# Patient Record
Sex: MALE | Race: WHITE | NOT HISPANIC OR LATINO | Employment: OTHER | URBAN - NONMETROPOLITAN AREA
[De-identification: names, ages, dates, MRNs, and addresses within clinical notes are randomized per-mention and may not be internally consistent; named-entity substitution may affect disease eponyms.]

---

## 2017-01-30 ENCOUNTER — TRANSCRIBE ORDERS (OUTPATIENT)
Dept: ADMINISTRATIVE | Facility: HOSPITAL | Age: 62
End: 2017-01-30

## 2017-01-30 ENCOUNTER — HOSPITAL ENCOUNTER (OUTPATIENT)
Dept: GENERAL RADIOLOGY | Facility: HOSPITAL | Age: 62
Discharge: HOME | End: 2017-01-30
Admitting: NURSE PRACTITIONER

## 2017-01-30 DIAGNOSIS — R05.9 COUGH: Primary | ICD-10-CM

## 2017-01-31 ENCOUNTER — ANESTHESIA EVENT (OUTPATIENT)
Dept: GASTROENTEROLOGY | Facility: HOSPITAL | Age: 62
End: 2017-01-31

## 2017-01-31 ENCOUNTER — ANESTHESIA (OUTPATIENT)
Dept: GASTROENTEROLOGY | Facility: HOSPITAL | Age: 62
End: 2017-01-31

## 2017-01-31 PROCEDURE — 25010000002 PROPOFOL 10 MG/ML EMULSION: Performed by: NURSE ANESTHETIST, CERTIFIED REGISTERED

## 2017-01-31 RX ORDER — LIDOCAINE HYDROCHLORIDE 20 MG/ML
INJECTION, SOLUTION INFILTRATION; PERINEURAL AS NEEDED
Status: DISCONTINUED | OUTPATIENT
Start: 2017-01-31 | End: 2017-01-31 | Stop reason: SURG

## 2017-01-31 RX ORDER — PROPOFOL 10 MG/ML
VIAL (ML) INTRAVENOUS AS NEEDED
Status: DISCONTINUED | OUTPATIENT
Start: 2017-01-31 | End: 2017-01-31 | Stop reason: SURG

## 2017-01-31 RX ADMIN — LIDOCAINE HYDROCHLORIDE 50 MG: 20 INJECTION, SOLUTION INFILTRATION; PERINEURAL at 13:04

## 2017-01-31 RX ADMIN — PROPOFOL 450 MG: 10 INJECTION, EMULSION INTRAVENOUS at 13:04

## 2017-01-31 NOTE — ANESTHESIA POSTPROCEDURE EVALUATION
Patient: Erick Luong    Procedure Summary     Date Anesthesia Start Anesthesia Stop Room / Location    01/31/17 1301 1323  [facility] /  [facility]       Procedure Diagnosis Surgeon Provider    COLONOSCOPY WITH ANESTHESIA (N/A ) Abdominal pain, unspecified location  (Abdominal pain, unspecified location [R10.9]) MD Lamine Davila, YAIR          Anesthesia Type: general  Last vitals  BP      Temp      Pulse     Resp      SpO2        Post Anesthesia Care and Evaluation    Patient location during evaluation: PHASE II  Patient participation: complete - patient participated  Level of consciousness: awake  Pain management: adequate  Airway patency: patent  Anesthetic complications: No anesthetic complications  Respiratory status: acceptable  Hydration status: acceptable

## 2017-01-31 NOTE — ANESTHESIA PREPROCEDURE EVALUATION
Anesthesia Evaluation     Patient summary reviewed    No history of anesthetic complications   Airway   Mallampati: III  TM distance: >3 FB  Neck ROM: limited  Dental      Pulmonary    (+) sleep apnea,   (-) COPD, asthma, not a smoker  Cardiovascular   (+) hypertension, CAD, CABG (10/2015),   (-) pacemaker, angina, cardiac stents    Patient on routine beta blocker and Beta blocker given within 24 hours of surgery    Neuro/Psych  (-) seizures, TIA, CVA  GI/Hepatic/Renal/Endo    (+) morbid obesity, chronic renal disease CRI, diabetes mellitus using insulin,   (-) GERD, liver disease    Musculoskeletal     Abdominal    Substance History      OB/GYN          Other                             Anesthesia Plan    ASA 3     general     intravenous induction   Anesthetic plan and risks discussed with patient.

## 2017-02-01 ENCOUNTER — HOSPITAL ENCOUNTER (EMERGENCY)
Facility: HOSPITAL | Age: 62
Discharge: HOME | End: 2017-02-01
Attending: EMERGENCY MEDICINE | Admitting: EMERGENCY MEDICINE

## 2017-02-01 ENCOUNTER — APPOINTMENT (OUTPATIENT)
Dept: GENERAL RADIOLOGY | Facility: HOSPITAL | Age: 62
End: 2017-02-01

## 2017-02-01 VITALS
WEIGHT: 315 LBS | TEMPERATURE: 98.3 F | DIASTOLIC BLOOD PRESSURE: 61 MMHG | HEIGHT: 72 IN | BODY MASS INDEX: 42.66 KG/M2 | RESPIRATION RATE: 19 BRPM | HEART RATE: 73 BPM | SYSTOLIC BLOOD PRESSURE: 136 MMHG | OXYGEN SATURATION: 97 %

## 2017-02-01 DIAGNOSIS — Z77.098 EXPOSURE TO CHEMICAL INHALATION: Primary | ICD-10-CM

## 2017-02-01 PROCEDURE — 99284 EMERGENCY DEPT VISIT MOD MDM: CPT

## 2017-02-01 PROCEDURE — 71010 HC CHEST PA OR AP: CPT

## 2017-02-01 PROCEDURE — 96374 THER/PROPH/DIAG INJ IV PUSH: CPT

## 2017-02-01 PROCEDURE — 93005 ELECTROCARDIOGRAM TRACING: CPT | Performed by: EMERGENCY MEDICINE

## 2017-02-01 PROCEDURE — 25010000002 METHYLPREDNISOLONE PER 125 MG: Performed by: EMERGENCY MEDICINE

## 2017-02-01 PROCEDURE — 94640 AIRWAY INHALATION TREATMENT: CPT

## 2017-02-01 PROCEDURE — 93010 ELECTROCARDIOGRAM REPORT: CPT | Performed by: INTERNAL MEDICINE

## 2017-02-01 RX ORDER — SODIUM CHLORIDE 0.9 % (FLUSH) 0.9 %
10 SYRINGE (ML) INJECTION AS NEEDED
Status: DISCONTINUED | OUTPATIENT
Start: 2017-02-01 | End: 2017-02-01 | Stop reason: HOSPADM

## 2017-02-01 RX ORDER — IPRATROPIUM BROMIDE AND ALBUTEROL SULFATE 2.5; .5 MG/3ML; MG/3ML
3 SOLUTION RESPIRATORY (INHALATION) ONCE
Status: COMPLETED | OUTPATIENT
Start: 2017-02-01 | End: 2017-02-01

## 2017-02-01 RX ORDER — METHYLPREDNISOLONE 4 MG/1
TABLET ORAL
Qty: 21 TABLET | Refills: 0 | Status: SHIPPED | OUTPATIENT
Start: 2017-02-01 | End: 2017-03-30 | Stop reason: HOSPADM

## 2017-02-01 RX ORDER — METHYLPREDNISOLONE SODIUM SUCCINATE 125 MG/2ML
125 INJECTION, POWDER, LYOPHILIZED, FOR SOLUTION INTRAMUSCULAR; INTRAVENOUS ONCE
Status: COMPLETED | OUTPATIENT
Start: 2017-02-01 | End: 2017-02-01

## 2017-02-01 RX ADMIN — METHYLPREDNISOLONE SODIUM SUCCINATE 125 MG: 125 INJECTION, POWDER, FOR SOLUTION INTRAMUSCULAR; INTRAVENOUS at 14:33

## 2017-02-01 RX ADMIN — IPRATROPIUM BROMIDE AND ALBUTEROL SULFATE 3 ML: .5; 3 SOLUTION RESPIRATORY (INHALATION) at 14:01

## 2017-02-02 ENCOUNTER — TELEPHONE (OUTPATIENT)
Dept: GASTROENTEROLOGY | Facility: CLINIC | Age: 62
End: 2017-02-02

## 2017-02-02 NOTE — ED PROVIDER NOTES
"Subjective   HPI Comments: Patient used bleach and \"liquid fire\" to clean toilet bowl and got some of fumes in lungs.  Since then having cough and SOB.    Patient is a 61 y.o. male presenting with shortness of breath.   History provided by:  Patient   used: No    Shortness of Breath   Severity:  Severe  Onset quality:  Sudden  Duration:  4 hours  Timing:  Constant  Progression:  Unchanged  Chronicity:  New  Context: fumes    Context: not activity, not animal exposure and not emotional upset    Relieved by:  Nothing  Worsened by:  Nothing  Ineffective treatments:  None tried  Associated symptoms: cough    Risk factors: no recent alcohol use, no family hx of DVT and no hx of cancer        Review of Systems   Constitutional: Negative.    HENT: Negative.    Respiratory: Positive for cough and shortness of breath.    Cardiovascular: Negative.    Gastrointestinal: Negative.    Genitourinary: Negative.    Musculoskeletal: Negative.    Neurological: Negative.    All other systems reviewed and are negative.      Past Medical History   Diagnosis Date   • Arthritis    • CHF (congestive heart failure)    • Coronary artery disease    • Diabetes mellitus    • Hypertension    • Sleep apnea with use of continuous positive airway pressure (CPAP)        Allergies   Allergen Reactions   • Bactrim [Sulfamethoxazole-Trimethoprim]        Past Surgical History   Procedure Laterality Date   • Appendectomy     • Abdominal surgery     • Cataract extraction     • Cardiac surgery     • Incision and drainage of wound Left 09/2007     spider bite   • Colonoscopy w/ polypectomy  03/04/2014     Hyperplastic polyp   • Endoscopy  04/13/2011     Gastritis with hemorrhage   • Cervical spine surgery     • Colonoscopy N/A 1/31/2017     Procedure: COLONOSCOPY WITH ANESTHESIA;  Surgeon: Cristopher Hannah MD;  Location: [facility] ENDOSCOPY;  Service:        Family History   Problem Relation Age of Onset   • Colon cancer Father    • Colon " cancer Sister        Social History     Social History   • Marital status:      Spouse name: N/A   • Number of children: N/A   • Years of education: N/A     Social History Main Topics   • Smoking status: Former Smoker   • Smokeless tobacco: None      Comment: smoked in highschool   • Alcohol use No   • Drug use: No   • Sexual activity: Defer     Other Topics Concern   • None     Social History Narrative   • None       Prior to Admission medications    Medication Sig Start Date End Date Taking? Authorizing Provider   allopurinol (ZYLOPRIM) 100 MG tablet Take 100 mg by mouth Daily.    Historical Provider, MD   aspirin 81 MG EC tablet Take 81 mg by mouth Daily.    Historical Provider, MD   bumetanide (BUMEX) 2 MG tablet TAKE 1 TABLET EVERY DAY 9/11/16   Historical Provider, MD   BYSTOLIC 5 MG tablet TAKE 1 TABLET BY MOUTH DAILY 9/12/16   Historical Provider, MD   CRESTOR 40 MG tablet TAKE 1 TABLET EVERY DAY 8/28/16   Historical Provider, MD   escitalopram (LEXAPRO) 10 MG tablet TAKE 1 TABLET DAILY 8/28/16   Historical Provider, MD   insulin regular (humuLIN R) 500 UNIT/ML CONCENTRATED injection Inject 100 Units under the skin 3 (Three) Times a Day Before Meals. Packaging states 100 units with regular meals; 120 units with large meals or 360 units daily    Historical Provider, MD   irbesartan (AVAPRO) 300 MG tablet TAKE 1 TABLET EVERY DAY 9/11/16   Historical Provider, MD   LINZESS 290 MCG capsule capsule TAKE 1 CAPSULE EVERY DAY 7/11/16   Historical Provider, MD   MethylPREDNISolone (MEDROL, JOSE,) 4 MG tablet Take as directed on package instructions. 2/1/17   Karel Arriaza Jr., MD   metOLazone (ZAROXOLYN) 2.5 MG tablet Take 2.5 mg by mouth Daily.    Historical Provider, MD   ondansetron (ZOFRAN) 4 MG tablet TAKE 1 TABLET BY MOUTH EVERY 4 HOURS AS NEEDED FOR NAUSEA AND VOMITING 9/5/16   Historical Provider, MD   oxyCODONE-acetaminophen (PERCOCET)  MG per tablet TAKE 1 TABLET BY MOUTH TWICE A DAY AS  NEEDED 8/16/16   Historical Provider, MD   traZODone (DESYREL) 50 MG tablet TAKE 1 TABLET BY MOUTH AT BEDTIME 8/29/16   Historical Provider, MD       Medications   sodium chloride 0.9 % flush 10 mL (not administered)   methylPREDNISolone sodium succinate (SOLU-Medrol) injection 125 mg (125 mg Intravenous Given 2/1/17 1433)   ipratropium-albuterol (DUO-NEB) nebulizer solution 3 mL (3 mL Nebulization Given 2/1/17 1401)       Vitals:    02/01/17 1649   BP: 136/61   Pulse: 73   Resp: 19   Temp: 98.3 °F (36.8 °C)   SpO2: 97%         Objective   Physical Exam   Constitutional: He is oriented to person, place, and time. He appears well-developed and well-nourished.   HENT:   Head: Normocephalic and atraumatic.   Mouth/Throat: Oropharynx is clear and moist.   Neck: Normal range of motion. Neck supple.   Cardiovascular: Normal rate and regular rhythm.    Pulmonary/Chest:   Mild tachypnea with scattered wheezes.   Abdominal: Soft. Bowel sounds are normal.   Musculoskeletal: Normal range of motion.   Neurological: He is alert and oriented to person, place, and time.   Skin: Skin is warm and dry.   Psychiatric: He has a normal mood and affect. His behavior is normal.   Nursing note and vitals reviewed.      Procedures         Lab Results (last 24 hours)     ** No results found for the last 24 hours. **          XR Chest 1 View   Final Result   Impression: No acute findings. Stable exam.   This report was finalized on 02/01/2017 14:42 by Dr. Justen Figueroa MD.          ED Course  ED Course   Comment By Time   After treatment per recommendations of poison control for earlier similar patient, this patient feeling much better. Karel Arriaza Jr., MD 02/02 0504          MDM  Number of Diagnoses or Management Options  Exposure to chemical inhalation: new and requires workup     Amount and/or Complexity of Data Reviewed  Tests in the radiology section of CPT®: ordered and reviewed    Risk of Complications, Morbidity, and/or  Mortality  Presenting problems: moderate  Diagnostic procedures: moderate  Management options: moderate    Patient Progress  Patient progress: stable      Final diagnoses:   Exposure to chemical inhalation        Karel Arriaza Jr., MD  02/02/17 4049

## 2017-02-06 PROBLEM — I50.9 CHF (CONGESTIVE HEART FAILURE) (HCC): Status: ACTIVE | Noted: 2017-02-06

## 2017-02-06 PROBLEM — E11.9 DIABETES MELLITUS: Status: ACTIVE | Noted: 2017-02-06

## 2017-02-06 PROBLEM — I10 HYPERTENSION: Status: ACTIVE | Noted: 2017-02-06

## 2017-02-06 PROBLEM — M19.90 ARTHRITIS: Status: ACTIVE | Noted: 2017-02-06

## 2017-02-06 PROBLEM — G47.30 SLEEP APNEA: Status: ACTIVE | Noted: 2017-02-06

## 2017-02-06 PROBLEM — I25.10 CAD (CORONARY ARTERY DISEASE): Status: ACTIVE | Noted: 2017-02-06

## 2017-02-07 ENCOUNTER — OFFICE VISIT (OUTPATIENT)
Dept: CARDIOLOGY | Facility: CLINIC | Age: 62
End: 2017-02-07

## 2017-02-07 VITALS
DIASTOLIC BLOOD PRESSURE: 64 MMHG | HEART RATE: 69 BPM | HEIGHT: 72 IN | BODY MASS INDEX: 42.66 KG/M2 | SYSTOLIC BLOOD PRESSURE: 124 MMHG | WEIGHT: 315 LBS

## 2017-02-07 DIAGNOSIS — I25.10 CORONARY ARTERY DISEASE INVOLVING NATIVE CORONARY ARTERY OF NATIVE HEART WITHOUT ANGINA PECTORIS: Primary | ICD-10-CM

## 2017-02-07 DIAGNOSIS — E78.2 MIXED HYPERLIPIDEMIA: ICD-10-CM

## 2017-02-07 DIAGNOSIS — R06.02 SHORTNESS OF BREATH: ICD-10-CM

## 2017-02-07 DIAGNOSIS — I10 ESSENTIAL HYPERTENSION: ICD-10-CM

## 2017-02-07 PROCEDURE — 99213 OFFICE O/P EST LOW 20 MIN: CPT | Performed by: INTERNAL MEDICINE

## 2017-02-07 PROCEDURE — 93000 ELECTROCARDIOGRAM COMPLETE: CPT | Performed by: INTERNAL MEDICINE

## 2017-02-07 NOTE — PROGRESS NOTES
Subjective:     Encounter Date:02/07/2017      Patient ID: Erick Luong is a 61 y.o. male.  With coronary artery disease status post coronary artery bypass grafting, hypertension, hyperlipidemia, chronic shortness of breath, intermittent lower extremity edema, type II diabetes mellitus, morbid obesity here for routine follow-up    Chief Complaint: Routine follow-up    Coronary Artery Disease   Presents for follow-up visit. Symptoms include leg swelling and shortness of breath. Pertinent negatives include no chest pain, chest pressure, chest tightness, dizziness, muscle weakness, palpitations or weight gain. The symptoms have been stable. Compliance with diet is variable. Compliance with exercise is variable. Compliance with medications is good.   Hypertension   This is a chronic problem. The problem is unchanged. The problem is controlled. Associated symptoms include malaise/fatigue, peripheral edema and shortness of breath. Pertinent negatives include no chest pain, headaches, orthopnea, palpitations or PND. There are no associated agents to hypertension. Risk factors for coronary artery disease include obesity, male gender, dyslipidemia and diabetes mellitus. Past treatments include diuretics, angiotensin blockers and beta blockers. The current treatment provides significant improvement. Compliance problems include exercise and diet.  Hypertensive end-organ damage includes CAD/MI. There is no history of angina.   Shortness of Breath   This is a chronic problem. The problem occurs daily. The problem has been unchanged. Associated symptoms include leg swelling. Pertinent negatives include no abdominal pain, chest pain, claudication, fever, headaches, hemoptysis, orthopnea, PND, sore throat, syncope, vomiting or wheezing. The symptoms are aggravated by exercise. He has tried rest for the symptoms. The treatment provided moderate relief. His past medical history is significant for CAD. There is no history of PE.      The patient notes that he has been doing reasonably well from a cardiac standpoint.  He has not been having any significant chest pain.  He continues to have mild shortness of breath and dyspnea on exertion.  He has intermittent lower extremity edema but this is managed fairly well with diuretics.  His weight has been relatively stable other than some occasional up 7 pounds.  Recently, he is not to the emergency department after inhaling chemical fumes.  Apparently this was unintentional.  Since that time, he has had no significant difficulties.  He has had no trouble with his medications.  He denies orthopnea, PND.    The following portions of the patient's history were reviewed and updated as appropriate: allergies, current medications, past family history, past medical history, past social history, past surgical history and problem list.       Current Outpatient Prescriptions:   •  allopurinol (ZYLOPRIM) 100 MG tablet, Take 100 mg by mouth Daily., Disp: , Rfl:   •  aspirin 81 MG EC tablet, Take 81 mg by mouth Daily., Disp: , Rfl:   •  bumetanide (BUMEX) 2 MG tablet, TAKE 1 TABLET EVERY DAY, Disp: , Rfl: 4  •  BYSTOLIC 5 MG tablet, TAKE 1 TABLET BY MOUTH DAILY, Disp: , Rfl: 3  •  CRESTOR 40 MG tablet, TAKE 1 TABLET EVERY DAY, Disp: , Rfl: 6  •  escitalopram (LEXAPRO) 10 MG tablet, TAKE 1 TABLET DAILY, Disp: , Rfl: 5  •  insulin regular (humuLIN R) 500 UNIT/ML CONCENTRATED injection, Inject 100 Units under the skin 3 (Three) Times a Day Before Meals. Packaging states 100 units with regular meals; 120 units with large meals or 360 units daily, Disp: , Rfl:   •  irbesartan (AVAPRO) 300 MG tablet, TAKE 1 TABLET EVERY DAY, Disp: , Rfl: 11  •  LINZESS 290 MCG capsule capsule, TAKE 1 CAPSULE EVERY DAY, Disp: , Rfl: 3  •  MethylPREDNISolone (MEDROL, JOSE,) 4 MG tablet, Take as directed on package instructions., Disp: 21 tablet, Rfl: 0  •  metOLazone (ZAROXOLYN) 2.5 MG tablet, Take 2.5 mg by mouth Daily., Disp: , Rfl:    •  ondansetron (ZOFRAN) 4 MG tablet, TAKE 1 TABLET BY MOUTH EVERY 4 HOURS AS NEEDED FOR NAUSEA AND VOMITING, Disp: , Rfl: 0  •  oxyCODONE-acetaminophen (PERCOCET)  MG per tablet, TAKE 1 TABLET BY MOUTH TWICE A DAY AS NEEDED, Disp: , Rfl: 0  •  traZODone (DESYREL) 50 MG tablet, TAKE 1 TABLET BY MOUTH AT BEDTIME, Disp: , Rfl: 3    Review of Systems   Constitution: Positive for malaise/fatigue. Negative for chills, fever, weakness, night sweats, weight gain and weight loss.   HENT: Negative for congestion, headaches and sore throat.    Cardiovascular: Positive for dyspnea on exertion and leg swelling. Negative for chest pain, claudication, irregular heartbeat, orthopnea, palpitations, paroxysmal nocturnal dyspnea and syncope.   Respiratory: Positive for shortness of breath. Negative for chest tightness, cough, hemoptysis and wheezing.    Endocrine: Negative for cold intolerance, heat intolerance, polydipsia and polyuria.   Hematologic/Lymphatic: Negative for bleeding problem. Does not bruise/bleed easily.   Musculoskeletal: Negative for muscle weakness.   Gastrointestinal: Negative for abdominal pain, hematemesis, hematochezia, melena, nausea and vomiting.   Genitourinary: Negative for bladder incontinence, dysuria and hematuria.   Neurological: Negative for dizziness, loss of balance, numbness, paresthesias and seizures.       ECG 12 Lead  Date/Time: 2/7/2017 5:10 PM  Performed by: RIVERA GANT  Authorized by: RIVERA GANT   Comparison: compared with previous ECG   Similar to previous ECG  Rhythm: sinus rhythm  Rate: normal  Conduction: incomplete RBBB, LAFB and 1st degree  Clinical impression: abnormal ECG             Objective:     Physical Exam   Constitutional: He is oriented to person, place, and time. He appears well-developed and well-nourished. No distress.   HENT:   Head: Normocephalic and atraumatic.   Mouth/Throat: Oropharynx is clear and moist.   Eyes: EOM are normal. Pupils are  "equal, round, and reactive to light.   Neck: Normal range of motion. Neck supple. No JVD present. No thyromegaly present.   Cardiovascular: Normal rate, regular rhythm, S1 normal, S2 normal, normal heart sounds and intact distal pulses.  Exam reveals no gallop and no friction rub.    No murmur heard.  Pulmonary/Chest: Effort normal and breath sounds normal.   Abdominal: Soft. Bowel sounds are normal. He exhibits no distension. There is no tenderness.   Musculoskeletal: Normal range of motion. He exhibits no edema.   Neurological: He is alert and oriented to person, place, and time. No cranial nerve deficit.   Skin: Skin is warm and dry. No rash noted. No cyanosis or erythema. Nails show no clubbing.   Psychiatric: He has a normal mood and affect.   Vitals reviewed.    Visit Vitals   • /64 (BP Location: Left arm, Patient Position: Sitting)   • Pulse 69   • Ht 72\" (182.9 cm)   • Wt (!) 344 lb (156 kg)   • BMI 46.65 kg/m2     Data Review:    Cardiac cath 10/1/2015  SELECTIVE CORONARY ANGIOGRAPHY:     LEFT MAIN CORONARY ARTERY: The left main coronary artery arises normally from  the left coronary cusp and bifurcates into an LAD and left circumflex arteries  and has luminal irregularities, but no significant disease.     LEFT ANTERIOR DESCENDING: The left anterior descending artery arises from the  distal left main and courses in the anterior interventricular groove supplying  1 significant diagonal branch, multiple septal perforators and terminates by  wrapping the apex. The proximal portion of the LAD is calcified and in the  proximal and mid portions has tandem 80% stenotic lesions. The first diagonal  branch has an ostial 70% stenosis. The remainder of the vessel has diffuse  nonobstructive disease.      LEFT CIRCUMFLEX: The left circumflex arises from the distal left main and  supplies 2 obtuse marginal branches which have luminal irregularities,  predominantly of 20-30%. The ostium of the second obtuse " marginal appears in  some views to have a 50-60% stenotic lesion.     RIGHT CORONARY ARTERY: The right coronary artery arises normally from the   right coronary cusp and is dominant for the posterior circulation supplying a   posterior descending artery and a posterolateral system. The proximal portion   of the right coronary artery has an 80% stenosis. The remainder of the vessel   is diffusely diseased with mid vessel having approximately 40% stenosis. The  posterolateral branch has no significant disease. The posterior descending  artery has 30-40% proximal stenosis.    CABG 10/2/2015:  SURGICAL PROCEDURE: Coronary artery bypass grafting x4 (left internal mammary  artery graft to left anterior descending coronary artery; saphenous vein graft  to diagonal coronary artery; saphenous vein graft to obtuse marginal coronary  artery; saphenous vein graft to right coronary artery) and endoscopic vein  harvesting.       Assessment:          Diagnosis Plan   1. Coronary artery disease involving native coronary artery of native heart without angina pectoris  ECG 12 Lead   2. Essential hypertension     3. Mixed hyperlipidemia     4. Shortness of breath          Plan:       1.  Coronary artery disease: The patient is stable status post coronary artery bypass grafting.  Continue current medications, including aspirin therapy.  The patient has no ischemic symptoms at this time.  We will continue to follow her yearly unless needed sooner.    2.  Essential hypertension: The patient's blood pressures under good control on his current medications.  Continue these without change.    3.  Mixed hyperlipidemia: The patient remains on statin therapy.  No changes made at this time.    4.  Shortness of breath: This is likely multifactorial.  The patient is morbidly obese and I have encouraged diet, exercise, weight loss.    Follow-up: 12 months unless needed sooner.    EMR Dragon/Transcription disclaimer: Much of this encounter note is  an electronic transcription/translation of spoken language to printed text. The electronic translation of spoken language may permit erroneous, or at times, nonsensical words or phrases to be inadvertently transcribed; although I have reviewed the note for such errors, some may still exist.

## 2017-03-26 ENCOUNTER — HOSPITAL ENCOUNTER (INPATIENT)
Facility: HOSPITAL | Age: 62
LOS: 3 days | Discharge: HOME | End: 2017-03-30
Attending: FAMILY MEDICINE | Admitting: INTERNAL MEDICINE

## 2017-03-26 DIAGNOSIS — R10.84 GENERALIZED ABDOMINAL PAIN: ICD-10-CM

## 2017-03-26 DIAGNOSIS — M79.10 MYALGIA: ICD-10-CM

## 2017-03-26 DIAGNOSIS — K85.90 ACUTE PANCREATITIS, UNSPECIFIED COMPLICATION STATUS, UNSPECIFIED PANCREATITIS TYPE: Primary | ICD-10-CM

## 2017-03-26 PROCEDURE — 99284 EMERGENCY DEPT VISIT MOD MDM: CPT

## 2017-03-27 ENCOUNTER — APPOINTMENT (OUTPATIENT)
Dept: CT IMAGING | Facility: HOSPITAL | Age: 62
End: 2017-03-27

## 2017-03-27 ENCOUNTER — APPOINTMENT (OUTPATIENT)
Dept: CARDIOLOGY | Facility: HOSPITAL | Age: 62
End: 2017-03-27

## 2017-03-27 ENCOUNTER — APPOINTMENT (OUTPATIENT)
Dept: GENERAL RADIOLOGY | Facility: HOSPITAL | Age: 62
End: 2017-03-27

## 2017-03-27 PROBLEM — K85.90 ACUTE PANCREATITIS: Status: ACTIVE | Noted: 2017-03-27

## 2017-03-27 LAB
ALBUMIN SERPL-MCNC: 4.1 G/DL (ref 3.5–5)
ALBUMIN/GLOB SERPL: 1.5 G/DL (ref 1.1–2.5)
ALP SERPL-CCNC: 56 U/L (ref 24–120)
ALT SERPL W P-5'-P-CCNC: 26 U/L (ref 0–54)
AMPHET+METHAMPHET UR QL: NEGATIVE
AMYLASE SERPL-CCNC: 87 U/L (ref 30–110)
ANION GAP SERPL CALCULATED.3IONS-SCNC: 10 MMOL/L (ref 4–13)
ANION GAP SERPL CALCULATED.3IONS-SCNC: 10 MMOL/L (ref 4–13)
APTT PPP: 26.7 SECONDS (ref 24.1–34.8)
AST SERPL-CCNC: 38 U/L (ref 7–45)
BARBITURATES UR QL SCN: NEGATIVE
BASOPHILS # BLD AUTO: 0.06 10*3/MM3 (ref 0–0.2)
BASOPHILS NFR BLD AUTO: 0.6 % (ref 0–2)
BENZODIAZ UR QL SCN: NEGATIVE
BILIRUB SERPL-MCNC: 0.4 MG/DL (ref 0.1–1)
BILIRUB UR QL STRIP: NEGATIVE
BUN BLD-MCNC: 45 MG/DL (ref 5–21)
BUN BLD-MCNC: 49 MG/DL (ref 5–21)
BUN/CREAT SERPL: 19.8 (ref 7–25)
BUN/CREAT SERPL: 20.1 (ref 7–25)
CALCIUM SPEC-SCNC: 8.7 MG/DL (ref 8.4–10.4)
CALCIUM SPEC-SCNC: 9.1 MG/DL (ref 8.4–10.4)
CANNABINOIDS SERPL QL: NEGATIVE
CHLORIDE SERPL-SCNC: 100 MMOL/L (ref 98–110)
CHLORIDE SERPL-SCNC: 97 MMOL/L (ref 98–110)
CK MB SERPL-CCNC: 3.32 NG/ML (ref 0–5)
CK SERPL-CCNC: 297 U/L (ref 0–203)
CLARITY UR: CLEAR
CO2 SERPL-SCNC: 28 MMOL/L (ref 24–31)
CO2 SERPL-SCNC: 29 MMOL/L (ref 24–31)
COCAINE UR QL: NEGATIVE
COLOR UR: YELLOW
CREAT BLD-MCNC: 2.24 MG/DL (ref 0.5–1.4)
CREAT BLD-MCNC: 2.47 MG/DL (ref 0.5–1.4)
D DIMER PPP FEU-MCNC: 0.31 MG/L (FEU) (ref 0–0.5)
DEPRECATED RDW RBC AUTO: 44.3 FL (ref 40–54)
EOSINOPHIL # BLD AUTO: 0.66 10*3/MM3 (ref 0–0.7)
EOSINOPHIL NFR BLD AUTO: 6.3 % (ref 0–4)
ERYTHROCYTE [DISTWIDTH] IN BLOOD BY AUTOMATED COUNT: 13.7 % (ref 12–15)
FLUAV AG NPH QL: NEGATIVE
FLUBV AG NPH QL IA: NEGATIVE
GFR SERPL CREATININE-BSD FRML MDRD: 27 ML/MIN/1.73
GFR SERPL CREATININE-BSD FRML MDRD: 30 ML/MIN/1.73
GLOBULIN UR ELPH-MCNC: 2.8 GM/DL
GLUCOSE BLD-MCNC: 132 MG/DL (ref 70–100)
GLUCOSE BLD-MCNC: 233 MG/DL (ref 70–100)
GLUCOSE BLDC GLUCOMTR-MCNC: 111 MG/DL (ref 70–130)
GLUCOSE BLDC GLUCOMTR-MCNC: 178 MG/DL (ref 70–130)
GLUCOSE BLDC GLUCOMTR-MCNC: 215 MG/DL (ref 70–130)
GLUCOSE BLDC GLUCOMTR-MCNC: 235 MG/DL (ref 70–130)
GLUCOSE UR STRIP-MCNC: ABNORMAL MG/DL
HBA1C MFR BLD: 9.7 %
HCT VFR BLD AUTO: 32.4 % (ref 40–52)
HGB BLD-MCNC: 10.9 G/DL (ref 14–18)
HGB UR QL STRIP.AUTO: NEGATIVE
HOLD SPECIMEN: NORMAL
HOLD SPECIMEN: NORMAL
IMM GRANULOCYTES # BLD: 0.04 10*3/MM3 (ref 0–0.03)
IMM GRANULOCYTES NFR BLD: 0.4 % (ref 0–5)
INR PPP: 0.85 (ref 0.91–1.09)
KETONES UR QL STRIP: NEGATIVE
LEUKOCYTE ESTERASE UR QL STRIP.AUTO: NEGATIVE
LIPASE SERPL-CCNC: 603 U/L (ref 23–203)
LYMPHOCYTES # BLD AUTO: 2.18 10*3/MM3 (ref 0.72–4.86)
LYMPHOCYTES NFR BLD AUTO: 21 % (ref 15–45)
MAGNESIUM SERPL-MCNC: 2.3 MG/DL (ref 1.4–2.2)
MCH RBC QN AUTO: 29.9 PG (ref 28–32)
MCHC RBC AUTO-ENTMCNC: 33.6 G/DL (ref 33–36)
MCV RBC AUTO: 88.8 FL (ref 82–95)
METHADONE UR QL SCN: NEGATIVE
MONOCYTES # BLD AUTO: 1 10*3/MM3 (ref 0.19–1.3)
MONOCYTES NFR BLD AUTO: 9.6 % (ref 4–12)
NEUTROPHILS # BLD AUTO: 6.46 10*3/MM3 (ref 1.87–8.4)
NEUTROPHILS NFR BLD AUTO: 62.1 % (ref 39–78)
NITRITE UR QL STRIP: NEGATIVE
NT-PROBNP SERPL-MCNC: 1290 PG/ML (ref 0–900)
OPIATES UR QL: NEGATIVE
OSMOLALITY UR: 501 MOSM/KG (ref 601–850)
PCP UR QL SCN: NEGATIVE
PH UR STRIP.AUTO: 5.5 [PH] (ref 5–8)
PLATELET # BLD AUTO: 232 10*3/MM3 (ref 130–400)
PMV BLD AUTO: 11.1 FL (ref 6–12)
POTASSIUM BLD-SCNC: 4.1 MMOL/L (ref 3.5–5.3)
POTASSIUM BLD-SCNC: 4.1 MMOL/L (ref 3.5–5.3)
PROT SERPL-MCNC: 6.9 G/DL (ref 6.3–8.7)
PROT UR QL STRIP: NEGATIVE
PROTHROMBIN TIME: 11.9 SECONDS (ref 11.9–14.6)
RBC # BLD AUTO: 3.65 10*6/MM3 (ref 4.8–5.9)
SODIUM BLD-SCNC: 136 MMOL/L (ref 135–145)
SODIUM BLD-SCNC: 138 MMOL/L (ref 135–145)
SODIUM UR-SCNC: 78 MMOL/L (ref 30–90)
SP GR UR STRIP: 1.01 (ref 1–1.03)
TROPONIN I SERPL-MCNC: 0 NG/ML (ref 0–0.07)
TROPONIN I SERPL-MCNC: <0.012 NG/ML (ref 0–0.03)
URATE SERPL-MCNC: 9 MG/DL (ref 3.5–8.5)
UROBILINOGEN UR QL STRIP: ABNORMAL
WBC NRBC COR # BLD: 10.4 10*3/MM3 (ref 4.8–10.8)
WHOLE BLOOD HOLD SPECIMEN: NORMAL
WHOLE BLOOD HOLD SPECIMEN: NORMAL

## 2017-03-27 PROCEDURE — 80307 DRUG TEST PRSMV CHEM ANLYZR: CPT | Performed by: FAMILY MEDICINE

## 2017-03-27 PROCEDURE — 84484 ASSAY OF TROPONIN QUANT: CPT | Performed by: INTERNAL MEDICINE

## 2017-03-27 PROCEDURE — 84300 ASSAY OF URINE SODIUM: CPT | Performed by: INTERNAL MEDICINE

## 2017-03-27 PROCEDURE — 25010000002 ONDANSETRON PER 1 MG: Performed by: FAMILY MEDICINE

## 2017-03-27 PROCEDURE — 80048 BASIC METABOLIC PNL TOTAL CA: CPT | Performed by: NURSE PRACTITIONER

## 2017-03-27 PROCEDURE — 93005 ELECTROCARDIOGRAM TRACING: CPT | Performed by: FAMILY MEDICINE

## 2017-03-27 PROCEDURE — 93010 ELECTROCARDIOGRAM REPORT: CPT | Performed by: INTERNAL MEDICINE

## 2017-03-27 PROCEDURE — 82962 GLUCOSE BLOOD TEST: CPT

## 2017-03-27 PROCEDURE — 84484 ASSAY OF TROPONIN QUANT: CPT

## 2017-03-27 PROCEDURE — 85379 FIBRIN DEGRADATION QUANT: CPT | Performed by: FAMILY MEDICINE

## 2017-03-27 PROCEDURE — 83735 ASSAY OF MAGNESIUM: CPT | Performed by: FAMILY MEDICINE

## 2017-03-27 PROCEDURE — 63710000001 INSULIN LISPRO (HUMAN) PER 5 UNITS: Performed by: INTERNAL MEDICINE

## 2017-03-27 PROCEDURE — 85610 PROTHROMBIN TIME: CPT | Performed by: FAMILY MEDICINE

## 2017-03-27 PROCEDURE — 74176 CT ABD & PELVIS W/O CONTRAST: CPT

## 2017-03-27 PROCEDURE — 82553 CREATINE MB FRACTION: CPT | Performed by: FAMILY MEDICINE

## 2017-03-27 PROCEDURE — 81003 URINALYSIS AUTO W/O SCOPE: CPT | Performed by: FAMILY MEDICINE

## 2017-03-27 PROCEDURE — 71010 HC CHEST PA OR AP: CPT

## 2017-03-27 PROCEDURE — 87804 INFLUENZA ASSAY W/OPTIC: CPT | Performed by: FAMILY MEDICINE

## 2017-03-27 PROCEDURE — 85025 COMPLETE CBC W/AUTO DIFF WBC: CPT | Performed by: FAMILY MEDICINE

## 2017-03-27 PROCEDURE — 25010000002 PERFLUTREN (DEFINITY) 8.476 MG IN SODIUM CHLORIDE 10 ML INJECTION: Performed by: INTERNAL MEDICINE

## 2017-03-27 PROCEDURE — 25010000002 ENOXAPARIN PER 10 MG: Performed by: INTERNAL MEDICINE

## 2017-03-27 PROCEDURE — 82550 ASSAY OF CK (CPK): CPT | Performed by: FAMILY MEDICINE

## 2017-03-27 PROCEDURE — 83036 HEMOGLOBIN GLYCOSYLATED A1C: CPT | Performed by: INTERNAL MEDICINE

## 2017-03-27 PROCEDURE — 25010000002 ONDANSETRON PER 1 MG: Performed by: INTERNAL MEDICINE

## 2017-03-27 PROCEDURE — 25010000002 HYDROMORPHONE PER 4 MG: Performed by: FAMILY MEDICINE

## 2017-03-27 PROCEDURE — 83880 ASSAY OF NATRIURETIC PEPTIDE: CPT | Performed by: FAMILY MEDICINE

## 2017-03-27 PROCEDURE — 83935 ASSAY OF URINE OSMOLALITY: CPT | Performed by: INTERNAL MEDICINE

## 2017-03-27 PROCEDURE — 82150 ASSAY OF AMYLASE: CPT | Performed by: FAMILY MEDICINE

## 2017-03-27 PROCEDURE — 85730 THROMBOPLASTIN TIME PARTIAL: CPT | Performed by: FAMILY MEDICINE

## 2017-03-27 PROCEDURE — 84550 ASSAY OF BLOOD/URIC ACID: CPT | Performed by: INTERNAL MEDICINE

## 2017-03-27 PROCEDURE — C8929 TTE W OR WO FOL WCON,DOPPLER: HCPCS

## 2017-03-27 PROCEDURE — 83690 ASSAY OF LIPASE: CPT | Performed by: FAMILY MEDICINE

## 2017-03-27 PROCEDURE — 93306 TTE W/DOPPLER COMPLETE: CPT | Performed by: INTERNAL MEDICINE

## 2017-03-27 PROCEDURE — 80053 COMPREHEN METABOLIC PANEL: CPT | Performed by: FAMILY MEDICINE

## 2017-03-27 PROCEDURE — 25010000002 HYDROMORPHONE PER 4 MG: Performed by: INTERNAL MEDICINE

## 2017-03-27 RX ORDER — SODIUM CHLORIDE 9 MG/ML
20 INJECTION, SOLUTION INTRAVENOUS CONTINUOUS
Status: DISCONTINUED | OUTPATIENT
Start: 2017-03-27 | End: 2017-03-29

## 2017-03-27 RX ORDER — ONDANSETRON 2 MG/ML
4 INJECTION INTRAMUSCULAR; INTRAVENOUS ONCE
Status: COMPLETED | OUTPATIENT
Start: 2017-03-27 | End: 2017-03-27

## 2017-03-27 RX ORDER — ASPIRIN 81 MG/1
81 TABLET ORAL DAILY
Status: DISCONTINUED | OUTPATIENT
Start: 2017-03-27 | End: 2017-03-30 | Stop reason: HOSPADM

## 2017-03-27 RX ORDER — ONDANSETRON 2 MG/ML
4 INJECTION INTRAMUSCULAR; INTRAVENOUS EVERY 6 HOURS PRN
Status: DISCONTINUED | OUTPATIENT
Start: 2017-03-27 | End: 2017-03-30 | Stop reason: HOSPADM

## 2017-03-27 RX ORDER — OXYCODONE HYDROCHLORIDE AND ACETAMINOPHEN 5; 325 MG/1; MG/1
2 TABLET ORAL ONCE
Status: COMPLETED | OUTPATIENT
Start: 2017-03-27 | End: 2017-03-27

## 2017-03-27 RX ORDER — NEBIVOLOL 5 MG/1
5 TABLET ORAL
Status: DISCONTINUED | OUTPATIENT
Start: 2017-03-27 | End: 2017-03-30 | Stop reason: HOSPADM

## 2017-03-27 RX ORDER — DEXTROSE MONOHYDRATE 25 G/50ML
25 INJECTION, SOLUTION INTRAVENOUS
Status: DISCONTINUED | OUTPATIENT
Start: 2017-03-27 | End: 2017-03-30 | Stop reason: HOSPADM

## 2017-03-27 RX ORDER — HYDRALAZINE HYDROCHLORIDE 20 MG/ML
10 INJECTION INTRAMUSCULAR; INTRAVENOUS EVERY 4 HOURS PRN
Status: DISCONTINUED | OUTPATIENT
Start: 2017-03-27 | End: 2017-03-30 | Stop reason: HOSPADM

## 2017-03-27 RX ORDER — NICOTINE POLACRILEX 4 MG
15 LOZENGE BUCCAL
Status: DISCONTINUED | OUTPATIENT
Start: 2017-03-27 | End: 2017-03-30 | Stop reason: HOSPADM

## 2017-03-27 RX ORDER — SODIUM CHLORIDE 450 MG/100ML
50 INJECTION, SOLUTION INTRAVENOUS CONTINUOUS
Status: DISCONTINUED | OUTPATIENT
Start: 2017-03-27 | End: 2017-03-27

## 2017-03-27 RX ORDER — PANTOPRAZOLE SODIUM 40 MG/10ML
40 INJECTION, POWDER, LYOPHILIZED, FOR SOLUTION INTRAVENOUS
Status: DISCONTINUED | OUTPATIENT
Start: 2017-03-27 | End: 2017-03-30 | Stop reason: HOSPADM

## 2017-03-27 RX ADMIN — HYDROMORPHONE HYDROCHLORIDE 0.5 MG: 1 INJECTION, SOLUTION INTRAMUSCULAR; INTRAVENOUS; SUBCUTANEOUS at 09:31

## 2017-03-27 RX ADMIN — INSULIN LISPRO 2 UNITS: 100 INJECTION, SOLUTION INTRAVENOUS; SUBCUTANEOUS at 11:59

## 2017-03-27 RX ADMIN — OXYCODONE HYDROCHLORIDE AND ACETAMINOPHEN 2 TABLET: 5; 325 TABLET ORAL at 00:56

## 2017-03-27 RX ADMIN — ONDANSETRON HYDROCHLORIDE 4 MG: 2 SOLUTION INTRAMUSCULAR; INTRAVENOUS at 21:52

## 2017-03-27 RX ADMIN — NEBIVOLOL HYDROCHLORIDE 5 MG: 5 TABLET ORAL at 21:55

## 2017-03-27 RX ADMIN — SODIUM CHLORIDE 5 ML: 9 INJECTION INTRAMUSCULAR; INTRAVENOUS; SUBCUTANEOUS at 15:13

## 2017-03-27 RX ADMIN — HYDROMORPHONE HYDROCHLORIDE 1 MG: 1 INJECTION, SOLUTION INTRAMUSCULAR; INTRAVENOUS; SUBCUTANEOUS at 02:50

## 2017-03-27 RX ADMIN — HYDROMORPHONE HYDROCHLORIDE 0.5 MG: 1 INJECTION, SOLUTION INTRAMUSCULAR; INTRAVENOUS; SUBCUTANEOUS at 23:48

## 2017-03-27 RX ADMIN — PANTOPRAZOLE SODIUM 40 MG: 40 INJECTION, POWDER, FOR SOLUTION INTRAVENOUS at 05:20

## 2017-03-27 RX ADMIN — HYDROMORPHONE HYDROCHLORIDE 0.5 MG: 1 INJECTION, SOLUTION INTRAMUSCULAR; INTRAVENOUS; SUBCUTANEOUS at 19:48

## 2017-03-27 RX ADMIN — ENOXAPARIN SODIUM 40 MG: 40 INJECTION SUBCUTANEOUS at 21:52

## 2017-03-27 RX ADMIN — ONDANSETRON 4 MG: 2 INJECTION INTRAMUSCULAR; INTRAVENOUS at 02:50

## 2017-03-27 RX ADMIN — ASPIRIN 81 MG: 81 TABLET ORAL at 21:54

## 2017-03-27 RX ADMIN — HYDROMORPHONE HYDROCHLORIDE 0.5 MG: 1 INJECTION, SOLUTION INTRAMUSCULAR; INTRAVENOUS; SUBCUTANEOUS at 05:11

## 2017-03-27 RX ADMIN — ENOXAPARIN SODIUM 40 MG: 40 INJECTION SUBCUTANEOUS at 05:20

## 2017-03-27 RX ADMIN — ONDANSETRON HYDROCHLORIDE 4 MG: 2 SOLUTION INTRAMUSCULAR; INTRAVENOUS at 05:11

## 2017-03-27 RX ADMIN — HYDROMORPHONE HYDROCHLORIDE 0.5 MG: 1 INJECTION, SOLUTION INTRAMUSCULAR; INTRAVENOUS; SUBCUTANEOUS at 15:18

## 2017-03-27 RX ADMIN — SODIUM CHLORIDE 75 ML/HR: 9 INJECTION, SOLUTION INTRAVENOUS at 15:11

## 2017-03-27 RX ADMIN — ONDANSETRON HYDROCHLORIDE 4 MG: 2 SOLUTION INTRAMUSCULAR; INTRAVENOUS at 15:18

## 2017-03-27 RX ADMIN — INSULIN LISPRO 3 UNITS: 100 INJECTION, SOLUTION INTRAVENOUS; SUBCUTANEOUS at 21:53

## 2017-03-27 RX ADMIN — ONDANSETRON 4 MG: 2 INJECTION INTRAMUSCULAR; INTRAVENOUS at 01:04

## 2017-03-27 RX ADMIN — SODIUM CHLORIDE 75 ML/HR: 4.5 INJECTION, SOLUTION INTRAVENOUS at 05:12

## 2017-03-28 LAB
ALBUMIN SERPL-MCNC: 3.5 G/DL (ref 3.5–5)
ALBUMIN/GLOB SERPL: 1.5 G/DL (ref 1.1–2.5)
ALP SERPL-CCNC: 51 U/L (ref 24–120)
ALT SERPL W P-5'-P-CCNC: 26 U/L (ref 0–54)
ANION GAP SERPL CALCULATED.3IONS-SCNC: 10 MMOL/L (ref 4–13)
AST SERPL-CCNC: 21 U/L (ref 7–45)
BH CV ECHO MEAS - AO MAX PG (FULL): 5.6 MMHG
BH CV ECHO MEAS - AO MAX PG: 9.4 MMHG
BH CV ECHO MEAS - AO MEAN PG (FULL): 4 MMHG
BH CV ECHO MEAS - AO MEAN PG: 6 MMHG
BH CV ECHO MEAS - AO ROOT AREA (BSA CORRECTED): 1.2
BH CV ECHO MEAS - AO ROOT AREA: 8.6 CM^2
BH CV ECHO MEAS - AO ROOT DIAM: 3.3 CM
BH CV ECHO MEAS - AO V2 MAX: 153 CM/SEC
BH CV ECHO MEAS - AO V2 MEAN: 110 CM/SEC
BH CV ECHO MEAS - AO V2 VTI: 38.6 CM
BH CV ECHO MEAS - AVA(I,A): 2.5 CM^2
BH CV ECHO MEAS - AVA(I,D): 2.5 CM^2
BH CV ECHO MEAS - AVA(V,A): 2.4 CM^2
BH CV ECHO MEAS - AVA(V,D): 2.4 CM^2
BH CV ECHO MEAS - BSA(HAYCOCK): 2.9 M^2
BH CV ECHO MEAS - BSA: 2.7 M^2
BH CV ECHO MEAS - BZI_BMI: 47.3 KILOGRAMS/M^2
BH CV ECHO MEAS - BZI_METRIC_HEIGHT: 182.9 CM
BH CV ECHO MEAS - BZI_METRIC_WEIGHT: 158.3 KG
BH CV ECHO MEAS - CONTRAST EF 4CH: 51.7 ML/M^2
BH CV ECHO MEAS - EDV(CUBED): 106.5 ML
BH CV ECHO MEAS - EDV(MOD-SP4): 200 ML
BH CV ECHO MEAS - EDV(TEICH): 104.4 ML
BH CV ECHO MEAS - EF(CUBED): 67.2 %
BH CV ECHO MEAS - EF(TEICH): 58.7 %
BH CV ECHO MEAS - ESV(CUBED): 35 ML
BH CV ECHO MEAS - ESV(MOD-SP4): 96.7 ML
BH CV ECHO MEAS - ESV(TEICH): 43.2 ML
BH CV ECHO MEAS - FS: 31 %
BH CV ECHO MEAS - IVS/LVPW: 0.9
BH CV ECHO MEAS - IVSD: 0.94 CM
BH CV ECHO MEAS - LA DIMENSION: 4.3 CM
BH CV ECHO MEAS - LA/AO: 1.3
BH CV ECHO MEAS - LAT PEAK E' VEL: 8.3 CM/SEC
BH CV ECHO MEAS - LV DIASTOLIC VOL/BSA (35-75): 74.1 ML/M^2
BH CV ECHO MEAS - LV MASS(C)D: 164.4 GRAMS
BH CV ECHO MEAS - LV MASS(C)DI: 60.9 GRAMS/M^2
BH CV ECHO MEAS - LV MAX PG: 3.8 MMHG
BH CV ECHO MEAS - LV MEAN PG: 2 MMHG
BH CV ECHO MEAS - LV SYSTOLIC VOL/BSA (12-30): 35.8 ML/M^2
BH CV ECHO MEAS - LV V1 MAX: 97.5 CM/SEC
BH CV ECHO MEAS - LV V1 MEAN: 73 CM/SEC
BH CV ECHO MEAS - LV V1 VTI: 24.9 CM
BH CV ECHO MEAS - LVIDD: 4.7 CM
BH CV ECHO MEAS - LVIDS: 3.3 CM
BH CV ECHO MEAS - LVLD AP4: 9.3 CM
BH CV ECHO MEAS - LVLS AP4: 8.2 CM
BH CV ECHO MEAS - LVOT AREA (M): 3.8 CM^2
BH CV ECHO MEAS - LVOT AREA: 3.8 CM^2
BH CV ECHO MEAS - LVOT DIAM: 2.2 CM
BH CV ECHO MEAS - LVPWD: 1 CM
BH CV ECHO MEAS - MED PEAK E' VEL: 5.55 CM/SEC
BH CV ECHO MEAS - MV A MAX VEL: 83.9 CM/SEC
BH CV ECHO MEAS - MV DEC TIME: 0.2 SEC
BH CV ECHO MEAS - MV E MAX VEL: 107 CM/SEC
BH CV ECHO MEAS - MV E/A: 1.3
BH CV ECHO MEAS - SI(AO): 122.3 ML/M^2
BH CV ECHO MEAS - SI(CUBED): 26.5 ML/M^2
BH CV ECHO MEAS - SI(LVOT): 35.1 ML/M^2
BH CV ECHO MEAS - SI(MOD-SP4): 38.3 ML/M^2
BH CV ECHO MEAS - SI(TEICH): 22.7 ML/M^2
BH CV ECHO MEAS - SV(AO): 330.1 ML
BH CV ECHO MEAS - SV(CUBED): 71.5 ML
BH CV ECHO MEAS - SV(LVOT): 94.7 ML
BH CV ECHO MEAS - SV(MOD-SP4): 103.3 ML
BH CV ECHO MEAS - SV(TEICH): 61.2 ML
BILIRUB SERPL-MCNC: 0.7 MG/DL (ref 0.1–1)
BUN BLD-MCNC: 40 MG/DL (ref 5–21)
BUN/CREAT SERPL: 20.5 (ref 7–25)
CALCIUM SPEC-SCNC: 8.9 MG/DL (ref 8.4–10.4)
CHLORIDE SERPL-SCNC: 100 MMOL/L (ref 98–110)
CO2 SERPL-SCNC: 25 MMOL/L (ref 24–31)
CREAT BLD-MCNC: 1.95 MG/DL (ref 0.5–1.4)
CRP SERPL-MCNC: 1.1 MG/DL (ref 0–0.99)
DEPRECATED RDW RBC AUTO: 43.8 FL (ref 40–54)
E/E' RATIO: 19.3
ERYTHROCYTE [DISTWIDTH] IN BLOOD BY AUTOMATED COUNT: 14 % (ref 12–15)
GFR SERPL CREATININE-BSD FRML MDRD: 35 ML/MIN/1.73
GLOBULIN UR ELPH-MCNC: 2.3 GM/DL
GLUCOSE BLD-MCNC: 268 MG/DL (ref 70–100)
GLUCOSE BLDC GLUCOMTR-MCNC: 278 MG/DL (ref 70–130)
GLUCOSE BLDC GLUCOMTR-MCNC: 279 MG/DL (ref 70–130)
GLUCOSE BLDC GLUCOMTR-MCNC: 295 MG/DL (ref 70–130)
GLUCOSE BLDC GLUCOMTR-MCNC: 316 MG/DL (ref 70–130)
HCT VFR BLD AUTO: 31.8 % (ref 40–52)
HGB BLD-MCNC: 10.8 G/DL (ref 14–18)
LEFT ATRIUM VOLUME INDEX: 35.7 ML/M2
LEFT ATRIUM VOLUME: 96.3 CM3
LIPASE SERPL-CCNC: 115 U/L (ref 23–203)
MCH RBC QN AUTO: 30.4 PG (ref 28–32)
MCHC RBC AUTO-ENTMCNC: 34 G/DL (ref 33–36)
MCV RBC AUTO: 89.6 FL (ref 82–95)
PLATELET # BLD AUTO: 218 10*3/MM3 (ref 130–400)
PMV BLD AUTO: 11 FL (ref 6–12)
POTASSIUM BLD-SCNC: 5.2 MMOL/L (ref 3.5–5.3)
PROT SERPL-MCNC: 5.8 G/DL (ref 6.3–8.7)
RBC # BLD AUTO: 3.55 10*6/MM3 (ref 4.8–5.9)
SODIUM BLD-SCNC: 135 MMOL/L (ref 135–145)
WBC NRBC COR # BLD: 8.51 10*3/MM3 (ref 4.8–10.8)

## 2017-03-28 PROCEDURE — 82962 GLUCOSE BLOOD TEST: CPT

## 2017-03-28 PROCEDURE — 86140 C-REACTIVE PROTEIN: CPT | Performed by: INTERNAL MEDICINE

## 2017-03-28 PROCEDURE — 25010000002 ONDANSETRON PER 1 MG: Performed by: INTERNAL MEDICINE

## 2017-03-28 PROCEDURE — 85027 COMPLETE CBC AUTOMATED: CPT | Performed by: INTERNAL MEDICINE

## 2017-03-28 PROCEDURE — 63710000001 INSULIN LISPRO (HUMAN) PER 5 UNITS: Performed by: INTERNAL MEDICINE

## 2017-03-28 PROCEDURE — 80053 COMPREHEN METABOLIC PANEL: CPT | Performed by: INTERNAL MEDICINE

## 2017-03-28 PROCEDURE — 25010000002 HYDROMORPHONE PER 4 MG: Performed by: INTERNAL MEDICINE

## 2017-03-28 PROCEDURE — 25010000002 ENOXAPARIN PER 10 MG: Performed by: INTERNAL MEDICINE

## 2017-03-28 PROCEDURE — 83690 ASSAY OF LIPASE: CPT | Performed by: INTERNAL MEDICINE

## 2017-03-28 RX ORDER — TRAZODONE HYDROCHLORIDE 50 MG/1
50 TABLET ORAL NIGHTLY
Status: DISCONTINUED | OUTPATIENT
Start: 2017-03-28 | End: 2017-03-30 | Stop reason: HOSPADM

## 2017-03-28 RX ORDER — ESCITALOPRAM OXALATE 10 MG/1
10 TABLET ORAL DAILY
Status: DISCONTINUED | OUTPATIENT
Start: 2017-03-28 | End: 2017-03-30 | Stop reason: HOSPADM

## 2017-03-28 RX ADMIN — HYDROMORPHONE HYDROCHLORIDE 0.5 MG: 1 INJECTION, SOLUTION INTRAMUSCULAR; INTRAVENOUS; SUBCUTANEOUS at 03:54

## 2017-03-28 RX ADMIN — ENOXAPARIN SODIUM 40 MG: 40 INJECTION SUBCUTANEOUS at 18:21

## 2017-03-28 RX ADMIN — HYDROMORPHONE HYDROCHLORIDE 0.5 MG: 1 INJECTION, SOLUTION INTRAMUSCULAR; INTRAVENOUS; SUBCUTANEOUS at 21:16

## 2017-03-28 RX ADMIN — NEBIVOLOL HYDROCHLORIDE 5 MG: 5 TABLET ORAL at 08:46

## 2017-03-28 RX ADMIN — HYDROMORPHONE HYDROCHLORIDE 0.5 MG: 1 INJECTION, SOLUTION INTRAMUSCULAR; INTRAVENOUS; SUBCUTANEOUS at 13:07

## 2017-03-28 RX ADMIN — INSULIN LISPRO 6 UNITS: 100 INJECTION, SOLUTION INTRAVENOUS; SUBCUTANEOUS at 18:23

## 2017-03-28 RX ADMIN — ONDANSETRON HYDROCHLORIDE 4 MG: 2 SOLUTION INTRAMUSCULAR; INTRAVENOUS at 23:00

## 2017-03-28 RX ADMIN — TRAZODONE HYDROCHLORIDE 50 MG: 50 TABLET ORAL at 22:21

## 2017-03-28 RX ADMIN — ONDANSETRON HYDROCHLORIDE 4 MG: 2 SOLUTION INTRAMUSCULAR; INTRAVENOUS at 17:16

## 2017-03-28 RX ADMIN — ASPIRIN 81 MG: 81 TABLET ORAL at 08:46

## 2017-03-28 RX ADMIN — ENOXAPARIN SODIUM 40 MG: 40 INJECTION SUBCUTANEOUS at 06:06

## 2017-03-28 RX ADMIN — INSULIN LISPRO 4 UNITS: 100 INJECTION, SOLUTION INTRAVENOUS; SUBCUTANEOUS at 12:08

## 2017-03-28 RX ADMIN — ONDANSETRON HYDROCHLORIDE 4 MG: 2 SOLUTION INTRAMUSCULAR; INTRAVENOUS at 10:28

## 2017-03-28 RX ADMIN — HYDROMORPHONE HYDROCHLORIDE 0.5 MG: 1 INJECTION, SOLUTION INTRAMUSCULAR; INTRAVENOUS; SUBCUTANEOUS at 17:16

## 2017-03-28 RX ADMIN — INSULIN LISPRO 4 UNITS: 100 INJECTION, SOLUTION INTRAVENOUS; SUBCUTANEOUS at 08:46

## 2017-03-28 RX ADMIN — PANTOPRAZOLE SODIUM 40 MG: 40 INJECTION, POWDER, FOR SOLUTION INTRAVENOUS at 06:06

## 2017-03-28 RX ADMIN — HYDROMORPHONE HYDROCHLORIDE 0.5 MG: 1 INJECTION, SOLUTION INTRAMUSCULAR; INTRAVENOUS; SUBCUTANEOUS at 08:51

## 2017-03-28 RX ADMIN — ESCITALOPRAM 10 MG: 10 TABLET, FILM COATED ORAL at 18:21

## 2017-03-28 RX ADMIN — ONDANSETRON HYDROCHLORIDE 4 MG: 2 SOLUTION INTRAMUSCULAR; INTRAVENOUS at 03:54

## 2017-03-29 LAB
ALBUMIN SERPL-MCNC: 3.6 G/DL (ref 3.5–5)
ALBUMIN/GLOB SERPL: 1.3 G/DL (ref 1.1–2.5)
ALP SERPL-CCNC: 56 U/L (ref 24–120)
ALT SERPL W P-5'-P-CCNC: 23 U/L (ref 0–54)
ANION GAP SERPL CALCULATED.3IONS-SCNC: 11 MMOL/L (ref 4–13)
AST SERPL-CCNC: 27 U/L (ref 7–45)
BASOPHILS # BLD AUTO: 0.02 10*3/MM3 (ref 0–0.2)
BASOPHILS NFR BLD AUTO: 0.3 % (ref 0–2)
BILIRUB SERPL-MCNC: 0.7 MG/DL (ref 0.1–1)
BUN BLD-MCNC: 34 MG/DL (ref 5–21)
BUN/CREAT SERPL: 18.3 (ref 7–25)
CALCIUM SPEC-SCNC: 9 MG/DL (ref 8.4–10.4)
CHLORIDE SERPL-SCNC: 98 MMOL/L (ref 98–110)
CO2 SERPL-SCNC: 29 MMOL/L (ref 24–31)
CREAT BLD-MCNC: 1.86 MG/DL (ref 0.5–1.4)
CRP SERPL-MCNC: 1.13 MG/DL (ref 0–0.99)
DEPRECATED RDW RBC AUTO: 44.4 FL (ref 40–54)
EOSINOPHIL # BLD AUTO: 0.39 10*3/MM3 (ref 0–0.7)
EOSINOPHIL NFR BLD AUTO: 5 % (ref 0–4)
ERYTHROCYTE [DISTWIDTH] IN BLOOD BY AUTOMATED COUNT: 13.7 % (ref 12–15)
GFR SERPL CREATININE-BSD FRML MDRD: 37 ML/MIN/1.73
GLOBULIN UR ELPH-MCNC: 2.7 GM/DL
GLUCOSE BLD-MCNC: 288 MG/DL (ref 70–100)
GLUCOSE BLDC GLUCOMTR-MCNC: 168 MG/DL (ref 70–130)
GLUCOSE BLDC GLUCOMTR-MCNC: 283 MG/DL (ref 70–130)
GLUCOSE BLDC GLUCOMTR-MCNC: 295 MG/DL (ref 70–130)
GLUCOSE BLDC GLUCOMTR-MCNC: 326 MG/DL (ref 70–130)
GLUCOSE BLDC GLUCOMTR-MCNC: 354 MG/DL (ref 70–130)
HCT VFR BLD AUTO: 32.2 % (ref 40–52)
HGB BLD-MCNC: 10.7 G/DL (ref 14–18)
IMM GRANULOCYTES # BLD: 0.04 10*3/MM3 (ref 0–0.03)
IMM GRANULOCYTES NFR BLD: 0.5 % (ref 0–5)
LIPASE SERPL-CCNC: 60 U/L (ref 23–203)
LYMPHOCYTES # BLD AUTO: 1.27 10*3/MM3 (ref 0.72–4.86)
LYMPHOCYTES NFR BLD AUTO: 16.2 % (ref 15–45)
MCH RBC QN AUTO: 29.6 PG (ref 28–32)
MCHC RBC AUTO-ENTMCNC: 33.2 G/DL (ref 33–36)
MCV RBC AUTO: 89 FL (ref 82–95)
MONOCYTES # BLD AUTO: 0.74 10*3/MM3 (ref 0.19–1.3)
MONOCYTES NFR BLD AUTO: 9.4 % (ref 4–12)
NEUTROPHILS # BLD AUTO: 5.4 10*3/MM3 (ref 1.87–8.4)
NEUTROPHILS NFR BLD AUTO: 68.6 % (ref 39–78)
PLATELET # BLD AUTO: 207 10*3/MM3 (ref 130–400)
PMV BLD AUTO: 10.8 FL (ref 6–12)
POTASSIUM BLD-SCNC: 5 MMOL/L (ref 3.5–5.3)
PROT SERPL-MCNC: 6.3 G/DL (ref 6.3–8.7)
RBC # BLD AUTO: 3.62 10*6/MM3 (ref 4.8–5.9)
SODIUM BLD-SCNC: 138 MMOL/L (ref 135–145)
WBC NRBC COR # BLD: 7.86 10*3/MM3 (ref 4.8–10.8)

## 2017-03-29 PROCEDURE — 86140 C-REACTIVE PROTEIN: CPT | Performed by: NURSE PRACTITIONER

## 2017-03-29 PROCEDURE — 63710000001 INSULIN REGULAR HUMAN PER 5 UNITS: Performed by: INTERNAL MEDICINE

## 2017-03-29 PROCEDURE — 25010000002 ONDANSETRON PER 1 MG: Performed by: INTERNAL MEDICINE

## 2017-03-29 PROCEDURE — 82962 GLUCOSE BLOOD TEST: CPT

## 2017-03-29 PROCEDURE — 99222 1ST HOSP IP/OBS MODERATE 55: CPT | Performed by: INTERNAL MEDICINE

## 2017-03-29 PROCEDURE — 25010000002 ENOXAPARIN PER 10 MG: Performed by: INTERNAL MEDICINE

## 2017-03-29 PROCEDURE — 83690 ASSAY OF LIPASE: CPT | Performed by: NURSE PRACTITIONER

## 2017-03-29 PROCEDURE — 85025 COMPLETE CBC W/AUTO DIFF WBC: CPT | Performed by: INTERNAL MEDICINE

## 2017-03-29 PROCEDURE — 63710000001 INSULIN REGULAR HUMAN PER 5 UNITS: Performed by: NURSE PRACTITIONER

## 2017-03-29 PROCEDURE — 80053 COMPREHEN METABOLIC PANEL: CPT | Performed by: NURSE PRACTITIONER

## 2017-03-29 PROCEDURE — 25010000002 HYDROMORPHONE PER 4 MG: Performed by: INTERNAL MEDICINE

## 2017-03-29 RX ORDER — BUMETANIDE 2 MG/1
2 TABLET ORAL DAILY
Status: DISCONTINUED | OUTPATIENT
Start: 2017-03-29 | End: 2017-03-30

## 2017-03-29 RX ADMIN — ENOXAPARIN SODIUM 40 MG: 40 INJECTION SUBCUTANEOUS at 17:20

## 2017-03-29 RX ADMIN — ONDANSETRON HYDROCHLORIDE 4 MG: 2 SOLUTION INTRAMUSCULAR; INTRAVENOUS at 05:52

## 2017-03-29 RX ADMIN — BUMETANIDE 2 MG: 2 TABLET ORAL at 17:19

## 2017-03-29 RX ADMIN — INSULIN HUMAN 25 UNITS: 100 INJECTION, SOLUTION PARENTERAL at 13:58

## 2017-03-29 RX ADMIN — HYDROMORPHONE HYDROCHLORIDE 0.5 MG: 1 INJECTION, SOLUTION INTRAMUSCULAR; INTRAVENOUS; SUBCUTANEOUS at 21:54

## 2017-03-29 RX ADMIN — ESCITALOPRAM 10 MG: 10 TABLET, FILM COATED ORAL at 08:16

## 2017-03-29 RX ADMIN — HYDROMORPHONE HYDROCHLORIDE 0.5 MG: 1 INJECTION, SOLUTION INTRAMUSCULAR; INTRAVENOUS; SUBCUTANEOUS at 06:50

## 2017-03-29 RX ADMIN — HYDROMORPHONE HYDROCHLORIDE 0.5 MG: 1 INJECTION, SOLUTION INTRAMUSCULAR; INTRAVENOUS; SUBCUTANEOUS at 02:46

## 2017-03-29 RX ADMIN — HYDROMORPHONE HYDROCHLORIDE 0.5 MG: 1 INJECTION, SOLUTION INTRAMUSCULAR; INTRAVENOUS; SUBCUTANEOUS at 17:19

## 2017-03-29 RX ADMIN — ONDANSETRON HYDROCHLORIDE 4 MG: 2 SOLUTION INTRAMUSCULAR; INTRAVENOUS at 17:19

## 2017-03-29 RX ADMIN — NEBIVOLOL HYDROCHLORIDE 5 MG: 5 TABLET ORAL at 08:16

## 2017-03-29 RX ADMIN — ASPIRIN 81 MG: 81 TABLET ORAL at 08:16

## 2017-03-29 RX ADMIN — PANTOPRAZOLE SODIUM 40 MG: 40 INJECTION, POWDER, FOR SOLUTION INTRAVENOUS at 05:50

## 2017-03-29 RX ADMIN — SODIUM CHLORIDE 20 ML/HR: 9 INJECTION, SOLUTION INTRAVENOUS at 04:38

## 2017-03-29 RX ADMIN — ENOXAPARIN SODIUM 40 MG: 40 INJECTION SUBCUTANEOUS at 05:50

## 2017-03-29 RX ADMIN — INSULIN HUMAN 50 UNITS: 100 INJECTION, SOLUTION PARENTERAL at 18:23

## 2017-03-29 RX ADMIN — HYDROMORPHONE HYDROCHLORIDE 0.5 MG: 1 INJECTION, SOLUTION INTRAMUSCULAR; INTRAVENOUS; SUBCUTANEOUS at 11:26

## 2017-03-29 RX ADMIN — TRAZODONE HYDROCHLORIDE 50 MG: 50 TABLET ORAL at 22:06

## 2017-03-30 VITALS
OXYGEN SATURATION: 97 % | TEMPERATURE: 97.8 F | HEART RATE: 59 BPM | DIASTOLIC BLOOD PRESSURE: 59 MMHG | BODY MASS INDEX: 42.66 KG/M2 | RESPIRATION RATE: 16 BRPM | HEIGHT: 72 IN | SYSTOLIC BLOOD PRESSURE: 130 MMHG | WEIGHT: 315 LBS

## 2017-03-30 LAB
ANION GAP SERPL CALCULATED.3IONS-SCNC: 10 MMOL/L (ref 4–13)
BASOPHILS # BLD AUTO: 0.02 10*3/MM3 (ref 0–0.2)
BASOPHILS NFR BLD AUTO: 0.2 % (ref 0–2)
BUN BLD-MCNC: 35 MG/DL (ref 5–21)
BUN/CREAT SERPL: 18 (ref 7–25)
CALCIUM SPEC-SCNC: 8.8 MG/DL (ref 8.4–10.4)
CHLORIDE SERPL-SCNC: 99 MMOL/L (ref 98–110)
CO2 SERPL-SCNC: 29 MMOL/L (ref 24–31)
CREAT BLD-MCNC: 1.94 MG/DL (ref 0.5–1.4)
DEPRECATED RDW RBC AUTO: 43.9 FL (ref 40–54)
EOSINOPHIL # BLD AUTO: 0.34 10*3/MM3 (ref 0–0.7)
EOSINOPHIL NFR BLD AUTO: 3.5 % (ref 0–4)
ERYTHROCYTE [DISTWIDTH] IN BLOOD BY AUTOMATED COUNT: 13.7 % (ref 12–15)
GFR SERPL CREATININE-BSD FRML MDRD: 35 ML/MIN/1.73
GLUCOSE BLD-MCNC: 152 MG/DL (ref 70–100)
GLUCOSE BLDC GLUCOMTR-MCNC: 104 MG/DL (ref 70–130)
GLUCOSE BLDC GLUCOMTR-MCNC: 225 MG/DL (ref 70–130)
GLUCOSE BLDC GLUCOMTR-MCNC: 232 MG/DL (ref 70–130)
GLUCOSE BLDC GLUCOMTR-MCNC: 77 MG/DL (ref 70–130)
HCT VFR BLD AUTO: 32 % (ref 40–52)
HGB BLD-MCNC: 10.7 G/DL (ref 14–18)
IMM GRANULOCYTES # BLD: 0.04 10*3/MM3 (ref 0–0.03)
IMM GRANULOCYTES NFR BLD: 0.4 % (ref 0–5)
LYMPHOCYTES # BLD AUTO: 1.41 10*3/MM3 (ref 0.72–4.86)
LYMPHOCYTES NFR BLD AUTO: 14.3 % (ref 15–45)
MCH RBC QN AUTO: 29.7 PG (ref 28–32)
MCHC RBC AUTO-ENTMCNC: 33.4 G/DL (ref 33–36)
MCV RBC AUTO: 88.9 FL (ref 82–95)
MONOCYTES # BLD AUTO: 0.95 10*3/MM3 (ref 0.19–1.3)
MONOCYTES NFR BLD AUTO: 9.7 % (ref 4–12)
NEUTROPHILS # BLD AUTO: 7.07 10*3/MM3 (ref 1.87–8.4)
NEUTROPHILS NFR BLD AUTO: 71.9 % (ref 39–78)
PLATELET # BLD AUTO: 217 10*3/MM3 (ref 130–400)
PMV BLD AUTO: 11.2 FL (ref 6–12)
POTASSIUM BLD-SCNC: 4.7 MMOL/L (ref 3.5–5.3)
RBC # BLD AUTO: 3.6 10*6/MM3 (ref 4.8–5.9)
SODIUM BLD-SCNC: 138 MMOL/L (ref 135–145)
WBC NRBC COR # BLD: 9.83 10*3/MM3 (ref 4.8–10.8)

## 2017-03-30 PROCEDURE — 82962 GLUCOSE BLOOD TEST: CPT

## 2017-03-30 PROCEDURE — 25010000002 HYDROMORPHONE PER 4 MG: Performed by: INTERNAL MEDICINE

## 2017-03-30 PROCEDURE — 25010000002 ENOXAPARIN PER 10 MG: Performed by: INTERNAL MEDICINE

## 2017-03-30 PROCEDURE — 80048 BASIC METABOLIC PNL TOTAL CA: CPT | Performed by: NURSE PRACTITIONER

## 2017-03-30 PROCEDURE — 99231 SBSQ HOSP IP/OBS SF/LOW 25: CPT | Performed by: CLINICAL NURSE SPECIALIST

## 2017-03-30 PROCEDURE — 85025 COMPLETE CBC W/AUTO DIFF WBC: CPT | Performed by: INTERNAL MEDICINE

## 2017-03-30 PROCEDURE — 25010000002 ONDANSETRON PER 1 MG: Performed by: INTERNAL MEDICINE

## 2017-03-30 RX ORDER — BUMETANIDE 1 MG/1
1 TABLET ORAL DAILY
Status: DISCONTINUED | OUTPATIENT
Start: 2017-03-31 | End: 2017-03-30 | Stop reason: HOSPADM

## 2017-03-30 RX ADMIN — ONDANSETRON HYDROCHLORIDE 4 MG: 2 SOLUTION INTRAMUSCULAR; INTRAVENOUS at 01:57

## 2017-03-30 RX ADMIN — ESCITALOPRAM 10 MG: 10 TABLET, FILM COATED ORAL at 08:03

## 2017-03-30 RX ADMIN — BUMETANIDE 2 MG: 2 TABLET ORAL at 08:03

## 2017-03-30 RX ADMIN — ENOXAPARIN SODIUM 40 MG: 40 INJECTION SUBCUTANEOUS at 06:38

## 2017-03-30 RX ADMIN — HYDROMORPHONE HYDROCHLORIDE 0.5 MG: 1 INJECTION, SOLUTION INTRAMUSCULAR; INTRAVENOUS; SUBCUTANEOUS at 06:52

## 2017-03-30 RX ADMIN — HYDROMORPHONE HYDROCHLORIDE 0.5 MG: 1 INJECTION, SOLUTION INTRAMUSCULAR; INTRAVENOUS; SUBCUTANEOUS at 01:57

## 2017-03-30 RX ADMIN — INSULIN HUMAN 50 UNITS: 100 INJECTION, SOLUTION PARENTERAL at 15:06

## 2017-03-30 RX ADMIN — ASPIRIN 81 MG: 81 TABLET ORAL at 08:03

## 2017-03-30 RX ADMIN — NEBIVOLOL HYDROCHLORIDE 5 MG: 5 TABLET ORAL at 08:03

## 2017-03-30 RX ADMIN — INSULIN HUMAN 50 UNITS: 100 INJECTION, SOLUTION PARENTERAL at 08:02

## 2017-03-30 RX ADMIN — PANTOPRAZOLE SODIUM 40 MG: 40 INJECTION, POWDER, FOR SOLUTION INTRAVENOUS at 06:38

## 2017-04-13 ENCOUNTER — LAB (OUTPATIENT)
Dept: LAB | Facility: HOSPITAL | Age: 62
End: 2017-04-13

## 2017-04-13 ENCOUNTER — TRANSCRIBE ORDERS (OUTPATIENT)
Dept: LAB | Facility: HOSPITAL | Age: 62
End: 2017-04-13

## 2017-04-13 DIAGNOSIS — R10.9 STOMACH ACHE: ICD-10-CM

## 2017-04-13 DIAGNOSIS — R10.9 STOMACH ACHE: Primary | ICD-10-CM

## 2017-04-13 LAB
ALBUMIN SERPL-MCNC: 4.1 G/DL (ref 3.5–5)
ALBUMIN/GLOB SERPL: 1.5 G/DL (ref 1.1–2.5)
ALP SERPL-CCNC: 58 U/L (ref 24–120)
ALT SERPL W P-5'-P-CCNC: 30 U/L (ref 0–54)
AMYLASE SERPL-CCNC: 87 U/L (ref 30–110)
ANION GAP SERPL CALCULATED.3IONS-SCNC: 11 MMOL/L (ref 4–13)
AST SERPL-CCNC: 37 U/L (ref 7–45)
BILIRUB SERPL-MCNC: 0.5 MG/DL (ref 0.1–1)
BUN BLD-MCNC: 47 MG/DL (ref 5–21)
BUN/CREAT SERPL: 22.6 (ref 7–25)
CALCIUM SPEC-SCNC: 9.3 MG/DL (ref 8.4–10.4)
CHLORIDE SERPL-SCNC: 94 MMOL/L (ref 98–110)
CO2 SERPL-SCNC: 27 MMOL/L (ref 24–31)
CREAT BLD-MCNC: 2.08 MG/DL (ref 0.5–1.4)
GFR SERPL CREATININE-BSD FRML MDRD: 33 ML/MIN/1.73
GLOBULIN UR ELPH-MCNC: 2.8 GM/DL
GLUCOSE BLD-MCNC: 300 MG/DL (ref 70–100)
LIPASE SERPL-CCNC: 197 U/L (ref 23–203)
POTASSIUM BLD-SCNC: 5.1 MMOL/L (ref 3.5–5.3)
PROT SERPL-MCNC: 6.9 G/DL (ref 6.3–8.7)
SODIUM BLD-SCNC: 132 MMOL/L (ref 135–145)

## 2017-04-13 PROCEDURE — 36415 COLL VENOUS BLD VENIPUNCTURE: CPT

## 2017-04-13 PROCEDURE — 82150 ASSAY OF AMYLASE: CPT

## 2017-04-13 PROCEDURE — 80053 COMPREHEN METABOLIC PANEL: CPT

## 2017-04-13 PROCEDURE — 83690 ASSAY OF LIPASE: CPT

## 2017-04-23 ENCOUNTER — HOSPITAL ENCOUNTER (OUTPATIENT)
Facility: HOSPITAL | Age: 62
Setting detail: OBSERVATION
Discharge: HOME | End: 2017-04-24
Attending: FAMILY MEDICINE | Admitting: INTERNAL MEDICINE

## 2017-04-23 ENCOUNTER — APPOINTMENT (OUTPATIENT)
Dept: GENERAL RADIOLOGY | Facility: HOSPITAL | Age: 62
End: 2017-04-23

## 2017-04-23 DIAGNOSIS — R06.00 DYSPNEA, UNSPECIFIED TYPE: ICD-10-CM

## 2017-04-23 DIAGNOSIS — R07.9 CHEST PAIN, UNSPECIFIED TYPE: Primary | ICD-10-CM

## 2017-04-23 LAB
ALBUMIN SERPL-MCNC: 4 G/DL (ref 3.5–5)
ALBUMIN/GLOB SERPL: 1.4 G/DL (ref 1.1–2.5)
ALP SERPL-CCNC: 57 U/L (ref 24–120)
ALT SERPL W P-5'-P-CCNC: 23 U/L (ref 0–54)
AMYLASE SERPL-CCNC: 79 U/L (ref 30–110)
ANION GAP SERPL CALCULATED.3IONS-SCNC: 12 MMOL/L (ref 4–13)
APTT PPP: 27.6 SECONDS (ref 24.1–34.8)
AST SERPL-CCNC: 44 U/L (ref 7–45)
BASOPHILS # BLD AUTO: 0.04 10*3/MM3 (ref 0–0.2)
BASOPHILS NFR BLD AUTO: 0.3 % (ref 0–2)
BILIRUB SERPL-MCNC: 0.5 MG/DL (ref 0.1–1)
BUN BLD-MCNC: 65 MG/DL (ref 5–21)
BUN/CREAT SERPL: 26.4 (ref 7–25)
CALCIUM SPEC-SCNC: 8.8 MG/DL (ref 8.4–10.4)
CHLORIDE SERPL-SCNC: 94 MMOL/L (ref 98–110)
CK MB SERPL-CCNC: 2.75 NG/ML (ref 0–5)
CK SERPL-CCNC: 223 U/L (ref 0–203)
CO2 SERPL-SCNC: 27 MMOL/L (ref 24–31)
CREAT BLD-MCNC: 2.46 MG/DL (ref 0.5–1.4)
D DIMER PPP FEU-MCNC: 0.37 MG/L (FEU) (ref 0–0.5)
DEPRECATED RDW RBC AUTO: 43.1 FL (ref 40–54)
EOSINOPHIL # BLD AUTO: 0.51 10*3/MM3 (ref 0–0.7)
EOSINOPHIL NFR BLD AUTO: 4.3 % (ref 0–4)
ERYTHROCYTE [DISTWIDTH] IN BLOOD BY AUTOMATED COUNT: 13.5 % (ref 12–15)
GFR SERPL CREATININE-BSD FRML MDRD: 27 ML/MIN/1.73
GLOBULIN UR ELPH-MCNC: 2.9 GM/DL
GLUCOSE BLD-MCNC: 310 MG/DL (ref 70–100)
HCT VFR BLD AUTO: 34.1 % (ref 40–52)
HGB BLD-MCNC: 11.5 G/DL (ref 14–18)
HOLD SPECIMEN: NORMAL
HOLD SPECIMEN: NORMAL
IMM GRANULOCYTES # BLD: 0.05 10*3/MM3 (ref 0–0.03)
IMM GRANULOCYTES NFR BLD: 0.4 % (ref 0–5)
INR PPP: 0.92 (ref 0.91–1.09)
LIPASE SERPL-CCNC: 76 U/L (ref 23–203)
LYMPHOCYTES # BLD AUTO: 1.93 10*3/MM3 (ref 0.72–4.86)
LYMPHOCYTES NFR BLD AUTO: 16.3 % (ref 15–45)
MCH RBC QN AUTO: 29.6 PG (ref 28–32)
MCHC RBC AUTO-ENTMCNC: 33.7 G/DL (ref 33–36)
MCV RBC AUTO: 87.9 FL (ref 82–95)
MONOCYTES # BLD AUTO: 0.97 10*3/MM3 (ref 0.19–1.3)
MONOCYTES NFR BLD AUTO: 8.2 % (ref 4–12)
NEUTROPHILS # BLD AUTO: 8.36 10*3/MM3 (ref 1.87–8.4)
NEUTROPHILS NFR BLD AUTO: 70.5 % (ref 39–78)
NT-PROBNP SERPL-MCNC: 808 PG/ML (ref 0–900)
PLATELET # BLD AUTO: 261 10*3/MM3 (ref 130–400)
PMV BLD AUTO: 11.7 FL (ref 6–12)
POTASSIUM BLD-SCNC: 5.1 MMOL/L (ref 3.5–5.3)
PROT SERPL-MCNC: 6.9 G/DL (ref 6.3–8.7)
PROTHROMBIN TIME: 12.6 SECONDS (ref 11.9–14.6)
RBC # BLD AUTO: 3.88 10*6/MM3 (ref 4.8–5.9)
SODIUM BLD-SCNC: 133 MMOL/L (ref 135–145)
TROPONIN I SERPL-MCNC: <0.012 NG/ML (ref 0–0.03)
WBC NRBC COR # BLD: 11.86 10*3/MM3 (ref 4.8–10.8)
WHOLE BLOOD HOLD SPECIMEN: NORMAL
WHOLE BLOOD HOLD SPECIMEN: NORMAL

## 2017-04-23 PROCEDURE — 93005 ELECTROCARDIOGRAM TRACING: CPT

## 2017-04-23 PROCEDURE — 82550 ASSAY OF CK (CPK): CPT | Performed by: FAMILY MEDICINE

## 2017-04-23 PROCEDURE — 83880 ASSAY OF NATRIURETIC PEPTIDE: CPT | Performed by: FAMILY MEDICINE

## 2017-04-23 PROCEDURE — 25010000002 ONDANSETRON PER 1 MG: Performed by: FAMILY MEDICINE

## 2017-04-23 PROCEDURE — 82150 ASSAY OF AMYLASE: CPT | Performed by: FAMILY MEDICINE

## 2017-04-23 PROCEDURE — 71010 HC CHEST PA OR AP: CPT

## 2017-04-23 PROCEDURE — 99285 EMERGENCY DEPT VISIT HI MDM: CPT

## 2017-04-23 PROCEDURE — 85610 PROTHROMBIN TIME: CPT | Performed by: FAMILY MEDICINE

## 2017-04-23 PROCEDURE — 25010000002 ENOXAPARIN PER 10 MG: Performed by: FAMILY MEDICINE

## 2017-04-23 PROCEDURE — 82553 CREATINE MB FRACTION: CPT | Performed by: FAMILY MEDICINE

## 2017-04-23 PROCEDURE — 85025 COMPLETE CBC W/AUTO DIFF WBC: CPT | Performed by: FAMILY MEDICINE

## 2017-04-23 PROCEDURE — 96375 TX/PRO/DX INJ NEW DRUG ADDON: CPT

## 2017-04-23 PROCEDURE — G0378 HOSPITAL OBSERVATION PER HR: HCPCS

## 2017-04-23 PROCEDURE — 96372 THER/PROPH/DIAG INJ SC/IM: CPT

## 2017-04-23 PROCEDURE — 25010000002 MORPHINE SULFATE (PF) 2 MG/ML SOLUTION: Performed by: FAMILY MEDICINE

## 2017-04-23 PROCEDURE — 84484 ASSAY OF TROPONIN QUANT: CPT | Performed by: FAMILY MEDICINE

## 2017-04-23 PROCEDURE — 85379 FIBRIN DEGRADATION QUANT: CPT | Performed by: FAMILY MEDICINE

## 2017-04-23 PROCEDURE — 96374 THER/PROPH/DIAG INJ IV PUSH: CPT

## 2017-04-23 PROCEDURE — 93010 ELECTROCARDIOGRAM REPORT: CPT | Performed by: INTERNAL MEDICINE

## 2017-04-23 PROCEDURE — 83690 ASSAY OF LIPASE: CPT | Performed by: FAMILY MEDICINE

## 2017-04-23 PROCEDURE — 85730 THROMBOPLASTIN TIME PARTIAL: CPT | Performed by: FAMILY MEDICINE

## 2017-04-23 PROCEDURE — 80053 COMPREHEN METABOLIC PANEL: CPT | Performed by: FAMILY MEDICINE

## 2017-04-23 RX ORDER — MORPHINE SULFATE 2 MG/ML
2 INJECTION, SOLUTION INTRAMUSCULAR; INTRAVENOUS ONCE
Status: DISCONTINUED | OUTPATIENT
Start: 2017-04-23 | End: 2017-04-24 | Stop reason: HOSPADM

## 2017-04-23 RX ORDER — MORPHINE SULFATE 2 MG/ML
2 INJECTION, SOLUTION INTRAMUSCULAR; INTRAVENOUS ONCE
Status: COMPLETED | OUTPATIENT
Start: 2017-04-23 | End: 2017-04-23

## 2017-04-23 RX ORDER — NITROGLYCERIN 0.4 MG/1
0.4 TABLET SUBLINGUAL
Status: DISCONTINUED | OUTPATIENT
Start: 2017-04-23 | End: 2017-04-24 | Stop reason: HOSPADM

## 2017-04-23 RX ORDER — ASPIRIN 81 MG/1
243 TABLET, CHEWABLE ORAL ONCE
Status: COMPLETED | OUTPATIENT
Start: 2017-04-23 | End: 2017-04-23

## 2017-04-23 RX ORDER — ACETAMINOPHEN 80 MG/1
240 TABLET, CHEWABLE ORAL ONCE
Status: DISCONTINUED | OUTPATIENT
Start: 2017-04-23 | End: 2017-04-23

## 2017-04-23 RX ORDER — ONDANSETRON 2 MG/ML
4 INJECTION INTRAMUSCULAR; INTRAVENOUS ONCE
Status: COMPLETED | OUTPATIENT
Start: 2017-04-23 | End: 2017-04-23

## 2017-04-23 RX ADMIN — SODIUM CHLORIDE 500 ML: 9 INJECTION, SOLUTION INTRAVENOUS at 22:43

## 2017-04-23 RX ADMIN — MORPHINE SULFATE 2 MG: 2 INJECTION, SOLUTION INTRAMUSCULAR; INTRAVENOUS at 20:54

## 2017-04-23 RX ADMIN — ENOXAPARIN SODIUM 160 MG: 80 INJECTION SUBCUTANEOUS at 22:53

## 2017-04-23 RX ADMIN — ONDANSETRON 4 MG: 2 INJECTION INTRAMUSCULAR; INTRAVENOUS at 20:55

## 2017-04-23 RX ADMIN — ASPIRIN 81 MG 243 MG: 81 TABLET ORAL at 20:10

## 2017-04-24 VITALS
HEART RATE: 70 BPM | DIASTOLIC BLOOD PRESSURE: 50 MMHG | BODY MASS INDEX: 45.1 KG/M2 | OXYGEN SATURATION: 98 % | SYSTOLIC BLOOD PRESSURE: 103 MMHG | WEIGHT: 315 LBS | HEIGHT: 70 IN | TEMPERATURE: 98.2 F | RESPIRATION RATE: 18 BRPM

## 2017-04-24 LAB
ARTICHOKE IGE QN: 64 MG/DL (ref 0–99)
BILIRUB UR QL STRIP: NEGATIVE
CHOLEST SERPL-MCNC: 133 MG/DL (ref 130–200)
CK MB SERPL-CCNC: 2.91 NG/ML (ref 0–5)
CK SERPL-CCNC: 184 U/L (ref 0–203)
CLARITY UR: CLEAR
COLOR UR: YELLOW
GLUCOSE UR STRIP-MCNC: ABNORMAL MG/DL
HDLC SERPL-MCNC: 28 MG/DL
HGB UR QL STRIP.AUTO: NEGATIVE
KETONES UR QL STRIP: NEGATIVE
LDLC/HDLC SERPL: 2.05 {RATIO}
LEUKOCYTE ESTERASE UR QL STRIP.AUTO: NEGATIVE
NITRITE UR QL STRIP: NEGATIVE
PH UR STRIP.AUTO: 5.5 [PH] (ref 5–8)
PROT UR QL STRIP: NEGATIVE
SP GR UR STRIP: 1.01 (ref 1–1.03)
TRIGL SERPL-MCNC: 238 MG/DL (ref 0–149)
TROPONIN I SERPL-MCNC: <0.012 NG/ML (ref 0–0.03)
UROBILINOGEN UR QL STRIP: ABNORMAL

## 2017-04-24 PROCEDURE — 81003 URINALYSIS AUTO W/O SCOPE: CPT | Performed by: FAMILY MEDICINE

## 2017-04-24 PROCEDURE — 25010000002 MORPHINE SULFATE (PF) 2 MG/ML SOLUTION: Performed by: NURSE PRACTITIONER

## 2017-04-24 PROCEDURE — 84484 ASSAY OF TROPONIN QUANT: CPT | Performed by: INTERNAL MEDICINE

## 2017-04-24 PROCEDURE — 99219 PR INITIAL OBSERVATION CARE/DAY 50 MINUTES: CPT | Performed by: INTERNAL MEDICINE

## 2017-04-24 PROCEDURE — 93010 ELECTROCARDIOGRAM REPORT: CPT | Performed by: INTERNAL MEDICINE

## 2017-04-24 PROCEDURE — 96376 TX/PRO/DX INJ SAME DRUG ADON: CPT

## 2017-04-24 PROCEDURE — 93005 ELECTROCARDIOGRAM TRACING: CPT | Performed by: INTERNAL MEDICINE

## 2017-04-24 PROCEDURE — G0378 HOSPITAL OBSERVATION PER HR: HCPCS

## 2017-04-24 PROCEDURE — 82550 ASSAY OF CK (CPK): CPT | Performed by: INTERNAL MEDICINE

## 2017-04-24 PROCEDURE — 80061 LIPID PANEL: CPT | Performed by: INTERNAL MEDICINE

## 2017-04-24 PROCEDURE — 82553 CREATINE MB FRACTION: CPT | Performed by: INTERNAL MEDICINE

## 2017-04-24 RX ORDER — NEBIVOLOL 5 MG/1
5 TABLET ORAL
Status: DISCONTINUED | OUTPATIENT
Start: 2017-04-24 | End: 2017-04-24 | Stop reason: HOSPADM

## 2017-04-24 RX ORDER — ALLOPURINOL 100 MG/1
100 TABLET ORAL DAILY
Status: DISCONTINUED | OUTPATIENT
Start: 2017-04-24 | End: 2017-04-24 | Stop reason: HOSPADM

## 2017-04-24 RX ORDER — BUMETANIDE 2 MG/1
2 TABLET ORAL DAILY
Status: DISCONTINUED | OUTPATIENT
Start: 2017-04-24 | End: 2017-04-24 | Stop reason: HOSPADM

## 2017-04-24 RX ORDER — ONDANSETRON 4 MG/1
4 TABLET, FILM COATED ORAL EVERY 6 HOURS PRN
Status: DISCONTINUED | OUTPATIENT
Start: 2017-04-24 | End: 2017-04-24 | Stop reason: HOSPADM

## 2017-04-24 RX ORDER — NALOXONE HYDROCHLORIDE 1 MG/ML
0.4 INJECTION INTRAMUSCULAR; INTRAVENOUS; SUBCUTANEOUS AS NEEDED
Status: DISCONTINUED | OUTPATIENT
Start: 2017-04-24 | End: 2017-04-24 | Stop reason: HOSPADM

## 2017-04-24 RX ORDER — METOLAZONE 2.5 MG/1
2.5 TABLET ORAL DAILY
Status: DISCONTINUED | OUTPATIENT
Start: 2017-04-24 | End: 2017-04-24 | Stop reason: HOSPADM

## 2017-04-24 RX ORDER — LOSARTAN POTASSIUM 50 MG/1
100 TABLET ORAL
Status: DISCONTINUED | OUTPATIENT
Start: 2017-04-24 | End: 2017-04-24 | Stop reason: HOSPADM

## 2017-04-24 RX ORDER — TRAZODONE HYDROCHLORIDE 50 MG/1
50 TABLET ORAL NIGHTLY PRN
Status: DISCONTINUED | OUTPATIENT
Start: 2017-04-24 | End: 2017-04-24 | Stop reason: HOSPADM

## 2017-04-24 RX ORDER — ESCITALOPRAM OXALATE 10 MG/1
10 TABLET ORAL DAILY
Status: DISCONTINUED | OUTPATIENT
Start: 2017-04-24 | End: 2017-04-24 | Stop reason: HOSPADM

## 2017-04-24 RX ORDER — OXYCODONE AND ACETAMINOPHEN 10; 325 MG/1; MG/1
1 TABLET ORAL EVERY 4 HOURS PRN
Status: DISCONTINUED | OUTPATIENT
Start: 2017-04-24 | End: 2017-04-24 | Stop reason: HOSPADM

## 2017-04-24 RX ORDER — ASPIRIN 81 MG/1
81 TABLET ORAL DAILY
Status: DISCONTINUED | OUTPATIENT
Start: 2017-04-24 | End: 2017-04-24 | Stop reason: HOSPADM

## 2017-04-24 RX ORDER — ATORVASTATIN CALCIUM 40 MG/1
80 TABLET, FILM COATED ORAL NIGHTLY
Status: DISCONTINUED | OUTPATIENT
Start: 2017-04-24 | End: 2017-04-24 | Stop reason: HOSPADM

## 2017-04-24 RX ORDER — PANTOPRAZOLE SODIUM 40 MG/1
40 TABLET, DELAYED RELEASE ORAL DAILY
Qty: 30 TABLET | Refills: 5 | Status: SHIPPED | OUTPATIENT
Start: 2017-04-24 | End: 2017-09-19 | Stop reason: SDUPTHER

## 2017-04-24 RX ORDER — AMITRIPTYLINE HYDROCHLORIDE 50 MG/1
50 TABLET, FILM COATED ORAL NIGHTLY
Status: ON HOLD | COMMUNITY
End: 2017-05-05 | Stop reason: ALTCHOICE

## 2017-04-24 RX ORDER — PANTOPRAZOLE SODIUM 40 MG/1
40 TABLET, DELAYED RELEASE ORAL
Status: DISCONTINUED | OUTPATIENT
Start: 2017-04-24 | End: 2017-04-24 | Stop reason: HOSPADM

## 2017-04-24 RX ORDER — IRBESARTAN 150 MG/1
300 TABLET ORAL
Status: DISCONTINUED | OUTPATIENT
Start: 2017-04-24 | End: 2017-04-24 | Stop reason: CLARIF

## 2017-04-24 RX ORDER — ISOSORBIDE MONONITRATE 30 MG/1
30 TABLET, EXTENDED RELEASE ORAL DAILY
Status: DISCONTINUED | OUTPATIENT
Start: 2017-04-24 | End: 2017-04-24 | Stop reason: HOSPADM

## 2017-04-24 RX ORDER — ISOSORBIDE MONONITRATE 30 MG/1
30 TABLET, EXTENDED RELEASE ORAL DAILY
COMMUNITY
End: 2019-03-06 | Stop reason: HOSPADM

## 2017-04-24 RX ORDER — MORPHINE SULFATE 2 MG/ML
2 INJECTION, SOLUTION INTRAMUSCULAR; INTRAVENOUS
Status: DISCONTINUED | OUTPATIENT
Start: 2017-04-24 | End: 2017-04-24 | Stop reason: HOSPADM

## 2017-04-24 RX ORDER — AMITRIPTYLINE HYDROCHLORIDE 25 MG/1
50 TABLET, FILM COATED ORAL NIGHTLY
Status: DISCONTINUED | OUTPATIENT
Start: 2017-04-24 | End: 2017-04-24 | Stop reason: HOSPADM

## 2017-04-24 RX ADMIN — NITROGLYCERIN 0.4 MG: 0.4 TABLET SUBLINGUAL at 03:51

## 2017-04-24 RX ADMIN — NITROGLYCERIN 0.4 MG: 0.4 TABLET SUBLINGUAL at 09:31

## 2017-04-24 RX ADMIN — MORPHINE SULFATE 2 MG: 2 INJECTION, SOLUTION INTRAMUSCULAR; INTRAVENOUS at 09:38

## 2017-04-24 NOTE — DISCHARGE SUMMARY
Date of Admission: 4/23/2017  Date of Discharge: 4/24/2017    Admission Diagnosis:  1. Chest pain    Discharge Diagnosis  1. Musculoskeletal chest pain/atypical chest pain    Consults: None    Procedures: None    HPI: 61-year-old white male with a history of coronary artery disease, status post coronary artery bypass grafting, hypertension, hyperlipidemia, type II diabetes mellitus, recent hospitalization for acute pancreatitis, obstructive sleep apnea, morbid obesity who presents with chest discomfort. The patient states that yesterday he developed substernal left-sided discomfort that he describes as sharp, constant, severe, nonradiating, no associated signs or symptoms. The pain persisted and the patient came to the emergency department for further evaluation. The pain is still present at this time albeit somewhat decreased in severity. The patient denies any recent other types of chest discomfort. He was recently hospitalized with pancreatitis but had abdominal pain that time. He has mild chronic shortness of breath, dyspnea on exertion, lower extremity edema which are unchanged. He denies orthopnea or PND. The patient denies any nausea or vomiting. He also denies any fevers.    Hospital Course: The patient was admitted to the emergency department and observed overnight.  The patient ruled out for myocardial infarction with serial cardiac biomarkers which remained negative.  On evaluation, the patient had reproducible chest discomfort that was thought to represent musculoskeletal chest pain.  The patient denies recent trauma to his chest however his clinical evaluation is consistent with muscular skeletal chest pain and not consistent with cardiac ischemia.  This was relayed to the patient and he was relieved and requested discharge if no further cardiac workup or other invasive workup was going to be indicated.  I agreed that this was reasonable to discharge the patient home.    Discharge Medications:        Your medication list      START taking these medications       Instructions Last Dose Given Next Dose Due    pantoprazole 40 MG EC tablet   Commonly known as:  PROTONIX        Take 1 tablet by mouth Daily.           CONTINUE taking these medications       Instructions Last Dose Given Next Dose Due    allopurinol 100 MG tablet   Commonly known as:  ZYLOPRIM              amitriptyline 50 MG tablet   Commonly known as:  ELAVIL              aspirin 81 MG EC tablet              bumetanide 2 MG tablet   Commonly known as:  BUMEX              BYSTOLIC 5 MG tablet   Generic drug:  nebivolol              CRESTOR 40 MG tablet   Generic drug:  rosuvastatin              escitalopram 10 MG tablet   Commonly known as:  LEXAPRO              insulin regular 500 UNIT/ML CONCENTRATED injection   Commonly known as:  humuLIN R              irbesartan 300 MG tablet   Commonly known as:  AVAPRO              isosorbide mononitrate 30 MG 24 hr tablet   Commonly known as:  IMDUR              LINZESS 290 MCG capsule capsule   Generic drug:  linaclotide              metOLazone 2.5 MG tablet   Commonly known as:  ZAROXOLYN              MOVANTIK 25 MG tablet   Generic drug:  Naloxegol Oxalate              ondansetron 4 MG tablet   Commonly known as:  ZOFRAN              oxyCODONE-acetaminophen  MG per tablet   Commonly known as:  PERCOCET              traZODone 50 MG tablet   Commonly known as:  DESYREL                   Where to Get Your Medications      These medications were sent to [facility]/pharmacy #[id number] - [address] [phone]  - [phone] [address] [address]     Phone:  [phone]    • pantoprazole 40 MG EC tablet           Discharge Instructions:  The patient was instructed about medication changes including the addition of Protonix 40 mg daily.  Patient is also instructed to call or return if symptoms worsen.    Follow-up care: PCP - Dr. Shetty in 1 week    Disposition: Home with  family    Diet: Cardiac, diabetic

## 2017-04-24 NOTE — PROGRESS NOTES
Discharge Planning Assessment  [facility]     Patient Name: Erick Luong  MRN: [id number]  Today's Date: 4/24/2017    Admit Date: 4/23/2017          Discharge Needs Assessment       04/24/17 0918    Living Environment    Lives With (P)  spouse    Living Arrangements (P)  house    Able to Return to Prior Living Arrangements (P)  yes    Discharge Needs Assessment    Concerns To Be Addressed (P)  no discharge needs identified    Readmission Within The Last 30 Days (P)  previous discharge plan unsuccessful    Anticipated Changes Related to Illness (P)  none    Equipment Currently Used at Home (P)  none    Equipment Needed After Discharge (P)  none    Transportation Available (P)  car;family or friend will provide    Discharge Disposition (P)  home or self-care      04/24/17 0856    Living Environment    Lives With (P)  spouse    Living Arrangements (P)  house    Able to Return to Prior Living Arrangements (P)  yes    Discharge Needs Assessment    Concerns To Be Addressed (P)  no discharge needs identified            Discharge Plan       04/24/17 0918    Case Management/Social Work Plan    Plan (P)  Pt plans to dc home with spouse. Pt has no known dc needs at this time. Sw will continue to follow.     Patient/Family In Agreement With Plan (P)  yes        Discharge Placement     No information found                Demographic Summary     None            Functional Status     None            Psychosocial     None            Abuse/Neglect     None            Legal     None            Substance Abuse     None            Patient Forms     None          Meera Melchor

## 2017-04-24 NOTE — PLAN OF CARE
Problem: Patient Care Overview (Adult)  Goal: Plan of Care Review  Outcome: Ongoing (interventions implemented as appropriate)    04/24/17 0452   Coping/Psychosocial Response Interventions   Plan Of Care Reviewed With patient   Patient Care Overview   Progress no change   Outcome Evaluation   Outcome Summary/Follow up Plan Pt continues to complain of pain; pt seems to be more concerned with his neck pain than chest pain, although he has complained about chest pain throughout the shift. Pt has slept most of the shift, but when he has been awakened by staff, he has complained of pain. Pt has fallen asleep during assessments throughout the shift. SL NTG given x 1 - little relief. SBP dropped <100, so unable to give subsequent doses. Otherwise, VSS. No change on tele monitoring.         Problem: Acute Coronary Syndrome (ACS) (Adult)  Goal: Signs and Symptoms of Listed Potential Problems Will be Absent or Manageable (Acute Coronary Syndrome)  Outcome: Ongoing (interventions implemented as appropriate)    Problem: Pain, Chronic (Adult)  Goal: Identify Related Risk Factors and Signs and Symptoms  Outcome: Outcome(s) achieved Date Met:  04/24/17  Goal: Acceptable Pain Control/Comfort Level  Outcome: Ongoing (interventions implemented as appropriate)

## 2017-04-24 NOTE — ED NOTES
"Patient's wife approached the nurse's station and stated, \"He is still having chest pain.\" Dr. Duncan was notified. No new orders were received at this time.      Shiloh Valladares RN  04/23/17 8905    "

## 2017-04-24 NOTE — ED PROVIDER NOTES
Subjective   HPI Comments: Impression presents tonight for shortness of breath and chest pain.  Patient reports that this started about 9 AM this morning.  Patient's wife accompanies him and states that she waited till she got home from work to let her know that he had been having chest pain all day.  Patient is a extremely morbidly obese gentleman with a protracted history of coronary artery disease very poorly controlled diabetes mellitus type II with most recent hemoglobin A1c being over 9.  Patient describes the chest pain as pressure and sharp and really bad.  Patient is resting comfortably at this time in no acute distress.  He states that it is all over his chest and does not radiate this time.  He describes his shortness of breath as just simply shortness of breath.  He states that nothing makes it worse and nothing makes it better.  He had no treatments prior to arrival.  He did have his normal 81 mg of aspirin this morning.  He states that he is swollen all over.      History provided by:  Patient and spouse   used: No        Review of Systems   Constitutional: Negative for activity change, chills, fatigue and fever.   HENT: Negative for congestion and dental problem.    Eyes: Negative for pain, discharge and itching.   Respiratory: Negative for apnea and chest tightness.    Cardiovascular: Negative for chest pain and palpitations.   Gastrointestinal: Negative for abdominal pain, diarrhea, nausea and vomiting.   Endocrine: Negative for polydipsia and polyuria.   Genitourinary: Negative for difficulty urinating and dysuria.   Musculoskeletal: Negative for arthralgias, neck pain and neck stiffness.   Neurological: Negative for dizziness and headaches.   Hematological: Negative for adenopathy. Does not bruise/bleed easily.   Psychiatric/Behavioral: Negative for agitation and behavioral problems.   All other systems reviewed and are negative.      Past Medical History:   Diagnosis Date   •  Arthritis    • CHF (congestive heart failure)    • Coronary artery disease    • Diabetes mellitus    • Hypertension    • Renal disorder    • Sleep apnea with use of continuous positive airway pressure (CPAP)        Allergies   Allergen Reactions   • Bactrim [Sulfamethoxazole-Trimethoprim]        Past Surgical History:   Procedure Laterality Date   • ABDOMINAL SURGERY     • APPENDECTOMY     • CARDIAC SURGERY     • CATARACT EXTRACTION     • CERVICAL SPINE SURGERY     • COLONOSCOPY N/A 1/31/2017    Procedure: COLONOSCOPY WITH ANESTHESIA;  Surgeon: Cristopher Hannah MD;  Location: [facility] ENDOSCOPY;  Service:    • COLONOSCOPY W/ POLYPECTOMY  03/04/2014    Hyperplastic polyp   • ENDOSCOPY  04/13/2011    Gastritis with hemorrhage   • INCISION AND DRAINAGE OF WOUND Left 09/2007    spider bite       Family History   Problem Relation Age of Onset   • Colon cancer Father    • Heart disease Father    • Colon cancer Sister    • Alzheimer's disease Mother    • Coronary artery disease Sister    • Coronary artery disease Sister        Social History     Social History   • Marital status:      Spouse name: N/A   • Number of children: N/A   • Years of education: N/A     Social History Main Topics   • Smoking status: Former Smoker   • Smokeless tobacco: Never Used      Comment: smoked in highschool   • Alcohol use No   • Drug use: No   • Sexual activity: Defer     Other Topics Concern   • None     Social History Narrative       Lab Results (last 24 hours)     ** No results found for the last 24 hours. **          Objective   Physical Exam   Constitutional: He is oriented to person, place, and time. He appears well-developed and well-nourished.   HENT:   Head: Normocephalic and atraumatic.   Eyes: Conjunctivae and EOM are normal. Pupils are equal, round, and reactive to light.   Neck: Normal range of motion. Neck supple.   Cardiovascular: Normal rate and regular rhythm.    Pulmonary/Chest: Effort normal and breath sounds normal.  "No respiratory distress. He has no wheezes. He has no rales. He exhibits no tenderness.   Abdominal: Soft. Bowel sounds are normal.   Markedly obese abdomen that is distended.  No tenderness noted to abdomen.  Exam extremely limited by body habitus.   Musculoskeletal: He exhibits no tenderness or deformity.   Neurological: He is alert and oriented to person, place, and time.   Skin: Skin is warm and dry.   Nursing note and vitals reviewed.      Procedures         XR Chest 1 View    (Results Pending)       /55  Pulse 70  Temp 98.5 °F (36.9 °C) (Temporal Artery )   Resp 21  Ht 72\" (182.9 cm)  Wt (!) 350 lb (159 kg)  SpO2 96%  BMI 47.47 kg/m2    ED Course    ED Course       Medications   Morphine sulfate (PF) injection 2 mg (not administered)   ondansetron (ZOFRAN) injection 4 mg (not administered)   aspirin chewable tablet 243 mg (243 mg Oral Given 4/23/17 2010)            MDM  Number of Diagnoses or Management Options  Chest pain, unspecified type: new and requires workup     Amount and/or Complexity of Data Reviewed  Clinical lab tests: ordered and reviewed  Tests in the radiology section of CPT®: ordered and reviewed  Tests in the medicine section of CPT®: ordered and reviewed        Final diagnoses:   Chest pain, unspecified type          Cary Duncan DO  04/23/17 2243    "

## 2017-04-24 NOTE — PLAN OF CARE
Problem: Patient Care Overview (Adult)  Goal: Adult Individualization and Mutuality  Outcome: Ongoing (interventions implemented as appropriate)    04/24/17 1045   Mutuality/Individual Preferences   What Information Would Help Us Give You More Personalized Care? pt has history of CHF. Goal: no fluid overload.

## 2017-04-24 NOTE — H&P
Chief Complaint   Patient presents with   • Chest Pain   • Shortness of Breath     HPI: This is a 61-year-old white male with a history of coronary artery disease, status post coronary artery bypass grafting, hypertension, hyperlipidemia, type II diabetes mellitus, recent hospitalization for acute pancreatitis, obstructive sleep apnea, morbid obesity who presents with chest discomfort.  The patient states that yesterday he developed substernal left-sided discomfort that he describes as sharp, constant, severe, nonradiating, no associated signs or symptoms.  The pain persisted and the patient came to the emergency department for further evaluation.  The pain is still present at this time albeit somewhat decreased in severity.  The patient denies any recent other types of chest discomfort.  He was recently hospitalized with pancreatitis but had abdominal pain that time.  He has mild chronic shortness of breath, dyspnea on exertion, lower extremity edema which are unchanged.  He denies orthopnea or PND.  The patient denies any nausea or vomiting.  He also denies any fevers.    Past Medical History:   Diagnosis Date   • Arthritis    • CHF (congestive heart failure)    • Coronary artery disease    • Diabetes mellitus    • Hyperlipidemia    • Hypertension    • Pancreatitis    • Renal disorder    • Sleep apnea with use of continuous positive airway pressure (CPAP)      Past Surgical History:   Procedure Laterality Date   • ABDOMINAL SURGERY     • APPENDECTOMY     • CARDIAC SURGERY     • CATARACT EXTRACTION     • CERVICAL SPINE SURGERY     • COLONOSCOPY N/A 1/31/2017    Procedure: COLONOSCOPY WITH ANESTHESIA;  Surgeon: Cristopher Hannah MD;  Location: [facility] ENDOSCOPY;  Service:    • COLONOSCOPY W/ POLYPECTOMY  03/04/2014    Hyperplastic polyp   • ENDOSCOPY  04/13/2011    Gastritis with hemorrhage   • INCISION AND DRAINAGE OF WOUND Left 09/2007    spider bite     No current facility-administered medications on file prior to  encounter.      Current Outpatient Prescriptions on File Prior to Encounter   Medication Sig Dispense Refill   • allopurinol (ZYLOPRIM) 100 MG tablet Take 100 mg by mouth Daily.     • aspirin 81 MG EC tablet Take 81 mg by mouth Daily.     • BYSTOLIC 5 MG tablet TAKE 1 TABLET BY MOUTH DAILY  3   • CRESTOR 40 MG tablet TAKE 1 TABLET EVERY DAY  6   • escitalopram (LEXAPRO) 10 MG tablet TAKE 1 TABLET DAILY  5   • insulin regular (humuLIN R) 500 UNIT/ML CONCENTRATED injection Inject 100 Units under the skin 3 (Three) Times a Day Before Meals. Packaging states 100 units with regular meals; 120 units with large meals or 360 units daily     • irbesartan (AVAPRO) 300 MG tablet TAKE 1 TABLET EVERY DAY  11   • metOLazone (ZAROXOLYN) 2.5 MG tablet Take 2.5 mg by mouth Daily.     • oxyCODONE-acetaminophen (PERCOCET)  MG per tablet TAKE 1 TABLET BY MOUTH TWICE A DAY AS NEEDED  0   • traZODone (DESYREL) 50 MG tablet TAKE 1 TABLET BY MOUTH AT BEDTIME  3   • bumetanide (BUMEX) 2 MG tablet TAKE 1.5 TABLET EVERY DAY  4   • LINZESS 290 MCG capsule capsule TAKE 1 CAPSULE EVERY DAY  3   • ondansetron (ZOFRAN) 4 MG tablet TAKE 1 TABLET BY MOUTH EVERY 4 HOURS AS NEEDED FOR NAUSEA AND VOMITING  0     Allergies   Allergen Reactions   • Bactrim [Sulfamethoxazole-Trimethoprim]      Social History   Substance Use Topics   • Smoking status: Former Smoker   • Smokeless tobacco: Never Used      Comment: smoked in highschool   • Alcohol use No     Family History   Problem Relation Age of Onset   • Colon cancer Father    • Heart disease Father    • Colon cancer Sister    • Alzheimer's disease Mother    • Coronary artery disease Sister    • Coronary artery disease Sister      Review of Systems   Constitution: Positive for weakness and malaise/fatigue. Negative for chills, fever, night sweats and weight loss.   HENT: Negative for congestion, headaches and hearing loss.    Eyes: Negative for blurred vision and pain.   Cardiovascular: Positive for  "chest pain, dyspnea on exertion (mild, chronic and unchanged) and leg swelling (chronic and uncahnged). Negative for claudication, irregular heartbeat, orthopnea, palpitations, paroxysmal nocturnal dyspnea and syncope.   Respiratory: Positive for shortness of breath. Negative for cough, hemoptysis and wheezing.    Endocrine: Negative for cold intolerance, heat intolerance, polydipsia and polyuria.   Hematologic/Lymphatic: Negative for adenopathy and bleeding problem. Does not bruise/bleed easily.   Skin: Negative for color change, poor wound healing and rash.   Musculoskeletal: Negative for arthritis, back pain, joint pain, joint swelling, myalgias and neck pain.   Gastrointestinal: Positive for abdominal pain (improved recently). Negative for change in bowel habit, constipation, diarrhea, heartburn, hematochezia, melena, nausea and vomiting.   Genitourinary: Negative for bladder incontinence, dysuria, frequency, hematuria and nocturia.   Neurological: Negative for dizziness, focal weakness, light-headedness, loss of balance, numbness and seizures.   Psychiatric/Behavioral: Negative for altered mental status, memory loss and substance abuse.   Allergic/Immunologic: Negative for hives and persistent infections.     Physical Exam    /50  Pulse 70  Temp 98.2 °F (36.8 °C) (Temporal Artery )   Resp 18  Ht 70\" (177.8 cm)  Wt (!) 348 lb 4.8 oz (158 kg)  SpO2 98%  BMI 49.98 kg/m2    Temp:  [97.6 °F (36.4 °C)-98.5 °F (36.9 °C)] 98.2 °F (36.8 °C)  Heart Rate:  [64-75] 70  Resp:  [16-22] 18  BP: ()/(45-89) 103/50    Physical Exam   Constitutional: He is oriented to person, place, and time. Vital signs are normal. He appears well-developed and well-nourished. He is cooperative.  Non-toxic appearance. No distress.   HENT:   Head: Normocephalic and atraumatic.   Right Ear: External ear normal.   Left Ear: External ear normal.   Nose: Nose normal.   Mouth/Throat: Oropharynx is clear and moist. No oropharyngeal " exudate.   Eyes: Conjunctivae, EOM and lids are normal. Pupils are equal, round, and reactive to light. No scleral icterus.   Neck: Trachea normal and normal range of motion. Neck supple. No hepatojugular reflux and no JVD present. Carotid bruit is not present. No tracheal deviation and no edema present. No thyroid mass and no thyromegaly present.   Cardiovascular: Normal rate, regular rhythm, S1 normal, S2 normal, normal heart sounds and intact distal pulses.   No extrasystoles are present. Exam reveals no gallop and no friction rub.    No murmur heard.  Pulses:       Radial pulses are 2+ on the right side, and 2+ on the left side.        Femoral pulses are 2+ on the right side, and 2+ on the left side.       Dorsalis pedis pulses are 2+ on the right side, and 2+ on the left side.        Posterior tibial pulses are 2+ on the right side, and 2+ on the left side.   Pulmonary/Chest: Effort normal and breath sounds normal. No respiratory distress. He has no decreased breath sounds. He has no wheezes. He has no rhonchi. He has no rales. He exhibits tenderness (tenderness to light palpation over sternal area). He exhibits no mass, no bony tenderness, no laceration, no crepitus, no deformity and no swelling.   Abdominal: Soft. Normal appearance and bowel sounds are normal. He exhibits no distension, no abdominal bruit and no mass. There is no hepatosplenomegaly. There is no tenderness. There is no rebound and no guarding.   Morbidly obese   Musculoskeletal: Normal range of motion. He exhibits no edema, tenderness or deformity.   Lymphadenopathy:     He has no cervical adenopathy.   Neurological: He is alert and oriented to person, place, and time. No cranial nerve deficit. He exhibits normal muscle tone.   Skin: Skin is warm, dry and intact. No rash noted. He is not diaphoretic. No erythema. No pallor.   Psychiatric: His behavior is normal. Judgment and thought content normal. He exhibits a depressed mood.   Vitals  reviewed.    Diagnostic Data:    Troponin has been negative x 3  ECG: NSR, first-degree AV block and interventricular conduction delay, no acute changes  CXR: No acute cardiopulmonary process    Lab Results   Component Value Date    WBC 11.86 (H) 04/23/2017    HGB 11.5 (L) 04/23/2017    HCT 34.1 (L) 04/23/2017    MCV 87.9 04/23/2017     04/23/2017     Lab Results   Component Value Date    GLUCOSE 310 (H) 04/23/2017    CALCIUM 8.8 04/23/2017     (L) 04/23/2017    K 5.1 04/23/2017    CO2 27.0 04/23/2017    CL 94 (L) 04/23/2017    BUN 65 (H) 04/23/2017    CREATININE 2.46 (H) 04/23/2017    EGFRIFAFRI  09/19/2016      Comment:      <15 Indicative of kidney failure.    EGFRIFNONA 27 (L) 04/23/2017    BCR 26.4 (H) 04/23/2017    ANIONGAP 12.0 04/23/2017     Amylase: 79, lipase: 76    D-dimer: 0.37  Liver function test within normal limits  LDL cholesterol 64, HDL cholesterol 28, triglycerides 238    ASSESSMENT/PLAN:    1.  Atypical chest pain, suspect musculoskeletal in origin as the patient has reproducible symptoms on examination.  He has ruled out for myocardial infarction with serial negative markers.  He says that he has had heartburn and acid reflux in the past but no symptoms were somewhat different than his current symptoms.  In addition, he recently had pancreatitis but notes different location for his discomfort at that time which was located in his abdomen not in his chest.  After thoroughly examining and evaluated the patient, felt like this is strictly musculoskeletal in origin.  Unfortunately, the patient is not a good candidate for anti-inflammatory therapies as he has chronic kidney disease that fluctuates widely.  I would be hesitant to give him any anti-inflammatories which may worsen his kidney function.  In addition, the patient has had recent labile blood glucose readings after changing insulin per his insurance, therefore I am hesitant to place him on steroids or any other type of  anti-inflammatories.  Think that it is reasonable to give him a prescription for Protonix as an outpatient in the event that this might be some sort of heartburn or acid reflux.  In addition, the patient has pain medications at home which are narcotic which I told him he could take as needed for discomfort.  However, no further inpatient cardiac workup seems needed at this time.    2.  History of native multivessel coronary artery disease status post coronary artery bypass grafting    3.  Shortness of breath: This is a chronic problem for the patient and is unchanged according to his report.  This is thought likely multifactorial in this patient with morbid obesity, hypertension, poor exercise tolerance and deconditioning.  Chest x-ray shows no acute process, no indication or risk factors for pulmonary embolism other than obesity.  Does not seem that, with chronic symptoms that are unchanged, any further workup is indicated at this time.    4.  Essential hypertension: Continue home medications without any changes recommended at the current time    5.  Mixed hyperlipidemia: The patient's lipid panel as noted above.  His LDL cholesterol is well-controlled.  Continue current medical therapy.    6. Stage 3 chronic kidney disease  7. Anemia of chronic disease    The patient is requesting discharge home with no further workup is undertaken today.  I do not think that any further cardiac workup is indicated in order think the patient requires any other type of workup given his musculoskeletal chest pain.  He seems relieved with this news and is ready to go home.

## 2017-05-03 ENCOUNTER — OFFICE VISIT (OUTPATIENT)
Dept: GASTROENTEROLOGY | Facility: CLINIC | Age: 62
End: 2017-05-03

## 2017-05-03 VITALS
HEIGHT: 72 IN | DIASTOLIC BLOOD PRESSURE: 80 MMHG | OXYGEN SATURATION: 97 % | BODY MASS INDEX: 42.66 KG/M2 | HEART RATE: 78 BPM | WEIGHT: 315 LBS | SYSTOLIC BLOOD PRESSURE: 138 MMHG

## 2017-05-03 DIAGNOSIS — E66.9 OBESITY, UNSPECIFIED OBESITY SEVERITY, UNSPECIFIED OBESITY TYPE: ICD-10-CM

## 2017-05-03 DIAGNOSIS — E11.9 CONTROLLED TYPE 2 DIABETES MELLITUS WITHOUT COMPLICATION, WITH LONG-TERM CURRENT USE OF INSULIN (HCC): ICD-10-CM

## 2017-05-03 DIAGNOSIS — Z79.4 CONTROLLED TYPE 2 DIABETES MELLITUS WITHOUT COMPLICATION, WITH LONG-TERM CURRENT USE OF INSULIN (HCC): ICD-10-CM

## 2017-05-03 DIAGNOSIS — I10 HTN (HYPERTENSION), BENIGN: ICD-10-CM

## 2017-05-03 DIAGNOSIS — K85.00 IDIOPATHIC ACUTE PANCREATITIS WITHOUT INFECTION OR NECROSIS: ICD-10-CM

## 2017-05-03 DIAGNOSIS — R10.13 EPIGASTRIC PAIN: Primary | ICD-10-CM

## 2017-05-03 PROCEDURE — 99214 OFFICE O/P EST MOD 30 MIN: CPT | Performed by: CLINICAL NURSE SPECIALIST

## 2017-05-04 ENCOUNTER — ANESTHESIA EVENT (OUTPATIENT)
Dept: GASTROENTEROLOGY | Facility: HOSPITAL | Age: 62
End: 2017-05-04

## 2017-05-05 ENCOUNTER — HOSPITAL ENCOUNTER (OUTPATIENT)
Facility: HOSPITAL | Age: 62
Setting detail: HOSPITAL OUTPATIENT SURGERY
Discharge: HOME | End: 2017-05-05
Attending: INTERNAL MEDICINE | Admitting: INTERNAL MEDICINE

## 2017-05-05 ENCOUNTER — ANESTHESIA (OUTPATIENT)
Dept: GASTROENTEROLOGY | Facility: HOSPITAL | Age: 62
End: 2017-05-05

## 2017-05-05 VITALS
BODY MASS INDEX: 42.66 KG/M2 | RESPIRATION RATE: 18 BRPM | DIASTOLIC BLOOD PRESSURE: 55 MMHG | HEART RATE: 67 BPM | WEIGHT: 315 LBS | HEIGHT: 72 IN | SYSTOLIC BLOOD PRESSURE: 117 MMHG | OXYGEN SATURATION: 97 % | TEMPERATURE: 98 F

## 2017-05-05 DIAGNOSIS — R10.13 EPIGASTRIC PAIN: ICD-10-CM

## 2017-05-05 LAB — GLUCOSE BLDC GLUCOMTR-MCNC: 146 MG/DL (ref 70–130)

## 2017-05-05 PROCEDURE — 82962 GLUCOSE BLOOD TEST: CPT

## 2017-05-05 PROCEDURE — 43235 EGD DIAGNOSTIC BRUSH WASH: CPT | Performed by: INTERNAL MEDICINE

## 2017-05-05 PROCEDURE — 25010000002 PROPOFOL 10 MG/ML EMULSION: Performed by: NURSE ANESTHETIST, CERTIFIED REGISTERED

## 2017-05-05 RX ORDER — SODIUM CHLORIDE 0.9 % (FLUSH) 0.9 %
1-10 SYRINGE (ML) INJECTION AS NEEDED
Status: DISCONTINUED | OUTPATIENT
Start: 2017-05-05 | End: 2017-05-05 | Stop reason: HOSPADM

## 2017-05-05 RX ORDER — SODIUM CHLORIDE 9 MG/ML
100 INJECTION, SOLUTION INTRAVENOUS CONTINUOUS
Status: DISCONTINUED | OUTPATIENT
Start: 2017-05-05 | End: 2017-05-05 | Stop reason: HOSPADM

## 2017-05-05 RX ORDER — LIDOCAINE HYDROCHLORIDE 20 MG/ML
INJECTION, SOLUTION INFILTRATION; PERINEURAL AS NEEDED
Status: DISCONTINUED | OUTPATIENT
Start: 2017-05-05 | End: 2017-05-05 | Stop reason: SURG

## 2017-05-05 RX ORDER — PROPOFOL 10 MG/ML
VIAL (ML) INTRAVENOUS AS NEEDED
Status: DISCONTINUED | OUTPATIENT
Start: 2017-05-05 | End: 2017-05-05 | Stop reason: SURG

## 2017-05-05 RX ADMIN — SODIUM CHLORIDE 100 ML/HR: 9 INJECTION, SOLUTION INTRAVENOUS at 07:37

## 2017-05-05 RX ADMIN — PROPOFOL 200 MG: 10 INJECTION, EMULSION INTRAVENOUS at 08:07

## 2017-05-05 RX ADMIN — LIDOCAINE HYDROCHLORIDE 100 MG: 20 INJECTION, SOLUTION INFILTRATION; PERINEURAL at 08:07

## 2017-05-22 ENCOUNTER — HOSPITAL ENCOUNTER (OUTPATIENT)
Facility: HOSPITAL | Age: 62
Setting detail: OBSERVATION
Discharge: HOME | End: 2017-05-23
Attending: EMERGENCY MEDICINE | Admitting: FAMILY MEDICINE

## 2017-05-22 DIAGNOSIS — R07.9 CHEST PAIN IN ADULT: Primary | ICD-10-CM

## 2017-05-22 DIAGNOSIS — E11.8 TYPE 2 DIABETES MELLITUS WITH COMPLICATION, UNSPECIFIED LONG TERM INSULIN USE STATUS: ICD-10-CM

## 2017-05-22 DIAGNOSIS — N18.9 CHRONIC RENAL INSUFFICIENCY, UNSPECIFIED STAGE: ICD-10-CM

## 2017-05-22 PROCEDURE — 93005 ELECTROCARDIOGRAM TRACING: CPT

## 2017-05-22 PROCEDURE — 99285 EMERGENCY DEPT VISIT HI MDM: CPT

## 2017-05-22 RX ORDER — SUCRALFATE 1 G/1
1 TABLET ORAL 4 TIMES DAILY
COMMUNITY
End: 2017-05-23 | Stop reason: HOSPADM

## 2017-05-23 ENCOUNTER — APPOINTMENT (OUTPATIENT)
Dept: CARDIOLOGY | Facility: HOSPITAL | Age: 62
End: 2017-05-23

## 2017-05-23 VITALS
SYSTOLIC BLOOD PRESSURE: 130 MMHG | DIASTOLIC BLOOD PRESSURE: 70 MMHG | RESPIRATION RATE: 18 BRPM | TEMPERATURE: 97.8 F | HEIGHT: 72 IN | HEART RATE: 78 BPM | BODY MASS INDEX: 42.66 KG/M2 | OXYGEN SATURATION: 98 % | WEIGHT: 315 LBS

## 2017-05-23 PROBLEM — R07.9 CHEST PAIN IN ADULT: Status: ACTIVE | Noted: 2017-05-23

## 2017-05-23 LAB
ALBUMIN SERPL-MCNC: 4.1 G/DL (ref 3.5–5)
ALBUMIN/GLOB SERPL: 1.4 G/DL (ref 1.1–2.5)
ALP SERPL-CCNC: 80 U/L (ref 24–120)
ALT SERPL W P-5'-P-CCNC: 23 U/L (ref 0–54)
AMYLASE SERPL-CCNC: 73 U/L (ref 30–110)
ANION GAP SERPL CALCULATED.3IONS-SCNC: 15 MMOL/L (ref 4–13)
APTT PPP: 24.9 SECONDS (ref 24.1–34.8)
ARTICHOKE IGE QN: 69 MG/DL (ref 0–99)
AST SERPL-CCNC: 33 U/L (ref 7–45)
BASOPHILS # BLD AUTO: 0.06 10*3/MM3 (ref 0–0.2)
BASOPHILS NFR BLD AUTO: 0.6 % (ref 0–2)
BH CV STRESS BP STAGE 1: NORMAL
BH CV STRESS BP STAGE 3: NORMAL
BH CV STRESS BP STAGE 4: NORMAL
BH CV STRESS DOSE DOBUTAMINE STAGE 1: 10
BH CV STRESS DOSE DOBUTAMINE STAGE 2: 20
BH CV STRESS DOSE DOBUTAMINE STAGE 3: 30
BH CV STRESS DOSE DOBUTAMINE STAGE 4: 40
BH CV STRESS DURATION MIN STAGE 1: 3
BH CV STRESS DURATION MIN STAGE 2: 3
BH CV STRESS DURATION MIN STAGE 3: 3
BH CV STRESS DURATION MIN STAGE 4: 3
BH CV STRESS DURATION SEC STAGE 1: 0
BH CV STRESS DURATION SEC STAGE 2: 0
BH CV STRESS DURATION SEC STAGE 3: 0
BH CV STRESS DURATION SEC STAGE 4: 26
BH CV STRESS HR STAGE 1: 80
BH CV STRESS HR STAGE 2: 99
BH CV STRESS HR STAGE 3: 122
BH CV STRESS HR STAGE 4: 140
BH CV STRESS PROTOCOL 1: NORMAL
BH CV STRESS RECOVERY BP: NORMAL MMHG
BH CV STRESS RECOVERY HR: 100 BPM
BH CV STRESS STAGE 1: 1
BH CV STRESS STAGE 2: 2
BH CV STRESS STAGE 3: 3
BH CV STRESS STAGE 4: 4
BILIRUB SERPL-MCNC: 0.6 MG/DL (ref 0.1–1)
BILIRUB UR QL STRIP: NEGATIVE
BUN BLD-MCNC: 58 MG/DL (ref 5–21)
BUN/CREAT SERPL: 28.7 (ref 7–25)
CALCIUM SPEC-SCNC: 9.7 MG/DL (ref 8.4–10.4)
CHLORIDE SERPL-SCNC: 96 MMOL/L (ref 98–110)
CHOLEST SERPL-MCNC: 146 MG/DL (ref 130–200)
CLARITY UR: CLEAR
CO2 SERPL-SCNC: 22 MMOL/L (ref 24–31)
COLOR UR: YELLOW
CREAT BLD-MCNC: 2.02 MG/DL (ref 0.5–1.4)
D DIMER PPP FEU-MCNC: 0.56 MG/L (FEU) (ref 0–0.5)
DEPRECATED RDW RBC AUTO: 42.4 FL (ref 40–54)
EOSINOPHIL # BLD AUTO: 0.59 10*3/MM3 (ref 0–0.7)
EOSINOPHIL NFR BLD AUTO: 6 % (ref 0–4)
ERYTHROCYTE [DISTWIDTH] IN BLOOD BY AUTOMATED COUNT: 13.6 % (ref 12–15)
GFR SERPL CREATININE-BSD FRML MDRD: 34 ML/MIN/1.73
GLOBULIN UR ELPH-MCNC: 2.9 GM/DL
GLUCOSE BLD-MCNC: 338 MG/DL (ref 70–100)
GLUCOSE BLDC GLUCOMTR-MCNC: 105 MG/DL (ref 70–130)
GLUCOSE BLDC GLUCOMTR-MCNC: 201 MG/DL (ref 70–130)
GLUCOSE UR STRIP-MCNC: ABNORMAL MG/DL
HBA1C MFR BLD: 9.4 %
HBA1C MFR BLD: 9.5 %
HCT VFR BLD AUTO: 35.4 % (ref 40–52)
HDLC SERPL-MCNC: 39 MG/DL
HGB BLD-MCNC: 12.3 G/DL (ref 14–18)
HGB UR QL STRIP.AUTO: NEGATIVE
IMM GRANULOCYTES # BLD: 0.04 10*3/MM3 (ref 0–0.03)
IMM GRANULOCYTES NFR BLD: 0.4 % (ref 0–5)
INR PPP: 0.85 (ref 0.91–1.09)
KETONES UR QL STRIP: NEGATIVE
LDLC/HDLC SERPL: 1.9 {RATIO}
LEUKOCYTE ESTERASE UR QL STRIP.AUTO: NEGATIVE
LIPASE SERPL-CCNC: 42 U/L (ref 23–203)
LYMPHOCYTES # BLD AUTO: 1.93 10*3/MM3 (ref 0.72–4.86)
LYMPHOCYTES NFR BLD AUTO: 19.6 % (ref 15–45)
MAGNESIUM SERPL-MCNC: 1.9 MG/DL (ref 1.4–2.2)
MCH RBC QN AUTO: 29.8 PG (ref 28–32)
MCHC RBC AUTO-ENTMCNC: 34.7 G/DL (ref 33–36)
MCV RBC AUTO: 85.7 FL (ref 82–95)
MONOCYTES # BLD AUTO: 1.24 10*3/MM3 (ref 0.19–1.3)
MONOCYTES NFR BLD AUTO: 12.6 % (ref 4–12)
NEUTROPHILS # BLD AUTO: 6.01 10*3/MM3 (ref 1.87–8.4)
NEUTROPHILS NFR BLD AUTO: 60.8 % (ref 39–78)
NITRITE UR QL STRIP: NEGATIVE
NT-PROBNP SERPL-MCNC: 600 PG/ML (ref 0–900)
PERCENT MAX PREDICTED HR: 87.5 %
PH UR STRIP.AUTO: <=5 [PH] (ref 5–8)
PLATELET # BLD AUTO: 222 10*3/MM3 (ref 130–400)
PMV BLD AUTO: 11.6 FL (ref 6–12)
POTASSIUM BLD-SCNC: 4.3 MMOL/L (ref 3.5–5.3)
PROT SERPL-MCNC: 7 G/DL (ref 6.3–8.7)
PROT UR QL STRIP: NEGATIVE
PROTHROMBIN TIME: 11.9 SECONDS (ref 11.9–14.6)
RBC # BLD AUTO: 4.13 10*6/MM3 (ref 4.8–5.9)
SODIUM BLD-SCNC: 133 MMOL/L (ref 135–145)
SP GR UR STRIP: 1.02 (ref 1–1.03)
STRESS BASELINE BP: NORMAL MMHG
STRESS BASELINE HR: 76 BPM
STRESS PERCENT HR: 103 %
STRESS POST PEAK BP: NORMAL MMHG
STRESS POST PEAK HR: 140 BPM
TRIGL SERPL-MCNC: 164 MG/DL (ref 0–149)
TROPONIN I SERPL-MCNC: <0.012 NG/ML (ref 0–0.03)
TSH SERPL DL<=0.05 MIU/L-ACNC: 1.27 MIU/ML (ref 0.47–4.68)
UROBILINOGEN UR QL STRIP: ABNORMAL
WBC NRBC COR # BLD: 9.87 10*3/MM3 (ref 4.8–10.8)

## 2017-05-23 PROCEDURE — 84443 ASSAY THYROID STIM HORMONE: CPT | Performed by: INTERNAL MEDICINE

## 2017-05-23 PROCEDURE — 96374 THER/PROPH/DIAG INJ IV PUSH: CPT

## 2017-05-23 PROCEDURE — 94760 N-INVAS EAR/PLS OXIMETRY 1: CPT

## 2017-05-23 PROCEDURE — 93352 ADMIN ECG CONTRAST AGENT: CPT | Performed by: INTERNAL MEDICINE

## 2017-05-23 PROCEDURE — 25010000002 MORPHINE SULFATE (PF) 2 MG/ML SOLUTION: Performed by: INTERNAL MEDICINE

## 2017-05-23 PROCEDURE — G0378 HOSPITAL OBSERVATION PER HR: HCPCS

## 2017-05-23 PROCEDURE — 80061 LIPID PANEL: CPT | Performed by: INTERNAL MEDICINE

## 2017-05-23 PROCEDURE — 94799 UNLISTED PULMONARY SVC/PX: CPT

## 2017-05-23 PROCEDURE — 96361 HYDRATE IV INFUSION ADD-ON: CPT

## 2017-05-23 PROCEDURE — 82962 GLUCOSE BLOOD TEST: CPT

## 2017-05-23 PROCEDURE — 85610 PROTHROMBIN TIME: CPT | Performed by: EMERGENCY MEDICINE

## 2017-05-23 PROCEDURE — 93017 CV STRESS TEST TRACING ONLY: CPT

## 2017-05-23 PROCEDURE — 93018 CV STRESS TEST I&R ONLY: CPT | Performed by: INTERNAL MEDICINE

## 2017-05-23 PROCEDURE — 25010000002 PERFLUTREN (DEFINITY) 8.476 MG IN SODIUM CHLORIDE 10 ML INJECTION: Performed by: FAMILY MEDICINE

## 2017-05-23 PROCEDURE — 81003 URINALYSIS AUTO W/O SCOPE: CPT | Performed by: EMERGENCY MEDICINE

## 2017-05-23 PROCEDURE — 36415 COLL VENOUS BLD VENIPUNCTURE: CPT | Performed by: INTERNAL MEDICINE

## 2017-05-23 PROCEDURE — 93010 ELECTROCARDIOGRAM REPORT: CPT | Performed by: INTERNAL MEDICINE

## 2017-05-23 PROCEDURE — 85379 FIBRIN DEGRADATION QUANT: CPT | Performed by: EMERGENCY MEDICINE

## 2017-05-23 PROCEDURE — 25010000003 DOBUTAMINE PER 250 MG: Performed by: FAMILY MEDICINE

## 2017-05-23 PROCEDURE — 83690 ASSAY OF LIPASE: CPT | Performed by: NURSE PRACTITIONER

## 2017-05-23 PROCEDURE — 84484 ASSAY OF TROPONIN QUANT: CPT | Performed by: EMERGENCY MEDICINE

## 2017-05-23 PROCEDURE — 25010000002 ENOXAPARIN PER 10 MG: Performed by: INTERNAL MEDICINE

## 2017-05-23 PROCEDURE — 82150 ASSAY OF AMYLASE: CPT | Performed by: NURSE PRACTITIONER

## 2017-05-23 PROCEDURE — 96376 TX/PRO/DX INJ SAME DRUG ADON: CPT

## 2017-05-23 PROCEDURE — 84484 ASSAY OF TROPONIN QUANT: CPT | Performed by: INTERNAL MEDICINE

## 2017-05-23 PROCEDURE — 63710000001 INSULIN LISPRO (HUMAN) PER 5 UNITS: Performed by: NURSE PRACTITIONER

## 2017-05-23 PROCEDURE — 93005 ELECTROCARDIOGRAM TRACING: CPT | Performed by: EMERGENCY MEDICINE

## 2017-05-23 PROCEDURE — 96372 THER/PROPH/DIAG INJ SC/IM: CPT

## 2017-05-23 PROCEDURE — 83036 HEMOGLOBIN GLYCOSYLATED A1C: CPT | Performed by: NURSE PRACTITIONER

## 2017-05-23 PROCEDURE — 85730 THROMBOPLASTIN TIME PARTIAL: CPT | Performed by: EMERGENCY MEDICINE

## 2017-05-23 PROCEDURE — 93350 STRESS TTE ONLY: CPT | Performed by: INTERNAL MEDICINE

## 2017-05-23 PROCEDURE — 83735 ASSAY OF MAGNESIUM: CPT | Performed by: INTERNAL MEDICINE

## 2017-05-23 PROCEDURE — 83036 HEMOGLOBIN GLYCOSYLATED A1C: CPT | Performed by: INTERNAL MEDICINE

## 2017-05-23 PROCEDURE — C8928 TTE W OR W/O FOL W/CON,STRES: HCPCS

## 2017-05-23 PROCEDURE — 80053 COMPREHEN METABOLIC PANEL: CPT | Performed by: EMERGENCY MEDICINE

## 2017-05-23 PROCEDURE — 85025 COMPLETE CBC W/AUTO DIFF WBC: CPT | Performed by: EMERGENCY MEDICINE

## 2017-05-23 PROCEDURE — 63710000001 PREDNISONE PER 1 MG: Performed by: NURSE PRACTITIONER

## 2017-05-23 PROCEDURE — 83880 ASSAY OF NATRIURETIC PEPTIDE: CPT | Performed by: EMERGENCY MEDICINE

## 2017-05-23 RX ORDER — ISOSORBIDE MONONITRATE 30 MG/1
30 TABLET, EXTENDED RELEASE ORAL DAILY
Status: DISCONTINUED | OUTPATIENT
Start: 2017-05-23 | End: 2017-05-23 | Stop reason: HOSPADM

## 2017-05-23 RX ORDER — HYDROCODONE BITARTRATE AND ACETAMINOPHEN 5; 325 MG/1; MG/1
1 TABLET ORAL EVERY 6 HOURS PRN
Status: DISCONTINUED | OUTPATIENT
Start: 2017-05-23 | End: 2017-05-23 | Stop reason: HOSPADM

## 2017-05-23 RX ORDER — METOLAZONE 2.5 MG/1
2.5 TABLET ORAL DAILY
Status: DISCONTINUED | OUTPATIENT
Start: 2017-05-23 | End: 2017-05-23 | Stop reason: HOSPADM

## 2017-05-23 RX ORDER — HYDROCODONE BITARTRATE AND ACETAMINOPHEN 5; 325 MG/1; MG/1
1 TABLET ORAL EVERY 6 HOURS PRN
Qty: 12 TABLET | Refills: 0 | Status: SHIPPED | OUTPATIENT
Start: 2017-05-23 | End: 2017-06-02

## 2017-05-23 RX ORDER — PANTOPRAZOLE SODIUM 40 MG/1
40 TABLET, DELAYED RELEASE ORAL
Status: DISCONTINUED | OUTPATIENT
Start: 2017-05-23 | End: 2017-05-23 | Stop reason: HOSPADM

## 2017-05-23 RX ORDER — MORPHINE SULFATE 2 MG/ML
2 INJECTION, SOLUTION INTRAMUSCULAR; INTRAVENOUS
Status: DISCONTINUED | OUTPATIENT
Start: 2017-05-23 | End: 2017-05-23 | Stop reason: HOSPADM

## 2017-05-23 RX ORDER — ATORVASTATIN CALCIUM 40 MG/1
80 TABLET, FILM COATED ORAL DAILY
Status: DISCONTINUED | OUTPATIENT
Start: 2017-05-23 | End: 2017-05-23 | Stop reason: HOSPADM

## 2017-05-23 RX ORDER — NICOTINE POLACRILEX 4 MG
15 LOZENGE BUCCAL
Status: DISCONTINUED | OUTPATIENT
Start: 2017-05-23 | End: 2017-05-23 | Stop reason: HOSPADM

## 2017-05-23 RX ORDER — METOCLOPRAMIDE 5 MG/1
5 TABLET ORAL
Qty: 90 TABLET | Refills: 0 | Status: SHIPPED | OUTPATIENT
Start: 2017-05-23 | End: 2018-01-19 | Stop reason: HOSPADM

## 2017-05-23 RX ORDER — DEXTROSE MONOHYDRATE 25 G/50ML
25 INJECTION, SOLUTION INTRAVENOUS
Status: DISCONTINUED | OUTPATIENT
Start: 2017-05-23 | End: 2017-05-23 | Stop reason: HOSPADM

## 2017-05-23 RX ORDER — ACETAMINOPHEN 325 MG/1
650 TABLET ORAL EVERY 6 HOURS PRN
Status: DISCONTINUED | OUTPATIENT
Start: 2017-05-23 | End: 2017-05-23 | Stop reason: HOSPADM

## 2017-05-23 RX ORDER — IRBESARTAN 150 MG/1
300 TABLET ORAL
Status: DISCONTINUED | OUTPATIENT
Start: 2017-05-23 | End: 2017-05-23 | Stop reason: HOSPADM

## 2017-05-23 RX ORDER — ASPIRIN 81 MG/1
81 TABLET ORAL DAILY
Status: DISCONTINUED | OUTPATIENT
Start: 2017-05-23 | End: 2017-05-23 | Stop reason: HOSPADM

## 2017-05-23 RX ORDER — MAGNESIUM HYDROXIDE/ALUMINUM HYDROXICE/SIMETHICONE 120; 1200; 1200 MG/30ML; MG/30ML; MG/30ML
30 SUSPENSION ORAL ONCE
Status: COMPLETED | OUTPATIENT
Start: 2017-05-23 | End: 2017-05-23

## 2017-05-23 RX ORDER — DOBUTAMINE HYDROCHLORIDE 100 MG/100ML
10-50 INJECTION INTRAVENOUS
Status: DISCONTINUED | OUTPATIENT
Start: 2017-05-23 | End: 2017-05-23 | Stop reason: HOSPADM

## 2017-05-23 RX ORDER — ONDANSETRON 2 MG/ML
4 INJECTION INTRAMUSCULAR; INTRAVENOUS EVERY 6 HOURS PRN
Status: DISCONTINUED | OUTPATIENT
Start: 2017-05-23 | End: 2017-05-23 | Stop reason: HOSPADM

## 2017-05-23 RX ORDER — SUCRALFATE 1 G/1
1 TABLET ORAL
Status: DISCONTINUED | OUTPATIENT
Start: 2017-05-23 | End: 2017-05-23 | Stop reason: HOSPADM

## 2017-05-23 RX ORDER — PREDNISONE 10 MG/1
TABLET ORAL
Qty: 26 TABLET | Refills: 0 | Status: ON HOLD | OUTPATIENT
Start: 2017-05-23 | End: 2018-01-17

## 2017-05-23 RX ORDER — ESCITALOPRAM OXALATE 10 MG/1
10 TABLET ORAL DAILY
Status: DISCONTINUED | OUTPATIENT
Start: 2017-05-23 | End: 2017-05-23 | Stop reason: HOSPADM

## 2017-05-23 RX ORDER — SODIUM CHLORIDE 0.9 % (FLUSH) 0.9 %
1-10 SYRINGE (ML) INJECTION AS NEEDED
Status: DISCONTINUED | OUTPATIENT
Start: 2017-05-23 | End: 2017-05-23 | Stop reason: HOSPADM

## 2017-05-23 RX ORDER — IPRATROPIUM BROMIDE AND ALBUTEROL SULFATE 2.5; .5 MG/3ML; MG/3ML
3 SOLUTION RESPIRATORY (INHALATION) EVERY 4 HOURS PRN
Status: DISCONTINUED | OUTPATIENT
Start: 2017-05-23 | End: 2017-05-23 | Stop reason: HOSPADM

## 2017-05-23 RX ORDER — METOCLOPRAMIDE 5 MG/1
5 TABLET ORAL
Status: DISCONTINUED | OUTPATIENT
Start: 2017-05-23 | End: 2017-05-23 | Stop reason: HOSPADM

## 2017-05-23 RX ORDER — SODIUM CHLORIDE 9 MG/ML
75 INJECTION, SOLUTION INTRAVENOUS CONTINUOUS
Status: DISCONTINUED | OUTPATIENT
Start: 2017-05-23 | End: 2017-05-23 | Stop reason: HOSPADM

## 2017-05-23 RX ORDER — ALLOPURINOL 100 MG/1
100 TABLET ORAL DAILY
Status: DISCONTINUED | OUTPATIENT
Start: 2017-05-23 | End: 2017-05-23 | Stop reason: HOSPADM

## 2017-05-23 RX ORDER — TRAZODONE HYDROCHLORIDE 50 MG/1
50 TABLET ORAL NIGHTLY PRN
Status: DISCONTINUED | OUTPATIENT
Start: 2017-05-23 | End: 2017-05-23 | Stop reason: HOSPADM

## 2017-05-23 RX ORDER — PREDNISONE 20 MG/1
40 TABLET ORAL
Status: DISCONTINUED | OUTPATIENT
Start: 2017-05-23 | End: 2017-05-23 | Stop reason: HOSPADM

## 2017-05-23 RX ORDER — NITROGLYCERIN 0.4 MG/1
0.4 TABLET SUBLINGUAL
Status: DISCONTINUED | OUTPATIENT
Start: 2017-05-23 | End: 2017-05-23 | Stop reason: HOSPADM

## 2017-05-23 RX ADMIN — SODIUM CHLORIDE 75 ML/HR: 9 INJECTION, SOLUTION INTRAVENOUS at 09:19

## 2017-05-23 RX ADMIN — LIDOCAINE HYDROCHLORIDE 15 ML: 20 SOLUTION ORAL; TOPICAL at 02:26

## 2017-05-23 RX ADMIN — METOCLOPRAMIDE 5 MG: 5 TABLET ORAL at 12:06

## 2017-05-23 RX ADMIN — PANTOPRAZOLE SODIUM 40 MG: 40 TABLET, DELAYED RELEASE ORAL at 06:57

## 2017-05-23 RX ADMIN — ASPIRIN 81 MG: 81 TABLET ORAL at 08:58

## 2017-05-23 RX ADMIN — DOBUTAMINE HYDROCHLORIDE 10 MCG/KG/MIN: 100 INJECTION INTRAVENOUS at 10:19

## 2017-05-23 RX ADMIN — PREDNISONE 40 MG: 20 TABLET ORAL at 09:18

## 2017-05-23 RX ADMIN — IRBESARTAN 300 MG: 150 TABLET ORAL at 08:59

## 2017-05-23 RX ADMIN — MORPHINE SULFATE 2 MG: 2 INJECTION, SOLUTION INTRAMUSCULAR; INTRAVENOUS at 09:07

## 2017-05-23 RX ADMIN — METOCLOPRAMIDE 5 MG: 5 TABLET ORAL at 09:18

## 2017-05-23 RX ADMIN — MORPHINE SULFATE 2 MG: 2 INJECTION, SOLUTION INTRAMUSCULAR; INTRAVENOUS at 06:57

## 2017-05-23 RX ADMIN — DESMOPRESSIN ACETATE 80 MG: 0.2 TABLET ORAL at 08:59

## 2017-05-23 RX ADMIN — SUCRALFATE 1 G: 1 TABLET ORAL at 12:06

## 2017-05-23 RX ADMIN — ENOXAPARIN SODIUM 40 MG: 40 INJECTION SUBCUTANEOUS at 09:06

## 2017-05-23 RX ADMIN — ISOSORBIDE MONONITRATE 30 MG: 30 TABLET, EXTENDED RELEASE ORAL at 08:59

## 2017-05-23 RX ADMIN — ALLOPURINOL 100 MG: 100 TABLET ORAL at 08:59

## 2017-05-23 RX ADMIN — SODIUM CHLORIDE 10 ML: 9 INJECTION INTRAMUSCULAR; INTRAVENOUS; SUBCUTANEOUS at 10:01

## 2017-05-23 RX ADMIN — ESCITALOPRAM 10 MG: 10 TABLET, FILM COATED ORAL at 08:59

## 2017-05-23 RX ADMIN — BUMETANIDE 1.5 MG: 1 TABLET ORAL at 08:58

## 2017-05-23 RX ADMIN — ATROPINE SULFATE 0.6 MG: 0.1 INJECTION, SOLUTION INTRAVENOUS at 10:35

## 2017-05-23 RX ADMIN — METOLAZONE 2.5 MG: 2.5 TABLET ORAL at 08:59

## 2017-05-23 RX ADMIN — HYDROCODONE BITARTRATE AND ACETAMINOPHEN 1 TABLET: 5; 325 TABLET ORAL at 14:59

## 2017-05-23 RX ADMIN — INSULIN LISPRO 4 UNITS: 100 INJECTION, SOLUTION INTRAVENOUS; SUBCUTANEOUS at 12:06

## 2017-05-23 RX ADMIN — SUCRALFATE 1 G: 1 TABLET ORAL at 08:58

## 2017-05-23 RX ADMIN — ALUMINUM HYDROXIDE, MAGNESIUM HYDROXIDE, AND SIMETHICONE 30 ML: 200; 200; 20 SUSPENSION ORAL at 02:26

## 2017-06-07 ENCOUNTER — APPOINTMENT (OUTPATIENT)
Dept: LAB | Facility: HOSPITAL | Age: 62
End: 2017-06-07

## 2017-06-07 ENCOUNTER — TRANSCRIBE ORDERS (OUTPATIENT)
Dept: ADMINISTRATIVE | Facility: HOSPITAL | Age: 62
End: 2017-06-07

## 2017-06-07 DIAGNOSIS — E78.5 HYPERLIPIDEMIA, UNSPECIFIED HYPERLIPIDEMIA TYPE: ICD-10-CM

## 2017-06-07 DIAGNOSIS — M62.838 SPASM OF MUSCLE: ICD-10-CM

## 2017-06-07 DIAGNOSIS — M10.9 GOUT, UNSPECIFIED: ICD-10-CM

## 2017-06-07 DIAGNOSIS — E55.9 UNSPECIFIED VITAMIN D DEFICIENCY: ICD-10-CM

## 2017-06-07 DIAGNOSIS — E11.9 DIABETES MELLITUS WITHOUT COMPLICATION (HCC): ICD-10-CM

## 2017-06-07 DIAGNOSIS — I10 ESSENTIAL HYPERTENSION: Primary | ICD-10-CM

## 2017-06-07 LAB
25(OH)D3 SERPL-MCNC: 30.2 NG/ML (ref 30–100)
ALBUMIN SERPL-MCNC: 4.1 G/DL (ref 3.5–5)
ALBUMIN/GLOB SERPL: 1.4 G/DL (ref 1.1–2.5)
ALP SERPL-CCNC: 62 U/L (ref 24–120)
ALT SERPL W P-5'-P-CCNC: 30 U/L (ref 0–54)
ANION GAP SERPL CALCULATED.3IONS-SCNC: 14 MMOL/L (ref 4–13)
ARTICHOKE IGE QN: 80 MG/DL (ref 0–99)
AST SERPL-CCNC: 36 U/L (ref 7–45)
BILIRUB SERPL-MCNC: 0.6 MG/DL (ref 0.1–1)
BUN BLD-MCNC: 62 MG/DL (ref 5–21)
BUN/CREAT SERPL: 29.4 (ref 7–25)
CALCIUM SPEC-SCNC: 9.2 MG/DL (ref 8.4–10.4)
CHLORIDE SERPL-SCNC: 95 MMOL/L (ref 98–110)
CHOLEST SERPL-MCNC: 159 MG/DL (ref 130–200)
CO2 SERPL-SCNC: 31 MMOL/L (ref 24–31)
CREAT BLD-MCNC: 2.11 MG/DL (ref 0.5–1.4)
DEPRECATED RDW RBC AUTO: 41.8 FL (ref 40–54)
ERYTHROCYTE [DISTWIDTH] IN BLOOD BY AUTOMATED COUNT: 13.2 % (ref 12–15)
GFR SERPL CREATININE-BSD FRML MDRD: 32 ML/MIN/1.73
GLOBULIN UR ELPH-MCNC: 3 GM/DL
GLUCOSE BLD-MCNC: 104 MG/DL (ref 70–100)
HBA1C MFR BLD: 10 %
HCT VFR BLD AUTO: 41.6 % (ref 40–52)
HDLC SERPL-MCNC: 38 MG/DL
HGB BLD-MCNC: 14.1 G/DL (ref 14–18)
LDLC/HDLC SERPL: 1.8 {RATIO}
MAGNESIUM SERPL-MCNC: 2.4 MG/DL (ref 1.4–2.2)
MCH RBC QN AUTO: 29.4 PG (ref 28–32)
MCHC RBC AUTO-ENTMCNC: 33.9 G/DL (ref 33–36)
MCV RBC AUTO: 86.8 FL (ref 82–95)
PLATELET # BLD AUTO: 257 10*3/MM3 (ref 130–400)
PMV BLD AUTO: 11.6 FL (ref 6–12)
POTASSIUM BLD-SCNC: 3.2 MMOL/L (ref 3.5–5.3)
PROT SERPL-MCNC: 7.1 G/DL (ref 6.3–8.7)
PSA SERPL-MCNC: 0.15 NG/ML (ref 0–4)
RBC # BLD AUTO: 4.79 10*6/MM3 (ref 4.8–5.9)
SODIUM BLD-SCNC: 140 MMOL/L (ref 135–145)
TRIGL SERPL-MCNC: 263 MG/DL (ref 0–149)
TSH SERPL DL<=0.05 MIU/L-ACNC: 0.85 MIU/ML (ref 0.47–4.68)
URATE SERPL-MCNC: 9.4 MG/DL (ref 3.5–8.5)
WBC NRBC COR # BLD: 12.19 10*3/MM3 (ref 4.8–10.8)

## 2017-06-07 PROCEDURE — 84153 ASSAY OF PSA TOTAL: CPT | Performed by: FAMILY MEDICINE

## 2017-06-07 PROCEDURE — 80061 LIPID PANEL: CPT | Performed by: FAMILY MEDICINE

## 2017-06-07 PROCEDURE — 82306 VITAMIN D 25 HYDROXY: CPT | Performed by: FAMILY MEDICINE

## 2017-06-07 PROCEDURE — 82570 ASSAY OF URINE CREATININE: CPT | Performed by: FAMILY MEDICINE

## 2017-06-07 PROCEDURE — 83036 HEMOGLOBIN GLYCOSYLATED A1C: CPT | Performed by: FAMILY MEDICINE

## 2017-06-07 PROCEDURE — 82043 UR ALBUMIN QUANTITATIVE: CPT | Performed by: FAMILY MEDICINE

## 2017-06-07 PROCEDURE — 36415 COLL VENOUS BLD VENIPUNCTURE: CPT | Performed by: FAMILY MEDICINE

## 2017-06-07 PROCEDURE — 83735 ASSAY OF MAGNESIUM: CPT | Performed by: FAMILY MEDICINE

## 2017-06-07 PROCEDURE — 80053 COMPREHEN METABOLIC PANEL: CPT | Performed by: FAMILY MEDICINE

## 2017-06-07 PROCEDURE — 85027 COMPLETE CBC AUTOMATED: CPT | Performed by: FAMILY MEDICINE

## 2017-06-07 PROCEDURE — 84550 ASSAY OF BLOOD/URIC ACID: CPT | Performed by: FAMILY MEDICINE

## 2017-06-07 PROCEDURE — 84443 ASSAY THYROID STIM HORMONE: CPT | Performed by: FAMILY MEDICINE

## 2017-06-08 LAB
CREAT 24H UR-MCNC: 110.3 MG/DL
MICROALB/CRT. RATIO UR: 105.2 MG/G CREAT (ref 0–30)
MICROALBUMIN UR-MCNC: 116 UG/ML

## 2017-06-20 ENCOUNTER — OFFICE VISIT (OUTPATIENT)
Dept: CARDIAC SURGERY | Facility: CLINIC | Age: 62
End: 2017-06-20

## 2017-06-20 VITALS
HEIGHT: 72 IN | HEART RATE: 72 BPM | SYSTOLIC BLOOD PRESSURE: 140 MMHG | BODY MASS INDEX: 42.66 KG/M2 | OXYGEN SATURATION: 98 % | WEIGHT: 315 LBS | DIASTOLIC BLOOD PRESSURE: 80 MMHG

## 2017-06-20 DIAGNOSIS — R07.9 CHEST PAIN, UNSPECIFIED TYPE: Primary | ICD-10-CM

## 2017-06-20 PROCEDURE — 99213 OFFICE O/P EST LOW 20 MIN: CPT | Performed by: THORACIC SURGERY (CARDIOTHORACIC VASCULAR SURGERY)

## 2017-06-20 RX ORDER — COLCHICINE 0.6 MG/1
CAPSULE ORAL
Refills: 3 | Status: ON HOLD | COMMUNITY
Start: 2017-06-06 | End: 2018-01-17

## 2017-06-20 RX ORDER — POTASSIUM CHLORIDE 750 MG/1
TABLET, FILM COATED, EXTENDED RELEASE ORAL
Refills: 11 | Status: ON HOLD | COMMUNITY
Start: 2017-06-09 | End: 2018-01-17

## 2017-06-20 RX ORDER — ERGOCALCIFEROL 1.25 MG/1
CAPSULE ORAL
Refills: 11 | Status: ON HOLD | COMMUNITY
Start: 2017-06-09 | End: 2018-01-17

## 2017-06-20 NOTE — PROGRESS NOTES
Erick Luong  1955  Del Shetty MD  No ref. provider found    Chief Complaint: Chest pain    Present Illness: Mr. Erick Luong is a 62-year-old  male whom I performed coronary artery bypass grafting on in October 2015.  He is massively obese and has had multiple admissions for different complaints and complications of diabetes etc.  Over the last 2 months she has had chest pain continually in the substernal area.  It is not exacerbated by cough, sneezing or physical effort.  There is just a constant pain which has some exacerbation with time.    He was admitted to the hospital on May 22 and 23rd.  He had an echo which showed an ejection fraction of 50-60% ejection fraction and no valvular disease.  He had a negative stress test and no positive enzymes.  He had an upper GI done which was negative.  However his hemoglobin A1c was 9.4 and he weighed over 300 pounds.  He was discharged and saw his PCP, Dr. Shetty, and was referred to me for my opinion about his chest pain.    Past Medical History:   Diagnosis Date   • Arthritis    • CHF (congestive heart failure)    • Coronary artery disease    • Diabetes mellitus    • Hyperlipidemia    • Hypertension    • Myocardial infarction    • Pancreatitis    • Renal disorder    • Sleep apnea with use of continuous positive airway pressure (CPAP)        Past Surgical History:   Procedure Laterality Date   • ABDOMINAL SURGERY     • APPENDECTOMY     • CARDIAC SURGERY     • CATARACT EXTRACTION     • CERVICAL SPINE SURGERY     • COLONOSCOPY N/A 1/31/2017    Procedure: COLONOSCOPY WITH ANESTHESIA;  Surgeon: Cristopher Hannah MD;  Location: [facility] ENDOSCOPY;  Service:    • COLONOSCOPY W/ POLYPECTOMY  03/04/2014    Hyperplastic polyp   • CORONARY ARTERY BYPASS GRAFT  10/2015   • ENDOSCOPY  04/13/2011    Gastritis with hemorrhage   • ENDOSCOPY N/A 5/5/2017    Procedure: ESOPHAGOGASTRODUODENOSCOPY WITH ANESTHESIA;  Surgeon: Cristopher Hannah MD;  Location: [facility] ENDOSCOPY;   Service:    • INCISION AND DRAINAGE OF WOUND Left 09/2007    spider bite       Allergies   Allergen Reactions   • Bactrim [Sulfamethoxazole-Trimethoprim]          Current Outpatient Prescriptions:   •  allopurinol (ZYLOPRIM) 100 MG tablet, Take 100 mg by mouth Daily., Disp: , Rfl:   •  aspirin 81 MG EC tablet, Take 81 mg by mouth Daily., Disp: , Rfl:   •  bumetanide (BUMEX) 2 MG tablet, TAKE 1.5 TABLET EVERY DAY, Disp: , Rfl: 4  •  BYSTOLIC 5 MG tablet, TAKE 1 TABLET BY MOUTH DAILY, Disp: , Rfl: 3  •  colchicine 0.6 MG capsule capsule, TAKE 1 CAPSULE BY MOUTH 2 TIMES DAILY AS NEEDED, Disp: , Rfl: 3  •  CRESTOR 40 MG tablet, TAKE 1 TABLET EVERY DAY, Disp: , Rfl: 6  •  escitalopram (LEXAPRO) 10 MG tablet, TAKE 1 TABLET DAILY, Disp: , Rfl: 5  •  insulin regular (humuLIN R) 500 UNIT/ML CONCENTRATED injection, Inject 100 Units under the skin 3 (Three) Times a Day Before Meals. Packaging states 100 units with regular meals; 120 units with large meals or 360 units daily, Disp: , Rfl:   •  irbesartan (AVAPRO) 300 MG tablet, TAKE 1 TABLET EVERY DAY, Disp: , Rfl: 11  •  isosorbide mononitrate (IMDUR) 30 MG 24 hr tablet, Take 30 mg by mouth Daily., Disp: , Rfl:   •  KLOR-CON 10 MEQ CR tablet, TAKE 1 TABLET BY MOUTH DAILY, Disp: , Rfl: 11  •  LINZESS 290 MCG capsule capsule, TAKE 1 CAPSULE EVERY DAY, Disp: , Rfl: 3  •  metoclopramide (REGLAN) 5 MG tablet, Take 1 tablet by mouth 3 (Three) Times a Day Before Meals., Disp: 90 tablet, Rfl: 0  •  metOLazone (ZAROXOLYN) 2.5 MG tablet, Take 2.5 mg by mouth Daily., Disp: , Rfl:   •  ondansetron (ZOFRAN) 4 MG tablet, TAKE 1 TABLET BY MOUTH EVERY 4 HOURS AS NEEDED FOR NAUSEA AND VOMITING, Disp: , Rfl: 0  •  oxyCODONE-acetaminophen (PERCOCET)  MG per tablet, TAKE 1 TABLET BY MOUTH TWICE A DAY AS NEEDED, Disp: , Rfl: 0  •  pantoprazole (PROTONIX) 40 MG EC tablet, Take 1 tablet by mouth Daily., Disp: 30 tablet, Rfl: 5  •  predniSONE (DELTASONE) 10 MG tablet, 4 tabs daily x 2 days; 3  "tabs daily x 3 days; 2 tabs daily x 3 days; 1 tab daily x 3 days, Disp: 26 tablet, Rfl: 0  •  traZODone (DESYREL) 50 MG tablet, TAKE 1 TABLET BY MOUTH AT BEDTIME, Disp: , Rfl: 3  •  vitamin D (ERGOCALCIFEROL) 52065 UNITS capsule capsule, TAKE 1 CAPSULE BY MOUTH WEEKLY, Disp: , Rfl: 11    Family History   Problem Relation Age of Onset   • Colon cancer Father    • Heart disease Father    • Colon cancer Sister    • Alzheimer's disease Mother    • Coronary artery disease Sister    • Coronary artery disease Sister        Social History     Social History   • Marital status:      Spouse name: N/A   • Number of children: N/A   • Years of education: N/A     Social History Main Topics   • Smoking status: Former Smoker   • Smokeless tobacco: Never Used      Comment: smoked in [email or web address]   • Alcohol use No   • Drug use: No   • Sexual activity: Defer     Other Topics Concern   • None     Social History Narrative       ROS    /80 (BP Location: Left arm, Patient Position: Sitting, Cuff Size: Adult)  Pulse 72  Ht 72\" (182.9 cm)  Wt (!) 351 lb (159 kg)  SpO2 98%  BMI 47.6 kg/m2    Physical Exam    Physical Exam:  General Appearance:    Alert, cooperative, in no acute distress   Head:    Normocephalic, without obvious abnormality, atraumatic   Eyes:            Lids and lashes normal, conjunctivae and sclerae normal, no   icterus, no pallor, corneas clear, PERRLA   Ears:    Ears appear intact with no abnormalities noted   Throat:   No oral lesions, no thrush, oral mucosa moist   Neck:   No adenopathy, supple, trachea midline, no thyromegaly, no   carotid bruit, no JVD       Lungs:     Clear to auscultation,respirations regular, even and                  unlabored    Heart:    Regular rhythm and normal rate, normal S1 and S2, no            murmur, no gallop, no rub, no click   Chest Wall:    No abnormalities observed.  In particular his sternum was stable, not sore with no clicks and no movement on coughing.   "   Abdomen:   Markedly obese without event guarding or mass   Rectal:     Deferred   Extremities:   Moves all extremities well, no edema, no cyanosis, no             redness   Pulses:   Pulses palpable and equal bilaterally           Neurologic:   Cranial nerves 2 - 12 grossly intact, sensation intact, DTR       present and equal bilaterally     Assessment:I have examined Mr. Luong and his sternum seems stable and his lungs clear.  His heart has a regular rate and rhythm.  I do not have an explanation for his chest pain.  Recent cardiac workup was negative as well as an upper GI.  On multiple occasions he is been checked and is found to have no abnormality that his caregivers can ascertain except for his obesity and diabetes.    Do feel that a large amount of his problems has come from his obesity.  His weight is 351 pounds in the office today and his BMI is 47.6.  His wife works in the intensive care unit as a health unit coordinator and I see her almost every day.  Mr. Luong, his wife and I had a long talk.  I have no specific answer to his chest pain but I do feel that he would feel much much better if he could lose some weight.  We talked for quite some time about a healthy diet and limiting his calories and some suggestions have not accomplish this.  He understands but will need willpower to make a change in his lifestyle.      Plan: I will be glad to see him again if conditions change.  As stated above I talked with his wife regularly.  I feel the best option for him now is strict adherence to diet and exercise regimen in order to lose weight.  This will help him in many different ways.  Without weight loss his prognosis for long life is poor    Daniel Quarles MD  06/20/17  9:27 AM

## 2017-08-08 ENCOUNTER — OFFICE VISIT (OUTPATIENT)
Dept: CARDIOLOGY | Facility: CLINIC | Age: 62
End: 2017-08-08

## 2017-08-08 VITALS
OXYGEN SATURATION: 99 % | SYSTOLIC BLOOD PRESSURE: 112 MMHG | WEIGHT: 315 LBS | HEART RATE: 87 BPM | HEIGHT: 72 IN | BODY MASS INDEX: 42.66 KG/M2 | DIASTOLIC BLOOD PRESSURE: 60 MMHG

## 2017-08-08 DIAGNOSIS — I10 ESSENTIAL HYPERTENSION: ICD-10-CM

## 2017-08-08 DIAGNOSIS — R07.89 CHEST PAIN, NON-CARDIAC: Primary | ICD-10-CM

## 2017-08-08 DIAGNOSIS — E66.01 MORBID OBESITY DUE TO EXCESS CALORIES (HCC): ICD-10-CM

## 2017-08-08 DIAGNOSIS — I50.32 CHRONIC DIASTOLIC CONGESTIVE HEART FAILURE (HCC): ICD-10-CM

## 2017-08-08 DIAGNOSIS — I25.10 CORONARY ARTERY DISEASE INVOLVING NATIVE CORONARY ARTERY OF NATIVE HEART WITHOUT ANGINA PECTORIS: ICD-10-CM

## 2017-08-08 PROBLEM — R07.9 CHEST PAIN IN ADULT: Status: RESOLVED | Noted: 2017-05-23 | Resolved: 2017-08-08

## 2017-08-08 PROCEDURE — 93000 ELECTROCARDIOGRAM COMPLETE: CPT | Performed by: INTERNAL MEDICINE

## 2017-08-08 PROCEDURE — 99214 OFFICE O/P EST MOD 30 MIN: CPT | Performed by: INTERNAL MEDICINE

## 2017-08-08 NOTE — PROGRESS NOTES
Subjective:     Encounter Date: 08/08/2017      Patient ID: Erick Luong is a 62 y.o. male.With a history of coronary artery disease, status post previous coronary artery bypass grafting (left internal mammary artery graft to left anterior descending coronary artery; saphenous vein graft to diagonal coronary artery; saphenous vein graft to obtuse marginal coronary artery; saphenous vein graft to right coronary artery), chronic noncardiac chest pain, morbid obesity, type II diabetes mellitus, chronic diastolic dysfunction, hypertension, hyperlipidemia who presents today for routine follow-up.    Chief Complaint: Routine follow-up    Chest Pain    This is a chronic problem. The onset quality is gradual. The problem occurs daily. The problem has been unchanged. The pain is present in the epigastric region. The pain is mild. The quality of the pain is described as dull. The pain does not radiate. Associated symptoms include lower extremity edema (intermittently) and shortness of breath. Pertinent negatives include no abdominal pain, claudication, cough, dizziness, exertional chest pressure, fever, headaches, hemoptysis, irregular heartbeat, malaise/fatigue, nausea, near-syncope, numbness, orthopnea, palpitations, PND, syncope, vomiting or weakness. The pain is aggravated by nothing. He has tried nothing for the symptoms.   His past medical history is significant for CAD, diabetes, hyperlipidemia and hypertension.   Pertinent negatives for past medical history include no muscle weakness and no seizures. Prior diagnostic workup includes stress echo.   Coronary Artery Disease   Presents for follow-up visit. Symptoms include chest pain and shortness of breath. Pertinent negatives include no dizziness, leg swelling, muscle weakness, palpitations or weight gain. Risk factors include hyperlipidemia and hypertension. The symptoms have been stable. Compliance with diet is good. Compliance with exercise is variable.  Compliance with medications is good.   Hypertension   This is a chronic problem. The problem is unchanged. The problem is controlled. Associated symptoms include chest pain and shortness of breath. Pertinent negatives include no headaches, malaise/fatigue, orthopnea, palpitations or PND. There are no associated agents to hypertension. Risk factors for coronary artery disease include diabetes mellitus, dyslipidemia, male gender, obesity and sedentary lifestyle. Past treatments include diuretics and angiotensin blockers. The current treatment provides significant improvement. There are no compliance problems.  Hypertensive end-organ damage includes CAD/MI and heart failure. There is no history of angina.     The patient presents today for routine follow-up.  We last saw him in the hospital in May.  At that time, he was admitted for noncardiac chest pain and had a low risk stress test.  Since that time, his chest discomfort has improved.  He continues to have epigastric chest discomfort on a daily basis that is mild, nonradiating, no associated signs or symptoms, not related to activity.  He denies any exertional chest discomfort.  He has been compliant with all his medications and has not had any significant side effects.  He has chronic diastolic dysfunction.  He has some mild shortness of breath and dyspnea on exertion but the symptoms are not increased.  He has some degree of intermittent lower extremity edema however this is been well-controlled recently and he remains on diuretic therapy.  He has tried to increase his exercise and adhere to a more strict diet and as such as lost a considerable amount of weight.  Overall, the patient says that he is stable at this time and he is pleased with his progress.    Current Outpatient Prescriptions:   •  allopurinol (ZYLOPRIM) 100 MG tablet, Take 100 mg by mouth Daily., Disp: , Rfl:   •  aspirin 81 MG EC tablet, Take 81 mg by mouth Daily., Disp: , Rfl:   •  bumetanide  (BUMEX) 2 MG tablet, TAKE 1.5 TABLET EVERY DAY, Disp: , Rfl: 4  •  BYSTOLIC 5 MG tablet, TAKE 1 TABLET BY MOUTH DAILY, Disp: , Rfl: 3  •  colchicine 0.6 MG capsule capsule, TAKE 1 CAPSULE BY MOUTH 2 TIMES DAILY AS NEEDED, Disp: , Rfl: 3  •  CRESTOR 40 MG tablet, TAKE 1 TABLET EVERY DAY, Disp: , Rfl: 6  •  escitalopram (LEXAPRO) 10 MG tablet, TAKE 1 TABLET DAILY, Disp: , Rfl: 5  •  insulin regular (humuLIN R) 500 UNIT/ML CONCENTRATED injection, Inject 100 Units under the skin 3 (Three) Times a Day Before Meals. Packaging states 100 units with regular meals; 120 units with large meals or 360 units daily, Disp: , Rfl:   •  irbesartan (AVAPRO) 300 MG tablet, TAKE 1 TABLET EVERY DAY, Disp: , Rfl: 11  •  isosorbide mononitrate (IMDUR) 30 MG 24 hr tablet, Take 30 mg by mouth Daily., Disp: , Rfl:   •  KLOR-CON 10 MEQ CR tablet, TAKE 1 TABLET BY MOUTH DAILY, Disp: , Rfl: 11  •  LINZESS 290 MCG capsule capsule, TAKE 1 CAPSULE EVERY DAY, Disp: , Rfl: 3  •  metoclopramide (REGLAN) 5 MG tablet, Take 1 tablet by mouth 3 (Three) Times a Day Before Meals., Disp: 90 tablet, Rfl: 0  •  metOLazone (ZAROXOLYN) 2.5 MG tablet, Take 2.5 mg by mouth Daily., Disp: , Rfl:   •  ondansetron (ZOFRAN) 4 MG tablet, TAKE 1 TABLET BY MOUTH EVERY 4 HOURS AS NEEDED FOR NAUSEA AND VOMITING, Disp: , Rfl: 0  •  oxyCODONE-acetaminophen (PERCOCET)  MG per tablet, TAKE 1 TABLET BY MOUTH TWICE A DAY AS NEEDED, Disp: , Rfl: 0  •  pantoprazole (PROTONIX) 40 MG EC tablet, Take 1 tablet by mouth Daily., Disp: 30 tablet, Rfl: 5  •  predniSONE (DELTASONE) 10 MG tablet, 4 tabs daily x 2 days; 3 tabs daily x 3 days; 2 tabs daily x 3 days; 1 tab daily x 3 days, Disp: 26 tablet, Rfl: 0  •  traZODone (DESYREL) 50 MG tablet, TAKE 1 TABLET BY MOUTH AT BEDTIME, Disp: , Rfl: 3  •  vitamin D (ERGOCALCIFEROL) 94598 UNITS capsule capsule, TAKE 1 CAPSULE BY MOUTH WEEKLY, Disp: , Rfl: 11    Allergies   Allergen Reactions   • Bactrim [Sulfamethoxazole-Trimethoprim]       Social History   Substance Use Topics   • Smoking status: Former Smoker   • Smokeless tobacco: Never Used      Comment: smoked in highschool   • Alcohol use No     Review of Systems   Constitution: Negative for chills, fever, weakness, malaise/fatigue, night sweats, weight gain and weight loss.   HENT: Negative for congestion, headaches and sore throat.    Cardiovascular: Positive for chest pain and dyspnea on exertion. Negative for claudication, irregular heartbeat, leg swelling, near-syncope, orthopnea, palpitations, paroxysmal nocturnal dyspnea and syncope.   Respiratory: Positive for shortness of breath. Negative for cough, hemoptysis and wheezing.    Endocrine: Negative for cold intolerance, heat intolerance, polydipsia and polyuria.   Hematologic/Lymphatic: Negative for bleeding problem. Does not bruise/bleed easily.   Musculoskeletal: Negative for muscle weakness.   Gastrointestinal: Positive for heartburn. Negative for abdominal pain, hematemesis, hematochezia, melena, nausea and vomiting.   Genitourinary: Negative for bladder incontinence, dysuria and hematuria.   Neurological: Negative for dizziness, loss of balance, numbness, paresthesias and seizures.       ECG 12 Lead  Date/Time: 8/8/2017 4:40 PM  Performed by: RIVERA GANT  Authorized by: RIVERA GANT   Comparison: compared with previous ECG from 2/7/2017  Similar to previous ECG  Rhythm: sinus rhythm  Rate: normal  BPM: 78  Conduction: 1st degree and non-specific intraventricular conduction delay  QRS axis: left  Clinical impression: abnormal ECG             Objective:     Physical Exam   Constitutional: He is oriented to person, place, and time. He appears well-developed and well-nourished. No distress.   HENT:   Head: Normocephalic and atraumatic.   Mouth/Throat: Oropharynx is clear and moist.   Eyes: EOM are normal. Pupils are equal, round, and reactive to light.   Neck: Normal range of motion. Neck supple. No JVD present.  "No thyromegaly present.   Cardiovascular: Normal rate, regular rhythm, S1 normal, S2 normal, normal heart sounds and intact distal pulses.  Exam reveals no gallop and no friction rub.    No murmur heard.  Pulmonary/Chest: Effort normal and breath sounds normal.   Abdominal: Soft. Bowel sounds are normal. He exhibits no distension. There is no tenderness.   Musculoskeletal: Normal range of motion. He exhibits no edema.   Neurological: He is alert and oriented to person, place, and time. No cranial nerve deficit.   Skin: Skin is warm and dry. No rash noted. No cyanosis or erythema. Nails show no clubbing.   Psychiatric: He has a normal mood and affect.   Vitals reviewed.    /60 (BP Location: Left arm, Patient Position: Sitting)  Pulse 87  Ht 72\" (182.9 cm)  Wt (!) 333 lb (151 kg)  SpO2 99%  BMI 45.16 kg/m2    Data/Lab Review:     Lipid panel 6/7/2017  Total Cholesterol 130 - 200 mg/dL 159   Triglycerides 0 - 149 mg/dL 263 (H)   HDL Cholesterol >=40 mg/dL 38 (L)   LDL Cholesterol  0 - 99 mg/dL 80     Stress echo 5/23/2017  · Baseline ECG of normal sinus rhythm noted. 1st degree atrio-ventricular block noted. Nonspecific interventricular conduction delay is present. No acute changes.  · Patient complained of 8/10 chest pain pre-test and this did not increase with stress.  · There was no ST segment deviation noted during stress.  · The echo images are technically difficult; however, there are no obvious regional wall motion abnormalities, despite compliants of chest discomfort.  · Would consider this a low risk stress test - no ECG changes and no obvious wall motion abnormalities with stress.      Assessment:          Diagnosis Plan   1. Chest pain, non-cardiac     2. Coronary artery disease involving native coronary artery of native heart without angina pectoris  ECG 12 Lead   3. Chronic diastolic congestive heart failure     4. Essential hypertension     5. Morbid obesity due to excess calories        "   Plan:       1.  Noncardiac chest pain: The patient is a history of chronic noncardiac chest pain.  He recently underwent a stress test which is noted above.  This was thought to be low risk stress test.  No further cardiac workup is indicated at this time.  This patient has been admitted multiple times for this noncardiac chest pain.  It is my opinion that no further cardiac workup is indicated.  If she says that his symptoms have improved but he continues to have daily intermittent chest pain.    2.  Coronary artery disease: Clinically, the patient is stable at this time with no ischemic symptoms.  As noted, his chronic chest discomfort is noncardiac.  Continue aspirin indefinitely.  The patient also remains on statin, isosorbide mononitrate, beta blocker therapies.  Continue these without change.    3.  Chronic diastolic congestive heart failure: The patient appears to be euvolemic on examination today.  He has lost weight intentionally with diet and exercise recently.  He has minimal lower extremity edema which is intermittent.  He is on diuretics.  Continue current medical therapies.    4.  Essential hypertension: The patient's blood pressure is well-controlled.  Continue current medications.    5.  Morbid obesity: I commended the patient today on his recent weight loss due to diet and exercise changes.  I have encouraged him to continue his current level of activity and dietary restrictions.    Follow-up: 6 months unless needed sooner.    EMR Dragon/Transcription disclaimer: Much of this encounter note is an electronic transcription/translation of spoken language to printed text. The electronic translation of spoken language may permit erroneous, or at times, nonsensical words or phrases to be inadvertently transcribed; although I have reviewed the note for such errors, some may still exist.

## 2017-09-19 RX ORDER — PANTOPRAZOLE SODIUM 40 MG/1
40 TABLET, DELAYED RELEASE ORAL DAILY
Qty: 90 TABLET | Refills: 3 | Status: SHIPPED | OUTPATIENT
Start: 2017-09-19 | End: 2021-01-18

## 2017-10-26 ENCOUNTER — TRANSCRIBE ORDERS (OUTPATIENT)
Dept: ADMINISTRATIVE | Facility: HOSPITAL | Age: 62
End: 2017-10-26

## 2017-10-26 ENCOUNTER — APPOINTMENT (OUTPATIENT)
Dept: LAB | Facility: HOSPITAL | Age: 62
End: 2017-10-26

## 2017-10-26 DIAGNOSIS — N18.30 CHRONIC KIDNEY DISEASE, STAGE III (MODERATE) (HCC): Primary | ICD-10-CM

## 2017-10-26 LAB
25(OH)D3 SERPL-MCNC: 35.4 NG/ML (ref 30–100)
ALBUMIN SERPL-MCNC: 4.8 G/DL (ref 3.5–5)
ALBUMIN/GLOB SERPL: 1.7 G/DL (ref 1.1–2.5)
ALP SERPL-CCNC: 77 U/L (ref 24–120)
ALT SERPL W P-5'-P-CCNC: 34 U/L (ref 0–54)
ANION GAP SERPL CALCULATED.3IONS-SCNC: 18 MMOL/L (ref 4–13)
AST SERPL-CCNC: 27 U/L (ref 7–45)
BILIRUB SERPL-MCNC: 0.6 MG/DL (ref 0.1–1)
BILIRUB UR QL STRIP: NEGATIVE
BUN BLD-MCNC: 56 MG/DL (ref 5–21)
BUN/CREAT SERPL: 27.6 (ref 7–25)
CALCIUM SPEC-SCNC: 9.7 MG/DL (ref 8.4–10.4)
CHLORIDE SERPL-SCNC: 97 MMOL/L (ref 98–110)
CLARITY UR: CLEAR
CO2 SERPL-SCNC: 21 MMOL/L (ref 24–31)
COLOR UR: YELLOW
CREAT BLD-MCNC: 2.03 MG/DL (ref 0.5–1.4)
DEPRECATED RDW RBC AUTO: 44.5 FL (ref 40–54)
ERYTHROCYTE [DISTWIDTH] IN BLOOD BY AUTOMATED COUNT: 13.9 % (ref 12–15)
GFR SERPL CREATININE-BSD FRML MDRD: 34 ML/MIN/1.73
GLOBULIN UR ELPH-MCNC: 2.9 GM/DL
GLUCOSE BLD-MCNC: 421 MG/DL (ref 70–100)
GLUCOSE UR STRIP-MCNC: ABNORMAL MG/DL
HCT VFR BLD AUTO: 41 % (ref 40–52)
HGB BLD-MCNC: 14.1 G/DL (ref 14–18)
HGB UR QL STRIP.AUTO: NEGATIVE
KETONES UR QL STRIP: NEGATIVE
LEUKOCYTE ESTERASE UR QL STRIP.AUTO: NEGATIVE
MCH RBC QN AUTO: 30.3 PG (ref 28–32)
MCHC RBC AUTO-ENTMCNC: 34.4 G/DL (ref 33–36)
MCV RBC AUTO: 88 FL (ref 82–95)
NITRITE UR QL STRIP: NEGATIVE
PH UR STRIP.AUTO: <=5 [PH] (ref 5–8)
PLATELET # BLD AUTO: 245 10*3/MM3 (ref 130–400)
PMV BLD AUTO: 11.8 FL (ref 6–12)
POTASSIUM BLD-SCNC: 4.4 MMOL/L (ref 3.5–5.3)
PROT SERPL-MCNC: 7.7 G/DL (ref 6.3–8.7)
PROT UR QL STRIP: NEGATIVE
PTH-INTACT SERPL-MCNC: 52.1 PG/ML (ref 7.5–53.5)
RBC # BLD AUTO: 4.66 10*6/MM3 (ref 4.8–5.9)
SODIUM BLD-SCNC: 136 MMOL/L (ref 135–145)
SP GR UR STRIP: 1.02 (ref 1–1.03)
URATE SERPL-MCNC: 9.7 MG/DL (ref 3.5–8.5)
UROBILINOGEN UR QL STRIP: ABNORMAL
WBC NRBC COR # BLD: 6.49 10*3/MM3 (ref 4.8–10.8)

## 2017-10-26 PROCEDURE — 83970 ASSAY OF PARATHORMONE: CPT | Performed by: NURSE PRACTITIONER

## 2017-10-26 PROCEDURE — 82306 VITAMIN D 25 HYDROXY: CPT | Performed by: NURSE PRACTITIONER

## 2017-10-26 PROCEDURE — 80053 COMPREHEN METABOLIC PANEL: CPT | Performed by: NURSE PRACTITIONER

## 2017-10-26 PROCEDURE — 85027 COMPLETE CBC AUTOMATED: CPT | Performed by: NURSE PRACTITIONER

## 2017-10-26 PROCEDURE — 82570 ASSAY OF URINE CREATININE: CPT | Performed by: NURSE PRACTITIONER

## 2017-10-26 PROCEDURE — 84550 ASSAY OF BLOOD/URIC ACID: CPT | Performed by: NURSE PRACTITIONER

## 2017-10-26 PROCEDURE — 36415 COLL VENOUS BLD VENIPUNCTURE: CPT

## 2017-10-26 PROCEDURE — 82043 UR ALBUMIN QUANTITATIVE: CPT | Performed by: NURSE PRACTITIONER

## 2017-10-26 PROCEDURE — 81003 URINALYSIS AUTO W/O SCOPE: CPT | Performed by: NURSE PRACTITIONER

## 2017-10-27 LAB
CREAT 24H UR-MCNC: 25.8 MG/DL
MICROALBUMIN UR-MCNC: 5.5 UG/ML
MICROALBUMIN/CREAT UR: 21.3 MG/G CREAT (ref 0–30)

## 2017-12-20 ENCOUNTER — TRANSCRIBE ORDERS (OUTPATIENT)
Dept: ADMINISTRATIVE | Facility: HOSPITAL | Age: 62
End: 2017-12-20

## 2017-12-20 ENCOUNTER — APPOINTMENT (OUTPATIENT)
Dept: LAB | Facility: HOSPITAL | Age: 62
End: 2017-12-20

## 2017-12-20 DIAGNOSIS — N18.30 CHRONIC KIDNEY DISEASE, STAGE III (MODERATE) (HCC): ICD-10-CM

## 2017-12-20 DIAGNOSIS — E11.40 DIABETIC NEUROPATHY WITH NEUROLOGIC COMPLICATION (HCC): ICD-10-CM

## 2017-12-20 DIAGNOSIS — E11.49 DIABETIC NEUROPATHY WITH NEUROLOGIC COMPLICATION (HCC): ICD-10-CM

## 2017-12-20 DIAGNOSIS — E55.9 VITAMIN D DEFICIENCY DISEASE: Primary | ICD-10-CM

## 2017-12-20 DIAGNOSIS — E78.00 PURE HYPERCHOLESTEROLEMIA: ICD-10-CM

## 2017-12-20 LAB
25(OH)D3 SERPL-MCNC: 41.1 NG/ML (ref 30–100)
ALBUMIN SERPL-MCNC: 4.3 G/DL (ref 3.5–5)
ALBUMIN/GLOB SERPL: 1.6 G/DL (ref 1.1–2.5)
ALP SERPL-CCNC: 65 U/L (ref 24–120)
ALT SERPL W P-5'-P-CCNC: 36 U/L (ref 0–54)
ANION GAP SERPL CALCULATED.3IONS-SCNC: 14 MMOL/L (ref 4–13)
ARTICHOKE IGE QN: 59 MG/DL (ref 0–99)
AST SERPL-CCNC: 25 U/L (ref 7–45)
BILIRUB SERPL-MCNC: 0.7 MG/DL (ref 0.1–1)
BUN BLD-MCNC: 57 MG/DL (ref 5–21)
BUN/CREAT SERPL: 23.1 (ref 7–25)
CALCIUM SPEC-SCNC: 9.3 MG/DL (ref 8.4–10.4)
CHLORIDE SERPL-SCNC: 96 MMOL/L (ref 98–110)
CHOLEST SERPL-MCNC: 138 MG/DL (ref 130–200)
CO2 SERPL-SCNC: 30 MMOL/L (ref 24–31)
CREAT BLD-MCNC: 2.47 MG/DL (ref 0.5–1.4)
DEPRECATED RDW RBC AUTO: 42.3 FL (ref 40–54)
ERYTHROCYTE [DISTWIDTH] IN BLOOD BY AUTOMATED COUNT: 13.2 % (ref 12–15)
GFR SERPL CREATININE-BSD FRML MDRD: 27 ML/MIN/1.73
GLOBULIN UR ELPH-MCNC: 2.7 GM/DL
GLUCOSE BLD-MCNC: 326 MG/DL (ref 70–100)
HBA1C MFR BLD: 10.1 %
HCT VFR BLD AUTO: 37.7 % (ref 40–52)
HDLC SERPL-MCNC: 32 MG/DL
HGB BLD-MCNC: 12.8 G/DL (ref 14–18)
LDLC/HDLC SERPL: 1.16 {RATIO}
MCH RBC QN AUTO: 29.8 PG (ref 28–32)
MCHC RBC AUTO-ENTMCNC: 34 G/DL (ref 33–36)
MCV RBC AUTO: 87.7 FL (ref 82–95)
PLATELET # BLD AUTO: 210 10*3/MM3 (ref 130–400)
PMV BLD AUTO: 11.9 FL (ref 6–12)
POTASSIUM BLD-SCNC: 4.5 MMOL/L (ref 3.5–5.3)
PROT SERPL-MCNC: 7 G/DL (ref 6.3–8.7)
RBC # BLD AUTO: 4.3 10*6/MM3 (ref 4.8–5.9)
SODIUM BLD-SCNC: 140 MMOL/L (ref 135–145)
TRIGL SERPL-MCNC: 345 MG/DL (ref 0–149)
TSH SERPL DL<=0.05 MIU/L-ACNC: 1.07 MIU/ML (ref 0.47–4.68)
WBC NRBC COR # BLD: 6.47 10*3/MM3 (ref 4.8–10.8)

## 2017-12-20 PROCEDURE — 82306 VITAMIN D 25 HYDROXY: CPT | Performed by: FAMILY MEDICINE

## 2017-12-20 PROCEDURE — 80061 LIPID PANEL: CPT | Performed by: FAMILY MEDICINE

## 2017-12-20 PROCEDURE — 36415 COLL VENOUS BLD VENIPUNCTURE: CPT

## 2017-12-20 PROCEDURE — 82043 UR ALBUMIN QUANTITATIVE: CPT | Performed by: FAMILY MEDICINE

## 2017-12-20 PROCEDURE — 85027 COMPLETE CBC AUTOMATED: CPT | Performed by: FAMILY MEDICINE

## 2017-12-20 PROCEDURE — 83036 HEMOGLOBIN GLYCOSYLATED A1C: CPT | Performed by: FAMILY MEDICINE

## 2017-12-20 PROCEDURE — 84443 ASSAY THYROID STIM HORMONE: CPT | Performed by: FAMILY MEDICINE

## 2017-12-20 PROCEDURE — 80053 COMPREHEN METABOLIC PANEL: CPT | Performed by: FAMILY MEDICINE

## 2017-12-20 PROCEDURE — 82570 ASSAY OF URINE CREATININE: CPT | Performed by: FAMILY MEDICINE

## 2017-12-21 LAB
CREAT 24H UR-MCNC: 45 MG/DL
MICROALBUMIN UR-MCNC: <3 UG/ML
MICROALBUMIN/CREAT UR: <6.7 MG/G CREAT (ref 0–30)

## 2017-12-24 ENCOUNTER — HOSPITAL ENCOUNTER (EMERGENCY)
Facility: HOSPITAL | Age: 62
Discharge: HOME | End: 2017-12-25
Attending: EMERGENCY MEDICINE | Admitting: EMERGENCY MEDICINE

## 2017-12-24 ENCOUNTER — APPOINTMENT (OUTPATIENT)
Dept: GENERAL RADIOLOGY | Facility: HOSPITAL | Age: 62
End: 2017-12-24

## 2017-12-24 DIAGNOSIS — J20.9 ACUTE BRONCHITIS, UNSPECIFIED ORGANISM: Primary | ICD-10-CM

## 2017-12-24 DIAGNOSIS — J10.1 INFLUENZA A: ICD-10-CM

## 2017-12-24 LAB
ALBUMIN SERPL-MCNC: 4.3 G/DL (ref 3.5–5)
ALBUMIN/GLOB SERPL: 1.6 G/DL (ref 1.1–2.5)
ALP SERPL-CCNC: 56 U/L (ref 24–120)
ALT SERPL W P-5'-P-CCNC: 49 U/L (ref 0–54)
ANION GAP SERPL CALCULATED.3IONS-SCNC: 15 MMOL/L (ref 4–13)
AST SERPL-CCNC: 49 U/L (ref 7–45)
BASOPHILS # BLD AUTO: 0.04 10*3/MM3 (ref 0–0.2)
BASOPHILS NFR BLD AUTO: 0.7 % (ref 0–2)
BILIRUB SERPL-MCNC: 0.5 MG/DL (ref 0.1–1)
BUN BLD-MCNC: 52 MG/DL (ref 5–21)
BUN/CREAT SERPL: 19.5 (ref 7–25)
CALCIUM SPEC-SCNC: 9.1 MG/DL (ref 8.4–10.4)
CHLORIDE SERPL-SCNC: 99 MMOL/L (ref 98–110)
CO2 SERPL-SCNC: 25 MMOL/L (ref 24–31)
CREAT BLD-MCNC: 2.66 MG/DL (ref 0.5–1.4)
DEPRECATED RDW RBC AUTO: 41.7 FL (ref 40–54)
EOSINOPHIL # BLD AUTO: 0.12 10*3/MM3 (ref 0–0.7)
EOSINOPHIL NFR BLD AUTO: 2.1 % (ref 0–4)
ERYTHROCYTE [DISTWIDTH] IN BLOOD BY AUTOMATED COUNT: 13.3 % (ref 12–15)
FLUAV AG NPH QL: POSITIVE
FLUBV AG NPH QL IA: NEGATIVE
GFR SERPL CREATININE-BSD FRML MDRD: 25 ML/MIN/1.73
GLOBULIN UR ELPH-MCNC: 2.7 GM/DL
GLUCOSE BLD-MCNC: 143 MG/DL (ref 70–100)
HCT VFR BLD AUTO: 37 % (ref 40–52)
HGB BLD-MCNC: 12.7 G/DL (ref 14–18)
HOLD SPECIMEN: NORMAL
HOLD SPECIMEN: NORMAL
IMM GRANULOCYTES # BLD: 0.02 10*3/MM3 (ref 0–0.03)
IMM GRANULOCYTES NFR BLD: 0.4 % (ref 0–5)
LYMPHOCYTES # BLD AUTO: 1.41 10*3/MM3 (ref 0.72–4.86)
LYMPHOCYTES NFR BLD AUTO: 24.7 % (ref 15–45)
MCH RBC QN AUTO: 29.7 PG (ref 28–32)
MCHC RBC AUTO-ENTMCNC: 34.3 G/DL (ref 33–36)
MCV RBC AUTO: 86.4 FL (ref 82–95)
MONOCYTES # BLD AUTO: 1.04 10*3/MM3 (ref 0.19–1.3)
MONOCYTES NFR BLD AUTO: 18.2 % (ref 4–12)
NEUTROPHILS # BLD AUTO: 3.08 10*3/MM3 (ref 1.87–8.4)
NEUTROPHILS NFR BLD AUTO: 53.9 % (ref 39–78)
NRBC BLD MANUAL-RTO: 0 /100 WBC (ref 0–0)
PLATELET # BLD AUTO: 181 10*3/MM3 (ref 130–400)
PMV BLD AUTO: 11.9 FL (ref 6–12)
POTASSIUM BLD-SCNC: 3.9 MMOL/L (ref 3.5–5.3)
PROT SERPL-MCNC: 7 G/DL (ref 6.3–8.7)
RBC # BLD AUTO: 4.28 10*6/MM3 (ref 4.8–5.9)
SODIUM BLD-SCNC: 139 MMOL/L (ref 135–145)
WBC NRBC COR # BLD: 5.71 10*3/MM3 (ref 4.8–10.8)
WHOLE BLOOD HOLD SPECIMEN: NORMAL
WHOLE BLOOD HOLD SPECIMEN: NORMAL

## 2017-12-24 PROCEDURE — 71020 HC CHEST PA AND LATERAL: CPT

## 2017-12-24 PROCEDURE — 93005 ELECTROCARDIOGRAM TRACING: CPT

## 2017-12-24 PROCEDURE — 87804 INFLUENZA ASSAY W/OPTIC: CPT | Performed by: EMERGENCY MEDICINE

## 2017-12-24 PROCEDURE — 93010 ELECTROCARDIOGRAM REPORT: CPT | Performed by: INTERNAL MEDICINE

## 2017-12-24 PROCEDURE — 99284 EMERGENCY DEPT VISIT MOD MDM: CPT

## 2017-12-24 PROCEDURE — 80053 COMPREHEN METABOLIC PANEL: CPT | Performed by: EMERGENCY MEDICINE

## 2017-12-24 PROCEDURE — 87040 BLOOD CULTURE FOR BACTERIA: CPT | Performed by: EMERGENCY MEDICINE

## 2017-12-24 PROCEDURE — 83605 ASSAY OF LACTIC ACID: CPT | Performed by: EMERGENCY MEDICINE

## 2017-12-24 PROCEDURE — 96360 HYDRATION IV INFUSION INIT: CPT

## 2017-12-24 PROCEDURE — 85025 COMPLETE CBC W/AUTO DIFF WBC: CPT | Performed by: EMERGENCY MEDICINE

## 2017-12-24 RX ORDER — ACETAMINOPHEN 325 MG/1
650 TABLET ORAL ONCE
Status: COMPLETED | OUTPATIENT
Start: 2017-12-24 | End: 2017-12-24

## 2017-12-24 RX ORDER — SODIUM CHLORIDE 0.9 % (FLUSH) 0.9 %
10 SYRINGE (ML) INJECTION AS NEEDED
Status: DISCONTINUED | OUTPATIENT
Start: 2017-12-24 | End: 2017-12-25 | Stop reason: HOSPADM

## 2017-12-24 RX ADMIN — ACETAMINOPHEN 650 MG: 325 TABLET ORAL at 23:40

## 2017-12-24 RX ADMIN — SODIUM CHLORIDE 1000 ML: 9 INJECTION, SOLUTION INTRAVENOUS at 23:55

## 2017-12-25 VITALS
OXYGEN SATURATION: 93 % | TEMPERATURE: 99.6 F | SYSTOLIC BLOOD PRESSURE: 122 MMHG | BODY MASS INDEX: 42.66 KG/M2 | RESPIRATION RATE: 16 BRPM | DIASTOLIC BLOOD PRESSURE: 72 MMHG | HEIGHT: 72 IN | WEIGHT: 315 LBS | HEART RATE: 105 BPM

## 2017-12-25 LAB — D-LACTATE SERPL-SCNC: 1.7 MMOL/L (ref 0.5–2)

## 2017-12-25 RX ORDER — DOXYCYCLINE HYCLATE 100 MG/1
100 TABLET, DELAYED RELEASE ORAL 2 TIMES DAILY
Qty: 20 TABLET | Refills: 0 | Status: ON HOLD | OUTPATIENT
Start: 2017-12-25 | End: 2018-01-17

## 2017-12-28 ENCOUNTER — HOSPITAL ENCOUNTER (EMERGENCY)
Facility: HOSPITAL | Age: 62
Discharge: HOME | End: 2017-12-28
Attending: FAMILY MEDICINE | Admitting: FAMILY MEDICINE

## 2017-12-28 VITALS
DIASTOLIC BLOOD PRESSURE: 58 MMHG | BODY MASS INDEX: 42.66 KG/M2 | OXYGEN SATURATION: 98 % | HEART RATE: 64 BPM | TEMPERATURE: 98.2 F | SYSTOLIC BLOOD PRESSURE: 116 MMHG | WEIGHT: 315 LBS | HEIGHT: 72 IN | RESPIRATION RATE: 20 BRPM

## 2017-12-28 DIAGNOSIS — R55 SYNCOPE, UNSPECIFIED SYNCOPE TYPE: Primary | ICD-10-CM

## 2017-12-28 LAB
ALBUMIN SERPL-MCNC: 3.9 G/DL (ref 3.5–5)
ALBUMIN/GLOB SERPL: 1.4 G/DL (ref 1.1–2.5)
ALP SERPL-CCNC: 51 U/L (ref 24–120)
ALT SERPL W P-5'-P-CCNC: 46 U/L (ref 0–54)
ANION GAP SERPL CALCULATED.3IONS-SCNC: 16 MMOL/L (ref 4–13)
AST SERPL-CCNC: 43 U/L (ref 7–45)
BASOPHILS # BLD AUTO: 0.01 10*3/MM3 (ref 0–0.2)
BASOPHILS NFR BLD AUTO: 0.3 % (ref 0–2)
BILIRUB SERPL-MCNC: 0.5 MG/DL (ref 0.1–1)
BILIRUB UR QL STRIP: NEGATIVE
BUN BLD-MCNC: 68 MG/DL (ref 5–21)
BUN/CREAT SERPL: 28.5 (ref 7–25)
CALCIUM SPEC-SCNC: 8.4 MG/DL (ref 8.4–10.4)
CHLORIDE SERPL-SCNC: 95 MMOL/L (ref 98–110)
CLARITY UR: CLEAR
CO2 SERPL-SCNC: 24 MMOL/L (ref 24–31)
COLOR UR: YELLOW
CREAT BLD-MCNC: 2.39 MG/DL (ref 0.5–1.4)
DEPRECATED RDW RBC AUTO: 42.3 FL (ref 40–54)
EOSINOPHIL # BLD AUTO: 0.18 10*3/MM3 (ref 0–0.7)
EOSINOPHIL NFR BLD AUTO: 5.5 % (ref 0–4)
ERYTHROCYTE [DISTWIDTH] IN BLOOD BY AUTOMATED COUNT: 13.6 % (ref 12–15)
GFR SERPL CREATININE-BSD FRML MDRD: 28 ML/MIN/1.73
GLOBULIN UR ELPH-MCNC: 2.7 GM/DL
GLUCOSE BLD-MCNC: 369 MG/DL (ref 70–100)
GLUCOSE UR STRIP-MCNC: ABNORMAL MG/DL
HCT VFR BLD AUTO: 33.2 % (ref 40–52)
HGB BLD-MCNC: 11.7 G/DL (ref 14–18)
HGB UR QL STRIP.AUTO: NEGATIVE
IMM GRANULOCYTES # BLD: 0.01 10*3/MM3 (ref 0–0.03)
IMM GRANULOCYTES NFR BLD: 0.3 % (ref 0–5)
KETONES UR QL STRIP: NEGATIVE
LEUKOCYTE ESTERASE UR QL STRIP.AUTO: NEGATIVE
LYMPHOCYTES # BLD AUTO: 1.24 10*3/MM3 (ref 0.72–4.86)
LYMPHOCYTES NFR BLD AUTO: 37.6 % (ref 15–45)
MCH RBC QN AUTO: 29.8 PG (ref 28–32)
MCHC RBC AUTO-ENTMCNC: 35.2 G/DL (ref 33–36)
MCV RBC AUTO: 84.7 FL (ref 82–95)
MONOCYTES # BLD AUTO: 0.38 10*3/MM3 (ref 0.19–1.3)
MONOCYTES NFR BLD AUTO: 11.5 % (ref 4–12)
NEUTROPHILS # BLD AUTO: 1.48 10*3/MM3 (ref 1.87–8.4)
NEUTROPHILS NFR BLD AUTO: 44.8 % (ref 39–78)
NITRITE UR QL STRIP: NEGATIVE
NRBC BLD MANUAL-RTO: 0 /100 WBC (ref 0–0)
PH UR STRIP.AUTO: <=5 [PH] (ref 5–8)
PLATELET # BLD AUTO: 157 10*3/MM3 (ref 130–400)
PMV BLD AUTO: 11.9 FL (ref 6–12)
POTASSIUM BLD-SCNC: 3.9 MMOL/L (ref 3.5–5.3)
PROT SERPL-MCNC: 6.6 G/DL (ref 6.3–8.7)
PROT UR QL STRIP: NEGATIVE
RBC # BLD AUTO: 3.92 10*6/MM3 (ref 4.8–5.9)
SODIUM BLD-SCNC: 135 MMOL/L (ref 135–145)
SP GR UR STRIP: 1.02 (ref 1–1.03)
UROBILINOGEN UR QL STRIP: ABNORMAL
WBC NRBC COR # BLD: 3.3 10*3/MM3 (ref 4.8–10.8)

## 2017-12-28 PROCEDURE — 96360 HYDRATION IV INFUSION INIT: CPT

## 2017-12-28 PROCEDURE — 93010 ELECTROCARDIOGRAM REPORT: CPT | Performed by: INTERNAL MEDICINE

## 2017-12-28 PROCEDURE — 85025 COMPLETE CBC W/AUTO DIFF WBC: CPT | Performed by: FAMILY MEDICINE

## 2017-12-28 PROCEDURE — 80053 COMPREHEN METABOLIC PANEL: CPT | Performed by: FAMILY MEDICINE

## 2017-12-28 PROCEDURE — 81003 URINALYSIS AUTO W/O SCOPE: CPT | Performed by: FAMILY MEDICINE

## 2017-12-28 PROCEDURE — 99283 EMERGENCY DEPT VISIT LOW MDM: CPT

## 2017-12-28 PROCEDURE — 96361 HYDRATE IV INFUSION ADD-ON: CPT

## 2017-12-28 PROCEDURE — 93005 ELECTROCARDIOGRAM TRACING: CPT | Performed by: FAMILY MEDICINE

## 2017-12-28 RX ORDER — ONDANSETRON 4 MG/1
4 TABLET, ORALLY DISINTEGRATING ORAL 4 TIMES DAILY PRN
Qty: 20 TABLET | Refills: 0 | Status: ON HOLD | OUTPATIENT
Start: 2017-12-28 | End: 2018-01-17 | Stop reason: SDUPTHER

## 2017-12-28 RX ADMIN — SODIUM CHLORIDE 1000 ML: 9 INJECTION, SOLUTION INTRAVENOUS at 15:12

## 2017-12-30 ENCOUNTER — APPOINTMENT (OUTPATIENT)
Dept: GENERAL RADIOLOGY | Facility: HOSPITAL | Age: 62
End: 2017-12-30

## 2017-12-30 ENCOUNTER — HOSPITAL ENCOUNTER (EMERGENCY)
Facility: HOSPITAL | Age: 62
Discharge: HOME | End: 2017-12-30
Admitting: EMERGENCY MEDICINE

## 2017-12-30 VITALS
HEIGHT: 72 IN | WEIGHT: 315 LBS | OXYGEN SATURATION: 99 % | HEART RATE: 73 BPM | DIASTOLIC BLOOD PRESSURE: 70 MMHG | RESPIRATION RATE: 20 BRPM | SYSTOLIC BLOOD PRESSURE: 131 MMHG | TEMPERATURE: 98.2 F | BODY MASS INDEX: 42.66 KG/M2

## 2017-12-30 DIAGNOSIS — B34.9 VIRAL ILLNESS: Primary | ICD-10-CM

## 2017-12-30 LAB
ALBUMIN SERPL-MCNC: 4 G/DL (ref 3.5–5)
ALBUMIN/GLOB SERPL: 1.4 G/DL (ref 1.1–2.5)
ALP SERPL-CCNC: 56 U/L (ref 24–120)
ALT SERPL W P-5'-P-CCNC: 39 U/L (ref 0–54)
ANION GAP SERPL CALCULATED.3IONS-SCNC: 12 MMOL/L (ref 4–13)
AST SERPL-CCNC: 38 U/L (ref 7–45)
BACTERIA SPEC AEROBE CULT: NORMAL
BACTERIA SPEC AEROBE CULT: NORMAL
BASOPHILS # BLD AUTO: 0.01 10*3/MM3 (ref 0–0.2)
BASOPHILS NFR BLD AUTO: 0.2 % (ref 0–2)
BILIRUB SERPL-MCNC: 0.3 MG/DL (ref 0.1–1)
BILIRUB UR QL STRIP: NEGATIVE
BUN BLD-MCNC: 49 MG/DL (ref 5–21)
BUN/CREAT SERPL: 25.9 (ref 7–25)
CALCIUM SPEC-SCNC: 9.2 MG/DL (ref 8.4–10.4)
CHLORIDE SERPL-SCNC: 102 MMOL/L (ref 98–110)
CLARITY UR: ABNORMAL
CO2 SERPL-SCNC: 29 MMOL/L (ref 24–31)
COLOR UR: YELLOW
CREAT BLD-MCNC: 1.89 MG/DL (ref 0.5–1.4)
DEPRECATED RDW RBC AUTO: 42.6 FL (ref 40–54)
EOSINOPHIL # BLD AUTO: 0.31 10*3/MM3 (ref 0–0.7)
EOSINOPHIL NFR BLD AUTO: 6.4 % (ref 0–4)
ERYTHROCYTE [DISTWIDTH] IN BLOOD BY AUTOMATED COUNT: 13.5 % (ref 12–15)
GFR SERPL CREATININE-BSD FRML MDRD: 36 ML/MIN/1.73
GLOBULIN UR ELPH-MCNC: 2.8 GM/DL
GLUCOSE BLD-MCNC: 156 MG/DL (ref 70–100)
GLUCOSE UR STRIP-MCNC: ABNORMAL MG/DL
HCT VFR BLD AUTO: 32.8 % (ref 40–52)
HGB BLD-MCNC: 11.3 G/DL (ref 14–18)
HGB UR QL STRIP.AUTO: NEGATIVE
IMM GRANULOCYTES # BLD: 0.03 10*3/MM3 (ref 0–0.03)
IMM GRANULOCYTES NFR BLD: 0.6 % (ref 0–5)
KETONES UR QL STRIP: NEGATIVE
LEUKOCYTE ESTERASE UR QL STRIP.AUTO: NEGATIVE
LYMPHOCYTES # BLD AUTO: 1.44 10*3/MM3 (ref 0.72–4.86)
LYMPHOCYTES NFR BLD AUTO: 29.9 % (ref 15–45)
MCH RBC QN AUTO: 29.8 PG (ref 28–32)
MCHC RBC AUTO-ENTMCNC: 34.5 G/DL (ref 33–36)
MCV RBC AUTO: 86.5 FL (ref 82–95)
MONOCYTES # BLD AUTO: 0.43 10*3/MM3 (ref 0.19–1.3)
MONOCYTES NFR BLD AUTO: 8.9 % (ref 4–12)
NEUTROPHILS # BLD AUTO: 2.59 10*3/MM3 (ref 1.87–8.4)
NEUTROPHILS NFR BLD AUTO: 54 % (ref 39–78)
NITRITE UR QL STRIP: NEGATIVE
NRBC BLD MANUAL-RTO: 0 /100 WBC (ref 0–0)
NT-PROBNP SERPL-MCNC: 1660 PG/ML (ref 0–900)
PH UR STRIP.AUTO: <=5 [PH] (ref 5–8)
PLATELET # BLD AUTO: 182 10*3/MM3 (ref 130–400)
PMV BLD AUTO: 11.7 FL (ref 6–12)
POTASSIUM BLD-SCNC: 3.8 MMOL/L (ref 3.5–5.3)
PROT SERPL-MCNC: 6.8 G/DL (ref 6.3–8.7)
PROT UR QL STRIP: NEGATIVE
RBC # BLD AUTO: 3.79 10*6/MM3 (ref 4.8–5.9)
SODIUM BLD-SCNC: 143 MMOL/L (ref 135–145)
SP GR UR STRIP: 1.02 (ref 1–1.03)
UROBILINOGEN UR QL STRIP: ABNORMAL
WBC NRBC COR # BLD: 4.81 10*3/MM3 (ref 4.8–10.8)

## 2017-12-30 PROCEDURE — 85025 COMPLETE CBC W/AUTO DIFF WBC: CPT | Performed by: PHYSICIAN ASSISTANT

## 2017-12-30 PROCEDURE — 99284 EMERGENCY DEPT VISIT MOD MDM: CPT

## 2017-12-30 PROCEDURE — 51798 US URINE CAPACITY MEASURE: CPT

## 2017-12-30 PROCEDURE — 96360 HYDRATION IV INFUSION INIT: CPT

## 2017-12-30 PROCEDURE — 80053 COMPREHEN METABOLIC PANEL: CPT | Performed by: PHYSICIAN ASSISTANT

## 2017-12-30 PROCEDURE — 83880 ASSAY OF NATRIURETIC PEPTIDE: CPT | Performed by: PHYSICIAN ASSISTANT

## 2017-12-30 PROCEDURE — 81003 URINALYSIS AUTO W/O SCOPE: CPT | Performed by: PHYSICIAN ASSISTANT

## 2017-12-30 PROCEDURE — 71020 HC CHEST PA AND LATERAL: CPT

## 2017-12-30 RX ORDER — PROMETHAZINE HYDROCHLORIDE AND CODEINE PHOSPHATE 6.25; 1 MG/5ML; MG/5ML
5 SYRUP ORAL EVERY 4 HOURS PRN
Qty: 473 ML | Refills: 0 | Status: ON HOLD | OUTPATIENT
Start: 2017-12-30 | End: 2018-01-17

## 2017-12-30 RX ADMIN — SODIUM CHLORIDE 1000 ML: 9 INJECTION, SOLUTION INTRAVENOUS at 09:37

## 2018-01-16 ENCOUNTER — APPOINTMENT (OUTPATIENT)
Dept: GENERAL RADIOLOGY | Facility: HOSPITAL | Age: 63
End: 2018-01-16

## 2018-01-16 ENCOUNTER — HOSPITAL ENCOUNTER (INPATIENT)
Facility: HOSPITAL | Age: 63
LOS: 2 days | Discharge: HOME | End: 2018-01-19
Attending: FAMILY MEDICINE | Admitting: FAMILY MEDICINE

## 2018-01-16 DIAGNOSIS — R07.9 CHEST PAIN, UNSPECIFIED TYPE: Primary | ICD-10-CM

## 2018-01-16 DIAGNOSIS — R06.00 DYSPNEA, UNSPECIFIED TYPE: ICD-10-CM

## 2018-01-16 LAB
ALBUMIN SERPL-MCNC: 4.1 G/DL (ref 3.5–5)
ALBUMIN/GLOB SERPL: 1.5 G/DL (ref 1.1–2.5)
ALP SERPL-CCNC: 81 U/L (ref 24–120)
ALT SERPL W P-5'-P-CCNC: 56 U/L (ref 0–54)
ANION GAP SERPL CALCULATED.3IONS-SCNC: 15 MMOL/L (ref 4–13)
APTT PPP: 24.7 SECONDS (ref 24.1–34.8)
ARTERIAL PATENCY WRIST A: POSITIVE
AST SERPL-CCNC: 49 U/L (ref 7–45)
ATMOSPHERIC PRESS: 768 MMHG
BASE EXCESS BLDA CALC-SCNC: 5.6 MMOL/L (ref 0–2)
BASOPHILS # BLD AUTO: 0.04 10*3/MM3 (ref 0–0.2)
BASOPHILS NFR BLD AUTO: 0.7 % (ref 0–2)
BDY SITE: ABNORMAL
BILIRUB SERPL-MCNC: 0.5 MG/DL (ref 0.1–1)
BODY TEMPERATURE: 37 C
BUN BLD-MCNC: 44 MG/DL (ref 5–21)
BUN/CREAT SERPL: 24 (ref 7–25)
CALCIUM SPEC-SCNC: 9.2 MG/DL (ref 8.4–10.4)
CHLORIDE SERPL-SCNC: 95 MMOL/L (ref 98–110)
CO2 SERPL-SCNC: 28 MMOL/L (ref 24–31)
CREAT BLD-MCNC: 1.83 MG/DL (ref 0.5–1.4)
DEPRECATED RDW RBC AUTO: 43.6 FL (ref 40–54)
EOSINOPHIL # BLD AUTO: 0.38 10*3/MM3 (ref 0–0.7)
EOSINOPHIL NFR BLD AUTO: 7.1 % (ref 0–4)
ERYTHROCYTE [DISTWIDTH] IN BLOOD BY AUTOMATED COUNT: 13.8 % (ref 12–15)
FLUAV AG NPH QL: NEGATIVE
FLUBV AG NPH QL IA: NEGATIVE
GFR SERPL CREATININE-BSD FRML MDRD: 38 ML/MIN/1.73
GLOBULIN UR ELPH-MCNC: 2.7 GM/DL
GLUCOSE BLD-MCNC: 331 MG/DL (ref 70–100)
HCO3 BLDA-SCNC: 30.2 MMOL/L (ref 20–26)
HCT VFR BLD AUTO: 36 % (ref 40–52)
HGB BLD-MCNC: 12.5 G/DL (ref 14–18)
HOROWITZ INDEX BLD+IHG-RTO: 21 %
IMM GRANULOCYTES # BLD: 0.01 10*3/MM3 (ref 0–0.03)
IMM GRANULOCYTES NFR BLD: 0.2 % (ref 0–5)
INR PPP: 0.85 (ref 0.91–1.09)
LYMPHOCYTES # BLD AUTO: 1.86 10*3/MM3 (ref 0.72–4.86)
LYMPHOCYTES NFR BLD AUTO: 34.5 % (ref 15–45)
Lab: ABNORMAL
MCH RBC QN AUTO: 30.3 PG (ref 28–32)
MCHC RBC AUTO-ENTMCNC: 34.7 G/DL (ref 33–36)
MCV RBC AUTO: 87.4 FL (ref 82–95)
MODALITY: ABNORMAL
MONOCYTES # BLD AUTO: 0.83 10*3/MM3 (ref 0.19–1.3)
MONOCYTES NFR BLD AUTO: 15.4 % (ref 4–12)
NEUTROPHILS # BLD AUTO: 2.27 10*3/MM3 (ref 1.87–8.4)
NEUTROPHILS NFR BLD AUTO: 42.1 % (ref 39–78)
NRBC BLD MANUAL-RTO: 0 /100 WBC (ref 0–0)
NT-PROBNP SERPL-MCNC: 263 PG/ML (ref 0–900)
PCO2 BLDA: 42.8 MM HG (ref 35–45)
PH BLDA: 7.46 PH UNITS (ref 7.35–7.45)
PLATELET # BLD AUTO: 206 10*3/MM3 (ref 130–400)
PMV BLD AUTO: 11.9 FL (ref 6–12)
PO2 BLDA: 58.6 MM HG (ref 83–108)
POTASSIUM BLD-SCNC: 4 MMOL/L (ref 3.5–5.3)
PROT SERPL-MCNC: 6.8 G/DL (ref 6.3–8.7)
PROTHROMBIN TIME: 11.9 SECONDS (ref 11.9–14.6)
RBC # BLD AUTO: 4.12 10*6/MM3 (ref 4.8–5.9)
SAO2 % BLDCOA: 92.2 % (ref 94–99)
SODIUM BLD-SCNC: 138 MMOL/L (ref 135–145)
TROPONIN I SERPL-MCNC: <0.012 NG/ML (ref 0–0.03)
VENTILATOR MODE: ABNORMAL
WBC NRBC COR # BLD: 5.39 10*3/MM3 (ref 4.8–10.8)

## 2018-01-16 PROCEDURE — 85025 COMPLETE CBC W/AUTO DIFF WBC: CPT | Performed by: FAMILY MEDICINE

## 2018-01-16 PROCEDURE — 82803 BLOOD GASES ANY COMBINATION: CPT

## 2018-01-16 PROCEDURE — 85379 FIBRIN DEGRADATION QUANT: CPT | Performed by: PHYSICIAN ASSISTANT

## 2018-01-16 PROCEDURE — 93010 ELECTROCARDIOGRAM REPORT: CPT | Performed by: INTERNAL MEDICINE

## 2018-01-16 PROCEDURE — 71045 X-RAY EXAM CHEST 1 VIEW: CPT

## 2018-01-16 PROCEDURE — 85610 PROTHROMBIN TIME: CPT | Performed by: PHYSICIAN ASSISTANT

## 2018-01-16 PROCEDURE — 36600 WITHDRAWAL OF ARTERIAL BLOOD: CPT

## 2018-01-16 PROCEDURE — 87804 INFLUENZA ASSAY W/OPTIC: CPT | Performed by: PHYSICIAN ASSISTANT

## 2018-01-16 PROCEDURE — 80053 COMPREHEN METABOLIC PANEL: CPT | Performed by: FAMILY MEDICINE

## 2018-01-16 PROCEDURE — 99285 EMERGENCY DEPT VISIT HI MDM: CPT

## 2018-01-16 PROCEDURE — 83880 ASSAY OF NATRIURETIC PEPTIDE: CPT | Performed by: PHYSICIAN ASSISTANT

## 2018-01-16 PROCEDURE — 84484 ASSAY OF TROPONIN QUANT: CPT | Performed by: FAMILY MEDICINE

## 2018-01-16 PROCEDURE — 93005 ELECTROCARDIOGRAM TRACING: CPT | Performed by: EMERGENCY MEDICINE

## 2018-01-16 PROCEDURE — 85730 THROMBOPLASTIN TIME PARTIAL: CPT | Performed by: PHYSICIAN ASSISTANT

## 2018-01-16 RX ORDER — SODIUM CHLORIDE 0.9 % (FLUSH) 0.9 %
10 SYRINGE (ML) INJECTION AS NEEDED
Status: DISCONTINUED | OUTPATIENT
Start: 2018-01-16 | End: 2018-01-19 | Stop reason: HOSPADM

## 2018-01-16 RX ORDER — ASPIRIN 81 MG/1
324 TABLET, CHEWABLE ORAL ONCE
Status: COMPLETED | OUTPATIENT
Start: 2018-01-16 | End: 2018-01-16

## 2018-01-16 RX ADMIN — ASPIRIN 81 MG 324 MG: 81 TABLET ORAL at 23:42

## 2018-01-17 ENCOUNTER — APPOINTMENT (OUTPATIENT)
Dept: GENERAL RADIOLOGY | Facility: HOSPITAL | Age: 63
End: 2018-01-17

## 2018-01-17 ENCOUNTER — APPOINTMENT (OUTPATIENT)
Dept: CT IMAGING | Facility: HOSPITAL | Age: 63
End: 2018-01-17

## 2018-01-17 ENCOUNTER — APPOINTMENT (OUTPATIENT)
Dept: CARDIOLOGY | Facility: HOSPITAL | Age: 63
End: 2018-01-17
Attending: FAMILY MEDICINE

## 2018-01-17 ENCOUNTER — APPOINTMENT (OUTPATIENT)
Dept: ULTRASOUND IMAGING | Facility: HOSPITAL | Age: 63
End: 2018-01-17

## 2018-01-17 PROBLEM — R07.9 CHEST PAIN: Status: ACTIVE | Noted: 2018-01-17

## 2018-01-17 LAB
ALBUMIN SERPL-MCNC: 4 G/DL (ref 3.5–5)
ALBUMIN/GLOB SERPL: 1.5 G/DL (ref 1.1–2.5)
ALP SERPL-CCNC: 65 U/L (ref 24–120)
ALT SERPL W P-5'-P-CCNC: 56 U/L (ref 0–54)
ANION GAP SERPL CALCULATED.3IONS-SCNC: 13 MMOL/L (ref 4–13)
ANION GAP SERPL CALCULATED.3IONS-SCNC: 14 MMOL/L (ref 4–13)
ARTERIAL PATENCY WRIST A: POSITIVE
ARTICHOKE IGE QN: 52 MG/DL (ref 0–99)
AST SERPL-CCNC: 44 U/L (ref 7–45)
ATMOSPHERIC PRESS: 768 MMHG
BASE EXCESS BLDA CALC-SCNC: 4.8 MMOL/L (ref 0–2)
BASOPHILS # BLD AUTO: 0.03 10*3/MM3 (ref 0–0.2)
BASOPHILS NFR BLD AUTO: 0.6 % (ref 0–2)
BDY SITE: ABNORMAL
BH CV ECHO MEAS - AO MAX PG (FULL): 7.6 MMHG
BH CV ECHO MEAS - AO MAX PG: 9.6 MMHG
BH CV ECHO MEAS - AO MEAN PG (FULL): 6 MMHG
BH CV ECHO MEAS - AO MEAN PG: 7 MMHG
BH CV ECHO MEAS - AO ROOT AREA (BSA CORRECTED): 1.4
BH CV ECHO MEAS - AO ROOT AREA: 10.8 CM^2
BH CV ECHO MEAS - AO ROOT DIAM: 3.7 CM
BH CV ECHO MEAS - AO V2 MAX: 155 CM/SEC
BH CV ECHO MEAS - AO V2 MEAN: 124 CM/SEC
BH CV ECHO MEAS - AO V2 VTI: 34.8 CM
BH CV ECHO MEAS - AVA(I,A): 1.7 CM^2
BH CV ECHO MEAS - AVA(I,D): 1.7 CM^2
BH CV ECHO MEAS - AVA(V,A): 1.7 CM^2
BH CV ECHO MEAS - AVA(V,D): 1.7 CM^2
BH CV ECHO MEAS - BSA(HAYCOCK): 2.8 M^2
BH CV ECHO MEAS - BSA: 2.6 M^2
BH CV ECHO MEAS - BZI_BMI: 44.8 KILOGRAMS/M^2
BH CV ECHO MEAS - BZI_METRIC_HEIGHT: 182.9 CM
BH CV ECHO MEAS - BZI_METRIC_WEIGHT: 149.7 KG
BH CV ECHO MEAS - CONTRAST EF 4CH: 56.9 ML/M^2
BH CV ECHO MEAS - EDV(CUBED): 57.1 ML
BH CV ECHO MEAS - EDV(MOD-SP4): 39.2 ML
BH CV ECHO MEAS - EDV(TEICH): 63.9 ML
BH CV ECHO MEAS - EF(CUBED): 57.3 %
BH CV ECHO MEAS - EF(MOD-SP4): 56.9 %
BH CV ECHO MEAS - EF(TEICH): 49.6 %
BH CV ECHO MEAS - ESV(CUBED): 24.4 ML
BH CV ECHO MEAS - ESV(MOD-SP4): 16.9 ML
BH CV ECHO MEAS - ESV(TEICH): 32.2 ML
BH CV ECHO MEAS - FS: 24.7 %
BH CV ECHO MEAS - IVS/LVPW: 0.96
BH CV ECHO MEAS - IVSD: 1.6 CM
BH CV ECHO MEAS - LA DIMENSION: 3.9 CM
BH CV ECHO MEAS - LA/AO: 1.1
BH CV ECHO MEAS - LV DIASTOLIC VOL/BSA (35-75): 14.9 ML/M^2
BH CV ECHO MEAS - LV MASS(C)D: 247.1 GRAMS
BH CV ECHO MEAS - LV MASS(C)DI: 93.8 GRAMS/M^2
BH CV ECHO MEAS - LV MAX PG: 2 MMHG
BH CV ECHO MEAS - LV MEAN PG: 1 MMHG
BH CV ECHO MEAS - LV SYSTOLIC VOL/BSA (12-30): 6.4 ML/M^2
BH CV ECHO MEAS - LV V1 MAX: 70.2 CM/SEC
BH CV ECHO MEAS - LV V1 MEAN: 54.5 CM/SEC
BH CV ECHO MEAS - LV V1 VTI: 15.3 CM
BH CV ECHO MEAS - LVIDD: 3.9 CM
BH CV ECHO MEAS - LVIDS: 2.9 CM
BH CV ECHO MEAS - LVLD AP4: 6.4 CM
BH CV ECHO MEAS - LVLS AP4: 6.1 CM
BH CV ECHO MEAS - LVOT AREA (M): 3.8 CM^2
BH CV ECHO MEAS - LVOT AREA: 3.8 CM^2
BH CV ECHO MEAS - LVOT DIAM: 2.2 CM
BH CV ECHO MEAS - LVPWD: 1.6 CM
BH CV ECHO MEAS - SI(AO): 142 ML/M^2
BH CV ECHO MEAS - SI(CUBED): 12.4 ML/M^2
BH CV ECHO MEAS - SI(LVOT): 22.1 ML/M^2
BH CV ECHO MEAS - SI(MOD-SP4): 8.5 ML/M^2
BH CV ECHO MEAS - SI(TEICH): 12 ML/M^2
BH CV ECHO MEAS - SV(AO): 374.2 ML
BH CV ECHO MEAS - SV(CUBED): 32.7 ML
BH CV ECHO MEAS - SV(LVOT): 58.2 ML
BH CV ECHO MEAS - SV(MOD-SP4): 22.3 ML
BH CV ECHO MEAS - SV(TEICH): 31.7 ML
BILIRUB SERPL-MCNC: 0.5 MG/DL (ref 0.1–1)
BODY TEMPERATURE: 37 C
BUN BLD-MCNC: 39 MG/DL (ref 5–21)
BUN BLD-MCNC: 43 MG/DL (ref 5–21)
BUN/CREAT SERPL: 21.7 (ref 7–25)
BUN/CREAT SERPL: 23.1 (ref 7–25)
CALCIUM SPEC-SCNC: 8.9 MG/DL (ref 8.4–10.4)
CALCIUM SPEC-SCNC: 9.1 MG/DL (ref 8.4–10.4)
CHLORIDE SERPL-SCNC: 98 MMOL/L (ref 98–110)
CHLORIDE SERPL-SCNC: 98 MMOL/L (ref 98–110)
CHOLEST SERPL-MCNC: 184 MG/DL (ref 130–200)
CO2 SERPL-SCNC: 28 MMOL/L (ref 24–31)
CO2 SERPL-SCNC: 31 MMOL/L (ref 24–31)
CREAT BLD-MCNC: 1.8 MG/DL (ref 0.5–1.4)
CREAT BLD-MCNC: 1.86 MG/DL (ref 0.5–1.4)
D DIMER PPP FEU-MCNC: 0.41 MG/L (FEU) (ref 0–0.5)
DEPRECATED RDW RBC AUTO: 43.5 FL (ref 40–54)
EOSINOPHIL # BLD AUTO: 0.45 10*3/MM3 (ref 0–0.7)
EOSINOPHIL NFR BLD AUTO: 8.7 % (ref 0–4)
ERYTHROCYTE [DISTWIDTH] IN BLOOD BY AUTOMATED COUNT: 13.7 % (ref 12–15)
FLUAV AG NPH QL: NEGATIVE
FLUBV AG NPH QL IA: NEGATIVE
GAS FLOW AIRWAY: 2 LPM
GFR SERPL CREATININE-BSD FRML MDRD: 37 ML/MIN/1.73
GFR SERPL CREATININE-BSD FRML MDRD: 39 ML/MIN/1.73
GLOBULIN UR ELPH-MCNC: 2.7 GM/DL
GLUCOSE BLD-MCNC: 133 MG/DL (ref 70–100)
GLUCOSE BLD-MCNC: 280 MG/DL (ref 70–100)
GLUCOSE BLDC GLUCOMTR-MCNC: 175 MG/DL (ref 70–130)
GLUCOSE BLDC GLUCOMTR-MCNC: 234 MG/DL (ref 70–130)
GLUCOSE BLDC GLUCOMTR-MCNC: 291 MG/DL (ref 70–130)
GLUCOSE BLDC GLUCOMTR-MCNC: 326 MG/DL (ref 70–130)
GLUCOSE BLDC GLUCOMTR-MCNC: 340 MG/DL (ref 70–130)
HAV IGM SERPL QL IA: NEGATIVE
HBA1C MFR BLD: 9.3 %
HBV CORE IGM SERPL QL IA: NEGATIVE
HBV SURFACE AG SERPL QL IA: NEGATIVE
HCO3 BLDA-SCNC: 30.2 MMOL/L (ref 20–26)
HCT VFR BLD AUTO: 36 % (ref 40–52)
HCV AB SER DONR QL: NEGATIVE
HCV S/C RATIO: 0.07 (ref 0–0.99)
HDLC SERPL-MCNC: 26 MG/DL
HGB BLD-MCNC: 12.3 G/DL (ref 14–18)
HOLD SPECIMEN: NORMAL
HOLD SPECIMEN: NORMAL
IMM GRANULOCYTES # BLD: 0.01 10*3/MM3 (ref 0–0.03)
IMM GRANULOCYTES NFR BLD: 0.2 % (ref 0–5)
LDLC/HDLC SERPL: ABNORMAL {RATIO}
LEFT ATRIUM VOLUME INDEX: 17.2 ML/M2
LEFT ATRIUM VOLUME: 45.5 CM3
LYMPHOCYTES # BLD AUTO: 1.92 10*3/MM3 (ref 0.72–4.86)
LYMPHOCYTES NFR BLD AUTO: 36.9 % (ref 15–45)
Lab: ABNORMAL
MAGNESIUM SERPL-MCNC: 1.8 MG/DL (ref 1.4–2.2)
MAXIMAL PREDICTED HEART RATE: 159 BPM
MCH RBC QN AUTO: 29.9 PG (ref 28–32)
MCHC RBC AUTO-ENTMCNC: 34.2 G/DL (ref 33–36)
MCV RBC AUTO: 87.6 FL (ref 82–95)
MODALITY: ABNORMAL
MONOCYTES # BLD AUTO: 0.84 10*3/MM3 (ref 0.19–1.3)
MONOCYTES NFR BLD AUTO: 16.2 % (ref 4–12)
NEUTROPHILS # BLD AUTO: 1.95 10*3/MM3 (ref 1.87–8.4)
NEUTROPHILS NFR BLD AUTO: 37.4 % (ref 39–78)
NRBC BLD MANUAL-RTO: 0 /100 WBC (ref 0–0)
PCO2 BLDA: 47 MM HG (ref 35–45)
PH BLDA: 7.42 PH UNITS (ref 7.35–7.45)
PHOSPHATE SERPL-MCNC: 5.3 MG/DL (ref 2.5–4.5)
PLATELET # BLD AUTO: 192 10*3/MM3 (ref 130–400)
PMV BLD AUTO: 11.9 FL (ref 6–12)
PO2 BLDA: 71.2 MM HG (ref 83–108)
POTASSIUM BLD-SCNC: 3.5 MMOL/L (ref 3.5–5.3)
POTASSIUM BLD-SCNC: 4.2 MMOL/L (ref 3.5–5.3)
PROT SERPL-MCNC: 6.7 G/DL (ref 6.3–8.7)
RBC # BLD AUTO: 4.11 10*6/MM3 (ref 4.8–5.9)
SAO2 % BLDCOA: 95.2 % (ref 94–99)
SODIUM BLD-SCNC: 140 MMOL/L (ref 135–145)
SODIUM BLD-SCNC: 142 MMOL/L (ref 135–145)
STRESS TARGET HR: 135 BPM
TRIGL SERPL-MCNC: 645 MG/DL (ref 0–149)
TROPONIN I SERPL-MCNC: <0.012 NG/ML (ref 0–0.03)
VENTILATOR MODE: ABNORMAL
WBC NRBC COR # BLD: 5.2 10*3/MM3 (ref 4.8–10.8)
WHOLE BLOOD HOLD SPECIMEN: NORMAL
WHOLE BLOOD HOLD SPECIMEN: NORMAL

## 2018-01-17 PROCEDURE — 76705 ECHO EXAM OF ABDOMEN: CPT

## 2018-01-17 PROCEDURE — 36600 WITHDRAWAL OF ARTERIAL BLOOD: CPT

## 2018-01-17 PROCEDURE — 93005 ELECTROCARDIOGRAM TRACING: CPT | Performed by: FAMILY MEDICINE

## 2018-01-17 PROCEDURE — 94799 UNLISTED PULMONARY SVC/PX: CPT

## 2018-01-17 PROCEDURE — 84100 ASSAY OF PHOSPHORUS: CPT | Performed by: FAMILY MEDICINE

## 2018-01-17 PROCEDURE — G0008 ADMIN INFLUENZA VIRUS VAC: HCPCS | Performed by: FAMILY MEDICINE

## 2018-01-17 PROCEDURE — 71045 X-RAY EXAM CHEST 1 VIEW: CPT

## 2018-01-17 PROCEDURE — 82962 GLUCOSE BLOOD TEST: CPT

## 2018-01-17 PROCEDURE — 93306 TTE W/DOPPLER COMPLETE: CPT | Performed by: INTERNAL MEDICINE

## 2018-01-17 PROCEDURE — 93306 TTE W/DOPPLER COMPLETE: CPT

## 2018-01-17 PROCEDURE — 93010 ELECTROCARDIOGRAM REPORT: CPT | Performed by: INTERNAL MEDICINE

## 2018-01-17 PROCEDURE — 84484 ASSAY OF TROPONIN QUANT: CPT | Performed by: FAMILY MEDICINE

## 2018-01-17 PROCEDURE — 25010000002 ONDANSETRON PER 1 MG: Performed by: FAMILY MEDICINE

## 2018-01-17 PROCEDURE — 25010000002 HEPARIN (PORCINE) PER 1000 UNITS: Performed by: FAMILY MEDICINE

## 2018-01-17 PROCEDURE — 80053 COMPREHEN METABOLIC PANEL: CPT | Performed by: FAMILY MEDICINE

## 2018-01-17 PROCEDURE — 82803 BLOOD GASES ANY COMBINATION: CPT

## 2018-01-17 PROCEDURE — 94640 AIRWAY INHALATION TREATMENT: CPT

## 2018-01-17 PROCEDURE — 71046 X-RAY EXAM CHEST 2 VIEWS: CPT

## 2018-01-17 PROCEDURE — 63710000001 INSULIN LISPRO (HUMAN) PER 5 UNITS: Performed by: FAMILY MEDICINE

## 2018-01-17 PROCEDURE — 85025 COMPLETE CBC W/AUTO DIFF WBC: CPT | Performed by: FAMILY MEDICINE

## 2018-01-17 PROCEDURE — 80074 ACUTE HEPATITIS PANEL: CPT | Performed by: FAMILY MEDICINE

## 2018-01-17 PROCEDURE — 83036 HEMOGLOBIN GLYCOSYLATED A1C: CPT | Performed by: NURSE PRACTITIONER

## 2018-01-17 PROCEDURE — 25010000002 MORPHINE SULFATE (PF) 2 MG/ML SOLUTION

## 2018-01-17 PROCEDURE — 25010000002 INFLUENZA VAC SUBUNIT QUAD 0.5 ML SUSPENSION PREFILLED SYRINGE: Performed by: FAMILY MEDICINE

## 2018-01-17 PROCEDURE — 87804 INFLUENZA ASSAY W/OPTIC: CPT | Performed by: FAMILY MEDICINE

## 2018-01-17 PROCEDURE — 80048 BASIC METABOLIC PNL TOTAL CA: CPT | Performed by: NURSE PRACTITIONER

## 2018-01-17 PROCEDURE — 25010000002 PERFLUTREN 6.52 MG/ML SUSPENSION: Performed by: FAMILY MEDICINE

## 2018-01-17 PROCEDURE — 25010000002 HYDROMORPHONE PER 4 MG

## 2018-01-17 PROCEDURE — 36415 COLL VENOUS BLD VENIPUNCTURE: CPT | Performed by: FAMILY MEDICINE

## 2018-01-17 PROCEDURE — 25010000002 MORPHINE SULFATE (PF) 2 MG/ML SOLUTION: Performed by: FAMILY MEDICINE

## 2018-01-17 PROCEDURE — 90661 CCIIV3 VAC ABX FR 0.5 ML IM: CPT | Performed by: FAMILY MEDICINE

## 2018-01-17 PROCEDURE — G0378 HOSPITAL OBSERVATION PER HR: HCPCS

## 2018-01-17 PROCEDURE — 63710000001 INSULIN REGULAR HUMAN PER 5 UNITS: Performed by: PHYSICIAN ASSISTANT

## 2018-01-17 PROCEDURE — 80061 LIPID PANEL: CPT | Performed by: FAMILY MEDICINE

## 2018-01-17 PROCEDURE — 83735 ASSAY OF MAGNESIUM: CPT | Performed by: FAMILY MEDICINE

## 2018-01-17 RX ORDER — MORPHINE SULFATE 2 MG/ML
INJECTION, SOLUTION INTRAMUSCULAR; INTRAVENOUS
Status: COMPLETED
Start: 2018-01-17 | End: 2018-01-17

## 2018-01-17 RX ORDER — ONDANSETRON 2 MG/ML
4 INJECTION INTRAMUSCULAR; INTRAVENOUS ONCE
Status: DISCONTINUED | OUTPATIENT
Start: 2018-01-17 | End: 2018-01-19 | Stop reason: HOSPADM

## 2018-01-17 RX ORDER — TRAZODONE HYDROCHLORIDE 50 MG/1
50 TABLET ORAL NIGHTLY PRN
COMMUNITY
End: 2019-01-30

## 2018-01-17 RX ORDER — POTASSIUM CHLORIDE 750 MG/1
10 TABLET, FILM COATED, EXTENDED RELEASE ORAL DAILY
COMMUNITY
End: 2019-01-30

## 2018-01-17 RX ORDER — NALOXONE HCL 0.4 MG/ML
0.4 VIAL (ML) INJECTION
Status: DISCONTINUED | OUTPATIENT
Start: 2018-01-17 | End: 2018-01-19 | Stop reason: HOSPADM

## 2018-01-17 RX ORDER — ESCITALOPRAM OXALATE 10 MG/1
10 TABLET ORAL DAILY
Status: DISCONTINUED | OUTPATIENT
Start: 2018-01-17 | End: 2018-01-19 | Stop reason: HOSPADM

## 2018-01-17 RX ORDER — NEBIVOLOL 5 MG/1
5 TABLET ORAL DAILY
Status: ON HOLD | COMMUNITY
End: 2019-03-04 | Stop reason: ALTCHOICE

## 2018-01-17 RX ORDER — BUMETANIDE 2 MG/1
2 TABLET ORAL DAILY
Status: DISCONTINUED | OUTPATIENT
Start: 2018-01-17 | End: 2018-01-17

## 2018-01-17 RX ORDER — ONDANSETRON 2 MG/ML
INJECTION INTRAMUSCULAR; INTRAVENOUS
Status: DISPENSED
Start: 2018-01-17 | End: 2018-01-17

## 2018-01-17 RX ORDER — COLCHICINE 0.6 MG/1
0.6 TABLET ORAL 2 TIMES DAILY PRN
COMMUNITY
End: 2018-01-19 | Stop reason: HOSPADM

## 2018-01-17 RX ORDER — ESCITALOPRAM OXALATE 10 MG/1
10 TABLET ORAL DAILY
Status: ON HOLD | COMMUNITY
End: 2019-03-04 | Stop reason: ALTCHOICE

## 2018-01-17 RX ORDER — MORPHINE SULFATE 2 MG/ML
2 INJECTION, SOLUTION INTRAMUSCULAR; INTRAVENOUS ONCE
Status: COMPLETED | OUTPATIENT
Start: 2018-01-17 | End: 2018-01-17

## 2018-01-17 RX ORDER — MORPHINE SULFATE 2 MG/ML
1 INJECTION, SOLUTION INTRAMUSCULAR; INTRAVENOUS EVERY 4 HOURS PRN
Status: DISCONTINUED | OUTPATIENT
Start: 2018-01-17 | End: 2018-01-19 | Stop reason: HOSPADM

## 2018-01-17 RX ORDER — ISOSORBIDE MONONITRATE 30 MG/1
30 TABLET, EXTENDED RELEASE ORAL DAILY
Status: DISCONTINUED | OUTPATIENT
Start: 2018-01-17 | End: 2018-01-19 | Stop reason: HOSPADM

## 2018-01-17 RX ORDER — ERGOCALCIFEROL 1.25 MG/1
50000 CAPSULE ORAL WEEKLY
COMMUNITY
End: 2019-01-30

## 2018-01-17 RX ORDER — ACETAMINOPHEN 325 MG/1
650 TABLET ORAL EVERY 6 HOURS PRN
Status: DISCONTINUED | OUTPATIENT
Start: 2018-01-17 | End: 2018-01-19 | Stop reason: HOSPADM

## 2018-01-17 RX ORDER — METOLAZONE 2.5 MG/1
2.5 TABLET ORAL DAILY
Status: DISCONTINUED | OUTPATIENT
Start: 2018-01-17 | End: 2018-01-19 | Stop reason: HOSPADM

## 2018-01-17 RX ORDER — FAMOTIDINE 20 MG/1
40 TABLET, FILM COATED ORAL DAILY
Status: DISCONTINUED | OUTPATIENT
Start: 2018-01-17 | End: 2018-01-19 | Stop reason: HOSPADM

## 2018-01-17 RX ORDER — ONDANSETRON 2 MG/ML
4 INJECTION INTRAMUSCULAR; INTRAVENOUS ONCE
Status: COMPLETED | OUTPATIENT
Start: 2018-01-17 | End: 2018-01-17

## 2018-01-17 RX ORDER — ASPIRIN 81 MG/1
81 TABLET ORAL DAILY
Status: DISCONTINUED | OUTPATIENT
Start: 2018-01-17 | End: 2018-01-19 | Stop reason: HOSPADM

## 2018-01-17 RX ORDER — OXYCODONE AND ACETAMINOPHEN 10; 325 MG/1; MG/1
1 TABLET ORAL 2 TIMES DAILY PRN
Status: ON HOLD | COMMUNITY
End: 2019-03-06 | Stop reason: SDUPTHER

## 2018-01-17 RX ORDER — ROSUVASTATIN CALCIUM 40 MG/1
40 TABLET, COATED ORAL NIGHTLY
COMMUNITY
End: 2021-01-18

## 2018-01-17 RX ORDER — NITROGLYCERIN 0.4 MG/1
0.4 TABLET SUBLINGUAL
Status: DISCONTINUED | OUTPATIENT
Start: 2018-01-17 | End: 2018-01-17

## 2018-01-17 RX ORDER — ONDANSETRON 2 MG/ML
4 INJECTION INTRAMUSCULAR; INTRAVENOUS EVERY 6 HOURS PRN
Status: DISCONTINUED | OUTPATIENT
Start: 2018-01-17 | End: 2018-01-19 | Stop reason: HOSPADM

## 2018-01-17 RX ORDER — IRBESARTAN 300 MG/1
300 TABLET ORAL DAILY
COMMUNITY
End: 2019-01-30 | Stop reason: SINTOL

## 2018-01-17 RX ORDER — SODIUM CHLORIDE 9 MG/ML
50 INJECTION, SOLUTION INTRAVENOUS CONTINUOUS
Status: DISCONTINUED | OUTPATIENT
Start: 2018-01-17 | End: 2018-01-17

## 2018-01-17 RX ORDER — AMITRIPTYLINE HYDROCHLORIDE 50 MG/1
50 TABLET, FILM COATED ORAL NIGHTLY
Status: ON HOLD | COMMUNITY
End: 2019-03-04 | Stop reason: ALTCHOICE

## 2018-01-17 RX ORDER — HEPARIN SODIUM 5000 [USP'U]/ML
5000 INJECTION, SOLUTION INTRAVENOUS; SUBCUTANEOUS EVERY 12 HOURS SCHEDULED
Status: DISCONTINUED | OUTPATIENT
Start: 2018-01-17 | End: 2018-01-19 | Stop reason: HOSPADM

## 2018-01-17 RX ORDER — IRBESARTAN 150 MG/1
300 TABLET ORAL
Status: DISCONTINUED | OUTPATIENT
Start: 2018-01-17 | End: 2018-01-19 | Stop reason: HOSPADM

## 2018-01-17 RX ORDER — BUMETANIDE 2 MG/1
3 TABLET ORAL DAILY
COMMUNITY
End: 2018-01-19 | Stop reason: HOSPADM

## 2018-01-17 RX ORDER — NICOTINE POLACRILEX 4 MG
15 LOZENGE BUCCAL
Status: DISCONTINUED | OUTPATIENT
Start: 2018-01-17 | End: 2018-01-19 | Stop reason: HOSPADM

## 2018-01-17 RX ORDER — ROSUVASTATIN CALCIUM 20 MG/1
40 TABLET, COATED ORAL DAILY
Status: DISCONTINUED | OUTPATIENT
Start: 2018-01-17 | End: 2018-01-19 | Stop reason: HOSPADM

## 2018-01-17 RX ORDER — ALLOPURINOL 100 MG/1
100 TABLET ORAL DAILY
Status: DISCONTINUED | OUTPATIENT
Start: 2018-01-17 | End: 2018-01-19 | Stop reason: HOSPADM

## 2018-01-17 RX ORDER — LABETALOL HYDROCHLORIDE 5 MG/ML
10 INJECTION, SOLUTION INTRAVENOUS ONCE
Status: DISCONTINUED | OUTPATIENT
Start: 2018-01-17 | End: 2018-01-19 | Stop reason: HOSPADM

## 2018-01-17 RX ORDER — PANTOPRAZOLE SODIUM 40 MG/1
40 TABLET, DELAYED RELEASE ORAL DAILY
Status: DISCONTINUED | OUTPATIENT
Start: 2018-01-17 | End: 2018-01-19 | Stop reason: HOSPADM

## 2018-01-17 RX ORDER — SODIUM CHLORIDE 0.9 % (FLUSH) 0.9 %
1-10 SYRINGE (ML) INJECTION AS NEEDED
Status: DISCONTINUED | OUTPATIENT
Start: 2018-01-17 | End: 2018-01-19 | Stop reason: HOSPADM

## 2018-01-17 RX ORDER — DEXTROSE MONOHYDRATE 25 G/50ML
25 INJECTION, SOLUTION INTRAVENOUS
Status: DISCONTINUED | OUTPATIENT
Start: 2018-01-17 | End: 2018-01-19 | Stop reason: HOSPADM

## 2018-01-17 RX ORDER — NEBIVOLOL 5 MG/1
5 TABLET ORAL
Status: DISCONTINUED | OUTPATIENT
Start: 2018-01-17 | End: 2018-01-19 | Stop reason: HOSPADM

## 2018-01-17 RX ORDER — ONDANSETRON 8 MG/1
8 TABLET, ORALLY DISINTEGRATING ORAL EVERY 6 HOURS PRN
COMMUNITY
End: 2020-01-18

## 2018-01-17 RX ADMIN — INSULIN LISPRO 10 UNITS: 100 INJECTION, SOLUTION INTRAVENOUS; SUBCUTANEOUS at 18:04

## 2018-01-17 RX ADMIN — PANTOPRAZOLE SODIUM 40 MG: 40 TABLET, DELAYED RELEASE ORAL at 10:27

## 2018-01-17 RX ADMIN — NEBIVOLOL HYDROCHLORIDE 5 MG: 5 TABLET ORAL at 10:26

## 2018-01-17 RX ADMIN — ACETAMINOPHEN 650 MG: 325 TABLET, FILM COATED ORAL at 12:01

## 2018-01-17 RX ADMIN — ONDANSETRON HYDROCHLORIDE 4 MG: 2 SOLUTION INTRAMUSCULAR; INTRAVENOUS at 18:15

## 2018-01-17 RX ADMIN — MORPHINE SULFATE: 2 INJECTION, SOLUTION INTRAMUSCULAR; INTRAVENOUS at 08:51

## 2018-01-17 RX ADMIN — MORPHINE SULFATE 2 MG: 2 INJECTION, SOLUTION INTRAMUSCULAR; INTRAVENOUS at 01:40

## 2018-01-17 RX ADMIN — MORPHINE SULFATE 1 MG: 2 INJECTION, SOLUTION INTRAMUSCULAR; INTRAVENOUS at 08:51

## 2018-01-17 RX ADMIN — Medication 81 MG: at 10:26

## 2018-01-17 RX ADMIN — INSULIN LISPRO 10 UNITS: 100 INJECTION, SOLUTION INTRAVENOUS; SUBCUTANEOUS at 20:55

## 2018-01-17 RX ADMIN — ROSUVASTATIN 40 MG: 20 TABLET, FILM COATED ORAL at 10:28

## 2018-01-17 RX ADMIN — Medication 1 MG: at 02:51

## 2018-01-17 RX ADMIN — INSULIN LISPRO 8 UNITS: 100 INJECTION, SOLUTION INTRAVENOUS; SUBCUTANEOUS at 13:03

## 2018-01-17 RX ADMIN — SODIUM CHLORIDE 1000 ML: 9 INJECTION, SOLUTION INTRAVENOUS at 01:40

## 2018-01-17 RX ADMIN — ALLOPURINOL 100 MG: 100 TABLET ORAL at 10:27

## 2018-01-17 RX ADMIN — SODIUM CHLORIDE 50 ML/HR: 9 INJECTION, SOLUTION INTRAVENOUS at 06:09

## 2018-01-17 RX ADMIN — MORPHINE SULFATE 1 MG: 2 INJECTION, SOLUTION INTRAMUSCULAR; INTRAVENOUS at 18:05

## 2018-01-17 RX ADMIN — HYDROMORPHONE HYDROCHLORIDE 1 MG: 1 INJECTION, SOLUTION INTRAMUSCULAR; INTRAVENOUS; SUBCUTANEOUS at 02:51

## 2018-01-17 RX ADMIN — INSULIN LISPRO 3 UNITS: 100 INJECTION, SOLUTION INTRAVENOUS; SUBCUTANEOUS at 08:49

## 2018-01-17 RX ADMIN — BUMETANIDE 2 MG: 2 TABLET ORAL at 10:26

## 2018-01-17 RX ADMIN — FAMOTIDINE 40 MG: 20 TABLET ORAL at 10:26

## 2018-01-17 RX ADMIN — HEPARIN SODIUM 5000 UNITS: 5000 INJECTION, SOLUTION INTRAVENOUS; SUBCUTANEOUS at 10:27

## 2018-01-17 RX ADMIN — HEPARIN SODIUM 5000 UNITS: 5000 INJECTION, SOLUTION INTRAVENOUS; SUBCUTANEOUS at 20:55

## 2018-01-17 RX ADMIN — INSULIN HUMAN 10 UNITS: 100 INJECTION, SOLUTION PARENTERAL at 00:19

## 2018-01-17 RX ADMIN — ACETAMINOPHEN 650 MG: 325 TABLET, FILM COATED ORAL at 22:58

## 2018-01-17 RX ADMIN — ISOSORBIDE MONONITRATE 30 MG: 30 TABLET, EXTENDED RELEASE ORAL at 10:26

## 2018-01-17 RX ADMIN — PERFLUTREN 8.48 MG: 6.52 INJECTION, SUSPENSION INTRAVENOUS at 09:29

## 2018-01-17 RX ADMIN — ONDANSETRON HYDROCHLORIDE 4 MG: 2 SOLUTION INTRAMUSCULAR; INTRAVENOUS at 01:40

## 2018-01-17 RX ADMIN — ESCITALOPRAM 10 MG: 10 TABLET, FILM COATED ORAL at 10:27

## 2018-01-17 RX ADMIN — METOLAZONE 2.5 MG: 2.5 TABLET ORAL at 10:25

## 2018-01-17 RX ADMIN — A/SINGAPORE/GP1908/2015 IVR-180 (H1N1) (AN A/MICHIGAN/45/2015-LIKE VIRUS), A/SINGAPORE/GP2050/2015 (H3N2) (AN A/HONG KONG/4801/2014 - LIKE VIRUS), B/UTAH/9/2014 (A B/PHUKET/3073/2013-LIKE VIRUS), B/HONG KONG/259/2010 (A B/BRISBANE/60/08-LIKE VIRUS) 0.5 ML: 15; 15; 15; 15 INJECTION, SUSPENSION INTRAMUSCULAR at 13:03

## 2018-01-17 RX ADMIN — MORPHINE SULFATE 1 MG: 2 INJECTION, SOLUTION INTRAMUSCULAR; INTRAVENOUS at 13:42

## 2018-01-17 RX ADMIN — IPRATROPIUM BROMIDE 0.5 MG: 0.5 SOLUTION RESPIRATORY (INHALATION) at 20:45

## 2018-01-17 RX ADMIN — IPRATROPIUM BROMIDE 0.5 MG: 0.5 SOLUTION RESPIRATORY (INHALATION) at 14:51

## 2018-01-18 ENCOUNTER — APPOINTMENT (OUTPATIENT)
Dept: CARDIOLOGY | Facility: HOSPITAL | Age: 63
End: 2018-01-18

## 2018-01-18 ENCOUNTER — APPOINTMENT (OUTPATIENT)
Dept: CT IMAGING | Facility: HOSPITAL | Age: 63
End: 2018-01-18

## 2018-01-18 LAB
ANION GAP SERPL CALCULATED.3IONS-SCNC: 15 MMOL/L (ref 4–13)
BASOPHILS # BLD AUTO: 0.05 10*3/MM3 (ref 0–0.2)
BASOPHILS NFR BLD AUTO: 0.9 % (ref 0–2)
BH CV ECHO MEAS - BSA(HAYCOCK): 2.8 M^2
BH CV ECHO MEAS - BSA: 2.6 M^2
BH CV ECHO MEAS - BZI_BMI: 44.1 KILOGRAMS/M^2
BH CV ECHO MEAS - BZI_METRIC_HEIGHT: 182.9 CM
BH CV ECHO MEAS - BZI_METRIC_WEIGHT: 147.4 KG
BH CV STRESS BP STAGE 1: NORMAL
BH CV STRESS BP STAGE 2: NORMAL
BH CV STRESS BP STAGE 3: NORMAL
BH CV STRESS BP STAGE 4: NORMAL
BH CV STRESS DOSE DOBUTAMINE STAGE 1: 10
BH CV STRESS DOSE DOBUTAMINE STAGE 2: 20
BH CV STRESS DOSE DOBUTAMINE STAGE 3: 30
BH CV STRESS DOSE DOBUTAMINE STAGE 4: 40
BH CV STRESS DURATION MIN STAGE 1: 3
BH CV STRESS DURATION MIN STAGE 2: 3
BH CV STRESS DURATION MIN STAGE 3: 2
BH CV STRESS DURATION MIN STAGE 4: 1
BH CV STRESS DURATION SEC STAGE 1: 0
BH CV STRESS DURATION SEC STAGE 2: 0
BH CV STRESS DURATION SEC STAGE 3: 0
BH CV STRESS DURATION SEC STAGE 4: 45
BH CV STRESS HR STAGE 1: 94
BH CV STRESS HR STAGE 2: 115
BH CV STRESS HR STAGE 3: 129
BH CV STRESS HR STAGE 4: 136
BH CV STRESS PROTOCOL 1: NORMAL
BH CV STRESS RECOVERY BP: NORMAL MMHG
BH CV STRESS RECOVERY HR: 102 BPM
BH CV STRESS STAGE 1: 1
BH CV STRESS STAGE 2: 2
BH CV STRESS STAGE 3: 3
BH CV STRESS STAGE 4: 4
BUN BLD-MCNC: 42 MG/DL (ref 5–21)
BUN/CREAT SERPL: 19.6 (ref 7–25)
CALCIUM SPEC-SCNC: 9 MG/DL (ref 8.4–10.4)
CHLORIDE SERPL-SCNC: 92 MMOL/L (ref 98–110)
CO2 SERPL-SCNC: 30 MMOL/L (ref 24–31)
CREAT BLD-MCNC: 2.14 MG/DL (ref 0.5–1.4)
DEPRECATED RDW RBC AUTO: 44.3 FL (ref 40–54)
EOSINOPHIL # BLD AUTO: 0.38 10*3/MM3 (ref 0–0.7)
EOSINOPHIL NFR BLD AUTO: 6.6 % (ref 0–4)
ERYTHROCYTE [DISTWIDTH] IN BLOOD BY AUTOMATED COUNT: 13.8 % (ref 12–15)
GFR SERPL CREATININE-BSD FRML MDRD: 32 ML/MIN/1.73
GLUCOSE BLD-MCNC: 330 MG/DL (ref 70–100)
GLUCOSE BLDC GLUCOMTR-MCNC: 378 MG/DL (ref 70–130)
GLUCOSE BLDC GLUCOMTR-MCNC: 392 MG/DL (ref 70–130)
GLUCOSE BLDC GLUCOMTR-MCNC: 434 MG/DL (ref 70–130)
GLUCOSE BLDC GLUCOMTR-MCNC: 457 MG/DL (ref 70–130)
GLUCOSE BLDC GLUCOMTR-MCNC: 462 MG/DL (ref 70–130)
GLUCOSE BLDC GLUCOMTR-MCNC: 505 MG/DL (ref 70–130)
GLUCOSE BLDC GLUCOMTR-MCNC: 522 MG/DL (ref 70–130)
GLUCOSE BLDC GLUCOMTR-MCNC: 592 MG/DL (ref 70–130)
HCT VFR BLD AUTO: 35.3 % (ref 40–52)
HGB BLD-MCNC: 12 G/DL (ref 14–18)
IMM GRANULOCYTES # BLD: 0.01 10*3/MM3 (ref 0–0.03)
IMM GRANULOCYTES NFR BLD: 0.2 % (ref 0–5)
LYMPHOCYTES # BLD AUTO: 1.62 10*3/MM3 (ref 0.72–4.86)
LYMPHOCYTES NFR BLD AUTO: 28.3 % (ref 15–45)
MAXIMAL PREDICTED HEART RATE: 159 BPM
MCH RBC QN AUTO: 29.9 PG (ref 28–32)
MCHC RBC AUTO-ENTMCNC: 34 G/DL (ref 33–36)
MCV RBC AUTO: 87.8 FL (ref 82–95)
MONOCYTES # BLD AUTO: 0.88 10*3/MM3 (ref 0.19–1.3)
MONOCYTES NFR BLD AUTO: 15.4 % (ref 4–12)
NEUTROPHILS # BLD AUTO: 2.78 10*3/MM3 (ref 1.87–8.4)
NEUTROPHILS NFR BLD AUTO: 48.6 % (ref 39–78)
NRBC BLD MANUAL-RTO: 0 /100 WBC (ref 0–0)
PERCENT MAX PREDICTED HR: 85.53 %
PLATELET # BLD AUTO: 199 10*3/MM3 (ref 130–400)
PMV BLD AUTO: 12.4 FL (ref 6–12)
POTASSIUM BLD-SCNC: 3.8 MMOL/L (ref 3.5–5.3)
RBC # BLD AUTO: 4.02 10*6/MM3 (ref 4.8–5.9)
SODIUM BLD-SCNC: 137 MMOL/L (ref 135–145)
STRESS BASELINE BP: NORMAL MMHG
STRESS BASELINE HR: 84 BPM
STRESS PERCENT HR: 101 %
STRESS POST EXERCISE DUR MIN: 10 MIN
STRESS POST EXERCISE DUR SEC: 45 SEC
STRESS POST PEAK BP: NORMAL MMHG
STRESS POST PEAK HR: 136 BPM
STRESS TARGET HR: 135 BPM
WBC NRBC COR # BLD: 5.72 10*3/MM3 (ref 4.8–10.8)

## 2018-01-18 PROCEDURE — 25010000002 METHYLPREDNISOLONE PER 125 MG: Performed by: NURSE PRACTITIONER

## 2018-01-18 PROCEDURE — 70450 CT HEAD/BRAIN W/O DYE: CPT

## 2018-01-18 PROCEDURE — 25010000002 HEPARIN (PORCINE) PER 1000 UNITS: Performed by: FAMILY MEDICINE

## 2018-01-18 PROCEDURE — 93350 STRESS TTE ONLY: CPT

## 2018-01-18 PROCEDURE — 80048 BASIC METABOLIC PNL TOTAL CA: CPT | Performed by: NURSE PRACTITIONER

## 2018-01-18 PROCEDURE — 85025 COMPLETE CBC W/AUTO DIFF WBC: CPT | Performed by: NURSE PRACTITIONER

## 2018-01-18 PROCEDURE — 71250 CT THORAX DX C-: CPT

## 2018-01-18 PROCEDURE — G0378 HOSPITAL OBSERVATION PER HR: HCPCS

## 2018-01-18 PROCEDURE — 25010000002 MORPHINE SULFATE (PF) 2 MG/ML SOLUTION: Performed by: FAMILY MEDICINE

## 2018-01-18 PROCEDURE — 93017 CV STRESS TEST TRACING ONLY: CPT

## 2018-01-18 PROCEDURE — 25010000002 PERFLUTREN 6.52 MG/ML SUSPENSION: Performed by: INTERNAL MEDICINE

## 2018-01-18 PROCEDURE — 25010000002 ONDANSETRON PER 1 MG: Performed by: FAMILY MEDICINE

## 2018-01-18 PROCEDURE — 82962 GLUCOSE BLOOD TEST: CPT

## 2018-01-18 PROCEDURE — 94799 UNLISTED PULMONARY SVC/PX: CPT

## 2018-01-18 PROCEDURE — 93018 CV STRESS TEST I&R ONLY: CPT | Performed by: INTERNAL MEDICINE

## 2018-01-18 PROCEDURE — 63710000001 INSULIN LISPRO (HUMAN) PER 5 UNITS: Performed by: FAMILY MEDICINE

## 2018-01-18 PROCEDURE — 93005 ELECTROCARDIOGRAM TRACING: CPT | Performed by: FAMILY MEDICINE

## 2018-01-18 PROCEDURE — 93352 ADMIN ECG CONTRAST AGENT: CPT | Performed by: INTERNAL MEDICINE

## 2018-01-18 PROCEDURE — 93350 STRESS TTE ONLY: CPT | Performed by: INTERNAL MEDICINE

## 2018-01-18 PROCEDURE — 25010000003 DOBUTAMINE PER 250 MG: Performed by: INTERNAL MEDICINE

## 2018-01-18 PROCEDURE — 63710000001 INSULIN REGULAR HUMAN PER 5 UNITS: Performed by: NURSE PRACTITIONER

## 2018-01-18 PROCEDURE — 93010 ELECTROCARDIOGRAM REPORT: CPT | Performed by: INTERNAL MEDICINE

## 2018-01-18 RX ORDER — OXYCODONE AND ACETAMINOPHEN 10; 325 MG/1; MG/1
1 TABLET ORAL 2 TIMES DAILY
Status: DISCONTINUED | OUTPATIENT
Start: 2018-01-18 | End: 2018-01-19 | Stop reason: HOSPADM

## 2018-01-18 RX ORDER — DOBUTAMINE HYDROCHLORIDE 100 MG/100ML
10-50 INJECTION INTRAVENOUS CONTINUOUS
Status: DISCONTINUED | OUTPATIENT
Start: 2018-01-18 | End: 2018-01-19 | Stop reason: HOSPADM

## 2018-01-18 RX ORDER — METHYLPREDNISOLONE SODIUM SUCCINATE 125 MG/2ML
60 INJECTION, POWDER, LYOPHILIZED, FOR SOLUTION INTRAMUSCULAR; INTRAVENOUS EVERY 12 HOURS
Status: COMPLETED | OUTPATIENT
Start: 2018-01-18 | End: 2018-01-19

## 2018-01-18 RX ORDER — ESCITALOPRAM OXALATE 10 MG/1
10 TABLET ORAL DAILY
Status: DISCONTINUED | OUTPATIENT
Start: 2018-01-18 | End: 2018-01-18

## 2018-01-18 RX ORDER — AMITRIPTYLINE HYDROCHLORIDE 25 MG/1
50 TABLET, FILM COATED ORAL NIGHTLY
Status: DISCONTINUED | OUTPATIENT
Start: 2018-01-18 | End: 2018-01-19 | Stop reason: HOSPADM

## 2018-01-18 RX ORDER — SODIUM CHLORIDE 9 MG/ML
50 INJECTION, SOLUTION INTRAVENOUS CONTINUOUS
Status: DISCONTINUED | OUTPATIENT
Start: 2018-01-18 | End: 2018-01-19

## 2018-01-18 RX ADMIN — IPRATROPIUM BROMIDE 0.5 MG: 0.5 SOLUTION RESPIRATORY (INHALATION) at 21:07

## 2018-01-18 RX ADMIN — INSULIN LISPRO 12 UNITS: 100 INJECTION, SOLUTION INTRAVENOUS; SUBCUTANEOUS at 17:41

## 2018-01-18 RX ADMIN — OXYCODONE HYDROCHLORIDE AND ACETAMINOPHEN 1 TABLET: 10; 325 TABLET ORAL at 20:34

## 2018-01-18 RX ADMIN — IPRATROPIUM BROMIDE 0.5 MG: 0.5 SOLUTION RESPIRATORY (INHALATION) at 07:09

## 2018-01-18 RX ADMIN — HEPARIN SODIUM 5000 UNITS: 5000 INJECTION, SOLUTION INTRAVENOUS; SUBCUTANEOUS at 08:28

## 2018-01-18 RX ADMIN — INSULIN LISPRO 14 UNITS: 100 INJECTION, SOLUTION INTRAVENOUS; SUBCUTANEOUS at 08:28

## 2018-01-18 RX ADMIN — Medication 10 ML: at 00:31

## 2018-01-18 RX ADMIN — Medication 10 MCG/KG/MIN: at 13:29

## 2018-01-18 RX ADMIN — METHYLPREDNISOLONE SODIUM SUCCINATE 60 MG: 125 INJECTION, POWDER, FOR SOLUTION INTRAMUSCULAR; INTRAVENOUS at 14:20

## 2018-01-18 RX ADMIN — PANTOPRAZOLE SODIUM 40 MG: 40 TABLET, DELAYED RELEASE ORAL at 08:28

## 2018-01-18 RX ADMIN — IPRATROPIUM BROMIDE 0.5 MG: 0.5 SOLUTION RESPIRATORY (INHALATION) at 10:41

## 2018-01-18 RX ADMIN — ROSUVASTATIN 40 MG: 20 TABLET, FILM COATED ORAL at 08:28

## 2018-01-18 RX ADMIN — AMITRIPTYLINE HYDROCHLORIDE 50 MG: 25 TABLET, FILM COATED ORAL at 20:34

## 2018-01-18 RX ADMIN — ALLOPURINOL 100 MG: 100 TABLET ORAL at 08:28

## 2018-01-18 RX ADMIN — IPRATROPIUM BROMIDE 0.5 MG: 0.5 SOLUTION RESPIRATORY (INHALATION) at 14:56

## 2018-01-18 RX ADMIN — FAMOTIDINE 40 MG: 20 TABLET ORAL at 08:27

## 2018-01-18 RX ADMIN — Medication 81 MG: at 08:28

## 2018-01-18 RX ADMIN — ISOSORBIDE MONONITRATE 30 MG: 30 TABLET, EXTENDED RELEASE ORAL at 08:28

## 2018-01-18 RX ADMIN — HEPARIN SODIUM 5000 UNITS: 5000 INJECTION, SOLUTION INTRAVENOUS; SUBCUTANEOUS at 20:39

## 2018-01-18 RX ADMIN — ONDANSETRON HYDROCHLORIDE 4 MG: 2 SOLUTION INTRAMUSCULAR; INTRAVENOUS at 00:31

## 2018-01-18 RX ADMIN — IRBESARTAN 300 MG: 150 TABLET ORAL at 08:27

## 2018-01-18 RX ADMIN — SODIUM CHLORIDE 50 ML/HR: 9 INJECTION, SOLUTION INTRAVENOUS at 14:19

## 2018-01-18 RX ADMIN — ESCITALOPRAM 10 MG: 10 TABLET, FILM COATED ORAL at 08:28

## 2018-01-18 RX ADMIN — INSULIN LISPRO 14 UNITS: 100 INJECTION, SOLUTION INTRAVENOUS; SUBCUTANEOUS at 20:39

## 2018-01-18 RX ADMIN — INSULIN LISPRO 12 UNITS: 100 INJECTION, SOLUTION INTRAVENOUS; SUBCUTANEOUS at 14:19

## 2018-01-18 RX ADMIN — PERFLUTREN 8.48 MG: 6.52 INJECTION, SUSPENSION INTRAVENOUS at 13:44

## 2018-01-18 RX ADMIN — METOLAZONE 2.5 MG: 2.5 TABLET ORAL at 08:28

## 2018-01-18 RX ADMIN — OXYCODONE HYDROCHLORIDE AND ACETAMINOPHEN 1 TABLET: 10; 325 TABLET ORAL at 09:12

## 2018-01-18 RX ADMIN — ONDANSETRON HYDROCHLORIDE 4 MG: 2 SOLUTION INTRAMUSCULAR; INTRAVENOUS at 08:27

## 2018-01-18 RX ADMIN — MORPHINE SULFATE 1 MG: 2 INJECTION, SOLUTION INTRAMUSCULAR; INTRAVENOUS at 00:30

## 2018-01-18 RX ADMIN — NEBIVOLOL HYDROCHLORIDE 5 MG: 5 TABLET ORAL at 08:28

## 2018-01-18 RX ADMIN — INSULIN LISPRO 5 UNITS: 100 INJECTION, SOLUTION INTRAVENOUS; SUBCUTANEOUS at 22:32

## 2018-01-18 RX ADMIN — INSULIN HUMAN 40 UNITS: 100 INJECTION, SOLUTION PARENTERAL at 14:19

## 2018-01-18 RX ADMIN — INSULIN HUMAN 40 UNITS: 100 INJECTION, SOLUTION PARENTERAL at 17:42

## 2018-01-19 VITALS
WEIGHT: 315 LBS | SYSTOLIC BLOOD PRESSURE: 136 MMHG | TEMPERATURE: 97.6 F | RESPIRATION RATE: 20 BRPM | BODY MASS INDEX: 42.66 KG/M2 | OXYGEN SATURATION: 93 % | DIASTOLIC BLOOD PRESSURE: 75 MMHG | HEART RATE: 82 BPM | HEIGHT: 72 IN

## 2018-01-19 PROBLEM — R07.81 PLEURITIC CHEST PAIN: Status: ACTIVE | Noted: 2018-01-17

## 2018-01-19 LAB
ALBUMIN SERPL-MCNC: 4 G/DL (ref 3.5–5)
ALBUMIN/GLOB SERPL: 1.5 G/DL (ref 1.1–2.5)
ALP SERPL-CCNC: 70 U/L (ref 24–120)
ALT SERPL W P-5'-P-CCNC: 44 U/L (ref 0–54)
ANION GAP SERPL CALCULATED.3IONS-SCNC: 12 MMOL/L (ref 4–13)
AST SERPL-CCNC: 31 U/L (ref 7–45)
BASOPHILS # BLD AUTO: 0.01 10*3/MM3 (ref 0–0.2)
BASOPHILS NFR BLD AUTO: 0.2 % (ref 0–2)
BILIRUB SERPL-MCNC: 0.6 MG/DL (ref 0.1–1)
BUN BLD-MCNC: 47 MG/DL (ref 5–21)
BUN/CREAT SERPL: 22 (ref 7–25)
CALCIUM SPEC-SCNC: 9.4 MG/DL (ref 8.4–10.4)
CHLORIDE SERPL-SCNC: 92 MMOL/L (ref 98–110)
CO2 SERPL-SCNC: 29 MMOL/L (ref 24–31)
CREAT BLD-MCNC: 2.14 MG/DL (ref 0.5–1.4)
DEPRECATED RDW RBC AUTO: 41.9 FL (ref 40–54)
EOSINOPHIL # BLD AUTO: 0 10*3/MM3 (ref 0–0.7)
EOSINOPHIL NFR BLD AUTO: 0 % (ref 0–4)
ERYTHROCYTE [DISTWIDTH] IN BLOOD BY AUTOMATED COUNT: 13.4 % (ref 12–15)
GFR SERPL CREATININE-BSD FRML MDRD: 32 ML/MIN/1.73
GLOBULIN UR ELPH-MCNC: 2.6 GM/DL
GLUCOSE BLD-MCNC: 374 MG/DL (ref 70–100)
GLUCOSE BLDC GLUCOMTR-MCNC: 368 MG/DL (ref 70–130)
GLUCOSE BLDC GLUCOMTR-MCNC: 434 MG/DL (ref 70–130)
GLUCOSE BLDC GLUCOMTR-MCNC: 435 MG/DL (ref 70–130)
GLUCOSE BLDC GLUCOMTR-MCNC: 463 MG/DL (ref 70–130)
HCT VFR BLD AUTO: 33.6 % (ref 40–52)
HGB BLD-MCNC: 11.7 G/DL (ref 14–18)
IMM GRANULOCYTES # BLD: 0.04 10*3/MM3 (ref 0–0.03)
IMM GRANULOCYTES NFR BLD: 0.7 % (ref 0–5)
LYMPHOCYTES # BLD AUTO: 0.67 10*3/MM3 (ref 0.72–4.86)
LYMPHOCYTES NFR BLD AUTO: 10.9 % (ref 15–45)
MCH RBC QN AUTO: 30.2 PG (ref 28–32)
MCHC RBC AUTO-ENTMCNC: 34.8 G/DL (ref 33–36)
MCV RBC AUTO: 86.8 FL (ref 82–95)
MONOCYTES # BLD AUTO: 0.13 10*3/MM3 (ref 0.19–1.3)
MONOCYTES NFR BLD AUTO: 2.1 % (ref 4–12)
NEUTROPHILS # BLD AUTO: 5.27 10*3/MM3 (ref 1.87–8.4)
NEUTROPHILS NFR BLD AUTO: 86.1 % (ref 39–78)
NRBC BLD MANUAL-RTO: 0 /100 WBC (ref 0–0)
PLATELET # BLD AUTO: 189 10*3/MM3 (ref 130–400)
PMV BLD AUTO: 12.4 FL (ref 6–12)
POTASSIUM BLD-SCNC: 4.6 MMOL/L (ref 3.5–5.3)
PROT SERPL-MCNC: 6.6 G/DL (ref 6.3–8.7)
RBC # BLD AUTO: 3.87 10*6/MM3 (ref 4.8–5.9)
SODIUM BLD-SCNC: 133 MMOL/L (ref 135–145)
WBC NRBC COR # BLD: 6.12 10*3/MM3 (ref 4.8–10.8)

## 2018-01-19 PROCEDURE — 25010000002 METHYLPREDNISOLONE PER 125 MG: Performed by: NURSE PRACTITIONER

## 2018-01-19 PROCEDURE — 94799 UNLISTED PULMONARY SVC/PX: CPT

## 2018-01-19 PROCEDURE — 25010000002 HEPARIN (PORCINE) PER 1000 UNITS: Performed by: FAMILY MEDICINE

## 2018-01-19 PROCEDURE — 94760 N-INVAS EAR/PLS OXIMETRY 1: CPT

## 2018-01-19 PROCEDURE — 80053 COMPREHEN METABOLIC PANEL: CPT | Performed by: NURSE PRACTITIONER

## 2018-01-19 PROCEDURE — 82962 GLUCOSE BLOOD TEST: CPT

## 2018-01-19 PROCEDURE — 85025 COMPLETE CBC W/AUTO DIFF WBC: CPT | Performed by: NURSE PRACTITIONER

## 2018-01-19 PROCEDURE — 63710000001 INSULIN LISPRO (HUMAN) PER 5 UNITS: Performed by: FAMILY MEDICINE

## 2018-01-19 PROCEDURE — G8980 MOBILITY D/C STATUS: HCPCS

## 2018-01-19 PROCEDURE — G8979 MOBILITY GOAL STATUS: HCPCS

## 2018-01-19 PROCEDURE — 97161 PT EVAL LOW COMPLEX 20 MIN: CPT

## 2018-01-19 PROCEDURE — 63710000001 INSULIN REGULAR HUMAN PER 5 UNITS: Performed by: NURSE PRACTITIONER

## 2018-01-19 PROCEDURE — G8978 MOBILITY CURRENT STATUS: HCPCS

## 2018-01-19 RX ADMIN — IPRATROPIUM BROMIDE 0.5 MG: 0.5 SOLUTION RESPIRATORY (INHALATION) at 10:52

## 2018-01-19 RX ADMIN — INSULIN HUMAN 100 UNITS: 100 INJECTION, SOLUTION PARENTERAL at 10:05

## 2018-01-19 RX ADMIN — OXYCODONE HYDROCHLORIDE AND ACETAMINOPHEN 1 TABLET: 10; 325 TABLET ORAL at 09:45

## 2018-01-19 RX ADMIN — ISOSORBIDE MONONITRATE 30 MG: 30 TABLET, EXTENDED RELEASE ORAL at 09:44

## 2018-01-19 RX ADMIN — PANTOPRAZOLE SODIUM 40 MG: 40 TABLET, DELAYED RELEASE ORAL at 09:45

## 2018-01-19 RX ADMIN — IRBESARTAN 300 MG: 150 TABLET ORAL at 09:45

## 2018-01-19 RX ADMIN — FAMOTIDINE 40 MG: 20 TABLET ORAL at 09:45

## 2018-01-19 RX ADMIN — ESCITALOPRAM 10 MG: 10 TABLET, FILM COATED ORAL at 09:45

## 2018-01-19 RX ADMIN — Medication 81 MG: at 09:45

## 2018-01-19 RX ADMIN — ROSUVASTATIN 40 MG: 20 TABLET, FILM COATED ORAL at 09:45

## 2018-01-19 RX ADMIN — IPRATROPIUM BROMIDE 0.5 MG: 0.5 SOLUTION RESPIRATORY (INHALATION) at 06:49

## 2018-01-19 RX ADMIN — HEPARIN SODIUM 5000 UNITS: 5000 INJECTION, SOLUTION INTRAVENOUS; SUBCUTANEOUS at 09:45

## 2018-01-19 RX ADMIN — METHYLPREDNISOLONE SODIUM SUCCINATE 60 MG: 125 INJECTION, POWDER, FOR SOLUTION INTRAMUSCULAR; INTRAVENOUS at 00:14

## 2018-01-19 RX ADMIN — ALLOPURINOL 100 MG: 100 TABLET ORAL at 09:45

## 2018-01-19 RX ADMIN — NEBIVOLOL HYDROCHLORIDE 5 MG: 5 TABLET ORAL at 09:45

## 2018-01-19 RX ADMIN — INSULIN LISPRO 14 UNITS: 100 INJECTION, SOLUTION INTRAVENOUS; SUBCUTANEOUS at 09:43

## 2018-01-19 RX ADMIN — INSULIN LISPRO 14 UNITS: 100 INJECTION, SOLUTION INTRAVENOUS; SUBCUTANEOUS at 11:38

## 2018-01-19 RX ADMIN — INSULIN LISPRO 5 UNITS: 100 INJECTION, SOLUTION INTRAVENOUS; SUBCUTANEOUS at 00:14

## 2018-01-19 RX ADMIN — METOLAZONE 2.5 MG: 2.5 TABLET ORAL at 09:45

## 2018-02-02 ENCOUNTER — APPOINTMENT (OUTPATIENT)
Dept: LAB | Facility: HOSPITAL | Age: 63
End: 2018-02-02

## 2018-02-02 ENCOUNTER — TRANSCRIBE ORDERS (OUTPATIENT)
Dept: ADMINISTRATIVE | Facility: HOSPITAL | Age: 63
End: 2018-02-02

## 2018-02-02 DIAGNOSIS — N18.30 CHRONIC KIDNEY DISEASE, STAGE III (MODERATE) (HCC): Primary | ICD-10-CM

## 2018-02-02 DIAGNOSIS — I10 ESSENTIAL HYPERTENSION, MALIGNANT: ICD-10-CM

## 2018-02-02 LAB
25(OH)D3 SERPL-MCNC: 31.1 NG/ML (ref 30–100)
ALBUMIN SERPL-MCNC: 4.3 G/DL (ref 3.5–5)
ALBUMIN/GLOB SERPL: 1.5 G/DL (ref 1.1–2.5)
ALP SERPL-CCNC: 62 U/L (ref 24–120)
ALT SERPL W P-5'-P-CCNC: 54 U/L (ref 0–54)
ANION GAP SERPL CALCULATED.3IONS-SCNC: 16 MMOL/L (ref 4–13)
AST SERPL-CCNC: 43 U/L (ref 7–45)
BILIRUB SERPL-MCNC: 0.5 MG/DL (ref 0.1–1)
BILIRUB UR QL STRIP: NEGATIVE
BUN BLD-MCNC: 41 MG/DL (ref 5–21)
BUN/CREAT SERPL: 24.6 (ref 7–25)
CALCIUM SPEC-SCNC: 9.8 MG/DL (ref 8.4–10.4)
CHLORIDE SERPL-SCNC: 98 MMOL/L (ref 98–110)
CLARITY UR: CLEAR
CO2 SERPL-SCNC: 28 MMOL/L (ref 24–31)
COLOR UR: YELLOW
CREAT BLD-MCNC: 1.67 MG/DL (ref 0.5–1.4)
CREAT UR-MCNC: 41.1 MG/DL
DEPRECATED RDW RBC AUTO: 44.1 FL (ref 40–54)
ERYTHROCYTE [DISTWIDTH] IN BLOOD BY AUTOMATED COUNT: 13.5 % (ref 12–15)
GFR SERPL CREATININE-BSD FRML MDRD: 42 ML/MIN/1.73
GLOBULIN UR ELPH-MCNC: 2.8 GM/DL
GLUCOSE BLD-MCNC: 186 MG/DL (ref 70–100)
GLUCOSE UR STRIP-MCNC: NEGATIVE MG/DL
HCT VFR BLD AUTO: 39.1 % (ref 40–52)
HGB BLD-MCNC: 13.1 G/DL (ref 14–18)
HGB UR QL STRIP.AUTO: NEGATIVE
KETONES UR QL STRIP: NEGATIVE
LEUKOCYTE ESTERASE UR QL STRIP.AUTO: NEGATIVE
MCH RBC QN AUTO: 29.8 PG (ref 28–32)
MCHC RBC AUTO-ENTMCNC: 33.5 G/DL (ref 33–36)
MCV RBC AUTO: 88.9 FL (ref 82–95)
NITRITE UR QL STRIP: NEGATIVE
PH UR STRIP.AUTO: <=5 [PH] (ref 5–8)
PLATELET # BLD AUTO: 288 10*3/MM3 (ref 130–400)
PMV BLD AUTO: 11.1 FL (ref 6–12)
POTASSIUM BLD-SCNC: 3.9 MMOL/L (ref 3.5–5.3)
PROT SERPL-MCNC: 7.1 G/DL (ref 6.3–8.7)
PROT UR QL STRIP: NEGATIVE
PROT UR-MCNC: 10 MG/DL (ref 0–13.5)
PTH-INTACT SERPL-MCNC: 55 PG/ML (ref 7.5–53.5)
RBC # BLD AUTO: 4.4 10*6/MM3 (ref 4.8–5.9)
SODIUM BLD-SCNC: 142 MMOL/L (ref 135–145)
SP GR UR STRIP: 1.01 (ref 1–1.03)
URATE SERPL-MCNC: 9.2 MG/DL (ref 3.5–8.5)
UROBILINOGEN UR QL STRIP: NORMAL
WBC NRBC COR # BLD: 7.11 10*3/MM3 (ref 4.8–10.8)

## 2018-02-02 PROCEDURE — 83970 ASSAY OF PARATHORMONE: CPT | Performed by: NURSE PRACTITIONER

## 2018-02-02 PROCEDURE — 82570 ASSAY OF URINE CREATININE: CPT | Performed by: NURSE PRACTITIONER

## 2018-02-02 PROCEDURE — 85027 COMPLETE CBC AUTOMATED: CPT | Performed by: NURSE PRACTITIONER

## 2018-02-02 PROCEDURE — 84550 ASSAY OF BLOOD/URIC ACID: CPT | Performed by: NURSE PRACTITIONER

## 2018-02-02 PROCEDURE — 81003 URINALYSIS AUTO W/O SCOPE: CPT | Performed by: NURSE PRACTITIONER

## 2018-02-02 PROCEDURE — 82306 VITAMIN D 25 HYDROXY: CPT | Performed by: NURSE PRACTITIONER

## 2018-02-02 PROCEDURE — 84156 ASSAY OF PROTEIN URINE: CPT | Performed by: NURSE PRACTITIONER

## 2018-02-02 PROCEDURE — 80053 COMPREHEN METABOLIC PANEL: CPT | Performed by: NURSE PRACTITIONER

## 2018-02-02 PROCEDURE — 36415 COLL VENOUS BLD VENIPUNCTURE: CPT

## 2018-02-08 ENCOUNTER — TELEPHONE (OUTPATIENT)
Dept: CARDIOLOGY | Facility: CLINIC | Age: 63
End: 2018-02-08

## 2018-02-08 NOTE — TELEPHONE ENCOUNTER
At the time of my last evaluation, this patient had no active cardiac conditions.  Would recommend continuation of aspirin.  However, no further cardiac work-up is indicated prior to this low risk surgery.

## 2018-02-08 NOTE — TELEPHONE ENCOUNTER
[facility] called request Surgery Clearance for the patient he is scheduled to have cataract surgery on Feb 15,2018. Patient was in the hospital in 01- for Chest pain patient has CAD and CHF and LOV was on 08- Last Cath was 10-01-15 and patient is on ASA

## 2018-07-27 ENCOUNTER — APPOINTMENT (OUTPATIENT)
Dept: LAB | Facility: HOSPITAL | Age: 63
End: 2018-07-27

## 2018-07-27 ENCOUNTER — TRANSCRIBE ORDERS (OUTPATIENT)
Dept: ADMINISTRATIVE | Facility: HOSPITAL | Age: 63
End: 2018-07-27

## 2018-07-27 DIAGNOSIS — L89.899: ICD-10-CM

## 2018-07-27 DIAGNOSIS — R35.1 BPH ASSOCIATED WITH NOCTURIA: Primary | ICD-10-CM

## 2018-07-27 DIAGNOSIS — N40.1 BPH ASSOCIATED WITH NOCTURIA: Primary | ICD-10-CM

## 2018-07-27 DIAGNOSIS — E11.628 TYPE 2 DIABETES MELLITUS WITH OTHER SKIN COMPLICATIONS (CODE): ICD-10-CM

## 2018-07-27 DIAGNOSIS — E11.621: ICD-10-CM

## 2018-07-27 DIAGNOSIS — E78.5 HYPERLIPIDEMIA, UNSPECIFIED HYPERLIPIDEMIA TYPE: ICD-10-CM

## 2018-07-27 DIAGNOSIS — I10 HYPERTENSION, UNSPECIFIED TYPE: ICD-10-CM

## 2018-07-27 LAB
ALBUMIN SERPL-MCNC: 4.2 G/DL (ref 3.5–5)
ALBUMIN/GLOB SERPL: 1.6 G/DL (ref 1.1–2.5)
ALP SERPL-CCNC: 54 U/L (ref 24–120)
ALT SERPL W P-5'-P-CCNC: 34 U/L (ref 0–54)
ANION GAP SERPL CALCULATED.3IONS-SCNC: 12 MMOL/L (ref 4–13)
ARTICHOKE IGE QN: 61 MG/DL (ref 0–99)
AST SERPL-CCNC: 34 U/L (ref 7–45)
BILIRUB SERPL-MCNC: 0.5 MG/DL (ref 0.1–1)
BUN BLD-MCNC: 37 MG/DL (ref 5–21)
BUN/CREAT SERPL: 21.6 (ref 7–25)
CALCIUM SPEC-SCNC: 9.2 MG/DL (ref 8.4–10.4)
CHLORIDE SERPL-SCNC: 99 MMOL/L (ref 98–110)
CHOLEST SERPL-MCNC: 130 MG/DL (ref 130–200)
CO2 SERPL-SCNC: 30 MMOL/L (ref 24–31)
CREAT BLD-MCNC: 1.71 MG/DL (ref 0.5–1.4)
DEPRECATED RDW RBC AUTO: 43.6 FL (ref 40–54)
ERYTHROCYTE [DISTWIDTH] IN BLOOD BY AUTOMATED COUNT: 13.4 % (ref 12–15)
GFR SERPL CREATININE-BSD FRML MDRD: 41 ML/MIN/1.73
GLOBULIN UR ELPH-MCNC: 2.6 GM/DL
GLUCOSE BLD-MCNC: 78 MG/DL (ref 70–100)
HBA1C MFR BLD: 10.1 %
HCT VFR BLD AUTO: 35.9 % (ref 40–52)
HDLC SERPL-MCNC: 38 MG/DL
HGB BLD-MCNC: 12.1 G/DL (ref 14–18)
LDLC/HDLC SERPL: 1.11 {RATIO}
MCH RBC QN AUTO: 29.8 PG (ref 28–32)
MCHC RBC AUTO-ENTMCNC: 33.7 G/DL (ref 33–36)
MCV RBC AUTO: 88.4 FL (ref 82–95)
PLATELET # BLD AUTO: 228 10*3/MM3 (ref 130–400)
PMV BLD AUTO: 11 FL (ref 6–12)
POTASSIUM BLD-SCNC: 4.5 MMOL/L (ref 3.5–5.3)
PROT SERPL-MCNC: 6.8 G/DL (ref 6.3–8.7)
PSA SERPL-MCNC: 0.18 NG/ML (ref 0–4)
RBC # BLD AUTO: 4.06 10*6/MM3 (ref 4.8–5.9)
SODIUM BLD-SCNC: 141 MMOL/L (ref 135–145)
TRIGL SERPL-MCNC: 249 MG/DL (ref 0–149)
TSH SERPL DL<=0.05 MIU/L-ACNC: 3.22 MIU/ML (ref 0.47–4.68)
WBC NRBC COR # BLD: 8.96 10*3/MM3 (ref 4.8–10.8)

## 2018-07-27 PROCEDURE — 82043 UR ALBUMIN QUANTITATIVE: CPT | Performed by: FAMILY MEDICINE

## 2018-07-27 PROCEDURE — 84443 ASSAY THYROID STIM HORMONE: CPT | Performed by: FAMILY MEDICINE

## 2018-07-27 PROCEDURE — 80053 COMPREHEN METABOLIC PANEL: CPT | Performed by: FAMILY MEDICINE

## 2018-07-27 PROCEDURE — 36415 COLL VENOUS BLD VENIPUNCTURE: CPT

## 2018-07-27 PROCEDURE — 82570 ASSAY OF URINE CREATININE: CPT | Performed by: FAMILY MEDICINE

## 2018-07-27 PROCEDURE — 80061 LIPID PANEL: CPT | Performed by: FAMILY MEDICINE

## 2018-07-27 PROCEDURE — 84153 ASSAY OF PSA TOTAL: CPT | Performed by: FAMILY MEDICINE

## 2018-07-27 PROCEDURE — 85027 COMPLETE CBC AUTOMATED: CPT | Performed by: FAMILY MEDICINE

## 2018-07-27 PROCEDURE — 83036 HEMOGLOBIN GLYCOSYLATED A1C: CPT | Performed by: FAMILY MEDICINE

## 2018-07-28 LAB
CREAT 24H UR-MCNC: 68.3 MG/DL
MICROALBUMIN UR-MCNC: 13.2 UG/ML
MICROALBUMIN/CREAT UR: 19.3 MG/G CREAT (ref 0–30)

## 2018-09-07 RX ORDER — PANTOPRAZOLE SODIUM 40 MG/1
40 TABLET, DELAYED RELEASE ORAL DAILY
Qty: 30 TABLET | Refills: 0 | OUTPATIENT
Start: 2018-09-07

## 2018-11-05 ENCOUNTER — TRANSCRIBE ORDERS (OUTPATIENT)
Dept: LAB | Facility: HOSPITAL | Age: 63
End: 2018-11-05

## 2018-11-05 ENCOUNTER — APPOINTMENT (OUTPATIENT)
Dept: LAB | Facility: HOSPITAL | Age: 63
End: 2018-11-05

## 2018-11-05 DIAGNOSIS — E78.81 LIPOID DERMATOARTHRITIS: ICD-10-CM

## 2018-11-05 DIAGNOSIS — E11.9 DIABETES MELLITUS WITHOUT COMPLICATION (HCC): Primary | ICD-10-CM

## 2018-11-05 LAB
ARTICHOKE IGE QN: 81 MG/DL (ref 0–99)
CHOLEST SERPL-MCNC: 149 MG/DL (ref 130–200)
HBA1C MFR BLD: 10.1 %
HDLC SERPL-MCNC: 36 MG/DL
LDLC/HDLC SERPL: 1.43 {RATIO}
TRIGL SERPL-MCNC: 308 MG/DL (ref 0–149)

## 2018-11-05 PROCEDURE — 80061 LIPID PANEL: CPT | Performed by: FAMILY MEDICINE

## 2018-11-05 PROCEDURE — 83036 HEMOGLOBIN GLYCOSYLATED A1C: CPT | Performed by: FAMILY MEDICINE

## 2018-11-05 PROCEDURE — 36415 COLL VENOUS BLD VENIPUNCTURE: CPT

## 2019-01-02 ENCOUNTER — APPOINTMENT (OUTPATIENT)
Dept: LAB | Facility: HOSPITAL | Age: 64
End: 2019-01-02

## 2019-01-02 ENCOUNTER — TRANSCRIBE ORDERS (OUTPATIENT)
Dept: ADMINISTRATIVE | Facility: HOSPITAL | Age: 64
End: 2019-01-02

## 2019-01-02 DIAGNOSIS — I10 HYPERTENSION, UNSPECIFIED TYPE: Primary | ICD-10-CM

## 2019-01-02 DIAGNOSIS — K21.9 CHALASIA OF LOWER ESOPHAGEAL SPHINCTER: ICD-10-CM

## 2019-01-02 DIAGNOSIS — E11.40 DIABETIC NEUROPATHY WITH NEUROLOGIC COMPLICATION (HCC): ICD-10-CM

## 2019-01-02 DIAGNOSIS — E11.49 DIABETIC NEUROPATHY WITH NEUROLOGIC COMPLICATION (HCC): ICD-10-CM

## 2019-01-02 DIAGNOSIS — E11.9 DIABETES MELLITUS WITHOUT COMPLICATION (HCC): ICD-10-CM

## 2019-01-02 DIAGNOSIS — N18.30 CHRONIC KIDNEY DISEASE, STAGE III (MODERATE) (HCC): ICD-10-CM

## 2019-01-02 LAB
ALBUMIN SERPL-MCNC: 4.6 G/DL (ref 3.5–5)
ALBUMIN/GLOB SERPL: 1.4 G/DL (ref 1.1–2.5)
ALP SERPL-CCNC: 66 U/L (ref 24–120)
ALT SERPL W P-5'-P-CCNC: 34 U/L (ref 0–54)
ANION GAP SERPL CALCULATED.3IONS-SCNC: 16 MMOL/L (ref 4–13)
AST SERPL-CCNC: 38 U/L (ref 7–45)
BILIRUB SERPL-MCNC: 0.8 MG/DL (ref 0.1–1)
BILIRUB UR QL STRIP: NEGATIVE
BUN BLD-MCNC: 49 MG/DL (ref 5–21)
BUN/CREAT SERPL: 22.3 (ref 7–25)
CALCIUM SPEC-SCNC: 9.4 MG/DL (ref 8.4–10.4)
CHLORIDE SERPL-SCNC: 94 MMOL/L (ref 98–110)
CLARITY UR: CLEAR
CO2 SERPL-SCNC: 30 MMOL/L (ref 24–31)
COLOR UR: YELLOW
CREAT BLD-MCNC: 2.2 MG/DL (ref 0.5–1.4)
DEPRECATED RDW RBC AUTO: 40.2 FL (ref 40–54)
ERYTHROCYTE [DISTWIDTH] IN BLOOD BY AUTOMATED COUNT: 13 % (ref 12–15)
GFR SERPL CREATININE-BSD FRML MDRD: 30 ML/MIN/1.73
GLOBULIN UR ELPH-MCNC: 3.4 GM/DL
GLUCOSE BLD-MCNC: 91 MG/DL (ref 70–100)
GLUCOSE UR STRIP-MCNC: NEGATIVE MG/DL
HCT VFR BLD AUTO: 43.5 % (ref 40–52)
HGB BLD-MCNC: 15.4 G/DL (ref 14–18)
HGB UR QL STRIP.AUTO: NEGATIVE
KETONES UR QL STRIP: NEGATIVE
LEUKOCYTE ESTERASE UR QL STRIP.AUTO: NEGATIVE
MCH RBC QN AUTO: 29.8 PG (ref 28–32)
MCHC RBC AUTO-ENTMCNC: 35.4 G/DL (ref 33–36)
MCV RBC AUTO: 84.1 FL (ref 82–95)
NITRITE UR QL STRIP: NEGATIVE
PH UR STRIP.AUTO: 5.5 [PH] (ref 5–8)
PLATELET # BLD AUTO: 287 10*3/MM3 (ref 130–400)
PMV BLD AUTO: 11.4 FL (ref 6–12)
POTASSIUM BLD-SCNC: 4 MMOL/L (ref 3.5–5.3)
PROT SERPL-MCNC: 8 G/DL (ref 6.3–8.7)
PROT UR QL STRIP: NEGATIVE
RBC # BLD AUTO: 5.17 10*6/MM3 (ref 4.8–5.9)
SODIUM BLD-SCNC: 140 MMOL/L (ref 135–145)
SP GR UR STRIP: 1.01 (ref 1–1.03)
UROBILINOGEN UR QL STRIP: NORMAL
WBC NRBC COR # BLD: 11.25 10*3/MM3 (ref 4.8–10.8)

## 2019-01-02 PROCEDURE — 81003 URINALYSIS AUTO W/O SCOPE: CPT | Performed by: FAMILY MEDICINE

## 2019-01-02 PROCEDURE — 80053 COMPREHEN METABOLIC PANEL: CPT | Performed by: FAMILY MEDICINE

## 2019-01-02 PROCEDURE — 36415 COLL VENOUS BLD VENIPUNCTURE: CPT

## 2019-01-02 PROCEDURE — 85027 COMPLETE CBC AUTOMATED: CPT | Performed by: FAMILY MEDICINE

## 2019-01-09 ENCOUNTER — APPOINTMENT (OUTPATIENT)
Dept: CT IMAGING | Facility: HOSPITAL | Age: 64
End: 2019-01-09

## 2019-01-09 ENCOUNTER — HOSPITAL ENCOUNTER (EMERGENCY)
Facility: HOSPITAL | Age: 64
Discharge: HOME | End: 2019-01-09
Attending: EMERGENCY MEDICINE | Admitting: EMERGENCY MEDICINE

## 2019-01-09 VITALS
DIASTOLIC BLOOD PRESSURE: 78 MMHG | HEART RATE: 98 BPM | SYSTOLIC BLOOD PRESSURE: 126 MMHG | RESPIRATION RATE: 13 BRPM | BODY MASS INDEX: 41.72 KG/M2 | OXYGEN SATURATION: 98 % | WEIGHT: 308 LBS | TEMPERATURE: 97.3 F | HEIGHT: 72 IN

## 2019-01-09 DIAGNOSIS — R10.30 LOWER ABDOMINAL PAIN: Primary | ICD-10-CM

## 2019-01-09 DIAGNOSIS — M54.2 NECK PAIN: ICD-10-CM
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
## 2019-01-09 LAB
ALBUMIN SERPL-MCNC: 4.7 G/DL (ref 3.5–5)
ALBUMIN/GLOB SERPL: 1.3 G/DL (ref 1.1–2.5)
ALP SERPL-CCNC: 78 U/L (ref 24–120)
ALT SERPL W P-5'-P-CCNC: 36 U/L (ref 0–54)
ANION GAP SERPL CALCULATED.3IONS-SCNC: 16 MMOL/L (ref 4–13)
AST SERPL-CCNC: 33 U/L (ref 7–45)
BASOPHILS # BLD AUTO: 0.06 10*3/MM3 (ref 0–0.2)
BASOPHILS NFR BLD AUTO: 0.7 % (ref 0–2)
BILIRUB SERPL-MCNC: 0.5 MG/DL (ref 0.1–1)
BUN BLD-MCNC: 52 MG/DL (ref 5–21)
BUN/CREAT SERPL: 21.4 (ref 7–25)
CALCIUM SPEC-SCNC: 10.1 MG/DL (ref 8.4–10.4)
CHLORIDE SERPL-SCNC: 87 MMOL/L (ref 98–110)
CO2 SERPL-SCNC: 33 MMOL/L (ref 24–31)
CREAT BLD-MCNC: 2.43 MG/DL (ref 0.5–1.4)
DEPRECATED RDW RBC AUTO: 39.7 FL (ref 40–54)
EOSINOPHIL # BLD AUTO: 0.42 10*3/MM3 (ref 0–0.7)
EOSINOPHIL NFR BLD AUTO: 5.2 % (ref 0–4)
ERYTHROCYTE [DISTWIDTH] IN BLOOD BY AUTOMATED COUNT: 12.9 % (ref 12–15)
GFR SERPL CREATININE-BSD FRML MDRD: 27 ML/MIN/1.73
GLOBULIN UR ELPH-MCNC: 3.6 GM/DL
GLUCOSE BLD-MCNC: 91 MG/DL (ref 70–100)
HCT VFR BLD AUTO: 43.5 % (ref 40–52)
HGB BLD-MCNC: 15.8 G/DL (ref 14–18)
HOLD SPECIMEN: NORMAL
HOLD SPECIMEN: NORMAL
IMM GRANULOCYTES # BLD AUTO: 0.04 10*3/MM3 (ref 0–0.03)
IMM GRANULOCYTES NFR BLD AUTO: 0.5 % (ref 0–5)
LIPASE SERPL-CCNC: 38 U/L (ref 23–203)
LYMPHOCYTES # BLD AUTO: 1.93 10*3/MM3 (ref 0.72–4.86)
LYMPHOCYTES NFR BLD AUTO: 23.7 % (ref 15–45)
MCH RBC QN AUTO: 31 PG (ref 28–32)
MCHC RBC AUTO-ENTMCNC: 36.3 G/DL (ref 33–36)
MCV RBC AUTO: 85.3 FL (ref 82–95)
MONOCYTES # BLD AUTO: 0.87 10*3/MM3 (ref 0.19–1.3)
MONOCYTES NFR BLD AUTO: 10.7 % (ref 4–12)
NEUTROPHILS # BLD AUTO: 4.81 10*3/MM3 (ref 1.87–8.4)
NEUTROPHILS NFR BLD AUTO: 59.2 % (ref 39–78)
NRBC BLD AUTO-RTO: 0 /100 WBC (ref 0–0)
PLATELET # BLD AUTO: 339 10*3/MM3 (ref 130–400)
PMV BLD AUTO: 11.3 FL (ref 6–12)
POTASSIUM BLD-SCNC: 3.1 MMOL/L (ref 3.5–5.3)
PROT SERPL-MCNC: 8.3 G/DL (ref 6.3–8.7)
RBC # BLD AUTO: 5.1 10*6/MM3 (ref 4.8–5.9)
SODIUM BLD-SCNC: 136 MMOL/L (ref 135–145)
WBC NRBC COR # BLD: 8.13 10*3/MM3 (ref 4.8–10.8)
WHOLE BLOOD HOLD SPECIMEN: NORMAL
WHOLE BLOOD HOLD SPECIMEN: NORMAL

## 2019-01-09 PROCEDURE — 25010000002 ONDANSETRON PER 1 MG: Performed by: EMERGENCY MEDICINE

## 2019-01-09 PROCEDURE — 70450 CT HEAD/BRAIN W/O DYE: CPT

## 2019-01-09 PROCEDURE — 80053 COMPREHEN METABOLIC PANEL: CPT | Performed by: EMERGENCY MEDICINE

## 2019-01-09 PROCEDURE — 36415 COLL VENOUS BLD VENIPUNCTURE: CPT | Performed by: EMERGENCY MEDICINE

## 2019-01-09 PROCEDURE — 99284 EMERGENCY DEPT VISIT MOD MDM: CPT

## 2019-01-09 PROCEDURE — 96374 THER/PROPH/DIAG INJ IV PUSH: CPT

## 2019-01-09 PROCEDURE — 83690 ASSAY OF LIPASE: CPT | Performed by: EMERGENCY MEDICINE

## 2019-01-09 PROCEDURE — 72125 CT NECK SPINE W/O DYE: CPT

## 2019-01-09 PROCEDURE — 74176 CT ABD & PELVIS W/O CONTRAST: CPT

## 2019-01-09 PROCEDURE — 85025 COMPLETE CBC W/AUTO DIFF WBC: CPT | Performed by: EMERGENCY MEDICINE

## 2019-01-09 PROCEDURE — 96375 TX/PRO/DX INJ NEW DRUG ADDON: CPT

## 2019-01-09 RX ORDER — SODIUM CHLORIDE 0.9 % (FLUSH) 0.9 %
10 SYRINGE (ML) INJECTION AS NEEDED
Status: DISCONTINUED | OUTPATIENT
Start: 2019-01-09 | End: 2019-01-09 | Stop reason: HOSPADM

## 2019-01-09 RX ORDER — MEPERIDINE HYDROCHLORIDE 25 MG/ML
25 INJECTION INTRAMUSCULAR; INTRAVENOUS; SUBCUTANEOUS ONCE
Status: COMPLETED | OUTPATIENT
Start: 2019-01-09 | End: 2019-01-09

## 2019-01-09 RX ORDER — ONDANSETRON 2 MG/ML
4 INJECTION INTRAMUSCULAR; INTRAVENOUS ONCE
Status: COMPLETED | OUTPATIENT
Start: 2019-01-09 | End: 2019-01-09

## 2019-01-09 RX ADMIN — MEPERIDINE HYDROCHLORIDE 25 MG: 25 INJECTION INTRAMUSCULAR; INTRAVENOUS; SUBCUTANEOUS at 12:11

## 2019-01-09 RX ADMIN — ONDANSETRON 4 MG: 2 INJECTION INTRAMUSCULAR; INTRAVENOUS at 12:11

## 2019-01-16 ENCOUNTER — OFFICE VISIT (OUTPATIENT)
Dept: GASTROENTEROLOGY | Facility: CLINIC | Age: 64
End: 2019-01-16

## 2019-01-16 ENCOUNTER — HOSPITAL ENCOUNTER (OUTPATIENT)
Dept: GENERAL RADIOLOGY | Facility: HOSPITAL | Age: 64
Discharge: HOME | End: 2019-01-16
Admitting: CLINICAL NURSE SPECIALIST

## 2019-01-16 VITALS
OXYGEN SATURATION: 95 % | WEIGHT: 305 LBS | BODY MASS INDEX: 41.31 KG/M2 | HEART RATE: 120 BPM | DIASTOLIC BLOOD PRESSURE: 72 MMHG | SYSTOLIC BLOOD PRESSURE: 130 MMHG | HEIGHT: 72 IN

## 2019-01-16 DIAGNOSIS — N18.9 CHRONIC KIDNEY DISEASE, UNSPECIFIED CKD STAGE: ICD-10-CM

## 2019-01-16 DIAGNOSIS — Z78.9 NONSMOKER: ICD-10-CM

## 2019-01-16 DIAGNOSIS — R11.2 NAUSEA AND VOMITING, INTRACTABILITY OF VOMITING NOT SPECIFIED, UNSPECIFIED VOMITING TYPE: ICD-10-CM

## 2019-01-16 DIAGNOSIS — K59.01 SLOW TRANSIT CONSTIPATION: Primary | ICD-10-CM

## 2019-01-16 DIAGNOSIS — E66.9 OBESITY, UNSPECIFIED OBESITY SEVERITY, UNSPECIFIED OBESITY TYPE: ICD-10-CM

## 2019-01-16 PROCEDURE — 74018 RADEX ABDOMEN 1 VIEW: CPT

## 2019-01-16 PROCEDURE — 99213 OFFICE O/P EST LOW 20 MIN: CPT | Performed by: CLINICAL NURSE SPECIALIST

## 2019-01-16 NOTE — PROGRESS NOTES
Erick Luong  1955 1/16/2019  Chief Complaint   Patient presents with   • GI Problem     Abdominal pain     Subjective   HPI  Erick Luong is a 63 y.o. male who presents with a complaint of abdominal pain that led him to the ER on 1/9/2019. It was persistent for approx 2 weeks with nausea and vomiting. He has underlying chronic constipation. His wife tells me that he had an impaction approx 2 weeks ago she treated with fleets enema. He has had an impaction just before the episode leading him tot he ER with severe pain generalized throughout starting on the left side. He was treated with antibiotics and no improvement. He has only had small hard BMs. He is taking Linzess but not helping at this time. He today says he has not had a good BM in 4 days. He has CKD with creatinine 2.43 BUN 52 in the ER. Potassium was 3.1 he is having hypotension as well.   Workup included CT without contrast showing no acute abnormality. Ct of the Head without showed no acute intracranial abnormality. WBC normal.    Past Medical History:   Diagnosis Date   • Arthritis    • CHF (congestive heart failure) (CMS/HCC)    • Coronary artery disease    • Diabetes mellitus (CMS/HCC)    • Hyperlipidemia    • Hypertension    • Myocardial infarction (CMS/HCC)    • Pancreatitis    • Renal disorder    • Sleep apnea with use of continuous positive airway pressure (CPAP)      Past Surgical History:   Procedure Laterality Date   • ABDOMINAL SURGERY     • APPENDECTOMY     • BACK SURGERY     • CARDIAC SURGERY     • CATARACT EXTRACTION     • CERVICAL SPINE SURGERY     • COLONOSCOPY W/ POLYPECTOMY  03/04/2014    Hyperplastic polyp   • COLONOSCOPY WITH ANESTHESIA N/A 1/31/2017    Performed by Cristopher Hannah MD at [facility] ENDOSCOPY   • CORONARY ARTERY BYPASS GRAFT  10/2015   • ENDOSCOPY  04/13/2011    Gastritis with hemorrhage   • ESOPHAGOGASTRODUODENOSCOPY WITH ANESTHESIA N/A 5/5/2017    Performed by Cristopher Hannah MD at [facility] ENDOSCOPY   •  "INCISION AND DRAINAGE OF WOUND Left 09/2007    spider bite       Outpatient Medications Marked as Taking for the 1/16/19 encounter (Office Visit) with Yuliana Vinson APRN   Medication Sig Dispense Refill   • allopurinol (ZYLOPRIM) 100 MG tablet Take 100 mg by mouth Daily.     • amitriptyline (ELAVIL) 50 MG tablet Take 50 mg by mouth Every Night.     • aspirin 81 MG EC tablet Take 81 mg by mouth Daily.     • escitalopram (LEXAPRO) 10 MG tablet Take 10 mg by mouth Daily.     • insulin regular (humuLIN R) 500 UNIT/ML CONCENTRATED injection Inject 100 Units under the skin 3 (Three) Times a Day Before Meals. Packaging states 100 units with regular meals; 120 units with large meals or 360 units daily     • irbesartan (AVAPRO) 300 MG tablet Take 300 mg by mouth Daily.     • isosorbide mononitrate (IMDUR) 30 MG 24 hr tablet Take 30 mg by mouth Daily.     • linaclotide (LINZESS) 290 MCG capsule capsule Take 290 mcg by mouth Every Morning Before Breakfast.     • metOLazone (ZAROXOLYN) 2.5 MG tablet Take 2.5 mg by mouth Daily.     • nebivolol (BYSTOLIC) 5 MG tablet Take 5 mg by mouth Daily.     • ondansetron ODT (ZOFRAN-ODT) 4 MG disintegrating tablet Take 4 mg by mouth 4 (Four) Times a Day As Needed for Nausea or Vomiting.     • oxyCODONE-acetaminophen (PERCOCET)  MG per tablet Take 1 tablet by mouth 2 (Two) Times a Day With Meals.     • pantoprazole (PROTONIX) 40 MG EC tablet Take 1 tablet by mouth Daily. 90 tablet 3   • potassium chloride (K-DUR) 10 MEQ CR tablet Take 10 mEq by mouth Daily.     • rosuvastatin (CRESTOR) 40 MG tablet Take 40 mg by mouth Daily.     • traZODone (DESYREL) 50 MG tablet Take 50 mg by mouth At Night As Needed for Sleep.     • vitamin D (ERGOCALCIFEROL) 92462 units capsule capsule Take 50,000 Units by mouth 1 (One) Time Per Week.       Allergies   Allergen Reactions   • Bactrim [Sulfamethoxazole-Trimethoprim] Other (See Comments)     \"RENAL FAILURE\"     Social History     Socioeconomic " History   • Marital status:      Spouse name: Not on file   • Number of children: Not on file   • Years of education: Not on file   • Highest education level: Not on file   Social Needs   • Financial resource strain: Not on file   • Food insecurity - worry: Not on file   • Food insecurity - inability: Not on file   • Transportation needs - medical: Not on file   • Transportation needs - non-medical: Not on file   Occupational History   • Not on file   Tobacco Use   • Smoking status: Never Smoker   • Smokeless tobacco: Never Used   • Tobacco comment: smoked in highschool   Substance and Sexual Activity   • Alcohol use: No   • Drug use: No   • Sexual activity: Not on file   Other Topics Concern   • Not on file   Social History Narrative   • Not on file     Family History   Problem Relation Age of Onset   • Colon cancer Father    • Heart disease Father    • Colon cancer Sister    • Colon polyps Sister    • Alzheimer's disease Mother    • Coronary artery disease Sister    • Coronary artery disease Sister      Health Maintenance   Topic Date Due   • PNEUMOCOCCAL VACCINE (19-64 MEDIUM RISK) (1 of 1 - PPSV23) 06/18/1975   • TDAP/TD VACCINES (1 - Tdap) 06/18/1975   • ZOSTER VACCINE (1 of 2) 06/18/2006   • MEDICARE ANNUAL WELLNESS  03/29/2017   • DIABETIC FOOT EXAM  03/29/2017   • DIABETIC EYE EXAM  03/29/2017   • INFLUENZA VACCINE  08/01/2018   • HEMOGLOBIN A1C  05/05/2019   • URINE MICROALBUMIN  07/27/2019   • LIPID PANEL  11/05/2019   • COLONOSCOPY  01/31/2027   • HEPATITIS C SCREENING  Completed     Review of Systems   Constitutional: Negative for activity change, appetite change, chills, diaphoresis, fatigue, fever and unexpected weight change.   HENT: Negative for ear pain, hearing loss, mouth sores, sore throat, trouble swallowing and voice change.    Eyes: Negative.    Respiratory: Negative for cough, choking, shortness of breath and wheezing.    Cardiovascular: Negative for chest pain and palpitations.  "  Gastrointestinal: Positive for constipation. Negative for abdominal pain, blood in stool, diarrhea, nausea and vomiting.   Endocrine: Negative for cold intolerance and heat intolerance.   Genitourinary: Negative for decreased urine volume, dysuria, frequency, hematuria and urgency.   Musculoskeletal: Negative for back pain, gait problem and myalgias.   Skin: Negative for color change, pallor and rash.   Allergic/Immunologic: Negative for food allergies and immunocompromised state.   Neurological: Negative for dizziness, tremors, seizures, syncope, weakness, light-headedness, numbness and headaches.   Hematological: Negative for adenopathy. Does not bruise/bleed easily.   Psychiatric/Behavioral: Negative for agitation and confusion. The patient is not nervous/anxious.    All other systems reviewed and are negative.    Objective   Vitals:    01/16/19 1250   BP: 130/72   Pulse: 120   SpO2: 95%   Weight: (!) 138 kg (305 lb)   Height: 182.9 cm (72\")     Body mass index is 41.37 kg/m².  Physical Exam   Constitutional: He is oriented to person, place, and time. He appears well-developed and well-nourished.   HENT:   Head: Normocephalic and atraumatic.   Eyes: Pupils are equal, round, and reactive to light.   Neck: Normal range of motion. Neck supple. No tracheal deviation present.   Cardiovascular: Normal rate, regular rhythm and normal heart sounds. Exam reveals no gallop and no friction rub.   No murmur heard.  Pulmonary/Chest: Effort normal and breath sounds normal. No respiratory distress. He has no wheezes. He has no rales. He exhibits no tenderness.   Abdominal: Soft. Bowel sounds are normal. He exhibits no distension. There is no hepatosplenomegaly. There is no tenderness. There is no rigidity, no rebound and no guarding.   Musculoskeletal: Normal range of motion. He exhibits no edema, tenderness or deformity.   Neurological: He is alert and oriented to person, place, and time. He has normal reflexes.   Skin: Skin " is warm and dry. No rash noted. No pallor.   Psychiatric: He has a normal mood and affect. His behavior is normal. Judgment and thought content normal.     Assessment/Plan   Erick was seen today for gi problem.    Diagnoses and all orders for this visit:    Slow transit constipation  -     XR Abdomen KUB  -     polyethylene glycol (GoLYTELY) 236 g solution; Take as directed by office instructions.    Nausea and vomiting, intractability of vomiting not specified, unspecified vomiting type    Nonsmoker    Obesity, unspecified obesity severity, unspecified obesity type    Chronic kidney disease, unspecified CKD stage    I feel that a lot of his abdominal discomfort and pain is coming from his severe constipation we have discussed this today. He has had multiple CT scans in the past and I have reviewed his medical record today to include his most recent visit to the ER. He also has progressive CKD and I want him to complete workup with his renal specialist as well. I will clean him out conservatively. I have suggested we get KUB first and drink 1/4 of Golytely with a tap water enema until I get his most recent labs from Dr Alvarez. No more fleets which she did approx 2 weeks ago for impaction.     1/30/2019 10:55 AM I have received labs and instructed him to no more Golytely prep and to treat constipation as conservatively as possible. He verbalizes and agrees. He has not had any results yet. He has had to take Lactulose before for severe constipation.     EMR Dragon/transcription disclaimer: Much of this encounter note is electronic transcription/translation of spoken language to printed text. The electronic translation of spoken language may be erroneous, or at times, nonsensical words or phrases may be inadvertently transcribed. Although I have reviewed the note for such errors, some may still exist.  Body mass index is 41.37 kg/m².  No Follow-up on file.    Patient's Body mass index is 41.37 kg/m². BMI is above  normal parameters. Recommendations include: nutrition counseling.      All risks, benefits, alternatives, and indications of colonoscopy and/or Endoscopy procedure have been discussed with the patient. Risks to include perforation of the colon requiring possible surgery or colostomy, risk of bleeding from biopsies or removal of colon tissue, possibility of missing a colon polyp or cancer, or adverse drug reaction.  Benefits to include the diagnosis and management of disease of the colon and rectum. Alternatives to include barium enema, radiographic evaluation, lab testing or no intervention. Pt verbalizes understanding and agrees.     Yuliana Vinson, APRN  1/16/2019  3:58 PM      Obesity, Adult  Obesity is the condition of having too much total body fat. Being overweight or obese means that your weight is greater than what is considered healthy for your body size. Obesity is determined by a measurement called BMI. BMI is an estimate of body fat and is calculated from height and weight. For adults, a BMI of 30 or higher is considered obese.  Obesity can eventually lead to other health concerns and major illnesses, including:  · Stroke.  · Coronary artery disease (CAD).  · Type 2 diabetes.  · Some types of cancer, including cancers of the colon, breast, uterus, and gallbladder.  · Osteoarthritis.  · High blood pressure (hypertension).  · High cholesterol.  · Sleep apnea.  · Gallbladder stones.  · Infertility problems.  What are the causes?  The main cause of obesity is taking in (consuming) more calories than your body uses for energy. Other factors that contribute to this condition may include:  · Being born with genes that make you more likely to become obese.  · Having a medical condition that causes obesity. These conditions include:  ¨ Hypothyroidism.  ¨ Polycystic ovarian syndrome (PCOS).  ¨ Binge-eating disorder.  ¨ Cushing syndrome.  · Taking certain medicines, such as steroids, antidepressants, and  seizure medicines.  · Not being physically active (sedentary lifestyle).  · Living where there are limited places to exercise safely or buy healthy foods.  · Not getting enough sleep.  What increases the risk?  The following factors may increase your risk of this condition:  · Having a family history of obesity.  · Being a woman of -American descent.  · Being a man of  descent.  What are the signs or symptoms?  Having excessive body fat is the main symptom of this condition.  How is this diagnosed?  This condition may be diagnosed based on:  · Your symptoms.  · Your medical history.  · A physical exam. Your health care provider may measure:  ¨ Your BMI. If you are an adult with a BMI between 25 and less than 30, you are considered overweight. If you are an adult with a BMI of 30 or higher, you are considered obese.  ¨ The distances around your hips and your waist (circumferences). These may be compared to each other to help diagnose your condition.  ¨ Your skinfold thickness. Your health care provider may gently pinch a fold of your skin and measure it.  How is this treated?  Treatment for this condition often includes changing your lifestyle. Treatment may include some or all of the following:  · Dietary changes. Work with your health care provider and a dietitian to set a weight-loss goal that is healthy and reasonable for you. Dietary changes may include eating:  ¨ Smaller portions. A portion size is the amount of a particular food that is healthy for you to eat at one time. This varies from person to person.  ¨ Low-calorie or low-fat options.  ¨ More whole grains, fruits, and vegetables.  · Regular physical activity. This may include aerobic activity (cardio) and strength training.  · Medicine to help you lose weight. Your health care provider may prescribe medicine if you are unable to lose 1 pound a week after 6 weeks of eating more healthily and doing more physical activity.  · Surgery.  Surgical options may include gastric banding and gastric bypass. Surgery may be done if:  ¨ Other treatments have not helped to improve your condition.  ¨ You have a BMI of 40 or higher.  ¨ You have life-threatening health problems related to obesity.  Follow these instructions at home:     Eating and drinking     · Follow recommendations from your health care provider about what you eat and drink. Your health care provider may advise you to:  ¨ Limit fast foods, sweets, and processed snack foods.  ¨ Choose low-fat options, such as low-fat milk instead of whole milk.  ¨ Eat 5 or more servings of fruits or vegetables every day.  ¨ Eat at home more often. This gives you more control over what you eat.  ¨ Choose healthy foods when you eat out.  ¨ Learn what a healthy portion size is.  ¨ Keep low-fat snacks on hand.  ¨ Avoid sugary drinks, such as soda, fruit juice, iced tea sweetened with sugar, and flavored milk.  ¨ Eat a healthy breakfast.  · Drink enough water to keep your urine clear or pale yellow.  · Do not go without eating for long periods of time (do not fast) or follow a fad diet. Fasting and fad diets can be unhealthy and even dangerous.  Physical Activity   · Exercise regularly, as told by your health care provider. Ask your health care provider what types of exercise are safe for you and how often you should exercise.  · Warm up and stretch before being active.  · Cool down and stretch after being active.  · Rest between periods of activity.  Lifestyle   · Limit the time that you spend in front of your TV, computer, or video game system.  · Find ways to reward yourself that do not involve food.  · Limit alcohol intake to no more than 1 drink a day for nonpregnant women and 2 drinks a day for men. One drink equals 12 oz of beer, 5 oz of wine, or 1½ oz of hard liquor.  General instructions   · Keep a weight loss journal to keep track of the food you eat and how much you exercise you get.  · Take  over-the-counter and prescription medicines only as told by your health care provider.  · Take vitamins and supplements only as told by your health care provider.  · Consider joining a support group. Your health care provider may be able to recommend a support group.  · Keep all follow-up visits as told by your health care provider. This is important.  Contact a health care provider if:  · You are unable to meet your weight loss goal after 6 weeks of dietary and lifestyle changes.  This information is not intended to replace advice given to you by your health care provider. Make sure you discuss any questions you have with your health care provider.  Document Released: 01/25/2006 Document Revised: 05/22/2017 Document Reviewed: 10/05/2016  [employer] Interactive Patient Education © 2017 [employer]      If you smoke or use tobacco, 4 minutes reading provided  Steps to Quit Smoking  Smoking tobacco can be harmful to your health and can affect almost every organ in your body. Smoking puts you, and those around you, at risk for developing many serious chronic diseases. Quitting smoking is difficult, but it is one of the best things that you can do for your health. It is never too late to quit.  What are the benefits of quitting smoking?  When you quit smoking, you lower your risk of developing serious diseases and conditions, such as:  · Lung cancer or lung disease, such as COPD.  · Heart disease.  · Stroke.  · Heart attack.  · Infertility.  · Osteoporosis and bone fractures.  Additionally, symptoms such as coughing, wheezing, and shortness of breath may get better when you quit. You may also find that you get sick less often because your body is stronger at fighting off colds and infections. If you are pregnant, quitting smoking can help to reduce your chances of having a baby of low birth weight.  How do I get ready to quit?  When you decide to quit smoking, create a plan to make sure that you are successful. Before  you quit:  · Pick a date to quit. Set a date within the next two weeks to give you time to prepare.  · Write down the reasons why you are quitting. Keep this list in places where you will see it often, such as on your bathroom mirror or in your car or wallet.  · Identify the people, places, things, and activities that make you want to smoke (triggers) and avoid them. Make sure to take these actions:  ¨ Throw away all cigarettes at home, at work, and in your car.  ¨ Throw away smoking accessories, such as ashtrays and lighters.  ¨ Clean your car and make sure to empty the ashtray.  ¨ Clean your home, including curtains and carpets.  · Tell your family, friends, and coworkers that you are quitting. Support from your loved ones can make quitting easier.  · Talk with your health care provider about your options for quitting smoking.  · Find out what treatment options are covered by your health insurance.  What strategies can I use to quit smoking?  Talk with your healthcare provider about different strategies to quit smoking. Some strategies include:  · Quitting smoking altogether instead of gradually lessening how much you smoke over a period of time. Research shows that quitting “cold turkey” is more successful than gradually quitting.  · Attending in-person counseling to help you build problem-solving skills. You are more likely to have success in quitting if you attend several counseling sessions. Even short sessions of 10 minutes can be effective.  · Finding resources and support systems that can help you to quit smoking and remain smoke-free after you quit. These resources are most helpful when you use them often. They can include:  ¨ Online chats with a counselor.  ¨ Telephone quitlines.  ¨ Printed self-help materials.  ¨ Support groups or group counseling.  ¨ Text messaging programs.  ¨ Mobile phone applications.  · Taking medicines to help you quit smoking. (If you are pregnant or breastfeeding, talk with your  health care provider first.) Some medicines contain nicotine and some do not. Both types of medicines help with cravings, but the medicines that include nicotine help to relieve withdrawal symptoms. Your health care provider may recommend:  ¨ Nicotine patches, gum, or lozenges.  ¨ Nicotine inhalers or sprays.  ¨ Non-nicotine medicine that is taken by mouth.  Talk with your health care provider about combining strategies, such as taking medicines while you are also receiving in-person counseling. Using these two strategies together makes you more likely to succeed in quitting than if you used either strategy on its own.  If you are pregnant or breastfeeding, talk with your health care provider about finding counseling or other support strategies to quit smoking. Do not take medicine to help you quit smoking unless told to do so by your health care provider.  What things can I do to make it easier to quit?  Quitting smoking might feel overwhelming at first, but there is a lot that you can do to make it easier. Take these important actions:  · Reach out to your family and friends and ask that they support and encourage you during this time. Call telephone quitlines, reach out to support groups, or work with a counselor for support.  · Ask people who smoke to avoid smoking around you.  · Avoid places that trigger you to smoke, such as bars, parties, or smoke-break areas at work.  · Spend time around people who do not smoke.  · Lessen stress in your life, because stress can be a smoking trigger for some people. To lessen stress, try:  ¨ Exercising regularly.  ¨ Deep-breathing exercises.  ¨ Yoga.  ¨ Meditating.  ¨ Performing a body scan. This involves closing your eyes, scanning your body from head to toe, and noticing which parts of your body are particularly tense. Purposefully relax the muscles in those areas.  · Download or purchase mobile phone or tablet apps (applications) that can help you stick to your quit plan  by providing reminders, tips, and encouragement. There are many free apps, such as QuitGuide from the CDC (Centers for Disease Control and Prevention). You can find other support for quitting smoking (smoking cessation) through smokefree.gov and other websites.  How will I feel when I quit smoking?  Within the first 24 hours of quitting smoking, you may start to feel some withdrawal symptoms. These symptoms are usually most noticeable 2-3 days after quitting, but they usually do not last beyond 2-3 weeks. Changes or symptoms that you might experience include:  · Mood swings.  · Restlessness, anxiety, or irritation.  · Difficulty concentrating.  · Dizziness.  · Strong cravings for sugary foods in addition to nicotine.  · Mild weight gain.  · Constipation.  · Nausea.  · Coughing or a sore throat.  · Changes in how your medicines work in your body.  · A depressed mood.  · Difficulty sleeping (insomnia).  After the first 2-3 weeks of quitting, you may start to notice more positive results, such as:  · Improved sense of smell and taste.  · Decreased coughing and sore throat.  · Slower heart rate.  · Lower blood pressure.  · Clearer skin.  · The ability to breathe more easily.  · Fewer sick days.  Quitting smoking is very challenging for most people. Do not get discouraged if you are not successful the first time. Some people need to make many attempts to quit before they achieve long-term success. Do your best to stick to your quit plan, and talk with your health care provider if you have any questions or concerns.  This information is not intended to replace advice given to you by your health care provider. Make sure you discuss any questions you have with your health care provider.  Document Released: 12/12/2002 Document Revised: 08/15/2017 Document Reviewed: 05/03/2016  [employer] Interactive Patient Education © 2017 [employer]

## 2019-01-17 ENCOUNTER — TELEPHONE (OUTPATIENT)
Dept: GASTROENTEROLOGY | Facility: CLINIC | Age: 64
End: 2019-01-17

## 2019-01-17 NOTE — TELEPHONE ENCOUNTER
Tell her I called Erick this am and checked on him. He needs to get with renal about his worsening creatinine he does not need to take any more Golytely. He can do Miralax and Lactulose or tap water enema to get large amount of stool removed.   Thanks   Maritza

## 2019-01-17 NOTE — TELEPHONE ENCOUNTER
Pts spouse called, did a 4th of the golytley, tap water enema, and hot cocoa last night. He did not have a bowel movement.

## 2019-01-22 ENCOUNTER — APPOINTMENT (OUTPATIENT)
Dept: LAB | Facility: HOSPITAL | Age: 64
End: 2019-01-22

## 2019-01-22 ENCOUNTER — TRANSCRIBE ORDERS (OUTPATIENT)
Dept: ADMINISTRATIVE | Facility: HOSPITAL | Age: 64
End: 2019-01-22

## 2019-01-22 DIAGNOSIS — N18.30 CHRONIC KIDNEY DISEASE, STAGE III (MODERATE) (HCC): Primary | ICD-10-CM

## 2019-01-22 LAB
ANION GAP SERPL CALCULATED.3IONS-SCNC: ABNORMAL MMOL/L (ref 4–13)
BUN BLD-MCNC: 46 MG/DL (ref 5–21)
BUN/CREAT SERPL: 18.9 (ref 7–25)
CALCIUM SPEC-SCNC: 8.9 MG/DL (ref 8.4–10.4)
CHLORIDE SERPL-SCNC: 76 MMOL/L (ref 98–110)
CO2 SERPL-SCNC: >40 MMOL/L (ref 24–31)
CREAT BLD-MCNC: 2.43 MG/DL (ref 0.5–1.4)
GFR SERPL CREATININE-BSD FRML MDRD: 27 ML/MIN/1.73
GLUCOSE BLD-MCNC: 275 MG/DL (ref 70–100)
POTASSIUM BLD-SCNC: 3.8 MMOL/L (ref 3.5–5.3)
SODIUM BLD-SCNC: 131 MMOL/L (ref 135–145)

## 2019-01-22 PROCEDURE — 80048 BASIC METABOLIC PNL TOTAL CA: CPT | Performed by: NURSE PRACTITIONER

## 2019-01-22 PROCEDURE — 36415 COLL VENOUS BLD VENIPUNCTURE: CPT | Performed by: NURSE PRACTITIONER

## 2019-01-22 PROCEDURE — 85027 COMPLETE CBC AUTOMATED: CPT | Performed by: FAMILY MEDICINE

## 2019-01-23 ENCOUNTER — TRANSCRIBE ORDERS (OUTPATIENT)
Dept: ADMINISTRATIVE | Facility: HOSPITAL | Age: 64
End: 2019-01-23

## 2019-01-23 DIAGNOSIS — K59.09 OTHER CONSTIPATION: Primary | ICD-10-CM

## 2019-01-23 DIAGNOSIS — R10.84 ABDOMINAL PAIN, GENERALIZED: ICD-10-CM

## 2019-01-23 LAB
DEPRECATED RDW RBC AUTO: 46.3 FL (ref 40–54)
ERYTHROCYTE [DISTWIDTH] IN BLOOD BY AUTOMATED COUNT: 13.9 % (ref 12–15)
HCT VFR BLD AUTO: 45.5 % (ref 40–52)
HGB BLD-MCNC: 14.6 G/DL (ref 14–18)
MCH RBC QN AUTO: 29.8 PG (ref 28–32)
MCHC RBC AUTO-ENTMCNC: 32.1 G/DL (ref 33–36)
MCV RBC AUTO: 92.9 FL (ref 82–95)
PLATELET # BLD AUTO: 254 10*3/MM3 (ref 130–400)
PMV BLD AUTO: 12.8 FL (ref 6–12)
RBC # BLD AUTO: 4.9 10*6/MM3 (ref 4.8–5.9)
WBC NRBC COR # BLD: 14.01 10*3/MM3 (ref 4.8–10.8)

## 2019-01-25 ENCOUNTER — HOSPITAL ENCOUNTER (OUTPATIENT)
Dept: GENERAL RADIOLOGY | Facility: HOSPITAL | Age: 64
Discharge: HOME | End: 2019-01-25
Admitting: FAMILY MEDICINE

## 2019-01-25 ENCOUNTER — TRANSCRIBE ORDERS (OUTPATIENT)
Dept: ADMINISTRATIVE | Facility: HOSPITAL | Age: 64
End: 2019-01-25

## 2019-01-25 DIAGNOSIS — R10.84 ABDOMINAL PAIN, GENERALIZED: Primary | ICD-10-CM

## 2019-01-25 LAB
BASOPHILS ABSOLUTE: 0.1 K/UL (ref 0–0.2)
BASOPHILS RELATIVE PERCENT: 0.9 % (ref 0–1)
EOSINOPHILS ABSOLUTE: 0.3 K/UL (ref 0–0.6)
EOSINOPHILS RELATIVE PERCENT: 3 % (ref 0–5)
HCT VFR BLD CALC: 40.8 % (ref 42–52)
HEMOGLOBIN: 14.5 G/DL (ref 14–18)
LYMPHOCYTES ABSOLUTE: 1.6 K/UL (ref 1.1–4.5)
LYMPHOCYTES RELATIVE PERCENT: 17.6 % (ref 20–40)
MCH RBC QN AUTO: 30.7 PG (ref 27–31)
MCHC RBC AUTO-ENTMCNC: 35.5 G/DL (ref 33–37)
MCV RBC AUTO: 86.3 FL (ref 80–94)
MONOCYTES ABSOLUTE: 0.9 K/UL (ref 0–0.9)
MONOCYTES RELATIVE PERCENT: 9.5 % (ref 0–10)
NEUTROPHILS ABSOLUTE: 6.3 K/UL (ref 1.5–7.5)
NEUTROPHILS RELATIVE PERCENT: 68.6 % (ref 50–65)
PDW BLD-RTO: 13.1 % (ref 11.5–14.5)
PLATELET # BLD: 274 K/UL (ref 130–400)
PMV BLD AUTO: 11.4 FL (ref 9.4–12.4)
RBC # BLD: 4.73 M/UL (ref 4.7–6.1)
REASON FOR REJECTION: NORMAL
REJECTED TEST: NORMAL
WBC # BLD: 9.1 K/UL (ref 4.8–10.8)

## 2019-01-25 PROCEDURE — 74018 RADEX ABDOMEN 1 VIEW: CPT

## 2019-01-28 ENCOUNTER — TRANSCRIBE ORDERS (OUTPATIENT)
Dept: LAB | Facility: HOSPITAL | Age: 64
End: 2019-01-28

## 2019-01-28 ENCOUNTER — APPOINTMENT (OUTPATIENT)
Dept: LAB | Facility: HOSPITAL | Age: 64
End: 2019-01-28

## 2019-01-28 ENCOUNTER — TELEPHONE (OUTPATIENT)
Dept: CARDIOLOGY | Facility: CLINIC | Age: 64
End: 2019-01-28

## 2019-01-28 DIAGNOSIS — I10 ESSENTIAL HYPERTENSION, BENIGN: Primary | ICD-10-CM

## 2019-01-28 LAB
ALBUMIN SERPL-MCNC: 4.4 G/DL (ref 3.5–5)
ALBUMIN/GLOB SERPL: 1.8 G/DL (ref 1.1–2.5)
ALP SERPL-CCNC: 56 U/L (ref 24–120)
ALT SERPL W P-5'-P-CCNC: 27 U/L (ref 0–54)
ANION GAP SERPL CALCULATED.3IONS-SCNC: 11 MMOL/L (ref 4–13)
AST SERPL-CCNC: 34 U/L (ref 7–45)
BILIRUB SERPL-MCNC: 0.7 MG/DL (ref 0.1–1)
BUN BLD-MCNC: 32 MG/DL (ref 5–21)
BUN/CREAT SERPL: 15.5 (ref 7–25)
CALCIUM SPEC-SCNC: 9.1 MG/DL (ref 8.4–10.4)
CHLORIDE SERPL-SCNC: 88 MMOL/L (ref 98–110)
CO2 SERPL-SCNC: 34 MMOL/L (ref 24–31)
CREAT BLD-MCNC: 2.07 MG/DL (ref 0.5–1.4)
GFR SERPL CREATININE-BSD FRML MDRD: 33 ML/MIN/1.73
GLOBULIN UR ELPH-MCNC: 2.5 GM/DL
GLUCOSE BLD-MCNC: 384 MG/DL (ref 70–100)
POTASSIUM BLD-SCNC: 4.6 MMOL/L (ref 3.5–5.3)
PROT SERPL-MCNC: 6.9 G/DL (ref 6.3–8.7)
SODIUM BLD-SCNC: 133 MMOL/L (ref 135–145)

## 2019-01-28 PROCEDURE — 36415 COLL VENOUS BLD VENIPUNCTURE: CPT

## 2019-01-28 PROCEDURE — 80053 COMPREHEN METABOLIC PANEL: CPT | Performed by: FAMILY MEDICINE

## 2019-01-28 PROCEDURE — 84443 ASSAY THYROID STIM HORMONE: CPT | Performed by: FAMILY MEDICINE

## 2019-01-29 ENCOUNTER — LAB REQUISITION (OUTPATIENT)
Dept: LAB | Facility: HOSPITAL | Age: 64
End: 2019-01-29

## 2019-01-29 DIAGNOSIS — Z00.00 ENCOUNTER FOR GENERAL ADULT MEDICAL EXAMINATION WITHOUT ABNORMAL FINDINGS: ICD-10-CM

## 2019-01-29 LAB
HBA1C MFR BLD: 9.2 %
TSH SERPL DL<=0.05 MIU/L-ACNC: 0.66 MIU/ML (ref 0.47–4.68)

## 2019-01-29 PROCEDURE — 83036 HEMOGLOBIN GLYCOSYLATED A1C: CPT | Performed by: FAMILY MEDICINE

## 2019-01-30 ENCOUNTER — OFFICE VISIT (OUTPATIENT)
Dept: GASTROENTEROLOGY | Facility: CLINIC | Age: 64
End: 2019-01-30

## 2019-01-30 ENCOUNTER — OFFICE VISIT (OUTPATIENT)
Dept: CARDIOLOGY | Facility: CLINIC | Age: 64
End: 2019-01-30

## 2019-01-30 VITALS
HEART RATE: 87 BPM | OXYGEN SATURATION: 98 % | HEIGHT: 72 IN | SYSTOLIC BLOOD PRESSURE: 130 MMHG | WEIGHT: 312 LBS | DIASTOLIC BLOOD PRESSURE: 76 MMHG | BODY MASS INDEX: 42.26 KG/M2

## 2019-01-30 VITALS
SYSTOLIC BLOOD PRESSURE: 148 MMHG | BODY MASS INDEX: 41.99 KG/M2 | WEIGHT: 310 LBS | DIASTOLIC BLOOD PRESSURE: 80 MMHG | HEIGHT: 72 IN | HEART RATE: 112 BPM | OXYGEN SATURATION: 97 %

## 2019-01-30 DIAGNOSIS — I25.10 CORONARY ARTERY DISEASE INVOLVING NATIVE CORONARY ARTERY OF NATIVE HEART WITHOUT ANGINA PECTORIS: ICD-10-CM

## 2019-01-30 DIAGNOSIS — I10 ESSENTIAL HYPERTENSION: ICD-10-CM

## 2019-01-30 DIAGNOSIS — Z78.9 NONSMOKER: ICD-10-CM

## 2019-01-30 DIAGNOSIS — E66.9 OBESITY, UNSPECIFIED OBESITY SEVERITY, UNSPECIFIED OBESITY TYPE: ICD-10-CM

## 2019-01-30 DIAGNOSIS — I95.1 ORTHOSTATIC HYPOTENSION: ICD-10-CM

## 2019-01-30 DIAGNOSIS — K59.01 SLOW TRANSIT CONSTIPATION: Primary | ICD-10-CM

## 2019-01-30 DIAGNOSIS — R55 SYNCOPE AND COLLAPSE: Primary | ICD-10-CM

## 2019-01-30 PROCEDURE — 99214 OFFICE O/P EST MOD 30 MIN: CPT | Performed by: INTERNAL MEDICINE

## 2019-01-30 PROCEDURE — 99212 OFFICE O/P EST SF 10 MIN: CPT | Performed by: CLINICAL NURSE SPECIALIST

## 2019-01-30 RX ORDER — MIDODRINE HYDROCHLORIDE 10 MG/1
10 TABLET ORAL 3 TIMES DAILY
Refills: 0 | COMMUNITY
Start: 2019-01-11 | End: 2019-07-22

## 2019-01-30 NOTE — PROGRESS NOTES
Erick Luong  1955 1/30/2019  Chief Complaint   Patient presents with   • GI Problem     Here to discuss constipation         HPI    Erick Luong is a  63 y.o. male here for a follow up visit for complaint of constipation. He is doing better on Linzess 290 mcg Lactulose. He tells me that he is doing better with this regimen. No other associated symptoms. He is seeing Dr Lomas and he was just diagnosed with autonomic nervous system neuropathy he is doing well with Midrodrine HCL 5mg and doing much better. His creatinine has improved to 2.07. He is up to date with his colonoscopy.     Past Medical History:   Diagnosis Date   • Arthritis    • CHF (congestive heart failure) (CMS/HCC)    • Coronary artery disease    • Diabetes mellitus (CMS/HCC)    • Hyperlipidemia    • Hypertension    • Myocardial infarction (CMS/HCC)    • Pancreatitis    • Renal disorder    • Sleep apnea with use of continuous positive airway pressure (CPAP)      Past Surgical History:   Procedure Laterality Date   • ABDOMINAL SURGERY     • APPENDECTOMY     • BACK SURGERY     • CARDIAC SURGERY     • CATARACT EXTRACTION     • CERVICAL SPINE SURGERY     • COLONOSCOPY W/ POLYPECTOMY  03/04/2014    Hyperplastic polyp   • COLONOSCOPY WITH ANESTHESIA N/A 1/31/2017    Performed by Cristopher Hannah MD at [facility] ENDOSCOPY   • CORONARY ARTERY BYPASS GRAFT  10/2015   • ENDOSCOPY  04/13/2011    Gastritis with hemorrhage   • ESOPHAGOGASTRODUODENOSCOPY WITH ANESTHESIA N/A 5/5/2017    Performed by Cristopher Hannah MD at [facility] ENDOSCOPY   • INCISION AND DRAINAGE OF WOUND Left 09/2007    spider bite       Outpatient Medications Marked as Taking for the 1/30/19 encounter (Office Visit) with Yuliana Vinson APRN   Medication Sig Dispense Refill   • allopurinol (ZYLOPRIM) 100 MG tablet Take 100 mg by mouth Daily.     • amitriptyline (ELAVIL) 50 MG tablet Take 50 mg by mouth Every Night.     • aspirin 81 MG EC tablet Take 81 mg by mouth Daily.     •  "escitalopram (LEXAPRO) 10 MG tablet Take 10 mg by mouth Daily.     • insulin regular (humuLIN R) 500 UNIT/ML CONCENTRATED injection Inject 100 Units under the skin 3 (Three) Times a Day Before Meals. Packaging states 100 units with regular meals; 120 units with large meals or 360 units daily     • isosorbide mononitrate (IMDUR) 30 MG 24 hr tablet Take 30 mg by mouth Daily.     • linaclotide (LINZESS) 290 MCG capsule capsule Take 290 mcg by mouth Every Morning Before Breakfast.     • metOLazone (ZAROXOLYN) 2.5 MG tablet Take 2.5 mg by mouth Daily.     • midodrine (PROAMATINE) 5 MG tablet Take 5 mg by mouth 3 (Three) Times a Day.  0   • nebivolol (BYSTOLIC) 5 MG tablet Take 5 mg by mouth Daily.     • ondansetron ODT (ZOFRAN-ODT) 4 MG disintegrating tablet Take 4 mg by mouth 4 (Four) Times a Day As Needed for Nausea or Vomiting.     • oxyCODONE-acetaminophen (PERCOCET)  MG per tablet Take 1 tablet by mouth 2 (Two) Times a Day With Meals.     • pantoprazole (PROTONIX) 40 MG EC tablet Take 1 tablet by mouth Daily. 90 tablet 3   • polyethylene glycol (GoLYTELY) 236 g solution Take as directed by office instructions. 4000 mL 0   • rosuvastatin (CRESTOR) 40 MG tablet Take 40 mg by mouth Daily.         Allergies   Allergen Reactions   • Bactrim [Sulfamethoxazole-Trimethoprim] Other (See Comments)     \"RENAL FAILURE\"       Social History     Socioeconomic History   • Marital status:      Spouse name: Not on file   • Number of children: Not on file   • Years of education: Not on file   • Highest education level: Not on file   Social Needs   • Financial resource strain: Not on file   • Food insecurity - worry: Not on file   • Food insecurity - inability: Not on file   • Transportation needs - medical: Not on file   • Transportation needs - non-medical: Not on file   Occupational History   • Not on file   Tobacco Use   • Smoking status: Never Smoker   • Smokeless tobacco: Never Used   • Tobacco comment: smoked in " "highschool   Substance and Sexual Activity   • Alcohol use: No   • Drug use: No   • Sexual activity: Not on file   Other Topics Concern   • Not on file   Social History Narrative   • Not on file       Family History   Problem Relation Age of Onset   • Colon cancer Father    • Heart disease Father    • Colon cancer Sister    • Colon polyps Sister    • Alzheimer's disease Mother    • Coronary artery disease Sister    • Coronary artery disease Sister        Review of Systems   Constitutional: Negative for activity change, appetite change, chills, diaphoresis, fatigue, fever and unexpected weight change.   HENT: Negative for ear pain, hearing loss, mouth sores, sore throat, trouble swallowing and voice change.    Eyes: Negative.    Respiratory: Negative for cough, choking, shortness of breath and wheezing.    Cardiovascular: Negative for chest pain and palpitations.   Gastrointestinal: Positive for constipation. Negative for abdominal pain, blood in stool, diarrhea, nausea and vomiting.   Endocrine: Negative for cold intolerance and heat intolerance.   Genitourinary: Negative for decreased urine volume, dysuria, frequency, hematuria and urgency.   Musculoskeletal: Negative for back pain, gait problem and myalgias.   Skin: Negative for color change, pallor and rash.   Allergic/Immunologic: Negative for food allergies and immunocompromised state.   Neurological: Negative for dizziness, tremors, seizures, syncope, weakness, light-headedness, numbness and headaches.   Hematological: Negative for adenopathy. Does not bruise/bleed easily.   Psychiatric/Behavioral: Negative for agitation and confusion. The patient is not nervous/anxious.    All other systems reviewed and are negative.      /80   Pulse 112   Ht 182.9 cm (72\")   Wt (!) 141 kg (310 lb)   SpO2 97%   BMI 42.04 kg/m²   Body mass index is 42.04 kg/m².    Physical Exam   Constitutional: He is oriented to person, place, and time. He appears well-developed " and well-nourished.   HENT:   Head: Normocephalic and atraumatic.   Eyes: Pupils are equal, round, and reactive to light.   Neck: Normal range of motion. Neck supple. No tracheal deviation present.   Cardiovascular: Normal rate, regular rhythm and normal heart sounds. Exam reveals no gallop and no friction rub.   No murmur heard.  Pulmonary/Chest: Effort normal and breath sounds normal. No respiratory distress. He has no wheezes. He has no rales. He exhibits no tenderness.   Abdominal: Soft. Bowel sounds are normal. He exhibits no distension. There is no hepatosplenomegaly. There is no tenderness. There is no rigidity, no rebound and no guarding.   Musculoskeletal: Normal range of motion. He exhibits no edema, tenderness or deformity.   Neurological: He is alert and oriented to person, place, and time. He has normal reflexes.   Skin: Skin is warm and dry. No rash noted. No pallor.   Psychiatric: He has a normal mood and affect. His behavior is normal. Judgment and thought content normal.       ASSESSMENT AND PLAN    Patient's Body mass index is 42.04 kg/m². BMI is above normal parameters. Recommendations include: nutrition counseling.    Erick was seen today for gi problem.    Diagnoses and all orders for this visit:    Slow transit constipation  Comments:  cont current regimen     Obesity, unspecified obesity severity, unspecified obesity type    Nonsmoker          There are no Patient Instructions on file for this visit.  Yuliana Vinson, APRN  2:05 PM  1/30/2019    Obesity, Adult  Obesity is the condition of having too much total body fat. Being overweight or obese means that your weight is greater than what is considered healthy for your body size. Obesity is determined by a measurement called BMI. BMI is an estimate of body fat and is calculated from height and weight. For adults, a BMI of 30 or higher is considered obese.  Obesity can eventually lead to other health concerns and major illnesses,  including:  · Stroke.  · Coronary artery disease (CAD).  · Type 2 diabetes.  · Some types of cancer, including cancers of the colon, breast, uterus, and gallbladder.  · Osteoarthritis.  · High blood pressure (hypertension).  · High cholesterol.  · Sleep apnea.  · Gallbladder stones.  · Infertility problems.  What are the causes?  The main cause of obesity is taking in (consuming) more calories than your body uses for energy. Other factors that contribute to this condition may include:  · Being born with genes that make you more likely to become obese.  · Having a medical condition that causes obesity. These conditions include:  ¨ Hypothyroidism.  ¨ Polycystic ovarian syndrome (PCOS).  ¨ Binge-eating disorder.  ¨ Cushing syndrome.  · Taking certain medicines, such as steroids, antidepressants, and seizure medicines.  · Not being physically active (sedentary lifestyle).  · Living where there are limited places to exercise safely or buy healthy foods.  · Not getting enough sleep.  What increases the risk?  The following factors may increase your risk of this condition:  · Having a family history of obesity.  · Being a woman of -American descent.  · Being a man of  descent.  What are the signs or symptoms?  Having excessive body fat is the main symptom of this condition.  How is this diagnosed?  This condition may be diagnosed based on:  · Your symptoms.  · Your medical history.  · A physical exam. Your health care provider may measure:  ¨ Your BMI. If you are an adult with a BMI between 25 and less than 30, you are considered overweight. If you are an adult with a BMI of 30 or higher, you are considered obese.  ¨ The distances around your hips and your waist (circumferences). These may be compared to each other to help diagnose your condition.  ¨ Your skinfold thickness. Your health care provider may gently pinch a fold of your skin and measure it.  How is this treated?  Treatment for this condition  often includes changing your lifestyle. Treatment may include some or all of the following:  · Dietary changes. Work with your health care provider and a dietitian to set a weight-loss goal that is healthy and reasonable for you. Dietary changes may include eating:  ¨ Smaller portions. A portion size is the amount of a particular food that is healthy for you to eat at one time. This varies from person to person.  ¨ Low-calorie or low-fat options.  ¨ More whole grains, fruits, and vegetables.  · Regular physical activity. This may include aerobic activity (cardio) and strength training.  · Medicine to help you lose weight. Your health care provider may prescribe medicine if you are unable to lose 1 pound a week after 6 weeks of eating more healthily and doing more physical activity.  · Surgery. Surgical options may include gastric banding and gastric bypass. Surgery may be done if:  ¨ Other treatments have not helped to improve your condition.  ¨ You have a BMI of 40 or higher.  ¨ You have life-threatening health problems related to obesity.  Follow these instructions at home:     Eating and drinking     · Follow recommendations from your health care provider about what you eat and drink. Your health care provider may advise you to:  ¨ Limit fast foods, sweets, and processed snack foods.  ¨ Choose low-fat options, such as low-fat milk instead of whole milk.  ¨ Eat 5 or more servings of fruits or vegetables every day.  ¨ Eat at home more often. This gives you more control over what you eat.  ¨ Choose healthy foods when you eat out.  ¨ Learn what a healthy portion size is.  ¨ Keep low-fat snacks on hand.  ¨ Avoid sugary drinks, such as soda, fruit juice, iced tea sweetened with sugar, and flavored milk.  ¨ Eat a healthy breakfast.  · Drink enough water to keep your urine clear or pale yellow.  · Do not go without eating for long periods of time (do not fast) or follow a fad diet. Fasting and fad diets can be unhealthy  and even dangerous.  Physical Activity   · Exercise regularly, as told by your health care provider. Ask your health care provider what types of exercise are safe for you and how often you should exercise.  · Warm up and stretch before being active.  · Cool down and stretch after being active.  · Rest between periods of activity.  Lifestyle   · Limit the time that you spend in front of your TV, computer, or video game system.  · Find ways to reward yourself that do not involve food.  · Limit alcohol intake to no more than 1 drink a day for nonpregnant women and 2 drinks a day for men. One drink equals 12 oz of beer, 5 oz of wine, or 1½ oz of hard liquor.  General instructions   · Keep a weight loss journal to keep track of the food you eat and how much you exercise you get.  · Take over-the-counter and prescription medicines only as told by your health care provider.  · Take vitamins and supplements only as told by your health care provider.  · Consider joining a support group. Your health care provider may be able to recommend a support group.  · Keep all follow-up visits as told by your health care provider. This is important.  Contact a health care provider if:  · You are unable to meet your weight loss goal after 6 weeks of dietary and lifestyle changes.  This information is not intended to replace advice given to you by your health care provider. Make sure you discuss any questions you have with your health care provider.  Document Released: 01/25/2006 Document Revised: 05/22/2017 Document Reviewed: 10/05/2016  [employer] Patient Education © 2017 [employer]      IF YOU SMOKE OR USE TOBACCO PLEASE READ THE FOLLOWING:    Why is smoking bad for me?  Smoking increases the risk of heart disease, lung disease, vascular disease, stroke, and cancer.     If you smoke, STOP!    If you would like more information on quitting smoking, please visit the [facility] website:  [email or web address]/Shoutitoutate/healthier-together/smoke   This link will provide additional resources including the QUIT line and the [employer] support groups.     For more information:    Quit Now [facility]  1-800-QUIT-NOW  [email or web address]/en-[email or web address]

## 2019-02-05 PROBLEM — I95.1 ORTHOSTATIC HYPOTENSION: Status: ACTIVE | Noted: 2019-02-05

## 2019-02-05 NOTE — PROGRESS NOTES
Subjective:     Encounter Date: 01/30/2019      Patient ID: Erick Luong is a 63 y.o. male with a history of coronary artery disease, status post previous coronary artery bypass grafting (left internal mammary artery graft to left anterior descending coronary artery; saphenous vein graft to diagonal coronary artery; saphenous vein graft to obtuse marginal coronary artery; saphenous vein graft to right coronary artery), chronic noncardiac chest pain, morbid obesity, type II diabetes mellitus, chronic diastolic dysfunction, hypertension, hyperlipidemia who presents today for follow-up.    Chief Complaint: Recent syncope and low blood pressure    History of Present Illness     This patient presents today for follow-up.  He says that recently, he has been having considerable difficulties with blood pressure.  He says that over the summer months he was feeling good.  More recently, he has had episodes of severely low blood pressure.  When he arises from a seated position, he has been lightheaded and dizzy and has had multiple syncopal spells.  The patient has previously had a diagnosis of chronic noncardiac chest pain but he says that he has not had any significant chest pain lately.  Patient also has some degree of chronic nausea and abdominal pain but the symptoms have been minimal lately as well.  Ultimately, all of his blood pressure medications were held and he was placed on midodrine for blood pressure support.  His symptoms may have improved slightly since then but he still remains somewhat lightheaded and dizzy when standing.  The patient has chronic shortness of breath and dyspnea on exertion and he describes the symptoms is unchanged.  He denies orthopnea, PND, edema.  He has not had any significant palpitations.  He says there have been no other changes in his health lately.  He tells me that his diabetes has been reasonably well-controlled and he has not had any episodes of hypoglycemia his knowledge.   Both he and his wife note that every event that he has had in terms of lightheadedness, dizziness and syncope had been when standing from a seated position.  They tell me that in Dr. Shetty's office, he had orthostatic blood pressures checked and he was orthostatic.  That is when blood pressure medications were held.      Current Outpatient Medications:   •  aspirin 81 MG EC tablet, Take 81 mg by mouth Daily., Disp: , Rfl:   •  insulin regular (humuLIN R) 500 UNIT/ML CONCENTRATED injection, Inject 100 Units under the skin 3 (Three) Times a Day Before Meals. Packaging states 100 units with regular meals; 120 units with large meals or 360 units daily, Disp: , Rfl:   •  linaclotide (LINZESS) 290 MCG capsule capsule, Take 290 mcg by mouth Every Morning Before Breakfast., Disp: , Rfl:   •  ondansetron ODT (ZOFRAN-ODT) 4 MG disintegrating tablet, Take 4 mg by mouth 4 (Four) Times a Day As Needed for Nausea or Vomiting., Disp: , Rfl:   •  oxyCODONE-acetaminophen (PERCOCET)  MG per tablet, Take 1 tablet by mouth 2 (Two) Times a Day With Meals., Disp: , Rfl:   •  pantoprazole (PROTONIX) 40 MG EC tablet, Take 1 tablet by mouth Daily., Disp: 90 tablet, Rfl: 3  •  rosuvastatin (CRESTOR) 40 MG tablet, Take 40 mg by mouth Daily., Disp: , Rfl:   •  allopurinol (ZYLOPRIM) 100 MG tablet, Take 100 mg by mouth Daily., Disp: , Rfl:   •  amitriptyline (ELAVIL) 50 MG tablet, Take 50 mg by mouth Every Night., Disp: , Rfl:   •  escitalopram (LEXAPRO) 10 MG tablet, Take 10 mg by mouth Daily., Disp: , Rfl:   •  isosorbide mononitrate (IMDUR) 30 MG 24 hr tablet, Take 30 mg by mouth Daily., Disp: , Rfl:   •  metOLazone (ZAROXOLYN) 2.5 MG tablet, Take 2.5 mg by mouth Daily., Disp: , Rfl:   •  midodrine (PROAMATINE) 5 MG tablet, Take 5 mg by mouth 3 (Three) Times a Day., Disp: , Rfl: 0  •  nebivolol (BYSTOLIC) 5 MG tablet, Take 5 mg by mouth Daily., Disp: , Rfl:   •  polyethylene glycol (GoLYTELY) 236 g solution, Take as directed by office  "instructions., Disp: 4000 mL, Rfl: 0    Allergies   Allergen Reactions   • Bactrim [Sulfamethoxazole-Trimethoprim] Other (See Comments)     \"RENAL FAILURE\"     Social History     Tobacco Use   • Smoking status: Never Smoker   • Smokeless tobacco: Never Used   • Tobacco comment: smoked in highschool   Substance Use Topics   • Alcohol use: No     Review of Systems   Constitution: Positive for weakness and malaise/fatigue. Negative for chills, fever, night sweats and weight loss.   Cardiovascular: Positive for dyspnea on exertion and syncope. Negative for chest pain, claudication, irregular heartbeat, leg swelling, orthopnea, palpitations and paroxysmal nocturnal dyspnea.   Respiratory: Negative for cough, hemoptysis, shortness of breath and wheezing.    Endocrine: Negative for cold intolerance and heat intolerance.   Hematologic/Lymphatic: Negative for bleeding problem. Does not bruise/bleed easily.   Gastrointestinal: Positive for abdominal pain and nausea. Negative for hematemesis, hematochezia, melena and vomiting.   Genitourinary: Negative for dysuria and hematuria.   Neurological: Positive for dizziness and light-headedness. Negative for loss of balance and numbness.       Procedures: None today       Objective:     Physical Exam   Constitutional: He is oriented to person, place, and time. He appears well-developed and well-nourished. No distress.   HENT:   Head: Normocephalic and atraumatic.   Mouth/Throat: Oropharynx is clear and moist.   Eyes: EOM are normal. Pupils are equal, round, and reactive to light.   Neck: Normal range of motion. Neck supple. No JVD present. No thyromegaly present.   Cardiovascular: Normal rate, regular rhythm, S1 normal, S2 normal, normal heart sounds and intact distal pulses. Exam reveals no gallop and no friction rub.   No murmur heard.  Pulmonary/Chest: Effort normal and breath sounds normal.   Abdominal: Soft. Bowel sounds are normal. He exhibits no distension. There is no " "tenderness.   Musculoskeletal: Normal range of motion. He exhibits no edema.   Neurological: He is alert and oriented to person, place, and time. No cranial nerve deficit.   Skin: Skin is warm and dry. No rash noted. No cyanosis or erythema. Nails show no clubbing.   Psychiatric: He has a normal mood and affect.   Vitals reviewed.    /76 (BP Location: Left arm, Patient Position: Sitting)   Pulse 87   Ht 182.9 cm (72\")   Wt (!) 142 kg (312 lb)   SpO2 98%   BMI 42.31 kg/m²     Data/Lab Review:     Echocardiogram on 1/17/18:  · The study is technically suboptimal for diagnosis. The quality of the study is limited due to poor acoustic windows related to patient body habitus.  · Left ventricular systolic function is normal. Calculated EF = 56.9%.  · Left ventricular wall thickness is consistent with moderate concentric hypertrophy.  · Cardiac valves are not well visualized. However, no hemodynamically significant valvular abnormalities are appreciated by Doppler assessment.    The patient did have a stress echocardiogram in January 2018 that was low risk for ischemia at that time.      Assessment:          Diagnosis Plan   1. Syncope and collapse  Holter Monitor - 48 Hour   2. Orthostatic hypotension     3. Coronary artery disease involving native coronary artery of native heart without angina pectoris     4. Essential hypertension            Plan:       1.  Syncope and collapse: By history, the patient's diagnosis is that of orthostatic hypotension, ultimately leading to syncope.  There is no significant suspicion that anything other than orthostasis is the cause of his symptoms.  History alone with support this diagnosis.  I do not see any need for tilt table testing or any echocardiographic assessment with there being any indication for cardiac stress testing.  I think that it is at least reasonable to place this patient in a 48 hour Holter monitor to reevaluate his cardiac rhythm.  He says that he remains " lightheaded and dizzy when standing from a seated position, therefore with persistent symptoms, we would be able to correlate if his symptoms are indeed related to any underlying cardiac rhythm disturbance.  I am not convinced that we will find anything on this monitor but I think this is simply reasonable at this time to further evaluate.  He will continue to hold his blood pressure medications and he remains on midodrine as well for blood pressure support.  I am unclear what the cause of his rapid lowering and blood pressure is, however I cannot determine that there would be any obvious cardiac basis for this.  The patient tells me that he had a host of labs ordered by his primary care physician and his wife seems to think that he had cortisone levels and other testing which may also help explain some of his symptoms.  So far, they report that the workup has been unrevealing.    2.  Orthostatic hypotension: By history alone, this patient has orthostatic hypotension that has lead to syncope.  He is currently holding all of his antihypertensives.  I have encouraged adequate hydration, arising slowly from a seated position to assure that he is not going to be lightheaded or dizzy.  In addition, I have recommended the use of compression socks which may be of some small benefit in the setting of chronic orthostatic hypotension.  However, no further cardiac workup at this time as the patient's history alone is consistent with that of orthostatic hypotension and the patient himself notes that this has been documented previously.    3.  Coronary artery disease: Currently, the patient will be holding blood pressure medication, including beta blocker.  He does remain on aspirin as well as Crestor at this time.  He is not expressing any ischemic symptoms.  His symptoms of lightheadedness, dizziness, orthostasis would not lead me to feel the need to perform any further ischemic evaluation at this time as the symptoms are  simply not ischemic symptoms.    4.  Essential hypertension: As stated, the patient's medications are currently being held which is reasonable at this time.    Patient's Body mass index is 42.31 kg/m². BMI is above normal parameters. Recommendations include: exercise counseling and nutrition counseling.    Follow-up: pending Holter results

## 2019-02-07 ENCOUNTER — APPOINTMENT (OUTPATIENT)
Dept: CT IMAGING | Facility: HOSPITAL | Age: 64
End: 2019-02-07

## 2019-02-07 ENCOUNTER — HOSPITAL ENCOUNTER (OUTPATIENT)
Facility: HOSPITAL | Age: 64
Setting detail: OBSERVATION
Discharge: HOME | End: 2019-02-13
Attending: SPECIALIST | Admitting: SPECIALIST

## 2019-02-07 ENCOUNTER — APPOINTMENT (OUTPATIENT)
Dept: GENERAL RADIOLOGY | Facility: HOSPITAL | Age: 64
End: 2019-02-07

## 2019-02-07 DIAGNOSIS — R93.5 ABNORMAL CT OF THE ABDOMEN: ICD-10-CM

## 2019-02-07 DIAGNOSIS — E87.20 LACTIC ACIDOSIS: ICD-10-CM

## 2019-02-07 DIAGNOSIS — R10.30 LOWER ABDOMINAL PAIN: ICD-10-CM

## 2019-02-07 DIAGNOSIS — K59.00 CONSTIPATION, UNSPECIFIED CONSTIPATION TYPE: Primary | ICD-10-CM

## 2019-02-07 PROBLEM — E11.65 TYPE 2 DIABETES MELLITUS WITH HYPERGLYCEMIA, WITH LONG-TERM CURRENT USE OF INSULIN: Status: ACTIVE | Noted: 2017-05-03

## 2019-02-07 PROBLEM — R10.9 ABDOMINAL PAIN: Status: ACTIVE | Noted: 2019-02-07

## 2019-02-07 LAB
ALBUMIN SERPL-MCNC: 4.5 G/DL (ref 3.5–5)
ALBUMIN/GLOB SERPL: 1.7 G/DL (ref 1.1–2.5)
ALP SERPL-CCNC: 62 U/L (ref 24–120)
ALT SERPL W P-5'-P-CCNC: 20 U/L (ref 0–54)
ANION GAP SERPL CALCULATED.3IONS-SCNC: 14 MMOL/L (ref 4–13)
AST SERPL-CCNC: 34 U/L (ref 7–45)
BASOPHILS # BLD AUTO: 0.05 10*3/MM3 (ref 0–0.2)
BASOPHILS NFR BLD AUTO: 0.6 % (ref 0–2)
BILIRUB SERPL-MCNC: 0.9 MG/DL (ref 0.1–1)
BILIRUB UR QL STRIP: NEGATIVE
BUN BLD-MCNC: 13 MG/DL (ref 5–21)
BUN/CREAT SERPL: 9.3 (ref 7–25)
CALCIUM SPEC-SCNC: 9.2 MG/DL (ref 8.4–10.4)
CHLORIDE SERPL-SCNC: 97 MMOL/L (ref 98–110)
CLARITY UR: CLEAR
CO2 SERPL-SCNC: 24 MMOL/L (ref 24–31)
COLOR UR: YELLOW
CREAT BLD-MCNC: 1.4 MG/DL (ref 0.5–1.4)
D-LACTATE SERPL-SCNC: 1.9 MMOL/L (ref 0.5–2)
D-LACTATE SERPL-SCNC: 3 MMOL/L (ref 0.5–2)
DEPRECATED RDW RBC AUTO: 46.5 FL (ref 40–54)
EOSINOPHIL # BLD AUTO: 0.33 10*3/MM3 (ref 0–0.7)
EOSINOPHIL NFR BLD AUTO: 3.8 % (ref 0–4)
ERYTHROCYTE [DISTWIDTH] IN BLOOD BY AUTOMATED COUNT: 14.4 % (ref 12–15)
GFR SERPL CREATININE-BSD FRML MDRD: 51 ML/MIN/1.73
GLOBULIN UR ELPH-MCNC: 2.6 GM/DL
GLUCOSE BLD-MCNC: 204 MG/DL (ref 70–100)
GLUCOSE UR STRIP-MCNC: ABNORMAL MG/DL
HCT VFR BLD AUTO: 38.7 % (ref 40–52)
HGB BLD-MCNC: 13.1 G/DL (ref 14–18)
HGB UR QL STRIP.AUTO: NEGATIVE
HOLD SPECIMEN: NORMAL
IMM GRANULOCYTES # BLD AUTO: 0.03 10*3/MM3 (ref 0–0.03)
IMM GRANULOCYTES NFR BLD AUTO: 0.3 % (ref 0–5)
KETONES UR QL STRIP: NEGATIVE
LEUKOCYTE ESTERASE UR QL STRIP.AUTO: NEGATIVE
LIPASE SERPL-CCNC: 16 U/L (ref 23–203)
LYMPHOCYTES # BLD AUTO: 1.14 10*3/MM3 (ref 0.72–4.86)
LYMPHOCYTES NFR BLD AUTO: 13.3 % (ref 15–45)
MCH RBC QN AUTO: 30.3 PG (ref 28–32)
MCHC RBC AUTO-ENTMCNC: 33.9 G/DL (ref 33–36)
MCV RBC AUTO: 89.4 FL (ref 82–95)
MONOCYTES # BLD AUTO: 0.71 10*3/MM3 (ref 0.19–1.3)
MONOCYTES NFR BLD AUTO: 8.3 % (ref 4–12)
NEUTROPHILS # BLD AUTO: 6.33 10*3/MM3 (ref 1.87–8.4)
NEUTROPHILS NFR BLD AUTO: 73.7 % (ref 39–78)
NITRITE UR QL STRIP: NEGATIVE
NRBC BLD AUTO-RTO: 0 /100 WBC (ref 0–0)
NT-PROBNP SERPL-MCNC: 574 PG/ML (ref 0–900)
PH UR STRIP.AUTO: <=5 [PH] (ref 5–8)
PLATELET # BLD AUTO: 260 10*3/MM3 (ref 130–400)
PMV BLD AUTO: 10.6 FL (ref 6–12)
POTASSIUM BLD-SCNC: 4 MMOL/L (ref 3.5–5.3)
PROT SERPL-MCNC: 7.1 G/DL (ref 6.3–8.7)
PROT UR QL STRIP: NEGATIVE
RBC # BLD AUTO: 4.33 10*6/MM3 (ref 4.8–5.9)
SODIUM BLD-SCNC: 135 MMOL/L (ref 135–145)
SP GR UR STRIP: 1.01 (ref 1–1.03)
TROPONIN I SERPL-MCNC: <0.012 NG/ML (ref 0–0.03)
TROPONIN I SERPL-MCNC: <0.012 NG/ML (ref 0–0.03)
UROBILINOGEN UR QL STRIP: ABNORMAL
WBC NRBC COR # BLD: 8.59 10*3/MM3 (ref 4.8–10.8)
WHOLE BLOOD HOLD SPECIMEN: NORMAL
WHOLE BLOOD HOLD SPECIMEN: NORMAL

## 2019-02-07 PROCEDURE — 93005 ELECTROCARDIOGRAM TRACING: CPT | Performed by: PHYSICIAN ASSISTANT

## 2019-02-07 PROCEDURE — 87040 BLOOD CULTURE FOR BACTERIA: CPT | Performed by: PHYSICIAN ASSISTANT

## 2019-02-07 PROCEDURE — G0378 HOSPITAL OBSERVATION PER HR: HCPCS

## 2019-02-07 PROCEDURE — 74176 CT ABD & PELVIS W/O CONTRAST: CPT

## 2019-02-07 PROCEDURE — 25010000002 ONDANSETRON PER 1 MG: Performed by: EMERGENCY MEDICINE

## 2019-02-07 PROCEDURE — 70450 CT HEAD/BRAIN W/O DYE: CPT

## 2019-02-07 PROCEDURE — 96375 TX/PRO/DX INJ NEW DRUG ADDON: CPT

## 2019-02-07 PROCEDURE — 93005 ELECTROCARDIOGRAM TRACING: CPT | Performed by: SPECIALIST

## 2019-02-07 PROCEDURE — 84484 ASSAY OF TROPONIN QUANT: CPT | Performed by: SPECIALIST

## 2019-02-07 PROCEDURE — 83605 ASSAY OF LACTIC ACID: CPT | Performed by: SPECIALIST

## 2019-02-07 PROCEDURE — 96376 TX/PRO/DX INJ SAME DRUG ADON: CPT

## 2019-02-07 PROCEDURE — 25010000002 MEROPENEM PER 100 MG: Performed by: SPECIALIST

## 2019-02-07 PROCEDURE — 71045 X-RAY EXAM CHEST 1 VIEW: CPT

## 2019-02-07 PROCEDURE — 72125 CT NECK SPINE W/O DYE: CPT

## 2019-02-07 PROCEDURE — 83880 ASSAY OF NATRIURETIC PEPTIDE: CPT | Performed by: PHYSICIAN ASSISTANT

## 2019-02-07 PROCEDURE — 93010 ELECTROCARDIOGRAM REPORT: CPT | Performed by: INTERNAL MEDICINE

## 2019-02-07 PROCEDURE — 83605 ASSAY OF LACTIC ACID: CPT | Performed by: PHYSICIAN ASSISTANT

## 2019-02-07 PROCEDURE — 99284 EMERGENCY DEPT VISIT MOD MDM: CPT

## 2019-02-07 PROCEDURE — 94799 UNLISTED PULMONARY SVC/PX: CPT

## 2019-02-07 PROCEDURE — 96365 THER/PROPH/DIAG IV INF INIT: CPT

## 2019-02-07 PROCEDURE — 85025 COMPLETE CBC W/AUTO DIFF WBC: CPT | Performed by: PHYSICIAN ASSISTANT

## 2019-02-07 PROCEDURE — 84484 ASSAY OF TROPONIN QUANT: CPT | Performed by: PHYSICIAN ASSISTANT

## 2019-02-07 PROCEDURE — 25010000002 ONDANSETRON PER 1 MG: Performed by: SPECIALIST

## 2019-02-07 PROCEDURE — 83690 ASSAY OF LIPASE: CPT | Performed by: PHYSICIAN ASSISTANT

## 2019-02-07 PROCEDURE — 25010000002 MORPHINE PER 10 MG: Performed by: EMERGENCY MEDICINE

## 2019-02-07 PROCEDURE — 80053 COMPREHEN METABOLIC PANEL: CPT | Performed by: PHYSICIAN ASSISTANT

## 2019-02-07 PROCEDURE — 81003 URINALYSIS AUTO W/O SCOPE: CPT | Performed by: PHYSICIAN ASSISTANT

## 2019-02-07 RX ORDER — DEXTROSE MONOHYDRATE 25 G/50ML
25 INJECTION, SOLUTION INTRAVENOUS
Status: DISCONTINUED | OUTPATIENT
Start: 2019-02-07 | End: 2019-02-13 | Stop reason: HOSPADM

## 2019-02-07 RX ORDER — SODIUM CHLORIDE 0.9 % (FLUSH) 0.9 %
10 SYRINGE (ML) INJECTION AS NEEDED
Status: DISCONTINUED | OUTPATIENT
Start: 2019-02-07 | End: 2019-02-13 | Stop reason: HOSPADM

## 2019-02-07 RX ORDER — NICOTINE POLACRILEX 4 MG
15 LOZENGE BUCCAL
Status: DISCONTINUED | OUTPATIENT
Start: 2019-02-07 | End: 2019-02-13 | Stop reason: HOSPADM

## 2019-02-07 RX ORDER — METOLAZONE 2.5 MG/1
2.5 TABLET ORAL DAILY
Status: DISCONTINUED | OUTPATIENT
Start: 2019-02-08 | End: 2019-02-08

## 2019-02-07 RX ORDER — ONDANSETRON 2 MG/ML
4 INJECTION INTRAMUSCULAR; INTRAVENOUS ONCE
Status: COMPLETED | OUTPATIENT
Start: 2019-02-07 | End: 2019-02-07

## 2019-02-07 RX ORDER — MORPHINE SULFATE 2 MG/ML
2 INJECTION, SOLUTION INTRAMUSCULAR; INTRAVENOUS
Status: DISCONTINUED | OUTPATIENT
Start: 2019-02-07 | End: 2019-02-08

## 2019-02-07 RX ORDER — ROSUVASTATIN CALCIUM 20 MG/1
40 TABLET, COATED ORAL DAILY
Status: DISCONTINUED | OUTPATIENT
Start: 2019-02-08 | End: 2019-02-13 | Stop reason: HOSPADM

## 2019-02-07 RX ORDER — ONDANSETRON HCL IN 0.9 % NACL 8 MG/50 ML
8 INTRAVENOUS SOLUTION, PIGGYBACK (ML) INTRAVENOUS EVERY 6 HOURS PRN
Status: DISCONTINUED | OUTPATIENT
Start: 2019-02-07 | End: 2019-02-13 | Stop reason: HOSPADM

## 2019-02-07 RX ORDER — ESCITALOPRAM OXALATE 10 MG/1
10 TABLET ORAL DAILY
Status: DISCONTINUED | OUTPATIENT
Start: 2019-02-08 | End: 2019-02-08

## 2019-02-07 RX ORDER — AMITRIPTYLINE HYDROCHLORIDE 25 MG/1
50 TABLET, FILM COATED ORAL NIGHTLY
Status: DISCONTINUED | OUTPATIENT
Start: 2019-02-07 | End: 2019-02-08

## 2019-02-07 RX ORDER — ISOSORBIDE MONONITRATE 30 MG/1
30 TABLET, EXTENDED RELEASE ORAL DAILY
Status: DISCONTINUED | OUTPATIENT
Start: 2019-02-08 | End: 2019-02-13 | Stop reason: HOSPADM

## 2019-02-07 RX ORDER — NEBIVOLOL 5 MG/1
5 TABLET ORAL DAILY
Status: DISCONTINUED | OUTPATIENT
Start: 2019-02-08 | End: 2019-02-08

## 2019-02-07 RX ORDER — MAGNESIUM CARB/ALUMINUM HYDROX 105-160MG
296 TABLET,CHEWABLE ORAL ONCE
Status: COMPLETED | OUTPATIENT
Start: 2019-02-07 | End: 2019-02-07

## 2019-02-07 RX ORDER — SODIUM CHLORIDE 0.9 % (FLUSH) 0.9 %
3 SYRINGE (ML) INJECTION EVERY 12 HOURS SCHEDULED
Status: DISCONTINUED | OUTPATIENT
Start: 2019-02-07 | End: 2019-02-13 | Stop reason: HOSPADM

## 2019-02-07 RX ORDER — MIDODRINE HYDROCHLORIDE 10 MG/1
10 TABLET ORAL
Status: DISCONTINUED | OUTPATIENT
Start: 2019-02-08 | End: 2019-02-13 | Stop reason: HOSPADM

## 2019-02-07 RX ORDER — MEPERIDINE HYDROCHLORIDE 25 MG/ML
25 INJECTION INTRAMUSCULAR; INTRAVENOUS; SUBCUTANEOUS ONCE
Status: COMPLETED | OUTPATIENT
Start: 2019-02-07 | End: 2019-02-07

## 2019-02-07 RX ORDER — ONDANSETRON 8 MG/1
8 TABLET, ORALLY DISINTEGRATING ORAL EVERY 6 HOURS PRN
Status: DISCONTINUED | OUTPATIENT
Start: 2019-02-07 | End: 2019-02-13 | Stop reason: HOSPADM

## 2019-02-07 RX ORDER — SODIUM CHLORIDE 0.9 % (FLUSH) 0.9 %
1-10 SYRINGE (ML) INJECTION AS NEEDED
Status: DISCONTINUED | OUTPATIENT
Start: 2019-02-07 | End: 2019-02-13 | Stop reason: HOSPADM

## 2019-02-07 RX ORDER — ALLOPURINOL 100 MG/1
100 TABLET ORAL DAILY
Status: DISCONTINUED | OUTPATIENT
Start: 2019-02-08 | End: 2019-02-09

## 2019-02-07 RX ORDER — BUMETANIDE 2 MG/1
3 TABLET ORAL DAILY
COMMUNITY
End: 2019-03-06 | Stop reason: HOSPADM

## 2019-02-07 RX ORDER — PANTOPRAZOLE SODIUM 40 MG/1
40 TABLET, DELAYED RELEASE ORAL DAILY
Status: DISCONTINUED | OUTPATIENT
Start: 2019-02-08 | End: 2019-02-13 | Stop reason: HOSPADM

## 2019-02-07 RX ADMIN — MEROPENEM 1 G: 1 INJECTION, POWDER, FOR SOLUTION INTRAVENOUS at 21:54

## 2019-02-07 RX ADMIN — MINERAL OIL 360 ML: 999 LIQUID ORAL at 23:27

## 2019-02-07 RX ADMIN — ONDANSETRON HYDROCHLORIDE 4 MG: 2 INJECTION, SOLUTION INTRAMUSCULAR; INTRAVENOUS at 17:56

## 2019-02-07 RX ADMIN — MORPHINE SULFATE 4 MG: 4 INJECTION INTRAVENOUS at 17:54

## 2019-02-07 RX ADMIN — ONDANSETRON HYDROCHLORIDE 4 MG: 2 INJECTION, SOLUTION INTRAMUSCULAR; INTRAVENOUS at 20:05

## 2019-02-07 RX ADMIN — ONDANSETRON 8 MG: 2 INJECTION INTRAMUSCULAR; INTRAVENOUS at 23:27

## 2019-02-07 RX ADMIN — MEPERIDINE HYDROCHLORIDE 25 MG: 25 INJECTION INTRAMUSCULAR; INTRAVENOUS; SUBCUTANEOUS at 20:05

## 2019-02-07 RX ADMIN — SODIUM CHLORIDE, POTASSIUM CHLORIDE, SODIUM LACTATE AND CALCIUM CHLORIDE 1000 ML: 600; 310; 30; 20 INJECTION, SOLUTION INTRAVENOUS at 21:47

## 2019-02-07 RX ADMIN — Medication 296 ML: at 21:54

## 2019-02-08 ENCOUNTER — APPOINTMENT (OUTPATIENT)
Dept: CT IMAGING | Facility: HOSPITAL | Age: 64
End: 2019-02-08

## 2019-02-08 LAB
ALBUMIN SERPL-MCNC: 3.9 G/DL (ref 3.5–5)
ALBUMIN/GLOB SERPL: 1.6 G/DL (ref 1.1–2.5)
ALP SERPL-CCNC: 59 U/L (ref 24–120)
ALT SERPL W P-5'-P-CCNC: 21 U/L (ref 0–54)
AMYLASE SERPL-CCNC: 53 U/L (ref 30–110)
ANION GAP SERPL CALCULATED.3IONS-SCNC: 10 MMOL/L (ref 4–13)
AST SERPL-CCNC: 29 U/L (ref 7–45)
BASOPHILS # BLD MANUAL: 0.08 10*3/MM3 (ref 0–0.2)
BASOPHILS NFR BLD AUTO: 1 % (ref 0–2)
BILIRUB SERPL-MCNC: 0.9 MG/DL (ref 0.1–1)
BUN BLD-MCNC: 16 MG/DL (ref 5–21)
BUN/CREAT SERPL: 11.3 (ref 7–25)
CALCIUM SPEC-SCNC: 8.7 MG/DL (ref 8.4–10.4)
CHLORIDE SERPL-SCNC: 97 MMOL/L (ref 98–110)
CO2 SERPL-SCNC: 29 MMOL/L (ref 24–31)
CREAT BLD-MCNC: 1.41 MG/DL (ref 0.5–1.4)
D-LACTATE SERPL-SCNC: 1.3 MMOL/L (ref 0.5–2)
DEPRECATED RDW RBC AUTO: 46.4 FL (ref 40–54)
EOSINOPHIL # BLD MANUAL: 0.08 10*3/MM3 (ref 0–0.7)
EOSINOPHIL NFR BLD MANUAL: 1 % (ref 0–4)
ERYTHROCYTE [DISTWIDTH] IN BLOOD BY AUTOMATED COUNT: 14.5 % (ref 12–15)
GFR SERPL CREATININE-BSD FRML MDRD: 51 ML/MIN/1.73
GLOBULIN UR ELPH-MCNC: 2.5 GM/DL
GLUCOSE BLD-MCNC: 240 MG/DL (ref 70–100)
GLUCOSE BLDC GLUCOMTR-MCNC: 180 MG/DL (ref 70–130)
GLUCOSE BLDC GLUCOMTR-MCNC: 246 MG/DL (ref 70–130)
GLUCOSE BLDC GLUCOMTR-MCNC: 253 MG/DL (ref 70–130)
GLUCOSE BLDC GLUCOMTR-MCNC: 262 MG/DL (ref 70–130)
HBA1C MFR BLD: 8.5 %
HCT VFR BLD AUTO: 36.4 % (ref 40–52)
HGB BLD-MCNC: 12.3 G/DL (ref 14–18)
INR PPP: 0.96 (ref 0.91–1.09)
LIPASE SERPL-CCNC: 25 U/L (ref 23–203)
LYMPHOCYTES # BLD MANUAL: 1.03 10*3/MM3 (ref 0.72–4.86)
LYMPHOCYTES NFR BLD MANUAL: 12.2 % (ref 15–45)
LYMPHOCYTES NFR BLD MANUAL: 4.1 % (ref 4–12)
MAGNESIUM SERPL-MCNC: 2.2 MG/DL (ref 1.4–2.2)
MCH RBC QN AUTO: 30.1 PG (ref 28–32)
MCHC RBC AUTO-ENTMCNC: 33.8 G/DL (ref 33–36)
MCV RBC AUTO: 89.2 FL (ref 82–95)
MONOCYTES # BLD AUTO: 0.34 10*3/MM3 (ref 0.19–1.3)
NEUTROPHILS # BLD AUTO: 6.86 10*3/MM3 (ref 1.87–8.4)
NEUTROPHILS NFR BLD MANUAL: 81.6 % (ref 39–78)
PLAT MORPH BLD: NORMAL
PLATELET # BLD AUTO: 246 10*3/MM3 (ref 130–400)
PMV BLD AUTO: 11.2 FL (ref 6–12)
POTASSIUM BLD-SCNC: 4.2 MMOL/L (ref 3.5–5.3)
PROT SERPL-MCNC: 6.4 G/DL (ref 6.3–8.7)
PROTHROMBIN TIME: 13.1 SECONDS (ref 11.9–14.6)
RBC # BLD AUTO: 4.08 10*6/MM3 (ref 4.8–5.9)
RBC MORPH BLD: NORMAL
SODIUM BLD-SCNC: 136 MMOL/L (ref 135–145)
WBC MORPH BLD: NORMAL
WBC NRBC COR # BLD: 8.41 10*3/MM3 (ref 4.8–10.8)

## 2019-02-08 PROCEDURE — 85007 BL SMEAR W/DIFF WBC COUNT: CPT | Performed by: SPECIALIST

## 2019-02-08 PROCEDURE — 96372 THER/PROPH/DIAG INJ SC/IM: CPT

## 2019-02-08 PROCEDURE — 0 IOHEXOL 300 MG/ML SOLUTION: Performed by: SPECIALIST

## 2019-02-08 PROCEDURE — 25010000002 IOPAMIDOL 61 % SOLUTION: Performed by: SPECIALIST

## 2019-02-08 PROCEDURE — 83605 ASSAY OF LACTIC ACID: CPT | Performed by: NURSE PRACTITIONER

## 2019-02-08 PROCEDURE — 36415 COLL VENOUS BLD VENIPUNCTURE: CPT | Performed by: SPECIALIST

## 2019-02-08 PROCEDURE — 83690 ASSAY OF LIPASE: CPT | Performed by: SPECIALIST

## 2019-02-08 PROCEDURE — 96376 TX/PRO/DX INJ SAME DRUG ADON: CPT

## 2019-02-08 PROCEDURE — 74177 CT ABD & PELVIS W/CONTRAST: CPT

## 2019-02-08 PROCEDURE — G0378 HOSPITAL OBSERVATION PER HR: HCPCS

## 2019-02-08 PROCEDURE — 82150 ASSAY OF AMYLASE: CPT | Performed by: SPECIALIST

## 2019-02-08 PROCEDURE — 94799 UNLISTED PULMONARY SVC/PX: CPT

## 2019-02-08 PROCEDURE — 80053 COMPREHEN METABOLIC PANEL: CPT | Performed by: SPECIALIST

## 2019-02-08 PROCEDURE — 96361 HYDRATE IV INFUSION ADD-ON: CPT

## 2019-02-08 PROCEDURE — 85610 PROTHROMBIN TIME: CPT | Performed by: SPECIALIST

## 2019-02-08 PROCEDURE — 63710000001 INSULIN LISPRO (HUMAN) PER 5 UNITS: Performed by: NURSE PRACTITIONER

## 2019-02-08 PROCEDURE — 25010000002 ENOXAPARIN PER 10 MG: Performed by: SPECIALIST

## 2019-02-08 PROCEDURE — 99214 OFFICE O/P EST MOD 30 MIN: CPT | Performed by: INTERNAL MEDICINE

## 2019-02-08 PROCEDURE — 25010000002 MORPHINE PER 10 MG: Performed by: SPECIALIST

## 2019-02-08 PROCEDURE — 85027 COMPLETE CBC AUTOMATED: CPT | Performed by: SPECIALIST

## 2019-02-08 PROCEDURE — 82962 GLUCOSE BLOOD TEST: CPT

## 2019-02-08 PROCEDURE — 25010000002 MEROPENEM PER 100 MG: Performed by: SPECIALIST

## 2019-02-08 PROCEDURE — 96366 THER/PROPH/DIAG IV INF ADDON: CPT

## 2019-02-08 PROCEDURE — 83036 HEMOGLOBIN GLYCOSYLATED A1C: CPT | Performed by: SPECIALIST

## 2019-02-08 PROCEDURE — 83735 ASSAY OF MAGNESIUM: CPT | Performed by: SPECIALIST

## 2019-02-08 RX ORDER — MORPHINE SULFATE 4 MG/ML
4 INJECTION, SOLUTION INTRAMUSCULAR; INTRAVENOUS
Status: DISCONTINUED | OUTPATIENT
Start: 2019-02-07 | End: 2019-02-12

## 2019-02-08 RX ORDER — COLCHICINE 0.6 MG/1
0.6 TABLET ORAL 2 TIMES DAILY PRN
COMMUNITY
End: 2019-07-22

## 2019-02-08 RX ORDER — LACTULOSE 20 G/30ML
20 SOLUTION ORAL 2 TIMES DAILY
Status: DISCONTINUED | OUTPATIENT
Start: 2019-02-08 | End: 2019-02-08

## 2019-02-08 RX ORDER — POLYETHYLENE GLYCOL 3350 17 G/17G
17 POWDER, FOR SOLUTION ORAL DAILY
Status: DISCONTINUED | OUTPATIENT
Start: 2019-02-08 | End: 2019-02-13 | Stop reason: HOSPADM

## 2019-02-08 RX ORDER — DULOXETIN HYDROCHLORIDE 30 MG/1
60 CAPSULE, DELAYED RELEASE ORAL DAILY
Status: DISCONTINUED | OUTPATIENT
Start: 2019-02-09 | End: 2019-02-13 | Stop reason: HOSPADM

## 2019-02-08 RX ADMIN — PANTOPRAZOLE SODIUM 40 MG: 40 TABLET, DELAYED RELEASE ORAL at 08:57

## 2019-02-08 RX ADMIN — MIDODRINE HYDROCHLORIDE 10 MG: 10 TABLET ORAL at 08:57

## 2019-02-08 RX ADMIN — MORPHINE SULFATE 4 MG: 4 INJECTION, SOLUTION INTRAMUSCULAR; INTRAVENOUS at 10:31

## 2019-02-08 RX ADMIN — SODIUM CHLORIDE, PRESERVATIVE FREE 3 ML: 5 INJECTION INTRAVENOUS at 10:23

## 2019-02-08 RX ADMIN — MORPHINE SULFATE 4 MG: 4 INJECTION, SOLUTION INTRAMUSCULAR; INTRAVENOUS at 02:35

## 2019-02-08 RX ADMIN — ONDANSETRON 8 MG: 8 TABLET, ORALLY DISINTEGRATING ORAL at 04:49

## 2019-02-08 RX ADMIN — ENOXAPARIN SODIUM 30 MG: 30 INJECTION SUBCUTANEOUS at 22:08

## 2019-02-08 RX ADMIN — INSULIN LISPRO 12 UNITS: 100 INJECTION, SOLUTION INTRAVENOUS; SUBCUTANEOUS at 18:06

## 2019-02-08 RX ADMIN — IOPAMIDOL 100 ML: 612 INJECTION, SOLUTION INTRAVENOUS at 14:15

## 2019-02-08 RX ADMIN — INSULIN LISPRO 4 UNITS: 100 INJECTION, SOLUTION INTRAVENOUS; SUBCUTANEOUS at 22:15

## 2019-02-08 RX ADMIN — ISOSORBIDE MONONITRATE 30 MG: 30 TABLET, EXTENDED RELEASE ORAL at 09:06

## 2019-02-08 RX ADMIN — POLYETHYLENE GLYCOL 3350 17 G: 17 POWDER, FOR SOLUTION ORAL at 15:03

## 2019-02-08 RX ADMIN — MORPHINE SULFATE 4 MG: 4 INJECTION, SOLUTION INTRAMUSCULAR; INTRAVENOUS at 00:15

## 2019-02-08 RX ADMIN — MINERAL OIL 360 ML: 999 LIQUID ORAL at 08:53

## 2019-02-08 RX ADMIN — ENOXAPARIN SODIUM 30 MG: 30 INJECTION SUBCUTANEOUS at 08:53

## 2019-02-08 RX ADMIN — ENOXAPARIN SODIUM 30 MG: 30 INJECTION SUBCUTANEOUS at 00:15

## 2019-02-08 RX ADMIN — INSULIN LISPRO 12 UNITS: 100 INJECTION, SOLUTION INTRAVENOUS; SUBCUTANEOUS at 08:54

## 2019-02-08 RX ADMIN — ONDANSETRON 8 MG: 8 TABLET, ORALLY DISINTEGRATING ORAL at 10:31

## 2019-02-08 RX ADMIN — SODIUM CHLORIDE, PRESERVATIVE FREE 3 ML: 5 INJECTION INTRAVENOUS at 20:35

## 2019-02-08 RX ADMIN — LACTULOSE 20 G: 20 SOLUTION ORAL at 08:53

## 2019-02-08 RX ADMIN — SODIUM CHLORIDE, POTASSIUM CHLORIDE, SODIUM LACTATE AND CALCIUM CHLORIDE 1000 ML: 600; 310; 30; 20 INJECTION, SOLUTION INTRAVENOUS at 10:23

## 2019-02-08 RX ADMIN — IOHEXOL 25 ML: 300 INJECTION, SOLUTION INTRAVENOUS at 12:45

## 2019-02-08 RX ADMIN — ROSUVASTATIN CALCIUM 40 MG: 20 TABLET, FILM COATED ORAL at 08:58

## 2019-02-08 RX ADMIN — MEROPENEM 2 G: 1 INJECTION, POWDER, FOR SOLUTION INTRAVENOUS at 04:41

## 2019-02-08 RX ADMIN — ALLOPURINOL 100 MG: 100 TABLET ORAL at 08:58

## 2019-02-08 RX ADMIN — MORPHINE SULFATE 4 MG: 4 INJECTION, SOLUTION INTRAMUSCULAR; INTRAVENOUS at 18:29

## 2019-02-08 RX ADMIN — ESCITALOPRAM 10 MG: 10 TABLET, FILM COATED ORAL at 08:58

## 2019-02-08 RX ADMIN — MEROPENEM 2 G: 1 INJECTION, POWDER, FOR SOLUTION INTRAVENOUS at 20:34

## 2019-02-08 RX ADMIN — MIDODRINE HYDROCHLORIDE 10 MG: 10 TABLET ORAL at 11:43

## 2019-02-08 RX ADMIN — MEROPENEM 2 G: 1 INJECTION, POWDER, FOR SOLUTION INTRAVENOUS at 11:41

## 2019-02-08 RX ADMIN — INSULIN LISPRO 8 UNITS: 100 INJECTION, SOLUTION INTRAVENOUS; SUBCUTANEOUS at 12:44

## 2019-02-08 RX ADMIN — MIDODRINE HYDROCHLORIDE 10 MG: 10 TABLET ORAL at 18:06

## 2019-02-08 RX ADMIN — IOHEXOL 50 ML: 300 INJECTION, SOLUTION INTRAVENOUS at 06:40

## 2019-02-08 NOTE — PROGRESS NOTES
Hopefully the radiologic dye will form and osmotic cathartic to this stool in the left colon.Just took a look at the CT scan not read yet but it seems that there is some colitis in the first half of the colon from the mid transverse colon proximally this was not present yesterday, the air in the portal system is no longer present but now there appears to be some colitis in the transverse colon.  Still a large amount of colon at the splenic flexure down.  This is not been read to this point.  Hopefully this CT scan dye will form and be a cathartic to the left-sided stool

## 2019-02-08 NOTE — CONSULTS
INFECTIOUS DISEASES CONSULT NOTE    Patient:  Erick Luong 63 y.o. male  ROOM # 353/1  YOB: 1955  MRN: [id number]  [facility]:  [id number]  Admit date: 2/7/2019   Admitting Physician: Ken Perry MD  Primary Care Physician: Del Shetty MD  REFERRING PROVIDER: Ken Perry MD    Reason for Consultation: Evaluation of possible mesenteric venous air.    History of Present Illness/Chief Complaint: 63-year-old man.  History is obtained from patient, his wife, and chart review.  They indicate about 4-5 weeks ago he had developed some severe constipation.  Indicates he has had a tendency toward constipation in the past.  Typically if he takes some lactulose he will have relief.  This time he did not respond to lactulose.  He saw his primary physician Dr. Shetty.  He was referred to the emergency room.  He was treated and released.  In conjunction with the development of constipation he indicates development of orthostatic symptoms.  Unless he gets up real slow he will notice weakness and a sense he might pass out.  Indicates at times he has passed out completely.  He has had additional outpatient evaluation with Dr. Shetty.  He has seen Dr. Garay.  He was seen by an advanced practice RN with Dr. Hannah of GI.  He has seen Dr. Lomas as well.  He was diagnosed with autonomic dysfunction.  Indicates he was started on midodrin with some improvement but not complete resolution of symptoms.  In the past he used to have hypertension which would require treatment.  He lost weight initially.  Wife indicated he has lost 40 pounds.  Weight seems to have stabilized currently.  They indicate he has had some intermittent sweats.  They also indicate he has had some fairly wide fluctuations in his blood sugars.  Yesterday he developed significant mid abdominal discomfort.  Discomfort was severe enough that he presented to the emergency room.  Wife was concerned that he had had some bruising/discoloration mid  "lower abdominal area.  There does appear to be some superficial bruising, but there did not appear to be any deep bruise or hematoma.  He has had some pain medication.  Abdominal discomfort showing some improvement.  He had a CT yesterday that suggested some air in the portal venous system and also in the liver.  Follow-up CT scan with contrast has been ordered from today.    Current Scheduled Medications:     allopurinol 100 mg Oral Daily   amitriptyline 50 mg Oral Nightly   enoxaparin 30 mg Subcutaneous Q12H   escitalopram 10 mg Oral Daily    mL Rectal Q8H   insulin lispro 0-24 Units Subcutaneous 4x Daily With Meals & Nightly   isosorbide mononitrate 30 mg Oral Daily   lactated ringers 1,000 mL Intravenous Once   lactulose 20 g Oral BID   meropenem 2 g Intravenous Q8H   metOLazone 2.5 mg Oral Daily   midodrine 10 mg Oral TID With Meals   nebivolol 5 mg Oral Daily   pantoprazole 40 mg Oral Daily   rosuvastatin 40 mg Oral Daily   sodium chloride 3 mL Intravenous Q12H     Current PRN Medications:  dextrose  •  dextrose  •  glucagon (human recombinant)  •  Morphine  •  ondansetron  •  ondansetron ODT  •  sodium chloride  •  sodium chloride    Allergies:    Allergies   Allergen Reactions   • Bactrim [Sulfamethoxazole-Trimethoprim] Other (See Comments)     \"RENAL FAILURE\"     Past Medical History: Degenerative arthritis.  Congestive heart failure.  Coronary artery disease.  Diabetes mellitus.  Hypertension.  Hyperlipidemia.  Sleep apnea.  Episode of pancreatitis 4-5 years ago.  They think the pancreatitis preceded his cholecystectomy.  He had a colonoscopy in 2017 which they indicate was okay.  He also reports undergoing EGD in 2017    Past Surgical History: Coronary artery bypass grafting.  Ruptured appendix.  Appendectomy.  Cholecystectomy-they were not sure if he had had gallstones..  Incision and drainage of possible spider bite.  Neck fusion.    Social History: No alcohol, tobacco, or illicit drug use.  " ".    Family History: Father colon cancer.  Mother Alzheimer's disease.  Coronary artery disease and sisters.  Colon cancer in sister.    Exposure History: No close contacts have been ill.    Review of Systems  No productive cough or sputum  No diarrhea  No melena or hematochezia  No dysuria, urinary frequency or urinary hesitancy  He did not report headache or other neurological complaints  Please see HPI for remaining pertinent positive negatives from his complete review of systems    Vital Signs:  /51 (BP Location: Right arm, Patient Position: Lying)   Pulse 83   Temp 98.1 °F (36.7 °C) (Oral)   Resp 18   Ht 182.9 cm (72\")   Wt (!) 142 kg (313 lb 8 oz)   SpO2 96%   BMI 42.52 kg/m²  Temp (24hrs), Av.1 °F (36.7 °C), Min:97.4 °F (36.3 °C), Max:98.5 °F (36.9 °C)    Physical Exam  Vital signs reviewed.  Line/IV (peripheral) site: No erythema or tenderness.  General alert, oriented, up to chair, no distress at present.  Somewhat tired appearing.  He weighs in excess of 300 pounds  Sclera nonicteric.  No conjunctival hemorrhages.  No cervical axillary or inguinal adenopathy  Lungs clear to auscultation without crackles  Heart regular rhythm without murmur  Abdomen with some superficial bruising just inferior to the umbilicus.  Can feel no palpable hematoma, warmth or fluctuance.  Did not appreciate any abdominal mass although sensitivity exam would be very difficult.  No apparent hepatosplenomegaly  Extremities with trace edema  Neurological examination seem to be nonfocal    Lab Results:  CBC: Results from last 7 days   Lab Units 19  0631 19  1742   WBC 10*3/mm3 8.41 8.59   HEMOGLOBIN g/dL 12.3* 13.1*   HEMATOCRIT % 36.4* 38.7*   PLATELETS 10*3/mm3 246 260   LYMPHOCYTE % % 12.2*  --    MONOCYTES % % 4.1  --      CMP: Results from last 7 days   Lab Units 19  0631 19  1742   SODIUM mmol/L 136 135   POTASSIUM mmol/L 4.2 4.0   CHLORIDE mmol/L 97* 97*   CO2 mmol/L 29.0 24.0 "   BUN mg/dL 16 13   CREATININE mg/dL 1.41* 1.40   CALCIUM mg/dL 8.7 9.2   BILIRUBIN mg/dL 0.9 0.9   ALK PHOS U/L 59 62   ALT (SGPT) U/L 21 20   AST (SGOT) U/L 29 34   GLUCOSE mg/dL 240* 204*     Urinalysis yesterday:   Ref Range & Units 1d ago   Color, UA Yellow, Straw Yellow    Appearance, UA Clear Clear    pH, UA 5.0 - 8.0 <=5.0    Specific Gravity, UA 1.005 - 1.030 1.012    Glucose, UA Negative 100 mg/dL (Trace) Abnormal     Ketones, UA Negative Negative    Bilirubin, UA Negative Negative    Blood, UA Negative Negative    Protein, UA Negative Negative    Leuk Esterase, UA Negative Negative    Nitrite, UA Negative Negative    Urobilinogen, UA 0.2 - 1.0 E.U./dL 1.0 E.U./dL      Blood cultures x2 yesterday-no growth    White blood cell count on February 7, 2019-8.6, hemoglobin 13.1, platelet 260    Lipase yesterday 16  Lactate yesterday 3.0  Hemoglobin A1c yesterday 9.2    Radiology:   CT scan without contrast done yesterday:  Summary:  1. Small amounts of mesenteric venous air within the upper abdomen and  small amounts of venous air within the liver. (Has this patient had  recent endoscopy or colonoscopy?)  2. No fluid, mass, or signs of bowel ischemia.    CT scan without contrast done January 9, 2019:  Moderately distended urinary bladder.  Summary:  1. No acute abnormality or significant traumatic injury is seen.    Abdominal films January 25, 2019:  IMPRESSION:  Fecal stasis.  This report was finalized on 01/25/2019 12:09 by Dr. Justen Figueroa MD.    Additional Studies Reviewed:   Stress echocardiogram January 18, 2018:  · The study is technically difficult for diagnosis due to patient body habitus.  · Baseline ECG of normal sinus rhythm noted. 1st degree AV block and poor R wave progression noted.  · The patient reported chest pain during the Dobutamine infusion.  · Despite complaints of chest discomfort, there was no ST segment deviation noted during stress.  · The echo images are technically difficult;  however, there are no obvious regional wall motion abnormalities, despite compliants of chest discomfort.  · Normal stress echo with no significant ECG or echocardiographic evidence for myocardial ischemia.    Impression:   1.  Some air and mesenteric venous system-etiology uncertain.  Not sure it is related to his current symptoms.  Favor antibiotic coverage while observing in the hospital and further workup pursued.  He has not had fever or leukocytosis to suggest infection at this point.  2.  Recent diagnosis of autonomic dysfunction.  He seemed to have some improvement with midodrine.  Would like to get a Cortrosyn stimulation test to make sure no adrenal insufficiency.  3.  Intermittent sweats-feel it would be helpful to monitor him in the hospital for evidence of fever.  The sweats may be secondary to his orthostatic symptoms or possibly fluctuations in his blood sugars.  4.  Mild renal insufficiency-possibly secondary to intravascular volume.  He may have some chronic renal disease.  5.  Diabetes mellitus    Recommendations:    Continue current treatment with meropenem  CT scan of abdomen and pelvis  Await the results of culture and imaging  Cortrosyn stimulation test  Monitor blood sugars  Follow clinical course  Continue to follow      Daniel Brothers MD  02/08/19  9:38 AM

## 2019-02-08 NOTE — PROGRESS NOTES
[facility]  INPATIENT PROGRESS NOTE    Length of Stay: 1  Date of Admission: 2/7/2019  Primary Care Physician: Del Shetty MD    Subjective     Chief Complaint:     Blood pressure diabetes management    HPI     The patient is scheduled for repeat CT scan today.  He continues to have abdominal discomfort which is off and on but is not crampy.  There is concern for mesenteric ischemia or infarction as well as obstipation.  Both gastroenterology and infectious diseases have evaluated the patient as well.  There is no plan at present for the patient to undergo a surgical exploration.  Treatment is focused on attempting to get his bowels moving.  The patient's renal function is impaired chronically with stage III kidney disease.  The patient sees Dr. Lomas for treatment of this condition.  Lactic acid levels have decreased to 1.3.  Amylase and lipase are within normal limits.  Hemoglobin A1c is elevated at 8.5%.  White blood cell count remains within normal limits without left shift.  Because of the patient's autonomic dysfunction and variable blood pressures including orthostatic hypotension, his antihypertensives were recently discontinued by Dr. Lomas.  The patient and his wife request that his antihypertensives not be restarted in the hospital.  Metolazone of be discontinued as intravascular depletion can lead to constipation.      Review of Systems     All pertinent negatives and positives are as above. All other systems have been reviewed and are negative unless otherwise stated.     Objective    Temp:  [97.4 °F (36.3 °C)-98.5 °F (36.9 °C)] 98.5 °F (36.9 °C)  Heart Rate:  [] 89  Resp:  [17-32] 18  BP: (115-176)/() 118/63    Lab Results (last 24 hours)     Procedure Component Value Units Date/Time    Lactic Acid, Plasma [[id number]]  (Normal) Collected:  02/08/19 1329    Specimen:  Blood Updated:  02/08/19 1353     Lactate 1.3 mmol/L     POC Glucose  Once [[id number]]  (Abnormal) Collected:  02/08/19 1140    Specimen:  Blood Updated:  02/08/19 1152     Glucose 246 mg/dL      Comment: : [id number] Willam BaughilyMeter ID: [id number]       POC Glucose Once [[id number]]  (Abnormal) Collected:  02/08/19 0858    Specimen:  Blood Updated:  02/08/19 0910     Glucose 253 mg/dL      Comment: : [id number] Michele Jacobson DestinyMeter ID: [id number]       Hemoglobin A1c [[id number]] Collected:  02/08/19 0631    Specimen:  Blood Updated:  02/08/19 0908     Hemoglobin A1C 8.5 %     Narrative:       Less than 6.0           Non-Diabetic Range  6.0-7.0                 ADA Therapeutic Target  Greater than 7.0        Action Suggested    CBC & Differential [[id number]] Collected:  02/08/19 0631    Specimen:  Blood Updated:  02/08/19 0743    Narrative:       The following orders were created for panel order CBC & Differential.  Procedure                               Abnormality         Status                     ---------                               -----------         ------                     Manual Differential[[id number]]          Abnormal            Final result               CBC Auto Differential[[id number]]        Abnormal            Final result                 Please view results for these tests on the individual orders.    CBC Auto Differential [[id number]]  (Abnormal) Collected:  02/08/19 0631    Specimen:  Blood Updated:  02/08/19 0743     WBC 8.41 10*3/mm3      RBC 4.08 10*6/mm3      Hemoglobin 12.3 g/dL      Hematocrit 36.4 %      MCV 89.2 fL      MCH 30.1 pg      MCHC 33.8 g/dL      RDW 14.5 %      RDW-SD 46.4 fl      MPV 11.2 fL      Platelets 246 10*3/mm3     Manual Differential [[id number]]  (Abnormal) Collected:  02/08/19 0631    Specimen:  Blood Updated:  02/08/19 0743     Neutrophil % 81.6 %      Lymphocyte % 12.2 %      Monocyte % 4.1 %      Eosinophil % 1.0 %      Basophil % 1.0 %      Neutrophils Absolute 6.86 10*3/mm3      Lymphocytes Absolute 1.03 10*3/mm3       Monocytes Absolute 0.34 10*3/mm3      Eosinophils Absolute 0.08 10*3/mm3      Basophils Absolute 0.08 10*3/mm3      RBC Morphology Normal     WBC Morphology Normal     Platelet Morphology Normal    Lipase [[id number]]  (Normal) Collected:  02/08/19 0631    Specimen:  Blood Updated:  02/08/19 0741     Lipase 25 U/L     Amylase [[id number]]  (Normal) Collected:  02/08/19 0631    Specimen:  Blood Updated:  02/08/19 0741     Amylase 53 U/L     Magnesium [[id number]]  (Normal) Collected:  02/08/19 0631    Specimen:  Blood Updated:  02/08/19 0741     Magnesium 2.2 mg/dL     Comprehensive Metabolic Panel [[id number]]  (Abnormal) Collected:  02/08/19 0631    Specimen:  Blood Updated:  02/08/19 0741     Glucose 240 mg/dL      BUN 16 mg/dL      Creatinine 1.41 mg/dL      Sodium 136 mmol/L      Potassium 4.2 mmol/L      Chloride 97 mmol/L      CO2 29.0 mmol/L      Calcium 8.7 mg/dL      Total Protein 6.4 g/dL      Albumin 3.90 g/dL      ALT (SGPT) 21 U/L      AST (SGOT) 29 U/L      Alkaline Phosphatase 59 U/L      Total Bilirubin 0.9 mg/dL      eGFR Non African Amer 51 mL/min/1.73      Globulin 2.5 gm/dL      A/G Ratio 1.6 g/dL      BUN/Creatinine Ratio 11.3     Anion Gap 10.0 mmol/L     Protime-INR [[id number]]  (Normal) Collected:  02/08/19 0631    Specimen:  Blood Updated:  02/08/19 0728     Protime 13.1 Seconds      INR 0.96    Blood Culture - Blood, Arm, Right [[id number]] Collected:  02/07/19 1800    Specimen:  Blood from Arm, Right Updated:  02/08/19 0631     Blood Culture No growth at less than 24 hours    Blood Culture - Blood, Arm, Left [[id number]] Collected:  02/07/19 1742    Specimen:  Blood from Arm, Left Updated:  02/08/19 0601     Blood Culture No growth at less than 24 hours    Troponin [[id number]]  (Normal) Collected:  02/07/19 2119    Specimen:  Blood Updated:  02/07/19 2152     Troponin I <0.012 ng/mL     Lactic Acid, Reflex [[id number]]  (Normal) Collected:  02/07/19 2119    Specimen:  Blood Updated:  02/07/19  2140     Lactate 1.9 mmol/L     Urinalysis With Culture If Indicated - Urine, Clean Catch [[id number]]  (Abnormal) Collected:  02/07/19 2120    Specimen:  Urine, Clean Catch Updated:  02/07/19 2131     Color, UA Yellow     Appearance, UA Clear     pH, UA <=5.0     Specific Gravity, UA 1.012     Glucose,  mg/dL (Trace)     Ketones, UA Negative     Bilirubin, UA Negative     Blood, UA Negative     Protein, UA Negative     Leuk Esterase, UA Negative     Nitrite, UA Negative     Urobilinogen, UA 1.0 E.U./dL    Narrative:       Urine microscopic not indicated.    Lactic Acid, Reflex Timer (This will reflex a repeat order 3-3:15 hours after ordered.) [[id number]] Collected:  02/07/19 1742    Specimen:  Blood from Arm, Left Updated:  02/07/19 2115     Extra Tube Hold for add-ons.     Comment: Auto resulted.       [place] Draw [[id number]] Collected:  02/07/19 1742    Specimen:  Blood Updated:  02/07/19 1846    Narrative:       The following orders were created for panel order [place] Draw.  Procedure                               Abnormality         Status                     ---------                               -----------         ------                     Light Blue Top[[id number]]                                   Final result               Green Top (Gel)[[id number]]                                  Final result               Lavender Top[[id number]]                                     Final result               Red Top[[id number]]                                          Final result                 Please view results for these tests on the individual orders.    Light Blue Top [[id number]] Collected:  02/07/19 1742    Specimen:  Blood from Arm, Left Updated:  02/07/19 1846     Extra Tube hold for add-on     Comment: Auto resulted       Green Top (Gel) [[id number]] Collected:  02/07/19 1742    Specimen:  Blood from Arm, Left Updated:  02/07/19 1846     Extra Tube Hold for add-ons.     Comment: Auto resulted.        Lavender Top [[id number]] Collected:  02/07/19 1742    Specimen:  Blood from Arm, Left Updated:  02/07/19 1846     Extra Tube hold for add-on     Comment: Auto resulted       Red Top [[id number]] Collected:  02/07/19 1742    Specimen:  Blood from Arm, Left Updated:  02/07/19 1846     Extra Tube Hold for add-ons.     Comment: Auto resulted.       Troponin [[id number]]  (Normal) Collected:  02/07/19 1742    Specimen:  Blood from Arm, Left Updated:  02/07/19 1829     Troponin I <0.012 ng/mL     BNP [[id number]]  (Normal) Collected:  02/07/19 1742    Specimen:  Blood from Arm, Left Updated:  02/07/19 1829     proBNP 574.0 pg/mL     Lipase [[id number]]  (Abnormal) Collected:  02/07/19 1742    Specimen:  Blood from Arm, Left Updated:  02/07/19 1815     Lipase 16 U/L     Lactic Acid, Plasma [[id number]]  (Abnormal) Collected:  02/07/19 1742    Specimen:  Blood from Arm, Left Updated:  02/07/19 1811     Lactate 3.0 mmol/L     Comprehensive Metabolic Panel [[id number]]  (Abnormal) Collected:  02/07/19 1742    Specimen:  Blood from Arm, Left Updated:  02/07/19 1809     Glucose 204 mg/dL      BUN 13 mg/dL      Creatinine 1.40 mg/dL      Sodium 135 mmol/L      Potassium 4.0 mmol/L      Chloride 97 mmol/L      CO2 24.0 mmol/L      Calcium 9.2 mg/dL      Total Protein 7.1 g/dL      Albumin 4.50 g/dL      ALT (SGPT) 20 U/L      AST (SGOT) 34 U/L      Alkaline Phosphatase 62 U/L      Total Bilirubin 0.9 mg/dL      eGFR Non African Amer 51 mL/min/1.73      Globulin 2.6 gm/dL      A/G Ratio 1.7 g/dL      BUN/Creatinine Ratio 9.3     Anion Gap 14.0 mmol/L     CBC & Differential [[id number]] Collected:  02/07/19 1742    Specimen:  Blood Updated:  02/07/19 1754    Narrative:       The following orders were created for panel order CBC & Differential.  Procedure                               Abnormality         Status                     ---------                               -----------         ------                     CBC Auto  Differential[[id number]]        Abnormal            Final result                 Please view results for these tests on the individual orders.    CBC Auto Differential [[id number]]  (Abnormal) Collected:  02/07/19 1742    Specimen:  Blood from Arm, Left Updated:  02/07/19 1754     WBC 8.59 10*3/mm3      RBC 4.33 10*6/mm3      Hemoglobin 13.1 g/dL      Hematocrit 38.7 %      MCV 89.4 fL      MCH 30.3 pg      MCHC 33.9 g/dL      RDW 14.4 %      RDW-SD 46.5 fl      MPV 10.6 fL      Platelets 260 10*3/mm3      Neutrophil % 73.7 %      Lymphocyte % 13.3 %      Monocyte % 8.3 %      Eosinophil % 3.8 %      Basophil % 0.6 %      Immature Grans % 0.3 %      Neutrophils, Absolute 6.33 10*3/mm3      Lymphocytes, Absolute 1.14 10*3/mm3      Monocytes, Absolute 0.71 10*3/mm3      Eosinophils, Absolute 0.33 10*3/mm3      Basophils, Absolute 0.05 10*3/mm3      Immature Grans, Absolute 0.03 10*3/mm3      nRBC 0.0 /100 WBC           Imaging Results (last 24 hours)     Procedure Component Value Units Date/Time    CT Abdomen Pelvis Without Contrast [[id number]] Collected:  02/07/19 1942     Updated:  02/07/19 1955    Narrative:       EXAMINATION: CT ABDOMEN PELVIS WO CONTRAST-      2/7/2019 7:11 PM CST     HISTORY: Fall injury. Blunt trauma. Low abdominal pain.     In order to have a CT radiation dose as low as reasonably achievable  Automated Exposure Control was utilized for adjustment of the mA and/or  KV according to patient size.     DLP in mGycm= 1797.     Noncontrast abdomen pelvis CT compared with 1/9/2019.     Normal heart size.  CABG changes with coronary artery calcification.  No acute abnormality at the lung bases.     Trace amounts of air are noted within the hepatic parenchyma in the  subdiaphragmatic region. This has the appearance of intravenous air and  a few mesenteric veins adjacent to the stomach and transverse colon also  show small amounts of air. There is no colonic wall thickening to  indicate ischemia.     No  portal vein or superior mesenteric vein area seen.     There is no free fluid.  No bowel dilation.     No pelvic mass or fluid.  There is moderate fecal material throughout the colon.  No diverticulitis or colitis.     Normal and symmetric adrenal glands and kidneys.  3 cm right renal cyst noted.     Summary:  1. Small amounts of mesenteric venous air within the upper abdomen and  small amounts of venous air within the liver. (Has this patient had  recent endoscopy or colonoscopy?)  2. No fluid, mass, or signs of bowel ischemia.                                   This report was finalized on 02/07/2019 19:51 by Dr. Gomez Mcgill MD.    CT Cervical Spine Without Contrast [[id number]] Collected:  02/07/19 1941     Updated:  02/07/19 1945    Narrative:       EXAMINATION: CT CERVICAL SPINE WO CONTRAST-      2/7/2019 7:11 PM CST     HISTORY: Fall injury. Neck pain.     In order to have a CT radiation dose as low as reasonably achievable  Automated Exposure Control was utilized for adjustment of the mA and/or  KV according to patient size.     DLP in mGycm= 253.     Axial, sagittal, and coronal noncontrast CT imaging.     Vertebral bodies and posterior elements are intact.     Reconstructed sagittal images show normal curvature.   There is no malalignment. Prevertebral soft tissues are normal.   Facet joints align normally.     Reconstructed coronal images show normal lateral mass alignment.     C6-7 discectomy with anterior plate and screw fusion.       Impression:       1. No acute bony abnormality.                                         This report was finalized on 02/07/2019 19:42 by Dr. Gomez Mcgill MD.    CT Head Without Contrast [[id number]] Collected:  02/07/19 1941     Updated:  02/07/19 1944    Narrative:       EXAMINATION: CT HEAD WO CONTRAST-      2/7/2019 7:11 PM CST     HISTORY: fall, head injury     In order to have a CT radiation dose as low as reasonably achievable  Automated Exposure Control was utilized  for adjustment of the mA and/or  KV according to patient size.     DLP in mGycm= 818.     Axial, sagittal, and coronal noncontrast CT imaging of the head.     The visualized paranasal sinuses are clear.     The brain and ventricles have an age appropriate appearance.   There is no hemorrhage or mass-effect.   No acute infarction is seen.     No calvarial abnormality.       Impression:       1. No acute intracranial abnormality is seen.                                         This report was finalized on 02/07/2019 19:41 by Dr. Gomez Mcgill MD.    XR Chest 1 View [[id number]] Collected:  02/07/19 1832     Updated:  02/07/19 1835    Narrative:       EXAMINATION: XR CHEST 1 VW-     2/7/2019 6:16 PM CST     HISTORY: Short of breath.     1 view chest x-ray compared with 1/17/2018.     CABG changes.     Heart size is normal.  The mediastinum is within normal limits.      The lungs are normally expanded with no pneumonia or pneumothorax.       No congestive failure changes.                                                                       Impression:       1. No acute disease.        This report was finalized on 02/07/2019 18:32 by Dr. Gomez Mcgill MD.             Intake/Output Summary (Last 24 hours) at 2/8/2019 1357  Last data filed at 2/8/2019 1300  Gross per 24 hour   Intake 1060 ml   Output --   Net 1060 ml       Physical Exam   Constitutional: He is oriented to person, place, and time. He appears well-developed and well-nourished. He is cooperative.   Morbidly obese   HENT:   Head: Normocephalic and atraumatic.   Nose: Nose normal.   Mouth/Throat: Oropharynx is clear and moist.   Eyes: Conjunctivae are normal. No scleral icterus.   Neck: Normal range of motion. Neck supple.   Cardiovascular: Normal rate and regular rhythm.   No murmur heard.  Pulmonary/Chest: Effort normal and breath sounds normal. No respiratory distress.   Abdominal: Soft. He exhibits distension. There is tenderness (RLQ).   Musculoskeletal:  Normal range of motion. He exhibits no edema.   Neurological: He is alert and oriented to person, place, and time. Coordination normal.   Skin: Skin is warm and dry.   Psychiatric: He has a normal mood and affect. His behavior is normal. Judgment and thought content normal.       Results Review:  I have reviewed the labs, radiology results, and diagnostic studies since my last progress note and made treatment changes reflective of the results.   I have reviewed the current medications.    Assessment/Plan     Active Hospital Problems    Diagnosis   • Abdominal pain   • Constipation   • Orthostatic hypotension   • Obesity, unspecified obesity severity, unspecified obesity type   • HTN (hypertension), benign   • Type 2 diabetes mellitus with hyperglycemia, with long-term current use of insulin (CMS/[facility])     hold insulin the am of procedure     • Chest pain, non-cardiac   • CAD (coronary artery disease)   • CHF (congestive heart failure) (CMS/[facility])       PLAN:  Continue sliding scale insulin  Discontinue nebivolol and metolazone    aPyam Keyes DO   02/08/19   1:57 PM

## 2019-02-08 NOTE — PROGRESS NOTES
LOS: 1 day   Patient Care Team:  Del Shetty MD as PCP - General (Family Medicine)  Juan Chua DO as PCP - Claims Attributed  Emeka Garay MD as Cardiologist (Cardiology)  Cristopher Hannah MD as Consulting Physician (Gastroenterology)  Brian Lomas MD as Consulting Physician (Nephrology)    Chief Complaint: Abdominal pain  Subjective     Subjective     He has been hospitalized for the past 12 hours there is no FELY's for him including stool output, he has had 2 enemas, he states he has not had any results, he feels better, but this patient with renal dysfunction also with multiple enemas has no I& os?!.  Currently though he does feel better he is passing flatus no nausea with the mag citrate and the enemas he reports no output rectal exam though shows the large amount of stool has been evacuated.  Objective      Objective     Vital Signs  Temp:  [97.4 °F (36.3 °C)-98.5 °F (36.9 °C)] 98.1 °F (36.7 °C)  Heart Rate:  [] 83  Resp:  [17-32] 18  BP: (115-176)/() 135/51    Intake & Output (last 3 days)     None          Physical Exam:     General Appearance:    Alert, cooperative, in no acute distress   Lungs:     Clear to auscultation,respirations regular, even and                  unlabored    Heart:    Regular rhythm and normal rate, normal S1 and S2, no            murmur, no gallop, no rub, no click   Chest Wall:    No abnormalities observed   Abdomen:    Obese abdomen, normal bowel sounds, slight tenderness on the left upper abdomen, but the diffuse abdominal pain he had last night seems to be improved, once again bowel sounds continue to be normal as they were last night, rectal exam shows the large amount of stool present last night has been evacuated.  Some gas was expressed.  But no large amount of stool noted by patient.    White count is unchanged at 8.4, hematocrit 36 electrolytes unchanged BUN and creatinine of 16 and 0.41, lactate was reduced initially from 3 to  currently 1.9.  Liver functions are normal   Currently plan to check a repeat CT scan this morning to assure no progression although he seems better, we will try lactulose for stimulation of bowel function,     Results Review:     I reviewed the patient's new clinical results.  I reviewed the patient's new imaging results and agree with the interpretation.    Results from last 7 days   Lab Units 02/08/19  0631 02/07/19  1742   WBC 10*3/mm3 8.41 8.59   HEMOGLOBIN g/dL 12.3* 13.1*   HEMATOCRIT % 36.4* 38.7*   PLATELETS 10*3/mm3 246 260        Results from last 7 days   Lab Units 02/08/19  0631 02/07/19  1742   SODIUM mmol/L 136 135   POTASSIUM mmol/L 4.2 4.0   CHLORIDE mmol/L 97* 97*   CO2 mmol/L 29.0 24.0   BUN mg/dL 16 13   CREATININE mg/dL 1.41* 1.40   CALCIUM mg/dL 8.7 9.2   BILIRUBIN mg/dL 0.9 0.9   ALK PHOS U/L 59 62   ALT (SGPT) U/L 21 20   AST (SGOT) U/L 29 34   GLUCOSE mg/dL 240* 204*       Assessment/Plan     Assessment/Plan       CHF (congestive heart failure) (CMS/[facility])    CAD (coronary artery disease)    Chest pain, non-cardiac    Obesity, unspecified obesity severity, unspecified obesity type    HTN (hypertension), benign    Type 2 diabetes mellitus with hyperglycemia, with long-term current use of insulin (CMS/[facility])    Orthostatic hypotension    Abdominal pain    Constipation      Patient with the above medical problems admitted last night with some portal gas etiology is unclear CT scans were normal except for this gas and a large amount of stool.  My only thought was potential translocation from of the bacteria in the colon from chronic obstipation constipation he is chronically on lactulose and Linzess to stimulate bowel function.  And that would be my fastest horse  to bet on.  Follow-up later today, stimulate again with lactulose this seems to work well for him, also repeat a CT scan.  Infectious disease to render an opinion as well on the portal gas.      Ken Perry MD  02/08/19  8:27  AM      Time: time spent with patient 15 minutes     EMR Dragon/Transcription disclaimer: Much of this encounter note is an electronic transcription/translation of spoken language to printed text. The electronic translation of spoken language may permit erroneous, or at times, nonsensical words or phrases to be inadvertently transcribed; although I have reviewed the note for such errors, some may still exist.

## 2019-02-08 NOTE — CONSULTS
[facility] Gastroenterology  Initial Inpatient Consult Note    Referring Provider: Ken Perry MD    Reason for Consultation: Portal venous gas etiology unknown, gastroparesis, colonic atony    Subjective     History of present illness:        63-year-old male admitted with abdominal pain  and abdominal CT scan the abdomen pelvis    He has history of chronic constipation and takes Linzess 290 mcg and lactulose on a daily basis.  He does take narcotics as well.  He is followed by Dr. Hannah.  Last office visit was January 3, 2019 at that time he was doing much better in regards to constipation.  States that he has been moving his bowels a little bit every day.  Last evening he started with acute lower abdominal pain.  States he has never had pain like this before.  Did have some diaphoresis as well as chills.  CT scan abdomen and pelvis without IV contrast this admission shows small amounts of mesenteric venous air within the upper abdomen and small amount of venous air within the liver.  He also had a CT scan of the abdomen and pelvis January 9, 2019 for left-sided abdominal pain which showed nothing acute.    He has had intermittent nausea and vomiting over the last 2 weeks.  Has also had syncopal episodes over the last several weeks.  He was recently diagnosed with autonomic nervous system neuropathy and reduced blood pressure and is on midrodrine.  He is being followed by Dr. Lomas.  He states the near syncope episodes have improved since being on Midrodrine.     He has not had any rectal bleeding.  No black or bright red blood per rectum.  He take Protonix daily.  No hematemesis.  His gallbladder is gone.  No history of recent surgeries or procedures such as ERCP.  He denies vascular disease.  LFTs are normal,    Lipase 16, lactate was 3.0, white blood cell count is normal, BUN normal, creatinine 1.40, blood cultures have been ordered as well.    Last upper endoscopy was May 2017 by Dr. Hannah for  midepigastric pain and that was normal.  Last colonoscopy was January 2017 for generalized abdominal pain and that was normal as well.    He does tell me he had a sister who had infarcted bowel requiring emergent surgery and a colostomy.  He does not believe there is a coagulopathic disorder in his family    Infectious disease as well.  He is n.p.o. and for repeat CAT scan of the abdomen and pelvis today.  He is currently on Merrem.  Hog enemas are also ordered.    The patient had another episode of abdominal discomfort today about noontime.  It was not as intense as yesterday.  He stated it felt like he needed to move his bowels but could not.  He did not have any syncope episodes yesterday or today.    Past Medical History:  Past Medical History:   Diagnosis Date   • Arthritis    • CHF (congestive heart failure) (CMS/HCC)    • Coronary artery disease    • Diabetes mellitus (CMS/HCC)    • Hyperlipidemia    • Hypertension    • Myocardial infarction (CMS/HCC)    • Pancreatitis    • Renal disorder    • Sleep apnea with use of continuous positive airway pressure (CPAP)        Past Surgical History:  Past Surgical History:   Procedure Laterality Date   • ABDOMINAL SURGERY     • APPENDECTOMY     • BACK SURGERY     • CARDIAC SURGERY     • CATARACT EXTRACTION     • CERVICAL SPINE SURGERY     • COLONOSCOPY N/A 1/31/2017    Normal exam repeat in 5 years   • COLONOSCOPY W/ POLYPECTOMY  03/04/2014    Hyperplastic polyp   • CORONARY ARTERY BYPASS GRAFT  10/2015   • ENDOSCOPY  04/13/2011    Gastritis with hemorrhage   • ENDOSCOPY N/A 5/5/2017    Normal exam   • INCISION AND DRAINAGE OF WOUND Left 09/2007    spider bite        Social History:   Social History     Tobacco Use   • Smoking status: Never Smoker   • Smokeless tobacco: Never Used   • Tobacco comment: smoked in highschool   Substance Use Topics   • Alcohol use: No        Family History:  Family History   Problem Relation Age of Onset   • Colon cancer Father    • Heart  disease Father    • Colon cancer Sister    • Colon polyps Sister    • Alzheimer's disease Mother    • Coronary artery disease Sister    • Coronary artery disease Sister        Home Meds:  Medications Prior to Admission   Medication Sig Dispense Refill Last Dose   • bumetanide (BUMEX) 2 MG tablet Take 2 mg by mouth Daily.      • allopurinol (ZYLOPRIM) 100 MG tablet Take 100 mg by mouth Daily.   Taking   • amitriptyline (ELAVIL) 50 MG tablet Take 50 mg by mouth Every Night.   Taking   • aspirin 81 MG EC tablet Take 81 mg by mouth Daily.   Taking   • escitalopram (LEXAPRO) 10 MG tablet Take 10 mg by mouth Daily.   Taking   • insulin regular (humuLIN R) 500 UNIT/ML CONCENTRATED injection Inject 100 Units under the skin 3 (Three) Times a Day Before Meals. Packaging states 100 units with regular meals; 120 units with large meals or 360 units daily   Taking   • isosorbide mononitrate (IMDUR) 30 MG 24 hr tablet Take 30 mg by mouth Daily.   Taking   • linaclotide (LINZESS) 290 MCG capsule capsule Take 290 mcg by mouth Every Morning Before Breakfast.   Taking   • metOLazone (ZAROXOLYN) 2.5 MG tablet Take 2.5 mg by mouth Daily.   Taking   • midodrine (PROAMATINE) 5 MG tablet Take 10 mg by mouth 3 (Three) Times a Day.  0 Taking   • nebivolol (BYSTOLIC) 5 MG tablet Take 5 mg by mouth Daily.   Taking   • ondansetron ODT (ZOFRAN-ODT) 4 MG disintegrating tablet Take 4 mg by mouth 4 (Four) Times a Day As Needed for Nausea or Vomiting.   Taking   • oxyCODONE-acetaminophen (PERCOCET)  MG per tablet Take 1 tablet by mouth 2 (Two) Times a Day With Meals.   Taking   • pantoprazole (PROTONIX) 40 MG EC tablet Take 1 tablet by mouth Daily. 90 tablet 3 Taking   • polyethylene glycol (GoLYTELY) 236 g solution Take as directed by office instructions. 4000 mL 0 Taking   • rosuvastatin (CRESTOR) 40 MG tablet Take 40 mg by mouth Daily.   Taking       Current Meds:  Current Facility-Administered Medications   Medication Dose Route Frequency  Provider Last Rate Last Dose   • allopurinol (ZYLOPRIM) tablet 100 mg  100 mg Oral Daily Raquel Duncan APRN   100 mg at 02/08/19 0858   • amitriptyline (ELAVIL) tablet 50 mg  50 mg Oral Nightly Raquel Duncan APRSHERRILL       • dextrose (D50W) 25 g/ 50mL Intravenous Solution 25 g  25 g Intravenous Q15 Min PRN Raquel Duncan APRN       • dextrose (GLUTOSE) oral gel 15 g  15 g Oral Q15 Min PRN Raquel Duncan APRSHERRILL       • enoxaparin (LOVENOX) syringe 30 mg  30 mg Subcutaneous Q12H Ken Perry MD   30 mg at 02/08/19 0853   • escitalopram (LEXAPRO) tablet 10 mg  10 mg Oral Daily Raquel Duncan APRN   10 mg at 02/08/19 0858   • glucagon (human recombinant) (GLUCAGEN DIAGNOSTIC) injection 1 mg  1 mg Subcutaneous PRN Raquel Duncan APRN       • HOG enema 360 mL  360 mL Rectal Q8H Ken Perry MD   Stopped at 02/08/19 0853   • insulin lispro (humaLOG) injection 0-24 Units  0-24 Units Subcutaneous 4x Daily With Meals & Nightly Raquel Duncan APRN   8 Units at 02/08/19 1244   • isosorbide mononitrate (IMDUR) 24 hr tablet 30 mg  30 mg Oral Daily Raquel Duncan APRN   30 mg at 02/08/19 0906   • meropenem (MERREM) 2 g in sodium chloride 0.9 % 100 mL IVPB  2 g Intravenous Q8H Ken Perry MD   2 g at 02/08/19 1141   • metOLazone (ZAROXOLYN) tablet 2.5 mg  2.5 mg Oral Daily Raquel Duncan APRN       • midodrine (PROAMATINE) tablet 10 mg  10 mg Oral TID With Meals Raquel Duncan APRN   10 mg at 02/08/19 1143   • Morphine sulfate (PF) injection 4 mg  4 mg Intravenous Q2H PRN Ken Perry MD   4 mg at 02/08/19 1031   • nebivolol (BYSTOLIC) tablet 5 mg  5 mg Oral Daily Duncan, Raquel D, APRN       • ondansetron (ZOFRAN) 8 mg/50 mL NS IVPB  8 mg Intravenous Q6H PRN Ken Perry MD   8 mg at 02/07/19 2327   • ondansetron ODT (ZOFRAN-ODT) disintegrating tablet 8 mg  8 mg Oral Q6H PRN Ken Perry MD   8 mg at 02/08/19 1031   • pantoprazole (PROTONIX) EC tablet 40 mg  40 mg  "Oral Daily Raquel Duncan APRSHERRILL   40 mg at 02/08/19 0857   • polyethylene glycol (MIRALAX) powder 17 g  17 g Oral Daily Justyna Barry APRN       • rosuvastatin (CRESTOR) tablet 40 mg  40 mg Oral Daily Raquel Duncan APRN   40 mg at 02/08/19 0858   • sodium chloride 0.9 % flush 1-10 mL  1-10 mL Intravenous PRN Ken Perry MD       • sodium chloride 0.9 % flush 10 mL  10 mL Intravenous PRN Ken Perry MD       • sodium chloride 0.9 % flush 3 mL  3 mL Intravenous Q12H Ken Perry MD   3 mL at 02/08/19 1023       Allergies:  Allergies   Allergen Reactions   • Bactrim [Sulfamethoxazole-Trimethoprim] Other (See Comments)     \"RENAL FAILURE\"       Review of Systems   Review of Systems   Constitutional: Positive for chills, diaphoresis and unexpected weight change. Negative for appetite change, fatigue and fever.   HENT: Negative for sore throat and trouble swallowing.    Eyes: Negative for visual disturbance.   Respiratory: Negative for cough, chest tightness, shortness of breath and wheezing.    Cardiovascular: Negative for chest pain and palpitations.   Gastrointestinal: Positive for abdominal pain, constipation, nausea and vomiting. Negative for abdominal distention, anal bleeding, blood in stool and rectal pain.        As mentioned in hpi   Genitourinary: Negative for difficulty urinating and hematuria.   Musculoskeletal: Negative for arthralgias and back pain.   Skin: Negative for color change and rash.   Neurological: Positive for syncope and weakness. Negative for dizziness, seizures, light-headedness and headaches.   Hematological: Negative for adenopathy.   Psychiatric/Behavioral: Negative for confusion. The patient is not nervous/anxious.         Objective     Vital Signs  Temp:  [97.4 °F (36.3 °C)-98.5 °F (36.9 °C)] 98.5 °F (36.9 °C)  Heart Rate:  [] 89  Resp:  [17-32] 18  BP: (115-176)/() 118/63    Physical Exam:   Physical Exam   Constitutional: He appears well-developed " and well-nourished. No distress.   HENT:   Head: Normocephalic and atraumatic.   Eyes: EOM are normal. No scleral icterus.   Neck: Neck supple. No JVD present.   Cardiovascular: Normal rate, regular rhythm and normal heart sounds.   Pulmonary/Chest: Effort normal and breath sounds normal.   Abdominal: Soft. Bowel sounds are normal. He exhibits no distension and no mass. There is tenderness (mild tenderness ruq and lower abdomen, also noted bruising to rlq). There is no rebound and no guarding.   Musculoskeletal: Normal range of motion. He exhibits no deformity.   Neurological: He is alert.   Skin: Skin is warm and dry. No rash noted.   Psychiatric: He has a normal mood and affect. His behavior is normal.   Vitals reviewed.      Results Review:   I reviewed the patient's new clinical results.  I reviewed the patient's new imaging results and agree with the interpretation.  I reviewed the patient's other test results and agree with the interpretation    Lab Results (most recent)     Procedure Component Value Units Date/Time    CBC & Differential [[id number]] Collected:  02/08/19 0631    Specimen:  Blood Updated:  02/08/19 0743    Narrative:       The following orders were created for panel order CBC & Differential.  Procedure                               Abnormality         Status                     ---------                               -----------         ------                     Manual Differential[[id number]]          Abnormal            Final result               CBC Auto Differential[[id number]]        Abnormal            Final result                 Please view results for these tests on the individual orders.    CBC Auto Differential [[id number]]  (Abnormal) Collected:  02/08/19 0631    Specimen:  Blood Updated:  02/08/19 0743     WBC 8.41 10*3/mm3      RBC 4.08 10*6/mm3      Hemoglobin 12.3 g/dL      Hematocrit 36.4 %      MCV 89.2 fL      MCH 30.1 pg      MCHC 33.8 g/dL      RDW 14.5 %      RDW-SD 46.4 fl       MPV 11.2 fL      Platelets 246 10*3/mm3     Manual Differential [[id number]]  (Abnormal) Collected:  02/08/19 0631    Specimen:  Blood Updated:  02/08/19 0743     Neutrophil % 81.6 %      Lymphocyte % 12.2 %      Monocyte % 4.1 %      Eosinophil % 1.0 %      Basophil % 1.0 %      Neutrophils Absolute 6.86 10*3/mm3      Lymphocytes Absolute 1.03 10*3/mm3      Monocytes Absolute 0.34 10*3/mm3      Eosinophils Absolute 0.08 10*3/mm3      Basophils Absolute 0.08 10*3/mm3      RBC Morphology Normal     WBC Morphology Normal     Platelet Morphology Normal    Lipase [[id number]]  (Normal) Collected:  02/08/19 0631    Specimen:  Blood Updated:  02/08/19 0741     Lipase 25 U/L     Amylase [[id number]]  (Normal) Collected:  02/08/19 0631    Specimen:  Blood Updated:  02/08/19 0741     Amylase 53 U/L     Magnesium [[id number]]  (Normal) Collected:  02/08/19 0631    Specimen:  Blood Updated:  02/08/19 0741     Magnesium 2.2 mg/dL     Comprehensive Metabolic Panel [[id number]]  (Abnormal) Collected:  02/08/19 0631    Specimen:  Blood Updated:  02/08/19 0741     Glucose 240 mg/dL      BUN 16 mg/dL      Creatinine 1.41 mg/dL      Sodium 136 mmol/L      Potassium 4.2 mmol/L      Chloride 97 mmol/L      CO2 29.0 mmol/L      Calcium 8.7 mg/dL      Total Protein 6.4 g/dL      Albumin 3.90 g/dL      ALT (SGPT) 21 U/L      AST (SGOT) 29 U/L      Alkaline Phosphatase 59 U/L      Total Bilirubin 0.9 mg/dL      eGFR Non African Amer 51 mL/min/1.73      Globulin 2.5 gm/dL      A/G Ratio 1.6 g/dL      BUN/Creatinine Ratio 11.3     Anion Gap 10.0 mmol/L     Protime-INR [[id number]]  (Normal) Collected:  02/08/19 0631    Specimen:  Blood Updated:  02/08/19 0728     Protime 13.1 Seconds      INR 0.96    Hemoglobin A1c [[id number]] Collected:  02/08/19 0631    Specimen:  Blood Updated:  02/08/19 0714    Blood Culture - Blood, Arm, Right [[id number]] Collected:  02/07/19 1800    Specimen:  Blood from Arm, Right Updated:  02/08/19 0631     Blood  Culture No growth at less than 24 hours    Blood Culture - Blood, Arm, Left [[id number]] Collected:  02/07/19 1742    Specimen:  Blood from Arm, Left Updated:  02/08/19 0601     Blood Culture No growth at less than 24 hours    Troponin [[id number]]  (Normal) Collected:  02/07/19 2119    Specimen:  Blood Updated:  02/07/19 2152     Troponin I <0.012 ng/mL     Lactic Acid, Reflex [[id number]]  (Normal) Collected:  02/07/19 2119    Specimen:  Blood Updated:  02/07/19 2140     Lactate 1.9 mmol/L     Urinalysis With Culture If Indicated - Urine, Clean Catch [[id number]]  (Abnormal) Collected:  02/07/19 2120    Specimen:  Urine, Clean Catch Updated:  02/07/19 2131     Color, UA Yellow     Appearance, UA Clear     pH, UA <=5.0     Specific Gravity, UA 1.012     Glucose,  mg/dL (Trace)     Ketones, UA Negative     Bilirubin, UA Negative     Blood, UA Negative     Protein, UA Negative     Leuk Esterase, UA Negative     Nitrite, UA Negative     Urobilinogen, UA 1.0 E.U./dL    Narrative:       Urine microscopic not indicated.    Lactic Acid, Reflex Timer (This will reflex a repeat order 3-3:15 hours after ordered.) [[id number]] Collected:  02/07/19 1742    Specimen:  Blood from Arm, Left Updated:  02/07/19 2115     Extra Tube Hold for add-ons.     Comment: Auto resulted.       [place] Draw [[id number]] Collected:  02/07/19 1742    Specimen:  Blood Updated:  02/07/19 1846    Narrative:       The following orders were created for panel order [place] Draw.  Procedure                               Abnormality         Status                     ---------                               -----------         ------                     Light Blue Top[[id number]]                                   Final result               Green Top (Gel)[[id number]]                                  Final result               Lavender Top[[id number]]                                     Final result               Red Top[[id number]]                                           Final result                 Please view results for these tests on the individual orders.    Light Blue Top [[id number]] Collected:  02/07/19 1742    Specimen:  Blood from Arm, Left Updated:  02/07/19 1846     Extra Tube hold for add-on     Comment: Auto resulted       Green Top (Gel) [[id number]] Collected:  02/07/19 1742    Specimen:  Blood from Arm, Left Updated:  02/07/19 1846     Extra Tube Hold for add-ons.     Comment: Auto resulted.       Lavender Top [[id number]] Collected:  02/07/19 1742    Specimen:  Blood from Arm, Left Updated:  02/07/19 1846     Extra Tube hold for add-on     Comment: Auto resulted       Red Top [[id number]] Collected:  02/07/19 1742    Specimen:  Blood from Arm, Left Updated:  02/07/19 1846     Extra Tube Hold for add-ons.     Comment: Auto resulted.       Troponin [[id number]]  (Normal) Collected:  02/07/19 1742    Specimen:  Blood from Arm, Left Updated:  02/07/19 1829     Troponin I <0.012 ng/mL     BNP [[id number]]  (Normal) Collected:  02/07/19 1742    Specimen:  Blood from Arm, Left Updated:  02/07/19 1829     proBNP 574.0 pg/mL     Lipase [[id number]]  (Abnormal) Collected:  02/07/19 1742    Specimen:  Blood from Arm, Left Updated:  02/07/19 1815     Lipase 16 U/L     Lactic Acid, Plasma [[id number]]  (Abnormal) Collected:  02/07/19 1742    Specimen:  Blood from Arm, Left Updated:  02/07/19 1811     Lactate 3.0 mmol/L     Comprehensive Metabolic Panel [[id number]]  (Abnormal) Collected:  02/07/19 1742    Specimen:  Blood from Arm, Left Updated:  02/07/19 1809     Glucose 204 mg/dL      BUN 13 mg/dL      Creatinine 1.40 mg/dL      Sodium 135 mmol/L      Potassium 4.0 mmol/L      Chloride 97 mmol/L      CO2 24.0 mmol/L      Calcium 9.2 mg/dL      Total Protein 7.1 g/dL      Albumin 4.50 g/dL      ALT (SGPT) 20 U/L      AST (SGOT) 34 U/L      Alkaline Phosphatase 62 U/L      Total Bilirubin 0.9 mg/dL      eGFR Non African Amer 51 mL/min/1.73      Globulin 2.6 gm/dL      A/G  Ratio 1.7 g/dL      BUN/Creatinine Ratio 9.3     Anion Gap 14.0 mmol/L     CBC & Differential [[id number]] Collected:  02/07/19 1742    Specimen:  Blood Updated:  02/07/19 1754    Narrative:       The following orders were created for panel order CBC & Differential.  Procedure                               Abnormality         Status                     ---------                               -----------         ------                     CBC Auto Differential[[id number]]        Abnormal            Final result                 Please view results for these tests on the individual orders.    CBC Auto Differential [[id number]]  (Abnormal) Collected:  02/07/19 1742    Specimen:  Blood from Arm, Left Updated:  02/07/19 1754     WBC 8.59 10*3/mm3      RBC 4.33 10*6/mm3      Hemoglobin 13.1 g/dL      Hematocrit 38.7 %      MCV 89.4 fL      MCH 30.3 pg      MCHC 33.9 g/dL      RDW 14.4 %      RDW-SD 46.5 fl      MPV 10.6 fL      Platelets 260 10*3/mm3      Neutrophil % 73.7 %      Lymphocyte % 13.3 %      Monocyte % 8.3 %      Eosinophil % 3.8 %      Basophil % 0.6 %      Immature Grans % 0.3 %      Neutrophils, Absolute 6.33 10*3/mm3      Lymphocytes, Absolute 1.14 10*3/mm3      Monocytes, Absolute 0.71 10*3/mm3      Eosinophils, Absolute 0.33 10*3/mm3      Basophils, Absolute 0.05 10*3/mm3      Immature Grans, Absolute 0.03 10*3/mm3      nRBC 0.0 /100 WBC           Radiology:  Imaging Results (last 24 hours)     Procedure Component Value Units Date/Time    CT Abdomen Pelvis Without Contrast [[id number]] Collected:  02/07/19 1942     Updated:  02/07/19 1955    Narrative:       EXAMINATION: CT ABDOMEN PELVIS WO CONTRAST-      2/7/2019 7:11 PM CST     HISTORY: Fall injury. Blunt trauma. Low abdominal pain.     In order to have a CT radiation dose as low as reasonably achievable  Automated Exposure Control was utilized for adjustment of the mA and/or  KV according to patient size.     DLP in mGycm= 1797.     Noncontrast abdomen  pelvis CT compared with 1/9/2019.     Normal heart size.  CABG changes with coronary artery calcification.  No acute abnormality at the lung bases.     Trace amounts of air are noted within the hepatic parenchyma in the  subdiaphragmatic region. This has the appearance of intravenous air and  a few mesenteric veins adjacent to the stomach and transverse colon also  show small amounts of air. There is no colonic wall thickening to  indicate ischemia.     No portal vein or superior mesenteric vein area seen.     There is no free fluid.  No bowel dilation.     No pelvic mass or fluid.  There is moderate fecal material throughout the colon.  No diverticulitis or colitis.     Normal and symmetric adrenal glands and kidneys.  3 cm right renal cyst noted.     Summary:  1. Small amounts of mesenteric venous air within the upper abdomen and  small amounts of venous air within the liver. (Has this patient had  recent endoscopy or colonoscopy?)  2. No fluid, mass, or signs of bowel ischemia.                                   This report was finalized on 02/07/2019 19:51 by Dr. Gomez Mcgill MD.    CT Cervical Spine Without Contrast [[id number]] Collected:  02/07/19 1941     Updated:  02/07/19 1945    Narrative:       EXAMINATION: CT CERVICAL SPINE WO CONTRAST-      2/7/2019 7:11 PM CST     HISTORY: Fall injury. Neck pain.     In order to have a CT radiation dose as low as reasonably achievable  Automated Exposure Control was utilized for adjustment of the mA and/or  KV according to patient size.     DLP in mGycm= 253.     Axial, sagittal, and coronal noncontrast CT imaging.     Vertebral bodies and posterior elements are intact.     Reconstructed sagittal images show normal curvature.   There is no malalignment. Prevertebral soft tissues are normal.   Facet joints align normally.     Reconstructed coronal images show normal lateral mass alignment.     C6-7 discectomy with anterior plate and screw fusion.       Impression:        1. No acute bony abnormality.                                         This report was finalized on 02/07/2019 19:42 by Dr. Gomez Mcgill MD.    CT Head Without Contrast [[id number]] Collected:  02/07/19 1941     Updated:  02/07/19 1944    Narrative:       EXAMINATION: CT HEAD WO CONTRAST-      2/7/2019 7:11 PM CST     HISTORY: fall, head injury     In order to have a CT radiation dose as low as reasonably achievable  Automated Exposure Control was utilized for adjustment of the mA and/or  KV according to patient size.     DLP in mGycm= 818.     Axial, sagittal, and coronal noncontrast CT imaging of the head.     The visualized paranasal sinuses are clear.     The brain and ventricles have an age appropriate appearance.   There is no hemorrhage or mass-effect.   No acute infarction is seen.     No calvarial abnormality.       Impression:       1. No acute intracranial abnormality is seen.                                         This report was finalized on 02/07/2019 19:41 by Dr. Gomez Mcgill MD.    XR Chest 1 View [[id number]] Collected:  02/07/19 1832     Updated:  02/07/19 1835    Narrative:       EXAMINATION: XR CHEST 1 VW-     2/7/2019 6:16 PM CST     HISTORY: Short of breath.     1 view chest x-ray compared with 1/17/2018.     CABG changes.     Heart size is normal.  The mediastinum is within normal limits.      The lungs are normally expanded with no pneumonia or pneumothorax.       No congestive failure changes.                                                                       Impression:       1. No acute disease.        This report was finalized on 02/07/2019 18:32 by Dr. Gomez Mcgill MD.            Assessment/Plan       CHF (congestive heart failure) (CMS/[facility])    CAD (coronary artery disease)    Chest pain, non-cardiac    Obesity, unspecified obesity severity, unspecified obesity type    HTN (hypertension), benign    Type 2 diabetes mellitus with hyperglycemia, with long-term current use of insulin  (CMS/HCC)    Orthostatic hypotension    Abdominal pain    Constipation      1. Abnormal ct abd/pelvis   ( Small amounts of mesenteric venous air within the      upper abdomen and  small amounts of venous air within the liver).   2. Abdominal pain   3. N/v   4.  Obstipation    Long discussion with the patient and his wife.  Discussed with nursing staff.  His abdominal discomfort is suggestive of obstipation.  As discussed though, the mesenteric venous air is worrisome for infarction.  Fortunately, nothing on exam or his other labs suggest infarction of the gut.  His history is not typical for ischemic colitis.    At this time I am in agreement with repeating the CT scan of the abdomen pelvis.  Although IV contrast is favored, this is not ordered secondary to renal insufficiency and concern for worsening renal function.  I favor continue to try to get his bowels moving.  I suggest enemas until we have a better understanding of what is occurring.  I suggest we stop the lactulose as this does cause intestinal gas commonly which can increase his abdominal cramping.  I suggest we substitute MiraLAX instead of the lactulose.  I plan to repeat his lactic acid as he had another episode of discomfort this afternoon.  We will check labs in the morning.  Continue close observation.  I did discuss with the patient and wife that if he continues to have abdominal discomfort and signs of infarction that he will require emergent surgical intervention.  I believe the risk of diagnostic surgical exploration at this point outweigh the benefit they are in agreement.    I discussed the patients findings and my recommendations with patient, family and nursing staff      EMR Dragon/transcription disclaimer:  Much of this encounter note is electronic transcription/translation of spoken language to printed text.  The electronic translation of spoken language may be erroneous, or at times, nonsensical words or phrases may be inadvertently  transcribed.  Although I have reviewed the note for such errors, some may still exist.    Justyna DAVIS 8:40 AM    Tyrell Smith MD  02/08/19  1:52 PM

## 2019-02-08 NOTE — H&P
Patient Care Team:  Del Shetty MD as PCP - General (Family Medicine)  Juan Chua DO as PCP - Claims Attributed  Emeka Garay MD as Cardiologist (Cardiology)  Cristopher Hannah MD as Consulting Physician (Gastroenterology)  Brian Lomas MD as Consulting Physician (Nephrology)    Chief complaint lower abdominal pain  Subjective     Subjective     Erick Luong  is a 63 y.o. male presents with a significant history of having multiple medical problems which include arthritis chronic back pain congestive heart failure coronary artery disease diabetes obesity hypertension history of pancreatitis sleep disorder, he has chronic constipation he takes Linzess 290 mcg and and lactulose on a daily basis he also is on Percocet 10 and has very minimal bowel activity.  He has been followed by cardiology nephrology GI medicine recently he has diagnosis of having autonomic nervous system neuropathy with reduced blood pressure and is on  midrodrine 5 mg twice a day.  He is on this by Dr. Lomas .  His creatinine has improved to 2 his last colonoscopy was 2 years ago by report and unremarkable but he has chronic obstipation constipation.  With this problem he has been working with GI medicine to have bowel movements is also been working with Dr. kenney to increase his blood pressure is on certain medications for this and with his abdominal pain he was admitted a CT scan was obtained showing some gas in his portal system and also in his liver.  The etiology of this is unknown there is absolutely no finding on his CT scan there is no bowel wall thickening there is no sign of any inflammation the only physical finding that I appreciate he has normal bowel sounds he has no peritoneal signs but lower abdominal pain and he has a significant impaction in his colon.  He states that he does not go that frequently and with his Linzess also he needs lactulose to go and intermittently he needs magnesium citrate.  I have  absolutely no reason are no obvious knowledge why he has this portal venous gas but postulating a possibility given no abnormalities on a CT scan other than this gas is quite possibly he has translocation of bacteria from this significant constipation that he has on a regular basis.  He is impacted.  He also states that he is gained 20 pounds over the past week.  The only thing that I can appreciate and suggest would be to treat his medical problems hydrate him and give him a fluid bolus, try to stimulate bowel activity from above without magnesium citrate, and also below with a hog enema.  There is no stool that needs to be broken up but a large amount of stool in his rectum.  We will also repeat his CT scan in the morning to assure no other findings are appreciated and to see if the stool has moved also we will consult infectious disease, hospitalist of [place], his gastroenterologist for evaluation and treatment.    Past surgical history significant for laparoscopic cholecystectomy, open appendectomy, cardiac surgery, cataract extraction cervical spine surgery, colonoscopy, upper and lower endoscopies.    Medical problems are multiple and are listed.    Review of Systems   Pertinent items are noted in HPI, all other systems reviewed and negative    History  Past Medical History:   Diagnosis Date   • Arthritis    • CHF (congestive heart failure) (CMS/HCC)    • Coronary artery disease    • Diabetes mellitus (CMS/HCC)    • Hyperlipidemia    • Hypertension    • Myocardial infarction (CMS/HCC)    • Pancreatitis    • Renal disorder    • Sleep apnea with use of continuous positive airway pressure (CPAP)      Past Surgical History:   Procedure Laterality Date   • ABDOMINAL SURGERY     • APPENDECTOMY     • BACK SURGERY     • CARDIAC SURGERY     • CATARACT EXTRACTION     • CERVICAL SPINE SURGERY     • COLONOSCOPY N/A 1/31/2017    Normal exam repeat in 5 years   • COLONOSCOPY W/ POLYPECTOMY  03/04/2014    Hyperplastic polyp    • CORONARY ARTERY BYPASS GRAFT  10/2015   • ENDOSCOPY  04/13/2011    Gastritis with hemorrhage   • ENDOSCOPY N/A 5/5/2017    Normal exam   • INCISION AND DRAINAGE OF WOUND Left 09/2007    spider bite     Family History   Problem Relation Age of Onset   • Colon cancer Father    • Heart disease Father    • Colon cancer Sister    • Colon polyps Sister    • Alzheimer's disease Mother    • Coronary artery disease Sister    • Coronary artery disease Sister      Social History     Tobacco Use   • Smoking status: Never Smoker   • Smokeless tobacco: Never Used   • Tobacco comment: smoked in [employer]   Substance Use Topics   • Alcohol use: No   • Drug use: No       (Not in a hospital admission)  Allergies:  Bactrim [sulfamethoxazole-trimethoprim]    Problem list  Patient Active Problem List   Diagnosis   • Lower abdominal pain   • SIRS (systemic inflammatory response syndrome) (CMS/[facility])   • Fever, unknown origin   • Viral gastroenteritis   • CHF (congestive heart failure) (CMS/[facility])   • CAD (coronary artery disease)   • Diabetes mellitus (CMS/[facility])   • Hypertension   • Sleep apnea   • Arthritis   • Mixed hyperlipidemia   • Shortness of breath   • Acute pancreatitis   • Chest pain, non-cardiac   • Obesity, unspecified obesity severity, unspecified obesity type   • HTN (hypertension), benign   • Epigastric pain   • Controlled type 2 diabetes mellitus without complication, with long-term current use of insulin (CMS/[facility])   • Pleuritic chest pain   • Slow transit constipation   • Nausea and vomiting   • Chronic kidney disease   • Nonsmoker   • Orthostatic hypotension   • Abdominal pain       Objective      Objective     Vital Signs  Temp:  [97.4 °F (36.3 °C)] 97.4 °F (36.3 °C)  Heart Rate:  [] 85  Resp:  [32] 32  BP: (154-176)/() 176/86    Physical Exam:      General Appearance:    Alert, cooperative, in no acute distress he complains of pain in his lower abdomen he has had multiple previous episodes and admissions  for abdominal pain it appears on his chart he has recurrent episodes of obstipation constipation and is extremely constipated with attempts to treat this with Linzess, also lactulose but is also taking Percocet 10.   Head:    Normocephalic, without obvious abnormality, atraumatic   Eyes:            Lids and lashes normal, conjunctivae and sclerae normal, no   icterus, no pallor, corneas clear, PERRLA   Ears:    Ears appear intact with no abnormalities noted   Throat:   No oral lesions, no thrush, oral mucosa moist   Neck:   No adenopathy, supple, trachea midline, no thyromegaly, no   carotid bruit, no JVD   Back:     No kyphosis present, no scoliosis present, no skin lesions,      erythema or scars, no tenderness to percussion or                   palpation,   range of motion normal   Lungs:     Clear to auscultation,respirations regular, even and                  unlabored    Heart:    Regular rhythm and normal rate, normal S1 and S2, no            murmur, no gallop, no rub, no click   Chest Wall:    No abnormalities observed   Abdomen:    Morbidly obese abdomen, well-healed surgical incision from his cholecystectomy, appendectomy and also his cardiac surgery.  No obvious hernias are noted.  He has occasional bowel sounds, he has tenderness in his lower abdomen mainly below's umbilical area.   Rectal:    Normal sphincter tone, but a excessive amount of stool in his rectum there is no impaction but just significant large amount of stool noted.   Extremities:   Moves all extremities well, no edema, no cyanosis, no             redness   Pulses:   Pulses palpable and equal bilaterally   Skin:   No bleeding, bruising or rash   Lymph nodes:   No palpable adenopathy   Neurologic:   Cranial nerves 2 - 12 grossly intact, sensation intact, DTR       present and equal bilaterally   His white count is 8.5 unchanged from previous evaluations lipase 16, BUN and creatinine of 13 and 1.4, glucose of 204.  Differential has 73  neutrophils 13 lymphs 8 monos.     Blood cultures of course are pending, his CT scan has been evaluated with Dr. Mcgill there is small amounts of mesenteric venous air in the upper abdomen small amounts of venous air in the liver no fluid no mass no signs of any bowel ischemia no sign of any obvious infection.  Results Review:    I reviewed the patient's new imaging results and agree with the interpretation.    Results from last 7 days   Lab Units 02/07/19  1742   WBC 10*3/mm3 8.59   HEMOGLOBIN g/dL 13.1*   HEMATOCRIT % 38.7*   PLATELETS 10*3/mm3 260        Results from last 7 days   Lab Units 02/07/19  1742   SODIUM mmol/L 135   POTASSIUM mmol/L 4.0   CHLORIDE mmol/L 97*   CO2 mmol/L 24.0   BUN mg/dL 13   CREATININE mg/dL 1.40   CALCIUM mg/dL 9.2   BILIRUBIN mg/dL 0.9   ALK PHOS U/L 62   ALT (SGPT) U/L 20   AST (SGOT) U/L 34   GLUCOSE mg/dL 204*       Assessment/Plan     Assessment/Plan       Abdominal pain    Patient with a multitude of medical problems including obesity, autonomic hypotension, chronic back pain, diabetes, sleep apnea, history of pancreatitis, morbid obesity, chronic kidney disease stage III/IV, chronic constipation, gastroparesis, colonic atony.  Now with lower abdominal pain and significant constipation.  There is also some gas in the portal system etiology of this is unknown.  My thoughts with everything being normal on his CT scan except for the gas in the stool could this be translocation of bacteria from this obstipation constipation I will treat him with Merrem we will ask infectious disease nephrology, hospitalist in [place] to see and evaluate as well as gastroenterology.  We will repeat his CT scan try to reduce his bowel distention.  And also begin Merrem for treatment of this.    I discussed the patients findings and my recommendations with patient, family and nursing staff.     Ken Perry MD  02/07/19  9:04 PM    Time:Time spent with the patient 30 minutes     EMR  Dragon/Transcription disclaimer: Much of this encounter note is an electronic transcription/translation of spoken language to printed text. The electronic translation of spoken language may permit erroneous, or at times, nonsensical words or phrases to be inadvertently transcribed; although I have reviewed the note for such errors, some may still exist.

## 2019-02-08 NOTE — PLAN OF CARE
Problem: Patient Care Overview  Goal: Plan of Care Review  Outcome: Ongoing (interventions implemented as appropriate)   02/08/19 0541   Coping/Psychosocial   Plan of Care Reviewed With patient   Plan of Care Review   Progress no change   OTHER   Outcome Summary pt c/o abdominal pain, PRN IV pain medication given as prescribed. Pt c/o N/V, PRN PO/IV zofran given with some relief noted. IV abx maintained. HOG enema given, no results at this time. VSS, safety maintained, NPO status maintained, will continue to monitor.       Problem: Fall Risk (Adult)  Goal: Identify Related Risk Factors and Signs and Symptoms  Outcome: Outcome(s) achieved Date Met: 02/08/19    Goal: Absence of Fall  Outcome: Ongoing (interventions implemented as appropriate)      Problem: Nausea/Vomiting (Adult)  Goal: Identify Related Risk Factors and Signs and Symptoms  Outcome: Outcome(s) achieved Date Met: 02/08/19    Goal: Symptom Relief  Outcome: Ongoing (interventions implemented as appropriate)    Goal: Adequate Hydration  Outcome: Ongoing (interventions implemented as appropriate)      Problem: Constipation (Adult)  Goal: Identify Related Risk Factors and Signs and Symptoms  Outcome: Outcome(s) achieved Date Met: 02/08/19    Goal: Effective Bowel Elimination  Outcome: Ongoing (interventions implemented as appropriate)    Goal: Comfort  Outcome: Ongoing (interventions implemented as appropriate)

## 2019-02-08 NOTE — ED PROVIDER NOTES
"Subjective   History of Present Illness  63-year-old male presents a chief complaint of abdominal pain began at 11 AM.  Patient reports he had been seen here recently for abdominal pain but this feels significantly different.  She describes the pain as sharp and intermittent.  He says his pain is severe.  He reports nausea and vomiting no diarrhea.  He has had multiple issues with constipation.  He was recently discharged and was told that he had significant constipation.  No fevers cough cp soa  Review of Systems   All other systems reviewed and are negative.      Past Medical History:   Diagnosis Date   • Arthritis    • CHF (congestive heart failure) (CMS/[facility])    • Coronary artery disease    • Diabetes mellitus (CMS/HCC)    • Hyperlipidemia    • Hypertension    • Myocardial infarction (CMS/[facility])    • Pancreatitis    • Renal disorder    • Sleep apnea with use of continuous positive airway pressure (CPAP)        Allergies   Allergen Reactions   • Bactrim [Sulfamethoxazole-Trimethoprim] Other (See Comments)     \"RENAL FAILURE\"       Past Surgical History:   Procedure Laterality Date   • ABDOMINAL SURGERY     • APPENDECTOMY     • BACK SURGERY     • CARDIAC SURGERY     • CATARACT EXTRACTION     • CERVICAL SPINE SURGERY     • COLONOSCOPY N/A 1/31/2017    Normal exam repeat in 5 years   • COLONOSCOPY W/ POLYPECTOMY  03/04/2014    Hyperplastic polyp   • CORONARY ARTERY BYPASS GRAFT  10/2015   • ENDOSCOPY  04/13/2011    Gastritis with hemorrhage   • ENDOSCOPY N/A 5/5/2017    Normal exam   • INCISION AND DRAINAGE OF WOUND Left 09/2007    spider bite       Family History   Problem Relation Age of Onset   • Colon cancer Father    • Heart disease Father    • Colon cancer Sister    • Colon polyps Sister    • Alzheimer's disease Mother    • Coronary artery disease Sister    • Coronary artery disease Sister        Social History     Socioeconomic History   • Marital status:      Spouse name: Not on file   • Number of " children: Not on file   • Years of education: Not on file   • Highest education level: Not on file   Tobacco Use   • Smoking status: Never Smoker   • Smokeless tobacco: Never Used   • Tobacco comment: smoked in highschool   Substance and Sexual Activity   • Alcohol use: No   • Drug use: No           Objective   Physical Exam   Constitutional: He is oriented to person, place, and time. He appears well-developed and well-nourished.   HENT:   Head: Normocephalic and atraumatic.   Eyes: EOM are normal. Pupils are equal, round, and reactive to light.   Cardiovascular: Normal rate and regular rhythm.   Pulmonary/Chest: Effort normal and breath sounds normal.   Abdominal: Normal appearance and bowel sounds are normal.   Neurological: He is alert and oriented to person, place, and time.   Skin: Skin is warm and dry. Capillary refill takes less than 2 seconds.   Psychiatric: He has a normal mood and affect. His behavior is normal.   Nursing note and vitals reviewed.      Procedures           ED Course        Labs Reviewed   COMPREHENSIVE METABOLIC PANEL - Abnormal; Notable for the following components:       Result Value    Glucose 204 (*)     Chloride 97 (*)     eGFR Non  Amer 51 (*)     Anion Gap 14.0 (*)     All other components within normal limits   LACTIC ACID, PLASMA - Abnormal; Notable for the following components:    Lactate 3.0 (*)     All other components within normal limits   CBC WITH AUTO DIFFERENTIAL - Abnormal; Notable for the following components:    RBC 4.33 (*)     Hemoglobin 13.1 (*)     Hematocrit 38.7 (*)     Lymphocyte % 13.3 (*)     All other components within normal limits   LIPASE - Abnormal; Notable for the following components:    Lipase 16 (*)     All other components within normal limits   URINALYSIS W/ CULTURE IF INDICATED - Abnormal; Notable for the following components:    Glucose,  mg/dL (Trace) (*)     All other components within normal limits    Narrative:     Urine  microscopic not indicated.   TROPONIN (IN-HOUSE) - Normal   BNP (IN-HOUSE) - Normal   TROPONIN (IN-HOUSE) - Normal   LACTIC ACID, REFLEX - Normal   BLOOD CULTURE   BLOOD CULTURE   RAINBOW DRAW    Narrative:     The following orders were created for panel order [place] Draw.  Procedure                               Abnormality         Status                     ---------                               -----------         ------                     Light Blue Top[[id number]]                                   Final result               Green Top (Gel)[[id number]]                                  Final result               Lavender Top[[id number]]                                     Final result               Red Top[[id number]]                                          Final result                 Please view results for these tests on the individual orders.   LACTIC ACID REFLEX TIMER   AMYLASE   PROTIME-INR   HEMOGLOBIN A1C   LIPASE   MAGNESIUM   COMPREHENSIVE METABOLIC PANEL   CBC WITH AUTO DIFFERENTIAL   POCT GLUCOSE FINGERSTICK   POCT GLUCOSE FINGERSTICK   POCT GLUCOSE FINGERSTICK   POCT GLUCOSE FINGERSTICK   POCT GLUCOSE FINGERSTICK   CBC AND DIFFERENTIAL    Narrative:     The following orders were created for panel order CBC & Differential.  Procedure                               Abnormality         Status                     ---------                               -----------         ------                     CBC Auto Differential[[id number]]        Abnormal            Final result                 Please view results for these tests on the individual orders.   LIGHT BLUE TOP   GREEN TOP   LAVENDER TOP   RED TOP   CBC AND DIFFERENTIAL    Narrative:     The following orders were created for panel order CBC & Differential.  Procedure                               Abnormality         Status                     ---------                               -----------         ------                     CBC Auto  Differential[[id number]]                                                         Please view results for these tests on the individual orders.     CT Abdomen Pelvis Without Contrast   Final Result      CT Cervical Spine Without Contrast   Final Result   1. No acute bony abnormality.                                                       This report was finalized on 02/07/2019 19:42 by Dr. Gomez Mcgill MD.      CT Head Without Contrast   Final Result   1. No acute intracranial abnormality is seen.                                                       This report was finalized on 02/07/2019 19:41 by Dr. Gomez Mcgill MD.      XR Chest 1 View   Final Result   1. No acute disease.           This report was finalized on 02/07/2019 18:32 by Dr. Gomez Mcgill MD.      CT Abdomen Pelvis With Contrast    (Results Pending)               MDM  Number of Diagnoses or Management Options  Diagnosis management comments: Abnormal CT with mesenteric gas, Dr. Perry admitting, hospitalist consulting        Amount and/or Complexity of Data Reviewed  Clinical lab tests: reviewed and ordered  Tests in the radiology section of CPT®: ordered and reviewed  Tests in the medicine section of CPT®: ordered and reviewed  Decide to obtain previous medical records or to obtain history from someone other than the patient: yes    Risk of Complications, Morbidity, and/or Mortality  Presenting problems: moderate  Diagnostic procedures: moderate  Management options: moderate    Patient Progress  Patient progress: stable        Final diagnoses:   Constipation, unspecified constipation type   Lactic acidosis   Abnormal CT of the abdomen            Stewart Richard PA-C  02/08/19 0104

## 2019-02-08 NOTE — PAYOR COMM NOTE
"Erick Luong (63 y.o. Male) [id number]   [facility] phone   Fax     Date of Birth Social Security Number Address Home Phone MRN    [id number]  [id number]  [place] [address] [phone] [id number]    Holiness Marital Status          Presybeterian        Admission Date Admission Type Admitting Provider Attending Provider Department, Room/Bed    2/7/19 Emergency Ken Perry MD Stigall, Kevin E, MD [facility] 3A, 353/1    Discharge Date Discharge Disposition Discharge Destination                       Attending Provider:  Ken Perry MD    Allergies:  Bactrim [Sulfamethoxazole-trimethoprim]    Isolation:  None   Infection:  None   Code Status:  CPR    Ht:  182.9 cm (72\")   Wt:  142 kg (313 lb 8 oz)    Admission Cmt:  None   Principal Problem:  None                Active Insurance as of 2/7/2019     Primary Coverage     Payor Plan Insurance Group Employer/Plan Group    Formerly [facility] BLUE [employer] EM[employer] [id number]     Payor Plan Address Payor Plan Phone Number Payor Plan Fax Number Effective Dates    [address] [phone]  1/1/2018 - None Entered    [facility] [id number]       Subscriber Name Subscriber Birth Date Member ID       ZAINAB LUONG 11/27/1969 [id number]           Secondary Coverage     Payor Plan Insurance Group Employer/Plan Group    MEDICARE MEDICARE A & B      Payor Plan Address Payor Plan Phone Number Payor Plan Fax Number Effective Dates    [address] [phone]  7/1/2013 - None Entered    [facility] [id number]       Subscriber Name Subscriber Birth Date Member ID       ERICK LUONG 1955 [id number]                 Emergency Contacts      (Rel.) Home Phone Work Phone Mobile Phone    Zainab Luong (Spouse) [phone]               History & Physical      Ken Perry MD at 2/7/2019  9:04 PM                Patient Care Team:  Del Shetty " MD EDVIN as PCP - General (Family Medicine)  Juan Chua DO as PCP - Claims Attributed  Emeka Garay MD as Cardiologist (Cardiology)  Cristopher Hannah MD as Consulting Physician (Gastroenterology)  Brian Lomas MD as Consulting Physician (Nephrology)    Chief complaint lower abdominal pain  Subjective     Subjective     Erick Luong  is a 63 y.o. male presents with a significant history of having multiple medical problems which include arthritis chronic back pain congestive heart failure coronary artery disease diabetes obesity hypertension history of pancreatitis sleep disorder, he has chronic constipation he takes Linzess 290 mcg and and lactulose on a daily basis he also is on Percocet 10 and has very minimal bowel activity.  He has been followed by cardiology nephrology GI medicine recently he has diagnosis of having autonomic nervous system neuropathy with reduced blood pressure and is on  midrodrine 5 mg twice a day.  He is on this by Dr. Lomas .  His creatinine has improved to 2 his last colonoscopy was 2 years ago by report and unremarkable but he has chronic obstipation constipation.  With this problem he has been working with GI medicine to have bowel movements is also been working with Dr. kenney to increase his blood pressure is on certain medications for this and with his abdominal pain he was admitted a CT scan was obtained showing some gas in his portal system and also in his liver.  The etiology of this is unknown there is absolutely no finding on his CT scan there is no bowel wall thickening there is no sign of any inflammation the only physical finding that I appreciate he has normal bowel sounds he has no peritoneal signs but lower abdominal pain and he has a significant impaction in his colon.  He states that he does not go that frequently and with his Linzess also he needs lactulose to go and intermittently he needs magnesium citrate.  I have absolutely no reason are no obvious  knowledge why he has this portal venous gas but postulating a possibility given no abnormalities on a CT scan other than this gas is quite possibly he has translocation of bacteria from this significant constipation that he has on a regular basis.  He is impacted.  He also states that he is gained 20 pounds over the past week.  The only thing that I can appreciate and suggest would be to treat his medical problems hydrate him and give him a fluid bolus, try to stimulate bowel activity from above without magnesium citrate, and also below with a hog enema.  There is no stool that needs to be broken up but a large amount of stool in his rectum.  We will also repeat his CT scan in the morning to assure no other findings are appreciated and to see if the stool has moved also we will consult infectious disease, hospitalist of [place], his gastroenterologist for evaluation and treatment.    Past surgical history significant for laparoscopic cholecystectomy, open appendectomy, cardiac surgery, cataract extraction cervical spine surgery, colonoscopy, upper and lower endoscopies.    Medical problems are multiple and are listed.    Review of Systems   Pertinent items are noted in HPI, all other systems reviewed and negative    History  Past Medical History:   Diagnosis Date   • Arthritis    • CHF (congestive heart failure) (CMS/HCC)    • Coronary artery disease    • Diabetes mellitus (CMS/HCC)    • Hyperlipidemia    • Hypertension    • Myocardial infarction (CMS/HCC)    • Pancreatitis    • Renal disorder    • Sleep apnea with use of continuous positive airway pressure (CPAP)      Past Surgical History:   Procedure Laterality Date   • ABDOMINAL SURGERY     • APPENDECTOMY     • BACK SURGERY     • CARDIAC SURGERY     • CATARACT EXTRACTION     • CERVICAL SPINE SURGERY     • COLONOSCOPY N/A 1/31/2017    Normal exam repeat in 5 years   • COLONOSCOPY W/ POLYPECTOMY  03/04/2014    Hyperplastic polyp   • CORONARY ARTERY BYPASS GRAFT   10/2015   • ENDOSCOPY  04/13/2011    Gastritis with hemorrhage   • ENDOSCOPY N/A 5/5/2017    Normal exam   • INCISION AND DRAINAGE OF WOUND Left 09/2007    spider bite     Family History   Problem Relation Age of Onset   • Colon cancer Father    • Heart disease Father    • Colon cancer Sister    • Colon polyps Sister    • Alzheimer's disease Mother    • Coronary artery disease Sister    • Coronary artery disease Sister      Social History     Tobacco Use   • Smoking status: Never Smoker   • Smokeless tobacco: Never Used   • Tobacco comment: [place] [employer]   Substance Use Topics   • Alcohol use: No   • Drug use: No       (Not in a hospital admission)  Allergies:  Bactrim [sulfamethoxazole-trimethoprim]    Problem list  Patient Active Problem List   Diagnosis   • Lower abdominal pain   • SIRS (systemic inflammatory response syndrome) (CMS/[facility])   • Fever, unknown origin   • Viral gastroenteritis   • CHF (congestive heart failure) (CMS/[facility])   • CAD (coronary artery disease)   • Diabetes mellitus (CMS/[facility])   • Hypertension   • Sleep apnea   • Arthritis   • Mixed hyperlipidemia   • Shortness of breath   • Acute pancreatitis   • Chest pain, non-cardiac   • Obesity, unspecified obesity severity, unspecified obesity type   • HTN (hypertension), benign   • Epigastric pain   • Controlled type 2 diabetes mellitus without complication, with long-term current use of insulin (CMS/[facility])   • Pleuritic chest pain   • Slow transit constipation   • Nausea and vomiting   • Chronic kidney disease   • Nonsmoker   • Orthostatic hypotension   • Abdominal pain       Objective      Objective     Vital Signs  Temp:  [97.4 °F (36.3 °C)] 97.4 °F (36.3 °C)  Heart Rate:  [] 85  Resp:  [32] 32  BP: (154-176)/() 176/86    Physical Exam:      General Appearance:    Alert, cooperative, in no acute distress he complains of pain in his lower abdomen he has had multiple previous episodes and admissions for abdominal pain it appears on  his chart he has recurrent episodes of obstipation constipation and is extremely constipated with attempts to treat this with Linzess, also lactulose but is also taking Percocet 10.   Head:    Normocephalic, without obvious abnormality, atraumatic   Eyes:            Lids and lashes normal, conjunctivae and sclerae normal, no   icterus, no pallor, corneas clear, PERRLA   Ears:    Ears appear intact with no abnormalities noted   Throat:   No oral lesions, no thrush, oral mucosa moist   Neck:   No adenopathy, supple, trachea midline, no thyromegaly, no   carotid bruit, no JVD   Back:     No kyphosis present, no scoliosis present, no skin lesions,      erythema or scars, no tenderness to percussion or                   palpation,   range of motion normal   Lungs:     Clear to auscultation,respirations regular, even and                  unlabored    Heart:    Regular rhythm and normal rate, normal S1 and S2, no            murmur, no gallop, no rub, no click   Chest Wall:    No abnormalities observed   Abdomen:    Morbidly obese abdomen, well-healed surgical incision from his cholecystectomy, appendectomy and also his cardiac surgery.  No obvious hernias are noted.  He has occasional bowel sounds, he has tenderness in his lower abdomen mainly below's umbilical area.   Rectal:    Normal sphincter tone, but a excessive amount of stool in his rectum there is no impaction but just significant large amount of stool noted.   Extremities:   Moves all extremities well, no edema, no cyanosis, no             redness   Pulses:   Pulses palpable and equal bilaterally   Skin:   No bleeding, bruising or rash   Lymph nodes:   No palpable adenopathy   Neurologic:   Cranial nerves 2 - 12 grossly intact, sensation intact, DTR       present and equal bilaterally   His white count is 8.5 unchanged from previous evaluations lipase 16, BUN and creatinine of 13 and 1.4, glucose of 204.  Differential has 73 neutrophils 13 lymphs 8 monos.      Blood cultures of course are pending, his CT scan has been evaluated with Dr. Mcgill there is small amounts of mesenteric venous air in the upper abdomen small amounts of venous air in the liver no fluid no mass no signs of any bowel ischemia no sign of any obvious infection.  Results Review:    I reviewed the patient's new imaging results and agree with the interpretation.    Results from last 7 days   Lab Units 02/07/19  1742   WBC 10*3/mm3 8.59   HEMOGLOBIN g/dL 13.1*   HEMATOCRIT % 38.7*   PLATELETS 10*3/mm3 260        Results from last 7 days   Lab Units 02/07/19  1742   SODIUM mmol/L 135   POTASSIUM mmol/L 4.0   CHLORIDE mmol/L 97*   CO2 mmol/L 24.0   BUN mg/dL 13   CREATININE mg/dL 1.40   CALCIUM mg/dL 9.2   BILIRUBIN mg/dL 0.9   ALK PHOS U/L 62   ALT (SGPT) U/L 20   AST (SGOT) U/L 34   GLUCOSE mg/dL 204*       Assessment/Plan     Assessment/Plan       Abdominal pain    Patient with a multitude of medical problems including obesity, autonomic hypotension, chronic back pain, diabetes, sleep apnea, history of pancreatitis, morbid obesity, chronic kidney disease stage III/IV, chronic constipation, gastroparesis, colonic atony.  Now with lower abdominal pain and significant constipation.  There is also some gas in the portal system etiology of this is unknown.  My thoughts with everything being normal on his CT scan except for the gas in the stool could this be translocation of bacteria from this obstipation constipation I will treat him with Merrem we will ask infectious disease nephrology, hospitalist in [place] to see and evaluate as well as gastroenterology.  We will repeat his CT scan try to reduce his bowel distention.  And also begin Merrem for treatment of this.    I discussed the patients findings and my recommendations with patient, family and nursing staff.     Ken Perry MD  02/07/19  9:04 PM    Time:Time spent with the patient 30 minutes     EMR Dragon/Transcription disclaimer: Much of this  "encounter note is an electronic transcription/translation of spoken language to printed text. The electronic translation of spoken language may permit erroneous, or at times, nonsensical words or phrases to be inadvertently transcribed; although I have reviewed the note for such errors, some may still exist.    Electronically signed by Ken Perry MD at 2/7/2019  9:18 PM          Emergency Department Notes      Stewart Richard PA-C at 2/7/2019  8:54 PM     Attestation signed by Edgar El MD at 2/8/2019  3:59 AM          For this patient encounter, I reviewed the NP or PA documentation, treatment plan, and medical decision making. Edgar El MD 2/8/2019 3:59 AM                  Subjective   History of Present Illness  63-year-old male presents a chief complaint of abdominal pain began at 11 AM.  Patient reports he had been seen here recently for abdominal pain but this feels significantly different.  She describes the pain as sharp and intermittent.  He says his pain is severe.  He reports nausea and vomiting no diarrhea.  He has had multiple issues with constipation.  He was recently discharged and was told that he had significant constipation.  No fevers cough cp soa  Review of Systems   All other systems reviewed and are negative.      Past Medical History:   Diagnosis Date   • Arthritis    • CHF (congestive heart failure) (CMS/HCC)    • Coronary artery disease    • Diabetes mellitus (CMS/HCC)    • Hyperlipidemia    • Hypertension    • Myocardial infarction (CMS/HCC)    • Pancreatitis    • Renal disorder    • Sleep apnea with use of continuous positive airway pressure (CPAP)        Allergies   Allergen Reactions   • Bactrim [Sulfamethoxazole-Trimethoprim] Other (See Comments)     \"RENAL FAILURE\"       Past Surgical History:   Procedure Laterality Date   • ABDOMINAL SURGERY     • APPENDECTOMY     • BACK SURGERY     • CARDIAC SURGERY     • CATARACT EXTRACTION     • CERVICAL SPINE SURGERY "     • COLONOSCOPY N/A 1/31/2017    Normal exam repeat in 5 years   • COLONOSCOPY W/ POLYPECTOMY  03/04/2014    Hyperplastic polyp   • CORONARY ARTERY BYPASS GRAFT  10/2015   • ENDOSCOPY  04/13/2011    Gastritis with hemorrhage   • ENDOSCOPY N/A 5/5/2017    Normal exam   • INCISION AND DRAINAGE OF WOUND Left 09/2007    spider bite       Family History   Problem Relation Age of Onset   • Colon cancer Father    • Heart disease Father    • Colon cancer Sister    • Colon polyps Sister    • Alzheimer's disease Mother    • Coronary artery disease Sister    • Coronary artery disease Sister        Social History     Socioeconomic History   • Marital status:      Spouse name: Not on file   • Number of children: Not on file   • Years of education: Not on file   • Highest education level: Not on file   Tobacco Use   • Smoking status: Never Smoker   • Smokeless tobacco: Never Used   • Tobacco comment: smoked in highschool   Substance and Sexual Activity   • Alcohol use: No   • Drug use: No           Objective   Physical Exam   Constitutional: He is oriented to person, place, and time. He appears well-developed and well-nourished.   HENT:   Head: Normocephalic and atraumatic.   Eyes: EOM are normal. Pupils are equal, round, and reactive to light.   Cardiovascular: Normal rate and regular rhythm.   Pulmonary/Chest: Effort normal and breath sounds normal.   Abdominal: Normal appearance and bowel sounds are normal.   Neurological: He is alert and oriented to person, place, and time.   Skin: Skin is warm and dry. Capillary refill takes less than 2 seconds.   Psychiatric: He has a normal mood and affect. His behavior is normal.   Nursing note and vitals reviewed.      Procedures          ED Course        Labs Reviewed   COMPREHENSIVE METABOLIC PANEL - Abnormal; Notable for the following components:       Result Value    Glucose 204 (*)     Chloride 97 (*)     eGFR Non  Amer 51 (*)     Anion Gap 14.0 (*)     All other  components within normal limits   LACTIC ACID, PLASMA - Abnormal; Notable for the following components:    Lactate 3.0 (*)     All other components within normal limits   CBC WITH AUTO DIFFERENTIAL - Abnormal; Notable for the following components:    RBC 4.33 (*)     Hemoglobin 13.1 (*)     Hematocrit 38.7 (*)     Lymphocyte % 13.3 (*)     All other components within normal limits   LIPASE - Abnormal; Notable for the following components:    Lipase 16 (*)     All other components within normal limits   URINALYSIS W/ CULTURE IF INDICATED - Abnormal; Notable for the following components:    Glucose,  mg/dL (Trace) (*)     All other components within normal limits    Narrative:     Urine microscopic not indicated.   TROPONIN (IN-HOUSE) - Normal   BNP (IN-HOUSE) - Normal   TROPONIN (IN-HOUSE) - Normal   LACTIC ACID, REFLEX - Normal   BLOOD CULTURE   BLOOD CULTURE   RAINBOW DRAW    Narrative:     The following orders were created for panel order [place] Draw.  Procedure                               Abnormality         Status                     ---------                               -----------         ------                     Light Blue Top[[id number]]                                   Final result               Green Top (Gel)[[id number]]                                  Final result               Lavender Top[[id number]]                                     Final result               Red Top[[id number]]                                          Final result                 Please view results for these tests on the individual orders.   LACTIC ACID REFLEX TIMER   AMYLASE   PROTIME-INR   HEMOGLOBIN A1C   LIPASE   MAGNESIUM   COMPREHENSIVE METABOLIC PANEL   CBC WITH AUTO DIFFERENTIAL   POCT GLUCOSE FINGERSTICK   POCT GLUCOSE FINGERSTICK   POCT GLUCOSE FINGERSTICK   POCT GLUCOSE FINGERSTICK   POCT GLUCOSE FINGERSTICK   CBC AND DIFFERENTIAL    Narrative:     The following orders were created for panel order CBC &  Differential.  Procedure                               Abnormality         Status                     ---------                               -----------         ------                     CBC Auto Differential[[id number]]        Abnormal            Final result                 Please view results for these tests on the individual orders.   LIGHT BLUE TOP   GREEN TOP   LAVENDER TOP   RED TOP   CBC AND DIFFERENTIAL    Narrative:     The following orders were created for panel order CBC & Differential.  Procedure                               Abnormality         Status                     ---------                               -----------         ------                     CBC Auto Differential[[id number]]                                                         Please view results for these tests on the individual orders.     CT Abdomen Pelvis Without Contrast   Final Result      CT Cervical Spine Without Contrast   Final Result   1. No acute bony abnormality.                                                       This report was finalized on 02/07/2019 19:42 by Dr. Gomez Mcgill MD.      CT Head Without Contrast   Final Result   1. No acute intracranial abnormality is seen.                                                       This report was finalized on 02/07/2019 19:41 by Dr. Gomez Mcgill MD.      XR Chest 1 View   Final Result   1. No acute disease.           This report was finalized on 02/07/2019 18:32 by Dr. Gomez Mcgill MD.      CT Abdomen Pelvis With Contrast    (Results Pending)               MDM  Number of Diagnoses or Management Options  Diagnosis management comments: Abnormal CT with mesenteric gas, Dr. Perry admitting, hospitalist consulting        Amount and/or Complexity of Data Reviewed  Clinical lab tests: reviewed and ordered  Tests in the radiology section of CPT®:  ordered and reviewed  Tests in the medicine section of CPT®:  ordered and reviewed  Decide to obtain previous medical records or to  obtain history from someone other than the patient: yes    Risk of Complications, Morbidity, and/or Mortality  Presenting problems: moderate  Diagnostic procedures: moderate  Management options: moderate    Patient Progress  Patient progress: stable        Final diagnoses:   Constipation, unspecified constipation type   Lactic acidosis   Abnormal CT of the abdomen            Stewart Richard PA-C  02/08/19 0104      Electronically signed by Edgar El MD at 2/8/2019  3:59 AM       Hospital Medications (active)       Dose Frequency Start End    allopurinol (ZYLOPRIM) tablet 100 mg 100 mg Daily 2/8/2019     Sig - Route: Take 1 tablet by mouth Daily. - Oral    amitriptyline (ELAVIL) tablet 50 mg 50 mg Nightly 2/7/2019     Sig - Route: Take 2 tablets by mouth Every Night. - Oral    dextrose (D50W) 25 g/ 50mL Intravenous Solution 25 g 25 g Every 15 Minutes PRN 2/7/2019     Sig - Route: Infuse 50 mL into a venous catheter Every 15 (Fifteen) Minutes As Needed for Low Blood Sugar (Blood Sugar Less Than 70). - Intravenous    dextrose (GLUTOSE) oral gel 15 g 15 g Every 15 Minutes PRN 2/7/2019     Sig - Route: Take 15 g by mouth Every 15 (Fifteen) Minutes As Needed for Low Blood Sugar (Blood sugar less than 70). - Oral    enoxaparin (LOVENOX) syringe 30 mg 30 mg Every 12 Hours Scheduled 2/7/2019     Sig - Route: Inject 0.3 mL under the skin into the appropriate area as directed Every 12 (Twelve) Hours. - Subcutaneous    escitalopram (LEXAPRO) tablet 10 mg 10 mg Daily 2/8/2019     Sig - Route: Take 1 tablet by mouth Daily. - Oral    glucagon (human recombinant) (GLUCAGEN DIAGNOSTIC) injection 1 mg 1 mg As Needed 2/7/2019     Sig - Route: Inject 1 mg under the skin into the appropriate area as directed As Needed (Blood Glucose Less Than 70). - Subcutaneous    HOG enema 360 mL 360 mL Once 2/7/2019 2/7/2019    Sig - Route: Insert 360 mL into the rectum 1 (One) Time. - Rectal    HOG enema 360 mL 360 mL Every 8 Hours  2/8/2019 2/9/2019    Sig - Route: Insert 360 mL into the rectum Every 8 (Eight) Hours. - Rectal    Cosign for Ordering: Accepted by Ken Perry MD on 2/8/2019  7:26 AM    insulin lispro (humaLOG) injection 0-24 Units 0-24 Units 4 Times Daily With Meals & Nightly 2/8/2019     Sig - Route: Inject 0-24 Units under the skin into the appropriate area as directed 4 (Four) Times a Day With Meals & at Bedtime. - Subcutaneous    iohexol (OMNIPAQUE) 300 MG/ML oral solution 50 mL 50 mL Once As Needed 2/8/2019 2/8/2019    Sig - Route: Take 50 mL by mouth 1 (One) Time As Needed for Contrast. - Oral    Cosign for Ordering: Accepted by Ken Perry MD on 2/8/2019  7:26 AM    isosorbide mononitrate (IMDUR) 24 hr tablet 30 mg 30 mg Daily 2/8/2019     Sig - Route: Take 1 tablet by mouth Daily. - Oral    lactated ringers bolus 1,000 mL 1,000 mL Once 2/7/2019 2/8/2019    Sig - Route: Infuse 1,000 mL into a venous catheter 1 (One) Time. - Intravenous    lactated ringers bolus 1,000 mL 1,000 mL Once 2/8/2019     Sig - Route: Infuse 1,000 mL into a venous catheter 1 (One) Time. - Intravenous    lactulose solution 20 g 20 g 2 Times Daily 2/8/2019     Sig - Route: Take 30 mL by mouth 2 (Two) Times a Day. - Oral    magnesium citrate solution 296 mL 296 mL Once 2/7/2019 2/7/2019    Sig - Route: Take 296 mL by mouth 1 (One) Time. - Oral    meperidine (DEMEROL) injection 25 mg 25 mg Once 2/7/2019 2/7/2019    Sig - Route: Infuse 1 mL into a venous catheter 1 (One) Time. - Intravenous    meropenem (MERREM) 1 g/100 mL 0.9% NS VTB (mbp) 1 g Once 2/7/2019 2/7/2019    Sig - Route: Infuse 100 mL into a venous catheter 1 (One) Time. - Intravenous    meropenem (MERREM) 2 g in sodium chloride 0.9 % 100 mL IVPB 2 g Every 8 Hours 2/8/2019 2/15/2019    Sig - Route: Infuse 2 g into a venous catheter Every 8 (Eight) Hours. - Intravenous    metOLazone (ZAROXOLYN) tablet 2.5 mg 2.5 mg Daily 2/8/2019     Sig - Route: Take 1 tablet by mouth Daily. -  Oral    midodrine (PROAMATINE) tablet 10 mg 10 mg 3 Times Daily With Meals 2/8/2019     Sig - Route: Take 1 tablet by mouth 3 (Three) Times a Day With Meals. - Oral    morphine injection 4 mg 4 mg Once 2/7/2019 2/7/2019    Sig - Route: Infuse 1 mL into a venous catheter 1 (One) Time. - Intravenous    Morphine sulfate (PF) injection 4 mg 4 mg Every 2 Hours PRN 2/7/2019 2/17/2019    Sig - Route: Infuse 1 mL into a venous catheter Every 2 (Two) Hours As Needed for Severe Pain . - Intravenous    nebivolol (BYSTOLIC) tablet 5 mg 5 mg Daily 2/8/2019     Sig - Route: Take 1 tablet by mouth Daily. - Oral    ondansetron (ZOFRAN) 8 mg/50 mL NS IVPB 8 mg Every 6 Hours PRN 2/7/2019     Sig - Route: Infuse 50 mL into a venous catheter Every 6 (Six) Hours As Needed for Nausea or Vomiting. - Intravenous    ondansetron (ZOFRAN) injection 4 mg 4 mg Once 2/7/2019 2/7/2019    Sig - Route: Infuse 2 mL into a venous catheter 1 (One) Time. - Intravenous    ondansetron (ZOFRAN) injection 4 mg 4 mg Once 2/7/2019 2/7/2019    Sig - Route: Infuse 2 mL into a venous catheter 1 (One) Time. - Intravenous    ondansetron ODT (ZOFRAN-ODT) disintegrating tablet 8 mg 8 mg Every 6 Hours PRN 2/7/2019     Sig - Route: Take 1 tablet by mouth Every 6 (Six) Hours As Needed for Nausea or Vomiting. - Oral    pantoprazole (PROTONIX) EC tablet 40 mg 40 mg Daily 2/8/2019     Sig - Route: Take 1 tablet by mouth Daily. - Oral    rosuvastatin (CRESTOR) tablet 40 mg 40 mg Daily 2/8/2019     Sig - Route: Take 2 tablets by mouth Daily. - Oral    sodium chloride 0.9 % flush 1-10 mL 1-10 mL As Needed 2/7/2019     Sig - Route: Infuse 1-10 mL into a venous catheter As Needed for Line Care. - Intravenous    sodium chloride 0.9 % flush 10 mL 10 mL As Needed 2/7/2019     Sig - Route: Infuse 10 mL into a venous catheter As Needed for Line Care. - Intravenous    Cosign for Ordering: Accepted by Ken Perry MD on 2/8/2019  7:26 AM    sodium chloride 0.9 % flush 3 mL 3 mL  Every 12 Hours Scheduled 2/7/2019     Sig - Route: Infuse 3 mL into a venous catheter Every 12 (Twelve) Hours. - Intravenous    HOG enema 360 mL (Discontinued) 360 mL Every 8 Hours 2/9/2019 2/8/2019    Sig - Route: Insert 360 mL into the rectum Every 8 (Eight) Hours. - Rectal    Cosign for Ordering: Accepted by Ken Perry MD on 2/8/2019  7:26 AM    Morphine sulfate (PF) injection 2 mg (Discontinued) 2 mg Every 2 Hours PRN 2/7/2019 2/8/2019    Sig - Route: Infuse 1 mL into a venous catheter Every 2 (Two) Hours As Needed for Severe Pain . - Intravenous             Physician Progress Notes (last 24 hours) (Notes from 2/7/2019  9:52 AM through 2/8/2019  9:52 AM)      Ken Perry MD at 2/8/2019  8:26 AM               LOS: 1 day   Patient Care Team:  Del Shetty MD as PCP - General (Family Medicine)  Juan Chua DO as PCP - Claims Attributed  Emeka Garay MD as Cardiologist (Cardiology)  Cristopher Hannah MD as Consulting Physician (Gastroenterology)  Brian Lomas MD as Consulting Physician (Nephrology)    Chief Complaint: Abdominal pain  Subjective     Subjective     He has been hospitalized for the past 12 hours there is no FELY's for him including stool output, he has had 2 enemas, he states he has not had any results, he feels better, but this patient with renal dysfunction also with multiple enemas has no I& os?!.  Currently though he does feel better he is passing flatus no nausea with the mag citrate and the enemas he reports no output rectal exam though shows the large amount of stool has been evacuated.  Objective      Objective     Vital Signs  Temp:  [97.4 °F (36.3 °C)-98.5 °F (36.9 °C)] 98.1 °F (36.7 °C)  Heart Rate:  [] 83  Resp:  [17-32] 18  BP: (115-176)/() 135/51    Intake & Output (last 3 days)     None          Physical Exam:     General Appearance:    Alert, cooperative, in no acute distress   Lungs:     Clear to auscultation,respirations regular, even and                   unlabored    Heart:    Regular rhythm and normal rate, normal S1 and S2, no            murmur, no gallop, no rub, no click   Chest Wall:    No abnormalities observed   Abdomen:    Obese abdomen, normal bowel sounds, slight tenderness on the left upper abdomen, but the diffuse abdominal pain he had last night seems to be improved, once again bowel sounds continue to be normal as they were last night, rectal exam shows the large amount of stool present last night has been evacuated.  Some gas was expressed.  But no large amount of stool noted by patient.    White count is unchanged at 8.4, hematocrit 36 electrolytes unchanged BUN and creatinine of 16 and 0.41, lactate was reduced initially from 3 to currently 1.9.  Liver functions are normal   Currently plan to check a repeat CT scan this morning to assure no progression although he seems better, we will try lactulose for stimulation of bowel function,     Results Review:     I reviewed the patient's new clinical results.  I reviewed the patient's new imaging results and agree with the interpretation.    Results from last 7 days   Lab Units 02/08/19  0631 02/07/19  1742   WBC 10*3/mm3 8.41 8.59   HEMOGLOBIN g/dL 12.3* 13.1*   HEMATOCRIT % 36.4* 38.7*   PLATELETS 10*3/mm3 246 260        Results from last 7 days   Lab Units 02/08/19  0631 02/07/19  1742   SODIUM mmol/L 136 135   POTASSIUM mmol/L 4.2 4.0   CHLORIDE mmol/L 97* 97*   CO2 mmol/L 29.0 24.0   BUN mg/dL 16 13   CREATININE mg/dL 1.41* 1.40   CALCIUM mg/dL 8.7 9.2   BILIRUBIN mg/dL 0.9 0.9   ALK PHOS U/L 59 62   ALT (SGPT) U/L 21 20   AST (SGOT) U/L 29 34   GLUCOSE mg/dL 240* 204*       Assessment/Plan     Assessment/Plan       CHF (congestive heart failure) (CMS/[facility])    CAD (coronary artery disease)    Chest pain, non-cardiac    Obesity, unspecified obesity severity, unspecified obesity type    HTN (hypertension), benign    Type 2 diabetes mellitus with hyperglycemia, with long-term current use  of insulin (CMS/HCC)    Orthostatic hypotension    Abdominal pain    Constipation      Patient with the above medical problems admitted last night with some portal gas etiology is unclear CT scans were normal except for this gas and a large amount of stool.  My only thought was potential translocation from of the bacteria in the colon from chronic obstipation constipation he is chronically on lactulose and Linzess to stimulate bowel function.  And that would be my fastest horse  to bet on.  Follow-up later today, stimulate again with lactulose this seems to work well for him, also repeat a CT scan.  Infectious disease to render an opinion as well on the portal gas.      Ken Perry MD  02/08/19  8:27 AM      Time: time spent with patient 15 minutes     EMR Dragon/Transcription disclaimer: Much of this encounter note is an electronic transcription/translation of spoken language to printed text. The electronic translation of spoken language may permit erroneous, or at times, nonsensical words or phrases to be inadvertently transcribed; although I have reviewed the note for such errors, some may still exist.    Electronically signed by Ken Perry MD at 2/8/2019  8:32 AM          Consult Notes (last 24 hours) (Notes from 2/7/2019  9:52 AM through 2/8/2019  9:52 AM)      Justyna Barry APRN at 2/8/2019  8:39 AM      Consult Orders    1. Inpatient Gastroenterology Consult [[id number]] ordered by Ken Perry MD at 02/07/19 2057                [facility]  Initial Inpatient Consult Note    Referring Provider: Ken Perry MD    Reason for Consultation: Portal venous gas etiology unknown, gastroparesis, colonic atony    Subjective     History of present illness:        63-year-old male admitted with abdominal pain  and abdominal CT scan the abdomen pelvis    He has history of chronic constipation and takes Linzess 290 mcg and lactulose on a daily basis.  He does take narcotics as  well.  He is followed by Dr. Hannah.  Last office visit was January 3, 2019 at that time he was doing much better in regards to constipation.  States that he has been moving his bowels a little bit every day.  Last evening he started with acute lower abdominal pain.  States he has never had pain like this before.  Did have some diaphoresis as well as chills.  CT scan abdomen and pelvis without IV contrast this admission shows small amounts of mesenteric venous air within the upper abdomen and small amount of venous air within the liver.  He also had a CT scan of the abdomen and pelvis January 9, 2019 for left-sided abdominal pain which showed nothing acute.    He has had intermittent nausea and vomiting over the last 2 weeks.  Has also had syncopal episodes over the last several weeks.  He was recently diagnosed with autonomic nervous system neuropathy and reduced blood pressure and is on midrodrine.  He is being followed by Dr. Lomas.    He has not had any rectal bleeding.  No black or bright red blood per rectum.  He take Protonix daily.  No hematemesis.  His gallbladder is gone.  No history of recent surgeries or procedures such as ERCP.  He denies vascular disease.  LFTs are normal,    Lipase 16, lactate was 3.0, white blood cell count is normal, BUN normal, creatinine 1.40, blood cultures have been ordered as well.    Last upper endoscopy was May 2017 by Dr. Hannah for midepigastric pain and that was normal.  Last colonoscopy was January 2017 for generalized abdominal pain and that was normal as well.    Infectious disease as well.  He is n.p.o. and for repeat CAT scan of the abdomen and pelvis today.  He is currently on Merrem.  Hog enemas are also ordered.    Past Medical History:  Past Medical History:   Diagnosis Date   • Arthritis    • CHF (congestive heart failure) (CMS/HCC)    • Coronary artery disease    • Diabetes mellitus (CMS/HCC)    • Hyperlipidemia    • Hypertension    • Myocardial infarction  (CMS/HCC)    • Pancreatitis    • Renal disorder    • Sleep apnea with use of continuous positive airway pressure (CPAP)        Past Surgical History:  Past Surgical History:   Procedure Laterality Date   • ABDOMINAL SURGERY     • APPENDECTOMY     • BACK SURGERY     • CARDIAC SURGERY     • CATARACT EXTRACTION     • CERVICAL SPINE SURGERY     • COLONOSCOPY N/A 1/31/2017    Normal exam repeat in 5 years   • COLONOSCOPY W/ POLYPECTOMY  03/04/2014    Hyperplastic polyp   • CORONARY ARTERY BYPASS GRAFT  10/2015   • ENDOSCOPY  04/13/2011    Gastritis with hemorrhage   • ENDOSCOPY N/A 5/5/2017    Normal exam   • INCISION AND DRAINAGE OF WOUND Left 09/2007    spider bite        Social History:   Social History     Tobacco Use   • Smoking status: Never Smoker   • Smokeless tobacco: Never Used   • [place]   Substance Use Topics   • Alcohol use: No        Family History:  Family History   Problem Relation Age of Onset   • Colon cancer Father    • Heart disease Father    • Colon cancer Sister    • Colon polyps Sister    • Alzheimer's disease Mother    • Coronary artery disease Sister    • Coronary artery disease Sister        Home Meds:  Medications Prior to Admission   Medication Sig Dispense Refill Last Dose   • bumetanide (BUMEX) 2 MG tablet Take 2 mg by mouth Daily.      • allopurinol (ZYLOPRIM) 100 MG tablet Take 100 mg by mouth Daily.   Taking   • amitriptyline (ELAVIL) 50 MG tablet Take 50 mg by mouth Every Night.   Taking   • aspirin 81 MG EC tablet Take 81 mg by mouth Daily.   Taking   • escitalopram (LEXAPRO) 10 MG tablet Take 10 mg by mouth Daily.   Taking   • insulin regular (humuLIN R) 500 UNIT/ML CONCENTRATED injection Inject 100 Units under the skin 3 (Three) Times a Day Before Meals. Packaging states 100 units with regular meals; 120 units with large meals or 360 units daily   Taking   • isosorbide mononitrate (IMDUR) 30 MG 24 hr tablet Take 30 mg by mouth Daily.   Taking   •  linaclotide (LINZESS) 290 MCG capsule capsule Take 290 mcg by mouth Every Morning Before Breakfast.   Taking   • metOLazone (ZAROXOLYN) 2.5 MG tablet Take 2.5 mg by mouth Daily.   Taking   • midodrine (PROAMATINE) 5 MG tablet Take 10 mg by mouth 3 (Three) Times a Day.  0 Taking   • nebivolol (BYSTOLIC) 5 MG tablet Take 5 mg by mouth Daily.   Taking   • ondansetron ODT (ZOFRAN-ODT) 4 MG disintegrating tablet Take 4 mg by mouth 4 (Four) Times a Day As Needed for Nausea or Vomiting.   Taking   • oxyCODONE-acetaminophen (PERCOCET)  MG per tablet Take 1 tablet by mouth 2 (Two) Times a Day With Meals.   Taking   • pantoprazole (PROTONIX) 40 MG EC tablet Take 1 tablet by mouth Daily. 90 tablet 3 Taking   • polyethylene glycol (GoLYTELY) 236 g solution Take as directed by office instructions. 4000 mL 0 Taking   • rosuvastatin (CRESTOR) 40 MG tablet Take 40 mg by mouth Daily.   Taking       Current Meds:  Current Facility-Administered Medications   Medication Dose Route Frequency Provider Last Rate Last Dose   • allopurinol (ZYLOPRIM) tablet 100 mg  100 mg Oral Daily Raquel Duncan APRN       • amitriptyline (ELAVIL) tablet 50 mg  50 mg Oral Nightly Raquel Duncan APRN       • dextrose (D50W) 25 g/ 50mL Intravenous Solution 25 g  25 g Intravenous Q15 Min PRN Raquel Duncan APRN       • dextrose (GLUTOSE) oral gel 15 g  15 g Oral Q15 Min PRN Raquel Duncan APRN       • enoxaparin (LOVENOX) syringe 30 mg  30 mg Subcutaneous Q12H Ken Perry MD   30 mg at 02/08/19 0015   • escitalopram (LEXAPRO) tablet 10 mg  10 mg Oral Daily Raquel Duncan APRN       • glucagon (human recombinant) (GLUCAGEN DIAGNOSTIC) injection 1 mg  1 mg Subcutaneous PRN Raquel Duncan APRN       • HOG enema 360 mL  360 mL Rectal Q8H Ken Perry MD       • insulin lispro (humaLOG) injection 0-24 Units  0-24 Units Subcutaneous 4x Daily With Meals & Nightly Raquel Duncan APRN       • isosorbide mononitrate (IMDUR)  "24 hr tablet 30 mg  30 mg Oral Daily Raquel Duncan, SUSAN       • lactulose solution 20 g  20 g Oral BID Ken Perry MD       • meropenem (MERREM) 2 g in sodium chloride 0.9 % 100 mL IVPB  2 g Intravenous Q8H Ken Perry MD   2 g at 02/08/19 0441   • metOLazone (ZAROXOLYN) tablet 2.5 mg  2.5 mg Oral Daily Raquel Duncan APRN       • midodrine (PROAMATINE) tablet 10 mg  10 mg Oral TID With Meals Raquel Duncan APRN       • Morphine sulfate (PF) injection 4 mg  4 mg Intravenous Q2H PRN Ken Perry MD   4 mg at 02/08/19 0235   • nebivolol (BYSTOLIC) tablet 5 mg  5 mg Oral Daily Raquel Duncan APRN       • ondansetron (ZOFRAN) 8 mg/50 mL NS IVPB  8 mg Intravenous Q6H PRN Ken Perry MD   8 mg at 02/07/19 2327   • ondansetron ODT (ZOFRAN-ODT) disintegrating tablet 8 mg  8 mg Oral Q6H PRN Ken Perry MD   8 mg at 02/08/19 0449   • pantoprazole (PROTONIX) EC tablet 40 mg  40 mg Oral Daily Raquel Duncan APRN       • rosuvastatin (CRESTOR) tablet 40 mg  40 mg Oral Daily Raquel Duncan APRN       • sodium chloride 0.9 % flush 1-10 mL  1-10 mL Intravenous PRN Ken Perry MD       • sodium chloride 0.9 % flush 10 mL  10 mL Intravenous PRN Ken Perry MD       • sodium chloride 0.9 % flush 3 mL  3 mL Intravenous Q12H Ken Perry MD           Allergies:  Allergies   Allergen Reactions   • Bactrim [Sulfamethoxazole-Trimethoprim] Other (See Comments)     \"RENAL FAILURE\"       Review of Systems   Review of Systems   Constitutional: Positive for chills, diaphoresis and unexpected weight change. Negative for appetite change, fatigue and fever.   HENT: Negative for sore throat and trouble swallowing.    Eyes: Negative for visual disturbance.   Respiratory: Negative for cough, chest tightness, shortness of breath and wheezing.    Cardiovascular: Negative for chest pain and palpitations.   Gastrointestinal: Positive for abdominal pain, constipation, nausea and " vomiting. Negative for abdominal distention, anal bleeding, blood in stool and rectal pain.        As mentioned in hpi   Genitourinary: Negative for difficulty urinating and hematuria.   Musculoskeletal: Negative for arthralgias and back pain.   Skin: Negative for color change and rash.   Neurological: Positive for syncope and weakness. Negative for dizziness, seizures, light-headedness and headaches.   Hematological: Negative for adenopathy.   Psychiatric/Behavioral: Negative for confusion. The patient is not nervous/anxious.         Objective     Vital Signs  Temp:  [97.4 °F (36.3 °C)-98.5 °F (36.9 °C)] 98.1 °F (36.7 °C)  Heart Rate:  [] 83  Resp:  [17-32] 18  BP: (115-176)/() 135/51    Physical Exam:   Physical Exam   Constitutional: He appears well-developed and well-nourished. No distress.   HENT:   Head: Normocephalic and atraumatic.   Eyes: EOM are normal. No scleral icterus.   Neck: Neck supple. No JVD present.   Cardiovascular: Normal rate, regular rhythm and normal heart sounds.   Pulmonary/Chest: Effort normal and breath sounds normal.   Abdominal: Soft. Bowel sounds are normal. He exhibits no distension and no mass. There is tenderness (mild tenderness ruq and lower abdomen, also noted bruising to rlq). There is no rebound and no guarding.   Musculoskeletal: Normal range of motion. He exhibits no deformity.   Neurological: He is alert.   Skin: Skin is warm and dry. No rash noted.   Psychiatric: He has a normal mood and affect. His behavior is normal.   Vitals reviewed.      Results Review:   I reviewed the patient's new clinical results.  I reviewed the patient's new imaging results and agree with the interpretation.  I reviewed the patient's other test results and agree with the interpretation    Lab Results (most recent)     Procedure Component Value Units Date/Time    CBC & Differential [[id number]] Collected:  02/08/19 0631    Specimen:  Blood Updated:  02/08/19 0711    Narrative:        The following orders were created for panel order CBC & Differential.  Procedure                               Abnormality         Status                     ---------                               -----------         ------                     Manual Differential[[id number]]          Abnormal            Final result               CBC Auto Differential[[id number]]        Abnormal            Final result                 Please view results for these tests on the individual orders.    CBC Auto Differential [[id number]]  (Abnormal) Collected:  02/08/19 0631    Specimen:  Blood Updated:  02/08/19 0743     WBC 8.41 10*3/mm3      RBC 4.08 10*6/mm3      Hemoglobin 12.3 g/dL      Hematocrit 36.4 %      MCV 89.2 fL      MCH 30.1 pg      MCHC 33.8 g/dL      RDW 14.5 %      RDW-SD 46.4 fl      MPV 11.2 fL      Platelets 246 10*3/mm3     Manual Differential [[id number]]  (Abnormal) Collected:  02/08/19 0631    Specimen:  Blood Updated:  02/08/19 0743     Neutrophil % 81.6 %      Lymphocyte % 12.2 %      Monocyte % 4.1 %      Eosinophil % 1.0 %      Basophil % 1.0 %      Neutrophils Absolute 6.86 10*3/mm3      Lymphocytes Absolute 1.03 10*3/mm3      Monocytes Absolute 0.34 10*3/mm3      Eosinophils Absolute 0.08 10*3/mm3      Basophils Absolute 0.08 10*3/mm3      RBC Morphology Normal     WBC Morphology Normal     Platelet Morphology Normal    Lipase [[id number]]  (Normal) Collected:  02/08/19 0631    Specimen:  Blood Updated:  02/08/19 0741     Lipase 25 U/L     Amylase [[id number]]  (Normal) Collected:  02/08/19 0631    Specimen:  Blood Updated:  02/08/19 0741     Amylase 53 U/L     Magnesium [[id number]]  (Normal) Collected:  02/08/19 0631    Specimen:  Blood Updated:  02/08/19 0741     Magnesium 2.2 mg/dL     Comprehensive Metabolic Panel [[id number]]  (Abnormal) Collected:  02/08/19 0631    Specimen:  Blood Updated:  02/08/19 0741     Glucose 240 mg/dL      BUN 16 mg/dL      Creatinine 1.41 mg/dL      Sodium 136 mmol/L       Potassium 4.2 mmol/L      Chloride 97 mmol/L      CO2 29.0 mmol/L      Calcium 8.7 mg/dL      Total Protein 6.4 g/dL      Albumin 3.90 g/dL      ALT (SGPT) 21 U/L      AST (SGOT) 29 U/L      Alkaline Phosphatase 59 U/L      Total Bilirubin 0.9 mg/dL      eGFR Non African Amer 51 mL/min/1.73      Globulin 2.5 gm/dL      A/G Ratio 1.6 g/dL      BUN/Creatinine Ratio 11.3     Anion Gap 10.0 mmol/L     Protime-INR [[id number]]  (Normal) Collected:  02/08/19 0631    Specimen:  Blood Updated:  02/08/19 0728     Protime 13.1 Seconds      INR 0.96    Hemoglobin A1c [[id number]] Collected:  02/08/19 0631    Specimen:  Blood Updated:  02/08/19 0714    Blood Culture - Blood, Arm, Right [[id number]] Collected:  02/07/19 1800    Specimen:  Blood from Arm, Right Updated:  02/08/19 0631     Blood Culture No growth at less than 24 hours    Blood Culture - Blood, Arm, Left [[id number]] Collected:  02/07/19 1742    Specimen:  Blood from Arm, Left Updated:  02/08/19 0601     Blood Culture No growth at less than 24 hours    Troponin [[id number]]  (Normal) Collected:  02/07/19 2119    Specimen:  Blood Updated:  02/07/19 2152     Troponin I <0.012 ng/mL     Lactic Acid, Reflex [[id number]]  (Normal) Collected:  02/07/19 2119    Specimen:  Blood Updated:  02/07/19 2140     Lactate 1.9 mmol/L     Urinalysis With Culture If Indicated - Urine, Clean Catch [[id number]]  (Abnormal) Collected:  02/07/19 2120    Specimen:  Urine, Clean Catch Updated:  02/07/19 2131     Color, UA Yellow     Appearance, UA Clear     pH, UA <=5.0     Specific Gravity, UA 1.012     Glucose,  mg/dL (Trace)     Ketones, UA Negative     Bilirubin, UA Negative     Blood, UA Negative     Protein, UA Negative     Leuk Esterase, UA Negative     Nitrite, UA Negative     Urobilinogen, UA 1.0 E.U./dL    Narrative:       Urine microscopic not indicated.    Lactic Acid, Reflex Timer (This will reflex a repeat order 3-3:15 hours after ordered.) [[id number]] Collected:  02/07/19  1742    Specimen:  Blood from Arm, Left Updated:  02/07/19 2115     Extra Tube Hold for add-ons.     Comment: Auto resulted.       [place] Draw [[id number]] Collected:  02/07/19 1742    Specimen:  Blood Updated:  02/07/19 1846    Narrative:       The following orders were created for panel order [place] Draw.  Procedure                               Abnormality         Status                     ---------                               -----------         ------                     Light Blue Top[[id number]]                                   Final result               Green Top (Gel)[[id number]]                                  Final result               Lavender Top[[id number]]                                     Final result               Red Top[[id number]]                                          Final result                 Please view results for these tests on the individual orders.    Light Blue Top [[id number]] Collected:  02/07/19 1742    Specimen:  Blood from Arm, Left Updated:  02/07/19 1846     Extra Tube hold for add-on     Comment: Auto resulted       Green Top (Gel) [[id number]] Collected:  02/07/19 1742    Specimen:  Blood from Arm, Left Updated:  02/07/19 1846     Extra Tube Hold for add-ons.     Comment: Auto resulted.       Lavender Top [[id number]] Collected:  02/07/19 1742    Specimen:  Blood from Arm, Left Updated:  02/07/19 1846     Extra Tube hold for add-on     Comment: Auto resulted       Red Top [[id number]] Collected:  02/07/19 1742    Specimen:  Blood from Arm, Left Updated:  02/07/19 1846     Extra Tube Hold for add-ons.     Comment: Auto resulted.       Troponin [[id number]]  (Normal) Collected:  02/07/19 1742    Specimen:  Blood from Arm, Left Updated:  02/07/19 1829     Troponin I <0.012 ng/mL     BNP [[id number]]  (Normal) Collected:  02/07/19 1742    Specimen:  Blood from Arm, Left Updated:  02/07/19 1829     proBNP 574.0 pg/mL     Lipase [[id number]]  (Abnormal) Collected:  02/07/19 1742     Specimen:  Blood from Arm, Left Updated:  02/07/19 1815     Lipase 16 U/L     Lactic Acid, Plasma [[id number]]  (Abnormal) Collected:  02/07/19 1742    Specimen:  Blood from Arm, Left Updated:  02/07/19 1811     Lactate 3.0 mmol/L     Comprehensive Metabolic Panel [[id number]]  (Abnormal) Collected:  02/07/19 1742    Specimen:  Blood from Arm, Left Updated:  02/07/19 1809     Glucose 204 mg/dL      BUN 13 mg/dL      Creatinine 1.40 mg/dL      Sodium 135 mmol/L      Potassium 4.0 mmol/L      Chloride 97 mmol/L      CO2 24.0 mmol/L      Calcium 9.2 mg/dL      Total Protein 7.1 g/dL      Albumin 4.50 g/dL      ALT (SGPT) 20 U/L      AST (SGOT) 34 U/L      Alkaline Phosphatase 62 U/L      Total Bilirubin 0.9 mg/dL      eGFR Non African Amer 51 mL/min/1.73      Globulin 2.6 gm/dL      A/G Ratio 1.7 g/dL      BUN/Creatinine Ratio 9.3     Anion Gap 14.0 mmol/L     CBC & Differential [[id number]] Collected:  02/07/19 1742    Specimen:  Blood Updated:  02/07/19 1754    Narrative:       The following orders were created for panel order CBC & Differential.  Procedure                               Abnormality         Status                     ---------                               -----------         ------                     CBC Auto Differential[[id number]]        Abnormal            Final result                 Please view results for these tests on the individual orders.    CBC Auto Differential [[id number]]  (Abnormal) Collected:  02/07/19 1742    Specimen:  Blood from Arm, Left Updated:  02/07/19 1754     WBC 8.59 10*3/mm3      RBC 4.33 10*6/mm3      Hemoglobin 13.1 g/dL      Hematocrit 38.7 %      MCV 89.4 fL      MCH 30.3 pg      MCHC 33.9 g/dL      RDW 14.4 %      RDW-SD 46.5 fl      MPV 10.6 fL      Platelets 260 10*3/mm3      Neutrophil % 73.7 %      Lymphocyte % 13.3 %      Monocyte % 8.3 %      Eosinophil % 3.8 %      Basophil % 0.6 %      Immature Grans % 0.3 %      Neutrophils, Absolute 6.33 10*3/mm3       Lymphocytes, Absolute 1.14 10*3/mm3      Monocytes, Absolute 0.71 10*3/mm3      Eosinophils, Absolute 0.33 10*3/mm3      Basophils, Absolute 0.05 10*3/mm3      Immature Grans, Absolute 0.03 10*3/mm3      nRBC 0.0 /100 WBC           Radiology:  Imaging Results (last 24 hours)     Procedure Component Value Units Date/Time    CT Abdomen Pelvis Without Contrast [[id number]] Collected:  02/07/19 1942     Updated:  02/07/19 1955    Narrative:       EXAMINATION: CT ABDOMEN PELVIS WO CONTRAST-      2/7/2019 7:11 PM CST     HISTORY: Fall injury. Blunt trauma. Low abdominal pain.     In order to have a CT radiation dose as low as reasonably achievable  Automated Exposure Control was utilized for adjustment of the mA and/or  KV according to patient size.     DLP in mGycm= 1797.     Noncontrast abdomen pelvis CT compared with 1/9/2019.     Normal heart size.  CABG changes with coronary artery calcification.  No acute abnormality at the lung bases.     Trace amounts of air are noted within the hepatic parenchyma in the  subdiaphragmatic region. This has the appearance of intravenous air and  a few mesenteric veins adjacent to the stomach and transverse colon also  show small amounts of air. There is no colonic wall thickening to  indicate ischemia.     No portal vein or superior mesenteric vein area seen.     There is no free fluid.  No bowel dilation.     No pelvic mass or fluid.  There is moderate fecal material throughout the colon.  No diverticulitis or colitis.     Normal and symmetric adrenal glands and kidneys.  3 cm right renal cyst noted.     Summary:  1. Small amounts of mesenteric venous air within the upper abdomen and  small amounts of venous air within the liver. (Has this patient had  recent endoscopy or colonoscopy?)  2. No fluid, mass, or signs of bowel ischemia.                                   This report was finalized on 02/07/2019 19:51 by Dr. Gomez Mcgill MD.    CT Cervical Spine Without Contrast  [[id number]] Collected:  02/07/19 1941     Updated:  02/07/19 1945    Narrative:       EXAMINATION: CT CERVICAL SPINE WO CONTRAST-      2/7/2019 7:11 PM CST     HISTORY: Fall injury. Neck pain.     In order to have a CT radiation dose as low as reasonably achievable  Automated Exposure Control was utilized for adjustment of the mA and/or  KV according to patient size.     DLP in mGycm= 253.     Axial, sagittal, and coronal noncontrast CT imaging.     Vertebral bodies and posterior elements are intact.     Reconstructed sagittal images show normal curvature.   There is no malalignment. Prevertebral soft tissues are normal.   Facet joints align normally.     Reconstructed coronal images show normal lateral mass alignment.     C6-7 discectomy with anterior plate and screw fusion.       Impression:       1. No acute bony abnormality.                                         This report was finalized on 02/07/2019 19:42 by Dr. Gomez Mcgill MD.    CT Head Without Contrast [[id number]] Collected:  02/07/19 1941     Updated:  02/07/19 1944    Narrative:       EXAMINATION: CT HEAD WO CONTRAST-      2/7/2019 7:11 PM CST     HISTORY: fall, head injury     In order to have a CT radiation dose as low as reasonably achievable  Automated Exposure Control was utilized for adjustment of the mA and/or  KV according to patient size.     DLP in mGycm= 818.     Axial, sagittal, and coronal noncontrast CT imaging of the head.     The visualized paranasal sinuses are clear.     The brain and ventricles have an age appropriate appearance.   There is no hemorrhage or mass-effect.   No acute infarction is seen.     No calvarial abnormality.       Impression:       1. No acute intracranial abnormality is seen.                                         This report was finalized on 02/07/2019 19:41 by Dr. Gomez Mcgill MD.    XR Chest 1 View [[id number]] Collected:  02/07/19 1832     Updated:  02/07/19 1835    Narrative:       EXAMINATION: XR CHEST  1 VW-     2/7/2019 6:16 PM CST     HISTORY: Short of breath.     1 view chest x-ray compared with 1/17/2018.     CABG changes.     Heart size is normal.  The mediastinum is within normal limits.      The lungs are normally expanded with no pneumonia or pneumothorax.       No congestive failure changes.                                                                       Impression:       1. No acute disease.        This report was finalized on 02/07/2019 18:32 by Dr. Gomez Mcgill MD.            Assessment/Plan       CHF (congestive heart failure) (CMS/[facility])    CAD (coronary artery disease)    Chest pain, non-cardiac    Obesity, unspecified obesity severity, unspecified obesity type    HTN (hypertension), benign    Type 2 diabetes mellitus with hyperglycemia, with long-term current use of insulin (CMS/[facility])    Orthostatic hypotension    Abdominal pain    Constipation      1. Abnormal ct abd/pelvis   ( Small amounts of mesenteric venous air within the      upper abdomen and  small amounts of venous air within the liver).   2. Abdominal pain   3. N/v     I discussed the patients findings and my recommendations with patient, family and nursing staff      EMR Dragon/transcription disclaimer:  Much of this encounter note is electronic transcription/translation of spoken language to printed text.  The electronic translation of spoken language may be erroneous, or at times, nonsensical words or phrases may be inadvertently transcribed.  Although I have reviewed the note for such errors, some may still exist.    Justyna Barry APRN 8:40 AM    SUSAN Strange  02/08/19  8:49 AM          Electronically signed by Justyna Barry APRN at 2/8/2019  8:51 AM     Raquel Duncan APRN at 2/7/2019  9:15 PM      Consult Orders    1. Inpatient Hospitalist Consult [[id number]] ordered by Ken Perry MD at 02/07/19 2100                      [facility] ER Medicine Consult Note    Date of Admission:  "2/7/2019  Date of Consult: 02/07/19    Primary Care Physician: Del Shetty MD  Referring Physician: Ken Perry MD    Chief complaint/Reason for consultation: Blood pressure and diabetes management    History of Present Illness  Erick Copeland is a 63-year-old male with a past medical history significant for insulin-dependent diabetes, hypertension, autonomic nervous system neuropathy with recent increase in midodrine to 10 mg 3 times a day, chronic constipation followed by [facility], coronary artery disease status post CABG, chronic noncardiac chest pain, chronic diastolic dysfunction, hyperlipidemia and obesity.  Patient presented to [facility] today with complaints of abdominal pain that started approximately noon.  He states this is a different type of pain that he has had in the past.  He describes it as severe and \"unbearable\".  He has had nausea and vomiting without diarrhea.  ER workup revealed small amounts of mesenteric venous air within the upper abdomen and small amounts of venous air within the liver.  There is no signs of bowel ischemia.  He is admitted to general surgery.  Hospitalist have been consulted for management of medical conditions.  Patient reports he takes 100 units of regular insulin with meals.  He takes no long-acting insulin.  He has had recent problems with hypoglycemia however admission glucose is 204.  Patient meets criteria for sepsis however white count is normal, blood pressure is elevated/within normal limits, he is afebrile.  Initially patient did have tachycardia at 103 and apnea at 32 however this was related to his pain not to active infection.  Bolus not given.  Patient was started on antibiotics by attending.  We will follow closely.      Review of Systems   A 10 point review of systems was completed all negative except for those discussed in HPI    Past Medical History:   Past Medical History:   Diagnosis Date   • Arthritis    • CHF (congestive heart " "failure) (CMS/[facility])    • Coronary artery disease    • Diabetes mellitus (CMS/[facility])    • Hyperlipidemia    • Hypertension    • Myocardial infarction (CMS/[facility])    • Pancreatitis    • Renal disorder    • Sleep apnea with use of continuous positive airway pressure (CPAP)        Past Surgical History:   Past Surgical History:   Procedure Laterality Date   • ABDOMINAL SURGERY     • APPENDECTOMY     • BACK SURGERY     • CARDIAC SURGERY     • CATARACT EXTRACTION     • CERVICAL SPINE SURGERY     • COLONOSCOPY N/A 1/31/2017    Normal exam repeat in 5 years   • COLONOSCOPY W/ POLYPECTOMY  03/04/2014    Hyperplastic polyp   • CORONARY ARTERY BYPASS GRAFT  10/2015   • ENDOSCOPY  04/13/2011    Gastritis with hemorrhage   • ENDOSCOPY N/A 5/5/2017    Normal exam   • INCISION AND DRAINAGE OF WOUND Left 09/2007    spider bite       Code Status: Full, if unable to speak for himself his wife will speak for him    Family History: family history includes Alzheimer's disease in his mother; Colon cancer in his father and sister; Colon polyps in his sister; Coronary artery disease in his sister and sister; Heart disease in his father.    Social History:  reports that  has never smoked. he has never used smokeless tobacco. He reports that he does not drink alcohol or use drugs.    Allergies:   Allergies   Allergen Reactions   • Bactrim [Sulfamethoxazole-Trimethoprim] Other (See Comments)     \"RENAL FAILURE\"       Home Medications:   Prior to Admission medications    Medication Sig Start Date End Date Taking? Authorizing Provider   allopurinol (ZYLOPRIM) 100 MG tablet Take 100 mg by mouth Daily.    ProviderBossman MD   amitriptyline (ELAVIL) 50 MG tablet Take 50 mg by mouth Every Night.    ProviderBossman MD   aspirin 81 MG EC tablet Take 81 mg by mouth Daily.    ProviderBossman MD   escitalopram (LEXAPRO) 10 MG tablet Take 10 mg by mouth Daily.    ProviderBossman MD   insulin regular (humuLIN R) 500 UNIT/ML CONCENTRATED " injection Inject 100 Units under the skin 3 (Three) Times a Day Before Meals. Packaging states 100 units with regular meals; 120 units with large meals or 360 units daily    Bossman Blount MD   isosorbide mononitrate (IMDUR) 30 MG 24 hr tablet Take 30 mg by mouth Daily.    Bossman Blount MD   linaclotide (LINZESS) 290 MCG capsule capsule Take 290 mcg by mouth Every Morning Before Breakfast.    Bossman Blount MD   metOLazone (ZAROXOLYN) 2.5 MG tablet Take 2.5 mg by mouth Daily.    Bossman Blount MD   midodrine (PROAMATINE) 5 MG tablet Take 5 mg by mouth 3 (Three) Times a Day. 1/11/19   Bossman Blount MD   nebivolol (BYSTOLIC) 5 MG tablet Take 5 mg by mouth Daily.    Bossman Blount MD   ondansetron ODT (ZOFRAN-ODT) 4 MG disintegrating tablet Take 4 mg by mouth 4 (Four) Times a Day As Needed for Nausea or Vomiting.    Bossman Blount MD   oxyCODONE-acetaminophen (PERCOCET)  MG per tablet Take 1 tablet by mouth 2 (Two) Times a Day With Meals.    Bossman Blount MD   pantoprazole (PROTONIX) 40 MG EC tablet Take 1 tablet by mouth Daily. 9/19/17   Emeka Garay MD   polyethylene glycol (GoLYTELY) 236 g solution Take as directed by office instructions. 1/16/19   Yuliana Vinson APRN   rosuvastatin (CRESTOR) 40 MG tablet Take 40 mg by mouth Daily.    Bossman Blount MD       Scheduled Medications    [START ON 2/8/2019] allopurinol 100 mg Oral Daily   amitriptyline 50 mg Oral Nightly   enoxaparin 30 mg Subcutaneous Q12H   [START ON 2/8/2019] escitalopram 10 mg Oral Daily    mL Rectal Once   [START ON 2/8/2019] insulin lispro 0-24 Units Subcutaneous 4x Daily With Meals & Nightly   [START ON 2/8/2019] isosorbide mononitrate 30 mg Oral Daily   lactated ringers 1,000 mL Intravenous Once   [START ON 2/8/2019] meropenem 2 g Intravenous Q8H   [START ON 2/8/2019] metOLazone 2.5 mg Oral Daily   midodrine 10 mg Oral TID   [START ON 2/8/2019]  nebivolol 5 mg Oral Daily   [START ON 2/8/2019] pantoprazole 40 mg Oral Daily   [START ON 2/8/2019] rosuvastatin 40 mg Oral Daily   sodium chloride 3 mL Intravenous Q12H       Pertinent Data:   Lab Results (last 72 hours)     Procedure Component Value Units Date/Time    Troponin [[id number]]  (Normal) Collected:  02/07/19 2119    Specimen:  Blood Updated:  02/07/19 2152     Troponin I <0.012 ng/mL     Lactic Acid, Reflex [[id number]]  (Normal) Collected:  02/07/19 2119    Specimen:  Blood Updated:  02/07/19 2140     Lactate 1.9 mmol/L     Urinalysis With Culture If Indicated - Urine, Clean Catch [[id number]]  (Abnormal) Collected:  02/07/19 2120    Specimen:  Urine, Clean Catch Updated:  02/07/19 2131     Color, UA Yellow     Appearance, UA Clear     pH, UA <=5.0     Specific Gravity, UA 1.012     Glucose,  mg/dL (Trace)     Ketones, UA Negative     Bilirubin, UA Negative     Blood, UA Negative     Protein, UA Negative     Leuk Esterase, UA Negative     Nitrite, UA Negative     Urobilinogen, UA 1.0 E.U./dL    Narrative:       Urine microscopic not indicated.    Lactic Acid, Reflex Timer (This will reflex a repeat order 3-3:15 hours after ordered.) [[id number]] Collected:  02/07/19 1742    Specimen:  Blood from Arm, Left Updated:  02/07/19 2115     Extra Tube Hold for add-ons.     Comment: Auto resulted.       Troponin [[id number]]  (Normal) Collected:  02/07/19 1742    Specimen:  Blood from Arm, Left Updated:  02/07/19 1829     Troponin I <0.012 ng/mL     BNP [[id number]]  (Normal) Collected:  02/07/19 1742    Specimen:  Blood from Arm, Left Updated:  02/07/19 1829     proBNP 574.0 pg/mL     Blood Culture - Blood, Arm, Right [[id number]] Collected:  02/07/19 1800    Specimen:  Blood from Arm, Right Updated:  02/07/19 1816    Lipase [[id number]]  (Abnormal) Collected:  02/07/19 1742    Specimen:  Blood from Arm, Left Updated:  02/07/19 1815     Lipase 16 U/L     Lactic Acid, Plasma [[id number]]  (Abnormal) Collected:   02/07/19 1742    Specimen:  Blood from Arm, Left Updated:  02/07/19 1811     Lactate 3.0 mmol/L     Comprehensive Metabolic Panel [[id number]]  (Abnormal) Collected:  02/07/19 1742    Specimen:  Blood from Arm, Left Updated:  02/07/19 1809     Glucose 204 mg/dL      BUN 13 mg/dL      Creatinine 1.40 mg/dL      Sodium 135 mmol/L      Potassium 4.0 mmol/L      Chloride 97 mmol/L      CO2 24.0 mmol/L      Calcium 9.2 mg/dL      Total Protein 7.1 g/dL      Albumin 4.50 g/dL      ALT (SGPT) 20 U/L      AST (SGOT) 34 U/L      Alkaline Phosphatase 62 U/L      Total Bilirubin 0.9 mg/dL      eGFR Non African Amer 51 mL/min/1.73      Globulin 2.6 gm/dL      A/G Ratio 1.7 g/dL      BUN/Creatinine Ratio 9.3     Anion Gap 14.0 mmol/L     Blood Culture - Blood, Arm, Left [[id number]] Collected:  02/07/19 1742    Specimen:  Blood from Arm, Left Updated:  02/07/19 1756    CBC Auto Differential [[id number]]  (Abnormal) Collected:  02/07/19 1742    Specimen:  Blood from Arm, Left Updated:  02/07/19 1754     WBC 8.59 10*3/mm3      RBC 4.33 10*6/mm3      Hemoglobin 13.1 g/dL      Hematocrit 38.7 %      MCV 89.4 fL      MCH 30.3 pg      MCHC 33.9 g/dL      RDW 14.4 %      RDW-SD 46.5 fl      MPV 10.6 fL      Platelets 260 10*3/mm3      Neutrophil % 73.7 %      Lymphocyte % 13.3 %      Monocyte % 8.3 %      Eosinophil % 3.8 %      Basophil % 0.6 %      Immature Grans % 0.3 %      Neutrophils, Absolute 6.33 10*3/mm3      Lymphocytes, Absolute 1.14 10*3/mm3      Monocytes, Absolute 0.71 10*3/mm3      Eosinophils, Absolute 0.33 10*3/mm3      Basophils, Absolute 0.05 10*3/mm3      Immature Grans, Absolute 0.03 10*3/mm3      nRBC 0.0 /100 WBC         Imaging Results (last 24 hours)     Procedure Component Value Units Date/Time    CT Abdomen Pelvis Without Contrast [[id number]] Collected:  02/07/19 1942     Updated:  02/07/19 1955    Narrative:       EXAMINATION: CT ABDOMEN PELVIS WO CONTRAST-      2/7/2019 7:11 PM CST     HISTORY: Fall injury.  Blunt trauma. Low abdominal pain.     In order to have a CT radiation dose as low as reasonably achievable  Automated Exposure Control was utilized for adjustment of the mA and/or  KV according to patient size.     DLP in mGycm= 1797.     Noncontrast abdomen pelvis CT compared with 1/9/2019.     Normal heart size.  CABG changes with coronary artery calcification.  No acute abnormality at the lung bases.     Trace amounts of air are noted within the hepatic parenchyma in the  subdiaphragmatic region. This has the appearance of intravenous air and  a few mesenteric veins adjacent to the stomach and transverse colon also  show small amounts of air. There is no colonic wall thickening to  indicate ischemia.     No portal vein or superior mesenteric vein area seen.     There is no free fluid.  No bowel dilation.     No pelvic mass or fluid.  There is moderate fecal material throughout the colon.  No diverticulitis or colitis.     Normal and symmetric adrenal glands and kidneys.  3 cm right renal cyst noted.     Summary:  1. Small amounts of mesenteric venous air within the upper abdomen and  small amounts of venous air within the liver. (Has this patient had  recent endoscopy or colonoscopy?)  2. No fluid, mass, or signs of bowel ischemia.                                   This report was finalized on 02/07/2019 19:51 by Dr. Gomez Mcgill MD.    CT Cervical Spine Without Contrast [[id number]] Collected:  02/07/19 1941     Updated:  02/07/19 1945    Narrative:       EXAMINATION: CT CERVICAL SPINE WO CONTRAST-      2/7/2019 7:11 PM CST     HISTORY: Fall injury. Neck pain.     In order to have a CT radiation dose as low as reasonably achievable  Automated Exposure Control was utilized for adjustment of the mA and/or  KV according to patient size.     DLP in mGycm= 253.     Axial, sagittal, and coronal noncontrast CT imaging.     Vertebral bodies and posterior elements are intact.     Reconstructed sagittal images show normal  curvature.   There is no malalignment. Prevertebral soft tissues are normal.   Facet joints align normally.     Reconstructed coronal images show normal lateral mass alignment.     C6-7 discectomy with anterior plate and screw fusion.       Impression:       1. No acute bony abnormality.                                         This report was finalized on 02/07/2019 19:42 by Dr. Gomez Mcgill MD.    CT Head Without Contrast [[id number]] Collected:  02/07/19 1941     Updated:  02/07/19 1944    Narrative:       EXAMINATION: CT HEAD WO CONTRAST-      2/7/2019 7:11 PM CST     HISTORY: fall, head injury     In order to have a CT radiation dose as low as reasonably achievable  Automated Exposure Control was utilized for adjustment of the mA and/or  KV according to patient size.     DLP in mGycm= 818.     Axial, sagittal, and coronal noncontrast CT imaging of the head.     The visualized paranasal sinuses are clear.     The brain and ventricles have an age appropriate appearance.   There is no hemorrhage or mass-effect.   No acute infarction is seen.     No calvarial abnormality.       Impression:       1. No acute intracranial abnormality is seen.         This report was finalized on 02/07/2019 19:41 by Dr. Gomez Mcgill MD.    XR Chest 1 View [[id number]] Collected:  02/07/19 1832     Updated:  02/07/19 1835    Narrative:       EXAMINATION: XR CHEST 1 VW-     2/7/2019 6:16 PM CST     HISTORY: Short of breath.     1 view chest x-ray compared with 1/17/2018.     CABG changes.     Heart size is normal.  The mediastinum is within normal limits.      The lungs are normally expanded with no pneumonia or pneumothorax.       No congestive failure changes.                                                                       Impression:       1. No acute disease.        This report was finalized on 02/07/2019 18:32 by Dr. Gomez Mcgill MD.          Temp:  [97.4 °F (36.3 °C)] 97.4 °F (36.3 °C)  Heart Rate:  [] 91  Resp:   [17-32] 17  BP: (153-176)/() 153/85    Physical Exam   Constitutional: He is oriented to person, place, and time. He appears well-developed and well-nourished. No distress.   Morbidly obese   HENT:   Head: Normocephalic and atraumatic.   Eyes: Conjunctivae and EOM are normal. Pupils are equal, round, and reactive to light. No scleral icterus.   Neck: Normal range of motion. Neck supple. No JVD present. No tracheal deviation present.   Cardiovascular: Normal rate, regular rhythm, normal heart sounds and intact distal pulses. Exam reveals no gallop.   No murmur heard.  Pulmonary/Chest: Effort normal and breath sounds normal. No respiratory distress. He has no wheezes. He has no rales.   Abdominal: Soft. Bowel sounds are normal. He exhibits no distension. There is no tenderness. There is no guarding.   Musculoskeletal: Normal range of motion. He exhibits no edema.   Neurological: He is alert and oriented to person, place, and time.   No obvious deficits noted.   Skin: Skin is warm and dry. No rash noted. He is not diaphoretic. No erythema. No pallor.   Psychiatric: He has a normal mood and affect. His behavior is normal.   Vitals reviewed.      Assessment:     Active Hospital Problems    Diagnosis   • Abdominal pain   • Constipation   • Orthostatic hypotension   • Obesity, unspecified obesity severity, unspecified obesity type   • HTN (hypertension), benign   • Type 2 diabetes mellitus with hyperglycemia, with long-term current use of insulin (CMS/[facility])   • Chest pain, non-cardiac   • CAD (coronary artery disease)   • CHF (congestive heart failure) (CMS/[facility])       Plan:   1.  Monitor glucose every 6 hours as patient is n.p.o., regular insulin high dose  2.  Home antihypertensives reviewed and restarted as appropriate  3.  Pain management per attending  4.  Labs in a.m.    I discussed the patients findings and my recommendations with: Ian Ann MD  Time spent: 35 minutes    Raquel Duncan  APRN  02/07/19  10:44 PM            Electronically signed by Raquel Duncan APRN at 2/7/2019 10:48 PM         2/7  zofran iv x1  2/8  Morphine iv x2       2/8  Nurse note :   pt c/o abdominal pain, PRN IV pain medication given as prescribed. Pt c/o N/V, PRN PO/IV zofran given with some relief noted. IV abx maintained. HOG enema given, no results at this time. VSS, safety maintained, NPO status maintained, will continue to monitor

## 2019-02-08 NOTE — CONSULTS
"      [facility] Medicine Consult Note    Date of Admission: 2/7/2019  Date of Consult: 02/07/19    Primary Care Physician: Del Shetty MD  Referring Physician: Ken Perry MD    Chief complaint/Reason for consultation: Blood pressure and diabetes management    History of Present Illness  Erick Copeland is a 63-year-old male with a past medical history significant for insulin-dependent diabetes, hypertension, autonomic nervous system neuropathy with recent increase in midodrine to 10 mg 3 times a day, chronic constipation followed by [facility], coronary artery disease status post CABG, chronic noncardiac chest pain, chronic diastolic dysfunction, hyperlipidemia and obesity.  Patient presented to [facility] today with complaints of abdominal pain that started approximately noon.  He states this is a different type of pain that he has had in the past.  He describes it as severe and \"unbearable\".  He has had nausea and vomiting without diarrhea.  ER workup revealed small amounts of mesenteric venous air within the upper abdomen and small amounts of venous air within the liver.  There is no signs of bowel ischemia.  He is admitted to general surgery.  Hospitalist have been consulted for management of medical conditions.  Patient reports he takes 100 units of regular insulin with meals.  He takes no long-acting insulin.  He has had recent problems with hypoglycemia however admission glucose is 204.  Patient meets criteria for sepsis however white count is normal, blood pressure is elevated/within normal limits, he is afebrile.  Initially patient did have tachycardia at 103 and apnea at 32 however this was related to his pain not to active infection.  Bolus not given.  Patient was started on antibiotics by attending.  We will follow closely.      Review of Systems   A 10 point review of systems was completed all negative except for those discussed in HPI    Past Medical " "History:   Past Medical History:   Diagnosis Date   • Arthritis    • CHF (congestive heart failure) (CMS/[facility])    • Coronary artery disease    • Diabetes mellitus (CMS/[facility])    • Hyperlipidemia    • Hypertension    • Myocardial infarction (CMS/[facility])    • Pancreatitis    • Renal disorder    • Sleep apnea with use of continuous positive airway pressure (CPAP)        Past Surgical History:   Past Surgical History:   Procedure Laterality Date   • ABDOMINAL SURGERY     • APPENDECTOMY     • BACK SURGERY     • CARDIAC SURGERY     • CATARACT EXTRACTION     • CERVICAL SPINE SURGERY     • COLONOSCOPY N/A 1/31/2017    Normal exam repeat in 5 years   • COLONOSCOPY W/ POLYPECTOMY  03/04/2014    Hyperplastic polyp   • CORONARY ARTERY BYPASS GRAFT  10/2015   • ENDOSCOPY  04/13/2011    Gastritis with hemorrhage   • ENDOSCOPY N/A 5/5/2017    Normal exam   • INCISION AND DRAINAGE OF WOUND Left 09/2007    spider bite       Code Status: Full, if unable to speak for himself his wife will speak for him    Family History: family history includes Alzheimer's disease in his mother; Colon cancer in his father and sister; Colon polyps in his sister; Coronary artery disease in his sister and sister; Heart disease in his father.    Social History:  reports that  has never smoked. he has never used smokeless tobacco. He reports that he does not drink alcohol or use drugs.    Allergies:   Allergies   Allergen Reactions   • Bactrim [Sulfamethoxazole-Trimethoprim] Other (See Comments)     \"RENAL FAILURE\"       Home Medications:   Prior to Admission medications    Medication Sig Start Date End Date Taking? Authorizing Provider   allopurinol (ZYLOPRIM) 100 MG tablet Take 100 mg by mouth Daily.    Provider, MD Bossman   amitriptyline (ELAVIL) 50 MG tablet Take 50 mg by mouth Every Night.    Provider, MD Bossman   aspirin 81 MG EC tablet Take 81 mg by mouth Daily.    Provider, MD Bossman   escitalopram (LEXAPRO) 10 MG tablet Take 10 mg by " mouth Daily.    Bossman Blount MD   insulin regular (humuLIN R) 500 UNIT/ML CONCENTRATED injection Inject 100 Units under the skin 3 (Three) Times a Day Before Meals. Packaging states 100 units with regular meals; 120 units with large meals or 360 units daily    Bossman Blount MD   isosorbide mononitrate (IMDUR) 30 MG 24 hr tablet Take 30 mg by mouth Daily.    Bossman Blount MD   linaclotide (LINZESS) 290 MCG capsule capsule Take 290 mcg by mouth Every Morning Before Breakfast.    Bossman Blount MD   metOLazone (ZAROXOLYN) 2.5 MG tablet Take 2.5 mg by mouth Daily.    Bossman Blount MD   midodrine (PROAMATINE) 5 MG tablet Take 5 mg by mouth 3 (Three) Times a Day. 1/11/19   Bossman Blount MD   nebivolol (BYSTOLIC) 5 MG tablet Take 5 mg by mouth Daily.    Bossman Blount MD   ondansetron ODT (ZOFRAN-ODT) 4 MG disintegrating tablet Take 4 mg by mouth 4 (Four) Times a Day As Needed for Nausea or Vomiting.    Bossman Blount MD   oxyCODONE-acetaminophen (PERCOCET)  MG per tablet Take 1 tablet by mouth 2 (Two) Times a Day With Meals.    Bossman Blount MD   pantoprazole (PROTONIX) 40 MG EC tablet Take 1 tablet by mouth Daily. 9/19/17   Emeka Garay MD   polyethylene glycol (GoLYTELY) 236 g solution Take as directed by office instructions. 1/16/19   Yuliana Vinson APRN   rosuvastatin (CRESTOR) 40 MG tablet Take 40 mg by mouth Daily.    Bossman Blount MD       Scheduled Medications    [START ON 2/8/2019] allopurinol 100 mg Oral Daily   amitriptyline 50 mg Oral Nightly   enoxaparin 30 mg Subcutaneous Q12H   [START ON 2/8/2019] escitalopram 10 mg Oral Daily    mL Rectal Once   [START ON 2/8/2019] insulin lispro 0-24 Units Subcutaneous 4x Daily With Meals & Nightly   [START ON 2/8/2019] isosorbide mononitrate 30 mg Oral Daily   lactated ringers 1,000 mL Intravenous Once   [START ON 2/8/2019] meropenem 2 g Intravenous Q8H   [START  ON 2/8/2019] metOLazone 2.5 mg Oral Daily   midodrine 10 mg Oral TID   [START ON 2/8/2019] nebivolol 5 mg Oral Daily   [START ON 2/8/2019] pantoprazole 40 mg Oral Daily   [START ON 2/8/2019] rosuvastatin 40 mg Oral Daily   sodium chloride 3 mL Intravenous Q12H       Pertinent Data:   Lab Results (last 72 hours)     Procedure Component Value Units Date/Time    Troponin [[id number]]  (Normal) Collected:  02/07/19 2119    Specimen:  Blood Updated:  02/07/19 2152     Troponin I <0.012 ng/mL     Lactic Acid, Reflex [[id number]]  (Normal) Collected:  02/07/19 2119    Specimen:  Blood Updated:  02/07/19 2140     Lactate 1.9 mmol/L     Urinalysis With Culture If Indicated - Urine, Clean Catch [[id number]]  (Abnormal) Collected:  02/07/19 2120    Specimen:  Urine, Clean Catch Updated:  02/07/19 2131     Color, UA Yellow     Appearance, UA Clear     pH, UA <=5.0     Specific Gravity, UA 1.012     Glucose,  mg/dL (Trace)     Ketones, UA Negative     Bilirubin, UA Negative     Blood, UA Negative     Protein, UA Negative     Leuk Esterase, UA Negative     Nitrite, UA Negative     Urobilinogen, UA 1.0 E.U./dL    Narrative:       Urine microscopic not indicated.    Lactic Acid, Reflex Timer (This will reflex a repeat order 3-3:15 hours after ordered.) [[id number]] Collected:  02/07/19 1742    Specimen:  Blood from Arm, Left Updated:  02/07/19 2115     Extra Tube Hold for add-ons.     Comment: Auto resulted.       Troponin [[id number]]  (Normal) Collected:  02/07/19 1742    Specimen:  Blood from Arm, Left Updated:  02/07/19 1829     Troponin I <0.012 ng/mL     BNP [[id number]]  (Normal) Collected:  02/07/19 1742    Specimen:  Blood from Arm, Left Updated:  02/07/19 1829     proBNP 574.0 pg/mL     Blood Culture - Blood, Arm, Right [[id number]] Collected:  02/07/19 1800    Specimen:  Blood from Arm, Right Updated:  02/07/19 1816    Lipase [[id number]]  (Abnormal) Collected:  02/07/19 0678    Specimen:  Blood from Arm, Left Updated:   02/07/19 1815     Lipase 16 U/L     Lactic Acid, Plasma [[id number]]  (Abnormal) Collected:  02/07/19 1742    Specimen:  Blood from Arm, Left Updated:  02/07/19 1811     Lactate 3.0 mmol/L     Comprehensive Metabolic Panel [[id number]]  (Abnormal) Collected:  02/07/19 1742    Specimen:  Blood from Arm, Left Updated:  02/07/19 1809     Glucose 204 mg/dL      BUN 13 mg/dL      Creatinine 1.40 mg/dL      Sodium 135 mmol/L      Potassium 4.0 mmol/L      Chloride 97 mmol/L      CO2 24.0 mmol/L      Calcium 9.2 mg/dL      Total Protein 7.1 g/dL      Albumin 4.50 g/dL      ALT (SGPT) 20 U/L      AST (SGOT) 34 U/L      Alkaline Phosphatase 62 U/L      Total Bilirubin 0.9 mg/dL      eGFR Non African Amer 51 mL/min/1.73      Globulin 2.6 gm/dL      A/G Ratio 1.7 g/dL      BUN/Creatinine Ratio 9.3     Anion Gap 14.0 mmol/L     Blood Culture - Blood, Arm, Left [[id number]] Collected:  02/07/19 1742    Specimen:  Blood from Arm, Left Updated:  02/07/19 1756    CBC Auto Differential [[id number]]  (Abnormal) Collected:  02/07/19 1742    Specimen:  Blood from Arm, Left Updated:  02/07/19 1754     WBC 8.59 10*3/mm3      RBC 4.33 10*6/mm3      Hemoglobin 13.1 g/dL      Hematocrit 38.7 %      MCV 89.4 fL      MCH 30.3 pg      MCHC 33.9 g/dL      RDW 14.4 %      RDW-SD 46.5 fl      MPV 10.6 fL      Platelets 260 10*3/mm3      Neutrophil % 73.7 %      Lymphocyte % 13.3 %      Monocyte % 8.3 %      Eosinophil % 3.8 %      Basophil % 0.6 %      Immature Grans % 0.3 %      Neutrophils, Absolute 6.33 10*3/mm3      Lymphocytes, Absolute 1.14 10*3/mm3      Monocytes, Absolute 0.71 10*3/mm3      Eosinophils, Absolute 0.33 10*3/mm3      Basophils, Absolute 0.05 10*3/mm3      Immature Grans, Absolute 0.03 10*3/mm3      nRBC 0.0 /100 WBC         Imaging Results (last 24 hours)     Procedure Component Value Units Date/Time    CT Abdomen Pelvis Without Contrast [[id number]] Collected:  02/07/19 1942     Updated:  02/07/19 1955    Narrative:        EXAMINATION: CT ABDOMEN PELVIS WO CONTRAST-      2/7/2019 7:11 PM CST     HISTORY: Fall injury. Blunt trauma. Low abdominal pain.     In order to have a CT radiation dose as low as reasonably achievable  Automated Exposure Control was utilized for adjustment of the mA and/or  KV according to patient size.     DLP in mGycm= 1797.     Noncontrast abdomen pelvis CT compared with 1/9/2019.     Normal heart size.  CABG changes with coronary artery calcification.  No acute abnormality at the lung bases.     Trace amounts of air are noted within the hepatic parenchyma in the  subdiaphragmatic region. This has the appearance of intravenous air and  a few mesenteric veins adjacent to the stomach and transverse colon also  show small amounts of air. There is no colonic wall thickening to  indicate ischemia.     No portal vein or superior mesenteric vein area seen.     There is no free fluid.  No bowel dilation.     No pelvic mass or fluid.  There is moderate fecal material throughout the colon.  No diverticulitis or colitis.     Normal and symmetric adrenal glands and kidneys.  3 cm right renal cyst noted.     Summary:  1. Small amounts of mesenteric venous air within the upper abdomen and  small amounts of venous air within the liver. (Has this patient had  recent endoscopy or colonoscopy?)  2. No fluid, mass, or signs of bowel ischemia.                                   This report was finalized on 02/07/2019 19:51 by Dr. Gomez Mcgill MD.    CT Cervical Spine Without Contrast [[id number]] Collected:  02/07/19 1941     Updated:  02/07/19 1945    Narrative:       EXAMINATION: CT CERVICAL SPINE WO CONTRAST-      2/7/2019 7:11 PM CST     HISTORY: Fall injury. Neck pain.     In order to have a CT radiation dose as low as reasonably achievable  Automated Exposure Control was utilized for adjustment of the mA and/or  KV according to patient size.     DLP in mGycm= 253.     Axial, sagittal, and coronal noncontrast CT imaging.      Vertebral bodies and posterior elements are intact.     Reconstructed sagittal images show normal curvature.   There is no malalignment. Prevertebral soft tissues are normal.   Facet joints align normally.     Reconstructed coronal images show normal lateral mass alignment.     C6-7 discectomy with anterior plate and screw fusion.       Impression:       1. No acute bony abnormality.                                         This report was finalized on 02/07/2019 19:42 by Dr. Gomez Mcgill MD.    CT Head Without Contrast [[id number]] Collected:  02/07/19 1941     Updated:  02/07/19 1944    Narrative:       EXAMINATION: CT HEAD WO CONTRAST-      2/7/2019 7:11 PM CST     HISTORY: fall, head injury     In order to have a CT radiation dose as low as reasonably achievable  Automated Exposure Control was utilized for adjustment of the mA and/or  KV according to patient size.     DLP in mGycm= 818.     Axial, sagittal, and coronal noncontrast CT imaging of the head.     The visualized paranasal sinuses are clear.     The brain and ventricles have an age appropriate appearance.   There is no hemorrhage or mass-effect.   No acute infarction is seen.     No calvarial abnormality.       Impression:       1. No acute intracranial abnormality is seen.         This report was finalized on 02/07/2019 19:41 by Dr. Gomez Mcgill MD.    XR Chest 1 View [[id number]] Collected:  02/07/19 1832     Updated:  02/07/19 1835    Narrative:       EXAMINATION: XR CHEST 1 VW-     2/7/2019 6:16 PM CST     HISTORY: Short of breath.     1 view chest x-ray compared with 1/17/2018.     CABG changes.     Heart size is normal.  The mediastinum is within normal limits.      The lungs are normally expanded with no pneumonia or pneumothorax.       No congestive failure changes.                                                                       Impression:       1. No acute disease.        This report was finalized on 02/07/2019 18:32 by Dr. Dyer  MD Nano.          Temp:  [97.4 °F (36.3 °C)] 97.4 °F (36.3 °C)  Heart Rate:  [] 91  Resp:  [17-32] 17  BP: (153-176)/() 153/85    Physical Exam   Constitutional: He is oriented to person, place, and time. He appears well-developed and well-nourished. No distress.   Morbidly obese   HENT:   Head: Normocephalic and atraumatic.   Eyes: Conjunctivae and EOM are normal. Pupils are equal, round, and reactive to light. No scleral icterus.   Neck: Normal range of motion. Neck supple. No JVD present. No tracheal deviation present.   Cardiovascular: Normal rate, regular rhythm, normal heart sounds and intact distal pulses. Exam reveals no gallop.   No murmur heard.  Pulmonary/Chest: Effort normal and breath sounds normal. No respiratory distress. He has no wheezes. He has no rales.   Abdominal: Soft. Bowel sounds are normal. He exhibits no distension. There is no tenderness. There is no guarding.   Musculoskeletal: Normal range of motion. He exhibits no edema.   Neurological: He is alert and oriented to person, place, and time.   No obvious deficits noted.   Skin: Skin is warm and dry. No rash noted. He is not diaphoretic. No erythema. No pallor.   Psychiatric: He has a normal mood and affect. His behavior is normal.   Vitals reviewed.      Assessment:     Active Hospital Problems    Diagnosis   • Abdominal pain   • Constipation   • Orthostatic hypotension   • Obesity, unspecified obesity severity, unspecified obesity type   • HTN (hypertension), benign   • Type 2 diabetes mellitus with hyperglycemia, with long-term current use of insulin (CMS/[facility])   • Chest pain, non-cardiac   • CAD (coronary artery disease)   • CHF (congestive heart failure) (CMS/[facility])       Plan:   1.  Monitor glucose every 6 hours as patient is n.p.o., regular insulin high dose  2.  Home antihypertensives reviewed and restarted as appropriate  3.  Pain management per attending  4.  Labs in a.m.    I discussed the patients findings and my  recommendations with: Ian Ann MD  Time spent: 35 minutes    SUSAN Ash  02/07/19  10:44 PM    I personally evaluated and examined the patient in conjunction with SUSAN Ash, SUSAN and agree with the assessment, treatment plan, and disposition of the patient as recorded by her. My history, exam, and further recommendations are:   Agree with above.      Ian Ann MD  02/21/19  5:25 AM

## 2019-02-09 LAB
ALBUMIN SERPL-MCNC: 4 G/DL (ref 3.5–5)
ALBUMIN/GLOB SERPL: 1.6 G/DL (ref 1.1–2.5)
ALP SERPL-CCNC: 60 U/L (ref 24–120)
ALT SERPL W P-5'-P-CCNC: 21 U/L (ref 0–54)
ANION GAP SERPL CALCULATED.3IONS-SCNC: 9 MMOL/L (ref 4–13)
AST SERPL-CCNC: 31 U/L (ref 7–45)
BILIRUB SERPL-MCNC: 1.1 MG/DL (ref 0.1–1)
BUN BLD-MCNC: 17 MG/DL (ref 5–21)
BUN/CREAT SERPL: 10.5 (ref 7–25)
CALCIUM SPEC-SCNC: 9 MG/DL (ref 8.4–10.4)
CHLORIDE SERPL-SCNC: 97 MMOL/L (ref 98–110)
CO2 SERPL-SCNC: 30 MMOL/L (ref 24–31)
CREAT BLD-MCNC: 1.62 MG/DL (ref 0.5–1.4)
DEPRECATED RDW RBC AUTO: 49.4 FL (ref 40–54)
ERYTHROCYTE [DISTWIDTH] IN BLOOD BY AUTOMATED COUNT: 14.6 % (ref 12–15)
GFR SERPL CREATININE-BSD FRML MDRD: 43 ML/MIN/1.73
GLOBULIN UR ELPH-MCNC: 2.5 GM/DL
GLUCOSE BLD-MCNC: 205 MG/DL (ref 70–100)
GLUCOSE BLDC GLUCOMTR-MCNC: 192 MG/DL (ref 70–130)
GLUCOSE BLDC GLUCOMTR-MCNC: 197 MG/DL (ref 70–130)
GLUCOSE BLDC GLUCOMTR-MCNC: 200 MG/DL (ref 70–130)
GLUCOSE BLDC GLUCOMTR-MCNC: 201 MG/DL (ref 70–130)
GLUCOSE BLDC GLUCOMTR-MCNC: 213 MG/DL (ref 70–130)
HCT VFR BLD AUTO: 36.4 % (ref 40–52)
HGB BLD-MCNC: 12 G/DL (ref 14–18)
MCH RBC QN AUTO: 30.2 PG (ref 28–32)
MCHC RBC AUTO-ENTMCNC: 33 G/DL (ref 33–36)
MCV RBC AUTO: 91.5 FL (ref 82–95)
PLATELET # BLD AUTO: 248 10*3/MM3 (ref 130–400)
PMV BLD AUTO: 10.8 FL (ref 6–12)
POTASSIUM BLD-SCNC: 4.2 MMOL/L (ref 3.5–5.3)
PROT SERPL-MCNC: 6.5 G/DL (ref 6.3–8.7)
RBC # BLD AUTO: 3.98 10*6/MM3 (ref 4.8–5.9)
SODIUM BLD-SCNC: 136 MMOL/L (ref 135–145)
WBC NRBC COR # BLD: 7.08 10*3/MM3 (ref 4.8–10.8)

## 2019-02-09 PROCEDURE — 85027 COMPLETE CBC AUTOMATED: CPT | Performed by: SPECIALIST

## 2019-02-09 PROCEDURE — 99213 OFFICE O/P EST LOW 20 MIN: CPT | Performed by: INTERNAL MEDICINE

## 2019-02-09 PROCEDURE — 96361 HYDRATE IV INFUSION ADD-ON: CPT

## 2019-02-09 PROCEDURE — 63710000001 INSULIN LISPRO (HUMAN) PER 5 UNITS: Performed by: NURSE PRACTITIONER

## 2019-02-09 PROCEDURE — 25010000002 MEROPENEM PER 100 MG: Performed by: SPECIALIST

## 2019-02-09 PROCEDURE — 25010000002 MORPHINE PER 10 MG: Performed by: SPECIALIST

## 2019-02-09 PROCEDURE — 80053 COMPREHEN METABOLIC PANEL: CPT | Performed by: SPECIALIST

## 2019-02-09 PROCEDURE — 96376 TX/PRO/DX INJ SAME DRUG ADON: CPT

## 2019-02-09 PROCEDURE — 96366 THER/PROPH/DIAG IV INF ADDON: CPT

## 2019-02-09 PROCEDURE — 25010000002 ONDANSETRON PER 1 MG: Performed by: SPECIALIST

## 2019-02-09 PROCEDURE — 82962 GLUCOSE BLOOD TEST: CPT

## 2019-02-09 PROCEDURE — 96372 THER/PROPH/DIAG INJ SC/IM: CPT

## 2019-02-09 PROCEDURE — 25010000002 ENOXAPARIN PER 10 MG: Performed by: SPECIALIST

## 2019-02-09 PROCEDURE — G0378 HOSPITAL OBSERVATION PER HR: HCPCS

## 2019-02-09 RX ORDER — SODIUM CHLORIDE 9 MG/ML
50 INJECTION, SOLUTION INTRAVENOUS CONTINUOUS
Status: DISCONTINUED | OUTPATIENT
Start: 2019-02-09 | End: 2019-02-12

## 2019-02-09 RX ADMIN — MORPHINE SULFATE 4 MG: 4 INJECTION, SOLUTION INTRAMUSCULAR; INTRAVENOUS at 21:59

## 2019-02-09 RX ADMIN — SODIUM CHLORIDE, PRESERVATIVE FREE 10 ML: 5 INJECTION INTRAVENOUS at 06:22

## 2019-02-09 RX ADMIN — INSULIN LISPRO 8 UNITS: 100 INJECTION, SOLUTION INTRAVENOUS; SUBCUTANEOUS at 12:20

## 2019-02-09 RX ADMIN — SODIUM CHLORIDE, PRESERVATIVE FREE 10 ML: 5 INJECTION INTRAVENOUS at 01:02

## 2019-02-09 RX ADMIN — MIDODRINE HYDROCHLORIDE 10 MG: 10 TABLET ORAL at 08:08

## 2019-02-09 RX ADMIN — ISOSORBIDE MONONITRATE 30 MG: 30 TABLET, EXTENDED RELEASE ORAL at 08:07

## 2019-02-09 RX ADMIN — MORPHINE SULFATE 4 MG: 4 INJECTION, SOLUTION INTRAMUSCULAR; INTRAVENOUS at 16:57

## 2019-02-09 RX ADMIN — ONDANSETRON 8 MG: 8 TABLET, ORALLY DISINTEGRATING ORAL at 12:40

## 2019-02-09 RX ADMIN — INSULIN LISPRO 4 UNITS: 100 INJECTION, SOLUTION INTRAVENOUS; SUBCUTANEOUS at 17:21

## 2019-02-09 RX ADMIN — MINERAL OIL 360 ML: 999 LIQUID ORAL at 00:32

## 2019-02-09 RX ADMIN — INSULIN LISPRO 8 UNITS: 100 INJECTION, SOLUTION INTRAVENOUS; SUBCUTANEOUS at 08:13

## 2019-02-09 RX ADMIN — ONDANSETRON 8 MG: 2 INJECTION INTRAMUSCULAR; INTRAVENOUS at 17:46

## 2019-02-09 RX ADMIN — ROSUVASTATIN CALCIUM 40 MG: 20 TABLET, FILM COATED ORAL at 08:08

## 2019-02-09 RX ADMIN — ALLOPURINOL 100 MG: 100 TABLET ORAL at 08:08

## 2019-02-09 RX ADMIN — SODIUM CHLORIDE, PRESERVATIVE FREE 3 ML: 5 INJECTION INTRAVENOUS at 08:04

## 2019-02-09 RX ADMIN — MEROPENEM 2 G: 1 INJECTION, POWDER, FOR SOLUTION INTRAVENOUS at 04:47

## 2019-02-09 RX ADMIN — MORPHINE SULFATE 4 MG: 4 INJECTION, SOLUTION INTRAMUSCULAR; INTRAVENOUS at 01:02

## 2019-02-09 RX ADMIN — SODIUM CHLORIDE, PRESERVATIVE FREE 3 ML: 5 INJECTION INTRAVENOUS at 20:23

## 2019-02-09 RX ADMIN — MORPHINE SULFATE 4 MG: 4 INJECTION, SOLUTION INTRAMUSCULAR; INTRAVENOUS at 06:21

## 2019-02-09 RX ADMIN — DULOXETINE HYDROCHLORIDE 60 MG: 30 CAPSULE, DELAYED RELEASE ORAL at 08:06

## 2019-02-09 RX ADMIN — SODIUM CHLORIDE, PRESERVATIVE FREE 10 ML: 5 INJECTION INTRAVENOUS at 04:47

## 2019-02-09 RX ADMIN — MIDODRINE HYDROCHLORIDE 10 MG: 10 TABLET ORAL at 20:22

## 2019-02-09 RX ADMIN — SODIUM CHLORIDE 500 ML: 9 INJECTION, SOLUTION INTRAVENOUS at 11:52

## 2019-02-09 RX ADMIN — MEROPENEM 2 G: 1 INJECTION, POWDER, FOR SOLUTION INTRAVENOUS at 15:50

## 2019-02-09 RX ADMIN — SODIUM CHLORIDE 100 ML/HR: 9 INJECTION, SOLUTION INTRAVENOUS at 12:40

## 2019-02-09 RX ADMIN — POLYETHYLENE GLYCOL 3350 17 G: 17 POWDER, FOR SOLUTION ORAL at 08:06

## 2019-02-09 RX ADMIN — PANTOPRAZOLE SODIUM 40 MG: 40 TABLET, DELAYED RELEASE ORAL at 08:13

## 2019-02-09 RX ADMIN — ENOXAPARIN SODIUM 30 MG: 30 INJECTION SUBCUTANEOUS at 20:22

## 2019-02-09 RX ADMIN — MIDODRINE HYDROCHLORIDE 10 MG: 10 TABLET ORAL at 15:56

## 2019-02-09 RX ADMIN — ENOXAPARIN SODIUM 30 MG: 30 INJECTION SUBCUTANEOUS at 08:14

## 2019-02-09 RX ADMIN — INSULIN LISPRO 8 UNITS: 100 INJECTION, SOLUTION INTRAVENOUS; SUBCUTANEOUS at 20:21

## 2019-02-09 NOTE — PROGRESS NOTES
"Pharmacy Dosing Service  Antimicrobial  Meropenem    Assessment/Action/Plan:  Current order: 2 gm IV every 8 hours.   Pharmacy to dose consult: Possible intra-abdominal infection and morbidly obese patient    CrCl calculation not reliable due to weight and does not accurately reflect GFR in  patient's current clinical status.   Meropenem dosage adjusted to 1 gm IV every 8 hours via extended infusion, which does account for morbidly obese status.  Pharmacy will continue to follow daily.     Subjective:  Erick Luong is currently receiving meropenem for the treatment of IAI, day #2.    Past Medical / Surgical / Social History reviewed.     Objective:  63 y.o. male Ht: 182.9 cm (72\"); Wt: (!) 142 kg (313 lb 9 oz) Body mass index is 42.53 kg/m².  Estimated Creatinine Clearance: <60 mL/min (A) (by C-G formula based on SCr of 1.62 mg/dL (H)).    Lab Results   Component Value Date    BUN 17 02/09/2019    BUN 16 02/08/2019    BUN 13 02/07/2019      Lab Results   Component Value Date    CREATININE 1.62 (H) 02/09/2019    CREATININE 1.41 (H) 02/08/2019    CREATININE 1.40 02/07/2019      Lab Results   Component Value Date    WBC 7.08 02/09/2019    WBC 8.41 02/08/2019    WBC 8.59 02/07/2019        Culture Results:  Microbiology Results (last 10 days)     Procedure Component Value - Date/Time    Blood Culture - Blood, Arm, Right [[id number]] Collected:  02/07/19 1800    Lab Status:  Preliminary result Specimen:  Blood from Arm, Right Updated:  02/08/19 1831     Blood Culture No growth at 24 hours    Blood Culture - Blood, Arm, Left [[id number]] Collected:  02/07/19 1742    Lab Status:  Preliminary result Specimen:  Blood from Arm, Left Updated:  02/08/19 1801     Blood Culture No growth at 24 hours          Felice Dixon, PharmD  02/09/19 1:23 PM      "

## 2019-02-09 NOTE — PLAN OF CARE
Problem: Patient Care Overview  Goal: Plan of Care Review  Outcome: Ongoing (interventions implemented as appropriate)   02/09/19 1403   Coping/Psychosocial   Plan of Care Reviewed With patient   Plan of Care Review   Progress no change   OTHER   Outcome Summary Pt c/o nausea- subling Zofran given but pt still c/o off and on, tolerating IVF, small BM this shift, upgraded to clear liquids but not taking in much, offered MS but so far refused

## 2019-02-09 NOTE — PLAN OF CARE
Problem: Patient Care Overview  Goal: Plan of Care Review  Outcome: Ongoing (interventions implemented as appropriate)   02/08/19 1851   Coping/Psychosocial   Plan of Care Reviewed With patient;spouse   OTHER   Outcome Summary Pt A&O X4. C/O abd pain during shift, pain meds given and glycerin enema given with relief. Large BM today X2. States abd pain is getting better. WIll continue to monitor.      Goal: Individualization and Mutuality  Outcome: Ongoing (interventions implemented as appropriate)    Goal: Discharge Needs Assessment  Outcome: Ongoing (interventions implemented as appropriate)    Goal: Interprofessional Rounds/Family Conf  Outcome: Ongoing (interventions implemented as appropriate)      Problem: Fall Risk (Adult)  Goal: Absence of Fall  Outcome: Ongoing (interventions implemented as appropriate)      Problem: Nausea/Vomiting (Adult)  Goal: Symptom Relief  Outcome: Ongoing (interventions implemented as appropriate)    Goal: Adequate Hydration  Outcome: Ongoing (interventions implemented as appropriate)      Problem: Constipation (Adult)  Goal: Effective Bowel Elimination  Outcome: Ongoing (interventions implemented as appropriate)    Goal: Comfort  Outcome: Ongoing (interventions implemented as appropriate)      Problem: Skin Injury Risk (Adult)  Goal: Identify Related Risk Factors and Signs and Symptoms  Outcome: Ongoing (interventions implemented as appropriate)    Goal: Skin Health and Integrity  Outcome: Ongoing (interventions implemented as appropriate)

## 2019-02-09 NOTE — PROGRESS NOTES
[facility] Medicine Services  INPATIENT PROGRESS NOTE    Length of Stay: 0  Date of Admission: 2/7/2019  Primary Care Physician: Del Shetty MD    Subjective     Chief Complaint:     Consultation for blood pressure and diabetes management    HPI     Patient's abdominal discomfort is significantly improved today.  CT of the abdomen shows that the previously noted mesenteric venous air and portal venous air is resolved but thickening of the ascending and transverse colon was noted possibly secondary to the sequelae of ischemic colitis.  The patient's blood pressure is well controlled.  Blood sugars are reasonably well controlled with sliding scale insulin.  The patient's creatinine has slowly crept up to 1.62 likely secondary to intravascular fluid depletion.  He remains n.p.o. with no IV fluids going.  I will give him a bolus of normal saline and start an IV at 100 cc/h    Review of Systems     All pertinent negatives and positives are as above. All other systems have been reviewed and are negative unless otherwise stated.     Objective    Temp:  [98.1 °F (36.7 °C)-98.3 °F (36.8 °C)] 98.1 °F (36.7 °C)  Heart Rate:  [72-78] 72  Resp:  [16-18] 18  BP: (117-125)/(50-60) 124/50    Lab Results (last 24 hours)     Procedure Component Value Units Date/Time    POC Glucose Once [[id number]]  (Abnormal) Collected:  02/09/19 0806    Specimen:  Blood Updated:  02/09/19 0836     Glucose 201 mg/dL      Comment: : [id number] Mode Baker ID: [id number]       Comprehensive Metabolic Panel [[id number]]  (Abnormal) Collected:  02/09/19 0629    Specimen:  Blood Updated:  02/09/19 0723     Glucose 205 mg/dL      BUN 17 mg/dL      Creatinine 1.62 mg/dL      Sodium 136 mmol/L      Potassium 4.2 mmol/L      Chloride 97 mmol/L      CO2 30.0 mmol/L      Calcium 9.0 mg/dL      Total Protein 6.5 g/dL      Albumin 4.00 g/dL      ALT (SGPT) 21 U/L      AST (SGOT) 31 U/L      Alkaline Phosphatase 60  U/L      Total Bilirubin 1.1 mg/dL      eGFR Non African Amer 43 mL/min/1.73      Globulin 2.5 gm/dL      A/G Ratio 1.6 g/dL      BUN/Creatinine Ratio 10.5     Anion Gap 9.0 mmol/L     CBC (No Diff) [[id number]]  (Abnormal) Collected:  02/09/19 0630    Specimen:  Blood Updated:  02/09/19 0713     WBC 7.08 10*3/mm3      RBC 3.98 10*6/mm3      Hemoglobin 12.0 g/dL      Hematocrit 36.4 %      MCV 91.5 fL      MCH 30.2 pg      MCHC 33.0 g/dL      RDW 14.6 %      RDW-SD 49.4 fl      MPV 10.8 fL      Platelets 248 10*3/mm3     POC Glucose Once [[id number]]  (Abnormal) Collected:  02/09/19 0617    Specimen:  Blood Updated:  02/09/19 0628     Glucose 197 mg/dL      Comment: : [id number] Castleman TamaraMeter ID: [id number]       POC Glucose Once [[id number]]  (Abnormal) Collected:  02/08/19 2211    Specimen:  Blood Updated:  02/08/19 2232     Glucose 180 mg/dL      Comment: : [id number] Castleman TamaraMeter ID: [id number]       Blood Culture - Blood, Arm, Right [[id number]] Collected:  02/07/19 1800    Specimen:  Blood from Arm, Right Updated:  02/08/19 1831     Blood Culture No growth at 24 hours    Blood Culture - Blood, Arm, Left [[id number]] Collected:  02/07/19 1742    Specimen:  Blood from Arm, Left Updated:  02/08/19 1801     Blood Culture No growth at 24 hours    POC Glucose Once [[id number]]  (Abnormal) Collected:  02/08/19 1740    Specimen:  Blood Updated:  02/08/19 1753     Glucose 262 mg/dL      Comment: : [id number] Willam GreyMeter ID: [id number]       Lactic Acid, Plasma [[id number]]  (Normal) Collected:  02/08/19 1329    Specimen:  Blood Updated:  02/08/19 1353     Lactate 1.3 mmol/L     POC Glucose Once [[id number]]  (Abnormal) Collected:  02/08/19 1140    Specimen:  Blood Updated:  02/08/19 1152     Glucose 246 mg/dL      Comment: : [id number] Willam EmilyMeter ID: [id number]             Imaging Results (last 24 hours)     Procedure Component Value Units Date/Time    CT Abdomen Pelvis With  Contrast [[id number]] Collected:  02/08/19 1621     Updated:  02/08/19 1634    Narrative:       EXAMINATION:   CT ABDOMEN PELVIS W CONTRAST-  2/8/2019 4:21 PM CST     CT ABDOMEN PELVIS W CONTRAST- 2/8/2019 2:11 PM CST     HISTORY: Abd pain, gastroenteritis or colitis suspected;  K59.00-Constipation, unspecified; E87.2-Acidosis; R93.5-Abnormal  findings on diagnostic imaging of other abdominal regions, including  retroperitoneum; R10.30-Lower abdominal pain, unspecified       COMPARISON: February 7, 2019     DOSE LENGTH PRODUCT: 1605 mGy cm. Automated exposure control was also  utilized to decrease patient radiation dose.     TECHNIQUE: Following the oral ingestion and intravenous administration  of contrast, helical CT tomographic images of the abdomen and pelvis  were acquired. Multiplanar reformatted images were provided for review.      FINDINGS:   Focal pleural thickening is present in the left chest.     LIVER: Portal venous air was described in the liver prior exam of  February 7. This is markedly improved. No residual portal venous air is  identified.      BILIARY SYSTEM: The gallbladder is surgically absent.      PANCREAS: No focal pancreatic lesion.      SPLEEN: Unremarkable.      KIDNEYS AND ADRENALS: The adrenal glands are visualized. The renal  contours demonstrate symmetric excretion of contrast. A low-density 3 cm  lesion is present posterior cortex the right kidney this may be a cyst  this is unchanged from March 27, 2017.. The ureters are decompressed and  normal in appearance.     RETROPERITONEUM: No mass, lymphadenopathy or hemorrhage.      GI TRACT: There is thickening the wall of the ascending colon. When  compared to prior study March 27 when portal air was present this  thickened wall of the ascending and transverse colon is most consistent  with ischemic colitis..    .     OTHER: There is no mesenteric mass, lymphadenopathy or fluid collection.  Vascular calcifications present in the abdominal  aorta and iliac  arteries.. The osseous structures and soft tissues demonstrate no  worrisome lesions. There is no air in the portal venous system on this  exam.      PELVIS: No mass lesion, fluid collection or significant lymphadenopathy  is seen in the pelvis. The urinary bladder is normal in appearance.       Impression:       1. Previously noted mesenteric venous air and portal venous air is  resolved. However, thickening the wall of the ascending and transverse  colons noted. This is a sequela of ischemic colitis..         This report was finalized on 02/08/2019 16:31 by Dr. Eddie Winter MD.             Intake/Output Summary (Last 24 hours) at 2/9/2019 1148  Last data filed at 2/9/2019 1000  Gross per 24 hour   Intake 780 ml   Output 1300 ml   Net -520 ml       Physical Exam    Constitutional: He is oriented to person, place, and time. He appears well-developed and well-nourished. He is cooperative.   Morbidly obese   HENT:   Head: Normocephalic and atraumatic.   Nose: Nose normal.   Mouth/Throat: Oropharynx is clear and moist.   Eyes: Conjunctivae are normal. No scleral icterus.   Neck: Normal range of motion. Neck supple.   Cardiovascular: Normal rate and regular rhythm.   No murmur heard.  Pulmonary/Chest: Effort normal and breath sounds normal. No respiratory distress.   Abdominal: Soft. He exhibits mild distension.  No tenderness is noted with palpation.  Musculoskeletal: Normal range of motion. He exhibits no edema.   Neurological: He is alert and oriented to person, place, and time. Coordination normal.   Skin: Skin is warm and dry.   Psychiatric: He has a normal mood and affect. His behavior is normal. Judgment and thought content normal.         Results Review:  I have reviewed the labs, radiology results, and diagnostic studies since my last progress note and made treatment changes reflective of the results.   I have reviewed the current medications.    Assessment/Plan     Active Hospital Problems     Diagnosis   • Abdominal pain   • Constipation   • Orthostatic hypotension   • Obesity, unspecified obesity severity, unspecified obesity type   • HTN (hypertension), benign   • Type 2 diabetes mellitus with hyperglycemia, with long-term current use of insulin (CMS/[facility])     hold insulin the am of procedure     • Chest pain, non-cardiac   • CAD (coronary artery disease)   • CHF (congestive heart failure) (CMS/[facility])       PLAN:  Continue sliding scale insulin  500 cc normal saline x1 followed by normal saline at 100 cc/h    Payam Keyes DO   02/09/19   11:48 AM

## 2019-02-09 NOTE — PROGRESS NOTES
"Pharmacy Dosing Service  Anticoagulant  ENOXAPARIN    Assessment/Action/Plan:  Current dosage/labs acceptable. BMI noted; no clear benefit in increasing dosage at this time, particularly with SCr trending up and baseline renal insufficiency. Continue routine follow-up evaluation.     Subjective:  Erick Luong is on ENOXAPARIN 30 mg SQ every 12 hours for indication of VTE prophylaxis.    Objective:  63 y.o. male [Ht: 182.9 cm (72\"); Wt: (!) 142 kg (313 lb 9 oz); BMI: Body mass index is 42.53 kg/m².]  Estimated Creatinine Clearance: 68.9 mL/min (A) (by C-G formula based on SCr of 1.62 mg/dL (H)).   Lab Results   Component Value Date    CREATININE 1.62 (H) 02/09/2019    CREATININE 1.41 (H) 02/08/2019    CREATININE 1.40 02/07/2019     Lab Results   Component Value Date    INR 0.96 02/08/2019    INR 0.85 (L) 01/16/2018    INR 0.85 (L) 05/23/2017    PROTIME 13.1 02/08/2019    PROTIME 11.9 01/16/2018    PROTIME 11.9 05/23/2017      Lab Results   Component Value Date    HGB 12.0 (L) 02/09/2019    HGB 12.3 (L) 02/08/2019    HGB 13.1 (L) 02/07/2019      Lab Results   Component Value Date    HCT 36.4 (L) 02/09/2019    HCT 36.4 (L) 02/08/2019    HCT 38.7 (L) 02/07/2019      Lab Results   Component Value Date     02/09/2019     02/08/2019     02/07/2019       Felice Dixon, PharmD  02/09/19 1:00 PM     "

## 2019-02-09 NOTE — PROGRESS NOTES
"INFECTIOUS DISEASES PROGRESS NOTE    Patient:  Erick Luong  YOB: 1955  MRN: [id number]   Admit date: 2/7/2019   Admitting Physician: Ken Perry MD  Primary Care Physician: Del Shetty MD    Chief Complaint: abdominal pain        Interval History:     Allergies:   Allergies   Allergen Reactions   • Bactrim [Sulfamethoxazole-Trimethoprim] Other (See Comments)     \"RENAL FAILURE\"       Current Meds:     Current Facility-Administered Medications:   •  allopurinol (ZYLOPRIM) tablet 100 mg, 100 mg, Oral, Daily, Raquel Duncan APRN, 100 mg at 02/09/19 0808  •  dextrose (D50W) 25 g/ 50mL Intravenous Solution 25 g, 25 g, Intravenous, Q15 Min PRN, Raquel Duncan APRN  •  dextrose (GLUTOSE) oral gel 15 g, 15 g, Oral, Q15 Min PRN, Raquel Duncan APRN  •  DULoxetine (CYMBALTA) DR capsule 60 mg, 60 mg, Oral, Daily, Payam Keyes DO, 60 mg at 02/09/19 0806  •  enoxaparin (LOVENOX) syringe 30 mg, 30 mg, Subcutaneous, Q12H, Ken Perry MD, 30 mg at 02/09/19 0814  •  glucagon (human recombinant) (GLUCAGEN DIAGNOSTIC) injection 1 mg, 1 mg, Subcutaneous, PRN, Raquel Duncan APRN  •  insulin lispro (humaLOG) injection 0-24 Units, 0-24 Units, Subcutaneous, 4x Daily With Meals & Nightly, Raquel Duncan APRN, 8 Units at 02/09/19 0813  •  isosorbide mononitrate (IMDUR) 24 hr tablet 30 mg, 30 mg, Oral, Daily, Raquel Duncan APRN, 30 mg at 02/09/19 0807  •  meropenem (MERREM) 2 g in sodium chloride 0.9 % 100 mL IVPB, 2 g, Intravenous, Q8H, Ken Perry MD, Last Rate: 0 mL/hr at 02/08/19 2334, 2 g at 02/09/19 0742  •  midodrine (PROAMATINE) tablet 10 mg, 10 mg, Oral, TID With Meals, Raquel Duncan, APRN, 10 mg at 02/09/19 0808  •  Morphine sulfate (PF) injection 4 mg, 4 mg, Intravenous, Q2H PRN, Ken Perry MD, 4 mg at 02/09/19 0621  •  ondansetron (ZOFRAN) 8 mg/50 mL NS IVPB, 8 mg, Intravenous, Q6H PRN, Ken Perry MD, 8 mg at 02/07/19 3322  •  ondansetron " "ODT (ZOFRAN-ODT) disintegrating tablet 8 mg, 8 mg, Oral, Q6H PRN, Ken Perry MD, 8 mg at 19 1031  •  pantoprazole (PROTONIX) EC tablet 40 mg, 40 mg, Oral, Daily, Raquel Duncan, APRN, 40 mg at 19 0813  •  polyethylene glycol (MIRALAX) powder 17 g, 17 g, Oral, Daily, Justyna Barry, APRN, 17 g at 19 0806  •  rosuvastatin (CRESTOR) tablet 40 mg, 40 mg, Oral, Daily, Raquel Duncan APRN, 40 mg at 19 0808  •  sodium chloride 0.9 % flush 1-10 mL, 1-10 mL, Intravenous, PRN, Ken Perry MD, 10 mL at 19 0102  •  sodium chloride 0.9 % flush 10 mL, 10 mL, Intravenous, PRN, Ken Perry MD, 10 mL at 19 0622  •  sodium chloride 0.9 % flush 3 mL, 3 mL, Intravenous, Q12H, Ken Perry MD, 3 mL at 19 0804      Review of Systems   Constitutional: Negative for fever.   Gastrointestinal: Positive for abdominal pain.         Objective     Vital Signs:  Temp (24hrs), Av.3 °F (36.8 °C), Min:98.1 °F (36.7 °C), Max:98.5 °F (36.9 °C)      /50 (BP Location: Right arm, Patient Position: Lying)   Pulse 72   Temp 98.1 °F (36.7 °C) (Oral)   Resp 18   Ht 182.9 cm (72\")   Wt (!) 142 kg (313 lb 9 oz)   SpO2 95%   BMI 42.53 kg/m²         Physical Exam   General: Nontoxic-appearing lying in bed in no acute distress  HEENT: Sclera anicteric and noninjected  Heart very distant heart tones  Respiratory: Effort even and unlabored  Abdomen: Very quiet bowel sounds, fairly soft, no rebound or guarding  Extremities: No pitting edema  Psych: He is pleasant cooperative  Neuro: Alert, oriented, speech is clear, follows commands    Line/IV site: Peripheral l IV forearm left, condition patent and no redness    Results Review:    I reviewed the patient's new clinical results.    Lab Results:  CBC:   Results from last 7 days   Lab 19  1742 19  0631 19  0630   WBC 8.59 8.41 7.08   HEMOGLOBIN 13.1* 12.3* 12.0*   HEMATOCRIT 38.7* 36.4* 36.4*   PLATELETS 260 246 " 248   LYMPHOCYTE %  --  12.2*  --    MONOCYTES %  --  4.1  --          CMP:   Results from last 7 days   Lab 02/07/19  1742 02/08/19  0631 02/09/19  0629   SODIUM 135 136 136   POTASSIUM 4.0 4.2 4.2   CHLORIDE 97* 97* 97*   CO2 24.0 29.0 30.0   BUN 13 16 17   CREATININE 1.40 1.41* 1.62*   CALCIUM 9.2 8.7 9.0   BILIRUBIN 0.9 0.9 1.1*   ALK PHOS 62 59 60   ALT (SGPT) 20 21 21   AST (SGOT) 34 29 31   GLUCOSE 204* 240* 205*         Culture Results:        Microbiology Results Abnormal     Procedure Component Value - Date/Time    Blood Culture - Blood, Arm, Right [[id number]] Collected:  02/07/19 1800    Lab Status:  Preliminary result Specimen:  Blood from Arm, Right Updated:  02/08/19 1831     Blood Culture No growth at 24 hours    Blood Culture - Blood, Arm, Left [[id number]] Collected:  02/07/19 1742    Lab Status:  Preliminary result Specimen:  Blood from Arm, Left Updated:  02/08/19 1801     Blood Culture No growth at 24 hours              Radiology:   CT Abdomen Pelvis With Contrast [[id number]] Orestes as Reviewed   Order Status: Completed Collected: 02/08/19 1621    Updated: 02/08/19 1634   Narrative:     EXAMINATION:   CT ABDOMEN PELVIS W CONTRAST-  2/8/2019 4:21 PM CST     CT ABDOMEN PELVIS W CONTRAST- 2/8/2019 2:11 PM CST     HISTORY: Abd pain, gastroenteritis or colitis suspected;  K59.00-Constipation, unspecified; E87.2-Acidosis; R93.5-Abnormal  findings on diagnostic imaging of other abdominal regions, including  retroperitoneum; R10.30-Lower abdominal pain, unspecified       COMPARISON: February 7, 2019     DOSE LENGTH PRODUCT: 1605 mGy cm. Automated exposure control was also  utilized to decrease patient radiation dose.     TECHNIQUE: Following the oral ingestion and intravenous administration  of contrast, helical CT tomographic images of the abdomen and pelvis  were acquired. Multiplanar reformatted images were provided for review.      FINDINGS:   Focal pleural thickening is present in the left  chest.     LIVER: Portal venous air was described in the liver prior exam of  February 7. This is markedly improved. No residual portal venous air is  identified.      BILIARY SYSTEM: The gallbladder is surgically absent.      PANCREAS: No focal pancreatic lesion.      SPLEEN: Unremarkable.      KIDNEYS AND ADRENALS: The adrenal glands are visualized. The renal  contours demonstrate symmetric excretion of contrast. A low-density 3 cm  lesion is present posterior cortex the right kidney this may be a cyst  this is unchanged from March 27, 2017.. The ureters are decompressed and  normal in appearance.     RETROPERITONEUM: No mass, lymphadenopathy or hemorrhage.      GI TRACT: There is thickening the wall of the ascending colon. When  compared to prior study March 27 when portal air was present this  thickened wall of the ascending and transverse colon is most consistent  with ischemic colitis..    .     OTHER: There is no mesenteric mass, lymphadenopathy or fluid collection.  Vascular calcifications present in the abdominal aorta and iliac  arteries.. The osseous structures and soft tissues demonstrate no  worrisome lesions. There is no air in the portal venous system on this  exam.      PELVIS: No mass lesion, fluid collection or significant lymphadenopathy  is seen in the pelvis. The urinary bladder is normal in appearance.      Impression:     1. Previously noted mesenteric venous air and portal venous air is  resolved. However, thickening the wall of the ascending and transverse  colons noted. This is a sequela of ischemic colitis..         This report was finalized on 02/08/2019 16:31 by Dr. Eddie Winter MD.         Imaging Results (last 72 hours)     Procedure Component Value Units Date/Time    CT Abdomen Pelvis With Contrast [[id number]] Collected:  02/08/19 1621     Updated:  02/08/19 1634    Narrative:       EXAMINATION:   CT ABDOMEN PELVIS W CONTRAST-  2/8/2019 4:21 PM CST     CT ABDOMEN PELVIS W CONTRAST-  2/8/2019 2:11 PM CST     HISTORY: Abd pain, gastroenteritis or colitis suspected;  K59.00-Constipation, unspecified; E87.2-Acidosis; R93.5-Abnormal  findings on diagnostic imaging of other abdominal regions, including  retroperitoneum; R10.30-Lower abdominal pain, unspecified       COMPARISON: February 7, 2019     DOSE LENGTH PRODUCT: 1605 mGy cm. Automated exposure control was also  utilized to decrease patient radiation dose.     TECHNIQUE: Following the oral ingestion and intravenous administration  of contrast, helical CT tomographic images of the abdomen and pelvis  were acquired. Multiplanar reformatted images were provided for review.      FINDINGS:   Focal pleural thickening is present in the left chest.     LIVER: Portal venous air was described in the liver prior exam of  February 7. This is markedly improved. No residual portal venous air is  identified.      BILIARY SYSTEM: The gallbladder is surgically absent.      PANCREAS: No focal pancreatic lesion.      SPLEEN: Unremarkable.      KIDNEYS AND ADRENALS: The adrenal glands are visualized. The renal  contours demonstrate symmetric excretion of contrast. A low-density 3 cm  lesion is present posterior cortex the right kidney this may be a cyst  this is unchanged from March 27, 2017.. The ureters are decompressed and  normal in appearance.     RETROPERITONEUM: No mass, lymphadenopathy or hemorrhage.      GI TRACT: There is thickening the wall of the ascending colon. When  compared to prior study March 27 when portal air was present this  thickened wall of the ascending and transverse colon is most consistent  with ischemic colitis..    .     OTHER: There is no mesenteric mass, lymphadenopathy or fluid collection.  Vascular calcifications present in the abdominal aorta and iliac  arteries.. The osseous structures and soft tissues demonstrate no  worrisome lesions. There is no air in the portal venous system on this  exam.      PELVIS: No mass lesion, fluid  collection or significant lymphadenopathy  is seen in the pelvis. The urinary bladder is normal in appearance.       Impression:       1. Previously noted mesenteric venous air and portal venous air is  resolved. However, thickening the wall of the ascending and transverse  colons noted. This is a sequela of ischemic colitis..         This report was finalized on 02/08/2019 16:31 by Dr. Eddie Winter MD.    CT Abdomen Pelvis Without Contrast [[id number]] Collected:  02/07/19 1942     Updated:  02/07/19 1955    Narrative:       EXAMINATION: CT ABDOMEN PELVIS WO CONTRAST-      2/7/2019 7:11 PM CST     HISTORY: Fall injury. Blunt trauma. Low abdominal pain.     In order to have a CT radiation dose as low as reasonably achievable  Automated Exposure Control was utilized for adjustment of the mA and/or  KV according to patient size.     DLP in mGycm= 1797.     Noncontrast abdomen pelvis CT compared with 1/9/2019.     Normal heart size.  CABG changes with coronary artery calcification.  No acute abnormality at the lung bases.     Trace amounts of air are noted within the hepatic parenchyma in the  subdiaphragmatic region. This has the appearance of intravenous air and  a few mesenteric veins adjacent to the stomach and transverse colon also  show small amounts of air. There is no colonic wall thickening to  indicate ischemia.     No portal vein or superior mesenteric vein area seen.     There is no free fluid.  No bowel dilation.     No pelvic mass or fluid.  There is moderate fecal material throughout the colon.  No diverticulitis or colitis.     Normal and symmetric adrenal glands and kidneys.  3 cm right renal cyst noted.     Summary:  1. Small amounts of mesenteric venous air within the upper abdomen and  small amounts of venous air within the liver. (Has this patient had  recent endoscopy or colonoscopy?)  2. No fluid, mass, or signs of bowel ischemia.                                   This report was finalized on  02/07/2019 19:51 by Dr. Gomez Mcgill MD.    CT Cervical Spine Without Contrast [[id number]] Collected:  02/07/19 1941     Updated:  02/07/19 1945    Narrative:       EXAMINATION: CT CERVICAL SPINE WO CONTRAST-      2/7/2019 7:11 PM CST     HISTORY: Fall injury. Neck pain.     In order to have a CT radiation dose as low as reasonably achievable  Automated Exposure Control was utilized for adjustment of the mA and/or  KV according to patient size.     DLP in mGycm= 253.     Axial, sagittal, and coronal noncontrast CT imaging.     Vertebral bodies and posterior elements are intact.     Reconstructed sagittal images show normal curvature.   There is no malalignment. Prevertebral soft tissues are normal.   Facet joints align normally.     Reconstructed coronal images show normal lateral mass alignment.     C6-7 discectomy with anterior plate and screw fusion.       Impression:       1. No acute bony abnormality.                 This report was finalized on 02/07/2019 19:42 by Dr. Gomez Mcgill MD.    CT Head Without Contrast [[id number]] Collected:  02/07/19 1941     Updated:  02/07/19 1944    Narrative:       EXAMINATION: CT HEAD WO CONTRAST-      2/7/2019 7:11 PM CST     HISTORY: fall, head injury     In order to have a CT radiation dose as low as reasonably achievable  Automated Exposure Control was utilized for adjustment of the mA and/or  KV according to patient size.     DLP in mGycm= 818.     Axial, sagittal, and coronal noncontrast CT imaging of the head.     The visualized paranasal sinuses are clear.     The brain and ventricles have an age appropriate appearance.   There is no hemorrhage or mass-effect.   No acute infarction is seen.     No calvarial abnormality.       Impression:       1. No acute intracranial abnormality is seen.                                         This report was finalized on 02/07/2019 19:41 by Dr. Gomez Mcgill MD.    XR Chest 1 View [[id number]] Collected:  02/07/19 1832     Updated:   02/07/19 1835    Narrative:       EXAMINATION: XR CHEST 1 VW-     2/7/2019 6:16 PM CST     HISTORY: Short of breath.     1 view chest x-ray compared with 1/17/2018.     CABG changes.     Heart size is normal.  The mediastinum is within normal limits.      The lungs are normally expanded with no pneumonia or pneumothorax.       No congestive failure changes.                                                                       Impression:       1. No acute disease.        This report was finalized on 02/07/2019 18:32 by Dr. Gomez Mcgill MD.          Assessment/Plan     Active Hospital Problems    Diagnosis   • Abdominal pain   • Constipation   • Orthostatic hypotension   • Obesity, unspecified obesity severity, unspecified obesity type   • HTN (hypertension), benign   • Type 2 diabetes mellitus with hyperglycemia, with long-term current use of insulin (CMS/[facility])     hold insulin the am of procedure     • Chest pain, non-cardiac   • CAD (coronary artery disease)   • CHF (congestive heart failure) (CMS/[facility])       IMPRESSION:  1. Mid abdominal pain in patient with air in the mesenteric venous system/ liver on initial CT scan.  Etiology was uncertain.  There was concern for translocation of possible gas-forming bacteria given the patient's significant constipation/obstipation.  Follow-up CT scan shows that the  air in the portal vein has completely resolved.  Patient reports his abdominal pain is a little worse today.  2. Thickening of the wall of the ascending and transverse colon-new on follow-up CT scan  3. Constipation-patient has had significant stool output since enemas and CT scan oral contrast.  4. Intermittent sweats-questionable etiology.  Patient without any documented fever here  5. Orthostatic hypotension-worked up as an outpatient by multiple people.  On midodrine.  6. Type 2 diabetes  7. Renal insufficiency-slight worsening.    RECOMMENDATION:   · Continue empiric antibiotic therapy-likely plan very short  course if no other evidence of infection.  · Abdominal pain, wall thickening noted on CT today-defer to surgery.  · Glucose management per hospitalist service  · Will review ertapenem dosing with pharmacy        Julia Adrian MD  02/09/19  8:54 AM

## 2019-02-09 NOTE — PROGRESS NOTES
[facility]  Inpatient Progress Note    Chief Complaint   Patient presents with   • Abdominal Pain       Subjective   Reason for Follow Up: Abdominal pain    Interval History: He tells me he had abdominal discomfort this morning but is feeling okay now.  He describes his discomfort as cramps.  He has had no blood in his stools.  He last had an enema last night.  Everything coming out of his bottom he states is now liquid.  He denies any nausea or vomiting.  There is been no fever chills or sweats  LOS: 0      Current Facility-Administered Medications:   •  allopurinol (ZYLOPRIM) tablet 100 mg, 100 mg, Oral, Daily, Raquel Duncan APRN, 100 mg at 02/09/19 0808  •  dextrose (D50W) 25 g/ 50mL Intravenous Solution 25 g, 25 g, Intravenous, Q15 Min PRN, Raquel Duncan, APRN  •  dextrose (GLUTOSE) oral gel 15 g, 15 g, Oral, Q15 Min PRN, Raquel Duncan, SUSAN  •  DULoxetine (CYMBALTA) DR capsule 60 mg, 60 mg, Oral, Daily, Payam Keyes DO, 60 mg at 02/09/19 0806  •  enoxaparin (LOVENOX) syringe 30 mg, 30 mg, Subcutaneous, Q12H, Ken Perry MD, 30 mg at 02/09/19 0814  •  glucagon (human recombinant) (GLUCAGEN DIAGNOSTIC) injection 1 mg, 1 mg, Subcutaneous, PRN, Raquel Duncan, APRN  •  insulin lispro (humaLOG) injection 0-24 Units, 0-24 Units, Subcutaneous, 4x Daily With Meals & Nightly, Raquel Duncan APRN, 8 Units at 02/09/19 0813  •  isosorbide mononitrate (IMDUR) 24 hr tablet 30 mg, 30 mg, Oral, Daily, Raquel Duncan APRN, 30 mg at 02/09/19 0807  •  [START ON 2/10/2019] meropenem (MERREM) 1 g/100 mL 0.9% NS VTB (mbp), 1 g, Intravenous, Q8H, Julia Adrian MD  •  meropenem (MERREM) 2 g in sodium chloride 0.9 % 100 mL IVPB, 2 g, Intravenous, Q8H, Ken Perry MD  •  midodrine (PROAMATINE) tablet 10 mg, 10 mg, Oral, TID With Meals, Raquel Duncan APRN, 10 mg at 02/09/19 0808  •  Morphine sulfate (PF) injection 4 mg, 4 mg, Intravenous,  Q2H PRN, Ken Perry MD, 4 mg at 02/09/19 0621  •  ondansetron (ZOFRAN) 8 mg/50 mL NS IVPB, 8 mg, Intravenous, Q6H PRN, Ken Perry MD, 8 mg at 02/07/19 2327  •  ondansetron ODT (ZOFRAN-ODT) disintegrating tablet 8 mg, 8 mg, Oral, Q6H PRN, Ken Perry MD, 8 mg at 02/08/19 1031  •  pantoprazole (PROTONIX) EC tablet 40 mg, 40 mg, Oral, Daily, Raquel Duncan, APRN, 40 mg at 02/09/19 0813  •  Pharmacy Consult - Pharmacy to dose, , Does not apply, Continuous PRN, Julia Adrian MD  •  polyethylene glycol (MIRALAX) powder 17 g, 17 g, Oral, Daily, Justyna Barry, APRN, 17 g at 02/09/19 0806  •  rosuvastatin (CRESTOR) tablet 40 mg, 40 mg, Oral, Daily, Raquel Duncan, APRN, 40 mg at 02/09/19 0808  •  sodium chloride 0.9 % bolus 500 mL, 500 mL, Intravenous, Once, Payam Keyes DO  •  sodium chloride 0.9 % flush 1-10 mL, 1-10 mL, Intravenous, PRN, Ken Perry MD, 10 mL at 02/09/19 0102  •  sodium chloride 0.9 % flush 10 mL, 10 mL, Intravenous, PRN, Ken Perry MD, 10 mL at 02/09/19 0622  •  sodium chloride 0.9 % flush 3 mL, 3 mL, Intravenous, Q12H, Ken Perry MD, 3 mL at 02/09/19 0804  •  sodium chloride 0.9 % infusion, 100 mL/hr, Intravenous, Continuous, Payam Keyes DO    Review of Systems   Constitutional: Negative for chills and fever.   Cardiovascular: Negative for chest pain and palpitations.   Gastrointestinal: Negative for abdominal distention, blood in stool, nausea and vomiting.       Objective     Vital Signs  Temp:  [98.1 °F (36.7 °C)-98.5 °F (36.9 °C)] 98.1 °F (36.7 °C)  Heart Rate:  [72-89] 72  Resp:  [16-18] 18  BP: (117-125)/(50-63) 124/50    Intake/Output Summary (Last 24 hours) at 2/9/2019 1057  Last data filed at 2/9/2019 0906  Gross per 24 hour   Intake 880 ml   Output 600 ml   Net 280 ml     I/O this shift:  In: 100 [IV Piggyback:100]  Out: -          Physical Exam   Constitutional: He appears well-developed. No distress.   Pulmonary/Chest:  Effort normal and breath sounds normal.   Abdominal: Soft. Bowel sounds are normal. He exhibits no distension. There is no tenderness. There is no rebound and no guarding.   Obese   Neurological: He is alert.   Psychiatric: He has a normal mood and affect. His behavior is normal.           Results Review:     I reviewed the patient's new clinical results.  Discussed with Dr. Hart.  We looked at the CT scan of the abdomen done yesterday.  There is thickening of the colon in the transverse and ascending area but there are no inflammatory changes.  This portion of the colon is decompressed.  He noted that the SMA and ABIGAIL appeared disease-free.  There is a small calcification in the celiac artery but no noted stenosis.    Lab Results (last 24 hours)     Procedure Component Value Units Date/Time    POC Glucose Once [[id number]]  (Abnormal) Collected:  02/09/19 0806    Specimen:  Blood Updated:  02/09/19 0836     Glucose 201 mg/dL      Comment: : [id number] Mode Baker ID: [id number]       Comprehensive Metabolic Panel [[id number]]  (Abnormal) Collected:  02/09/19 0629    Specimen:  Blood Updated:  02/09/19 0723     Glucose 205 mg/dL      BUN 17 mg/dL      Creatinine 1.62 mg/dL      Sodium 136 mmol/L      Potassium 4.2 mmol/L      Chloride 97 mmol/L      CO2 30.0 mmol/L      Calcium 9.0 mg/dL      Total Protein 6.5 g/dL      Albumin 4.00 g/dL      ALT (SGPT) 21 U/L      AST (SGOT) 31 U/L      Alkaline Phosphatase 60 U/L      Total Bilirubin 1.1 mg/dL      eGFR Non African Amer 43 mL/min/1.73      Globulin 2.5 gm/dL      A/G Ratio 1.6 g/dL      BUN/Creatinine Ratio 10.5     Anion Gap 9.0 mmol/L     CBC (No Diff) [[id number]]  (Abnormal) Collected:  02/09/19 0630    Specimen:  Blood Updated:  02/09/19 0713     WBC 7.08 10*3/mm3      RBC 3.98 10*6/mm3      Hemoglobin 12.0 g/dL      Hematocrit 36.4 %      MCV 91.5 fL      MCH 30.2 pg      MCHC 33.0 g/dL      RDW 14.6 %      RDW-SD 49.4 fl      MPV 10.8 fL       Platelets 248 10*3/mm3     POC Glucose Once [[id number]]  (Abnormal) Collected:  02/09/19 0617    Specimen:  Blood Updated:  02/09/19 0628     Glucose 197 mg/dL      Comment: : [id number] Castleman TamaraMeter ID: [id number]       POC Glucose Once [[id number]]  (Abnormal) Collected:  02/08/19 2211    Specimen:  Blood Updated:  02/08/19 2232     Glucose 180 mg/dL      Comment: : [id number] Castleman TamaraMeter ID: [id number]       Blood Culture - Blood, Arm, Right [[id number]] Collected:  02/07/19 1800    Specimen:  Blood from Arm, Right Updated:  02/08/19 1831     Blood Culture No growth at 24 hours    Blood Culture - Blood, Arm, Left [[id number]] Collected:  02/07/19 1742    Specimen:  Blood from Arm, Left Updated:  02/08/19 1801     Blood Culture No growth at 24 hours    POC Glucose Once [[id number]]  (Abnormal) Collected:  02/08/19 1740    Specimen:  Blood Updated:  02/08/19 1753     Glucose 262 mg/dL      Comment: : [id number] Willam BaughilyMeter ID: [id number]       Lactic Acid, Plasma [[id number]]  (Normal) Collected:  02/08/19 1329    Specimen:  Blood Updated:  02/08/19 1353     Lactate 1.3 mmol/L     POC Glucose Once [[id number]]  (Abnormal) Collected:  02/08/19 1140    Specimen:  Blood Updated:  02/08/19 1152     Glucose 246 mg/dL      Comment: : [id number] SproutelilyMeter ID: [id number]             Radiology:    Imaging Results (last 24 hours)     Procedure Component Value Units Date/Time    CT Abdomen Pelvis With Contrast [[id number]] Collected:  02/08/19 1621     Updated:  02/08/19 1634    Narrative:       EXAMINATION:   CT ABDOMEN PELVIS W CONTRAST-  2/8/2019 4:21 PM CST     CT ABDOMEN PELVIS W CONTRAST- 2/8/2019 2:11 PM CST     HISTORY: Abd pain, gastroenteritis or colitis suspected;  K59.00-Constipation, unspecified; E87.2-Acidosis; R93.5-Abnormal  findings on diagnostic imaging of other abdominal regions, including  retroperitoneum; R10.30-Lower abdominal pain, unspecified        COMPARISON: February 7, 2019     DOSE LENGTH PRODUCT: 1605 mGy cm. Automated exposure control was also  utilized to decrease patient radiation dose.     TECHNIQUE: Following the oral ingestion and intravenous administration  of contrast, helical CT tomographic images of the abdomen and pelvis  were acquired. Multiplanar reformatted images were provided for review.      FINDINGS:   Focal pleural thickening is present in the left chest.     LIVER: Portal venous air was described in the liver prior exam of  February 7. This is markedly improved. No residual portal venous air is  identified.      BILIARY SYSTEM: The gallbladder is surgically absent.      PANCREAS: No focal pancreatic lesion.      SPLEEN: Unremarkable.      KIDNEYS AND ADRENALS: The adrenal glands are visualized. The renal  contours demonstrate symmetric excretion of contrast. A low-density 3 cm  lesion is present posterior cortex the right kidney this may be a cyst  this is unchanged from March 27, 2017.. The ureters are decompressed and  normal in appearance.     RETROPERITONEUM: No mass, lymphadenopathy or hemorrhage.      GI TRACT: There is thickening the wall of the ascending colon. When  compared to prior study March 27 when portal air was present this  thickened wall of the ascending and transverse colon is most consistent  with ischemic colitis..    .     OTHER: There is no mesenteric mass, lymphadenopathy or fluid collection.  Vascular calcifications present in the abdominal aorta and iliac  arteries.. The osseous structures and soft tissues demonstrate no  worrisome lesions. There is no air in the portal venous system on this  exam.      PELVIS: No mass lesion, fluid collection or significant lymphadenopathy  is seen in the pelvis. The urinary bladder is normal in appearance.       Impression:       1. Previously noted mesenteric venous air and portal venous air is  resolved. However, thickening the wall of the ascending and  transverse  colons noted. This is a sequela of ischemic colitis..         This report was finalized on 02/08/2019 16:31 by Dr. Eddie Winter MD.            Assessment/Plan     Patient Active Problem List   Diagnosis Code   • Lower abdominal pain R10.30   • SIRS (systemic inflammatory response syndrome) (CMS/[facility]) R65.10   • Fever, unknown origin R50.9   • Viral gastroenteritis A08.4   • CHF (congestive heart failure) (CMS/[facility]) I50.9   • CAD (coronary artery disease) I25.10   • Diabetes mellitus (CMS/[facility]) E11.9   • Hypertension I10   • Sleep apnea G47.30   • Arthritis M19.90   • Mixed hyperlipidemia E78.2   • Shortness of breath R06.02   • Acute pancreatitis K85.90   • Chest pain, non-cardiac R07.89   • Obesity, unspecified obesity severity, unspecified obesity type E66.9   • HTN (hypertension), benign I10   • Epigastric pain R10.13   • Type 2 diabetes mellitus with hyperglycemia, with long-term current use of insulin (CMS/HCC) E11.65, Z79.4   • Pleuritic chest pain R07.81   • Slow transit constipation K59.01   • Nausea and vomiting R11.2   • Chronic kidney disease N18.9   • Nonsmoker Z78.9   • Orthostatic hypotension I95.1   • Abdominal pain R10.9   • Constipation K59.00       As above, I reviewed the CT with radiology.  The thickening in the right colon would be an unusual spot for ischemic colitis.  On the CT there is no significant mesenteric vascular disease noted.  There is no inflammatory changes around the colon.  His white count is normal.  The colon was decompressed in the right colon, thus more likely the thickening in the right colon secondary to the decompression.    He currently is clinically improved.  Recommend we continue on antibiotic therapy.  Continue supportive care.  Clear liquid diet.  We will reassess tomorrow.      EMR Dragon/transcription disclaimer:  Much of this encounter note is electronic transcription/translation of spoken language to printed text.  The electronic translation of spoken  language may be erroneous, or at times, nonsensical words or phrases may be inadvertently transcribed.  Although I have reviewed the note for such errors, some may still exist.    Tyrell Smith MD  02/09/19  10:57 AM

## 2019-02-09 NOTE — PROGRESS NOTES
Asha Fowler MD Progress Note     LOS: 0 days   Patient Care Team:  Del Shetty MD as PCP - General (Family Medicine)  Juan Chua DO as PCP - Claims Attributed  Emeka Garay MD as Cardiologist (Cardiology)  Cristopher Hannah MD as Consulting Physician (Gastroenterology)  Brian Lomas MD as Consulting Physician (Nephrology)        Subjective     Had a lot of diarrhea through the night and crampy abdominal pain    Objective     Vital Signs  Temp:  [98.1 °F (36.7 °C)-98.5 °F (36.9 °C)] 98.1 °F (36.7 °C)  Heart Rate:  [72-89] 72  Resp:  [16-18] 18  BP: (117-125)/(50-63) 124/50    Intake & Output (last 3 days)       02/06 0701 - 02/07 0700 02/07 0701 - 02/08 0700 02/08 0701 - 02/09 0700 02/09 0701 - 02/10 0700    P.O.   960     IV Piggyback   300     Total Intake(mL/kg)   1260 (8.9)     Urine (mL/kg/hr)   600 (0.2)     Stool   0     Total Output   600     Net   +660             Stool Unmeasured Occurrence   5 x           Physical Exam:     General Appearance:    Alert, cooperative, in no acute distress   Lungs:     respirations regular, even and unlabored    Heart:    Regular rhythm and normal rate, normal S1 and S2, no            murmur, no gallop, no rub   Chest Wall:    No abnormalities observed   Abdomen:      Obese mildly tender upper abdomen   Extremities: No edema, + SCDs   Results Review:     I reviewed the patient's new clinical results.    Lab Results (last 72 hours)     Procedure Component Value Units Date/Time    Comprehensive Metabolic Panel [[id number]]  (Abnormal) Collected:  02/09/19 0629    Specimen:  Blood Updated:  02/09/19 0723     Glucose 205 mg/dL      BUN 17 mg/dL      Creatinine 1.62 mg/dL      Sodium 136 mmol/L      Potassium 4.2 mmol/L      Chloride 97 mmol/L      CO2 30.0 mmol/L      Calcium 9.0 mg/dL      Total Protein 6.5 g/dL      Albumin 4.00 g/dL      ALT (SGPT) 21 U/L      AST (SGOT) 31 U/L      Alkaline Phosphatase 60 U/L      Total Bilirubin 1.1 mg/dL      eGFR  Non  Amer 43 mL/min/1.73      Globulin 2.5 gm/dL      A/G Ratio 1.6 g/dL      BUN/Creatinine Ratio 10.5     Anion Gap 9.0 mmol/L     CBC (No Diff) [[id number]]  (Abnormal) Collected:  02/09/19 0630    Specimen:  Blood Updated:  02/09/19 0713     WBC 7.08 10*3/mm3      RBC 3.98 10*6/mm3      Hemoglobin 12.0 g/dL      Hematocrit 36.4 %      MCV 91.5 fL      MCH 30.2 pg      MCHC 33.0 g/dL      RDW 14.6 %      RDW-SD 49.4 fl      MPV 10.8 fL      Platelets 248 10*3/mm3     POC Glucose Once [[id number]]  (Abnormal) Collected:  02/09/19 0617    Specimen:  Blood Updated:  02/09/19 0628     Glucose 197 mg/dL      Comment: : [id number] [employer] ID: [id number]       POC Glucose Once [[id number]]  (Abnormal) Collected:  02/08/19 2211    Specimen:  Blood Updated:  02/08/19 2232     Glucose 180 mg/dL      Comment: : [id number] [employer] ID: [id number]       Blood Culture - Blood, Arm, Right [[id number]] Collected:  02/07/19 1800    Specimen:  Blood from Arm, Right Updated:  02/08/19 1831     Blood Culture No growth at 24 hours    Blood Culture - Blood, Arm, Left [[id number]] Collected:  02/07/19 1742    Specimen:  Blood from Arm, Left Updated:  02/08/19 1801     Blood Culture No growth at 24 hours    POC Glucose Once [[id number]]  (Abnormal) Collected:  02/08/19 1740    Specimen:  Blood Updated:  02/08/19 1753     Glucose 262 mg/dL      Comment: : [id number] Willam [employer] ID: [id number]       Lactic Acid, Plasma [[id number]]  (Normal) Collected:  02/08/19 1329    Specimen:  Blood Updated:  02/08/19 1353     Lactate 1.3 mmol/L     POC Glucose Once [[id number]]  (Abnormal) Collected:  02/08/19 1140    Specimen:  Blood Updated:  02/08/19 1152     Glucose 246 mg/dL      Comment: : [id number] Willam [employer] ID: [id number]       POC Glucose Once [[id number]]  (Abnormal) Collected:  02/08/19 0858    Specimen:  Blood Updated:  02/08/19 0910     Glucose 253 mg/dL      Comment: :  [id number] Michele Jacobson [facility] ID: [id number]       Hemoglobin A1c [[id number]] Collected:  02/08/19 0631    Specimen:  Blood Updated:  02/08/19 0908     Hemoglobin A1C 8.5 %     Narrative:       Less than 6.0           Non-Diabetic Range  6.0-7.0                 ADA Therapeutic Target  Greater than 7.0        Action Suggested    CBC & Differential [[id number]] Collected:  02/08/19 0631    Specimen:  Blood Updated:  02/08/19 0743    Narrative:       The following orders were created for panel order CBC & Differential.  Procedure                               Abnormality         Status                     ---------                               -----------         ------                     Manual Differential[[id number]]          Abnormal            Final result               CBC Auto Differential[[id number]]        Abnormal            Final result                 Please view results for these tests on the individual orders.    CBC Auto Differential [[id number]]  (Abnormal) Collected:  02/08/19 0631    Specimen:  Blood Updated:  02/08/19 0743     WBC 8.41 10*3/mm3      RBC 4.08 10*6/mm3      Hemoglobin 12.3 g/dL      Hematocrit 36.4 %      MCV 89.2 fL      MCH 30.1 pg      MCHC 33.8 g/dL      RDW 14.5 %      RDW-SD 46.4 fl      MPV 11.2 fL      Platelets 246 10*3/mm3     Manual Differential [[id number]]  (Abnormal) Collected:  02/08/19 0631    Specimen:  Blood Updated:  02/08/19 0743     Neutrophil % 81.6 %      Lymphocyte % 12.2 %      Monocyte % 4.1 %      Eosinophil % 1.0 %      Basophil % 1.0 %      Neutrophils Absolute 6.86 10*3/mm3      Lymphocytes Absolute 1.03 10*3/mm3      Monocytes Absolute 0.34 10*3/mm3      Eosinophils Absolute 0.08 10*3/mm3      Basophils Absolute 0.08 10*3/mm3      RBC Morphology Normal     WBC Morphology Normal     Platelet Morphology Normal    Lipase [[id number]]  (Normal) Collected:  02/08/19 0631    Specimen:  Blood Updated:  02/08/19 0741     Lipase 25 U/L     Amylase [[id number]]   (Normal) Collected:  02/08/19 0631    Specimen:  Blood Updated:  02/08/19 0741     Amylase 53 U/L     Magnesium [[id number]]  (Normal) Collected:  02/08/19 0631    Specimen:  Blood Updated:  02/08/19 0741     Magnesium 2.2 mg/dL     Comprehensive Metabolic Panel [[id number]]  (Abnormal) Collected:  02/08/19 0631    Specimen:  Blood Updated:  02/08/19 0741     Glucose 240 mg/dL      BUN 16 mg/dL      Creatinine 1.41 mg/dL      Sodium 136 mmol/L      Potassium 4.2 mmol/L      Chloride 97 mmol/L      CO2 29.0 mmol/L      Calcium 8.7 mg/dL      Total Protein 6.4 g/dL      Albumin 3.90 g/dL      ALT (SGPT) 21 U/L      AST (SGOT) 29 U/L      Alkaline Phosphatase 59 U/L      Total Bilirubin 0.9 mg/dL      eGFR Non African Amer 51 mL/min/1.73      Globulin 2.5 gm/dL      A/G Ratio 1.6 g/dL      BUN/Creatinine Ratio 11.3     Anion Gap 10.0 mmol/L     Protime-INR [[id number]]  (Normal) Collected:  02/08/19 0631    Specimen:  Blood Updated:  02/08/19 0728     Protime 13.1 Seconds      INR 0.96    Troponin [[id number]]  (Normal) Collected:  02/07/19 2119    Specimen:  Blood Updated:  02/07/19 2152     Troponin I <0.012 ng/mL     Lactic Acid, Reflex [[id number]]  (Normal) Collected:  02/07/19 2119    Specimen:  Blood Updated:  02/07/19 2140     Lactate 1.9 mmol/L     Urinalysis With Culture If Indicated - Urine, Clean Catch [[id number]]  (Abnormal) Collected:  02/07/19 2120    Specimen:  Urine, Clean Catch Updated:  02/07/19 2131     Color, UA Yellow     Appearance, UA Clear     pH, UA <=5.0     Specific Gravity, UA 1.012     Glucose,  mg/dL (Trace)     Ketones, UA Negative     Bilirubin, UA Negative     Blood, UA Negative     Protein, UA Negative     Leuk Esterase, UA Negative     Nitrite, UA Negative     Urobilinogen, UA 1.0 E.U./dL    Narrative:       Urine microscopic not indicated.    Lactic Acid, Reflex Timer (This will reflex a repeat order 3-3:15 hours after ordered.) [[id number]] Collected:  02/07/19 4322    Specimen:   Blood from Arm, Left Updated:  02/07/19 2115     Extra Tube Hold for add-ons.     Comment: Auto resulted.       [place] Draw [[id number]] Collected:  02/07/19 1742    Specimen:  Blood Updated:  02/07/19 1846    Narrative:       The following orders were created for panel order [place] Draw.  Procedure                               Abnormality         Status                     ---------                               -----------         ------                     Light Blue Top[[id number]]                                   Final result               Green Top (Gel)[[id number]]                                  Final result               Lavender Top[[id number]]                                     Final result               Red Top[[id number]]                                          Final result                 Please view results for these tests on the individual orders.    Light Blue Top [[id number]] Collected:  02/07/19 1742    Specimen:  Blood from Arm, Left Updated:  02/07/19 1846     Extra Tube hold for add-on     Comment: Auto resulted       Green Top (Gel) [[id number]] Collected:  02/07/19 1742    Specimen:  Blood from Arm, Left Updated:  02/07/19 1846     Extra Tube Hold for add-ons.     Comment: Auto resulted.       Lavender Top [[id number]] Collected:  02/07/19 1742    Specimen:  Blood from Arm, Left Updated:  02/07/19 1846     Extra Tube hold for add-on     Comment: Auto resulted       Red Top [[id number]] Collected:  02/07/19 1742    Specimen:  Blood from Arm, Left Updated:  02/07/19 1846     Extra Tube Hold for add-ons.     Comment: Auto resulted.       Troponin [[id number]]  (Normal) Collected:  02/07/19 1742    Specimen:  Blood from Arm, Left Updated:  02/07/19 1829     Troponin I <0.012 ng/mL     BNP [[id number]]  (Normal) Collected:  02/07/19 1742    Specimen:  Blood from Arm, Left Updated:  02/07/19 1829     proBNP 574.0 pg/mL     Lipase [[id number]]  (Abnormal) Collected:  02/07/19 1742    Specimen:  Blood  from Arm, Left Updated:  02/07/19 1815     Lipase 16 U/L     Lactic Acid, Plasma [[id number]]  (Abnormal) Collected:  02/07/19 1742    Specimen:  Blood from Arm, Left Updated:  02/07/19 1811     Lactate 3.0 mmol/L     Comprehensive Metabolic Panel [[id number]]  (Abnormal) Collected:  02/07/19 1742    Specimen:  Blood from Arm, Left Updated:  02/07/19 1809     Glucose 204 mg/dL      BUN 13 mg/dL      Creatinine 1.40 mg/dL      Sodium 135 mmol/L      Potassium 4.0 mmol/L      Chloride 97 mmol/L      CO2 24.0 mmol/L      Calcium 9.2 mg/dL      Total Protein 7.1 g/dL      Albumin 4.50 g/dL      ALT (SGPT) 20 U/L      AST (SGOT) 34 U/L      Alkaline Phosphatase 62 U/L      Total Bilirubin 0.9 mg/dL      eGFR Non African Amer 51 mL/min/1.73      Globulin 2.6 gm/dL      A/G Ratio 1.7 g/dL      BUN/Creatinine Ratio 9.3     Anion Gap 14.0 mmol/L     CBC & Differential [[id number]] Collected:  02/07/19 1742    Specimen:  Blood Updated:  02/07/19 1754    Narrative:       The following orders were created for panel order CBC & Differential.  Procedure                               Abnormality         Status                     ---------                               -----------         ------                     CBC Auto Differential[[id number]]        Abnormal            Final result                 Please view results for these tests on the individual orders.    CBC Auto Differential [[id number]]  (Abnormal) Collected:  02/07/19 1742    Specimen:  Blood from Arm, Left Updated:  02/07/19 1754     WBC 8.59 10*3/mm3      RBC 4.33 10*6/mm3      Hemoglobin 13.1 g/dL      Hematocrit 38.7 %      MCV 89.4 fL      MCH 30.3 pg      MCHC 33.9 g/dL      RDW 14.4 %      RDW-SD 46.5 fl      MPV 10.6 fL      Platelets 260 10*3/mm3      Neutrophil % 73.7 %      Lymphocyte % 13.3 %      Monocyte % 8.3 %      Eosinophil % 3.8 %      Basophil % 0.6 %      Immature Grans % 0.3 %      Neutrophils, Absolute 6.33 10*3/mm3      Lymphocytes, Absolute 1.14  10*3/mm3      Monocytes, Absolute 0.71 10*3/mm3      Eosinophils, Absolute 0.33 10*3/mm3      Basophils, Absolute 0.05 10*3/mm3      Immature Grans, Absolute 0.03 10*3/mm3      nRBC 0.0 /100 WBC         Imaging Results (last 72 hours)     Procedure Component Value Units Date/Time    CT Abdomen Pelvis With Contrast [[id number]] Collected:  02/08/19 1621     Updated:  02/08/19 1634    Narrative:       EXAMINATION:   CT ABDOMEN PELVIS W CONTRAST-  2/8/2019 4:21 PM CST     CT ABDOMEN PELVIS W CONTRAST- 2/8/2019 2:11 PM CST     HISTORY: Abd pain, gastroenteritis or colitis suspected;  K59.00-Constipation, unspecified; E87.2-Acidosis; R93.5-Abnormal  findings on diagnostic imaging of other abdominal regions, including  retroperitoneum; R10.30-Lower abdominal pain, unspecified       COMPARISON: February 7, 2019     DOSE LENGTH PRODUCT: 1605 mGy cm. Automated exposure control was also  utilized to decrease patient radiation dose.     TECHNIQUE: Following the oral ingestion and intravenous administration  of contrast, helical CT tomographic images of the abdomen and pelvis  were acquired. Multiplanar reformatted images were provided for review.      FINDINGS:   Focal pleural thickening is present in the left chest.     LIVER: Portal venous air was described in the liver prior exam of  February 7. This is markedly improved. No residual portal venous air is  identified.      BILIARY SYSTEM: The gallbladder is surgically absent.      PANCREAS: No focal pancreatic lesion.      SPLEEN: Unremarkable.      KIDNEYS AND ADRENALS: The adrenal glands are visualized. The renal  contours demonstrate symmetric excretion of contrast. A low-density 3 cm  lesion is present posterior cortex the right kidney this may be a cyst  this is unchanged from March 27, 2017.. The ureters are decompressed and  normal in appearance.     RETROPERITONEUM: No mass, lymphadenopathy or hemorrhage.      GI TRACT: There is thickening the wall of the ascending  colon. When  compared to prior study March 27 when portal air was present this  thickened wall of the ascending and transverse colon is most consistent  with ischemic colitis..    .     OTHER: There is no mesenteric mass, lymphadenopathy or fluid collection.  Vascular calcifications present in the abdominal aorta and iliac  arteries.. The osseous structures and soft tissues demonstrate no  worrisome lesions. There is no air in the portal venous system on this  exam.      PELVIS: No mass lesion, fluid collection or significant lymphadenopathy  is seen in the pelvis. The urinary bladder is normal in appearance.       Impression:       1. Previously noted mesenteric venous air and portal venous air is  resolved. However, thickening the wall of the ascending and transverse  colons noted. This is a sequela of ischemic colitis..         This report was finalized on 02/08/2019 16:31 by Dr. Eddie Winter MD.    CT Abdomen Pelvis Without Contrast [[id number]] Collected:  02/07/19 1942     Updated:  02/07/19 1955    Narrative:       EXAMINATION: CT ABDOMEN PELVIS WO CONTRAST-      2/7/2019 7:11 PM CST     HISTORY: Fall injury. Blunt trauma. Low abdominal pain.     In order to have a CT radiation dose as low as reasonably achievable  Automated Exposure Control was utilized for adjustment of the mA and/or  KV according to patient size.     DLP in mGycm= 1797.     Noncontrast abdomen pelvis CT compared with 1/9/2019.     Normal heart size.  CABG changes with coronary artery calcification.  No acute abnormality at the lung bases.     Trace amounts of air are noted within the hepatic parenchyma in the  subdiaphragmatic region. This has the appearance of intravenous air and  a few mesenteric veins adjacent to the stomach and transverse colon also  show small amounts of air. There is no colonic wall thickening to  indicate ischemia.     No portal vein or superior mesenteric vein area seen.     There is no free fluid.  No bowel  dilation.     No pelvic mass or fluid.  There is moderate fecal material throughout the colon.  No diverticulitis or colitis.     Normal and symmetric adrenal glands and kidneys.  3 cm right renal cyst noted.     Summary:  1. Small amounts of mesenteric venous air within the upper abdomen and  small amounts of venous air within the liver. (Has this patient had  recent endoscopy or colonoscopy?)  2. No fluid, mass, or signs of bowel ischemia.                                   This report was finalized on 02/07/2019 19:51 by Dr. Gomez Mcgill MD.    CT Cervical Spine Without Contrast [[id number]] Collected:  02/07/19 1941     Updated:  02/07/19 1945    Narrative:       EXAMINATION: CT CERVICAL SPINE WO CONTRAST-      2/7/2019 7:11 PM CST     HISTORY: Fall injury. Neck pain.     In order to have a CT radiation dose as low as reasonably achievable  Automated Exposure Control was utilized for adjustment of the mA and/or  KV according to patient size.     DLP in mGycm= 253.     Axial, sagittal, and coronal noncontrast CT imaging.     Vertebral bodies and posterior elements are intact.     Reconstructed sagittal images show normal curvature.   There is no malalignment. Prevertebral soft tissues are normal.   Facet joints align normally.     Reconstructed coronal images show normal lateral mass alignment.     C6-7 discectomy with anterior plate and screw fusion.       Impression:       1. No acute bony abnormality.                                         This report was finalized on 02/07/2019 19:42 by Dr. Gomez Mcgill MD.    CT Head Without Contrast [[id number]] Collected:  02/07/19 1941     Updated:  02/07/19 1944    Narrative:       EXAMINATION: CT HEAD WO CONTRAST-      2/7/2019 7:11 PM CST     HISTORY: fall, head injury     In order to have a CT radiation dose as low as reasonably achievable  Automated Exposure Control was utilized for adjustment of the mA and/or  KV according to patient size.     DLP in mGycm= 818.      Axial, sagittal, and coronal noncontrast CT imaging of the head.     The visualized paranasal sinuses are clear.     The brain and ventricles have an age appropriate appearance.   There is no hemorrhage or mass-effect.   No acute infarction is seen.     No calvarial abnormality.       Impression:       1. No acute intracranial abnormality is seen.                                         This report was finalized on 02/07/2019 19:41 by Dr. Gomez Mcgill MD.    XR Chest 1 View [[id number]] Collected:  02/07/19 1832     Updated:  02/07/19 1835    Narrative:       EXAMINATION: XR CHEST 1 VW-     2/7/2019 6:16 PM CST     HISTORY: Short of breath.     1 view chest x-ray compared with 1/17/2018.     CABG changes.     Heart size is normal.  The mediastinum is within normal limits.      The lungs are normally expanded with no pneumonia or pneumothorax.       No congestive failure changes.                                                                       Impression:       1. No acute disease.        This report was finalized on 02/07/2019 18:32 by Dr. Gomez Mcgill MD.              Assessment/Plan       CHF (congestive heart failure) (CMS/HCC)    CAD (coronary artery disease)    Chest pain, non-cardiac    Obesity, unspecified obesity severity, unspecified obesity type    HTN (hypertension), benign    Type 2 diabetes mellitus with hyperglycemia, with long-term current use of insulin (CMS/HCC)    Orthostatic hypotension    Abdominal pain    Constipation      Continue antibiotics      Asha Fowler MD  02/09/19  7:57 AM

## 2019-02-09 NOTE — PLAN OF CARE
Problem: Patient Care Overview  Goal: Plan of Care Review  Outcome: Ongoing (interventions implemented as appropriate)   02/08/19 0541 02/1955 02/09/19 0539   Coping/Psychosocial   Plan of Care Reviewed With --  patient --    Plan of Care Review   Progress no change --  --    OTHER   Outcome Summary --  --  Enema given last night, with good results. Incontinent of large liquid to soft stool. States abd pain is improving. Medicated for c/o headache and neck pain with prn Morphine, with relief noted. . vss. Safety maintained. NPO.       Problem: Fall Risk (Adult)  Goal: Absence of Fall  Outcome: Ongoing (interventions implemented as appropriate)      Problem: Nausea/Vomiting (Adult)  Goal: Symptom Relief  Outcome: Ongoing (interventions implemented as appropriate)    Goal: Adequate Hydration  Outcome: Ongoing (interventions implemented as appropriate)      Problem: Constipation (Adult)  Goal: Effective Bowel Elimination  Outcome: Ongoing (interventions implemented as appropriate)    Goal: Comfort  Outcome: Ongoing (interventions implemented as appropriate)      Problem: Skin Injury Risk (Adult)  Goal: Identify Related Risk Factors and Signs and Symptoms  Outcome: Ongoing (interventions implemented as appropriate)    Goal: Skin Health and Integrity  Outcome: Ongoing (interventions implemented as appropriate)

## 2019-02-10 LAB
ANION GAP SERPL CALCULATED.3IONS-SCNC: 8 MMOL/L (ref 4–13)
BASOPHILS # BLD AUTO: 0.05 10*3/MM3 (ref 0–0.2)
BASOPHILS NFR BLD AUTO: 0.7 % (ref 0–2)
BUN BLD-MCNC: 14 MG/DL (ref 5–21)
BUN/CREAT SERPL: 10.2 (ref 7–25)
CALCIUM SPEC-SCNC: 8.6 MG/DL (ref 8.4–10.4)
CHLORIDE SERPL-SCNC: 100 MMOL/L (ref 98–110)
CO2 SERPL-SCNC: 28 MMOL/L (ref 24–31)
CREAT BLD-MCNC: 1.37 MG/DL (ref 0.5–1.4)
CRP SERPL-MCNC: 1.44 MG/DL (ref 0–0.99)
DEPRECATED RDW RBC AUTO: 47.3 FL (ref 40–54)
EOSINOPHIL # BLD AUTO: 0.47 10*3/MM3 (ref 0–0.7)
EOSINOPHIL NFR BLD AUTO: 6.7 % (ref 0–4)
ERYTHROCYTE [DISTWIDTH] IN BLOOD BY AUTOMATED COUNT: 14.3 % (ref 12–15)
GFR SERPL CREATININE-BSD FRML MDRD: 53 ML/MIN/1.73
GLUCOSE BLD-MCNC: 148 MG/DL (ref 70–100)
GLUCOSE BLDC GLUCOMTR-MCNC: 183 MG/DL (ref 70–130)
GLUCOSE BLDC GLUCOMTR-MCNC: 186 MG/DL (ref 70–130)
GLUCOSE BLDC GLUCOMTR-MCNC: 190 MG/DL (ref 70–130)
GLUCOSE BLDC GLUCOMTR-MCNC: 199 MG/DL (ref 70–130)
HCT VFR BLD AUTO: 32.6 % (ref 40–52)
HGB BLD-MCNC: 10.9 G/DL (ref 14–18)
IMM GRANULOCYTES # BLD AUTO: 0.03 10*3/MM3 (ref 0–0.03)
IMM GRANULOCYTES NFR BLD AUTO: 0.4 % (ref 0–5)
LYMPHOCYTES # BLD AUTO: 1.56 10*3/MM3 (ref 0.72–4.86)
LYMPHOCYTES NFR BLD AUTO: 22.1 % (ref 15–45)
MCH RBC QN AUTO: 30.3 PG (ref 28–32)
MCHC RBC AUTO-ENTMCNC: 33.4 G/DL (ref 33–36)
MCV RBC AUTO: 90.6 FL (ref 82–95)
MONOCYTES # BLD AUTO: 0.64 10*3/MM3 (ref 0.19–1.3)
MONOCYTES NFR BLD AUTO: 9.1 % (ref 4–12)
NEUTROPHILS # BLD AUTO: 4.31 10*3/MM3 (ref 1.87–8.4)
NEUTROPHILS NFR BLD AUTO: 61 % (ref 39–78)
NRBC BLD AUTO-RTO: 0 /100 WBC (ref 0–0)
PLATELET # BLD AUTO: 232 10*3/MM3 (ref 130–400)
PMV BLD AUTO: 10.6 FL (ref 6–12)
POTASSIUM BLD-SCNC: 4.1 MMOL/L (ref 3.5–5.3)
RBC # BLD AUTO: 3.6 10*6/MM3 (ref 4.8–5.9)
SODIUM BLD-SCNC: 136 MMOL/L (ref 135–145)
WBC NRBC COR # BLD: 7.06 10*3/MM3 (ref 4.8–10.8)

## 2019-02-10 PROCEDURE — 96376 TX/PRO/DX INJ SAME DRUG ADON: CPT

## 2019-02-10 PROCEDURE — 63710000001 INSULIN LISPRO (HUMAN) PER 5 UNITS: Performed by: NURSE PRACTITIONER

## 2019-02-10 PROCEDURE — 82962 GLUCOSE BLOOD TEST: CPT

## 2019-02-10 PROCEDURE — 96372 THER/PROPH/DIAG INJ SC/IM: CPT

## 2019-02-10 PROCEDURE — 80048 BASIC METABOLIC PNL TOTAL CA: CPT | Performed by: INTERNAL MEDICINE

## 2019-02-10 PROCEDURE — 96366 THER/PROPH/DIAG IV INF ADDON: CPT

## 2019-02-10 PROCEDURE — 25010000002 MORPHINE PER 10 MG: Performed by: SPECIALIST

## 2019-02-10 PROCEDURE — 85025 COMPLETE CBC W/AUTO DIFF WBC: CPT | Performed by: INTERNAL MEDICINE

## 2019-02-10 PROCEDURE — 99213 OFFICE O/P EST LOW 20 MIN: CPT | Performed by: INTERNAL MEDICINE

## 2019-02-10 PROCEDURE — G0378 HOSPITAL OBSERVATION PER HR: HCPCS

## 2019-02-10 PROCEDURE — 86140 C-REACTIVE PROTEIN: CPT | Performed by: INTERNAL MEDICINE

## 2019-02-10 PROCEDURE — 25010000002 MEROPENEM PER 100 MG: Performed by: INTERNAL MEDICINE

## 2019-02-10 PROCEDURE — 96361 HYDRATE IV INFUSION ADD-ON: CPT

## 2019-02-10 PROCEDURE — 25010000002 ENOXAPARIN PER 10 MG: Performed by: SPECIALIST

## 2019-02-10 RX ORDER — MAGNESIUM CARB/ALUMINUM HYDROX 105-160MG
296 TABLET,CHEWABLE ORAL ONCE
Status: COMPLETED | OUTPATIENT
Start: 2019-02-10 | End: 2019-02-10

## 2019-02-10 RX ORDER — MAGNESIUM CARB/ALUMINUM HYDROX 105-160MG
296 TABLET,CHEWABLE ORAL ONCE
Status: COMPLETED | OUTPATIENT
Start: 2019-02-11 | End: 2019-02-11

## 2019-02-10 RX ADMIN — SODIUM CHLORIDE 100 ML/HR: 9 INJECTION, SOLUTION INTRAVENOUS at 14:44

## 2019-02-10 RX ADMIN — INSULIN LISPRO 4 UNITS: 100 INJECTION, SOLUTION INTRAVENOUS; SUBCUTANEOUS at 08:04

## 2019-02-10 RX ADMIN — MEROPENEM 1 G: 1 INJECTION, POWDER, FOR SOLUTION INTRAVENOUS at 08:05

## 2019-02-10 RX ADMIN — SODIUM CHLORIDE, PRESERVATIVE FREE 3 ML: 5 INJECTION INTRAVENOUS at 08:07

## 2019-02-10 RX ADMIN — SODIUM CHLORIDE 100 ML/HR: 9 INJECTION, SOLUTION INTRAVENOUS at 01:33

## 2019-02-10 RX ADMIN — POLYETHYLENE GLYCOL 3350 17 G: 17 POWDER, FOR SOLUTION ORAL at 08:05

## 2019-02-10 RX ADMIN — MORPHINE SULFATE 4 MG: 4 INJECTION, SOLUTION INTRAMUSCULAR; INTRAVENOUS at 09:38

## 2019-02-10 RX ADMIN — MIDODRINE HYDROCHLORIDE 10 MG: 10 TABLET ORAL at 08:05

## 2019-02-10 RX ADMIN — MIDODRINE HYDROCHLORIDE 10 MG: 10 TABLET ORAL at 11:23

## 2019-02-10 RX ADMIN — PANTOPRAZOLE SODIUM 40 MG: 40 TABLET, DELAYED RELEASE ORAL at 08:04

## 2019-02-10 RX ADMIN — ONDANSETRON 8 MG: 8 TABLET, ORALLY DISINTEGRATING ORAL at 18:15

## 2019-02-10 RX ADMIN — INSULIN LISPRO 4 UNITS: 100 INJECTION, SOLUTION INTRAVENOUS; SUBCUTANEOUS at 17:16

## 2019-02-10 RX ADMIN — ISOSORBIDE MONONITRATE 30 MG: 30 TABLET, EXTENDED RELEASE ORAL at 08:06

## 2019-02-10 RX ADMIN — INSULIN LISPRO 4 UNITS: 100 INJECTION, SOLUTION INTRAVENOUS; SUBCUTANEOUS at 20:21

## 2019-02-10 RX ADMIN — DULOXETINE HYDROCHLORIDE 60 MG: 30 CAPSULE, DELAYED RELEASE ORAL at 08:06

## 2019-02-10 RX ADMIN — MIDODRINE HYDROCHLORIDE 10 MG: 10 TABLET ORAL at 17:16

## 2019-02-10 RX ADMIN — MEROPENEM 1 G: 1 INJECTION, POWDER, FOR SOLUTION INTRAVENOUS at 00:11

## 2019-02-10 RX ADMIN — INSULIN LISPRO 4 UNITS: 100 INJECTION, SOLUTION INTRAVENOUS; SUBCUTANEOUS at 11:25

## 2019-02-10 RX ADMIN — MORPHINE SULFATE 4 MG: 4 INJECTION, SOLUTION INTRAMUSCULAR; INTRAVENOUS at 22:55

## 2019-02-10 RX ADMIN — ROSUVASTATIN CALCIUM 40 MG: 20 TABLET, FILM COATED ORAL at 08:06

## 2019-02-10 RX ADMIN — MEROPENEM 1 G: 1 INJECTION, POWDER, FOR SOLUTION INTRAVENOUS at 15:30

## 2019-02-10 RX ADMIN — Medication 296 ML: at 17:46

## 2019-02-10 RX ADMIN — MORPHINE SULFATE 4 MG: 4 INJECTION, SOLUTION INTRAMUSCULAR; INTRAVENOUS at 11:26

## 2019-02-10 RX ADMIN — MORPHINE SULFATE 4 MG: 4 INJECTION, SOLUTION INTRAMUSCULAR; INTRAVENOUS at 01:59

## 2019-02-10 RX ADMIN — ENOXAPARIN SODIUM 30 MG: 30 INJECTION SUBCUTANEOUS at 08:04

## 2019-02-10 NOTE — H&P (VIEW-ONLY)
[facility]astroenterology  Inpatient Progress Note    Chief Complaint   Patient presents with   • Abdominal Pain       Subjective   Reason for Follow Up: Abdominal pain    Interval History: He notes abdominal cramping today.  He had cramps yesterday.  He states the pain is not severe.  He did have some liquid stools yesterday but none today.  He denies any nausea or vomiting.  There is been no fever chills or sweats.     LOS: 0      Current Facility-Administered Medications:   •  dextrose (D50W) 25 g/ 50mL Intravenous Solution 25 g, 25 g, Intravenous, Q15 Min PRN, Raquel Duncan APRN  •  dextrose (GLUTOSE) oral gel 15 g, 15 g, Oral, Q15 Min PRN, Raquel Duncan APRN  •  DULoxetine (CYMBALTA) DR capsule 60 mg, 60 mg, Oral, Daily, Payam Keyes DO, 60 mg at 02/10/19 0806  •  enoxaparin (LOVENOX) syringe 30 mg, 30 mg, Subcutaneous, Q12H, Ken Perry MD, 30 mg at 02/10/19 0804  •  glucagon (human recombinant) (GLUCAGEN DIAGNOSTIC) injection 1 mg, 1 mg, Subcutaneous, PRN, Raquel Duncan APRN  •  insulin lispro (humaLOG) injection 0-24 Units, 0-24 Units, Subcutaneous, 4x Daily With Meals & Nightly, Raquel Duncan APRN, 4 Units at 02/10/19 0804  •  isosorbide mononitrate (IMDUR) 24 hr tablet 30 mg, 30 mg, Oral, Daily, Raquel Duncan APRN, 30 mg at 02/10/19 0806  •  meropenem (MERREM) 1 g/100 mL 0.9% NS VTB (mbp), 1 g, Intravenous, Q8H, Julia Adrian MD, Last Rate: 0 mL/hr at 02/10/19 0315, 1 g at 02/10/19 1007  •  midodrine (PROAMATINE) tablet 10 mg, 10 mg, Oral, TID With Meals, Raquel Duncan APRN, 10 mg at 02/10/19 0805  •  Morphine sulfate (PF) injection 4 mg, 4 mg, Intravenous, Q2H PRN, Ken Perry MD, 4 mg at 02/10/19 0938  •  ondansetron (ZOFRAN) 8 mg/50 mL NS IVPB, 8 mg, Intravenous, Q6H PRN, Ken Perry MD, 8 mg at 02/09/19 1746  •  ondansetron ODT (ZOFRAN-ODT) disintegrating tablet 8 mg, 8 mg, Oral, Q6H PRN, Ken Perry MD, 8  mg at 02/09/19 1240  •  pantoprazole (PROTONIX) EC tablet 40 mg, 40 mg, Oral, Daily, Raquel Duncan APRSHERRILL, 40 mg at 02/10/19 0804  •  Pharmacy Consult - Pharmacy to dose, , Does not apply, Continuous PRN, Julia Adrian MD  •  polyethylene glycol (MIRALAX) powder 17 g, 17 g, Oral, Daily, Justyna Barry, APRSHERRILL, 17 g at 02/10/19 0805  •  rosuvastatin (CRESTOR) tablet 40 mg, 40 mg, Oral, Daily, Raquel Duncan APRN, 40 mg at 02/10/19 0806  •  sodium chloride 0.9 % flush 1-10 mL, 1-10 mL, Intravenous, PRN, Ken Perry MD, 10 mL at 02/09/19 0102  •  sodium chloride 0.9 % flush 10 mL, 10 mL, Intravenous, PRN, Ken Perry MD, 10 mL at 02/09/19 0622  •  sodium chloride 0.9 % flush 3 mL, 3 mL, Intravenous, Q12H, Ken Perry MD, 3 mL at 02/10/19 0807  •  sodium chloride 0.9 % infusion, 100 mL/hr, Intravenous, Continuous, Payam Keyes DO, Last Rate: 100 mL/hr at 02/10/19 0910, 100 mL/hr at 02/10/19 0910    Review of Systems   Constitutional: Negative for chills and fever.   Cardiovascular: Negative for chest pain and palpitations.   Gastrointestinal: Negative for abdominal distention, blood in stool, nausea and vomiting.       Objective     Vital Signs  Temp:  [97.8 °F (36.6 °C)-98 °F (36.7 °C)] 97.9 °F (36.6 °C)  Heart Rate:  [67-78] 72  Resp:  [18-24] 18  BP: (126-152)/(50-72) 128/57    Intake/Output Summary (Last 24 hours) at 2/10/2019 1057  Last data filed at 2/10/2019 0315  Gross per 24 hour   Intake 1622 ml   Output 625 ml   Net 997 ml     No intake/output data recorded.         Physical Exam   Constitutional: He appears well-developed. No distress.   Pulmonary/Chest: Effort normal and breath sounds normal.   Abdominal: Soft. Bowel sounds are normal. He exhibits no distension. There is no tenderness. There is no rebound and no guarding.   Obese   Neurological: He is alert.   Psychiatric: He has a normal mood and affect. His behavior is normal.           Results Review:     I  reviewed the patient's new clinical results.  Discussed with Dr. Hart.  We looked at the CT scan of the abdomen done yesterday.  There is thickening of the colon in the transverse and ascending area but there are no inflammatory changes.  This portion of the colon is decompressed.  He noted that the SMA and ABIGAIL appeared disease-free.  There is a small calcification in the celiac artery but no noted stenosis.    Lab Results (last 24 hours)     Procedure Component Value Units Date/Time    POC Glucose Once [[id number]]  (Abnormal) Collected:  02/10/19 0801    Specimen:  Blood Updated:  02/10/19 0820     Glucose 183 mg/dL      Comment: : [id number] Hard KatyMeter ID: [id number]       Basic Metabolic Panel [[id number]]  (Abnormal) Collected:  02/10/19 0602    Specimen:  Blood Updated:  02/10/19 0708     Glucose 148 mg/dL      BUN 14 mg/dL      Creatinine 1.37 mg/dL      Sodium 136 mmol/L      Potassium 4.1 mmol/L      Chloride 100 mmol/L      CO2 28.0 mmol/L      Calcium 8.6 mg/dL      eGFR Non African Amer 53 mL/min/1.73      BUN/Creatinine Ratio 10.2     Anion Gap 8.0 mmol/L     Narrative:       GFR Normal >60  Chronic Kidney Disease <60  Kidney Failure <15    C-reactive Protein [[id number]]  (Abnormal) Collected:  02/10/19 0602    Specimen:  Blood Updated:  02/10/19 0708     C-Reactive Protein 1.44 mg/dL     CBC & Differential [[id number]] Collected:  02/10/19 0602    Specimen:  Blood Updated:  02/10/19 0646    Narrative:       The following orders were created for panel order CBC & Differential.  Procedure                               Abnormality         Status                     ---------                               -----------         ------                     CBC Auto Differential[[id number]]        Abnormal            Final result                 Please view results for these tests on the individual orders.    CBC Auto Differential [[id number]]  (Abnormal) Collected:  02/10/19 0602    Specimen:  Blood  Updated:  02/10/19 0646     WBC 7.06 10*3/mm3      RBC 3.60 10*6/mm3      Hemoglobin 10.9 g/dL      Hematocrit 32.6 %      MCV 90.6 fL      MCH 30.3 pg      MCHC 33.4 g/dL      RDW 14.3 %      RDW-SD 47.3 fl      MPV 10.6 fL      Platelets 232 10*3/mm3      Neutrophil % 61.0 %      Lymphocyte % 22.1 %      Monocyte % 9.1 %      Eosinophil % 6.7 %      Basophil % 0.7 %      Immature Grans % 0.4 %      Neutrophils, Absolute 4.31 10*3/mm3      Lymphocytes, Absolute 1.56 10*3/mm3      Monocytes, Absolute 0.64 10*3/mm3      Eosinophils, Absolute 0.47 10*3/mm3      Basophils, Absolute 0.05 10*3/mm3      Immature Grans, Absolute 0.03 10*3/mm3      nRBC 0.0 /100 WBC     POC Glucose Once [[id number]]  (Abnormal) Collected:  02/09/19 2017    Specimen:  Blood Updated:  02/09/19 2106     Glucose 213 mg/dL      Comment: : [id number] Francoise Mcmauns ID: [id number]       Blood Culture - Blood, Arm, Right [[id number]] Collected:  02/07/19 1800    Specimen:  Blood from Arm, Right Updated:  02/09/19 1830     Blood Culture No growth at 2 days    Blood Culture - Blood, Arm, Left [[id number]] Collected:  02/07/19 1742    Specimen:  Blood from Arm, Left Updated:  02/09/19 1800     Blood Culture No growth at 2 days    POC Glucose Once [[id number]]  (Abnormal) Collected:  02/09/19 1700    Specimen:  Blood Updated:  02/09/19 1714     Glucose 192 mg/dL      Comment: : [id number] Mode Baker ID: [id number]       POC Glucose Once [[id number]]  (Abnormal) Collected:  02/09/19 1210    Specimen:  Blood Updated:  02/09/19 1221     Glucose 200 mg/dL      Comment: : [id number] Mode Baker ID: [id number]             Radiology:    Imaging Results (last 24 hours)     ** No results found for the last 24 hours. **            Assessment/Plan     Patient Active Problem List   Diagnosis Code   • Lower abdominal pain R10.30   • SIRS (systemic inflammatory response syndrome) (CMS/HCC) R65.10   • Fever, unknown origin R50.9   • Viral  gastroenteritis A08.4   • CHF (congestive heart failure) (CMS/[facility]) I50.9   • CAD (coronary artery disease) I25.10   • Diabetes mellitus (CMS/[facility]) E11.9   • Hypertension I10   • Sleep apnea G47.30   • Arthritis M19.90   • Mixed hyperlipidemia E78.2   • Shortness of breath R06.02   • Acute pancreatitis K85.90   • Chest pain, non-cardiac R07.89   • Obesity, unspecified obesity severity, unspecified obesity type E66.9   • HTN (hypertension), benign I10   • Epigastric pain R10.13   • Type 2 diabetes mellitus with hyperglycemia, with long-term current use of insulin (CMS/[facility]) E11.65, Z79.4   • Pleuritic chest pain R07.81   • Slow transit constipation K59.01   • Nausea and vomiting R11.2   • Chronic kidney disease N18.9   • Nonsmoker Z78.9   • Orthostatic hypotension I95.1   • Abdominal pain R10.9   • Constipation K59.00       As above, I reviewed the CT with radiology.  The thickening in the right colon would be an unusual spot for ischemic colitis.  On the CT there is no significant mesenteric vascular disease noted.  There is no inflammatory changes around the colon.  His white count is normal.  The colon was decompressed in the right colon, thus more likely the thickening in the right colon secondary to the decompression.    He currently is clinically improved.  Recommend we continue on antibiotic therapy.  Continue supportive care.  Clear liquid diet.      I discussed with him the options of pursuing endoscopic intervention to further evaluate and possibly determine etiology of his symptoms or at least rule out certain processes.  We discussed pursuing upper endoscopy as well as colonoscopy.  We discussed the risk of this procedure including the risk of possible perforation which can be increased after his ischemic tissue present.  The reason for the procedure is to help determine if there is ischemic tissue present as this is not clear on labs and imaging at this point without inflammatory changes elevated white  count negative abdominal exam etc.  He expressed understanding.  He agrees to proceed with the procedures.  We will plan for tomorrow.  Keep on clear liquids today.  Given 1 bottle magnesium citrate tonight and one in the morning for additional prep    The risk of the endoscopy and colonoscopy were discussed in detail.  We discussed the risk of perforation (one out of 5722-9624), bleeding (one out of 500), and the rare risks of ruptured spleen,  infection, adverse reaction to anesthesia, respiratory failure, cardiac failure including MI and adverse reaction to medications, etc.  We discussed consequences that could occur if a risk were to develop such as the need for hospitalization, blood transfusion, surgical intervention, medications, pain and disability and death.    EMR Dragon/transcription disclaimer:  Much of this encounter note is electronic transcription/translation of spoken language to printed text.  The electronic translation of spoken language may be erroneous, or at times, nonsensical words or phrases may be inadvertently transcribed.  Although I have reviewed the note for such errors, some may still exist.    Tyrell Smith MD  02/10/19  10:57 AM

## 2019-02-10 NOTE — PLAN OF CARE
Problem: Patient Care Overview  Goal: Plan of Care Review  Outcome: Ongoing (interventions implemented as appropriate)   02/10/19 0356   Coping/Psychosocial   Plan of Care Reviewed With patient   Plan of Care Review   Progress no change   OTHER   Outcome Summary Has been voiding, but has tried twice to go but cant. Will bladder scan and go from there if becomes an issue. Medicated for c/o of abdominal pain x 2 thus far this shfit. Resting easy at present time.       Problem: Fall Risk (Adult)  Goal: Absence of Fall  Outcome: Ongoing (interventions implemented as appropriate)      Problem: Nausea/Vomiting (Adult)  Goal: Symptom Relief  Outcome: Ongoing (interventions implemented as appropriate)    Goal: Adequate Hydration  Outcome: Ongoing (interventions implemented as appropriate)      Problem: Constipation (Adult)  Goal: Effective Bowel Elimination  Outcome: Ongoing (interventions implemented as appropriate)    Goal: Comfort  Outcome: Ongoing (interventions implemented as appropriate)      Problem: Skin Injury Risk (Adult)  Goal: Identify Related Risk Factors and Signs and Symptoms  Outcome: Outcome(s) achieved Date Met: 02/10/19    Goal: Skin Health and Integrity  Outcome: Ongoing (interventions implemented as appropriate)

## 2019-02-10 NOTE — PROGRESS NOTES
[facility] Medicine Services  INPATIENT PROGRESS NOTE    Length of Stay: 0  Date of Admission: 2/7/2019  Primary Care Physician: Del Shetty MD    Subjective     Chief Complaint:     Consultation for blood pressure and diabetes management    HPI     The patient's blood sugars are well-controlled on the current regimen.  Blood pressure is extremely well controlled.  Creatinine has normalized after starting IV fluids.  He has been up and walking around the hospital halls without difficulty.  He has been advanced to a clear liquid diet and is tolerating that well.    Review of Systems     All pertinent negatives and positives are as above. All other systems have been reviewed and are negative unless otherwise stated.     Objective    Temp:  [97.9 °F (36.6 °C)-98.1 °F (36.7 °C)] 98.1 °F (36.7 °C)  Heart Rate:  [65-73] 65  Resp:  [18-20] 18  BP: (119-137)/(50-64) 119/64    Lab Results (last 24 hours)     Procedure Component Value Units Date/Time    POC Glucose Once [[id number]]  (Abnormal) Collected:  02/10/19 1123    Specimen:  Blood Updated:  02/10/19 1156     Glucose 199 mg/dL      Comment: : [id number] Hard KatyMeter ID: [id number]       POC Glucose Once [[id number]]  (Abnormal) Collected:  02/10/19 0801    Specimen:  Blood Updated:  02/10/19 0820     Glucose 183 mg/dL      Comment: : [id number] Hard KatyMeter ID: [id number]       Basic Metabolic Panel [[id number]]  (Abnormal) Collected:  02/10/19 0602    Specimen:  Blood Updated:  02/10/19 0708     Glucose 148 mg/dL      BUN 14 mg/dL      Creatinine 1.37 mg/dL      Sodium 136 mmol/L      Potassium 4.1 mmol/L      Chloride 100 mmol/L      CO2 28.0 mmol/L      Calcium 8.6 mg/dL      eGFR Non African Amer 53 mL/min/1.73      BUN/Creatinine Ratio 10.2     Anion Gap 8.0 mmol/L     Narrative:       GFR Normal >60  Chronic Kidney Disease <60  Kidney Failure <15    C-reactive Protein [[id number]]  (Abnormal) Collected:   02/10/19 0602    Specimen:  Blood Updated:  02/10/19 0708     C-Reactive Protein 1.44 mg/dL     CBC & Differential [[id number]] Collected:  02/10/19 0602    Specimen:  Blood Updated:  02/10/19 0646    Narrative:       The following orders were created for panel order CBC & Differential.  Procedure                               Abnormality         Status                     ---------                               -----------         ------                     CBC Auto Differential[[id number]]        Abnormal            Final result                 Please view results for these tests on the individual orders.    CBC Auto Differential [[id number]]  (Abnormal) Collected:  02/10/19 0602    Specimen:  Blood Updated:  02/10/19 0646     WBC 7.06 10*3/mm3      RBC 3.60 10*6/mm3      Hemoglobin 10.9 g/dL      Hematocrit 32.6 %      MCV 90.6 fL      MCH 30.3 pg      MCHC 33.4 g/dL      RDW 14.3 %      RDW-SD 47.3 fl      MPV 10.6 fL      Platelets 232 10*3/mm3      Neutrophil % 61.0 %      Lymphocyte % 22.1 %      Monocyte % 9.1 %      Eosinophil % 6.7 %      Basophil % 0.7 %      Immature Grans % 0.4 %      Neutrophils, Absolute 4.31 10*3/mm3      Lymphocytes, Absolute 1.56 10*3/mm3      Monocytes, Absolute 0.64 10*3/mm3      Eosinophils, Absolute 0.47 10*3/mm3      Basophils, Absolute 0.05 10*3/mm3      Immature Grans, Absolute 0.03 10*3/mm3      nRBC 0.0 /100 WBC     POC Glucose Once [[id number]]  (Abnormal) Collected:  02/09/19 2017    Specimen:  Blood Updated:  02/09/19 2106     Glucose 213 mg/dL      Comment: : [id number] Francoise OzziekyMeter ID: [id number]       Blood Culture - Blood, Arm, Right [[id number]] Collected:  02/07/19 1800    Specimen:  Blood from Arm, Right Updated:  02/09/19 1830     Blood Culture No growth at 2 days    Blood Culture - Blood, Arm, Left [[id number]] Collected:  02/07/19 1742    Specimen:  Blood from Arm, Left Updated:  02/09/19 1800     Blood Culture No growth at 2 days    POC Glucose Once  [[id number]]  (Abnormal) Collected:  02/09/19 1700    Specimen:  Blood Updated:  02/09/19 1714     Glucose 192 mg/dL      Comment: : [id number] Mode Baker ID: [id number]             Imaging Results (last 24 hours)     ** No results found for the last 24 hours. **             Intake/Output Summary (Last 24 hours) at 2/10/2019 1628  Last data filed at 2/10/2019 0315  Gross per 24 hour   Intake 1122 ml   Output 625 ml   Net 497 ml       Physical Exam    Constitutional: He is oriented to person, place, and time. He appears well-developed and well-nourished. He is cooperative.   Morbidly obese   HENT:   Head: Normocephalic and atraumatic.   Nose: Nose normal.   Mouth/Throat: Oropharynx is clear and moist.   Eyes: Conjunctivae are normal. No scleral icterus.   Neck: Normal range of motion. Neck supple.   Cardiovascular: Normal rate and regular rhythm.   No murmur heard.  Pulmonary/Chest: Effort normal and breath sounds normal. No respiratory distress.   Abdominal: Soft. He exhibits mild distension.  No tenderness is noted with palpation.  Musculoskeletal: Normal range of motion. He exhibits no edema.   Neurological: He is alert and oriented to person, place, and time. Coordination normal.   Skin: Skin is warm and dry.   Psychiatric: He has a normal mood and affect. His behavior is normal. Judgment and thought content normal.     Results Review:  I have reviewed the labs, radiology results, and diagnostic studies since my last progress note and made treatment changes reflective of the results.   I have reviewed the current medications.    Assessment/Plan     Active Hospital Problems    Diagnosis   • Abdominal pain   • Constipation   • Orthostatic hypotension   • Obesity, unspecified obesity severity, unspecified obesity type   • HTN (hypertension), benign   • Type 2 diabetes mellitus with hyperglycemia, with long-term current use of insulin (CMS/[facility])     hold insulin the am of procedure     • Chest pain,  non-cardiac   • CAD (coronary artery disease)   • CHF (congestive heart failure) (CMS/[facility])       PLAN:  Continue sliding scale insulin  Decrease IV fluids to 50 cc/h    Payam Keyes DO   02/10/19   4:28 PM

## 2019-02-10 NOTE — PROGRESS NOTES
Asha Fowler MD Progress Note     LOS: 0 days   Patient Care Team:  Del Shetty MD as PCP - General (Family Medicine)  Juan Chua DO as PCP - Claims Attributed  Emeka Garay MD as Cardiologist (Cardiology)  Cristopher Hannah MD as Consulting Physician (Gastroenterology)  Brian Lomas MD as Consulting Physician (Nephrology)        Subjective     Had a lot of diarrhea yesterday but none today.  Still with crampy abdominal pain    Objective     Vital Signs  Temp:  [97.8 °F (36.6 °C)-98 °F (36.7 °C)] 97.9 °F (36.6 °C)  Heart Rate:  [67-78] 72  Resp:  [18-24] 18  BP: (126-152)/(50-72) 128/57    Intake & Output (last 3 days)       02/07 0701 - 02/08 0700 02/08 0701 - 02/09 0700 02/09 0701 - 02/10 0700 02/10 0701 - 02/11 0700    P.O.  960      I.V. (mL/kg)   1022 (7.1)     IV Piggyback  300 700     Total Intake(mL/kg)  1260 (8.9) 1722 (12)     Urine (mL/kg/hr)  600 (0.2) 1325 (0.4)     Stool  0 0     Total Output  600 1325     Net  +660 +397             Stool Unmeasured Occurrence  5 x 1 x           Physical Exam:     General Appearance:    Alert, cooperative, in no acute distress   Lungs:     respirations regular, even and unlabored    Heart:    Regular rhythm and normal rate, normal S1 and S2, no            murmur, no gallop, no rub   Chest Wall:    No abnormalities observed   Abdomen:      Obese mild diffuse tenderness   Extremities: No edema, + SCDs   Results Review:     I reviewed the patient's new clinical results.    Lab Results (last 72 hours)     Procedure Component Value Units Date/Time    POC Glucose Once [[id number]]  (Abnormal) Collected:  02/10/19 0801    Specimen:  Blood Updated:  02/10/19 0820     Glucose 183 mg/dL      Comment: : [id number] Hard KatyMeter ID: [id number]       Basic Metabolic Panel [[id number]]  (Abnormal) Collected:  02/10/19 0602    Specimen:  Blood Updated:  02/10/19 0708     Glucose 148 mg/dL      BUN 14 mg/dL      Creatinine 1.37 mg/dL      Sodium 136 mmol/L       Potassium 4.1 mmol/L      Chloride 100 mmol/L      CO2 28.0 mmol/L      Calcium 8.6 mg/dL      eGFR Non African Amer 53 mL/min/1.73      BUN/Creatinine Ratio 10.2     Anion Gap 8.0 mmol/L     Narrative:       GFR Normal >60  Chronic Kidney Disease <60  Kidney Failure <15    C-reactive Protein [[id number]]  (Abnormal) Collected:  02/10/19 0602    Specimen:  Blood Updated:  02/10/19 0708     C-Reactive Protein 1.44 mg/dL     CBC & Differential [[id number]] Collected:  02/10/19 0602    Specimen:  Blood Updated:  02/10/19 0646    Narrative:       The following orders were created for panel order CBC & Differential.  Procedure                               Abnormality         Status                     ---------                               -----------         ------                     CBC Auto Differential[[id number]]        Abnormal            Final result                 Please view results for these tests on the individual orders.    CBC Auto Differential [[id number]]  (Abnormal) Collected:  02/10/19 0602    Specimen:  Blood Updated:  02/10/19 0646     WBC 7.06 10*3/mm3      RBC 3.60 10*6/mm3      Hemoglobin 10.9 g/dL      Hematocrit 32.6 %      MCV 90.6 fL      MCH 30.3 pg      MCHC 33.4 g/dL      RDW 14.3 %      RDW-SD 47.3 fl      MPV 10.6 fL      Platelets 232 10*3/mm3      Neutrophil % 61.0 %      Lymphocyte % 22.1 %      Monocyte % 9.1 %      Eosinophil % 6.7 %      Basophil % 0.7 %      Immature Grans % 0.4 %      Neutrophils, Absolute 4.31 10*3/mm3      Lymphocytes, Absolute 1.56 10*3/mm3      Monocytes, Absolute 0.64 10*3/mm3      Eosinophils, Absolute 0.47 10*3/mm3      Basophils, Absolute 0.05 10*3/mm3      Immature Grans, Absolute 0.03 10*3/mm3      nRBC 0.0 /100 WBC     POC Glucose Once [[id number]]  (Abnormal) Collected:  02/09/19 2017    Specimen:  Blood Updated:  02/09/19 2106     Glucose 213 mg/dL      Comment: : [id number] Owusu VickyMeter ID: [id number]       Blood Culture - Blood, Arm, Right  [[id number]] Collected:  02/07/19 1800    Specimen:  Blood from Arm, Right Updated:  02/09/19 1830     Blood Culture No growth at 2 days    Blood Culture - Blood, Arm, Left [[id number]] Collected:  02/07/19 1742    Specimen:  Blood from Arm, Left Updated:  02/09/19 1800     Blood Culture No growth at 2 days    POC Glucose Once [[id number]]  (Abnormal) Collected:  02/09/19 1700    Specimen:  Blood Updated:  02/09/19 1714     Glucose 192 mg/dL      Comment: : [id number] Brown KatBenjamínter ID: [id number]       POC Glucose Once [[id number]]  (Abnormal) Collected:  02/09/19 1210    Specimen:  Blood Updated:  02/09/19 1221     Glucose 200 mg/dL      Comment: : [id number] Brown KatBenjamínter ID: [id number]       POC Glucose Once [[id number]]  (Abnormal) Collected:  02/09/19 0806    Specimen:  Blood Updated:  02/09/19 0836     Glucose 201 mg/dL      Comment: : [id number] Brown KatBenjamínter ID: [id number]       Comprehensive Metabolic Panel [[id number]]  (Abnormal) Collected:  02/09/19 0629    Specimen:  Blood Updated:  02/09/19 0723     Glucose 205 mg/dL      BUN 17 mg/dL      Creatinine 1.62 mg/dL      Sodium 136 mmol/L      Potassium 4.2 mmol/L      Chloride 97 mmol/L      CO2 30.0 mmol/L      Calcium 9.0 mg/dL      Total Protein 6.5 g/dL      Albumin 4.00 g/dL      ALT (SGPT) 21 U/L      AST (SGOT) 31 U/L      Alkaline Phosphatase 60 U/L      Total Bilirubin 1.1 mg/dL      eGFR Non African Amer 43 mL/min/1.73      Globulin 2.5 gm/dL      A/G Ratio 1.6 g/dL      BUN/Creatinine Ratio 10.5     Anion Gap 9.0 mmol/L     CBC (No Diff) [[id number]]  (Abnormal) Collected:  02/09/19 0630    Specimen:  Blood Updated:  02/09/19 0713     WBC 7.08 10*3/mm3      RBC 3.98 10*6/mm3      Hemoglobin 12.0 g/dL      Hematocrit 36.4 %      MCV 91.5 fL      MCH 30.2 pg      MCHC 33.0 g/dL      RDW 14.6 %      RDW-SD 49.4 fl      MPV 10.8 fL      Platelets 248 10*3/mm3     POC Glucose Once [[id number]]  (Abnormal) Collected:  02/09/19 0617     Specimen:  Blood Updated:  02/09/19 0628     Glucose 197 mg/dL      Comment: : [id number] Castleman TamaraMeter ID: [id number]       POC Glucose Once [[id number]]  (Abnormal) Collected:  02/08/19 2211    Specimen:  Blood Updated:  02/08/19 2232     Glucose 180 mg/dL      Comment: : [id number] Castleman TamaraMeter ID: [id number]       POC Glucose Once [[id number]]  (Abnormal) Collected:  02/08/19 1740    Specimen:  Blood Updated:  02/08/19 1753     Glucose 262 mg/dL      Comment: : [id number] Willam EmilyMeter ID: [id number]       Lactic Acid, Plasma [[id number]]  (Normal) Collected:  02/08/19 1329    Specimen:  Blood Updated:  02/08/19 1353     Lactate 1.3 mmol/L     POC Glucose Once [[id number]]  (Abnormal) Collected:  02/08/19 1140    Specimen:  Blood Updated:  02/08/19 1152     Glucose 246 mg/dL      Comment: : [id number] Willam EmilyMeter ID: [id number]       POC Glucose Once [[id number]]  (Abnormal) Collected:  02/08/19 0858    Specimen:  Blood Updated:  02/08/19 0910     Glucose 253 mg/dL      Comment: : [id number] Michele Lee DestinyMeter ID: [id number]       Hemoglobin A1c [[id number]] Collected:  02/08/19 0631    Specimen:  Blood Updated:  02/08/19 0908     Hemoglobin A1C 8.5 %     Narrative:       Less than 6.0           Non-Diabetic Range  6.0-7.0                 ADA Therapeutic Target  Greater than 7.0        Action Suggested    CBC & Differential [[id number]] Collected:  02/08/19 0631    Specimen:  Blood Updated:  02/08/19 0743    Narrative:       The following orders were created for panel order CBC & Differential.  Procedure                               Abnormality         Status                     ---------                               -----------         ------                     Manual Differential[[id number]]          Abnormal            Final result               CBC Auto Differential[[id number]]        Abnormal            Final result                 Please view results for  these tests on the individual orders.    CBC Auto Differential [[id number]]  (Abnormal) Collected:  02/08/19 0631    Specimen:  Blood Updated:  02/08/19 0743     WBC 8.41 10*3/mm3      RBC 4.08 10*6/mm3      Hemoglobin 12.3 g/dL      Hematocrit 36.4 %      MCV 89.2 fL      MCH 30.1 pg      MCHC 33.8 g/dL      RDW 14.5 %      RDW-SD 46.4 fl      MPV 11.2 fL      Platelets 246 10*3/mm3     Manual Differential [[id number]]  (Abnormal) Collected:  02/08/19 0631    Specimen:  Blood Updated:  02/08/19 0743     Neutrophil % 81.6 %      Lymphocyte % 12.2 %      Monocyte % 4.1 %      Eosinophil % 1.0 %      Basophil % 1.0 %      Neutrophils Absolute 6.86 10*3/mm3      Lymphocytes Absolute 1.03 10*3/mm3      Monocytes Absolute 0.34 10*3/mm3      Eosinophils Absolute 0.08 10*3/mm3      Basophils Absolute 0.08 10*3/mm3      RBC Morphology Normal     WBC Morphology Normal     Platelet Morphology Normal    Lipase [[id number]]  (Normal) Collected:  02/08/19 0631    Specimen:  Blood Updated:  02/08/19 0741     Lipase 25 U/L     Amylase [[id number]]  (Normal) Collected:  02/08/19 0631    Specimen:  Blood Updated:  02/08/19 0741     Amylase 53 U/L     Magnesium [[id number]]  (Normal) Collected:  02/08/19 0631    Specimen:  Blood Updated:  02/08/19 0741     Magnesium 2.2 mg/dL     Comprehensive Metabolic Panel [[id number]]  (Abnormal) Collected:  02/08/19 0631    Specimen:  Blood Updated:  02/08/19 0741     Glucose 240 mg/dL      BUN 16 mg/dL      Creatinine 1.41 mg/dL      Sodium 136 mmol/L      Potassium 4.2 mmol/L      Chloride 97 mmol/L      CO2 29.0 mmol/L      Calcium 8.7 mg/dL      Total Protein 6.4 g/dL      Albumin 3.90 g/dL      ALT (SGPT) 21 U/L      AST (SGOT) 29 U/L      Alkaline Phosphatase 59 U/L      Total Bilirubin 0.9 mg/dL      eGFR Non African Amer 51 mL/min/1.73      Globulin 2.5 gm/dL      A/G Ratio 1.6 g/dL      BUN/Creatinine Ratio 11.3     Anion Gap 10.0 mmol/L     Protime-INR [[id number]]  (Normal) Collected:   02/08/19 0631    Specimen:  Blood Updated:  02/08/19 0728     Protime 13.1 Seconds      INR 0.96    Troponin [[id number]]  (Normal) Collected:  02/07/19 2119    Specimen:  Blood Updated:  02/07/19 2152     Troponin I <0.012 ng/mL     Lactic Acid, Reflex [[id number]]  (Normal) Collected:  02/07/19 2119    Specimen:  Blood Updated:  02/07/19 2140     Lactate 1.9 mmol/L     Urinalysis With Culture If Indicated - Urine, Clean Catch [[id number]]  (Abnormal) Collected:  02/07/19 2120    Specimen:  Urine, Clean Catch Updated:  02/07/19 2131     Color, UA Yellow     Appearance, UA Clear     pH, UA <=5.0     Specific Gravity, UA 1.012     Glucose,  mg/dL (Trace)     Ketones, UA Negative     Bilirubin, UA Negative     Blood, UA Negative     Protein, UA Negative     Leuk Esterase, UA Negative     Nitrite, UA Negative     Urobilinogen, UA 1.0 E.U./dL    Narrative:       Urine microscopic not indicated.    Lactic Acid, Reflex Timer (This will reflex a repeat order 3-3:15 hours after ordered.) [[id number]] Collected:  02/07/19 1742    Specimen:  Blood from Arm, Left Updated:  02/07/19 2115     Extra Tube Hold for add-ons.     Comment: Auto resulted.       [place] Draw [[id number]] Collected:  02/07/19 1742    Specimen:  Blood Updated:  02/07/19 1846    Narrative:       The following orders were created for panel order [place] Draw.  Procedure                               Abnormality         Status                     ---------                               -----------         ------                     Light Blue Top[[id number]]                                   Final result               Green Top (Gel)[[id number]]                                  Final result               Lavender Top[[id number]]                                     Final result               Red Top[[id number]]                                          Final result                 Please view results for these tests on the individual orders.    Light Blue Top  [[id number]] Collected:  02/07/19 1742    Specimen:  Blood from Arm, Left Updated:  02/07/19 1846     Extra Tube hold for add-on     Comment: Auto resulted       Green Top (Gel) [[id number]] Collected:  02/07/19 1742    Specimen:  Blood from Arm, Left Updated:  02/07/19 1846     Extra Tube Hold for add-ons.     Comment: Auto resulted.       Lavender Top [[id number]] Collected:  02/07/19 1742    Specimen:  Blood from Arm, Left Updated:  02/07/19 1846     Extra Tube hold for add-on     Comment: Auto resulted       Red Top [[id number]] Collected:  02/07/19 1742    Specimen:  Blood from Arm, Left Updated:  02/07/19 1846     Extra Tube Hold for add-ons.     Comment: Auto resulted.       Troponin [[id number]]  (Normal) Collected:  02/07/19 1742    Specimen:  Blood from Arm, Left Updated:  02/07/19 1829     Troponin I <0.012 ng/mL     BNP [[id number]]  (Normal) Collected:  02/07/19 1742    Specimen:  Blood from Arm, Left Updated:  02/07/19 1829     proBNP 574.0 pg/mL     Lipase [[id number]]  (Abnormal) Collected:  02/07/19 1742    Specimen:  Blood from Arm, Left Updated:  02/07/19 1815     Lipase 16 U/L     Lactic Acid, Plasma [[id number]]  (Abnormal) Collected:  02/07/19 1742    Specimen:  Blood from Arm, Left Updated:  02/07/19 1811     Lactate 3.0 mmol/L     Comprehensive Metabolic Panel [[id number]]  (Abnormal) Collected:  02/07/19 1742    Specimen:  Blood from Arm, Left Updated:  02/07/19 1809     Glucose 204 mg/dL      BUN 13 mg/dL      Creatinine 1.40 mg/dL      Sodium 135 mmol/L      Potassium 4.0 mmol/L      Chloride 97 mmol/L      CO2 24.0 mmol/L      Calcium 9.2 mg/dL      Total Protein 7.1 g/dL      Albumin 4.50 g/dL      ALT (SGPT) 20 U/L      AST (SGOT) 34 U/L      Alkaline Phosphatase 62 U/L      Total Bilirubin 0.9 mg/dL      eGFR Non African Amer 51 mL/min/1.73      Globulin 2.6 gm/dL      A/G Ratio 1.7 g/dL      BUN/Creatinine Ratio 9.3     Anion Gap 14.0 mmol/L     CBC & Differential [[id number]] Collected:   02/07/19 1742    Specimen:  Blood Updated:  02/07/19 1754    Narrative:       The following orders were created for panel order CBC & Differential.  Procedure                               Abnormality         Status                     ---------                               -----------         ------                     CBC Auto Differential[[id number]]        Abnormal            Final result                 Please view results for these tests on the individual orders.    CBC Auto Differential [[id number]]  (Abnormal) Collected:  02/07/19 1742    Specimen:  Blood from Arm, Left Updated:  02/07/19 1754     WBC 8.59 10*3/mm3      RBC 4.33 10*6/mm3      Hemoglobin 13.1 g/dL      Hematocrit 38.7 %      MCV 89.4 fL      MCH 30.3 pg      MCHC 33.9 g/dL      RDW 14.4 %      RDW-SD 46.5 fl      MPV 10.6 fL      Platelets 260 10*3/mm3      Neutrophil % 73.7 %      Lymphocyte % 13.3 %      Monocyte % 8.3 %      Eosinophil % 3.8 %      Basophil % 0.6 %      Immature Grans % 0.3 %      Neutrophils, Absolute 6.33 10*3/mm3      Lymphocytes, Absolute 1.14 10*3/mm3      Monocytes, Absolute 0.71 10*3/mm3      Eosinophils, Absolute 0.33 10*3/mm3      Basophils, Absolute 0.05 10*3/mm3      Immature Grans, Absolute 0.03 10*3/mm3      nRBC 0.0 /100 WBC         Imaging Results (last 72 hours)     Procedure Component Value Units Date/Time    CT Abdomen Pelvis With Contrast [[id number]] Collected:  02/08/19 1621     Updated:  02/08/19 1634    Narrative:       EXAMINATION:   CT ABDOMEN PELVIS W CONTRAST-  2/8/2019 4:21 PM CST     CT ABDOMEN PELVIS W CONTRAST- 2/8/2019 2:11 PM CST     HISTORY: Abd pain, gastroenteritis or colitis suspected;  K59.00-Constipation, unspecified; E87.2-Acidosis; R93.5-Abnormal  findings on diagnostic imaging of other abdominal regions, including  retroperitoneum; R10.30-Lower abdominal pain, unspecified       COMPARISON: February 7, 2019     DOSE LENGTH PRODUCT: 1605 mGy cm. Automated exposure control was  also  utilized to decrease patient radiation dose.     TECHNIQUE: Following the oral ingestion and intravenous administration  of contrast, helical CT tomographic images of the abdomen and pelvis  were acquired. Multiplanar reformatted images were provided for review.      FINDINGS:   Focal pleural thickening is present in the left chest.     LIVER: Portal venous air was described in the liver prior exam of  February 7. This is markedly improved. No residual portal venous air is  identified.      BILIARY SYSTEM: The gallbladder is surgically absent.      PANCREAS: No focal pancreatic lesion.      SPLEEN: Unremarkable.      KIDNEYS AND ADRENALS: The adrenal glands are visualized. The renal  contours demonstrate symmetric excretion of contrast. A low-density 3 cm  lesion is present posterior cortex the right kidney this may be a cyst  this is unchanged from March 27, 2017.. The ureters are decompressed and  normal in appearance.     RETROPERITONEUM: No mass, lymphadenopathy or hemorrhage.      GI TRACT: There is thickening the wall of the ascending colon. When  compared to prior study March 27 when portal air was present this  thickened wall of the ascending and transverse colon is most consistent  with ischemic colitis..    .     OTHER: There is no mesenteric mass, lymphadenopathy or fluid collection.  Vascular calcifications present in the abdominal aorta and iliac  arteries.. The osseous structures and soft tissues demonstrate no  worrisome lesions. There is no air in the portal venous system on this  exam.      PELVIS: No mass lesion, fluid collection or significant lymphadenopathy  is seen in the pelvis. The urinary bladder is normal in appearance.       Impression:       1. Previously noted mesenteric venous air and portal venous air is  resolved. However, thickening the wall of the ascending and transverse  colons noted. This is a sequela of ischemic colitis..         This report was finalized on 02/08/2019  16:31 by Dr. Eddie Winter MD.    CT Abdomen Pelvis Without Contrast [[id number]] Collected:  02/07/19 1942     Updated:  02/07/19 1955    Narrative:       EXAMINATION: CT ABDOMEN PELVIS WO CONTRAST-      2/7/2019 7:11 PM CST     HISTORY: Fall injury. Blunt trauma. Low abdominal pain.     In order to have a CT radiation dose as low as reasonably achievable  Automated Exposure Control was utilized for adjustment of the mA and/or  KV according to patient size.     DLP in mGycm= 1797.     Noncontrast abdomen pelvis CT compared with 1/9/2019.     Normal heart size.  CABG changes with coronary artery calcification.  No acute abnormality at the lung bases.     Trace amounts of air are noted within the hepatic parenchyma in the  subdiaphragmatic region. This has the appearance of intravenous air and  a few mesenteric veins adjacent to the stomach and transverse colon also  show small amounts of air. There is no colonic wall thickening to  indicate ischemia.     No portal vein or superior mesenteric vein area seen.     There is no free fluid.  No bowel dilation.     No pelvic mass or fluid.  There is moderate fecal material throughout the colon.  No diverticulitis or colitis.     Normal and symmetric adrenal glands and kidneys.  3 cm right renal cyst noted.     Summary:  1. Small amounts of mesenteric venous air within the upper abdomen and  small amounts of venous air within the liver. (Has this patient had  recent endoscopy or colonoscopy?)  2. No fluid, mass, or signs of bowel ischemia.                                   This report was finalized on 02/07/2019 19:51 by Dr. Gomez Mcgill MD.    CT Cervical Spine Without Contrast [[id number]] Collected:  02/07/19 1941     Updated:  02/07/19 1945    Narrative:       EXAMINATION: CT CERVICAL SPINE WO CONTRAST-      2/7/2019 7:11 PM CST     HISTORY: Fall injury. Neck pain.     In order to have a CT radiation dose as low as reasonably achievable  Automated Exposure Control was  utilized for adjustment of the mA and/or  KV according to patient size.     DLP in mGycm= 253.     Axial, sagittal, and coronal noncontrast CT imaging.     Vertebral bodies and posterior elements are intact.     Reconstructed sagittal images show normal curvature.   There is no malalignment. Prevertebral soft tissues are normal.   Facet joints align normally.     Reconstructed coronal images show normal lateral mass alignment.     C6-7 discectomy with anterior plate and screw fusion.       Impression:       1. No acute bony abnormality.                                         This report was finalized on 02/07/2019 19:42 by Dr. Gomez Mcgill MD.    CT Head Without Contrast [[id number]] Collected:  02/07/19 1941     Updated:  02/07/19 1944    Narrative:       EXAMINATION: CT HEAD WO CONTRAST-      2/7/2019 7:11 PM CST     HISTORY: fall, head injury     In order to have a CT radiation dose as low as reasonably achievable  Automated Exposure Control was utilized for adjustment of the mA and/or  KV according to patient size.     DLP in mGycm= 818.     Axial, sagittal, and coronal noncontrast CT imaging of the head.     The visualized paranasal sinuses are clear.     The brain and ventricles have an age appropriate appearance.   There is no hemorrhage or mass-effect.   No acute infarction is seen.     No calvarial abnormality.       Impression:       1. No acute intracranial abnormality is seen.                                         This report was finalized on 02/07/2019 19:41 by Dr. Gomez Mcgill MD.    XR Chest 1 View [[id number]] Collected:  02/07/19 1832     Updated:  02/07/19 1835    Narrative:       EXAMINATION: XR CHEST 1 VW-     2/7/2019 6:16 PM CST     HISTORY: Short of breath.     1 view chest x-ray compared with 1/17/2018.     CABG changes.     Heart size is normal.  The mediastinum is within normal limits.      The lungs are normally expanded with no pneumonia or pneumothorax.       No congestive failure  changes.                                                                       Impression:       1. No acute disease.        This report was finalized on 02/07/2019 18:32 by Dr. Gomez Mcgill MD.              Assessment/Plan       CHF (congestive heart failure) (CMS/[facility])    CAD (coronary artery disease)    Chest pain, non-cardiac    Obesity, unspecified obesity severity, unspecified obesity type    HTN (hypertension), benign    Type 2 diabetes mellitus with hyperglycemia, with long-term current use of insulin (CMS/[facility])    Orthostatic hypotension    Abdominal pain    Constipation      Will advance to full liquids.      Asha Fowler MD  02/10/19  9:33 AM

## 2019-02-10 NOTE — PROGRESS NOTES
[facility]  Inpatient Progress Note    Chief Complaint   Patient presents with   • Abdominal Pain       Subjective   Reason for Follow Up: Abdominal pain    Interval History: He notes abdominal cramping today.  He had cramps yesterday.  He states the pain is not severe.  He did have some liquid stools yesterday but none today.  He denies any nausea or vomiting.  There is been no fever chills or sweats.     LOS: 0      Current Facility-Administered Medications:   •  dextrose (D50W) 25 g/ 50mL Intravenous Solution 25 g, 25 g, Intravenous, Q15 Min PRN, Raquel Duncan APRN  •  dextrose (GLUTOSE) oral gel 15 g, 15 g, Oral, Q15 Min PRN, Raquel Duncan APRN  •  DULoxetine (CYMBALTA) DR capsule 60 mg, 60 mg, Oral, Daily, Payam Keyes DO, 60 mg at 02/10/19 0806  •  enoxaparin (LOVENOX) syringe 30 mg, 30 mg, Subcutaneous, Q12H, Ken Perry MD, 30 mg at 02/10/19 0804  •  glucagon (human recombinant) (GLUCAGEN DIAGNOSTIC) injection 1 mg, 1 mg, Subcutaneous, PRN, Raquel Duncan APRN  •  insulin lispro (humaLOG) injection 0-24 Units, 0-24 Units, Subcutaneous, 4x Daily With Meals & Nightly, Raquel Duncan APRN, 4 Units at 02/10/19 0804  •  isosorbide mononitrate (IMDUR) 24 hr tablet 30 mg, 30 mg, Oral, Daily, Raquel Duncan APRN, 30 mg at 02/10/19 0806  •  meropenem (MERREM) 1 g/100 mL 0.9% NS VTB (mbp), 1 g, Intravenous, Q8H, Julia Adrian MD, Last Rate: 0 mL/hr at 02/10/19 0315, 1 g at 02/10/19 1007  •  midodrine (PROAMATINE) tablet 10 mg, 10 mg, Oral, TID With Meals, Raquel Duncan APRN, 10 mg at 02/10/19 0805  •  Morphine sulfate (PF) injection 4 mg, 4 mg, Intravenous, Q2H PRN, Ken Perry MD, 4 mg at 02/10/19 0938  •  ondansetron (ZOFRAN) 8 mg/50 mL NS IVPB, 8 mg, Intravenous, Q6H PRN, Ken Perry MD, 8 mg at 02/09/19 1746  •  ondansetron ODT (ZOFRAN-ODT) disintegrating tablet 8 mg, 8 mg, Oral, Q6H PRN, Ken Perry MD, 8  mg at 02/09/19 1240  •  pantoprazole (PROTONIX) EC tablet 40 mg, 40 mg, Oral, Daily, Raquel Duncan APRSHERRILL, 40 mg at 02/10/19 0804  •  Pharmacy Consult - Pharmacy to dose, , Does not apply, Continuous PRN, Julia Adrian MD  •  polyethylene glycol (MIRALAX) powder 17 g, 17 g, Oral, Daily, Justyna Barry, APRSHERRILL, 17 g at 02/10/19 0805  •  rosuvastatin (CRESTOR) tablet 40 mg, 40 mg, Oral, Daily, Raquel Duncan APRN, 40 mg at 02/10/19 0806  •  sodium chloride 0.9 % flush 1-10 mL, 1-10 mL, Intravenous, PRN, Ken Perry MD, 10 mL at 02/09/19 0102  •  sodium chloride 0.9 % flush 10 mL, 10 mL, Intravenous, PRN, Ken Perry MD, 10 mL at 02/09/19 0622  •  sodium chloride 0.9 % flush 3 mL, 3 mL, Intravenous, Q12H, Ken ePrry MD, 3 mL at 02/10/19 0807  •  sodium chloride 0.9 % infusion, 100 mL/hr, Intravenous, Continuous, Payam Keyes DO, Last Rate: 100 mL/hr at 02/10/19 0910, 100 mL/hr at 02/10/19 0910    Review of Systems   Constitutional: Negative for chills and fever.   Cardiovascular: Negative for chest pain and palpitations.   Gastrointestinal: Negative for abdominal distention, blood in stool, nausea and vomiting.       Objective     Vital Signs  Temp:  [97.8 °F (36.6 °C)-98 °F (36.7 °C)] 97.9 °F (36.6 °C)  Heart Rate:  [67-78] 72  Resp:  [18-24] 18  BP: (126-152)/(50-72) 128/57    Intake/Output Summary (Last 24 hours) at 2/10/2019 1057  Last data filed at 2/10/2019 0315  Gross per 24 hour   Intake 1622 ml   Output 625 ml   Net 997 ml     No intake/output data recorded.         Physical Exam   Constitutional: He appears well-developed. No distress.   Pulmonary/Chest: Effort normal and breath sounds normal.   Abdominal: Soft. Bowel sounds are normal. He exhibits no distension. There is no tenderness. There is no rebound and no guarding.   Obese   Neurological: He is alert.   Psychiatric: He has a normal mood and affect. His behavior is normal.           Results Review:     I  reviewed the patient's new clinical results.  Discussed with Dr. Hart.  We looked at the CT scan of the abdomen done yesterday.  There is thickening of the colon in the transverse and ascending area but there are no inflammatory changes.  This portion of the colon is decompressed.  He noted that the SMA and ABIGAIL appeared disease-free.  There is a small calcification in the celiac artery but no noted stenosis.    Lab Results (last 24 hours)     Procedure Component Value Units Date/Time    POC Glucose Once [[id number]]  (Abnormal) Collected:  02/10/19 0801    Specimen:  Blood Updated:  02/10/19 0820     Glucose 183 mg/dL      Comment: : [id number] Hard KatyMeter ID: [id number]       Basic Metabolic Panel [[id number]]  (Abnormal) Collected:  02/10/19 0602    Specimen:  Blood Updated:  02/10/19 0708     Glucose 148 mg/dL      BUN 14 mg/dL      Creatinine 1.37 mg/dL      Sodium 136 mmol/L      Potassium 4.1 mmol/L      Chloride 100 mmol/L      CO2 28.0 mmol/L      Calcium 8.6 mg/dL      eGFR Non African Amer 53 mL/min/1.73      BUN/Creatinine Ratio 10.2     Anion Gap 8.0 mmol/L     Narrative:       GFR Normal >60  Chronic Kidney Disease <60  Kidney Failure <15    C-reactive Protein [[id number]]  (Abnormal) Collected:  02/10/19 0602    Specimen:  Blood Updated:  02/10/19 0708     C-Reactive Protein 1.44 mg/dL     CBC & Differential [[id number]] Collected:  02/10/19 0602    Specimen:  Blood Updated:  02/10/19 0646    Narrative:       The following orders were created for panel order CBC & Differential.  Procedure                               Abnormality         Status                     ---------                               -----------         ------                     CBC Auto Differential[[id number]]        Abnormal            Final result                 Please view results for these tests on the individual orders.    CBC Auto Differential [[id number]]  (Abnormal) Collected:  02/10/19 0602    Specimen:  Blood  Updated:  02/10/19 0646     WBC 7.06 10*3/mm3      RBC 3.60 10*6/mm3      Hemoglobin 10.9 g/dL      Hematocrit 32.6 %      MCV 90.6 fL      MCH 30.3 pg      MCHC 33.4 g/dL      RDW 14.3 %      RDW-SD 47.3 fl      MPV 10.6 fL      Platelets 232 10*3/mm3      Neutrophil % 61.0 %      Lymphocyte % 22.1 %      Monocyte % 9.1 %      Eosinophil % 6.7 %      Basophil % 0.7 %      Immature Grans % 0.4 %      Neutrophils, Absolute 4.31 10*3/mm3      Lymphocytes, Absolute 1.56 10*3/mm3      Monocytes, Absolute 0.64 10*3/mm3      Eosinophils, Absolute 0.47 10*3/mm3      Basophils, Absolute 0.05 10*3/mm3      Immature Grans, Absolute 0.03 10*3/mm3      nRBC 0.0 /100 WBC     POC Glucose Once [[id number]]  (Abnormal) Collected:  02/09/19 2017    Specimen:  Blood Updated:  02/09/19 2106     Glucose 213 mg/dL      Comment: : [id number] Francoise Mcmanus ID: [id number]       Blood Culture - Blood, Arm, Right [[id number]] Collected:  02/07/19 1800    Specimen:  Blood from Arm, Right Updated:  02/09/19 1830     Blood Culture No growth at 2 days    Blood Culture - Blood, Arm, Left [[id number]] Collected:  02/07/19 1742    Specimen:  Blood from Arm, Left Updated:  02/09/19 1800     Blood Culture No growth at 2 days    POC Glucose Once [[id number]]  (Abnormal) Collected:  02/09/19 1700    Specimen:  Blood Updated:  02/09/19 1714     Glucose 192 mg/dL      Comment: : [id number] Mode Baker ID: [id number]       POC Glucose Once [[id number]]  (Abnormal) Collected:  02/09/19 1210    Specimen:  Blood Updated:  02/09/19 1221     Glucose 200 mg/dL      Comment: : [id number] Mode Baker ID: [id number]             Radiology:    Imaging Results (last 24 hours)     ** No results found for the last 24 hours. **            Assessment/Plan     Patient Active Problem List   Diagnosis Code   • Lower abdominal pain R10.30   • SIRS (systemic inflammatory response syndrome) (CMS/HCC) R65.10   • Fever, unknown origin R50.9   • Viral  gastroenteritis A08.4   • CHF (congestive heart failure) (CMS/[facility]) I50.9   • CAD (coronary artery disease) I25.10   • Diabetes mellitus (CMS/[facility]) E11.9   • Hypertension I10   • Sleep apnea G47.30   • Arthritis M19.90   • Mixed hyperlipidemia E78.2   • Shortness of breath R06.02   • Acute pancreatitis K85.90   • Chest pain, non-cardiac R07.89   • Obesity, unspecified obesity severity, unspecified obesity type E66.9   • HTN (hypertension), benign I10   • Epigastric pain R10.13   • Type 2 diabetes mellitus with hyperglycemia, with long-term current use of insulin (CMS/[facility]) E11.65, Z79.4   • Pleuritic chest pain R07.81   • Slow transit constipation K59.01   • Nausea and vomiting R11.2   • Chronic kidney disease N18.9   • Nonsmoker Z78.9   • Orthostatic hypotension I95.1   • Abdominal pain R10.9   • Constipation K59.00       As above, I reviewed the CT with radiology.  The thickening in the right colon would be an unusual spot for ischemic colitis.  On the CT there is no significant mesenteric vascular disease noted.  There is no inflammatory changes around the colon.  His white count is normal.  The colon was decompressed in the right colon, thus more likely the thickening in the right colon secondary to the decompression.    He currently is clinically improved.  Recommend we continue on antibiotic therapy.  Continue supportive care.  Clear liquid diet.      I discussed with him the options of pursuing endoscopic intervention to further evaluate and possibly determine etiology of his symptoms or at least rule out certain processes.  We discussed pursuing upper endoscopy as well as colonoscopy.  We discussed the risk of this procedure including the risk of possible perforation which can be increased after his ischemic tissue present.  The reason for the procedure is to help determine if there is ischemic tissue present as this is not clear on labs and imaging at this point without inflammatory changes elevated white  count negative abdominal exam etc.  He expressed understanding.  He agrees to proceed with the procedures.  We will plan for tomorrow.  Keep on clear liquids today.  Given 1 bottle magnesium citrate tonight and one in the morning for additional prep    The risk of the endoscopy and colonoscopy were discussed in detail.  We discussed the risk of perforation (one out of 6961-9282), bleeding (one out of 500), and the rare risks of ruptured spleen,  infection, adverse reaction to anesthesia, respiratory failure, cardiac failure including MI and adverse reaction to medications, etc.  We discussed consequences that could occur if a risk were to develop such as the need for hospitalization, blood transfusion, surgical intervention, medications, pain and disability and death.    EMR Dragon/transcription disclaimer:  Much of this encounter note is electronic transcription/translation of spoken language to printed text.  The electronic translation of spoken language may be erroneous, or at times, nonsensical words or phrases may be inadvertently transcribed.  Although I have reviewed the note for such errors, some may still exist.    Tyrell Smith MD  02/10/19  10:57 AM

## 2019-02-11 ENCOUNTER — ANESTHESIA EVENT (OUTPATIENT)
Dept: GASTROENTEROLOGY | Facility: HOSPITAL | Age: 64
End: 2019-02-11

## 2019-02-11 ENCOUNTER — ANESTHESIA (OUTPATIENT)
Dept: GASTROENTEROLOGY | Facility: HOSPITAL | Age: 64
End: 2019-02-11

## 2019-02-11 PROBLEM — I50.32 CHRONIC DIASTOLIC CONGESTIVE HEART FAILURE: Chronic | Status: ACTIVE | Noted: 2017-02-06

## 2019-02-11 PROBLEM — N18.30 CHRONIC KIDNEY DISEASE, STAGE III (MODERATE): Status: ACTIVE | Noted: 2019-01-16

## 2019-02-11 PROBLEM — K55.9 ISCHEMIC COLITIS: Status: ACTIVE | Noted: 2019-02-11

## 2019-02-11 LAB
ADV 40+41 DNA STL QL NAA+NON-PROBE: NOT DETECTED
ASTRO TYP 1-8 RNA STL QL NAA+NON-PROBE: NOT DETECTED
C CAYETANENSIS DNA STL QL NAA+NON-PROBE: NOT DETECTED
C DIFF TOX GENS STL QL NAA+PROBE: NOT DETECTED
CAMPY SP DNA.DIARRHEA STL QL NAA+PROBE: NOT DETECTED
CRYPTOSP STL CULT: NOT DETECTED
E COLI DNA SPEC QL NAA+PROBE: NOT DETECTED
E HISTOLYT AG STL-ACNC: NOT DETECTED
EAEC PAA PLAS AGGR+AATA ST NAA+NON-PRB: NOT DETECTED
EC STX1 + STX2 GENES STL NAA+PROBE: NOT DETECTED
EPEC EAE GENE STL QL NAA+NON-PROBE: NOT DETECTED
ETEC LTA+ST1A+ST1B TOX ST NAA+NON-PROBE: NOT DETECTED
G LAMBLIA DNA SPEC QL NAA+PROBE: NOT DETECTED
GLUCOSE BLDC GLUCOMTR-MCNC: 150 MG/DL (ref 70–130)
GLUCOSE BLDC GLUCOMTR-MCNC: 211 MG/DL (ref 70–130)
GLUCOSE BLDC GLUCOMTR-MCNC: 244 MG/DL (ref 70–130)
NOROVIRUS GI+II RNA STL QL NAA+NON-PROBE: NOT DETECTED
P SHIGELLOIDES DNA STL QL NAA+NON-PROBE: NOT DETECTED
RV RNA STL NAA+PROBE: NOT DETECTED
SALMONELLA DNA SPEC QL NAA+PROBE: NOT DETECTED
SAPO I+II+IV+V RNA STL QL NAA+NON-PROBE: NOT DETECTED
SHIGELLA SP+EIEC IPAH STL QL NAA+PROBE: NOT DETECTED
V CHOLERAE DNA SPEC QL NAA+PROBE: NOT DETECTED
VIBRIO DNA SPEC NAA+PROBE: NOT DETECTED
YERSINIA STL CULT: NOT DETECTED

## 2019-02-11 PROCEDURE — 88305 TISSUE EXAM BY PATHOLOGIST: CPT | Performed by: INTERNAL MEDICINE

## 2019-02-11 PROCEDURE — G0378 HOSPITAL OBSERVATION PER HR: HCPCS

## 2019-02-11 PROCEDURE — 43239 EGD BIOPSY SINGLE/MULTIPLE: CPT | Performed by: INTERNAL MEDICINE

## 2019-02-11 PROCEDURE — 87507 IADNA-DNA/RNA PROBE TQ 12-25: CPT | Performed by: INTERNAL MEDICINE

## 2019-02-11 PROCEDURE — 94799 UNLISTED PULMONARY SVC/PX: CPT

## 2019-02-11 PROCEDURE — 25010000002 PROPOFOL 10 MG/ML EMULSION: Performed by: NURSE ANESTHETIST, CERTIFIED REGISTERED

## 2019-02-11 PROCEDURE — 25010000002 MEROPENEM PER 100 MG: Performed by: INTERNAL MEDICINE

## 2019-02-11 PROCEDURE — 45380 COLONOSCOPY AND BIOPSY: CPT | Performed by: INTERNAL MEDICINE

## 2019-02-11 PROCEDURE — 63710000001 INSULIN LISPRO (HUMAN) PER 5 UNITS: Performed by: NURSE PRACTITIONER

## 2019-02-11 PROCEDURE — 96366 THER/PROPH/DIAG IV INF ADDON: CPT

## 2019-02-11 PROCEDURE — 82962 GLUCOSE BLOOD TEST: CPT

## 2019-02-11 PROCEDURE — 94760 N-INVAS EAR/PLS OXIMETRY 1: CPT

## 2019-02-11 PROCEDURE — 87081 CULTURE SCREEN ONLY: CPT | Performed by: INTERNAL MEDICINE

## 2019-02-11 PROCEDURE — 25010000002 ENOXAPARIN PER 10 MG: Performed by: SPECIALIST

## 2019-02-11 PROCEDURE — 25010000002 MORPHINE PER 10 MG: Performed by: SPECIALIST

## 2019-02-11 RX ORDER — MAGNESIUM CARB/ALUMINUM HYDROX 105-160MG
296 TABLET,CHEWABLE ORAL ONCE
Status: COMPLETED | OUTPATIENT
Start: 2019-02-11 | End: 2019-02-11

## 2019-02-11 RX ORDER — SODIUM CHLORIDE 0.9 % (FLUSH) 0.9 %
3 SYRINGE (ML) INJECTION AS NEEDED
Status: DISCONTINUED | OUTPATIENT
Start: 2019-02-11 | End: 2019-02-11 | Stop reason: HOSPADM

## 2019-02-11 RX ORDER — SODIUM CHLORIDE 9 MG/ML
500 INJECTION, SOLUTION INTRAVENOUS CONTINUOUS PRN
Status: DISCONTINUED | OUTPATIENT
Start: 2019-02-11 | End: 2019-02-13 | Stop reason: HOSPADM

## 2019-02-11 RX ORDER — LIDOCAINE HYDROCHLORIDE 20 MG/ML
INJECTION, SOLUTION INFILTRATION; PERINEURAL AS NEEDED
Status: DISCONTINUED | OUTPATIENT
Start: 2019-02-11 | End: 2019-02-11 | Stop reason: SURG

## 2019-02-11 RX ORDER — PROPOFOL 10 MG/ML
VIAL (ML) INTRAVENOUS AS NEEDED
Status: DISCONTINUED | OUTPATIENT
Start: 2019-02-11 | End: 2019-02-11 | Stop reason: SURG

## 2019-02-11 RX ADMIN — MEROPENEM 1 G: 1 INJECTION, POWDER, FOR SOLUTION INTRAVENOUS at 08:21

## 2019-02-11 RX ADMIN — MEROPENEM 1 G: 1 INJECTION, POWDER, FOR SOLUTION INTRAVENOUS at 00:01

## 2019-02-11 RX ADMIN — MIDODRINE HYDROCHLORIDE 10 MG: 10 TABLET ORAL at 18:36

## 2019-02-11 RX ADMIN — MORPHINE SULFATE 4 MG: 4 INJECTION, SOLUTION INTRAMUSCULAR; INTRAVENOUS at 20:24

## 2019-02-11 RX ADMIN — PROPOFOL 100 MG: 10 INJECTION, EMULSION INTRAVENOUS at 12:47

## 2019-02-11 RX ADMIN — Medication 296 ML: at 05:35

## 2019-02-11 RX ADMIN — PROPOFOL 150 MG: 10 INJECTION, EMULSION INTRAVENOUS at 12:37

## 2019-02-11 RX ADMIN — ENOXAPARIN SODIUM 30 MG: 30 INJECTION SUBCUTANEOUS at 20:24

## 2019-02-11 RX ADMIN — INSULIN LISPRO 8 UNITS: 100 INJECTION, SOLUTION INTRAVENOUS; SUBCUTANEOUS at 20:24

## 2019-02-11 RX ADMIN — PANTOPRAZOLE SODIUM 40 MG: 40 TABLET, DELAYED RELEASE ORAL at 08:21

## 2019-02-11 RX ADMIN — MIDODRINE HYDROCHLORIDE 10 MG: 10 TABLET ORAL at 08:21

## 2019-02-11 RX ADMIN — ROSUVASTATIN CALCIUM 40 MG: 20 TABLET, FILM COATED ORAL at 08:22

## 2019-02-11 RX ADMIN — Medication 296 ML: at 00:00

## 2019-02-11 RX ADMIN — INSULIN LISPRO 8 UNITS: 100 INJECTION, SOLUTION INTRAVENOUS; SUBCUTANEOUS at 18:36

## 2019-02-11 RX ADMIN — PROPOFOL 100 MG: 10 INJECTION, EMULSION INTRAVENOUS at 12:31

## 2019-02-11 RX ADMIN — MEROPENEM 1 G: 1 INJECTION, POWDER, FOR SOLUTION INTRAVENOUS at 18:36

## 2019-02-11 RX ADMIN — LIDOCAINE HYDROCHLORIDE 100 MG: 20 INJECTION, SOLUTION INFILTRATION; PERINEURAL at 12:27

## 2019-02-11 RX ADMIN — PROPOFOL 150 MG: 10 INJECTION, EMULSION INTRAVENOUS at 12:27

## 2019-02-11 RX ADMIN — ISOSORBIDE MONONITRATE 30 MG: 30 TABLET, EXTENDED RELEASE ORAL at 08:21

## 2019-02-11 NOTE — PROGRESS NOTES
LOS: 0 days   Patient Care Team:  Del Shetty MD as PCP - General (Family Medicine)  Juan Chua DO as PCP - Claims Attributed  Emeka Garay MD as Cardiologist (Cardiology)  Cristopher Hannah MD as Consulting Physician (Gastroenterology)  Brian Lomas MD as Consulting Physician (Nephrology)    Chief Complaint: Hospital day #4 for central abdominal pain  Subjective     Subjective     Hospital day #4 for central abdominal pain he is coming back from his endoscopy he is sleepy no voiced problems.  His upper endoscopy showed no problems.  His ileum was normal he had patchy mild inflammation in the transverse colon and the ascending colon and cecum secondary to colitis did not have the typical appearance of ischemic colitis a biopsy was completed.  The upper endoscopy was normal.  Objective      Objective    Vital Signs  Temp:  [97.6 °F (36.4 °C)-98.4 °F (36.9 °C)] 97.9 °F (36.6 °C)  Heart Rate:  [63-81] 70  Resp:  [14-20] 16  BP: (120-153)/(43-76) 137/43    Intake & Output (last 3 days)       02/08 0701 - 02/09 0700 02/09 0701 - 02/10 0700 02/10 0701 - 02/11 0700 02/11 0701 - 02/12 0700    P.O. 960       I.V. (mL/kg)  1022 (7.1)  300 (2.1)    IV Piggyback 300 700 100 100    Total Intake(mL/kg) 1260 (8.9) 1722 (12) 100 (0.7) 400 (2.8)    Urine (mL/kg/hr) 600 (0.2) 1325 (0.4) 1775 (0.5)     Stool 0 0 0     Total Output 600 1325 1775     Net +660 +397 -1675 +400            Urine Unmeasured Occurrence    1 x    Stool Unmeasured Occurrence 5 x 1 x 4 x 1 x          Physical Exam:     General Appearance:    Alert, cooperative, in no acute distress   Lungs:     Clear to auscultation,respirations regular, even and                  unlabored    Heart:    Regular rhythm and normal rate, normal S1 and S2, no            murmur, no gallop, no rub, no click   Chest Wall:    No abnormalities observed   Abdomen:    Obese abdomen, occasional bowel sounds, minimally tender, white count is noted down to 7,  hematocrit 33.  Electrolytes within normal limits.  Creatinine 1.3.  Good urine output multiple stools yesterday approximately 11 over the past 3 days.        Results Review:     I reviewed the patient's new clinical results.  I reviewed the patient's new imaging results and agree with the interpretation.    Results from last 7 days   Lab Units 02/10/19  0602 02/09/19  0630 02/08/19  0631   WBC 10*3/mm3 7.06 7.08 8.41   HEMOGLOBIN g/dL 10.9* 12.0* 12.3*   HEMATOCRIT % 32.6* 36.4* 36.4*   PLATELETS 10*3/mm3 232 248 246        Results from last 7 days   Lab Units 02/10/19  0602 02/09/19  0629 02/08/19  0631 02/07/19  1742   SODIUM mmol/L 136 136 136 135   POTASSIUM mmol/L 4.1 4.2 4.2 4.0   CHLORIDE mmol/L 100 97* 97* 97*   CO2 mmol/L 28.0 30.0 29.0 24.0   BUN mg/dL 14 17 16 13   CREATININE mg/dL 1.37 1.62* 1.41* 1.40   CALCIUM mg/dL 8.6 9.0 8.7 9.2   BILIRUBIN mg/dL  --  1.1* 0.9 0.9   ALK PHOS U/L  --  60 59 62   ALT (SGPT) U/L  --  21 21 20   AST (SGOT) U/L  --  31 29 34   GLUCOSE mg/dL 148* 205* 240* 204*       Assessment/Plan     Assessment/Plan       Lower abdominal pain    Chronic diastolic congestive heart failure (CMS/HCC)    CAD (coronary artery disease)    Obesity, unspecified obesity severity, unspecified obesity type    HTN (hypertension), benign    Type 2 diabetes mellitus with hyperglycemia, with long-term current use of insulin (CMS/[facility])    Chronic kidney disease, stage III (moderate) (CMS/HCC)    Orthostatic hypotension    Abdominal pain    Constipation    Ischemic colitis (CMS/HCC)      Patient with unremarkable upper endoscopy colonoscopy shows some minimal infectious colitis there is no evidence of ischemic colitis the thickening noted on CT scan was possible due to the bowel being decompressed?  But no sign of any significant ischemic colitis noted.  I still have no idea why he had such a problem with portal venous gas no obvious source for this to this point.  Continue his course of treatment  appreciate Dr. Luis Marshall and Dr. Adame's assistance.    Increase to regular diet and potentially home on Wednesday after another day and a half of Merrem.  It seems like that is the treatment that he responded to besides expulsion of his constipation    Ken Perry MD  02/11/19  4:49 PM      Time: time spent with patient 15 minutes     EMR Dragon/Transcription disclaimer: Much of this encounter note is an electronic transcription/translation of spoken language to printed text. The electronic translation of spoken language may permit erroneous, or at times, nonsensical words or phrases to be inadvertently transcribed; although I have reviewed the note for such errors, some may still exist.

## 2019-02-11 NOTE — ANESTHESIA POSTPROCEDURE EVALUATION
Patient: Erick Luong    Procedure Summary     Date:  02/11/19 Room / Location:  [facility] ENDOSCOPY 4 / [facility]    Anesthesia Start:  1225 Anesthesia Stop:  1256    Procedures:       ESOPHAGOGASTRODUODENOSCOPY WITH ANESTHESIA (N/A )      COLONOSCOPY WITH ANESTHESIA (N/A ) Diagnosis:       Lower abdominal pain      (Lower abdominal pain [R10.30])    Surgeon:  Tyrell Smith MD Provider:  Percy Waller CRNA    Anesthesia Type:  MAC ASA Status:  3          Anesthesia Type: MAC  Last vitals  BP   131/61 (02/11/19 1310)   Temp   98.1 °F (36.7 °C) (02/11/19 1129)   Pulse   64 (02/11/19 1310)   Resp   15 (02/11/19 1310)     SpO2   96 % (02/11/19 1310)     Post Anesthesia Care and Evaluation    Patient location during evaluation: PHASE II  Level of consciousness: awake and alert  Pain management: adequate  Airway patency: patent  Anesthetic complications: No anesthetic complications    Cardiovascular status: acceptable  Respiratory status: acceptable  Hydration status: acceptable

## 2019-02-11 NOTE — PLAN OF CARE
Problem: Patient Care Overview  Goal: Plan of Care Review  Outcome: Ongoing (interventions implemented as appropriate)   02/11/19 8386   Coping/Psychosocial   Plan of Care Reviewed With patient   Plan of Care Review   Progress no change   OTHER   Outcome Summary Medicated for abdominal pain as needed. Up to [facility], BM's are still not clear he has had only 3 BM's since starting Mag citrate yesterday. Voiding without a problem. NPO for colonoscopy and Endo today.        Problem: Fall Risk (Adult)  Goal: Absence of Fall  Outcome: Ongoing (interventions implemented as appropriate)      Problem: Nausea/Vomiting (Adult)  Goal: Symptom Relief  Outcome: Ongoing (interventions implemented as appropriate)    Goal: Adequate Hydration  Outcome: Ongoing (interventions implemented as appropriate)      Problem: Constipation (Adult)  Goal: Effective Bowel Elimination  Outcome: Ongoing (interventions implemented as appropriate)    Goal: Comfort  Outcome: Ongoing (interventions implemented as appropriate)      Problem: Skin Injury Risk (Adult)  Goal: Skin Health and Integrity  Outcome: Ongoing (interventions implemented as appropriate)

## 2019-02-11 NOTE — PROGRESS NOTES
[facility] Medicine Services  INPATIENT PROGRESS NOTE    Patient Name: Erick Luong  Date of Admission: 2/7/2019  Today's Date: 02/11/19  Length of Stay: 0  Primary Care Physician: Del Shetty MD    Subjective   Chief Complaint: abdominal pain  HPI   He prepped with 2 bottoms of magnesium citrate last evening.  He had no trouble with this.  He does not complain of any abdominal pain at present.  He did try to take some clear liquids over the weekend, but vomited with this.  He states that he has not had anything to eat in 4 days.    He is for colonoscopy with Dr. Smith this morning.    Review of Systems   All pertinent negatives and positives are as above. All other systems have been reviewed and are negative unless otherwise stated.     Objective    Temp:  [97.6 °F (36.4 °C)-98.4 °F (36.9 °C)] 98.1 °F (36.7 °C)  Heart Rate:  [65-81] 69  Resp:  [18-20] 18  BP: (119-153)/(48-64) 135/48  Physical Exam   Constitutional: He is oriented to person, place, and time. He appears well-developed and well-nourished.   Up in bed.  No distress.  Nontoxic.  His wife, brother, and sister are present with him.  Discussed with his nurse, Farhana.   HENT:   Head: Normocephalic and atraumatic.   Eyes: Conjunctivae and EOM are normal. Pupils are equal, round, and reactive to light.   Neck: Neck supple. No JVD present.   Cardiovascular: Normal rate, regular rhythm, normal heart sounds and intact distal pulses. Exam reveals no gallop and no friction rub.   No murmur heard.  Pulmonary/Chest: Effort normal and breath sounds normal. No respiratory distress. He has no wheezes. He has no rales. He exhibits no tenderness.   Abdominal: Soft. Bowel sounds are normal. He exhibits no distension. There is no tenderness. There is no rebound and no guarding.   His abdominal exam is benign at this point.   Musculoskeletal: Normal range of motion. He exhibits edema (trace). He exhibits no tenderness or  deformity.   Neurological: He is alert and oriented to person, place, and time. He displays normal reflexes. No cranial nerve deficit. He exhibits normal muscle tone.   Skin: Skin is warm and dry. No rash noted.   Psychiatric: He has a normal mood and affect. His behavior is normal. Judgment and thought content normal.     Results Review:  I have reviewed the labs, radiology results, and diagnostic studies.    Laboratory Data:   Results from last 7 days   Lab Units 02/10/19  0602 02/09/19  0630 02/08/19  0631   WBC 10*3/mm3 7.06 7.08 8.41   HEMOGLOBIN g/dL 10.9* 12.0* 12.3*   HEMATOCRIT % 32.6* 36.4* 36.4*   PLATELETS 10*3/mm3 232 248 246     Results from last 7 days   Lab Units 02/10/19  0602 02/09/19  0629 02/08/19  0631 02/07/19  1742   SODIUM mmol/L 136 136 136 135   POTASSIUM mmol/L 4.1 4.2 4.2 4.0   CHLORIDE mmol/L 100 97* 97* 97*   CO2 mmol/L 28.0 30.0 29.0 24.0   BUN mg/dL 14 17 16 13   CREATININE mg/dL 1.37 1.62* 1.41* 1.40   CALCIUM mg/dL 8.6 9.0 8.7 9.2   BILIRUBIN mg/dL  --  1.1* 0.9 0.9   ALK PHOS U/L  --  60 59 62   ALT (SGPT) U/L  --  21 21 20   AST (SGOT) U/L  --  31 29 34   GLUCOSE mg/dL 148* 205* 240* 204*     Culture Data:   Blood Culture   Date Value Ref Range Status   02/07/2019 No growth at 3 days  Preliminary   02/07/2019 No growth at 3 days  Preliminary     I have reviewed the patient's current medications.     Assessment/Plan     Active Hospital Problems    Diagnosis   • **Lower abdominal pain   • Ischemic colitis (CMS/HCC)   • Abdominal pain   • Constipation   • Orthostatic hypotension   • Chronic kidney disease, stage III (moderate) (CMS/HCC)   • Obesity, unspecified obesity severity, unspecified obesity type   • HTN (hypertension), benign   • Type 2 diabetes mellitus with hyperglycemia, with long-term current use of insulin (CMS/[facility])     hold insulin the am of procedure     • CAD (coronary artery disease)   • Chronic diastolic congestive heart failure (CMS/[facility])     Plan:   The patient is  admitted to Dr. Perry's service.  Internal medicine, gastroenterology, and infectious disease are also following.    He presented on 2/7 with complaints of lower abdominal pain.  He has problems with chronic constipation.  CT scan noted gas in the portal system of unknown etiology.  There was concern of possible translocation of bacteria.  He was started on broad-spectrum antibiotics.  Blood cultures show no growth to date.  He is for a colonoscopy with Dr. Smith today.    It is quite possible that the patient suffered an episode of ischemic colitis with dropping of his blood pressure last week.  This could have caused the phenomenon seen on CT.    We are primarily following for diabetes and essential hypertension/orthostatic hypotension.  Dr. Lomas recently started him on midodrine.  His blood pressure has been largely acceptable.  His blood sugars have also been largely acceptable with some improvement over the last 24 hours.    He remains nothing by mouth at this point in time.  We will continue to follow so we can resume more of his medications as appropriate.    Discharge Planning: Per general surgery.     Timbo Adame DO   02/11/19   11:39 AM

## 2019-02-11 NOTE — ANESTHESIA PREPROCEDURE EVALUATION
Anesthesia Evaluation     Patient summary reviewed and Nursing notes reviewed   NPO Solid Status: > 8 hours  NPO Liquid Status: > 8 hours           Airway   Mallampati: III  TM distance: <3 FB  Neck ROM: full  possible difficult intubation  Dental - normal exam     Pulmonary - normal exam   (+) sleep apnea,     ROS comment: Recently treated for pluersy  Cardiovascular - normal exam  Exercise tolerance: good (4-7 METS)    NYHA Classification: III  Patient on routine beta blocker and Beta blocker given within 24 hours of surgery    (+) hypertension well controlled, CAD, CABG (3-4 years ago), CHF,     ROS comment: Normal stress test 1/2018    Neuro/Psych  (+) syncope (two weeks ago. Holter pending),     GI/Hepatic/Renal/Endo    (+) obesity, morbid obesity, GERD well controlled,  renal disease CRI, diabetes mellitus type 2 poorly controlled using insulin,     Musculoskeletal     Abdominal    Substance History      OB/GYN          Other   (+) arthritis                       Anesthesia Plan    ASA 3     MAC     intravenous induction   Anesthetic plan, all risks, benefits, and alternatives have been provided, discussed and informed consent has been obtained with: patient.  Use of blood products discussed with patient .   Plan discussed with CRNA.

## 2019-02-11 NOTE — PROGRESS NOTES
"INFECTIOUS DISEASES PROGRESS NOTE    Patient:  Erick Luong  YOB: 1955  MRN: [id number]   Admit date: 2/7/2019   Admitting Physician: Ken Perry MD  Primary Care Physician: Del Shetty MD    Chief Complaint: abdominal pain        Interval History: Patient reports his abdominal pain is a little better today.  He has had some nausea as well but that is not new.    He denies any melena or hematochezia    He reports he is having colonoscopy tomorrow morning.    Continues to report no fever    Allergies:   Allergies   Allergen Reactions   • Bactrim [Sulfamethoxazole-Trimethoprim] Other (See Comments)     \"RENAL FAILURE\"       Current Meds:     Current Facility-Administered Medications:   •  dextrose (D50W) 25 g/ 50mL Intravenous Solution 25 g, 25 g, Intravenous, Q15 Min PRN, Raquel Duncan, APRN  •  dextrose (GLUTOSE) oral gel 15 g, 15 g, Oral, Q15 Min PRN, Raquel Duncan APRSHERRILL  •  DULoxetine (CYMBALTA) DR capsule 60 mg, 60 mg, Oral, Daily, Payam Keyes DO, 60 mg at 02/10/19 0806  •  enoxaparin (LOVENOX) syringe 30 mg, 30 mg, Subcutaneous, Q12H, Ken Perry MD, 30 mg at 02/10/19 0804  •  glucagon (human recombinant) (GLUCAGEN DIAGNOSTIC) injection 1 mg, 1 mg, Subcutaneous, PRN, Raquel Duncan, APRN  •  insulin lispro (humaLOG) injection 0-24 Units, 0-24 Units, Subcutaneous, 4x Daily With Meals & Nightly, Raquel Duncan APRN, 4 Units at 02/10/19 1716  •  isosorbide mononitrate (IMDUR) 24 hr tablet 30 mg, 30 mg, Oral, Daily, Raquel Duncan APRN, 30 mg at 02/10/19 0806  •  [START ON 2/11/2019] magnesium citrate solution 296 mL, 296 mL, Oral, Once, Justyna Barry, APRN  •  meropenem (MERREM) 1 g/100 mL 0.9% NS VTB (mbp), 1 g, Intravenous, Q8H, Julia Adrian MD, Last Rate: 0 mL/hr at 02/10/19 1235, 1 g at 02/10/19 1746  •  midodrine (PROAMATINE) tablet 10 mg, 10 mg, Oral, TID With Meals, Raquel Duncan, APRN, 10 mg at 02/10/19 1716  •  Morphine sulfate " "(PF) injection 4 mg, 4 mg, Intravenous, Q2H PRN, Ken Perry MD, 4 mg at 02/10/19 1126  •  ondansetron (ZOFRAN) 8 mg/50 mL NS IVPB, 8 mg, Intravenous, Q6H PRN, Ken Perry MD, 8 mg at 19 1746  •  ondansetron ODT (ZOFRAN-ODT) disintegrating tablet 8 mg, 8 mg, Oral, Q6H PRN, Ken Perry MD, 8 mg at 02/10/19 1815  •  pantoprazole (PROTONIX) EC tablet 40 mg, 40 mg, Oral, Daily, Raquel Duncan, APRN, 40 mg at 02/10/19 0804  •  Pharmacy Consult - Pharmacy to dose, , Does not apply, Continuous PRN, Julia Adrian MD  •  polyethylene glycol (MIRALAX) powder 17 g, 17 g, Oral, Daily, Justyna Barry, APRN, 17 g at 02/10/19 0805  •  rosuvastatin (CRESTOR) tablet 40 mg, 40 mg, Oral, Daily, Raquel Duncan, APRN, 40 mg at 02/10/19 0806  •  sodium chloride 0.9 % flush 1-10 mL, 1-10 mL, Intravenous, PRN, Ken Perry MD, 10 mL at 19 0102  •  sodium chloride 0.9 % flush 10 mL, 10 mL, Intravenous, PRN, Ken Perry MD, 10 mL at 19 0622  •  sodium chloride 0.9 % flush 3 mL, 3 mL, Intravenous, Q12H, Ken Perry MD, 3 mL at 02/10/19 0807  •  sodium chloride 0.9 % infusion, 50 mL/hr, Intravenous, Continuous, Payam Keyes DO, Last Rate: 50 mL/hr at 02/10/19 1711, 50 mL/hr at 02/10/19 171      Review of Systems   Constitutional: Negative for fever.   Gastrointestinal: Positive for abdominal pain.         Objective     Vital Signs:  Temp (24hrs), Av °F (36.7 °C), Min:97.9 °F (36.6 °C), Max:98.1 °F (36.7 °C)      /64 (BP Location: Right arm, Patient Position: Lying)   Pulse 65   Temp 98.1 °F (36.7 °C) (Oral)   Resp 18   Ht 182.9 cm (72\")   Wt (!) 143 kg (315 lb 9.6 oz)   SpO2 96%   BMI 42.80 kg/m²         Physical Exam   General: Nontoxic-appearing sitting in recliner in no acute distress   HEENT: Sclera anicteric and noninjected  Heart very distant heart tones  Respiratory: Effort even and unlabored  Abdomen:  soft, no rebound, no guarding   Psych: He " is pleasant cooperative  Neuro: Alert, oriented, speech is clear, follows commands    Line/IV site: Peripheral  IV forearm left, condition patent and no redness    Results Review:    I reviewed the patient's new clinical results.    Lab Results:  CBC:   Results from last 7 days   Lab 02/07/19  1742 02/08/19  0631 02/09/19  0630 02/10/19  0602   WBC 8.59 8.41 7.08 7.06   HEMOGLOBIN 13.1* 12.3* 12.0* 10.9*   HEMATOCRIT 38.7* 36.4* 36.4* 32.6*   PLATELETS 260 246 248 232   LYMPHOCYTE %  --  12.2*  --   --    MONOCYTES %  --  4.1  --   --          CMP:   Results from last 7 days   Lab 02/07/19  1742 02/08/19  0631 02/09/19  0629 02/10/19  0602   SODIUM 135 136 136 136   POTASSIUM 4.0 4.2 4.2 4.1   CHLORIDE 97* 97* 97* 100   CO2 24.0 29.0 30.0 28.0   BUN 13 16 17 14   CREATININE 1.40 1.41* 1.62* 1.37   CALCIUM 9.2 8.7 9.0 8.6   BILIRUBIN 0.9 0.9 1.1*  --    ALK PHOS 62 59 60  --    ALT (SGPT) 20 21 21  --    AST (SGOT) 34 29 31  --    GLUCOSE 204* 240* 205* 148*         Culture Results:        Microbiology Results Abnormal     Procedure Component Value - Date/Time    Blood Culture - Blood, Arm, Right [[id number]] Collected:  02/07/19 1800    Lab Status:  Preliminary result Specimen:  Blood from Arm, Right Updated:  02/10/19 1830     Blood Culture No growth at 3 days    Blood Culture - Blood, Arm, Left [[id number]] Collected:  02/07/19 1742    Lab Status:  Preliminary result Specimen:  Blood from Arm, Left Updated:  02/10/19 1800     Blood Culture No growth at 3 days              Radiology:   CT Abdomen Pelvis With Contrast [[id number]] Orestes as Reviewed   Order Status: Completed Collected: 02/08/19 1621    Updated: 02/08/19 1634   Narrative:     EXAMINATION:   CT ABDOMEN PELVIS W CONTRAST-  2/8/2019 4:21 PM CST     CT ABDOMEN PELVIS W CONTRAST- 2/8/2019 2:11 PM CST     HISTORY: Abd pain, gastroenteritis or colitis suspected;  K59.00-Constipation, unspecified; E87.2-Acidosis; R93.5-Abnormal  findings on diagnostic imaging of  other abdominal regions, including  retroperitoneum; R10.30-Lower abdominal pain, unspecified       COMPARISON: February 7, 2019     DOSE LENGTH PRODUCT: 1605 mGy cm. Automated exposure control was also  utilized to decrease patient radiation dose.     TECHNIQUE: Following the oral ingestion and intravenous administration  of contrast, helical CT tomographic images of the abdomen and pelvis  were acquired. Multiplanar reformatted images were provided for review.      FINDINGS:   Focal pleural thickening is present in the left chest.     LIVER: Portal venous air was described in the liver prior exam of  February 7. This is markedly improved. No residual portal venous air is  identified.      BILIARY SYSTEM: The gallbladder is surgically absent.      PANCREAS: No focal pancreatic lesion.      SPLEEN: Unremarkable.      KIDNEYS AND ADRENALS: The adrenal glands are visualized. The renal  contours demonstrate symmetric excretion of contrast. A low-density 3 cm  lesion is present posterior cortex the right kidney this may be a cyst  this is unchanged from March 27, 2017.. The ureters are decompressed and  normal in appearance.     RETROPERITONEUM: No mass, lymphadenopathy or hemorrhage.      GI TRACT: There is thickening the wall of the ascending colon. When  compared to prior study March 27 when portal air was present this  thickened wall of the ascending and transverse colon is most consistent  with ischemic colitis..    .     OTHER: There is no mesenteric mass, lymphadenopathy or fluid collection.  Vascular calcifications present in the abdominal aorta and iliac  arteries.. The osseous structures and soft tissues demonstrate no  worrisome lesions. There is no air in the portal venous system on this  exam.      PELVIS: No mass lesion, fluid collection or significant lymphadenopathy  is seen in the pelvis. The urinary bladder is normal in appearance.      Impression:     1. Previously noted mesenteric venous air and  portal venous air is  resolved. However, thickening the wall of the ascending and transverse  colons noted. This is a sequela of ischemic colitis..         This report was finalized on 02/08/2019 16:31 by Dr. Eddie Winter MD.         Imaging Results (last 72 hours)     Procedure Component Value Units Date/Time    CT Abdomen Pelvis With Contrast [[id number]] Collected:  02/08/19 1621     Updated:  02/08/19 1634    Narrative:       EXAMINATION:   CT ABDOMEN PELVIS W CONTRAST-  2/8/2019 4:21 PM CST     CT ABDOMEN PELVIS W CONTRAST- 2/8/2019 2:11 PM CST     HISTORY: Abd pain, gastroenteritis or colitis suspected;  K59.00-Constipation, unspecified; E87.2-Acidosis; R93.5-Abnormal  findings on diagnostic imaging of other abdominal regions, including  retroperitoneum; R10.30-Lower abdominal pain, unspecified       COMPARISON: February 7, 2019     DOSE LENGTH PRODUCT: 1605 mGy cm. Automated exposure control was also  utilized to decrease patient radiation dose.     TECHNIQUE: Following the oral ingestion and intravenous administration  of contrast, helical CT tomographic images of the abdomen and pelvis  were acquired. Multiplanar reformatted images were provided for review.      FINDINGS:   Focal pleural thickening is present in the left chest.     LIVER: Portal venous air was described in the liver prior exam of  February 7. This is markedly improved. No residual portal venous air is  identified.      BILIARY SYSTEM: The gallbladder is surgically absent.      PANCREAS: No focal pancreatic lesion.      SPLEEN: Unremarkable.      KIDNEYS AND ADRENALS: The adrenal glands are visualized. The renal  contours demonstrate symmetric excretion of contrast. A low-density 3 cm  lesion is present posterior cortex the right kidney this may be a cyst  this is unchanged from March 27, 2017.. The ureters are decompressed and  normal in appearance.     RETROPERITONEUM: No mass, lymphadenopathy or hemorrhage.      GI TRACT: There is  thickening the wall of the ascending colon. When  compared to prior study March 27 when portal air was present this  thickened wall of the ascending and transverse colon is most consistent  with ischemic colitis..    .     OTHER: There is no mesenteric mass, lymphadenopathy or fluid collection.  Vascular calcifications present in the abdominal aorta and iliac  arteries.. The osseous structures and soft tissues demonstrate no  worrisome lesions. There is no air in the portal venous system on this  exam.      PELVIS: No mass lesion, fluid collection or significant lymphadenopathy  is seen in the pelvis. The urinary bladder is normal in appearance.       Impression:       1. Previously noted mesenteric venous air and portal venous air is  resolved. However, thickening the wall of the ascending and transverse  colons noted. This is a sequela of ischemic colitis..         This report was finalized on 02/08/2019 16:31 by Dr. Eddie Winter MD.    CT Abdomen Pelvis Without Contrast [[id number]] Collected:  02/07/19 1942     Updated:  02/07/19 1955    Narrative:       EXAMINATION: CT ABDOMEN PELVIS WO CONTRAST-      2/7/2019 7:11 PM CST     HISTORY: Fall injury. Blunt trauma. Low abdominal pain.     In order to have a CT radiation dose as low as reasonably achievable  Automated Exposure Control was utilized for adjustment of the mA and/or  KV according to patient size.     DLP in mGycm= 1797.     Noncontrast abdomen pelvis CT compared with 1/9/2019.     Normal heart size.  CABG changes with coronary artery calcification.  No acute abnormality at the lung bases.     Trace amounts of air are noted within the hepatic parenchyma in the  subdiaphragmatic region. This has the appearance of intravenous air and  a few mesenteric veins adjacent to the stomach and transverse colon also  show small amounts of air. There is no colonic wall thickening to  indicate ischemia.     No portal vein or superior mesenteric vein area seen.      There is no free fluid.  No bowel dilation.     No pelvic mass or fluid.  There is moderate fecal material throughout the colon.  No diverticulitis or colitis.     Normal and symmetric adrenal glands and kidneys.  3 cm right renal cyst noted.     Summary:  1. Small amounts of mesenteric venous air within the upper abdomen and  small amounts of venous air within the liver. (Has this patient had  recent endoscopy or colonoscopy?)  2. No fluid, mass, or signs of bowel ischemia.                                   This report was finalized on 02/07/2019 19:51 by Dr. Gomez Mcgill MD.    CT Cervical Spine Without Contrast [[id number]] Collected:  02/07/19 1941     Updated:  02/07/19 1945    Narrative:       EXAMINATION: CT CERVICAL SPINE WO CONTRAST-      2/7/2019 7:11 PM CST     HISTORY: Fall injury. Neck pain.     In order to have a CT radiation dose as low as reasonably achievable  Automated Exposure Control was utilized for adjustment of the mA and/or  KV according to patient size.     DLP in mGycm= 253.     Axial, sagittal, and coronal noncontrast CT imaging.     Vertebral bodies and posterior elements are intact.     Reconstructed sagittal images show normal curvature.   There is no malalignment. Prevertebral soft tissues are normal.   Facet joints align normally.     Reconstructed coronal images show normal lateral mass alignment.     C6-7 discectomy with anterior plate and screw fusion.       Impression:       1. No acute bony abnormality.                 This report was finalized on 02/07/2019 19:42 by Dr. Gomez Mcgill MD.    CT Head Without Contrast [[id number]] Collected:  02/07/19 1941     Updated:  02/07/19 1944    Narrative:       EXAMINATION: CT HEAD WO CONTRAST-      2/7/2019 7:11 PM CST     HISTORY: fall, head injury     In order to have a CT radiation dose as low as reasonably achievable  Automated Exposure Control was utilized for adjustment of the mA and/or  KV according to patient size.     DLP in  mGycm= 818.     Axial, sagittal, and coronal noncontrast CT imaging of the head.     The visualized paranasal sinuses are clear.     The brain and ventricles have an age appropriate appearance.   There is no hemorrhage or mass-effect.   No acute infarction is seen.     No calvarial abnormality.       Impression:       1. No acute intracranial abnormality is seen.                                         This report was finalized on 02/07/2019 19:41 by Dr. Gomez Mcgill MD.    XR Chest 1 View [[id number]] Collected:  02/07/19 1832     Updated:  02/07/19 1835    Narrative:       EXAMINATION: XR CHEST 1 VW-     2/7/2019 6:16 PM CST     HISTORY: Short of breath.     1 view chest x-ray compared with 1/17/2018.     CABG changes.     Heart size is normal.  The mediastinum is within normal limits.      The lungs are normally expanded with no pneumonia or pneumothorax.       No congestive failure changes.                                                                       Impression:       1. No acute disease.        This report was finalized on 02/07/2019 18:32 by Dr. Gomez Mcgill MD.          Assessment/Plan     Active Hospital Problems    Diagnosis   • Abdominal pain   • Constipation   • Orthostatic hypotension   • Obesity, unspecified obesity severity, unspecified obesity type   • HTN (hypertension), benign   • Type 2 diabetes mellitus with hyperglycemia, with long-term current use of insulin (CMS/HCC)     hold insulin the am of procedure     • Chest pain, non-cardiac   • CAD (coronary artery disease)   • CHF (congestive heart failure) (CMS/HCC)       IMPRESSION:  1. Mid abdominal pain in patient with air in the mesenteric venous system/ liver on initial CT scan.  Etiology was uncertain.  There was concern for translocation of possible gas-forming bacteria given the patient's significant constipation/obstipation.  Follow-up CT scan shows that the  air in the portal vein has completely resolved.  Clinically, patient is  stable.    2. Thickening of the wall of the ascending and transverse colon-new on follow-up CT scan.  Reviewed Dr. NEMO Smith's note.  Thickening of the right colon may be secondary to decompressed right colon  3. Constipation-patient has had significant stool output since enemas and CT scan oral contrast.  4. Intermittent sweats-questionable etiology.  Patient without any documented fever here  5. Orthostatic hypotension-worked up as an outpatient by multiple people.  On midodrine.  6. Type 2 diabetes  7. Renal insufficiency- improved  8.   RECOMMENDATION:   · Continue empiric antibiotic therapy-l  · Glucose management per hospitalist service  ·   If colonoscopy is unremarkable and no other concerns for infection, I would be in favor of discontinuing IV antibiotic therapy.        Julia Adrian MD  02/10/19  7:51 PM

## 2019-02-12 LAB
BACTERIA SPEC AEROBE CULT: NORMAL
BACTERIA SPEC AEROBE CULT: NORMAL
GLUCOSE BLDC GLUCOMTR-MCNC: 175 MG/DL (ref 70–130)
GLUCOSE BLDC GLUCOMTR-MCNC: 184 MG/DL (ref 70–130)
GLUCOSE BLDC GLUCOMTR-MCNC: 256 MG/DL (ref 70–130)
GLUCOSE BLDC GLUCOMTR-MCNC: 301 MG/DL (ref 70–130)
UREASE TISS QL: NEGATIVE

## 2019-02-12 PROCEDURE — 82962 GLUCOSE BLOOD TEST: CPT

## 2019-02-12 PROCEDURE — 25010000002 MORPHINE PER 10 MG: Performed by: SPECIALIST

## 2019-02-12 PROCEDURE — 63710000001 INSULIN LISPRO (HUMAN) PER 5 UNITS: Performed by: NURSE PRACTITIONER

## 2019-02-12 PROCEDURE — 25010000002 MEROPENEM PER 100 MG: Performed by: INTERNAL MEDICINE

## 2019-02-12 PROCEDURE — 94799 UNLISTED PULMONARY SVC/PX: CPT

## 2019-02-12 PROCEDURE — 25010000002 ENOXAPARIN PER 10 MG: Performed by: SPECIALIST

## 2019-02-12 PROCEDURE — 99213 OFFICE O/P EST LOW 20 MIN: CPT | Performed by: INTERNAL MEDICINE

## 2019-02-12 PROCEDURE — 94760 N-INVAS EAR/PLS OXIMETRY 1: CPT

## 2019-02-12 PROCEDURE — G0378 HOSPITAL OBSERVATION PER HR: HCPCS

## 2019-02-12 RX ORDER — DULOXETIN HYDROCHLORIDE 30 MG/1
60 CAPSULE, DELAYED RELEASE ORAL DAILY
Status: DISCONTINUED | OUTPATIENT
Start: 2019-02-12 | End: 2019-02-12

## 2019-02-12 RX ORDER — OXYCODONE AND ACETAMINOPHEN 10; 325 MG/1; MG/1
1 TABLET ORAL EVERY 6 HOURS
Status: DISCONTINUED | OUTPATIENT
Start: 2019-02-12 | End: 2019-02-13 | Stop reason: HOSPADM

## 2019-02-12 RX ORDER — METHYLCELLULOSE 2 G/19G
2 POWDER, FOR SOLUTION ORAL DAILY
Qty: 60 G | Refills: 6 | Status: SHIPPED | OUTPATIENT
Start: 2019-02-12 | End: 2019-02-13 | Stop reason: SDUPTHER

## 2019-02-12 RX ORDER — COLCHICINE 0.6 MG/1
0.6 TABLET ORAL 2 TIMES DAILY PRN
Status: DISCONTINUED | OUTPATIENT
Start: 2019-02-12 | End: 2019-02-13 | Stop reason: HOSPADM

## 2019-02-12 RX ORDER — COSYNTROPIN 0.25 MG/ML
0.25 INJECTION, POWDER, FOR SOLUTION INTRAMUSCULAR; INTRAVENOUS ONCE
Status: COMPLETED | OUTPATIENT
Start: 2019-02-13 | End: 2019-02-13

## 2019-02-12 RX ORDER — LACTULOSE 10 G/10G
10 SOLUTION ORAL 3 TIMES DAILY
Qty: 30 EACH | Refills: 6 | Status: SHIPPED | OUTPATIENT
Start: 2019-02-12 | End: 2019-02-13 | Stop reason: SDUPTHER

## 2019-02-12 RX ORDER — ASPIRIN 81 MG/1
81 TABLET ORAL DAILY
Status: DISCONTINUED | OUTPATIENT
Start: 2019-02-12 | End: 2019-02-13 | Stop reason: HOSPADM

## 2019-02-12 RX ADMIN — ASPIRIN 81 MG: 81 TABLET, DELAYED RELEASE ORAL at 16:28

## 2019-02-12 RX ADMIN — DULOXETINE HYDROCHLORIDE 60 MG: 30 CAPSULE, DELAYED RELEASE ORAL at 08:45

## 2019-02-12 RX ADMIN — ONDANSETRON 8 MG: 8 TABLET, ORALLY DISINTEGRATING ORAL at 10:18

## 2019-02-12 RX ADMIN — MEROPENEM 1 G: 1 INJECTION, POWDER, FOR SOLUTION INTRAVENOUS at 00:05

## 2019-02-12 RX ADMIN — ISOSORBIDE MONONITRATE 30 MG: 30 TABLET, EXTENDED RELEASE ORAL at 08:45

## 2019-02-12 RX ADMIN — INSULIN LISPRO 4 UNITS: 100 INJECTION, SOLUTION INTRAVENOUS; SUBCUTANEOUS at 12:42

## 2019-02-12 RX ADMIN — OXYCODONE HYDROCHLORIDE AND ACETAMINOPHEN 1 TABLET: 10; 325 TABLET ORAL at 16:28

## 2019-02-12 RX ADMIN — MIDODRINE HYDROCHLORIDE 10 MG: 10 TABLET ORAL at 08:45

## 2019-02-12 RX ADMIN — ENOXAPARIN SODIUM 30 MG: 30 INJECTION SUBCUTANEOUS at 08:44

## 2019-02-12 RX ADMIN — ENOXAPARIN SODIUM 30 MG: 30 INJECTION SUBCUTANEOUS at 20:55

## 2019-02-12 RX ADMIN — INSULIN LISPRO 16 UNITS: 100 INJECTION, SOLUTION INTRAVENOUS; SUBCUTANEOUS at 20:55

## 2019-02-12 RX ADMIN — MIDODRINE HYDROCHLORIDE 10 MG: 10 TABLET ORAL at 17:47

## 2019-02-12 RX ADMIN — SODIUM CHLORIDE, PRESERVATIVE FREE 3 ML: 5 INJECTION INTRAVENOUS at 20:56

## 2019-02-12 RX ADMIN — POLYETHYLENE GLYCOL 3350 17 G: 17 POWDER, FOR SOLUTION ORAL at 08:44

## 2019-02-12 RX ADMIN — MIDODRINE HYDROCHLORIDE 10 MG: 10 TABLET ORAL at 12:46

## 2019-02-12 RX ADMIN — INSULIN LISPRO 12 UNITS: 100 INJECTION, SOLUTION INTRAVENOUS; SUBCUTANEOUS at 17:47

## 2019-02-12 RX ADMIN — INSULIN LISPRO 4 UNITS: 100 INJECTION, SOLUTION INTRAVENOUS; SUBCUTANEOUS at 08:53

## 2019-02-12 RX ADMIN — PANTOPRAZOLE SODIUM 40 MG: 40 TABLET, DELAYED RELEASE ORAL at 08:45

## 2019-02-12 RX ADMIN — MEROPENEM 1 G: 1 INJECTION, POWDER, FOR SOLUTION INTRAVENOUS at 08:53

## 2019-02-12 RX ADMIN — MORPHINE SULFATE 4 MG: 4 INJECTION, SOLUTION INTRAMUSCULAR; INTRAVENOUS at 03:16

## 2019-02-12 RX ADMIN — OXYCODONE HYDROCHLORIDE AND ACETAMINOPHEN 1 TABLET: 10; 325 TABLET ORAL at 20:55

## 2019-02-12 RX ADMIN — ROSUVASTATIN CALCIUM 40 MG: 20 TABLET, FILM COATED ORAL at 08:45

## 2019-02-12 NOTE — PROGRESS NOTES
LOS: 0 days   Patient Care Team:  Del Shetty MD as PCP - General (Family Medicine)  Juan Chua DO as PCP - Claims Attributed  Emeka Garay MD as Cardiologist (Cardiology)  Cristopher Hannah MD as Consulting Physician (Gastroenterology)  Brian Lomas MD as Consulting Physician (Nephrology)    Chief Complaint: Abdominal pain and air in the mesenteric venous system  Subjective     Subjective     Hospital day #5 from admission from severe abdominal pain and notation of air in the mesenteric venous system.  He has slowly improved he had massive amounts of constipation with this resolved as well as Merrem his abdominal pain has resolved he has had multiple bowel movements, he is feeling much better he had upper and lower endoscopy upper endoscopy unremarkable colonoscopy with minimal amounts of infectious type colitis definitely no ischemic colitis.  Unimpressed with the inflammation at that time.  But still believe his pain was real and his air in the venous system was real.  Objective      Objective     Vital Signs  Temp:  [97.6 °F (36.4 °C)-98.1 °F (36.7 °C)] 97.9 °F (36.6 °C)  Heart Rate:  [63-81] 65  Resp:  [14-20] 16  BP: (116-153)/(43-76) 116/48    Intake & Output (last 3 days)       02/09 0701 - 02/10 0700 02/10 0701 - 02/11 0700 02/11 0701 - 02/12 0700 02/12 0701 - 02/13 0700    P.O.        I.V. (mL/kg) 1022 (7.1)  300 (2.1)     IV Piggyback 700 100 100     Total Intake(mL/kg) 1722 (12) 100 (0.7) 400 (2.8)     Urine (mL/kg/hr) 1325 (0.4) 1775 (0.5) 700 (0.2)     Stool 0 0      Total Output 1325 1775 700     Net +397 -1675 -300             Urine Unmeasured Occurrence   1 x     Stool Unmeasured Occurrence 1 x 4 x 1 x           Physical Exam:     General Appearance:    Alert, cooperative, in no acute distress   Lungs:     Clear to auscultation,respirations regular, even and                  unlabored    Heart:    Regular rhythm and normal rate, normal S1 and S2, no            murmur, no  gallop, no rub, no click   Chest Wall:    No abnormalities observed   Abdomen:    Obese abdomen, occasional bowel sounds, soft nontender.  No masses, white count is 7 hematocrit 33.  Electrolytes within normal limits liver functions are normal.        Results Review:     I reviewed the patient's new clinical results.  I reviewed the patient's new imaging results and agree with the interpretation.    Results from last 7 days   Lab Units 02/10/19  0602 02/09/19  0630 02/08/19  0631   WBC 10*3/mm3 7.06 7.08 8.41   HEMOGLOBIN g/dL 10.9* 12.0* 12.3*   HEMATOCRIT % 32.6* 36.4* 36.4*   PLATELETS 10*3/mm3 232 248 246        Results from last 7 days   Lab Units 02/10/19  0602 02/09/19  0629 02/08/19  0631 02/07/19  1742   SODIUM mmol/L 136 136 136 135   POTASSIUM mmol/L 4.1 4.2 4.2 4.0   CHLORIDE mmol/L 100 97* 97* 97*   CO2 mmol/L 28.0 30.0 29.0 24.0   BUN mg/dL 14 17 16 13   CREATININE mg/dL 1.37 1.62* 1.41* 1.40   CALCIUM mg/dL 8.6 9.0 8.7 9.2   BILIRUBIN mg/dL  --  1.1* 0.9 0.9   ALK PHOS U/L  --  60 59 62   ALT (SGPT) U/L  --  21 21 20   AST (SGOT) U/L  --  31 29 34   GLUCOSE mg/dL 148* 205* 240* 204*       Assessment/Plan     Assessment/Plan       Lower abdominal pain    Chronic diastolic congestive heart failure (CMS/HCC)    CAD (coronary artery disease)    Obesity, unspecified obesity severity, unspecified obesity type    HTN (hypertension), benign    Type 2 diabetes mellitus with hyperglycemia, with long-term current use of insulin (CMS/HCC)    Chronic kidney disease, stage III (moderate) (CMS/HCC)    Orthostatic hypotension    Abdominal pain    Constipation    Ischemic colitis (CMS/HCC)      Patient with these multitude of problems, continue antibiotics, look toward discharge on Wednesday if continued improvement occurs.      Ken Perry MD  02/12/19  7:18 AM      Time: time spent with patient 15 minutes     EMR Dragon/Transcription disclaimer: Much of this encounter note is an electronic  transcription/translation of spoken language to printed text. The electronic translation of spoken language may permit erroneous, or at times, nonsensical words or phrases to be inadvertently transcribed; although I have reviewed the note for such errors, some may still exist.

## 2019-02-12 NOTE — PLAN OF CARE
Problem: Patient Care Overview  Goal: Plan of Care Review  Outcome: Ongoing (interventions implemented as appropriate)   02/12/19 0414   Coping/Psychosocial   Plan of Care Reviewed With patient   Plan of Care Review   Progress no change   OTHER   Outcome Summary Pt c/o neck pain. Prn pain med given as ordered with some relief noted. IV abx cont. VSS. Voiding per urinal. Will continue to monitor.      Goal: Individualization and Mutuality  Outcome: Ongoing (interventions implemented as appropriate)    Goal: Discharge Needs Assessment  Outcome: Ongoing (interventions implemented as appropriate)    Goal: Interprofessional Rounds/Family Conf  Outcome: Ongoing (interventions implemented as appropriate)      Problem: Fall Risk (Adult)  Goal: Absence of Fall  Outcome: Ongoing (interventions implemented as appropriate)      Problem: Nausea/Vomiting (Adult)  Goal: Symptom Relief  Outcome: Ongoing (interventions implemented as appropriate)    Goal: Adequate Hydration  Outcome: Ongoing (interventions implemented as appropriate)      Problem: Constipation (Adult)  Goal: Effective Bowel Elimination  Outcome: Ongoing (interventions implemented as appropriate)    Goal: Comfort  Outcome: Ongoing (interventions implemented as appropriate)      Problem: Skin Injury Risk (Adult)  Goal: Skin Health and Integrity  Outcome: Ongoing (interventions implemented as appropriate)

## 2019-02-12 NOTE — PROGRESS NOTES
"INFECTIOUS DISEASES PROGRESS NOTE    Patient:  Erick Luong  YOB: 1955  MRN: [id number]   Admit date: 2/7/2019   Admitting Physician: Ken Perry MD  Primary Care Physician: Del Shetty MD    Chief Complaint: abdominal pain        Interval History: Patient reports his abdominal pain is gone today.  He states once he got \"all cleaned out for the colonoscopy\" his pain was gone    Continues to report no fever    Allergies:   Allergies   Allergen Reactions   • Bactrim [Sulfamethoxazole-Trimethoprim] Other (See Comments)     \"RENAL FAILURE\"       Current Meds:     Current Facility-Administered Medications:   •  dextrose (D50W) 25 g/ 50mL Intravenous Solution 25 g, 25 g, Intravenous, Q15 Min PRN, Raquel Duncan APRN  •  dextrose (GLUTOSE) oral gel 15 g, 15 g, Oral, Q15 Min PRN, Raquel Duncan APRN  •  DULoxetine (CYMBALTA) DR capsule 60 mg, 60 mg, Oral, Daily, Payam Keyes DO, 60 mg at 02/10/19 0806  •  enoxaparin (LOVENOX) syringe 30 mg, 30 mg, Subcutaneous, Q12H, Ken Perry MD, Stopped at 02/10/19 2020  •  glucagon (human recombinant) (GLUCAGEN DIAGNOSTIC) injection 1 mg, 1 mg, Subcutaneous, PRN, Raquel Duncna, APRN  •  insulin lispro (humaLOG) injection 0-24 Units, 0-24 Units, Subcutaneous, 4x Daily With Meals & Nightly, Raquel Duncan APRN, 8 Units at 02/11/19 1836  •  isosorbide mononitrate (IMDUR) 24 hr tablet 30 mg, 30 mg, Oral, Daily, Raquel Duncan APRN, 30 mg at 02/11/19 0821  •  meropenem (MERREM) 1 g/100 mL 0.9% NS VTB (mbp), 1 g, Intravenous, Q8H, Julia Adrian MD, Last Rate: 0 mL/hr at 02/11/19 1045, 1 g at 02/11/19 1836  •  midodrine (PROAMATINE) tablet 10 mg, 10 mg, Oral, TID With Meals, Raquel Duncan, APRN, 10 mg at 02/11/19 1836  •  Morphine sulfate (PF) injection 4 mg, 4 mg, Intravenous, Q2H PRN, Ken Perry MD, 4 mg at 02/10/19 2257  •  ondansetron (ZOFRAN) 8 mg/50 mL NS IVPB, 8 mg, Intravenous, Q6H PRN, Ken Perry, " "MD, 8 mg at 19 1746  •  ondansetron ODT (ZOFRAN-ODT) disintegrating tablet 8 mg, 8 mg, Oral, Q6H PRN, Ken Perry MD, 8 mg at 02/10/19 1815  •  pantoprazole (PROTONIX) EC tablet 40 mg, 40 mg, Oral, Daily, Raquel Duncan, APRN, 40 mg at 19 0821  •  Pharmacy Consult - Pharmacy to dose, , Does not apply, Continuous PRN, Julia Adrian MD  •  polyethylene glycol (MIRALAX) powder 17 g, 17 g, Oral, Daily, Justyna Barry, APRN, 17 g at 02/10/19 0805  •  rosuvastatin (CRESTOR) tablet 40 mg, 40 mg, Oral, Daily, Raquel Duncan, APRN, 40 mg at 19 0822  •  sodium chloride 0.9 % flush 1-10 mL, 1-10 mL, Intravenous, PRN, Ken Perry MD, 10 mL at 19 0102  •  sodium chloride 0.9 % flush 10 mL, 10 mL, Intravenous, PRN, Ken Perry MD, 10 mL at 19 0622  •  sodium chloride 0.9 % flush 3 mL, 3 mL, Intravenous, Q12H, Ken Perry MD, 3 mL at 02/10/19 0807  •  sodium chloride 0.9 % infusion 500 mL, 500 mL, Intravenous, Continuous PRN, Chacha Torres MD  •  sodium chloride 0.9 % infusion, 50 mL/hr, Intravenous, Continuous, Payam Keyes DO, Last Rate: 50 mL/hr at 19 1837, 50 mL/hr at 19 183      Review of Systems   Constitutional: Negative for fever.   Gastrointestinal: Negative for abdominal pain.         Objective     Vital Signs:  Temp (24hrs), Av.1 °F (36.7 °C), Min:97.6 °F (36.4 °C), Max:98.4 °F (36.9 °C)      /43 (BP Location: Left arm, Patient Position: Lying) Comment: nurse notified  Pulse 70   Temp 97.9 °F (36.6 °C) (Oral)   Resp 16   Ht 182.9 cm (72\")   Wt (!) 144 kg (317 lb 6.4 oz)   SpO2 98%   BMI 43.05 kg/m²         Physical Exam   General: Nontoxic-appearing sitting up at bedside in no acute distress   HEENT: Sclera anicteric and noninjected  Respiratory: Effort even and unlabored  Abdomen: Obese, soft, no rebound, no guarding   Psych: He is pleasant cooperative  Neuro: Alert, oriented, speech is clear, follows " commands    Line/IV site: Peripheral  IV forearm right, condition patent and no redness    Results Review:    I reviewed the patient's new clinical results.    Lab Results:  CBC:   Results from last 7 days   Lab 02/07/19 1742 02/08/19 0631 02/09/19  0630 02/10/19  0602   WBC 8.59 8.41 7.08 7.06   HEMOGLOBIN 13.1* 12.3* 12.0* 10.9*   HEMATOCRIT 38.7* 36.4* 36.4* 32.6*   PLATELETS 260 246 248 232   LYMPHOCYTE %  --  12.2*  --   --    MONOCYTES %  --  4.1  --   --          CMP:   Results from last 7 days   Lab 02/07/19 1742 02/08/19 0631 02/09/19  0629 02/10/19  0602   SODIUM 135 136 136 136   POTASSIUM 4.0 4.2 4.2 4.1   CHLORIDE 97* 97* 97* 100   CO2 24.0 29.0 30.0 28.0   BUN 13 16 17 14   CREATININE 1.40 1.41* 1.62* 1.37   CALCIUM 9.2 8.7 9.0 8.6   BILIRUBIN 0.9 0.9 1.1*  --    ALK PHOS 62 59 60  --    ALT (SGPT) 20 21 21  --    AST (SGOT) 34 29 31  --    GLUCOSE 204* 240* 205* 148*         Culture Results:        Microbiology Results Abnormal     Procedure Component Value - Date/Time    Blood Culture - Blood, Arm, Right [[id number]] Collected:  02/07/19 1800    Lab Status:  Preliminary result Specimen:  Blood from Arm, Right Updated:  02/11/19 1830     Blood Culture No growth at 4 days    Blood Culture - Blood, Arm, Left [[id number]] Collected:  02/07/19 1742    Lab Status:  Preliminary result Specimen:  Blood from Arm, Left Updated:  02/11/19 1800     Blood Culture No growth at 4 days    Gastrointestinal Panel, PCR - Stool, Per Rectum [[id number]]  (Normal) Collected:  02/11/19 1256    Lab Status:  Final result Specimen:  Stool from Per Rectum Updated:  02/11/19 3400     Campylobacter Not Detected     Clostridium difficile (toxin A/B) Not Detected     Plesiomonas shigelloides Not Detected     Salmonella Not Detected     Vibrio Not Detected     Vibrio cholerae Not Detected     Yersinia enterocolitica Not Detected     Enteroaggregative E. coli (EAEC) Not Detected     Enteropathogenic E. coli (EPEC) Not Detected      Enterotoxigenic E. coli (ETEC) lt/st Not Detected     Shiga-like toxin-producing E. coli (STEC) stx1/stx2 Not Detected     E. coli O157 Not Detected     Shigella/Enteroinvasive E. coli (EIEC) Not Detected     Cryptosporidium Not Detected     Cyclospora cayetanensis Not Detected     Entamoeba histolytica Not Detected     Giardia lamblia Not Detected     Adenovirus F40/41 Not Detected     Astrovirus Not Detected     Norovirus GI/GII Not Detected     Rotavirus A Not Detected     Sapovirus (I, II, IV or V) Not Detected    Narrative:       If Aeromonas, Staphylococcus aureus or Bacillus cereus are suspected, please order [id number]: Stool Culture, Aeromonas, S aureus, B Cereus.              Radiology:   CT Abdomen Pelvis With Contrast [[id number]] Orestes as Reviewed   Order Status: Completed Collected: 02/08/19 1621    Updated: 02/08/19 1634   Narrative:     EXAMINATION:   CT ABDOMEN PELVIS W CONTRAST-  2/8/2019 4:21 PM CST     CT ABDOMEN PELVIS W CONTRAST- 2/8/2019 2:11 PM CST     HISTORY: Abd pain, gastroenteritis or colitis suspected;  K59.00-Constipation, unspecified; E87.2-Acidosis; R93.5-Abnormal  findings on diagnostic imaging of other abdominal regions, including  retroperitoneum; R10.30-Lower abdominal pain, unspecified       COMPARISON: February 7, 2019     DOSE LENGTH PRODUCT: 1605 mGy cm. Automated exposure control was also  utilized to decrease patient radiation dose.     TECHNIQUE: Following the oral ingestion and intravenous administration  of contrast, helical CT tomographic images of the abdomen and pelvis  were acquired. Multiplanar reformatted images were provided for review.      FINDINGS:   Focal pleural thickening is present in the left chest.     LIVER: Portal venous air was described in the liver prior exam of  February 7. This is markedly improved. No residual portal venous air is  identified.      BILIARY SYSTEM: The gallbladder is surgically absent.      PANCREAS: No focal pancreatic lesion.       SPLEEN: Unremarkable.      KIDNEYS AND ADRENALS: The adrenal glands are visualized. The renal  contours demonstrate symmetric excretion of contrast. A low-density 3 cm  lesion is present posterior cortex the right kidney this may be a cyst  this is unchanged from March 27, 2017.. The ureters are decompressed and  normal in appearance.     RETROPERITONEUM: No mass, lymphadenopathy or hemorrhage.      GI TRACT: There is thickening the wall of the ascending colon. When  compared to prior study March 27 when portal air was present this  thickened wall of the ascending and transverse colon is most consistent  with ischemic colitis..    .     OTHER: There is no mesenteric mass, lymphadenopathy or fluid collection.  Vascular calcifications present in the abdominal aorta and iliac  arteries.. The osseous structures and soft tissues demonstrate no  worrisome lesions. There is no air in the portal venous system on this  exam.      PELVIS: No mass lesion, fluid collection or significant lymphadenopathy  is seen in the pelvis. The urinary bladder is normal in appearance.      Impression:     1. Previously noted mesenteric venous air and portal venous air is  resolved. However, thickening the wall of the ascending and transverse  colons noted. This is a sequela of ischemic colitis..         This report was finalized on 02/08/2019 16:31 by Dr. Eddie Winter MD.         Imaging Results (last 72 hours)     Procedure Component Value Units Date/Time    CT Abdomen Pelvis With Contrast [[id number]] Collected:  02/08/19 1621     Updated:  02/08/19 1634    Narrative:       EXAMINATION:   CT ABDOMEN PELVIS W CONTRAST-  2/8/2019 4:21 PM CST     CT ABDOMEN PELVIS W CONTRAST- 2/8/2019 2:11 PM CST     HISTORY: Abd pain, gastroenteritis or colitis suspected;  K59.00-Constipation, unspecified; E87.2-Acidosis; R93.5-Abnormal  findings on diagnostic imaging of other abdominal regions, including  retroperitoneum; R10.30-Lower abdominal pain,  unspecified       COMPARISON: February 7, 2019     DOSE LENGTH PRODUCT: 1605 mGy cm. Automated exposure control was also  utilized to decrease patient radiation dose.     TECHNIQUE: Following the oral ingestion and intravenous administration  of contrast, helical CT tomographic images of the abdomen and pelvis  were acquired. Multiplanar reformatted images were provided for review.      FINDINGS:   Focal pleural thickening is present in the left chest.     LIVER: Portal venous air was described in the liver prior exam of  February 7. This is markedly improved. No residual portal venous air is  identified.      BILIARY SYSTEM: The gallbladder is surgically absent.      PANCREAS: No focal pancreatic lesion.      SPLEEN: Unremarkable.      KIDNEYS AND ADRENALS: The adrenal glands are visualized. The renal  contours demonstrate symmetric excretion of contrast. A low-density 3 cm  lesion is present posterior cortex the right kidney this may be a cyst  this is unchanged from March 27, 2017.. The ureters are decompressed and  normal in appearance.     RETROPERITONEUM: No mass, lymphadenopathy or hemorrhage.      GI TRACT: There is thickening the wall of the ascending colon. When  compared to prior study March 27 when portal air was present this  thickened wall of the ascending and transverse colon is most consistent  with ischemic colitis..    .     OTHER: There is no mesenteric mass, lymphadenopathy or fluid collection.  Vascular calcifications present in the abdominal aorta and iliac  arteries.. The osseous structures and soft tissues demonstrate no  worrisome lesions. There is no air in the portal venous system on this  exam.      PELVIS: No mass lesion, fluid collection or significant lymphadenopathy  is seen in the pelvis. The urinary bladder is normal in appearance.       Impression:       1. Previously noted mesenteric venous air and portal venous air is  resolved. However, thickening the wall of the ascending and  transverse  colons noted. This is a sequela of ischemic colitis..         This report was finalized on 02/08/2019 16:31 by Dr. Eddie Winter MD.    CT Abdomen Pelvis Without Contrast [[id number]] Collected:  02/07/19 1942     Updated:  02/07/19 1955    Narrative:       EXAMINATION: CT ABDOMEN PELVIS WO CONTRAST-      2/7/2019 7:11 PM CST     HISTORY: Fall injury. Blunt trauma. Low abdominal pain.     In order to have a CT radiation dose as low as reasonably achievable  Automated Exposure Control was utilized for adjustment of the mA and/or  KV according to patient size.     DLP in mGycm= 1797.     Noncontrast abdomen pelvis CT compared with 1/9/2019.     Normal heart size.  CABG changes with coronary artery calcification.  No acute abnormality at the lung bases.     Trace amounts of air are noted within the hepatic parenchyma in the  subdiaphragmatic region. This has the appearance of intravenous air and  a few mesenteric veins adjacent to the stomach and transverse colon also  show small amounts of air. There is no colonic wall thickening to  indicate ischemia.     No portal vein or superior mesenteric vein area seen.     There is no free fluid.  No bowel dilation.     No pelvic mass or fluid.  There is moderate fecal material throughout the colon.  No diverticulitis or colitis.     Normal and symmetric adrenal glands and kidneys.  3 cm right renal cyst noted.     Summary:  1. Small amounts of mesenteric venous air within the upper abdomen and  small amounts of venous air within the liver. (Has this patient had  recent endoscopy or colonoscopy?)  2. No fluid, mass, or signs of bowel ischemia.                                   This report was finalized on 02/07/2019 19:51 by Dr. Gomez Mcgill MD.    CT Cervical Spine Without Contrast [[id number]] Collected:  02/07/19 1941     Updated:  02/07/19 1945    Narrative:       EXAMINATION: CT CERVICAL SPINE WO CONTRAST-      2/7/2019 7:11 PM CST     HISTORY: Fall injury.  Neck pain.     In order to have a CT radiation dose as low as reasonably achievable  Automated Exposure Control was utilized for adjustment of the mA and/or  KV according to patient size.     DLP in mGycm= 253.     Axial, sagittal, and coronal noncontrast CT imaging.     Vertebral bodies and posterior elements are intact.     Reconstructed sagittal images show normal curvature.   There is no malalignment. Prevertebral soft tissues are normal.   Facet joints align normally.     Reconstructed coronal images show normal lateral mass alignment.     C6-7 discectomy with anterior plate and screw fusion.       Impression:       1. No acute bony abnormality.                 This report was finalized on 02/07/2019 19:42 by Dr. Gomez Mcgill MD.    CT Head Without Contrast [[id number]] Collected:  02/07/19 1941     Updated:  02/07/19 1944    Narrative:       EXAMINATION: CT HEAD WO CONTRAST-      2/7/2019 7:11 PM CST     HISTORY: fall, head injury     In order to have a CT radiation dose as low as reasonably achievable  Automated Exposure Control was utilized for adjustment of the mA and/or  KV according to patient size.     DLP in mGycm= 818.     Axial, sagittal, and coronal noncontrast CT imaging of the head.     The visualized paranasal sinuses are clear.     The brain and ventricles have an age appropriate appearance.   There is no hemorrhage or mass-effect.   No acute infarction is seen.     No calvarial abnormality.       Impression:       1. No acute intracranial abnormality is seen.                                         This report was finalized on 02/07/2019 19:41 by Dr. Gomez Mcgill MD.    XR Chest 1 View [[id number]] Collected:  02/07/19 1832     Updated:  02/07/19 1835    Narrative:       EXAMINATION: XR CHEST 1 VW-     2/7/2019 6:16 PM CST     HISTORY: Short of breath.     1 view chest x-ray compared with 1/17/2018.     CABG changes.     Heart size is normal.  The mediastinum is within normal limits.      The  lungs are normally expanded with no pneumonia or pneumothorax.       No congestive failure changes.                                                                       Impression:       1. No acute disease.        This report was finalized on 02/07/2019 18:32 by Dr. Gomez Mcgill MD.          Assessment/Plan     Active Hospital Problems    Diagnosis   • **Lower abdominal pain   • Ischemic colitis (CMS/HCC)   • Abdominal pain   • Constipation   • Orthostatic hypotension   • Chronic kidney disease, stage III (moderate) (CMS/HCC)   • Obesity, unspecified obesity severity, unspecified obesity type   • HTN (hypertension), benign   • Type 2 diabetes mellitus with hyperglycemia, with long-term current use of insulin (CMS/HCC)     hold insulin the am of procedure     • CAD (coronary artery disease)   • Chronic diastolic congestive heart failure (CMS/HCC)       IMPRESSION:  1. Mid abdominal pain in patient with air in the mesenteric venous system/ liver on initial CT scan.  Etiology was uncertain.  There was concern for translocation of possible gas-forming bacteria given the patient's significant constipation/obstipation.  Follow-up CT scan shows that the  air in the portal vein has completely resolved.  Clinically, the patient's abdominal pain has resolved  2. Thickening of the wall of the ascending and transverse colon-new on follow-up CT scan.  Reviewed Dr. Smith's note.  Thickening of the right colon may be secondary to decompressed right colon  3. Constipation-patient has had significant stool output since enemas and CT scan oral contrast as well as colonoscopy prep  4. Intermittent sweats-questionable etiology.  Patient without any documented fever here  5. Orthostatic hypotension-worked up as an outpatient by multiple people.  On midodrine.  6. Type 2 diabetes  7. Renal insufficiency- improved  8.   RECOMMENDATION:   · We will discontinue meropenem after dose in the morning.  Discussed with Dr. Smith.  He is  underwhelmed with the minimal abnormal mucosal findings on the colonoscopy and GI PCR is negative.  · Glucose management per hospitalist service      Julia Adrian MD  02/11/19  7:51 PM

## 2019-02-12 NOTE — PROGRESS NOTES
"INFECTIOUS DISEASES PROGRESS NOTE    Patient:  Erick Luong  YOB: 1955  MRN: [id number]   Admit date: 2/7/2019   Admitting Physician: Ken Perry MD  Primary Care Physician: Del Shetty MD    Chief Complaint: abdominal pain        Interval History: Patient reports his abdominal pain is still gone today.  He just \"does not feel well\".  Very difficult to extract some specific history from him.  He stated he did not sleep well.  He specifically stated he did sleep well for 4-5 hours then woke up.  He states he cannot go back to sleep.  He was not really able to put his finger on why he cannot go back to sleep    He did state that his neck was hurting some which is a chronic issue and then he had a headache.  He reports he was medicated for that.    He does not have any specific joint complaints.  He did state he was very nauseated this morning when he got his breakfast tray.  He reports he was not nauseated last night and ate some of his dinner.  His wife clarified that he only had pudding.    Continues to report no fever    He received his last dose of meropenem this morning.  Of note he began feeling worse again in the middle of the night    His wife is very concerned stating that symptoms just not right.    Allergies:   Allergies   Allergen Reactions   • Bactrim [Sulfamethoxazole-Trimethoprim] Other (See Comments)     \"RENAL FAILURE\"       Current Meds:     Current Facility-Administered Medications:   •  dextrose (D50W) 25 g/ 50mL Intravenous Solution 25 g, 25 g, Intravenous, Q15 Min PRN, Raquel Duncan, APRN  •  dextrose (GLUTOSE) oral gel 15 g, 15 g, Oral, Q15 Min PRN, Raqeul Duncan, APRN  •  DULoxetine (CYMBALTA) DR capsule 60 mg, 60 mg, Oral, Daily, Payam Keyes DO, 60 mg at 02/12/19 0845  •  enoxaparin (LOVENOX) syringe 30 mg, 30 mg, Subcutaneous, Q12H, Ken Perry MD, 30 mg at 02/12/19 0844  •  glucagon (human recombinant) (GLUCAGEN DIAGNOSTIC) injection 1 mg, 1 " mg, Subcutaneous, PRN, Raquel Duncan, APRN  •  insulin lispro (humaLOG) injection 0-24 Units, 0-24 Units, Subcutaneous, 4x Daily With Meals & Nightly, Raquel Duncan APRN, 4 Units at 02/12/19 1242  •  isosorbide mononitrate (IMDUR) 24 hr tablet 30 mg, 30 mg, Oral, Daily, Raquel Duncan APRN, 30 mg at 02/12/19 0845  •  midodrine (PROAMATINE) tablet 10 mg, 10 mg, Oral, TID With Meals, Raquel Duncan APRN, 10 mg at 02/12/19 1246  •  Morphine sulfate (PF) injection 4 mg, 4 mg, Intravenous, Q2H PRN, Ken Perry MD, 4 mg at 02/12/19 0316  •  ondansetron (ZOFRAN) 8 mg/50 mL NS IVPB, 8 mg, Intravenous, Q6H PRN, Kne Perry MD, 8 mg at 02/09/19 1746  •  ondansetron ODT (ZOFRAN-ODT) disintegrating tablet 8 mg, 8 mg, Oral, Q6H PRN, Ken Perry MD, 8 mg at 02/12/19 1018  •  pantoprazole (PROTONIX) EC tablet 40 mg, 40 mg, Oral, Daily, Raquel Duncan APRN, 40 mg at 02/12/19 0845  •  Pharmacy Consult - Pharmacy to dose, , Does not apply, Continuous PRN, Julia Adrian MD  •  polyethylene glycol (MIRALAX) powder 17 g, 17 g, Oral, Daily, Justyna Barry, APRN, 17 g at 02/12/19 0844  •  rosuvastatin (CRESTOR) tablet 40 mg, 40 mg, Oral, Daily, Raquel Duncan APRN, 40 mg at 02/12/19 0845  •  sodium chloride 0.9 % flush 1-10 mL, 1-10 mL, Intravenous, PRN, Ken Perry MD, 10 mL at 02/09/19 0102  •  sodium chloride 0.9 % flush 10 mL, 10 mL, Intravenous, PRN, Ken Perry MD, 10 mL at 02/09/19 0622  •  sodium chloride 0.9 % flush 3 mL, 3 mL, Intravenous, Q12H, Ken Perry MD, 3 mL at 02/10/19 0807  •  sodium chloride 0.9 % infusion 500 mL, 500 mL, Intravenous, Continuous PRN, Chacha Torres MD  •  sodium chloride 0.9 % infusion, 50 mL/hr, Intravenous, Continuous, Payam Keyes DO, Last Rate: 50 mL/hr at 02/12/19 0944, 50 mL/hr at 02/12/19 0944      Review of Systems   Constitutional: Negative for fever.   Gastrointestinal: Negative for abdominal pain.  "        Objective     Vital Signs:  Temp (24hrs), Av.9 °F (36.6 °C), Min:97.9 °F (36.6 °C), Max:98 °F (36.7 °C)      /62 (BP Location: Left arm, Patient Position: Sitting)   Pulse 62   Temp 98 °F (36.7 °C) (Oral)   Resp 16   Ht 182.9 cm (72\")   Wt (!) 144 kg (317 lb 6.4 oz)   SpO2 100%   BMI 43.05 kg/m²         Physical Exam   General: Nontoxic-appearing sitting up in recliner in no acute distress.His wife is at bedside  HEENT: Sclera anicteric and noninjected  Respiratory: Effort even and unlabored  Heart: S1-S2 sounds are distant  Abdomen: Obese, soft, no rebound, no guarding   Psych: He is pleasant cooperative  Neuro: Alert, oriented, speech is clear, follows commands    Line/IV site: Peripheral  IV forearm right, condition patent and no redness    Results Review:    I reviewed the patient's new clinical results.    Lab Results:  CBC:   Results from last 7 days   Lab 19  0630 02/10/19  0602   WBC 8.59 8.41 7.08 7.06   HEMOGLOBIN 13.1* 12.3* 12.0* 10.9*   HEMATOCRIT 38.7* 36.4* 36.4* 32.6*   PLATELETS 260 246 248 232   LYMPHOCYTE %  --  12.2*  --   --    MONOCYTES %  --  4.1  --   --          CMP:   Results from last 7 days   Lab 19  0629 02/10/19  0602   SODIUM 135 136 136 136   POTASSIUM 4.0 4.2 4.2 4.1   CHLORIDE 97* 97* 97* 100   CO2 24.0 29.0 30.0 28.0   BUN 13 16 17 14   CREATININE 1.40 1.41* 1.62* 1.37   CALCIUM 9.2 8.7 9.0 8.6   BILIRUBIN 0.9 0.9 1.1*  --    ALK PHOS 62 59 60  --    ALT (SGPT) 20 21 21  --    AST (SGOT) 34 29 31  --    GLUCOSE 204* 240* 205* 148*         Culture Results:        Microbiology Results Abnormal     Procedure Component Value - Date/Time    Blood Culture - Blood, Arm, Right [[id number]] Collected:  19 1800    Lab Status:  Preliminary result Specimen:  Blood from Arm, Right Updated:  19 1830     Blood Culture No growth at 4 days    Blood Culture - Blood, Arm, Left [[id number]] " Collected:  02/07/19 1742    Lab Status:  Preliminary result Specimen:  Blood from Arm, Left Updated:  02/11/19 1800     Blood Culture No growth at 4 days    Gastrointestinal Panel, PCR - Stool, Per Rectum [[id number]]  (Normal) Collected:  02/11/19 1256    Lab Status:  Final result Specimen:  Stool from Per Rectum Updated:  02/11/19 1554     Campylobacter Not Detected     Clostridium difficile (toxin A/B) Not Detected     Plesiomonas shigelloides Not Detected     Salmonella Not Detected     Vibrio Not Detected     Vibrio cholerae Not Detected     Yersinia enterocolitica Not Detected     Enteroaggregative E. coli (EAEC) Not Detected     Enteropathogenic E. coli (EPEC) Not Detected     Enterotoxigenic E. coli (ETEC) lt/st Not Detected     Shiga-like toxin-producing E. coli (STEC) stx1/stx2 Not Detected     E. coli O157 Not Detected     Shigella/Enteroinvasive E. coli (EIEC) Not Detected     Cryptosporidium Not Detected     Cyclospora cayetanensis Not Detected     Entamoeba histolytica Not Detected     Giardia lamblia Not Detected     Adenovirus F40/41 Not Detected     Astrovirus Not Detected     Norovirus GI/GII Not Detected     Rotavirus A Not Detected     Sapovirus (I, II, IV or V) Not Detected    Narrative:       If Aeromonas, Staphylococcus aureus or Bacillus cereus are suspected, please order [id number]: Stool Culture, Aeromonas, S aureus, B Cereus.              Radiology:         Imaging Results (last 72 hours)     Procedure Component Value Units Date/Time    CT Abdomen Pelvis With Contrast [[id number]] Collected:  02/08/19 1621     Updated:  02/08/19 1634    Narrative:       EXAMINATION:   CT ABDOMEN PELVIS W CONTRAST-  2/8/2019 4:21 PM CST     CT ABDOMEN PELVIS W CONTRAST- 2/8/2019 2:11 PM CST     HISTORY: Abd pain, gastroenteritis or colitis suspected;  K59.00-Constipation, unspecified; E87.2-Acidosis; R93.5-Abnormal  findings on diagnostic imaging of other abdominal regions, including  retroperitoneum;  R10.30-Lower abdominal pain, unspecified       COMPARISON: February 7, 2019     DOSE LENGTH PRODUCT: 1605 mGy cm. Automated exposure control was also  utilized to decrease patient radiation dose.     TECHNIQUE: Following the oral ingestion and intravenous administration  of contrast, helical CT tomographic images of the abdomen and pelvis  were acquired. Multiplanar reformatted images were provided for review.      FINDINGS:   Focal pleural thickening is present in the left chest.     LIVER: Portal venous air was described in the liver prior exam of  February 7. This is markedly improved. No residual portal venous air is  identified.      BILIARY SYSTEM: The gallbladder is surgically absent.      PANCREAS: No focal pancreatic lesion.      SPLEEN: Unremarkable.      KIDNEYS AND ADRENALS: The adrenal glands are visualized. The renal  contours demonstrate symmetric excretion of contrast. A low-density 3 cm  lesion is present posterior cortex the right kidney this may be a cyst  this is unchanged from March 27, 2017.. The ureters are decompressed and  normal in appearance.     RETROPERITONEUM: No mass, lymphadenopathy or hemorrhage.      GI TRACT: There is thickening the wall of the ascending colon. When  compared to prior study March 27 when portal air was present this  thickened wall of the ascending and transverse colon is most consistent  with ischemic colitis..    .     OTHER: There is no mesenteric mass, lymphadenopathy or fluid collection.  Vascular calcifications present in the abdominal aorta and iliac  arteries.. The osseous structures and soft tissues demonstrate no  worrisome lesions. There is no air in the portal venous system on this  exam.      PELVIS: No mass lesion, fluid collection or significant lymphadenopathy  is seen in the pelvis. The urinary bladder is normal in appearance.       Impression:       1. Previously noted mesenteric venous air and portal venous air is  resolved. However, thickening  the wall of the ascending and transverse  colons noted. This is a sequela of ischemic colitis..         This report was finalized on 02/08/2019 16:31 by Dr. Eddie Winter MD.    CT Abdomen Pelvis Without Contrast [[id number]] Collected:  02/07/19 1942     Updated:  02/07/19 1955    Narrative:       EXAMINATION: CT ABDOMEN PELVIS WO CONTRAST-      2/7/2019 7:11 PM CST     HISTORY: Fall injury. Blunt trauma. Low abdominal pain.     In order to have a CT radiation dose as low as reasonably achievable  Automated Exposure Control was utilized for adjustment of the mA and/or  KV according to patient size.     DLP in mGycm= 1797.     Noncontrast abdomen pelvis CT compared with 1/9/2019.     Normal heart size.  CABG changes with coronary artery calcification.  No acute abnormality at the lung bases.     Trace amounts of air are noted within the hepatic parenchyma in the  subdiaphragmatic region. This has the appearance of intravenous air and  a few mesenteric veins adjacent to the stomach and transverse colon also  show small amounts of air. There is no colonic wall thickening to  indicate ischemia.     No portal vein or superior mesenteric vein area seen.     There is no free fluid.  No bowel dilation.     No pelvic mass or fluid.  There is moderate fecal material throughout the colon.  No diverticulitis or colitis.     Normal and symmetric adrenal glands and kidneys.  3 cm right renal cyst noted.     Summary:  1. Small amounts of mesenteric venous air within the upper abdomen and  small amounts of venous air within the liver. (Has this patient had  recent endoscopy or colonoscopy?)  2. No fluid, mass, or signs of bowel ischemia.                                   This report was finalized on 02/07/2019 19:51 by Dr. Gomez Mcgill MD.    CT Cervical Spine Without Contrast [[id number]] Collected:  02/07/19 1941     Updated:  02/07/19 1945    Narrative:       EXAMINATION: CT CERVICAL SPINE WO CONTRAST-      2/7/2019 7:11 PM  CST     HISTORY: Fall injury. Neck pain.     In order to have a CT radiation dose as low as reasonably achievable  Automated Exposure Control was utilized for adjustment of the mA and/or  KV according to patient size.     DLP in mGycm= 253.     Axial, sagittal, and coronal noncontrast CT imaging.     Vertebral bodies and posterior elements are intact.     Reconstructed sagittal images show normal curvature.   There is no malalignment. Prevertebral soft tissues are normal.   Facet joints align normally.     Reconstructed coronal images show normal lateral mass alignment.     C6-7 discectomy with anterior plate and screw fusion.       Impression:       1. No acute bony abnormality.                 This report was finalized on 02/07/2019 19:42 by Dr. Gomez Mcgill MD.    CT Head Without Contrast [[id number]] Collected:  02/07/19 1941     Updated:  02/07/19 1944    Narrative:       EXAMINATION: CT HEAD WO CONTRAST-      2/7/2019 7:11 PM CST     HISTORY: fall, head injury     In order to have a CT radiation dose as low as reasonably achievable  Automated Exposure Control was utilized for adjustment of the mA and/or  KV according to patient size.     DLP in mGycm= 818.     Axial, sagittal, and coronal noncontrast CT imaging of the head.     The visualized paranasal sinuses are clear.     The brain and ventricles have an age appropriate appearance.   There is no hemorrhage or mass-effect.   No acute infarction is seen.     No calvarial abnormality.       Impression:       1. No acute intracranial abnormality is seen.                                         This report was finalized on 02/07/2019 19:41 by Dr. Gomez Mcgill MD.    XR Chest 1 View [[id number]] Collected:  02/07/19 1832     Updated:  02/07/19 1835    Narrative:       EXAMINATION: XR CHEST 1 VW-     2/7/2019 6:16 PM CST     HISTORY: Short of breath.     1 view chest x-ray compared with 1/17/2018.     CABG changes.     Heart size is normal.  The mediastinum is  within normal limits.      The lungs are normally expanded with no pneumonia or pneumothorax.       No congestive failure changes.                                                                       Impression:       1. No acute disease.        This report was finalized on 02/07/2019 18:32 by Dr. Gomez Mcgill MD.          Assessment/Plan     Active Hospital Problems    Diagnosis   • **Lower abdominal pain   • Ischemic colitis (CMS/HCC)   • Abdominal pain   • Constipation   • Orthostatic hypotension   • Chronic kidney disease, stage III (moderate) (CMS/HCC)   • Obesity, unspecified obesity severity, unspecified obesity type   • HTN (hypertension), benign   • Type 2 diabetes mellitus with hyperglycemia, with long-term current use of insulin (CMS/HCC)     hold insulin the am of procedure     • CAD (coronary artery disease)   • Chronic diastolic congestive heart failure (CMS/HCC)       IMPRESSION:  1. Mid abdominal pain in patient with air in the mesenteric venous system/ liver on initial CT scan.  Etiology was uncertain.  There was concern for translocation of possible gas-forming bacteria given the patient's significant constipation/obstipation.  Follow-up CT scan shows that the  air in the portal vein has completely resolved.  Clinically, the patient's abdominal pain has resolved  2. Thickening of the wall of the ascending and transverse colon-new on follow-up CT scan.  Reviewed Dr. Smith's note.  Thickening of the right colon may be secondary to decompressed right colon  3. Constipation-patient has had significant stool output since enemas and CT scan oral contrast as well as colonoscopy prep  4. Orthostatic hypotension-worked up as an outpatient by multiple people.  On midodrine.  5. Type 2 diabetes  6. Renal insufficiency- improved      RECOMMENDATION:   Ordered a Cortrosyn stim test from the morning.  Discussed with patient and his wife.  The patient to write down any specific complaints/symptoms if he could  come up with them.  Explained to the patient and his wife that it is very difficult to try to piece some things together as far as a diagnosis with nonspecific complaints.    Julia Adrian MD  02/12/19  1:17 PM

## 2019-02-12 NOTE — PROGRESS NOTES
[facility] Medicine Services  INPATIENT PROGRESS NOTE    Patient Name: Erick Luong  Date of Admission: 2/7/2019  Today's Date: 02/12/19  Length of Stay: 0  Primary Care Physician: Del Shetty MD    Subjective   Chief Complaint: Consult for hypertension and diabetes.  HPI   Patient underwent EGD and colonoscopy yesterday with Dr. Smith.         He feels better today with no current complaints of abdominal pain.  He had some minor nausea earlier this morning, but no vomiting.    Review of Systems   All pertinent negatives and positives are as above. All other systems have been reviewed and are negative unless otherwise stated.     Objective    Temp:  [97.9 °F (36.6 °C)-98 °F (36.7 °C)] 98 °F (36.7 °C)  Heart Rate:  [62-70] 62  Resp:  [16] 16  BP: (115-137)/(43-62) 115/62  Physical Exam  Constitutional: He is oriented to person, place, and time. He appears well-developed and well-nourished. Up in the chair. No distress.  Nontoxic.  No family currently present. Discussed with his nurse, Farhana.   Head: Normocephalic and atraumatic.   Eyes: Conjunctivae and EOM are normal. Pupils are equal, round, and reactive to light.   Neck: Neck supple. No JVD present.   Cardiovascular: Normal rate, regular rhythm, normal heart sounds and intact distal pulses. Exam reveals no gallop and no friction rub.   No murmur heard.  Pulmonary/Chest: Effort normal and breath sounds normal. No respiratory distress. He has no wheezes. He has no rales. He exhibits no tenderness.   Abdominal: Soft. Bowel sounds are normal. He exhibits no distension. There is no tenderness. There is no rebound and no guarding. His abdominal exam is benign at this point.   Musculoskeletal: Normal range of motion. He exhibits edema (trace). He exhibits no tenderness or deformity.   Neurological: He is alert and oriented to person, place, and time. He displays normal reflexes. No cranial nerve deficit. He exhibits normal  muscle tone.   Skin: Skin is warm and dry. No rash noted.   Psychiatric: He has a normal mood and affect. His behavior is normal. Judgment and thought content normal.    Results Review:  Collected  Updated  Procedure  Result Status      02/11/2019 1256  02/11/2019 1554  Gastrointestinal Panel, PCR - Stool, Per Rectum [[id number]]    Stool from Per Rectum     Final result  Campylobacter Not Detected   Clostridium difficile (toxin A/B) Not Detected   Plesiomonas shigelloides Not Detected   Salmonella Not Detected   Vibrio Not Detected   Vibrio cholerae Not Detected   Yersinia enterocolitica Not Detected   Enteroaggregative E. coli (EAEC) Not Detected   Enteropathogenic E. coli (EPEC) Not Detected   Enterotoxigenic E. coli (ETEC) lt/st Not Detected   Shiga-like toxin-producing E. coli (STEC) stx1/stx2 Not Detected   E. coli O157 Not Detected   Shigella/Enteroinvasive E. coli (EIEC) Not Detected   Cryptosporidium Not Detected   Cyclospora cayetanensis Not Detected   Entamoeba histolytica Not Detected   Giardia lamblia Not Detected   Adenovirus F40/41 Not Detected   Astrovirus Not Detected   Norovirus GI/GII Not Detected   Rotavirus A Not Detected   Sapovirus Not Detected        I have reviewed the patient's current medications.     Assessment/Plan     Active Hospital Problems    Diagnosis   • **Lower abdominal pain   • Ischemic colitis (CMS/HCC)   • Abdominal pain   • Constipation   • Orthostatic hypotension   • Chronic kidney disease, stage III (moderate) (CMS/HCC)   • Obesity, unspecified obesity severity, unspecified obesity type   • HTN (hypertension), benign   • Type 2 diabetes mellitus with hyperglycemia, with long-term current use of insulin (CMS/HCC)     hold insulin the am of procedure     • CAD (coronary artery disease)   • Chronic diastolic congestive heart failure (CMS/HCC)     Plan:   The patient is admitted to Dr. Perry's service.  Internal medicine, gastroenterology, and infectious disease are also  following.     He presented on 2/7 with complaints of lower abdominal pain.  He has problems with chronic constipation.  CT scan noted gas in the portal system of unknown etiology.  There was concern of possible translocation of bacteria.  He was started on broad-spectrum antibiotics. Blood cultures show no growth to date.    GI PCR panel was negative.  Infectious disease and GI have agreed with stopping his antibiotic coverage.  EGD and colonoscopy reports as per HPI.       It is quite possible that the patient suffered an episode of ischemic colitis with dropping of his blood pressure last week.  This could have caused the phenomenon seen on CT.     We are primarily following for diabetes and essential hypertension/orthostatic hypotension. Dr. Lomas recently started him on midodrine.  His blood pressure has been largely acceptable with no supine hypertension. His blood sugars have also been largely acceptable with some improvement over the last 24 hours.    He is back on a regular diet.  Stop IV fluids.    Lovenox has been ordered for DVT prophylaxis by general surgery.    Discharge Planning: Per general surgery.   Likely tomorrow.     Timbo Adame,    02/12/19   2:34 PM

## 2019-02-12 NOTE — PLAN OF CARE
Problem: Patient Care Overview  Goal: Plan of Care Review  Outcome: Ongoing (interventions implemented as appropriate)   02/12/19 0586   Coping/Psychosocial   Plan of Care Reviewed With patient   Plan of Care Review   Progress improving   OTHER   Outcome Summary PT c/o pain throughout shift. Medicated with Po pain meds. Relief noted. Safety maintained. IV IID this shift per MD order. Cont to montior. Plan for d/c home tomorrow.      Goal: Individualization and Mutuality  Outcome: Ongoing (interventions implemented as appropriate)    Goal: Discharge Needs Assessment  Outcome: Ongoing (interventions implemented as appropriate)    Goal: Interprofessional Rounds/Family Conf  Outcome: Ongoing (interventions implemented as appropriate)      Problem: Fall Risk (Adult)  Goal: Absence of Fall  Outcome: Ongoing (interventions implemented as appropriate)      Problem: Nausea/Vomiting (Adult)  Goal: Symptom Relief  Outcome: Ongoing (interventions implemented as appropriate)    Goal: Adequate Hydration  Outcome: Ongoing (interventions implemented as appropriate)      Problem: Constipation (Adult)  Goal: Effective Bowel Elimination  Outcome: Ongoing (interventions implemented as appropriate)    Goal: Comfort  Outcome: Ongoing (interventions implemented as appropriate)      Problem: Skin Injury Risk (Adult)  Goal: Skin Health and Integrity  Outcome: Ongoing (interventions implemented as appropriate)

## 2019-02-12 NOTE — PROGRESS NOTES
"      [facility] Gastroenterology  Inpatient Progress Note     TODAY'S DATE: 02/12/19  Erick Luong  1955      Reason for Follow Up:  Abdominal pain       Subjective:   No bm this am, no abdominal pain at present. Has light nausea but no vomiting.   No fever.     Vss, afebrile   On merrem and protonix.   On solid diet.  He tells me he was able to eat his lunch without trouble.    Allergies   Allergen Reactions   • Bactrim [Sulfamethoxazole-Trimethoprim] Other (See Comments)     \"RENAL FAILURE\"       Current Facility-Administered Medications:   •  [START ON 2/13/2019] cosyntropin (CORTROSYN) injection 0.25 mg, 0.25 mg, Intravenous, Once, Julia Adrian MD  •  dextrose (D50W) 25 g/ 50mL Intravenous Solution 25 g, 25 g, Intravenous, Q15 Min PRN, Raquel Duncan APRN  •  dextrose (GLUTOSE) oral gel 15 g, 15 g, Oral, Q15 Min PRN, Raquel Duncan APRN  •  DULoxetine (CYMBALTA) DR capsule 60 mg, 60 mg, Oral, Daily, Payam Keyes DO, 60 mg at 02/12/19 0845  •  enoxaparin (LOVENOX) syringe 30 mg, 30 mg, Subcutaneous, Q12H, Ken Perry MD, 30 mg at 02/12/19 0844  •  glucagon (human recombinant) (GLUCAGEN DIAGNOSTIC) injection 1 mg, 1 mg, Subcutaneous, PRN, Raquel Duncan APRN  •  insulin lispro (humaLOG) injection 0-24 Units, 0-24 Units, Subcutaneous, 4x Daily With Meals & Nightly, Raquel Duncan APRN, 4 Units at 02/12/19 1242  •  isosorbide mononitrate (IMDUR) 24 hr tablet 30 mg, 30 mg, Oral, Daily, Raquel Duncan APRN, 30 mg at 02/12/19 0845  •  midodrine (PROAMATINE) tablet 10 mg, 10 mg, Oral, TID With Meals, Raquel Duncan APRN, 10 mg at 02/12/19 1246  •  Morphine sulfate (PF) injection 4 mg, 4 mg, Intravenous, Q2H PRN, Ken Perry MD, 4 mg at 02/12/19 0316  •  ondansetron (ZOFRAN) 8 mg/50 mL NS IVPB, 8 mg, Intravenous, Q6H PRN, Ken Perry MD, 8 mg at 02/09/19 0795  •  ondansetron ODT (ZOFRAN-ODT) disintegrating tablet 8 mg, 8 mg, Oral, Q6H PRN, [place], " Ken MELÉNDEZ MD, 8 mg at 02/12/19 1018  •  pantoprazole (PROTONIX) EC tablet 40 mg, 40 mg, Oral, Daily, Raquel Duncan, APRN, 40 mg at 02/12/19 0845  •  Pharmacy Consult - Pharmacy to dose, , Does not apply, Continuous PRN, Julia Adrian MD  •  polyethylene glycol (MIRALAX) powder 17 g, 17 g, Oral, Daily, Justyna Barry, APRN, 17 g at 02/12/19 0844  •  rosuvastatin (CRESTOR) tablet 40 mg, 40 mg, Oral, Daily, Raquel Duncan, APRSHERRILL, 40 mg at 02/12/19 0845  •  sodium chloride 0.9 % flush 1-10 mL, 1-10 mL, Intravenous, PRN, Ken Perry MD, 10 mL at 02/09/19 0102  •  sodium chloride 0.9 % flush 10 mL, 10 mL, Intravenous, PRN, Ken Perry MD, 10 mL at 02/09/19 0622  •  sodium chloride 0.9 % flush 3 mL, 3 mL, Intravenous, Q12H, Ken Perry MD, 3 mL at 02/10/19 0807  •  sodium chloride 0.9 % infusion 500 mL, 500 mL, Intravenous, Continuous PRN, Chacha Torres MD  •  sodium chloride 0.9 % infusion, 50 mL/hr, Intravenous, Continuous, Payam Keyes DO, Last Rate: 50 mL/hr at 02/12/19 1352, 50 mL/hr at 02/12/19 1352    Review of Systems   Constitutional: Negative for chills and fever.   Respiratory: Negative for cough, shortness of breath and wheezing.    Cardiovascular: Negative for chest pain and palpitations.   Gastrointestinal: Positive for nausea. Negative for abdominal distention, abdominal pain, anal bleeding, blood in stool, constipation, diarrhea and vomiting.       Objective     Vital Signs  Temp:  [97.9 °F (36.6 °C)-98 °F (36.7 °C)] 98 °F (36.7 °C)  Heart Rate:  [62-70] 62  Resp:  [16] 16  BP: (115-137)/(43-62) 115/62  Body mass index is 43.05 kg/m².    Intake/Output Summary (Last 24 hours) at 2/12/2019 1426  Last data filed at 2/12/2019 0853  Gross per 24 hour   Intake 100 ml   Output 700 ml   Net -600 ml     I/O this shift:  In: 100 [IV Piggyback:100]  Out: -        PHYSICAL EXAM  Physical Exam   Constitutional: He appears well-developed and well-nourished. No distress.    Cardiovascular: Normal rate, regular rhythm and normal heart sounds.   Pulmonary/Chest: Effort normal and breath sounds normal.   Abdominal: Soft. Bowel sounds are normal. He exhibits no distension. There is no tenderness.   Musculoskeletal: He exhibits no edema.   Neurological: He is alert.   Skin: Skin is warm and dry.   Psychiatric: He has a normal mood and affect. His behavior is normal.   Vitals reviewed.          Results Review:   I have reviewed all of the patient's current test results    Results from last 7 days   Lab Units 02/10/19  0602 02/09/19  0630 02/08/19  0631   WBC 10*3/mm3 7.06 7.08 8.41   HEMOGLOBIN g/dL 10.9* 12.0* 12.3*   HEMATOCRIT % 32.6* 36.4* 36.4*   PLATELETS 10*3/mm3 232 248 246       Results from last 7 days   Lab Units 02/10/19  0602 02/09/19  0629 02/08/19  0631 02/07/19  1742   SODIUM mmol/L 136 136 136 135   POTASSIUM mmol/L 4.1 4.2 4.2 4.0   CHLORIDE mmol/L 100 97* 97* 97*   CO2 mmol/L 28.0 30.0 29.0 24.0   BUN mg/dL 14 17 16 13   CREATININE mg/dL 1.37 1.62* 1.41* 1.40   CALCIUM mg/dL 8.6 9.0 8.7 9.2   BILIRUBIN mg/dL  --  1.1* 0.9 0.9   ALK PHOS U/L  --  60 59 62   ALT (SGPT) U/L  --  21 21 20   AST (SGOT) U/L  --  31 29 34   GLUCOSE mg/dL 148* 205* 240* 204*       Results from last 7 days   Lab Units 02/08/19  0631   INR  0.96       Lab Results   Lab Value Date/Time    LIPASE 25 02/08/2019 0631    LIPASE 16 (L) 02/07/2019 1742    LIPASE 38 01/09/2019 1120    LIPASE 42 05/23/2017 0532    LIPASE 76 04/23/2017 2003    LIPASE 197 04/13/2017 1702    LIPASE 60 03/29/2017 0602    LIPASE 115 03/28/2017 0352    LIPASE 603 (H) 03/27/2017 0003    LIPASE 108 12/22/2016 0536    LIPASE 257 (H) 12/21/2016 0256    LIPASE 29 09/19/2016 1802    LIPASE 11 (L) 08/29/2016 1923    LIPASE 264 (H) 06/11/2015 0144    LIPASE 120 02/28/2014 1558    LIPASE 57 02/27/2014 1110       Radiology Review:  Imaging Results (last 24 hours)     ** No results found for the last 24 hours. **          egd 2-11-19   -  Normal esophagus.  - Gastritis.  - Normal examined duodenum.  - Biopsies were taken with a cold forceps for Helicobacter pylori testing.  - Nothing found to account for symptoms.   hyplori pending.       Colonoscopy 2-11-19  - The examined portion of the ileum was normal.  - Patchy mild inflammation was found in the transverse colon, in the ascending colon and in the cecum  secondary to colitis. This had the appearance of a patchy infectious colits rather than the typical  appearance of ischemic colitis. Biopsied.  - Internal hemorrhoids.  - The examination was otherwise normal on direct and retroflexion views.  Colon biopsies pending .     Stool GIP negative.     Assessment/Plan     Patient Active Problem List   Diagnosis Code   • Lower abdominal pain R10.30   • SIRS (systemic inflammatory response syndrome) (CMS/[facility] located [facility]) R65.10   • Fever, unknown origin R50.9   • Viral gastroenteritis A08.4   • Chronic diastolic congestive heart failure (CMS/[facility]) I50.32   • CAD (coronary artery disease) I25.10   • Diabetes mellitus (CMS/[facility]) E11.9   • Hypertension I10   • Sleep apnea G47.30   • Arthritis M19.90   • Mixed hyperlipidemia E78.2   • Shortness of breath R06.02   • Acute pancreatitis K85.90   • Chest pain, non-cardiac R07.89   • Obesity, unspecified obesity severity, unspecified obesity type E66.9   • HTN (hypertension), benign I10   • Epigastric pain R10.13   • Type 2 diabetes mellitus with hyperglycemia, with long-term current use of insulin (CMS/[facility]) E11.65, Z79.4   • Pleuritic chest pain R07.81   • Slow transit constipation K59.01   • Nausea and vomiting R11.2   • Chronic kidney disease, stage III (moderate) (CMS/[facility]) N18.3   • Nonsmoker Z78.9   • Orthostatic hypotension I95.1   • Abdominal pain R10.9   • Constipation K59.00   • Ischemic colitis (CMS/[facility]) K55.9       1. Abnormal ct abd/pelvis   ( Small amounts of mesenteric venous air within the      upper abdomen and  small amounts of venous air within the liver).  Repeat ct showed portal air resolved.   2. Abdominal pain -resolved at present.   3. N/v -no further vomiting.   4.  Obstipation-resolved    Overall he is improving. egd showed gastritis but nothing to account for his symptoms. Urea is negative. Recommend continue protonix daily.      Colonoscopy showed inflammation which had the appearance of infectious colitis rather than ischemic colitis. Colon biopsies pending.  Has been on merrem with last dose this am. GIP panel negative. Anticipate home in am if doing good.       I discussed the patients findings and my recommendations with patient      EMR Dragon/transcription disclaimer:  Much of this encounter note is electronic transcription/translation of spoken language to printed text.  The electronic translation of spoken language may be erroneous, or at times, nonsensical words or phrases may be inadvertently transcribed.  Although I have reviewed the note for such errors, some may still exist.      Justyna DAVIS  10:43 AM    · I have seen the patient personally with evaluation and plan of care reviewed and developed with APRN and nursing staff. Where indicated, I have addended and/or modified the above history of present illness, physical examination, and assessment and plan to reflect my findings and impressions. Essential elements of the care plan were discussed with APRN above.  Agree with findings and assessment/plan as documented above.    He overall is improving.  Okay to stop antibiotics from GI standpoint.  Okay to discharge when okay with others.  Advised MiraLAX daily at home and titrate the dose to help with his chronic constipation.  I favor MiraLAX over lactulose.    Tyrell Smith MD  02/12/19  2:26 PM

## 2019-02-13 ENCOUNTER — TELEPHONE (OUTPATIENT)
Dept: CARDIOLOGY | Facility: CLINIC | Age: 64
End: 2019-02-13

## 2019-02-13 VITALS
BODY MASS INDEX: 42.66 KG/M2 | OXYGEN SATURATION: 94 % | DIASTOLIC BLOOD PRESSURE: 61 MMHG | RESPIRATION RATE: 16 BRPM | SYSTOLIC BLOOD PRESSURE: 140 MMHG | TEMPERATURE: 97.8 F | HEIGHT: 72 IN | HEART RATE: 61 BPM | WEIGHT: 315 LBS

## 2019-02-13 LAB
ALBUMIN SERPL-MCNC: 3.4 G/DL (ref 3.5–5)
ALBUMIN/GLOB SERPL: 1.4 G/DL (ref 1.1–2.5)
ALP SERPL-CCNC: 54 U/L (ref 24–120)
ALT SERPL W P-5'-P-CCNC: 24 U/L (ref 0–54)
ANION GAP SERPL CALCULATED.3IONS-SCNC: 5 MMOL/L (ref 4–13)
AST SERPL-CCNC: 27 U/L (ref 7–45)
BASOPHILS # BLD MANUAL: 0.06 10*3/MM3 (ref 0–0.2)
BASOPHILS NFR BLD AUTO: 1 % (ref 0–2)
BILIRUB SERPL-MCNC: 0.5 MG/DL (ref 0.1–1)
BUN BLD-MCNC: 12 MG/DL (ref 5–21)
BUN/CREAT SERPL: 10.6 (ref 7–25)
CALCIUM SPEC-SCNC: 8.6 MG/DL (ref 8.4–10.4)
CHLORIDE SERPL-SCNC: 102 MMOL/L (ref 98–110)
CO2 SERPL-SCNC: 27 MMOL/L (ref 24–31)
CREAT BLD-MCNC: 1.13 MG/DL (ref 0.5–1.4)
DEPRECATED RDW RBC AUTO: 45.6 FL (ref 40–54)
EOSINOPHIL # BLD MANUAL: 0.45 10*3/MM3 (ref 0–0.7)
EOSINOPHIL NFR BLD MANUAL: 7 % (ref 0–4)
ERYTHROCYTE [DISTWIDTH] IN BLOOD BY AUTOMATED COUNT: 14 % (ref 12–15)
ERYTHROCYTE [SEDIMENTATION RATE] IN BLOOD: 18 MM/HR (ref 0–15)
GFR SERPL CREATININE-BSD FRML MDRD: 66 ML/MIN/1.73
GIANT PLATELETS: ABNORMAL
GLOBULIN UR ELPH-MCNC: 2.5 GM/DL
GLUCOSE BLD-MCNC: 197 MG/DL (ref 70–100)
GLUCOSE BLDC GLUCOMTR-MCNC: 212 MG/DL (ref 70–130)
GLUCOSE BLDC GLUCOMTR-MCNC: 263 MG/DL (ref 70–130)
GLUCOSE BLDC GLUCOMTR-MCNC: 330 MG/DL (ref 70–130)
HCT VFR BLD AUTO: 32.1 % (ref 40–52)
HGB BLD-MCNC: 10.9 G/DL (ref 14–18)
LYMPHOCYTES # BLD MANUAL: 1.27 10*3/MM3 (ref 0.72–4.86)
LYMPHOCYTES NFR BLD MANUAL: 20 % (ref 15–45)
LYMPHOCYTES NFR BLD MANUAL: 7 % (ref 4–12)
MCH RBC QN AUTO: 30.3 PG (ref 28–32)
MCHC RBC AUTO-ENTMCNC: 34 G/DL (ref 33–36)
MCV RBC AUTO: 89.2 FL (ref 82–95)
METAMYELOCYTES NFR BLD MANUAL: 1 % (ref 0–0)
MONOCYTES # BLD AUTO: 0.45 10*3/MM3 (ref 0.19–1.3)
NEUTROPHILS # BLD AUTO: 3.88 10*3/MM3 (ref 1.87–8.4)
NEUTROPHILS NFR BLD MANUAL: 61 % (ref 39–78)
PLATELET # BLD AUTO: 246 10*3/MM3 (ref 130–400)
PMV BLD AUTO: 11 FL (ref 6–12)
POLYCHROMASIA BLD QL SMEAR: ABNORMAL
POTASSIUM BLD-SCNC: 4.1 MMOL/L (ref 3.5–5.3)
PROT SERPL-MCNC: 5.9 G/DL (ref 6.3–8.7)
RBC # BLD AUTO: 3.6 10*6/MM3 (ref 4.8–5.9)
SODIUM BLD-SCNC: 134 MMOL/L (ref 135–145)
VARIANT LYMPHS NFR BLD MANUAL: 3 % (ref 0–5)
WBC MORPH BLD: NORMAL
WBC NRBC COR # BLD: 6.36 10*3/MM3 (ref 4.8–10.8)

## 2019-02-13 PROCEDURE — 25010000002 COSYNTROPIN PER 0.25 MG: Performed by: INTERNAL MEDICINE

## 2019-02-13 PROCEDURE — 94799 UNLISTED PULMONARY SVC/PX: CPT

## 2019-02-13 PROCEDURE — 85027 COMPLETE CBC AUTOMATED: CPT | Performed by: INTERNAL MEDICINE

## 2019-02-13 PROCEDURE — 85007 BL SMEAR W/DIFF WBC COUNT: CPT | Performed by: INTERNAL MEDICINE

## 2019-02-13 PROCEDURE — 94760 N-INVAS EAR/PLS OXIMETRY 1: CPT

## 2019-02-13 PROCEDURE — 63710000001 INSULIN LISPRO (HUMAN) PER 5 UNITS: Performed by: NURSE PRACTITIONER

## 2019-02-13 PROCEDURE — 82533 TOTAL CORTISOL: CPT | Performed by: INTERNAL MEDICINE

## 2019-02-13 PROCEDURE — 80053 COMPREHEN METABOLIC PANEL: CPT | Performed by: INTERNAL MEDICINE

## 2019-02-13 PROCEDURE — G0378 HOSPITAL OBSERVATION PER HR: HCPCS

## 2019-02-13 PROCEDURE — 25010000002 ENOXAPARIN PER 10 MG: Performed by: SPECIALIST

## 2019-02-13 PROCEDURE — 82962 GLUCOSE BLOOD TEST: CPT

## 2019-02-13 PROCEDURE — 99212 OFFICE O/P EST SF 10 MIN: CPT | Performed by: NURSE PRACTITIONER

## 2019-02-13 PROCEDURE — 85651 RBC SED RATE NONAUTOMATED: CPT | Performed by: INTERNAL MEDICINE

## 2019-02-13 RX ORDER — METHYLCELLULOSE 2 G/19G
2 POWDER, FOR SOLUTION ORAL DAILY
Qty: 60 G | Refills: 6 | Status: SHIPPED | OUTPATIENT
Start: 2019-02-13 | End: 2019-03-15

## 2019-02-13 RX ORDER — LACTULOSE 10 G/10G
10 SOLUTION ORAL 3 TIMES DAILY
Qty: 30 EACH | Refills: 6 | Status: ON HOLD | OUTPATIENT
Start: 2019-02-13 | End: 2019-03-04

## 2019-02-13 RX ADMIN — COSYNTROPIN 0.25 MG: 0.25 INJECTION, POWDER, LYOPHILIZED, FOR SOLUTION INTRAMUSCULAR; INTRAVENOUS at 08:48

## 2019-02-13 RX ADMIN — DULOXETINE HYDROCHLORIDE 60 MG: 30 CAPSULE, DELAYED RELEASE ORAL at 08:33

## 2019-02-13 RX ADMIN — MIDODRINE HYDROCHLORIDE 10 MG: 10 TABLET ORAL at 08:33

## 2019-02-13 RX ADMIN — ISOSORBIDE MONONITRATE 30 MG: 30 TABLET, EXTENDED RELEASE ORAL at 08:33

## 2019-02-13 RX ADMIN — SODIUM CHLORIDE, PRESERVATIVE FREE 3 ML: 5 INJECTION INTRAVENOUS at 09:09

## 2019-02-13 RX ADMIN — ASPIRIN 81 MG: 81 TABLET, DELAYED RELEASE ORAL at 08:33

## 2019-02-13 RX ADMIN — OXYCODONE HYDROCHLORIDE AND ACETAMINOPHEN 1 TABLET: 10; 325 TABLET ORAL at 15:35

## 2019-02-13 RX ADMIN — INSULIN LISPRO 16 UNITS: 100 INJECTION, SOLUTION INTRAVENOUS; SUBCUTANEOUS at 17:22

## 2019-02-13 RX ADMIN — INSULIN LISPRO 12 UNITS: 100 INJECTION, SOLUTION INTRAVENOUS; SUBCUTANEOUS at 12:58

## 2019-02-13 RX ADMIN — MIDODRINE HYDROCHLORIDE 10 MG: 10 TABLET ORAL at 17:19

## 2019-02-13 RX ADMIN — OXYCODONE HYDROCHLORIDE AND ACETAMINOPHEN 1 TABLET: 10; 325 TABLET ORAL at 08:33

## 2019-02-13 RX ADMIN — ENOXAPARIN SODIUM 30 MG: 30 INJECTION SUBCUTANEOUS at 09:07

## 2019-02-13 RX ADMIN — PANTOPRAZOLE SODIUM 40 MG: 40 TABLET, DELAYED RELEASE ORAL at 09:08

## 2019-02-13 RX ADMIN — POLYETHYLENE GLYCOL 3350 17 G: 17 POWDER, FOR SOLUTION ORAL at 08:33

## 2019-02-13 RX ADMIN — OXYCODONE HYDROCHLORIDE AND ACETAMINOPHEN 1 TABLET: 10; 325 TABLET ORAL at 05:30

## 2019-02-13 RX ADMIN — MIDODRINE HYDROCHLORIDE 10 MG: 10 TABLET ORAL at 12:59

## 2019-02-13 RX ADMIN — INSULIN LISPRO 8 UNITS: 100 INJECTION, SOLUTION INTRAVENOUS; SUBCUTANEOUS at 08:34

## 2019-02-13 RX ADMIN — ROSUVASTATIN CALCIUM 40 MG: 20 TABLET, FILM COATED ORAL at 09:08

## 2019-02-13 NOTE — PLAN OF CARE
Problem: Patient Care Overview  Goal: Plan of Care Review  Outcome: Ongoing (interventions implemented as appropriate)   02/13/19 0655   Coping/Psychosocial   Plan of Care Reviewed With patient   Plan of Care Review   Progress improving   OTHER   Outcome Summary No c/o N/V this shift. Slept well. Scheduled pain meds given for chronic neck pain. No other c/o pain. Afebrile. Home today.       Problem: Fall Risk (Adult)  Goal: Absence of Fall  Outcome: Ongoing (interventions implemented as appropriate)      Problem: Nausea/Vomiting (Adult)  Goal: Symptom Relief  Outcome: Ongoing (interventions implemented as appropriate)    Goal: Adequate Hydration  Outcome: Ongoing (interventions implemented as appropriate)      Problem: Constipation (Adult)  Goal: Effective Bowel Elimination  Outcome: Ongoing (interventions implemented as appropriate)    Goal: Comfort  Outcome: Ongoing (interventions implemented as appropriate)      Problem: Skin Injury Risk (Adult)  Goal: Skin Health and Integrity  Outcome: Ongoing (interventions implemented as appropriate)

## 2019-02-13 NOTE — PROGRESS NOTES
[facility] Medicine Services  INPATIENT PROGRESS NOTE    Patient Name: Erick Luong  Date of Admission: 2/7/2019  Today's Date: 02/13/19  Length of Stay: 0  Primary Care Physician: Del Shetty MD    Subjective   Chief Complaint: Consult for HTN/DM.   HPI   He states that he feels back to baseline today.  He does not complain of any abdominal pain.  He has tolerated diet without difficulty.  No nausea or vomiting.  He hopes to go home today.  He is waiting on Dr. Perry to come by.    Review of Systems   All pertinent negatives and positives are as above. All other systems have been reviewed and are negative unless otherwise stated.     Objective    Temp:  [97.9 °F (36.6 °C)-98.1 °F (36.7 °C)] 98.1 °F (36.7 °C)  Heart Rate:  [60-69] 69  Resp:  [16-20] 16  BP: (115-151)/(55-68) 151/68  Physical Exam  Constitutional: He is oriented to person, place, and time. He appears well-developed and well-nourished. Up in the chair. No distress.  Nontoxic.  No family currently present. Discussed with his nurse, Neena.   Head: Normocephalic and atraumatic.   Eyes: Conjunctivae and EOM are normal. Pupils are equal, round, and reactive to light.   Neck: Neck supple. No JVD present.   Musculoskeletal: Normal range of motion. He exhibits edema (trace). He exhibits no tenderness or deformity.   Neurological: He is alert and oriented to person, place, and time. He displays normal reflexes. No cranial nerve deficit. He exhibits normal muscle tone.   Skin: Skin is warm and dry. No rash noted.   Psychiatric: He has a normal mood and affect. His behavior is normal. Judgment and thought content normal.    Results Review:  I have reviewed the labs, radiology results, and diagnostic studies.    Laboratory Data:   Results from last 7 days   Lab Units 02/13/19  0639 02/10/19  0602 02/09/19  0630   WBC 10*3/mm3 6.36 7.06 7.08   HEMOGLOBIN g/dL 10.9* 10.9* 12.0*   HEMATOCRIT % 32.1* 32.6* 36.4*    PLATELETS 10*3/mm3 246 232 248        Results from last 7 days   Lab Units 02/13/19  0639 02/10/19  0602 02/09/19  0629 02/08/19  0631   SODIUM mmol/L 134* 136 136 136   POTASSIUM mmol/L 4.1 4.1 4.2 4.2   CHLORIDE mmol/L 102 100 97* 97*   CO2 mmol/L 27.0 28.0 30.0 29.0   BUN mg/dL 12 14 17 16   CREATININE mg/dL 1.13 1.37 1.62* 1.41*   CALCIUM mg/dL 8.6 8.6 9.0 8.7   BILIRUBIN mg/dL 0.5  --  1.1* 0.9   ALK PHOS U/L 54  --  60 59   ALT (SGPT) U/L 24  --  21 21   AST (SGOT) U/L 27  --  31 29   GLUCOSE mg/dL 197* 148* 205* 240*     I have reviewed the patient's current medications.     Assessment/Plan     Active Hospital Problems    Diagnosis   • **Lower abdominal pain   • Ischemic colitis (CMS/[facility])   • Abdominal pain   • Constipation   • Orthostatic hypotension   • Chronic kidney disease, stage III (moderate) (CMS/[facility])   • Obesity, unspecified obesity severity, unspecified obesity type   • HTN (hypertension), benign   • Type 2 diabetes mellitus with hyperglycemia, with long-term current use of insulin (CMS/[facility])     hold insulin the am of procedure     • CAD (coronary artery disease)   • Chronic diastolic congestive heart failure (CMS/[facility])     Plan:   The patient is admitted to Dr. Perry's service.  Internal medicine, gastroenterology, and infectious disease are also following.     He presented on 2/7 with complaints of lower abdominal pain.  He has problems with chronic constipation.  CT scan noted gas in the portal system of unknown etiology.  There was concern of possible translocation of bacteria.  He was started on broad-spectrum antibiotics. Blood cultures show no growth to date.    GI PCR panel was negative.  Infectious disease and GI have agreed with stopping his antibiotic coverage.  EGD and colonoscopy reports as per HPI.       It is quite possible that the patient suffered an episode of ischemic colitis with dropping of his blood pressure last week.  This could have caused the phenomenon seen on  CT.     We are primarily following for diabetes and essential hypertension/orthostatic hypotension. Dr. Lomas recently started him on midodrine.  His blood pressure has been largely acceptable with no supine hypertension. His blood sugars have also been largely acceptable with some improvement over the last 24 hours.     He is back on a regular diet.  Stopped IV fluids on 2/12.     Lovenox has been ordered for DVT prophylaxis by general surgery.     Discharge Planning: Per general surgery. Likely today.  If he is discharged, he can go back on his regular medications prior to admission.    Timbo Adame,    02/13/19   11:15 AM

## 2019-02-13 NOTE — DISCHARGE SUMMARY
Date of Discharge:  2/13/2019    Discharge Diagnosis: Colitis mid transverse  Presenting Problem/History of Present Illness      Erick Luong  is a 63 y.o. male presents with a significant history of having multiple medical problems which include arthritis chronic back pain congestive heart failure coronary artery disease diabetes obesity hypertension history of pancreatitis sleep disorder, he has chronic constipation he takes Linzess 290 mcg and and lactulose on a daily basis he also is on Percocet 10 and has very minimal bowel activity.  He has been followed by cardiology nephrology GI medicine recently he has diagnosis of having autonomic nervous system neuropathy with reduced blood pressure and is on  midrodrine 5 mg twice a day.  He is on this by Dr. Lomas .  His creatinine has improved to 2 his last colonoscopy was 2 years ago by report and unremarkable but he has chronic obstipation constipation.  With this problem he has been working with GI medicine to have bowel movements is also been working with Dr. kenney to increase his blood pressure is on certain medications for this and with his abdominal pain he was admitted a CT scan was obtained showing some gas in his portal system and also in his liver.  The etiology of this is unknown there is absolutely no finding on his CT scan there is no bowel wall thickening there is no sign of any inflammation the only physical finding that I appreciate he has normal bowel sounds he has no peritoneal signs but lower abdominal pain and he has a significant impaction in his colon.  He states that he does not go that frequently and with his Linzess also he needs lactulose to go and intermittently he needs magnesium citrate.  I have absolutely no reason are no obvious knowledge why he has this portal venous gas but postulating a possibility given no abnormalities on a CT scan other than this gas is quite possibly he has translocation of bacteria from this significant  constipation that he has on a regular basis.  He is impacted.  He also states that he is gained 20 pounds over the past week.  The only thing that I can appreciate and suggest would be to treat his medical problems hydrate him and give him a fluid bolus, try to stimulate bowel activity from above without magnesium citrate, and also below with a hog enema.  There is no stool that needs to be broken up but a large amount of stool in his rectum.  We will also repeat his CT scan in the morning to assure no other findings are appreciated and to see if the stool has moved also we will consult infectious disease, hospitalist bethany Lobo, his gastroenterologist for evaluation and treatment.     Past surgical history significant for laparoscopic cholecystectomy, open appendectomy, cardiac surgery, cataract extraction cervical spine surgery, colonoscopy, upper and lower endoscopies.     Medical problems are multiple and are listed.      Above is some of the example of the venous gas present     he was subsequently admitted with the above history he had portal venous air etiology of this was unknown, he was placed on Merrem and had improvement over the following day with expulsion of stool and antibiotic he was feeling much better his follow-up CT scan showed no further venous gas but he did have some edema of his transverse colon GI medicine, infectious disease and hospitalist myriam Lobo was consulted he was monitored in the hospital for the next 5 days he had full expulsion of the stool and the constipation that he was experiencing his abdominal pain resolved and with the notation of the edema he had upper and lower endoscopy showing minimal amount of colitis in the transverse colon but no ischemic colitis and no significant problem that was causing the portal venous air.  Etiology of this area that he had on his CT scan is unknown.  And with the above problem he was given 5 days of Merrem he had slow but steady improvement  currently he is hospital day #5 he is afebrile his white count is reduced to 6 his hematocrit is 32 elect lites within normal limits except for a BUN and creatinine of 12 and 1.13 glucose elevated at 197 his platelet count of 246.  He was seen and evaluated by hospitalist of [facility], infectious disease, and also gastroenterology and appreciate all of the consultants.  Currently he will be discharged on all of his previous medications without exception he will follow-up with me if questions or problems arise otherwise continue his follow-up with his regular physician.    Procedures Performed  Procedure(s):  ESOPHAGOGASTRODUODENOSCOPY WITH ANESTHESIA  COLONOSCOPY WITH ANESTHESIA  02/11 1222 UPPER GI ENDOSCOPY  02/11 1222 COLONOSCOPY    Consults:   Consults     Date and Time Order Name Status Description    2/7/2019 2104 Inpatient Infectious Diseases Consult Completed     2/7/2019 2104 Inpatient Hospitalist Consult Completed     2/7/2019 2057 Inpatient Gastroenterology Consult Completed     2/7/2019 2057 Inpatient Infectious Diseases Consult      2/7/2019 2057 Inpatient Hospitalist Consult Completed     2/7/2019 2013 Surgery (on-call MD unless specified) Completed           Pertinent Test Results:   Lab Results (last 24 hours)     Procedure Component Value Units Date/Time    POC Glucose Once [[id number]]  (Abnormal) Collected:  02/13/19 1155    Specimen:  Blood Updated:  02/13/19 1215     Glucose 263 mg/dL      Comment: : [id number] Asim Finley AnnMeter ID: [id number]       Cortisol [[id number]] Collected:  02/13/19 0920    Specimen:  Blood Updated:  02/13/19 0929    POC Glucose Once [[id number]]  (Abnormal) Collected:  02/13/19 0815    Specimen:  Blood Updated:  02/13/19 0827     Glucose 212 mg/dL      Comment: : [id number] Cynthia MelanieMeter ID: [id number]       CBC & Differential [[id number]] Collected:  02/13/19 0639    Specimen:  Blood Updated:  02/13/19 0801    Narrative:       The following orders were created  for panel order CBC & Differential.  Procedure                               Abnormality         Status                     ---------                               -----------         ------                     Manual Differential[[id number]]          Abnormal            Final result               CBC Auto Differential[[id number]]        Abnormal            Final result                 Please view results for these tests on the individual orders.    Manual Differential [[id number]]  (Abnormal) Collected:  02/13/19 0639    Specimen:  Blood Updated:  02/13/19 0801     Neutrophil % 61.0 %      Lymphocyte % 20.0 %      Monocyte % 7.0 %      Eosinophil % 7.0 %      Basophil % 1.0 %      Metamyelocyte % 1.0 %      Atypical Lymphocyte % 3.0 %      Neutrophils Absolute 3.88 10*3/mm3      Lymphocytes Absolute 1.27 10*3/mm3      Monocytes Absolute 0.45 10*3/mm3      Eosinophils Absolute 0.45 10*3/mm3      Basophils Absolute 0.06 10*3/mm3      Polychromasia Slight/1+     WBC Morphology Normal     Giant Platelets Slight/1+    CBC Auto Differential [[id number]]  (Abnormal) Collected:  02/13/19 0639    Specimen:  Blood Updated:  02/13/19 0800     WBC 6.36 10*3/mm3      RBC 3.60 10*6/mm3      Hemoglobin 10.9 g/dL      Hematocrit 32.1 %      MCV 89.2 fL      MCH 30.3 pg      MCHC 34.0 g/dL      RDW 14.0 %      RDW-SD 45.6 fl      MPV 11.0 fL      Platelets 246 10*3/mm3     Sedimentation Rate [[id number]]  (Abnormal) Collected:  02/13/19 0639    Specimen:  Blood Updated:  02/13/19 0730     Sed Rate 18 mm/hr     Comprehensive Metabolic Panel [[id number]]  (Abnormal) Collected:  02/13/19 0639    Specimen:  Blood Updated:  02/13/19 0721     Glucose 197 mg/dL      BUN 12 mg/dL      Creatinine 1.13 mg/dL      Sodium 134 mmol/L      Potassium 4.1 mmol/L      Chloride 102 mmol/L      CO2 27.0 mmol/L      Calcium 8.6 mg/dL      Total Protein 5.9 g/dL      Albumin 3.40 g/dL      ALT (SGPT) 24 U/L      AST (SGOT) 27 U/L      Alkaline Phosphatase 54  U/L      Total Bilirubin 0.5 mg/dL      eGFR Non African Amer 66 mL/min/1.73      Globulin 2.5 gm/dL      A/G Ratio 1.4 g/dL      BUN/Creatinine Ratio 10.6     Anion Gap 5.0 mmol/L     Cortisol [[id number]] Collected:  02/13/19 0639    Specimen:  Blood Updated:  02/13/19 0700    POC Glucose Once [[id number]]  (Abnormal) Collected:  02/12/19 2050    Specimen:  Blood Updated:  02/12/19 2134     Glucose 301 mg/dL      Comment: : [id number] Alonzo KristaMeter ID: [id number]       Blood Culture - Blood, Arm, Right [[id number]] Collected:  02/07/19 1800    Specimen:  Blood from Arm, Right Updated:  02/12/19 1830     Blood Culture No growth at 5 days    Blood Culture - Blood, Arm, Left [[id number]] Collected:  02/07/19 1742    Specimen:  Blood from Arm, Left Updated:  02/12/19 1800     Blood Culture No growth at 5 days    POC Glucose Once [[id number]]  (Abnormal) Collected:  02/12/19 1632    Specimen:  Blood Updated:  02/12/19 1642     Glucose 256 mg/dL      Comment: : [id number] Williams AmandaMeter ID: [id number]               Condition on Discharge: Good condition    Discharge Disposition discharged to home    Discharge Medications     Discharge Medications      New Medications      Instructions Start Date   CITRUCEL oral powder  Generic drug:  methylcellulose   2 g, Oral, Daily      lactulose 10 g packet  Commonly known as:  CEPHULAC   10 g, Oral, 3 Times Daily         Continue These Medications      Instructions Start Date   allopurinol 100 MG tablet  Commonly known as:  ZYLOPRIM   100 mg, Oral, Daily      amitriptyline 50 MG tablet  Commonly known as:  ELAVIL   50 mg, Oral, Nightly      aspirin 81 MG EC tablet   81 mg, Oral, Daily      bumetanide 2 MG tablet  Commonly known as:  BUMEX   2 mg, Oral, Daily      colchicine 0.6 MG tablet   0.6 mg, Oral, 2 Times Daily PRN      CRESTOR 40 MG tablet  Generic drug:  rosuvastatin   40 mg, Oral, Daily      DULoxetine 60 MG capsule  Commonly known as:  CYMBALTA   60 mg, Oral,  Daily      escitalopram 10 MG tablet  Commonly known as:  LEXAPRO   10 mg, Oral, Daily      insulin regular 500 UNIT/ML CONCENTRATED injection  Commonly known as:  humuLIN R   100 Units, Subcutaneous, 3 Times Daily Before Meals, Packaging states 100 units with regular meals; 120 units with large meals or 360 units daily      isosorbide mononitrate 30 MG 24 hr tablet  Commonly known as:  IMDUR   30 mg, Oral, Daily      linaclotide 290 MCG capsule capsule  Commonly known as:  LINZESS   290 mcg, Oral, Every Morning Before Breakfast      metOLazone 2.5 MG tablet  Commonly known as:  ZAROXOLYN   2.5 mg, Oral, Daily      midodrine 5 MG tablet  Commonly known as:  PROAMATINE   10 mg, Oral, 3 Times Daily      nebivolol 5 MG tablet  Commonly known as:  BYSTOLIC   5 mg, Oral, Daily      ondansetron ODT 4 MG disintegrating tablet  Commonly known as:  ZOFRAN-ODT   4 mg, Oral, 4 Times Daily PRN      oxyCODONE-acetaminophen  MG per tablet  Commonly known as:  PERCOCET   1 tablet, Oral, Every 6 Hours PRN      pantoprazole 40 MG EC tablet  Commonly known as:  PROTONIX   40 mg, Oral, Daily             Discharge Diet: Regular diet  Diet Instructions     Advance Diet as Tolerated            Activity at Discharge:   Activity Instructions     Activity as Tolerated      Discharge Activity      1) continue all current medications, continue to soften the stool, and to influence of bowel movement at least every other day.  Continue fiber continue lactulose for bowel activity          Follow-up Appointments  Future Appointments   Date Time Provider Department Center   3/12/2019  1:00 PM [facility]     Additional Instructions for the Follow-ups that You Need to Schedule     Discharge Follow-up with Specified Provider: Follow-up with Dr. Perry in 3 weeks.   As directed      To:  Follow-up with Dr. Perry in 3 weeks.               Test Results Pending at Discharge   Order Current Status    Cortisol In  process    Cortisol In process    Tissue Pathology Exam In process           Ken Perry MD  02/13/19  2:52 PM    Time: Time spent at discharge 30 minutes     EMR Dragon/Transcription disclaimer: Much of this encounter note is an electronic transcription/translation of spoken language to printed text. The electronic translation of spoken language may permit erroneous, or at times, nonsensical words or phrases to be inadvertently transcribed; although I have reviewed the note for such errors, some may still exist.

## 2019-02-14 ENCOUNTER — READMISSION MANAGEMENT (OUTPATIENT)
Dept: CALL CENTER | Facility: HOSPITAL | Age: 64
End: 2019-02-14

## 2019-02-14 LAB
CORTIS SERPL-MCNC: 18.8 UG/DL
CORTIS SERPL-MCNC: 7.1 UG/DL
CYTO UR: NORMAL
LAB AP CASE REPORT: NORMAL
PATH REPORT.FINAL DX SPEC: NORMAL
PATH REPORT.GROSS SPEC: NORMAL

## 2019-02-14 NOTE — OUTREACH NOTE
Prep Survey      Responses   Facility patient discharged from?  [facility] [place]   Is patient eligible?  Yes   Discharge diagnosis  colitis mid transverse   Does the patient have one of the following disease processes/diagnoses(primary or secondary)?  Other   Does the patient have Home health ordered?  No   Is there a DME ordered?  No   Comments regarding appointments  see AVS   Prep survey completed?  Yes          Shaniqua Mccord RN

## 2019-02-15 ENCOUNTER — READMISSION MANAGEMENT (OUTPATIENT)
Dept: CALL CENTER | Facility: HOSPITAL | Age: 64
End: 2019-02-15

## 2019-02-15 NOTE — OUTREACH NOTE
Medical Week 1 Survey      Responses   Facility patient discharged from?  [facility]   Does the patient have one of the following disease processes/diagnoses(primary or secondary)?  Other   Is there a successful TCM telephone encounter documented?  No   Week 1 attempt successful?  Yes   Call start time  1559   Call end time  1600   Discharge diagnosis  colitis mid transverse   Meds reviewed with patient/caregiver?  Yes   Is the patient having any side effects they believe may be caused by any medication additions or changes?  No   Does the patient have all medications ordered at discharge?  Yes   Is the patient taking all medications as directed (includes completed medication regime)?  Yes   Does the patient have a primary care provider?   Yes   Does the patient have an appointment with their PCP within 7 days of discharge?  Yes   Has the patient kept scheduled appointments due by today?  N/A   Has home health visited the patient within 72 hours of discharge?  N/A   Psychosocial issues?  No   Did the patient receive a copy of their discharge instructions?  Yes   Nursing interventions  Educated on MyChart   What is the patient's perception of their health status since discharge?  Improving   Is the patient/caregiver able to teach back signs and symptoms related to disease process for when to call PCP?  Yes   Is the patient/caregiver able to teach back signs and symptoms related to disease process for when to call 911?  Yes   Is the patient/caregiver able to teach back the hierarchy of who to call/visit for symptoms/problems? PCP, Specialist, Home health nurse, Urgent Care, ED, 911  Yes   Week 1 call completed?  Yes   Graduated  Yes   Did the patient feel the follow up calls were helpful during their recovery period?  Yes   Was the number of calls appropriate?  Yes          Tricia Ferreira RN

## 2019-03-03 ENCOUNTER — APPOINTMENT (OUTPATIENT)
Dept: CT IMAGING | Facility: HOSPITAL | Age: 64
End: 2019-03-03

## 2019-03-03 ENCOUNTER — HOSPITAL ENCOUNTER (INPATIENT)
Facility: HOSPITAL | Age: 64
LOS: 3 days | Discharge: HOME | End: 2019-03-06
Attending: EMERGENCY MEDICINE | Admitting: FAMILY MEDICINE

## 2019-03-03 DIAGNOSIS — K55.9 ISCHEMIC COLITIS (HCC): ICD-10-CM

## 2019-03-03 DIAGNOSIS — G47.30 SLEEP APNEA, UNSPECIFIED TYPE: ICD-10-CM

## 2019-03-03 DIAGNOSIS — R10.13 EPIGASTRIC PAIN: ICD-10-CM

## 2019-03-03 DIAGNOSIS — R50.9 FEVER, UNKNOWN ORIGIN: ICD-10-CM

## 2019-03-03 DIAGNOSIS — R10.9 ABDOMINAL PAIN, UNSPECIFIED ABDOMINAL LOCATION: ICD-10-CM

## 2019-03-03 DIAGNOSIS — R07.89 CHEST PAIN, NON-CARDIAC: ICD-10-CM

## 2019-03-03 DIAGNOSIS — K59.01 SLOW TRANSIT CONSTIPATION: ICD-10-CM

## 2019-03-03 DIAGNOSIS — I10 HTN (HYPERTENSION), BENIGN: ICD-10-CM

## 2019-03-03 DIAGNOSIS — K59.00 CONSTIPATION, UNSPECIFIED CONSTIPATION TYPE: ICD-10-CM

## 2019-03-03 DIAGNOSIS — I50.32 CHRONIC DIASTOLIC CONGESTIVE HEART FAILURE (HCC): Chronic | ICD-10-CM

## 2019-03-03 DIAGNOSIS — R41.82 ALTERED MENTAL STATUS, UNSPECIFIED ALTERED MENTAL STATUS TYPE: Primary | ICD-10-CM

## 2019-03-03 DIAGNOSIS — Z79.4 TYPE 2 DIABETES MELLITUS WITH HYPERGLYCEMIA, WITH LONG-TERM CURRENT USE OF INSULIN (HCC): ICD-10-CM

## 2019-03-03 DIAGNOSIS — K85.00 IDIOPATHIC ACUTE PANCREATITIS WITHOUT INFECTION OR NECROSIS: ICD-10-CM

## 2019-03-03 DIAGNOSIS — N18.30 CHRONIC KIDNEY DISEASE, STAGE III (MODERATE) (HCC): ICD-10-CM

## 2019-03-03 DIAGNOSIS — Z79.4 OTHER SPECIFIED DIABETES MELLITUS WITH COMPLICATION, WITH LONG-TERM CURRENT USE OF INSULIN: ICD-10-CM

## 2019-03-03 DIAGNOSIS — R11.2 NAUSEA AND VOMITING, INTRACTABILITY OF VOMITING NOT SPECIFIED, UNSPECIFIED VOMITING TYPE: ICD-10-CM

## 2019-03-03 DIAGNOSIS — I10 ESSENTIAL HYPERTENSION: ICD-10-CM

## 2019-03-03 DIAGNOSIS — A08.4 VIRAL GASTROENTERITIS: ICD-10-CM

## 2019-03-03 DIAGNOSIS — R06.02 SHORTNESS OF BREATH: ICD-10-CM

## 2019-03-03 DIAGNOSIS — R73.9 HYPERGLYCEMIA: ICD-10-CM

## 2019-03-03 DIAGNOSIS — E66.9 OBESITY, UNSPECIFIED OBESITY SEVERITY, UNSPECIFIED OBESITY TYPE: ICD-10-CM

## 2019-03-03 DIAGNOSIS — E11.65 TYPE 2 DIABETES MELLITUS WITH HYPERGLYCEMIA, WITH LONG-TERM CURRENT USE OF INSULIN (HCC): ICD-10-CM

## 2019-03-03 DIAGNOSIS — M19.90 ARTHRITIS: ICD-10-CM

## 2019-03-03 DIAGNOSIS — R10.30 LOWER ABDOMINAL PAIN: ICD-10-CM

## 2019-03-03 DIAGNOSIS — Z74.09 IMPAIRED FUNCTIONAL MOBILITY, BALANCE, AND ENDURANCE: ICD-10-CM

## 2019-03-03 DIAGNOSIS — I95.1 ORTHOSTATIC HYPOTENSION: ICD-10-CM

## 2019-03-03 DIAGNOSIS — E78.2 MIXED HYPERLIPIDEMIA: ICD-10-CM

## 2019-03-03 DIAGNOSIS — E13.8 OTHER SPECIFIED DIABETES MELLITUS WITH COMPLICATION, WITH LONG-TERM CURRENT USE OF INSULIN: ICD-10-CM

## 2019-03-03 DIAGNOSIS — R07.81 PLEURITIC CHEST PAIN: ICD-10-CM

## 2019-03-03 DIAGNOSIS — I25.10 CORONARY ARTERY DISEASE INVOLVING NATIVE CORONARY ARTERY OF NATIVE HEART WITHOUT ANGINA PECTORIS: ICD-10-CM

## 2019-03-03 DIAGNOSIS — Z78.9 NONSMOKER: ICD-10-CM

## 2019-03-03 DIAGNOSIS — R65.10 SIRS (SYSTEMIC INFLAMMATORY RESPONSE SYNDROME) (HCC): ICD-10-CM

## 2019-03-03 DIAGNOSIS — R41.0 CONFUSION: ICD-10-CM

## 2019-03-03 LAB
ALBUMIN SERPL-MCNC: 4.5 G/DL (ref 3.5–5)
ALBUMIN/GLOB SERPL: 2 G/DL (ref 1.1–2.5)
ALP SERPL-CCNC: 66 U/L (ref 24–120)
ALT SERPL W P-5'-P-CCNC: 20 U/L (ref 0–54)
AMMONIA BLD-SCNC: <9 UMOL/L (ref 9–33)
AMPHET+METHAMPHET UR QL: NEGATIVE
ANION GAP SERPL CALCULATED.3IONS-SCNC: 9 MMOL/L (ref 4–13)
APTT PPP: 26.3 SECONDS (ref 24.1–34.8)
ARTERIAL PATENCY WRIST A: POSITIVE
AST SERPL-CCNC: 24 U/L (ref 7–45)
ATMOSPHERIC PRESS: 754 MMHG
BARBITURATES UR QL SCN: NEGATIVE
BASE EXCESS BLDA CALC-SCNC: 1.1 MMOL/L (ref 0–2)
BASOPHILS # BLD AUTO: 0.05 10*3/MM3 (ref 0–0.2)
BASOPHILS NFR BLD AUTO: 0.5 % (ref 0–2)
BDY SITE: NORMAL
BENZODIAZ UR QL SCN: NEGATIVE
BILIRUB SERPL-MCNC: 0.6 MG/DL (ref 0.1–1)
BODY TEMPERATURE: 37 C
BUN BLD-MCNC: 19 MG/DL (ref 5–21)
BUN/CREAT SERPL: 17.8 (ref 7–25)
CALCIUM SPEC-SCNC: 9.8 MG/DL (ref 8.4–10.4)
CANNABINOIDS SERPL QL: NEGATIVE
CHLORIDE SERPL-SCNC: 96 MMOL/L (ref 98–110)
CO2 SERPL-SCNC: 26 MMOL/L (ref 24–31)
COCAINE UR QL: NEGATIVE
CREAT BLD-MCNC: 1.07 MG/DL (ref 0.5–1.4)
D-LACTATE SERPL-SCNC: 1.7 MMOL/L (ref 0.5–2)
DEPRECATED RDW RBC AUTO: 41.9 FL (ref 40–54)
EOSINOPHIL # BLD AUTO: 0.42 10*3/MM3 (ref 0–0.7)
EOSINOPHIL NFR BLD AUTO: 4.3 % (ref 0–4)
ERYTHROCYTE [DISTWIDTH] IN BLOOD BY AUTOMATED COUNT: 13.4 % (ref 12–15)
GFR SERPL CREATININE-BSD FRML MDRD: 70 ML/MIN/1.73
GLOBULIN UR ELPH-MCNC: 2.3 GM/DL
GLUCOSE BLD-MCNC: 496 MG/DL (ref 70–100)
GLUCOSE BLDC GLUCOMTR-MCNC: 361 MG/DL (ref 70–130)
GLUCOSE BLDC GLUCOMTR-MCNC: 365 MG/DL (ref 70–130)
HCO3 BLDA-SCNC: 25.4 MMOL/L (ref 20–26)
HCT VFR BLD AUTO: 33.9 % (ref 40–52)
HGB BLD-MCNC: 11.8 G/DL (ref 14–18)
HOLD SPECIMEN: NORMAL
IMM GRANULOCYTES # BLD AUTO: 0.03 10*3/MM3 (ref 0–0.05)
IMM GRANULOCYTES NFR BLD AUTO: 0.3 % (ref 0–5)
INR PPP: 0.96 (ref 0.91–1.09)
LYMPHOCYTES # BLD AUTO: 1.54 10*3/MM3 (ref 0.72–4.86)
LYMPHOCYTES NFR BLD AUTO: 15.8 % (ref 15–45)
Lab: NORMAL
MCH RBC QN AUTO: 29.8 PG (ref 28–32)
MCHC RBC AUTO-ENTMCNC: 34.8 G/DL (ref 33–36)
MCV RBC AUTO: 85.6 FL (ref 82–95)
METHADONE UR QL SCN: NEGATIVE
MODALITY: NORMAL
MONOCYTES # BLD AUTO: 0.83 10*3/MM3 (ref 0.19–1.3)
MONOCYTES NFR BLD AUTO: 8.5 % (ref 4–12)
NEUTROPHILS # BLD AUTO: 6.87 10*3/MM3 (ref 1.87–8.4)
NEUTROPHILS NFR BLD AUTO: 70.6 % (ref 39–78)
NRBC BLD AUTO-RTO: 0 /100 WBC (ref 0–0)
OPIATES UR QL: NEGATIVE
PCO2 BLDA: 38.6 MM HG (ref 35–45)
PCP UR QL SCN: NEGATIVE
PH BLDA: 7.43 PH UNITS (ref 7.35–7.45)
PLATELET # BLD AUTO: 248 10*3/MM3 (ref 130–400)
PMV BLD AUTO: 11.4 FL (ref 6–12)
PO2 BLDA: 84 MM HG (ref 83–108)
POTASSIUM BLD-SCNC: 4.8 MMOL/L (ref 3.5–5.3)
PROT SERPL-MCNC: 6.8 G/DL (ref 6.3–8.7)
PROTHROMBIN TIME: 13.1 SECONDS (ref 11.9–14.6)
RBC # BLD AUTO: 3.96 10*6/MM3 (ref 4.8–5.9)
SAO2 % BLDCOA: 97.5 % (ref 94–99)
SODIUM BLD-SCNC: 131 MMOL/L (ref 135–145)
VENTILATOR MODE: NORMAL
WBC NRBC COR # BLD: 9.74 10*3/MM3 (ref 4.8–10.8)

## 2019-03-03 PROCEDURE — 83605 ASSAY OF LACTIC ACID: CPT | Performed by: EMERGENCY MEDICINE

## 2019-03-03 PROCEDURE — 85730 THROMBOPLASTIN TIME PARTIAL: CPT | Performed by: EMERGENCY MEDICINE

## 2019-03-03 PROCEDURE — 36600 WITHDRAWAL OF ARTERIAL BLOOD: CPT

## 2019-03-03 PROCEDURE — 25010000002 ONDANSETRON PER 1 MG: Performed by: FAMILY MEDICINE

## 2019-03-03 PROCEDURE — 85025 COMPLETE CBC W/AUTO DIFF WBC: CPT | Performed by: EMERGENCY MEDICINE

## 2019-03-03 PROCEDURE — 85610 PROTHROMBIN TIME: CPT | Performed by: EMERGENCY MEDICINE

## 2019-03-03 PROCEDURE — 82962 GLUCOSE BLOOD TEST: CPT

## 2019-03-03 PROCEDURE — 80307 DRUG TEST PRSMV CHEM ANLYZR: CPT | Performed by: EMERGENCY MEDICINE

## 2019-03-03 PROCEDURE — 94799 UNLISTED PULMONARY SVC/PX: CPT

## 2019-03-03 PROCEDURE — 82140 ASSAY OF AMMONIA: CPT | Performed by: EMERGENCY MEDICINE

## 2019-03-03 PROCEDURE — G0378 HOSPITAL OBSERVATION PER HR: HCPCS

## 2019-03-03 PROCEDURE — 80053 COMPREHEN METABOLIC PANEL: CPT | Performed by: EMERGENCY MEDICINE

## 2019-03-03 PROCEDURE — 70450 CT HEAD/BRAIN W/O DYE: CPT

## 2019-03-03 PROCEDURE — 63710000001 INSULIN REGULAR HUMAN PER 5 UNITS: Performed by: EMERGENCY MEDICINE

## 2019-03-03 PROCEDURE — 99285 EMERGENCY DEPT VISIT HI MDM: CPT

## 2019-03-03 PROCEDURE — 82803 BLOOD GASES ANY COMBINATION: CPT

## 2019-03-03 PROCEDURE — 63710000001 INSULIN LISPRO (HUMAN) PER 5 UNITS: Performed by: FAMILY MEDICINE

## 2019-03-03 RX ORDER — NEBIVOLOL 5 MG/1
5 TABLET ORAL DAILY
Status: DISCONTINUED | OUTPATIENT
Start: 2019-03-03 | End: 2019-03-04

## 2019-03-03 RX ORDER — DULOXETIN HYDROCHLORIDE 30 MG/1
60 CAPSULE, DELAYED RELEASE ORAL DAILY
Status: DISCONTINUED | OUTPATIENT
Start: 2019-03-04 | End: 2019-03-06 | Stop reason: HOSPADM

## 2019-03-03 RX ORDER — ASPIRIN 81 MG/1
81 TABLET ORAL DAILY
Status: DISCONTINUED | OUTPATIENT
Start: 2019-03-04 | End: 2019-03-06 | Stop reason: HOSPADM

## 2019-03-03 RX ORDER — BUMETANIDE 2 MG/1
2 TABLET ORAL DAILY
Status: DISCONTINUED | OUTPATIENT
Start: 2019-03-04 | End: 2019-03-04

## 2019-03-03 RX ORDER — NICOTINE POLACRILEX 4 MG
15 LOZENGE BUCCAL
Status: DISCONTINUED | OUTPATIENT
Start: 2019-03-03 | End: 2019-03-06 | Stop reason: HOSPADM

## 2019-03-03 RX ORDER — ACETAMINOPHEN 325 MG/1
650 TABLET ORAL EVERY 4 HOURS PRN
Status: DISCONTINUED | OUTPATIENT
Start: 2019-03-03 | End: 2019-03-06 | Stop reason: HOSPADM

## 2019-03-03 RX ORDER — ESCITALOPRAM OXALATE 10 MG/1
10 TABLET ORAL DAILY
Status: DISCONTINUED | OUTPATIENT
Start: 2019-03-04 | End: 2019-03-04

## 2019-03-03 RX ORDER — LACTULOSE 20 G/30ML
10 SOLUTION ORAL 3 TIMES DAILY
Status: DISCONTINUED | OUTPATIENT
Start: 2019-03-03 | End: 2019-03-06 | Stop reason: HOSPADM

## 2019-03-03 RX ORDER — COLCHICINE 0.6 MG/1
0.6 TABLET ORAL 2 TIMES DAILY PRN
Status: DISCONTINUED | OUTPATIENT
Start: 2019-03-03 | End: 2019-03-06 | Stop reason: HOSPADM

## 2019-03-03 RX ORDER — SODIUM CHLORIDE 0.9 % (FLUSH) 0.9 %
3 SYRINGE (ML) INJECTION EVERY 12 HOURS SCHEDULED
Status: DISCONTINUED | OUTPATIENT
Start: 2019-03-03 | End: 2019-03-06 | Stop reason: HOSPADM

## 2019-03-03 RX ORDER — ONDANSETRON 4 MG/1
4 TABLET, ORALLY DISINTEGRATING ORAL 4 TIMES DAILY PRN
Status: DISCONTINUED | OUTPATIENT
Start: 2019-03-03 | End: 2019-03-06 | Stop reason: HOSPADM

## 2019-03-03 RX ORDER — OXYCODONE AND ACETAMINOPHEN 10; 325 MG/1; MG/1
1 TABLET ORAL EVERY 6 HOURS PRN
Status: DISCONTINUED | OUTPATIENT
Start: 2019-03-03 | End: 2019-03-04

## 2019-03-03 RX ORDER — PANTOPRAZOLE SODIUM 40 MG/1
40 TABLET, DELAYED RELEASE ORAL DAILY
Status: DISCONTINUED | OUTPATIENT
Start: 2019-03-03 | End: 2019-03-06 | Stop reason: HOSPADM

## 2019-03-03 RX ORDER — METOLAZONE 2.5 MG/1
2.5 TABLET ORAL DAILY
Status: DISCONTINUED | OUTPATIENT
Start: 2019-03-04 | End: 2019-03-04

## 2019-03-03 RX ORDER — SODIUM CHLORIDE 0.9 % (FLUSH) 0.9 %
3-10 SYRINGE (ML) INJECTION AS NEEDED
Status: DISCONTINUED | OUTPATIENT
Start: 2019-03-03 | End: 2019-03-06 | Stop reason: HOSPADM

## 2019-03-03 RX ORDER — MIDODRINE HYDROCHLORIDE 10 MG/1
10 TABLET ORAL 3 TIMES DAILY
Status: DISCONTINUED | OUTPATIENT
Start: 2019-03-03 | End: 2019-03-06 | Stop reason: HOSPADM

## 2019-03-03 RX ORDER — ISOSORBIDE MONONITRATE 30 MG/1
30 TABLET, EXTENDED RELEASE ORAL DAILY
Status: DISCONTINUED | OUTPATIENT
Start: 2019-03-04 | End: 2019-03-04

## 2019-03-03 RX ORDER — ROSUVASTATIN CALCIUM 20 MG/1
40 TABLET, COATED ORAL DAILY
Status: DISCONTINUED | OUTPATIENT
Start: 2019-03-04 | End: 2019-03-06 | Stop reason: HOSPADM

## 2019-03-03 RX ORDER — DEXTROSE MONOHYDRATE 25 G/50ML
25 INJECTION, SOLUTION INTRAVENOUS
Status: DISCONTINUED | OUTPATIENT
Start: 2019-03-03 | End: 2019-03-06 | Stop reason: HOSPADM

## 2019-03-03 RX ORDER — ONDANSETRON 2 MG/ML
4 INJECTION INTRAMUSCULAR; INTRAVENOUS EVERY 6 HOURS PRN
Status: DISCONTINUED | OUTPATIENT
Start: 2019-03-03 | End: 2019-03-06 | Stop reason: HOSPADM

## 2019-03-03 RX ORDER — ALLOPURINOL 100 MG/1
100 TABLET ORAL DAILY
Status: DISCONTINUED | OUTPATIENT
Start: 2019-03-04 | End: 2019-03-04

## 2019-03-03 RX ORDER — AMITRIPTYLINE HYDROCHLORIDE 25 MG/1
50 TABLET, FILM COATED ORAL NIGHTLY
Status: DISCONTINUED | OUTPATIENT
Start: 2019-03-03 | End: 2019-03-04

## 2019-03-03 RX ADMIN — METHYLCELLULOSE 500 MG: 500 TABLET ORAL at 21:16

## 2019-03-03 RX ADMIN — LACTULOSE 10 G: 20 SOLUTION ORAL at 21:16

## 2019-03-03 RX ADMIN — SODIUM CHLORIDE, PRESERVATIVE FREE 3 ML: 5 INJECTION INTRAVENOUS at 20:26

## 2019-03-03 RX ADMIN — MIDODRINE HYDROCHLORIDE 10 MG: 10 TABLET ORAL at 21:16

## 2019-03-03 RX ADMIN — ONDANSETRON HYDROCHLORIDE 4 MG: 2 INJECTION, SOLUTION INTRAMUSCULAR; INTRAVENOUS at 20:23

## 2019-03-03 RX ADMIN — INSULIN HUMAN 6 UNITS: 100 INJECTION, SOLUTION PARENTERAL at 18:23

## 2019-03-03 RX ADMIN — PANTOPRAZOLE SODIUM 40 MG: 40 TABLET, DELAYED RELEASE ORAL at 20:23

## 2019-03-03 RX ADMIN — INSULIN LISPRO 20 UNITS: 100 INJECTION, SOLUTION INTRAVENOUS; SUBCUTANEOUS at 21:16

## 2019-03-03 RX ADMIN — AMITRIPTYLINE HYDROCHLORIDE 50 MG: 25 TABLET, FILM COATED ORAL at 21:16

## 2019-03-03 RX ADMIN — OXYCODONE HYDROCHLORIDE AND ACETAMINOPHEN 1 TABLET: 10; 325 TABLET ORAL at 20:23

## 2019-03-03 NOTE — ED PROVIDER NOTES
Subjective   Patient with multiple medical problems came to the ED with confusion recently the patient was admitted to the hospital with air in his portal system no diagnostic reason was assessed for this the patient was admitted to the hospital repeat CT revealed the air to be resolved and the patient discharged home now the family is over here with altered level of consciousness patient is awake and alert somewhat confused no fever no vomiting        Altered Mental Status   Presenting symptoms: behavior changes and disorientation    Severity:  Mild  Most recent episode:  Yesterday  Episode history:  Single  Timing:  Constant  Progression:  Unchanged  Chronicity:  New  Context: not alcohol use, not dementia, not drug use, not head injury, not homeless, not recent change in medication, not recent illness and not recent infection    Associated symptoms: abdominal pain, nausea and weakness    Associated symptoms: normal movement, no agitation, no bladder incontinence, no difficulty breathing, no eye deviation, no fever, no hallucinations, no headaches, no light-headedness, no rash, no seizures, no slurred speech, no suicidal behavior, no visual change and no vomiting    Abdominal Pain   Associated symptoms: nausea    Associated symptoms: no chest pain, no chills, no cough, no fatigue, no fever and no vomiting        Review of Systems   Constitutional: Negative for chills, fatigue and fever.   HENT: Negative.  Negative for congestion.    Respiratory: Negative.  Negative for cough, chest tightness and stridor.    Cardiovascular: Negative.  Negative for chest pain.   Gastrointestinal: Positive for abdominal pain and nausea. Negative for abdominal distention and vomiting.   Endocrine: Negative.    Genitourinary: Negative.  Negative for bladder incontinence, difficulty urinating and flank pain.   Musculoskeletal: Negative.    Skin: Negative.  Negative for color change and rash.   Neurological: Positive for weakness.  "Negative for dizziness, seizures, light-headedness and headaches.   Psychiatric/Behavioral: Negative for agitation and hallucinations.   All other systems reviewed and are negative.      Past Medical History:   Diagnosis Date   • Arthritis    • CHF (congestive heart failure) (CMS/HCC)    • Coronary artery disease    • Diabetes mellitus (CMS/HCC)    • Hyperlipidemia    • Hypertension    • Myocardial infarction (CMS/HCC)    • Pancreatitis    • Renal disorder    • Sleep apnea with use of continuous positive airway pressure (CPAP)        Allergies   Allergen Reactions   • Bactrim [Sulfamethoxazole-Trimethoprim] Other (See Comments)     \"RENAL FAILURE\"       Past Surgical History:   Procedure Laterality Date   • ABDOMINAL SURGERY     • APPENDECTOMY     • BACK SURGERY     • CARDIAC SURGERY     • CATARACT EXTRACTION     • CERVICAL SPINE SURGERY     • COLONOSCOPY N/A 1/31/2017    Normal exam repeat in 5 years   • COLONOSCOPY N/A 2/11/2019    Procedure: COLONOSCOPY WITH ANESTHESIA;  Surgeon: Tyrell Smith MD;  Location: [facility] ENDOSCOPY;  Service: Gastroenterology   • COLONOSCOPY W/ POLYPECTOMY  03/04/2014    Hyperplastic polyp   • CORONARY ARTERY BYPASS GRAFT  10/2015   • ENDOSCOPY  04/13/2011    Gastritis with hemorrhage   • ENDOSCOPY N/A 5/5/2017    Normal exam   • ENDOSCOPY N/A 2/11/2019    Procedure: ESOPHAGOGASTRODUODENOSCOPY WITH ANESTHESIA;  Surgeon: Tyrell Smith MD;  Location: [facility] ENDOSCOPY;  Service: Gastroenterology   • INCISION AND DRAINAGE OF WOUND Left 09/2007    spider bite       Family History   Problem Relation Age of Onset   • Colon cancer Father    • Heart disease Father    • Colon cancer Sister    • Colon polyps Sister    • Alzheimer's disease Mother    • Coronary artery disease Sister    • Coronary artery disease Sister        Social History     Socioeconomic History   • Marital status:      Spouse name: Not on file   • Number of children: Not on file   • Years of education: Not on " file   • Highest education level: Not on file   Tobacco Use   • Smoking status: Never Smoker   • Smokeless tobacco: Never Used   • Tobacco comment: smoked in highschool   Substance and Sexual Activity   • Alcohol use: No   • Drug use: No           Objective   Physical Exam   Constitutional: He appears well-developed and well-nourished. No distress.   HENT:   Head: Normocephalic.   Nose: Nose normal.   Mouth/Throat: Oropharynx is clear and moist.   Eyes: EOM are normal. Pupils are equal, round, and reactive to light. Right eye exhibits no discharge. Left eye exhibits no discharge. No scleral icterus.   Neck: Normal range of motion. Neck supple. No JVD present. No neck rigidity. No tracheal deviation present. No Brudzinski's sign and no Kernig's sign noted. No thyromegaly present.   Cardiovascular: Normal rate, regular rhythm, normal heart sounds and intact distal pulses. Exam reveals no friction rub.   No murmur heard.  Pulmonary/Chest: Effort normal and breath sounds normal. No stridor. No respiratory distress. He has no wheezes. He has no rales. He exhibits no tenderness.   Abdominal: Soft. Bowel sounds are normal. He exhibits no distension and no mass.   Musculoskeletal: Normal range of motion. He exhibits no edema or tenderness.   Neurological: He is alert. He has normal reflexes. He displays normal reflexes. No cranial nerve deficit or sensory deficit. He exhibits normal muscle tone. He displays no seizure activity. Coordination normal.   Skin: Skin is warm and dry. No rash noted. He is not diaphoretic. No erythema.   Psychiatric: He has a normal mood and affect.   Nursing note and vitals reviewed.      Procedures           ED Course  ED Course as of Mar 03 1742   Sun Mar 03, 2019   1737 Case discussed with the patient wife and patient the patient is confused and encephalopathic I am not sure what the etiology of his confusion is part of the blurry vision can be explained on the basis of his glucose being  elevated but otherwise the other symptoms of confusion not knowing what is going on inappropriate answers and some of this visual hallucinations a day complaining about may be delirium versus side effect of the medication versus small CVA does not showing up on the CT may require MRI I do not suspect meningitis or encephalitis in this patient neurologically is intact otherwise there is no nuchal rigidity and there is no evidence of sepsis  [TS]      ED Course User Index  [TS] Faisal Pascual MD                  [facility]      Final diagnoses:   Altered mental status, unspecified altered mental status type   Confusion   Hyperglycemia            Faisal Pascual MD  03/03/19 0895

## 2019-03-04 ENCOUNTER — APPOINTMENT (OUTPATIENT)
Dept: NEUROLOGY | Facility: HOSPITAL | Age: 64
End: 2019-03-04

## 2019-03-04 ENCOUNTER — APPOINTMENT (OUTPATIENT)
Dept: CT IMAGING | Facility: HOSPITAL | Age: 64
End: 2019-03-04

## 2019-03-04 LAB
ALBUMIN SERPL-MCNC: 4.2 G/DL (ref 3.5–5)
ALBUMIN/GLOB SERPL: 1.9 G/DL (ref 1.1–2.5)
ALP SERPL-CCNC: 61 U/L (ref 24–120)
ALT SERPL W P-5'-P-CCNC: 25 U/L (ref 0–54)
ANION GAP SERPL CALCULATED.3IONS-SCNC: 9 MMOL/L (ref 4–13)
AST SERPL-CCNC: 30 U/L (ref 7–45)
BASOPHILS # BLD AUTO: 0.05 10*3/MM3 (ref 0–0.2)
BASOPHILS NFR BLD AUTO: 0.7 % (ref 0–2)
BILIRUB SERPL-MCNC: 0.8 MG/DL (ref 0.1–1)
BUN BLD-MCNC: 17 MG/DL (ref 5–21)
BUN/CREAT SERPL: 11.5 (ref 7–25)
CALCIUM SPEC-SCNC: 9.2 MG/DL (ref 8.4–10.4)
CHLORIDE SERPL-SCNC: 98 MMOL/L (ref 98–110)
CO2 SERPL-SCNC: 29 MMOL/L (ref 24–31)
CREAT BLD-MCNC: 1.48 MG/DL (ref 0.5–1.4)
DEPRECATED RDW RBC AUTO: 44.1 FL (ref 40–54)
EOSINOPHIL # BLD AUTO: 0.61 10*3/MM3 (ref 0–0.7)
EOSINOPHIL NFR BLD AUTO: 8.5 % (ref 0–4)
ERYTHROCYTE [DISTWIDTH] IN BLOOD BY AUTOMATED COUNT: 13.7 % (ref 12–15)
FOLATE SERPL-MCNC: 10.8 NG/ML (ref 4.78–24.2)
GFR SERPL CREATININE-BSD FRML MDRD: 48 ML/MIN/1.73
GLOBULIN UR ELPH-MCNC: 2.2 GM/DL
GLUCOSE BLD-MCNC: 279 MG/DL (ref 70–100)
GLUCOSE BLDC GLUCOMTR-MCNC: 234 MG/DL (ref 70–130)
GLUCOSE BLDC GLUCOMTR-MCNC: 279 MG/DL (ref 70–130)
GLUCOSE BLDC GLUCOMTR-MCNC: 310 MG/DL (ref 70–130)
GLUCOSE BLDC GLUCOMTR-MCNC: 344 MG/DL (ref 70–130)
HBA1C MFR BLD: 8.7 %
HCT VFR BLD AUTO: 35.5 % (ref 40–52)
HGB BLD-MCNC: 11.8 G/DL (ref 14–18)
IMM GRANULOCYTES # BLD AUTO: 0.02 10*3/MM3 (ref 0–0.05)
IMM GRANULOCYTES NFR BLD AUTO: 0.3 % (ref 0–5)
LYMPHOCYTES # BLD AUTO: 1.74 10*3/MM3 (ref 0.72–4.86)
LYMPHOCYTES NFR BLD AUTO: 24.2 % (ref 15–45)
MAGNESIUM SERPL-MCNC: 1.7 MG/DL (ref 1.4–2.2)
MCH RBC QN AUTO: 29.3 PG (ref 28–32)
MCHC RBC AUTO-ENTMCNC: 33.2 G/DL (ref 33–36)
MCV RBC AUTO: 88.1 FL (ref 82–95)
MONOCYTES # BLD AUTO: 0.71 10*3/MM3 (ref 0.19–1.3)
MONOCYTES NFR BLD AUTO: 9.9 % (ref 4–12)
NEUTROPHILS # BLD AUTO: 4.06 10*3/MM3 (ref 1.87–8.4)
NEUTROPHILS NFR BLD AUTO: 56.4 % (ref 39–78)
NRBC BLD AUTO-RTO: 0 /100 WBC (ref 0–0)
PHOSPHATE SERPL-MCNC: 4.9 MG/DL (ref 2.5–4.5)
PLATELET # BLD AUTO: 227 10*3/MM3 (ref 130–400)
PMV BLD AUTO: 11.6 FL (ref 6–12)
POTASSIUM BLD-SCNC: 4.5 MMOL/L (ref 3.5–5.3)
PROT SERPL-MCNC: 6.4 G/DL (ref 6.3–8.7)
RBC # BLD AUTO: 4.03 10*6/MM3 (ref 4.8–5.9)
SODIUM BLD-SCNC: 136 MMOL/L (ref 135–145)
TSH SERPL DL<=0.05 MIU/L-ACNC: 0.75 MIU/ML (ref 0.47–4.68)
WBC NRBC COR # BLD: 7.19 10*3/MM3 (ref 4.8–10.8)

## 2019-03-04 PROCEDURE — 25010000002 ONDANSETRON PER 1 MG: Performed by: FAMILY MEDICINE

## 2019-03-04 PROCEDURE — 95816 EEG AWAKE AND DROWSY: CPT | Performed by: PSYCHIATRY & NEUROLOGY

## 2019-03-04 PROCEDURE — 80053 COMPREHEN METABOLIC PANEL: CPT | Performed by: FAMILY MEDICINE

## 2019-03-04 PROCEDURE — 94799 UNLISTED PULMONARY SVC/PX: CPT

## 2019-03-04 PROCEDURE — 74176 CT ABD & PELVIS W/O CONTRAST: CPT

## 2019-03-04 PROCEDURE — 85025 COMPLETE CBC W/AUTO DIFF WBC: CPT | Performed by: FAMILY MEDICINE

## 2019-03-04 PROCEDURE — 99255 IP/OBS CONSLTJ NEW/EST HI 80: CPT | Performed by: PSYCHIATRY & NEUROLOGY

## 2019-03-04 PROCEDURE — 83036 HEMOGLOBIN GLYCOSYLATED A1C: CPT | Performed by: FAMILY MEDICINE

## 2019-03-04 PROCEDURE — 63710000001 INSULIN LISPRO (HUMAN) PER 5 UNITS: Performed by: FAMILY MEDICINE

## 2019-03-04 PROCEDURE — 84100 ASSAY OF PHOSPHORUS: CPT | Performed by: FAMILY MEDICINE

## 2019-03-04 PROCEDURE — G0378 HOSPITAL OBSERVATION PER HR: HCPCS

## 2019-03-04 PROCEDURE — 83735 ASSAY OF MAGNESIUM: CPT | Performed by: FAMILY MEDICINE

## 2019-03-04 PROCEDURE — 82962 GLUCOSE BLOOD TEST: CPT

## 2019-03-04 PROCEDURE — 84443 ASSAY THYROID STIM HORMONE: CPT | Performed by: FAMILY MEDICINE

## 2019-03-04 PROCEDURE — 63710000001 INSULIN DETEMIR PER 5 UNITS: Performed by: FAMILY MEDICINE

## 2019-03-04 PROCEDURE — 82746 ASSAY OF FOLIC ACID SERUM: CPT | Performed by: PSYCHIATRY & NEUROLOGY

## 2019-03-04 PROCEDURE — 95819 EEG AWAKE AND ASLEEP: CPT

## 2019-03-04 RX ORDER — IRBESARTAN 300 MG/1
300 TABLET ORAL NIGHTLY
Status: ON HOLD | COMMUNITY
End: 2019-07-23

## 2019-03-04 RX ORDER — LACTULOSE 10 G/15ML
20 SOLUTION ORAL DAILY
COMMUNITY
End: 2021-05-11 | Stop reason: HOSPADM

## 2019-03-04 RX ORDER — INSULIN GLARGINE 100 [IU]/ML
20 INJECTION, SOLUTION SUBCUTANEOUS NIGHTLY
COMMUNITY
End: 2021-02-02

## 2019-03-04 RX ORDER — OXYCODONE HYDROCHLORIDE AND ACETAMINOPHEN 5; 325 MG/1; MG/1
1 TABLET ORAL EVERY 6 HOURS PRN
Status: DISCONTINUED | OUTPATIENT
Start: 2019-03-04 | End: 2019-03-06 | Stop reason: HOSPADM

## 2019-03-04 RX ORDER — METOPROLOL SUCCINATE 25 MG/1
25 TABLET, EXTENDED RELEASE ORAL DAILY
Status: DISCONTINUED | OUTPATIENT
Start: 2019-03-04 | End: 2019-03-04

## 2019-03-04 RX ORDER — METOPROLOL SUCCINATE 25 MG/1
25 TABLET, EXTENDED RELEASE ORAL DAILY
COMMUNITY
End: 2019-03-06 | Stop reason: HOSPADM

## 2019-03-04 RX ORDER — LOSARTAN POTASSIUM 50 MG/1
50 TABLET ORAL
Status: DISCONTINUED | OUTPATIENT
Start: 2019-03-04 | End: 2019-03-06 | Stop reason: HOSPADM

## 2019-03-04 RX ORDER — BUMETANIDE 2 MG/1
2 TABLET ORAL 2 TIMES DAILY PRN
Status: DISCONTINUED | OUTPATIENT
Start: 2019-03-04 | End: 2019-03-06 | Stop reason: HOSPADM

## 2019-03-04 RX ORDER — METOPROLOL SUCCINATE 25 MG/1
25 TABLET, EXTENDED RELEASE ORAL DAILY PRN
Status: DISCONTINUED | OUTPATIENT
Start: 2019-03-04 | End: 2019-03-06 | Stop reason: HOSPADM

## 2019-03-04 RX ADMIN — ONDANSETRON HYDROCHLORIDE 4 MG: 2 INJECTION, SOLUTION INTRAMUSCULAR; INTRAVENOUS at 11:33

## 2019-03-04 RX ADMIN — INSULIN LISPRO 16 UNITS: 100 INJECTION, SOLUTION INTRAVENOUS; SUBCUTANEOUS at 22:52

## 2019-03-04 RX ADMIN — MIDODRINE HYDROCHLORIDE 10 MG: 10 TABLET ORAL at 17:32

## 2019-03-04 RX ADMIN — INSULIN LISPRO 8 UNITS: 100 INJECTION, SOLUTION INTRAVENOUS; SUBCUTANEOUS at 17:31

## 2019-03-04 RX ADMIN — BUMETANIDE 2 MG: 2 TABLET ORAL at 08:15

## 2019-03-04 RX ADMIN — OXYCODONE HYDROCHLORIDE AND ACETAMINOPHEN 1 TABLET: 10; 325 TABLET ORAL at 11:33

## 2019-03-04 RX ADMIN — INSULIN LISPRO 16 UNITS: 100 INJECTION, SOLUTION INTRAVENOUS; SUBCUTANEOUS at 13:15

## 2019-03-04 RX ADMIN — MIDODRINE HYDROCHLORIDE 10 MG: 10 TABLET ORAL at 08:12

## 2019-03-04 RX ADMIN — DULOXETINE HYDROCHLORIDE 60 MG: 30 CAPSULE, DELAYED RELEASE ORAL at 08:12

## 2019-03-04 RX ADMIN — INSULIN LISPRO 8 UNITS: 100 INJECTION, SOLUTION INTRAVENOUS; SUBCUTANEOUS at 08:16

## 2019-03-04 RX ADMIN — LACTULOSE 10 G: 20 SOLUTION ORAL at 08:12

## 2019-03-04 RX ADMIN — ONDANSETRON HYDROCHLORIDE 4 MG: 2 INJECTION, SOLUTION INTRAMUSCULAR; INTRAVENOUS at 23:50

## 2019-03-04 RX ADMIN — OXYCODONE HYDROCHLORIDE AND ACETAMINOPHEN 1 TABLET: 5; 325 TABLET ORAL at 23:50

## 2019-03-04 RX ADMIN — ISOSORBIDE MONONITRATE 30 MG: 30 TABLET, EXTENDED RELEASE ORAL at 08:12

## 2019-03-04 RX ADMIN — INSULIN DETEMIR 15 UNITS: 100 INJECTION, SOLUTION SUBCUTANEOUS at 22:52

## 2019-03-04 RX ADMIN — ONDANSETRON HYDROCHLORIDE 4 MG: 2 INJECTION, SOLUTION INTRAMUSCULAR; INTRAVENOUS at 17:35

## 2019-03-04 RX ADMIN — METHYLCELLULOSE 500 MG: 500 TABLET ORAL at 22:25

## 2019-03-04 RX ADMIN — SODIUM CHLORIDE, PRESERVATIVE FREE 3 ML: 5 INJECTION INTRAVENOUS at 08:11

## 2019-03-04 RX ADMIN — SODIUM CHLORIDE, PRESERVATIVE FREE 3 ML: 5 INJECTION INTRAVENOUS at 22:26

## 2019-03-04 RX ADMIN — MIDODRINE HYDROCHLORIDE 10 MG: 10 TABLET ORAL at 22:25

## 2019-03-04 RX ADMIN — SODIUM CHLORIDE, PRESERVATIVE FREE 10 ML: 5 INJECTION INTRAVENOUS at 23:51

## 2019-03-04 RX ADMIN — METHYLCELLULOSE 500 MG: 500 TABLET ORAL at 08:12

## 2019-03-04 RX ADMIN — ROSUVASTATIN CALCIUM 40 MG: 20 TABLET, FILM COATED ORAL at 08:12

## 2019-03-04 RX ADMIN — LACTULOSE 10 G: 20 SOLUTION ORAL at 22:24

## 2019-03-04 RX ADMIN — OXYCODONE HYDROCHLORIDE AND ACETAMINOPHEN 1 TABLET: 5; 325 TABLET ORAL at 17:33

## 2019-03-04 RX ADMIN — ASPIRIN 81 MG: 81 TABLET, DELAYED RELEASE ORAL at 08:12

## 2019-03-04 RX ADMIN — LOSARTAN POTASSIUM 50 MG: 50 TABLET, FILM COATED ORAL at 17:28

## 2019-03-04 RX ADMIN — LACTULOSE 10 G: 20 SOLUTION ORAL at 17:32

## 2019-03-04 RX ADMIN — OXYCODONE HYDROCHLORIDE AND ACETAMINOPHEN 1 TABLET: 10; 325 TABLET ORAL at 02:48

## 2019-03-04 RX ADMIN — PANTOPRAZOLE SODIUM 40 MG: 40 TABLET, DELAYED RELEASE ORAL at 08:11

## 2019-03-04 NOTE — PROGRESS NOTES
[facility] Medicine Services  INPATIENT PROGRESS NOTE    Length of Stay: 0  Date of Admission: 3/3/2019  Primary Care Physician: Del Sehtty MD    Subjective   Chief Complaint: Right upper quadrant pain.  Altered mental status.    HPI   Altered mental status is back to baseline.  Right upper quadrant pain, patient state has been improving from previous.  Creatinine slightly worse today than yesterday.  Continue workup by Dr. Aviles    Review of Systems   Constitutional: Positive for activity change, appetite change and fatigue. Negative for chills and fever.   HENT: Negative for hearing loss, nosebleeds, tinnitus and trouble swallowing.    Eyes: Negative for visual disturbance.   Respiratory: Negative for cough, chest tightness, shortness of breath and wheezing.    Cardiovascular: Negative for chest pain, palpitations and leg swelling.   Gastrointestinal: Positive for abdominal pain. Negative for abdominal distention, blood in stool, constipation, diarrhea, nausea and vomiting.   Endocrine: Negative for cold intolerance, heat intolerance, polydipsia, polyphagia and polyuria.   Genitourinary: Negative for decreased urine volume, difficulty urinating, dysuria, flank pain, frequency and hematuria.   Musculoskeletal: Positive for arthralgias, gait problem and myalgias. Negative for joint swelling.   Skin: Negative for rash.   Allergic/Immunologic: Negative for immunocompromised state.   Neurological: Negative for dizziness, syncope, weakness, light-headedness and headaches.   Hematological: Negative for adenopathy. Does not bruise/bleed easily.   Psychiatric/Behavioral: Positive for confusion. Negative for sleep disturbance. The patient is not nervous/anxious.           All pertinent negatives and positives are as above. All other systems have been reviewed and are negative unless otherwise stated.     Objective    Temp:  [97 °F (36.1 °C)-99.4 °F (37.4 °C)] 97.5 °F (36.4  °C)  Heart Rate:  [68-93] 77  Resp:  [17-22] 18  BP: (122-157)/(51-88) 125/68    Intake/Output Summary (Last 24 hours) at 3/4/2019 1308  Last data filed at 3/4/2019 1153  Gross per 24 hour   Intake --   Output 350 ml   Net -350 ml     Physical Exam   Constitutional: He is oriented to person, place, and time. He appears well-developed.   HENT:   Head: Normocephalic and atraumatic.   Eyes: Conjunctivae are normal. Pupils are equal, round, and reactive to light.   Neck: Neck supple. No JVD present. No thyromegaly present.   Cardiovascular: Normal rate, regular rhythm, normal heart sounds and intact distal pulses. Exam reveals no gallop and no friction rub.   No murmur heard.  Pulmonary/Chest: Effort normal and breath sounds normal. No respiratory distress. He has no wheezes. He has no rales. He exhibits no tenderness.   Abdominal: Soft. Bowel sounds are normal. He exhibits no distension. There is tenderness. There is guarding. There is no rebound.   Right upper quadrant tenderness/slight guarding.  Gross morbid obesity.   Musculoskeletal: He exhibits no edema, tenderness or deformity.   Lymphadenopathy:     He has no cervical adenopathy.   Neurological: He is alert and oriented to person, place, and time. He displays normal reflexes. No cranial nerve deficit. He exhibits abnormal muscle tone. Coordination abnormal.   Skin: Skin is warm and dry. Capillary refill takes 2 to 3 seconds. No rash noted.   Psychiatric: He has a normal mood and affect. His behavior is normal.   Nursing note and vitals reviewed.      Results Review:  Lab Results (last 24 hours)     Procedure Component Value Units Date/Time    Folate [[id number]] Collected:  03/04/19 0531    Specimen:  Blood Updated:  03/04/19 1214    POC Glucose Once [[id number]]  (Abnormal) Collected:  03/04/19 1128    Specimen:  Blood Updated:  03/04/19 1149     Glucose 310 mg/dL      Comment: : [id number] Segundo MeganMeter ID: [id number]       POC Glucose Once [[id number]]   (Abnormal) Collected:  03/04/19 0809    Specimen:  Blood Updated:  03/04/19 0831     Glucose 279 mg/dL      Comment: : [id number] Ratna Hines ID: [id number]       Hemoglobin A1c [[id number]] Collected:  03/04/19 0531    Specimen:  Blood Updated:  03/04/19 0729     Hemoglobin A1C 8.7 %     Narrative:       Less than 6.0           Non-Diabetic Range  6.0-7.0                 ADA Therapeutic Target  Greater than 7.0        Action Suggested    TSH [[id number]]  (Normal) Collected:  03/04/19 0531    Specimen:  Blood Updated:  03/04/19 0705     TSH 0.751 mIU/mL     Comprehensive Metabolic Panel [[id number]]  (Abnormal) Collected:  03/04/19 0531    Specimen:  Blood Updated:  03/04/19 0642     Glucose 279 mg/dL      BUN 17 mg/dL      Creatinine 1.48 mg/dL      Sodium 136 mmol/L      Potassium 4.5 mmol/L      Chloride 98 mmol/L      CO2 29.0 mmol/L      Calcium 9.2 mg/dL      Total Protein 6.4 g/dL      Albumin 4.20 g/dL      ALT (SGPT) 25 U/L      AST (SGOT) 30 U/L      Alkaline Phosphatase 61 U/L      Total Bilirubin 0.8 mg/dL      eGFR Non African Amer 48 mL/min/1.73      Globulin 2.2 gm/dL      A/G Ratio 1.9 g/dL      BUN/Creatinine Ratio 11.5     Anion Gap 9.0 mmol/L     Magnesium [[id number]]  (Normal) Collected:  03/04/19 0531    Specimen:  Blood Updated:  03/04/19 0640     Magnesium 1.7 mg/dL     Phosphorus [[id number]]  (Abnormal) Collected:  03/04/19 0531    Specimen:  Blood Updated:  03/04/19 0637     Phosphorus 4.9 mg/dL     CBC Auto Differential [[id number]]  (Abnormal) Collected:  03/04/19 0531    Specimen:  Blood Updated:  03/04/19 0624     WBC 7.19 10*3/mm3      RBC 4.03 10*6/mm3      Hemoglobin 11.8 g/dL      Hematocrit 35.5 %      MCV 88.1 fL      MCH 29.3 pg      MCHC 33.2 g/dL      RDW 13.7 %      RDW-SD 44.1 fl      MPV 11.6 fL      Platelets 227 10*3/mm3      Neutrophil % 56.4 %      Lymphocyte % 24.2 %      Monocyte % 9.9 %      Eosinophil % 8.5 %      Basophil % 0.7 %      Immature Grans % 0.3 %       Neutrophils, Absolute 4.06 10*3/mm3      Lymphocytes, Absolute 1.74 10*3/mm3      Monocytes, Absolute 0.71 10*3/mm3      Eosinophils, Absolute 0.61 10*3/mm3      Basophils, Absolute 0.05 10*3/mm3      Immature Grans, Absolute 0.02 10*3/mm3      nRBC 0.0 /100 WBC     POC Glucose Once [[id number]]  (Abnormal) Collected:  03/03/19 2028    Specimen:  Blood Updated:  03/03/19 2100     Glucose 365 mg/dL      Comment: : [id number] Hiram Longo ID: [id number]       POC Glucose Once [[id number]]  (Abnormal) Collected:  03/03/19 1924    Specimen:  Blood Updated:  03/03/19 1936     Glucose 361 mg/dL      Comment: : [id number] Mode [place] ID: [id number]       [place] Draw [[id number]] Collected:  03/03/19 1651    Specimen:  Blood Updated:  03/03/19 1800    Narrative:       The following orders were created for panel order [place] Draw.  Procedure                               Abnormality         Status                     ---------                               -----------         ------                     Red Top[[id number]]                                          Final result                 Please view results for these tests on the individual orders.    Red Top [[id number]] Collected:  03/03/19 1651    Specimen:  Blood Updated:  03/03/19 1800     Extra Tube Hold for add-ons.     Comment: Auto resulted.       Urine Drug Screen - Urine, Clean Catch [[id number]]  (Normal) Collected:  03/03/19 1711    Specimen:  Urine, Clean Catch Updated:  03/03/19 1737     Amphetamine Screen, Urine Negative     Barbiturates Screen, Urine Negative     Benzodiazepine Screen, Urine Negative     Cocaine Screen, Urine Negative     Methadone Screen, Urine Negative     Opiate Screen Negative     Phencyclidine (PCP), Urine Negative     THC, Screen, Urine Negative    Narrative:       Negative Thresholds For Drugs Screened in Urine:    Amphetamines          500 ng/ml  Barbiturates          200 ng/ml  Benzodiazepines       200  ng/ml  Cocaine               150 ng/ml  Methadone             150 ng/ml  Opiates               300 ng/ml  Phencyclidine         25 ng/ml  THC                      50 ng/ml    The normal value for all drugs tested is negative. This report includes final unconfirmed screening results.  A positive result by this assay can be, at your request, sent to the Reference Lab for confirmation by gas chromatography. Unconfirmed results must not be used for non-medical purposes, such as employment or legal testing. Clinical consideration should be applied to any drug of abuse test result, particularly when unconfirmed results are used.    aPTT [[id number]]  (Normal) Collected:  03/03/19 1651    Specimen:  Blood Updated:  03/03/19 1719     PTT 26.3 seconds     Protime-INR [[id number]]  (Normal) Collected:  03/03/19 1651    Specimen:  Blood Updated:  03/03/19 1719     Protime 13.1 Seconds      INR 0.96    Comprehensive Metabolic Panel [[id number]]  (Abnormal) Collected:  03/03/19 1651    Specimen:  Blood Updated:  03/03/19 1717     Glucose 496 mg/dL      BUN 19 mg/dL      Creatinine 1.07 mg/dL      Sodium 131 mmol/L      Potassium 4.8 mmol/L      Chloride 96 mmol/L      CO2 26.0 mmol/L      Calcium 9.8 mg/dL      Total Protein 6.8 g/dL      Albumin 4.50 g/dL      ALT (SGPT) 20 U/L      AST (SGOT) 24 U/L      Alkaline Phosphatase 66 U/L      Total Bilirubin 0.6 mg/dL      eGFR Non African Amer 70 mL/min/1.73      Globulin 2.3 gm/dL      A/G Ratio 2.0 g/dL      BUN/Creatinine Ratio 17.8     Anion Gap 9.0 mmol/L     Lactic Acid, Plasma [[id number]]  (Normal) Collected:  03/03/19 1651    Specimen:  Blood Updated:  03/03/19 1710     Lactate 1.7 mmol/L     Ammonia [[id number]]  (Abnormal) Collected:  03/03/19 1651    Specimen:  Blood Updated:  03/03/19 1710     Ammonia <9 umol/L     CBC & Differential [[id number]] Collected:  03/03/19 1651    Specimen:  Blood Updated:  03/03/19 1702    Narrative:       The following orders were created for  panel order CBC & Differential.  Procedure                               Abnormality         Status                     ---------                               -----------         ------                     CBC Auto Differential[[id number]]        Abnormal            Final result                 Please view results for these tests on the individual orders.    CBC Auto Differential [[id number]]  (Abnormal) Collected:  03/03/19 1651    Specimen:  Blood Updated:  03/03/19 1702     WBC 9.74 10*3/mm3      RBC 3.96 10*6/mm3      Hemoglobin 11.8 g/dL      Hematocrit 33.9 %      MCV 85.6 fL      MCH 29.8 pg      MCHC 34.8 g/dL      RDW 13.4 %      RDW-SD 41.9 fl      MPV 11.4 fL      Platelets 248 10*3/mm3      Neutrophil % 70.6 %      Lymphocyte % 15.8 %      Monocyte % 8.5 %      Eosinophil % 4.3 %      Basophil % 0.5 %      Immature Grans % 0.3 %      Neutrophils, Absolute 6.87 10*3/mm3      Lymphocytes, Absolute 1.54 10*3/mm3      Monocytes, Absolute 0.83 10*3/mm3      Eosinophils, Absolute 0.42 10*3/mm3      Basophils, Absolute 0.05 10*3/mm3      Immature Grans, Absolute 0.03 10*3/mm3      nRBC 0.0 /100 WBC     Blood Gas, Arterial [[id number]] Collected:  03/03/19 1655    Specimen:  Arterial Blood Updated:  03/03/19 1701     Site Right Radial     Dae's Test Positive     pH, Arterial 7.427 pH units      pCO2, Arterial 38.6 mm Hg      pO2, Arterial 84.0 mm Hg      HCO3, Arterial 25.4 mmol/L      Base Excess, Arterial 1.1 mmol/L      O2 Saturation, Arterial 97.5 %      Temperature 37.0 C      Barometric Pressure for Blood Gas 754 mmHg      Modality Room Air     Ventilator Mode NA     Collected by [id number]     Comment: Meter: [id number]     :  [id number]              Cultures:       Radiology Data:    Imaging Results (last 24 hours)     Procedure Component Value Units Date/Time    CT Head Without Contrast [[id number]] Collected:  03/03/19 1646     Updated:  03/03/19 1650    Narrative:       EXAMINATION: CT HEAD WO  "CONTRAST-      3/3/2019 4:34 PM CST     HISTORY: Confusion/delirium, altered LOC, unexplained     In order to have a CT radiation dose as low as reasonably achievable  Automated Exposure Control was utilized for adjustment of the mA and/or  KV according to patient size.     DLP in mGycm= 1411 (rescan due to motion).     Noncontrast head CT compared with 2/7/2019.     Axial, sagittal, and coronal noncontrast CT imaging of the head.     The visualized paranasal sinuses are clear.     The brain and ventricles have an age appropriate appearance.   There is no hemorrhage or mass-effect.   No acute infarction is seen.     No calvarial abnormality.       Impression:       1. No acute intracranial abnormality is seen.                                         This report was finalized on 03/03/2019 16:47 by Dr. Gomez Mcgill MD.          Allergies   Allergen Reactions   • Bactrim [Sulfamethoxazole-Trimethoprim] Other (See Comments)     \"RENAL FAILURE\"       Scheduled meds:     allopurinol 100 mg Oral Daily   amitriptyline 50 mg Oral Nightly   aspirin 81 mg Oral Daily   bumetanide 2 mg Oral Daily   DULoxetine 60 mg Oral Daily   escitalopram 10 mg Oral Daily   insulin lispro 0-24 Units Subcutaneous 4x Daily With Meals & Nightly   isosorbide mononitrate 30 mg Oral Daily   lactulose 10 g Oral TID   methylcellulose (Laxative) 1 tablet Oral BID   metOLazone 2.5 mg Oral Daily   midodrine 10 mg Oral TID   nebivolol 5 mg Oral Daily   pantoprazole 40 mg Oral Daily   rosuvastatin 40 mg Oral Daily   sodium chloride 3 mL Intravenous Q12H       PRN meds:  •  acetaminophen  •  colchicine  •  dextrose  •  dextrose  •  glucagon (human recombinant)  •  ondansetron  •  ondansetron ODT  •  oxyCODONE-acetaminophen  •  sodium chloride    Assessment/Plan       Altered mental status      Plan:  Altered mental status/hallucination.  Improving.  Consult neurology.  CT scan head shows no acute changes.  EEG is pending.  B12 and folic pending.  Consider " Akshat as outpatient per nephrology.    Diabetes.  High Sliding scale.  Levemir 15 units twice a day.  [id number] 8.7.    CAD/hyperlipidemia.  Continue aspirin.  Continue Bumex prn per pt.   Continue metoprolol prn pt.  Continue Crestor.  Echocardiogram 18 ejection fraction 56%, moderate concentric hypertrophy, no hemodynamically significant valvular abnormality.    Hypotension/diagnoses autonomicdisturbance.  Continue Midodrin    Chronic kidney disease stage III.  Creatinine 1.48.  See Dr. Lomas as outpatient.     Right upper quadrant pain.  CT scan abdomen 19- mesenteric venous air and portal  venous air is resolved, thickening the wall of the ascending and transverse colon-possible ischemic colitis.  CT scan abdomen pelvic without contrast due to kidney function.     Reflux.  Continue Protonix.    Chronic pain.  Continue Percocet.  Drug screen was negative.    Chronic constipation.  Continue lactulose.  Continue Citrucel.    Depression/anxiety/insomnia.   Continue Cymbalta.      Gross morbid obesity.  BMI is 41.    Nutrition- consistent carb diet.     Deconditioning.  PT and OT consult    Discharge Plannin-3 days.    Telly Rodriguez MD   19   1:08 PM

## 2019-03-04 NOTE — PAYOR COMM NOTE
"FROM: JAE CLEVELAND  PHONE: [phone]  FAX: [phone]    PENDING: [id number]    Erick Luong (63 y.o. Male)     Date of Birth Social Security Number Address Home Phone MRN    1955  [id number]  [place] [phone] [id number]    [employer]aritan Marital Status          Confucianist        Admission Date Admission Type Admitting Provider Attending Provider Department, Room/Bed    3/3/19 Emergency Telly Rodriguez MD Truong, Khai C, MD [facility] 3A, 338/1    Discharge Date Discharge Disposition Discharge Destination                       Attending Provider:  Telly Rodriguez MD    Allergies:  Bactrim [Sulfamethoxazole-trimethoprim]    Isolation:  None   Infection:  None   Code Status:  CPR    Ht:  182.9 cm (72\")   Wt:  140 kg (309 lb 9.6 oz)    Admission Cmt:  None   Principal Problem:  None                Active Insurance as of 3/3/2019     Primary Coverage     Payor Plan Insurance Group Employer/Plan Group    [employer] [id number]     Payor Plan Address Payor Plan Phone Number Payor Plan Fax Number Effective Dates    [address] [phone]  1/1/2018 - None Entered    [facility] [id number]       Subscriber Name Subscriber Birth Date Member ID       ZAINAB LUONG 11/27/1969 [id number]           Secondary Coverage     Payor Plan Insurance Group Employer/Plan Group    MEDICARE MEDICARE A & B      Payor Plan Address Payor Plan Phone Number Payor Plan Fax Number Effective Dates    [address] [phone]  7/1/2013 - None Entered    [facility] [id number]       Subscriber Name Subscriber Birth Date Member ID       ERICK LUONG 1955 [id number]                 Emergency Contacts      (Rel.) Home Phone Work Phone Mobile Phone    Zainab Luong (Spouse) [phone]               History & Physical      Alejandrina Barnett DO at 3/3/2019  6:10 PM              [facility] " Hospital Medicine Services  HISTORY AND PHYSICAL    Date of Admission: 3/3/2019  Primary Care Physician: Del Shetty MD    Subjective     Chief Complaint: confusion    History of Present Illness  Per wife onset of confusion last PM.  Talking to the dead.  Mistook wife for sister.  Did slowly improve and was felt to resolve but was restless all night getting up and down.  He go up this AM and had similar confusion.  Did have a falling out episode while urinating but per wife this is not uncommon due to an autonomic disturbance he has while urinating.   He sees Dr Lomas for this.  Mental status waxed and waned all day and finally she brought him to the ER.  Has not seen neurology for symptoms.     Review of Systems     Otherwise complete ROS reviewed and negative except as mentioned in the HPI.    Past Medical History:   Past Medical History:   Diagnosis Date   • Arthritis    • CHF (congestive heart failure) (CMS/HCC)    • Coronary artery disease    • Diabetes mellitus (CMS/HCC)    • Hyperlipidemia    • Hypertension    • Myocardial infarction (CMS/HCC)    • Pancreatitis    • Renal disorder    • Sleep apnea with use of continuous positive airway pressure (CPAP)    Autonomic dysfunction  Orthostatic blood pressure  Recent air in gut    Past Surgical History:  Past Surgical History:   Procedure Laterality Date   • ABDOMINAL SURGERY     • APPENDECTOMY     • BACK SURGERY     • CARDIAC SURGERY     • CATARACT EXTRACTION     • CERVICAL SPINE SURGERY     • COLONOSCOPY N/A 1/31/2017    Normal exam repeat in 5 years   • COLONOSCOPY N/A 2/11/2019    Procedure: COLONOSCOPY WITH ANESTHESIA;  Surgeon: Tyrell Smith MD;  Location: [facility] ENDOSCOPY;  Service: Gastroenterology   • COLONOSCOPY W/ POLYPECTOMY  03/04/2014    Hyperplastic polyp   • CORONARY ARTERY BYPASS GRAFT  10/2015   • ENDOSCOPY  04/13/2011    Gastritis with hemorrhage   • ENDOSCOPY N/A 5/5/2017    Normal exam   • ENDOSCOPY N/A 2/11/2019    Procedure:  "ESOPHAGOGASTRODUODENOSCOPY WITH ANESTHESIA;  Surgeon: Tyrell Smith MD;  Location: [facility] ENDOSCOPY;  Service: Gastroenterology   • INCISION AND DRAINAGE OF WOUND Left 09/2007    spider bite     Social History:     Retired  No alcohol, drugs, tobacco  DPOA use nok wife  Living will none  Code status full  PCP Diogenes SARABIA [facility] cervantes  Neph Ali    Family History: family history includes Alzheimer's disease in his mother; Colon cancer in his father and sister; Colon polyps in his sister; Coronary artery disease in his sister and sister; Heart disease in his father.       Allergies:  Allergies   Allergen Reactions   • Bactrim [Sulfamethoxazole-Trimethoprim] Other (See Comments)     \"RENAL FAILURE\"     Medications:  Prior to Admission medications    Medication Sig Start Date End Date Taking? Authorizing Provider   allopurinol (ZYLOPRIM) 100 MG tablet Take 100 mg by mouth Daily.    Bossman Blount MD   amitriptyline (ELAVIL) 50 MG tablet Take 50 mg by mouth Every Night.    Bossman Blount MD   aspirin 81 MG EC tablet Take 81 mg by mouth Daily.    Bossman Blount MD   bumetanide (BUMEX) 2 MG tablet Take 2 mg by mouth Daily.    Bossman Blount MD   CITRUCEL oral powder Take 2 application by mouth Daily for 30 doses. 2/13/19 3/15/19  Ken Perry MD   colchicine 0.6 MG tablet Take 0.6 mg by mouth 2 (Two) Times a Day As Needed for Muscle / Joint Pain.    Bossman Blount MD   DULoxetine (CYMBALTA) 60 MG capsule Take 60 mg by mouth Daily.    Bossman Blount MD   escitalopram (LEXAPRO) 10 MG tablet Take 10 mg by mouth Daily.    Bossman Blount MD   insulin regular (humuLIN R) 500 UNIT/ML CONCENTRATED injection Inject 100 Units under the skin 3 (Three) Times a Day Before Meals. Packaging states 100 units with regular meals; 120 units with large meals or 360 units daily    Bossman Blount MD   isosorbide mononitrate (IMDUR) 30 MG 24 hr tablet Take 30 mg by mouth Daily.   " " Bossman Blount MD   lactulose (CEPHULAC) 10 g packet Take 1 packet by mouth 3 (Three) Times a Day. 2/13/19   Ken Perry MD   linaclotide (LINZESS) 290 MCG capsule capsule Take 290 mcg by mouth Every Morning Before Breakfast.    Bossman Blount MD   metOLazone (ZAROXOLYN) 2.5 MG tablet Take 2.5 mg by mouth Daily.    Bossman Blount MD   midodrine (PROAMATINE) 5 MG tablet Take 10 mg by mouth 3 (Three) Times a Day. 1/11/19   Bossman Blount MD   nebivolol (BYSTOLIC) 5 MG tablet Take 5 mg by mouth Daily.    Bossman Blount MD   ondansetron ODT (ZOFRAN-ODT) 4 MG disintegrating tablet Take 4 mg by mouth 4 (Four) Times a Day As Needed for Nausea or Vomiting.    Bossman Blount MD   oxyCODONE-acetaminophen (PERCOCET)  MG per tablet Take 1 tablet by mouth Every 6 (Six) Hours As Needed for Moderate Pain .    Bossman Blount MD   pantoprazole (PROTONIX) 40 MG EC tablet Take 1 tablet by mouth Daily. 9/19/17   Emeka Garay MD   rosuvastatin (CRESTOR) 40 MG tablet Take 40 mg by mouth Daily.    Bossman Blount MD     Objective     Vital Signs: /79   Pulse 68   Temp 97.6 °F (36.4 °C)   Resp 17   Ht 182.9 cm (72\")   Wt (!) 140 kg (309 lb)   SpO2 97%   BMI 41.91 kg/m²    Physical Exam   Constitutional: He is oriented to person, place, and time. He appears well-developed and well-nourished.   HENT:   Head: Normocephalic and atraumatic.   Right Ear: External ear normal.   Left Ear: External ear normal.   Nose: Nose normal.   Mouth/Throat: Oropharyngeal exudate present.   Eyes: Conjunctivae and EOM are normal. Pupils are equal, round, and reactive to light.   Neck: Normal range of motion. Neck supple. No JVD present.   Cardiovascular: Normal rate, regular rhythm, normal heart sounds and intact distal pulses.   Pulmonary/Chest: Effort normal and breath sounds normal.   Abdominal: Soft. Bowel sounds are normal.   Musculoskeletal: Normal range of motion. "   Neurological: He is alert and oriented to person, place, and time.   Skin: Skin is warm and dry.   Psychiatric: He has a normal mood and affect. His behavior is normal.           Results Reviewed:  Lab Results (last 24 hours)     Procedure Component Value Units Date/Time    [place] Draw [[id number]] Collected:  03/03/19 1651    Specimen:  Blood Updated:  03/03/19 1800    Narrative:       The following orders were created for panel order [place] Draw.  Procedure                               Abnormality         Status                     ---------                               -----------         ------                     Red Top[[id number]]                                          Final result                 Please view results for these tests on the individual orders.    Red Top [[id number]] Collected:  03/03/19 1651    Specimen:  Blood Updated:  03/03/19 1800     Extra Tube Hold for add-ons.     Comment: Auto resulted.       Urine Drug Screen - Urine, Clean Catch [[id number]]  (Normal) Collected:  03/03/19 1711    Specimen:  Urine, Clean Catch Updated:  03/03/19 1737     Amphetamine Screen, Urine Negative     Barbiturates Screen, Urine Negative     Benzodiazepine Screen, Urine Negative     Cocaine Screen, Urine Negative     Methadone Screen, Urine Negative     Opiate Screen Negative     Phencyclidine (PCP), Urine Negative     THC, Screen, Urine Negative    Narrative:       Negative Thresholds For Drugs Screened in Urine:    Amphetamines          500 ng/ml  Barbiturates          200 ng/ml  Benzodiazepines       200 ng/ml  Cocaine               150 ng/ml  Methadone             150 ng/ml  Opiates               300 ng/ml  Phencyclidine         25 ng/ml  THC                      50 ng/ml    The normal value for all drugs tested is negative. This report includes final unconfirmed screening results.  A positive result by this assay can be, at your request, sent to the Reference Lab for confirmation by gas chromatography.  Unconfirmed results must not be used for non-medical purposes, such as employment or legal testing. Clinical consideration should be applied to any drug of abuse test result, particularly when unconfirmed results are used.    aPTT [[id number]]  (Normal) Collected:  03/03/19 1651    Specimen:  Blood Updated:  03/03/19 1719     PTT 26.3 seconds     Protime-INR [[id number]]  (Normal) Collected:  03/03/19 1651    Specimen:  Blood Updated:  03/03/19 1719     Protime 13.1 Seconds      INR 0.96    Comprehensive Metabolic Panel [[id number]]  (Abnormal) Collected:  03/03/19 1651    Specimen:  Blood Updated:  03/03/19 1717     Glucose 496 mg/dL      BUN 19 mg/dL      Creatinine 1.07 mg/dL      Sodium 131 mmol/L      Potassium 4.8 mmol/L      Chloride 96 mmol/L      CO2 26.0 mmol/L      Calcium 9.8 mg/dL      Total Protein 6.8 g/dL      Albumin 4.50 g/dL      ALT (SGPT) 20 U/L      AST (SGOT) 24 U/L      Alkaline Phosphatase 66 U/L      Total Bilirubin 0.6 mg/dL      eGFR Non African Amer 70 mL/min/1.73      Globulin 2.3 gm/dL      A/G Ratio 2.0 g/dL      BUN/Creatinine Ratio 17.8     Anion Gap 9.0 mmol/L     Lactic Acid, Plasma [[id number]]  (Normal) Collected:  03/03/19 1651    Specimen:  Blood Updated:  03/03/19 1710     Lactate 1.7 mmol/L     Ammonia [[id number]]  (Abnormal) Collected:  03/03/19 1651    Specimen:  Blood Updated:  03/03/19 1710     Ammonia <9 umol/L     CBC & Differential [[id number]] Collected:  03/03/19 1651    Specimen:  Blood Updated:  03/03/19 1702    Narrative:       The following orders were created for panel order CBC & Differential.  Procedure                               Abnormality         Status                     ---------                               -----------         ------                     CBC Auto Differential[[id number]]        Abnormal            Final result                 Please view results for these tests on the individual orders.    CBC Auto Differential [[id number]]  (Abnormal)  Collected:  03/03/19 1651    Specimen:  Blood Updated:  03/03/19 1702     WBC 9.74 10*3/mm3      RBC 3.96 10*6/mm3      Hemoglobin 11.8 g/dL      Hematocrit 33.9 %      MCV 85.6 fL      MCH 29.8 pg      MCHC 34.8 g/dL      RDW 13.4 %      RDW-SD 41.9 fl      MPV 11.4 fL      Platelets 248 10*3/mm3      Neutrophil % 70.6 %      Lymphocyte % 15.8 %      Monocyte % 8.5 %      Eosinophil % 4.3 %      Basophil % 0.5 %      Immature Grans % 0.3 %      Neutrophils, Absolute 6.87 10*3/mm3      Lymphocytes, Absolute 1.54 10*3/mm3      Monocytes, Absolute 0.83 10*3/mm3      Eosinophils, Absolute 0.42 10*3/mm3      Basophils, Absolute 0.05 10*3/mm3      Immature Grans, Absolute 0.03 10*3/mm3      nRBC 0.0 /100 WBC     Blood Gas, Arterial [[id number]] Collected:  03/03/19 1655    Specimen:  Arterial Blood Updated:  03/03/19 1701     Site Right Radial     Dae's Test Positive     pH, Arterial 7.427 pH units      pCO2, Arterial 38.6 mm Hg      pO2, Arterial 84.0 mm Hg      HCO3, Arterial 25.4 mmol/L      Base Excess, Arterial 1.1 mmol/L      O2 Saturation, Arterial 97.5 %      Temperature 37.0 C      Barometric Pressure for Blood Gas 754 mmHg      Modality Room Air     Ventilator Mode NA     Collected by [id number]     Comment: Meter: [id number]     :  [id number]           Imaging Results (last 24 hours)     Procedure Component Value Units Date/Time    CT Head Without Contrast [[id number]] Collected:  03/03/19 1646     Updated:  03/03/19 1650    Narrative:       EXAMINATION: CT HEAD WO CONTRAST-      3/3/2019 4:34 PM CST     HISTORY: Confusion/delirium, altered LOC, unexplained     In order to have a CT radiation dose as low as reasonably achievable  Automated Exposure Control was utilized for adjustment of the mA and/or  KV according to patient size.     DLP in mGycm= 1411 (rescan due to motion).     Noncontrast head CT compared with 2/7/2019.     Axial, sagittal, and coronal noncontrast CT imaging of the head.     The  visualized paranasal sinuses are clear.     The brain and ventricles have an age appropriate appearance.   There is no hemorrhage or mass-effect.   No acute infarction is seen.     No calvarial abnormality.       Impression:       1. No acute intracranial abnormality is seen.                                         This report was finalized on 03/03/2019 16:47 by Dr. Gomez Mcgill MD.        I have personally reviewed and interpreted the radiology studies and ECG obtained at time of admission.     Assessment / Plan     Assessment:     Hallucinations  Confusion, episodic  DM II hyperglycemia  HTN  HLD  CAD    Plan:      Admit  Consult neurology/nephrology  glucometers  Resume home medications  Repeat lab work in AM  See orders.   Imaging will be left to discretion of the nerurolgoist.   Get correct does of patient medications ie insulin    Code Status: full     I discussed the patient's findings and my recommendations with the patient and wife.       Estimated length of stay 1-3 days    Alejandrina Barnett DO   03/03/19   6:11 PM              Electronically signed by Alejandrina Barnett DO at 3/3/2019  6:20 PM          Emergency Department Notes      Faisal Pascual MD at 3/3/2019  4:11 PM          Subjective   Patient with multiple medical problems came to the ED with confusion recently the patient was admitted to the hospital with air in his portal system no diagnostic reason was assessed for this the patient was admitted to the hospital repeat CT revealed the air to be resolved and the patient discharged home now the family is over here with altered level of consciousness patient is awake and alert somewhat confused no fever no vomiting        Altered Mental Status   Presenting symptoms: behavior changes and disorientation    Severity:  Mild  Most recent episode:  Yesterday  Episode history:  Single  Timing:  Constant  Progression:  Unchanged  Chronicity:  New  Context: not alcohol use, not dementia, not drug use,  "not head injury, not homeless, not recent change in medication, not recent illness and not recent infection    Associated symptoms: abdominal pain, nausea and weakness    Associated symptoms: normal movement, no agitation, no bladder incontinence, no difficulty breathing, no eye deviation, no fever, no hallucinations, no headaches, no light-headedness, no rash, no seizures, no slurred speech, no suicidal behavior, no visual change and no vomiting    Abdominal Pain   Associated symptoms: nausea    Associated symptoms: no chest pain, no chills, no cough, no fatigue, no fever and no vomiting        Review of Systems   Constitutional: Negative for chills, fatigue and fever.   HENT: Negative.  Negative for congestion.    Respiratory: Negative.  Negative for cough, chest tightness and stridor.    Cardiovascular: Negative.  Negative for chest pain.   Gastrointestinal: Positive for abdominal pain and nausea. Negative for abdominal distention and vomiting.   Endocrine: Negative.    Genitourinary: Negative.  Negative for bladder incontinence, difficulty urinating and flank pain.   Musculoskeletal: Negative.    Skin: Negative.  Negative for color change and rash.   Neurological: Positive for weakness. Negative for dizziness, seizures, light-headedness and headaches.   Psychiatric/Behavioral: Negative for agitation and hallucinations.   All other systems reviewed and are negative.      Past Medical History:   Diagnosis Date   • Arthritis    • CHF (congestive heart failure) (CMS/HCC)    • Coronary artery disease    • Diabetes mellitus (CMS/HCC)    • Hyperlipidemia    • Hypertension    • Myocardial infarction (CMS/HCC)    • Pancreatitis    • Renal disorder    • Sleep apnea with use of continuous positive airway pressure (CPAP)        Allergies   Allergen Reactions   • Bactrim [Sulfamethoxazole-Trimethoprim] Other (See Comments)     \"RENAL FAILURE\"       Past Surgical History:   Procedure Laterality Date   • ABDOMINAL SURGERY   "   • APPENDECTOMY     • BACK SURGERY     • CARDIAC SURGERY     • CATARACT EXTRACTION     • CERVICAL SPINE SURGERY     • COLONOSCOPY N/A 1/31/2017    Normal exam repeat in 5 years   • COLONOSCOPY N/A 2/11/2019    Procedure: COLONOSCOPY WITH ANESTHESIA;  Surgeon: Tyrell Smith MD;  Location: [facility] ENDOSCOPY;  Service: Gastroenterology   • COLONOSCOPY W/ POLYPECTOMY  03/04/2014    Hyperplastic polyp   • CORONARY ARTERY BYPASS GRAFT  10/2015   • ENDOSCOPY  04/13/2011    Gastritis with hemorrhage   • ENDOSCOPY N/A 5/5/2017    Normal exam   • ENDOSCOPY N/A 2/11/2019    Procedure: ESOPHAGOGASTRODUODENOSCOPY WITH ANESTHESIA;  Surgeon: Tyrell Smith MD;  Location: [facility] ENDOSCOPY;  Service: Gastroenterology   • INCISION AND DRAINAGE OF WOUND Left 09/2007    spider bite       Family History   Problem Relation Age of Onset   • Colon cancer Father    • Heart disease Father    • Colon cancer Sister    • Colon polyps Sister    • Alzheimer's disease Mother    • Coronary artery disease Sister    • Coronary artery disease Sister        Social History     Socioeconomic History   • Marital status:      Spouse name: Not on file   • Number of children: Not on file   • Years of education: Not on file   • Highest education level: Not on file   Tobacco Use   • Smoking status: Never Smoker   • Smokeless tobacco: Never Used   • Tobacco comment: smoked in highschool   Substance and Sexual Activity   • Alcohol use: No   • Drug use: No           Objective   Physical Exam   Constitutional: He appears well-developed and well-nourished. No distress.   HENT:   Head: Normocephalic.   Nose: Nose normal.   Mouth/Throat: Oropharynx is clear and moist.   Eyes: EOM are normal. Pupils are equal, round, and reactive to light. Right eye exhibits no discharge. Left eye exhibits no discharge. No scleral icterus.   Neck: Normal range of motion. Neck supple. No JVD present. No neck rigidity. No tracheal deviation present. No Brudzinski's sign  and no Kernig's sign noted. No thyromegaly present.   Cardiovascular: Normal rate, regular rhythm, normal heart sounds and intact distal pulses. Exam reveals no friction rub.   No murmur heard.  Pulmonary/Chest: Effort normal and breath sounds normal. No stridor. No respiratory distress. He has no wheezes. He has no rales. He exhibits no tenderness.   Abdominal: Soft. Bowel sounds are normal. He exhibits no distension and no mass.   Musculoskeletal: Normal range of motion. He exhibits no edema or tenderness.   Neurological: He is alert. He has normal reflexes. He displays normal reflexes. No cranial nerve deficit or sensory deficit. He exhibits normal muscle tone. He displays no seizure activity. Coordination normal.   Skin: Skin is warm and dry. No rash noted. He is not diaphoretic. No erythema.   Psychiatric: He has a normal mood and affect.   Nursing note and vitals reviewed.      Procedures          ED Course  ED Course as of Mar 03 1742   Sun Mar 03, 2019   1737 Case discussed with the patient wife and patient the patient is confused and encephalopathic I am not sure what the etiology of his confusion is part of the blurry vision can be explained on the basis of his glucose being elevated but otherwise the other symptoms of confusion not knowing what is going on inappropriate answers and some of this visual hallucinations a day complaining about may be delirium versus side effect of the medication versus small CVA does not showing up on the CT may require MRI I do not suspect meningitis or encephalitis in this patient neurologically is intact otherwise there is no nuchal rigidity and there is no evidence of sepsis  [TS]      ED Course User Index  [TS] Faisal Pascual MD                  [facility]      Final diagnoses:   Altered mental status, unspecified altered mental status type   Confusion   Hyperglycemia            Faisal Pascual MD  03/03/19 1742      Electronically signed by Faisal Pascual MD at 3/3/2019  5:42  "PM       ICU Vital Signs     Row Name 03/04/19 1153 03/04/19 0949 03/04/19 0854 03/04/19 0800 03/04/19 0359       Vitals    Temp  97.5 °F (36.4 °C)  --  98 °F (36.7 °C)  --  98 °F (36.7 °C)    Temp src  Oral  --  Oral  --  Oral    Pulse  77  73  79  --  74    Heart Rate Source  Monitor  Monitor  Monitor  --  Monitor    Resp  18  --  18  --  18    Resp Rate Source  Visual  --  Visual  --  Visual    BP  125/68  --  150/74  --  126/88    Noninvasive MAP (mmHg)  78  --  90  --  --    BP Location  Left arm  --  Left arm  --  Right arm    BP Method  Automatic  --  Automatic  --  Automatic    Patient Position  Sitting  --  Sitting  --  Lying       Oxygen Therapy    SpO2  96 %  99 %  98 %  --  98 %    Pulse Oximetry Type  Intermittent  Continuous  Intermittent  --  --    Device (Oxygen Therapy)  room air  room air  room air  room air  room air    Row Name 03/03/19 2323 03/03/19 2031 03/03/19 1929 03/03/19 1846 03/03/19 1800       Height and Weight    Height  --  --  182.9 cm (72\")  --  --    Height Method  --  --  Actual  --  --    Weight  --  --  140 kg (309 lb 9.6 oz)  (Abnormal)   --  --    Weight Method  --  --  Bed scale  --  --    Ideal Body Weight (IBW) (kg)  --  --  82.07  --  --    BSA (Calculated - sq m)  --  --  2.57 sq meters  --  --    BMI (Calculated)  --  --  42  --  --    Weight in (lb) to have BMI = 25  --  --  183.9  --  --       Vitals    Temp  99.4 °F (37.4 °C)  --  98 °F (36.7 °C)  --  97.6 °F (36.4 °C)    Temp src  Oral  --  Oral  --  --    Pulse  78  73  83  89  68    Heart Rate Source  Monitor  Monitor  Monitor  --  --    Resp  18  --  18  --  17    Resp Rate Source  Visual  --  Visual  --  --    BP  131/51  --  147/78  --  --    BP Location  Right arm  --  Right arm  --  --    BP Method  Automatic  --  Automatic  --  --    Patient Position  Lying  --  Lying  --  --       Oxygen Therapy    SpO2  92 %  96 %  97 %  97 %  97 %    Pulse Oximetry Type  --  Continuous  --  --  --    Device (Oxygen Therapy) " " room air  room air  room air  --  --    Row Name 03/03/19 1732 03/03/19 1717 03/03/19 1715 03/03/19 1706 03/03/19 1658       Vitals    Pulse  81  87  80  79  79    Resp  --  20  --  --  --    BP  147/79  122/81  157/77  --  --       Oxygen Therapy    SpO2  95 %  98 %  96 %  99 %  99 %    Row Name 03/03/19 1600                   Height and Weight    Height  182.9 cm (72\")        Height Method  Stated        Weight  140 kg (309 lb)  (Abnormal)         Weight Method  Standing scale        Ideal Body Weight (IBW) (kg)  82.07        BSA (Calculated - sq m)  2.56 sq meters        BMI (Calculated)  41.9        Weight in (lb) to have BMI = 25  183.9           Vitals    Temp  97 °F (36.1 °C)        Pulse  93        Resp  22        BP  151/81           Oxygen Therapy    SpO2  99 %        Device (Oxygen Therapy)  room air            Hospital Medications (all)       Dose Frequency Start End    acetaminophen (TYLENOL) tablet 650 mg 650 mg Every 4 Hours PRN 3/3/2019     Sig - Route: Take 2 tablets by mouth Every 4 (Four) Hours As Needed for Mild Pain . - Oral    allopurinol (ZYLOPRIM) tablet 100 mg 100 mg Daily 3/4/2019     Sig - Route: Take 1 tablet by mouth Daily. - Oral    amitriptyline (ELAVIL) tablet 50 mg 50 mg Nightly 3/3/2019     Sig - Route: Take 2 tablets by mouth Every Night. - Oral    aspirin EC tablet 81 mg 81 mg Daily 3/4/2019     Sig - Route: Take 1 tablet by mouth Daily. - Oral    bumetanide (BUMEX) tablet 2 mg 2 mg Daily 3/4/2019     Sig - Route: Take 1 tablet by mouth Daily. - Oral    colchicine tablet 0.6 mg 0.6 mg 2 Times Daily PRN 3/3/2019     Sig - Route: Take 1 tablet by mouth 2 (Two) Times a Day As Needed for Muscle / Joint Pain. - Oral    dextrose (D50W) 25 g/ 50mL Intravenous Solution 25 g 25 g Every 15 Minutes PRN 3/3/2019     Sig - Route: Infuse 50 mL into a venous catheter Every 15 (Fifteen) Minutes As Needed for Low Blood Sugar (Blood Sugar Less Than 70). - Intravenous    dextrose (GLUTOSE) oral gel " 15 g 15 g Every 15 Minutes PRN 3/3/2019     Sig - Route: Take 15 g by mouth Every 15 (Fifteen) Minutes As Needed for Low Blood Sugar (Blood sugar less than 70). - Oral    DULoxetine (CYMBALTA) DR capsule 60 mg 60 mg Daily 3/4/2019     Sig - Route: Take 2 capsules by mouth Daily. - Oral    escitalopram (LEXAPRO) tablet 10 mg 10 mg Daily 3/4/2019     Sig - Route: Take 1 tablet by mouth Daily. - Oral    glucagon (human recombinant) (GLUCAGEN DIAGNOSTIC) injection 1 mg 1 mg As Needed 3/3/2019     Sig - Route: Inject 1 mg under the skin into the appropriate area as directed As Needed (Blood Glucose Less Than 70). - Subcutaneous    insulin lispro (humaLOG) injection 0-24 Units 0-24 Units 4 Times Daily With Meals & Nightly 3/3/2019     Sig - Route: Inject 0-24 Units under the skin into the appropriate area as directed 4 (Four) Times a Day With Meals & at Bedtime. - Subcutaneous    insulin regular (humuLIN R,novoLIN R) injection 6 Units 6 Units Once 3/3/2019 3/3/2019    Sig - Route: Infuse 6 Units into a venous catheter 1 (One) Time. - Intravenous    isosorbide mononitrate (IMDUR) 24 hr tablet 30 mg 30 mg Daily 3/4/2019     Sig - Route: Take 1 tablet by mouth Daily. - Oral    lactulose solution 10 g 10 g 3 Times Daily 3/3/2019     Sig - Route: Take 15 mL by mouth 3 (Three) Times a Day. - Oral    methylcellulose (Laxative) (CITRUCEL) tablet 500 mg 1 tablet 2 Times Daily 3/3/2019     Sig - Route: Take 1 tablet by mouth 2 (Two) Times a Day. - Oral    metOLazone (ZAROXOLYN) tablet 2.5 mg 2.5 mg Daily 3/4/2019     Sig - Route: Take 1 tablet by mouth Daily. - Oral    midodrine (PROAMATINE) tablet 10 mg 10 mg 3 Times Daily 3/3/2019     Sig - Route: Take 1 tablet by mouth 3 (Three) Times a Day. - Oral    nebivolol (BYSTOLIC) tablet 5 mg 5 mg Daily 3/3/2019     Sig - Route: Take 1 tablet by mouth Daily. - Oral    ondansetron (ZOFRAN) injection 4 mg 4 mg Every 6 Hours PRN 3/3/2019     Sig - Route: Infuse 2 mL into a venous catheter  Every 6 (Six) Hours As Needed for Nausea or Vomiting. - Intravenous    ondansetron ODT (ZOFRAN-ODT) disintegrating tablet 4 mg 4 mg 4 Times Daily PRN 3/3/2019     Sig - Route: Take 1 tablet by mouth 4 (Four) Times a Day As Needed for Nausea or Vomiting. - Oral    oxyCODONE-acetaminophen (PERCOCET)  MG per tablet 1 tablet 1 tablet Every 6 Hours PRN 3/3/2019     Sig - Route: Take 1 tablet by mouth Every 6 (Six) Hours As Needed for Moderate Pain . - Oral    pantoprazole (PROTONIX) EC tablet 40 mg 40 mg Daily 3/3/2019     Sig - Route: Take 1 tablet by mouth Daily. - Oral    rosuvastatin (CRESTOR) tablet 40 mg 40 mg Daily 3/4/2019     Sig - Route: Take 2 tablets by mouth Daily. - Oral    sodium chloride 0.9 % flush 3 mL 3 mL Every 12 Hours Scheduled 3/3/2019     Sig - Route: Infuse 3 mL into a venous catheter Every 12 (Twelve) Hours. - Intravenous    sodium chloride 0.9 % flush 3-10 mL 3-10 mL As Needed 3/3/2019     Sig - Route: Infuse 3-10 mL into a venous catheter As Needed for Line Care. - Intravenous            Lab Results (last 24 hours)     Procedure Component Value Units Date/Time    Folate [[id number]] Collected:  03/04/19 0531    Specimen:  Blood Updated:  03/04/19 1214    POC Glucose Once [[id number]]  (Abnormal) Collected:  03/04/19 1128    Specimen:  Blood Updated:  03/04/19 1149     Glucose 310 mg/dL      Comment: : [id number] Ratna MeganMeter ID: [id number]       POC Glucose Once [[id number]]  (Abnormal) Collected:  03/04/19 0809    Specimen:  Blood Updated:  03/04/19 0831     Glucose 279 mg/dL      Comment: : [id number] Ratna MeganMeter ID: [id number]       Hemoglobin A1c [[id number]] Collected:  03/04/19 0531    Specimen:  Blood Updated:  03/04/19 0729     Hemoglobin A1C 8.7 %     Narrative:       Less than 6.0           Non-Diabetic Range  6.0-7.0                 ADA Therapeutic Target  Greater than 7.0        Action Suggested    TSH [[id number]]  (Normal) Collected:  03/04/19 0531     Specimen:  Blood Updated:  03/04/19 0705     TSH 0.751 mIU/mL     Comprehensive Metabolic Panel [[id number]]  (Abnormal) Collected:  03/04/19 0531    Specimen:  Blood Updated:  03/04/19 0642     Glucose 279 mg/dL      BUN 17 mg/dL      Creatinine 1.48 mg/dL      Sodium 136 mmol/L      Potassium 4.5 mmol/L      Chloride 98 mmol/L      CO2 29.0 mmol/L      Calcium 9.2 mg/dL      Total Protein 6.4 g/dL      Albumin 4.20 g/dL      ALT (SGPT) 25 U/L      AST (SGOT) 30 U/L      Alkaline Phosphatase 61 U/L      Total Bilirubin 0.8 mg/dL      eGFR Non African Amer 48 mL/min/1.73      Globulin 2.2 gm/dL      A/G Ratio 1.9 g/dL      BUN/Creatinine Ratio 11.5     Anion Gap 9.0 mmol/L     Magnesium [[id number]]  (Normal) Collected:  03/04/19 0531    Specimen:  Blood Updated:  03/04/19 0640     Magnesium 1.7 mg/dL     Phosphorus [[id number]]  (Abnormal) Collected:  03/04/19 0531    Specimen:  Blood Updated:  03/04/19 0637     Phosphorus 4.9 mg/dL     CBC Auto Differential [[id number]]  (Abnormal) Collected:  03/04/19 0531    Specimen:  Blood Updated:  03/04/19 0624     WBC 7.19 10*3/mm3      RBC 4.03 10*6/mm3      Hemoglobin 11.8 g/dL      Hematocrit 35.5 %      MCV 88.1 fL      MCH 29.3 pg      MCHC 33.2 g/dL      RDW 13.7 %      RDW-SD 44.1 fl      MPV 11.6 fL      Platelets 227 10*3/mm3      Neutrophil % 56.4 %      Lymphocyte % 24.2 %      Monocyte % 9.9 %      Eosinophil % 8.5 %      Basophil % 0.7 %      Immature Grans % 0.3 %      Neutrophils, Absolute 4.06 10*3/mm3      Lymphocytes, Absolute 1.74 10*3/mm3      Monocytes, Absolute 0.71 10*3/mm3      Eosinophils, Absolute 0.61 10*3/mm3      Basophils, Absolute 0.05 10*3/mm3      Immature Grans, Absolute 0.02 10*3/mm3      nRBC 0.0 /100 WBC     POC Glucose Once [[id number]]  (Abnormal) Collected:  03/03/19 2028    Specimen:  Blood Updated:  03/03/19 2100     Glucose 365 mg/dL      Comment: : [id number] Hiram Longo ID: [id number]       POC Glucose Once [[id number]]   (Abnormal) Collected:  03/03/19 1924    Specimen:  Blood Updated:  03/03/19 1936     Glucose 361 mg/dL      Comment: : [id number] Mode [place] ID: [id number]       [place] Draw [[id number]] Collected:  03/03/19 1651    Specimen:  Blood Updated:  03/03/19 1800    Narrative:       The following orders were created for panel order [place] Draw.  Procedure                               Abnormality         Status                     ---------                               -----------         ------                     Red Top[[id number]]                                          Final result                 Please view results for these tests on the individual orders.    Red Top [[id number]] Collected:  03/03/19 1651    Specimen:  Blood Updated:  03/03/19 1800     Extra Tube Hold for add-ons.     Comment: Auto resulted.       Urine Drug Screen - Urine, Clean Catch [[id number]]  (Normal) Collected:  03/03/19 1711    Specimen:  Urine, Clean Catch Updated:  03/03/19 1737     Amphetamine Screen, Urine Negative     Barbiturates Screen, Urine Negative     Benzodiazepine Screen, Urine Negative     Cocaine Screen, Urine Negative     Methadone Screen, Urine Negative     Opiate Screen Negative     Phencyclidine (PCP), Urine Negative     THC, Screen, Urine Negative    Narrative:       Negative Thresholds For Drugs Screened in Urine:    Amphetamines          500 ng/ml  Barbiturates          200 ng/ml  Benzodiazepines       200 ng/ml  Cocaine               150 ng/ml  Methadone             150 ng/ml  Opiates               300 ng/ml  Phencyclidine         25 ng/ml  THC                      50 ng/ml    The normal value for all drugs tested is negative. This report includes final unconfirmed screening results.  A positive result by this assay can be, at your request, sent to the Reference Lab for confirmation by gas chromatography. Unconfirmed results must not be used for non-medical purposes, such as employment or legal testing.  Clinical consideration should be applied to any drug of abuse test result, particularly when unconfirmed results are used.    aPTT [[id number]]  (Normal) Collected:  03/03/19 1651    Specimen:  Blood Updated:  03/03/19 1719     PTT 26.3 seconds     Protime-INR [[id number]]  (Normal) Collected:  03/03/19 1651    Specimen:  Blood Updated:  03/03/19 1719     Protime 13.1 Seconds      INR 0.96    Comprehensive Metabolic Panel [[id number]]  (Abnormal) Collected:  03/03/19 1651    Specimen:  Blood Updated:  03/03/19 1717     Glucose 496 mg/dL      BUN 19 mg/dL      Creatinine 1.07 mg/dL      Sodium 131 mmol/L      Potassium 4.8 mmol/L      Chloride 96 mmol/L      CO2 26.0 mmol/L      Calcium 9.8 mg/dL      Total Protein 6.8 g/dL      Albumin 4.50 g/dL      ALT (SGPT) 20 U/L      AST (SGOT) 24 U/L      Alkaline Phosphatase 66 U/L      Total Bilirubin 0.6 mg/dL      eGFR Non African Amer 70 mL/min/1.73      Globulin 2.3 gm/dL      A/G Ratio 2.0 g/dL      BUN/Creatinine Ratio 17.8     Anion Gap 9.0 mmol/L     Lactic Acid, Plasma [[id number]]  (Normal) Collected:  03/03/19 1651    Specimen:  Blood Updated:  03/03/19 1710     Lactate 1.7 mmol/L     Ammonia [[id number]]  (Abnormal) Collected:  03/03/19 1651    Specimen:  Blood Updated:  03/03/19 1710     Ammonia <9 umol/L     CBC & Differential [[id number]] Collected:  03/03/19 1651    Specimen:  Blood Updated:  03/03/19 1702    Narrative:       The following orders were created for panel order CBC & Differential.  Procedure                               Abnormality         Status                     ---------                               -----------         ------                     CBC Auto Differential[[id number]]        Abnormal            Final result                 Please view results for these tests on the individual orders.    CBC Auto Differential [[id number]]  (Abnormal) Collected:  03/03/19 1651    Specimen:  Blood Updated:  03/03/19 1702     WBC 9.74 10*3/mm3      RBC  3.96 10*6/mm3      Hemoglobin 11.8 g/dL      Hematocrit 33.9 %      MCV 85.6 fL      MCH 29.8 pg      MCHC 34.8 g/dL      RDW 13.4 %      RDW-SD 41.9 fl      MPV 11.4 fL      Platelets 248 10*3/mm3      Neutrophil % 70.6 %      Lymphocyte % 15.8 %      Monocyte % 8.5 %      Eosinophil % 4.3 %      Basophil % 0.5 %      Immature Grans % 0.3 %      Neutrophils, Absolute 6.87 10*3/mm3      Lymphocytes, Absolute 1.54 10*3/mm3      Monocytes, Absolute 0.83 10*3/mm3      Eosinophils, Absolute 0.42 10*3/mm3      Basophils, Absolute 0.05 10*3/mm3      Immature Grans, Absolute 0.03 10*3/mm3      nRBC 0.0 /100 WBC     Blood Gas, Arterial [[id number]] Collected:  03/03/19 1655    Specimen:  Arterial Blood Updated:  03/03/19 1701     Site Right Radial     Dae's Test Positive     pH, Arterial 7.427 pH units      pCO2, Arterial 38.6 mm Hg      pO2, Arterial 84.0 mm Hg      HCO3, Arterial 25.4 mmol/L      Base Excess, Arterial 1.1 mmol/L      O2 Saturation, Arterial 97.5 %      Temperature 37.0 C      Barometric Pressure for Blood Gas 754 mmHg      Modality Room Air     Ventilator Mode NA     Collected by [id number]     Comment: Meter: [id number]     :  [id number]           Imaging Results (last 24 hours)     Procedure Component Value Units Date/Time    CT Head Without Contrast [[id number]] Collected:  03/03/19 1646     Updated:  03/03/19 1650    Narrative:       EXAMINATION: CT HEAD WO CONTRAST-      3/3/2019 4:34 PM CST     HISTORY: Confusion/delirium, altered LOC, unexplained     In order to have a CT radiation dose as low as reasonably achievable  Automated Exposure Control was utilized for adjustment of the mA and/or  KV according to patient size.     DLP in mGycm= 1411 (rescan due to motion).     Noncontrast head CT compared with 2/7/2019.     Axial, sagittal, and coronal noncontrast CT imaging of the head.     The visualized paranasal sinuses are clear.     The brain and ventricles have an age appropriate  appearance.   There is no hemorrhage or mass-effect.   No acute infarction is seen.     No calvarial abnormality.       Impression:       1. No acute intracranial abnormality is seen.                                         This report was finalized on 03/03/2019 16:47 by Dr. Gomez Mcgill MD.        ECG/EMG Results (last 24 hours)     ** No results found for the last 24 hours. **        Orders (last 24 hrs)     Start     Ordered    03/05/19 0600  Vitamin B12  Morning Draw      03/04/19 1149    03/04/19 1214  Folate  Once      03/04/19 1149    03/04/19 1150  POC Glucose Once  Once      03/04/19 1128    03/04/19 1150  EEG  Once      03/04/19 1149    03/04/19 0941  Inpatient Case Management  Consult  Once     Provider:  (Not yet assigned)    03/04/19 0940    03/04/19 0900  metOLazone (ZAROXOLYN) tablet 2.5 mg  Daily      03/03/19 1936 03/04/19 0900  aspirin EC tablet 81 mg  Daily      03/03/19 1936 03/04/19 0900  allopurinol (ZYLOPRIM) tablet 100 mg  Daily      03/03/19 1936 03/04/19 0900  isosorbide mononitrate (IMDUR) 24 hr tablet 30 mg  Daily      03/03/19 1936 03/04/19 0900  rosuvastatin (CRESTOR) tablet 40 mg  Daily      03/03/19 1936 03/04/19 0900  escitalopram (LEXAPRO) tablet 10 mg  Daily      03/03/19 1936 03/04/19 0900  bumetanide (BUMEX) tablet 2 mg  Daily      03/03/19 1936 03/04/19 0900  DULoxetine (CYMBALTA) DR capsule 60 mg  Daily      03/03/19 1936 03/04/19 0832  POC Glucose Once  Once      03/04/19 0809    03/04/19 0600  CBC Auto Differential  Morning Draw      03/03/19 1936 03/04/19 0600  Comprehensive Metabolic Panel  Morning Draw      03/03/19 1936 03/04/19 0600  Hemoglobin A1c  Morning Draw      03/03/19 1936 03/04/19 0600  TSH  Morning Draw      03/03/19 1936 03/04/19 0600  Magnesium  Morning Draw      03/03/19 1936 03/04/19 0600  Phosphorus  Morning Draw      03/03/19 1936 03/03/19 2200  POC Glucose 4x Daily AC & at Bedtime  4 Times Daily  Before Meals & at Bedtime      03/03/19 1936 03/03/19 2101  POC Glucose Once  Once      03/03/19 2028 03/03/19 2100  amitriptyline (ELAVIL) tablet 50 mg  Nightly      03/03/19 1936 03/03/19 2100  midodrine (PROAMATINE) tablet 10 mg  3 Times Daily      03/03/19 1936 03/03/19 2100  lactulose solution 10 g  3 Times Daily      03/03/19 1936 03/03/19 2100  methylcellulose (Laxative) (CITRUCEL) tablet 500 mg  2 Times Daily      03/03/19 1936 03/03/19 2100  sodium chloride 0.9 % flush 3 mL  Every 12 Hours Scheduled      03/03/19 1936 03/03/19 2100  insulin lispro (humaLOG) injection 0-24 Units  4 Times Daily With Meals & Nightly      03/03/19 1936 03/03/19 2030  pantoprazole (PROTONIX) EC tablet 40 mg  Daily      03/03/19 1936 03/03/19 2030  nebivolol (BYSTOLIC) tablet 5 mg  Daily      03/03/19 1936 03/03/19 2000  Vital Signs  Every 4 Hours      03/03/19 1936 03/03/19 2000  Neuro Checks  Every 4 Hours      03/03/19 1936 03/03/19 1937  Intake & Output  Every Shift      03/03/19 1936 03/03/19 1937  Do NOT Hold Basal Insulin When Patient is NPO, Hold Bolus Dose if NPO  Continuous      03/03/19 1936 03/03/19 1937  Follow [facility] Hypoglycemia Standing Orders For Blood Glucose Less Than 70 mg/dL  Until Discontinued      03/03/19 1936 03/03/19 1937  Hypoglycemia Treatment - Alert Patient That is Not NPO and Can Safely Swallow  Until Discontinued     Comments:  Administer 4 oz Fruit Juice OR 4 oz Regular Soda OR 8 oz Milk OR 15-30 grams (1 tube) of Glucose Gel.  Recheck Blood Glucose 15 Minutes After Ingestion, Repeat Treatment & Continue to Recheck Blood Sugar Every 15 Minutes Until Blood Glucose is 70 mg/dL or Higher.  Once Blood Glucose is 70 mg/dL or Higher and if It Will Be more than 60 Minutes Until the Next Meal, Provide Appropriate Snack (Including Carbohydrate Food) Based on Meal Plan Order. Give Meal Tray As Soon As Possible.    03/03/19 1936 03/03/19 1937  Hypoglycemia  Treatment - Patient Has IV Access - Unresponsive, NPO or Unable To Safely Swallow  Until Discontinued     Comments:  Administer 25g (50ml) D50W IV Push.  Recheck Blood Glucose 15 Minutes After Administration, if Blood Glucose Remains Less Than 70 mg/dl, Repeat Treatment   Recheck Blood Glucose 15 Minutes After 2nd Administration, if Blood Glucose Remains Less Than 70 mg/dL After 2nd Dose of D50W, Contact Provider for Further Treatment Orders & Consider Adding IVF With D5W for Maintenance    03/03/19 1936 03/03/19 1937  Hypoglycemia Treatment - Patient Without IV Access - Unresponsive, NPO or Unable To Safely Swallow  Until Discontinued     Comments:  Administer 1mg Glucagon SQ & Establish IV Access.  Turn Patient on Side - Nausea / Vomiting May Occur.  Recheck Blood Glucose 15 Minutes After Administration.  If Blood Glucose Remains Less Than 70, Administer 25g D50W IV Push (50ml).  Recheck Blood Glucose 15 Minutes After Administration of D50W, if Blood Glucose Remains Less Than 70 mg/dl, Contact Provider for Further Treatment Orders & Consider Adding IVF With D5 for Maintenance    03/03/19 1936 03/03/19 1937  Notify Provider of event. Communicate needs and document in EMR.  Once      03/03/19 1936 03/03/19 1937  Document event and patients response to treatment in EMR, any medications given to be documented on MAR.  Once      03/03/19 1936 03/03/19 1937  Oxygen Therapy- Nasal Cannula; Titrate for SPO2: 90%  Continuous,   Status:  Canceled      03/03/19 1936 03/03/19 1937  Insert Peripheral IV  Once      03/03/19 1936 03/03/19 1937  Saline Lock & Maintain IV Access  Continuous      03/03/19 1936 03/03/19 1937  Place Sequential Compression Device  Once      03/03/19 1936 03/03/19 1937  Maintain Sequential Compression Device  Continuous      03/03/19 1936 03/03/19 1937  Cardiac Monitoring  Continuous      03/03/19 1936 03/03/19 1937  Activity - Ad Linda  Until Discontinued      03/03/19 1936     03/03/19 1937  Daily Weights  Daily      03/03/19 1936 03/03/19 1937  Diet Regular; Consistent Carbohydrate  Diet Effective Now      03/03/19 1936    03/03/19 1937  Inpatient Neurology Consult General  Once     Specialty:  Neurology  Provider:  Steven Aviles MD    03/03/19 1936 03/03/19 1937  Please ascertain the patients correct dose of insulin daily and place in computer and call attending doctor on to resume.  Nursing Communication  Once     Comments:  Please ascertain the patients correct dose of insulin daily and place in computer and call attending doctor on to resume.    03/03/19 1936 03/03/19 1937  POC Glucose Once  Once      03/03/19 1924 03/03/19 1936  ondansetron ODT (ZOFRAN-ODT) disintegrating tablet 4 mg  4 Times Daily PRN      03/03/19 1936    03/03/19 1936  oxyCODONE-acetaminophen (PERCOCET)  MG per tablet 1 tablet  Every 6 Hours PRN      03/03/19 1936    03/03/19 1936  colchicine tablet 0.6 mg  2 Times Daily PRN      03/03/19 1936    03/03/19 1936  dextrose (GLUTOSE) oral gel 15 g  Every 15 Minutes PRN      03/03/19 1936    03/03/19 1936  dextrose (D50W) 25 g/ 50mL Intravenous Solution 25 g  Every 15 Minutes PRN      03/03/19 1936    03/03/19 1936  glucagon (human recombinant) (GLUCAGEN DIAGNOSTIC) injection 1 mg  As Needed      03/03/19 1936 03/03/19 1936  sodium chloride 0.9 % flush 3-10 mL  As Needed      03/03/19 1936    03/03/19 1936  acetaminophen (TYLENOL) tablet 650 mg  Every 4 Hours PRN      03/03/19 1936    03/03/19 1936  ondansetron (ZOFRAN) injection 4 mg  Every 6 Hours PRN      03/03/19 1936    03/03/19 1807  Code Status and Medical Interventions:  Continuous      03/03/19 1810    03/03/19 1742  insulin regular (humuLIN R,novoLIN R) injection 6 Units  Once      03/03/19 1740    03/03/19 1742  Initiate Observation Status  Once      03/03/19 1742    03/03/19 1702  Blood Gas, Arterial  Once      03/03/19 1655    03/03/19 1700  Ammonia  STAT      03/03/19 1622     "03/03/19 1653  Respiratory Therapy to Obtain Blood Via Arterial Puncture  Once      03/03/19 1652    03/03/19 1652  [place] Draw  Once      03/03/19 1651    03/03/19 1652  Red Top  PROCEDURE ONCE      03/03/19 1651    03/03/19 1622  CBC & Differential  Once      03/03/19 1622    03/03/19 1622  Comprehensive Metabolic Panel  Once      03/03/19 1622    03/03/19 1622  aPTT  Once      03/03/19 1622    03/03/19 1622  Protime-INR  Once      03/03/19 1622    03/03/19 1622  Lactic Acid, Plasma  Once      03/03/19 1622    03/03/19 1622  Urine Drug Screen - Urine, Clean Catch  Once      03/03/19 1622    03/03/19 1622  Blood Gas, Arterial  Once      03/03/19 1622    03/03/19 1622  CT Head Without Contrast  1 Time Imaging      03/03/19 1622    03/03/19 1622  CBC Auto Differential  PROCEDURE ONCE      03/03/19 1622    --  SCANNED - TELEMETRY        03/03/19 0000          Physician Progress Notes (last 24 hours) (Notes from 3/3/2019 12:26 PM through 3/4/2019 12:26 PM)     No notes of this type exist for this encounter.           Consult Notes (last 24 hours) (Notes from 3/3/2019 12:26 PM through 3/4/2019 12:26 PM)      Steven Aviles MD at 3/4/2019 11:09 AM          Neurology Consult Note    Referring Provider: Dr. MARY Rodriguez  Reason for Consultation:        History of present illness:      63 year old males who 2 nights ago became confused per his wife who I spoke with over the phone.  Yesterday morning he fell forward without hitting his head.  He became more confused.  He had visual and auditory hallucinations.  As an example, he saw his young son in his closet.  He saw mops in the bathroom that were not there.  He also endorses RUQ abdominal pain.  This morning his wife tells me he did not know his middle name.  Currently his mental status has improved somewhat but he does believe the clock is \"moving.\"  He told his wife earlier the \"trashcan is moving.\"      He was recently hospitalized and discharged on 2/13 after being " "found to have air in his hepatic system.  The cause of this was not found.  His blood glucose was 496 on admission.  His sodium was 131.  Ammonia was normal.  ABG showed CO2 of 38.6.  Urine drug screen was negative.  HbA1c:  8.7.  TSH normal.    He has also recently been diagnosed with an autonomic neuropathy causing hypotension.        Past Medical History:   Diagnosis Date   • Arthritis    • CHF (congestive heart failure) (CMS/[facility])    • Coronary artery disease    • Diabetes mellitus (CMS/[facility])    • Hyperlipidemia    • Hypertension    • Myocardial infarction (CMS/[facility])    • Pancreatitis    • Renal disorder    • Sleep apnea with use of continuous positive airway pressure (CPAP)        Allergies   Allergen Reactions   • Bactrim [Sulfamethoxazole-Trimethoprim] Other (See Comments)     \"RENAL FAILURE\"     No current facility-administered medications on file prior to encounter.      Current Outpatient Medications on File Prior to Encounter   Medication Sig   • allopurinol (ZYLOPRIM) 100 MG tablet Take 100 mg by mouth Daily.   • amitriptyline (ELAVIL) 50 MG tablet Take 50 mg by mouth Every Night.   • aspirin 81 MG EC tablet Take 81 mg by mouth Daily.   • bumetanide (BUMEX) 2 MG tablet Take 2 mg by mouth Daily.   • CITRUCEL oral powder Take 2 application by mouth Daily for 30 doses.   • colchicine 0.6 MG tablet Take 0.6 mg by mouth 2 (Two) Times a Day As Needed for Muscle / Joint Pain.   • DULoxetine (CYMBALTA) 60 MG capsule Take 60 mg by mouth Daily.   • escitalopram (LEXAPRO) 10 MG tablet Take 10 mg by mouth Daily.   • insulin regular (humuLIN R) 500 UNIT/ML CONCENTRATED injection Inject 100 Units under the skin 3 (Three) Times a Day Before Meals. Packaging states 100 units with regular meals; 120 units with large meals or 360 units daily   • isosorbide mononitrate (IMDUR) 30 MG 24 hr tablet Take 30 mg by mouth Daily.   • lactulose (CEPHULAC) 10 g packet Take 1 packet by mouth 3 (Three) Times a Day.   • linaclotide " (LINZESS) 290 MCG capsule capsule Take 290 mcg by mouth Every Morning Before Breakfast.   • metOLazone (ZAROXOLYN) 2.5 MG tablet Take 2.5 mg by mouth Daily.   • midodrine (PROAMATINE) 5 MG tablet Take 10 mg by mouth 3 (Three) Times a Day.   • nebivolol (BYSTOLIC) 5 MG tablet Take 5 mg by mouth Daily.   • ondansetron ODT (ZOFRAN-ODT) 4 MG disintegrating tablet Take 4 mg by mouth 4 (Four) Times a Day As Needed for Nausea or Vomiting.   • oxyCODONE-acetaminophen (PERCOCET)  MG per tablet Take 1 tablet by mouth Every 6 (Six) Hours As Needed for Moderate Pain .   • pantoprazole (PROTONIX) 40 MG EC tablet Take 1 tablet by mouth Daily.   • rosuvastatin (CRESTOR) 40 MG tablet Take 40 mg by mouth Daily.       Social History     Socioeconomic History   • Marital status:      Spouse name: Not on file   • Number of children: Not on file   • Years of education: Not on file   • Highest education level: Not on file   Social Needs   • Financial resource strain: Not on file   • Food insecurity - worry: Not on file   • Food insecurity - inability: Not on file   • Transportation needs - medical: Not on file   • Transportation needs - non-medical: Not on file   Occupational History   • Not on file   Tobacco Use   • Smoking status: Never Smoker   • Smokeless tobacco: Never Used   • Tobacco comment: smoked in highschool   Substance and Sexual Activity   • Alcohol use: No   • Drug use: No   • Sexual activity: Not on file   Other Topics Concern   • Not on file   Social History Narrative   • Not on file     Family History   Problem Relation Age of Onset   • Colon cancer Father    • Heart disease Father    • Colon cancer Sister    • Colon polyps Sister    • Alzheimer's disease Mother    • Coronary artery disease Sister    • Coronary artery disease Sister        Review of Systems  A 14 point review of systems was reviewed and was negative except for confusion    Vital Signs   Temp:  [97 °F (36.1 °C)-99.4 °F (37.4 °C)] 98 °F (36.7  °C)  Heart Rate:  [68-93] 73  Resp:  [17-22] 18  BP: (122-157)/(51-88) 150/74    General Exam:  Head:  Normal cephalic, atraumatic  HEENT:  Neck supple  Fundoscopic Exam:  No signs of disc edema  CVS:  Regular rate and rhythm.  No murmurs  Carotid Examination:  No bruits  Lungs:  Clear to auscultation  Abdomen:  Non-tender, Non-distended  Extremities:  No signs of peripheral edema  Skin:  No rashes    Neurologic Exam:    Mental Status:    -Awake, Alert, Oriented X 3  -No word finding difficulties  -No aphasia  -No dysarthria  -Follows simple and complex commands    CN II:  Visual fields full.  Pupils equally reactive to light  CN III, IV, VI:  Extraocular Muscles full with no signs of nystagmus  CN V:  Facial sensory is symmetric with no asymmetries.  CN VII:  Facial motor symmetric  CN VIII:  Gross hearing intact bilaterally  CN IX:  Palate elevates symmetrically  CN X:  Palate elevates symmetrically  CN XI:  Shoulder shrug symmetric  CN XII:  Tongue is midline on protrusion    Motor: (strength out of 5:  1= minimal movement, 2 = movement in plane of gravity, 3 = movement against gravity, 4 = movement against some resistance, 5 = full strength)    -Right Upper Ext: Proximal: 5 Distal: 5  -Left Upper Ext: Proximal: 5 Distal: 5    -Right Lower Ext: Proximal: 5 Distal: 5  -Left Lower Ext: Proximal: 5 Distal: 5    DTR:  -Right   Bicep: 2+ Tricep: 2+ Brachoradialis: 2+   Patella: 2+ Ankle: 2+ Neg Babinski  -Left   Bicep: 2+ Tricep: 2+ Brachoradialis: 2+   Patella: 2+ Ankle: 2+ Neg Babinski    Sensory:  -Intact to light touch, pinprick, temperature, pain, and proprioception    Coordination:  -Finger to nose intact  -Heel to shin intact  -No ataxia    Gait  -No signs of ataxia  -ambulates unassisted      Results Review:  Lab Results (last 24 hours)     Procedure Component Value Units Date/Time    POC Glucose Once [[id number]]  (Abnormal) Collected:  03/04/19 0809    Specimen:  Blood Updated:  03/04/19 0831     Glucose 279  mg/dL      Comment: : [id number] Ratna GarcianMeter ID: [id number]       Hemoglobin A1c [[id number]] Collected:  03/04/19 0531    Specimen:  Blood Updated:  03/04/19 0729     Hemoglobin A1C 8.7 %     Narrative:       Less than 6.0           Non-Diabetic Range  6.0-7.0                 ADA Therapeutic Target  Greater than 7.0        Action Suggested    TSH [[id number]]  (Normal) Collected:  03/04/19 0531    Specimen:  Blood Updated:  03/04/19 0705     TSH 0.751 mIU/mL     Comprehensive Metabolic Panel [[id number]]  (Abnormal) Collected:  03/04/19 0531    Specimen:  Blood Updated:  03/04/19 0642     Glucose 279 mg/dL      BUN 17 mg/dL      Creatinine 1.48 mg/dL      Sodium 136 mmol/L      Potassium 4.5 mmol/L      Chloride 98 mmol/L      CO2 29.0 mmol/L      Calcium 9.2 mg/dL      Total Protein 6.4 g/dL      Albumin 4.20 g/dL      ALT (SGPT) 25 U/L      AST (SGOT) 30 U/L      Alkaline Phosphatase 61 U/L      Total Bilirubin 0.8 mg/dL      eGFR Non African Amer 48 mL/min/1.73      Globulin 2.2 gm/dL      A/G Ratio 1.9 g/dL      BUN/Creatinine Ratio 11.5     Anion Gap 9.0 mmol/L     Magnesium [[id number]]  (Normal) Collected:  03/04/19 0531    Specimen:  Blood Updated:  03/04/19 0640     Magnesium 1.7 mg/dL     Phosphorus [[id number]]  (Abnormal) Collected:  03/04/19 0531    Specimen:  Blood Updated:  03/04/19 0637     Phosphorus 4.9 mg/dL     CBC Auto Differential [[id number]]  (Abnormal) Collected:  03/04/19 0531    Specimen:  Blood Updated:  03/04/19 0624     WBC 7.19 10*3/mm3      RBC 4.03 10*6/mm3      Hemoglobin 11.8 g/dL      Hematocrit 35.5 %      MCV 88.1 fL      MCH 29.3 pg      MCHC 33.2 g/dL      RDW 13.7 %      RDW-SD 44.1 fl      MPV 11.6 fL      Platelets 227 10*3/mm3      Neutrophil % 56.4 %      Lymphocyte % 24.2 %      Monocyte % 9.9 %      Eosinophil % 8.5 %      Basophil % 0.7 %      Immature Grans % 0.3 %      Neutrophils, Absolute 4.06 10*3/mm3      Lymphocytes, Absolute 1.74 10*3/mm3      Monocytes,  Absolute 0.71 10*3/mm3      Eosinophils, Absolute 0.61 10*3/mm3      Basophils, Absolute 0.05 10*3/mm3      Immature Grans, Absolute 0.02 10*3/mm3      nRBC 0.0 /100 WBC     POC Glucose Once [[id number]]  (Abnormal) Collected:  03/03/19 2028    Specimen:  Blood Updated:  03/03/19 2100     Glucose 365 mg/dL      Comment: : [id number] Hiram Longo ID: [id number]       POC Glucose Once [[id number]]  (Abnormal) Collected:  03/03/19 1924    Specimen:  Blood Updated:  03/03/19 1936     Glucose 361 mg/dL      Comment: : [id number] Mode [place] ID: [id number]       [facility] [[id number]] Collected:  03/03/19 1651    Specimen:  Blood Updated:  03/03/19 1800    Narrative:       The following orders were created for panel order [facility].  Procedure                               Abnormality         Status                     ---------                               -----------         ------                     Red Top[[id number]]                                          Final result                 Please view results for these tests on the individual orders.    Red Top [[id number]] Collected:  03/03/19 1651    Specimen:  Blood Updated:  03/03/19 1800     Extra Tube Hold for add-ons.     Comment: Auto resulted.       Urine Drug Screen - Urine, Clean Catch [[id number]]  (Normal) Collected:  03/03/19 1711    Specimen:  Urine, Clean Catch Updated:  03/03/19 1737     Amphetamine Screen, Urine Negative     Barbiturates Screen, Urine Negative     Benzodiazepine Screen, Urine Negative     Cocaine Screen, Urine Negative     Methadone Screen, Urine Negative     Opiate Screen Negative     Phencyclidine (PCP), Urine Negative     THC, Screen, Urine Negative    Narrative:       Negative Thresholds For Drugs Screened in Urine:    Amphetamines          500 ng/ml  Barbiturates          200 ng/ml  Benzodiazepines       200 ng/ml  Cocaine               150 ng/ml  Methadone             150 ng/ml  Opiates               300  ng/ml  Phencyclidine         25 ng/ml  THC                      50 ng/ml    The normal value for all drugs tested is negative. This report includes final unconfirmed screening results.  A positive result by this assay can be, at your request, sent to the Reference Lab for confirmation by gas chromatography. Unconfirmed results must not be used for non-medical purposes, such as employment or legal testing. Clinical consideration should be applied to any drug of abuse test result, particularly when unconfirmed results are used.    aPTT [[id number]]  (Normal) Collected:  03/03/19 1651    Specimen:  Blood Updated:  03/03/19 1719     PTT 26.3 seconds     Protime-INR [[id number]]  (Normal) Collected:  03/03/19 1651    Specimen:  Blood Updated:  03/03/19 1719     Protime 13.1 Seconds      INR 0.96    Comprehensive Metabolic Panel [[id number]]  (Abnormal) Collected:  03/03/19 1651    Specimen:  Blood Updated:  03/03/19 1717     Glucose 496 mg/dL      BUN 19 mg/dL      Creatinine 1.07 mg/dL      Sodium 131 mmol/L      Potassium 4.8 mmol/L      Chloride 96 mmol/L      CO2 26.0 mmol/L      Calcium 9.8 mg/dL      Total Protein 6.8 g/dL      Albumin 4.50 g/dL      ALT (SGPT) 20 U/L      AST (SGOT) 24 U/L      Alkaline Phosphatase 66 U/L      Total Bilirubin 0.6 mg/dL      eGFR Non African Amer 70 mL/min/1.73      Globulin 2.3 gm/dL      A/G Ratio 2.0 g/dL      BUN/Creatinine Ratio 17.8     Anion Gap 9.0 mmol/L     Lactic Acid, Plasma [[id number]]  (Normal) Collected:  03/03/19 1651    Specimen:  Blood Updated:  03/03/19 1710     Lactate 1.7 mmol/L     Ammonia [[id number]]  (Abnormal) Collected:  03/03/19 1651    Specimen:  Blood Updated:  03/03/19 1710     Ammonia <9 umol/L     CBC & Differential [[id number]] Collected:  03/03/19 1651    Specimen:  Blood Updated:  03/03/19 1702    Narrative:       The following orders were created for panel order CBC & Differential.  Procedure                               Abnormality         Status                      ---------                               -----------         ------                     CBC Auto Differential[[id number]]        Abnormal            Final result                 Please view results for these tests on the individual orders.    CBC Auto Differential [[id number]]  (Abnormal) Collected:  03/03/19 1651    Specimen:  Blood Updated:  03/03/19 1702     WBC 9.74 10*3/mm3      RBC 3.96 10*6/mm3      Hemoglobin 11.8 g/dL      Hematocrit 33.9 %      MCV 85.6 fL      MCH 29.8 pg      MCHC 34.8 g/dL      RDW 13.4 %      RDW-SD 41.9 fl      MPV 11.4 fL      Platelets 248 10*3/mm3      Neutrophil % 70.6 %      Lymphocyte % 15.8 %      Monocyte % 8.5 %      Eosinophil % 4.3 %      Basophil % 0.5 %      Immature Grans % 0.3 %      Neutrophils, Absolute 6.87 10*3/mm3      Lymphocytes, Absolute 1.54 10*3/mm3      Monocytes, Absolute 0.83 10*3/mm3      Eosinophils, Absolute 0.42 10*3/mm3      Basophils, Absolute 0.05 10*3/mm3      Immature Grans, Absolute 0.03 10*3/mm3      nRBC 0.0 /100 WBC     Blood Gas, Arterial [[id number]] Collected:  03/03/19 1655    Specimen:  Arterial Blood Updated:  03/03/19 1701     Site Right Radial     Dae's Test Positive     pH, Arterial 7.427 pH units      pCO2, Arterial 38.6 mm Hg      pO2, Arterial 84.0 mm Hg      HCO3, Arterial 25.4 mmol/L      Base Excess, Arterial 1.1 mmol/L      O2 Saturation, Arterial 97.5 %      Temperature 37.0 C      Barometric Pressure for Blood Gas 754 mmHg      Modality Room Air     Ventilator Mode NA     Collected by [id number]     Comment: Meter: [id number]     :  [id number]             .  Imaging Results (last 24 hours)     Procedure Component Value Units Date/Time    CT Head Without Contrast [[id number]] Collected:  03/03/19 1646     Updated:  03/03/19 1650    Narrative:       EXAMINATION: CT HEAD WO CONTRAST-      3/3/2019 4:34 PM CST     HISTORY: Confusion/delirium, altered LOC, unexplained     In order to have a CT radiation dose  as low as reasonably achievable  Automated Exposure Control was utilized for adjustment of the mA and/or  KV according to patient size.     DLP in mGycm= 1411 (rescan due to motion).     Noncontrast head CT compared with 2/7/2019.     Axial, sagittal, and coronal noncontrast CT imaging of the head.     The visualized paranasal sinuses are clear.     The brain and ventricles have an age appropriate appearance.   There is no hemorrhage or mass-effect.   No acute infarction is seen.     No calvarial abnormality.       Impression:       1. No acute intracranial abnormality is seen.                                         This report was finalized on 03/03/2019 16:47 by Dr. Gomez Mcgill MD.          CT Head without contrast reviewed by me:  No acute findings.    Impression    Metabolic encephalopathy likely from hyperglycemia  Acute medical delirium  Autonomic neuropathy    Plan    · EEG  · B12  · Folate  · Strive for normal glycemic levels  · May consider Northera as an outpatient    I discussed the patients findings and my recommendations with patient and family    Steven Aviles MD  03/04/19  11:12 AM        Electronically signed by Steven Aviles MD at 3/4/2019 11:48 AM

## 2019-03-04 NOTE — PLAN OF CARE
Problem: Patient Care Overview  Goal: Plan of Care Review  Outcome: Ongoing (interventions implemented as appropriate)   03/04/19 0920   Coping/Psychosocial   Plan of Care Reviewed With patient;spouse   Plan of Care Review   Progress no change   OTHER   Outcome Summary CT abd/pelvis, EEG, labs ordered. Results pending. Safety maintained. home meds addressed. Patient A&O X 4, no visual disturbances.     Goal: Individualization and Mutuality  Outcome: Ongoing (interventions implemented as appropriate)    Goal: Discharge Needs Assessment  Outcome: Ongoing (interventions implemented as appropriate)    Goal: Interprofessional Rounds/Family Conf  Outcome: Ongoing (interventions implemented as appropriate)      Problem: Fall Risk (Adult)  Goal: Identify Related Risk Factors and Signs and Symptoms  Outcome: Ongoing (interventions implemented as appropriate)    Goal: Absence of Fall  Outcome: Ongoing (interventions implemented as appropriate)      Problem: Confusion, Acute (Adult)  Goal: Identify Related Risk Factors and Signs and Symptoms  Outcome: Outcome(s) achieved Date Met: 03/04/19    Goal: Cognitive/Functional Impairments Minimized  Outcome: Ongoing (interventions implemented as appropriate)    Goal: Safety  Outcome: Ongoing (interventions implemented as appropriate)

## 2019-03-04 NOTE — H&P
[facility] Medicine Services  HISTORY AND PHYSICAL    Date of Admission: 3/3/2019  Primary Care Physician: Del Shetty MD    Subjective     Chief Complaint: confusion    History of Present Illness  Per wife onset of confusion last PM.  Talking to the dead.  Mistook wife for sister.  Did slowly improve and was felt to resolve but was restless all night getting up and down.  He go up this AM and had similar confusion.  Did have a falling out episode while urinating but per wife this is not uncommon due to an autonomic disturbance he has while urinating.   He sees Dr Lomas for this.  Mental status waxed and waned all day and finally she brought him to the ER.  Has not seen neurology for symptoms.     Review of Systems     Otherwise complete ROS reviewed and negative except as mentioned in the HPI.    Past Medical History:   Past Medical History:   Diagnosis Date   • Arthritis    • CHF (congestive heart failure) (CMS/HCC)    • Coronary artery disease    • Diabetes mellitus (CMS/HCC)    • Hyperlipidemia    • Hypertension    • Myocardial infarction (CMS/HCC)    • Pancreatitis    • Renal disorder    • Sleep apnea with use of continuous positive airway pressure (CPAP)    Autonomic dysfunction  Orthostatic blood pressure  Recent air in gut    Past Surgical History:  Past Surgical History:   Procedure Laterality Date   • ABDOMINAL SURGERY     • APPENDECTOMY     • BACK SURGERY     • CARDIAC SURGERY     • CATARACT EXTRACTION     • CERVICAL SPINE SURGERY     • COLONOSCOPY N/A 1/31/2017    Normal exam repeat in 5 years   • COLONOSCOPY N/A 2/11/2019    Procedure: COLONOSCOPY WITH ANESTHESIA;  Surgeon: Tyrell Smith MD;  Location: [facility] ENDOSCOPY;  Service: Gastroenterology   • COLONOSCOPY W/ POLYPECTOMY  03/04/2014    Hyperplastic polyp   • CORONARY ARTERY BYPASS GRAFT  10/2015   • ENDOSCOPY  04/13/2011    Gastritis with hemorrhage   • ENDOSCOPY N/A 5/5/2017    Normal exam   •  "ENDOSCOPY N/A 2/11/2019    Procedure: ESOPHAGOGASTRODUODENOSCOPY WITH ANESTHESIA;  Surgeon: Tyrell Smith MD;  Location: [facility] ENDOSCOPY;  Service: Gastroenterology   • INCISION AND DRAINAGE OF WOUND Left 09/2007    spider bite     Social History:     Retired  No alcohol, drugs, tobacco  DPOA use nok wife  Living will none  Code status full  PCP Diogenes SARABIA [facility]  Neph Ali    Family History: family history includes Alzheimer's disease in his mother; Colon cancer in his father and sister; Colon polyps in his sister; Coronary artery disease in his sister and sister; Heart disease in his father.       Allergies:  Allergies   Allergen Reactions   • Bactrim [Sulfamethoxazole-Trimethoprim] Other (See Comments)     \"RENAL FAILURE\"     Medications:  Prior to Admission medications    Medication Sig Start Date End Date Taking? Authorizing Provider   allopurinol (ZYLOPRIM) 100 MG tablet Take 100 mg by mouth Daily.    Bossman Blount MD   amitriptyline (ELAVIL) 50 MG tablet Take 50 mg by mouth Every Night.    Bossman Blount MD   aspirin 81 MG EC tablet Take 81 mg by mouth Daily.    Bossman Blount MD   bumetanide (BUMEX) 2 MG tablet Take 2 mg by mouth Daily.    Bossman Blount MD   CITRUCEL oral powder Take 2 application by mouth Daily for 30 doses. 2/13/19 3/15/19  Ken Perry MD   colchicine 0.6 MG tablet Take 0.6 mg by mouth 2 (Two) Times a Day As Needed for Muscle / Joint Pain.    Bossman Blount MD   DULoxetine (CYMBALTA) 60 MG capsule Take 60 mg by mouth Daily.    Bossman Blount MD   escitalopram (LEXAPRO) 10 MG tablet Take 10 mg by mouth Daily.    Bossman Blount MD   insulin regular (humuLIN R) 500 UNIT/ML CONCENTRATED injection Inject 100 Units under the skin 3 (Three) Times a Day Before Meals. Packaging states 100 units with regular meals; 120 units with large meals or 360 units daily    Bossman Blount MD   isosorbide mononitrate (IMDUR) 30 MG 24 " "hr tablet Take 30 mg by mouth Daily.    Bossman Blount MD   lactulose (CEPHULAC) 10 g packet Take 1 packet by mouth 3 (Three) Times a Day. 2/13/19   Ken Perry MD   linaclotide (LINZESS) 290 MCG capsule capsule Take 290 mcg by mouth Every Morning Before Breakfast.    Bossman Blount MD   metOLazone (ZAROXOLYN) 2.5 MG tablet Take 2.5 mg by mouth Daily.    Bossman Blount MD   midodrine (PROAMATINE) 5 MG tablet Take 10 mg by mouth 3 (Three) Times a Day. 1/11/19   Bossman Blount MD   nebivolol (BYSTOLIC) 5 MG tablet Take 5 mg by mouth Daily.    Bossman Blount MD   ondansetron ODT (ZOFRAN-ODT) 4 MG disintegrating tablet Take 4 mg by mouth 4 (Four) Times a Day As Needed for Nausea or Vomiting.    Bossman Blount MD   oxyCODONE-acetaminophen (PERCOCET)  MG per tablet Take 1 tablet by mouth Every 6 (Six) Hours As Needed for Moderate Pain .    Bossman Blount MD   pantoprazole (PROTONIX) 40 MG EC tablet Take 1 tablet by mouth Daily. 9/19/17   Emeka Garay MD   rosuvastatin (CRESTOR) 40 MG tablet Take 40 mg by mouth Daily.    Bossman Blount MD     Objective     Vital Signs: /79   Pulse 68   Temp 97.6 °F (36.4 °C)   Resp 17   Ht 182.9 cm (72\")   Wt (!) 140 kg (309 lb)   SpO2 97%   BMI 41.91 kg/m²   Physical Exam   Constitutional: He is oriented to person, place, and time. He appears well-developed and well-nourished.   HENT:   Head: Normocephalic and atraumatic.   Right Ear: External ear normal.   Left Ear: External ear normal.   Nose: Nose normal.   Mouth/Throat: Oropharyngeal exudate present.   Eyes: Conjunctivae and EOM are normal. Pupils are equal, round, and reactive to light.   Neck: Normal range of motion. Neck supple. No JVD present.   Cardiovascular: Normal rate, regular rhythm, normal heart sounds and intact distal pulses.   Pulmonary/Chest: Effort normal and breath sounds normal.   Abdominal: Soft. Bowel sounds are normal. "   Musculoskeletal: Normal range of motion.   Neurological: He is alert and oriented to person, place, and time.   Skin: Skin is warm and dry.   Psychiatric: He has a normal mood and affect. His behavior is normal.           Results Reviewed:  Lab Results (last 24 hours)     Procedure Component Value Units Date/Time    [place] Draw [[id number]] Collected:  03/03/19 1651    Specimen:  Blood Updated:  03/03/19 1800    Narrative:       The following orders were created for panel order [place] Draw.  Procedure                               Abnormality         Status                     ---------                               -----------         ------                     Red Top[[id number]]                                          Final result                 Please view results for these tests on the individual orders.    Red Top [[id number]] Collected:  03/03/19 1651    Specimen:  Blood Updated:  03/03/19 1800     Extra Tube Hold for add-ons.     Comment: Auto resulted.       Urine Drug Screen - Urine, Clean Catch [[id number]]  (Normal) Collected:  03/03/19 1711    Specimen:  Urine, Clean Catch Updated:  03/03/19 1737     Amphetamine Screen, Urine Negative     Barbiturates Screen, Urine Negative     Benzodiazepine Screen, Urine Negative     Cocaine Screen, Urine Negative     Methadone Screen, Urine Negative     Opiate Screen Negative     Phencyclidine (PCP), Urine Negative     THC, Screen, Urine Negative    Narrative:       Negative Thresholds For Drugs Screened in Urine:    Amphetamines          500 ng/ml  Barbiturates          200 ng/ml  Benzodiazepines       200 ng/ml  Cocaine               150 ng/ml  Methadone             150 ng/ml  Opiates               300 ng/ml  Phencyclidine         25 ng/ml  THC                      50 ng/ml    The normal value for all drugs tested is negative. This report includes final unconfirmed screening results.  A positive result by this assay can be, at your request, sent to the Reference  Lab for confirmation by gas chromatography. Unconfirmed results must not be used for non-medical purposes, such as employment or legal testing. Clinical consideration should be applied to any drug of abuse test result, particularly when unconfirmed results are used.    aPTT [[id number]]  (Normal) Collected:  03/03/19 1651    Specimen:  Blood Updated:  03/03/19 1719     PTT 26.3 seconds     Protime-INR [[id number]]  (Normal) Collected:  03/03/19 1651    Specimen:  Blood Updated:  03/03/19 1719     Protime 13.1 Seconds      INR 0.96    Comprehensive Metabolic Panel [[id number]]  (Abnormal) Collected:  03/03/19 1651    Specimen:  Blood Updated:  03/03/19 1717     Glucose 496 mg/dL      BUN 19 mg/dL      Creatinine 1.07 mg/dL      Sodium 131 mmol/L      Potassium 4.8 mmol/L      Chloride 96 mmol/L      CO2 26.0 mmol/L      Calcium 9.8 mg/dL      Total Protein 6.8 g/dL      Albumin 4.50 g/dL      ALT (SGPT) 20 U/L      AST (SGOT) 24 U/L      Alkaline Phosphatase 66 U/L      Total Bilirubin 0.6 mg/dL      eGFR Non African Amer 70 mL/min/1.73      Globulin 2.3 gm/dL      A/G Ratio 2.0 g/dL      BUN/Creatinine Ratio 17.8     Anion Gap 9.0 mmol/L     Lactic Acid, Plasma [[id number]]  (Normal) Collected:  03/03/19 1651    Specimen:  Blood Updated:  03/03/19 1710     Lactate 1.7 mmol/L     Ammonia [[id number]]  (Abnormal) Collected:  03/03/19 1651    Specimen:  Blood Updated:  03/03/19 1710     Ammonia <9 umol/L     CBC & Differential [[id number]] Collected:  03/03/19 1651    Specimen:  Blood Updated:  03/03/19 1702    Narrative:       The following orders were created for panel order CBC & Differential.  Procedure                               Abnormality         Status                     ---------                               -----------         ------                     CBC Auto Differential[[id number]]        Abnormal            Final result                 Please view results for these tests on the individual orders.    CBC  Auto Differential [[id number]]  (Abnormal) Collected:  03/03/19 1651    Specimen:  Blood Updated:  03/03/19 1702     WBC 9.74 10*3/mm3      RBC 3.96 10*6/mm3      Hemoglobin 11.8 g/dL      Hematocrit 33.9 %      MCV 85.6 fL      MCH 29.8 pg      MCHC 34.8 g/dL      RDW 13.4 %      RDW-SD 41.9 fl      MPV 11.4 fL      Platelets 248 10*3/mm3      Neutrophil % 70.6 %      Lymphocyte % 15.8 %      Monocyte % 8.5 %      Eosinophil % 4.3 %      Basophil % 0.5 %      Immature Grans % 0.3 %      Neutrophils, Absolute 6.87 10*3/mm3      Lymphocytes, Absolute 1.54 10*3/mm3      Monocytes, Absolute 0.83 10*3/mm3      Eosinophils, Absolute 0.42 10*3/mm3      Basophils, Absolute 0.05 10*3/mm3      Immature Grans, Absolute 0.03 10*3/mm3      nRBC 0.0 /100 WBC     Blood Gas, Arterial [[id number]] Collected:  03/03/19 1655    Specimen:  Arterial Blood Updated:  03/03/19 1701     Site Right Radial     Dae's Test Positive     pH, Arterial 7.427 pH units      pCO2, Arterial 38.6 mm Hg      pO2, Arterial 84.0 mm Hg      HCO3, Arterial 25.4 mmol/L      Base Excess, Arterial 1.1 mmol/L      O2 Saturation, Arterial 97.5 %      Temperature 37.0 C      Barometric Pressure for Blood Gas 754 mmHg      Modality Room Air     Ventilator Mode NA     Collected by [id number]     Comment: Meter: [id number]     :  [id number]           Imaging Results (last 24 hours)     Procedure Component Value Units Date/Time    CT Head Without Contrast [[id number]] Collected:  03/03/19 1646     Updated:  03/03/19 1650    Narrative:       EXAMINATION: CT HEAD WO CONTRAST-      3/3/2019 4:34 PM CST     HISTORY: Confusion/delirium, altered LOC, unexplained     In order to have a CT radiation dose as low as reasonably achievable  Automated Exposure Control was utilized for adjustment of the mA and/or  KV according to patient size.     DLP in mGycm= 1411 (rescan due to motion).     Noncontrast head CT compared with 2/7/2019.     Axial, sagittal, and coronal  noncontrast CT imaging of the head.     The visualized paranasal sinuses are clear.     The brain and ventricles have an age appropriate appearance.   There is no hemorrhage or mass-effect.   No acute infarction is seen.     No calvarial abnormality.       Impression:       1. No acute intracranial abnormality is seen.                                         This report was finalized on 03/03/2019 16:47 by Dr. Gomez Mcgill MD.        I have personally reviewed and interpreted the radiology studies and ECG obtained at time of admission.     Assessment / Plan     Assessment:     Hallucinations  Confusion, episodic  DM II hyperglycemia  HTN  HLD  CAD    Plan:      Admit  Consult neurology/nephrology  glucometers  Resume home medications  Repeat lab work in AM  See orders.   Imaging will be left to discretion of the nerurolgoist.   Get correct does of patient medications ie insulin    Code Status: full     I discussed the patient's findings and my recommendations with the patient and wife.       Estimated length of stay 1-3 days    Alejandrina Barnett DO   03/03/19   6:11 PM

## 2019-03-04 NOTE — PROGRESS NOTES
Discharge Planning Assessment  [facility]     Patient Name: Erick Luong  MRN: [id number]  Today's Date: 3/4/2019    Admit Date: 3/3/2019    Discharge Needs Assessment     Row Name 03/04/19 1418       Living Environment    Lives With  spouse      Current Living Arrangements  home/apartment/condo      Primary Care Provided by  self      Provides Primary Care For  no one      Family Caregiver if Needed  spouse      Quality of Family Relationships  helpful;involved;supportive      Able to Return to Prior Arrangements  yes         Resource/Environmental Concerns    Resource/Environmental Concerns  none      Transportation Concerns  car, none         Transition Planning    Patient/Family Anticipates Transition to  home with family    Patient/Family Anticipated Services at Transition  none      Transportation Anticipated  family or friend will provide         Discharge Needs Assessment    Readmission Within the Last 30 Days  no previous admission in last 30 days       Concerns to be Addressed  denies needs/concerns at this time       Equipment Currently Used at Home  none      Anticipated Changes Related to Illness  none     Equipment Needed After Discharge  none      Discharge Coordination/Progress  Pt states that he has RX coverage and a PCP. Pt denies any needs for HH or DME and states that he plans on returning home with wife at discharge. SW will follow and assist as needed.          Discharge Plan    No documentation.       Destination      No service coordination in this encounter.      Durable Medical Equipment      No service coordination in this encounter.      Dialysis/Infusion      No service coordination in this encounter.      Home Medical Care      No service coordination in this encounter.      Community Resources      No service coordination in this encounter.          Demographic Summary    No documentation.       Functional Status    No documentation.       Psychosocial    No documentation.        Abuse/Neglect    No documentation.       Legal    No documentation.       Substance Abuse    No documentation.       Patient Forms    No documentation.           Alesha Epperson

## 2019-03-04 NOTE — CONSULTS
"Neurology Consult Note    Referring Provider: Dr. MARY Rodriguez  Reason for Consultation:        History of present illness:      63 year old males who 2 nights ago became confused per his wife who I spoke with over the phone.  Yesterday morning he fell forward without hitting his head.  He became more confused.  He had visual and auditory hallucinations.  As an example, he saw his young son in his closet.  He saw mops in the bathroom that were not there.  He also endorses RUQ abdominal pain.  This morning his wife tells me he did not know his middle name.  Currently his mental status has improved somewhat but he does believe the clock is \"moving.\"  He told his wife earlier the \"trashcan is moving.\"      He was recently hospitalized and discharged on 2/13 after being found to have air in his hepatic system.  The cause of this was not found.  His blood glucose was 496 on admission.  His sodium was 131.  Ammonia was normal.  ABG showed CO2 of 38.6.  Urine drug screen was negative.  HbA1c:  8.7.  TSH normal.    He has also recently been diagnosed with an autonomic neuropathy causing hypotension.        Past Medical History:   Diagnosis Date   • Arthritis    • CHF (congestive heart failure) (CMS/[facility])    • Coronary artery disease    • Diabetes mellitus (CMS/[facility])    • Hyperlipidemia    • Hypertension    • Myocardial infarction (CMS/[facility])    • Pancreatitis    • Renal disorder    • Sleep apnea with use of continuous positive airway pressure (CPAP)        Allergies   Allergen Reactions   • Bactrim [Sulfamethoxazole-Trimethoprim] Other (See Comments)     \"RENAL FAILURE\"     No current facility-administered medications on file prior to encounter.      Current Outpatient Medications on File Prior to Encounter   Medication Sig   • allopurinol (ZYLOPRIM) 100 MG tablet Take 100 mg by mouth Daily.   • amitriptyline (ELAVIL) 50 MG tablet Take 50 mg by mouth Every Night.   • aspirin 81 MG EC tablet Take 81 mg by mouth Daily.   • " bumetanide (BUMEX) 2 MG tablet Take 2 mg by mouth Daily.   • CITRUCEL oral powder Take 2 application by mouth Daily for 30 doses.   • colchicine 0.6 MG tablet Take 0.6 mg by mouth 2 (Two) Times a Day As Needed for Muscle / Joint Pain.   • DULoxetine (CYMBALTA) 60 MG capsule Take 60 mg by mouth Daily.   • escitalopram (LEXAPRO) 10 MG tablet Take 10 mg by mouth Daily.   • insulin regular (humuLIN R) 500 UNIT/ML CONCENTRATED injection Inject 100 Units under the skin 3 (Three) Times a Day Before Meals. Packaging states 100 units with regular meals; 120 units with large meals or 360 units daily   • isosorbide mononitrate (IMDUR) 30 MG 24 hr tablet Take 30 mg by mouth Daily.   • lactulose (CEPHULAC) 10 g packet Take 1 packet by mouth 3 (Three) Times a Day.   • linaclotide (LINZESS) 290 MCG capsule capsule Take 290 mcg by mouth Every Morning Before Breakfast.   • metOLazone (ZAROXOLYN) 2.5 MG tablet Take 2.5 mg by mouth Daily.   • midodrine (PROAMATINE) 5 MG tablet Take 10 mg by mouth 3 (Three) Times a Day.   • nebivolol (BYSTOLIC) 5 MG tablet Take 5 mg by mouth Daily.   • ondansetron ODT (ZOFRAN-ODT) 4 MG disintegrating tablet Take 4 mg by mouth 4 (Four) Times a Day As Needed for Nausea or Vomiting.   • oxyCODONE-acetaminophen (PERCOCET)  MG per tablet Take 1 tablet by mouth Every 6 (Six) Hours As Needed for Moderate Pain .   • pantoprazole (PROTONIX) 40 MG EC tablet Take 1 tablet by mouth Daily.   • rosuvastatin (CRESTOR) 40 MG tablet Take 40 mg by mouth Daily.       Social History     Socioeconomic History   • Marital status:      Spouse name: Not on file   • Number of children: Not on file   • Years of education: Not on file   • Highest education level: Not on file   Social Needs   • Financial resource strain: Not on file   • Food insecurity - worry: Not on file   • Food insecurity - inability: Not on file   • Transportation needs - medical: Not on file   • Transportation needs - non-medical: Not on file    Occupational History   • Not on file   Tobacco Use   • Smoking status: Never Smoker   • Smokeless tobacco: Never Used   • Tobacco comment: smoked in highschool   Substance and Sexual Activity   • Alcohol use: No   • Drug use: No   • Sexual activity: Not on file   Other Topics Concern   • Not on file   Social History Narrative   • Not on file     Family History   Problem Relation Age of Onset   • Colon cancer Father    • Heart disease Father    • Colon cancer Sister    • Colon polyps Sister    • Alzheimer's disease Mother    • Coronary artery disease Sister    • Coronary artery disease Sister        Review of Systems  A 14 point review of systems was reviewed and was negative except for confusion    Vital Signs   Temp:  [97 °F (36.1 °C)-99.4 °F (37.4 °C)] 98 °F (36.7 °C)  Heart Rate:  [68-93] 73  Resp:  [17-22] 18  BP: (122-157)/(51-88) 150/74    General Exam:  Head:  Normal cephalic, atraumatic  HEENT:  Neck supple  Fundoscopic Exam:  No signs of disc edema  CVS:  Regular rate and rhythm.  No murmurs  Carotid Examination:  No bruits  Lungs:  Clear to auscultation  Abdomen:  Non-tender, Non-distended  Extremities:  No signs of peripheral edema  Skin:  No rashes    Neurologic Exam:    Mental Status:    -Awake, Alert, Oriented X 3  -No word finding difficulties  -No aphasia  -No dysarthria  -Follows simple and complex commands    CN II:  Visual fields full.  Pupils equally reactive to light  CN III, IV, VI:  Extraocular Muscles full with no signs of nystagmus  CN V:  Facial sensory is symmetric with no asymmetries.  CN VII:  Facial motor symmetric  CN VIII:  Gross hearing intact bilaterally  CN IX:  Palate elevates symmetrically  CN X:  Palate elevates symmetrically  CN XI:  Shoulder shrug symmetric  CN XII:  Tongue is midline on protrusion    Motor: (strength out of 5:  1= minimal movement, 2 = movement in plane of gravity, 3 = movement against gravity, 4 = movement against some resistance, 5 = full  strength)    -Right Upper Ext: Proximal: 5 Distal: 5  -Left Upper Ext: Proximal: 5 Distal: 5    -Right Lower Ext: Proximal: 5 Distal: 5  -Left Lower Ext: Proximal: 5 Distal: 5    DTR:  -Right   Bicep: 2+ Tricep: 2+ Brachoradialis: 2+   Patella: 2+ Ankle: 2+ Neg Babinski  -Left   Bicep: 2+ Tricep: 2+ Brachoradialis: 2+   Patella: 2+ Ankle: 2+ Neg Babinski    Sensory:  -Intact to light touch, pinprick, temperature, pain, and proprioception    Coordination:  -Finger to nose intact  -Heel to shin intact  -No ataxia    Gait  -No signs of ataxia  -ambulates unassisted      Results Review:  Lab Results (last 24 hours)     Procedure Component Value Units Date/Time    POC Glucose Once [[id number]]  (Abnormal) Collected:  03/04/19 0809    Specimen:  Blood Updated:  03/04/19 0831     Glucose 279 mg/dL      Comment: : [id number] Ratna MeganMeter ID: [id number]       Hemoglobin A1c [[id number]] Collected:  03/04/19 0531    Specimen:  Blood Updated:  03/04/19 0729     Hemoglobin A1C 8.7 %     Narrative:       Less than 6.0           Non-Diabetic Range  6.0-7.0                 ADA Therapeutic Target  Greater than 7.0        Action Suggested    TSH [[id number]]  (Normal) Collected:  03/04/19 0531    Specimen:  Blood Updated:  03/04/19 0705     TSH 0.751 mIU/mL     Comprehensive Metabolic Panel [[id number]]  (Abnormal) Collected:  03/04/19 0531    Specimen:  Blood Updated:  03/04/19 0642     Glucose 279 mg/dL      BUN 17 mg/dL      Creatinine 1.48 mg/dL      Sodium 136 mmol/L      Potassium 4.5 mmol/L      Chloride 98 mmol/L      CO2 29.0 mmol/L      Calcium 9.2 mg/dL      Total Protein 6.4 g/dL      Albumin 4.20 g/dL      ALT (SGPT) 25 U/L      AST (SGOT) 30 U/L      Alkaline Phosphatase 61 U/L      Total Bilirubin 0.8 mg/dL      eGFR Non African Amer 48 mL/min/1.73      Globulin 2.2 gm/dL      A/G Ratio 1.9 g/dL      BUN/Creatinine Ratio 11.5     Anion Gap 9.0 mmol/L     Magnesium [[id number]]  (Normal) Collected:  03/04/19 0531     Specimen:  Blood Updated:  03/04/19 0640     Magnesium 1.7 mg/dL     Phosphorus [[id number]]  (Abnormal) Collected:  03/04/19 0531    Specimen:  Blood Updated:  03/04/19 0637     Phosphorus 4.9 mg/dL     CBC Auto Differential [[id number]]  (Abnormal) Collected:  03/04/19 0531    Specimen:  Blood Updated:  03/04/19 0624     WBC 7.19 10*3/mm3      RBC 4.03 10*6/mm3      Hemoglobin 11.8 g/dL      Hematocrit 35.5 %      MCV 88.1 fL      MCH 29.3 pg      MCHC 33.2 g/dL      RDW 13.7 %      RDW-SD 44.1 fl      MPV 11.6 fL      Platelets 227 10*3/mm3      Neutrophil % 56.4 %      Lymphocyte % 24.2 %      Monocyte % 9.9 %      Eosinophil % 8.5 %      Basophil % 0.7 %      Immature Grans % 0.3 %      Neutrophils, Absolute 4.06 10*3/mm3      Lymphocytes, Absolute 1.74 10*3/mm3      Monocytes, Absolute 0.71 10*3/mm3      Eosinophils, Absolute 0.61 10*3/mm3      Basophils, Absolute 0.05 10*3/mm3      Immature Grans, Absolute 0.02 10*3/mm3      nRBC 0.0 /100 WBC     POC Glucose Once [[id number]]  (Abnormal) Collected:  03/03/19 2028    Specimen:  Blood Updated:  03/03/19 2100     Glucose 365 mg/dL      Comment: : [id number] Hiram Longo ID: [id number]       POC Glucose Once [[id number]]  (Abnormal) Collected:  03/03/19 1924    Specimen:  Blood Updated:  03/03/19 1936     Glucose 361 mg/dL      Comment: : [id number] Mode [place] ID: [id number]       [place] [facility] [[id number]] Collected:  03/03/19 1651    Specimen:  Blood Updated:  03/03/19 1800    Narrative:       The following orders were created for panel order [facility] [place] [facility].  Procedure                               Abnormality         Status                     ---------                               -----------         ------                     Red Top[[id number]]                                          Final result                 Please view results for these tests on the individual orders.    Red Top [[id number]] Collected:  03/03/19 1651    Specimen:   Blood Updated:  03/03/19 1800     Extra Tube Hold for add-ons.     Comment: Auto resulted.       Urine Drug Screen - Urine, Clean Catch [[id number]]  (Normal) Collected:  03/03/19 1711    Specimen:  Urine, Clean Catch Updated:  03/03/19 1737     Amphetamine Screen, Urine Negative     Barbiturates Screen, Urine Negative     Benzodiazepine Screen, Urine Negative     Cocaine Screen, Urine Negative     Methadone Screen, Urine Negative     Opiate Screen Negative     Phencyclidine (PCP), Urine Negative     THC, Screen, Urine Negative    Narrative:       Negative Thresholds For Drugs Screened in Urine:    Amphetamines          500 ng/ml  Barbiturates          200 ng/ml  Benzodiazepines       200 ng/ml  Cocaine               150 ng/ml  Methadone             150 ng/ml  Opiates               300 ng/ml  Phencyclidine         25 ng/ml  THC                      50 ng/ml    The normal value for all drugs tested is negative. This report includes final unconfirmed screening results.  A positive result by this assay can be, at your request, sent to the Reference Lab for confirmation by gas chromatography. Unconfirmed results must not be used for non-medical purposes, such as employment or legal testing. Clinical consideration should be applied to any drug of abuse test result, particularly when unconfirmed results are used.    aPTT [[id number]]  (Normal) Collected:  03/03/19 1651    Specimen:  Blood Updated:  03/03/19 1719     PTT 26.3 seconds     Protime-INR [[id number]]  (Normal) Collected:  03/03/19 1651    Specimen:  Blood Updated:  03/03/19 1719     Protime 13.1 Seconds      INR 0.96    Comprehensive Metabolic Panel [[id number]]  (Abnormal) Collected:  03/03/19 1651    Specimen:  Blood Updated:  03/03/19 1717     Glucose 496 mg/dL      BUN 19 mg/dL      Creatinine 1.07 mg/dL      Sodium 131 mmol/L      Potassium 4.8 mmol/L      Chloride 96 mmol/L      CO2 26.0 mmol/L      Calcium 9.8 mg/dL      Total Protein 6.8 g/dL      Albumin  4.50 g/dL      ALT (SGPT) 20 U/L      AST (SGOT) 24 U/L      Alkaline Phosphatase 66 U/L      Total Bilirubin 0.6 mg/dL      eGFR Non African Amer 70 mL/min/1.73      Globulin 2.3 gm/dL      A/G Ratio 2.0 g/dL      BUN/Creatinine Ratio 17.8     Anion Gap 9.0 mmol/L     Lactic Acid, Plasma [[id number]]  (Normal) Collected:  03/03/19 1651    Specimen:  Blood Updated:  03/03/19 1710     Lactate 1.7 mmol/L     Ammonia [[id number]]  (Abnormal) Collected:  03/03/19 1651    Specimen:  Blood Updated:  03/03/19 1710     Ammonia <9 umol/L     CBC & Differential [[id number]] Collected:  03/03/19 1651    Specimen:  Blood Updated:  03/03/19 1702    Narrative:       The following orders were created for panel order CBC & Differential.  Procedure                               Abnormality         Status                     ---------                               -----------         ------                     CBC Auto Differential[[id number]]        Abnormal            Final result                 Please view results for these tests on the individual orders.    CBC Auto Differential [[id number]]  (Abnormal) Collected:  03/03/19 1651    Specimen:  Blood Updated:  03/03/19 1702     WBC 9.74 10*3/mm3      RBC 3.96 10*6/mm3      Hemoglobin 11.8 g/dL      Hematocrit 33.9 %      MCV 85.6 fL      MCH 29.8 pg      MCHC 34.8 g/dL      RDW 13.4 %      RDW-SD 41.9 fl      MPV 11.4 fL      Platelets 248 10*3/mm3      Neutrophil % 70.6 %      Lymphocyte % 15.8 %      Monocyte % 8.5 %      Eosinophil % 4.3 %      Basophil % 0.5 %      Immature Grans % 0.3 %      Neutrophils, Absolute 6.87 10*3/mm3      Lymphocytes, Absolute 1.54 10*3/mm3      Monocytes, Absolute 0.83 10*3/mm3      Eosinophils, Absolute 0.42 10*3/mm3      Basophils, Absolute 0.05 10*3/mm3      Immature Grans, Absolute 0.03 10*3/mm3      nRBC 0.0 /100 WBC     Blood Gas, Arterial [[id number]] Collected:  03/03/19 1655    Specimen:  Arterial Blood Updated:  03/03/19 1704     Site Right  Radial     Dae's Test Positive     pH, Arterial 7.427 pH units      pCO2, Arterial 38.6 mm Hg      pO2, Arterial 84.0 mm Hg      HCO3, Arterial 25.4 mmol/L      Base Excess, Arterial 1.1 mmol/L      O2 Saturation, Arterial 97.5 %      Temperature 37.0 C      Barometric Pressure for Blood Gas 754 mmHg      Modality Room Air     Ventilator Mode NA     Collected by [id number]     Comment: Meter: [id number]     :  [id number]             .  Imaging Results (last 24 hours)     Procedure Component Value Units Date/Time    CT Head Without Contrast [[id number]] Collected:  03/03/19 1646     Updated:  03/03/19 1650    Narrative:       EXAMINATION: CT HEAD WO CONTRAST-      3/3/2019 4:34 PM CST     HISTORY: Confusion/delirium, altered LOC, unexplained     In order to have a CT radiation dose as low as reasonably achievable  Automated Exposure Control was utilized for adjustment of the mA and/or  KV according to patient size.     DLP in mGycm= 1411 (rescan due to motion).     Noncontrast head CT compared with 2/7/2019.     Axial, sagittal, and coronal noncontrast CT imaging of the head.     The visualized paranasal sinuses are clear.     The brain and ventricles have an age appropriate appearance.   There is no hemorrhage or mass-effect.   No acute infarction is seen.     No calvarial abnormality.       Impression:       1. No acute intracranial abnormality is seen.                                         This report was finalized on 03/03/2019 16:47 by Dr. Gomez Mcgill MD.          CT Head without contrast reviewed by me:  No acute findings.    Impression    Metabolic encephalopathy likely from hyperglycemia  Acute medical delirium  Autonomic neuropathy    Plan    · EEG  · B12  · Folate  · Strive for normal glycemic levels  · May consider [facility] as an outpatient    I discussed the patients findings and my recommendations with patient and family    Steven Aviles MD  03/04/19  11:12 AM

## 2019-03-05 ENCOUNTER — APPOINTMENT (OUTPATIENT)
Dept: MRI IMAGING | Facility: HOSPITAL | Age: 64
End: 2019-03-05

## 2019-03-05 ENCOUNTER — APPOINTMENT (OUTPATIENT)
Dept: ULTRASOUND IMAGING | Facility: HOSPITAL | Age: 64
End: 2019-03-05

## 2019-03-05 LAB
ALBUMIN SERPL-MCNC: 4 G/DL (ref 3.5–5)
ALBUMIN/GLOB SERPL: 1.7 G/DL (ref 1.1–2.5)
ALP SERPL-CCNC: 65 U/L (ref 24–120)
ALT SERPL W P-5'-P-CCNC: 22 U/L (ref 0–54)
AMYLASE SERPL-CCNC: 60 U/L (ref 30–110)
ANION GAP SERPL CALCULATED.3IONS-SCNC: 7 MMOL/L (ref 4–13)
ARTICHOKE IGE QN: 89 MG/DL (ref 0–99)
AST SERPL-CCNC: 24 U/L (ref 7–45)
BASOPHILS # BLD AUTO: 0.08 10*3/MM3 (ref 0–0.2)
BASOPHILS NFR BLD AUTO: 1.1 % (ref 0–2)
BILIRUB SERPL-MCNC: 0.7 MG/DL (ref 0.1–1)
BUN BLD-MCNC: 20 MG/DL (ref 5–21)
BUN/CREAT SERPL: 15.5 (ref 7–25)
CALCIUM SPEC-SCNC: 8.6 MG/DL (ref 8.4–10.4)
CHLORIDE SERPL-SCNC: 97 MMOL/L (ref 98–110)
CHOLEST SERPL-MCNC: 154 MG/DL (ref 130–200)
CO2 SERPL-SCNC: 31 MMOL/L (ref 24–31)
CREAT BLD-MCNC: 1.29 MG/DL (ref 0.5–1.4)
DEPRECATED RDW RBC AUTO: 44.2 FL (ref 40–54)
EOSINOPHIL # BLD AUTO: 0.74 10*3/MM3 (ref 0–0.7)
EOSINOPHIL NFR BLD AUTO: 9.7 % (ref 0–4)
ERYTHROCYTE [DISTWIDTH] IN BLOOD BY AUTOMATED COUNT: 13.8 % (ref 12–15)
GFR SERPL CREATININE-BSD FRML MDRD: 56 ML/MIN/1.73
GLOBULIN UR ELPH-MCNC: 2.4 GM/DL
GLUCOSE BLD-MCNC: 266 MG/DL (ref 70–100)
GLUCOSE BLDC GLUCOMTR-MCNC: 295 MG/DL (ref 70–130)
GLUCOSE BLDC GLUCOMTR-MCNC: 299 MG/DL (ref 70–130)
GLUCOSE BLDC GLUCOMTR-MCNC: 317 MG/DL (ref 70–130)
GLUCOSE BLDC GLUCOMTR-MCNC: 378 MG/DL (ref 70–130)
HCT VFR BLD AUTO: 37.2 % (ref 40–52)
HDLC SERPL-MCNC: 29 MG/DL
HGB BLD-MCNC: 12.6 G/DL (ref 14–18)
IMM GRANULOCYTES # BLD AUTO: 0.03 10*3/MM3 (ref 0–0.05)
IMM GRANULOCYTES NFR BLD AUTO: 0.4 % (ref 0–5)
LDLC/HDLC SERPL: 2.59 {RATIO}
LIPASE SERPL-CCNC: 38 U/L (ref 23–203)
LYMPHOCYTES # BLD AUTO: 1.62 10*3/MM3 (ref 0.72–4.86)
LYMPHOCYTES NFR BLD AUTO: 21.3 % (ref 15–45)
MCH RBC QN AUTO: 29.6 PG (ref 28–32)
MCHC RBC AUTO-ENTMCNC: 33.9 G/DL (ref 33–36)
MCV RBC AUTO: 87.5 FL (ref 82–95)
MONOCYTES # BLD AUTO: 0.78 10*3/MM3 (ref 0.19–1.3)
MONOCYTES NFR BLD AUTO: 10.3 % (ref 4–12)
NEUTROPHILS # BLD AUTO: 4.35 10*3/MM3 (ref 1.87–8.4)
NEUTROPHILS NFR BLD AUTO: 57.2 % (ref 39–78)
NRBC BLD AUTO-RTO: 0 /100 WBC (ref 0–0)
PLATELET # BLD AUTO: 237 10*3/MM3 (ref 130–400)
PMV BLD AUTO: 11.7 FL (ref 6–12)
POTASSIUM BLD-SCNC: 4.1 MMOL/L (ref 3.5–5.3)
PROT SERPL-MCNC: 6.4 G/DL (ref 6.3–8.7)
RBC # BLD AUTO: 4.25 10*6/MM3 (ref 4.8–5.9)
SODIUM BLD-SCNC: 135 MMOL/L (ref 135–145)
TRIGL SERPL-MCNC: 249 MG/DL (ref 0–149)
VIT B12 BLD-MCNC: 315 PG/ML (ref 239–931)
WBC NRBC COR # BLD: 7.6 10*3/MM3 (ref 4.8–10.8)

## 2019-03-05 PROCEDURE — 70551 MRI BRAIN STEM W/O DYE: CPT

## 2019-03-05 PROCEDURE — 76775 US EXAM ABDO BACK WALL LIM: CPT

## 2019-03-05 PROCEDURE — 99233 SBSQ HOSP IP/OBS HIGH 50: CPT | Performed by: PSYCHIATRY & NEUROLOGY

## 2019-03-05 PROCEDURE — 80061 LIPID PANEL: CPT | Performed by: FAMILY MEDICINE

## 2019-03-05 PROCEDURE — 63710000001 INSULIN LISPRO (HUMAN) PER 5 UNITS: Performed by: FAMILY MEDICINE

## 2019-03-05 PROCEDURE — 93880 EXTRACRANIAL BILAT STUDY: CPT

## 2019-03-05 PROCEDURE — 80053 COMPREHEN METABOLIC PANEL: CPT | Performed by: FAMILY MEDICINE

## 2019-03-05 PROCEDURE — 93880 EXTRACRANIAL BILAT STUDY: CPT | Performed by: SURGERY

## 2019-03-05 PROCEDURE — 25010000002 ONDANSETRON PER 1 MG: Performed by: FAMILY MEDICINE

## 2019-03-05 PROCEDURE — 63710000001 INSULIN DETEMIR PER 5 UNITS: Performed by: FAMILY MEDICINE

## 2019-03-05 PROCEDURE — G0378 HOSPITAL OBSERVATION PER HR: HCPCS

## 2019-03-05 PROCEDURE — 82607 VITAMIN B-12: CPT | Performed by: PSYCHIATRY & NEUROLOGY

## 2019-03-05 PROCEDURE — 82150 ASSAY OF AMYLASE: CPT | Performed by: FAMILY MEDICINE

## 2019-03-05 PROCEDURE — 85025 COMPLETE CBC W/AUTO DIFF WBC: CPT | Performed by: FAMILY MEDICINE

## 2019-03-05 PROCEDURE — 83690 ASSAY OF LIPASE: CPT | Performed by: FAMILY MEDICINE

## 2019-03-05 PROCEDURE — 97161 PT EVAL LOW COMPLEX 20 MIN: CPT | Performed by: PHYSICAL THERAPIST

## 2019-03-05 PROCEDURE — 82962 GLUCOSE BLOOD TEST: CPT

## 2019-03-05 RX ADMIN — LACTULOSE 10 G: 20 SOLUTION ORAL at 09:30

## 2019-03-05 RX ADMIN — PANTOPRAZOLE SODIUM 40 MG: 40 TABLET, DELAYED RELEASE ORAL at 09:35

## 2019-03-05 RX ADMIN — LOSARTAN POTASSIUM 50 MG: 50 TABLET, FILM COATED ORAL at 09:30

## 2019-03-05 RX ADMIN — SODIUM CHLORIDE, PRESERVATIVE FREE 3 ML: 5 INJECTION INTRAVENOUS at 09:30

## 2019-03-05 RX ADMIN — INSULIN LISPRO 16 UNITS: 100 INJECTION, SOLUTION INTRAVENOUS; SUBCUTANEOUS at 20:30

## 2019-03-05 RX ADMIN — OXYCODONE HYDROCHLORIDE AND ACETAMINOPHEN 1 TABLET: 5; 325 TABLET ORAL at 19:15

## 2019-03-05 RX ADMIN — MIDODRINE HYDROCHLORIDE 10 MG: 10 TABLET ORAL at 17:25

## 2019-03-05 RX ADMIN — INSULIN LISPRO 12 UNITS: 100 INJECTION, SOLUTION INTRAVENOUS; SUBCUTANEOUS at 17:25

## 2019-03-05 RX ADMIN — SODIUM CHLORIDE, PRESERVATIVE FREE 3 ML: 5 INJECTION INTRAVENOUS at 20:38

## 2019-03-05 RX ADMIN — ONDANSETRON HYDROCHLORIDE 4 MG: 2 INJECTION, SOLUTION INTRAMUSCULAR; INTRAVENOUS at 12:03

## 2019-03-05 RX ADMIN — DULOXETINE HYDROCHLORIDE 60 MG: 30 CAPSULE, DELAYED RELEASE ORAL at 09:29

## 2019-03-05 RX ADMIN — INSULIN DETEMIR 20 UNITS: 100 INJECTION, SOLUTION SUBCUTANEOUS at 20:29

## 2019-03-05 RX ADMIN — LACTULOSE 10 G: 20 SOLUTION ORAL at 20:29

## 2019-03-05 RX ADMIN — ONDANSETRON HYDROCHLORIDE 4 MG: 2 INJECTION, SOLUTION INTRAMUSCULAR; INTRAVENOUS at 06:18

## 2019-03-05 RX ADMIN — MIDODRINE HYDROCHLORIDE 10 MG: 10 TABLET ORAL at 09:29

## 2019-03-05 RX ADMIN — SODIUM CHLORIDE, PRESERVATIVE FREE 10 ML: 5 INJECTION INTRAVENOUS at 06:18

## 2019-03-05 RX ADMIN — MIDODRINE HYDROCHLORIDE 10 MG: 10 TABLET ORAL at 20:29

## 2019-03-05 RX ADMIN — INSULIN LISPRO 12 UNITS: 100 INJECTION, SOLUTION INTRAVENOUS; SUBCUTANEOUS at 09:29

## 2019-03-05 RX ADMIN — INSULIN LISPRO 20 UNITS: 100 INJECTION, SOLUTION INTRAVENOUS; SUBCUTANEOUS at 12:03

## 2019-03-05 RX ADMIN — ROSUVASTATIN CALCIUM 40 MG: 20 TABLET, FILM COATED ORAL at 09:29

## 2019-03-05 RX ADMIN — OXYCODONE HYDROCHLORIDE AND ACETAMINOPHEN 1 TABLET: 5; 325 TABLET ORAL at 12:03

## 2019-03-05 RX ADMIN — LACTULOSE 10 G: 20 SOLUTION ORAL at 17:25

## 2019-03-05 RX ADMIN — ONDANSETRON HYDROCHLORIDE 4 MG: 2 INJECTION, SOLUTION INTRAMUSCULAR; INTRAVENOUS at 19:16

## 2019-03-05 RX ADMIN — OXYCODONE HYDROCHLORIDE AND ACETAMINOPHEN 1 TABLET: 5; 325 TABLET ORAL at 06:18

## 2019-03-05 RX ADMIN — METHYLCELLULOSE 500 MG: 500 TABLET ORAL at 09:29

## 2019-03-05 RX ADMIN — METHYLCELLULOSE 500 MG: 500 TABLET ORAL at 20:29

## 2019-03-05 RX ADMIN — ASPIRIN 81 MG: 81 TABLET, DELAYED RELEASE ORAL at 09:30

## 2019-03-05 RX ADMIN — INSULIN DETEMIR 15 UNITS: 100 INJECTION, SOLUTION SUBCUTANEOUS at 09:29

## 2019-03-05 NOTE — PROGRESS NOTES
[facility]  INPATIENT PROGRESS NOTE    Length of Stay: 0  Date of Admission: 3/3/2019  Primary Care Physician: Del Shetty MD    Subjective   Chief Complaint: Right upper quadrant pain.  Altered mental status.    HPI   CT as result discussed with wife and patient.  Plan for ultrasound the kidney to evaluate.  Plan for carotid duplex bilateral.  MRI is pending by nephrology.  Patient denies any chest pain or shortness of breath.  Questionable confusion is back to baseline.  Patient still have problems getting around due to her frequent fall and unstable.    Review of Systems   Constitutional: Positive for activity change, appetite change and fatigue. Negative for chills and fever.   HENT: Negative for hearing loss, nosebleeds, tinnitus and trouble swallowing.    Eyes: Negative for visual disturbance.   Respiratory: Negative for cough, chest tightness, shortness of breath and wheezing.    Cardiovascular: Negative for chest pain, palpitations and leg swelling.   Gastrointestinal: Positive for abdominal pain. Negative for abdominal distention, blood in stool, constipation, diarrhea, nausea and vomiting.   Endocrine: Negative for cold intolerance, heat intolerance, polydipsia, polyphagia and polyuria.   Genitourinary: Negative for decreased urine volume, difficulty urinating, dysuria, flank pain, frequency and hematuria.   Musculoskeletal: Positive for arthralgias, gait problem and myalgias. Negative for joint swelling.   Skin: Negative for rash.   Allergic/Immunologic: Negative for immunocompromised state.   Neurological: Negative for dizziness, syncope, weakness, light-headedness and headaches.   Hematological: Negative for adenopathy. Does not bruise/bleed easily.   Psychiatric/Behavioral: Positive for confusion. Negative for sleep disturbance. The patient is not nervous/anxious.          All pertinent negatives and positives are as above. All other systems  have been reviewed and are negative unless otherwise stated.     Objective    Temp:  [97.2 °F (36.2 °C)-97.9 °F (36.6 °C)] 97.9 °F (36.6 °C)  Heart Rate:  [69-90] 90  Resp:  [16-18] 16  BP: ()/(50-59) 97/50    Intake/Output Summary (Last 24 hours) at 3/5/2019 1408  Last data filed at 3/4/2019 2300  Gross per 24 hour   Intake 720 ml   Output 1050 ml   Net -330 ml     Physical Exam  Constitutional: He is oriented to person, place, and time. He appears well-developed.   HENT:   Head: Normocephalic and atraumatic.   Eyes: Conjunctivae are normal. Pupils are equal, round, and reactive to light.   Neck: Neck supple. No JVD present. No thyromegaly present.   Cardiovascular: Normal rate, regular rhythm, normal heart sounds and intact distal pulses. Exam reveals no gallop and no friction rub.   No murmur heard.  Pulmonary/Chest: Effort normal and breath sounds normal. No respiratory distress. He has no wheezes. He has no rales. He exhibits no tenderness.   Abdominal: Soft. Bowel sounds are normal. He exhibits no distension. There is tenderness. There is guarding. There is no rebound.   Right upper quadrant tenderness/slight guarding.  Gross morbid obesity.   Musculoskeletal: He exhibits no edema, tenderness or deformity.   Lymphadenopathy:     He has no cervical adenopathy.   Neurological: He is alert and oriented to person, place, and time. He displays normal reflexes. No cranial nerve deficit. He exhibits abnormal muscle tone. Coordination abnormal.   Skin: Skin is warm and dry. Capillary refill takes 2 to 3 seconds. No rash noted.   Psychiatric: He has a normal mood and affect. His behavior is normal.   Nursing note and vitals reviewed.         Results Review:  Lab Results (last 24 hours)     Procedure Component Value Units Date/Time    POC Glucose Once [[id number]]  (Abnormal) Collected:  03/05/19 1111    Specimen:  Blood Updated:  03/05/19 1122     Glucose 378 mg/dL      Comment: : [id number] Carlos [place]  ID: [id number]       POC Glucose Once [[id number]]  (Abnormal) Collected:  03/05/19 0747    Specimen:  Blood Updated:  03/05/19 0758     Glucose 295 mg/dL      Comment: : [id number] Carlos [place] ID: [id number]       Vitamin B12 [[id number]]  (Normal) Collected:  03/05/19 0543    Specimen:  Blood Updated:  03/05/19 0735     Vitamin B-12 315 pg/mL     Lipid Panel [[id number]]  (Abnormal) Collected:  03/05/19 0544    Specimen:  Blood Updated:  03/05/19 0659     Total Cholesterol 154 mg/dL      Triglycerides 249 mg/dL      HDL Cholesterol 29 mg/dL      LDL Cholesterol  89 mg/dL      LDL/HDL Ratio 2.59    Comprehensive Metabolic Panel [[id number]]  (Abnormal) Collected:  03/05/19 0544    Specimen:  Blood Updated:  03/05/19 0648     Glucose 266 mg/dL      BUN 20 mg/dL      Creatinine 1.29 mg/dL      Sodium 135 mmol/L      Potassium 4.1 mmol/L      Chloride 97 mmol/L      CO2 31.0 mmol/L      Calcium 8.6 mg/dL      Total Protein 6.4 g/dL      Albumin 4.00 g/dL      ALT (SGPT) 22 U/L      AST (SGOT) 24 U/L      Alkaline Phosphatase 65 U/L      Total Bilirubin 0.7 mg/dL      eGFR Non African Amer 56 mL/min/1.73      Globulin 2.4 gm/dL      A/G Ratio 1.7 g/dL      BUN/Creatinine Ratio 15.5     Anion Gap 7.0 mmol/L     Amylase [[id number]]  (Normal) Collected:  03/05/19 0544    Specimen:  Blood Updated:  03/05/19 0648     Amylase 60 U/L     Lipase [[id number]]  (Normal) Collected:  03/05/19 0544    Specimen:  Blood Updated:  03/05/19 0648     Lipase 38 U/L     CBC & Differential [[id number]] Collected:  03/05/19 0544    Specimen:  Blood Updated:  03/05/19 0635    Narrative:       The following orders were created for panel order CBC & Differential.  Procedure                               Abnormality         Status                     ---------                               -----------         ------                     CBC Auto Differential[[id number]]        Abnormal            Final result                 Please view  results for these tests on the individual orders.    CBC Auto Differential [[id number]]  (Abnormal) Collected:  03/05/19 0544    Specimen:  Blood Updated:  03/05/19 0635     WBC 7.60 10*3/mm3      RBC 4.25 10*6/mm3      Hemoglobin 12.6 g/dL      Hematocrit 37.2 %      MCV 87.5 fL      MCH 29.6 pg      MCHC 33.9 g/dL      RDW 13.8 %      RDW-SD 44.2 fl      MPV 11.7 fL      Platelets 237 10*3/mm3      Neutrophil % 57.2 %      Lymphocyte % 21.3 %      Monocyte % 10.3 %      Eosinophil % 9.7 %      Basophil % 1.1 %      Immature Grans % 0.4 %      Neutrophils, Absolute 4.35 10*3/mm3      Lymphocytes, Absolute 1.62 10*3/mm3      Monocytes, Absolute 0.78 10*3/mm3      Eosinophils, Absolute 0.74 10*3/mm3      Basophils, Absolute 0.08 10*3/mm3      Immature Grans, Absolute 0.03 10*3/mm3      nRBC 0.0 /100 WBC     POC Glucose Once [[id number]]  (Abnormal) Collected:  03/04/19 2223    Specimen:  Blood Updated:  03/04/19 2235     Glucose 344 mg/dL      Comment: : [id number] Castleman TamaraMeter ID: [id number]       POC Glucose Once [[id number]]  (Abnormal) Collected:  03/04/19 1727    Specimen:  Blood Updated:  03/04/19 1738     Glucose 234 mg/dL      Comment: : [id number] Ratna MeganMeter ID: [id number]              Cultures:       Radiology Data:    Imaging Results (last 24 hours)     Procedure Component Value Units Date/Time    CT Abdomen Pelvis Without Contrast [[id number]] Collected:  03/05/19 1352     Updated:  03/05/19 1404    Narrative:       EXAMINATION:  CT ABDOMEN PELVIS WO CONTRAST-  3/4/2019 4:28 PM CST     HISTORY: Abdominal pain, unspecified; R41.82-Altered mental status,  unspecified; R41.0-Disorientation, unspecified; R73.9-Hyperglycemia,  unspecified     TECHNIQUE: Spiral CT was performed of the abdomen and pelvis without  contrast. Multiplanar images were reconstructed.     DLP: 1754 mGy-cm. Automated dosage control was utilized.     COMPARISON: 2/8/2019.     LUNG BASES: There is linear atelectasis  "in both lung bases. There is  coronary artery calcification. Heart size is normal.     LIVER AND SPLEEN: There has been cholecystectomy. The unenhanced liver  and spleen are unremarkable.     PANCREAS: There is fatty atrophy of the pancreas.     KIDNEYS AND ADRENALS: The unenhanced adrenal glands are unremarkable.  There are renal lesions bilaterally that are not fully characterized  without contrast. The largest is in the mid to lower pole on the right  measuring 3.3 cm and has a Hounsfield unit measurement of 41. There are  no renal stones. The ureters are nondilated. The urinary bladder is  mildly distended. There is no thickening of the bladder wall.     BOWEL: The stomach is full of food. There was no oral contrast  administered. Small bowel loops are nondilated. There is moderate stool  throughout the colon. No definite acute bowel abnormality is  appreciated. There is limited evaluation.     OTHER: There is atheromatous disease of the aortoiliac vessels. There  are no pathologically enlarged lymph nodes. There is a small  fat-containing right inguinal hernia. There are degenerative changes of  the spine.       Impression:       1. Linear atelectasis in the lung bases. Atheromatous disease of the  aortoiliac vessels and coronary arteries.  2. Prior cholecystectomy.  3. Fatty atrophy of the pancreas.  4. Bilateral renal lesions are not fully characterized without contrast.  The largest is on the right measuring 3.3 cm with a Hounsfield unit  measurement of 41. This may represent a complex cyst or solid lesion.  Consider further evaluation with renal mass protocol CT or MRI.  5. No small bowel distention. Moderate stool in the colon.  6. Small fat-containing right inguinal hernia.  This report was finalized on 03/05/2019 14:01 by Dr. Raz Mendes MD.          Allergies   Allergen Reactions   • Bactrim [Sulfamethoxazole-Trimethoprim] Other (See Comments)     \"RENAL FAILURE\"       Scheduled meds:     aspirin 81 " mg Oral Daily   DULoxetine 60 mg Oral Daily   insulin detemir 15 Units Subcutaneous Q12H   insulin lispro 0-24 Units Subcutaneous 4x Daily With Meals & Nightly   lactulose 10 g Oral TID   losartan 50 mg Oral Q24H   methylcellulose (Laxative) 1 tablet Oral BID   midodrine 10 mg Oral TID   pantoprazole 40 mg Oral Daily   rosuvastatin 40 mg Oral Daily   sodium chloride 3 mL Intravenous Q12H       PRN meds:  •  acetaminophen  •  bumetanide  •  colchicine  •  dextrose  •  dextrose  •  glucagon (human recombinant)  •  metoprolol succinate XL  •  ondansetron  •  ondansetron ODT  •  oxyCODONE-acetaminophen  •  sodium chloride    Assessment/Plan       CAD (coronary artery disease)    Diabetes mellitus (CMS/HCC)    Chronic kidney disease, stage III (moderate) (CMS/HCC)    Abdominal pain    Altered mental status      Plan:  Altered mental status/hallucination.  Improving.  Consult neurology.  CT scan head shows no acute changes.  EEG is pending.  B12 and folic pending.  MRI of the head pending by neurology.  Carotid duplex.    Hypotension/diagnoses autonomicdisturbance.  Continue Midodrin. Consider Nocera as outpatient per nephrology.       Diabetes.  High Sliding scale. Increase Levemir.  [id number] 8.7.     CAD/hyperlipidemia.  Continue aspirin.  Continue Bumex prn per pt.   Continue metoprolol prn pt.  Continue Crestor.  Echocardiogram 1/18/18 ejection fraction 56%, moderate concentric hypertrophy, no hemodynamically significant valvular abnormality.     Chronic kidney disease stage III.  Creatinine 1.28.  See Dr. Lomas as outpatient.      Right upper quadrant pain.    Lipase and amylase negative.  CT scan abdomen pelvic- atelectasis lung base atheromatous disease of the aortic iliac vessels and coronary arteries, prior cholecystectomy, fatty atrophy of the pancreas, bilateral renal lesion-right measuring 3.3 cm-may present complexes versus solid lesion-consider further evaluation with renal mass protocol CT or MRI,  moderate stool in the colon, small fatty containing right inguinal hernia.     Renal mass versus complex cyst.  Outpt workup by his primary care.  Ultrasound of the kidneys.    Chronic pain.  Continue Percocet.  Drug screen was negative.    Reflux.  Continue Protonix.     Chronic constipation.  Continue lactulose.  Continue Citrucel.     Depression/anxiety/insomnia.   Continue Cymbalta.       Gross morbid obesity.  BMI is 41.     Nutrition- consistent carb diet.      Deconditioning.  PT and OT consult     Discharge Plannin-3 days.     Telly Rodriguez MD   19   2:08 PM

## 2019-03-05 NOTE — THERAPY EVALUATION
Acute Care - Physical Therapy Initial Evaluation  [facility]     Patient Name: Erick Luong  : 1955  MRN: [id number]  Today's Date: 3/5/2019   Onset of Illness/Injury or Date of Surgery: 19  Date of Referral to PT: 19  Referring Physician: Dr. Rodriguez      Admit Date: 3/3/2019    Visit Dx:     ICD-10-CM ICD-9-CM   1. Altered mental status, unspecified altered mental status type R41.82 780.97   2. Confusion R41.0 298.9   3. Hyperglycemia R73.9 790.29   4. Impaired functional mobility, balance, and endurance Z74.09 V49.89     Patient Active Problem List   Diagnosis   • Lower abdominal pain   • SIRS (systemic inflammatory response syndrome) (CMS/[facility])   • Fever, unknown origin   • Viral gastroenteritis   • Chronic diastolic congestive heart failure (CMS/[facility])   • CAD (coronary artery disease)   • Diabetes mellitus (CMS/[facility])   • Hypertension   • Sleep apnea   • Arthritis   • Mixed hyperlipidemia   • Shortness of breath   • Acute pancreatitis   • Chest pain, non-cardiac   • Obesity, unspecified obesity severity, unspecified obesity type   • HTN (hypertension), benign   • Epigastric pain   • Type 2 diabetes mellitus with hyperglycemia, with long-term current use of insulin (CMS/[facility])   • Pleuritic chest pain   • Slow transit constipation   • Nausea and vomiting   • Chronic kidney disease, stage III (moderate) (CMS/HCC)   • Nonsmoker   • Orthostatic hypotension   • Abdominal pain   • Constipation   • Ischemic colitis (CMS/HCC)   • Altered mental status     Past Medical History:   Diagnosis Date   • Arthritis    • CHF (congestive heart failure) (CMS/[facility])    • Coronary artery disease    • Diabetes mellitus (CMS/[facility])    • Hyperlipidemia    • Hypertension    • Myocardial infarction (CMS/[facility])    • Pancreatitis    • Renal disorder    • Sleep apnea with use of continuous positive airway pressure (CPAP)      Past Surgical History:   Procedure Laterality Date   • ABDOMINAL SURGERY     • APPENDECTOMY     • BACK  SURGERY     • CARDIAC SURGERY     • CATARACT EXTRACTION     • CERVICAL SPINE SURGERY     • COLONOSCOPY N/A 1/31/2017    Normal exam repeat in 5 years   • COLONOSCOPY N/A 2/11/2019    Procedure: COLONOSCOPY WITH ANESTHESIA;  Surgeon: Tyrell Smith MD;  Location: [facility] ENDOSCOPY;  Service: Gastroenterology   • COLONOSCOPY W/ POLYPECTOMY  03/04/2014    Hyperplastic polyp   • CORONARY ARTERY BYPASS GRAFT  10/2015   • ENDOSCOPY  04/13/2011    Gastritis with hemorrhage   • ENDOSCOPY N/A 5/5/2017    Normal exam   • ENDOSCOPY N/A 2/11/2019    Procedure: ESOPHAGOGASTRODUODENOSCOPY WITH ANESTHESIA;  Surgeon: Tyrell Smith MD;  Location: [facility] ENDOSCOPY;  Service: Gastroenterology   • INCISION AND DRAINAGE OF WOUND Left 09/2007    spider bite        PT ASSESSMENT (last 12 hours)      Physical Therapy Evaluation     Row Name 03/05/19 1438          PT Evaluation Time/Intention    Subjective Information  complains of;pain;dizziness;nausea/vomiting;weakness B LE weakness  -MS     Document Type  evaluation  -MS     Mode of Treatment  physical therapy  -MS     Row Name 03/05/19 1438          General Information    Patient Profile Reviewed?  yes  -MS     Onset of Illness/Injury or Date of Surgery  03/03/19  -MS     Referring Physician  Dr. 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  -MS     General Observations of Patient  pt sitting up in chair, awake and in no apparent distress  -MS     Prior Level of Function  independent:;all household mobility;community mobility;ADL's;bathing;dressing  -MS     Equipment Currently Used at Home  none  -MS     Pertinent History of Current Functional Problem  metabolic encephalopathy likely from hyperglycemia, acute medical delirium, autonomic neuropathy  -MS     Existing Precautions/Restrictions  fall  -MS     Risks Reviewed  patient:;LOB;nausea/vomiting;dizziness;increased  discomfort  -MS     Benefits Reviewed  patient:;improve function;increase independence;increase balance;increase knowledge  -MS     Barriers to Rehab  ineffective coping  -MS     Row Name 03/05/19 1438          Relationship/Environment    Primary Source of Support/Comfort  child(julia);spouse  -MS     Lives With  child(julia), dependent;spouse 16yo daughter  -MS     Row Name 03/05/19 1438          Home Main Entrance    Number of Stairs, Main Entrance  one  -MS     Row Name 03/05/19 1438          Cognitive Assessment/Interventions    Additional Documentation  Cognitive Assessment/Intervention (Group)  -MS     Row Name 03/05/19 1438          Cognitive Assessment/Intervention- PT/OT    Affect/Mental Status (Cognitive)  flat/blunted affect  -MS     Orientation Status (Cognition)  oriented to;person;place;time pt says he is here because his stomach and back hurt  -MS     Follows Commands (Cognition)  WNL  -MS     Personal Safety Interventions  fall prevention program maintained;elopement precautions initiated;gait belt;nonskid shoes/slippers when out of bed;supervised activity  -MS     Cognitive Assessment/Intervention Comment  3 word recall: immediate 3/3, 5 min: 2/3  -MS     Row Name 03/05/19 1438          Safety Issues, Functional Mobility    Impairments Affecting Function (Mobility)  balance  -MS     Row Name 03/05/19 1438          Bed Mobility Assessment/Treatment    Comment (Bed Mobility)  pt sitting in chair  -MS     Row Name 03/05/19 1438          Transfer Assessment/Treatment    Transfer Assessment/Treatment  sit-stand transfer;stand-sit transfer  -MS     Sit-Stand [place] (Transfers)  supervision  -MS     Stand-Sit [place] (Transfers)  supervision  -MS     Row Name 03/05/19 1438          Gait/Stairs Assessment/Training    [place] Level (Gait)  contact guard  -MS     Distance in Feet (Gait)  175ft, pt was able to demonstrate walking forwarad, backward, and side to side without LOB but has severe fear  of falling  -MS     Row Name 03/05/19 1438          General ROM    GENERAL ROM COMMENTS  all extremities are WNL  -MS     Row Name 03/05/19 1438          MMT (Manual Muscle Testing)    General MMT Comments  all extremities 5/5  -MS     Row Name 03/05/19 1438          Motor Assessment/Intervention    Additional Documentation  Balance (Group);Fine Motor Testing & Training (Group);Gross Motor Coordination (Group)  -MS     Row Name 03/05/19 1438          Gross Motor Coordination    Gross Motor Skill, Impairments Detail  finger to nose and heel to shin intact  -MS     Row Name 03/05/19 1438          Balance    Balance  static standing balance  -MS     Row Name 03/05/19 1438          Static Standing Balance    Level of [place] (Unsupported Standing, Static Balance)  contact guard assist  -MS     Comment (Unsupported Standing, Static Balance)  pt increased sway in all directions with rhomberg with EO/EC, pt unable to stand tandem  -MS     Row Name 03/05/19 1438          Fine Motor Testing & Training    Comment, Fine Motor Coordination  opposition intact B  -MS     Row Name 03/05/19 1438          Sensory Assessment/Intervention    Sensory General Assessment  -- chronic numbness due to PN  -MS     Row Name 03/05/19 1438          Vision Assessment/Intervention    Vision Assessment Comment  pt reports that the floor and the walls seem to be shaking at times. Pt able to track in all directions  -MS     Row Name 03/05/19 1438          Pain Assessment    Additional Documentation  Pain Scale: Numbers Pre/Post-Treatment (Group)  -MS     Row Name 03/05/19 1438          Pain Scale: Numbers Pre/Post-Treatment    Pain Scale: Numbers, Pretreatment  5/10  -MS     Pain Location - Side  Right  -MS     Pain Location - Orientation  lower  -MS     Pain Location  abdomen  -MS     Row Name 03/05/19 1438          Physical Therapy Clinical Impression    Date of Referral to PT  03/04/19  -MS     Patient/Family Goals Statement (PT Clinical  "Impression)  \"I don't want to fall\"  -MS     Criteria for Skilled Interventions Met (PT Clinical Impression)  yes;treatment indicated  -MS     Pathology/Pathophysiology Noted (Describe Specifically for Each System)  neuromuscular  -MS     Impairments Found (describe specific impairments)  gait, locomotion, and balance  -MS     Rehab Potential (PT Clinical Summary)  good, to achieve stated therapy goals  -MS     Predicted Duration of Therapy (PT)  until discharge  -MS     Care Plan Review (PT)  evaluation/treatment results reviewed;care plan/treatment goals reviewed;risks/benefits reviewed;current/potential barriers reviewed;patient/other agree to care plan  -MS     Row Name 03/05/19 1438          Physical Therapy Goals    Gait Training Goal Selection (PT)  gait training, PT goal 1  -MS     Balance Goal Selection (PT)  balance, PT goal 1;balance, PT goal 2  -MS     Additional Documentation  Balance Goal Selection (PT) (Row)  -MS     Row Name 03/05/19 1438          Gait Training Goal 1 (PT)    Activity/Assistive Device (Gait Training Goal 1, PT)  gait (walking locomotion);backward stepping;decrease fall risk;increase endurance/gait distance;sidestepping decrease fear of fallng  -MS     [place] Level (Gait Training Goal 1, PT)  independent  -MS     Distance (Gait Goal 1, PT)  200ft  -MS     Time Frame (Gait Training Goal 1, PT)  long term goal (LTG);by discharge  -MS     Progress/Outcome (Gait Training Goal 1, PT)  goal ongoing  -MS     Row Name 03/05/19 1438          Balance Goal 1 (PT)    Activity/Assistive Device (Balance Goal 1, PT)  standing, static tandem stance with EC  -MS     Love Level/Cues Needed (Balance Goal 1, PT)  independent 30 sec  -MS     Time Frame (Balance Goal 1, PT)  long term goal (LTG);by discharge  -MS     Progress/Outcomes (Balance Goal 1, PT)  goal ongoing  -MS     Row Name 03/05/19 1438          Balance Goal 2 (PT)    Activity/Assistive Device (Balance Goal 2, PT)  standing, " dynamic reaching in all directions  -MS     [place] Level (Balance Goal 2, PT)  independent  -MS     Time Frame (Balance Goal 2, PT)  long term goal (LTG);by discharge  -MS     Progress/Outcome (Balance Goal 2, PT)  goal ongoing  -MS     Row Name 03/05/19 1438          Positioning and Restraints    Post Treatment Position  chair  -MS     In Chair  sitting;call light within reach;encouraged to call for assist;exit alarm on  -MS     Row Name 03/05/19 1438          Living Environment    Home Accessibility  stairs to enter home  -MS       User Key  (r) = Recorded By, (t) = Taken By, (c) = Cosigned By    Initials Name Provider Type    MS Claire Dillon, PT, DPT, NCS Physical Therapist        Physical Therapy Education     Title: PT OT SLP Therapies (In Progress)     Topic: Physical Therapy (In Progress)     Point: Mobility training (Done)     Learning Progress Summary           Patient Acceptance, E, VU by MS at 3/5/2019  3:33 PM    Comment:  role of therapy in his care                               User Key     Initials Effective Dates Name Provider Type Discipline    MS 06/19/18 -  Claire Dillon, PT, DPT, NCS Physical Therapist PT              PT Recommendation and Plan  Anticipated Discharge Disposition (PT): home with assist  Planned Therapy Interventions (PT Eval): balance training, gait training, patient/family education, neuromuscular re-education  Therapy Frequency (PT Clinical Impression): 2 times/day  Outcome Summary/Treatment Plan (PT)  Anticipated Discharge Disposition (PT): home with assist  Plan of Care Reviewed With: patient  Progress: improving  Outcome Summary: PT evaluation completed. The patient presents with a flat affect with a severe fear of falling. He has no focal neurological deficits for ROM, muscle strength, sensation, or vision. He still reports that he sees the walls or floor shaking at times but this is intermittent. The patient would benefit from therapy to assist with decreasing  his fear of falling and improviing his standing balance. Recommend discharge home with assist at time of discharge.   Outcome Measures     Row Name 03/05/19 1438             How much help from another person do you currently need...    Turning from your back to your side while in flat bed without using bedrails?  4  -MS      Moving from lying on back to sitting on the side of a flat bed without bedrails?  4  -MS      Moving to and from a bed to a chair (including a wheelchair)?  3  -MS      Standing up from a chair using your arms (e.g., wheelchair, bedside chair)?  3  -MS      Climbing 3-5 steps with a railing?  3  -MS      To walk in hospital room?  3  -MS      AM-PAC 6 Clicks Score  20  -MS         Functional Assessment    Outcome Measure Options  AM-PAC 6 Clicks Basic Mobility (PT)  -MS        User Key  (r) = Recorded By, (t) = Taken By, (c) = Cosigned By    Initials Name Provider Type    MS Claire Dillon, PT, DPT, NCS Physical Therapist         Time Calculation:   PT Charges     Row Name 03/05/19 1535             Time Calculation    Start Time  1445  -MS      Stop Time  1515  -MS      Time Calculation (min)  30 min  -MS      PT Received On  03/05/19  -MS      PT Goal Re-Cert Due Date  03/15/19  -MS        User Key  (r) = Recorded By, (t) = Taken By, (c) = Cosigned By    Initials Name Provider Type    MS Claire Dillon, PT, DPT, NCS Physical Therapist        Therapy Suggested Charges     Code   Minutes Charges    None           Therapy Charges for Today     Code Description Service Date Service Provider Modifiers Qty    80827989285 HC PT EVAL LOW COMPLEXITY 2 3/5/2019 Claire Dillon PT, DPT, NCS GP 1          PT G-Codes  Outcome Measure Options: AM-PAC 6 Clicks Basic Mobility (PT)  AM-PAC 6 Clicks Score: 20      Claire Dillon PT, DPT, NCS  3/5/2019

## 2019-03-05 NOTE — PLAN OF CARE
Problem: Patient Care Overview  Goal: Plan of Care Review  Outcome: Ongoing (interventions implemented as appropriate)   03/05/19 5114   Coping/Psychosocial   Plan of Care Reviewed With patient;spouse   Plan of Care Review   Progress no change   OTHER   Outcome Summary Pt alert and oriented x4, but forgetful. C/o neck pain and R abdominal pain. PRN percocet given with zofran q6hrs. Unsteady gait, assist x1. Tele @ normal sinus. No hallucinations noted this shift. Safety maintained. Will continue to monitor.     Goal: Individualization and Mutuality  Outcome: Ongoing (interventions implemented as appropriate)      Problem: Fall Risk (Adult)  Goal: Identify Related Risk Factors and Signs and Symptoms  Outcome: Ongoing (interventions implemented as appropriate)    Goal: Absence of Fall  Outcome: Ongoing (interventions implemented as appropriate)      Problem: Confusion, Acute (Adult)  Goal: Cognitive/Functional Impairments Minimized  Outcome: Ongoing (interventions implemented as appropriate)    Goal: Safety  Outcome: Ongoing (interventions implemented as appropriate)

## 2019-03-05 NOTE — PLAN OF CARE
Problem: Patient Care Overview  Goal: Plan of Care Review  Outcome: Ongoing (interventions implemented as appropriate)   03/05/19 6163   Coping/Psychosocial   Plan of Care Reviewed With patient   Plan of Care Review   Progress improving   OTHER   Outcome Summary PT evaluation completed. The patient presents with a flat affect with a severe fear of falling. He has no focal neurological deficits for ROM, muscle strength, sensation, or vision. He still reports that he sees the walls or floor shaking at times but this is intermittent. The patient would benefit from therapy to assist with decreasing his fear of falling and improviing his standing balance. Recommend discharge home with assist at time of discharge.

## 2019-03-05 NOTE — PLAN OF CARE
"Problem: Patient Care Overview  Goal: Plan of Care Review  Outcome: Ongoing (interventions implemented as appropriate)   03/05/19 8313   Coping/Psychosocial   Plan of Care Reviewed With patient   Plan of Care Review   Progress improving   OTHER   Outcome Summary Medicated pt for c/o neck, headache, and R abdominal quadrant pain with prn percocet, with relief noted. Pt always states his pain is \" 8\" out of 10, even when he has been sleeping. Alert and oriented x 4. Unsteady gait. Has been in chair most of the night. No auditory or visual hallucinations noted. Safety maintained. Chair alarm on.        Problem: Fall Risk (Adult)  Goal: Identify Related Risk Factors and Signs and Symptoms  Outcome: Ongoing (interventions implemented as appropriate)    Goal: Absence of Fall  Outcome: Ongoing (interventions implemented as appropriate)      Problem: Confusion, Acute (Adult)  Goal: Cognitive/Functional Impairments Minimized  Outcome: Ongoing (interventions implemented as appropriate)    Goal: Safety  Outcome: Ongoing (interventions implemented as appropriate)        "

## 2019-03-05 NOTE — PROGRESS NOTES
"  Neurology Progress Note      Chief Complaint:  confusion    Subjective     Subjective:    Confusion improved.  He still believes that his vision is \"shakey.\"  The wall and floor appear to be moving.  He denies hallucinations.  His wife remains concerned.  She reminds me today that over the past several weeks since his last hospitalization he has been slow to answer questions.  He has some difficulties with short-term memory as well.  She does believe that his confusion has improved greatly since admission.  Medications:  Current Facility-Administered Medications   Medication Dose Route Frequency Provider Last Rate Last Dose   • acetaminophen (TYLENOL) tablet 650 mg  650 mg Oral Q4H PRN Alejandrina Barnett DO       • aspirin EC tablet 81 mg  81 mg Oral Daily Alejandrina Barnett DO   81 mg at 03/05/19 0930   • bumetanide (BUMEX) tablet 2 mg  2 mg Oral BID PRN Telly Rodriguez MD       • colchicine tablet 0.6 mg  0.6 mg Oral BID PRN Alejandrina Barnett DO       • dextrose (D50W) 25 g/ 50mL Intravenous Solution 25 g  25 g Intravenous Q15 Min PRN Alejandrina Barnett DO       • dextrose (GLUTOSE) oral gel 15 g  15 g Oral Q15 Min PRN Alejandrina Barnett DO       • DULoxetine (CYMBALTA) DR capsule 60 mg  60 mg Oral Daily Alejandrina Barnett DO   60 mg at 03/05/19 0929   • glucagon (human recombinant) (GLUCAGEN DIAGNOSTIC) injection 1 mg  1 mg Subcutaneous PRN Alejandrina Barnett DO       • insulin detemir (LEVEMIR) injection 15 Units  15 Units Subcutaneous Q12H Telly Rodriguez MD   15 Units at 03/05/19 0929   • insulin lispro (humaLOG) injection 0-24 Units  0-24 Units Subcutaneous 4x Daily With Meals & Nightly Alejandrina Barnett DO   12 Units at 03/05/19 0929   • lactulose solution 10 g  10 g Oral TID Alejandrina Barnett DO   10 g at 03/05/19 0930   • losartan (COZAAR) tablet 50 mg  50 mg Oral Q24H Telly Rodriguez MD   50 mg at 03/05/19 0930   • methylcellulose (Laxative) (CITRUCEL) tablet 500 mg  1 tablet Oral " BID Alejandrina Barnett DO   500 mg at 03/05/19 0929   • metoprolol succinate XL (TOPROL-XL) 24 hr tablet 25 mg  25 mg Oral Daily PRN Telly Rodriguez MD       • midodrine (PROAMATINE) tablet 10 mg  10 mg Oral TID Alejandrina Barnett DO   10 mg at 03/05/19 0929   • ondansetron (ZOFRAN) injection 4 mg  4 mg Intravenous Q6H PRN Alejandrina Barnett DO   4 mg at 03/05/19 0618   • ondansetron ODT (ZOFRAN-ODT) disintegrating tablet 4 mg  4 mg Oral 4x Daily PRN Alejandrina Barnett DO       • oxyCODONE-acetaminophen (PERCOCET) 5-325 MG per tablet 1 tablet  1 tablet Oral Q6H PRN Telly Rodriguez MD   1 tablet at 03/05/19 0618   • pantoprazole (PROTONIX) EC tablet 40 mg  40 mg Oral Daily Alejandrina Barnett DO   40 mg at 03/05/19 0935   • rosuvastatin (CRESTOR) tablet 40 mg  40 mg Oral Daily Alejandrina Barnett DO   40 mg at 03/05/19 0929   • sodium chloride 0.9 % flush 3 mL  3 mL Intravenous Q12H Alejandrina Barnett DO   3 mL at 03/05/19 0930   • sodium chloride 0.9 % flush 3-10 mL  3-10 mL Intravenous PRN Alejandrina Barnett DO   10 mL at 03/05/19 0618       Review of Systems:   -A 14 point review of systems is completed and is negative except for previous confusion      Objective      Vital Signs  Temp:  [97.2 °F (36.2 °C)-97.9 °F (36.6 °C)] 97.9 °F (36.6 °C)  Heart Rate:  [69-81] 80  Resp:  [18] 18  BP: (114-125)/(54-68) 114/55    Physical Exam:    General Exam:  Head:  Normal cephalic, atraumatic  HEENT:  Neck supple  Fundoscopic Exam:  No signs of disc edema  CVS:  Regular rate and rhythm.  No murmurs  Carotid Examination:  No bruits  Lungs:  Clear to auscultation  Abdomen:  Non-tender, Non-distended  Extremities:  No signs of peripheral edema  Skin:  No rashes    Neurologic Exam:    Mental Status:    -Awake, Alert, Oriented X 3  -No word finding difficulties  -No aphasia  -No dysarthria  -Follows simple and complex commands    CN II:  Visual fields full.  Pupils equally reactive to light  CN III, IV, VI:   Extraocular Muscles full with no signs of nystagmus  CN V:  Facial sensory is symmetric with no asymmetries.  CN VII:  Facial motor symmetric  CN VIII:  Gross hearing intact bilaterally  CN IX:  Palate elevates symmetrically  CN X:  Palate elevates symmetrically  CN XI:  Shoulder shrug symmetric  CN XII:  Tongue is midline on protrusion    Motor: (strength out of 5:  1= minimal movement, 2 = movement in plane of gravity, 3 = movement against gravity, 4 = movement against some resistance, 5 = full strength)    -Right Upper Ext: Proximal: 5 Distal: 5  -Left Upper Ext: Proximal: 5 Distal: 5    -Right Lower Ext: Proximal: 5 Distal: 5  -Left Lower Ext: Proximal: 5 Distal: 5    DTR:  -Right   Bicep: 2+ Tricep: 2+ Brachioradialis: 2+   Patella: 2+ Ankle: 2+ Neg Babinski  -Left   Bicep: 2+ Tricep: 2+ Brachioradialis: 2+   Patella: 2+ Ankle: 2+ Neg Babinski    Sensory:  -Intact to light touch, pinprick, temperature, pain, and proprioception    Coordination:  -Finger to nose intact  -Heel to shin intact  -No ataxia    Gait  -No signs of ataxia  -ambulates unassisted       Results Review:    I reviewed the patient's new clinical results.    Results from last 7 days   Lab Units 03/05/19 0544 03/04/19  0531 03/03/19  1651   WBC 10*3/mm3 7.60 7.19 9.74   HEMOGLOBIN g/dL 12.6* 11.8* 11.8*   HEMATOCRIT % 37.2* 35.5* 33.9*   PLATELETS 10*3/mm3 237 227 248        Results from last 7 days   Lab Units 03/05/19 0544 03/04/19  0531 03/03/19  1651   SODIUM mmol/L 135 136 131*   POTASSIUM mmol/L 4.1 4.5 4.8   CHLORIDE mmol/L 97* 98 96*   CO2 mmol/L 31.0 29.0 26.0   BUN mg/dL 20 17 19   CREATININE mg/dL 1.29 1.48* 1.07   CALCIUM mg/dL 8.6 9.2 9.8   BILIRUBIN mg/dL 0.7 0.8 0.6   ALK PHOS U/L 65 61 66   ALT (SGPT) U/L 22 25 20   AST (SGOT) U/L 24 30 24   GLUCOSE mg/dL 266* 279* 496*        Lab Results   Component Value Date    PHOS 4.9 (H) 03/04/2019    MG 1.7 03/04/2019    PROTIME 13.1 03/03/2019    INR 0.96 03/03/2019     No components  found for: POCGLUC  No components found for: A1C  Lab Results   Component Value Date    HDL 29 (L) 03/05/2019    LDL 89 03/05/2019     No components found for: B12  Lab Results   Component Value Date    TSH 0.751 03/04/2019     EEG reviewed by me and is unremarkable  CT Head without contrast reviewed by me:  No acute findings.      Assessment/Plan     Hospital Problem List      Altered mental status    Impression:  Metabolic encephalopathy likely from hyperglycemia  Acute medical delirium  Autonomic neuropathy        Plan:  · EEG - normal  · B12 - normal  · Folate - normal  · Strive for normal glycemic levels  · May consider [facility] as an outpatient  · Will perform MRI Brain to rule out CNS structural lesions as cause of cognition and memory disturbances.              Steven Aviles MD  03/05/19  11:15 AM

## 2019-03-06 VITALS
OXYGEN SATURATION: 98 % | WEIGHT: 303 LBS | HEIGHT: 72 IN | SYSTOLIC BLOOD PRESSURE: 123 MMHG | TEMPERATURE: 98.7 F | DIASTOLIC BLOOD PRESSURE: 67 MMHG | BODY MASS INDEX: 41.04 KG/M2 | HEART RATE: 87 BPM | RESPIRATION RATE: 18 BRPM

## 2019-03-06 LAB
ALBUMIN SERPL-MCNC: 3.8 G/DL (ref 3.5–5)
ALBUMIN/GLOB SERPL: 1.8 G/DL (ref 1.1–2.5)
ALP SERPL-CCNC: 68 U/L (ref 24–120)
ALT SERPL W P-5'-P-CCNC: 22 U/L (ref 0–54)
ANION GAP SERPL CALCULATED.3IONS-SCNC: 7 MMOL/L (ref 4–13)
AST SERPL-CCNC: 17 U/L (ref 7–45)
BASOPHILS # BLD AUTO: 0.07 10*3/MM3 (ref 0–0.2)
BASOPHILS NFR BLD AUTO: 0.8 % (ref 0–2)
BILIRUB SERPL-MCNC: 0.7 MG/DL (ref 0.1–1)
BUN BLD-MCNC: 19 MG/DL (ref 5–21)
BUN/CREAT SERPL: 15.8 (ref 7–25)
CALCIUM SPEC-SCNC: 8.7 MG/DL (ref 8.4–10.4)
CHLORIDE SERPL-SCNC: 101 MMOL/L (ref 98–110)
CO2 SERPL-SCNC: 27 MMOL/L (ref 24–31)
CREAT BLD-MCNC: 1.2 MG/DL (ref 0.5–1.4)
DEPRECATED RDW RBC AUTO: 42.7 FL (ref 40–54)
EOSINOPHIL # BLD AUTO: 0.79 10*3/MM3 (ref 0–0.7)
EOSINOPHIL NFR BLD AUTO: 8.6 % (ref 0–4)
ERYTHROCYTE [DISTWIDTH] IN BLOOD BY AUTOMATED COUNT: 13.6 % (ref 12–15)
GFR SERPL CREATININE-BSD FRML MDRD: 61 ML/MIN/1.73
GLOBULIN UR ELPH-MCNC: 2.1 GM/DL
GLUCOSE BLD-MCNC: 164 MG/DL (ref 70–100)
GLUCOSE BLDC GLUCOMTR-MCNC: 206 MG/DL (ref 70–130)
GLUCOSE BLDC GLUCOMTR-MCNC: 235 MG/DL (ref 70–130)
GLUCOSE BLDC GLUCOMTR-MCNC: 278 MG/DL (ref 70–130)
HCT VFR BLD AUTO: 36.8 % (ref 40–52)
HGB BLD-MCNC: 12.6 G/DL (ref 14–18)
IMM GRANULOCYTES # BLD AUTO: 0.02 10*3/MM3 (ref 0–0.05)
IMM GRANULOCYTES NFR BLD AUTO: 0.2 % (ref 0–5)
LYMPHOCYTES # BLD AUTO: 1.76 10*3/MM3 (ref 0.72–4.86)
LYMPHOCYTES NFR BLD AUTO: 19.2 % (ref 15–45)
MCH RBC QN AUTO: 29.7 PG (ref 28–32)
MCHC RBC AUTO-ENTMCNC: 34.2 G/DL (ref 33–36)
MCV RBC AUTO: 86.8 FL (ref 82–95)
MONOCYTES # BLD AUTO: 0.94 10*3/MM3 (ref 0.19–1.3)
MONOCYTES NFR BLD AUTO: 10.2 % (ref 4–12)
NEUTROPHILS # BLD AUTO: 5.6 10*3/MM3 (ref 1.87–8.4)
NEUTROPHILS NFR BLD AUTO: 61 % (ref 39–78)
NRBC BLD AUTO-RTO: 0 /100 WBC (ref 0–0)
PLATELET # BLD AUTO: 237 10*3/MM3 (ref 130–400)
PMV BLD AUTO: 11.4 FL (ref 6–12)
POTASSIUM BLD-SCNC: 4.1 MMOL/L (ref 3.5–5.3)
PROT SERPL-MCNC: 5.9 G/DL (ref 6.3–8.7)
RBC # BLD AUTO: 4.24 10*6/MM3 (ref 4.8–5.9)
SODIUM BLD-SCNC: 135 MMOL/L (ref 135–145)
WBC NRBC COR # BLD: 9.18 10*3/MM3 (ref 4.8–10.8)

## 2019-03-06 PROCEDURE — 85025 COMPLETE CBC W/AUTO DIFF WBC: CPT | Performed by: FAMILY MEDICINE

## 2019-03-06 PROCEDURE — 63710000001 INSULIN LISPRO (HUMAN) PER 5 UNITS: Performed by: FAMILY MEDICINE

## 2019-03-06 PROCEDURE — 97116 GAIT TRAINING THERAPY: CPT

## 2019-03-06 PROCEDURE — 63710000001 INSULIN DETEMIR PER 5 UNITS: Performed by: FAMILY MEDICINE

## 2019-03-06 PROCEDURE — 80053 COMPREHEN METABOLIC PANEL: CPT | Performed by: FAMILY MEDICINE

## 2019-03-06 PROCEDURE — 99232 SBSQ HOSP IP/OBS MODERATE 35: CPT | Performed by: PSYCHIATRY & NEUROLOGY

## 2019-03-06 PROCEDURE — 82962 GLUCOSE BLOOD TEST: CPT

## 2019-03-06 RX ORDER — OXYCODONE AND ACETAMINOPHEN 10; 325 MG/1; MG/1
0.5 TABLET ORAL 4 TIMES DAILY PRN
Status: ON HOLD
Start: 2019-03-06 | End: 2019-10-28

## 2019-03-06 RX ORDER — BUMETANIDE 2 MG/1
2 TABLET ORAL 2 TIMES DAILY PRN
Qty: 30 TABLET | Refills: 2 | Status: SHIPPED | OUTPATIENT
Start: 2019-03-06 | End: 2020-09-02 | Stop reason: HOSPADM

## 2019-03-06 RX ADMIN — LACTULOSE 10 G: 20 SOLUTION ORAL at 17:42

## 2019-03-06 RX ADMIN — METHYLCELLULOSE 500 MG: 500 TABLET ORAL at 08:19

## 2019-03-06 RX ADMIN — INSULIN DETEMIR 20 UNITS: 100 INJECTION, SOLUTION SUBCUTANEOUS at 08:10

## 2019-03-06 RX ADMIN — INSULIN LISPRO 8 UNITS: 100 INJECTION, SOLUTION INTRAVENOUS; SUBCUTANEOUS at 08:10

## 2019-03-06 RX ADMIN — OXYCODONE HYDROCHLORIDE AND ACETAMINOPHEN 1 TABLET: 5; 325 TABLET ORAL at 08:11

## 2019-03-06 RX ADMIN — INSULIN LISPRO 8 UNITS: 100 INJECTION, SOLUTION INTRAVENOUS; SUBCUTANEOUS at 17:42

## 2019-03-06 RX ADMIN — OXYCODONE HYDROCHLORIDE AND ACETAMINOPHEN 1 TABLET: 5; 325 TABLET ORAL at 01:21

## 2019-03-06 RX ADMIN — LACTULOSE 10 G: 20 SOLUTION ORAL at 08:20

## 2019-03-06 RX ADMIN — OXYCODONE HYDROCHLORIDE AND ACETAMINOPHEN 1 TABLET: 5; 325 TABLET ORAL at 14:44

## 2019-03-06 RX ADMIN — SODIUM CHLORIDE, PRESERVATIVE FREE 3 ML: 5 INJECTION INTRAVENOUS at 08:20

## 2019-03-06 RX ADMIN — INSULIN LISPRO 12 UNITS: 100 INJECTION, SOLUTION INTRAVENOUS; SUBCUTANEOUS at 12:04

## 2019-03-06 RX ADMIN — MIDODRINE HYDROCHLORIDE 10 MG: 10 TABLET ORAL at 17:42

## 2019-03-06 RX ADMIN — PANTOPRAZOLE SODIUM 40 MG: 40 TABLET, DELAYED RELEASE ORAL at 08:22

## 2019-03-06 RX ADMIN — DULOXETINE HYDROCHLORIDE 60 MG: 30 CAPSULE, DELAYED RELEASE ORAL at 08:20

## 2019-03-06 RX ADMIN — ASPIRIN 81 MG: 81 TABLET, DELAYED RELEASE ORAL at 08:20

## 2019-03-06 RX ADMIN — LOSARTAN POTASSIUM 50 MG: 50 TABLET, FILM COATED ORAL at 08:20

## 2019-03-06 RX ADMIN — MIDODRINE HYDROCHLORIDE 10 MG: 10 TABLET ORAL at 08:19

## 2019-03-06 RX ADMIN — ROSUVASTATIN CALCIUM 40 MG: 20 TABLET, FILM COATED ORAL at 08:21

## 2019-03-06 NOTE — DISCHARGE SUMMARY
[facility] Medicine Services  DISCHARGE SUMMARY       Date of Admission: 3/3/2019  Date of Discharge:  3/6/2019  Primary Care Physician: Del Shetty MD    Presenting Problem/History of Present Illness:  Altered mental status, unspecified altered mental status type [R41.82]  Altered mental status, unspecified altered mental status type [R41.82]     Final Discharge Diagnoses:  Active Hospital Problems    Diagnosis   • Altered mental status   • Abdominal pain   • Chronic kidney disease, stage III (moderate) (CMS/HCC)   • CAD (coronary artery disease)   • Diabetes mellitus (CMS/[facility])       Consults: Neurology    Pertinent Test Results:   IMPRESSION: MRI of the brain  1. No acute intracranial normality, mild cortical atrophy and minimal  chronic small vessel white matter ischemic changes are identified.  2. Mucous retention cyst in the right maxillary sinus.    Summary: Ultrasound the kidneys  1. 3 cm right renal cyst.    Carotid duplex bilateral-minimum stenosis bilateral.    Summary: Ultrasound the kidneys  1. 3 cm right renal cyst.    IMPRESSION: CT scan abdomen pelvic  1. Linear atelectasis in the lung bases. Atheromatous disease of the  aortoiliac vessels and coronary arteries.  2. Prior cholecystectomy.  3. Fatty atrophy of the pancreas.  4. Bilateral renal lesions are not fully characterized without contrast.  The largest is on the right measuring 3.3 cm with a Hounsfield unit  measurement of 41. This may represent a complex cyst or solid lesion.  Consider further evaluation with renal mass protocol CT or MRI.  5. No small bowel distention. Moderate stool in the colon.  6. Small fat-containing right inguinal hernia.    Chief Complaint on Day of Discharge: Weakness    History of Present Illness on Day of Discharge:  Per wife onset of confusion last PM.  Talking to the dead.  Mistook wife for sister.  Did slowly improve and was felt to resolve but was restless all night  getting up and down.  He go up this AM and had similar confusion.  Did have a falling out episode while urinating but per wife this is not uncommon due to an autonomic disturbance he has while urinating.   He sees Dr Lomas for this.  Mental status waxed and waned all day and finally she brought him to the ER.  Has not seen neurology for symptoms.     Hospital Course:  The patient is a 63 y.o. male who presented to [facility] with altered mental status/hallucinations/falling.      Altered mental status/hallucination.  Improving.  Consult neurology.  CT scan head shows no acute changes.  EEG-normal.  B12 and folic-normal.  MRI of the head - no acute intracranial normality.  Carotid duplex- minimal stenosis bilateral. Metabolic encephalopathy likely from hyperglycemia.  Patient fasting glucose today is 160 this morning.  Sugars doing better.  Dizziness/blurry vision have resolved.  Acute delirium resolved.  Patient denied any suicidal or homicidal thoughts.  Currently patient denies of any visual or auditory hallucination.    Autonomic neuropathy/hypotension.  Patient continue Midodrin.  Patient follow-up with primary care doctor for recommendation refer to autonomic clinic in [place].  Discussed with patient possible medication of Nocera, but is very expensive.     Diabetes.  High Sliding scale. Increase Levemir.  [id number] 8.7.  Patient go back on home insulin of insulin.  Patient follow-up with his primary care physician for better control.     CAD/hyperlipidemia.  Patient to continue aspirin.  Patient continue Bumex prn per pt. patient to continue metoprolol prn per patient.  Patient to continue Crestor.  Echocardiogram 1/18/18 ejection fraction 56%, moderate concentric hypertrophy, no hemodynamically significant valvular abnormality.     Chronic kidney disease stage III.  Creatinine 1.2.  GFR is 61.  Pt see Dr. Lomas as outpatient.      Right upper quadrant pain.    Lipase and amylase negative.  CT  "scan abdomen pelvic- atelectasis lung base atheromatous disease of the aortic iliac vessels and coronary arteries, prior cholecystectomy, fatty atrophy of the pancreas, bilateral renal lesion-right measuring 3.3 cm-may present complexes versus solid lesion-consider further evaluation with renal mass protocol CT or MRI, moderate stool in the colon, small fatty containing right inguinal hernia.     CT scan - renal mass versus complex cyst.  Ultrasound of the kidneys shows 3 cm right renal cyst.     Chronic pain.  Patient to continue Percocet.  Drug screen was negative.     Reflux.  Patient to continue Protonix.     Chronic constipation.  Patient to continue lactulose, and Citrucel.     Depression/anxiety/insomnia.   Patient continue Cymbalta.       Gross morbid obesity.  BMI is 41.  Diet and exercise has been discussed with patient.     Deconditioning.  PT and OT consulted.  Patient continue with outpatient physical therapy.     Vital signs stable, afebrile.  Acute delirium resolved.  Patient follow-up with outpatient physical therapy.  Patient follow with primary care physician in 1 week.     Condition on Discharge:  stable    Physical Exam on Discharge:  /60 (BP Location: Right arm, Patient Position: Sitting)   Pulse 90   Temp 97.9 °F (36.6 °C) (Oral)   Resp 18   Ht 182.9 cm (72\")   Wt (!) 137 kg (303 lb)   SpO2 97%   BMI 41.09 kg/m²   Physical Exam  Constitutional: He is oriented to person, place, and time. He appears well-developed.   HENT:   Head: Normocephalic and atraumatic.   Eyes: Conjunctivae are normal. Pupils are equal, round, and reactive to light.   Neck: Neck supple. No JVD present. No thyromegaly present.   Cardiovascular: Normal rate, regular rhythm, normal heart sounds and intact distal pulses. Exam reveals no gallop and no friction rub.   No murmur heard.  Pulmonary/Chest: Effort normal and breath sounds normal. No respiratory distress. He has no wheezes. He has no rales. He exhibits no " tenderness.   Abdominal: Soft. Bowel sounds are normal. He exhibits no distension. There is right upper quadrant tenderness.  No guarding. Gross morbid obesity.   Musculoskeletal: He exhibits no edema, tenderness or deformity.   Lymphadenopathy:     He has no cervical adenopathy.   Neurological: He is alert and oriented to person, place, and time. He displays normal reflexes. No cranial nerve deficit. He exhibits abnormal muscle tone. Coordination abnormal.   Skin: Skin is warm and dry. Capillary refill takes 2 to 3 seconds. No rash noted.   Psychiatric: He has a normal mood and affect. His behavior is normal.   Nursing note and vitals reviewed.    Discharge Disposition: Home with wife.  Outpatient physical therapy.  Home or Self Care    Discharge Medications:     Discharge Medications      Changes to Medications      Instructions Start Date   bumetanide 2 MG tablet  Commonly known as:  BUMEX  What changed:    · how much to take  · when to take this  · reasons to take this   2 mg, Oral, 2 Times Daily PRN      oxyCODONE-acetaminophen  MG per tablet  Commonly known as:  PERCOCET  What changed:    · how much to take  · when to take this   0.5 tablets, Oral, 4 Times Daily PRN         Continue These Medications      Instructions Start Date   aspirin 81 MG EC tablet   81 mg, Oral, Daily      CITRUCEL oral powder  Generic drug:  methylcellulose   2 g, Oral, Daily      colchicine 0.6 MG tablet   0.6 mg, Oral, 2 Times Daily PRN      CRESTOR 40 MG tablet  Generic drug:  rosuvastatin   40 mg, Oral, Daily      DULoxetine 60 MG capsule  Commonly known as:  CYMBALTA   60 mg, Oral, Daily      insulin glargine 100 UNIT/ML injection  Commonly known as:  LANTUS   20 Units, Subcutaneous, Nightly      insulin regular 500 UNIT/ML CONCENTRATED injection  Commonly known as:  humuLIN R   100 Units, Subcutaneous, 3 Times Daily Before Meals, Packaging states 100 units with regular meals; 120 units with large meals or 360 units daily       irbesartan 300 MG tablet  Commonly known as:  AVAPRO   300 mg, Oral, Nightly      lactulose 10 GM/15ML solution  Commonly known as:  CHRONULAC   20 g, Oral, 3 Times Daily      midodrine 10 MG tablet  Commonly known as:  PROAMATINE   10 mg, Oral, 3 Times Daily      ondansetron ODT 4 MG disintegrating tablet  Commonly known as:  ZOFRAN-ODT   4 mg, Oral, 4 Times Daily PRN      pantoprazole 40 MG EC tablet  Commonly known as:  PROTONIX   40 mg, Oral, Daily         Stop These Medications    isosorbide mononitrate 30 MG 24 hr tablet  Commonly known as:  IMDUR     linaclotide 290 MCG capsule capsule  Commonly known as:  LINZESS     metoprolol succinate XL 25 MG 24 hr tablet  Commonly known as:  TOPROL-XL            Discharge Diet:   Diet Instructions     Advance Diet As Tolerated            Activity at Discharge:   Activity Instructions     Activity as Tolerated            Discharge Care Plan/Instructions: Patient physical therapy.    Follow-up Appointments:   Future Appointments   Date Time Provider Department Center   3/12/2019  1:00 PM [facility]     Follow-up with PCP 1 week.    Telly Rodriguez MD  03/06/19  3:45 PM    Time: Greater than 30 minutes.

## 2019-03-06 NOTE — PROGRESS NOTES
Neurology Progress Note      Chief Complaint:  confusion    Subjective     Subjective:    Mental status improving.  His vision is improving as well.  His wife is currently at the bedside and assist in the history.  Me and her assist him in getting up and he ambulates around the room with minimal assistance.    Medications:  Current Facility-Administered Medications   Medication Dose Route Frequency Provider Last Rate Last Dose   • acetaminophen (TYLENOL) tablet 650 mg  650 mg Oral Q4H PRN Alejandrina Barnett DO       • aspirin EC tablet 81 mg  81 mg Oral Daily Alejandrina Barnett DO   81 mg at 03/06/19 0820   • bumetanide (BUMEX) tablet 2 mg  2 mg Oral BID PRN Telly Rodriguez MD       • colchicine tablet 0.6 mg  0.6 mg Oral BID PRN Alejandrina Barnett DO       • dextrose (D50W) 25 g/ 50mL Intravenous Solution 25 g  25 g Intravenous Q15 Min PRN Alejandrina Barnett DO       • dextrose (GLUTOSE) oral gel 15 g  15 g Oral Q15 Min PRN Alejandrina Barnett DO       • DULoxetine (CYMBALTA) DR capsule 60 mg  60 mg Oral Daily Alejandrina Barnett DO   60 mg at 03/06/19 0820   • glucagon (human recombinant) (GLUCAGEN DIAGNOSTIC) injection 1 mg  1 mg Subcutaneous PRN Alejandrina Barnett DO       • insulin detemir (LEVEMIR) injection 20 Units  20 Units Subcutaneous Q12H Telly Rodriguez MD   20 Units at 03/06/19 0810   • insulin lispro (humaLOG) injection 0-24 Units  0-24 Units Subcutaneous 4x Daily With Meals & Nightly Alejandrina Barnett DO   8 Units at 03/06/19 0810   • lactulose solution 10 g  10 g Oral TID Alejandrina Barnett DO   10 g at 03/06/19 0820   • losartan (COZAAR) tablet 50 mg  50 mg Oral Q24H Telly Rodriguez MD   50 mg at 03/06/19 0820   • methylcellulose (Laxative) (CITRUCEL) tablet 500 mg  1 tablet Oral BID Alejandrina Barnett DO   500 mg at 03/06/19 0819   • metoprolol succinate XL (TOPROL-XL) 24 hr tablet 25 mg  25 mg Oral Daily PRN Telly Rodriguez MD       • midodrine (PROAMATINE) tablet 10 mg  10  mg Oral TID Alejandrina Barnett DO   10 mg at 03/06/19 0819   • ondansetron (ZOFRAN) injection 4 mg  4 mg Intravenous Q6H PRN Alejandrina Barnett DO   4 mg at 03/05/19 1916   • ondansetron ODT (ZOFRAN-ODT) disintegrating tablet 4 mg  4 mg Oral 4x Daily PRN Alejandrina Barnett DO       • oxyCODONE-acetaminophen (PERCOCET) 5-325 MG per tablet 1 tablet  1 tablet Oral Q6H PRN Telly Rodriguez MD   1 tablet at 03/06/19 0811   • pantoprazole (PROTONIX) EC tablet 40 mg  40 mg Oral Daily Alejandrina Barnett DO   40 mg at 03/06/19 0822   • rosuvastatin (CRESTOR) tablet 40 mg  40 mg Oral Daily Alejandrina Barnett DO   40 mg at 03/06/19 0821   • sodium chloride 0.9 % flush 3 mL  3 mL Intravenous Q12H Alejandrina Barnett DO   3 mL at 03/06/19 0820   • sodium chloride 0.9 % flush 3-10 mL  3-10 mL Intravenous PRN Alejandrina Barnett DO   10 mL at 03/05/19 0618       Review of Systems:   -A 14 point review of systems is completed and is negative except for previous confusion      Objective      Vital Signs  Temp:  [97.9 °F (36.6 °C)-98.1 °F (36.7 °C)] 97.9 °F (36.6 °C)  Heart Rate:  [70-90] 80  Resp:  [16-20] 18  BP: ()/(49-63) 120/63    Physical Exam:    General Exam:  Head:  Normal cephalic, atraumatic  HEENT:  Neck supple  Fundoscopic Exam:  No signs of disc edema  CVS:  Regular rate and rhythm.  No murmurs  Carotid Examination:  No bruits  Lungs:  Clear to auscultation  Abdomen:  Non-tender, Non-distended  Extremities:  No signs of peripheral edema  Skin:  No rashes    Neurologic Exam:    Mental Status:    -Awake, Alert, Oriented X 3  -No word finding difficulties  -No aphasia  -No dysarthria  -Follows simple and complex commands    CN II:  Visual fields full.  Pupils equally reactive to light  CN III, IV, VI:  Extraocular Muscles full with no signs of nystagmus  CN V:  Facial sensory is symmetric with no asymmetries.  CN VII:  Facial motor symmetric  CN VIII:  Gross hearing intact bilaterally  CN IX:  Palate  elevates symmetrically  CN X:  Palate elevates symmetrically  CN XI:  Shoulder shrug symmetric  CN XII:  Tongue is midline on protrusion    Motor: (strength out of 5:  1= minimal movement, 2 = movement in plane of gravity, 3 = movement against gravity, 4 = movement against some resistance, 5 = full strength)    -Right Upper Ext: Proximal: 5 Distal: 5  -Left Upper Ext: Proximal: 5 Distal: 5    -Right Lower Ext: Proximal: 5 Distal: 5  -Left Lower Ext: Proximal: 5 Distal: 5    DTR:  -Right   Bicep: 2+ Tricep: 2+ Brachioradialis: 2+   Patella: 2+ Ankle: 2+ Neg Babinski  -Left   Bicep: 2+ Tricep: 2+ Brachioradialis: 2+   Patella: 2+ Ankle: 2+ Neg Babinski    Sensory:  -Intact to light touch, pinprick, temperature, pain, and proprioception    Coordination:  -Finger to nose intact  -Heel to shin intact  -No ataxia    Gait  -He is able to stand without assistance.  He ambulates around the room with minimal assistance.  He still is somewhat unsteady on his feet.       Results Review:    I reviewed the patient's new clinical results.    Results from last 7 days   Lab Units 03/06/19  0500 03/05/19  0544 03/04/19  0531   WBC 10*3/mm3 9.18 7.60 7.19   HEMOGLOBIN g/dL 12.6* 12.6* 11.8*   HEMATOCRIT % 36.8* 37.2* 35.5*   PLATELETS 10*3/mm3 237 237 227        Results from last 7 days   Lab Units 03/06/19  0500 03/05/19  0544 03/04/19  0531   SODIUM mmol/L 135 135 136   POTASSIUM mmol/L 4.1 4.1 4.5   CHLORIDE mmol/L 101 97* 98   CO2 mmol/L 27.0 31.0 29.0   BUN mg/dL 19 20 17   CREATININE mg/dL 1.20 1.29 1.48*   CALCIUM mg/dL 8.7 8.6 9.2   BILIRUBIN mg/dL 0.7 0.7 0.8   ALK PHOS U/L 68 65 61   ALT (SGPT) U/L 22 22 25   AST (SGOT) U/L 17 24 30   GLUCOSE mg/dL 164* 266* 279*        Lab Results   Component Value Date    PHOS 4.9 (H) 03/04/2019    MG 1.7 03/04/2019    PROTIME 13.1 03/03/2019    INR 0.96 03/03/2019     No components found for: POCGLUC  No components found for: A1C  Lab Results   Component Value Date    HDL 29 (L)  03/05/2019    LDL 89 03/05/2019     No components found for: B12  Lab Results   Component Value Date    TSH 0.751 03/04/2019     EEG reviewed by me and is unremarkable  CT Head without contrast reviewed by me:  No acute findings.      Assessment/Plan     Hospital Problem List      CAD (coronary artery disease)    Diabetes mellitus (CMS/HCC)    Chronic kidney disease, stage III (moderate) (CMS/[facility])    Abdominal pain    Altered mental status    Impression:  Metabolic encephalopathy likely from hyperglycemia  Acute medical delirium  Autonomic neuropathy    Plan:  · EEG - normal  · B12 - normal  · Folate - normal  · Strive for normal glycemic levels  · May consider Northera as an outpatient  · Recommend referral to autonomic clinic in [place].              Steven Aviles MD  03/06/19  10:14 AM

## 2019-03-06 NOTE — PLAN OF CARE
Problem: Patient Care Overview  Goal: Plan of Care Review  Outcome: Ongoing (interventions implemented as appropriate)   03/06/19 1205 03/06/19 1537   Coping/Psychosocial   Plan of Care Reviewed With patient --    Plan of Care Review   Progress improving --    OTHER   Outcome Summary --  Pt A&Ox4. Pt forgetful at times. Accu check ACHS. SSI. Pt c/o R abdominal pain, PRN pain medication given. Pt VSS. D/c home tonight. Safety maintained.       Problem: Fall Risk (Adult)  Goal: Identify Related Risk Factors and Signs and Symptoms  Outcome: Ongoing (interventions implemented as appropriate)    Goal: Absence of Fall  Outcome: Ongoing (interventions implemented as appropriate)      Problem: Confusion, Acute (Adult)  Goal: Cognitive/Functional Impairments Minimized  Outcome: Ongoing (interventions implemented as appropriate)    Goal: Safety  Outcome: Ongoing (interventions implemented as appropriate)

## 2019-03-06 NOTE — PLAN OF CARE
Problem: Patient Care Overview  Goal: Plan of Care Review  Outcome: Ongoing (interventions implemented as appropriate)   03/06/19 1205   Coping/Psychosocial   Plan of Care Reviewed With patient   Plan of Care Review   Progress improving   OTHER   Outcome Summary Pt sitting up in chair. Was slow to respond/process but answered all questions appropriately. sit-stand supervision Pt amb 200'x2 HH cga w/ no LOB pt was able to up/down 3 steps cga/sba.

## 2019-03-06 NOTE — PLAN OF CARE
Problem: Patient Care Overview  Goal: Plan of Care Review  Outcome: Ongoing (interventions implemented as appropriate)   03/06/19 0022   Coping/Psychosocial   Plan of Care Reviewed With patient   Plan of Care Review   Progress improving   OTHER   Outcome Summary gait unsteady. continues to c/o pain. no BM this shift. up with SBA. A&O given shower per wife.      Goal: Individualization and Mutuality  Outcome: Ongoing (interventions implemented as appropriate)    Goal: Discharge Needs Assessment  Outcome: Ongoing (interventions implemented as appropriate)    Goal: Interprofessional Rounds/Family Conf  Outcome: Ongoing (interventions implemented as appropriate)      Problem: Fall Risk (Adult)  Goal: Identify Related Risk Factors and Signs and Symptoms  Outcome: Ongoing (interventions implemented as appropriate)    Goal: Absence of Fall  Outcome: Ongoing (interventions implemented as appropriate)      Problem: Confusion, Acute (Adult)  Goal: Cognitive/Functional Impairments Minimized  Outcome: Ongoing (interventions implemented as appropriate)    Goal: Safety  Outcome: Ongoing (interventions implemented as appropriate)

## 2019-03-07 ENCOUNTER — READMISSION MANAGEMENT (OUTPATIENT)
Dept: CALL CENTER | Facility: HOSPITAL | Age: 64
End: 2019-03-07

## 2019-03-07 NOTE — PROGRESS NOTES
Continued Stay Note  [facility]     Patient Name: Erick Luong  MRN: [id number]  Today's Date: 3/7/2019    Admit Date: 3/3/2019    Discharge Plan     Row Name 03/07/19 1425       Plan    Final Discharge Disposition Code  01 - home or self-care    Final Note  Patient discharged home with no discharge referrals.        Discharge Codes    No documentation.       Expected Discharge Date and Time     Expected Discharge Date Expected Discharge Time    Mar 6, 2019             SUZIE Hernandez

## 2019-03-07 NOTE — OUTREACH NOTE
Prep Survey      Responses   Facility patient discharged from?  [facility]   Is patient eligible?  Yes   Discharge diagnosis  Altered mental status, Abdominal pain   Does the patient have one of the following disease processes/diagnoses(primary or secondary)?  Other   Does the patient have Home health ordered?  No   Is there a DME ordered?  No   Prep survey completed?  Yes          Krystina Dodge RN

## 2019-03-07 NOTE — THERAPY DISCHARGE NOTE
Acute Care - Physical Therapy Discharge Summary  [facility]       Patient Name: Erick Luong  : 1955  MRN: [id number]    Today's Date: 3/7/2019  Onset of Illness/Injury or Date of Surgery: 19    Date of Referral to PT: 19  Referring Physician: Dr. Rodriguez      Admit Date: 3/3/2019      PT Recommendation and Plan    Visit Dx:    ICD-10-CM ICD-9-CM   1. Altered mental status, unspecified altered mental status type R41.82 780.97   2. Confusion R41.0 298.9   3. Hyperglycemia R73.9 790.29   4. Impaired functional mobility, balance, and endurance Z74.09 V49.89   5. Coronary artery disease involving native coronary artery of native heart without angina pectoris I25.10 414.01   6. Other specified diabetes mellitus with complication, with long-term current use of insulin (CMS/HCC) E13.8 250.90    Z79.4 V58.67   7. Chronic kidney disease, stage III (moderate) (CMS/[facility]) N18.3 585.3   8. Abdominal pain, unspecified abdominal location R10.9 789.00   9. Lower abdominal pain R10.30 789.09   10. SIRS (systemic inflammatory response syndrome) (CMS/[facility]) R65.10 995.90   11. Fever, unknown origin R50.9 780.60   12. Viral gastroenteritis A08.4 008.8   13. Chronic diastolic congestive heart failure (CMS/[facility]) I50.32 428.32     428.0   14. Essential hypertension I10 401.9   15. Sleep apnea, unspecified type G47.30 780.57   16. Arthritis M19.90 716.90   17. Mixed hyperlipidemia E78.2 272.2   18. Shortness of breath R06.02 786.05   19. Idiopathic acute pancreatitis without infection or necrosis K85.00 577.0   20. Chest pain, non-cardiac R07.89 786.59   21. Obesity, unspecified obesity severity, unspecified obesity type E66.9 278.00   22. HTN (hypertension), benign I10 401.1   23. Epigastric pain R10.13 789.06   24. Type 2 diabetes mellitus with hyperglycemia, with long-term current use of insulin (CMS/HCC) E11.65 250.00    Z79.4 790.29     V58.67   25. Pleuritic chest pain R07.81 786.52   26. Slow transit constipation  K59.01 564.01   27. Nausea and vomiting, intractability of vomiting not specified, unspecified vomiting type R11.2 787.01   28. Nonsmoker Z78.9 V49.89   29. Orthostatic hypotension I95.1 458.0   30. Constipation, unspecified constipation type K59.00 564.00   31. Ischemic colitis (CMS/[facility]) K55.9 557.9       Outcome Measures     Row Name 03/06/19 1200 03/05/19 1438          How much help from another person do you currently need...    Turning from your back to your side while in flat bed without using bedrails?  4  -NW  4  -MS     Moving from lying on back to sitting on the side of a flat bed without bedrails?  4  -NW  4  -MS     Moving to and from a bed to a chair (including a wheelchair)?  3  -NW  3  -MS     Standing up from a chair using your arms (e.g., wheelchair, bedside chair)?  3  -NW  3  -MS     Climbing 3-5 steps with a railing?  3  -NW  3  -MS     To walk in hospital room?  3  -NW  3  -MS     AM-PAC 6 Clicks Score  20  -NW  20  -MS        Functional Assessment    Outcome Measure Options  AM-PAC 6 Clicks Basic Mobility (PT)  -NW  AM-PAC 6 Clicks Basic Mobility (PT)  -MS       User Key  (r) = Recorded By, (t) = Taken By, (c) = Cosigned By    Initials Name Provider Type    MS Dillon Claire R, PT, DPT, NCS Physical Therapist    NW Johanny Chatterjee, PTA Physical Therapy Assistant            Therapy Suggested Charges     Code   Minutes Charges    93416 (CPT®) Hc Pt Neuromusc Re Education Ea 15 Min      20967 (CPT®) Hc Pt Ther Proc Ea 15 Min      93492 (CPT®) Hc Gait Training Ea 15 Min 23 2    45906 (CPT®) Hc Pt Therapeutic Act Ea 15 Min      22543 (CPT®) Hc Pt Manual Therapy Ea 15 Min      60044 (CPT®) Hc Pt Iontophoresis Ea 15 Min      39246 (CPT®) Hc Pt Elec Stim Ea-Per 15 Min      26393 (CPT®) Hc Pt Ultrasound Ea 15 Min      16418 (CPT®) Hc Pt Self Care/Mgmt/Train Ea 15 Min      48095 (CPT®) Hc Pt Prosthetic (S) Train Initial Encounter, Each 15 Min      03117 (CPT®) Hc Pt Orthotic(S)/Prosthetic(S) Encounter,  Each 15 Min      02569 (CPT®) Hc Orthotic(S) Mgmt/Train Initial Encounter, Each 15min      Total  23 2          Rehab Goal Summary     Row Name 03/07/19 1205             Physical Therapy Goals    Gait Training Goal Selection (PT)  gait training, PT goal 1  -NW      Balance Goal Selection (PT)  balance, PT goal 1;balance, PT goal 2  -NW         Gait Training Goal 1 (PT)    Activity/Assistive Device (Gait Training Goal 1, PT)  gait (walking locomotion);backward stepping;decrease fall risk;increase endurance/gait distance;sidestepping decrease fear of fallng  -NW      [place] Level (Gait Training Goal 1, PT)  independent  -NW      Distance (Gait Goal 1, PT)  200ft  -NW      Time Frame (Gait Training Goal 1, PT)  long term goal (LTG);by discharge  -NW      Progress/Outcome (Gait Training Goal 1, PT)  goal not met  -NW         Balance Goal 1 (PT)    Activity/Assistive Device (Balance Goal 1, PT)  standing, static tandem stance with EC  -NW      [place] Level/Cues Needed (Balance Goal 1, PT)  independent 30 sec  -NW      Time Frame (Balance Goal 1, PT)  long term goal (LTG);by discharge  -NW      Progress/Outcomes (Balance Goal 1, PT)  goal not met  -NW         Balance Goal 2 (PT)    Activity/Assistive Device (Balance Goal 2, PT)  standing, dynamic reaching in all directions  -NW      [place] Level (Balance Goal 2, PT)  independent  -NW      Time Frame (Balance Goal 2, PT)  long term goal (LTG);by discharge  -NW      Progress/Outcome (Balance Goal 2, PT)  goal not met  -NW        User Key  (r) = Recorded By, (t) = Taken By, (c) = Cosigned By    Initials Name Provider Type Discipline    NW Johanny Chatterjee PTA Physical Therapy Assistant PT          Therapy Charges for Today     Code Description Service Date Service Provider Modifiers Qty    59040675907 HC GAIT TRAINING EA 15 MIN 3/6/2019 Johanny Chatterjee PTA GP 2          PT Discharge Summary  Anticipated Discharge Disposition (PT): home  Reason for  Discharge: Discharge from facility  Outcomes Achieved: Refer to plan of care for updates on goals achieved  Discharge Destination: Home      Johanny Chatterjee, PTA   3/7/2019

## 2019-03-07 NOTE — PAYOR COMM NOTE
"[place] [facility] 3-6-19  [id number]    Erick Luong (63 y.o. Male)     Date of Birth Social Security Number Address Home Phone MRN    1955  [address]  [place] [address] [phone] [id number]    Jainism Marital Status          Orthodox        Admission Date Admission Type Admitting Provider Attending Provider Department, Room/Bed    3/3/19 Alejandrina Duckworth DO  [facility] 3A, 338/1    Discharge Date Discharge Disposition Discharge Destination        3/6/2019 Home or Self Care              Attending Provider:  (none)   Allergies:  Bactrim [Sulfamethoxazole-trimethoprim]    Isolation:  None   Infection:  None   Code Status:  Prior    Ht:  182.9 cm (72\")   Wt:  137 kg (303 lb)    Admission Cmt:  None   Principal Problem:  None                Active Insurance as of 3/3/2019     Primary Coverage     Payor Plan Insurance Group Employer/Plan Group    Pending sale to [employer] [id number]     Payor Plan Address Payor Plan Phone Number Payor Plan Fax Number Effective Dates    [address] [phone]  1/1/2018 - None Entered    [facility] [id number]       Subscriber Name Subscriber Birth Date Member ID       ZAINAB LUONG [birth year]1970 [id number]           Secondary Coverage     Payor Plan Insurance Group Employer/Plan Group    MEDICARE MEDICARE A & B      Payor Plan Address Payor Plan Phone Number Payor Plan Fax Number Effective Dates    [address] [phone]  7/1/2013 - None Entered    [facility] [id number]       Subscriber Name Subscriber Birth Date Member ID       ERICK LUONG 1955 [id number]                 Emergency Contacts      (Rel.) Home Phone Work Phone Mobile Phone    Zainab Luong (Spouse) [phone]               Discharge Summary      Telly Rodriguez MD at 3/6/2019  3:18 PM              [facility] Medicine Services  DISCHARGE SUMMARY       Date of Admission: " 3/3/2019  Date of Discharge:  3/6/2019  Primary Care Physician: Del Shetty MD    Presenting Problem/History of Present Illness:  Altered mental status, unspecified altered mental status type [R41.82]  Altered mental status, unspecified altered mental status type [R41.82]     Final Discharge Diagnoses:  Active Hospital Problems    Diagnosis   • Altered mental status   • Abdominal pain   • Chronic kidney disease, stage III (moderate) (CMS/[facility])   • CAD (coronary artery disease)   • Diabetes mellitus (CMS/[facility])       Consults: Neurology    Pertinent Test Results:   IMPRESSION: MRI of the brain  1. No acute intracranial normality, mild cortical atrophy and minimal  chronic small vessel white matter ischemic changes are identified.  2. Mucous retention cyst in the right maxillary sinus.    Summary: Ultrasound the kidneys  1. 3 cm right renal cyst.    Carotid duplex bilateral-minimum stenosis bilateral.    Summary: Ultrasound the kidneys  1. 3 cm right renal cyst.    IMPRESSION: CT scan abdomen pelvic  1. Linear atelectasis in the lung bases. Atheromatous disease of the  aortoiliac vessels and coronary arteries.  2. Prior cholecystectomy.  3. Fatty atrophy of the pancreas.  4. Bilateral renal lesions are not fully characterized without contrast.  The largest is on the right measuring 3.3 cm with a Hounsfield unit  measurement of 41. This may represent a complex cyst or solid lesion.  Consider further evaluation with renal mass protocol CT or MRI.  5. No small bowel distention. Moderate stool in the colon.  6. Small fat-containing right inguinal hernia.    Chief Complaint on Day of Discharge: Weakness    History of Present Illness on Day of Discharge:  Per wife onset of confusion last PM.  Talking to the dead.  Mistook wife for sister.  Did slowly improve and was felt to resolve but was restless all night getting up and down.  He go up this AM and had similar confusion.  Did have a falling out episode while urinating  but per wife this is not uncommon due to an autonomic disturbance he has while urinating.   He sees Dr Lomas for this.  Mental status waxed and waned all day and finally she brought him to the ER.  Has not seen neurology for symptoms.     Hospital Course:  The patient is a 63 y.o. male who presented to [facility] with altered mental status/hallucinations/falling.      Altered mental status/hallucination.  Improving.  Consult neurology.  CT scan head shows no acute changes.  EEG-normal.  B12 and folic-normal.  MRI of the head - no acute intracranial normality.  Carotid duplex- minimal stenosis bilateral. Metabolic encephalopathy likely from hyperglycemia.  Patient fasting glucose today is 160 this morning.  Sugars doing better.  Dizziness/blurry vision have resolved.  Acute delirium resolved.  Patient denied any suicidal or homicidal thoughts.  Currently patient denies of any visual or auditory hallucination.    Autonomic neuropathy/hypotension.  Patient continue Midodrin.  Patient follow-up with primary care doctor for recommendation refer to autonomic clinic in [place].  Discussed with patient possible medication of Nocera, but is very expensive.     Diabetes.  High Sliding scale. Increase Levemir.  [id number] 8.7.  Patient go back on home insulin of insulin.  Patient follow-up with his primary care physician for better control.     CAD/hyperlipidemia.  Patient to continue aspirin.  Patient continue Bumex prn per pt. patient to continue metoprolol prn per patient.  Patient to continue Crestor.  Echocardiogram 1/18/18 ejection fraction 56%, moderate concentric hypertrophy, no hemodynamically significant valvular abnormality.     Chronic kidney disease stage III.  Creatinine 1.2.  GFR is 61.  Pt see Dr. Lomas as outpatient.      Right upper quadrant pain.    Lipase and amylase negative.  CT scan abdomen pelvic- atelectasis lung base atheromatous disease of the aortic iliac vessels and coronary arteries,  "prior cholecystectomy, fatty atrophy of the pancreas, bilateral renal lesion-right measuring 3.3 cm-may present complexes versus solid lesion-consider further evaluation with renal mass protocol CT or MRI, moderate stool in the colon, small fatty containing right inguinal hernia.     CT scan - renal mass versus complex cyst.  Ultrasound of the kidneys shows 3 cm right renal cyst.     Chronic pain.  Patient to continue Percocet.  Drug screen was negative.     Reflux.  Patient to continue Protonix.     Chronic constipation.  Patient to continue lactulose, and Citrucel.     Depression/anxiety/insomnia.   Patient continue Cymbalta.       Gross morbid obesity.  BMI is 41.  Diet and exercise has been discussed with patient.     Deconditioning.  PT and OT consulted.  Patient continue with outpatient physical therapy.     Vital signs stable, afebrile.  Acute delirium resolved.  Patient follow-up with outpatient physical therapy.  Patient follow with primary care physician in 1 week.     Condition on Discharge:  stable    Physical Exam on Discharge:  /60 (BP Location: Right arm, Patient Position: Sitting)   Pulse 90   Temp 97.9 °F (36.6 °C) (Oral)   Resp 18   Ht 182.9 cm (72\")   Wt (!) 137 kg (303 lb)   SpO2 97%   BMI 41.09 kg/m²    Physical Exam  Constitutional: He is oriented to person, place, and time. He appears well-developed.   HENT:   Head: Normocephalic and atraumatic.   Eyes: Conjunctivae are normal. Pupils are equal, round, and reactive to light.   Neck: Neck supple. No JVD present. No thyromegaly present.   Cardiovascular: Normal rate, regular rhythm, normal heart sounds and intact distal pulses. Exam reveals no gallop and no friction rub.   No murmur heard.  Pulmonary/Chest: Effort normal and breath sounds normal. No respiratory distress. He has no wheezes. He has no rales. He exhibits no tenderness.   Abdominal: Soft. Bowel sounds are normal. He exhibits no distension. There is right upper " quadrant tenderness.  No guarding. Gross morbid obesity.   Musculoskeletal: He exhibits no edema, tenderness or deformity.   Lymphadenopathy:     He has no cervical adenopathy.   Neurological: He is alert and oriented to person, place, and time. He displays normal reflexes. No cranial nerve deficit. He exhibits abnormal muscle tone. Coordination abnormal.   Skin: Skin is warm and dry. Capillary refill takes 2 to 3 seconds. No rash noted.   Psychiatric: He has a normal mood and affect. His behavior is normal.   Nursing note and vitals reviewed.    Discharge Disposition: Home with wife.  Outpatient physical therapy.  Home or Self Care    Discharge Medications:     Discharge Medications      Changes to Medications      Instructions Start Date   bumetanide 2 MG tablet  Commonly known as:  BUMEX  What changed:    · how much to take  · when to take this  · reasons to take this   2 mg, Oral, 2 Times Daily PRN      oxyCODONE-acetaminophen  MG per tablet  Commonly known as:  PERCOCET  What changed:    · how much to take  · when to take this   0.5 tablets, Oral, 4 Times Daily PRN         Continue These Medications      Instructions Start Date   aspirin 81 MG EC tablet   81 mg, Oral, Daily      CITRUCEL oral powder  Generic drug:  methylcellulose   2 g, Oral, Daily      colchicine 0.6 MG tablet   0.6 mg, Oral, 2 Times Daily PRN      CRESTOR 40 MG tablet  Generic drug:  rosuvastatin   40 mg, Oral, Daily      DULoxetine 60 MG capsule  Commonly known as:  CYMBALTA   60 mg, Oral, Daily      insulin glargine 100 UNIT/ML injection  Commonly known as:  LANTUS   20 Units, Subcutaneous, Nightly      insulin regular 500 UNIT/ML CONCENTRATED injection  Commonly known as:  humuLIN R   100 Units, Subcutaneous, 3 Times Daily Before Meals, Packaging states 100 units with regular meals; 120 units with large meals or 360 units daily      irbesartan 300 MG tablet  Commonly known as:  AVAPRO   300 mg, Oral, Nightly      lactulose 10  GM/15ML solution  Commonly known as:  CHRONULAC   20 g, Oral, 3 Times Daily      midodrine 10 MG tablet  Commonly known as:  PROAMATINE   10 mg, Oral, 3 Times Daily      ondansetron ODT 4 MG disintegrating tablet  Commonly known as:  ZOFRAN-ODT   4 mg, Oral, 4 Times Daily PRN      pantoprazole 40 MG EC tablet  Commonly known as:  PROTONIX   40 mg, Oral, Daily         Stop These Medications    isosorbide mononitrate 30 MG 24 hr tablet  Commonly known as:  IMDUR     linaclotide 290 MCG capsule capsule  Commonly known as:  LINZESS     metoprolol succinate XL 25 MG 24 hr tablet  Commonly known as:  TOPROL-XL            Discharge Diet:   Diet Instructions     Advance Diet As Tolerated            Activity at Discharge:   Activity Instructions     Activity as Tolerated            Discharge Care Plan/Instructions: Patient physical therapy.    Follow-up Appointments:   Future Appointments   Date Time Provider Department Center   3/12/2019  1:00 PM [facility]     Follow-up with PCP 1 week.    Telly Rodriguez MD  03/06/19  3:45 PM    Time: Greater than 30 minutes.        Electronically signed by Telly Rodriguez MD at 3/6/2019  3:45 PM

## 2019-03-08 ENCOUNTER — HOSPITAL ENCOUNTER (OUTPATIENT)
Dept: PHYSICAL THERAPY | Facility: HOSPITAL | Age: 64
Setting detail: THERAPIES SERIES
Discharge: HOME | End: 2019-03-08

## 2019-03-08 DIAGNOSIS — M19.90 ARTHRITIS: ICD-10-CM

## 2019-03-08 DIAGNOSIS — G90.9 AUTONOMIC NERVOUS SYSTEM DISORDER: ICD-10-CM

## 2019-03-08 DIAGNOSIS — R41.82 ALTERED MENTAL STATUS, UNSPECIFIED ALTERED MENTAL STATUS TYPE: ICD-10-CM

## 2019-03-08 DIAGNOSIS — R26.9 ABNORMALITY OF GAIT AND MOBILITY: Primary | ICD-10-CM

## 2019-03-08 PROCEDURE — 97162 PT EVAL MOD COMPLEX 30 MIN: CPT | Performed by: PHYSICAL THERAPIST

## 2019-03-09 NOTE — THERAPY EVALUATION
.Outpatient Physical Therapy Neuro Initial Evaluation  [facility]     Patient Name: Erick Luong  : 1955  MRN: [id number]  Today's Date: 3/8/2019      Visit Date: 2019    Patient Active Problem List   Diagnosis   • Lower abdominal pain   • SIRS (systemic inflammatory response syndrome) (CMS/HCC)   • Fever, unknown origin   • Viral gastroenteritis   • Chronic diastolic congestive heart failure (CMS/HCC)   • CAD (coronary artery disease)   • Diabetes mellitus (CMS/HCC)   • Hypertension   • Sleep apnea   • Arthritis   • Mixed hyperlipidemia   • Shortness of breath   • Acute pancreatitis   • Chest pain, non-cardiac   • Obesity, unspecified obesity severity, unspecified obesity type   • HTN (hypertension), benign   • Epigastric pain   • Type 2 diabetes mellitus with hyperglycemia, with long-term current use of insulin (CMS/HCC)   • Pleuritic chest pain   • Slow transit constipation   • Nausea and vomiting   • Chronic kidney disease, stage III (moderate) (CMS/HCC)   • Nonsmoker   • Orthostatic hypotension   • Abdominal pain   • Constipation   • Ischemic colitis (CMS/HCC)   • Altered mental status        Past Medical History:   Diagnosis Date   • Arthritis    • CHF (congestive heart failure) (CMS/HCC)    • Coronary artery disease    • Diabetes mellitus (CMS/HCC)    • Hyperlipidemia    • Hypertension    • Myocardial infarction (CMS/HCC)    • Pancreatitis    • Renal disorder    • Sleep apnea with use of continuous positive airway pressure (CPAP)         Past Surgical History:   Procedure Laterality Date   • ABDOMINAL SURGERY     • APPENDECTOMY     • BACK SURGERY     • CARDIAC SURGERY     • CATARACT EXTRACTION     • CERVICAL SPINE SURGERY     • COLONOSCOPY N/A 2017    Normal exam repeat in 5 years   • COLONOSCOPY N/A 2019    Procedure: COLONOSCOPY WITH ANESTHESIA;  Surgeon: Tyrell Smith MD;  Location: [facility];  Service: Gastroenterology   • COLONOSCOPY W/ POLYPECTOMY  2014     Hyperplastic polyp   • CORONARY ARTERY BYPASS GRAFT  10/2015   • ENDOSCOPY  04/13/2011    Gastritis with hemorrhage   • ENDOSCOPY N/A 5/5/2017    Normal exam   • ENDOSCOPY N/A 2/11/2019    Procedure: ESOPHAGOGASTRODUODENOSCOPY WITH ANESTHESIA;  Surgeon: Tyrell Smith MD;  Location: [facility] ENDOSCOPY;  Service: Gastroenterology   • INCISION AND DRAINAGE OF WOUND Left 09/2007    spider bite         Visit Dx:     ICD-10-CM ICD-9-CM   1. Abnormality of gait and mobility R26.9 781.2   2. Altered mental status, unspecified altered mental status type R41.82 780.97   3. Autonomic nervous system disorder G90.9 337.9   4. Arthritis M19.90 716.90       Patient History     Row Name 03/08/19 1600             History    Chief Complaint  Difficulty with daily activities;Difficulty Walking;Balance Problems  -TB      Date Current Problem(s) Began  01/25/19  -TB      Brief Description of Current Complaint  Erick has a long history of arthritic pain, but over the weekend, he had a severe exacerbation of delerium and was unable to walk. He was told that his DM triggered an autonomic nervous system disorder. He has syncopal episodes when he urinates. His wife, Joan, says that 2 days ago, he was at 0% of his normal self and now, he is about 80% of his normal. He has had multiple falls over the last several weeks. He c/o daily vertigo.   -TB      Previous treatment for THIS PROBLEM  Rehabilitation;Medication  -TB      Patient/Caregiver Goals  Relieve pain;Return to prior level of function;Improve mobility;Improve strength;Relief from dizziness;Know what to do to help the symptoms  -TB      Patient's Rating of General Health  Fair  -TB      Hand Dominance  right-handed  -TB      How has patient tried to help current problem?  acute care PT  -TB         Pain     Pain Location  Back;Neck;Hip;Shoulder  -TB      Pain at Present  6  -TB      Pain Frequency  Constant/continuous  -TB      What Performance Factors Make the Current  Problem(s) WORSE?  most any movement, car transfers, walking  -TB      Pain Comments  pain in primarily in tang hips, lumbar and neck with headaches  -TB         Fall Risk Assessment    Any falls in the past year:  Yes  -TB      Number of falls reported in the last 12 months  10  -TB         Services    Prior Rehab/Home Health Experiences  Yes  -TB      When was the prior experience with Rehab/Home Health  during stay in acute care  -TB      Where was the prior experience with Rehab/Home Health  [facility]  -TB         Daily Activities    Primary Language  English  -TB      How does patient learn best?  Listening;Reading;Demonstration  -TB      Teaching needs identified  Home Exercise Program;Management of Condition;Falls Prevention  -TB      Patient is concerned about/has problems with  Difficulty with self care (i.e. bathing, dressing, toileting:;Performing home management (household chores, shopping, care of dependents);Walking  -TB      Does patient have problems with the following?  None  -TB      Barriers to learning  Hearing;Cognitive  -TB      Pt Participated in POC and Goals  Yes  -TB         Safety    Are you being hurt, hit, or frightened by anyone at home or in your life?  No  -TB      Are you being neglected by a caregiver  No  -TB        User Key  (r) = Recorded By, (t) = Taken By, (c) = Cosigned By    Initials Name Provider Type    TB Rigo Escudero, PT Physical Therapist            PT Ortho     Row Name 03/08/19 1800       Posture/Observations    Posture/Observations Comments  very kyphotis thoracic spine with forward head/shoulders  -TB       Quarter Clearing    Quarter Clearing  Lower Quarter Clearing  -TB       DTR- Lower Quarter Clearing    Patellar tendon (L2-4)  Bilateral:;1- Minimal response  -TB       Myotomal Screen- Lower Quarter Clearing    Hip flexion (L2)  Bilateral:;4 (Good)  -TB    Knee extension (L3)  Bilateral:;4 (Good)  -TB    Ankle DF (L4)  Bilateral:;4- (Good -)  -TB    Great toe  extension (L5)  Bilateral:;4- (Good -)  -TB    Ankle PF (S1)  Bilateral:;4- (Good -)  -TB    Knee flexion (S2)  Bilateral:;4 (Good)  -TB       General ROM    Head/Neck/Trunk  Neck Extension;Trunk Extension  -TB    RT Upper Ext  Rt Shoulder Flexion  -TB    LT Upper Ext  Lt Shoulder Flexion  -TB    RT Lower Ext  Rt Hip Extension;Rt Hip Flexion;Rt Hip External Rotation;Rt Hip Internal Rotation  -TB    LT Lower Ext  Lt Hip Extension;Lt Hip Flexion;Lt Hip External Rotation;Lt Hip Internal Rotation  -TB       Head/Neck/Trunk    Neck Extension AROM  25% with pain  -TB    Trunk Extension AROM  unable to rise to neutral  -TB       Right Upper Ext    Rt Shoulder Flexion AROM  80 deg  -TB       Left Upper Ext    Lt Shoulder Flexion AROM  80 deg  -TB       Right Lower Ext    Rt Hip Extension PROM  0 deg  -TB    Rt Hip Flexion PROM  90 with hip pain  -TB    Rt Hip External Rotation PROM  30 deg  -TB    Rt Hip Internal Rotation PROM  0 deg  -TB       Left Lower Ext    Lt Hip Extension PROM  0 deg  -TB    Lt Hip Flexion PROM  90 with pain  -TB    Lt Hip External Rotation PROM  30 deg  -TB    Lt Hip Internal Rotation PROM  0 deg  -TB       MMT (Manual Muscle Testing)    Rt Lower Ext  Rt Hip ABduction  -TB    Lt Lower Ext  Lt Hip ABduction  -TB       MMT Right Lower Ext    Rt Hip ABduction MMT, Gross Movement  (4-/5) good minus  -TB    Rt Lower Extremity Comments   pain with MMT  -TB       MMT Left Lower Ext    Lt Hip ABduction MMT, Gross Movement  (4-/5) good minus  -TB    Lt Lower Extremity Comments   pain with MMT  -TB       Sensation    Light Touch  Partial deficits in the LUE;Partial deficits in the LLE worse sensation in feet up to ankles  -TB       Pathomechanics    Upper Extremity Pathomechanics  Excessive scapular elevation;Limited thoracic extension  -TB    Lower Extremity Pathomechanics  Trendelenburg with midstance;Excessive hip external rotation;Antalgic with midstance  -TB    Spine Pathomechanics  Bends knees with  attempted lumbar extension;Hinges into extension at one segment in lumbar;Limited lumbar flattening with forward bend;Excessive thoracic kyphosis with forward bend;Hinges into extension in lower cervical;Limited upper thoracic motion with cervical ROM  -TB       Balance Skills Training    Rhomberg  able to hold x 10 sec with eyes closed but very fearful  -TB    Sharpened Rhomberg  unable to hold tandem without asst EO   -TB       Transfers    Comment (Transfers)  needed asst to move from supine to sit; struggles transferring out of a lower car; unable to get out of the floor to stand without asst  -TB       Gait/Stairs Assessment/Training    Comment (Gait/Stairs)  walks with CGA; tends to look down at the floor with a wide KIRSTEN and increased lateral sway  -TB      User Key  (r) = Recorded By, (t) = Taken By, (c) = Cosigned By    Initials Name Provider Type    TB Rigo Escudero, PT Physical Therapist                    Therapy Education  Education Details: W's, used rwx at home; walk tall with short steps  Given: HEP, Symptoms/condition management, Pain management, Posture/body mechanics, Fall prevention and home safety  Program: New  How Provided: Verbal, Demonstration  Provided to: Patient, Caregiver  Level of Understanding: Teach back education performed, Verbalized    PT OP Goals     Row Name 03/08/19 1600          PT Short Term Goals    STG Date to Achieve  04/05/19  -TB     STG 1  Improve tang hip passive extension to 10 deg  -TB     STG 1 Progress  New  -TB     STG 2  Improve tang hip flexion to 110 deg  -TB     STG 2 Progress  New  -TB     STG 3  Improve posture to more erect with neutral neck alignment  -TB     STG 3 Progress  New  -TB     STG 4  Able to transfer out of bed and to standing independently  -TB     STG 4 Progress  New  -TB        Long Term Goals    LTG Date to Achieve  05/03/19  -TB     LTG 1  Able to walk with upright posture without assistive device without lateral sway   -TB     LTG 1  Progress  New  -TB     LTG 2  Reports no falls or near falls x 1 weeks  -TB     LTG 2 Progress  New  -TB     LTG 3  Able to walk short community distances without fatigue  -TB     LTG 3 Progress  New  -TB     LTG 4  Gross tang hip MMT 4+/5  -TB     LTG 4 Progress  New  -TB     LTG 5  Reports no vertigo except minor twinges no more than 1-2/10 in intensity  -TB     LTG 5 Progress  New  -TB        Time Calculation    PT Goal Re-Cert Due Date  04/07/19  -TB       User Key  (r) = Recorded By, (t) = Taken By, (c) = Cosigned By    Initials Name Provider Type    TB Rigo Escudero, PT Physical Therapist          PT Assessment/Plan     Row Name 03/08/19 1600          PT Assessment    Functional Limitations  Impaired gait;Limitations in community activities;Limitations in functional capacity and performance;Performance in leisure activities;Performance in self-care ADL;Limitation in home management  -TB     Impairments  Gait;Balance;Pain;Posture;Range of motion;Muscle strength;Joint mobility;Peripheral nerve integrity;Motor function;Sensation;Impaired flexibility  -TB     Assessment Comments  Erick is showing a myriad of problems that are leading to him struggling with his mobility, balance, and safety. He has a chronic h/o arthritis and limited ROM. He also has a h/o neck pain with headaches. His recent problem appears to have magnified the significance of these other underlying problems to make him very weak with poor balance. He is also having vertigo which may be cervicogenic given his long h/o neck OA and pain but his vestibular may also be sluggish from inactivity and the insult to his neuro system recently. He is comprehending better and his wife says he is about 80% of his normal. I believe he would greatly benefit from PT to improve his balance, pain, and safety.   -TB     Please refer to paper survey for additional self-reported information  Yes  -TB     Rehab Potential  Good  -TB     Patient/caregiver participated  in establishment of treatment plan and goals  Yes  -TB     Patient would benefit from skilled therapy intervention  Yes  -TB        PT Plan    PT Frequency  2x/week  -TB     Predicted Duration of Therapy Intervention (Therapy Eval)  8 weeks  -TB     Planned CPT's?  PT EVAL MOD COMPLELITY: 66646;PT THER PROC EA 15 MIN: 78495;PT THER ACT EA 15 MIN: 95239;PT MANUAL THERAPY EA 15 MIN: 06373;PT GAIT TRAINING EA 15 MIN: 88851;PT ELECTRICAL STIM UNATTEND: ;PT ELECTRICAL STIM ATTD EA 15 MIN: 18857;PT ULTRASOUND EA 15 MIN: 77525  -TB     PT Plan Comments  We will focus early on mobility and flexibility through his hips and spine to allow him to stand more erect and move with better mechanics. We will also work on improving the alignment and mobility of his upper neck and monitor how this affects his vertigo. As this improves, we will progress to more postural and hip/core stability exercises and progress his HEP for the same. We will monitor for s/s of any autonomic problems and keep a close guard on him to prevent falling.   -TB       User Key  (r) = Recorded By, (t) = Taken By, (c) = Cosigned By    Initials Name Provider Type    TB Rigo Escudero PT Physical Therapist                                     Time Calculation:   Start Time: 1600  Stop Time: 1710  Time Calculation (min): 70 min   Therapy Suggested Charges     Code   Minutes Charges    None           Therapy Charges for Today     Code Description Service Date Service Provider Modifiers Qty    54255381926 HC PT EVAL MOD COMPLEXITY 4 3/8/2019 Rigo Escudero, PT GP 1                    Rigo Escudero PT  3/8/2019

## 2019-03-11 ENCOUNTER — READMISSION MANAGEMENT (OUTPATIENT)
Dept: CALL CENTER | Facility: HOSPITAL | Age: 64
End: 2019-03-11

## 2019-03-11 NOTE — OUTREACH NOTE
Medical Week 1 Survey      Responses   Facility patient discharged from?  [facility]   Does the patient have one of the following disease processes/diagnoses(primary or secondary)?  Other   Is there a successful TCM telephone encounter documented?  No   Week 1 attempt successful?  Yes   Call start time  0759   Call end time  0804   Discharge diagnosis  Altered mental status, Abdominal pain   Is patient permission given to speak with other caregiver?  Yes   List who call center can speak with  wife   Meds reviewed with patient/caregiver?  Yes   Is the patient having any side effects they believe may be caused by any medication additions or changes?  No   Does the patient have all medications ordered at discharge?  Yes   Is the patient taking all medications as directed (includes completed medication regime)?  Yes   Comments regarding appointments  Pt stated that he will be going to see MD at [facility] but does not know when.   Does the patient have a primary care provider?   Yes   Does the patient have an appointment with their PCP within 7 days of discharge?  Yes   Has the patient kept scheduled appointments due by today?  Yes   Has home health visited the patient within 72 hours of discharge?  N/A   Psychosocial issues?  No   Did the patient receive a copy of their discharge instructions?  Yes   Nursing interventions  Reviewed instructions with patient   What is the patient's perception of their health status since discharge?  Same   Is the patient/caregiver able to teach back signs and symptoms related to disease process for when to call PCP?  Yes   Is the patient/caregiver able to teach back signs and symptoms related to disease process for when to call 911?  Yes   Is the patient/caregiver able to teach back the hierarchy of who to call/visit for symptoms/problems? PCP, Specialist, Home health nurse, Urgent Care, ED, 911  Yes   Week 1 call completed?  Yes          Claudia Golden RN

## 2019-03-12 ENCOUNTER — HOSPITAL ENCOUNTER (OUTPATIENT)
Dept: PHYSICAL THERAPY | Facility: HOSPITAL | Age: 64
Setting detail: THERAPIES SERIES
Discharge: HOME | End: 2019-03-12

## 2019-03-12 DIAGNOSIS — R26.9 ABNORMALITY OF GAIT AND MOBILITY: Primary | ICD-10-CM

## 2019-03-12 DIAGNOSIS — R41.82 ALTERED MENTAL STATUS, UNSPECIFIED ALTERED MENTAL STATUS TYPE: ICD-10-CM

## 2019-03-12 DIAGNOSIS — G90.9 AUTONOMIC NERVOUS SYSTEM DISORDER: ICD-10-CM

## 2019-03-12 DIAGNOSIS — M19.90 ARTHRITIS: ICD-10-CM

## 2019-03-12 PROCEDURE — 97140 MANUAL THERAPY 1/> REGIONS: CPT | Performed by: PHYSICAL THERAPIST

## 2019-03-12 NOTE — THERAPY TREATMENT NOTE
Outpatient Physical Therapy Ortho Treatment Note  [facility]     Patient Name: Erick Luong  : 1955  MRN: [id number]  Today's Date: 3/12/2019      Visit Date: 2019    Visit Dx:    ICD-10-CM ICD-9-CM   1. Abnormality of gait and mobility R26.9 781.2   2. Altered mental status, unspecified altered mental status type R41.82 780.97   3. Autonomic nervous system disorder G90.9 337.9   4. Arthritis M19.90 716.90       Patient Active Problem List   Diagnosis   • Lower abdominal pain   • SIRS (systemic inflammatory response syndrome) (CMS/[facility])   • Fever, unknown origin   • Viral gastroenteritis   • Chronic diastolic congestive heart failure (CMS/[facility])   • CAD (coronary artery disease)   • Diabetes mellitus (CMS/[facility])   • Hypertension   • Sleep apnea   • Arthritis   • Mixed hyperlipidemia   • Shortness of breath   • Acute pancreatitis   • Chest pain, non-cardiac   • Obesity, unspecified obesity severity, unspecified obesity type   • HTN (hypertension), benign   • Epigastric pain   • Type 2 diabetes mellitus with hyperglycemia, with long-term current use of insulin (CMS/[facility])   • Pleuritic chest pain   • Slow transit constipation   • Nausea and vomiting   • Chronic kidney disease, stage III (moderate) (CMS/[facility])   • Nonsmoker   • Orthostatic hypotension   • Abdominal pain   • Constipation   • Ischemic colitis (CMS/[facility])   • Altered mental status        Past Medical History:   Diagnosis Date   • Arthritis    • CHF (congestive heart failure) (CMS/[facility])    • Coronary artery disease    • Diabetes mellitus (CMS/[facility])    • Hyperlipidemia    • Hypertension    • Myocardial infarction (CMS/[facility])    • Pancreatitis    • Renal disorder    • Sleep apnea with use of continuous positive airway pressure (CPAP)         Past Surgical History:   Procedure Laterality Date   • ABDOMINAL SURGERY     • APPENDECTOMY     • BACK SURGERY     • CARDIAC SURGERY     • CATARACT EXTRACTION     • CERVICAL SPINE SURGERY     • COLONOSCOPY N/A  1/31/2017    Normal exam repeat in 5 years   • COLONOSCOPY N/A 2/11/2019    Procedure: COLONOSCOPY WITH ANESTHESIA;  Surgeon: Tyrell Smith MD;  Location: [facility] ENDOSCOPY;  Service: Gastroenterology   • COLONOSCOPY W/ POLYPECTOMY  03/04/2014    Hyperplastic polyp   • CORONARY ARTERY BYPASS GRAFT  10/2015   • ENDOSCOPY  04/13/2011    Gastritis with hemorrhage   • ENDOSCOPY N/A 5/5/2017    Normal exam   • ENDOSCOPY N/A 2/11/2019    Procedure: ESOPHAGOGASTRODUODENOSCOPY WITH ANESTHESIA;  Surgeon: Tyrell Smith MD;  Location: [facility] ENDOSCOPY;  Service: Gastroenterology   • INCISION AND DRAINAGE OF WOUND Left 09/2007    spider bite                       PT Assessment/Plan     Row Name 03/12/19 0800          PT Assessment    Assessment Comments  Today was his first visit after the eval so no goals met. We focused mostly on neck and hip mobility to improve his posture and mobility to be able to progress later to more balance and strengthening.  -TB        PT Plan    PT Plan Comments  Cont to focus on neck and hips for now.  -TB       User Key  (r) = Recorded By, (t) = Taken By, (c) = Cosigned By    Initials Name Provider Type    TB Rigo Escudero, PT Physical Therapist              Exercises     Row Name 03/12/19 0800             Subjective Comments    Subjective Comments  His wife says his walking is better. He c/o left hip pain today.  -TB         Subjective Pain    Pre-Treatment Pain Level  6  -TB         Total Minutes    33407 - PT Manual Therapy Minutes  46  -TB        User Key  (r) = Recorded By, (t) = Taken By, (c) = Cosigned By    Initials Name Provider Type    Rigo Isidro, PT Physical Therapist                        Manual Rx (last 36 hours)      Manual Treatments     Row Name 03/12/19 0800             Total Minutes    06714 - PT Manual Therapy Minutes  46  -TB         Manual Rx 1    Manual Rx 1 Location  supine subocc release and gentle manual cx traction  -TB      Manual Rx 1 Type  tang C1  PA mobs  -TB      Manual Rx 1 Grade   tang lower cervical manual extension mobs sustained  -TB      Manual Rx 1 Duration  20  -TB         Manual Rx 2    Manual Rx 2 Location  hooklying tang anterior hip STM  -TB      Manual Rx 2 Duration  10 min each  -TB         Manual Rx 3    Manual Rx 3 Location  passive stretch tang hip flexors, piri and OI  -TB      Manual Rx 3 Type  hip flexors stretched off edge of table with gentle OP  -TB      Manual Rx 3 Duration  3 min each  -TB        User Key  (r) = Recorded By, (t) = Taken By, (c) = Cosigned By    Initials Name Provider Type    TB Rigo Escudero, PT Physical Therapist          PT OP Goals     Row Name 03/12/19 0800          PT Short Term Goals    STG Date to Achieve  04/05/19  -TB     STG 1  Improve tang hip passive extension to 10 deg  -TB     STG 1 Progress  Ongoing  -TB     STG 1 Progress Comments  just started working on this  -TB     STG 2  Improve tang hip flexion to 110 deg  -TB     STG 2 Progress  Ongoing  -TB     STG 3  Improve posture to more erect with neutral neck alignment  -TB     STG 3 Progress  Ongoing  -TB     STG 4  Able to transfer out of bed and to standing independently  -TB     STG 4 Progress  Ongoing  -TB        Long Term Goals    LTG Date to Achieve  05/03/19  -TB     LTG 1  Able to walk with upright posture without assistive device without lateral sway   -TB     LTG 1 Progress  New  -TB     LTG 2  Reports no falls or near falls x 1 weeks  -TB     LTG 2 Progress  New  -TB     LTG 3  Able to walk short community distances without fatigue  -TB     LTG 3 Progress  New  -TB     LTG 4  Gross tang hip MMT 4+/5  -TB     LTG 4 Progress  New  -TB     LTG 5  Reports no vertigo except minor twinges no more than 1-2/10 in intensity  -TB     LTG 5 Progress  New  -TB        Time Calculation    PT Goal Re-Cert Due Date  04/07/19  -TB       User Key  (r) = Recorded By, (t) = Taken By, (c) = Cosigned By    Initials Name Provider Type    Rigo Isidro, PT  Physical Therapist          Therapy Education  Given: HEP, Symptoms/condition management, Pain management, Posture/body mechanics, Fall prevention and home safety  Program: New  How Provided: Verbal, Demonstration  Provided to: Patient, Caregiver  Level of Understanding: Teach back education performed, Verbalized              Time Calculation:   Start Time: 0800  Stop Time: 0846  Time Calculation (min): 46 min  Total Timed Code Minutes- PT: 46 minute(s)  Therapy Suggested Charges     Code   Minutes Charges    18796 (CPT®) Hc Pt Neuromusc Re Education Ea 15 Min      33726 (CPT®) Hc Pt Ther Proc Ea 15 Min      93984 (CPT®) Hc Gait Training Ea 15 Min      37884 (CPT®) Hc Pt Therapeutic Act Ea 15 Min      30294 (CPT®) Hc Pt Manual Therapy Ea 15 Min 46 3    88640 (CPT®) Hc Pt Ther Massage- Per 15 Min      54653 (CPT®) Hc Pt Iontophoresis Ea 15 Min      83995 (CPT®) Hc Pt Elec Stim Ea-Per 15 Min      25098 (CPT®) Hc Pt Ultrasound Ea 15 Min      02519 (CPT®) Hc Pt Self Care/Mgmt/Train Ea 15 Min      30329 (CPT®) Hc Pt Prosthetic (S) Train Initial Encounter, Each 15 Min      54158 (CPT®) Hc Orthotic(S) Mgmt/Train Initial Encounter, Each 15min      22930 (CPT®) Hc Pt Aquatic Therapy Ea 15 Min      02177 (CPT®) Hc Pt Orthotic(S)/Prosthetic(S) Encounter, Each 15 Min       (CPT®) Hc Pt Electrical Stim Unattended      Total  46 3        Therapy Charges for Today     Code Description Service Date Service Provider Modifiers Qty    54304554503 HC PT MANUAL THERAPY EA 15 MIN 3/12/2019 Rigo Escudero, PT GP 3                    Rigo Escudero, PT  3/12/2019

## 2019-03-14 ENCOUNTER — HOSPITAL ENCOUNTER (OUTPATIENT)
Dept: PHYSICAL THERAPY | Facility: HOSPITAL | Age: 64
Setting detail: THERAPIES SERIES
Discharge: HOME | End: 2019-03-14

## 2019-03-14 DIAGNOSIS — R41.82 ALTERED MENTAL STATUS, UNSPECIFIED ALTERED MENTAL STATUS TYPE: ICD-10-CM

## 2019-03-14 DIAGNOSIS — R26.9 ABNORMALITY OF GAIT AND MOBILITY: Primary | ICD-10-CM

## 2019-03-14 DIAGNOSIS — M19.90 ARTHRITIS: ICD-10-CM

## 2019-03-14 DIAGNOSIS — G90.9 AUTONOMIC NERVOUS SYSTEM DISORDER: ICD-10-CM

## 2019-03-14 PROCEDURE — 97140 MANUAL THERAPY 1/> REGIONS: CPT | Performed by: PHYSICAL THERAPIST

## 2019-03-14 NOTE — THERAPY TREATMENT NOTE
Outpatient Physical Therapy Ortho Treatment Note  [facility]     Patient Name: Erick Luong  : 1955  MRN: [id number]  Today's Date: 3/14/2019      Visit Date: 2019    Visit Dx:    ICD-10-CM ICD-9-CM   1. Abnormality of gait and mobility R26.9 781.2   2. Altered mental status, unspecified altered mental status type R41.82 780.97   3. Autonomic nervous system disorder G90.9 337.9   4. Arthritis M19.90 716.90       Patient Active Problem List   Diagnosis   • Lower abdominal pain   • SIRS (systemic inflammatory response syndrome) (CMS/[facility])   • Fever, unknown origin   • Viral gastroenteritis   • Chronic diastolic congestive heart failure (CMS/[facility])   • CAD (coronary artery disease)   • Diabetes mellitus (CMS/[facility])   • Hypertension   • Sleep apnea   • Arthritis   • Mixed hyperlipidemia   • Shortness of breath   • Acute pancreatitis   • Chest pain, non-cardiac   • Obesity, unspecified obesity severity, unspecified obesity type   • HTN (hypertension), benign   • Epigastric pain   • Type 2 diabetes mellitus with hyperglycemia, with long-term current use of insulin (CMS/[facility])   • Pleuritic chest pain   • Slow transit constipation   • Nausea and vomiting   • Chronic kidney disease, stage III (moderate) (CMS/[facility])   • Nonsmoker   • Orthostatic hypotension   • Abdominal pain   • Constipation   • Ischemic colitis (CMS/[facility])   • Altered mental status        Past Medical History:   Diagnosis Date   • Arthritis    • CHF (congestive heart failure) (CMS/[facility])    • Coronary artery disease    • Diabetes mellitus (CMS/[facility])    • Hyperlipidemia    • Hypertension    • Myocardial infarction (CMS/[facility])    • Pancreatitis    • Renal disorder    • Sleep apnea with use of continuous positive airway pressure (CPAP)         Past Surgical History:   Procedure Laterality Date   • ABDOMINAL SURGERY     • APPENDECTOMY     • BACK SURGERY     • CARDIAC SURGERY     • CATARACT EXTRACTION     • CERVICAL SPINE SURGERY     • COLONOSCOPY N/A  1/31/2017    Normal exam repeat in 5 years   • COLONOSCOPY N/A 2/11/2019    Procedure: COLONOSCOPY WITH ANESTHESIA;  Surgeon: Tyrell Smith MD;  Location: [facility] ENDOSCOPY;  Service: Gastroenterology   • COLONOSCOPY W/ POLYPECTOMY  03/04/2014    Hyperplastic polyp   • CORONARY ARTERY BYPASS GRAFT  10/2015   • ENDOSCOPY  04/13/2011    Gastritis with hemorrhage   • ENDOSCOPY N/A 5/5/2017    Normal exam   • ENDOSCOPY N/A 2/11/2019    Procedure: ESOPHAGOGASTRODUODENOSCOPY WITH ANESTHESIA;  Surgeon: Tyrell Smith MD;  Location: [facility] ENDOSCOPY;  Service: Gastroenterology   • INCISION AND DRAINAGE OF WOUND Left 09/2007    spider bite                       PT Assessment/Plan     Row Name 03/14/19 1400          PT Assessment    Assessment Comments  He was still very guarded around his hips and neck. I had to be gentle around his UT's because it was so painful. His hip mms were also very guarded so we took time on releasing those before stretching. His tang OI were also very tender.   -TB        PT Plan    PT Plan Comments  Cont with STM and hip mobility and progress to more balance and strengthening.  -TB       User Key  (r) = Recorded By, (t) = Taken By, (c) = Cosigned By    Initials Name Provider Type    TB Rigo Escudero, PT Physical Therapist              Exercises     Row Name 03/14/19 1400 03/14/19 1300          Subjective Comments    Subjective Comments  --  He says he can't tell much of a difference yet. He denies any falls or near falls.  -TB        Subjective Pain    Pre-Treatment Pain Level  --  7  -TB        Total Minutes    52175 - PT Manual Therapy Minutes  65  -TB  --       User Key  (r) = Recorded By, (t) = Taken By, (c) = Cosigned By    Initials Name Provider Type    TB Rigo Escudero, PT Physical Therapist                        Manual Rx (last 36 hours)      Manual Treatments     Row Name 03/14/19 1400 03/14/19 1300          Total Minutes    60914 - PT Manual Therapy Minutes  65  -TB  --         Manual Rx 1    Manual Rx 1 Location  --  supine subocc release and gentle manual cx traction  -TB     Manual Rx 1 Type  --  --  -TB     Manual Rx 1 Grade  --   tang lower cervical manual extension mobs sustained  -TB     Manual Rx 1 Duration  --  10  -TB        Manual Rx 2    Manual Rx 2 Location  --  tang UT/LS  -TB     Manual Rx 2 Type  --  STM/TrP pressure  -TB     Manual Rx 2 Grade  --  he was very tender, especially on the left so I had to back off some  -TB     Manual Rx 2 Duration  --  5 min each  -TB        Manual Rx 3    Manual Rx 3 Location  --  tang manual SNAG's mid to upper cervical  -TB     Manual Rx 3 Type  --  upward glide  -TB     Manual Rx 3 Duration  --  3 min each  -TB        Manual Rx 4    Manual Rx 4 Location  --  tang hip mms: IP, hip adductors, OI  -TB     Manual Rx 4 Type  --  STM and deep pressure  -TB     Manual Rx 4 Grade  --  very tender on right side and tang OI's  -TB     Manual Rx 4 Duration  --  15 min each  -TB        Manual Rx 5    Manual Rx 5 Location  --  passive stretch tang hips in all planes  -TB     Manual Rx 5 Type  --  included flex/abd, piri, hip ext off edge of mat  -TB     Manual Rx 5 Grade  --  low load sustained  -TB     Manual Rx 5 Duration  --  5 min each leg  -TB       User Key  (r) = Recorded By, (t) = Taken By, (c) = Cosigned By    Initials Name Provider Type    TB Rigo Escudero, PT Physical Therapist          PT OP Goals     Row Name 03/14/19 1400          PT Short Term Goals    STG Date to Achieve  04/05/19  -TB     STG 1  Improve tang hip passive extension to 10 deg  -TB     STG 1 Progress  Ongoing  -TB     STG 2  Improve tang hip flexion to 110 deg  -TB     STG 2 Progress  Ongoing  -TB     STG 2 Progress Comments  flexed closer to goal today  -TB     STG 3  Improve posture to more erect with neutral neck alignment  -TB     STG 3 Progress  Ongoing  -TB     STG 4  Able to transfer out of bed and to standing independently  -TB     STG 4 Progress  Ongoing  -TB         Long Term Goals    LTG Date to Achieve  05/03/19  -TB     LTG 1  Able to walk with upright posture without assistive device without lateral sway   -TB     LTG 1 Progress  New  -TB     LTG 2  Reports no falls or near falls x 1 weeks  -TB     LTG 2 Progress  New  -TB     LTG 3  Able to walk short community distances without fatigue  -TB     LTG 3 Progress  New  -TB     LTG 4  Gross tang hip MMT 4+/5  -TB     LTG 4 Progress  New  -TB     LTG 5  Reports no vertigo except minor twinges no more than 1-2/10 in intensity  -TB     LTG 5 Progress  New  -TB        Time Calculation    PT Goal Re-Cert Due Date  04/07/19  -TB       User Key  (r) = Recorded By, (t) = Taken By, (c) = Cosigned By    Initials Name Provider Type    TB Rigo Escudero, PT Physical Therapist          Therapy Education  Given: HEP, Symptoms/condition management, Pain management, Posture/body mechanics, Fall prevention and home safety  Program: New  How Provided: Verbal, Demonstration  Provided to: Patient, Caregiver  Level of Understanding: Teach back education performed, Verbalized              Time Calculation:   Start Time: 1305  Stop Time: 1410  Time Calculation (min): 65 min  Total Timed Code Minutes- PT: 65 minute(s)  Therapy Suggested Charges     Code   Minutes Charges    20669 (CPT®) Hc Pt Neuromusc Re Education Ea 15 Min      77182 (CPT®) Hc Pt Ther Proc Ea 15 Min      72009 (CPT®) Hc Gait Training Ea 15 Min      05701 (CPT®) Hc Pt Therapeutic Act Ea 15 Min      14693 (CPT®) Hc Pt Manual Therapy Ea 15 Min 65 4    15396 (CPT®) Hc Pt Ther Massage- Per 15 Min      64961 (CPT®) Hc Pt Iontophoresis Ea 15 Min      74953 (CPT®) Hc Pt Elec Stim Ea-Per 15 Min      52723 (CPT®) Hc Pt Ultrasound Ea 15 Min      77724 (CPT®) Hc Pt Self Care/Mgmt/Train Ea 15 Min      80145 (CPT®) Hc Pt Prosthetic (S) Train Initial Encounter, Each 15 Min      27131 (CPT®) Hc Orthotic(S) Mgmt/Train Initial Encounter, Each 15min      87410 (CPT®) Hc Pt Aquatic Therapy Ea 15  Min      44805 (CPT®) Hc Pt Orthotic(S)/Prosthetic(S) Encounter, Each 15 Min       (CPT®) Hc Pt Electrical Stim Unattended      Total  65 4        Therapy Charges for Today     Code Description Service Date Service Provider Modifiers Qty    53011459841 HC PT MANUAL THERAPY EA 15 MIN 3/14/2019 Rigo Escudero, PT GP 4                    Rigo Escudero, PT  3/14/2019

## 2019-03-18 ENCOUNTER — HOSPITAL ENCOUNTER (OUTPATIENT)
Dept: PHYSICAL THERAPY | Facility: HOSPITAL | Age: 64
Setting detail: THERAPIES SERIES
Discharge: HOME | End: 2019-03-18

## 2019-03-18 DIAGNOSIS — G90.9 AUTONOMIC NERVOUS SYSTEM DISORDER: ICD-10-CM

## 2019-03-18 DIAGNOSIS — M19.90 ARTHRITIS: ICD-10-CM

## 2019-03-18 DIAGNOSIS — R41.82 ALTERED MENTAL STATUS, UNSPECIFIED ALTERED MENTAL STATUS TYPE: ICD-10-CM

## 2019-03-18 DIAGNOSIS — R26.9 ABNORMALITY OF GAIT AND MOBILITY: Primary | ICD-10-CM

## 2019-03-18 PROCEDURE — 97140 MANUAL THERAPY 1/> REGIONS: CPT | Performed by: PHYSICAL THERAPIST

## 2019-03-18 NOTE — THERAPY TREATMENT NOTE
Outpatient Physical Therapy Ortho Treatment Note  [facility]     Patient Name: Erick Luong  : 1955  MRN: [id number]  Today's Date: 3/18/2019      Visit Date: 2019    Visit Dx:    ICD-10-CM ICD-9-CM   1. Abnormality of gait and mobility R26.9 781.2   2. Altered mental status, unspecified altered mental status type R41.82 780.97   3. Autonomic nervous system disorder G90.9 337.9   4. Arthritis M19.90 716.90       Patient Active Problem List   Diagnosis   • Lower abdominal pain   • SIRS (systemic inflammatory response syndrome) (CMS/[facility])   • Fever, unknown origin   • Viral gastroenteritis   • Chronic diastolic congestive heart failure (CMS/[facility])   • CAD (coronary artery disease)   • Diabetes mellitus (CMS/[facility])   • Hypertension   • Sleep apnea   • Arthritis   • Mixed hyperlipidemia   • Shortness of breath   • Acute pancreatitis   • Chest pain, non-cardiac   • Obesity, unspecified obesity severity, unspecified obesity type   • HTN (hypertension), benign   • Epigastric pain   • Type 2 diabetes mellitus with hyperglycemia, with long-term current use of insulin (CMS/[facility])   • Pleuritic chest pain   • Slow transit constipation   • Nausea and vomiting   • Chronic kidney disease, stage III (moderate) (CMS/[facility])   • Nonsmoker   • Orthostatic hypotension   • Abdominal pain   • Constipation   • Ischemic colitis (CMS/[facility])   • Altered mental status        Past Medical History:   Diagnosis Date   • Arthritis    • CHF (congestive heart failure) (CMS/[facility])    • Coronary artery disease    • Diabetes mellitus (CMS/[facility])    • Hyperlipidemia    • Hypertension    • Myocardial infarction (CMS/[facility])    • Pancreatitis    • Renal disorder    • Sleep apnea with use of continuous positive airway pressure (CPAP)         Past Surgical History:   Procedure Laterality Date   • ABDOMINAL SURGERY     • APPENDECTOMY     • BACK SURGERY     • CARDIAC SURGERY     • CATARACT EXTRACTION     • CERVICAL SPINE SURGERY     • COLONOSCOPY N/A  1/31/2017    Normal exam repeat in 5 years   • COLONOSCOPY N/A 2/11/2019    Procedure: COLONOSCOPY WITH ANESTHESIA;  Surgeon: Tyrell Smith MD;  Location: [facility] ENDOSCOPY;  Service: Gastroenterology   • COLONOSCOPY W/ POLYPECTOMY  03/04/2014    Hyperplastic polyp   • CORONARY ARTERY BYPASS GRAFT  10/2015   • ENDOSCOPY  04/13/2011    Gastritis with hemorrhage   • ENDOSCOPY N/A 5/5/2017    Normal exam   • ENDOSCOPY N/A 2/11/2019    Procedure: ESOPHAGOGASTRODUODENOSCOPY WITH ANESTHESIA;  Surgeon: Tyrell Smith MD;  Location: [facility] ENDOSCOPY;  Service: Gastroenterology   • INCISION AND DRAINAGE OF WOUND Left 09/2007    spider bite                       PT Assessment/Plan     Row Name 03/18/19 1300          PT Assessment    Assessment Comments  He is still quite tight but his left hip was better.   -TB        PT Plan    PT Plan Comments  cont emphasis on flexibility and progress to balance  -TB       User Key  (r) = Recorded By, (t) = Taken By, (c) = Cosigned By    Initials Name Provider Type    TB Rigo Escudero, PT Physical Therapist              Exercises     Row Name 03/18/19 1300             Subjective Comments    Subjective Comments  He says he feels better today. His wife says he's doing better today and hasn't fallen. He hasn't had any dizzy spells when he urinates.   -TB         Subjective Pain    Pre-Treatment Pain Level  5  -TB         Total Minutes    62445 - PT Manual Therapy Minutes  60  -TB        User Key  (r) = Recorded By, (t) = Taken By, (c) = Cosigned By    Initials Name Provider Type    TB Rigo Escudero, PT Physical Therapist                        Manual Rx (last 36 hours)      Manual Treatments     Row Name 03/18/19 1300             Total Minutes    20771 - PT Manual Therapy Minutes  60  -TB         Manual Rx 1    Manual Rx 1 Location  supine subocc release and gentle manual cx traction  -TB      Manual Rx 1 Grade   tang lower cervical manual extension mobs sustained  -TB       Manual Rx 1 Duration  10  -TB         Manual Rx 2    Manual Rx 2 Location  tang UT/LS  -TB      Manual Rx 2 Type  STM/TrP pressure  -TB      Manual Rx 2 Grade  he was very tender, especially on the left so I had to back off some  -TB      Manual Rx 2 Duration  5 min each  -TB         Manual Rx 3    Manual Rx 3 Location  tang manual SNAG's mid to upper cervical  -TB      Manual Rx 3 Type  upward glide  -TB      Manual Rx 3 Duration  3 min each  -TB         Manual Rx 4    Manual Rx 4 Location  tang hip mms: IP, hip adductors, OI  -TB      Manual Rx 4 Type  STM and deep pressure  -TB      Manual Rx 4 Grade  very tender on right side and tang OI's  -TB      Manual Rx 4 Duration  15 min each  -TB         Manual Rx 5    Manual Rx 5 Location  passive stretch tang hips in all planes  -TB      Manual Rx 5 Type  included flex/abd, piri, hip ext off edge of mat  -TB      Manual Rx 5 Grade  low load sustained  -TB      Manual Rx 5 Duration  5 min each leg  -TB        User Key  (r) = Recorded By, (t) = Taken By, (c) = Cosigned By    Initials Name Provider Type    TB Rigo Escudero, PT Physical Therapist          PT OP Goals     Row Name 03/18/19 1300          PT Short Term Goals    STG Date to Achieve  04/05/19  -TB     STG 1  Improve tang hip passive extension to 10 deg  -TB     STG 1 Progress  Ongoing  -TB     STG 2  Improve tang hip flexion to 110 deg  -TB     STG 2 Progress  Ongoing  -TB     STG 2 Progress Comments  better on left; right still tight  -TB     STG 3  Improve posture to more erect with neutral neck alignment  -TB     STG 3 Progress  Ongoing  -TB     STG 4  Able to transfer out of bed and to standing independently  -TB     STG 4 Progress  Ongoing  -TB        Long Term Goals    LTG Date to Achieve  05/03/19  -TB     LTG 1  Able to walk with upright posture without assistive device without lateral sway   -TB     LTG 1 Progress  New  -TB     LTG 2  Reports no falls or near falls x 1 weeks  -TB     LTG 2 Progress  New   -TB     LTG 3  Able to walk short community distances without fatigue  -TB     LTG 3 Progress  New  -TB     LTG 4  Gross tang hip MMT 4+/5  -TB     LTG 4 Progress  New  -TB     LTG 5  Reports no vertigo except minor twinges no more than 1-2/10 in intensity  -TB     LTG 5 Progress  New  -TB        Time Calculation    PT Goal Re-Cert Due Date  04/07/19  -TB       User Key  (r) = Recorded By, (t) = Taken By, (c) = Cosigned By    Initials Name Provider Type    TB Rigo Escudero, PT Physical Therapist          Therapy Education  Given: HEP, Symptoms/condition management, Pain management, Posture/body mechanics, Fall prevention and home safety  Program: Reinforced  How Provided: Verbal, Demonstration  Provided to: Patient, Caregiver  Level of Understanding: Teach back education performed, Verbalized              Time Calculation:   Start Time: 1300  Stop Time: 1400  Time Calculation (min): 60 min  Total Timed Code Minutes- PT: 60 minute(s)  Therapy Suggested Charges     Code   Minutes Charges    11583 (CPT®) Hc Pt Neuromusc Re Education Ea 15 Min      09557 (CPT®) Hc Pt Ther Proc Ea 15 Min      44017 (CPT®) Hc Gait Training Ea 15 Min      85345 (CPT®) Hc Pt Therapeutic Act Ea 15 Min      77100 (CPT®) Hc Pt Manual Therapy Ea 15 Min 60 4    98237 (CPT®) Hc Pt Ther Massage- Per 15 Min      56649 (CPT®) Hc Pt Iontophoresis Ea 15 Min      23027 (CPT®) Hc Pt Elec Stim Ea-Per 15 Min      93530 (CPT®) Hc Pt Ultrasound Ea 15 Min      41504 (CPT®) Hc Pt Self Care/Mgmt/Train Ea 15 Min      69302 (CPT®) Hc Pt Prosthetic (S) Train Initial Encounter, Each 15 Min      40394 (CPT®) Hc Orthotic(S) Mgmt/Train Initial Encounter, Each 15min      89813 (CPT®) Hc Pt Aquatic Therapy Ea 15 Min      01201 (CPT®) Hc Pt Orthotic(S)/Prosthetic(S) Encounter, Each 15 Min       (CPT®) Hc Pt Electrical Stim Unattended      Total  60 4        Therapy Charges for Today     Code Description Service Date Service Provider Modifiers Qty    57352409663  HC PT MANUAL THERAPY EA 15 MIN 3/18/2019 Rigo Escudero, PT GP 4                    Rigo Escudero, PT  3/18/2019

## 2019-03-19 ENCOUNTER — APPOINTMENT (OUTPATIENT)
Dept: PHYSICAL THERAPY | Facility: HOSPITAL | Age: 64
End: 2019-03-19

## 2019-03-19 ENCOUNTER — READMISSION MANAGEMENT (OUTPATIENT)
Dept: CALL CENTER | Facility: HOSPITAL | Age: 64
End: 2019-03-19

## 2019-03-19 NOTE — OUTREACH NOTE
Medical Week 2 Survey      Responses   Facility patient discharged from?  [facility]   Does the patient have one of the following disease processes/diagnoses(primary or secondary)?  Other   Week 2 attempt successful?  Yes   Call start time  1132   Discharge diagnosis  Altered mental status, Abdominal pain   Call end time  1133   Meds reviewed with patient/caregiver?  Yes   Is the patient having any side effects they believe may be caused by any medication additions or changes?  No   Does the patient have all medications ordered at discharge?  Yes   Is the patient taking all medications as directed (includes completed medication regime)?  Yes   Comments regarding appointments  Pt stated that he will be going to see MD at [place] but does not know when.   Has the patient kept scheduled appointments due by today?  Yes   What is the patient's perception of their health status since discharge?  Improving   Week 2 Call Completed?  Yes   Wrap up additional comments  no complaints          Shannon Mcmullen RN

## 2019-03-20 ENCOUNTER — APPOINTMENT (OUTPATIENT)
Dept: PHYSICAL THERAPY | Facility: HOSPITAL | Age: 64
End: 2019-03-20

## 2019-03-25 ENCOUNTER — HOSPITAL ENCOUNTER (OUTPATIENT)
Dept: PHYSICAL THERAPY | Facility: HOSPITAL | Age: 64
Setting detail: THERAPIES SERIES
Discharge: HOME | End: 2019-03-25

## 2019-03-25 DIAGNOSIS — G90.9 AUTONOMIC NERVOUS SYSTEM DISORDER: ICD-10-CM

## 2019-03-25 DIAGNOSIS — R41.82 ALTERED MENTAL STATUS, UNSPECIFIED ALTERED MENTAL STATUS TYPE: ICD-10-CM

## 2019-03-25 DIAGNOSIS — R26.9 ABNORMALITY OF GAIT AND MOBILITY: Primary | ICD-10-CM

## 2019-03-25 DIAGNOSIS — M19.90 ARTHRITIS: ICD-10-CM

## 2019-03-25 PROCEDURE — 97140 MANUAL THERAPY 1/> REGIONS: CPT | Performed by: PHYSICAL THERAPIST

## 2019-03-25 NOTE — THERAPY TREATMENT NOTE
Outpatient Physical Therapy Ortho Treatment Note  [facility]     Patient Name: Erick Luong  : 1955  MRN: [id number]  Today's Date: 3/25/2019      Visit Date: 2019    Visit Dx:    ICD-10-CM ICD-9-CM   1. Abnormality of gait and mobility R26.9 781.2   2. Altered mental status, unspecified altered mental status type R41.82 780.97   3. Autonomic nervous system disorder G90.9 337.9   4. Arthritis M19.90 716.90       Patient Active Problem List   Diagnosis   • Lower abdominal pain   • SIRS (systemic inflammatory response syndrome) (CMS/[facility])   • Fever, unknown origin   • Viral gastroenteritis   • Chronic diastolic congestive heart failure (CMS/[facility])   • CAD (coronary artery disease)   • Diabetes mellitus (CMS/[facility])   • Hypertension   • Sleep apnea   • Arthritis   • Mixed hyperlipidemia   • Shortness of breath   • Acute pancreatitis   • Chest pain, non-cardiac   • Obesity, unspecified obesity severity, unspecified obesity type   • HTN (hypertension), benign   • Epigastric pain   • Type 2 diabetes mellitus with hyperglycemia, with long-term current use of insulin (CMS/[facility])   • Pleuritic chest pain   • Slow transit constipation   • Nausea and vomiting   • Chronic kidney disease, stage III (moderate) (CMS/[facility])   • Nonsmoker   • Orthostatic hypotension   • Abdominal pain   • Constipation   • Ischemic colitis (CMS/[facility])   • Altered mental status        Past Medical History:   Diagnosis Date   • Arthritis    • CHF (congestive heart failure) (CMS/[facility])    • Coronary artery disease    • Diabetes mellitus (CMS/[facility])    • Hyperlipidemia    • Hypertension    • Myocardial infarction (CMS/[facility])    • Pancreatitis    • Renal disorder    • Sleep apnea with use of continuous positive airway pressure (CPAP)         Past Surgical History:   Procedure Laterality Date   • ABDOMINAL SURGERY     • APPENDECTOMY     • BACK SURGERY     • CARDIAC SURGERY     • CATARACT EXTRACTION     • CERVICAL SPINE SURGERY     • COLONOSCOPY N/A  1/31/2017    Normal exam repeat in 5 years   • COLONOSCOPY N/A 2/11/2019    Procedure: COLONOSCOPY WITH ANESTHESIA;  Surgeon: Tyrell Smith MD;  Location: [facility] ENDOSCOPY;  Service: Gastroenterology   • COLONOSCOPY W/ POLYPECTOMY  03/04/2014    Hyperplastic polyp   • CORONARY ARTERY BYPASS GRAFT  10/2015   • ENDOSCOPY  04/13/2011    Gastritis with hemorrhage   • ENDOSCOPY N/A 5/5/2017    Normal exam   • ENDOSCOPY N/A 2/11/2019    Procedure: ESOPHAGOGASTRODUODENOSCOPY WITH ANESTHESIA;  Surgeon: Tyrell Smith MD;  Location: [facility] ENDOSCOPY;  Service: Gastroenterology   • INCISION AND DRAINAGE OF WOUND Left 09/2007    spider bite                       PT Assessment/Plan     Row Name 03/25/19 1100          PT Assessment    Assessment Comments  His c/c is now his right hip. His left hip is improved. Overall, he is not showing the autonomic problems but still needs some work on his balance.   -TB        PT Plan    PT Plan Comments  Cont emphasis on right hip mobility and progress to balance.  -TB       User Key  (r) = Recorded By, (t) = Taken By, (c) = Cosigned By    Initials Name Provider Type    TB Rigo Escudero, PT Physical Therapist            Exercises     Row Name 03/25/19 1100             Subjective Comments    Subjective Comments  He says his pain is about the same. He still has pain in his neck, head and hips. His left hip isn't too bad but his right hip still hurts quite a bit.  He hasn't had any feeling of blacking out when he urintates. He does get a bit dizzy when bending over and then standing upright again.  -TB         Subjective Pain    Pre-Treatment Pain Level  5  -TB         Total Minutes    25665 - PT Manual Therapy Minutes  65  -TB        User Key  (r) = Recorded By, (t) = Taken By, (c) = Cosigned By    Initials Name Provider Type    TB Rigo Escudero PT Physical Therapist                      Manual Rx (last 36 hours)      Manual Treatments     Row Name 03/25/19 1100              Total Minutes    08211 - PT Manual Therapy Minutes  65  -TB         Manual Rx 1    Manual Rx 1 Location  supine subocc release and gentle manual cx traction  -TB      Manual Rx 1 Grade   tang lower cervical manual extension mobs sustained  -TB      Manual Rx 1 Duration  10  -TB         Manual Rx 2    Manual Rx 2 Location  tang UT/LS  -TB      Manual Rx 2 Type  STM/TrP pressure  -TB      Manual Rx 2 Grade  he was very tender, especially on the left so I had to back off some  -TB      Manual Rx 2 Duration  5 min each  -TB         Manual Rx 3    Manual Rx 3 Location  tang manual SNAG's mid to upper cervical  -TB      Manual Rx 3 Type  upward glide  -TB      Manual Rx 3 Duration  3 min each  -TB         Manual Rx 4    Manual Rx 4 Location  tang hip mms: IP, hip adductors, OI  -TB      Manual Rx 4 Type  STM and deep pressure  -TB      Manual Rx 4 Grade  very tender on right side and tang OI's  -TB      Manual Rx 4 Duration  10 min left; 20 min right  -TB         Manual Rx 5    Manual Rx 5 Location  passive stretch tang hips in all planes  -TB      Manual Rx 5 Type  included flex/abd, piri, hip ext off edge of mat  -TB      Manual Rx 5 Grade  low load sustained  -TB      Manual Rx 5 Duration  5 min each leg  -TB         Manual Rx 6    Manual Rx 6 Location  right LAD and lateral distraction  -TB      Manual Rx 6 Type  right hip  -TB      Manual Rx 6 Duration  10  -TB        User Key  (r) = Recorded By, (t) = Taken By, (c) = Cosigned By    Initials Name Provider Type    TB Rigo Escudero, PT Physical Therapist          PT OP Goals     Row Name 03/25/19 1100          PT Short Term Goals    STG Date to Achieve  04/05/19  -TB     STG 1  Improve tang hip passive extension to 10 deg  -TB     STG 1 Progress  Ongoing  -TB     STG 1 Progress Comments  left hip improved, right hip still tight  -TB     STG 2  Improve tang hip flexion to 110 deg  -TB     STG 2 Progress  Ongoing  -TB     STG 3  Improve posture to more erect with neutral neck  alignment  -TB     STG 3 Progress  Ongoing  -TB     STG 4  Able to transfer out of bed and to standing independently  -TB     STG 4 Progress  Ongoing  -TB        Long Term Goals    LTG Date to Achieve  05/03/19  -TB     LTG 1  Able to walk with upright posture without assistive device without lateral sway   -TB     LTG 1 Progress  New  -TB     LTG 2  Reports no falls or near falls x 1 weeks  -TB     LTG 2 Progress  New  -TB     LTG 3  Able to walk short community distances without fatigue  -TB     LTG 3 Progress  New  -TB     LTG 4  Gross tang hip MMT 4+/5  -TB     LTG 4 Progress  New  -TB     LTG 5  Reports no vertigo except minor twinges no more than 1-2/10 in intensity  -TB     LTG 5 Progress  New  -TB        Time Calculation    PT Goal Re-Cert Due Date  04/07/19  -TB       User Key  (r) = Recorded By, (t) = Taken By, (c) = Cosigned By    Initials Name Provider Type    TB Rigo Escudero, PT Physical Therapist          Therapy Education  Given: HEP, Symptoms/condition management, Pain management, Posture/body mechanics, Fall prevention and home safety  Program: Reinforced  How Provided: Verbal, Demonstration  Provided to: Patient, Caregiver  Level of Understanding: Teach back education performed, Verbalized              Time Calculation:   Start Time: 1105  Stop Time: 1210  Time Calculation (min): 65 min  Total Timed Code Minutes- PT: 65 minute(s)  Therapy Charges for Today     Code Description Service Date Service Provider Modifiers Qty    65009087012 HC PT MANUAL THERAPY EA 15 MIN 3/25/2019 Rigo Escudero, PT GP 4                    Rigo Escudero, PT  3/25/2019

## 2019-03-26 ENCOUNTER — READMISSION MANAGEMENT (OUTPATIENT)
Dept: CALL CENTER | Facility: HOSPITAL | Age: 64
End: 2019-03-26

## 2019-03-26 NOTE — OUTREACH NOTE
Medical Week 3 Survey      Responses   Facility patient discharged from?  [facility]   Does the patient have one of the following disease processes/diagnoses(primary or secondary)?  Other   Week 3 attempt successful?  Yes   Call start time  0932   Call end time  0934   Meds reviewed with patient/caregiver?  Yes   Is the patient taking all medications as directed (includes completed medication regime)?  Yes   Has the patient kept scheduled appointments due by today?  Yes   What is the patient's perception of their health status since discharge?  Improving   Week 3 Call Completed?  Yes   Graduated  Yes   Did the patient feel the follow up calls were helpful during their recovery period?  Yes   Was the number of calls appropriate?  Yes   Graduated/Revoked comments  He states, is doing fine, no new issues, no needs for now.           Kalli Perdomo, RN

## 2019-03-27 ENCOUNTER — HOSPITAL ENCOUNTER (OUTPATIENT)
Dept: PHYSICAL THERAPY | Facility: HOSPITAL | Age: 64
Setting detail: THERAPIES SERIES
Discharge: HOME | End: 2019-03-27

## 2019-03-27 DIAGNOSIS — G90.9 AUTONOMIC NERVOUS SYSTEM DISORDER: ICD-10-CM

## 2019-03-27 DIAGNOSIS — R41.82 ALTERED MENTAL STATUS, UNSPECIFIED ALTERED MENTAL STATUS TYPE: ICD-10-CM

## 2019-03-27 DIAGNOSIS — R26.9 ABNORMALITY OF GAIT AND MOBILITY: Primary | ICD-10-CM

## 2019-03-27 DIAGNOSIS — M19.90 ARTHRITIS: ICD-10-CM

## 2019-03-27 PROCEDURE — 97140 MANUAL THERAPY 1/> REGIONS: CPT | Performed by: PHYSICAL THERAPIST

## 2019-03-27 NOTE — THERAPY TREATMENT NOTE
Outpatient Physical Therapy Ortho Treatment Note  [facility]     Patient Name: Erick Luong  : 1955  MRN: [id number]  Today's Date: 3/27/2019      Visit Date: 2019    Visit Dx:    ICD-10-CM ICD-9-CM   1. Abnormality of gait and mobility R26.9 781.2   2. Altered mental status, unspecified altered mental status type R41.82 780.97   3. Autonomic nervous system disorder G90.9 337.9   4. Arthritis M19.90 716.90       Patient Active Problem List   Diagnosis   • Lower abdominal pain   • SIRS (systemic inflammatory response syndrome) (CMS/[facility])   • Fever, unknown origin   • Viral gastroenteritis   • Chronic diastolic congestive heart failure (CMS/[facility])   • CAD (coronary artery disease)   • Diabetes mellitus (CMS/[facility])   • Hypertension   • Sleep apnea   • Arthritis   • Mixed hyperlipidemia   • Shortness of breath   • Acute pancreatitis   • Chest pain, non-cardiac   • Obesity, unspecified obesity severity, unspecified obesity type   • HTN (hypertension), benign   • Epigastric pain   • Type 2 diabetes mellitus with hyperglycemia, with long-term current use of insulin (CMS/[facility])   • Pleuritic chest pain   • Slow transit constipation   • Nausea and vomiting   • Chronic kidney disease, stage III (moderate) (CMS/[facility])   • Nonsmoker   • Orthostatic hypotension   • Abdominal pain   • Constipation   • Ischemic colitis (CMS/[facility])   • Altered mental status        Past Medical History:   Diagnosis Date   • Arthritis    • CHF (congestive heart failure) (CMS/[facility])    • Coronary artery disease    • Diabetes mellitus (CMS/[facility])    • Hyperlipidemia    • Hypertension    • Myocardial infarction (CMS/[facility])    • Pancreatitis    • Renal disorder    • Sleep apnea with use of continuous positive airway pressure (CPAP)         Past Surgical History:   Procedure Laterality Date   • ABDOMINAL SURGERY     • APPENDECTOMY     • BACK SURGERY     • CARDIAC SURGERY     • CATARACT EXTRACTION     • CERVICAL SPINE SURGERY     • COLONOSCOPY N/A  1/31/2017    Normal exam repeat in 5 years   • COLONOSCOPY N/A 2/11/2019    Procedure: COLONOSCOPY WITH ANESTHESIA;  Surgeon: Tyrell Smith MD;  Location: [facility] ENDOSCOPY;  Service: Gastroenterology   • COLONOSCOPY W/ POLYPECTOMY  03/04/2014    Hyperplastic polyp   • CORONARY ARTERY BYPASS GRAFT  10/2015   • ENDOSCOPY  04/13/2011    Gastritis with hemorrhage   • ENDOSCOPY N/A 5/5/2017    Normal exam   • ENDOSCOPY N/A 2/11/2019    Procedure: ESOPHAGOGASTRODUODENOSCOPY WITH ANESTHESIA;  Surgeon: Tyrell Smith MD;  Location: [facility] ENDOSCOPY;  Service: Gastroenterology   • INCISION AND DRAINAGE OF WOUND Left 09/2007    spider bite                       PT Assessment/Plan     Row Name 03/27/19 1020          PT Assessment    Assessment Comments  The last treatment really made a big difference on his right hip especially. He reported it being about 75% better and he was significantly less tender. He still tends to lean against items to steady himself  -TB        PT Plan    PT Plan Comments  If his neck and hip are still doing well, emphasize balance more.   -TB       User Key  (r) = Recorded By, (t) = Taken By, (c) = Cosigned By    Initials Name Provider Type    TB Rigo Escudero, PT Physical Therapist            Exercises     Row Name 03/27/19 1020             Subjective Comments    Subjective Comments  He says his right hip is about 75% better since the last visit. He hardly has any pain on his left. His neck is also better but still tight. He's not having the headaches he's been having.   -TB         Subjective Pain    Pre-Treatment Pain Level  3  -TB         Total Minutes    39443 - PT Manual Therapy Minutes  55  -TB        User Key  (r) = Recorded By, (t) = Taken By, (c) = Cosigned By    Initials Name Provider Type    TB Rigo Escudero, PT Physical Therapist                      Manual Rx (last 36 hours)      Manual Treatments     Row Name 03/27/19 1020             Total Minutes    34543 - PT Manual  Therapy Minutes  55  -TB         Manual Rx 1    Manual Rx 1 Location  prone CT junction  -TB      Manual Rx 1 Type  ext mob  -TB      Manual Rx 1 Duration  15  -TB         Manual Rx 2    Manual Rx 2 Location  tang UT/LS  -TB      Manual Rx 2 Type  STM/TrP pressure  -TB      Manual Rx 2 Grade  prone   -TB      Manual Rx 2 Duration  15  -TB         Manual Rx 4    Manual Rx 4 Location  tang hip mms: IP, hip adductors, OI  -TB      Manual Rx 4 Type  STM and deep pressure  -TB      Manual Rx 4 Grade  very tender on right side and tang OI's  -TB      Manual Rx 4 Duration  5 min left; 15 min right  -TB         Manual Rx 5    Manual Rx 5 Location  passive stretch tang hips in all planes  -TB      Manual Rx 5 Type  included flex/abd, piri, hip ext off edge of mat  -TB      Manual Rx 5 Grade  low load sustained  -TB      Manual Rx 5 Duration  5 min each leg  -TB         Manual Rx 6    Manual Rx 6 Location  right LAD  -TB      Manual Rx 6 Type  right hip  -TB      Manual Rx 6 Grade  with hip IR/ER  -TB      Manual Rx 6 Duration  5  -TB        User Key  (r) = Recorded By, (t) = Taken By, (c) = Cosigned By    Initials Name Provider Type    TB Rigo Escudero, PT Physical Therapist          PT OP Goals     Row Name 03/27/19 1020          PT Short Term Goals    STG Date to Achieve  04/05/19  -TB     STG 1  Improve tang hip passive extension to 10 deg  -TB     STG 1 Progress  Ongoing  -TB     STG 2  Improve tang hip flexion to 110 deg  -TB     STG 2 Progress  Ongoing  -TB     STG 3  Improve posture to more erect with neutral neck alignment  -TB     STG 3 Progress  Ongoing  -TB     STG 3 Progress Comments  he is walking more upright  -TB     STG 4  Able to transfer out of bed and to standing independently  -TB     STG 4 Progress  Ongoing  -TB        Long Term Goals    LTG Date to Achieve  05/03/19  -TB     LTG 1  Able to walk with upright posture without assistive device without lateral sway   -TB     LTG 1 Progress  New  -TB     LTG 2   Reports no falls or near falls x 1 weeks  -TB     LTG 2 Progress  New  -TB     LTG 3  Able to walk short community distances without fatigue  -TB     LTG 3 Progress  New  -TB     LTG 4  Gross tang hip MMT 4+/5  -TB     LTG 4 Progress  New  -TB     LTG 5  Reports no vertigo except minor twinges no more than 1-2/10 in intensity  -TB     LTG 5 Progress  New  -TB        Time Calculation    PT Goal Re-Cert Due Date  04/07/19  -TB       User Key  (r) = Recorded By, (t) = Taken By, (c) = Cosigned By    Initials Name Provider Type    TB Rigo Escudero, PT Physical Therapist          Therapy Education  Given: HEP, Symptoms/condition management, Pain management, Posture/body mechanics, Fall prevention and home safety  Program: Reinforced  How Provided: Verbal, Demonstration  Provided to: Patient, Caregiver  Level of Understanding: Teach back education performed, Verbalized              Time Calculation:   Start Time: 1020  Stop Time: 1115  Time Calculation (min): 55 min  Total Timed Code Minutes- PT: 55 minute(s)  Therapy Charges for Today     Code Description Service Date Service Provider Modifiers Qty    96999365810 HC PT MANUAL THERAPY EA 15 MIN 3/27/2019 Rigo Escudero, PT GP 4                    Rigo Escudero, PT  3/27/2019

## 2019-04-01 ENCOUNTER — APPOINTMENT (OUTPATIENT)
Dept: PHYSICAL THERAPY | Facility: HOSPITAL | Age: 64
End: 2019-04-01

## 2019-04-03 ENCOUNTER — APPOINTMENT (OUTPATIENT)
Dept: PHYSICAL THERAPY | Facility: HOSPITAL | Age: 64
End: 2019-04-03

## 2019-04-09 ENCOUNTER — HOSPITAL ENCOUNTER (OUTPATIENT)
Dept: PHYSICAL THERAPY | Facility: HOSPITAL | Age: 64
Setting detail: THERAPIES SERIES
Discharge: HOME | End: 2019-04-09

## 2019-04-09 DIAGNOSIS — G90.9 AUTONOMIC NERVOUS SYSTEM DISORDER: ICD-10-CM

## 2019-04-09 DIAGNOSIS — M19.90 ARTHRITIS: ICD-10-CM

## 2019-04-09 DIAGNOSIS — R41.82 ALTERED MENTAL STATUS, UNSPECIFIED ALTERED MENTAL STATUS TYPE: ICD-10-CM

## 2019-04-09 DIAGNOSIS — R26.9 ABNORMALITY OF GAIT AND MOBILITY: Primary | ICD-10-CM

## 2019-04-09 PROCEDURE — 97140 MANUAL THERAPY 1/> REGIONS: CPT | Performed by: PHYSICAL THERAPIST

## 2019-04-09 NOTE — THERAPY PROGRESS REPORT/RE-CERT
Outpatient Physical Therapy Ortho Progress Note  [facility]     Patient Name: Erick Luong  : 1955  MRN: [id number]  Today's Date: 2019      Visit Date: 2019    Visit Dx:    ICD-10-CM ICD-9-CM   1. Abnormality of gait and mobility R26.9 781.2   2. Altered mental status, unspecified altered mental status type R41.82 780.97   3. Autonomic nervous system disorder G90.9 337.9   4. Arthritis M19.90 716.90       Patient Active Problem List   Diagnosis   • Lower abdominal pain   • SIRS (systemic inflammatory response syndrome) (CMS/[facility])   • Fever, unknown origin   • Viral gastroenteritis   • Chronic diastolic congestive heart failure (CMS/[facility])   • CAD (coronary artery disease)   • Diabetes mellitus (CMS/[facility])   • Hypertension   • Sleep apnea   • Arthritis   • Mixed hyperlipidemia   • Shortness of breath   • Acute pancreatitis   • Chest pain, non-cardiac   • Obesity, unspecified obesity severity, unspecified obesity type   • HTN (hypertension), benign   • Epigastric pain   • Type 2 diabetes mellitus with hyperglycemia, with long-term current use of insulin (CMS/[facility])   • Pleuritic chest pain   • Slow transit constipation   • Nausea and vomiting   • Chronic kidney disease, stage III (moderate) (CMS/[facility])   • Nonsmoker   • Orthostatic hypotension   • Abdominal pain   • Constipation   • Ischemic colitis (CMS/[facility])   • Altered mental status        Past Medical History:   Diagnosis Date   • Arthritis    • CHF (congestive heart failure) (CMS/[facility])    • Coronary artery disease    • Diabetes mellitus (CMS/[facility])    • Hyperlipidemia    • Hypertension    • Myocardial infarction (CMS/[facility])    • Pancreatitis    • Renal disorder    • Sleep apnea with use of continuous positive airway pressure (CPAP)         Past Surgical History:   Procedure Laterality Date   • ABDOMINAL SURGERY     • APPENDECTOMY     • BACK SURGERY     • CARDIAC SURGERY     • CATARACT EXTRACTION     • CERVICAL SPINE SURGERY     • COLONOSCOPY N/A  1/31/2017    Normal exam repeat in 5 years   • COLONOSCOPY N/A 2/11/2019    Procedure: COLONOSCOPY WITH ANESTHESIA;  Surgeon: Tyrell Smith MD;  Location: [facility] ENDOSCOPY;  Service: Gastroenterology   • COLONOSCOPY W/ POLYPECTOMY  03/04/2014    Hyperplastic polyp   • CORONARY ARTERY BYPASS GRAFT  10/2015   • ENDOSCOPY  04/13/2011    Gastritis with hemorrhage   • ENDOSCOPY N/A 5/5/2017    Normal exam   • ENDOSCOPY N/A 2/11/2019    Procedure: ESOPHAGOGASTRODUODENOSCOPY WITH ANESTHESIA;  Surgeon: Tyrell Smith MD;  Location: [facility] ENDOSCOPY;  Service: Gastroenterology   • INCISION AND DRAINAGE OF WOUND Left 09/2007    spider bite                       PT Assessment/Plan     Row Name 04/09/19 1030          PT Assessment    Functional Limitations  Impaired gait;Limitations in community activities;Limitations in functional capacity and performance;Performance in leisure activities;Performance in self-care ADL;Limitation in home management  -TB     Impairments  Gait;Balance;Pain;Posture;Range of motion;Muscle strength;Joint mobility;Peripheral nerve integrity;Motor function;Sensation;Impaired flexibility  -TB     Assessment Comments  His dizziness is certainly better. He is not blacking out when he urinates now. His neck pain was flared because he had to miss PT and was on his feet alot from his sister's sudden death. His right leg pain appears to be more radicular so I had him emphasize flexibility at home. His right hip was doing better and he wasn't having the pain here he was previously.   -TB     Please refer to paper survey for additional self-reported information  Yes  -TB     Rehab Potential  Good  -TB     Patient/caregiver participated in establishment of treatment plan and goals  Yes  -TB     Patient would benefit from skilled therapy intervention  Yes  -TB        PT Plan    PT Frequency  1x/week  -TB     Predicted Duration of Therapy Intervention (Therapy Eval)  4 weeks  -TB     Planned CPT's?  PT  THER PROC EA 15 MIN: 45577;PT THER ACT EA 15 MIN: 87929;PT MANUAL THERAPY EA 15 MIN: 04079;PT GAIT TRAINING EA 15 MIN: 55546;PT ELECTRICAL STIM UNATTEND: ;PT ELECTRICAL STIM ATTD EA 15 MIN: 49180;PT ULTRASOUND EA 15 MIN: 57407  -TB     PT Plan Comments  Progress to more balance and core/hip stability. Cont to work on neck mobility as needed for pain.  -TB       User Key  (r) = Recorded By, (t) = Taken By, (c) = Cosigned By    Initials Name Provider Type    TB Rigo Escudero, PT Physical Therapist            Exercises     Row Name 04/09/19 1035             Subjective Comments    Subjective Comments  He's been off because he had a death in the family. His neck and lower legs are hurting.  -TB         Subjective Pain    Pre-Treatment Pain Level  7  -TB         Total Minutes    12231 - PT Manual Therapy Minutes  45  -TB        User Key  (r) = Recorded By, (t) = Taken By, (c) = Cosigned By    Initials Name Provider Type    TB Rigo Escudero, PT Physical Therapist                      Manual Rx (last 36 hours)      Manual Treatments     Row Name 04/09/19 1035 04/09/19 1030          Total Minutes    61442 - PT Manual Therapy Minutes  45  -TB  --        Manual Rx 1    Manual Rx 1 Location  --  supine subocc release  -TB     Manual Rx 1 Type  --  with manual cervical traction  -TB     Manual Rx 1 Duration  --  15  -TB        Manual Rx 2    Manual Rx 2 Location  --  supine tang lower cx ext mobs  -TB     Manual Rx 2 Type  --  with STM/TrP pressure  -TB     Manual Rx 2 Duration  --  15  -TB        Manual Rx 5    Manual Rx 5 Location  --  passive stretch tang hips in all planes  -TB     Manual Rx 5 Type  --  included flex/abd, piri, hip ext off edge of mat  -TB     Manual Rx 5 Grade  --  low load sustained  -TB     Manual Rx 5 Duration  --  5 min each leg  -TB        Manual Rx 6    Manual Rx 6 Location  --  right LAD  -TB     Manual Rx 6 Type  --  right hip  -TB     Manual Rx 6 Grade  --  with hip IR/ER  -TB     Manual  "Rx 6 Duration  --  5  -TB       User Key  (r) = Recorded By, (t) = Taken By, (c) = Cosigned By    Initials Name Provider Type    TB Rigo Escudero, PT Physical Therapist          PT OP Goals     Row Name 04/09/19 1035          PT Short Term Goals    STG Date to Achieve  04/05/19  -TB     STG 1  Improve tang hip passive extension to 10 deg  -TB     STG 1 Progress  Ongoing  -TB     STG 1 Progress Comments  right hip still to neutral; left was going into ext  -TB     STG 2  Improve tang hip flexion to 110 deg  -TB     STG 2 Progress  Met  -TB     STG 3  Improve posture to more erect with neutral neck alignment  -TB     STG 3 Progress  Ongoing  -TB     STG 3 Progress Comments  improved after PT, was stooped forward from pain  -TB     STG 4  Able to transfer out of bed and to standing independently  -TB     STG 4 Progress  Ongoing  -TB     STG 4 Progress Comments  needed min asst to sit up from mat  -TB        Long Term Goals    LTG Date to Achieve  05/03/19  -TB     LTG 1  Able to walk with upright posture without assistive device without lateral sway   -TB     LTG 1 Progress  Ongoing  -TB     LTG 1 Progress Comments  walking without an assistive device  -TB     LTG 2  Reports no falls or near falls x 1 weeks  -TB     LTG 2 Progress  Ongoing  -TB     LTG 2 Progress Comments  no falls for over a week  -TB     LTG 3  Able to walk short community distances without fatigue  -TB     LTG 3 Progress  Ongoing  -TB     LTG 3 Progress Comments  he is walking in the community but gets \"wobbly\"  -TB     LTG 4  Gross tang hip MMT 4+/5  -TB     LTG 4 Progress  Ongoing  -TB     LTG 4 Progress Comments  4/5  -TB     LTG 5  Reports no vertigo except minor twinges no more than 1-2/10 in intensity  -TB     LTG 5 Progress  Ongoing  -TB     LTG 5 Progress Comments  reports minor twinges of dizziness and uneasiness when he first stands  -TB        Time Calculation    PT Goal Re-Cert Due Date  05/09/19  -TB       User Key  (r) = Recorded By, " (t) = Taken By, (c) = Cosigned By    Initials Name Provider Type    TB Rigo Escudero, PT Physical Therapist          Therapy Education  Given: HEP, Symptoms/condition management, Pain management, Posture/body mechanics, Fall prevention and home safety  Program: Reinforced  How Provided: Verbal, Demonstration  Provided to: Patient, Caregiver  Level of Understanding: Teach back education performed, Verbalized              Time Calculation:   Start Time: 1030  Stop Time: 1115  Time Calculation (min): 45 min  Total Timed Code Minutes- PT: 45 minute(s)  Therapy Charges for Today     Code Description Service Date Service Provider Modifiers Qty    98397746392 HC PT MANUAL THERAPY EA 15 MIN 4/9/2019 Rigo Escudero, PT GP 3                    Rigo Escudero, PT  4/9/2019

## 2019-04-11 ENCOUNTER — HOSPITAL ENCOUNTER (OUTPATIENT)
Dept: PHYSICAL THERAPY | Facility: HOSPITAL | Age: 64
Setting detail: THERAPIES SERIES
Discharge: HOME | End: 2019-04-11

## 2019-04-11 DIAGNOSIS — M19.90 ARTHRITIS: ICD-10-CM

## 2019-04-11 DIAGNOSIS — G90.9 AUTONOMIC NERVOUS SYSTEM DISORDER: ICD-10-CM

## 2019-04-11 DIAGNOSIS — R41.82 ALTERED MENTAL STATUS, UNSPECIFIED ALTERED MENTAL STATUS TYPE: ICD-10-CM

## 2019-04-11 DIAGNOSIS — R26.9 ABNORMALITY OF GAIT AND MOBILITY: Primary | ICD-10-CM

## 2019-04-11 PROCEDURE — 97110 THERAPEUTIC EXERCISES: CPT | Performed by: PHYSICAL THERAPIST

## 2019-04-11 PROCEDURE — 97140 MANUAL THERAPY 1/> REGIONS: CPT | Performed by: PHYSICAL THERAPIST

## 2019-04-11 NOTE — THERAPY TREATMENT NOTE
Outpatient Physical Therapy Neuro Treatment Note  [facility]     Patient Name: Erick Luong  : 1955  MRN: [id number]  Today's Date: 2019      Visit Date: 2019    Visit Dx:    ICD-10-CM ICD-9-CM   1. Abnormality of gait and mobility R26.9 781.2   2. Altered mental status, unspecified altered mental status type R41.82 780.97   3. Autonomic nervous system disorder G90.9 337.9   4. Arthritis M19.90 716.90       Patient Active Problem List   Diagnosis   • Lower abdominal pain   • SIRS (systemic inflammatory response syndrome) (CMS/HCC)   • Fever, unknown origin   • Viral gastroenteritis   • Chronic diastolic congestive heart failure (CMS/HCC)   • CAD (coronary artery disease)   • Diabetes mellitus (CMS/[facility])   • Hypertension   • Sleep apnea   • Arthritis   • Mixed hyperlipidemia   • Shortness of breath   • Acute pancreatitis   • Chest pain, non-cardiac   • Obesity, unspecified obesity severity, unspecified obesity type   • HTN (hypertension), benign   • Epigastric pain   • Type 2 diabetes mellitus with hyperglycemia, with long-term current use of insulin (CMS/[facility])   • Pleuritic chest pain   • Slow transit constipation   • Nausea and vomiting   • Chronic kidney disease, stage III (moderate) (CMS/HCC)   • Nonsmoker   • Orthostatic hypotension   • Abdominal pain   • Constipation   • Ischemic colitis (CMS/HCC)   • Altered mental status                       PT Assessment/Plan     Row Name 19 1100          PT Assessment    Assessment Comments  His neck and lumbar radicular were better so we were able to progress to more hip stability and balance exercises. He still struggles with SLS stability.  -TB        PT Plan    PT Plan Comments  Cont progressive hip stability.   -TB       User Key  (r) = Recorded By, (t) = Taken By, (c) = Cosigned By    Initials Name Provider Type    TB Rigo Escudero, PT Physical Therapist             Exercises     Row Name 19 1100 19 1030          Subjective  Comments    Subjective Comments  --  He says he's feeling better. He is still having the radicular pain but it's not constant.   -TB        Subjective Pain    Pre-Treatment Pain Level  --  4  -TB        Total Minutes    73889 - PT Therapeutic Exercise Minutes  15  -TB  --     64715 - PT Manual Therapy Minutes  30  -TB  --        Exercise 1    Exercise Name 1  --  Sustained lunge on round [facility]  -TB     Time 1  --  5 min each leg; alternating as needed due to fatigue  -TB     Additional Comments  --  in // bars for safety  -TB        Exercise 2    Exercise Name 2  --  SLS with hip abd and airplane  -TB     Time 2  --  1 min each leg  -TB     Additional Comments  --  // bars used for asst  -TB       User Key  (r) = Recorded By, (t) = Taken By, (c) = Cosigned By    Initials Name Provider Type    TB Rigo Escudero, PT Physical Therapist                     Manual Rx (last 36 hours)      Manual Treatments     Row Name 04/11/19 1100 04/11/19 1030          Total Minutes    96508 - PT Manual Therapy Minutes  30  -TB  --        Manual Rx 1    Manual Rx 1 Location  --  prone CT  -TB     Manual Rx 1 Type  --  extension mob  -TB     Manual Rx 1 Duration  --  10  -TB        Manual Rx 2    Manual Rx 2 Location  --  prone C1 tang  -TB     Manual Rx 2 Type  --  PA  -TB     Manual Rx 2 Grade  --  sustained 3  -TB     Manual Rx 2 Duration  --  10  -TB        Manual Rx 3    Manual Rx 3 Location  --  prone tang UT/LS  -TB     Manual Rx 3 Grade  --  STM  -TB     Manual Rx 3 Duration  --  10  -TB       User Key  (r) = Recorded By, (t) = Taken By, (c) = Cosigned By    Initials Name Provider Type    TB Rigo Escudero, PT Physical Therapist          PT OP Goals     Row Name 04/11/19 1100          PT Short Term Goals    STG Date to Achieve  04/05/19  -TB     STG 1  Improve tang hip passive extension to 10 deg  -TB     STG 1 Progress  Ongoing  -TB     STG 2  Improve tang hip flexion to 110 deg  -TB     STG 2 Progress  Met  -TB     STG 3   Improve posture to more erect with neutral neck alignment  -TB     STG 3 Progress  Ongoing  -TB     STG 3 Progress Comments  still forward head but neck pain better  -TB     STG 4  Able to transfer out of bed and to standing independently  -TB     STG 4 Progress  Ongoing  -TB        Long Term Goals    LTG Date to Achieve  05/03/19  -TB     LTG 1  Able to walk with upright posture without assistive device without lateral sway   -TB     LTG 1 Progress  Ongoing  -TB     LTG 2  Reports no falls or near falls x 1 weeks  -TB     LTG 2 Progress  Ongoing  -TB     LTG 3  Able to walk short community distances without fatigue  -TB     LTG 3 Progress  Ongoing  -TB     LTG 4  Gross tang hip MMT 4+/5  -TB     LTG 4 Progress  Ongoing  -TB     LTG 5  Reports no vertigo except minor twinges no more than 1-2/10 in intensity  -TB     LTG 5 Progress  Ongoing  -TB        Time Calculation    PT Goal Re-Cert Due Date  05/09/19  -TB       User Key  (r) = Recorded By, (t) = Taken By, (c) = Cosigned By    Initials Name Provider Type    TB Rigo Escudero, PT Physical Therapist          Therapy Education  Education Details: SLS with hip abd/airplane  Given: HEP, Symptoms/condition management, Pain management, Posture/body mechanics, Fall prevention and home safety  Program: Reinforced  How Provided: Verbal, Demonstration  Provided to: Patient, Caregiver  Level of Understanding: Teach back education performed, Verbalized              Time Calculation:   Start Time: 1030  Stop Time: 1115  Time Calculation (min): 45 min  Total Timed Code Minutes- PT: 45 minute(s)   Therapy Charges for Today     Code Description Service Date Service Provider Modifiers Qty    19682279102 HC PT THER PROC EA 15 MIN 4/11/2019 Rigo Escudero, PT GP 1    67910712137 HC PT MANUAL THERAPY EA 15 MIN 4/11/2019 Rigo Escudero, PT GP 2                    Rigo Escudero PT  4/11/2019

## 2019-04-16 ENCOUNTER — APPOINTMENT (OUTPATIENT)
Dept: PHYSICAL THERAPY | Facility: HOSPITAL | Age: 64
End: 2019-04-16

## 2019-04-18 ENCOUNTER — APPOINTMENT (OUTPATIENT)
Dept: PHYSICAL THERAPY | Facility: HOSPITAL | Age: 64
End: 2019-04-18

## 2019-04-23 ENCOUNTER — HOSPITAL ENCOUNTER (OUTPATIENT)
Dept: PHYSICAL THERAPY | Facility: HOSPITAL | Age: 64
Setting detail: THERAPIES SERIES
Discharge: HOME | End: 2019-04-23

## 2019-04-23 DIAGNOSIS — R41.82 ALTERED MENTAL STATUS, UNSPECIFIED ALTERED MENTAL STATUS TYPE: ICD-10-CM

## 2019-04-23 DIAGNOSIS — M19.90 ARTHRITIS: ICD-10-CM

## 2019-04-23 DIAGNOSIS — R26.9 ABNORMALITY OF GAIT AND MOBILITY: Primary | ICD-10-CM

## 2019-04-23 DIAGNOSIS — G90.9 AUTONOMIC NERVOUS SYSTEM DISORDER: ICD-10-CM

## 2019-04-23 PROCEDURE — 97110 THERAPEUTIC EXERCISES: CPT | Performed by: PHYSICAL THERAPIST

## 2019-04-23 PROCEDURE — 97140 MANUAL THERAPY 1/> REGIONS: CPT | Performed by: PHYSICAL THERAPIST

## 2019-04-23 NOTE — THERAPY TREATMENT NOTE
Outpatient Physical Therapy Vestibular Treatment Note  [facility]     Patient Name: Erick Luong  : 1955  MRN: [id number]  Today's Date: 2019      Visit Date: 2019    Visit Dx:     ICD-10-CM ICD-9-CM   1. Abnormality of gait and mobility R26.9 781.2   2. Altered mental status, unspecified altered mental status type R41.82 780.97   3. Autonomic nervous system disorder G90.9 337.9   4. Arthritis M19.90 716.90       Patient Active Problem List   Diagnosis   • Lower abdominal pain   • SIRS (systemic inflammatory response syndrome) (CMS/HCC)   • Fever, unknown origin   • Viral gastroenteritis   • Chronic diastolic congestive heart failure (CMS/HCC)   • CAD (coronary artery disease)   • Diabetes mellitus (CMS/[facility])   • Hypertension   • Sleep apnea   • Arthritis   • Mixed hyperlipidemia   • Shortness of breath   • Acute pancreatitis   • Chest pain, non-cardiac   • Obesity, unspecified obesity severity, unspecified obesity type   • HTN (hypertension), benign   • Epigastric pain   • Type 2 diabetes mellitus with hyperglycemia, with long-term current use of insulin (CMS/HCC)   • Pleuritic chest pain   • Slow transit constipation   • Nausea and vomiting   • Chronic kidney disease, stage III (moderate) (CMS/HCC)   • Nonsmoker   • Orthostatic hypotension   • Abdominal pain   • Constipation   • Ischemic colitis (CMS/HCC)   • Altered mental status                       PT Assessment/Plan     Row Name 19 1035          PT Assessment    Assessment Comments  He had a terrible flare of his autonomic/vertigo problem last week. The encouraging thing is his neck pain and hip pain are better and he recovered faster. He goes to a specialist next week.  -TB        PT Plan    PT Plan Comments  Assess if he needs to continue PT vs holding.  -TB       User Key  (r) = Recorded By, (t) = Taken By, (c) = Cosigned By    Initials Name Provider Type    TB Rigo Escudero, PT Physical Therapist             Exercises      "Row Name 04/23/19 1035             Subjective Comments    Subjective Comments  He had another flare of his nausea and balance that lasted about 3 days. His neck has been better and his hip isn't bothering him. He became really weak and \"could hardly do anything\". He feels like he's about 80% of his baseline compared to before the flare. This dizziness would come and go. He was having the blacking out spells when he would urinate again.   -TB         Subjective Pain    Pre-Treatment Pain Level  4  -TB         Total Minutes    95055 - PT Therapeutic Exercise Minutes  10  -TB      20141 - PT Manual Therapy Minutes  30  -TB         Exercise 1    Exercise Name 1  wobble board AP and lat  -TB      Time 1  5 min each durection  -TB      Additional Comments  added slow head turns; struggle more on AP and had to steady more with head turns  -TB        User Key  (r) = Recorded By, (t) = Taken By, (c) = Cosigned By    Initials Name Provider Type    TB Rigo Escudero, PT Physical Therapist           Manual Rx (last 36 hours)      Manual Treatments     Row Name 04/23/19 1035             Total Minutes    19036 - PT Manual Therapy Minutes  30  -TB         Manual Rx 1    Manual Rx 1 Location  supine tang lower cx ext mobs  -TB      Manual Rx 1 Grade  3 sustained  -TB      Manual Rx 1 Duration  5  -TB         Manual Rx 2    Manual Rx 2 Location  subocc release with manual cx traction  -TB      Manual Rx 2 Duration  5  -TB         Manual Rx 3    Manual Rx 3 Location  passive stretch tang hips all planes  -TB      Manual Rx 3 Duration  10 min each  -TB        User Key  (r) = Recorded By, (t) = Taken By, (c) = Cosigned By    Initials Name Provider Type    TB Rigo Escudero, PT Physical Therapist                    PT OP Goals     Row Name 04/23/19 1035          PT Short Term Goals    STG Date to Achieve  04/05/19  -TB     STG 1  Improve tang hip passive extension to 10 deg  -TB     STG 1 Progress  Ongoing  -TB     STG 2  Improve tang " hip flexion to 110 deg  -TB     STG 2 Progress  Met  -TB     STG 3  Improve posture to more erect with neutral neck alignment  -TB     STG 3 Progress  Ongoing  -TB     STG 4  Able to transfer out of bed and to standing independently  -TB     STG 4 Progress  Ongoing  -TB        Long Term Goals    LTG Date to Achieve  05/03/19  -TB     LTG 1  Able to walk with upright posture without assistive device without lateral sway   -TB     LTG 1 Progress  Ongoing  -TB     LTG 2  Reports no falls or near falls x 1 weeks  -TB     LTG 2 Progress  Ongoing  -TB     LTG 3  Able to walk short community distances without fatigue  -TB     LTG 3 Progress  Ongoing  -TB     LTG 4  Gross tang hip MMT 4+/5  -TB     LTG 4 Progress  Ongoing  -TB     LTG 5  Reports no vertigo except minor twinges no more than 1-2/10 in intensity  -TB     LTG 5 Progress  Ongoing  -TB     LTG 5 Progress Comments  had a bad flare of vertigo  -TB        Time Calculation    PT Goal Re-Cert Due Date  05/09/19  -TB       User Key  (r) = Recorded By, (t) = Taken By, (c) = Cosigned By    Initials Name Provider Type    TB Rigo Escudero, PT Physical Therapist          Therapy Education  Given: HEP, Symptoms/condition management, Pain management, Posture/body mechanics, Fall prevention and home safety  Program: Reinforced  How Provided: Verbal, Demonstration  Provided to: Patient, Caregiver  Level of Understanding: Teach back education performed, Verbalized              Time Calculation:   Start Time: 1035  Stop Time: 1115  Time Calculation (min): 40 min  Total Timed Code Minutes- PT: 40 minute(s)   Therapy Charges for Today     Code Description Service Date Service Provider Modifiers Qty    34781435915 HC PT THER PROC EA 15 MIN 4/23/2019 Rigo Escudero, PT GP 1    06121460133 HC PT MANUAL THERAPY EA 15 MIN 4/23/2019 Rigo Escudero, PT GP 2                   Rigo Escudero, PT  4/23/2019

## 2019-04-25 ENCOUNTER — HOSPITAL ENCOUNTER (OUTPATIENT)
Dept: PHYSICAL THERAPY | Facility: HOSPITAL | Age: 64
Setting detail: THERAPIES SERIES
Discharge: HOME | End: 2019-04-25

## 2019-04-25 DIAGNOSIS — M19.90 ARTHRITIS: ICD-10-CM

## 2019-04-25 DIAGNOSIS — G90.9 AUTONOMIC NERVOUS SYSTEM DISORDER: ICD-10-CM

## 2019-04-25 DIAGNOSIS — R26.9 ABNORMALITY OF GAIT AND MOBILITY: Primary | ICD-10-CM

## 2019-04-25 DIAGNOSIS — R41.82 ALTERED MENTAL STATUS, UNSPECIFIED ALTERED MENTAL STATUS TYPE: ICD-10-CM

## 2019-04-25 PROCEDURE — 97110 THERAPEUTIC EXERCISES: CPT | Performed by: PHYSICAL THERAPIST

## 2019-04-25 PROCEDURE — 97140 MANUAL THERAPY 1/> REGIONS: CPT | Performed by: PHYSICAL THERAPIST

## 2019-05-16 ENCOUNTER — LAB REQUISITION (OUTPATIENT)
Dept: LAB | Facility: HOSPITAL | Age: 64
End: 2019-05-16

## 2019-05-16 ENCOUNTER — OFFICE VISIT (OUTPATIENT)
Dept: WOUND CARE | Facility: HOSPITAL | Age: 64
End: 2019-05-16

## 2019-05-16 DIAGNOSIS — Z00.00 ENCOUNTER FOR GENERAL ADULT MEDICAL EXAMINATION WITHOUT ABNORMAL FINDINGS: ICD-10-CM

## 2019-05-16 PROCEDURE — 87147 CULTURE TYPE IMMUNOLOGIC: CPT | Performed by: PODIATRIST

## 2019-05-16 PROCEDURE — 87186 SC STD MICRODIL/AGAR DIL: CPT | Performed by: PODIATRIST

## 2019-05-16 PROCEDURE — 87075 CULTR BACTERIA EXCEPT BLOOD: CPT | Performed by: PODIATRIST

## 2019-05-16 PROCEDURE — G0463 HOSPITAL OUTPT CLINIC VISIT: HCPCS

## 2019-05-16 PROCEDURE — 87185 SC STD ENZYME DETCJ PER NZM: CPT | Performed by: PODIATRIST

## 2019-05-16 PROCEDURE — 87205 SMEAR GRAM STAIN: CPT | Performed by: PODIATRIST

## 2019-05-16 PROCEDURE — 87176 TISSUE HOMOGENIZATION CULTR: CPT | Performed by: PODIATRIST

## 2019-05-16 PROCEDURE — 87070 CULTURE OTHR SPECIMN AEROBIC: CPT | Performed by: PODIATRIST

## 2019-05-19 LAB
B-LACTAMASE USUAL SUSC ISLT: POSITIVE
BACTERIA SPEC AEROBE CULT: ABNORMAL
BACTERIA SPEC AEROBE CULT: ABNORMAL
GRAM STN SPEC: ABNORMAL

## 2019-05-21 LAB — BACTERIA SPEC ANAEROBE CULT: NORMAL

## 2019-05-23 ENCOUNTER — TRANSCRIBE ORDERS (OUTPATIENT)
Dept: PODIATRY | Facility: CLINIC | Age: 64
End: 2019-05-23

## 2019-05-23 ENCOUNTER — HOSPITAL ENCOUNTER (OUTPATIENT)
Dept: GENERAL RADIOLOGY | Facility: HOSPITAL | Age: 64
Discharge: HOME | End: 2019-05-23
Admitting: PODIATRIST

## 2019-05-23 DIAGNOSIS — M86.9 DIABETIC FOOT ULCER WITH OSTEOMYELITIS (HCC): ICD-10-CM

## 2019-05-23 DIAGNOSIS — E11.69 DIABETIC FOOT ULCER WITH OSTEOMYELITIS (HCC): ICD-10-CM

## 2019-05-23 DIAGNOSIS — L97.509 DIABETIC FOOT ULCER WITH OSTEOMYELITIS (HCC): Primary | ICD-10-CM

## 2019-05-23 DIAGNOSIS — L97.509 DIABETIC FOOT ULCER WITH OSTEOMYELITIS (HCC): ICD-10-CM

## 2019-05-23 DIAGNOSIS — M86.9 DIABETIC FOOT ULCER WITH OSTEOMYELITIS (HCC): Primary | ICD-10-CM

## 2019-05-23 DIAGNOSIS — E11.621 DIABETIC FOOT ULCER WITH OSTEOMYELITIS (HCC): ICD-10-CM

## 2019-05-23 DIAGNOSIS — E11.69 DIABETIC FOOT ULCER WITH OSTEOMYELITIS (HCC): Primary | ICD-10-CM

## 2019-05-23 DIAGNOSIS — E11.621 DIABETIC FOOT ULCER WITH OSTEOMYELITIS (HCC): Primary | ICD-10-CM

## 2019-05-23 PROCEDURE — 73630 X-RAY EXAM OF FOOT: CPT

## 2019-05-24 ENCOUNTER — OFFICE VISIT (OUTPATIENT)
Dept: WOUND CARE | Facility: HOSPITAL | Age: 64
End: 2019-05-24

## 2019-05-28 ENCOUNTER — TRANSCRIBE ORDERS (OUTPATIENT)
Dept: ADMINISTRATIVE | Facility: HOSPITAL | Age: 64
End: 2019-05-28

## 2019-05-28 DIAGNOSIS — M86.9 OSTEOMYELITIS OF ANKLE AND FOOT (HCC): Primary | ICD-10-CM

## 2019-06-03 ENCOUNTER — OFFICE VISIT (OUTPATIENT)
Dept: WOUND CARE | Facility: HOSPITAL | Age: 64
End: 2019-06-03

## 2019-06-03 ENCOUNTER — APPOINTMENT (OUTPATIENT)
Dept: MRI IMAGING | Facility: HOSPITAL | Age: 64
End: 2019-06-03

## 2019-06-10 ENCOUNTER — HOSPITAL ENCOUNTER (OUTPATIENT)
Dept: MRI IMAGING | Facility: HOSPITAL | Age: 64
Discharge: HOME | End: 2019-06-10
Admitting: NURSE PRACTITIONER

## 2019-06-10 ENCOUNTER — OFFICE VISIT (OUTPATIENT)
Dept: WOUND CARE | Facility: HOSPITAL | Age: 64
End: 2019-06-10

## 2019-06-10 DIAGNOSIS — M86.9 OSTEOMYELITIS OF ANKLE AND FOOT (HCC): ICD-10-CM

## 2019-06-10 PROCEDURE — 73718 MRI LOWER EXTREMITY W/O DYE: CPT

## 2019-06-17 ENCOUNTER — OFFICE VISIT (OUTPATIENT)
Dept: WOUND CARE | Facility: HOSPITAL | Age: 64
End: 2019-06-17

## 2019-06-24 ENCOUNTER — LAB REQUISITION (OUTPATIENT)
Dept: LAB | Facility: HOSPITAL | Age: 64
End: 2019-06-24

## 2019-06-24 ENCOUNTER — OFFICE VISIT (OUTPATIENT)
Dept: WOUND CARE | Facility: HOSPITAL | Age: 64
End: 2019-06-24

## 2019-06-24 DIAGNOSIS — Z00.00 ENCOUNTER FOR GENERAL ADULT MEDICAL EXAMINATION WITHOUT ABNORMAL FINDINGS: ICD-10-CM

## 2019-06-24 PROCEDURE — 87186 SC STD MICRODIL/AGAR DIL: CPT | Performed by: NURSE PRACTITIONER

## 2019-06-24 PROCEDURE — 87075 CULTR BACTERIA EXCEPT BLOOD: CPT | Performed by: NURSE PRACTITIONER

## 2019-06-24 PROCEDURE — 87147 CULTURE TYPE IMMUNOLOGIC: CPT | Performed by: NURSE PRACTITIONER

## 2019-06-24 PROCEDURE — 87185 SC STD ENZYME DETCJ PER NZM: CPT | Performed by: NURSE PRACTITIONER

## 2019-06-24 PROCEDURE — 87077 CULTURE AEROBIC IDENTIFY: CPT | Performed by: NURSE PRACTITIONER

## 2019-06-24 PROCEDURE — 87176 TISSUE HOMOGENIZATION CULTR: CPT | Performed by: NURSE PRACTITIONER

## 2019-06-24 PROCEDURE — 87070 CULTURE OTHR SPECIMN AEROBIC: CPT | Performed by: NURSE PRACTITIONER

## 2019-06-24 PROCEDURE — 87205 SMEAR GRAM STAIN: CPT | Performed by: NURSE PRACTITIONER

## 2019-06-27 LAB
B-LACTAMASE USUAL SUSC ISLT: POSITIVE
BACTERIA SPEC AEROBE CULT: ABNORMAL
BACTERIA SPEC ANAEROBE CULT: ABNORMAL
GRAM STN SPEC: ABNORMAL

## 2019-07-01 ENCOUNTER — OFFICE VISIT (OUTPATIENT)
Dept: WOUND CARE | Facility: HOSPITAL | Age: 64
End: 2019-07-01

## 2019-07-11 ENCOUNTER — OFFICE VISIT (OUTPATIENT)
Dept: WOUND CARE | Facility: HOSPITAL | Age: 64
End: 2019-07-11

## 2019-07-11 PROCEDURE — G0463 HOSPITAL OUTPT CLINIC VISIT: HCPCS

## 2019-07-15 ENCOUNTER — TRANSCRIBE ORDERS (OUTPATIENT)
Dept: ADMINISTRATIVE | Facility: HOSPITAL | Age: 64
End: 2019-07-15

## 2019-07-19 ENCOUNTER — APPOINTMENT (OUTPATIENT)
Dept: WOUND CARE | Facility: HOSPITAL | Age: 64
End: 2019-07-19

## 2019-07-22 ENCOUNTER — OFFICE VISIT (OUTPATIENT)
Dept: WOUND CARE | Facility: HOSPITAL | Age: 64
End: 2019-07-22

## 2019-07-22 ENCOUNTER — LAB (OUTPATIENT)
Dept: LAB | Facility: HOSPITAL | Age: 64
End: 2019-07-22

## 2019-07-22 ENCOUNTER — APPOINTMENT (OUTPATIENT)
Dept: GENERAL RADIOLOGY | Facility: HOSPITAL | Age: 64
End: 2019-07-22

## 2019-07-22 ENCOUNTER — HOSPITAL ENCOUNTER (INPATIENT)
Facility: HOSPITAL | Age: 64
LOS: 4 days | Discharge: HOME | End: 2019-07-26
Attending: EMERGENCY MEDICINE | Admitting: PODIATRIST

## 2019-07-22 ENCOUNTER — TRANSCRIBE ORDERS (OUTPATIENT)
Dept: ADMINISTRATIVE | Facility: HOSPITAL | Age: 64
End: 2019-07-22

## 2019-07-22 DIAGNOSIS — N17.9 AKI (ACUTE KIDNEY INJURY) (HCC): ICD-10-CM

## 2019-07-22 DIAGNOSIS — N40.1 HYPERPLASIA OF PROSTATE WITH URINARY OBSTRUCTION: Primary | ICD-10-CM

## 2019-07-22 DIAGNOSIS — L08.9 DIABETIC FOOT INFECTION (HCC): Primary | ICD-10-CM

## 2019-07-22 DIAGNOSIS — E11.49 TYPE II DIABETES MELLITUS WITH NEUROLOGICAL MANIFESTATIONS (HCC): Primary | ICD-10-CM

## 2019-07-22 DIAGNOSIS — E11.49 TYPE II DIABETES MELLITUS WITH NEUROLOGICAL MANIFESTATIONS (HCC): ICD-10-CM

## 2019-07-22 DIAGNOSIS — E11.628 DIABETIC FOOT INFECTION (HCC): Primary | ICD-10-CM

## 2019-07-22 DIAGNOSIS — L02.611 FOOT ABSCESS, RIGHT: ICD-10-CM

## 2019-07-22 DIAGNOSIS — N13.8 HYPERPLASIA OF PROSTATE WITH URINARY OBSTRUCTION: Primary | ICD-10-CM

## 2019-07-22 LAB
ALBUMIN SERPL-MCNC: 4.5 G/DL (ref 3.5–5)
ALBUMIN/GLOB SERPL: 1.5 G/DL (ref 1.1–2.5)
ALP SERPL-CCNC: 72 U/L (ref 24–120)
ALT SERPL W P-5'-P-CCNC: 22 U/L (ref 0–54)
ANION GAP SERPL CALCULATED.3IONS-SCNC: 12 MMOL/L (ref 4–13)
ARTICHOKE IGE QN: 117 MG/DL (ref 0–99)
AST SERPL-CCNC: 27 U/L (ref 7–45)
BASOPHILS # BLD AUTO: 0.04 10*3/MM3 (ref 0–0.2)
BASOPHILS NFR BLD AUTO: 0.5 % (ref 0–2)
BILIRUB SERPL-MCNC: 0.6 MG/DL (ref 0.1–1)
BUN BLD-MCNC: 42 MG/DL (ref 5–21)
BUN/CREAT SERPL: 25 (ref 7–25)
CALCIUM SPEC-SCNC: 9.7 MG/DL (ref 8.4–10.4)
CHLORIDE SERPL-SCNC: 101 MMOL/L (ref 98–110)
CHOLEST SERPL-MCNC: 195 MG/DL (ref 130–200)
CO2 SERPL-SCNC: 29 MMOL/L (ref 24–31)
CREAT BLD-MCNC: 1.68 MG/DL (ref 0.5–1.4)
CRP SERPL-MCNC: 2.04 MG/DL (ref 0–0.99)
D-LACTATE SERPL-SCNC: 1.5 MMOL/L (ref 0.5–2)
DEPRECATED RDW RBC AUTO: 43.3 FL (ref 40–54)
EOSINOPHIL # BLD AUTO: 0.53 10*3/MM3 (ref 0–0.7)
EOSINOPHIL NFR BLD AUTO: 6.3 % (ref 0–4)
ERYTHROCYTE [DISTWIDTH] IN BLOOD BY AUTOMATED COUNT: 13.6 % (ref 12–15)
ERYTHROCYTE [SEDIMENTATION RATE] IN BLOOD: 33 MM/HR (ref 0–15)
GFR SERPL CREATININE-BSD FRML MDRD: 41 ML/MIN/1.73
GLOBULIN UR ELPH-MCNC: 3.1 GM/DL
GLUCOSE BLD-MCNC: 66 MG/DL (ref 70–100)
GLUCOSE BLDC GLUCOMTR-MCNC: 112 MG/DL (ref 70–130)
GLUCOSE BLDC GLUCOMTR-MCNC: 52 MG/DL (ref 70–130)
GLUCOSE BLDC GLUCOMTR-MCNC: 58 MG/DL (ref 70–130)
GLUCOSE BLDC GLUCOMTR-MCNC: 73 MG/DL (ref 70–130)
HBA1C MFR BLD: 9.1 %
HCT VFR BLD AUTO: 36 % (ref 40–52)
HDLC SERPL-MCNC: 43 MG/DL
HGB BLD-MCNC: 12 G/DL (ref 14–18)
HOLD SPECIMEN: NORMAL
HOLD SPECIMEN: NORMAL
IMM GRANULOCYTES # BLD AUTO: 0.05 10*3/MM3 (ref 0–0.05)
IMM GRANULOCYTES NFR BLD AUTO: 0.6 % (ref 0–5)
LDLC/HDLC SERPL: 2.89 {RATIO}
LYMPHOCYTES # BLD AUTO: 2.49 10*3/MM3 (ref 0.72–4.86)
LYMPHOCYTES NFR BLD AUTO: 29.6 % (ref 15–45)
MCH RBC QN AUTO: 28.8 PG (ref 28–32)
MCHC RBC AUTO-ENTMCNC: 33.3 G/DL (ref 33–36)
MCV RBC AUTO: 86.5 FL (ref 82–95)
MONOCYTES # BLD AUTO: 0.82 10*3/MM3 (ref 0.19–1.3)
MONOCYTES NFR BLD AUTO: 9.8 % (ref 4–12)
NEUTROPHILS # BLD AUTO: 4.48 10*3/MM3 (ref 1.87–8.4)
NEUTROPHILS NFR BLD AUTO: 53.2 % (ref 39–78)
NRBC BLD AUTO-RTO: 0 /100 WBC (ref 0–0.2)
PLATELET # BLD AUTO: 293 10*3/MM3 (ref 130–400)
PMV BLD AUTO: 10.2 FL (ref 6–12)
POTASSIUM BLD-SCNC: 3.7 MMOL/L (ref 3.5–5.3)
PROT SERPL-MCNC: 7.6 G/DL (ref 6.3–8.7)
PSA SERPL-MCNC: 0.21 NG/ML (ref 0–4)
RBC # BLD AUTO: 4.16 10*6/MM3 (ref 4.8–5.9)
SODIUM BLD-SCNC: 142 MMOL/L (ref 135–145)
TRIGL SERPL-MCNC: 139 MG/DL (ref 0–149)
TSH SERPL DL<=0.05 MIU/L-ACNC: 1.37 MIU/ML (ref 0.47–4.68)
WBC NRBC COR # BLD: 8.41 10*3/MM3 (ref 4.8–10.8)
WHOLE BLOOD HOLD SPECIMEN: NORMAL

## 2019-07-22 PROCEDURE — 87077 CULTURE AEROBIC IDENTIFY: CPT | Performed by: EMERGENCY MEDICINE

## 2019-07-22 PROCEDURE — 25010000002 ENOXAPARIN PER 10 MG: Performed by: INTERNAL MEDICINE

## 2019-07-22 PROCEDURE — 85651 RBC SED RATE NONAUTOMATED: CPT | Performed by: EMERGENCY MEDICINE

## 2019-07-22 PROCEDURE — 84443 ASSAY THYROID STIM HORMONE: CPT | Performed by: FAMILY MEDICINE

## 2019-07-22 PROCEDURE — 82570 ASSAY OF URINE CREATININE: CPT | Performed by: FAMILY MEDICINE

## 2019-07-22 PROCEDURE — 87070 CULTURE OTHR SPECIMN AEROBIC: CPT | Performed by: EMERGENCY MEDICINE

## 2019-07-22 PROCEDURE — 87040 BLOOD CULTURE FOR BACTERIA: CPT | Performed by: EMERGENCY MEDICINE

## 2019-07-22 PROCEDURE — 87186 SC STD MICRODIL/AGAR DIL: CPT | Performed by: EMERGENCY MEDICINE

## 2019-07-22 PROCEDURE — 85025 COMPLETE CBC W/AUTO DIFF WBC: CPT | Performed by: FAMILY MEDICINE

## 2019-07-22 PROCEDURE — 83605 ASSAY OF LACTIC ACID: CPT | Performed by: EMERGENCY MEDICINE

## 2019-07-22 PROCEDURE — 82962 GLUCOSE BLOOD TEST: CPT

## 2019-07-22 PROCEDURE — 86140 C-REACTIVE PROTEIN: CPT | Performed by: EMERGENCY MEDICINE

## 2019-07-22 PROCEDURE — 80053 COMPREHEN METABOLIC PANEL: CPT | Performed by: FAMILY MEDICINE

## 2019-07-22 PROCEDURE — G0463 HOSPITAL OUTPT CLINIC VISIT: HCPCS

## 2019-07-22 PROCEDURE — 99283 EMERGENCY DEPT VISIT LOW MDM: CPT

## 2019-07-22 PROCEDURE — 80061 LIPID PANEL: CPT | Performed by: FAMILY MEDICINE

## 2019-07-22 PROCEDURE — 25010000002 VANCOMYCIN 1 G RECONSTITUTED SOLUTION 1 EACH VIAL: Performed by: EMERGENCY MEDICINE

## 2019-07-22 PROCEDURE — 87205 SMEAR GRAM STAIN: CPT | Performed by: EMERGENCY MEDICINE

## 2019-07-22 PROCEDURE — 82043 UR ALBUMIN QUANTITATIVE: CPT | Performed by: FAMILY MEDICINE

## 2019-07-22 PROCEDURE — 36415 COLL VENOUS BLD VENIPUNCTURE: CPT | Performed by: FAMILY MEDICINE

## 2019-07-22 PROCEDURE — 73630 X-RAY EXAM OF FOOT: CPT

## 2019-07-22 PROCEDURE — 83036 HEMOGLOBIN GLYCOSYLATED A1C: CPT | Performed by: FAMILY MEDICINE

## 2019-07-22 PROCEDURE — G0103 PSA SCREENING: HCPCS | Performed by: FAMILY MEDICINE

## 2019-07-22 RX ORDER — LEVOFLOXACIN 5 MG/ML
750 INJECTION, SOLUTION INTRAVENOUS EVERY 24 HOURS
Status: DISCONTINUED | OUTPATIENT
Start: 2019-07-23 | End: 2019-07-25 | Stop reason: CLARIF

## 2019-07-22 RX ORDER — NICOTINE POLACRILEX 4 MG
15 LOZENGE BUCCAL
Status: DISCONTINUED | OUTPATIENT
Start: 2019-07-22 | End: 2019-07-26 | Stop reason: HOSPADM

## 2019-07-22 RX ORDER — OXYCODONE AND ACETAMINOPHEN 10; 325 MG/1; MG/1
0.5 TABLET ORAL 4 TIMES DAILY PRN
Status: DISCONTINUED | OUTPATIENT
Start: 2019-07-22 | End: 2019-07-23

## 2019-07-22 RX ORDER — ROSUVASTATIN CALCIUM 20 MG/1
40 TABLET, COATED ORAL DAILY
Status: DISCONTINUED | OUTPATIENT
Start: 2019-07-23 | End: 2019-07-26 | Stop reason: HOSPADM

## 2019-07-22 RX ORDER — SODIUM CHLORIDE 0.9 % (FLUSH) 0.9 %
3 SYRINGE (ML) INJECTION EVERY 12 HOURS SCHEDULED
Status: DISCONTINUED | OUTPATIENT
Start: 2019-07-22 | End: 2019-07-26 | Stop reason: HOSPADM

## 2019-07-22 RX ORDER — BUMETANIDE 2 MG/1
2 TABLET ORAL 2 TIMES DAILY PRN
Status: DISCONTINUED | OUTPATIENT
Start: 2019-07-22 | End: 2019-07-26 | Stop reason: HOSPADM

## 2019-07-22 RX ORDER — DULOXETIN HYDROCHLORIDE 30 MG/1
60 CAPSULE, DELAYED RELEASE ORAL DAILY
Status: DISCONTINUED | OUTPATIENT
Start: 2019-07-23 | End: 2019-07-26 | Stop reason: HOSPADM

## 2019-07-22 RX ORDER — ONDANSETRON 4 MG/1
4 TABLET, ORALLY DISINTEGRATING ORAL 4 TIMES DAILY PRN
Status: DISCONTINUED | OUTPATIENT
Start: 2019-07-22 | End: 2019-07-26 | Stop reason: HOSPADM

## 2019-07-22 RX ORDER — LOSARTAN POTASSIUM 50 MG/1
100 TABLET ORAL
Status: DISCONTINUED | OUTPATIENT
Start: 2019-07-23 | End: 2019-07-26 | Stop reason: HOSPADM

## 2019-07-22 RX ORDER — SODIUM CHLORIDE 0.9 % (FLUSH) 0.9 %
10 SYRINGE (ML) INJECTION AS NEEDED
Status: DISCONTINUED | OUTPATIENT
Start: 2019-07-22 | End: 2019-07-26 | Stop reason: HOSPADM

## 2019-07-22 RX ORDER — LACTULOSE 20 G/30ML
20 SOLUTION ORAL 3 TIMES DAILY
Status: DISCONTINUED | OUTPATIENT
Start: 2019-07-22 | End: 2019-07-26 | Stop reason: HOSPADM

## 2019-07-22 RX ORDER — LEVOFLOXACIN 5 MG/ML
750 INJECTION, SOLUTION INTRAVENOUS ONCE
Status: COMPLETED | OUTPATIENT
Start: 2019-07-22 | End: 2019-07-23

## 2019-07-22 RX ORDER — PANTOPRAZOLE SODIUM 40 MG/1
40 TABLET, DELAYED RELEASE ORAL DAILY
Status: DISCONTINUED | OUTPATIENT
Start: 2019-07-23 | End: 2019-07-26 | Stop reason: HOSPADM

## 2019-07-22 RX ORDER — SODIUM CHLORIDE 0.9 % (FLUSH) 0.9 %
3-10 SYRINGE (ML) INJECTION AS NEEDED
Status: DISCONTINUED | OUTPATIENT
Start: 2019-07-22 | End: 2019-07-26 | Stop reason: HOSPADM

## 2019-07-22 RX ADMIN — VANCOMYCIN HYDROCHLORIDE 2000 MG: 1 INJECTION, POWDER, LYOPHILIZED, FOR SOLUTION INTRAVENOUS at 20:22

## 2019-07-22 RX ADMIN — LACTULOSE 20 G: 20 SOLUTION ORAL at 22:27

## 2019-07-22 RX ADMIN — OXYCODONE HYDROCHLORIDE AND ACETAMINOPHEN 0.5 TABLET: 10; 325 TABLET ORAL at 22:27

## 2019-07-22 RX ADMIN — ENOXAPARIN SODIUM 40 MG: 40 INJECTION SUBCUTANEOUS at 22:27

## 2019-07-22 RX ADMIN — SODIUM CHLORIDE 500 ML: 9 INJECTION, SOLUTION INTRAVENOUS at 20:24

## 2019-07-23 PROBLEM — IMO0001 INSULIN DEPENDENT DIABETES MELLITUS: Status: ACTIVE | Noted: 2017-02-06

## 2019-07-23 PROBLEM — N18.9 ANEMIA DUE TO CHRONIC KIDNEY DISEASE: Status: ACTIVE | Noted: 2019-07-23

## 2019-07-23 PROBLEM — N17.9 AKI (ACUTE KIDNEY INJURY): Status: ACTIVE | Noted: 2019-07-23

## 2019-07-23 PROBLEM — D63.1 ANEMIA DUE TO CHRONIC KIDNEY DISEASE: Status: ACTIVE | Noted: 2019-07-23

## 2019-07-23 LAB
ALBUMIN SERPL-MCNC: 3.5 G/DL (ref 3.5–5)
ALBUMIN/GLOB SERPL: 1.5 G/DL (ref 1.1–2.5)
ALP SERPL-CCNC: 73 U/L (ref 24–120)
ALT SERPL W P-5'-P-CCNC: 21 U/L (ref 0–54)
ANION GAP SERPL CALCULATED.3IONS-SCNC: 7 MMOL/L (ref 4–13)
AST SERPL-CCNC: 17 U/L (ref 7–45)
BASOPHILS # BLD AUTO: 0.03 10*3/MM3 (ref 0–0.2)
BASOPHILS NFR BLD AUTO: 0.4 % (ref 0–2)
BILIRUB SERPL-MCNC: 0.4 MG/DL (ref 0.1–1)
BUN BLD-MCNC: 34 MG/DL (ref 5–21)
BUN/CREAT SERPL: 26.8 (ref 7–25)
CALCIUM SPEC-SCNC: 8.9 MG/DL (ref 8.4–10.4)
CHLORIDE SERPL-SCNC: 102 MMOL/L (ref 98–110)
CO2 SERPL-SCNC: 27 MMOL/L (ref 24–31)
CREAT 24H UR-MCNC: 100.6 MG/DL
CREAT BLD-MCNC: 1.27 MG/DL (ref 0.5–1.4)
CRP SERPL-MCNC: 1.67 MG/DL (ref 0–0.99)
DEPRECATED RDW RBC AUTO: 43.4 FL (ref 40–54)
EOSINOPHIL # BLD AUTO: 0.43 10*3/MM3 (ref 0–0.7)
EOSINOPHIL NFR BLD AUTO: 5.3 % (ref 0–4)
ERYTHROCYTE [DISTWIDTH] IN BLOOD BY AUTOMATED COUNT: 13.8 % (ref 12–15)
GFR SERPL CREATININE-BSD FRML MDRD: 57 ML/MIN/1.73
GLOBULIN UR ELPH-MCNC: 2.4 GM/DL
GLUCOSE BLD-MCNC: 260 MG/DL (ref 70–100)
GLUCOSE BLDC GLUCOMTR-MCNC: 272 MG/DL (ref 70–130)
GLUCOSE BLDC GLUCOMTR-MCNC: 283 MG/DL (ref 70–130)
GLUCOSE BLDC GLUCOMTR-MCNC: 308 MG/DL (ref 70–130)
GLUCOSE BLDC GLUCOMTR-MCNC: 391 MG/DL (ref 70–130)
HCT VFR BLD AUTO: 32 % (ref 40–52)
HGB BLD-MCNC: 10.6 G/DL (ref 14–18)
IMM GRANULOCYTES # BLD AUTO: 0.04 10*3/MM3 (ref 0–0.05)
IMM GRANULOCYTES NFR BLD AUTO: 0.5 % (ref 0–5)
LYMPHOCYTES # BLD AUTO: 1.27 10*3/MM3 (ref 0.72–4.86)
LYMPHOCYTES NFR BLD AUTO: 15.8 % (ref 15–45)
MCH RBC QN AUTO: 28.4 PG (ref 28–32)
MCHC RBC AUTO-ENTMCNC: 33.1 G/DL (ref 33–36)
MCV RBC AUTO: 85.8 FL (ref 82–95)
MICROALBUMIN UR-MCNC: 92.5 UG/ML
MICROALBUMIN/CREAT UR: 91.9 MG/G CREAT (ref 0–30)
MONOCYTES # BLD AUTO: 0.63 10*3/MM3 (ref 0.19–1.3)
MONOCYTES NFR BLD AUTO: 7.8 % (ref 4–12)
NEUTROPHILS # BLD AUTO: 5.66 10*3/MM3 (ref 1.87–8.4)
NEUTROPHILS NFR BLD AUTO: 70.2 % (ref 39–78)
NRBC BLD AUTO-RTO: 0 /100 WBC (ref 0–0.2)
PLATELET # BLD AUTO: 256 10*3/MM3 (ref 130–400)
PMV BLD AUTO: 11.1 FL (ref 6–12)
POTASSIUM BLD-SCNC: 4.9 MMOL/L (ref 3.5–5.3)
PROT SERPL-MCNC: 5.9 G/DL (ref 6.3–8.7)
RBC # BLD AUTO: 3.73 10*6/MM3 (ref 4.8–5.9)
SODIUM BLD-SCNC: 136 MMOL/L (ref 135–145)
WBC NRBC COR # BLD: 8.06 10*3/MM3 (ref 4.8–10.8)

## 2019-07-23 PROCEDURE — 25010000002 LEVOFLOXACIN PER 250 MG: Performed by: EMERGENCY MEDICINE

## 2019-07-23 PROCEDURE — 87077 CULTURE AEROBIC IDENTIFY: CPT | Performed by: PODIATRIST

## 2019-07-23 PROCEDURE — 86140 C-REACTIVE PROTEIN: CPT | Performed by: INTERNAL MEDICINE

## 2019-07-23 PROCEDURE — 63710000001 INSULIN DETEMIR PER 5 UNITS: Performed by: FAMILY MEDICINE

## 2019-07-23 PROCEDURE — 85025 COMPLETE CBC W/AUTO DIFF WBC: CPT | Performed by: INTERNAL MEDICINE

## 2019-07-23 PROCEDURE — 11042 DBRDMT SUBQ TIS 1ST 20SQCM/<: CPT | Performed by: PODIATRIST

## 2019-07-23 PROCEDURE — 82962 GLUCOSE BLOOD TEST: CPT

## 2019-07-23 PROCEDURE — 25010000002 MORPHINE PER 10 MG: Performed by: INTERNAL MEDICINE

## 2019-07-23 PROCEDURE — 80053 COMPREHEN METABOLIC PANEL: CPT | Performed by: INTERNAL MEDICINE

## 2019-07-23 PROCEDURE — 87070 CULTURE OTHR SPECIMN AEROBIC: CPT | Performed by: PODIATRIST

## 2019-07-23 PROCEDURE — 25010000002 LEVOFLOXACIN PER 250 MG: Performed by: INTERNAL MEDICINE

## 2019-07-23 PROCEDURE — 25010000002 VANCOMYCIN 10 G RECONSTITUTED SOLUTION: Performed by: INTERNAL MEDICINE

## 2019-07-23 PROCEDURE — 94760 N-INVAS EAR/PLS OXIMETRY 1: CPT

## 2019-07-23 PROCEDURE — 63710000001 INSULIN LISPRO (HUMAN) PER 5 UNITS: Performed by: INTERNAL MEDICINE

## 2019-07-23 PROCEDURE — 94799 UNLISTED PULMONARY SVC/PX: CPT

## 2019-07-23 PROCEDURE — 99222 1ST HOSP IP/OBS MODERATE 55: CPT | Performed by: PODIATRIST

## 2019-07-23 PROCEDURE — 87205 SMEAR GRAM STAIN: CPT | Performed by: PODIATRIST

## 2019-07-23 PROCEDURE — 0JBQ0ZZ EXCISION OF RIGHT FOOT SUBCUTANEOUS TISSUE AND FASCIA, OPEN APPROACH: ICD-10-PCS | Performed by: PODIATRIST

## 2019-07-23 PROCEDURE — 25010000002 ENOXAPARIN PER 10 MG: Performed by: INTERNAL MEDICINE

## 2019-07-23 RX ORDER — GABAPENTIN 100 MG/1
100 CAPSULE ORAL 2 TIMES DAILY
Status: DISCONTINUED | OUTPATIENT
Start: 2019-07-23 | End: 2019-07-26 | Stop reason: HOSPADM

## 2019-07-23 RX ORDER — CEPHALEXIN 500 MG/1
500 CAPSULE ORAL 2 TIMES DAILY
COMMUNITY
Start: 2019-07-15 | End: 2019-07-26 | Stop reason: HOSPADM

## 2019-07-23 RX ORDER — COLCHICINE 0.6 MG/1
0.6 TABLET ORAL 2 TIMES DAILY PRN
COMMUNITY
End: 2019-07-26 | Stop reason: HOSPADM

## 2019-07-23 RX ORDER — OXYCODONE AND ACETAMINOPHEN 10; 325 MG/1; MG/1
1 TABLET ORAL 4 TIMES DAILY PRN
Status: DISCONTINUED | OUTPATIENT
Start: 2019-07-23 | End: 2019-07-26 | Stop reason: HOSPADM

## 2019-07-23 RX ORDER — MORPHINE SULFATE 2 MG/ML
2 INJECTION, SOLUTION INTRAMUSCULAR; INTRAVENOUS EVERY 4 HOURS PRN
Status: DISCONTINUED | OUTPATIENT
Start: 2019-07-23 | End: 2019-07-25

## 2019-07-23 RX ORDER — TRAZODONE HYDROCHLORIDE 50 MG/1
25-50 TABLET ORAL NIGHTLY PRN
COMMUNITY
End: 2020-01-27 | Stop reason: ALTCHOICE

## 2019-07-23 RX ORDER — MIDODRINE HYDROCHLORIDE 5 MG/1
10 TABLET ORAL 3 TIMES DAILY
COMMUNITY
End: 2019-07-26 | Stop reason: HOSPADM

## 2019-07-23 RX ORDER — GABAPENTIN 300 MG/1
300 CAPSULE ORAL 3 TIMES DAILY
Status: ON HOLD | COMMUNITY
End: 2021-02-09

## 2019-07-23 RX ADMIN — LEVOFLOXACIN 750 MG: 5 INJECTION, SOLUTION INTRAVENOUS at 22:40

## 2019-07-23 RX ADMIN — PANTOPRAZOLE SODIUM 40 MG: 40 TABLET, DELAYED RELEASE ORAL at 08:28

## 2019-07-23 RX ADMIN — OXYCODONE HYDROCHLORIDE AND ACETAMINOPHEN 0.5 TABLET: 10; 325 TABLET ORAL at 12:05

## 2019-07-23 RX ADMIN — INSULIN LISPRO 4 UNITS: 100 INJECTION, SOLUTION INTRAVENOUS; SUBCUTANEOUS at 12:00

## 2019-07-23 RX ADMIN — OXYCODONE HYDROCHLORIDE AND ACETAMINOPHEN 0.5 TABLET: 10; 325 TABLET ORAL at 04:43

## 2019-07-23 RX ADMIN — ENOXAPARIN SODIUM 40 MG: 40 INJECTION SUBCUTANEOUS at 20:31

## 2019-07-23 RX ADMIN — INSULIN LISPRO 5 UNITS: 100 INJECTION, SOLUTION INTRAVENOUS; SUBCUTANEOUS at 08:29

## 2019-07-23 RX ADMIN — INSULIN DETEMIR 10 UNITS: 100 INJECTION, SOLUTION SUBCUTANEOUS at 20:32

## 2019-07-23 RX ADMIN — INSULIN LISPRO 6 UNITS: 100 INJECTION, SOLUTION INTRAVENOUS; SUBCUTANEOUS at 20:31

## 2019-07-23 RX ADMIN — SODIUM CHLORIDE, PRESERVATIVE FREE 3 ML: 5 INJECTION INTRAVENOUS at 20:32

## 2019-07-23 RX ADMIN — ONDANSETRON 4 MG: 4 TABLET, ORALLY DISINTEGRATING ORAL at 08:36

## 2019-07-23 RX ADMIN — LOSARTAN POTASSIUM 100 MG: 50 TABLET ORAL at 08:28

## 2019-07-23 RX ADMIN — INSULIN LISPRO 4 UNITS: 100 INJECTION, SOLUTION INTRAVENOUS; SUBCUTANEOUS at 17:44

## 2019-07-23 RX ADMIN — LEVOFLOXACIN 750 MG: 750 INJECTION, SOLUTION INTRAVENOUS at 00:35

## 2019-07-23 RX ADMIN — VANCOMYCIN HYDROCHLORIDE 1250 MG: 10 INJECTION, POWDER, LYOPHILIZED, FOR SOLUTION INTRAVENOUS at 08:36

## 2019-07-23 RX ADMIN — SODIUM CHLORIDE, PRESERVATIVE FREE 3 ML: 5 INJECTION INTRAVENOUS at 08:36

## 2019-07-23 RX ADMIN — GABAPENTIN 100 MG: 100 CAPSULE ORAL at 12:37

## 2019-07-23 RX ADMIN — DULOXETINE HYDROCHLORIDE 60 MG: 30 CAPSULE, DELAYED RELEASE ORAL at 08:28

## 2019-07-23 RX ADMIN — GABAPENTIN 100 MG: 100 CAPSULE ORAL at 20:31

## 2019-07-23 RX ADMIN — ROSUVASTATIN CALCIUM 40 MG: 20 TABLET, FILM COATED ORAL at 08:28

## 2019-07-23 RX ADMIN — VANCOMYCIN HYDROCHLORIDE 1250 MG: 10 INJECTION, POWDER, LYOPHILIZED, FOR SOLUTION INTRAVENOUS at 20:32

## 2019-07-23 RX ADMIN — OXYCODONE HYDROCHLORIDE AND ACETAMINOPHEN 0.5 TABLET: 10; 325 TABLET ORAL at 19:25

## 2019-07-23 RX ADMIN — MORPHINE SULFATE 2 MG: 2 INJECTION, SOLUTION INTRAMUSCULAR; INTRAVENOUS at 21:33

## 2019-07-24 ENCOUNTER — APPOINTMENT (OUTPATIENT)
Dept: MRI IMAGING | Facility: HOSPITAL | Age: 64
End: 2019-07-24

## 2019-07-24 ENCOUNTER — APPOINTMENT (OUTPATIENT)
Dept: GENERAL RADIOLOGY | Facility: HOSPITAL | Age: 64
End: 2019-07-24

## 2019-07-24 PROBLEM — E66.01 MORBID OBESITY WITH BMI OF 40.0-44.9, ADULT (HCC): Status: ACTIVE | Noted: 2019-07-24

## 2019-07-24 LAB
GLUCOSE BLDC GLUCOMTR-MCNC: 279 MG/DL (ref 70–130)
GLUCOSE BLDC GLUCOMTR-MCNC: 313 MG/DL (ref 70–130)
GLUCOSE BLDC GLUCOMTR-MCNC: 379 MG/DL (ref 70–130)
GLUCOSE BLDC GLUCOMTR-MCNC: 418 MG/DL (ref 70–130)
VANCOMYCIN TROUGH SERPL-MCNC: 15.49 MCG/ML (ref 10–20)

## 2019-07-24 PROCEDURE — 25010000002 LEVOFLOXACIN PER 250 MG: Performed by: INTERNAL MEDICINE

## 2019-07-24 PROCEDURE — 63710000001 INSULIN DETEMIR PER 5 UNITS: Performed by: INTERNAL MEDICINE

## 2019-07-24 PROCEDURE — 80202 ASSAY OF VANCOMYCIN: CPT | Performed by: INTERNAL MEDICINE

## 2019-07-24 PROCEDURE — 73718 MRI LOWER EXTREMITY W/O DYE: CPT

## 2019-07-24 PROCEDURE — 73030 X-RAY EXAM OF SHOULDER: CPT

## 2019-07-24 PROCEDURE — 99231 SBSQ HOSP IP/OBS SF/LOW 25: CPT | Performed by: PODIATRIST

## 2019-07-24 PROCEDURE — 25010000002 VANCOMYCIN 10 G RECONSTITUTED SOLUTION: Performed by: INTERNAL MEDICINE

## 2019-07-24 PROCEDURE — 25010000002 ENOXAPARIN PER 10 MG: Performed by: INTERNAL MEDICINE

## 2019-07-24 PROCEDURE — 25010000002 MORPHINE PER 10 MG: Performed by: INTERNAL MEDICINE

## 2019-07-24 PROCEDURE — 63710000001 INSULIN LISPRO (HUMAN) PER 5 UNITS: Performed by: INTERNAL MEDICINE

## 2019-07-24 PROCEDURE — 63710000001 INSULIN LISPRO (HUMAN) PER 5 UNITS: Performed by: NURSE PRACTITIONER

## 2019-07-24 PROCEDURE — 82962 GLUCOSE BLOOD TEST: CPT

## 2019-07-24 RX ORDER — SACCHAROMYCES BOULARDII 250 MG
250 CAPSULE ORAL 2 TIMES DAILY
Status: DISCONTINUED | OUTPATIENT
Start: 2019-07-24 | End: 2019-07-26 | Stop reason: HOSPADM

## 2019-07-24 RX ADMIN — OXYCODONE HYDROCHLORIDE AND ACETAMINOPHEN 1 TABLET: 10; 325 TABLET ORAL at 01:16

## 2019-07-24 RX ADMIN — INSULIN LISPRO 6 UNITS: 100 INJECTION, SOLUTION INTRAVENOUS; SUBCUTANEOUS at 17:27

## 2019-07-24 RX ADMIN — OXYCODONE HYDROCHLORIDE AND ACETAMINOPHEN 1 TABLET: 10; 325 TABLET ORAL at 09:46

## 2019-07-24 RX ADMIN — DULOXETINE HYDROCHLORIDE 60 MG: 30 CAPSULE, DELAYED RELEASE ORAL at 09:35

## 2019-07-24 RX ADMIN — LEVOFLOXACIN 750 MG: 5 INJECTION, SOLUTION INTRAVENOUS at 22:26

## 2019-07-24 RX ADMIN — PANTOPRAZOLE SODIUM 40 MG: 40 TABLET, DELAYED RELEASE ORAL at 09:36

## 2019-07-24 RX ADMIN — INSULIN LISPRO 9 UNITS: 100 INJECTION, SOLUTION INTRAVENOUS; SUBCUTANEOUS at 20:46

## 2019-07-24 RX ADMIN — Medication 250 MG: at 20:47

## 2019-07-24 RX ADMIN — LOSARTAN POTASSIUM 100 MG: 50 TABLET ORAL at 09:35

## 2019-07-24 RX ADMIN — MORPHINE SULFATE 2 MG: 2 INJECTION, SOLUTION INTRAMUSCULAR; INTRAVENOUS at 05:28

## 2019-07-24 RX ADMIN — ROSUVASTATIN CALCIUM 40 MG: 20 TABLET, FILM COATED ORAL at 09:35

## 2019-07-24 RX ADMIN — VANCOMYCIN HYDROCHLORIDE 1250 MG: 10 INJECTION, POWDER, LYOPHILIZED, FOR SOLUTION INTRAVENOUS at 09:36

## 2019-07-24 RX ADMIN — INSULIN LISPRO 4 UNITS: 100 INJECTION, SOLUTION INTRAVENOUS; SUBCUTANEOUS at 13:39

## 2019-07-24 RX ADMIN — INSULIN LISPRO 5 UNITS: 100 INJECTION, SOLUTION INTRAVENOUS; SUBCUTANEOUS at 09:46

## 2019-07-24 RX ADMIN — INSULIN DETEMIR 20 UNITS: 100 INJECTION, SOLUTION SUBCUTANEOUS at 20:46

## 2019-07-24 RX ADMIN — GABAPENTIN 100 MG: 100 CAPSULE ORAL at 09:36

## 2019-07-24 RX ADMIN — OXYCODONE HYDROCHLORIDE AND ACETAMINOPHEN 1 TABLET: 10; 325 TABLET ORAL at 16:38

## 2019-07-24 RX ADMIN — VANCOMYCIN HYDROCHLORIDE 1250 MG: 10 INJECTION, POWDER, LYOPHILIZED, FOR SOLUTION INTRAVENOUS at 20:21

## 2019-07-24 RX ADMIN — OXYCODONE HYDROCHLORIDE AND ACETAMINOPHEN 1 TABLET: 10; 325 TABLET ORAL at 22:33

## 2019-07-24 RX ADMIN — ENOXAPARIN SODIUM 40 MG: 40 INJECTION SUBCUTANEOUS at 20:46

## 2019-07-24 RX ADMIN — GABAPENTIN 100 MG: 100 CAPSULE ORAL at 20:47

## 2019-07-24 RX ADMIN — MORPHINE SULFATE 2 MG: 2 INJECTION, SOLUTION INTRAMUSCULAR; INTRAVENOUS at 20:46

## 2019-07-24 RX ADMIN — SODIUM CHLORIDE, PRESERVATIVE FREE 3 ML: 5 INJECTION INTRAVENOUS at 10:20

## 2019-07-24 RX ADMIN — ONDANSETRON 4 MG: 4 TABLET, ORALLY DISINTEGRATING ORAL at 00:15

## 2019-07-25 LAB
ANION GAP SERPL CALCULATED.3IONS-SCNC: 8 MMOL/L (ref 4–13)
BUN BLD-MCNC: 27 MG/DL (ref 5–21)
BUN/CREAT SERPL: 18.1 (ref 7–25)
CALCIUM SPEC-SCNC: 9.1 MG/DL (ref 8.4–10.4)
CHLORIDE SERPL-SCNC: 102 MMOL/L (ref 98–110)
CO2 SERPL-SCNC: 27 MMOL/L (ref 24–31)
CREAT BLD-MCNC: 1.49 MG/DL (ref 0.5–1.4)
GFR SERPL CREATININE-BSD FRML MDRD: 48 ML/MIN/1.73
GLUCOSE BLD-MCNC: 262 MG/DL (ref 70–100)
GLUCOSE BLDC GLUCOMTR-MCNC: 281 MG/DL (ref 70–130)
GLUCOSE BLDC GLUCOMTR-MCNC: 340 MG/DL (ref 70–130)
GLUCOSE BLDC GLUCOMTR-MCNC: 351 MG/DL (ref 70–130)
GLUCOSE BLDC GLUCOMTR-MCNC: 379 MG/DL (ref 70–130)
POTASSIUM BLD-SCNC: 5.3 MMOL/L (ref 3.5–5.3)
SODIUM BLD-SCNC: 137 MMOL/L (ref 135–145)

## 2019-07-25 PROCEDURE — 63710000001 INSULIN REGULAR HUMAN PER 5 UNITS: Performed by: INTERNAL MEDICINE

## 2019-07-25 PROCEDURE — 25010000002 ENOXAPARIN PER 10 MG: Performed by: INTERNAL MEDICINE

## 2019-07-25 PROCEDURE — 25010000002 VANCOMYCIN 10 G RECONSTITUTED SOLUTION: Performed by: INTERNAL MEDICINE

## 2019-07-25 PROCEDURE — 25010000002 VANCOMYCIN PER 500 MG: Performed by: INTERNAL MEDICINE

## 2019-07-25 PROCEDURE — 99232 SBSQ HOSP IP/OBS MODERATE 35: CPT | Performed by: PODIATRIST

## 2019-07-25 PROCEDURE — 80048 BASIC METABOLIC PNL TOTAL CA: CPT | Performed by: INTERNAL MEDICINE

## 2019-07-25 PROCEDURE — 63710000001 INSULIN LISPRO (HUMAN) PER 5 UNITS: Performed by: NURSE PRACTITIONER

## 2019-07-25 PROCEDURE — 82962 GLUCOSE BLOOD TEST: CPT

## 2019-07-25 PROCEDURE — 63710000001 INSULIN DETEMIR PER 5 UNITS: Performed by: INTERNAL MEDICINE

## 2019-07-25 RX ORDER — VANCOMYCIN HYDROCHLORIDE 1 G/200ML
1000 INJECTION, SOLUTION INTRAVENOUS EVERY 12 HOURS
Status: DISCONTINUED | OUTPATIENT
Start: 2019-07-25 | End: 2019-07-26

## 2019-07-25 RX ORDER — LEVOFLOXACIN 750 MG/1
750 TABLET ORAL EVERY 24 HOURS
Status: DISCONTINUED | OUTPATIENT
Start: 2019-07-25 | End: 2019-07-26

## 2019-07-25 RX ADMIN — OXYCODONE HYDROCHLORIDE AND ACETAMINOPHEN 1 TABLET: 10; 325 TABLET ORAL at 04:28

## 2019-07-25 RX ADMIN — INSULIN LISPRO 6 UNITS: 100 INJECTION, SOLUTION INTRAVENOUS; SUBCUTANEOUS at 08:34

## 2019-07-25 RX ADMIN — LOSARTAN POTASSIUM 100 MG: 50 TABLET ORAL at 08:34

## 2019-07-25 RX ADMIN — Medication 250 MG: at 20:49

## 2019-07-25 RX ADMIN — LACTULOSE 20 G: 20 SOLUTION ORAL at 08:34

## 2019-07-25 RX ADMIN — DULOXETINE HYDROCHLORIDE 60 MG: 30 CAPSULE, DELAYED RELEASE ORAL at 08:34

## 2019-07-25 RX ADMIN — GABAPENTIN 100 MG: 100 CAPSULE ORAL at 08:34

## 2019-07-25 RX ADMIN — OXYCODONE HYDROCHLORIDE AND ACETAMINOPHEN 1 TABLET: 10; 325 TABLET ORAL at 11:16

## 2019-07-25 RX ADMIN — SODIUM CHLORIDE, PRESERVATIVE FREE 3 ML: 5 INJECTION INTRAVENOUS at 20:50

## 2019-07-25 RX ADMIN — PANTOPRAZOLE SODIUM 40 MG: 40 TABLET, DELAYED RELEASE ORAL at 08:34

## 2019-07-25 RX ADMIN — LEVOFLOXACIN 750 MG: 750 TABLET, FILM COATED ORAL at 20:49

## 2019-07-25 RX ADMIN — INSULIN LISPRO 4 UNITS: 100 INJECTION, SOLUTION INTRAVENOUS; SUBCUTANEOUS at 11:16

## 2019-07-25 RX ADMIN — ROSUVASTATIN CALCIUM 40 MG: 20 TABLET, FILM COATED ORAL at 11:16

## 2019-07-25 RX ADMIN — INSULIN LISPRO 8 UNITS: 100 INJECTION, SOLUTION INTRAVENOUS; SUBCUTANEOUS at 20:48

## 2019-07-25 RX ADMIN — INSULIN DETEMIR 20 UNITS: 100 INJECTION, SOLUTION SUBCUTANEOUS at 20:47

## 2019-07-25 RX ADMIN — VANCOMYCIN HYDROCHLORIDE 1000 MG: 1 INJECTION, SOLUTION INTRAVENOUS at 20:49

## 2019-07-25 RX ADMIN — VANCOMYCIN HYDROCHLORIDE 1250 MG: 10 INJECTION, POWDER, LYOPHILIZED, FOR SOLUTION INTRAVENOUS at 08:42

## 2019-07-25 RX ADMIN — INSULIN LISPRO 8 UNITS: 100 INJECTION, SOLUTION INTRAVENOUS; SUBCUTANEOUS at 17:33

## 2019-07-25 RX ADMIN — GABAPENTIN 100 MG: 100 CAPSULE ORAL at 20:49

## 2019-07-25 RX ADMIN — INSULIN HUMAN 50 UNITS: 100 INJECTION, SOLUTION PARENTERAL at 17:33

## 2019-07-25 RX ADMIN — Medication 250 MG: at 08:33

## 2019-07-25 RX ADMIN — ENOXAPARIN SODIUM 40 MG: 40 INJECTION SUBCUTANEOUS at 20:49

## 2019-07-25 RX ADMIN — LACTULOSE 20 G: 20 SOLUTION ORAL at 17:33

## 2019-07-25 RX ADMIN — OXYCODONE HYDROCHLORIDE AND ACETAMINOPHEN 1 TABLET: 10; 325 TABLET ORAL at 19:56

## 2019-07-26 VITALS
WEIGHT: 304.4 LBS | OXYGEN SATURATION: 98 % | BODY MASS INDEX: 41.23 KG/M2 | TEMPERATURE: 97.9 F | DIASTOLIC BLOOD PRESSURE: 87 MMHG | RESPIRATION RATE: 16 BRPM | HEIGHT: 72 IN | SYSTOLIC BLOOD PRESSURE: 117 MMHG | HEART RATE: 81 BPM

## 2019-07-26 PROBLEM — A49.1 INFECTION DUE TO ENTEROCOCCUS: Status: ACTIVE | Noted: 2019-07-26

## 2019-07-26 LAB
ANION GAP SERPL CALCULATED.3IONS-SCNC: 9 MMOL/L (ref 4–13)
BACTERIA SPEC AEROBE CULT: ABNORMAL
BACTERIA SPEC AEROBE CULT: ABNORMAL
BUN BLD-MCNC: 23 MG/DL (ref 5–21)
BUN/CREAT SERPL: 16.5 (ref 7–25)
CALCIUM SPEC-SCNC: 9.3 MG/DL (ref 8.4–10.4)
CHLORIDE SERPL-SCNC: 100 MMOL/L (ref 98–110)
CO2 SERPL-SCNC: 28 MMOL/L (ref 24–31)
CREAT BLD-MCNC: 1.39 MG/DL (ref 0.5–1.4)
GFR SERPL CREATININE-BSD FRML MDRD: 52 ML/MIN/1.73
GLUCOSE BLD-MCNC: 173 MG/DL (ref 70–100)
GLUCOSE BLDC GLUCOMTR-MCNC: 137 MG/DL (ref 70–130)
GLUCOSE BLDC GLUCOMTR-MCNC: 232 MG/DL (ref 70–130)
GLUCOSE BLDC GLUCOMTR-MCNC: 235 MG/DL (ref 70–130)
GRAM STN SPEC: ABNORMAL
POTASSIUM BLD-SCNC: 4.7 MMOL/L (ref 3.5–5.3)
SODIUM BLD-SCNC: 137 MMOL/L (ref 135–145)

## 2019-07-26 PROCEDURE — 63710000001 INSULIN REGULAR HUMAN PER 5 UNITS: Performed by: INTERNAL MEDICINE

## 2019-07-26 PROCEDURE — 82962 GLUCOSE BLOOD TEST: CPT

## 2019-07-26 PROCEDURE — 99231 SBSQ HOSP IP/OBS SF/LOW 25: CPT | Performed by: PODIATRIST

## 2019-07-26 PROCEDURE — 63710000001 INSULIN LISPRO (HUMAN) PER 5 UNITS: Performed by: NURSE PRACTITIONER

## 2019-07-26 PROCEDURE — 80048 BASIC METABOLIC PNL TOTAL CA: CPT | Performed by: INTERNAL MEDICINE

## 2019-07-26 PROCEDURE — 25010000002 MEROPENEM PER 100 MG: Performed by: INTERNAL MEDICINE

## 2019-07-26 RX ORDER — LOSARTAN POTASSIUM 100 MG/1
100 TABLET ORAL
Qty: 30 TABLET | Refills: 2 | Status: ON HOLD | OUTPATIENT
Start: 2019-07-27 | End: 2019-10-27 | Stop reason: ALTCHOICE

## 2019-07-26 RX ORDER — SACCHAROMYCES BOULARDII 250 MG
250 CAPSULE ORAL 2 TIMES DAILY
Qty: 30 CAPSULE | Refills: 0 | Status: ON HOLD | OUTPATIENT
Start: 2019-07-26 | End: 2019-10-27 | Stop reason: SDDI

## 2019-07-26 RX ORDER — AMOXICILLIN 875 MG/1
875 TABLET, COATED ORAL 2 TIMES DAILY
Qty: 14 TABLET | Refills: 0 | Status: SHIPPED | OUTPATIENT
Start: 2019-07-26 | End: 2019-08-02

## 2019-07-26 RX ADMIN — INSULIN HUMAN 50 UNITS: 100 INJECTION, SOLUTION PARENTERAL at 08:37

## 2019-07-26 RX ADMIN — INSULIN LISPRO 4 UNITS: 100 INJECTION, SOLUTION INTRAVENOUS; SUBCUTANEOUS at 12:08

## 2019-07-26 RX ADMIN — OXYCODONE HYDROCHLORIDE AND ACETAMINOPHEN 1 TABLET: 10; 325 TABLET ORAL at 04:57

## 2019-07-26 RX ADMIN — PANTOPRAZOLE SODIUM 40 MG: 40 TABLET, DELAYED RELEASE ORAL at 08:51

## 2019-07-26 RX ADMIN — ROSUVASTATIN CALCIUM 40 MG: 20 TABLET, FILM COATED ORAL at 08:51

## 2019-07-26 RX ADMIN — DULOXETINE HYDROCHLORIDE 60 MG: 30 CAPSULE, DELAYED RELEASE ORAL at 08:51

## 2019-07-26 RX ADMIN — INSULIN HUMAN 50 UNITS: 100 INJECTION, SOLUTION PARENTERAL at 17:12

## 2019-07-26 RX ADMIN — OXYCODONE HYDROCHLORIDE AND ACETAMINOPHEN 1 TABLET: 10; 325 TABLET ORAL at 12:08

## 2019-07-26 RX ADMIN — GABAPENTIN 100 MG: 100 CAPSULE ORAL at 08:51

## 2019-07-26 RX ADMIN — INSULIN HUMAN 50 UNITS: 100 INJECTION, SOLUTION PARENTERAL at 12:08

## 2019-07-26 RX ADMIN — INSULIN LISPRO 4 UNITS: 100 INJECTION, SOLUTION INTRAVENOUS; SUBCUTANEOUS at 08:37

## 2019-07-26 RX ADMIN — LOSARTAN POTASSIUM 100 MG: 50 TABLET ORAL at 08:51

## 2019-07-26 RX ADMIN — LACTULOSE 20 G: 20 SOLUTION ORAL at 08:51

## 2019-07-26 RX ADMIN — Medication 250 MG: at 08:52

## 2019-07-26 RX ADMIN — MEROPENEM 1 G: 1 INJECTION, POWDER, FOR SOLUTION INTRAVENOUS at 11:58

## 2019-07-27 ENCOUNTER — READMISSION MANAGEMENT (OUTPATIENT)
Dept: CALL CENTER | Facility: HOSPITAL | Age: 64
End: 2019-07-27

## 2019-07-27 LAB
BACTERIA SPEC AEROBE CULT: ABNORMAL
BACTERIA SPEC AEROBE CULT: ABNORMAL
BACTERIA SPEC AEROBE CULT: NORMAL
BACTERIA SPEC AEROBE CULT: NORMAL
GRAM STN SPEC: ABNORMAL
GRAM STN SPEC: ABNORMAL

## 2019-07-27 NOTE — OUTREACH NOTE
Prep Survey      Responses   Facility patient discharged from?  [facility]   Is patient eligible?  Yes   Discharge diagnosis  Infection d/t enterococcus, morbid obesity, WANDER, anemia d/t CKD, Dibetic foot infection, CKD, IDDM , essential HTN, S/P Bedside wound debridement   Does the patient have one of the following disease processes/diagnoses(primary or secondary)?  General Surgery   Does the patient have Home health ordered?  No   Is there a DME ordered?  No   Comments regarding appointments  See AVS   Prep survey completed?  Yes          Indigo Gonzalez RN

## 2019-07-29 ENCOUNTER — READMISSION MANAGEMENT (OUTPATIENT)
Dept: CALL CENTER | Facility: HOSPITAL | Age: 64
End: 2019-07-29

## 2019-07-29 NOTE — OUTREACH NOTE
General Surgery Week 1 Survey      Responses   Facility patient discharged from?  [place]   Does the patient have one of the following disease processes/diagnoses(primary or secondary)?  General Surgery   Is there a successful TCM telephone encounter documented?  No   Week 1 attempt successful?  No   Unsuccessful attempts  Attempt 1          Indigo Richardson RN

## 2019-07-30 ENCOUNTER — READMISSION MANAGEMENT (OUTPATIENT)
Dept: CALL CENTER | Facility: HOSPITAL | Age: 64
End: 2019-07-30

## 2019-07-30 NOTE — OUTREACH NOTE
General Surgery Week 1 Survey      Responses   Facility patient discharged from?  [place]   Does the patient have one of the following disease processes/diagnoses(primary or secondary)?  General Surgery   Is there a successful TCM telephone encounter documented?  No   Week 1 attempt successful?  No   Unsuccessful attempts  Attempt 2          Madeline Weir RN

## 2019-07-31 ENCOUNTER — READMISSION MANAGEMENT (OUTPATIENT)
Dept: CALL CENTER | Facility: HOSPITAL | Age: 64
End: 2019-07-31

## 2019-07-31 NOTE — OUTREACH NOTE
General Surgery Week 1 Survey      Responses   Facility patient discharged from?  [facility]   Does the patient have one of the following disease processes/diagnoses(primary or secondary)?  General Surgery   Is there a successful TCM telephone encounter documented?  No   Week 1 attempt successful?  Yes   Call start time  1227   Call end time  1229   Discharge diagnosis  Infection d/t enterococcus, morbid obesity, WANDER, anemia d/t CKD, Dibetic foot infection, CKD, IDDM , essential HTN, S/P Bedside wound debridement   Is patient permission given to speak with other caregiver?  Yes   List who call center can speak with  spouse, Joan   Person spoke with today (if not patient) and relationship  spouse, Joan   Meds reviewed with patient/caregiver?  Yes   Is the patient having any side effects they believe may be caused by any medication additions or changes?  No   Does the patient have all medications related to this admission filled (includes all antibiotics, pain medications, etc.)  Yes   Is the patient taking all medications as directed (includes completed medication regime)?  Yes   Does the patient have a follow up appointment scheduled with their surgeon?  Yes   Has the patient kept scheduled appointments due by today?  Yes   Has home health visited the patient within 72 hours of discharge?  N/A   Psychosocial issues?  No   Did the patient receive a copy of their discharge instructions?  Yes   Nursing interventions  Reviewed instructions with patient   What is the patient's perception of their health status since discharge?  Improving   Week 1 call completed?  Yes   Revoked  No further contact(revokes)-requires comment   Graduated/Revoked comments  Wife states that she works for [employer]. No further follow up calls.           Indigo Richardson RN

## 2019-08-01 ENCOUNTER — OFFICE VISIT (OUTPATIENT)
Dept: WOUND CARE | Facility: HOSPITAL | Age: 64
End: 2019-08-01

## 2019-08-08 ENCOUNTER — OFFICE VISIT (OUTPATIENT)
Dept: WOUND CARE | Facility: HOSPITAL | Age: 64
End: 2019-08-08

## 2019-08-08 ENCOUNTER — LAB REQUISITION (OUTPATIENT)
Dept: LAB | Facility: HOSPITAL | Age: 64
End: 2019-08-08

## 2019-08-08 DIAGNOSIS — Z00.00 ENCOUNTER FOR GENERAL ADULT MEDICAL EXAMINATION WITHOUT ABNORMAL FINDINGS: ICD-10-CM

## 2019-08-08 PROCEDURE — 87185 SC STD ENZYME DETCJ PER NZM: CPT | Performed by: PODIATRIST

## 2019-08-08 PROCEDURE — 87077 CULTURE AEROBIC IDENTIFY: CPT | Performed by: PODIATRIST

## 2019-08-08 PROCEDURE — 87075 CULTR BACTERIA EXCEPT BLOOD: CPT | Performed by: PODIATRIST

## 2019-08-08 PROCEDURE — 87205 SMEAR GRAM STAIN: CPT | Performed by: PODIATRIST

## 2019-08-08 PROCEDURE — 87070 CULTURE OTHR SPECIMN AEROBIC: CPT | Performed by: PODIATRIST

## 2019-08-08 PROCEDURE — 87176 TISSUE HOMOGENIZATION CULTR: CPT | Performed by: PODIATRIST

## 2019-08-08 PROCEDURE — 87186 SC STD MICRODIL/AGAR DIL: CPT | Performed by: PODIATRIST

## 2019-08-10 LAB
BACTERIA SPEC AEROBE CULT: ABNORMAL
BACTERIA SPEC AEROBE CULT: ABNORMAL
GRAM STN SPEC: ABNORMAL

## 2019-08-13 LAB — BACTERIA SPEC ANAEROBE CULT: NORMAL

## 2019-08-15 ENCOUNTER — OFFICE VISIT (OUTPATIENT)
Dept: WOUND CARE | Facility: HOSPITAL | Age: 64
End: 2019-08-15

## 2019-08-22 ENCOUNTER — OFFICE VISIT (OUTPATIENT)
Dept: WOUND CARE | Facility: HOSPITAL | Age: 64
End: 2019-08-22

## 2019-08-29 ENCOUNTER — OFFICE VISIT (OUTPATIENT)
Dept: WOUND CARE | Facility: HOSPITAL | Age: 64
End: 2019-08-29

## 2019-09-05 ENCOUNTER — OFFICE VISIT (OUTPATIENT)
Dept: WOUND CARE | Facility: HOSPITAL | Age: 64
End: 2019-09-05

## 2019-09-10 ENCOUNTER — OFFICE VISIT (OUTPATIENT)
Dept: WOUND CARE | Facility: HOSPITAL | Age: 64
End: 2019-09-10

## 2019-09-19 ENCOUNTER — OFFICE VISIT (OUTPATIENT)
Dept: WOUND CARE | Facility: HOSPITAL | Age: 64
End: 2019-09-19

## 2019-09-19 ENCOUNTER — LAB REQUISITION (OUTPATIENT)
Dept: LAB | Facility: HOSPITAL | Age: 64
End: 2019-09-19

## 2019-09-19 DIAGNOSIS — Z00.00 ENCOUNTER FOR GENERAL ADULT MEDICAL EXAMINATION WITHOUT ABNORMAL FINDINGS: ICD-10-CM

## 2019-09-19 PROCEDURE — 87070 CULTURE OTHR SPECIMN AEROBIC: CPT | Performed by: PODIATRIST

## 2019-09-19 PROCEDURE — 87205 SMEAR GRAM STAIN: CPT | Performed by: PODIATRIST

## 2019-09-19 PROCEDURE — 87075 CULTR BACTERIA EXCEPT BLOOD: CPT | Performed by: PODIATRIST

## 2019-09-19 PROCEDURE — 87186 SC STD MICRODIL/AGAR DIL: CPT | Performed by: PODIATRIST

## 2019-09-19 PROCEDURE — 87147 CULTURE TYPE IMMUNOLOGIC: CPT | Performed by: PODIATRIST

## 2019-09-19 PROCEDURE — 87176 TISSUE HOMOGENIZATION CULTR: CPT | Performed by: PODIATRIST

## 2019-09-20 ENCOUNTER — HOSPITAL ENCOUNTER (OUTPATIENT)
Dept: GENERAL RADIOLOGY | Facility: HOSPITAL | Age: 64
Discharge: HOME | End: 2019-09-20
Admitting: PODIATRIST

## 2019-09-20 ENCOUNTER — TRANSCRIBE ORDERS (OUTPATIENT)
Dept: ADMINISTRATIVE | Facility: HOSPITAL | Age: 64
End: 2019-09-20

## 2019-09-20 DIAGNOSIS — M86.9 DIABETIC FOOT ULCER WITH OSTEOMYELITIS (HCC): Primary | ICD-10-CM

## 2019-09-20 DIAGNOSIS — L97.509 DIABETIC FOOT ULCER WITH OSTEOMYELITIS (HCC): ICD-10-CM

## 2019-09-20 DIAGNOSIS — E11.621 DIABETIC FOOT ULCER WITH OSTEOMYELITIS (HCC): ICD-10-CM

## 2019-09-20 DIAGNOSIS — E11.69 DIABETIC FOOT ULCER WITH OSTEOMYELITIS (HCC): Primary | ICD-10-CM

## 2019-09-20 DIAGNOSIS — E11.621 DIABETIC FOOT ULCER WITH OSTEOMYELITIS (HCC): Primary | ICD-10-CM

## 2019-09-20 DIAGNOSIS — M86.9 DIABETIC FOOT ULCER WITH OSTEOMYELITIS (HCC): ICD-10-CM

## 2019-09-20 DIAGNOSIS — L97.509 DIABETIC FOOT ULCER WITH OSTEOMYELITIS (HCC): Primary | ICD-10-CM

## 2019-09-20 DIAGNOSIS — E11.69 DIABETIC FOOT ULCER WITH OSTEOMYELITIS (HCC): ICD-10-CM

## 2019-09-20 PROCEDURE — 73630 X-RAY EXAM OF FOOT: CPT

## 2019-09-22 LAB
BACTERIA SPEC AEROBE CULT: ABNORMAL
GRAM STN SPEC: ABNORMAL
GRAM STN SPEC: ABNORMAL

## 2019-09-24 ENCOUNTER — OFFICE VISIT (OUTPATIENT)
Dept: WOUND CARE | Facility: HOSPITAL | Age: 64
End: 2019-09-24

## 2019-09-24 LAB — BACTERIA SPEC ANAEROBE CULT: NORMAL

## 2019-09-25 ENCOUNTER — APPOINTMENT (OUTPATIENT)
Dept: WOUND CARE | Facility: HOSPITAL | Age: 64
End: 2019-09-25

## 2019-09-26 ENCOUNTER — TRANSCRIBE ORDERS (OUTPATIENT)
Dept: ADMINISTRATIVE | Facility: HOSPITAL | Age: 64
End: 2019-09-26

## 2019-09-26 DIAGNOSIS — M86.9 OSTEOMYELITIS OF RIGHT FOOT, UNSPECIFIED TYPE (HCC): Primary | ICD-10-CM

## 2019-10-01 ENCOUNTER — HOSPITAL ENCOUNTER (OUTPATIENT)
Dept: MRI IMAGING | Facility: HOSPITAL | Age: 64
Discharge: HOME | End: 2019-10-01
Admitting: PODIATRIST

## 2019-10-01 ENCOUNTER — OFFICE VISIT (OUTPATIENT)
Dept: WOUND CARE | Facility: HOSPITAL | Age: 64
End: 2019-10-01

## 2019-10-01 DIAGNOSIS — M86.9 OSTEOMYELITIS OF RIGHT FOOT, UNSPECIFIED TYPE (HCC): ICD-10-CM

## 2019-10-01 PROCEDURE — 73718 MRI LOWER EXTREMITY W/O DYE: CPT

## 2019-10-04 ENCOUNTER — TRANSCRIBE ORDERS (OUTPATIENT)
Dept: ADMINISTRATIVE | Facility: HOSPITAL | Age: 64
End: 2019-10-04

## 2019-10-04 DIAGNOSIS — M85.80 SAPHO SYNDROME (HCC): Primary | ICD-10-CM

## 2019-10-04 DIAGNOSIS — M86.9 SAPHO SYNDROME (HCC): Primary | ICD-10-CM

## 2019-10-04 DIAGNOSIS — L70.9 SAPHO SYNDROME (HCC): Primary | ICD-10-CM

## 2019-10-04 DIAGNOSIS — L40.3 SAPHO SYNDROME (HCC): Primary | ICD-10-CM

## 2019-10-04 DIAGNOSIS — L97.514 ULCER OF TOE OF RIGHT FOOT, WITH NECROSIS OF BONE (HCC): ICD-10-CM

## 2019-10-04 DIAGNOSIS — M65.9 SAPHO SYNDROME (HCC): Primary | ICD-10-CM

## 2019-10-07 ENCOUNTER — TRANSCRIBE ORDERS (OUTPATIENT)
Dept: ADMINISTRATIVE | Facility: HOSPITAL | Age: 64
End: 2019-10-07

## 2019-10-07 DIAGNOSIS — M86.9 OSTEOMYELITIS OF METATARSAL (HCC): Primary | ICD-10-CM

## 2019-10-08 ENCOUNTER — INFUSION (OUTPATIENT)
Dept: ONCOLOGY | Facility: HOSPITAL | Age: 64
End: 2019-10-08

## 2019-10-08 ENCOUNTER — HOSPITAL ENCOUNTER (OUTPATIENT)
Dept: OTHER | Facility: HOSPITAL | Age: 64
Discharge: HOME | End: 2019-10-08
Admitting: PODIATRIST

## 2019-10-08 ENCOUNTER — LAB (OUTPATIENT)
Dept: LAB | Facility: HOSPITAL | Age: 64
End: 2019-10-08

## 2019-10-08 ENCOUNTER — HOSPITAL ENCOUNTER (OUTPATIENT)
Dept: GENERAL RADIOLOGY | Facility: HOSPITAL | Age: 64
Discharge: HOME | End: 2019-10-08

## 2019-10-08 ENCOUNTER — TRANSCRIBE ORDERS (OUTPATIENT)
Dept: ADMINISTRATIVE | Facility: HOSPITAL | Age: 64
End: 2019-10-08

## 2019-10-08 ENCOUNTER — OFFICE VISIT (OUTPATIENT)
Dept: WOUND CARE | Facility: HOSPITAL | Age: 64
End: 2019-10-08

## 2019-10-08 VITALS
HEART RATE: 83 BPM | SYSTOLIC BLOOD PRESSURE: 147 MMHG | BODY MASS INDEX: 42.66 KG/M2 | OXYGEN SATURATION: 100 % | TEMPERATURE: 99.1 F | DIASTOLIC BLOOD PRESSURE: 79 MMHG | HEIGHT: 72 IN | WEIGHT: 315 LBS

## 2019-10-08 DIAGNOSIS — M85.80 SAPHO SYNDROME (HCC): ICD-10-CM

## 2019-10-08 DIAGNOSIS — M86.9 INFLAMMATION OF BONE (HCC): Primary | ICD-10-CM

## 2019-10-08 DIAGNOSIS — M86.9 OSTEOMYELITIS OF FIFTH TOE OF RIGHT FOOT (HCC): Primary | ICD-10-CM

## 2019-10-08 DIAGNOSIS — M86.9 SAPHO SYNDROME (HCC): ICD-10-CM

## 2019-10-08 DIAGNOSIS — L97.514 ULCER OF TOE OF RIGHT FOOT, WITH NECROSIS OF BONE (HCC): ICD-10-CM

## 2019-10-08 DIAGNOSIS — L40.3 SAPHO SYNDROME (HCC): ICD-10-CM

## 2019-10-08 DIAGNOSIS — M65.9 SAPHO SYNDROME (HCC): ICD-10-CM

## 2019-10-08 DIAGNOSIS — L70.9 SAPHO SYNDROME (HCC): ICD-10-CM

## 2019-10-08 LAB
ALBUMIN SERPL-MCNC: 4.2 G/DL (ref 3.5–5.2)
ALBUMIN/GLOB SERPL: 1.3 G/DL
ALP SERPL-CCNC: 75 U/L (ref 39–117)
ALT SERPL W P-5'-P-CCNC: 14 U/L (ref 1–41)
ANION GAP SERPL CALCULATED.3IONS-SCNC: 12 MMOL/L (ref 5–15)
AST SERPL-CCNC: 22 U/L (ref 1–40)
BASOPHILS # BLD AUTO: 0.05 10*3/MM3 (ref 0–0.2)
BASOPHILS NFR BLD AUTO: 0.6 % (ref 0–1.5)
BILIRUB SERPL-MCNC: 0.3 MG/DL (ref 0.2–1.2)
BUN BLD-MCNC: 21 MG/DL (ref 8–23)
BUN/CREAT SERPL: 15.3 (ref 7–25)
CALCIUM SPEC-SCNC: 8.8 MG/DL (ref 8.6–10.5)
CHLORIDE SERPL-SCNC: 103 MMOL/L (ref 98–107)
CO2 SERPL-SCNC: 27 MMOL/L (ref 22–29)
CREAT BLD-MCNC: 1.37 MG/DL (ref 0.76–1.27)
CRP SERPL-MCNC: 1.35 MG/DL (ref 0–0.5)
DEPRECATED RDW RBC AUTO: 43.5 FL (ref 37–54)
EOSINOPHIL # BLD AUTO: 0.41 10*3/MM3 (ref 0–0.4)
EOSINOPHIL NFR BLD AUTO: 5.2 % (ref 0.3–6.2)
ERYTHROCYTE [DISTWIDTH] IN BLOOD BY AUTOMATED COUNT: 14 % (ref 12.3–15.4)
ERYTHROCYTE [SEDIMENTATION RATE] IN BLOOD: 26 MM/HR (ref 0–15)
GFR SERPL CREATININE-BSD FRML MDRD: 52 ML/MIN/1.73
GLOBULIN UR ELPH-MCNC: 3.2 GM/DL
GLUCOSE BLD-MCNC: 164 MG/DL (ref 65–99)
HBA1C MFR BLD: 9.7 % (ref 4.8–5.6)
HCT VFR BLD AUTO: 35.4 % (ref 37.5–51)
HGB BLD-MCNC: 11.9 G/DL (ref 13–17.7)
IMM GRANULOCYTES # BLD AUTO: 0.04 10*3/MM3 (ref 0–0.05)
IMM GRANULOCYTES NFR BLD AUTO: 0.5 % (ref 0–0.5)
LYMPHOCYTES # BLD AUTO: 1.46 10*3/MM3 (ref 0.7–3.1)
LYMPHOCYTES NFR BLD AUTO: 18.3 % (ref 19.6–45.3)
MCH RBC QN AUTO: 28.6 PG (ref 26.6–33)
MCHC RBC AUTO-ENTMCNC: 33.6 G/DL (ref 31.5–35.7)
MCV RBC AUTO: 85.1 FL (ref 79–97)
MONOCYTES # BLD AUTO: 0.72 10*3/MM3 (ref 0.1–0.9)
MONOCYTES NFR BLD AUTO: 9 % (ref 5–12)
NEUTROPHILS # BLD AUTO: 5.28 10*3/MM3 (ref 1.7–7)
NEUTROPHILS NFR BLD AUTO: 66.4 % (ref 42.7–76)
NRBC BLD AUTO-RTO: 0 /100 WBC (ref 0–0.2)
PLATELET # BLD AUTO: 255 10*3/MM3 (ref 140–450)
PMV BLD AUTO: 11.2 FL (ref 6–12)
POTASSIUM BLD-SCNC: 4.1 MMOL/L (ref 3.5–5.2)
PREALB SERPL-MCNC: 19.2 MG/DL (ref 20–40)
PROT SERPL-MCNC: 7.4 G/DL (ref 6–8.5)
RBC # BLD AUTO: 4.16 10*6/MM3 (ref 4.14–5.8)
SODIUM BLD-SCNC: 142 MMOL/L (ref 136–145)
WBC NRBC COR # BLD: 7.96 10*3/MM3 (ref 3.4–10.8)

## 2019-10-08 PROCEDURE — 86140 C-REACTIVE PROTEIN: CPT

## 2019-10-08 PROCEDURE — 83036 HEMOGLOBIN GLYCOSYLATED A1C: CPT

## 2019-10-08 PROCEDURE — 71045 X-RAY EXAM CHEST 1 VIEW: CPT

## 2019-10-08 PROCEDURE — 85025 COMPLETE CBC W/AUTO DIFF WBC: CPT

## 2019-10-08 PROCEDURE — 96365 THER/PROPH/DIAG IV INF INIT: CPT

## 2019-10-08 PROCEDURE — 80053 COMPREHEN METABOLIC PANEL: CPT

## 2019-10-08 PROCEDURE — 84134 ASSAY OF PREALBUMIN: CPT

## 2019-10-08 PROCEDURE — 36415 COLL VENOUS BLD VENIPUNCTURE: CPT

## 2019-10-08 PROCEDURE — 85651 RBC SED RATE NONAUTOMATED: CPT

## 2019-10-08 PROCEDURE — C1751 CATH, INF, PER/CENT/MIDLINE: HCPCS

## 2019-10-08 PROCEDURE — 25010000002 VANCOMYCIN 1 G RECONSTITUTED SOLUTION: Performed by: PODIATRIST

## 2019-10-08 RX ORDER — LIDOCAINE HYDROCHLORIDE 10 MG/ML
1 INJECTION, SOLUTION EPIDURAL; INFILTRATION; INTRACAUDAL; PERINEURAL ONCE
Status: COMPLETED | OUTPATIENT
Start: 2019-10-08 | End: 2019-10-08

## 2019-10-08 RX ADMIN — VANCOMYCIN HYDROCHLORIDE 1000 MG: 1 INJECTION, POWDER, LYOPHILIZED, FOR SOLUTION INTRAVENOUS at 13:20

## 2019-10-08 RX ADMIN — LIDOCAINE HYDROCHLORIDE 1 ML: 10 INJECTION, SOLUTION EPIDURAL; INFILTRATION; INTRACAUDAL; PERINEURAL at 11:47

## 2019-10-08 NOTE — NURSING NOTE
Pt had 4 Irish single lumen PICC placed in left cephalic vein. Pt tolerated procedure well. 50 cm of catheter internal and 0 cm external. Tip confirmation by xray. All lumens flush and draw well. Sterile dressing applied.

## 2019-10-09 ENCOUNTER — OFFICE VISIT (OUTPATIENT)
Dept: WOUND CARE | Facility: HOSPITAL | Age: 64
End: 2019-10-09

## 2019-10-09 PROCEDURE — G0277 HBOT, FULL BODY CHAMBER, 30M: HCPCS

## 2019-10-10 ENCOUNTER — OFFICE VISIT (OUTPATIENT)
Dept: WOUND CARE | Facility: HOSPITAL | Age: 64
End: 2019-10-10

## 2019-10-10 PROCEDURE — G0277 HBOT, FULL BODY CHAMBER, 30M: HCPCS

## 2019-10-11 ENCOUNTER — OFFICE VISIT (OUTPATIENT)
Dept: WOUND CARE | Facility: HOSPITAL | Age: 64
End: 2019-10-11

## 2019-10-11 PROCEDURE — G0463 HOSPITAL OUTPT CLINIC VISIT: HCPCS

## 2019-10-12 ENCOUNTER — APPOINTMENT (OUTPATIENT)
Dept: CT IMAGING | Facility: HOSPITAL | Age: 64
End: 2019-10-12

## 2019-10-12 ENCOUNTER — APPOINTMENT (OUTPATIENT)
Dept: GENERAL RADIOLOGY | Facility: HOSPITAL | Age: 64
End: 2019-10-12

## 2019-10-12 ENCOUNTER — HOSPITAL ENCOUNTER (EMERGENCY)
Facility: HOSPITAL | Age: 64
Discharge: HOME | End: 2019-10-12
Attending: EMERGENCY MEDICINE | Admitting: EMERGENCY MEDICINE

## 2019-10-12 VITALS
WEIGHT: 315 LBS | HEIGHT: 72 IN | OXYGEN SATURATION: 98 % | RESPIRATION RATE: 18 BRPM | SYSTOLIC BLOOD PRESSURE: 155 MMHG | DIASTOLIC BLOOD PRESSURE: 64 MMHG | HEART RATE: 82 BPM | BODY MASS INDEX: 42.66 KG/M2 | TEMPERATURE: 99 F

## 2019-10-12 DIAGNOSIS — J81.1 CHRONIC PULMONARY EDEMA: Primary | ICD-10-CM

## 2019-10-12 LAB
ALBUMIN SERPL-MCNC: 4.3 G/DL (ref 3.5–5.2)
ALBUMIN/GLOB SERPL: 1.4 G/DL
ALP SERPL-CCNC: 71 U/L (ref 39–117)
ALT SERPL W P-5'-P-CCNC: 16 U/L (ref 1–41)
ANION GAP SERPL CALCULATED.3IONS-SCNC: 13 MMOL/L (ref 5–15)
ARTERIAL PATENCY WRIST A: POSITIVE
AST SERPL-CCNC: 17 U/L (ref 1–40)
ATMOSPHERIC PRESS: 756 MMHG
BACTERIA UR QL AUTO: ABNORMAL /HPF
BASE EXCESS BLDA CALC-SCNC: -0.2 MMOL/L (ref 0–2)
BASOPHILS # BLD AUTO: 0.05 10*3/MM3 (ref 0–0.2)
BASOPHILS NFR BLD AUTO: 0.4 % (ref 0–1.5)
BDY SITE: ABNORMAL
BILIRUB SERPL-MCNC: 0.4 MG/DL (ref 0.2–1.2)
BILIRUB UR QL STRIP: NEGATIVE
BODY TEMPERATURE: 37 C
BUN BLD-MCNC: 24 MG/DL (ref 8–23)
BUN/CREAT SERPL: 17.9 (ref 7–25)
CALCIUM SPEC-SCNC: 9.3 MG/DL (ref 8.6–10.5)
CHLORIDE SERPL-SCNC: 104 MMOL/L (ref 98–107)
CLARITY UR: CLEAR
CO2 SERPL-SCNC: 26 MMOL/L (ref 22–29)
COLOR UR: YELLOW
CREAT BLD-MCNC: 1.34 MG/DL (ref 0.76–1.27)
DEPRECATED RDW RBC AUTO: 43.1 FL (ref 37–54)
EOSINOPHIL # BLD AUTO: 0.6 10*3/MM3 (ref 0–0.4)
EOSINOPHIL NFR BLD AUTO: 5.1 % (ref 0.3–6.2)
ERYTHROCYTE [DISTWIDTH] IN BLOOD BY AUTOMATED COUNT: 13.8 % (ref 12.3–15.4)
GFR SERPL CREATININE-BSD FRML MDRD: 54 ML/MIN/1.73
GLOBULIN UR ELPH-MCNC: 3.1 GM/DL
GLUCOSE BLD-MCNC: 123 MG/DL (ref 65–99)
GLUCOSE UR STRIP-MCNC: ABNORMAL MG/DL
HCO3 BLDA-SCNC: 24.3 MMOL/L (ref 20–26)
HCT VFR BLD AUTO: 35.1 % (ref 37.5–51)
HGB BLD-MCNC: 11.8 G/DL (ref 13–17.7)
HGB UR QL STRIP.AUTO: NEGATIVE
HOLD SPECIMEN: NORMAL
HYALINE CASTS UR QL AUTO: ABNORMAL /LPF
IMM GRANULOCYTES # BLD AUTO: 0.05 10*3/MM3 (ref 0–0.05)
IMM GRANULOCYTES NFR BLD AUTO: 0.4 % (ref 0–0.5)
KETONES UR QL STRIP: NEGATIVE
LEUKOCYTE ESTERASE UR QL STRIP.AUTO: NEGATIVE
LIPASE SERPL-CCNC: 14 U/L (ref 13–60)
LYMPHOCYTES # BLD AUTO: 1.34 10*3/MM3 (ref 0.7–3.1)
LYMPHOCYTES NFR BLD AUTO: 11.4 % (ref 19.6–45.3)
Lab: ABNORMAL
MCH RBC QN AUTO: 28.9 PG (ref 26.6–33)
MCHC RBC AUTO-ENTMCNC: 33.6 G/DL (ref 31.5–35.7)
MCV RBC AUTO: 85.8 FL (ref 79–97)
MODALITY: ABNORMAL
MONOCYTES # BLD AUTO: 1.05 10*3/MM3 (ref 0.1–0.9)
MONOCYTES NFR BLD AUTO: 8.9 % (ref 5–12)
NEUTROPHILS # BLD AUTO: 8.68 10*3/MM3 (ref 1.7–7)
NEUTROPHILS NFR BLD AUTO: 73.8 % (ref 42.7–76)
NITRITE UR QL STRIP: NEGATIVE
NRBC BLD AUTO-RTO: 0 /100 WBC (ref 0–0.2)
NT-PROBNP SERPL-MCNC: 1203 PG/ML (ref 5–900)
PCO2 BLDA: 38.3 MM HG (ref 35–45)
PH BLDA: 7.41 PH UNITS (ref 7.35–7.45)
PH UR STRIP.AUTO: <=5 [PH] (ref 5–8)
PLATELET # BLD AUTO: 252 10*3/MM3 (ref 140–450)
PMV BLD AUTO: 10.5 FL (ref 6–12)
PO2 BLDA: 65.7 MM HG (ref 83–108)
POTASSIUM BLD-SCNC: 4.1 MMOL/L (ref 3.5–5.2)
PROT SERPL-MCNC: 7.4 G/DL (ref 6–8.5)
PROT UR QL STRIP: ABNORMAL
RBC # BLD AUTO: 4.09 10*6/MM3 (ref 4.14–5.8)
RBC # UR: ABNORMAL /HPF
REF LAB TEST METHOD: ABNORMAL
SAO2 % BLDCOA: 94.1 % (ref 94–99)
SODIUM BLD-SCNC: 143 MMOL/L (ref 136–145)
SP GR UR STRIP: 1.01 (ref 1–1.03)
SQUAMOUS #/AREA URNS HPF: ABNORMAL /HPF
TROPONIN T SERPL-MCNC: <0.01 NG/ML (ref 0–0.03)
UROBILINOGEN UR QL STRIP: ABNORMAL
VENTILATOR MODE: ABNORMAL
WBC NRBC COR # BLD: 11.77 10*3/MM3 (ref 3.4–10.8)
WBC UR QL AUTO: ABNORMAL /HPF
WHOLE BLOOD HOLD SPECIMEN: NORMAL
WHOLE BLOOD HOLD SPECIMEN: NORMAL

## 2019-10-12 PROCEDURE — 25010000002 IOPAMIDOL 61 % SOLUTION: Performed by: EMERGENCY MEDICINE

## 2019-10-12 PROCEDURE — 36600 WITHDRAWAL OF ARTERIAL BLOOD: CPT

## 2019-10-12 PROCEDURE — 96374 THER/PROPH/DIAG INJ IV PUSH: CPT

## 2019-10-12 PROCEDURE — 25010000002 ONDANSETRON PER 1 MG: Performed by: EMERGENCY MEDICINE

## 2019-10-12 PROCEDURE — 99284 EMERGENCY DEPT VISIT MOD MDM: CPT

## 2019-10-12 PROCEDURE — 85025 COMPLETE CBC W/AUTO DIFF WBC: CPT | Performed by: EMERGENCY MEDICINE

## 2019-10-12 PROCEDURE — 93005 ELECTROCARDIOGRAM TRACING: CPT | Performed by: EMERGENCY MEDICINE

## 2019-10-12 PROCEDURE — 82803 BLOOD GASES ANY COMBINATION: CPT

## 2019-10-12 PROCEDURE — 71045 X-RAY EXAM CHEST 1 VIEW: CPT

## 2019-10-12 PROCEDURE — 96375 TX/PRO/DX INJ NEW DRUG ADDON: CPT

## 2019-10-12 PROCEDURE — 25010000002 MORPHINE PER 10 MG: Performed by: EMERGENCY MEDICINE

## 2019-10-12 PROCEDURE — 80053 COMPREHEN METABOLIC PANEL: CPT | Performed by: EMERGENCY MEDICINE

## 2019-10-12 PROCEDURE — 25010000002 DIPHENHYDRAMINE PER 50 MG: Performed by: EMERGENCY MEDICINE

## 2019-10-12 PROCEDURE — 83880 ASSAY OF NATRIURETIC PEPTIDE: CPT | Performed by: EMERGENCY MEDICINE

## 2019-10-12 PROCEDURE — 81001 URINALYSIS AUTO W/SCOPE: CPT | Performed by: EMERGENCY MEDICINE

## 2019-10-12 PROCEDURE — 84484 ASSAY OF TROPONIN QUANT: CPT | Performed by: EMERGENCY MEDICINE

## 2019-10-12 PROCEDURE — 83690 ASSAY OF LIPASE: CPT | Performed by: EMERGENCY MEDICINE

## 2019-10-12 PROCEDURE — 93010 ELECTROCARDIOGRAM REPORT: CPT | Performed by: INTERNAL MEDICINE

## 2019-10-12 PROCEDURE — 74177 CT ABD & PELVIS W/CONTRAST: CPT

## 2019-10-12 RX ORDER — ONDANSETRON 2 MG/ML
4 INJECTION INTRAMUSCULAR; INTRAVENOUS ONCE
Status: COMPLETED | OUTPATIENT
Start: 2019-10-12 | End: 2019-10-12

## 2019-10-12 RX ORDER — MORPHINE SULFATE 10 MG/ML
8 INJECTION INTRAMUSCULAR; INTRAVENOUS; SUBCUTANEOUS ONCE
Status: COMPLETED | OUTPATIENT
Start: 2019-10-12 | End: 2019-10-12

## 2019-10-12 RX ORDER — BUMETANIDE 0.25 MG/ML
2 INJECTION INTRAMUSCULAR; INTRAVENOUS ONCE
Status: COMPLETED | OUTPATIENT
Start: 2019-10-12 | End: 2019-10-12

## 2019-10-12 RX ORDER — SODIUM CHLORIDE 0.9 % (FLUSH) 0.9 %
10 SYRINGE (ML) INJECTION AS NEEDED
Status: DISCONTINUED | OUTPATIENT
Start: 2019-10-12 | End: 2019-10-12 | Stop reason: HOSPADM

## 2019-10-12 RX ORDER — DIPHENHYDRAMINE HYDROCHLORIDE 50 MG/ML
25 INJECTION INTRAMUSCULAR; INTRAVENOUS ONCE
Status: COMPLETED | OUTPATIENT
Start: 2019-10-12 | End: 2019-10-12

## 2019-10-12 RX ORDER — FAMOTIDINE 10 MG/ML
20 INJECTION, SOLUTION INTRAVENOUS ONCE
Status: COMPLETED | OUTPATIENT
Start: 2019-10-12 | End: 2019-10-12

## 2019-10-12 RX ADMIN — IOPAMIDOL 150 ML: 612 INJECTION, SOLUTION INTRAVENOUS at 04:50

## 2019-10-12 RX ADMIN — ONDANSETRON HYDROCHLORIDE 4 MG: 2 SOLUTION INTRAMUSCULAR; INTRAVENOUS at 03:46

## 2019-10-12 RX ADMIN — BUMETANIDE 2 MG: 0.25 INJECTION INTRAMUSCULAR; INTRAVENOUS at 06:15

## 2019-10-12 RX ADMIN — DIPHENHYDRAMINE HYDROCHLORIDE 25 MG: 50 INJECTION, SOLUTION INTRAMUSCULAR; INTRAVENOUS at 03:46

## 2019-10-12 RX ADMIN — MORPHINE SULFATE 8 MG: 10 INJECTION, SOLUTION INTRAMUSCULAR; INTRAVENOUS at 04:21

## 2019-10-12 RX ADMIN — FAMOTIDINE 20 MG: 10 INJECTION, SOLUTION INTRAVENOUS at 03:46

## 2019-10-12 NOTE — ED PROVIDER NOTES
Subjective   65 y/o male arrives for possible allergic reaction to vancomycin. The patient was recently diagnosed by Dr. 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 The patient reports he has been having a diffuse rash that is not currently present. He further notes his foot is still swollen and that he is having discomfort to his abdomen. He denies any nausea, vomiting or diarrhea. He has had vanco in the hospital without any issues at all. He denies throat closing or difficulty swallowing (he actually drank benadryl prior to arrival without any issues). He notes abdominal discomfort that is diffuse in nature. He denies any fevers or chills, falls or trauma. He denies CP, flank or back pain, dysuria, hematuria, numbness, tingling, loss of sensation or other issues. He arrives in [facility].         Family, social and past history reviewed as below, prior documentation of H and Ps and other documentation are reviewed:    Past Medical History:  No date: Arthritis  No date: CHF (congestive heart failure) (CMS/[facility])  No date: Coronary artery disease  No date: Diabetes mellitus (CMS/[facility])  No date: Hyperlipidemia  No date: Hypertension  No date: Myocardial infarction (CMS/[facility])  No date: Pancreatitis  No date: Renal disorder  No date: Sleep apnea with use of continuous positive airway pressure   (CPAP)    Past Surgical History:  No date: ABDOMINAL SURGERY  No date: APPENDECTOMY  No date: BACK SURGERY  No date: CARDIAC SURGERY  No date: CATARACT EXTRACTION  No date: CERVICAL SPINE SURGERY  1/31/2017: COLONOSCOPY; N/A      Comment:  Normal exam repeat in 5 years  2/11/2019: COLONOSCOPY; N/A      Comment:  Procedure: COLONOSCOPY WITH ANESTHESIA;  Surgeon:                Tyrell Smith MD;  Location: [facility] ENDOSCOPY;                 Service: Gastroenterology  03/04/2014: COLONOSCOPY W/ POLYPECTOMY      Comment:  Hyperplastic polyp  10/2015: CORONARY ARTERY BYPASS GRAFT  04/13/2011:  "ENDOSCOPY      Comment:  Gastritis with hemorrhage  5/5/2017: ENDOSCOPY; N/A      Comment:  Normal exam  2/11/2019: ENDOSCOPY; N/A      Comment:  Procedure: ESOPHAGOGASTRODUODENOSCOPY WITH ANESTHESIA;                 Surgeon: Tyrell Smith MD;  Location: [facility]                ENDOSCOPY;  Service: Gastroenterology  09/2007: INCISION AND DRAINAGE OF WOUND; Left      Comment:  spider bite    Social History    Socioeconomic History      Marital status:       Spouse name: Not on file      Number of children: Not on file      Years of education: Not on file      Highest education level: Not on file    Tobacco Use      Smoking status: Never Smoker      Smokeless tobacco: Never Used      Tobacco comment: smoked in highschool    Substance and Sexual Activity      Alcohol use: No      Drug use: No      Sexual activity: Defer      Family history: reviewed and noncontributory               Review of Systems   All other systems reviewed and are negative.      Past Medical History:   Diagnosis Date   • Arthritis    • CHF (congestive heart failure) (CMS/HCC)    • Coronary artery disease    • Diabetes mellitus (CMS/HCC)    • Hyperlipidemia    • Hypertension    • Myocardial infarction (CMS/HCC)    • Pancreatitis    • Renal disorder    • Sleep apnea with use of continuous positive airway pressure (CPAP)        Allergies   Allergen Reactions   • Bactrim [Sulfamethoxazole-Trimethoprim] Other (See Comments)     \"RENAL FAILURE\"       Past Surgical History:   Procedure Laterality Date   • ABDOMINAL SURGERY     • APPENDECTOMY     • BACK SURGERY     • CARDIAC SURGERY     • CATARACT EXTRACTION     • CERVICAL SPINE SURGERY     • COLONOSCOPY N/A 1/31/2017    Normal exam repeat in 5 years   • COLONOSCOPY N/A 2/11/2019    Procedure: COLONOSCOPY WITH ANESTHESIA;  Surgeon: Tyrell Smith MD;  Location: [facility] ENDOSCOPY;  Service: Gastroenterology   • COLONOSCOPY W/ POLYPECTOMY  03/04/2014    Hyperplastic polyp   • CORONARY ARTERY " BYPASS GRAFT  10/2015   • ENDOSCOPY  04/13/2011    Gastritis with hemorrhage   • ENDOSCOPY N/A 5/5/2017    Normal exam   • ENDOSCOPY N/A 2/11/2019    Procedure: ESOPHAGOGASTRODUODENOSCOPY WITH ANESTHESIA;  Surgeon: Tyrell Smith MD;  Location: [facility] ENDOSCOPY;  Service: Gastroenterology   • INCISION AND DRAINAGE OF WOUND Left 09/2007    spider bite       Family History   Problem Relation Age of Onset   • Colon cancer Father    • Heart disease Father    • Colon cancer Sister    • Colon polyps Sister    • Alzheimer's disease Mother    • Coronary artery disease Sister    • Coronary artery disease Sister        Social History     Socioeconomic History   • Marital status:      Spouse name: Not on file   • Number of children: Not on file   • Years of education: Not on file   • Highest education level: Not on file   Tobacco Use   • Smoking status: Never Smoker   • Smokeless tobacco: Never Used   • Tobacco comment: smoked in highschool   Substance and Sexual Activity   • Alcohol use: No   • Drug use: No   • Sexual activity: Defer           Objective   Physical Exam   Constitutional: He is oriented to person, place, and time. He appears well-developed and well-nourished.   HENT:   Head: Normocephalic and atraumatic.   Eyes: Conjunctivae and EOM are normal. Pupils are equal, round, and reactive to light.   Neck: Normal range of motion. Neck supple.   No stridor, no trismus, no tongue swelling, no drooling, speech is clear. No tripoding.    Cardiovascular: Normal rate, regular rhythm, normal heart sounds and intact distal pulses. Exam reveals no gallop and no friction rub.   No murmur heard.  Pulmonary/Chest: Effort normal and breath sounds normal. No stridor. No respiratory distress. He has no wheezes. He has no rales. He exhibits no tenderness.   Abdominal: Soft. Bowel sounds are normal. He exhibits no distension and no mass. There is no tenderness. There is no rebound and no guarding. No hernia.    Musculoskeletal: Normal range of motion.   At the right foot there is diffuse swelling to the foot and ankle, pedal and dp are 2/4 bilaterally, sensation is intact.        Neurological: He is alert and oriented to person, place, and time. He displays normal reflexes. No cranial nerve deficit. Coordination normal.   Skin: Skin is warm. Capillary refill takes less than 2 seconds. No rash noted.   Psychiatric: He has a normal mood and affect. His behavior is normal.   Vitals reviewed.      ECG 12 Lead    Date/Time: 10/12/2019 4:00 AM  Performed by: Pop Snyder MD  Authorized by: Pop Snyder MD   Interpreted by physician  Comparison: compared with previous ECG from 2/7/2019  Similar to previous ECG  Rhythm: sinus rhythm  Rate: normal  QRS axis: normal                   ED Course  ED Course as of Oct 12 0802   Sat Oct 12, 2019   0350 While IgE mediated allergic reactions have been reported to vanco they are rare. I honestly do not see any signs of allergic reaction on examination of the patient. He has no rash on throughoug evaluation of his body. He has no wheezing, no stridor, no trismus. He is laying down when I arrive into the room in [facility]. His speech is clear. He does complain of multiple other issues including his foot being swollen which would be common after an osteo infection. He also notes he has injection to his right eye which he states he actually received an IV injection (not sure what for per the family) to this eye and was told it would have a hemorrage here. He does note some abdominal discomfort but really cannot pin point this for me. I will air on the side of caution and provide IV benadryl and pepcid. He is a diabetic so I will avoid steroids. He does not appear to need epi at this time.   [JH]   0420 Patient states he is feeling improved. He only notes abdominal pain at this time. Given leukcoytosis will do Ct.   [JH]   0603 CT is showing only mild pulmonary edema which would go  along with the elevated bnp. His pulse ox is 97-98%. This does not sound like an allergic reaction to vancomycin but I will attempt to get ahold of Dr. Cerrato as he is managing his abx.   []   0653 Interpretation Summary     · The study is technically difficult for diagnosis.  · Left ventricular function is normal. Estimated EF appears to be in the range of 56 - 60%.  · Left ventricular diastolic dysfunction.  · No significant valvular abnormalities are identified.      []   0702 Dr. Cerrato does not feel this is an allergic reaction and I do agree. He will continue the vanco and follow up with them on Monday. We did do the walk of life and his pulse ox was 97%. Will increase his bumex and encourage follow up. He does admit to eating a high satly foods recently. I have cautioned hon better diet control.   []      ED Course User Index  [] Pop Snyder MD      XR Chest 1 View   ED Interpretation   No acute cardiopulmonary process.       Final Result   1. Hypoventilation with vascular crowding.   2. Stable bronchial wall thickening. No dense infiltrate or effusion.           This report was finalized on 10/12/2019 07:43 by Dr. Raz Mendes MD.      CT Abdomen Pelvis With Contrast   Final Result   1. Small bilateral pleural effusions with adjacent atelectasis. A small   nodule in the right lower lobe is clearly calcified on the previous   study.   2. Atheromatous disease of the aortoiliac vessels and coronary arteries.   3. Prior cholecystectomy.   4. Renal cysts on the right.   5. No acute appearing bowel abnormality.   6. Other nonacute findings, as discussed above.   This report was finalized on 10/12/2019 07:42 by Dr. Raz Mendes MD.        Labs Reviewed   COMPREHENSIVE METABOLIC PANEL - Abnormal; Notable for the following components:       Result Value    Glucose 123 (*)     BUN 24 (*)     Creatinine 1.34 (*)     eGFR Non  Amer 54 (*)     All other components within normal limits     Narrative:     GFR Normal >60  Chronic Kidney Disease <60  Kidney Failure <15   URINALYSIS W/ MICROSCOPIC IF INDICATED (NO CULTURE) - Abnormal; Notable for the following components:    Glucose,  mg/dL (Trace) (*)     Protein, UA 30 mg/dL (1+) (*)     All other components within normal limits   BNP (IN-HOUSE) - Abnormal; Notable for the following components:    proBNP 1,203.0 (*)     All other components within normal limits    Narrative:     Among patients with dyspnea, NT-proBNP is highly sensitive for the detection of acute congestive heart failure. In addition NT-proBNP of <300 pg/ml effectively rules out acute congestive heart failure with 99% negative predictive value.   CBC WITH AUTO DIFFERENTIAL - Abnormal; Notable for the following components:    WBC 11.77 (*)     RBC 4.09 (*)     Hemoglobin 11.8 (*)     Hematocrit 35.1 (*)     Lymphocyte % 11.4 (*)     Neutrophils, Absolute 8.68 (*)     Monocytes, Absolute 1.05 (*)     Eosinophils, Absolute 0.60 (*)     All other components within normal limits   URINALYSIS, MICROSCOPIC ONLY - Abnormal; Notable for the following components:    RBC, UA 0-2 (*)     All other components within normal limits   BLOOD GAS, ARTERIAL - Abnormal; Notable for the following components:    pO2, Arterial 65.7 (*)     Base Excess, Arterial -0.2 (*)     All other components within normal limits   LIPASE - Normal   TROPONIN (IN-HOUSE) - Normal    Narrative:     Troponin T Reference Range:  <= 0.03 ng/mL-   Negative for AMI  >0.03 ng/mL-     Abnormal for myocardial necrosis.  Clinicians would have to utilize clinical acumen, EKG, Troponin and serial changes to determine if it is an Acute Myocardial Infarction or myocardial injury due to an underlying chronic condition.    BLOOD GAS, ARTERIAL   RAINBOW DRAW    Narrative:     The following orders were created for panel order [place] Draw.  Procedure                               Abnormality         Status                     ---------                                -----------         ------                     Light Blue Top[[id number]]                                   Final result               Green Top (Gel)[[id number]]                                  Final result               Lavender Top[[id number]]                                     Final result               Red Top[[id number]]                                          In process                   Please view results for these tests on the individual orders.   LIGHT BLUE TOP   GREEN TOP   LAVENDER TOP   CBC AND DIFFERENTIAL    Narrative:     The following orders were created for panel order CBC & Differential.  Procedure                               Abnormality         Status                     ---------                               -----------         ------                     CBC Auto Differential[[id number]]        Abnormal            Final result                 Please view results for these tests on the individual orders.   RED TOP                 [facility]    Final diagnoses:   Chronic pulmonary edema              Pop Snyder MD  10/12/19 0701       Pop Snyder MD  10/12/19 0802

## 2019-10-12 NOTE — DISCHARGE INSTRUCTIONS
Erick,     You have very mild amount of fluid in your lungs known as pulmonary edema. In 2017 you had an echocardiogram that showed you have something known as diastolic dysfunction (this is resting heart failure). You are already on a really good medication (bumex) for this. I would like you to increase this for the next 3 days ( you take 2 mg twice daily, I want you to go up to 6 mg daily for the next three days (you can take 4 mg at one time then 2 mg the next)). I did speak to Dr. Cerrato who does not feel you are having an allergic reaction to the vancomycin so he wishes you to continue this. Please return for any worsening symptoms or any other issues.       Interpretation Summary     · The study is technically difficult for diagnosis.  · Left ventricular function is normal. Estimated EF appears to be in the range of 56 - 60%.  · Left ventricular diastolic dysfunction.  · No significant valvular abnormalities are identified.       Pulmonary edema is a condition in which fluid collects in the air sacs of the lung. This makes it hard for the lungs to fill with air. It also prevents the lungs from moving oxygen into the bloodstream, which can affect other organs, such as the brain and kidneys. Pulmonary edema is an emergency and should be treated immediately.  There are two main types of pulmonary edema:  · Cardiogenic. This means the pulmonary edema was caused by a problem with the heart.  · Noncardiogenic. This means the pulmonary edema was caused by something other than the heart, such as an injury to the lung.  What are the causes?  This condition is commonly caused by heart failure. When this happens, the heart is not able to properly pump blood through the body. This can lead to increased pressure in the heart and blood building up in the veins around the lungs. When blood builds up in these veins, fluid gets pushed into the air sacs of the lung. Heart failure may be caused by:  · Coronary artery  disease.  · High blood pressure.  · Viral infection of the heart (myocarditis).  · Leaky or stiff heart valves.  · Irregular heartbeat (arrhythmia).  · Fluid buildup caused by kidney problems.  Other causes include:  · Infection in the lungs (pneumonia), blood (sepsis), or other part of the body.  · Severe injury to the chest.  · Lung injury from heat or toxins, such as breathing in smoke or poisonous gas.  · Inhaling vomit or water (pulmonaryaspiration).  · Certain medicines.  · High altitude.  What are the signs or symptoms?  Symptoms of this condition include:  · Shortness of breath.  · Coughing with frothy or bloody mucus.  · Wheezing.  · Feeling like you cannot get enough air.  · Shallow and fast breathing.  · Skin that is cool and damp, and has a pale or bluish color.  How is this diagnosed?  This condition is diagnosed based on:  · Your medical history.  · A physical exam.  · Your symptoms.  You may also have other tests, including:  · Chest X-ray.  · Chest CT scan.  · Blood tests, including checking the amount of oxygen in the blood.  · Sputum culture. This test checks for infection in the mucus that you cough up from your lungs.  · Electrocardiogram. This measures the electrical signals of the heart.  · Echocardiogram. This uses an ultrasound to evaluate the health of the heart.  How is this treated?  Initial treatment for this condition focuses on relieving your symptoms. Treatment depends on the underlying cause of the condition. This may include:  · Oxygen therapy. The oxygen may be given through tubes in your nose or through a face mask. In severe cases, a breathing tube is inserted into the windpipe and hooked up to a breathing machine (ventilator).  · Medicines. These may include medicines to:  ? Help the body get rid of extra water (diuretics).  ? Help the heart pump blood properly.  ? Prevent or destroy blood clots.  If poor heart function is the cause, treatment may also include:  · Procedures to  open blocked arteries, repair damaged heart valves, or remove some of the damaged heart muscle.  · A pacemaker to help with heart function.  · A procedure that uses electric shocks to regulate heart rate (cardioversion).  If an infection is the cause, treatment may include antibiotic medicines.  Follow these instructions at home:  Medicines  · Take over-the-counter and prescription medicines only as told by your health care provider.  · If you were prescribed an antibiotic, take it as told by your health care provider. Do not stop taking the antibiotic even if you start to feel better.  · Have a plan with information about each medicine you take. This should include:  ? Why you take the medicine.  ? Possible side effects.  ? Best time of day to take it.  ? Foods to take with it, or foods to avoid when taking it.  ? When to call your health care provider.  · Make a list of each medicine, vitamin, or herbal supplement you take. Keep the list with you at all times. Show it to your health care provider at each visit and before starting a new medicine. Update the list as you add or stop medicines.  Lifestyle    · Exercise regularly as told by your health care provider. It is important to do it safely. You can do this by:  ? Pacing your activities to avoid shortness of breath or chest pain.  ? Resting for at least 1 hour before and after meals.  ? Asking about cardiac rehabilitation programs. These may include education, exercise plans, and counseling.  · Eat a heart-healthy diet that is low in salt (sodium), saturated fat, and cholesterol. Your health care provider may recommend foods that are high in fiber, such as fresh fruits and vegetables, whole grains, and beans.  · Do not use any products that contain nicotine or tobacco, such as cigarettes and e-cigarettes. If you need help quitting, ask your health care provider.  General instructions  · Maintain a healthy weight.  · Keep a record of your weight:  ? Record your  hospital or clinic weight. When you get home, compare it to your scale and record your weight.  ? Weigh yourself first thing each morning after you urinate and before you eat breakfast. Wear the same amount of clothing each time. Record the weights.  ? Share your weight record with your health care provider. Daily weights are important in detecting the body's retention of excess fluid.  ? Tell your health care provider right away if you gain weight quickly. Your medicines may need to be adjusted.  · Check and record your blood pressure as often as told by your health care provider. Bring the records with you to clinic visits.  · Consider therapy or joining a support group. This may help with any stress, fear, or anxiety.  · Keep all follow-up visits as told by your health care provider. This is important.  Get help right away if:  · You gain weight quickly.  · You have severe chest pain, especially if the pain is crushing or pressure-like and spreads to the arms, back, neck, or jaw.  · You have more swelling in your hands, feet, ankles, or abdomen.  · You have nausea.  · You have unusual sweating or your skin turns blue or pale.  · Your shortness of breath gets worse.  · You have dizziness, blurred vision, a headache, or unsteadiness.  · Your blood pressure is higher than 180/120.  · You cough up bloody mucus (sputum).  · You cannot sleep because it is hard to breathe.  · You feel a racing heart beat (palpitations).  · You have anxiety or a feeling that you cannot get enough air.  These symptoms may represent a serious problem that is an emergency. Do not wait to see if the symptoms will go away. Get medical help right away. Call your local emergency services (911 in the [place] Do not drive yourself to the hospital.  Summary  · Pulmonary edema is a condition in which fluid collects in the air sacs of your lungs. If left untreated, it can lead to a medical emergency.  · This condition is most commonly caused by heart  failure. Other causes can include infections or injury to the lungs.  · Take over-the-counter and prescription medicines only as told by your health care provider.  This information is not intended to replace advice given to you by your health care provider. Make sure you discuss any questions you have with your health care provider.  Document Released: 03/09/2004 Document Revised: 02/28/2018 Document Reviewed: 02/28/2018  [employer] Interactive Patient Education © 2019 [employer]

## 2019-10-14 ENCOUNTER — OFFICE VISIT (OUTPATIENT)
Dept: WOUND CARE | Facility: HOSPITAL | Age: 64
End: 2019-10-14

## 2019-10-14 PROCEDURE — G0277 HBOT, FULL BODY CHAMBER, 30M: HCPCS

## 2019-10-15 ENCOUNTER — INFUSION (OUTPATIENT)
Dept: ONCOLOGY | Facility: HOSPITAL | Age: 64
End: 2019-10-15

## 2019-10-15 ENCOUNTER — TRANSCRIBE ORDERS (OUTPATIENT)
Dept: ADMINISTRATIVE | Facility: HOSPITAL | Age: 64
End: 2019-10-15

## 2019-10-15 ENCOUNTER — OFFICE VISIT (OUTPATIENT)
Dept: WOUND CARE | Facility: HOSPITAL | Age: 64
End: 2019-10-15

## 2019-10-15 ENCOUNTER — APPOINTMENT (OUTPATIENT)
Dept: LAB | Facility: HOSPITAL | Age: 64
End: 2019-10-15

## 2019-10-15 VITALS
SYSTOLIC BLOOD PRESSURE: 166 MMHG | RESPIRATION RATE: 18 BRPM | WEIGHT: 315 LBS | HEIGHT: 72 IN | TEMPERATURE: 98.6 F | BODY MASS INDEX: 42.66 KG/M2 | OXYGEN SATURATION: 99 % | DIASTOLIC BLOOD PRESSURE: 73 MMHG | HEART RATE: 93 BPM

## 2019-10-15 DIAGNOSIS — A49.1 INFECTION DUE TO ENTEROCOCCUS: ICD-10-CM

## 2019-10-15 DIAGNOSIS — M86.9 SAPHO SYNDROME (HCC): Primary | ICD-10-CM

## 2019-10-15 DIAGNOSIS — M85.80 SAPHO SYNDROME (HCC): Primary | ICD-10-CM

## 2019-10-15 DIAGNOSIS — L70.9 SAPHO SYNDROME (HCC): Primary | ICD-10-CM

## 2019-10-15 DIAGNOSIS — L40.3 SAPHO SYNDROME (HCC): Primary | ICD-10-CM

## 2019-10-15 DIAGNOSIS — M65.9 SAPHO SYNDROME (HCC): Primary | ICD-10-CM

## 2019-10-15 LAB
ALBUMIN SERPL-MCNC: 4.4 G/DL (ref 3.5–5.2)
ALBUMIN/GLOB SERPL: 1.4 G/DL
ALP SERPL-CCNC: 73 U/L (ref 39–117)
ALT SERPL W P-5'-P-CCNC: 16 U/L (ref 1–41)
ANION GAP SERPL CALCULATED.3IONS-SCNC: 12.8 MMOL/L (ref 5–15)
AST SERPL-CCNC: 15 U/L (ref 1–40)
BASOPHILS # BLD AUTO: 0.06 10*3/MM3 (ref 0–0.2)
BASOPHILS NFR BLD AUTO: 0.7 % (ref 0–1.5)
BILIRUB SERPL-MCNC: 0.4 MG/DL (ref 0.2–1.2)
BUN BLD-MCNC: 22 MG/DL (ref 8–23)
BUN/CREAT SERPL: 13.4 (ref 7–25)
CALCIUM SPEC-SCNC: 9 MG/DL (ref 8.6–10.5)
CHLORIDE SERPL-SCNC: 94 MMOL/L (ref 98–107)
CO2 SERPL-SCNC: 29.2 MMOL/L (ref 22–29)
CREAT BLD-MCNC: 1.64 MG/DL (ref 0.76–1.27)
CRP SERPL-MCNC: 2.04 MG/DL (ref 0–0.5)
DEPRECATED RDW RBC AUTO: 42.4 FL (ref 37–54)
EOSINOPHIL # BLD AUTO: 0.63 10*3/MM3 (ref 0–0.4)
EOSINOPHIL NFR BLD AUTO: 7.4 % (ref 0.3–6.2)
ERYTHROCYTE [DISTWIDTH] IN BLOOD BY AUTOMATED COUNT: 13.8 % (ref 12.3–15.4)
ERYTHROCYTE [SEDIMENTATION RATE] IN BLOOD: 52 MM/HR (ref 0–20)
GFR SERPL CREATININE-BSD FRML MDRD: 43 ML/MIN/1.73
GLOBULIN UR ELPH-MCNC: 3.2 GM/DL
GLUCOSE BLD-MCNC: 129 MG/DL (ref 65–99)
HCT VFR BLD AUTO: 36 % (ref 37.5–51)
HGB BLD-MCNC: 12.2 G/DL (ref 13–17.7)
IMM GRANULOCYTES # BLD AUTO: 0.03 10*3/MM3 (ref 0–0.05)
IMM GRANULOCYTES NFR BLD AUTO: 0.4 % (ref 0–0.5)
LYMPHOCYTES # BLD AUTO: 1.31 10*3/MM3 (ref 0.7–3.1)
LYMPHOCYTES NFR BLD AUTO: 15.4 % (ref 19.6–45.3)
MCH RBC QN AUTO: 29 PG (ref 26.6–33)
MCHC RBC AUTO-ENTMCNC: 33.9 G/DL (ref 31.5–35.7)
MCV RBC AUTO: 85.7 FL (ref 79–97)
MONOCYTES # BLD AUTO: 0.65 10*3/MM3 (ref 0.1–0.9)
MONOCYTES NFR BLD AUTO: 7.7 % (ref 5–12)
NEUTROPHILS # BLD AUTO: 5.8 10*3/MM3 (ref 1.7–7)
NEUTROPHILS NFR BLD AUTO: 68.4 % (ref 42.7–76)
NRBC BLD AUTO-RTO: 0 /100 WBC (ref 0–0.2)
PLATELET # BLD AUTO: 278 10*3/MM3 (ref 140–450)
PMV BLD AUTO: 10.9 FL (ref 6–12)
POTASSIUM BLD-SCNC: 3.8 MMOL/L (ref 3.5–5.2)
PREALB SERPL-MCNC: 18.2 MG/DL (ref 20–40)
PROT SERPL-MCNC: 7.6 G/DL (ref 6–8.5)
RBC # BLD AUTO: 4.2 10*6/MM3 (ref 4.14–5.8)
SODIUM BLD-SCNC: 136 MMOL/L (ref 136–145)
VANCOMYCIN TROUGH SERPL-MCNC: 24.4 MCG/ML (ref 5–20)
WBC NRBC COR # BLD: 8.48 10*3/MM3 (ref 3.4–10.8)

## 2019-10-15 PROCEDURE — 80053 COMPREHEN METABOLIC PANEL: CPT | Performed by: PODIATRIST

## 2019-10-15 PROCEDURE — 80202 ASSAY OF VANCOMYCIN: CPT | Performed by: PODIATRIST

## 2019-10-15 PROCEDURE — 84134 ASSAY OF PREALBUMIN: CPT | Performed by: PODIATRIST

## 2019-10-15 PROCEDURE — 85025 COMPLETE CBC W/AUTO DIFF WBC: CPT | Performed by: PODIATRIST

## 2019-10-15 PROCEDURE — 36415 COLL VENOUS BLD VENIPUNCTURE: CPT | Performed by: PODIATRIST

## 2019-10-15 PROCEDURE — G0463 HOSPITAL OUTPT CLINIC VISIT: HCPCS

## 2019-10-15 PROCEDURE — 85652 RBC SED RATE AUTOMATED: CPT | Performed by: PODIATRIST

## 2019-10-15 PROCEDURE — G0277 HBOT, FULL BODY CHAMBER, 30M: HCPCS

## 2019-10-15 PROCEDURE — 86140 C-REACTIVE PROTEIN: CPT | Performed by: PODIATRIST

## 2019-10-16 ENCOUNTER — OFFICE VISIT (OUTPATIENT)
Dept: WOUND CARE | Facility: HOSPITAL | Age: 64
End: 2019-10-16

## 2019-10-16 PROCEDURE — G0463 HOSPITAL OUTPT CLINIC VISIT: HCPCS

## 2019-10-17 ENCOUNTER — OFFICE VISIT (OUTPATIENT)
Dept: WOUND CARE | Facility: HOSPITAL | Age: 64
End: 2019-10-17

## 2019-10-17 PROCEDURE — G0277 HBOT, FULL BODY CHAMBER, 30M: HCPCS

## 2019-10-18 ENCOUNTER — OFFICE VISIT (OUTPATIENT)
Dept: WOUND CARE | Facility: HOSPITAL | Age: 64
End: 2019-10-18

## 2019-10-18 PROCEDURE — G0277 HBOT, FULL BODY CHAMBER, 30M: HCPCS

## 2019-10-21 ENCOUNTER — OFFICE VISIT (OUTPATIENT)
Dept: WOUND CARE | Facility: HOSPITAL | Age: 64
End: 2019-10-21

## 2019-10-21 PROCEDURE — G0277 HBOT, FULL BODY CHAMBER, 30M: HCPCS

## 2019-10-22 ENCOUNTER — LAB (OUTPATIENT)
Dept: LAB | Facility: HOSPITAL | Age: 64
End: 2019-10-22

## 2019-10-22 ENCOUNTER — INFUSION (OUTPATIENT)
Dept: ONCOLOGY | Facility: HOSPITAL | Age: 64
End: 2019-10-22

## 2019-10-22 ENCOUNTER — OFFICE VISIT (OUTPATIENT)
Dept: WOUND CARE | Facility: HOSPITAL | Age: 64
End: 2019-10-22

## 2019-10-22 VITALS
SYSTOLIC BLOOD PRESSURE: 167 MMHG | RESPIRATION RATE: 16 BRPM | TEMPERATURE: 98.6 F | HEART RATE: 88 BPM | OXYGEN SATURATION: 99 % | DIASTOLIC BLOOD PRESSURE: 63 MMHG | BODY MASS INDEX: 42.66 KG/M2 | WEIGHT: 315 LBS | HEIGHT: 72 IN

## 2019-10-22 DIAGNOSIS — M86.9 SAPHO SYNDROME (HCC): Primary | ICD-10-CM

## 2019-10-22 DIAGNOSIS — E11.628 DIABETIC FOOT INFECTION (HCC): ICD-10-CM

## 2019-10-22 DIAGNOSIS — L70.9 SAPHO SYNDROME (HCC): Primary | ICD-10-CM

## 2019-10-22 DIAGNOSIS — M85.80 SAPHO SYNDROME (HCC): Primary | ICD-10-CM

## 2019-10-22 DIAGNOSIS — M65.9 SAPHO SYNDROME (HCC): Primary | ICD-10-CM

## 2019-10-22 DIAGNOSIS — L08.9 DIABETIC FOOT INFECTION (HCC): ICD-10-CM

## 2019-10-22 DIAGNOSIS — L40.3 SAPHO SYNDROME (HCC): Primary | ICD-10-CM

## 2019-10-22 LAB
ALBUMIN SERPL-MCNC: 4.1 G/DL (ref 3.5–5.2)
ALBUMIN/GLOB SERPL: 1.4 G/DL
ALP SERPL-CCNC: 75 U/L (ref 39–117)
ALT SERPL W P-5'-P-CCNC: 14 U/L (ref 1–41)
ANION GAP SERPL CALCULATED.3IONS-SCNC: 12 MMOL/L (ref 5–15)
AST SERPL-CCNC: 16 U/L (ref 1–40)
BASOPHILS # BLD AUTO: 0.05 10*3/MM3 (ref 0–0.2)
BASOPHILS NFR BLD AUTO: 0.6 % (ref 0–1.5)
BILIRUB SERPL-MCNC: 0.3 MG/DL (ref 0.2–1.2)
BUN BLD-MCNC: 24 MG/DL (ref 8–23)
BUN/CREAT SERPL: 15.3 (ref 7–25)
CALCIUM SPEC-SCNC: 9.1 MG/DL (ref 8.6–10.5)
CHLORIDE SERPL-SCNC: 100 MMOL/L (ref 98–107)
CO2 SERPL-SCNC: 28 MMOL/L (ref 22–29)
CREAT BLD-MCNC: 1.57 MG/DL (ref 0.76–1.27)
CRP SERPL-MCNC: 1.24 MG/DL (ref 0–0.5)
DEPRECATED RDW RBC AUTO: 42.5 FL (ref 37–54)
EOSINOPHIL # BLD AUTO: 0.51 10*3/MM3 (ref 0–0.4)
EOSINOPHIL NFR BLD AUTO: 6 % (ref 0.3–6.2)
ERYTHROCYTE [DISTWIDTH] IN BLOOD BY AUTOMATED COUNT: 13.6 % (ref 12.3–15.4)
ERYTHROCYTE [SEDIMENTATION RATE] IN BLOOD: 34 MM/HR (ref 0–15)
GFR SERPL CREATININE-BSD FRML MDRD: 45 ML/MIN/1.73
GLOBULIN UR ELPH-MCNC: 3 GM/DL
GLUCOSE BLD-MCNC: 212 MG/DL (ref 65–99)
HBA1C MFR BLD: 9.1 % (ref 4.8–5.6)
HCT VFR BLD AUTO: 32 % (ref 37.5–51)
HGB BLD-MCNC: 10.7 G/DL (ref 13–17.7)
IMM GRANULOCYTES # BLD AUTO: 0.05 10*3/MM3 (ref 0–0.05)
IMM GRANULOCYTES NFR BLD AUTO: 0.6 % (ref 0–0.5)
LYMPHOCYTES # BLD AUTO: 1.28 10*3/MM3 (ref 0.7–3.1)
LYMPHOCYTES NFR BLD AUTO: 15 % (ref 19.6–45.3)
MCH RBC QN AUTO: 28.7 PG (ref 26.6–33)
MCHC RBC AUTO-ENTMCNC: 33.4 G/DL (ref 31.5–35.7)
MCV RBC AUTO: 85.8 FL (ref 79–97)
MONOCYTES # BLD AUTO: 0.68 10*3/MM3 (ref 0.1–0.9)
MONOCYTES NFR BLD AUTO: 8 % (ref 5–12)
NEUTROPHILS # BLD AUTO: 5.95 10*3/MM3 (ref 1.7–7)
NEUTROPHILS NFR BLD AUTO: 69.8 % (ref 42.7–76)
NRBC BLD AUTO-RTO: 0 /100 WBC (ref 0–0.2)
PLATELET # BLD AUTO: 258 10*3/MM3 (ref 140–450)
PMV BLD AUTO: 10.7 FL (ref 6–12)
POTASSIUM BLD-SCNC: 4.3 MMOL/L (ref 3.5–5.2)
PREALB SERPL-MCNC: 19.9 MG/DL (ref 20–40)
PROT SERPL-MCNC: 7.1 G/DL (ref 6–8.5)
RBC # BLD AUTO: 3.73 10*6/MM3 (ref 4.14–5.8)
SODIUM BLD-SCNC: 140 MMOL/L (ref 136–145)
VANCOMYCIN TROUGH SERPL-MCNC: 20.2 MCG/ML (ref 5–20)
WBC NRBC COR # BLD: 8.52 10*3/MM3 (ref 3.4–10.8)

## 2019-10-22 PROCEDURE — G0277 HBOT, FULL BODY CHAMBER, 30M: HCPCS

## 2019-10-22 PROCEDURE — 84134 ASSAY OF PREALBUMIN: CPT

## 2019-10-22 PROCEDURE — 83036 HEMOGLOBIN GLYCOSYLATED A1C: CPT | Performed by: PODIATRIST

## 2019-10-22 PROCEDURE — 86140 C-REACTIVE PROTEIN: CPT

## 2019-10-22 PROCEDURE — G0463 HOSPITAL OUTPT CLINIC VISIT: HCPCS

## 2019-10-22 PROCEDURE — 80053 COMPREHEN METABOLIC PANEL: CPT

## 2019-10-22 PROCEDURE — 80202 ASSAY OF VANCOMYCIN: CPT

## 2019-10-22 PROCEDURE — 36415 COLL VENOUS BLD VENIPUNCTURE: CPT | Performed by: PODIATRIST

## 2019-10-22 PROCEDURE — 85651 RBC SED RATE NONAUTOMATED: CPT

## 2019-10-22 PROCEDURE — 85025 COMPLETE CBC W/AUTO DIFF WBC: CPT

## 2019-10-23 ENCOUNTER — OFFICE VISIT (OUTPATIENT)
Dept: WOUND CARE | Facility: HOSPITAL | Age: 64
End: 2019-10-23

## 2019-10-23 PROCEDURE — G0277 HBOT, FULL BODY CHAMBER, 30M: HCPCS

## 2019-10-24 ENCOUNTER — APPOINTMENT (OUTPATIENT)
Dept: WOUND CARE | Facility: HOSPITAL | Age: 64
End: 2019-10-24

## 2019-10-27 ENCOUNTER — APPOINTMENT (OUTPATIENT)
Dept: GENERAL RADIOLOGY | Facility: HOSPITAL | Age: 64
End: 2019-10-27

## 2019-10-27 ENCOUNTER — APPOINTMENT (OUTPATIENT)
Dept: CT IMAGING | Facility: HOSPITAL | Age: 64
End: 2019-10-27

## 2019-10-27 ENCOUNTER — HOSPITAL ENCOUNTER (INPATIENT)
Facility: HOSPITAL | Age: 64
LOS: 10 days | Discharge: NURSING, REHAB OR LONG-TERM CARE | End: 2019-11-06
Attending: FAMILY MEDICINE | Admitting: FAMILY MEDICINE

## 2019-10-27 DIAGNOSIS — N17.9 AKI (ACUTE KIDNEY INJURY) (HCC): Primary | ICD-10-CM

## 2019-10-27 DIAGNOSIS — I50.9 CONGESTIVE HEART FAILURE, UNSPECIFIED HF CHRONICITY, UNSPECIFIED HEART FAILURE TYPE (HCC): ICD-10-CM

## 2019-10-27 DIAGNOSIS — N39.0 URINARY TRACT INFECTION WITHOUT HEMATURIA, SITE UNSPECIFIED: ICD-10-CM

## 2019-10-27 DIAGNOSIS — Z74.09 DECREASED MOBILITY AND ENDURANCE: ICD-10-CM

## 2019-10-27 LAB
ALBUMIN SERPL-MCNC: 3.9 G/DL (ref 3.5–5.2)
ALBUMIN/GLOB SERPL: 1.2 G/DL
ALP SERPL-CCNC: 81 U/L (ref 39–117)
ALT SERPL W P-5'-P-CCNC: 18 U/L (ref 1–41)
ANION GAP SERPL CALCULATED.3IONS-SCNC: 14 MMOL/L (ref 5–15)
APTT PPP: 30.1 SECONDS (ref 24.1–35)
AST SERPL-CCNC: 19 U/L (ref 1–40)
BACTERIA UR QL AUTO: ABNORMAL /HPF
BASOPHILS # BLD AUTO: 0.07 10*3/MM3 (ref 0–0.2)
BASOPHILS NFR BLD AUTO: 0.5 % (ref 0–1.5)
BILIRUB SERPL-MCNC: 0.3 MG/DL (ref 0.2–1.2)
BILIRUB UR QL STRIP: NEGATIVE
BUN BLD-MCNC: 29 MG/DL (ref 8–23)
BUN/CREAT SERPL: 14.9 (ref 7–25)
CALCIUM SPEC-SCNC: 9.1 MG/DL (ref 8.6–10.5)
CHLORIDE SERPL-SCNC: 99 MMOL/L (ref 98–107)
CLARITY UR: CLEAR
CO2 SERPL-SCNC: 25 MMOL/L (ref 22–29)
COLOR UR: YELLOW
CREAT BLD-MCNC: 1.95 MG/DL (ref 0.76–1.27)
CRP SERPL-MCNC: 3.9 MG/DL (ref 0–0.5)
D DIMER PPP FEU-MCNC: 1.34 MG/L (FEU) (ref 0–0.5)
D-LACTATE SERPL-SCNC: 1.5 MMOL/L (ref 0.5–2)
DEPRECATED RDW RBC AUTO: 41.9 FL (ref 37–54)
EOSINOPHIL # BLD AUTO: 0.36 10*3/MM3 (ref 0–0.4)
EOSINOPHIL NFR BLD AUTO: 2.7 % (ref 0.3–6.2)
ERYTHROCYTE [DISTWIDTH] IN BLOOD BY AUTOMATED COUNT: 13.5 % (ref 12.3–15.4)
ERYTHROCYTE [SEDIMENTATION RATE] IN BLOOD: 50 MM/HR (ref 0–15)
GFR SERPL CREATININE-BSD FRML MDRD: 35 ML/MIN/1.73
GLOBULIN UR ELPH-MCNC: 3.3 GM/DL
GLUCOSE BLD-MCNC: 75 MG/DL (ref 65–99)
GLUCOSE BLDC GLUCOMTR-MCNC: 84 MG/DL (ref 70–130)
GLUCOSE UR STRIP-MCNC: NEGATIVE MG/DL
HCT VFR BLD AUTO: 31.6 % (ref 37.5–51)
HGB BLD-MCNC: 10.6 G/DL (ref 13–17.7)
HGB UR QL STRIP.AUTO: NEGATIVE
HOLD SPECIMEN: NORMAL
HYALINE CASTS UR QL AUTO: ABNORMAL /LPF
IMM GRANULOCYTES # BLD AUTO: 0.08 10*3/MM3 (ref 0–0.05)
IMM GRANULOCYTES NFR BLD AUTO: 0.6 % (ref 0–0.5)
INR PPP: 0.99 (ref 0.91–1.09)
KETONES UR QL STRIP: NEGATIVE
LEUKOCYTE ESTERASE UR QL STRIP.AUTO: ABNORMAL
LIPASE SERPL-CCNC: 38 U/L (ref 13–60)
LYMPHOCYTES # BLD AUTO: 1.43 10*3/MM3 (ref 0.7–3.1)
LYMPHOCYTES NFR BLD AUTO: 10.6 % (ref 19.6–45.3)
MCH RBC QN AUTO: 28.3 PG (ref 26.6–33)
MCHC RBC AUTO-ENTMCNC: 33.5 G/DL (ref 31.5–35.7)
MCV RBC AUTO: 84.5 FL (ref 79–97)
MONOCYTES # BLD AUTO: 1.29 10*3/MM3 (ref 0.1–0.9)
MONOCYTES NFR BLD AUTO: 9.6 % (ref 5–12)
NEUTROPHILS # BLD AUTO: 10.23 10*3/MM3 (ref 1.7–7)
NEUTROPHILS NFR BLD AUTO: 76 % (ref 42.7–76)
NITRITE UR QL STRIP: NEGATIVE
NRBC BLD AUTO-RTO: 0 /100 WBC (ref 0–0.2)
NT-PROBNP SERPL-MCNC: 1374 PG/ML (ref 5–900)
PH UR STRIP.AUTO: 5.5 [PH] (ref 5–8)
PLATELET # BLD AUTO: 358 10*3/MM3 (ref 140–450)
PMV BLD AUTO: 10.7 FL (ref 6–12)
POTASSIUM BLD-SCNC: 4.4 MMOL/L (ref 3.5–5.2)
PROT SERPL-MCNC: 7.2 G/DL (ref 6–8.5)
PROT UR QL STRIP: ABNORMAL
PROTHROMBIN TIME: 13.4 SECONDS (ref 11.9–14.6)
RBC # BLD AUTO: 3.74 10*6/MM3 (ref 4.14–5.8)
RBC # UR: ABNORMAL /HPF
REF LAB TEST METHOD: ABNORMAL
SODIUM BLD-SCNC: 138 MMOL/L (ref 136–145)
SP GR UR STRIP: 1.02 (ref 1–1.03)
SQUAMOUS #/AREA URNS HPF: ABNORMAL /HPF
TROPONIN T SERPL-MCNC: 0.01 NG/ML (ref 0–0.03)
TROPONIN T SERPL-MCNC: 0.03 NG/ML (ref 0–0.03)
UROBILINOGEN UR QL STRIP: ABNORMAL
WBC NRBC COR # BLD: 13.46 10*3/MM3 (ref 3.4–10.8)
WBC UR QL AUTO: ABNORMAL /HPF
WHOLE BLOOD HOLD SPECIMEN: NORMAL
WHOLE BLOOD HOLD SPECIMEN: NORMAL

## 2019-10-27 PROCEDURE — 84484 ASSAY OF TROPONIN QUANT: CPT | Performed by: FAMILY MEDICINE

## 2019-10-27 PROCEDURE — 85651 RBC SED RATE NONAUTOMATED: CPT | Performed by: FAMILY MEDICINE

## 2019-10-27 PROCEDURE — 94799 UNLISTED PULMONARY SVC/PX: CPT

## 2019-10-27 PROCEDURE — 85730 THROMBOPLASTIN TIME PARTIAL: CPT | Performed by: FAMILY MEDICINE

## 2019-10-27 PROCEDURE — 83880 ASSAY OF NATRIURETIC PEPTIDE: CPT | Performed by: FAMILY MEDICINE

## 2019-10-27 PROCEDURE — 93005 ELECTROCARDIOGRAM TRACING: CPT | Performed by: FAMILY MEDICINE

## 2019-10-27 PROCEDURE — 84484 ASSAY OF TROPONIN QUANT: CPT | Performed by: INTERNAL MEDICINE

## 2019-10-27 PROCEDURE — 83605 ASSAY OF LACTIC ACID: CPT | Performed by: FAMILY MEDICINE

## 2019-10-27 PROCEDURE — 25010000002 VANCOMYCIN 1 G RECONSTITUTED SOLUTION 1 EACH VIAL: Performed by: FAMILY MEDICINE

## 2019-10-27 PROCEDURE — 80053 COMPREHEN METABOLIC PANEL: CPT | Performed by: FAMILY MEDICINE

## 2019-10-27 PROCEDURE — 0 IOPAMIDOL PER 1 ML: Performed by: FAMILY MEDICINE

## 2019-10-27 PROCEDURE — 71045 X-RAY EXAM CHEST 1 VIEW: CPT

## 2019-10-27 PROCEDURE — 25010000002 METHYLNALTREXONE 12 MG/0.6ML SOLUTION: Performed by: INTERNAL MEDICINE

## 2019-10-27 PROCEDURE — 86140 C-REACTIVE PROTEIN: CPT | Performed by: FAMILY MEDICINE

## 2019-10-27 PROCEDURE — 63710000001 INSULIN DETEMIR PER 5 UNITS: Performed by: INTERNAL MEDICINE

## 2019-10-27 PROCEDURE — 25010000002 MORPHINE PER 10 MG: Performed by: FAMILY MEDICINE

## 2019-10-27 PROCEDURE — 25010000002 ONDANSETRON PER 1 MG: Performed by: FAMILY MEDICINE

## 2019-10-27 PROCEDURE — 85025 COMPLETE CBC W/AUTO DIFF WBC: CPT | Performed by: FAMILY MEDICINE

## 2019-10-27 PROCEDURE — 82962 GLUCOSE BLOOD TEST: CPT

## 2019-10-27 PROCEDURE — 93010 ELECTROCARDIOGRAM REPORT: CPT | Performed by: INTERNAL MEDICINE

## 2019-10-27 PROCEDURE — 83690 ASSAY OF LIPASE: CPT | Performed by: FAMILY MEDICINE

## 2019-10-27 PROCEDURE — 87040 BLOOD CULTURE FOR BACTERIA: CPT | Performed by: FAMILY MEDICINE

## 2019-10-27 PROCEDURE — 94760 N-INVAS EAR/PLS OXIMETRY 1: CPT

## 2019-10-27 PROCEDURE — 74177 CT ABD & PELVIS W/CONTRAST: CPT

## 2019-10-27 PROCEDURE — 25010000002 FUROSEMIDE PER 20 MG: Performed by: FAMILY MEDICINE

## 2019-10-27 PROCEDURE — 71275 CT ANGIOGRAPHY CHEST: CPT

## 2019-10-27 PROCEDURE — 25010000002 ENOXAPARIN PER 10 MG: Performed by: INTERNAL MEDICINE

## 2019-10-27 PROCEDURE — 99284 EMERGENCY DEPT VISIT MOD MDM: CPT

## 2019-10-27 PROCEDURE — 25010000002 CEFTRIAXONE PER 250 MG: Performed by: FAMILY MEDICINE

## 2019-10-27 PROCEDURE — 85379 FIBRIN DEGRADATION QUANT: CPT | Performed by: FAMILY MEDICINE

## 2019-10-27 PROCEDURE — 81001 URINALYSIS AUTO W/SCOPE: CPT | Performed by: FAMILY MEDICINE

## 2019-10-27 PROCEDURE — 85610 PROTHROMBIN TIME: CPT | Performed by: FAMILY MEDICINE

## 2019-10-27 RX ORDER — SACCHAROMYCES BOULARDII 250 MG
250 CAPSULE ORAL 2 TIMES DAILY
Status: DISCONTINUED | OUTPATIENT
Start: 2019-10-27 | End: 2019-11-06 | Stop reason: HOSPADM

## 2019-10-27 RX ORDER — ONDANSETRON 8 MG/1
8 TABLET, ORALLY DISINTEGRATING ORAL EVERY 6 HOURS PRN
Status: DISCONTINUED | OUTPATIENT
Start: 2019-10-27 | End: 2019-10-31

## 2019-10-27 RX ORDER — IRBESARTAN 300 MG/1
300 TABLET ORAL DAILY
Status: ON HOLD | COMMUNITY
End: 2019-10-28

## 2019-10-27 RX ORDER — SODIUM CHLORIDE 0.9 % (FLUSH) 0.9 %
10 SYRINGE (ML) INJECTION AS NEEDED
Status: DISCONTINUED | OUTPATIENT
Start: 2019-10-27 | End: 2019-11-06 | Stop reason: HOSPADM

## 2019-10-27 RX ORDER — SODIUM CHLORIDE 0.9 % (FLUSH) 0.9 %
10 SYRINGE (ML) INJECTION EVERY 12 HOURS SCHEDULED
Status: DISCONTINUED | OUTPATIENT
Start: 2019-10-27 | End: 2019-11-06 | Stop reason: HOSPADM

## 2019-10-27 RX ORDER — ROSUVASTATIN CALCIUM 20 MG/1
40 TABLET, COATED ORAL DAILY
Status: DISCONTINUED | OUTPATIENT
Start: 2019-10-28 | End: 2019-11-06 | Stop reason: HOSPADM

## 2019-10-27 RX ORDER — FUROSEMIDE 10 MG/ML
40 INJECTION INTRAMUSCULAR; INTRAVENOUS ONCE
Status: COMPLETED | OUTPATIENT
Start: 2019-10-27 | End: 2019-10-27

## 2019-10-27 RX ORDER — PANTOPRAZOLE SODIUM 40 MG/1
40 TABLET, DELAYED RELEASE ORAL DAILY
Status: DISCONTINUED | OUTPATIENT
Start: 2019-10-28 | End: 2019-11-01

## 2019-10-27 RX ORDER — OXYCODONE AND ACETAMINOPHEN 10; 325 MG/1; MG/1
0.5 TABLET ORAL 4 TIMES DAILY PRN
Status: DISCONTINUED | OUTPATIENT
Start: 2019-10-27 | End: 2019-10-28

## 2019-10-27 RX ORDER — ONDANSETRON 2 MG/ML
4 INJECTION INTRAMUSCULAR; INTRAVENOUS EVERY 6 HOURS PRN
Status: DISCONTINUED | OUTPATIENT
Start: 2019-10-27 | End: 2019-10-31

## 2019-10-27 RX ORDER — ACETAMINOPHEN 160 MG/5ML
650 SOLUTION ORAL EVERY 4 HOURS PRN
Status: DISCONTINUED | OUTPATIENT
Start: 2019-10-27 | End: 2019-11-06 | Stop reason: HOSPADM

## 2019-10-27 RX ORDER — LOSARTAN POTASSIUM 50 MG/1
100 TABLET ORAL
Status: DISCONTINUED | OUTPATIENT
Start: 2019-10-28 | End: 2019-11-04

## 2019-10-27 RX ORDER — ONDANSETRON 2 MG/ML
4 INJECTION INTRAMUSCULAR; INTRAVENOUS ONCE
Status: COMPLETED | OUTPATIENT
Start: 2019-10-27 | End: 2019-10-27

## 2019-10-27 RX ORDER — DEXTROSE MONOHYDRATE 25 G/50ML
25 INJECTION, SOLUTION INTRAVENOUS
Status: DISCONTINUED | OUTPATIENT
Start: 2019-10-27 | End: 2019-10-30

## 2019-10-27 RX ORDER — DULOXETIN HYDROCHLORIDE 30 MG/1
60 CAPSULE, DELAYED RELEASE ORAL DAILY
Status: DISCONTINUED | OUTPATIENT
Start: 2019-10-28 | End: 2019-11-06 | Stop reason: HOSPADM

## 2019-10-27 RX ORDER — ACETAMINOPHEN 325 MG/1
650 TABLET ORAL EVERY 4 HOURS PRN
Status: DISCONTINUED | OUTPATIENT
Start: 2019-10-27 | End: 2019-11-06 | Stop reason: HOSPADM

## 2019-10-27 RX ORDER — ASPIRIN 81 MG/1
81 TABLET ORAL DAILY
Status: DISCONTINUED | OUTPATIENT
Start: 2019-10-28 | End: 2019-11-06 | Stop reason: HOSPADM

## 2019-10-27 RX ORDER — ASPIRIN 81 MG/1
324 TABLET, CHEWABLE ORAL ONCE
Status: COMPLETED | OUTPATIENT
Start: 2019-10-27 | End: 2019-10-27

## 2019-10-27 RX ORDER — TRAZODONE HYDROCHLORIDE 50 MG/1
25 TABLET ORAL NIGHTLY PRN
Status: DISCONTINUED | OUTPATIENT
Start: 2019-10-27 | End: 2019-11-06 | Stop reason: HOSPADM

## 2019-10-27 RX ORDER — IPRATROPIUM BROMIDE AND ALBUTEROL SULFATE 2.5; .5 MG/3ML; MG/3ML
3 SOLUTION RESPIRATORY (INHALATION) EVERY 6 HOURS PRN
Status: DISCONTINUED | OUTPATIENT
Start: 2019-10-27 | End: 2019-11-06 | Stop reason: HOSPADM

## 2019-10-27 RX ORDER — GABAPENTIN 100 MG/1
100 CAPSULE ORAL 2 TIMES DAILY
Status: DISCONTINUED | OUTPATIENT
Start: 2019-10-27 | End: 2019-11-06 | Stop reason: HOSPADM

## 2019-10-27 RX ORDER — ACETAMINOPHEN 650 MG/1
650 SUPPOSITORY RECTAL EVERY 4 HOURS PRN
Status: DISCONTINUED | OUTPATIENT
Start: 2019-10-27 | End: 2019-11-06 | Stop reason: HOSPADM

## 2019-10-27 RX ORDER — LACTULOSE 20 G/30ML
20 SOLUTION ORAL 2 TIMES DAILY
Status: DISCONTINUED | OUTPATIENT
Start: 2019-10-27 | End: 2019-11-06 | Stop reason: HOSPADM

## 2019-10-27 RX ORDER — NICOTINE POLACRILEX 4 MG
15 LOZENGE BUCCAL
Status: DISCONTINUED | OUTPATIENT
Start: 2019-10-27 | End: 2019-10-30

## 2019-10-27 RX ADMIN — IOPAMIDOL 150 ML: 755 INJECTION, SOLUTION INTRAVENOUS at 19:01

## 2019-10-27 RX ADMIN — Medication 250 MG: at 21:29

## 2019-10-27 RX ADMIN — INSULIN DETEMIR 20 UNITS: 100 INJECTION, SOLUTION SUBCUTANEOUS at 21:34

## 2019-10-27 RX ADMIN — SODIUM CHLORIDE, PRESERVATIVE FREE 10 ML: 5 INJECTION INTRAVENOUS at 21:45

## 2019-10-27 RX ADMIN — ASPIRIN 81 MG 324 MG: 81 TABLET ORAL at 18:22

## 2019-10-27 RX ADMIN — FUROSEMIDE 40 MG: 10 INJECTION, SOLUTION INTRAVENOUS at 19:32

## 2019-10-27 RX ADMIN — LACTULOSE 20 G: 20 SOLUTION ORAL at 21:30

## 2019-10-27 RX ADMIN — CEFTRIAXONE SODIUM 1 G: 1 INJECTION, POWDER, FOR SOLUTION INTRAMUSCULAR; INTRAVENOUS at 19:32

## 2019-10-27 RX ADMIN — BUMETANIDE 1 MG/HR: 0.25 INJECTION INTRAMUSCULAR; INTRAVENOUS at 23:16

## 2019-10-27 RX ADMIN — GABAPENTIN 100 MG: 100 CAPSULE ORAL at 21:29

## 2019-10-27 RX ADMIN — MORPHINE SULFATE 4 MG: 4 INJECTION, SOLUTION INTRAMUSCULAR; INTRAVENOUS at 18:20

## 2019-10-27 RX ADMIN — OXYCODONE HYDROCHLORIDE AND ACETAMINOPHEN 0.5 TABLET: 10; 325 TABLET ORAL at 21:57

## 2019-10-27 RX ADMIN — ENOXAPARIN SODIUM 30 MG: 30 INJECTION SUBCUTANEOUS at 21:29

## 2019-10-27 RX ADMIN — METHYLNALTREXONE BROMIDE 8 MG: 12 INJECTION, SOLUTION SUBCUTANEOUS at 21:30

## 2019-10-27 RX ADMIN — ONDANSETRON HYDROCHLORIDE 4 MG: 2 SOLUTION INTRAMUSCULAR; INTRAVENOUS at 18:21

## 2019-10-27 RX ADMIN — VANCOMYCIN HYDROCHLORIDE 2000 MG: 1 INJECTION, POWDER, LYOPHILIZED, FOR SOLUTION INTRAVENOUS at 21:14

## 2019-10-28 ENCOUNTER — APPOINTMENT (OUTPATIENT)
Dept: WOUND CARE | Facility: HOSPITAL | Age: 64
End: 2019-10-28

## 2019-10-28 PROBLEM — I50.9 CHF (CONGESTIVE HEART FAILURE) (HCC): Status: ACTIVE | Noted: 2019-10-28

## 2019-10-28 LAB
ALBUMIN SERPL-MCNC: 4.2 G/DL (ref 3.5–5.2)
ALBUMIN/GLOB SERPL: 1.3 G/DL
ALP SERPL-CCNC: 86 U/L (ref 39–117)
ALT SERPL W P-5'-P-CCNC: 17 U/L (ref 1–41)
ANION GAP SERPL CALCULATED.3IONS-SCNC: 14 MMOL/L (ref 5–15)
AST SERPL-CCNC: 15 U/L (ref 1–40)
BASOPHILS # BLD AUTO: 0.05 10*3/MM3 (ref 0–0.2)
BASOPHILS NFR BLD AUTO: 0.6 % (ref 0–1.5)
BILIRUB SERPL-MCNC: 0.4 MG/DL (ref 0.2–1.2)
BUN BLD-MCNC: 30 MG/DL (ref 8–23)
BUN/CREAT SERPL: 16.1 (ref 7–25)
CALCIUM SPEC-SCNC: 9.2 MG/DL (ref 8.6–10.5)
CHLORIDE SERPL-SCNC: 95 MMOL/L (ref 98–107)
CHOLEST SERPL-MCNC: 126 MG/DL (ref 0–200)
CO2 SERPL-SCNC: 29 MMOL/L (ref 22–29)
CREAT BLD-MCNC: 1.86 MG/DL (ref 0.76–1.27)
CRP SERPL-MCNC: 3.89 MG/DL (ref 0–0.5)
DEPRECATED RDW RBC AUTO: 42.4 FL (ref 37–54)
EOSINOPHIL # BLD AUTO: 0.35 10*3/MM3 (ref 0–0.4)
EOSINOPHIL NFR BLD AUTO: 4 % (ref 0.3–6.2)
ERYTHROCYTE [DISTWIDTH] IN BLOOD BY AUTOMATED COUNT: 13.5 % (ref 12.3–15.4)
ERYTHROCYTE [SEDIMENTATION RATE] IN BLOOD: 24 MM/HR (ref 0–15)
GFR SERPL CREATININE-BSD FRML MDRD: 37 ML/MIN/1.73
GLOBULIN UR ELPH-MCNC: 3.3 GM/DL
GLUCOSE BLD-MCNC: 353 MG/DL (ref 65–99)
GLUCOSE BLDC GLUCOMTR-MCNC: 114 MG/DL (ref 70–130)
GLUCOSE BLDC GLUCOMTR-MCNC: 135 MG/DL (ref 70–130)
GLUCOSE BLDC GLUCOMTR-MCNC: 333 MG/DL (ref 70–130)
GLUCOSE BLDC GLUCOMTR-MCNC: 373 MG/DL (ref 70–130)
HCT VFR BLD AUTO: 33.6 % (ref 37.5–51)
HDLC SERPL-MCNC: 43 MG/DL (ref 40–60)
HGB BLD-MCNC: 11 G/DL (ref 13–17.7)
IMM GRANULOCYTES # BLD AUTO: 0.04 10*3/MM3 (ref 0–0.05)
IMM GRANULOCYTES NFR BLD AUTO: 0.5 % (ref 0–0.5)
LDLC SERPL CALC-MCNC: 60 MG/DL (ref 0–100)
LDLC/HDLC SERPL: 1.4 {RATIO}
LYMPHOCYTES # BLD AUTO: 1.3 10*3/MM3 (ref 0.7–3.1)
LYMPHOCYTES NFR BLD AUTO: 14.7 % (ref 19.6–45.3)
MAGNESIUM SERPL-MCNC: 2.1 MG/DL (ref 1.6–2.4)
MCH RBC QN AUTO: 27.9 PG (ref 26.6–33)
MCHC RBC AUTO-ENTMCNC: 32.7 G/DL (ref 31.5–35.7)
MCV RBC AUTO: 85.3 FL (ref 79–97)
MONOCYTES # BLD AUTO: 0.76 10*3/MM3 (ref 0.1–0.9)
MONOCYTES NFR BLD AUTO: 8.6 % (ref 5–12)
NEUTROPHILS # BLD AUTO: 6.33 10*3/MM3 (ref 1.7–7)
NEUTROPHILS NFR BLD AUTO: 71.6 % (ref 42.7–76)
NRBC BLD AUTO-RTO: 0 /100 WBC (ref 0–0.2)
PHOSPHATE SERPL-MCNC: 3.7 MG/DL (ref 2.5–4.5)
PLATELET # BLD AUTO: 324 10*3/MM3 (ref 140–450)
PMV BLD AUTO: 10.5 FL (ref 6–12)
POTASSIUM BLD-SCNC: 4.6 MMOL/L (ref 3.5–5.2)
PROT SERPL-MCNC: 7.5 G/DL (ref 6–8.5)
PROT UR-MCNC: 22 MG/DL
RBC # BLD AUTO: 3.94 10*6/MM3 (ref 4.14–5.8)
SODIUM BLD-SCNC: 138 MMOL/L (ref 136–145)
TRIGL SERPL-MCNC: 113 MG/DL (ref 0–150)
TROPONIN T SERPL-MCNC: 0.03 NG/ML (ref 0–0.03)
VLDLC SERPL-MCNC: 22.6 MG/DL
WBC NRBC COR # BLD: 8.83 10*3/MM3 (ref 3.4–10.8)

## 2019-10-28 PROCEDURE — 84484 ASSAY OF TROPONIN QUANT: CPT | Performed by: INTERNAL MEDICINE

## 2019-10-28 PROCEDURE — 25010000002 CEFTRIAXONE PER 250 MG: Performed by: INTERNAL MEDICINE

## 2019-10-28 PROCEDURE — 83735 ASSAY OF MAGNESIUM: CPT | Performed by: INTERNAL MEDICINE

## 2019-10-28 PROCEDURE — 82570 ASSAY OF URINE CREATININE: CPT | Performed by: NURSE PRACTITIONER

## 2019-10-28 PROCEDURE — 25010000002 ONDANSETRON PER 1 MG: Performed by: INTERNAL MEDICINE

## 2019-10-28 PROCEDURE — 99222 1ST HOSP IP/OBS MODERATE 55: CPT | Performed by: PODIATRIST

## 2019-10-28 PROCEDURE — 63710000001 INSULIN DETEMIR PER 5 UNITS: Performed by: INTERNAL MEDICINE

## 2019-10-28 PROCEDURE — 93005 ELECTROCARDIOGRAM TRACING: CPT | Performed by: INTERNAL MEDICINE

## 2019-10-28 PROCEDURE — 85651 RBC SED RATE NONAUTOMATED: CPT | Performed by: INTERNAL MEDICINE

## 2019-10-28 PROCEDURE — 99253 IP/OBS CNSLTJ NEW/EST LOW 45: CPT | Performed by: NURSE PRACTITIONER

## 2019-10-28 PROCEDURE — 93010 ELECTROCARDIOGRAM REPORT: CPT | Performed by: INTERNAL MEDICINE

## 2019-10-28 PROCEDURE — 25010000002 VANCOMYCIN 10 G RECONSTITUTED SOLUTION: Performed by: INTERNAL MEDICINE

## 2019-10-28 PROCEDURE — 84100 ASSAY OF PHOSPHORUS: CPT | Performed by: INTERNAL MEDICINE

## 2019-10-28 PROCEDURE — 25010000002 ENOXAPARIN PER 10 MG: Performed by: FAMILY MEDICINE

## 2019-10-28 PROCEDURE — 82962 GLUCOSE BLOOD TEST: CPT

## 2019-10-28 PROCEDURE — 80061 LIPID PANEL: CPT | Performed by: INTERNAL MEDICINE

## 2019-10-28 PROCEDURE — 97166 OT EVAL MOD COMPLEX 45 MIN: CPT | Performed by: OCCUPATIONAL THERAPIST

## 2019-10-28 PROCEDURE — 85025 COMPLETE CBC W/AUTO DIFF WBC: CPT | Performed by: INTERNAL MEDICINE

## 2019-10-28 PROCEDURE — 63710000001 INSULIN REGULAR HUMAN PER 5 UNITS: Performed by: INTERNAL MEDICINE

## 2019-10-28 PROCEDURE — 86140 C-REACTIVE PROTEIN: CPT | Performed by: INTERNAL MEDICINE

## 2019-10-28 PROCEDURE — 84156 ASSAY OF PROTEIN URINE: CPT | Performed by: NURSE PRACTITIONER

## 2019-10-28 PROCEDURE — 80053 COMPREHEN METABOLIC PANEL: CPT | Performed by: INTERNAL MEDICINE

## 2019-10-28 PROCEDURE — 97162 PT EVAL MOD COMPLEX 30 MIN: CPT

## 2019-10-28 PROCEDURE — 63710000001 INSULIN LISPRO (HUMAN) PER 5 UNITS: Performed by: INTERNAL MEDICINE

## 2019-10-28 RX ORDER — SACCHAROMYCES BOULARDII 250 MG
250 CAPSULE ORAL 2 TIMES DAILY
COMMUNITY
End: 2020-01-27 | Stop reason: ALTCHOICE

## 2019-10-28 RX ORDER — OXYCODONE AND ACETAMINOPHEN 10; 325 MG/1; MG/1
0.5 TABLET ORAL EVERY 4 HOURS PRN
Status: DISCONTINUED | OUTPATIENT
Start: 2019-10-28 | End: 2019-10-28

## 2019-10-28 RX ORDER — OXYCODONE AND ACETAMINOPHEN 7.5; 325 MG/1; MG/1
1 TABLET ORAL EVERY 4 HOURS PRN
Status: DISCONTINUED | OUTPATIENT
Start: 2019-10-28 | End: 2019-11-06 | Stop reason: HOSPADM

## 2019-10-28 RX ORDER — LATANOPROST 50 UG/ML
1 SOLUTION/ DROPS OPHTHALMIC NIGHTLY
Status: DISCONTINUED | OUTPATIENT
Start: 2019-10-28 | End: 2019-11-06 | Stop reason: HOSPADM

## 2019-10-28 RX ORDER — OXYCODONE AND ACETAMINOPHEN 10; 325 MG/1; MG/1
1 TABLET ORAL 2 TIMES DAILY
COMMUNITY
End: 2021-03-11 | Stop reason: SDUPTHER

## 2019-10-28 RX ORDER — LATANOPROST 50 UG/ML
1 SOLUTION/ DROPS OPHTHALMIC NIGHTLY
COMMUNITY
End: 2021-01-18

## 2019-10-28 RX ORDER — LOSARTAN POTASSIUM 100 MG/1
100 TABLET ORAL DAILY
Status: ON HOLD | COMMUNITY
End: 2020-01-22 | Stop reason: ALTCHOICE

## 2019-10-28 RX ADMIN — LATANOPROST 1 DROP: 50 SOLUTION OPHTHALMIC at 21:09

## 2019-10-28 RX ADMIN — INSULIN LISPRO 7 UNITS: 100 INJECTION, SOLUTION INTRAVENOUS; SUBCUTANEOUS at 08:33

## 2019-10-28 RX ADMIN — GABAPENTIN 100 MG: 100 CAPSULE ORAL at 21:08

## 2019-10-28 RX ADMIN — INSULIN HUMAN 100 UNITS: 100 INJECTION, SOLUTION PARENTERAL at 12:16

## 2019-10-28 RX ADMIN — PANTOPRAZOLE SODIUM 40 MG: 40 TABLET, DELAYED RELEASE ORAL at 08:33

## 2019-10-28 RX ADMIN — GABAPENTIN 100 MG: 100 CAPSULE ORAL at 08:33

## 2019-10-28 RX ADMIN — LOSARTAN POTASSIUM 100 MG: 50 TABLET, FILM COATED ORAL at 08:33

## 2019-10-28 RX ADMIN — INSULIN HUMAN 100 UNITS: 100 INJECTION, SOLUTION PARENTERAL at 17:56

## 2019-10-28 RX ADMIN — OXYCODONE HYDROCHLORIDE AND ACETAMINOPHEN 0.5 TABLET: 10; 325 TABLET ORAL at 06:28

## 2019-10-28 RX ADMIN — OXYCODONE HYDROCHLORIDE AND ACETAMINOPHEN 1 TABLET: 7.5; 325 TABLET ORAL at 21:09

## 2019-10-28 RX ADMIN — ONDANSETRON HYDROCHLORIDE 4 MG: 2 SOLUTION INTRAMUSCULAR; INTRAVENOUS at 06:41

## 2019-10-28 RX ADMIN — OXYCODONE HYDROCHLORIDE AND ACETAMINOPHEN 1 TABLET: 7.5; 325 TABLET ORAL at 16:31

## 2019-10-28 RX ADMIN — SODIUM CHLORIDE, PRESERVATIVE FREE 10 ML: 5 INJECTION INTRAVENOUS at 08:34

## 2019-10-28 RX ADMIN — LACTULOSE 20 G: 20 SOLUTION ORAL at 21:09

## 2019-10-28 RX ADMIN — Medication 250 MG: at 08:33

## 2019-10-28 RX ADMIN — CEFTRIAXONE SODIUM 1 G: 1 INJECTION, POWDER, FOR SOLUTION INTRAMUSCULAR; INTRAVENOUS at 20:16

## 2019-10-28 RX ADMIN — LACTULOSE 20 G: 20 SOLUTION ORAL at 08:33

## 2019-10-28 RX ADMIN — OXYCODONE HYDROCHLORIDE AND ACETAMINOPHEN 0.5 TABLET: 10; 325 TABLET ORAL at 02:07

## 2019-10-28 RX ADMIN — OXYCODONE HYDROCHLORIDE AND ACETAMINOPHEN 1 TABLET: 7.5; 325 TABLET ORAL at 11:53

## 2019-10-28 RX ADMIN — VANCOMYCIN HYDROCHLORIDE 1500 MG: 10 INJECTION, POWDER, LYOPHILIZED, FOR SOLUTION INTRAVENOUS at 21:09

## 2019-10-28 RX ADMIN — DULOXETINE HYDROCHLORIDE 60 MG: 30 CAPSULE, DELAYED RELEASE ORAL at 08:33

## 2019-10-28 RX ADMIN — BUMETANIDE 1 MG/HR: 0.25 INJECTION INTRAMUSCULAR; INTRAVENOUS at 08:29

## 2019-10-28 RX ADMIN — Medication 250 MG: at 21:08

## 2019-10-28 RX ADMIN — ENOXAPARIN SODIUM 40 MG: 40 INJECTION SUBCUTANEOUS at 21:08

## 2019-10-28 RX ADMIN — INSULIN HUMAN 100 UNITS: 100 INJECTION, SOLUTION PARENTERAL at 08:33

## 2019-10-28 RX ADMIN — INSULIN LISPRO 8 UNITS: 100 INJECTION, SOLUTION INTRAVENOUS; SUBCUTANEOUS at 12:16

## 2019-10-28 RX ADMIN — ASPIRIN 81 MG: 81 TABLET ORAL at 08:33

## 2019-10-28 RX ADMIN — ROSUVASTATIN CALCIUM 40 MG: 20 TABLET, FILM COATED ORAL at 08:33

## 2019-10-28 RX ADMIN — INSULIN DETEMIR 20 UNITS: 100 INJECTION, SOLUTION SUBCUTANEOUS at 21:07

## 2019-10-29 ENCOUNTER — APPOINTMENT (OUTPATIENT)
Dept: ONCOLOGY | Facility: HOSPITAL | Age: 64
End: 2019-10-29

## 2019-10-29 ENCOUNTER — APPOINTMENT (OUTPATIENT)
Dept: CARDIOLOGY | Facility: HOSPITAL | Age: 64
End: 2019-10-29

## 2019-10-29 ENCOUNTER — APPOINTMENT (OUTPATIENT)
Dept: GENERAL RADIOLOGY | Facility: HOSPITAL | Age: 64
End: 2019-10-29

## 2019-10-29 LAB
25(OH)D3 SERPL-MCNC: 31 NG/ML (ref 30–100)
ALBUMIN SERPL-MCNC: 4 G/DL (ref 3.5–5.2)
ALBUMIN/GLOB SERPL: 1.3 G/DL
ALP SERPL-CCNC: 82 U/L (ref 39–117)
ALT SERPL W P-5'-P-CCNC: 15 U/L (ref 1–41)
ANION GAP SERPL CALCULATED.3IONS-SCNC: 17 MMOL/L (ref 5–15)
AST SERPL-CCNC: 18 U/L (ref 1–40)
BASOPHILS # BLD AUTO: 0.07 10*3/MM3 (ref 0–0.2)
BASOPHILS NFR BLD AUTO: 0.7 % (ref 0–1.5)
BH CV ECHO MEAS - AO MAX PG (FULL): 2.2 MMHG
BH CV ECHO MEAS - AO MAX PG: 6.1 MMHG
BH CV ECHO MEAS - AO MEAN PG (FULL): 1 MMHG
BH CV ECHO MEAS - AO MEAN PG: 3 MMHG
BH CV ECHO MEAS - AO ROOT AREA (BSA CORRECTED): 1.3
BH CV ECHO MEAS - AO ROOT AREA: 9.1 CM^2
BH CV ECHO MEAS - AO ROOT DIAM: 3.4 CM
BH CV ECHO MEAS - AO V2 MAX: 123 CM/SEC
BH CV ECHO MEAS - AO V2 MEAN: 83.5 CM/SEC
BH CV ECHO MEAS - AO V2 VTI: 26.4 CM
BH CV ECHO MEAS - AVA(I,A): 3.1 CM^2
BH CV ECHO MEAS - AVA(I,D): 3.1 CM^2
BH CV ECHO MEAS - AVA(V,A): 3.1 CM^2
BH CV ECHO MEAS - AVA(V,D): 3.1 CM^2
BH CV ECHO MEAS - BSA(HAYCOCK): 2.8 M^2
BH CV ECHO MEAS - BSA: 2.6 M^2
BH CV ECHO MEAS - BZI_BMI: 44.8 KILOGRAMS/M^2
BH CV ECHO MEAS - BZI_METRIC_HEIGHT: 182.9 CM
BH CV ECHO MEAS - BZI_METRIC_WEIGHT: 149.7 KG
BH CV ECHO MEAS - EDV(CUBED): 171.9 ML
BH CV ECHO MEAS - EDV(MOD-SP4): 117 ML
BH CV ECHO MEAS - EDV(TEICH): 151.2 ML
BH CV ECHO MEAS - EF(CUBED): 47 %
BH CV ECHO MEAS - EF(MOD-SP4): 60.8 %
BH CV ECHO MEAS - EF(TEICH): 38.8 %
BH CV ECHO MEAS - ESV(CUBED): 91.1 ML
BH CV ECHO MEAS - ESV(MOD-SP4): 45.9 ML
BH CV ECHO MEAS - ESV(TEICH): 92.4 ML
BH CV ECHO MEAS - FS: 19.1 %
BH CV ECHO MEAS - IVS/LVPW: 0.88
BH CV ECHO MEAS - IVSD: 1.1 CM
BH CV ECHO MEAS - LA DIMENSION: 4.5 CM
BH CV ECHO MEAS - LA/AO: 1.3
BH CV ECHO MEAS - LAT PEAK E' VEL: 8.6 CM/SEC
BH CV ECHO MEAS - LV DIASTOLIC VOL/BSA (35-75): 44.4 ML/M^2
BH CV ECHO MEAS - LV MASS(C)D: 277.2 GRAMS
BH CV ECHO MEAS - LV MASS(C)DI: 105.2 GRAMS/M^2
BH CV ECHO MEAS - LV MAX PG: 3.9 MMHG
BH CV ECHO MEAS - LV MEAN PG: 2 MMHG
BH CV ECHO MEAS - LV SYSTOLIC VOL/BSA (12-30): 17.4 ML/M^2
BH CV ECHO MEAS - LV V1 MAX: 98.7 CM/SEC
BH CV ECHO MEAS - LV V1 MEAN: 66.9 CM/SEC
BH CV ECHO MEAS - LV V1 VTI: 21.7 CM
BH CV ECHO MEAS - LVIDD: 5.6 CM
BH CV ECHO MEAS - LVIDS: 4.5 CM
BH CV ECHO MEAS - LVLD AP4: 8.6 CM
BH CV ECHO MEAS - LVLS AP4: 7.1 CM
BH CV ECHO MEAS - LVOT AREA (M): 3.8 CM^2
BH CV ECHO MEAS - LVOT AREA: 3.8 CM^2
BH CV ECHO MEAS - LVOT DIAM: 2.2 CM
BH CV ECHO MEAS - LVPWD: 1.3 CM
BH CV ECHO MEAS - MED PEAK E' VEL: 10.9 CM/SEC
BH CV ECHO MEAS - MV A MAX VEL: 116 CM/SEC
BH CV ECHO MEAS - MV DEC TIME: 0.31 SEC
BH CV ECHO MEAS - MV E MAX VEL: 125 CM/SEC
BH CV ECHO MEAS - MV E/A: 1.1
BH CV ECHO MEAS - SI(AO): 90.9 ML/M^2
BH CV ECHO MEAS - SI(CUBED): 30.6 ML/M^2
BH CV ECHO MEAS - SI(LVOT): 31.3 ML/M^2
BH CV ECHO MEAS - SI(MOD-SP4): 27 ML/M^2
BH CV ECHO MEAS - SI(TEICH): 22.3 ML/M^2
BH CV ECHO MEAS - SV(AO): 239.7 ML
BH CV ECHO MEAS - SV(CUBED): 80.8 ML
BH CV ECHO MEAS - SV(LVOT): 82.5 ML
BH CV ECHO MEAS - SV(MOD-SP4): 71.1 ML
BH CV ECHO MEAS - SV(TEICH): 58.7 ML
BH CV ECHO MEAS - TR MAX VEL: 118 CM/SEC
BH CV ECHO MEASUREMENTS AVERAGE E/E' RATIO: 12.82
BILIRUB SERPL-MCNC: 0.3 MG/DL (ref 0.2–1.2)
BUN BLD-MCNC: 40 MG/DL (ref 8–23)
BUN/CREAT SERPL: 18.5 (ref 7–25)
CALCIUM SPEC-SCNC: 9.3 MG/DL (ref 8.6–10.5)
CHLORIDE SERPL-SCNC: 95 MMOL/L (ref 98–107)
CO2 SERPL-SCNC: 27 MMOL/L (ref 22–29)
CREAT BLD-MCNC: 2.16 MG/DL (ref 0.76–1.27)
CREAT UR-MCNC: 59.3 MG/DL
DEPRECATED RDW RBC AUTO: 41.1 FL (ref 37–54)
EOSINOPHIL # BLD AUTO: 0.66 10*3/MM3 (ref 0–0.4)
EOSINOPHIL NFR BLD AUTO: 6.6 % (ref 0.3–6.2)
ERYTHROCYTE [DISTWIDTH] IN BLOOD BY AUTOMATED COUNT: 13.6 % (ref 12.3–15.4)
GFR SERPL CREATININE-BSD FRML MDRD: 31 ML/MIN/1.73
GLOBULIN UR ELPH-MCNC: 3.1 GM/DL
GLUCOSE BLD-MCNC: 109 MG/DL (ref 65–99)
GLUCOSE BLDC GLUCOMTR-MCNC: 225 MG/DL (ref 70–130)
GLUCOSE BLDC GLUCOMTR-MCNC: 250 MG/DL (ref 70–130)
GLUCOSE BLDC GLUCOMTR-MCNC: 284 MG/DL (ref 70–130)
GLUCOSE BLDC GLUCOMTR-MCNC: 313 MG/DL (ref 70–130)
HCT VFR BLD AUTO: 33.8 % (ref 37.5–51)
HGB BLD-MCNC: 11.4 G/DL (ref 13–17.7)
IMM GRANULOCYTES # BLD AUTO: 0.04 10*3/MM3 (ref 0–0.05)
IMM GRANULOCYTES NFR BLD AUTO: 0.4 % (ref 0–0.5)
LEFT ATRIUM VOLUME INDEX: 36.5 ML/M2
LEFT ATRIUM VOLUME: 96.4 CM3
LYMPHOCYTES # BLD AUTO: 1.34 10*3/MM3 (ref 0.7–3.1)
LYMPHOCYTES NFR BLD AUTO: 13.5 % (ref 19.6–45.3)
MAGNESIUM SERPL-MCNC: 2 MG/DL (ref 1.6–2.4)
MAXIMAL PREDICTED HEART RATE: 157 BPM
MCH RBC QN AUTO: 28.2 PG (ref 26.6–33)
MCHC RBC AUTO-ENTMCNC: 33.7 G/DL (ref 31.5–35.7)
MCV RBC AUTO: 83.7 FL (ref 79–97)
MONOCYTES # BLD AUTO: 0.99 10*3/MM3 (ref 0.1–0.9)
MONOCYTES NFR BLD AUTO: 10 % (ref 5–12)
NEUTROPHILS # BLD AUTO: 6.83 10*3/MM3 (ref 1.7–7)
NEUTROPHILS NFR BLD AUTO: 68.8 % (ref 42.7–76)
NRBC BLD AUTO-RTO: 0 /100 WBC (ref 0–0.2)
PHOSPHATE SERPL-MCNC: 5.7 MG/DL (ref 2.5–4.5)
PLATELET # BLD AUTO: 361 10*3/MM3 (ref 140–450)
PMV BLD AUTO: 10.1 FL (ref 6–12)
POTASSIUM BLD-SCNC: 4.1 MMOL/L (ref 3.5–5.2)
PROT SERPL-MCNC: 7.1 G/DL (ref 6–8.5)
PTH-INTACT SERPL-MCNC: 123.4 PG/ML (ref 15–65)
RBC # BLD AUTO: 4.04 10*6/MM3 (ref 4.14–5.8)
SODIUM BLD-SCNC: 139 MMOL/L (ref 136–145)
STRESS TARGET HR: 133 BPM
TSH SERPL DL<=0.05 MIU/L-ACNC: 1.49 UIU/ML (ref 0.27–4.2)
URATE SERPL-MCNC: 8 MG/DL (ref 3.4–7)
VANCOMYCIN SERPL-MCNC: 25.6 MCG/ML (ref 5–40)
WBC NRBC COR # BLD: 9.93 10*3/MM3 (ref 3.4–10.8)

## 2019-10-29 PROCEDURE — 94799 UNLISTED PULMONARY SVC/PX: CPT

## 2019-10-29 PROCEDURE — 25010000002 DIPHENHYDRAMINE PER 50 MG: Performed by: NURSE PRACTITIONER

## 2019-10-29 PROCEDURE — 25010000002 METHYLPREDNISOLONE PER 125 MG

## 2019-10-29 PROCEDURE — 84550 ASSAY OF BLOOD/URIC ACID: CPT | Performed by: INTERNAL MEDICINE

## 2019-10-29 PROCEDURE — 84443 ASSAY THYROID STIM HORMONE: CPT | Performed by: FAMILY MEDICINE

## 2019-10-29 PROCEDURE — 25010000002 METHYLNALTREXONE 12 MG/0.6ML SOLUTION: Performed by: INTERNAL MEDICINE

## 2019-10-29 PROCEDURE — 82962 GLUCOSE BLOOD TEST: CPT

## 2019-10-29 PROCEDURE — 97116 GAIT TRAINING THERAPY: CPT

## 2019-10-29 PROCEDURE — 84100 ASSAY OF PHOSPHORUS: CPT | Performed by: INTERNAL MEDICINE

## 2019-10-29 PROCEDURE — 80202 ASSAY OF VANCOMYCIN: CPT | Performed by: FAMILY MEDICINE

## 2019-10-29 PROCEDURE — 80053 COMPREHEN METABOLIC PANEL: CPT | Performed by: FAMILY MEDICINE

## 2019-10-29 PROCEDURE — 25010000002 CEFTRIAXONE PER 250 MG: Performed by: INTERNAL MEDICINE

## 2019-10-29 PROCEDURE — 63710000001 INSULIN DETEMIR PER 5 UNITS: Performed by: INTERNAL MEDICINE

## 2019-10-29 PROCEDURE — 63710000001 INSULIN REGULAR HUMAN PER 5 UNITS: Performed by: INTERNAL MEDICINE

## 2019-10-29 PROCEDURE — 93306 TTE W/DOPPLER COMPLETE: CPT

## 2019-10-29 PROCEDURE — 85025 COMPLETE CBC W/AUTO DIFF WBC: CPT | Performed by: FAMILY MEDICINE

## 2019-10-29 PROCEDURE — 83735 ASSAY OF MAGNESIUM: CPT | Performed by: INTERNAL MEDICINE

## 2019-10-29 PROCEDURE — 82306 VITAMIN D 25 HYDROXY: CPT | Performed by: INTERNAL MEDICINE

## 2019-10-29 PROCEDURE — 25010000002 VANCOMYCIN PER 500 MG: Performed by: FAMILY MEDICINE

## 2019-10-29 PROCEDURE — 93306 TTE W/DOPPLER COMPLETE: CPT | Performed by: INTERNAL MEDICINE

## 2019-10-29 PROCEDURE — 25010000002 ENOXAPARIN PER 10 MG: Performed by: FAMILY MEDICINE

## 2019-10-29 PROCEDURE — 25010000002 LORAZEPAM PER 2 MG: Performed by: INTERNAL MEDICINE

## 2019-10-29 PROCEDURE — 71045 X-RAY EXAM CHEST 1 VIEW: CPT

## 2019-10-29 PROCEDURE — 83970 ASSAY OF PARATHORMONE: CPT | Performed by: INTERNAL MEDICINE

## 2019-10-29 PROCEDURE — 25010000002 PERFLUTREN 6.52 MG/ML SUSPENSION: Performed by: FAMILY MEDICINE

## 2019-10-29 PROCEDURE — 63710000001 INSULIN LISPRO (HUMAN) PER 5 UNITS: Performed by: INTERNAL MEDICINE

## 2019-10-29 RX ORDER — LORAZEPAM 2 MG/ML
INJECTION INTRAMUSCULAR
Status: DISPENSED
Start: 2019-10-29 | End: 2019-10-30

## 2019-10-29 RX ORDER — BUMETANIDE 0.25 MG/ML
1 INJECTION INTRAMUSCULAR; INTRAVENOUS EVERY 12 HOURS
Status: DISCONTINUED | OUTPATIENT
Start: 2019-10-30 | End: 2019-10-31

## 2019-10-29 RX ORDER — METHYLPREDNISOLONE SODIUM SUCCINATE 125 MG/2ML
60 INJECTION, POWDER, LYOPHILIZED, FOR SOLUTION INTRAMUSCULAR; INTRAVENOUS ONCE
Status: COMPLETED | OUTPATIENT
Start: 2019-10-30 | End: 2019-10-29

## 2019-10-29 RX ORDER — METHYLPREDNISOLONE SODIUM SUCCINATE 125 MG/2ML
INJECTION, POWDER, LYOPHILIZED, FOR SOLUTION INTRAMUSCULAR; INTRAVENOUS
Status: COMPLETED
Start: 2019-10-29 | End: 2019-10-29

## 2019-10-29 RX ORDER — LORAZEPAM 2 MG/ML
0.5 INJECTION INTRAMUSCULAR EVERY 4 HOURS PRN
Status: COMPLETED | OUTPATIENT
Start: 2019-10-29 | End: 2019-10-29

## 2019-10-29 RX ORDER — DIPHENHYDRAMINE HYDROCHLORIDE 50 MG/ML
25 INJECTION INTRAMUSCULAR; INTRAVENOUS EVERY 6 HOURS PRN
Status: DISCONTINUED | OUTPATIENT
Start: 2019-10-29 | End: 2019-11-06 | Stop reason: HOSPADM

## 2019-10-29 RX ORDER — VANCOMYCIN HYDROCHLORIDE 1 G/200ML
1000 INJECTION, SOLUTION INTRAVENOUS EVERY 24 HOURS
Status: DISCONTINUED | OUTPATIENT
Start: 2019-10-29 | End: 2019-10-30

## 2019-10-29 RX ORDER — FAMOTIDINE 10 MG/ML
20 INJECTION, SOLUTION INTRAVENOUS ONCE
Status: COMPLETED | OUTPATIENT
Start: 2019-10-30 | End: 2019-10-29

## 2019-10-29 RX ADMIN — GABAPENTIN 100 MG: 100 CAPSULE ORAL at 09:25

## 2019-10-29 RX ADMIN — ENOXAPARIN SODIUM 30 MG: 30 INJECTION SUBCUTANEOUS at 21:22

## 2019-10-29 RX ADMIN — SODIUM CHLORIDE, PRESERVATIVE FREE 10 ML: 5 INJECTION INTRAVENOUS at 21:21

## 2019-10-29 RX ADMIN — LACTULOSE 20 G: 20 SOLUTION ORAL at 09:25

## 2019-10-29 RX ADMIN — LATANOPROST 1 DROP: 50 SOLUTION OPHTHALMIC at 21:21

## 2019-10-29 RX ADMIN — Medication 250 MG: at 09:25

## 2019-10-29 RX ADMIN — FAMOTIDINE 20 MG: 10 INJECTION, SOLUTION INTRAVENOUS at 23:22

## 2019-10-29 RX ADMIN — INSULIN LISPRO 7 UNITS: 100 INJECTION, SOLUTION INTRAVENOUS; SUBCUTANEOUS at 12:39

## 2019-10-29 RX ADMIN — Medication 250 MG: at 21:20

## 2019-10-29 RX ADMIN — OXYCODONE HYDROCHLORIDE AND ACETAMINOPHEN 1 TABLET: 7.5; 325 TABLET ORAL at 11:51

## 2019-10-29 RX ADMIN — BUMETANIDE 0.5 MG/HR: 0.25 INJECTION INTRAMUSCULAR; INTRAVENOUS at 04:20

## 2019-10-29 RX ADMIN — ASPIRIN 81 MG: 81 TABLET ORAL at 09:25

## 2019-10-29 RX ADMIN — INSULIN LISPRO 6 UNITS: 100 INJECTION, SOLUTION INTRAVENOUS; SUBCUTANEOUS at 09:26

## 2019-10-29 RX ADMIN — INSULIN LISPRO 4 UNITS: 100 INJECTION, SOLUTION INTRAVENOUS; SUBCUTANEOUS at 21:21

## 2019-10-29 RX ADMIN — METHYLNALTREXONE BROMIDE 8 MG: 12 INJECTION, SOLUTION SUBCUTANEOUS at 09:25

## 2019-10-29 RX ADMIN — LORAZEPAM 0.5 MG: 2 INJECTION INTRAMUSCULAR; INTRAVENOUS at 23:15

## 2019-10-29 RX ADMIN — OXYCODONE HYDROCHLORIDE AND ACETAMINOPHEN 1 TABLET: 7.5; 325 TABLET ORAL at 02:00

## 2019-10-29 RX ADMIN — OXYCODONE HYDROCHLORIDE AND ACETAMINOPHEN 1 TABLET: 7.5; 325 TABLET ORAL at 06:01

## 2019-10-29 RX ADMIN — OXYCODONE HYDROCHLORIDE AND ACETAMINOPHEN 1 TABLET: 7.5; 325 TABLET ORAL at 16:40

## 2019-10-29 RX ADMIN — INSULIN HUMAN 100 UNITS: 100 INJECTION, SOLUTION PARENTERAL at 09:26

## 2019-10-29 RX ADMIN — VANCOMYCIN HYDROCHLORIDE 1000 MG: 1 INJECTION, SOLUTION INTRAVENOUS at 21:22

## 2019-10-29 RX ADMIN — PANTOPRAZOLE SODIUM 40 MG: 40 TABLET, DELAYED RELEASE ORAL at 09:25

## 2019-10-29 RX ADMIN — OXYCODONE HYDROCHLORIDE AND ACETAMINOPHEN 1 TABLET: 7.5; 325 TABLET ORAL at 21:21

## 2019-10-29 RX ADMIN — INSULIN HUMAN 100 UNITS: 100 INJECTION, SOLUTION PARENTERAL at 17:32

## 2019-10-29 RX ADMIN — LOSARTAN POTASSIUM 100 MG: 50 TABLET, FILM COATED ORAL at 09:25

## 2019-10-29 RX ADMIN — PERFLUTREN: 6.52 INJECTION, SUSPENSION INTRAVENOUS at 08:36

## 2019-10-29 RX ADMIN — INSULIN HUMAN 100 UNITS: 100 INJECTION, SOLUTION PARENTERAL at 12:39

## 2019-10-29 RX ADMIN — CEFTRIAXONE SODIUM 1 G: 1 INJECTION, POWDER, FOR SOLUTION INTRAMUSCULAR; INTRAVENOUS at 18:27

## 2019-10-29 RX ADMIN — DULOXETINE HYDROCHLORIDE 60 MG: 30 CAPSULE, DELAYED RELEASE ORAL at 09:25

## 2019-10-29 RX ADMIN — METHYLPREDNISOLONE SODIUM SUCCINATE 60 MG: 125 INJECTION, POWDER, LYOPHILIZED, FOR SOLUTION INTRAMUSCULAR; INTRAVENOUS at 23:16

## 2019-10-29 RX ADMIN — METHYLPREDNISOLONE SODIUM SUCCINATE 60 MG: 125 INJECTION, POWDER, FOR SOLUTION INTRAMUSCULAR; INTRAVENOUS at 23:16

## 2019-10-29 RX ADMIN — LACTULOSE 20 G: 20 SOLUTION ORAL at 21:22

## 2019-10-29 RX ADMIN — ROSUVASTATIN CALCIUM 40 MG: 20 TABLET, FILM COATED ORAL at 09:25

## 2019-10-29 RX ADMIN — GABAPENTIN 100 MG: 100 CAPSULE ORAL at 21:20

## 2019-10-29 RX ADMIN — INSULIN LISPRO 6 UNITS: 100 INJECTION, SOLUTION INTRAVENOUS; SUBCUTANEOUS at 17:32

## 2019-10-29 RX ADMIN — DIPHENHYDRAMINE HYDROCHLORIDE 25 MG: 50 INJECTION, SOLUTION INTRAMUSCULAR; INTRAVENOUS at 23:36

## 2019-10-29 RX ADMIN — INSULIN DETEMIR 20 UNITS: 100 INJECTION, SOLUTION SUBCUTANEOUS at 21:20

## 2019-10-30 ENCOUNTER — APPOINTMENT (OUTPATIENT)
Dept: ULTRASOUND IMAGING | Facility: HOSPITAL | Age: 64
End: 2019-10-30

## 2019-10-30 ENCOUNTER — APPOINTMENT (OUTPATIENT)
Dept: NUCLEAR MEDICINE | Facility: HOSPITAL | Age: 64
End: 2019-10-30

## 2019-10-30 LAB
ALBUMIN SERPL-MCNC: 4 G/DL (ref 3.5–5.2)
ALBUMIN/GLOB SERPL: 1.3 G/DL
ALP SERPL-CCNC: 83 U/L (ref 39–117)
ALT SERPL W P-5'-P-CCNC: 15 U/L (ref 1–41)
ANION GAP SERPL CALCULATED.3IONS-SCNC: 16 MMOL/L (ref 5–15)
AST SERPL-CCNC: 17 U/L (ref 1–40)
BASOPHILS # BLD AUTO: 0.03 10*3/MM3 (ref 0–0.2)
BASOPHILS NFR BLD AUTO: 0.3 % (ref 0–1.5)
BILIRUB SERPL-MCNC: 0.3 MG/DL (ref 0.2–1.2)
BUN BLD-MCNC: 47 MG/DL (ref 8–23)
BUN/CREAT SERPL: 22.9 (ref 7–25)
CALCIUM SPEC-SCNC: 9.1 MG/DL (ref 8.6–10.5)
CHLORIDE SERPL-SCNC: 94 MMOL/L (ref 98–107)
CO2 SERPL-SCNC: 26 MMOL/L (ref 22–29)
CREAT BLD-MCNC: 2.05 MG/DL (ref 0.76–1.27)
DEPRECATED RDW RBC AUTO: 40.9 FL (ref 37–54)
EOSINOPHIL # BLD AUTO: 0.02 10*3/MM3 (ref 0–0.4)
EOSINOPHIL NFR BLD AUTO: 0.2 % (ref 0.3–6.2)
ERYTHROCYTE [DISTWIDTH] IN BLOOD BY AUTOMATED COUNT: 13.3 % (ref 12.3–15.4)
GFR SERPL CREATININE-BSD FRML MDRD: 33 ML/MIN/1.73
GLOBULIN UR ELPH-MCNC: 3.2 GM/DL
GLUCOSE BLD-MCNC: 207 MG/DL (ref 65–99)
GLUCOSE BLDC GLUCOMTR-MCNC: 267 MG/DL (ref 70–130)
GLUCOSE BLDC GLUCOMTR-MCNC: 307 MG/DL (ref 70–130)
GLUCOSE BLDC GLUCOMTR-MCNC: 424 MG/DL (ref 70–130)
GLUCOSE BLDC GLUCOMTR-MCNC: 428 MG/DL (ref 70–130)
GLUCOSE BLDC GLUCOMTR-MCNC: 428 MG/DL (ref 70–130)
GLUCOSE BLDC GLUCOMTR-MCNC: 464 MG/DL (ref 70–130)
HCT VFR BLD AUTO: 33.5 % (ref 37.5–51)
HGB BLD-MCNC: 11.4 G/DL (ref 13–17.7)
IMM GRANULOCYTES # BLD AUTO: 0.06 10*3/MM3 (ref 0–0.05)
IMM GRANULOCYTES NFR BLD AUTO: 0.5 % (ref 0–0.5)
LYMPHOCYTES # BLD AUTO: 0.46 10*3/MM3 (ref 0.7–3.1)
LYMPHOCYTES NFR BLD AUTO: 4.1 % (ref 19.6–45.3)
MCH RBC QN AUTO: 28.4 PG (ref 26.6–33)
MCHC RBC AUTO-ENTMCNC: 34 G/DL (ref 31.5–35.7)
MCV RBC AUTO: 83.5 FL (ref 79–97)
MONOCYTES # BLD AUTO: 0.13 10*3/MM3 (ref 0.1–0.9)
MONOCYTES NFR BLD AUTO: 1.2 % (ref 5–12)
NEUTROPHILS # BLD AUTO: 10.46 10*3/MM3 (ref 1.7–7)
NEUTROPHILS NFR BLD AUTO: 93.7 % (ref 42.7–76)
NRBC BLD AUTO-RTO: 0 /100 WBC (ref 0–0.2)
PLATELET # BLD AUTO: 356 10*3/MM3 (ref 140–450)
PMV BLD AUTO: 10.8 FL (ref 6–12)
POTASSIUM BLD-SCNC: 4.5 MMOL/L (ref 3.5–5.2)
PROT SERPL-MCNC: 7.2 G/DL (ref 6–8.5)
RBC # BLD AUTO: 4.01 10*6/MM3 (ref 4.14–5.8)
SODIUM BLD-SCNC: 136 MMOL/L (ref 136–145)
WBC NRBC COR # BLD: 11.16 10*3/MM3 (ref 3.4–10.8)

## 2019-10-30 PROCEDURE — 97110 THERAPEUTIC EXERCISES: CPT

## 2019-10-30 PROCEDURE — 63710000001 INSULIN LISPRO (HUMAN) PER 5 UNITS: Performed by: FAMILY MEDICINE

## 2019-10-30 PROCEDURE — 63710000001 INSULIN REGULAR HUMAN PER 5 UNITS: Performed by: INTERNAL MEDICINE

## 2019-10-30 PROCEDURE — 78582 LUNG VENTILAT&PERFUS IMAGING: CPT

## 2019-10-30 PROCEDURE — 82962 GLUCOSE BLOOD TEST: CPT

## 2019-10-30 PROCEDURE — 85025 COMPLETE CBC W/AUTO DIFF WBC: CPT | Performed by: FAMILY MEDICINE

## 2019-10-30 PROCEDURE — 93971 EXTREMITY STUDY: CPT | Performed by: SURGERY

## 2019-10-30 PROCEDURE — 0 XENON XE 133: Performed by: FAMILY MEDICINE

## 2019-10-30 PROCEDURE — 93971 EXTREMITY STUDY: CPT

## 2019-10-30 PROCEDURE — 97116 GAIT TRAINING THERAPY: CPT

## 2019-10-30 PROCEDURE — A9540 TC99M MAA: HCPCS | Performed by: FAMILY MEDICINE

## 2019-10-30 PROCEDURE — 63710000001 INSULIN DETEMIR PER 5 UNITS: Performed by: INTERNAL MEDICINE

## 2019-10-30 PROCEDURE — 25010000002 ONDANSETRON PER 1 MG: Performed by: INTERNAL MEDICINE

## 2019-10-30 PROCEDURE — 0 TECHNETIUM ALBUMIN AGGREGATED: Performed by: FAMILY MEDICINE

## 2019-10-30 PROCEDURE — 25010000002 ENOXAPARIN PER 10 MG: Performed by: FAMILY MEDICINE

## 2019-10-30 PROCEDURE — 63710000001 INSULIN LISPRO (HUMAN) PER 5 UNITS: Performed by: INTERNAL MEDICINE

## 2019-10-30 PROCEDURE — A9558 XE133 XENON 10MCI: HCPCS | Performed by: FAMILY MEDICINE

## 2019-10-30 PROCEDURE — 80053 COMPREHEN METABOLIC PANEL: CPT | Performed by: FAMILY MEDICINE

## 2019-10-30 RX ORDER — NICOTINE POLACRILEX 4 MG
15 LOZENGE BUCCAL
Status: DISCONTINUED | OUTPATIENT
Start: 2019-10-30 | End: 2019-10-30 | Stop reason: SDUPTHER

## 2019-10-30 RX ORDER — CLINDAMYCIN PHOSPHATE 900 MG/50ML
900 INJECTION INTRAVENOUS EVERY 8 HOURS
Status: DISCONTINUED | OUTPATIENT
Start: 2019-10-30 | End: 2019-11-06 | Stop reason: HOSPADM

## 2019-10-30 RX ORDER — DEXTROSE MONOHYDRATE 25 G/50ML
25 INJECTION, SOLUTION INTRAVENOUS
Status: DISCONTINUED | OUTPATIENT
Start: 2019-10-30 | End: 2019-10-30 | Stop reason: SDUPTHER

## 2019-10-30 RX ADMIN — ENOXAPARIN SODIUM 30 MG: 30 INJECTION SUBCUTANEOUS at 20:48

## 2019-10-30 RX ADMIN — OXYCODONE HYDROCHLORIDE AND ACETAMINOPHEN 1 TABLET: 7.5; 325 TABLET ORAL at 09:34

## 2019-10-30 RX ADMIN — PANTOPRAZOLE SODIUM 40 MG: 40 TABLET, DELAYED RELEASE ORAL at 09:11

## 2019-10-30 RX ADMIN — OXYCODONE HYDROCHLORIDE AND ACETAMINOPHEN 1 TABLET: 7.5; 325 TABLET ORAL at 04:44

## 2019-10-30 RX ADMIN — BUMETANIDE 1 MG: 0.25 INJECTION INTRAMUSCULAR; INTRAVENOUS at 09:10

## 2019-10-30 RX ADMIN — ASPIRIN 81 MG: 81 TABLET ORAL at 09:11

## 2019-10-30 RX ADMIN — Medication 250 MG: at 09:11

## 2019-10-30 RX ADMIN — ONDANSETRON HYDROCHLORIDE 4 MG: 2 SOLUTION INTRAMUSCULAR; INTRAVENOUS at 15:54

## 2019-10-30 RX ADMIN — LACTULOSE 20 G: 20 SOLUTION ORAL at 09:10

## 2019-10-30 RX ADMIN — LOSARTAN POTASSIUM 100 MG: 50 TABLET, FILM COATED ORAL at 09:34

## 2019-10-30 RX ADMIN — INSULIN LISPRO 12 UNITS: 100 INJECTION, SOLUTION INTRAVENOUS; SUBCUTANEOUS at 20:49

## 2019-10-30 RX ADMIN — INSULIN LISPRO 9 UNITS: 100 INJECTION, SOLUTION INTRAVENOUS; SUBCUTANEOUS at 12:26

## 2019-10-30 RX ADMIN — INSULIN HUMAN 100 UNITS: 100 INJECTION, SOLUTION PARENTERAL at 09:11

## 2019-10-30 RX ADMIN — INSULIN DETEMIR 20 UNITS: 100 INJECTION, SOLUTION SUBCUTANEOUS at 20:49

## 2019-10-30 RX ADMIN — GABAPENTIN 100 MG: 100 CAPSULE ORAL at 09:11

## 2019-10-30 RX ADMIN — INSULIN HUMAN 100 UNITS: 100 INJECTION, SOLUTION PARENTERAL at 17:31

## 2019-10-30 RX ADMIN — INSULIN LISPRO 7 UNITS: 100 INJECTION, SOLUTION INTRAVENOUS; SUBCUTANEOUS at 09:12

## 2019-10-30 RX ADMIN — INSULIN HUMAN 100 UNITS: 100 INJECTION, SOLUTION PARENTERAL at 11:45

## 2019-10-30 RX ADMIN — GABAPENTIN 100 MG: 100 CAPSULE ORAL at 20:48

## 2019-10-30 RX ADMIN — CLINDAMYCIN IN 5 PERCENT DEXTROSE 900 MG: 18 INJECTION, SOLUTION INTRAVENOUS at 20:49

## 2019-10-30 RX ADMIN — INSULIN LISPRO 24 UNITS: 100 INJECTION, SOLUTION INTRAVENOUS; SUBCUTANEOUS at 17:31

## 2019-10-30 RX ADMIN — SODIUM CHLORIDE, PRESERVATIVE FREE 10 ML: 5 INJECTION INTRAVENOUS at 20:48

## 2019-10-30 RX ADMIN — OXYCODONE HYDROCHLORIDE AND ACETAMINOPHEN 1 TABLET: 7.5; 325 TABLET ORAL at 20:48

## 2019-10-30 RX ADMIN — DULOXETINE HYDROCHLORIDE 60 MG: 30 CAPSULE, DELAYED RELEASE ORAL at 09:11

## 2019-10-30 RX ADMIN — ROSUVASTATIN CALCIUM 40 MG: 20 TABLET, FILM COATED ORAL at 09:11

## 2019-10-30 RX ADMIN — XENON XE-133 9.5 MILLICURIE: 10 GAS RESPIRATORY (INHALATION) at 08:45

## 2019-10-30 RX ADMIN — BUMETANIDE 1 MG: 0.25 INJECTION INTRAMUSCULAR; INTRAVENOUS at 20:48

## 2019-10-30 RX ADMIN — LACTULOSE 20 G: 20 SOLUTION ORAL at 20:48

## 2019-10-30 RX ADMIN — SODIUM CHLORIDE, PRESERVATIVE FREE 10 ML: 5 INJECTION INTRAVENOUS at 09:12

## 2019-10-30 RX ADMIN — LATANOPROST 1 DROP: 50 SOLUTION OPHTHALMIC at 20:50

## 2019-10-30 RX ADMIN — Medication 250 MG: at 20:48

## 2019-10-30 RX ADMIN — Medication 1 DOSE: at 08:45

## 2019-10-30 RX ADMIN — OXYCODONE HYDROCHLORIDE AND ACETAMINOPHEN 1 TABLET: 7.5; 325 TABLET ORAL at 15:18

## 2019-10-31 ENCOUNTER — APPOINTMENT (OUTPATIENT)
Dept: GENERAL RADIOLOGY | Facility: HOSPITAL | Age: 64
End: 2019-10-31

## 2019-10-31 LAB
ALBUMIN SERPL-MCNC: 4 G/DL (ref 3.5–5.2)
ALBUMIN/GLOB SERPL: 1.3 G/DL
ALP SERPL-CCNC: 82 U/L (ref 39–117)
ALT SERPL W P-5'-P-CCNC: 16 U/L (ref 1–41)
ANION GAP SERPL CALCULATED.3IONS-SCNC: 15 MMOL/L (ref 5–15)
AST SERPL-CCNC: 15 U/L (ref 1–40)
BASOPHILS # BLD AUTO: 0.08 10*3/MM3 (ref 0–0.2)
BASOPHILS NFR BLD AUTO: 0.8 % (ref 0–1.5)
BILIRUB SERPL-MCNC: 0.3 MG/DL (ref 0.2–1.2)
BUN BLD-MCNC: 58 MG/DL (ref 8–23)
BUN/CREAT SERPL: 27.2 (ref 7–25)
CALCIUM SPEC-SCNC: 9.2 MG/DL (ref 8.6–10.5)
CHLORIDE SERPL-SCNC: 93 MMOL/L (ref 98–107)
CO2 SERPL-SCNC: 29 MMOL/L (ref 22–29)
CREAT BLD-MCNC: 2.13 MG/DL (ref 0.76–1.27)
DEPRECATED RDW RBC AUTO: 41.9 FL (ref 37–54)
EOSINOPHIL # BLD AUTO: 0.42 10*3/MM3 (ref 0–0.4)
EOSINOPHIL NFR BLD AUTO: 4 % (ref 0.3–6.2)
ERYTHROCYTE [DISTWIDTH] IN BLOOD BY AUTOMATED COUNT: 13.6 % (ref 12.3–15.4)
GFR SERPL CREATININE-BSD FRML MDRD: 32 ML/MIN/1.73
GLOBULIN UR ELPH-MCNC: 3.2 GM/DL
GLUCOSE BLD-MCNC: 104 MG/DL (ref 65–99)
GLUCOSE BLDC GLUCOMTR-MCNC: 121 MG/DL (ref 70–130)
GLUCOSE BLDC GLUCOMTR-MCNC: 140 MG/DL (ref 70–130)
GLUCOSE BLDC GLUCOMTR-MCNC: 223 MG/DL (ref 70–130)
GLUCOSE BLDC GLUCOMTR-MCNC: 63 MG/DL (ref 70–130)
GLUCOSE BLDC GLUCOMTR-MCNC: 89 MG/DL (ref 70–130)
HCT VFR BLD AUTO: 33.7 % (ref 37.5–51)
HGB BLD-MCNC: 11.3 G/DL (ref 13–17.7)
IMM GRANULOCYTES # BLD AUTO: 0.06 10*3/MM3 (ref 0–0.05)
IMM GRANULOCYTES NFR BLD AUTO: 0.6 % (ref 0–0.5)
LYMPHOCYTES # BLD AUTO: 2.04 10*3/MM3 (ref 0.7–3.1)
LYMPHOCYTES NFR BLD AUTO: 19.4 % (ref 19.6–45.3)
MCH RBC QN AUTO: 28.3 PG (ref 26.6–33)
MCHC RBC AUTO-ENTMCNC: 33.5 G/DL (ref 31.5–35.7)
MCV RBC AUTO: 84.5 FL (ref 79–97)
MONOCYTES # BLD AUTO: 1.22 10*3/MM3 (ref 0.1–0.9)
MONOCYTES NFR BLD AUTO: 11.6 % (ref 5–12)
NEUTROPHILS # BLD AUTO: 6.71 10*3/MM3 (ref 1.7–7)
NEUTROPHILS NFR BLD AUTO: 63.6 % (ref 42.7–76)
NRBC BLD AUTO-RTO: 0 /100 WBC (ref 0–0.2)
PLATELET # BLD AUTO: 397 10*3/MM3 (ref 140–450)
PMV BLD AUTO: 10.7 FL (ref 6–12)
POTASSIUM BLD-SCNC: 4.2 MMOL/L (ref 3.5–5.2)
PROT SERPL-MCNC: 7.2 G/DL (ref 6–8.5)
RBC # BLD AUTO: 3.99 10*6/MM3 (ref 4.14–5.8)
SODIUM BLD-SCNC: 137 MMOL/L (ref 136–145)
WBC NRBC COR # BLD: 10.53 10*3/MM3 (ref 3.4–10.8)

## 2019-10-31 PROCEDURE — 25010000002 ONDANSETRON PER 1 MG: Performed by: INTERNAL MEDICINE

## 2019-10-31 PROCEDURE — 63710000001 INSULIN LISPRO (HUMAN) PER 5 UNITS: Performed by: FAMILY MEDICINE

## 2019-10-31 PROCEDURE — 63710000001 INSULIN REGULAR HUMAN PER 5 UNITS: Performed by: INTERNAL MEDICINE

## 2019-10-31 PROCEDURE — 25010000002 METHYLNALTREXONE 12 MG/0.6ML SOLUTION: Performed by: INTERNAL MEDICINE

## 2019-10-31 PROCEDURE — 97530 THERAPEUTIC ACTIVITIES: CPT

## 2019-10-31 PROCEDURE — 80053 COMPREHEN METABOLIC PANEL: CPT | Performed by: FAMILY MEDICINE

## 2019-10-31 PROCEDURE — 74018 RADEX ABDOMEN 1 VIEW: CPT

## 2019-10-31 PROCEDURE — 25010000002 ENOXAPARIN PER 10 MG: Performed by: FAMILY MEDICINE

## 2019-10-31 PROCEDURE — 97116 GAIT TRAINING THERAPY: CPT

## 2019-10-31 PROCEDURE — 85025 COMPLETE CBC W/AUTO DIFF WBC: CPT | Performed by: FAMILY MEDICINE

## 2019-10-31 PROCEDURE — 82962 GLUCOSE BLOOD TEST: CPT

## 2019-10-31 PROCEDURE — 99232 SBSQ HOSP IP/OBS MODERATE 35: CPT | Performed by: PODIATRIST

## 2019-10-31 RX ORDER — BISACODYL 10 MG
10 SUPPOSITORY, RECTAL RECTAL DAILY PRN
Status: DISCONTINUED | OUTPATIENT
Start: 2019-10-31 | End: 2019-11-06 | Stop reason: HOSPADM

## 2019-10-31 RX ORDER — NYSTATIN 100000 [USP'U]/G
POWDER TOPICAL EVERY 12 HOURS SCHEDULED
Status: DISCONTINUED | OUTPATIENT
Start: 2019-10-31 | End: 2019-11-06 | Stop reason: HOSPADM

## 2019-10-31 RX ORDER — PROMETHAZINE HYDROCHLORIDE 25 MG/ML
12.5 INJECTION, SOLUTION INTRAMUSCULAR; INTRAVENOUS EVERY 6 HOURS PRN
Status: DISCONTINUED | OUTPATIENT
Start: 2019-10-31 | End: 2019-11-02

## 2019-10-31 RX ORDER — BUMETANIDE 0.25 MG/ML
1 INJECTION INTRAMUSCULAR; INTRAVENOUS DAILY
Status: DISCONTINUED | OUTPATIENT
Start: 2019-11-01 | End: 2019-11-04

## 2019-10-31 RX ORDER — HYDROMORPHONE HYDROCHLORIDE 1 MG/ML
0.5 INJECTION, SOLUTION INTRAMUSCULAR; INTRAVENOUS; SUBCUTANEOUS ONCE
Status: COMPLETED | OUTPATIENT
Start: 2019-10-31 | End: 2019-11-01

## 2019-10-31 RX ADMIN — LACTULOSE 20 G: 20 SOLUTION ORAL at 09:48

## 2019-10-31 RX ADMIN — OXYCODONE HYDROCHLORIDE AND ACETAMINOPHEN 1 TABLET: 7.5; 325 TABLET ORAL at 17:08

## 2019-10-31 RX ADMIN — CLINDAMYCIN IN 5 PERCENT DEXTROSE 900 MG: 18 INJECTION, SOLUTION INTRAVENOUS at 03:33

## 2019-10-31 RX ADMIN — ENOXAPARIN SODIUM 30 MG: 30 INJECTION SUBCUTANEOUS at 21:28

## 2019-10-31 RX ADMIN — METHYLNALTREXONE BROMIDE 8 MG: 12 INJECTION, SOLUTION SUBCUTANEOUS at 09:48

## 2019-10-31 RX ADMIN — PANTOPRAZOLE SODIUM 40 MG: 40 TABLET, DELAYED RELEASE ORAL at 09:47

## 2019-10-31 RX ADMIN — ONDANSETRON HYDROCHLORIDE 4 MG: 2 SOLUTION INTRAMUSCULAR; INTRAVENOUS at 17:19

## 2019-10-31 RX ADMIN — OXYCODONE HYDROCHLORIDE AND ACETAMINOPHEN 1 TABLET: 7.5; 325 TABLET ORAL at 03:33

## 2019-10-31 RX ADMIN — INSULIN HUMAN 100 UNITS: 100 INJECTION, SOLUTION PARENTERAL at 09:48

## 2019-10-31 RX ADMIN — INSULIN HUMAN 100 UNITS: 100 INJECTION, SOLUTION PARENTERAL at 12:43

## 2019-10-31 RX ADMIN — INSULIN LISPRO 8 UNITS: 100 INJECTION, SOLUTION INTRAVENOUS; SUBCUTANEOUS at 12:43

## 2019-10-31 RX ADMIN — OXYCODONE HYDROCHLORIDE AND ACETAMINOPHEN 1 TABLET: 7.5; 325 TABLET ORAL at 09:48

## 2019-10-31 RX ADMIN — ASPIRIN 81 MG: 81 TABLET ORAL at 09:47

## 2019-10-31 RX ADMIN — Medication 250 MG: at 09:47

## 2019-10-31 RX ADMIN — INSULIN HUMAN 100 UNITS: 100 INJECTION, SOLUTION PARENTERAL at 17:19

## 2019-10-31 RX ADMIN — DULOXETINE HYDROCHLORIDE 60 MG: 30 CAPSULE, DELAYED RELEASE ORAL at 09:47

## 2019-10-31 RX ADMIN — SODIUM CHLORIDE, PRESERVATIVE FREE 10 ML: 5 INJECTION INTRAVENOUS at 09:47

## 2019-10-31 RX ADMIN — LACTULOSE 20 G: 20 SOLUTION ORAL at 21:29

## 2019-10-31 RX ADMIN — BUMETANIDE 1 MG: 0.25 INJECTION INTRAMUSCULAR; INTRAVENOUS at 06:18

## 2019-10-31 RX ADMIN — CLINDAMYCIN IN 5 PERCENT DEXTROSE 900 MG: 18 INJECTION, SOLUTION INTRAVENOUS at 12:43

## 2019-10-31 RX ADMIN — OXYCODONE HYDROCHLORIDE AND ACETAMINOPHEN 1 TABLET: 7.5; 325 TABLET ORAL at 22:12

## 2019-10-31 RX ADMIN — LATANOPROST 1 DROP: 50 SOLUTION OPHTHALMIC at 21:29

## 2019-10-31 RX ADMIN — GABAPENTIN 100 MG: 100 CAPSULE ORAL at 21:28

## 2019-10-31 RX ADMIN — ONDANSETRON HYDROCHLORIDE 4 MG: 2 SOLUTION INTRAMUSCULAR; INTRAVENOUS at 09:47

## 2019-10-31 RX ADMIN — GABAPENTIN 100 MG: 100 CAPSULE ORAL at 09:48

## 2019-10-31 RX ADMIN — LOSARTAN POTASSIUM 100 MG: 50 TABLET, FILM COATED ORAL at 09:47

## 2019-10-31 RX ADMIN — ROSUVASTATIN CALCIUM 40 MG: 20 TABLET, FILM COATED ORAL at 09:47

## 2019-10-31 RX ADMIN — Medication 250 MG: at 21:28

## 2019-10-31 RX ADMIN — CLINDAMYCIN IN 5 PERCENT DEXTROSE 900 MG: 18 INJECTION, SOLUTION INTRAVENOUS at 21:08

## 2019-11-01 LAB
ALBUMIN SERPL-MCNC: 3.9 G/DL (ref 3.5–5.2)
ALBUMIN/GLOB SERPL: 1.3 G/DL
ALP SERPL-CCNC: 68 U/L (ref 39–117)
ALT SERPL W P-5'-P-CCNC: 16 U/L (ref 1–41)
ANION GAP SERPL CALCULATED.3IONS-SCNC: 13 MMOL/L (ref 5–15)
AST SERPL-CCNC: 19 U/L (ref 1–40)
BACTERIA SPEC AEROBE CULT: NORMAL
BACTERIA SPEC AEROBE CULT: NORMAL
BASOPHILS # BLD AUTO: 0.04 10*3/MM3 (ref 0–0.2)
BASOPHILS NFR BLD AUTO: 0.4 % (ref 0–1.5)
BILIRUB SERPL-MCNC: 0.3 MG/DL (ref 0.2–1.2)
BUN BLD-MCNC: 57 MG/DL (ref 8–23)
BUN/CREAT SERPL: 28.2 (ref 7–25)
CALCIUM SPEC-SCNC: 8.7 MG/DL (ref 8.6–10.5)
CHLORIDE SERPL-SCNC: 94 MMOL/L (ref 98–107)
CO2 SERPL-SCNC: 29 MMOL/L (ref 22–29)
CREAT BLD-MCNC: 2.02 MG/DL (ref 0.76–1.27)
DEPRECATED RDW RBC AUTO: 43.6 FL (ref 37–54)
EOSINOPHIL # BLD AUTO: 0.28 10*3/MM3 (ref 0–0.4)
EOSINOPHIL NFR BLD AUTO: 2.6 % (ref 0.3–6.2)
ERYTHROCYTE [DISTWIDTH] IN BLOOD BY AUTOMATED COUNT: 13.8 % (ref 12.3–15.4)
GFR SERPL CREATININE-BSD FRML MDRD: 34 ML/MIN/1.73
GLOBULIN UR ELPH-MCNC: 2.9 GM/DL
GLUCOSE BLD-MCNC: 249 MG/DL (ref 65–99)
GLUCOSE BLDC GLUCOMTR-MCNC: 117 MG/DL (ref 70–130)
GLUCOSE BLDC GLUCOMTR-MCNC: 272 MG/DL (ref 70–130)
GLUCOSE BLDC GLUCOMTR-MCNC: 392 MG/DL (ref 70–130)
GLUCOSE BLDC GLUCOMTR-MCNC: 394 MG/DL (ref 70–130)
GLUCOSE BLDC GLUCOMTR-MCNC: 395 MG/DL (ref 70–130)
GLUCOSE BLDC GLUCOMTR-MCNC: 434 MG/DL (ref 70–130)
GLUCOSE BLDC GLUCOMTR-MCNC: 480 MG/DL (ref 70–130)
HCT VFR BLD AUTO: 34.1 % (ref 37.5–51)
HGB BLD-MCNC: 11.2 G/DL (ref 13–17.7)
IMM GRANULOCYTES # BLD AUTO: 0.05 10*3/MM3 (ref 0–0.05)
IMM GRANULOCYTES NFR BLD AUTO: 0.5 % (ref 0–0.5)
LYMPHOCYTES # BLD AUTO: 1.47 10*3/MM3 (ref 0.7–3.1)
LYMPHOCYTES NFR BLD AUTO: 13.5 % (ref 19.6–45.3)
MCH RBC QN AUTO: 28.4 PG (ref 26.6–33)
MCHC RBC AUTO-ENTMCNC: 32.8 G/DL (ref 31.5–35.7)
MCV RBC AUTO: 86.5 FL (ref 79–97)
MONOCYTES # BLD AUTO: 1.05 10*3/MM3 (ref 0.1–0.9)
MONOCYTES NFR BLD AUTO: 9.7 % (ref 5–12)
NEUTROPHILS # BLD AUTO: 7.96 10*3/MM3 (ref 1.7–7)
NEUTROPHILS NFR BLD AUTO: 73.3 % (ref 42.7–76)
NRBC BLD AUTO-RTO: 0 /100 WBC (ref 0–0.2)
PLATELET # BLD AUTO: 339 10*3/MM3 (ref 140–450)
PMV BLD AUTO: 10.8 FL (ref 6–12)
POTASSIUM BLD-SCNC: 4.8 MMOL/L (ref 3.5–5.2)
PROT SERPL-MCNC: 6.8 G/DL (ref 6–8.5)
RBC # BLD AUTO: 3.94 10*6/MM3 (ref 4.14–5.8)
SODIUM BLD-SCNC: 136 MMOL/L (ref 136–145)
WBC NRBC COR # BLD: 10.85 10*3/MM3 (ref 3.4–10.8)

## 2019-11-01 PROCEDURE — 80053 COMPREHEN METABOLIC PANEL: CPT | Performed by: FAMILY MEDICINE

## 2019-11-01 PROCEDURE — 85025 COMPLETE CBC W/AUTO DIFF WBC: CPT | Performed by: FAMILY MEDICINE

## 2019-11-01 PROCEDURE — 63710000001 INSULIN LISPRO (HUMAN) PER 5 UNITS: Performed by: FAMILY MEDICINE

## 2019-11-01 PROCEDURE — 25010000002 ENOXAPARIN PER 10 MG: Performed by: FAMILY MEDICINE

## 2019-11-01 PROCEDURE — 63710000001 INSULIN DETEMIR PER 5 UNITS: Performed by: FAMILY MEDICINE

## 2019-11-01 PROCEDURE — 25010000002 HYDROMORPHONE PER 4 MG: Performed by: NURSE PRACTITIONER

## 2019-11-01 PROCEDURE — 97110 THERAPEUTIC EXERCISES: CPT

## 2019-11-01 PROCEDURE — 82962 GLUCOSE BLOOD TEST: CPT

## 2019-11-01 PROCEDURE — 25010000002 PROMETHAZINE PER 50 MG: Performed by: NURSE PRACTITIONER

## 2019-11-01 PROCEDURE — 97116 GAIT TRAINING THERAPY: CPT

## 2019-11-01 RX ORDER — PANTOPRAZOLE SODIUM 40 MG/1
40 TABLET, DELAYED RELEASE ORAL NIGHTLY
Status: DISCONTINUED | OUTPATIENT
Start: 2019-11-01 | End: 2019-11-06 | Stop reason: HOSPADM

## 2019-11-01 RX ORDER — NICOTINE POLACRILEX 4 MG
15 LOZENGE BUCCAL
Status: DISCONTINUED | OUTPATIENT
Start: 2019-11-01 | End: 2019-11-01

## 2019-11-01 RX ORDER — DEXTROSE MONOHYDRATE 25 G/50ML
25 INJECTION, SOLUTION INTRAVENOUS
Status: DISCONTINUED | OUTPATIENT
Start: 2019-11-01 | End: 2019-11-06 | Stop reason: HOSPADM

## 2019-11-01 RX ORDER — DEXTROSE MONOHYDRATE 25 G/50ML
25 INJECTION, SOLUTION INTRAVENOUS
Status: DISCONTINUED | OUTPATIENT
Start: 2019-11-01 | End: 2019-11-01

## 2019-11-01 RX ORDER — NICOTINE POLACRILEX 4 MG
15 LOZENGE BUCCAL
Status: DISCONTINUED | OUTPATIENT
Start: 2019-11-01 | End: 2019-11-06 | Stop reason: HOSPADM

## 2019-11-01 RX ADMIN — OXYCODONE HYDROCHLORIDE AND ACETAMINOPHEN 1 TABLET: 7.5; 325 TABLET ORAL at 19:52

## 2019-11-01 RX ADMIN — NYSTATIN: 100000 POWDER TOPICAL at 03:04

## 2019-11-01 RX ADMIN — PANTOPRAZOLE SODIUM 40 MG: 40 TABLET, DELAYED RELEASE ORAL at 20:28

## 2019-11-01 RX ADMIN — INSULIN DETEMIR 10 UNITS: 100 INJECTION, SOLUTION SUBCUTANEOUS at 09:20

## 2019-11-01 RX ADMIN — PANTOPRAZOLE SODIUM 40 MG: 40 TABLET, DELAYED RELEASE ORAL at 09:19

## 2019-11-01 RX ADMIN — BUMETANIDE 1 MG: 0.25 INJECTION INTRAMUSCULAR; INTRAVENOUS at 09:19

## 2019-11-01 RX ADMIN — PROMETHAZINE HYDROCHLORIDE 12.5 MG: 25 INJECTION INTRAMUSCULAR; INTRAVENOUS at 00:34

## 2019-11-01 RX ADMIN — BISACODYL 10 MG: 10 SUPPOSITORY RECTAL at 01:01

## 2019-11-01 RX ADMIN — NYSTATIN: 100000 POWDER TOPICAL at 22:45

## 2019-11-01 RX ADMIN — CLINDAMYCIN IN 5 PERCENT DEXTROSE 900 MG: 18 INJECTION, SOLUTION INTRAVENOUS at 10:31

## 2019-11-01 RX ADMIN — Medication 250 MG: at 20:28

## 2019-11-01 RX ADMIN — OXYCODONE HYDROCHLORIDE AND ACETAMINOPHEN 1 TABLET: 7.5; 325 TABLET ORAL at 03:28

## 2019-11-01 RX ADMIN — SODIUM CHLORIDE, PRESERVATIVE FREE 10 ML: 5 INJECTION INTRAVENOUS at 09:19

## 2019-11-01 RX ADMIN — INSULIN LISPRO 20 UNITS: 100 INJECTION, SOLUTION INTRAVENOUS; SUBCUTANEOUS at 18:09

## 2019-11-01 RX ADMIN — GABAPENTIN 100 MG: 100 CAPSULE ORAL at 09:19

## 2019-11-01 RX ADMIN — ENOXAPARIN SODIUM 30 MG: 30 INJECTION SUBCUTANEOUS at 20:32

## 2019-11-01 RX ADMIN — GABAPENTIN 100 MG: 100 CAPSULE ORAL at 20:28

## 2019-11-01 RX ADMIN — LACTULOSE 20 G: 20 SOLUTION ORAL at 09:23

## 2019-11-01 RX ADMIN — LOSARTAN POTASSIUM 100 MG: 50 TABLET, FILM COATED ORAL at 09:19

## 2019-11-01 RX ADMIN — INSULIN DETEMIR 10 UNITS: 100 INJECTION, SOLUTION SUBCUTANEOUS at 20:27

## 2019-11-01 RX ADMIN — DULOXETINE HYDROCHLORIDE 60 MG: 30 CAPSULE, DELAYED RELEASE ORAL at 09:19

## 2019-11-01 RX ADMIN — CLINDAMYCIN IN 5 PERCENT DEXTROSE 900 MG: 18 INJECTION, SOLUTION INTRAVENOUS at 19:52

## 2019-11-01 RX ADMIN — PROMETHAZINE HYDROCHLORIDE 12.5 MG: 25 INJECTION INTRAMUSCULAR; INTRAVENOUS at 19:52

## 2019-11-01 RX ADMIN — MINERAL OIL 360 ML: 1000 LIQUID ORAL at 01:00

## 2019-11-01 RX ADMIN — INSULIN LISPRO 24 UNITS: 100 INJECTION, SOLUTION INTRAVENOUS; SUBCUTANEOUS at 20:27

## 2019-11-01 RX ADMIN — NYSTATIN 1 APPLICATION: 100000 POWDER TOPICAL at 09:22

## 2019-11-01 RX ADMIN — CLINDAMYCIN IN 5 PERCENT DEXTROSE 900 MG: 18 INJECTION, SOLUTION INTRAVENOUS at 03:04

## 2019-11-01 RX ADMIN — OXYCODONE HYDROCHLORIDE AND ACETAMINOPHEN 1 TABLET: 7.5; 325 TABLET ORAL at 09:18

## 2019-11-01 RX ADMIN — INSULIN LISPRO 12 UNITS: 100 INJECTION, SOLUTION INTRAVENOUS; SUBCUTANEOUS at 12:36

## 2019-11-01 RX ADMIN — PROMETHAZINE HYDROCHLORIDE 12.5 MG: 25 INJECTION INTRAMUSCULAR; INTRAVENOUS at 10:25

## 2019-11-01 RX ADMIN — MILK OF MAGNESIA 10 ML: 2400 CONCENTRATE ORAL at 01:00

## 2019-11-01 RX ADMIN — POLYETHYLENE GLYCOL 3350 17 G: 17 POWDER, FOR SOLUTION ORAL at 16:55

## 2019-11-01 RX ADMIN — HYDROMORPHONE HYDROCHLORIDE 0.5 MG: 1 INJECTION, SOLUTION INTRAMUSCULAR; INTRAVENOUS; SUBCUTANEOUS at 00:59

## 2019-11-01 RX ADMIN — ASPIRIN 81 MG: 81 TABLET ORAL at 09:19

## 2019-11-01 RX ADMIN — ROSUVASTATIN CALCIUM 40 MG: 20 TABLET, FILM COATED ORAL at 09:19

## 2019-11-01 RX ADMIN — LACTULOSE 20 G: 20 SOLUTION ORAL at 20:28

## 2019-11-01 RX ADMIN — Medication 250 MG: at 09:19

## 2019-11-01 RX ADMIN — INSULIN LISPRO 8 UNITS: 100 INJECTION, SOLUTION INTRAVENOUS; SUBCUTANEOUS at 09:19

## 2019-11-01 RX ADMIN — LATANOPROST 1 DROP: 50 SOLUTION OPHTHALMIC at 22:45

## 2019-11-01 NOTE — THERAPY TREATMENT NOTE
Acute Care - Physical Therapy Treatment Note  [facility]     Patient Name: Erick Luong  : 1955  MRN: [id number]  Today's Date: 2019  Onset of Illness/Injury or Date of Surgery: 10/27/19          Admit Date: 10/27/2019    Visit Dx:    ICD-10-CM ICD-9-CM   1. WANDER (acute kidney injury) (CMS/[facility]) N17.9 584.9   2. Congestive heart failure, unspecified HF chronicity, unspecified heart failure type (CMS/[facility]) I50.9 428.0   3. Urinary tract infection without hematuria, site unspecified N39.0 599.0   4. Decreased mobility and endurance Z74.09 780.99     Patient Active Problem List   Diagnosis   • Lower abdominal pain   • SIRS (systemic inflammatory response syndrome) (CMS/[facility])   • Fever, unknown origin   • Viral gastroenteritis   • Chronic diastolic congestive heart failure (CMS/[facility])   • CAD (coronary artery disease)   • Diabetes mellitus (CMS/[facility])   • Essential hypertension   • Sleep apnea   • Arthritis   • Mixed hyperlipidemia   • Shortness of breath   • Acute pancreatitis   • Chest pain, non-cardiac   • Obesity, unspecified obesity severity, unspecified obesity type   • HTN (hypertension), benign   • Epigastric pain   • Type 2 diabetes mellitus with hyperglycemia, with long-term current use of insulin (CMS/[facility])   • Pleuritic chest pain   • Slow transit constipation   • Nausea and vomiting   • CKD (chronic kidney disease)   • Nonsmoker   • Orthostatic hypotension   • Abdominal pain   • Constipation   • Ischemic colitis (CMS/[facility])   • Altered mental status   • Diabetic foot infection (CMS/[facility])   • WANDER (acute kidney injury) (CMS/[facility])   • Anemia due to chronic kidney disease   • Morbid obesity with BMI of 40.0-44.9, adult (CMS/[facility])   • Infection due to enterococcus   • Osteomyelitis of fifth toe of right foot (CMS/[facility])   • CHF (congestive heart failure) (CMS/[facility])       Therapy Treatment    Rehabilitation Treatment Summary     Row Name 19 1442 19 1023          Treatment Time/Intention    Discipline   physical therapy assistant  -AB  physical therapy assistant  -KJ     Document Type  therapy note (daily note)  -AB  --     Subjective Information  no complaints  -AB2  --     Mode of Treatment  physical therapy  -AB2  --     Comment  --  nauseated wants to wait until pm  -KJ2     Existing Precautions/Restrictions  fall  -AB2  --     Recorded by [AB] Donna Rodriguez, PTA 11/01/19 1504  [AB2] Donna Rodriguez, PTA 11/01/19 1510 [KJ] Claudia Prakash, PTA 11/01/19 1023  [KJ2] Claudia Prakash, PTA 11/01/19 1026     Row Name 11/01/19 1442             Bed Mobility Assessment/Treatment    Bed Mobility Assessment/Treatment  supine-sit  -AB      Supine-Sit [place] (Bed Mobility)  conditional independence  -AB      Recorded by [AB] Donna Rodriguez, PTA 11/01/19 1510      Row Name 11/01/19 1442             Bed-Chair Transfer    Bed-Chair [place] (Transfers)  conditional independence  -AB      Recorded by [AB] Donna Rodriguez, PTA 11/01/19 1510      Row Name 11/01/19 1442             Sit-Stand Transfer    Sit-Stand [place] (Transfers)  conditional independence  -AB      Recorded by [AB] Donna Rodriguez, PTA 11/01/19 1510      Row Name 11/01/19 1442             Gait/Stairs Assessment/Training    [place] Level (Gait)  stand by assist  -AB      Distance in Feet (Gait)  250  -AB      Recorded by [AB] Donna Rodriguez, PTA 11/01/19 1510      Row Name 11/01/19 1442             Therapeutic Exercise    Exercise Type (Therapeutic Exercise)  AROM (active range of motion)  -AB      Position (Therapeutic Exercise)  seated  -AB      Sets/Reps (Therapeutic Exercise)  20  -AB      Recorded by [AB] Donna Rodriguez, PTA 11/01/19 1510      Row Name 11/01/19 1442             Positioning and Restraints    Pre-Treatment Position  in bed  -AB      Post Treatment Position  chair  -AB      In Chair  sitting;call light within reach  -AB      Recorded by [AB] Donna Rodriguez, PTA 11/01/19 1510      Row Name                Wound  07/22/19 2120 Right posterior foot diabetic/neuropathic ulceration    Wound - Properties Group Date first assessed: 07/22/19 [KB] Time first assessed: 2120 [KB] Side: Right [KB] Orientation: posterior [KB] Location: foot [KB] Type: diabetic/neuropathic ulceration [KB] Recorded by:  [KB] Samantha Israel RN 07/23/19 0008      User Key  (r) = Recorded By, (t) = Taken By, (c) = Cosigned By    Initials Name Effective Dates Discipline    AB Donna Rodriguez, PTA 08/02/16 -  PT    Claudia Maddox, PTA 08/02/16 -  PT    Samantha Edwards RN 12/31/18 -  Nurse          Wound 07/22/19 2120 Right posterior foot diabetic/neuropathic ulceration (Active)   Dressing Appearance no drainage 10/31/2019  8:00 PM   Base dressing in place, unable to visualize 10/31/2019  8:00 PM   Drainage Amount none 11/1/2019  3:28 AM   Care, Wound cleansed with;sterile normal saline 11/1/2019  3:28 AM   Dressing Care, Wound dressing changed;gauze, wet-to-dry;elastic bandage 11/1/2019  3:28 AM           Physical Therapy Education     Title: PT OT SLP Therapies (Done)     Topic: Physical Therapy (Done)     Point: Mobility training (Done)     Learning Progress Summary           Patient Acceptance, E, VU by AISHWARYA at 10/31/2019  2:18 PM    Comment:  Pt. educated on increasing gait speed/stride length to prevent risk of fall    Acceptance, E, VU by AISHWARYA at 10/30/2019 10:17 AM    Comment:  Pt. educated on importance of maintaining AROM DF R foot needed for gait safety    Acceptance, E, VU by AISHWARYA at 10/29/2019 10:55 AM    Comment:  Pt. educated on importance on ambulating with reduced WB on R foot to  decrease pain    Acceptance, E,D, NR,VU by DE at 10/28/2019  9:46 AM    Comment:  pt educated on safety and proper techinue for functional mobility. pt intructed on proper body mechanics to maximize function and adhere to current safety precautions. pt needs reinforcement with proper use of RW to decrease weight bearing through R foot                   Point: Body  mechanics (Done)     Learning Progress Summary           Patient Acceptance, E, VU by AISHWARYA at 10/31/2019  2:18 PM    Comment:  Pt. educated on increasing gait speed/stride length to prevent risk of fall    Acceptance, E, VU by AISHWARYA at 10/30/2019 10:17 AM    Comment:  Pt. educated on importance of maintaining AROM DF R foot needed for gait safety    Acceptance, E, VU by AISHWARYA at 10/29/2019 10:55 AM    Comment:  Pt. educated on importance on ambulating with reduced WB on R foot to  decrease pain    Acceptance, E,D, NR,VU by DE at 10/28/2019  9:46 AM    Comment:  pt educated on safety and proper techinue for functional mobility. pt intructed on proper body mechanics to maximize function and adhere to current safety precautions. pt needs reinforcement with proper use of RW to decrease weight bearing through R foot                   Point: Precautions (Done)     Learning Progress Summary           Patient Acceptance, E, VU by AISHWARYA at 10/31/2019  2:18 PM    Comment:  Pt. educated on increasing gait speed/stride length to prevent risk of fall    Acceptance, E, VU by AISHWARYA at 10/30/2019 10:17 AM    Comment:  Pt. educated on importance of maintaining AROM DF R foot needed for gait safety    Acceptance, E, VU by AISHWARYA at 10/29/2019 10:55 AM    Comment:  Pt. educated on importance on ambulating with reduced WB on R foot to  decrease pain    Acceptance, E,D, NR,VU by DE at 10/28/2019  9:46 AM    Comment:  pt educated on safety and proper techinue for functional mobility. pt intructed on proper body mechanics to maximize function and adhere to current safety precautions. pt needs reinforcement with proper use of RW to decrease weight bearing through R foot                               User Key     Initials Effective Dates Name Provider Type Discipline     09/18/19 -  Melly Méndez, PTA Student PTA Student PT    DE 10/10/19 -  Sudarshan Hogue, PT Student PT Student PT                PT Recommendation and Plan           Time Calculation:    PT Charges     Row Name 11/01/19 1442             Time Calculation    Start Time  1442  -AB      Stop Time  1507  -AB      Time Calculation (min)  25 min  -AB      PT Received On  11/01/19  -AB      PT Goal Re-Cert Due Date  11/07/19  -AB         Time Calculation- PT    Total Timed Code Minutes- PT  25 minute(s)  -AB        User Key  (r) = Recorded By, (t) = Taken By, (c) = Cosigned By    Initials Name Provider Type    AB Donna Rodriguez, PARUL Physical Therapy Assistant        Therapy Charges for Today     Code Description Service Date Service Provider Modifiers Qty    86954407108 HC PT THER PROC EA 15 MIN 11/1/2019 Donna Rodriguez, PTA GP 1    19864204830 HC GAIT TRAINING EA 15 MIN 11/1/2019 Donna Rodriguez, PTA GP 1          PT G-Codes  Outcome Measure Options: AM-PAC 6 Clicks Daily Activity (OT)  AM-PAC 6 Clicks Score (PT): 22  AM-PAC 6 Clicks Score (OT): 20    Donna Rodriguez PTA  11/1/2019

## 2019-11-01 NOTE — PROGRESS NOTES
Nephrology ([facility]) Progress Note      Patient:  Erick Luong  YOB: 1955  Date of Service: 11/1/2019  MRN: [id number]   Acct: [id number]   Primary Care Physician: Del Shetty MD  Advance Directive:   Code Status and Medical Interventions:   Ordered at: 10/27/19 2021     Level Of Support Discussed With:    Patient     Code Status:    CPR     Medical Interventions (Level of Support Prior to Arrest):    Full     Admit Date: 10/27/2019       Hospital Day: 5  Referring Provider: Abebe Garzon DO      Patient personally seen and examined.  Complete chart including Consults, Notes, Operative Reports, Labs, Cardiology, and Radiology studies reviewed as able.        Subjective:  Erick Luong is a 64 y.o. male  whom we were consulted for acute kidney injury.  Baseline chronic kidney disease stage 3, baseline creatinine 1.3.  Has followed with our office on an irregular basis in the past.  History of type 2 diabetes, hypertension, congestive heart failure.  Undergoing treatment for chronic right foot wound; has PICC line in place and has been getting IV Vancomycin.  Last few days has developed increased edema, dyspnea. Unable to lay flat at night.  Diminished urine output.  Hospital course remarkable for initiation of Bumex drip with excellent response. Transitioned to intermittent IV dose Bumex on 10/29.  Night of 10/29 had apparent allergic reaction to Vancomycin; required a rapid response call.    Today edema slightly better.  Feeling poorly today; did not rest last night.  No new overnight issues. Urine output nonoliguric    Allergies:  Bactrim [sulfamethoxazole-trimethoprim] and Vancomycin    Home Meds:  Medications Prior to Admission   Medication Sig Dispense Refill Last Dose   • aspirin 81 MG EC tablet Take 81 mg by mouth Daily.   10/27/2019 at Unknown time   • bumetanide (BUMEX) 2 MG tablet Take 1 tablet by mouth 2 (Two) Times a Day As Needed (edema). (Patient  taking differently: Take 2 mg by mouth 2 (Two) Times a Day.) 30 tablet 2 10/27/2019 at Unknown time   • DULoxetine (CYMBALTA) 60 MG capsule Take 60 mg by mouth Daily.   10/27/2019 at Unknown time   • gabapentin (NEURONTIN) 100 MG capsule Take 100 mg by mouth 2 (Two) Times a Day.   10/27/2019 at Unknown time   • insulin glargine (LANTUS) 100 UNIT/ML injection Inject 20 Units under the skin into the appropriate area as directed Every Night.   10/27/2019 at Unknown time   • insulin regular (humuLIN R) 500 UNIT/ML CONCENTRATED injection Inject 100 Units under the skin 3 (Three) Times a Day Before Meals. Packaging states 100 units with regular meals; 120 units with large meals or 360 units daily   10/27/2019 at Unknown time   • lactulose (CHRONULAC) 10 GM/15ML solution Take 20 g by mouth 3 (Three) Times a Day As Needed.   10/27/2019 at Unknown time   • latanoprost (XALATAN) 0.005 % ophthalmic solution Administer 1 drop to both eyes Every Night.   10/27/2019 at Unknown time   • losartan (COZAAR) 100 MG tablet Take 100 mg by mouth Daily.   10/27/2019 at Unknown time   • ondansetron ODT (ZOFRAN-ODT) 8 MG disintegrating tablet Take 8 mg by mouth Every 6 (Six) Hours As Needed for Nausea or Vomiting.   10/27/2019 at Unknown time   • oxyCODONE-acetaminophen (PERCOCET)  MG per tablet Take 1 tablet by mouth 2 (Two) Times a Day.   10/27/2019 at Unknown time   • pantoprazole (PROTONIX) 40 MG EC tablet Take 1 tablet by mouth Daily. 90 tablet 3 10/27/2019 at Unknown time   • rosuvastatin (CRESTOR) 40 MG tablet Take 40 mg by mouth Daily.   10/27/2019 at Unknown time   • saccharomyces boulardii (FLORASTOR) 250 MG capsule Take 250 mg by mouth 2 (Two) Times a Day.   10/27/2019 at Unknown time   • traZODone (DESYREL) 50 MG tablet Take 25-50 mg by mouth At Night As Needed for Sleep.   10/26/2019 at Unknown time       Medicines:  Current Facility-Administered Medications   Medication Dose Route Frequency Provider Last Rate Last Dose    • acetaminophen (TYLENOL) tablet 650 mg  650 mg Oral Q4H PRN Abebe Garzon DO        Or   • acetaminophen (TYLENOL) 160 MG/5ML solution 650 mg  650 mg Oral Q4H PRN Abebe Garzon DO        Or   • acetaminophen (TYLENOL) suppository 650 mg  650 mg Rectal Q4H PRN Abebe Garzon DO       • aspirin EC tablet 81 mg  81 mg Oral Daily Abebe Garzon DO   81 mg at 11/01/19 0919   • bisacodyl (DULCOLAX) suppository 10 mg  10 mg Rectal Daily PRN Raquel Duncan, APRN   10 mg at 11/01/19 0101   • bumetanide (BUMEX) injection 1 mg  1 mg Intravenous Daily Stewart Alvarez, APRN   1 mg at 11/01/19 0919   • clindamycin (CLEOCIN) 900 mg in dextrose 5% 50 mL IVPB (premix)  900 mg Intravenous Q8H Julia Adrian MD 50 mL/hr at 11/01/19 1031 900 mg at 11/01/19 1031   • dextrose (D50W) 25 g/ 50mL Intravenous Solution 25 g  25 g Intravenous Q15 Min PRN Telly Rodriguez MD       • dextrose (GLUTOSE) oral gel 15 g  15 g Oral Q15 Min PRN Telly Rodriguez MD       • diphenhydrAMINE (BENADRYL) injection 25 mg  25 mg Intravenous Q6H PRN Raquel Duncan, APRN   25 mg at 10/29/19 2336   • DULoxetine (CYMBALTA) DR capsule 60 mg  60 mg Oral Daily Abebe Garzon DO   60 mg at 11/01/19 0919   • enoxaparin (LOVENOX) syringe 30 mg  30 mg Subcutaneous Q24H Telly Rodriguez MD   30 mg at 10/31/19 2128   • gabapentin (NEURONTIN) capsule 100 mg  100 mg Oral BID Abebe Garzon DO   100 mg at 11/01/19 0919   • glucagon (human recombinant) (GLUCAGEN DIAGNOSTIC) injection 1 mg  1 mg Subcutaneous Q15 Min PRN Telly Rodriguez MD       • Influenza Vac Subunit Quad (FLUCELVAX) injection 0.5 mL  0.5 mL Intramuscular During Hospitalization Abebe Garzon DO       • insulin detemir (LEVEMIR) injection 10 Units  10 Units Subcutaneous Q12H Telly Rodriguez MD   10 Units at 11/01/19 0920   • insulin lispro (humaLOG) injection 0-14 Units  0-14 Units Subcutaneous 4x Daily With Meals & Nightly Telly Rodriguez MD   8 Units at  11/01/19 0919   • ipratropium-albuterol (DUO-NEB) nebulizer solution 3 mL  3 mL Nebulization Q6H PRN Abebe Garzon DO       • lactulose solution 20 g  20 g Oral BID Abebe Garzon DO   20 g at 11/01/19 0923   • latanoprost (XALATAN) 0.005 % ophthalmic solution 1 drop  1 drop Both Eyes Nightly Telly Rodriguez MD   1 drop at 10/31/19 2129   • losartan (COZAAR) tablet 100 mg  100 mg Oral Q24H Abebe Garzon DO   100 mg at 11/01/19 0919   • magnesium hydroxide (MILK OF MAGNESIA) suspension 2400 mg/10mL 10 mL  10 mL Oral Daily PRN Raquel Duncan, APRN   10 mL at 11/01/19 0100   • methylnaltrexone (RELISTOR) injection 8 mg  8 mg Subcutaneous Every Other Day Abebe Garzon DO   8 mg at 10/31/19 0948   • nystatin (MYCOSTATIN) powder   Topical Q12H Raquel Duncan, APRN   1 application at 11/01/19 0922   • oxyCODONE-acetaminophen (PERCOCET) 7.5-325 MG per tablet 1 tablet  1 tablet Oral Q4H PRN Telly Rodriguez MD   1 tablet at 11/01/19 0918   • pantoprazole (PROTONIX) EC tablet 40 mg  40 mg Oral Daily Abebe Garzon DO   40 mg at 11/01/19 0919   • promethazine (PHENERGAN) injection 12.5 mg  12.5 mg Intravenous Q6H PRN Raquel Duncan, APRN   12.5 mg at 11/01/19 1025   • rosuvastatin (CRESTOR) tablet 40 mg  40 mg Oral Daily Abebe Garzon DO   40 mg at 11/01/19 0919   • saccharomyces boulardii (FLORASTOR) capsule 250 mg  250 mg Oral BID Abebe Garzon DO   250 mg at 11/01/19 0919   • sodium chloride 0.9 % flush 10 mL  10 mL Intravenous PRN Reuben Tadeo MD       • sodium chloride 0.9 % flush 10 mL  10 mL Intravenous Q12H Abebe Garzon DO   10 mL at 11/01/19 0919   • sodium chloride 0.9 % flush 10 mL  10 mL Intravenous PRN Abebe Garzon DO       • traZODone (DESYREL) tablet 25 mg  25 mg Oral Nightly PRN Abebe Garzon DO           Past Medical History:  Past Medical History:   Diagnosis Date   • Arthritis    • CHF (congestive heart failure) (CMS/[facility])    •  Coronary artery disease    • Diabetes mellitus (CMS/HCC)    • Hyperlipidemia    • Hypertension    • Myocardial infarction (CMS/HCC)    • Pancreatitis    • Renal disorder    • Sleep apnea with use of continuous positive airway pressure (CPAP)        Past Surgical History:  Past Surgical History:   Procedure Laterality Date   • ABDOMINAL SURGERY     • APPENDECTOMY     • BACK SURGERY     • CARDIAC SURGERY     • CATARACT EXTRACTION     • CERVICAL SPINE SURGERY     • COLONOSCOPY N/A 1/31/2017    Normal exam repeat in 5 years   • COLONOSCOPY N/A 2/11/2019    Procedure: COLONOSCOPY WITH ANESTHESIA;  Surgeon: Tyrell Smith MD;  Location: [facility] ENDOSCOPY;  Service: Gastroenterology   • COLONOSCOPY W/ POLYPECTOMY  03/04/2014    Hyperplastic polyp   • CORONARY ARTERY BYPASS GRAFT  10/2015   • ENDOSCOPY  04/13/2011    Gastritis with hemorrhage   • ENDOSCOPY N/A 5/5/2017    Normal exam   • ENDOSCOPY N/A 2/11/2019    Procedure: ESOPHAGOGASTRODUODENOSCOPY WITH ANESTHESIA;  Surgeon: Tyrell Smith MD;  Location: [facility] ENDOSCOPY;  Service: Gastroenterology   • INCISION AND DRAINAGE OF WOUND Left 09/2007    spider bite       Family History  Family History   Problem Relation Age of Onset   • Colon cancer Father    • Heart disease Father    • Colon cancer Sister    • Colon polyps Sister    • Alzheimer's disease Mother    • Coronary artery disease Sister    • Coronary artery disease Sister        Social History  Social History     Socioeconomic History   • Marital status:      Spouse name: Not on file   • Number of children: Not on file   • Years of education: Not on file   • Highest education level: Not on file   Tobacco Use   • Smoking status: Never Smoker   • Smokeless tobacco: Never Used   • Tobacco comment: smoked in highschool   Substance and Sexual Activity   • Alcohol use: No   • Drug use: No   • Sexual activity: Defer         Review of Systems:  History obtained from chart review and the patient  General ROS:  Patient complains of fatigue and No fever or chills  Respiratory ROS: No cough, shortness of breath, wheezing  Cardiovascular ROS: positive for - dyspnea on exertion and edema  No chest pain or palpitations  Gastrointestinal ROS: No abdominal pain or melena  Genito-Urinary ROS: No dysuria or hematuria  Neurological ROS: no headache or dizziness    Objective:  Patient Vitals for the past 24 hrs:   BP Temp Temp src Pulse Resp SpO2 Weight   11/01/19 1120 100/69 98.1 °F (36.7 °C) Oral 85 16 95 % --   11/01/19 0617 105/59 97.8 °F (36.6 °C) Oral 79 16 97 % (!) 152 kg (334 lb)   11/01/19 0239 146/59 97.6 °F (36.4 °C) Oral 83 16 96 % --   10/31/19 2223 105/64 98.1 °F (36.7 °C) Oral 82 16 98 % (!) 150 kg (331 lb 9.6 oz)   10/31/19 1431 97/60 97.8 °F (36.6 °C) Oral 80 16 99 % --       Intake/Output Summary (Last 24 hours) at 11/1/2019 1137  Last data filed at 11/1/2019 1120  Gross per 24 hour   Intake 820 ml   Output 1650 ml   Net -830 ml     General: awake/alert    Neck: supple, no JVD  Chest:  clear to auscultation bilaterally without respiratory distress  CVS: regular rate and rhythm  Abdominal: soft, nontender, positive bowel sounds  Extremities: 1+ lower extremity edema  Skin: warm and dry without rash   Neuro: no focal motor deficits      Labs:  Results from last 7 days   Lab Units 11/01/19  0736 10/31/19  0540 10/30/19  0549   WBC 10*3/mm3 10.85* 10.53 11.16*   HEMOGLOBIN g/dL 11.2* 11.3* 11.4*   HEMATOCRIT % 34.1* 33.7* 33.5*   PLATELETS 10*3/mm3 339 397 356         Results from last 7 days   Lab Units 11/01/19 0736 10/31/19  0540 10/30/19  0549   SODIUM mmol/L 136 137 136   POTASSIUM mmol/L 4.8 4.2 4.5   CHLORIDE mmol/L 94* 93* 94*   CO2 mmol/L 29.0 29.0 26.0   BUN mg/dL 57* 58* 47*   CREATININE mg/dL 2.02* 2.13* 2.05*   CALCIUM mg/dL 8.7 9.2 9.1   BILIRUBIN mg/dL 0.3 0.3 0.3   ALK PHOS U/L 68 82 83   ALT (SGPT) U/L 16 16 15   AST (SGOT) U/L 19 15 17   GLUCOSE mg/dL 249* 104* 207*       Radiology:   Imaging Results  (last 72 hours)     Procedure Component Value Units Date/Time    CT Angiogram Chest [[id number]] Collected:  10/27/19 1906     Updated:  10/27/19 1911    Narrative:       CT ANGIOGRAM CHEST- 10/27/2019 6:32 PM CDT      HISTORY: Chest pain, acute, PE suspected, intermed prob, positive  D-dimer      COMPARISON: 01/18/2018.      DOSE LENGTH PRODUCT: 525 mGy cm. Automated exposure control was also  utilized to decrease patient radiation dose.     TECHNIQUE: Helical tomographic images of the chest were obtained after  the administration of intravenous contrast following angiogram protocol.  Additionally, 3D and multiplanar reformatted images were provided.        FINDINGS:    Pulmonary arteries: There is adequate enhancement of the pulmonary  arteries to evaluate for central and segmental pulmonary emboli. There  are no filling defects within the main, lobar, segmental or visualized  subsegmental pulmonary arteries. The pulmonary arteries are within  normal limits for size.      Aorta and great vessels: The aorta is well opacified and demonstrates no  dissection or aneurysm. The great vessels are normal in appearance.  Changes of previous CABG are noted.     Visualized neck base: The imaged portion of the base of the neck appears  unremarkable.      Lungs: Dependent changes are seen in both lung bases. There is no  pulmonary edema or focal consolidation. No worrisome nodules are  identified. Small pleural effusions are present bilaterally. The trachea  and bronchial tree are patent.      Heart: Mild dilation of the heart is again noted. There is no  pericardial effusion.      Mediastinum and lymph nodes: No enlarged mediastinal, hilar, or axillary  lymph nodes are present.      Skeletal and soft tissues: The osseous structures of the thorax and  surrounding soft tissues demonstrate no acute process.     Upper abdomen: The imaged portion of the upper abdomen demonstrates no  acute process.        Impression:       1. Small  pleural effusions may be due to volume overload.  2. No evidence of pulmonary embolus or pneumonia.        This report was finalized on 10/27/2019 19:08 by Dr. Sudarshan Starr MD.    CT Abdomen Pelvis With Contrast [[id number]] Collected:  10/27/19 1903     Updated:  10/27/19 1908    Narrative:       CT ABDOMEN PELVIS W CONTRAST- 10/27/2019 6:32 PM CDT     HISTORY: Abdominal pain, unspecified       COMPARISON: 10/12/2019.      DOSE LENGTH PRODUCT: 1919 mGy cm. Automated exposure control was also  utilized to decrease patient radiation dose.     TECHNIQUE: Following the intravenous administration of contrast, helical  CT tomographic images of the abdomen and pelvis were acquired.  Multiplanar reformatted images were provided for review.      FINDINGS:   LOWER CHEST: Findings in the chest will be described in a separate  dictation.      LIVER: No focal liver lesion. The hepatic vasculature is patent.      BILIARY SYSTEM: The gallbladder has been removed. There is no evidence  of biliary ductal dilation.      PANCREAS: There is mild atrophy in the pancreas.      SPLEEN: Unremarkable.      KIDNEYS: Simple cysts are seen in the RIGHT kidney. There has been no  significant interval change. There is no hydronephrosis. The LEFT kidney  is unremarkable. The ureters are decompressed and normal in appearance.     ADRENALS: Unremarkable.     RETROPERITONEUM: No mass, lymphadenopathy or hemorrhage.      GI TRACT: No evidence of obstruction or bowel wall thickening. The  appendix has been removed.     OTHER: There is no mesenteric mass, lymphadenopathy or fluid collection.  The abdominopelvic vasculature is patent. The osseous structures and  soft tissues demonstrate no worrisome lesions. Mild subcutaneous edema  is again noted in the lower abdominal wall.      PELVIS: No mass lesion, fluid collection or significant lymphadenopathy  is seen in the pelvis. The urinary bladder is normal in appearance.       Impression:       1. No  evidence of acute abdominopelvic process.   2. Subcutaneous edema in the anterior abdomen and in the dependent  portions of the hips and buttocks. Findings could be due to volume  overload.        This report was finalized on 10/27/2019 19:05 by Dr. Sudarshan Starr MD.    XR Chest 1 View [[id number]] Collected:  10/27/19 1640     Updated:  10/27/19 1644    Narrative:       Exam: XR CHEST 1 VW-     Indication: Chest Pain Triage Protocol     Comparison: 10/12/2019     Findings:     Cardiac silhouette is stable. Postoperative change of CABG. Chronic mild  right hemidiaphragm elevation. No definite consolidation, large pleural  effusion, or visible pneumothorax. Left upper extremity PICC is stable  in position. No acute osseous findings.       Impression:          No acute findings.  This report was finalized on 10/27/2019 16:41 by Dr. Santana Figueredo MD.          Culture:  Blood Culture   Date Value Ref Range Status   10/27/2019 No growth at 24 hours  Preliminary   10/27/2019 No growth at 24 hours  Preliminary         Assessment   1.  Acute kidney injury due to ATN--stable  2.  Baseline chronic kidney disease stage 3  3.  Type 2 diabetes with nephropathy  4.  Essential hypertension  5.  Right foot wound  6.  Volume overload--improving  7.  Anemia     Plan:  1.  Continue  Bumex  2.  Monitor labs    Stewart Alvarez, APRN  11/1/2019  11:37 AM

## 2019-11-01 NOTE — PROGRESS NOTES
[facility] Medicine Services  INPATIENT PROGRESS NOTE    Length of Stay: 5  Date of Admission: 10/27/2019  Primary Care Physician: Del Shetty MD    Subjective   Chief Complaint: Acute on chronic renal failure.  Osteoarthritis.  Chest pain.  Shortness of breath.    HPI   Patient has 2 large bowel last night.  Patient abdomen is much better.  Patient denies any chest pain or shortness of breath.  Kidney function is about the same.  On Bumex per nephrology.  Net urine output -2000.    Review of Systems   Constitutional: Positive for activity change, appetite change and fatigue. Negative for chills and fever.   HENT: Negative for hearing loss, nosebleeds, tinnitus and trouble swallowing.    Eyes: Negative for visual disturbance.   Respiratory: Positive for shortness of breath and wheezing. Negative for cough and chest tightness.    Cardiovascular: Negative for chest pain, palpitations and leg swelling.   Gastrointestinal: Positive for abdominal pain and nausea. Negative for abdominal distention, blood in stool, constipation, diarrhea and vomiting.   Endocrine: Negative for cold intolerance, heat intolerance, polydipsia, polyphagia and polyuria.   Genitourinary: Negative for decreased urine volume, difficulty urinating, dysuria, flank pain, frequency and hematuria.   Musculoskeletal: Positive for arthralgias, back pain, gait problem and myalgias. Negative for joint swelling.   Skin: Positive for wound. Negative for rash.        Right foot.   Allergic/Immunologic: Negative for immunocompromised state.   Neurological: Positive for weakness. Negative for dizziness, syncope, light-headedness and headaches.   Hematological: Negative for adenopathy. Does not bruise/bleed easily.   Psychiatric/Behavioral: Negative for confusion and sleep disturbance. The patient is not nervous/anxious.    All pertinent negatives and positives are as above. All other systems have been reviewed and are  negative unless otherwise stated.     Objective    Temp:  [97.6 °F (36.4 °C)-98.1 °F (36.7 °C)] 98.1 °F (36.7 °C)  Heart Rate:  [79-90] 90  Resp:  [16] 16  BP: (100-146)/(48-69) 114/48    Intake/Output Summary (Last 24 hours) at 11/1/2019 1529  Last data filed at 11/1/2019 1506  Gross per 24 hour   Intake 410 ml   Output 2850 ml   Net -2440 ml     Physical Exam  Constitutional: He is oriented to person, place, and time. He appears well-developed.   HENT:   Head: Normocephalic and atraumatic.   Eyes: Conjunctivae are normal. Pupils are equal, round, and reactive to light.   Neck: Neck supple. No JVD present. No thyromegaly present.   Cardiovascular: Normal rate, regular rhythm, normal heart sounds and intact distal pulses. Exam reveals no gallop and no friction rub.   No murmur heard.  Pulmonary/Chest: Effort normal. No respiratory distress.  Diminished breath sound bilateral, clear.  He has no rales. He exhibits no tenderness.   Diminished breath sound bilateral.  Patient is not requiring oxygen.   Abdominal: Soft. Bowel sounds are normal. He exhibits no distension. There is no tenderness. There is no rebound and no guarding.   Gross morbid obesity.   Musculoskeletal: He exhibits edema. He exhibits no tenderness or deformity.   +2 pitting edema lower extremities   Lymphadenopathy:     He has no cervical adenopathy.   Neurological: He is alert and oriented to person, place, and time. He displays normal reflexes. No cranial nerve deficit. He exhibits abnormal muscle tone. Coordination abnormal.   Skin: Skin is warm and dry. Capillary refill takes 2 to 3 seconds. No rash noted.   Right foot wound.  Covered gauze.   Psychiatric: He has a normal mood and affect. His behavior is normal. Thought content normal.   Nursing note and vitals reviewed.  Results Review:  Lab Results (last 24 hours)     Procedure Component Value Units Date/Time    POC Glucose Once [[id number]]  (Abnormal) Collected:  11/01/19 1228    Specimen:   Blood Updated:  11/01/19 1239     Glucose 394 mg/dL      Comment: : [id number] Destiny RuthahMeter ID: [id number]       POC Glucose Once [[id number]]  (Abnormal) Collected:  11/01/19 0821    Specimen:  Blood Updated:  11/01/19 0832     Glucose 272 mg/dL      Comment: : [id number] Dixon SarahMeter ID: [id number]       Comprehensive Metabolic Panel [[id number]]  (Abnormal) Collected:  11/01/19 0736    Specimen:  Blood Updated:  11/01/19 0823     Glucose 249 mg/dL      BUN 57 mg/dL      Creatinine 2.02 mg/dL      Sodium 136 mmol/L      Potassium 4.8 mmol/L      Chloride 94 mmol/L      CO2 29.0 mmol/L      Calcium 8.7 mg/dL      Total Protein 6.8 g/dL      Albumin 3.90 g/dL      ALT (SGPT) 16 U/L      AST (SGOT) 19 U/L      Alkaline Phosphatase 68 U/L      Total Bilirubin 0.3 mg/dL      eGFR Non African Amer 34 mL/min/1.73      Globulin 2.9 gm/dL      A/G Ratio 1.3 g/dL      BUN/Creatinine Ratio 28.2     Anion Gap 13.0 mmol/L     Narrative:       GFR Normal >60  Chronic Kidney Disease <60  Kidney Failure <15    CBC & Differential [[id number]] Collected:  11/01/19 0736    Specimen:  Blood Updated:  11/01/19 0803    Narrative:       The following orders were created for panel order CBC & Differential.  Procedure                               Abnormality         Status                     ---------                               -----------         ------                     CBC Auto Differential[[id number]]        Abnormal            Final result                 Please view results for these tests on the individual orders.    CBC Auto Differential [[id number]]  (Abnormal) Collected:  11/01/19 0736    Specimen:  Blood Updated:  11/01/19 0803     WBC 10.85 10*3/mm3      RBC 3.94 10*6/mm3      Hemoglobin 11.2 g/dL      Hematocrit 34.1 %      MCV 86.5 fL      MCH 28.4 pg      MCHC 32.8 g/dL      RDW 13.8 %      RDW-SD 43.6 fl      MPV 10.8 fL      Platelets 339 10*3/mm3      Neutrophil % 73.3 %      Lymphocyte % 13.5 %       Monocyte % 9.7 %      Eosinophil % 2.6 %      Basophil % 0.4 %      Immature Grans % 0.5 %      Neutrophils, Absolute 7.96 10*3/mm3      Lymphocytes, Absolute 1.47 10*3/mm3      Monocytes, Absolute 1.05 10*3/mm3      Eosinophils, Absolute 0.28 10*3/mm3      Basophils, Absolute 0.04 10*3/mm3      Immature Grans, Absolute 0.05 10*3/mm3      nRBC 0.0 /100 WBC     POC Glucose Once [[id number]]  (Normal) Collected:  11/01/19 0040    Specimen:  Blood Updated:  11/01/19 0052     Glucose 117 mg/dL      Comment: : [id number] [employer] TeresaMeter ID: [id number]       POC Glucose Once [[id number]]  (Normal) Collected:  10/31/19 2112    Specimen:  Blood Updated:  10/31/19 2123     Glucose 89 mg/dL      Comment: : [id number] [employer] TeresaMeter ID: [id number]       POC Glucose Once [[id number]]  (Abnormal) Collected:  10/31/19 2036    Specimen:  Blood Updated:  10/31/19 2048     Glucose 63 mg/dL      Comment: : [id number] [employer] TeresaMeter ID: [id number]       Blood Culture - Blood, Hand, Right [[id number]] Collected:  10/27/19 1815    Specimen:  Blood from Hand, Right Updated:  10/31/19 1845     Blood Culture No growth at 4 days    POC Glucose Once [[id number]]  (Normal) Collected:  10/31/19 1707    Specimen:  Blood Updated:  10/31/19 1723     Glucose 121 mg/dL      Comment: : [id number] Fox FoleyelaMeter ID: [id number]       Blood Culture - Blood, Hand, Right [[id number]] Collected:  10/27/19 1655    Specimen:  Blood from Hand, Right Updated:  10/31/19 1715     Blood Culture No growth at 4 days           Cultures:  Blood Culture   Date Value Ref Range Status   10/27/2019 No growth at 4 days  Preliminary   10/27/2019 No growth at 4 days  Preliminary       Radiology Data:    Imaging Results (Last 24 Hours)     Procedure Component Value Units Date/Time    XR Abdomen KUB [[id number]] Collected:  10/31/19 2125     Updated:  10/31/19 2128    Narrative:       EXAMINATION: XR ABDOMEN KUB-     10/31/2019 9:11 PM CDT    "  HISTORY: severe abdominal pain; N17.9-Acute kidney failure, unspecified;  I50.9-Heart failure, unspecified; N39.0-Urinary tract infection, site  not specified; Z74.09-Other reduced mobility     1 view abdomen compared with 01/25/2019.     Nonspecific bowel gas pattern with no sign of obstruction.     No abnormal calcification or unexplained foreign body is seen.     Mild degenerative lumbar spine changes.     Summary:  1. Nonspecific bowel gas pattern.  2. Moderate fecal material is noted within the colon.  This report was finalized on 10/31/2019 21:25 by Dr. Gomez Mcgill MD.          Allergies   Allergen Reactions   • Bactrim [Sulfamethoxazole-Trimethoprim] Other (See Comments)     \"RENAL FAILURE\"   • Vancomycin Itching       Scheduled meds:     aspirin 81 mg Oral Daily   bumetanide 1 mg Intravenous Daily   clindamycin 900 mg Intravenous Q8H   DULoxetine 60 mg Oral Daily   enoxaparin 30 mg Subcutaneous Q24H   gabapentin 100 mg Oral BID   insulin detemir 10 Units Subcutaneous Q12H   insulin lispro 0-14 Units Subcutaneous 4x Daily With Meals & Nightly   lactulose 20 g Oral BID   latanoprost 1 drop Both Eyes Nightly   losartan 100 mg Oral Q24H   methylnaltrexone 8 mg Subcutaneous Every Other Day   nystatin  Topical Q12H   pantoprazole 40 mg Oral Daily   rosuvastatin 40 mg Oral Daily   saccharomyces boulardii 250 mg Oral BID   sodium chloride 10 mL Intravenous Q12H       PRN meds:  •  acetaminophen **OR** acetaminophen **OR** acetaminophen  •  bisacodyl  •  dextrose  •  dextrose  •  diphenhydrAMINE  •  glucagon (human recombinant)  •  influenza vaccine  •  ipratropium-albuterol  •  magnesium hydroxide  •  oxyCODONE-acetaminophen  •  promethazine  •  sodium chloride  •  sodium chloride  •  traZODone    Assessment/Plan       Chronic diastolic congestive heart failure (CMS/HCC)    CAD (coronary artery disease)    Diabetes mellitus (CMS/HCC)    Type 2 diabetes mellitus with hyperglycemia, with long-term current use of " insulin (CMS/[facility])    Abdominal pain    Diabetic foot infection (CMS/[facility])    WANDER (acute kidney injury) (CMS/[facility])    Anemia due to chronic kidney disease    Morbid obesity with BMI of 40.0-44.9, adult (CMS/[facility])      Plan:  Acute on chronic renal failure.  Chronic stage III renal failure.  Renal functions worsened today. Nephrology consult.  Baseline creatinine 1.57.  On Bumex by nephrology for fluid overload.     Chest pain/hypertension/fluid overload.  Continue aspirin.  Lovenox prophylaxis.  Continue Cozaar.  Continue Bumex.  Chest x-ray-no acute changes.  CTA of the chest- small pleural effusions may be due to volume overload, no evidence of pulmonary embolus or pneumonia.  Echocardiogram-ejection fraction 56%, mild concentric hypertrophy, no significant abnormality cardiac valve.     Elevated d-dimer/chest pain/shortness of breath.    VQ scan - findings are most commonly associated with low probability for acute  pulmonary embolism.      COPD.  Duo nebs 4 times PRN.     Diabetes.  Insulin-dependent type 2.  Sliding scale.  Continue Levemir.  High sliding scale.  Diabetic educator consult.     Abdomen pain/constipation.  Continue Relistor.  Patient had Fleet enema last night.  KUB shows constipation.  CT scan abdomen pelvic- no evidence of acute abdominopelvic process.   2, subcutaneous edema in the anterior abdomen and in the dependent  portions of the hips and buttocks- findings could be due to volume  overload.     Chronic right leg wound/osteomyelitis/history of MRSA.  See Dr. Cerrato outpatient.  Patient had hyperbaric therapy for right lower foot wound.  Possible reaction vancomycin last night.  Plan to consult infectious disease for antibiotics.  Consult wound care.  Sacral decubitus precaution.  Doppler ultrasound of the right leg- there is no evidence of deep venous thrombosis or superficial thrombophlebitis of the right lower extremity.     Depression/neuropathy/chronic pain.  Continue Cymbalta.  Continue  Neurontin.  Benadryl PRN.  Trazodone as needed.     Reflux.  Protonix.  Zofran as needed.     Gross morbid obesity.  BMI is 45.  Diet and exercise has been discussed with patient.     Nutrition.  Probiotic.  Renal diet.     Deconditioning.  PT and OT consult.      Blood cultures-no growth for 3 days.     Discharge Plannin to 5 days.    Telly Rodriguez MD   19   3:29 PM

## 2019-11-01 NOTE — PLAN OF CARE
Problem: Patient Care Overview  Goal: Plan of Care Review  Outcome: Ongoing (interventions implemented as appropriate)  Severe abdominal pain of a 10/10 with some bleeding in his scrotum;  Applied cream to scrotum, called Dr who ordered stat KUB and then an enema and dulcolax suppository for constipation.  Results x 2 BM  By 0500;  Changed drsg to foot on right with betadine.  Glucose was low of 63--gave orange juice and PB and crackers at 2100;  levemir was refused at hs   11/01/19 0507   Coping/Psychosocial   Plan of Care Reviewed With patient   Plan of Care Review   Progress no change       Problem: Pain, Chronic (Adult)  Goal: Acceptable Pain/Comfort Level and Functional Ability  Outcome: Ongoing (interventions implemented as appropriate)   11/01/19 0507   Pain, Chronic (Adult)   Acceptable Pain/Comfort Level and Functional Ability making progress toward outcome       Problem: Fall Risk (Adult)  Goal: Absence of Fall  Outcome: Ongoing (interventions implemented as appropriate)   11/01/19 0507   Fall Risk (Adult)   Absence of Fall making progress toward outcome       Problem: Renal Failure/Kidney Injury, Acute (Adult)  Goal: Signs and Symptoms of Listed Potential Problems Will be Absent, Minimized or Managed (Renal Failure/Kidney Injury, Acute)  Outcome: Ongoing (interventions implemented as appropriate)   11/01/19 0507   Goal/Outcome Evaluation   Problems Assessed (Acute Renal Failure/Kidney Injury) all   Problems Present (ARF/Kidney Injury) electrolyte imbalance;situational response       Problem: Wound (Includes Pressure Injury) (Adult)  Goal: Signs and Symptoms of Listed Potential Problems Will be Absent, Minimized or Managed (Wound)  Outcome: Ongoing (interventions implemented as appropriate)   11/01/19 0507   Goal/Outcome Evaluation   Problems Assessed (Wound) all   Problems Present (Wound) pain;delayed wound healing;infection

## 2019-11-01 NOTE — PROGRESS NOTES
"INFECTIOUS DISEASES PROGRESS NOTE    Patient:  Erick Luong  YOB: 1955  MRN: [id number]   Admit date: 10/27/2019   Admitting Physician: Telly Rodriguez MD  Primary Care Physician: Del Shetty MD    Chief Complaint: \" still don't feel good\"       Interval History: Patient had abdominal pain last night.  Per nursing he had a KUB which showed that he was full of stool.  He has had an enema and had to moderate bowel movements.  He reports the abdominal pain is better.    When I asked him to be specific about his complaints, he complained of abdominal pain and foot pain.    He also said he has some nausea.  He was having some nausea at home.  He and his wife were attributing that to the vancomycin.  He has been off the vancomycin and still having some nausea but he cannot tell me if it is any worse or better    Patient states he is sleepy today as he did not sleep any last night.    Allergies:   Allergies   Allergen Reactions   • Bactrim [Sulfamethoxazole-Trimethoprim] Other (See Comments)     \"RENAL FAILURE\"   • Vancomycin Itching       Current Meds:     Current Facility-Administered Medications:   •  acetaminophen (TYLENOL) tablet 650 mg, 650 mg, Oral, Q4H PRN **OR** acetaminophen (TYLENOL) 160 MG/5ML solution 650 mg, 650 mg, Oral, Q4H PRN **OR** acetaminophen (TYLENOL) suppository 650 mg, 650 mg, Rectal, Q4H PRN, Abebe Garzon DO  •  aspirin EC tablet 81 mg, 81 mg, Oral, Daily, Abebe Garzon DO, 81 mg at 11/01/19 0919  •  bisacodyl (DULCOLAX) suppository 10 mg, 10 mg, Rectal, Daily PRN, Raquel Duncan, APRN, 10 mg at 11/01/19 0101  •  bumetanide (BUMEX) injection 1 mg, 1 mg, Intravenous, Daily, Stewart Alvarez, APRN, 1 mg at 11/01/19 0919  •  clindamycin (CLEOCIN) 900 mg in dextrose 5% 50 mL IVPB (premix), 900 mg, Intravenous, Q8H, Julia Adrian MD, Last Rate: 50 mL/hr at 11/01/19 1031, 900 mg at 11/01/19 1031  •  dextrose (D50W) 25 g/ 50mL Intravenous Solution 25 g, " 25 g, Intravenous, Q15 Min PRN, Telly Rodriguez MD  •  dextrose (GLUTOSE) oral gel 15 g, 15 g, Oral, Q15 Min PRN, Telly Rodriguez MD  •  diphenhydrAMINE (BENADRYL) injection 25 mg, 25 mg, Intravenous, Q6H PRN, Raquel Duncan, APRN, 25 mg at 10/29/19 2336  •  DULoxetine (CYMBALTA) DR capsule 60 mg, 60 mg, Oral, Daily, Abebe Garzon DO, 60 mg at 11/01/19 0919  •  enoxaparin (LOVENOX) syringe 30 mg, 30 mg, Subcutaneous, Q24H, Telly Rodriguez MD, 30 mg at 10/31/19 2128  •  gabapentin (NEURONTIN) capsule 100 mg, 100 mg, Oral, BID, Abebe Garzon DO, 100 mg at 11/01/19 0919  •  glucagon (human recombinant) (GLUCAGEN DIAGNOSTIC) injection 1 mg, 1 mg, Subcutaneous, Q15 Min PRN, Telly Rodriguez MD  •  Influenza Vac Subunit Quad (FLUCELVAX) injection 0.5 mL, 0.5 mL, Intramuscular, During Hospitalization, Abebe Garzon DO  •  insulin detemir (LEVEMIR) injection 10 Units, 10 Units, Subcutaneous, Q12H, Telly Rodriguez MD, 10 Units at 11/01/19 0920  •  insulin lispro (humaLOG) injection 0-14 Units, 0-14 Units, Subcutaneous, 4x Daily With Meals & Nightly, Telly Rodriguez MD, 12 Units at 11/01/19 1236  •  ipratropium-albuterol (DUO-NEB) nebulizer solution 3 mL, 3 mL, Nebulization, Q6H PRN, Abebe Garzon DO  •  lactulose solution 20 g, 20 g, Oral, BID, Abebe Garzon DO, 20 g at 11/01/19 0923  •  latanoprost (XALATAN) 0.005 % ophthalmic solution 1 drop, 1 drop, Both Eyes, Nightly, Telly Rodriguez MD, 1 drop at 10/31/19 2129  •  losartan (COZAAR) tablet 100 mg, 100 mg, Oral, Q24H, Abebe Garzon DO, 100 mg at 11/01/19 0919  •  magnesium hydroxide (MILK OF MAGNESIA) suspension 2400 mg/10mL 10 mL, 10 mL, Oral, Daily PRN, Raquel Duncan, APRN, 10 mL at 11/01/19 0100  •  methylnaltrexone (RELISTOR) injection 8 mg, 8 mg, Subcutaneous, Every Other Day, Abebe Garzon DO, 8 mg at 10/31/19 0948  •  nystatin (MYCOSTATIN) powder, , Topical, Q12H, Raquel Duncan, APRN, 1 application at 11/01/19  "922  •  oxyCODONE-acetaminophen (PERCOCET) 7.5-325 MG per tablet 1 tablet, 1 tablet, Oral, Q4H PRN, Telly Rodriguez MD, 1 tablet at 19  •  pantoprazole (PROTONIX) EC tablet 40 mg, 40 mg, Oral, Daily, Abebe Garzon DO, 40 mg at 19  •  promethazine (PHENERGAN) injection 12.5 mg, 12.5 mg, Intravenous, Q6H PRN, Raquel Duncan, APRN, 12.5 mg at 19 1025  •  rosuvastatin (CRESTOR) tablet 40 mg, 40 mg, Oral, Daily, Abebe Garzon DO, 40 mg at 19  •  saccharomyces boulardii (FLORASTOR) capsule 250 mg, 250 mg, Oral, BID, Abebe Garzon DO, 250 mg at 19  •  sodium chloride 0.9 % flush 10 mL, 10 mL, Intravenous, PRN, Reuben Tadeo MD  •  sodium chloride 0.9 % flush 10 mL, 10 mL, Intravenous, Q12H, Abebe Garzon DO, 10 mL at 19  •  sodium chloride 0.9 % flush 10 mL, 10 mL, Intravenous, PRN, Abebe Garzon DO  •  traZODone (DESYREL) tablet 25 mg, 25 mg, Oral, Nightly PRN, Abebe Garzon DO      Review of Systems   Constitutional: Negative for fever.   Gastrointestinal: Positive for constipation.       Objective     Vital Signs:  Temp (24hrs), Av.9 °F (36.6 °C), Min:97.6 °F (36.4 °C), Max:98.1 °F (36.7 °C)      /69 (BP Location: Right arm, Patient Position: Sitting)   Pulse 85   Temp 98.1 °F (36.7 °C) (Oral)   Resp 16   Ht 182.9 cm (72\")   Wt (!) 152 kg (334 lb)   SpO2 95%   BMI 45.30 kg/m²         Physical Exam   General: The patient is an obese gentleman sitting in a recliner in no acute distress.  Feet were on the floor when I came in however he reclined back when I came in.  HEENT: Sclera anicteric and noninjected  Chest: Well-healed median sternotomy scar  Abdomen: Obese, no focal, no rebound or guarding  Extremities:  edema bilateral lower extremities.  Wound is dressed in the right lower extremity and is dry and intact  Psych: Is pleasant and cooperative  Neuro: Alert and oriented, speech is clear.  He " does appear somewhat sleepy.    Line/IV site: PICC upper arm left, condition patent and no redness.  Peripheral IV right hand    Results Review:    I reviewed the patient's new clinical results.    Lab Results:  CBC:   Lab Results   Lab 10/27/19  1655 10/28/19  0507 10/29/19  0437 10/30/19  0549 10/31/19  0540 11/01/19  0736   WBC 13.46* 8.83 9.93 11.16* 10.53 10.85*   HEMOGLOBIN 10.6* 11.0* 11.4* 11.4* 11.3* 11.2*   HEMATOCRIT 31.6* 33.6* 33.8* 33.5* 33.7* 34.1*   PLATELETS 358 324 361 356 397 339         CMP:   Lab Results   Lab 10/30/19  0549 10/31/19  0540 11/01/19  0736   SODIUM 136 137 136   POTASSIUM 4.5 4.2 4.8   CHLORIDE 94* 93* 94*   CO2 26.0 29.0 29.0   BUN 47* 58* 57*   CREATININE 2.05* 2.13* 2.02*   CALCIUM 9.1 9.2 8.7   BILIRUBIN 0.3 0.3 0.3   ALK PHOS 83 82 68   ALT (SGPT) 15 16 16   AST (SGOT) 17 15 19   GLUCOSE 207* 104* 249*         Culture Results:        Microbiology Results Abnormal     Procedure Component Value - Date/Time    Blood Culture - Blood, Hand, Right [[id number]] Collected:  10/27/19 1815    Lab Status:  Preliminary result Specimen:  Blood from Hand, Right Updated:  10/31/19 1845     Blood Culture No growth at 4 days    Blood Culture - Blood, Hand, Right [[id number]] Collected:  10/27/19 1655    Lab Status:  Preliminary result Specimen:  Blood from Hand, Right Updated:  10/31/19 1715     Blood Culture No growth at 4 days          September cultures per Dr. Cerrato  Specimen Information: Foot, Right; Tissue           Tissue Culture     Lab   Light growth (2+) Staphylococcus aureus, MRSA Abnormal    [facility]     Methicillin resistant Staphylococcus aureus, Patient may be an isolation risk.          Gram Stain    Lab   Occasional WBCs seen  [facility]       No organisms seen [facility]             Susceptibility               Staphylococcus aureus, MRSA       MATT       Clindamycin 0.25 ug/ml Susceptible       Erythromycin 4 ug/ml Intermediate       Inducible Clindamycin Resistance NEG ug/ml  Negative       Oxacillin >=4 ug/ml Resistant       Penicillin G >=0.5 ug/ml Resistant       Rifampin <=0.5 ug/ml Susceptible       Tetracycline <=1 ug/ml Susceptible       Trimethoprim + Sulfamethoxazole <=10 ug/ml Susceptible       Vancomycin <=0.5 ug/ml Susceptible                 Susceptibility Comments      Staphylococcus aureus, MRSA   This isolate does not demonstrate inducible clindamycin resistance in vitro.          Specimen Collected: 09/19/19 12:06 Last Resulted: 09/22/19 10:57 Order Details View Encounter Lab                   Radiology:   Imaging Results (Last 72 Hours)     Procedure Component Value Units Date/Time    XR Abdomen KUB [[id number]] Collected:  10/31/19 2125     Updated:  10/31/19 2128    Narrative:       EXAMINATION: XR ABDOMEN KUB-     10/31/2019 9:11 PM CDT     HISTORY: severe abdominal pain; N17.9-Acute kidney failure, unspecified;  I50.9-Heart failure, unspecified; N39.0-Urinary tract infection, site  not specified; Z74.09-Other reduced mobility     1 view abdomen compared with 01/25/2019.     Nonspecific bowel gas pattern with no sign of obstruction.     No abnormal calcification or unexplained foreign body is seen.     Mild degenerative lumbar spine changes.     Summary:  1. Nonspecific bowel gas pattern.  2. Moderate fecal material is noted within the colon.  This report was finalized on 10/31/2019 21:25 by Dr. Gomez Mcgill MD.    US Venous Doppler Lower Extremity Right (duplex) [[id number]] Collected:  10/30/19 1610     Updated:  10/30/19 1614    Narrative:       History: Right lower extremity swelling       Impression:       Impression: There is no evidence of deep venous thrombosis or  superficial thrombophlebitis of the right lower extremity.     Comments: Right lower extremity venous duplex exam was performed using  color Doppler flow, Doppler wave form analysis, and grayscale imaging,  with and without compression. There is no evidence of deep venous  thrombosis of the common  femoral, superficial femoral, popliteal,  posterior tibial, and peroneal veins. There is no thrombus identified in  the saphenofemoral junction or the greater saphenous vein.     This report was finalized on 10/30/2019 16:11 by Dr. Talon Santacruz MD.    NM Lung Ventilation Perfusion [[id number]] Collected:  10/30/19 1102     Updated:  10/30/19 1105    Narrative:       EXAMINATION: NM LUNG VENTILATION PERFUSION- 10/30/2019 11:02 AM CDT     HISTORY: Acute onset chest pain     Comparison: Chest x-ray 10/29/2019, nuclear medicine VQ scan 08/29/2016      Radiopharmaceutical: 9.5 mCi of Xenon-133 for the ventilation study and  5.46 mCi Tc 99m MAA for the perfusion study.      Technique: Following inhalation of the aerosolized radiopharmaceutical,  the ventilation study was performed with images of the thorax being  obtained in posterior projection. Thereafter, the perfusion study was  performed following the injection of radiolabeled MAA particles with  images of the thorax obtained in 8 projections.      FINDINGS:    No pleural based, wedge-shape, matched or mismatched segmental  ventilation perfusion defects are demonstrated.         Impression:       Findings are most commonly associated with low probability for acute  pulmonary embolism.     This report was finalized on 10/30/2019 11:02 by Dr. Justen Figueroa MD.    XR Chest 1 View [[id number]] Collected:  10/30/19 0654     Updated:  10/30/19 0659    Narrative:       Exam: XR CHEST 1 VW-     Indication: Shortness of breath     Comparison: 10/27/2019     Findings:     The cardiac silhouette is within normal limits. Left-sided PICC tip  overlies the cavoatrial junction. No pleural effusion, pneumothorax, or  focal consolidation. Median sternotomy wires. Cervical spine hardware is  noted.       Impression:          No acute findings.  This report was finalized on 10/30/2019 06:56 by Dr. Santana Figueredo MD.          Assessment/Plan     Active Hospital Problems    Diagnosis    • Morbid obesity with BMI of 40.0-44.9, adult (CMS/[facility])   • WANDER (acute kidney injury) (CMS/[facility])   • Anemia due to chronic kidney disease   • Diabetic foot infection (CMS/[facility])   • Abdominal pain   • Type 2 diabetes mellitus with hyperglycemia, with long-term current use of insulin (CMS/[facility])     hold insulin the am of procedure     • CAD (coronary artery disease)   • Chronic diastolic congestive heart failure (CMS/[facility])   • Diabetes mellitus (CMS/[facility])       IMPRESSION:  1. Diabetic foot infection with ulcer and underlying osteomyelitis the right foot-followed by Dr. Cerrato as an outpatient.  Patient had been on intravenous vancomycin for this osteomyelitis since October 8 and plan was for 6-week course of therapy.  He has developed a reaction to vancomycin with itching and shortness of breath and a rapid response was called.  It is now listed as an allergy.  He was switched to IV clindamycin October 30  2. Poorly controlled diabetes-blood glucose in the 400s yesterday-did receive Solu-Medrol during the rapid response  3. Chronic kidney disease stage III with baseline creatinine 1.3 per nephrology-worsening  4. Constipation- per attending.    5. Hypertension  6. Congestive heart failure        RECOMMENDATION:   Continue clindamycin.  Patient may still have vancomycin on board as level was 25.6 on the morning of October 29 and the patient received a dose at 2122 that same night.  Warned regarding risk of C. Difficile.    Per Dr. Cerrato's note patient started antibiotic therapy on October 8 and plan was for 6-week course.  Patient was also going to hyperbaric oxygen therapy.  He was okay with change to clindamycin or doxycycline.    We will follow peripherally.  Dr. Brothers available over weekend if needed    .    Julia Adrian MD  11/01/19  1:46 PM

## 2019-11-01 NOTE — PROGRESS NOTES
Continued Stay Note   [place]     Patient Name: Erick Luong  MRN: [id number]  Today's Date: 11/1/2019    Admit Date: 10/27/2019    Discharge Plan     Row Name 11/01/19 1515       Plan    Plan  Home vs LTAC    Plan Comments Rec'd a referral for LTAC from MD, mainly for iv abx since it changed to q 8. LTAC can take pending insurance approval. Spoke with pt's spouse, Joan, about this. She does not think pt will agree to LTAC because he wants to go home. Explained to her the abx is q 8 now and since she is the one that gives the medication and she also works, this will be difficult to do. She is going to talk to pt. If pt goes home on a different iv abx than what he originally had at home prior to admission, he will require new orders to arrange through [facility].     Update at 1600: Spoke with pt's spouse again and she says pt is willing to think about LTAC. She is okay with a representative speaking with him. Domenica from [facility] will speak with pt today.        Discharge Codes    No documentation.             MEJIA Carreno

## 2019-11-02 LAB
ALBUMIN SERPL-MCNC: 3.8 G/DL (ref 3.5–5.2)
ALBUMIN/GLOB SERPL: 1.4 G/DL
ALP SERPL-CCNC: 73 U/L (ref 39–117)
ALT SERPL W P-5'-P-CCNC: 15 U/L (ref 1–41)
ANION GAP SERPL CALCULATED.3IONS-SCNC: 12 MMOL/L (ref 5–15)
AST SERPL-CCNC: 15 U/L (ref 1–40)
BASOPHILS # BLD AUTO: 0.05 10*3/MM3 (ref 0–0.2)
BASOPHILS NFR BLD AUTO: 0.6 % (ref 0–1.5)
BILIRUB SERPL-MCNC: 0.4 MG/DL (ref 0.2–1.2)
BUN BLD-MCNC: 51 MG/DL (ref 8–23)
BUN/CREAT SERPL: 28.3 (ref 7–25)
CALCIUM SPEC-SCNC: 8.9 MG/DL (ref 8.6–10.5)
CHLORIDE SERPL-SCNC: 97 MMOL/L (ref 98–107)
CO2 SERPL-SCNC: 28 MMOL/L (ref 22–29)
CREAT BLD-MCNC: 1.8 MG/DL (ref 0.76–1.27)
DEPRECATED RDW RBC AUTO: 43 FL (ref 37–54)
EOSINOPHIL # BLD AUTO: 0.61 10*3/MM3 (ref 0–0.4)
EOSINOPHIL NFR BLD AUTO: 7.9 % (ref 0.3–6.2)
ERYTHROCYTE [DISTWIDTH] IN BLOOD BY AUTOMATED COUNT: 13.7 % (ref 12.3–15.4)
GFR SERPL CREATININE-BSD FRML MDRD: 38 ML/MIN/1.73
GLOBULIN UR ELPH-MCNC: 2.7 GM/DL
GLUCOSE BLD-MCNC: 275 MG/DL (ref 65–99)
GLUCOSE BLDC GLUCOMTR-MCNC: 274 MG/DL (ref 70–130)
GLUCOSE BLDC GLUCOMTR-MCNC: 319 MG/DL (ref 70–130)
GLUCOSE BLDC GLUCOMTR-MCNC: 319 MG/DL (ref 70–130)
GLUCOSE BLDC GLUCOMTR-MCNC: 356 MG/DL (ref 70–130)
HCT VFR BLD AUTO: 33.7 % (ref 37.5–51)
HGB BLD-MCNC: 11 G/DL (ref 13–17.7)
IMM GRANULOCYTES # BLD AUTO: 0.03 10*3/MM3 (ref 0–0.05)
IMM GRANULOCYTES NFR BLD AUTO: 0.4 % (ref 0–0.5)
LYMPHOCYTES # BLD AUTO: 1.38 10*3/MM3 (ref 0.7–3.1)
LYMPHOCYTES NFR BLD AUTO: 17.8 % (ref 19.6–45.3)
MCH RBC QN AUTO: 28.1 PG (ref 26.6–33)
MCHC RBC AUTO-ENTMCNC: 32.6 G/DL (ref 31.5–35.7)
MCV RBC AUTO: 86.2 FL (ref 79–97)
MONOCYTES # BLD AUTO: 0.9 10*3/MM3 (ref 0.1–0.9)
MONOCYTES NFR BLD AUTO: 11.6 % (ref 5–12)
NEUTROPHILS # BLD AUTO: 4.8 10*3/MM3 (ref 1.7–7)
NEUTROPHILS NFR BLD AUTO: 61.7 % (ref 42.7–76)
NRBC BLD AUTO-RTO: 0 /100 WBC (ref 0–0.2)
PLATELET # BLD AUTO: 307 10*3/MM3 (ref 140–450)
PMV BLD AUTO: 10.6 FL (ref 6–12)
POTASSIUM BLD-SCNC: 5.1 MMOL/L (ref 3.5–5.2)
PROT SERPL-MCNC: 6.5 G/DL (ref 6–8.5)
RBC # BLD AUTO: 3.91 10*6/MM3 (ref 4.14–5.8)
SODIUM BLD-SCNC: 137 MMOL/L (ref 136–145)
WBC NRBC COR # BLD: 7.77 10*3/MM3 (ref 3.4–10.8)

## 2019-11-02 PROCEDURE — 63710000001 INSULIN LISPRO (HUMAN) PER 5 UNITS: Performed by: FAMILY MEDICINE

## 2019-11-02 PROCEDURE — 85025 COMPLETE CBC W/AUTO DIFF WBC: CPT | Performed by: FAMILY MEDICINE

## 2019-11-02 PROCEDURE — 82962 GLUCOSE BLOOD TEST: CPT

## 2019-11-02 PROCEDURE — 63710000001 INSULIN DETEMIR PER 5 UNITS: Performed by: FAMILY MEDICINE

## 2019-11-02 PROCEDURE — 25010000002 PROMETHAZINE PER 50 MG: Performed by: NURSE PRACTITIONER

## 2019-11-02 PROCEDURE — 97110 THERAPEUTIC EXERCISES: CPT

## 2019-11-02 PROCEDURE — 80053 COMPREHEN METABOLIC PANEL: CPT | Performed by: FAMILY MEDICINE

## 2019-11-02 PROCEDURE — 25010000002 ENOXAPARIN PER 10 MG: Performed by: FAMILY MEDICINE

## 2019-11-02 PROCEDURE — 25010000002 METHYLNALTREXONE 12 MG/0.6ML SOLUTION: Performed by: INTERNAL MEDICINE

## 2019-11-02 PROCEDURE — 97116 GAIT TRAINING THERAPY: CPT

## 2019-11-02 RX ORDER — ONDANSETRON 2 MG/ML
4 INJECTION INTRAMUSCULAR; INTRAVENOUS EVERY 6 HOURS PRN
Status: DISCONTINUED | OUTPATIENT
Start: 2019-11-02 | End: 2019-11-05

## 2019-11-02 RX ADMIN — SODIUM CHLORIDE, PRESERVATIVE FREE 10 ML: 5 INJECTION INTRAVENOUS at 08:24

## 2019-11-02 RX ADMIN — NYSTATIN: 100000 POWDER TOPICAL at 21:13

## 2019-11-02 RX ADMIN — OXYCODONE HYDROCHLORIDE AND ACETAMINOPHEN 1 TABLET: 7.5; 325 TABLET ORAL at 04:28

## 2019-11-02 RX ADMIN — PROMETHAZINE HYDROCHLORIDE 12.5 MG: 25 INJECTION INTRAMUSCULAR; INTRAVENOUS at 04:28

## 2019-11-02 RX ADMIN — GABAPENTIN 100 MG: 100 CAPSULE ORAL at 21:07

## 2019-11-02 RX ADMIN — DULOXETINE HYDROCHLORIDE 60 MG: 30 CAPSULE, DELAYED RELEASE ORAL at 08:21

## 2019-11-02 RX ADMIN — INSULIN LISPRO 20 UNITS: 100 INJECTION, SOLUTION INTRAVENOUS; SUBCUTANEOUS at 12:01

## 2019-11-02 RX ADMIN — NYSTATIN: 100000 POWDER TOPICAL at 08:20

## 2019-11-02 RX ADMIN — Medication 250 MG: at 21:07

## 2019-11-02 RX ADMIN — INSULIN DETEMIR 10 UNITS: 100 INJECTION, SOLUTION SUBCUTANEOUS at 08:18

## 2019-11-02 RX ADMIN — ROSUVASTATIN CALCIUM 40 MG: 20 TABLET, FILM COATED ORAL at 08:23

## 2019-11-02 RX ADMIN — INSULIN LISPRO 16 UNITS: 100 INJECTION, SOLUTION INTRAVENOUS; SUBCUTANEOUS at 21:08

## 2019-11-02 RX ADMIN — ASPIRIN 81 MG: 81 TABLET ORAL at 08:21

## 2019-11-02 RX ADMIN — METHYLNALTREXONE BROMIDE 8 MG: 12 INJECTION, SOLUTION SUBCUTANEOUS at 08:22

## 2019-11-02 RX ADMIN — GABAPENTIN 100 MG: 100 CAPSULE ORAL at 08:20

## 2019-11-02 RX ADMIN — INSULIN DETEMIR 15 UNITS: 100 INJECTION, SOLUTION SUBCUTANEOUS at 21:07

## 2019-11-02 RX ADMIN — CLINDAMYCIN IN 5 PERCENT DEXTROSE 900 MG: 18 INJECTION, SOLUTION INTRAVENOUS at 18:36

## 2019-11-02 RX ADMIN — ENOXAPARIN SODIUM 30 MG: 30 INJECTION SUBCUTANEOUS at 21:08

## 2019-11-02 RX ADMIN — Medication 250 MG: at 08:21

## 2019-11-02 RX ADMIN — PANTOPRAZOLE SODIUM 40 MG: 40 TABLET, DELAYED RELEASE ORAL at 21:08

## 2019-11-02 RX ADMIN — CLINDAMYCIN IN 5 PERCENT DEXTROSE 900 MG: 18 INJECTION, SOLUTION INTRAVENOUS at 03:28

## 2019-11-02 RX ADMIN — PROMETHAZINE HYDROCHLORIDE 12.5 MG: 25 INJECTION INTRAMUSCULAR; INTRAVENOUS at 10:11

## 2019-11-02 RX ADMIN — OXYCODONE HYDROCHLORIDE AND ACETAMINOPHEN 1 TABLET: 7.5; 325 TABLET ORAL at 17:00

## 2019-11-02 RX ADMIN — LACTULOSE 20 G: 20 SOLUTION ORAL at 08:20

## 2019-11-02 RX ADMIN — OXYCODONE HYDROCHLORIDE AND ACETAMINOPHEN 1 TABLET: 7.5; 325 TABLET ORAL at 21:17

## 2019-11-02 RX ADMIN — INSULIN LISPRO 16 UNITS: 100 INJECTION, SOLUTION INTRAVENOUS; SUBCUTANEOUS at 17:00

## 2019-11-02 RX ADMIN — LACTULOSE 20 G: 20 SOLUTION ORAL at 21:07

## 2019-11-02 RX ADMIN — POLYETHYLENE GLYCOL 3350 17 G: 17 POWDER, FOR SOLUTION ORAL at 08:21

## 2019-11-02 RX ADMIN — CLINDAMYCIN IN 5 PERCENT DEXTROSE 900 MG: 18 INJECTION, SOLUTION INTRAVENOUS at 11:49

## 2019-11-02 RX ADMIN — INSULIN LISPRO 12 UNITS: 100 INJECTION, SOLUTION INTRAVENOUS; SUBCUTANEOUS at 08:18

## 2019-11-02 RX ADMIN — LOSARTAN POTASSIUM 100 MG: 50 TABLET, FILM COATED ORAL at 08:21

## 2019-11-02 RX ADMIN — OXYCODONE HYDROCHLORIDE AND ACETAMINOPHEN 1 TABLET: 7.5; 325 TABLET ORAL at 10:11

## 2019-11-02 RX ADMIN — SODIUM CHLORIDE, PRESERVATIVE FREE 10 ML: 5 INJECTION INTRAVENOUS at 22:01

## 2019-11-02 RX ADMIN — BUMETANIDE 1 MG: 0.25 INJECTION INTRAMUSCULAR; INTRAVENOUS at 08:23

## 2019-11-02 NOTE — PLAN OF CARE
Problem: Patient Care Overview  Goal: Plan of Care Review  Outcome: Ongoing (interventions implemented as appropriate)   11/02/19 0242   Coping/Psychosocial   Plan of Care Reviewed With patient   Plan of Care Review   Progress improving   OTHER   Outcome Summary Medicated for pain and nausea x1. BG was 447, insulin given per orders and Dr. Blount notified. IV abx per order. Pt resting well. Voiding per urinal, VSS, will cont to monitor.      Goal: Discharge Needs Assessment  Outcome: Ongoing (interventions implemented as appropriate)   10/27/19 2024 10/29/19 1509   Discharge Needs Assessment   Readmission Within the Last 30 Days --  no previous admission in last 30 days   Concerns to be Addressed --  adjustment to diagnosis/illness;care coordination/care conferences;discharge planning   Patient/Family Anticipates Transition to --  home with help/services;home with family   Patient/Family Anticipated Services at Transition --  home health care   Transportation Anticipated --  family or friend will provide   Outpatient/Agency/Support Group Needs --  skilled nursing facility   Discharge Facility/Level of Care Needs --  nursing facility, skilled   Discharge Coordination/Progress --  Pt lives at home with his spouse. He currently gets iv abx through [facility] and is followed by [facility] . He plans to return home at d/c with these services. Will follow.   Disability   Equipment Currently Used at Home orthotic device --        Problem: Pain, Chronic (Adult)  Goal: Acceptable Pain/Comfort Level and Functional Ability  Outcome: Ongoing (interventions implemented as appropriate)   11/02/19 0242   Pain, Chronic (Adult)   Acceptable Pain/Comfort Level and Functional Ability making progress toward outcome       Problem: Fall Risk (Adult)  Goal: Absence of Fall  Outcome: Ongoing (interventions implemented as appropriate)   11/02/19 0242   Fall Risk (Adult)   Absence of Fall achieves outcome       Problem: Renal Failure/Kidney  Injury, Acute (Adult)  Goal: Signs and Symptoms of Listed Potential Problems Will be Absent, Minimized or Managed (Renal Failure/Kidney Injury, Acute)  Outcome: Ongoing (interventions implemented as appropriate)   11/02/19 0242   Goal/Outcome Evaluation   Problems Assessed (Acute Renal Failure/Kidney Injury) all   Problems Present (ARF/Kidney Injury) situational response       Problem: Wound (Includes Pressure Injury) (Adult)  Goal: Signs and Symptoms of Listed Potential Problems Will be Absent, Minimized or Managed (Wound)  Outcome: Ongoing (interventions implemented as appropriate)   11/02/19 0242   Goal/Outcome Evaluation   Problems Assessed (Wound) all   Problems Present (Wound) delayed wound healing;pain;infection

## 2019-11-02 NOTE — PROGRESS NOTES
[facility] Medicine Services  INPATIENT PROGRESS NOTE    Length of Stay: 6  Date of Admission: 10/27/2019  Primary Care Physician: Del Shetty MD    Subjective   Chief Complaint: Acute on chronic renal failure.  Osteomyelitis.  Chest pain.  Shortness of breath.    HPI   Patient had good night sleep last night.  Sugars still running very high.  Plan to increase Levemir.  Patient denies any chest pain or shortness of breath.  Net -3000 urine output.    Review of Systems   Constitutional: Positive for activity change, appetite change and fatigue. Negative for chills and fever.   HENT: Negative for hearing loss, nosebleeds, tinnitus and trouble swallowing.    Eyes: Negative for visual disturbance.   Respiratory: Positive for shortness of breath and wheezing. Negative for cough and chest tightness.    Cardiovascular: Negative for chest pain, palpitations and leg swelling.   Gastrointestinal: Positive for abdominal pain and nausea. Negative for abdominal distention, blood in stool, constipation, diarrhea and vomiting.   Endocrine: Negative for cold intolerance, heat intolerance, polydipsia, polyphagia and polyuria.   Genitourinary: Negative for decreased urine volume, difficulty urinating, dysuria, flank pain, frequency and hematuria.   Musculoskeletal: Positive for arthralgias, back pain, gait problem and myalgias. Negative for joint swelling.   Skin: Positive for wound. Negative for rash.        Right foot.   Allergic/Immunologic: Negative for immunocompromised state.   Neurological: Positive for weakness. Negative for dizziness, syncope, light-headedness and headaches.   Hematological: Negative for adenopathy. Does not bruise/bleed easily.   Psychiatric/Behavioral: Negative for confusion and sleep disturbance. The patient is not nervous/anxious.    All pertinent negatives and positives are as above. All other systems have been reviewed and are negative unless otherwise stated.      Objective    Temp:  [97.5 °F (36.4 °C)-98.1 °F (36.7 °C)] 97.5 °F (36.4 °C)  Heart Rate:  [79-90] 79  Resp:  [16] 16  BP: (114-134)/(48-60) 126/55    Intake/Output Summary (Last 24 hours) at 11/2/2019 1255  Last data filed at 11/2/2019 1036  Gross per 24 hour   Intake 360 ml   Output 3435 ml   Net -3075 ml     Physical Exam  Constitutional: He is oriented to person, place, and time. He appears well-developed.   HENT:   Head: Normocephalic and atraumatic.   Eyes: Conjunctivae are normal. Pupils are equal, round, and reactive to light.   Neck: Neck supple. No JVD present. No thyromegaly present.   Cardiovascular: Normal rate, regular rhythm, normal heart sounds and intact distal pulses. Exam reveals no gallop and no friction rub.   No murmur heard.  Pulmonary/Chest: Effort normal. No respiratory distress.  Diminished breath sound bilateral, clear.  He has no rales. He exhibits no tenderness.   Diminished breath sound bilateral.  Patient is not requiring oxygen.   Abdominal: Soft. Bowel sounds are normal. He exhibits no distension. There is no tenderness. There is no rebound and no guarding.   Gross morbid obesity.   Musculoskeletal: He exhibits edema. He exhibits no tenderness or deformity.   +2 pitting edema lower extremities   Lymphadenopathy:     He has no cervical adenopathy.   Neurological: He is alert and oriented to person, place, and time. He displays normal reflexes. No cranial nerve deficit. He exhibits abnormal muscle tone. Coordination abnormal.   Skin: Skin is warm and dry. Capillary refill takes 2 to 3 seconds. No rash noted.   Right foot wound.  Covered gauze.   Psychiatric: He has a normal mood and affect. His behavior is normal. Thought content normal.   Nursing note and vitals reviewed.  Results Review:  Lab Results (last 24 hours)     Procedure Component Value Units Date/Time    POC Glucose Once [[id number]]  (Abnormal) Collected:  11/02/19 1150    Specimen:  Blood Updated:  11/02/19 1201      Glucose 356 mg/dL      Comment: : [id number] Miracle Del Cid  LMeter ID: [id number]       POC Glucose Once [[id number]]  (Abnormal) Collected:  11/02/19 0740    Specimen:  Blood Updated:  11/02/19 0752     Glucose 274 mg/dL      Comment: : [id number] Miracle Del Cid  LMeter ID: [id number]       Comprehensive Metabolic Panel [[id number]]  (Abnormal) Collected:  11/02/19 0628    Specimen:  Blood Updated:  11/02/19 0711     Glucose 275 mg/dL      BUN 51 mg/dL      Creatinine 1.80 mg/dL      Sodium 137 mmol/L      Potassium 5.1 mmol/L      Chloride 97 mmol/L      CO2 28.0 mmol/L      Calcium 8.9 mg/dL      Total Protein 6.5 g/dL      Albumin 3.80 g/dL      ALT (SGPT) 15 U/L      AST (SGOT) 15 U/L      Alkaline Phosphatase 73 U/L      Total Bilirubin 0.4 mg/dL      eGFR Non African Amer 38 mL/min/1.73      Globulin 2.7 gm/dL      A/G Ratio 1.4 g/dL      BUN/Creatinine Ratio 28.3     Anion Gap 12.0 mmol/L     Narrative:       GFR Normal >60  Chronic Kidney Disease <60  Kidney Failure <15    CBC & Differential [[id number]] Collected:  11/02/19 0628    Specimen:  Blood Updated:  11/02/19 0649    Narrative:       The following orders were created for panel order CBC & Differential.  Procedure                               Abnormality         Status                     ---------                               -----------         ------                     CBC Auto Differential[[id number]]        Abnormal            Final result                 Please view results for these tests on the individual orders.    CBC Auto Differential [[id number]]  (Abnormal) Collected:  11/02/19 0628    Specimen:  Blood Updated:  11/02/19 0649     WBC 7.77 10*3/mm3      RBC 3.91 10*6/mm3      Hemoglobin 11.0 g/dL      Hematocrit 33.7 %      MCV 86.2 fL      MCH 28.1 pg      MCHC 32.6 g/dL      RDW 13.7 %      RDW-SD 43.0 fl      MPV 10.6 fL      Platelets 307 10*3/mm3      Neutrophil % 61.7 %      Lymphocyte % 17.8 %      Monocyte % 11.6 %       "Eosinophil % 7.9 %      Basophil % 0.6 %      Immature Grans % 0.4 %      Neutrophils, Absolute 4.80 10*3/mm3      Lymphocytes, Absolute 1.38 10*3/mm3      Monocytes, Absolute 0.90 10*3/mm3      Eosinophils, Absolute 0.61 10*3/mm3      Basophils, Absolute 0.05 10*3/mm3      Immature Grans, Absolute 0.03 10*3/mm3      nRBC 0.0 /100 WBC     POC Glucose Once [[id number]]  (Abnormal) Collected:  11/01/19 2244    Specimen:  Blood Updated:  11/01/19 2255     Glucose 392 mg/dL      Comment: : [id number] [employer]aMeter ID: [id number]       POC Glucose Once [[id number]]  (Abnormal) Collected:  11/01/19 1949    Specimen:  Blood Updated:  11/01/19 2000     Glucose 480 mg/dL      Comment: : [id number] [employer]aMeter ID: [id number]       Blood Culture - Blood, Hand, Right [[id number]] Collected:  10/27/19 1815    Specimen:  Blood from Hand, Right Updated:  11/01/19 1845     Blood Culture No growth at 5 days    Blood Culture - Blood, Hand, Right [[id number]] Collected:  10/27/19 1655    Specimen:  Blood from Hand, Right Updated:  11/01/19 1715     Blood Culture No growth at 5 days    POC Glucose Once [[id number]]  (Abnormal) Collected:  11/01/19 1657    Specimen:  Blood Updated:  11/01/19 1708     Glucose 395 mg/dL      Comment: : [id number] Dixon SarahMeter ID: [id number]       POC Glucose Once [[id number]]  (Abnormal) Collected:  11/01/19 1656    Specimen:  Blood Updated:  11/01/19 1708     Glucose 434 mg/dL      Comment: : [id number] Dixon SarahMeter ID: [id number]              Cultures:  Blood Culture   Date Value Ref Range Status   10/27/2019 No growth at 5 days  Final   10/27/2019 No growth at 5 days  Final       Radiology Data:    Imaging Results (Last 24 Hours)     ** No results found for the last 24 hours. **          Allergies   Allergen Reactions   • Bactrim [Sulfamethoxazole-Trimethoprim] Other (See Comments)     \"RENAL FAILURE\"   • Vancomycin Itching       Scheduled meds:     aspirin 81 mg Oral " Daily   bumetanide 1 mg Intravenous Daily   clindamycin 900 mg Intravenous Q8H   DULoxetine 60 mg Oral Daily   enoxaparin 30 mg Subcutaneous Q24H   gabapentin 100 mg Oral BID   insulin detemir 10 Units Subcutaneous Q12H   insulin lispro 0-24 Units Subcutaneous 4x Daily With Meals & Nightly   lactulose 20 g Oral BID   latanoprost 1 drop Both Eyes Nightly   losartan 100 mg Oral Q24H   methylnaltrexone 8 mg Subcutaneous Every Other Day   nystatin  Topical Q12H   pantoprazole 40 mg Oral Nightly   polyethylene glycol 17 g Oral Daily   rosuvastatin 40 mg Oral Daily   saccharomyces boulardii 250 mg Oral BID   sodium chloride 10 mL Intravenous Q12H       PRN meds:  •  acetaminophen **OR** acetaminophen **OR** acetaminophen  •  bisacodyl  •  dextrose  •  dextrose  •  diphenhydrAMINE  •  glucagon (human recombinant)  •  influenza vaccine  •  ipratropium-albuterol  •  magnesium hydroxide  •  oxyCODONE-acetaminophen  •  promethazine  •  sodium chloride  •  sodium chloride  •  traZODone    Assessment/Plan       Chronic diastolic congestive heart failure (CMS/[facility])    CAD (coronary artery disease)    Diabetes mellitus (CMS/[facility])    Type 2 diabetes mellitus with hyperglycemia, with long-term current use of insulin (CMS/[facility])    Abdominal pain    Diabetic foot infection (CMS/[facility])    WANDER (acute kidney injury) (CMS/[facility])    Anemia due to chronic kidney disease    Morbid obesity with BMI of 40.0-44.9, adult (CMS/[facility])      Plan:  Acute on chronic renal failure. Chronic stage III renal failure.  Renal functions improving. Nephrology consult.  Baseline creatinine 1.57.  On Bumex by nephrology for fluid overload.     Chest pain/hypertension/fluid overload.  Continue aspirin.  Lovenox prophylaxis.  Continue Cozaar.  Continue Bumex.  Chest x-ray-no acute changes.  CTA of the chest- small pleural effusions may be due to volume overload, no evidence of pulmonary embolus or pneumonia.  Echocardiogram-ejection fraction 56%, mild concentric  hypertrophy, no significant abnormality cardiac valve.    Chronic right leg wound/osteomyelitis/history of MRSA.  See Dr. Cerrato outpatient.  Patient had hyperbaric therapy for right lower foot wound.  Possible reaction vancomycin last night.  Plan to consult infectious disease for antibiotics.  Consult wound care.  Sacral decubitus precaution.  Doppler ultrasound of the right leg- there is no evidence of deep venous thrombosis or superficial thrombophlebitis of the right lower extremity.     Elevated d-dimer/chest pain/shortness of breath.    VQ scan - findings are most commonly associated with low probability for acute  pulmonary embolism.      COPD.  Duo nebs 4 times PRN.     Diabetes.  Insulin-dependent type 2.  Sliding scale.  Increase Levemir.  High sliding scale.  Diabetic educator consult.     Abdomen pain/constipation.  Continue Relistor.    KUB shows constipation.  MiraLAX daily.  CT scan abdomen pelvic- no evidence of acute abdominopelvic process.   2, subcutaneous edema in the anterior abdomen and in the dependent  portions of the hips and buttocks- findings could be due to volume  overload.     Depression/neuropathy/chronic pain.  Continue Cymbalta.  Continue Neurontin.  Benadryl PRN.  Trazodone as needed.     Reflux.  Protonix.  Zofran as needed.     Gross morbid obesity.  BMI is 45.  Diet and exercise has been discussed with patient.     Nutrition.  Probiotic.  Renal diet.     Deconditioning.  PT and OT consult.      Blood cultures-no growth for 5 days.     Discharge Plannin to 4 days. LTAC placement pending.     Telly Rodriguez MD   19   12:55 PM

## 2019-11-02 NOTE — PROGRESS NOTES
Nephrology ([facility]) Progress Note      Patient:  Erick Luong  YOB: 1955  Date of Service: 11/2/2019  MRN: [id number]   Acct: [id number]   Primary Care Physician: Del Shetty MD  Advance Directive:   Code Status and Medical Interventions:   Ordered at: 10/27/19 2021     Level Of Support Discussed With:    Patient     Code Status:    CPR     Medical Interventions (Level of Support Prior to Arrest):    Full     Admit Date: 10/27/2019       Hospital Day: 6  Referring Provider: Abebe Garzon DO      Patient personally seen and examined.  Complete chart including Consults, Notes, Operative Reports, Labs, Cardiology, and Radiology studies reviewed as able.         Subjective:  Erick Luong is a 64 y.o. male  whom we were consulted for acute kidney injury.  Baseline chronic kidney disease stage 3, baseline creatinine 1.3.  Has followed with our office on an irregular basis in the past.  History of type 2 diabetes, hypertension, congestive heart failure.  Undergoing treatment for chronic right foot wound; has PICC line in place and has been getting IV Vancomycin.  Last few days has developed increased edema, dyspnea. Unable to lay flat at night.  Diminished urine output.  Hospital course remarkable for initiation of Bumex drip with good output since it was started.      Today, notes better night.  Diuresing well with bumex.  Denies fever, chills, chest pain, nausea or vomiting.  Family present    Allergies:  Bactrim [sulfamethoxazole-trimethoprim] and Vancomycin    Home Meds:  Medications Prior to Admission   Medication Sig Dispense Refill Last Dose   • aspirin 81 MG EC tablet Take 81 mg by mouth Daily.   10/27/2019 at Unknown time   • bumetanide (BUMEX) 2 MG tablet Take 1 tablet by mouth 2 (Two) Times a Day As Needed (edema). (Patient taking differently: Take 2 mg by mouth 2 (Two) Times a Day.) 30 tablet 2 10/27/2019 at Unknown time   • DULoxetine (CYMBALTA) 60 MG  capsule Take 60 mg by mouth Daily.   10/27/2019 at Unknown time   • gabapentin (NEURONTIN) 100 MG capsule Take 100 mg by mouth 2 (Two) Times a Day.   10/27/2019 at Unknown time   • insulin glargine (LANTUS) 100 UNIT/ML injection Inject 20 Units under the skin into the appropriate area as directed Every Night.   10/27/2019 at Unknown time   • insulin regular (humuLIN R) 500 UNIT/ML CONCENTRATED injection Inject 100 Units under the skin 3 (Three) Times a Day Before Meals. Packaging states 100 units with regular meals; 120 units with large meals or 360 units daily   10/27/2019 at Unknown time   • lactulose (CHRONULAC) 10 GM/15ML solution Take 20 g by mouth 3 (Three) Times a Day As Needed.   10/27/2019 at Unknown time   • latanoprost (XALATAN) 0.005 % ophthalmic solution Administer 1 drop to both eyes Every Night.   10/27/2019 at Unknown time   • losartan (COZAAR) 100 MG tablet Take 100 mg by mouth Daily.   10/27/2019 at Unknown time   • ondansetron ODT (ZOFRAN-ODT) 8 MG disintegrating tablet Take 8 mg by mouth Every 6 (Six) Hours As Needed for Nausea or Vomiting.   10/27/2019 at Unknown time   • oxyCODONE-acetaminophen (PERCOCET)  MG per tablet Take 1 tablet by mouth 2 (Two) Times a Day.   10/27/2019 at Unknown time   • pantoprazole (PROTONIX) 40 MG EC tablet Take 1 tablet by mouth Daily. 90 tablet 3 10/27/2019 at Unknown time   • rosuvastatin (CRESTOR) 40 MG tablet Take 40 mg by mouth Daily.   10/27/2019 at Unknown time   • saccharomyces boulardii (FLORASTOR) 250 MG capsule Take 250 mg by mouth 2 (Two) Times a Day.   10/27/2019 at Unknown time   • traZODone (DESYREL) 50 MG tablet Take 25-50 mg by mouth At Night As Needed for Sleep.   10/26/2019 at Unknown time       Medicines:  Current Facility-Administered Medications   Medication Dose Route Frequency Provider Last Rate Last Dose   • acetaminophen (TYLENOL) tablet 650 mg  650 mg Oral Q4H PRN Abebe Garzon, DO        Or   • acetaminophen (TYLENOL) 160  MG/5ML solution 650 mg  650 mg Oral Q4H PRN Abebe Garzon DO        Or   • acetaminophen (TYLENOL) suppository 650 mg  650 mg Rectal Q4H PRN Abebe Garzon DO       • aspirin EC tablet 81 mg  81 mg Oral Daily Abebe Garzon DO   81 mg at 11/02/19 0821   • bisacodyl (DULCOLAX) suppository 10 mg  10 mg Rectal Daily PRN Raquel Duncan, APRN   10 mg at 11/01/19 0101   • bumetanide (BUMEX) injection 1 mg  1 mg Intravenous Daily Stewart Alvarez, APRN   1 mg at 11/02/19 0823   • clindamycin (CLEOCIN) 900 mg in dextrose 5% 50 mL IVPB (premix)  900 mg Intravenous Q8H Julia Adrian MD 50 mL/hr at 11/02/19 1149 900 mg at 11/02/19 1149   • dextrose (D50W) 25 g/ 50mL Intravenous Solution 25 g  25 g Intravenous Q15 Min PRN Telly Rodriguez MD       • dextrose (GLUTOSE) oral gel 15 g  15 g Oral Q15 Min PRN Telly Rodriguez MD       • diphenhydrAMINE (BENADRYL) injection 25 mg  25 mg Intravenous Q6H PRN Raquel Duncan, APRN   25 mg at 10/29/19 2336   • DULoxetine (CYMBALTA) DR capsule 60 mg  60 mg Oral Daily Abebe Garzon DO   60 mg at 11/02/19 0821   • enoxaparin (LOVENOX) syringe 30 mg  30 mg Subcutaneous Q24H Telly Rodriguez MD   30 mg at 11/01/19 2032   • gabapentin (NEURONTIN) capsule 100 mg  100 mg Oral BID Abebe Garzon DO   100 mg at 11/02/19 0820   • glucagon (human recombinant) (GLUCAGEN DIAGNOSTIC) injection 1 mg  1 mg Subcutaneous Q15 Min PRN Telly Rodriguez MD       • Influenza Vac Subunit Quad (FLUCELVAX) injection 0.5 mL  0.5 mL Intramuscular During Hospitalization Abebe Garzon DO       • insulin detemir (LEVEMIR) injection 15 Units  15 Units Subcutaneous Q12H Rodriguez, Telly C, MD       • insulin lispro (humaLOG) injection 0-24 Units  0-24 Units Subcutaneous 4x Daily With Meals & Nightly Telly Rodriguez MD   20 Units at 11/02/19 1201   • ipratropium-albuterol (DUO-NEB) nebulizer solution 3 mL  3 mL Nebulization Q6H PRN Abebe Garzon DO       • lactulose solution  20 g  20 g Oral BID Abebe Garzon DO   20 g at 11/02/19 0820   • latanoprost (XALATAN) 0.005 % ophthalmic solution 1 drop  1 drop Both Eyes Nightly Telly Rodriguez MD   1 drop at 11/01/19 2245   • losartan (COZAAR) tablet 100 mg  100 mg Oral Q24H Abebe Garzon DO   100 mg at 11/02/19 0821   • magnesium hydroxide (MILK OF MAGNESIA) suspension 2400 mg/10mL 10 mL  10 mL Oral Daily PRN Raquel Duncan, APRN   10 mL at 11/01/19 0100   • methylnaltrexone (RELISTOR) injection 8 mg  8 mg Subcutaneous Every Other Day Abebe Garzon DO   8 mg at 11/02/19 0822   • nystatin (MYCOSTATIN) powder   Topical Q12H Raquel Duncan, APRN       • oxyCODONE-acetaminophen (PERCOCET) 7.5-325 MG per tablet 1 tablet  1 tablet Oral Q4H PRN Telly Rodriguez MD   1 tablet at 11/02/19 1011   • pantoprazole (PROTONIX) EC tablet 40 mg  40 mg Oral Nightly Telly Rodriguez MD   40 mg at 11/01/19 2028   • polyethylene glycol 3350 powder (packet)  17 g Oral Daily Telly Rodriguez MD   17 g at 11/02/19 0821   • promethazine (PHENERGAN) injection 12.5 mg  12.5 mg Intravenous Q6H PRN Raquel Duncan, APRN   12.5 mg at 11/02/19 1011   • rosuvastatin (CRESTOR) tablet 40 mg  40 mg Oral Daily Abebe Garzon DO   40 mg at 11/02/19 0823   • saccharomyces boulardii (FLORASTOR) capsule 250 mg  250 mg Oral BID Abebe Garzon DO   250 mg at 11/02/19 0821   • sodium chloride 0.9 % flush 10 mL  10 mL Intravenous PRN Reuben Tadeo MD       • sodium chloride 0.9 % flush 10 mL  10 mL Intravenous Q12H Abebe Garzon DO   10 mL at 11/02/19 0824   • sodium chloride 0.9 % flush 10 mL  10 mL Intravenous PRN Abebe Garzon,        • traZODone (DESYREL) tablet 25 mg  25 mg Oral Nightly PRN Abebe Garzon, DO           Past Medical History:  Past Medical History:   Diagnosis Date   • Arthritis    • CHF (congestive heart failure) (CMS/[facility])    • Coronary artery disease    • Diabetes mellitus (CMS/[facility])    • Hyperlipidemia    •  Hypertension    • Myocardial infarction (CMS/[facility])    • Pancreatitis    • Renal disorder    • Sleep apnea with use of continuous positive airway pressure (CPAP)        Past Surgical History:  Past Surgical History:   Procedure Laterality Date   • ABDOMINAL SURGERY     • APPENDECTOMY     • BACK SURGERY     • CARDIAC SURGERY     • CATARACT EXTRACTION     • CERVICAL SPINE SURGERY     • COLONOSCOPY N/A 1/31/2017    Normal exam repeat in 5 years   • COLONOSCOPY N/A 2/11/2019    Procedure: COLONOSCOPY WITH ANESTHESIA;  Surgeon: Tyrell Smith MD;  Location: [facility] ENDOSCOPY;  Service: Gastroenterology   • COLONOSCOPY W/ POLYPECTOMY  03/04/2014    Hyperplastic polyp   • CORONARY ARTERY BYPASS GRAFT  10/2015   • ENDOSCOPY  04/13/2011    Gastritis with hemorrhage   • ENDOSCOPY N/A 5/5/2017    Normal exam   • ENDOSCOPY N/A 2/11/2019    Procedure: ESOPHAGOGASTRODUODENOSCOPY WITH ANESTHESIA;  Surgeon: Tyrell Smith MD;  Location: [facility] ENDOSCOPY;  Service: Gastroenterology   • INCISION AND DRAINAGE OF WOUND Left 09/2007    spider bite       Family History  Family History   Problem Relation Age of Onset   • Colon cancer Father    • Heart disease Father    • Colon cancer Sister    • Colon polyps Sister    • Alzheimer's disease Mother    • Coronary artery disease Sister    • Coronary artery disease Sister        Social History  Social History     Socioeconomic History   • Marital status:      Spouse name: Not on file   • Number of children: Not on file   • Years of education: Not on file   • Highest education level: Not on file   Tobacco Use   • Smoking status: Never Smoker   • Smokeless tobacco: Never Used   • Tobacco comment: smoked in highschool   Substance and Sexual Activity   • Alcohol use: No   • Drug use: No   • Sexual activity: Defer         Review of Systems:  History obtained from chart review and the patient  General ROS: No fever or chills  Respiratory ROS: No cough, shortness of breath,  wheezing  Cardiovascular ROS: No chest pain or palpitations  Gastrointestinal ROS: No abdominal pain or melena  Genito-Urinary ROS: No dysuria or hematuria    Objective:  Patient Vitals for the past 24 hrs:   BP Temp Temp src Pulse Resp SpO2 Weight   11/02/19 1036 126/55 97.5 °F (36.4 °C) Oral 79 16 95 % --   11/02/19 0211 134/49 97.9 °F (36.6 °C) Oral 86 16 97 % --   11/01/19 2139 119/60 97.8 °F (36.6 °C) Oral 84 16 95 % (!) 150 kg (331 lb)   11/01/19 1506 114/48 98.1 °F (36.7 °C) Oral 90 16 98 % --       Intake/Output Summary (Last 24 hours) at 11/2/2019 1302  Last data filed at 11/2/2019 1036  Gross per 24 hour   Intake 360 ml   Output 3435 ml   Net -3075 ml     General: awake/alert   Chest:  clear to auscultation bilaterally without respiratory distress  CVS: regular rate and rhythm  Abdominal: soft, nontender, positive bowel sounds  Extremities: ble edema  Skin: warm and dry without rash      Labs:  Results from last 7 days   Lab Units 11/02/19  0628 11/01/19  0736 10/31/19  0540   WBC 10*3/mm3 7.77 10.85* 10.53   HEMOGLOBIN g/dL 11.0* 11.2* 11.3*   HEMATOCRIT % 33.7* 34.1* 33.7*   PLATELETS 10*3/mm3 307 339 397         Results from last 7 days   Lab Units 11/02/19 0628 11/01/19  0736 10/31/19  0540   SODIUM mmol/L 137 136 137   POTASSIUM mmol/L 5.1 4.8 4.2   CHLORIDE mmol/L 97* 94* 93*   CO2 mmol/L 28.0 29.0 29.0   BUN mg/dL 51* 57* 58*   CREATININE mg/dL 1.80* 2.02* 2.13*   CALCIUM mg/dL 8.9 8.7 9.2   BILIRUBIN mg/dL 0.4 0.3 0.3   ALK PHOS U/L 73 68 82   ALT (SGPT) U/L 15 16 16   AST (SGOT) U/L 15 19 15   GLUCOSE mg/dL 275* 249* 104*       Radiology:   Imaging Results (Last 72 Hours)     Procedure Component Value Units Date/Time    XR Abdomen KUB [[id number]] Collected:  10/31/19 2125     Updated:  10/31/19 2128    Narrative:       EXAMINATION: XR ABDOMEN KUB-     10/31/2019 9:11 PM CDT     HISTORY: severe abdominal pain; N17.9-Acute kidney failure, unspecified;  I50.9-Heart failure, unspecified;  N39.0-Urinary tract infection, site  not specified; Z74.09-Other reduced mobility     1 view abdomen compared with 01/25/2019.     Nonspecific bowel gas pattern with no sign of obstruction.     No abnormal calcification or unexplained foreign body is seen.     Mild degenerative lumbar spine changes.     Summary:  1. Nonspecific bowel gas pattern.  2. Moderate fecal material is noted within the colon.  This report was finalized on 10/31/2019 21:25 by Dr. Gomez Mcgill MD.    US Venous Doppler Lower Extremity Right (duplex) [[id number]] Collected:  10/30/19 1610     Updated:  10/30/19 1614    Narrative:       History: Right lower extremity swelling       Impression:       Impression: There is no evidence of deep venous thrombosis or  superficial thrombophlebitis of the right lower extremity.     Comments: Right lower extremity venous duplex exam was performed using  color Doppler flow, Doppler wave form analysis, and grayscale imaging,  with and without compression. There is no evidence of deep venous  thrombosis of the common femoral, superficial femoral, popliteal,  posterior tibial, and peroneal veins. There is no thrombus identified in  the saphenofemoral junction or the greater saphenous vein.     This report was finalized on 10/30/2019 16:11 by Dr. Talon Santacruz MD.          Culture:  Blood Culture   Date Value Ref Range Status   10/27/2019 No growth at 5 days  Final   10/27/2019 No growth at 5 days  Final         Assessment   Acute kidney injury  Chronic kidney disease stage 3  Type 2 diabetes with nephropathy  Essential hypertension  Right foot wound  Volume overload  Anemia      Plan:  Continue intermittent IV Bumex as creatinine improved and continues to be net negative, continue current dosing  Reviewed diet and medication adjustments with patient and family  Monitor labs  Discussed with patient, nursing, family      Willy Arteaga MD  11/2/2019  1:02 PM

## 2019-11-02 NOTE — PLAN OF CARE
Problem: Patient Care Overview  Goal: Plan of Care Review  Outcome: Ongoing (interventions implemented as appropriate)   11/01/19 1800   Coping/Psychosocial   Plan of Care Reviewed With patient   Plan of Care Review   Progress no change   OTHER   Outcome Summary Pain and nausea meds x 1, dressing changed per order, resting in chair all day, refused physical therapy, increasing hyperglycemia today, MD aware on rounds, insulin changes made.     Goal: Individualization and Mutuality  Outcome: Ongoing (interventions implemented as appropriate)    Goal: Discharge Needs Assessment  Outcome: Ongoing (interventions implemented as appropriate)      Problem: Pain, Chronic (Adult)  Goal: Acceptable Pain/Comfort Level and Functional Ability  Outcome: Ongoing (interventions implemented as appropriate)      Problem: Fall Risk (Adult)  Goal: Identify Related Risk Factors and Signs and Symptoms  Outcome: Outcome(s) achieved Date Met: 11/01/19    Goal: Absence of Fall  Outcome: Ongoing (interventions implemented as appropriate)      Problem: Renal Failure/Kidney Injury, Acute (Adult)  Goal: Signs and Symptoms of Listed Potential Problems Will be Absent, Minimized or Managed (Renal Failure/Kidney Injury, Acute)  Outcome: Ongoing (interventions implemented as appropriate)      Problem: Wound (Includes Pressure Injury) (Adult)  Goal: Signs and Symptoms of Listed Potential Problems Will be Absent, Minimized or Managed (Wound)  Outcome: Ongoing (interventions implemented as appropriate)

## 2019-11-02 NOTE — PLAN OF CARE
Problem: Patient Care Overview  Goal: Plan of Care Review  Outcome: Ongoing (interventions implemented as appropriate)   11/02/19 0242 11/02/19 1504 11/02/19 1604   Coping/Psychosocial   Plan of Care Reviewed With --  patient --    Plan of Care Review   Progress improving --  --    OTHER   Outcome Summary --  --  IV antibiotic continued. Po pain med and IV phenergan given X 1. Up in chair several times this shift.Dressing to right foot per order. Levemir insulin increased to 15 units.     Goal: Individualization and Mutuality  Outcome: Ongoing (interventions implemented as appropriate)   10/28/19 1844 10/31/19 1455 11/02/19 1604   Individualization   Patient Specific Preferences Prefers door closed --  --    Patient Specific Goals (Include Timeframe) --  wound healing --    Patient Specific Interventions --  ensure safety, dsg change as ordered --    Mutuality/Individual Preferences   What Anxieties, Fears, Concerns, or Questions Do You Have About Your Care? --  --  Concerned about going to [facility] or going home   What Information Would Help Us Give You More Personalized Care? --  --  Pt has a PICC line that he came from home with.   How Would You and/or Your Support Person Like to Participate in Your Care? --  --  Keep pt and spouse updated on treatment plan   Mutuality/Individual Preferences   How to Address Anxieties/Fears --  --   is working on having pt placed in [facility]     Goal: Discharge Needs Assessment  Outcome: Ongoing (interventions implemented as appropriate)   10/27/19 2024 10/29/19 1509   Discharge Needs Assessment   Readmission Within the Last 30 Days --  no previous admission in last 30 days   Concerns to be Addressed --  adjustment to diagnosis/illness;care coordination/care conferences;discharge planning   Patient/Family Anticipates Transition to --  home with help/services;home with family   Patient/Family Anticipated Services at Transition --  home health care   Transportation  Anticipated --  family or friend will provide   Outpatient/Agency/Support Group Needs --  skilled nursing facility   Discharge Facility/Level of Care Needs --  nursing facility, skilled   Discharge Coordination/Progress --  Pt lives at home with his spouse. He currently gets iv abx through [facility] and is followed by [facility]. He plans to return home at d/c with these services. Will follow.   Disability   Equipment Currently Used at Home orthotic device --        Problem: Pain, Chronic (Adult)  Goal: Acceptable Pain/Comfort Level and Functional Ability  Outcome: Ongoing (interventions implemented as appropriate)   11/02/19 1604   Pain, Chronic (Adult)   Acceptable Pain/Comfort Level and Functional Ability making progress toward outcome       Problem: Fall Risk (Adult)  Goal: Absence of Fall  Outcome: Ongoing (interventions implemented as appropriate)   11/02/19 1604   Fall Risk (Adult)   Absence of Fall making progress toward outcome       Problem: Renal Failure/Kidney Injury, Acute (Adult)  Goal: Signs and Symptoms of Listed Potential Problems Will be Absent, Minimized or Managed (Renal Failure/Kidney Injury, Acute)  Outcome: Ongoing (interventions implemented as appropriate)   11/02/19 0242   Goal/Outcome Evaluation   Problems Assessed (Acute Renal Failure/Kidney Injury) all   Problems Present (ARF/Kidney Injury) situational response       Problem: Wound (Includes Pressure Injury) (Adult)  Goal: Signs and Symptoms of Listed Potential Problems Will be Absent, Minimized or Managed (Wound)  Outcome: Ongoing (interventions implemented as appropriate)   11/02/19 0242   Goal/Outcome Evaluation   Problems Assessed (Wound) all   Problems Present (Wound) delayed wound healing;pain;infection

## 2019-11-02 NOTE — THERAPY TREATMENT NOTE
Acute Care - Physical Therapy Treatment Note  [facility]     Patient Name: Erick Luong  : 1955  MRN: [id number]  Today's Date: 2019  Onset of Illness/Injury or Date of Surgery: 10/27/19          Admit Date: 10/27/2019    Visit Dx:    ICD-10-CM ICD-9-CM   1. WANDER (acute kidney injury) (CMS/[facility]) N17.9 584.9   2. Congestive heart failure, unspecified HF chronicity, unspecified heart failure type (CMS/[facility]) I50.9 428.0   3. Urinary tract infection without hematuria, site unspecified N39.0 599.0   4. Decreased mobility and endurance Z74.09 780.99     Patient Active Problem List   Diagnosis   • Lower abdominal pain   • SIRS (systemic inflammatory response syndrome) (CMS/[facility])   • Fever, unknown origin   • Viral gastroenteritis   • Chronic diastolic congestive heart failure (CMS/[facility])   • CAD (coronary artery disease)   • Diabetes mellitus (CMS/[facility])   • Essential hypertension   • Sleep apnea   • Arthritis   • Mixed hyperlipidemia   • Shortness of breath   • Acute pancreatitis   • Chest pain, non-cardiac   • Obesity, unspecified obesity severity, unspecified obesity type   • HTN (hypertension), benign   • Epigastric pain   • Type 2 diabetes mellitus with hyperglycemia, with long-term current use of insulin (CMS/[facility])   • Pleuritic chest pain   • Slow transit constipation   • Nausea and vomiting   • CKD (chronic kidney disease)   • Nonsmoker   • Orthostatic hypotension   • Abdominal pain   • Constipation   • Ischemic colitis (CMS/[facility])   • Altered mental status   • Diabetic foot infection (CMS/[facility])   • WANDER (acute kidney injury) (CMS/[facility])   • Anemia due to chronic kidney disease   • Morbid obesity with BMI of 40.0-44.9, adult (CMS/[facility])   • Infection due to enterococcus   • Osteomyelitis of fifth toe of right foot (CMS/[facility])   • CHF (congestive heart failure) (CMS/[facility])       Therapy Treatment    Rehabilitation Treatment Summary     Row Name 19 1504             Treatment Time/Intention    Discipline  physical  therapy assistant  -AF      Document Type  therapy note (daily note)  -AF2      Subjective Information  no complaints  -AF2      Mode of Treatment  physical therapy  -AF2      Existing Precautions/Restrictions  fall  -AF      Recorded by [AF] Faviola Chung, PTA 11/02/19 1515  [AF2] Faviola Chung, PTA 11/02/19 1530      Row Name 11/02/19 1504             Bed Mobility Assessment/Treatment    Supine-Sit Vining (Bed Mobility)  conditional independence  -AF      Assistive Device (Bed Mobility)  head of bed elevated;bed rails  -AF      Recorded by [AF] Faviola Chung, PTA 11/02/19 1530      Row Name 11/02/19 1504             Sit-Stand Transfer    Sit-Stand Vining (Transfers)  conditional independence  -AF      Recorded by [AF] Faviola Chung, PTA 11/02/19 1530      Row Name 11/02/19 1504             Gait/Stairs Assessment/Training    Vining Level (Gait)  stand by assist  -AF      Distance in Feet (Gait)  242'  -AF      Deviations/Abnormal Patterns (Gait)  gait speed decreased  -AF      Recorded by [AF] Faviola Chung, PTA 11/02/19 1530      Row Name 11/02/19 1504             Therapeutic Exercise    Lower Extremity Range of Motion (Therapeutic Exercise)  ankle dorsiflexion/plantar flexion, bilateral;knee flexion/extension, bilateral;hip abduction/adduction, bilateral;hip flexion/extension, bilateral  -AF      Exercise Type (Therapeutic Exercise)  AROM (active range of motion)  -AF2      Position (Therapeutic Exercise)  seated  -AF2      Sets/Reps (Therapeutic Exercise)  20  -AF2      Recorded by [AF] Faviola Chung, PTA 11/02/19 1530  [AF2] Faviola Chung, PTA 11/02/19 1533      Row Name 11/02/19 1504             Static Sitting Balance    Level of Vining (Unsupported Sitting, Static Balance)  independent  -AF      Sitting Position (Unsupported Sitting, Static Balance)  sitting on edge of bed  -AF      Recorded by [AF] Faviola Chung, PTA 11/02/19 1533      Row Name 11/02/19  1504             Positioning and Restraints    Pre-Treatment Position  in bed  -AF      Post Treatment Position  bed  -AF      In Bed  fowlers;call light within reach;encouraged to call for assist;with family/caregiver  -AF      Recorded by [AF] EmmetMuniraFaviola, PTA 11/02/19 1533      Row Name 11/02/19 1504             Pain Scale: Numbers Pre/Post-Treatment    Pain Scale: Numbers, Pretreatment  7/10  -AF      Pain Scale: Numbers, Post-Treatment  7/10  -AF      Pain Location - Side  Right  -AF      Pain Location - Orientation  lower  -AF      Pain Location  extremity  -AF      Recorded by [AF] SheldonMuniraFaviola, PTA 11/02/19 1530      Row Name                Wound 07/22/19 2120 Right posterior foot diabetic/neuropathic ulceration    Wound - Properties Group Date first assessed: 07/22/19 [KB] Time first assessed: 2120 [KB] Side: Right [KB] Orientation: posterior [KB] Location: foot [KB] Type: diabetic/neuropathic ulceration [KB] Recorded by:  [KB] Samantha Israel RN 07/23/19 0008      User Key  (r) = Recorded By, (t) = Taken By, (c) = Cosigned By    Initials Name Effective Dates Discipline    AF Emmet, Faviola, PTA 02/11/19 -  PT    Samantha Edwards RN 12/31/18 -  Nurse          Wound 07/22/19 2120 Right posterior foot diabetic/neuropathic ulceration (Active)   Dressing Appearance no drainage 11/2/2019  7:42 AM   Closure WALKER 11/2/2019  7:42 AM   Base dressing in place, unable to visualize 11/2/2019  7:42 AM   Periwound intact;dry 11/2/2019  7:42 AM           Physical Therapy Education     Title: PT OT SLP Therapies (Done)     Topic: Physical Therapy (Done)     Point: Mobility training (Done)     Learning Progress Summary           Patient AcceptanceMEDHAT VU, DU by OLIVERIO at 11/2/2019  3:04 PM    Comment:  benefits of increased amb    AcceptanceMEDHAT VU by AISHWARYA at 10/31/2019  2:18 PM    Comment:  Pt. educated on increasing gait speed/stride length to prevent risk of fall    AcceptanceMEDHAT VU by AISHWARYA at 10/30/2019 10:17 AM     Comment:  Pt. educated on importance of maintaining AROM DF R foot needed for gait safety    Acceptance, E, VU by AISHWARYA at 10/29/2019 10:55 AM    Comment:  Pt. educated on importance on ambulating with reduced WB on R foot to  decrease pain    Acceptance, E,D, NR,VU by DE at 10/28/2019  9:46 AM    Comment:  pt educated on safety and proper techinue for functional mobility. pt intructed on proper body mechanics to maximize function and adhere to current safety precautions. pt needs reinforcement with proper use of RW to decrease weight bearing through R foot                   Point: Body mechanics (Done)     Learning Progress Summary           Patient Acceptance, E, VU by AISHWARYA at 10/31/2019  2:18 PM    Comment:  Pt. educated on increasing gait speed/stride length to prevent risk of fall    Acceptance, E, VU by AISHWARYA at 10/30/2019 10:17 AM    Comment:  Pt. educated on importance of maintaining AROM DF R foot needed for gait safety    Acceptance, E, VU by AISHWARYA at 10/29/2019 10:55 AM    Comment:  Pt. educated on importance on ambulating with reduced WB on R foot to  decrease pain    Acceptance, E,D, NR,VU by DE at 10/28/2019  9:46 AM    Comment:  pt educated on safety and proper techinue for functional mobility. pt intructed on proper body mechanics to maximize function and adhere to current safety precautions. pt needs reinforcement with proper use of RW to decrease weight bearing through R foot                   Point: Precautions (Done)     Learning Progress Summary           Patient Acceptance, E, VU by AISHWARYA at 10/31/2019  2:18 PM    Comment:  Pt. educated on increasing gait speed/stride length to prevent risk of fall    Acceptance, E, VU by AISHWARYA at 10/30/2019 10:17 AM    Comment:  Pt. educated on importance of maintaining AROM DF R foot needed for gait safety    Acceptance, E, VU by AISHWARYA at 10/29/2019 10:55 AM    Comment:  Pt. educated on importance on ambulating with reduced WB on R foot to  decrease pain    Acceptance, E,D,  ADDI WU by DE at 10/28/2019  9:46 AM    Comment:  pt educated on safety and proper techinue for functional mobility. pt intructed on proper body mechanics to maximize function and adhere to current safety precautions. pt needs reinforcement with proper use of RW to decrease weight bearing through R foot                               User Key     Initials Effective Dates Name Provider Type Discipline    AF 02/11/19 -  Faviola Chung PTA Physical Therapy Assistant PT    JW 09/18/19 -  Melly Méndez, PTA Student PTA Student PT    DE 10/10/19 -  Sudarshan Hogue, PT Student PT Student PT                PT Recommendation and Plan     Plan of Care Reviewed With: patient  Outcome Summary: Pt. treatment completed. Pt. was independent for bed mobility and to stand. His daughter donned his walking boot (R) and tennis shoe (L) and he amb 242' w/ SBA. Back in room, pt. performed B LE AROM exercises x20. He then got back into [employer] independenty. Pt. would benefit from increased B LE strengthening and continued amb.  Outcome Measures     Row Name 11/02/19 4453             How much help from another person do you currently need...    Turning from your back to your side while in flat bed without using bedrails?  4  -AF      Moving from lying on back to sitting on the side of a flat bed without bedrails?  3  -AF      Moving to and from a bed to a chair (including a wheelchair)?  4  -AF      Standing up from a chair using your arms (e.g., wheelchair, bedside chair)?  4  -AF      Climbing 3-5 steps with a railing?  3  -AF      To walk in hospital room?  4  -AF      AM-PAC 6 Clicks Score (PT)  22  -AF         Functional Assessment    Outcome Measure Options  AM-PAC 6 Clicks Basic Mobility (PT)  -AF        User Key  (r) = Recorded By, (t) = Taken By, (c) = Cosigned By    Initials Name Provider Type    AF Faviola Chung PTA Physical Therapy Assistant         Time Calculation:   PT Charges     Row Name 11/02/19 3818              Time Calculation    Start Time  1504  -AF      Stop Time  1528  -AF      Time Calculation (min)  24 min  -AF      PT Received On  11/02/19  -AF      PT Goal Re-Cert Due Date  11/07/19  -AF         Timed Charges    12546 - PT Therapeutic Exercise Minutes  12  -AF      80459 - Gait Training Minutes   12  -AF        User Key  (r) = Recorded By, (t) = Taken By, (c) = Cosigned By    Initials Name Provider Type    AF Day, Faviola, PTA Physical Therapy Assistant        Therapy Charges for Today     Code Description Service Date Service Provider Modifiers Qty    93755335889 HC PT THER PROC EA 15 MIN 11/2/2019 Sheldon Faviola, PTA GP 1    43680289324 HC GAIT TRAINING EA 15 MIN 11/2/2019 Munira Chunggail, PTA GP 1          PT G-Codes  Outcome Measure Options: AM-PAC 6 Clicks Basic Mobility (PT)  AM-PAC 6 Clicks Score (PT): 22  AM-PAC 6 Clicks Score (OT): 20    Faviola Sheldon PTA  11/2/2019

## 2019-11-02 NOTE — PLAN OF CARE
Problem: Patient Care Overview  Goal: Plan of Care Review  Outcome: Ongoing (interventions implemented as appropriate)   11/02/19 4344   Coping/Psychosocial   Plan of Care Reviewed With patient   OTHER   Outcome Summary Pt. treatment completed. Pt. was independent for bed mobility and to stand. His daughter donned his walking boot (R) and tennis shoe (L) and he amb 242' w/ SBA. Back in room, pt. performed B LE AROM exercises x20. He then got back into fowlers independenty. Pt. would benefit from increased B LE strengthening and continued amb.

## 2019-11-03 LAB
ALBUMIN SERPL-MCNC: 3.6 G/DL (ref 3.5–5.2)
ALBUMIN/GLOB SERPL: 1.2 G/DL
ALP SERPL-CCNC: 72 U/L (ref 39–117)
ALT SERPL W P-5'-P-CCNC: 11 U/L (ref 1–41)
ANION GAP SERPL CALCULATED.3IONS-SCNC: 11 MMOL/L (ref 5–15)
AST SERPL-CCNC: 13 U/L (ref 1–40)
BASOPHILS # BLD AUTO: 0.04 10*3/MM3 (ref 0–0.2)
BASOPHILS NFR BLD AUTO: 0.5 % (ref 0–1.5)
BILIRUB SERPL-MCNC: 0.4 MG/DL (ref 0.2–1.2)
BUN BLD-MCNC: 40 MG/DL (ref 8–23)
BUN/CREAT SERPL: 26.8 (ref 7–25)
CALCIUM SPEC-SCNC: 9.1 MG/DL (ref 8.6–10.5)
CHLORIDE SERPL-SCNC: 98 MMOL/L (ref 98–107)
CO2 SERPL-SCNC: 27 MMOL/L (ref 22–29)
CREAT BLD-MCNC: 1.49 MG/DL (ref 0.76–1.27)
DEPRECATED RDW RBC AUTO: 42.5 FL (ref 37–54)
EOSINOPHIL # BLD AUTO: 0.72 10*3/MM3 (ref 0–0.4)
EOSINOPHIL NFR BLD AUTO: 9.3 % (ref 0.3–6.2)
ERYTHROCYTE [DISTWIDTH] IN BLOOD BY AUTOMATED COUNT: 13.6 % (ref 12.3–15.4)
GFR SERPL CREATININE-BSD FRML MDRD: 48 ML/MIN/1.73
GLOBULIN UR ELPH-MCNC: 3 GM/DL
GLUCOSE BLD-MCNC: 261 MG/DL (ref 65–99)
GLUCOSE BLDC GLUCOMTR-MCNC: 295 MG/DL (ref 70–130)
GLUCOSE BLDC GLUCOMTR-MCNC: 324 MG/DL (ref 70–130)
GLUCOSE BLDC GLUCOMTR-MCNC: 325 MG/DL (ref 70–130)
GLUCOSE BLDC GLUCOMTR-MCNC: 348 MG/DL (ref 70–130)
HCT VFR BLD AUTO: 33.3 % (ref 37.5–51)
HGB BLD-MCNC: 11 G/DL (ref 13–17.7)
IMM GRANULOCYTES # BLD AUTO: 0.02 10*3/MM3 (ref 0–0.05)
IMM GRANULOCYTES NFR BLD AUTO: 0.3 % (ref 0–0.5)
LYMPHOCYTES # BLD AUTO: 1.51 10*3/MM3 (ref 0.7–3.1)
LYMPHOCYTES NFR BLD AUTO: 19.4 % (ref 19.6–45.3)
MCH RBC QN AUTO: 28.5 PG (ref 26.6–33)
MCHC RBC AUTO-ENTMCNC: 33 G/DL (ref 31.5–35.7)
MCV RBC AUTO: 86.3 FL (ref 79–97)
MONOCYTES # BLD AUTO: 0.91 10*3/MM3 (ref 0.1–0.9)
MONOCYTES NFR BLD AUTO: 11.7 % (ref 5–12)
NEUTROPHILS # BLD AUTO: 4.58 10*3/MM3 (ref 1.7–7)
NEUTROPHILS NFR BLD AUTO: 58.8 % (ref 42.7–76)
NRBC BLD AUTO-RTO: 0 /100 WBC (ref 0–0.2)
PLATELET # BLD AUTO: 289 10*3/MM3 (ref 140–450)
PMV BLD AUTO: 10.8 FL (ref 6–12)
POTASSIUM BLD-SCNC: 5.1 MMOL/L (ref 3.5–5.2)
PROT SERPL-MCNC: 6.6 G/DL (ref 6–8.5)
RBC # BLD AUTO: 3.86 10*6/MM3 (ref 4.14–5.8)
SODIUM BLD-SCNC: 136 MMOL/L (ref 136–145)
WBC NRBC COR # BLD: 7.78 10*3/MM3 (ref 3.4–10.8)

## 2019-11-03 PROCEDURE — 82962 GLUCOSE BLOOD TEST: CPT

## 2019-11-03 PROCEDURE — 63710000001 INSULIN DETEMIR PER 5 UNITS: Performed by: FAMILY MEDICINE

## 2019-11-03 PROCEDURE — 25010000002 ENOXAPARIN PER 10 MG: Performed by: FAMILY MEDICINE

## 2019-11-03 PROCEDURE — 25010000002 ONDANSETRON PER 1 MG: Performed by: FAMILY MEDICINE

## 2019-11-03 PROCEDURE — 63710000001 INSULIN LISPRO (HUMAN) PER 5 UNITS: Performed by: FAMILY MEDICINE

## 2019-11-03 PROCEDURE — 80053 COMPREHEN METABOLIC PANEL: CPT | Performed by: FAMILY MEDICINE

## 2019-11-03 PROCEDURE — 85025 COMPLETE CBC W/AUTO DIFF WBC: CPT | Performed by: FAMILY MEDICINE

## 2019-11-03 RX ORDER — MAGNESIUM CARB/ALUMINUM HYDROX 105-160MG
296 TABLET,CHEWABLE ORAL ONCE
Status: COMPLETED | OUTPATIENT
Start: 2019-11-03 | End: 2019-11-03

## 2019-11-03 RX ADMIN — POLYETHYLENE GLYCOL 3350 17 G: 17 POWDER, FOR SOLUTION ORAL at 08:26

## 2019-11-03 RX ADMIN — GABAPENTIN 100 MG: 100 CAPSULE ORAL at 21:46

## 2019-11-03 RX ADMIN — PANTOPRAZOLE SODIUM 40 MG: 40 TABLET, DELAYED RELEASE ORAL at 21:46

## 2019-11-03 RX ADMIN — OXYCODONE HYDROCHLORIDE AND ACETAMINOPHEN 1 TABLET: 7.5; 325 TABLET ORAL at 02:25

## 2019-11-03 RX ADMIN — LACTULOSE 20 G: 20 SOLUTION ORAL at 08:24

## 2019-11-03 RX ADMIN — SODIUM CHLORIDE, PRESERVATIVE FREE 10 ML: 5 INJECTION INTRAVENOUS at 21:15

## 2019-11-03 RX ADMIN — INSULIN LISPRO 16 UNITS: 100 INJECTION, SOLUTION INTRAVENOUS; SUBCUTANEOUS at 17:14

## 2019-11-03 RX ADMIN — Medication 250 MG: at 08:30

## 2019-11-03 RX ADMIN — INSULIN LISPRO 16 UNITS: 100 INJECTION, SOLUTION INTRAVENOUS; SUBCUTANEOUS at 11:34

## 2019-11-03 RX ADMIN — MILK OF MAGNESIA 10 ML: 2400 CONCENTRATE ORAL at 10:29

## 2019-11-03 RX ADMIN — SODIUM CHLORIDE, PRESERVATIVE FREE 10 ML: 5 INJECTION INTRAVENOUS at 08:31

## 2019-11-03 RX ADMIN — ONDANSETRON HYDROCHLORIDE 4 MG: 2 SOLUTION INTRAMUSCULAR; INTRAVENOUS at 10:29

## 2019-11-03 RX ADMIN — INSULIN LISPRO 16 UNITS: 100 INJECTION, SOLUTION INTRAVENOUS; SUBCUTANEOUS at 21:46

## 2019-11-03 RX ADMIN — LATANOPROST 1 DROP: 50 SOLUTION OPHTHALMIC at 21:45

## 2019-11-03 RX ADMIN — DULOXETINE HYDROCHLORIDE 60 MG: 30 CAPSULE, DELAYED RELEASE ORAL at 08:25

## 2019-11-03 RX ADMIN — OXYCODONE HYDROCHLORIDE AND ACETAMINOPHEN 1 TABLET: 7.5; 325 TABLET ORAL at 21:47

## 2019-11-03 RX ADMIN — OXYCODONE HYDROCHLORIDE AND ACETAMINOPHEN 1 TABLET: 7.5; 325 TABLET ORAL at 14:10

## 2019-11-03 RX ADMIN — Medication 250 MG: at 21:46

## 2019-11-03 RX ADMIN — ASPIRIN 81 MG: 81 TABLET ORAL at 08:30

## 2019-11-03 RX ADMIN — INSULIN DETEMIR 20 UNITS: 100 INJECTION, SOLUTION SUBCUTANEOUS at 21:48

## 2019-11-03 RX ADMIN — GABAPENTIN 100 MG: 100 CAPSULE ORAL at 08:29

## 2019-11-03 RX ADMIN — INSULIN DETEMIR 15 UNITS: 100 INJECTION, SOLUTION SUBCUTANEOUS at 08:22

## 2019-11-03 RX ADMIN — BUMETANIDE 1 MG: 0.25 INJECTION INTRAMUSCULAR; INTRAVENOUS at 08:29

## 2019-11-03 RX ADMIN — ONDANSETRON HYDROCHLORIDE 4 MG: 2 SOLUTION INTRAMUSCULAR; INTRAVENOUS at 02:23

## 2019-11-03 RX ADMIN — CLINDAMYCIN IN 5 PERCENT DEXTROSE 900 MG: 18 INJECTION, SOLUTION INTRAVENOUS at 03:46

## 2019-11-03 RX ADMIN — NYSTATIN: 100000 POWDER TOPICAL at 08:33

## 2019-11-03 RX ADMIN — CLINDAMYCIN IN 5 PERCENT DEXTROSE 900 MG: 18 INJECTION, SOLUTION INTRAVENOUS at 18:32

## 2019-11-03 RX ADMIN — OXYCODONE HYDROCHLORIDE AND ACETAMINOPHEN 1 TABLET: 7.5; 325 TABLET ORAL at 08:24

## 2019-11-03 RX ADMIN — ROSUVASTATIN CALCIUM 40 MG: 20 TABLET, FILM COATED ORAL at 08:31

## 2019-11-03 RX ADMIN — ENOXAPARIN SODIUM 30 MG: 30 INJECTION SUBCUTANEOUS at 21:46

## 2019-11-03 RX ADMIN — Medication 296 ML: at 14:16

## 2019-11-03 RX ADMIN — CLINDAMYCIN IN 5 PERCENT DEXTROSE 900 MG: 18 INJECTION, SOLUTION INTRAVENOUS at 11:34

## 2019-11-03 RX ADMIN — ONDANSETRON HYDROCHLORIDE 4 MG: 2 SOLUTION INTRAMUSCULAR; INTRAVENOUS at 21:46

## 2019-11-03 RX ADMIN — INSULIN LISPRO 12 UNITS: 100 INJECTION, SOLUTION INTRAVENOUS; SUBCUTANEOUS at 08:22

## 2019-11-03 RX ADMIN — LACTULOSE 20 G: 20 SOLUTION ORAL at 21:45

## 2019-11-03 RX ADMIN — LOSARTAN POTASSIUM 100 MG: 50 TABLET, FILM COATED ORAL at 08:24

## 2019-11-03 NOTE — PROGRESS NOTES
[facility]  INPATIENT PROGRESS NOTE    Length of Stay: 7  Date of Admission: 10/27/2019  Primary Care Physician: Del Shetty MD    Subjective   Chief Complaint: Acute on chronic renal failure.  Osteomyelitis.  Chest pain.  Shortness of breath.    HPI   She did not sleep well last night.  Patient the pain back pain.  Patient also have nausea symptoms overnight.  Net urine output -1 point 8K.  Kidney function is back to baseline.    Review of Systems   Constitutional: Positive for activity change, appetite change and fatigue. Negative for chills and fever.   HENT: Negative for hearing loss, nosebleeds, tinnitus and trouble swallowing.    Eyes: Negative for visual disturbance.   Respiratory: Positive for shortness of breath and wheezing. Negative for cough and chest tightness.    Cardiovascular: Negative for chest pain, palpitations and leg swelling.   Gastrointestinal: Positive for abdominal pain and nausea. Negative for abdominal distention, blood in stool, constipation, diarrhea and vomiting.   Endocrine: Negative for cold intolerance, heat intolerance, polydipsia, polyphagia and polyuria.   Genitourinary: Negative for decreased urine volume, difficulty urinating, dysuria, flank pain, frequency and hematuria.   Musculoskeletal: Positive for arthralgias, back pain, gait problem and myalgias. Negative for joint swelling.   Skin: Positive for wound. Negative for rash.        Right foot.   Allergic/Immunologic: Negative for immunocompromised state.   Neurological: Positive for weakness. Negative for dizziness, syncope, light-headedness and headaches.   Hematological: Negative for adenopathy. Does not bruise/bleed easily.   Psychiatric/Behavioral: Negative for confusion and sleep disturbance. The patient is not nervous/anxious.    All pertinent negatives and positives are as above. All other systems have been reviewed and are negative unless otherwise stated.      Objective    Temp:  [97.9 °F (36.6 °C)-98.4 °F (36.9 °C)] 98.4 °F (36.9 °C)  Heart Rate:  [68-87] 78  Resp:  [16] 16  BP: (132-147)/(40-75) 145/75    Intake/Output Summary (Last 24 hours) at 11/3/2019 1354  Last data filed at 11/3/2019 1300  Gross per 24 hour   Intake 480 ml   Output 2320 ml   Net -1840 ml     Physical Exam  Constitutional: He is oriented to person, place, and time. He appears well-developed.   HENT:   Head: Normocephalic and atraumatic.   Eyes: Conjunctivae are normal. Pupils are equal, round, and reactive to light.   Neck: Neck supple. No JVD present. No thyromegaly present.   Cardiovascular: Normal rate, regular rhythm, normal heart sounds and intact distal pulses. Exam reveals no gallop and no friction rub.   No murmur heard.  Pulmonary/Chest: Effort normal. No respiratory distress.  Diminished breath sound bilateral, clear.  He has no rales. He exhibits no tenderness.   Diminished breath sound bilateral.  Patient is not requiring oxygen.   Abdominal: Soft. Bowel sounds are normal. He exhibits no distension. There is no tenderness. There is no rebound and no guarding.   Gross morbid obesity.   Musculoskeletal: He exhibits edema. He exhibits no tenderness or deformity.   +2 pitting edema lower extremities   Lymphadenopathy:     He has no cervical adenopathy.   Neurological: He is alert and oriented to person, place, and time. He displays normal reflexes. No cranial nerve deficit. He exhibits abnormal muscle tone. Coordination abnormal.   Skin: Skin is warm and dry. Capillary refill takes 2 to 3 seconds. No rash noted.   Right foot wound.  Covered gauze.   Psychiatric: He has a normal mood and affect. His behavior is normal. Thought content normal.   Nursing note and vitals reviewed.  Results Review:  Lab Results (last 24 hours)     Procedure Component Value Units Date/Time    POC Glucose Once [[id number]]  (Abnormal) Collected:  11/03/19 1123    Specimen:  Blood Updated:  11/03/19 0939      Glucose 324 mg/dL      Comment: : [id number] Miracle Del Cid  LMeter ID: [id number]       POC Glucose Once [[id number]]  (Abnormal) Collected:  11/03/19 0756    Specimen:  Blood Updated:  11/03/19 0807     Glucose 295 mg/dL      Comment: : [id number] Miracle Del Cid  LMeter ID: [id number]       Comprehensive Metabolic Panel [[id number]]  (Abnormal) Collected:  11/03/19 0611    Specimen:  Blood Updated:  11/03/19 0721     Glucose 261 mg/dL      BUN 40 mg/dL      Creatinine 1.49 mg/dL      Sodium 136 mmol/L      Potassium 5.1 mmol/L      Chloride 98 mmol/L      CO2 27.0 mmol/L      Calcium 9.1 mg/dL      Total Protein 6.6 g/dL      Albumin 3.60 g/dL      ALT (SGPT) 11 U/L      AST (SGOT) 13 U/L      Alkaline Phosphatase 72 U/L      Total Bilirubin 0.4 mg/dL      eGFR Non African Amer 48 mL/min/1.73      Globulin 3.0 gm/dL      A/G Ratio 1.2 g/dL      BUN/Creatinine Ratio 26.8     Anion Gap 11.0 mmol/L     Narrative:       GFR Normal >60  Chronic Kidney Disease <60  Kidney Failure <15    CBC & Differential [[id number]] Collected:  11/03/19 0611    Specimen:  Blood Updated:  11/03/19 0702    Narrative:       The following orders were created for panel order CBC & Differential.  Procedure                               Abnormality         Status                     ---------                               -----------         ------                     CBC Auto Differential[[id number]]        Abnormal            Final result                 Please view results for these tests on the individual orders.    CBC Auto Differential [[id number]]  (Abnormal) Collected:  11/03/19 0611    Specimen:  Blood Updated:  11/03/19 0702     WBC 7.78 10*3/mm3      RBC 3.86 10*6/mm3      Hemoglobin 11.0 g/dL      Hematocrit 33.3 %      MCV 86.3 fL      MCH 28.5 pg      MCHC 33.0 g/dL      RDW 13.6 %      RDW-SD 42.5 fl      MPV 10.8 fL      Platelets 289 10*3/mm3      Neutrophil % 58.8 %      Lymphocyte % 19.4 %      Monocyte % 11.7 %       "Eosinophil % 9.3 %      Basophil % 0.5 %      Immature Grans % 0.3 %      Neutrophils, Absolute 4.58 10*3/mm3      Lymphocytes, Absolute 1.51 10*3/mm3      Monocytes, Absolute 0.91 10*3/mm3      Eosinophils, Absolute 0.72 10*3/mm3      Basophils, Absolute 0.04 10*3/mm3      Immature Grans, Absolute 0.02 10*3/mm3      nRBC 0.0 /100 WBC     POC Glucose Once [[id number]]  (Abnormal) Collected:  11/02/19 2057    Specimen:  Blood Updated:  11/02/19 2108     Glucose 319 mg/dL      Comment: : [id number] Destiny Garces ID: [id number]       POC Glucose Once [[id number]]  (Abnormal) Collected:  11/02/19 1649    Specimen:  Blood Updated:  11/02/19 1701     Glucose 319 mg/dL      Comment: : [id number] Miracle Del Cid  LMeter ID: [id number]              Cultures:  Blood Culture   Date Value Ref Range Status   10/27/2019 No growth at 5 days  Final   10/27/2019 No growth at 5 days  Final       Radiology Data:    Imaging Results (Last 24 Hours)     ** No results found for the last 24 hours. **          Allergies   Allergen Reactions   • Bactrim [Sulfamethoxazole-Trimethoprim] Other (See Comments)     \"RENAL FAILURE\"   • Vancomycin Itching       Scheduled meds:     aspirin 81 mg Oral Daily   bumetanide 1 mg Intravenous Daily   clindamycin 900 mg Intravenous Q8H   DULoxetine 60 mg Oral Daily   enoxaparin 30 mg Subcutaneous Q24H   gabapentin 100 mg Oral BID   insulin detemir 15 Units Subcutaneous Q12H   insulin lispro 0-24 Units Subcutaneous 4x Daily With Meals & Nightly   lactulose 20 g Oral BID   latanoprost 1 drop Both Eyes Nightly   losartan 100 mg Oral Q24H   methylnaltrexone 8 mg Subcutaneous Every Other Day   nystatin  Topical Q12H   pantoprazole 40 mg Oral Nightly   polyethylene glycol 17 g Oral Daily   rosuvastatin 40 mg Oral Daily   saccharomyces boulardii 250 mg Oral BID   sodium chloride 10 mL Intravenous Q12H       PRN meds:  •  acetaminophen **OR** acetaminophen **OR** acetaminophen  •  bisacodyl  •  dextrose  •  " dextrose  •  diphenhydrAMINE  •  glucagon (human recombinant)  •  influenza vaccine  •  ipratropium-albuterol  •  magnesium hydroxide  •  ondansetron  •  oxyCODONE-acetaminophen  •  sodium chloride  •  sodium chloride  •  traZODone    Assessment/Plan       Chronic diastolic congestive heart failure (CMS/[facility])    CAD (coronary artery disease)    Diabetes mellitus (CMS/[facility])    Type 2 diabetes mellitus with hyperglycemia, with long-term current use of insulin (CMS/[facility])    Abdominal pain    Diabetic foot infection (CMS/[facility])    WANDER (acute kidney injury) (CMS/[facility])    Anemia due to chronic kidney disease    Morbid obesity with BMI of 40.0-44.9, adult (CMS/[facility])      Plan:  Acute on chronic renal failure. Chronic stage III renal failure.  Renal functions is back to baseline. Nephrology consult.  Baseline creatinine 1.57.  On Bumex by nephrology for fluid overload.     Chest pain/hypertension/fluid overload.  Continue aspirin.  Lovenox prophylaxis.  Continue Cozaar.  Continue Bumex.  Chest x-ray-no acute changes.  CTA of the chest- small pleural effusions may be due to volume overload, no evidence of pulmonary embolus or pneumonia.  Echocardiogram-ejection fraction 56%, mild concentric hypertrophy, no significant abnormality cardiac valve.     Chronic right leg wound/osteomyelitis/history of MRSA.  See Dr. Cerrato outpatient.  Patient had hyperbaric therapy for right lower foot wound.  Possible reaction vancomycin last night.  Plan to consult infectious disease for antibiotics.  Consult wound care.  Sacral decubitus precaution.  Doppler ultrasound of the right leg- there is no evidence of deep venous thrombosis or superficial thrombophlebitis of the right lower extremity.     Elevated d-dimer/chest pain/shortness of breath.    VQ scan - findings are most commonly associated with low probability for acute  pulmonary embolism.      COPD.  Duo nebs 4 times PRN.     Diabetes.  Insulin-dependent type 2.  Sliding scale.  Increase  Levemir.  High sliding scale.  Diabetic educator consult.     Abdomen pain/constipation.  Continue Relistor.    KUB shows constipation.  MiraLAX daily.  CT scan abdomen pelvic- no evidence of acute abdominopelvic process.   2, subcutaneous edema in the anterior abdomen and in the dependent  portions of the hips and buttocks- findings could be due to volume  overload.     Depression/neuropathy/chronic pain.  Continue Cymbalta.  Continue Neurontin.  Benadryl PRN.  Trazodone as needed.     Reflux.  Protonix.  Zofran as needed.     Gross morbid obesity.  BMI is 45.  Diet and exercise has been discussed with patient.     Nutrition.  Probiotic.  Renal diet.     Deconditioning.  PT and OT consult.      Blood cultures-no growth for 5 days.     Discharge Plannin to 4 days. LTAC placement pending.     Telly Rodriguez MD   19   1:54 PM

## 2019-11-03 NOTE — PLAN OF CARE
Problem: Patient Care Overview  Goal: Plan of Care Review  Outcome: Ongoing (interventions implemented as appropriate)   11/03/19 0315   Coping/Psychosocial   Plan of Care Reviewed With patient   Plan of Care Review   Progress no change   OTHER   Outcome Summary safety maintained. vss. pt medicated for pain X2 and nausea X1. pt has been sitting in the chair intermittently. dressing on foot; is clean dry and intact. Blood sugar was 316 at bedtime and was treated with SSI. No distress noted. will continue to monitor.      Goal: Individualization and Mutuality  Outcome: Ongoing (interventions implemented as appropriate)    Goal: Discharge Needs Assessment  Outcome: Ongoing (interventions implemented as appropriate)      Problem: Pain, Chronic (Adult)  Goal: Acceptable Pain/Comfort Level and Functional Ability  Outcome: Ongoing (interventions implemented as appropriate)      Problem: Fall Risk (Adult)  Goal: Absence of Fall  Outcome: Ongoing (interventions implemented as appropriate)      Problem: Renal Failure/Kidney Injury, Acute (Adult)  Goal: Signs and Symptoms of Listed Potential Problems Will be Absent, Minimized or Managed (Renal Failure/Kidney Injury, Acute)  Outcome: Ongoing (interventions implemented as appropriate)      Problem: Wound (Includes Pressure Injury) (Adult)  Goal: Signs and Symptoms of Listed Potential Problems Will be Absent, Minimized or Managed (Wound)  Outcome: Ongoing (interventions implemented as appropriate)

## 2019-11-03 NOTE — PROGRESS NOTES
Nephrology ([facility]) Progress Note      Patient:  Erick Luong  YOB: 1955  Date of Service: 11/3/2019  MRN: [id number]   Acct: [id number]   Primary Care Physician: Del Shetty MD  Advance Directive:   Code Status and Medical Interventions:   Ordered at: 10/27/19 2021     Level Of Support Discussed With:    Patient     Code Status:    CPR     Medical Interventions (Level of Support Prior to Arrest):    Full     Admit Date: 10/27/2019       Hospital Day: 7  Referring Provider: Abebe Garzon DO      Patient personally seen and examined.  Complete chart including Consults, Notes, Operative Reports, Labs, Cardiology, and Radiology studies reviewed as able.         Subjective:  Erick Luong is a 64 y.o. male  whom we were consulted for acute kidney injury.  Baseline chronic kidney disease stage 3, baseline creatinine 1.3.  Has followed with our office on an irregular basis in the past.  History of type 2 diabetes, hypertension, congestive heart failure.  Undergoing treatment for chronic right foot wound; has PICC line in place and has been getting IV Vancomycin.  Last few days has developed increased edema, dyspnea. Unable to lay flat at night.  Diminished urine output.  Hospital course remarkable for initiation of Bumex drip with good output since it was started.      Today, notes a good night.  Diuresing well with bumex.  Denies fever, chills, chest pain, nausea or vomiting.  Family present    Allergies:  Bactrim [sulfamethoxazole-trimethoprim] and Vancomycin    Home Meds:  Medications Prior to Admission   Medication Sig Dispense Refill Last Dose   • aspirin 81 MG EC tablet Take 81 mg by mouth Daily.   10/27/2019 at Unknown time   • bumetanide (BUMEX) 2 MG tablet Take 1 tablet by mouth 2 (Two) Times a Day As Needed (edema). (Patient taking differently: Take 2 mg by mouth 2 (Two) Times a Day.) 30 tablet 2 10/27/2019 at Unknown time   • DULoxetine (CYMBALTA) 60 MG  capsule Take 60 mg by mouth Daily.   10/27/2019 at Unknown time   • gabapentin (NEURONTIN) 100 MG capsule Take 100 mg by mouth 2 (Two) Times a Day.   10/27/2019 at Unknown time   • insulin glargine (LANTUS) 100 UNIT/ML injection Inject 20 Units under the skin into the appropriate area as directed Every Night.   10/27/2019 at Unknown time   • insulin regular (humuLIN R) 500 UNIT/ML CONCENTRATED injection Inject 100 Units under the skin 3 (Three) Times a Day Before Meals. Packaging states 100 units with regular meals; 120 units with large meals or 360 units daily   10/27/2019 at Unknown time   • lactulose (CHRONULAC) 10 GM/15ML solution Take 20 g by mouth 3 (Three) Times a Day As Needed.   10/27/2019 at Unknown time   • latanoprost (XALATAN) 0.005 % ophthalmic solution Administer 1 drop to both eyes Every Night.   10/27/2019 at Unknown time   • losartan (COZAAR) 100 MG tablet Take 100 mg by mouth Daily.   10/27/2019 at Unknown time   • ondansetron ODT (ZOFRAN-ODT) 8 MG disintegrating tablet Take 8 mg by mouth Every 6 (Six) Hours As Needed for Nausea or Vomiting.   10/27/2019 at Unknown time   • oxyCODONE-acetaminophen (PERCOCET)  MG per tablet Take 1 tablet by mouth 2 (Two) Times a Day.   10/27/2019 at Unknown time   • pantoprazole (PROTONIX) 40 MG EC tablet Take 1 tablet by mouth Daily. 90 tablet 3 10/27/2019 at Unknown time   • rosuvastatin (CRESTOR) 40 MG tablet Take 40 mg by mouth Daily.   10/27/2019 at Unknown time   • saccharomyces boulardii (FLORASTOR) 250 MG capsule Take 250 mg by mouth 2 (Two) Times a Day.   10/27/2019 at Unknown time   • traZODone (DESYREL) 50 MG tablet Take 25-50 mg by mouth At Night As Needed for Sleep.   10/26/2019 at Unknown time       Medicines:  Current Facility-Administered Medications   Medication Dose Route Frequency Provider Last Rate Last Dose   • acetaminophen (TYLENOL) tablet 650 mg  650 mg Oral Q4H PRN Abebe Garzon, DO        Or   • acetaminophen (TYLENOL) 160  MG/5ML solution 650 mg  650 mg Oral Q4H PRN Abebe Garzon DO        Or   • acetaminophen (TYLENOL) suppository 650 mg  650 mg Rectal Q4H PRN Abebe Garzon DO       • aspirin EC tablet 81 mg  81 mg Oral Daily Abebe Garzon DO   81 mg at 11/03/19 0830   • bisacodyl (DULCOLAX) suppository 10 mg  10 mg Rectal Daily PRN Raquel Duncan, APRN   10 mg at 11/01/19 0101   • bumetanide (BUMEX) injection 1 mg  1 mg Intravenous Daily Stewart Alvarez, APRN   1 mg at 11/03/19 0829   • clindamycin (CLEOCIN) 900 mg in dextrose 5% 50 mL IVPB (premix)  900 mg Intravenous Q8H Julia Adrian MD 50 mL/hr at 11/03/19 1134 900 mg at 11/03/19 1134   • dextrose (D50W) 25 g/ 50mL Intravenous Solution 25 g  25 g Intravenous Q15 Min PRN Telly Rodriguez MD       • dextrose (GLUTOSE) oral gel 15 g  15 g Oral Q15 Min PRN Telly Rodriguez MD       • diphenhydrAMINE (BENADRYL) injection 25 mg  25 mg Intravenous Q6H PRN Raquel Duncan, APRN   25 mg at 10/29/19 2336   • DULoxetine (CYMBALTA) DR capsule 60 mg  60 mg Oral Daily Abebe Garzon DO   60 mg at 11/03/19 0825   • enoxaparin (LOVENOX) syringe 30 mg  30 mg Subcutaneous Q24H Telly Rodriguez MD   30 mg at 11/02/19 2108   • gabapentin (NEURONTIN) capsule 100 mg  100 mg Oral BID Abebe Garzon DO   100 mg at 11/03/19 0829   • glucagon (human recombinant) (GLUCAGEN DIAGNOSTIC) injection 1 mg  1 mg Subcutaneous Q15 Min PRN Telly Rodriguez MD       • Influenza Vac Subunit Quad (FLUCELVAX) injection 0.5 mL  0.5 mL Intramuscular During Hospitalization Abebe Garzon DO       • insulin detemir (LEVEMIR) injection 20 Units  20 Units Subcutaneous Q12H Rodriguez, Telly C, MD       • insulin lispro (humaLOG) injection 0-24 Units  0-24 Units Subcutaneous 4x Daily With Meals & Nightly Telly Rodriguez MD   16 Units at 11/03/19 1714   • ipratropium-albuterol (DUO-NEB) nebulizer solution 3 mL  3 mL Nebulization Q6H PRN Abebe Garzon DO       • lactulose solution  20 g  20 g Oral BID Abebe Garzon DO   20 g at 11/03/19 0824   • latanoprost (XALATAN) 0.005 % ophthalmic solution 1 drop  1 drop Both Eyes Nightly Telly Rodriguez MD   1 drop at 11/01/19 2245   • losartan (COZAAR) tablet 100 mg  100 mg Oral Q24H Abebe Garzon DO   100 mg at 11/03/19 0824   • magnesium hydroxide (MILK OF MAGNESIA) suspension 2400 mg/10mL 10 mL  10 mL Oral Daily PRN Raquel Duncan, APRN   10 mL at 11/03/19 1029   • methylnaltrexone (RELISTOR) injection 8 mg  8 mg Subcutaneous Every Other Day Abebe Garzon DO   8 mg at 11/02/19 0822   • nystatin (MYCOSTATIN) powder   Topical Q12H Raquel Duncan, APRN       • ondansetron (ZOFRAN) injection 4 mg  4 mg Intravenous Q6H PRN Telly Rodriguez MD   4 mg at 11/03/19 1029   • oxyCODONE-acetaminophen (PERCOCET) 7.5-325 MG per tablet 1 tablet  1 tablet Oral Q4H PRN Telly Rodriguez MD   1 tablet at 11/03/19 1410   • pantoprazole (PROTONIX) EC tablet 40 mg  40 mg Oral Nightly Telly Rodriguez MD   40 mg at 11/02/19 2108   • polyethylene glycol 3350 powder (packet)  17 g Oral Daily Telly Rodriguez MD   17 g at 11/03/19 0826   • rosuvastatin (CRESTOR) tablet 40 mg  40 mg Oral Daily Abebe Garzon DO   40 mg at 11/03/19 0831   • saccharomyces boulardii (FLORASTOR) capsule 250 mg  250 mg Oral BID Abebe Garzon DO   250 mg at 11/03/19 0830   • sodium chloride 0.9 % flush 10 mL  10 mL Intravenous PRN Reuben Tadeo MD       • sodium chloride 0.9 % flush 10 mL  10 mL Intravenous Q12H Abebe Garzon DO   10 mL at 11/03/19 0831   • sodium chloride 0.9 % flush 10 mL  10 mL Intravenous PRN Abebe Garzon DO       • traZODone (DESYREL) tablet 25 mg  25 mg Oral Nightly PRN Abebe Garzon,            Past Medical History:  Past Medical History:   Diagnosis Date   • Arthritis    • CHF (congestive heart failure) (CMS/[facility])    • Coronary artery disease    • Diabetes mellitus (CMS/[facility])    • Hyperlipidemia    • Hypertension    •  Myocardial infarction (CMS/HCC)    • Pancreatitis    • Renal disorder    • Sleep apnea with use of continuous positive airway pressure (CPAP)        Past Surgical History:  Past Surgical History:   Procedure Laterality Date   • ABDOMINAL SURGERY     • APPENDECTOMY     • BACK SURGERY     • CARDIAC SURGERY     • CATARACT EXTRACTION     • CERVICAL SPINE SURGERY     • COLONOSCOPY N/A 1/31/2017    Normal exam repeat in 5 years   • COLONOSCOPY N/A 2/11/2019    Procedure: COLONOSCOPY WITH ANESTHESIA;  Surgeon: Tyrell Smith MD;  Location: [facility] ENDOSCOPY;  Service: Gastroenterology   • COLONOSCOPY W/ POLYPECTOMY  03/04/2014    Hyperplastic polyp   • CORONARY ARTERY BYPASS GRAFT  10/2015   • ENDOSCOPY  04/13/2011    Gastritis with hemorrhage   • ENDOSCOPY N/A 5/5/2017    Normal exam   • ENDOSCOPY N/A 2/11/2019    Procedure: ESOPHAGOGASTRODUODENOSCOPY WITH ANESTHESIA;  Surgeon: Tyrell Smith MD;  Location: [facility] ENDOSCOPY;  Service: Gastroenterology   • INCISION AND DRAINAGE OF WOUND Left 09/2007    spider bite       Family History  Family History   Problem Relation Age of Onset   • Colon cancer Father    • Heart disease Father    • Colon cancer Sister    • Colon polyps Sister    • Alzheimer's disease Mother    • Coronary artery disease Sister    • Coronary artery disease Sister        Social History  Social History     Socioeconomic History   • Marital status:      Spouse name: Not on file   • Number of children: Not on file   • Years of education: Not on file   • Highest education level: Not on file   Tobacco Use   • Smoking status: Never Smoker   • Smokeless tobacco: Never Used   • Tobacco comment: smoked in highschool   Substance and Sexual Activity   • Alcohol use: No   • Drug use: No   • Sexual activity: Defer         Review of Systems:  History obtained from chart review and the patient  General ROS: No fever or chills  Respiratory ROS: No cough, shortness of breath, wheezing  Cardiovascular ROS: No  chest pain or palpitations  Gastrointestinal ROS: No abdominal pain or melena  Genito-Urinary ROS: No dysuria or hematuria    Objective:  Patient Vitals for the past 24 hrs:   BP Temp Temp src Pulse Resp SpO2 Weight   11/03/19 1113 145/75 98.4 °F (36.9 °C) Oral 78 16 97 % --   11/03/19 0652 137/65 98 °F (36.7 °C) Oral 68 16 97 % --   11/03/19 0600 -- -- -- -- -- -- (!) 150 kg (329 lb 9.6 oz)   11/03/19 0318 147/60 98.3 °F (36.8 °C) Oral 85 16 97 % --   11/02/19 2217 132/43 97.9 °F (36.6 °C) Oral 80 16 98 % --   11/02/19 2125 -- -- -- -- -- -- (!) 150 kg (330 lb)   11/02/19 1825 138/40 97.9 °F (36.6 °C) Oral 87 16 98 % --       Intake/Output Summary (Last 24 hours) at 11/3/2019 1727  Last data filed at 11/3/2019 1632  Gross per 24 hour   Intake 480 ml   Output 2670 ml   Net -2190 ml     General: awake/alert   Chest:  clear to auscultation bilaterally without respiratory distress  CVS: regular rate and rhythm  Abdominal: soft, nontender, positive bowel sounds  Extremities: ble edema  Skin: warm and dry without rash      Labs:  Results from last 7 days   Lab Units 11/03/19 0611 11/02/19 0628 11/01/19  0736   WBC 10*3/mm3 7.78 7.77 10.85*   HEMOGLOBIN g/dL 11.0* 11.0* 11.2*   HEMATOCRIT % 33.3* 33.7* 34.1*   PLATELETS 10*3/mm3 289 307 339         Results from last 7 days   Lab Units 11/03/19 0611 11/02/19 0628 11/01/19  0736   SODIUM mmol/L 136 137 136   POTASSIUM mmol/L 5.1 5.1 4.8   CHLORIDE mmol/L 98 97* 94*   CO2 mmol/L 27.0 28.0 29.0   BUN mg/dL 40* 51* 57*   CREATININE mg/dL 1.49* 1.80* 2.02*   CALCIUM mg/dL 9.1 8.9 8.7   BILIRUBIN mg/dL 0.4 0.4 0.3   ALK PHOS U/L 72 73 68   ALT (SGPT) U/L 11 15 16   AST (SGOT) U/L 13 15 19   GLUCOSE mg/dL 261* 275* 249*       Radiology:   Imaging Results (Last 72 Hours)     Procedure Component Value Units Date/Time    XR Abdomen KUB [[id number]] Collected:  10/31/19 2125     Updated:  10/31/19 2128    Narrative:       EXAMINATION: XR ABDOMEN KUB-     10/31/2019 9:11 PM CDT      HISTORY: severe abdominal pain; N17.9-Acute kidney failure, unspecified;  I50.9-Heart failure, unspecified; N39.0-Urinary tract infection, site  not specified; Z74.09-Other reduced mobility     1 view abdomen compared with 01/25/2019.     Nonspecific bowel gas pattern with no sign of obstruction.     No abnormal calcification or unexplained foreign body is seen.     Mild degenerative lumbar spine changes.     Summary:  1. Nonspecific bowel gas pattern.  2. Moderate fecal material is noted within the colon.  This report was finalized on 10/31/2019 21:25 by Dr. Gomez Mcgill MD.          Culture:  Blood Culture   Date Value Ref Range Status   10/27/2019 No growth at 5 days  Final   10/27/2019 No growth at 5 days  Final         Assessment   Acute kidney injury  Chronic kidney disease stage 3  Type 2 diabetes with nephropathy  Essential hypertension  Right foot wound  Volume overload  Anemia      Plan:  Continue intermittent IV Bumex as creatinine improved and continues to be net negative (total of >21L), continue current dosing  Reviewed diet and medication adjustments with patient and family  Monitor labs  Discussed with patient, nursing, family      Willy Arteaga MD  11/3/2019  5:27 PM

## 2019-11-03 NOTE — PLAN OF CARE
Problem: Patient Care Overview  Goal: Plan of Care Review  Outcome: Ongoing (interventions implemented as appropriate)   11/03/19 0800 11/03/19 1327   Coping/Psychosocial   Plan of Care Reviewed With patient --    Plan of Care Review   Progress --  improving   OTHER   Outcome Summary --  Pt medicated for nausea and pain x 1. Up in chair. C/O constipation. MOM and prune juice given. No results at this time.C/o back pain. BUN-40, Creatinine 1.49. Dressing to right foot as ordered.     Goal: Individualization and Mutuality  Outcome: Ongoing (interventions implemented as appropriate)   10/28/19 1844 10/31/19 1455 11/02/19 1604   Individualization   Patient Specific Preferences Prefers door closed --  --    Patient Specific Goals (Include Timeframe) --  wound healing --    Patient Specific Interventions --  ensure safety, dsg change as ordered --    Mutuality/Individual Preferences   What Anxieties, Fears, Concerns, or Questions Do You Have About Your Care? --  --  Concerned about going to [facility] or going home   What Information Would Help Us Give You More Personalized Care? --  --  Pt has a PICC line that he came from home with.   How Would You and/or Your Support Person Like to Participate in Your Care? --  --  Keep pt and spouse updated on treatment plan   Mutuality/Individual Preferences   How to Address Anxieties/Fears --  --   is working on having pt placed in [facility]       Problem: Pain, Chronic (Adult)  Goal: Acceptable Pain/Comfort Level and Functional Ability  Outcome: Ongoing (interventions implemented as appropriate)   11/03/19 1327   Pain, Chronic (Adult)   Acceptable Pain/Comfort Level and Functional Ability making progress toward outcome       Problem: Fall Risk (Adult)  Goal: Absence of Fall  Outcome: Ongoing (interventions implemented as appropriate)   11/03/19 1327   Fall Risk (Adult)   Absence of Fall making progress toward outcome       Problem: Renal Failure/Kidney Injury, Acute  (Adult)  Goal: Signs and Symptoms of Listed Potential Problems Will be Absent, Minimized or Managed (Renal Failure/Kidney Injury, Acute)  Outcome: Ongoing (interventions implemented as appropriate)   11/02/19 0242 11/03/19 0315   Goal/Outcome Evaluation   Problems Assessed (Acute Renal Failure/Kidney Injury) --  all   Problems Present (ARF/Kidney Injury) situational response --        Problem: Wound (Includes Pressure Injury) (Adult)  Goal: Signs and Symptoms of Listed Potential Problems Will be Absent, Minimized or Managed (Wound)  Outcome: Ongoing (interventions implemented as appropriate)   11/02/19 0242 11/03/19 0315   Goal/Outcome Evaluation   Problems Assessed (Wound) --  all   Problems Present (Wound) delayed wound healing;pain;infection --

## 2019-11-04 LAB
ALBUMIN SERPL-MCNC: 3.9 G/DL (ref 3.5–5.2)
ALBUMIN/GLOB SERPL: 1.3 G/DL
ALP SERPL-CCNC: 75 U/L (ref 39–117)
ALT SERPL W P-5'-P-CCNC: 15 U/L (ref 1–41)
ANION GAP SERPL CALCULATED.3IONS-SCNC: 9 MMOL/L (ref 5–15)
AST SERPL-CCNC: 16 U/L (ref 1–40)
BASOPHILS # BLD AUTO: 0.03 10*3/MM3 (ref 0–0.2)
BASOPHILS NFR BLD AUTO: 0.4 % (ref 0–1.5)
BILIRUB SERPL-MCNC: 0.4 MG/DL (ref 0.2–1.2)
BUN BLD-MCNC: 33 MG/DL (ref 8–23)
BUN/CREAT SERPL: 19.1 (ref 7–25)
CALCIUM SPEC-SCNC: 9.2 MG/DL (ref 8.6–10.5)
CHLORIDE SERPL-SCNC: 96 MMOL/L (ref 98–107)
CO2 SERPL-SCNC: 31 MMOL/L (ref 22–29)
CREAT BLD-MCNC: 1.73 MG/DL (ref 0.76–1.27)
DEPRECATED RDW RBC AUTO: 42.1 FL (ref 37–54)
EOSINOPHIL # BLD AUTO: 0.58 10*3/MM3 (ref 0–0.4)
EOSINOPHIL NFR BLD AUTO: 8.4 % (ref 0.3–6.2)
ERYTHROCYTE [DISTWIDTH] IN BLOOD BY AUTOMATED COUNT: 13.5 % (ref 12.3–15.4)
GFR SERPL CREATININE-BSD FRML MDRD: 40 ML/MIN/1.73
GLOBULIN UR ELPH-MCNC: 3 GM/DL
GLUCOSE BLD-MCNC: 241 MG/DL (ref 65–99)
GLUCOSE BLDC GLUCOMTR-MCNC: 240 MG/DL (ref 70–130)
GLUCOSE BLDC GLUCOMTR-MCNC: 357 MG/DL (ref 70–130)
GLUCOSE BLDC GLUCOMTR-MCNC: 372 MG/DL (ref 70–130)
GLUCOSE BLDC GLUCOMTR-MCNC: 373 MG/DL (ref 70–130)
HCT VFR BLD AUTO: 34.1 % (ref 37.5–51)
HGB BLD-MCNC: 11.3 G/DL (ref 13–17.7)
IMM GRANULOCYTES # BLD AUTO: 0.02 10*3/MM3 (ref 0–0.05)
IMM GRANULOCYTES NFR BLD AUTO: 0.3 % (ref 0–0.5)
LYMPHOCYTES # BLD AUTO: 1.49 10*3/MM3 (ref 0.7–3.1)
LYMPHOCYTES NFR BLD AUTO: 21.6 % (ref 19.6–45.3)
MCH RBC QN AUTO: 28.3 PG (ref 26.6–33)
MCHC RBC AUTO-ENTMCNC: 33.1 G/DL (ref 31.5–35.7)
MCV RBC AUTO: 85.5 FL (ref 79–97)
MONOCYTES # BLD AUTO: 0.85 10*3/MM3 (ref 0.1–0.9)
MONOCYTES NFR BLD AUTO: 12.3 % (ref 5–12)
NEUTROPHILS # BLD AUTO: 3.94 10*3/MM3 (ref 1.7–7)
NEUTROPHILS NFR BLD AUTO: 57 % (ref 42.7–76)
NRBC BLD AUTO-RTO: 0 /100 WBC (ref 0–0.2)
PLATELET # BLD AUTO: 299 10*3/MM3 (ref 140–450)
PMV BLD AUTO: 10.8 FL (ref 6–12)
POTASSIUM BLD-SCNC: 5.1 MMOL/L (ref 3.5–5.2)
PROT SERPL-MCNC: 6.9 G/DL (ref 6–8.5)
RBC # BLD AUTO: 3.99 10*6/MM3 (ref 4.14–5.8)
SODIUM BLD-SCNC: 136 MMOL/L (ref 136–145)
WBC NRBC COR # BLD: 6.91 10*3/MM3 (ref 3.4–10.8)

## 2019-11-04 PROCEDURE — 85025 COMPLETE CBC W/AUTO DIFF WBC: CPT | Performed by: FAMILY MEDICINE

## 2019-11-04 PROCEDURE — 25010000002 ENOXAPARIN PER 10 MG: Performed by: FAMILY MEDICINE

## 2019-11-04 PROCEDURE — 25010000002 ONDANSETRON PER 1 MG: Performed by: FAMILY MEDICINE

## 2019-11-04 PROCEDURE — 25010000002 METHYLNALTREXONE 12 MG/0.6ML SOLUTION: Performed by: INTERNAL MEDICINE

## 2019-11-04 PROCEDURE — 97116 GAIT TRAINING THERAPY: CPT

## 2019-11-04 PROCEDURE — 63710000001 INSULIN DETEMIR PER 5 UNITS: Performed by: FAMILY MEDICINE

## 2019-11-04 PROCEDURE — 63710000001 INSULIN LISPRO (HUMAN) PER 5 UNITS: Performed by: FAMILY MEDICINE

## 2019-11-04 PROCEDURE — 80053 COMPREHEN METABOLIC PANEL: CPT | Performed by: FAMILY MEDICINE

## 2019-11-04 PROCEDURE — 97110 THERAPEUTIC EXERCISES: CPT

## 2019-11-04 PROCEDURE — 82962 GLUCOSE BLOOD TEST: CPT

## 2019-11-04 RX ORDER — BUMETANIDE 1 MG/1
1 TABLET ORAL DAILY
Status: DISCONTINUED | OUTPATIENT
Start: 2019-11-05 | End: 2019-11-05

## 2019-11-04 RX ORDER — LOSARTAN POTASSIUM 50 MG/1
50 TABLET ORAL
Status: DISCONTINUED | OUTPATIENT
Start: 2019-11-05 | End: 2019-11-05

## 2019-11-04 RX ADMIN — INSULIN LISPRO 20 UNITS: 100 INJECTION, SOLUTION INTRAVENOUS; SUBCUTANEOUS at 18:30

## 2019-11-04 RX ADMIN — INSULIN DETEMIR 20 UNITS: 100 INJECTION, SOLUTION SUBCUTANEOUS at 20:34

## 2019-11-04 RX ADMIN — BUMETANIDE 1 MG: 0.25 INJECTION INTRAMUSCULAR; INTRAVENOUS at 09:44

## 2019-11-04 RX ADMIN — Medication 250 MG: at 09:13

## 2019-11-04 RX ADMIN — CLINDAMYCIN IN 5 PERCENT DEXTROSE 900 MG: 18 INJECTION, SOLUTION INTRAVENOUS at 20:35

## 2019-11-04 RX ADMIN — ONDANSETRON HYDROCHLORIDE 4 MG: 2 SOLUTION INTRAMUSCULAR; INTRAVENOUS at 20:36

## 2019-11-04 RX ADMIN — SODIUM CHLORIDE, PRESERVATIVE FREE 10 ML: 5 INJECTION INTRAVENOUS at 09:45

## 2019-11-04 RX ADMIN — GABAPENTIN 100 MG: 100 CAPSULE ORAL at 20:36

## 2019-11-04 RX ADMIN — ENOXAPARIN SODIUM 30 MG: 30 INJECTION SUBCUTANEOUS at 20:36

## 2019-11-04 RX ADMIN — LACTULOSE 20 G: 20 SOLUTION ORAL at 20:36

## 2019-11-04 RX ADMIN — OXYCODONE HYDROCHLORIDE AND ACETAMINOPHEN 1 TABLET: 7.5; 325 TABLET ORAL at 02:17

## 2019-11-04 RX ADMIN — SODIUM CHLORIDE, PRESERVATIVE FREE 10 ML: 5 INJECTION INTRAVENOUS at 20:37

## 2019-11-04 RX ADMIN — NYSTATIN: 100000 POWDER TOPICAL at 20:37

## 2019-11-04 RX ADMIN — OXYCODONE HYDROCHLORIDE AND ACETAMINOPHEN 1 TABLET: 7.5; 325 TABLET ORAL at 14:43

## 2019-11-04 RX ADMIN — INSULIN DETEMIR 20 UNITS: 100 INJECTION, SOLUTION SUBCUTANEOUS at 09:15

## 2019-11-04 RX ADMIN — METHYLNALTREXONE BROMIDE 8 MG: 12 INJECTION, SOLUTION SUBCUTANEOUS at 09:45

## 2019-11-04 RX ADMIN — OXYCODONE HYDROCHLORIDE AND ACETAMINOPHEN 1 TABLET: 7.5; 325 TABLET ORAL at 09:41

## 2019-11-04 RX ADMIN — LACTULOSE 20 G: 20 SOLUTION ORAL at 09:14

## 2019-11-04 RX ADMIN — LATANOPROST 1 DROP: 50 SOLUTION OPHTHALMIC at 20:36

## 2019-11-04 RX ADMIN — OXYCODONE HYDROCHLORIDE AND ACETAMINOPHEN 1 TABLET: 7.5; 325 TABLET ORAL at 20:36

## 2019-11-04 RX ADMIN — CLINDAMYCIN IN 5 PERCENT DEXTROSE 900 MG: 18 INJECTION, SOLUTION INTRAVENOUS at 12:30

## 2019-11-04 RX ADMIN — INSULIN LISPRO 8 UNITS: 100 INJECTION, SOLUTION INTRAVENOUS; SUBCUTANEOUS at 09:19

## 2019-11-04 RX ADMIN — ONDANSETRON HYDROCHLORIDE 4 MG: 2 SOLUTION INTRAMUSCULAR; INTRAVENOUS at 04:11

## 2019-11-04 RX ADMIN — CLINDAMYCIN IN 5 PERCENT DEXTROSE 900 MG: 18 INJECTION, SOLUTION INTRAVENOUS at 04:11

## 2019-11-04 RX ADMIN — POLYETHYLENE GLYCOL 3350 17 G: 17 POWDER, FOR SOLUTION ORAL at 09:13

## 2019-11-04 RX ADMIN — MILK OF MAGNESIA 10 ML: 2400 CONCENTRATE ORAL at 09:14

## 2019-11-04 RX ADMIN — GABAPENTIN 100 MG: 100 CAPSULE ORAL at 09:14

## 2019-11-04 RX ADMIN — NYSTATIN: 100000 POWDER TOPICAL at 09:45

## 2019-11-04 RX ADMIN — Medication 250 MG: at 20:36

## 2019-11-04 RX ADMIN — NYSTATIN: 100000 POWDER TOPICAL at 02:17

## 2019-11-04 RX ADMIN — INSULIN LISPRO 16 UNITS: 100 INJECTION, SOLUTION INTRAVENOUS; SUBCUTANEOUS at 13:19

## 2019-11-04 RX ADMIN — ASPIRIN 81 MG: 81 TABLET ORAL at 09:20

## 2019-11-04 RX ADMIN — DULOXETINE HYDROCHLORIDE 60 MG: 30 CAPSULE, DELAYED RELEASE ORAL at 09:25

## 2019-11-04 RX ADMIN — INSULIN LISPRO 4 UNITS: 100 INJECTION, SOLUTION INTRAVENOUS; SUBCUTANEOUS at 20:36

## 2019-11-04 RX ADMIN — PANTOPRAZOLE SODIUM 40 MG: 40 TABLET, DELAYED RELEASE ORAL at 20:36

## 2019-11-04 RX ADMIN — LOSARTAN POTASSIUM 100 MG: 50 TABLET, FILM COATED ORAL at 09:13

## 2019-11-04 RX ADMIN — ROSUVASTATIN CALCIUM 40 MG: 20 TABLET, FILM COATED ORAL at 09:13

## 2019-11-04 NOTE — PROGRESS NOTES
"INFECTIOUS DISEASES PROGRESS NOTE    Patient:  Erick Luong  YOB: 1955  MRN: [id number]   Admit date: 10/27/2019   Admitting Physician: Willy Rollins MD  Primary Care Physician: Del Shetty MD    Chief Complaint: No bowel movement since October 31/November 1      Interval History: Patient reports he thinks he is feeling \"a little better\".  He still has some nausea.  He is not sure if it had anything to do with the vancomycin is now that should be out of his system.    He does state that he has not had any bowel movement since \"they gave me that enema few days ago\".    I asked if he was requesting anything and he said \"they are giving me some stuff\".    I spoke with ELVIS Walker to see if they passed on and report his previous abdominal pain, scans that had quite a bit of stool, etc. and she was not aware of that.        Allergies:   Allergies   Allergen Reactions   • Bactrim [Sulfamethoxazole-Trimethoprim] Other (See Comments)     \"RENAL FAILURE\"   • Vancomycin Itching       Current Meds:     Current Facility-Administered Medications:   •  acetaminophen (TYLENOL) tablet 650 mg, 650 mg, Oral, Q4H PRN **OR** acetaminophen (TYLENOL) 160 MG/5ML solution 650 mg, 650 mg, Oral, Q4H PRN **OR** acetaminophen (TYLENOL) suppository 650 mg, 650 mg, Rectal, Q4H PRN, Abebe Garzon DO  •  aspirin EC tablet 81 mg, 81 mg, Oral, Daily, Abebe Garzon DO, 81 mg at 11/03/19 0830  •  bisacodyl (DULCOLAX) suppository 10 mg, 10 mg, Rectal, Daily PRN, Raquel Duncan, APRN, 10 mg at 11/01/19 0101  •  bumetanide (BUMEX) injection 1 mg, 1 mg, Intravenous, Daily, Stewart Alvarez, APRN, 1 mg at 11/03/19 0829  •  clindamycin (CLEOCIN) 900 mg in dextrose 5% 50 mL IVPB (premix), 900 mg, Intravenous, Q8H, Julia Adrian MD, Last Rate: 50 mL/hr at 11/04/19 0411, 900 mg at 11/04/19 0411  •  dextrose (D50W) 25 g/ 50mL Intravenous Solution 25 g, 25 g, Intravenous, Q15 Min PRN, Telly Rodriguez MD  •  " dextrose (GLUTOSE) oral gel 15 g, 15 g, Oral, Q15 Min PRN, Telly Rodriguez MD  •  diphenhydrAMINE (BENADRYL) injection 25 mg, 25 mg, Intravenous, Q6H PRN, Raquel Duncan APRN, 25 mg at 10/29/19 2336  •  DULoxetine (CYMBALTA) DR capsule 60 mg, 60 mg, Oral, Daily, Abebe Garzon DO, 60 mg at 11/03/19 0825  •  enoxaparin (LOVENOX) syringe 30 mg, 30 mg, Subcutaneous, Q24H, Telly Rodriguez MD, 30 mg at 11/03/19 2146  •  gabapentin (NEURONTIN) capsule 100 mg, 100 mg, Oral, BID, Abebe Garzon DO, 100 mg at 11/03/19 2146  •  glucagon (human recombinant) (GLUCAGEN DIAGNOSTIC) injection 1 mg, 1 mg, Subcutaneous, Q15 Min PRN, Telly Rodriguez MD  •  Influenza Vac Subunit Quad (FLUCELVAX) injection 0.5 mL, 0.5 mL, Intramuscular, During Hospitalization, Abebe Garzon DO  •  insulin detemir (LEVEMIR) injection 20 Units, 20 Units, Subcutaneous, Q12H, Telly Rodriguez MD, 20 Units at 11/03/19 2148  •  insulin lispro (humaLOG) injection 0-24 Units, 0-24 Units, Subcutaneous, 4x Daily With Meals & Nightly, Telly Rodriguez MD, 16 Units at 11/03/19 2146  •  ipratropium-albuterol (DUO-NEB) nebulizer solution 3 mL, 3 mL, Nebulization, Q6H PRN, Abebe Garzon DO  •  lactulose solution 20 g, 20 g, Oral, BID, Abebe Garzon DO, 20 g at 11/03/19 2145  •  latanoprost (XALATAN) 0.005 % ophthalmic solution 1 drop, 1 drop, Both Eyes, Nightly, Telly Rodriguez MD, 1 drop at 11/03/19 2145  •  losartan (COZAAR) tablet 100 mg, 100 mg, Oral, Q24H, Abebe Garzon DO, 100 mg at 11/03/19 0824  •  magnesium hydroxide (MILK OF MAGNESIA) suspension 2400 mg/10mL 10 mL, 10 mL, Oral, Daily PRN, Raquel Duncan, APRN, 10 mL at 11/03/19 1029  •  methylnaltrexone (RELISTOR) injection 8 mg, 8 mg, Subcutaneous, Every Other Day, Abebe Garzon DO, 8 mg at 11/02/19 0822  •  nystatin (MYCOSTATIN) powder, , Topical, Q12H, Raquel Duncan, APRN  •  ondansetron (ZOFRAN) injection 4 mg, 4 mg, Intravenous, Q6H PRN, Telly Rodriguez,  "MD, 4 mg at 19 0411  •  oxyCODONE-acetaminophen (PERCOCET) 7.5-325 MG per tablet 1 tablet, 1 tablet, Oral, Q4H PRN, Telly Rodriguez MD, 1 tablet at 19 021  •  pantoprazole (PROTONIX) EC tablet 40 mg, 40 mg, Oral, Nightly, Telly Rodriguez MD, 40 mg at 19  •  polyethylene glycol 3350 powder (packet), 17 g, Oral, Daily, Telly Rodriguez MD, 17 g at 19 0826  •  rosuvastatin (CRESTOR) tablet 40 mg, 40 mg, Oral, Daily, Abebe Garzon DO, 40 mg at 19 0831  •  saccharomyces boulardii (FLORASTOR) capsule 250 mg, 250 mg, Oral, BID, Abebe Garzon DO, 250 mg at 19  •  sodium chloride 0.9 % flush 10 mL, 10 mL, Intravenous, PRN, Reuben Tadeo MD  •  sodium chloride 0.9 % flush 10 mL, 10 mL, Intravenous, Q12H, Abebe Garzon DO, 10 mL at 19  •  sodium chloride 0.9 % flush 10 mL, 10 mL, Intravenous, PRN, Abebe Garzon DO  •  traZODone (DESYREL) tablet 25 mg, 25 mg, Oral, Nightly PRN, Abebe Garzon DO      Review of Systems   Constitutional: Negative for fever.   Gastrointestinal: Positive for constipation.       Objective     Vital Signs:  Temp (24hrs), Av.3 °F (36.8 °C), Min:98.2 °F (36.8 °C), Max:98.4 °F (36.9 °C)      /84 (BP Location: Right arm, Patient Position: Sitting)   Pulse 83   Temp 98.4 °F (36.9 °C) (Oral)   Resp 18   Ht 182.9 cm (72\")   Wt (!) 145 kg (320 lb)   SpO2 96%   BMI 43.40 kg/m²         Physical Exam   General: The patient is an obese gentleman sitting in a recliner in no acute distress.    HEENT: Sclera anicteric and noninjected  Abdomen: Obese, no focal, no rebound or guarding  Extremities:    Wound is dressed in the right lower extremity and is dry and intact  Psych: Is pleasant and cooperative  Neuro: Alert and oriented, speech is clear.      Line/IV site: PICC upper arm left, condition patent and no redness.  Peripheral IV right hand      Results Review:    I reviewed the patient's new clinical " results.    Lab Results:  CBC:   Lab Results   Lab 10/29/19  0437 10/30/19  0549 10/31/19  0540 11/01/19  0736 11/02/19  0628 11/03/19  0611 11/04/19  0713   WBC 9.93 11.16* 10.53 10.85* 7.77 7.78 6.91   HEMOGLOBIN 11.4* 11.4* 11.3* 11.2* 11.0* 11.0* 11.3*   HEMATOCRIT 33.8* 33.5* 33.7* 34.1* 33.7* 33.3* 34.1*   PLATELETS 361 356 397 339 307 289 299         CMP:   Lab Results   Lab 11/02/19  0628 11/03/19  0611 11/04/19  0713   SODIUM 137 136 136   POTASSIUM 5.1 5.1 5.1   CHLORIDE 97* 98 96*   CO2 28.0 27.0 31.0*   BUN 51* 40* 33*   CREATININE 1.80* 1.49* 1.73*   CALCIUM 8.9 9.1 9.2   BILIRUBIN 0.4 0.4 0.4   ALK PHOS 73 72 75   ALT (SGPT) 15 11 15   AST (SGOT) 15 13 16   GLUCOSE 275* 261* 241*         Culture Results:        Microbiology Results Abnormal     Procedure Component Value - Date/Time    Blood Culture - Blood, Hand, Right [[id number]] Collected:  10/27/19 1815    Lab Status:  Final result Specimen:  Blood from Hand, Right Updated:  11/01/19 1845     Blood Culture No growth at 5 days    Blood Culture - Blood, Hand, Right [[id number]] Collected:  10/27/19 1655    Lab Status:  Final result Specimen:  Blood from Hand, Right Updated:  11/01/19 1715     Blood Culture No growth at 5 days          September cultures per Dr. Cerrato  Specimen Information: Foot, Right; Tissue           Tissue Culture     Lab   Light growth (2+) Staphylococcus aureus, MRSA Abnormal    [facility]     Methicillin resistant Staphylococcus aureus, Patient may be an isolation risk.          Gram Stain    Lab   Occasional WBCs seen  [facility]       No organisms seen  [facility]             Susceptibility               Staphylococcus aureus, MRSA       MATT       Clindamycin 0.25 ug/ml Susceptible       Erythromycin 4 ug/ml Intermediate       Inducible Clindamycin Resistance NEG ug/ml Negative       Oxacillin >=4 ug/ml Resistant       Penicillin G >=0.5 ug/ml Resistant       Rifampin <=0.5 ug/ml Susceptible       Tetracycline <=1 ug/ml  Susceptible       Trimethoprim + Sulfamethoxazole <=10 ug/ml Susceptible       Vancomycin <=0.5 ug/ml Susceptible                 Susceptibility Comments      Staphylococcus aureus, MRSA   This isolate does not demonstrate inducible clindamycin resistance in vitro.          Specimen Collected: 09/19/19 12:06 Last Resulted: 09/22/19 10:57 Order Details View Encounter Lab                   Radiology:   Imaging Results (Last 72 Hours)     ** No results found for the last 72 hours. **          Assessment/Plan     Active Hospital Problems    Diagnosis   • Morbid obesity with BMI of 40.0-44.9, adult (CMS/[facility])   • WANDER (acute kidney injury) (CMS/[facility])   • Anemia due to chronic kidney disease   • Diabetic foot infection (CMS/[facility])   • Abdominal pain   • Type 2 diabetes mellitus with hyperglycemia, with long-term current use of insulin (CMS/[facility])     hold insulin the am of procedure     • CAD (coronary artery disease)   • Chronic diastolic congestive heart failure (CMS/[facility])   • Diabetes mellitus (CMS/[facility])       IMPRESSION:  1. Diabetic foot infection with ulcer and underlying osteomyelitis the right foot-followed by Dr. Cerrato as an outpatient.  Patient had been on intravenous vancomycin for this osteomyelitis since October 8 and plan was for 6-week course of therapy.  He has developed a reaction to vancomycin with itching and shortness of breath and a rapid response was called.  It is now listed as an allergy.  He was switched to IV clindamycin October 30  2. Poorly controlled diabetes-blood glucose in 200-300s  3. Chronic kidney disease stage III with baseline creatinine 1.3 per nephrology-worsening  4. Constipation- per attending.    5. Hypertension  6. Congestive heart failure        RECOMMENDATION:   Continue clindamycin.        Per Dr. Cerrato's note patient started antibiotic therapy on October 8 and plan was for 6-week course.  Patient was also going to hyperbaric oxygen therapy.  He was okay with change to clindamycin or  doxycycline.    We will place one-time order for tap water enema (avoid Fleets in patient with acute kidney injury)    Discussed with ELVIS Walker MD  11/04/19  8:43 AM

## 2019-11-04 NOTE — PROGRESS NOTES
Continued Stay Note  [facility]     Patient Name: Erick Luong  MRN: [id number]  Today's Date: 11/4/2019    Admit Date: 10/27/2019    Discharge Plan     Row Name 11/04/19 1016       Plan    Plan  [facility] precert    Patient/Family in Agreement with Plan  yes    Plan Comments  Spoke with Domenica [id number] with [facility] and she has started precert.     Row Name 11/04/19 0831       Plan    Plan Comments  DR. HAND WANTED TO KNOW ABOUT IF PT WAS GOING TO [facility]. CALLED [facility] PT HAS NOT SAID HE WOULD GO SO PRECERT HAD NOT BEEN STARTED. I TALKED WITH PT AND DR. HAND PT NOW HAS SAID YES TO [facility]. CALLED [facility] SHE IS CALLING DOMENICA. OMERO CLEMENS RN  11/4 @ [id number]        Discharge Codes    No documentation.             MEJIA Carreno

## 2019-11-04 NOTE — PROGRESS NOTES
[facility]  INPATIENT PROGRESS NOTE    Patient Name: Erick Luong  Date of Admission: 10/27/2019  Today's Date: 11/04/19  Length of Stay: 8  Primary Care Physician: Del Shetty MD    Subjective   Chief Complaint: feeling better  HPI   Doing ok.  Afebrile  No SOA, not much leg swelling  Agrees to go to LTAC  R foot not much pain        Review of Systems   Constitutional: Positive for fatigue. Negative for fever.   HENT: Negative for congestion and ear pain.    Eyes: Negative for redness and visual disturbance.   Respiratory: Negative for cough, shortness of breath and wheezing.    Cardiovascular: Negative for chest pain and palpitations.   Gastrointestinal: Negative for abdominal pain, diarrhea, nausea and vomiting.   Endocrine: Negative for cold intolerance and heat intolerance.   Genitourinary: Negative for dysuria and frequency.   Musculoskeletal: Negative for arthralgias and back pain.   Skin: Positive for wound. Negative for rash.   Neurological: Negative for dizziness and headaches.   Psychiatric/Behavioral: Negative for confusion. The patient is not nervous/anxious.         All pertinent negatives and positives are as above. All other systems have been reviewed and are negative unless otherwise stated.     Objective    Temp:  [98.2 °F (36.8 °C)-98.4 °F (36.9 °C)] 98.2 °F (36.8 °C)  Heart Rate:  [80-84] 84  Resp:  [16-18] 18  BP: (113-141)/(44-84) 113/44  Physical Exam   Constitutional: He is oriented to person, place, and time. He appears well-developed and well-nourished.   HENT:   Head: Normocephalic and atraumatic.   Right Ear: External ear normal.   Left Ear: External ear normal.   Nose: Nose normal.   Mouth/Throat: Oropharynx is clear and moist.   Eyes: Conjunctivae and EOM are normal. Pupils are equal, round, and reactive to light. Right eye exhibits no discharge. Left eye exhibits no discharge. No scleral icterus.   Neck: Normal range of  motion. Neck supple. No tracheal deviation present. No thyromegaly present.   Cardiovascular: Normal rate, regular rhythm, normal heart sounds and intact distal pulses. Exam reveals no gallop and no friction rub.   No murmur heard.  Trace BLE edema   Pulmonary/Chest: Effort normal and breath sounds normal. No stridor. No respiratory distress. He has no wheezes. He has no rales. He exhibits no tenderness.   Abdominal: Soft. Bowel sounds are normal. He exhibits no distension and no mass. There is no tenderness. There is no rebound and no guarding. No hernia.   Musculoskeletal: Normal range of motion. He exhibits no edema or deformity.   Lymphadenopathy:     He has no cervical adenopathy.   Neurological: He is alert and oriented to person, place, and time. He has normal reflexes. He displays normal reflexes. No cranial nerve deficit. He exhibits normal muscle tone. Coordination normal.   Skin: Skin is warm and dry. No rash noted. No erythema. No pallor.   R foot wrapped   Psychiatric: He has a normal mood and affect. His behavior is normal. Judgment and thought content normal.   Vitals reviewed.          Results Review:  I have reviewed the labs, radiology results, and diagnostic studies.    Laboratory Data:   Results from last 7 days   Lab Units 11/04/19  0713 11/03/19  0611 11/02/19  0628   WBC 10*3/mm3 6.91 7.78 7.77   HEMOGLOBIN g/dL 11.3* 11.0* 11.0*   HEMATOCRIT % 34.1* 33.3* 33.7*   PLATELETS 10*3/mm3 299 289 307        Results from last 7 days   Lab Units 11/04/19  0713 11/03/19  0611 11/02/19  0628   SODIUM mmol/L 136 136 137   POTASSIUM mmol/L 5.1 5.1 5.1   CHLORIDE mmol/L 96* 98 97*   CO2 mmol/L 31.0* 27.0 28.0   BUN mg/dL 33* 40* 51*   CREATININE mg/dL 1.73* 1.49* 1.80*   CALCIUM mg/dL 9.2 9.1 8.9   BILIRUBIN mg/dL 0.4 0.4 0.4   ALK PHOS U/L 75 72 73   ALT (SGPT) U/L 15 11 15   AST (SGOT) U/L 16 13 15   GLUCOSE mg/dL 241* 261* 275*       Culture Data:        Radiology Data:   Imaging Results (Last 24 Hours)      ** No results found for the last 24 hours. **          I have reviewed the patient's current medications.     Assessment/Plan     Active Hospital Problems    Diagnosis   • Morbid obesity with BMI of 40.0-44.9, adult (CMS/[facility])   • WANDER (acute kidney injury) (CMS/[facility])   • Anemia due to chronic kidney disease   • Diabetic foot infection (CMS/[facility])   • Abdominal pain   • Type 2 diabetes mellitus with hyperglycemia, with long-term current use of insulin (CMS/[facility])     hold insulin the am of procedure     • CAD (coronary artery disease)   • Chronic diastolic congestive heart failure (CMS/[facility])   • Diabetes mellitus (CMS/[facility])   Osteomyelitis R foot    Continue present care, IV clindamycin  Continue PO diuretics as per nephrology            Discharge Planning: I expect the patient to be discharged to LTAC in 1-2 days  Willy Rollins MD   11/04/19   3:20 PM

## 2019-11-04 NOTE — PROGRESS NOTES
Nephrology ([facility]) Progress Note      Patient:  Erick Luong  YOB: 1955  Date of Service: 11/4/2019  MRN: [id number]   Acct: [id number]   Primary Care Physician: Del Shetty MD  Advance Directive:   Code Status and Medical Interventions:   Ordered at: 10/27/19 2021     Level Of Support Discussed With:    Patient     Code Status:    CPR     Medical Interventions (Level of Support Prior to Arrest):    Full     Admit Date: 10/27/2019       Hospital Day: 8  Referring Provider: Abebe Garzon DO      Patient personally seen and examined.  Complete chart including Consults, Notes, Operative Reports, Labs, Cardiology, and Radiology studies reviewed as able.        Subjective:  Erick Luong is a 64 y.o. male  whom we were consulted for acute kidney injury.  Baseline chronic kidney disease stage 3, baseline creatinine 1.3.  Has followed with our office on an irregular basis in the past.  History of type 2 diabetes, hypertension, congestive heart failure.  Undergoing treatment for chronic right foot wound; has PICC line in place and has been getting IV Vancomycin.  Last few days has developed increased edema, dyspnea. Unable to lay flat at night.  Diminished urine output.  Hospital course remarkable for initiation of Bumex drip with excellent response. Transitioned to intermittent IV dose Bumex on 10/29.  Night of 10/29 had apparent allergic reaction to Vancomycin; required a rapid response call.  Has been maintaining good output with intermittent Bumex    Today complaint of abdominal discomfort/no BM in 3-4 days.  Edema continues to improve.  No new overnight issues. Urine output nonoliguric    Allergies:  Bactrim [sulfamethoxazole-trimethoprim] and Vancomycin    Home Meds:  Medications Prior to Admission   Medication Sig Dispense Refill Last Dose   • aspirin 81 MG EC tablet Take 81 mg by mouth Daily.   10/27/2019 at Unknown time   • bumetanide (BUMEX) 2 MG tablet  Take 1 tablet by mouth 2 (Two) Times a Day As Needed (edema). (Patient taking differently: Take 2 mg by mouth 2 (Two) Times a Day.) 30 tablet 2 10/27/2019 at Unknown time   • DULoxetine (CYMBALTA) 60 MG capsule Take 60 mg by mouth Daily.   10/27/2019 at Unknown time   • gabapentin (NEURONTIN) 100 MG capsule Take 100 mg by mouth 2 (Two) Times a Day.   10/27/2019 at Unknown time   • insulin glargine (LANTUS) 100 UNIT/ML injection Inject 20 Units under the skin into the appropriate area as directed Every Night.   10/27/2019 at Unknown time   • insulin regular (humuLIN R) 500 UNIT/ML CONCENTRATED injection Inject 100 Units under the skin 3 (Three) Times a Day Before Meals. Packaging states 100 units with regular meals; 120 units with large meals or 360 units daily   10/27/2019 at Unknown time   • lactulose (CHRONULAC) 10 GM/15ML solution Take 20 g by mouth 3 (Three) Times a Day As Needed.   10/27/2019 at Unknown time   • latanoprost (XALATAN) 0.005 % ophthalmic solution Administer 1 drop to both eyes Every Night.   10/27/2019 at Unknown time   • losartan (COZAAR) 100 MG tablet Take 100 mg by mouth Daily.   10/27/2019 at Unknown time   • ondansetron ODT (ZOFRAN-ODT) 8 MG disintegrating tablet Take 8 mg by mouth Every 6 (Six) Hours As Needed for Nausea or Vomiting.   10/27/2019 at Unknown time   • oxyCODONE-acetaminophen (PERCOCET)  MG per tablet Take 1 tablet by mouth 2 (Two) Times a Day.   10/27/2019 at Unknown time   • pantoprazole (PROTONIX) 40 MG EC tablet Take 1 tablet by mouth Daily. 90 tablet 3 10/27/2019 at Unknown time   • rosuvastatin (CRESTOR) 40 MG tablet Take 40 mg by mouth Daily.   10/27/2019 at Unknown time   • saccharomyces boulardii (FLORASTOR) 250 MG capsule Take 250 mg by mouth 2 (Two) Times a Day.   10/27/2019 at Unknown time   • traZODone (DESYREL) 50 MG tablet Take 25-50 mg by mouth At Night As Needed for Sleep.   10/26/2019 at Unknown time       Medicines:  Current Facility-Administered  Medications   Medication Dose Route Frequency Provider Last Rate Last Dose   • acetaminophen (TYLENOL) tablet 650 mg  650 mg Oral Q4H PRN Abebe Garzon DO        Or   • acetaminophen (TYLENOL) 160 MG/5ML solution 650 mg  650 mg Oral Q4H PRN Abebe Garzon DO        Or   • acetaminophen (TYLENOL) suppository 650 mg  650 mg Rectal Q4H PRN Abebe Garzon DO       • aspirin EC tablet 81 mg  81 mg Oral Daily Abebe Garzon DO   81 mg at 11/04/19 0920   • bisacodyl (DULCOLAX) suppository 10 mg  10 mg Rectal Daily PRN Raquel Duncan, APRN   10 mg at 11/01/19 0101   • bumetanide (BUMEX) injection 1 mg  1 mg Intravenous Daily Stewart Alvarez, APRN   1 mg at 11/04/19 0944   • clindamycin (CLEOCIN) 900 mg in dextrose 5% 50 mL IVPB (premix)  900 mg Intravenous Q8H Julia Adrian MD 50 mL/hr at 11/04/19 0411 900 mg at 11/04/19 0411   • dextrose (D50W) 25 g/ 50mL Intravenous Solution 25 g  25 g Intravenous Q15 Min PRN Telly Rodriguez MD       • dextrose (GLUTOSE) oral gel 15 g  15 g Oral Q15 Min PRN Telly Rodriguez MD       • diphenhydrAMINE (BENADRYL) injection 25 mg  25 mg Intravenous Q6H PRN Raquel Duncan, APRN   25 mg at 10/29/19 2336   • DULoxetine (CYMBALTA) DR capsule 60 mg  60 mg Oral Daily Abebe Garzon DO   60 mg at 11/04/19 0925   • enoxaparin (LOVENOX) syringe 30 mg  30 mg Subcutaneous Q24H Telly Rodriguez MD   30 mg at 11/03/19 2146   • gabapentin (NEURONTIN) capsule 100 mg  100 mg Oral BID Abebe Garzon DO   100 mg at 11/04/19 0914   • glucagon (human recombinant) (GLUCAGEN DIAGNOSTIC) injection 1 mg  1 mg Subcutaneous Q15 Min PRN Telly Rodriguez MD       • Influenza Vac Subunit Quad (FLUCELVAX) injection 0.5 mL  0.5 mL Intramuscular During Hospitalization Abebe Garzon DO       • insulin detemir (LEVEMIR) injection 20 Units  20 Units Subcutaneous Q12H Telyl Rodriguez MD   20 Units at 11/04/19 0915   • insulin lispro (humaLOG) injection 0-24 Units  0-24 Units  Subcutaneous 4x Daily With Meals & Nightly Telly Rodriguez MD   8 Units at 11/04/19 0919   • ipratropium-albuterol (DUO-NEB) nebulizer solution 3 mL  3 mL Nebulization Q6H PRN Abebe Garzon DO       • lactulose solution 20 g  20 g Oral BID Abebe Garzon DO   20 g at 11/04/19 0914   • latanoprost (XALATAN) 0.005 % ophthalmic solution 1 drop  1 drop Both Eyes Nightly Telly Rodriguez MD   1 drop at 11/03/19 2145   • losartan (COZAAR) tablet 100 mg  100 mg Oral Q24H Abebe Garzon DO   100 mg at 11/04/19 0913   • magnesium hydroxide (MILK OF MAGNESIA) suspension 2400 mg/10mL 10 mL  10 mL Oral Daily PRN Raquel Duncan, APRN   10 mL at 11/04/19 0914   • methylnaltrexone (RELISTOR) injection 8 mg  8 mg Subcutaneous Every Other Day Abebe Garzon DO   8 mg at 11/04/19 0945   • nystatin (MYCOSTATIN) powder   Topical Q12H Raquel Duncan, APRN       • ondansetron (ZOFRAN) injection 4 mg  4 mg Intravenous Q6H PRN Telly Rodriguez MD   4 mg at 11/04/19 0411   • oxyCODONE-acetaminophen (PERCOCET) 7.5-325 MG per tablet 1 tablet  1 tablet Oral Q4H PRN Telly Rodriguez MD   1 tablet at 11/04/19 0941   • pantoprazole (PROTONIX) EC tablet 40 mg  40 mg Oral Nightly Telly Rodriguez MD   40 mg at 11/03/19 2146   • polyethylene glycol 3350 powder (packet)  17 g Oral Daily Telly Rodriguez MD   17 g at 11/04/19 0913   • rosuvastatin (CRESTOR) tablet 40 mg  40 mg Oral Daily Abebe Garzon DO   40 mg at 11/04/19 0913   • saccharomyces boulardii (FLORASTOR) capsule 250 mg  250 mg Oral BID Abebe Garzon DO   250 mg at 11/04/19 0913   • sodium chloride 0.9 % flush 10 mL  10 mL Intravenous PRN Reuben Tadeo MD       • sodium chloride 0.9 % flush 10 mL  10 mL Intravenous Q12H Abebe Garzon DO   10 mL at 11/04/19 0945   • sodium chloride 0.9 % flush 10 mL  10 mL Intravenous PRN Abebe Garzon DO       • traZODone (DESYREL) tablet 25 mg  25 mg Oral Nightly PRN Abebe Garzon DO            Past Medical History:  Past Medical History:   Diagnosis Date   • Arthritis    • CHF (congestive heart failure) (CMS/HCC)    • Coronary artery disease    • Diabetes mellitus (CMS/HCC)    • Hyperlipidemia    • Hypertension    • Myocardial infarction (CMS/HCC)    • Pancreatitis    • Renal disorder    • Sleep apnea with use of continuous positive airway pressure (CPAP)        Past Surgical History:  Past Surgical History:   Procedure Laterality Date   • ABDOMINAL SURGERY     • APPENDECTOMY     • BACK SURGERY     • CARDIAC SURGERY     • CATARACT EXTRACTION     • CERVICAL SPINE SURGERY     • COLONOSCOPY N/A 1/31/2017    Normal exam repeat in 5 years   • COLONOSCOPY N/A 2/11/2019    Procedure: COLONOSCOPY WITH ANESTHESIA;  Surgeon: Tyrell Smith MD;  Location: [facility] ENDOSCOPY;  Service: Gastroenterology   • COLONOSCOPY W/ POLYPECTOMY  03/04/2014    Hyperplastic polyp   • CORONARY ARTERY BYPASS GRAFT  10/2015   • ENDOSCOPY  04/13/2011    Gastritis with hemorrhage   • ENDOSCOPY N/A 5/5/2017    Normal exam   • ENDOSCOPY N/A 2/11/2019    Procedure: ESOPHAGOGASTRODUODENOSCOPY WITH ANESTHESIA;  Surgeon: Tyrell Smith MD;  Location: [facility] ENDOSCOPY;  Service: Gastroenterology   • INCISION AND DRAINAGE OF WOUND Left 09/2007    spider bite       Family History  Family History   Problem Relation Age of Onset   • Colon cancer Father    • Heart disease Father    • Colon cancer Sister    • Colon polyps Sister    • Alzheimer's disease Mother    • Coronary artery disease Sister    • Coronary artery disease Sister        Social History  Social History     Socioeconomic History   • Marital status:      Spouse name: Not on file   • Number of children: Not on file   • Years of education: Not on file   • Highest education level: Not on file   Tobacco Use   • Smoking status: Never Smoker   • Smokeless tobacco: Never Used   • Tobacco comment: smoked in highschool   Substance and Sexual Activity   • Alcohol use: No   •  Drug use: No   • Sexual activity: Defer         Review of Systems:  History obtained from chart review and the patient  General ROS: Patient complains of fatigue and No fever or chills  Respiratory ROS: No cough, shortness of breath, wheezing  Cardiovascular ROS: positive for - dyspnea on exertion and edema  No chest pain or palpitations  Gastrointestinal ROS: No abdominal pain or melena  Genito-Urinary ROS: No dysuria or hematuria  Neurological ROS: no headache or dizziness    Objective:  Patient Vitals for the past 24 hrs:   BP Temp Temp src Pulse Resp SpO2 Weight   11/04/19 0703 -- -- -- -- -- -- (!) 145 kg (320 lb)   11/03/19 2136 141/84 98.4 °F (36.9 °C) Oral 83 18 96 % --   11/03/19 1837 133/55 98.2 °F (36.8 °C) Oral 83 16 97 % --   11/03/19 1113 145/75 98.4 °F (36.9 °C) Oral 78 16 97 % --       Intake/Output Summary (Last 24 hours) at 11/4/2019 1033  Last data filed at 11/3/2019 2136  Gross per 24 hour   Intake 240 ml   Output 1620 ml   Net -1380 ml     General: awake/alert    Neck: supple, no JVD  Chest:  clear to auscultation bilaterally without respiratory distress  CVS: regular rate and rhythm  Abdominal: rounded and soft, nontender, positive bowel sounds  Extremities: 1+ lower extremity edema  Skin: warm and dry without rash   Neuro: no focal motor deficits      Labs:  Results from last 7 days   Lab Units 11/04/19 0713 11/03/19 0611 11/02/19 0628   WBC 10*3/mm3 6.91 7.78 7.77   HEMOGLOBIN g/dL 11.3* 11.0* 11.0*   HEMATOCRIT % 34.1* 33.3* 33.7*   PLATELETS 10*3/mm3 299 289 307         Results from last 7 days   Lab Units 11/04/19  0713 11/03/19 0611 11/02/19 0628   SODIUM mmol/L 136 136 137   POTASSIUM mmol/L 5.1 5.1 5.1   CHLORIDE mmol/L 96* 98 97*   CO2 mmol/L 31.0* 27.0 28.0   BUN mg/dL 33* 40* 51*   CREATININE mg/dL 1.73* 1.49* 1.80*   CALCIUM mg/dL 9.2 9.1 8.9   BILIRUBIN mg/dL 0.4 0.4 0.4   ALK PHOS U/L 75 72 73   ALT (SGPT) U/L 15 11 15   AST (SGOT) U/L 16 13 15   GLUCOSE mg/dL 241* 261* 275*        Radiology:   Imaging Results (last 72 hours)     Procedure Component Value Units Date/Time    CT Angiogram Chest [[id number]] Collected:  10/27/19 1906     Updated:  10/27/19 1911    Narrative:       CT ANGIOGRAM CHEST- 10/27/2019 6:32 PM CDT      HISTORY: Chest pain, acute, PE suspected, intermed prob, positive  D-dimer      COMPARISON: 01/18/2018.      DOSE LENGTH PRODUCT: 525 mGy cm. Automated exposure control was also  utilized to decrease patient radiation dose.     TECHNIQUE: Helical tomographic images of the chest were obtained after  the administration of intravenous contrast following angiogram protocol.  Additionally, 3D and multiplanar reformatted images were provided.        FINDINGS:    Pulmonary arteries: There is adequate enhancement of the pulmonary  arteries to evaluate for central and segmental pulmonary emboli. There  are no filling defects within the main, lobar, segmental or visualized  subsegmental pulmonary arteries. The pulmonary arteries are within  normal limits for size.      Aorta and great vessels: The aorta is well opacified and demonstrates no  dissection or aneurysm. The great vessels are normal in appearance.  Changes of previous CABG are noted.     Visualized neck base: The imaged portion of the base of the neck appears  unremarkable.      Lungs: Dependent changes are seen in both lung bases. There is no  pulmonary edema or focal consolidation. No worrisome nodules are  identified. Small pleural effusions are present bilaterally. The trachea  and bronchial tree are patent.      Heart: Mild dilation of the heart is again noted. There is no  pericardial effusion.      Mediastinum and lymph nodes: No enlarged mediastinal, hilar, or axillary  lymph nodes are present.      Skeletal and soft tissues: The osseous structures of the thorax and  surrounding soft tissues demonstrate no acute process.     Upper abdomen: The imaged portion of the upper abdomen demonstrates no  acute  process.        Impression:       1. Small pleural effusions may be due to volume overload.  2. No evidence of pulmonary embolus or pneumonia.        This report was finalized on 10/27/2019 19:08 by Dr. Sudarshan Starr MD.    CT Abdomen Pelvis With Contrast [[id number]] Collected:  10/27/19 1903     Updated:  10/27/19 1908    Narrative:       CT ABDOMEN PELVIS W CONTRAST- 10/27/2019 6:32 PM CDT     HISTORY: Abdominal pain, unspecified       COMPARISON: 10/12/2019.      DOSE LENGTH PRODUCT: 1919 mGy cm. Automated exposure control was also  utilized to decrease patient radiation dose.     TECHNIQUE: Following the intravenous administration of contrast, helical  CT tomographic images of the abdomen and pelvis were acquired.  Multiplanar reformatted images were provided for review.      FINDINGS:   LOWER CHEST: Findings in the chest will be described in a separate  dictation.      LIVER: No focal liver lesion. The hepatic vasculature is patent.      BILIARY SYSTEM: The gallbladder has been removed. There is no evidence  of biliary ductal dilation.      PANCREAS: There is mild atrophy in the pancreas.      SPLEEN: Unremarkable.      KIDNEYS: Simple cysts are seen in the RIGHT kidney. There has been no  significant interval change. There is no hydronephrosis. The LEFT kidney  is unremarkable. The ureters are decompressed and normal in appearance.     ADRENALS: Unremarkable.     RETROPERITONEUM: No mass, lymphadenopathy or hemorrhage.      GI TRACT: No evidence of obstruction or bowel wall thickening. The  appendix has been removed.     OTHER: There is no mesenteric mass, lymphadenopathy or fluid collection.  The abdominopelvic vasculature is patent. The osseous structures and  soft tissues demonstrate no worrisome lesions. Mild subcutaneous edema  is again noted in the lower abdominal wall.      PELVIS: No mass lesion, fluid collection or significant lymphadenopathy  is seen in the pelvis. The urinary bladder is normal in  appearance.       Impression:       1. No evidence of acute abdominopelvic process.   2. Subcutaneous edema in the anterior abdomen and in the dependent  portions of the hips and buttocks. Findings could be due to volume  overload.        This report was finalized on 10/27/2019 19:05 by Dr. Sudarshan Starr MD.    XR Chest 1 View [[id number]] Collected:  10/27/19 1640     Updated:  10/27/19 1644    Narrative:       Exam: XR CHEST 1 VW-     Indication: Chest Pain Triage Protocol     Comparison: 10/12/2019     Findings:     Cardiac silhouette is stable. Postoperative change of CABG. Chronic mild  right hemidiaphragm elevation. No definite consolidation, large pleural  effusion, or visible pneumothorax. Left upper extremity PICC is stable  in position. No acute osseous findings.       Impression:          No acute findings.  This report was finalized on 10/27/2019 16:41 by Dr. Santana Figueredo MD.          Culture:  Blood Culture   Date Value Ref Range Status   10/27/2019 No growth at 24 hours  Preliminary   10/27/2019 No growth at 24 hours  Preliminary         Assessment   1.  Acute kidney injury due to ATN--stable  2.  Baseline chronic kidney disease stage 3  3.  Type 2 diabetes with nephropathy  4.  Essential hypertension  5.  Right foot wound  6.  Volume overload--improving  7.  Anemia     Plan:  1.  Change to PO Bumex  2.  Monitor labs    Stewart Alvarez, APRN  11/4/2019  10:33 AM

## 2019-11-04 NOTE — PLAN OF CARE
Problem: Patient Care Overview  Goal: Plan of Care Review  Outcome: Ongoing (interventions implemented as appropriate)   11/04/19 9550   Coping/Psychosocial   Plan of Care Reviewed With patient   Plan of Care Review   Progress improving   OTHER   Outcome Summary Patient peforms transfers from chair with supervision and requried assistance with donning of R boot. He ambulted 250' stand by assist and presents with decreased ivelisse and stride length. He perfomed seated BLE therex in all available planes x20 reps and presents with decreased R dorsiflexio. Patient returned to chair with call light in reach. PT to follow.

## 2019-11-04 NOTE — THERAPY TREATMENT NOTE
Acute Care - Physical Therapy Treatment Note  [facility]     Patient Name: Erick Luong  : 1955  MRN: [id number]  Today's Date: 2019  Onset of Illness/Injury or Date of Surgery: 10/27/19          Admit Date: 10/27/2019    Visit Dx:    ICD-10-CM ICD-9-CM   1. WANDER (acute kidney injury) (CMS/Formerly [facility]) N17.9 584.9   2. Congestive heart failure, unspecified HF chronicity, unspecified heart failure type (CMS/Formerly [facility]) I50.9 428.0   3. Urinary tract infection without hematuria, site unspecified N39.0 599.0   4. Decreased mobility and endurance Z74.09 780.99     Patient Active Problem List   Diagnosis   • Lower abdominal pain   • SIRS (systemic inflammatory response syndrome) (CMS/Formerly [facility])   • Fever, unknown origin   • Viral gastroenteritis   • Chronic diastolic congestive heart failure (CMS/Formerly [facility])   • CAD (coronary artery disease)   • Diabetes mellitus (CMS/Formerly [facility])   • Essential hypertension   • Sleep apnea   • Arthritis   • Mixed hyperlipidemia   • Shortness of breath   • Acute pancreatitis   • Chest pain, non-cardiac   • Obesity, unspecified obesity severity, unspecified obesity type   • HTN (hypertension), benign   • Epigastric pain   • Type 2 diabetes mellitus with hyperglycemia, with long-term current use of insulin (CMS/Formerly [facility])   • Pleuritic chest pain   • Slow transit constipation   • Nausea and vomiting   • CKD (chronic kidney disease)   • Nonsmoker   • Orthostatic hypotension   • Abdominal pain   • Constipation   • Ischemic colitis (CMS/Formerly [facility])   • Altered mental status   • Diabetic foot infection (CMS/Formerly [facility])   • WANDER (acute kidney injury) (CMS/Formerly [facility])   • Anemia due to chronic kidney disease   • Morbid obesity with BMI of 40.0-44.9, adult (CMS/Formerly [facility])   • Infection due to enterococcus   • Osteomyelitis of fifth toe of right foot (CMS/Formerly [facility])   • CHF (congestive heart failure) (CMS/Formerly [facility])       Therapy Treatment    Rehabilitation Treatment Summary     Row Name 19 0927             Treatment Time/Intention    Discipline  physical  therapy assistant  -      Document Type  therapy note (daily note)  -LC      Subjective Information  pain  -LC2      Mode of Treatment  physical therapy  -LC      Recorded by [LC] Alesha Napoles, [facility] 11/04/19 0929  [LC2] Alesha Napoles, [facility] 11/04/19 0954      Row Name 11/04/19 0927             Sit-Stand Transfer    Sit-Stand Stone (Transfers)  conditional independence  -LC      Recorded by [LC] Alesha Napoles, [facility] 11/04/19 0952      Row Name 11/04/19 0927             Gait/Stairs Assessment/Training    Stone Level (Gait)  stand by assist  -      Distance in Feet (Gait)  250'  -      Deviations/Abnormal Patterns (Gait)  gait speed decreased;stride length decreased  -LC      Recorded by [LC] Alesha Napoles, [facility] 11/04/19 0952      Row Name 11/04/19 0927             Therapeutic Exercise    Lower Extremity (Therapeutic Exercise)  LAQ (long arc quad), bilateral;marching while seated  -      Lower Extremity Range of Motion (Therapeutic Exercise)  ankle dorsiflexion/plantar flexion, bilateral;knee flexion/extension, bilateral;hip abduction/adduction, bilateral  -      Exercise Type (Therapeutic Exercise)  AROM (active range of motion)  -LC      Position (Therapeutic Exercise)  seated  -      Sets/Reps (Therapeutic Exercise)  20  -LC      Recorded by [LC] Alesha Napoles, [facility] 11/04/19 0952      Row Name 11/04/19 0927             Positioning and Restraints    Pre-Treatment Position  sitting in chair/recliner  -LC      Post Treatment Position  chair  -LC      In Chair  sitting;call light within reach  -      Recorded by [LC] Alesha Napoles, [facility] 11/04/19 0952      Row Name 11/04/19 0927             Pain Scale: Numbers Pre/Post-Treatment    Pain Scale: Numbers, Pretreatment  7/10  -LC      Pain Scale: Numbers, Post-Treatment  7/10  -LC      Pain Location - Side  Right  -LC      Pain Location - Orientation  lower  -LC      Pain Location  abdomen  -LC      Pain Intervention(s)  Medication (See MAR)  -       Recorded by [LC] Yesika Alesha JA, PTA 11/04/19 0952      Row Name                Wound 07/22/19 2120 Right posterior foot diabetic/neuropathic ulceration    Wound - Properties Group Date first assessed: 07/22/19 [KB] Time first assessed: 2120 [KB] Side: Right [KB] Orientation: posterior [KB] Location: foot [KB] Type: diabetic/neuropathic ulceration [KB] Recorded by:  [KB] Samantha Israel RN 07/23/19 0008      User Key  (r) = Recorded By, (t) = Taken By, (c) = Cosigned By    Initials Name Effective Dates Discipline     Alesha Napoles, PTA 10/18/19 -  PT    Samantha Edwards RN 12/31/18 -  Nurse          Wound 07/22/19 2120 Right posterior foot diabetic/neuropathic ulceration (Active)   Dressing Appearance no drainage;dry;intact 11/3/2019  9:45 PM   Closure WALKER 11/3/2019  9:45 PM   Base clean;moist 11/3/2019  7:00 PM   Drainage Amount none 11/3/2019  7:00 PM   Dressing Care, Wound gauze;elastic bandage;dressing changed 11/3/2019  7:00 PM           Physical Therapy Education     Title: PT OT SLP Therapies (Done)     Topic: Physical Therapy (Done)     Point: Mobility training (Done)     Learning Progress Summary           Patient Acceptance, E, VU,DU by OLIVERIO at 11/2/2019  3:04 PM    Comment:  benefits of increased amb    Acceptance, E, VU by AISHWARYA at 10/31/2019  2:18 PM    Comment:  Pt. educated on increasing gait speed/stride length to prevent risk of fall    Acceptance, E, VU by AISHWARYA at 10/30/2019 10:17 AM    Comment:  Pt. educated on importance of maintaining AROM DF R foot needed for gait safety    Acceptance, E, VU by AISHWARYA at 10/29/2019 10:55 AM    Comment:  Pt. educated on importance on ambulating with reduced WB on R foot to  decrease pain    Acceptance, E,D, NR,VU by DE at 10/28/2019  9:46 AM    Comment:  pt educated on safety and proper techinue for functional mobility. pt intructed on proper body mechanics to maximize function and adhere to current safety precautions. pt needs reinforcement with proper use of RW to  decrease weight bearing through R foot                   Point: Body mechanics (Done)     Learning Progress Summary           Patient Acceptance, E, VU by AISHWARYA at 10/31/2019  2:18 PM    Comment:  Pt. educated on increasing gait speed/stride length to prevent risk of fall    Acceptance, E, VU by AISHWARYA at 10/30/2019 10:17 AM    Comment:  Pt. educated on importance of maintaining AROM DF R foot needed for gait safety    Acceptance, E, VU by AISHWARYA at 10/29/2019 10:55 AM    Comment:  Pt. educated on importance on ambulating with reduced WB on R foot to  decrease pain    Acceptance, E,D, NR,VU by DE at 10/28/2019  9:46 AM    Comment:  pt educated on safety and proper techinue for functional mobility. pt intructed on proper body mechanics to maximize function and adhere to current safety precautions. pt needs reinforcement with proper use of RW to decrease weight bearing through R foot                   Point: Precautions (Done)     Learning Progress Summary           Patient Acceptance, E, VU by AISHWARYA at 10/31/2019  2:18 PM    Comment:  Pt. educated on increasing gait speed/stride length to prevent risk of fall    Acceptance, E, VU by AISHWARYA at 10/30/2019 10:17 AM    Comment:  Pt. educated on importance of maintaining AROM DF R foot needed for gait safety    Acceptance, E, VU by AISHWARYA at 10/29/2019 10:55 AM    Comment:  Pt. educated on importance on ambulating with reduced WB on R foot to  decrease pain    Acceptance, E,D, NR,VU by DE at 10/28/2019  9:46 AM    Comment:  pt educated on safety and proper techinue for functional mobility. pt intructed on proper body mechanics to maximize function and adhere to current safety precautions. pt needs reinforcement with proper use of RW to decrease weight bearing through R foot                               User Key     Initials Effective Dates Name Provider Type Discipline     02/11/19 -  Faviola Chung PTA Physical Therapy Assistant PT     09/18/19 -  Melly Méndez PTA Student PTA  Student PT    DE 10/10/19 -  Sudarshan Hogue, PT Student PT Student PT                PT Recommendation and Plan        Outcome Measures     Row Name 11/02/19 1504             How much help from another person do you currently need...    Turning from your back to your side while in flat bed without using bedrails?  4  -AF      Moving from lying on back to sitting on the side of a flat bed without bedrails?  3  -AF      Moving to and from a bed to a chair (including a wheelchair)?  4  -AF      Standing up from a chair using your arms (e.g., wheelchair, bedside chair)?  4  -AF      Climbing 3-5 steps with a railing?  3  -AF      To walk in hospital room?  4  -AF      AM-PAC 6 Clicks Score (PT)  22  -AF         Functional Assessment    Outcome Measure Options  AM-PAC 6 Clicks Basic Mobility (PT)  -AF        User Key  (r) = Recorded By, (t) = Taken By, (c) = Cosigned By    Initials Name Provider Type    AF Gonvick, Faviola, PTA Physical Therapy Assistant         Time Calculation:   PT Charges     Row Name 11/04/19 0955             Time Calculation    Start Time  0927  -      Stop Time  0953  -      Time Calculation (min)  26 min  -      PT Received On  11/04/19  -      PT Goal Re-Cert Due Date  11/07/19  -         Time Calculation- PT    Total Timed Code Minutes- PT  26 minute(s)  -        User Key  (r) = Recorded By, (t) = Taken By, (c) = Cosigned By    Initials Name Provider Type     Alseha Napoles PTA Physical Therapy Assistant        Therapy Charges for Today     Code Description Service Date Service Provider Modifiers Qty    78181669692 HC PT THER PROC EA 15 MIN 11/4/2019 Alesha Napoles PTA GP 1    27747174569 HC GAIT TRAINING EA 15 MIN 11/4/2019 Alesha Napoles PTA GP 1          PT G-Codes  Outcome Measure Options: AM-PAC 6 Clicks Basic Mobility (PT)  AM-PAC 6 Clicks Score (PT): 22  AM-PAC 6 Clicks Score (OT): 20    Alesha Napoles PTA  11/4/2019

## 2019-11-05 ENCOUNTER — APPOINTMENT (OUTPATIENT)
Dept: GENERAL RADIOLOGY | Facility: HOSPITAL | Age: 64
End: 2019-11-05

## 2019-11-05 ENCOUNTER — APPOINTMENT (OUTPATIENT)
Dept: ONCOLOGY | Facility: HOSPITAL | Age: 64
End: 2019-11-05

## 2019-11-05 ENCOUNTER — APPOINTMENT (OUTPATIENT)
Dept: LAB | Facility: HOSPITAL | Age: 64
End: 2019-11-05

## 2019-11-05 LAB
ALBUMIN SERPL-MCNC: 3.9 G/DL (ref 3.5–5.2)
ALBUMIN/GLOB SERPL: 1.3 G/DL
ALP SERPL-CCNC: 69 U/L (ref 39–117)
ALT SERPL W P-5'-P-CCNC: 13 U/L (ref 1–41)
ANION GAP SERPL CALCULATED.3IONS-SCNC: 8 MMOL/L (ref 5–15)
AST SERPL-CCNC: 15 U/L (ref 1–40)
BASOPHILS # BLD AUTO: 0.05 10*3/MM3 (ref 0–0.2)
BASOPHILS NFR BLD AUTO: 0.7 % (ref 0–1.5)
BILIRUB SERPL-MCNC: 0.4 MG/DL (ref 0.2–1.2)
BUN BLD-MCNC: 33 MG/DL (ref 8–23)
BUN/CREAT SERPL: 18.2 (ref 7–25)
CALCIUM SPEC-SCNC: 8.9 MG/DL (ref 8.6–10.5)
CHLORIDE SERPL-SCNC: 96 MMOL/L (ref 98–107)
CO2 SERPL-SCNC: 32 MMOL/L (ref 22–29)
CREAT BLD-MCNC: 1.81 MG/DL (ref 0.76–1.27)
DEPRECATED RDW RBC AUTO: 42.7 FL (ref 37–54)
EOSINOPHIL # BLD AUTO: 0.57 10*3/MM3 (ref 0–0.4)
EOSINOPHIL NFR BLD AUTO: 7.7 % (ref 0.3–6.2)
ERYTHROCYTE [DISTWIDTH] IN BLOOD BY AUTOMATED COUNT: 13.5 % (ref 12.3–15.4)
GFR SERPL CREATININE-BSD FRML MDRD: 38 ML/MIN/1.73
GLOBULIN UR ELPH-MCNC: 2.9 GM/DL
GLUCOSE BLD-MCNC: 212 MG/DL (ref 65–99)
GLUCOSE BLDC GLUCOMTR-MCNC: 225 MG/DL (ref 70–130)
GLUCOSE BLDC GLUCOMTR-MCNC: 311 MG/DL (ref 70–130)
GLUCOSE BLDC GLUCOMTR-MCNC: 345 MG/DL (ref 70–130)
GLUCOSE BLDC GLUCOMTR-MCNC: 414 MG/DL (ref 70–130)
GLUCOSE BLDC GLUCOMTR-MCNC: 429 MG/DL (ref 70–130)
HCT VFR BLD AUTO: 34.2 % (ref 37.5–51)
HGB BLD-MCNC: 11.2 G/DL (ref 13–17.7)
IMM GRANULOCYTES # BLD AUTO: 0.05 10*3/MM3 (ref 0–0.05)
IMM GRANULOCYTES NFR BLD AUTO: 0.7 % (ref 0–0.5)
LYMPHOCYTES # BLD AUTO: 1.37 10*3/MM3 (ref 0.7–3.1)
LYMPHOCYTES NFR BLD AUTO: 18.6 % (ref 19.6–45.3)
MCH RBC QN AUTO: 28.1 PG (ref 26.6–33)
MCHC RBC AUTO-ENTMCNC: 32.7 G/DL (ref 31.5–35.7)
MCV RBC AUTO: 85.9 FL (ref 79–97)
MONOCYTES # BLD AUTO: 0.85 10*3/MM3 (ref 0.1–0.9)
MONOCYTES NFR BLD AUTO: 11.5 % (ref 5–12)
NEUTROPHILS # BLD AUTO: 4.47 10*3/MM3 (ref 1.7–7)
NEUTROPHILS NFR BLD AUTO: 60.8 % (ref 42.7–76)
NRBC BLD AUTO-RTO: 0 /100 WBC (ref 0–0.2)
PLATELET # BLD AUTO: 263 10*3/MM3 (ref 140–450)
PMV BLD AUTO: 10.9 FL (ref 6–12)
POTASSIUM BLD-SCNC: 5 MMOL/L (ref 3.5–5.2)
PROT SERPL-MCNC: 6.8 G/DL (ref 6–8.5)
RBC # BLD AUTO: 3.98 10*6/MM3 (ref 4.14–5.8)
SODIUM BLD-SCNC: 136 MMOL/L (ref 136–145)
WBC NRBC COR # BLD: 7.36 10*3/MM3 (ref 3.4–10.8)

## 2019-11-05 PROCEDURE — 63710000001 INSULIN DETEMIR PER 5 UNITS: Performed by: FAMILY MEDICINE

## 2019-11-05 PROCEDURE — 97110 THERAPEUTIC EXERCISES: CPT

## 2019-11-05 PROCEDURE — 25010000002 PROMETHAZINE PER 50 MG: Performed by: FAMILY MEDICINE

## 2019-11-05 PROCEDURE — 99232 SBSQ HOSP IP/OBS MODERATE 35: CPT | Performed by: PODIATRIST

## 2019-11-05 PROCEDURE — 63710000001 INSULIN LISPRO (HUMAN) PER 5 UNITS: Performed by: FAMILY MEDICINE

## 2019-11-05 PROCEDURE — 85025 COMPLETE CBC W/AUTO DIFF WBC: CPT | Performed by: FAMILY MEDICINE

## 2019-11-05 PROCEDURE — 25010000002 ONDANSETRON PER 1 MG: Performed by: FAMILY MEDICINE

## 2019-11-05 PROCEDURE — 97116 GAIT TRAINING THERAPY: CPT

## 2019-11-05 PROCEDURE — 25010000002 ENOXAPARIN PER 10 MG: Performed by: FAMILY MEDICINE

## 2019-11-05 PROCEDURE — 82962 GLUCOSE BLOOD TEST: CPT

## 2019-11-05 PROCEDURE — 74018 RADEX ABDOMEN 1 VIEW: CPT

## 2019-11-05 PROCEDURE — 80053 COMPREHEN METABOLIC PANEL: CPT | Performed by: FAMILY MEDICINE

## 2019-11-05 RX ORDER — PROMETHAZINE HYDROCHLORIDE 25 MG/ML
12.5 INJECTION, SOLUTION INTRAMUSCULAR; INTRAVENOUS ONCE
Status: COMPLETED | OUTPATIENT
Start: 2019-11-05 | End: 2019-11-05

## 2019-11-05 RX ORDER — ONDANSETRON 2 MG/ML
4 INJECTION INTRAMUSCULAR; INTRAVENOUS EVERY 4 HOURS PRN
Status: DISCONTINUED | OUTPATIENT
Start: 2019-11-05 | End: 2019-11-06 | Stop reason: HOSPADM

## 2019-11-05 RX ORDER — BUMETANIDE 0.5 MG/1
0.5 TABLET ORAL DAILY
Status: DISCONTINUED | OUTPATIENT
Start: 2019-11-06 | End: 2019-11-06 | Stop reason: HOSPADM

## 2019-11-05 RX ADMIN — INSULIN LISPRO 24 UNITS: 100 INJECTION, SOLUTION INTRAVENOUS; SUBCUTANEOUS at 20:19

## 2019-11-05 RX ADMIN — GABAPENTIN 100 MG: 100 CAPSULE ORAL at 09:39

## 2019-11-05 RX ADMIN — Medication 250 MG: at 09:36

## 2019-11-05 RX ADMIN — PROMETHAZINE HYDROCHLORIDE 12.5 MG: 25 INJECTION INTRAMUSCULAR; INTRAVENOUS at 12:42

## 2019-11-05 RX ADMIN — ENOXAPARIN SODIUM 30 MG: 30 INJECTION SUBCUTANEOUS at 20:30

## 2019-11-05 RX ADMIN — SODIUM CHLORIDE, PRESERVATIVE FREE 10 ML: 5 INJECTION INTRAVENOUS at 08:35

## 2019-11-05 RX ADMIN — OXYCODONE HYDROCHLORIDE AND ACETAMINOPHEN 1 TABLET: 7.5; 325 TABLET ORAL at 02:35

## 2019-11-05 RX ADMIN — PANTOPRAZOLE SODIUM 40 MG: 40 TABLET, DELAYED RELEASE ORAL at 20:19

## 2019-11-05 RX ADMIN — OXYCODONE HYDROCHLORIDE AND ACETAMINOPHEN 1 TABLET: 7.5; 325 TABLET ORAL at 15:10

## 2019-11-05 RX ADMIN — NYSTATIN: 100000 POWDER TOPICAL at 20:19

## 2019-11-05 RX ADMIN — ONDANSETRON HYDROCHLORIDE 4 MG: 2 SOLUTION INTRAMUSCULAR; INTRAVENOUS at 02:35

## 2019-11-05 RX ADMIN — DULOXETINE HYDROCHLORIDE 60 MG: 30 CAPSULE, DELAYED RELEASE ORAL at 09:35

## 2019-11-05 RX ADMIN — INSULIN LISPRO 16 UNITS: 100 INJECTION, SOLUTION INTRAVENOUS; SUBCUTANEOUS at 12:35

## 2019-11-05 RX ADMIN — OXYCODONE HYDROCHLORIDE AND ACETAMINOPHEN 1 TABLET: 7.5; 325 TABLET ORAL at 08:34

## 2019-11-05 RX ADMIN — BUMETANIDE 1 MG: 1 TABLET ORAL at 09:35

## 2019-11-05 RX ADMIN — GABAPENTIN 100 MG: 100 CAPSULE ORAL at 20:19

## 2019-11-05 RX ADMIN — POLYETHYLENE GLYCOL 3350 17 G: 17 POWDER, FOR SOLUTION ORAL at 09:36

## 2019-11-05 RX ADMIN — INSULIN LISPRO 8 UNITS: 100 INJECTION, SOLUTION INTRAVENOUS; SUBCUTANEOUS at 08:40

## 2019-11-05 RX ADMIN — LACTULOSE 20 G: 20 SOLUTION ORAL at 20:19

## 2019-11-05 RX ADMIN — INSULIN LISPRO 16 UNITS: 100 INJECTION, SOLUTION INTRAVENOUS; SUBCUTANEOUS at 17:38

## 2019-11-05 RX ADMIN — LATANOPROST 1 DROP: 50 SOLUTION OPHTHALMIC at 20:19

## 2019-11-05 RX ADMIN — ONDANSETRON HYDROCHLORIDE 4 MG: 2 SOLUTION INTRAMUSCULAR; INTRAVENOUS at 08:34

## 2019-11-05 RX ADMIN — CLINDAMYCIN IN 5 PERCENT DEXTROSE 900 MG: 18 INJECTION, SOLUTION INTRAVENOUS at 11:22

## 2019-11-05 RX ADMIN — Medication 250 MG: at 20:19

## 2019-11-05 RX ADMIN — CLINDAMYCIN IN 5 PERCENT DEXTROSE 900 MG: 18 INJECTION, SOLUTION INTRAVENOUS at 20:19

## 2019-11-05 RX ADMIN — SODIUM CHLORIDE, PRESERVATIVE FREE 10 ML: 5 INJECTION INTRAVENOUS at 20:19

## 2019-11-05 RX ADMIN — CLINDAMYCIN IN 5 PERCENT DEXTROSE 900 MG: 18 INJECTION, SOLUTION INTRAVENOUS at 02:31

## 2019-11-05 RX ADMIN — INSULIN DETEMIR 20 UNITS: 100 INJECTION, SOLUTION SUBCUTANEOUS at 08:34

## 2019-11-05 RX ADMIN — LOSARTAN POTASSIUM 50 MG: 50 TABLET, FILM COATED ORAL at 09:35

## 2019-11-05 RX ADMIN — INSULIN DETEMIR 20 UNITS: 100 INJECTION, SOLUTION SUBCUTANEOUS at 20:18

## 2019-11-05 RX ADMIN — OXYCODONE HYDROCHLORIDE AND ACETAMINOPHEN 1 TABLET: 7.5; 325 TABLET ORAL at 22:48

## 2019-11-05 RX ADMIN — ASPIRIN 81 MG: 81 TABLET ORAL at 09:35

## 2019-11-05 RX ADMIN — ROSUVASTATIN CALCIUM 40 MG: 20 TABLET, FILM COATED ORAL at 09:34

## 2019-11-05 RX ADMIN — NYSTATIN: 100000 POWDER TOPICAL at 09:42

## 2019-11-05 RX ADMIN — LACTULOSE 20 G: 20 SOLUTION ORAL at 09:36

## 2019-11-05 NOTE — PROGRESS NOTES
Nephrology ([facility]) Progress Note      Patient:  Erick Luong  YOB: 1955  Date of Service: 11/5/2019  MRN: [id number]   Acct: [id number]   Primary Care Physician: Del Shetty MD  Advance Directive:   Code Status and Medical Interventions:   Ordered at: 10/27/19 2021     Level Of Support Discussed With:    Patient     Code Status:    CPR     Medical Interventions (Level of Support Prior to Arrest):    Full     Admit Date: 10/27/2019       Hospital Day: 9  Referring Provider: Abebe Garzon DO      Patient personally seen and examined.  Complete chart including Consults, Notes, Operative Reports, Labs, Cardiology, and Radiology studies reviewed as able.        Subjective:  Erick Luong is a 64 y.o. male  whom we were consulted for acute kidney injury.  Baseline chronic kidney disease stage 3, baseline creatinine 1.3.  Has followed with our office on an irregular basis in the past.  History of type 2 diabetes, hypertension, congestive heart failure.  Undergoing treatment for chronic right foot wound; has PICC line in place and has been getting IV Vancomycin.  Last few days has developed increased edema, dyspnea. Unable to lay flat at night.  Diminished urine output.  Hospital course remarkable for initiation of Bumex drip with excellent response. Transitioned to intermittent IV dose Bumex on 10/29.  Night of 10/29 had apparent allergic reaction to Vancomycin; required a rapid response call.  Has been maintaining good output with intermittent Bumex    Today complaint of nausea.  Edema resolving.  No new overnight issues. Urine output nonoliguric    Allergies:  Bactrim [sulfamethoxazole-trimethoprim] and Vancomycin    Home Meds:  Medications Prior to Admission   Medication Sig Dispense Refill Last Dose   • aspirin 81 MG EC tablet Take 81 mg by mouth Daily.   10/27/2019 at Unknown time   • bumetanide (BUMEX) 2 MG tablet Take 1 tablet by mouth 2 (Two) Times a Day As  Needed (edema). (Patient taking differently: Take 2 mg by mouth 2 (Two) Times a Day.) 30 tablet 2 10/27/2019 at Unknown time   • DULoxetine (CYMBALTA) 60 MG capsule Take 60 mg by mouth Daily.   10/27/2019 at Unknown time   • gabapentin (NEURONTIN) 100 MG capsule Take 100 mg by mouth 2 (Two) Times a Day.   10/27/2019 at Unknown time   • insulin glargine (LANTUS) 100 UNIT/ML injection Inject 20 Units under the skin into the appropriate area as directed Every Night.   10/27/2019 at Unknown time   • insulin regular (humuLIN R) 500 UNIT/ML CONCENTRATED injection Inject 100 Units under the skin 3 (Three) Times a Day Before Meals. Packaging states 100 units with regular meals; 120 units with large meals or 360 units daily   10/27/2019 at Unknown time   • lactulose (CHRONULAC) 10 GM/15ML solution Take 20 g by mouth 3 (Three) Times a Day As Needed.   10/27/2019 at Unknown time   • latanoprost (XALATAN) 0.005 % ophthalmic solution Administer 1 drop to both eyes Every Night.   10/27/2019 at Unknown time   • losartan (COZAAR) 100 MG tablet Take 100 mg by mouth Daily.   10/27/2019 at Unknown time   • ondansetron ODT (ZOFRAN-ODT) 8 MG disintegrating tablet Take 8 mg by mouth Every 6 (Six) Hours As Needed for Nausea or Vomiting.   10/27/2019 at Unknown time   • oxyCODONE-acetaminophen (PERCOCET)  MG per tablet Take 1 tablet by mouth 2 (Two) Times a Day.   10/27/2019 at Unknown time   • pantoprazole (PROTONIX) 40 MG EC tablet Take 1 tablet by mouth Daily. 90 tablet 3 10/27/2019 at Unknown time   • rosuvastatin (CRESTOR) 40 MG tablet Take 40 mg by mouth Daily.   10/27/2019 at Unknown time   • saccharomyces boulardii (FLORASTOR) 250 MG capsule Take 250 mg by mouth 2 (Two) Times a Day.   10/27/2019 at Unknown time   • traZODone (DESYREL) 50 MG tablet Take 25-50 mg by mouth At Night As Needed for Sleep.   10/26/2019 at Unknown time       Medicines:  Current Facility-Administered Medications   Medication Dose Route Frequency  Provider Last Rate Last Dose   • acetaminophen (TYLENOL) tablet 650 mg  650 mg Oral Q4H PRN Abebe Garzon DO        Or   • acetaminophen (TYLENOL) 160 MG/5ML solution 650 mg  650 mg Oral Q4H PRN Abebe Garzon DO        Or   • acetaminophen (TYLENOL) suppository 650 mg  650 mg Rectal Q4H PRN Abebe Garzon DO       • aspirin EC tablet 81 mg  81 mg Oral Daily Abebe Garzon DO   81 mg at 11/05/19 0935   • bisacodyl (DULCOLAX) suppository 10 mg  10 mg Rectal Daily PRN Raquel Duncan, APRN   10 mg at 11/01/19 0101   • bumetanide (BUMEX) tablet 1 mg  1 mg Oral Daily Stewart Alvarez, APRN   1 mg at 11/05/19 0935   • clindamycin (CLEOCIN) 900 mg in dextrose 5% 50 mL IVPB (premix)  900 mg Intravenous Q8H Julia Adrian MD 50 mL/hr at 11/05/19 0231 900 mg at 11/05/19 0231   • dextrose (D50W) 25 g/ 50mL Intravenous Solution 25 g  25 g Intravenous Q15 Min PRN Telly Rodriguez MD       • dextrose (GLUTOSE) oral gel 15 g  15 g Oral Q15 Min PRN Telly Rodriguez MD       • diphenhydrAMINE (BENADRYL) injection 25 mg  25 mg Intravenous Q6H PRN Raquel Duncan, APRN   25 mg at 10/29/19 2336   • DULoxetine (CYMBALTA) DR capsule 60 mg  60 mg Oral Daily Abebe Garzon DO   60 mg at 11/05/19 0935   • enoxaparin (LOVENOX) syringe 30 mg  30 mg Subcutaneous Q24H Telly Rodriguez MD   30 mg at 11/04/19 2036   • gabapentin (NEURONTIN) capsule 100 mg  100 mg Oral BID Abebe Garzon DO   100 mg at 11/05/19 0939   • glucagon (human recombinant) (GLUCAGEN DIAGNOSTIC) injection 1 mg  1 mg Subcutaneous Q15 Min PRN Telly Rodriguez MD       • Influenza Vac Subunit Quad (FLUCELVAX) injection 0.5 mL  0.5 mL Intramuscular During Hospitalization Abebe Garzon DO       • insulin detemir (LEVEMIR) injection 20 Units  20 Units Subcutaneous Q12H Telly Rodriguez MD   20 Units at 11/05/19 0834   • insulin lispro (humaLOG) injection 0-24 Units  0-24 Units Subcutaneous 4x Daily With Meals & Nightly Telly Rodriguez  MD LASHAWN   8 Units at 11/05/19 0840   • ipratropium-albuterol (DUO-NEB) nebulizer solution 3 mL  3 mL Nebulization Q6H PRN Abebe Garzon DO       • lactulose solution 20 g  20 g Oral BID Abebe Garzon DO   20 g at 11/05/19 0936   • latanoprost (XALATAN) 0.005 % ophthalmic solution 1 drop  1 drop Both Eyes Nightly Telly Rodriguez MD   1 drop at 11/04/19 2036   • magnesium hydroxide (MILK OF MAGNESIA) suspension 2400 mg/10mL 10 mL  10 mL Oral Daily PRN Raquel Duncan APRN   10 mL at 11/04/19 0914   • methylnaltrexone (RELISTOR) injection 8 mg  8 mg Subcutaneous Every Other Day Abebe Garzon DO   8 mg at 11/04/19 0945   • nystatin (MYCOSTATIN) powder   Topical Q12H Raquel Duncan, APRN       • ondansetron (ZOFRAN) injection 4 mg  4 mg Intravenous Q6H PRN Telly Rodriguez MD   4 mg at 11/05/19 0834   • oxyCODONE-acetaminophen (PERCOCET) 7.5-325 MG per tablet 1 tablet  1 tablet Oral Q4H PRN Telly Rodriguez MD   1 tablet at 11/05/19 0834   • pantoprazole (PROTONIX) EC tablet 40 mg  40 mg Oral Nightly Telly Rodriguez MD   40 mg at 11/04/19 2036   • polyethylene glycol 3350 powder (packet)  17 g Oral Daily Telly Rodriguez MD   17 g at 11/05/19 0936   • rosuvastatin (CRESTOR) tablet 40 mg  40 mg Oral Daily Abebe Garzon DO   40 mg at 11/05/19 0934   • saccharomyces boulardii (FLORASTOR) capsule 250 mg  250 mg Oral BID Abebe Garzon DO   250 mg at 11/05/19 0936   • sodium chloride 0.9 % flush 10 mL  10 mL Intravenous PRN Reuben Tadeo MD       • sodium chloride 0.9 % flush 10 mL  10 mL Intravenous Q12H Aebbe Garzon DO   10 mL at 11/05/19 0835   • sodium chloride 0.9 % flush 10 mL  10 mL Intravenous PRN Abebe Garzon,        • traZODone (DESYREL) tablet 25 mg  25 mg Oral Nightly PRN Abebe Garzon, DO           Past Medical History:  Past Medical History:   Diagnosis Date   • Arthritis    • CHF (congestive heart failure) (CMS/HCC)    • Coronary artery disease    •  Diabetes mellitus (CMS/HCC)    • Hyperlipidemia    • Hypertension    • Myocardial infarction (CMS/HCC)    • Pancreatitis    • Renal disorder    • Sleep apnea with use of continuous positive airway pressure (CPAP)        Past Surgical History:  Past Surgical History:   Procedure Laterality Date   • ABDOMINAL SURGERY     • APPENDECTOMY     • BACK SURGERY     • CARDIAC SURGERY     • CATARACT EXTRACTION     • CERVICAL SPINE SURGERY     • COLONOSCOPY N/A 1/31/2017    Normal exam repeat in 5 years   • COLONOSCOPY N/A 2/11/2019    Procedure: COLONOSCOPY WITH ANESTHESIA;  Surgeon: Tyrell Smith MD;  Location: [facility] ENDOSCOPY;  Service: Gastroenterology   • COLONOSCOPY W/ POLYPECTOMY  03/04/2014    Hyperplastic polyp   • CORONARY ARTERY BYPASS GRAFT  10/2015   • ENDOSCOPY  04/13/2011    Gastritis with hemorrhage   • ENDOSCOPY N/A 5/5/2017    Normal exam   • ENDOSCOPY N/A 2/11/2019    Procedure: ESOPHAGOGASTRODUODENOSCOPY WITH ANESTHESIA;  Surgeon: Tyrell Smith MD;  Location: [facility] ENDOSCOPY;  Service: Gastroenterology   • INCISION AND DRAINAGE OF WOUND Left 09/2007    spider bite       Family History  Family History   Problem Relation Age of Onset   • Colon cancer Father    • Heart disease Father    • Colon cancer Sister    • Colon polyps Sister    • Alzheimer's disease Mother    • Coronary artery disease Sister    • Coronary artery disease Sister        Social History  Social History     Socioeconomic History   • Marital status:      Spouse name: Not on file   • Number of children: Not on file   • Years of education: Not on file   • Highest education level: Not on file   Tobacco Use   • Smoking status: Never Smoker   • Smokeless tobacco: Never Used   • Tobacco comment: smoked in highschool   Substance and Sexual Activity   • Alcohol use: No   • Drug use: No   • Sexual activity: Defer         Review of Systems:  History obtained from chart review and the patient  General ROS: Patient complains of fatigue  and No fever or chills  Respiratory ROS: No cough, shortness of breath, wheezing  Cardiovascular ROS: positive for - dyspnea on exertion and edema  No chest pain or palpitations  Gastrointestinal ROS: No abdominal pain or melena  Genito-Urinary ROS: No dysuria or hematuria  Neurological ROS: no headache or dizziness    Objective:  Patient Vitals for the past 24 hrs:   BP Temp Temp src Pulse Resp SpO2 Weight   11/05/19 1013 130/61 97.7 °F (36.5 °C) Oral 84 14 98 % --   11/05/19 0623 -- -- -- -- -- -- (!) 146 kg (322 lb)   11/04/19 2138 138/71 98.3 °F (36.8 °C) Oral 64 18 99 % --   11/04/19 1806 136/65 98.9 °F (37.2 °C) Oral 82 18 98 % --   11/04/19 1456 113/44 98.2 °F (36.8 °C) Oral 84 18 96 % --   11/04/19 1119 121/64 98.4 °F (36.9 °C) Oral 80 18 94 % --       Intake/Output Summary (Last 24 hours) at 11/5/2019 1056  Last data filed at 11/5/2019 0536  Gross per 24 hour   Intake 910 ml   Output 2300 ml   Net -1390 ml     General: awake/alert    Neck: supple, no JVD  Chest:  clear to auscultation bilaterally without respiratory distress  CVS: regular rate and rhythm  Abdominal: rounded and soft, nontender, positive bowel sounds  Extremities: trace lower extremity edema  Skin: warm and dry without rash   Neuro: no focal motor deficits      Labs:  Results from last 7 days   Lab Units 11/05/19 0559 11/04/19 0713 11/03/19 0611   WBC 10*3/mm3 7.36 6.91 7.78   HEMOGLOBIN g/dL 11.2* 11.3* 11.0*   HEMATOCRIT % 34.2* 34.1* 33.3*   PLATELETS 10*3/mm3 263 299 289         Results from last 7 days   Lab Units 11/05/19 0559 11/04/19 0713 11/03/19  0611   SODIUM mmol/L 136 136 136   POTASSIUM mmol/L 5.0 5.1 5.1   CHLORIDE mmol/L 96* 96* 98   CO2 mmol/L 32.0* 31.0* 27.0   BUN mg/dL 33* 33* 40*   CREATININE mg/dL 1.81* 1.73* 1.49*   CALCIUM mg/dL 8.9 9.2 9.1   BILIRUBIN mg/dL 0.4 0.4 0.4   ALK PHOS U/L 69 75 72   ALT (SGPT) U/L 13 15 11   AST (SGOT) U/L 15 16 13   GLUCOSE mg/dL 212* 241* 261*       Radiology:   Imaging Results  (last 72 hours)     Procedure Component Value Units Date/Time    CT Angiogram Chest [[id number]] Collected:  10/27/19 1906     Updated:  10/27/19 1911    Narrative:       CT ANGIOGRAM CHEST- 10/27/2019 6:32 PM CDT      HISTORY: Chest pain, acute, PE suspected, intermed prob, positive  D-dimer      COMPARISON: 01/18/2018.      DOSE LENGTH PRODUCT: 525 mGy cm. Automated exposure control was also  utilized to decrease patient radiation dose.     TECHNIQUE: Helical tomographic images of the chest were obtained after  the administration of intravenous contrast following angiogram protocol.  Additionally, 3D and multiplanar reformatted images were provided.        FINDINGS:    Pulmonary arteries: There is adequate enhancement of the pulmonary  arteries to evaluate for central and segmental pulmonary emboli. There  are no filling defects within the main, lobar, segmental or visualized  subsegmental pulmonary arteries. The pulmonary arteries are within  normal limits for size.      Aorta and great vessels: The aorta is well opacified and demonstrates no  dissection or aneurysm. The great vessels are normal in appearance.  Changes of previous CABG are noted.     Visualized neck base: The imaged portion of the base of the neck appears  unremarkable.      Lungs: Dependent changes are seen in both lung bases. There is no  pulmonary edema or focal consolidation. No worrisome nodules are  identified. Small pleural effusions are present bilaterally. The trachea  and bronchial tree are patent.      Heart: Mild dilation of the heart is again noted. There is no  pericardial effusion.      Mediastinum and lymph nodes: No enlarged mediastinal, hilar, or axillary  lymph nodes are present.      Skeletal and soft tissues: The osseous structures of the thorax and  surrounding soft tissues demonstrate no acute process.     Upper abdomen: The imaged portion of the upper abdomen demonstrates no  acute process.        Impression:       1. Small  pleural effusions may be due to volume overload.  2. No evidence of pulmonary embolus or pneumonia.        This report was finalized on 10/27/2019 19:08 by Dr. Sudarshan Starr MD.    CT Abdomen Pelvis With Contrast [[id number]] Collected:  10/27/19 1903     Updated:  10/27/19 1908    Narrative:       CT ABDOMEN PELVIS W CONTRAST- 10/27/2019 6:32 PM CDT     HISTORY: Abdominal pain, unspecified       COMPARISON: 10/12/2019.      DOSE LENGTH PRODUCT: 1919 mGy cm. Automated exposure control was also  utilized to decrease patient radiation dose.     TECHNIQUE: Following the intravenous administration of contrast, helical  CT tomographic images of the abdomen and pelvis were acquired.  Multiplanar reformatted images were provided for review.      FINDINGS:   LOWER CHEST: Findings in the chest will be described in a separate  dictation.      LIVER: No focal liver lesion. The hepatic vasculature is patent.      BILIARY SYSTEM: The gallbladder has been removed. There is no evidence  of biliary ductal dilation.      PANCREAS: There is mild atrophy in the pancreas.      SPLEEN: Unremarkable.      KIDNEYS: Simple cysts are seen in the RIGHT kidney. There has been no  significant interval change. There is no hydronephrosis. The LEFT kidney  is unremarkable. The ureters are decompressed and normal in appearance.     ADRENALS: Unremarkable.     RETROPERITONEUM: No mass, lymphadenopathy or hemorrhage.      GI TRACT: No evidence of obstruction or bowel wall thickening. The  appendix has been removed.     OTHER: There is no mesenteric mass, lymphadenopathy or fluid collection.  The abdominopelvic vasculature is patent. The osseous structures and  soft tissues demonstrate no worrisome lesions. Mild subcutaneous edema  is again noted in the lower abdominal wall.      PELVIS: No mass lesion, fluid collection or significant lymphadenopathy  is seen in the pelvis. The urinary bladder is normal in appearance.       Impression:       1. No  evidence of acute abdominopelvic process.   2. Subcutaneous edema in the anterior abdomen and in the dependent  portions of the hips and buttocks. Findings could be due to volume  overload.        This report was finalized on 10/27/2019 19:05 by Dr. Sudarshan Starr MD.    XR Chest 1 View [[id number]] Collected:  10/27/19 1640     Updated:  10/27/19 1644    Narrative:       Exam: XR CHEST 1 VW-     Indication: Chest Pain Triage Protocol     Comparison: 10/12/2019     Findings:     Cardiac silhouette is stable. Postoperative change of CABG. Chronic mild  right hemidiaphragm elevation. No definite consolidation, large pleural  effusion, or visible pneumothorax. Left upper extremity PICC is stable  in position. No acute osseous findings.       Impression:          No acute findings.  This report was finalized on 10/27/2019 16:41 by Dr. Santana Figueredo MD.          Culture:  Blood Culture   Date Value Ref Range Status   10/27/2019 No growth at 24 hours  Preliminary   10/27/2019 No growth at 24 hours  Preliminary         Assessment   1.  Acute kidney injury due to ATN--worse today  2.  Baseline chronic kidney disease stage 3  3.  Type 2 diabetes with nephropathy  4.  Essential hypertension  5.  Right foot wound  6.  Volume overload--resolving  7.  Anemia     Plan:  1.  Decrease dose Bumex to 0.5 mg daily  2.  Encourage PO fluids  3.  Monitor labs    Stewart Alvarez, SUSAN  11/5/2019  10:56 AM

## 2019-11-05 NOTE — PROGRESS NOTES
"INFECTIOUS DISEASES PROGRESS NOTE    Patient:  Erick Luong  YOB: 1955  MRN: [id number]   Admit date: 10/27/2019   Admitting Physician: Willy Rollins MD  Primary Care Physician: Del Shetty MD    Chief Complaint:  nausea      Interval History:   He still has some nausea.  One bowel movement after enema      Allergies:   Allergies   Allergen Reactions   • Bactrim [Sulfamethoxazole-Trimethoprim] Other (See Comments)     \"RENAL FAILURE\"   • Vancomycin Itching       Current Meds:     Current Facility-Administered Medications:   •  acetaminophen (TYLENOL) tablet 650 mg, 650 mg, Oral, Q4H PRN **OR** acetaminophen (TYLENOL) 160 MG/5ML solution 650 mg, 650 mg, Oral, Q4H PRN **OR** acetaminophen (TYLENOL) suppository 650 mg, 650 mg, Rectal, Q4H PRN, Abebe Garzon DO  •  aspirin EC tablet 81 mg, 81 mg, Oral, Daily, Abebe Garzon DO, 81 mg at 11/05/19 0935  •  bisacodyl (DULCOLAX) suppository 10 mg, 10 mg, Rectal, Daily PRN, Raquel Duncan, APRN, 10 mg at 11/01/19 0101  •  [START ON 11/6/2019] bumetanide (BUMEX) tablet 0.5 mg, 0.5 mg, Oral, Daily, Stewart Alvarez, APRN  •  clindamycin (CLEOCIN) 900 mg in dextrose 5% 50 mL IVPB (premix), 900 mg, Intravenous, Q8H, Julia Adrian MD, Last Rate: 50 mL/hr at 11/05/19 1122, 900 mg at 11/05/19 1122  •  dextrose (D50W) 25 g/ 50mL Intravenous Solution 25 g, 25 g, Intravenous, Q15 Min PRN, Telly Rodriguez MD  •  dextrose (GLUTOSE) oral gel 15 g, 15 g, Oral, Q15 Min PRN, Telly Rodriguez MD  •  diphenhydrAMINE (BENADRYL) injection 25 mg, 25 mg, Intravenous, Q6H PRN, Raquel Duncan, APRN, 25 mg at 10/29/19 2336  •  DULoxetine (CYMBALTA) DR capsule 60 mg, 60 mg, Oral, Daily, Abebe Garzon DO, 60 mg at 11/05/19 0935  •  enoxaparin (LOVENOX) syringe 30 mg, 30 mg, Subcutaneous, Q24H, Telly Rodriguez MD, 30 mg at 11/04/19 2036  •  gabapentin (NEURONTIN) capsule 100 mg, 100 mg, Oral, BID, Abebe Garzon DO, 100 mg at " 11/05/19 0939  •  glucagon (human recombinant) (GLUCAGEN DIAGNOSTIC) injection 1 mg, 1 mg, Subcutaneous, Q15 Min PRN, Telly Rodriguez MD  •  Influenza Vac Subunit Quad (FLUCELVAX) injection 0.5 mL, 0.5 mL, Intramuscular, During Hospitalization, Abebe Garzon DO  •  insulin detemir (LEVEMIR) injection 20 Units, 20 Units, Subcutaneous, Q12H, Telly Rodriguez MD, 20 Units at 11/05/19 0834  •  insulin lispro (humaLOG) injection 0-24 Units, 0-24 Units, Subcutaneous, 4x Daily With Meals & Nightly, Telly Rodriguez MD, 16 Units at 11/05/19 1235  •  ipratropium-albuterol (DUO-NEB) nebulizer solution 3 mL, 3 mL, Nebulization, Q6H PRN, Abebe Garzon DO  •  lactulose solution 20 g, 20 g, Oral, BID, Abebe Garzon DO, 20 g at 11/05/19 0936  •  latanoprost (XALATAN) 0.005 % ophthalmic solution 1 drop, 1 drop, Both Eyes, Nightly, Telly Rodriguez MD, 1 drop at 11/04/19 2036  •  magnesium hydroxide (MILK OF MAGNESIA) suspension 2400 mg/10mL 10 mL, 10 mL, Oral, Daily PRN, Raquel Duncan, APRN, 10 mL at 11/04/19 0914  •  methylnaltrexone (RELISTOR) injection 8 mg, 8 mg, Subcutaneous, Every Other Day, Abebe Garzon DO, 8 mg at 11/04/19 0945  •  nystatin (MYCOSTATIN) powder, , Topical, Q12H, Raquel Duncan, APRN  •  ondansetron (ZOFRAN) injection 4 mg, 4 mg, Intravenous, Q4H PRN, Willy Rollins MD  •  oxyCODONE-acetaminophen (PERCOCET) 7.5-325 MG per tablet 1 tablet, 1 tablet, Oral, Q4H PRN, Telly Rodriguez MD, 1 tablet at 11/05/19 0834  •  pantoprazole (PROTONIX) EC tablet 40 mg, 40 mg, Oral, Nightly, Telly Rodriguez MD, 40 mg at 11/04/19 2036  •  polyethylene glycol 3350 powder (packet), 17 g, Oral, Daily, Telly Rodriguez MD, 17 g at 11/05/19 0936  •  rosuvastatin (CRESTOR) tablet 40 mg, 40 mg, Oral, Daily, Abebe Garzon DO, 40 mg at 11/05/19 0934  •  saccharomyces boulardii (FLORASTOR) capsule 250 mg, 250 mg, Oral, BID, Abebe Garzon DO, 250 mg at 11/05/19 0936  •  sodium chloride 0.9 %  "flush 10 mL, 10 mL, Intravenous, PRN, Reuben Tadeo MD  •  sodium chloride 0.9 % flush 10 mL, 10 mL, Intravenous, Q12H, Abebe Garzon DO, 10 mL at 19 0835  •  sodium chloride 0.9 % flush 10 mL, 10 mL, Intravenous, PRN, Abebe Garzon DO  •  traZODone (DESYREL) tablet 25 mg, 25 mg, Oral, Nightly PRN, Abebe Garzon DO      Review of Systems   Constitutional: Negative for fever.   Gastrointestinal: Positive for constipation and nausea.       Objective     Vital Signs:  Temp (24hrs), Av.3 °F (36.8 °C), Min:97.7 °F (36.5 °C), Max:98.9 °F (37.2 °C)      /61 (BP Location: Right arm, Patient Position: Lying)   Pulse 84   Temp 97.7 °F (36.5 °C) (Oral)   Resp 14   Ht 182.9 cm (72\")   Wt (!) 146 kg (322 lb)   SpO2 98%   BMI 43.67 kg/m²         Physical Exam   General: The patient is an obese gentleman sitting in a recliner in no acute distress.  Sania RN is at bedside  HEENT: Sclera anicteric and noninjected  Abdomen: Obese, no focal, no rebound or guarding  Extremities:    Wound evaluated.  Well-circumscribed.  Was not probed.  No necrotic tissue.  No significant slough.  There were only 3 tiny red areas and could not actually tell from his granulation tissue.  No surrounding cellulitis  Psych: He is pleasant and cooperative  Neuro: Alert and oriented, speech is clear.      Line/IV site: PICC upper arm left, condition patent and no redness.      Results Review:    I reviewed the patient's new clinical results.    Lab Results:  CBC:   Lab Results   Lab 10/30/19  0549 10/31/19  0540 19  0736 19  0628 19  0611 19  0713 19  0559   WBC 11.16* 10.53 10.85* 7.77 7.78 6.91 7.36   HEMOGLOBIN 11.4* 11.3* 11.2* 11.0* 11.0* 11.3* 11.2*   HEMATOCRIT 33.5* 33.7* 34.1* 33.7* 33.3* 34.1* 34.2*   PLATELETS 356 397 339 307 289 299 263         CMP:   Lab Results   Lab 19  0611 19  0713 19  0559   SODIUM 136 136 136   POTASSIUM 5.1 5.1 5.0   CHLORIDE " 98 96* 96*   CO2 27.0 31.0* 32.0*   BUN 40* 33* 33*   CREATININE 1.49* 1.73* 1.81*   CALCIUM 9.1 9.2 8.9   BILIRUBIN 0.4 0.4 0.4   ALK PHOS 72 75 69   ALT (SGPT) 11 15 13   AST (SGOT) 13 16 15   GLUCOSE 261* 241* 212*         Culture Results:        Microbiology Results Abnormal     Procedure Component Value - Date/Time    Blood Culture - Blood, Hand, Right [[id number]] Collected:  10/27/19 1815    Lab Status:  Final result Specimen:  Blood from Hand, Right Updated:  11/01/19 1845     Blood Culture No growth at 5 days    Blood Culture - Blood, Hand, Right [[id number]] Collected:  10/27/19 1655    Lab Status:  Final result Specimen:  Blood from Hand, Right Updated:  11/01/19 1715     Blood Culture No growth at 5 days          September cultures per Dr. Cerrato  Specimen Information: Foot, Right; Tissue           Tissue Culture     Lab   Light growth (2+) Staphylococcus aureus, MRSA Abnormal    [facility]     Methicillin resistant Staphylococcus aureus, Patient may be an isolation risk.          Gram Stain    Lab   Occasional WBCs seen  [facility]       No organisms seen  [facility]             Susceptibility               Staphylococcus aureus, MRSA       MATT       Clindamycin 0.25 ug/ml Susceptible       Erythromycin 4 ug/ml Intermediate       Inducible Clindamycin Resistance NEG ug/ml Negative       Oxacillin >=4 ug/ml Resistant       Penicillin G >=0.5 ug/ml Resistant       Rifampin <=0.5 ug/ml Susceptible       Tetracycline <=1 ug/ml Susceptible       Trimethoprim + Sulfamethoxazole <=10 ug/ml Susceptible       Vancomycin <=0.5 ug/ml Susceptible                 Susceptibility Comments      Staphylococcus aureus, MRSA   This isolate does not demonstrate inducible clindamycin resistance in vitro.          Specimen Collected: 09/19/19 12:06 Last Resulted: 09/22/19 10:57 Order Details View Encounter Lab                   Radiology:   Imaging Results (Last 72 Hours)     ** No results found for the last 72 hours. **           Assessment/Plan     Active Hospital Problems    Diagnosis   • Morbid obesity with BMI of 40.0-44.9, adult (CMS/[facility])   • WANDER (acute kidney injury) (CMS/[facility])   • Anemia due to chronic kidney disease   • Diabetic foot infection (CMS/[facility])   • Abdominal pain   • Type 2 diabetes mellitus with hyperglycemia, with long-term current use of insulin (CMS/[facility])     hold insulin the am of procedure     • CAD (coronary artery disease)   • Chronic diastolic congestive heart failure (CMS/[facility])   • Diabetes mellitus (CMS/[facility])       IMPRESSION:  1. Diabetic foot infection with ulcer and underlying osteomyelitis the right foot-followed by Dr. Cerrato as an outpatient.  Patient had been on intravenous vancomycin for this osteomyelitis since October 8 and plan was for 6-week course of therapy.  He has developed a reaction to vancomycin with itching and shortness of breath and a rapid response was called.  It is now listed as an allergy.  He was switched to IV clindamycin October 30  2. Poorly controlled diabetes-blood glucose in 200-300s  3. Chronic kidney disease stage III with baseline creatinine 1.3 per nephrology-worsening  4. Constipation- per attending.    5. Hypertension  6. Congestive heart failure        RECOMMENDATION:   Continue clindamycin.  -Plan previously was for stop date November 18  Discussed with nursing re-consideration of additional enemas as patient's constipation may be significantly contributing to his nausea      Per Dr. Cerrato's note patient started antibiotic therapy on October 8 and plan was for 6-week course. ( nov 18th end date)  Patient was also going to hyperbaric oxygen therapy.  He was okay with change to clindamycin or doxycycline.        Julia Adrian MD  11/05/19  12:44 PM

## 2019-11-05 NOTE — PROGRESS NOTES
[facility] Medicine Services  INPATIENT PROGRESS NOTE    Patient Name: Erick Luong  Date of Admission: 10/27/2019  Today's Date: 11/05/19  Length of Stay: 9  Primary Care Physician: Del Shetty MD    Subjective   Chief Complaint: Doing ok  HPI   Doing well, no complaints, minimal leg swelling  Afebrile, no foot pain at present        Review of Systems   Constitutional: Negative for fatigue and fever.   HENT: Negative for congestion and ear pain.    Eyes: Negative for redness and visual disturbance.   Respiratory: Negative for cough, shortness of breath and wheezing.    Cardiovascular: Negative for chest pain and palpitations.   Gastrointestinal: Negative for abdominal pain, diarrhea, nausea and vomiting.   Endocrine: Negative for cold intolerance and heat intolerance.   Genitourinary: Negative for dysuria and frequency.   Musculoskeletal: Negative for arthralgias and back pain.   Skin: Negative for rash and wound.   Neurological: Negative for dizziness and headaches.   Psychiatric/Behavioral: Negative for confusion. The patient is not nervous/anxious.         All pertinent negatives and positives are as above. All other systems have been reviewed and are negative unless otherwise stated.     Objective    Temp:  [97.7 °F (36.5 °C)-98.9 °F (37.2 °C)] 97.7 °F (36.5 °C)  Heart Rate:  [64-84] 84  Resp:  [14-18] 14  BP: (113-138)/(44-71) 130/61  Physical Exam   Constitutional: He is oriented to person, place, and time. He appears well-developed and well-nourished.   HENT:   Head: Normocephalic and atraumatic.   Right Ear: External ear normal.   Left Ear: External ear normal.   Nose: Nose normal.   Mouth/Throat: Oropharynx is clear and moist.   Eyes: Conjunctivae and EOM are normal. Pupils are equal, round, and reactive to light. Right eye exhibits no discharge. Left eye exhibits no discharge. No scleral icterus.   Neck: Normal range of motion. Neck supple. No tracheal deviation  present. No thyromegaly present.   Cardiovascular: Normal rate, regular rhythm, normal heart sounds and intact distal pulses. Exam reveals no gallop and no friction rub.   No murmur heard.  Trace BLE edema   Pulmonary/Chest: Effort normal and breath sounds normal. No stridor. No respiratory distress. He has no wheezes. He has no rales. He exhibits no tenderness.   Abdominal: Soft. Bowel sounds are normal. He exhibits no distension and no mass. There is no tenderness. There is no rebound and no guarding. No hernia.   Musculoskeletal: Normal range of motion. He exhibits no edema or deformity.   Lymphadenopathy:     He has no cervical adenopathy.   Neurological: He is alert and oriented to person, place, and time. He has normal reflexes. He displays normal reflexes. No cranial nerve deficit. He exhibits normal muscle tone. Coordination normal.   Skin: Skin is warm and dry. No rash noted. No erythema. No pallor.   Psychiatric: He has a normal mood and affect. His behavior is normal. Judgment and thought content normal.   Vitals reviewed.          Results Review:  I have reviewed the labs, radiology results, and diagnostic studies.    Laboratory Data:   Results from last 7 days   Lab Units 11/05/19  0559 11/04/19  0713 11/03/19  0611   WBC 10*3/mm3 7.36 6.91 7.78   HEMOGLOBIN g/dL 11.2* 11.3* 11.0*   HEMATOCRIT % 34.2* 34.1* 33.3*   PLATELETS 10*3/mm3 263 299 289        Results from last 7 days   Lab Units 11/05/19  0559 11/04/19  0713 11/03/19  0611   SODIUM mmol/L 136 136 136   POTASSIUM mmol/L 5.0 5.1 5.1   CHLORIDE mmol/L 96* 96* 98   CO2 mmol/L 32.0* 31.0* 27.0   BUN mg/dL 33* 33* 40*   CREATININE mg/dL 1.81* 1.73* 1.49*   CALCIUM mg/dL 8.9 9.2 9.1   BILIRUBIN mg/dL 0.4 0.4 0.4   ALK PHOS U/L 69 75 72   ALT (SGPT) U/L 13 15 11   AST (SGOT) U/L 15 16 13   GLUCOSE mg/dL 212* 241* 261*       Culture Data:        Radiology Data:   Imaging Results (Last 24 Hours)     ** No results found for the last 24 hours. **           I have reviewed the patient's current medications.     Assessment/Plan     Active Hospital Problems    Diagnosis   • Morbid obesity with BMI of 40.0-44.9, adult (CMS/[facility])   • WANDER (acute kidney injury) (CMS/[facility])   • Anemia due to chronic kidney disease   • Diabetic foot infection (CMS/[facility])   • Abdominal pain   • Type 2 diabetes mellitus with hyperglycemia, with long-term current use of insulin (CMS/[facility])     hold insulin the am of procedure     • CAD (coronary artery disease)   • Chronic diastolic congestive heart failure (CMS/[facility])   • Diabetes mellitus (CMS/[facility])     1.  WANDER on CKD III  -Cr climbing today, will defer fluids/diuretics to Nephrology    2.  Ostemyelitis of R foot  -IV Clindamycin as per ID    3.  T2DM with hyperglycemia, Neuropathy  -SSI    4.  HTN  -monitor  -Losartan held for WANDER    5.  HLD  -Crestor    6.  GERD  -Protonix    7.  CAD  -ASA  -Statin          Discharge Planning: I expect the patient to be discharged to LTAC in ? days  Willy Rollins MD   11/05/19   10:51 AM

## 2019-11-05 NOTE — PLAN OF CARE
Problem: Patient Care Overview  Goal: Plan of Care Review  Outcome: Ongoing (interventions implemented as appropriate)   11/05/19 2268   Coping/Psychosocial   Plan of Care Reviewed With patient   Plan of Care Review   Progress improving   OTHER   Outcome Summary Pt medicated for c/o generalized pain. Main complaint today has been nausea; MD notified. Zofran given with no results. Phenergan given with adequate results. IV clindamycin given per order. Tolerating regular/renal/ada diet; SS insulin given per order. Creat 1.81, BUN 33. Diabetic ulcer to R foot; painted with betadine; wet to dry with gauze/ace bandage. Tele on; sinus. VSS. Cont to monitor. Awaiting decision on if patient can go to LTACH.     Goal: Individualization and Mutuality  Outcome: Ongoing (interventions implemented as appropriate)    Goal: Discharge Needs Assessment  Outcome: Ongoing (interventions implemented as appropriate)      Problem: Pain, Chronic (Adult)  Goal: Acceptable Pain/Comfort Level and Functional Ability  Outcome: Ongoing (interventions implemented as appropriate)      Problem: Fall Risk (Adult)  Goal: Absence of Fall  Outcome: Ongoing (interventions implemented as appropriate)      Problem: Renal Failure/Kidney Injury, Acute (Adult)  Goal: Signs and Symptoms of Listed Potential Problems Will be Absent, Minimized or Managed (Renal Failure/Kidney Injury, Acute)  Outcome: Ongoing (interventions implemented as appropriate)      Problem: Wound (Includes Pressure Injury) (Adult)  Goal: Signs and Symptoms of Listed Potential Problems Will be Absent, Minimized or Managed (Wound)  Outcome: Ongoing (interventions implemented as appropriate)

## 2019-11-05 NOTE — PROGRESS NOTES
Continued Stay Note   [place]     Patient Name: Erick Luong  MRN: [id number]  Today's Date: 11/5/2019    Admit Date: 10/27/2019    Discharge Plan     Row Name 11/05/19 1115       Plan    Plan  LTAC precert    Patient/Family in Agreement with Plan  yes    Plan Comments  Insurance has not made a decision as of yet. Domenica started precert yesterday.        Discharge Codes    No documentation.             MEJIA Carreno

## 2019-11-05 NOTE — THERAPY TREATMENT NOTE
Acute Care - Physical Therapy Treatment Note  [facility]     Patient Name: Erick Luong  : 1955  MRN: [id number]  Today's Date: 2019  Onset of Illness/Injury or Date of Surgery: 10/27/19          Admit Date: 10/27/2019    Visit Dx:    ICD-10-CM ICD-9-CM   1. WANDER (acute kidney injury) (CMS/[facility]) N17.9 584.9   2. Congestive heart failure, unspecified HF chronicity, unspecified heart failure type (CMS/[facility]) I50.9 428.0   3. Urinary tract infection without hematuria, site unspecified N39.0 599.0   4. Decreased mobility and endurance Z74.09 780.99     Patient Active Problem List   Diagnosis   • Lower abdominal pain   • SIRS (systemic inflammatory response syndrome) (CMS/[facility])   • Fever, unknown origin   • Viral gastroenteritis   • Chronic diastolic congestive heart failure (CMS/[facility])   • CAD (coronary artery disease)   • Diabetes mellitus (CMS/[facility])   • Essential hypertension   • Sleep apnea   • Arthritis   • Mixed hyperlipidemia   • Shortness of breath   • Acute pancreatitis   • Chest pain, non-cardiac   • Obesity, unspecified obesity severity, unspecified obesity type   • HTN (hypertension), benign   • Epigastric pain   • Type 2 diabetes mellitus with hyperglycemia, with long-term current use of insulin (CMS/[facility])   • Pleuritic chest pain   • Slow transit constipation   • Nausea and vomiting   • CKD (chronic kidney disease)   • Nonsmoker   • Orthostatic hypotension   • Abdominal pain   • Constipation   • Ischemic colitis (CMS/[facility])   • Altered mental status   • Diabetic foot infection (CMS/[facility])   • WANDER (acute kidney injury) (CMS/[facility])   • Anemia due to chronic kidney disease   • Morbid obesity with BMI of 40.0-44.9, adult (CMS/[facility])   • Infection due to enterococcus   • Osteomyelitis of fifth toe of right foot (CMS/[facility])   • CHF (congestive heart failure) (CMS/[facility])       Therapy Treatment    Rehabilitation Treatment Summary     Row Name 19 1314 19 0818          Treatment Time/Intention    Discipline   physical therapy assistant  -  physical therapy assistant  -     Document Type  therapy note (daily note)  -  therapy note (daily note)  -     Subjective Information  complains of;pain  -LC2  --     Mode of Treatment  physical therapy  -  physical therapy  -LC     Comment  --  patient states he has not had breakfast yet and requests for therapy to come back later.  -LC2     Reason Treatment Not Performed  --  patient/family declined treatment  -LC2     Recorded by [LC] Alesha Napoles, PTA 11/05/19 1314  [LC2] Alesha Napoles, PTA 11/05/19 1341 [LC] Alesha Napoles, PTA 11/05/19 0819  [LC2] Alesha Napoles, PTA 11/05/19 0820     Row Name 11/05/19 1314             Sit-Stand Transfer    Sit-Stand [place] (Transfers)  supervision 2x5 reps at EOB  -LC      Recorded by [LC] Alesha Napoles, PTA 11/05/19 1347      Row Name 11/05/19 1314             Gait/Stairs Assessment/Training    [place] Level (Gait)  contact guard  -      Distance in Feet (Gait)  75' x4  -LC      Deviations/Abnormal Patterns (Gait)  gait speed decreased;stride length decreased  -LC      Comment (Gait/Stairs)  patient reports he is feeling more unsteady on this date.   -LC      Recorded by [LC] Alesha Napoles, PTA 11/05/19 1347      Row Name 11/05/19 1314             Therapeutic Exercise    Lower Extremity (Therapeutic Exercise)  LAQ (long arc quad), bilateral;marching while seated  -      Lower Extremity Range of Motion (Therapeutic Exercise)  ankle dorsiflexion/plantar flexion, bilateral;knee flexion/extension, bilateral;hip abduction/adduction, bilateral  -      Exercise Type (Therapeutic Exercise)  AROM (active range of motion)  -      Position (Therapeutic Exercise)  seated  -      Sets/Reps (Therapeutic Exercise)  20  -LC      Recorded by [LC] Alesha Napoles, PTA 11/05/19 1347      Row Name 11/05/19 1314             Pain Scale: Numbers Pre/Post-Treatment    Pain Scale: Numbers, Pretreatment  6/10  -LC      Pain Scale: Numbers,  Post-Treatment  6/10  -LC      Pain Location - Side  Right  -LC      Pain Location  foot  -LC      Pain Intervention(s)  Medication (See MAR)  -LC      Recorded by [LC] Alesha Napoles, PTA 11/05/19 1347      Row Name                Wound 07/22/19 2120 Right posterior foot diabetic/neuropathic ulceration    Wound - Properties Group Date first assessed: 07/22/19 [KB] Time first assessed: 2120 [KB] Side: Right [KB] Orientation: posterior [KB] Location: foot [KB] Type: diabetic/neuropathic ulceration [KB] Recorded by:  [KB] Samantha Israel RN 07/23/19 0008      User Key  (r) = Recorded By, (t) = Taken By, (c) = Cosigned By    Initials Name Effective Dates Discipline     Alesha Napoles, PARUL 10/18/19 -  PT    Samantha Edwards RN 12/31/18 -  Nurse          Wound 07/22/19 2120 Right posterior foot diabetic/neuropathic ulceration (Active)   Dressing Appearance dry;intact;no drainage 11/5/2019  8:00 AM   Closure WALKER 11/5/2019  8:00 AM   Base dressing in place, unable to visualize 11/5/2019  8:00 AM   Care, Wound cleansed with;sterile water 11/5/2019 11:45 AM   Dressing Care, Wound dressing changed;gauze;elastic bandage 11/5/2019 11:45 AM           Physical Therapy Education     Title: PT OT SLP Therapies (Done)     Topic: Physical Therapy (Done)     Point: Mobility training (Done)     Learning Progress Summary           Patient Acceptance, E, VU,DU by OLIVERIO at 11/2/2019  3:04 PM    Comment:  benefits of increased amb    Acceptance, E, VU by AISHWARYA at 10/31/2019  2:18 PM    Comment:  Pt. educated on increasing gait speed/stride length to prevent risk of fall    Acceptance, E, VU by AISHWARYA at 10/30/2019 10:17 AM    Comment:  Pt. educated on importance of maintaining AROM DF R foot needed for gait safety    Acceptance, E, VU by AISHWARYA at 10/29/2019 10:55 AM    Comment:  Pt. educated on importance on ambulating with reduced WB on R foot to  decrease pain    Acceptance, E,D, NR,VU by DE at 10/28/2019  9:46 AM    Comment:  pt educated on safety and  proper techinue for functional mobility. pt intructed on proper body mechanics to maximize function and adhere to current safety precautions. pt needs reinforcement with proper use of RW to decrease weight bearing through R foot                   Point: Body mechanics (Done)     Learning Progress Summary           Patient Acceptance, E, VU by AISHWARYA at 10/31/2019  2:18 PM    Comment:  Pt. educated on increasing gait speed/stride length to prevent risk of fall    Acceptance, E, VU by AISHWARYA at 10/30/2019 10:17 AM    Comment:  Pt. educated on importance of maintaining AROM DF R foot needed for gait safety    Acceptance, E, VU by AISHWARYA at 10/29/2019 10:55 AM    Comment:  Pt. educated on importance on ambulating with reduced WB on R foot to  decrease pain    Acceptance, E,D, NR,VU by DE at 10/28/2019  9:46 AM    Comment:  pt educated on safety and proper techinue for functional mobility. pt intructed on proper body mechanics to maximize function and adhere to current safety precautions. pt needs reinforcement with proper use of RW to decrease weight bearing through R foot                   Point: Precautions (Done)     Learning Progress Summary           Patient Acceptance, E, VU by AISHWARYA at 10/31/2019  2:18 PM    Comment:  Pt. educated on increasing gait speed/stride length to prevent risk of fall    Acceptance, E, VU by AISHWARYA at 10/30/2019 10:17 AM    Comment:  Pt. educated on importance of maintaining AROM DF R foot needed for gait safety    Acceptance, E, VU by AISHWARYA at 10/29/2019 10:55 AM    Comment:  Pt. educated on importance on ambulating with reduced WB on R foot to  decrease pain    Acceptance, E,D, NR,VU by DE at 10/28/2019  9:46 AM    Comment:  pt educated on safety and proper techinue for functional mobility. pt intructed on proper body mechanics to maximize function and adhere to current safety precautions. pt needs reinforcement with proper use of RW to decrease weight bearing through R foot                                User Key     Initials Effective Dates Name Provider Type Discipline    AF 02/11/19 -  Faviola Chung PTA Physical Therapy Assistant PT    JW 09/18/19 -  Melly Méndez, PTA Student PTA Student PT    DE 10/10/19 -  Sudarshan Hogue, PT Student PT Student PT                PT Recommendation and Plan     Plan of Care Reviewed With: patient  Progress: improving  Outcome Summary: Patient peforms transfers from chair with supervision and requried assistance with donning of R boot. He ambulted 250'  stand by assist and presents with decreased ivelisse and stride length. He perfomed seated BLE therex in all available planes x20 reps and presents with decreased R dorsiflexio. Patient returned to chair with call light in reach. PT to follow.  Outcome Measures     Row Name 11/02/19 1504             How much help from another person do you currently need...    Turning from your back to your side while in flat bed without using bedrails?  4  -AF      Moving from lying on back to sitting on the side of a flat bed without bedrails?  3  -AF      Moving to and from a bed to a chair (including a wheelchair)?  4  -AF      Standing up from a chair using your arms (e.g., wheelchair, bedside chair)?  4  -AF      Climbing 3-5 steps with a railing?  3  -AF      To walk in hospital room?  4  -AF      AM-PAC 6 Clicks Score (PT)  22  -AF         Functional Assessment    Outcome Measure Options  AM-PAC 6 Clicks Basic Mobility (PT)  -AF        User Key  (r) = Recorded By, (t) = Taken By, (c) = Cosigned By    Initials Name Provider Type    AF Faviola Chung PTA Physical Therapy Assistant         Time Calculation:   PT Charges     Row Name 11/05/19 1347             Time Calculation    Start Time  1314  -      Stop Time  1339  -      Time Calculation (min)  25 min  -      PT Received On  11/05/19  -      PT Goal Re-Cert Due Date  11/07/19  -         Time Calculation- PT    Total Timed Code Minutes- PT  25 minute(s)  -         User Key  (r) = Recorded By, (t) = Taken By, (c) = Cosigned By    Initials Name Provider Type     Alesha Napoles, PTA Physical Therapy Assistant        Therapy Charges for Today     Code Description Service Date Service Provider Modifiers Qty    40708526906 HC PT THER PROC EA 15 MIN 11/4/2019 Alesha Napoles, PTA GP 1    90644954896 HC GAIT TRAINING EA 15 MIN 11/4/2019 Alesha Napoles, PTA GP 1    61357777474 HC PT THER PROC EA 15 MIN 11/5/2019 Alesha Napoles, PTA GP 1    00871177987 HC GAIT TRAINING EA 15 MIN 11/5/2019 Alesha Napoles, PTA GP 1          PT G-Codes  Outcome Measure Options: AM-PAC 6 Clicks Basic Mobility (PT)  AM-PAC 6 Clicks Score (PT): 22  AM-PAC 6 Clicks Score (OT): 20    Alesha Napoles PTA  11/5/2019

## 2019-11-05 NOTE — PLAN OF CARE
Problem: Patient Care Overview  Goal: Plan of Care Review  Outcome: Ongoing (interventions implemented as appropriate)   11/05/19 0247   Coping/Psychosocial   Plan of Care Reviewed With patient   Plan of Care Review   Progress improving   OTHER   Outcome Summary Medicated for pain and nausea x2. Blood glucose remains elevated, insulins given per order. IV abx per order. No BM this shift. Kidney function tests trending down, creatinine- 1.73, BUN- 3.3. Up with minimal assist. VSS, will cont to monitor.      Goal: Discharge Needs Assessment  Outcome: Ongoing (interventions implemented as appropriate)   10/27/19 2024 10/29/19 1509 11/04/19 0508   Discharge Needs Assessment   Readmission Within the Last 30 Days --  --  no previous admission in last 30 days   Concerns to be Addressed --  adjustment to diagnosis/illness;care coordination/care conferences;discharge planning --    Patient/Family Anticipates Transition to --  home with help/services;home with family --    Patient/Family Anticipated Services at Transition --  home health care --    Transportation Anticipated --  family or friend will provide --    Outpatient/Agency/Support Group Needs --  skilled nursing facility --    Discharge Facility/Level of Care Needs --  nursing facility, skilled --    Discharge Coordination/Progress --  Pt lives at home with his spouse. He currently gets iv abx through [facility] and is followed by Suze [facility]CHES. He plans to return home at d/c with these services. Will follow. --    Disability   Equipment Currently Used at Home orthotic device --  --        Problem: Pain, Chronic (Adult)  Goal: Acceptable Pain/Comfort Level and Functional Ability  Outcome: Ongoing (interventions implemented as appropriate)   11/05/19 0247   Pain, Chronic (Adult)   Acceptable Pain/Comfort Level and Functional Ability making progress toward outcome       Problem: Fall Risk (Adult)  Goal: Absence of Fall  Outcome: Ongoing (interventions implemented as  appropriate)   11/05/19 0247   Fall Risk (Adult)   Absence of Fall making progress toward outcome       Problem: Renal Failure/Kidney Injury, Acute (Adult)  Goal: Signs and Symptoms of Listed Potential Problems Will be Absent, Minimized or Managed (Renal Failure/Kidney Injury, Acute)  Outcome: Ongoing (interventions implemented as appropriate)   11/05/19 0247   Goal/Outcome Evaluation   Problems Assessed (Acute Renal Failure/Kidney Injury) all   Problems Present (ARF/Kidney Injury) situational response       Problem: Wound (Includes Pressure Injury) (Adult)  Goal: Signs and Symptoms of Listed Potential Problems Will be Absent, Minimized or Managed (Wound)  Outcome: Ongoing (interventions implemented as appropriate)   11/05/19 0247   Goal/Outcome Evaluation   Problems Assessed (Wound) all   Problems Present (Wound) delayed wound healing;pain;infection

## 2019-11-05 NOTE — PLAN OF CARE
Problem: Patient Care Overview  Goal: Plan of Care Review  Outcome: Ongoing (interventions implemented as appropriate)   11/05/19 1314   Coping/Psychosocial   Plan of Care Reviewed With patient   Plan of Care Review   Progress improving   OTHER   Outcome Summary Patient continue to report R foot pain with mobility, today 6/10. He ambulated 75'x4 trials CGA due to reports of feelilng unsteady. No LOB noted during ambulation, decreased gait speed and stride length decreased. He performed seated BLE therex at EOB x20 reps in all available planes as well as sit to stands 2x5 at EOB CGA with min cues for technique and safety. Patient will benefit from continued therapy.

## 2019-11-05 NOTE — PROGRESS NOTES
[facility] - PODIATRY    Today's Date: 11/05/19    Patient Name: Erick Luong  MRN: [id number]  CSN: [id number]  PCP: Del Shetty MD  Referring Provider: Abebe Garzon DO  Attending Provider: Willy Rollins MD  Length of Stay: 9    SUBJECTIVE   Chief Complaint: Right foot wound and infection    HPI: Erick Luong, a 64 y.o.male. No overnight events. Has only minor discomfort to right foot. Continue IV abx per ID. Should have ~2 weeks remaining on planned course of treatment. Plans for possible LTACH placement.      Past Medical History:   Diagnosis Date   • Arthritis    • CHF (congestive heart failure) (CMS/HCC)    • Coronary artery disease    • Diabetes mellitus (CMS/HCC)    • Hyperlipidemia    • Hypertension    • Myocardial infarction (CMS/HCC)    • Pancreatitis    • Renal disorder    • Sleep apnea with use of continuous positive airway pressure (CPAP)      Past Surgical History:   Procedure Laterality Date   • ABDOMINAL SURGERY     • APPENDECTOMY     • BACK SURGERY     • CARDIAC SURGERY     • CATARACT EXTRACTION     • CERVICAL SPINE SURGERY     • COLONOSCOPY N/A 1/31/2017    Normal exam repeat in 5 years   • COLONOSCOPY N/A 2/11/2019    Procedure: COLONOSCOPY WITH ANESTHESIA;  Surgeon: Tyrell Smith MD;  Location: [facility] ENDOSCOPY;  Service: Gastroenterology   • COLONOSCOPY W/ POLYPECTOMY  03/04/2014    Hyperplastic polyp   • CORONARY ARTERY BYPASS GRAFT  10/2015   • ENDOSCOPY  04/13/2011    Gastritis with hemorrhage   • ENDOSCOPY N/A 5/5/2017    Normal exam   • ENDOSCOPY N/A 2/11/2019    Procedure: ESOPHAGOGASTRODUODENOSCOPY WITH ANESTHESIA;  Surgeon: Tyrell Smith MD;  Location: [facility] ENDOSCOPY;  Service: Gastroenterology   • INCISION AND DRAINAGE OF WOUND Left 09/2007    spider bite     Family History   Problem Relation Age of Onset   • Colon cancer Father    • Heart disease Father    • Colon cancer Sister    • Colon polyps Sister    • Alzheimer's disease Mother  "   • Coronary artery disease Sister    • Coronary artery disease Sister      Social History     Socioeconomic History   • Marital status:      Spouse name: Not on file   • Number of children: Not on file   • Years of education: Not on file   • Highest education level: Not on file   Tobacco Use   • Smoking status: Never Smoker   • Smokeless tobacco: Never Used   • Tobacco comment: smoked in highschool   Substance and Sexual Activity   • Alcohol use: No   • Drug use: No   • Sexual activity: Defer     Allergies   Allergen Reactions   • Bactrim [Sulfamethoxazole-Trimethoprim] Other (See Comments)     \"RENAL FAILURE\"   • Vancomycin Itching     Current Facility-Administered Medications   Medication Dose Route Frequency Provider Last Rate Last Dose   • acetaminophen (TYLENOL) tablet 650 mg  650 mg Oral Q4H PRN Abebe Garzon DO        Or   • acetaminophen (TYLENOL) 160 MG/5ML solution 650 mg  650 mg Oral Q4H PRN Abebe Garzon DO        Or   • acetaminophen (TYLENOL) suppository 650 mg  650 mg Rectal Q4H PRN Abebe Garzon DO       • aspirin EC tablet 81 mg  81 mg Oral Daily Abebe Garzon DO   81 mg at 11/05/19 0935   • bisacodyl (DULCOLAX) suppository 10 mg  10 mg Rectal Daily PRN Raquel Duncan, APRN   10 mg at 11/01/19 0101   • [START ON 11/6/2019] bumetanide (BUMEX) tablet 0.5 mg  0.5 mg Oral Daily Stewart Alvarez, APRN       • clindamycin (CLEOCIN) 900 mg in dextrose 5% 50 mL IVPB (premix)  900 mg Intravenous Q8H Julia Adrian MD   Stopped at 11/05/19 1220   • dextrose (D50W) 25 g/ 50mL Intravenous Solution 25 g  25 g Intravenous Q15 Min PRN Telly Rodriguez MD       • dextrose (GLUTOSE) oral gel 15 g  15 g Oral Q15 Min PRN Telly Rodriguez MD       • diphenhydrAMINE (BENADRYL) injection 25 mg  25 mg Intravenous Q6H PRN Raquel Duncan, APRN   25 mg at 10/29/19 2336   • DULoxetine (CYMBALTA) DR capsule 60 mg  60 mg Oral Daily Abebe Garzon DO   60 mg at 11/05/19 0935 "   • enoxaparin (LOVENOX) syringe 30 mg  30 mg Subcutaneous Q24H Telly Rodriguez MD   30 mg at 11/04/19 2036   • gabapentin (NEURONTIN) capsule 100 mg  100 mg Oral BID Abebe Garzon DO   100 mg at 11/05/19 0939   • glucagon (human recombinant) (GLUCAGEN DIAGNOSTIC) injection 1 mg  1 mg Subcutaneous Q15 Min PRN Telly Rodriguez MD       • Influenza Vac Subunit Quad (FLUCELVAX) injection 0.5 mL  0.5 mL Intramuscular During Hospitalization Abebe Garzon DO       • insulin detemir (LEVEMIR) injection 20 Units  20 Units Subcutaneous Q12H Telly Rodriguez MD   20 Units at 11/05/19 0834   • insulin lispro (humaLOG) injection 0-24 Units  0-24 Units Subcutaneous 4x Daily With Meals & Nightly Telly Rodriguez MD   16 Units at 11/05/19 1235   • ipratropium-albuterol (DUO-NEB) nebulizer solution 3 mL  3 mL Nebulization Q6H PRN Abebe Garzon DO       • lactulose solution 20 g  20 g Oral BID Abebe Garzon DO   20 g at 11/05/19 0936   • latanoprost (XALATAN) 0.005 % ophthalmic solution 1 drop  1 drop Both Eyes Nightly Telly Rodriguez MD   1 drop at 11/04/19 2036   • magnesium hydroxide (MILK OF MAGNESIA) suspension 2400 mg/10mL 10 mL  10 mL Oral Daily PRN Raquel Duncan APRN   10 mL at 11/04/19 0914   • methylnaltrexone (RELISTOR) injection 8 mg  8 mg Subcutaneous Every Other Day Abebe Garzon DO   8 mg at 11/04/19 0945   • nystatin (MYCOSTATIN) powder   Topical Q12H Raquel Duncan APRN       • ondansetron (ZOFRAN) injection 4 mg  4 mg Intravenous Q4H PRN Willy Rollins MD       • oxyCODONE-acetaminophen (PERCOCET) 7.5-325 MG per tablet 1 tablet  1 tablet Oral Q4H PRN Telly Rodriguez MD   1 tablet at 11/05/19 0834   • pantoprazole (PROTONIX) EC tablet 40 mg  40 mg Oral Nightly Telly Rodriguez MD   40 mg at 11/04/19 2036   • polyethylene glycol 3350 powder (packet)  17 g Oral Daily Telly Rodriguez MD   17 g at 11/05/19 0936   • rosuvastatin (CRESTOR) tablet 40 mg  40 mg Oral Daily Duran  Abebe Acevedo DO   40 mg at 11/05/19 0934   • saccharomyces boulardii (FLORASTOR) capsule 250 mg  250 mg Oral BID Abebe Garzon DO   250 mg at 11/05/19 0936   • sodium chloride 0.9 % flush 10 mL  10 mL Intravenous PRN Reuben Tadeo MD       • sodium chloride 0.9 % flush 10 mL  10 mL Intravenous Q12H Abebe Garzon DO   10 mL at 11/05/19 0835   • sodium chloride 0.9 % flush 10 mL  10 mL Intravenous PRN Abebe Garzon DO       • traZODone (DESYREL) tablet 25 mg  25 mg Oral Nightly PRN Abebe Garzon DO         Review of Systems   Constitutional: Negative for chills and fever.   HENT: Negative for congestion.    Respiratory: Positive for shortness of breath.    Cardiovascular: Positive for leg swelling. Negative for chest pain.   Gastrointestinal: Negative for constipation, diarrhea, nausea and vomiting.   Musculoskeletal: Positive for arthralgias, back pain and gait problem.   Skin: Positive for wound.   Neurological: Positive for weakness and numbness.       OBJECTIVE     Vitals:    11/05/19 1013   BP: 130/61   Pulse: 84   Resp: 14   Temp: 97.7 °F (36.5 °C)   SpO2: 98%       PHYSICAL EXAM  GEN:   A&Ox3, NAD. Pt presents in hospital bed. Accompanied by PT.     Foot/Ankle Exam:       General:   Orientation: AAOx3    Affect: appropriate       Right Foot/Ankle:      Vascular   Dorsalis pedis:  2+  Posterior tibial:  2+  Skin Temperature: warm    Edema Grading:  Pitting and +2  CFT:  3     Neurologic   Light touch sensation:  Diminished  Vibratory sensation:  Diminished  Hot/Cold sensation: diminished    Protective Sensation using Smith-Mir Monofilament:  Diminished     Musculoskeletal   Ecchymosis:  None  Tenderness comment:  Mildly to plantar ulceration     Muscle Strength   Normal strength    Foot dorsiflexion:  5  Foot plantar flexion:  5  Foot inversion:  5  Foot eversion:  5     Range of Motion   Foot and ankle ROM within normal limits       Dermatologic   Skin: drainage, erythema  and ulcer    Skin: no right foot cellulitis        Additional Comments Large ulceration noted plantar fifth metatarsal head right foot.  Mild surrounding callus formation. No purulence or malodor. Continues probe to bone centrally but bone is hard and non-fragmented. Wound roughly measures 2.5 cm x 2.5 cm x 0.6 cm.     Left Foot/Ankle:      Vascular   Dorsalis pedis:  2+  Posterior tibial:  2+  Skin Temperature: warm    Edema Grading:  +2 and pitting  CFT:  3     Neurologic   Light touch sensation:  Diminished  Vibratory sensation:  Diminished  Hot/cold sensation: diminished    Protective Sensation using [place]-Mir Monofilament:  Diminished     Musculoskeletal   Ecchymosis:  None  Tenderness: none       Muscle Strength   Normal strength    Foot dorsiflexion:  5  Foot plantar flexion:  5  Foot inversion:  5  Foot eversion:  5     Range of Motion   Foot and ankle ROM within normal limits       Dermatologic   Skin: skin intact       Image:       RADIOLOGY/NUCLEAR:  Nm Lung Ventilation Perfusion    Result Date: 10/30/2019  Narrative: EXAMINATION: NM LUNG VENTILATION PERFUSION- 10/30/2019 11:02 AM CDT  HISTORY: Acute onset chest pain  Comparison: Chest x-ray 10/29/2019, nuclear medicine VQ scan 08/29/2016  Radiopharmaceutical: 9.5 mCi of Xenon-133 for the ventilation study and 5.46 mCi Tc 99m MAA for the perfusion study.  Technique: Following inhalation of the aerosolized radiopharmaceutical, the ventilation study was performed with images of the thorax being obtained in posterior projection. Thereafter, the perfusion study was performed following the injection of radiolabeled MAA particles with images of the thorax obtained in 8 projections.  FINDINGS:  No pleural based, wedge-shape, matched or mismatched segmental ventilation perfusion defects are demonstrated.       Impression: Findings are most commonly associated with low probability for acute pulmonary embolism.  This report was finalized on 10/30/2019 11:02  by Dr. Justen Figueroa MD.    Ct Abdomen Pelvis With Contrast    Result Date: 10/27/2019  Narrative: CT ABDOMEN PELVIS W CONTRAST- 10/27/2019 6:32 PM CDT  HISTORY: Abdominal pain, unspecified   COMPARISON: 10/12/2019.  DOSE LENGTH PRODUCT: 1919 mGy cm. Automated exposure control was also utilized to decrease patient radiation dose.  TECHNIQUE: Following the intravenous administration of contrast, helical CT tomographic images of the abdomen and pelvis were acquired. Multiplanar reformatted images were provided for review.  FINDINGS: LOWER CHEST: Findings in the chest will be described in a separate dictation.  LIVER: No focal liver lesion. The hepatic vasculature is patent.  BILIARY SYSTEM: The gallbladder has been removed. There is no evidence of biliary ductal dilation.  PANCREAS: There is mild atrophy in the pancreas.  SPLEEN: Unremarkable.  KIDNEYS: Simple cysts are seen in the RIGHT kidney. There has been no significant interval change. There is no hydronephrosis. The LEFT kidney is unremarkable. The ureters are decompressed and normal in appearance.  ADRENALS: Unremarkable.  RETROPERITONEUM: No mass, lymphadenopathy or hemorrhage.  GI TRACT: No evidence of obstruction or bowel wall thickening. The appendix has been removed.  OTHER: There is no mesenteric mass, lymphadenopathy or fluid collection. The abdominopelvic vasculature is patent. The osseous structures and soft tissues demonstrate no worrisome lesions. Mild subcutaneous edema is again noted in the lower abdominal wall.  PELVIS: No mass lesion, fluid collection or significant lymphadenopathy is seen in the pelvis. The urinary bladder is normal in appearance.      Impression: 1. No evidence of acute abdominopelvic process. 2. Subcutaneous edema in the anterior abdomen and in the dependent portions of the hips and buttocks. Findings could be due to volume overload.   This report was finalized on 10/27/2019 19:05 by Dr. Sudarshan Starr MD.    Ct Abdomen  Pelvis With Contrast    Result Date: 10/12/2019  Narrative: EXAMINATION:  CT ABDOMEN PELVIS W CONTRAST-  10/12/2019 4:45 AM CDT  HISTORY: Abdominal pain. Elevated white blood cell count of 11,770.  TECHNIQUE: Spiral CT was performed of the abdomen and pelvis with contrast. Multiplanar images were reconstructed.  DLP: 980 mGy-cm. Automated dosage control was utilized.  COMPARISON: 03/04/2019.  LUNG BASES: There are small bilateral pleural effusions. A small subpleural nodule in the right lower lobe laterally on image 1 of series 3 is clearly calcified on the previous study on 02/07/2019. There is mild dependent atelectasis. Coronary artery calcification is noted.  LIVER AND SPLEEN: Prior cholecystectomy. The liver and spleen are unremarkable.  PANCREAS: There is fatty atrophy of the pancreas.  KIDNEYS AND ADRENALS: The adrenal glands demonstrate no abnormality. There are 2 lower pole renal cyst on the right with the larger measuring 3.6 cm. The ureters are nondilated. The urinary bladder is unremarkable.  BOWEL: There is underdistention of the stomach. Small bowel loops are nondilated. Moderate stool in the colon. Prior history of appendectomy.  OTHER: Small retroperitoneal lymph nodes are likely reactive. There is mild atheromatous disease of the aortoiliac vessels. There are degenerative changes of the spine.      Impression: 1. Small bilateral pleural effusions with adjacent atelectasis. A small nodule in the right lower lobe is clearly calcified on the previous study. 2. Atheromatous disease of the aortoiliac vessels and coronary arteries. 3. Prior cholecystectomy. 4. Renal cysts on the right. 5. No acute appearing bowel abnormality. 6. Other nonacute findings, as discussed above. This report was finalized on 10/12/2019 07:42 by Dr. Raz Mendes MD.    Xr Chest 1 View    Result Date: 10/30/2019  Narrative: Exam: XR CHEST 1 VW-  Indication: Shortness of breath  Comparison: 10/27/2019  Findings:  The cardiac  silhouette is within normal limits. Left-sided PICC tip overlies the cavoatrial junction. No pleural effusion, pneumothorax, or focal consolidation. Median sternotomy wires. Cervical spine hardware is noted.      Impression:  No acute findings. This report was finalized on 10/30/2019 06:56 by Dr. Santana Figueredo MD.    Xr Chest 1 View    Result Date: 10/27/2019  Narrative: Exam: XR CHEST 1 VW-  Indication: Chest Pain Triage Protocol  Comparison: 10/12/2019  Findings:  Cardiac silhouette is stable. Postoperative change of CABG. Chronic mild right hemidiaphragm elevation. No definite consolidation, large pleural effusion, or visible pneumothorax. Left upper extremity PICC is stable in position. No acute osseous findings.      Impression:  No acute findings. This report was finalized on 10/27/2019 16:41 by Dr. Santana Figueredo MD.    Xr Chest 1 View    Result Date: 10/12/2019  Narrative: EXAMINATION:  XR CHEST 1 VW-  10/12/2019 4:10 AM CDT  HISTORY: Allergic reaction. Coronary artery disease. Prior heart bypass surgery. Diabetes. Hypertension.  COMPARISON: 10/08/2019.  FINDINGS:  There is hypoventilation with vascular crowding. There is no dense infiltrate. There is bronchial wall thickening. There is borderline cardiomegaly. There is a PICC line catheter on the left. There has been prior heart bypass surgery and cervical fusion.      Impression: 1. Hypoventilation with vascular crowding. 2. Stable bronchial wall thickening. No dense infiltrate or effusion.   This report was finalized on 10/12/2019 07:43 by Dr. Raz Mendes MD.    Xr Chest 1 View    Result Date: 10/8/2019  Narrative: Exam: XR CHEST 1 VW-  Indication: PICC placement  Comparison: 02/07/2019  Findings:  Cardiac silhouette is normal. Postoperative change of CABG. Left-sided PICC tip overlies the cavoatrial junction. No consolidation, pleural effusion, or pneumothorax. No suspicious osseous findings.      Impression:  Left upper extremity PICC tip overlies  the cavoatrial junction. This report was finalized on 10/08/2019 12:36 by Dr. Santana Figueredo MD.    Us Venous Doppler Lower Extremity Right (duplex)    Result Date: 10/30/2019  Narrative: History: Right lower extremity swelling      Impression: Impression: There is no evidence of deep venous thrombosis or superficial thrombophlebitis of the right lower extremity.  Comments: Right lower extremity venous duplex exam was performed using color Doppler flow, Doppler wave form analysis, and grayscale imaging, with and without compression. There is no evidence of deep venous thrombosis of the common femoral, superficial femoral, popliteal, posterior tibial, and peroneal veins. There is no thrombus identified in the saphenofemoral junction or the greater saphenous vein.  This report was finalized on 10/30/2019 16:11 by Dr. Talon Santacruz MD.    Ct Angiogram Chest    Result Date: 10/27/2019  Narrative: CT ANGIOGRAM CHEST- 10/27/2019 6:32 PM CDT  HISTORY: Chest pain, acute, PE suspected, intermed prob, positive D-dimer  COMPARISON: 01/18/2018.  DOSE LENGTH PRODUCT: 525 mGy cm. Automated exposure control was also utilized to decrease patient radiation dose.  TECHNIQUE: Helical tomographic images of the chest were obtained after the administration of intravenous contrast following angiogram protocol. Additionally, 3D and multiplanar reformatted images were provided.    FINDINGS:  Pulmonary arteries: There is adequate enhancement of the pulmonary arteries to evaluate for central and segmental pulmonary emboli. There are no filling defects within the main, lobar, segmental or visualized subsegmental pulmonary arteries. The pulmonary arteries are within normal limits for size.  Aorta and great vessels: The aorta is well opacified and demonstrates no dissection or aneurysm. The great vessels are normal in appearance. Changes of previous CABG are noted.  Visualized neck base: The imaged portion of the base of the neck appears  unremarkable.  Lungs: Dependent changes are seen in both lung bases. There is no pulmonary edema or focal consolidation. No worrisome nodules are identified. Small pleural effusions are present bilaterally. The trachea and bronchial tree are patent.  Heart: Mild dilation of the heart is again noted. There is no pericardial effusion.  Mediastinum and lymph nodes: No enlarged mediastinal, hilar, or axillary lymph nodes are present.  Skeletal and soft tissues: The osseous structures of the thorax and surrounding soft tissues demonstrate no acute process.  Upper abdomen: The imaged portion of the upper abdomen demonstrates no acute process.      Impression: 1. Small pleural effusions may be due to volume overload. 2. No evidence of pulmonary embolus or pneumonia.   This report was finalized on 10/27/2019 19:08 by Dr. Sudarshan Starr MD.    Xr Abdomen Kub    Result Date: 10/31/2019  Narrative: EXAMINATION: XR ABDOMEN KUB-  10/31/2019 9:11 PM CDT  HISTORY: severe abdominal pain; N17.9-Acute kidney failure, unspecified; I50.9-Heart failure, unspecified; N39.0-Urinary tract infection, site not specified; Z74.09-Other reduced mobility  1 view abdomen compared with 01/25/2019.  Nonspecific bowel gas pattern with no sign of obstruction.  No abnormal calcification or unexplained foreign body is seen.  Mild degenerative lumbar spine changes.  Summary: 1. Nonspecific bowel gas pattern. 2. Moderate fecal material is noted within the colon. This report was finalized on 10/31/2019 21:25 by Dr. Gomez Mcgill MD.      LABORATORY/CULTURE RESULTS:  Results from last 7 days   Lab Units 11/05/19 0559 11/04/19 0713 11/03/19  0611   WBC 10*3/mm3 7.36 6.91 7.78   HEMOGLOBIN g/dL 11.2* 11.3* 11.0*   HEMATOCRIT % 34.2* 34.1* 33.3*   PLATELETS 10*3/mm3 263 299 289     Results from last 7 days   Lab Units 11/05/19  0559 11/04/19  0713 11/03/19  0611   SODIUM mmol/L 136 136 136   POTASSIUM mmol/L 5.0 5.1 5.1   CHLORIDE mmol/L 96* 96* 98   CO2  mmol/L 32.0* 31.0* 27.0   BUN mg/dL 33* 33* 40*   CREATININE mg/dL 1.81* 1.73* 1.49*   CALCIUM mg/dL 8.9 9.2 9.1   BILIRUBIN mg/dL 0.4 0.4 0.4   ALK PHOS U/L 69 75 72   ALT (SGPT) U/L 13 15 11   AST (SGOT) U/L 15 16 13   GLUCOSE mg/dL 212* 241* 261*         Microbiology Results (last 10 days)     Procedure Component Value - Date/Time    Blood Culture - Blood, Hand, Right [[id number]] Collected:  10/27/19 1815    Lab Status:  Final result Specimen:  Blood from Hand, Right Updated:  11/01/19 1845     Blood Culture No growth at 5 days    Blood Culture - Blood, Hand, Right [[id number]] Collected:  10/27/19 1655    Lab Status:  Final result Specimen:  Blood from Hand, Right Updated:  11/01/19 1715     Blood Culture No growth at 5 days          PATHOLOGY RESULTS:       ASSESSMENT/PLAN   1.  Diabetic foot ulceration with osteomyelitis, right foot  2.  Diabetes with neuropathy  3.  Peripheral Edema    No debridement performed.    Orders for betadine wet to dry to wound daily.   Limit WB when possible.     Patient started IV abx therapy on October 8 and had intended on 6 weeks of therapy. If having reaction to Vancomycin, ok with d/c and change to abx with good bone penetration. ID onboard for assistance.     Discussed again potential for surgical offloading/bone resection to assist with wound healing. He says he has been considering this option but isn't agreeable just yet. He will think about it and discuss with his wife.     Will continue to follow peripherally. Upon discharge [facility] and restart of HBO therapy.      This document has been electronically signed by Asad Cerrato DPM on November 5, 2019 2:31 PM

## 2019-11-06 ENCOUNTER — HOSPITAL ENCOUNTER (OUTPATIENT)
Facility: HOSPITAL | Age: 64
Discharge: HOME | End: 2019-11-19
Attending: INTERNAL MEDICINE | Admitting: INTERNAL MEDICINE

## 2019-11-06 ENCOUNTER — TELEPHONE (OUTPATIENT)
Dept: PODIATRY | Facility: CLINIC | Age: 64
End: 2019-11-06

## 2019-11-06 VITALS
TEMPERATURE: 98.6 F | HEIGHT: 72 IN | WEIGHT: 315 LBS | BODY MASS INDEX: 42.66 KG/M2 | DIASTOLIC BLOOD PRESSURE: 75 MMHG | SYSTOLIC BLOOD PRESSURE: 137 MMHG | OXYGEN SATURATION: 98 % | RESPIRATION RATE: 18 BRPM | HEART RATE: 77 BPM

## 2019-11-06 LAB
ALBUMIN SERPL-MCNC: 4 G/DL (ref 3.5–5.2)
ALBUMIN/GLOB SERPL: 1.3 G/DL
ALP SERPL-CCNC: 71 U/L (ref 39–117)
ALT SERPL W P-5'-P-CCNC: 13 U/L (ref 1–41)
ANION GAP SERPL CALCULATED.3IONS-SCNC: 11 MMOL/L (ref 5–15)
AST SERPL-CCNC: 13 U/L (ref 1–40)
BASOPHILS # BLD AUTO: 0.05 10*3/MM3 (ref 0–0.2)
BASOPHILS NFR BLD AUTO: 0.7 % (ref 0–1.5)
BILIRUB SERPL-MCNC: 0.4 MG/DL (ref 0.2–1.2)
BUN BLD-MCNC: 30 MG/DL (ref 8–23)
BUN/CREAT SERPL: 18 (ref 7–25)
CALCIUM SPEC-SCNC: 9 MG/DL (ref 8.6–10.5)
CHLORIDE SERPL-SCNC: 94 MMOL/L (ref 98–107)
CO2 SERPL-SCNC: 32 MMOL/L (ref 22–29)
CREAT BLD-MCNC: 1.67 MG/DL (ref 0.76–1.27)
DEPRECATED RDW RBC AUTO: 42.9 FL (ref 37–54)
EOSINOPHIL # BLD AUTO: 0.7 10*3/MM3 (ref 0–0.4)
EOSINOPHIL NFR BLD AUTO: 9.3 % (ref 0.3–6.2)
ERYTHROCYTE [DISTWIDTH] IN BLOOD BY AUTOMATED COUNT: 13.6 % (ref 12.3–15.4)
GFR SERPL CREATININE-BSD FRML MDRD: 42 ML/MIN/1.73
GLOBULIN UR ELPH-MCNC: 3 GM/DL
GLUCOSE BLD-MCNC: 213 MG/DL (ref 65–99)
GLUCOSE BLDC GLUCOMTR-MCNC: 238 MG/DL (ref 70–130)
GLUCOSE BLDC GLUCOMTR-MCNC: 302 MG/DL (ref 70–130)
GLUCOSE BLDC GLUCOMTR-MCNC: 428 MG/DL (ref 70–130)
GLUCOSE BLDC GLUCOMTR-MCNC: 469 MG/DL (ref 70–130)
GLUCOSE BLDC GLUCOMTR-MCNC: 489 MG/DL (ref 70–130)
GLUCOSE BLDC GLUCOMTR-MCNC: 531 MG/DL (ref 70–130)
HCT VFR BLD AUTO: 35.4 % (ref 37.5–51)
HGB BLD-MCNC: 11.6 G/DL (ref 13–17.7)
IMM GRANULOCYTES # BLD AUTO: 0.04 10*3/MM3 (ref 0–0.05)
IMM GRANULOCYTES NFR BLD AUTO: 0.5 % (ref 0–0.5)
LYMPHOCYTES # BLD AUTO: 1.26 10*3/MM3 (ref 0.7–3.1)
LYMPHOCYTES NFR BLD AUTO: 16.8 % (ref 19.6–45.3)
MCH RBC QN AUTO: 28.3 PG (ref 26.6–33)
MCHC RBC AUTO-ENTMCNC: 32.8 G/DL (ref 31.5–35.7)
MCV RBC AUTO: 86.3 FL (ref 79–97)
MONOCYTES # BLD AUTO: 0.83 10*3/MM3 (ref 0.1–0.9)
MONOCYTES NFR BLD AUTO: 11 % (ref 5–12)
NEUTROPHILS # BLD AUTO: 4.64 10*3/MM3 (ref 1.7–7)
NEUTROPHILS NFR BLD AUTO: 61.7 % (ref 42.7–76)
NRBC BLD AUTO-RTO: 0 /100 WBC (ref 0–0.2)
PLATELET # BLD AUTO: 260 10*3/MM3 (ref 140–450)
PMV BLD AUTO: 10.9 FL (ref 6–12)
POTASSIUM BLD-SCNC: 5.4 MMOL/L (ref 3.5–5.2)
PROT SERPL-MCNC: 7 G/DL (ref 6–8.5)
RBC # BLD AUTO: 4.1 10*6/MM3 (ref 4.14–5.8)
SODIUM BLD-SCNC: 137 MMOL/L (ref 136–145)
WBC NRBC COR # BLD: 7.52 10*3/MM3 (ref 3.4–10.8)

## 2019-11-06 PROCEDURE — 63710000001 INSULIN DETEMIR PER 5 UNITS: Performed by: FAMILY MEDICINE

## 2019-11-06 PROCEDURE — 63710000001 INSULIN LISPRO (HUMAN) PER 5 UNITS: Performed by: FAMILY MEDICINE

## 2019-11-06 PROCEDURE — 25010000002 INFLUENZA VAC SUBUNIT QUAD 0.5 ML SUSPENSION PREFILLED SYRINGE: Performed by: INTERNAL MEDICINE

## 2019-11-06 PROCEDURE — 25010000002 METHYLNALTREXONE 12 MG/0.6ML SOLUTION: Performed by: INTERNAL MEDICINE

## 2019-11-06 PROCEDURE — 97116 GAIT TRAINING THERAPY: CPT

## 2019-11-06 PROCEDURE — 25010000002 ENOXAPARIN PER 10 MG: Performed by: INTERNAL MEDICINE

## 2019-11-06 PROCEDURE — 63710000001 INSULIN DETEMIR PER 5 UNITS: Performed by: INTERNAL MEDICINE

## 2019-11-06 PROCEDURE — 63710000001 INSULIN LISPRO (HUMAN) PER 5 UNITS: Performed by: INTERNAL MEDICINE

## 2019-11-06 PROCEDURE — 80053 COMPREHEN METABOLIC PANEL: CPT | Performed by: FAMILY MEDICINE

## 2019-11-06 PROCEDURE — 82962 GLUCOSE BLOOD TEST: CPT

## 2019-11-06 PROCEDURE — G0008 ADMIN INFLUENZA VIRUS VAC: HCPCS | Performed by: INTERNAL MEDICINE

## 2019-11-06 PROCEDURE — 90674 CCIIV4 VAC NO PRSV 0.5 ML IM: CPT | Performed by: INTERNAL MEDICINE

## 2019-11-06 PROCEDURE — 85025 COMPLETE CBC W/AUTO DIFF WBC: CPT | Performed by: FAMILY MEDICINE

## 2019-11-06 RX ORDER — ONDANSETRON 2 MG/ML
4 INJECTION INTRAMUSCULAR; INTRAVENOUS EVERY 4 HOURS PRN
Status: DISCONTINUED | OUTPATIENT
Start: 2019-11-06 | End: 2019-11-19 | Stop reason: HOSPADM

## 2019-11-06 RX ORDER — BISACODYL 10 MG
10 SUPPOSITORY, RECTAL RECTAL DAILY PRN
Status: DISCONTINUED | OUTPATIENT
Start: 2019-11-06 | End: 2019-11-19 | Stop reason: HOSPADM

## 2019-11-06 RX ORDER — LACTULOSE 20 G/30ML
20 SOLUTION ORAL 2 TIMES DAILY
Status: DISCONTINUED | OUTPATIENT
Start: 2019-11-06 | End: 2019-11-19 | Stop reason: HOSPADM

## 2019-11-06 RX ORDER — DEXTROSE MONOHYDRATE 25 G/50ML
25 INJECTION, SOLUTION INTRAVENOUS
Status: DISCONTINUED | OUTPATIENT
Start: 2019-11-06 | End: 2019-11-19 | Stop reason: HOSPADM

## 2019-11-06 RX ORDER — ACETAMINOPHEN 325 MG/1
650 TABLET ORAL EVERY 4 HOURS PRN
Status: DISCONTINUED | OUTPATIENT
Start: 2019-11-06 | End: 2019-11-19 | Stop reason: HOSPADM

## 2019-11-06 RX ORDER — SODIUM CHLORIDE 0.9 % (FLUSH) 0.9 %
10 SYRINGE (ML) INJECTION AS NEEDED
Status: DISCONTINUED | OUTPATIENT
Start: 2019-11-06 | End: 2019-11-19 | Stop reason: HOSPADM

## 2019-11-06 RX ORDER — GABAPENTIN 100 MG/1
100 CAPSULE ORAL 2 TIMES DAILY
Status: DISCONTINUED | OUTPATIENT
Start: 2019-11-06 | End: 2019-11-19 | Stop reason: HOSPADM

## 2019-11-06 RX ORDER — SACCHAROMYCES BOULARDII 250 MG
250 CAPSULE ORAL 2 TIMES DAILY
Status: DISCONTINUED | OUTPATIENT
Start: 2019-11-06 | End: 2019-11-19 | Stop reason: HOSPADM

## 2019-11-06 RX ORDER — IPRATROPIUM BROMIDE AND ALBUTEROL SULFATE 2.5; .5 MG/3ML; MG/3ML
3 SOLUTION RESPIRATORY (INHALATION) EVERY 6 HOURS PRN
Status: DISCONTINUED | OUTPATIENT
Start: 2019-11-06 | End: 2019-11-19 | Stop reason: HOSPADM

## 2019-11-06 RX ORDER — PANTOPRAZOLE SODIUM 40 MG/1
40 TABLET, DELAYED RELEASE ORAL NIGHTLY
Status: DISCONTINUED | OUTPATIENT
Start: 2019-11-06 | End: 2019-11-19 | Stop reason: HOSPADM

## 2019-11-06 RX ORDER — LATANOPROST 50 UG/ML
1 SOLUTION/ DROPS OPHTHALMIC NIGHTLY
Status: DISCONTINUED | OUTPATIENT
Start: 2019-11-06 | End: 2019-11-19 | Stop reason: HOSPADM

## 2019-11-06 RX ORDER — OXYCODONE AND ACETAMINOPHEN 7.5; 325 MG/1; MG/1
1 TABLET ORAL EVERY 4 HOURS PRN
Status: DISPENSED | OUTPATIENT
Start: 2019-11-06 | End: 2019-11-16

## 2019-11-06 RX ORDER — NICOTINE POLACRILEX 4 MG
15 LOZENGE BUCCAL
Status: DISCONTINUED | OUTPATIENT
Start: 2019-11-06 | End: 2019-11-19 | Stop reason: HOSPADM

## 2019-11-06 RX ORDER — TRAZODONE HYDROCHLORIDE 50 MG/1
25 TABLET ORAL NIGHTLY PRN
Status: DISCONTINUED | OUTPATIENT
Start: 2019-11-06 | End: 2019-11-19 | Stop reason: HOSPADM

## 2019-11-06 RX ORDER — CLINDAMYCIN PHOSPHATE 900 MG/50ML
900 INJECTION INTRAVENOUS EVERY 8 HOURS
Qty: 1750 ML | Refills: 0
Start: 2019-11-06 | End: 2019-11-18

## 2019-11-06 RX ORDER — CLINDAMYCIN PHOSPHATE 900 MG/50ML
900 INJECTION INTRAVENOUS EVERY 8 HOURS
Status: DISPENSED | OUTPATIENT
Start: 2019-11-06 | End: 2019-11-19

## 2019-11-06 RX ORDER — ROSUVASTATIN CALCIUM 10 MG/1
40 TABLET, COATED ORAL NIGHTLY
Status: DISCONTINUED | OUTPATIENT
Start: 2019-11-07 | End: 2019-11-19 | Stop reason: HOSPADM

## 2019-11-06 RX ORDER — NYSTATIN 100000 [USP'U]/G
POWDER TOPICAL EVERY 12 HOURS SCHEDULED
Status: DISCONTINUED | OUTPATIENT
Start: 2019-11-06 | End: 2019-11-19 | Stop reason: HOSPADM

## 2019-11-06 RX ORDER — DULOXETIN HYDROCHLORIDE 60 MG/1
60 CAPSULE, DELAYED RELEASE ORAL DAILY
Status: DISCONTINUED | OUTPATIENT
Start: 2019-11-07 | End: 2019-11-19 | Stop reason: HOSPADM

## 2019-11-06 RX ORDER — BUMETANIDE 1 MG/1
0.5 TABLET ORAL DAILY
Status: DISCONTINUED | OUTPATIENT
Start: 2019-11-07 | End: 2019-11-19 | Stop reason: HOSPADM

## 2019-11-06 RX ORDER — SODIUM POLYSTYRENE SULFONATE 15 G/60ML
15 SUSPENSION ORAL; RECTAL ONCE
Status: COMPLETED | OUTPATIENT
Start: 2019-11-06 | End: 2019-11-06

## 2019-11-06 RX ORDER — ASPIRIN 81 MG/1
81 TABLET ORAL DAILY
Status: DISCONTINUED | OUTPATIENT
Start: 2019-11-07 | End: 2019-11-19 | Stop reason: HOSPADM

## 2019-11-06 RX ORDER — SODIUM CHLORIDE 0.9 % (FLUSH) 0.9 %
10 SYRINGE (ML) INJECTION EVERY 12 HOURS SCHEDULED
Status: DISCONTINUED | OUTPATIENT
Start: 2019-11-06 | End: 2019-11-19 | Stop reason: HOSPADM

## 2019-11-06 RX ORDER — ACETAMINOPHEN 650 MG/1
650 SUPPOSITORY RECTAL EVERY 4 HOURS PRN
Status: DISCONTINUED | OUTPATIENT
Start: 2019-11-06 | End: 2019-11-19 | Stop reason: HOSPADM

## 2019-11-06 RX ORDER — ACETAMINOPHEN 160 MG/5ML
650 SOLUTION ORAL EVERY 4 HOURS PRN
Status: DISCONTINUED | OUTPATIENT
Start: 2019-11-06 | End: 2019-11-19 | Stop reason: HOSPADM

## 2019-11-06 RX ORDER — DIPHENHYDRAMINE HYDROCHLORIDE 50 MG/ML
25 INJECTION INTRAMUSCULAR; INTRAVENOUS EVERY 6 HOURS PRN
Status: DISCONTINUED | OUTPATIENT
Start: 2019-11-06 | End: 2019-11-19 | Stop reason: HOSPADM

## 2019-11-06 RX ADMIN — INSULIN LISPRO 16 UNITS: 100 INJECTION, SOLUTION INTRAVENOUS; SUBCUTANEOUS at 12:13

## 2019-11-06 RX ADMIN — GABAPENTIN 100 MG: 100 CAPSULE ORAL at 08:31

## 2019-11-06 RX ADMIN — OXYCODONE HYDROCHLORIDE AND ACETAMINOPHEN 1 TABLET: 7.5; 325 TABLET ORAL at 03:34

## 2019-11-06 RX ADMIN — DULOXETINE HYDROCHLORIDE 60 MG: 30 CAPSULE, DELAYED RELEASE ORAL at 08:32

## 2019-11-06 RX ADMIN — CLINDAMYCIN IN 5 PERCENT DEXTROSE 900 MG: 18 INJECTION, SOLUTION INTRAVENOUS at 10:42

## 2019-11-06 RX ADMIN — SODIUM POLYSTYRENE SULFONATE 15 G: 15 SUSPENSION ORAL; RECTAL at 12:13

## 2019-11-06 RX ADMIN — ROSUVASTATIN CALCIUM 40 MG: 20 TABLET, FILM COATED ORAL at 08:32

## 2019-11-06 RX ADMIN — Medication 250 MG: at 08:31

## 2019-11-06 RX ADMIN — BUMETANIDE 0.5 MG: 0.5 TABLET ORAL at 08:32

## 2019-11-06 RX ADMIN — METHYLNALTREXONE BROMIDE 8 MG: 12 INJECTION, SOLUTION SUBCUTANEOUS at 08:32

## 2019-11-06 RX ADMIN — INSULIN DETEMIR 20 UNITS: 100 INJECTION, SOLUTION SUBCUTANEOUS at 08:37

## 2019-11-06 RX ADMIN — SODIUM CHLORIDE, PRESERVATIVE FREE 10 ML: 5 INJECTION INTRAVENOUS at 08:32

## 2019-11-06 RX ADMIN — ASPIRIN 81 MG: 81 TABLET ORAL at 08:32

## 2019-11-06 RX ADMIN — NYSTATIN: 100000 POWDER TOPICAL at 08:33

## 2019-11-06 RX ADMIN — POLYETHYLENE GLYCOL 3350 17 G: 17 POWDER, FOR SOLUTION ORAL at 08:32

## 2019-11-06 RX ADMIN — LACTULOSE 20 G: 20 SOLUTION ORAL at 08:32

## 2019-11-06 RX ADMIN — INSULIN LISPRO 8 UNITS: 100 INJECTION, SOLUTION INTRAVENOUS; SUBCUTANEOUS at 08:36

## 2019-11-06 RX ADMIN — CLINDAMYCIN IN 5 PERCENT DEXTROSE 900 MG: 18 INJECTION, SOLUTION INTRAVENOUS at 03:03

## 2019-11-06 RX ADMIN — OXYCODONE HYDROCHLORIDE AND ACETAMINOPHEN 1 TABLET: 7.5; 325 TABLET ORAL at 08:39

## 2019-11-06 NOTE — PAYOR COMM NOTE
"Erick Luong (64 y.o. Male) [id number]   Cont stay  Pt remains  In hospital   [facility] phone    Fax        Date of Birth Social Security Number Address Home Phone MRN    1955  [id number]  [place] [phone] [id number]    [employer] Status          [employer]        Admission Date Admission Type Admitting Provider Attending Provider Department, Room/Bed    10/27/19 Emergency Willy Rollins MD Moore, Jonathan Scott, MD [facility] 3C, 379/1    Discharge Date Discharge Disposition Discharge Destination                       Attending Provider:  Willy Rollins MD    Allergies:  Bactrim [Sulfamethoxazole-trimethoprim], Vancomycin    Isolation:  Contact   Infection:  MRSA (05/19/19)   Code Status:  CPR    Ht:  182.9 cm (72\")   Wt:  145 kg (319 lb 3.2 oz)    Admission Cmt:  None   Principal Problem:  None                Active Insurance as of 10/27/2019     Primary Coverage     Payor Plan Insurance Group Employer/Plan Group    [facility] [employer] [facility] [employer] [id number]     Payor Plan Address Payor Plan Phone Number Payor Plan Fax Number Effective Dates    [address] [phone]  1/1/2018 - None Entered    [facility] [id number]       Subscriber Name Subscriber Birth Date Member ID       ZAINAB LUONG 11/27/1969 [id number]           Secondary Coverage     Payor Plan Insurance Group Employer/Plan Group    MEDICARE MEDICARE A & B      Payor Plan Address Payor Plan Phone Number Payor Plan Fax Number Effective Dates    [address] [phone]  7/1/2013 - None Entered    [facility] [address]       Subscriber Name Subscriber Birth Date Member ID       ERICK LUONG 1955 [id number]                 Emergency Contacts      (Rel.) Home Phone Work Phone Mobile Phone    Zainab Luong (Spouse) [phone]            Hospital Medications (active)  " "     Dose Frequency Start End    acetaminophen (TYLENOL) 160 MG/5ML solution 650 mg 650 mg Every 4 Hours PRN 10/27/2019     Sig - Route: Take 20.3 mL by mouth Every 4 (Four) Hours As Needed for Mild Pain . - Oral    Linked Group 1:  \"Or\" Linked Group Details        acetaminophen (TYLENOL) suppository 650 mg 650 mg Every 4 Hours PRN 10/27/2019     Sig - Route: Insert 1 suppository into the rectum Every 4 (Four) Hours As Needed for Mild Pain . - Rectal    Linked Group 1:  \"Or\" Linked Group Details        acetaminophen (TYLENOL) tablet 650 mg 650 mg Every 4 Hours PRN 10/27/2019     Sig - Route: Take 2 tablets by mouth Every 4 (Four) Hours As Needed for Mild Pain . - Oral    Linked Group 1:  \"Or\" Linked Group Details        aspirin EC tablet 81 mg 81 mg Daily 10/28/2019     Sig - Route: Take 1 tablet by mouth Daily. - Oral    bisacodyl (DULCOLAX) suppository 10 mg 10 mg Daily PRN 10/31/2019     Sig - Route: Insert 1 suppository into the rectum Daily As Needed for Constipation. - Rectal    bumetanide (BUMEX) tablet 0.5 mg 0.5 mg Daily 11/6/2019     Sig - Route: Take 1 tablet by mouth Daily. - Oral    clindamycin (CLEOCIN) 900 mg in dextrose 5% 50 mL IVPB (premix) 900 mg Every 8 Hours 10/30/2019 11/18/2019    Sig - Route: Infuse 50 mL into a venous catheter Every 8 (Eight) Hours. - Intravenous    dextrose (D50W) 25 g/ 50mL Intravenous Solution 25 g 25 g Every 15 Minutes PRN 11/1/2019     Sig - Route: Infuse 50 mL into a venous catheter Every 15 (Fifteen) Minutes As Needed for Low Blood Sugar (Blood Sugar Less Than 70). - Intravenous    dextrose (GLUTOSE) oral gel 15 g 15 g Every 15 Minutes PRN 11/1/2019     Sig - Route: Take 15 application by mouth Every 15 (Fifteen) Minutes As Needed for Low Blood Sugar (Blood sugar less than 70). - Oral    diphenhydrAMINE (BENADRYL) injection 25 mg 25 mg Every 6 Hours PRN 10/29/2019     Sig - Route: Infuse 0.5 mL into a venous catheter Every 6 (Six) Hours As Needed for Itching. - " Intravenous    DULoxetine (CYMBALTA) DR capsule 60 mg 60 mg Daily 10/28/2019     Sig - Route: Take 2 capsules by mouth Daily. - Oral    enoxaparin (LOVENOX) syringe 30 mg 30 mg Every 24 Hours 10/29/2019     Sig - Route: Inject 0.3 mL under the skin into the appropriate area as directed Daily. - Subcutaneous    gabapentin (NEURONTIN) capsule 100 mg 100 mg 2 Times Daily 10/27/2019     Sig - Route: Take 1 capsule by mouth 2 (Two) Times a Day. - Oral    glucagon (human recombinant) (GLUCAGEN DIAGNOSTIC) injection 1 mg 1 mg Every 15 Minutes PRN 11/1/2019     Sig - Route: Inject 1 mg under the skin into the appropriate area as directed Every 15 (Fifteen) Minutes As Needed for Low Blood Sugar (Blood Glucose Less Than 70). - Subcutaneous    Influenza Vac Subunit Quad (FLUCELVAX) injection 0.5 mL 0.5 mL During Hospitalization 10/28/2019     Sig - Route: Inject 0.5 mL into the appropriate muscle as directed by prescriber During Hospitalization for Immunization. - Intramuscular    Cosign for Ordering: Accepted by Abebe Garzon DO on 10/28/2019  3:20 AM    insulin detemir (LEVEMIR) injection 20 Units 20 Units Every 12 Hours Scheduled 11/3/2019     Sig - Route: Inject 20 Units under the skin into the appropriate area as directed Every 12 (Twelve) Hours. - Subcutaneous    insulin lispro (humaLOG) injection 0-24 Units 0-24 Units 4 Times Daily With Meals & Nightly 11/1/2019     Sig - Route: Inject 0-24 Units under the skin into the appropriate area as directed 4 (Four) Times a Day With Meals & at Bedtime. - Subcutaneous    ipratropium-albuterol (DUO-NEB) nebulizer solution 3 mL 3 mL Every 6 Hours PRN 10/27/2019     Sig - Route: Take 3 mL by nebulization Every 6 (Six) Hours As Needed for Shortness of Air. - Nebulization    lactulose solution 20 g 20 g 2 Times Daily 10/27/2019     Sig - Route: Take 30 mL by mouth 2 (Two) Times a Day. - Oral    latanoprost (XALATAN) 0.005 % ophthalmic solution 1 drop 1 drop Nightly 10/28/2019      Sig - Route: Administer 1 drop to both eyes Every Night. - Both Eyes    magnesium hydroxide (MILK OF MAGNESIA) suspension 2400 mg/10mL 10 mL 10 mL Daily PRN 10/31/2019     Sig - Route: Take 10 mL by mouth Daily As Needed for Constipation. - Oral    methylnaltrexone (RELISTOR) injection 8 mg 8 mg Every Other Day 10/27/2019     Sig - Route: Inject 0.4 mL under the skin into the appropriate area as directed Every Other Day. - Subcutaneous    nystatin (MYCOSTATIN) powder  Every 12 Hours Scheduled 10/31/2019     Sig - Route: Apply  topically to the appropriate area as directed Every 12 (Twelve) Hours. - Topical    ondansetron (ZOFRAN) injection 4 mg 4 mg Every 4 Hours PRN 11/5/2019     Sig - Route: Infuse 2 mL into a venous catheter Every 4 (Four) Hours As Needed for Nausea or Vomiting. - Intravenous    oxyCODONE-acetaminophen (PERCOCET) 7.5-325 MG per tablet 1 tablet 1 tablet Every 4 Hours PRN 10/28/2019 11/7/2019    Sig - Route: Take 1 tablet by mouth Every 4 (Four) Hours As Needed for Moderate Pain  or Severe Pain . - Oral    pantoprazole (PROTONIX) EC tablet 40 mg 40 mg Nightly 11/1/2019     Sig - Route: Take 1 tablet by mouth Every Night. - Oral    polyethylene glycol 3350 powder (packet) 17 g Daily 11/1/2019     Sig - Route: Take 17 g by mouth Daily. - Oral    rosuvastatin (CRESTOR) tablet 40 mg 40 mg Daily 10/28/2019     Sig - Route: Take 2 tablets by mouth Daily. - Oral    saccharomyces boulardii (FLORASTOR) capsule 250 mg 250 mg 2 Times Daily 10/27/2019     Sig - Route: Take 1 capsule by mouth 2 (Two) Times a Day. - Oral    sodium chloride 0.9 % flush 10 mL 10 mL As Needed 10/27/2019     Sig - Route: Infuse 10 mL into a venous catheter As Needed for Line Care. - Intravenous    Cosign for Ordering: Accepted by Reuben Tadeo MD on 10/27/2019  7:35 PM    sodium chloride 0.9 % flush 10 mL 10 mL Every 12 Hours Scheduled 10/27/2019     Sig - Route: Infuse 10 mL into a venous catheter Every 12 (Twelve)  Hours. - Intravenous    sodium chloride 0.9 % flush 10 mL 10 mL As Needed 10/27/2019     Sig - Route: Infuse 10 mL into a venous catheter As Needed for Line Care. - Intravenous    sodium polystyrene (KAYEXALATE) 15 GM/60ML suspension 15 g 15 g Once 11/6/2019 11/6/2019    Sig - Route: Take 60 mL by mouth 1 (One) Time. - Oral    traZODone (DESYREL) tablet 25 mg 25 mg Nightly PRN 10/27/2019     Sig - Route: Take 0.5 tablets by mouth At Night As Needed for Sleep. - Oral             Physician Progress Notes (last 24 hours) (Notes from 11/05/19 1330 through 11/06/19 1330)      Stewart Alvarez, APRN at 11/06/19 1045          Nephrology ([facility]) Progress Note      Patient:  Erick Luong  YOB: 1955  Date of Service: 11/6/2019  MRN: [id number]   Acct: [id number]   Primary Care Physician: Del Shetty MD  Advance Directive:   Code Status and Medical Interventions:   Ordered at: 10/27/19 2021     Level Of Support Discussed With:    Patient     Code Status:    CPR     Medical Interventions (Level of Support Prior to Arrest):    Full     Admit Date: 10/27/2019       Hospital Day: 10  Referring Provider: Abebe Garzon,       Patient personally seen and examined.  Complete chart including Consults, Notes, Operative Reports, Labs, Cardiology, and Radiology studies reviewed as able.        Subjective:  Erick Luong is a 64 y.o. male  whom we were consulted for acute kidney injury.  Baseline chronic kidney disease stage 3, baseline creatinine 1.3.  Has followed with our office on an irregular basis in the past.  History of type 2 diabetes, hypertension, congestive heart failure.  Undergoing treatment for chronic right foot wound; has PICC line in place and has been getting IV Vancomycin.  Last few days has developed increased edema, dyspnea. Unable to lay flat at night.  Diminished urine output.  Hospital course remarkable for initiation of Bumex drip with excellent  response. Transitioned to intermittent IV dose Bumex on 10/29.  Night of 10/29 had apparent allergic reaction to Vancomycin; required a rapid response call.  Has been maintaining good output with intermittent Bumex    Today is actually feeling better.  Edema improving, no nausea or abdominal pain today. Urine output nonoliguric    Allergies:  Bactrim [sulfamethoxazole-trimethoprim] and Vancomycin    Home Meds:  Medications Prior to Admission   Medication Sig Dispense Refill Last Dose   • aspirin 81 MG EC tablet Take 81 mg by mouth Daily.   10/27/2019 at Unknown time   • bumetanide (BUMEX) 2 MG tablet Take 1 tablet by mouth 2 (Two) Times a Day As Needed (edema). (Patient taking differently: Take 2 mg by mouth 2 (Two) Times a Day.) 30 tablet 2 10/27/2019 at Unknown time   • DULoxetine (CYMBALTA) 60 MG capsule Take 60 mg by mouth Daily.   10/27/2019 at Unknown time   • gabapentin (NEURONTIN) 100 MG capsule Take 100 mg by mouth 2 (Two) Times a Day.   10/27/2019 at Unknown time   • insulin glargine (LANTUS) 100 UNIT/ML injection Inject 20 Units under the skin into the appropriate area as directed Every Night.   10/27/2019 at Unknown time   • insulin regular (humuLIN R) 500 UNIT/ML CONCENTRATED injection Inject 100 Units under the skin 3 (Three) Times a Day Before Meals. Packaging states 100 units with regular meals; 120 units with large meals or 360 units daily   10/27/2019 at Unknown time   • lactulose (CHRONULAC) 10 GM/15ML solution Take 20 g by mouth 3 (Three) Times a Day As Needed.   10/27/2019 at Unknown time   • latanoprost (XALATAN) 0.005 % ophthalmic solution Administer 1 drop to both eyes Every Night.   10/27/2019 at Unknown time   • losartan (COZAAR) 100 MG tablet Take 100 mg by mouth Daily.   10/27/2019 at Unknown time   • ondansetron ODT (ZOFRAN-ODT) 8 MG disintegrating tablet Take 8 mg by mouth Every 6 (Six) Hours As Needed for Nausea or Vomiting.   10/27/2019 at Unknown time   • oxyCODONE-acetaminophen  (PERCOCET)  MG per tablet Take 1 tablet by mouth 2 (Two) Times a Day.   10/27/2019 at Unknown time   • pantoprazole (PROTONIX) 40 MG EC tablet Take 1 tablet by mouth Daily. 90 tablet 3 10/27/2019 at Unknown time   • rosuvastatin (CRESTOR) 40 MG tablet Take 40 mg by mouth Daily.   10/27/2019 at Unknown time   • saccharomyces boulardii (FLORASTOR) 250 MG capsule Take 250 mg by mouth 2 (Two) Times a Day.   10/27/2019 at Unknown time   • traZODone (DESYREL) 50 MG tablet Take 25-50 mg by mouth At Night As Needed for Sleep.   10/26/2019 at Unknown time       Medicines:  Current Facility-Administered Medications   Medication Dose Route Frequency Provider Last Rate Last Dose   • acetaminophen (TYLENOL) tablet 650 mg  650 mg Oral Q4H PRN Abebe Garzon,         Or   • acetaminophen (TYLENOL) 160 MG/5ML solution 650 mg  650 mg Oral Q4H PRN Abebe Garzon DO        Or   • acetaminophen (TYLENOL) suppository 650 mg  650 mg Rectal Q4H PRN Abebe Garzon DO       • aspirin EC tablet 81 mg  81 mg Oral Daily Abebe Garzon DO   81 mg at 11/06/19 0832   • bisacodyl (DULCOLAX) suppository 10 mg  10 mg Rectal Daily PRN Raquel Duncan APRN   10 mg at 11/01/19 0101   • bumetanide (BUMEX) tablet 0.5 mg  0.5 mg Oral Daily Stewart Alvarez APRN   0.5 mg at 11/06/19 0832   • clindamycin (CLEOCIN) 900 mg in dextrose 5% 50 mL IVPB (premix)  900 mg Intravenous Q8H Julia Adrian MD 50 mL/hr at 11/06/19 1042 900 mg at 11/06/19 1042   • dextrose (D50W) 25 g/ 50mL Intravenous Solution 25 g  25 g Intravenous Q15 Min PRN Telly Rodriguez MD       • dextrose (GLUTOSE) oral gel 15 g  15 g Oral Q15 Min PRN Telly Rodriguez MD       • diphenhydrAMINE (BENADRYL) injection 25 mg  25 mg Intravenous Q6H PRN Raquel Duncan APRN   25 mg at 10/29/19 2336   • DULoxetine (CYMBALTA) DR capsule 60 mg  60 mg Oral Daily Abebe Garzon DO   60 mg at 11/06/19 0832   • enoxaparin (LOVENOX) syringe 30 mg  30 mg  Subcutaneous Q24H Telly Rodriguez MD   30 mg at 11/05/19 2030   • gabapentin (NEURONTIN) capsule 100 mg  100 mg Oral BID Abebe Garzon DO   100 mg at 11/06/19 0831   • glucagon (human recombinant) (GLUCAGEN DIAGNOSTIC) injection 1 mg  1 mg Subcutaneous Q15 Min PRN Telly Rodriguez MD       • Influenza Vac Subunit Quad (FLUCELVAX) injection 0.5 mL  0.5 mL Intramuscular During Hospitalization Abebe Garzon DO       • insulin detemir (LEVEMIR) injection 20 Units  20 Units Subcutaneous Q12H Telly Rodriguez MD   20 Units at 11/06/19 0837   • insulin lispro (humaLOG) injection 0-24 Units  0-24 Units Subcutaneous 4x Daily With Meals & Nightly Telly Rodriguez MD   8 Units at 11/06/19 0836   • ipratropium-albuterol (DUO-NEB) nebulizer solution 3 mL  3 mL Nebulization Q6H PRN Abebe Garzon DO       • lactulose solution 20 g  20 g Oral BID Abebe Garzon DO   20 g at 11/06/19 0832   • latanoprost (XALATAN) 0.005 % ophthalmic solution 1 drop  1 drop Both Eyes Nightly Telly Rodriguez MD   1 drop at 11/05/19 2019   • magnesium hydroxide (MILK OF MAGNESIA) suspension 2400 mg/10mL 10 mL  10 mL Oral Daily PRN Raquel Duncan, APRN   10 mL at 11/04/19 0914   • methylnaltrexone (RELISTOR) injection 8 mg  8 mg Subcutaneous Every Other Day Abebe Garzon DO   8 mg at 11/06/19 0832   • nystatin (MYCOSTATIN) powder   Topical Q12H Raquel Duncan, APRN       • ondansetron (ZOFRAN) injection 4 mg  4 mg Intravenous Q4H PRN Willy Rollins MD       • oxyCODONE-acetaminophen (PERCOCET) 7.5-325 MG per tablet 1 tablet  1 tablet Oral Q4H PRN Telly Rodriguez MD   1 tablet at 11/06/19 0839   • pantoprazole (PROTONIX) EC tablet 40 mg  40 mg Oral Nightly Telly Rodriguez MD   40 mg at 11/05/19 2019   • polyethylene glycol 3350 powder (packet)  17 g Oral Daily Telly Rodriguez MD   17 g at 11/06/19 0832   • rosuvastatin (CRESTOR) tablet 40 mg  40 mg Oral Daily Abebe Garzon DO   40 mg at 11/06/19 0832   •  saccharomyces boulardii (FLORASTOR) capsule 250 mg  250 mg Oral BID Abebe Garzon DO   250 mg at 11/06/19 0831   • sodium chloride 0.9 % flush 10 mL  10 mL Intravenous PRN Reuben Tadeo MD       • sodium chloride 0.9 % flush 10 mL  10 mL Intravenous Q12H Abebe Garzon DO   10 mL at 11/06/19 0832   • sodium chloride 0.9 % flush 10 mL  10 mL Intravenous PRN Abebe Garzon DO       • traZODone (DESYREL) tablet 25 mg  25 mg Oral Nightly PRN Abebe Garzon DO           Past Medical History:  Past Medical History:   Diagnosis Date   • Arthritis    • CHF (congestive heart failure) (CMS/HCC)    • Coronary artery disease    • Diabetes mellitus (CMS/HCC)    • Hyperlipidemia    • Hypertension    • Myocardial infarction (CMS/HCC)    • Pancreatitis    • Renal disorder    • Sleep apnea with use of continuous positive airway pressure (CPAP)        Past Surgical History:  Past Surgical History:   Procedure Laterality Date   • ABDOMINAL SURGERY     • APPENDECTOMY     • BACK SURGERY     • CARDIAC SURGERY     • CATARACT EXTRACTION     • CERVICAL SPINE SURGERY     • COLONOSCOPY N/A 1/31/2017    Normal exam repeat in 5 years   • COLONOSCOPY N/A 2/11/2019    Procedure: COLONOSCOPY WITH ANESTHESIA;  Surgeon: Tyrell Smith MD;  Location: [facility] ENDOSCOPY;  Service: Gastroenterology   • COLONOSCOPY W/ POLYPECTOMY  03/04/2014    Hyperplastic polyp   • CORONARY ARTERY BYPASS GRAFT  10/2015   • ENDOSCOPY  04/13/2011    Gastritis with hemorrhage   • ENDOSCOPY N/A 5/5/2017    Normal exam   • ENDOSCOPY N/A 2/11/2019    Procedure: ESOPHAGOGASTRODUODENOSCOPY WITH ANESTHESIA;  Surgeon: Tyrell Smith MD;  Location: [facility] ENDOSCOPY;  Service: Gastroenterology   • INCISION AND DRAINAGE OF WOUND Left 09/2007    spider bite       Family History  Family History   Problem Relation Age of Onset   • Colon cancer Father    • Heart disease Father    • Colon cancer Sister    • Colon polyps Sister    • Alzheimer's  disease Mother    • Coronary artery disease Sister    • Coronary artery disease Sister        Social History  Social History     Socioeconomic History   • Marital status:      Spouse name: Not on file   • Number of children: Not on file   • Years of education: Not on file   • Highest education level: Not on file   Tobacco Use   • Smoking status: Never Smoker   • Smokeless tobacco: Never Used   • Tobacco comment: smoked in highschool   Substance and Sexual Activity   • Alcohol use: No   • Drug use: No   • Sexual activity: Defer         Review of Systems:  History obtained from chart review and the patient  General ROS: Patient complains of fatigue and No fever or chills  Respiratory ROS: No cough, shortness of breath, wheezing  Cardiovascular ROS: positive for - dyspnea on exertion and edema  No chest pain or palpitations  Gastrointestinal ROS: No abdominal pain or melena  Genito-Urinary ROS: No dysuria or hematuria  Neurological ROS: no headache or dizziness    Objective:  Patient Vitals for the past 24 hrs:   BP Temp Temp src Pulse Resp SpO2 Weight   11/06/19 0635 -- -- -- -- -- -- (!) 145 kg (319 lb 3.2 oz)   11/05/19 2315 123/48 97.5 °F (36.4 °C) Oral 74 18 99 % --       Intake/Output Summary (Last 24 hours) at 11/6/2019 1046  Last data filed at 11/6/2019 0755  Gross per 24 hour   Intake 92.86 ml   Output 2900 ml   Net -2807.14 ml     General: awake/alert    Neck: supple, no JVD  Chest:  clear to auscultation bilaterally without respiratory distress  CVS: regular rate and rhythm  Abdominal: rounded and soft, nontender, positive bowel sounds  Extremities: trace lower extremity edema  Skin: warm and dry without rash   Neuro: no focal motor deficits      Labs:  Results from last 7 days   Lab Units 11/06/19  0544 11/05/19  0559 11/04/19  0713   WBC 10*3/mm3 7.52 7.36 6.91   HEMOGLOBIN g/dL 11.6* 11.2* 11.3*   HEMATOCRIT % 35.4* 34.2* 34.1*   PLATELETS 10*3/mm3 260 263 299         Results from last 7 days   Lab  Units 11/06/19  0544 11/05/19  0559 11/04/19  0713   SODIUM mmol/L 137 136 136   POTASSIUM mmol/L 5.4* 5.0 5.1   CHLORIDE mmol/L 94* 96* 96*   CO2 mmol/L 32.0* 32.0* 31.0*   BUN mg/dL 30* 33* 33*   CREATININE mg/dL 1.67* 1.81* 1.73*   CALCIUM mg/dL 9.0 8.9 9.2   BILIRUBIN mg/dL 0.4 0.4 0.4   ALK PHOS U/L 71 69 75   ALT (SGPT) U/L 13 13 15   AST (SGOT) U/L 13 15 16   GLUCOSE mg/dL 213* 212* 241*       Radiology:   Imaging Results (last 72 hours)     Procedure Component Value Units Date/Time    CT Angiogram Chest [[id number]] Collected:  10/27/19 1906     Updated:  10/27/19 1911    Narrative:       CT ANGIOGRAM CHEST- 10/27/2019 6:32 PM CDT      HISTORY: Chest pain, acute, PE suspected, intermed prob, positive  D-dimer      COMPARISON: 01/18/2018.      DOSE LENGTH PRODUCT: 525 mGy cm. Automated exposure control was also  utilized to decrease patient radiation dose.     TECHNIQUE: Helical tomographic images of the chest were obtained after  the administration of intravenous contrast following angiogram protocol.  Additionally, 3D and multiplanar reformatted images were provided.        FINDINGS:    Pulmonary arteries: There is adequate enhancement of the pulmonary  arteries to evaluate for central and segmental pulmonary emboli. There  are no filling defects within the main, lobar, segmental or visualized  subsegmental pulmonary arteries. The pulmonary arteries are within  normal limits for size.      Aorta and great vessels: The aorta is well opacified and demonstrates no  dissection or aneurysm. The great vessels are normal in appearance.  Changes of previous CABG are noted.     Visualized neck base: The imaged portion of the base of the neck appears  unremarkable.      Lungs: Dependent changes are seen in both lung bases. There is no  pulmonary edema or focal consolidation. No worrisome nodules are  identified. Small pleural effusions are present bilaterally. The trachea  and bronchial tree are patent.      Heart:  Mild dilation of the heart is again noted. There is no  pericardial effusion.      Mediastinum and lymph nodes: No enlarged mediastinal, hilar, or axillary  lymph nodes are present.      Skeletal and soft tissues: The osseous structures of the thorax and  surrounding soft tissues demonstrate no acute process.     Upper abdomen: The imaged portion of the upper abdomen demonstrates no  acute process.        Impression:       1. Small pleural effusions may be due to volume overload.  2. No evidence of pulmonary embolus or pneumonia.        This report was finalized on 10/27/2019 19:08 by Dr. Sudarshan Starr MD.    CT Abdomen Pelvis With Contrast [[id number]] Collected:  10/27/19 1903     Updated:  10/27/19 1908    Narrative:       CT ABDOMEN PELVIS W CONTRAST- 10/27/2019 6:32 PM CDT     HISTORY: Abdominal pain, unspecified       COMPARISON: 10/12/2019.      DOSE LENGTH PRODUCT: 1919 mGy cm. Automated exposure control was also  utilized to decrease patient radiation dose.     TECHNIQUE: Following the intravenous administration of contrast, helical  CT tomographic images of the abdomen and pelvis were acquired.  Multiplanar reformatted images were provided for review.      FINDINGS:   LOWER CHEST: Findings in the chest will be described in a separate  dictation.      LIVER: No focal liver lesion. The hepatic vasculature is patent.      BILIARY SYSTEM: The gallbladder has been removed. There is no evidence  of biliary ductal dilation.      PANCREAS: There is mild atrophy in the pancreas.      SPLEEN: Unremarkable.      KIDNEYS: Simple cysts are seen in the RIGHT kidney. There has been no  significant interval change. There is no hydronephrosis. The LEFT kidney  is unremarkable. The ureters are decompressed and normal in appearance.     ADRENALS: Unremarkable.     RETROPERITONEUM: No mass, lymphadenopathy or hemorrhage.      GI TRACT: No evidence of obstruction or bowel wall thickening. The  appendix has been removed.      OTHER: There is no mesenteric mass, lymphadenopathy or fluid collection.  The abdominopelvic vasculature is patent. The osseous structures and  soft tissues demonstrate no worrisome lesions. Mild subcutaneous edema  is again noted in the lower abdominal wall.      PELVIS: No mass lesion, fluid collection or significant lymphadenopathy  is seen in the pelvis. The urinary bladder is normal in appearance.       Impression:       1. No evidence of acute abdominopelvic process.   2. Subcutaneous edema in the anterior abdomen and in the dependent  portions of the hips and buttocks. Findings could be due to volume  overload.        This report was finalized on 10/27/2019 19:05 by Dr. Sudarshan Starr MD.    XR Chest 1 View [[id number]] Collected:  10/27/19 1640     Updated:  10/27/19 1644    Narrative:       Exam: XR CHEST 1 VW-     Indication: Chest Pain Triage Protocol     Comparison: 10/12/2019     Findings:     Cardiac silhouette is stable. Postoperative change of CABG. Chronic mild  right hemidiaphragm elevation. No definite consolidation, large pleural  effusion, or visible pneumothorax. Left upper extremity PICC is stable  in position. No acute osseous findings.       Impression:          No acute findings.  This report was finalized on 10/27/2019 16:41 by Dr. Santana Figueredo MD.          Culture:  Blood Culture   Date Value Ref Range Status   10/27/2019 No growth at 24 hours  Preliminary   10/27/2019 No growth at 24 hours  Preliminary         Assessment   1.  Acute kidney injury due to ATN--improving  2.  Baseline chronic kidney disease stage 3  3.  Type 2 diabetes with nephropathy  4.  Essential hypertension  5.  Right foot wound  6.  Volume overload--resolving  7.  Anemia    8.  Hyperkalemia     Plan:  1.  Continue current dose Bumex  2.  Kayexalate 15 gm PO once now  3.  Monitor labs    SUSAN Calvert  11/6/2019  10:46 AM    Electronically signed by Stewart Alvarez APRN at 11/06/19 8081    "  Julia Adrian MD at 11/06/19 0802          INFECTIOUS DISEASES PROGRESS NOTE    Patient:  Erick Luong  YOB: 1955  MRN: [id number]   Admit date: 10/27/2019   Admitting Physician: Willy Rollins MD  Primary Care Physician: Del Shetty MD    Chief Complaint: \"I feel better today\"      Interval History:   Patient reports that today's first day he is felt a little better.  Hard for him to be specific about what felt better.  He does state there is less nausea.    He is still not had another bowel movement since Monday.  He states at home he usually goes daily.    He had a KUB yesterday that was unremarkable    Although he states his abdomen is not hurting now he thinks he \"probably needs an enema\"      Allergies:   Allergies   Allergen Reactions   • Bactrim [Sulfamethoxazole-Trimethoprim] Other (See Comments)     \"RENAL FAILURE\"   • Vancomycin Itching       Current Meds:     Current Facility-Administered Medications:   •  acetaminophen (TYLENOL) tablet 650 mg, 650 mg, Oral, Q4H PRN **OR** acetaminophen (TYLENOL) 160 MG/5ML solution 650 mg, 650 mg, Oral, Q4H PRN **OR** acetaminophen (TYLENOL) suppository 650 mg, 650 mg, Rectal, Q4H PRN, Abebe Garzon DO  •  aspirin EC tablet 81 mg, 81 mg, Oral, Daily, Abebe Garzon DO, 81 mg at 11/05/19 0935  •  bisacodyl (DULCOLAX) suppository 10 mg, 10 mg, Rectal, Daily PRN, Raquel Duncan, APRN, 10 mg at 11/01/19 0101  •  bumetanide (BUMEX) tablet 0.5 mg, 0.5 mg, Oral, Daily, Stewart Alvarez, APRN  •  clindamycin (CLEOCIN) 900 mg in dextrose 5% 50 mL IVPB (premix), 900 mg, Intravenous, Q8H, Julia Adrian MD, Last Rate: 50 mL/hr at 11/06/19 0303, 900 mg at 11/06/19 0303  •  dextrose (D50W) 25 g/ 50mL Intravenous Solution 25 g, 25 g, Intravenous, Q15 Min PRN, Telly Rodriguez MD  •  dextrose (GLUTOSE) oral gel 15 g, 15 g, Oral, Q15 Min PRN, Telly Rodriguez MD  •  diphenhydrAMINE (BENADRYL) injection 25 mg, 25 mg, " Intravenous, Q6H PRN, Raquel Duncan, APRN, 25 mg at 10/29/19 2336  •  DULoxetine (CYMBALTA) DR capsule 60 mg, 60 mg, Oral, Daily, Abebe Garzon DO, 60 mg at 11/05/19 0935  •  enoxaparin (LOVENOX) syringe 30 mg, 30 mg, Subcutaneous, Q24H, Telly Rodriguez MD, 30 mg at 11/05/19 2030  •  gabapentin (NEURONTIN) capsule 100 mg, 100 mg, Oral, BID, Abebe Garzon DO, 100 mg at 11/05/19 2019  •  glucagon (human recombinant) (GLUCAGEN DIAGNOSTIC) injection 1 mg, 1 mg, Subcutaneous, Q15 Min PRN, Telly Rodriguez MD  •  Influenza Vac Subunit Quad (FLUCELVAX) injection 0.5 mL, 0.5 mL, Intramuscular, During Hospitalization, Abebe Garzon DO  •  insulin detemir (LEVEMIR) injection 20 Units, 20 Units, Subcutaneous, Q12H, Telly Rodriguez MD, 20 Units at 11/05/19 2018  •  insulin lispro (humaLOG) injection 0-24 Units, 0-24 Units, Subcutaneous, 4x Daily With Meals & Nightly, Telly Rodriguez MD, 24 Units at 11/05/19 2019  •  ipratropium-albuterol (DUO-NEB) nebulizer solution 3 mL, 3 mL, Nebulization, Q6H PRN, Abebe Garzon DO  •  lactulose solution 20 g, 20 g, Oral, BID, Abebe Garzon DO, 20 g at 11/05/19 2019  •  latanoprost (XALATAN) 0.005 % ophthalmic solution 1 drop, 1 drop, Both Eyes, Nightly, Telly Rodriguez MD, 1 drop at 11/05/19 2019  •  magnesium hydroxide (MILK OF MAGNESIA) suspension 2400 mg/10mL 10 mL, 10 mL, Oral, Daily PRN, Raquel Duncan, APRN, 10 mL at 11/04/19 0914  •  methylnaltrexone (RELISTOR) injection 8 mg, 8 mg, Subcutaneous, Every Other Day, Abebe Garzon DO, 8 mg at 11/04/19 0945  •  nystatin (MYCOSTATIN) powder, , Topical, Q12H, Raquel Duncan, SUSAN  •  ondansetron (ZOFRAN) injection 4 mg, 4 mg, Intravenous, Q4H PRN, Willy Rollins MD  •  oxyCODONE-acetaminophen (PERCOCET) 7.5-325 MG per tablet 1 tablet, 1 tablet, Oral, Q4H PRN, Telly Rodriguez MD, 1 tablet at 11/06/19 0334  •  pantoprazole (PROTONIX) EC tablet 40 mg, 40 mg, Oral, Nightly, Telly Rodriguez MD,  "40 mg at 19  •  polyethylene glycol 3350 powder (packet), 17 g, Oral, Daily, Telly Rodriguez MD, 17 g at 19 0936  •  rosuvastatin (CRESTOR) tablet 40 mg, 40 mg, Oral, Daily, Abebe Garzon DO, 40 mg at 19 0934  •  saccharomyces boulardii (FLORASTOR) capsule 250 mg, 250 mg, Oral, BID, Abebe Garzon DO, 250 mg at 19  •  sodium chloride 0.9 % flush 10 mL, 10 mL, Intravenous, PRN, Reuben Tadeo MD  •  sodium chloride 0.9 % flush 10 mL, 10 mL, Intravenous, Q12H, Abebe Garzon DO, 10 mL at 19  •  sodium chloride 0.9 % flush 10 mL, 10 mL, Intravenous, PRN, Abebe Garzon DO  •  traZODone (DESYREL) tablet 25 mg, 25 mg, Oral, Nightly PRN, Abebe Garzon DO      Review of Systems   Constitutional: Negative for fever.   Gastrointestinal: Positive for nausea (Improved).       Objective     Vital Signs:  Temp (24hrs), Av.6 °F (36.4 °C), Min:97.5 °F (36.4 °C), Max:97.7 °F (36.5 °C)      /48 (BP Location: Right arm, Patient Position: Lying)   Pulse 74   Temp 97.5 °F (36.4 °C) (Oral)   Resp 18   Ht 182.9 cm (72\")   Wt (!) 145 kg (319 lb 3.2 oz)   SpO2 99%   BMI 43.29 kg/m²          Physical Exam   General: The patient is an obese gentleman lying flat in bed in no acute distress  HEENT: Sclera anicteric and noninjected  Abdomen: Obese, no focal tenderness, no rebound or guarding  Extremities:    Wound dressed.  This was not removed  Psych: He is pleasant and cooperative  Neuro: Alert and oriented, speech is clear.      Line/IV site: PICC upper arm left, condition patent and no redness.      Results Review:    I reviewed the patient's new clinical results.    Lab Results:  CBC:   Lab Results   Lab 10/31/19  0540 19  0736 19  0628 19  0611 19  0713 19  0559 19  0544   WBC 10.53 10.85* 7.77 7.78 6.91 7.36 7.52   HEMOGLOBIN 11.3* 11.2* 11.0* 11.0* 11.3* 11.2* 11.6*   HEMATOCRIT 33.7* 34.1* 33.7* 33.3* " 34.1* 34.2* 35.4*   PLATELETS 397 339 307 289 299 263 260         CMP:   Lab Results   Lab 11/04/19  0713 11/05/19  0559 11/06/19  0544   SODIUM 136 136 137   POTASSIUM 5.1 5.0 5.4*   CHLORIDE 96* 96* 94*   CO2 31.0* 32.0* 32.0*   BUN 33* 33* 30*   CREATININE 1.73* 1.81* 1.67*   CALCIUM 9.2 8.9 9.0   BILIRUBIN 0.4 0.4 0.4   ALK PHOS 75 69 71   ALT (SGPT) 15 13 13   AST (SGOT) 16 15 13   GLUCOSE 241* 212* 213*         Culture Results:        Microbiology Results Abnormal     Procedure Component Value - Date/Time    Blood Culture - Blood, Hand, Right [[id number]] Collected:  10/27/19 1815    Lab Status:  Final result Specimen:  Blood from Hand, Right Updated:  11/01/19 1845     Blood Culture No growth at 5 days    Blood Culture - Blood, Hand, Right [[id number]] Collected:  10/27/19 1655    Lab Status:  Final result Specimen:  Blood from Hand, Right Updated:  11/01/19 1715     Blood Culture No growth at 5 days          September cultures per Dr. Cerrato  Specimen Information: Foot, Right; Tissue           Tissue Culture     Lab   Light growth (2+) Staphylococcus aureus, MRSA Abnormal    [facility]     Methicillin resistant Staphylococcus aureus, Patient may be an isolation risk.          Gram Stain    Lab   Occasional WBCs seen  [facility]       No organisms seen  [facility]             Susceptibility               Staphylococcus aureus, MRSA       MATT       Clindamycin 0.25 ug/ml Susceptible       Erythromycin 4 ug/ml Intermediate       Inducible Clindamycin Resistance NEG ug/ml Negative       Oxacillin >=4 ug/ml Resistant       Penicillin G >=0.5 ug/ml Resistant       Rifampin <=0.5 ug/ml Susceptible       Tetracycline <=1 ug/ml Susceptible       Trimethoprim + Sulfamethoxazole <=10 ug/ml Susceptible       Vancomycin <=0.5 ug/ml Susceptible                 Susceptibility Comments      Staphylococcus aureus, MRSA   This isolate does not demonstrate inducible clindamycin resistance in vitro.          Specimen Collected:  09/19/19 12:06 Last Resulted: 09/22/19 10:57 Order Details View Encounter Lab                   Radiology:   Imaging Results (Last 72 Hours)     Procedure Component Value Units Date/Time    XR Abdomen KUB [[id number]] Collected:  11/05/19 1637     Updated:  11/05/19 1640    Narrative:       XR ABDOMEN KUB- 11/5/2019 4:25 PM CST     HISTORY: abd pain; N17.9-Acute kidney failure, unspecified; I50.9-Heart  failure, unspecified; N39.0-Urinary tract infection, site not specified;  Z74.09-Other reduced mobility       COMPARISON: None     FINDINGS:  There is a nonspecific bowel gas pattern. No soft tissue mass or  pathologic calcification  is visualized.     No acute skeletal abnormality is identified. Radiopaque monitoring leads  project over the right abdomen       Impression:       1. Non specific bowel gas pattern..         This report was finalized on 11/05/2019 16:37 by Dr. Eddie Winter MD.          Assessment/Plan     Active Hospital Problems    Diagnosis   • Morbid obesity with BMI of 40.0-44.9, adult (CMS/[facility])   • WANDER (acute kidney injury) (CMS/[facility])   • Anemia due to chronic kidney disease   • Diabetic foot infection (CMS/[facility])   • Abdominal pain   • Type 2 diabetes mellitus with hyperglycemia, with long-term current use of insulin (CMS/[facility])     hold insulin the am of procedure     • CAD (coronary artery disease)   • Chronic diastolic congestive heart failure (CMS/[facility])   • Diabetes mellitus (CMS/HCC)       IMPRESSION:  1. Diabetic foot infection with ulcer and underlying osteomyelitis the right foot-followed by Dr. Cerrato as an outpatient.  Patient had been on intravenous vancomycin for this osteomyelitis since October 8 and plan was for 6-week course of therapy.  He has developed a reaction to vancomycin with itching and shortness of breath and a rapid response was called.  It is now listed as an allergy.  He was switched to IV clindamycin October 30  2. Poorly controlled diabetes-blood glucose in  200-400s  3. Chronic kidney disease stage III with baseline creatinine 1.3 per nephrology-  4. Constipation- less abdominal discomfort today.  5. Nausea-improved        RECOMMENDATION:   Continue clindamycin.  -Plan previously was for stop date around November 18  Ordered enema per patient request as no stool since Monday and that was only after enema given.  Review Dr. Cerrato's note, he discussed potential surgical options for patient again as they are concerned that he may eventually need that.    Per Dr. Cerrato's note patient started antibiotic therapy on October 8 and plan was for 6-week course. ( nov 18th end date)      Julia Adrian MD  11/06/19  8:02 AM        Electronically signed by Jluia Adrian MD at 11/06/19 0821     Asad Cerrato DPM at 11/05/19 1431              [facility] - PODIATRY    Today's Date: 11/05/19    Patient Name: Erick Luong  MRN: [id number]  CSN: [id number]  PCP: Del Shetty MD  Referring Provider: Abebe Garzon DO  Attending Provider: Willy Rollins MD  Length of Stay: 9    SUBJECTIVE   Chief Complaint: Right foot wound and infection    HPI: Erick Luong, a 64 y.o.male. No overnight events. Has only minor discomfort to right foot. Continue IV abx per ID. Should have ~2 weeks remaining on planned course of treatment. Plans for possible LTACH placement.      Past Medical History:   Diagnosis Date   • Arthritis    • CHF (congestive heart failure) (CMS/HCC)    • Coronary artery disease    • Diabetes mellitus (CMS/HCC)    • Hyperlipidemia    • Hypertension    • Myocardial infarction (CMS/HCC)    • Pancreatitis    • Renal disorder    • Sleep apnea with use of continuous positive airway pressure (CPAP)      Past Surgical History:   Procedure Laterality Date   • ABDOMINAL SURGERY     • APPENDECTOMY     • BACK SURGERY     • CARDIAC SURGERY     • CATARACT EXTRACTION     • CERVICAL SPINE SURGERY     • COLONOSCOPY N/A 1/31/2017    Normal exam  "repeat in 5 years   • COLONOSCOPY N/A 2/11/2019    Procedure: COLONOSCOPY WITH ANESTHESIA;  Surgeon: Tyrell Smith MD;  Location: [facility] ENDOSCOPY;  Service: Gastroenterology   • COLONOSCOPY W/ POLYPECTOMY  03/04/2014    Hyperplastic polyp   • CORONARY ARTERY BYPASS GRAFT  10/2015   • ENDOSCOPY  04/13/2011    Gastritis with hemorrhage   • ENDOSCOPY N/A 5/5/2017    Normal exam   • ENDOSCOPY N/A 2/11/2019    Procedure: ESOPHAGOGASTRODUODENOSCOPY WITH ANESTHESIA;  Surgeon: Tyrell Smith MD;  Location: [facility] ENDOSCOPY;  Service: Gastroenterology   • INCISION AND DRAINAGE OF WOUND Left 09/2007    spider bite     Family History   Problem Relation Age of Onset   • Colon cancer Father    • Heart disease Father    • Colon cancer Sister    • Colon polyps Sister    • Alzheimer's disease Mother    • Coronary artery disease Sister    • Coronary artery disease Sister      Social History     Socioeconomic History   • Marital status:      Spouse name: Not on file   • Number of children: Not on file   • Years of education: Not on file   • Highest education level: Not on file   Tobacco Use   • Smoking status: Never Smoker   • Smokeless tobacco: Never Used   • Tobacco comment: smoked in [place]   Substance and Sexual Activity   • Alcohol use: No   • Drug use: No   • Sexual activity: Defer     Allergies   Allergen Reactions   • Bactrim [Sulfamethoxazole-Trimethoprim] Other (See Comments)     \"RENAL FAILURE\"   • Vancomycin Itching     Current Facility-Administered Medications   Medication Dose Route Frequency Provider Last Rate Last Dose   • acetaminophen (TYLENOL) tablet 650 mg  650 mg Oral Q4H PRN Abebe Garzon DO        Or   • acetaminophen (TYLENOL) 160 MG/5ML solution 650 mg  650 mg Oral Q4H PRN Abebe Garzon DO        Or   • acetaminophen (TYLENOL) suppository 650 mg  650 mg Rectal Q4H PRN Abebe Garzon DO       • aspirin EC tablet 81 mg  81 mg Oral Daily Abebe Garzon DO   81 mg at " 11/05/19 0935   • bisacodyl (DULCOLAX) suppository 10 mg  10 mg Rectal Daily PRN Raquel Duncan, APRN   10 mg at 11/01/19 0101   • [START ON 11/6/2019] bumetanide (BUMEX) tablet 0.5 mg  0.5 mg Oral Daily Stewart Alvarez, APRN       • clindamycin (CLEOCIN) 900 mg in dextrose 5% 50 mL IVPB (premix)  900 mg Intravenous Q8H Julia Adrian MD   Stopped at 11/05/19 1220   • dextrose (D50W) 25 g/ 50mL Intravenous Solution 25 g  25 g Intravenous Q15 Min PRN Telly Rodriguez MD       • dextrose (GLUTOSE) oral gel 15 g  15 g Oral Q15 Min PRN Telly Rodriguez MD       • diphenhydrAMINE (BENADRYL) injection 25 mg  25 mg Intravenous Q6H PRN Raquel Duncan, APRN   25 mg at 10/29/19 2336   • DULoxetine (CYMBALTA) DR capsule 60 mg  60 mg Oral Daily Abebe Garzon DO   60 mg at 11/05/19 0935   • enoxaparin (LOVENOX) syringe 30 mg  30 mg Subcutaneous Q24H Telly Rodriguez MD   30 mg at 11/04/19 2036   • gabapentin (NEURONTIN) capsule 100 mg  100 mg Oral BID Abebe Garzon DO   100 mg at 11/05/19 0939   • glucagon (human recombinant) (GLUCAGEN DIAGNOSTIC) injection 1 mg  1 mg Subcutaneous Q15 Min PRN Telly Rodriguez MD       • Influenza Vac Subunit Quad (FLUCELVAX) injection 0.5 mL  0.5 mL Intramuscular During Hospitalization Abebe Garzon DO       • insulin detemir (LEVEMIR) injection 20 Units  20 Units Subcutaneous Q12H Telly Rodriguez MD   20 Units at 11/05/19 0834   • insulin lispro (humaLOG) injection 0-24 Units  0-24 Units Subcutaneous 4x Daily With Meals & Nightly Telly Rodriguez MD   16 Units at 11/05/19 1235   • ipratropium-albuterol (DUO-NEB) nebulizer solution 3 mL  3 mL Nebulization Q6H PRN Abebe Garzon DO       • lactulose solution 20 g  20 g Oral BID Abebe Garzon DO   20 g at 11/05/19 0936   • latanoprost (XALATAN) 0.005 % ophthalmic solution 1 drop  1 drop Both Eyes Nightly Telly Rodriguez MD   1 drop at 11/04/19 2036   • magnesium hydroxide (MILK OF MAGNESIA) suspension 6839  mg/10mL 10 mL  10 mL Oral Daily PRN Raquel Duncan, APRN   10 mL at 11/04/19 0914   • methylnaltrexone (RELISTOR) injection 8 mg  8 mg Subcutaneous Every Other Day Abebe Garzon DO   8 mg at 11/04/19 0945   • nystatin (MYCOSTATIN) powder   Topical Q12H Raquel Duncan, APRN       • ondansetron (ZOFRAN) injection 4 mg  4 mg Intravenous Q4H PRN Willy Rollins MD       • oxyCODONE-acetaminophen (PERCOCET) 7.5-325 MG per tablet 1 tablet  1 tablet Oral Q4H PRN Telly Rodriguez MD   1 tablet at 11/05/19 0834   • pantoprazole (PROTONIX) EC tablet 40 mg  40 mg Oral Nightly Telly Rodriguez MD   40 mg at 11/04/19 2036   • polyethylene glycol 3350 powder (packet)  17 g Oral Daily Telly Rodriguez MD   17 g at 11/05/19 0936   • rosuvastatin (CRESTOR) tablet 40 mg  40 mg Oral Daily Abebe Garzon DO   40 mg at 11/05/19 0934   • saccharomyces boulardii (FLORASTOR) capsule 250 mg  250 mg Oral BID Abebe Garzon DO   250 mg at 11/05/19 0936   • sodium chloride 0.9 % flush 10 mL  10 mL Intravenous PRN Reuben Tadeo MD       • sodium chloride 0.9 % flush 10 mL  10 mL Intravenous Q12H Abebe Garzon DO   10 mL at 11/05/19 0835   • sodium chloride 0.9 % flush 10 mL  10 mL Intravenous PRN Abebe Garzon DO       • traZODone (DESYREL) tablet 25 mg  25 mg Oral Nightly PRN Abebe Garzon DO         Review of Systems   Constitutional: Negative for chills and fever.   HENT: Negative for congestion.    Respiratory: Positive for shortness of breath.    Cardiovascular: Positive for leg swelling. Negative for chest pain.   Gastrointestinal: Negative for constipation, diarrhea, nausea and vomiting.   Musculoskeletal: Positive for arthralgias, back pain and gait problem.   Skin: Positive for wound.   Neurological: Positive for weakness and numbness.       OBJECTIVE     Vitals:    11/05/19 1013   BP: 130/61   Pulse: 84   Resp: 14   Temp: 97.7 °F (36.5 °C)   SpO2: 98%       PHYSICAL EXAM  GEN:    A&Ox3, NAD. Pt presents in hospital bed. Accompanied by PT.     Foot/Ankle Exam:       General:   Orientation: AAOx3    Affect: appropriate       Right Foot/Ankle:      Vascular   Dorsalis pedis:  2+  Posterior tibial:  2+  Skin Temperature: warm    Edema Grading:  Pitting and +2  CFT:  3     Neurologic   Light touch sensation:  Diminished  Vibratory sensation:  Diminished  Hot/Cold sensation: diminished    Protective Sensation using [place] Monofilament:  Diminished     Musculoskeletal   Ecchymosis:  None  Tenderness comment:  Mildly to plantar ulceration     Muscle Strength   Normal strength    Foot dorsiflexion:  5  Foot plantar flexion:  5  Foot inversion:  5  Foot eversion:  5     Range of Motion   Foot and ankle ROM within normal limits       Dermatologic   Skin: drainage, erythema and ulcer    Skin: no right foot cellulitis        Additional Comments Large ulceration noted plantar fifth metatarsal head right foot.  Mild surrounding callus formation. No purulence or malodor. Continues probe to bone centrally but bone is hard and non-fragmented. Wound roughly measures 2.5 cm x 2.5 cm x 0.6 cm.     Left Foot/Ankle:      Vascular   Dorsalis pedis:  2+  Posterior tibial:  2+  Skin Temperature: warm    Edema Grading:  +2 and pitting  CFT:  3     Neurologic   Light touch sensation:  Diminished  Vibratory sensation:  Diminished  Hot/cold sensation: diminished    Protective Sensation using [place] Monofilament:  Diminished     Musculoskeletal   Ecchymosis:  None  Tenderness: none       Muscle Strength   Normal strength    Foot dorsiflexion:  5  Foot plantar flexion:  5  Foot inversion:  5  Foot eversion:  5     Range of Motion   Foot and ankle ROM within normal limits       Dermatologic   Skin: skin intact       Image:       RADIOLOGY/NUCLEAR:  Nm Lung Ventilation Perfusion    Result Date: 10/30/2019  Narrative: EXAMINATION: NM LUNG VENTILATION PERFUSION- 10/30/2019 11:02 AM CDT  HISTORY: Acute  onset chest pain  Comparison: Chest x-ray 10/29/2019, nuclear medicine VQ scan 08/29/2016  Radiopharmaceutical: 9.5 mCi of Xenon-133 for the ventilation study and 5.46 mCi Tc 99m MAA for the perfusion study.  Technique: Following inhalation of the aerosolized radiopharmaceutical, the ventilation study was performed with images of the thorax being obtained in posterior projection. Thereafter, the perfusion study was performed following the injection of radiolabeled MAA particles with images of the thorax obtained in 8 projections.  FINDINGS:  No pleural based, wedge-shape, matched or mismatched segmental ventilation perfusion defects are demonstrated.       Impression: Findings are most commonly associated with low probability for acute pulmonary embolism.  This report was finalized on 10/30/2019 11:02 by Dr. Justen Figueroa MD.    Ct Abdomen Pelvis With Contrast    Result Date: 10/27/2019  Narrative: CT ABDOMEN PELVIS W CONTRAST- 10/27/2019 6:32 PM CDT  HISTORY: Abdominal pain, unspecified   COMPARISON: 10/12/2019.  DOSE LENGTH PRODUCT: 1919 mGy cm. Automated exposure control was also utilized to decrease patient radiation dose.  TECHNIQUE: Following the intravenous administration of contrast, helical CT tomographic images of the abdomen and pelvis were acquired. Multiplanar reformatted images were provided for review.  FINDINGS: LOWER CHEST: Findings in the chest will be described in a separate dictation.  LIVER: No focal liver lesion. The hepatic vasculature is patent.  BILIARY SYSTEM: The gallbladder has been removed. There is no evidence of biliary ductal dilation.  PANCREAS: There is mild atrophy in the pancreas.  SPLEEN: Unremarkable.  KIDNEYS: Simple cysts are seen in the RIGHT kidney. There has been no significant interval change. There is no hydronephrosis. The LEFT kidney is unremarkable. The ureters are decompressed and normal in appearance.  ADRENALS: Unremarkable.  RETROPERITONEUM: No mass,  lymphadenopathy or hemorrhage.  GI TRACT: No evidence of obstruction or bowel wall thickening. The appendix has been removed.  OTHER: There is no mesenteric mass, lymphadenopathy or fluid collection. The abdominopelvic vasculature is patent. The osseous structures and soft tissues demonstrate no worrisome lesions. Mild subcutaneous edema is again noted in the lower abdominal wall.  PELVIS: No mass lesion, fluid collection or significant lymphadenopathy is seen in the pelvis. The urinary bladder is normal in appearance.      Impression: 1. No evidence of acute abdominopelvic process. 2. Subcutaneous edema in the anterior abdomen and in the dependent portions of the hips and buttocks. Findings could be due to volume overload.   This report was finalized on 10/27/2019 19:05 by Dr. Sudarshan Starr MD.    Ct Abdomen Pelvis With Contrast    Result Date: 10/12/2019  Narrative: EXAMINATION:  CT ABDOMEN PELVIS W CONTRAST-  10/12/2019 4:45 AM CDT  HISTORY: Abdominal pain. Elevated white blood cell count of 11,770.  TECHNIQUE: Spiral CT was performed of the abdomen and pelvis with contrast. Multiplanar images were reconstructed.  DLP: 980 mGy-cm. Automated dosage control was utilized.  COMPARISON: 03/04/2019.  LUNG BASES: There are small bilateral pleural effusions. A small subpleural nodule in the right lower lobe laterally on image 1 of series 3 is clearly calcified on the previous study on 02/07/2019. There is mild dependent atelectasis. Coronary artery calcification is noted.  LIVER AND SPLEEN: Prior cholecystectomy. The liver and spleen are unremarkable.  PANCREAS: There is fatty atrophy of the pancreas.  KIDNEYS AND ADRENALS: The adrenal glands demonstrate no abnormality. There are 2 lower pole renal cyst on the right with the larger measuring 3.6 cm. The ureters are nondilated. The urinary bladder is unremarkable.  BOWEL: There is underdistention of the stomach. Small bowel loops are nondilated. Moderate stool in the  colon. Prior history of appendectomy.  OTHER: Small retroperitoneal lymph nodes are likely reactive. There is mild atheromatous disease of the aortoiliac vessels. There are degenerative changes of the spine.      Impression: 1. Small bilateral pleural effusions with adjacent atelectasis. A small nodule in the right lower lobe is clearly calcified on the previous study. 2. Atheromatous disease of the aortoiliac vessels and coronary arteries. 3. Prior cholecystectomy. 4. Renal cysts on the right. 5. No acute appearing bowel abnormality. 6. Other nonacute findings, as discussed above. This report was finalized on 10/12/2019 07:42 by Dr. Raz Mendes MD.    Xr Chest 1 View    Result Date: 10/30/2019  Narrative: Exam: XR CHEST 1 VW-  Indication: Shortness of breath  Comparison: 10/27/2019  Findings:  The cardiac silhouette is within normal limits. Left-sided PICC tip overlies the cavoatrial junction. No pleural effusion, pneumothorax, or focal consolidation. Median sternotomy wires. Cervical spine hardware is noted.      Impression:  No acute findings. This report was finalized on 10/30/2019 06:56 by Dr. Santana Figueredo MD.    Xr Chest 1 View    Result Date: 10/27/2019  Narrative: Exam: XR CHEST 1 VW-  Indication: Chest Pain Triage Protocol  Comparison: 10/12/2019  Findings:  Cardiac silhouette is stable. Postoperative change of CABG. Chronic mild right hemidiaphragm elevation. No definite consolidation, large pleural effusion, or visible pneumothorax. Left upper extremity PICC is stable in position. No acute osseous findings.      Impression:  No acute findings. This report was finalized on 10/27/2019 16:41 by Dr. Santana Figueredo MD.    Xr Chest 1 View    Result Date: 10/12/2019  Narrative: EXAMINATION:  XR CHEST 1 VW-  10/12/2019 4:10 AM CDT  HISTORY: Allergic reaction. Coronary artery disease. Prior heart bypass surgery. Diabetes. Hypertension.  COMPARISON: 10/08/2019.  FINDINGS:  There is hypoventilation with  vascular crowding. There is no dense infiltrate. There is bronchial wall thickening. There is borderline cardiomegaly. There is a PICC line catheter on the left. There has been prior heart bypass surgery and cervical fusion.      Impression: 1. Hypoventilation with vascular crowding. 2. Stable bronchial wall thickening. No dense infiltrate or effusion.   This report was finalized on 10/12/2019 07:43 by Dr. Raz Mendes MD.    Xr Chest 1 View    Result Date: 10/8/2019  Narrative: Exam: XR CHEST 1 VW-  Indication: PICC placement  Comparison: 02/07/2019  Findings:  Cardiac silhouette is normal. Postoperative change of CABG. Left-sided PICC tip overlies the cavoatrial junction. No consolidation, pleural effusion, or pneumothorax. No suspicious osseous findings.      Impression:  Left upper extremity PICC tip overlies the cavoatrial junction. This report was finalized on 10/08/2019 12:36 by Dr. Santana Figueredo MD.    Us Venous Doppler Lower Extremity Right (duplex)    Result Date: 10/30/2019  Narrative: History: Right lower extremity swelling      Impression: Impression: There is no evidence of deep venous thrombosis or superficial thrombophlebitis of the right lower extremity.  Comments: Right lower extremity venous duplex exam was performed using color Doppler flow, Doppler wave form analysis, and grayscale imaging, with and without compression. There is no evidence of deep venous thrombosis of the common femoral, superficial femoral, popliteal, posterior tibial, and peroneal veins. There is no thrombus identified in the saphenofemoral junction or the greater saphenous vein.  This report was finalized on 10/30/2019 16:11 by Dr. Talon Santacruz MD.    Ct Angiogram Chest    Result Date: 10/27/2019  Narrative: CT ANGIOGRAM CHEST- 10/27/2019 6:32 PM CDT  HISTORY: Chest pain, acute, PE suspected, intermed prob, positive D-dimer  COMPARISON: 01/18/2018.  DOSE LENGTH PRODUCT: 525 mGy cm. Automated exposure control was also  utilized to decrease patient radiation dose.  TECHNIQUE: Helical tomographic images of the chest were obtained after the administration of intravenous contrast following angiogram protocol. Additionally, 3D and multiplanar reformatted images were provided.    FINDINGS:  Pulmonary arteries: There is adequate enhancement of the pulmonary arteries to evaluate for central and segmental pulmonary emboli. There are no filling defects within the main, lobar, segmental or visualized subsegmental pulmonary arteries. The pulmonary arteries are within normal limits for size.  Aorta and great vessels: The aorta is well opacified and demonstrates no dissection or aneurysm. The great vessels are normal in appearance. Changes of previous CABG are noted.  Visualized neck base: The imaged portion of the base of the neck appears unremarkable.  Lungs: Dependent changes are seen in both lung bases. There is no pulmonary edema or focal consolidation. No worrisome nodules are identified. Small pleural effusions are present bilaterally. The trachea and bronchial tree are patent.  Heart: Mild dilation of the heart is again noted. There is no pericardial effusion.  Mediastinum and lymph nodes: No enlarged mediastinal, hilar, or axillary lymph nodes are present.  Skeletal and soft tissues: The osseous structures of the thorax and surrounding soft tissues demonstrate no acute process.  Upper abdomen: The imaged portion of the upper abdomen demonstrates no acute process.      Impression: 1. Small pleural effusions may be due to volume overload. 2. No evidence of pulmonary embolus or pneumonia.   This report was finalized on 10/27/2019 19:08 by Dr. Sudarshan Starr MD.    Xr Abdomen Kub    Result Date: 10/31/2019  Narrative: EXAMINATION: XR ABDOMEN KUB-  10/31/2019 9:11 PM CDT  HISTORY: severe abdominal pain; N17.9-Acute kidney failure, unspecified; I50.9-Heart failure, unspecified; N39.0-Urinary tract infection, site not specified; Z74.09-Other  reduced mobility  1 view abdomen compared with 01/25/2019.  Nonspecific bowel gas pattern with no sign of obstruction.  No abnormal calcification or unexplained foreign body is seen.  Mild degenerative lumbar spine changes.  Summary: 1. Nonspecific bowel gas pattern. 2. Moderate fecal material is noted within the colon. This report was finalized on 10/31/2019 21:25 by Dr. Gomez Mcgill MD.      LABORATORY/CULTURE RESULTS:  Results from last 7 days   Lab Units 11/05/19 0559 11/04/19 0713 11/03/19 0611   WBC 10*3/mm3 7.36 6.91 7.78   HEMOGLOBIN g/dL 11.2* 11.3* 11.0*   HEMATOCRIT % 34.2* 34.1* 33.3*   PLATELETS 10*3/mm3 263 299 289     Results from last 7 days   Lab Units 11/05/19 0559 11/04/19 0713 11/03/19 0611   SODIUM mmol/L 136 136 136   POTASSIUM mmol/L 5.0 5.1 5.1   CHLORIDE mmol/L 96* 96* 98   CO2 mmol/L 32.0* 31.0* 27.0   BUN mg/dL 33* 33* 40*   CREATININE mg/dL 1.81* 1.73* 1.49*   CALCIUM mg/dL 8.9 9.2 9.1   BILIRUBIN mg/dL 0.4 0.4 0.4   ALK PHOS U/L 69 75 72   ALT (SGPT) U/L 13 15 11   AST (SGOT) U/L 15 16 13   GLUCOSE mg/dL 212* 241* 261*         Microbiology Results (last 10 days)     Procedure Component Value - Date/Time    Blood Culture - Blood, Hand, Right [[id number]] Collected:  10/27/19 1815    Lab Status:  Final result Specimen:  Blood from Hand, Right Updated:  11/01/19 1845     Blood Culture No growth at 5 days    Blood Culture - Blood, Hand, Right [[id number]] Collected:  10/27/19 1655    Lab Status:  Final result Specimen:  Blood from Hand, Right Updated:  11/01/19 1715     Blood Culture No growth at 5 days          PATHOLOGY RESULTS:       ASSESSMENT/PLAN   1.  Diabetic foot ulceration with osteomyelitis, right foot  2.  Diabetes with neuropathy  3.  Peripheral Edema    No debridement performed.    Orders for betadine wet to dry to wound daily.   Limit WB when possible.     Patient started IV abx therapy on October 8 and had intended on 6 weeks of therapy. If having reaction to Vancomycin,  ok with d/c and change to abx with good bone penetration. ID onboard for assistance.     Discussed again potential for surgical offloading/bone resection to assist with wound healing. He says he has been considering this option but isn't agreeable just yet. He will think about it and discuss with his wife.     Will continue to follow peripherally. Upon discharge [facility] and restart of HBO therapy.      This document has been electronically signed by Asad Cerrato DPM on November 5, 2019 2:31 PM           Electronically signed by Asad Cerrato DPM at 11/05/19 [id number]       Consult Notes (last 24 hours) (Notes from 11/05/19 1330 through 11/06/19 1330)     No notes of this type exist for this encounter.

## 2019-11-06 NOTE — DISCHARGE SUMMARY
[facility] Medicine Services  DISCHARGE SUMMARY       Date of Admission: 10/27/2019  Date of Discharge:  11/6/2019  Primary Care Physician: Del Shetty MD    Presenting Problem/History of Present Illness:  WANDER (acute kidney injury) (CMS/[facility]) [N17.9]     Final Discharge Diagnoses:  Active Hospital Problems    Diagnosis   • Morbid obesity with BMI of 40.0-44.9, adult (CMS/[facility])   • WANDER (acute kidney injury) (CMS/[facility])   • Anemia due to chronic kidney disease   • Diabetic foot infection (CMS/[facility])   • Abdominal pain   • Type 2 diabetes mellitus with hyperglycemia, with long-term current use of insulin (CMS/[facility])     hold insulin the am of procedure     • CAD (coronary artery disease)   • Chronic diastolic congestive heart failure (CMS/[facility])   • Diabetes mellitus (CMS/[facility])   1.  WANDER on CKD III  -Cr climbing today, will defer fluids/diuretics to Nephrology     2.  Ostemyelitis of R foot  -IV Clindamycin as per ID     3.  T2DM with hyperglycemia, Neuropathy  -SSI     4.  HTN  -monitor  -Losartan held for WANDER     5.  HLD  -Crestor     6.  GERD  -Protonix     7.  CAD  -ASA  -Statin       Consults: Cardiology, Nephrology, Infectious Disease, Podiatry    Procedures Performed: NA    Pertinent Test Results: NA    Chief Complaint on Day of Discharge:   Constipation    History of Present Illness on Day of Discharge:   Doing ok, still feels constipated, no abdominal pain, no n/v/d    Hospital Course:  The patient is a 64 y.o. male who presented to [facility] with chest pain.  He was found to be volume overloaded and had acute renal failure.  Nephrology was consulted. The patient was diuresed and his creatinine improved.    Cardiology was consulted for his chest pain.  They recommended continuing to diurese, but did not feel that his pain represented ACS or angina.      Infectious disease and podiatry were consulted for his osteomyelitis.  He had a reaction to Vancomycin.   "Infectious Disease changed his antibiotics to Clindamycin.  Podiatry had recommended a 6 week course of IV abx.      Per Dr. Cerrato's note patient started antibiotic therapy on October 8 and plan was for 6-week course. ( nov 18th end date)         Condition on Discharge:  Stable    Physical Exam on Discharge:  /75 (BP Location: Right arm, Patient Position: Lying)   Pulse 77   Temp 98.6 °F (37 °C) (Oral)   Resp 18   Ht 182.9 cm (72\")   Wt (!) 145 kg (319 lb 3.2 oz)   SpO2 98%   BMI 43.29 kg/m²   Physical Exam   Constitutional: He is oriented to person, place, and time. He appears well-developed and well-nourished.   HENT:   Head: Normocephalic and atraumatic.   Right Ear: External ear normal.   Left Ear: External ear normal.   Nose: Nose normal.   Mouth/Throat: Oropharynx is clear and moist.   Eyes: Conjunctivae and EOM are normal. Pupils are equal, round, and reactive to light. Right eye exhibits no discharge. Left eye exhibits no discharge. No scleral icterus.   Neck: Normal range of motion. Neck supple. No tracheal deviation present. No thyromegaly present.   Cardiovascular: Normal rate, regular rhythm, normal heart sounds and intact distal pulses. Exam reveals no gallop and no friction rub.   No murmur heard.  Trace BLE edema   Pulmonary/Chest: Effort normal and breath sounds normal. No stridor. No respiratory distress. He has no wheezes. He has no rales. He exhibits no tenderness.   Abdominal: Soft. Bowel sounds are normal. He exhibits no distension and no mass. There is no tenderness. There is no rebound and no guarding. No hernia.   Musculoskeletal: Normal range of motion. He exhibits no edema or deformity.   Lymphadenopathy:     He has no cervical adenopathy.   Neurological: He is alert and oriented to person, place, and time. He has normal reflexes. He displays normal reflexes. No cranial nerve deficit. He exhibits normal muscle tone. Coordination normal.   Skin: Skin is warm and dry. No rash " noted. No erythema. No pallor.   Psychiatric: He has a normal mood and affect. His behavior is normal. Judgment and thought content normal.   Vitals reviewed.        Discharge Disposition:  Skilled Nursing Facility (DC - External)    Discharge Medications:     Discharge Medications      New Medications      Instructions Start Date   clindamycin 900 MG/50ML IVPB  Commonly known as:  CLEOCIN   900 mg, Intravenous, Every 8 Hours         Changes to Medications      Instructions Start Date   bumetanide 2 MG tablet  Commonly known as:  BUMEX  What changed:  when to take this   2 mg, Oral, 2 Times Daily PRN         Continue These Medications      Instructions Start Date   aspirin 81 MG EC tablet   81 mg, Oral, Daily      CRESTOR 40 MG tablet  Generic drug:  rosuvastatin   40 mg, Oral, Daily      DULoxetine 60 MG capsule  Commonly known as:  CYMBALTA   60 mg, Oral, Daily      gabapentin 100 MG capsule  Commonly known as:  NEURONTIN   100 mg, Oral, 2 Times Daily      insulin glargine 100 UNIT/ML injection  Commonly known as:  LANTUS   20 Units, Subcutaneous, Nightly      insulin regular 500 UNIT/ML CONCENTRATED injection  Commonly known as:  humuLIN R   100 Units, Subcutaneous, 3 Times Daily Before Meals, Packaging states 100 units with regular meals; 120 units with large meals or 360 units daily      lactulose 10 GM/15ML solution  Commonly known as:  CHRONULAC   20 g, Oral, 3 Times Daily PRN      latanoprost 0.005 % ophthalmic solution  Commonly known as:  XALATAN   1 drop, Both Eyes, Nightly      losartan 100 MG tablet  Commonly known as:  COZAAR   100 mg, Oral, Daily      ondansetron ODT 8 MG disintegrating tablet  Commonly known as:  ZOFRAN-ODT   8 mg, Oral, Every 6 Hours PRN      oxyCODONE-acetaminophen  MG per tablet  Commonly known as:  PERCOCET   1 tablet, Oral, 2 Times Daily      pantoprazole 40 MG EC tablet  Commonly known as:  PROTONIX   40 mg, Oral, Daily      saccharomyces boulardii 250 MG  capsule  Commonly known as:  FLORASTOR   250 mg, Oral, 2 Times Daily      traZODone 50 MG tablet  Commonly known as:  DESYREL   25-50 mg, Oral, Nightly PRN             Discharge Diet: Diabetic heart healthy    Activity at Discharge: as tolerated    Discharge Care Plan/Instructions: follow up at [facility]    Follow-up Appointments:   Future Appointments   Date Time Provider Department Center   11/12/2019  8:45 AM  [facility]  [facility]   11/12/2019  9:00 AM CHAIR 14  [facility]  [facility]   11/19/2019  8:45 AM  [facility]  [facility]   11/19/2019  9:00 AM CHAIR 15  [facility]  [facility]       Test Results Pending at Discharge: LIVIA Rollins MD  11/06/19  2:49 PM    Time: 45 minutes

## 2019-11-06 NOTE — PROGRESS NOTES
"INFECTIOUS DISEASES PROGRESS NOTE    Patient:  Erick Luong  YOB: 1955  MRN: [id number]   Admit date: 10/27/2019   Admitting Physician: Willy Rollins MD  Primary Care Physician: Del Shetty MD    Chief Complaint: \"I feel better today\"      Interval History:   Patient reports that today's first day he is felt a little better.  Hard for him to be specific about what felt better.  He does state there is less nausea.    He is still not had another bowel movement since Monday.  He states at home he usually goes daily.    He had a KUB yesterday that was unremarkable    Although he states his abdomen is not hurting now he thinks he \"probably needs an enema\"      Allergies:   Allergies   Allergen Reactions   • Bactrim [Sulfamethoxazole-Trimethoprim] Other (See Comments)     \"RENAL FAILURE\"   • Vancomycin Itching       Current Meds:     Current Facility-Administered Medications:   •  acetaminophen (TYLENOL) tablet 650 mg, 650 mg, Oral, Q4H PRN **OR** acetaminophen (TYLENOL) 160 MG/5ML solution 650 mg, 650 mg, Oral, Q4H PRN **OR** acetaminophen (TYLENOL) suppository 650 mg, 650 mg, Rectal, Q4H PRN, Abebe Garzon DO  •  aspirin EC tablet 81 mg, 81 mg, Oral, Daily, Abebe Garzon DO, 81 mg at 11/05/19 0935  •  bisacodyl (DULCOLAX) suppository 10 mg, 10 mg, Rectal, Daily PRN, Raquel Duncan, APRN, 10 mg at 11/01/19 0101  •  bumetanide (BUMEX) tablet 0.5 mg, 0.5 mg, Oral, Daily, Stewart Alvarez, APRN  •  clindamycin (CLEOCIN) 900 mg in dextrose 5% 50 mL IVPB (premix), 900 mg, Intravenous, Q8H, Julia Adrian MD, Last Rate: 50 mL/hr at 11/06/19 0303, 900 mg at 11/06/19 0303  •  dextrose (D50W) 25 g/ 50mL Intravenous Solution 25 g, 25 g, Intravenous, Q15 Min PRN, Telly Rodriguez MD  •  dextrose (GLUTOSE) oral gel 15 g, 15 g, Oral, Q15 Min PRN, Telly Rodriguez MD  •  diphenhydrAMINE (BENADRYL) injection 25 mg, 25 mg, Intravenous, Q6H PRN, Raquel Duncan APRN, 25 mg at " 10/29/19 2336  •  DULoxetine (CYMBALTA) DR capsule 60 mg, 60 mg, Oral, Daily, Abebe Garzon DO, 60 mg at 11/05/19 0935  •  enoxaparin (LOVENOX) syringe 30 mg, 30 mg, Subcutaneous, Q24H, Telly Rodriguez MD, 30 mg at 11/05/19 2030  •  gabapentin (NEURONTIN) capsule 100 mg, 100 mg, Oral, BID, Abebe Garzon DO, 100 mg at 11/05/19 2019  •  glucagon (human recombinant) (GLUCAGEN DIAGNOSTIC) injection 1 mg, 1 mg, Subcutaneous, Q15 Min PRN, Telly Rodriguez MD  •  Influenza Vac Subunit Quad (FLUCELVAX) injection 0.5 mL, 0.5 mL, Intramuscular, During Hospitalization, Abebe Garzon DO  •  insulin detemir (LEVEMIR) injection 20 Units, 20 Units, Subcutaneous, Q12H, Telly Rodriguez MD, 20 Units at 11/05/19 2018  •  insulin lispro (humaLOG) injection 0-24 Units, 0-24 Units, Subcutaneous, 4x Daily With Meals & Nightly, Telly Rodriguez MD, 24 Units at 11/05/19 2019  •  ipratropium-albuterol (DUO-NEB) nebulizer solution 3 mL, 3 mL, Nebulization, Q6H PRN, Abebe Garzon DO  •  lactulose solution 20 g, 20 g, Oral, BID, Abebe Garzon DO, 20 g at 11/05/19 2019  •  latanoprost (XALATAN) 0.005 % ophthalmic solution 1 drop, 1 drop, Both Eyes, Nightly, Telly Rodriguez MD, 1 drop at 11/05/19 2019  •  magnesium hydroxide (MILK OF MAGNESIA) suspension 2400 mg/10mL 10 mL, 10 mL, Oral, Daily PRN, Raquel Duncan APRN, 10 mL at 11/04/19 0914  •  methylnaltrexone (RELISTOR) injection 8 mg, 8 mg, Subcutaneous, Every Other Day, Abebe Garzon DO, 8 mg at 11/04/19 0945  •  nystatin (MYCOSTATIN) powder, , Topical, Q12H, Raquel Duncan, APRN  •  ondansetron (ZOFRAN) injection 4 mg, 4 mg, Intravenous, Q4H PRN, Willy Rollins MD  •  oxyCODONE-acetaminophen (PERCOCET) 7.5-325 MG per tablet 1 tablet, 1 tablet, Oral, Q4H PRN, Telly Rodriguez MD, 1 tablet at 11/06/19 0334  •  pantoprazole (PROTONIX) EC tablet 40 mg, 40 mg, Oral, Nightly, Telly Rodriguez MD, 40 mg at 11/05/19 2019  •  polyethylene glycol 3350  "powder (packet), 17 g, Oral, Daily, Telly Rodriguez MD, 17 g at 19 0936  •  rosuvastatin (CRESTOR) tablet 40 mg, 40 mg, Oral, Daily, Abebe Garzon DO, 40 mg at 19 0934  •  saccharomyces boulardii (FLORASTOR) capsule 250 mg, 250 mg, Oral, BID, Abebe Garzon DO, 250 mg at 19  •  sodium chloride 0.9 % flush 10 mL, 10 mL, Intravenous, PRN, Reuben Tadeo MD  •  sodium chloride 0.9 % flush 10 mL, 10 mL, Intravenous, Q12H, Abebe Garzon DO, 10 mL at 19  •  sodium chloride 0.9 % flush 10 mL, 10 mL, Intravenous, PRN, Abebe Garzon DO  •  traZODone (DESYREL) tablet 25 mg, 25 mg, Oral, Nightly PRN, Abebe aGrzon DO      Review of Systems   Constitutional: Negative for fever.   Gastrointestinal: Positive for nausea (Improved).       Objective     Vital Signs:  Temp (24hrs), Av.6 °F (36.4 °C), Min:97.5 °F (36.4 °C), Max:97.7 °F (36.5 °C)      /48 (BP Location: Right arm, Patient Position: Lying)   Pulse 74   Temp 97.5 °F (36.4 °C) (Oral)   Resp 18   Ht 182.9 cm (72\")   Wt (!) 145 kg (319 lb 3.2 oz)   SpO2 99%   BMI 43.29 kg/m²         Physical Exam   General: The patient is an obese gentleman lying flat in bed in no acute distress  HEENT: Sclera anicteric and noninjected  Abdomen: Obese, no focal tenderness, no rebound or guarding  Extremities:    Wound dressed.  This was not removed  Psych: He is pleasant and cooperative  Neuro: Alert and oriented, speech is clear.      Line/IV site: PICC upper arm left, condition patent and no redness.      Results Review:    I reviewed the patient's new clinical results.    Lab Results:  CBC:   Lab Results   Lab 10/31/19  0540 19  0736 19  0628 19  0611 19  0713 19  0559 19  0544   WBC 10.53 10.85* 7.77 7.78 6.91 7.36 7.52   HEMOGLOBIN 11.3* 11.2* 11.0* 11.0* 11.3* 11.2* 11.6*   HEMATOCRIT 33.7* 34.1* 33.7* 33.3* 34.1* 34.2* 35.4*   PLATELETS 397 339 307 289 299 263 " 260         CMP:   Lab Results   Lab 11/04/19  0713 11/05/19  0559 11/06/19  0544   SODIUM 136 136 137   POTASSIUM 5.1 5.0 5.4*   CHLORIDE 96* 96* 94*   CO2 31.0* 32.0* 32.0*   BUN 33* 33* 30*   CREATININE 1.73* 1.81* 1.67*   CALCIUM 9.2 8.9 9.0   BILIRUBIN 0.4 0.4 0.4   ALK PHOS 75 69 71   ALT (SGPT) 15 13 13   AST (SGOT) 16 15 13   GLUCOSE 241* 212* 213*         Culture Results:        Microbiology Results Abnormal     Procedure Component Value - Date/Time    Blood Culture - Blood, Hand, Right [[id number]] Collected:  10/27/19 1815    Lab Status:  Final result Specimen:  Blood from Hand, Right Updated:  11/01/19 1845     Blood Culture No growth at 5 days    Blood Culture - Blood, Hand, Right [[id number]] Collected:  10/27/19 1655    Lab Status:  Final result Specimen:  Blood from Hand, Right Updated:  11/01/19 1715     Blood Culture No growth at 5 days          September cultures per Dr. Cerrato  Specimen Information: Foot, Right; Tissue           Tissue Culture     Lab   Light growth (2+) Staphylococcus aureus, MRSA Abnormal    [facility]     Methicillin resistant Staphylococcus aureus, Patient may be an isolation risk.          Gram Stain    Lab   Occasional WBCs seen  [facility]       No organisms seen  [facility]             Susceptibility               Staphylococcus aureus, MRSA       MATT       Clindamycin 0.25 ug/ml Susceptible       Erythromycin 4 ug/ml Intermediate       Inducible Clindamycin Resistance NEG ug/ml Negative       Oxacillin >=4 ug/ml Resistant       Penicillin G >=0.5 ug/ml Resistant       Rifampin <=0.5 ug/ml Susceptible       Tetracycline <=1 ug/ml Susceptible       Trimethoprim + Sulfamethoxazole <=10 ug/ml Susceptible       Vancomycin <=0.5 ug/ml Susceptible                 Susceptibility Comments      Staphylococcus aureus, MRSA   This isolate does not demonstrate inducible clindamycin resistance in vitro.          Specimen Collected: 09/19/19 12:06 Last Resulted: 09/22/19 10:57 Order Details  View Encounter Lab                   Radiology:   Imaging Results (Last 72 Hours)     Procedure Component Value Units Date/Time    XR Abdomen KUB [[id number]] Collected:  11/05/19 1637     Updated:  11/05/19 1640    Narrative:       XR ABDOMEN KUB- 11/5/2019 4:25 PM CST     HISTORY: abd pain; N17.9-Acute kidney failure, unspecified; I50.9-Heart  failure, unspecified; N39.0-Urinary tract infection, site not specified;  Z74.09-Other reduced mobility       COMPARISON: None     FINDINGS:  There is a nonspecific bowel gas pattern. No soft tissue mass or  pathologic calcification  is visualized.     No acute skeletal abnormality is identified. Radiopaque monitoring leads  project over the right abdomen       Impression:       1. Non specific bowel gas pattern..         This report was finalized on 11/05/2019 16:37 by Dr. Eddie Winter MD.          Assessment/Plan     Active Hospital Problems    Diagnosis   • Morbid obesity with BMI of 40.0-44.9, adult (CMS/[facility])   • WANDER (acute kidney injury) (CMS/[facility])   • Anemia due to chronic kidney disease   • Diabetic foot infection (CMS/[facility])   • Abdominal pain   • Type 2 diabetes mellitus with hyperglycemia, with long-term current use of insulin (CMS/[facility])     hold insulin the am of procedure     • CAD (coronary artery disease)   • Chronic diastolic congestive heart failure (CMS/[facility])   • Diabetes mellitus (CMS/[facility])       IMPRESSION:  1. Diabetic foot infection with ulcer and underlying osteomyelitis the right foot-followed by Dr. Cerrato as an outpatient.  Patient had been on intravenous vancomycin for this osteomyelitis since October 8 and plan was for 6-week course of therapy.  He has developed a reaction to vancomycin with itching and shortness of breath and a rapid response was called.  It is now listed as an allergy.  He was switched to IV clindamycin October 30  2. Poorly controlled diabetes-blood glucose in 200-400s  3. Chronic kidney disease stage III with baseline creatinine 1.3  per nephrology-  4. Constipation- less abdominal discomfort today.  5. Nausea-improved        RECOMMENDATION:   Continue clindamycin.  -Plan previously was for stop date around November 18  Ordered enema per patient request as no stool since Monday and that was only after enema given.  Review Dr. Cerrato's note, he discussed potential surgical options for patient again as they are concerned that he may eventually need that.    Per Dr. Cerrato's note patient started antibiotic therapy on October 8 and plan was for 6-week course. ( nov 18th end date)      Julia Adrian MD  11/06/19  8:02 AM

## 2019-11-06 NOTE — THERAPY TREATMENT NOTE
Acute Care - Physical Therapy Treatment Note  [facility]     Patient Name: Erick Luong  : 1955  MRN: [id number]  Today's Date: 2019  Onset of Illness/Injury or Date of Surgery: 10/27/19          Admit Date: 10/27/2019    Visit Dx:    ICD-10-CM ICD-9-CM   1. WANDER (acute kidney injury) (CMS/[facility]) N17.9 584.9   2. Congestive heart failure, unspecified HF chronicity, unspecified heart failure type (CMS/[facility]) I50.9 428.0   3. Urinary tract infection without hematuria, site unspecified N39.0 599.0   4. Decreased mobility and endurance Z74.09 780.99     Patient Active Problem List   Diagnosis   • Lower abdominal pain   • SIRS (systemic inflammatory response syndrome) (CMS/[facility])   • Fever, unknown origin   • Viral gastroenteritis   • Chronic diastolic congestive heart failure (CMS/[facility])   • CAD (coronary artery disease)   • Diabetes mellitus (CMS/[facility])   • Essential hypertension   • Sleep apnea   • Arthritis   • Mixed hyperlipidemia   • Shortness of breath   • Acute pancreatitis   • Chest pain, non-cardiac   • Obesity, unspecified obesity severity, unspecified obesity type   • HTN (hypertension), benign   • Epigastric pain   • Type 2 diabetes mellitus with hyperglycemia, with long-term current use of insulin (CMS/[facility])   • Pleuritic chest pain   • Slow transit constipation   • Nausea and vomiting   • CKD (chronic kidney disease)   • Nonsmoker   • Orthostatic hypotension   • Abdominal pain   • Constipation   • Ischemic colitis (CMS/[facility])   • Altered mental status   • Diabetic foot infection (CMS/[facility])   • WANDER (acute kidney injury) (CMS/[facility])   • Anemia due to chronic kidney disease   • Morbid obesity with BMI of 40.0-44.9, adult (CMS/[facility])   • Infection due to enterococcus   • Osteomyelitis of fifth toe of right foot (CMS/[facility])   • CHF (congestive heart failure) (CMS/[facility])       Therapy Treatment    Rehabilitation Treatment Summary     Row Name 19 1343             Treatment Time/Intention    Discipline  physical  therapy assistant  -JACIEL      Document Type  therapy note (daily note)  -JACIEL      Subjective Information  complains of;fatigue;pain  -JB2      Existing Precautions/Restrictions  fall  -JB2      Treatment Considerations/Comments  MRSA, walking boot for RLE  -JB2      Recorded by [JACIEL] Sabas Maharaj, PTA 11/06/19 1409  [JB2] Sabas Maharaj, PTA 11/06/19 1500      Row Name 11/06/19 1343             Bed Mobility Assessment/Treatment    Comment (Bed Mobility)  up in the chair  -JACIEL      Recorded by [JACIEL] Sabas Maharaj, PTA 11/06/19 1500      Row Name 11/06/19 1343             Sit-Stand Transfer    Sit-Stand Tucker (Transfers)  supervision  -JACIEL      Recorded by [JACIEL] Sabas Maharaj, PTA 11/06/19 1500      Row Name 11/06/19 1343             Gait/Stairs Assessment/Training    38276 - Gait Training Minutes   15  -JACIEL      Tucker Level (Gait)  contact guard  -JACIEL      Distance in Feet (Gait)  60' X 4  -JACIEL      Recorded by [JACIEL] Sabas Maharaj, PTA 11/06/19 1500      Row Name 11/06/19 1343             Positioning and Restraints    Pre-Treatment Position  sitting in chair/recliner  -JACIEL      Post Treatment Position  chair  -JACIEL      In Chair  sitting;call light within reach;encouraged to call for assist;with family/caregiver  -JACIEL      Recorded by [JACIEL] Sabas Maharaj, PTA 11/06/19 1500      Row Name 11/06/19 1343             Pain Scale: Numbers Pre/Post-Treatment    Pain Scale: Numbers, Pretreatment  4/10  -JACIEL      Pain Scale: Numbers, Post-Treatment  4/10  -JACIEL      Pain Location - Side  Right  -JACIEL      Pain Location - Orientation  lower  -JACIEL      Pain Location  foot  -JACIEL      Pain Intervention(s)  Repositioned  -JACIEL      Recorded by [JACIEL] Sabas Maharaj, PTA 11/06/19 1500      Row Name                Wound 07/22/19 2120 Right posterior foot diabetic/neuropathic ulceration    Wound - Properties Group Date first assessed: 07/22/19 [KB] Time first assessed: 2120 [KB] Side: Right [KB] Orientation: posterior  [KB] Location: foot [KB] Type: diabetic/neuropathic ulceration [KB] Recorded by:  [KB] Samantha Israel RN 07/23/19 0008      User Key  (r) = Recorded By, (t) = Taken By, (c) = Cosigned By    Initials Name Effective Dates Discipline    JACIEL Sabas Maharaj EDVIN, PTA 12/08/16 -  PT    Samantha Edwards RN 12/31/18 -  Nurse          Wound 07/22/19 2120 Right posterior foot diabetic/neuropathic ulceration (Active)   Dressing Appearance dry;intact;no drainage 11/6/2019  8:00 AM   Closure WALKER 11/6/2019  8:00 AM   Base dressing in place, unable to visualize 11/6/2019  8:00 AM   Dressing Care, Wound gauze;elastic bandage 11/6/2019  8:00 AM           Physical Therapy Education     Title: PT OT SLP Therapies (Done)     Topic: Physical Therapy (Done)     Point: Mobility training (Done)     Learning Progress Summary           Patient Acceptance, E, VU,DU by OLIVERIO at 11/2/2019  3:04 PM    Comment:  benefits of increased amb    Acceptance, E, VU by AISHWARYA at 10/31/2019  2:18 PM    Comment:  Pt. educated on increasing gait speed/stride length to prevent risk of fall    Acceptance, E, VU by AISHWARYA at 10/30/2019 10:17 AM    Comment:  Pt. educated on importance of maintaining AROM DF R foot needed for gait safety    Acceptance, E, VU by AISHWARYA at 10/29/2019 10:55 AM    Comment:  Pt. educated on importance on ambulating with reduced WB on R foot to  decrease pain    Acceptance, E,D, NR,VU by DE at 10/28/2019  9:46 AM    Comment:  pt educated on safety and proper techinue for functional mobility. pt intructed on proper body mechanics to maximize function and adhere to current safety precautions. pt needs reinforcement with proper use of RW to decrease weight bearing through R foot                   Point: Body mechanics (Done)     Learning Progress Summary           Patient Acceptance, E, VU by AISHWARYA at 10/31/2019  2:18 PM    Comment:  Pt. educated on increasing gait speed/stride length to prevent risk of fall    Acceptance, E, VU by AISHWARYA at 10/30/2019 10:17 AM     Comment:  Pt. educated on importance of maintaining AROM DF R foot needed for gait safety    Acceptance, E, VU by AISHWARYA at 10/29/2019 10:55 AM    Comment:  Pt. educated on importance on ambulating with reduced WB on R foot to  decrease pain    Acceptance, E,D, NR,VU by DE at 10/28/2019  9:46 AM    Comment:  pt educated on safety and proper techinue for functional mobility. pt intructed on proper body mechanics to maximize function and adhere to current safety precautions. pt needs reinforcement with proper use of RW to decrease weight bearing through R foot                   Point: Precautions (Done)     Learning Progress Summary           Patient Acceptance, E, VU by AISHWARYA at 10/31/2019  2:18 PM    Comment:  Pt. educated on increasing gait speed/stride length to prevent risk of fall    Acceptance, E, VU by AISHWARYA at 10/30/2019 10:17 AM    Comment:  Pt. educated on importance of maintaining AROM DF R foot needed for gait safety    Acceptance, E, VU by AISHWARYA at 10/29/2019 10:55 AM    Comment:  Pt. educated on importance on ambulating with reduced WB on R foot to  decrease pain    Acceptance, E,D, NR,VU by DE at 10/28/2019  9:46 AM    Comment:  pt educated on safety and proper techinue for functional mobility. pt intructed on proper body mechanics to maximize function and adhere to current safety precautions. pt needs reinforcement with proper use of RW to decrease weight bearing through R foot                               User Key     Initials Effective Dates Name Provider Type Discipline    AF 02/11/19 -  Faviola Chung PTA Physical Therapy Assistant PT     09/18/19 -  Melly Méndez PTA Student PTA Student PT    DE 10/10/19 -  Sudarshan Hogue PT Student PT Student PT                PT Recommendation and Plan           Time Calculation:   PT Charges     Row Name 11/06/19 1343             Time Calculation    Start Time  1343  -JACIEL      Stop Time  1358  -JACIEL      Time Calculation (min)  15 min  -JACIEL      PT Received On   11/06/19  -JACIEL      PT Goal Re-Cert Due Date  11/07/19  -JACIEL         Time Calculation- PT    Total Timed Code Minutes- PT  15 minute(s)  -JACIEL         Timed Charges    08811 - Gait Training Minutes   15  -JACIEL        User Key  (r) = Recorded By, (t) = Taken By, (c) = Cosigned By    Initials Name Provider Type    Sabas Ervin PTA Physical Therapy Assistant        Therapy Charges for Today     Code Description Service Date Service Provider Modifiers Qty    [id number] HC GAIT TRAINING EA 15 MIN 11/6/2019 Sabas Maharaj PTA GP 1          PT G-Codes  Outcome Measure Options: AM-PAC 6 Clicks Basic Mobility (PT)  AM-PAC 6 Clicks Score (PT): 22  AM-PAC 6 Clicks Score (OT): 20    Sabas Maharaj PTA  11/6/2019

## 2019-11-06 NOTE — PROGRESS NOTES
Nephrology ([facility]) Progress Note      Patient:  Erick Luong  YOB: 1955  Date of Service: 11/6/2019  MRN: [id number]   Acct: [id number]   Primary Care Physician: Del Shetty MD  Advance Directive:   Code Status and Medical Interventions:   Ordered at: 10/27/19 2021     Level Of Support Discussed With:    Patient     Code Status:    CPR     Medical Interventions (Level of Support Prior to Arrest):    Full     Admit Date: 10/27/2019       Hospital Day: 10  Referring Provider: Abebe Garzon DO      Patient personally seen and examined.  Complete chart including Consults, Notes, Operative Reports, Labs, Cardiology, and Radiology studies reviewed as able.        Subjective:  Erick Luong is a 64 y.o. male  whom we were consulted for acute kidney injury.  Baseline chronic kidney disease stage 3, baseline creatinine 1.3.  Has followed with our office on an irregular basis in the past.  History of type 2 diabetes, hypertension, congestive heart failure.  Undergoing treatment for chronic right foot wound; has PICC line in place and has been getting IV Vancomycin.  Last few days has developed increased edema, dyspnea. Unable to lay flat at night.  Diminished urine output.  Hospital course remarkable for initiation of Bumex drip with excellent response. Transitioned to intermittent IV dose Bumex on 10/29.  Night of 10/29 had apparent allergic reaction to Vancomycin; required a rapid response call.  Has been maintaining good output with intermittent Bumex    Today is actually feeling better.  Edema improving, no nausea or abdominal pain today. Urine output nonoliguric    Allergies:  Bactrim [sulfamethoxazole-trimethoprim] and Vancomycin    Home Meds:  Medications Prior to Admission   Medication Sig Dispense Refill Last Dose   • aspirin 81 MG EC tablet Take 81 mg by mouth Daily.   10/27/2019 at Unknown time   • bumetanide (BUMEX) 2 MG tablet Take 1 tablet by mouth 2  (Two) Times a Day As Needed (edema). (Patient taking differently: Take 2 mg by mouth 2 (Two) Times a Day.) 30 tablet 2 10/27/2019 at Unknown time   • DULoxetine (CYMBALTA) 60 MG capsule Take 60 mg by mouth Daily.   10/27/2019 at Unknown time   • gabapentin (NEURONTIN) 100 MG capsule Take 100 mg by mouth 2 (Two) Times a Day.   10/27/2019 at Unknown time   • insulin glargine (LANTUS) 100 UNIT/ML injection Inject 20 Units under the skin into the appropriate area as directed Every Night.   10/27/2019 at Unknown time   • insulin regular (humuLIN R) 500 UNIT/ML CONCENTRATED injection Inject 100 Units under the skin 3 (Three) Times a Day Before Meals. Packaging states 100 units with regular meals; 120 units with large meals or 360 units daily   10/27/2019 at Unknown time   • lactulose (CHRONULAC) 10 GM/15ML solution Take 20 g by mouth 3 (Three) Times a Day As Needed.   10/27/2019 at Unknown time   • latanoprost (XALATAN) 0.005 % ophthalmic solution Administer 1 drop to both eyes Every Night.   10/27/2019 at Unknown time   • losartan (COZAAR) 100 MG tablet Take 100 mg by mouth Daily.   10/27/2019 at Unknown time   • ondansetron ODT (ZOFRAN-ODT) 8 MG disintegrating tablet Take 8 mg by mouth Every 6 (Six) Hours As Needed for Nausea or Vomiting.   10/27/2019 at Unknown time   • oxyCODONE-acetaminophen (PERCOCET)  MG per tablet Take 1 tablet by mouth 2 (Two) Times a Day.   10/27/2019 at Unknown time   • pantoprazole (PROTONIX) 40 MG EC tablet Take 1 tablet by mouth Daily. 90 tablet 3 10/27/2019 at Unknown time   • rosuvastatin (CRESTOR) 40 MG tablet Take 40 mg by mouth Daily.   10/27/2019 at Unknown time   • saccharomyces boulardii (FLORASTOR) 250 MG capsule Take 250 mg by mouth 2 (Two) Times a Day.   10/27/2019 at Unknown time   • traZODone (DESYREL) 50 MG tablet Take 25-50 mg by mouth At Night As Needed for Sleep.   10/26/2019 at Unknown time       Medicines:  Current Facility-Administered Medications   Medication Dose  Route Frequency Provider Last Rate Last Dose   • acetaminophen (TYLENOL) tablet 650 mg  650 mg Oral Q4H PRN Abebe Garzon DO        Or   • acetaminophen (TYLENOL) 160 MG/5ML solution 650 mg  650 mg Oral Q4H PRN Abebe Garzon DO        Or   • acetaminophen (TYLENOL) suppository 650 mg  650 mg Rectal Q4H PRN Abebe Garzon DO       • aspirin EC tablet 81 mg  81 mg Oral Daily Abebe Garzon DO   81 mg at 11/06/19 0832   • bisacodyl (DULCOLAX) suppository 10 mg  10 mg Rectal Daily PRN Raquel Duncan, APRN   10 mg at 11/01/19 0101   • bumetanide (BUMEX) tablet 0.5 mg  0.5 mg Oral Daily Stewart Alvarez, APRN   0.5 mg at 11/06/19 0832   • clindamycin (CLEOCIN) 900 mg in dextrose 5% 50 mL IVPB (premix)  900 mg Intravenous Q8H Julia Adrian MD 50 mL/hr at 11/06/19 1042 900 mg at 11/06/19 1042   • dextrose (D50W) 25 g/ 50mL Intravenous Solution 25 g  25 g Intravenous Q15 Min PRN Telly Rodriguez MD       • dextrose (GLUTOSE) oral gel 15 g  15 g Oral Q15 Min PRN Telly Rodriguez MD       • diphenhydrAMINE (BENADRYL) injection 25 mg  25 mg Intravenous Q6H PRN Raquel Duncan, APRN   25 mg at 10/29/19 2336   • DULoxetine (CYMBALTA) DR capsule 60 mg  60 mg Oral Daily Abebe Garzon DO   60 mg at 11/06/19 0832   • enoxaparin (LOVENOX) syringe 30 mg  30 mg Subcutaneous Q24H Telly Rodriguez MD   30 mg at 11/05/19 2030   • gabapentin (NEURONTIN) capsule 100 mg  100 mg Oral BID Abebe Garzon DO   100 mg at 11/06/19 0831   • glucagon (human recombinant) (GLUCAGEN DIAGNOSTIC) injection 1 mg  1 mg Subcutaneous Q15 Min PRN Telly Rodriguez MD       • Influenza Vac Subunit Quad (FLUCELVAX) injection 0.5 mL  0.5 mL Intramuscular During Hospitalization Abebe Garzon, DO       • insulin detemir (LEVEMIR) injection 20 Units  20 Units Subcutaneous Q12H Telly Rodriguez MD   20 Units at 11/06/19 0837   • insulin lispro (humaLOG) injection 0-24 Units  0-24 Units Subcutaneous 4x Daily With Meals  & Nightly Telly Rodriguez MD   8 Units at 11/06/19 0836   • ipratropium-albuterol (DUO-NEB) nebulizer solution 3 mL  3 mL Nebulization Q6H PRN Abebe Garzon DO       • lactulose solution 20 g  20 g Oral BID Abebe Garzon DO   20 g at 11/06/19 0832   • latanoprost (XALATAN) 0.005 % ophthalmic solution 1 drop  1 drop Both Eyes Nightly Telly Rodriguez MD   1 drop at 11/05/19 2019   • magnesium hydroxide (MILK OF MAGNESIA) suspension 2400 mg/10mL 10 mL  10 mL Oral Daily PRN Raquel Duncan, APRN   10 mL at 11/04/19 0914   • methylnaltrexone (RELISTOR) injection 8 mg  8 mg Subcutaneous Every Other Day Abebe Garzon DO   8 mg at 11/06/19 0832   • nystatin (MYCOSTATIN) powder   Topical Q12H Raquel Duncan, APRN       • ondansetron (ZOFRAN) injection 4 mg  4 mg Intravenous Q4H PRN Willy Rollins MD       • oxyCODONE-acetaminophen (PERCOCET) 7.5-325 MG per tablet 1 tablet  1 tablet Oral Q4H PRN Telly Rodriguez MD   1 tablet at 11/06/19 0839   • pantoprazole (PROTONIX) EC tablet 40 mg  40 mg Oral Nightly Telly Rodriguez MD   40 mg at 11/05/19 2019   • polyethylene glycol 3350 powder (packet)  17 g Oral Daily Telly Rodriguez MD   17 g at 11/06/19 0832   • rosuvastatin (CRESTOR) tablet 40 mg  40 mg Oral Daily Abebe Garzon DO   40 mg at 11/06/19 0832   • saccharomyces boulardii (FLORASTOR) capsule 250 mg  250 mg Oral BID Abebe Garzon DO   250 mg at 11/06/19 0831   • sodium chloride 0.9 % flush 10 mL  10 mL Intravenous PRN Reuben Tadeo MD       • sodium chloride 0.9 % flush 10 mL  10 mL Intravenous Q12H Abebe Garzon DO   10 mL at 11/06/19 0832   • sodium chloride 0.9 % flush 10 mL  10 mL Intravenous PRN Abebe Garzon DO       • traZODone (DESYREL) tablet 25 mg  25 mg Oral Nightly PRN Abebe Garzon DO           Past Medical History:  Past Medical History:   Diagnosis Date   • Arthritis    • CHF (congestive heart failure) (CMS/[facility])    • Coronary artery  disease    • Diabetes mellitus (CMS/HCC)    • Hyperlipidemia    • Hypertension    • Myocardial infarction (CMS/HCC)    • Pancreatitis    • Renal disorder    • Sleep apnea with use of continuous positive airway pressure (CPAP)        Past Surgical History:  Past Surgical History:   Procedure Laterality Date   • ABDOMINAL SURGERY     • APPENDECTOMY     • BACK SURGERY     • CARDIAC SURGERY     • CATARACT EXTRACTION     • CERVICAL SPINE SURGERY     • COLONOSCOPY N/A 1/31/2017    Normal exam repeat in 5 years   • COLONOSCOPY N/A 2/11/2019    Procedure: COLONOSCOPY WITH ANESTHESIA;  Surgeon: Tyrell Smith MD;  Location: [facility];  Service: Gastroenterology   • COLONOSCOPY W/ POLYPECTOMY  03/04/2014    Hyperplastic polyp   • CORONARY ARTERY BYPASS GRAFT  10/2015   • ENDOSCOPY  04/13/2011    Gastritis with hemorrhage   • ENDOSCOPY N/A 5/5/2017    Normal exam   • ENDOSCOPY N/A 2/11/2019    Procedure: ESOPHAGOGASTRODUODENOSCOPY WITH ANESTHESIA;  Surgeon: Tyrell Smith MD;  Location: [facility];  Service: Gastroenterology   • INCISION AND DRAINAGE OF WOUND Left 09/2007    spider bite       Family History  Family History   Problem Relation Age of Onset   • Colon cancer Father    • Heart disease Father    • Colon cancer Sister    • Colon polyps Sister    • Alzheimer's disease Mother    • Coronary artery disease Sister    • Coronary artery disease Sister        Social History  Social History     Socioeconomic History   • Marital status:      Spouse name: Not on file   • Number of children: Not on file   • Years of education: Not on file   • Highest education level: Not on file   Tobacco Use   • Smoking status: Never Smoker   • Smokeless tobacco: Never Used   • Tobacco comment: smoked in highschool   Substance and Sexual Activity   • Alcohol use: No   • Drug use: No   • Sexual activity: Defer         Review of Systems:  History obtained from chart review and the patient  General ROS: Patient  complains of fatigue and No fever or chills  Respiratory ROS: No cough, shortness of breath, wheezing  Cardiovascular ROS: positive for - dyspnea on exertion and edema  No chest pain or palpitations  Gastrointestinal ROS: No abdominal pain or melena  Genito-Urinary ROS: No dysuria or hematuria  Neurological ROS: no headache or dizziness    Objective:  Patient Vitals for the past 24 hrs:   BP Temp Temp src Pulse Resp SpO2 Weight   11/06/19 0635 -- -- -- -- -- -- (!) 145 kg (319 lb 3.2 oz)   11/05/19 2315 123/48 97.5 °F (36.4 °C) Oral 74 18 99 % --       Intake/Output Summary (Last 24 hours) at 11/6/2019 1046  Last data filed at 11/6/2019 0755  Gross per 24 hour   Intake 92.86 ml   Output 2900 ml   Net -2807.14 ml     General: awake/alert    Neck: supple, no JVD  Chest:  clear to auscultation bilaterally without respiratory distress  CVS: regular rate and rhythm  Abdominal: rounded and soft, nontender, positive bowel sounds  Extremities: trace lower extremity edema  Skin: warm and dry without rash   Neuro: no focal motor deficits      Labs:  Results from last 7 days   Lab Units 11/06/19  0544 11/05/19 0559 11/04/19 0713   WBC 10*3/mm3 7.52 7.36 6.91   HEMOGLOBIN g/dL 11.6* 11.2* 11.3*   HEMATOCRIT % 35.4* 34.2* 34.1*   PLATELETS 10*3/mm3 260 263 299         Results from last 7 days   Lab Units 11/06/19 0544 11/05/19  0559 11/04/19 0713   SODIUM mmol/L 137 136 136   POTASSIUM mmol/L 5.4* 5.0 5.1   CHLORIDE mmol/L 94* 96* 96*   CO2 mmol/L 32.0* 32.0* 31.0*   BUN mg/dL 30* 33* 33*   CREATININE mg/dL 1.67* 1.81* 1.73*   CALCIUM mg/dL 9.0 8.9 9.2   BILIRUBIN mg/dL 0.4 0.4 0.4   ALK PHOS U/L 71 69 75   ALT (SGPT) U/L 13 13 15   AST (SGOT) U/L 13 15 16   GLUCOSE mg/dL 213* 212* 241*       Radiology:   Imaging Results (last 72 hours)     Procedure Component Value Units Date/Time    CT Angiogram Chest [[id number]] Collected:  10/27/19 1906     Updated:  10/27/19 1911    Narrative:       CT ANGIOGRAM CHEST- 10/27/2019 6:32  PM CDT      HISTORY: Chest pain, acute, PE suspected, intermed prob, positive  D-dimer      COMPARISON: 01/18/2018.      DOSE LENGTH PRODUCT: 525 mGy cm. Automated exposure control was also  utilized to decrease patient radiation dose.     TECHNIQUE: Helical tomographic images of the chest were obtained after  the administration of intravenous contrast following angiogram protocol.  Additionally, 3D and multiplanar reformatted images were provided.        FINDINGS:    Pulmonary arteries: There is adequate enhancement of the pulmonary  arteries to evaluate for central and segmental pulmonary emboli. There  are no filling defects within the main, lobar, segmental or visualized  subsegmental pulmonary arteries. The pulmonary arteries are within  normal limits for size.      Aorta and great vessels: The aorta is well opacified and demonstrates no  dissection or aneurysm. The great vessels are normal in appearance.  Changes of previous CABG are noted.     Visualized neck base: The imaged portion of the base of the neck appears  unremarkable.      Lungs: Dependent changes are seen in both lung bases. There is no  pulmonary edema or focal consolidation. No worrisome nodules are  identified. Small pleural effusions are present bilaterally. The trachea  and bronchial tree are patent.      Heart: Mild dilation of the heart is again noted. There is no  pericardial effusion.      Mediastinum and lymph nodes: No enlarged mediastinal, hilar, or axillary  lymph nodes are present.      Skeletal and soft tissues: The osseous structures of the thorax and  surrounding soft tissues demonstrate no acute process.     Upper abdomen: The imaged portion of the upper abdomen demonstrates no  acute process.        Impression:       1. Small pleural effusions may be due to volume overload.  2. No evidence of pulmonary embolus or pneumonia.        This report was finalized on 10/27/2019 19:08 by Dr. Sudarshan Starr MD.    CT Abdomen Pelvis With  Contrast [[id number]] Collected:  10/27/19 1903     Updated:  10/27/19 1908    Narrative:       CT ABDOMEN PELVIS W CONTRAST- 10/27/2019 6:32 PM CDT     HISTORY: Abdominal pain, unspecified       COMPARISON: 10/12/2019.      DOSE LENGTH PRODUCT: 1919 mGy cm. Automated exposure control was also  utilized to decrease patient radiation dose.     TECHNIQUE: Following the intravenous administration of contrast, helical  CT tomographic images of the abdomen and pelvis were acquired.  Multiplanar reformatted images were provided for review.      FINDINGS:   LOWER CHEST: Findings in the chest will be described in a separate  dictation.      LIVER: No focal liver lesion. The hepatic vasculature is patent.      BILIARY SYSTEM: The gallbladder has been removed. There is no evidence  of biliary ductal dilation.      PANCREAS: There is mild atrophy in the pancreas.      SPLEEN: Unremarkable.      KIDNEYS: Simple cysts are seen in the RIGHT kidney. There has been no  significant interval change. There is no hydronephrosis. The LEFT kidney  is unremarkable. The ureters are decompressed and normal in appearance.     ADRENALS: Unremarkable.     RETROPERITONEUM: No mass, lymphadenopathy or hemorrhage.      GI TRACT: No evidence of obstruction or bowel wall thickening. The  appendix has been removed.     OTHER: There is no mesenteric mass, lymphadenopathy or fluid collection.  The abdominopelvic vasculature is patent. The osseous structures and  soft tissues demonstrate no worrisome lesions. Mild subcutaneous edema  is again noted in the lower abdominal wall.      PELVIS: No mass lesion, fluid collection or significant lymphadenopathy  is seen in the pelvis. The urinary bladder is normal in appearance.       Impression:       1. No evidence of acute abdominopelvic process.   2. Subcutaneous edema in the anterior abdomen and in the dependent  portions of the hips and buttocks. Findings could be due to volume  overload.        This  report was finalized on 10/27/2019 19:05 by Dr. Sudarshan Starr MD.    XR Chest 1 View [[id number]] Collected:  10/27/19 1640     Updated:  10/27/19 1644    Narrative:       Exam: XR CHEST 1 VW-     Indication: Chest Pain Triage Protocol     Comparison: 10/12/2019     Findings:     Cardiac silhouette is stable. Postoperative change of CABG. Chronic mild  right hemidiaphragm elevation. No definite consolidation, large pleural  effusion, or visible pneumothorax. Left upper extremity PICC is stable  in position. No acute osseous findings.       Impression:          No acute findings.  This report was finalized on 10/27/2019 16:41 by Dr. Santana Figueredo MD.          Culture:  Blood Culture   Date Value Ref Range Status   10/27/2019 No growth at 24 hours  Preliminary   10/27/2019 No growth at 24 hours  Preliminary         Assessment   1.  Acute kidney injury due to ATN--improving  2.  Baseline chronic kidney disease stage 3  3.  Type 2 diabetes with nephropathy  4.  Essential hypertension  5.  Right foot wound  6.  Volume overload--resolving  7.  Anemia   8.  Hyperkalemia     Plan:  1.  Continue current dose Bumex  2.  Kayexalate 15 gm PO once now  3.  Monitor labs    Stewart Alvarez, SUSAN  11/6/2019  10:46 AM

## 2019-11-06 NOTE — TELEPHONE ENCOUNTER
Per Dr. Cerrato to call [facility] and tell them to continue same treatment for Lt[facility]. I s/w Martha on [facility]. She voiced understanding instructions given.

## 2019-11-06 NOTE — PROGRESS NOTES
Continued Stay Note   Yamil     Patient Name: Erick Luong  MRN: [id number]  Today's Date: 11/6/2019    Admit Date: 10/27/2019    Discharge Plan     Row Name 11/06/19 1518       Plan    Plan  Continue Care    Patient/Family in Agreement with Plan  yes    Final Discharge Disposition Code  [facility] - [facility]    Final Note  Insurance has approved [facility] for pt. He will d/c to there today.        Discharge Codes    No documentation.       Expected Discharge Date and Time     Expected Discharge Date Expected Discharge Time    Nov 6, 2019             MEJIA Carreno

## 2019-11-06 NOTE — PLAN OF CARE
Problem: Patient Care Overview  Goal: Plan of Care Review  Outcome: Ongoing (interventions implemented as appropriate)   11/06/19 0131   Coping/Psychosocial   Plan of Care Reviewed With patient   Plan of Care Review   Progress improving   OTHER   Outcome Summary Pt medicated for pain x1. No c/o nausea. Pt resting between care. Accucheck ACHS, SS and Levemir. Voiding. VSS, will cont to monitor.      Goal: Discharge Needs Assessment  Outcome: Ongoing (interventions implemented as appropriate)   10/27/19 2024 10/29/19 1509 11/04/19 0508   Discharge Needs Assessment   Readmission Within the Last 30 Days --  --  no previous admission in last 30 days   Concerns to be Addressed --  adjustment to diagnosis/illness;care coordination/care conferences;discharge planning --    Patient/Family Anticipates Transition to --  home with help/services;home with family --    Patient/Family Anticipated Services at Transition --  home health care --    Transportation Anticipated --  family or friend will provide --    Outpatient/Agency/Support Group Needs --  skilled nursing facility --    Discharge Facility/Level of Care Needs --  nursing facility, skilled --    Discharge Coordination/Progress --  Pt lives at home with his spouse. He currently gets iv abx through [facility] and is followed by [facility]. He plans to return home at d/c with these services. Will follow. --    Disability   Equipment Currently Used at Home orthotic device --  --        Problem: Pain, Chronic (Adult)  Goal: Acceptable Pain/Comfort Level and Functional Ability  Outcome: Ongoing (interventions implemented as appropriate)   11/06/19 0131   Pain, Chronic (Adult)   Acceptable Pain/Comfort Level and Functional Ability making progress toward outcome       Problem: Fall Risk (Adult)  Goal: Absence of Fall  Outcome: Ongoing (interventions implemented as appropriate)   11/06/19 0131   Fall Risk (Adult)   Absence of Fall making progress toward outcome       Problem:  Renal Failure/Kidney Injury, Acute (Adult)  Goal: Signs and Symptoms of Listed Potential Problems Will be Absent, Minimized or Managed (Renal Failure/Kidney Injury, Acute)   11/06/19 0131   Goal/Outcome Evaluation   Problems Assessed (Acute Renal Failure/Kidney Injury) all   Problems Present (ARF/Kidney Injury) situational response       Problem: Wound (Includes Pressure Injury) (Adult)  Goal: Signs and Symptoms of Listed Potential Problems Will be Absent, Minimized or Managed (Wound)  Outcome: Ongoing (interventions implemented as appropriate)   11/06/19 0131   Goal/Outcome Evaluation   Problems Assessed (Wound) all   Problems Present (Wound) delayed wound healing;infection;pain;situational response

## 2019-11-07 LAB
ALBUMIN SERPL-MCNC: 3.7 G/DL (ref 3.5–5.2)
ALBUMIN/GLOB SERPL: 1.2 G/DL
ALP SERPL-CCNC: 72 U/L (ref 39–117)
ALT SERPL W P-5'-P-CCNC: 13 U/L (ref 1–41)
ANION GAP SERPL CALCULATED.3IONS-SCNC: 10 MMOL/L (ref 5–15)
AST SERPL-CCNC: 13 U/L (ref 1–40)
BASOPHILS # BLD AUTO: 0.05 10*3/MM3 (ref 0–0.2)
BASOPHILS NFR BLD AUTO: 0.7 % (ref 0–1.5)
BILIRUB SERPL-MCNC: 0.4 MG/DL (ref 0.2–1.2)
BUN BLD-MCNC: 29 MG/DL (ref 8–23)
BUN/CREAT SERPL: 17.3 (ref 7–25)
CALCIUM SPEC-SCNC: 8.8 MG/DL (ref 8.6–10.5)
CHLORIDE SERPL-SCNC: 95 MMOL/L (ref 98–107)
CO2 SERPL-SCNC: 28 MMOL/L (ref 22–29)
CREAT BLD-MCNC: 1.68 MG/DL (ref 0.76–1.27)
DEPRECATED RDW RBC AUTO: 41.9 FL (ref 37–54)
EOSINOPHIL # BLD AUTO: 0.53 10*3/MM3 (ref 0–0.4)
EOSINOPHIL NFR BLD AUTO: 7.7 % (ref 0.3–6.2)
ERYTHROCYTE [DISTWIDTH] IN BLOOD BY AUTOMATED COUNT: 13.6 % (ref 12.3–15.4)
GFR SERPL CREATININE-BSD FRML MDRD: 41 ML/MIN/1.73
GLOBULIN UR ELPH-MCNC: 3 GM/DL
GLUCOSE BLD-MCNC: 251 MG/DL (ref 65–99)
GLUCOSE BLDC GLUCOMTR-MCNC: 224 MG/DL (ref 70–130)
GLUCOSE BLDC GLUCOMTR-MCNC: 230 MG/DL (ref 70–130)
GLUCOSE BLDC GLUCOMTR-MCNC: 311 MG/DL (ref 70–130)
GLUCOSE BLDC GLUCOMTR-MCNC: 332 MG/DL (ref 70–130)
GLUCOSE BLDC GLUCOMTR-MCNC: 382 MG/DL (ref 70–130)
GLUCOSE BLDC GLUCOMTR-MCNC: 415 MG/DL (ref 70–130)
GLUCOSE BLDC GLUCOMTR-MCNC: 459 MG/DL (ref 70–130)
HCT VFR BLD AUTO: 33.9 % (ref 37.5–51)
HGB BLD-MCNC: 11.5 G/DL (ref 13–17.7)
IMM GRANULOCYTES # BLD AUTO: 0.03 10*3/MM3 (ref 0–0.05)
IMM GRANULOCYTES NFR BLD AUTO: 0.4 % (ref 0–0.5)
LYMPHOCYTES # BLD AUTO: 1.31 10*3/MM3 (ref 0.7–3.1)
LYMPHOCYTES NFR BLD AUTO: 19 % (ref 19.6–45.3)
MCH RBC QN AUTO: 28.6 PG (ref 26.6–33)
MCHC RBC AUTO-ENTMCNC: 33.9 G/DL (ref 31.5–35.7)
MCV RBC AUTO: 84.3 FL (ref 79–97)
MONOCYTES # BLD AUTO: 0.91 10*3/MM3 (ref 0.1–0.9)
MONOCYTES NFR BLD AUTO: 13.2 % (ref 5–12)
NEUTROPHILS # BLD AUTO: 4.07 10*3/MM3 (ref 1.7–7)
NEUTROPHILS NFR BLD AUTO: 59 % (ref 42.7–76)
NRBC BLD AUTO-RTO: 0 /100 WBC (ref 0–0.2)
PLATELET # BLD AUTO: 210 10*3/MM3 (ref 140–450)
PMV BLD AUTO: 11.2 FL (ref 6–12)
POTASSIUM BLD-SCNC: 4.7 MMOL/L (ref 3.5–5.2)
PREALB SERPL-MCNC: 20.6 MG/DL (ref 20–40)
PROT SERPL-MCNC: 6.7 G/DL (ref 6–8.5)
RBC # BLD AUTO: 4.02 10*6/MM3 (ref 4.14–5.8)
SODIUM BLD-SCNC: 133 MMOL/L (ref 136–145)
WBC NRBC COR # BLD: 6.9 10*3/MM3 (ref 3.4–10.8)

## 2019-11-07 PROCEDURE — 63710000001 INSULIN DETEMIR PER 5 UNITS: Performed by: INTERNAL MEDICINE

## 2019-11-07 PROCEDURE — G0009 ADMIN PNEUMOCOCCAL VACCINE: HCPCS | Performed by: INTERNAL MEDICINE

## 2019-11-07 PROCEDURE — 25010000002 INFLUENZA VAC SUBUNIT QUAD 0.5 ML SUSPENSION PREFILLED SYRINGE: Performed by: INTERNAL MEDICINE

## 2019-11-07 PROCEDURE — 82962 GLUCOSE BLOOD TEST: CPT

## 2019-11-07 PROCEDURE — 97165 OT EVAL LOW COMPLEX 30 MIN: CPT | Performed by: OCCUPATIONAL THERAPIST

## 2019-11-07 PROCEDURE — 80053 COMPREHEN METABOLIC PANEL: CPT | Performed by: INTERNAL MEDICINE

## 2019-11-07 PROCEDURE — 97535 SELF CARE MNGMENT TRAINING: CPT

## 2019-11-07 PROCEDURE — 97161 PT EVAL LOW COMPLEX 20 MIN: CPT | Performed by: PHYSICAL THERAPIST

## 2019-11-07 PROCEDURE — 90670 PCV13 VACCINE IM: CPT | Performed by: INTERNAL MEDICINE

## 2019-11-07 PROCEDURE — 90674 CCIIV4 VAC NO PRSV 0.5 ML IM: CPT | Performed by: INTERNAL MEDICINE

## 2019-11-07 PROCEDURE — 25010000002 PNEUMOCOCCAL CONJ. 13-VALENT SUSPENSION: Performed by: INTERNAL MEDICINE

## 2019-11-07 PROCEDURE — 25010000002 ENOXAPARIN PER 10 MG: Performed by: INTERNAL MEDICINE

## 2019-11-07 PROCEDURE — 84134 ASSAY OF PREALBUMIN: CPT | Performed by: INTERNAL MEDICINE

## 2019-11-07 PROCEDURE — G0008 ADMIN INFLUENZA VIRUS VAC: HCPCS | Performed by: INTERNAL MEDICINE

## 2019-11-07 PROCEDURE — 85025 COMPLETE CBC W/AUTO DIFF WBC: CPT | Performed by: INTERNAL MEDICINE

## 2019-11-07 PROCEDURE — 63710000001 INSULIN LISPRO (HUMAN) PER 5 UNITS: Performed by: INTERNAL MEDICINE

## 2019-11-07 RX ORDER — DOCUSATE SODIUM 100 MG/1
100 CAPSULE, LIQUID FILLED ORAL 2 TIMES DAILY
Status: DISCONTINUED | OUTPATIENT
Start: 2019-11-07 | End: 2019-11-19 | Stop reason: HOSPADM

## 2019-11-07 NOTE — CONSULTS
Nephrology ([facility]) Consult Note      Patient:  Erick Luong  YOB: 1955  Date of Service: 11/7/2019  MRN: [id number]   Acct: [id number]   Primary Care Physician: Del Shetty MD  Advance Directive:   There are no questions and answers to display.     Admit Date: 11/6/2019       Hospital Day: 0  Referring Provider: Juan Manuel Page MD      Patient personally seen and examined.  Complete chart including Consults, Notes, Operative Reports, Labs, Cardiology, and Radiology studies reviewed as able.        Subjective:  Erick Luong is a 64 y.o. male  whom we were consulted for acute kidney injury.  Baseline chronic kidney disease stage 3, baseline creatinine 1.3.  Has followed with our office on an irregular basis in the past.  History of type 2 diabetes, hypertension, congestive heart failure.  Undergoing treatment for chronic right foot wound; has PICC line in place and has been getting IV Vancomycin. He had an acute hospital stay at [facility] after he developed increasing edema, dyspnea and diminished urine output.  Hospital course remarkable for initiation of Bumex drip; good output since it was started. He had WANDER/ CKD stage 3 and hyperkalemia. He was stabilized and now transferred to LTAC for rehab. Renals service was called to monitor his electrolytes and follow his CKD stage 3. Patient has no dysuria.     Allergies:  Bactrim [sulfamethoxazole-trimethoprim] and Vancomycin    Home Meds:  Medications Prior to Admission   Medication Sig Dispense Refill Last Dose   • aspirin 81 MG EC tablet Take 81 mg by mouth Daily.   10/27/2019 at Unknown time   • bumetanide (BUMEX) 2 MG tablet Take 1 tablet by mouth 2 (Two) Times a Day As Needed (edema). (Patient taking differently: Take 2 mg by mouth 2 (Two) Times a Day.) 30 tablet 2 10/27/2019 at Unknown time   • clindamycin (CLEOCIN) 900 MG/50ML IVPB Infuse 50 mL into a venous catheter Every 8 (Eight) Hours for 35 doses. 1750  mL 0    • DULoxetine (CYMBALTA) 60 MG capsule Take 60 mg by mouth Daily.   10/27/2019 at Unknown time   • gabapentin (NEURONTIN) 100 MG capsule Take 100 mg by mouth 2 (Two) Times a Day.   10/27/2019 at Unknown time   • insulin glargine (LANTUS) 100 UNIT/ML injection Inject 20 Units under the skin into the appropriate area as directed Every Night.   10/27/2019 at Unknown time   • insulin regular (humuLIN R) 500 UNIT/ML CONCENTRATED injection Inject 100 Units under the skin 3 (Three) Times a Day Before Meals. Packaging states 100 units with regular meals; 120 units with large meals or 360 units daily   10/27/2019 at Unknown time   • lactulose (CHRONULAC) 10 GM/15ML solution Take 20 g by mouth 3 (Three) Times a Day As Needed.   10/27/2019 at Unknown time   • latanoprost (XALATAN) 0.005 % ophthalmic solution Administer 1 drop to both eyes Every Night.   10/27/2019 at Unknown time   • losartan (COZAAR) 100 MG tablet Take 100 mg by mouth Daily.   10/27/2019 at Unknown time   • ondansetron ODT (ZOFRAN-ODT) 8 MG disintegrating tablet Take 8 mg by mouth Every 6 (Six) Hours As Needed for Nausea or Vomiting.   10/27/2019 at Unknown time   • oxyCODONE-acetaminophen (PERCOCET)  MG per tablet Take 1 tablet by mouth 2 (Two) Times a Day.   10/27/2019 at Unknown time   • pantoprazole (PROTONIX) 40 MG EC tablet Take 1 tablet by mouth Daily. 90 tablet 3 10/27/2019 at Unknown time   • rosuvastatin (CRESTOR) 40 MG tablet Take 40 mg by mouth Daily.   10/27/2019 at Unknown time   • saccharomyces boulardii (FLORASTOR) 250 MG capsule Take 250 mg by mouth 2 (Two) Times a Day.   10/27/2019 at Unknown time   • traZODone (DESYREL) 50 MG tablet Take 25-50 mg by mouth At Night As Needed for Sleep.   10/26/2019 at Unknown time       Medicines:  Current Facility-Administered Medications   Medication Dose Route Frequency Provider Last Rate Last Dose   • acetaminophen (TYLENOL) tablet 650 mg  650 mg Oral Q4H PRN Juan Manuel Page MD         Or   • acetaminophen (TYLENOL) 160 MG/5ML solution 650 mg  650 mg Oral Q4H PRN Juan Manuel Page MD        Or   • acetaminophen (TYLENOL) suppository 650 mg  650 mg Rectal Q4H PRN Juan Manuel Page MD       • aspirin EC tablet 81 mg  81 mg Oral Daily Juan Manuel Page MD       • bisacodyl (DULCOLAX) suppository 10 mg  10 mg Rectal Daily PRN Juan Manuel Page MD       • bumetanide (BUMEX) tablet 0.5 mg  0.5 mg Oral Daily Juan Manuel Page MD       • clindamycin (CLEOCIN) 900 mg in dextrose 5% 50 mL IVPB (premix)  900 mg Intravenous Q8H Juan Manuel Page MD       • dextrose (D50W) 25 g/ 50mL Intravenous Solution 25 g  25 g Intravenous Q15 Min PRN Juan Manuel Page MD       • dextrose (GLUTOSE) oral gel 15 g  15 g Oral Q15 Min PRN Juan Manuel Page MD       • diphenhydrAMINE (BENADRYL) injection 25 mg  25 mg Intravenous Q6H PRN Juan Manuel Page MD       • docusate sodium (COLACE) capsule 100 mg  100 mg Oral BID Juan Manuel Page MD       • DULoxetine (CYMBALTA) DR capsule 60 mg  60 mg Oral Daily Juan Manuel Page MD       • enoxaparin (LOVENOX) syringe 30 mg  30 mg Subcutaneous Q24H Juan Manuel Page MD       • gabapentin (NEURONTIN) capsule 100 mg  100 mg Oral BID Juan Manuel Page MD       • glucagon (human recombinant) (GLUCAGEN DIAGNOSTIC) injection 1 mg  1 mg Subcutaneous Q15 Min PRN Juan Manuel Page MD       • insulin detemir (LEVEMIR) injection 10 Units  10 Units Subcutaneous Once Juan Manuel Page MD       • insulin detemir (LEVEMIR) injection 30 Units  30 Units Subcutaneous Q12H Juan Manuel Page MD       • insulin lispro (humaLOG) injection 0-24 Units  0-24 Units Subcutaneous 4x Daily With Meals & Nightly Juan Manuel Page MD       • ipratropium-albuterol (DUO-NEB) nebulizer solution 3 mL  3 mL Nebulization Q6H PRN Juan Manuel Page MD       • lactulose solution 20 g  20 g Oral BID Juan Manuel Page MD       •  latanoprost (XALATAN) 0.005 % ophthalmic solution 1 drop  1 drop Both Eyes Nightly Juan Manuel Page MD       • magnesium hydroxide (MILK OF MAGNESIA) suspension 2400 mg/10mL 10 mL  10 mL Oral Daily PRN Juan Manuel Page MD       • [START ON 11/8/2019] methylnaltrexone (RELISTOR) injection 8 mg  8 mg Subcutaneous Every Other Day Juan Manuel Page MD       • nystatin (MYCOSTATIN) powder   Topical Q12H Juan Manuel Page MD       • ondansetron (ZOFRAN) injection 4 mg  4 mg Intravenous Q4H PRN Juan Manuel Page MD       • oxyCODONE-acetaminophen (PERCOCET) 7.5-325 MG per tablet 1 tablet  1 tablet Oral Q4H PRN Juan Manuel Page MD       • pantoprazole (PROTONIX) EC tablet 40 mg  40 mg Oral Nightly Juan Manuel Page MD       • pneumococcal conj. 13-valent (PREVNAR-13) vaccine 0.5 mL  0.5 mL Intramuscular Once Juan Manuel Page MD       • polyethylene glycol 3350 powder (packet)  17 g Oral BID Juan Manuel Page MD       • rosuvastatin (CRESTOR) tablet 40 mg  40 mg Oral Nightly Juan Manuel Page MD       • saccharomyces boulardii (FLORASTOR) capsule 250 mg  250 mg Oral BID Juan Manuel Page MD       • sodium chloride 0.9 % flush 10 mL  10 mL Intravenous Q12H Juan Manuel Page MD       • sodium chloride 0.9 % flush 10 mL  10 mL Intravenous PRN Juan Manuel Page MD       • traZODone (DESYREL) tablet 25 mg  25 mg Oral Nightly PRN Juan Manuel Page MD           Past Medical History:  Past Medical History:   Diagnosis Date   • Arthritis    • CHF (congestive heart failure) (CMS/HCC)    • Coronary artery disease    • Diabetes mellitus (CMS/HCC)    • Hyperlipidemia    • Hypertension    • Myocardial infarction (CMS/HCC)    • Pancreatitis    • Renal disorder    • Sleep apnea with use of continuous positive airway pressure (CPAP)        Past Surgical History:  Past Surgical History:   Procedure Laterality Date   • ABDOMINAL SURGERY     • APPENDECTOMY     • BACK  SURGERY     • CARDIAC SURGERY     • CATARACT EXTRACTION     • CERVICAL SPINE SURGERY     • COLONOSCOPY N/A 1/31/2017    Normal exam repeat in 5 years   • COLONOSCOPY N/A 2/11/2019    Procedure: COLONOSCOPY WITH ANESTHESIA;  Surgeon: Tyrell Smith MD;  Location: [facility] ENDOSCOPY;  Service: Gastroenterology   • COLONOSCOPY W/ POLYPECTOMY  03/04/2014    Hyperplastic polyp   • CORONARY ARTERY BYPASS GRAFT  10/2015   • ENDOSCOPY  04/13/2011    Gastritis with hemorrhage   • ENDOSCOPY N/A 5/5/2017    Normal exam   • ENDOSCOPY N/A 2/11/2019    Procedure: ESOPHAGOGASTRODUODENOSCOPY WITH ANESTHESIA;  Surgeon: Tyrell Smith MD;  Location: [facility] ENDOSCOPY;  Service: Gastroenterology   • INCISION AND DRAINAGE OF WOUND Left 09/2007    spider bite       Family History  Family History   Problem Relation Age of Onset   • Colon cancer Father    • Heart disease Father    • Colon cancer Sister    • Colon polyps Sister    • Alzheimer's disease Mother    • Coronary artery disease Sister    • Coronary artery disease Sister        Social History  Social History     Socioeconomic History   • Marital status:      Spouse name: Not on file   • Number of children: Not on file   • Years of education: Not on file   • Highest education level: Not on file   Tobacco Use   • Smoking status: Never Smoker   • Smokeless tobacco: Never Used   • Tobacco comment: smoked in highschool   Substance and Sexual Activity   • Alcohol use: No   • Drug use: No   • Sexual activity: Defer         Review of Systems:  History obtained from chart review and the patient  General ROS: No fever or chills  Respiratory ROS: No cough, shortness of breath, wheezing  Cardiovascular ROS: positive for - dyspnea on exertion and edema  No chest pain or palpitations  Gastrointestinal ROS: No abdominal pain or melena  Genito-Urinary ROS: No dysuria or hematuria  Neurological ROS: no headache or dizziness   14 point ROS reviewed with the patient and negative except  as noted above and in the HPI unless unable to obtain.    Objective:  BP: 165/61   HR 76  Temp 98  General: awake/alert    Head: atraumatic, normocephalic  Neck: supple, no JVD  Chest:  clear to auscultation bilaterally without respiratory distress  CVS: regular rate and rhythm  Abdominal: obese and soft, nontender, positive bowel sounds  Extremities: trace leg edema  Skin: warm and dry without rash  Neuro: no focal motor deficits    Labs:  Results from last 7 days   Lab Units 11/07/19 0457 11/06/19 0544 11/05/19  0559   WBC 10*3/mm3 6.90 7.52 7.36   HEMOGLOBIN g/dL 11.5* 11.6* 11.2*   HEMATOCRIT % 33.9* 35.4* 34.2*   PLATELETS 10*3/mm3 210 260 263         Results from last 7 days   Lab Units 11/07/19 0457 11/06/19 0544 11/05/19  0559   SODIUM mmol/L 133* 137 136   POTASSIUM mmol/L 4.7 5.4* 5.0   CHLORIDE mmol/L 95* 94* 96*   CO2 mmol/L 28.0 32.0* 32.0*   BUN mg/dL 29* 30* 33*   CREATININE mg/dL 1.68* 1.67* 1.81*   CALCIUM mg/dL 8.8 9.0 8.9   BILIRUBIN mg/dL 0.4 0.4 0.4   ALK PHOS U/L 72 71 69   ALT (SGPT) U/L 13 13 13   AST (SGOT) U/L 13 13 15   GLUCOSE mg/dL 251* 213* 212*       Radiology:   Imaging Results (last 72 hours)     Procedure Component Value Units Date/Time    CT Angiogram Chest [[id number]] Collected:  10/27/19 1906     Updated:  10/27/19 1911    Narrative:       CT ANGIOGRAM CHEST- 10/27/2019 6:32 PM CDT      HISTORY: Chest pain, acute, PE suspected, intermed prob, positive  D-dimer      COMPARISON: 01/18/2018.      DOSE LENGTH PRODUCT: 525 mGy cm. Automated exposure control was also  utilized to decrease patient radiation dose.     TECHNIQUE: Helical tomographic images of the chest were obtained after  the administration of intravenous contrast following angiogram protocol.  Additionally, 3D and multiplanar reformatted images were provided.        FINDINGS:    Pulmonary arteries: There is adequate enhancement of the pulmonary  arteries to evaluate for central and segmental pulmonary emboli.  There  are no filling defects within the main, lobar, segmental or visualized  subsegmental pulmonary arteries. The pulmonary arteries are within  normal limits for size.      Aorta and great vessels: The aorta is well opacified and demonstrates no  dissection or aneurysm. The great vessels are normal in appearance.  Changes of previous CABG are noted.     Visualized neck base: The imaged portion of the base of the neck appears  unremarkable.      Lungs: Dependent changes are seen in both lung bases. There is no  pulmonary edema or focal consolidation. No worrisome nodules are  identified. Small pleural effusions are present bilaterally. The trachea  and bronchial tree are patent.      Heart: Mild dilation of the heart is again noted. There is no  pericardial effusion.      Mediastinum and lymph nodes: No enlarged mediastinal, hilar, or axillary  lymph nodes are present.      Skeletal and soft tissues: The osseous structures of the thorax and  surrounding soft tissues demonstrate no acute process.     Upper abdomen: The imaged portion of the upper abdomen demonstrates no  acute process.        Impression:       1. Small pleural effusions may be due to volume overload.  2. No evidence of pulmonary embolus or pneumonia.        This report was finalized on 10/27/2019 19:08 by Dr. Sudarshan Starr MD.    CT Abdomen Pelvis With Contrast [[id number]] Collected:  10/27/19 1903     Updated:  10/27/19 1908    Narrative:       CT ABDOMEN PELVIS W CONTRAST- 10/27/2019 6:32 PM CDT     HISTORY: Abdominal pain, unspecified       COMPARISON: 10/12/2019.      DOSE LENGTH PRODUCT: 1919 mGy cm. Automated exposure control was also  utilized to decrease patient radiation dose.     TECHNIQUE: Following the intravenous administration of contrast, helical  CT tomographic images of the abdomen and pelvis were acquired.  Multiplanar reformatted images were provided for review.      FINDINGS:   LOWER CHEST: Findings in the chest will be  described in a separate  dictation.      LIVER: No focal liver lesion. The hepatic vasculature is patent.      BILIARY SYSTEM: The gallbladder has been removed. There is no evidence  of biliary ductal dilation.      PANCREAS: There is mild atrophy in the pancreas.      SPLEEN: Unremarkable.      KIDNEYS: Simple cysts are seen in the RIGHT kidney. There has been no  significant interval change. There is no hydronephrosis. The LEFT kidney  is unremarkable. The ureters are decompressed and normal in appearance.     ADRENALS: Unremarkable.     RETROPERITONEUM: No mass, lymphadenopathy or hemorrhage.      GI TRACT: No evidence of obstruction or bowel wall thickening. The  appendix has been removed.     OTHER: There is no mesenteric mass, lymphadenopathy or fluid collection.  The abdominopelvic vasculature is patent. The osseous structures and  soft tissues demonstrate no worrisome lesions. Mild subcutaneous edema  is again noted in the lower abdominal wall.      PELVIS: No mass lesion, fluid collection or significant lymphadenopathy  is seen in the pelvis. The urinary bladder is normal in appearance.       Impression:       1. No evidence of acute abdominopelvic process.   2. Subcutaneous edema in the anterior abdomen and in the dependent  portions of the hips and buttocks. Findings could be due to volume  overload.        This report was finalized on 10/27/2019 19:05 by Dr. Sudarshan Starr MD.    XR Chest 1 View [[id number]] Collected:  10/27/19 1640     Updated:  10/27/19 1644    Narrative:       Exam: XR CHEST 1 VW-     Indication: Chest Pain Triage Protocol     Comparison: 10/12/2019     Findings:     Cardiac silhouette is stable. Postoperative change of CABG. Chronic mild  right hemidiaphragm elevation. No definite consolidation, large pleural  effusion, or visible pneumothorax. Left upper extremity PICC is stable  in position. No acute osseous findings.       Impression:          No acute findings.  This report was  finalized on 10/27/2019 16:41 by Dr. Santana Figueredo MD.          Culture:         Assessment   1.  Acute kidney injury due to ATN--resolving  2.  Baseline chronic kidney disease stage 3  3.  Type 2 diabetes with nephropathy  4.  Essential hypertension  5.  Right foot wound  6.  Volume overload--improving  7.  Anemia   8.  Hyperkalemia    Plan:  1.  Continue oral diuretics  2.  Monitor labs  3.  Provide low K diet      Thank you for the consult, we appreciate the opportunity to provide care to your patients.  Feel free to contact me if I can be of any further assistance.      Vinny Hartley MD  11/7/2019  4:41 PM

## 2019-11-07 NOTE — PROGRESS NOTES
ACH Fall Assessment Note    Erick Luong is a 64 y.o.male  [Ht:  ; Wt:  ] admitted 11/6/2019  4:54 PM.    Current medications associated with an increased risk for fall include:    Bumetanide  Diphenhydramine  Duloxetine  Gabapentin  Insulin detemir  Insulin lispro  Oxycodone/ apap  Polyethylene glycol  Trazodone     Reuben Esposito, PharmD  11/06/196:01 PM

## 2019-11-07 NOTE — THERAPY DISCHARGE NOTE
Acute Care - Physical Therapy Discharge Summary  [facility]       Patient Name: Erick Luong  : 1955  MRN: [id number]    Today's Date: 2019  Onset of Illness/Injury or Date of Surgery: 10/27/19              Admit Date: 10/27/2019      PT Recommendation and Plan    Visit Dx:    ICD-10-CM ICD-9-CM   1. WANDER (acute kidney injury) (CMS/[facility]) N17.9 584.9   2. Congestive heart failure, unspecified HF chronicity, unspecified heart failure type (CMS/[facility]) I50.9 428.0   3. Urinary tract infection without hematuria, site unspecified N39.0 599.0   4. Decreased mobility and endurance Z74.09 780.99           PT Charges     Row Name 19 1508             Time Calculation    PT Received On  19  -SB        User Key  (r) = Recorded By, (t) = Taken By, (c) = Cosigned By    Initials Name Provider Type    Amanda Baumann, PT DPT Physical Therapist          Rehab Goal Summary     Row Name 19 1621             Transfer Goal 1 (PT)    Activity/Assistive Device (Transfer Goal 1, PT)  transfers, all  -      [place] Level/Cues Needed (Transfer Goal 1, PT)  independent  -      Time Frame (Transfer Goal 1, PT)  by discharge  -      Progress/Outcome (Transfer Goal 1, PT)  goal met  -         Gait Training Goal 1 (PT)    Activity/Assistive Device (Gait Training Goal 1, PT)  gait (walking locomotion);assistive device use  -      [place] Level (Gait Training Goal 1, PT)  conditional independence  -      Distance (Gait Goal 1, PT)  100 ft, using RW correctly to decrease weight bearing through R foot.  -AH      Time Frame (Gait Training Goal 1, PT)  by discharge  -      Progress/Outcome (Gait Training Goal 1, PT)  goal not met  -         Stairs Goal 1 (PT)    Activity/Assistive Device (Stairs Goal 1, PT)  stairs, all skills  -      [place] Level/Cues Needed (Stairs Goal 1, PT)  independent  -      Number of Stairs (Stairs Goal 1, PT)  2 stairs without use of rails to simulate stairs at  home   -      Time Frame (Stairs Goal 1, PT)  by discharge  -      Progress/Outcome (Stairs Goal 1, PT)  goal not met  -         Patient Education Goal (PT)    Activity (Patient Education Goal, PT)  pt correctly use RW to decrease weight bearing through R foot at all times while up on feet for ambulation and transfers.  -      [place]/Cues/Accuracy (Memory Goal 2, PT)  demonstrates adequately  -      Time Frame (Patient Education Goal, PT)  by discharge  -      Progress/Outcome (Patient Education Goal, PT)  goal met  -        User Key  (r) = Recorded By, (t) = Taken By, (c) = Cosigned By    Initials Name Provider Type Discipline    Chery Cedeno, PTA Physical Therapy Assistant PT              PT Discharge Summary  Reason for Discharge: Discharge from facility  Outcomes Achieved: Refer to plan of care for updates on goals achieved  Discharge Destination: [facility]      Chery Rosado PTA   11/7/2019

## 2019-11-07 NOTE — PAYOR COMM NOTE
"DC TO [facility] 11-6-19  [id number]    Erick Luong (64 y.o. Male)     Date of Birth Social Security Number Address Home Phone MRN    1955  [id number]  [place] [address] [phone] [id number]    Mu-ism Marital Status          Congregational        Admission Date Admission Type Admitting Provider Attending Provider Department, Room/Bed    10/27/19 Emergency Willy Rollins MD  [facility] 3C, 379/1    Discharge Date Discharge Disposition Discharge Destination        11/6/2019 Skilled Nursing Facility (DC - External)              Attending Provider:  (none)   Allergies:  Bactrim [Sulfamethoxazole-trimethoprim], Vancomycin    Isolation:  Contact   Infection:  MRSA (05/19/19)   Code Status:  Prior    Ht:  182.9 cm (72\")   Wt:  145 kg (319 lb 3.2 oz)    Admission Cmt:  None   Principal Problem:  None                Active Insurance as of 10/27/2019     Primary Coverage     Payor Plan Insurance Group Employer/Plan Group    [facility] [employer] [facility] [employer] EMPLOYEE [id number]     Payor Plan Address Payor Plan Phone Number Payor Plan Fax Number Effective Dates    [address] [phone]  1/1/2018 - None Entered    [facility] [id number]       Subscriber Name Subscriber Birth Date Member ID       ZAINAB LUONG 11/27/1969 [id number]           Secondary Coverage     Payor Plan Insurance Group Employer/Plan Group    MEDICARE MEDICARE A & B      Payor Plan Address Payor Plan Phone Number Payor Plan Fax Number Effective Dates    [address] [phone]  7/1/2013 - None Entered    [facility] [address]       Subscriber Name Subscriber Birth Date Member ID       ERICK LUONG 1955 [id number]                 Emergency Contacts      (Rel.) Home Phone Work Phone Mobile Phone    Zainab Luong (Spouse) [phone]               Discharge Summary      Willy Rollins MD at 11/06/19 1438              [facility] " Hospital Medicine Services  DISCHARGE SUMMARY       Date of Admission: 10/27/2019  Date of Discharge:  11/6/2019  Primary Care Physician: Del Shetty MD    Presenting Problem/History of Present Illness:  WANDER (acute kidney injury) (CMS/[facility]) [N17.9]     Final Discharge Diagnoses:  Active Hospital Problems    Diagnosis   • Morbid obesity with BMI of 40.0-44.9, adult (CMS/[facility])   • WANDER (acute kidney injury) (CMS/[facility])   • Anemia due to chronic kidney disease   • Diabetic foot infection (CMS/[facility])   • Abdominal pain   • Type 2 diabetes mellitus with hyperglycemia, with long-term current use of insulin (CMS/[facility])     hold insulin the am of procedure     • CAD (coronary artery disease)   • Chronic diastolic congestive heart failure (CMS/HCC)   • Diabetes mellitus (CMS/HCC)   1.  WANDER on CKD III  -Cr climbing today, will defer fluids/diuretics to Nephrology     2.  Ostemyelitis of R foot  -IV Clindamycin as per ID     3.  T2DM with hyperglycemia, Neuropathy  -SSI     4.  HTN  -monitor  -Losartan held for WANDER     5.  HLD  -Crestor     6.  GERD  -Protonix     7.  CAD  -ASA  -Statin       Consults: Cardiology, Nephrology, Infectious Disease, Podiatry    Procedures Performed: NA    Pertinent Test Results: NA    Chief Complaint on Day of Discharge:   Constipation    History of Present Illness on Day of Discharge:   Doing ok, still feels constipated, no abdominal pain, no n/v/d    Hospital Course:  The patient is a 64 y.o. male who presented to [facility] with chest pain.  He was found to be volume overloaded and had acute renal failure.  Nephrology was consulted. The patient was diuresed and his creatinine improved.    Cardiology was consulted for his chest pain.  They recommended continuing to diurese, but did not feel that his pain represented ACS or angina.      Infectious disease and podiatry were consulted for his osteomyelitis.  He had a reaction to Vancomycin.  Infectious Disease changed his antibiotics to  "Clindamycin.  Podiatry had recommended a 6 week course of IV abx.      Per Dr. Cerrato's note patient started antibiotic therapy on October 8 and plan was for 6-week course. ( nov 18th end date)         Condition on Discharge:  Stable    Physical Exam on Discharge:  /75 (BP Location: Right arm, Patient Position: Lying)   Pulse 77   Temp 98.6 °F (37 °C) (Oral)   Resp 18   Ht 182.9 cm (72\")   Wt (!) 145 kg (319 lb 3.2 oz)   SpO2 98%   BMI 43.29 kg/m²    Physical Exam   Constitutional: He is oriented to person, place, and time. He appears well-developed and well-nourished.   HENT:   Head: Normocephalic and atraumatic.   Right Ear: External ear normal.   Left Ear: External ear normal.   Nose: Nose normal.   Mouth/Throat: Oropharynx is clear and moist.   Eyes: Conjunctivae and EOM are normal. Pupils are equal, round, and reactive to light. Right eye exhibits no discharge. Left eye exhibits no discharge. No scleral icterus.   Neck: Normal range of motion. Neck supple. No tracheal deviation present. No thyromegaly present.   Cardiovascular: Normal rate, regular rhythm, normal heart sounds and intact distal pulses. Exam reveals no gallop and no friction rub.   No murmur heard.  Trace BLE edema   Pulmonary/Chest: Effort normal and breath sounds normal. No stridor. No respiratory distress. He has no wheezes. He has no rales. He exhibits no tenderness.   Abdominal: Soft. Bowel sounds are normal. He exhibits no distension and no mass. There is no tenderness. There is no rebound and no guarding. No hernia.   Musculoskeletal: Normal range of motion. He exhibits no edema or deformity.   Lymphadenopathy:     He has no cervical adenopathy.   Neurological: He is alert and oriented to person, place, and time. He has normal reflexes. He displays normal reflexes. No cranial nerve deficit. He exhibits normal muscle tone. Coordination normal.   Skin: Skin is warm and dry. No rash noted. No erythema. No pallor.   Psychiatric: He " has a normal mood and affect. His behavior is normal. Judgment and thought content normal.   Vitals reviewed.        Discharge Disposition:  Skilled Nursing Facility (DC - External)    Discharge Medications:     Discharge Medications      New Medications      Instructions Start Date   clindamycin 900 MG/50ML IVPB  Commonly known as:  CLEOCIN   900 mg, Intravenous, Every 8 Hours         Changes to Medications      Instructions Start Date   bumetanide 2 MG tablet  Commonly known as:  BUMEX  What changed:  when to take this   2 mg, Oral, 2 Times Daily PRN         Continue These Medications      Instructions Start Date   aspirin 81 MG EC tablet   81 mg, Oral, Daily      CRESTOR 40 MG tablet  Generic drug:  rosuvastatin   40 mg, Oral, Daily      DULoxetine 60 MG capsule  Commonly known as:  CYMBALTA   60 mg, Oral, Daily      gabapentin 100 MG capsule  Commonly known as:  NEURONTIN   100 mg, Oral, 2 Times Daily      insulin glargine 100 UNIT/ML injection  Commonly known as:  LANTUS   20 Units, Subcutaneous, Nightly      insulin regular 500 UNIT/ML CONCENTRATED injection  Commonly known as:  humuLIN R   100 Units, Subcutaneous, 3 Times Daily Before Meals, Packaging states 100 units with regular meals; 120 units with large meals or 360 units daily      lactulose 10 GM/15ML solution  Commonly known as:  CHRONULAC   20 g, Oral, 3 Times Daily PRN      latanoprost 0.005 % ophthalmic solution  Commonly known as:  XALATAN   1 drop, Both Eyes, Nightly      losartan 100 MG tablet  Commonly known as:  COZAAR   100 mg, Oral, Daily      ondansetron ODT 8 MG disintegrating tablet  Commonly known as:  ZOFRAN-ODT   8 mg, Oral, Every 6 Hours PRN      oxyCODONE-acetaminophen  MG per tablet  Commonly known as:  PERCOCET   1 tablet, Oral, 2 Times Daily      pantoprazole 40 MG EC tablet  Commonly known as:  PROTONIX   40 mg, Oral, Daily      saccharomyces boulardii 250 MG capsule  Commonly known as:  FLORASTOR   250 mg, Oral, 2 Times  Daily      traZODone 50 MG tablet  Commonly known as:  DESYREL   25-50 mg, Oral, Nightly PRN             Discharge Diet: Diabetic heart healthy    Activity at Discharge: as tolerated    Discharge Care Plan/Instructions: follow up at [facility]    Follow-up Appointments:   Future Appointments   Date Time Provider Department Center   11/12/2019  8:45 AM  [facility]  [facility]   11/12/2019  9:00 AM CHAIR 14  [facility]  [facility]   11/19/2019  8:45 AM  [facility]  [facility]   11/19/2019  9:00 AM CHAIR 15  [facility]  [facility]       Test Results Pending at Discharge: NA    Willy Rollins MD  11/06/19  2:49 PM    Time: 45 minutes          Electronically signed by Willy Rollins MD at 11/06/19 2931

## 2019-11-07 NOTE — H&P
[facility]  NIMESH Cortes M.D.  SUSAN Haney APRN        Internal Medicine History and Physical      Name: Erick Luong  MRN: [id number]     Acct: [id number]  Room: [facility]    Admit Date: 11/6/2019  PCP: Del Shetty MD    Chief Complaint:     Need for continued wound care, IV antibiotics and rehabilitation    History Obtained From:     chart review and the patient    History of Present Illness:      Erick Luong is a  64 y.o.  male who presents with need for continued wound care, IV antibiotics and rehabilitation following recent acute care stay. The patient has a chronic right lower extremity wound followed as an outpatient by Dr. Cerrato at the wound care clinic at [facility]. He had been receiving IV vancomycin at home (with a start date of 10/8 with plan for 6 weeks of antibiotics) and undergoing hyperbaric therapy to the wound. He developed worsening dyspnea and orthopnea with associated chest pain and presented to ER for evaluation and treatment. The patient also reported constipation despite the use of laxatives at home. He was found to have evidence of acute kidney injury with acute volume overload. He was placed on IV vancomycin and Iv Rocephin as well as a bumex drip and admitted to the service of the hospitalists. He responded well to diuresis. Cardiac workup remained negative. The patient continued wound care with recommendations for limited weight bearing to the RLE. The patient had a possible allergic reaction to vancomycin and a rapid response was called. He was treated with antihistamines and steroids with improvement in his symptoms. Vancomycin was discontinued. ID was consulted for antibiotic management. Rocephin was discontinued and the patient was placed on clindamycin. Venous doppler showed no evidence of DVT or SVT. Renal function returned to baseline with intermittent diuresis. Due to his need for continued antibiotics  through 11/18 under direction of ID and wound care, he transferred to our facility.     Past Medical History:     Past Medical History:   Diagnosis Date   • Arthritis    • CHF (congestive heart failure) (CMS/HCC)    • Coronary artery disease    • Diabetes mellitus (CMS/HCC)    • Hyperlipidemia    • Hypertension    • Myocardial infarction (CMS/HCC)    • Pancreatitis    • Renal disorder    • Sleep apnea with use of continuous positive airway pressure (CPAP)         Past Surgical History:     Past Surgical History:   Procedure Laterality Date   • ABDOMINAL SURGERY     • APPENDECTOMY     • BACK SURGERY     • CARDIAC SURGERY     • CATARACT EXTRACTION     • CERVICAL SPINE SURGERY     • COLONOSCOPY N/A 1/31/2017    Normal exam repeat in 5 years   • COLONOSCOPY N/A 2/11/2019    Procedure: COLONOSCOPY WITH ANESTHESIA;  Surgeon: Tyrell Smith MD;  Location: [facility] ENDOSCOPY;  Service: Gastroenterology   • COLONOSCOPY W/ POLYPECTOMY  03/04/2014    Hyperplastic polyp   • CORONARY ARTERY BYPASS GRAFT  10/2015   • ENDOSCOPY  04/13/2011    Gastritis with hemorrhage   • ENDOSCOPY N/A 5/5/2017    Normal exam   • ENDOSCOPY N/A 2/11/2019    Procedure: ESOPHAGOGASTRODUODENOSCOPY WITH ANESTHESIA;  Surgeon: Tyrell Smith MD;  Location: [facility] ENDOSCOPY;  Service: Gastroenterology   • INCISION AND DRAINAGE OF WOUND Left 09/2007    spider bite        Medications Prior to Admission:       Prior to Admission medications    Medication Sig Start Date End Date Taking? Authorizing Provider   aspirin 81 MG EC tablet Take 81 mg by mouth Daily.    Provider, MD Bossman   bumetanide (BUMEX) 2 MG tablet Take 1 tablet by mouth 2 (Two) Times a Day As Needed (edema).  Patient taking differently: Take 2 mg by mouth 2 (Two) Times a Day. 3/6/19   Telly Rodriguez MD   clindamycin (CLEOCIN) 900 MG/50ML IVPB Infuse 50 mL into a venous catheter Every 8 (Eight) Hours for 35 doses. 11/6/19 11/18/19  Willy Rollins MD   DULoxetine (CYMBALTA)  60 MG capsule Take 60 mg by mouth Daily.    Bossman Blount MD   gabapentin (NEURONTIN) 100 MG capsule Take 100 mg by mouth 2 (Two) Times a Day.    Bossman Blount MD   insulin glargine (LANTUS) 100 UNIT/ML injection Inject 20 Units under the skin into the appropriate area as directed Every Night.    Bossman Blount MD   insulin regular (humuLIN R) 500 UNIT/ML CONCENTRATED injection Inject 100 Units under the skin 3 (Three) Times a Day Before Meals. Packaging states 100 units with regular meals; 120 units with large meals or 360 units daily    Bossman Blount MD   lactulose (CHRONULAC) 10 GM/15ML solution Take 20 g by mouth 3 (Three) Times a Day As Needed.    Bossman Blount MD   latanoprost (XALATAN) 0.005 % ophthalmic solution Administer 1 drop to both eyes Every Night.    Bossman Blount MD   losartan (COZAAR) 100 MG tablet Take 100 mg by mouth Daily.    Bossman Blount MD   ondansetron ODT (ZOFRAN-ODT) 8 MG disintegrating tablet Take 8 mg by mouth Every 6 (Six) Hours As Needed for Nausea or Vomiting.    Bossman Blount MD   oxyCODONE-acetaminophen (PERCOCET)  MG per tablet Take 1 tablet by mouth 2 (Two) Times a Day.    Bossman Blount MD   pantoprazole (PROTONIX) 40 MG EC tablet Take 1 tablet by mouth Daily. 9/19/17   Emeka Garay MD   rosuvastatin (CRESTOR) 40 MG tablet Take 40 mg by mouth Daily.    Bossman Blount MD   saccharomyces boulardii (FLORASTOR) 250 MG capsule Take 250 mg by mouth 2 (Two) Times a Day.    Bossman Blount MD   traZODone (DESYREL) 50 MG tablet Take 25-50 mg by mouth At Night As Needed for Sleep.    Bossman Blount MD        Allergies:       Bactrim [sulfamethoxazole-trimethoprim] and Vancomycin    Social History:     Tobacco:    reports that he has never smoked. He has never used smokeless tobacco.  Alcohol:      reports that he does not drink alcohol.  Drug Use:  reports that he does not use  "drugs.    Family History:     Family History   Problem Relation Age of Onset   • Colon cancer Father    • Heart disease Father    • Colon cancer Sister    • Colon polyps Sister    • Alzheimer's disease Mother    • Coronary artery disease Sister    • Coronary artery disease Sister        Review of Systems:     Review of Systems   Constitution: Positive for weakness and malaise/fatigue. Negative for chills, decreased appetite, weight gain and weight loss.   HENT: Negative for congestion, ear discharge, hoarse voice and tinnitus.    Eyes: Negative for blurred vision, discharge, visual disturbance and visual halos.   Cardiovascular: Negative for chest pain, claudication, dyspnea on exertion, irregular heartbeat, leg swelling, orthopnea and paroxysmal nocturnal dyspnea.   Respiratory: Negative for cough, shortness of breath, sputum production and wheezing.    Endocrine: Negative for cold intolerance, heat intolerance and polyuria.   Hematologic/Lymphatic: Negative for adenopathy. Does not bruise/bleed easily.   Skin: Positive for poor wound healing. Negative for dry skin, itching and suspicious lesions.   Musculoskeletal: Negative for arthritis, back pain, falls, joint pain, muscle weakness and myalgias.   Gastrointestinal: Negative for abdominal pain, constipation, diarrhea, dysphagia and hematemesis.   Genitourinary: Negative for bladder incontinence, dysuria and frequency.   Neurological: Negative for aphonia, disturbances in coordination and dizziness.   Psychiatric/Behavioral: Negative for altered mental status, depression, memory loss and substance abuse. The patient does not have insomnia and is not nervous/anxious.    Allergic/Immunologic: Negative for environmental allergies.       Code Status:    There are no questions and answers to display.       Physical Exam:     Vitals:  Ht 182.9 cm (72\")   BMI 43.29 kg/m²   T 98.0 P 76 R 16 /61 Sp02 96%  Physical Exam   Constitutional: He is oriented to person, " place, and time. He appears well-developed and well-nourished.   HENT:   Head: Normocephalic and atraumatic.   Nose: Nose normal.   Mouth/Throat: Oropharynx is clear and moist.   Eyes: EOM are normal. Pupils are equal, round, and reactive to light.   Neck: Normal range of motion. Neck supple.   Cardiovascular: Normal rate, regular rhythm, normal heart sounds and intact distal pulses.   Pulmonary/Chest: Effort normal and breath sounds normal.   Abdominal: Soft. Bowel sounds are normal.   obese   Musculoskeletal: Normal range of motion.   Generalized weakness   Neurological: He is alert and oriented to person, place, and time. He has normal reflexes.   Skin: Skin is warm and dry.   Dressing c.d.i   Psychiatric: He has a normal mood and affect. His behavior is normal.   Nursing note and vitals reviewed.    Data:     Lab Results (last 7 days)     Procedure Component Value Units Date/Time    Prealbumin [[id number]]  (Normal) Collected:  11/07/19 0457    Specimen:  Blood Updated:  11/07/19 1226     Prealbumin 20.6 mg/dL     POC Glucose Once [[id number]]  (Abnormal) Collected:  11/07/19 1135    Specimen:  Blood Updated:  11/07/19 1206     Glucose 311 mg/dL      Comment: : [id number] Page PatMeter ID: [id number]       Comprehensive Metabolic Panel [[id number]]  (Abnormal) Collected:  11/07/19 0457    Specimen:  Blood Updated:  11/07/19 0547     Glucose 251 mg/dL      BUN 29 mg/dL      Creatinine 1.68 mg/dL      Sodium 133 mmol/L      Potassium 4.7 mmol/L      Chloride 95 mmol/L      CO2 28.0 mmol/L      Calcium 8.8 mg/dL      Total Protein 6.7 g/dL      Albumin 3.70 g/dL      ALT (SGPT) 13 U/L      AST (SGOT) 13 U/L      Alkaline Phosphatase 72 U/L      Total Bilirubin 0.4 mg/dL      eGFR Non African Amer 41 mL/min/1.73      Globulin 3.0 gm/dL      A/G Ratio 1.2 g/dL      BUN/Creatinine Ratio 17.3     Anion Gap 10.0 mmol/L     Narrative:       GFR Normal >60  Chronic Kidney Disease <60  Kidney Failure <15    CBC &  Differential [[id number]] Collected:  11/07/19 0457    Specimen:  Blood Updated:  11/07/19 0541    Narrative:       The following orders were created for panel order CBC & Differential.  Procedure                               Abnormality         Status                     ---------                               -----------         ------                     CBC Auto Differential[[id number]]        Abnormal            Final result                 Please view results for these tests on the individual orders.    CBC Auto Differential [[id number]]  (Abnormal) Collected:  11/07/19 0457    Specimen:  Blood Updated:  11/07/19 0541     WBC 6.90 10*3/mm3      RBC 4.02 10*6/mm3      Hemoglobin 11.5 g/dL      Hematocrit 33.9 %      MCV 84.3 fL      MCH 28.6 pg      MCHC 33.9 g/dL      RDW 13.6 %      RDW-SD 41.9 fl      MPV 11.2 fL      Platelets 210 10*3/mm3      Neutrophil % 59.0 %      Lymphocyte % 19.0 %      Monocyte % 13.2 %      Eosinophil % 7.7 %      Basophil % 0.7 %      Immature Grans % 0.4 %      Neutrophils, Absolute 4.07 10*3/mm3      Lymphocytes, Absolute 1.31 10*3/mm3      Monocytes, Absolute 0.91 10*3/mm3      Eosinophils, Absolute 0.53 10*3/mm3      Basophils, Absolute 0.05 10*3/mm3      Immature Grans, Absolute 0.03 10*3/mm3      nRBC 0.0 /100 WBC     POC Glucose Once [[id number]]  (Abnormal) Collected:  11/07/19 0416    Specimen:  Blood Updated:  11/07/19 0447     Glucose 230 mg/dL      Comment: : SEGUNDO Frausto KathyMeter ID: [id number]       POC Glucose Once [[id number]]  (Abnormal) Collected:  11/07/19 0000    Specimen:  Blood Updated:  11/07/19 0012     Glucose 459 mg/dL      Comment: MDAOperator: SEGUNDO Frausto KathyMeter ID: [id number]       POC Glucose Once [[id number]]  (Abnormal) Collected:  11/06/19 2359    Specimen:  Blood Updated:  11/07/19 0012     Glucose 415 mg/dL      Comment: : SEGUNDO Frausto KathyMeter ID: [id number]       POC Glucose Once [[id number]]  (Abnormal)  Collected:  11/06/19 2216    Specimen:  Blood Updated:  11/06/19 2227     Glucose 428 mg/dL      Comment: : SEGUNDO Frausto KathyMeter ID: [id number]       POC Glucose Once [[id number]]  (Abnormal) Collected:  11/06/19 2214    Specimen:  Blood Updated:  11/06/19 2227     Glucose 531 mg/dL      Comment: NIVIAALLEDOperator: SEGUNDO Frausto KathyMeter ID: [id number]       POC Glucose Once [[id number]]  (Abnormal) Collected:  11/06/19 2212    Specimen:  Blood Updated:  11/06/19 2227     Glucose 469 mg/dL      Comment: MD  CALLEDOperator: SEGUNDO Frausto KathyMeter ID: [id number]       POC Glucose Once [[id number]]  (Abnormal) Collected:  11/06/19 2110    Specimen:  Blood Updated:  11/06/19 2130     Glucose 489 mg/dL      Comment: : SEGUNDO Frausto KathyMeter ID: [id number]           Nm Lung Ventilation Perfusion    Result Date: 10/30/2019  Narrative: EXAMINATION: NM LUNG VENTILATION PERFUSION- 10/30/2019 11:02 AM CDT  HISTORY: Acute onset chest pain  Comparison: Chest x-ray 10/29/2019, nuclear medicine VQ scan 08/29/2016  Radiopharmaceutical: 9.5 mCi of Xenon-133 for the ventilation study and 5.46 mCi Tc 99m MAA for the perfusion study.  Technique: Following inhalation of the aerosolized radiopharmaceutical, the ventilation study was performed with images of the thorax being obtained in posterior projection. Thereafter, the perfusion study was performed following the injection of radiolabeled MAA particles with images of the thorax obtained in 8 projections.  FINDINGS:  No pleural based, wedge-shape, matched or mismatched segmental ventilation perfusion defects are demonstrated.       Impression: Findings are most commonly associated with low probability for acute pulmonary embolism.  This report was finalized on 10/30/2019 11:02 by Dr. Justen Figueroa MD.    Ct Abdomen Pelvis With Contrast    Result Date: 10/27/2019  Narrative: CT ABDOMEN PELVIS W CONTRAST- 10/27/2019 6:32 PM CDT  HISTORY: Abdominal pain,  unspecified   COMPARISON: 10/12/2019.  DOSE LENGTH PRODUCT: 1919 mGy cm. Automated exposure control was also utilized to decrease patient radiation dose.  TECHNIQUE: Following the intravenous administration of contrast, helical CT tomographic images of the abdomen and pelvis were acquired. Multiplanar reformatted images were provided for review.  FINDINGS: LOWER CHEST: Findings in the chest will be described in a separate dictation.  LIVER: No focal liver lesion. The hepatic vasculature is patent.  BILIARY SYSTEM: The gallbladder has been removed. There is no evidence of biliary ductal dilation.  PANCREAS: There is mild atrophy in the pancreas.  SPLEEN: Unremarkable.  KIDNEYS: Simple cysts are seen in the RIGHT kidney. There has been no significant interval change. There is no hydronephrosis. The LEFT kidney is unremarkable. The ureters are decompressed and normal in appearance.  ADRENALS: Unremarkable.  RETROPERITONEUM: No mass, lymphadenopathy or hemorrhage.  GI TRACT: No evidence of obstruction or bowel wall thickening. The appendix has been removed.  OTHER: There is no mesenteric mass, lymphadenopathy or fluid collection. The abdominopelvic vasculature is patent. The osseous structures and soft tissues demonstrate no worrisome lesions. Mild subcutaneous edema is again noted in the lower abdominal wall.  PELVIS: No mass lesion, fluid collection or significant lymphadenopathy is seen in the pelvis. The urinary bladder is normal in appearance.      Impression: 1. No evidence of acute abdominopelvic process. 2. Subcutaneous edema in the anterior abdomen and in the dependent portions of the hips and buttocks. Findings could be due to volume overload.   This report was finalized on 10/27/2019 19:05 by Dr. Sudarshan Starr MD.    Ct Abdomen Pelvis With Contrast    Result Date: 10/12/2019  Narrative: EXAMINATION:  CT ABDOMEN PELVIS W CONTRAST-  10/12/2019 4:45 AM CDT  HISTORY: Abdominal pain. Elevated white blood cell  count of 11,770.  TECHNIQUE: Spiral CT was performed of the abdomen and pelvis with contrast. Multiplanar images were reconstructed.  DLP: 980 mGy-cm. Automated dosage control was utilized.  COMPARISON: 03/04/2019.  LUNG BASES: There are small bilateral pleural effusions. A small subpleural nodule in the right lower lobe laterally on image 1 of series 3 is clearly calcified on the previous study on 02/07/2019. There is mild dependent atelectasis. Coronary artery calcification is noted.  LIVER AND SPLEEN: Prior cholecystectomy. The liver and spleen are unremarkable.  PANCREAS: There is fatty atrophy of the pancreas.  KIDNEYS AND ADRENALS: The adrenal glands demonstrate no abnormality. There are 2 lower pole renal cyst on the right with the larger measuring 3.6 cm. The ureters are nondilated. The urinary bladder is unremarkable.  BOWEL: There is underdistention of the stomach. Small bowel loops are nondilated. Moderate stool in the colon. Prior history of appendectomy.  OTHER: Small retroperitoneal lymph nodes are likely reactive. There is mild atheromatous disease of the aortoiliac vessels. There are degenerative changes of the spine.      Impression: 1. Small bilateral pleural effusions with adjacent atelectasis. A small nodule in the right lower lobe is clearly calcified on the previous study. 2. Atheromatous disease of the aortoiliac vessels and coronary arteries. 3. Prior cholecystectomy. 4. Renal cysts on the right. 5. No acute appearing bowel abnormality. 6. Other nonacute findings, as discussed above. This report was finalized on 10/12/2019 07:42 by Dr. Raz Mendes MD.    Xr Chest 1 View    Result Date: 10/30/2019  Narrative: Exam: XR CHEST 1 VW-  Indication: Shortness of breath  Comparison: 10/27/2019  Findings:  The cardiac silhouette is within normal limits. Left-sided PICC tip overlies the cavoatrial junction. No pleural effusion, pneumothorax, or focal consolidation. Median sternotomy wires. Cervical  spine hardware is noted.      Impression:  No acute findings. This report was finalized on 10/30/2019 06:56 by Dr. Santana Figueredo MD.    Xr Chest 1 View    Result Date: 10/27/2019  Narrative: Exam: XR CHEST 1 VW-  Indication: Chest Pain Triage Protocol  Comparison: 10/12/2019  Findings:  Cardiac silhouette is stable. Postoperative change of CABG. Chronic mild right hemidiaphragm elevation. No definite consolidation, large pleural effusion, or visible pneumothorax. Left upper extremity PICC is stable in position. No acute osseous findings.      Impression:  No acute findings. This report was finalized on 10/27/2019 16:41 by Dr. Santana Figueredo MD.    Xr Chest 1 View    Result Date: 10/12/2019  Narrative: EXAMINATION:  XR CHEST 1 VW-  10/12/2019 4:10 AM CDT  HISTORY: Allergic reaction. Coronary artery disease. Prior heart bypass surgery. Diabetes. Hypertension.  COMPARISON: 10/08/2019.  FINDINGS:  There is hypoventilation with vascular crowding. There is no dense infiltrate. There is bronchial wall thickening. There is borderline cardiomegaly. There is a PICC line catheter on the left. There has been prior heart bypass surgery and cervical fusion.      Impression: 1. Hypoventilation with vascular crowding. 2. Stable bronchial wall thickening. No dense infiltrate or effusion.   This report was finalized on 10/12/2019 07:43 by Dr. Raz Mendes MD.    Us Venous Doppler Lower Extremity Right (duplex)    Result Date: 10/30/2019  Narrative: History: Right lower extremity swelling      Impression: Impression: There is no evidence of deep venous thrombosis or superficial thrombophlebitis of the right lower extremity.  Comments: Right lower extremity venous duplex exam was performed using color Doppler flow, Doppler wave form analysis, and grayscale imaging, with and without compression. There is no evidence of deep venous thrombosis of the common femoral, superficial femoral, popliteal, posterior tibial, and peroneal veins.  There is no thrombus identified in the saphenofemoral junction or the greater saphenous vein.  This report was finalized on 10/30/2019 16:11 by Dr. Talon Santacruz MD.    Ct Angiogram Chest    Result Date: 10/27/2019  Narrative: CT ANGIOGRAM CHEST- 10/27/2019 6:32 PM CDT  HISTORY: Chest pain, acute, PE suspected, intermed prob, positive D-dimer  COMPARISON: 01/18/2018.  DOSE LENGTH PRODUCT: 525 mGy cm. Automated exposure control was also utilized to decrease patient radiation dose.  TECHNIQUE: Helical tomographic images of the chest were obtained after the administration of intravenous contrast following angiogram protocol. Additionally, 3D and multiplanar reformatted images were provided.    FINDINGS:  Pulmonary arteries: There is adequate enhancement of the pulmonary arteries to evaluate for central and segmental pulmonary emboli. There are no filling defects within the main, lobar, segmental or visualized subsegmental pulmonary arteries. The pulmonary arteries are within normal limits for size.  Aorta and great vessels: The aorta is well opacified and demonstrates no dissection or aneurysm. The great vessels are normal in appearance. Changes of previous CABG are noted.  Visualized neck base: The imaged portion of the base of the neck appears unremarkable.  Lungs: Dependent changes are seen in both lung bases. There is no pulmonary edema or focal consolidation. No worrisome nodules are identified. Small pleural effusions are present bilaterally. The trachea and bronchial tree are patent.  Heart: Mild dilation of the heart is again noted. There is no pericardial effusion.  Mediastinum and lymph nodes: No enlarged mediastinal, hilar, or axillary lymph nodes are present.  Skeletal and soft tissues: The osseous structures of the thorax and surrounding soft tissues demonstrate no acute process.  Upper abdomen: The imaged portion of the upper abdomen demonstrates no acute process.      Impression: 1. Small pleural  effusions may be due to volume overload. 2. No evidence of pulmonary embolus or pneumonia.   This report was finalized on 10/27/2019 19:08 by Dr. Sudarshan Starr MD.    Xr Abdomen Kub    Result Date: 11/5/2019  Narrative: XR ABDOMEN KUB- 11/5/2019 4:25 PM CST  HISTORY: abd pain; N17.9-Acute kidney failure, unspecified; I50.9-Heart failure, unspecified; N39.0-Urinary tract infection, site not specified; Z74.09-Other reduced mobility   COMPARISON: None  FINDINGS: There is a nonspecific bowel gas pattern. No soft tissue mass or pathologic calcification  is visualized.  No acute skeletal abnormality is identified. Radiopaque monitoring leads project over the right abdomen      Impression: 1. Non specific bowel gas pattern..   This report was finalized on 11/05/2019 16:37 by Dr. Eddie Winter MD.    Xr Abdomen Kub    Result Date: 10/31/2019  Narrative: EXAMINATION: XR ABDOMEN KUB-  10/31/2019 9:11 PM CDT  HISTORY: severe abdominal pain; N17.9-Acute kidney failure, unspecified; I50.9-Heart failure, unspecified; N39.0-Urinary tract infection, site not specified; Z74.09-Other reduced mobility  1 view abdomen compared with 01/25/2019.  Nonspecific bowel gas pattern with no sign of obstruction.  No abnormal calcification or unexplained foreign body is seen.  Mild degenerative lumbar spine changes.  Summary: 1. Nonspecific bowel gas pattern. 2. Moderate fecal material is noted within the colon. This report was finalized on 10/31/2019 21:25 by Dr. Gomez Mcgill MD.        Assesment:           * No active hospital problems. *    Past Medical History:   Diagnosis Date   • Arthritis    • CHF (congestive heart failure) (CMS/[facility])    • Coronary artery disease    • Diabetes mellitus (CMS/HCC)    • Hyperlipidemia    • Hypertension    • Myocardial infarction (CMS/HCC)    • Pancreatitis    • Renal disorder    • Sleep apnea with use of continuous positive airway pressure (CPAP)        Plan:     1. Osteomyelitis right foot  2. Diabetic  ulcer right foot  3. Poorly controlled DM2 with hyperglycemia on long term insulin  4. Morbid obesity  5. Acute kidney injury on CKD3  6. Constipation  7. Diabetic neuropathy  8. Chronic diastolic CHF  9. GERD  10. HLD  11. BRITTNI    Continue current treatment. Monitor counts. Increase activity as tolerated. Wound care per wound care team/Dr. Cerrato. Continue antibiotics through 11/18 under direction of ID. Continue laxatives. Glycemic control - adjust insulin regimen. Monitor renal function. Labs Monday.       Electronically signed by SUSAN Muñoz on 11/7/2019 at 12:51 PM     Copy sent to Dr. Shetty, Del PARDO MD  I have discussed the care of Erick Luong, including pertinent history and exam findings, with the nurse practitioner.    I have seen and examined the patient and the key elements of all parts of the encounter have been performed by me.  I agree with the assessment, plan and orders as documented by SUSAN Haney, after I modified the exam findings and the plan of treatments and the final version is my approved version of the assessment.        Electronically signed by Juan Manuel Page MD on 11/8/2019 at 6:28 PM

## 2019-11-08 LAB
ANION GAP SERPL CALCULATED.3IONS-SCNC: 13 MMOL/L (ref 5–15)
BUN BLD-MCNC: 27 MG/DL (ref 8–23)
BUN/CREAT SERPL: 16.6 (ref 7–25)
CALCIUM SPEC-SCNC: 8.8 MG/DL (ref 8.6–10.5)
CHLORIDE SERPL-SCNC: 96 MMOL/L (ref 98–107)
CO2 SERPL-SCNC: 23 MMOL/L (ref 22–29)
CREAT BLD-MCNC: 1.63 MG/DL (ref 0.76–1.27)
GFR SERPL CREATININE-BSD FRML MDRD: 43 ML/MIN/1.73
GLUCOSE BLD-MCNC: 223 MG/DL (ref 65–99)
GLUCOSE BLDC GLUCOMTR-MCNC: 240 MG/DL (ref 70–130)
GLUCOSE BLDC GLUCOMTR-MCNC: 309 MG/DL (ref 70–130)
GLUCOSE BLDC GLUCOMTR-MCNC: 337 MG/DL (ref 70–130)
GLUCOSE BLDC GLUCOMTR-MCNC: 383 MG/DL (ref 70–130)
POTASSIUM BLD-SCNC: 4.9 MMOL/L (ref 3.5–5.2)
SODIUM BLD-SCNC: 132 MMOL/L (ref 136–145)

## 2019-11-08 PROCEDURE — 97535 SELF CARE MNGMENT TRAINING: CPT | Performed by: OCCUPATIONAL THERAPIST

## 2019-11-08 PROCEDURE — 63710000001 INSULIN LISPRO (HUMAN) PER 5 UNITS: Performed by: INTERNAL MEDICINE

## 2019-11-08 PROCEDURE — 80048 BASIC METABOLIC PNL TOTAL CA: CPT | Performed by: INTERNAL MEDICINE

## 2019-11-08 PROCEDURE — 25010000002 ENOXAPARIN PER 10 MG: Performed by: INTERNAL MEDICINE

## 2019-11-08 PROCEDURE — 25010000002 METHYLNALTREXONE 12 MG/0.6ML SOLUTION: Performed by: INTERNAL MEDICINE

## 2019-11-08 PROCEDURE — 82962 GLUCOSE BLOOD TEST: CPT

## 2019-11-08 PROCEDURE — 63710000001 INSULIN DETEMIR PER 5 UNITS: Performed by: INTERNAL MEDICINE

## 2019-11-08 PROCEDURE — 63710000001 INSULIN DETEMIR PER 5 UNITS: Performed by: NURSE PRACTITIONER

## 2019-11-08 NOTE — PROGRESS NOTES
"PROGRESS NOTE.      Patient:  Erick Luong  YOB: 1955  Date of Service: 11/8/2019  MRN: [id number]   Acct: [id number]   Primary Care Physician: Del Shetty MD  Advance Directive:   There are no questions and answers to display.     Admit Date: 11/6/2019       Hospital Day: 0  Referring Provider: Juan Manuel Page MD      Patient Seen, Chart, Consults, Notes, Labs, Radiology studies reviewed.        Subjective:  Erick Luong is a 64 y.o. male  whom we were consulted for acute kidney injury.  Baseline chronic kidney disease stage 3, baseline creatinine 1.3.  Has followed with our office on an irregular basis in the past.  History of type 2 diabetes, hypertension, congestive heart failure.  Undergoing treatment for chronic right foot wound; has PICC line in place and has been getting IV Vancomycin. He had an acute hospital stay at [facility] after he developed increasing edema, dyspnea and diminished urine output.  Hospital course remarkable for initiation of Bumex drip; good output since it was started. He had WANDER/ CKD stage 3 and hyperkalemia. He was stabilized and now transferred to LTAC for rehab. Renals service was called to monitor his electrolytes and follow his CKD stage 3. Patient has no dysuria.      Review of Systems:  History obtained from chart review and the patient  General ROS: No fever or chills  Respiratory ROS: No cough, shortness of breath, wheezing  Cardiovascular ROS: no chest pain or dyspnea on exertion  Gastrointestinal ROS: No abdominal pain or melena  Genito-Urinary ROS: No dysuria or hematuria  Musculoskeletal: negative  Skin: negative    Objective:  Ht 182.9 cm (72\")   BMI 43.29 kg/m²   No intake or output data in the 24 hours ending 11/08/19 1058    Physical examination:  General: awake/alert   Chest:  clear to auscultation bilaterally without respiratory distress  CVS: regular rate and rhythm  Abdominal: soft, nontender, normal bowel sounds  Extremities: Trace leg " edema  Skin: Right foot is covered with a dressing  Neuro: No focal motor deficits    Labs:  Lab Results (last 24 hours)     Procedure Component Value Units Date/Time    POC Glucose Once [[id number]]  (Abnormal) Collected:  11/08/19 0732    Specimen:  Blood Updated:  11/08/19 0745     Glucose 240 mg/dL      Comment: : [id number] Quarles EmilyMeter ID: [id number]       Basic Metabolic Panel [[id number]]  (Abnormal) Collected:  11/08/19 0443    Specimen:  Blood Updated:  11/08/19 0547     Glucose 223 mg/dL      BUN 27 mg/dL      Creatinine 1.63 mg/dL      Sodium 132 mmol/L      Potassium 4.9 mmol/L      Chloride 96 mmol/L      CO2 23.0 mmol/L      Calcium 8.8 mg/dL      eGFR Non African Amer 43 mL/min/1.73      BUN/Creatinine Ratio 16.6     Anion Gap 13.0 mmol/L     Narrative:       GFR Normal >60  Chronic Kidney Disease <60  Kidney Failure <15    POC Glucose Once [[id number]]  (Abnormal) Collected:  11/07/19 2056    Specimen:  Blood Updated:  11/07/19 2118     Glucose 332 mg/dL      Comment: : [email or web address] Frausto KathyMeter ID: [id number]       POC Glucose Once [[id number]]  (Abnormal) Collected:  11/07/19 1614    Specimen:  Blood Updated:  11/07/19 1628     Glucose 382 mg/dL      Comment: : [id number] Page PatMeter ID: [id number]       Prealbumin [[id number]]  (Normal) Collected:  11/07/19 0457    Specimen:  Blood Updated:  11/07/19 1226     Prealbumin 20.6 mg/dL     POC Glucose Once [[id number]]  (Abnormal) Collected:  11/07/19 1135    Specimen:  Blood Updated:  11/07/19 1206     Glucose 311 mg/dL      Comment: : [id number] Camilo PatMeter ID: [id number]             Radiology:   Imaging Results (Last 24 Hours)     ** No results found for the last 24 hours. **              Assessment     1.  Acute kidney injury due to ATN--stable  2.  Baseline chronic kidney disease stage 3  3.  Type 2 diabetes with nephropathy  4.  Essential hypertension  5.  Right foot wound  6.  Volume overload--improving  7.   Anemia   8.  Hyperkalemia- K level is adequate      Plan:  Continue oral diuretics at current dose and follow-up labs.Low K diet.  Will sign off for now.  Recall as needed.      Vinny Hartley MD  11/8/2019  10:58 AM

## 2019-11-08 NOTE — PROGRESS NOTES
[facility]  NIMESH Cortes M.D. Shauna Yadloski, APRN Meredith Crider, APRN      Internal Medicine Progress Note    11/8/2019   11:35 AM    Name:  Erick Luong  MRN:    [id number]     Acct:     [id number]   Room:  [address] [place] [address] Day: 0     Admit Date: 11/6/2019  4:54 PM  PCP: Del Shetty MD    Subjective:     C/C: weakness, need for continued wound care and iv antibiotics    Interval History: Status:  stayed the same. Up to chair. No family at bedside. Tolerating treatment thus far. Pain well controlled. Progressing with therapy. Renal function stable.  Blood sugars 220s-380s. Counts otherwise stable.     Review of Systems   Constitution: Negative for chills, decreased appetite, weakness, malaise/fatigue, weight gain and weight loss.   HENT: Negative for congestion, ear discharge, hoarse voice and tinnitus.    Eyes: Negative for blurred vision, discharge, visual disturbance and visual halos.   Cardiovascular: Negative for chest pain, claudication, dyspnea on exertion, irregular heartbeat, leg swelling, orthopnea and paroxysmal nocturnal dyspnea.   Respiratory: Negative for cough, shortness of breath, sputum production and wheezing.    Endocrine: Negative for cold intolerance, heat intolerance and polyuria.   Hematologic/Lymphatic: Negative for adenopathy. Does not bruise/bleed easily.   Skin: Positive for poor wound healing. Negative for dry skin, itching and suspicious lesions.   Musculoskeletal: Negative for arthritis, back pain, falls, joint pain, muscle weakness and myalgias.   Gastrointestinal: Negative for abdominal pain, constipation, diarrhea, dysphagia and hematemesis.   Genitourinary: Negative for bladder incontinence, dysuria and frequency.   Neurological: Negative for aphonia, disturbances in coordination and dizziness.   Psychiatric/Behavioral: Negative for altered mental status, depression, memory loss and substance abuse. The patient does not have insomnia and is  "not nervous/anxious.        Medications:     Allergies:   Allergies   Allergen Reactions   • Bactrim [Sulfamethoxazole-Trimethoprim] Other (See Comments)     \"RENAL FAILURE\"   • Vancomycin Itching       Current Meds:   Current Facility-Administered Medications:   •  acetaminophen (TYLENOL) tablet 650 mg, 650 mg, Oral, Q4H PRN **OR** acetaminophen (TYLENOL) 160 MG/5ML solution 650 mg, 650 mg, Oral, Q4H PRN **OR** acetaminophen (TYLENOL) suppository 650 mg, 650 mg, Rectal, Q4H PRN, Juan Manuel Page MD  •  aspirin EC tablet 81 mg, 81 mg, Oral, Daily, Juan Manuel Page MD  •  bisacodyl (DULCOLAX) suppository 10 mg, 10 mg, Rectal, Daily PRN, Juan Manuel Page MD  •  bumetanide (BUMEX) tablet 0.5 mg, 0.5 mg, Oral, Daily, Juan Manuel Page MD  •  clindamycin (CLEOCIN) 900 mg in dextrose 5% 50 mL IVPB (premix), 900 mg, Intravenous, Q8H, Juan Manuel Page MD  •  dextrose (D50W) 25 g/ 50mL Intravenous Solution 25 g, 25 g, Intravenous, Q15 Min PRN, Juan Manuel Page MD  •  dextrose (GLUTOSE) oral gel 15 g, 15 g, Oral, Q15 Min PRN, Juan Manuel Page MD  •  diphenhydrAMINE (BENADRYL) injection 25 mg, 25 mg, Intravenous, Q6H PRN, Juan Manuel Page MD  •  docusate sodium (COLACE) capsule 100 mg, 100 mg, Oral, BID, Juan Manuel Page MD  •  DULoxetine (CYMBALTA) DR capsule 60 mg, 60 mg, Oral, Daily, Juan Manuel Page MD  •  enoxaparin (LOVENOX) syringe 30 mg, 30 mg, Subcutaneous, Q24H, Juan Manuel Page MD  •  gabapentin (NEURONTIN) capsule 100 mg, 100 mg, Oral, BID, Juan Manuel Page MD  •  glucagon (human recombinant) (GLUCAGEN DIAGNOSTIC) injection 1 mg, 1 mg, Subcutaneous, Q15 Min PRN, Juan Manuel Page MD  •  insulin detemir (LEVEMIR) injection 10 Units, 10 Units, Subcutaneous, Once, Juan Manuel Page MD  •  insulin detemir (LEVEMIR) injection 30 Units, 30 Units, Subcutaneous, Q12H, Juan Manuel Page MD  •  insulin lispro (humaLOG) injection 0-24 Units, " 0-24 Units, Subcutaneous, 4x Daily With Meals & Nightly, Juan Manuel Page MD  •  ipratropium-albuterol (DUO-NEB) nebulizer solution 3 mL, 3 mL, Nebulization, Q6H PRN, Juan Manuel Page MD  •  lactulose solution 20 g, 20 g, Oral, BID, Juan Manuel Page MD  •  latanoprost (XALATAN) 0.005 % ophthalmic solution 1 drop, 1 drop, Both Eyes, Nightly, Juan Manuel Page MD  •  magnesium hydroxide (MILK OF MAGNESIA) suspension 2400 mg/10mL 10 mL, 10 mL, Oral, Daily PRN, Juan Manuel Page MD  •  methylnaltrexone (RELISTOR) injection 8 mg, 8 mg, Subcutaneous, Every Other Day, Juan Manuel Page MD  •  nystatin (MYCOSTATIN) powder, , Topical, Q12H, Juan Manuel Page MD  •  ondansetron (ZOFRAN) injection 4 mg, 4 mg, Intravenous, Q4H PRN, Juan Manuel Page MD  •  oxyCODONE-acetaminophen (PERCOCET) 7.5-325 MG per tablet 1 tablet, 1 tablet, Oral, Q4H PRN, Juan Manuel Page MD  •  pantoprazole (PROTONIX) EC tablet 40 mg, 40 mg, Oral, Nightly, Juan Manuel Page MD  •  pneumococcal conj. 13-valent (PREVNAR-13) vaccine 0.5 mL, 0.5 mL, Intramuscular, Once, Juan Manuel Page MD  •  polyethylene glycol 3350 powder (packet), 17 g, Oral, BID, Juan Manuel Page MD  •  rosuvastatin (CRESTOR) tablet 40 mg, 40 mg, Oral, Nightly, Juan Manuel Page MD  •  saccharomyces boulardii (FLORASTOR) capsule 250 mg, 250 mg, Oral, BID, Juan Manuel Page MD  •  sodium chloride 0.9 % flush 10 mL, 10 mL, Intravenous, Q12H, Juan Manuel Page MD  •  sodium chloride 0.9 % flush 10 mL, 10 mL, Intravenous, PRN, Juan Manuel Page MD  •  traZODone (DESYREL) tablet 25 mg, 25 mg, Oral, Nightly PRN, Juan Manuel Page MD    Data:     Code Status:    There are no questions and answers to display.       Family History   Problem Relation Age of Onset   • Colon cancer Father    • Heart disease Father    • Colon cancer Sister    • Colon polyps Sister    • Alzheimer's disease Mother    •  "Coronary artery disease Sister    • Coronary artery disease Sister        Social History     Socioeconomic History   • Marital status:      Spouse name: Not on file   • Number of children: Not on file   • Years of education: Not on file   • Highest education level: Not on file   Tobacco Use   • Smoking status: Never Smoker   • Smokeless tobacco: Never Used   • Tobacco comment: smoked in highschool   Substance and Sexual Activity   • Alcohol use: No   • Drug use: No   • Sexual activity: Defer       Vitals:  Ht 182.9 cm (72\")   BMI 43.29 kg/m²   T  98.4 P 68 R 18 /56 Sp02 99% (room air)          I/O (24Hr):  No intake or output data in the 24 hours ending 11/08/19 1135    Labs and imaging:      Recent Results (from the past 12 hour(s))   Basic Metabolic Panel    Collection Time: 11/08/19  4:43 AM   Result Value Ref Range    Glucose 223 (H) 65 - 99 mg/dL    BUN 27 (H) 8 - 23 mg/dL    Creatinine 1.63 (H) 0.76 - 1.27 mg/dL    Sodium 132 (L) 136 - 145 mmol/L    Potassium 4.9 3.5 - 5.2 mmol/L    Chloride 96 (L) 98 - 107 mmol/L    CO2 23.0 22.0 - 29.0 mmol/L    Calcium 8.8 8.6 - 10.5 mg/dL    eGFR Non African Amer 43 (L) >60 mL/min/1.73    BUN/Creatinine Ratio 16.6 7.0 - 25.0    Anion Gap 13.0 5.0 - 15.0 mmol/L   POC Glucose Once    Collection Time: 11/08/19  7:32 AM   Result Value Ref Range    Glucose 240 (H) 70 - 130 mg/dL                               Physical Examination:        Physical Exam   Constitutional: He is oriented to person, place, and time. He appears well-developed and well-nourished.   HENT:   Head: Normocephalic and atraumatic.   Nose: Nose normal.   Mouth/Throat: Oropharynx is clear and moist.   Eyes: EOM are normal. Pupils are equal, round, and reactive to light.   Neck: Normal range of motion. Neck supple.   Cardiovascular: Normal rate, regular rhythm, normal heart sounds and intact distal pulses.   Pulmonary/Chest: Effort normal and breath sounds normal.   Abdominal: Soft. Bowel sounds " are normal.   obese   Musculoskeletal: Normal range of motion.   Generalized weakness   Neurological: He is alert and oriented to person, place, and time. He has normal reflexes.   Skin: Skin is warm and dry.   Dressing c.d.i   Psychiatric: He has a normal mood and affect. His behavior is normal.         Assessment:            * No active hospital problems. *    Past Medical History:   Diagnosis Date   • Arthritis    • CHF (congestive heart failure) (CMS/[facility])    • Coronary artery disease    • Diabetes mellitus (CMS/[facility])    • Hyperlipidemia    • Hypertension    • Myocardial infarction (CMS/[facility])    • Pancreatitis    • Renal disorder    • Sleep apnea with use of continuous positive airway pressure (CPAP)         Plan:        1. Osteomyelitis right foot  2. Diabetic ulcer right foot  3. Poorly controlled DM2 with hyperglycemia on long term insulin  4. Morbid obesity  5. Acute kidney injury on CKD3  6. Constipation  7. Diabetic neuropathy  8. Chronic diastolic CHF  9. GERD  10. HLD  11. BRITTNI    Continue current treatment. Monitor counts. Increase activity. Labs Monday. Continue antibiotics under direction of ID. Wound care per wound care team/Dr. Cerrato. Glycemic control - titrate insulin. Maintain patient safety. Monitor renal function.       Electronically signed by SUSAN Muñoz on 11/8/2019 at 11:35 AM

## 2019-11-08 NOTE — CONSULTS
"INFECTIOUS DISEASES CONSULT NOTE    Patient:  Erick Luong 64 y.o. male  ROOM # 576/1  YOB: 1955  MRN: [id number]  [facility]:  [id number]  Admit date: 11/6/2019   Admitting Physician: Juan Manuel Page MD  Primary Care Physician: Del Shetty MD  REFERRING PROVIDER: Juan Manuel Page, *      REASON FOR CONSULTATION : \"continuity of care\"      HISTORY OF PRESENT ILLNESS: Patient is a 64-year-old diabetic gentleman who is been followed by Dr. Cerrato as an outpatient for diabetic foot infection with ulcer and underlying osteomyelitis of his right foot.    While hospitalized he developed a possible reaction to vancomycin and infectious diseases was asked to assist with alternative antibiotics.  He was started on clindamycin and has been tolerating that      Past Medical History:   Diagnosis Date   • Arthritis    • CHF (congestive heart failure) (CMS/HCC)    • Coronary artery disease    • Diabetes mellitus (CMS/HCC)    • Hyperlipidemia    • Hypertension    • Myocardial infarction (CMS/HCC)    • Pancreatitis    • Renal disorder    • Sleep apnea with use of continuous positive airway pressure (CPAP)      Past Surgical History:   Procedure Laterality Date   • ABDOMINAL SURGERY     • APPENDECTOMY     • BACK SURGERY     • CARDIAC SURGERY     • CATARACT EXTRACTION     • CERVICAL SPINE SURGERY     • COLONOSCOPY N/A 1/31/2017    Normal exam repeat in 5 years   • COLONOSCOPY N/A 2/11/2019    Procedure: COLONOSCOPY WITH ANESTHESIA;  Surgeon: Tyrell Smith MD;  Location: [facility] ENDOSCOPY;  Service: Gastroenterology   • COLONOSCOPY W/ POLYPECTOMY  03/04/2014    Hyperplastic polyp   • CORONARY ARTERY BYPASS GRAFT  10/2015   • ENDOSCOPY  04/13/2011    Gastritis with hemorrhage   • ENDOSCOPY N/A 5/5/2017    Normal exam   • ENDOSCOPY N/A 2/11/2019    Procedure: ESOPHAGOGASTRODUODENOSCOPY WITH ANESTHESIA;  Surgeon: Tyrell Smith MD;  Location: [facility] ENDOSCOPY;  Service: Gastroenterology   • INCISION AND " DRAINAGE OF WOUND Left 09/2007    spider bite     Family History   Problem Relation Age of Onset   • Colon cancer Father    • Heart disease Father    • Colon cancer Sister    • Colon polyps Sister    • Alzheimer's disease Mother    • Coronary artery disease Sister    • Coronary artery disease Sister      Social History     Socioeconomic History   • Marital status:      Spouse name: Not on file   • Number of children: Not on file   • Years of education: Not on file   • Highest education level: Not on file   Tobacco Use   • Smoking status: Never Smoker   • Smokeless tobacco: Never Used   • Tobacco comment: smoked in [employer]   Substance and Sexual Activity   • Alcohol use: No   • Drug use: No   • Sexual activity: Defer           Current Meds:     Current Facility-Administered Medications   Medication Dose Route Frequency Provider Last Rate Last Dose   • acetaminophen (TYLENOL) tablet 650 mg  650 mg Oral Q4H PRN Juan Manuel Page MD        Or   • acetaminophen (TYLENOL) 160 MG/5ML solution 650 mg  650 mg Oral Q4H PRN Juan Manuel Page MD        Or   • acetaminophen (TYLENOL) suppository 650 mg  650 mg Rectal Q4H PRN Juan Manuel Page MD       • aspirin EC tablet 81 mg  81 mg Oral Daily Juan Manuel Page MD       • bisacodyl (DULCOLAX) suppository 10 mg  10 mg Rectal Daily PRN Juan Manuel Page MD       • bumetanide (BUMEX) tablet 0.5 mg  0.5 mg Oral Daily Juan Manuel Page MD       • clindamycin (CLEOCIN) 900 mg in dextrose 5% 50 mL IVPB (premix)  900 mg Intravenous Q8H Juan Manuel Page MD       • dextrose (D50W) 25 g/ 50mL Intravenous Solution 25 g  25 g Intravenous Q15 Min PRN Juan Manuel Page MD       • dextrose (GLUTOSE) oral gel 15 g  15 g Oral Q15 Min PRN Juan Manuel Page MD       • diphenhydrAMINE (BENADRYL) injection 25 mg  25 mg Intravenous Q6H PRN Juan Manuel Page MD       • docusate sodium (COLACE) capsule 100 mg  100 mg Oral BID Juan Manuel Page  MD Santana       • DULoxetine (CYMBALTA) DR capsule 60 mg  60 mg Oral Daily Juan Manuel Page MD       • enoxaparin (LOVENOX) syringe 30 mg  30 mg Subcutaneous Q24H Juan Manuel Page MD       • gabapentin (NEURONTIN) capsule 100 mg  100 mg Oral BID Juan Manuel Page MD       • glucagon (human recombinant) (GLUCAGEN DIAGNOSTIC) injection 1 mg  1 mg Subcutaneous Q15 Min PRN Juan Manuel Page MD       • insulin detemir (LEVEMIR) injection 10 Units  10 Units Subcutaneous Once Juan Manuel Page MD       • insulin detemir (LEVEMIR) injection 30 Units  30 Units Subcutaneous Q12H Juan Manuel Page MD       • insulin lispro (humaLOG) injection 0-24 Units  0-24 Units Subcutaneous 4x Daily With Meals & Nightly Juan Manuel Page MD       • ipratropium-albuterol (DUO-NEB) nebulizer solution 3 mL  3 mL Nebulization Q6H PRN Juan Manuel Page MD       • lactulose solution 20 g  20 g Oral BID Juan Manuel Page MD       • latanoprost (XALATAN) 0.005 % ophthalmic solution 1 drop  1 drop Both Eyes Nightly Juan Manuel Page MD       • magnesium hydroxide (MILK OF MAGNESIA) suspension 2400 mg/10mL 10 mL  10 mL Oral Daily PRN Juan Manuel Page MD       • [START ON 11/8/2019] methylnaltrexone (RELISTOR) injection 8 mg  8 mg Subcutaneous Every Other Day Juan Manuel Page MD       • nystatin (MYCOSTATIN) powder   Topical Q12H Juan Manuel Page MD       • ondansetron (ZOFRAN) injection 4 mg  4 mg Intravenous Q4H PRN Juan Manuel Page MD       • oxyCODONE-acetaminophen (PERCOCET) 7.5-325 MG per tablet 1 tablet  1 tablet Oral Q4H PRN Juan Manuel Page MD       • pantoprazole (PROTONIX) EC tablet 40 mg  40 mg Oral Nightly Juan Manuel Page MD       • pneumococcal conj. 13-valent (PREVNAR-13) vaccine 0.5 mL  0.5 mL Intramuscular Once Juan Manuel Page MD       • polyethylene glycol 3350 powder (packet)  17 g Oral BID Juan Manuel Page MD       • rosuvastatin  (CRESTOR) tablet 40 mg  40 mg Oral Nightly Juan Manuel Page MD       • saccharomyces boulardii (FLORASTOR) capsule 250 mg  250 mg Oral BID Juan Manuel Page MD       • sodium chloride 0.9 % flush 10 mL  10 mL Intravenous Q12H Juan Manuel Page MD       • sodium chloride 0.9 % flush 10 mL  10 mL Intravenous PRN Juan Manuel Page MD       • traZODone (DESYREL) tablet 25 mg  25 mg Oral Nightly PRN Juan Manuel Page MD           Home Meds:  Prior to Admission medications    Medication Sig Start Date End Date Taking? Authorizing Provider   aspirin 81 MG EC tablet Take 81 mg by mouth Daily.    Bossman Blount MD   bumetanide (BUMEX) 2 MG tablet Take 1 tablet by mouth 2 (Two) Times a Day As Needed (edema).  Patient taking differently: Take 2 mg by mouth 2 (Two) Times a Day. 3/6/19   Telly Rodriguez MD   clindamycin (CLEOCIN) 900 MG/50ML IVPB Infuse 50 mL into a venous catheter Every 8 (Eight) Hours for 35 doses. 11/6/19 11/18/19  Willy Rollins MD   DULoxetine (CYMBALTA) 60 MG capsule Take 60 mg by mouth Daily.    Bossman Blount MD   gabapentin (NEURONTIN) 100 MG capsule Take 100 mg by mouth 2 (Two) Times a Day.    Bossman Blount MD   insulin glargine (LANTUS) 100 UNIT/ML injection Inject 20 Units under the skin into the appropriate area as directed Every Night.    Bossman Blount MD   insulin regular (humuLIN R) 500 UNIT/ML CONCENTRATED injection Inject 100 Units under the skin 3 (Three) Times a Day Before Meals. Packaging states 100 units with regular meals; 120 units with large meals or 360 units daily    Bossman Blount MD   lactulose (CHRONULAC) 10 GM/15ML solution Take 20 g by mouth 3 (Three) Times a Day As Needed.    Bossman Blount MD   latanoprost (XALATAN) 0.005 % ophthalmic solution Administer 1 drop to both eyes Every Night.    Bossman Blount MD   losartan (COZAAR) 100 MG tablet Take 100 mg by mouth Daily.    Bossman Blount MD  "  ondansetron ODT (ZOFRAN-ODT) 8 MG disintegrating tablet Take 8 mg by mouth Every 6 (Six) Hours As Needed for Nausea or Vomiting.    Bossman Blount MD   oxyCODONE-acetaminophen (PERCOCET)  MG per tablet Take 1 tablet by mouth 2 (Two) Times a Day.    Bossman Blount MD   pantoprazole (PROTONIX) 40 MG EC tablet Take 1 tablet by mouth Daily. 9/19/17   Emeka Garay MD   rosuvastatin (CRESTOR) 40 MG tablet Take 40 mg by mouth Daily.    Bossman Blount MD   saccharomyces boulardii (FLORASTOR) 250 MG capsule Take 250 mg by mouth 2 (Two) Times a Day.    Bossman Blount MD   traZODone (DESYREL) 50 MG tablet Take 25-50 mg by mouth At Night As Needed for Sleep.    Bossman Blount MD            Allergies   Allergen Reactions   • Bactrim [Sulfamethoxazole-Trimethoprim] Other (See Comments)     \"RENAL FAILURE\"   • Vancomycin Itching       Review of Systems   Constitutional: Negative for fever.   HENT: Negative for nosebleeds.    Eyes: Negative for photophobia.   Respiratory: Positive for shortness of breath (Improved previous admission).    Cardiovascular: Positive for leg swelling. Negative for chest pain.   Gastrointestinal: Positive for constipation and nausea. Negative for vomiting.   Endocrine: Negative for cold intolerance and heat intolerance.   Genitourinary: Negative for dysuria.   Musculoskeletal: Negative for arthralgias.   Skin: Positive for wound.   Neurological: Positive for weakness.         Vital Signs:  Ht 182.9 cm (72\")   BMI 43.29 kg/m²   No data recorded.  98    76    16    135/61    Physical Exam    General: The patient is an obese male lying in bed in no acute distress  HEENT: Sclera anicteric and noninjected  Respiratory: Effort even and unlabored  Abdomen: Obese soft bowel sounds are positive, no rebound or guarding appreciated  Psych: Is pleasant cooperative  Neuro: Alert, oriented, speech is clear  Extremities: Dressing in place around right foot.  See " photos from acute care admission    Line/IV site: PICCupper arm left, condition patent and no redness    Results Review:    I reviewed the patient's new clinical results.    Lab Results:  CBC:   Lab Results   Lab 11/05/19 0559 11/06/19 0544 11/07/19 0457   WBC 7.36 7.52 6.90   HEMOGLOBIN 11.2* 11.6* 11.5*   HEMATOCRIT 34.2* 35.4* 33.9*   PLATELETS 263 260 210       CMP:   Lab Results   Lab 11/05/19 0559 11/06/19 0544 11/07/19 0457   SODIUM 136 137 133*   POTASSIUM 5.0 5.4* 4.7   CHLORIDE 96* 94* 95*   CO2 32.0* 32.0* 28.0   BUN 33* 30* 29*   CREATININE 1.81* 1.67* 1.68*   CALCIUM 8.9 9.0 8.8   BILIRUBIN 0.4 0.4 0.4   ALK PHOS 69 71 72   ALT (SGPT) 13 13 13   AST (SGOT) 15 13 13   GLUCOSE 212* 213* 251*       Lab Results (last 72 hours)     Procedure Component Value Units Date/Time    POC Glucose Once [[id number]]  (Abnormal) Collected:  11/07/19 1614    Specimen:  Blood Updated:  11/07/19 1628     Glucose 382 mg/dL      Comment: : CHILANGO Page PatMeter ID: [id number]       Prealbumin [[id number]]  (Normal) Collected:  11/07/19 0457    Specimen:  Blood Updated:  11/07/19 1226     Prealbumin 20.6 mg/dL     POC Glucose Once [[id number]]  (Abnormal) Collected:  11/07/19 1135    Specimen:  Blood Updated:  11/07/19 1206     Glucose 311 mg/dL      Comment: : [email or web address] Wilson PatMeter ID: [id number]       Comprehensive Metabolic Panel [[id number]]  (Abnormal) Collected:  11/07/19 0457    Specimen:  Blood Updated:  11/07/19 0547     Glucose 251 mg/dL      BUN 29 mg/dL      Creatinine 1.68 mg/dL      Sodium 133 mmol/L      Potassium 4.7 mmol/L      Chloride 95 mmol/L      CO2 28.0 mmol/L      Calcium 8.8 mg/dL      Total Protein 6.7 g/dL      Albumin 3.70 g/dL      ALT (SGPT) 13 U/L      AST (SGOT) 13 U/L      Alkaline Phosphatase 72 U/L      Total Bilirubin 0.4 mg/dL      eGFR Non African Amer 41 mL/min/1.73      Globulin 3.0 gm/dL      A/G Ratio 1.2 g/dL      BUN/Creatinine Ratio 17.3     Anion Gap 10.0  mmol/L     Narrative:       GFR Normal >60  Chronic Kidney Disease <60  Kidney Failure <15    CBC & Differential [[id number]] Collected:  11/07/19 0457    Specimen:  Blood Updated:  11/07/19 0541    Narrative:       The following orders were created for panel order CBC & Differential.  Procedure                               Abnormality         Status                     ---------                               -----------         ------                     CBC Auto Differential[[id number]]        Abnormal            Final result                 Please view results for these tests on the individual orders.    CBC Auto Differential [[id number]]  (Abnormal) Collected:  11/07/19 0457    Specimen:  Blood Updated:  11/07/19 0541     WBC 6.90 10*3/mm3      RBC 4.02 10*6/mm3      Hemoglobin 11.5 g/dL      Hematocrit 33.9 %      MCV 84.3 fL      MCH 28.6 pg      MCHC 33.9 g/dL      RDW 13.6 %      RDW-SD 41.9 fl      MPV 11.2 fL      Platelets 210 10*3/mm3      Neutrophil % 59.0 %      Lymphocyte % 19.0 %      Monocyte % 13.2 %      Eosinophil % 7.7 %      Basophil % 0.7 %      Immature Grans % 0.4 %      Neutrophils, Absolute 4.07 10*3/mm3      Lymphocytes, Absolute 1.31 10*3/mm3      Monocytes, Absolute 0.91 10*3/mm3      Eosinophils, Absolute 0.53 10*3/mm3      Basophils, Absolute 0.05 10*3/mm3      Immature Grans, Absolute 0.03 10*3/mm3      nRBC 0.0 /100 WBC     POC Glucose Once [[id number]]  (Abnormal) Collected:  11/07/19 0416    Specimen:  Blood Updated:  11/07/19 0447     Glucose 230 mg/dL      Comment: : SEGUNDO Frausto KathyMeter ID: [id number]       POC Glucose Once [[id number]]  (Abnormal) Collected:  11/07/19 0000    Specimen:  Blood Updated:  11/07/19 0012     Glucose 459 mg/dL      Comment: MDAOperator: SEGUNDO Frausto KathyMeter ID: [id number]       POC Glucose Once [[id number]]  (Abnormal) Collected:  11/06/19 2359    Specimen:  Blood Updated:  11/07/19 0012     Glucose 415 mg/dL      Comment: :  SEGUNDO Frausto KathyMeter ID: [id number]       POC Glucose Once [[id number]]  (Abnormal) Collected:  11/06/19 2216    Specimen:  Blood Updated:  11/06/19 2227     Glucose 428 mg/dL      Comment: : SEGUNDO AmbrocioMeter ID: [id number]       POC Glucose Once [[id number]]  (Abnormal) Collected:  11/06/19 2214    Specimen:  Blood Updated:  11/06/19 2227     Glucose 531 mg/dL      Comment: NIVIAALLEDOperator: SEGUNDO CastillohyMeter ID: [id number]       POC Glucose Once [[id number]]  (Abnormal) Collected:  11/06/19 2212    Specimen:  Blood Updated:  11/06/19 2227     Glucose 469 mg/dL      Comment: MD  CALLEDOperator: SEGUNDO AmbrocioMeter ID: [id number]       POC Glucose Once [[id number]]  (Abnormal) Collected:  11/06/19 2110    Specimen:  Blood Updated:  11/06/19 2130     Glucose 489 mg/dL      Comment: : SEGUNDO AmbrocioMeter ID: [id number]             Culture Results:           Radiology:   Imaging Results (Last 72 Hours)     ** No results found for the last 72 hours. **            Assessment/Plan       * No active hospital problems. *      IMPRESSION:  1. Diabetic foot infection with ulcer and underlying osteomyelitis the right foot-followed by Dr. Cerrato as an outpatient.  Patient had been on intravenous vancomycin for this osteomyelitis since October 8 and plan was for 6-week course of therapy.  He has developed a reaction to vancomycin with itching and shortness of breath and a rapid response was called.  It is now listed as an allergy.  He was switched to IV clindamycin October 30  2. Poorly controlled diabetes-blood glucose in 200-400s  3. Chronic kidney disease stage III with baseline creatinine 1.3 per nephrology-  4. Nausea-improved         RECOMMENDATION:   Continue clindamycin.  -Plan previously was for stop date around November 18  Review Dr. Cerrato's note, he discussed potential surgical options for patient again as they are concerned that he may eventually need  that.     Per Dr. Cerrato's note patient started antibiotic therapy on October 8 and plan was for 6-week course. ( nov 18th end date)             Julia Adrian MD  11/07/19  6:52 PM

## 2019-11-09 LAB
GLUCOSE BLDC GLUCOMTR-MCNC: 210 MG/DL (ref 70–130)
GLUCOSE BLDC GLUCOMTR-MCNC: 210 MG/DL (ref 70–130)
GLUCOSE BLDC GLUCOMTR-MCNC: 321 MG/DL (ref 70–130)
GLUCOSE BLDC GLUCOMTR-MCNC: 331 MG/DL (ref 70–130)

## 2019-11-09 PROCEDURE — 25010000002 ENOXAPARIN PER 10 MG: Performed by: INTERNAL MEDICINE

## 2019-11-09 PROCEDURE — 63710000001 INSULIN DETEMIR PER 5 UNITS: Performed by: NURSE PRACTITIONER

## 2019-11-09 PROCEDURE — 63710000001 INSULIN LISPRO (HUMAN) PER 5 UNITS: Performed by: INTERNAL MEDICINE

## 2019-11-09 PROCEDURE — 82962 GLUCOSE BLOOD TEST: CPT

## 2019-11-09 NOTE — PROGRESS NOTES
[facility]  NIMESH Cortes M.D.  SUSAN Haney APRN      Internal Medicine Progress Note    11/9/2019   8:25 AM    Name:  Erick Luong  MRN:    [id number]     Acct:     [id number]   Room:  [address] [place] [address] Day: 0     Admit Date: 11/6/2019  4:54 PM  PCP: Del Shetty MD    Subjective:     C/C: weakness, need for continued wound care and iv antibiotics    Interval History: Status:  stayed the same.  Resting in bed. No family at bedside. Tolerating treatment thus far. Pain well controlled. Progressing with therapy. Renal function stable.  Glucose ranging from 210-383. Counts otherwise stable.  Complained of constipation with no bowel movement for 2 days.    Review of Systems   Constitution: Negative for chills, decreased appetite, weakness, malaise/fatigue, weight gain and weight loss.   HENT: Negative for congestion, ear discharge, hoarse voice and tinnitus.    Eyes: Negative for blurred vision, discharge, visual disturbance and visual halos.   Cardiovascular: Negative for chest pain, claudication, dyspnea on exertion, irregular heartbeat, leg swelling, orthopnea and paroxysmal nocturnal dyspnea.   Respiratory: Negative for cough, shortness of breath, sputum production and wheezing.    Endocrine: Negative for cold intolerance, heat intolerance and polyuria.   Hematologic/Lymphatic: Negative for adenopathy. Does not bruise/bleed easily.   Skin: Positive for poor wound healing. Negative for dry skin, itching and suspicious lesions.   Musculoskeletal: Negative for arthritis, back pain, falls, joint pain, muscle weakness and myalgias.   Gastrointestinal: Positive for constipation. Negative for abdominal pain, diarrhea, dysphagia and hematemesis.   Genitourinary: Negative for bladder incontinence, dysuria and frequency.   Neurological: Negative for aphonia, disturbances in coordination and dizziness.   Psychiatric/Behavioral: Negative for altered mental status,  "depression, memory loss and substance abuse. The patient does not have insomnia and is not nervous/anxious.        Medications:     Allergies:   Allergies   Allergen Reactions   • Bactrim [Sulfamethoxazole-Trimethoprim] Other (See Comments)     \"RENAL FAILURE\"   • Vancomycin Itching       Current Meds:   Current Facility-Administered Medications:   •  acetaminophen (TYLENOL) tablet 650 mg, 650 mg, Oral, Q4H PRN **OR** acetaminophen (TYLENOL) 160 MG/5ML solution 650 mg, 650 mg, Oral, Q4H PRN **OR** acetaminophen (TYLENOL) suppository 650 mg, 650 mg, Rectal, Q4H PRN, Juan Manuel Page MD  •  aspirin EC tablet 81 mg, 81 mg, Oral, Daily, Juan Manuel Page MD  •  bisacodyl (DULCOLAX) suppository 10 mg, 10 mg, Rectal, Daily PRN, Juan Manuel Page MD  •  bumetanide (BUMEX) tablet 0.5 mg, 0.5 mg, Oral, Daily, Jaun Manuel Page MD  •  clindamycin (CLEOCIN) 900 mg in dextrose 5% 50 mL IVPB (premix), 900 mg, Intravenous, Q8H, Juan Manuel Page MD  •  dextrose (D50W) 25 g/ 50mL Intravenous Solution 25 g, 25 g, Intravenous, Q15 Min PRN, Juan Manuel Page MD  •  dextrose (GLUTOSE) oral gel 15 g, 15 g, Oral, Q15 Min PRN, Juan Manuel Page MD  •  diphenhydrAMINE (BENADRYL) injection 25 mg, 25 mg, Intravenous, Q6H PRN, Juan Manuel Page MD  •  docusate sodium (COLACE) capsule 100 mg, 100 mg, Oral, BID, Juan Manuel Page MD  •  DULoxetine (CYMBALTA) DR capsule 60 mg, 60 mg, Oral, Daily, Juan Manuel Page MD  •  enoxaparin (LOVENOX) syringe 40 mg, 40 mg, Subcutaneous, Q24H, Juan Manuel Page MD  •  gabapentin (NEURONTIN) capsule 100 mg, 100 mg, Oral, BID, Juan Manuel Page MD  •  glucagon (human recombinant) (GLUCAGEN DIAGNOSTIC) injection 1 mg, 1 mg, Subcutaneous, Q15 Min PRN, Juan Manuel Page MD  •  insulin detemir (LEVEMIR) injection 10 Units, 10 Units, Subcutaneous, Once, Juan Manuel Page MD  •  insulin detemir (LEVEMIR) injection 40 Units, 40 Units, " Subcutaneous, Q12H, Shruthi Isabel APRN  •  insulin lispro (humaLOG) injection 0-24 Units, 0-24 Units, Subcutaneous, 4x Daily With Meals & Nightly, Juan Manuel Page MD  •  ipratropium-albuterol (DUO-NEB) nebulizer solution 3 mL, 3 mL, Nebulization, Q6H PRN, Juan Manuel Page MD  •  lactulose solution 20 g, 20 g, Oral, BID, Juan Manuel Page MD  •  latanoprost (XALATAN) 0.005 % ophthalmic solution 1 drop, 1 drop, Both Eyes, Nightly, Juan Manuel Page MD  •  magnesium hydroxide (MILK OF MAGNESIA) suspension 2400 mg/10mL 10 mL, 10 mL, Oral, Daily PRN, Juan Manuel Page MD  •  methylnaltrexone (RELISTOR) injection 8 mg, 8 mg, Subcutaneous, Every Other Day, Juan Manuel Page MD  •  nystatin (MYCOSTATIN) powder, , Topical, Q12H, Juan Manuel Page MD  •  ondansetron (ZOFRAN) injection 4 mg, 4 mg, Intravenous, Q4H PRN, Juan Manuel Page MD  •  oxyCODONE-acetaminophen (PERCOCET) 7.5-325 MG per tablet 1 tablet, 1 tablet, Oral, Q4H PRN, Juan Manuel Page MD  •  pantoprazole (PROTONIX) EC tablet 40 mg, 40 mg, Oral, Nightly, Juan Manuel Page MD  •  pneumococcal conj. 13-valent (PREVNAR-13) vaccine 0.5 mL, 0.5 mL, Intramuscular, Once, Juan Manuel Page MD  •  polyethylene glycol 3350 powder (packet), 17 g, Oral, BID, Juan Manuel Page MD  •  rosuvastatin (CRESTOR) tablet 40 mg, 40 mg, Oral, Nightly, Juan Manuel Page MD  •  saccharomyces boulardii (FLORASTOR) capsule 250 mg, 250 mg, Oral, BID, Juan Manuel Page MD  •  sodium chloride 0.9 % flush 10 mL, 10 mL, Intravenous, Q12H, Juan Manuel Page MD  •  sodium chloride 0.9 % flush 10 mL, 10 mL, Intravenous, PRN, Juan Manuel Page MD  •  traZODone (DESYREL) tablet 25 mg, 25 mg, Oral, Nightly PRNCamilo Richard Scott, MD    Data:     Code Status:    There are no questions and answers to display.       Family History   Problem Relation Age of Onset   • Colon cancer Father    • Heart disease  "Father    • Colon cancer Sister    • Colon polyps Sister    • Alzheimer's disease Mother    • Coronary artery disease Sister    • Coronary artery disease Sister        Social History     Socioeconomic History   • Marital status:      Spouse name: Not on file   • Number of children: Not on file   • Years of education: Not on file   • Highest education level: Not on file   Tobacco Use   • Smoking status: Never Smoker   • Smokeless tobacco: Never Used   • Tobacco comment: smoked in highschool   Substance and Sexual Activity   • Alcohol use: No   • Drug use: No   • Sexual activity: Defer       Vitals:  Ht 182.9 cm (72\")   BMI 43.29 kg/m²   T  98.4 P 77 R 16 /57 Sp02 95% (room air)    I/O (24Hr):  No intake or output data in the 24 hours ending 11/09/19 0825    Labs and imaging:      Recent Results (from the past 12 hour(s))   POC Glucose Once    Collection Time: 11/08/19  8:30 PM   Result Value Ref Range    Glucose 383 (H) 70 - 130 mg/dL   POC Glucose Once    Collection Time: 11/09/19  7:54 AM   Result Value Ref Range    Glucose 210 (H) 70 - 130 mg/dL           Physical Examination:        Physical Exam   Constitutional: He is oriented to person, place, and time. He appears well-developed and well-nourished.   HENT:   Head: Normocephalic and atraumatic.   Nose: Nose normal.   Mouth/Throat: Oropharynx is clear and moist.   Eyes: EOM are normal. Pupils are equal, round, and reactive to light.   Neck: Normal range of motion. Neck supple.   Cardiovascular: Normal rate, regular rhythm, normal heart sounds and intact distal pulses.   Pulmonary/Chest: Effort normal and breath sounds normal.   Abdominal: Soft. Bowel sounds are normal.   obese   Musculoskeletal: Normal range of motion.   Generalized weakness   Neurological: He is alert and oriented to person, place, and time. He has normal reflexes.   Skin: Skin is warm and dry.   Dressing c.d.i   Psychiatric: He has a normal mood and affect. His behavior is " normal.         Assessment:            * No active hospital problems. *    Past Medical History:   Diagnosis Date   • Arthritis    • CHF (congestive heart failure) (CMS/HCC)    • Coronary artery disease    • Diabetes mellitus (CMS/HCC)    • Hyperlipidemia    • Hypertension    • Myocardial infarction (CMS/HCC)    • Pancreatitis    • Renal disorder    • Sleep apnea with use of continuous positive airway pressure (CPAP)         Plan:        1. Osteomyelitis right foot  2. Diabetic ulcer right foot  3. Poorly controlled DM2 with hyperglycemia on long term insulin  4. Morbid obesity  5. Acute kidney injury on CKD3  6. Constipation  7. Diabetic neuropathy  8. Chronic diastolic CHF  9. GERD  10. HLD  11. BRITTNI    Continue current treatment. Monitor counts. Increase activity. Labs Monday. Continue antibiotics under direction of ID. Wound care per wound care team/Dr. Cerrato. Glycemic control - titrate insulin. Maintain patient safety. Monitor renal function.  Continue MiraLAX, Colace and lactulose and recommend giving Dulcolax suppository.      Electronically signed by SUSAN Trevino on 11/9/2019 at 8:25 AM     I have discussed the care of Erick Luong, including pertinent history and exam findings, with the nurse practitioner.    I have seen and examined the patient and the key elements of all parts of the encounter have been performed by me.  I agree with the assessment, plan and orders as documented by SUSAN Wilder, after I modified the exam findings and the plan of treatments and the final version is my approved version of the assessment.        Electronically signed by Juan Manuel Page MD on 11/9/2019 at 3:46 PM

## 2019-11-09 NOTE — PROGRESS NOTES
"INFECTIOUS DISEASES PROGRESS NOTE    Patient:  Erick Luong  YOB: 1955  MRN: [id number]   Admit date: 11/6/2019   Admitting Physician: Juan Manuel Page MD  Primary Care Physician: Del Shetty MD    Chief Complaint: \" doing ok\"    Interval History:    No recurrence of significant nausea or abdominal pain.  Bowels not moving daily like they were at home however patient having no discomfort.      Allergies:   Allergies   Allergen Reactions   • Bactrim [Sulfamethoxazole-Trimethoprim] Other (See Comments)     \"RENAL FAILURE\"   • Vancomycin Itching       Current Meds:     Current Facility-Administered Medications:   •  acetaminophen (TYLENOL) tablet 650 mg, 650 mg, Oral, Q4H PRN **OR** acetaminophen (TYLENOL) 160 MG/5ML solution 650 mg, 650 mg, Oral, Q4H PRN **OR** acetaminophen (TYLENOL) suppository 650 mg, 650 mg, Rectal, Q4H PRN, Juan Manuel Page MD  •  aspirin EC tablet 81 mg, 81 mg, Oral, Daily, Juan Manuel Page MD  •  bisacodyl (DULCOLAX) suppository 10 mg, 10 mg, Rectal, Daily PRN, Juan Manuel Page MD  •  bumetanide (BUMEX) tablet 0.5 mg, 0.5 mg, Oral, Daily, Juan Manuel Page MD  •  clindamycin (CLEOCIN) 900 mg in dextrose 5% 50 mL IVPB (premix), 900 mg, Intravenous, Q8H, Juan Manuel Page MD  •  dextrose (D50W) 25 g/ 50mL Intravenous Solution 25 g, 25 g, Intravenous, Q15 Min PRN, Juan Manuel Page MD  •  dextrose (GLUTOSE) oral gel 15 g, 15 g, Oral, Q15 Min PRN, Juan Manuel Page MD  •  diphenhydrAMINE (BENADRYL) injection 25 mg, 25 mg, Intravenous, Q6H PRN, Juan Manuel Page MD  •  docusate sodium (COLACE) capsule 100 mg, 100 mg, Oral, BID, Juan Manuel Page MD  •  DULoxetine (CYMBALTA) DR capsule 60 mg, 60 mg, Oral, Daily, Juan Manuel Page MD  •  enoxaparin (LOVENOX) syringe 40 mg, 40 mg, Subcutaneous, Q24H, Juan Manuel Page MD  •  gabapentin (NEURONTIN) capsule 100 mg, 100 mg, Oral, BID, Juan Manuel Page MD  •  " glucagon (human recombinant) (GLUCAGEN DIAGNOSTIC) injection 1 mg, 1 mg, Subcutaneous, Q15 Min PRN, Juan Manuel Page MD  •  insulin detemir (LEVEMIR) injection 10 Units, 10 Units, Subcutaneous, Once, Jaun Manuel Page MD  •  insulin detemir (LEVEMIR) injection 40 Units, 40 Units, Subcutaneous, Q12H, Shruthi Isabel APRN  •  insulin lispro (humaLOG) injection 0-24 Units, 0-24 Units, Subcutaneous, 4x Daily With Meals & Nightly, Juan Manuel Page MD  •  ipratropium-albuterol (DUO-NEB) nebulizer solution 3 mL, 3 mL, Nebulization, Q6H PRN, Juan Manuel Page MD  •  lactulose solution 20 g, 20 g, Oral, BID, Juan Manuel Page MD  •  latanoprost (XALATAN) 0.005 % ophthalmic solution 1 drop, 1 drop, Both Eyes, Nightly, Juan Manuel Page MD  •  magnesium hydroxide (MILK OF MAGNESIA) suspension 2400 mg/10mL 10 mL, 10 mL, Oral, Daily PRN, Juan Manuel Page MD  •  methylnaltrexone (RELISTOR) injection 8 mg, 8 mg, Subcutaneous, Every Other Day, Juan Manuel Page MD  •  nystatin (MYCOSTATIN) powder, , Topical, Q12H, Juan Manuel Page MD  •  ondansetron (ZOFRAN) injection 4 mg, 4 mg, Intravenous, Q4H PRN, Juan Manuel Page MD  •  oxyCODONE-acetaminophen (PERCOCET) 7.5-325 MG per tablet 1 tablet, 1 tablet, Oral, Q4H PRN, Juan Manuel Page MD  •  pantoprazole (PROTONIX) EC tablet 40 mg, 40 mg, Oral, Nightly, Juan Manuel Page MD  •  pneumococcal conj. 13-valent (PREVNAR-13) vaccine 0.5 mL, 0.5 mL, Intramuscular, Once, Juan Manuel Page MD  •  polyethylene glycol 3350 powder (packet), 17 g, Oral, BID, Juan Manuel Page MD  •  rosuvastatin (CRESTOR) tablet 40 mg, 40 mg, Oral, Nightly, Juan Manuel Page MD  •  saccharomyces boulardii (FLORASTOR) capsule 250 mg, 250 mg, Oral, BID, Juan Manuel Page MD  •  sodium chloride 0.9 % flush 10 mL, 10 mL, Intravenous, Q12H, Juan Manuel Page MD  •  sodium chloride 0.9 % flush 10 mL, 10 mL, Intravenous, PRN,  "Juan Manuel Page MD  •  traZODone (DESYREL) tablet 25 mg, 25 mg, Oral, Nightly PRN, Juan Manuel Page MD      Review of Systems   Constitutional: Negative for fever.   Gastrointestinal: Positive for nausea (Improved).       Objective     Vital Signs:  No data recorded.      Ht 182.9 cm (72\")   BMI 43.29 kg/m²     Temperature 98.4 pulse 77 respiratory rate 16 /57      Physical Exam   General: The patient is an obese gentleman sitting up in the chair in no acute distress.  HEENT: Sclera anicteric and noninjected  Abdomen: Obese, no focal tenderness, no rebound or guarding  Extremities:    Wound dressed.  Some drainage?  Betadine dried on plantar surface of the dressing.  Psych: He is pleasant and cooperative  Neuro: Alert and oriented, speech is clear.      Line/IV site: PICC upper arm left, condition patent and no redness.      Results Review:    I reviewed the patient's new clinical results.    Lab Results:  CBC:   Lab Results   Lab 11/03/19  0611 11/04/19  0713 11/05/19  0559 11/06/19  0544 11/07/19  0457   WBC 7.78 6.91 7.36 7.52 6.90   HEMOGLOBIN 11.0* 11.3* 11.2* 11.6* 11.5*   HEMATOCRIT 33.3* 34.1* 34.2* 35.4* 33.9*   PLATELETS 289 299 263 260 210         CMP:   Lab Results   Lab 11/05/19  0559 11/06/19  0544 11/07/19  0457 11/08/19  0443   SODIUM 136 137 133* 132*   POTASSIUM 5.0 5.4* 4.7 4.9   CHLORIDE 96* 94* 95* 96*   CO2 32.0* 32.0* 28.0 23.0   BUN 33* 30* 29* 27*   CREATININE 1.81* 1.67* 1.68* 1.63*   CALCIUM 8.9 9.0 8.8 8.8   BILIRUBIN 0.4 0.4 0.4  --    ALK PHOS 69 71 72  --    ALT (SGPT) 13 13 13  --    AST (SGOT) 15 13 13  --    GLUCOSE 212* 213* 251* 223*         Culture Results:            September cultures per Dr. Cerrato  Specimen Information: Foot, Right; Tissue           Tissue Culture     Lab   Light growth (2+) Staphylococcus aureus, MRSA Abnormal   [facility]     Methicillin resistant Staphylococcus aureus, Patient may be an isolation risk.          Gram Stain    Lab "   Occasional WBCs seen  [facility]       No organisms seen  [facility]             Susceptibility               Staphylococcus aureus, MRSA       MATT       Clindamycin 0.25 ug/ml Susceptible       Erythromycin 4 ug/ml Intermediate       Inducible Clindamycin Resistance NEG ug/ml Negative       Oxacillin >=4 ug/ml Resistant       Penicillin G >=0.5 ug/ml Resistant       Rifampin <=0.5 ug/ml Susceptible       Tetracycline <=1 ug/ml Susceptible       Trimethoprim + Sulfamethoxazole <=10 ug/ml Susceptible       Vancomycin <=0.5 ug/ml Susceptible                 Susceptibility Comments      Staphylococcus aureus, MRSA   This isolate does not demonstrate inducible clindamycin resistance in vitro.          Specimen Collected: 09/19/19 12:06 Last Resulted: 09/22/19 10:57 Order Details View Encounter Lab                   Radiology:   Imaging Results (Last 72 Hours)     ** No results found for the last 72 hours. **          Assessment/Plan     There are no active hospital problems to display for this patient.      IMPRESSION:  1. Diabetic foot infection with ulcer and underlying osteomyelitis the right foot-followed by Dr. Cerrato as an outpatient.  Patient had been on intravenous vancomycin for this osteomyelitis since October 8 and plan was for 6-week course of therapy.  He has developed a reaction to vancomycin with itching and shortness of breath and a rapid response was called.  It is now listed as an allergy.  He was switched to IV clindamycin October 30  2. Poorly controlled diabetes-blood glucose in 200-400s  3. Chronic kidney disease stage III with baseline creatinine 1.3 per nephrology-  4. Nausea- resolved      RECOMMENDATION:   Continue clindamycin.  -Plan previously was for stop date around November 18  Review Dr. Cerrato's note, he discussed potential surgical options for patient again as they are concerned that he may eventually need that.    Per Dr. Cerrato's note patient started antibiotic therapy on October 8 and  plan was for 6-week course. ( nov 18th end date)      Infectious diseases will sign off.    Julia Adrian MD  11/09/19  12:29 PM

## 2019-11-10 LAB
GLUCOSE BLDC GLUCOMTR-MCNC: 209 MG/DL (ref 70–130)
GLUCOSE BLDC GLUCOMTR-MCNC: 252 MG/DL (ref 70–130)
GLUCOSE BLDC GLUCOMTR-MCNC: 275 MG/DL (ref 70–130)

## 2019-11-10 PROCEDURE — 82962 GLUCOSE BLOOD TEST: CPT

## 2019-11-10 PROCEDURE — 63710000001 INSULIN DETEMIR PER 5 UNITS: Performed by: NURSE PRACTITIONER

## 2019-11-10 PROCEDURE — 63710000001 INSULIN LISPRO (HUMAN) PER 5 UNITS: Performed by: INTERNAL MEDICINE

## 2019-11-10 PROCEDURE — 63710000001 INSULIN DETEMIR PER 5 UNITS: Performed by: INTERNAL MEDICINE

## 2019-11-10 PROCEDURE — 25010000002 ENOXAPARIN PER 10 MG: Performed by: INTERNAL MEDICINE

## 2019-11-10 RX ORDER — SORBITOL SOLUTION 70 %
30 SOLUTION, ORAL MISCELLANEOUS EVERY 6 HOURS SCHEDULED
Status: DISCONTINUED | OUTPATIENT
Start: 2019-11-10 | End: 2019-11-13

## 2019-11-10 NOTE — PROGRESS NOTES
[facility]  NIMESH Cortes M.D.  SUSAN Haney APRN      Internal Medicine Progress Note    11/10/2019   10:09 AM    Name:  Erick Luong  MRN:    [id number]     Acct:     [id number]   Room:  [address] [place] [address] Day: 0     Admit Date: 11/6/2019  4:54 PM  PCP: Del Shetty MD    Subjective:     C/C: weakness, need for continued wound care and iv antibiotics    Interval History: Status:  stayed the same.  Resting in bed. No family at bedside. Tolerating treatment thus far. Pain well controlled. Progressing with therapy. Renal function stable.  Glucose ranging from 210-331. Counts otherwise stable.  Complained of constipation with no bowel movement for 3 days, receives Relistor every other day, will add sorbitol.    Review of Systems   Constitution: Negative for chills, decreased appetite, weakness, malaise/fatigue, weight gain and weight loss.   HENT: Negative for congestion, ear discharge, hoarse voice and tinnitus.    Eyes: Negative for blurred vision, discharge, visual disturbance and visual halos.   Cardiovascular: Negative for chest pain, claudication, dyspnea on exertion, irregular heartbeat, leg swelling, orthopnea and paroxysmal nocturnal dyspnea.   Respiratory: Negative for cough, shortness of breath, sputum production and wheezing.    Endocrine: Negative for cold intolerance, heat intolerance and polyuria.   Hematologic/Lymphatic: Negative for adenopathy. Does not bruise/bleed easily.   Skin: Positive for poor wound healing. Negative for dry skin, itching and suspicious lesions.   Musculoskeletal: Negative for arthritis, back pain, falls, joint pain, muscle weakness and myalgias.   Gastrointestinal: Positive for constipation. Negative for abdominal pain, diarrhea, dysphagia and hematemesis.   Genitourinary: Negative for bladder incontinence, dysuria and frequency.   Neurological: Negative for aphonia, disturbances in coordination and dizziness.  "  Psychiatric/Behavioral: Negative for altered mental status, depression, memory loss and substance abuse. The patient does not have insomnia and is not nervous/anxious.        Medications:     Allergies:   Allergies   Allergen Reactions   • Bactrim [Sulfamethoxazole-Trimethoprim] Other (See Comments)     \"RENAL FAILURE\"   • Vancomycin Itching       Current Meds:   Current Facility-Administered Medications:   •  acetaminophen (TYLENOL) tablet 650 mg, 650 mg, Oral, Q4H PRN **OR** acetaminophen (TYLENOL) 160 MG/5ML solution 650 mg, 650 mg, Oral, Q4H PRN **OR** acetaminophen (TYLENOL) suppository 650 mg, 650 mg, Rectal, Q4H PRN, Juan Manuel Page MD  •  aspirin EC tablet 81 mg, 81 mg, Oral, Daily, Juan Manuel Page MD  •  bisacodyl (DULCOLAX) suppository 10 mg, 10 mg, Rectal, Daily PRN, Juan Manuel Page MD  •  bumetanide (BUMEX) tablet 0.5 mg, 0.5 mg, Oral, Daily, Juan Manuel Page MD  •  clindamycin (CLEOCIN) 900 mg in dextrose 5% 50 mL IVPB (premix), 900 mg, Intravenous, Q8H, Juan Manuel Page MD  •  dextrose (D50W) 25 g/ 50mL Intravenous Solution 25 g, 25 g, Intravenous, Q15 Min PRN, Juan Manuel Page MD  •  dextrose (GLUTOSE) oral gel 15 g, 15 g, Oral, Q15 Min PRN, Juan Manuel Page MD  •  diphenhydrAMINE (BENADRYL) injection 25 mg, 25 mg, Intravenous, Q6H PRN, Juan Manuel Page MD  •  docusate sodium (COLACE) capsule 100 mg, 100 mg, Oral, BID, Juan Manuel Page MD  •  DULoxetine (CYMBALTA) DR capsule 60 mg, 60 mg, Oral, Daily, Juan Manuel Page MD  •  enoxaparin (LOVENOX) syringe 40 mg, 40 mg, Subcutaneous, Q24H, Juan Manuel Page MD  •  gabapentin (NEURONTIN) capsule 100 mg, 100 mg, Oral, BID, Juan Manuel Page MD  •  glucagon (human recombinant) (GLUCAGEN DIAGNOSTIC) injection 1 mg, 1 mg, Subcutaneous, Q15 Min PRN, Juan Manuel Page MD  •  insulin detemir (LEVEMIR) injection 10 Units, 10 Units, Subcutaneous, Once, Juan Manuel Page MD  •  " insulin detemir (LEVEMIR) injection 40 Units, 40 Units, Subcutaneous, Q12H, Shruthi Isabel APRN  •  insulin lispro (humaLOG) injection 0-24 Units, 0-24 Units, Subcutaneous, 4x Daily With Meals & Nightly, Juan Manuel Page MD  •  ipratropium-albuterol (DUO-NEB) nebulizer solution 3 mL, 3 mL, Nebulization, Q6H PRN, Juan Manuel Page MD  •  lactulose solution 20 g, 20 g, Oral, BID, Juan Manuel Page MD  •  latanoprost (XALATAN) 0.005 % ophthalmic solution 1 drop, 1 drop, Both Eyes, Nightly, Juan Manuel Page MD  •  magnesium hydroxide (MILK OF MAGNESIA) suspension 2400 mg/10mL 10 mL, 10 mL, Oral, Daily PRN, Juan Manuel Page MD  •  methylnaltrexone (RELISTOR) injection 8 mg, 8 mg, Subcutaneous, Every Other Day, Juan Manuel Page MD  •  nystatin (MYCOSTATIN) powder, , Topical, Q12H, Juan Manuel Page MD  •  ondansetron (ZOFRAN) injection 4 mg, 4 mg, Intravenous, Q4H PRN, Juan Manuel Page MD  •  oxyCODONE-acetaminophen (PERCOCET) 7.5-325 MG per tablet 1 tablet, 1 tablet, Oral, Q4H PRN, Juan Manuel Page MD  •  pantoprazole (PROTONIX) EC tablet 40 mg, 40 mg, Oral, Nightly, Juan Manuel Page MD  •  pneumococcal conj. 13-valent (PREVNAR-13) vaccine 0.5 mL, 0.5 mL, Intramuscular, Once, Juan Manuel Page MD  •  polyethylene glycol 3350 powder (packet), 17 g, Oral, BID, Juan Manuel Page MD  •  rosuvastatin (CRESTOR) tablet 40 mg, 40 mg, Oral, Nightly, Juan Manuel Page MD  •  saccharomyces boulardii (FLORASTOR) capsule 250 mg, 250 mg, Oral, BID, Juan Manuel Page MD  •  sodium chloride 0.9 % flush 10 mL, 10 mL, Intravenous, Q12H, Juan Manuel Page MD  •  sodium chloride 0.9 % flush 10 mL, 10 mL, Intravenous, PRN, Juan Manuel Page MD  •  traZODone (DESYREL) tablet 25 mg, 25 mg, Oral, Nightly Camilo CUADRA Richard Scott, MD    Data:     Code Status:    There are no questions and answers to display.       Family History   Problem Relation Age  "of Onset   • Colon cancer Father    • Heart disease Father    • Colon cancer Sister    • Colon polyps Sister    • Alzheimer's disease Mother    • Coronary artery disease Sister    • Coronary artery disease Sister        Social History     Socioeconomic History   • Marital status:      Spouse name: Not on file   • Number of children: Not on file   • Years of education: Not on file   • Highest education level: Not on file   Tobacco Use   • Smoking status: Never Smoker   • Smokeless tobacco: Never Used   • Tobacco comment: smoked in highschool   Substance and Sexual Activity   • Alcohol use: No   • Drug use: No   • Sexual activity: Defer       Vitals:  Ht 182.9 cm (72\")   BMI 43.29 kg/m²   T  98.6 P 78 R 18 /[id number] 97% (room air)    I/O (24Hr):  No intake or output data in the 24 hours ending 11/10/19 1009    Labs and imaging:      Recent Results (from the past 12 hour(s))   POC Glucose Once    Collection Time: 11/10/19  7:46 AM   Result Value Ref Range    Glucose 209 (H) 70 - 130 mg/dL           Physical Examination:        Physical Exam   Constitutional: He is oriented to person, place, and time. He appears well-developed and well-nourished.   HENT:   Head: Normocephalic and atraumatic.   Nose: Nose normal.   Mouth/Throat: Oropharynx is clear and moist.   Eyes: EOM are normal. Pupils are equal, round, and reactive to light.   Neck: Normal range of motion. Neck supple.   Cardiovascular: Normal rate, regular rhythm, normal heart sounds and intact distal pulses.   Pulmonary/Chest: Effort normal and breath sounds normal.   Abdominal: Soft. Bowel sounds are normal.   obese   Musculoskeletal: Normal range of motion.   Generalized weakness   Neurological: He is alert and oriented to person, place, and time. He has normal reflexes.   Skin: Skin is warm and dry.   Dressing c.d.i   Psychiatric: He has a normal mood and affect. His behavior is normal.         Assessment:            * No active hospital problems. " *    Past Medical History:   Diagnosis Date   • Arthritis    • CHF (congestive heart failure) (CMS/[facility])    • Coronary artery disease    • Diabetes mellitus (CMS/[facility])    • Hyperlipidemia    • Hypertension    • Myocardial infarction (CMS/[facility])    • Pancreatitis    • Renal disorder    • Sleep apnea with use of continuous positive airway pressure (CPAP)         Plan:        1. Osteomyelitis right foot  2. Diabetic ulcer right foot  3. Poorly controlled DM2 with hyperglycemia on long term insulin  4. Morbid obesity  5. Acute kidney injury on CKD3  6. Constipation  7. Diabetic neuropathy  8. Chronic diastolic CHF  9. GERD  10. HLD  11. BRITTNI    Continue current treatment. Monitor counts. Increase activity. Labs Monday. Continue antibiotics under direction of ID. Wound care per wound care team/Dr. Cerrato. Glycemic control -increase Levemir to 45 units subcu every 12. Maintain patient safety. Monitor renal function.  Continue MiraLAX, Colace, Relistor and lactulose and will add sorbitol 30 g p.o. every 6 hours until bowel movement and then as needed.      Electronically signed by SUSAN Trevino on 11/10/2019 at 10:09 AM

## 2019-11-11 LAB
ANION GAP SERPL CALCULATED.3IONS-SCNC: 8 MMOL/L (ref 5–15)
BASOPHILS # BLD AUTO: 0.05 10*3/MM3 (ref 0–0.2)
BASOPHILS NFR BLD AUTO: 0.7 % (ref 0–1.5)
BUN BLD-MCNC: 24 MG/DL (ref 8–23)
BUN/CREAT SERPL: 15.7 (ref 7–25)
CALCIUM SPEC-SCNC: 8.9 MG/DL (ref 8.6–10.5)
CHLORIDE SERPL-SCNC: 99 MMOL/L (ref 98–107)
CO2 SERPL-SCNC: 28 MMOL/L (ref 22–29)
CREAT BLD-MCNC: 1.53 MG/DL (ref 0.76–1.27)
CRP SERPL-MCNC: 0.57 MG/DL (ref 0–0.5)
DEPRECATED RDW RBC AUTO: 42.5 FL (ref 37–54)
EOSINOPHIL # BLD AUTO: 0.53 10*3/MM3 (ref 0–0.4)
EOSINOPHIL NFR BLD AUTO: 7.3 % (ref 0.3–6.2)
ERYTHROCYTE [DISTWIDTH] IN BLOOD BY AUTOMATED COUNT: 13.7 % (ref 12.3–15.4)
ERYTHROCYTE [SEDIMENTATION RATE] IN BLOOD: 36 MM/HR (ref 0–15)
GFR SERPL CREATININE-BSD FRML MDRD: 46 ML/MIN/1.73
GLUCOSE BLD-MCNC: 356 MG/DL (ref 65–99)
GLUCOSE BLDC GLUCOMTR-MCNC: 169 MG/DL (ref 70–130)
GLUCOSE BLDC GLUCOMTR-MCNC: 260 MG/DL (ref 70–130)
GLUCOSE BLDC GLUCOMTR-MCNC: 296 MG/DL (ref 70–130)
GLUCOSE BLDC GLUCOMTR-MCNC: 361 MG/DL (ref 70–130)
HCT VFR BLD AUTO: 35 % (ref 37.5–51)
HGB BLD-MCNC: 11.7 G/DL (ref 13–17.7)
IMM GRANULOCYTES # BLD AUTO: 0.05 10*3/MM3 (ref 0–0.05)
IMM GRANULOCYTES NFR BLD AUTO: 0.7 % (ref 0–0.5)
LYMPHOCYTES # BLD AUTO: 1.59 10*3/MM3 (ref 0.7–3.1)
LYMPHOCYTES NFR BLD AUTO: 21.8 % (ref 19.6–45.3)
MCH RBC QN AUTO: 28.4 PG (ref 26.6–33)
MCHC RBC AUTO-ENTMCNC: 33.4 G/DL (ref 31.5–35.7)
MCV RBC AUTO: 85 FL (ref 79–97)
MONOCYTES # BLD AUTO: 0.95 10*3/MM3 (ref 0.1–0.9)
MONOCYTES NFR BLD AUTO: 13 % (ref 5–12)
NEUTROPHILS # BLD AUTO: 4.11 10*3/MM3 (ref 1.7–7)
NEUTROPHILS NFR BLD AUTO: 56.5 % (ref 42.7–76)
NRBC BLD AUTO-RTO: 0 /100 WBC (ref 0–0.2)
PLATELET # BLD AUTO: 222 10*3/MM3 (ref 140–450)
PMV BLD AUTO: 11.5 FL (ref 6–12)
POTASSIUM BLD-SCNC: 5 MMOL/L (ref 3.5–5.2)
RBC # BLD AUTO: 4.12 10*6/MM3 (ref 4.14–5.8)
SODIUM BLD-SCNC: 135 MMOL/L (ref 136–145)
WBC NRBC COR # BLD: 7.28 10*3/MM3 (ref 3.4–10.8)

## 2019-11-11 PROCEDURE — 80048 BASIC METABOLIC PNL TOTAL CA: CPT | Performed by: INTERNAL MEDICINE

## 2019-11-11 PROCEDURE — 85651 RBC SED RATE NONAUTOMATED: CPT | Performed by: INTERNAL MEDICINE

## 2019-11-11 PROCEDURE — 63710000001 INSULIN DETEMIR PER 5 UNITS: Performed by: INTERNAL MEDICINE

## 2019-11-11 PROCEDURE — 82962 GLUCOSE BLOOD TEST: CPT

## 2019-11-11 PROCEDURE — 63710000001 INSULIN LISPRO (HUMAN) PER 5 UNITS: Performed by: INTERNAL MEDICINE

## 2019-11-11 PROCEDURE — 25010000002 ENOXAPARIN PER 10 MG: Performed by: INTERNAL MEDICINE

## 2019-11-11 PROCEDURE — 86140 C-REACTIVE PROTEIN: CPT | Performed by: INTERNAL MEDICINE

## 2019-11-11 PROCEDURE — 85025 COMPLETE CBC W/AUTO DIFF WBC: CPT | Performed by: INTERNAL MEDICINE

## 2019-11-11 NOTE — PROGRESS NOTES
PROGRESS NOTE.      Patient:  Erick Luong  YOB: 1955  Date of Service: 11/11/2019  MRN: [id number]   Acct: [id number]   Primary Care Physician: Del Shetty MD  Advance Directive:   There are no questions and answers to display.     Admit Date: 11/6/2019       Hospital Day: 0  Referring Provider: Juan Manuel Page MD      Patient Seen, Chart, Consults, Notes, Labs, Radiology studies reviewed.    Chief complaint: Abnormal labs.    Subjective:  Erick Luong is a 64 y.o. male  whom we were consulted for acute kidney injury.  Baseline chronic kidney disease stage 3, baseline creatinine 1.3.  Has followed with our office on an irregular basis in the past.  History of type 2 diabetes, hypertension, congestive heart failure.  Undergoing treatment for chronic right foot wound; has PICC line in place and has been getting IV Vancomycin. He had an acute hospital stay at [facility] after he developed increasing edema, dyspnea and diminished urine output.  Hospital course remarkable for initiation of Bumex drip; good output since it was started. He had WANDER/ CKD stage 3 and hyperkalemia. He was stabilized and now transferred to LTAC for rehab. Renals service was called to monitor his electrolytes and follow his CKD stage 3. Patient has no dysuria.    This morning he is sitting up in the chair and feeling well.      Review of Systems:  History obtained from chart review and the patient  General ROS: No fever or chills  Respiratory ROS: No cough, shortness of breath, wheezing  Cardiovascular ROS: no chest pain or dyspnea on exertion  Gastrointestinal ROS: No abdominal pain or melena  Genito-Urinary ROS: No dysuria or hematuria  Musculoskeletal: negative  Skin: negative    Objective:  Blood pressure: 140/70 mmHg  Heart rate is 79 bpm  O2 saturation 100%.      Physical examination:  HEENT: Normocephalic atraumatic head  Neck: Supple with no JVD.  General: awake/alert   Chest:  clear to auscultation  bilaterally without respiratory distress  CVS: regular rate and rhythm  Abdominal: soft, nontender, normal bowel sounds  Extremities: Trace leg edema  Skin: Right foot is covered with a dressing  Neuro: No focal motor deficits    Labs:  Lab Results (last 24 hours)     Procedure Component Value Units Date/Time    POC Glucose Once [[id number]]  (Abnormal) Collected:  11/11/19 1136    Specimen:  Blood Updated:  11/11/19 1155     Glucose 361 mg/dL      Comment: : [email or web address] Mathews CarolynMeter ID: [id number]       POC Glucose Once [[id number]]  (Abnormal) Collected:  11/11/19 0740    Specimen:  Blood Updated:  11/11/19 0754     Glucose 296 mg/dL      Comment: : [email or web address] Mathews CarolynMeter ID: [id number]       Basic Metabolic Panel [[id number]]  (Abnormal) Collected:  11/11/19 0519    Specimen:  Blood Updated:  11/11/19 0616     Glucose 356 mg/dL      BUN 24 mg/dL      Creatinine 1.53 mg/dL      Sodium 135 mmol/L      Potassium 5.0 mmol/L      Chloride 99 mmol/L      CO2 28.0 mmol/L      Calcium 8.9 mg/dL      eGFR Non African Amer 46 mL/min/1.73      BUN/Creatinine Ratio 15.7     Anion Gap 8.0 mmol/L     Narrative:       GFR Normal >60  Chronic Kidney Disease <60  Kidney Failure <15    C-reactive Protein [[id number]]  (Abnormal) Collected:  11/11/19 0519    Specimen:  Blood Updated:  11/11/19 0615     C-Reactive Protein 0.57 mg/dL     Sedimentation Rate [[id number]]  (Abnormal) Collected:  11/11/19 0520    Specimen:  Blood Updated:  11/11/19 0606     Sed Rate 36 mm/hr     CBC & Differential [[id number]] Collected:  11/11/19 0520    Specimen:  Blood Updated:  11/11/19 0542    Narrative:       The following orders were created for panel order CBC & Differential.  Procedure                               Abnormality         Status                     ---------                               -----------         ------                     CBC Auto Differential[[id number]]        Abnormal            Final result                  Please view results for these tests on the individual orders.    CBC Auto Differential [[id number]]  (Abnormal) Collected:  11/11/19 0520    Specimen:  Blood Updated:  11/11/19 0542     WBC 7.28 10*3/mm3      RBC 4.12 10*6/mm3      Hemoglobin 11.7 g/dL      Hematocrit 35.0 %      MCV 85.0 fL      MCH 28.4 pg      MCHC 33.4 g/dL      RDW 13.7 %      RDW-SD 42.5 fl      MPV 11.5 fL      Platelets 222 10*3/mm3      Neutrophil % 56.5 %      Lymphocyte % 21.8 %      Monocyte % 13.0 %      Eosinophil % 7.3 %      Basophil % 0.7 %      Immature Grans % 0.7 %      Neutrophils, Absolute 4.11 10*3/mm3      Lymphocytes, Absolute 1.59 10*3/mm3      Monocytes, Absolute 0.95 10*3/mm3      Eosinophils, Absolute 0.53 10*3/mm3      Basophils, Absolute 0.05 10*3/mm3      Immature Grans, Absolute 0.05 10*3/mm3      nRBC 0.0 /100 WBC     POC Glucose Once [[id number]]  (Abnormal) Collected:  11/10/19 1709    Specimen:  Blood Updated:  11/10/19 1720     Glucose 252 mg/dL      Comment: : [email or web address] Juan Antonio KristaMeter ID: [id number]       POC Glucose Once [[id number]]  (Abnormal) Collected:  11/10/19 1142    Specimen:  Blood Updated:  11/10/19 1204     Glucose 275 mg/dL      Comment: : [email or web address] Lokesh JennyMeter ID: [id number]             Radiology:   Imaging Results (Last 24 Hours)     ** No results found for the last 24 hours. **              Assessment     1.  Acute kidney injury due to ATN--stable  2.  Baseline chronic kidney disease stage 3  3.  Type 2 diabetes with nephropathy  4.  Essential hypertension  5.  Diabetic foot ulcer/right.  6.  Volume overload--improving  7.  Anemia   8.  Hyperkalemia- K level is adequate      Plan:  1.  Continue p.o. diuretics.  2.  Monitor renal function and potassium.  3.  Plan was discussed with his wife.       Brian Lomas MD  11/11/2019  12:02 PM

## 2019-11-11 NOTE — PROGRESS NOTES
[facility]  NIMESH Cortes M.D. Shauna Yadloski, APRN Meredith Crider, APRN      Internal Medicine Progress Note    11/11/2019   11:45 AM    Name:  Erick Luong  MRN:    [id number]     Acct:     [id number]   Room:  [address] [place] [address] Day: 0     Admit Date: 11/6/2019  4:54 PM  PCP: Del Shetty MD    Subjective:     C/C: weakness, need for continued wound care and iv antibiotics    Interval History: Status:  stayed the same. Up to chair.  No family at bedside. Tolerating treatment thus far. Pain well controlled. Progressing with therapy. Renal function stable. Blood sugars remain elevated. Counts otherwise stable. Renal function stable/improving. No new concerns.     Review of Systems   Constitution: Negative for chills, decreased appetite, weakness, malaise/fatigue, weight gain and weight loss.   HENT: Negative for congestion, ear discharge, hoarse voice and tinnitus.    Eyes: Negative for blurred vision, discharge, visual disturbance and visual halos.   Cardiovascular: Negative for chest pain, claudication, dyspnea on exertion, irregular heartbeat, leg swelling, orthopnea and paroxysmal nocturnal dyspnea.   Respiratory: Negative for cough, shortness of breath, sputum production and wheezing.    Endocrine: Negative for cold intolerance, heat intolerance and polyuria.   Hematologic/Lymphatic: Negative for adenopathy. Does not bruise/bleed easily.   Skin: Positive for poor wound healing. Negative for dry skin, itching and suspicious lesions.   Musculoskeletal: Negative for arthritis, back pain, falls, joint pain, muscle weakness and myalgias.   Gastrointestinal: Positive for constipation. Negative for abdominal pain, diarrhea, dysphagia and hematemesis.   Genitourinary: Negative for bladder incontinence, dysuria and frequency.   Neurological: Negative for aphonia, disturbances in coordination and dizziness.   Psychiatric/Behavioral: Negative for altered mental status, depression,  "memory loss and substance abuse. The patient does not have insomnia and is not nervous/anxious.        Medications:     Allergies:   Allergies   Allergen Reactions   • Bactrim [Sulfamethoxazole-Trimethoprim] Other (See Comments)     \"RENAL FAILURE\"   • Vancomycin Itching       Current Meds:   Current Facility-Administered Medications:   •  acetaminophen (TYLENOL) tablet 650 mg, 650 mg, Oral, Q4H PRN **OR** acetaminophen (TYLENOL) 160 MG/5ML solution 650 mg, 650 mg, Oral, Q4H PRN **OR** acetaminophen (TYLENOL) suppository 650 mg, 650 mg, Rectal, Q4H PRN, Juan Manuel Page MD  •  aspirin EC tablet 81 mg, 81 mg, Oral, Daily, Juan Manuel Page MD  •  bisacodyl (DULCOLAX) suppository 10 mg, 10 mg, Rectal, Daily PRN, Juan Manuel Page MD  •  bumetanide (BUMEX) tablet 0.5 mg, 0.5 mg, Oral, Daily, Juan Manuel Page MD  •  clindamycin (CLEOCIN) 900 mg in dextrose 5% 50 mL IVPB (premix), 900 mg, Intravenous, Q8H, Juan Manuel Page MD  •  dextrose (D50W) 25 g/ 50mL Intravenous Solution 25 g, 25 g, Intravenous, Q15 Min PRN, Juan Manuel Page MD  •  dextrose (GLUTOSE) oral gel 15 g, 15 g, Oral, Q15 Min PRN, Juan Manuel Page MD  •  diphenhydrAMINE (BENADRYL) injection 25 mg, 25 mg, Intravenous, Q6H PRN, Juan Manuel Page MD  •  docusate sodium (COLACE) capsule 100 mg, 100 mg, Oral, BID, Juan Manuel Page MD  •  DULoxetine (CYMBALTA) DR capsule 60 mg, 60 mg, Oral, Daily, Juan Manuel Page MD  •  enoxaparin (LOVENOX) syringe 40 mg, 40 mg, Subcutaneous, Q24H, Juan Manuel Page MD  •  gabapentin (NEURONTIN) capsule 100 mg, 100 mg, Oral, BID, Juan Manuel Page MD  •  glucagon (human recombinant) (GLUCAGEN DIAGNOSTIC) injection 1 mg, 1 mg, Subcutaneous, Q15 Min PRN, Juan Manuel Page MD  •  insulin detemir (LEVEMIR) injection 45 Units, 45 Units, Subcutaneous, Q12H, Juan Manuel Page MD  •  insulin lispro (humaLOG) injection 0-24 Units, 0-24 Units, Subcutaneous, 4x " Daily With Meals & Nightly, Juan Manuel Page MD  •  ipratropium-albuterol (DUO-NEB) nebulizer solution 3 mL, 3 mL, Nebulization, Q6H PRN, Juan Manuel Page MD  •  lactulose solution 20 g, 20 g, Oral, BID, Juan Manuel Page MD  •  latanoprost (XALATAN) 0.005 % ophthalmic solution 1 drop, 1 drop, Both Eyes, Nightly, Juan Manuel Page MD  •  magnesium hydroxide (MILK OF MAGNESIA) suspension 2400 mg/10mL 10 mL, 10 mL, Oral, Daily PRN, Juan Manuel Page MD  •  methylnaltrexone (RELISTOR) injection 8 mg, 8 mg, Subcutaneous, Every Other Day, Juan Manuel Page MD  •  nystatin (MYCOSTATIN) powder, , Topical, Q12H, Juan Manuel Page MD  •  ondansetron (ZOFRAN) injection 4 mg, 4 mg, Intravenous, Q4H PRN, Juan Manuel Page MD  •  oxyCODONE-acetaminophen (PERCOCET) 7.5-325 MG per tablet 1 tablet, 1 tablet, Oral, Q4H PRN, Juan Manuel Page MD  •  pantoprazole (PROTONIX) EC tablet 40 mg, 40 mg, Oral, Nightly, Juan Manuel Page MD  •  pneumococcal conj. 13-valent (PREVNAR-13) vaccine 0.5 mL, 0.5 mL, Intramuscular, Once, Juan Manuel Page MD  •  polyethylene glycol 3350 powder (packet), 17 g, Oral, BID, Juan Manuel Page MD  •  rosuvastatin (CRESTOR) tablet 40 mg, 40 mg, Oral, Nightly, Juan Manuel Page MD  •  saccharomyces boulardii (FLORASTOR) capsule 250 mg, 250 mg, Oral, BID, Juan Manuel Page MD  •  sodium chloride 0.9 % flush 10 mL, 10 mL, Intravenous, Q12H, Juan Manuel Page MD  •  sodium chloride 0.9 % flush 10 mL, 10 mL, Intravenous, PRN, Juan Manuel Page MD  •  sorbitol solution 30 mL, 30 mL, Oral, Q6H, Juan Manuel Page MD  •  traZODone (DESYREL) tablet 25 mg, 25 mg, Oral, Nightly PRN, Juan Manuel Page MD    Data:     Code Status:    There are no questions and answers to display.       Family History   Problem Relation Age of Onset   • Colon cancer Father    • Heart disease Father    • Colon cancer Sister    • Colon polyps Sister   "  • Alzheimer's disease Mother    • Coronary artery disease Sister    • Coronary artery disease Sister        Social History     Socioeconomic History   • Marital status:      Spouse name: Not on file   • Number of children: Not on file   • Years of education: Not on file   • Highest education level: Not on file   Tobacco Use   • Smoking status: Never Smoker   • Smokeless tobacco: Never Used   • Tobacco comment: smoked in highschool   Substance and Sexual Activity   • Alcohol use: No   • Drug use: No   • Sexual activity: Defer       Vitals:  Ht 182.9 cm (72\")   BMI 43.29 kg/m²   T  98.1 P 79 R 18 /72 Sp02 100% (room air)    I/O (24Hr):  No intake or output data in the 24 hours ending 11/11/19 1145    Labs and imaging:      Recent Results (from the past 12 hour(s))   Basic Metabolic Panel    Collection Time: 11/11/19  5:19 AM   Result Value Ref Range    Glucose 356 (H) 65 - 99 mg/dL    BUN 24 (H) 8 - 23 mg/dL    Creatinine 1.53 (H) 0.76 - 1.27 mg/dL    Sodium 135 (L) 136 - 145 mmol/L    Potassium 5.0 3.5 - 5.2 mmol/L    Chloride 99 98 - 107 mmol/L    CO2 28.0 22.0 - 29.0 mmol/L    Calcium 8.9 8.6 - 10.5 mg/dL    eGFR Non African Amer 46 (L) >60 mL/min/1.73    BUN/Creatinine Ratio 15.7 7.0 - 25.0    Anion Gap 8.0 5.0 - 15.0 mmol/L   C-reactive Protein    Collection Time: 11/11/19  5:19 AM   Result Value Ref Range    C-Reactive Protein 0.57 (H) 0.00 - 0.50 mg/dL   Sedimentation Rate    Collection Time: 11/11/19  5:20 AM   Result Value Ref Range    Sed Rate 36 (H) 0 - 15 mm/hr   CBC Auto Differential    Collection Time: 11/11/19  5:20 AM   Result Value Ref Range    WBC 7.28 3.40 - 10.80 10*3/mm3    RBC 4.12 (L) 4.14 - 5.80 10*6/mm3    Hemoglobin 11.7 (L) 13.0 - 17.7 g/dL    Hematocrit 35.0 (L) 37.5 - 51.0 %    MCV 85.0 79.0 - 97.0 fL    MCH 28.4 26.6 - 33.0 pg    MCHC 33.4 31.5 - 35.7 g/dL    RDW 13.7 12.3 - 15.4 %    RDW-SD 42.5 37.0 - 54.0 fl    MPV 11.5 6.0 - 12.0 fL    Platelets 222 140 - 450 10*3/mm3 "    Neutrophil % 56.5 42.7 - 76.0 %    Lymphocyte % 21.8 19.6 - 45.3 %    Monocyte % 13.0 (H) 5.0 - 12.0 %    Eosinophil % 7.3 (H) 0.3 - 6.2 %    Basophil % 0.7 0.0 - 1.5 %    Immature Grans % 0.7 (H) 0.0 - 0.5 %    Neutrophils, Absolute 4.11 1.70 - 7.00 10*3/mm3    Lymphocytes, Absolute 1.59 0.70 - 3.10 10*3/mm3    Monocytes, Absolute 0.95 (H) 0.10 - 0.90 10*3/mm3    Eosinophils, Absolute 0.53 (H) 0.00 - 0.40 10*3/mm3    Basophils, Absolute 0.05 0.00 - 0.20 10*3/mm3    Immature Grans, Absolute 0.05 0.00 - 0.05 10*3/mm3    nRBC 0.0 0.0 - 0.2 /100 WBC   POC Glucose Once    Collection Time: 11/11/19  7:40 AM   Result Value Ref Range    Glucose 296 (H) 70 - 130 mg/dL           Physical Examination:        Physical Exam   Constitutional: He is oriented to person, place, and time. He appears well-developed and well-nourished.   HENT:   Head: Normocephalic and atraumatic.   Nose: Nose normal.   Mouth/Throat: Oropharynx is clear and moist.   Eyes: EOM are normal. Pupils are equal, round, and reactive to light.   Neck: Normal range of motion. Neck supple.   Cardiovascular: Normal rate, regular rhythm, normal heart sounds and intact distal pulses.   Pulmonary/Chest: Effort normal and breath sounds normal.   Abdominal: Soft. Bowel sounds are normal.   obese   Musculoskeletal: Normal range of motion.   Generalized weakness   Neurological: He is alert and oriented to person, place, and time. He has normal reflexes.   Skin: Skin is warm and dry.   Dressing c.d.i   Psychiatric: He has a normal mood and affect. His behavior is normal.         Assessment:            * No active hospital problems. *    Past Medical History:   Diagnosis Date   • Arthritis    • CHF (congestive heart failure) (CMS/[facility])    • Coronary artery disease    • Diabetes mellitus (CMS/[facility])    • Hyperlipidemia    • Hypertension    • Myocardial infarction (CMS/[facility])    • Pancreatitis    • Renal disorder    • Sleep apnea with use of continuous positive airway  pressure (CPAP)         Plan:        1. Osteomyelitis right foot  2. Diabetic ulcer right foot  3. Poorly controlled DM2 with hyperglycemia on long term insulin  4. Morbid obesity  5. Acute kidney injury on CKD3  6. Constipation  7. Diabetic neuropathy  8. Chronic diastolic CHF  9. GERD  10. HLD  11. BRITNTI    Continue current treatment. Monitor counts. Increase activity. Labs Thursday. Continue antibiotics under direction of ID. Wound care per wound care team/Dr. Cerrato. Glycemic control - titrate insulin. Maintain patient safety. Monitor renal function. Continue laxatives.    Electronically signed by SUSAN Muñoz on 11/11/2019 at 11:45 AM     I have discussed the care of Erick Luong, including pertinent history and exam findings, with the nurse practitioner.    I have seen and examined the patient and the key elements of all parts of the encounter have been performed by me.  I agree with the assessment, plan and orders as documented by SUSAN Haney, after I modified the exam findings and the plan of treatments and the final version is my approved version of the assessment.        Electronically signed by Juan Manuel Page MD on 11/11/2019 at 10:29 PM

## 2019-11-12 ENCOUNTER — APPOINTMENT (OUTPATIENT)
Dept: LAB | Facility: HOSPITAL | Age: 64
End: 2019-11-12

## 2019-11-12 ENCOUNTER — APPOINTMENT (OUTPATIENT)
Dept: ONCOLOGY | Facility: HOSPITAL | Age: 64
End: 2019-11-12

## 2019-11-12 LAB
ANION GAP SERPL CALCULATED.3IONS-SCNC: 9 MMOL/L (ref 5–15)
BASOPHILS # BLD AUTO: 0.04 10*3/MM3 (ref 0–0.2)
BASOPHILS NFR BLD AUTO: 0.5 % (ref 0–1.5)
BUN BLD-MCNC: 27 MG/DL (ref 8–23)
BUN/CREAT SERPL: 16.7 (ref 7–25)
CALCIUM SPEC-SCNC: 8.9 MG/DL (ref 8.6–10.5)
CHLORIDE SERPL-SCNC: 99 MMOL/L (ref 98–107)
CO2 SERPL-SCNC: 27 MMOL/L (ref 22–29)
CREAT BLD-MCNC: 1.62 MG/DL (ref 0.76–1.27)
DEPRECATED RDW RBC AUTO: 41.3 FL (ref 37–54)
EOSINOPHIL # BLD AUTO: 0.54 10*3/MM3 (ref 0–0.4)
EOSINOPHIL NFR BLD AUTO: 7.1 % (ref 0.3–6.2)
ERYTHROCYTE [DISTWIDTH] IN BLOOD BY AUTOMATED COUNT: 13.7 % (ref 12.3–15.4)
GFR SERPL CREATININE-BSD FRML MDRD: 43 ML/MIN/1.73
GLUCOSE BLD-MCNC: 210 MG/DL (ref 65–99)
GLUCOSE BLDC GLUCOMTR-MCNC: 171 MG/DL (ref 70–130)
GLUCOSE BLDC GLUCOMTR-MCNC: 274 MG/DL (ref 70–130)
GLUCOSE BLDC GLUCOMTR-MCNC: 303 MG/DL (ref 70–130)
GLUCOSE BLDC GLUCOMTR-MCNC: 308 MG/DL (ref 70–130)
HCT VFR BLD AUTO: 33.3 % (ref 37.5–51)
HGB BLD-MCNC: 11.3 G/DL (ref 13–17.7)
IMM GRANULOCYTES # BLD AUTO: 0.03 10*3/MM3 (ref 0–0.05)
IMM GRANULOCYTES NFR BLD AUTO: 0.4 % (ref 0–0.5)
LYMPHOCYTES # BLD AUTO: 1.57 10*3/MM3 (ref 0.7–3.1)
LYMPHOCYTES NFR BLD AUTO: 20.5 % (ref 19.6–45.3)
MCH RBC QN AUTO: 28.1 PG (ref 26.6–33)
MCHC RBC AUTO-ENTMCNC: 33.9 G/DL (ref 31.5–35.7)
MCV RBC AUTO: 82.8 FL (ref 79–97)
MONOCYTES # BLD AUTO: 0.8 10*3/MM3 (ref 0.1–0.9)
MONOCYTES NFR BLD AUTO: 10.5 % (ref 5–12)
NEUTROPHILS # BLD AUTO: 4.66 10*3/MM3 (ref 1.7–7)
NEUTROPHILS NFR BLD AUTO: 61 % (ref 42.7–76)
NRBC BLD AUTO-RTO: 0 /100 WBC (ref 0–0.2)
PLATELET # BLD AUTO: 230 10*3/MM3 (ref 140–450)
PMV BLD AUTO: 11.1 FL (ref 6–12)
POTASSIUM BLD-SCNC: 4.3 MMOL/L (ref 3.5–5.2)
RBC # BLD AUTO: 4.02 10*6/MM3 (ref 4.14–5.8)
SODIUM BLD-SCNC: 135 MMOL/L (ref 136–145)
WBC NRBC COR # BLD: 7.64 10*3/MM3 (ref 3.4–10.8)

## 2019-11-12 PROCEDURE — 25010000002 METHYLNALTREXONE 12 MG/0.6ML SOLUTION: Performed by: INTERNAL MEDICINE

## 2019-11-12 PROCEDURE — 80048 BASIC METABOLIC PNL TOTAL CA: CPT | Performed by: INTERNAL MEDICINE

## 2019-11-12 PROCEDURE — 82962 GLUCOSE BLOOD TEST: CPT

## 2019-11-12 PROCEDURE — 63710000001 INSULIN DETEMIR PER 5 UNITS: Performed by: INTERNAL MEDICINE

## 2019-11-12 PROCEDURE — 63710000001 INSULIN LISPRO (HUMAN) PER 5 UNITS: Performed by: INTERNAL MEDICINE

## 2019-11-12 PROCEDURE — 85025 COMPLETE CBC W/AUTO DIFF WBC: CPT | Performed by: INTERNAL MEDICINE

## 2019-11-12 PROCEDURE — 25010000002 ENOXAPARIN PER 10 MG: Performed by: INTERNAL MEDICINE

## 2019-11-12 NOTE — PROGRESS NOTES
[facility]  NIMESH Cortes M.D.  SUSAN Haney APRN      Internal Medicine Progress Note    11/12/2019   11:21 AM    Name:  Erick Luong  MRN:    [id number]     Acct:     [id number]   Room:  [address] [place] [address] Day: 0     Admit Date: 11/6/2019  4:54 PM  PCP: Del Shetty MD    Subjective:     C/C: weakness, need for continued wound care and iv antibiotics    Interval History: Status:  stayed the same. Up to chair. No family at bedside. Tolerating treatment thus far. Pain well controlled. Progressing with therapy. Blood sugars remain elevated. Counts otherwise stable. Renal function stable. No new concerns.     Review of Systems   Constitution: Negative for chills, decreased appetite, weakness, malaise/fatigue, weight gain and weight loss.   HENT: Negative for congestion, ear discharge, hoarse voice and tinnitus.    Eyes: Negative for blurred vision, discharge, visual disturbance and visual halos.   Cardiovascular: Negative for chest pain, claudication, dyspnea on exertion, irregular heartbeat, leg swelling, orthopnea and paroxysmal nocturnal dyspnea.   Respiratory: Negative for cough, shortness of breath, sputum production and wheezing.    Endocrine: Negative for cold intolerance, heat intolerance and polyuria.   Hematologic/Lymphatic: Negative for adenopathy. Does not bruise/bleed easily.   Skin: Positive for poor wound healing. Negative for dry skin, itching and suspicious lesions.   Musculoskeletal: Negative for arthritis, back pain, falls, joint pain, muscle weakness and myalgias.   Gastrointestinal: Positive for constipation. Negative for abdominal pain, diarrhea, dysphagia and hematemesis.   Genitourinary: Negative for bladder incontinence, dysuria and frequency.   Neurological: Negative for aphonia, disturbances in coordination and dizziness.   Psychiatric/Behavioral: Negative for altered mental status, depression, memory loss and substance abuse. The  "patient does not have insomnia and is not nervous/anxious.        Medications:     Allergies:   Allergies   Allergen Reactions   • Bactrim [Sulfamethoxazole-Trimethoprim] Other (See Comments)     \"RENAL FAILURE\"   • Vancomycin Itching       Current Meds:   Current Facility-Administered Medications:   •  acetaminophen (TYLENOL) tablet 650 mg, 650 mg, Oral, Q4H PRN **OR** acetaminophen (TYLENOL) 160 MG/5ML solution 650 mg, 650 mg, Oral, Q4H PRN **OR** acetaminophen (TYLENOL) suppository 650 mg, 650 mg, Rectal, Q4H PRN, Juan Manuel Page MD  •  aspirin EC tablet 81 mg, 81 mg, Oral, Daily, Juan Manuel Page MD  •  bisacodyl (DULCOLAX) suppository 10 mg, 10 mg, Rectal, Daily PRN, Juan Manuel Page MD  •  bumetanide (BUMEX) tablet 0.5 mg, 0.5 mg, Oral, Daily, Juan Manuel Page MD  •  clindamycin (CLEOCIN) 900 mg in dextrose 5% 50 mL IVPB (premix), 900 mg, Intravenous, Q8H, Juan Manuel Page MD  •  dextrose (D50W) 25 g/ 50mL Intravenous Solution 25 g, 25 g, Intravenous, Q15 Min PRN, Juan Manuel Page MD  •  dextrose (GLUTOSE) oral gel 15 g, 15 g, Oral, Q15 Min PRN, Juan Manuel Page MD  •  diphenhydrAMINE (BENADRYL) injection 25 mg, 25 mg, Intravenous, Q6H PRN, Juan Manuel Page MD  •  docusate sodium (COLACE) capsule 100 mg, 100 mg, Oral, BID, Juan Manuel Page MD  •  DULoxetine (CYMBALTA) DR capsule 60 mg, 60 mg, Oral, Daily, Juan Manuel Page MD  •  enoxaparin (LOVENOX) syringe 40 mg, 40 mg, Subcutaneous, Q24H, Juan Manuel Page MD  •  gabapentin (NEURONTIN) capsule 100 mg, 100 mg, Oral, BID, Juan Manuel Page MD  •  glucagon (human recombinant) (GLUCAGEN DIAGNOSTIC) injection 1 mg, 1 mg, Subcutaneous, Q15 Min PRN, Juan Manuel Page MD  •  insulin detemir (LEVEMIR) injection 45 Units, 45 Units, Subcutaneous, Q12H, Juan Manuel Pgae MD  •  insulin lispro (humaLOG) injection 0-24 Units, 0-24 Units, Subcutaneous, 4x Daily With Meals & Nightly, Camilo, " Juan Manuel Dillon MD  •  ipratropium-albuterol (DUO-NEB) nebulizer solution 3 mL, 3 mL, Nebulization, Q6H PRN, Juan Manuel Page MD  •  lactulose solution 20 g, 20 g, Oral, BID, Juan Manuel Page MD  •  latanoprost (XALATAN) 0.005 % ophthalmic solution 1 drop, 1 drop, Both Eyes, Nightly, Juan Manuel Page MD  •  magnesium hydroxide (MILK OF MAGNESIA) suspension 2400 mg/10mL 10 mL, 10 mL, Oral, Daily PRN, Juan Manuel Page MD  •  methylnaltrexone (RELISTOR) injection 8 mg, 8 mg, Subcutaneous, Every Other Day, Juan Manuel Page MD  •  nystatin (MYCOSTATIN) powder, , Topical, Q12H, Juan Manuel Page MD  •  ondansetron (ZOFRAN) injection 4 mg, 4 mg, Intravenous, Q4H PRN, Juan Manuel Page MD  •  oxyCODONE-acetaminophen (PERCOCET) 7.5-325 MG per tablet 1 tablet, 1 tablet, Oral, Q4H PRN, Juan Manuel Page MD  •  pantoprazole (PROTONIX) EC tablet 40 mg, 40 mg, Oral, Nightly, Juan Manuel Page MD  •  pneumococcal conj. 13-valent (PREVNAR-13) vaccine 0.5 mL, 0.5 mL, Intramuscular, Once, Juan Manuel Page MD  •  polyethylene glycol 3350 powder (packet), 17 g, Oral, BID, Juan Manuel Page MD  •  rosuvastatin (CRESTOR) tablet 40 mg, 40 mg, Oral, Nightly, Juan Manuel Page MD  •  saccharomyces boulardii (FLORASTOR) capsule 250 mg, 250 mg, Oral, BID, Juan Manuel Page MD  •  sodium chloride 0.9 % flush 10 mL, 10 mL, Intravenous, Q12H, Juan Manuel Page MD  •  sodium chloride 0.9 % flush 10 mL, 10 mL, Intravenous, PRN, Juan Manuel Page MD  •  sorbitol solution 30 mL, 30 mL, Oral, Q6H, Juan Manuel Page MD  •  traZODone (DESYREL) tablet 25 mg, 25 mg, Oral, Nightly PRN, Juan Manuel Page MD    Data:     Code Status:    There are no questions and answers to display.       Family History   Problem Relation Age of Onset   • Colon cancer Father    • Heart disease Father    • Colon cancer Sister    • Colon polyps Sister    • Alzheimer's disease Mother    •  "Coronary artery disease Sister    • Coronary artery disease Sister        Social History     Socioeconomic History   • Marital status:      Spouse name: Not on file   • Number of children: Not on file   • Years of education: Not on file   • Highest education level: Not on file   Tobacco Use   • Smoking status: Never Smoker   • Smokeless tobacco: Never Used   • Tobacco comment: smoked in highschool   Substance and Sexual Activity   • Alcohol use: No   • Drug use: No   • Sexual activity: Defer       Vitals:  Ht 182.9 cm (72\")   BMI 43.29 kg/m²   T  97.2 P 78 R 18 /66 Sp02 97% (room air)    I/O (24Hr):  No intake or output data in the 24 hours ending 11/12/19 1121    Labs and imaging:      Recent Results (from the past 12 hour(s))   Basic Metabolic Panel    Collection Time: 11/12/19  4:35 AM   Result Value Ref Range    Glucose 210 (H) 65 - 99 mg/dL    BUN 27 (H) 8 - 23 mg/dL    Creatinine 1.62 (H) 0.76 - 1.27 mg/dL    Sodium 135 (L) 136 - 145 mmol/L    Potassium 4.3 3.5 - 5.2 mmol/L    Chloride 99 98 - 107 mmol/L    CO2 27.0 22.0 - 29.0 mmol/L    Calcium 8.9 8.6 - 10.5 mg/dL    eGFR Non African Amer 43 (L) >60 mL/min/1.73    BUN/Creatinine Ratio 16.7 7.0 - 25.0    Anion Gap 9.0 5.0 - 15.0 mmol/L   CBC Auto Differential    Collection Time: 11/12/19  4:35 AM   Result Value Ref Range    WBC 7.64 3.40 - 10.80 10*3/mm3    RBC 4.02 (L) 4.14 - 5.80 10*6/mm3    Hemoglobin 11.3 (L) 13.0 - 17.7 g/dL    Hematocrit 33.3 (L) 37.5 - 51.0 %    MCV 82.8 79.0 - 97.0 fL    MCH 28.1 26.6 - 33.0 pg    MCHC 33.9 31.5 - 35.7 g/dL    RDW 13.7 12.3 - 15.4 %    RDW-SD 41.3 37.0 - 54.0 fl    MPV 11.1 6.0 - 12.0 fL    Platelets 230 140 - 450 10*3/mm3    Neutrophil % 61.0 42.7 - 76.0 %    Lymphocyte % 20.5 19.6 - 45.3 %    Monocyte % 10.5 5.0 - 12.0 %    Eosinophil % 7.1 (H) 0.3 - 6.2 %    Basophil % 0.5 0.0 - 1.5 %    Immature Grans % 0.4 0.0 - 0.5 %    Neutrophils, Absolute 4.66 1.70 - 7.00 10*3/mm3    Lymphocytes, Absolute 1.57 " 0.70 - 3.10 10*3/mm3    Monocytes, Absolute 0.80 0.10 - 0.90 10*3/mm3    Eosinophils, Absolute 0.54 (H) 0.00 - 0.40 10*3/mm3    Basophils, Absolute 0.04 0.00 - 0.20 10*3/mm3    Immature Grans, Absolute 0.03 0.00 - 0.05 10*3/mm3    nRBC 0.0 0.0 - 0.2 /100 WBC   POC Glucose Once    Collection Time: 11/12/19  7:15 AM   Result Value Ref Range    Glucose 171 (H) 70 - 130 mg/dL           Physical Examination:        Physical Exam   Constitutional: He is oriented to person, place, and time. He appears well-developed and well-nourished.   HENT:   Head: Normocephalic and atraumatic.   Nose: Nose normal.   Mouth/Throat: Oropharynx is clear and moist.   Eyes: EOM are normal. Pupils are equal, round, and reactive to light.   Neck: Normal range of motion. Neck supple.   Cardiovascular: Normal rate, regular rhythm, normal heart sounds and intact distal pulses.   Pulmonary/Chest: Effort normal and breath sounds normal.   Abdominal: Soft. Bowel sounds are normal.   obese   Musculoskeletal: Normal range of motion.   Generalized weakness   Neurological: He is alert and oriented to person, place, and time. He has normal reflexes.   Skin: Skin is warm and dry.   Dressing c.d.i   Psychiatric: He has a normal mood and affect. His behavior is normal.   Nursing note and vitals reviewed.        Assessment:            * No active hospital problems. *    Past Medical History:   Diagnosis Date   • Arthritis    • CHF (congestive heart failure) (CMS/[facility])    • Coronary artery disease    • Diabetes mellitus (CMS/[facility])    • Hyperlipidemia    • Hypertension    • Myocardial infarction (CMS/[facility])    • Pancreatitis    • Renal disorder    • Sleep apnea with use of continuous positive airway pressure (CPAP)         Plan:        1. Osteomyelitis right foot  2. Diabetic ulcer right foot  3. Poorly controlled DM2 with hyperglycemia on long term insulin  4. Morbid obesity  5. Acute kidney injury on CKD3  6. Constipation  7. Diabetic neuropathy  8. Chronic  diastolic CHF  9. GERD  10. HLD  11. BRITTNI    Continue current treatment. Monitor counts. Increase activity. Labs Thursday. Continue antibiotics under direction of ID. Wound care per wound care team/Dr. Cerrato. Glycemic control - titrate insulin. Maintain patient safety. Monitor renal function. Continue laxatives.    Electronically signed by Juan Manuel Page MD on 11/12/2019 at 11:21 AM

## 2019-11-12 NOTE — PROGRESS NOTES
PROGRESS NOTE.      Patient:  Erick Luong  YOB: 1955  Date of Service: 11/12/2019  MRN: [id number]   Acct: [id number]   Primary Care Physician: Del Shetty MD  Advance Directive:   There are no questions and answers to display.     Admit Date: 11/6/2019       Hospital Day: 0  Referring Provider: Juan Manuel Page MD      Patient Seen, Chart, Consults, Notes, Labs, Radiology studies reviewed.    Chief complaint: Abnormal labs.    Subjective:  Erick Luong is a 64 y.o. male  whom we were consulted for acute kidney injury.  Baseline chronic kidney disease stage 3, baseline creatinine 1.3.  Has followed with our office on an irregular basis in the past.  History of type 2 diabetes, hypertension, congestive heart failure.  Undergoing treatment for chronic right foot wound; has PICC line in place and has been getting IV Vancomycin. He had an acute hospital stay at [facility], [place], operated by [facility] after he developed increasing edema, dyspnea and diminished urine output.  Hospital course remarkable for initiation of Bumex drip; good output since it was started. He had WANDER/ CKD stage 3 and hyperkalemia. He was stabilized and now transferred to LTAC for rehab. Renals service was called to monitor his electrolytes and follow his CKD stage 3. Patient has no dysuria.    This morning he is sitting up in the chair and feeling well.  He has no new complaints.      Review of Systems:  History obtained from chart review and the patient  General ROS: No fever or chills  Respiratory ROS: No cough, shortness of breath, wheezing  Cardiovascular ROS: no chest pain or dyspnea on exertion  Gastrointestinal ROS: No abdominal pain or melena  Genito-Urinary ROS: No dysuria or hematuria  Musculoskeletal: negative  Skin: negative    Objective:  Blood pressure: 128/66 mmHg  Heart rate is 79 bpm  O2 saturation 100%.      Physical examination:  HEENT: Normocephalic atraumatic head  Neck: Supple with no JVD.  General: awake/alert   Chest:  clear  to auscultation bilaterally without respiratory distress  CVS: regular rate and rhythm  Abdominal: soft, nontender, normal bowel sounds  Extremities: Trace leg edema  Skin: Right foot is covered with a dressing  Neuro: No focal motor deficits    Labs:  Lab Results (last 24 hours)     Procedure Component Value Units Date/Time    POC Glucose Once [[id number]]  (Abnormal) Collected:  11/12/19 1204    Specimen:  Blood Updated:  11/12/19 1221     Glucose 274 mg/dL      Comment: : CPITT2 Mellette CarolynMeter ID: [id number]       POC Glucose Once [[id number]]  (Abnormal) Collected:  11/12/19 0715    Specimen:  Blood Updated:  11/12/19 0831     Glucose 171 mg/dL      Comment: : CPITT2 Dima CarolynMeter ID: [id number]       Basic Metabolic Panel [[id number]]  (Abnormal) Collected:  11/12/19 0435    Specimen:  Blood Updated:  11/12/19 0520     Glucose 210 mg/dL      BUN 27 mg/dL      Creatinine 1.62 mg/dL      Sodium 135 mmol/L      Potassium 4.3 mmol/L      Chloride 99 mmol/L      CO2 27.0 mmol/L      Calcium 8.9 mg/dL      eGFR Non African Amer 43 mL/min/1.73      BUN/Creatinine Ratio 16.7     Anion Gap 9.0 mmol/L     Narrative:       GFR Normal >60  Chronic Kidney Disease <60  Kidney Failure <15    CBC & Differential [[id number]] Collected:  11/12/19 0435    Specimen:  Blood Updated:  11/12/19 0504    Narrative:       The following orders were created for panel order CBC & Differential.  Procedure                               Abnormality         Status                     ---------                               -----------         ------                     CBC Auto Differential[[id number]]        Abnormal            Final result                 Please view results for these tests on the individual orders.    CBC Auto Differential [[id number]]  (Abnormal) Collected:  11/12/19 0435    Specimen:  Blood Updated:  11/12/19 0504     WBC 7.64 10*3/mm3      RBC 4.02 10*6/mm3      Hemoglobin 11.3 g/dL      Hematocrit 33.3  %      MCV 82.8 fL      MCH 28.1 pg      MCHC 33.9 g/dL      RDW 13.7 %      RDW-SD 41.3 fl      MPV 11.1 fL      Platelets 230 10*3/mm3      Neutrophil % 61.0 %      Lymphocyte % 20.5 %      Monocyte % 10.5 %      Eosinophil % 7.1 %      Basophil % 0.5 %      Immature Grans % 0.4 %      Neutrophils, Absolute 4.66 10*3/mm3      Lymphocytes, Absolute 1.57 10*3/mm3      Monocytes, Absolute 0.80 10*3/mm3      Eosinophils, Absolute 0.54 10*3/mm3      Basophils, Absolute 0.04 10*3/mm3      Immature Grans, Absolute 0.03 10*3/mm3      nRBC 0.0 /100 WBC     POC Glucose Once [[id number]]  (Abnormal) Collected:  11/11/19 2248    Specimen:  Blood Updated:  11/11/19 2317     Glucose 260 mg/dL      Comment: : JAYLENE Tesfaye OksanaMeter ID: [id number]       POC Glucose Once [[id number]]  (Abnormal) Collected:  11/11/19 1717    Specimen:  Blood Updated:  11/11/19 1728     Glucose 169 mg/dL      Comment: : JOLYNN ReynoldsinMeter ID: [id number]             Radiology:   Imaging Results (Last 24 Hours)     ** No results found for the last 24 hours. **              Assessment     1.  Acute kidney injury due to ATN--stable  2.  Baseline chronic kidney disease stage 3  3.  Type 2 diabetes with nephropathy  4.  Essential hypertension  5.  Diabetic foot ulcer/right.  6.  Volume overload--improving  7.  Anemia   8.  Hyperkalemia- K level is adequate      Plan:  1.  Decrease diuretics.  2.  Monitor renal function and potassium.        Brian Lomas MD  11/12/2019  2:19 PM

## 2019-11-13 LAB
ANION GAP SERPL CALCULATED.3IONS-SCNC: 13 MMOL/L (ref 5–15)
BUN BLD-MCNC: 28 MG/DL (ref 8–23)
BUN/CREAT SERPL: 17.1 (ref 7–25)
CALCIUM SPEC-SCNC: 9.3 MG/DL (ref 8.6–10.5)
CHLORIDE SERPL-SCNC: 99 MMOL/L (ref 98–107)
CO2 SERPL-SCNC: 26 MMOL/L (ref 22–29)
CREAT BLD-MCNC: 1.64 MG/DL (ref 0.76–1.27)
GFR SERPL CREATININE-BSD FRML MDRD: 43 ML/MIN/1.73
GLUCOSE BLD-MCNC: 202 MG/DL (ref 65–99)
GLUCOSE BLDC GLUCOMTR-MCNC: 203 MG/DL (ref 70–130)
GLUCOSE BLDC GLUCOMTR-MCNC: 262 MG/DL (ref 70–130)
GLUCOSE BLDC GLUCOMTR-MCNC: 363 MG/DL (ref 70–130)
GLUCOSE BLDC GLUCOMTR-MCNC: 373 MG/DL (ref 70–130)
GLUCOSE BLDC GLUCOMTR-MCNC: 480 MG/DL (ref 70–130)
POTASSIUM BLD-SCNC: 4.4 MMOL/L (ref 3.5–5.2)
SODIUM BLD-SCNC: 138 MMOL/L (ref 136–145)

## 2019-11-13 PROCEDURE — 63710000001 INSULIN DETEMIR PER 5 UNITS: Performed by: NURSE PRACTITIONER

## 2019-11-13 PROCEDURE — 82962 GLUCOSE BLOOD TEST: CPT

## 2019-11-13 PROCEDURE — 25010000002 ENOXAPARIN PER 10 MG: Performed by: INTERNAL MEDICINE

## 2019-11-13 PROCEDURE — 80048 BASIC METABOLIC PNL TOTAL CA: CPT | Performed by: INTERNAL MEDICINE

## 2019-11-13 PROCEDURE — 63710000001 INSULIN DETEMIR PER 5 UNITS: Performed by: INTERNAL MEDICINE

## 2019-11-13 PROCEDURE — 63710000001 INSULIN LISPRO (HUMAN) PER 5 UNITS: Performed by: INTERNAL MEDICINE

## 2019-11-13 RX ORDER — SORBITOL SOLUTION 70 %
30 SOLUTION, ORAL MISCELLANEOUS EVERY 6 HOURS PRN
Status: DISCONTINUED | OUTPATIENT
Start: 2019-11-13 | End: 2019-11-19 | Stop reason: HOSPADM

## 2019-11-13 RX ORDER — PETROLATUM,WHITE/LANOLIN
OINTMENT (GRAM) TOPICAL DAILY
Status: DISCONTINUED | OUTPATIENT
Start: 2019-11-13 | End: 2019-11-19 | Stop reason: HOSPADM

## 2019-11-13 NOTE — PROGRESS NOTES
PROGRESS NOTE.      Patient:  Erick Luong  YOB: 1955  Date of Service: 11/13/2019  MRN: [id number]   Acct: [id number]   Primary Care Physician: Del Shetty MD  Advance Directive:   There are no questions and answers to display.     Admit Date: 11/6/2019       Hospital Day: 0  Referring Provider: Juan Manuel Page MD      Patient Seen, Chart, Consults, Notes, Labs, Radiology studies reviewed.    Chief complaint: Abnormal labs.    Subjective:  Erick Luong is a 64 y.o. male  whom we were consulted for acute kidney injury.  Baseline chronic kidney disease stage 3, baseline creatinine 1.3.  Has followed with our office on an irregular basis in the past.  History of type 2 diabetes, hypertension, congestive heart failure.  Undergoing treatment for chronic right foot wound; has PICC line in place and has been getting IV Vancomycin. He had an acute hospital stay at [facility] after he developed increasing edema, dyspnea and diminished urine output.  Hospital course remarkable for initiation of Bumex drip; good output since it was started. He had WANDER/ CKD stage 3 and hyperkalemia. He was stabilized and now transferred to LTAC for rehab. Renals service was called to monitor his electrolytes and follow his CKD stage 3. Patient has no dysuria.    He has no new complaint.  He is currently receiving IV antibiotics.      Review of Systems:  History obtained from chart review and the patient  General ROS: No fever or chills  Respiratory ROS: No cough, shortness of breath, wheezing  Cardiovascular ROS: no chest pain or dyspnea on exertion  Gastrointestinal ROS: No abdominal pain or melena  Genito-Urinary ROS: No dysuria or hematuria  Musculoskeletal: negative  Skin: negative    Objective:  Blood pressure: 130/70 mmHg.  Heart rate is 79 bpm  O2 saturation 100%.      Physical examination:  HEENT: Normocephalic atraumatic head  Neck: Supple with no JVD.  General: awake/alert   Chest:  clear to auscultation  bilaterally without respiratory distress  CVS: regular rate and rhythm  Abdominal: soft, nontender, normal bowel sounds  Extremities: Trace leg edema  Skin: Right foot is covered with a dressing  Neuro: No focal motor deficits    Labs:  Lab Results (last 24 hours)     Procedure Component Value Units Date/Time    POC Glucose Once [[id number]]  (Abnormal) Collected:  11/13/19 0754    Specimen:  Blood Updated:  11/13/19 0805     Glucose 203 mg/dL      Comment: : [id number] Quarles EmilyMeter ID: [id number]       Basic Metabolic Panel [[id number]]  (Abnormal) Collected:  11/13/19 0437    Specimen:  Blood Updated:  11/13/19 0511     Glucose 202 mg/dL      BUN 28 mg/dL      Creatinine 1.64 mg/dL      Sodium 138 mmol/L      Potassium 4.4 mmol/L      Chloride 99 mmol/L      CO2 26.0 mmol/L      Calcium 9.3 mg/dL      eGFR Non African Amer 43 mL/min/1.73      BUN/Creatinine Ratio 17.1     Anion Gap 13.0 mmol/L     Narrative:       GFR Normal >60  Chronic Kidney Disease <60  Kidney Failure <15    POC Glucose Once [[id number]]  (Abnormal) Collected:  11/12/19 2154    Specimen:  Blood Updated:  11/12/19 2215     Glucose 303 mg/dL      Comment: : [id number] Jones StaceyMeter ID: [id number]       POC Glucose Once [[id number]]  (Abnormal) Collected:  11/12/19 1714    Specimen:  Blood Updated:  11/12/19 1725     Glucose 308 mg/dL      Comment: : CPJESSEE Ch CarolynMeter ID: [id number]       POC Glucose Once [[id number]]  (Abnormal) Collected:  11/12/19 1204    Specimen:  Blood Updated:  11/12/19 1221     Glucose 274 mg/dL      Comment: : CAITLYN Dima CarolynMeter ID: [id number]             Radiology:   Imaging Results (Last 24 Hours)     ** No results found for the last 24 hours. **              Assessment     1.  Acute kidney injury due to ATN--stable  2.  Baseline chronic kidney disease stage 3  3.  Type 2 diabetes with nephropathy  4.  Essential hypertension  5.  Diabetic foot ulcer/right.  6.  Volume  overload--improving  7.  Anemia   8.  Hyperkalemia- K level is adequate      Plan:  1.  P.o. diuretics.  2.  IV antibiotics.        Brian Lomas MD  11/13/2019  10:37 AM

## 2019-11-13 NOTE — PROGRESS NOTES
[facility]  NIMESH Cortes M.D. Shauna Yadloski, APRN Meredith Crider, APRN      Internal Medicine Progress Note    11/13/2019   10:36 AM    Name:  Erick Luong  MRN:    [id number]     Acct:     [id number]   Room:  [address] [place] [address] Day: 0     Admit Date: 11/6/2019  4:54 PM  PCP: Del Shetty MD    Subjective:     C/C: weakness, need for continued wound care and iv antibiotics    Interval History: Status:  stayed the same. Resting in bed. No family at bedside. Tolerating treatment thus far. Pain well controlled. Progressing with therapy. Blood sugars remain elevated. Counts otherwise stable. Renal function stable. No new concerns.     Review of Systems   Constitution: Negative for chills, decreased appetite, weakness, malaise/fatigue, weight gain and weight loss.   HENT: Negative for congestion, ear discharge, hoarse voice and tinnitus.    Eyes: Negative for blurred vision, discharge, visual disturbance and visual halos.   Cardiovascular: Negative for chest pain, claudication, dyspnea on exertion, irregular heartbeat, leg swelling, orthopnea and paroxysmal nocturnal dyspnea.   Respiratory: Negative for cough, shortness of breath, sputum production and wheezing.    Endocrine: Negative for cold intolerance, heat intolerance and polyuria.   Hematologic/Lymphatic: Negative for adenopathy. Does not bruise/bleed easily.   Skin: Positive for poor wound healing. Negative for dry skin, itching and suspicious lesions.   Musculoskeletal: Negative for arthritis, back pain, falls, joint pain, muscle weakness and myalgias.   Gastrointestinal: Positive for constipation. Negative for abdominal pain, diarrhea, dysphagia and hematemesis.   Genitourinary: Negative for bladder incontinence, dysuria and frequency.   Neurological: Negative for aphonia, disturbances in coordination and dizziness.   Psychiatric/Behavioral: Negative for altered mental status, depression, memory loss and substance abuse.  "The patient does not have insomnia and is not nervous/anxious.        Medications:     Allergies:   Allergies   Allergen Reactions   • Bactrim [Sulfamethoxazole-Trimethoprim] Other (See Comments)     \"RENAL FAILURE\"   • Vancomycin Itching       Current Meds:   Current Facility-Administered Medications:   •  acetaminophen (TYLENOL) tablet 650 mg, 650 mg, Oral, Q4H PRN **OR** acetaminophen (TYLENOL) 160 MG/5ML solution 650 mg, 650 mg, Oral, Q4H PRN **OR** acetaminophen (TYLENOL) suppository 650 mg, 650 mg, Rectal, Q4H PRN, Juan Manuel Page MD  •  aspirin EC tablet 81 mg, 81 mg, Oral, Daily, Juan Manuel Page MD  •  bisacodyl (DULCOLAX) suppository 10 mg, 10 mg, Rectal, Daily PRN, Juan Manuel Page MD  •  bumetanide (BUMEX) tablet 0.5 mg, 0.5 mg, Oral, Daily, Juan Manuel Page MD  •  clindamycin (CLEOCIN) 900 mg in dextrose 5% 50 mL IVPB (premix), 900 mg, Intravenous, Q8H, Juan Manuel Page MD  •  dextrose (D50W) 25 g/ 50mL Intravenous Solution 25 g, 25 g, Intravenous, Q15 Min PRN, Juan Manuel Page MD  •  dextrose (GLUTOSE) oral gel 15 g, 15 g, Oral, Q15 Min PRN, Juan Manuel Page MD  •  diphenhydrAMINE (BENADRYL) injection 25 mg, 25 mg, Intravenous, Q6H PRN, Juan Manuel Page MD  •  docusate sodium (COLACE) capsule 100 mg, 100 mg, Oral, BID, Juan Manuel Page MD  •  DULoxetine (CYMBALTA) DR capsule 60 mg, 60 mg, Oral, Daily, Juan Manuel Page MD  •  enoxaparin (LOVENOX) syringe 40 mg, 40 mg, Subcutaneous, Q24H, Juan Manuel Page MD  •  gabapentin (NEURONTIN) capsule 100 mg, 100 mg, Oral, BID, Juan Manuel Page MD  •  glucagon (human recombinant) (GLUCAGEN DIAGNOSTIC) injection 1 mg, 1 mg, Subcutaneous, Q15 Min PRN, Juan Manuel Page MD  •  insulin detemir (LEVEMIR) injection 50 Units, 50 Units, Subcutaneous, Q12H, Juan Manuel Page MD  •  insulin lispro (humaLOG) injection 0-24 Units, 0-24 Units, Subcutaneous, 4x Daily With Meals & Nightly, " Juan Manuel Page MD  •  ipratropium-albuterol (DUO-NEB) nebulizer solution 3 mL, 3 mL, Nebulization, Q6H PRN, Juan Manuel Page MD  •  lactulose solution 20 g, 20 g, Oral, BID, Juan Manuel Page MD  •  latanoprost (XALATAN) 0.005 % ophthalmic solution 1 drop, 1 drop, Both Eyes, Nightly, Juan Manuel Page MD  •  magnesium hydroxide (MILK OF MAGNESIA) suspension 2400 mg/10mL 10 mL, 10 mL, Oral, Daily PRN, Juan Manuel Page MD  •  methylnaltrexone (RELISTOR) injection 8 mg, 8 mg, Subcutaneous, Every Other Day, Juan Manuel Page MD  •  nystatin (MYCOSTATIN) powder, , Topical, Q12H, Juan Manuel Page MD  •  ondansetron (ZOFRAN) injection 4 mg, 4 mg, Intravenous, Q4H PRN, Juan Manuel Page MD  •  oxyCODONE-acetaminophen (PERCOCET) 7.5-325 MG per tablet 1 tablet, 1 tablet, Oral, Q4H PRN, Juan Manuel Page MD  •  pantoprazole (PROTONIX) EC tablet 40 mg, 40 mg, Oral, Nightly, Juan Manuel Page MD  •  pneumococcal conj. 13-valent (PREVNAR-13) vaccine 0.5 mL, 0.5 mL, Intramuscular, Once, Juan Manuel Page MD  •  polyethylene glycol 3350 powder (packet), 17 g, Oral, BID, Juan Manuel Page MD  •  rosuvastatin (CRESTOR) tablet 40 mg, 40 mg, Oral, Nightly, Juan Manuel Page MD  •  saccharomyces boulardii (FLORASTOR) capsule 250 mg, 250 mg, Oral, BID, Juan Manuel Page MD  •  sodium chloride 0.9 % flush 10 mL, 10 mL, Intravenous, Q12H, Juan Manuel Page MD  •  sodium chloride 0.9 % flush 10 mL, 10 mL, Intravenous, PRN, Juan Manuel Page MD  •  sorbitol solution 30 mL, 30 mL, Oral, Q6H, Juan Manuel Page MD  •  traZODone (DESYREL) tablet 25 mg, 25 mg, Oral, Nightly PRN, Juan Manuel Page MD    Data:     Code Status:    There are no questions and answers to display.       Family History   Problem Relation Age of Onset   • Colon cancer Father    • Heart disease Father    • Colon cancer Sister    • Colon polyps Sister    • Alzheimer's disease  "Mother    • Coronary artery disease Sister    • Coronary artery disease Sister        Social History     Socioeconomic History   • Marital status:      Spouse name: Not on file   • Number of children: Not on file   • Years of education: Not on file   • Highest education level: Not on file   Tobacco Use   • Smoking status: Never Smoker   • Smokeless tobacco: Never Used   • Tobacco comment: smoked in highschool   Substance and Sexual Activity   • Alcohol use: No   • Drug use: No   • Sexual activity: Defer       Vitals:  Ht 182.9 cm (72\")   BMI 43.29 kg/m²   T  97.8 P 78 R 18 /60 Sp02 99% (room air)    I/O (24Hr):  No intake or output data in the 24 hours ending 11/13/19 1036    Labs and imaging:      Recent Results (from the past 12 hour(s))   Basic Metabolic Panel    Collection Time: 11/13/19  4:37 AM   Result Value Ref Range    Glucose 202 (H) 65 - 99 mg/dL    BUN 28 (H) 8 - 23 mg/dL    Creatinine 1.64 (H) 0.76 - 1.27 mg/dL    Sodium 138 136 - 145 mmol/L    Potassium 4.4 3.5 - 5.2 mmol/L    Chloride 99 98 - 107 mmol/L    CO2 26.0 22.0 - 29.0 mmol/L    Calcium 9.3 8.6 - 10.5 mg/dL    eGFR Non African Amer 43 (L) >60 mL/min/1.73    BUN/Creatinine Ratio 17.1 7.0 - 25.0    Anion Gap 13.0 5.0 - 15.0 mmol/L   POC Glucose Once    Collection Time: 11/13/19  7:54 AM   Result Value Ref Range    Glucose 203 (H) 70 - 130 mg/dL           Physical Examination:        Physical Exam   Constitutional: He is oriented to person, place, and time. He appears well-developed and well-nourished.   HENT:   Head: Normocephalic and atraumatic.   Nose: Nose normal.   Mouth/Throat: Oropharynx is clear and moist.   Eyes: EOM are normal. Pupils are equal, round, and reactive to light.   Neck: Normal range of motion. Neck supple.   Cardiovascular: Normal rate, regular rhythm, normal heart sounds and intact distal pulses.   Pulmonary/Chest: Effort normal and breath sounds normal.   Abdominal: Soft. Bowel sounds are normal.   obese "   Musculoskeletal: Normal range of motion.   Generalized weakness   Neurological: He is alert and oriented to person, place, and time. He has normal reflexes.   Skin: Skin is warm and dry.   Dressing c.d.i   Psychiatric: He has a normal mood and affect. His behavior is normal.   Nursing note and vitals reviewed.        Assessment:            * No active hospital problems. *    Past Medical History:   Diagnosis Date   • Arthritis    • CHF (congestive heart failure) (CMS/[facility])    • Coronary artery disease    • Diabetes mellitus (CMS/[facility])    • Hyperlipidemia    • Hypertension    • Myocardial infarction (CMS/[facility])    • Pancreatitis    • Renal disorder    • Sleep apnea with use of continuous positive airway pressure (CPAP)         Plan:        1. Osteomyelitis right foot  2. Diabetic ulcer right foot  3. Poorly controlled DM2 with hyperglycemia on long term insulin  4. Morbid obesity  5. Acute kidney injury on CKD3  6. Constipation  7. Diabetic neuropathy  8. Chronic diastolic CHF  9. GERD  10. HLD  11. BRITTNI    Continue current treatment. Monitor counts. Increase activity. Labs in am. Continue antibiotics under direction of ID. Wound care per wound care team/Dr. Cerrato. Glycemic control - titrate insulin. Maintain patient safety. Monitor renal function. Continue laxatives.    Electronically signed by SUSAN Muñoz on 11/13/2019 at 10:36 AM

## 2019-11-14 LAB
ANION GAP SERPL CALCULATED.3IONS-SCNC: 12 MMOL/L (ref 5–15)
BASOPHILS # BLD AUTO: 0.04 10*3/MM3 (ref 0–0.2)
BASOPHILS NFR BLD AUTO: 0.5 % (ref 0–1.5)
BUN BLD-MCNC: 27 MG/DL (ref 8–23)
BUN/CREAT SERPL: 18.4 (ref 7–25)
CALCIUM SPEC-SCNC: 9.1 MG/DL (ref 8.6–10.5)
CHLORIDE SERPL-SCNC: 100 MMOL/L (ref 98–107)
CO2 SERPL-SCNC: 27 MMOL/L (ref 22–29)
CREAT BLD-MCNC: 1.47 MG/DL (ref 0.76–1.27)
CRP SERPL-MCNC: 0.47 MG/DL (ref 0–0.5)
DEPRECATED RDW RBC AUTO: 44 FL (ref 37–54)
EOSINOPHIL # BLD AUTO: 0.68 10*3/MM3 (ref 0–0.4)
EOSINOPHIL NFR BLD AUTO: 8.4 % (ref 0.3–6.2)
ERYTHROCYTE [DISTWIDTH] IN BLOOD BY AUTOMATED COUNT: 14.3 % (ref 12.3–15.4)
ERYTHROCYTE [SEDIMENTATION RATE] IN BLOOD: 28 MM/HR (ref 0–15)
GFR SERPL CREATININE-BSD FRML MDRD: 48 ML/MIN/1.73
GLUCOSE BLD-MCNC: 123 MG/DL (ref 65–99)
GLUCOSE BLDC GLUCOMTR-MCNC: 103 MG/DL (ref 70–130)
GLUCOSE BLDC GLUCOMTR-MCNC: 161 MG/DL (ref 70–130)
GLUCOSE BLDC GLUCOMTR-MCNC: 374 MG/DL (ref 70–130)
GLUCOSE BLDC GLUCOMTR-MCNC: 418 MG/DL (ref 70–130)
GLUCOSE BLDC GLUCOMTR-MCNC: 429 MG/DL (ref 70–130)
HCT VFR BLD AUTO: 35.4 % (ref 37.5–51)
HGB BLD-MCNC: 11.8 G/DL (ref 13–17.7)
IMM GRANULOCYTES # BLD AUTO: 0.03 10*3/MM3 (ref 0–0.05)
IMM GRANULOCYTES NFR BLD AUTO: 0.4 % (ref 0–0.5)
LYMPHOCYTES # BLD AUTO: 1.43 10*3/MM3 (ref 0.7–3.1)
LYMPHOCYTES NFR BLD AUTO: 17.7 % (ref 19.6–45.3)
MCH RBC QN AUTO: 28.4 PG (ref 26.6–33)
MCHC RBC AUTO-ENTMCNC: 33.3 G/DL (ref 31.5–35.7)
MCV RBC AUTO: 85.1 FL (ref 79–97)
MONOCYTES # BLD AUTO: 0.73 10*3/MM3 (ref 0.1–0.9)
MONOCYTES NFR BLD AUTO: 9.1 % (ref 5–12)
NEUTROPHILS # BLD AUTO: 5.15 10*3/MM3 (ref 1.7–7)
NEUTROPHILS NFR BLD AUTO: 63.9 % (ref 42.7–76)
NRBC BLD AUTO-RTO: 0 /100 WBC (ref 0–0.2)
NT-PROBNP SERPL-MCNC: 168.7 PG/ML (ref 5–900)
PLATELET # BLD AUTO: 220 10*3/MM3 (ref 140–450)
PMV BLD AUTO: 11 FL (ref 6–12)
POTASSIUM BLD-SCNC: 4.6 MMOL/L (ref 3.5–5.2)
RBC # BLD AUTO: 4.16 10*6/MM3 (ref 4.14–5.8)
SODIUM BLD-SCNC: 139 MMOL/L (ref 136–145)
WBC NRBC COR # BLD: 8.06 10*3/MM3 (ref 3.4–10.8)

## 2019-11-14 PROCEDURE — 25010000002 ENOXAPARIN PER 10 MG: Performed by: INTERNAL MEDICINE

## 2019-11-14 PROCEDURE — 80048 BASIC METABOLIC PNL TOTAL CA: CPT | Performed by: INTERNAL MEDICINE

## 2019-11-14 PROCEDURE — 85651 RBC SED RATE NONAUTOMATED: CPT | Performed by: INTERNAL MEDICINE

## 2019-11-14 PROCEDURE — 63710000001 INSULIN LISPRO (HUMAN) PER 5 UNITS: Performed by: INTERNAL MEDICINE

## 2019-11-14 PROCEDURE — 25010000002 METHYLNALTREXONE 12 MG/0.6ML SOLUTION: Performed by: INTERNAL MEDICINE

## 2019-11-14 PROCEDURE — 83880 ASSAY OF NATRIURETIC PEPTIDE: CPT | Performed by: INTERNAL MEDICINE

## 2019-11-14 PROCEDURE — 86140 C-REACTIVE PROTEIN: CPT | Performed by: INTERNAL MEDICINE

## 2019-11-14 PROCEDURE — 82962 GLUCOSE BLOOD TEST: CPT

## 2019-11-14 PROCEDURE — 85025 COMPLETE CBC W/AUTO DIFF WBC: CPT | Performed by: INTERNAL MEDICINE

## 2019-11-14 PROCEDURE — 63710000001 INSULIN DETEMIR PER 5 UNITS: Performed by: NURSE PRACTITIONER

## 2019-11-14 NOTE — PROGRESS NOTES
[facility]  NIMESH Cortes M.D.  SUSAN Haney APRN      Internal Medicine Progress Note    11/14/2019   2:36 PM    Name:  Erick Luong  MRN:    [id number]     Acct:     [id number]   Room:  [address] [place] [address] Day: 0     Admit Date: 11/6/2019  4:54 PM  PCP: eDl Shetty MD    Subjective:     C/C: weakness, need for continued wound care and iv antibiotics    Interval History: Status:  stayed the same. Up to chair. No family at bedside. Tolerating treatment thus far. Pain well controlled. Progressing with therapy. Blood sugars significantly elevated yesterday. Have ranged 103-161 today.  Counts otherwise stable. Renal function stable/improving. No new concerns.     Review of Systems   Constitution: Negative for chills, decreased appetite, weakness, malaise/fatigue, weight gain and weight loss.   HENT: Negative for congestion, ear discharge, hoarse voice and tinnitus.    Eyes: Negative for blurred vision, discharge, visual disturbance and visual halos.   Cardiovascular: Negative for chest pain, claudication, dyspnea on exertion, irregular heartbeat, leg swelling, orthopnea and paroxysmal nocturnal dyspnea.   Respiratory: Negative for cough, shortness of breath, sputum production and wheezing.    Endocrine: Negative for cold intolerance, heat intolerance and polyuria.   Hematologic/Lymphatic: Negative for adenopathy. Does not bruise/bleed easily.   Skin: Positive for poor wound healing. Negative for dry skin, itching and suspicious lesions.   Musculoskeletal: Negative for arthritis, back pain, falls, joint pain, muscle weakness and myalgias.   Gastrointestinal: Positive for constipation. Negative for abdominal pain, diarrhea, dysphagia and hematemesis.   Genitourinary: Negative for bladder incontinence, dysuria and frequency.   Neurological: Negative for aphonia, disturbances in coordination and dizziness.   Psychiatric/Behavioral: Negative for altered mental  "status, depression, memory loss and substance abuse. The patient does not have insomnia and is not nervous/anxious.        Medications:     Allergies:   Allergies   Allergen Reactions   • Bactrim [Sulfamethoxazole-Trimethoprim] Other (See Comments)     \"RENAL FAILURE\"   • Vancomycin Itching       Current Meds:   Current Facility-Administered Medications:   •  acetaminophen (TYLENOL) tablet 650 mg, 650 mg, Oral, Q4H PRN **OR** acetaminophen (TYLENOL) 160 MG/5ML solution 650 mg, 650 mg, Oral, Q4H PRN **OR** acetaminophen (TYLENOL) suppository 650 mg, 650 mg, Rectal, Q4H PRN, Juan Manuel Page MD  •  aspirin EC tablet 81 mg, 81 mg, Oral, Daily, Juan Manuel Page MD  •  bisacodyl (DULCOLAX) suppository 10 mg, 10 mg, Rectal, Daily PRN, Juan Manuel Page MD  •  bumetanide (BUMEX) tablet 0.5 mg, 0.5 mg, Oral, Daily, Juan Manuel Page MD  •  clindamycin (CLEOCIN) 900 mg in dextrose 5% 50 mL IVPB (premix), 900 mg, Intravenous, Q8H, Juan Manuel Page MD  •  dextrose (D50W) 25 g/ 50mL Intravenous Solution 25 g, 25 g, Intravenous, Q15 Min PRN, Juan Manuel Page MD  •  dextrose (GLUTOSE) oral gel 15 g, 15 g, Oral, Q15 Min PRN, Juan Manuel Page MD  •  diphenhydrAMINE (BENADRYL) injection 25 mg, 25 mg, Intravenous, Q6H PRN, Juan Manuel Page MD  •  docusate sodium (COLACE) capsule 100 mg, 100 mg, Oral, BID, Juan Manuel Page MD  •  DULoxetine (CYMBALTA) DR capsule 60 mg, 60 mg, Oral, Daily, Juan Manuel Page MD  •  enoxaparin (LOVENOX) syringe 40 mg, 40 mg, Subcutaneous, Q24H, Juan Manuel Page MD  •  gabapentin (NEURONTIN) capsule 100 mg, 100 mg, Oral, BID, Juan Manuel Page MD  •  glucagon (human recombinant) (GLUCAGEN DIAGNOSTIC) injection 1 mg, 1 mg, Subcutaneous, Q15 Min PRN, Juan Manuel Page MD  •  insulin detemir (LEVEMIR) injection 55 Units, 55 Units, Subcutaneous, Q12H, Shruthi Isabel APRN  •  insulin lispro (humaLOG) injection 0-24 Units, 0-24 " Units, Subcutaneous, 4x Daily With Meals & Nightly, Juan Manuel Page MD  •  ipratropium-albuterol (DUO-NEB) nebulizer solution 3 mL, 3 mL, Nebulization, Q6H PRN, Juan Manuel Page MD  •  lactulose solution 20 g, 20 g, Oral, BID, Juan Manuel Page MD  •  latanoprost (XALATAN) 0.005 % ophthalmic solution 1 drop, 1 drop, Both Eyes, Nightly, Juan Manuel Page MD  •  magnesium hydroxide (MILK OF MAGNESIA) suspension 2400 mg/10mL 10 mL, 10 mL, Oral, Daily PRN, Juan Manuel Page MD  •  methylnaltrexone (RELISTOR) injection 8 mg, 8 mg, Subcutaneous, Every Other Day, Juan Manuel Page MD  •  nystatin (MYCOSTATIN) powder, , Topical, Q12H, Juan Manuel Page MD  •  ondansetron (ZOFRAN) injection 4 mg, 4 mg, Intravenous, Q4H PRN, Juan Manuel Page MD  •  oxyCODONE-acetaminophen (PERCOCET) 7.5-325 MG per tablet 1 tablet, 1 tablet, Oral, Q4H PRN, Juan Manuel Page MD  •  pantoprazole (PROTONIX) EC tablet 40 mg, 40 mg, Oral, Nightly, Juan Manuel Page MD  •  pneumococcal conj. 13-valent (PREVNAR-13) vaccine 0.5 mL, 0.5 mL, Intramuscular, Once, Juan Manuel Page MD  •  polyethylene glycol 3350 powder (packet), 17 g, Oral, BID, Juan Manuel Page MD  •  rosuvastatin (CRESTOR) tablet 40 mg, 40 mg, Oral, Nightly, Juan Manuel Page MD  •  saccharomyces boulardii (FLORASTOR) capsule 250 mg, 250 mg, Oral, BID, Juan Manuel Page MD  •  sodium chloride 0.9 % flush 10 mL, 10 mL, Intravenous, Q12H, Juan Manuel Page MD  •  sodium chloride 0.9 % flush 10 mL, 10 mL, Intravenous, PRN, Juan Manuel Page MD  •  sorbitol solution 30 mL, 30 mL, Oral, Q6H PRN, Juan Manuel Page MD  •  traZODone (DESYREL) tablet 25 mg, 25 mg, Oral, Nightly PRN, Juan Manuel Page MD  •  vitamin A & D ointment, , Topical, Daily, Ivonne Staples, APRN    Data:     Code Status:    There are no questions and answers to display.       Family History   Problem Relation Age of Onset  "  • Colon cancer Father    • Heart disease Father    • Colon cancer Sister    • Colon polyps Sister    • Alzheimer's disease Mother    • Coronary artery disease Sister    • Coronary artery disease Sister        Social History     Socioeconomic History   • Marital status:      Spouse name: Not on file   • Number of children: Not on file   • Years of education: Not on file   • Highest education level: Not on file   Tobacco Use   • Smoking status: Never Smoker   • Smokeless tobacco: Never Used   • Tobacco comment: smoked in highschool   Substance and Sexual Activity   • Alcohol use: No   • Drug use: No   • Sexual activity: Defer       Vitals:  Ht 182.9 cm (72\")   BMI 43.29 kg/m²   T  98.1 P 73 R 18 /62 Sp02 99% (room air)    I/O (24Hr):  No intake or output data in the 24 hours ending 11/14/19 1436    Labs and imaging:      Recent Results (from the past 12 hour(s))   POC Glucose Once    Collection Time: 11/14/19  7:45 AM   Result Value Ref Range    Glucose 103 70 - 130 mg/dL   Basic Metabolic Panel    Collection Time: 11/14/19  7:51 AM   Result Value Ref Range    Glucose 123 (H) 65 - 99 mg/dL    BUN 27 (H) 8 - 23 mg/dL    Creatinine 1.47 (H) 0.76 - 1.27 mg/dL    Sodium 139 136 - 145 mmol/L    Potassium 4.6 3.5 - 5.2 mmol/L    Chloride 100 98 - 107 mmol/L    CO2 27.0 22.0 - 29.0 mmol/L    Calcium 9.1 8.6 - 10.5 mg/dL    eGFR Non African Amer 48 (L) >60 mL/min/1.73    BUN/Creatinine Ratio 18.4 7.0 - 25.0    Anion Gap 12.0 5.0 - 15.0 mmol/L   BNP    Collection Time: 11/14/19  7:51 AM   Result Value Ref Range    proBNP 168.7 5.0 - 900.0 pg/mL   Sedimentation Rate    Collection Time: 11/14/19  7:51 AM   Result Value Ref Range    Sed Rate 28 (H) 0 - 15 mm/hr   C-reactive Protein    Collection Time: 11/14/19  7:51 AM   Result Value Ref Range    C-Reactive Protein 0.47 0.00 - 0.50 mg/dL   CBC Auto Differential    Collection Time: 11/14/19  7:51 AM   Result Value Ref Range    WBC 8.06 3.40 - 10.80 10*3/mm3    RBC " 4.16 4.14 - 5.80 10*6/mm3    Hemoglobin 11.8 (L) 13.0 - 17.7 g/dL    Hematocrit 35.4 (L) 37.5 - 51.0 %    MCV 85.1 79.0 - 97.0 fL    MCH 28.4 26.6 - 33.0 pg    MCHC 33.3 31.5 - 35.7 g/dL    RDW 14.3 12.3 - 15.4 %    RDW-SD 44.0 37.0 - 54.0 fl    MPV 11.0 6.0 - 12.0 fL    Platelets 220 140 - 450 10*3/mm3    Neutrophil % 63.9 42.7 - 76.0 %    Lymphocyte % 17.7 (L) 19.6 - 45.3 %    Monocyte % 9.1 5.0 - 12.0 %    Eosinophil % 8.4 (H) 0.3 - 6.2 %    Basophil % 0.5 0.0 - 1.5 %    Immature Grans % 0.4 0.0 - 0.5 %    Neutrophils, Absolute 5.15 1.70 - 7.00 10*3/mm3    Lymphocytes, Absolute 1.43 0.70 - 3.10 10*3/mm3    Monocytes, Absolute 0.73 0.10 - 0.90 10*3/mm3    Eosinophils, Absolute 0.68 (H) 0.00 - 0.40 10*3/mm3    Basophils, Absolute 0.04 0.00 - 0.20 10*3/mm3    Immature Grans, Absolute 0.03 0.00 - 0.05 10*3/mm3    nRBC 0.0 0.0 - 0.2 /100 WBC   POC Glucose Once    Collection Time: 11/14/19 11:21 AM   Result Value Ref Range    Glucose 161 (H) 70 - 130 mg/dL           Physical Examination:        Physical Exam   Constitutional: He is oriented to person, place, and time. He appears well-developed and well-nourished.   HENT:   Head: Normocephalic and atraumatic.   Nose: Nose normal.   Mouth/Throat: Oropharynx is clear and moist.   Eyes: EOM are normal. Pupils are equal, round, and reactive to light.   Neck: Normal range of motion. Neck supple.   Cardiovascular: Normal rate, regular rhythm, normal heart sounds and intact distal pulses.   Pulmonary/Chest: Effort normal and breath sounds normal.   Abdominal: Soft. Bowel sounds are normal.   obese   Musculoskeletal: Normal range of motion.   Generalized weakness   Neurological: He is alert and oriented to person, place, and time. He has normal reflexes.   Skin: Skin is warm and dry.   Dressing c.d.i   Psychiatric: He has a normal mood and affect. His behavior is normal.   Nursing note and vitals reviewed.        Assessment:            * No active hospital problems. *    Past  Medical History:   Diagnosis Date   • Arthritis    • CHF (congestive heart failure) (CMS/[facility])    • Coronary artery disease    • Diabetes mellitus (CMS/[facility])    • Hyperlipidemia    • Hypertension    • Myocardial infarction (CMS/[facility])    • Pancreatitis    • Renal disorder    • Sleep apnea with use of continuous positive airway pressure (CPAP)         Plan:        1. Osteomyelitis right foot  2. Diabetic ulcer right foot  3. Poorly controlled DM2 with hyperglycemia on long term insulin  4. Morbid obesity  5. Acute kidney injury on CKD3  6. Constipation  7. Diabetic neuropathy  8. Chronic diastolic CHF  9. GERD  10. HLD  11. BRITTNI    Continue current treatment. Monitor counts. Increase activity. Labs Monday. Continue antibiotics under direction of ID. Wound care per wound care team/Dr. Cerrato. Glycemic control. . Maintain patient safety. Monitor renal function. Continue laxatives.    Electronically signed by SUSAN Muñoz on 11/14/2019 at 2:36 PM

## 2019-11-14 NOTE — PROGRESS NOTES
PROGRESS NOTE.      Patient:  Erick Luong  YOB: 1955  Date of Service: 11/14/2019  MRN: [id number]   Acct: [id number]   Primary Care Physician: Del Shetty MD  Advance Directive:   There are no questions and answers to display.     Admit Date: 11/6/2019       Hospital Day: 0  Referring Provider: Juan Manuel Page MD      Patient Seen, Chart, Consults, Notes, Labs, Radiology studies reviewed.    Chief complaint: Abnormal labs.    Subjective:  Erick Luong is a 64 y.o. male  whom we were consulted for acute kidney injury.  Baseline chronic kidney disease stage 3, baseline creatinine 1.3.  Has followed with our office on an irregular basis in the past.  History of type 2 diabetes, hypertension, congestive heart failure.  Undergoing treatment for chronic right foot wound; has PICC line in place and has been getting IV Vancomycin. He had an acute hospital stay at [facility] after he developed increasing edema, dyspnea and diminished urine output.  Hospital course remarkable for initiation of Bumex drip; good output since it was started. He had WANDER/ CKD stage 3 and hyperkalemia. He was stabilized and now transferred to LTAC for rehab. Renals service was called to monitor his electrolytes and follow his CKD stage 3. Patient has no dysuria.    He has no new complaint.  He is currently receiving IV antibiotics.  He is feeling well today.      Review of Systems:  History obtained from chart review and the patient  General ROS: No fever or chills  Respiratory ROS: No cough, shortness of breath, wheezing  Cardiovascular ROS: no chest pain or dyspnea on exertion  Gastrointestinal ROS: No abdominal pain or melena  Genito-Urinary ROS: No dysuria or hematuria  Musculoskeletal: negative  Skin: negative    Objective:  Blood pressure: 133/62 mmHg  Heart rate is 79 bpm  O2 saturation 100%.      Physical examination:  HEENT: Normocephalic atraumatic head  Neck: Supple with no JVD.  General: awake/alert   Chest:   clear to auscultation bilaterally without respiratory distress  CVS: regular rate and rhythm  Abdominal: soft, nontender, normal bowel sounds  Extremities: Trace leg edema  Skin: Right foot is covered with a dressing  Neuro: No focal motor deficits    Labs:  Lab Results (last 24 hours)     Procedure Component Value Units Date/Time    POC Glucose Once [[id number]]  (Abnormal) Collected:  11/14/19 1121    Specimen:  Blood Updated:  11/14/19 1136     Glucose 161 mg/dL      Comment: : SREEKANTH Lavongayatri RadhaYoshi ID: [id number]       Sedimentation Rate [[id number]]  (Abnormal) Collected:  11/14/19 0751    Specimen:  Blood Updated:  11/14/19 0834     Sed Rate 28 mm/hr     Basic Metabolic Panel [[id number]]  (Abnormal) Collected:  11/14/19 0751    Specimen:  Blood Updated:  11/14/19 0820     Glucose 123 mg/dL      BUN 27 mg/dL      Creatinine 1.47 mg/dL      Sodium 139 mmol/L      Potassium 4.6 mmol/L      Chloride 100 mmol/L      CO2 27.0 mmol/L      Calcium 9.1 mg/dL      eGFR Non African Amer 48 mL/min/1.73      BUN/Creatinine Ratio 18.4     Anion Gap 12.0 mmol/L     Narrative:       GFR Normal >60  Chronic Kidney Disease <60  Kidney Failure <15    BNP [[id number]]  (Normal) Collected:  11/14/19 0751    Specimen:  Blood Updated:  11/14/19 0820     proBNP 168.7 pg/mL     Narrative:       Among patients with dyspnea, NT-proBNP is highly sensitive for the detection of acute congestive heart failure. In addition NT-proBNP of <300 pg/ml effectively rules out acute congestive heart failure with 99% negative predictive value.    C-reactive Protein [[id number]]  (Normal) Collected:  11/14/19 0751    Specimen:  Blood Updated:  11/14/19 0820     C-Reactive Protein 0.47 mg/dL     CBC & Differential [[id number]] Collected:  11/14/19 0751    Specimen:  Blood Updated:  11/14/19 0803    Narrative:       The following orders were created for panel order CBC & Differential.  Procedure                               Abnormality          Status                     ---------                               -----------         ------                     CBC Auto Differential[[id number]]        Abnormal            Final result                 Please view results for these tests on the individual orders.    CBC Auto Differential [[id number]]  (Abnormal) Collected:  11/14/19 0751    Specimen:  Blood Updated:  11/14/19 0803     WBC 8.06 10*3/mm3      RBC 4.16 10*6/mm3      Hemoglobin 11.8 g/dL      Hematocrit 35.4 %      MCV 85.1 fL      MCH 28.4 pg      MCHC 33.3 g/dL      RDW 14.3 %      RDW-SD 44.0 fl      MPV 11.0 fL      Platelets 220 10*3/mm3      Neutrophil % 63.9 %      Lymphocyte % 17.7 %      Monocyte % 9.1 %      Eosinophil % 8.4 %      Basophil % 0.5 %      Immature Grans % 0.4 %      Neutrophils, Absolute 5.15 10*3/mm3      Lymphocytes, Absolute 1.43 10*3/mm3      Monocytes, Absolute 0.73 10*3/mm3      Eosinophils, Absolute 0.68 10*3/mm3      Basophils, Absolute 0.04 10*3/mm3      Immature Grans, Absolute 0.03 10*3/mm3      nRBC 0.0 /100 WBC     POC Glucose Once [[id number]]  (Normal) Collected:  11/14/19 0745    Specimen:  Blood Updated:  11/14/19 0756     Glucose 103 mg/dL      Comment: : SREEKANTH Fong ID: [id number]       POC Glucose Once [[id number]]  (Abnormal) Collected:  11/13/19 2015    Specimen:  Blood Updated:  11/13/19 2046     Glucose 363 mg/dL      Comment: : SEGUNDO Frausto KathyMeter ID: [id number]       POC Glucose Once [[id number]]  (Abnormal) Collected:  11/13/19 2013    Specimen:  Blood Updated:  11/13/19 2046     Glucose 480 mg/dL      Comment: : SEGUNDO CastillohyMeter ID: [id number]       POC Glucose Once [[id number]]  (Abnormal) Collected:  11/13/19 1703    Specimen:  Blood Updated:  11/13/19 1715     Glucose 373 mg/dL      Comment: : [email or web address] BaughilyMeter ID: [id number]             Radiology:   Imaging Results (Last 24 Hours)     ** No results found for the last 24  hours. **              Assessment     1.  Acute kidney injury due to ATN--stable  2.  Baseline chronic kidney disease stage 3  3.  Type 2 diabetes with nephropathy  4.  Essential hypertension  5.  Diabetic foot ulcer/right.  6.  Volume overload--improving  7.  Anemia   8.  Hyperkalemia- K level is adequate      Plan:  1.  P.o. diuretics.  2.  IV antibiotics.  3.  Continue to monitor renal recovery.        Brian Loams MD  11/14/2019  4:18 PM

## 2019-11-14 NOTE — PROGRESS NOTES
Adult Nutrition  Assessment/PES    Patient Name:  Erick Luong  YOB: 1955  MRN: [id number]  Admit Date:  11/6/2019    Assessment Date:  11/14/2019    Comments:  PO intake is good, 86% avg of 7 meals. Pt has been having frequent diarrhea. Pertinent labs reviewed, will continue to follow.     Reason for Assessment     Row Name 11/14/19 1123          Reason for Assessment    Reason For Assessment  follow-up protocol         Nutrition/Diet History     Row Name 11/14/19 1124          Nutrition/Diet History    Typical Food/Fluid Intake  PO intake has been good.      Factors Affecting Nutritional Intake  diarrhea         Anthropometrics     Row Name 11/14/19 1124          Body Mass Index (BMI)    BMI Assessment  BMI 40 or greater: obesity grade III         Labs/Tests/Procedures/Meds     Row Name 11/14/19 1124          Labs/Procedures/Meds    Lab Results Reviewed  reviewed, pertinent     Lab Results Comments  glucose; Cr; BUN; GFR 48        Medications    Pertinent Medications Reviewed  reviewed     Pertinent Medications Comments  bumex; florastor         Physical Findings     Row Name 11/14/19 1127          Physical Findings    Gastrointestinal  diarrhea     Skin  other (see comments);non-healing wound(s) james 23           Nutrition Prescription Ordered     Row Name 11/14/19 1127          Nutrition Prescription PO    Current PO Diet  Regular     Common Modifiers  Consistent Carbohydrate;Renal;Low Potassium         Evaluation of Received Nutrient/Fluid Intake     Row Name 11/14/19 1127          Nutrient/Fluid Evaluation    Number of Days Evaluated  2 days     Additional Documentation  Fluid Intake Evaluation (Group)        Fluid Intake Evaluation    Oral Fluid (mL)  1320     IV Fluid (mL)  75        PO Evaluation    Number of Days PO Intake Evaluated  3 days     Number of Meals  7     % PO Intake  86               Problem/Interventions:    Problem 2     Row Name 11/14/19 1128          Nutrition Diagnoses  Problem 2    Problem 2  Altered Nutrition Related to Labs     Etiology (related to)  Medical Diagnosis     Renal  WANDER;CKD     Signs/Symptoms (evidenced by)  Biochemical     Labs Reviewed  Done     Specific Labs Noted  BUN;Creatinine;GFR;Glucose             Intervention Goal     Row Name 11/14/19 1128          Intervention Goal    General  Improved nutrition related lab(s);Reduce/improve symptoms;Meet nutritional needs for age/condition;Disease management/therapy;Maintain nutrition     PO  Maintain intake     Weight  Appropriate weight loss         Nutrition Intervention     Row Name 11/14/19 1129          Nutrition Intervention    RD/Tech Action  Follow Tx progress;Care plan reviewd           Education/Evaluation     Row Name 11/14/19 1129          Education    Education  No discharge needs identified at this time        Monitor/Evaluation    Monitor  Per protocol           Electronically signed by:  Jessica Mccord  11/14/19 11:29 AM

## 2019-11-15 LAB
ANION GAP SERPL CALCULATED.3IONS-SCNC: 11 MMOL/L (ref 5–15)
BUN BLD-MCNC: 26 MG/DL (ref 8–23)
BUN/CREAT SERPL: 19 (ref 7–25)
CALCIUM SPEC-SCNC: 8.8 MG/DL (ref 8.6–10.5)
CHLORIDE SERPL-SCNC: 102 MMOL/L (ref 98–107)
CO2 SERPL-SCNC: 25 MMOL/L (ref 22–29)
CREAT BLD-MCNC: 1.37 MG/DL (ref 0.76–1.27)
GFR SERPL CREATININE-BSD FRML MDRD: 52 ML/MIN/1.73
GLUCOSE BLD-MCNC: 173 MG/DL (ref 65–99)
GLUCOSE BLDC GLUCOMTR-MCNC: 157 MG/DL (ref 70–130)
GLUCOSE BLDC GLUCOMTR-MCNC: 181 MG/DL (ref 70–130)
GLUCOSE BLDC GLUCOMTR-MCNC: 310 MG/DL (ref 70–130)
GLUCOSE BLDC GLUCOMTR-MCNC: 331 MG/DL (ref 70–130)
POTASSIUM BLD-SCNC: 4.4 MMOL/L (ref 3.5–5.2)
SODIUM BLD-SCNC: 138 MMOL/L (ref 136–145)

## 2019-11-15 PROCEDURE — 63710000001 INSULIN DETEMIR PER 5 UNITS: Performed by: NURSE PRACTITIONER

## 2019-11-15 PROCEDURE — 80048 BASIC METABOLIC PNL TOTAL CA: CPT | Performed by: INTERNAL MEDICINE

## 2019-11-15 PROCEDURE — 63710000001 INSULIN LISPRO (HUMAN) PER 5 UNITS: Performed by: INTERNAL MEDICINE

## 2019-11-15 PROCEDURE — 82962 GLUCOSE BLOOD TEST: CPT

## 2019-11-15 NOTE — PROGRESS NOTES
[facility]  NIMESH Cortes M.D.  SUSAN Haney APRN      Internal Medicine Progress Note    11/15/2019   10:03 AM    Name:  Erick Luong  MRN:    [id number]     Acct:     [id number]   Room:  [address] [place] [address] Day: 0     Admit Date: 11/6/2019  4:54 PM  PCP: Del Shetty MD    Subjective:     C/C: weakness, need for continued wound care and iv antibiotics    Interval History: Status:  stayed the same. Resting in bed.  No family at bedside. Woke from sleep. Tolerating treatment thus far. Pain well controlled. Progressing with therapy. Blood sugars significantly elevated again [id number] today. Counts otherwise stable. Renal function continues to improve. No new concerns.     Review of Systems   Constitution: Negative for chills, decreased appetite, weakness, malaise/fatigue, weight gain and weight loss.   HENT: Negative for congestion, ear discharge, hoarse voice and tinnitus.    Eyes: Negative for blurred vision, discharge, visual disturbance and visual halos.   Cardiovascular: Negative for chest pain, claudication, dyspnea on exertion, irregular heartbeat, leg swelling, orthopnea and paroxysmal nocturnal dyspnea.   Respiratory: Negative for cough, shortness of breath, sputum production and wheezing.    Endocrine: Negative for cold intolerance, heat intolerance and polyuria.   Hematologic/Lymphatic: Negative for adenopathy. Does not bruise/bleed easily.   Skin: Positive for poor wound healing. Negative for dry skin, itching and suspicious lesions.   Musculoskeletal: Negative for arthritis, back pain, falls, joint pain, muscle weakness and myalgias.   Gastrointestinal: Positive for constipation. Negative for abdominal pain, diarrhea, dysphagia and hematemesis.   Genitourinary: Negative for bladder incontinence, dysuria and frequency.   Neurological: Negative for aphonia, disturbances in coordination and dizziness.   Psychiatric/Behavioral: Negative for  "altered mental status, depression, memory loss and substance abuse. The patient does not have insomnia and is not nervous/anxious.        Medications:     Allergies:   Allergies   Allergen Reactions   • Bactrim [Sulfamethoxazole-Trimethoprim] Other (See Comments)     \"RENAL FAILURE\"   • Vancomycin Itching       Current Meds:   Current Facility-Administered Medications:   •  acetaminophen (TYLENOL) tablet 650 mg, 650 mg, Oral, Q4H PRN **OR** acetaminophen (TYLENOL) 160 MG/5ML solution 650 mg, 650 mg, Oral, Q4H PRN **OR** acetaminophen (TYLENOL) suppository 650 mg, 650 mg, Rectal, Q4H PRN, Juan Manuel Page MD  •  aspirin EC tablet 81 mg, 81 mg, Oral, Daily, Juan Manuel Page MD  •  bisacodyl (DULCOLAX) suppository 10 mg, 10 mg, Rectal, Daily PRN, Juan Manuel Page MD  •  bumetanide (BUMEX) tablet 0.5 mg, 0.5 mg, Oral, Daily, Juan Manuel Page MD  •  clindamycin (CLEOCIN) 900 mg in dextrose 5% 50 mL IVPB (premix), 900 mg, Intravenous, Q8H, Juan Manuel Page MD  •  dextrose (D50W) 25 g/ 50mL Intravenous Solution 25 g, 25 g, Intravenous, Q15 Min PRN, Juan Manuel Page MD  •  dextrose (GLUTOSE) oral gel 15 g, 15 g, Oral, Q15 Min PRN, Juan Manuel Page MD  •  diphenhydrAMINE (BENADRYL) injection 25 mg, 25 mg, Intravenous, Q6H PRN, Juan Manuel Page MD  •  docusate sodium (COLACE) capsule 100 mg, 100 mg, Oral, BID, Juan Manuel Page MD  •  DULoxetine (CYMBALTA) DR capsule 60 mg, 60 mg, Oral, Daily, Juan Manuel Page MD  •  enoxaparin (LOVENOX) syringe 40 mg, 40 mg, Subcutaneous, Q24H, Juan Manuel Page MD  •  gabapentin (NEURONTIN) capsule 100 mg, 100 mg, Oral, BID, Juan Manuel Page MD  •  glucagon (human recombinant) (GLUCAGEN DIAGNOSTIC) injection 1 mg, 1 mg, Subcutaneous, Q15 Min PRN, Juan Manuel Page MD  •  insulin detemir (LEVEMIR) injection 55 Units, 55 Units, Subcutaneous, Q12H, Shruthi Isabel APRN  •  insulin lispro (humaLOG) injection 0-24 " Units, 0-24 Units, Subcutaneous, 4x Daily With Meals & Nightly, Juan Manuel Page MD  •  ipratropium-albuterol (DUO-NEB) nebulizer solution 3 mL, 3 mL, Nebulization, Q6H PRN, Juan Manuel Page MD  •  lactulose solution 20 g, 20 g, Oral, BID, Juan Manuel Page MD  •  latanoprost (XALATAN) 0.005 % ophthalmic solution 1 drop, 1 drop, Both Eyes, Nightly, Juan Manuel Page MD  •  magnesium hydroxide (MILK OF MAGNESIA) suspension 2400 mg/10mL 10 mL, 10 mL, Oral, Daily PRN, Juan Manuel Page MD  •  methylnaltrexone (RELISTOR) injection 8 mg, 8 mg, Subcutaneous, Every Other Day, Juan Manuel Page MD  •  nystatin (MYCOSTATIN) powder, , Topical, Q12H, Juan Manuel Page MD  •  ondansetron (ZOFRAN) injection 4 mg, 4 mg, Intravenous, Q4H PRN, Juan Manuel Page MD  •  oxyCODONE-acetaminophen (PERCOCET) 7.5-325 MG per tablet 1 tablet, 1 tablet, Oral, Q4H PRN, Juan Manuel Page MD  •  pantoprazole (PROTONIX) EC tablet 40 mg, 40 mg, Oral, Nightly, Juan Manuel Page MD  •  pneumococcal conj. 13-valent (PREVNAR-13) vaccine 0.5 mL, 0.5 mL, Intramuscular, Once, Juan Manuel Page MD  •  polyethylene glycol 3350 powder (packet), 17 g, Oral, BID, Juan Manuel Page MD  •  rosuvastatin (CRESTOR) tablet 40 mg, 40 mg, Oral, Nightly, Juan Manuel Page MD  •  saccharomyces boulardii (FLORASTOR) capsule 250 mg, 250 mg, Oral, BID, Juan Manuel Page MD  •  sodium chloride 0.9 % flush 10 mL, 10 mL, Intravenous, Q12H, Juan Manuel Page MD  •  sodium chloride 0.9 % flush 10 mL, 10 mL, Intravenous, PRN, Juan Manuel Page MD  •  sorbitol solution 30 mL, 30 mL, Oral, Q6H PRN, Juan Manuel Page MD  •  traZODone (DESYREL) tablet 25 mg, 25 mg, Oral, Nightly PRN, Juan Manuel Page MD  •  vitamin A & D ointment, , Topical, Daily, Ivonne Staples, APRN    Data:     Code Status:    There are no questions and answers to display.       Family History   Problem Relation  "Age of Onset   • Colon cancer Father    • Heart disease Father    • Colon cancer Sister    • Colon polyps Sister    • Alzheimer's disease Mother    • Coronary artery disease Sister    • Coronary artery disease Sister        Social History     Socioeconomic History   • Marital status:      Spouse name: Not on file   • Number of children: Not on file   • Years of education: Not on file   • Highest education level: Not on file   Tobacco Use   • Smoking status: Never Smoker   • Smokeless tobacco: Never Used   • Tobacco comment: smoked in highschool   Substance and Sexual Activity   • Alcohol use: No   • Drug use: No   • Sexual activity: Defer       Vitals:  Ht 182.9 cm (72\")   BMI 43.29 kg/m²   T  97.9 P 74 R 14 /59 Sp02 98% (room air)    I/O (24Hr):  No intake or output data in the 24 hours ending 11/15/19 1003    Labs and imaging:      Recent Results (from the past 12 hour(s))   POC Glucose Once    Collection Time: 11/15/19  7:43 AM   Result Value Ref Range    Glucose 181 (H) 70 - 130 mg/dL   Basic Metabolic Panel    Collection Time: 11/15/19  8:39 AM   Result Value Ref Range    Glucose 173 (H) 65 - 99 mg/dL    BUN 26 (H) 8 - 23 mg/dL    Creatinine 1.37 (H) 0.76 - 1.27 mg/dL    Sodium 138 136 - 145 mmol/L    Potassium 4.4 3.5 - 5.2 mmol/L    Chloride 102 98 - 107 mmol/L    CO2 25.0 22.0 - 29.0 mmol/L    Calcium 8.8 8.6 - 10.5 mg/dL    eGFR Non African Amer 52 (L) >60 mL/min/1.73    BUN/Creatinine Ratio 19.0 7.0 - 25.0    Anion Gap 11.0 5.0 - 15.0 mmol/L           Physical Examination:        Physical Exam   Constitutional: He is oriented to person, place, and time. He appears well-developed and well-nourished.   HENT:   Head: Normocephalic and atraumatic.   Nose: Nose normal.   Mouth/Throat: Oropharynx is clear and moist.   Eyes: EOM are normal. Pupils are equal, round, and reactive to light.   Neck: Normal range of motion. Neck supple.   Cardiovascular: Normal rate, regular rhythm, normal heart sounds " and intact distal pulses.   Pulmonary/Chest: Effort normal and breath sounds normal.   Abdominal: Soft. Bowel sounds are normal.   obese   Musculoskeletal: Normal range of motion.   Generalized weakness   Neurological: He is alert and oriented to person, place, and time. He has normal reflexes.   Skin: Skin is warm and dry.   Dressing c.d.i   Psychiatric: He has a normal mood and affect. His behavior is normal.   Nursing note and vitals reviewed.        Assessment:            * No active hospital problems. *    Past Medical History:   Diagnosis Date   • Arthritis    • CHF (congestive heart failure) (CMS/HCC)    • Coronary artery disease    • Diabetes mellitus (CMS/HCC)    • Hyperlipidemia    • Hypertension    • Myocardial infarction (CMS/[facility])    • Pancreatitis    • Renal disorder    • Sleep apnea with use of continuous positive airway pressure (CPAP)         Plan:        1. Osteomyelitis right foot  2. Diabetic ulcer right foot  3. Poorly controlled DM2 with hyperglycemia on long term insulin  4. Morbid obesity  5. Acute kidney injury on CKD3  6. Constipation  7. Diabetic neuropathy  8. Chronic diastolic CHF  9. GERD  10. HLD  11. BRITTNI    Continue current treatment. Monitor counts. Increase activity. Labs Monday. Continue antibiotics under direction of ID. Wound care per wound care team/Dr. Cerrato. Glycemic control - adjust insulin regimen. Maintain patient safety. Monitor renal function.     Electronically signed by SUSAN Muñoz on 11/15/2019 at 10:03 AM

## 2019-11-15 NOTE — PROGRESS NOTES
PROGRESS NOTE.      Patient:  Erick Luong  YOB: 1955  Date of Service: 11/15/2019  MRN: [id number]   Acct: [id number]   Primary Care Physician: Del Shetty MD  Advance Directive:   There are no questions and answers to display.     Admit Date: 11/6/2019       Hospital Day: 0  Referring Provider: Juan Manuel Page MD      Patient Seen, Chart, Consults, Notes, Labs, Radiology studies reviewed.    Chief complaint: Abnormal labs.    Subjective:  Erick Luong is a 64 y.o. male  whom we were consulted for acute kidney injury.  Baseline chronic kidney disease stage 3, baseline creatinine 1.3.  Has followed with our office on an irregular basis in the past.  History of type 2 diabetes, hypertension, congestive heart failure.  Undergoing treatment for chronic right foot wound; has PICC line in place and has been getting IV Vancomycin. He had an acute hospital stay at [facility] after he developed increasing edema, dyspnea and diminished urine output.  Hospital course remarkable for initiation of Bumex drip; good output since it was started. He had WANDER/ CKD stage 3 and hyperkalemia. He was stabilized and now transferred to LTAC for rehab. Renals service was called to monitor his electrolytes and follow his CKD stage 3. Patient has no dysuria.    Patient is doing very well, receiving IV antibiotics and wound care for his right foot ulcer.      Review of Systems:  History obtained from chart review and the patient  General ROS: No fever or chills  Respiratory ROS: No cough, shortness of breath, wheezing  Cardiovascular ROS: no chest pain or dyspnea on exertion  Gastrointestinal ROS: No abdominal pain or melena  Genito-Urinary ROS: No dysuria or hematuria  Musculoskeletal: negative  Skin: negative    Objective:  Blood pressure: 133/62 mmHg  Heart rate is 79 bpm  O2 saturation 100%.      Physical examination:  HEENT: Normocephalic atraumatic head  Neck: Supple with no JVD.  General: awake/alert   Chest:   clear to auscultation bilaterally without respiratory distress  CVS: regular rate and rhythm  Abdominal: soft, nontender, normal bowel sounds  Extremities: Trace leg edema  Skin: Right foot is covered with a dressing  Neuro: No focal motor deficits    Labs:  Lab Results (last 24 hours)     Procedure Component Value Units Date/Time    Basic Metabolic Panel [[id number]]  (Abnormal) Collected:  11/15/19 0839    Specimen:  Blood Updated:  11/15/19 0930     Glucose 173 mg/dL      BUN 26 mg/dL      Creatinine 1.37 mg/dL      Sodium 138 mmol/L      Potassium 4.4 mmol/L      Chloride 102 mmol/L      CO2 25.0 mmol/L      Calcium 8.8 mg/dL      eGFR Non African Amer 52 mL/min/1.73      BUN/Creatinine Ratio 19.0     Anion Gap 11.0 mmol/L     Narrative:       GFR Normal >60  Chronic Kidney Disease <60  Kidney Failure <15    POC Glucose Once [[id number]]  (Abnormal) Collected:  11/15/19 0743    Specimen:  Blood Updated:  11/15/19 0755     Glucose 181 mg/dL      Comment: : SREEKANTH Fentoneter ID: [id number]       POC Glucose Once [[id number]]  (Abnormal) Collected:  11/14/19 2006    Specimen:  Blood Updated:  11/14/19 2018     Glucose 374 mg/dL      Comment: : SEGUNDO Carter ID: [id number]       POC Glucose Once [[id number]]  (Abnormal) Collected:  11/14/19 1635    Specimen:  Blood Updated:  11/14/19 1810     Glucose 418 mg/dL      Comment: : SREEKANTH Fentoneter ID: [id number]       POC Glucose Once [[id number]]  (Abnormal) Collected:  11/14/19 1634    Specimen:  Blood Updated:  11/14/19 1809     Glucose 429 mg/dL      Comment: : SREEKANTH CoronalynMeter ID: [id number]       POC Glucose Once [[id number]]  (Abnormal) Collected:  11/14/19 1121    Specimen:  Blood Updated:  11/14/19 1136     Glucose 161 mg/dL      Comment: : SREEKANTH RichnMeter ID: [id number]             Radiology:   Imaging Results (Last 24 Hours)     ** No results found for the last  24 hours. **              Assessment     1.  Acute kidney injury-ATN/improved.  2.  Baseline chronic kidney disease stage 3  3.  Type 2 diabetes with nephropathy  4.  Essential hypertension  5.  Diabetic foot ulcer/right.  6.  Volume overload--improving  7.  Anemia   8.  Hyperkalemia- K level is adequate      Plan:  1.  P.o. diuretics.  2.  IV antibiotics.  3.  Continue to monitor renal recovery.        Brian Lomas MD  11/15/2019  11:12 AM

## 2019-11-16 LAB
GLUCOSE BLDC GLUCOMTR-MCNC: 149 MG/DL (ref 70–130)
GLUCOSE BLDC GLUCOMTR-MCNC: 294 MG/DL (ref 70–130)
GLUCOSE BLDC GLUCOMTR-MCNC: 351 MG/DL (ref 70–130)
GLUCOSE BLDC GLUCOMTR-MCNC: 362 MG/DL (ref 70–130)

## 2019-11-16 PROCEDURE — 63710000001 INSULIN DETEMIR PER 5 UNITS: Performed by: NURSE PRACTITIONER

## 2019-11-16 PROCEDURE — 82962 GLUCOSE BLOOD TEST: CPT

## 2019-11-16 PROCEDURE — 25010000002 METHYLNALTREXONE 12 MG/0.6ML SOLUTION: Performed by: INTERNAL MEDICINE

## 2019-11-16 PROCEDURE — 63710000001 INSULIN LISPRO (HUMAN) PER 5 UNITS: Performed by: INTERNAL MEDICINE

## 2019-11-16 PROCEDURE — 25010000002 ENOXAPARIN PER 10 MG: Performed by: INTERNAL MEDICINE

## 2019-11-16 RX ORDER — OXYCODONE AND ACETAMINOPHEN 7.5; 325 MG/1; MG/1
1 TABLET ORAL EVERY 4 HOURS PRN
Status: DISCONTINUED | OUTPATIENT
Start: 2019-11-16 | End: 2019-11-19 | Stop reason: HOSPADM

## 2019-11-16 NOTE — PROGRESS NOTES
[facility]  NIMESH Cortes M.D.  SUSAN Haney APRN      Internal Medicine Progress Note    11/16/2019   12:05 PM    Name:  Erick Luong  MRN:    [id number]     Acct:     [id number]   Room:  [address] [place] [address] Day: 0     Admit Date: 11/6/2019  4:54 PM  PCP: Del Shetty MD    Subjective:     C/C: weakness, need for continued wound care and iv antibiotics    Interval History: Status: Improved. Resting in bed.  No family at bedside. Woke from sleep. Tolerating treatment thus far. Pain well controlled. Progressing with therapy. Blood sugars significantly elevated again [id number] today.  Doing well    Review of Systems   Constitution: Negative for chills, decreased appetite, weakness, malaise/fatigue, weight gain and weight loss.   HENT: Negative for congestion, ear discharge, hoarse voice and tinnitus.    Eyes: Negative for blurred vision, discharge, visual disturbance and visual halos.   Cardiovascular: Negative for chest pain, claudication, dyspnea on exertion, irregular heartbeat, leg swelling, orthopnea and paroxysmal nocturnal dyspnea.   Respiratory: Negative for cough, shortness of breath, sputum production and wheezing.    Endocrine: Negative for cold intolerance, heat intolerance and polyuria.   Hematologic/Lymphatic: Negative for adenopathy. Does not bruise/bleed easily.   Skin: Positive for poor wound healing. Negative for dry skin, itching and suspicious lesions.   Musculoskeletal: Negative for arthritis, back pain, falls, joint pain, muscle weakness and myalgias.   Gastrointestinal: Positive for constipation. Negative for abdominal pain, diarrhea, dysphagia and hematemesis.   Genitourinary: Negative for bladder incontinence, dysuria and frequency.   Neurological: Negative for aphonia, disturbances in coordination and dizziness.   Psychiatric/Behavioral: Negative for altered mental status, depression, memory loss and substance abuse. The  "patient does not have insomnia and is not nervous/anxious.        Medications:     Allergies:   Allergies   Allergen Reactions   • Bactrim [Sulfamethoxazole-Trimethoprim] Other (See Comments)     \"RENAL FAILURE\"   • Vancomycin Itching       Current Meds:   Current Facility-Administered Medications:   •  acetaminophen (TYLENOL) tablet 650 mg, 650 mg, Oral, Q4H PRN **OR** acetaminophen (TYLENOL) 160 MG/5ML solution 650 mg, 650 mg, Oral, Q4H PRN **OR** acetaminophen (TYLENOL) suppository 650 mg, 650 mg, Rectal, Q4H PRN, Juan Manuel Page MD  •  aspirin EC tablet 81 mg, 81 mg, Oral, Daily, Juan Manuel Page MD  •  bisacodyl (DULCOLAX) suppository 10 mg, 10 mg, Rectal, Daily PRN, Juan Manuel Page MD  •  bumetanide (BUMEX) tablet 0.5 mg, 0.5 mg, Oral, Daily, Juan Manuel Page MD  •  clindamycin (CLEOCIN) 900 mg in dextrose 5% 50 mL IVPB (premix), 900 mg, Intravenous, Q8H, Juan Manuel Page MD  •  dextrose (D50W) 25 g/ 50mL Intravenous Solution 25 g, 25 g, Intravenous, Q15 Min PRN, Juan Manuel Page MD  •  dextrose (GLUTOSE) oral gel 15 g, 15 g, Oral, Q15 Min PRN, Juan Manuel Page MD  •  diphenhydrAMINE (BENADRYL) injection 25 mg, 25 mg, Intravenous, Q6H PRN, Juan Manuel Page MD  •  docusate sodium (COLACE) capsule 100 mg, 100 mg, Oral, BID, Juan Manuel Page MD  •  DULoxetine (CYMBALTA) DR capsule 60 mg, 60 mg, Oral, Daily, Juan Manuel Page MD  •  enoxaparin (LOVENOX) syringe 40 mg, 40 mg, Subcutaneous, Q24H, Juan Manuel Page MD  •  gabapentin (NEURONTIN) capsule 100 mg, 100 mg, Oral, BID, Juan Manuel aPge MD  •  glucagon (human recombinant) (GLUCAGEN DIAGNOSTIC) injection 1 mg, 1 mg, Subcutaneous, Q15 Min PRN, Juan Manuel Page MD  •  insulin detemir (LEVEMIR) injection 57 Units, 57 Units, Subcutaneous, Q12H, Shruthi Isabel, SUSAN  •  insulin lispro (humaLOG) injection 0-24 Units, 0-24 Units, Subcutaneous, 4x Daily With Meals & Nightly, Camilo, " Juan Manuel Dillon MD  •  ipratropium-albuterol (DUO-NEB) nebulizer solution 3 mL, 3 mL, Nebulization, Q6H PRN, Juan Manuel Page MD  •  lactulose solution 20 g, 20 g, Oral, BID, Juan Manuel Page MD  •  latanoprost (XALATAN) 0.005 % ophthalmic solution 1 drop, 1 drop, Both Eyes, Nightly, Juan Manuel Page MD  •  magnesium hydroxide (MILK OF MAGNESIA) suspension 2400 mg/10mL 10 mL, 10 mL, Oral, Daily PRN, Juan Manuel Page MD  •  methylnaltrexone (RELISTOR) injection 8 mg, 8 mg, Subcutaneous, Every Other Day, Juan Manuel Page MD  •  nystatin (MYCOSTATIN) powder, , Topical, Q12H, Juan Manuel Page MD  •  ondansetron (ZOFRAN) injection 4 mg, 4 mg, Intravenous, Q4H PRN, Juan Manuel Page MD  •  oxyCODONE-acetaminophen (PERCOCET) 7.5-325 MG per tablet 1 tablet, 1 tablet, Oral, Q4H PRN, Juan Manuel Page MD  •  pantoprazole (PROTONIX) EC tablet 40 mg, 40 mg, Oral, Nightly, Juan Manuel Page MD  •  pneumococcal conj. 13-valent (PREVNAR-13) vaccine 0.5 mL, 0.5 mL, Intramuscular, Once, Juan Manuel Page MD  •  polyethylene glycol 3350 powder (packet), 17 g, Oral, BID, Juan Manuel Page MD  •  rosuvastatin (CRESTOR) tablet 40 mg, 40 mg, Oral, Nightly, Juan Manuel Page MD  •  saccharomyces boulardii (FLORASTOR) capsule 250 mg, 250 mg, Oral, BID, Juan Manuel Page MD  •  sodium chloride 0.9 % flush 10 mL, 10 mL, Intravenous, Q12H, Juan Manuel Page MD  •  sodium chloride 0.9 % flush 10 mL, 10 mL, Intravenous, PRN, Juan Manuel Page MD  •  sorbitol solution 30 mL, 30 mL, Oral, Q6H PRN, Juan Manuel Page MD  •  traZODone (DESYREL) tablet 25 mg, 25 mg, Oral, Nightly PRN, Juan Manuel Page MD  •  vitamin A & D ointment, , Topical, Daily, Ivonne Staples, APRN    Data:     Code Status:    There are no questions and answers to display.       Family History   Problem Relation Age of Onset   • Colon cancer Father    • Heart disease Father    • Colon  "cancer Sister    • Colon polyps Sister    • Alzheimer's disease Mother    • Coronary artery disease Sister    • Coronary artery disease Sister        Social History     Socioeconomic History   • Marital status:      Spouse name: Not on file   • Number of children: Not on file   • Years of education: Not on file   • Highest education level: Not on file   Tobacco Use   • Smoking status: Never Smoker   • Smokeless tobacco: Never Used   • Tobacco comment: smoked in highschool   Substance and Sexual Activity   • Alcohol use: No   • Drug use: No   • Sexual activity: Defer       Vitals:  Ht 182.9 cm (72\")   BMI 43.29 kg/m²   Temperature 97.4 pulse 69 respiratory 14 blood pressure 138/62 sat 100% (room air)    I/O (24Hr):  No intake or output data in the 24 hours ending 11/16/19 1205    Labs and imaging:      Recent Results (from the past 12 hour(s))   POC Glucose Once    Collection Time: 11/16/19  7:37 AM   Result Value Ref Range    Glucose 149 (H) 70 - 130 mg/dL   POC Glucose Once    Collection Time: 11/16/19 11:05 AM   Result Value Ref Range    Glucose 351 (H) 70 - 130 mg/dL           Physical Examination:        Physical Exam   Constitutional: He is oriented to person, place, and time. He appears well-developed and well-nourished.   HENT:   Head: Normocephalic and atraumatic.   Nose: Nose normal.   Mouth/Throat: Oropharynx is clear and moist.   Eyes: EOM are normal. Pupils are equal, round, and reactive to light.   Neck: Normal range of motion. Neck supple.   Cardiovascular: Normal rate, regular rhythm, normal heart sounds and intact distal pulses.   Pulmonary/Chest: Effort normal and breath sounds normal.   Abdominal: Soft. Bowel sounds are normal.   obese   Musculoskeletal: Normal range of motion.   Generalized weakness   Neurological: He is alert and oriented to person, place, and time. He has normal reflexes.   Skin: Skin is warm and dry.   Dressing c.d.i   Psychiatric: He has a normal mood and affect. His " behavior is normal.   Nursing note and vitals reviewed.        Assessment:            * No active hospital problems. *    Past Medical History:   Diagnosis Date   • Arthritis    • CHF (congestive heart failure) (CMS/HCC)    • Coronary artery disease    • Diabetes mellitus (CMS/HCC)    • Hyperlipidemia    • Hypertension    • Myocardial infarction (CMS/HCC)    • Pancreatitis    • Renal disorder    • Sleep apnea with use of continuous positive airway pressure (CPAP)         Plan:        1. Osteomyelitis right foot  2. Diabetic ulcer right foot  3. Poorly controlled DM2 with hyperglycemia on long term insulin  4. Morbid obesity  5. Acute kidney injury on CKD3  6. Constipation  7. Diabetic neuropathy  8. Chronic diastolic CHF  9. GERD  10. HLD  11. BRITTNI    Continue current treatment. Monitor counts. Increase activity. Labs Monday. Continue antibiotics under direction of ID. Wound care per wound care team/Dr. Cerrato. Glycemic control - adjust insulin regimen. Maintain patient safety. Monitor renal function.  Encouraged continued compliance.    Electronically signed by Juan Manuel Page MD on 11/16/2019 at 12:05 PM

## 2019-11-16 NOTE — PROGRESS NOTES
PROGRESS NOTE.      Patient:  Erick Luong  YOB: 1955  Date of Service: 11/16/2019  MRN: [id number]   Acct: [id number]   Primary Care Physician: Del Shetty MD  Advance Directive:   There are no questions and answers to display.     Admit Date: 11/6/2019       Hospital Day: 0  Referring Provider: Juan Manuel Page MD      Patient Seen, Chart, Consults, Notes, Labs, Radiology studies reviewed.    Chief complaint: Abnormal labs.    Subjective:  Erick Luong is a 64 y.o. male  whom we were consulted for acute kidney injury.  Baseline chronic kidney disease stage 3, baseline creatinine 1.3.  Has followed with our office on an irregular basis in the past.  History of type 2 diabetes, hypertension, congestive heart failure.  Undergoing treatment for chronic right foot wound; has PICC line in place and has been getting IV Vancomycin. He had an acute hospital stay at [facility] after he developed increasing edema, dyspnea and diminished urine output.  Hospital course remarkable for initiation of Bumex drip; good output since it was started. He had WANDER/ CKD stage 3 and hyperkalemia. He was stabilized and now transferred to LTAC for rehab. Renals service was called to monitor his electrolytes and follow his CKD stage 3. Patient has no dysuria.    Today he feels very well, sitting up in chair.      Review of Systems:  History obtained from chart review and the patient  General ROS: No fever or chills  Respiratory ROS: No cough, shortness of breath, wheezing  Cardiovascular ROS: no chest pain or dyspnea on exertion  Gastrointestinal ROS: No abdominal pain or melena  Genito-Urinary ROS: No dysuria or hematuria  Musculoskeletal: negative  Skin: negative    Objective:  Blood pressure: 138/62 mmHg  Heart rate is 79 bpm  O2 saturation 100%.      Physical examination:  HEENT: Normocephalic atraumatic head  Neck: Supple with no JVD.  General: awake/alert   Chest:  clear to auscultation bilaterally without  respiratory distress  CVS: regular rate and rhythm  Abdominal: soft, nontender, normal bowel sounds  Extremities: Trace leg edema  Skin: Right foot is covered with a dressing  Neuro: No focal motor deficits    Labs:  Lab Results (last 24 hours)     Procedure Component Value Units Date/Time    POC Glucose Once [[id number]]  (Abnormal) Collected:  11/16/19 1105    Specimen:  Blood Updated:  11/16/19 1118     Glucose 351 mg/dL      Comment: : SREEKANTH Lavonmelodyalonso GarciajunaidhermelindanMeter ID: [id number]       POC Glucose Once [[id number]]  (Abnormal) Collected:  11/16/19 0737    Specimen:  Blood Updated:  11/16/19 0748     Glucose 149 mg/dL      Comment: : SREEKANTH Lavongayatri RadhajunaidhermelindanMeter ID: [id number]       POC Glucose Once [[id number]]  (Abnormal) Collected:  11/15/19 2033    Specimen:  Blood Updated:  11/15/19 2045     Glucose 331 mg/dL      Comment: : SEGUNDO Frausto KathyMeter ID: [id number]       POC Glucose Once [[id number]]  (Abnormal) Collected:  11/15/19 1648    Specimen:  Blood Updated:  11/15/19 1659     Glucose 310 mg/dL      Comment: : SREEKANTH Aram GarciajunaidhermelindanMeter ID: [id number]             Radiology:   Imaging Results (Last 24 Hours)     ** No results found for the last 24 hours. **              Assessment     1.  Acute kidney injury-ATN/improved.  2.  Baseline chronic kidney disease stage 3  3.  Type 2 diabetes with nephropathy  4.  Essential hypertension  5.  Diabetic foot ulcer/right.  6.  Volume overload--improving  7.  Anemia   8.  Hyperkalemia- K level is adequate      Plan:  1.  P.o. diuretics.  2.  IV antibiotics.  3.  Continue to monitor renal recovery.        Brian Lomas MD  11/16/2019  3:11 PM

## 2019-11-17 LAB
GLUCOSE BLDC GLUCOMTR-MCNC: 147 MG/DL (ref 70–130)
GLUCOSE BLDC GLUCOMTR-MCNC: 195 MG/DL (ref 70–130)
GLUCOSE BLDC GLUCOMTR-MCNC: 272 MG/DL (ref 70–130)
GLUCOSE BLDC GLUCOMTR-MCNC: 352 MG/DL (ref 70–130)

## 2019-11-17 PROCEDURE — 82962 GLUCOSE BLOOD TEST: CPT

## 2019-11-17 PROCEDURE — 63710000001 INSULIN DETEMIR PER 5 UNITS: Performed by: NURSE PRACTITIONER

## 2019-11-17 PROCEDURE — 25010000002 ENOXAPARIN PER 10 MG: Performed by: INTERNAL MEDICINE

## 2019-11-17 PROCEDURE — 63710000001 INSULIN LISPRO (HUMAN) PER 5 UNITS: Performed by: INTERNAL MEDICINE

## 2019-11-17 NOTE — PROGRESS NOTES
PROGRESS NOTE.      Patient:  Erick Luong  YOB: 1955  Date of Service: 11/17/2019  MRN: [id number]   Acct: [id number]   Primary Care Physician: Del Shetty MD  Advance Directive:   There are no questions and answers to display.     Admit Date: 11/6/2019       Hospital Day: 0  Referring Provider: Juan Manuel Page MD      Patient Seen, Chart, Consults, Notes, Labs, Radiology studies reviewed.    Chief complaint: Abnormal labs.    Subjective:  Erick Luong is a 64 y.o. male  whom we were consulted for acute kidney injury.  Baseline chronic kidney disease stage 3, baseline creatinine 1.3.  Has followed with our office on an irregular basis in the past.  History of type 2 diabetes, hypertension, congestive heart failure.  Undergoing treatment for chronic right foot wound; has PICC line in place and has been getting IV Vancomycin. He had an acute hospital stay at [facility] after he developed increasing edema, dyspnea and diminished urine output.  Hospital course remarkable for initiation of Bumex drip; good output since it was started. He had WANDER/ CKD stage 3 and hyperkalemia. He was stabilized and now transferred to LTAC for rehab. Renals service was called to monitor his electrolytes and follow his CKD stage 3. Patient has no dysuria.    This afternoon he feels well and hoping to go home on Tuesday.      Review of Systems:  History obtained from chart review and the patient  General ROS: No fever or chills  Respiratory ROS: No cough, shortness of breath, wheezing  Cardiovascular ROS: no chest pain or dyspnea on exertion  Gastrointestinal ROS: No abdominal pain or melena  Genito-Urinary ROS: No dysuria or hematuria  Musculoskeletal: negative  Skin: negative    Objective:  Blood pressure: 129/54 mmHg  Heart rate is 80/min.  O2 saturation 100%.      Physical examination:  HEENT: Normocephalic atraumatic head  Neck: Supple with no JVD.  General: awake/alert   Chest:  clear to auscultation  bilaterally without respiratory distress  CVS: regular rate and rhythm  Abdominal: soft, nontender, normal bowel sounds  Extremities: Trace leg edema  Skin: Right foot is covered with a dressing  Neuro: No focal motor deficits    Labs:  Lab Results (last 24 hours)     Procedure Component Value Units Date/Time    POC Glucose Once [[id number]]  (Abnormal) Collected:  11/17/19 1205    Specimen:  Blood Updated:  11/17/19 1227     Glucose 195 mg/dL      Comment: : GARCIA Whitfield MandyMeter ID: [id number]       POC Glucose Once [[id number]]  (Abnormal) Collected:  11/17/19 0748    Specimen:  Blood Updated:  11/17/19 0803     Glucose 147 mg/dL      Comment: : GARCIA Whitfield MandyMeter ID: [id number]       POC Glucose Once [[id number]]  (Abnormal) Collected:  11/16/19 1957    Specimen:  Blood Updated:  11/16/19 2009     Glucose 294 mg/dL      Comment: : HIEU Graves AnthonyMeter ID: [id number]       POC Glucose Once [[id number]]  (Abnormal) Collected:  11/16/19 1705    Specimen:  Blood Updated:  11/16/19 1716     Glucose 362 mg/dL      Comment: : SREEKANTH GarciaitlynMeter ID: [id number]             Radiology:   Imaging Results (Last 24 Hours)     ** No results found for the last 24 hours. **              Assessment     1.  Acute kidney injury-ATN/improved.  2.  Baseline chronic kidney disease stage 3  3.  Type 2 diabetes with nephropathy  4.  Essential hypertension  5.  Diabetic foot ulcer/right.  6.  Volume overload--improving  7.  Anemia   8.  Hyperkalemia- K level is adequate      Plan:  1.  P.o. diuretics.  2.  IV antibiotics.  3.  Continue to monitor renal recovery.        Brian Lomas MD  11/17/2019  3:11 PM

## 2019-11-18 VITALS — WEIGHT: 312.7 LBS | HEIGHT: 72 IN | BODY MASS INDEX: 42.35 KG/M2

## 2019-11-18 LAB
ANION GAP SERPL CALCULATED.3IONS-SCNC: 12 MMOL/L (ref 5–15)
BASOPHILS # BLD AUTO: 0.05 10*3/MM3 (ref 0–0.2)
BASOPHILS NFR BLD AUTO: 0.7 % (ref 0–1.5)
BUN BLD-MCNC: 28 MG/DL (ref 8–23)
BUN/CREAT SERPL: 19 (ref 7–25)
CALCIUM SPEC-SCNC: 9 MG/DL (ref 8.6–10.5)
CHLORIDE SERPL-SCNC: 101 MMOL/L (ref 98–107)
CO2 SERPL-SCNC: 26 MMOL/L (ref 22–29)
CREAT BLD-MCNC: 1.47 MG/DL (ref 0.76–1.27)
CRP SERPL-MCNC: 0.69 MG/DL (ref 0–0.5)
DEPRECATED RDW RBC AUTO: 43.8 FL (ref 37–54)
EOSINOPHIL # BLD AUTO: 0.57 10*3/MM3 (ref 0–0.4)
EOSINOPHIL NFR BLD AUTO: 8.3 % (ref 0.3–6.2)
ERYTHROCYTE [DISTWIDTH] IN BLOOD BY AUTOMATED COUNT: 14.2 % (ref 12.3–15.4)
ERYTHROCYTE [SEDIMENTATION RATE] IN BLOOD: 35 MM/HR (ref 0–15)
GFR SERPL CREATININE-BSD FRML MDRD: 48 ML/MIN/1.73
GLUCOSE BLD-MCNC: 138 MG/DL (ref 65–99)
GLUCOSE BLDC GLUCOMTR-MCNC: 119 MG/DL (ref 70–130)
GLUCOSE BLDC GLUCOMTR-MCNC: 223 MG/DL (ref 70–130)
GLUCOSE BLDC GLUCOMTR-MCNC: 235 MG/DL (ref 70–130)
GLUCOSE BLDC GLUCOMTR-MCNC: 297 MG/DL (ref 70–130)
HCT VFR BLD AUTO: 34.8 % (ref 37.5–51)
HGB BLD-MCNC: 11.3 G/DL (ref 13–17.7)
IMM GRANULOCYTES # BLD AUTO: 0.02 10*3/MM3 (ref 0–0.05)
IMM GRANULOCYTES NFR BLD AUTO: 0.3 % (ref 0–0.5)
LYMPHOCYTES # BLD AUTO: 1.74 10*3/MM3 (ref 0.7–3.1)
LYMPHOCYTES NFR BLD AUTO: 25.4 % (ref 19.6–45.3)
MCH RBC QN AUTO: 27.9 PG (ref 26.6–33)
MCHC RBC AUTO-ENTMCNC: 32.5 G/DL (ref 31.5–35.7)
MCV RBC AUTO: 85.9 FL (ref 79–97)
MONOCYTES # BLD AUTO: 0.8 10*3/MM3 (ref 0.1–0.9)
MONOCYTES NFR BLD AUTO: 11.7 % (ref 5–12)
NEUTROPHILS # BLD AUTO: 3.66 10*3/MM3 (ref 1.7–7)
NEUTROPHILS NFR BLD AUTO: 53.6 % (ref 42.7–76)
NRBC BLD AUTO-RTO: 0 /100 WBC (ref 0–0.2)
PLATELET # BLD AUTO: 208 10*3/MM3 (ref 140–450)
PMV BLD AUTO: 11.2 FL (ref 6–12)
POTASSIUM BLD-SCNC: 4.2 MMOL/L (ref 3.5–5.2)
RBC # BLD AUTO: 4.05 10*6/MM3 (ref 4.14–5.8)
SODIUM BLD-SCNC: 139 MMOL/L (ref 136–145)
WBC NRBC COR # BLD: 6.84 10*3/MM3 (ref 3.4–10.8)

## 2019-11-18 PROCEDURE — 85651 RBC SED RATE NONAUTOMATED: CPT | Performed by: INTERNAL MEDICINE

## 2019-11-18 PROCEDURE — 80048 BASIC METABOLIC PNL TOTAL CA: CPT | Performed by: INTERNAL MEDICINE

## 2019-11-18 PROCEDURE — 63710000001 INSULIN DETEMIR PER 5 UNITS: Performed by: NURSE PRACTITIONER

## 2019-11-18 PROCEDURE — 25010000002 METHYLNALTREXONE 12 MG/0.6ML SOLUTION: Performed by: INTERNAL MEDICINE

## 2019-11-18 PROCEDURE — 86140 C-REACTIVE PROTEIN: CPT | Performed by: INTERNAL MEDICINE

## 2019-11-18 PROCEDURE — 25010000002 ENOXAPARIN PER 10 MG: Performed by: INTERNAL MEDICINE

## 2019-11-18 PROCEDURE — 85025 COMPLETE CBC W/AUTO DIFF WBC: CPT | Performed by: INTERNAL MEDICINE

## 2019-11-18 PROCEDURE — 63710000001 INSULIN DETEMIR PER 5 UNITS: Performed by: INTERNAL MEDICINE

## 2019-11-18 PROCEDURE — 63710000001 INSULIN LISPRO (HUMAN) PER 5 UNITS: Performed by: INTERNAL MEDICINE

## 2019-11-18 PROCEDURE — 82962 GLUCOSE BLOOD TEST: CPT

## 2019-11-18 NOTE — PROGRESS NOTES
[facility]  NIMESH Cortes M.D.  SUSAN Haney APRN      Internal Medicine Progress Note    11/18/2019   2:43 PM    Name:  Erick Luong  MRN:    [id number]     Acct:     [id number]   Room:  [address] [place] [address] Day: 0     Admit Date: 11/6/2019  4:54 PM  PCP: Del Shetty MD    Subjective:     C/C: weakness, need for continued wound care and iv antibiotics    Interval History: Status: Improved. Up to side of bed. No family at bedside.  Tolerating treatment thus far. Pain well controlled. Renal function improving. Blood sugars remain elevated. To complete antibiotics today. Anxious for discharge.     Review of Systems   Constitution: Negative for chills, decreased appetite, weakness, malaise/fatigue, weight gain and weight loss.   HENT: Negative for congestion, ear discharge, hoarse voice and tinnitus.    Eyes: Negative for blurred vision, discharge, visual disturbance and visual halos.   Cardiovascular: Negative for chest pain, claudication, dyspnea on exertion, irregular heartbeat, leg swelling, orthopnea and paroxysmal nocturnal dyspnea.   Respiratory: Negative for cough, shortness of breath, sputum production and wheezing.    Endocrine: Negative for cold intolerance, heat intolerance and polyuria.   Hematologic/Lymphatic: Negative for adenopathy. Does not bruise/bleed easily.   Skin: Positive for poor wound healing. Negative for dry skin, itching and suspicious lesions.   Musculoskeletal: Negative for arthritis, back pain, falls, joint pain, muscle weakness and myalgias.   Gastrointestinal: Positive for constipation. Negative for abdominal pain, diarrhea, dysphagia and hematemesis.   Genitourinary: Negative for bladder incontinence, dysuria and frequency.   Neurological: Negative for aphonia, disturbances in coordination and dizziness.   Psychiatric/Behavioral: Negative for altered mental status, depression, memory loss and substance abuse. The patient does  "not have insomnia and is not nervous/anxious.        Medications:     Allergies:   Allergies   Allergen Reactions   • Bactrim [Sulfamethoxazole-Trimethoprim] Other (See Comments)     \"RENAL FAILURE\"   • Vancomycin Itching       Current Meds:   Current Facility-Administered Medications:   •  acetaminophen (TYLENOL) tablet 650 mg, 650 mg, Oral, Q4H PRN **OR** acetaminophen (TYLENOL) 160 MG/5ML solution 650 mg, 650 mg, Oral, Q4H PRN **OR** acetaminophen (TYLENOL) suppository 650 mg, 650 mg, Rectal, Q4H PRN, Juan Manuel Page MD  •  aspirin EC tablet 81 mg, 81 mg, Oral, Daily, Juan Manuel Page MD  •  bisacodyl (DULCOLAX) suppository 10 mg, 10 mg, Rectal, Daily PRN, Juan Manuel Page MD  •  bumetanide (BUMEX) tablet 0.5 mg, 0.5 mg, Oral, Daily, Juan Manuel Page MD  •  clindamycin (CLEOCIN) 900 mg in dextrose 5% 50 mL IVPB (premix), 900 mg, Intravenous, Q8H, Juan Manuel Page MD  •  dextrose (D50W) 25 g/ 50mL Intravenous Solution 25 g, 25 g, Intravenous, Q15 Min PRN, Juan Manuel Page MD  •  dextrose (GLUTOSE) oral gel 15 g, 15 g, Oral, Q15 Min PRN, Juan Manuel Page MD  •  diphenhydrAMINE (BENADRYL) injection 25 mg, 25 mg, Intravenous, Q6H PRN, Juan Manuel Page MD  •  docusate sodium (COLACE) capsule 100 mg, 100 mg, Oral, BID, Juan Manuel Page MD  •  DULoxetine (CYMBALTA) DR capsule 60 mg, 60 mg, Oral, Daily, Juan Manuel Page MD  •  enoxaparin (LOVENOX) syringe 40 mg, 40 mg, Subcutaneous, Q24H, Juan Manuel Page MD  •  gabapentin (NEURONTIN) capsule 100 mg, 100 mg, Oral, BID, Juan Manuel Page MD  •  glucagon (human recombinant) (GLUCAGEN DIAGNOSTIC) injection 1 mg, 1 mg, Subcutaneous, Q15 Min PRN, Juan Manuel Page MD  •  insulin detemir (LEVEMIR) injection 57 Units, 57 Units, Subcutaneous, Q12H, Shruthi Isabel, SUSAN  •  insulin lispro (humaLOG) injection 0-24 Units, 0-24 Units, Subcutaneous, 4x Daily With Meals & Nightly, Juan Manuel Page, " MD  •  ipratropium-albuterol (DUO-NEB) nebulizer solution 3 mL, 3 mL, Nebulization, Q6H PRN, Juan Manuel Page MD  •  lactulose solution 20 g, 20 g, Oral, BID, Juan Manuel Page MD  •  latanoprost (XALATAN) 0.005 % ophthalmic solution 1 drop, 1 drop, Both Eyes, Nightly, Juan Manuel Page MD  •  magnesium hydroxide (MILK OF MAGNESIA) suspension 2400 mg/10mL 10 mL, 10 mL, Oral, Daily PRN, Juan Manuel Page MD  •  methylnaltrexone (RELISTOR) injection 8 mg, 8 mg, Subcutaneous, Every Other Day, Juan Manuel Page MD  •  nystatin (MYCOSTATIN) powder, , Topical, Q12H, Juan Manuel Page MD  •  ondansetron (ZOFRAN) injection 4 mg, 4 mg, Intravenous, Q4H PRN, Juan Manuel Page MD  •  oxyCODONE-acetaminophen (PERCOCET) 7.5-325 MG per tablet 1 tablet, 1 tablet, Oral, Q4H PRN, Juan Manuel Page MD  •  pantoprazole (PROTONIX) EC tablet 40 mg, 40 mg, Oral, Nightly, Juan Manuel Page MD  •  pneumococcal conj. 13-valent (PREVNAR-13) vaccine 0.5 mL, 0.5 mL, Intramuscular, Once, Juan Manuel Page MD  •  polyethylene glycol 3350 powder (packet), 17 g, Oral, BID, Juan Manuel Page MD  •  rosuvastatin (CRESTOR) tablet 40 mg, 40 mg, Oral, Nightly, Juan Manuel Page MD  •  saccharomyces boulardii (FLORASTOR) capsule 250 mg, 250 mg, Oral, BID, Juan Manuel Page MD  •  sodium chloride 0.9 % flush 10 mL, 10 mL, Intravenous, Q12H, Juan Manuel Page MD  •  sodium chloride 0.9 % flush 10 mL, 10 mL, Intravenous, PRN, Juan Manuel Page MD  •  sorbitol solution 30 mL, 30 mL, Oral, Q6H PRN, Juan Manuel Page MD  •  traZODone (DESYREL) tablet 25 mg, 25 mg, Oral, Nightly PRN, Juan Manuel Page MD  •  vitamin A & D ointment, , Topical, Daily, Ivonne Staples, APRN    Data:     Code Status:    There are no questions and answers to display.       Family History   Problem Relation Age of Onset   • Colon cancer Father    • Heart disease Father    • Colon cancer Sister   "  • Colon polyps Sister    • Alzheimer's disease Mother    • Coronary artery disease Sister    • Coronary artery disease Sister        Social History     Socioeconomic History   • Marital status:      Spouse name: Not on file   • Number of children: Not on file   • Years of education: Not on file   • Highest education level: Not on file   Tobacco Use   • Smoking status: Never Smoker   • Smokeless tobacco: Never Used   • Tobacco comment: smoked in highschool   Substance and Sexual Activity   • Alcohol use: No   • Drug use: No   • Sexual activity: Defer       Vitals:  Ht 182.9 cm (72\")   Wt (!) 142 kg (312 lb 11.2 oz)   BMI 42.41 kg/m²   T 97.7 P 70 R 16 /70 Sp02 98% (room air)    I/O (24Hr):  No intake or output data in the 24 hours ending 11/18/19 1443    Labs and imaging:      Recent Results (from the past 12 hour(s))   Basic Metabolic Panel    Collection Time: 11/18/19  6:09 AM   Result Value Ref Range    Glucose 138 (H) 65 - 99 mg/dL    BUN 28 (H) 8 - 23 mg/dL    Creatinine 1.47 (H) 0.76 - 1.27 mg/dL    Sodium 139 136 - 145 mmol/L    Potassium 4.2 3.5 - 5.2 mmol/L    Chloride 101 98 - 107 mmol/L    CO2 26.0 22.0 - 29.0 mmol/L    Calcium 9.0 8.6 - 10.5 mg/dL    eGFR Non African Amer 48 (L) >60 mL/min/1.73    BUN/Creatinine Ratio 19.0 7.0 - 25.0    Anion Gap 12.0 5.0 - 15.0 mmol/L   Sedimentation Rate    Collection Time: 11/18/19  6:09 AM   Result Value Ref Range    Sed Rate 35 (H) 0 - 15 mm/hr   C-reactive Protein    Collection Time: 11/18/19  6:09 AM   Result Value Ref Range    C-Reactive Protein 0.69 (H) 0.00 - 0.50 mg/dL   CBC Auto Differential    Collection Time: 11/18/19  6:09 AM   Result Value Ref Range    WBC 6.84 3.40 - 10.80 10*3/mm3    RBC 4.05 (L) 4.14 - 5.80 10*6/mm3    Hemoglobin 11.3 (L) 13.0 - 17.7 g/dL    Hematocrit 34.8 (L) 37.5 - 51.0 %    MCV 85.9 79.0 - 97.0 fL    MCH 27.9 26.6 - 33.0 pg    MCHC 32.5 31.5 - 35.7 g/dL    RDW 14.2 12.3 - 15.4 %    RDW-SD 43.8 37.0 - 54.0 fl    MPV " 11.2 6.0 - 12.0 fL    Platelets 208 140 - 450 10*3/mm3    Neutrophil % 53.6 42.7 - 76.0 %    Lymphocyte % 25.4 19.6 - 45.3 %    Monocyte % 11.7 5.0 - 12.0 %    Eosinophil % 8.3 (H) 0.3 - 6.2 %    Basophil % 0.7 0.0 - 1.5 %    Immature Grans % 0.3 0.0 - 0.5 %    Neutrophils, Absolute 3.66 1.70 - 7.00 10*3/mm3    Lymphocytes, Absolute 1.74 0.70 - 3.10 10*3/mm3    Monocytes, Absolute 0.80 0.10 - 0.90 10*3/mm3    Eosinophils, Absolute 0.57 (H) 0.00 - 0.40 10*3/mm3    Basophils, Absolute 0.05 0.00 - 0.20 10*3/mm3    Immature Grans, Absolute 0.02 0.00 - 0.05 10*3/mm3    nRBC 0.0 0.0 - 0.2 /100 WBC   POC Glucose Once    Collection Time: 11/18/19  7:35 AM   Result Value Ref Range    Glucose 119 70 - 130 mg/dL   POC Glucose Once    Collection Time: 11/18/19 11:00 AM   Result Value Ref Range    Glucose 297 (H) 70 - 130 mg/dL     Physical Examination:        Physical Exam   Constitutional: He is oriented to person, place, and time. He appears well-developed and well-nourished.   HENT:   Head: Normocephalic and atraumatic.   Nose: Nose normal.   Mouth/Throat: Oropharynx is clear and moist.   Eyes: EOM are normal. Pupils are equal, round, and reactive to light.   Neck: Normal range of motion. Neck supple.   Cardiovascular: Normal rate, regular rhythm, normal heart sounds and intact distal pulses.   Pulmonary/Chest: Effort normal and breath sounds normal.   Abdominal: Soft. Bowel sounds are normal.   obese   Musculoskeletal: Normal range of motion.   Generalized weakness   Neurological: He is alert and oriented to person, place, and time. He has normal reflexes.   Skin: Skin is warm and dry.   Dressing c.d.i   Psychiatric: He has a normal mood and affect. His behavior is normal.   Nursing note and vitals reviewed.        Assessment:            * No active hospital problems. *    Past Medical History:   Diagnosis Date   • Arthritis    • CHF (congestive heart failure) (CMS/[facility])    • Coronary artery disease    • Diabetes mellitus  (CMS/HCC)    • Hyperlipidemia    • Hypertension    • Myocardial infarction (CMS/HCC)    • Pancreatitis    • Renal disorder    • Sleep apnea with use of continuous positive airway pressure (CPAP)         Plan:        1. Osteomyelitis right foot  2. Diabetic ulcer right foot  3. Poorly controlled DM2 with hyperglycemia on long term insulin  4. Morbid obesity  5. Acute kidney injury on CKD3  6. Constipation  7. Diabetic neuropathy  8. Chronic diastolic CHF  9. GERD  10. HLD  11. BRITTNI    Continue current treatment. Monitor counts. Increase activity. Labs Thursday. Continue antibiotics under direction of ID. Wound care per wound care team.. Glycemic control - adjust insulin regimen. Maintain patient safety. Monitor renal function.     Electronically signed by SUSAN Muñoz on 11/18/2019 at 2:43 PM

## 2019-11-18 NOTE — PROGRESS NOTES
[facility]  NIMESH Cortes M.D.  SUSAN Haney APRN      Internal Medicine Progress Note    11/17/2019   11:26 PM    Name:  Erick Luong  MRN:    [id number]     Acct:     [id number]   Room:  [address] [place] [address] Day: 0     Admit Date: 11/6/2019  4:54 PM  PCP: Del Shetty MD    Subjective:     C/C: weakness, need for continued wound care and iv antibiotics    Interval History: Status: Improved. Resting in bed.  No family at bedside. Woke from sleep. Tolerating treatment thus far. Pain well controlled.  Without issue overnight    Review of Systems   Constitution: Negative for chills, decreased appetite, weakness, malaise/fatigue, weight gain and weight loss.   HENT: Negative for congestion, ear discharge, hoarse voice and tinnitus.    Eyes: Negative for blurred vision, discharge, visual disturbance and visual halos.   Cardiovascular: Negative for chest pain, claudication, dyspnea on exertion, irregular heartbeat, leg swelling, orthopnea and paroxysmal nocturnal dyspnea.   Respiratory: Negative for cough, shortness of breath, sputum production and wheezing.    Endocrine: Negative for cold intolerance, heat intolerance and polyuria.   Hematologic/Lymphatic: Negative for adenopathy. Does not bruise/bleed easily.   Skin: Positive for poor wound healing. Negative for dry skin, itching and suspicious lesions.   Musculoskeletal: Negative for arthritis, back pain, falls, joint pain, muscle weakness and myalgias.   Gastrointestinal: Positive for constipation. Negative for abdominal pain, diarrhea, dysphagia and hematemesis.   Genitourinary: Negative for bladder incontinence, dysuria and frequency.   Neurological: Negative for aphonia, disturbances in coordination and dizziness.   Psychiatric/Behavioral: Negative for altered mental status, depression, memory loss and substance abuse. The patient does not have insomnia and is not nervous/anxious.        Medications:  "    Allergies:   Allergies   Allergen Reactions   • Bactrim [Sulfamethoxazole-Trimethoprim] Other (See Comments)     \"RENAL FAILURE\"   • Vancomycin Itching       Current Meds:   Current Facility-Administered Medications:   •  acetaminophen (TYLENOL) tablet 650 mg, 650 mg, Oral, Q4H PRN **OR** acetaminophen (TYLENOL) 160 MG/5ML solution 650 mg, 650 mg, Oral, Q4H PRN **OR** acetaminophen (TYLENOL) suppository 650 mg, 650 mg, Rectal, Q4H PRN, Juan Manuel Page MD  •  aspirin EC tablet 81 mg, 81 mg, Oral, Daily, Juan Manuel Page MD  •  bisacodyl (DULCOLAX) suppository 10 mg, 10 mg, Rectal, Daily PRN, Juan Manuel Page MD  •  bumetanide (BUMEX) tablet 0.5 mg, 0.5 mg, Oral, Daily, Juan Manuel Page MD  •  clindamycin (CLEOCIN) 900 mg in dextrose 5% 50 mL IVPB (premix), 900 mg, Intravenous, Q8H, Juan Manuel Page MD  •  dextrose (D50W) 25 g/ 50mL Intravenous Solution 25 g, 25 g, Intravenous, Q15 Min PRN, Juan Manuel Page MD  •  dextrose (GLUTOSE) oral gel 15 g, 15 g, Oral, Q15 Min PRN, Juan Manuel Page MD  •  diphenhydrAMINE (BENADRYL) injection 25 mg, 25 mg, Intravenous, Q6H PRN, Juan Manuel Page MD  •  docusate sodium (COLACE) capsule 100 mg, 100 mg, Oral, BID, Juan Manuel Page MD  •  DULoxetine (CYMBALTA) DR capsule 60 mg, 60 mg, Oral, Daily, Juan Manuel Page MD  •  enoxaparin (LOVENOX) syringe 40 mg, 40 mg, Subcutaneous, Q24H, Juan Manuel Page MD  •  gabapentin (NEURONTIN) capsule 100 mg, 100 mg, Oral, BID, Juan Manuel Page MD  •  glucagon (human recombinant) (GLUCAGEN DIAGNOSTIC) injection 1 mg, 1 mg, Subcutaneous, Q15 Min PRN, Juan Manuel Page MD  •  insulin detemir (LEVEMIR) injection 57 Units, 57 Units, Subcutaneous, Q12H, Shruthi Isabel APRN  •  insulin lispro (humaLOG) injection 0-24 Units, 0-24 Units, Subcutaneous, 4x Daily With Meals & Nightly, Juan Manuel Page MD  •  ipratropium-albuterol (DUO-NEB) nebulizer solution 3 mL, 3 " mL, Nebulization, Q6H PRN, Juan Manuel Page MD  •  lactulose solution 20 g, 20 g, Oral, BID, Juan Manuel Page MD  •  latanoprost (XALATAN) 0.005 % ophthalmic solution 1 drop, 1 drop, Both Eyes, Nightly, Juan Manuel Page MD  •  magnesium hydroxide (MILK OF MAGNESIA) suspension 2400 mg/10mL 10 mL, 10 mL, Oral, Daily PRN, Juan Manuel Page MD  •  methylnaltrexone (RELISTOR) injection 8 mg, 8 mg, Subcutaneous, Every Other Day, Juan Manuel Page MD  •  nystatin (MYCOSTATIN) powder, , Topical, Q12H, Juan Manuel Page MD  •  ondansetron (ZOFRAN) injection 4 mg, 4 mg, Intravenous, Q4H PRN, Juan Manuel Page MD  •  oxyCODONE-acetaminophen (PERCOCET) 7.5-325 MG per tablet 1 tablet, 1 tablet, Oral, Q4H PRN, Juan Manuel Page MD  •  pantoprazole (PROTONIX) EC tablet 40 mg, 40 mg, Oral, Nightly, Juan Manuel Page MD  •  pneumococcal conj. 13-valent (PREVNAR-13) vaccine 0.5 mL, 0.5 mL, Intramuscular, Once, Juan Manuel Page MD  •  polyethylene glycol 3350 powder (packet), 17 g, Oral, BID, Juan Manuel Page MD  •  rosuvastatin (CRESTOR) tablet 40 mg, 40 mg, Oral, Nightly, Juan Manuel Page MD  •  saccharomyces boulardii (FLORASTOR) capsule 250 mg, 250 mg, Oral, BID, Juan Manuel Page MD  •  sodium chloride 0.9 % flush 10 mL, 10 mL, Intravenous, Q12H, Juan Manuel Page MD  •  sodium chloride 0.9 % flush 10 mL, 10 mL, Intravenous, PRN, Juan Manuel Page MD  •  sorbitol solution 30 mL, 30 mL, Oral, Q6H PRN, Juan Manuel Page MD  •  traZODone (DESYREL) tablet 25 mg, 25 mg, Oral, Nightly PRN, Juan Manuel Page MD  •  vitamin A & D ointment, , Topical, Daily, Ivonne Staples, SUSAN    Data:     Code Status:    There are no questions and answers to display.       Family History   Problem Relation Age of Onset   • Colon cancer Father    • Heart disease Father    • Colon cancer Sister    • Colon polyps Sister    • Alzheimer's disease Mother    •  "Coronary artery disease Sister    • Coronary artery disease Sister        Social History     Socioeconomic History   • Marital status:      Spouse name: Not on file   • Number of children: Not on file   • Years of education: Not on file   • Highest education level: Not on file   Tobacco Use   • Smoking status: Never Smoker   • Smokeless tobacco: Never Used   • Tobacco comment: smoked in highschool   Substance and Sexual Activity   • Alcohol use: No   • Drug use: No   • Sexual activity: Defer       Vitals:  Ht 182.9 cm (72\")   BMI 43.29 kg/m²   Temp 97.9 pulse 81 respiratory rate 16 blood pressure 129/55 sat 99% on room air (room air)    I/O (24Hr):  No intake or output data in the 24 hours ending 11/17/19 5516    Labs and imaging:      Recent Results (from the past 12 hour(s))   POC Glucose Once    Collection Time: 11/17/19 12:05 PM   Result Value Ref Range    Glucose 195 (H) 70 - 130 mg/dL   POC Glucose Once    Collection Time: 11/17/19  5:04 PM   Result Value Ref Range    Glucose 272 (H) 70 - 130 mg/dL   POC Glucose Once    Collection Time: 11/17/19  8:23 PM   Result Value Ref Range    Glucose 352 (H) 70 - 130 mg/dL           Physical Examination:        Physical Exam   Constitutional: He is oriented to person, place, and time. He appears well-developed and well-nourished.   HENT:   Head: Normocephalic and atraumatic.   Nose: Nose normal.   Mouth/Throat: Oropharynx is clear and moist.   Eyes: EOM are normal. Pupils are equal, round, and reactive to light.   Neck: Normal range of motion. Neck supple.   Cardiovascular: Normal rate, regular rhythm, normal heart sounds and intact distal pulses.   Pulmonary/Chest: Effort normal and breath sounds normal.   Abdominal: Soft. Bowel sounds are normal.   obese   Musculoskeletal: Normal range of motion.   Generalized weakness   Neurological: He is alert and oriented to person, place, and time. He has normal reflexes.   Skin: Skin is warm and dry.   Dressing c.d.i "   Psychiatric: He has a normal mood and affect. His behavior is normal.   Nursing note and vitals reviewed.        Assessment:            * No active hospital problems. *    Past Medical History:   Diagnosis Date   • Arthritis    • CHF (congestive heart failure) (CMS/HCC)    • Coronary artery disease    • Diabetes mellitus (CMS/HCC)    • Hyperlipidemia    • Hypertension    • Myocardial infarction (CMS/HCC)    • Pancreatitis    • Renal disorder    • Sleep apnea with use of continuous positive airway pressure (CPAP)         Plan:        1. Osteomyelitis right foot  2. Diabetic ulcer right foot  3. Poorly controlled DM2 with hyperglycemia on long term insulin  4. Morbid obesity  5. Acute kidney injury on CKD3  6. Constipation  7. Diabetic neuropathy  8. Chronic diastolic CHF  9. GERD  10. HLD  11. BRITTNI    Continue current treatment. Monitor counts. Increase activity. Labs Monday. Continue antibiotics under direction of ID. Wound care per wound care team/Dr. Cerrato. Glycemic control - adjust insulin regimen. Maintain patient safety. Monitor renal function.  Recheck labs    Electronically signed by Juan Manuel Page MD on 11/17/2019 at 11:26 PM

## 2019-11-19 ENCOUNTER — APPOINTMENT (OUTPATIENT)
Dept: ONCOLOGY | Facility: HOSPITAL | Age: 64
End: 2019-11-19

## 2019-11-19 ENCOUNTER — APPOINTMENT (OUTPATIENT)
Dept: LAB | Facility: HOSPITAL | Age: 64
End: 2019-11-19

## 2019-11-19 LAB
GLUCOSE BLDC GLUCOMTR-MCNC: 118 MG/DL (ref 70–130)
GLUCOSE BLDC GLUCOMTR-MCNC: 215 MG/DL (ref 70–130)

## 2019-11-19 PROCEDURE — 82962 GLUCOSE BLOOD TEST: CPT

## 2019-11-19 PROCEDURE — 63710000001 INSULIN LISPRO (HUMAN) PER 5 UNITS: Performed by: INTERNAL MEDICINE

## 2019-11-19 PROCEDURE — 63710000001 INSULIN DETEMIR PER 5 UNITS: Performed by: INTERNAL MEDICINE

## 2019-11-19 NOTE — DISCHARGE SUMMARY
[facility]  Jaleel Page M.D.  ABUNDIO Page M.D.  SUSAN Haney APRN    Internal Medicine Discharge Summary    Patient ID: Erick Luong  MRN: [id number]     Acct:  [id number]       Patient's PCP: Del Shetty MD    Admit Date: 11/6/2019     Discharge Date:   11/19/19    Admitting Physician: Juan Manuel Page MD    Discharge Physician: SUSAN Muñoz     Active Discharge Diagnoses:  1. Osteomyelitis right foot  2. Diabetic ulcer right foot  3. Poorly controlled DM2 with hyperglycemia on long term insulin  4. Morbid obesity  5. Acute kidney injury on CKD3  6. Constipation  7. Diabetic neuropathy  8. Chronic diastolic CHF  9. GERD  10. HLD  11. BRITTNI     Hospital Problems    * No active hospital problems. *     Past Medical History:   Diagnosis Date   • Arthritis    • CHF (congestive heart failure) (CMS/HCC)    • Coronary artery disease    • Diabetes mellitus (CMS/HCC)    • Hyperlipidemia    • Hypertension    • Myocardial infarction (CMS/[facility])    • Pancreatitis    • Renal disorder    • Sleep apnea with use of continuous positive airway pressure (CPAP)        The patient was seen and examined on the day of discharge and this discharge summary is in conjunction with any daily progress note from day of discharge.    Code Status:    There are no questions and answers to display.       Hospital Course: Erick Luong is a  64 y.o.  male who presents with need for continued wound care, IV antibiotics and rehabilitation following recent acute care stay. The patient has a chronic right lower extremity wound followed as an outpatient by Dr. Cerrato at the wound care clinic at [facility]. He had been receiving IV vancomycin at home (with a start date of 10/8 with plan for 6 weeks of antibiotics) and undergoing hyperbaric therapy to the wound. He developed worsening dyspnea and orthopnea with associated chest pain and presented to ER for evaluation and treatment. The  patient also reported constipation despite the use of laxatives at home. He was found to have evidence of acute kidney injury with acute volume overload. He was placed on IV vancomycin and Iv Rocephin as well as a bumex drip and admitted to the service of the hospitalists. He responded well to diuresis. Cardiac workup remained negative. The patient continued wound care with recommendations for limited weight bearing to the RLE. The patient had a possible allergic reaction to vancomycin and a rapid response was called. He was treated with antihistamines and steroids with improvement in his symptoms. Vancomycin was discontinued. ID was consulted for antibiotic management. Rocephin was discontinued and the patient was placed on clindamycin. Venous doppler showed no evidence of DVT or SVT. Renal function returned to baseline with intermittent diuresis. Due to his need for continued antibiotics through 11/18 under direction of ID and wound care, he transferred to our facility.   The patient completed IV antibiotic course of cleocin without difficulty. His renal function improved and stabilized. His blood sugars have remained elevated and changes to his insulin regimen have been undertaken. He was followed closely by Dr. Tan and SUSAN Casey of our wound care team with improvement in the appearance of the wound. Bowel movements have normalized with current bowel regimen. The patient has remained independent for ADLs and is now felt stable for discharge to home with close outpatient follow up for continued wound care.     Consults:  Dr. Tan (wound care)  Dr. Adrian (ID)  Dr. Hartley (nephrology)    Disposition: home     Physical Exam   Constitutional: He is oriented to person, place, and time. He appears well-developed and well-nourished.   HENT:   Head: Normocephalic and atraumatic.   Nose: Nose normal.   Mouth/Throat: Oropharynx is clear and moist.   Eyes: EOM are normal. Pupils are equal, round, and  reactive to light.   Neck: Normal range of motion. Neck supple.   Cardiovascular: Normal rate, regular rhythm, normal heart sounds and intact distal pulses.   Pulmonary/Chest: Effort normal and breath sounds normal.   Abdominal: Soft. Bowel sounds are normal.   obese   Musculoskeletal: Normal range of motion.   Generalized weakness   Neurological: He is alert and oriented to person, place, and time. He has normal reflexes.   Skin: Skin is warm and dry.   Dressing c.d.i   Psychiatric: He has a normal mood and affect. His behavior is normal.   Nursing note and vitals reviewed.    Discharged Condition: Stable    Follow Up: PCP 1 week  Dr. Cerrato/wound care 1 week    Diet: Diet Regular; Consistent Carbohydrate, Renal, Low Potassium    Discharge Medications:   See computer generated medication reconciliation form  Time Spent on discharge is  32 minutes in patient examination, evaluation, patient/family counseling as well as medication reconciliation, prescriptions for required medications, discharge plan and follow up.     Electronically signed by SUSAN Muñoz on 11/19/2019 at 1:42 PM   I have discussed the care of Erick Luong, including pertinent history and exam findings, with the nurse practitioner.    I have seen and examined the patient and the key elements of all parts of the encounter have been performed by me.  I agree with the assessment, plan and orders as documented by SUSAN Haney, after I modified the exam findings and the plan of treatments and the final version is my approved version of the assessment.        Electronically signed by Juan Manuel Page MD on 11/20/2019 at 12:55 AM

## 2019-11-21 ENCOUNTER — APPOINTMENT (OUTPATIENT)
Dept: WOUND CARE | Facility: HOSPITAL | Age: 64
End: 2019-11-21

## 2019-12-03 ENCOUNTER — TRANSCRIBE ORDERS (OUTPATIENT)
Dept: ADMINISTRATIVE | Facility: HOSPITAL | Age: 64
End: 2019-12-03

## 2019-12-03 ENCOUNTER — HOSPITAL ENCOUNTER (OUTPATIENT)
Dept: ULTRASOUND IMAGING | Facility: HOSPITAL | Age: 64
Discharge: HOME | End: 2019-12-03
Admitting: FAMILY MEDICINE

## 2019-12-03 ENCOUNTER — LAB REQUISITION (OUTPATIENT)
Dept: LAB | Facility: HOSPITAL | Age: 64
End: 2019-12-03

## 2019-12-03 ENCOUNTER — OFFICE VISIT (OUTPATIENT)
Dept: WOUND CARE | Facility: HOSPITAL | Age: 64
End: 2019-12-03

## 2019-12-03 ENCOUNTER — HOSPITAL ENCOUNTER (OUTPATIENT)
Dept: ULTRASOUND IMAGING | Facility: HOSPITAL | Age: 64
Discharge: HOME | End: 2019-12-03
Admitting: PODIATRIST

## 2019-12-03 DIAGNOSIS — R22.32 LOCALIZED SWELLING, MASS AND LUMP, UPPER LIMB, LEFT: Primary | ICD-10-CM

## 2019-12-03 DIAGNOSIS — R22.32 LOCALIZED SWELLING, MASS AND LUMP, UPPER LIMB, LEFT: ICD-10-CM

## 2019-12-03 DIAGNOSIS — Z00.00 ENCOUNTER FOR GENERAL ADULT MEDICAL EXAMINATION WITHOUT ABNORMAL FINDINGS: ICD-10-CM

## 2019-12-03 PROCEDURE — 87205 SMEAR GRAM STAIN: CPT | Performed by: PODIATRIST

## 2019-12-03 PROCEDURE — 87070 CULTURE OTHR SPECIMN AEROBIC: CPT | Performed by: PODIATRIST

## 2019-12-03 PROCEDURE — 93971 EXTREMITY STUDY: CPT

## 2019-12-03 PROCEDURE — 93971 EXTREMITY STUDY: CPT | Performed by: SURGERY

## 2019-12-03 PROCEDURE — 87176 TISSUE HOMOGENIZATION CULTR: CPT | Performed by: PODIATRIST

## 2019-12-03 PROCEDURE — 87075 CULTR BACTERIA EXCEPT BLOOD: CPT | Performed by: PODIATRIST

## 2019-12-06 LAB
BACTERIA SPEC AEROBE CULT: NORMAL
GRAM STN SPEC: NORMAL

## 2019-12-08 LAB — BACTERIA SPEC ANAEROBE CULT: NORMAL

## 2019-12-10 ENCOUNTER — OFFICE VISIT (OUTPATIENT)
Dept: WOUND CARE | Facility: HOSPITAL | Age: 64
End: 2019-12-10

## 2019-12-17 ENCOUNTER — LAB REQUISITION (OUTPATIENT)
Dept: LAB | Facility: HOSPITAL | Age: 64
End: 2019-12-17

## 2019-12-17 ENCOUNTER — OFFICE VISIT (OUTPATIENT)
Dept: WOUND CARE | Facility: HOSPITAL | Age: 64
End: 2019-12-17

## 2019-12-17 ENCOUNTER — TRANSCRIBE ORDERS (OUTPATIENT)
Dept: ADMINISTRATIVE | Facility: HOSPITAL | Age: 64
End: 2019-12-17

## 2019-12-17 ENCOUNTER — HOSPITAL ENCOUNTER (OUTPATIENT)
Dept: GENERAL RADIOLOGY | Facility: HOSPITAL | Age: 64
Discharge: HOME | End: 2019-12-17
Admitting: PODIATRIST

## 2019-12-17 DIAGNOSIS — L97.509 DIABETIC FOOT ULCER WITH OSTEOMYELITIS (HCC): Primary | ICD-10-CM

## 2019-12-17 DIAGNOSIS — M86.9 DIABETIC FOOT ULCER WITH OSTEOMYELITIS (HCC): Primary | ICD-10-CM

## 2019-12-17 DIAGNOSIS — M86.671 CHRONIC OSTEOMYELITIS OF HINDFOOT, RIGHT (HCC): ICD-10-CM

## 2019-12-17 DIAGNOSIS — Z00.00 ENCOUNTER FOR GENERAL ADULT MEDICAL EXAMINATION WITHOUT ABNORMAL FINDINGS: ICD-10-CM

## 2019-12-17 DIAGNOSIS — E11.621 DIABETIC FOOT ULCER WITH OSTEOMYELITIS (HCC): Primary | ICD-10-CM

## 2019-12-17 DIAGNOSIS — E11.69 DIABETIC FOOT ULCER WITH OSTEOMYELITIS (HCC): Primary | ICD-10-CM

## 2019-12-17 PROCEDURE — 87070 CULTURE OTHR SPECIMN AEROBIC: CPT | Performed by: PODIATRIST

## 2019-12-17 PROCEDURE — 87186 SC STD MICRODIL/AGAR DIL: CPT | Performed by: PODIATRIST

## 2019-12-17 PROCEDURE — 73630 X-RAY EXAM OF FOOT: CPT

## 2019-12-17 PROCEDURE — 87075 CULTR BACTERIA EXCEPT BLOOD: CPT | Performed by: PODIATRIST

## 2019-12-17 PROCEDURE — 87205 SMEAR GRAM STAIN: CPT | Performed by: PODIATRIST

## 2019-12-17 PROCEDURE — 87176 TISSUE HOMOGENIZATION CULTR: CPT | Performed by: PODIATRIST

## 2019-12-17 PROCEDURE — 87147 CULTURE TYPE IMMUNOLOGIC: CPT | Performed by: PODIATRIST

## 2019-12-20 LAB
BACTERIA SPEC AEROBE CULT: ABNORMAL
BACTERIA SPEC AEROBE CULT: ABNORMAL
GRAM STN SPEC: ABNORMAL
GRAM STN SPEC: ABNORMAL

## 2019-12-22 LAB — BACTERIA SPEC ANAEROBE CULT: NORMAL

## 2019-12-26 ENCOUNTER — APPOINTMENT (OUTPATIENT)
Dept: WOUND CARE | Facility: HOSPITAL | Age: 64
End: 2019-12-26

## 2019-12-31 ENCOUNTER — OFFICE VISIT (OUTPATIENT)
Dept: WOUND CARE | Facility: HOSPITAL | Age: 64
End: 2019-12-31

## 2020-01-06 ENCOUNTER — APPOINTMENT (OUTPATIENT)
Dept: WOUND CARE | Facility: HOSPITAL | Age: 65
End: 2020-01-06

## 2020-01-07 ENCOUNTER — OFFICE VISIT (OUTPATIENT)
Dept: WOUND CARE | Facility: HOSPITAL | Age: 65
End: 2020-01-07

## 2020-01-07 PROCEDURE — G0463 HOSPITAL OUTPT CLINIC VISIT: HCPCS

## 2020-01-08 ENCOUNTER — OFFICE VISIT (OUTPATIENT)
Dept: WOUND CARE | Facility: HOSPITAL | Age: 65
End: 2020-01-08

## 2020-01-08 PROCEDURE — G0277 HBOT, FULL BODY CHAMBER, 30M: HCPCS

## 2020-01-09 ENCOUNTER — OFFICE VISIT (OUTPATIENT)
Dept: WOUND CARE | Facility: HOSPITAL | Age: 65
End: 2020-01-09

## 2020-01-09 PROCEDURE — G0277 HBOT, FULL BODY CHAMBER, 30M: HCPCS

## 2020-01-10 ENCOUNTER — OFFICE VISIT (OUTPATIENT)
Dept: WOUND CARE | Facility: HOSPITAL | Age: 65
End: 2020-01-10

## 2020-01-10 PROCEDURE — G0277 HBOT, FULL BODY CHAMBER, 30M: HCPCS

## 2020-01-13 ENCOUNTER — OFFICE VISIT (OUTPATIENT)
Dept: WOUND CARE | Facility: HOSPITAL | Age: 65
End: 2020-01-13

## 2020-01-13 PROCEDURE — G0277 HBOT, FULL BODY CHAMBER, 30M: HCPCS

## 2020-01-14 ENCOUNTER — APPOINTMENT (OUTPATIENT)
Dept: WOUND CARE | Facility: HOSPITAL | Age: 65
End: 2020-01-14

## 2020-01-15 ENCOUNTER — APPOINTMENT (OUTPATIENT)
Dept: WOUND CARE | Facility: HOSPITAL | Age: 65
End: 2020-01-15

## 2020-01-16 ENCOUNTER — APPOINTMENT (OUTPATIENT)
Dept: WOUND CARE | Facility: HOSPITAL | Age: 65
End: 2020-01-16

## 2020-01-17 ENCOUNTER — HOSPITAL ENCOUNTER (EMERGENCY)
Facility: HOSPITAL | Age: 65
Discharge: HOME | End: 2020-01-18
Admitting: INTERNAL MEDICINE

## 2020-01-17 DIAGNOSIS — R10.9 ABDOMINAL PAIN, UNSPECIFIED ABDOMINAL LOCATION: ICD-10-CM

## 2020-01-17 DIAGNOSIS — R11.2 INTRACTABLE VOMITING WITH NAUSEA, UNSPECIFIED VOMITING TYPE: Primary | ICD-10-CM

## 2020-01-17 LAB
ALBUMIN SERPL-MCNC: 4.3 G/DL (ref 3.5–5.2)
ALBUMIN/GLOB SERPL: 1.6 G/DL
ALP SERPL-CCNC: 62 U/L (ref 39–117)
ALT SERPL W P-5'-P-CCNC: 19 U/L (ref 1–41)
ANION GAP SERPL CALCULATED.3IONS-SCNC: 12 MMOL/L (ref 5–15)
AST SERPL-CCNC: 19 U/L (ref 1–40)
BASOPHILS # BLD AUTO: 0.08 10*3/MM3 (ref 0–0.2)
BASOPHILS NFR BLD AUTO: 0.6 % (ref 0–1.5)
BILIRUB SERPL-MCNC: 0.2 MG/DL (ref 0.2–1.2)
BUN BLD-MCNC: 41 MG/DL (ref 8–23)
BUN/CREAT SERPL: 25 (ref 7–25)
CALCIUM SPEC-SCNC: 9.2 MG/DL (ref 8.6–10.5)
CHLORIDE SERPL-SCNC: 101 MMOL/L (ref 98–107)
CO2 SERPL-SCNC: 24 MMOL/L (ref 22–29)
CREAT BLD-MCNC: 1.64 MG/DL (ref 0.76–1.27)
DEPRECATED RDW RBC AUTO: 43.5 FL (ref 37–54)
EOSINOPHIL # BLD AUTO: 0.46 10*3/MM3 (ref 0–0.4)
EOSINOPHIL NFR BLD AUTO: 3.4 % (ref 0.3–6.2)
ERYTHROCYTE [DISTWIDTH] IN BLOOD BY AUTOMATED COUNT: 14 % (ref 12.3–15.4)
GFR SERPL CREATININE-BSD FRML MDRD: 43 ML/MIN/1.73
GLOBULIN UR ELPH-MCNC: 2.7 GM/DL
GLUCOSE BLD-MCNC: 310 MG/DL (ref 65–99)
HCT VFR BLD AUTO: 33.4 % (ref 37.5–51)
HGB BLD-MCNC: 11.4 G/DL (ref 13–17.7)
IMM GRANULOCYTES # BLD AUTO: 0.09 10*3/MM3 (ref 0–0.05)
IMM GRANULOCYTES NFR BLD AUTO: 0.7 % (ref 0–0.5)
LIPASE SERPL-CCNC: 72 U/L (ref 13–60)
LYMPHOCYTES # BLD AUTO: 2.51 10*3/MM3 (ref 0.7–3.1)
LYMPHOCYTES NFR BLD AUTO: 18.6 % (ref 19.6–45.3)
MCH RBC QN AUTO: 29.1 PG (ref 26.6–33)
MCHC RBC AUTO-ENTMCNC: 34.1 G/DL (ref 31.5–35.7)
MCV RBC AUTO: 85.2 FL (ref 79–97)
MONOCYTES # BLD AUTO: 1.23 10*3/MM3 (ref 0.1–0.9)
MONOCYTES NFR BLD AUTO: 9.1 % (ref 5–12)
NEUTROPHILS # BLD AUTO: 9.14 10*3/MM3 (ref 1.7–7)
NEUTROPHILS NFR BLD AUTO: 67.6 % (ref 42.7–76)
NRBC BLD AUTO-RTO: 0 /100 WBC (ref 0–0.2)
PLATELET # BLD AUTO: 250 10*3/MM3 (ref 140–450)
PMV BLD AUTO: 11.2 FL (ref 6–12)
POTASSIUM BLD-SCNC: 4.4 MMOL/L (ref 3.5–5.2)
PROT SERPL-MCNC: 7 G/DL (ref 6–8.5)
RBC # BLD AUTO: 3.92 10*6/MM3 (ref 4.14–5.8)
SODIUM BLD-SCNC: 137 MMOL/L (ref 136–145)
WBC NRBC COR # BLD: 13.51 10*3/MM3 (ref 3.4–10.8)

## 2020-01-17 PROCEDURE — 99284 EMERGENCY DEPT VISIT MOD MDM: CPT

## 2020-01-17 PROCEDURE — 83690 ASSAY OF LIPASE: CPT | Performed by: INTERNAL MEDICINE

## 2020-01-17 PROCEDURE — 25010000002 ONDANSETRON PER 1 MG: Performed by: INTERNAL MEDICINE

## 2020-01-17 PROCEDURE — 85025 COMPLETE CBC W/AUTO DIFF WBC: CPT | Performed by: INTERNAL MEDICINE

## 2020-01-17 PROCEDURE — 96374 THER/PROPH/DIAG INJ IV PUSH: CPT

## 2020-01-17 PROCEDURE — 80053 COMPREHEN METABOLIC PANEL: CPT | Performed by: INTERNAL MEDICINE

## 2020-01-17 PROCEDURE — 83880 ASSAY OF NATRIURETIC PEPTIDE: CPT | Performed by: NURSE PRACTITIONER

## 2020-01-17 RX ORDER — SODIUM CHLORIDE 0.9 % (FLUSH) 0.9 %
10 SYRINGE (ML) INJECTION AS NEEDED
Status: DISCONTINUED | OUTPATIENT
Start: 2020-01-17 | End: 2020-01-18 | Stop reason: HOSPADM

## 2020-01-17 RX ORDER — ONDANSETRON 2 MG/ML
4 INJECTION INTRAMUSCULAR; INTRAVENOUS ONCE
Status: COMPLETED | OUTPATIENT
Start: 2020-01-17 | End: 2020-01-17

## 2020-01-17 RX ADMIN — ONDANSETRON HYDROCHLORIDE 4 MG: 2 SOLUTION INTRAMUSCULAR; INTRAVENOUS at 23:27

## 2020-01-17 RX ADMIN — SODIUM CHLORIDE 1000 ML: 9 INJECTION, SOLUTION INTRAVENOUS at 23:27

## 2020-01-18 ENCOUNTER — APPOINTMENT (OUTPATIENT)
Dept: CT IMAGING | Facility: HOSPITAL | Age: 65
End: 2020-01-18

## 2020-01-18 VITALS
HEIGHT: 72 IN | TEMPERATURE: 97.5 F | DIASTOLIC BLOOD PRESSURE: 64 MMHG | RESPIRATION RATE: 16 BRPM | OXYGEN SATURATION: 97 % | SYSTOLIC BLOOD PRESSURE: 128 MMHG | BODY MASS INDEX: 42.66 KG/M2 | WEIGHT: 315 LBS | HEART RATE: 72 BPM

## 2020-01-18 LAB
BACTERIA UR QL AUTO: ABNORMAL /HPF
BILIRUB UR QL STRIP: NEGATIVE
CLARITY UR: CLEAR
COLOR UR: YELLOW
GLUCOSE UR STRIP-MCNC: ABNORMAL MG/DL
HGB UR QL STRIP.AUTO: NEGATIVE
HOLD SPECIMEN: NORMAL
HYALINE CASTS UR QL AUTO: ABNORMAL /LPF
KETONES UR QL STRIP: NEGATIVE
LEUKOCYTE ESTERASE UR QL STRIP.AUTO: NEGATIVE
NITRITE UR QL STRIP: NEGATIVE
NT-PROBNP SERPL-MCNC: 849.3 PG/ML (ref 5–900)
PH UR STRIP.AUTO: <=5 [PH] (ref 5–8)
PROT UR QL STRIP: ABNORMAL
RBC # UR: ABNORMAL /HPF
REF LAB TEST METHOD: ABNORMAL
SP GR UR STRIP: 1.02 (ref 1–1.03)
SQUAMOUS #/AREA URNS HPF: ABNORMAL /HPF
UROBILINOGEN UR QL STRIP: ABNORMAL
WBC UR QL AUTO: ABNORMAL /HPF
WHOLE BLOOD HOLD SPECIMEN: NORMAL
WHOLE BLOOD HOLD SPECIMEN: NORMAL

## 2020-01-18 PROCEDURE — 81001 URINALYSIS AUTO W/SCOPE: CPT | Performed by: NURSE PRACTITIONER

## 2020-01-18 PROCEDURE — 96376 TX/PRO/DX INJ SAME DRUG ADON: CPT

## 2020-01-18 PROCEDURE — 74176 CT ABD & PELVIS W/O CONTRAST: CPT

## 2020-01-18 PROCEDURE — 96375 TX/PRO/DX INJ NEW DRUG ADDON: CPT

## 2020-01-18 PROCEDURE — 25010000002 ONDANSETRON PER 1 MG: Performed by: NURSE PRACTITIONER

## 2020-01-18 RX ORDER — ONDANSETRON 4 MG/1
4 TABLET, ORALLY DISINTEGRATING ORAL EVERY 6 HOURS PRN
Qty: 12 TABLET | Refills: 0 | Status: ON HOLD | OUTPATIENT
Start: 2020-01-18 | End: 2020-01-22

## 2020-01-18 RX ORDER — ONDANSETRON 2 MG/ML
4 INJECTION INTRAMUSCULAR; INTRAVENOUS ONCE
Status: COMPLETED | OUTPATIENT
Start: 2020-01-18 | End: 2020-01-18

## 2020-01-18 RX ADMIN — HYDROMORPHONE HYDROCHLORIDE 1 MG: 1 INJECTION, SOLUTION INTRAMUSCULAR; INTRAVENOUS; SUBCUTANEOUS at 02:07

## 2020-01-18 RX ADMIN — ONDANSETRON HYDROCHLORIDE 4 MG: 2 SOLUTION INTRAMUSCULAR; INTRAVENOUS at 02:08

## 2020-01-18 NOTE — ED PROVIDER NOTES
"Subjective   64 yom c/o n/v and abdomen pain.  He also states his feet have been swelling and he has had a fever. Onset 3 days ago. He denies dysuria or diarrhea.  No unusual SOB or CP.  He has DM and a wound to the plantar side of the R foot that wound care has been treating.            Review of Systems   Constitutional: Negative for activity change, appetite change, fatigue and fever.   HENT: Negative for congestion, ear pain, facial swelling and sore throat.    Eyes: Negative for discharge and visual disturbance.   Respiratory: Negative for apnea, chest tightness, shortness of breath, wheezing and stridor.    Cardiovascular: Negative for chest pain and palpitations.   Gastrointestinal: Positive for abdominal pain, nausea and vomiting. Negative for abdominal distention and diarrhea.   Genitourinary: Negative for difficulty urinating and dysuria.   Musculoskeletal: Negative for arthralgias and myalgias.   Skin: Negative for rash and wound.   Neurological: Negative for dizziness and seizures.   Psychiatric/Behavioral: Negative for agitation and confusion.       Past Medical History:   Diagnosis Date   • Arthritis    • CHF (congestive heart failure) (CMS/HCC)    • Coronary artery disease    • Diabetes mellitus (CMS/HCC)    • Hyperlipidemia    • Hypertension    • Myocardial infarction (CMS/HCC)    • Pancreatitis    • Renal disorder    • Sleep apnea with use of continuous positive airway pressure (CPAP)        Allergies   Allergen Reactions   • Bactrim [Sulfamethoxazole-Trimethoprim] Other (See Comments)     \"RENAL FAILURE\"   • Vancomycin Itching       Past Surgical History:   Procedure Laterality Date   • ABDOMINAL SURGERY     • APPENDECTOMY     • BACK SURGERY     • CARDIAC SURGERY     • CATARACT EXTRACTION     • CERVICAL SPINE SURGERY     • COLONOSCOPY N/A 1/31/2017    Normal exam repeat in 5 years   • COLONOSCOPY N/A 2/11/2019    Procedure: COLONOSCOPY WITH ANESTHESIA;  Surgeon: Tyrell Smith MD;  Location: [facility]" [facility] END[facility];  Service: Gastroenterology   • COLONOSCOPY W/ POLYPECTOMY  03/04/2014    Hyperplastic polyp   • CORONARY ARTERY BYPASS GRAFT  10/2015   • ENDOSCOPY  04/13/2011    Gastritis with hemorrhage   • ENDOSCOPY N/A 5/5/2017    Normal exam   • ENDOSCOPY N/A 2/11/2019    Procedure: ESOPHAGOGASTRODUODENOSCOPY WITH ANESTHESIA;  Surgeon: Tyrell Smith MD;  Location: [facility] ENDOSCOPY;  Service: Gastroenterology   • INCISION AND DRAINAGE OF WOUND Left 09/2007    spider bite       Family History   Problem Relation Age of Onset   • Colon cancer Father    • Heart disease Father    • Colon cancer Sister    • Colon polyps Sister    • Alzheimer's disease Mother    • Coronary artery disease Sister    • Coronary artery disease Sister        Social History     Socioeconomic History   • Marital status:      Spouse name: Not on file   • Number of children: Not on file   • Years of education: Not on file   • Highest education level: Not on file   Tobacco Use   • Smoking status: Never Smoker   • Smokeless tobacco: Never Used   • Tobacco comment: smoked in highschool   Substance and Sexual Activity   • Alcohol use: No   • Drug use: No   • Sexual activity: Defer           Objective   Physical Exam   Constitutional: He is oriented to person, place, and time. He appears well-developed.   HENT:   Head: Normocephalic.   Eyes: Pupils are equal, round, and reactive to light. EOM are normal.   Neck: Normal range of motion. Neck supple.   Cardiovascular: Normal rate and regular rhythm.   No murmur heard.  Non pitting edema present to BLE   Pulmonary/Chest: Effort normal and breath sounds normal.   Abdominal: Soft. Bowel sounds are normal. He exhibits no distension. There is no splenomegaly. There is no tenderness. There is no rigidity and no guarding.   Musculoskeletal: Normal range of motion.   Neurological: He is alert and oriented to person, place, and time.   Skin: Skin is warm and dry.   Ulcer present to plantar side of  the R foot.  Area is approximately 1.5cm in size.     Psychiatric: He has a normal mood and affect.   Nursing note and vitals reviewed.      Procedures          No current facility-administered medications for this encounter.     Current Outpatient Medications:   •  aspirin 81 MG EC tablet, Take 81 mg by mouth Daily., Disp: , Rfl:   •  bumetanide (BUMEX) 2 MG tablet, Take 1 tablet by mouth 2 (Two) Times a Day As Needed (edema). (Patient taking differently: Take 2 mg by mouth 2 (Two) Times a Day.), Disp: 30 tablet, Rfl: 2  •  DULoxetine (CYMBALTA) 60 MG capsule, Take 60 mg by mouth Daily., Disp: , Rfl:   •  gabapentin (NEURONTIN) 100 MG capsule, Take 100 mg by mouth 2 (Two) Times a Day., Disp: , Rfl:   •  insulin glargine (LANTUS) 100 UNIT/ML injection, Inject 20 Units under the skin into the appropriate area as directed Every Night., Disp: , Rfl:   •  insulin regular (humuLIN R) 500 UNIT/ML CONCENTRATED injection, Inject 100 Units under the skin 3 (Three) Times a Day Before Meals. Packaging states 100 units with regular meals; 120 units with large meals or 360 units daily, Disp: , Rfl:   •  lactulose (CHRONULAC) 10 GM/15ML solution, Take 20 g by mouth 3 (Three) Times a Day As Needed., Disp: , Rfl:   •  latanoprost (XALATAN) 0.005 % ophthalmic solution, Administer 1 drop to both eyes Every Night., Disp: , Rfl:   •  losartan (COZAAR) 100 MG tablet, Take 100 mg by mouth Daily., Disp: , Rfl:   •  ondansetron ODT (ZOFRAN-ODT) 4 MG disintegrating tablet, Take 1 tablet by mouth Every 6 (Six) Hours As Needed for Nausea or Vomiting for up to 3 days., Disp: 12 tablet, Rfl: 0  •  oxyCODONE-acetaminophen (PERCOCET)  MG per tablet, Take 1 tablet by mouth 2 (Two) Times a Day., Disp: , Rfl:   •  pantoprazole (PROTONIX) 40 MG EC tablet, Take 1 tablet by mouth Daily., Disp: 90 tablet, Rfl: 3  •  rosuvastatin (CRESTOR) 40 MG tablet, Take 40 mg by mouth Daily., Disp: , Rfl:   •  saccharomyces boulardii (FLORASTOR) 250 MG  "capsule, Take 250 mg by mouth 2 (Two) Times a Day., Disp: , Rfl:   •  traZODone (DESYREL) 50 MG tablet, Take 25-50 mg by mouth At Night As Needed for Sleep., Disp: , Rfl:     Vital signs:  /64 (BP Location: Left arm, Patient Position: Lying)   Pulse 72   Temp 97.5 °F (36.4 °C) (Oral)   Resp 16   Ht 182.9 cm (72\")   Wt (!) 160 kg (352 lb)   SpO2 97%   BMI 47.74 kg/m²        ED LAB RESULTS:   Lab Results (last 24 hours)     Procedure Component Value Units Date/Time    CBC & Differential [[id number]] Collected:  01/17/20 2312    Specimen:  Blood Updated:  01/17/20 2320    Narrative:       The following orders were created for panel order CBC & Differential.  Procedure                               Abnormality         Status                     ---------                               -----------         ------                     CBC Auto Differential[[id number]]        Abnormal            Final result                 Please view results for these tests on the individual orders.    Comprehensive Metabolic Panel [[id number]]  (Abnormal) Collected:  01/17/20 2312    Specimen:  Blood Updated:  01/17/20 2335     Glucose 310 mg/dL      BUN 41 mg/dL      Creatinine 1.64 mg/dL      Sodium 137 mmol/L      Potassium 4.4 mmol/L      Chloride 101 mmol/L      CO2 24.0 mmol/L      Calcium 9.2 mg/dL      Total Protein 7.0 g/dL      Albumin 4.30 g/dL      ALT (SGPT) 19 U/L      AST (SGOT) 19 U/L      Alkaline Phosphatase 62 U/L      Total Bilirubin 0.2 mg/dL      eGFR Non African Amer 43 mL/min/1.73      Globulin 2.7 gm/dL      A/G Ratio 1.6 g/dL      BUN/Creatinine Ratio 25.0     Anion Gap 12.0 mmol/L     Narrative:       GFR Normal >60  Chronic Kidney Disease <60  Kidney Failure <15      Lipase [[id number]]  (Abnormal) Collected:  01/17/20 2312    Specimen:  Blood Updated:  01/17/20 2335     Lipase 72 U/L     CBC Auto Differential [[id number]]  (Abnormal) Collected:  01/17/20 2312    Specimen:  Blood Updated:  01/17/20 2320 "     WBC 13.51 10*3/mm3      RBC 3.92 10*6/mm3      Hemoglobin 11.4 g/dL      Hematocrit 33.4 %      MCV 85.2 fL      MCH 29.1 pg      MCHC 34.1 g/dL      RDW 14.0 %      RDW-SD 43.5 fl      MPV 11.2 fL      Platelets 250 10*3/mm3      Neutrophil % 67.6 %      Lymphocyte % 18.6 %      Monocyte % 9.1 %      Eosinophil % 3.4 %      Basophil % 0.6 %      Immature Grans % 0.7 %      Neutrophils, Absolute 9.14 10*3/mm3      Lymphocytes, Absolute 2.51 10*3/mm3      Monocytes, Absolute 1.23 10*3/mm3      Eosinophils, Absolute 0.46 10*3/mm3      Basophils, Absolute 0.08 10*3/mm3      Immature Grans, Absolute 0.09 10*3/mm3      nRBC 0.0 /100 WBC     BNP [[id number]]  (Normal) Collected:  01/17/20 2312    Specimen:  Blood Updated:  01/18/20 0155     proBNP 849.3 pg/mL     Narrative:       Among patients with dyspnea, NT-proBNP is highly sensitive for the detection of acute congestive heart failure. In addition NT-proBNP of <300 pg/ml effectively rules out acute congestive heart failure with 99% negative predictive value.    Results may be falsely decreased if patient taking Biotin.      [place] Urine - Urine, Clean Catch [[id number]] Collected:  01/18/20 0019    Specimen:  Urine, Clean Catch Updated:  01/18/20 0130     Extra Tube Hold for add-ons.     Comment: Auto resulted.       Urinalysis With Culture If Indicated - Urine, Clean Catch [[id number]]  (Abnormal) Collected:  01/18/20 0019    Specimen:  Urine, Clean Catch Updated:  01/18/20 0118     Color, UA Yellow     Appearance, UA Clear     pH, UA <=5.0     Specific Gravity, UA 1.023     Glucose, UA >=1000 mg/dL (3+)     Ketones, UA Negative     Bilirubin, UA Negative     Blood, UA Negative     Protein, UA 30 mg/dL (1+)     Leuk Esterase, UA Negative     Nitrite, UA Negative     Urobilinogen, UA 0.2 E.U./dL    Urinalysis, Microscopic Only - Urine, Clean Catch [[id number]]  (Abnormal) Collected:  01/18/20 0019    Specimen:  Urine, Clean Catch Updated:  01/18/20 0118     RBC, UA  0-2 /HPF      WBC, UA 3-5 /HPF      Bacteria, UA None Seen /HPF      Squamous Epithelial Cells, UA 0-2 /HPF      Hyaline Casts, UA 0-2 /LPF      Methodology Automated Microscopy             IMAGING RESULTS  CT Abdomen Pelvis Without Contrast   Final Result   1. 1 cm right lower lobe pulmonary nodule, adjacent to the mediastinal   pleura. Fleischner Society Recommendations for Management of SOLID   Pulmonary Nodules:       If a nodule is greater than 8 mm in size, ALL patients require follow-up   at 3, 9 and 24 months. PET/CT or biopsy should also be considered.       2. No acute process in the abdomen or pelvis.   3. Prior cholecystectomy.   4. Partially exophytic cystic lesion of the right inferior renal pole is   poorly evaluated without contrast, however, this imaged as a simple cyst   on a prior contrast enhanced exam.   5. Advanced coronary atheromatous calcification.   6. These findings are in agreement with the critical and emergent   findings from the [facility] preliminary report.           This report was finalized on 01/18/2020 08:49 by Dr King Ceballos, .                       ED Course  ED Course as of Jan 18 1706   Sat Jan 18, 2020   0240 I discussed the patient's history, assessment and testing results with Dr Page.  He will be dc'd home and f/u with PCP on Monday.  I discussed the patient's testing results and d'c instructions with him and his wife.  They voice understanding of both.    [KS]      ED Course User Index  [KS] Susi Yepez APRN                      Randa Coma Scale Score: 15                          MDM  Number of Diagnoses or Management Options  Abdominal pain, unspecified abdominal location: new and does not require workup  Intractable vomiting with nausea, unspecified vomiting type: new and does not require workup     Amount and/or Complexity of Data Reviewed  Clinical lab tests: ordered and reviewed  Tests in the radiology section of CPT®: ordered and reviewed  Decide  to obtain previous medical records or to obtain history from someone other than the patient: yes  Discuss the patient with other providers: yes    Risk of Complications, Morbidity, and/or Mortality  Presenting problems: minimal  Diagnostic procedures: minimal  Management options: minimal    Patient Progress  Patient progress: stable      Final diagnoses:   Intractable vomiting with nausea, unspecified vomiting type   Abdominal pain, unspecified abdominal location            Susi Yepez, SUSAN  01/18/20 5209

## 2020-01-18 NOTE — DISCHARGE INSTRUCTIONS
Increase fluids - clear liquids and advance diet as tolerated.  Limit sodium. Tylenol as needed for pain/fever.  Continue home medication. New medication as ordered. Monitor blood sugar. Elevate legs when possible.  Continue dressing changes as instructed by wound care. Follow up with PCP Monday - call for appointment. Return to ED if condition does not improve or worsens

## 2020-01-20 ENCOUNTER — HOSPITAL ENCOUNTER (EMERGENCY)
Facility: HOSPITAL | Age: 65
Discharge: HOME | End: 2020-01-20
Admitting: INTERNAL MEDICINE

## 2020-01-20 ENCOUNTER — APPOINTMENT (OUTPATIENT)
Dept: WOUND CARE | Facility: HOSPITAL | Age: 65
End: 2020-01-20

## 2020-01-20 ENCOUNTER — APPOINTMENT (OUTPATIENT)
Dept: GENERAL RADIOLOGY | Facility: HOSPITAL | Age: 65
End: 2020-01-20

## 2020-01-20 VITALS
WEIGHT: 315 LBS | HEART RATE: 73 BPM | RESPIRATION RATE: 16 BRPM | SYSTOLIC BLOOD PRESSURE: 131 MMHG | TEMPERATURE: 98.2 F | OXYGEN SATURATION: 96 % | HEIGHT: 72 IN | DIASTOLIC BLOOD PRESSURE: 53 MMHG | BODY MASS INDEX: 42.66 KG/M2

## 2020-01-20 DIAGNOSIS — M25.512 ACUTE PAIN OF LEFT SHOULDER: Primary | ICD-10-CM

## 2020-01-20 PROCEDURE — 99283 EMERGENCY DEPT VISIT LOW MDM: CPT

## 2020-01-20 PROCEDURE — 96372 THER/PROPH/DIAG INJ SC/IM: CPT

## 2020-01-20 PROCEDURE — 25010000002 ONDANSETRON PER 1 MG: Performed by: NURSE PRACTITIONER

## 2020-01-20 PROCEDURE — 73030 X-RAY EXAM OF SHOULDER: CPT

## 2020-01-20 RX ORDER — ONDANSETRON 2 MG/ML
4 INJECTION INTRAMUSCULAR; INTRAVENOUS ONCE
Status: COMPLETED | OUTPATIENT
Start: 2020-01-20 | End: 2020-01-20

## 2020-01-20 RX ADMIN — HYDROMORPHONE HYDROCHLORIDE 1 MG: 1 INJECTION, SOLUTION INTRAMUSCULAR; INTRAVENOUS; SUBCUTANEOUS at 02:27

## 2020-01-20 RX ADMIN — ONDANSETRON HYDROCHLORIDE 4 MG: 2 SOLUTION INTRAMUSCULAR; INTRAVENOUS at 02:26

## 2020-01-20 NOTE — DISCHARGE INSTRUCTIONS
Increase fluids.  Continue home medication.  Ice to area.  Follow up with PCP Monday - call for appointment. Return to ED if condition does not improve or worsens.

## 2020-01-21 ENCOUNTER — APPOINTMENT (OUTPATIENT)
Dept: GENERAL RADIOLOGY | Facility: HOSPITAL | Age: 65
End: 2020-01-21

## 2020-01-21 ENCOUNTER — APPOINTMENT (OUTPATIENT)
Dept: CT IMAGING | Facility: HOSPITAL | Age: 65
End: 2020-01-21

## 2020-01-21 ENCOUNTER — HOSPITAL ENCOUNTER (INPATIENT)
Facility: HOSPITAL | Age: 65
LOS: 3 days | Discharge: HOME | End: 2020-01-25
Attending: INTERNAL MEDICINE | Admitting: FAMILY MEDICINE

## 2020-01-21 DIAGNOSIS — N13.9 OBSTRUCTIVE UROPATHY: ICD-10-CM

## 2020-01-21 DIAGNOSIS — N40.1 BENIGN PROSTATIC HYPERPLASIA WITH URINARY RETENTION: ICD-10-CM

## 2020-01-21 DIAGNOSIS — M86.9 OSTEOMYELITIS, UNSPECIFIED SITE, UNSPECIFIED TYPE (HCC): Primary | ICD-10-CM

## 2020-01-21 DIAGNOSIS — D72.829 LEUKOCYTOSIS, UNSPECIFIED TYPE: ICD-10-CM

## 2020-01-21 DIAGNOSIS — R33.8 BENIGN PROSTATIC HYPERPLASIA WITH URINARY RETENTION: ICD-10-CM

## 2020-01-21 LAB
ALBUMIN SERPL-MCNC: 4.5 G/DL (ref 3.5–5.2)
ALBUMIN/GLOB SERPL: 1.5 G/DL
ALP SERPL-CCNC: 72 U/L (ref 39–117)
ALT SERPL W P-5'-P-CCNC: 24 U/L (ref 1–41)
ANION GAP SERPL CALCULATED.3IONS-SCNC: 13 MMOL/L (ref 5–15)
APTT PPP: 28 SECONDS (ref 24.1–35)
AST SERPL-CCNC: 15 U/L (ref 1–40)
BACTERIA UR QL AUTO: ABNORMAL /HPF
BASOPHILS # BLD AUTO: 0.06 10*3/MM3 (ref 0–0.2)
BASOPHILS NFR BLD AUTO: 0.3 % (ref 0–1.5)
BILIRUB SERPL-MCNC: 0.6 MG/DL (ref 0.2–1.2)
BILIRUB UR QL STRIP: NEGATIVE
BUN BLD-MCNC: 37 MG/DL (ref 8–23)
BUN/CREAT SERPL: 19.3 (ref 7–25)
CALCIUM SPEC-SCNC: 9.5 MG/DL (ref 8.6–10.5)
CHLORIDE SERPL-SCNC: 97 MMOL/L (ref 98–107)
CK SERPL-CCNC: 190 U/L (ref 20–200)
CLARITY UR: CLEAR
CO2 SERPL-SCNC: 28 MMOL/L (ref 22–29)
COLOR UR: YELLOW
CREAT BLD-MCNC: 1.92 MG/DL (ref 0.76–1.27)
CRP SERPL-MCNC: 2.43 MG/DL (ref 0–0.5)
D-LACTATE SERPL-SCNC: 1.6 MMOL/L (ref 0.5–2)
DEPRECATED RDW RBC AUTO: 44.6 FL (ref 37–54)
EOSINOPHIL # BLD AUTO: 0.23 10*3/MM3 (ref 0–0.4)
EOSINOPHIL NFR BLD AUTO: 1.1 % (ref 0.3–6.2)
ERYTHROCYTE [DISTWIDTH] IN BLOOD BY AUTOMATED COUNT: 14.5 % (ref 12.3–15.4)
ERYTHROCYTE [SEDIMENTATION RATE] IN BLOOD: 30 MM/HR (ref 0–15)
FLUAV AG NPH QL: NEGATIVE
FLUBV AG NPH QL IA: NEGATIVE
GFR SERPL CREATININE-BSD FRML MDRD: 36 ML/MIN/1.73
GLOBULIN UR ELPH-MCNC: 3.1 GM/DL
GLUCOSE BLD-MCNC: 142 MG/DL (ref 65–99)
GLUCOSE UR STRIP-MCNC: NEGATIVE MG/DL
HCT VFR BLD AUTO: 35.9 % (ref 37.5–51)
HGB BLD-MCNC: 12.2 G/DL (ref 13–17.7)
HGB UR QL STRIP.AUTO: NEGATIVE
HYALINE CASTS UR QL AUTO: ABNORMAL /LPF
IMM GRANULOCYTES # BLD AUTO: 0.16 10*3/MM3 (ref 0–0.05)
IMM GRANULOCYTES NFR BLD AUTO: 0.8 % (ref 0–0.5)
INR PPP: 1 (ref 0.91–1.09)
KETONES UR QL STRIP: NEGATIVE
LEUKOCYTE ESTERASE UR QL STRIP.AUTO: ABNORMAL
LIPASE SERPL-CCNC: 60 U/L (ref 13–60)
LYMPHOCYTES # BLD AUTO: 1.64 10*3/MM3 (ref 0.7–3.1)
LYMPHOCYTES NFR BLD AUTO: 7.9 % (ref 19.6–45.3)
MAGNESIUM SERPL-MCNC: 2.1 MG/DL (ref 1.6–2.4)
MCH RBC QN AUTO: 29 PG (ref 26.6–33)
MCHC RBC AUTO-ENTMCNC: 34 G/DL (ref 31.5–35.7)
MCV RBC AUTO: 85.5 FL (ref 79–97)
MONOCYTES # BLD AUTO: 1.7 10*3/MM3 (ref 0.1–0.9)
MONOCYTES NFR BLD AUTO: 8.2 % (ref 5–12)
NEUTROPHILS # BLD AUTO: 16.85 10*3/MM3 (ref 1.7–7)
NEUTROPHILS NFR BLD AUTO: 81.7 % (ref 42.7–76)
NITRITE UR QL STRIP: NEGATIVE
NRBC BLD AUTO-RTO: 0 /100 WBC (ref 0–0.2)
NT-PROBNP SERPL-MCNC: 1125 PG/ML (ref 5–900)
PH UR STRIP.AUTO: <=5 [PH] (ref 5–8)
PLATELET # BLD AUTO: 263 10*3/MM3 (ref 140–450)
PMV BLD AUTO: 10.5 FL (ref 6–12)
POTASSIUM BLD-SCNC: 4.1 MMOL/L (ref 3.5–5.2)
PROT SERPL-MCNC: 7.6 G/DL (ref 6–8.5)
PROT UR QL STRIP: ABNORMAL
PROTHROMBIN TIME: 13.5 SECONDS (ref 11.9–14.6)
RBC # BLD AUTO: 4.2 10*6/MM3 (ref 4.14–5.8)
RBC # UR: ABNORMAL /HPF
REF LAB TEST METHOD: ABNORMAL
SODIUM BLD-SCNC: 138 MMOL/L (ref 136–145)
SP GR UR STRIP: 1.01 (ref 1–1.03)
SQUAMOUS #/AREA URNS HPF: ABNORMAL /HPF
TROPONIN T SERPL-MCNC: 0.01 NG/ML (ref 0–0.03)
TSH SERPL DL<=0.05 MIU/L-ACNC: 0.68 UIU/ML (ref 0.27–4.2)
UROBILINOGEN UR QL STRIP: ABNORMAL
WBC NRBC COR # BLD: 20.64 10*3/MM3 (ref 3.4–10.8)
WBC UR QL AUTO: ABNORMAL /HPF

## 2020-01-21 PROCEDURE — 87077 CULTURE AEROBIC IDENTIFY: CPT | Performed by: PHYSICIAN ASSISTANT

## 2020-01-21 PROCEDURE — 85025 COMPLETE CBC W/AUTO DIFF WBC: CPT | Performed by: PHYSICIAN ASSISTANT

## 2020-01-21 PROCEDURE — 81001 URINALYSIS AUTO W/SCOPE: CPT | Performed by: PHYSICIAN ASSISTANT

## 2020-01-21 PROCEDURE — 83880 ASSAY OF NATRIURETIC PEPTIDE: CPT | Performed by: PHYSICIAN ASSISTANT

## 2020-01-21 PROCEDURE — 86140 C-REACTIVE PROTEIN: CPT | Performed by: PHYSICIAN ASSISTANT

## 2020-01-21 PROCEDURE — 72050 X-RAY EXAM NECK SPINE 4/5VWS: CPT

## 2020-01-21 PROCEDURE — 84484 ASSAY OF TROPONIN QUANT: CPT | Performed by: PHYSICIAN ASSISTANT

## 2020-01-21 PROCEDURE — 82550 ASSAY OF CK (CPK): CPT | Performed by: PHYSICIAN ASSISTANT

## 2020-01-21 PROCEDURE — 87086 URINE CULTURE/COLONY COUNT: CPT | Performed by: PHYSICIAN ASSISTANT

## 2020-01-21 PROCEDURE — 83735 ASSAY OF MAGNESIUM: CPT | Performed by: PHYSICIAN ASSISTANT

## 2020-01-21 PROCEDURE — 84443 ASSAY THYROID STIM HORMONE: CPT | Performed by: PHYSICIAN ASSISTANT

## 2020-01-21 PROCEDURE — 36415 COLL VENOUS BLD VENIPUNCTURE: CPT

## 2020-01-21 PROCEDURE — 83036 HEMOGLOBIN GLYCOSYLATED A1C: CPT | Performed by: INTERNAL MEDICINE

## 2020-01-21 PROCEDURE — 87186 SC STD MICRODIL/AGAR DIL: CPT | Performed by: PHYSICIAN ASSISTANT

## 2020-01-21 PROCEDURE — 80053 COMPREHEN METABOLIC PANEL: CPT | Performed by: PHYSICIAN ASSISTANT

## 2020-01-21 PROCEDURE — 73030 X-RAY EXAM OF SHOULDER: CPT

## 2020-01-21 PROCEDURE — 85610 PROTHROMBIN TIME: CPT | Performed by: PHYSICIAN ASSISTANT

## 2020-01-21 PROCEDURE — 99284 EMERGENCY DEPT VISIT MOD MDM: CPT

## 2020-01-21 PROCEDURE — 36415 COLL VENOUS BLD VENIPUNCTURE: CPT | Performed by: PHYSICIAN ASSISTANT

## 2020-01-21 PROCEDURE — 93005 ELECTROCARDIOGRAM TRACING: CPT | Performed by: PHYSICIAN ASSISTANT

## 2020-01-21 PROCEDURE — 84100 ASSAY OF PHOSPHORUS: CPT | Performed by: INTERNAL MEDICINE

## 2020-01-21 PROCEDURE — 85651 RBC SED RATE NONAUTOMATED: CPT | Performed by: PHYSICIAN ASSISTANT

## 2020-01-21 PROCEDURE — 71045 X-RAY EXAM CHEST 1 VIEW: CPT

## 2020-01-21 PROCEDURE — 93010 ELECTROCARDIOGRAM REPORT: CPT | Performed by: INTERNAL MEDICINE

## 2020-01-21 PROCEDURE — 74176 CT ABD & PELVIS W/O CONTRAST: CPT

## 2020-01-21 PROCEDURE — 83605 ASSAY OF LACTIC ACID: CPT | Performed by: PHYSICIAN ASSISTANT

## 2020-01-21 PROCEDURE — 85730 THROMBOPLASTIN TIME PARTIAL: CPT | Performed by: PHYSICIAN ASSISTANT

## 2020-01-21 PROCEDURE — 51798 US URINE CAPACITY MEASURE: CPT

## 2020-01-21 PROCEDURE — 73630 X-RAY EXAM OF FOOT: CPT

## 2020-01-21 PROCEDURE — 83690 ASSAY OF LIPASE: CPT | Performed by: PHYSICIAN ASSISTANT

## 2020-01-21 PROCEDURE — 87804 INFLUENZA ASSAY W/OPTIC: CPT | Performed by: PHYSICIAN ASSISTANT

## 2020-01-21 PROCEDURE — 74019 RADEX ABDOMEN 2 VIEWS: CPT

## 2020-01-21 PROCEDURE — 84145 PROCALCITONIN (PCT): CPT | Performed by: PHYSICIAN ASSISTANT

## 2020-01-22 PROBLEM — G47.00 PERSISTENT INSOMNIA: Status: ACTIVE | Noted: 2020-01-20

## 2020-01-22 PROBLEM — N39.0 UTI (URINARY TRACT INFECTION), BACTERIAL: Status: ACTIVE | Noted: 2020-01-22

## 2020-01-22 PROBLEM — N17.9 AKI (ACUTE KIDNEY INJURY): Status: ACTIVE | Noted: 2020-01-22

## 2020-01-22 PROBLEM — N18.30 ACUTE RENAL FAILURE SUPERIMPOSED ON STAGE 3 CHRONIC KIDNEY DISEASE: Status: ACTIVE | Noted: 2020-01-22

## 2020-01-22 PROBLEM — M86.9 OSTEOMYELITIS: Status: ACTIVE | Noted: 2020-01-22

## 2020-01-22 PROBLEM — G90.9 DISORDER OF AUTONOMIC NERVOUS SYSTEM: Status: ACTIVE | Noted: 2019-09-16

## 2020-01-22 PROBLEM — K21.9 GASTROESOPHAGEAL REFLUX DISEASE: Status: ACTIVE | Noted: 2019-05-13

## 2020-01-22 PROBLEM — N17.9 ACUTE RENAL FAILURE SUPERIMPOSED ON STAGE 3 CHRONIC KIDNEY DISEASE: Status: ACTIVE | Noted: 2020-01-22

## 2020-01-22 PROBLEM — R33.8 ACUTE URINARY RETENTION: Status: ACTIVE | Noted: 2020-01-22

## 2020-01-22 PROBLEM — M79.89 SWELLING OF UPPER EXTREMITY: Status: ACTIVE | Noted: 2019-12-03

## 2020-01-22 PROBLEM — R60.0 EDEMA OF LOWER EXTREMITY: Status: ACTIVE | Noted: 2020-01-20

## 2020-01-22 PROBLEM — A49.9 UTI (URINARY TRACT INFECTION), BACTERIAL: Status: ACTIVE | Noted: 2020-01-22

## 2020-01-22 PROBLEM — G47.30 SLEEP APNEA WITH USE OF CONTINUOUS POSITIVE AIRWAY PRESSURE (CPAP): Status: ACTIVE | Noted: 2020-01-22

## 2020-01-22 PROBLEM — R60.1 ANASARCA: Status: ACTIVE | Noted: 2020-01-22

## 2020-01-22 PROBLEM — E78.5 DYSLIPIDEMIA: Status: ACTIVE | Noted: 2019-05-13

## 2020-01-22 PROBLEM — M54.9 CHRONIC BACK PAIN: Status: ACTIVE | Noted: 2019-06-27

## 2020-01-22 PROBLEM — G89.29 CHRONIC BACK PAIN: Status: ACTIVE | Noted: 2019-06-27

## 2020-01-22 LAB
ANION GAP SERPL CALCULATED.3IONS-SCNC: 15 MMOL/L (ref 5–15)
BACTERIA UR QL AUTO: ABNORMAL /HPF
BASOPHILS # BLD AUTO: 0.07 10*3/MM3 (ref 0–0.2)
BASOPHILS NFR BLD AUTO: 0.3 % (ref 0–1.5)
BILIRUB UR QL STRIP: NEGATIVE
BUN BLD-MCNC: 39 MG/DL (ref 8–23)
BUN/CREAT SERPL: 19.5 (ref 7–25)
CALCIUM SPEC-SCNC: 8.9 MG/DL (ref 8.6–10.5)
CHLORIDE SERPL-SCNC: 97 MMOL/L (ref 98–107)
CLARITY UR: ABNORMAL
CO2 SERPL-SCNC: 22 MMOL/L (ref 22–29)
COLOR UR: YELLOW
CREAT BLD-MCNC: 2 MG/DL (ref 0.76–1.27)
CREAT UR-MCNC: 53.5 MG/DL
DEPRECATED RDW RBC AUTO: 44 FL (ref 37–54)
EOSINOPHIL # BLD AUTO: 0.04 10*3/MM3 (ref 0–0.4)
EOSINOPHIL NFR BLD AUTO: 0.2 % (ref 0.3–6.2)
ERYTHROCYTE [DISTWIDTH] IN BLOOD BY AUTOMATED COUNT: 14.5 % (ref 12.3–15.4)
ERYTHROCYTE [SEDIMENTATION RATE] IN BLOOD: 22 MM/HR (ref 0–15)
GFR SERPL CREATININE-BSD FRML MDRD: 34 ML/MIN/1.73
GLUCOSE BLD-MCNC: 105 MG/DL (ref 65–99)
GLUCOSE BLDC GLUCOMTR-MCNC: 170 MG/DL (ref 70–130)
GLUCOSE BLDC GLUCOMTR-MCNC: 245 MG/DL (ref 70–130)
GLUCOSE BLDC GLUCOMTR-MCNC: 384 MG/DL (ref 70–130)
GLUCOSE BLDC GLUCOMTR-MCNC: 385 MG/DL (ref 70–130)
GLUCOSE UR STRIP-MCNC: NEGATIVE MG/DL
HBA1C MFR BLD: 8.7 % (ref 4.8–5.6)
HCT VFR BLD AUTO: 32.9 % (ref 37.5–51)
HGB BLD-MCNC: 11.3 G/DL (ref 13–17.7)
HGB UR QL STRIP.AUTO: ABNORMAL
HYALINE CASTS UR QL AUTO: ABNORMAL /LPF
IMM GRANULOCYTES # BLD AUTO: 0.14 10*3/MM3 (ref 0–0.05)
IMM GRANULOCYTES NFR BLD AUTO: 0.6 % (ref 0–0.5)
KETONES UR QL STRIP: NEGATIVE
LEUKOCYTE ESTERASE UR QL STRIP.AUTO: ABNORMAL
LYMPHOCYTES # BLD AUTO: 1.53 10*3/MM3 (ref 0.7–3.1)
LYMPHOCYTES NFR BLD AUTO: 6.9 % (ref 19.6–45.3)
MCH RBC QN AUTO: 28.8 PG (ref 26.6–33)
MCHC RBC AUTO-ENTMCNC: 34.3 G/DL (ref 31.5–35.7)
MCV RBC AUTO: 83.9 FL (ref 79–97)
MONOCYTES # BLD AUTO: 1.94 10*3/MM3 (ref 0.1–0.9)
MONOCYTES NFR BLD AUTO: 8.8 % (ref 5–12)
NEUTROPHILS # BLD AUTO: 18.39 10*3/MM3 (ref 1.7–7)
NEUTROPHILS NFR BLD AUTO: 83.2 % (ref 42.7–76)
NITRITE UR QL STRIP: NEGATIVE
NRBC BLD AUTO-RTO: 0 /100 WBC (ref 0–0.2)
PH UR STRIP.AUTO: <=5 [PH] (ref 5–8)
PHOSPHATE SERPL-MCNC: 3.4 MG/DL (ref 2.5–4.5)
PLATELET # BLD AUTO: 257 10*3/MM3 (ref 140–450)
PMV BLD AUTO: 10.8 FL (ref 6–12)
POTASSIUM BLD-SCNC: 3.8 MMOL/L (ref 3.5–5.2)
PROCALCITONIN SERPL-MCNC: 0.14 NG/ML (ref 0.1–0.25)
PROT UR QL STRIP: ABNORMAL
PROT UR-MCNC: 34 MG/DL
RBC # BLD AUTO: 3.92 10*6/MM3 (ref 4.14–5.8)
RBC # UR: ABNORMAL /HPF
REF LAB TEST METHOD: ABNORMAL
SODIUM BLD-SCNC: 134 MMOL/L (ref 136–145)
SODIUM UR-SCNC: 87 MMOL/L
SP GR UR STRIP: 1.01 (ref 1–1.03)
SQUAMOUS #/AREA URNS HPF: ABNORMAL /HPF
UROBILINOGEN UR QL STRIP: ABNORMAL
UUN 24H UR-MCNC: 300 MG/DL
WBC NRBC COR # BLD: 22.11 10*3/MM3 (ref 3.4–10.8)
WBC UR QL AUTO: ABNORMAL /HPF

## 2020-01-22 PROCEDURE — 63710000001 INSULIN DETEMIR PER 5 UNITS: Performed by: INTERNAL MEDICINE

## 2020-01-22 PROCEDURE — 81001 URINALYSIS AUTO W/SCOPE: CPT | Performed by: INTERNAL MEDICINE

## 2020-01-22 PROCEDURE — 25010000002 ENOXAPARIN PER 10 MG: Performed by: INTERNAL MEDICINE

## 2020-01-22 PROCEDURE — 25010000002 CEFEPIME PER 500 MG: Performed by: INTERNAL MEDICINE

## 2020-01-22 PROCEDURE — 80048 BASIC METABOLIC PNL TOTAL CA: CPT | Performed by: INTERNAL MEDICINE

## 2020-01-22 PROCEDURE — 82962 GLUCOSE BLOOD TEST: CPT

## 2020-01-22 PROCEDURE — 25010000002 ONDANSETRON PER 1 MG: Performed by: INTERNAL MEDICINE

## 2020-01-22 PROCEDURE — 82570 ASSAY OF URINE CREATININE: CPT | Performed by: NURSE PRACTITIONER

## 2020-01-22 PROCEDURE — 84300 ASSAY OF URINE SODIUM: CPT | Performed by: NURSE PRACTITIONER

## 2020-01-22 PROCEDURE — 94799 UNLISTED PULMONARY SVC/PX: CPT

## 2020-01-22 PROCEDURE — 87040 BLOOD CULTURE FOR BACTERIA: CPT | Performed by: INTERNAL MEDICINE

## 2020-01-22 PROCEDURE — 99254 IP/OBS CNSLTJ NEW/EST MOD 60: CPT | Performed by: UROLOGY

## 2020-01-22 PROCEDURE — 84156 ASSAY OF PROTEIN URINE: CPT | Performed by: NURSE PRACTITIONER

## 2020-01-22 PROCEDURE — 25010000002 MORPHINE PER 10 MG: Performed by: INTERNAL MEDICINE

## 2020-01-22 PROCEDURE — 84540 ASSAY OF URINE/UREA-N: CPT | Performed by: NURSE PRACTITIONER

## 2020-01-22 PROCEDURE — 99024 POSTOP FOLLOW-UP VISIT: CPT | Performed by: UROLOGY

## 2020-01-22 PROCEDURE — 25010000002 CEFEPIME PER 500 MG: Performed by: PHYSICIAN ASSISTANT

## 2020-01-22 PROCEDURE — 63710000001 INSULIN LISPRO (HUMAN) PER 5 UNITS: Performed by: INTERNAL MEDICINE

## 2020-01-22 PROCEDURE — 85651 RBC SED RATE NONAUTOMATED: CPT | Performed by: INTERNAL MEDICINE

## 2020-01-22 PROCEDURE — 85025 COMPLETE CBC W/AUTO DIFF WBC: CPT | Performed by: INTERNAL MEDICINE

## 2020-01-22 PROCEDURE — 87086 URINE CULTURE/COLONY COUNT: CPT | Performed by: INTERNAL MEDICINE

## 2020-01-22 PROCEDURE — 99222 1ST HOSP IP/OBS MODERATE 55: CPT | Performed by: PODIATRIST

## 2020-01-22 PROCEDURE — 25010000002 LINEZOLID 600 MG/300ML SOLUTION: Performed by: FAMILY MEDICINE

## 2020-01-22 RX ORDER — TRAZODONE HYDROCHLORIDE 100 MG/1
100 TABLET ORAL NIGHTLY PRN
COMMUNITY
End: 2021-01-18

## 2020-01-22 RX ORDER — ROSUVASTATIN CALCIUM 20 MG/1
40 TABLET, COATED ORAL DAILY
Status: DISCONTINUED | OUTPATIENT
Start: 2020-01-22 | End: 2020-01-25 | Stop reason: HOSPADM

## 2020-01-22 RX ORDER — DEXTROSE MONOHYDRATE 25 G/50ML
25 INJECTION, SOLUTION INTRAVENOUS
Status: DISCONTINUED | OUTPATIENT
Start: 2020-01-22 | End: 2020-01-25 | Stop reason: HOSPADM

## 2020-01-22 RX ORDER — SODIUM CHLORIDE 0.9 % (FLUSH) 0.9 %
10 SYRINGE (ML) INJECTION EVERY 12 HOURS SCHEDULED
Status: DISCONTINUED | OUTPATIENT
Start: 2020-01-22 | End: 2020-01-25 | Stop reason: HOSPADM

## 2020-01-22 RX ORDER — SACCHAROMYCES BOULARDII 250 MG
250 CAPSULE ORAL 2 TIMES DAILY
Status: DISCONTINUED | OUTPATIENT
Start: 2020-01-22 | End: 2020-01-25 | Stop reason: HOSPADM

## 2020-01-22 RX ORDER — ACETAMINOPHEN 160 MG/5ML
650 SOLUTION ORAL EVERY 4 HOURS PRN
Status: DISCONTINUED | OUTPATIENT
Start: 2020-01-22 | End: 2020-01-25 | Stop reason: HOSPADM

## 2020-01-22 RX ORDER — ASPIRIN 81 MG/1
81 TABLET ORAL DAILY
Status: DISCONTINUED | OUTPATIENT
Start: 2020-01-22 | End: 2020-01-25 | Stop reason: HOSPADM

## 2020-01-22 RX ORDER — LACTULOSE 20 G/30ML
20 SOLUTION ORAL 3 TIMES DAILY PRN
Status: DISCONTINUED | OUTPATIENT
Start: 2020-01-22 | End: 2020-01-25 | Stop reason: HOSPADM

## 2020-01-22 RX ORDER — TRAZODONE HYDROCHLORIDE 50 MG/1
25 TABLET ORAL NIGHTLY PRN
Status: DISCONTINUED | OUTPATIENT
Start: 2020-01-22 | End: 2020-01-25 | Stop reason: HOSPADM

## 2020-01-22 RX ORDER — IBUPROFEN 400 MG/1
400 TABLET ORAL ONCE
Status: COMPLETED | OUTPATIENT
Start: 2020-01-22 | End: 2020-01-22

## 2020-01-22 RX ORDER — IRBESARTAN 300 MG/1
300 TABLET ORAL NIGHTLY
COMMUNITY
End: 2020-01-25 | Stop reason: HOSPADM

## 2020-01-22 RX ORDER — LINEZOLID 2 MG/ML
600 INJECTION, SOLUTION INTRAVENOUS 2 TIMES DAILY
Status: DISCONTINUED | OUTPATIENT
Start: 2020-01-22 | End: 2020-01-23

## 2020-01-22 RX ORDER — ONDANSETRON 4 MG/1
4 TABLET, FILM COATED ORAL EVERY 6 HOURS PRN
Status: DISCONTINUED | OUTPATIENT
Start: 2020-01-22 | End: 2020-01-25 | Stop reason: HOSPADM

## 2020-01-22 RX ORDER — LATANOPROST 50 UG/ML
1 SOLUTION/ DROPS OPHTHALMIC NIGHTLY
Status: DISCONTINUED | OUTPATIENT
Start: 2020-01-22 | End: 2020-01-25 | Stop reason: HOSPADM

## 2020-01-22 RX ORDER — NICOTINE POLACRILEX 4 MG
15 LOZENGE BUCCAL
Status: DISCONTINUED | OUTPATIENT
Start: 2020-01-22 | End: 2020-01-25 | Stop reason: HOSPADM

## 2020-01-22 RX ORDER — ONDANSETRON 4 MG/1
4 TABLET, ORALLY DISINTEGRATING ORAL EVERY 6 HOURS PRN
Status: DISCONTINUED | OUTPATIENT
Start: 2020-01-22 | End: 2020-01-25 | Stop reason: HOSPADM

## 2020-01-22 RX ORDER — OXYCODONE AND ACETAMINOPHEN 10; 325 MG/1; MG/1
1 TABLET ORAL 2 TIMES DAILY
Status: DISCONTINUED | OUTPATIENT
Start: 2020-01-22 | End: 2020-01-25 | Stop reason: HOSPADM

## 2020-01-22 RX ORDER — LOSARTAN POTASSIUM 50 MG/1
100 TABLET ORAL DAILY
Status: DISCONTINUED | OUTPATIENT
Start: 2020-01-22 | End: 2020-01-25 | Stop reason: HOSPADM

## 2020-01-22 RX ORDER — PANTOPRAZOLE SODIUM 40 MG/1
40 TABLET, DELAYED RELEASE ORAL DAILY
Status: DISCONTINUED | OUTPATIENT
Start: 2020-01-22 | End: 2020-01-25 | Stop reason: HOSPADM

## 2020-01-22 RX ORDER — ACETAMINOPHEN 500 MG
1000 TABLET ORAL ONCE
Status: COMPLETED | OUTPATIENT
Start: 2020-01-22 | End: 2020-01-22

## 2020-01-22 RX ORDER — DULOXETIN HYDROCHLORIDE 30 MG/1
60 CAPSULE, DELAYED RELEASE ORAL DAILY
Status: DISCONTINUED | OUTPATIENT
Start: 2020-01-22 | End: 2020-01-25 | Stop reason: HOSPADM

## 2020-01-22 RX ORDER — ONDANSETRON 2 MG/ML
4 INJECTION INTRAMUSCULAR; INTRAVENOUS EVERY 6 HOURS PRN
Status: DISCONTINUED | OUTPATIENT
Start: 2020-01-22 | End: 2020-01-25 | Stop reason: HOSPADM

## 2020-01-22 RX ORDER — BUMETANIDE 2 MG/1
2 TABLET ORAL
Status: DISCONTINUED | OUTPATIENT
Start: 2020-01-22 | End: 2020-01-23

## 2020-01-22 RX ORDER — ACETAMINOPHEN 325 MG/1
650 TABLET ORAL EVERY 4 HOURS PRN
Status: DISCONTINUED | OUTPATIENT
Start: 2020-01-22 | End: 2020-01-25 | Stop reason: HOSPADM

## 2020-01-22 RX ORDER — CLONIDINE HYDROCHLORIDE 0.1 MG/1
0.1 TABLET ORAL 2 TIMES DAILY PRN
COMMUNITY
End: 2020-09-24

## 2020-01-22 RX ORDER — OXYCODONE AND ACETAMINOPHEN 7.5; 325 MG/1; MG/1
1 TABLET ORAL EVERY 4 HOURS PRN
COMMUNITY
End: 2020-01-27 | Stop reason: ALTCHOICE

## 2020-01-22 RX ORDER — ACETAMINOPHEN 650 MG/1
650 SUPPOSITORY RECTAL EVERY 4 HOURS PRN
Status: DISCONTINUED | OUTPATIENT
Start: 2020-01-22 | End: 2020-01-25 | Stop reason: HOSPADM

## 2020-01-22 RX ORDER — OXYCODONE HYDROCHLORIDE AND ACETAMINOPHEN 5; 325 MG/1; MG/1
1 TABLET ORAL EVERY 6 HOURS PRN
Status: DISCONTINUED | OUTPATIENT
Start: 2020-01-22 | End: 2020-01-25 | Stop reason: HOSPADM

## 2020-01-22 RX ORDER — ONDANSETRON 2 MG/ML
4 INJECTION INTRAMUSCULAR; INTRAVENOUS ONCE
Status: COMPLETED | OUTPATIENT
Start: 2020-01-22 | End: 2020-01-22

## 2020-01-22 RX ORDER — GABAPENTIN 100 MG/1
100 CAPSULE ORAL 2 TIMES DAILY
Status: DISCONTINUED | OUTPATIENT
Start: 2020-01-22 | End: 2020-01-25 | Stop reason: HOSPADM

## 2020-01-22 RX ORDER — MIDODRINE HYDROCHLORIDE 10 MG/1
10 TABLET ORAL 3 TIMES DAILY
COMMUNITY
End: 2020-01-27 | Stop reason: ALTCHOICE

## 2020-01-22 RX ORDER — BUMETANIDE 2 MG/1
2 TABLET ORAL 2 TIMES DAILY PRN
Status: DISCONTINUED | OUTPATIENT
Start: 2020-01-22 | End: 2020-01-22

## 2020-01-22 RX ORDER — SODIUM CHLORIDE 0.9 % (FLUSH) 0.9 %
10 SYRINGE (ML) INJECTION AS NEEDED
Status: DISCONTINUED | OUTPATIENT
Start: 2020-01-22 | End: 2020-01-25 | Stop reason: HOSPADM

## 2020-01-22 RX ADMIN — OXYCODONE HYDROCHLORIDE AND ACETAMINOPHEN 1 TABLET: 5; 325 TABLET ORAL at 18:20

## 2020-01-22 RX ADMIN — TRAZODONE HYDROCHLORIDE 25 MG: 50 TABLET ORAL at 22:26

## 2020-01-22 RX ADMIN — ONDANSETRON HYDROCHLORIDE 4 MG: 2 SOLUTION INTRAMUSCULAR; INTRAVENOUS at 21:38

## 2020-01-22 RX ADMIN — DOXYCYCLINE 100 MG: 100 INJECTION, POWDER, LYOPHILIZED, FOR SOLUTION INTRAVENOUS at 21:37

## 2020-01-22 RX ADMIN — ONDANSETRON HYDROCHLORIDE 4 MG: 2 SOLUTION INTRAMUSCULAR; INTRAVENOUS at 11:41

## 2020-01-22 RX ADMIN — MORPHINE SULFATE 4 MG: 4 INJECTION, SOLUTION INTRAMUSCULAR; INTRAVENOUS at 01:13

## 2020-01-22 RX ADMIN — ENOXAPARIN SODIUM 40 MG: 40 INJECTION SUBCUTANEOUS at 06:20

## 2020-01-22 RX ADMIN — BUMETANIDE 2 MG: 2 TABLET ORAL at 06:32

## 2020-01-22 RX ADMIN — CEFEPIME HYDROCHLORIDE 1 G: 1 INJECTION, POWDER, FOR SOLUTION INTRAMUSCULAR; INTRAVENOUS at 22:56

## 2020-01-22 RX ADMIN — SODIUM CHLORIDE, PRESERVATIVE FREE 10 ML: 5 INJECTION INTRAVENOUS at 11:42

## 2020-01-22 RX ADMIN — CEFEPIME HYDROCHLORIDE 1 G: 1 INJECTION, POWDER, FOR SOLUTION INTRAMUSCULAR; INTRAVENOUS at 10:51

## 2020-01-22 RX ADMIN — LINEZOLID 600 MG: 600 INJECTION, SOLUTION INTRAVENOUS at 21:37

## 2020-01-22 RX ADMIN — LACTULOSE 20 G: 20 SOLUTION ORAL at 06:32

## 2020-01-22 RX ADMIN — PANTOPRAZOLE SODIUM 40 MG: 40 TABLET, DELAYED RELEASE ORAL at 10:43

## 2020-01-22 RX ADMIN — ACETAMINOPHEN 1000 MG: 500 TABLET, FILM COATED ORAL at 02:35

## 2020-01-22 RX ADMIN — SODIUM CHLORIDE, PRESERVATIVE FREE 10 ML: 5 INJECTION INTRAVENOUS at 10:45

## 2020-01-22 RX ADMIN — INSULIN DETEMIR 20 UNITS: 100 INJECTION, SOLUTION SUBCUTANEOUS at 21:38

## 2020-01-22 RX ADMIN — INSULIN LISPRO 8 UNITS: 100 INJECTION, SOLUTION INTRAVENOUS; SUBCUTANEOUS at 12:58

## 2020-01-22 RX ADMIN — IBUPROFEN 400 MG: 400 TABLET, FILM COATED ORAL at 04:08

## 2020-01-22 RX ADMIN — DOXYCYCLINE 100 MG: 100 INJECTION, POWDER, LYOPHILIZED, FOR SOLUTION INTRAVENOUS at 14:41

## 2020-01-22 RX ADMIN — LATANOPROST 1 DROP: 50 SOLUTION OPHTHALMIC at 21:38

## 2020-01-22 RX ADMIN — CEFEPIME HYDROCHLORIDE 2 G: 2 INJECTION, POWDER, FOR SOLUTION INTRAVENOUS at 02:39

## 2020-01-22 RX ADMIN — OXYCODONE HYDROCHLORIDE AND ACETAMINOPHEN 1 TABLET: 10; 325 TABLET ORAL at 21:57

## 2020-01-22 RX ADMIN — INSULIN LISPRO 4 UNITS: 100 INJECTION, SOLUTION INTRAVENOUS; SUBCUTANEOUS at 10:44

## 2020-01-22 RX ADMIN — BUMETANIDE 2 MG: 2 TABLET ORAL at 18:20

## 2020-01-22 RX ADMIN — Medication 250 MG: at 10:42

## 2020-01-22 RX ADMIN — INSULIN LISPRO 20 UNITS: 100 INJECTION, SOLUTION INTRAVENOUS; SUBCUTANEOUS at 21:39

## 2020-01-22 RX ADMIN — ROSUVASTATIN CALCIUM 40 MG: 20 TABLET, FILM COATED ORAL at 10:42

## 2020-01-22 RX ADMIN — OXYCODONE HYDROCHLORIDE AND ACETAMINOPHEN 1 TABLET: 10; 325 TABLET ORAL at 10:44

## 2020-01-22 RX ADMIN — ASPIRIN 81 MG: 81 TABLET ORAL at 10:43

## 2020-01-22 RX ADMIN — ACETAMINOPHEN 650 MG: 325 TABLET, FILM COATED ORAL at 20:04

## 2020-01-22 RX ADMIN — LOSARTAN POTASSIUM 100 MG: 50 TABLET, FILM COATED ORAL at 10:43

## 2020-01-22 RX ADMIN — SODIUM CHLORIDE, PRESERVATIVE FREE 10 ML: 5 INJECTION INTRAVENOUS at 21:39

## 2020-01-22 RX ADMIN — MUPIROCIN: 20 OINTMENT TOPICAL at 18:52

## 2020-01-22 RX ADMIN — GABAPENTIN 100 MG: 100 CAPSULE ORAL at 10:44

## 2020-01-22 RX ADMIN — INSULIN LISPRO 20 UNITS: 100 INJECTION, SOLUTION INTRAVENOUS; SUBCUTANEOUS at 18:21

## 2020-01-22 RX ADMIN — DULOXETINE HYDROCHLORIDE 60 MG: 30 CAPSULE, DELAYED RELEASE ORAL at 10:43

## 2020-01-22 RX ADMIN — GABAPENTIN 100 MG: 100 CAPSULE ORAL at 21:39

## 2020-01-22 RX ADMIN — LINEZOLID 600 MG: 600 INJECTION, SOLUTION INTRAVENOUS at 14:41

## 2020-01-22 RX ADMIN — SODIUM CHLORIDE, PRESERVATIVE FREE 10 ML: 5 INJECTION INTRAVENOUS at 14:41

## 2020-01-22 RX ADMIN — ONDANSETRON HYDROCHLORIDE 4 MG: 2 SOLUTION INTRAMUSCULAR; INTRAVENOUS at 01:13

## 2020-01-22 RX ADMIN — ENOXAPARIN SODIUM 40 MG: 40 INJECTION SUBCUTANEOUS at 18:20

## 2020-01-22 RX ADMIN — Medication 250 MG: at 21:39

## 2020-01-23 PROBLEM — N17.9 ACUTE KIDNEY INJURY SUPERIMPOSED ON CHRONIC KIDNEY DISEASE: Status: ACTIVE | Noted: 2020-01-23

## 2020-01-23 PROBLEM — N18.9 ACUTE KIDNEY INJURY SUPERIMPOSED ON CHRONIC KIDNEY DISEASE: Status: ACTIVE | Noted: 2020-01-23

## 2020-01-23 LAB
25(OH)D3 SERPL-MCNC: 18.3 NG/ML (ref 30–100)
ANION GAP SERPL CALCULATED.3IONS-SCNC: 13 MMOL/L (ref 5–15)
BACTERIA SPEC AEROBE CULT: ABNORMAL
BACTERIA SPEC AEROBE CULT: NO GROWTH
BUN BLD-MCNC: 49 MG/DL (ref 8–23)
BUN/CREAT SERPL: 20.6 (ref 7–25)
CALCIUM SPEC-SCNC: 8.6 MG/DL (ref 8.6–10.5)
CHLORIDE SERPL-SCNC: 92 MMOL/L (ref 98–107)
CO2 SERPL-SCNC: 25 MMOL/L (ref 22–29)
CREAT BLD-MCNC: 2.38 MG/DL (ref 0.76–1.27)
GFR SERPL CREATININE-BSD FRML MDRD: 28 ML/MIN/1.73
GLUCOSE BLD-MCNC: 358 MG/DL (ref 65–99)
GLUCOSE BLDC GLUCOMTR-MCNC: 374 MG/DL (ref 70–130)
GLUCOSE BLDC GLUCOMTR-MCNC: 376 MG/DL (ref 70–130)
GLUCOSE BLDC GLUCOMTR-MCNC: 382 MG/DL (ref 70–130)
MAGNESIUM SERPL-MCNC: 2.1 MG/DL (ref 1.6–2.4)
PHOSPHATE SERPL-MCNC: 4.1 MG/DL (ref 2.5–4.5)
POTASSIUM BLD-SCNC: 4.4 MMOL/L (ref 3.5–5.2)
PTH-INTACT SERPL-MCNC: 123.3 PG/ML (ref 15–65)
SODIUM BLD-SCNC: 130 MMOL/L (ref 136–145)
URATE SERPL-MCNC: 8.8 MG/DL (ref 3.4–7)

## 2020-01-23 PROCEDURE — 99232 SBSQ HOSP IP/OBS MODERATE 35: CPT | Performed by: PODIATRIST

## 2020-01-23 PROCEDURE — 63710000001 INSULIN LISPRO (HUMAN) PER 5 UNITS: Performed by: INTERNAL MEDICINE

## 2020-01-23 PROCEDURE — 25010000002 CEFEPIME PER 500 MG: Performed by: INTERNAL MEDICINE

## 2020-01-23 PROCEDURE — 83735 ASSAY OF MAGNESIUM: CPT | Performed by: INTERNAL MEDICINE

## 2020-01-23 PROCEDURE — 25010000002 CEFTRIAXONE PER 250 MG: Performed by: INTERNAL MEDICINE

## 2020-01-23 PROCEDURE — 82962 GLUCOSE BLOOD TEST: CPT

## 2020-01-23 PROCEDURE — 83970 ASSAY OF PARATHORMONE: CPT | Performed by: INTERNAL MEDICINE

## 2020-01-23 PROCEDURE — 63710000001 INSULIN DETEMIR PER 5 UNITS: Performed by: INTERNAL MEDICINE

## 2020-01-23 PROCEDURE — 80048 BASIC METABOLIC PNL TOTAL CA: CPT | Performed by: NURSE PRACTITIONER

## 2020-01-23 PROCEDURE — 82306 VITAMIN D 25 HYDROXY: CPT | Performed by: INTERNAL MEDICINE

## 2020-01-23 PROCEDURE — 84550 ASSAY OF BLOOD/URIC ACID: CPT | Performed by: INTERNAL MEDICINE

## 2020-01-23 PROCEDURE — 25010000002 ONDANSETRON PER 1 MG: Performed by: INTERNAL MEDICINE

## 2020-01-23 PROCEDURE — 84100 ASSAY OF PHOSPHORUS: CPT | Performed by: INTERNAL MEDICINE

## 2020-01-23 PROCEDURE — 25010000002 ENOXAPARIN PER 10 MG: Performed by: INTERNAL MEDICINE

## 2020-01-23 RX ORDER — DOXYCYCLINE 100 MG/1
CAPSULE ORAL
Qty: 84 CAPSULE | Refills: 0 | OUTPATIENT
Start: 2020-01-23

## 2020-01-23 RX ORDER — BUMETANIDE 0.25 MG/ML
2 INJECTION INTRAMUSCULAR; INTRAVENOUS
Status: DISCONTINUED | OUTPATIENT
Start: 2020-01-23 | End: 2020-01-25

## 2020-01-23 RX ADMIN — ENOXAPARIN SODIUM 40 MG: 40 INJECTION SUBCUTANEOUS at 17:49

## 2020-01-23 RX ADMIN — INSULIN LISPRO 20 UNITS: 100 INJECTION, SOLUTION INTRAVENOUS; SUBCUTANEOUS at 17:50

## 2020-01-23 RX ADMIN — BUMETANIDE 2 MG: 0.25 INJECTION INTRAMUSCULAR; INTRAVENOUS at 17:50

## 2020-01-23 RX ADMIN — CEFEPIME HYDROCHLORIDE 1 G: 1 INJECTION, POWDER, FOR SOLUTION INTRAMUSCULAR; INTRAVENOUS at 08:05

## 2020-01-23 RX ADMIN — Medication 250 MG: at 20:43

## 2020-01-23 RX ADMIN — GABAPENTIN 100 MG: 100 CAPSULE ORAL at 09:35

## 2020-01-23 RX ADMIN — ASPIRIN 81 MG: 81 TABLET ORAL at 09:35

## 2020-01-23 RX ADMIN — LACTULOSE 20 G: 20 SOLUTION ORAL at 09:34

## 2020-01-23 RX ADMIN — LOSARTAN POTASSIUM 100 MG: 50 TABLET, FILM COATED ORAL at 09:35

## 2020-01-23 RX ADMIN — LATANOPROST 1 DROP: 50 SOLUTION OPHTHALMIC at 20:44

## 2020-01-23 RX ADMIN — OXYCODONE HYDROCHLORIDE AND ACETAMINOPHEN 1 TABLET: 5; 325 TABLET ORAL at 17:51

## 2020-01-23 RX ADMIN — INSULIN LISPRO 20 UNITS: 100 INJECTION, SOLUTION INTRAVENOUS; SUBCUTANEOUS at 09:33

## 2020-01-23 RX ADMIN — CEFTRIAXONE SODIUM 1 G: 1 INJECTION, POWDER, FOR SOLUTION INTRAMUSCULAR; INTRAVENOUS at 23:05

## 2020-01-23 RX ADMIN — OXYCODONE HYDROCHLORIDE AND ACETAMINOPHEN 1 TABLET: 10; 325 TABLET ORAL at 20:44

## 2020-01-23 RX ADMIN — BUMETANIDE 2 MG: 2 TABLET ORAL at 09:36

## 2020-01-23 RX ADMIN — SODIUM CHLORIDE, PRESERVATIVE FREE 10 ML: 5 INJECTION INTRAVENOUS at 11:21

## 2020-01-23 RX ADMIN — OXYCODONE HYDROCHLORIDE AND ACETAMINOPHEN 1 TABLET: 5; 325 TABLET ORAL at 05:55

## 2020-01-23 RX ADMIN — SODIUM CHLORIDE, PRESERVATIVE FREE 10 ML: 5 INJECTION INTRAVENOUS at 20:43

## 2020-01-23 RX ADMIN — ENOXAPARIN SODIUM 40 MG: 40 INJECTION SUBCUTANEOUS at 05:55

## 2020-01-23 RX ADMIN — ONDANSETRON HYDROCHLORIDE 4 MG: 2 SOLUTION INTRAMUSCULAR; INTRAVENOUS at 13:20

## 2020-01-23 RX ADMIN — DULOXETINE HYDROCHLORIDE 60 MG: 30 CAPSULE, DELAYED RELEASE ORAL at 09:36

## 2020-01-23 RX ADMIN — GABAPENTIN 100 MG: 100 CAPSULE ORAL at 20:44

## 2020-01-23 RX ADMIN — PANTOPRAZOLE SODIUM 40 MG: 40 TABLET, DELAYED RELEASE ORAL at 09:34

## 2020-01-23 RX ADMIN — INSULIN LISPRO 20 UNITS: 100 INJECTION, SOLUTION INTRAVENOUS; SUBCUTANEOUS at 12:48

## 2020-01-23 RX ADMIN — SODIUM CHLORIDE, PRESERVATIVE FREE 10 ML: 5 INJECTION INTRAVENOUS at 08:05

## 2020-01-23 RX ADMIN — ONDANSETRON HYDROCHLORIDE 4 MG: 2 SOLUTION INTRAMUSCULAR; INTRAVENOUS at 05:56

## 2020-01-23 RX ADMIN — Medication 250 MG: at 09:35

## 2020-01-23 RX ADMIN — SODIUM CHLORIDE, PRESERVATIVE FREE 10 ML: 5 INJECTION INTRAVENOUS at 13:22

## 2020-01-23 RX ADMIN — ONDANSETRON HYDROCHLORIDE 4 MG: 2 SOLUTION INTRAMUSCULAR; INTRAVENOUS at 20:44

## 2020-01-23 RX ADMIN — MUPIROCIN: 20 OINTMENT TOPICAL at 09:34

## 2020-01-23 RX ADMIN — ROSUVASTATIN CALCIUM 40 MG: 20 TABLET, FILM COATED ORAL at 09:35

## 2020-01-23 RX ADMIN — CEFTRIAXONE SODIUM 1 G: 1 INJECTION, POWDER, FOR SOLUTION INTRAMUSCULAR; INTRAVENOUS at 11:21

## 2020-01-23 RX ADMIN — OXYCODONE HYDROCHLORIDE AND ACETAMINOPHEN 1 TABLET: 10; 325 TABLET ORAL at 09:35

## 2020-01-23 RX ADMIN — INSULIN DETEMIR 20 UNITS: 100 INJECTION, SOLUTION SUBCUTANEOUS at 20:42

## 2020-01-23 RX ADMIN — INSULIN LISPRO 20 UNITS: 100 INJECTION, SOLUTION INTRAVENOUS; SUBCUTANEOUS at 20:43

## 2020-01-24 LAB
ANION GAP SERPL CALCULATED.3IONS-SCNC: 14 MMOL/L (ref 5–15)
BUN BLD-MCNC: 59 MG/DL (ref 8–23)
BUN/CREAT SERPL: 23.9 (ref 7–25)
CALCIUM SPEC-SCNC: 8.8 MG/DL (ref 8.6–10.5)
CHLORIDE SERPL-SCNC: 97 MMOL/L (ref 98–107)
CO2 SERPL-SCNC: 25 MMOL/L (ref 22–29)
CREAT BLD-MCNC: 2.47 MG/DL (ref 0.76–1.27)
GFR SERPL CREATININE-BSD FRML MDRD: 27 ML/MIN/1.73
GLUCOSE BLD-MCNC: 186 MG/DL (ref 65–99)
GLUCOSE BLDC GLUCOMTR-MCNC: 181 MG/DL (ref 70–130)
GLUCOSE BLDC GLUCOMTR-MCNC: 277 MG/DL (ref 70–130)
GLUCOSE BLDC GLUCOMTR-MCNC: 312 MG/DL (ref 70–130)
GLUCOSE BLDC GLUCOMTR-MCNC: 387 MG/DL (ref 70–130)
GLUCOSE BLDC GLUCOMTR-MCNC: 412 MG/DL (ref 70–130)
GLUCOSE BLDC GLUCOMTR-MCNC: 442 MG/DL (ref 70–130)
GLUCOSE BLDC GLUCOMTR-MCNC: 462 MG/DL (ref 70–130)
GLUCOSE BLDC GLUCOMTR-MCNC: 498 MG/DL (ref 70–130)
POTASSIUM BLD-SCNC: 4.4 MMOL/L (ref 3.5–5.2)
SODIUM BLD-SCNC: 136 MMOL/L (ref 136–145)

## 2020-01-24 PROCEDURE — 82962 GLUCOSE BLOOD TEST: CPT

## 2020-01-24 PROCEDURE — 63710000001 INSULIN LISPRO (HUMAN) PER 5 UNITS: Performed by: INTERNAL MEDICINE

## 2020-01-24 PROCEDURE — 25010000002 ONDANSETRON PER 1 MG: Performed by: INTERNAL MEDICINE

## 2020-01-24 PROCEDURE — 25010000002 CEFTRIAXONE PER 250 MG: Performed by: INTERNAL MEDICINE

## 2020-01-24 PROCEDURE — 63710000001 INSULIN DETEMIR PER 5 UNITS: Performed by: FAMILY MEDICINE

## 2020-01-24 PROCEDURE — 80053 COMPREHEN METABOLIC PANEL: CPT | Performed by: NURSE PRACTITIONER

## 2020-01-24 PROCEDURE — 85025 COMPLETE CBC W/AUTO DIFF WBC: CPT | Performed by: NURSE PRACTITIONER

## 2020-01-24 PROCEDURE — 25010000002 ENOXAPARIN PER 10 MG: Performed by: INTERNAL MEDICINE

## 2020-01-24 PROCEDURE — 99232 SBSQ HOSP IP/OBS MODERATE 35: CPT | Performed by: UROLOGY

## 2020-01-24 PROCEDURE — 63710000001 INSULIN LISPRO (HUMAN) PER 5 UNITS: Performed by: FAMILY MEDICINE

## 2020-01-24 PROCEDURE — 80048 BASIC METABOLIC PNL TOTAL CA: CPT | Performed by: NURSE PRACTITIONER

## 2020-01-24 RX ORDER — ALFUZOSIN HYDROCHLORIDE 10 MG/1
10 TABLET, EXTENDED RELEASE ORAL DAILY
Qty: 90 TABLET | Refills: 3 | Status: SHIPPED | OUTPATIENT
Start: 2020-01-24 | End: 2020-01-25 | Stop reason: HOSPADM

## 2020-01-24 RX ORDER — LEVOFLOXACIN 500 MG/1
500 TABLET, FILM COATED ORAL EVERY 24 HOURS
Status: DISCONTINUED | OUTPATIENT
Start: 2020-01-24 | End: 2020-01-25 | Stop reason: HOSPADM

## 2020-01-24 RX ADMIN — INSULIN DETEMIR 30 UNITS: 100 INJECTION, SOLUTION SUBCUTANEOUS at 21:42

## 2020-01-24 RX ADMIN — OXYCODONE HYDROCHLORIDE AND ACETAMINOPHEN 1 TABLET: 5; 325 TABLET ORAL at 04:37

## 2020-01-24 RX ADMIN — ONDANSETRON HYDROCHLORIDE 4 MG: 2 SOLUTION INTRAMUSCULAR; INTRAVENOUS at 04:37

## 2020-01-24 RX ADMIN — Medication 250 MG: at 21:34

## 2020-01-24 RX ADMIN — INSULIN LISPRO 12 UNITS: 100 INJECTION, SOLUTION INTRAVENOUS; SUBCUTANEOUS at 23:57

## 2020-01-24 RX ADMIN — TRAZODONE HYDROCHLORIDE 25 MG: 50 TABLET ORAL at 21:35

## 2020-01-24 RX ADMIN — ONDANSETRON HYDROCHLORIDE 4 MG: 2 SOLUTION INTRAMUSCULAR; INTRAVENOUS at 14:56

## 2020-01-24 RX ADMIN — INSULIN LISPRO 10 UNITS: 100 INJECTION, SOLUTION INTRAVENOUS; SUBCUTANEOUS at 18:26

## 2020-01-24 RX ADMIN — SODIUM CHLORIDE, PRESERVATIVE FREE 10 ML: 5 INJECTION INTRAVENOUS at 09:25

## 2020-01-24 RX ADMIN — OXYCODONE HYDROCHLORIDE AND ACETAMINOPHEN 1 TABLET: 5; 325 TABLET ORAL at 14:56

## 2020-01-24 RX ADMIN — GABAPENTIN 100 MG: 100 CAPSULE ORAL at 21:34

## 2020-01-24 RX ADMIN — PANTOPRAZOLE SODIUM 40 MG: 40 TABLET, DELAYED RELEASE ORAL at 09:25

## 2020-01-24 RX ADMIN — OXYCODONE HYDROCHLORIDE AND ACETAMINOPHEN 1 TABLET: 10; 325 TABLET ORAL at 09:25

## 2020-01-24 RX ADMIN — ENOXAPARIN SODIUM 40 MG: 40 INJECTION SUBCUTANEOUS at 17:59

## 2020-01-24 RX ADMIN — INSULIN LISPRO 20 UNITS: 100 INJECTION, SOLUTION INTRAVENOUS; SUBCUTANEOUS at 21:34

## 2020-01-24 RX ADMIN — LEVOFLOXACIN 500 MG: 500 TABLET, FILM COATED ORAL at 14:44

## 2020-01-24 RX ADMIN — OXYCODONE HYDROCHLORIDE AND ACETAMINOPHEN 1 TABLET: 10; 325 TABLET ORAL at 21:34

## 2020-01-24 RX ADMIN — CEFTRIAXONE SODIUM 1 G: 1 INJECTION, POWDER, FOR SOLUTION INTRAMUSCULAR; INTRAVENOUS at 11:20

## 2020-01-24 RX ADMIN — ENOXAPARIN SODIUM 40 MG: 40 INJECTION SUBCUTANEOUS at 06:12

## 2020-01-24 RX ADMIN — GABAPENTIN 100 MG: 100 CAPSULE ORAL at 09:25

## 2020-01-24 RX ADMIN — ASPIRIN 81 MG: 81 TABLET ORAL at 09:25

## 2020-01-24 RX ADMIN — BUMETANIDE 2 MG: 0.25 INJECTION INTRAMUSCULAR; INTRAVENOUS at 17:59

## 2020-01-24 RX ADMIN — DULOXETINE HYDROCHLORIDE 60 MG: 30 CAPSULE, DELAYED RELEASE ORAL at 09:24

## 2020-01-24 RX ADMIN — ROSUVASTATIN CALCIUM 40 MG: 20 TABLET, FILM COATED ORAL at 09:24

## 2020-01-24 RX ADMIN — INSULIN LISPRO 4 UNITS: 100 INJECTION, SOLUTION INTRAVENOUS; SUBCUTANEOUS at 09:24

## 2020-01-24 RX ADMIN — OXYCODONE HYDROCHLORIDE AND ACETAMINOPHEN 1 TABLET: 5; 325 TABLET ORAL at 23:03

## 2020-01-24 RX ADMIN — LOSARTAN POTASSIUM 100 MG: 50 TABLET, FILM COATED ORAL at 09:24

## 2020-01-24 RX ADMIN — BUMETANIDE 2 MG: 0.25 INJECTION INTRAMUSCULAR; INTRAVENOUS at 09:24

## 2020-01-24 RX ADMIN — ONDANSETRON 4 MG: 4 TABLET, ORALLY DISINTEGRATING ORAL at 21:35

## 2020-01-24 RX ADMIN — INSULIN LISPRO 24 UNITS: 100 INJECTION, SOLUTION INTRAVENOUS; SUBCUTANEOUS at 17:59

## 2020-01-24 RX ADMIN — LACTULOSE 20 G: 20 SOLUTION ORAL at 21:41

## 2020-01-24 RX ADMIN — MUPIROCIN: 20 OINTMENT TOPICAL at 09:23

## 2020-01-24 RX ADMIN — SODIUM CHLORIDE, PRESERVATIVE FREE 10 ML: 5 INJECTION INTRAVENOUS at 21:36

## 2020-01-24 RX ADMIN — LATANOPROST 1 DROP: 50 SOLUTION OPHTHALMIC at 21:35

## 2020-01-24 RX ADMIN — INSULIN LISPRO 12 UNITS: 100 INJECTION, SOLUTION INTRAVENOUS; SUBCUTANEOUS at 12:54

## 2020-01-24 RX ADMIN — Medication 250 MG: at 09:24

## 2020-01-25 VITALS
OXYGEN SATURATION: 96 % | DIASTOLIC BLOOD PRESSURE: 55 MMHG | TEMPERATURE: 98 F | WEIGHT: 315 LBS | HEIGHT: 72 IN | BODY MASS INDEX: 42.66 KG/M2 | SYSTOLIC BLOOD PRESSURE: 131 MMHG | HEART RATE: 74 BPM | RESPIRATION RATE: 18 BRPM

## 2020-01-25 PROBLEM — N41.9 PROSTATITIS: Status: ACTIVE | Noted: 2020-01-25

## 2020-01-25 LAB
ALBUMIN SERPL-MCNC: 3.6 G/DL (ref 3.5–5.2)
ALBUMIN SERPL-MCNC: 3.8 G/DL (ref 3.5–5.2)
ALBUMIN/GLOB SERPL: 1.2 G/DL
ALBUMIN/GLOB SERPL: 1.2 G/DL
ALP SERPL-CCNC: 73 U/L (ref 39–117)
ALP SERPL-CCNC: 75 U/L (ref 39–117)
ALT SERPL W P-5'-P-CCNC: 15 U/L (ref 1–41)
ALT SERPL W P-5'-P-CCNC: 15 U/L (ref 1–41)
ANION GAP SERPL CALCULATED.3IONS-SCNC: 11 MMOL/L (ref 5–15)
ANION GAP SERPL CALCULATED.3IONS-SCNC: 14 MMOL/L (ref 5–15)
AST SERPL-CCNC: 15 U/L (ref 1–40)
AST SERPL-CCNC: 15 U/L (ref 1–40)
BASOPHILS # BLD AUTO: 0.04 10*3/MM3 (ref 0–0.2)
BASOPHILS # BLD AUTO: 0.05 10*3/MM3 (ref 0–0.2)
BASOPHILS NFR BLD AUTO: 0.6 % (ref 0–1.5)
BASOPHILS NFR BLD AUTO: 0.7 % (ref 0–1.5)
BILIRUB SERPL-MCNC: 0.3 MG/DL (ref 0.2–1.2)
BILIRUB SERPL-MCNC: 0.3 MG/DL (ref 0.2–1.2)
BUN BLD-MCNC: 60 MG/DL (ref 8–23)
BUN BLD-MCNC: 60 MG/DL (ref 8–23)
BUN/CREAT SERPL: 26.9 (ref 7–25)
BUN/CREAT SERPL: 27.4 (ref 7–25)
CALCIUM SPEC-SCNC: 8.7 MG/DL (ref 8.6–10.5)
CALCIUM SPEC-SCNC: 8.9 MG/DL (ref 8.6–10.5)
CHLORIDE SERPL-SCNC: 95 MMOL/L (ref 98–107)
CHLORIDE SERPL-SCNC: 97 MMOL/L (ref 98–107)
CO2 SERPL-SCNC: 24 MMOL/L (ref 22–29)
CO2 SERPL-SCNC: 26 MMOL/L (ref 22–29)
CREAT BLD-MCNC: 2.19 MG/DL (ref 0.76–1.27)
CREAT BLD-MCNC: 2.23 MG/DL (ref 0.76–1.27)
DEPRECATED RDW RBC AUTO: 44 FL (ref 37–54)
DEPRECATED RDW RBC AUTO: 44.6 FL (ref 37–54)
EOSINOPHIL # BLD AUTO: 0.52 10*3/MM3 (ref 0–0.4)
EOSINOPHIL # BLD AUTO: 0.58 10*3/MM3 (ref 0–0.4)
EOSINOPHIL NFR BLD AUTO: 7.5 % (ref 0.3–6.2)
EOSINOPHIL NFR BLD AUTO: 8.3 % (ref 0.3–6.2)
ERYTHROCYTE [DISTWIDTH] IN BLOOD BY AUTOMATED COUNT: 14.1 % (ref 12.3–15.4)
ERYTHROCYTE [DISTWIDTH] IN BLOOD BY AUTOMATED COUNT: 14.3 % (ref 12.3–15.4)
GFR SERPL CREATININE-BSD FRML MDRD: 30 ML/MIN/1.73
GFR SERPL CREATININE-BSD FRML MDRD: 31 ML/MIN/1.73
GLOBULIN UR ELPH-MCNC: 3.1 GM/DL
GLOBULIN UR ELPH-MCNC: 3.1 GM/DL
GLUCOSE BLD-MCNC: 122 MG/DL (ref 65–99)
GLUCOSE BLD-MCNC: 259 MG/DL (ref 65–99)
GLUCOSE BLDC GLUCOMTR-MCNC: 182 MG/DL (ref 70–130)
GLUCOSE BLDC GLUCOMTR-MCNC: 294 MG/DL (ref 70–130)
GLUCOSE BLDC GLUCOMTR-MCNC: 365 MG/DL (ref 70–130)
HCT VFR BLD AUTO: 31.9 % (ref 37.5–51)
HCT VFR BLD AUTO: 32.9 % (ref 37.5–51)
HGB BLD-MCNC: 10.9 G/DL (ref 13–17.7)
HGB BLD-MCNC: 11.2 G/DL (ref 13–17.7)
IMM GRANULOCYTES # BLD AUTO: 0.03 10*3/MM3 (ref 0–0.05)
IMM GRANULOCYTES # BLD AUTO: 0.04 10*3/MM3 (ref 0–0.05)
IMM GRANULOCYTES NFR BLD AUTO: 0.4 % (ref 0–0.5)
IMM GRANULOCYTES NFR BLD AUTO: 0.6 % (ref 0–0.5)
LYMPHOCYTES # BLD AUTO: 1 10*3/MM3 (ref 0.7–3.1)
LYMPHOCYTES # BLD AUTO: 1.25 10*3/MM3 (ref 0.7–3.1)
LYMPHOCYTES NFR BLD AUTO: 14.4 % (ref 19.6–45.3)
LYMPHOCYTES NFR BLD AUTO: 17.9 % (ref 19.6–45.3)
MCH RBC QN AUTO: 28.9 PG (ref 26.6–33)
MCH RBC QN AUTO: 29.3 PG (ref 26.6–33)
MCHC RBC AUTO-ENTMCNC: 34 G/DL (ref 31.5–35.7)
MCHC RBC AUTO-ENTMCNC: 34.2 G/DL (ref 31.5–35.7)
MCV RBC AUTO: 85 FL (ref 79–97)
MCV RBC AUTO: 85.8 FL (ref 79–97)
MONOCYTES # BLD AUTO: 0.92 10*3/MM3 (ref 0.1–0.9)
MONOCYTES # BLD AUTO: 1.12 10*3/MM3 (ref 0.1–0.9)
MONOCYTES NFR BLD AUTO: 13.2 % (ref 5–12)
MONOCYTES NFR BLD AUTO: 16 % (ref 5–12)
NEUTROPHILS # BLD AUTO: 3.95 10*3/MM3 (ref 1.7–7)
NEUTROPHILS # BLD AUTO: 4.43 10*3/MM3 (ref 1.7–7)
NEUTROPHILS NFR BLD AUTO: 56.6 % (ref 42.7–76)
NEUTROPHILS NFR BLD AUTO: 63.8 % (ref 42.7–76)
NRBC BLD AUTO-RTO: 0 /100 WBC (ref 0–0.2)
NRBC BLD AUTO-RTO: 0 /100 WBC (ref 0–0.2)
PLATELET # BLD AUTO: 249 10*3/MM3 (ref 140–450)
PLATELET # BLD AUTO: 250 10*3/MM3 (ref 140–450)
PMV BLD AUTO: 11.1 FL (ref 6–12)
PMV BLD AUTO: 11.2 FL (ref 6–12)
POTASSIUM BLD-SCNC: 4.1 MMOL/L (ref 3.5–5.2)
POTASSIUM BLD-SCNC: 4.6 MMOL/L (ref 3.5–5.2)
PROT SERPL-MCNC: 6.7 G/DL (ref 6–8.5)
PROT SERPL-MCNC: 6.9 G/DL (ref 6–8.5)
RBC # BLD AUTO: 3.72 10*6/MM3 (ref 4.14–5.8)
RBC # BLD AUTO: 3.87 10*6/MM3 (ref 4.14–5.8)
SODIUM BLD-SCNC: 132 MMOL/L (ref 136–145)
SODIUM BLD-SCNC: 135 MMOL/L (ref 136–145)
WBC NRBC COR # BLD: 6.95 10*3/MM3 (ref 3.4–10.8)
WBC NRBC COR # BLD: 6.98 10*3/MM3 (ref 3.4–10.8)

## 2020-01-25 PROCEDURE — 85025 COMPLETE CBC W/AUTO DIFF WBC: CPT | Performed by: NURSE PRACTITIONER

## 2020-01-25 PROCEDURE — 63710000001 INSULIN LISPRO (HUMAN) PER 5 UNITS: Performed by: INTERNAL MEDICINE

## 2020-01-25 PROCEDURE — 25010000002 ENOXAPARIN PER 10 MG: Performed by: INTERNAL MEDICINE

## 2020-01-25 PROCEDURE — 82962 GLUCOSE BLOOD TEST: CPT

## 2020-01-25 PROCEDURE — 80053 COMPREHEN METABOLIC PANEL: CPT | Performed by: NURSE PRACTITIONER

## 2020-01-25 PROCEDURE — 63710000001 INSULIN LISPRO (HUMAN) PER 5 UNITS: Performed by: FAMILY MEDICINE

## 2020-01-25 PROCEDURE — 99231 SBSQ HOSP IP/OBS SF/LOW 25: CPT | Performed by: UROLOGY

## 2020-01-25 PROCEDURE — 25010000002 ONDANSETRON PER 1 MG: Performed by: INTERNAL MEDICINE

## 2020-01-25 RX ORDER — TAMSULOSIN HYDROCHLORIDE 0.4 MG/1
1 CAPSULE ORAL DAILY
Qty: 30 CAPSULE | Refills: 2 | Status: SHIPPED | OUTPATIENT
Start: 2020-01-25 | End: 2020-03-09 | Stop reason: SDUPTHER

## 2020-01-25 RX ORDER — LEVOFLOXACIN 500 MG/1
500 TABLET, FILM COATED ORAL EVERY 24 HOURS
Qty: 26 TABLET | Refills: 0 | Status: SHIPPED | OUTPATIENT
Start: 2020-01-26 | End: 2020-02-21

## 2020-01-25 RX ORDER — BUMETANIDE 2 MG/1
2 TABLET ORAL
Status: DISCONTINUED | OUTPATIENT
Start: 2020-01-26 | End: 2020-01-25 | Stop reason: HOSPADM

## 2020-01-25 RX ADMIN — SODIUM CHLORIDE, PRESERVATIVE FREE 10 ML: 5 INJECTION INTRAVENOUS at 10:00

## 2020-01-25 RX ADMIN — OXYCODONE HYDROCHLORIDE AND ACETAMINOPHEN 1 TABLET: 5; 325 TABLET ORAL at 14:37

## 2020-01-25 RX ADMIN — PANTOPRAZOLE SODIUM 40 MG: 40 TABLET, DELAYED RELEASE ORAL at 09:58

## 2020-01-25 RX ADMIN — DULOXETINE HYDROCHLORIDE 60 MG: 30 CAPSULE, DELAYED RELEASE ORAL at 09:58

## 2020-01-25 RX ADMIN — ENOXAPARIN SODIUM 40 MG: 40 INJECTION SUBCUTANEOUS at 05:44

## 2020-01-25 RX ADMIN — BUMETANIDE 2 MG: 0.25 INJECTION INTRAMUSCULAR; INTRAVENOUS at 17:39

## 2020-01-25 RX ADMIN — INSULIN LISPRO 20 UNITS: 100 INJECTION, SOLUTION INTRAVENOUS; SUBCUTANEOUS at 17:38

## 2020-01-25 RX ADMIN — Medication 250 MG: at 09:58

## 2020-01-25 RX ADMIN — GABAPENTIN 100 MG: 100 CAPSULE ORAL at 09:58

## 2020-01-25 RX ADMIN — BUMETANIDE 2 MG: 0.25 INJECTION INTRAMUSCULAR; INTRAVENOUS at 09:58

## 2020-01-25 RX ADMIN — LACTULOSE 20 G: 20 SOLUTION ORAL at 05:44

## 2020-01-25 RX ADMIN — LOSARTAN POTASSIUM 100 MG: 50 TABLET, FILM COATED ORAL at 09:58

## 2020-01-25 RX ADMIN — OXYCODONE HYDROCHLORIDE AND ACETAMINOPHEN 1 TABLET: 5; 325 TABLET ORAL at 05:44

## 2020-01-25 RX ADMIN — OXYCODONE HYDROCHLORIDE AND ACETAMINOPHEN 1 TABLET: 10; 325 TABLET ORAL at 10:03

## 2020-01-25 RX ADMIN — LACTULOSE 20 G: 20 SOLUTION ORAL at 16:22

## 2020-01-25 RX ADMIN — ENOXAPARIN SODIUM 40 MG: 40 INJECTION SUBCUTANEOUS at 17:39

## 2020-01-25 RX ADMIN — ASPIRIN 81 MG: 81 TABLET ORAL at 09:58

## 2020-01-25 RX ADMIN — ONDANSETRON HYDROCHLORIDE 4 MG: 2 SOLUTION INTRAMUSCULAR; INTRAVENOUS at 05:44

## 2020-01-25 RX ADMIN — INSULIN LISPRO 12 UNITS: 100 INJECTION, SOLUTION INTRAVENOUS; SUBCUTANEOUS at 12:07

## 2020-01-25 RX ADMIN — ROSUVASTATIN CALCIUM 40 MG: 20 TABLET, FILM COATED ORAL at 09:58

## 2020-01-25 RX ADMIN — MUPIROCIN 1 APPLICATION: 20 OINTMENT TOPICAL at 10:00

## 2020-01-25 RX ADMIN — INSULIN LISPRO 4 UNITS: 100 INJECTION, SOLUTION INTRAVENOUS; SUBCUTANEOUS at 09:59

## 2020-01-25 RX ADMIN — LEVOFLOXACIN 500 MG: 500 TABLET, FILM COATED ORAL at 12:07

## 2020-01-26 ENCOUNTER — READMISSION MANAGEMENT (OUTPATIENT)
Dept: CALL CENTER | Facility: HOSPITAL | Age: 65
End: 2020-01-26

## 2020-01-26 PROBLEM — R91.8 MULTIPLE LUNG NODULES: Status: ACTIVE | Noted: 2020-01-26

## 2020-01-26 PROBLEM — N18.4 CKD (CHRONIC KIDNEY DISEASE) STAGE 4, GFR 15-29 ML/MIN: Status: ACTIVE | Noted: 2020-01-26

## 2020-01-26 NOTE — OUTREACH NOTE
Prep Survey      Responses   Facility patient discharged from?  [facility]   Is patient eligible?  Yes   Discharge diagnosis  Osteomyelitis of fifth toe of right foot, UTI (urinary tract infection), bacterial, CKD stage 3   Does the patient have one of the following disease processes/diagnoses(primary or secondary)?  Other   Does the patient have Home health ordered?  No   Is there a DME ordered?  No   Prep survey completed?  Yes          Krystina Dodge RN

## 2020-01-26 NOTE — PROGRESS NOTES
Subjective   Erick Luong is a 64 y.o. male.     Background: Pt with CKD, osteo foot, anasarca, wilder, 5 and 9 mm nodules in RLL on ct abd 1/22/20, 1 cm on ct abd 1/20/20, not reported 10/27/19 on ct angiogram chest    Chief Complaint   Patient presents with   • soft tissue nodule        History of Present Illness   I am asked to see him by Dr. Shetty for lung nodules.  Pt had 2 ct scans of the abdomen this month, apparently one of them ambulatory and the second one while inpatient, identifying incidental lung nodules in RLL.  Outside records from Dr. Shetty and hospitalization as well as radiology database at [facility] are reviewed, summarized in background above.  The patient was hospitalized with fever, prostatitis, and also osteomyelitis (chronic).  Pt essentially is a never smoker.  He has no history of lung disease or pulmonary symptoms.      Medical/Family/Social History   has a past medical history of Arthritis, CAD (coronary artery disease) (2/6/2017), Coronary artery disease, Diabetes mellitus (CMS/HCC), Gastroesophageal reflux disease (5/13/2019), HTN (hypertension), benign (5/3/2017), Hyperlipidemia, Hypertension, Mixed hyperlipidemia (2/7/2017), Myocardial infarction (CMS/HCC), Osteomyelitis (CMS/HCC) (1/22/2020), Pancreatitis, Persistent insomnia (1/20/2020), Renal disorder, Sleep apnea (2/6/2017), Sleep apnea with use of continuous positive airway pressure (CPAP), and Slow transit constipation (1/16/2019).   has a past surgical history that includes Appendectomy; Abdominal surgery; Cataract extraction; Cardiac surgery; Incision and drainage of wound (Left, 09/2007); Colonoscopy w/ polypectomy (03/04/2014); Esophagogastroduodenoscopy (04/13/2011); Cervical spine surgery; Colonoscopy (N/A, 1/31/2017); Esophagogastroduodenoscopy (N/A, 5/5/2017); Coronary artery bypass graft (10/2015); Back surgery; Esophagogastroduodenoscopy (N/A, 2/11/2019); and Colonoscopy (N/A, 2/11/2019).  family history  "includes Alzheimer's disease in his mother; Colon cancer in his father and sister; Colon polyps in his sister; Coronary artery disease in his sister and sister; Heart disease in his father.   reports that he has never smoked. He has never used smokeless tobacco. He reports that he does not drink alcohol or use drugs.  Allergies   Allergen Reactions   • Bactrim [Sulfamethoxazole-Trimethoprim] Other (See Comments)     \"RENAL FAILURE\"   • Vancomycin Itching   • Clindamycin Unknown - Low Severity   • Zolpidem Tartrate Unknown - Low Severity   • Metronidazole Rash     Medications    Current Outpatient Medications:   •  aspirin 81 MG EC tablet, Take 81 mg by mouth Daily., Disp: , Rfl:   •  bumetanide (BUMEX) 2 MG tablet, Take 1 tablet by mouth 2 (Two) Times a Day As Needed (edema)., Disp: 30 tablet, Rfl: 2  •  cloNIDine (CATAPRES) 0.1 MG tablet, Take 0.1 mg by mouth 2 (Two) Times a Day As Needed for High Blood Pressure (SBP >165 or DBP >95)., Disp: , Rfl:   •  DULoxetine (CYMBALTA) 60 MG capsule, Take 60 mg by mouth Daily., Disp: , Rfl:   •  gabapentin (NEURONTIN) 100 MG capsule, Take 100 mg by mouth 2 (Two) Times a Day., Disp: , Rfl:   •  insulin glargine (LANTUS) 100 UNIT/ML injection, Inject 20 Units under the skin into the appropriate area as directed Every Night., Disp: , Rfl:   •  insulin regular (humuLIN R) 500 UNIT/ML CONCENTRATED injection, Inject 100 Units under the skin 3 (Three) Times a Day Before Meals. Packaging states 100 units with regular meals; 120 units with large meals or 360 units daily, Disp: , Rfl:   •  lactulose (CHRONULAC) 10 GM/15ML solution, Take 20 g by mouth 3 (Three) Times a Day As Needed (constipation)., Disp: , Rfl:   •  latanoprost (XALATAN) 0.005 % ophthalmic solution, Administer 1 drop to both eyes Every Night., Disp: , Rfl:   •  oxyCODONE-acetaminophen (PERCOCET)  MG per tablet, Take 1 tablet by mouth 2 (Two) Times a Day., Disp: , Rfl:   •  pantoprazole (PROTONIX) 40 MG EC tablet, " "Take 1 tablet by mouth Daily., Disp: 90 tablet, Rfl: 3  •  rosuvastatin (CRESTOR) 40 MG tablet, Take 40 mg by mouth Every Night., Disp: , Rfl:   •  tamsulosin (FLOMAX) 0.4 MG capsule 24 hr capsule, Take 1 capsule by mouth Daily., Disp: 30 capsule, Rfl: 2  •  traZODone (DESYREL) 100 MG tablet, Take 100 mg by mouth At Night As Needed for Sleep., Disp: , Rfl:   •  levoFLOXacin (LEVAQUIN) 500 MG tablet, Take 1 tablet by mouth Daily for 26 doses. Indications: Prostatitis, Disp: 26 tablet, Rfl: 0    Review of Systems   Constitutional: Negative for chills, fever, unexpected weight gain and unexpected weight loss.   HENT: Negative for trouble swallowing and voice change.    Eyes: Negative for photophobia and visual disturbance.   Respiratory: Negative for shortness of breath.    Cardiovascular: Positive for leg swelling. Negative for chest pain.   Gastrointestinal: Negative for abdominal pain, nausea, vomiting and GERD.   Genitourinary: Negative for difficulty urinating and hematuria.   Musculoskeletal: Negative for gait problem and joint swelling.   Skin: Negative for pallor and skin lesions.   Neurological: Negative for tremors, weakness and confusion.   Hematological: Negative for adenopathy. Does not bruise/bleed easily.   Psychiatric/Behavioral: The patient is not nervous/anxious.        Objective   /80   Pulse 81   Ht 182.9 cm (72\")   Wt (!) 152 kg (336 lb)   SpO2 98% Comment: RA  BMI 45.57 kg/m²   Physical Exam   Constitutional: He appears well-developed and well-nourished. He does not appear ill. No distress.   HENT:   Head: Normocephalic and atraumatic.   Nose: Nose normal.   Eyes: Conjunctivae and EOM are normal.   Neck: Neck supple. No tracheal deviation present.   Cardiovascular: Normal rate, regular rhythm, S1 normal and S2 normal.   Pulmonary/Chest: Effort normal. No stridor. No respiratory distress. He has no wheezes. He has no rales.   Abdominal: Soft. He exhibits no distension. There is no " tenderness. There is no guarding.   Musculoskeletal: He exhibits no deformity.   Right foot in boot   Neurological: He is alert.   Skin: Skin is warm and dry. No rash noted.   Psychiatric: He has a normal mood and affect.        -----------------------------------------------------------------------------------------------    Ct Abdomen Pelvis Without Contrast    Result Date: 1/22/2020  Impression: 1. No acute process identified in the abdomen or pelvis. In particular, no acute process identified along the bowel. Moderate stool in the transverse and descending colon. 2. Stable small right lower lobe pulmonary nodules, the appearance being nonspecific. 3. Prior cholecystectomy. 4. 3.1 cm cystic right renal lesion imaged as a simple cyst on a prior contrast enhanced exam. 5. Advanced coronary atheromatous calcification. 6. These findings are in agreement with the critical and emergent findings from the [facility] preliminary report.  This report was finalized on 01/22/2020 07:09 by Dr King Ceballos, .    Ct Abdomen Pelvis Without Contrast    Result Date: 1/18/2020  Impression: 1. 1 cm right lower lobe pulmonary nodule, adjacent to the mediastinal pleura. Fleischner Society Recommendations for Management of SOLID Pulmonary Nodules:  If a nodule is greater than 8 mm in size, ALL patients require follow-up at 3, 9 and 24 months. PET/CT or biopsy should also be considered.  2. No acute process in the abdomen or pelvis. 3. Prior cholecystectomy. 4. Partially exophytic cystic lesion of the right inferior renal pole is poorly evaluated without contrast, however, this imaged as a simple cyst on a prior contrast enhanced exam. 5. Advanced coronary atheromatous calcification. 6. These findings are in agreement with the critical and emergent findings from the [facility] preliminary report.   This report was finalized on 01/18/2020 08:49 by Dr King Ceballos, .    Xr Spine Cervical Complete 4 Or 5 View    Result Date:  1/22/2020  Impression: 1. Mild to moderate disc space loss at C5-C6. 2. Neuroforaminal stenosis is also present at C5-C6. This report was finalized on 01/22/2020 07:18 by Dr. Sudarshan Starr MD.    Xr Shoulder 2+ View Left    Result Date: 1/22/2020  Impression: 1. No evidence of acute bony injury in the LEFT shoulder.   This report was finalized on 01/22/2020 07:16 by Dr. Sudarshan Starr MD.    Xr Shoulder 2+ View Left    Result Date: 1/20/2020  Impression: Impression: 1. Mild hypertrophic change right acromioclavicular joint. No acute abnormalities identified.   This report was finalized on 01/20/2020 06:50 by Dr. Eddie Winter MD.    Xr Shoulder 2+ View Right    Result Date: 1/22/2020  Impression: Impression: 1. Hypertrophic degenerative change right AC joint. No acute abnormalities identified.   This report was finalized on 01/22/2020 06:52 by Dr. Eddie Winter MD.    Xr Foot 3+ View Right    Result Date: 1/22/2020  Impression: 1. Osteomyelitis and soft tissue ulceration predominantly affecting the fifth metatarsal head and fifth metatarsophalangeal joint. This report was finalized on 01/22/2020 07:15 by Dr. Sudarshan Starr MD.    Xr Abdomen Flat & Upright    Result Date: 1/22/2020  Impression: 1. Findings suggestive of constipation. There is also gaseous distention of a loop of sigmoid colon. Continued follow-up is recommended.   This report was finalized on 01/22/2020 07:14 by Dr. Sudarshan Starr MD.    Xr Chest 1 View    Result Date: 1/22/2020  Impression: 1. Status post CABG. There is no acute cardiopulmonary process.   This report was finalized on 01/22/2020 06:48 by Dr. Eddie Winter MD.    My interpretation of chest ct: 2 small nodules in RLL, pleural based, one medially, one laterally  -----------------------------------------------------------------------------------------------         -----------------------------------------------------------------------------------------------  Assessment/Plan    Problem List Items Addressed This Visit        Pulmonary Problems    Sleep apnea with use of continuous positive airway pressure (CPAP)    Multiple lung nodules - Primary    Overview     5mm, 9 mm RLL identified 1/2020, not present 10/2019.         Relevant Orders    CT Chest Without Contrast       Other    Anasarca    CKD (chronic kidney disease) stage 4, GFR 15-29 ml/min (CMS/[facility])        Patient's Body mass index is 45.57 kg/m². BMI is above normal parameters. Recommendations include: referral to primary care.      We reviewed the above.  He is at very high risk for surgical complications and the only means of biopsying either of the above is surgery.  Also likelihood of malignancy is low due to nonsmoking status and negative recent ct is not consistent with cancer growth kinetics.  Continue tx fluid overload, ckd.  Will follow up ct chest in 3 mos.  Not approachable with bronchoscopy.  Continue tx for wilder and ckd, fluid management.       Electronically signed by Dougie Taylor MD, 1/27/2020, 10:21 AM

## 2020-01-27 ENCOUNTER — OFFICE VISIT (OUTPATIENT)
Dept: PULMONOLOGY | Facility: CLINIC | Age: 65
End: 2020-01-27

## 2020-01-27 VITALS
DIASTOLIC BLOOD PRESSURE: 80 MMHG | HEART RATE: 81 BPM | OXYGEN SATURATION: 98 % | BODY MASS INDEX: 42.66 KG/M2 | SYSTOLIC BLOOD PRESSURE: 130 MMHG | WEIGHT: 315 LBS | HEIGHT: 72 IN

## 2020-01-27 DIAGNOSIS — N18.4 CKD (CHRONIC KIDNEY DISEASE) STAGE 4, GFR 15-29 ML/MIN (HCC): ICD-10-CM

## 2020-01-27 DIAGNOSIS — G47.30 SLEEP APNEA WITH USE OF CONTINUOUS POSITIVE AIRWAY PRESSURE (CPAP): ICD-10-CM

## 2020-01-27 DIAGNOSIS — R60.1 ANASARCA: ICD-10-CM

## 2020-01-27 DIAGNOSIS — R91.8 MULTIPLE LUNG NODULES: Primary | ICD-10-CM

## 2020-01-27 LAB
BACTERIA SPEC AEROBE CULT: NORMAL
BACTERIA SPEC AEROBE CULT: NORMAL

## 2020-01-27 PROCEDURE — 99204 OFFICE O/P NEW MOD 45 MIN: CPT | Performed by: INTERNAL MEDICINE

## 2020-01-28 ENCOUNTER — READMISSION MANAGEMENT (OUTPATIENT)
Dept: CALL CENTER | Facility: HOSPITAL | Age: 65
End: 2020-01-28

## 2020-01-28 NOTE — OUTREACH NOTE
Medical Week 1 Survey      Responses   Facility patient discharged from?  [place]   Does the patient have one of the following disease processes/diagnoses(primary or secondary)?  Other   Is there a successful TCM telephone encounter documented?  No   Week 1 attempt successful?  No   Unsuccessful attempts  Attempt 1          Jenise Perez RN

## 2020-01-29 ENCOUNTER — READMISSION MANAGEMENT (OUTPATIENT)
Dept: CALL CENTER | Facility: HOSPITAL | Age: 65
End: 2020-01-29

## 2020-01-29 NOTE — OUTREACH NOTE
Medical Week 1 Survey      Responses   Facility patient discharged from?  [place]   Does the patient have one of the following disease processes/diagnoses(primary or secondary)?  Other   Is there a successful TCM telephone encounter documented?  No   Week 1 attempt successful?  Yes   Call start time  1537   Rescheduled  Rescheduled-pt requested [He states this is not a good time to talk. ]   Call end time  1541   Discharge diagnosis  Osteomyelitis of fifth toe of right foot, UTI (urinary tract infection), bacterial, CKD stage 3          Joanie Quinteros RN

## 2020-01-30 ENCOUNTER — READMISSION MANAGEMENT (OUTPATIENT)
Dept: CALL CENTER | Facility: HOSPITAL | Age: 65
End: 2020-01-30

## 2020-01-30 NOTE — OUTREACH NOTE
Medical Week 1 Survey      Responses   Facility patient discharged from?  [place]   Does the patient have one of the following disease processes/diagnoses(primary or secondary)?  Other   Is there a successful TCM telephone encounter documented?  No   Week 1 attempt successful?  No   Unsuccessful attempts  Attempt 2   Rescheduled  Revoked [No answer to rescheduled call.]          Ree Boone RN

## 2020-02-14 ENCOUNTER — TRANSCRIBE ORDERS (OUTPATIENT)
Dept: ADMINISTRATIVE | Facility: HOSPITAL | Age: 65
End: 2020-02-14

## 2020-02-14 ENCOUNTER — HOSPITAL ENCOUNTER (OUTPATIENT)
Dept: GENERAL RADIOLOGY | Facility: HOSPITAL | Age: 65
Discharge: HOME | End: 2020-02-14
Admitting: FAMILY MEDICINE

## 2020-02-14 DIAGNOSIS — R05.9 COUGH: ICD-10-CM

## 2020-02-14 DIAGNOSIS — R05.9 COUGH: Primary | ICD-10-CM

## 2020-02-14 PROCEDURE — 71046 X-RAY EXAM CHEST 2 VIEWS: CPT

## 2020-03-09 ENCOUNTER — APPOINTMENT (OUTPATIENT)
Dept: LAB | Facility: HOSPITAL | Age: 65
End: 2020-03-09

## 2020-03-09 ENCOUNTER — OFFICE VISIT (OUTPATIENT)
Dept: UROLOGY | Facility: CLINIC | Age: 65
End: 2020-03-09

## 2020-03-09 VITALS
BODY MASS INDEX: 42.66 KG/M2 | HEART RATE: 87 BPM | DIASTOLIC BLOOD PRESSURE: 82 MMHG | SYSTOLIC BLOOD PRESSURE: 132 MMHG | WEIGHT: 315 LBS | TEMPERATURE: 98.7 F | HEIGHT: 72 IN | RESPIRATION RATE: 20 BRPM | OXYGEN SATURATION: 98 %

## 2020-03-09 DIAGNOSIS — R50.9 FEVER, UNSPECIFIED FEVER CAUSE: ICD-10-CM

## 2020-03-09 DIAGNOSIS — R33.9 URINARY RETENTION: ICD-10-CM

## 2020-03-09 DIAGNOSIS — N17.9 AKI (ACUTE KIDNEY INJURY) (HCC): Primary | ICD-10-CM

## 2020-03-09 LAB
BASOPHILS # BLD AUTO: 0.05 10*3/MM3 (ref 0–0.2)
BASOPHILS NFR BLD AUTO: 0.5 % (ref 0–1.5)
BILIRUB BLD-MCNC: NEGATIVE MG/DL
CLARITY, POC: CLEAR
COLOR UR: YELLOW
DEPRECATED RDW RBC AUTO: 44.1 FL (ref 37–54)
EOSINOPHIL # BLD AUTO: 0.4 10*3/MM3 (ref 0–0.4)
EOSINOPHIL NFR BLD AUTO: 3.8 % (ref 0.3–6.2)
ERYTHROCYTE [DISTWIDTH] IN BLOOD BY AUTOMATED COUNT: 14 % (ref 12.3–15.4)
GLUCOSE UR STRIP-MCNC: NEGATIVE MG/DL
HCT VFR BLD AUTO: 35.4 % (ref 37.5–51)
HGB BLD-MCNC: 11.9 G/DL (ref 13–17.7)
IMM GRANULOCYTES # BLD AUTO: 0.06 10*3/MM3 (ref 0–0.05)
IMM GRANULOCYTES NFR BLD AUTO: 0.6 % (ref 0–0.5)
KETONES UR QL: NEGATIVE
LEUKOCYTE EST, POC: NEGATIVE
LYMPHOCYTES # BLD AUTO: 1.54 10*3/MM3 (ref 0.7–3.1)
LYMPHOCYTES NFR BLD AUTO: 14.8 % (ref 19.6–45.3)
MCH RBC QN AUTO: 29.6 PG (ref 26.6–33)
MCHC RBC AUTO-ENTMCNC: 33.6 G/DL (ref 31.5–35.7)
MCV RBC AUTO: 88.1 FL (ref 79–97)
MONOCYTES # BLD AUTO: 0.79 10*3/MM3 (ref 0.1–0.9)
MONOCYTES NFR BLD AUTO: 7.6 % (ref 5–12)
NEUTROPHILS # BLD AUTO: 7.59 10*3/MM3 (ref 1.7–7)
NEUTROPHILS NFR BLD AUTO: 72.7 % (ref 42.7–76)
NITRITE UR-MCNC: NEGATIVE MG/ML
NRBC BLD AUTO-RTO: 0 /100 WBC (ref 0–0.2)
PH UR: 5.5 [PH] (ref 5–8)
PLATELET # BLD AUTO: 223 10*3/MM3 (ref 140–450)
PMV BLD AUTO: 12 FL (ref 6–12)
PROT UR STRIP-MCNC: ABNORMAL MG/DL
RBC # BLD AUTO: 4.02 10*6/MM3 (ref 4.14–5.8)
RBC # UR STRIP: NEGATIVE /UL
SP GR UR: 1.03 (ref 1–1.03)
UROBILINOGEN UR QL: NORMAL
WBC NRBC COR # BLD: 10.43 10*3/MM3 (ref 3.4–10.8)

## 2020-03-09 PROCEDURE — 51798 US URINE CAPACITY MEASURE: CPT | Performed by: NURSE PRACTITIONER

## 2020-03-09 PROCEDURE — 81001 URINALYSIS AUTO W/SCOPE: CPT | Performed by: NURSE PRACTITIONER

## 2020-03-09 PROCEDURE — 36415 COLL VENOUS BLD VENIPUNCTURE: CPT

## 2020-03-09 PROCEDURE — 85025 COMPLETE CBC W/AUTO DIFF WBC: CPT | Performed by: NURSE PRACTITIONER

## 2020-03-09 PROCEDURE — 87086 URINE CULTURE/COLONY COUNT: CPT | Performed by: NURSE PRACTITIONER

## 2020-03-09 PROCEDURE — 99214 OFFICE O/P EST MOD 30 MIN: CPT | Performed by: NURSE PRACTITIONER

## 2020-03-09 RX ORDER — TAMSULOSIN HYDROCHLORIDE 0.4 MG/1
1 CAPSULE ORAL DAILY
Qty: 30 CAPSULE | Refills: 11 | Status: ON HOLD | OUTPATIENT
Start: 2020-03-09 | End: 2021-05-10

## 2020-03-09 RX ORDER — CYCLOBENZAPRINE HCL 5 MG
5 TABLET ORAL 2 TIMES DAILY PRN
COMMUNITY
Start: 2020-02-19 | End: 2021-01-18

## 2020-03-09 NOTE — PROGRESS NOTES
Mr. Luong is 64 y.o. male    Chief Complaint   Patient presents with   • Follow-up       History of Present Illness  Patient presents for hospital follow-up after episode of urinary retention thought to be secondary to prostatitis.  Prior to this episode of urinary retention, he had been struggling with increased urgency and poor stream.  He was placed on Flomax during hospital admission.  He has been voiding well since the catheter was removed during his hospital stay.  He finished his 28-day course of Levaquin a few days ago.  Today he is complaining of some nausea, fevers up to 100 °F 2-3 times per week.  He denies any urinary issues with stream, dysuria, hematuria, perineal pain.  His AUA symptom score is 18/35 with a bother score of 3.  He denies any aggravating or alleviating factors.  He does have several other chronic health issues that may be contributing to his current complaints including osteomyelitis of the right foot, diabetes, chronic renal insufficiency.    The following portions of the patient's history were reviewed and updated as appropriate: allergies, current medications, past family history, past medical history, past social history, past surgical history and problem list.    Review of Systems   Constitutional: Negative for chills and fever.   Gastrointestinal: Negative for abdominal distention, abdominal pain, nausea and vomiting.   Genitourinary: Positive for frequency and urgency. Negative for difficulty urinating, dysuria, flank pain and hematuria.         Current Outpatient Medications:   •  aspirin 81 MG EC tablet, Take 81 mg by mouth Daily., Disp: , Rfl:   •  bumetanide (BUMEX) 2 MG tablet, Take 1 tablet by mouth 2 (Two) Times a Day As Needed (edema)., Disp: 30 tablet, Rfl: 2  •  cloNIDine (CATAPRES) 0.1 MG tablet, Take 0.1 mg by mouth 2 (Two) Times a Day As Needed for High Blood Pressure (SBP >165 or DBP >95)., Disp: , Rfl:   •  DULoxetine (CYMBALTA) 60 MG capsule, Take 60 mg by mouth  Daily., Disp: , Rfl:   •  gabapentin (NEURONTIN) 100 MG capsule, Take 100 mg by mouth 2 (Two) Times a Day., Disp: , Rfl:   •  insulin glargine (LANTUS) 100 UNIT/ML injection, Inject 20 Units under the skin into the appropriate area as directed Every Night., Disp: , Rfl:   •  insulin regular (humuLIN R) 500 UNIT/ML CONCENTRATED injection, Inject 100 Units under the skin 3 (Three) Times a Day Before Meals. Packaging states 100 units with regular meals; 120 units with large meals or 360 units daily, Disp: , Rfl:   •  lactulose (CHRONULAC) 10 GM/15ML solution, Take 20 g by mouth 3 (Three) Times a Day As Needed (constipation)., Disp: , Rfl:   •  latanoprost (XALATAN) 0.005 % ophthalmic solution, Administer 1 drop to both eyes Every Night., Disp: , Rfl:   •  oxyCODONE-acetaminophen (PERCOCET)  MG per tablet, Take 1 tablet by mouth 2 (Two) Times a Day., Disp: , Rfl:   •  pantoprazole (PROTONIX) 40 MG EC tablet, Take 1 tablet by mouth Daily., Disp: 90 tablet, Rfl: 3  •  rosuvastatin (CRESTOR) 40 MG tablet, Take 40 mg by mouth Every Night., Disp: , Rfl:   •  tamsulosin (FLOMAX) 0.4 MG capsule 24 hr capsule, Take 1 capsule by mouth Daily., Disp: 30 capsule, Rfl: 11  •  traZODone (DESYREL) 100 MG tablet, Take 100 mg by mouth At Night As Needed for Sleep., Disp: , Rfl:   •  cyclobenzaprine (FLEXERIL) 5 MG tablet, Take 5 mg by mouth 2 (Two) Times a Day As Needed., Disp: , Rfl:   •  diclofenac (VOLTAREN) 1 % gel gel, APPLY 2 G 4 TIMES A DAY BY TOPICAL ROUTE AS NEEDED., Disp: , Rfl:     Past Medical History:   Diagnosis Date   • Arthritis    • CAD (coronary artery disease) 2/6/2017   • Coronary artery disease    • Diabetes mellitus (CMS/HCC)    • Gastroesophageal reflux disease 5/13/2019   • HTN (hypertension), benign 5/3/2017   • Hyperlipidemia    • Hypertension    • Mixed hyperlipidemia 2/7/2017   • Myocardial infarction (CMS/HCC)    • Osteomyelitis (CMS/HCC) 1/22/2020   • Pancreatitis    • Persistent insomnia 1/20/2020  "  • Renal disorder    • Sleep apnea 2/6/2017   • Sleep apnea with use of continuous positive airway pressure (CPAP)    • Slow transit constipation 1/16/2019       Past Surgical History:   Procedure Laterality Date   • ABDOMINAL SURGERY     • APPENDECTOMY     • BACK SURGERY     • CARDIAC SURGERY     • CATARACT EXTRACTION     • CERVICAL SPINE SURGERY     • COLONOSCOPY N/A 1/31/2017    Normal exam repeat in 5 years   • COLONOSCOPY N/A 2/11/2019    Procedure: COLONOSCOPY WITH ANESTHESIA;  Surgeon: Tyrell Smith MD;  Location: [facility] ENDOSCOPY;  Service: Gastroenterology   • COLONOSCOPY W/ POLYPECTOMY  03/04/2014    Hyperplastic polyp   • CORONARY ARTERY BYPASS GRAFT  10/2015   • ENDOSCOPY  04/13/2011    Gastritis with hemorrhage   • ENDOSCOPY N/A 5/5/2017    Normal exam   • ENDOSCOPY N/A 2/11/2019    Procedure: ESOPHAGOGASTRODUODENOSCOPY WITH ANESTHESIA;  Surgeon: Tyrell Smith MD;  Location: [facility] ENDOSCOPY;  Service: Gastroenterology   • INCISION AND DRAINAGE OF WOUND Left 09/2007    spider bite       Social History     Socioeconomic History   • Marital status:      Spouse name: Not on file   • Number of children: Not on file   • Years of education: Not on file   • Highest education level: Not on file   Tobacco Use   • Smoking status: Never Smoker   • Smokeless tobacco: Never Used   • Tobacco comment: smoked in highschool   Substance and Sexual Activity   • Alcohol use: No   • Drug use: No   • Sexual activity: Defer       Family History   Problem Relation Age of Onset   • Colon cancer Father    • Heart disease Father    • Colon cancer Sister    • Colon polyps Sister    • Alzheimer's disease Mother    • Coronary artery disease Sister    • Coronary artery disease Sister        Objective    /82 (BP Location: Left arm, Patient Position: Sitting)   Pulse 87   Temp 98.7 °F (37.1 °C) (Oral)   Resp 20   Ht 182.9 cm (72\")   Wt (!) 155 kg (342 lb)   SpO2 98%   BMI 46.38 kg/m²     Physical Exam "   Constitutional: He is oriented to person, place, and time. He appears well-developed and well-nourished.   HENT:   Head: Normocephalic.   Pulmonary/Chest: Effort normal. No respiratory distress.   Abdominal: He exhibits no distension.   Musculoskeletal: He exhibits edema.   Neurological: He is alert and oriented to person, place, and time.   Skin: Skin is warm and dry.   Psychiatric: He has a normal mood and affect. His behavior is normal.   Vitals reviewed.      No results displayed because visit has over 200 results.          Results for orders placed or performed in visit on 03/09/20   POC Urinalysis Dipstick, Automated   Result Value Ref Range    Color Yellow Yellow, Straw, Dark Yellow, Ritu    Clarity, UA Clear Clear    Specific Gravity  1.030 1.005 - 1.030    pH, Urine 5.5 5.0 - 8.0    Leukocytes Negative Negative    Nitrite, UA Negative Negative    Protein, POC 2+ (A) Negative mg/dL    Glucose, UA Negative Negative, 1000 mg/dL (3+) mg/dL    Ketones, UA Negative Negative    Urobilinogen, UA Normal Normal    Bilirubin Negative Negative    Blood, UA Negative Negative   Estimation of residual urine via abdominal ultrasound  Residual Urine: 56 ml  Indication: Urinary Retention  Position: Supine  Examination: Incremental scanning of the suprapubic area using 3 MHz transducer using copious amounts of acoustic gel.   Findings: An anechoic area was demonstrated which represented the bladder, with measurement of residual urine as noted. I inspected this myself. In that the residual urine was stable or insignificant, no treatment will be necessary at this time.     Does patient report urinary incontinence? no    Assessment/Plan   Assessment and Plan    Erick was seen today for follow-up.    Diagnoses and all orders for this visit:    WANDER (acute kidney injury) (CMS/[facility])  -     POC Urinalysis Dipstick, Automated    Urinary retention  -     tamsulosin (FLOMAX) 0.4 MG capsule 24 hr capsule; Take 1 capsule by mouth  Daily.    Fever, unspecified fever cause  -     CBC & Differential; Future  -     Comprehensive Metabolic Panel; Future  -     Urine Culture - Urine, Urine, Clean Catch    Patient presents for follow-up after episode of urinary retention thought to be secondary to prostatitis.  He has finished a 28-day course of Levaquin a few days ago.  He has had no significant issues with his voiding since his catheter was removed during his hospital stay.  He does still have a complaints of nausea and fever up to 100 °F.  I will go ahead and send his urine for culture just to ensure there is no infection present and treat with antibiotics if necessary.  I would also like the patient to get a CBC with differential and CMP prior to leaving our office today.    He is currently taking Flomax and denies any issues with this medication.  We will follow-up in 3 months.

## 2020-03-10 LAB — BACTERIA SPEC AEROBE CULT: NO GROWTH

## 2020-03-11 ENCOUNTER — TRANSCRIBE ORDERS (OUTPATIENT)
Dept: ADMINISTRATIVE | Facility: HOSPITAL | Age: 65
End: 2020-03-11

## 2020-03-11 ENCOUNTER — HOSPITAL ENCOUNTER (OUTPATIENT)
Dept: GENERAL RADIOLOGY | Facility: HOSPITAL | Age: 65
Discharge: HOME | End: 2020-03-11
Admitting: PODIATRIST

## 2020-03-11 ENCOUNTER — APPOINTMENT (OUTPATIENT)
Dept: LAB | Facility: HOSPITAL | Age: 65
End: 2020-03-11

## 2020-03-11 DIAGNOSIS — M79.671 RIGHT FOOT PAIN: ICD-10-CM

## 2020-03-11 DIAGNOSIS — L97.512 RIGHT FOOT ULCER, WITH FAT LAYER EXPOSED (HCC): Primary | ICD-10-CM

## 2020-03-11 DIAGNOSIS — E11.41 TYPE 2 DIABETES MELLITUS WITH DIABETIC MONONEUROPATHY, UNSPECIFIED WHETHER LONG TERM INSULIN USE (HCC): ICD-10-CM

## 2020-03-11 LAB
ALBUMIN SERPL-MCNC: 4.3 G/DL (ref 3.5–5.2)
ALBUMIN/GLOB SERPL: 1.7 G/DL
ALP SERPL-CCNC: 79 U/L (ref 39–117)
ALT SERPL W P-5'-P-CCNC: 16 U/L (ref 1–41)
ANION GAP SERPL CALCULATED.3IONS-SCNC: 15.6 MMOL/L (ref 5–15)
AST SERPL-CCNC: 16 U/L (ref 1–40)
BASOPHILS # BLD AUTO: 0.05 10*3/MM3 (ref 0–0.2)
BASOPHILS NFR BLD AUTO: 0.6 % (ref 0–1.5)
BILIRUB SERPL-MCNC: 0.4 MG/DL (ref 0.2–1.2)
BUN BLD-MCNC: 35 MG/DL (ref 8–23)
BUN/CREAT SERPL: 22.7 (ref 7–25)
CALCIUM SPEC-SCNC: 8.8 MG/DL (ref 8.6–10.5)
CHLORIDE SERPL-SCNC: 97 MMOL/L (ref 98–107)
CO2 SERPL-SCNC: 22.4 MMOL/L (ref 22–29)
CREAT BLD-MCNC: 1.54 MG/DL (ref 0.76–1.27)
CRP SERPL-MCNC: 0.44 MG/DL (ref 0–0.5)
DEPRECATED RDW RBC AUTO: 42.7 FL (ref 37–54)
EOSINOPHIL # BLD AUTO: 0.39 10*3/MM3 (ref 0–0.4)
EOSINOPHIL NFR BLD AUTO: 4.5 % (ref 0.3–6.2)
ERYTHROCYTE [DISTWIDTH] IN BLOOD BY AUTOMATED COUNT: 13.4 % (ref 12.3–15.4)
ERYTHROCYTE [SEDIMENTATION RATE] IN BLOOD: 49 MM/HR (ref 0–20)
GFR SERPL CREATININE-BSD FRML MDRD: 46 ML/MIN/1.73
GLOBULIN UR ELPH-MCNC: 2.5 GM/DL
GLUCOSE BLD-MCNC: 363 MG/DL (ref 65–99)
HBA1C MFR BLD: 9.24 % (ref 4.8–5.6)
HCT VFR BLD AUTO: 35.7 % (ref 37.5–51)
HGB BLD-MCNC: 11.7 G/DL (ref 13–17.7)
IMM GRANULOCYTES # BLD AUTO: 0.03 10*3/MM3 (ref 0–0.05)
IMM GRANULOCYTES NFR BLD AUTO: 0.3 % (ref 0–0.5)
LYMPHOCYTES # BLD AUTO: 1.47 10*3/MM3 (ref 0.7–3.1)
LYMPHOCYTES NFR BLD AUTO: 16.8 % (ref 19.6–45.3)
MCH RBC QN AUTO: 28.7 PG (ref 26.6–33)
MCHC RBC AUTO-ENTMCNC: 32.8 G/DL (ref 31.5–35.7)
MCV RBC AUTO: 87.7 FL (ref 79–97)
MONOCYTES # BLD AUTO: 0.6 10*3/MM3 (ref 0.1–0.9)
MONOCYTES NFR BLD AUTO: 6.9 % (ref 5–12)
NEUTROPHILS # BLD AUTO: 6.21 10*3/MM3 (ref 1.7–7)
NEUTROPHILS NFR BLD AUTO: 70.9 % (ref 42.7–76)
NRBC BLD AUTO-RTO: 0 /100 WBC (ref 0–0.2)
PLATELET # BLD AUTO: 216 10*3/MM3 (ref 140–450)
PMV BLD AUTO: 11.6 FL (ref 6–12)
POTASSIUM BLD-SCNC: 4.7 MMOL/L (ref 3.5–5.2)
PREALB SERPL-MCNC: 21.8 MG/DL (ref 20–40)
PROT SERPL-MCNC: 6.8 G/DL (ref 6–8.5)
RBC # BLD AUTO: 4.07 10*6/MM3 (ref 4.14–5.8)
SODIUM BLD-SCNC: 135 MMOL/L (ref 136–145)
WBC NRBC COR # BLD: 8.75 10*3/MM3 (ref 3.4–10.8)

## 2020-03-11 PROCEDURE — 73630 X-RAY EXAM OF FOOT: CPT

## 2020-03-11 PROCEDURE — 36415 COLL VENOUS BLD VENIPUNCTURE: CPT | Performed by: PODIATRIST

## 2020-03-11 PROCEDURE — 85652 RBC SED RATE AUTOMATED: CPT | Performed by: PODIATRIST

## 2020-03-11 PROCEDURE — 86140 C-REACTIVE PROTEIN: CPT | Performed by: PODIATRIST

## 2020-03-11 PROCEDURE — 84134 ASSAY OF PREALBUMIN: CPT | Performed by: PODIATRIST

## 2020-03-11 PROCEDURE — 80053 COMPREHEN METABOLIC PANEL: CPT | Performed by: PODIATRIST

## 2020-03-11 PROCEDURE — 83036 HEMOGLOBIN GLYCOSYLATED A1C: CPT | Performed by: PODIATRIST

## 2020-03-11 PROCEDURE — 85025 COMPLETE CBC W/AUTO DIFF WBC: CPT | Performed by: PODIATRIST

## 2020-04-27 ENCOUNTER — EPISODE CHANGES (OUTPATIENT)
Dept: CASE MANAGEMENT | Facility: OTHER | Age: 65
End: 2020-04-27

## 2020-04-29 ENCOUNTER — EPISODE CHANGES (OUTPATIENT)
Dept: CASE MANAGEMENT | Facility: OTHER | Age: 65
End: 2020-04-29

## 2020-05-01 ENCOUNTER — EPISODE CHANGES (OUTPATIENT)
Dept: CASE MANAGEMENT | Facility: OTHER | Age: 65
End: 2020-05-01

## 2020-05-13 ENCOUNTER — EPISODE CHANGES (OUTPATIENT)
Dept: CASE MANAGEMENT | Facility: OTHER | Age: 65
End: 2020-05-13

## 2020-05-15 ENCOUNTER — HOSPITAL ENCOUNTER (OUTPATIENT)
Dept: CT IMAGING | Facility: HOSPITAL | Age: 65
Discharge: HOME | End: 2020-05-15
Admitting: INTERNAL MEDICINE

## 2020-05-15 DIAGNOSIS — R91.8 MULTIPLE LUNG NODULES: ICD-10-CM

## 2020-05-15 PROCEDURE — 71250 CT THORAX DX C-: CPT

## 2020-05-18 ENCOUNTER — OFFICE VISIT (OUTPATIENT)
Dept: PULMONOLOGY | Facility: CLINIC | Age: 65
End: 2020-05-18

## 2020-05-18 DIAGNOSIS — N18.4 CKD (CHRONIC KIDNEY DISEASE) STAGE 4, GFR 15-29 ML/MIN (HCC): ICD-10-CM

## 2020-05-18 DIAGNOSIS — G47.30 SLEEP APNEA WITH USE OF CONTINUOUS POSITIVE AIRWAY PRESSURE (CPAP): ICD-10-CM

## 2020-05-18 DIAGNOSIS — R91.8 MULTIPLE LUNG NODULES: Primary | ICD-10-CM

## 2020-05-18 PROCEDURE — 99442 PR PHYS/QHP TELEPHONE EVALUATION 11-20 MIN: CPT | Performed by: INTERNAL MEDICINE

## 2020-05-18 NOTE — PROGRESS NOTES
You have chosen to receive care through a telephone visit. Do you consent to use a telephone visit for your medical care today? Yes  22

## 2020-05-18 NOTE — PROGRESS NOTES
Subjective   Erick Luong is a 64 y.o. male.     Background: Pt with CKD, osteo foot, anasarca, wilder, 5 and 9 mm nodules in RLL on ct abd 1/22/20, 1 cm on ct abd 1/20/20, not reported 10/27/19 on ct angiogram chest  [employer] case management is trying to reach him    This visit has been rescheduled as a phone visit to comply with patient safety concerns in accordance with CDC recommendations. Total time of discussion was 13 minutes.     Chief Complaint   Patient presents with   • MULIPLE LUNG NODULES        History of Present Illness   Patient follows up by telephone call.  No fevers chills or sweats or increased sputum production.  He did have a follow-up CT of the chest.  This was compared to an older CT of the chest and not compared to the abdomen CT which was done earlier this year.    Medical/Family/Social History   has a past medical history of Arthritis, CAD (coronary artery disease) (2/6/2017), Coronary artery disease, Diabetes mellitus (CMS/HCC), Gastroesophageal reflux disease (5/13/2019), HTN (hypertension), benign (5/3/2017), Hyperlipidemia, Hypertension, Mixed hyperlipidemia (2/7/2017), Myocardial infarction (CMS/HCC), Osteomyelitis (CMS/HCC) (1/22/2020), Pancreatitis, Persistent insomnia (1/20/2020), Renal disorder, Sleep apnea (2/6/2017), Sleep apnea with use of continuous positive airway pressure (CPAP), and Slow transit constipation (1/16/2019).   has a past surgical history that includes Appendectomy; Abdominal surgery; Cataract extraction; Cardiac surgery; Incision and drainage of wound (Left, 09/2007); Colonoscopy w/ polypectomy (03/04/2014); Esophagogastroduodenoscopy (04/13/2011); Cervical spine surgery; Colonoscopy (N/A, 1/31/2017); Esophagogastroduodenoscopy (N/A, 5/5/2017); Coronary artery bypass graft (10/2015); Back surgery; Esophagogastroduodenoscopy (N/A, 2/11/2019); and Colonoscopy (N/A, 2/11/2019).  family history includes Alzheimer's disease in his mother; Colon cancer in his  "father and sister; Colon polyps in his sister; Coronary artery disease in his sister and sister; Heart disease in his father.   reports that he has never smoked. He has never used smokeless tobacco. He reports that he does not drink alcohol or use drugs.  Allergies   Allergen Reactions   • Bactrim [Sulfamethoxazole-Trimethoprim] Other (See Comments)     \"RENAL FAILURE\"   • Vancomycin Itching   • Clindamycin Unknown - Low Severity   • Zolpidem Tartrate Unknown - Low Severity   • Metronidazole Rash     Medications    Current Outpatient Medications:   •  aspirin 81 MG EC tablet, Take 81 mg by mouth Daily., Disp: , Rfl:   •  bumetanide (BUMEX) 2 MG tablet, Take 1 tablet by mouth 2 (Two) Times a Day As Needed (edema)., Disp: 30 tablet, Rfl: 2  •  cloNIDine (CATAPRES) 0.1 MG tablet, Take 0.1 mg by mouth 2 (Two) Times a Day As Needed for High Blood Pressure (SBP >165 or DBP >95)., Disp: , Rfl:   •  cyclobenzaprine (FLEXERIL) 5 MG tablet, Take 5 mg by mouth 2 (Two) Times a Day As Needed., Disp: , Rfl:   •  diclofenac (VOLTAREN) 1 % gel gel, APPLY 2 G 4 TIMES A DAY BY TOPICAL ROUTE AS NEEDED., Disp: , Rfl:   •  DULoxetine (CYMBALTA) 60 MG capsule, Take 60 mg by mouth Daily., Disp: , Rfl:   •  gabapentin (NEURONTIN) 100 MG capsule, Take 100 mg by mouth 2 (Two) Times a Day., Disp: , Rfl:   •  insulin glargine (LANTUS) 100 UNIT/ML injection, Inject 20 Units under the skin into the appropriate area as directed Every Night., Disp: , Rfl:   •  insulin regular (humuLIN R) 500 UNIT/ML CONCENTRATED injection, Inject 100 Units under the skin 3 (Three) Times a Day Before Meals. Packaging states 100 units with regular meals; 120 units with large meals or 360 units daily, Disp: , Rfl:   •  lactulose (CHRONULAC) 10 GM/15ML solution, Take 20 g by mouth 3 (Three) Times a Day As Needed (constipation)., Disp: , Rfl:   •  latanoprost (XALATAN) 0.005 % ophthalmic solution, Administer 1 drop to both eyes Every Night., Disp: , Rfl:   •  " oxyCODONE-acetaminophen (PERCOCET)  MG per tablet, Take 1 tablet by mouth 2 (Two) Times a Day., Disp: , Rfl:   •  rosuvastatin (CRESTOR) 40 MG tablet, Take 40 mg by mouth Every Night., Disp: , Rfl:   •  tamsulosin (FLOMAX) 0.4 MG capsule 24 hr capsule, Take 1 capsule by mouth Daily., Disp: 30 capsule, Rfl: 11  •  traZODone (DESYREL) 100 MG tablet, Take 100 mg by mouth At Night As Needed for Sleep., Disp: , Rfl:   •  pantoprazole (PROTONIX) 40 MG EC tablet, Take 1 tablet by mouth Daily., Disp: 90 tablet, Rfl: 3       EXAMINATION: CT CHEST WO CONTRAST-      5/15/2020 3:32 PM CDT     HISTORY: follow up nodule; R91.8-Other nonspecific abnormal finding of  lung field     In order to have a CT radiation dose as low as reasonably achievable  Automated Exposure Control was utilized for adjustment of the mA and/or  KV according to patient size.     DLP in mGycm= 761.     Noncontrast chest CT compared with 10/27/2019 and 1/18/2018.     Normal heart size.  Coronary artery calcification.  CABG changes.     No mediastinal mass.     Cholecystectomy clips noted.     Patchy infiltrate/atelectasis has resolved.     Small pleural effusions have resolved.     9 x 6 mm subpleural nodule within the medial right lower lobe on series  3 image 116 is in an area that was obscured by infiltrate on the exam 7  months ago and this finding was not present in 2018.     The lungs are otherwise fully expanded and clear.     Summary:  1. 9 x 6 mm right lower lobe nodule for which 6 month noncontrast chest  CT imaging is recommended.       My interpretation of chest CT:, Left his current chest CT.  This shows 9 mm nodule unchanged from prior CT of the abdomen with cuts on the right, also showing a 9 mm nodule.    Assessment/Plan   Problem List Items Addressed This Visit        Pulmonary Problems    Sleep apnea with use of continuous positive airway pressure (CPAP)    Multiple lung nodules - Primary    Overview     5mm, 9 mm RLL identified  1/2020, not present 10/2019.            Other    CKD (chronic kidney disease) stage 4, GFR 15-29 ml/min (CMS/[facility])          We discussed the lung nodule finding.  We will need to continue following this.  We have had a 3-month follow-up.  We will recheck this in 6 months.  Fleischner's guidelines recommend ongoing work-up of these.  We will repeat the CT in 6 months.  This will be an approximate 9-month follow-up and will need additional follow-up after 24 months.  Other problems appear to be stable.  We also directed him to the [facility], [place], operated by [facility]  who was looking for him.       Electronically signed by Dougie Taylor MD, 5/18/2020, 10:58

## 2020-05-19 ENCOUNTER — EPISODE CHANGES (OUTPATIENT)
Dept: CASE MANAGEMENT | Facility: OTHER | Age: 65
End: 2020-05-19

## 2020-05-20 ENCOUNTER — EPISODE CHANGES (OUTPATIENT)
Dept: CASE MANAGEMENT | Facility: OTHER | Age: 65
End: 2020-05-20

## 2020-06-16 ENCOUNTER — EPISODE CHANGES (OUTPATIENT)
Dept: CASE MANAGEMENT | Facility: OTHER | Age: 65
End: 2020-06-16

## 2020-07-06 ENCOUNTER — TRANSCRIBE ORDERS (OUTPATIENT)
Dept: ADMINISTRATIVE | Facility: HOSPITAL | Age: 65
End: 2020-07-06

## 2020-07-06 DIAGNOSIS — IMO0002 DM (DIABETES MELLITUS), TYPE 2, UNCONTROLLED W/NEUROLOGIC COMPLICATION: Primary | ICD-10-CM

## 2020-07-06 DIAGNOSIS — E55.9 VITAMIN D DEFICIENCY, UNSPECIFIED: ICD-10-CM

## 2020-07-06 DIAGNOSIS — E11.49 TYPE 2 DIABETES MELLITUS WITH OTHER DIABETIC NEUROLOGICAL COMPLICATION (HCC): Primary | ICD-10-CM

## 2020-07-06 DIAGNOSIS — N40.0 BENIGN PROSTATIC HYPERPLASIA WITHOUT LOWER URINARY TRACT SYMPTOMS: ICD-10-CM

## 2020-07-06 DIAGNOSIS — I10 ESSENTIAL (PRIMARY) HYPERTENSION: ICD-10-CM

## 2020-08-21 NOTE — PROGRESS NOTES
"Adult Nutrition  Assessment/PES    Patient Name:  Erick Luong  YOB: 1955  MRN: [id number]  Admit Date:  11/6/2019    Assessment Date:  11/7/2019    Comments:  New admit to [facility]. Pt reports his appetite is fair however has been decreased for the last few weeks. Does not care for the food here. Declined supplements. Will continue to follow.     Reason for Assessment     Row Name 11/07/19 1028          Reason for Assessment    Reason For Assessment  physician consult     Diagnosis  diabetes diagnosis/complications;cardiac disease;renal disease     Identified At Risk by Screening Criteria  large or nonhealing wound, burn or pressure injury         Nutrition/Diet History     Row Name 11/07/19 1028          Nutrition/Diet History    Typical Food/Fluid Intake  pt reports a decreased appetite. states has been this way for a few weeks. does not love the food here.      Supplemental Drinks/Foods/Additives  declined nutrition supplements         Anthropometrics     Row Name 11/07/19 1034 11/07/19 1031       Anthropometrics    Height  182.9 cm (72\")  --       Admit Weight    Admit Weight  --  145 kg (320 lb)       Ideal Body Weight (IBW)    Ideal Body Weight (IBW) (kg)  82.07  --       Body Mass Index (BMI)    BMI Assessment  --  BMI 40 or greater: obesity grade III        Labs/Tests/Procedures/Meds     Row Name 11/07/19 1032          Labs/Procedures/Meds    Lab Results Reviewed  reviewed, pertinent     Lab Results Comments  glucose; Cr; BUN; GFR 41        Medications    Pertinent Medications Reviewed  reviewed     Pertinent Medications Comments  bumex; miralax; florastor;          Physical Findings     Row Name 11/07/19 1033          Physical Findings    Skin  other (see comments);non-healing wound(s) james 21         Estimated/Assessed Needs     Row Name 11/07/19 1034          Calculation Measurements    Weight Used For Calculations  97.1 kg (214 lb) adj bw     Height  182.9 cm (72\")        " Estimated/Assessed Needs    Additional Documentation  Calorie Requirements (Group);Fluid Requirements (Group);Protein Requirements (Group);KCAL/KG (Group)        Calorie Requirements    Estimated Calorie Requirement (kcal/day)  2426     Estimated Calorie Requirement Comment  [id number]        KCAL/KG    KCAL/KG  25 Kcal/Kg (kcal)     25 Kcal/Kg (kcal)  2426.75        Protein Requirements    Weight Used For Protein Calculations  97.1 kg (214 lb) adj bw     Est Protein Requirement Amount (gms/kg)  0.8 gm protein     Estimated Protein Requirements (gms/day)  77.66        Fluid Requirements    Estimated Fluid Requirement Method  RDA Method     Jack-Deborah Method (over 20 kg)  3441.4         Nutrition Prescription Ordered     Row Name 11/07/19 1035          Nutrition Prescription PO    Current PO Diet  Regular     Common Modifiers  Consistent Carbohydrate;Renal         Evaluation of Received Nutrient/Fluid Intake     Row Name 11/07/19 1034          Nutrient/Fluid Evaluation    Number of Days Evaluated  1 day     Additional Documentation  Fluid Intake Evaluation (Group)        Fluid Intake Evaluation    Oral Fluid (mL)  720     IV Fluid (mL)  100        PO Evaluation    Number of Days PO Intake Evaluated  Insufficient Data               Problem/Interventions:  Problem 1     Row Name 11/07/19 1036          Nutrition Diagnoses Problem 1    Problem 1  Limited Adherence to Diet Modifications     Etiology (related to)  MNT for Treatment/Condition     Cardiac  CHF     Endocrine  DM2     Renal  WANDER;CKD     Signs/Symptoms (evidenced by)  Biochemical;Report/Observation     Reported/Observed By  Patient     Other Comment   Pt with non-healing wounds exacerbated by uncontrolled diabetes.      Specific Labs Noted  Glucose;HgbA1C         Problem 2     Row Name 11/07/19 1037          Nutrition Diagnoses Problem 2    Problem 2  Altered Nutrition Related to Labs     Etiology (related to)  Medical Diagnosis     Renal  WANDER;CKD      Signs/Symptoms (evidenced by)  Biochemical     Specific Labs Noted  BUN;Creatinine;GFR             Intervention Goal     Row Name 11/07/19 1037          Intervention Goal    General  Improved nutrition related lab(s);Reduce/improve symptoms;Meet nutritional needs for age/condition;Disease management/therapy     PO  Establish PO;Meet estimated needs     Weight  Appropriate weight loss         Nutrition Intervention     Row Name 11/07/19 1037          Nutrition Intervention    RD/Tech Action  Follow Tx progress;Care plan reviewd;Encourage intake;Supplement offered/refused           Education/Evaluation     Row Name 11/07/19 1037          Education    Education  No discharge needs identified at this time        Monitor/Evaluation    Monitor  Per protocol           Electronically signed by:  Jessica Mccord  11/07/19 10:40 AM   Yes

## 2020-08-25 LAB
HOLD SPECIMEN: NORMAL
HOLD SPECIMEN: NORMAL

## 2020-08-30 ENCOUNTER — APPOINTMENT (OUTPATIENT)
Dept: GENERAL RADIOLOGY | Facility: HOSPITAL | Age: 65
End: 2020-08-30

## 2020-08-30 ENCOUNTER — HOSPITAL ENCOUNTER (OUTPATIENT)
Facility: HOSPITAL | Age: 65
Discharge: HOME | End: 2020-09-02
Attending: INTERNAL MEDICINE | Admitting: FAMILY MEDICINE

## 2020-08-30 DIAGNOSIS — R10.13 EPIGASTRIC ABDOMINAL PAIN: ICD-10-CM

## 2020-08-30 DIAGNOSIS — E13.00: Primary | ICD-10-CM

## 2020-08-30 LAB
ALBUMIN SERPL-MCNC: 4.1 G/DL (ref 3.5–5.2)
ALBUMIN/GLOB SERPL: 1.8 G/DL
ALP SERPL-CCNC: 86 U/L (ref 39–117)
ALT SERPL W P-5'-P-CCNC: 12 U/L (ref 1–41)
ANION GAP SERPL CALCULATED.3IONS-SCNC: 17 MMOL/L (ref 5–15)
AST SERPL-CCNC: 14 U/L (ref 1–40)
BASOPHILS # BLD AUTO: 0.05 10*3/MM3 (ref 0–0.2)
BASOPHILS NFR BLD AUTO: 0.5 % (ref 0–1.5)
BILIRUB SERPL-MCNC: 0.4 MG/DL (ref 0–1.2)
BUN SERPL-MCNC: 56 MG/DL (ref 8–23)
BUN/CREAT SERPL: 20.9 (ref 7–25)
CALCIUM SPEC-SCNC: 8.8 MG/DL (ref 8.6–10.5)
CHLORIDE SERPL-SCNC: 89 MMOL/L (ref 98–107)
CO2 SERPL-SCNC: 23 MMOL/L (ref 22–29)
CREAT SERPL-MCNC: 2.68 MG/DL (ref 0.76–1.27)
DEPRECATED RDW RBC AUTO: 42.5 FL (ref 37–54)
EOSINOPHIL # BLD AUTO: 0.38 10*3/MM3 (ref 0–0.4)
EOSINOPHIL NFR BLD AUTO: 3.9 % (ref 0.3–6.2)
ERYTHROCYTE [DISTWIDTH] IN BLOOD BY AUTOMATED COUNT: 13.4 % (ref 12.3–15.4)
GFR SERPL CREATININE-BSD FRML MDRD: 24 ML/MIN/1.73
GLOBULIN UR ELPH-MCNC: 2.3 GM/DL
GLUCOSE SERPL-MCNC: 733 MG/DL (ref 65–99)
HCT VFR BLD AUTO: 26.3 % (ref 37.5–51)
HGB BLD-MCNC: 9 G/DL (ref 13–17.7)
IMM GRANULOCYTES # BLD AUTO: 0.04 10*3/MM3 (ref 0–0.05)
IMM GRANULOCYTES NFR BLD AUTO: 0.4 % (ref 0–0.5)
LYMPHOCYTES # BLD AUTO: 1.18 10*3/MM3 (ref 0.7–3.1)
LYMPHOCYTES NFR BLD AUTO: 12 % (ref 19.6–45.3)
MCH RBC QN AUTO: 29.7 PG (ref 26.6–33)
MCHC RBC AUTO-ENTMCNC: 34.2 G/DL (ref 31.5–35.7)
MCV RBC AUTO: 86.8 FL (ref 79–97)
MONOCYTES # BLD AUTO: 0.84 10*3/MM3 (ref 0.1–0.9)
MONOCYTES NFR BLD AUTO: 8.6 % (ref 5–12)
NEUTROPHILS NFR BLD AUTO: 7.32 10*3/MM3 (ref 1.7–7)
NEUTROPHILS NFR BLD AUTO: 74.6 % (ref 42.7–76)
NRBC BLD AUTO-RTO: 0 /100 WBC (ref 0–0.2)
PLATELET # BLD AUTO: 230 10*3/MM3 (ref 140–450)
PMV BLD AUTO: 11.1 FL (ref 6–12)
POTASSIUM SERPL-SCNC: 4.5 MMOL/L (ref 3.5–5.2)
PROT SERPL-MCNC: 6.4 G/DL (ref 6–8.5)
RBC # BLD AUTO: 3.03 10*6/MM3 (ref 4.14–5.8)
SODIUM SERPL-SCNC: 129 MMOL/L (ref 136–145)
TROPONIN T SERPL-MCNC: 0.02 NG/ML (ref 0–0.03)
WBC # BLD AUTO: 9.81 10*3/MM3 (ref 3.4–10.8)

## 2020-08-30 PROCEDURE — 93005 ELECTROCARDIOGRAM TRACING: CPT | Performed by: INTERNAL MEDICINE

## 2020-08-30 PROCEDURE — 81003 URINALYSIS AUTO W/O SCOPE: CPT | Performed by: INTERNAL MEDICINE

## 2020-08-30 PROCEDURE — 80053 COMPREHEN METABOLIC PANEL: CPT | Performed by: INTERNAL MEDICINE

## 2020-08-30 PROCEDURE — 96374 THER/PROPH/DIAG INJ IV PUSH: CPT

## 2020-08-30 PROCEDURE — 84156 ASSAY OF PROTEIN URINE: CPT | Performed by: NURSE PRACTITIONER

## 2020-08-30 PROCEDURE — 85025 COMPLETE CBC W/AUTO DIFF WBC: CPT | Performed by: INTERNAL MEDICINE

## 2020-08-30 PROCEDURE — 63710000001 INSULIN REGULAR HUMAN PER 5 UNITS: Performed by: INTERNAL MEDICINE

## 2020-08-30 PROCEDURE — 99284 EMERGENCY DEPT VISIT MOD MDM: CPT

## 2020-08-30 PROCEDURE — 93010 ELECTROCARDIOGRAM REPORT: CPT | Performed by: INTERNAL MEDICINE

## 2020-08-30 PROCEDURE — 84540 ASSAY OF URINE/UREA-N: CPT | Performed by: NURSE PRACTITIONER

## 2020-08-30 PROCEDURE — 82570 ASSAY OF URINE CREATININE: CPT | Performed by: NURSE PRACTITIONER

## 2020-08-30 PROCEDURE — 84300 ASSAY OF URINE SODIUM: CPT | Performed by: NURSE PRACTITIONER

## 2020-08-30 PROCEDURE — 71045 X-RAY EXAM CHEST 1 VIEW: CPT

## 2020-08-30 PROCEDURE — 84484 ASSAY OF TROPONIN QUANT: CPT | Performed by: INTERNAL MEDICINE

## 2020-08-30 RX ORDER — SODIUM CHLORIDE 0.9 % (FLUSH) 0.9 %
10 SYRINGE (ML) INJECTION AS NEEDED
Status: DISCONTINUED | OUTPATIENT
Start: 2020-08-30 | End: 2020-09-02 | Stop reason: HOSPADM

## 2020-08-30 RX ORDER — ASPIRIN 81 MG/1
324 TABLET, CHEWABLE ORAL ONCE
Status: COMPLETED | OUTPATIENT
Start: 2020-08-30 | End: 2020-08-30

## 2020-08-30 RX ADMIN — INSULIN HUMAN 15 UNITS: 100 INJECTION, SOLUTION PARENTERAL at 23:43

## 2020-08-30 RX ADMIN — ASPIRIN 324 MG: 81 TABLET, CHEWABLE ORAL at 23:33

## 2020-08-31 ENCOUNTER — EPISODE CHANGES (OUTPATIENT)
Dept: CASE MANAGEMENT | Facility: OTHER | Age: 65
End: 2020-08-31

## 2020-08-31 ENCOUNTER — APPOINTMENT (OUTPATIENT)
Dept: CT IMAGING | Facility: HOSPITAL | Age: 65
End: 2020-08-31

## 2020-08-31 PROBLEM — E13.00 HYPEROSMOLARITY DUE TO SECONDARY DIABETES MELLITUS: Status: ACTIVE | Noted: 2020-08-31

## 2020-08-31 PROBLEM — E11.65 UNCONTROLLED TYPE 2 DIABETES MELLITUS WITH HYPERGLYCEMIA: Status: ACTIVE | Noted: 2020-08-31

## 2020-08-31 PROBLEM — Z91.199 MEDICALLY NONCOMPLIANT: Status: ACTIVE | Noted: 2020-08-31

## 2020-08-31 PROBLEM — E11.621 DIABETIC FOOT ULCER: Status: ACTIVE | Noted: 2020-08-31

## 2020-08-31 PROBLEM — L97.509 DIABETIC FOOT ULCER (HCC): Status: ACTIVE | Noted: 2020-08-31

## 2020-08-31 PROBLEM — N41.9 PROSTATITIS: Status: RESOLVED | Noted: 2020-01-25 | Resolved: 2020-08-31

## 2020-08-31 LAB
ANION GAP SERPL CALCULATED.3IONS-SCNC: 13 MMOL/L (ref 5–15)
ANION GAP SERPL CALCULATED.3IONS-SCNC: 15 MMOL/L (ref 5–15)
ANION GAP SERPL CALCULATED.3IONS-SCNC: 16 MMOL/L (ref 5–15)
BILIRUB UR QL STRIP: NEGATIVE
BUN SERPL-MCNC: 58 MG/DL (ref 8–23)
BUN SERPL-MCNC: 58 MG/DL (ref 8–23)
BUN SERPL-MCNC: 59 MG/DL (ref 8–23)
BUN/CREAT SERPL: 21.5 (ref 7–25)
BUN/CREAT SERPL: 22.7 (ref 7–25)
BUN/CREAT SERPL: 23.4 (ref 7–25)
CALCIUM SPEC-SCNC: 9.2 MG/DL (ref 8.6–10.5)
CALCIUM SPEC-SCNC: 9.2 MG/DL (ref 8.6–10.5)
CALCIUM SPEC-SCNC: 9.3 MG/DL (ref 8.6–10.5)
CHLORIDE SERPL-SCNC: 100 MMOL/L (ref 98–107)
CHLORIDE SERPL-SCNC: 96 MMOL/L (ref 98–107)
CHLORIDE SERPL-SCNC: 97 MMOL/L (ref 98–107)
CLARITY UR: CLEAR
CO2 SERPL-SCNC: 25 MMOL/L (ref 22–29)
CO2 SERPL-SCNC: 25 MMOL/L (ref 22–29)
CO2 SERPL-SCNC: 27 MMOL/L (ref 22–29)
COLOR UR: YELLOW
CREAT SERPL-MCNC: 2.52 MG/DL (ref 0.76–1.27)
CREAT SERPL-MCNC: 2.56 MG/DL (ref 0.76–1.27)
CREAT SERPL-MCNC: 2.7 MG/DL (ref 0.76–1.27)
CREAT UR-MCNC: 32.2 MG/DL
GFR SERPL CREATININE-BSD FRML MDRD: 24 ML/MIN/1.73
GFR SERPL CREATININE-BSD FRML MDRD: 25 ML/MIN/1.73
GFR SERPL CREATININE-BSD FRML MDRD: 26 ML/MIN/1.73
GLUCOSE BLDC GLUCOMTR-MCNC: 122 MG/DL (ref 70–130)
GLUCOSE BLDC GLUCOMTR-MCNC: 131 MG/DL (ref 70–130)
GLUCOSE BLDC GLUCOMTR-MCNC: 153 MG/DL (ref 70–130)
GLUCOSE BLDC GLUCOMTR-MCNC: 272 MG/DL (ref 70–130)
GLUCOSE BLDC GLUCOMTR-MCNC: 305 MG/DL (ref 70–130)
GLUCOSE BLDC GLUCOMTR-MCNC: 525 MG/DL (ref 70–130)
GLUCOSE SERPL-MCNC: 141 MG/DL (ref 65–99)
GLUCOSE SERPL-MCNC: 299 MG/DL (ref 65–99)
GLUCOSE SERPL-MCNC: 307 MG/DL (ref 65–99)
GLUCOSE UR STRIP-MCNC: ABNORMAL MG/DL
HBA1C MFR BLD: 10.9 % (ref 4.8–5.6)
HCT VFR BLD AUTO: 29.3 % (ref 37.5–51)
HEMOCCULT STL QL: NEGATIVE
HGB BLD-MCNC: 9.9 G/DL (ref 13–17.7)
HGB UR QL STRIP.AUTO: NEGATIVE
HOLD SPECIMEN: NORMAL
HOLD SPECIMEN: NORMAL
KETONES UR QL STRIP: NEGATIVE
LEUKOCYTE ESTERASE UR QL STRIP.AUTO: NEGATIVE
MAGNESIUM SERPL-MCNC: 2.5 MG/DL (ref 1.6–2.4)
MAGNESIUM SERPL-MCNC: 2.5 MG/DL (ref 1.6–2.4)
NITRITE UR QL STRIP: NEGATIVE
NT-PROBNP SERPL-MCNC: 1321 PG/ML (ref 0–900)
OSMOLALITY SERPL: 312 MOSM/KG (ref 289–308)
PH UR STRIP.AUTO: 7 [PH] (ref 5–8)
PHOSPHATE SERPL-MCNC: 3.6 MG/DL (ref 2.5–4.5)
PHOSPHATE SERPL-MCNC: 4 MG/DL (ref 2.5–4.5)
PHOSPHATE SERPL-MCNC: 4.4 MG/DL (ref 2.5–4.5)
POTASSIUM SERPL-SCNC: 3.8 MMOL/L (ref 3.5–5.2)
POTASSIUM SERPL-SCNC: 4 MMOL/L (ref 3.5–5.2)
POTASSIUM SERPL-SCNC: 4.2 MMOL/L (ref 3.5–5.2)
PROT UR QL STRIP: NEGATIVE
PROT UR-MCNC: 6.9 MG/DL
SARS-COV-2 RNA PNL SPEC NAA+PROBE: NOT DETECTED
SODIUM SERPL-SCNC: 137 MMOL/L (ref 136–145)
SODIUM SERPL-SCNC: 137 MMOL/L (ref 136–145)
SODIUM SERPL-SCNC: 140 MMOL/L (ref 136–145)
SODIUM UR-SCNC: 55 MMOL/L
SP GR UR STRIP: 1.02 (ref 1–1.03)
TROPONIN T SERPL-MCNC: 0.02 NG/ML (ref 0–0.03)
UROBILINOGEN UR QL STRIP: ABNORMAL
UUN 24H UR-MCNC: 332 MG/DL
WHOLE BLOOD HOLD SPECIMEN: NORMAL
WHOLE BLOOD HOLD SPECIMEN: NORMAL

## 2020-08-31 PROCEDURE — 83880 ASSAY OF NATRIURETIC PEPTIDE: CPT | Performed by: INTERNAL MEDICINE

## 2020-08-31 PROCEDURE — 83036 HEMOGLOBIN GLYCOSYLATED A1C: CPT | Performed by: INTERNAL MEDICINE

## 2020-08-31 PROCEDURE — 25010000002 ENOXAPARIN PER 10 MG: Performed by: INTERNAL MEDICINE

## 2020-08-31 PROCEDURE — 82272 OCCULT BLD FECES 1-3 TESTS: CPT | Performed by: FAMILY MEDICINE

## 2020-08-31 PROCEDURE — 71250 CT THORAX DX C-: CPT

## 2020-08-31 PROCEDURE — 96372 THER/PROPH/DIAG INJ SC/IM: CPT

## 2020-08-31 PROCEDURE — 84484 ASSAY OF TROPONIN QUANT: CPT | Performed by: INTERNAL MEDICINE

## 2020-08-31 PROCEDURE — 93010 ELECTROCARDIOGRAM REPORT: CPT | Performed by: INTERNAL MEDICINE

## 2020-08-31 PROCEDURE — 83735 ASSAY OF MAGNESIUM: CPT | Performed by: INTERNAL MEDICINE

## 2020-08-31 PROCEDURE — 25010000002 MORPHINE PER 10 MG: Performed by: INTERNAL MEDICINE

## 2020-08-31 PROCEDURE — G0378 HOSPITAL OBSERVATION PER HR: HCPCS

## 2020-08-31 PROCEDURE — 96376 TX/PRO/DX INJ SAME DRUG ADON: CPT

## 2020-08-31 PROCEDURE — 84100 ASSAY OF PHOSPHORUS: CPT | Performed by: INTERNAL MEDICINE

## 2020-08-31 PROCEDURE — 85018 HEMOGLOBIN: CPT | Performed by: FAMILY MEDICINE

## 2020-08-31 PROCEDURE — 96361 HYDRATE IV INFUSION ADD-ON: CPT

## 2020-08-31 PROCEDURE — 82962 GLUCOSE BLOOD TEST: CPT

## 2020-08-31 PROCEDURE — 93005 ELECTROCARDIOGRAM TRACING: CPT | Performed by: INTERNAL MEDICINE

## 2020-08-31 PROCEDURE — 87635 SARS-COV-2 COVID-19 AMP PRB: CPT | Performed by: INTERNAL MEDICINE

## 2020-08-31 PROCEDURE — 99214 OFFICE O/P EST MOD 30 MIN: CPT | Performed by: INTERNAL MEDICINE

## 2020-08-31 PROCEDURE — 63710000001 INSULIN DETEMIR PER 5 UNITS: Performed by: FAMILY MEDICINE

## 2020-08-31 PROCEDURE — 63710000001 INSULIN DETEMIR PER 5 UNITS: Performed by: INTERNAL MEDICINE

## 2020-08-31 PROCEDURE — 96375 TX/PRO/DX INJ NEW DRUG ADDON: CPT

## 2020-08-31 PROCEDURE — 85014 HEMATOCRIT: CPT | Performed by: FAMILY MEDICINE

## 2020-08-31 PROCEDURE — 25010000002 LEVOFLOXACIN PER 250 MG: Performed by: FAMILY MEDICINE

## 2020-08-31 PROCEDURE — 63710000001 INSULIN LISPRO (HUMAN) PER 5 UNITS: Performed by: FAMILY MEDICINE

## 2020-08-31 PROCEDURE — 74176 CT ABD & PELVIS W/O CONTRAST: CPT

## 2020-08-31 PROCEDURE — 25010000002 ENOXAPARIN PER 10 MG: Performed by: FAMILY MEDICINE

## 2020-08-31 PROCEDURE — 83930 ASSAY OF BLOOD OSMOLALITY: CPT | Performed by: INTERNAL MEDICINE

## 2020-08-31 PROCEDURE — 80048 BASIC METABOLIC PNL TOTAL CA: CPT | Performed by: INTERNAL MEDICINE

## 2020-08-31 RX ORDER — ROSUVASTATIN CALCIUM 20 MG/1
40 TABLET, COATED ORAL NIGHTLY
Status: DISCONTINUED | OUTPATIENT
Start: 2020-08-31 | End: 2020-09-02 | Stop reason: HOSPADM

## 2020-08-31 RX ORDER — CLONIDINE HYDROCHLORIDE 0.1 MG/1
0.1 TABLET ORAL 2 TIMES DAILY PRN
Status: DISCONTINUED | OUTPATIENT
Start: 2020-08-31 | End: 2020-09-02 | Stop reason: HOSPADM

## 2020-08-31 RX ORDER — GABAPENTIN 100 MG/1
100 CAPSULE ORAL 2 TIMES DAILY
Status: DISCONTINUED | OUTPATIENT
Start: 2020-08-31 | End: 2020-09-02 | Stop reason: HOSPADM

## 2020-08-31 RX ORDER — DEXTROSE MONOHYDRATE 25 G/50ML
25 INJECTION, SOLUTION INTRAVENOUS
Status: DISCONTINUED | OUTPATIENT
Start: 2020-08-31 | End: 2020-09-02 | Stop reason: HOSPADM

## 2020-08-31 RX ORDER — SODIUM CHLORIDE 9 MG/ML
30 INJECTION, SOLUTION INTRAVENOUS CONTINUOUS
Status: DISCONTINUED | OUTPATIENT
Start: 2020-08-31 | End: 2020-09-02 | Stop reason: HOSPADM

## 2020-08-31 RX ORDER — LACTULOSE 20 G/30ML
20 SOLUTION ORAL 3 TIMES DAILY PRN
Status: DISCONTINUED | OUTPATIENT
Start: 2020-08-31 | End: 2020-09-02 | Stop reason: HOSPADM

## 2020-08-31 RX ORDER — DEXTROSE MONOHYDRATE 25 G/50ML
12.5 INJECTION, SOLUTION INTRAVENOUS AS NEEDED
Status: DISCONTINUED | OUTPATIENT
Start: 2020-08-31 | End: 2020-08-31

## 2020-08-31 RX ORDER — LATANOPROST 50 UG/ML
1 SOLUTION/ DROPS OPHTHALMIC NIGHTLY
Status: DISCONTINUED | OUTPATIENT
Start: 2020-08-31 | End: 2020-09-02 | Stop reason: HOSPADM

## 2020-08-31 RX ORDER — NICOTINE POLACRILEX 4 MG
15 LOZENGE BUCCAL
Status: DISCONTINUED | OUTPATIENT
Start: 2020-08-31 | End: 2020-09-02 | Stop reason: HOSPADM

## 2020-08-31 RX ORDER — OXYCODONE AND ACETAMINOPHEN 10; 325 MG/1; MG/1
1 TABLET ORAL 2 TIMES DAILY
Status: DISCONTINUED | OUTPATIENT
Start: 2020-08-31 | End: 2020-09-02 | Stop reason: HOSPADM

## 2020-08-31 RX ORDER — SODIUM CHLORIDE 9 MG/ML
10 INJECTION, SOLUTION INTRAVENOUS CONTINUOUS PRN
Status: DISCONTINUED | OUTPATIENT
Start: 2020-08-31 | End: 2020-09-02 | Stop reason: HOSPADM

## 2020-08-31 RX ORDER — CYCLOBENZAPRINE HCL 10 MG
5 TABLET ORAL 2 TIMES DAILY PRN
Status: DISCONTINUED | OUTPATIENT
Start: 2020-08-31 | End: 2020-09-02 | Stop reason: HOSPADM

## 2020-08-31 RX ORDER — TAMSULOSIN HYDROCHLORIDE 0.4 MG/1
0.4 CAPSULE ORAL DAILY
Status: DISCONTINUED | OUTPATIENT
Start: 2020-08-31 | End: 2020-09-02 | Stop reason: HOSPADM

## 2020-08-31 RX ORDER — DULOXETIN HYDROCHLORIDE 30 MG/1
60 CAPSULE, DELAYED RELEASE ORAL DAILY
Status: DISCONTINUED | OUTPATIENT
Start: 2020-08-31 | End: 2020-09-02 | Stop reason: HOSPADM

## 2020-08-31 RX ORDER — SODIUM CHLORIDE 0.9 % (FLUSH) 0.9 %
3 SYRINGE (ML) INJECTION EVERY 12 HOURS SCHEDULED
Status: DISCONTINUED | OUTPATIENT
Start: 2020-08-31 | End: 2020-09-02 | Stop reason: HOSPADM

## 2020-08-31 RX ORDER — PANTOPRAZOLE SODIUM 40 MG/1
40 TABLET, DELAYED RELEASE ORAL DAILY
Status: DISCONTINUED | OUTPATIENT
Start: 2020-08-31 | End: 2020-09-02 | Stop reason: HOSPADM

## 2020-08-31 RX ORDER — SODIUM CHLORIDE 0.9 % (FLUSH) 0.9 %
10 SYRINGE (ML) INJECTION AS NEEDED
Status: DISCONTINUED | OUTPATIENT
Start: 2020-08-31 | End: 2020-09-02 | Stop reason: HOSPADM

## 2020-08-31 RX ORDER — LEVOFLOXACIN 5 MG/ML
250 INJECTION, SOLUTION INTRAVENOUS EVERY 24 HOURS
Status: DISCONTINUED | OUTPATIENT
Start: 2020-08-31 | End: 2020-09-02 | Stop reason: HOSPADM

## 2020-08-31 RX ORDER — SODIUM CHLORIDE 9 MG/ML
100 INJECTION, SOLUTION INTRAVENOUS CONTINUOUS
Status: DISCONTINUED | OUTPATIENT
Start: 2020-08-31 | End: 2020-08-31

## 2020-08-31 RX ORDER — SODIUM CHLORIDE 0.9 % (FLUSH) 0.9 %
10 SYRINGE (ML) INJECTION ONCE AS NEEDED
Status: DISCONTINUED | OUTPATIENT
Start: 2020-08-31 | End: 2020-09-02 | Stop reason: HOSPADM

## 2020-08-31 RX ORDER — DEXTROSE, SODIUM CHLORIDE, AND POTASSIUM CHLORIDE 5; .45; .3 G/100ML; G/100ML; G/100ML
150 INJECTION INTRAVENOUS CONTINUOUS PRN
Status: DISCONTINUED | OUTPATIENT
Start: 2020-08-31 | End: 2020-08-31

## 2020-08-31 RX ORDER — ASPIRIN 81 MG/1
81 TABLET ORAL DAILY
Status: DISCONTINUED | OUTPATIENT
Start: 2020-08-31 | End: 2020-09-02 | Stop reason: HOSPADM

## 2020-08-31 RX ORDER — TRAZODONE HYDROCHLORIDE 100 MG/1
100 TABLET ORAL NIGHTLY PRN
Status: DISCONTINUED | OUTPATIENT
Start: 2020-08-31 | End: 2020-09-02 | Stop reason: HOSPADM

## 2020-08-31 RX ORDER — BUMETANIDE 0.25 MG/ML
2 INJECTION INTRAMUSCULAR; INTRAVENOUS EVERY 12 HOURS SCHEDULED
Status: DISCONTINUED | OUTPATIENT
Start: 2020-08-31 | End: 2020-08-31

## 2020-08-31 RX ORDER — BUMETANIDE 0.25 MG/ML
2 INJECTION INTRAMUSCULAR; INTRAVENOUS
Status: DISCONTINUED | OUTPATIENT
Start: 2020-08-31 | End: 2020-09-02

## 2020-08-31 RX ADMIN — DICLOFENAC 2 G: 10 GEL TOPICAL at 21:32

## 2020-08-31 RX ADMIN — ASPIRIN 81 MG: 81 TABLET ORAL at 09:41

## 2020-08-31 RX ADMIN — LACTULOSE 20 G: 20 SOLUTION ORAL at 18:04

## 2020-08-31 RX ADMIN — GABAPENTIN 100 MG: 100 CAPSULE ORAL at 21:33

## 2020-08-31 RX ADMIN — CYCLOBENZAPRINE HYDROCHLORIDE 5 MG: 10 TABLET, FILM COATED ORAL at 18:15

## 2020-08-31 RX ADMIN — ENOXAPARIN SODIUM 40 MG: 40 INJECTION SUBCUTANEOUS at 08:30

## 2020-08-31 RX ADMIN — ENOXAPARIN SODIUM 40 MG: 40 INJECTION SUBCUTANEOUS at 21:33

## 2020-08-31 RX ADMIN — PANTOPRAZOLE SODIUM 40 MG: 40 TABLET, DELAYED RELEASE ORAL at 08:30

## 2020-08-31 RX ADMIN — ROSUVASTATIN CALCIUM 40 MG: 20 TABLET, FILM COATED ORAL at 21:33

## 2020-08-31 RX ADMIN — LATANOPROST 1 DROP: 50 SOLUTION OPHTHALMIC at 21:33

## 2020-08-31 RX ADMIN — INSULIN DETEMIR 20 UNITS: 100 INJECTION, SOLUTION SUBCUTANEOUS at 21:42

## 2020-08-31 RX ADMIN — TAMSULOSIN HYDROCHLORIDE 0.4 MG: 0.4 CAPSULE ORAL at 08:30

## 2020-08-31 RX ADMIN — OXYCODONE HYDROCHLORIDE AND ACETAMINOPHEN 1 TABLET: 10; 325 TABLET ORAL at 21:33

## 2020-08-31 RX ADMIN — DICLOFENAC 2 G: 10 GEL TOPICAL at 08:32

## 2020-08-31 RX ADMIN — INSULIN LISPRO 2 UNITS: 100 INJECTION, SOLUTION INTRAVENOUS; SUBCUTANEOUS at 18:04

## 2020-08-31 RX ADMIN — SODIUM CHLORIDE, PRESERVATIVE FREE 3 ML: 5 INJECTION INTRAVENOUS at 21:34

## 2020-08-31 RX ADMIN — SODIUM CHLORIDE 75 ML/HR: 9 INJECTION, SOLUTION INTRAVENOUS at 04:11

## 2020-08-31 RX ADMIN — GABAPENTIN 100 MG: 100 CAPSULE ORAL at 08:30

## 2020-08-31 RX ADMIN — BUMETANIDE 2 MG: 0.25 INJECTION INTRAMUSCULAR; INTRAVENOUS at 18:04

## 2020-08-31 RX ADMIN — BUMETANIDE 2 MG: 0.25 INJECTION, SOLUTION INTRAMUSCULAR; INTRAVENOUS at 04:10

## 2020-08-31 RX ADMIN — OXYCODONE HYDROCHLORIDE AND ACETAMINOPHEN 1 TABLET: 10; 325 TABLET ORAL at 08:30

## 2020-08-31 RX ADMIN — CYCLOBENZAPRINE HYDROCHLORIDE 5 MG: 10 TABLET, FILM COATED ORAL at 06:18

## 2020-08-31 RX ADMIN — DULOXETINE HYDROCHLORIDE 60 MG: 30 CAPSULE, DELAYED RELEASE ORAL at 08:30

## 2020-08-31 RX ADMIN — INSULIN DETEMIR 20 UNITS: 100 INJECTION, SOLUTION SUBCUTANEOUS at 05:22

## 2020-08-31 RX ADMIN — MORPHINE SULFATE 4 MG: 4 INJECTION, SOLUTION INTRAMUSCULAR; INTRAVENOUS at 02:18

## 2020-08-31 RX ADMIN — SODIUM CHLORIDE, PRESERVATIVE FREE 3 ML: 5 INJECTION INTRAVENOUS at 08:32

## 2020-08-31 RX ADMIN — LEVOFLOXACIN 250 MG: 250 INJECTION, SOLUTION INTRAVENOUS at 18:04

## 2020-09-01 ENCOUNTER — ANESTHESIA (OUTPATIENT)
Dept: GASTROENTEROLOGY | Facility: HOSPITAL | Age: 65
End: 2020-09-01

## 2020-09-01 ENCOUNTER — TELEPHONE (OUTPATIENT)
Dept: GASTROENTEROLOGY | Facility: CLINIC | Age: 65
End: 2020-09-01

## 2020-09-01 ENCOUNTER — ANESTHESIA EVENT (OUTPATIENT)
Dept: GASTROENTEROLOGY | Facility: HOSPITAL | Age: 65
End: 2020-09-01

## 2020-09-01 PROBLEM — R10.13 EPIGASTRIC ABDOMINAL PAIN: Status: ACTIVE | Noted: 2020-08-30

## 2020-09-01 PROBLEM — E13.00 HYPEROSMOLARITY DUE TO SECONDARY DIABETES MELLITUS: Status: ACTIVE | Noted: 2020-09-01

## 2020-09-01 LAB
25(OH)D3 SERPL-MCNC: 35.4 NG/ML (ref 30–100)
ANION GAP SERPL CALCULATED.3IONS-SCNC: 14 MMOL/L (ref 5–15)
BUN SERPL-MCNC: 47 MG/DL (ref 8–23)
BUN/CREAT SERPL: 22.4 (ref 7–25)
CALCIUM SPEC-SCNC: 8.8 MG/DL (ref 8.6–10.5)
CHLORIDE SERPL-SCNC: 96 MMOL/L (ref 98–107)
CO2 SERPL-SCNC: 25 MMOL/L (ref 22–29)
CREAT SERPL-MCNC: 2.1 MG/DL (ref 0.76–1.27)
GFR SERPL CREATININE-BSD FRML MDRD: 32 ML/MIN/1.73
GLUCOSE BLDC GLUCOMTR-MCNC: 257 MG/DL (ref 70–130)
GLUCOSE BLDC GLUCOMTR-MCNC: 321 MG/DL (ref 70–130)
GLUCOSE BLDC GLUCOMTR-MCNC: 344 MG/DL (ref 70–130)
GLUCOSE BLDC GLUCOMTR-MCNC: 361 MG/DL (ref 70–130)
GLUCOSE BLDC GLUCOMTR-MCNC: 369 MG/DL (ref 70–130)
GLUCOSE SERPL-MCNC: 389 MG/DL (ref 65–99)
HCT VFR BLD AUTO: 28.2 % (ref 37.5–51)
HCT VFR BLD AUTO: 31.4 % (ref 37.5–51)
HGB BLD-MCNC: 10.2 G/DL (ref 13–17.7)
HGB BLD-MCNC: 9.5 G/DL (ref 13–17.7)
MAGNESIUM SERPL-MCNC: 2.3 MG/DL (ref 1.6–2.4)
POTASSIUM SERPL-SCNC: 4.7 MMOL/L (ref 3.5–5.2)
PTH-INTACT SERPL-MCNC: 103 PG/ML (ref 15–65)
SODIUM SERPL-SCNC: 135 MMOL/L (ref 136–145)
URATE SERPL-MCNC: 9.2 MG/DL (ref 3.4–7)

## 2020-09-01 PROCEDURE — 83735 ASSAY OF MAGNESIUM: CPT | Performed by: FAMILY MEDICINE

## 2020-09-01 PROCEDURE — 83970 ASSAY OF PARATHORMONE: CPT | Performed by: FAMILY MEDICINE

## 2020-09-01 PROCEDURE — 25010000002 PROPOFOL 10 MG/ML EMULSION: Performed by: NURSE ANESTHETIST, CERTIFIED REGISTERED

## 2020-09-01 PROCEDURE — 82962 GLUCOSE BLOOD TEST: CPT

## 2020-09-01 PROCEDURE — G0378 HOSPITAL OBSERVATION PER HR: HCPCS

## 2020-09-01 PROCEDURE — 84550 ASSAY OF BLOOD/URIC ACID: CPT | Performed by: FAMILY MEDICINE

## 2020-09-01 PROCEDURE — 85018 HEMOGLOBIN: CPT | Performed by: FAMILY MEDICINE

## 2020-09-01 PROCEDURE — 63710000001 INSULIN LISPRO (HUMAN) PER 5 UNITS: Performed by: FAMILY MEDICINE

## 2020-09-01 PROCEDURE — 25010000002 LEVOFLOXACIN PER 250 MG: Performed by: FAMILY MEDICINE

## 2020-09-01 PROCEDURE — 88342 IMHCHEM/IMCYTCHM 1ST ANTB: CPT | Performed by: INTERNAL MEDICINE

## 2020-09-01 PROCEDURE — 82306 VITAMIN D 25 HYDROXY: CPT | Performed by: FAMILY MEDICINE

## 2020-09-01 PROCEDURE — 85014 HEMATOCRIT: CPT | Performed by: FAMILY MEDICINE

## 2020-09-01 PROCEDURE — 80048 BASIC METABOLIC PNL TOTAL CA: CPT | Performed by: FAMILY MEDICINE

## 2020-09-01 PROCEDURE — 63710000001 INSULIN DETEMIR PER 5 UNITS: Performed by: FAMILY MEDICINE

## 2020-09-01 PROCEDURE — 88305 TISSUE EXAM BY PATHOLOGIST: CPT | Performed by: INTERNAL MEDICINE

## 2020-09-01 PROCEDURE — 25010000002 ENOXAPARIN PER 10 MG: Performed by: FAMILY MEDICINE

## 2020-09-01 PROCEDURE — 43239 EGD BIOPSY SINGLE/MULTIPLE: CPT | Performed by: INTERNAL MEDICINE

## 2020-09-01 PROCEDURE — 93005 ELECTROCARDIOGRAM TRACING: CPT | Performed by: INTERNAL MEDICINE

## 2020-09-01 PROCEDURE — 63710000001 INSULIN LISPRO (HUMAN) PER 5 UNITS: Performed by: NURSE PRACTITIONER

## 2020-09-01 PROCEDURE — 93010 ELECTROCARDIOGRAM REPORT: CPT | Performed by: INTERNAL MEDICINE

## 2020-09-01 RX ORDER — SODIUM CHLORIDE 0.9 % (FLUSH) 0.9 %
10 SYRINGE (ML) INJECTION AS NEEDED
Status: DISCONTINUED | OUTPATIENT
Start: 2020-09-01 | End: 2020-09-01 | Stop reason: HOSPADM

## 2020-09-01 RX ORDER — SODIUM CHLORIDE 0.9 % (FLUSH) 0.9 %
10 SYRINGE (ML) INJECTION EVERY 12 HOURS SCHEDULED
Status: DISCONTINUED | OUTPATIENT
Start: 2020-09-01 | End: 2020-09-01 | Stop reason: HOSPADM

## 2020-09-01 RX ORDER — SODIUM CHLORIDE 9 MG/ML
100 INJECTION, SOLUTION INTRAVENOUS CONTINUOUS
Status: DISCONTINUED | OUTPATIENT
Start: 2020-09-01 | End: 2020-09-02 | Stop reason: HOSPADM

## 2020-09-01 RX ORDER — SUCRALFATE 1 G/1
2 TABLET ORAL
Status: DISCONTINUED | OUTPATIENT
Start: 2020-09-01 | End: 2020-09-02 | Stop reason: HOSPADM

## 2020-09-01 RX ORDER — SUCRALFATE 1 G/1
1 TABLET ORAL
Status: CANCELLED | OUTPATIENT
Start: 2020-09-01

## 2020-09-01 RX ORDER — PROPOFOL 10 MG/ML
VIAL (ML) INTRAVENOUS AS NEEDED
Status: DISCONTINUED | OUTPATIENT
Start: 2020-09-01 | End: 2020-09-01 | Stop reason: SURG

## 2020-09-01 RX ADMIN — BUMETANIDE 2 MG: 0.25 INJECTION INTRAMUSCULAR; INTRAVENOUS at 18:34

## 2020-09-01 RX ADMIN — GABAPENTIN 100 MG: 100 CAPSULE ORAL at 21:05

## 2020-09-01 RX ADMIN — INSULIN DETEMIR 20 UNITS: 100 INJECTION, SOLUTION SUBCUTANEOUS at 21:12

## 2020-09-01 RX ADMIN — ENOXAPARIN SODIUM 40 MG: 40 INJECTION SUBCUTANEOUS at 21:05

## 2020-09-01 RX ADMIN — OXYCODONE HYDROCHLORIDE AND ACETAMINOPHEN 1 TABLET: 10; 325 TABLET ORAL at 21:05

## 2020-09-01 RX ADMIN — INSULIN LISPRO 10 UNITS: 100 INJECTION, SOLUTION INTRAVENOUS; SUBCUTANEOUS at 11:55

## 2020-09-01 RX ADMIN — LIDOCAINE HYDROCHLORIDE 100 MG: 20 INJECTION, SOLUTION INTRAVENOUS at 12:43

## 2020-09-01 RX ADMIN — GABAPENTIN 100 MG: 100 CAPSULE ORAL at 10:26

## 2020-09-01 RX ADMIN — OXYCODONE HYDROCHLORIDE AND ACETAMINOPHEN 1 TABLET: 10; 325 TABLET ORAL at 10:26

## 2020-09-01 RX ADMIN — CYCLOBENZAPRINE HYDROCHLORIDE 5 MG: 10 TABLET, FILM COATED ORAL at 22:54

## 2020-09-01 RX ADMIN — LACTULOSE 20 G: 20 SOLUTION ORAL at 15:25

## 2020-09-01 RX ADMIN — DULOXETINE HYDROCHLORIDE 60 MG: 30 CAPSULE, DELAYED RELEASE ORAL at 15:25

## 2020-09-01 RX ADMIN — SODIUM CHLORIDE 30 ML/HR: 9 INJECTION, SOLUTION INTRAVENOUS at 10:26

## 2020-09-01 RX ADMIN — SUCRALFATE 2 G: 1 TABLET ORAL at 18:44

## 2020-09-01 RX ADMIN — LATANOPROST 1 DROP: 50 SOLUTION OPHTHALMIC at 21:06

## 2020-09-01 RX ADMIN — LIDOCAINE HYDROCHLORIDE 100 MG: 20 INJECTION, SOLUTION INTRAVENOUS at 12:46

## 2020-09-01 RX ADMIN — TRAZODONE HYDROCHLORIDE 100 MG: 100 TABLET ORAL at 22:54

## 2020-09-01 RX ADMIN — INSULIN LISPRO 8 UNITS: 100 INJECTION, SOLUTION INTRAVENOUS; SUBCUTANEOUS at 10:26

## 2020-09-01 RX ADMIN — PROPOFOL 240 MG: 10 INJECTION, EMULSION INTRAVENOUS at 12:43

## 2020-09-01 RX ADMIN — TRAZODONE HYDROCHLORIDE 100 MG: 100 TABLET ORAL at 01:16

## 2020-09-01 RX ADMIN — PANTOPRAZOLE SODIUM 40 MG: 40 TABLET, DELAYED RELEASE ORAL at 15:24

## 2020-09-01 RX ADMIN — DICLOFENAC 2 G: 10 GEL TOPICAL at 21:06

## 2020-09-01 RX ADMIN — INSULIN LISPRO 8 UNITS: 100 INJECTION, SOLUTION INTRAVENOUS; SUBCUTANEOUS at 18:34

## 2020-09-01 RX ADMIN — ROSUVASTATIN CALCIUM 40 MG: 20 TABLET, FILM COATED ORAL at 21:05

## 2020-09-01 RX ADMIN — SODIUM CHLORIDE 100 ML/HR: 9 INJECTION, SOLUTION INTRAVENOUS at 12:05

## 2020-09-01 RX ADMIN — LEVOFLOXACIN 250 MG: 250 INJECTION, SOLUTION INTRAVENOUS at 18:34

## 2020-09-01 RX ADMIN — TAMSULOSIN HYDROCHLORIDE 0.4 MG: 0.4 CAPSULE ORAL at 15:25

## 2020-09-01 NOTE — TELEPHONE ENCOUNTER
He is actually a [facility] patient so please send this message to his team for office visit with Yuliana.    Vicki Valle MD

## 2020-09-01 NOTE — PROGRESS NOTES
[facility] Medicine Services  INPATIENT PROGRESS NOTE    Patient Name: Erick Luong  Date of Admission: 8/30/2020  Today's Date: 09/01/20  Length of Stay: 0  Primary Care Physician: Del Shetty MD    Subjective   Chief Complaint: Follow up generalized malaise  HPI   Patient resting comfortably in bed.  Patient reports feeling better today.  He is tolerating room air.  He denies shortness of breath, chest pain or pressure.  Reports lower extremity swelling is improving.  He has had good results with diuresis.  He denies nausea, vomiting, abdominal pain diarrhea.  He is tolerating a diet.  Patient voices no new concerns at this time.    Review of Systems   All pertinent negatives and positives are as above. All other systems have been reviewed and are negative unless otherwise stated.     Objective    Temp:  [97.7 °F (36.5 °C)-98.5 °F (36.9 °C)] 97.8 °F (36.6 °C)  Heart Rate:  [64-82] 66  Resp:  [13-20] 16  BP: (101-160)/(51-75) 160/69  Physical Exam   Constitutional: He is oriented to person, place, and time. He appears well-developed and well-nourished. No distress.   Lying in bed.  No distress.  Tolerating room air.   HENT:   Head: Atraumatic.   Mouth/Throat: No oropharyngeal exudate.   Eyes: EOM are normal.   Neck: Neck supple.   Cardiovascular: Normal rate, regular rhythm, normal heart sounds and intact distal pulses.   Pulmonary/Chest: Effort normal. No respiratory distress. He has decreased breath sounds.   Abdominal: Soft. Bowel sounds are normal. He exhibits no distension. There is no tenderness. There is no guarding.   Protuberant.   Musculoskeletal: Normal range of motion. He exhibits edema (BLE).   Lymphadenopathy:     He has no cervical adenopathy.   Neurological: He is alert and oriented to person, place, and time. No cranial nerve deficit.   Skin: Skin is warm and dry. There is erythema.   Psychiatric: He has a normal mood and affect. His behavior is normal.    Nursing note and vitals reviewed.    Results Review:  I have reviewed the labs, radiology results, and diagnostic studies.    Laboratory Data:   Results from last 7 days   Lab Units 09/01/20  0826 08/31/20  2039 08/30/20  2246   WBC 10*3/mm3  --   --  9.81   HEMOGLOBIN g/dL 9.5* 9.9* 9.0*   HEMATOCRIT % 28.2* 29.3* 26.3*   PLATELETS 10*3/mm3  --   --  230        Results from last 7 days   Lab Units 09/01/20  0826 08/31/20  0800 08/31/20  0333 08/30/20  2246   SODIUM mmol/L 135* 140 137  137 129*   POTASSIUM mmol/L 4.7 3.8 4.2  4.0 4.5   CHLORIDE mmol/L 96* 100 96*  97* 89*   CO2 mmol/L 25.0 27.0 25.0  25.0 23.0   BUN mg/dL 47* 59* 58*  58* 56*   CREATININE mg/dL 2.10* 2.52* 2.70*  2.56* 2.68*   CALCIUM mg/dL 8.8 9.2 9.3  9.2 8.8   BILIRUBIN mg/dL  --   --   --  0.4   ALK PHOS U/L  --   --   --  86   ALT (SGPT) U/L  --   --   --  12   AST (SGOT) U/L  --   --   --  14   GLUCOSE mg/dL 389* 141* 307*  299* 733*     I have reviewed the patient's current medications.     Assessment/Plan     Active Hospital Problems    Diagnosis   • **Acute kidney injury superimposed on chronic kidney disease (CMS/HCC)   • Hyperosmolarity due to secondary diabetes mellitus (CMS/[facility])   • Uncontrolled type 2 diabetes mellitus with hyperglycemia (CMS/[facility])   • Medically noncompliant   • Diabetic foot ulcer (CMS/[facility])   • Epigastric abdominal pain     Added automatically from request for surgery [id number]     • CKD (chronic kidney disease) stage 4, GFR 15-29 ml/min (CMS/HCC)   • Sleep apnea with use of continuous positive airway pressure (CPAP)   • Anasarca   • Morbid obesity with BMI of 45.0-49.9, adult (CMS/[facility])   • Anemia due to chronic kidney disease   • HTN (hypertension), benign   • Type 2 diabetes mellitus with hyperglycemia, with long-term current use of insulin (CMS/[facility])     Plan:  1.  On 8/30 patient presented for complaints of feeling poorly.  He also complained of diarrhea and vomiting.  Question of black stool and  melena/black emesis.  Blood sugar on admission was noted to be 7.33 and creatinine elevated at 2.68 with an estimated GFR of 24.  2.  He was evaluated by gastroenterology.  Hemoglobin and hematocrit stable.  Hemoccult negative.  He had an EGD today - showed normal esophagus, nonerosive gastritis with hemorrhage -biopsied.  Normal examined duodenum.  Okay to resume diet recommendations are to continue PPI and add Carafate for a month.  Patient will need follow-up with Dr. Smith.  3.  He is being evaluated by nephrology.  Patient has baseline chronic kidney disease stage IV.  Baseline creatinine approximately 2.  On admission creatinine noted to be 2.68.  Renal function is near baseline, creatinine is 2.10 today.  Sodium 135.  IV Bumex and IV fluid management per nephrology.  4.  Hemoglobin A1c noted to be 10.9%.  Last blood glucose 369, 361, 321.  Increase sliding scale to moderate high dose.  Levemir 20 units nightly.  5.  Lovenox for VTE prophylaxis.    Discharge Planning: I expect the patient to be discharged to home likely tomorrow.    Electronically signed by SUSAN Roger, 09/01/20, 14:40.

## 2020-09-01 NOTE — PROGRESS NOTES
"Nephrology ([facility]) Progress Note      Patient:  Erick Luong  YOB: 1955  Date of Service: 9/1/2020  MRN: [id number]   Acct: [id number]   Primary Care Physician: Del Shetty MD  Advance Directive:   Code Status and Medical Interventions:   Ordered at: 08/31/20 0320     Level Of Support Discussed With:    Patient     Code Status:    CPR     Medical Interventions (Level of Support Prior to Arrest):    Full     Admit Date: 8/30/2020       Hospital Day: 0  Referring Provider: Abebe Garzon DO      Patient personally seen and examined.  Complete chart including Consults, Notes, Operative Reports, Labs, Cardiology, and Radiology studies reviewed as able.        Subjective:  Erick Luong is a 65 y.o. male  whom we were consulted for acute kidney injury.  Baseline chronic kidney disease stage 4.  Has not been seen in our office since March 2019. Review of chart indicates baseline creatinine was approximately 2.  History of poorly controlled type 2 diabetes, hypertension with chronic orthostatic hypotension, congestive heart failure, CAD with prior CABG.  Presented to emergency department with complaint of \"not feeling well\".  Has had increasing lower extremity edema for several days, dyspnea with exertion.  Burning midsternal chest pain not associated with movement or PO intake. He stated it feels different than his prior MI. Has also been having melenotic stools and a few episodes of emesis over the last few days. Denied recent changes in urination, no dark or foamy urine. Denied dysuria or hematuria. Denied NSAIDs. Moved to medical floor on 8/31    Today feeling better, improving dyspnea. Scheduled for upper endoscopy today    Allergies:  Bactrim [sulfamethoxazole-trimethoprim]; Vancomycin; Clindamycin; Zolpidem tartrate; and Metronidazole    Home Meds:  Medications Prior to Admission   Medication Sig Dispense Refill Last Dose   • aspirin 81 MG EC tablet Take 81 mg " by mouth Daily.   Taking   • bumetanide (BUMEX) 2 MG tablet Take 1 tablet by mouth 2 (Two) Times a Day As Needed (edema). 30 tablet 2 Taking   • cloNIDine (CATAPRES) 0.1 MG tablet Take 0.1 mg by mouth 2 (Two) Times a Day As Needed for High Blood Pressure (SBP >165 or DBP >95).   Taking   • cyclobenzaprine (FLEXERIL) 5 MG tablet Take 5 mg by mouth 2 (Two) Times a Day As Needed.   Taking   • diclofenac (VOLTAREN) 1 % gel gel APPLY 2 G 4 TIMES A DAY BY TOPICAL ROUTE AS NEEDED.   Taking   • DULoxetine (CYMBALTA) 60 MG capsule Take 60 mg by mouth Daily.   Taking   • gabapentin (NEURONTIN) 100 MG capsule Take 100 mg by mouth 2 (Two) Times a Day.   Taking   • insulin glargine (LANTUS) 100 UNIT/ML injection Inject 20 Units under the skin into the appropriate area as directed Every Night.   Taking   • insulin regular (humuLIN R) 500 UNIT/ML CONCENTRATED injection Inject 100 Units under the skin 3 (Three) Times a Day Before Meals. Packaging states 100 units with regular meals; 120 units with large meals or 360 units daily   Taking   • lactulose (CHRONULAC) 10 GM/15ML solution Take 20 g by mouth 3 (Three) Times a Day As Needed (constipation).   Taking   • latanoprost (XALATAN) 0.005 % ophthalmic solution Administer 1 drop to both eyes Every Night.   Taking   • oxyCODONE-acetaminophen (PERCOCET)  MG per tablet Take 1 tablet by mouth 2 (Two) Times a Day.   Taking   • pantoprazole (PROTONIX) 40 MG EC tablet Take 1 tablet by mouth Daily. 90 tablet 3 Not Taking   • rosuvastatin (CRESTOR) 40 MG tablet Take 40 mg by mouth Every Night.   Taking   • tamsulosin (FLOMAX) 0.4 MG capsule 24 hr capsule Take 1 capsule by mouth Daily. 30 capsule 11 Taking   • traZODone (DESYREL) 100 MG tablet Take 100 mg by mouth At Night As Needed for Sleep.   Taking       Medicines:  Current Facility-Administered Medications   Medication Dose Route Frequency Provider Last Rate Last Dose   • aspirin EC tablet 81 mg  81 mg Oral Daily Payam Keyes  S, DO   81 mg at 08/31/20 0941   • bumetanide (BUMEX) injection 2 mg  2 mg Intravenous BID Payam Keyes DO   2 mg at 08/31/20 1804   • cloNIDine (CATAPRES) tablet 0.1 mg  0.1 mg Oral BID PRN Payam Keyes DO       • cyclobenzaprine (FLEXERIL) tablet 5 mg  5 mg Oral BID PRN Payam Keyes DO   5 mg at 08/31/20 1815   • dextrose (D50W) 25 g/ 50mL Intravenous Solution 25 g  25 g Intravenous Q15 Min PRN Payam Keyes DO       • dextrose (GLUTOSE) oral gel 15 g  15 g Oral Q15 Min PRN Payam Keyes DO       • diclofenac (VOLTAREN) 1 % gel 2 g  2 g Topical BID Payam Keyes DO   2 g at 08/31/20 2132   • DULoxetine (CYMBALTA) DR capsule 60 mg  60 mg Oral Daily Payam Keyes DO   60 mg at 08/31/20 0830   • enoxaparin (LOVENOX) syringe 40 mg  40 mg Subcutaneous Q12H Payam Keyes DO   40 mg at 08/31/20 2133   • gabapentin (NEURONTIN) capsule 100 mg  100 mg Oral BID Payam Keyes DO   100 mg at 09/01/20 1026   • glucagon (human recombinant) (GLUCAGEN DIAGNOSTIC) injection 1 mg  1 mg Subcutaneous Q15 Min PRN Payam Keyes DO       • insulin detemir (LEVEMIR) injection 20 Units  20 Units Subcutaneous Nightly Payam Keyes DO   20 Units at 08/31/20 2142   • insulin lispro (humaLOG) injection 0-9 Units  0-9 Units Subcutaneous TID AC Payam Keyes DO   8 Units at 09/01/20 1026   • lactulose solution 20 g  20 g Oral TID PRN Payam Keyes DO   20 g at 08/31/20 1804   • latanoprost (XALATAN) 0.005 % ophthalmic solution 1 drop  1 drop Both Eyes Nightly Payam Keyes DO   1 drop at 08/31/20 2133   • levoFLOXacin (LEVAQUIN) 250 mg/50 mL D5W (premix) 250 mg  250 mg Intravenous Q24H Payam Keyes DO   250 mg at 08/31/20 1804   • oxyCODONE-acetaminophen (PERCOCET)  MG per tablet 1 tablet  1 tablet Oral BID Payam Keyes DO   1 tablet at 09/01/20 1026   • pantoprazole (PROTONIX) EC tablet 40 mg  40 mg Oral Daily Wali  Payam S, DO   40 mg at 08/31/20 0830   • rosuvastatin (CRESTOR) tablet 40 mg  40 mg Oral Nightly Wali, Payam S, DO   40 mg at 08/31/20 2133   • sodium chloride 0.9 % flush 10 mL  10 mL Intravenous PRN Wali, Payam S, DO       • sodium chloride 0.9 % flush 10 mL  10 mL Intravenous PRN Keyes, Payam S, DO       • sodium chloride 0.9 % flush 10 mL  10 mL Intravenous Once PRN Keyes, Payam S, DO       • sodium chloride 0.9 % flush 3 mL  3 mL Intravenous Q12H Keyes, Payam S, DO   3 mL at 08/31/20 2134   • sodium chloride 0.9 % infusion  10 mL/hr Intravenous Continuous PRN Keyes, Payam S, DO       • sodium chloride 0.9 % infusion  30 mL/hr Intravenous Continuous Keyes, Payam S, DO 30 mL/hr at 09/01/20 1026 30 mL/hr at 09/01/20 1026   • tamsulosin (FLOMAX) 24 hr capsule 0.4 mg  0.4 mg Oral Daily Missy Keyesurice S, DO   0.4 mg at 08/31/20 0830   • traZODone (DESYREL) tablet 100 mg  100 mg Oral Nightly PRN Missy Keyesurice S, DO   100 mg at 09/01/20 0116       Past Medical History:  Past Medical History:   Diagnosis Date   • Arthritis    • CAD (coronary artery disease) 2/6/2017   • Coronary artery disease    • Diabetes mellitus (CMS/[facility])    • Gastroesophageal reflux disease 5/13/2019   • HTN (hypertension), benign 5/3/2017   • Hyperlipidemia    • Hypertension    • Mixed hyperlipidemia 2/7/2017   • Multiple lung nodules 1/26/2020    5mm, 9 mm RLL identified 1/2020, not present 10/2019.   • Myocardial infarction (CMS/HCC)    • Osteomyelitis (CMS/HCC) 1/22/2020   • Osteomyelitis of fifth toe of right foot (CMS/HCC) 10/7/2019   • Pancreatitis    • Persistent insomnia 1/20/2020   • Renal disorder    • Sleep apnea 2/6/2017   • Sleep apnea with use of continuous positive airway pressure (CPAP)    • Slow transit constipation 1/16/2019       Past Surgical History:  Past Surgical History:   Procedure Laterality Date   • ABDOMINAL SURGERY     • APPENDECTOMY     • BACK SURGERY     • CARDIAC SURGERY      • CATARACT EXTRACTION     • CERVICAL SPINE SURGERY     • COLONOSCOPY N/A 1/31/2017    Normal exam repeat in 5 years   • COLONOSCOPY N/A 2/11/2019    Procedure: COLONOSCOPY WITH ANESTHESIA;  Surgeon: Tyrell Smith MD;  Location: [facility] ENDOSCOPY;  Service: Gastroenterology   • COLONOSCOPY W/ POLYPECTOMY  03/04/2014    Hyperplastic polyp   • CORONARY ARTERY BYPASS GRAFT  10/2015   • ENDOSCOPY  04/13/2011    Gastritis with hemorrhage   • ENDOSCOPY N/A 5/5/2017    Normal exam   • ENDOSCOPY N/A 2/11/2019    Procedure: ESOPHAGOGASTRODUODENOSCOPY WITH ANESTHESIA;  Surgeon: Tyrell Smith MD;  Location: [facility] ENDOSCOPY;  Service: Gastroenterology   • INCISION AND DRAINAGE OF WOUND Left 09/2007    spider bite       Family History  Family History   Problem Relation Age of Onset   • Colon cancer Father    • Heart disease Father    • Colon cancer Sister    • Colon polyps Sister    • Alzheimer's disease Mother    • Coronary artery disease Sister    • Coronary artery disease Sister        Social History  Social History     Socioeconomic History   • Marital status:      Spouse name: Not on file   • Number of children: Not on file   • Years of education: Not on file   • Highest education level: Not on file   Tobacco Use   • Smoking status: Never Smoker   • Smokeless tobacco: Never Used   • Tobacco comment: smoked in highschool   Substance and Sexual Activity   • Alcohol use: No   • Drug use: No   • Sexual activity: Defer         Review of Systems:  History obtained from chart review and the patient  General ROS: No fever or chills  Respiratory ROS: No cough, shortness of breath, wheezing  Cardiovascular ROS: +edema, dyspnea on exertion.  No chest pain or palpitations  Gastrointestinal ROS: No abdominal pain or melena  Genito-Urinary ROS: No dysuria or hematuria  Neurological ROS; no headache or dizziness    Objective:  Patient Vitals for the past 24 hrs:   BP Temp Temp src Pulse Resp SpO2   09/01/20 0745 133/65  98.5 °F (36.9 °C) Oral 82 18 94 %   09/01/20 0500 141/68 98 °F (36.7 °C) Oral 74 20 98 %   09/01/20 0010 134/51 97.9 °F (36.6 °C) Oral 72 18 95 %   08/31/20 2100 120/53 97.9 °F (36.6 °C) Oral 78 18 98 %   08/31/20 1621 124/51 97.7 °F (36.5 °C) Oral 65 18 98 %   08/31/20 1315 114/57 -- -- 66 20 96 %   08/31/20 1300 118/62 -- -- 67 -- 96 %   08/31/20 1245 118/63 -- -- 67 -- 95 %   08/31/20 1230 109/70 -- -- 67 -- 98 %   08/31/20 1215 110/62 98 °F (36.7 °C) Oral 69 -- 96 %   08/31/20 1200 131/65 -- -- 70 20 98 %   08/31/20 1130 118/65 -- -- 68 -- 96 %   08/31/20 1115 114/65 -- -- 69 -- 98 %       Intake/Output Summary (Last 24 hours) at 9/1/2020 1104  Last data filed at 9/1/2020 0746  Gross per 24 hour   Intake 255 ml   Output 3350 ml   Net -3095 ml     General: awake/alert    Neck: supple, no JVD  Chest:  clear to auscultation bilaterally without respiratory distress  CVS: regular rate and rhythm  Abdominal: obese and soft, nontender, positive bowel sounds  Extremities: 2+ lower extremity edema  Skin: warm and dry without rash  Neuro: no focal motor deficits    Labs:  Results from last 7 days   Lab Units 09/01/20  0826 08/31/20 2039 08/30/20  2246   WBC 10*3/mm3  --   --  9.81   HEMOGLOBIN g/dL 9.5* 9.9* 9.0*   HEMATOCRIT % 28.2* 29.3* 26.3*   PLATELETS 10*3/mm3  --   --  230         Results from last 7 days   Lab Units 09/01/20  0826 08/31/20  0800 08/31/20  0333 08/30/20  2246   SODIUM mmol/L 135* 140 137  137 129*   POTASSIUM mmol/L 4.7 3.8 4.2  4.0 4.5   CHLORIDE mmol/L 96* 100 96*  97* 89*   CO2 mmol/L 25.0 27.0 25.0  25.0 23.0   BUN mg/dL 47* 59* 58*  58* 56*   CREATININE mg/dL 2.10* 2.52* 2.70*  2.56* 2.68*   CALCIUM mg/dL 8.8 9.2 9.3  9.2 8.8   BILIRUBIN mg/dL  --   --   --  0.4   ALK PHOS U/L  --   --   --  86   ALT (SGPT) U/L  --   --   --  12   AST (SGOT) U/L  --   --   --  14   GLUCOSE mg/dL 389* 141* 307*  299* 733*       Radiology:   Imaging Results (Last 72 Hours)     Procedure Component Value  Units Date/Time    SCANNED - IMAGING [[id number]] Resulted:  08/30/20      Updated:  08/31/20 1458    CT Chest Without Contrast [[id number]] Collected:  08/31/20 0755     Updated:  08/31/20 0803    Narrative:       EXAMINATION: CT CHEST WO CONTRAST- 8/31/2020 7:55 AM CDT     HISTORY: Chest pain, shortness of breath     COMPARISON: CT chest without contrast 5/15/2020, CT chest without  contrast 1/18/2018     DOSE: 444 mGy-cm     TECHNIQUE: Sequential imaging was performed from the thoracic inlet  through the upper abdomen without the use of IV contrast.  Sagittal and  coronal reformations were made from the original source data and  reviewed. Automated exposure control was also utilized to decrease  patient radiation dose.     FINDINGS:   The visualized thyroid gland is grossly normal in appearance. The  trachea and main bronchi appear widely patent and in normal anatomic  position. The esophagus is grossly normal in appearance.     No pathologically enlarged axillary, mediastinal, or hilar lymph nodes  are identified.     The heart appears normal in size. There has been prior CABG. Coronary  artery calcifications are present. Atherosclerotic calcifications are  also seen within the aorta and its branch vessels. The ascending  thoracic aorta and main pulmonary artery appear normal in caliber. There  is no pericardial or pleural effusion.     A noncalcified nodule is seen in the medial right lower lobe measuring 9  mm in size, stable from multiple prior exams dating to January 2018.  Mild bronchial wall thickening is noted. The lungs otherwise appear  clear.     Please see the separate CT abdomen and pelvis report from the same day  for findings in the upper abdomen.     Review of the visualized osseous structures demonstrates no acute or  aggressive lesions. Degenerative osteophyte formation is noted in the  spine. There has been prior median sternotomy.        Impression:          1. No acute findings in the chest.      2. Atherosclerosis of the aorta and coronary arteries, status post CABG.     3. Mild bronchial wall thickening.     A preliminary report was provided by stat rad.     This report was finalized on 08/31/2020 08:00 by Dr. Justen Figueroa MD.    CT Abdomen Pelvis Without Contrast [[id number]] Collected:  08/31/20 0741     Updated:  08/31/20 0749    Narrative:       EXAMINATION: CT ABDOMEN PELVIS WO CONTRAST- 8/31/2020 7:41 AM CDT     HISTORY: Abdominal pain     COMPARISON: CT abdomen and pelvis without contrast 1/21/2020     DOSE: 1874 mGy-cm     TECHNIQUE: Sequential imaging was performed from the lung bases through  the pubic symphysis without the use of IV contrast.  Sagittal and  coronal reformations were made from the original source data and  reviewed. Automated exposure control was also utilized to decrease  patient radiation dose.     FINDINGS:   Please see the separate CT chest report from same day for findings in  the lung bases. A noncalcified nodule is noted in the medial right lower  lobe, stable.     Evaluation is limited due to the lack of IV contrast. The gallbladder is  surgically absent. The liver, spleen, and adrenal glands are  unremarkable. There is diffuse fatty atrophy of the pancreas.  Low-density lesions are noted in the kidneys bilaterally, incompletely  characterized on this exam but stable compared to the prior exam. There  is nonspecific bilateral perinephric stranding.     The abdominal aorta appears normal in caliber. There are scattered  atherosclerotic calcifications of the aorta and its branch vessels.     No pathologically enlarged central mesenteric or retroperitoneal lymph  nodes are identified.     The stomach and small bowel appear normal in caliber. A large amount  stool is noted throughout the colon, suggesting fecal stasis. Large  bowel is otherwise grossly normal in appearance. No free air is seen in  the abdomen.     Urinary bladder is grossly normal in appearance. No free  fluid is seen  in the pelvis. No pelvic or inguinal lymphadenopathy is identified.  There is a fat-containing right inguinal hernia. No free fluid is seen  in the pelvis. There is some mild stranding of the inferior ventral  abdominal wall.     Review of the visualized osseous structures demonstrates no acute or  aggressive lesions. Degenerative osteophyte formation is noted in the  spine. Degenerative changes are also noted in the SI joints. There has  been prior median sternotomy.       Impression:       1. Fecal stasis.     2. Otherwise, no acute findings in the abdomen or pelvis.     3. Atherosclerotic disease.  4. Nonspecific bilateral perinephric stranding, which is often seen with  chronic kidney disease.  5. Mild soft tissue stranding of the ventral abdominal wall inferiorly.     A preliminary report was provided by stat [facility].     This report was finalized on 08/31/2020 07:46 by Dr. Justen Figueroa MD.    XR Chest 1 View [[id number]] Collected:  08/31/20 0649     Updated:  08/31/20 0652    Narrative:       Frontal upright radiograph of the chest 8/30/2020 11:17 PM CDT     COMPARISON: February 14, 2020     HISTORY: Chest pain.     FINDINGS:   The lungs are clear. The cardiac silhouette is normal. Wires are present  from previous median sternotomy and CABG.      The osseous structures and surrounding soft tissues demonstrate no acute  abnormality.       Impression:       1. No radiographic evidence of acute cardiopulmonary process.        This report was finalized on 08/31/2020 06:49 by Dr. Eddie Winter MD.          Culture:  No results found for: BLOODCX, URINECX, WOUNDCX, MRSACX, RESPCX, STOOLCX      Assessment   1.  Acute kidney injury--improving  2.  Baseline chronic kidney disease stage 4  3.  Benign hypertension  4.  Poorly controlled type 2 diabetes  5.  Volume overload--improving  6.  Possible GI bleed  7.  Anemia of CKD and GI blood loss    Plan:  1.  Continue IV Bumex  2.  Planning for EGD today  3.   Monitor labs      Stewart Alvarez, APRN  9/1/2020  11:04

## 2020-09-01 NOTE — NURSING NOTE
8u insulin given at 1026 for blood glucose of 369. At 1118 blood glucose was 361. Dr. Keyes notified. See new order.

## 2020-09-01 NOTE — PLAN OF CARE
Problem: Patient Care Overview  Goal: Plan of Care Review  Outcome: Ongoing (interventions implemented as appropriate)  Flowsheets (Taken 9/1/2020 6234)  Progress: no change  Plan of Care Reviewed With: patient  Note:   VSS. No changes in neuro status this shift. No c/o pain, abd pain, or n/v. No bm this shift. NPO since midnight for endo today. Sits up at bedside to void. Safety maintained.

## 2020-09-01 NOTE — ANESTHESIA POSTPROCEDURE EVALUATION
Patient: Erick Luong    Procedure Summary     Date:  09/01/20 Room / Location:  [facility] ENDOSCOPY 4 / [facility] ENDOSCOPY    Anesthesia Start:  1238 Anesthesia Stop:  1255    Procedure:  ESOPHAGOGASTRODUODENOSCOPY WITH ANESTHESIA (N/A ) Diagnosis:       Epigastric abdominal pain      (Epigastric abdominal pain [R10.13])    Surgeon:  Vicki Valle MD Provider:  Kim Mari CRNA    Anesthesia Type:  MAC ASA Status:  3 - Emergent          Anesthesia Type: MAC    Vitals  Vitals Value Taken Time   /67 9/1/2020  1:10 PM   Temp     Pulse 68 9/1/2020  1:11 PM   Resp 14 9/1/2020  1:10 PM   SpO2 96 % 9/1/2020  1:11 PM   Vitals shown include unvalidated device data.        Post Anesthesia Care and Evaluation    Patient location during evaluation: PHASE II  Patient participation: complete - patient participated  Level of consciousness: awake and alert  Pain management: adequate  Airway patency: patent  Anesthetic complications: No anesthetic complications  PONV Status: none  Cardiovascular status: acceptable  Respiratory status: acceptable  Hydration status: acceptable  No anesthesia care post op

## 2020-09-01 NOTE — TELEPHONE ENCOUNTER
He will need an office appt with Justyna in about a month or so to follow up on gastritis.  Should be going home in the next day or two.    Vicki Valle MD

## 2020-09-01 NOTE — PLAN OF CARE
Problem: Patient Care Overview  Goal: Plan of Care Review  Outcome: Ongoing (interventions implemented as appropriate)  Flowsheets (Taken 8/31/2020 1900)  Progress: improving  Plan of Care Reviewed With: patient; significant other  Outcome Summary: Pt is A&Ox4. VSS. Medicated with PRN flexeril for back pain. BLE edema present. Bumex given. Diabetic ulcers present on toes. Wound care. Lactulose given for hopeful BM. Endo scheduled for tomorrow. No neuro changes. 2u insulin given at dinner. IVF infusing. Safety maintained. Will cont to monitor.

## 2020-09-01 NOTE — PLAN OF CARE
Problem: Patient Care Overview  Goal: Plan of Care Review  Outcome: Ongoing (interventions implemented as appropriate)  Flowsheets (Taken 9/1/2020 6877)  Progress: no change  Plan of Care Reviewed With: patient; spouse  Outcome Summary: Pt is A&Ox4. VSS. Bumex given for BLE edema. Wound care completed on L foot. PRN lactulose given for hopeful BM. Voiding. IVF infusing. MD notified of blood glucose in 300s today. One time order of 10u insulin given. Safety maintained. No neuro changes. Will cont to monitor.

## 2020-09-02 VITALS
WEIGHT: 315 LBS | BODY MASS INDEX: 42.66 KG/M2 | OXYGEN SATURATION: 98 % | TEMPERATURE: 98.1 F | DIASTOLIC BLOOD PRESSURE: 70 MMHG | HEIGHT: 72 IN | SYSTOLIC BLOOD PRESSURE: 158 MMHG | HEART RATE: 80 BPM | RESPIRATION RATE: 16 BRPM

## 2020-09-02 LAB
ANION GAP SERPL CALCULATED.3IONS-SCNC: 12 MMOL/L (ref 5–15)
BUN SERPL-MCNC: 37 MG/DL (ref 8–23)
BUN/CREAT SERPL: 20.4 (ref 7–25)
CALCIUM SPEC-SCNC: 8.7 MG/DL (ref 8.6–10.5)
CHLORIDE SERPL-SCNC: 101 MMOL/L (ref 98–107)
CO2 SERPL-SCNC: 26 MMOL/L (ref 22–29)
CREAT SERPL-MCNC: 1.81 MG/DL (ref 0.76–1.27)
CYTO UR: NORMAL
GFR SERPL CREATININE-BSD FRML MDRD: 38 ML/MIN/1.73
GLUCOSE BLDC GLUCOMTR-MCNC: 353 MG/DL (ref 70–130)
GLUCOSE BLDC GLUCOMTR-MCNC: 382 MG/DL (ref 70–130)
GLUCOSE SERPL-MCNC: 359 MG/DL (ref 65–99)
HCT VFR BLD AUTO: 28.6 % (ref 37.5–51)
HGB BLD-MCNC: 9.4 G/DL (ref 13–17.7)
LAB AP CASE REPORT: NORMAL
PATH REPORT.FINAL DX SPEC: NORMAL
PATH REPORT.GROSS SPEC: NORMAL
POTASSIUM SERPL-SCNC: 4.8 MMOL/L (ref 3.5–5.2)
SODIUM SERPL-SCNC: 139 MMOL/L (ref 136–145)

## 2020-09-02 PROCEDURE — 82962 GLUCOSE BLOOD TEST: CPT

## 2020-09-02 PROCEDURE — 80048 BASIC METABOLIC PNL TOTAL CA: CPT | Performed by: FAMILY MEDICINE

## 2020-09-02 PROCEDURE — G0378 HOSPITAL OBSERVATION PER HR: HCPCS

## 2020-09-02 PROCEDURE — 85018 HEMOGLOBIN: CPT | Performed by: FAMILY MEDICINE

## 2020-09-02 PROCEDURE — 63710000001 INSULIN LISPRO (HUMAN) PER 5 UNITS: Performed by: NURSE PRACTITIONER

## 2020-09-02 PROCEDURE — 85014 HEMATOCRIT: CPT | Performed by: FAMILY MEDICINE

## 2020-09-02 RX ORDER — LEVOFLOXACIN 250 MG/1
250 TABLET ORAL DAILY
Qty: 5 TABLET | Refills: 0 | Status: SHIPPED | OUTPATIENT
Start: 2020-09-02 | End: 2020-09-07

## 2020-09-02 RX ORDER — BUMETANIDE 1 MG/1
1 TABLET ORAL DAILY
Qty: 30 TABLET | Refills: 0 | Status: ON HOLD | OUTPATIENT
Start: 2020-09-02 | End: 2021-05-10

## 2020-09-02 RX ORDER — BUMETANIDE 1 MG/1
1 TABLET ORAL DAILY
Status: DISCONTINUED | OUTPATIENT
Start: 2020-09-02 | End: 2020-09-02 | Stop reason: HOSPADM

## 2020-09-02 RX ORDER — ACETAMINOPHEN 325 MG/1
650 TABLET ORAL EVERY 6 HOURS PRN
Status: DISCONTINUED | OUTPATIENT
Start: 2020-09-02 | End: 2020-09-02 | Stop reason: HOSPADM

## 2020-09-02 RX ORDER — SUCRALFATE 1 G/1
2 TABLET ORAL
Qty: 120 TABLET | Refills: 0 | Status: SHIPPED | OUTPATIENT
Start: 2020-09-02 | End: 2020-10-02

## 2020-09-02 RX ADMIN — ACETAMINOPHEN 650 MG: 325 TABLET, FILM COATED ORAL at 01:39

## 2020-09-02 RX ADMIN — DULOXETINE HYDROCHLORIDE 60 MG: 30 CAPSULE, DELAYED RELEASE ORAL at 08:36

## 2020-09-02 RX ADMIN — TAMSULOSIN HYDROCHLORIDE 0.4 MG: 0.4 CAPSULE ORAL at 08:36

## 2020-09-02 RX ADMIN — BUMETANIDE 1 MG: 1 TABLET ORAL at 11:42

## 2020-09-02 RX ADMIN — GABAPENTIN 100 MG: 100 CAPSULE ORAL at 08:36

## 2020-09-02 RX ADMIN — PANTOPRAZOLE SODIUM 40 MG: 40 TABLET, DELAYED RELEASE ORAL at 08:36

## 2020-09-02 RX ADMIN — OXYCODONE HYDROCHLORIDE AND ACETAMINOPHEN 1 TABLET: 10; 325 TABLET ORAL at 08:36

## 2020-09-02 RX ADMIN — INSULIN LISPRO 12 UNITS: 100 INJECTION, SOLUTION INTRAVENOUS; SUBCUTANEOUS at 11:42

## 2020-09-02 RX ADMIN — INSULIN LISPRO 12 UNITS: 100 INJECTION, SOLUTION INTRAVENOUS; SUBCUTANEOUS at 08:36

## 2020-09-02 RX ADMIN — SODIUM CHLORIDE, PRESERVATIVE FREE 3 ML: 5 INJECTION INTRAVENOUS at 08:37

## 2020-09-02 RX ADMIN — SUCRALFATE 2 G: 1 TABLET ORAL at 06:32

## 2020-09-02 NOTE — PROGRESS NOTES
"Nephrology ([facility]) Progress Note      Patient:  Erick Luong  YOB: 1955  Date of Service: 9/2/2020  MRN: [id number]   Acct: [id number]   Primary Care Physician: Del Shetty MD  Advance Directive:   Code Status and Medical Interventions:   Ordered at: 08/31/20 0320     Level Of Support Discussed With:    Patient     Code Status:    CPR     Medical Interventions (Level of Support Prior to Arrest):    Full     Admit Date: 8/30/2020       Hospital Day: 0  Referring Provider: Abebe Garzon DO      Patient personally seen and examined.  Complete chart including Consults, Notes, Operative Reports, Labs, Cardiology, and Radiology studies reviewed as able.        Subjective:  Erick Luong is a 65 y.o. male  whom we were consulted for acute kidney injury.  Baseline chronic kidney disease stage 4.  Has not been seen in our office since March 2019. Review of chart indicates baseline creatinine was approximately 2.  History of poorly controlled type 2 diabetes, hypertension with chronic orthostatic hypotension, congestive heart failure, CAD with prior CABG.  Presented to emergency department with complaint of \"not feeling well\".  Has had increasing lower extremity edema for several days, dyspnea with exertion.  Burning midsternal chest pain not associated with movement or PO intake. He stated it feels different than his prior MI. Has also been having melenotic stools and a few episodes of emesis over the last few days. Denied recent changes in urination, no dark or foamy urine. Denied dysuria or hematuria. Denied NSAIDs. Moved to medical floor on 8/31.  Good diuresis with IV Bumex. EGD on 9/01    Today feeling better, dyspnea and edema improving. Urine output nonoliguric    Allergies:  Bactrim [sulfamethoxazole-trimethoprim]; Vancomycin; Clindamycin; Zolpidem tartrate; and Metronidazole    Home Meds:  Medications Prior to Admission   Medication Sig Dispense Refill Last " Dose   • aspirin 81 MG EC tablet Take 81 mg by mouth Daily.   Past Week at Unknown time   • bumetanide (BUMEX) 2 MG tablet Take 1 tablet by mouth 2 (Two) Times a Day As Needed (edema). 30 tablet 2 Past Week at Unknown time   • cloNIDine (CATAPRES) 0.1 MG tablet Take 0.1 mg by mouth 2 (Two) Times a Day As Needed for High Blood Pressure (SBP >165 or DBP >95).   Past Week at Unknown time   • cyclobenzaprine (FLEXERIL) 5 MG tablet Take 5 mg by mouth 2 (Two) Times a Day As Needed for Muscle Spasms.   Past Week at Unknown time   • diclofenac (VOLTAREN) 1 % gel gel Apply 4 g topically to the appropriate area as directed 4 (Four) Times a Day.   Past Week at Unknown time   • DULoxetine (CYMBALTA) 60 MG capsule Take 60 mg by mouth Daily.   Past Week at Unknown time   • gabapentin (NEURONTIN) 300 MG capsule Take 300 mg by mouth 3 (Three) Times a Day.   Past Week at Unknown time   • insulin glargine (LANTUS) 100 UNIT/ML injection Inject 20 Units under the skin into the appropriate area as directed Every Night.   Past Week at Unknown time   • insulin regular (humuLIN R) 500 UNIT/ML CONCENTRATED injection Inject 100 Units under the skin 3 (Three) Times a Day Before Meals. Packaging states 100 units with regular meals; 120 units with large meals or 360 units daily   Past Week at Unknown time   • latanoprost (XALATAN) 0.005 % ophthalmic solution Administer 1 drop to both eyes Every Night.   Past Week at Unknown time   • oxyCODONE-acetaminophen (PERCOCET)  MG per tablet Take 1 tablet by mouth 2 (Two) Times a Day.   Past Week at Unknown time   • pantoprazole (PROTONIX) 40 MG EC tablet Take 1 tablet by mouth Daily. 90 tablet 3 Past Week at Unknown time   • rosuvastatin (CRESTOR) 40 MG tablet Take 40 mg by mouth Every Night.   Past Week at Unknown time   • tamsulosin (FLOMAX) 0.4 MG capsule 24 hr capsule Take 1 capsule by mouth Daily. 30 capsule 11 Past Week at Unknown time   • traZODone (DESYREL) 100 MG tablet Take 100 mg by  mouth At Night As Needed for Sleep.   Past Week at Unknown time   • lactulose (CHRONULAC) 10 GM/15ML solution Take 20 g by mouth 3 (Three) Times a Day As Needed (constipation).   Taking       Medicines:  Current Facility-Administered Medications   Medication Dose Route Frequency Provider Last Rate Last Dose   • acetaminophen (TYLENOL) tablet 650 mg  650 mg Oral Q6H PRN Ian Ann MD   650 mg at 09/02/20 0139   • aspirin EC tablet 81 mg  81 mg Oral Daily Payam Keyes DO   81 mg at 08/31/20 0941   • bumetanide (BUMEX) tablet 1 mg  1 mg Oral Daily Payam Keyes DO       • cloNIDine (CATAPRES) tablet 0.1 mg  0.1 mg Oral BID PRN Payam Keyes DO       • cyclobenzaprine (FLEXERIL) tablet 5 mg  5 mg Oral BID PRN Payam Keyes DO   5 mg at 09/01/20 2254   • dextrose (D50W) 25 g/ 50mL Intravenous Solution 25 g  25 g Intravenous Q15 Min PRN Payam Keyes DO       • dextrose (GLUTOSE) oral gel 15 g  15 g Oral Q15 Min PRN Payam Keyes DO       • diclofenac (VOLTAREN) 1 % gel 2 g  2 g Topical BID Payam Keyes DO   2 g at 09/01/20 2106   • DULoxetine (CYMBALTA) DR capsule 60 mg  60 mg Oral Daily Payam Keyes DO   60 mg at 09/02/20 0836   • enoxaparin (LOVENOX) syringe 40 mg  40 mg Subcutaneous Q12H Payam Keyes DO   40 mg at 09/01/20 2105   • gabapentin (NEURONTIN) capsule 100 mg  100 mg Oral BID Payam Keyes DO   100 mg at 09/02/20 0836   • glucagon (human recombinant) (GLUCAGEN DIAGNOSTIC) injection 1 mg  1 mg Subcutaneous Q15 Min PRN Payam Keyes DO       • insulin detemir (LEVEMIR) injection 20 Units  20 Units Subcutaneous Nightly Payam Keyes DO   20 Units at 09/01/20 2112   • insulin lispro (humaLOG) injection 0-14 Units  0-14 Units Subcutaneous TID Lexie Peña APRN   12 Units at 09/02/20 0836   • lactulose solution 20 g  20 g Oral TID PRN Payam Keyes DO   20 g at 09/01/20 1525   • latanoprost (XALATAN) 0.005 %  ophthalmic solution 1 drop  1 drop Both Eyes Nightly Payam Keyes DO   1 drop at 09/01/20 2106   • levoFLOXacin (LEVAQUIN) 250 mg/50 mL D5W (premix) 250 mg  250 mg Intravenous Q24H Payam Keyes DO   Stopped at 09/01/20 1900   • oxyCODONE-acetaminophen (PERCOCET)  MG per tablet 1 tablet  1 tablet Oral BID Payam Keyes DO   1 tablet at 09/02/20 0836   • pantoprazole (PROTONIX) EC tablet 40 mg  40 mg Oral Daily Payam Keyes DO   40 mg at 09/02/20 0836   • rosuvastatin (CRESTOR) tablet 40 mg  40 mg Oral Nightly Payam Keyes DO   40 mg at 09/01/20 2105   • sodium chloride 0.9 % flush 10 mL  10 mL Intravenous PRN Payam Keyes DO       • sodium chloride 0.9 % flush 10 mL  10 mL Intravenous PRN Payam Keyes DO       • sodium chloride 0.9 % flush 10 mL  10 mL Intravenous Once PRN Payam Keyes DO       • sodium chloride 0.9 % flush 3 mL  3 mL Intravenous Q12H Payam Keyes DO   3 mL at 09/02/20 0837   • sodium chloride 0.9 % infusion  10 mL/hr Intravenous Continuous PRN Payam Keyes DO       • sodium chloride 0.9 % infusion  30 mL/hr Intravenous Continuous Payam Keyes DO 30 mL/hr at 09/01/20 1026 30 mL/hr at 09/01/20 1026   • sodium chloride 0.9 % infusion  100 mL/hr Intravenous Continuous Lul Zaman CRNA 100 mL/hr at 09/01/20 1205 100 mL/hr at 09/01/20 1205   • sucralfate (CARAFATE) tablet 2 g  2 g Oral BID AC Payam Keyes DO   2 g at 09/02/20 0632   • tamsulosin (FLOMAX) 24 hr capsule 0.4 mg  0.4 mg Oral Daily Payam Keyes DO   0.4 mg at 09/02/20 0836   • traZODone (DESYREL) tablet 100 mg  100 mg Oral Nightly PRN Payam Keyes DO   100 mg at 09/01/20 5049       Past Medical History:  Past Medical History:   Diagnosis Date   • Arthritis    • CAD (coronary artery disease) 2/6/2017   • Coronary artery disease    • Diabetes mellitus (CMS/HCC)    • Gastroesophageal reflux disease 5/13/2019   • HTN  (hypertension), benign 5/3/2017   • Hyperlipidemia    • Hypertension    • Mixed hyperlipidemia 2/7/2017   • Multiple lung nodules 1/26/2020    5mm, 9 mm RLL identified 1/2020, not present 10/2019.   • Myocardial infarction (CMS/HCC)    • Osteomyelitis (CMS/HCC) 1/22/2020   • Osteomyelitis of fifth toe of right foot (CMS/HCC) 10/7/2019   • Pancreatitis    • Persistent insomnia 1/20/2020   • Renal disorder    • Sleep apnea 2/6/2017   • Sleep apnea with use of continuous positive airway pressure (CPAP)    • Slow transit constipation 1/16/2019       Past Surgical History:  Past Surgical History:   Procedure Laterality Date   • ABDOMINAL SURGERY     • APPENDECTOMY     • BACK SURGERY     • CARDIAC SURGERY     • CATARACT EXTRACTION     • CERVICAL SPINE SURGERY     • COLONOSCOPY N/A 1/31/2017    Normal exam repeat in 5 years   • COLONOSCOPY N/A 2/11/2019    Procedure: COLONOSCOPY WITH ANESTHESIA;  Surgeon: Tyrell Smith MD;  Location: [facility] ENDOSCOPY;  Service: Gastroenterology   • COLONOSCOPY W/ POLYPECTOMY  03/04/2014    Hyperplastic polyp   • CORONARY ARTERY BYPASS GRAFT  10/2015   • ENDOSCOPY  04/13/2011    Gastritis with hemorrhage   • ENDOSCOPY N/A 5/5/2017    Normal exam   • ENDOSCOPY N/A 2/11/2019    Procedure: ESOPHAGOGASTRODUODENOSCOPY WITH ANESTHESIA;  Surgeon: Tyrell Smith MD;  Location: [facility] ENDOSCOPY;  Service: Gastroenterology   • ENDOSCOPY N/A 9/1/2020    Procedure: ESOPHAGOGASTRODUODENOSCOPY WITH ANESTHESIA;  Surgeon: Vicki Valle MD;  Location: [facility] ENDOSCOPY;  Service: Gastroenterology;  Laterality: N/A;  pre op: abdominal pain  post op:gastritis  PCP: Del Shetty MD   • INCISION AND DRAINAGE OF WOUND Left 09/2007    spider bite       Family History  Family History   Problem Relation Age of Onset   • Colon cancer Father    • Heart disease Father    • Colon cancer Sister    • Colon polyps Sister    • Alzheimer's disease Mother    • Coronary artery disease Sister    • Coronary  artery disease Sister        Social History  Social History     Socioeconomic History   • Marital status:      Spouse name: Not on file   • Number of children: Not on file   • Years of education: Not on file   • Highest education level: Not on file   Tobacco Use   • Smoking status: Never Smoker   • Smokeless tobacco: Never Used   • Tobacco comment: smoked in highschool   Substance and Sexual Activity   • Alcohol use: No   • Drug use: No   • Sexual activity: Defer         Review of Systems:  History obtained from chart review and the patient  General ROS: No fever or chills  Respiratory ROS: No cough, shortness of breath, wheezing  Cardiovascular ROS: +edema, dyspnea on exertion.  No chest pain or palpitations  Gastrointestinal ROS: No abdominal pain or melena  Genito-Urinary ROS: No dysuria or hematuria  Neurological ROS; no headache or dizziness    Objective:  Patient Vitals for the past 24 hrs:   BP Temp Temp src Pulse Resp SpO2   09/02/20 0742 136/55 98.5 °F (36.9 °C) Oral 75 16 98 %   09/02/20 0411 132/68 97.9 °F (36.6 °C) Oral 74 16 98 %   09/02/20 0034 111/63 98.3 °F (36.8 °C) Oral 80 16 96 %   09/01/20 1929 152/67 97.8 °F (36.6 °C) Oral 88 16 99 %   09/01/20 1641 132/72 97.5 °F (36.4 °C) Oral 63 16 99 %   09/01/20 1430 128/70 97.9 °F (36.6 °C) Oral 67 16 99 %   09/01/20 1400 160/69 97.8 °F (36.6 °C) Oral 66 16 99 %   09/01/20 1325 133/74 -- -- 69 13 --   09/01/20 1320 128/62 -- -- 68 13 98 %   09/01/20 1315 118/66 -- -- 65 16 97 %   09/01/20 1310 101/67 -- -- 66 14 95 %   09/01/20 1305 120/75 -- -- 66 15 93 %   09/01/20 1300 111/66 -- -- 64 13 95 %   09/01/20 1255 106/65 -- -- 65 15 95 %       Intake/Output Summary (Last 24 hours) at 9/2/2020 1055  Last data filed at 9/2/2020 0908  Gross per 24 hour   Intake 120 ml   Output 3850 ml   Net -3730 ml     General: awake/alert    Neck: supple, no JVD  Chest:  clear to auscultation bilaterally without respiratory distress  CVS: regular rate and  rhythm  Abdominal: obese and soft, nontender, positive bowel sounds  Extremities: 1+ lower extremity edema  Skin: warm and dry without rash  Neuro: no focal motor deficits    Labs:  Results from last 7 days   Lab Units 09/02/20  0814 09/01/20  1923 09/01/20  0826 08/30/20  2246   WBC 10*3/mm3  --   --   --   --  9.81   HEMOGLOBIN g/dL 9.4* 10.2* 9.5*   < > 9.0*   HEMATOCRIT % 28.6* 31.4* 28.2*   < > 26.3*   PLATELETS 10*3/mm3  --   --   --   --  230    < > = values in this interval not displayed.         Results from last 7 days   Lab Units 09/02/20  0814 09/01/20 0826 08/31/20  0800 08/30/20  2246   SODIUM mmol/L 139 135* 140   < > 129*   POTASSIUM mmol/L 4.8 4.7 3.8   < > 4.5   CHLORIDE mmol/L 101 96* 100   < > 89*   CO2 mmol/L 26.0 25.0 27.0   < > 23.0   BUN mg/dL 37* 47* 59*   < > 56*   CREATININE mg/dL 1.81* 2.10* 2.52*   < > 2.68*   CALCIUM mg/dL 8.7 8.8 9.2   < > 8.8   BILIRUBIN mg/dL  --   --   --   --  0.4   ALK PHOS U/L  --   --   --   --  86   ALT (SGPT) U/L  --   --   --   --  12   AST (SGOT) U/L  --   --   --   --  14   GLUCOSE mg/dL 359* 389* 141*   < > 733*    < > = values in this interval not displayed.       Radiology:   Imaging Results (Last 72 Hours)     Procedure Component Value Units Date/Time    SCANNED - IMAGING [[id number]] Resulted:  08/30/20      Updated:  08/31/20 1458    CT Chest Without Contrast [[id number]] Collected:  08/31/20 0755     Updated:  08/31/20 0803    Narrative:       EXAMINATION: CT CHEST WO CONTRAST- 8/31/2020 7:55 AM CDT     HISTORY: Chest pain, shortness of breath     COMPARISON: CT chest without contrast 5/15/2020, CT chest without  contrast 1/18/2018     DOSE: 444 mGy-cm     TECHNIQUE: Sequential imaging was performed from the thoracic inlet  through the upper abdomen without the use of IV contrast.  Sagittal and  coronal reformations were made from the original source data and  reviewed. Automated exposure control was also utilized to decrease  patient radiation  dose.     FINDINGS:   The visualized thyroid gland is grossly normal in appearance. The  trachea and main bronchi appear widely patent and in normal anatomic  position. The esophagus is grossly normal in appearance.     No pathologically enlarged axillary, mediastinal, or hilar lymph nodes  are identified.     The heart appears normal in size. There has been prior CABG. Coronary  artery calcifications are present. Atherosclerotic calcifications are  also seen within the aorta and its branch vessels. The ascending  thoracic aorta and main pulmonary artery appear normal in caliber. There  is no pericardial or pleural effusion.     A noncalcified nodule is seen in the medial right lower lobe measuring 9  mm in size, stable from multiple prior exams dating to January 2018.  Mild bronchial wall thickening is noted. The lungs otherwise appear  clear.     Please see the separate CT abdomen and pelvis report from the same day  for findings in the upper abdomen.     Review of the visualized osseous structures demonstrates no acute or  aggressive lesions. Degenerative osteophyte formation is noted in the  spine. There has been prior median sternotomy.        Impression:          1. No acute findings in the chest.     2. Atherosclerosis of the aorta and coronary arteries, status post CABG.     3. Mild bronchial wall thickening.     A preliminary report was provided by stat rad.     This report was finalized on 08/31/2020 08:00 by Dr. Justen Figueroa MD.    CT Abdomen Pelvis Without Contrast [[id number]] Collected:  08/31/20 0741     Updated:  08/31/20 0749    Narrative:       EXAMINATION: CT ABDOMEN PELVIS WO CONTRAST- 8/31/2020 7:41 AM CDT     HISTORY: Abdominal pain     COMPARISON: CT abdomen and pelvis without contrast 1/21/2020     DOSE: 1874 mGy-cm     TECHNIQUE: Sequential imaging was performed from the lung bases through  the pubic symphysis without the use of IV contrast.  Sagittal and  coronal reformations were made  from the original source data and  reviewed. Automated exposure control was also utilized to decrease  patient radiation dose.     FINDINGS:   Please see the separate CT chest report from same day for findings in  the lung bases. A noncalcified nodule is noted in the medial right lower  lobe, stable.     Evaluation is limited due to the lack of IV contrast. The gallbladder is  surgically absent. The liver, spleen, and adrenal glands are  unremarkable. There is diffuse fatty atrophy of the pancreas.  Low-density lesions are noted in the kidneys bilaterally, incompletely  characterized on this exam but stable compared to the prior exam. There  is nonspecific bilateral perinephric stranding.     The abdominal aorta appears normal in caliber. There are scattered  atherosclerotic calcifications of the aorta and its branch vessels.     No pathologically enlarged central mesenteric or retroperitoneal lymph  nodes are identified.     The stomach and small bowel appear normal in caliber. A large amount  stool is noted throughout the colon, suggesting fecal stasis. Large  bowel is otherwise grossly normal in appearance. No free air is seen in  the abdomen.     Urinary bladder is grossly normal in appearance. No free fluid is seen  in the pelvis. No pelvic or inguinal lymphadenopathy is identified.  There is a fat-containing right inguinal hernia. No free fluid is seen  in the pelvis. There is some mild stranding of the inferior ventral  abdominal wall.     Review of the visualized osseous structures demonstrates no acute or  aggressive lesions. Degenerative osteophyte formation is noted in the  spine. Degenerative changes are also noted in the SI joints. There has  been prior median sternotomy.       Impression:       1. Fecal stasis.     2. Otherwise, no acute findings in the abdomen or pelvis.     3. Atherosclerotic disease.  4. Nonspecific bilateral perinephric stranding, which is often seen with  chronic kidney  disease.  5. Mild soft tissue stranding of the ventral abdominal wall inferiorly.     A preliminary report was provided by stat [facility].     This report was finalized on 08/31/2020 07:46 by Dr. Justen Figueroa MD.    XR Chest 1 View [[id number]] Collected:  08/31/20 0649     Updated:  08/31/20 0652    Narrative:       Frontal upright radiograph of the chest 8/30/2020 11:17 PM CDT     COMPARISON: February 14, 2020     HISTORY: Chest pain.     FINDINGS:   The lungs are clear. The cardiac silhouette is normal. Wires are present  from previous median sternotomy and CABG.      The osseous structures and surrounding soft tissues demonstrate no acute  abnormality.       Impression:       1. No radiographic evidence of acute cardiopulmonary process.        This report was finalized on 08/31/2020 06:49 by Dr. Eddie Winter MD.          Culture:  No results found for: BLOODCX, URINECX, WOUNDCX, MRSACX, RESPCX, STOOLCX      Assessment   1.  Acute kidney injury--resolving  2.  Baseline chronic kidney disease stage 4  3.  Benign hypertension  4.  Poorly controlled type 2 diabetes  5.  Volume overload--improving  6.  Nonerosive gastritis with bleeding per EGD on 9/01  7.  Anemia of CKD and GI blood loss    Plan:  1.  Transition to PO Bumex  2.  Renal function improving  3.  Monitor labs      Stewart Alvarez, SUSAN  9/2/2020  10:55

## 2020-09-02 NOTE — PROGRESS NOTES
Discharge Planning Assessment  [facility]     Patient Name: Erick Luong  MRN: [id number]  Today's Date: 9/2/2020    Admit Date: 8/30/2020    Discharge Needs Assessment     Row Name 09/02/20 0817       Living Environment    Lives With  spouse    Name(s) of Who Lives With Patient  Joan spouse    Current Living Arrangements  home/apartment/condo    Primary Care Provided by  self    Provides Primary Care For  no one, unable/limited ability to care for self    Family Caregiver if Needed  spouse    Family Caregiver Names  Joan , spouse    Quality of Family Relationships  helpful;involved;supportive    Able to Return to Prior Arrangements  yes       Resource/Environmental Concerns    Resource/Environmental Concerns  none    Transportation Concerns  car, none       Transition Planning    Patient/Family Anticipates Transition to  home with family    Patient/Family Anticipated Services at Transition  none    Transportation Anticipated  family or friend will provide       Discharge Needs Assessment    Readmission Within the Last 30 Days  no previous admission in last 30 days    Concerns to be Addressed  no discharge needs identified    Equipment Currently Used at Home  none    Anticipated Changes Related to Illness  none    Equipment Needed After Discharge  none    Outpatient/Agency/Support Group Needs  adult     Discharge Coordination/Progress  Plan for dc home with spouse to assist with care and transportation. No needs expressed at this time. Will follow for dc needs per md orders. Patient has Pcp and Rx coverage.         Discharge Plan    No documentation.       Destination      Coordination has not been started for this encounter.      Durable Medical Equipment      Coordination has not been started for this encounter.      Dialysis/Infusion      Coordination has not been started for this encounter.      Home Medical Care      Coordination has not been started for this encounter.      Therapy       Coordination has not been started for this encounter.      Community Resources      Coordination has not been started for this encounter.          Demographic Summary    No documentation.       Functional Status    No documentation.       Psychosocial    No documentation.       Abuse/Neglect    No documentation.       Legal    No documentation.       Substance Abuse    No documentation.       Patient Forms    No documentation.           Martha Valenzuela RN

## 2020-09-02 NOTE — DISCHARGE SUMMARY
[facility]  DISCHARGE SUMMARY       Date of Admission: 8/30/2020  Date of Discharge:  9/2/2020  Primary Care Physician: Del Shetty MD    Presenting Problem/History of Present Illness:  Hyperosmolarity due to secondary diabetes mellitus (CMS/HCC) [E13.00]  Hyperosmolarity due to secondary diabetes mellitus (CMS/HCC) [E13.00]     Final Discharge Diagnoses:  Active Hospital Problems    Diagnosis   • **Acute kidney injury superimposed on chronic kidney disease (CMS/[facility])   • Hyperosmolarity due to secondary diabetes mellitus (CMS/[facility])   • Uncontrolled type 2 diabetes mellitus with hyperglycemia (CMS/[facility])   • Medically noncompliant   • Diabetic foot ulcer (CMS/[facility])   • CKD (chronic kidney disease) stage 4, GFR 15-29 ml/min (CMS/[facility])   • Sleep apnea with use of continuous positive airway pressure (CPAP)   • Anasarca   • Morbid obesity with BMI of 45.0-49.9, adult (CMS/[facility])   • Anemia due to chronic kidney disease   • HTN (hypertension), benign   • Type 2 diabetes mellitus with hyperglycemia, with long-term current use of insulin (CMS/[facility])       Consults: Dr. Arteaga with nephrology.  Dr. Valle with gastroenterology.    Procedures Performed:     Upper GI Endoscopy 9/1/2020      Pertinent Test Results:   Lab Results (last 72 hours)     Procedure Component Value Units Date/Time    POC Glucose Once [[id number]]  (Abnormal) Collected:  09/02/20 1129    Specimen:  Blood Updated:  09/02/20 1140     Glucose 382 mg/dL      Comment: : [id number] Irene SaenzMeter ID: [id number]       Basic Metabolic Panel [[id number]]  (Abnormal) Collected:  09/02/20 0814    Specimen:  Blood Updated:  09/02/20 0852     Glucose 359 mg/dL      BUN 37 mg/dL      Creatinine 1.81 mg/dL      Sodium 139 mmol/L      Potassium 4.8 mmol/L      Chloride 101 mmol/L      CO2 26.0 mmol/L      Calcium 8.7 mg/dL      eGFR Non African Amer 38 mL/min/1.73      BUN/Creatinine Ratio 20.4     Anion Gap  12.0 mmol/L     Narrative:       GFR Normal >60  Chronic Kidney Disease <60  Kidney Failure <15      Hemoglobin & Hematocrit, Blood [[id number]]  (Abnormal) Collected:  09/02/20 0814    Specimen:  Blood Updated:  09/02/20 0837     Hemoglobin 9.4 g/dL      Hematocrit 28.6 %     POC Glucose Once [[id number]]  (Abnormal) Collected:  09/02/20 0801    Specimen:  Blood Updated:  09/02/20 0812     Glucose 353 mg/dL      Comment: : [id number] Irene SaenzMeter ID: [id number]       POC Glucose Once [[id number]]  (Abnormal) Collected:  09/01/20 2110    Specimen:  Blood Updated:  09/01/20 2121     Glucose 344 mg/dL      Comment: : [id number] Akshat Em ID: [id number]       Vitamin D 25 Hydroxy [[id number]]  (Normal) Collected:  09/01/20 0826    Specimen:  Blood Updated:  09/01/20 2026     25 Hydroxy, Vitamin D 35.4 ng/ml     Narrative:       Reference Range for Total Vitamin D 25(OH)     Deficiency <20.0 ng/mL   Insufficiency 21-29 ng/mL   Sufficiency  ng/mL  Toxicity >100 ng/ml    Results may be falsely increased if patient taking Biotin.      Hemoglobin & Hematocrit, Blood [[id number]]  (Abnormal) Collected:  09/01/20 1923    Specimen:  Blood Updated:  09/01/20 1934     Hemoglobin 10.2 g/dL      Hematocrit 31.4 %     POC Glucose Once [[id number]]  (Abnormal) Collected:  09/01/20 1713    Specimen:  Blood Updated:  09/01/20 1753     Glucose 257 mg/dL      Comment: : [id number] Irene SaenzMeter ID: [id number]       POC Glucose Once [[id number]]  (Abnormal) Collected:  09/01/20 1407    Specimen:  Blood Updated:  09/01/20 1424     Glucose 321 mg/dL      Comment: : [id number] Irene SaenzMetdorcas ID: [id number]       POC Glucose Once [[id number]]  (Abnormal) Collected:  09/01/20 1118    Specimen:  Blood Updated:  09/01/20 1133     Glucose 361 mg/dL      Comment: : [id number] Bere Escobedo ID: [id number]       POC Glucose Once [[id number]]  (Abnormal) Collected:  09/01/20 1017    Specimen:  Blood  Updated:  09/01/20 1052     Glucose 369 mg/dL      Comment: : [id number] Irene Fallon ID: [id number]       Basic Metabolic Panel [[id number]]  (Abnormal) Collected:  09/01/20 0826    Specimen:  Blood Updated:  09/01/20 0905     Glucose 389 mg/dL      BUN 47 mg/dL      Creatinine 2.10 mg/dL      Sodium 135 mmol/L      Potassium 4.7 mmol/L      Chloride 96 mmol/L      CO2 25.0 mmol/L      Calcium 8.8 mg/dL      eGFR Non African Amer 32 mL/min/1.73      BUN/Creatinine Ratio 22.4     Anion Gap 14.0 mmol/L     Narrative:       GFR Normal >60  Chronic Kidney Disease <60  Kidney Failure <15      Magnesium [[id number]]  (Normal) Collected:  09/01/20 0826    Specimen:  Blood Updated:  09/01/20 0905     Magnesium 2.3 mg/dL     PTH, Intact [[id number]]  (Abnormal) Collected:  09/01/20 0826    Specimen:  Blood Updated:  09/01/20 0905     PTH, Intact 103.0 pg/mL     Narrative:       Results may be falsely decreased if patient taking Biotin.      Uric Acid [[id number]]  (Abnormal) Collected:  09/01/20 0826    Specimen:  Blood Updated:  09/01/20 0905     Uric Acid 9.2 mg/dL     Hemoglobin & Hematocrit, Blood [[id number]]  (Abnormal) Collected:  09/01/20 0826    Specimen:  Blood Updated:  09/01/20 0847     Hemoglobin 9.5 g/dL      Hematocrit 28.2 %     POC Glucose Once [[id number]]  (Abnormal) Collected:  08/31/20 2139    Specimen:  Blood Updated:  08/31/20 2200     Glucose 305 mg/dL      Comment: : [id number] Akshat Em ID: [id number]       Hemoglobin & Hematocrit, Blood [[id number]]  (Abnormal) Collected:  08/31/20 2039    Specimen:  Blood Updated:  08/31/20 2055     Hemoglobin 9.9 g/dL      Hematocrit 29.3 %     Urea Nitrogen, Urine - Urine, Clean Catch [[id number]] Collected:  08/30/20 2334    Specimen:  Urine, Clean Catch Updated:  08/31/20 1939     Urea Nitrogen, Urine 332 mg/dL     Narrative:       Reference intervals for random urine have not been established.  Clinical usage is dependent upon physician's  interpretation in combination with other laboratory tests.       Creatinine, Urine, Random - Urine, Clean Catch [[id number]] Collected:  08/30/20 2334    Specimen:  Urine, Clean Catch Updated:  08/31/20 1939     Creatinine, Urine 32.2 mg/dL     Narrative:       Reference intervals for random urine have not been established.  Clinical usage is dependent upon physician's interpretation in combination with other laboratory tests.       POC Glucose Once [[id number]]  (Abnormal) Collected:  08/31/20 1650    Specimen:  Blood Updated:  08/31/20 1701     Glucose 153 mg/dL      Comment: : [id number] Irene GarciaileyMeter ID: [id number]       POC Glucose Once [[id number]]  (Normal) Collected:  08/31/20 1152    Specimen:  Blood Updated:  08/31/20 1155     Glucose 122 mg/dL      Comment: : [id number] Noel MindyMeter ID: [id number]       Occult Blood X 1, Stool - Stool, Per Rectum [[id number]]  (Normal) Collected:  08/31/20 1010    Specimen:  Stool from Per Rectum Updated:  08/31/20 1032     Fecal Occult Blood Negative    Sodium, Urine, Random - Urine, Clean Catch [[id number]] Collected:  08/30/20 2334    Specimen:  Urine, Clean Catch Updated:  08/31/20 0954     Sodium, Urine 55 mmol/L     Narrative:       Reference intervals for random urine have not been established.  Clinical usage is dependent upon physician's interpretation in combination with other laboratory tests.       Protein, Urine, Random - Urine, Clean Catch [[id number]] Collected:  08/30/20 2334    Specimen:  Urine, Clean Catch Updated:  08/31/20 0953     Total Protein, Urine 6.9 mg/dL     Narrative:       Reference intervals for random urine have not been established.  Clinical usage is dependent upon physician's interpretation in combination with other laboratory tests.       Basic Metabolic Panel [[id number]]  (Abnormal) Collected:  08/31/20 0800    Specimen:  Blood Updated:  08/31/20 0843     Glucose 141 mg/dL      BUN 59 mg/dL      Creatinine 2.52 mg/dL       Sodium 140 mmol/L      Potassium 3.8 mmol/L      Chloride 100 mmol/L      CO2 27.0 mmol/L      Calcium 9.2 mg/dL      eGFR Non African Amer 26 mL/min/1.73      BUN/Creatinine Ratio 23.4     Anion Gap 13.0 mmol/L     Narrative:       GFR Normal >60  Chronic Kidney Disease <60  Kidney Failure <15      Magnesium [[id number]]  (Abnormal) Collected:  08/31/20 0800    Specimen:  Blood Updated:  08/31/20 0843     Magnesium 2.5 mg/dL     Phosphorus [[id number]]  (Normal) Collected:  08/31/20 0800    Specimen:  Blood Updated:  08/31/20 0843     Phosphorus 4.4 mg/dL     POC Glucose Once [[id number]]  (Abnormal) Collected:  08/31/20 0743    Specimen:  Blood Updated:  08/31/20 0755     Glucose 131 mg/dL      Comment: : [id number] Yesika Bernabe ID: [id number]       Troponin [[id number]]  (Normal) Collected:  08/31/20 0631    Specimen:  Blood Updated:  08/31/20 0718     Troponin T 0.018 ng/mL     Narrative:       Troponin T Reference Range:  <= 0.03 ng/mL-   Negative for AMI  >0.03 ng/mL-     Abnormal for myocardial necrosis.  Clinicians would have to utilize clinical acumen, EKG, Troponin and serial changes to determine if it is an Acute Myocardial Infarction or myocardial injury due to an underlying chronic condition.       Results may be falsely decreased if patient taking Biotin.      Troponin [[id number]]  (Normal) Collected:  08/31/20 0333    Specimen:  Blood Updated:  08/31/20 0709     Troponin T 0.018 ng/mL     Narrative:       Troponin T Reference Range:  <= 0.03 ng/mL-   Negative for AMI  >0.03 ng/mL-     Abnormal for myocardial necrosis.  Clinicians would have to utilize clinical acumen, EKG, Troponin and serial changes to determine if it is an Acute Myocardial Infarction or myocardial injury due to an underlying chronic condition.       Results may be falsely decreased if patient taking Biotin.      Osmolality, Serum [[id number]]  (Abnormal) Collected:  08/31/20 0333    Specimen:  Blood Updated:  08/31/20 0521      Osmolality 312 mOsm/kg     Basic Metabolic Panel [[id number]]  (Abnormal) Collected:  08/31/20 0333    Specimen:  Blood Updated:  08/31/20 0415     Glucose 307 mg/dL      BUN 58 mg/dL      Creatinine 2.70 mg/dL      Sodium 137 mmol/L      Potassium 4.2 mmol/L      Chloride 96 mmol/L      CO2 25.0 mmol/L      Calcium 9.3 mg/dL      eGFR Non African Amer 24 mL/min/1.73      BUN/Creatinine Ratio 21.5     Anion Gap 16.0 mmol/L     Narrative:       GFR Normal >60  Chronic Kidney Disease <60  Kidney Failure <15      Basic Metabolic Panel [[id number]]  (Abnormal) Collected:  08/31/20 0333    Specimen:  Blood Updated:  08/31/20 0414     Glucose 299 mg/dL      BUN 58 mg/dL      Creatinine 2.56 mg/dL      Sodium 137 mmol/L      Potassium 4.0 mmol/L      Chloride 97 mmol/L      CO2 25.0 mmol/L      Calcium 9.2 mg/dL      eGFR Non African Amer 25 mL/min/1.73      BUN/Creatinine Ratio 22.7     Anion Gap 15.0 mmol/L     Narrative:       GFR Normal >60  Chronic Kidney Disease <60  Kidney Failure <15      Magnesium [[id number]]  (Abnormal) Collected:  08/31/20 0333    Specimen:  Blood Updated:  08/31/20 0414     Magnesium 2.5 mg/dL     Phosphorus [[id number]]  (Normal) Collected:  08/31/20 0333    Specimen:  Blood Updated:  08/31/20 0414     Phosphorus 4.0 mg/dL     POC Glucose Once [[id number]]  (Abnormal) Collected:  08/31/20 0347    Specimen:  Blood Updated:  08/31/20 0407     Glucose 272 mg/dL      Comment: : [id number] [facility] HeatherM[facility] ID: [id number]       Hemoglobin A1c [[id number]]  (Abnormal) Collected:  08/31/20 0333    Specimen:  Blood Updated:  08/31/20 0404     Hemoglobin A1C 10.90 %     Narrative:       Hemoglobin A1C Ranges:    Increased Risk for Diabetes  5.7% to 6.4%  Diabetes                     >= 6.5%  Diabetic Goal                < 7.0%    Phosphorus [[id number]]  (Normal) Collected:  08/31/20 0031    Specimen:  Blood Updated:  08/31/20 0400     Phosphorus 3.6 mg/dL     BNP [[id number]]  (Abnormal)  Collected:  08/31/20 0031    Specimen:  Blood Updated:  08/31/20 0359     proBNP 1,321.0 pg/mL     Narrative:       Among patients with dyspnea, NT-proBNP is highly sensitive for the detection of acute congestive heart failure. In addition NT-proBNP of <300 pg/ml effectively rules out acute congestive heart failure with 99% negative predictive value.    Results may be falsely decreased if patient taking Biotin.      POC Glucose Once [[id number]]  (Abnormal) Collected:  08/31/20 0108    Specimen:  Blood Updated:  08/31/20 0119     Glucose 525 mg/dL      Comment: : [id number] Jordan MorganMeter ID: [id number]       Troponin [[id number]]  (Normal) Collected:  08/31/20 0031    Specimen:  Blood Updated:  08/31/20 0054     Troponin T 0.019 ng/mL     Narrative:       Troponin T Reference Range:  <= 0.03 ng/mL-   Negative for AMI  >0.03 ng/mL-     Abnormal for myocardial necrosis.  Clinicians would have to utilize clinical acumen, EKG, Troponin and serial changes to determine if it is an Acute Myocardial Infarction or myocardial injury due to an underlying chronic condition.       Results may be falsely decreased if patient taking Biotin.      COVID PRE-OP / PRE-PROCEDURE SCREENING ORDER (NO ISOLATION) - Swab, Nasal Cavity [[id number]] Collected:  08/31/20 0003    Specimen:  Swab from Nasal Cavity Updated:  08/31/20 0047    Narrative:       The following orders were created for panel order COVID PRE-OP / PRE-PROCEDURE SCREENING ORDER (NO ISOLATION) - Swab, Nasal Cavity.  Procedure                               Abnormality         Status                     ---------                               -----------         ------                     COVID-19,[facility]...[[id number]]  Normal              Final result                 Please view results for these tests on the individual orders.    COVID-19,[facility] IN-HOUSE,Nasal Swab No Transport Media 3-4 HR TAT - Swab, Nasal Cavity [[id number]]  (Normal) Collected:  08/31/20  0003    Specimen:  Swab from Nasal Cavity Updated:  08/31/20 0047     COVID19 Not Detected    Narrative:       Fact sheet for providers: https://www.fda.gov/media/645572/download     Fact sheet for patients: https://www.fda.gov/media/803425/download    [place] Draw [[id number]] Collected:  08/30/20 2246    Specimen:  Blood Updated:  08/31/20 0000    Narrative:       The following orders were created for panel order [place] Draw.  Procedure                               Abnormality         Status                     ---------                               -----------         ------                     Light Blue Top[[id number]]                                   Final result               Green Top (Gel)[[id number]]                                  Final result               Lavender Top[[id number]]                                     Final result               Red Top[[id number]]                                          Final result                 Please view results for these tests on the individual orders.    Light Blue Top [[id number]] Collected:  08/30/20 2246    Specimen:  Blood Updated:  08/31/20 0000     Extra Tube hold for add-on     Comment: Auto resulted       Green Top (Gel) [[id number]] Collected:  08/30/20 2246    Specimen:  Blood Updated:  08/31/20 0000     Extra Tube Hold for add-ons.     Comment: Auto resulted.       Lavender Top [[id number]] Collected:  08/30/20 2246    Specimen:  Blood Updated:  08/31/20 0000     Extra Tube hold for add-on     Comment: Auto resulted       Red Top [[id number]] Collected:  08/30/20 2246    Specimen:  Blood Updated:  08/31/20 0000     Extra Tube Hold for add-ons.     Comment: Auto resulted.       Urinalysis With Microscopic If Indicated (No Culture) - Urine, Clean Catch [[id number]]  (Abnormal) Collected:  08/30/20 2334    Specimen:  Urine, Clean Catch Updated:  08/31/20 0000     Color, UA Yellow     Appearance, UA Clear     pH, UA 7.0     Specific [place], UA 1.022      Glucose, UA >=1000 mg/dL (3+)     Ketones, UA Negative     Bilirubin, UA Negative     Blood, UA Negative     Protein, UA Negative     Leuk Esterase, UA Negative     Nitrite, UA Negative     Urobilinogen, UA 0.2 E.U./dL    Narrative:       Urine microscopic not indicated.    Comprehensive Metabolic Panel [[id number]]  (Abnormal) Collected:  08/30/20 2246    Specimen:  Blood Updated:  08/30/20 2318     Glucose 733 mg/dL      BUN 56 mg/dL      Creatinine 2.68 mg/dL      Sodium 129 mmol/L      Potassium 4.5 mmol/L      Chloride 89 mmol/L      CO2 23.0 mmol/L      Calcium 8.8 mg/dL      Total Protein 6.4 g/dL      Albumin 4.10 g/dL      ALT (SGPT) 12 U/L      AST (SGOT) 14 U/L      Alkaline Phosphatase 86 U/L      Total Bilirubin 0.4 mg/dL      eGFR Non African Amer 24 mL/min/1.73      Globulin 2.3 gm/dL      A/G Ratio 1.8 g/dL      BUN/Creatinine Ratio 20.9     Anion Gap 17.0 mmol/L     Narrative:       GFR Normal >60  Chronic Kidney Disease <60  Kidney Failure <15      Troponin [[id number]]  (Normal) Collected:  08/30/20 2246    Specimen:  Blood Updated:  08/30/20 2315     Troponin T 0.017 ng/mL     Narrative:       Troponin T Reference Range:  <= 0.03 ng/mL-   Negative for AMI  >0.03 ng/mL-     Abnormal for myocardial necrosis.  Clinicians would have to utilize clinical acumen, EKG, Troponin and serial changes to determine if it is an Acute Myocardial Infarction or myocardial injury due to an underlying chronic condition.       Results may be falsely decreased if patient taking Biotin.      CBC & Differential [[id number]] Collected:  08/30/20 2246    Specimen:  Blood Updated:  08/30/20 2253    Narrative:       The following orders were created for panel order CBC & Differential.  Procedure                               Abnormality         Status                     ---------                               -----------         ------                     CBC Auto Differential[[id number]]        Abnormal            Final  result                 Please view results for these tests on the individual orders.    CBC Auto Differential [[id number]]  (Abnormal) Collected:  08/30/20 2246    Specimen:  Blood Updated:  08/30/20 2253     WBC 9.81 10*3/mm3      RBC 3.03 10*6/mm3      Hemoglobin 9.0 g/dL      Hematocrit 26.3 %      MCV 86.8 fL      MCH 29.7 pg      MCHC 34.2 g/dL      RDW 13.4 %      RDW-SD 42.5 fl      MPV 11.1 fL      Platelets 230 10*3/mm3      Neutrophil % 74.6 %      Lymphocyte % 12.0 %      Monocyte % 8.6 %      Eosinophil % 3.9 %      Basophil % 0.5 %      Immature Grans % 0.4 %      Neutrophils, Absolute 7.32 10*3/mm3      Lymphocytes, Absolute 1.18 10*3/mm3      Monocytes, Absolute 0.84 10*3/mm3      Eosinophils, Absolute 0.38 10*3/mm3      Basophils, Absolute 0.05 10*3/mm3      Immature Grans, Absolute 0.04 10*3/mm3      nRBC 0.0 /100 WBC         Imaging Results (Last 72 Hours)     Procedure Component Value Units Date/Time    SCANNED - IMAGING [[id number]] Resulted:  08/30/20      Updated:  08/31/20 1458    CT Chest Without Contrast [[id number]] Collected:  08/31/20 0755     Updated:  08/31/20 0803    Narrative:       EXAMINATION: CT CHEST WO CONTRAST- 8/31/2020 7:55 AM CDT     HISTORY: Chest pain, shortness of breath     COMPARISON: CT chest without contrast 5/15/2020, CT chest without  contrast 1/18/2018     DOSE: 444 mGy-cm     TECHNIQUE: Sequential imaging was performed from the thoracic inlet  through the upper abdomen without the use of IV contrast.  Sagittal and  coronal reformations were made from the original source data and  reviewed. Automated exposure control was also utilized to decrease  patient radiation dose.     FINDINGS:   The visualized thyroid gland is grossly normal in appearance. The  trachea and main bronchi appear widely patent and in normal anatomic  position. The esophagus is grossly normal in appearance.     No pathologically enlarged axillary, mediastinal, or hilar lymph nodes  are identified.      The heart appears normal in size. There has been prior CABG. Coronary  artery calcifications are present. Atherosclerotic calcifications are  also seen within the aorta and its branch vessels. The ascending  thoracic aorta and main pulmonary artery appear normal in caliber. There  is no pericardial or pleural effusion.     A noncalcified nodule is seen in the medial right lower lobe measuring 9  mm in size, stable from multiple prior exams dating to January 2018.  Mild bronchial wall thickening is noted. The lungs otherwise appear  clear.     Please see the separate CT abdomen and pelvis report from the same day  for findings in the upper abdomen.     Review of the visualized osseous structures demonstrates no acute or  aggressive lesions. Degenerative osteophyte formation is noted in the  spine. There has been prior median sternotomy.        Impression:          1. No acute findings in the chest.     2. Atherosclerosis of the aorta and coronary arteries, status post CABG.     3. Mild bronchial wall thickening.     A preliminary report was provided by stat [facility].     This report was finalized on 08/31/2020 08:00 by Dr. Justen Figueroa MD.    CT Abdomen Pelvis Without Contrast [[id number]] Collected:  08/31/20 0741     Updated:  08/31/20 0749    Narrative:       EXAMINATION: CT ABDOMEN PELVIS WO CONTRAST- 8/31/2020 7:41 AM CDT     HISTORY: Abdominal pain     COMPARISON: CT abdomen and pelvis without contrast 1/21/2020     DOSE: 1874 mGy-cm     TECHNIQUE: Sequential imaging was performed from the lung bases through  the pubic symphysis without the use of IV contrast.  Sagittal and  coronal reformations were made from the original source data and  reviewed. Automated exposure control was also utilized to decrease  patient radiation dose.     FINDINGS:   Please see the separate CT chest report from same day for findings in  the lung bases. A noncalcified nodule is noted in the medial right lower  lobe, stable.      Evaluation is limited due to the lack of IV contrast. The gallbladder is  surgically absent. The liver, spleen, and adrenal glands are  unremarkable. There is diffuse fatty atrophy of the pancreas.  Low-density lesions are noted in the kidneys bilaterally, incompletely  characterized on this exam but stable compared to the prior exam. There  is nonspecific bilateral perinephric stranding.     The abdominal aorta appears normal in caliber. There are scattered  atherosclerotic calcifications of the aorta and its branch vessels.     No pathologically enlarged central mesenteric or retroperitoneal lymph  nodes are identified.     The stomach and small bowel appear normal in caliber. A large amount  stool is noted throughout the colon, suggesting fecal stasis. Large  bowel is otherwise grossly normal in appearance. No free air is seen in  the abdomen.     Urinary bladder is grossly normal in appearance. No free fluid is seen  in the pelvis. No pelvic or inguinal lymphadenopathy is identified.  There is a fat-containing right inguinal hernia. No free fluid is seen  in the pelvis. There is some mild stranding of the inferior ventral  abdominal wall.     Review of the visualized osseous structures demonstrates no acute or  aggressive lesions. Degenerative osteophyte formation is noted in the  spine. Degenerative changes are also noted in the SI joints. There has  been prior median sternotomy.       Impression:       1. Fecal stasis.     2. Otherwise, no acute findings in the abdomen or pelvis.     3. Atherosclerotic disease.  4. Nonspecific bilateral perinephric stranding, which is often seen with  chronic kidney disease.  5. Mild soft tissue stranding of the ventral abdominal wall inferiorly.     A preliminary report was provided by [facility].     This report was finalized on 08/31/2020 07:46 by Dr. Justen Figueroa MD.    XR Chest 1 View [[id number]] Collected:  08/31/20 0649     Updated:  08/31/20 0652    Narrative:        Frontal upright radiograph of the chest 8/30/2020 11:17 PM CDT     COMPARISON: February 14, 2020     HISTORY: Chest pain.     FINDINGS:   The lungs are clear. The cardiac silhouette is normal. Wires are present  from previous median sternotomy and CABG.      The osseous structures and surrounding soft tissues demonstrate no acute  abnormality.       Impression:       1. No radiographic evidence of acute cardiopulmonary process.        This report was finalized on 08/31/2020 06:49 by Dr. Eddie Winter MD.        Chief Complaint on Day of Discharge: Overall, patient reports he feels much better.  He denies shortness of breath, chest pain or pressure.  He feels ready to return home.    Hospital Course:  The patient is a 65 y.o. male who presented to [facility] with generalized malaise.  He has a past medical history significant for chronic kidney disease stage IV -baseline creatinine around 2, controlled type 2 diabetes mellitus with insulin dependence, hypertension with chronic orthostatic hypotension, congestive heart failure, hypertension, coronary artery disease status post CABG, and sleep apnea with use of CPAP.  Presented with a 3-day history of feeling poorly.  Patient complained of chest discomfort and shortness of breath.  He also complained of diarrhea and vomiting.  Question of black stool and melena/black emesis.  Patient reported worsening bilateral lower extremity edema.  Denied fever or chills.  Upon evaluation in the emergency department, he was found to have acute kidney injury superimposed on chronic kidney disease.  Creatinine elevated at 2.68 with an estimated GFR of 24.  Blood sugar 733, Sodium level 129, and Serum osmolality elevated at 312.  Hemoglobin lower than previous at 9.0 but consistent with anemia of chronic kidney disease.  BNP slightly elevated at 1321.  CTA of the chest with no acute findings.  He was admitted to the hospitalist service for further evaluation and  "management.    He was seen in consultation by gastroenterology.  Hemoglobin and hematocrit have remained stable.  Hemoccult negative.  Patient underwent a EGD on 9/1/2020, results showed normal esophagus, nonerosive gastritis with hemorrhage -biopsied.  Normal examined duodenum.  Recommendations are to continue PPI and add Carafate for a month.  Patient will need follow-up with Dr. Smith.  He was also seen in consultation by nephrology.  The patient has not been seen in their office since March 2019.  Patient has baseline chronic kidney disease stage IV.  Baseline creatinine approximately 2.  On admission creatinine noted to be 2.68.  Renal function has improved, creatinine noted to be 1.81 today.  Sodium 139.  He was started on IV Bumex.  The patient has diuresed well.  He will be transitioned to oral Bumex 1 mg daily.  He is to follow-up with nephrology on outpatient basis per recommendations.  He was started on Levaquin for skin and soft tissue infection.   Hemoglobin A1c elevated at 10.9%.  The patient freely admits to dietary noncompliance and lack of adherence to his medical regimen.  Long discussion with patient regarding importance of compliance to diet and medical regimen moving forward.  Patient reports he is going to \"try better.\"  Overall, patient reports he is feeling much better.  Patient states that his bilateral lower extremity swelling has significantly improved.  He denies shortness of breath, chest pain or pressure.  He is hemodynamically stable and on room air.  He denies urinary or bowel complaints.  He is appropriate for discharge home today.  He is to follow-up with his primary care provider Dr. Shetty within 1 week.  He is to follow-up with neurology per recommendations.  He is to follow-up with gastroenterology per recommendations.    Condition on Discharge:  Medically stable.    Physical Exam on Discharge:  /70   Pulse 80   Temp 98.1 °F (36.7 °C) (Oral)   Resp 16   Ht 182.9 cm " "(72\")   Wt (!) 157 kg (346 lb 2 oz)   SpO2 98%   BMI 46.94 kg/m²   Physical Exam  Constitutional: He is oriented to person, place, and time. He appears well-developed and well-nourished. No distress.   Lying in bed.  No distress.  Tolerating room air.   HENT:   Head: Atraumatic.   Mouth/Throat: No oropharyngeal exudate.   Eyes: EOM are normal.   Neck: Neck supple.   Cardiovascular: Normal rate, regular rhythm, normal heart sounds and intact distal pulses.   Pulmonary/Chest: Effort normal. No respiratory distress. He has decreased breath sounds.   Abdominal: Soft. Bowel sounds are normal. He exhibits no distension. There is no tenderness. There is no guarding.   Protuberant.   Musculoskeletal: Normal range of motion. He exhibits edema (BLE- much improved).   Lymphadenopathy:     He has no cervical adenopathy.   Neurological: He is alert and oriented to person, place, and time. No cranial nerve deficit.   Skin: Skin is warm and dry.  Psychiatric: He has a normal mood and affect. His behavior is normal.   Nursing note and vitals reviewed.    Discharge Disposition:  Home or Self Care    Discharge Medications:     Discharge Medications      New Medications      Instructions Start Date   levoFLOXacin 250 MG tablet  Commonly known as:  Levaquin   250 mg, Oral, Daily      sucralfate 1 g tablet  Commonly known as:  CARAFATE   2 g, Oral, 2 Times Daily Before Meals         Changes to Medications      Instructions Start Date   bumetanide 1 MG tablet  Commonly known as:  BUMEX  What changed:    · medication strength  · how much to take  · when to take this  · reasons to take this   1 mg, Oral, Daily         Continue These Medications      Instructions Start Date   aspirin 81 MG EC tablet   81 mg, Oral, Daily      cloNIDine 0.1 MG tablet  Commonly known as:  CATAPRES   0.1 mg, Oral, 2 Times Daily PRN      Crestor 40 MG tablet  Generic drug:  rosuvastatin   40 mg, Oral, Nightly      cyclobenzaprine 5 MG tablet  Commonly known " as:  FLEXERIL   5 mg, Oral, 2 Times Daily PRN      diclofenac 1 % gel gel  Commonly known as:  VOLTAREN   4 g, Topical, 4 Times Daily      DULoxetine 60 MG capsule  Commonly known as:  CYMBALTA   60 mg, Oral, Daily      gabapentin 300 MG capsule  Commonly known as:  NEURONTIN   300 mg, Oral, 3 Times Daily      insulin glargine 100 UNIT/ML injection  Commonly known as:  LANTUS   20 Units, Subcutaneous, Nightly      insulin regular 500 UNIT/ML CONCENTRATED injection  Commonly known as:  humuLIN R   100 Units, Subcutaneous, 3 Times Daily Before Meals, Packaging states 100 units with regular meals; 120 units with large meals or 360 units daily      lactulose 10 GM/15ML solution  Commonly known as:  CHRONULAC   20 g, Oral, 3 Times Daily PRN      latanoprost 0.005 % ophthalmic solution  Commonly known as:  XALATAN   1 drop, Both Eyes, Nightly      oxyCODONE-acetaminophen  MG per tablet  Commonly known as:  PERCOCET   1 tablet, Oral, 2 Times Daily      pantoprazole 40 MG EC tablet  Commonly known as:  PROTONIX   40 mg, Oral, Daily      tamsulosin 0.4 MG capsule 24 hr capsule  Commonly known as:  Flomax   0.4 mg, Oral, Daily      traZODone 100 MG tablet  Commonly known as:  DESYREL   100 mg, Oral, Nightly PRN             Discharge Diet:   Diet Instructions     Diet: Regular, Consistent Carbohydrate, Renal, Cardiac      Discharge Diet:   Regular  Consistent Carbohydrate  Renal  Cardiac             Activity at Discharge:   Activity Instructions     Activity as Tolerated            Discharge Care Plan/Instructions:   1.  Follow-up with primary care provider Dr. Shetty within 1 week.  2.  Follow-up with nephrology per recommendations.  3.  Follow-up with GI per recommendations.  4.  Continue Levaquin until completion.  5.  Continue 1 mg of Bumex daily.  6.  Continue Carafate 2 g orally for one month.    Follow-up Appointments:   Future Appointments   Date Time Provider Department Center   9/24/2020  9:15 AM Karel  SUSAN Jacobo [facility] None   11/30/2020 10:45 AM Dougie Taylor MD [facility] None       Test Results Pending at Discharge: None.    Electronically signed by SUSAN Roger, 09/02/20, 17:02.    Time: 40 minutes.

## 2020-09-02 NOTE — PLAN OF CARE
Problem: Patient Care Overview  Goal: Plan of Care Review  Outcome: Outcome(s) achieved  Flowsheets  Taken 9/2/2020 1150  Progress: improving  Outcome Summary: Pt is A&Ox4. VSS. 12u insuling given at breakfast and lunch. Pt non-compliant with carb diet and wound care. Educated pt on carb consistent diet and how to care for diabetic ulcers. Pt takes drsngs off feet. Voiding frequently. Discharge home in process. No neuro changes. Safety maintained. Will cont to monitor.  Taken 9/2/2020 0836  Plan of Care Reviewed With: patient;spouse

## 2020-09-02 NOTE — PLAN OF CARE
Problem: Patient Care Overview  Goal: Plan of Care Review  Outcome: Ongoing (interventions implemented as appropriate)  Flowsheets (Taken 9/2/2020 0813)  Progress: no change  Plan of Care Reviewed With: patient  Note:   VSS. Pt continue to c/o intermit pain. Scheduled meds given, prn for headache given. Blood glucose continue elevated. Drsg to lt foot done per order. Pt voiding per urinal. Safety maintained.

## 2020-09-02 NOTE — NURSING NOTE
WOCN Note      Patient: Erick Luong  MRN: [id number] : 1955         Problem List:   Patient Active Problem List    Diagnosis   • *Acute kidney injury superimposed on chronic kidney disease (CMS/[facility]) [N17.9, N18.9]   • Hyperosmolarity due to secondary diabetes mellitus (CMS/[facility]) [E13.00]   • Uncontrolled type 2 diabetes mellitus with hyperglycemia (CMS/[facility]) [E11.65]   • Medically noncompliant [Z91.19]   • Diabetic foot ulcer (CMS/[facility]) [E11.621, L97.509]   • CKD (chronic kidney disease) stage 4, GFR 15-29 ml/min (CMS/[facility]) [N18.4]   • Anasarca [R60.1]   • Sleep apnea with use of continuous positive airway pressure (CPAP) [G47.30]   • Morbid obesity with BMI of 45.0-49.9, adult (CMS/[facility]) [E66.01, Z68.42]   • Anemia due to chronic kidney disease [N18.9, D63.1]   • HTN (hypertension), benign [I10]   • Type 2 diabetes mellitus with hyperglycemia, with long-term current use of insulin (CMS/[facility]) [E11.65, Z79.4]         Reason for Visit: Re-evaluation of wounds on feet.       Wife states dressings were not in place throughout night.  After discussion with ELVIS Saenz, there has been difficulty keeping the dressing in place due the patient removing his socks and the location of the wounds.  Currently dressings are placed with gauze taped around toes.      Options for securement dressing: stockinette, ace wrap applied with out compression, wrap entire foot with Kerlix rather than just toes.      If any issues with this, please call [phone]      )Dunia Wolfe RN 2020

## 2020-09-03 ENCOUNTER — READMISSION MANAGEMENT (OUTPATIENT)
Dept: CALL CENTER | Facility: HOSPITAL | Age: 65
End: 2020-09-03

## 2020-09-03 NOTE — OUTREACH NOTE
Prep Survey      Responses   [facility] facility patient discharged from?  [place]   Is LACE score < 7 ?  No   Eligibility  Readm Mgmt   Discharge diagnosis  Acute kidney injury superimposed on chronic kidney disease    Does the patient have one of the following disease processes/diagnoses(primary or secondary)?  Other   Does the patient have Home health ordered?  No   Is there a DME ordered?  No   Prep survey completed?  Yes          Krystina Dodge RN

## 2020-09-05 ENCOUNTER — READMISSION MANAGEMENT (OUTPATIENT)
Dept: CALL CENTER | Facility: HOSPITAL | Age: 65
End: 2020-09-05

## 2020-09-05 NOTE — OUTREACH NOTE
Medical Week 1 Survey      Responses   [facility] patient discharged from?  [place]   COVID-19 Test Status  Negative   Does the patient have one of the following disease processes/diagnoses(primary or secondary)?  Other   Is there a successful TCM telephone encounter documented?  No   Week 1 attempt successful?  No   Unsuccessful attempts  Attempt 1          Perlita [place] RN

## 2020-09-10 ENCOUNTER — READMISSION MANAGEMENT (OUTPATIENT)
Dept: CALL CENTER | Facility: HOSPITAL | Age: 65
End: 2020-09-10

## 2020-09-10 NOTE — OUTREACH NOTE
Medical Week 1 Survey      Responses   [facility] patient discharged from?  [place]   COVID-19 Test Status  Negative   Does the patient have one of the following disease processes/diagnoses(primary or secondary)?  Other   Is there a successful TCM telephone encounter documented?  No   Week 1 attempt successful?  No   Unsuccessful attempts  Attempt 2          Jenise Perez RN

## 2020-09-15 ENCOUNTER — READMISSION MANAGEMENT (OUTPATIENT)
Dept: CALL CENTER | Facility: HOSPITAL | Age: 65
End: 2020-09-15

## 2020-09-15 NOTE — OUTREACH NOTE
Medical Week 1 Survey      Responses   [facility] patient discharged from?  [place]   COVID-19 Test Status  Negative   Does the patient have one of the following disease processes/diagnoses(primary or secondary)?  Other   Is there a successful TCM telephone encounter documented?  No   Week 1 attempt successful?  Yes   Call start time  1312   Rescheduled  Rescheduled-pt requested   Call end time  1312          Tricia Ferreira, RN

## 2020-09-24 ENCOUNTER — OFFICE VISIT (OUTPATIENT)
Dept: GASTROENTEROLOGY | Facility: CLINIC | Age: 65
End: 2020-09-24

## 2020-09-24 ENCOUNTER — EPISODE CHANGES (OUTPATIENT)
Dept: CASE MANAGEMENT | Facility: OTHER | Age: 65
End: 2020-09-24

## 2020-09-24 VITALS
WEIGHT: 315 LBS | SYSTOLIC BLOOD PRESSURE: 118 MMHG | OXYGEN SATURATION: 98 % | DIASTOLIC BLOOD PRESSURE: 68 MMHG | TEMPERATURE: 97.8 F | HEIGHT: 72 IN | BODY MASS INDEX: 42.66 KG/M2 | HEART RATE: 84 BPM

## 2020-09-24 DIAGNOSIS — K29.71 GASTRITIS WITH HEMORRHAGE, UNSPECIFIED CHRONICITY, UNSPECIFIED GASTRITIS TYPE: Primary | ICD-10-CM

## 2020-09-24 DIAGNOSIS — Z78.9 NONSMOKER: ICD-10-CM

## 2020-09-24 DIAGNOSIS — E66.9 OBESITY, UNSPECIFIED OBESITY SEVERITY, UNSPECIFIED OBESITY TYPE: ICD-10-CM

## 2020-09-24 PROCEDURE — 99213 OFFICE O/P EST LOW 20 MIN: CPT | Performed by: CLINICAL NURSE SPECIALIST

## 2020-09-28 ENCOUNTER — EPISODE CHANGES (OUTPATIENT)
Dept: CASE MANAGEMENT | Facility: OTHER | Age: 65
End: 2020-09-28

## 2020-10-09 ENCOUNTER — LAB (OUTPATIENT)
Dept: LAB | Facility: HOSPITAL | Age: 65
End: 2020-10-09

## 2020-10-09 DIAGNOSIS — E55.9 VITAMIN D DEFICIENCY, UNSPECIFIED: ICD-10-CM

## 2020-10-09 DIAGNOSIS — I10 ESSENTIAL (PRIMARY) HYPERTENSION: ICD-10-CM

## 2020-10-09 DIAGNOSIS — N40.0 BENIGN PROSTATIC HYPERPLASIA WITHOUT LOWER URINARY TRACT SYMPTOMS: ICD-10-CM

## 2020-10-09 DIAGNOSIS — IMO0002 DM (DIABETES MELLITUS), TYPE 2, UNCONTROLLED W/NEUROLOGIC COMPLICATION: ICD-10-CM

## 2020-10-09 DIAGNOSIS — E11.49 TYPE 2 DIABETES MELLITUS WITH OTHER DIABETIC NEUROLOGICAL COMPLICATION (HCC): ICD-10-CM

## 2020-10-09 PROCEDURE — 85025 COMPLETE CBC W/AUTO DIFF WBC: CPT | Performed by: FAMILY MEDICINE

## 2020-10-09 PROCEDURE — 83036 HEMOGLOBIN GLYCOSYLATED A1C: CPT | Performed by: FAMILY MEDICINE

## 2020-10-09 PROCEDURE — 36415 COLL VENOUS BLD VENIPUNCTURE: CPT

## 2020-10-09 PROCEDURE — 82570 ASSAY OF URINE CREATININE: CPT | Performed by: FAMILY MEDICINE

## 2020-10-09 PROCEDURE — 82306 VITAMIN D 25 HYDROXY: CPT | Performed by: FAMILY MEDICINE

## 2020-10-09 PROCEDURE — 82043 UR ALBUMIN QUANTITATIVE: CPT | Performed by: FAMILY MEDICINE

## 2020-10-09 PROCEDURE — G0103 PSA SCREENING: HCPCS | Performed by: FAMILY MEDICINE

## 2020-10-09 PROCEDURE — 84443 ASSAY THYROID STIM HORMONE: CPT | Performed by: FAMILY MEDICINE

## 2020-10-09 PROCEDURE — 80053 COMPREHEN METABOLIC PANEL: CPT | Performed by: FAMILY MEDICINE

## 2020-10-09 PROCEDURE — 80061 LIPID PANEL: CPT | Performed by: FAMILY MEDICINE

## 2020-10-10 LAB
25(OH)D3 SERPL-MCNC: 38.3 NG/ML (ref 30–100)
ALBUMIN SERPL-MCNC: 4 G/DL (ref 3.5–5.2)
ALBUMIN UR-MCNC: 2.4 MG/DL
ALBUMIN/GLOB SERPL: 1.4 G/DL
ALP SERPL-CCNC: 74 U/L (ref 39–117)
ALT SERPL W P-5'-P-CCNC: 13 U/L (ref 1–41)
ANION GAP SERPL CALCULATED.3IONS-SCNC: 12.9 MMOL/L (ref 5–15)
AST SERPL-CCNC: 15 U/L (ref 1–40)
BASOPHILS # BLD AUTO: 0.05 10*3/MM3 (ref 0–0.2)
BASOPHILS NFR BLD AUTO: 0.8 % (ref 0–1.5)
BILIRUB SERPL-MCNC: 0.3 MG/DL (ref 0–1.2)
BUN SERPL-MCNC: 63 MG/DL (ref 8–23)
BUN/CREAT SERPL: 24.9 (ref 7–25)
CALCIUM SPEC-SCNC: 9.3 MG/DL (ref 8.6–10.5)
CHLORIDE SERPL-SCNC: 98 MMOL/L (ref 98–107)
CHOLEST SERPL-MCNC: 113 MG/DL (ref 0–200)
CO2 SERPL-SCNC: 26.1 MMOL/L (ref 22–29)
CREAT SERPL-MCNC: 2.53 MG/DL (ref 0.76–1.27)
CREAT UR-MCNC: 80 MG/DL
DEPRECATED RDW RBC AUTO: 43.4 FL (ref 37–54)
EOSINOPHIL # BLD AUTO: 0.42 10*3/MM3 (ref 0–0.4)
EOSINOPHIL NFR BLD AUTO: 6.5 % (ref 0.3–6.2)
ERYTHROCYTE [DISTWIDTH] IN BLOOD BY AUTOMATED COUNT: 12.9 % (ref 12.3–15.4)
GFR SERPL CREATININE-BSD FRML MDRD: 26 ML/MIN/1.73
GLOBULIN UR ELPH-MCNC: 2.9 GM/DL
GLUCOSE SERPL-MCNC: 215 MG/DL (ref 65–99)
HBA1C MFR BLD: 8.8 % (ref 4.8–5.6)
HCT VFR BLD AUTO: 29 % (ref 37.5–51)
HDLC SERPL-MCNC: 36 MG/DL (ref 40–60)
HGB BLD-MCNC: 9.3 G/DL (ref 13–17.7)
IMM GRANULOCYTES # BLD AUTO: 0.04 10*3/MM3 (ref 0–0.05)
IMM GRANULOCYTES NFR BLD AUTO: 0.6 % (ref 0–0.5)
LDLC SERPL CALC-MCNC: 53 MG/DL (ref 0–100)
LDLC/HDLC SERPL: 1.37 {RATIO}
LYMPHOCYTES # BLD AUTO: 1.09 10*3/MM3 (ref 0.7–3.1)
LYMPHOCYTES NFR BLD AUTO: 16.8 % (ref 19.6–45.3)
MCH RBC QN AUTO: 29.2 PG (ref 26.6–33)
MCHC RBC AUTO-ENTMCNC: 32.1 G/DL (ref 31.5–35.7)
MCV RBC AUTO: 91.2 FL (ref 79–97)
MICROALBUMIN/CREAT UR: 30 MG/G
MONOCYTES # BLD AUTO: 0.64 10*3/MM3 (ref 0.1–0.9)
MONOCYTES NFR BLD AUTO: 9.8 % (ref 5–12)
NEUTROPHILS NFR BLD AUTO: 4.26 10*3/MM3 (ref 1.7–7)
NEUTROPHILS NFR BLD AUTO: 65.5 % (ref 42.7–76)
NRBC BLD AUTO-RTO: 0 /100 WBC (ref 0–0.2)
PLATELET # BLD AUTO: 232 10*3/MM3 (ref 140–450)
PMV BLD AUTO: 11.9 FL (ref 6–12)
POTASSIUM SERPL-SCNC: 5.1 MMOL/L (ref 3.5–5.2)
PROT SERPL-MCNC: 6.9 G/DL (ref 6–8.5)
PSA SERPL-MCNC: 0.13 NG/ML (ref 0–4)
RBC # BLD AUTO: 3.18 10*6/MM3 (ref 4.14–5.8)
SODIUM SERPL-SCNC: 137 MMOL/L (ref 136–145)
TRIGL SERPL-MCNC: 138 MG/DL (ref 0–150)
TSH SERPL DL<=0.05 MIU/L-ACNC: 1.49 UIU/ML (ref 0.27–4.2)
VLDLC SERPL-MCNC: 24 MG/DL (ref 5–40)
WBC # BLD AUTO: 6.5 10*3/MM3 (ref 3.4–10.8)

## 2020-11-29 NOTE — PROGRESS NOTES
Subjective   Erick Luong is a 65 y.o. male.     Background: Pt with CKD, osteo foot, anasarca, wilder, 5 and 9 mm nodules in RLL on ct abd 1/22/20, 1 cm on ct abd 1/20/20, not reported 10/27/19 on ct angiogram chest    Chief Complaint   Patient presents with   • multiple lung nodules     No hospitalizations; no exposure; tested and negative        History of Present Illness   He is essentially a nonsmoker.  He had a ct in august as below.  Kidneys remain tenuous but ok.  Nodule is present continuously in lung for undetermined time, at least 2 years, of undetermined severity, with no modifying factors or associated symptoms.     Medical/Family/Social History   has a past medical history of Arthritis, CAD (coronary artery disease) (2/6/2017), Coronary artery disease, Diabetes mellitus (CMS/HCC), Gastroesophageal reflux disease (5/13/2019), HTN (hypertension), benign (5/3/2017), Hyperlipidemia, Hypertension, Mixed hyperlipidemia (2/7/2017), Multiple lung nodules (1/26/2020), Myocardial infarction (CMS/HCC), Osteomyelitis (CMS/HCC) (1/22/2020), Osteomyelitis of fifth toe of right foot (CMS/HCC) (10/7/2019), Pancreatitis, Persistent insomnia (1/20/2020), Renal disorder, Sleep apnea (2/6/2017), Sleep apnea with use of continuous positive airway pressure (CPAP), and Slow transit constipation (1/16/2019).   has a past surgical history that includes Appendectomy; Abdominal surgery; Cataract extraction; Cardiac surgery; Incision and drainage of wound (Left, 09/2007); Colonoscopy w/ polypectomy (03/04/2014); Esophagogastroduodenoscopy (04/13/2011); Cervical spine surgery; Colonoscopy (N/A, 1/31/2017); Esophagogastroduodenoscopy (N/A, 5/5/2017); Coronary artery bypass graft (10/2015); Back surgery; Esophagogastroduodenoscopy (N/A, 2/11/2019); Colonoscopy (N/A, 2/11/2019); and Esophagogastroduodenoscopy (N/A, 9/1/2020).  family history includes Alzheimer's disease in his mother; Colon cancer in his father and sister; Colon polyps  "in his sister; Coronary artery disease in his sister and sister; Heart disease in his father.   reports that he has never smoked. He has never used smokeless tobacco. He reports that he does not drink alcohol or use drugs.  Allergies   Allergen Reactions   • Bactrim [Sulfamethoxazole-Trimethoprim] Other (See Comments)     \"RENAL FAILURE\"   • Vancomycin Itching   • Clindamycin Unknown - Low Severity   • Zolpidem Tartrate Unknown - Low Severity   • Metronidazole Rash     Medications    Current Outpatient Medications:   •  aspirin 81 MG EC tablet, Take 81 mg by mouth Daily., Disp: , Rfl:   •  cyclobenzaprine (FLEXERIL) 5 MG tablet, Take 5 mg by mouth 2 (Two) Times a Day As Needed for Muscle Spasms., Disp: , Rfl:   •  donepezil (ARICEPT) 5 MG tablet, Take 5 mg by mouth every night at bedtime., Disp: , Rfl:   •  DULoxetine (CYMBALTA) 60 MG capsule, Take 60 mg by mouth Daily., Disp: , Rfl:   •  gabapentin (NEURONTIN) 300 MG capsule, Take 300 mg by mouth 3 (Three) Times a Day., Disp: , Rfl:   •  insulin glargine (LANTUS) 100 UNIT/ML injection, Inject 20 Units under the skin into the appropriate area as directed Every Night., Disp: , Rfl:   •  insulin regular (humuLIN R) 500 UNIT/ML CONCENTRATED injection, Inject 100 Units under the skin 3 (Three) Times a Day Before Meals. Packaging states 100 units with regular meals; 120 units with large meals or 360 units daily, Disp: , Rfl:   •  lactulose (CHRONULAC) 10 GM/15ML solution, Take 20 g by mouth 3 (Three) Times a Day As Needed (constipation)., Disp: , Rfl:   •  latanoprost (XALATAN) 0.005 % ophthalmic solution, Administer 1 drop to both eyes Every Night., Disp: , Rfl:   •  oxyCODONE-acetaminophen (PERCOCET)  MG per tablet, Take 1 tablet by mouth 2 (Two) Times a Day., Disp: , Rfl:   •  pantoprazole (PROTONIX) 40 MG EC tablet, Take 1 tablet by mouth Daily., Disp: 90 tablet, Rfl: 3  •  rosuvastatin (CRESTOR) 40 MG tablet, Take 40 mg by mouth Every Night., Disp: , Rfl:   •  " "tamsulosin (FLOMAX) 0.4 MG capsule 24 hr capsule, Take 1 capsule by mouth Daily., Disp: 30 capsule, Rfl: 11  •  traZODone (DESYREL) 100 MG tablet, Take 100 mg by mouth At Night As Needed for Sleep., Disp: , Rfl:   •  bumetanide (BUMEX) 1 MG tablet, Take 1 tablet by mouth Daily for 30 days., Disp: 30 tablet, Rfl: 0    Review of Systems   Constitutional: Negative for chills and fever.   Cardiovascular: Negative for chest pain.   Gastrointestinal: Negative for nausea and vomiting.       Objective   /76   Pulse 93   Temp 97.9 °F (36.6 °C)   Ht 182.9 cm (72\")   Wt (!) 148 kg (326 lb)   SpO2 98% Comment: RA  BMI 44.21 kg/m²   Physical Exam  Constitutional:       General: He is not in acute distress.     Appearance: He is well-developed. He is not diaphoretic.   HENT:      Head: Normocephalic and atraumatic.   Eyes:      General: No scleral icterus.  Neck:      Vascular: No JVD.      Trachea: No tracheal deviation.   Cardiovascular:      Rate and Rhythm: Normal rate and regular rhythm.   Pulmonary:      Effort: No respiratory distress.      Breath sounds: No stridor. No wheezing.   Abdominal:      Palpations: Abdomen is soft.      Tenderness: There is no abdominal tenderness. There is no guarding.   Skin:     General: Skin is warm and dry.   Psychiatric:         Behavior: Behavior normal.       -----------------------------------------------------------------------------------------------  EXAMINATION: CT CHEST WO CONTRAST- 8/31/2020 7:55 AM CDT     HISTORY: Chest pain, shortness of breath  COMPARISON: CT chest without contrast 5/15/2020, CT chest without  contrast 1/18/2018  FINDINGS:   The visualized thyroid gland is grossly normal in appearance. The  trachea and main bronchi appear widely patent and in normal anatomic  position. The esophagus is grossly normal in appearance.  No pathologically enlarged axillary, mediastinal, or hilar lymph nodes  are identified.  The heart appears normal in size. There has " been prior CABG. Coronary  artery calcifications are present. Atherosclerotic calcifications are  also seen within the aorta and its branch vessels. The ascending  thoracic aorta and main pulmonary artery appear normal in caliber. There  is no pericardial or pleural effusion.  A noncalcified nodule is seen in the medial right lower lobe measuring 9 mm in size, stable from multiple prior exams dating to January 2018.  Mild bronchial wall thickening is noted. The lungs otherwise appear clear.  Please see the separate CT abdomen and pelvis report from the same day  for findings in the upper abdomen.  Review of the visualized osseous structures demonstrates no acute or  aggressive lesions. Degenerative osteophyte formation is noted in the  spine. There has been prior median sternotomy.   IMPRESSION:  1. No acute findings in the chest.  2. Atherosclerosis of the aorta and coronary arteries, status post CABG.  3. Mild bronchial wall thickening.  A preliminary report was provided by stat rad.  This report was finalized on 08/31/2020 08:00 by Dr. Justen Figueroa MD.    EXAMINATION: CT ABDOMEN PELVIS WO CONTRAST- 8/31/2020 7:41 AM CDT     HISTORY: Abdominal pain  COMPARISON: CT abdomen and pelvis without contrast 1/21/2020  DOSE: 1874 mGy-cm  TECHNIQUE: Sequential imaging was performed from the lung bases through  the pubic symphysis without the use of IV contrast.  Sagittal and  coronal reformations were made from the original source data and  reviewed. Automated exposure control was also utilized to decrease  patient radiation dose.  FINDINGS:   Please see the separate CT chest report from same day for findings in  the lung bases. A noncalcified nodule is noted in the medial right lower  lobe, stable.  Evaluation is limited due to the lack of IV contrast. The gallbladder is  surgically absent. The liver, spleen, and adrenal glands are  unremarkable. There is diffuse fatty atrophy of the pancreas.  Low-density lesions are  noted in the kidneys bilaterally, incompletely  characterized on this exam but stable compared to the prior exam. There  is nonspecific bilateral perinephric stranding.  The abdominal aorta appears normal in caliber. There are scattered  atherosclerotic calcifications of the aorta and its branch vessels.  No pathologically enlarged central mesenteric or retroperitoneal lymph  nodes are identified.  The stomach and small bowel appear normal in caliber. A large amount  stool is noted throughout the colon, suggesting fecal stasis. Large  bowel is otherwise grossly normal in appearance. No free air is seen in  the abdomen.  Urinary bladder is grossly normal in appearance. No free fluid is seen  in the pelvis. No pelvic or inguinal lymphadenopathy is identified.  There is a fat-containing right inguinal hernia. No free fluid is seen  in the pelvis. There is some mild stranding of the inferior ventral  abdominal wall.  Review of the visualized osseous structures demonstrates no acute or  aggressive lesions. Degenerative osteophyte formation is noted in the  spine. Degenerative changes are also noted in the SI joints. There has  been prior median sternotomy.  IMPRESSION:  1. Fecal stasis.  2. Otherwise, no acute findings in the abdomen or pelvis.  3. Atherosclerotic disease.  4. Nonspecific bilateral perinephric stranding, which is often seen with  chronic kidney disease.  5. Mild soft tissue stranding of the ventral abdominal wall inferiorly.  A preliminary report was provided by stat rad.  This report was finalized on 08/31/2020 07:46 by Dr. Justen Figueroa MD.  -----------------------------------------------------------------------------------------------         -----------------------------------------------------------------------------------------------  Assessment/Plan   Problem List Items Addressed This Visit        Pulmonary Problems    Sleep apnea with use of continuous positive airway pressure (CPAP)       Other     CKD (chronic kidney disease) stage 4, GFR 15-29 ml/min (CMS/HCC)      Other Visit Diagnoses     Multiple lung nodules    -  Primary        Patient's Body mass index is 44.21 kg/m². BMI is above normal parameters. Recommendations include: referral to primary care.      Reid is stable  We have 2 years surveillance scans on lung nodule; no additional scan necessary  Has had flu shot  coronavirus avoidance measures to continue  Follow up as needed.       Electronically signed by Dougie Taylor MD, 11/30/2020, 11:15 CST

## 2020-11-30 ENCOUNTER — OFFICE VISIT (OUTPATIENT)
Dept: PULMONOLOGY | Facility: CLINIC | Age: 65
End: 2020-11-30

## 2020-11-30 VITALS
OXYGEN SATURATION: 98 % | BODY MASS INDEX: 42.66 KG/M2 | WEIGHT: 315 LBS | HEART RATE: 93 BPM | DIASTOLIC BLOOD PRESSURE: 76 MMHG | HEIGHT: 72 IN | TEMPERATURE: 97.9 F | SYSTOLIC BLOOD PRESSURE: 142 MMHG

## 2020-11-30 DIAGNOSIS — R91.8 MULTIPLE LUNG NODULES: Primary | ICD-10-CM

## 2020-11-30 DIAGNOSIS — G47.30 SLEEP APNEA WITH USE OF CONTINUOUS POSITIVE AIRWAY PRESSURE (CPAP): ICD-10-CM

## 2020-11-30 DIAGNOSIS — N18.4 CKD (CHRONIC KIDNEY DISEASE) STAGE 4, GFR 15-29 ML/MIN (HCC): ICD-10-CM

## 2020-11-30 PROCEDURE — 99213 OFFICE O/P EST LOW 20 MIN: CPT | Performed by: INTERNAL MEDICINE

## 2020-11-30 RX ORDER — DONEPEZIL HYDROCHLORIDE 5 MG/1
5 TABLET, FILM COATED ORAL
COMMUNITY
Start: 2020-11-03 | End: 2021-03-11

## 2020-12-16 ENCOUNTER — OFFICE VISIT (OUTPATIENT)
Dept: GASTROENTEROLOGY | Facility: CLINIC | Age: 65
End: 2020-12-16

## 2020-12-16 ENCOUNTER — LAB (OUTPATIENT)
Dept: LAB | Facility: HOSPITAL | Age: 65
End: 2020-12-16

## 2020-12-16 VITALS
WEIGHT: 315 LBS | DIASTOLIC BLOOD PRESSURE: 78 MMHG | SYSTOLIC BLOOD PRESSURE: 132 MMHG | BODY MASS INDEX: 42.66 KG/M2 | HEIGHT: 72 IN | RESPIRATION RATE: 18 BRPM | TEMPERATURE: 98.4 F | HEART RATE: 88 BPM

## 2020-12-16 DIAGNOSIS — E66.9 OBESITY, UNSPECIFIED OBESITY SEVERITY, UNSPECIFIED OBESITY TYPE: ICD-10-CM

## 2020-12-16 DIAGNOSIS — K29.71 GASTRITIS WITH HEMORRHAGE, UNSPECIFIED CHRONICITY, UNSPECIFIED GASTRITIS TYPE: Primary | ICD-10-CM

## 2020-12-16 DIAGNOSIS — Z78.9 NONSMOKER: ICD-10-CM

## 2020-12-16 DIAGNOSIS — I10 HTN (HYPERTENSION), BENIGN: ICD-10-CM

## 2020-12-16 LAB
ALBUMIN SERPL-MCNC: 4.4 G/DL (ref 3.5–5.2)
ALBUMIN/GLOB SERPL: 1.5 G/DL
ALP SERPL-CCNC: 77 U/L (ref 39–117)
ALT SERPL W P-5'-P-CCNC: 13 U/L (ref 1–41)
ANION GAP SERPL CALCULATED.3IONS-SCNC: 11 MMOL/L (ref 5–15)
AST SERPL-CCNC: 16 U/L (ref 1–40)
BASOPHILS # BLD AUTO: 0.03 10*3/MM3 (ref 0–0.2)
BASOPHILS NFR BLD AUTO: 0.3 % (ref 0–1.5)
BILIRUB SERPL-MCNC: 0.3 MG/DL (ref 0–1.2)
BUN SERPL-MCNC: 56 MG/DL (ref 8–23)
BUN/CREAT SERPL: 25.6 (ref 7–25)
CALCIUM SPEC-SCNC: 9.2 MG/DL (ref 8.6–10.5)
CHLORIDE SERPL-SCNC: 96 MMOL/L (ref 98–107)
CO2 SERPL-SCNC: 29 MMOL/L (ref 22–29)
CREAT SERPL-MCNC: 2.19 MG/DL (ref 0.76–1.27)
DEPRECATED RDW RBC AUTO: 45.7 FL (ref 37–54)
EOSINOPHIL # BLD AUTO: 0.51 10*3/MM3 (ref 0–0.4)
EOSINOPHIL NFR BLD AUTO: 5.7 % (ref 0.3–6.2)
ERYTHROCYTE [DISTWIDTH] IN BLOOD BY AUTOMATED COUNT: 14.1 % (ref 12.3–15.4)
FERRITIN SERPL-MCNC: 149 NG/ML (ref 30–400)
GFR SERPL CREATININE-BSD FRML MDRD: 30 ML/MIN/1.73
GLOBULIN UR ELPH-MCNC: 2.9 GM/DL
GLUCOSE SERPL-MCNC: 218 MG/DL (ref 65–99)
HCT VFR BLD AUTO: 32.5 % (ref 37.5–51)
HGB BLD-MCNC: 10.6 G/DL (ref 13–17.7)
IMM GRANULOCYTES # BLD AUTO: 0.04 10*3/MM3 (ref 0–0.05)
IMM GRANULOCYTES NFR BLD AUTO: 0.4 % (ref 0–0.5)
IRON 24H UR-MRATE: 62 MCG/DL (ref 59–158)
IRON SATN MFR SERPL: 16 % (ref 20–50)
LYMPHOCYTES # BLD AUTO: 1.45 10*3/MM3 (ref 0.7–3.1)
LYMPHOCYTES NFR BLD AUTO: 16.1 % (ref 19.6–45.3)
MCH RBC QN AUTO: 28.8 PG (ref 26.6–33)
MCHC RBC AUTO-ENTMCNC: 32.6 G/DL (ref 31.5–35.7)
MCV RBC AUTO: 88.3 FL (ref 79–97)
MONOCYTES # BLD AUTO: 0.73 10*3/MM3 (ref 0.1–0.9)
MONOCYTES NFR BLD AUTO: 8.1 % (ref 5–12)
NEUTROPHILS NFR BLD AUTO: 6.24 10*3/MM3 (ref 1.7–7)
NEUTROPHILS NFR BLD AUTO: 69.4 % (ref 42.7–76)
NRBC BLD AUTO-RTO: 0 /100 WBC (ref 0–0.2)
PLATELET # BLD AUTO: 240 10*3/MM3 (ref 140–450)
PMV BLD AUTO: 10.7 FL (ref 6–12)
POTASSIUM SERPL-SCNC: 4.2 MMOL/L (ref 3.5–5.2)
PROT SERPL-MCNC: 7.3 G/DL (ref 6–8.5)
RBC # BLD AUTO: 3.68 10*6/MM3 (ref 4.14–5.8)
SODIUM SERPL-SCNC: 136 MMOL/L (ref 136–145)
TIBC SERPL-MCNC: 390 MCG/DL (ref 298–536)
TRANSFERRIN SERPL-MCNC: 262 MG/DL (ref 200–360)
WBC # BLD AUTO: 9 10*3/MM3 (ref 3.4–10.8)

## 2020-12-16 PROCEDURE — 99213 OFFICE O/P EST LOW 20 MIN: CPT | Performed by: CLINICAL NURSE SPECIALIST

## 2020-12-16 PROCEDURE — 80053 COMPREHEN METABOLIC PANEL: CPT | Performed by: CLINICAL NURSE SPECIALIST

## 2020-12-16 PROCEDURE — 84466 ASSAY OF TRANSFERRIN: CPT | Performed by: CLINICAL NURSE SPECIALIST

## 2020-12-16 PROCEDURE — 36415 COLL VENOUS BLD VENIPUNCTURE: CPT | Performed by: CLINICAL NURSE SPECIALIST

## 2020-12-16 PROCEDURE — 82728 ASSAY OF FERRITIN: CPT | Performed by: CLINICAL NURSE SPECIALIST

## 2020-12-16 PROCEDURE — 83540 ASSAY OF IRON: CPT | Performed by: CLINICAL NURSE SPECIALIST

## 2020-12-16 PROCEDURE — 85025 COMPLETE CBC W/AUTO DIFF WBC: CPT | Performed by: CLINICAL NURSE SPECIALIST

## 2020-12-16 NOTE — PROGRESS NOTES
Erick Luong  1955 12/16/2020  Chief Complaint   Patient presents with   • GI Problem     Here to discuss gastritis     Subjective   HPI  Erick Luong is a 65 y.o. male who presents with a complaint of gastritis with hemorrhage with his last Endsocopy 9/2020. He says that he does have some dark stool but not black tarry or sticky. He is unsure if blood but does not think so. He has occasional nausea. He has had intermittent vomiting a few weeks ago. No blood noted. He is taking his Protonix. No indigestion. No reflux or burning.    Past Medical History:   Diagnosis Date   • Arthritis    • CAD (coronary artery disease) 2/6/2017   • Coronary artery disease    • Diabetes mellitus (CMS/HCC)    • Gastroesophageal reflux disease 5/13/2019   • HTN (hypertension), benign 5/3/2017   • Hyperlipidemia    • Hypertension    • Mixed hyperlipidemia 2/7/2017   • Multiple lung nodules 1/26/2020    5mm, 9 mm RLL identified 1/2020, not present 10/2019.   • Myocardial infarction (CMS/HCC)    • Osteomyelitis (CMS/HCC) 1/22/2020   • Osteomyelitis of fifth toe of right foot (CMS/HCC) 10/7/2019   • Pancreatitis    • Persistent insomnia 1/20/2020   • Renal disorder    • Sleep apnea 2/6/2017   • Sleep apnea with use of continuous positive airway pressure (CPAP)    • Slow transit constipation 1/16/2019     Past Surgical History:   Procedure Laterality Date   • ABDOMINAL SURGERY     • APPENDECTOMY     • BACK SURGERY     • CARDIAC SURGERY     • CATARACT EXTRACTION     • CERVICAL SPINE SURGERY     • COLONOSCOPY N/A 1/31/2017    Normal exam repeat in 5 years   • COLONOSCOPY N/A 2/11/2019    Mild acute inflammation   • COLONOSCOPY W/ POLYPECTOMY  03/04/2014    Hyperplastic polyp   • CORONARY ARTERY BYPASS GRAFT  10/2015   • ENDOSCOPY  04/13/2011    Gastritis with hemorrhage   • ENDOSCOPY N/A 5/5/2017    Normal exam   • ENDOSCOPY N/A 2/11/2019    Gastritis   • ENDOSCOPY N/A 9/1/2020    Non-erosive gastritis with hemorrhage   • INCISION  "AND DRAINAGE OF WOUND Left 09/2007    spider bite       Outpatient Medications Marked as Taking for the 12/16/20 encounter (Office Visit) with Yuliana Vinson APRN   Medication Sig Dispense Refill   • aspirin 81 MG EC tablet Take 81 mg by mouth Daily.     • cyclobenzaprine (FLEXERIL) 5 MG tablet Take 5 mg by mouth 2 (Two) Times a Day As Needed for Muscle Spasms.     • donepezil (ARICEPT) 5 MG tablet Take 5 mg by mouth every night at bedtime.     • DULoxetine (CYMBALTA) 60 MG capsule Take 60 mg by mouth Daily.     • gabapentin (NEURONTIN) 300 MG capsule Take 300 mg by mouth 3 (Three) Times a Day.     • insulin glargine (LANTUS) 100 UNIT/ML injection Inject 20 Units under the skin into the appropriate area as directed Every Night.     • insulin regular (humuLIN R) 500 UNIT/ML CONCENTRATED injection Inject 100 Units under the skin 3 (Three) Times a Day Before Meals. Packaging states 100 units with regular meals; 120 units with large meals or 360 units daily     • lactulose (CHRONULAC) 10 GM/15ML solution Take 20 g by mouth 3 (Three) Times a Day As Needed (constipation).     • latanoprost (XALATAN) 0.005 % ophthalmic solution Administer 1 drop to both eyes Every Night.     • oxyCODONE-acetaminophen (PERCOCET)  MG per tablet Take 1 tablet by mouth 2 (Two) Times a Day.     • pantoprazole (PROTONIX) 40 MG EC tablet Take 1 tablet by mouth Daily. 90 tablet 3   • rosuvastatin (CRESTOR) 40 MG tablet Take 40 mg by mouth Every Night.     • tamsulosin (FLOMAX) 0.4 MG capsule 24 hr capsule Take 1 capsule by mouth Daily. 30 capsule 11   • traZODone (DESYREL) 100 MG tablet Take 100 mg by mouth At Night As Needed for Sleep.       Allergies   Allergen Reactions   • Bactrim [Sulfamethoxazole-Trimethoprim] Other (See Comments)     \"RENAL FAILURE\"   • Vancomycin Itching   • Clindamycin Unknown - Low Severity   • Zolpidem Tartrate Unknown - Low Severity   • Metronidazole Rash     Social History     Socioeconomic History   • " Marital status:      Spouse name: Not on file   • Number of children: Not on file   • Years of education: Not on file   • Highest education level: Not on file   Tobacco Use   • Smoking status: Never Smoker   • Smokeless tobacco: Never Used   • Tobacco comment: smoked in highschool   Substance and Sexual Activity   • Alcohol use: No   • Drug use: No   • Sexual activity: Defer     Family History   Problem Relation Age of Onset   • Colon cancer Father    • Heart disease Father    • Colon cancer Sister    • Colon polyps Sister    • Alzheimer's disease Mother    • Coronary artery disease Sister    • Coronary artery disease Sister      Health Maintenance   Topic Date Due   • Pneumococcal Vaccine 0-64 (1 of 1 - PPSV23) 06/18/1962   • TDAP/TD VACCINES (1 - Tdap) 06/18/1975   • ZOSTER VACCINE (1 of 2) 06/18/2006   • DIABETIC FOOT EXAM  03/29/2017   • DIABETIC EYE EXAM  03/29/2017   • ANNUAL WELLNESS VISIT  07/15/2020   • HEMOGLOBIN A1C  04/09/2021   • LIPID PANEL  10/09/2021   • URINE MICROALBUMIN  10/09/2021   • COLONOSCOPY  02/11/2029   • HEPATITIS C SCREENING  Completed   • INFLUENZA VACCINE  Completed     Review of Systems   Constitutional: Negative for activity change, appetite change, chills, diaphoresis, fatigue, fever and unexpected weight change.   HENT: Negative for ear pain, hearing loss, mouth sores, sore throat, trouble swallowing and voice change.    Eyes: Negative.    Respiratory: Negative for cough, choking, shortness of breath and wheezing.    Cardiovascular: Negative for chest pain and palpitations.   Gastrointestinal: Negative for abdominal pain, blood in stool, constipation, diarrhea, nausea and vomiting.   Endocrine: Negative for cold intolerance and heat intolerance.   Genitourinary: Negative for decreased urine volume, dysuria, frequency, hematuria and urgency.   Musculoskeletal: Negative for back pain, gait problem and myalgias.   Skin: Negative for color change, pallor and rash.  "  Allergic/Immunologic: Negative for food allergies and immunocompromised state.   Neurological: Negative for dizziness, tremors, seizures, syncope, weakness, light-headedness, numbness and headaches.   Hematological: Negative for adenopathy. Does not bruise/bleed easily.   Psychiatric/Behavioral: Negative for agitation and confusion. The patient is not nervous/anxious.    All other systems reviewed and are negative.    Objective   Vitals:    12/16/20 1242   BP: 132/78   Pulse: 88   Resp: 18   Temp: 98.4 °F (36.9 °C)   Weight: (!) 156 kg (343 lb)   Height: 182.9 cm (72\")     Body mass index is 46.52 kg/m².  Physical Exam  Constitutional:       Appearance: He is well-developed.   HENT:      Head: Normocephalic and atraumatic.   Eyes:      Pupils: Pupils are equal, round, and reactive to light.   Neck:      Musculoskeletal: Normal range of motion and neck supple.      Trachea: No tracheal deviation.   Cardiovascular:      Rate and Rhythm: Normal rate and regular rhythm.      Heart sounds: Normal heart sounds. No murmur. No friction rub. No gallop.    Pulmonary:      Effort: Pulmonary effort is normal. No respiratory distress.      Breath sounds: Normal breath sounds. No wheezing or rales.   Chest:      Chest wall: No tenderness.   Abdominal:      General: Bowel sounds are normal. There is no distension.      Palpations: Abdomen is soft. Abdomen is not rigid.      Tenderness: There is no abdominal tenderness. There is no guarding or rebound.   Musculoskeletal: Normal range of motion.         General: No tenderness or deformity.   Skin:     General: Skin is warm and dry.      Coloration: Skin is not pale.      Findings: No rash.   Neurological:      Mental Status: He is alert and oriented to person, place, and time.      Deep Tendon Reflexes: Reflexes are normal and symmetric.   Psychiatric:         Behavior: Behavior normal.         Thought Content: Thought content normal.         Judgment: Judgment normal. "       Assessment/Plan   Diagnoses and all orders for this visit:    1. Gastritis with hemorrhage, unspecified chronicity, unspecified gastritis type (Primary)  -     CBC & Differential  -     Comprehensive Metabolic Panel  -     Iron Profile  -     Ferritin    2. Nonsmoker    3. Obesity, unspecified obesity severity, unspecified obesity type    4. HTN (hypertension), benign    will recheck labs to see where is blood counts are.   PPI therapy to continue    Part of this note may be an electronic transcription/translation of spoken language to printed text using the Dragon Dictation System.  Body mass index is 46.52 kg/m².  No follow-ups on file.    Patient's Body mass index is 46.52 kg/m². BMI is above normal parameters. Recommendations include: nutrition counseling.      All risks, benefits, alternatives, and indications of colonoscopy and/or Endoscopy procedure have been discussed with the patient. Risks to include perforation of the colon requiring possible surgery or colostomy, risk of bleeding from biopsies or removal of colon tissue, possibility of missing a colon polyp or cancer, or adverse drug reaction.  Benefits to include the diagnosis and management of disease of the colon and rectum. Alternatives to include barium enema, radiographic evaluation, lab testing or no intervention. Pt verbalizes understanding and agrees.     Yuliana Vinson, APRN  12/16/2020  13:01 CST      Obesity, Adult  Obesity is the condition of having too much total body fat. Being overweight or obese means that your weight is greater than what is considered healthy for your body size. Obesity is determined by a measurement called BMI. BMI is an estimate of body fat and is calculated from height and weight. For adults, a BMI of 30 or higher is considered obese.  Obesity can eventually lead to other health concerns and major illnesses, including:  · Stroke.  · Coronary artery disease (CAD).  · Type 2 diabetes.  · Some types of  cancer, including cancers of the colon, breast, uterus, and gallbladder.  · Osteoarthritis.  · High blood pressure (hypertension).  · High cholesterol.  · Sleep apnea.  · Gallbladder stones.  · Infertility problems.  What are the causes?  The main cause of obesity is taking in (consuming) more calories than your body uses for energy. Other factors that contribute to this condition may include:  · Being born with genes that make you more likely to become obese.  · Having a medical condition that causes obesity. These conditions include:  ¨ Hypothyroidism.  ¨ Polycystic ovarian syndrome (PCOS).  ¨ Binge-eating disorder.  ¨ Cushing syndrome.  · Taking certain medicines, such as steroids, antidepressants, and seizure medicines.  · Not being physically active (sedentary lifestyle).  · Living where there are limited places to exercise safely or buy healthy foods.  · Not getting enough sleep.  What increases the risk?  The following factors may increase your risk of this condition:  · Having a family history of obesity.  · Being a woman of -American descent.  · Being a man of  descent.  What are the signs or symptoms?  Having excessive body fat is the main symptom of this condition.  How is this diagnosed?  This condition may be diagnosed based on:  · Your symptoms.  · Your medical history.  · A physical exam. Your health care provider may measure:  ¨ Your BMI. If you are an adult with a BMI between 25 and less than 30, you are considered overweight. If you are an adult with a BMI of 30 or higher, you are considered obese.  ¨ The distances around your hips and your waist (circumferences). These may be compared to each other to help diagnose your condition.  ¨ Your skinfold thickness. Your health care provider may gently pinch a fold of your skin and measure it.  How is this treated?  Treatment for this condition often includes changing your lifestyle. Treatment may include some or all of the  following:  · Dietary changes. Work with your health care provider and a dietitian to set a weight-loss goal that is healthy and reasonable for you. Dietary changes may include eating:  ¨ Smaller portions. A portion size is the amount of a particular food that is healthy for you to eat at one time. This varies from person to person.  ¨ Low-calorie or low-fat options.  ¨ More whole grains, fruits, and vegetables.  · Regular physical activity. This may include aerobic activity (cardio) and strength training.  · Medicine to help you lose weight. Your health care provider may prescribe medicine if you are unable to lose 1 pound a week after 6 weeks of eating more healthily and doing more physical activity.  · Surgery. Surgical options may include gastric banding and gastric bypass. Surgery may be done if:  ¨ Other treatments have not helped to improve your condition.  ¨ You have a BMI of 40 or higher.  ¨ You have life-threatening health problems related to obesity.  Follow these instructions at home:     Eating and drinking     · Follow recommendations from your health care provider about what you eat and drink. Your health care provider may advise you to:  ¨ Limit fast foods, sweets, and processed snack foods.  ¨ Choose low-fat options, such as low-fat milk instead of whole milk.  ¨ Eat 5 or more servings of fruits or vegetables every day.  ¨ Eat at home more often. This gives you more control over what you eat.  ¨ Choose healthy foods when you eat out.  ¨ Learn what a healthy portion size is.  ¨ Keep low-fat snacks on hand.  ¨ Avoid sugary drinks, such as soda, fruit juice, iced tea sweetened with sugar, and flavored milk.  ¨ Eat a healthy breakfast.  · Drink enough water to keep your urine clear or pale yellow.  · Do not go without eating for long periods of time (do not fast) or follow a fad diet. Fasting and fad diets can be unhealthy and even dangerous.  Physical Activity   · Exercise regularly, as told by your  health care provider. Ask your health care provider what types of exercise are safe for you and how often you should exercise.  · Warm up and stretch before being active.  · Cool down and stretch after being active.  · Rest between periods of activity.  Lifestyle   · Limit the time that you spend in front of your TV, computer, or video game system.  · Find ways to reward yourself that do not involve food.  · Limit alcohol intake to no more than 1 drink a day for nonpregnant women and 2 drinks a day for men. One drink equals 12 oz of beer, 5 oz of wine, or 1½ oz of hard liquor.  General instructions   · Keep a weight loss journal to keep track of the food you eat and how much you exercise you get.  · Take over-the-counter and prescription medicines only as told by your health care provider.  · Take vitamins and supplements only as told by your health care provider.  · Consider joining a support group. Your health care provider may be able to recommend a support group.  · Keep all follow-up visits as told by your health care provider. This is important.  Contact a health care provider if:  · You are unable to meet your weight loss goal after 6 weeks of dietary and lifestyle changes.  This information is not intended to replace advice given to you by your health care provider. Make sure you discuss any questions you have with your health care provider.  Document Released: 01/25/2006 Document Revised: 05/22/2017 Document Reviewed: 10/05/2016  [employer] Interactive Patient Education © 2017 [employer]      If you smoke or use tobacco, 4 minutes reading provided  Steps to Quit Smoking  Smoking tobacco can be harmful to your health and can affect almost every organ in your body. Smoking puts you, and those around you, at risk for developing many serious chronic diseases. Quitting smoking is difficult, but it is one of the best things that you can do for your health. It is never too late to quit.  What are the benefits of  quitting smoking?  When you quit smoking, you lower your risk of developing serious diseases and conditions, such as:  · Lung cancer or lung disease, such as COPD.  · Heart disease.  · Stroke.  · Heart attack.  · Infertility.  · Osteoporosis and bone fractures.  Additionally, symptoms such as coughing, wheezing, and shortness of breath may get better when you quit. You may also find that you get sick less often because your body is stronger at fighting off colds and infections. If you are pregnant, quitting smoking can help to reduce your chances of having a baby of low birth weight.  How do I get ready to quit?  When you decide to quit smoking, create a plan to make sure that you are successful. Before you quit:  · Pick a date to quit. Set a date within the next two weeks to give you time to prepare.  · Write down the reasons why you are quitting. Keep this list in places where you will see it often, such as on your bathroom mirror or in your car or wallet.  · Identify the people, places, things, and activities that make you want to smoke (triggers) and avoid them. Make sure to take these actions:  ¨ Throw away all cigarettes at home, at work, and in your car.  ¨ Throw away smoking accessories, such as ashtrays and lighters.  ¨ Clean your car and make sure to empty the ashtray.  ¨ Clean your home, including curtains and carpets.  · Tell your family, friends, and coworkers that you are quitting. Support from your loved ones can make quitting easier.  · Talk with your health care provider about your options for quitting smoking.  · Find out what treatment options are covered by your health insurance.  What strategies can I use to quit smoking?  Talk with your healthcare provider about different strategies to quit smoking. Some strategies include:  · Quitting smoking altogether instead of gradually lessening how much you smoke over a period of time. Research shows that quitting “cold turkey” is more successful than  gradually quitting.  · Attending in-person counseling to help you build problem-solving skills. You are more likely to have success in quitting if you attend several counseling sessions. Even short sessions of 10 minutes can be effective.  · Finding resources and support systems that can help you to quit smoking and remain smoke-free after you quit. These resources are most helpful when you use them often. They can include:  ¨ Online chats with a counselor.  ¨ Telephone quitlines.  ¨ Printed self-help materials.  ¨ Support groups or group counseling.  ¨ Text messaging programs.  ¨ Mobile phone applications.  · Taking medicines to help you quit smoking. (If you are pregnant or breastfeeding, talk with your health care provider first.) Some medicines contain nicotine and some do not. Both types of medicines help with cravings, but the medicines that include nicotine help to relieve withdrawal symptoms. Your health care provider may recommend:  ¨ Nicotine patches, gum, or lozenges.  ¨ Nicotine inhalers or sprays.  ¨ Non-nicotine medicine that is taken by mouth.  Talk with your health care provider about combining strategies, such as taking medicines while you are also receiving in-person counseling. Using these two strategies together makes you more likely to succeed in quitting than if you used either strategy on its own.  If you are pregnant or breastfeeding, talk with your health care provider about finding counseling or other support strategies to quit smoking. Do not take medicine to help you quit smoking unless told to do so by your health care provider.  What things can I do to make it easier to quit?  Quitting smoking might feel overwhelming at first, but there is a lot that you can do to make it easier. Take these important actions:  · Reach out to your family and friends and ask that they support and encourage you during this time. Call telephone quitlines, reach out to support groups, or work with a  counselor for support.  · Ask people who smoke to avoid smoking around you.  · Avoid places that trigger you to smoke, such as bars, parties, or smoke-break areas at work.  · Spend time around people who do not smoke.  · Lessen stress in your life, because stress can be a smoking trigger for some people. To lessen stress, try:  ¨ Exercising regularly.  ¨ Deep-breathing exercises.  ¨ Yoga.  ¨ Meditating.  ¨ Performing a body scan. This involves closing your eyes, scanning your body from head to toe, and noticing which parts of your body are particularly tense. Purposefully relax the muscles in those areas.  · Download or purchase mobile phone or tablet apps (applications) that can help you stick to your quit plan by providing reminders, tips, and encouragement. There are many free apps, such as QuitGuide from the CDC (Centers for Disease Control and Prevention). You can find other support for quitting smoking (smoking cessation) through smokefree.gov and other websites.  How will I feel when I quit smoking?  Within the first 24 hours of quitting smoking, you may start to feel some withdrawal symptoms. These symptoms are usually most noticeable 2-3 days after quitting, but they usually do not last beyond 2-3 weeks. Changes or symptoms that you might experience include:  · Mood swings.  · Restlessness, anxiety, or irritation.  · Difficulty concentrating.  · Dizziness.  · Strong cravings for sugary foods in addition to nicotine.  · Mild weight gain.  · Constipation.  · Nausea.  · Coughing or a sore throat.  · Changes in how your medicines work in your body.  · A depressed mood.  · Difficulty sleeping (insomnia).  After the first 2-3 weeks of quitting, you may start to notice more positive results, such as:  · Improved sense of smell and taste.  · Decreased coughing and sore throat.  · Slower heart rate.  · Lower blood pressure.  · Clearer skin.  · The ability to breathe more easily.  · Fewer sick days.  Quitting smoking  is very challenging for most people. Do not get discouraged if you are not successful the first time. Some people need to make many attempts to quit before they achieve long-term success. Do your best to stick to your quit plan, and talk with your health care provider if you have any questions or concerns.  This information is not intended to replace advice given to you by your health care provider. Make sure you discuss any questions you have with your health care provider.  Document Released: 12/12/2002 Document Revised: 08/15/2017 Document Reviewed: 05/03/2016  [employer] Interactive Patient Education © 2017 [employer]

## 2021-01-18 ENCOUNTER — OFFICE VISIT (OUTPATIENT)
Dept: ENDOCRINOLOGY | Facility: CLINIC | Age: 66
End: 2021-01-18

## 2021-01-18 VITALS
DIASTOLIC BLOOD PRESSURE: 58 MMHG | BODY MASS INDEX: 42.66 KG/M2 | HEIGHT: 72 IN | WEIGHT: 315 LBS | HEART RATE: 95 BPM | OXYGEN SATURATION: 98 % | SYSTOLIC BLOOD PRESSURE: 122 MMHG

## 2021-01-18 DIAGNOSIS — Z79.4 TYPE 2 DIABETES MELLITUS WITH HYPERGLYCEMIA, WITH LONG-TERM CURRENT USE OF INSULIN (HCC): Primary | ICD-10-CM

## 2021-01-18 DIAGNOSIS — I10 HTN (HYPERTENSION), BENIGN: ICD-10-CM

## 2021-01-18 DIAGNOSIS — E11.65 TYPE 2 DIABETES MELLITUS WITH HYPERGLYCEMIA, WITH LONG-TERM CURRENT USE OF INSULIN (HCC): Primary | ICD-10-CM

## 2021-01-18 DIAGNOSIS — E11.42 DIABETIC POLYNEUROPATHY ASSOCIATED WITH TYPE 2 DIABETES MELLITUS (HCC): ICD-10-CM

## 2021-01-18 DIAGNOSIS — E66.01 MORBID OBESITY WITH BMI OF 45.0-49.9, ADULT (HCC): ICD-10-CM

## 2021-01-18 PROCEDURE — 95250 CONT GLUC MNTR PHYS/QHP EQP: CPT | Performed by: NURSE PRACTITIONER

## 2021-01-18 PROCEDURE — 99214 OFFICE O/P EST MOD 30 MIN: CPT | Performed by: NURSE PRACTITIONER

## 2021-01-18 NOTE — PATIENT INSTRUCTIONS
Taking lantus 100 units daily ---stop    Taking Humulin U 500 pen  --- takes 25 units at night --stop      Change to 80 units 30 minutes before breakfast       And take 60 units 30 minutes before supper      Start Trulicity 0.75 mg once weekly    Side effects can be nausea    If nauseated while eating, eat less,     If vomiting or abdominal pain stop medication and call office

## 2021-01-18 NOTE — PROGRESS NOTES
"Chief Complaint  Diabetes    Subjective          Erick Luong presents to [facility] ENDOCRINOLOGY for   History of Present Illness     IN OFFICE VISIT       Referring provider     Del Shetty MD     65 Year old male presents for follow up         Reason -- diabetes mellitus     Duration -- diagnosed at age 24 years    Timing constant     Quality not controlled     Severity high     Complications --he has a foot ulcer and is currently following with podiatry    Macrovascular complication -  CAD, CABG times 4     Microvascular complications --neuropathy, CKD stage IV       Current regimen --- insulin       Current glucose monitoring device -- fingerstick's     Blood glucose readings     Checks 4 times daily     Variable from 40 up to 300     Lows in the night           Objective   Vital Signs:   /58   Pulse 95   Ht 182.9 cm (72\")   Wt (!) 159 kg (349 lb 8 oz)   SpO2 98%   BMI 47.40 kg/m²     Physical Exam  Constitutional:       Appearance: Normal appearance.   HENT:      Head: Normocephalic and atraumatic.      Right Ear: External ear normal.      Left Ear: External ear normal.   Eyes:      Conjunctiva/sclera: Conjunctivae normal.      Pupils: Pupils are equal, round, and reactive to light.   Neck:      Musculoskeletal: Normal range of motion.   Cardiovascular:      Rate and Rhythm: Normal rate and regular rhythm.      Heart sounds: Normal heart sounds.   Pulmonary:      Effort: Pulmonary effort is normal.      Breath sounds: Normal breath sounds.   Musculoskeletal: Normal range of motion.   Skin:     Coloration: Skin is not pale.   Neurological:      General: No focal deficit present.      Mental Status: He is alert and oriented to person, place, and time.   Psychiatric:         Mood and Affect: Mood normal.         Thought Content: Thought content normal.         Judgment: Judgment normal.        Result Review :   The following data was reviewed by: SUSAN Farah on " 01/18/2021:  CMP    CMP 9/2/20 10/9/20 12/16/20   BUN 37 (A) 63 (A) 56 (A)   Creatinine 1.81 (A) 2.53 (A) 2.19 (A)   eGFR Non  Am 38 (A) 26 (A) 30 (A)   Sodium 139 137 136   Potassium 4.8 5.1 4.2   Chloride 101 98 96 (A)   Calcium 8.7 9.3 9.2   Albumin  4.00 4.40   Total Bilirubin  0.3 0.3   Alkaline Phosphatase  74 77   AST (SGOT)  15 16   ALT (SGPT)  13 13   (A) Abnormal value            CBC    CBC 9/2/20 10/9/20 12/16/20   WBC  6.50 9.00   RBC  3.18 (A) 3.68 (A)   Hemoglobin 9.4 (A) 9.3 (A) 10.6 (A)   Hematocrit 28.6 (A) 29.0 (A) 32.5 (A)   MCV  91.2 88.3   MCH  29.2 28.8   MCHC  32.1 32.6   RDW  12.9 14.1   Platelets  232 240   (A) Abnormal value            Lipid Panel    Lipid Panel 10/9/20   Triglycerides 138   HDL Cholesterol 36 (A)   VLDL Cholesterol 24   LDL Cholesterol  53   LDL/HDL Ratio 1.37   (A) Abnormal value            TSH    TSH 10/9/20   TSH 1.490           A1C Last 3 Results    HGBA1C Last 3 Results 3/11/20 8/31/20 10/9/20   Hemoglobin A1C 9.24 (A) 10.90 (A) 8.80 (A)   (A) Abnormal value            Microalbumin    Microalbumin 10/9/20   Microalbumin, Urine 2.4                     Assessment and Plan    Problem List Items Addressed This Visit        Other    HTN (hypertension), benign    Type 2 diabetes mellitus with hyperglycemia, with long-term current use of insulin (CMS/[facility]) - Primary    Relevant Medications    Dulaglutide 0.75 MG/0.5ML solution pen-injector    Morbid obesity with BMI of 45.0-49.9, adult (CMS/[facility])    Diabetic polyneuropathy associated with type 2 diabetes mellitus (CMS/[facility])    Relevant Medications    Dulaglutide 0.75 MG/0.5ML solution pen-injector            Glycemic management:     Diabetes mellitus type 2          Taking lantus 100 units daily ---stop    Taking Humulin U 500 pen  --- takes 25 units at night --stop      Change to 80 units 30 minutes before breakfast       And take 60 units 30 minutes before supper      Start Trulicity 0.75 mg once weekly    Side effects  can be nausea    If nauseated while eating, eat less,     If vomiting or abdominal pain stop medication and call office     We started patient on dirk 2 system in office, demonstrated how to use, insert, and change every 14 days     We explained the arrows and our interpret the data     Approve for Dirk Personal Use        #1  Patient has diabetes mellitus, insulin-dependent.     #2 She performs blood glucose testing 4 times daily and blood glucose log was brought to office with variability from .     #3  She is requiring  Basal insulin  and Prandial Insulin 3 times daily for a total of 4 injections per day.     #4 Based on blood glucose readings we are making adjustments.      #5 I have personally seen patient within the past 6 months     #6 We plan on seeing her every 2-3 months for continuous adjustment of her diabetes regimen      #7 patient has hypoglycemia with unawareness.     #8 patient has day-to-day variation in her mealtime which confounds the degree of insulin dosing with multiple daily injections.     #9 patient has completed diabetes education program with us.     #10 she has demonstrated the ability to self monitor her glucose.         #11 Patient is motivated in improving  diabetes control          Hyperlipidemia     Crestor 40 mg one at night     Blood pressure management:    Hypertension     Taking bumex 1mg       Microvascular complications    Neuropathy    Taking gabapentin 300 mg tid     Last eye exam -- 2020,    Cataract surgery       Weight management    Patient's Body mass index is 47.4 kg/m². BMI is above normal parameters. Recommendations include: nutrition counseling.      Decrease caloric intake by 500 calories    Records from Dr. Shetty received and reviewed from 2020    Thank you for this consultation      Follow Up   Return in about 2 weeks (around 2/1/2021) for Recheck.  Patient was given instructions and counseling regarding his condition or for health maintenance advice.  Please see specific information pulled into the AVS if appropriate.

## 2021-01-19 ENCOUNTER — TELEPHONE (OUTPATIENT)
Dept: ENDOCRINOLOGY | Facility: CLINIC | Age: 66
End: 2021-01-19

## 2021-01-19 NOTE — TELEPHONE ENCOUNTER
Pt's wife called to let our office know that the breana that was placed yesterday stopped working. I advised them to contact the company, she wanted me to let you know just in case.

## 2021-01-27 ENCOUNTER — TELEPHONE (OUTPATIENT)
Dept: ENDOCRINOLOGY | Facility: CLINIC | Age: 66
End: 2021-01-27

## 2021-01-27 RX ORDER — INSULIN ASPART 100 [IU]/ML
INJECTION, SOLUTION INTRAVENOUS; SUBCUTANEOUS
Qty: 2 PEN | Refills: 11 | Status: SHIPPED | OUTPATIENT
Start: 2021-01-27 | End: 2021-02-02

## 2021-01-27 NOTE — TELEPHONE ENCOUNTER
Pt's wife called stating that he is having issues with his insulin and needing someone to discuss that with them.

## 2021-01-28 RX ORDER — INSULIN LISPRO 100 [IU]/ML
INJECTION, SOLUTION INTRAVENOUS; SUBCUTANEOUS
Qty: 18 ML | Refills: 11 | Status: ON HOLD | OUTPATIENT
Start: 2021-01-28 | End: 2022-08-06

## 2021-01-28 NOTE — TELEPHONE ENCOUNTER
Pt's wife called to give the glucose readings and additional information, she says that [facility] pharmacy would not fill the Novolog last night, says the insurance won't cover it. She talked to the  pharmacy, they mentioned switching this to Humalog. His sugars were 528 starting last night, so he took Humalin, and it went down to 270, at 10 pm. She states that it has stayed 300 this morning, and throughout the night. The pharmacist in [place], which she says is the  pharmacy,  wanted to know if we wanted to raise his Trulicity. If you need to call for clarification of any information, the patients wife's cell phone number is,  [phone], her name is Joan.

## 2021-02-02 ENCOUNTER — OFFICE VISIT (OUTPATIENT)
Dept: ENDOCRINOLOGY | Facility: CLINIC | Age: 66
End: 2021-02-02

## 2021-02-02 VITALS
WEIGHT: 315 LBS | SYSTOLIC BLOOD PRESSURE: 98 MMHG | HEIGHT: 72 IN | OXYGEN SATURATION: 98 % | BODY MASS INDEX: 42.66 KG/M2 | HEART RATE: 91 BPM | DIASTOLIC BLOOD PRESSURE: 62 MMHG

## 2021-02-02 DIAGNOSIS — E11.65 TYPE 2 DIABETES MELLITUS WITH HYPERGLYCEMIA, WITH LONG-TERM CURRENT USE OF INSULIN (HCC): Primary | ICD-10-CM

## 2021-02-02 DIAGNOSIS — E66.01 MORBID OBESITY WITH BMI OF 45.0-49.9, ADULT (HCC): ICD-10-CM

## 2021-02-02 DIAGNOSIS — Z79.4 TYPE 2 DIABETES MELLITUS WITH HYPERGLYCEMIA, WITH LONG-TERM CURRENT USE OF INSULIN (HCC): Primary | ICD-10-CM

## 2021-02-02 DIAGNOSIS — E11.42 DIABETIC POLYNEUROPATHY ASSOCIATED WITH TYPE 2 DIABETES MELLITUS (HCC): ICD-10-CM

## 2021-02-02 LAB — GLUCOSE BLDC GLUCOMTR-MCNC: 177 MG/DL (ref 70–130)

## 2021-02-02 PROCEDURE — 82947 ASSAY GLUCOSE BLOOD QUANT: CPT | Performed by: NURSE PRACTITIONER

## 2021-02-02 PROCEDURE — 99214 OFFICE O/P EST MOD 30 MIN: CPT | Performed by: NURSE PRACTITIONER

## 2021-02-02 NOTE — PROGRESS NOTES
"Chief Complaint  Diabetes    Subjective          Erick Luong presents to [facility] ENDOCRINOLOGY for   History of Present Illness     IN OFFICE VISIT       Referring provider      Del Shetty MD      65 Year old male presents for follow up            Reason -- diabetes mellitus      Duration -- diagnosed at age 24 years     Timing constant      Quality not controlled      Severity high      Complications --he has a foot ulcer and is currently following with podiatry     Macrovascular complication -  CAD, CABG times 4      Microvascular complications --neuropathy, CKD stage IV         Current regimen --- insulin         Current glucose monitoring device -- fingerstick's      Blood glucose readings      Checks 4 times daily     Has the breana 2 but no sensors      200 up to 400     In office 177            Objective   Vital Signs:   BP 98/62   Pulse 91   Ht 182.9 cm (72\")   Wt (!) 154 kg (339 lb 11.2 oz)   SpO2 98%   BMI 46.07 kg/m²     Physical Exam  Constitutional:       Appearance: Normal appearance.   HENT:      Head: Normocephalic.      Right Ear: External ear normal.      Left Ear: External ear normal.      Nose: Nose normal.   Eyes:      Conjunctiva/sclera: Conjunctivae normal.      Pupils: Pupils are equal, round, and reactive to light.   Neck:      Musculoskeletal: Normal range of motion and neck supple.   Cardiovascular:      Rate and Rhythm: Regular rhythm.      Heart sounds: Normal heart sounds.   Pulmonary:      Breath sounds: Normal breath sounds.   Musculoskeletal: Normal range of motion.   Skin:     Coloration: Skin is not pale.   Neurological:      General: No focal deficit present.      Mental Status: He is alert.   Psychiatric:         Mood and Affect: Mood normal.         Thought Content: Thought content normal.         Judgment: Judgment normal.        Result Review :{ Labs  Result Review  Imaging  Med Tab  Media :23}                 Assessment and Plan    Problem " List Items Addressed This Visit        Other    Type 2 diabetes mellitus with hyperglycemia, with long-term current use of insulin (CMS/[facility]) - Primary    Relevant Medications    Dulaglutide 0.75 MG/0.5ML solution pen-injector    Insulin Regular Human 4 & 8 & 12 units powder    Other Relevant Orders    POC Glucose Fingerstick    Morbid obesity with BMI of 45.0-49.9, adult (CMS/[facility])    Diabetic polyneuropathy associated with type 2 diabetes mellitus (CMS/[facility])    Relevant Medications    Dulaglutide 0.75 MG/0.5ML solution pen-injector    Insulin Regular Human 4 & 8 & 12 units powder         Glycemic management:      Diabetes mellitus type 2              Taking lantus 100 units daily ---stop     Taking Humulin U 500 pen  --- takes 25 units at night --stop      Humulin U 500          taking 80 units 30 minutes before breakfast ---increase to 90 units          take 60 units 30 minutes before supper --- increase  to 65 units ---    If you have a low bg in the night decrease to 55 unit          Trulicity 0.75 mg once weekly--continue      Side effects can be nausea     If nauseated while eating, eat less,      If vomiting or abdominal pain stop medication and call office          Use the humalog for rescue    Will try to approve Afrezza to use with each meal  ( inhaled insulin)       The dirk fell off from last visit      Approve for Dirk 2  Personal Use           #1  Patient has diabetes mellitus, insulin-dependent.     #2 She performs blood glucose testing 4 times daily and blood glucose log was brought to office with variability from .     #3  She is requiring  Basal insulin  and Prandial Insulin 3 times daily for a total of 4 injections per day.     #4 Based on blood glucose readings we are making adjustments.      #5 I have personally seen patient within the past 6 months     #6 We plan on seeing her every 2-3 months for continuous adjustment of her diabetes regimen      #7 patient has hypoglycemia with  unawareness.     #8 patient has day-to-day variation in her mealtime which confounds the degree of insulin dosing with multiple daily injections.     #9 patient has completed diabetes education program with us.     #10 she has demonstrated the ability to self monitor her glucose.         #11 Patient is motivated in improving  diabetes control            Hyperlipidemia      Crestor 40 mg one at night      Blood pressure management:     Hypertension      Taking bumex 1mg         Microvascular complications     Neuropathy     Taking gabapentin 300 mg tid      Last eye exam -- 2020,     Cataract surgery         Weight management     Patient's Body mass index is 46.07 kg/m². BMI is above normal parameters. Recommendations include: nutrition counseling.       Decrease caloric intake by 500 calories         Follow Up   Return in about 4 weeks (around 3/2/2021).  Patient was given instructions and counseling regarding his condition or for health maintenance advice. Please see specific information pulled into the AVS if appropriate.

## 2021-02-02 NOTE — PATIENT INSTRUCTIONS
Humulin U 500          taking 80 units 30 minutes before breakfast ---increase to 90 units          take 60 units 30 minutes before supper --- increase  to 65 units ---    If you have a low bg in the night decrease to 55 unit          Trulicity 0.75 mg once weekly--continue      Side effects can be nausea     If nauseated while eating, eat less,      If vomiting or abdominal pain stop medication and call office          Use the humalog for rescue    Will try to approve Afrezza to use with each meal  ( inhaled insulin)

## 2021-02-03 ENCOUNTER — TRANSCRIBE ORDERS (OUTPATIENT)
Dept: ADMINISTRATIVE | Facility: HOSPITAL | Age: 66
End: 2021-02-03

## 2021-02-03 DIAGNOSIS — I10 ESSENTIAL (PRIMARY) HYPERTENSION: ICD-10-CM

## 2021-02-03 DIAGNOSIS — E78.5 HYPERLIPIDEMIA, UNSPECIFIED HYPERLIPIDEMIA TYPE: ICD-10-CM

## 2021-02-03 DIAGNOSIS — E11.42 TYPE 2 DIABETES MELLITUS WITH DIABETIC POLYNEUROPATHY, UNSPECIFIED WHETHER LONG TERM INSULIN USE (HCC): Primary | ICD-10-CM

## 2021-02-04 ENCOUNTER — DOCUMENTATION (OUTPATIENT)
Dept: ENDOCRINOLOGY | Facility: CLINIC | Age: 66
End: 2021-02-04

## 2021-02-04 NOTE — PROGRESS NOTES
PA FOR CHEMO 2 SENSORS SENT VIA [employer]. KEY [id number]  Denied - stated try Dexcom G6 first.

## 2021-02-05 ENCOUNTER — APPOINTMENT (OUTPATIENT)
Dept: GENERAL RADIOLOGY | Facility: HOSPITAL | Age: 66
End: 2021-02-05

## 2021-02-05 ENCOUNTER — HOSPITAL ENCOUNTER (OUTPATIENT)
Facility: HOSPITAL | Age: 66
Discharge: HOME | End: 2021-02-10
Attending: EMERGENCY MEDICINE | Admitting: INTERNAL MEDICINE

## 2021-02-05 ENCOUNTER — TRANSCRIBE ORDERS (OUTPATIENT)
Dept: ADMINISTRATIVE | Facility: HOSPITAL | Age: 66
End: 2021-02-05

## 2021-02-05 DIAGNOSIS — Z95.1 HX OF CABG: ICD-10-CM

## 2021-02-05 DIAGNOSIS — R07.9 CHEST PAIN, UNSPECIFIED TYPE: Primary | ICD-10-CM

## 2021-02-05 DIAGNOSIS — I20.9 ISCHEMIC CHEST PAIN (HCC): Primary | ICD-10-CM

## 2021-02-05 DIAGNOSIS — Z79.4 TYPE 2 DIABETES MELLITUS WITH HYPERGLYCEMIA, WITH LONG-TERM CURRENT USE OF INSULIN (HCC): ICD-10-CM

## 2021-02-05 DIAGNOSIS — R11.2 NAUSEA AND VOMITING, INTRACTABILITY OF VOMITING NOT SPECIFIED, UNSPECIFIED VOMITING TYPE: ICD-10-CM

## 2021-02-05 DIAGNOSIS — E11.65 TYPE 2 DIABETES MELLITUS WITH HYPERGLYCEMIA, WITH LONG-TERM CURRENT USE OF INSULIN (HCC): ICD-10-CM

## 2021-02-05 LAB
ANION GAP SERPL CALCULATED.3IONS-SCNC: 12 MMOL/L (ref 5–15)
APTT PPP: 25.4 SECONDS (ref 24.1–35)
BASOPHILS # BLD AUTO: 0.04 10*3/MM3 (ref 0–0.2)
BASOPHILS NFR BLD AUTO: 0.4 % (ref 0–1.5)
BUN SERPL-MCNC: 47 MG/DL (ref 8–23)
BUN/CREAT SERPL: 19.7 (ref 7–25)
CALCIUM SPEC-SCNC: 9.3 MG/DL (ref 8.6–10.5)
CHLORIDE SERPL-SCNC: 100 MMOL/L (ref 98–107)
CO2 SERPL-SCNC: 25 MMOL/L (ref 22–29)
CREAT SERPL-MCNC: 2.39 MG/DL (ref 0.76–1.27)
D DIMER PPP FEU-MCNC: 0.55 MG/L (FEU) (ref 0–0.5)
DEPRECATED RDW RBC AUTO: 43.3 FL (ref 37–54)
EOSINOPHIL # BLD AUTO: 0.41 10*3/MM3 (ref 0–0.4)
EOSINOPHIL NFR BLD AUTO: 4.3 % (ref 0.3–6.2)
ERYTHROCYTE [DISTWIDTH] IN BLOOD BY AUTOMATED COUNT: 14 % (ref 12.3–15.4)
GFR SERPL CREATININE-BSD FRML MDRD: 28 ML/MIN/1.73
GLUCOSE BLDC GLUCOMTR-MCNC: 228 MG/DL (ref 70–130)
GLUCOSE SERPL-MCNC: 299 MG/DL (ref 65–99)
HCT VFR BLD AUTO: 30 % (ref 37.5–51)
HGB BLD-MCNC: 10.4 G/DL (ref 13–17.7)
HOLD SPECIMEN: NORMAL
HOLD SPECIMEN: NORMAL
IMM GRANULOCYTES # BLD AUTO: 0.05 10*3/MM3 (ref 0–0.05)
IMM GRANULOCYTES NFR BLD AUTO: 0.5 % (ref 0–0.5)
INR PPP: 0.98 (ref 0.91–1.09)
LYMPHOCYTES # BLD AUTO: 1.48 10*3/MM3 (ref 0.7–3.1)
LYMPHOCYTES NFR BLD AUTO: 15.5 % (ref 19.6–45.3)
MCH RBC QN AUTO: 29.5 PG (ref 26.6–33)
MCHC RBC AUTO-ENTMCNC: 34.7 G/DL (ref 31.5–35.7)
MCV RBC AUTO: 85.2 FL (ref 79–97)
MONOCYTES # BLD AUTO: 0.72 10*3/MM3 (ref 0.1–0.9)
MONOCYTES NFR BLD AUTO: 7.5 % (ref 5–12)
NEUTROPHILS NFR BLD AUTO: 6.87 10*3/MM3 (ref 1.7–7)
NEUTROPHILS NFR BLD AUTO: 71.8 % (ref 42.7–76)
NRBC BLD AUTO-RTO: 0 /100 WBC (ref 0–0.2)
NT-PROBNP SERPL-MCNC: 452.9 PG/ML (ref 0–900)
PLATELET # BLD AUTO: 223 10*3/MM3 (ref 140–450)
PMV BLD AUTO: 11.1 FL (ref 6–12)
POTASSIUM SERPL-SCNC: 4.5 MMOL/L (ref 3.5–5.2)
PROTHROMBIN TIME: 12.6 SECONDS (ref 11.9–14.6)
RBC # BLD AUTO: 3.52 10*6/MM3 (ref 4.14–5.8)
SARS-COV-2 RDRP RESP QL NAA+PROBE: NORMAL
SODIUM SERPL-SCNC: 137 MMOL/L (ref 136–145)
TROPONIN T SERPL-MCNC: <0.01 NG/ML (ref 0–0.03)
WBC # BLD AUTO: 9.57 10*3/MM3 (ref 3.4–10.8)
WHOLE BLOOD HOLD SPECIMEN: NORMAL
WHOLE BLOOD HOLD SPECIMEN: NORMAL

## 2021-02-05 PROCEDURE — 96374 THER/PROPH/DIAG INJ IV PUSH: CPT

## 2021-02-05 PROCEDURE — 83880 ASSAY OF NATRIURETIC PEPTIDE: CPT | Performed by: EMERGENCY MEDICINE

## 2021-02-05 PROCEDURE — 87635 SARS-COV-2 COVID-19 AMP PRB: CPT | Performed by: EMERGENCY MEDICINE

## 2021-02-05 PROCEDURE — 96375 TX/PRO/DX INJ NEW DRUG ADDON: CPT

## 2021-02-05 PROCEDURE — 25010000002 MORPHINE PER 10 MG: Performed by: EMERGENCY MEDICINE

## 2021-02-05 PROCEDURE — C9803 HOPD COVID-19 SPEC COLLECT: HCPCS

## 2021-02-05 PROCEDURE — 85730 THROMBOPLASTIN TIME PARTIAL: CPT | Performed by: EMERGENCY MEDICINE

## 2021-02-05 PROCEDURE — 93005 ELECTROCARDIOGRAM TRACING: CPT | Performed by: EMERGENCY MEDICINE

## 2021-02-05 PROCEDURE — 84484 ASSAY OF TROPONIN QUANT: CPT | Performed by: EMERGENCY MEDICINE

## 2021-02-05 PROCEDURE — 85610 PROTHROMBIN TIME: CPT | Performed by: EMERGENCY MEDICINE

## 2021-02-05 PROCEDURE — G0378 HOSPITAL OBSERVATION PER HR: HCPCS

## 2021-02-05 PROCEDURE — 99284 EMERGENCY DEPT VISIT MOD MDM: CPT

## 2021-02-05 PROCEDURE — 85025 COMPLETE CBC W/AUTO DIFF WBC: CPT | Performed by: EMERGENCY MEDICINE

## 2021-02-05 PROCEDURE — 84484 ASSAY OF TROPONIN QUANT: CPT | Performed by: INTERNAL MEDICINE

## 2021-02-05 PROCEDURE — 93010 ELECTROCARDIOGRAM REPORT: CPT | Performed by: INTERNAL MEDICINE

## 2021-02-05 PROCEDURE — 71045 X-RAY EXAM CHEST 1 VIEW: CPT

## 2021-02-05 PROCEDURE — 85379 FIBRIN DEGRADATION QUANT: CPT | Performed by: EMERGENCY MEDICINE

## 2021-02-05 PROCEDURE — 80048 BASIC METABOLIC PNL TOTAL CA: CPT | Performed by: EMERGENCY MEDICINE

## 2021-02-05 PROCEDURE — 82962 GLUCOSE BLOOD TEST: CPT

## 2021-02-05 PROCEDURE — 25010000002 ONDANSETRON PER 1 MG: Performed by: EMERGENCY MEDICINE

## 2021-02-05 RX ORDER — ONDANSETRON 2 MG/ML
4 INJECTION INTRAMUSCULAR; INTRAVENOUS ONCE
Status: COMPLETED | OUTPATIENT
Start: 2021-02-05 | End: 2021-02-05

## 2021-02-05 RX ORDER — MIDODRINE HYDROCHLORIDE 2.5 MG/1
10 TABLET ORAL
Status: DISCONTINUED | OUTPATIENT
Start: 2021-02-06 | End: 2021-02-06

## 2021-02-05 RX ORDER — SODIUM CHLORIDE 0.9 % (FLUSH) 0.9 %
10 SYRINGE (ML) INJECTION AS NEEDED
Status: DISCONTINUED | OUTPATIENT
Start: 2021-02-05 | End: 2021-02-10 | Stop reason: HOSPADM

## 2021-02-05 RX ORDER — DULOXETIN HYDROCHLORIDE 30 MG/1
60 CAPSULE, DELAYED RELEASE ORAL DAILY
Status: DISCONTINUED | OUTPATIENT
Start: 2021-02-06 | End: 2021-02-10 | Stop reason: HOSPADM

## 2021-02-05 RX ORDER — ROSUVASTATIN CALCIUM 20 MG/1
40 TABLET, COATED ORAL NIGHTLY
Status: DISCONTINUED | OUTPATIENT
Start: 2021-02-05 | End: 2021-02-10 | Stop reason: HOSPADM

## 2021-02-05 RX ORDER — TRAZODONE HYDROCHLORIDE 100 MG/1
100 TABLET ORAL NIGHTLY
Status: DISCONTINUED | OUTPATIENT
Start: 2021-02-05 | End: 2021-02-10 | Stop reason: HOSPADM

## 2021-02-05 RX ORDER — GABAPENTIN 300 MG/1
300 CAPSULE ORAL 3 TIMES DAILY
Status: DISCONTINUED | OUTPATIENT
Start: 2021-02-05 | End: 2021-02-10 | Stop reason: HOSPADM

## 2021-02-05 RX ORDER — ASPIRIN 81 MG/1
81 TABLET ORAL DAILY
Status: DISCONTINUED | OUTPATIENT
Start: 2021-02-06 | End: 2021-02-10 | Stop reason: HOSPADM

## 2021-02-05 RX ORDER — ASPIRIN 81 MG/1
324 TABLET, CHEWABLE ORAL ONCE
Status: COMPLETED | OUTPATIENT
Start: 2021-02-05 | End: 2021-02-05

## 2021-02-05 RX ORDER — NICOTINE POLACRILEX 4 MG
15 LOZENGE BUCCAL
Status: DISCONTINUED | OUTPATIENT
Start: 2021-02-05 | End: 2021-02-10 | Stop reason: HOSPADM

## 2021-02-05 RX ORDER — PANTOPRAZOLE SODIUM 40 MG/1
40 TABLET, DELAYED RELEASE ORAL DAILY
Status: DISCONTINUED | OUTPATIENT
Start: 2021-02-06 | End: 2021-02-10 | Stop reason: HOSPADM

## 2021-02-05 RX ORDER — TAMSULOSIN HYDROCHLORIDE 0.4 MG/1
0.4 CAPSULE ORAL DAILY
Status: DISCONTINUED | OUTPATIENT
Start: 2021-02-06 | End: 2021-02-10 | Stop reason: HOSPADM

## 2021-02-05 RX ORDER — OXYCODONE AND ACETAMINOPHEN 10; 325 MG/1; MG/1
1 TABLET ORAL 2 TIMES DAILY
Status: DISCONTINUED | OUTPATIENT
Start: 2021-02-05 | End: 2021-02-10 | Stop reason: HOSPADM

## 2021-02-05 RX ORDER — DONEPEZIL HYDROCHLORIDE 5 MG/1
5 TABLET, FILM COATED ORAL NIGHTLY
Status: DISCONTINUED | OUTPATIENT
Start: 2021-02-05 | End: 2021-02-10 | Stop reason: HOSPADM

## 2021-02-05 RX ORDER — NITROGLYCERIN 0.4 MG/1
0.4 TABLET SUBLINGUAL
Status: COMPLETED | OUTPATIENT
Start: 2021-02-05 | End: 2021-02-06

## 2021-02-05 RX ORDER — SODIUM CHLORIDE 0.9 % (FLUSH) 0.9 %
10 SYRINGE (ML) INJECTION EVERY 12 HOURS SCHEDULED
Status: DISCONTINUED | OUTPATIENT
Start: 2021-02-05 | End: 2021-02-10 | Stop reason: HOSPADM

## 2021-02-05 RX ORDER — LACTULOSE 10 G/15ML
20 SOLUTION ORAL 3 TIMES DAILY PRN
Status: DISCONTINUED | OUTPATIENT
Start: 2021-02-05 | End: 2021-02-10 | Stop reason: HOSPADM

## 2021-02-05 RX ORDER — DEXTROSE MONOHYDRATE 25 G/50ML
25 INJECTION, SOLUTION INTRAVENOUS
Status: DISCONTINUED | OUTPATIENT
Start: 2021-02-05 | End: 2021-02-10 | Stop reason: HOSPADM

## 2021-02-05 RX ORDER — SUCRALFATE 1 G/1
1 TABLET ORAL 4 TIMES DAILY
Status: DISCONTINUED | OUTPATIENT
Start: 2021-02-05 | End: 2021-02-10 | Stop reason: HOSPADM

## 2021-02-05 RX ORDER — ACETAMINOPHEN 325 MG/1
650 TABLET ORAL EVERY 4 HOURS PRN
Status: DISCONTINUED | OUTPATIENT
Start: 2021-02-05 | End: 2021-02-08 | Stop reason: SDUPTHER

## 2021-02-05 RX ORDER — LOSARTAN POTASSIUM 50 MG/1
100 TABLET ORAL
Status: DISCONTINUED | OUTPATIENT
Start: 2021-02-06 | End: 2021-02-10 | Stop reason: HOSPADM

## 2021-02-05 RX ORDER — ONDANSETRON 2 MG/ML
4 INJECTION INTRAMUSCULAR; INTRAVENOUS EVERY 6 HOURS PRN
Status: DISCONTINUED | OUTPATIENT
Start: 2021-02-05 | End: 2021-02-10 | Stop reason: HOSPADM

## 2021-02-05 RX ORDER — BUMETANIDE 1 MG/1
1 TABLET ORAL DAILY
Status: DISCONTINUED | OUTPATIENT
Start: 2021-02-06 | End: 2021-02-10 | Stop reason: HOSPADM

## 2021-02-05 RX ORDER — CYCLOBENZAPRINE HCL 10 MG
5 TABLET ORAL 2 TIMES DAILY PRN
Status: DISCONTINUED | OUTPATIENT
Start: 2021-02-05 | End: 2021-02-10 | Stop reason: HOSPADM

## 2021-02-05 RX ADMIN — NITROGLYCERIN 0.4 MG: 0.4 TABLET SUBLINGUAL at 21:25

## 2021-02-05 RX ADMIN — ONDANSETRON HYDROCHLORIDE 4 MG: 2 SOLUTION INTRAMUSCULAR; INTRAVENOUS at 22:07

## 2021-02-05 RX ADMIN — ASPIRIN 324 MG: 81 TABLET, CHEWABLE ORAL at 20:40

## 2021-02-05 RX ADMIN — NITROGLYCERIN 0.4 MG: 0.4 TABLET SUBLINGUAL at 21:20

## 2021-02-05 RX ADMIN — MORPHINE SULFATE 4 MG: 4 INJECTION, SOLUTION INTRAMUSCULAR; INTRAVENOUS at 22:07

## 2021-02-06 PROBLEM — Z95.1 HX OF CABG: Status: ACTIVE | Noted: 2021-02-06

## 2021-02-06 LAB
ALBUMIN SERPL-MCNC: 4.2 G/DL (ref 3.5–5.2)
ALBUMIN/GLOB SERPL: 1.6 G/DL
ALP SERPL-CCNC: 65 U/L (ref 39–117)
ALT SERPL W P-5'-P-CCNC: 10 U/L (ref 1–41)
ANION GAP SERPL CALCULATED.3IONS-SCNC: 11 MMOL/L (ref 5–15)
AST SERPL-CCNC: 13 U/L (ref 1–40)
BASOPHILS # BLD AUTO: 0.04 10*3/MM3 (ref 0–0.2)
BASOPHILS NFR BLD AUTO: 0.4 % (ref 0–1.5)
BILIRUB SERPL-MCNC: 0.3 MG/DL (ref 0–1.2)
BUN SERPL-MCNC: 46 MG/DL (ref 8–23)
BUN/CREAT SERPL: 19.7 (ref 7–25)
CALCIUM SPEC-SCNC: 9.5 MG/DL (ref 8.6–10.5)
CHLORIDE SERPL-SCNC: 100 MMOL/L (ref 98–107)
CHOLEST SERPL-MCNC: 133 MG/DL (ref 0–200)
CO2 SERPL-SCNC: 28 MMOL/L (ref 22–29)
CREAT SERPL-MCNC: 2.34 MG/DL (ref 0.76–1.27)
DEPRECATED RDW RBC AUTO: 45.1 FL (ref 37–54)
EOSINOPHIL # BLD AUTO: 0.39 10*3/MM3 (ref 0–0.4)
EOSINOPHIL NFR BLD AUTO: 3.9 % (ref 0.3–6.2)
ERYTHROCYTE [DISTWIDTH] IN BLOOD BY AUTOMATED COUNT: 14.1 % (ref 12.3–15.4)
GFR SERPL CREATININE-BSD FRML MDRD: 28 ML/MIN/1.73
GLOBULIN UR ELPH-MCNC: 2.7 GM/DL
GLUCOSE BLDC GLUCOMTR-MCNC: 256 MG/DL (ref 70–130)
GLUCOSE BLDC GLUCOMTR-MCNC: 261 MG/DL (ref 70–130)
GLUCOSE BLDC GLUCOMTR-MCNC: 268 MG/DL (ref 70–130)
GLUCOSE BLDC GLUCOMTR-MCNC: 284 MG/DL (ref 70–130)
GLUCOSE BLDC GLUCOMTR-MCNC: 335 MG/DL (ref 70–130)
GLUCOSE SERPL-MCNC: 234 MG/DL (ref 65–99)
HBA1C MFR BLD: 9.7 % (ref 4.8–5.6)
HCT VFR BLD AUTO: 31 % (ref 37.5–51)
HDLC SERPL-MCNC: 38 MG/DL (ref 40–60)
HGB BLD-MCNC: 10.5 G/DL (ref 13–17.7)
IMM GRANULOCYTES # BLD AUTO: 0.06 10*3/MM3 (ref 0–0.05)
IMM GRANULOCYTES NFR BLD AUTO: 0.6 % (ref 0–0.5)
LDLC SERPL CALC-MCNC: 67 MG/DL (ref 0–100)
LDLC/HDLC SERPL: 1.62 {RATIO}
LYMPHOCYTES # BLD AUTO: 1.88 10*3/MM3 (ref 0.7–3.1)
LYMPHOCYTES NFR BLD AUTO: 18.6 % (ref 19.6–45.3)
MAGNESIUM SERPL-MCNC: 2.6 MG/DL (ref 1.6–2.4)
MCH RBC QN AUTO: 29.7 PG (ref 26.6–33)
MCHC RBC AUTO-ENTMCNC: 33.9 G/DL (ref 31.5–35.7)
MCV RBC AUTO: 87.6 FL (ref 79–97)
MONOCYTES # BLD AUTO: 0.89 10*3/MM3 (ref 0.1–0.9)
MONOCYTES NFR BLD AUTO: 8.8 % (ref 5–12)
NEUTROPHILS NFR BLD AUTO: 6.83 10*3/MM3 (ref 1.7–7)
NEUTROPHILS NFR BLD AUTO: 67.7 % (ref 42.7–76)
NRBC BLD AUTO-RTO: 0 /100 WBC (ref 0–0.2)
PHOSPHATE SERPL-MCNC: 4 MG/DL (ref 2.5–4.5)
PLATELET # BLD AUTO: 242 10*3/MM3 (ref 140–450)
PMV BLD AUTO: 11.4 FL (ref 6–12)
POTASSIUM SERPL-SCNC: 4.4 MMOL/L (ref 3.5–5.2)
PROT SERPL-MCNC: 6.9 G/DL (ref 6–8.5)
QT INTERVAL: 354 MS
QT INTERVAL: 414 MS
QTC INTERVAL: 433 MS
QTC INTERVAL: 449 MS
RBC # BLD AUTO: 3.54 10*6/MM3 (ref 4.14–5.8)
SODIUM SERPL-SCNC: 139 MMOL/L (ref 136–145)
TRIGL SERPL-MCNC: 167 MG/DL (ref 0–150)
TROPONIN T SERPL-MCNC: 0.01 NG/ML (ref 0–0.03)
TROPONIN T SERPL-MCNC: <0.01 NG/ML (ref 0–0.03)
TROPONIN T SERPL-MCNC: <0.01 NG/ML (ref 0–0.03)
TSH SERPL DL<=0.05 MIU/L-ACNC: 1.82 UIU/ML (ref 0.27–4.2)
VLDLC SERPL-MCNC: 28 MG/DL (ref 5–40)
WBC # BLD AUTO: 10.09 10*3/MM3 (ref 3.4–10.8)

## 2021-02-06 PROCEDURE — 80061 LIPID PANEL: CPT | Performed by: INTERNAL MEDICINE

## 2021-02-06 PROCEDURE — 84484 ASSAY OF TROPONIN QUANT: CPT | Performed by: INTERNAL MEDICINE

## 2021-02-06 PROCEDURE — 63710000001 INSULIN LISPRO (HUMAN) PER 5 UNITS: Performed by: INTERNAL MEDICINE

## 2021-02-06 PROCEDURE — 84100 ASSAY OF PHOSPHORUS: CPT | Performed by: INTERNAL MEDICINE

## 2021-02-06 PROCEDURE — 82962 GLUCOSE BLOOD TEST: CPT

## 2021-02-06 PROCEDURE — 96372 THER/PROPH/DIAG INJ SC/IM: CPT

## 2021-02-06 PROCEDURE — 25010000002 ENOXAPARIN PER 10 MG: Performed by: INTERNAL MEDICINE

## 2021-02-06 PROCEDURE — 93005 ELECTROCARDIOGRAM TRACING: CPT | Performed by: PHYSICIAN ASSISTANT

## 2021-02-06 PROCEDURE — 93005 ELECTROCARDIOGRAM TRACING: CPT | Performed by: INTERNAL MEDICINE

## 2021-02-06 PROCEDURE — G0378 HOSPITAL OBSERVATION PER HR: HCPCS

## 2021-02-06 PROCEDURE — 25010000002 ONDANSETRON PER 1 MG: Performed by: INTERNAL MEDICINE

## 2021-02-06 PROCEDURE — 83735 ASSAY OF MAGNESIUM: CPT | Performed by: INTERNAL MEDICINE

## 2021-02-06 PROCEDURE — 84443 ASSAY THYROID STIM HORMONE: CPT | Performed by: INTERNAL MEDICINE

## 2021-02-06 PROCEDURE — 83036 HEMOGLOBIN GLYCOSYLATED A1C: CPT | Performed by: INTERNAL MEDICINE

## 2021-02-06 PROCEDURE — 93010 ELECTROCARDIOGRAM REPORT: CPT | Performed by: INTERNAL MEDICINE

## 2021-02-06 PROCEDURE — 85025 COMPLETE CBC W/AUTO DIFF WBC: CPT | Performed by: INTERNAL MEDICINE

## 2021-02-06 PROCEDURE — 84484 ASSAY OF TROPONIN QUANT: CPT | Performed by: PHYSICIAN ASSISTANT

## 2021-02-06 PROCEDURE — 80053 COMPREHEN METABOLIC PANEL: CPT | Performed by: INTERNAL MEDICINE

## 2021-02-06 PROCEDURE — 96376 TX/PRO/DX INJ SAME DRUG ADON: CPT

## 2021-02-06 PROCEDURE — 99244 OFF/OP CNSLTJ NEW/EST MOD 40: CPT | Performed by: PHYSICIAN ASSISTANT

## 2021-02-06 RX ADMIN — PANTOPRAZOLE SODIUM 40 MG: 40 TABLET, DELAYED RELEASE ORAL at 08:34

## 2021-02-06 RX ADMIN — NITROGLYCERIN 0.4 MG: 0.4 TABLET SUBLINGUAL at 13:29

## 2021-02-06 RX ADMIN — ENOXAPARIN SODIUM 40 MG: 40 INJECTION SUBCUTANEOUS at 05:50

## 2021-02-06 RX ADMIN — DONEPEZIL HYDROCHLORIDE 5 MG: 5 TABLET, FILM COATED ORAL at 00:03

## 2021-02-06 RX ADMIN — INSULIN LISPRO 10 UNITS: 100 INJECTION, SOLUTION INTRAVENOUS; SUBCUTANEOUS at 11:27

## 2021-02-06 RX ADMIN — ONDANSETRON HYDROCHLORIDE 4 MG: 2 SOLUTION INTRAMUSCULAR; INTRAVENOUS at 21:35

## 2021-02-06 RX ADMIN — GABAPENTIN 300 MG: 300 CAPSULE ORAL at 15:00

## 2021-02-06 RX ADMIN — ONDANSETRON HYDROCHLORIDE 4 MG: 2 SOLUTION INTRAMUSCULAR; INTRAVENOUS at 13:29

## 2021-02-06 RX ADMIN — SUCRALFATE 1 G: 1 TABLET ORAL at 17:08

## 2021-02-06 RX ADMIN — SODIUM CHLORIDE, PRESERVATIVE FREE 10 ML: 5 INJECTION INTRAVENOUS at 08:35

## 2021-02-06 RX ADMIN — SUCRALFATE 1 G: 1 TABLET ORAL at 21:32

## 2021-02-06 RX ADMIN — ROSUVASTATIN CALCIUM 40 MG: 20 TABLET, FILM COATED ORAL at 21:32

## 2021-02-06 RX ADMIN — MIDODRINE HYDROCHLORIDE 10 MG: 2.5 TABLET ORAL at 08:34

## 2021-02-06 RX ADMIN — DULOXETINE HYDROCHLORIDE 60 MG: 30 CAPSULE, DELAYED RELEASE ORAL at 08:34

## 2021-02-06 RX ADMIN — SODIUM CHLORIDE, PRESERVATIVE FREE 10 ML: 5 INJECTION INTRAVENOUS at 00:18

## 2021-02-06 RX ADMIN — TRAZODONE HYDROCHLORIDE 100 MG: 100 TABLET ORAL at 00:03

## 2021-02-06 RX ADMIN — SUCRALFATE 1 G: 1 TABLET ORAL at 08:34

## 2021-02-06 RX ADMIN — INSULIN LISPRO 8 UNITS: 100 INJECTION, SOLUTION INTRAVENOUS; SUBCUTANEOUS at 17:08

## 2021-02-06 RX ADMIN — DONEPEZIL HYDROCHLORIDE 5 MG: 5 TABLET, FILM COATED ORAL at 21:32

## 2021-02-06 RX ADMIN — ENOXAPARIN SODIUM 40 MG: 40 INJECTION SUBCUTANEOUS at 17:08

## 2021-02-06 RX ADMIN — SUCRALFATE 1 G: 1 TABLET ORAL at 00:03

## 2021-02-06 RX ADMIN — TRAZODONE HYDROCHLORIDE 100 MG: 100 TABLET ORAL at 21:32

## 2021-02-06 RX ADMIN — SUCRALFATE 1 G: 1 TABLET ORAL at 15:00

## 2021-02-06 RX ADMIN — GABAPENTIN 300 MG: 300 CAPSULE ORAL at 21:32

## 2021-02-06 RX ADMIN — OXYCODONE HYDROCHLORIDE AND ACETAMINOPHEN 1 TABLET: 10; 325 TABLET ORAL at 00:03

## 2021-02-06 RX ADMIN — OXYCODONE HYDROCHLORIDE AND ACETAMINOPHEN 1 TABLET: 10; 325 TABLET ORAL at 08:34

## 2021-02-06 RX ADMIN — GABAPENTIN 300 MG: 300 CAPSULE ORAL at 00:03

## 2021-02-06 RX ADMIN — ROSUVASTATIN CALCIUM 40 MG: 20 TABLET, FILM COATED ORAL at 00:03

## 2021-02-06 RX ADMIN — TAMSULOSIN HYDROCHLORIDE 0.4 MG: 0.4 CAPSULE ORAL at 08:34

## 2021-02-06 RX ADMIN — GABAPENTIN 300 MG: 300 CAPSULE ORAL at 08:34

## 2021-02-06 RX ADMIN — BUMETANIDE 1 MG: 1 TABLET ORAL at 08:34

## 2021-02-06 RX ADMIN — SODIUM CHLORIDE, PRESERVATIVE FREE 10 ML: 5 INJECTION INTRAVENOUS at 21:32

## 2021-02-06 RX ADMIN — OXYCODONE HYDROCHLORIDE AND ACETAMINOPHEN 1 TABLET: 10; 325 TABLET ORAL at 21:32

## 2021-02-06 RX ADMIN — LOSARTAN POTASSIUM 100 MG: 50 TABLET, FILM COATED ORAL at 08:34

## 2021-02-06 RX ADMIN — ASPIRIN 81 MG: 81 TABLET, FILM COATED ORAL at 08:34

## 2021-02-07 ENCOUNTER — APPOINTMENT (OUTPATIENT)
Dept: CARDIOLOGY | Facility: HOSPITAL | Age: 66
End: 2021-02-07

## 2021-02-07 LAB
BH CV ECHO MEAS - AO MAX PG (FULL): 8.3 MMHG
BH CV ECHO MEAS - AO MAX PG: 13.2 MMHG
BH CV ECHO MEAS - AO MEAN PG (FULL): 5 MMHG
BH CV ECHO MEAS - AO MEAN PG: 9 MMHG
BH CV ECHO MEAS - AO ROOT AREA (BSA CORRECTED): 1.3
BH CV ECHO MEAS - AO ROOT AREA: 9.6 CM^2
BH CV ECHO MEAS - AO ROOT DIAM: 3.5 CM
BH CV ECHO MEAS - AO V2 MAX: 182 CM/SEC
BH CV ECHO MEAS - AO V2 MEAN: 143 CM/SEC
BH CV ECHO MEAS - AO V2 VTI: 47.8 CM
BH CV ECHO MEAS - AVA(I,A): 2.3 CM^2
BH CV ECHO MEAS - AVA(I,D): 2.3 CM^2
BH CV ECHO MEAS - AVA(V,A): 2.3 CM^2
BH CV ECHO MEAS - AVA(V,D): 2.3 CM^2
BH CV ECHO MEAS - BSA(HAYCOCK): 2.8 M^2
BH CV ECHO MEAS - BSA: 2.6 M^2
BH CV ECHO MEAS - BZI_BMI: 44.5 KILOGRAMS/M^2
BH CV ECHO MEAS - BZI_METRIC_HEIGHT: 182.9 CM
BH CV ECHO MEAS - BZI_METRIC_WEIGHT: 148.8 KG
BH CV ECHO MEAS - EDV(CUBED): 102.5 ML
BH CV ECHO MEAS - EDV(MOD-SP4): 114 ML
BH CV ECHO MEAS - EDV(TEICH): 101.3 ML
BH CV ECHO MEAS - EF(CUBED): 51.8 %
BH CV ECHO MEAS - EF(MOD-SP4): 49.9 %
BH CV ECHO MEAS - EF(TEICH): 43.8 %
BH CV ECHO MEAS - ESV(CUBED): 49.4 ML
BH CV ECHO MEAS - ESV(MOD-SP4): 57.1 ML
BH CV ECHO MEAS - ESV(TEICH): 57 ML
BH CV ECHO MEAS - FS: 21.6 %
BH CV ECHO MEAS - IVS/LVPW: 0.8
BH CV ECHO MEAS - IVSD: 1.1 CM
BH CV ECHO MEAS - LA DIMENSION: 4 CM
BH CV ECHO MEAS - LA/AO: 1.1
BH CV ECHO MEAS - LAT PEAK E' VEL: 8 CM/SEC
BH CV ECHO MEAS - LV DIASTOLIC VOL/BSA (35-75): 43.4 ML/M^2
BH CV ECHO MEAS - LV MASS(C)D: 214.4 GRAMS
BH CV ECHO MEAS - LV MASS(C)DI: 81.5 GRAMS/M^2
BH CV ECHO MEAS - LV MAX PG: 4.9 MMHG
BH CV ECHO MEAS - LV MEAN PG: 4 MMHG
BH CV ECHO MEAS - LV SYSTOLIC VOL/BSA (12-30): 21.7 ML/M^2
BH CV ECHO MEAS - LV V1 MAX: 111 CM/SEC
BH CV ECHO MEAS - LV V1 MEAN: 95.4 CM/SEC
BH CV ECHO MEAS - LV V1 VTI: 28.9 CM
BH CV ECHO MEAS - LVIDD: 4.7 CM
BH CV ECHO MEAS - LVIDS: 3.7 CM
BH CV ECHO MEAS - LVLD AP4: 6.9 CM
BH CV ECHO MEAS - LVLS AP4: 5.3 CM
BH CV ECHO MEAS - LVOT AREA (M): 3.8 CM^2
BH CV ECHO MEAS - LVOT AREA: 3.8 CM^2
BH CV ECHO MEAS - LVOT DIAM: 2.2 CM
BH CV ECHO MEAS - LVPWD: 1.4 CM
BH CV ECHO MEAS - MED PEAK E' VEL: 7.1 CM/SEC
BH CV ECHO MEAS - MV A MAX VEL: 96 CM/SEC
BH CV ECHO MEAS - MV DEC SLOPE: 537 CM/SEC^2
BH CV ECHO MEAS - MV DEC TIME: 0.19 SEC
BH CV ECHO MEAS - MV E MAX VEL: 100 CM/SEC
BH CV ECHO MEAS - MV E/A: 1
BH CV ECHO MEAS - SI(AO): 174.9 ML/M^2
BH CV ECHO MEAS - SI(CUBED): 20.2 ML/M^2
BH CV ECHO MEAS - SI(LVOT): 41.8 ML/M^2
BH CV ECHO MEAS - SI(MOD-SP4): 21.6 ML/M^2
BH CV ECHO MEAS - SI(TEICH): 16.9 ML/M^2
BH CV ECHO MEAS - SV(AO): 459.9 ML
BH CV ECHO MEAS - SV(CUBED): 53.1 ML
BH CV ECHO MEAS - SV(LVOT): 109.9 ML
BH CV ECHO MEAS - SV(MOD-SP4): 56.9 ML
BH CV ECHO MEAS - SV(TEICH): 44.3 ML
BH CV ECHO MEASUREMENTS AVERAGE E/E' RATIO: 13.25
BH CV STRESS BP STAGE 1: NORMAL
BH CV STRESS COMMENTS STAGE 1: NORMAL
BH CV STRESS DOSE REGADENOSON STAGE 1: 0.4
BH CV STRESS DURATION MIN STAGE 1: 0
BH CV STRESS DURATION SEC STAGE 1: 10
BH CV STRESS HR STAGE 1: 86
BH CV STRESS PROTOCOL 1: NORMAL
BH CV STRESS RECOVERY BP: NORMAL MMHG
BH CV STRESS RECOVERY HR: 85 BPM
BH CV STRESS STAGE 1: 1
GLUCOSE BLDC GLUCOMTR-MCNC: 257 MG/DL (ref 70–130)
GLUCOSE BLDC GLUCOMTR-MCNC: 332 MG/DL (ref 70–130)
GLUCOSE BLDC GLUCOMTR-MCNC: 347 MG/DL (ref 70–130)
GLUCOSE BLDC GLUCOMTR-MCNC: 366 MG/DL (ref 70–130)
GLUCOSE BLDC GLUCOMTR-MCNC: 425 MG/DL (ref 70–130)
LEFT ATRIUM VOLUME INDEX: 21.8 ML/M2
LEFT ATRIUM VOLUME: 57.4 CM3
MAXIMAL PREDICTED HEART RATE: 156 BPM
PERCENT MAX PREDICTED HR: 55.13 %
STRESS BASELINE BP: NORMAL MMHG
STRESS BASELINE HR: 72 BPM
STRESS PERCENT HR: 65 %
STRESS POST EXERCISE DUR SEC: 10 SEC
STRESS POST PEAK BP: NORMAL MMHG
STRESS POST PEAK HR: 86 BPM
STRESS TARGET HR: 133 BPM

## 2021-02-07 PROCEDURE — 0 TECHNETIUM SESTAMIBI: Performed by: INTERNAL MEDICINE

## 2021-02-07 PROCEDURE — 78452 HT MUSCLE IMAGE SPECT MULT: CPT | Performed by: EMERGENCY MEDICINE

## 2021-02-07 PROCEDURE — 25010000002 REGADENOSON 0.4 MG/5ML SOLUTION: Performed by: EMERGENCY MEDICINE

## 2021-02-07 PROCEDURE — 93306 TTE W/DOPPLER COMPLETE: CPT

## 2021-02-07 PROCEDURE — 25010000002 DROPERIDOL PER 5 MG: Performed by: INTERNAL MEDICINE

## 2021-02-07 PROCEDURE — 82962 GLUCOSE BLOOD TEST: CPT

## 2021-02-07 PROCEDURE — G0378 HOSPITAL OBSERVATION PER HR: HCPCS

## 2021-02-07 PROCEDURE — 25010000002 ENOXAPARIN PER 10 MG: Performed by: INTERNAL MEDICINE

## 2021-02-07 PROCEDURE — 93306 TTE W/DOPPLER COMPLETE: CPT | Performed by: EMERGENCY MEDICINE

## 2021-02-07 PROCEDURE — 63710000001 INSULIN LISPRO (HUMAN) PER 5 UNITS: Performed by: INTERNAL MEDICINE

## 2021-02-07 PROCEDURE — 25010000002 ONDANSETRON PER 1 MG: Performed by: INTERNAL MEDICINE

## 2021-02-07 PROCEDURE — 93017 CV STRESS TEST TRACING ONLY: CPT

## 2021-02-07 PROCEDURE — 96372 THER/PROPH/DIAG INJ SC/IM: CPT

## 2021-02-07 PROCEDURE — A9500 TC99M SESTAMIBI: HCPCS | Performed by: INTERNAL MEDICINE

## 2021-02-07 PROCEDURE — 99214 OFFICE O/P EST MOD 30 MIN: CPT | Performed by: PHYSICIAN ASSISTANT

## 2021-02-07 PROCEDURE — 96376 TX/PRO/DX INJ SAME DRUG ADON: CPT

## 2021-02-07 PROCEDURE — 63710000001 INSULIN REGULAR HUMAN PER 5 UNITS: Performed by: INTERNAL MEDICINE

## 2021-02-07 PROCEDURE — 78452 HT MUSCLE IMAGE SPECT MULT: CPT

## 2021-02-07 PROCEDURE — 25010000002 PERFLUTREN 6.52 MG/ML SUSPENSION: Performed by: INTERNAL MEDICINE

## 2021-02-07 PROCEDURE — 96375 TX/PRO/DX INJ NEW DRUG ADDON: CPT

## 2021-02-07 PROCEDURE — 93016 CV STRESS TEST SUPVJ ONLY: CPT | Performed by: EMERGENCY MEDICINE

## 2021-02-07 PROCEDURE — 93018 CV STRESS TEST I&R ONLY: CPT | Performed by: EMERGENCY MEDICINE

## 2021-02-07 RX ORDER — DROPERIDOL 2.5 MG/ML
1.25 INJECTION, SOLUTION INTRAMUSCULAR; INTRAVENOUS EVERY 6 HOURS PRN
Status: DISCONTINUED | OUTPATIENT
Start: 2021-02-07 | End: 2021-02-10 | Stop reason: HOSPADM

## 2021-02-07 RX ORDER — CARVEDILOL 3.12 MG/1
3.12 TABLET ORAL 2 TIMES DAILY WITH MEALS
Status: DISCONTINUED | OUTPATIENT
Start: 2021-02-07 | End: 2021-02-10 | Stop reason: HOSPADM

## 2021-02-07 RX ADMIN — INSULIN HUMAN 15 UNITS: 100 INJECTION, SOLUTION PARENTERAL at 17:06

## 2021-02-07 RX ADMIN — INSULIN LISPRO 40 UNITS: 100 INJECTION, SOLUTION INTRAVENOUS; SUBCUTANEOUS at 17:06

## 2021-02-07 RX ADMIN — GABAPENTIN 300 MG: 300 CAPSULE ORAL at 20:55

## 2021-02-07 RX ADMIN — ROSUVASTATIN CALCIUM 40 MG: 20 TABLET, FILM COATED ORAL at 20:55

## 2021-02-07 RX ADMIN — INSULIN LISPRO 10 UNITS: 100 INJECTION, SOLUTION INTRAVENOUS; SUBCUTANEOUS at 11:56

## 2021-02-07 RX ADMIN — SUCRALFATE 1 G: 1 TABLET ORAL at 20:55

## 2021-02-07 RX ADMIN — GABAPENTIN 300 MG: 300 CAPSULE ORAL at 17:07

## 2021-02-07 RX ADMIN — INSULIN LISPRO 14 UNITS: 100 INJECTION, SOLUTION INTRAVENOUS; SUBCUTANEOUS at 17:09

## 2021-02-07 RX ADMIN — ENOXAPARIN SODIUM 40 MG: 40 INJECTION SUBCUTANEOUS at 06:16

## 2021-02-07 RX ADMIN — OXYCODONE HYDROCHLORIDE AND ACETAMINOPHEN 1 TABLET: 10; 325 TABLET ORAL at 20:55

## 2021-02-07 RX ADMIN — DULOXETINE HYDROCHLORIDE 60 MG: 30 CAPSULE, DELAYED RELEASE ORAL at 08:29

## 2021-02-07 RX ADMIN — PERFLUTREN 8.48 MG: 6.52 INJECTION, SUSPENSION INTRAVENOUS at 09:18

## 2021-02-07 RX ADMIN — CARVEDILOL 3.12 MG: 3.12 TABLET, FILM COATED ORAL at 17:07

## 2021-02-07 RX ADMIN — TECHNETIUM TC 99M SESTAMIBI 1 DOSE: 1 INJECTION INTRAVENOUS at 09:00

## 2021-02-07 RX ADMIN — SUCRALFATE 1 G: 1 TABLET ORAL at 17:07

## 2021-02-07 RX ADMIN — INSULIN LISPRO 25 UNITS: 100 INJECTION, SOLUTION INTRAVENOUS; SUBCUTANEOUS at 11:56

## 2021-02-07 RX ADMIN — GABAPENTIN 300 MG: 300 CAPSULE ORAL at 08:29

## 2021-02-07 RX ADMIN — SUCRALFATE 1 G: 1 TABLET ORAL at 11:56

## 2021-02-07 RX ADMIN — DROPERIDOL 1.25 MG: 2.5 INJECTION, SOLUTION INTRAMUSCULAR; INTRAVENOUS at 20:56

## 2021-02-07 RX ADMIN — TECHNETIUM TC 99M SESTAMIBI 1 DOSE: 1 INJECTION INTRAVENOUS at 10:30

## 2021-02-07 RX ADMIN — TRAZODONE HYDROCHLORIDE 100 MG: 100 TABLET ORAL at 20:55

## 2021-02-07 RX ADMIN — SODIUM CHLORIDE, PRESERVATIVE FREE 10 ML: 5 INJECTION INTRAVENOUS at 20:55

## 2021-02-07 RX ADMIN — DONEPEZIL HYDROCHLORIDE 5 MG: 5 TABLET, FILM COATED ORAL at 20:55

## 2021-02-07 RX ADMIN — BUMETANIDE 1 MG: 1 TABLET ORAL at 08:29

## 2021-02-07 RX ADMIN — ONDANSETRON HYDROCHLORIDE 4 MG: 2 SOLUTION INTRAMUSCULAR; INTRAVENOUS at 12:00

## 2021-02-07 RX ADMIN — ASPIRIN 81 MG: 81 TABLET, FILM COATED ORAL at 08:29

## 2021-02-07 RX ADMIN — OXYCODONE HYDROCHLORIDE AND ACETAMINOPHEN 1 TABLET: 10; 325 TABLET ORAL at 08:29

## 2021-02-07 RX ADMIN — REGADENOSON 0.4 MG: 0.08 INJECTION, SOLUTION INTRAVENOUS at 10:04

## 2021-02-07 RX ADMIN — TAMSULOSIN HYDROCHLORIDE 0.4 MG: 0.4 CAPSULE ORAL at 08:29

## 2021-02-07 RX ADMIN — SODIUM CHLORIDE, PRESERVATIVE FREE 10 ML: 5 INJECTION INTRAVENOUS at 08:31

## 2021-02-07 RX ADMIN — PANTOPRAZOLE SODIUM 40 MG: 40 TABLET, DELAYED RELEASE ORAL at 08:30

## 2021-02-07 RX ADMIN — SUCRALFATE 1 G: 1 TABLET ORAL at 08:29

## 2021-02-07 RX ADMIN — ENOXAPARIN SODIUM 40 MG: 40 INJECTION SUBCUTANEOUS at 17:09

## 2021-02-07 RX ADMIN — LOSARTAN POTASSIUM 100 MG: 50 TABLET, FILM COATED ORAL at 08:29

## 2021-02-08 ENCOUNTER — EPISODE CHANGES (OUTPATIENT)
Dept: CASE MANAGEMENT | Facility: OTHER | Age: 66
End: 2021-02-08

## 2021-02-08 LAB
AMYLASE SERPL-CCNC: 63 U/L (ref 28–100)
ANION GAP SERPL CALCULATED.3IONS-SCNC: 7 MMOL/L (ref 5–15)
BUN SERPL-MCNC: 50 MG/DL (ref 8–23)
BUN/CREAT SERPL: 20.7 (ref 7–25)
CALCIUM SPEC-SCNC: 9 MG/DL (ref 8.6–10.5)
CHLORIDE SERPL-SCNC: 97 MMOL/L (ref 98–107)
CO2 SERPL-SCNC: 28 MMOL/L (ref 22–29)
CREAT SERPL-MCNC: 2.41 MG/DL (ref 0.76–1.27)
CRP SERPL-MCNC: 0.5 MG/DL (ref 0–0.5)
DEPRECATED RDW RBC AUTO: 42.8 FL (ref 37–54)
ERYTHROCYTE [DISTWIDTH] IN BLOOD BY AUTOMATED COUNT: 13.7 % (ref 12.3–15.4)
GFR SERPL CREATININE-BSD FRML MDRD: 27 ML/MIN/1.73
GLUCOSE BLDC GLUCOMTR-MCNC: 218 MG/DL (ref 70–130)
GLUCOSE BLDC GLUCOMTR-MCNC: 242 MG/DL (ref 70–130)
GLUCOSE BLDC GLUCOMTR-MCNC: 266 MG/DL (ref 70–130)
GLUCOSE SERPL-MCNC: 243 MG/DL (ref 65–99)
HCT VFR BLD AUTO: 29.4 % (ref 37.5–51)
HGB BLD-MCNC: 9.9 G/DL (ref 13–17.7)
LIPASE SERPL-CCNC: 12 U/L (ref 13–60)
MCH RBC QN AUTO: 29.2 PG (ref 26.6–33)
MCHC RBC AUTO-ENTMCNC: 33.7 G/DL (ref 31.5–35.7)
MCV RBC AUTO: 86.7 FL (ref 79–97)
PLATELET # BLD AUTO: 197 10*3/MM3 (ref 140–450)
PMV BLD AUTO: 11.5 FL (ref 6–12)
POTASSIUM SERPL-SCNC: 4.4 MMOL/L (ref 3.5–5.2)
QT INTERVAL: 396 MS
QTC INTERVAL: 442 MS
RBC # BLD AUTO: 3.39 10*6/MM3 (ref 4.14–5.8)
SODIUM SERPL-SCNC: 132 MMOL/L (ref 136–145)
WBC # BLD AUTO: 7.54 10*3/MM3 (ref 3.4–10.8)

## 2021-02-08 PROCEDURE — 93459 L HRT ART/GRFT ANGIO: CPT | Performed by: EMERGENCY MEDICINE

## 2021-02-08 PROCEDURE — C1894 INTRO/SHEATH, NON-LASER: HCPCS | Performed by: EMERGENCY MEDICINE

## 2021-02-08 PROCEDURE — 63710000001 INSULIN LISPRO (HUMAN) PER 5 UNITS: Performed by: INTERNAL MEDICINE

## 2021-02-08 PROCEDURE — 25010000002 HEPARIN (PORCINE) 1000-0.9 UT/500ML-% SOLUTION: Performed by: EMERGENCY MEDICINE

## 2021-02-08 PROCEDURE — 96372 THER/PROPH/DIAG INJ SC/IM: CPT

## 2021-02-08 PROCEDURE — 25010000002 HEPARIN (PORCINE) PER 1000 UNITS: Performed by: EMERGENCY MEDICINE

## 2021-02-08 PROCEDURE — 25010000002 ENOXAPARIN PER 10 MG: Performed by: EMERGENCY MEDICINE

## 2021-02-08 PROCEDURE — 25010000002 FENTANYL CITRATE (PF) 100 MCG/2ML SOLUTION: Performed by: EMERGENCY MEDICINE

## 2021-02-08 PROCEDURE — 25010000002 DROPERIDOL PER 5 MG: Performed by: INTERNAL MEDICINE

## 2021-02-08 PROCEDURE — G0378 HOSPITAL OBSERVATION PER HR: HCPCS

## 2021-02-08 PROCEDURE — 25010000002 HEPARIN (PORCINE) 2000-0.9 UNIT/L-% SOLUTION: Performed by: EMERGENCY MEDICINE

## 2021-02-08 PROCEDURE — 99152 MOD SED SAME PHYS/QHP 5/>YRS: CPT | Performed by: EMERGENCY MEDICINE

## 2021-02-08 PROCEDURE — 25010000002 DIPHENHYDRAMINE PER 50 MG: Performed by: EMERGENCY MEDICINE

## 2021-02-08 PROCEDURE — 82150 ASSAY OF AMYLASE: CPT | Performed by: INTERNAL MEDICINE

## 2021-02-08 PROCEDURE — 96376 TX/PRO/DX INJ SAME DRUG ADON: CPT

## 2021-02-08 PROCEDURE — 25010000002 MIDAZOLAM HCL (PF) 5 MG/5ML SOLUTION: Performed by: EMERGENCY MEDICINE

## 2021-02-08 PROCEDURE — 63710000001 INSULIN LISPRO (HUMAN) PER 5 UNITS: Performed by: EMERGENCY MEDICINE

## 2021-02-08 PROCEDURE — 25010000002 IOPAMIDOL 61 % SOLUTION: Performed by: EMERGENCY MEDICINE

## 2021-02-08 PROCEDURE — 82962 GLUCOSE BLOOD TEST: CPT

## 2021-02-08 PROCEDURE — 83690 ASSAY OF LIPASE: CPT | Performed by: INTERNAL MEDICINE

## 2021-02-08 PROCEDURE — 25010000002 ENOXAPARIN PER 10 MG: Performed by: INTERNAL MEDICINE

## 2021-02-08 PROCEDURE — 86140 C-REACTIVE PROTEIN: CPT | Performed by: INTERNAL MEDICINE

## 2021-02-08 PROCEDURE — 25010000002 DROPERIDOL PER 5 MG: Performed by: EMERGENCY MEDICINE

## 2021-02-08 PROCEDURE — 85027 COMPLETE CBC AUTOMATED: CPT | Performed by: INTERNAL MEDICINE

## 2021-02-08 PROCEDURE — C1769 GUIDE WIRE: HCPCS | Performed by: EMERGENCY MEDICINE

## 2021-02-08 PROCEDURE — 99153 MOD SED SAME PHYS/QHP EA: CPT | Performed by: EMERGENCY MEDICINE

## 2021-02-08 PROCEDURE — 80048 BASIC METABOLIC PNL TOTAL CA: CPT | Performed by: INTERNAL MEDICINE

## 2021-02-08 RX ORDER — LIDOCAINE HYDROCHLORIDE 20 MG/ML
INJECTION, SOLUTION EPIDURAL; INFILTRATION; INTRACAUDAL; PERINEURAL AS NEEDED
Status: DISCONTINUED | OUTPATIENT
Start: 2021-02-08 | End: 2021-02-08 | Stop reason: HOSPADM

## 2021-02-08 RX ORDER — DIPHENHYDRAMINE HYDROCHLORIDE 50 MG/ML
INJECTION INTRAMUSCULAR; INTRAVENOUS AS NEEDED
Status: DISCONTINUED | OUTPATIENT
Start: 2021-02-08 | End: 2021-02-08 | Stop reason: HOSPADM

## 2021-02-08 RX ORDER — HEPARIN SODIUM 200 [USP'U]/100ML
INJECTION, SOLUTION INTRAVENOUS AS NEEDED
Status: DISCONTINUED | OUTPATIENT
Start: 2021-02-08 | End: 2021-02-08 | Stop reason: HOSPADM

## 2021-02-08 RX ORDER — FENTANYL CITRATE 50 UG/ML
INJECTION, SOLUTION INTRAMUSCULAR; INTRAVENOUS AS NEEDED
Status: DISCONTINUED | OUTPATIENT
Start: 2021-02-08 | End: 2021-02-08 | Stop reason: HOSPADM

## 2021-02-08 RX ORDER — SODIUM CHLORIDE, SODIUM LACTATE, POTASSIUM CHLORIDE, CALCIUM CHLORIDE 600; 310; 30; 20 MG/100ML; MG/100ML; MG/100ML; MG/100ML
75 INJECTION, SOLUTION INTRAVENOUS CONTINUOUS
Status: DISPENSED | OUTPATIENT
Start: 2021-02-08 | End: 2021-02-08

## 2021-02-08 RX ORDER — MIDAZOLAM HYDROCHLORIDE 1 MG/ML
INJECTION, SOLUTION INTRAMUSCULAR; INTRAVENOUS AS NEEDED
Status: DISCONTINUED | OUTPATIENT
Start: 2021-02-08 | End: 2021-02-08 | Stop reason: HOSPADM

## 2021-02-08 RX ORDER — SODIUM CHLORIDE 9 MG/ML
50 INJECTION, SOLUTION INTRAVENOUS CONTINUOUS
Status: DISCONTINUED | OUTPATIENT
Start: 2021-02-08 | End: 2021-02-10 | Stop reason: HOSPADM

## 2021-02-08 RX ORDER — ACETAMINOPHEN 325 MG/1
650 TABLET ORAL EVERY 4 HOURS PRN
Status: DISCONTINUED | OUTPATIENT
Start: 2021-02-08 | End: 2021-02-10 | Stop reason: HOSPADM

## 2021-02-08 RX ADMIN — INSULIN LISPRO 25 UNITS: 100 INJECTION, SOLUTION INTRAVENOUS; SUBCUTANEOUS at 08:38

## 2021-02-08 RX ADMIN — ENOXAPARIN SODIUM 40 MG: 40 INJECTION SUBCUTANEOUS at 17:22

## 2021-02-08 RX ADMIN — DONEPEZIL HYDROCHLORIDE 5 MG: 5 TABLET, FILM COATED ORAL at 21:03

## 2021-02-08 RX ADMIN — TAMSULOSIN HYDROCHLORIDE 0.4 MG: 0.4 CAPSULE ORAL at 08:37

## 2021-02-08 RX ADMIN — INSULIN LISPRO 40 UNITS: 100 INJECTION, SOLUTION INTRAVENOUS; SUBCUTANEOUS at 17:23

## 2021-02-08 RX ADMIN — ROSUVASTATIN CALCIUM 40 MG: 20 TABLET, FILM COATED ORAL at 21:03

## 2021-02-08 RX ADMIN — SODIUM CHLORIDE, PRESERVATIVE FREE 10 ML: 5 INJECTION INTRAVENOUS at 08:38

## 2021-02-08 RX ADMIN — BUMETANIDE 1 MG: 1 TABLET ORAL at 08:37

## 2021-02-08 RX ADMIN — SUCRALFATE 1 G: 1 TABLET ORAL at 17:22

## 2021-02-08 RX ADMIN — OXYCODONE HYDROCHLORIDE AND ACETAMINOPHEN 1 TABLET: 10; 325 TABLET ORAL at 21:03

## 2021-02-08 RX ADMIN — DROPERIDOL 1.25 MG: 2.5 INJECTION, SOLUTION INTRAMUSCULAR; INTRAVENOUS at 21:33

## 2021-02-08 RX ADMIN — PANTOPRAZOLE SODIUM 40 MG: 40 TABLET, DELAYED RELEASE ORAL at 08:37

## 2021-02-08 RX ADMIN — GABAPENTIN 300 MG: 300 CAPSULE ORAL at 17:21

## 2021-02-08 RX ADMIN — INSULIN LISPRO 5 UNITS: 100 INJECTION, SOLUTION INTRAVENOUS; SUBCUTANEOUS at 17:22

## 2021-02-08 RX ADMIN — GABAPENTIN 300 MG: 300 CAPSULE ORAL at 21:03

## 2021-02-08 RX ADMIN — ENOXAPARIN SODIUM 40 MG: 40 INJECTION SUBCUTANEOUS at 05:49

## 2021-02-08 RX ADMIN — DROPERIDOL 1.25 MG: 2.5 INJECTION, SOLUTION INTRAMUSCULAR; INTRAVENOUS at 08:39

## 2021-02-08 RX ADMIN — LOSARTAN POTASSIUM 100 MG: 50 TABLET, FILM COATED ORAL at 08:37

## 2021-02-08 RX ADMIN — SODIUM CHLORIDE, PRESERVATIVE FREE 10 ML: 5 INJECTION INTRAVENOUS at 21:02

## 2021-02-08 RX ADMIN — SUCRALFATE 1 G: 1 TABLET ORAL at 21:03

## 2021-02-08 RX ADMIN — GABAPENTIN 300 MG: 300 CAPSULE ORAL at 08:36

## 2021-02-08 RX ADMIN — CARVEDILOL 3.12 MG: 3.12 TABLET, FILM COATED ORAL at 17:22

## 2021-02-08 RX ADMIN — ASPIRIN 81 MG: 81 TABLET, FILM COATED ORAL at 08:37

## 2021-02-08 RX ADMIN — TRAZODONE HYDROCHLORIDE 100 MG: 100 TABLET ORAL at 21:03

## 2021-02-08 RX ADMIN — DULOXETINE HYDROCHLORIDE 60 MG: 30 CAPSULE, DELAYED RELEASE ORAL at 08:36

## 2021-02-08 RX ADMIN — SUCRALFATE 1 G: 1 TABLET ORAL at 08:36

## 2021-02-08 RX ADMIN — OXYCODONE HYDROCHLORIDE AND ACETAMINOPHEN 1 TABLET: 10; 325 TABLET ORAL at 08:37

## 2021-02-08 RX ADMIN — INSULIN LISPRO 5 UNITS: 100 INJECTION, SOLUTION INTRAVENOUS; SUBCUTANEOUS at 08:39

## 2021-02-08 RX ADMIN — CARVEDILOL 3.12 MG: 3.12 TABLET, FILM COATED ORAL at 08:37

## 2021-02-09 LAB
ANION GAP SERPL CALCULATED.3IONS-SCNC: 10 MMOL/L (ref 5–15)
BUN SERPL-MCNC: 52 MG/DL (ref 8–23)
BUN/CREAT SERPL: 21.8 (ref 7–25)
CALCIUM SPEC-SCNC: 8.8 MG/DL (ref 8.6–10.5)
CHLORIDE SERPL-SCNC: 98 MMOL/L (ref 98–107)
CO2 SERPL-SCNC: 26 MMOL/L (ref 22–29)
CREAT SERPL-MCNC: 2.38 MG/DL (ref 0.76–1.27)
DEPRECATED RDW RBC AUTO: 42.9 FL (ref 37–54)
ERYTHROCYTE [DISTWIDTH] IN BLOOD BY AUTOMATED COUNT: 13.9 % (ref 12.3–15.4)
ERYTHROCYTE [SEDIMENTATION RATE] IN BLOOD: 21 MM/HR (ref 0–15)
GFR SERPL CREATININE-BSD FRML MDRD: 28 ML/MIN/1.73
GLUCOSE BLDC GLUCOMTR-MCNC: 232 MG/DL (ref 70–130)
GLUCOSE BLDC GLUCOMTR-MCNC: 281 MG/DL (ref 70–130)
GLUCOSE BLDC GLUCOMTR-MCNC: 308 MG/DL (ref 70–130)
GLUCOSE BLDC GLUCOMTR-MCNC: 311 MG/DL (ref 70–130)
GLUCOSE SERPL-MCNC: 255 MG/DL (ref 65–99)
HCT VFR BLD AUTO: 27.6 % (ref 37.5–51)
HGB BLD-MCNC: 9.7 G/DL (ref 13–17.7)
MCH RBC QN AUTO: 29.8 PG (ref 26.6–33)
MCHC RBC AUTO-ENTMCNC: 35.1 G/DL (ref 31.5–35.7)
MCV RBC AUTO: 84.7 FL (ref 79–97)
PLATELET # BLD AUTO: 202 10*3/MM3 (ref 140–450)
PMV BLD AUTO: 11.5 FL (ref 6–12)
POTASSIUM SERPL-SCNC: 4.6 MMOL/L (ref 3.5–5.2)
RBC # BLD AUTO: 3.26 10*6/MM3 (ref 4.14–5.8)
SODIUM SERPL-SCNC: 134 MMOL/L (ref 136–145)
WBC # BLD AUTO: 8.01 10*3/MM3 (ref 3.4–10.8)

## 2021-02-09 PROCEDURE — 25010000002 DROPERIDOL PER 5 MG: Performed by: INTERNAL MEDICINE

## 2021-02-09 PROCEDURE — 63710000001 INSULIN LISPRO (HUMAN) PER 5 UNITS: Performed by: INTERNAL MEDICINE

## 2021-02-09 PROCEDURE — 93010 ELECTROCARDIOGRAM REPORT: CPT | Performed by: INTERNAL MEDICINE

## 2021-02-09 PROCEDURE — 63710000001 INSULIN LISPRO (HUMAN) PER 5 UNITS: Performed by: EMERGENCY MEDICINE

## 2021-02-09 PROCEDURE — G0378 HOSPITAL OBSERVATION PER HR: HCPCS

## 2021-02-09 PROCEDURE — 63710000001 INSULIN DETEMIR PER 5 UNITS: Performed by: INTERNAL MEDICINE

## 2021-02-09 PROCEDURE — 80048 BASIC METABOLIC PNL TOTAL CA: CPT | Performed by: EMERGENCY MEDICINE

## 2021-02-09 PROCEDURE — 25010000002 ENOXAPARIN PER 10 MG: Performed by: EMERGENCY MEDICINE

## 2021-02-09 PROCEDURE — 25010000002 ENOXAPARIN PER 10 MG: Performed by: INTERNAL MEDICINE

## 2021-02-09 PROCEDURE — 25010000002 DROPERIDOL PER 5 MG: Performed by: EMERGENCY MEDICINE

## 2021-02-09 PROCEDURE — 82962 GLUCOSE BLOOD TEST: CPT

## 2021-02-09 PROCEDURE — 99212 OFFICE O/P EST SF 10 MIN: CPT | Performed by: CLINICAL NURSE SPECIALIST

## 2021-02-09 PROCEDURE — 85651 RBC SED RATE NONAUTOMATED: CPT | Performed by: INTERNAL MEDICINE

## 2021-02-09 PROCEDURE — 85027 COMPLETE CBC AUTOMATED: CPT | Performed by: EMERGENCY MEDICINE

## 2021-02-09 PROCEDURE — 93005 ELECTROCARDIOGRAM TRACING: CPT | Performed by: EMERGENCY MEDICINE

## 2021-02-09 RX ADMIN — BUMETANIDE 1 MG: 1 TABLET ORAL at 08:51

## 2021-02-09 RX ADMIN — DULOXETINE HYDROCHLORIDE 60 MG: 30 CAPSULE, DELAYED RELEASE ORAL at 08:51

## 2021-02-09 RX ADMIN — INSULIN LISPRO 40 UNITS: 100 INJECTION, SOLUTION INTRAVENOUS; SUBCUTANEOUS at 17:41

## 2021-02-09 RX ADMIN — GABAPENTIN 300 MG: 300 CAPSULE ORAL at 20:43

## 2021-02-09 RX ADMIN — INSULIN LISPRO 8 UNITS: 100 INJECTION, SOLUTION INTRAVENOUS; SUBCUTANEOUS at 08:54

## 2021-02-09 RX ADMIN — INSULIN DETEMIR 20 UNITS: 100 INJECTION, SOLUTION SUBCUTANEOUS at 20:40

## 2021-02-09 RX ADMIN — PANTOPRAZOLE SODIUM 40 MG: 40 TABLET, DELAYED RELEASE ORAL at 08:52

## 2021-02-09 RX ADMIN — SUCRALFATE 1 G: 1 TABLET ORAL at 08:52

## 2021-02-09 RX ADMIN — OXYCODONE HYDROCHLORIDE AND ACETAMINOPHEN 1 TABLET: 10; 325 TABLET ORAL at 08:52

## 2021-02-09 RX ADMIN — CARVEDILOL 3.12 MG: 3.12 TABLET, FILM COATED ORAL at 17:34

## 2021-02-09 RX ADMIN — CARVEDILOL 3.12 MG: 3.12 TABLET, FILM COATED ORAL at 08:51

## 2021-02-09 RX ADMIN — LOSARTAN POTASSIUM 100 MG: 50 TABLET, FILM COATED ORAL at 08:51

## 2021-02-09 RX ADMIN — TRAZODONE HYDROCHLORIDE 100 MG: 100 TABLET ORAL at 20:42

## 2021-02-09 RX ADMIN — ENOXAPARIN SODIUM 40 MG: 40 INJECTION SUBCUTANEOUS at 08:52

## 2021-02-09 RX ADMIN — SUCRALFATE 1 G: 1 TABLET ORAL at 20:42

## 2021-02-09 RX ADMIN — TAMSULOSIN HYDROCHLORIDE 0.4 MG: 0.4 CAPSULE ORAL at 08:51

## 2021-02-09 RX ADMIN — SODIUM CHLORIDE 50 ML/HR: 9 INJECTION, SOLUTION INTRAVENOUS at 17:42

## 2021-02-09 RX ADMIN — SUCRALFATE 1 G: 1 TABLET ORAL at 17:34

## 2021-02-09 RX ADMIN — INSULIN LISPRO 10 UNITS: 100 INJECTION, SOLUTION INTRAVENOUS; SUBCUTANEOUS at 17:35

## 2021-02-09 RX ADMIN — GABAPENTIN 300 MG: 300 CAPSULE ORAL at 08:52

## 2021-02-09 RX ADMIN — ENOXAPARIN SODIUM 40 MG: 40 INJECTION SUBCUTANEOUS at 17:34

## 2021-02-09 RX ADMIN — INSULIN LISPRO 5 UNITS: 100 INJECTION, SOLUTION INTRAVENOUS; SUBCUTANEOUS at 12:51

## 2021-02-09 RX ADMIN — SODIUM CHLORIDE 100 ML/HR: 9 INJECTION, SOLUTION INTRAVENOUS at 03:41

## 2021-02-09 RX ADMIN — OXYCODONE HYDROCHLORIDE AND ACETAMINOPHEN 1 TABLET: 10; 325 TABLET ORAL at 20:43

## 2021-02-09 RX ADMIN — SODIUM CHLORIDE, PRESERVATIVE FREE 10 ML: 5 INJECTION INTRAVENOUS at 20:42

## 2021-02-09 RX ADMIN — SODIUM CHLORIDE, PRESERVATIVE FREE 10 ML: 5 INJECTION INTRAVENOUS at 08:57

## 2021-02-09 RX ADMIN — DONEPEZIL HYDROCHLORIDE 5 MG: 5 TABLET, FILM COATED ORAL at 20:43

## 2021-02-09 RX ADMIN — ASPIRIN 81 MG: 81 TABLET, FILM COATED ORAL at 08:51

## 2021-02-09 RX ADMIN — SUCRALFATE 1 G: 1 TABLET ORAL at 12:51

## 2021-02-09 RX ADMIN — DROPERIDOL 1.25 MG: 2.5 INJECTION, SOLUTION INTRAMUSCULAR; INTRAVENOUS at 10:59

## 2021-02-09 RX ADMIN — ROSUVASTATIN CALCIUM 40 MG: 20 TABLET, FILM COATED ORAL at 20:43

## 2021-02-09 RX ADMIN — INSULIN LISPRO 25 UNITS: 100 INJECTION, SOLUTION INTRAVENOUS; SUBCUTANEOUS at 08:53

## 2021-02-09 RX ADMIN — GABAPENTIN 300 MG: 300 CAPSULE ORAL at 16:16

## 2021-02-10 ENCOUNTER — ANESTHESIA (OUTPATIENT)
Dept: GASTROENTEROLOGY | Facility: HOSPITAL | Age: 66
End: 2021-02-10

## 2021-02-10 ENCOUNTER — ANESTHESIA EVENT (OUTPATIENT)
Dept: GASTROENTEROLOGY | Facility: HOSPITAL | Age: 66
End: 2021-02-10

## 2021-02-10 ENCOUNTER — TELEPHONE (OUTPATIENT)
Dept: ENDOCRINOLOGY | Facility: CLINIC | Age: 66
End: 2021-02-10

## 2021-02-10 VITALS
DIASTOLIC BLOOD PRESSURE: 65 MMHG | HEART RATE: 91 BPM | WEIGHT: 315 LBS | OXYGEN SATURATION: 99 % | SYSTOLIC BLOOD PRESSURE: 132 MMHG | TEMPERATURE: 97.6 F | RESPIRATION RATE: 16 BRPM | BODY MASS INDEX: 42.66 KG/M2 | HEIGHT: 72 IN

## 2021-02-10 PROBLEM — R11.2 NAUSEA AND VOMITING: Status: ACTIVE | Noted: 2021-02-05

## 2021-02-10 LAB
ANION GAP SERPL CALCULATED.3IONS-SCNC: 9 MMOL/L (ref 5–15)
BUN SERPL-MCNC: 52 MG/DL (ref 8–23)
BUN/CREAT SERPL: 21.8 (ref 7–25)
CALCIUM SPEC-SCNC: 9.1 MG/DL (ref 8.6–10.5)
CHLORIDE SERPL-SCNC: 102 MMOL/L (ref 98–107)
CO2 SERPL-SCNC: 26 MMOL/L (ref 22–29)
CREAT SERPL-MCNC: 2.38 MG/DL (ref 0.76–1.27)
GFR SERPL CREATININE-BSD FRML MDRD: 28 ML/MIN/1.73
GLUCOSE BLDC GLUCOMTR-MCNC: 227 MG/DL (ref 70–130)
GLUCOSE BLDC GLUCOMTR-MCNC: 254 MG/DL (ref 70–130)
GLUCOSE SERPL-MCNC: 279 MG/DL (ref 65–99)
HOLD SPECIMEN: NORMAL
POTASSIUM SERPL-SCNC: 4.6 MMOL/L (ref 3.5–5.2)
QT INTERVAL: 410 MS
QTC INTERVAL: 433 MS
SODIUM SERPL-SCNC: 137 MMOL/L (ref 136–145)

## 2021-02-10 PROCEDURE — G0378 HOSPITAL OBSERVATION PER HR: HCPCS

## 2021-02-10 PROCEDURE — 63710000001 INSULIN LISPRO (HUMAN) PER 5 UNITS: Performed by: EMERGENCY MEDICINE

## 2021-02-10 PROCEDURE — 43235 EGD DIAGNOSTIC BRUSH WASH: CPT | Performed by: INTERNAL MEDICINE

## 2021-02-10 PROCEDURE — 82962 GLUCOSE BLOOD TEST: CPT

## 2021-02-10 PROCEDURE — 25010000002 PROPOFOL 10 MG/ML EMULSION: Performed by: NURSE ANESTHETIST, CERTIFIED REGISTERED

## 2021-02-10 PROCEDURE — 80048 BASIC METABOLIC PNL TOTAL CA: CPT | Performed by: NURSE PRACTITIONER

## 2021-02-10 PROCEDURE — 63710000001 INSULIN LISPRO (HUMAN) PER 5 UNITS: Performed by: INTERNAL MEDICINE

## 2021-02-10 RX ORDER — CARVEDILOL 3.12 MG/1
3.12 TABLET ORAL 2 TIMES DAILY WITH MEALS
Qty: 60 TABLET | Refills: 0 | Status: SHIPPED | OUTPATIENT
Start: 2021-02-10 | End: 2021-03-19 | Stop reason: SDUPTHER

## 2021-02-10 RX ORDER — LIDOCAINE HYDROCHLORIDE 10 MG/ML
0.5 INJECTION, SOLUTION EPIDURAL; INFILTRATION; INTRACAUDAL; PERINEURAL ONCE AS NEEDED
Status: DISCONTINUED | OUTPATIENT
Start: 2021-02-10 | End: 2021-02-10 | Stop reason: HOSPADM

## 2021-02-10 RX ORDER — LIDOCAINE HYDROCHLORIDE 20 MG/ML
INJECTION, SOLUTION EPIDURAL; INFILTRATION; INTRACAUDAL; PERINEURAL AS NEEDED
Status: DISCONTINUED | OUTPATIENT
Start: 2021-02-10 | End: 2021-02-10 | Stop reason: SURG

## 2021-02-10 RX ORDER — PROPOFOL 10 MG/ML
VIAL (ML) INTRAVENOUS AS NEEDED
Status: DISCONTINUED | OUTPATIENT
Start: 2021-02-10 | End: 2021-02-10 | Stop reason: SURG

## 2021-02-10 RX ORDER — SODIUM CHLORIDE 0.9 % (FLUSH) 0.9 %
10 SYRINGE (ML) INJECTION AS NEEDED
Status: DISCONTINUED | OUTPATIENT
Start: 2021-02-10 | End: 2021-02-10 | Stop reason: HOSPADM

## 2021-02-10 RX ORDER — SODIUM CHLORIDE 9 MG/ML
500 INJECTION, SOLUTION INTRAVENOUS CONTINUOUS PRN
Status: DISCONTINUED | OUTPATIENT
Start: 2021-02-10 | End: 2021-02-10 | Stop reason: HOSPADM

## 2021-02-10 RX ADMIN — LOSARTAN POTASSIUM 100 MG: 50 TABLET, FILM COATED ORAL at 09:01

## 2021-02-10 RX ADMIN — BUMETANIDE 1 MG: 1 TABLET ORAL at 09:01

## 2021-02-10 RX ADMIN — PANTOPRAZOLE SODIUM 40 MG: 40 TABLET, DELAYED RELEASE ORAL at 09:02

## 2021-02-10 RX ADMIN — INSULIN LISPRO 8 UNITS: 100 INJECTION, SOLUTION INTRAVENOUS; SUBCUTANEOUS at 09:04

## 2021-02-10 RX ADMIN — DULOXETINE HYDROCHLORIDE 60 MG: 30 CAPSULE, DELAYED RELEASE ORAL at 09:01

## 2021-02-10 RX ADMIN — PROPOFOL 100 MG: 10 INJECTION, EMULSION INTRAVENOUS at 10:35

## 2021-02-10 RX ADMIN — INSULIN LISPRO 25 UNITS: 100 INJECTION, SOLUTION INTRAVENOUS; SUBCUTANEOUS at 09:03

## 2021-02-10 RX ADMIN — SUCRALFATE 1 G: 1 TABLET ORAL at 12:11

## 2021-02-10 RX ADMIN — SUCRALFATE 1 G: 1 TABLET ORAL at 09:02

## 2021-02-10 RX ADMIN — OXYCODONE HYDROCHLORIDE AND ACETAMINOPHEN 1 TABLET: 10; 325 TABLET ORAL at 09:02

## 2021-02-10 RX ADMIN — LIDOCAINE HYDROCHLORIDE 100 MG: 20 INJECTION, SOLUTION EPIDURAL; INFILTRATION; INTRACAUDAL; PERINEURAL at 10:35

## 2021-02-10 RX ADMIN — TAMSULOSIN HYDROCHLORIDE 0.4 MG: 0.4 CAPSULE ORAL at 09:01

## 2021-02-10 RX ADMIN — LIDOCAINE HYDROCHLORIDE 100 MG: 20 INJECTION, SOLUTION EPIDURAL; INFILTRATION; INTRACAUDAL; PERINEURAL at 10:36

## 2021-02-10 RX ADMIN — GABAPENTIN 300 MG: 300 CAPSULE ORAL at 09:02

## 2021-02-10 RX ADMIN — GABAPENTIN 300 MG: 300 CAPSULE ORAL at 15:11

## 2021-02-10 RX ADMIN — INSULIN LISPRO 5 UNITS: 100 INJECTION, SOLUTION INTRAVENOUS; SUBCUTANEOUS at 12:11

## 2021-02-10 RX ADMIN — SODIUM CHLORIDE, PRESERVATIVE FREE 10 ML: 5 INJECTION INTRAVENOUS at 09:04

## 2021-02-10 RX ADMIN — ASPIRIN 81 MG: 81 TABLET, FILM COATED ORAL at 09:01

## 2021-02-10 RX ADMIN — CARVEDILOL 3.12 MG: 3.12 TABLET, FILM COATED ORAL at 09:01

## 2021-02-10 NOTE — TELEPHONE ENCOUNTER
Spoke with Lolly Chance, Diabetes Educator at [facility]. Confirmed pt's current insulin regimen.   Humulin U 500   90 units - 30 minutes before breakfast ---increased at last visit  65 units - 30 minutes before supper --- increased at last visit    (If you have a low bg in the night decrease to 55 unit)    Also, taking Trulicity 0.75 mg once weekly--continue  And Dylan added the Afrezza to use with each meal (inhaled insulin- rescue)

## 2021-02-10 NOTE — TELEPHONE ENCOUNTER
Lolly Chance, diabetes educator at [facility] left a vm asking for you to call her on her personal cellphone at [phone] regarding this patient

## 2021-02-10 NOTE — ANESTHESIA PREPROCEDURE EVALUATION
Anesthesia Evaluation     Patient summary reviewed and Nursing notes reviewed   NPO Solid Status: > 8 hours  NPO Liquid Status: > 8 hours           Airway   Mallampati: III  TM distance: >3 FB  Neck ROM: full  Possible difficult intubation  Dental      Pulmonary    (+) sleep apnea on CPAP,   Cardiovascular   Exercise tolerance: poor (<4 METS)    ECG reviewed    (+) hypertension, past MI , CAD, CABG, hyperlipidemia,     ROS comment: Pt admitted with chest pain, LHC performed 2/8/21, no intervention performed.   Discussed with Dr Charles, pt has severe native disease but CABG grafts all open, likely non cardiac source of chest pain    Neuro/Psych  GI/Hepatic/Renal/Endo    (+) morbid obesity, GERD,  renal disease CRI, diabetes mellitus type 2 using insulin,     Musculoskeletal     Abdominal    Substance History      OB/GYN          Other   arthritis, blood dyscrasia anemia,                   Anesthesia Plan    ASA 3     general     intravenous induction     Anesthetic plan, all risks, benefits, and alternatives have been provided, discussed and informed consent has been obtained with: patient.

## 2021-02-10 NOTE — ANESTHESIA POSTPROCEDURE EVALUATION
Patient: Erick Luong    Procedure Summary     Date: 02/10/21 Room / Location: [facility] ENDOSCOPY 4 / [facility] ENDOSCOPY    Anesthesia Start: 1030 Anesthesia Stop: 1042    Procedure: ESOPHAGOGASTRODUODENOSCOPY WITH ANESTHESIA (N/A ) Diagnosis:       Chest pain, unspecified type      Nausea and vomiting, intractability of vomiting not specified, unspecified vomiting type      (Chest pain, unspecified type [R07.9])      (Nausea and vomiting, intractability of vomiting not specified, unspecified vomiting type [R11.2])    Surgeon: Cristopher Hannah MD Provider: Rogelio Duke CRNA    Anesthesia Type: general ASA Status: 3          Anesthesia Type: general    Vitals  Vitals Value Taken Time   /72 02/10/21 1040   Temp     Pulse 78 02/10/21 1042   Resp 14 02/10/21 1041   SpO2 95 % 02/10/21 1042   Vitals shown include unvalidated device data.        Post Anesthesia Care and Evaluation    Patient location during evaluation: PHASE II  Patient participation: complete - patient participated  Level of consciousness: sleepy but conscious  Pain score: 0  Pain management: adequate  Airway patency: patent  Anesthetic complications: No anesthetic complications  PONV Status: none  Cardiovascular status: acceptable  Respiratory status: acceptable  Hydration status: acceptable

## 2021-02-11 ENCOUNTER — READMISSION MANAGEMENT (OUTPATIENT)
Dept: CALL CENTER | Facility: HOSPITAL | Age: 66
End: 2021-02-11

## 2021-02-11 NOTE — OUTREACH NOTE
Prep Survey      Responses   [facility] facility patient discharged from?  [facility]   Is LACE score < 7 ?  No   Emergency Room discharge w/ pulse ox?  No   Eligibility  Readm Mgmt   Discharge diagnosis  Chest pain [s/p left heart cath and EGD]   Does the patient have one of the following disease processes/diagnoses(primary or secondary)?  Other   Does the patient have Home health ordered?  No   Is there a DME ordered?  No   Comments regarding appointments  Per AVS   Medication alerts for this patient  Per AVS   Prep survey completed?  Yes          Lee Ann South RN

## 2021-02-12 ENCOUNTER — READMISSION MANAGEMENT (OUTPATIENT)
Dept: CALL CENTER | Facility: HOSPITAL | Age: 66
End: 2021-02-12

## 2021-02-12 NOTE — OUTREACH NOTE
Medical Week 1 Survey      Responses   [facility] patient discharged from?  [place]   Does the patient have one of the following disease processes/diagnoses(primary or secondary)?  Other   Week 1 attempt successful?  Yes   Call start time  1226   Call end time  1238   Discharge diagnosis  Chest pain   Person spoke with today (if not patient) and relationship  stephan Franco   Meds reviewed with patient/caregiver?  Yes   Is the patient having any side effects they believe may be caused by any medication additions or changes?  No   Does the patient have all medications ordered at discharge?  Yes   Is the patient taking all medications as directed (includes completed medication regime)?  Yes   Does the patient have a primary care provider?   Yes   Does the patient have an appointment with their PCP within 7 days of discharge?  Yes   Has the patient kept scheduled appointments due by today?  N/A   Psychosocial issues?  No   Did the patient receive a copy of their discharge instructions?  Yes   Nursing interventions  Reviewed instructions with patient   What is the patient's perception of their health status since discharge?  Same   Is the patient/caregiver able to teach back the hierarchy of who to call/visit for symptoms/problems? PCP, Specialist, Home health nurse, Urgent Care, ED, 911  Yes   Additional teach back comments  137/80 Wife says he is still having pain and nausea and blood sugars all over the place. He just doesn't look good. He does not want to go back to the ER.   Week 1 call completed?  Yes   Is the patient interested in additional calls from an ambulatory ?  NOTE:  applies to high risk patients requiring additional follow-up.  No   Revoked  No further contact(revokes)-requires comment   Graduated/Revoked comments  Wife is [employer] employee. Aware of Nurse Call Center.          Ree Boone RN

## 2021-02-15 ENCOUNTER — HOSPITAL ENCOUNTER (OUTPATIENT)
Dept: NUCLEAR MEDICINE | Facility: HOSPITAL | Age: 66
End: 2021-02-15

## 2021-02-16 ENCOUNTER — APPOINTMENT (OUTPATIENT)
Dept: CARDIOLOGY | Facility: HOSPITAL | Age: 66
End: 2021-02-16

## 2021-02-16 ENCOUNTER — DOCUMENTATION (OUTPATIENT)
Dept: ENDOCRINOLOGY | Facility: CLINIC | Age: 66
End: 2021-02-16

## 2021-02-23 RX ORDER — PROCHLORPERAZINE 25 MG/1
SUPPOSITORY RECTAL AS NEEDED
Qty: 9 EACH | Refills: 3 | Status: SHIPPED | OUTPATIENT
Start: 2021-02-23 | End: 2021-11-18

## 2021-02-23 RX ORDER — PROCHLORPERAZINE 25 MG/1
1 SUPPOSITORY RECTAL ONCE
Qty: 1 EACH | Refills: 3 | Status: SHIPPED | OUTPATIENT
Start: 2021-02-23 | End: 2021-02-23

## 2021-02-23 RX ORDER — PROCHLORPERAZINE 25 MG/1
1 SUPPOSITORY RECTAL ONCE
Qty: 1 EACH | Refills: 1 | Status: SHIPPED | OUTPATIENT
Start: 2021-02-23 | End: 2021-02-23

## 2021-03-02 ENCOUNTER — OFFICE VISIT (OUTPATIENT)
Dept: ENDOCRINOLOGY | Facility: CLINIC | Age: 66
End: 2021-03-02

## 2021-03-02 VITALS
HEIGHT: 72 IN | OXYGEN SATURATION: 99 % | DIASTOLIC BLOOD PRESSURE: 64 MMHG | HEART RATE: 85 BPM | BODY MASS INDEX: 42.66 KG/M2 | SYSTOLIC BLOOD PRESSURE: 128 MMHG | WEIGHT: 315 LBS

## 2021-03-02 DIAGNOSIS — I10 HTN (HYPERTENSION), BENIGN: ICD-10-CM

## 2021-03-02 DIAGNOSIS — E55.9 VITAMIN D DEFICIENCY: ICD-10-CM

## 2021-03-02 DIAGNOSIS — Z79.4 TYPE 2 DIABETES MELLITUS WITH HYPERGLYCEMIA, WITH LONG-TERM CURRENT USE OF INSULIN (HCC): Primary | ICD-10-CM

## 2021-03-02 DIAGNOSIS — E11.65 TYPE 2 DIABETES MELLITUS WITH HYPERGLYCEMIA, WITH LONG-TERM CURRENT USE OF INSULIN (HCC): Primary | ICD-10-CM

## 2021-03-02 PROCEDURE — 95251 CONT GLUC MNTR ANALYSIS I&R: CPT | Performed by: NURSE PRACTITIONER

## 2021-03-02 PROCEDURE — 99214 OFFICE O/P EST MOD 30 MIN: CPT | Performed by: NURSE PRACTITIONER

## 2021-03-02 RX ORDER — PROCHLORPERAZINE 25 MG/1
1 SUPPOSITORY RECTAL ONCE
Qty: 1 EACH | Refills: 0 | Status: SHIPPED | OUTPATIENT
Start: 2021-03-02 | End: 2021-03-02

## 2021-03-02 RX ORDER — PROCHLORPERAZINE 25 MG/1
1 SUPPOSITORY RECTAL
Qty: 1 EACH | Refills: 3 | Status: SHIPPED | OUTPATIENT
Start: 2021-03-02 | End: 2021-11-18

## 2021-03-02 RX ORDER — PROCHLORPERAZINE 25 MG/1
1 SUPPOSITORY RECTAL AS NEEDED
Qty: 9 EACH | Refills: 3 | Status: SHIPPED | OUTPATIENT
Start: 2021-03-02 | End: 2021-11-18

## 2021-03-02 NOTE — PROGRESS NOTES
"Chief Complaint  Diabetes    Subjective          Erick Luong presents to [facility] ENDOCRINOLOGY  History of Present Illness     In office visit       Referring provider      Del Shetty MD     65 year old male presents for follow up     Reason diabetes mellitus type 2         Duration -- diagnosed at age 24 years     Timing constant      Quality not controlled      Severity high      Complications --he has a foot ulcer and is currently following with podiatry     Macrovascular complication -  CAD, CABG times 4      Microvascular complications --neuropathy, CKD stage IV         Current regimen --- insulin         Current glucose monitoring device -- fingerstick's      Blood glucose readings      Checks 4 times daily      [employer] 2 personal use     Download and reviewed     Dated from Feb. 17 to March 2, 2021    Average bg 223      Target 31%     High 29%     Very high 38%     Low 2%     Very low 0 %                 Review of Systems   Constitutional: Positive for fatigue.   All other systems reviewed and are negative.    Objective   Vital Signs:   /64   Pulse 85   Ht 182.9 cm (72\")   Wt (!) 155 kg (341 lb 9.6 oz)   SpO2 99%   BMI 46.33 kg/m²     Physical Exam  Constitutional:       Appearance: Normal appearance.   HENT:      Head: Normocephalic.      Nose: Nose normal.   Eyes:      Conjunctiva/sclera: Conjunctivae normal.      Pupils: Pupils are equal, round, and reactive to light.   Neck:      Musculoskeletal: Normal range of motion and neck supple.   Cardiovascular:      Rate and Rhythm: Regular rhythm.      Heart sounds: Normal heart sounds.   Pulmonary:      Breath sounds: Normal breath sounds.   Musculoskeletal: Normal range of motion.   Skin:     Coloration: Skin is not pale.   Neurological:      General: No focal deficit present.      Mental Status: He is alert.   Psychiatric:         Mood and Affect: Mood normal.         Thought Content: Thought content normal.         " Judgment: Judgment normal.        Result Review :   The following data was reviewed by: SUSAN Farah on 03/02/2021:  Common labs    Common Labsle 2/8/21 2/8/21 2/9/21 2/9/21 2/10/21    0420 0420 0417 0417    Glucose  243 (A)  255 (A) 279 (A)   BUN  50 (A)  52 (A) 52 (A)   Creatinine  2.41 (A)  2.38 (A) 2.38 (A)   eGFR Non African Am  27 (A)  28 (A) 28 (A)   Sodium  132 (A)  134 (A) 137   Potassium  4.4  4.6 4.6   Chloride  97 (A)  98 102   Calcium  9.0  8.8 9.1   WBC 7.54  8.01     Hemoglobin 9.9 (A)  9.7 (A)     Hematocrit 29.4 (A)  27.6 (A)     Platelets 197  202     (A) Abnormal value       Comments are available for some flowsheets but are not being displayed.                     Assessment and Plan    Diagnoses and all orders for this visit:    1. Type 2 diabetes mellitus with hyperglycemia, with long-term current use of insulin (CMS/[facility]) (Primary)    2. HTN (hypertension), benign    3. Vitamin D deficiency    Other orders  -     Continuous Blood Gluc Transmit (Dexcom G6 Transmitter) misc; Use 1 each Every 3 (Three) Months.  Dispense: 1 each; Refill: 3  -     Continuous Blood Gluc Sensor (Dexcom G6 Sensor); Use 1 each As Needed (glucose control). Every 10 days  Dispense: 9 each; Refill: 3  -     Continuous Blood Gluc  (Dexcom G6 ) device; 1 each 1 (One) Time for 1 dose.  Dispense: 1 each; Refill: 0           Glycemic management:      Diabetes mellitus type 2    Lab Results   Component Value Date    HGBA1C 9.70 (H) 02/06/2021            Dirk 2 personal use     Download and reviewed     Dated from Feb. 17 to March 2, 2021    Target 31%     High 29%     Very high 38%     Low 2%     Very low 0 %     Highs most the time but especially postprandial hyperglycemia      Increase afreeza           Humulin U 500             taking 90 units 30 minutes before breakfast          take 70 units 30 minutes before supper -     If you have a low bg in the night decrease to 65    No change in the U5  100 dosage         Trulicity 0.75 mg once weekly-keep taking    Side effects can be nausea     If nauseated while eating, eat less,      If vomiting or abdominal pain stop medication and call office            Use the humalog for rescue      Afrezza to use with each meal  ( inhaled insulin) ---taking 4 units increase to 12 units         Dexcom approved              Hyperlipidemia      Crestor 40 mg one at night          Blood pressure management:     Hypertension      Taking bumex 1mg         Microvascular complications     Neuropathy     Taking gabapentin 300 mg tid      Last eye exam -- 2020,     Cataract surgery         Weight management     Patient's Body mass index is 46.33 kg/m². BMI is above normal parameters. Recommendations include: nutrition counseling.          Decrease caloric intake by 500 calories            Follow Up   Return in about 3 months (around 6/2/2021) for Recheck.  Patient was given instructions and counseling regarding his condition or for health maintenance advice. Please see specific information pulled into the AVS if appropriate.

## 2021-03-11 ENCOUNTER — OFFICE VISIT (OUTPATIENT)
Dept: CARDIOLOGY | Facility: CLINIC | Age: 66
End: 2021-03-11

## 2021-03-11 VITALS
BODY MASS INDEX: 42.66 KG/M2 | WEIGHT: 315 LBS | HEIGHT: 72 IN | OXYGEN SATURATION: 99 % | SYSTOLIC BLOOD PRESSURE: 120 MMHG | HEART RATE: 100 BPM | DIASTOLIC BLOOD PRESSURE: 80 MMHG

## 2021-03-11 DIAGNOSIS — I10 HTN (HYPERTENSION), BENIGN: ICD-10-CM

## 2021-03-11 DIAGNOSIS — I25.10 CORONARY ARTERY DISEASE INVOLVING NATIVE CORONARY ARTERY OF NATIVE HEART WITHOUT ANGINA PECTORIS: Primary | ICD-10-CM

## 2021-03-11 DIAGNOSIS — R07.89 ATYPICAL CHEST PAIN: ICD-10-CM

## 2021-03-11 PROCEDURE — 93000 ELECTROCARDIOGRAM COMPLETE: CPT | Performed by: INTERNAL MEDICINE

## 2021-03-11 PROCEDURE — 99214 OFFICE O/P EST MOD 30 MIN: CPT | Performed by: INTERNAL MEDICINE

## 2021-03-11 RX ORDER — BUMETANIDE 1 MG/1
1 TABLET ORAL 2 TIMES DAILY
Status: ON HOLD | COMMUNITY
End: 2021-05-10

## 2021-03-11 RX ORDER — DOXYCYCLINE HYCLATE 100 MG/1
100 CAPSULE ORAL 2 TIMES DAILY
Status: ON HOLD | COMMUNITY
End: 2021-05-10

## 2021-03-11 RX ORDER — METOLAZONE 5 MG/1
5 TABLET ORAL DAILY
Status: ON HOLD | COMMUNITY
End: 2021-05-10

## 2021-03-11 NOTE — PROGRESS NOTES
Subjective:     Encounter Date:03/11/2021      Patient ID: Erick Luong is a 65 y.o. male with a history of coronary artery disease, status post previous coronary artery bypass grafting (left internal mammary artery graft to left anterior descending coronary artery; saphenous vein graft to diagonal; saphenous vein graft to obtuse marginal; saphenous vein graft to right coronary artery), chronic noncardiac chest pain, morbid obesity, type II diabetes mellitus, chronic diastolic dysfunction, hypertension, hyperlipidemia who presents today for follow-up.    Chief Complaint: Hospital follow-up    This patient presents today for hospital follow-up.  He was recently hospitalized with chest discomfort, nausea and vomiting.  He underwent a stress test which showed some perfusion defects and then a subsequent cardiac catheterization, showing no suitable targets for PCI and it was thought that the patient's chest discomfort might have been noncardiac.  Patient is known to have a history of noncardiac chest pain dating back numerous years.  The patient, during the hospital stay also had an endoscopy.  It was thought that possibly slow gastric emptying was contributing to his nausea and vomiting.  Since being out of the hospital, he has been doing reasonably well.  He continues to have atypical chest discomfort, sharp, fleeting, waxing waning, no obvious provoking or alleviating factors.  He says that this may have been somewhat improved since being out of the hospital.  He has some mild shortness of breath and dyspnea exertion which are both chronic.  Also some waxing waning lower extremity edema.  Blood pressure has been reasonably well controlled lately but he has had episodes in the past where it has been significantly high and significantly low, leading to lightheadedness, dizziness and even syncope in the past.  He denies any syncopal episodes recently.  No palpitations.  No problems or side effects of current  medications.    Coronary Artery Disease  Presents for follow-up visit. Symptoms include chest pain, leg swelling and shortness of breath. Pertinent negatives include no dizziness or palpitations. The symptoms have been stable. Compliance with diet is variable. Compliance with exercise is poor. Compliance with medications is good.       Current Outpatient Medications:   •  aspirin 81 MG EC tablet, Take 81 mg by mouth Daily., Disp: , Rfl:   •  bumetanide (BUMEX) 1 MG tablet, Take 1 mg by mouth 2 (Two) Times a Day., Disp: , Rfl:   •  carvedilol (COREG) 3.125 MG tablet, Take 1 tablet by mouth 2 (Two) Times a Day With Meals., Disp: 60 tablet, Rfl: 0  •  colchicine 0.6 MG tablet, Take 1 tablet by mouth 2 (two) times a day., Disp: 28 tablet, Rfl: 0  •  Continuous Blood Gluc Sensor (Dexcom G6 Sensor), As Needed (glucose control). Every 10 daysDexcom G6 Sensor Kit [address]-43 Use as directed for continuous glucose monitoring, Disp: 9 each, Rfl: 3  •  Continuous Blood Gluc Sensor (Dexcom G6 Sensor), Use 1 each As Needed (glucose control). Every 10 days, Disp: 9 each, Rfl: 3  •  Continuous Blood Gluc Transmit (Dexcom G6 Transmitter) misc, Use 1 each Every 3 (Three) Months., Disp: 1 each, Rfl: 3  •  cyclobenzaprine (FLEXERIL) 5 MG tablet, Take 1 tablet by mouth 2 (Two) Times a Day As Needed for muscle spasms, Disp: 60 tablet, Rfl: 5  •  doxycycline (VIBRAMYCIN) 100 MG capsule, Take 100 mg by mouth 2 (Two) Times a Day., Disp: , Rfl:   •  Dulaglutide 0.75 MG/0.5ML solution pen-injector, Inject 0.75 mg under the skin into the appropriate area as directed 1 (One) Time Per Week., Disp: 4 pen, Rfl: 11  •  DULoxetine HCl (DRIZALMA) 30 MG capsule, Take 90 mg by mouth Daily., Disp: , Rfl:   •  gabapentin (NEURONTIN) 300 MG capsule, TAKE 1 CAPSULE BY MOUTH THREE TIMES A DAY, Disp: 90 capsule, Rfl: 5  •  Insulin Lispro, 1 Unit Dial, (HumaLOG KwikPen) 100 UNIT/ML solution pen-injector, Inject according to sliding scale:  glucose=151-200  use 2 UNITS; glucose=201-250 use 4 units; glucose=251-300 use 6 units; above 301 use 8 units, Disp: 18 mL, Rfl: 11  •  Insulin Pen Needle 31G X 5 MM misc, Use 4 times a day., Disp: 400 each, Rfl: 3  •  Insulin Pen Needle 31G X 5 MM misc, Use as directed 4 times daily, Disp: 400 each, Rfl: 3  •  insulin regular (humuLIN R) 500 UNIT/ML CONCENTRATED injection, Inject 100 Units under the skin 3 (Three) Times a Day Before Meals. Packaging states 100 units with regular meals; 120 units with large meals or 360 units daily, Disp: , Rfl:   •  Insulin Regular Human 4 & 8 & 12 units powder, Inhale 4-32 Units 3 (Three) Times a Day With Meals. 4-32 units TID , max dose 96 units daily, Disp: 360 each, Rfl: 11  •  Insulin Regular Human, Conc, (HumuLIN R U-500 KwikPen) 500 UNIT/ML solution pen-injector CONCENTRATED injection, Inject 100 units under the skin into the appropriate area as directed 3 (Three) Times a Day with regular meals; Inject 120 units with larger meals, Disp: 24 mL, Rfl: 11  •  irbesartan (AVAPRO) 300 MG tablet, Take 1 tablet by mouth Daily., Disp: 90 tablet, Rfl: 1  •  lactulose (CHRONULAC) 10 GM/15ML solution, Take 20 g by mouth 3 (Three) Times a Day As Needed (constipation)., Disp: , Rfl:   •  metOLazone (ZAROXOLYN) 5 MG tablet, Take 5 mg by mouth Daily., Disp: , Rfl:   •  oxyCODONE-acetaminophen (PERCOCET)  MG per tablet, TAKE 1 TABLET BY MOUTH TWICE A DAY, Disp: 60 tablet, Rfl: 0  •  pantoprazole (PROTONIX) 40 MG EC tablet, Take 1 tablet by mouth Daily., Disp: 90 tablet, Rfl: 3  •  rosuvastatin (CRESTOR) 40 MG tablet, Take 1 tablet by mouth Every Night., Disp: 90 tablet, Rfl: 1  •  sucralfate (CARAFATE) 1 g tablet, Take 1 tablet by mouth 4 (Four) Times a Day before meals, Disp: 120 tablet, Rfl: 0  •  tamsulosin (FLOMAX) 0.4 MG capsule 24 hr capsule, Take 1 capsule by mouth Daily., Disp: 30 capsule, Rfl: 11  •  traZODone (DESYREL) 100 MG tablet, Take 1 tablet by mouth Every Night., Disp: 90 tablet, Rfl:  "2  •  bumetanide (BUMEX) 1 MG tablet, Take 1 tablet by mouth Daily for 30 days., Disp: 30 tablet, Rfl: 0    Allergies   Allergen Reactions   • Bactrim [Sulfamethoxazole-Trimethoprim] Other (See Comments)     \"RENAL FAILURE\"   • Clindamycin Itching   • Vancomycin Itching   • Metronidazole Rash     Social History     Tobacco Use   • Smoking status: Never Smoker   • Smokeless tobacco: Never Used   • Tobacco comment: smoked in highschool   Substance Use Topics   • Alcohol use: No     Review of Systems   Constitutional: Positive for malaise/fatigue. Negative for fever and weight loss.   Cardiovascular: Positive for chest pain, dyspnea on exertion and leg swelling. Negative for orthopnea, palpitations, paroxysmal nocturnal dyspnea and syncope.   Respiratory: Positive for shortness of breath. Negative for cough and wheezing.    Hematologic/Lymphatic: Negative for bleeding problem. Does not bruise/bleed easily.   Gastrointestinal: Positive for abdominal pain and nausea. Negative for vomiting.   Neurological: Positive for weakness. Negative for dizziness, headaches and loss of balance.       ECG 12 Lead    Date/Time: 3/11/2021 6:00 PM  Performed by: Emeka Garay MD  Authorized by: Emeka Garay MD   Comparison: compared with previous ECG from 2/9/2021  Similar to previous ECG  Rhythm: sinus rhythm  Rate: normal  BPM: 97  Conduction: 1st degree AV block and non-specific intraventricular conduction delay  QRS axis: left    Clinical impression: abnormal EKG             Objective:     Vitals reviewed.   Constitutional:       General: Not in acute distress.     Appearance: Healthy appearance. Well-developed.   Eyes:      Extraocular Movements: Extraocular movements intact.      Pupils: Pupils are equal, round, and reactive to light.   HENT:      Head: Normocephalic and atraumatic.   Neck:      Thyroid: No thyroid mass.      Vascular: No JVD.   Pulmonary:      Effort: Pulmonary effort is normal.      Breath " "sounds: Normal breath sounds. No wheezing. No rhonchi. No rales.   Cardiovascular:      Normal rate. Regular rhythm.      Murmurs: There is no murmur.      No gallop.   Pulses:     Intact distal pulses.   Edema:     Peripheral edema present.  Abdominal:      General: Abdomen is protuberant. Bowel sounds are normal. There is no distension.      Palpations: Abdomen is soft.      Tenderness: There is no abdominal tenderness.   Musculoskeletal: Normal range of motion.      Extremities: No clubbing present.     Cervical back: Normal range of motion and neck supple. Skin:     General: Skin is warm and dry. There is no cyanosis.      Findings: No erythema or rash.   Neurological:      Mental Status: Alert and oriented to person, place, and time.      Cranial Nerves: No cranial nerve deficit.       /80   Pulse 100   Ht 182.9 cm (72\")   Wt (!) 150 kg (331 lb)   SpO2 99%   BMI 44.89 kg/m²     Data/Lab Review:     Lab Results   Component Value Date    CHOL 133 02/06/2021    CHLPL 133 06/16/2016    TRIG 167 (H) 02/06/2021    HDL 38 (L) 02/06/2021    LDL 67 02/06/2021     Echo 2/7/21: Ejection fraction 51-55% with mild concentric LVH, trace aortic insufficiency with mild mitral valve regurgitation.    I reviewed the images from the cardiac catheterization performed on 2/8/2021, and the results are summarized as follows: The patient has severe native multivessel coronary artery disease.  The left main coronary artery is free of any significant disease.  The LAD is occluded proximally after what appears to be either a ramus branch or a diagonal branch which is a diffusely diseased vessel.  The left circumflex is occluded near the origin.  The right coronary artery is occluded in its proximal segment.  The patient does have a patent LIMA to the LAD with a diffusely diseased downstream LAD noted.  The patient has a saphenous vein graft to the diagonal which is widely patent.  There does appear to be some degree of " stenosis after the graft insertion, however the diagonal branch is diffusely diseased.  There is also saphenous vein graft to the obtuse marginal which fills to marginal branches and is widely patent.  There is also a saphenous vein graft to the right coronary artery/PDA which is patent.      Assessment:          Diagnosis Plan   1. Coronary artery disease involving native coronary artery of native heart without angina pectoris  ECG 12 Lead   2. Atypical chest pain     3. HTN (hypertension), benign            Plan:       1.  Coronary artery disease: The patient has a history of coronary disease bypass grafting.  He has significant multivessel disease however his recent cardiac catheterization showed patent grafts.  No suitable targets for PCI.  The patient remains on aspirin, beta-blocker and statin therapies.  No changes recommended at this time.    2.  Atypical chest pain: The patient has a longstanding history of chronic atypical chest pain, dating away back to 2017.  His recent cardiac catheterization did not show any obvious culprit for the patient's chest discomfort.  No further cardiac work-up indicated at this time.    3.  Essential hypertension: Waxing and waning blood pressure is noted with significant highs and also very low readings at times with lightheadedness, dizziness and syncope occasionally although none recently.  Blood pressure has been a little more stable lately.  Continue current medications.    Patient's Body mass index is 44.89 kg/m². BMI is above normal parameters. Recommendations include: exercise counseling and nutrition counseling.    Follow-up in 6 months unless otherwise needed sooner.

## 2021-03-22 ENCOUNTER — DOCUMENTATION (OUTPATIENT)
Dept: ENDOCRINOLOGY | Facility: CLINIC | Age: 66
End: 2021-03-22

## 2021-03-22 RX ORDER — CARVEDILOL 3.12 MG/1
3.12 TABLET ORAL 2 TIMES DAILY WITH MEALS
Qty: 60 TABLET | Refills: 0 | Status: SHIPPED | OUTPATIENT
Start: 2021-03-22 | End: 2021-05-03 | Stop reason: SDUPTHER

## 2021-03-22 NOTE — PROGRESS NOTES
PA FOR DEXCOM G6 SYSTEM    Key: [id number]  Dexcom G6  device  Key: [id number]  Dexcom G6 Transmitter  Key: [id number]  Dexcom G6 Sensor

## 2021-03-22 NOTE — PROGRESS NOTES
Denied - due to billing   Please bill correctly under Part B, not Part D. - faxed to [facility] in [place].

## 2021-03-23 ENCOUNTER — TRANSCRIBE ORDERS (OUTPATIENT)
Dept: ADMINISTRATIVE | Facility: HOSPITAL | Age: 66
End: 2021-03-23

## 2021-03-23 DIAGNOSIS — E11.42 TYPE 2 DIABETES MELLITUS WITH DIABETIC POLYNEUROPATHY, UNSPECIFIED WHETHER LONG TERM INSULIN USE (HCC): ICD-10-CM

## 2021-03-23 DIAGNOSIS — N40.0 BPH WITHOUT OBSTRUCTION/LOWER URINARY TRACT SYMPTOMS: ICD-10-CM

## 2021-03-23 DIAGNOSIS — D50.0 IRON DEFICIENCY ANEMIA SECONDARY TO BLOOD LOSS (CHRONIC): Primary | ICD-10-CM

## 2021-04-13 ENCOUNTER — APPOINTMENT (OUTPATIENT)
Dept: GENERAL RADIOLOGY | Facility: HOSPITAL | Age: 66
End: 2021-04-13

## 2021-04-13 ENCOUNTER — HOSPITAL ENCOUNTER (EMERGENCY)
Facility: HOSPITAL | Age: 66
Discharge: HOME | End: 2021-04-13
Admitting: EMERGENCY MEDICINE

## 2021-04-13 VITALS
BODY MASS INDEX: 42.66 KG/M2 | RESPIRATION RATE: 16 BRPM | DIASTOLIC BLOOD PRESSURE: 62 MMHG | SYSTOLIC BLOOD PRESSURE: 136 MMHG | HEART RATE: 79 BPM | TEMPERATURE: 98.2 F | HEIGHT: 72 IN | OXYGEN SATURATION: 98 % | WEIGHT: 315 LBS

## 2021-04-13 DIAGNOSIS — N18.9 CHRONIC KIDNEY DISEASE, UNSPECIFIED CKD STAGE: ICD-10-CM

## 2021-04-13 DIAGNOSIS — J06.9 UPPER RESPIRATORY TRACT INFECTION, UNSPECIFIED TYPE: Primary | ICD-10-CM

## 2021-04-13 LAB
ALBUMIN SERPL-MCNC: 3.9 G/DL (ref 3.5–5.2)
ALBUMIN/GLOB SERPL: 1.4 G/DL
ALP SERPL-CCNC: 68 U/L (ref 39–117)
ALT SERPL W P-5'-P-CCNC: 13 U/L (ref 1–41)
ANION GAP SERPL CALCULATED.3IONS-SCNC: 13 MMOL/L (ref 5–15)
APTT PPP: 27.1 SECONDS (ref 24.1–35)
AST SERPL-CCNC: 15 U/L (ref 1–40)
BASOPHILS # BLD AUTO: 0.03 10*3/MM3 (ref 0–0.2)
BASOPHILS NFR BLD AUTO: 0.4 % (ref 0–1.5)
BILIRUB SERPL-MCNC: 0.3 MG/DL (ref 0–1.2)
BUN SERPL-MCNC: 41 MG/DL (ref 8–23)
BUN/CREAT SERPL: 21.2 (ref 7–25)
CALCIUM SPEC-SCNC: 9.4 MG/DL (ref 8.6–10.5)
CHLORIDE SERPL-SCNC: 100 MMOL/L (ref 98–107)
CO2 SERPL-SCNC: 24 MMOL/L (ref 22–29)
CREAT SERPL-MCNC: 1.93 MG/DL (ref 0.76–1.27)
D DIMER PPP FEU-MCNC: 0.37 MG/L (FEU) (ref 0–0.5)
D-LACTATE SERPL-SCNC: 1.6 MMOL/L (ref 0.5–2)
DEPRECATED RDW RBC AUTO: 42.7 FL (ref 37–54)
EOSINOPHIL # BLD AUTO: 0.3 10*3/MM3 (ref 0–0.4)
EOSINOPHIL NFR BLD AUTO: 3.6 % (ref 0.3–6.2)
ERYTHROCYTE [DISTWIDTH] IN BLOOD BY AUTOMATED COUNT: 13.8 % (ref 12.3–15.4)
GFR SERPL CREATININE-BSD FRML MDRD: 35 ML/MIN/1.73
GLOBULIN UR ELPH-MCNC: 2.7 GM/DL
GLUCOSE SERPL-MCNC: 289 MG/DL (ref 65–99)
HCT VFR BLD AUTO: 33.4 % (ref 37.5–51)
HGB BLD-MCNC: 11.2 G/DL (ref 13–17.7)
HOLD SPECIMEN: NORMAL
IMM GRANULOCYTES # BLD AUTO: 0.03 10*3/MM3 (ref 0–0.05)
IMM GRANULOCYTES NFR BLD AUTO: 0.4 % (ref 0–0.5)
INR PPP: 1.11 (ref 0.91–1.09)
LYMPHOCYTES # BLD AUTO: 1.79 10*3/MM3 (ref 0.7–3.1)
LYMPHOCYTES NFR BLD AUTO: 21.2 % (ref 19.6–45.3)
MCH RBC QN AUTO: 28.9 PG (ref 26.6–33)
MCHC RBC AUTO-ENTMCNC: 33.5 G/DL (ref 31.5–35.7)
MCV RBC AUTO: 86.1 FL (ref 79–97)
MONOCYTES # BLD AUTO: 0.8 10*3/MM3 (ref 0.1–0.9)
MONOCYTES NFR BLD AUTO: 9.5 % (ref 5–12)
NEUTROPHILS NFR BLD AUTO: 5.48 10*3/MM3 (ref 1.7–7)
NEUTROPHILS NFR BLD AUTO: 64.9 % (ref 42.7–76)
NRBC BLD AUTO-RTO: 0 /100 WBC (ref 0–0.2)
NT-PROBNP SERPL-MCNC: 254.2 PG/ML (ref 0–900)
PLATELET # BLD AUTO: 211 10*3/MM3 (ref 140–450)
PMV BLD AUTO: 10.8 FL (ref 6–12)
POTASSIUM SERPL-SCNC: 4.3 MMOL/L (ref 3.5–5.2)
PROT SERPL-MCNC: 6.6 G/DL (ref 6–8.5)
PROTHROMBIN TIME: 13.9 SECONDS (ref 11.9–14.6)
RBC # BLD AUTO: 3.88 10*6/MM3 (ref 4.14–5.8)
SARS-COV-2 RDRP RESP QL NAA+PROBE: NORMAL
SODIUM SERPL-SCNC: 137 MMOL/L (ref 136–145)
TROPONIN T SERPL-MCNC: <0.01 NG/ML (ref 0–0.03)
WBC # BLD AUTO: 8.43 10*3/MM3 (ref 3.4–10.8)
WHOLE BLOOD HOLD SPECIMEN: NORMAL
WHOLE BLOOD HOLD SPECIMEN: NORMAL

## 2021-04-13 PROCEDURE — 83880 ASSAY OF NATRIURETIC PEPTIDE: CPT | Performed by: NURSE PRACTITIONER

## 2021-04-13 PROCEDURE — 80053 COMPREHEN METABOLIC PANEL: CPT | Performed by: NURSE PRACTITIONER

## 2021-04-13 PROCEDURE — 83605 ASSAY OF LACTIC ACID: CPT | Performed by: NURSE PRACTITIONER

## 2021-04-13 PROCEDURE — 85610 PROTHROMBIN TIME: CPT | Performed by: NURSE PRACTITIONER

## 2021-04-13 PROCEDURE — 85379 FIBRIN DEGRADATION QUANT: CPT | Performed by: NURSE PRACTITIONER

## 2021-04-13 PROCEDURE — 85730 THROMBOPLASTIN TIME PARTIAL: CPT | Performed by: NURSE PRACTITIONER

## 2021-04-13 PROCEDURE — 87040 BLOOD CULTURE FOR BACTERIA: CPT | Performed by: NURSE PRACTITIONER

## 2021-04-13 PROCEDURE — 85025 COMPLETE CBC W/AUTO DIFF WBC: CPT | Performed by: NURSE PRACTITIONER

## 2021-04-13 PROCEDURE — 71045 X-RAY EXAM CHEST 1 VIEW: CPT

## 2021-04-13 PROCEDURE — 87635 SARS-COV-2 COVID-19 AMP PRB: CPT | Performed by: NURSE PRACTITIONER

## 2021-04-13 PROCEDURE — 99283 EMERGENCY DEPT VISIT LOW MDM: CPT

## 2021-04-13 PROCEDURE — 84484 ASSAY OF TROPONIN QUANT: CPT | Performed by: NURSE PRACTITIONER

## 2021-04-13 RX ORDER — AMOXICILLIN 500 MG/1
1000 CAPSULE ORAL 3 TIMES DAILY
Qty: 30 CAPSULE | Refills: 0 | Status: SHIPPED | OUTPATIENT
Start: 2021-04-13 | End: 2021-04-19

## 2021-04-13 RX ORDER — ALBUTEROL SULFATE 90 UG/1
2 AEROSOL, METERED RESPIRATORY (INHALATION) EVERY 4 HOURS PRN
Qty: 8.5 G | Refills: 0 | Status: ON HOLD | OUTPATIENT
Start: 2021-04-13 | End: 2021-05-10

## 2021-04-13 RX ORDER — ALBUTEROL SULFATE 0.63 MG/3ML
1 SOLUTION RESPIRATORY (INHALATION) EVERY 6 HOURS PRN
Qty: 90 ML | Refills: 0 | Status: ON HOLD | OUTPATIENT
Start: 2021-04-13 | End: 2021-05-10

## 2021-04-13 RX ORDER — HYDRALAZINE HYDROCHLORIDE 20 MG/ML
10 INJECTION INTRAMUSCULAR; INTRAVENOUS ONCE
Status: DISCONTINUED | OUTPATIENT
Start: 2021-04-13 | End: 2021-04-13

## 2021-04-14 ENCOUNTER — EPISODE CHANGES (OUTPATIENT)
Dept: CASE MANAGEMENT | Facility: OTHER | Age: 66
End: 2021-04-14

## 2021-04-14 NOTE — ED PROVIDER NOTES
Subjective   65 yom presents with c/o SOB and cough.  He states he has been having symptoms for the last 3 days.  He denies fever.  He states the cough is productive with green sputum. He states he has chest pain but is chronic in nature and is not worse than his baseline.  He states he does not have COPD.  He does not wear oxygen or smoke cigarettes. He has had some nausea.  He states he is a diabetic.  His BS averages 100-150.  His wife states she has recently been sick with pneumonia and bronchitis.  She is worried she 'gave him what she had.'            Review of Systems   Constitutional: Negative for activity change, appetite change, fatigue and fever.   HENT: Negative for congestion, ear pain, facial swelling and sore throat.    Eyes: Negative for discharge and visual disturbance.   Respiratory: Positive for cough and shortness of breath. Negative for apnea, chest tightness, wheezing and stridor.    Cardiovascular: Negative for chest pain and palpitations.   Gastrointestinal: Negative for abdominal distention, abdominal pain, diarrhea, nausea and vomiting.   Genitourinary: Negative for difficulty urinating and dysuria.   Musculoskeletal: Negative for arthralgias and myalgias.   Skin: Negative for rash and wound.   Neurological: Negative for dizziness and seizures.   Psychiatric/Behavioral: Negative for agitation and confusion.       Past Medical History:   Diagnosis Date   • Arthritis    • CAD (coronary artery disease) 2/6/2017   • Coronary artery disease    • Diabetes mellitus (CMS/HCC)    • Gastroesophageal reflux disease 5/13/2019   • HTN (hypertension), benign 5/3/2017   • Hyperlipidemia    • Hypertension    • Mixed hyperlipidemia 2/7/2017   • Multiple lung nodules 1/26/2020    5mm, 9 mm RLL identified 1/2020, not present 10/2019.   • Myocardial infarction (CMS/HCC)    • Osteomyelitis (CMS/HCC) 1/22/2020   • Osteomyelitis of fifth toe of right foot (CMS/HCC) 10/7/2019   • Pancreatitis    • Persistent  "insomnia 1/20/2020   • Renal disorder    • Sleep apnea 2/6/2017   • Sleep apnea with use of continuous positive airway pressure (CPAP)    • Slow transit constipation 1/16/2019       Allergies   Allergen Reactions   • Bactrim [Sulfamethoxazole-Trimethoprim] Other (See Comments)     \"RENAL FAILURE\"   • Clindamycin Itching   • Vancomycin Itching   • Metronidazole Rash       Past Surgical History:   Procedure Laterality Date   • ABDOMINAL SURGERY     • APPENDECTOMY     • BACK SURGERY     • CARDIAC CATHETERIZATION Left 2/8/2021    Procedure: Left Heart Cath w poss intervention left anatomical snuff box acess;  Surgeon: Omkar Charles DO;  Location: [facility] CATH INVASIVE LOCATION;  Service: Cardiology;  Laterality: Left;   • CARDIAC SURGERY     • CATARACT EXTRACTION     • CERVICAL SPINE SURGERY     • COLONOSCOPY N/A 1/31/2017    Normal exam repeat in 5 years   • COLONOSCOPY N/A 2/11/2019    Mild acute inflammation   • COLONOSCOPY W/ POLYPECTOMY  03/04/2014    Hyperplastic polyp   • CORONARY ARTERY BYPASS GRAFT  10/2015   • ENDOSCOPY  04/13/2011    Gastritis with hemorrhage   • ENDOSCOPY N/A 5/5/2017    Normal exam   • ENDOSCOPY N/A 2/11/2019    Gastritis   • ENDOSCOPY N/A 9/1/2020    Non-erosive gastritis with hemorrhage   • ENDOSCOPY N/A 2/10/2021    Procedure: ESOPHAGOGASTRODUODENOSCOPY WITH ANESTHESIA;  Surgeon: Cristopher Hannah MD;  Location: [facility] ENDOSCOPY;  Service: Gastroenterology;  Laterality: N/A;  preop; chest pain  postop; esophagitis;   PCP Del Shetty    • INCISION AND DRAINAGE OF WOUND Left 09/2007    spider bite       Family History   Problem Relation Age of Onset   • Colon cancer Father    • Heart disease Father    • Colon cancer Sister    • Colon polyps Sister    • Alzheimer's disease Mother    • Coronary artery disease Sister    • Coronary artery disease Sister        Social History     Socioeconomic History   • Marital status:      Spouse name: Not on file   • Number of children: " Not on file   • Years of education: Not on file   • Highest education level: Not on file   Tobacco Use   • Smoking status: Never Smoker   • Smokeless tobacco: Never Used   • Tobacco comment: smoked in highschool   Substance and Sexual Activity   • Alcohol use: No   • Drug use: No   • Sexual activity: Defer           Objective   Physical Exam  Constitutional:       Appearance: He is well-developed.   HENT:      Head: Normocephalic.   Eyes:      Pupils: Pupils are equal, round, and reactive to light.   Cardiovascular:      Rate and Rhythm: Normal rate and regular rhythm.      Heart sounds: No murmur heard.     Pulmonary:      Effort: Pulmonary effort is normal.      Breath sounds: Normal breath sounds. No decreased breath sounds, wheezing, rhonchi or rales.   Abdominal:      General: Bowel sounds are normal.      Palpations: Abdomen is soft.   Musculoskeletal:         General: Normal range of motion.      Cervical back: Normal range of motion and neck supple.   Skin:     General: Skin is warm and dry.   Neurological:      Mental Status: He is alert and oriented to person, place, and time.         Procedures           ED Course  ED Course as of Apr 14 0041   Tue Apr 13, 2021 1954 Transfer of care to MARTINA Jean Baptiste PA-C at 2000    [KS]   1958 Assumed care of patient at this time from Ms. Susi Yepez NP. Pending results of labs, COVID, CXR will reevaluate and disposition accordingly. Please see Ms. Yepez' note above for HPI, ROS, and physical examination findings.     [JS]   2121 Upon reevaluation patient reports he is feeling much better.  Patient continues to be oxygenating well on room air.  Patient educated on lab and imaging findings and need for continued outpatient follow-up.  Patient with no further questions, concerns, or needs and is stable for discharge.    [JS]      ED Course User Index  [JS] Dylan Jean Baptiste PA-C  [KS] Susi Yepez, APRN                                            MDM    Final diagnoses:   Upper respiratory tract infection, unspecified type   Chronic kidney disease, unspecified CKD stage       ED Disposition  ED Disposition     ED Disposition Condition Comment    Discharge Stable           Del Shetty MD  [address] [place] [address] [address]  [facility] [address]  [phone]    Schedule an appointment as soon as possible for a visit in 2 days      [facility] Emergency Department  [address] [facility] [address] [phone]    As needed         Medication List      New Prescriptions    * albuterol sulfate  (90 Base) MCG/ACT inhaler  Commonly known as: PROVENTIL HFA;VENTOLIN HFA;PROAIR HFA  Inhale 2 puffs Every 4 (Four) Hours As Needed for Wheezing.     * albuterol 0.63 MG/3ML nebulizer solution  Commonly known as: ACCUNEB  Take 3 mL by nebulization Every 6 (Six) Hours As Needed for Wheezing.     amoxicillin 500 MG capsule  Commonly known as: AMOXIL  Take 2 capsules by mouth 3 (Three) Times a Day for 5 days.         * This list has 2 medication(s) that are the same as other medications prescribed for you. Read the directions carefully, and ask your doctor or other care provider to review them with you.               Where to Get Your Medications      These medications were sent to [facility]/pharmacy #[id number] - [place], [place] - [address] - [phone]  - [phone] FX  [address] [facility] [address]    Phone: [phone]   · albuterol 0.63 MG/3ML nebulizer solution  · albuterol sulfate  (90 Base) MCG/ACT inhaler  · amoxicillin 500 MG capsule          Dylan Jean Baptiste PA-C  04/14/21 0041

## 2021-04-14 NOTE — DISCHARGE INSTRUCTIONS
Dang Vincent is a 60 year old female seen in consultation at the request of Natalio Tubbs MD with a chief complaint of lump behind the right ear. Patient first noticed this about 2 years ago. It has gotten slowly larger over time. It is not painful. It has never been acutely red, hot, tender. She says it does itch. She is bothered by the fact she can feel it and that it has gotten bigger. It does not hurt to turn her head to either side. No ear pain. No unilateral headaches on that side. No history of skin cancer on her scalp or face.      The medications, medical and surgical history below, and social and family history have been reviewed.  Additional vitals and physical exam findings also recorded below.     ALLERGIES:  Dust; Pollen; Ragweed; and Sulfa antibiotics     Current Outpatient Medications   Medication   • HYDROcodone-acetaminophen (NORCO) 5-325 MG per tablet   • budesonide-formoterol (SYMBICORT) 160-4.5 MCG/ACT inhaler   • triamterene-hydrochlorothiazide (MAXZIDE) 75-50 MG per tablet   • lidocaine (LIDOCARE) 4 % patch   • methocarbamol (ROBAXIN) 500 MG tablet   • ibuprofen (MOTRIN) 600 MG tablet     No current facility-administered medications for this visit.      Facility-Administered Medications Ordered in Other Visits   Medication   • iohexol (OMNIPAQUE 350) contrast solution 25 mL       Past Medical History:   Diagnosis Date   • DDD (degenerative disc disease), lumbar    • Elevated serum creatinine     Cr 2.18   • Elevated triglycerides with high cholesterol    • Endometriosis    • Excessive drinking alcohol    • Hepatitis B infection    • Liver lesion 7-2016    2 echogenic lesions 2-3 mm/check follow-up US 4 months   • Microalbuminuria    • Migraine    • Osteopenia 12/21/2000   • Tobacco use disorder    • Unspecified asthma(493.90)    • Unspecified essential hypertension        Past Surgical History:   Procedure Laterality Date   • Colonoscopy  2011   • Discission,2nd cataract,laser Right  Upper Respiratory Infection, Adult  An upper respiratory infection (URI) is a common viral infection of the nose, throat, and upper air passages that lead to the lungs. The most common type of URI is the common cold. URIs usually get better on their own, without medical treatment.  What are the causes?  A URI is caused by a virus. You may catch a virus by:  · Breathing in droplets from an infected person's cough or sneeze.  · Touching something that has been exposed to the virus (contaminated) and then touching your mouth, nose, or eyes.  What increases the risk?  You are more likely to get a URI if:  · You are very young or very old.  · It is lita or winter.  · You have close contact with others, such as at a , school, or health care facility.  · You smoke.  · You have long-term (chronic) heart or lung disease.  · You have a weakened disease-fighting (immune) system.  · You have nasal allergies or asthma.  · You are experiencing a lot of stress.  · You work in an area that has poor air circulation.  · You have poor nutrition.  What are the signs or symptoms?  A URI usually involves some of the following symptoms:  · Runny or stuffy (congested) nose.  · Sneezing.  · Cough.  · Sore throat.  · Headache.  · Fatigue.  · Fever.  · Loss of appetite.  · Pain in your forehead, behind your eyes, and over your cheekbones (sinus pain).  · Muscle aches.  · Redness or irritation of the eyes.  · Pressure in the ears or face.  How is this diagnosed?  This condition may be diagnosed based on your medical history and symptoms, and a physical exam. Your health care provider may use a cotton swab to take a mucus sample from your nose (nasal swab). This sample can be tested to determine what virus is causing the illness.  How is this treated?  URIs usually get better on their own within 7-10 days. You can take steps at home to relieve your symptoms. Medicines cannot cure URIs, but your health care provider may recommend  12/15/2017    Yag Laser/Second Cataract   • Hemocult x1  08/17/2010   • Service to gastroenterology          Social History     Tobacco Use   • Smoking status: Current Every Day Smoker     Packs/day: 1.50     Types: Cigarettes   • Smokeless tobacco: Never Used   • Tobacco comment: quit info given 12/9/14   Substance Use Topics   • Alcohol use: Yes     Comment: couple drinks today    • Drug use: No        Family History   Problem Relation Age of Onset   • Cancer Mother         laryngeal   • * Father         kidney failure   • Hypertension Sister         diabetes   • Cancer Brother         prostate   • Cancer Sister         lung   • Cancer, Breast Neg Hx    • Cancer, Ovarian Neg Hx        REVIEW OF SYSTEMS:  Reviewed Review of Systems (ROS) in MA's note.  Other than as listed or described in the history of present illness above, the remainder of a 12-point systems review is negative.      EXAM:  Visit Vitals  Pulse 64   Temp 98.6 °F (37 °C) (Oral)   Ht 5' 3\" (1.6 m)   Wt 55.6 kg   BMI 21.70 kg/m²       GENERAL:  Well nourished/well developed (WN/WD) 60 year old female in no acute distress (NAD) with fluent speech, strong voice, no stridor, and unlabored respirations. Affect is calm, and patient is alert.      FACE/HEAD:  Posterior to the right ear there is about a 2.5 cm fluctuant, mobile lump without any pigment changes.  Face is strong and symmetric.  Light reflexes are symmetric.  Pupils equal, round, reactive to light.  Irides normal.  Other cranial nerves III-XII grossly intact.      EARS:  Right Auricle:  Normally-formed and without lesions.  External Canal:  External auditory canal (EAC) patent, no edema, erythema, or exudate.  Tympanic Membrane:  Tympanic membrane (TM) is translucent, mobile, not retracted, with normal landmarks.      Left Auricle:  Normally-formed and without lesions.  External Canal:  External auditory canal (EAC) patent, no edema, erythema, or exudate.  Tympanic Membrane:  Tympanic membrane  certain medicines to help relieve symptoms, such as:  · Over-the-counter cold medicines.  · Cough suppressants. Coughing is a type of defense against infection that helps to clear the respiratory system, so take these medicines only as recommended by your health care provider.  · Fever-reducing medicines.  Follow these instructions at home:  Activity  · Rest as needed.  · If you have a fever, stay home from work or school until your fever is gone or until your health care provider says you are no longer contagious. Your health care provider may have you wear a face mask to prevent your infection from spreading.  Relieving symptoms  · Gargle with a salt-water mixture 3-4 times a day or as needed. To make a salt-water mixture, completely dissolve ½-1 tsp of salt in 1 cup of warm water.  · Use a cool-mist humidifier to add moisture to the air. This can help you breathe more easily.  Eating and drinking    · Drink enough fluid to keep your urine pale yellow.  · Eat soups and other clear broths.  General instructions    · Take over-the-counter and prescription medicines only as told by your health care provider. These include cold medicines, fever reducers, and cough suppressants.  · Do not use any products that contain nicotine or tobacco, such as cigarettes and e-cigarettes. If you need help quitting, ask your health care provider.  · Stay away from secondhand smoke.  · Stay up to date on all immunizations, including the yearly (annual) flu vaccine.  · Keep all follow-up visits as told by your health care provider. This is important.  How to prevent the spread of infection to others    · URIs can be passed from person to person (are contagious). To prevent the infection from spreading:  ? Wash your hands often with soap and water. If soap and water are not available, use hand .  ? Avoid touching your mouth, face, eyes, or nose.  ? Cough or sneeze into a tissue or your sleeve or elbow instead of into your hand  (TM) is translucent, mobile, not retracted, with normal landmarks.     NOSE:  External nose is normally-formed and without lesions.  Nasal mucosa:  Nasal mucosa pink and moist, no pus or polyps.   Septum:  Septum is deviated to the left.   Turbinates:  Turbinates are normal bilaterally.     ORAL CAVITY:  Lips:  Lips are mobile, symmetric, without lesions.    Teeth:  Dentition is grossly normal and in good repair.  Gingivae:  Gums are pink, no lesions.  Oral Cavity:  Oral cavity mucosa is moist and pink, no lesions noted.  Hard palate is normal.  Anterior tongue is mobile and soft.  Floor of mouth and salivary glands are normal to palpation.    PHARYNX:  Oropharynx:  Oropharyngeal mucosa is moist and pink, no lesions noted.  Soft palate is normal.  Tongue base is soft without masses.  Tonsillar fossae and posterior pharyngeal walls are normal.  Tonsils:  Tonsils are normal-appearing, symmetric, and without exudate.    NECK:  Neck is supple.  No masses or lymphadenopathy are noted.  There is no crepitus.  Trachea is midline.  Thyroid gland is non-tender, non-nodular, and non-enlarged.  Major salivary glands are normal to palpation.      IMPRESSION:  1. Post auricular mass on the right side about 2.5 cm in diameter, soft, fluctuant. Consistent with lipoma or epidermal cyst. Discussed removal likely could be done in the office using local lidocaine or in the surgical center. Referred to ENT to discuss removal.    PLAN:  Patient Instructions   1. The bump behind your right ear feels most consistent with a lipoma which is a benign fatty tumor. Otherwise it could be a cyst within the skin itself. The best and only treatment would be removal however since this is likely benign this is your decision. If you would like to have it removed, make an appointment with an ENT surgeon listed below.    Please see below for ENT surgeons in the area.    Dr. Solis  [facility]- Fridays only  [address] [place]  or into the air.  Contact a health care provider if:  · You are getting worse instead of better.  · You have a fever or chills.  · Your mucus is brown or red.  · You have yellow or brown discharge coming from your nose.  · You have pain in your face, especially when you bend forward.  · You have swollen neck glands.  · You have pain while swallowing.  · You have white areas in the back of your throat.  Get help right away if:  · You have shortness of breath that gets worse.  · You have severe or persistent:  ? Headache.  ? Ear pain.  ? Sinus pain.  ? Chest pain.  · You have chronic lung disease along with any of the following:  ? Wheezing.  ? Prolonged cough.  ? Coughing up blood.  ? A change in your usual mucus.  · You have a stiff neck.  · You have changes in your:  ? Vision.  ? Hearing.  ? Thinking.  ? Mood.  Summary  · An upper respiratory infection (URI) is a common infection of the nose, throat, and upper air passages that lead to the lungs.  · A URI is caused by a virus.  · URIs usually get better on their own within 7-10 days.  · Medicines cannot cure URIs, but your health care provider may recommend certain medicines to help relieve symptoms.  This information is not intended to replace advice given to you by your health care provider. Make sure you discuss any questions you have with your health care provider.  Document Revised: 12/26/2019 Document Reviewed: 08/03/2018  [employer] Patient Education © 2021 [employer]     [place]  [phone]    Dr. Radha Santana [facility]  [address] [place], [place]  [phone]    [facility]  [address], [place]  [phone]    [place] [address] [phone]    [address] [place] [id number] [phone]

## 2021-04-18 LAB
BACTERIA SPEC AEROBE CULT: NORMAL
BACTERIA SPEC AEROBE CULT: NORMAL

## 2021-05-03 RX ORDER — CARVEDILOL 3.12 MG/1
3.12 TABLET ORAL 2 TIMES DAILY WITH MEALS
Qty: 60 TABLET | Refills: 0 | Status: ON HOLD | OUTPATIENT
Start: 2021-05-03 | End: 2021-12-09

## 2021-05-08 ENCOUNTER — HOSPITAL ENCOUNTER (OUTPATIENT)
Facility: HOSPITAL | Age: 66
Setting detail: OBSERVATION
Discharge: HOME | End: 2021-05-11
Attending: EMERGENCY MEDICINE | Admitting: FAMILY MEDICINE

## 2021-05-08 ENCOUNTER — APPOINTMENT (OUTPATIENT)
Dept: CT IMAGING | Facility: HOSPITAL | Age: 66
End: 2021-05-08

## 2021-05-08 DIAGNOSIS — K59.00 CONSTIPATION, UNSPECIFIED CONSTIPATION TYPE: ICD-10-CM

## 2021-05-08 DIAGNOSIS — N17.9 AKI (ACUTE KIDNEY INJURY) (HCC): ICD-10-CM

## 2021-05-08 DIAGNOSIS — K92.2 GASTROINTESTINAL HEMORRHAGE, UNSPECIFIED GASTROINTESTINAL HEMORRHAGE TYPE: Primary | ICD-10-CM

## 2021-05-08 LAB
ABO GROUP BLD: NORMAL
ALBUMIN SERPL-MCNC: 4.3 G/DL (ref 3.5–5.2)
ALBUMIN/GLOB SERPL: 1.4 G/DL
ALP SERPL-CCNC: 88 U/L (ref 39–117)
ALT SERPL W P-5'-P-CCNC: 15 U/L (ref 1–41)
AMMONIA BLD-SCNC: 17 UMOL/L (ref 16–60)
ANION GAP SERPL CALCULATED.3IONS-SCNC: 13 MMOL/L (ref 5–15)
APTT PPP: 29.5 SECONDS (ref 24.1–35)
AST SERPL-CCNC: 27 U/L (ref 1–40)
BASOPHILS # BLD AUTO: 0.06 10*3/MM3 (ref 0–0.2)
BASOPHILS NFR BLD AUTO: 0.6 % (ref 0–1.5)
BILIRUB SERPL-MCNC: 0.4 MG/DL (ref 0–1.2)
BILIRUB UR QL STRIP: NEGATIVE
BLD GP AB SCN SERPL QL: NEGATIVE
BUN SERPL-MCNC: 71 MG/DL (ref 8–23)
BUN/CREAT SERPL: 23.4 (ref 7–25)
CALCIUM SPEC-SCNC: 9.7 MG/DL (ref 8.6–10.5)
CHLORIDE SERPL-SCNC: 95 MMOL/L (ref 98–107)
CLARITY UR: CLEAR
CO2 SERPL-SCNC: 26 MMOL/L (ref 22–29)
COLOR UR: YELLOW
CREAT SERPL-MCNC: 3.03 MG/DL (ref 0.76–1.27)
DEPRECATED RDW RBC AUTO: 42.4 FL (ref 37–54)
DEVELOPER EXPIRATION DATE: ABNORMAL
DEVELOPER LOT NUMBER: 195
EOSINOPHIL # BLD AUTO: 0.28 10*3/MM3 (ref 0–0.4)
EOSINOPHIL NFR BLD AUTO: 2.8 % (ref 0.3–6.2)
ERYTHROCYTE [DISTWIDTH] IN BLOOD BY AUTOMATED COUNT: 13.5 % (ref 12.3–15.4)
EXPIRATION DATE: ABNORMAL
FECAL OCCULT BLOOD SCREEN, POC: NEGATIVE
GFR SERPL CREATININE-BSD FRML MDRD: 21 ML/MIN/1.73
GLOBULIN UR ELPH-MCNC: 3 GM/DL
GLUCOSE SERPL-MCNC: 248 MG/DL (ref 65–99)
GLUCOSE UR STRIP-MCNC: NEGATIVE MG/DL
HCT VFR BLD AUTO: 35 % (ref 37.5–51)
HGB BLD-MCNC: 11.9 G/DL (ref 13–17.7)
HGB UR QL STRIP.AUTO: NEGATIVE
IMM GRANULOCYTES # BLD AUTO: 0.05 10*3/MM3 (ref 0–0.05)
IMM GRANULOCYTES NFR BLD AUTO: 0.5 % (ref 0–0.5)
INR PPP: 1.06 (ref 0.91–1.09)
KETONES UR QL STRIP: NEGATIVE
LEUKOCYTE ESTERASE UR QL STRIP.AUTO: NEGATIVE
LYMPHOCYTES # BLD AUTO: 1.83 10*3/MM3 (ref 0.7–3.1)
LYMPHOCYTES NFR BLD AUTO: 18.3 % (ref 19.6–45.3)
Lab: 195
MCH RBC QN AUTO: 29.3 PG (ref 26.6–33)
MCHC RBC AUTO-ENTMCNC: 34 G/DL (ref 31.5–35.7)
MCV RBC AUTO: 86.2 FL (ref 79–97)
MONOCYTES # BLD AUTO: 1 10*3/MM3 (ref 0.1–0.9)
MONOCYTES NFR BLD AUTO: 10 % (ref 5–12)
NEGATIVE CONTROL: NEGATIVE
NEUTROPHILS NFR BLD AUTO: 6.76 10*3/MM3 (ref 1.7–7)
NEUTROPHILS NFR BLD AUTO: 67.8 % (ref 42.7–76)
NITRITE UR QL STRIP: NEGATIVE
NRBC BLD AUTO-RTO: 0 /100 WBC (ref 0–0.2)
PH UR STRIP.AUTO: <=5 [PH] (ref 5–8)
PLATELET # BLD AUTO: 288 10*3/MM3 (ref 140–450)
PMV BLD AUTO: 10.7 FL (ref 6–12)
POSITIVE CONTROL: POSITIVE
POTASSIUM SERPL-SCNC: 4.6 MMOL/L (ref 3.5–5.2)
PROT SERPL-MCNC: 7.3 G/DL (ref 6–8.5)
PROT UR QL STRIP: ABNORMAL
PROTHROMBIN TIME: 13 SECONDS (ref 11.5–13.4)
RBC # BLD AUTO: 4.06 10*6/MM3 (ref 4.14–5.8)
RH BLD: NEGATIVE
SARS-COV-2 RNA PNL SPEC NAA+PROBE: NOT DETECTED
SODIUM SERPL-SCNC: 134 MMOL/L (ref 136–145)
SP GR UR STRIP: 1.02 (ref 1–1.03)
T&S EXPIRATION DATE: NORMAL
UROBILINOGEN UR QL STRIP: ABNORMAL
WBC # BLD AUTO: 9.98 10*3/MM3 (ref 3.4–10.8)

## 2021-05-08 PROCEDURE — 86900 BLOOD TYPING SEROLOGIC ABO: CPT | Performed by: EMERGENCY MEDICINE

## 2021-05-08 PROCEDURE — 85730 THROMBOPLASTIN TIME PARTIAL: CPT | Performed by: EMERGENCY MEDICINE

## 2021-05-08 PROCEDURE — 25010000002 METHYLNALTREXONE 12 MG/0.6ML SOLUTION: Performed by: EMERGENCY MEDICINE

## 2021-05-08 PROCEDURE — P9612 CATHETERIZE FOR URINE SPEC: HCPCS

## 2021-05-08 PROCEDURE — 85025 COMPLETE CBC W/AUTO DIFF WBC: CPT | Performed by: EMERGENCY MEDICINE

## 2021-05-08 PROCEDURE — G0378 HOSPITAL OBSERVATION PER HR: HCPCS

## 2021-05-08 PROCEDURE — 51798 US URINE CAPACITY MEASURE: CPT

## 2021-05-08 PROCEDURE — 82140 ASSAY OF AMMONIA: CPT | Performed by: EMERGENCY MEDICINE

## 2021-05-08 PROCEDURE — 99284 EMERGENCY DEPT VISIT MOD MDM: CPT

## 2021-05-08 PROCEDURE — 96372 THER/PROPH/DIAG INJ SC/IM: CPT

## 2021-05-08 PROCEDURE — 25010000002 ONDANSETRON PER 1 MG: Performed by: EMERGENCY MEDICINE

## 2021-05-08 PROCEDURE — 81003 URINALYSIS AUTO W/O SCOPE: CPT | Performed by: EMERGENCY MEDICINE

## 2021-05-08 PROCEDURE — 86901 BLOOD TYPING SEROLOGIC RH(D): CPT | Performed by: EMERGENCY MEDICINE

## 2021-05-08 PROCEDURE — C9803 HOPD COVID-19 SPEC COLLECT: HCPCS

## 2021-05-08 PROCEDURE — 85610 PROTHROMBIN TIME: CPT | Performed by: EMERGENCY MEDICINE

## 2021-05-08 PROCEDURE — 80053 COMPREHEN METABOLIC PANEL: CPT | Performed by: EMERGENCY MEDICINE

## 2021-05-08 PROCEDURE — 74176 CT ABD & PELVIS W/O CONTRAST: CPT

## 2021-05-08 PROCEDURE — 82270 OCCULT BLOOD FECES: CPT | Performed by: EMERGENCY MEDICINE

## 2021-05-08 PROCEDURE — 96375 TX/PRO/DX INJ NEW DRUG ADDON: CPT

## 2021-05-08 PROCEDURE — 96374 THER/PROPH/DIAG INJ IV PUSH: CPT

## 2021-05-08 PROCEDURE — 87635 SARS-COV-2 COVID-19 AMP PRB: CPT | Performed by: EMERGENCY MEDICINE

## 2021-05-08 PROCEDURE — 86850 RBC ANTIBODY SCREEN: CPT | Performed by: EMERGENCY MEDICINE

## 2021-05-08 RX ORDER — CARVEDILOL 3.12 MG/1
3.12 TABLET ORAL 2 TIMES DAILY WITH MEALS
Status: DISCONTINUED | OUTPATIENT
Start: 2021-05-09 | End: 2021-05-11 | Stop reason: HOSPADM

## 2021-05-08 RX ORDER — GABAPENTIN 300 MG/1
300 CAPSULE ORAL 3 TIMES DAILY
Status: DISCONTINUED | OUTPATIENT
Start: 2021-05-09 | End: 2021-05-11 | Stop reason: HOSPADM

## 2021-05-08 RX ORDER — ROSUVASTATIN CALCIUM 20 MG/1
40 TABLET, COATED ORAL NIGHTLY
Status: DISCONTINUED | OUTPATIENT
Start: 2021-05-09 | End: 2021-05-11 | Stop reason: HOSPADM

## 2021-05-08 RX ORDER — PANTOPRAZOLE SODIUM 40 MG/10ML
80 INJECTION, POWDER, LYOPHILIZED, FOR SOLUTION INTRAVENOUS ONCE
Status: COMPLETED | OUTPATIENT
Start: 2021-05-08 | End: 2021-05-08

## 2021-05-08 RX ORDER — ONDANSETRON 2 MG/ML
4 INJECTION INTRAMUSCULAR; INTRAVENOUS ONCE
Status: COMPLETED | OUTPATIENT
Start: 2021-05-08 | End: 2021-05-08

## 2021-05-08 RX ORDER — ONDANSETRON 2 MG/ML
4 INJECTION INTRAMUSCULAR; INTRAVENOUS EVERY 6 HOURS PRN
Status: DISCONTINUED | OUTPATIENT
Start: 2021-05-08 | End: 2021-05-11 | Stop reason: HOSPADM

## 2021-05-08 RX ORDER — CYCLOBENZAPRINE HCL 5 MG
5 TABLET ORAL 2 TIMES DAILY PRN
Status: DISCONTINUED | OUTPATIENT
Start: 2021-05-08 | End: 2021-05-11 | Stop reason: HOSPADM

## 2021-05-08 RX ORDER — DULOXETIN HYDROCHLORIDE 30 MG/1
90 CAPSULE, DELAYED RELEASE ORAL DAILY
Status: DISCONTINUED | OUTPATIENT
Start: 2021-05-09 | End: 2021-05-11 | Stop reason: HOSPADM

## 2021-05-08 RX ORDER — BUDESONIDE 0.5 MG/2ML
0.5 INHALANT ORAL
Status: DISCONTINUED | OUTPATIENT
Start: 2021-05-09 | End: 2021-05-11 | Stop reason: HOSPADM

## 2021-05-08 RX ORDER — ACETAMINOPHEN 325 MG/1
650 TABLET ORAL EVERY 4 HOURS PRN
Status: DISCONTINUED | OUTPATIENT
Start: 2021-05-08 | End: 2021-05-11 | Stop reason: HOSPADM

## 2021-05-08 RX ORDER — LACTULOSE 20 G/30ML
20 SOLUTION ORAL 3 TIMES DAILY PRN
Status: DISCONTINUED | OUTPATIENT
Start: 2021-05-08 | End: 2021-05-11 | Stop reason: HOSPADM

## 2021-05-08 RX ORDER — TAMSULOSIN HYDROCHLORIDE 0.4 MG/1
0.4 CAPSULE ORAL DAILY
Status: DISCONTINUED | OUTPATIENT
Start: 2021-05-09 | End: 2021-05-11 | Stop reason: HOSPADM

## 2021-05-08 RX ORDER — OXYCODONE AND ACETAMINOPHEN 10; 325 MG/1; MG/1
1 TABLET ORAL 2 TIMES DAILY
Status: DISCONTINUED | OUTPATIENT
Start: 2021-05-09 | End: 2021-05-09

## 2021-05-08 RX ORDER — ALBUTEROL SULFATE 1.25 MG/3ML
0.63 SOLUTION RESPIRATORY (INHALATION) EVERY 6 HOURS PRN
Refills: 0 | Status: DISCONTINUED | OUTPATIENT
Start: 2021-05-08 | End: 2021-05-11 | Stop reason: HOSPADM

## 2021-05-08 RX ORDER — TRAZODONE HYDROCHLORIDE 100 MG/1
100 TABLET ORAL NIGHTLY
Status: DISCONTINUED | OUTPATIENT
Start: 2021-05-09 | End: 2021-05-11 | Stop reason: HOSPADM

## 2021-05-08 RX ORDER — SODIUM CHLORIDE 0.9 % (FLUSH) 0.9 %
10 SYRINGE (ML) INJECTION EVERY 12 HOURS SCHEDULED
Status: DISCONTINUED | OUTPATIENT
Start: 2021-05-09 | End: 2021-05-11 | Stop reason: HOSPADM

## 2021-05-08 RX ORDER — SUCRALFATE 1 G/1
1 TABLET ORAL
Status: DISCONTINUED | OUTPATIENT
Start: 2021-05-09 | End: 2021-05-11 | Stop reason: HOSPADM

## 2021-05-08 RX ORDER — SODIUM CHLORIDE 0.9 % (FLUSH) 0.9 %
10 SYRINGE (ML) INJECTION AS NEEDED
Status: DISCONTINUED | OUTPATIENT
Start: 2021-05-08 | End: 2021-05-11 | Stop reason: HOSPADM

## 2021-05-08 RX ORDER — COLCHICINE 0.6 MG/1
0.6 TABLET ORAL 2 TIMES DAILY
Status: DISCONTINUED | OUTPATIENT
Start: 2021-05-09 | End: 2021-05-11 | Stop reason: HOSPADM

## 2021-05-08 RX ORDER — PANTOPRAZOLE SODIUM 40 MG/10ML
40 INJECTION, POWDER, LYOPHILIZED, FOR SOLUTION INTRAVENOUS
Status: DISCONTINUED | OUTPATIENT
Start: 2021-05-09 | End: 2021-05-10

## 2021-05-08 RX ORDER — DEXTROSE MONOHYDRATE 25 G/50ML
25 INJECTION, SOLUTION INTRAVENOUS
Status: DISCONTINUED | OUTPATIENT
Start: 2021-05-08 | End: 2021-05-11 | Stop reason: HOSPADM

## 2021-05-08 RX ORDER — NICOTINE POLACRILEX 4 MG
15 LOZENGE BUCCAL
Status: DISCONTINUED | OUTPATIENT
Start: 2021-05-08 | End: 2021-05-11 | Stop reason: HOSPADM

## 2021-05-08 RX ORDER — SODIUM CHLORIDE 9 MG/ML
75 INJECTION, SOLUTION INTRAVENOUS CONTINUOUS
Status: DISCONTINUED | OUTPATIENT
Start: 2021-05-09 | End: 2021-05-11 | Stop reason: HOSPADM

## 2021-05-08 RX ADMIN — ONDANSETRON HYDROCHLORIDE 4 MG: 2 SOLUTION INTRAMUSCULAR; INTRAVENOUS at 19:27

## 2021-05-08 RX ADMIN — PANTOPRAZOLE SODIUM 80 MG: 40 INJECTION, POWDER, FOR SOLUTION INTRAVENOUS at 19:27

## 2021-05-08 RX ADMIN — METHYLNALTREXONE BROMIDE 12 MG: 12 INJECTION, SOLUTION SUBCUTANEOUS at 20:53

## 2021-05-08 RX ADMIN — SODIUM CHLORIDE, POTASSIUM CHLORIDE, SODIUM LACTATE AND CALCIUM CHLORIDE 1000 ML: 600; 310; 30; 20 INJECTION, SOLUTION INTRAVENOUS at 20:23

## 2021-05-09 ENCOUNTER — APPOINTMENT (OUTPATIENT)
Dept: GENERAL RADIOLOGY | Facility: HOSPITAL | Age: 66
End: 2021-05-09

## 2021-05-09 LAB
25(OH)D3 SERPL-MCNC: 26 NG/ML (ref 30–100)
ALBUMIN SERPL-MCNC: 3.8 G/DL (ref 3.5–5.2)
ALBUMIN/GLOB SERPL: 1.3 G/DL
ALP SERPL-CCNC: 76 U/L (ref 39–117)
ALT SERPL W P-5'-P-CCNC: 13 U/L (ref 1–41)
ANION GAP SERPL CALCULATED.3IONS-SCNC: 10 MMOL/L (ref 5–15)
AST SERPL-CCNC: 22 U/L (ref 1–40)
BASOPHILS # BLD AUTO: 0.06 10*3/MM3 (ref 0–0.2)
BASOPHILS NFR BLD AUTO: 0.7 % (ref 0–1.5)
BILIRUB SERPL-MCNC: 0.3 MG/DL (ref 0–1.2)
BUN SERPL-MCNC: 71 MG/DL (ref 8–23)
BUN/CREAT SERPL: 23.8 (ref 7–25)
CALCIUM SPEC-SCNC: 9.2 MG/DL (ref 8.6–10.5)
CHLORIDE SERPL-SCNC: 98 MMOL/L (ref 98–107)
CO2 SERPL-SCNC: 29 MMOL/L (ref 22–29)
CREAT SERPL-MCNC: 2.98 MG/DL (ref 0.76–1.27)
DEPRECATED RDW RBC AUTO: 43.9 FL (ref 37–54)
EOSINOPHIL # BLD AUTO: 0.38 10*3/MM3 (ref 0–0.4)
EOSINOPHIL NFR BLD AUTO: 4.2 % (ref 0.3–6.2)
ERYTHROCYTE [DISTWIDTH] IN BLOOD BY AUTOMATED COUNT: 13.7 % (ref 12.3–15.4)
GFR SERPL CREATININE-BSD FRML MDRD: 21 ML/MIN/1.73
GLOBULIN UR ELPH-MCNC: 3 GM/DL
GLUCOSE BLDC GLUCOMTR-MCNC: 219 MG/DL (ref 70–130)
GLUCOSE BLDC GLUCOMTR-MCNC: 222 MG/DL (ref 70–130)
GLUCOSE SERPL-MCNC: 56 MG/DL (ref 65–99)
HCT VFR BLD AUTO: 32.9 % (ref 37.5–51)
HGB BLD-MCNC: 11 G/DL (ref 13–17.7)
IMM GRANULOCYTES # BLD AUTO: 0.04 10*3/MM3 (ref 0–0.05)
IMM GRANULOCYTES NFR BLD AUTO: 0.4 % (ref 0–0.5)
LYMPHOCYTES # BLD AUTO: 1.96 10*3/MM3 (ref 0.7–3.1)
LYMPHOCYTES NFR BLD AUTO: 21.8 % (ref 19.6–45.3)
MAGNESIUM SERPL-MCNC: 2.5 MG/DL (ref 1.6–2.4)
MCH RBC QN AUTO: 29.4 PG (ref 26.6–33)
MCHC RBC AUTO-ENTMCNC: 33.4 G/DL (ref 31.5–35.7)
MCV RBC AUTO: 88 FL (ref 79–97)
MONOCYTES # BLD AUTO: 1.32 10*3/MM3 (ref 0.1–0.9)
MONOCYTES NFR BLD AUTO: 14.7 % (ref 5–12)
NEUTROPHILS NFR BLD AUTO: 5.24 10*3/MM3 (ref 1.7–7)
NEUTROPHILS NFR BLD AUTO: 58.2 % (ref 42.7–76)
NRBC BLD AUTO-RTO: 0 /100 WBC (ref 0–0.2)
OSMOLALITY UR: 345 MOSM/KG (ref 50–1400)
PHOSPHATE SERPL-MCNC: 5.9 MG/DL (ref 2.5–4.5)
PLATELET # BLD AUTO: 262 10*3/MM3 (ref 140–450)
PMV BLD AUTO: 10.8 FL (ref 6–12)
POTASSIUM SERPL-SCNC: 4.4 MMOL/L (ref 3.5–5.2)
PROT SERPL-MCNC: 6.8 G/DL (ref 6–8.5)
PROT UR-MCNC: 9.7 MG/DL
RBC # BLD AUTO: 3.74 10*6/MM3 (ref 4.14–5.8)
SODIUM SERPL-SCNC: 137 MMOL/L (ref 136–145)
SODIUM UR-SCNC: 50 MMOL/L
WBC # BLD AUTO: 9 10*3/MM3 (ref 3.4–10.8)

## 2021-05-09 PROCEDURE — 83935 ASSAY OF URINE OSMOLALITY: CPT | Performed by: INTERNAL MEDICINE

## 2021-05-09 PROCEDURE — 82306 VITAMIN D 25 HYDROXY: CPT | Performed by: INTERNAL MEDICINE

## 2021-05-09 PROCEDURE — 84100 ASSAY OF PHOSPHORUS: CPT | Performed by: INTERNAL MEDICINE

## 2021-05-09 PROCEDURE — 85025 COMPLETE CBC W/AUTO DIFF WBC: CPT | Performed by: INTERNAL MEDICINE

## 2021-05-09 PROCEDURE — 96361 HYDRATE IV INFUSION ADD-ON: CPT

## 2021-05-09 PROCEDURE — 99218 PR INITIAL OBSERVATION CARE/DAY 30 MINUTES: CPT | Performed by: INTERNAL MEDICINE

## 2021-05-09 PROCEDURE — 82962 GLUCOSE BLOOD TEST: CPT

## 2021-05-09 PROCEDURE — 94799 UNLISTED PULMONARY SVC/PX: CPT

## 2021-05-09 PROCEDURE — 82570 ASSAY OF URINE CREATININE: CPT | Performed by: INTERNAL MEDICINE

## 2021-05-09 PROCEDURE — 63710000001 INSULIN LISPRO (HUMAN) PER 5 UNITS: Performed by: INTERNAL MEDICINE

## 2021-05-09 PROCEDURE — G0378 HOSPITAL OBSERVATION PER HR: HCPCS

## 2021-05-09 PROCEDURE — 80053 COMPREHEN METABOLIC PANEL: CPT | Performed by: INTERNAL MEDICINE

## 2021-05-09 PROCEDURE — 83735 ASSAY OF MAGNESIUM: CPT | Performed by: INTERNAL MEDICINE

## 2021-05-09 PROCEDURE — 84156 ASSAY OF PROTEIN URINE: CPT | Performed by: INTERNAL MEDICINE

## 2021-05-09 PROCEDURE — 94640 AIRWAY INHALATION TREATMENT: CPT

## 2021-05-09 PROCEDURE — 84300 ASSAY OF URINE SODIUM: CPT | Performed by: INTERNAL MEDICINE

## 2021-05-09 PROCEDURE — 74018 RADEX ABDOMEN 1 VIEW: CPT

## 2021-05-09 PROCEDURE — 96376 TX/PRO/DX INJ SAME DRUG ADON: CPT

## 2021-05-09 PROCEDURE — 87205 SMEAR GRAM STAIN: CPT | Performed by: INTERNAL MEDICINE

## 2021-05-09 RX ORDER — NYSTATIN 100000 U/G
CREAM TOPICAL EVERY 12 HOURS SCHEDULED
Status: DISCONTINUED | OUTPATIENT
Start: 2021-05-09 | End: 2021-05-11 | Stop reason: HOSPADM

## 2021-05-09 RX ORDER — INSULIN HUMAN 500 [IU]/ML
70 INJECTION, SOLUTION SUBCUTANEOUS
Status: ON HOLD | COMMUNITY
End: 2021-05-10

## 2021-05-09 RX ORDER — OXYCODONE AND ACETAMINOPHEN 10; 325 MG/1; MG/1
1 TABLET ORAL EVERY 4 HOURS PRN
Status: DISCONTINUED | OUTPATIENT
Start: 2021-05-09 | End: 2021-05-11 | Stop reason: HOSPADM

## 2021-05-09 RX ADMIN — SODIUM CHLORIDE 75 ML/HR: 9 INJECTION, SOLUTION INTRAVENOUS at 00:34

## 2021-05-09 RX ADMIN — BUDESONIDE 0.5 MG: 0.5 INHALANT RESPIRATORY (INHALATION) at 19:56

## 2021-05-09 RX ADMIN — OXYCODONE HYDROCHLORIDE AND ACETAMINOPHEN 1 TABLET: 10; 325 TABLET ORAL at 09:57

## 2021-05-09 RX ADMIN — DULOXETINE HYDROCHLORIDE 90 MG: 30 CAPSULE, DELAYED RELEASE ORAL at 14:03

## 2021-05-09 RX ADMIN — SUCRALFATE 1 G: 1 TABLET ORAL at 00:25

## 2021-05-09 RX ADMIN — OXYCODONE HYDROCHLORIDE AND ACETAMINOPHEN 1 TABLET: 10; 325 TABLET ORAL at 00:25

## 2021-05-09 RX ADMIN — BUDESONIDE 0.5 MG: 0.5 INHALANT RESPIRATORY (INHALATION) at 00:35

## 2021-05-09 RX ADMIN — PANTOPRAZOLE SODIUM 40 MG: 40 INJECTION, POWDER, FOR SOLUTION INTRAVENOUS at 06:05

## 2021-05-09 RX ADMIN — TAMSULOSIN HYDROCHLORIDE 0.4 MG: 0.4 CAPSULE ORAL at 14:03

## 2021-05-09 RX ADMIN — TRAZODONE HYDROCHLORIDE 100 MG: 100 TABLET ORAL at 20:41

## 2021-05-09 RX ADMIN — ROSUVASTATIN CALCIUM 40 MG: 20 TABLET, FILM COATED ORAL at 20:41

## 2021-05-09 RX ADMIN — TRAZODONE HYDROCHLORIDE 100 MG: 100 TABLET ORAL at 00:25

## 2021-05-09 RX ADMIN — COLCHICINE 0.6 MG: 0.6 TABLET ORAL at 09:56

## 2021-05-09 RX ADMIN — COLCHICINE 0.6 MG: 0.6 TABLET ORAL at 20:41

## 2021-05-09 RX ADMIN — SUCRALFATE 1 G: 1 TABLET ORAL at 20:41

## 2021-05-09 RX ADMIN — NYSTATIN: 100000 CREAM TOPICAL at 20:43

## 2021-05-09 RX ADMIN — ROSUVASTATIN CALCIUM 40 MG: 20 TABLET, FILM COATED ORAL at 00:25

## 2021-05-09 RX ADMIN — COLCHICINE 0.6 MG: 0.6 TABLET ORAL at 00:25

## 2021-05-09 RX ADMIN — SUCRALFATE 1 G: 1 TABLET ORAL at 17:41

## 2021-05-09 RX ADMIN — GABAPENTIN 300 MG: 300 CAPSULE ORAL at 20:41

## 2021-05-09 RX ADMIN — GABAPENTIN 300 MG: 300 CAPSULE ORAL at 15:54

## 2021-05-09 RX ADMIN — INSULIN LISPRO 5 UNITS: 100 INJECTION, SOLUTION INTRAVENOUS; SUBCUTANEOUS at 17:41

## 2021-05-09 RX ADMIN — OXYCODONE HYDROCHLORIDE AND ACETAMINOPHEN 1 TABLET: 10; 325 TABLET ORAL at 23:15

## 2021-05-09 RX ADMIN — SODIUM CHLORIDE 75 ML/HR: 9 INJECTION, SOLUTION INTRAVENOUS at 14:02

## 2021-05-09 RX ADMIN — OXYCODONE HYDROCHLORIDE AND ACETAMINOPHEN 1 TABLET: 10; 325 TABLET ORAL at 17:42

## 2021-05-09 RX ADMIN — CARVEDILOL 3.12 MG: 3.12 TABLET, FILM COATED ORAL at 17:41

## 2021-05-09 RX ADMIN — SODIUM CHLORIDE, PRESERVATIVE FREE 10 ML: 5 INJECTION INTRAVENOUS at 00:26

## 2021-05-09 RX ADMIN — OXYCODONE HYDROCHLORIDE AND ACETAMINOPHEN 1 TABLET: 10; 325 TABLET ORAL at 14:02

## 2021-05-09 RX ADMIN — BUDESONIDE 0.5 MG: 0.5 INHALANT RESPIRATORY (INHALATION) at 06:20

## 2021-05-09 RX ADMIN — GABAPENTIN 300 MG: 300 CAPSULE ORAL at 00:25

## 2021-05-10 ENCOUNTER — EPISODE CHANGES (OUTPATIENT)
Dept: CASE MANAGEMENT | Facility: OTHER | Age: 66
End: 2021-05-10

## 2021-05-10 ENCOUNTER — APPOINTMENT (OUTPATIENT)
Dept: ULTRASOUND IMAGING | Facility: HOSPITAL | Age: 66
End: 2021-05-10

## 2021-05-10 PROBLEM — K59.00 CONSTIPATION: Status: ACTIVE | Noted: 2021-05-10

## 2021-05-10 LAB
ANION GAP SERPL CALCULATED.3IONS-SCNC: 10 MMOL/L (ref 5–15)
BUN SERPL-MCNC: 56 MG/DL (ref 8–23)
BUN/CREAT SERPL: 24 (ref 7–25)
CALCIUM SPEC-SCNC: 8.8 MG/DL (ref 8.6–10.5)
CHLORIDE SERPL-SCNC: 100 MMOL/L (ref 98–107)
CO2 SERPL-SCNC: 27 MMOL/L (ref 22–29)
CREAT SERPL-MCNC: 2.33 MG/DL (ref 0.76–1.27)
CREAT UR-MCNC: 55.2 MG/DL
DEPRECATED RDW RBC AUTO: 44 FL (ref 37–54)
EOSINOPHIL SPEC QL MICRO: 0 % EOS/100 CELLS (ref 0–0)
ERYTHROCYTE [DISTWIDTH] IN BLOOD BY AUTOMATED COUNT: 13.5 % (ref 12.3–15.4)
GFR SERPL CREATININE-BSD FRML MDRD: 28 ML/MIN/1.73
GLUCOSE BLDC GLUCOMTR-MCNC: 243 MG/DL (ref 70–130)
GLUCOSE BLDC GLUCOMTR-MCNC: 277 MG/DL (ref 70–130)
GLUCOSE BLDC GLUCOMTR-MCNC: 278 MG/DL (ref 70–130)
GLUCOSE BLDC GLUCOMTR-MCNC: 352 MG/DL (ref 70–130)
GLUCOSE SERPL-MCNC: 296 MG/DL (ref 65–99)
HBA1C MFR BLD: 9 % (ref 4.8–5.6)
HCT VFR BLD AUTO: 34.5 % (ref 37.5–51)
HGB BLD-MCNC: 11.2 G/DL (ref 13–17.7)
MCH RBC QN AUTO: 28.8 PG (ref 26.6–33)
MCHC RBC AUTO-ENTMCNC: 32.5 G/DL (ref 31.5–35.7)
MCV RBC AUTO: 88.7 FL (ref 79–97)
PLATELET # BLD AUTO: 233 10*3/MM3 (ref 140–450)
PMV BLD AUTO: 11.3 FL (ref 6–12)
POTASSIUM SERPL-SCNC: 5.3 MMOL/L (ref 3.5–5.2)
RBC # BLD AUTO: 3.89 10*6/MM3 (ref 4.14–5.8)
SODIUM SERPL-SCNC: 137 MMOL/L (ref 136–145)
WBC # BLD AUTO: 6.33 10*3/MM3 (ref 3.4–10.8)

## 2021-05-10 PROCEDURE — 25010000002 ONDANSETRON PER 1 MG: Performed by: INTERNAL MEDICINE

## 2021-05-10 PROCEDURE — 80048 BASIC METABOLIC PNL TOTAL CA: CPT | Performed by: NURSE PRACTITIONER

## 2021-05-10 PROCEDURE — 96361 HYDRATE IV INFUSION ADD-ON: CPT

## 2021-05-10 PROCEDURE — G0378 HOSPITAL OBSERVATION PER HR: HCPCS

## 2021-05-10 PROCEDURE — 96376 TX/PRO/DX INJ SAME DRUG ADON: CPT

## 2021-05-10 PROCEDURE — 63710000001 INSULIN LISPRO (HUMAN) PER 5 UNITS: Performed by: INTERNAL MEDICINE

## 2021-05-10 PROCEDURE — 63710000001 INSULIN DETEMIR PER 5 UNITS: Performed by: NURSE PRACTITIONER

## 2021-05-10 PROCEDURE — 82962 GLUCOSE BLOOD TEST: CPT

## 2021-05-10 PROCEDURE — 94799 UNLISTED PULMONARY SVC/PX: CPT

## 2021-05-10 PROCEDURE — 76775 US EXAM ABDO BACK WALL LIM: CPT

## 2021-05-10 PROCEDURE — 85027 COMPLETE CBC AUTOMATED: CPT | Performed by: NURSE PRACTITIONER

## 2021-05-10 PROCEDURE — 82043 UR ALBUMIN QUANTITATIVE: CPT | Performed by: INTERNAL MEDICINE

## 2021-05-10 PROCEDURE — 82570 ASSAY OF URINE CREATININE: CPT | Performed by: INTERNAL MEDICINE

## 2021-05-10 PROCEDURE — 83036 HEMOGLOBIN GLYCOSYLATED A1C: CPT | Performed by: NURSE PRACTITIONER

## 2021-05-10 RX ORDER — POLYETHYLENE GLYCOL 3350 17 G/17G
17 POWDER, FOR SOLUTION ORAL DAILY
Status: DISCONTINUED | OUTPATIENT
Start: 2021-05-10 | End: 2021-05-11 | Stop reason: HOSPADM

## 2021-05-10 RX ORDER — DULOXETIN HYDROCHLORIDE 60 MG/1
60 CAPSULE, DELAYED RELEASE ORAL DAILY
Status: ON HOLD | COMMUNITY
End: 2022-08-06

## 2021-05-10 RX ORDER — PANTOPRAZOLE SODIUM 40 MG/1
40 TABLET, DELAYED RELEASE ORAL
Status: DISCONTINUED | OUTPATIENT
Start: 2021-05-10 | End: 2021-05-11 | Stop reason: HOSPADM

## 2021-05-10 RX ORDER — COLCHICINE 0.6 MG/1
0.6 TABLET ORAL DAILY
COMMUNITY
End: 2021-08-11

## 2021-05-10 RX ORDER — BUMETANIDE 1 MG/1
1 TABLET ORAL DAILY
Status: ON HOLD | COMMUNITY
End: 2021-12-09

## 2021-05-10 RX ORDER — PANTOPRAZOLE SODIUM 40 MG/1
40 TABLET, DELAYED RELEASE ORAL
Status: DISCONTINUED | OUTPATIENT
Start: 2021-05-11 | End: 2021-05-10

## 2021-05-10 RX ADMIN — NYSTATIN: 100000 CREAM TOPICAL at 09:06

## 2021-05-10 RX ADMIN — SUCRALFATE 1 G: 1 TABLET ORAL at 20:19

## 2021-05-10 RX ADMIN — TRAZODONE HYDROCHLORIDE 100 MG: 100 TABLET ORAL at 20:19

## 2021-05-10 RX ADMIN — INSULIN LISPRO 8 UNITS: 100 INJECTION, SOLUTION INTRAVENOUS; SUBCUTANEOUS at 16:55

## 2021-05-10 RX ADMIN — SUCRALFATE 1 G: 1 TABLET ORAL at 16:43

## 2021-05-10 RX ADMIN — OXYCODONE HYDROCHLORIDE AND ACETAMINOPHEN 1 TABLET: 10; 325 TABLET ORAL at 20:23

## 2021-05-10 RX ADMIN — DULOXETINE HYDROCHLORIDE 90 MG: 30 CAPSULE, DELAYED RELEASE ORAL at 09:05

## 2021-05-10 RX ADMIN — INSULIN LISPRO 5 UNITS: 100 INJECTION, SOLUTION INTRAVENOUS; SUBCUTANEOUS at 11:02

## 2021-05-10 RX ADMIN — SUCRALFATE 1 G: 1 TABLET ORAL at 10:54

## 2021-05-10 RX ADMIN — INSULIN LISPRO 8 UNITS: 100 INJECTION, SOLUTION INTRAVENOUS; SUBCUTANEOUS at 09:02

## 2021-05-10 RX ADMIN — OXYCODONE HYDROCHLORIDE AND ACETAMINOPHEN 1 TABLET: 10; 325 TABLET ORAL at 09:14

## 2021-05-10 RX ADMIN — SODIUM CHLORIDE 75 ML/HR: 9 INJECTION, SOLUTION INTRAVENOUS at 16:24

## 2021-05-10 RX ADMIN — BUDESONIDE 0.5 MG: 0.5 INHALANT RESPIRATORY (INHALATION) at 19:35

## 2021-05-10 RX ADMIN — OXYCODONE HYDROCHLORIDE AND ACETAMINOPHEN 1 TABLET: 10; 325 TABLET ORAL at 15:18

## 2021-05-10 RX ADMIN — PANTOPRAZOLE SODIUM 40 MG: 40 INJECTION, POWDER, FOR SOLUTION INTRAVENOUS at 05:02

## 2021-05-10 RX ADMIN — ROSUVASTATIN CALCIUM 40 MG: 20 TABLET, FILM COATED ORAL at 20:19

## 2021-05-10 RX ADMIN — POLYETHYLENE GLYCOL 3350 17 G: 17 POWDER, FOR SOLUTION ORAL at 09:04

## 2021-05-10 RX ADMIN — PANTOPRAZOLE SODIUM 40 MG: 40 TABLET, DELAYED RELEASE ORAL at 16:42

## 2021-05-10 RX ADMIN — SODIUM CHLORIDE, PRESERVATIVE FREE 10 ML: 5 INJECTION INTRAVENOUS at 09:06

## 2021-05-10 RX ADMIN — CARVEDILOL 3.12 MG: 3.12 TABLET, FILM COATED ORAL at 09:04

## 2021-05-10 RX ADMIN — BUDESONIDE 0.5 MG: 0.5 INHALANT RESPIRATORY (INHALATION) at 05:43

## 2021-05-10 RX ADMIN — INSULIN DETEMIR 10 UNITS: 100 INJECTION, SOLUTION SUBCUTANEOUS at 20:18

## 2021-05-10 RX ADMIN — CARVEDILOL 3.12 MG: 3.12 TABLET, FILM COATED ORAL at 17:01

## 2021-05-10 RX ADMIN — METHYLCELLULOSE 1000 MG: 500 TABLET ORAL at 14:53

## 2021-05-10 RX ADMIN — GABAPENTIN 300 MG: 300 CAPSULE ORAL at 20:19

## 2021-05-10 RX ADMIN — COLCHICINE 0.6 MG: 0.6 TABLET ORAL at 09:05

## 2021-05-10 RX ADMIN — TAMSULOSIN HYDROCHLORIDE 0.4 MG: 0.4 CAPSULE ORAL at 09:05

## 2021-05-10 RX ADMIN — GABAPENTIN 300 MG: 300 CAPSULE ORAL at 09:08

## 2021-05-10 RX ADMIN — COLCHICINE 0.6 MG: 0.6 TABLET ORAL at 20:19

## 2021-05-10 RX ADMIN — GABAPENTIN 300 MG: 300 CAPSULE ORAL at 16:24

## 2021-05-10 RX ADMIN — ONDANSETRON HYDROCHLORIDE 4 MG: 2 SOLUTION INTRAMUSCULAR; INTRAVENOUS at 10:53

## 2021-05-10 RX ADMIN — SODIUM CHLORIDE 75 ML/HR: 9 INJECTION, SOLUTION INTRAVENOUS at 02:26

## 2021-05-11 VITALS
DIASTOLIC BLOOD PRESSURE: 62 MMHG | WEIGHT: 315 LBS | TEMPERATURE: 97.8 F | OXYGEN SATURATION: 98 % | HEIGHT: 72 IN | HEART RATE: 73 BPM | BODY MASS INDEX: 42.66 KG/M2 | SYSTOLIC BLOOD PRESSURE: 143 MMHG | RESPIRATION RATE: 16 BRPM

## 2021-05-11 LAB
ALBUMIN SERPL-MCNC: 3.7 G/DL (ref 3.5–5.2)
ALBUMIN UR-MCNC: 10.7 MG/DL
ALBUMIN/GLOB SERPL: 1.3 G/DL
ALP SERPL-CCNC: 73 U/L (ref 39–117)
ALT SERPL W P-5'-P-CCNC: 11 U/L (ref 1–41)
ANION GAP SERPL CALCULATED.3IONS-SCNC: 8 MMOL/L (ref 5–15)
AST SERPL-CCNC: 14 U/L (ref 1–40)
BASOPHILS # BLD AUTO: 0.03 10*3/MM3 (ref 0–0.2)
BASOPHILS NFR BLD AUTO: 0.5 % (ref 0–1.5)
BILIRUB SERPL-MCNC: 0.3 MG/DL (ref 0–1.2)
BUN SERPL-MCNC: 51 MG/DL (ref 8–23)
BUN/CREAT SERPL: 25.9 (ref 7–25)
CALCIUM SPEC-SCNC: 8.5 MG/DL (ref 8.6–10.5)
CHLORIDE SERPL-SCNC: 99 MMOL/L (ref 98–107)
CO2 SERPL-SCNC: 26 MMOL/L (ref 22–29)
CREAT SERPL-MCNC: 1.97 MG/DL (ref 0.76–1.27)
CREAT UR-MCNC: 115.2 MG/DL
DEPRECATED RDW RBC AUTO: 42.9 FL (ref 37–54)
EOSINOPHIL # BLD AUTO: 0.3 10*3/MM3 (ref 0–0.4)
EOSINOPHIL NFR BLD AUTO: 5.3 % (ref 0.3–6.2)
ERYTHROCYTE [DISTWIDTH] IN BLOOD BY AUTOMATED COUNT: 13.3 % (ref 12.3–15.4)
GFR SERPL CREATININE-BSD FRML MDRD: 34 ML/MIN/1.73
GLOBULIN UR ELPH-MCNC: 2.8 GM/DL
GLUCOSE BLDC GLUCOMTR-MCNC: 269 MG/DL (ref 70–130)
GLUCOSE BLDC GLUCOMTR-MCNC: 294 MG/DL (ref 70–130)
GLUCOSE SERPL-MCNC: 293 MG/DL (ref 65–99)
HCT VFR BLD AUTO: 32 % (ref 37.5–51)
HGB BLD-MCNC: 10.7 G/DL (ref 13–17.7)
IMM GRANULOCYTES # BLD AUTO: 0.02 10*3/MM3 (ref 0–0.05)
IMM GRANULOCYTES NFR BLD AUTO: 0.4 % (ref 0–0.5)
LYMPHOCYTES # BLD AUTO: 1.47 10*3/MM3 (ref 0.7–3.1)
LYMPHOCYTES NFR BLD AUTO: 26.2 % (ref 19.6–45.3)
MCH RBC QN AUTO: 29.4 PG (ref 26.6–33)
MCHC RBC AUTO-ENTMCNC: 33.4 G/DL (ref 31.5–35.7)
MCV RBC AUTO: 87.9 FL (ref 79–97)
MICROALBUMIN/CREAT UR: 92.9 MG/G
MONOCYTES # BLD AUTO: 0.59 10*3/MM3 (ref 0.1–0.9)
MONOCYTES NFR BLD AUTO: 10.5 % (ref 5–12)
NEUTROPHILS NFR BLD AUTO: 3.21 10*3/MM3 (ref 1.7–7)
NEUTROPHILS NFR BLD AUTO: 57.1 % (ref 42.7–76)
NRBC BLD AUTO-RTO: 0 /100 WBC (ref 0–0.2)
PLATELET # BLD AUTO: 225 10*3/MM3 (ref 140–450)
PMV BLD AUTO: 11.6 FL (ref 6–12)
POTASSIUM SERPL-SCNC: 5 MMOL/L (ref 3.5–5.2)
PROT SERPL-MCNC: 6.5 G/DL (ref 6–8.5)
RBC # BLD AUTO: 3.64 10*6/MM3 (ref 4.14–5.8)
SODIUM SERPL-SCNC: 133 MMOL/L (ref 136–145)
WBC # BLD AUTO: 5.62 10*3/MM3 (ref 3.4–10.8)

## 2021-05-11 PROCEDURE — 82962 GLUCOSE BLOOD TEST: CPT

## 2021-05-11 PROCEDURE — 94799 UNLISTED PULMONARY SVC/PX: CPT

## 2021-05-11 PROCEDURE — 25010000002 METHYLNALTREXONE 12 MG/0.6ML SOLUTION: Performed by: NURSE PRACTITIONER

## 2021-05-11 PROCEDURE — 96372 THER/PROPH/DIAG INJ SC/IM: CPT

## 2021-05-11 PROCEDURE — 96361 HYDRATE IV INFUSION ADD-ON: CPT

## 2021-05-11 PROCEDURE — 80053 COMPREHEN METABOLIC PANEL: CPT | Performed by: INTERNAL MEDICINE

## 2021-05-11 PROCEDURE — G0378 HOSPITAL OBSERVATION PER HR: HCPCS

## 2021-05-11 PROCEDURE — 85025 COMPLETE CBC W/AUTO DIFF WBC: CPT | Performed by: INTERNAL MEDICINE

## 2021-05-11 PROCEDURE — 63710000001 INSULIN LISPRO (HUMAN) PER 5 UNITS: Performed by: INTERNAL MEDICINE

## 2021-05-11 RX ORDER — ONDANSETRON 4 MG/1
4 TABLET, ORALLY DISINTEGRATING ORAL EVERY 8 HOURS PRN
Qty: 21 TABLET | Refills: 0 | Status: SHIPPED | OUTPATIENT
Start: 2021-05-11 | End: 2021-08-11

## 2021-05-11 RX ORDER — POLYETHYLENE GLYCOL 3350 17 G/17G
17 POWDER, FOR SOLUTION ORAL DAILY
Qty: 510 G | Refills: 0 | Status: SHIPPED | OUTPATIENT
Start: 2021-05-12 | End: 2021-06-11

## 2021-05-11 RX ORDER — DOCUSATE SODIUM 100 MG/1
100 CAPSULE, LIQUID FILLED ORAL 2 TIMES DAILY
Status: DISCONTINUED | OUTPATIENT
Start: 2021-05-11 | End: 2021-05-11 | Stop reason: HOSPADM

## 2021-05-11 RX ORDER — METHYLNALTREXONE BROMIDE 150 MG/1
150 TABLET ORAL DAILY PRN
Qty: 30 TABLET | Refills: 0 | Status: SHIPPED | OUTPATIENT
Start: 2021-05-11 | End: 2021-06-10

## 2021-05-11 RX ORDER — PANTOPRAZOLE SODIUM 40 MG/1
40 TABLET, DELAYED RELEASE ORAL
Qty: 60 TABLET | Refills: 0 | Status: SHIPPED | OUTPATIENT
Start: 2021-05-11 | End: 2021-07-07

## 2021-05-11 RX ADMIN — TAMSULOSIN HYDROCHLORIDE 0.4 MG: 0.4 CAPSULE ORAL at 08:07

## 2021-05-11 RX ADMIN — SUCRALFATE 1 G: 1 TABLET ORAL at 12:01

## 2021-05-11 RX ADMIN — DOCUSATE SODIUM 100 MG: 100 CAPSULE ORAL at 09:44

## 2021-05-11 RX ADMIN — GABAPENTIN 300 MG: 300 CAPSULE ORAL at 08:08

## 2021-05-11 RX ADMIN — COLCHICINE 0.6 MG: 0.6 TABLET ORAL at 08:07

## 2021-05-11 RX ADMIN — DULOXETINE HYDROCHLORIDE 90 MG: 30 CAPSULE, DELAYED RELEASE ORAL at 08:07

## 2021-05-11 RX ADMIN — METHYLCELLULOSE 1000 MG: 500 TABLET ORAL at 08:08

## 2021-05-11 RX ADMIN — SUCRALFATE 1 G: 1 TABLET ORAL at 05:53

## 2021-05-11 RX ADMIN — METHYLNALTREXONE BROMIDE 6 MG: 12 INJECTION, SOLUTION SUBCUTANEOUS at 09:44

## 2021-05-11 RX ADMIN — SODIUM CHLORIDE 75 ML/HR: 9 INJECTION, SOLUTION INTRAVENOUS at 05:53

## 2021-05-11 RX ADMIN — BUDESONIDE 0.5 MG: 0.5 INHALANT RESPIRATORY (INHALATION) at 05:34

## 2021-05-11 RX ADMIN — POLYETHYLENE GLYCOL 3350 17 G: 17 POWDER, FOR SOLUTION ORAL at 08:08

## 2021-05-11 RX ADMIN — CARVEDILOL 3.12 MG: 3.12 TABLET, FILM COATED ORAL at 08:07

## 2021-05-11 RX ADMIN — INSULIN LISPRO 8 UNITS: 100 INJECTION, SOLUTION INTRAVENOUS; SUBCUTANEOUS at 12:01

## 2021-05-11 RX ADMIN — INSULIN LISPRO 8 UNITS: 100 INJECTION, SOLUTION INTRAVENOUS; SUBCUTANEOUS at 08:08

## 2021-05-11 RX ADMIN — PANTOPRAZOLE SODIUM 40 MG: 40 TABLET, DELAYED RELEASE ORAL at 08:08

## 2021-05-12 ENCOUNTER — TELEPHONE (OUTPATIENT)
Dept: GASTROENTEROLOGY | Facility: CLINIC | Age: 66
End: 2021-05-12

## 2021-05-12 ENCOUNTER — READMISSION MANAGEMENT (OUTPATIENT)
Dept: CALL CENTER | Facility: HOSPITAL | Age: 66
End: 2021-05-12

## 2021-05-12 NOTE — OUTREACH NOTE
Prep Survey      Responses   [facility] patient discharged from?  [facility]   Is LACE score < 7 ?  No   Emergency Room discharge w/ pulse ox?  No   Eligibility  Readm Mgmt   Discharge diagnosis  WANDER/CKD   Does the patient have one of the following disease processes/diagnoses(primary or secondary)?  Other   Does the patient have Home health ordered?  No   Is there a DME ordered?  No   Comments regarding appointments  call for apmts   Medication alerts for this patient  see AVS   Prep survey completed?  Yes          Shaniqua Mccord RN

## 2021-05-21 ENCOUNTER — READMISSION MANAGEMENT (OUTPATIENT)
Dept: CALL CENTER | Facility: HOSPITAL | Age: 66
End: 2021-05-21

## 2021-05-21 NOTE — OUTREACH NOTE
Medical Week 2 Survey      Responses   [facility] patient discharged from?  [place]   Does the patient have one of the following disease processes/diagnoses(primary or secondary)?  Other   Week 2 attempt successful?  Yes   Call start time  0943   Discharge diagnosis  WANDER/CKD   Rescheduled  Rescheduled-pt requested          Ree Boone RN

## 2021-05-26 ENCOUNTER — TELEPHONE (OUTPATIENT)
Dept: ENDOCRINOLOGY | Facility: CLINIC | Age: 66
End: 2021-05-26

## 2021-05-26 ENCOUNTER — READMISSION MANAGEMENT (OUTPATIENT)
Dept: CALL CENTER | Facility: HOSPITAL | Age: 66
End: 2021-05-26

## 2021-05-26 NOTE — OUTREACH NOTE
Medical Week 2 Survey      Responses   [facility] [place] patient discharged from?  [place]   Does the patient have one of the following disease processes/diagnoses(primary or secondary)?  Other   Week 2 attempt successful?  Yes   Call start time  1246   Discharge diagnosis  WANDER/CKD   Call end time  1249   Is patient permission given to speak with other caregiver?  Yes   Person spoke with today (if not patient) and relationship  Joan   Meds reviewed with patient/caregiver?  Yes   Is the patient having any side effects they believe may be caused by any medication additions or changes?  No   Does the patient have all medications ordered at discharge?  Yes   Is the patient taking all medications as directed (includes completed medication regime)?  Yes   Does the patient have a primary care provider?   Yes   Does the patient have an appointment with their PCP within 7 days of discharge?  Yes   Has the patient kept scheduled appointments due by today?  Yes   Has home health visited the patient within 72 hours of discharge?  N/A   Psychosocial issues?  No   Did the patient receive a copy of their discharge instructions?  Yes   Nursing interventions  Reviewed instructions with patient   What is the patient's perception of their health status since discharge?  Improving   Is the patient/caregiver able to teach back signs and symptoms related to disease process for when to call PCP?  Yes   Is the patient/caregiver able to teach back signs and symptoms related to disease process for when to call 911?  Yes   Is the patient/caregiver able to teach back the hierarchy of who to call/visit for symptoms/problems? PCP, Specialist, Home health nurse, Urgent Care, ED, 911  Yes   If the patient is a current smoker, are they able to teach back resources for cessation?  Not a smoker   Additional teach back comments  Unsure about his BS, says regular BM's now. He is ambulating well, eating ok, upper Abd is still hurting she says.   Week  2 Call Completed?  Yes   Wrap up additional comments  He is doing better but stays with some level of pain all the time.          Madeline Weir RN

## 2021-06-03 ENCOUNTER — READMISSION MANAGEMENT (OUTPATIENT)
Dept: CALL CENTER | Facility: HOSPITAL | Age: 66
End: 2021-06-03

## 2021-06-03 NOTE — OUTREACH NOTE
Medical Week 3 Survey      Responses   [facility] patient discharged from?  [place]   Does the patient have one of the following disease processes/diagnoses(primary or secondary)?  Other   Week 3 attempt successful?  Yes   Call start time  1548   Revoke  Decline to participate [hung up]   Call end time  1548   Discharge diagnosis  WANDER/CKD          Iesha Jenkins RN

## 2021-06-18 ENCOUNTER — TRANSCRIBE ORDERS (OUTPATIENT)
Dept: ADMINISTRATIVE | Facility: HOSPITAL | Age: 66
End: 2021-06-18

## 2021-06-18 DIAGNOSIS — G54.2 LESION OF LEFT CERVICAL NERVE ROOT: Primary | ICD-10-CM

## 2021-07-08 ENCOUNTER — APPOINTMENT (OUTPATIENT)
Dept: MRI IMAGING | Facility: HOSPITAL | Age: 66
End: 2021-07-08

## 2021-07-21 ENCOUNTER — HOSPITAL ENCOUNTER (OUTPATIENT)
Dept: MRI IMAGING | Facility: HOSPITAL | Age: 66
Discharge: HOME | End: 2021-07-21
Admitting: FAMILY MEDICINE

## 2021-07-21 PROCEDURE — 72141 MRI NECK SPINE W/O DYE: CPT

## 2021-07-21 PROCEDURE — 82565 ASSAY OF CREATININE: CPT

## 2021-07-22 LAB — CREAT BLDA-MCNC: 2.3 MG/DL (ref 0.6–1.3)

## 2021-08-02 ENCOUNTER — TELEPHONE (OUTPATIENT)
Dept: NEUROSURGERY | Facility: CLINIC | Age: 66
End: 2021-08-02

## 2021-08-02 NOTE — TELEPHONE ENCOUNTER
We have received a referral from Dr. Shetty to schedule patient for an appointment, tried to reach him at both his home and cell number, no answer, but I have left message for a call back.

## 2021-08-03 NOTE — TELEPHONE ENCOUNTER
JoanErick durán's wife called back and left message asking that we call her on her cell# [phone], called back and had to leave message.  This is about making an appointment with SUSAN Garcia

## 2021-08-11 ENCOUNTER — OFFICE VISIT (OUTPATIENT)
Dept: NEUROSURGERY | Facility: CLINIC | Age: 66
End: 2021-08-11

## 2021-08-11 ENCOUNTER — HOSPITAL ENCOUNTER (OUTPATIENT)
Dept: GENERAL RADIOLOGY | Facility: HOSPITAL | Age: 66
Discharge: HOME | End: 2021-08-11
Admitting: NURSE PRACTITIONER

## 2021-08-11 VITALS
SYSTOLIC BLOOD PRESSURE: 142 MMHG | HEIGHT: 72 IN | BODY MASS INDEX: 42.66 KG/M2 | DIASTOLIC BLOOD PRESSURE: 70 MMHG | WEIGHT: 315 LBS

## 2021-08-11 DIAGNOSIS — M50.30 DEGENERATION OF CERVICAL INTERVERTEBRAL DISC: ICD-10-CM

## 2021-08-11 DIAGNOSIS — Z78.9 NON-SMOKER: ICD-10-CM

## 2021-08-11 DIAGNOSIS — M50.30 DEGENERATION OF CERVICAL INTERVERTEBRAL DISC: Primary | ICD-10-CM

## 2021-08-11 DIAGNOSIS — E66.01 CLASS 3 SEVERE OBESITY DUE TO EXCESS CALORIES WITH BODY MASS INDEX (BMI) OF 45.0 TO 49.9 IN ADULT, UNSPECIFIED WHETHER SERIOUS COMORBIDITY PRESENT (HCC): ICD-10-CM

## 2021-08-11 PROBLEM — E66.813 CLASS 3 SEVERE OBESITY DUE TO EXCESS CALORIES WITH BODY MASS INDEX (BMI) OF 45.0 TO 49.9 IN ADULT: Status: ACTIVE | Noted: 2019-07-24

## 2021-08-11 PROCEDURE — 72052 X-RAY EXAM NECK SPINE 6/>VWS: CPT

## 2021-08-11 PROCEDURE — 99214 OFFICE O/P EST MOD 30 MIN: CPT | Performed by: NURSE PRACTITIONER

## 2021-08-11 RX ORDER — PANTOPRAZOLE SODIUM 40 MG/1
40 TABLET, DELAYED RELEASE ORAL DAILY
Status: ON HOLD | COMMUNITY
End: 2021-12-09

## 2021-08-11 RX ORDER — TAMSULOSIN HYDROCHLORIDE 0.4 MG/1
1 CAPSULE ORAL DAILY
Status: ON HOLD | COMMUNITY
End: 2022-08-06
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
## 2021-08-11 NOTE — PATIENT INSTRUCTIONS
"BMI for Adults  What is BMI?  Body mass index (BMI) is a number that is calculated from a person's weight and height. BMI can help estimate how much of a person's weight is composed of fat. BMI does not measure body fat directly. Rather, it is an alternative to procedures that directly measure body fat, which can be difficult and expensive.  BMI can help identify people who may be at higher risk for certain medical problems.  What are BMI measurements used for?  BMI is used as a screening tool to identify possible weight problems. It helps determine whether a person is obese, overweight, a healthy weight, or underweight.  BMI is useful for:  · Identifying a weight problem that may be related to a medical condition or may increase the risk for medical problems.  · Promoting changes, such as changes in diet and exercise, to help reach a healthy weight. BMI screening can be repeated to see if these changes are working.  How is BMI calculated?  BMI involves measuring your weight in relation to your height. Both height and weight are measured, and the BMI is calculated from those numbers. This can be done either in English ([place] or metric measurements. Note that charts and online BMI calculators are available to help you find your BMI quickly and easily without having to do these calculations yourself.  To calculate your BMI in English ([place] measurements:    1. Measure your weight in pounds (lb).  2. Multiply the number of pounds by 703.  ? For example, for a person who weighs 180 lb, multiply that number by 703, which equals 126,540.  3. Measure your height in inches. Then multiply that number by itself to get a measurement called \"inches squared.\"  ? For example, for a person who is 70 inches tall, the \"inches squared\" measurement is 70 inches x 70 inches, which equals 4,900 inches squared.  4. Divide the total from step 2 (number of lb x 703) by the total from step 3 (inches squared): 126,540 ÷ 4,900 = 25.8. This is " "your BMI.  To calculate your BMI in metric measurements:  1. Measure your weight in kilograms (kg).  2. Measure your height in meters (m). Then multiply that number by itself to get a measurement called \"meters squared.\"  ? For example, for a person who is 1.75 m tall, the \"meters squared\" measurement is 1.75 m x 1.75 m, which is equal to 3.1 meters squared.  3. Divide the number of kilograms (your weight) by the meters squared number. In this example: 70 ÷ 3.1 = 22.6. This is your BMI.  What do the results mean?  BMI charts are used to identify whether you are underweight, normal weight, overweight, or obese. The following guidelines will be used:  · Underweight: BMI less than 18.5.  · Normal weight: BMI between 18.5 and 24.9.  · Overweight: BMI between 25 and 29.9.  · Obese: BMI of 30 or above.  Keep these notes in mind:  · Weight includes both fat and muscle, so someone with a muscular build, such as an athlete, may have a BMI that is higher than 24.9. In cases like these, BMI is not an accurate measure of body fat.  · To determine if excess body fat is the cause of a BMI of 25 or higher, further assessments may need to be done by a health care provider.  · BMI is usually interpreted in the same way for men and women.  Where to find more information  For more information about BMI, including tools to quickly calculate your BMI, go to these websites:  · Centers for Disease Control and Prevention: www.cdc.gov  · American Heart Association: www.heart.org  · National Heart, Lung, and Blood [place]: www.nhlbi.nih.gov  Summary  · Body mass index (BMI) is a number that is calculated from a person's weight and height.  · BMI may help estimate how much of a person's weight is composed of fat. BMI can help identify those who may be at higher risk for certain medical problems.  · BMI can be measured using English measurements or metric measurements.  · BMI charts are used to identify whether you are underweight, normal " "weight, overweight, or obese.  This information is not intended to replace advice given to you by your health care provider. Make sure you discuss any questions you have with your health care provider.  Document Revised: 09/09/2020 Document Reviewed: 07/17/2020  [employer] Patient Education © 2021 [employer]      https://www.nhlbi.nih.gov/files/docs/public/heart/dash_brief.pdf\">   DASH Eating Plan  DASH stands for Dietary Approaches to Stop Hypertension. The DASH eating plan is a healthy eating plan that has been shown to:  · Reduce high blood pressure (hypertension).  · Reduce your risk for type 2 diabetes, heart disease, and stroke.  · Help with weight loss.  What are tips for following this plan?  Reading food labels  · Check food labels for the amount of salt (sodium) per serving. Choose foods with less than 5 percent of the Daily Value of sodium. Generally, foods with less than 300 milligrams (mg) of sodium per serving fit into this eating plan.  · To find whole grains, look for the word \"whole\" as the first word in the ingredient list.  Shopping  · Buy products labeled as \"low-sodium\" or \"no salt added.\"  · Buy fresh foods. Avoid canned foods and pre-made or frozen meals.  Cooking  · Avoid adding salt when cooking. Use salt-free seasonings or herbs instead of table salt or sea salt. Check with your health care provider or pharmacist before using salt substitutes.  · Do not vera foods. Cook foods using healthy methods such as baking, boiling, grilling, roasting, and broiling instead.  · Cook with heart-healthy oils, such as olive, canola, avocado, soybean, or sunflower oil.  Meal planning    · Eat a balanced diet that includes:  ? 4 or more servings of fruits and 4 or more servings of vegetables each day. Try to fill one-half of your plate with fruits and vegetables.  ? 6-8 servings of whole grains each day.  ? Less than 6 oz (170 g) of lean meat, poultry, or fish each day. A 3-oz (85-g) serving of meat is " about the same size as a deck of cards. One egg equals 1 oz (28 g).  ? 2-3 servings of low-fat dairy each day. One serving is 1 cup (237 mL).  ? 1 serving of nuts, seeds, or beans 5 times each week.  ? 2-3 servings of heart-healthy fats. Healthy fats called omega-3 fatty acids are found in foods such as walnuts, flaxseeds, fortified milks, and eggs. These fats are also found in cold-water fish, such as sardines, salmon, and mackerel.  · Limit how much you eat of:  ? Canned or prepackaged foods.  ? Food that is high in trans fat, such as some fried foods.  ? Food that is high in saturated fat, such as fatty meat.  ? Desserts and other sweets, sugary drinks, and other foods with added sugar.  ? Full-fat dairy products.  · Do not salt foods before eating.  · Do not eat more than 4 egg yolks a week.  · Try to eat at least 2 vegetarian meals a week.  · Eat more home-cooked food and less restaurant, buffet, and fast food.  Lifestyle  · When eating at a restaurant, ask that your food be prepared with less salt or no salt, if possible.  · If you drink alcohol:  ? Limit how much you use to:  § 0-1 drink a day for women who are not pregnant.  § 0-2 drinks a day for men.  ? Be aware of how much alcohol is in your drink. In the [place] one drink equals one 12 oz bottle of beer (355 mL), one 5 oz glass of wine (148 mL), or one 1½ oz glass of hard liquor (44 mL).  General information  · Avoid eating more than 2,300 mg of salt a day. If you have hypertension, you may need to reduce your sodium intake to 1,500 mg a day.  · Work with your health care provider to maintain a healthy body weight or to lose weight. Ask what an ideal weight is for you.  · Get at least 30 minutes of exercise that causes your heart to beat faster (aerobic exercise) most days of the week. Activities may include walking, swimming, or biking.  · Work with your health care provider or dietitian to adjust your eating plan to your individual calorie needs.  What  foods should I eat?  Fruits  All fresh, dried, or frozen fruit. Canned fruit in natural juice (without added sugar).  Vegetables  Fresh or frozen vegetables (raw, steamed, roasted, or grilled). Low-sodium or reduced-sodium tomato and vegetable juice. Low-sodium or reduced-sodium tomato sauce and tomato paste. Low-sodium or reduced-sodium canned vegetables.  Grains  Whole-grain or whole-wheat bread. Whole-grain or whole-wheat pasta. Brown rice. Oatmeal. Quinoa. Bulgur. Whole-grain and low-sodium cereals. Radha bread. Low-fat, low-sodium crackers. Whole-wheat flour tortillas.  Meats and other proteins  Skinless chicken or turkey. Ground chicken or turkey. Pork with fat trimmed off. Fish and seafood. Egg whites. Dried beans, peas, or lentils. Unsalted nuts, nut butters, and seeds. Unsalted canned beans. Lean cuts of beef with fat trimmed off. Low-sodium, lean precooked or cured meat, such as sausages or meat loaves.  Dairy  Low-fat (1%) or fat-free (skim) milk. Reduced-fat, low-fat, or fat-free cheeses. Nonfat, low-sodium ricotta or cottage cheese. Low-fat or nonfat yogurt. Low-fat, low-sodium cheese.  Fats and oils  Soft margarine without trans fats. Vegetable oil. Reduced-fat, low-fat, or light mayonnaise and salad dressings (reduced-sodium). Canola, safflower, olive, avocado, soybean, and sunflower oils. Avocado.  Seasonings and condiments  Herbs. Spices. Seasoning mixes without salt.  Other foods  Unsalted popcorn and pretzels. Fat-free sweets.  The items listed above may not be a complete list of foods and beverages you can eat. Contact a dietitian for more information.  What foods should I avoid?  Fruits  Canned fruit in a light or heavy syrup. Fried fruit. Fruit in cream or butter sauce.  Vegetables  Creamed or fried vegetables. Vegetables in a cheese sauce. Regular canned vegetables (not low-sodium or reduced-sodium). Regular canned tomato sauce and paste (not low-sodium or reduced-sodium). Regular tomato and  vegetable juice (not low-sodium or reduced-sodium). Pickles. Olives.  Grains  Baked goods made with fat, such as croissants, muffins, or some breads. Dry pasta or rice meal packs.  Meats and other proteins  Fatty cuts of meat. Ribs. Fried meat. Morfin. Bologna, salami, and other precooked or cured meats, such as sausages or meat loaves. Fat from the back of a pig (fatback). Bratwurst. Salted nuts and seeds. Canned beans with added salt. Canned or smoked fish. Whole eggs or egg yolks. Chicken or turkey with skin.  Dairy  Whole or 2% milk, cream, and half-and-half. Whole or full-fat cream cheese. Whole-fat or sweetened yogurt. Full-fat cheese. Nondairy creamers. Whipped toppings. Processed cheese and cheese spreads.  Fats and oils  Butter. Stick margarine. Lard. Shortening. Ghee. Morfin fat. Tropical oils, such as coconut, palm kernel, or palm oil.  Seasonings and condiments  Onion salt, garlic salt, seasoned salt, table salt, and sea salt. Worcestershire sauce. Tartar sauce. Barbecue sauce. Teriyaki sauce. Soy sauce, including reduced-sodium. Steak sauce. Canned and packaged gravies. Fish sauce. Oyster sauce. Cocktail sauce. Store-bought horseradish. Ketchup. Mustard. Meat flavorings and tenderizers. Bouillon cubes. Hot sauces. Pre-made or packaged marinades. Pre-made or packaged taco seasonings. Relishes. Regular salad dressings.  Other foods  Salted popcorn and pretzels.  The items listed above may not be a complete list of foods and beverages you should avoid. Contact a dietitian for more information.  Where to find more information  · National Heart, Lung, and Blood [place]: www.nhlbi.nih.gov  · American Heart Association: www.heart.org  · Academy of Nutrition and Dietetics: www.eatright.org  · National Kidney Foundation: www.kidney.org  Summary  · The DASH eating plan is a healthy eating plan that has been shown to reduce high blood pressure (hypertension). It may also reduce your risk for type 2 diabetes, heart  disease, and stroke.  · When on the DASH eating plan, aim to eat more fresh fruits and vegetables, whole grains, lean proteins, low-fat dairy, and heart-healthy fats.  · With the DASH eating plan, you should limit salt (sodium) intake to 2,300 mg a day. If you have hypertension, you may need to reduce your sodium intake to 1,500 mg a day.  · Work with your health care provider or dietitian to adjust your eating plan to your individual calorie needs.  This information is not intended to replace advice given to you by your health care provider. Make sure you discuss any questions you have with your health care provider.  Document Revised: 11/20/2020 Document Reviewed: 11/20/2020  [employer] Patient Education © 2021 [employer]      Advance Care Planning and Advance Directives     You make decisions on a daily basis - decisions about where you want to live, your career, your home, your life. Perhaps one of the most important decisions you face is your choice for future medical care. Take time to talk with your family and your healthcare team and start planning today.  Advance Care Planning is a process that can help you:  · Understand possible future healthcare decisions in light of your own experiences  · Reflect on those decision in light of your goals and values  · Discuss your decisions with those closest to you and the healthcare professionals that care for you  · Make a plan by creating a document that reflects your wishes    Surrogate Decision Maker  In the event of a medical emergency, which has left you unable to communicate or to make your own decisions, you would need someone to make decisions for you.  It is important to discuss your preferences for medical treatment with this person while you are in good health.     Qualities of a surrogate decision maker:  • Willing to take on this role and responsibility  • Knows what you want for future medical care  • Willing to follow your wishes even if they don't  agree with them  • Able to make difficult medical decisions under stressful circumstances    Advance Directives  These are legal documents you can create that will guide your healthcare team and decision maker(s) when needed. These documents can be stored in the electronic medical record.    · Living Will - a legal document to guide your care if you have a terminal condition or a serious illness and are unable to communicate. States vary by statute in document names/types, but most forms may include one or more of the following:        -  Directions regarding life-prolonging treatments        -  Directions regarding artificially provided nutrition/hydration        -  Choosing a healthcare decision maker        -  Direction regarding organ/tissue donation    · Durable Power of  for Healthcare - this document names an -in-fact to make medical decisions for you, but it may also allow this person to make personal and financial decisions for you. Please seek the advice of an  if you need this type of document.    **Advance Directives are not required and no one may discriminate against you if you do not sign one.    Medical Orders  Many states allow specific forms/orders signed by your physician to record your wishes for medical treatment in your current state of health. This form, signed in personal communication with your physician, addresses resuscitation and other medical interventions that you may or may not want.      For more information or to schedule a time with a [facility] Advance Care Planning Facilitator contact: [email or web address] or call [phone] and someone will contact you directly.

## 2021-08-18 ENCOUNTER — DOCUMENTATION (OUTPATIENT)
Dept: ENDOCRINOLOGY | Facility: CLINIC | Age: 66
End: 2021-08-18

## 2021-08-18 ENCOUNTER — OFFICE VISIT (OUTPATIENT)
Dept: ENDOCRINOLOGY | Facility: CLINIC | Age: 66
End: 2021-08-18

## 2021-08-18 VITALS
HEART RATE: 82 BPM | BODY MASS INDEX: 42.66 KG/M2 | WEIGHT: 315 LBS | OXYGEN SATURATION: 97 % | HEIGHT: 72 IN | SYSTOLIC BLOOD PRESSURE: 130 MMHG | DIASTOLIC BLOOD PRESSURE: 68 MMHG

## 2021-08-18 DIAGNOSIS — E11.65 TYPE 2 DIABETES MELLITUS WITH HYPERGLYCEMIA, WITH LONG-TERM CURRENT USE OF INSULIN (HCC): Primary | ICD-10-CM

## 2021-08-18 DIAGNOSIS — E78.2 MIXED HYPERLIPIDEMIA: ICD-10-CM

## 2021-08-18 DIAGNOSIS — E55.9 VITAMIN D DEFICIENCY: ICD-10-CM

## 2021-08-18 DIAGNOSIS — Z79.4 TYPE 2 DIABETES MELLITUS WITH HYPERGLYCEMIA, WITH LONG-TERM CURRENT USE OF INSULIN (HCC): Primary | ICD-10-CM

## 2021-08-18 DIAGNOSIS — E11.42 DIABETIC POLYNEUROPATHY ASSOCIATED WITH TYPE 2 DIABETES MELLITUS (HCC): ICD-10-CM

## 2021-08-18 PROCEDURE — 99214 OFFICE O/P EST MOD 30 MIN: CPT | Performed by: NURSE PRACTITIONER

## 2021-08-18 RX ORDER — INSULIN HUMAN 500 [IU]/ML
INJECTION, SOLUTION SUBCUTANEOUS
Qty: 18 ML | Refills: 11 | Status: SHIPPED | OUTPATIENT
Start: 2021-08-18 | End: 2021-12-24

## 2021-08-18 NOTE — PROGRESS NOTES
"Chief Complaint  Diabetes    Subjective          Erick Luong presents to [facility]  History of Present Illness     In office visit     Referring provider      Del Shetty MD       66 year old male presents for follow up      Reason diabetes mellitus type 2     Timing constant     Quality not controlled     Duration diagnosed at age 24 years     Severity high     Lab Results   Component Value Date    HGBA1C 9.00 (H) 05/10/2021               Macrovascular complication -  CAD, CABG times 4      Microvascular complications --neuropathy, CKD stage IV         Current regimen --- insulin         Current glucose monitoring device -- fingerstick's      Blood glucose readings      Checks 4 times daily      Hypoglycemia overnight     High during the day       Review of Systems - General ROS: positive for  - neck pain               Objective   Vital Signs:   /68   Pulse 82   Ht 182.9 cm (72\")   Wt (!) 154 kg (340 lb 8 oz)   SpO2 97%   BMI 46.18 kg/m²     Physical Exam  Constitutional:       Appearance: Normal appearance.   Cardiovascular:      Rate and Rhythm: Regular rhythm.      Heart sounds: Normal heart sounds.   Pulmonary:      Breath sounds: Normal breath sounds.   Musculoskeletal:      Cervical back: Normal range of motion.   Neurological:      Mental Status: He is alert.        Result Review :   The following data was reviewed by: SUSAN Farah on 08/18/2021:  Common labs    Common Labsle 5/10/21 5/10/21 5/10/21 5/10/21 5/11/21 5/11/21 7/21/21    [id number]    Glucose  296 (A)    293 (A)    BUN  56 (A)    51 (A)    Creatinine  2.33 (A)    1.97 (A) 2.30 (A)   eGFR Non  Am  28 (A)    34 (A)    Sodium  137    133 (A)    Potassium  5.3 (A)    5.0    Chloride  100    99    Calcium  8.8    8.5 (A)    Albumin      3.70    Total Bilirubin      0.3    Alkaline Phosphatase      73    AST (SGOT)      14    ALT (SGPT)      11    WBC 6.33    " 5.62     Hemoglobin 11.2 (A)    10.7 (A)     Hematocrit 34.5 (A)    32.0 (A)     Platelets 233    225     Hemoglobin A1C   9.00 (A)       Microalbumin, Urine    10.7      (A) Abnormal value       Comments are available for some flowsheets but are not being displayed.                     Assessment and Plan    Diagnoses and all orders for this visit:    1. Type 2 diabetes mellitus with hyperglycemia, with long-term current use of insulin (CMS/[facility]) (Primary)    2. Diabetic polyneuropathy associated with type 2 diabetes mellitus (CMS/[facility])    3. Vitamin D deficiency    4. Mixed hyperlipidemia    Other orders  -     Dulaglutide 1.5 MG/0.5ML solution pen-injector; Inject 1.5 mg under the skin into the appropriate area as directed 1 (One) Time Per Week.  Dispense: 2 mL; Refill: 11  -     Insulin Regular Human, Conc, (HumuLIN R U-500 KwikPen) 500 UNIT/ML solution pen-injector CONCENTRATED injection; Inject 120 units under the skin into the appropriate area as directed Every Morning with Breakfast  units Every Evening with Supper.  Dispense: 18 mL; Refill: 11             Glycemic management:      Diabetes mellitus type 2  Lab Results   Component Value Date    HGBA1C 9.00 (H) 05/10/2021              Dirk 2 personal use --no longer covered                    Humulin U 500             taking 100 units 30 minutes before breakfast          take 100 units 30 minutes before supper - decrease to 90 units      If you have a low bg in the night decrease to 65    Gives 20 units for lunch!               Trulicity 0.75 mg once weekly-increase to 1.5 mg weekly       Side effects can be nausea     If nauseated while eating, eat less,      If vomiting or abdominal pain stop medication and call office            Afreeza not affordable      Approve dexcom         The patient has diabetes mellitus, insulin-dependent.     Our Diabetes Department has evaluated the patient in the last six months and will continue counseling on insulin  adjustment.      The patient performs blood glucose testing at least four times daily with proven glucose variability from 50 to 300 mg per dl.     The patient is administering basal insulin and prandial insulin four times per day for more than six months.     The patient uses a home blood glucose monitor to assess blood glucose at least four times daily for more than six months.     The patient requires frequent adjustment of insulin treatment regimen based on blood glucose readings.     The patient has frequent variability in blood glucose readings due to activity and variability in meal content and time.      The patient has completed a diabetes education program with us.     The patient has demonstrated the ability to self-monitor her glucose.      The patient is motivated in improving diabetes control      The patient has hypoglycemia unawareness                   Lipid management       Hyperlipidemia      Crestor 40 mg one at night            Blood pressure management:     Hypertension      Taking bumex 1mg         Microvascular complications     Neuropathy     Taking gabapentin 300 mg tid          Last eye exam -- 2020,       Cataract surgery         Weight management       Obesity     Patient's Body mass index is 46.18 kg/m². indicating that he is obese (BMI >30). Obesity-related health conditions include the following: diabetes mellitus. Obesity is unchanged. BMI is is above average; no BMI management plan is appropriate. We discussed portion control and increasing exercise..                        Follow Up   Return in about 3 months (around 11/18/2021) for Recheck.  Patient was given instructions and counseling regarding his condition or for health maintenance advice. Please see specific information pulled into the AVS if appropriate.         This document has been electronically signed by SUSAN Farah on August 18, 2021 09:59 CDT.

## 2021-08-23 ENCOUNTER — TREATMENT (OUTPATIENT)
Dept: PHYSICAL THERAPY | Facility: CLINIC | Age: 66
End: 2021-08-23

## 2021-08-23 ENCOUNTER — HOSPITAL ENCOUNTER (EMERGENCY)
Facility: HOSPITAL | Age: 66
Discharge: HOME | End: 2021-08-24
Admitting: EMERGENCY MEDICINE

## 2021-08-23 DIAGNOSIS — N39.0 URINARY TRACT INFECTION WITHOUT HEMATURIA, SITE UNSPECIFIED: ICD-10-CM

## 2021-08-23 DIAGNOSIS — M54.12 RADICULOPATHY, CERVICAL: Primary | ICD-10-CM

## 2021-08-23 DIAGNOSIS — G89.29 CHRONIC NECK PAIN: Primary | ICD-10-CM

## 2021-08-23 DIAGNOSIS — M54.2 PAIN, NECK: ICD-10-CM

## 2021-08-23 DIAGNOSIS — M54.2 CHRONIC NECK PAIN: Primary | ICD-10-CM

## 2021-08-23 LAB
ALBUMIN SERPL-MCNC: 4.1 G/DL (ref 3.5–5.2)
ALBUMIN/GLOB SERPL: 1.5 G/DL
ALP SERPL-CCNC: 69 U/L (ref 39–117)
ALT SERPL W P-5'-P-CCNC: 15 U/L (ref 1–41)
ANION GAP SERPL CALCULATED.3IONS-SCNC: 11 MMOL/L (ref 5–15)
AST SERPL-CCNC: 16 U/L (ref 1–40)
BACTERIA UR QL AUTO: ABNORMAL /HPF
BASOPHILS # BLD AUTO: 0.05 10*3/MM3 (ref 0–0.2)
BASOPHILS NFR BLD AUTO: 0.5 % (ref 0–1.5)
BILIRUB SERPL-MCNC: 0.2 MG/DL (ref 0–1.2)
BILIRUB UR QL STRIP: NEGATIVE
BUN SERPL-MCNC: 45 MG/DL (ref 8–23)
BUN/CREAT SERPL: 19.1 (ref 7–25)
CALCIUM SPEC-SCNC: 9.1 MG/DL (ref 8.6–10.5)
CHLORIDE SERPL-SCNC: 104 MMOL/L (ref 98–107)
CLARITY UR: CLEAR
CO2 SERPL-SCNC: 22 MMOL/L (ref 22–29)
COLOR UR: YELLOW
CREAT SERPL-MCNC: 2.36 MG/DL (ref 0.76–1.27)
DEPRECATED RDW RBC AUTO: 44.6 FL (ref 37–54)
EOSINOPHIL # BLD AUTO: 0.64 10*3/MM3 (ref 0–0.4)
EOSINOPHIL NFR BLD AUTO: 6.2 % (ref 0.3–6.2)
ERYTHROCYTE [DISTWIDTH] IN BLOOD BY AUTOMATED COUNT: 13.8 % (ref 12.3–15.4)
GFR SERPL CREATININE-BSD FRML MDRD: 28 ML/MIN/1.73
GLOBULIN UR ELPH-MCNC: 2.7 GM/DL
GLUCOSE SERPL-MCNC: 142 MG/DL (ref 65–99)
GLUCOSE UR STRIP-MCNC: ABNORMAL MG/DL
HCT VFR BLD AUTO: 32.3 % (ref 37.5–51)
HGB BLD-MCNC: 11 G/DL (ref 13–17.7)
HGB UR QL STRIP.AUTO: NEGATIVE
HYALINE CASTS UR QL AUTO: ABNORMAL /LPF
IMM GRANULOCYTES # BLD AUTO: 0.04 10*3/MM3 (ref 0–0.05)
IMM GRANULOCYTES NFR BLD AUTO: 0.4 % (ref 0–0.5)
KETONES UR QL STRIP: NEGATIVE
LEUKOCYTE ESTERASE UR QL STRIP.AUTO: ABNORMAL
LYMPHOCYTES # BLD AUTO: 2.52 10*3/MM3 (ref 0.7–3.1)
LYMPHOCYTES NFR BLD AUTO: 24.4 % (ref 19.6–45.3)
MCH RBC QN AUTO: 30 PG (ref 26.6–33)
MCHC RBC AUTO-ENTMCNC: 34.1 G/DL (ref 31.5–35.7)
MCV RBC AUTO: 88 FL (ref 79–97)
MONOCYTES # BLD AUTO: 1.09 10*3/MM3 (ref 0.1–0.9)
MONOCYTES NFR BLD AUTO: 10.5 % (ref 5–12)
NEUTROPHILS NFR BLD AUTO: 58 % (ref 42.7–76)
NEUTROPHILS NFR BLD AUTO: 6 10*3/MM3 (ref 1.7–7)
NITRITE UR QL STRIP: NEGATIVE
NRBC BLD AUTO-RTO: 0 /100 WBC (ref 0–0.2)
PH UR STRIP.AUTO: 6.5 [PH] (ref 5–8)
PLATELET # BLD AUTO: 229 10*3/MM3 (ref 140–450)
PMV BLD AUTO: 10.4 FL (ref 6–12)
POTASSIUM SERPL-SCNC: 5.4 MMOL/L (ref 3.5–5.2)
PROT SERPL-MCNC: 6.8 G/DL (ref 6–8.5)
PROT UR QL STRIP: ABNORMAL
RBC # BLD AUTO: 3.67 10*6/MM3 (ref 4.14–5.8)
RBC # UR: ABNORMAL /HPF
REF LAB TEST METHOD: ABNORMAL
SODIUM SERPL-SCNC: 137 MMOL/L (ref 136–145)
SP GR UR STRIP: 1.02 (ref 1–1.03)
SQUAMOUS #/AREA URNS HPF: ABNORMAL /HPF
UROBILINOGEN UR QL STRIP: ABNORMAL
WBC # BLD AUTO: 10.34 10*3/MM3 (ref 3.4–10.8)
WBC UR QL AUTO: ABNORMAL /HPF

## 2021-08-23 PROCEDURE — 97140 MANUAL THERAPY 1/> REGIONS: CPT | Performed by: PHYSICAL THERAPIST

## 2021-08-23 PROCEDURE — 25010000003 HYDROMORPHONE 1 MG/ML SOLUTION: Performed by: NURSE PRACTITIONER

## 2021-08-23 PROCEDURE — 96375 TX/PRO/DX INJ NEW DRUG ADDON: CPT

## 2021-08-23 PROCEDURE — 87077 CULTURE AEROBIC IDENTIFY: CPT | Performed by: NURSE PRACTITIONER

## 2021-08-23 PROCEDURE — 81001 URINALYSIS AUTO W/SCOPE: CPT | Performed by: NURSE PRACTITIONER

## 2021-08-23 PROCEDURE — 96374 THER/PROPH/DIAG INJ IV PUSH: CPT

## 2021-08-23 PROCEDURE — 80053 COMPREHEN METABOLIC PANEL: CPT | Performed by: NURSE PRACTITIONER

## 2021-08-23 PROCEDURE — 25010000002 ONDANSETRON PER 1 MG: Performed by: NURSE PRACTITIONER

## 2021-08-23 PROCEDURE — 97163 PT EVAL HIGH COMPLEX 45 MIN: CPT | Performed by: PHYSICAL THERAPIST

## 2021-08-23 PROCEDURE — 87186 SC STD MICRODIL/AGAR DIL: CPT | Performed by: NURSE PRACTITIONER

## 2021-08-23 PROCEDURE — 99283 EMERGENCY DEPT VISIT LOW MDM: CPT

## 2021-08-23 PROCEDURE — 85025 COMPLETE CBC W/AUTO DIFF WBC: CPT | Performed by: NURSE PRACTITIONER

## 2021-08-23 PROCEDURE — 36415 COLL VENOUS BLD VENIPUNCTURE: CPT

## 2021-08-23 PROCEDURE — 87086 URINE CULTURE/COLONY COUNT: CPT | Performed by: NURSE PRACTITIONER

## 2021-08-23 RX ORDER — SODIUM CHLORIDE 0.9 % (FLUSH) 0.9 %
10 SYRINGE (ML) INJECTION AS NEEDED
Status: DISCONTINUED | OUTPATIENT
Start: 2021-08-23 | End: 2021-08-24 | Stop reason: HOSPADM

## 2021-08-23 RX ORDER — ONDANSETRON 2 MG/ML
4 INJECTION INTRAMUSCULAR; INTRAVENOUS ONCE
Status: COMPLETED | OUTPATIENT
Start: 2021-08-23 | End: 2021-08-23

## 2021-08-23 RX ADMIN — ONDANSETRON 4 MG: 2 INJECTION INTRAMUSCULAR; INTRAVENOUS at 22:52

## 2021-08-23 RX ADMIN — HYDROMORPHONE HYDROCHLORIDE 1 MG: 1 INJECTION, SOLUTION INTRAMUSCULAR; INTRAVENOUS; SUBCUTANEOUS at 22:52

## 2021-08-23 NOTE — PROGRESS NOTES
Physical Therapy Therapy Initial Evaluation and Plan of Care    Patient: Erick Luong               : 1955  Visit Date: 2021  Referring practitioner: SUSAN Monroy  Date of Initial Visit: 2021  Patient seen for 1 sessions    Visit Diagnoses:    ICD-10-CM ICD-9-CM   1. Radiculopathy, cervical  M54.12 723.4   2. Pain, neck  M54.2 723.1     Past Medical History:   Diagnosis Date   • Arthritis    • Autonomic disease    • CAD (coronary artery disease) 2017   • Coronary artery disease    • Diabetes mellitus (CMS/HCC)    • Gastroesophageal reflux disease 2019   • HTN (hypertension), benign 5/3/2017   • Hyperlipidemia    • Hypertension    • Mixed hyperlipidemia 2017   • Multiple lung nodules 2020    5mm, 9 mm RLL identified 2020, not present 10/2019.   • Myocardial infarction (CMS/HCC)    • Osteomyelitis (CMS/HCC) 2020   • Osteomyelitis of fifth toe of right foot (CMS/HCC) 10/7/2019   • Pancreatitis    • Persistent insomnia 2020   • Renal disorder    • Sleep apnea 2017   • Sleep apnea with use of continuous positive airway pressure (CPAP)    • Slow transit constipation 2019     Past Surgical History:   Procedure Laterality Date   • ABDOMINAL SURGERY     • APPENDECTOMY     • BACK SURGERY     • CARDIAC CATHETERIZATION Left 2021    Procedure: Left Heart Cath w poss intervention left anatomical snuff box acess;  Surgeon: Omkar Charles DO;  Location: [facility] CATH INVASIVE LOCATION;  Service: Cardiology;  Laterality: Left;   • CARDIAC SURGERY     • CATARACT EXTRACTION     • CERVICAL SPINE SURGERY     • COLONOSCOPY N/A 2017    Normal exam repeat in 5 years   • COLONOSCOPY N/A 2019    Mild acute inflammation   • COLONOSCOPY W/ POLYPECTOMY  2014    Hyperplastic polyp   • CORONARY ARTERY BYPASS GRAFT  10/2015   • ENDOSCOPY  2011    Gastritis with hemorrhage   • ENDOSCOPY N/A 2017    Normal exam   • ENDOSCOPY N/A 2019     Gastritis   • ENDOSCOPY N/A 2020    Non-erosive gastritis with hemorrhage   • ENDOSCOPY N/A 2/10/2021    Procedure: ESOPHAGOGASTRODUODENOSCOPY WITH ANESTHESIA;  Surgeon: Cristopher Hannah MD;  Location: [facility] ENDOSCOPY;  Service: Gastroenterology;  Laterality: N/A;  preop; chest pain  postop; esophagitis;   PCP Del Shetty    • INCISION AND DRAINAGE OF WOUND Left 2007    spider bite         SUBJECTIVE     Subjective Evaluation    History of Present Illness  Mechanism of injury: His c/c is his neck pain. This flared up about a month ago with no specific etiology. He has been seen in PT before for this    Pain  Current pain ratin  Location: tang neck and arms  Quality: burning, dull ache, sharp and radiating  Relieving factors: change in position  Exacerbated by: lying supine, turning head.  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   Muscle spasm in the upper trapezius.   Tenderness of the cervical paraspinals, suboccipitals and upper trapezius.     Right   Hypertonic in the cervical paraspinals, suboccipitals and upper trapezius.   Muscle spasm in the upper trapezius. Tenderness of the cervical paraspinals, suboccipitals and upper trapezius.     Neurological Testing     Sensation   Cervical/Thoracic   Left   Diminished: light touch    Right   Diminished: light touch    Comments   Left light touch: C6,7,8.   Right light touch: C6,7,8.     Reflexes   Left   Biceps (C5/C6): trace (1+)  Brachioradialis (C6): trace (1+)  Triceps (C7): trace (1+)    Right   Deltoid (C5): trace (1+)  Biceps (C5/C6): trace (1+)  Brachioradialis (C6): trace (1+)  Triceps (C7): trace (1+)    Strength/Myotome Testing     Left  Shoulder     Planes of Motion   Flexion: 4-     Right Shoulder     Planes of Motion   Flexion: 4-     Left Elbow   Flexion: 4-  Extension: 4+    Right Elbow   Flexion: 4  Extension: 4+    Left Wrist/Hand   Wrist extension: 4  Wrist flexion: 4+     (2nd hand position)   lbs: 20    Right Wrist/Hand   Wrist extension: 4+  Wrist flexion: 4+     (2nd hand position)   lbs: 75    Additional Strength Details  Able to hold tone of PF up to 5 secs before fatigue.    Tests   Cervical     Left   Positive active compression ([place]), cervical distraction and Spurling's sign.     Right   Positive active compression ([place]), cervical distraction and Spurling's sign.       Manual Therapy     50108  Comments   Supine tang CT extension mobs Gr 3 sustained   Supine manual cervical traction Gave some relief               Timed Minutes 25          Therapy Education/Self Care 86952   Details: Posture  Call MD or go to ED if symptoms worsen  Work on Kegel exs for bladder control  Spoke with wife, Joan and updated her on these and advised he might try to sleep in a recliner with a neck pillow and arms supported.    Given Home Exercise Program  postural retraining  symptom relief   Progress: New   Education provided to:  Patient and Spouse/SO   Level of understanding Verbalized   Timed Minutes          Total Timed Treatment:     25   mins  Total Time of Visit:            60   mins    ASSESSMENT/PLAN     GOALS:  Goals                                                    Progress Note due by 9/22/21                                                                Recert due by 11/21/21   STG by: 3 weeks Comments Date Status   Improve mobility through thoracic and CT junction       Decreased muscle guarding  throughout neck and shoulder girdle        Reports no leaking urine for a week               LTG by: 6 weeks       Reports no radicular symptoms for a week       Improve cervical rotation tang to 60 with no pain      Report no  neck/shoulder pain except occasional minor twinges no more than 2-3/10      Understands improved ergonomics for work and HEP for flexibility and posture       Improve left  strength on 2nd slot to 50#                     Assessment & Plan     Assessment  Impairments: abnormal muscle firing, abnormal muscle tone, abnormal or restricted ROM, activity intolerance, impaired physical strength, lacks appropriate home exercise program and pain with function  Assessment details: I'm concerned with what I'm seeing with Erick. Today, his primary problem appears to be his tang radicular pain with weakness, particularly on the left (right  75#, left  20#). His radicular pain and weakness is along his tang C6 dermatomes and myotomes. He is also having spasms of his tang neck. His forward head posture and rounded shoulders tend to magnify this. A worrisome new factor is him saying that over the last couple of weeks, he has started to lose bladder control. He says it's not constant but he will be driving and just start leaking. He does have back issues with some radicular symptoms so it's hard to tell if it may be from SC compression vs lumbar radicular problems. He isn't showing spasticity at this point but does c/o his legs being weaker and he struggles to stand from a chair.   I hope that we can help him get some relief but I wouldn't be surprised if he ultimately needs less conservative measures. I advised him on s/s of deterioration of his problems including increased weakness, worsening bladder control, etc and told him to call the MD or go to the ED if he starts having worsening problems.   Prognosis: fair  Functional Limitations: sleeping, uncomfortable because of pain, moving in bed, sitting and unable to perform repetitive tasks  Plan  Therapy options: will be seen for skilled physical therapy services  Planned modality interventions: dry needling, low level laser therapy, TENS and traction  Planned therapy  interventions: manual therapy, neuromuscular re-education, spinal/joint mobilization, home exercise program, body mechanics training, stretching, therapeutic activities, strengthening, soft tissue mobilization and postural training  Frequency: 3x week  Duration in visits: 12  Treatment plan discussed with: patient  Plan details: Focus early on pain relief with soft tissue work and other modalities, including potentially dry needling. Manual therapy used for mobilizations of the spine and upper thoracic and flexibility through the upper girdle. Progress with stability for posture and the shoulder girdle and progress HEP for the same.         PT SIGNATURE: Rigo Escudero, PT       Initial Certification  Certification Period: 8/23/2021 through 11/21/2021  I certify that the therapy services are furnished while this patient is under my care.  The services outlined above are required by this patient, and will be reviewed every 90 days.     PHYSICIAN:     Willy Romero APRN______________________________________DATE: _________     Please sign and return via fax to [phone].   Thank you so much for letting us work with Erick. I appreciate your letting us work with your patients. If you have any questions or concerns, please don't hesitate to contact me.

## 2021-08-24 VITALS
HEART RATE: 85 BPM | HEIGHT: 72 IN | OXYGEN SATURATION: 98 % | DIASTOLIC BLOOD PRESSURE: 85 MMHG | BODY MASS INDEX: 42.66 KG/M2 | WEIGHT: 315 LBS | RESPIRATION RATE: 20 BRPM | SYSTOLIC BLOOD PRESSURE: 132 MMHG | TEMPERATURE: 98.3 F

## 2021-08-24 PROCEDURE — 96372 THER/PROPH/DIAG INJ SC/IM: CPT

## 2021-08-24 PROCEDURE — 25010000003 HYDROMORPHONE 1 MG/ML SOLUTION: Performed by: NURSE PRACTITIONER

## 2021-08-24 RX ORDER — CEFDINIR 300 MG/1
300 CAPSULE ORAL 2 TIMES DAILY
Qty: 14 CAPSULE | Refills: 0 | Status: SHIPPED | OUTPATIENT
Start: 2021-08-24 | End: 2021-08-31

## 2021-08-24 RX ORDER — PREDNISONE 20 MG/1
20 TABLET ORAL 2 TIMES DAILY
Qty: 6 TABLET | Refills: 0 | Status: SHIPPED | OUTPATIENT
Start: 2021-08-24 | End: 2021-08-27

## 2021-08-24 RX ADMIN — HYDROMORPHONE HYDROCHLORIDE 1 MG: 1 INJECTION, SOLUTION INTRAMUSCULAR; INTRAVENOUS; SUBCUTANEOUS at 01:21

## 2021-08-24 NOTE — ED NOTES
Unsuccessful attempt to to start IV and collect labs x 2 per Solo LEAL.  Neelam DAVIS aware.  Ok to give meds IM     Reuben Aparicio RN  08/23/21 6251
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
## 2021-08-24 NOTE — DISCHARGE INSTRUCTIONS
Closely monitor blood sugars while taking steroids; continue pain medication as previously prescribed; f/u with Dr. Soliz's office tomorrow with continued sxs

## 2021-08-25 LAB — BACTERIA SPEC AEROBE CULT: ABNORMAL

## 2021-08-27 ENCOUNTER — TELEPHONE (OUTPATIENT)
Dept: NEUROSURGERY | Facility: CLINIC | Age: 66
End: 2021-08-27

## 2021-08-27 NOTE — TELEPHONE ENCOUNTER
Attempted to call patient but no answer. I did discuss this patient with Dr. Soliz and if the patient can continue with physical therapy it is felt that he does need to try and completed otherwise he can just keep his appointment with Dr. Soliz on September 16

## 2021-08-30 ENCOUNTER — TREATMENT (OUTPATIENT)
Dept: PHYSICAL THERAPY | Facility: CLINIC | Age: 66
End: 2021-08-30

## 2021-08-30 DIAGNOSIS — M54.12 RADICULOPATHY, CERVICAL: Primary | ICD-10-CM

## 2021-08-30 DIAGNOSIS — M54.2 PAIN, NECK: ICD-10-CM

## 2021-08-30 PROCEDURE — 97140 MANUAL THERAPY 1/> REGIONS: CPT | Performed by: PHYSICAL THERAPIST

## 2021-08-30 NOTE — PROGRESS NOTES
Physical Therapy Treatment Note    Patient: Erick Luong                                                                                     Visit Date: 2021  :     1955    Referring practitioner:    SUSAN Monroy  Date of Initial Visit:          Type: THERAPY  Noted: 2021    Patient seen for 2 sessions    Visit Diagnoses:    ICD-10-CM ICD-9-CM   1. Radiculopathy, cervical  M54.12 723.4   2. Pain, neck  M54.2 723.1     SUBJECTIVE     Subjective He says his pain is still very severe with tang arm radicular pain. He went  To the ER for pain control Friday.    PAIN: 8/10-->7/10         OBJECTIVE     Objective      Manual Therapy     26826  Comments   Treatment done seated with tang arms supported on 2 pillows    tang UT/LS STM    tang subocc STM and PA mob of C1 tang Sustained gr 3 with head stabilized   CT junction extension mobs    Thoracic ext stretch over horizontal and vertical foam roll with sustained OP at shoulders                        Timed Minutes 40          Therapy Education/Self Care 14462   Details: Posture  Shoulder blade squeezes  Call MD or go to ED if symptoms worsen  Work on hiogi for bladder control  Spoke with wife, Joan and updated her on these and advised he might try to sleep in a recliner with a neck pillow and arms supported.    Given Home Exercise Program  postural retraining  symptom relief   Progress: New   Education provided to:  Patient and Spouse/SO   Level of understanding Verbalized   Timed Minutes          Total Timed Treatment:     40   mins  Total Time of Visit:            45   mins    ASSESSMENT/PLAN     GOALS:  Goals                                                    Progress Note due by 21                                                                Recert due by 21   STG by: 3 weeks Comments Date Status   Improve mobility through thoracic and CT junction       Decreased muscle guarding  throughout neck and shoulder girdle  very  guarded and tender 8/30 ongoing    Reports no leaking urine for a week               LTG by: 6 weeks       Reports no radicular symptoms for a week  tang radicular constant 8/30 ongoing   Improve cervical rotation tang to 60 with no pain      Report no neck/shoulder pain except occasional minor twinges no more than 2-3/10      Understands improved ergonomics for work and HEP for flexibility and posture       Improve left  strength on 2nd slot to 50#                       Assessment/Plan     ASSESSMENT: Today was his first visit after his eval. No goals met today. He is still in quite a bit of pain with radicular symptoms. He said his pain was slightly less after but his balance was diminished walking out.     PLAN: cont to work on mobility of his neck to try to relieve pain out of his arms.     Signature: Rigo Escudero, PT

## 2021-09-09 ENCOUNTER — TREATMENT (OUTPATIENT)
Dept: PHYSICAL THERAPY | Facility: CLINIC | Age: 66
End: 2021-09-09

## 2021-09-09 DIAGNOSIS — M54.2 PAIN, NECK: ICD-10-CM

## 2021-09-09 DIAGNOSIS — M54.12 RADICULOPATHY, CERVICAL: Primary | ICD-10-CM

## 2021-09-09 PROCEDURE — 97140 MANUAL THERAPY 1/> REGIONS: CPT | Performed by: PHYSICAL THERAPIST

## 2021-09-09 NOTE — PROGRESS NOTES
Physical Therapy Treatment Note    Patient: Erick Luong                                                                                     Visit Date: 2021  :     1955    Referring practitioner:    SUSAN Monroy  Date of Initial Visit:          Type: THERAPY  Noted: 2021    Patient seen for 3 sessions    Visit Diagnoses:    ICD-10-CM ICD-9-CM   1. Radiculopathy, cervical  M54.12 723.4   2. Pain, neck  M54.2 723.1     SUBJECTIVE     Subjective He says his pain is slightly better but he still struggles with constant pain and left arm radicular pain making him struggling using it. He denies any falling. He denies any change in his b/b.     PAIN: 6/10         OBJECTIVE     Objective      Manual Therapy     [id number]  Comments   Treatment done seated with tang arms supported on 2 pillows    tang UT/LS STM    tang subocc STM and PA mob of C1 tang Sustained gr 3 with head stabilized   CT junction extension mobs                            Timed Minutes 45          Therapy Education/Self Care [id number]   Details: Posture  Shoulder blade squeezes  Call MD or go to ED if symptoms worsen  Work on [employer] exs for bladder control  Spoke with wife, Joan and updated her on these and advised he might try to sleep in a recliner with a neck pillow and arms supported.    Given Home Exercise Program  postural retraining  symptom relief   Progress: New   Education provided to:  Patient and Spouse/SO   Level of understanding Verbalized   Timed Minutes          Total Timed Treatment:     40   mins  Total Time of Visit:            45   mins    ASSESSMENT/PLAN     GOALS:  Goals                                                    Progress Note due by 21                                                                Recert due by 21   STG by: 3 weeks Comments Date Status   Improve mobility through thoracic and CT junction       Decreased muscle guarding  throughout neck and shoulder girdle  very guarded and tender  8/30 ongoing    Reports no leaking urine for a week               LTG by: 6 weeks       Reports no radicular symptoms for a week  tang radicular constant 8/30 ongoing   Improve cervical rotation tang to 60 with no pain      Report no neck/shoulder pain except occasional minor twinges no more than 2-3/10      Understands improved ergonomics for work and HEP for flexibility and posture       Improve left  strength on 2nd slot to 50#                       Assessment/Plan     ASSESSMENT: He is still having quite a bit of pain with radicular symptoms down his arm. He missed several visits due to being sick.     PLAN: cont to work on mobility of his neck to try to relieve pain out of his arms.     Signature: Rigo Escudero, PT

## 2021-09-10 ENCOUNTER — TREATMENT (OUTPATIENT)
Dept: PHYSICAL THERAPY | Facility: CLINIC | Age: 66
End: 2021-09-10

## 2021-09-10 DIAGNOSIS — M54.2 PAIN, NECK: ICD-10-CM

## 2021-09-10 DIAGNOSIS — M54.12 RADICULOPATHY, CERVICAL: Primary | ICD-10-CM

## 2021-09-10 PROCEDURE — 97140 MANUAL THERAPY 1/> REGIONS: CPT | Performed by: PHYSICAL THERAPIST

## 2021-09-10 NOTE — PROGRESS NOTES
"Physical Therapy Treatment Note    Patient: Erick Luong                                                                                     Visit Date: 9/10/2021  :     1955    Referring practitioner:    SUSAN Monroy  Date of Initial Visit:          Type: THERAPY  Noted: 2021    Patient seen for 4 sessions    Visit Diagnoses:    ICD-10-CM ICD-9-CM   1. Radiculopathy, cervical  M54.12 723.4   2. Pain, neck  M54.2 723.1     SUBJECTIVE     Subjective He says his neck is hurting worse today. He is also having increased lower lumbar pain.     PAIN: 8/10         OBJECTIVE     Objective      Dry Needle Location [ (1-2 muscles)/ (3 or more muscles)]   Location Depth (\") Comment   tang accessory n 2 pistoning with LTR on right   tang C2-7 post cut 2    tang inf cerv oblique 1    tang deep radial  1    tang supf radial 1                     TRIAL done while sitting       Manual Therapy     [id number]  Comments   Treatment done seated with tang arms supported on 2 pillows    tang UT/LS STM    tang subocc STM and PA mob of C1 tang Sustained gr 3 with head stabilized   CT junction extension mobs                            Timed Minutes 25          Therapy Education/Self Care [id number]   Details: Posture  Shoulder blade squeezes  Call MD or go to ED if symptoms worsen  Work on [employer] exs for bladder control  Spoke with wife, Joan and updated her on these and advised he might try to sleep in a recliner with a neck pillow and arms supported.    Given Home Exercise Program  postural retraining  symptom relief   Progress: New   Education provided to:  Patient and Spouse/SO   Level of understanding Verbalized   Timed Minutes          Total Timed Treatment:     25   mins  Total Time of Visit:            45   mins    ASSESSMENT/PLAN     GOALS:  Goals                                                    Progress Note due by 21                                                                Recert due by 21 "   STG by: 3 weeks Comments Date Status   Improve mobility through thoracic and CT junction       Decreased muscle guarding  throughout neck and shoulder girdle  very guarded and tender 9/10 ongoing    Reports no leaking urine for a week               LTG by: 6 weeks       Reports no radicular symptoms for a week  tang radicular constant 8/30 ongoing   Improve cervical rotation tang to 60 with no pain      Report no neck/shoulder pain except occasional minor twinges no more than 2-3/10 8/10 9/10 ongoing   Understands improved ergonomics for work and HEP for flexibility and posture       Improve left  strength on 2nd slot to 50#                       Assessment/Plan     ASSESSMENT: He is still struggling with pain. I tried dry needling today to see if this ight relax his spasms enough. He might need pain management to help control his pain. His left hand did feel a bit better after today which is somewhat encouraging.     PLAN: cont to work on mobility of his neck to try to relieve pain out of his arms. Contact the MD office to let them know he's still struggling.     Signature: Rigo Escudero, PT

## 2021-09-14 ENCOUNTER — TELEPHONE (OUTPATIENT)
Dept: NEUROSURGERY | Facility: CLINIC | Age: 66
End: 2021-09-14

## 2021-09-15 NOTE — TELEPHONE ENCOUNTER
Patient has not called back so I tried to reach him again but no answer, so I left another voicemail.    I called the patient's wife and left a voicemail for her to call me back as well.    evy fraser CMA

## 2021-09-15 NOTE — TELEPHONE ENCOUNTER
I spoke w/patient's wife and she is going to try to get in touch with him and thinks he will want to come tomorrow @ 9 am but will let me know for sure.    evy fraser CMA

## 2021-09-15 NOTE — TELEPHONE ENCOUNTER
Left voicemail for patient to call me back to see if he can come earlier in the day on Thursday    evy fraser CMA

## 2021-09-16 ENCOUNTER — OFFICE VISIT (OUTPATIENT)
Dept: NEUROSURGERY | Facility: CLINIC | Age: 66
End: 2021-09-16

## 2021-09-16 VITALS — BODY MASS INDEX: 42.66 KG/M2 | WEIGHT: 315 LBS | HEIGHT: 72 IN

## 2021-09-16 DIAGNOSIS — E66.01 CLASS 3 SEVERE OBESITY DUE TO EXCESS CALORIES WITH SERIOUS COMORBIDITY AND BODY MASS INDEX (BMI) OF 45.0 TO 49.9 IN ADULT (HCC): ICD-10-CM

## 2021-09-16 DIAGNOSIS — M54.12 CERVICAL RADICULOPATHY: ICD-10-CM

## 2021-09-16 DIAGNOSIS — M48.02 SPINAL STENOSIS IN CERVICAL REGION: Primary | ICD-10-CM

## 2021-09-16 DIAGNOSIS — Z78.9 NON-SMOKER: ICD-10-CM

## 2021-09-16 DIAGNOSIS — R26.9 ABNORMALITY OF GAIT: ICD-10-CM

## 2021-09-16 DIAGNOSIS — M50.30 DEGENERATION OF CERVICAL INTERVERTEBRAL DISC: ICD-10-CM

## 2021-09-16 PROCEDURE — 99214 OFFICE O/P EST MOD 30 MIN: CPT | Performed by: NEUROLOGICAL SURGERY

## 2021-09-16 NOTE — PATIENT INSTRUCTIONS
"PATIENT TO CONTINUE TO FOLLOW UP WITH HIS PRIMARY CARE PROVIDER FOR YEARLY PHYSICAL EXAMS TO ENSURE COMPLETE HEALTH MAINTENANCE      BMI for Adults  What is BMI?  Body mass index (BMI) is a number that is calculated from a person's weight and height. BMI can help estimate how much of a person's weight is composed of fat. BMI does not measure body fat directly. Rather, it is an alternative to procedures that directly measure body fat, which can be difficult and expensive.  BMI can help identify people who may be at higher risk for certain medical problems.  What are BMI measurements used for?  BMI is used as a screening tool to identify possible weight problems. It helps determine whether a person is obese, overweight, a healthy weight, or underweight.  BMI is useful for:  · Identifying a weight problem that may be related to a medical condition or may increase the risk for medical problems.  · Promoting changes, such as changes in diet and exercise, to help reach a healthy weight. BMI screening can be repeated to see if these changes are working.  How is BMI calculated?  BMI involves measuring your weight in relation to your height. Both height and weight are measured, and the BMI is calculated from those numbers. This can be done either in English ([place] or metric measurements. Note that charts and online BMI calculators are available to help you find your BMI quickly and easily without having to do these calculations yourself.  To calculate your BMI in English ([place] measurements:    1. Measure your weight in pounds (lb).  2. Multiply the number of pounds by 703.  ? For example, for a person who weighs 180 lb, multiply that number by 703, which equals 126,540.  3. Measure your height in inches. Then multiply that number by itself to get a measurement called \"inches squared.\"  ? For example, for a person who is 70 inches tall, the \"inches squared\" measurement is 70 inches x 70 inches, which equals 4,900 inches " "squared.  4. Divide the total from step 2 (number of lb x 703) by the total from step 3 (inches squared): 126,540 ÷ 4,900 = 25.8. This is your BMI.    To calculate your BMI in metric measurements:  1. Measure your weight in kilograms (kg).  2. Measure your height in meters (m). Then multiply that number by itself to get a measurement called \"meters squared.\"  ? For example, for a person who is 1.75 m tall, the \"meters squared\" measurement is 1.75 m x 1.75 m, which is equal to 3.1 meters squared.  3. Divide the number of kilograms (your weight) by the meters squared number. In this example: 70 ÷ 3.1 = 22.6. This is your BMI.  What do the results mean?  BMI charts are used to identify whether you are underweight, normal weight, overweight, or obese. The following guidelines will be used:  · Underweight: BMI less than 18.5.  · Normal weight: BMI between 18.5 and 24.9.  · Overweight: BMI between 25 and 29.9.  · Obese: BMI of 30 or above.  Keep these notes in mind:  · Weight includes both fat and muscle, so someone with a muscular build, such as an athlete, may have a BMI that is higher than 24.9. In cases like these, BMI is not an accurate measure of body fat.  · To determine if excess body fat is the cause of a BMI of 25 or higher, further assessments may need to be done by a health care provider.  · BMI is usually interpreted in the same way for men and women.  Where to find more information  For more information about BMI, including tools to quickly calculate your BMI, go to these websites:  · Centers for Disease Control and Prevention: www.cdc.gov  · American Heart Association: www.heart.org  · National Heart, Lung, and Blood [place]: www.nhlbi.nih.gov  Summary  · Body mass index (BMI) is a number that is calculated from a person's weight and height.  · BMI may help estimate how much of a person's weight is composed of fat. BMI can help identify those who may be at higher risk for certain medical problems.  · BMI " "can be measured using English measurements or metric measurements.  · BMI charts are used to identify whether you are underweight, normal weight, overweight, or obese.  This information is not intended to replace advice given to you by your health care provider. Make sure you discuss any questions you have with your health care provider.  Document Revised: 09/09/2020 Document Reviewed: 07/17/2020  [employer] Patient Education © 2021 [employer]      https://www.nhlbi.nih.gov/files/docs/public/heart/dash_brief.pdf\">   DASH Eating Plan  DASH stands for Dietary Approaches to Stop Hypertension. The DASH eating plan is a healthy eating plan that has been shown to:  · Reduce high blood pressure (hypertension).  · Reduce your risk for type 2 diabetes, heart disease, and stroke.  · Help with weight loss.  What are tips for following this plan?  Reading food labels  · Check food labels for the amount of salt (sodium) per serving. Choose foods with less than 5 percent of the Daily Value of sodium. Generally, foods with less than 300 milligrams (mg) of sodium per serving fit into this eating plan.  · To find whole grains, look for the word \"whole\" as the first word in the ingredient list.  Shopping  · Buy products labeled as \"low-sodium\" or \"no salt added.\"  · Buy fresh foods. Avoid canned foods and pre-made or frozen meals.  Cooking  · Avoid adding salt when cooking. Use salt-free seasonings or herbs instead of table salt or sea salt. Check with your health care provider or pharmacist before using salt substitutes.  · Do not vera foods. Cook foods using healthy methods such as baking, boiling, grilling, roasting, and broiling instead.  · Cook with heart-healthy oils, such as olive, canola, avocado, soybean, or sunflower oil.  Meal planning    · Eat a balanced diet that includes:  ? 4 or more servings of fruits and 4 or more servings of vegetables each day. Try to fill one-half of your plate with fruits and vegetables.  ? 6-8 " servings of whole grains each day.  ? Less than 6 oz (170 g) of lean meat, poultry, or fish each day. A 3-oz (85-g) serving of meat is about the same size as a deck of cards. One egg equals 1 oz (28 g).  ? 2-3 servings of low-fat dairy each day. One serving is 1 cup (237 mL).  ? 1 serving of nuts, seeds, or beans 5 times each week.  ? 2-3 servings of heart-healthy fats. Healthy fats called omega-3 fatty acids are found in foods such as walnuts, flaxseeds, fortified milks, and eggs. These fats are also found in cold-water fish, such as sardines, salmon, and mackerel.  · Limit how much you eat of:  ? Canned or prepackaged foods.  ? Food that is high in trans fat, such as some fried foods.  ? Food that is high in saturated fat, such as fatty meat.  ? Desserts and other sweets, sugary drinks, and other foods with added sugar.  ? Full-fat dairy products.  · Do not salt foods before eating.  · Do not eat more than 4 egg yolks a week.  · Try to eat at least 2 vegetarian meals a week.  · Eat more home-cooked food and less restaurant, buffet, and fast food.    Lifestyle  · When eating at a restaurant, ask that your food be prepared with less salt or no salt, if possible.  · If you drink alcohol:  ? Limit how much you use to:  § 0-1 drink a day for women who are not pregnant.  § 0-2 drinks a day for men.  ? Be aware of how much alcohol is in your drink. In the [place] one drink equals one 12 oz bottle of beer (355 mL), one 5 oz glass of wine (148 mL), or one 1½ oz glass of hard liquor (44 mL).  General information  · Avoid eating more than 2,300 mg of salt a day. If you have hypertension, you may need to reduce your sodium intake to 1,500 mg a day.  · Work with your health care provider to maintain a healthy body weight or to lose weight. Ask what an ideal weight is for you.  · Get at least 30 minutes of exercise that causes your heart to beat faster (aerobic exercise) most days of the week. Activities may include walking,  swimming, or biking.  · Work with your health care provider or dietitian to adjust your eating plan to your individual calorie needs.  What foods should I eat?  Fruits  All fresh, dried, or frozen fruit. Canned fruit in natural juice (without added sugar).  Vegetables  Fresh or frozen vegetables (raw, steamed, roasted, or grilled). Low-sodium or reduced-sodium tomato and vegetable juice. Low-sodium or reduced-sodium tomato sauce and tomato paste. Low-sodium or reduced-sodium canned vegetables.  Grains  Whole-grain or whole-wheat bread. Whole-grain or whole-wheat pasta. Brown rice. Oatmeal. Quinoa. Bulgur. Whole-grain and low-sodium cereals. Radha bread. Low-fat, low-sodium crackers. Whole-wheat flour tortillas.  Meats and other proteins  Skinless chicken or turkey. Ground chicken or turkey. Pork with fat trimmed off. Fish and seafood. Egg whites. Dried beans, peas, or lentils. Unsalted nuts, nut butters, and seeds. Unsalted canned beans. Lean cuts of beef with fat trimmed off. Low-sodium, lean precooked or cured meat, such as sausages or meat loaves.  Dairy  Low-fat (1%) or fat-free (skim) milk. Reduced-fat, low-fat, or fat-free cheeses. Nonfat, low-sodium ricotta or cottage cheese. Low-fat or nonfat yogurt. Low-fat, low-sodium cheese.  Fats and oils  Soft margarine without trans fats. Vegetable oil. Reduced-fat, low-fat, or light mayonnaise and salad dressings (reduced-sodium). Canola, safflower, olive, avocado, soybean, and sunflower oils. Avocado.  Seasonings and condiments  Herbs. Spices. Seasoning mixes without salt.  Other foods  Unsalted popcorn and pretzels. Fat-free sweets.  The items listed above may not be a complete list of foods and beverages you can eat. Contact a dietitian for more information.  What foods should I avoid?  Fruits  Canned fruit in a light or heavy syrup. Fried fruit. Fruit in cream or butter sauce.  Vegetables  Creamed or fried vegetables. Vegetables in a cheese sauce. Regular canned  vegetables (not low-sodium or reduced-sodium). Regular canned tomato sauce and paste (not low-sodium or reduced-sodium). Regular tomato and vegetable juice (not low-sodium or reduced-sodium). Pickles. Olives.  Grains  Baked goods made with fat, such as croissants, muffins, or some breads. Dry pasta or rice meal packs.  Meats and other proteins  Fatty cuts of meat. Ribs. Fried meat. Morfin. Bologna, salami, and other precooked or cured meats, such as sausages or meat loaves. Fat from the back of a pig (fatback). Bratwurst. Salted nuts and seeds. Canned beans with added salt. Canned or smoked fish. Whole eggs or egg yolks. Chicken or turkey with skin.  Dairy  Whole or 2% milk, cream, and half-and-half. Whole or full-fat cream cheese. Whole-fat or sweetened yogurt. Full-fat cheese. Nondairy creamers. Whipped toppings. Processed cheese and cheese spreads.  Fats and oils  Butter. Stick margarine. Lard. Shortening. Ghee. Morfin fat. Tropical oils, such as coconut, palm kernel, or palm oil.  Seasonings and condiments  Onion salt, garlic salt, seasoned salt, table salt, and sea salt. Worcestershire sauce. Tartar sauce. Barbecue sauce. Teriyaki sauce. Soy sauce, including reduced-sodium. Steak sauce. Canned and packaged gravies. Fish sauce. Oyster sauce. Cocktail sauce. Store-bought horseradish. Ketchup. Mustard. Meat flavorings and tenderizers. Bouillon cubes. Hot sauces. Pre-made or packaged marinades. Pre-made or packaged taco seasonings. Relishes. Regular salad dressings.  Other foods  Salted popcorn and pretzels.  The items listed above may not be a complete list of foods and beverages you should avoid. Contact a dietitian for more information.  Where to find more information  · National Heart, Lung, and Blood [place]: www.nhlbi.nih.gov  · American Heart Association: www.heart.org  · Academy of Nutrition and Dietetics: www.eatright.org  · National Kidney Foundation: www.kidney.org  Summary  · The DASH eating plan is a  healthy eating plan that has been shown to reduce high blood pressure (hypertension). It may also reduce your risk for type 2 diabetes, heart disease, and stroke.  · When on the DASH eating plan, aim to eat more fresh fruits and vegetables, whole grains, lean proteins, low-fat dairy, and heart-healthy fats.  · With the DASH eating plan, you should limit salt (sodium) intake to 2,300 mg a day. If you have hypertension, you may need to reduce your sodium intake to 1,500 mg a day.  · Work with your health care provider or dietitian to adjust your eating plan to your individual calorie needs.  This information is not intended to replace advice given to you by your health care provider. Make sure you discuss any questions you have with your health care provider.  Document Revised: 11/20/2020 Document Reviewed: 11/20/2020  [employer] Patient Education © 2021 [employer]

## 2021-09-16 NOTE — H&P
SUBJECTIVE:  Patient ID: Erick Luong is a 66 y.o. male is here today for follow-up.    Chief Complaint: Neck pain  Chief Complaint   Patient presents with   • NECK & ARM PAIN     patient is here today for follow up after completing 4 sessions of therapy w/o relief; patient did have MRI & xrays @ [facility]; patient also complains of N/T B) hands; he has a history of ACDF C6/7 2010 by Dr Diana.  Today patient states his symptoms are no better, he is now having B/B incontinence, and the pain in his neck goes down his spine and causes N/T in BUE and BLE.       HPI  66-year-old gentleman with a history of chronic neck pain and left greater than right upper extremity pain numbness and tingling.  He try to participate in a dedicated course of physical therapy.  The was only  able to do that in a limited fashion.  He also underwent some dry needling.  He did not find this helpful.  He had neck surgery by Dr. Diana in 2010.  He has had chronic neck pain since that surgery.  He is very clear though that it has been getting worse.  He does feel that his walking is getting worse.  In the radiculopathy is getting worse.  He takes Percocet 3 times a day for a variety of pain issues.  He has had no injection treatments in his neck.    The following portions of the patient's history were reviewed and updated as appropriate: allergies, current medications, past family history, past medical history, past social history, past surgical history and problem list.    OBJECTIVE:    Review of Systems   Musculoskeletal: Positive for arthralgias, back pain, gait problem and neck pain.   Neurological: Positive for numbness.   All other systems reviewed and are negative.         Physical Exam  Eyes:      Extraocular Movements: EOM normal.      Pupils: Pupils are equal, round, and reactive to light.   Neurological:      Mental Status: He is oriented to person, place, and time.      Coordination: Finger-Nose-Finger Test and Romberg Test normal.       Gait: Tandem walk abnormal.      Deep Tendon Reflexes: Strength normal.      Reflex Scores:       Tricep reflexes are 2+ on the right side and 2+ on the left side.       Bicep reflexes are 0 on the right side and 0 on the left side.       Brachioradialis reflexes are 0 on the right side and 0 on the left side.       Patellar reflexes are 0 on the right side and 0 on the left side.  Psychiatric:         Speech: Speech normal.         Neurologic Exam     Mental Status   Oriented to person, place, and time.   Attention: normal.   Speech: speech is normal   Level of consciousness: alert  Knowledge: good.     Cranial Nerves     CN II   Visual fields full to confrontation.     CN III, IV, VI   Pupils are equal, round, and reactive to light.  Extraocular motions are normal.     CN V   Facial sensation intact.     CN VII   Facial expression full, symmetric.     CN VIII   CN VIII normal.     CN IX, X   CN IX normal.   CN X normal.     CN XI   CN XI normal.     CN XII   CN XII normal.     Motor Exam   Muscle bulk: normal  Overall muscle tone: normal  Right arm pronator drift: absent  Left arm pronator drift: absent    Strength   Strength 5/5 throughout.     Sensory Exam   Light touch normal.   Pinprick normal.     Gait, Coordination, and Reflexes     Gait  Gait: (Mildly unsteady gait.  Unable to tandem walk.)    Coordination   Romberg: negative  Finger to nose coordination: normal  Tandem walking coordination: abnormal    Tremor   Resting tremor: absent  Intention tremor: absent  Action tremor: absent    Reflexes   Reflexes 2+ except as noted.   Right brachioradialis: 0  Left brachioradialis: 0  Right biceps: 0  Left biceps: 0  Right triceps: 2+  Left triceps: 2+  Right patellar: 0  Left patellar: 0  Right Stack: absent  Left Stack: absent  Right ankle clonus: absent  Left pendular knee jerk: absent      Independent Review of Radiographic Studies:       ASSESSMENT/PLAN:  The patient has MRI that shows cord compression  due to adjacent level degenerative disc disease at C5-6.  This is contributing to his neck pain, upper extremity pain numbness and tingling.  I do think a component of his unsteady gait is due to his neck.  Although this could be a multifactorial issue given his weight, chronic back pain, diabetes.  He has been through a dedicated course of physical therapy.  He has been treating this with oral pain medicine.  At this point I would recommend a single level extension of the anterior cervical fusion at C5-6.  There is some benefits were discussed at length which include were not limited to infection, bleeding, paralysis, spinal fluid leak, speech 12 difficulty, stroke, coma, and death.  In addition I was very clear with Erick and his wife that it is difficult to predict how much of his chronic neck pain is going to improve even with surgery.  Acknowledged understanding this.  Their questions and concerns were addressed.      1. Spinal stenosis in cervical region    2. Degeneration of cervical intervertebral disc    3. Cervical radiculopathy    4. Abnormality of gait    5. Non-smoker    6. Class 3 severe obesity due to excess calories with serious comorbidity and body mass index (BMI) of 45.0 to 49.9 in adult (CMS/[facility])        The patient's Body mass index is 45.08 kg/m².. BMI is above normal parameters. Recommendations include: educational material    Return for POSTOPERATIVELY.      Karel Soliz MD

## 2021-09-20 ENCOUNTER — TELEPHONE (OUTPATIENT)
Dept: NEUROSURGERY | Facility: CLINIC | Age: 66
End: 2021-09-20

## 2021-09-20 NOTE — TELEPHONE ENCOUNTER
[address], [address]  Located within [facility] [address]  Phone: [phone]  Fax: [phone]                       Dear Dr Tanisha Luong is scheduled for surgery in early December 2021  with Dr Soliz.        Surgery name: CERVICAL DISCECTOMY, ANTERIOR INTERBODY FUSION C5-6     In order to do this procedure, we are requesting cardiac clearance as well as a request to hold anticoagulation medications.         Medication: ASA 81 MG     Amount of days medication needs to be held: TO BE DETERMINED BY CARDIOLOGY                      Thank you,       Deborah Paz   Surgery Scheduler  [facility]   Karel Soliz MD/ Kavon Ybarra MD

## 2021-09-21 NOTE — TELEPHONE ENCOUNTER
With this patient's history of coronary artery disease, it would not be advisable for him to discontinue aspirin.  While this may increase his surgical bleeding risk, the risk of bypass graft failure while off of aspirin is rather significant and I would advise to avoid holding aspirin if at all possible.

## 2021-10-28 ENCOUNTER — APPOINTMENT (OUTPATIENT)
Dept: GENERAL RADIOLOGY | Facility: HOSPITAL | Age: 66
End: 2021-10-28

## 2021-10-28 ENCOUNTER — NURSE TRIAGE (OUTPATIENT)
Dept: CALL CENTER | Facility: HOSPITAL | Age: 66
End: 2021-10-28

## 2021-10-28 ENCOUNTER — APPOINTMENT (OUTPATIENT)
Dept: CT IMAGING | Facility: HOSPITAL | Age: 66
End: 2021-10-28

## 2021-10-28 ENCOUNTER — HOME HEALTH ADMISSION (OUTPATIENT)
Dept: HOME HEALTH SERVICES | Facility: HOME HEALTHCARE | Age: 66
End: 2021-10-28

## 2021-10-28 ENCOUNTER — APPOINTMENT (OUTPATIENT)
Dept: ULTRASOUND IMAGING | Facility: HOSPITAL | Age: 66
End: 2021-10-28

## 2021-10-28 ENCOUNTER — HOSPITAL ENCOUNTER (EMERGENCY)
Facility: HOSPITAL | Age: 66
Discharge: HOME | End: 2021-10-28
Attending: FAMILY MEDICINE | Admitting: FAMILY MEDICINE

## 2021-10-28 ENCOUNTER — TRANSCRIBE ORDERS (OUTPATIENT)
Dept: HOME HEALTH SERVICES | Facility: HOME HEALTHCARE | Age: 66
End: 2021-10-28

## 2021-10-28 VITALS
DIASTOLIC BLOOD PRESSURE: 62 MMHG | SYSTOLIC BLOOD PRESSURE: 147 MMHG | OXYGEN SATURATION: 98 % | RESPIRATION RATE: 16 BRPM | HEIGHT: 72 IN | WEIGHT: 315 LBS | TEMPERATURE: 98.2 F | BODY MASS INDEX: 42.66 KG/M2 | HEART RATE: 72 BPM

## 2021-10-28 DIAGNOSIS — Z47.81 ENCOUNTER FOR ORTHOPEDIC AFTERCARE FOLLOWING SURGICAL AMPUTATION: Primary | ICD-10-CM

## 2021-10-28 DIAGNOSIS — Z78.9 PROBLEM WITH VASCULAR ACCESS: Primary | ICD-10-CM

## 2021-10-28 LAB
ALBUMIN SERPL-MCNC: 3.4 G/DL (ref 3.5–5.2)
ALBUMIN/GLOB SERPL: 1 G/DL
ALP SERPL-CCNC: 125 U/L (ref 39–117)
ALT SERPL W P-5'-P-CCNC: 25 U/L (ref 1–41)
ANION GAP SERPL CALCULATED.3IONS-SCNC: 11 MMOL/L (ref 5–15)
APTT PPP: 30.2 SECONDS (ref 24.1–35)
AST SERPL-CCNC: 20 U/L (ref 1–40)
BASOPHILS # BLD AUTO: 0.05 10*3/MM3 (ref 0–0.2)
BASOPHILS NFR BLD AUTO: 0.5 % (ref 0–1.5)
BILIRUB SERPL-MCNC: 0.3 MG/DL (ref 0–1.2)
BUN SERPL-MCNC: 26 MG/DL (ref 8–23)
BUN/CREAT SERPL: 15.9 (ref 7–25)
CALCIUM SPEC-SCNC: 9 MG/DL (ref 8.6–10.5)
CHLORIDE SERPL-SCNC: 102 MMOL/L (ref 98–107)
CO2 SERPL-SCNC: 23 MMOL/L (ref 22–29)
CREAT SERPL-MCNC: 1.64 MG/DL (ref 0.76–1.27)
D DIMER PPP FEU-MCNC: 1.53 MG/L (FEU) (ref 0–0.5)
DEPRECATED RDW RBC AUTO: 46 FL (ref 37–54)
EOSINOPHIL # BLD AUTO: 0.62 10*3/MM3 (ref 0–0.4)
EOSINOPHIL NFR BLD AUTO: 6.8 % (ref 0.3–6.2)
ERYTHROCYTE [DISTWIDTH] IN BLOOD BY AUTOMATED COUNT: 14.2 % (ref 12.3–15.4)
GFR SERPL CREATININE-BSD FRML MDRD: 42 ML/MIN/1.73
GLOBULIN UR ELPH-MCNC: 3.4 GM/DL
GLUCOSE SERPL-MCNC: 209 MG/DL (ref 65–99)
HCT VFR BLD AUTO: 31.4 % (ref 37.5–51)
HGB BLD-MCNC: 10.1 G/DL (ref 13–17.7)
HOLD SPECIMEN: NORMAL
IMM GRANULOCYTES # BLD AUTO: 0.13 10*3/MM3 (ref 0–0.05)
IMM GRANULOCYTES NFR BLD AUTO: 1.4 % (ref 0–0.5)
INR PPP: 1.05 (ref 0.91–1.09)
LYMPHOCYTES # BLD AUTO: 1.17 10*3/MM3 (ref 0.7–3.1)
LYMPHOCYTES NFR BLD AUTO: 12.8 % (ref 19.6–45.3)
MCH RBC QN AUTO: 28.9 PG (ref 26.6–33)
MCHC RBC AUTO-ENTMCNC: 32.2 G/DL (ref 31.5–35.7)
MCV RBC AUTO: 89.7 FL (ref 79–97)
MONOCYTES # BLD AUTO: 0.77 10*3/MM3 (ref 0.1–0.9)
MONOCYTES NFR BLD AUTO: 8.4 % (ref 5–12)
NEUTROPHILS NFR BLD AUTO: 6.42 10*3/MM3 (ref 1.7–7)
NEUTROPHILS NFR BLD AUTO: 70.1 % (ref 42.7–76)
NRBC BLD AUTO-RTO: 0 /100 WBC (ref 0–0.2)
NT-PROBNP SERPL-MCNC: 1819 PG/ML (ref 0–900)
PLATELET # BLD AUTO: 395 10*3/MM3 (ref 140–450)
PMV BLD AUTO: 10.2 FL (ref 6–12)
POTASSIUM SERPL-SCNC: 5.4 MMOL/L (ref 3.5–5.2)
PROT SERPL-MCNC: 6.8 G/DL (ref 6–8.5)
PROTHROMBIN TIME: 13.3 SECONDS (ref 11.9–14.6)
RBC # BLD AUTO: 3.5 10*6/MM3 (ref 4.14–5.8)
SODIUM SERPL-SCNC: 136 MMOL/L (ref 136–145)
WBC # BLD AUTO: 9.16 10*3/MM3 (ref 3.4–10.8)

## 2021-10-28 PROCEDURE — 71275 CT ANGIOGRAPHY CHEST: CPT

## 2021-10-28 PROCEDURE — 83880 ASSAY OF NATRIURETIC PEPTIDE: CPT | Performed by: FAMILY MEDICINE

## 2021-10-28 PROCEDURE — 85730 THROMBOPLASTIN TIME PARTIAL: CPT | Performed by: FAMILY MEDICINE

## 2021-10-28 PROCEDURE — 85610 PROTHROMBIN TIME: CPT | Performed by: FAMILY MEDICINE

## 2021-10-28 PROCEDURE — 93971 EXTREMITY STUDY: CPT | Performed by: SURGERY

## 2021-10-28 PROCEDURE — 71045 X-RAY EXAM CHEST 1 VIEW: CPT

## 2021-10-28 PROCEDURE — 85379 FIBRIN DEGRADATION QUANT: CPT | Performed by: FAMILY MEDICINE

## 2021-10-28 PROCEDURE — 85025 COMPLETE CBC W/AUTO DIFF WBC: CPT | Performed by: FAMILY MEDICINE

## 2021-10-28 PROCEDURE — 93971 EXTREMITY STUDY: CPT

## 2021-10-28 PROCEDURE — 80053 COMPREHEN METABOLIC PANEL: CPT | Performed by: FAMILY MEDICINE

## 2021-10-28 PROCEDURE — 99283 EMERGENCY DEPT VISIT LOW MDM: CPT

## 2021-10-28 PROCEDURE — 0 IOPAMIDOL PER 1 ML: Performed by: FAMILY MEDICINE

## 2021-10-28 RX ADMIN — SODIUM CHLORIDE 500 ML: 9 INJECTION, SOLUTION INTRAVENOUS at 14:12

## 2021-10-28 RX ADMIN — IOPAMIDOL 100 ML: 755 INJECTION, SOLUTION INTRAVENOUS at 14:03

## 2021-10-28 NOTE — TELEPHONE ENCOUNTER
Caller states that her  is out of his avapro.  [facility] pharmacy sent a refill request on Monday.  Patient also was discharged from [facility] last night after having his left little toe amputated.  He now has a wound vac.  Caller states that they are on their way to [facility] ER due to his IV access, when she went to flush it she states that blood went everywhere, not sure if has a clot or it needs to be changed out.  Dr. Shetty paged for refill.   Reason for Disposition  • [1] Caller has URGENT medicine question about med that PCP or specialist prescribed AND [2] triager unable to answer question    Additional Information  • Negative: [1] Intentional drug overdose AND [2] suicidal thoughts or ideas  • Negative: Drug overdose and triager unable to answer question  • Negative: Caller requesting information unrelated to medicine  • Negative: Caller requesting information about COVID-19 Vaccine  • Negative: Caller requesting information about Emergency Contraception  • Negative: Caller requesting information about Combined Birth Control Pills  • Negative: Caller requesting information about Progestin Birth Control Pills  • Negative: Caller requesting information about Post-Op pain or medicines  • Negative: Caller requesting a prescription antibiotic (such as Penicillin) for Strep throat and has a positive culture result  • Negative: Caller requesting a prescription anti-viral med (such as Tamiflu) and has influenza (flu)  symptoms  • Negative: Immunization reaction suspected  • Negative: Rash while taking a medicine or within 3 days of stopping it  • Negative: [1] Asthma and [2] having symptoms of asthma (cough, wheezing, etc.)  • Negative: [1] Symptom of illness (e.g., headache, abdominal pain, earache, vomiting) AND [2] more than mild  • Negative: Breastfeeding questions about mother's medicines and diet  • Negative: MORE THAN A DOUBLE DOSE of a prescription or over-the-counter (OTC) drug  • Negative: [1] DOUBLE DOSE (an  "extra dose or lesser amount) of prescription drug AND [2] any symptoms (e.g., dizziness, nausea, pain, sleepiness)  • Negative: [1] DOUBLE DOSE (an extra dose or lesser amount) of over-the-counter (OTC) drug AND [2] any symptoms (e.g., dizziness, nausea, pain, sleepiness)  • Negative: Took another person's prescription drug  • Negative: [1] DOUBLE DOSE (an extra dose or lesser amount) of prescription drug AND [2] NO symptoms (Exception: a double dose of antibiotics)  • Negative: Diabetes drug error or overdose (e.g., took wrong type of insulin or took extra dose)  • Negative: [1] Prescription refill request for ESSENTIAL medicine (i.e., likelihood of harm to patient if not taken) AND [2] triager unable to refill per department policy  • Negative: [1] Prescription not at pharmacy AND [2] was prescribed by PCP recently (Exception: triager has access to EMR and prescription is recorded there. Go to Home Care and confirm for pharmacy.)  • Negative: [1] Pharmacy calling with prescription question AND [2] triager unable to answer question  • Negative: Medicine patch causing local rash or itching  • Negative: [1] Caller has medicine question about med NOT prescribed by PCP AND [2] triager unable to answer question (e.g., compatibility with other med, storage)  • Negative: Prescription request for new medicine (not a refill)    Answer Assessment - Initial Assessment Questions  1. NAME of MEDICATION: \"What medicine are you calling about?\"      avapro  2. QUESTION: \"What is your question?\" (e.g., medication refill, side effect)      He is out and the pharmacy sent a refill request on Monday  3. PRESCRIBING HCP: \"Who prescribed it?\" Reason: if prescribed by specialist, call should be referred to that group.      Dr. Shetty  4. SYMPTOMS: \"Do you have any symptoms?\"      na  5. SEVERITY: If symptoms are present, ask \"Are they mild, moderate or severe?\"      na  6. PREGNANCY:  \"Is there any chance that you are pregnant?\" \"When was " "your last menstrual period?\"      na    Protocols used: MEDICATION QUESTION CALL-ADULT-    "

## 2021-10-29 ENCOUNTER — HOME CARE VISIT (OUTPATIENT)
Dept: HOME HEALTH SERVICES | Facility: CLINIC | Age: 66
End: 2021-10-29

## 2021-10-29 PROCEDURE — G0299 HHS/HOSPICE OF RN EA 15 MIN: HCPCS

## 2021-10-31 VITALS
WEIGHT: 315 LBS | SYSTOLIC BLOOD PRESSURE: 130 MMHG | DIASTOLIC BLOOD PRESSURE: 60 MMHG | TEMPERATURE: 97.8 F | HEART RATE: 70 BPM | OXYGEN SATURATION: 96 % | BODY MASS INDEX: 61.84 KG/M2 | HEIGHT: 60 IN | RESPIRATION RATE: 18 BRPM

## 2021-11-01 ENCOUNTER — HOME CARE VISIT (OUTPATIENT)
Dept: HOME HEALTH SERVICES | Facility: CLINIC | Age: 66
End: 2021-11-01

## 2021-11-01 PROCEDURE — G0299 HHS/HOSPICE OF RN EA 15 MIN: HCPCS

## 2021-11-02 ENCOUNTER — HOME CARE VISIT (OUTPATIENT)
Dept: HOME HEALTH SERVICES | Facility: HOME HEALTHCARE | Age: 66
End: 2021-11-02

## 2021-11-02 ENCOUNTER — HOME CARE VISIT (OUTPATIENT)
Dept: HOME HEALTH SERVICES | Facility: CLINIC | Age: 66
End: 2021-11-02

## 2021-11-02 VITALS
HEART RATE: 83 BPM | OXYGEN SATURATION: 98 % | TEMPERATURE: 97.5 F | SYSTOLIC BLOOD PRESSURE: 154 MMHG | DIASTOLIC BLOOD PRESSURE: 72 MMHG | RESPIRATION RATE: 18 BRPM

## 2021-11-02 VITALS
RESPIRATION RATE: 16 BRPM | SYSTOLIC BLOOD PRESSURE: 160 MMHG | TEMPERATURE: 97.8 F | HEART RATE: 70 BPM | DIASTOLIC BLOOD PRESSURE: 60 MMHG | OXYGEN SATURATION: 99 %

## 2021-11-02 PROCEDURE — G0299 HHS/HOSPICE OF RN EA 15 MIN: HCPCS

## 2021-11-02 NOTE — HOME HEALTH
SN called to home of pt for PRN visit for Midline complications. SN cleansed hub with alcohol and flushed midline with 10ml normal saline. Minimal resistance met. SN changed dressing to midline due to some leaking. SN tightened line and flushed a second time with 10 ml normal saline. No resistance met and no leakage noted. SN instructed wife to take IV abx out of refridgerator at least 30 min prior to administering, flush with 10 ml normal saline in between abx doses and lock with 10 ml saline and 5 ml heparin when abx complete each time. Wife v/u. SN instructed wife to flush line with 10 ml normal saline anytime she sees blood backed up into like and keep line locked when not in use. Wife v/u.

## 2021-11-03 ENCOUNTER — HOME CARE VISIT (OUTPATIENT)
Dept: HOME HEALTH SERVICES | Facility: CLINIC | Age: 66
End: 2021-11-03

## 2021-11-05 ENCOUNTER — HOME CARE VISIT (OUTPATIENT)
Dept: HOME HEALTH SERVICES | Facility: CLINIC | Age: 66
End: 2021-11-05

## 2021-11-06 ENCOUNTER — HOSPITAL ENCOUNTER (EMERGENCY)
Facility: HOSPITAL | Age: 66
Discharge: HOME | End: 2021-11-07
Attending: STUDENT IN AN ORGANIZED HEALTH CARE EDUCATION/TRAINING PROGRAM | Admitting: STUDENT IN AN ORGANIZED HEALTH CARE EDUCATION/TRAINING PROGRAM

## 2021-11-06 DIAGNOSIS — F41.9 ANXIETY: Primary | ICD-10-CM

## 2021-11-06 DIAGNOSIS — M54.2 NECK PAIN: ICD-10-CM

## 2021-11-06 PROCEDURE — 99284 EMERGENCY DEPT VISIT MOD MDM: CPT

## 2021-11-07 ENCOUNTER — APPOINTMENT (OUTPATIENT)
Dept: GENERAL RADIOLOGY | Facility: HOSPITAL | Age: 66
End: 2021-11-07

## 2021-11-07 ENCOUNTER — APPOINTMENT (OUTPATIENT)
Dept: CT IMAGING | Facility: HOSPITAL | Age: 66
End: 2021-11-07

## 2021-11-07 VITALS
RESPIRATION RATE: 18 BRPM | HEART RATE: 77 BPM | BODY MASS INDEX: 60.27 KG/M2 | OXYGEN SATURATION: 98 % | DIASTOLIC BLOOD PRESSURE: 89 MMHG | SYSTOLIC BLOOD PRESSURE: 133 MMHG | WEIGHT: 307 LBS | TEMPERATURE: 97.9 F | HEIGHT: 60 IN

## 2021-11-07 LAB
ALBUMIN SERPL-MCNC: 3.4 G/DL (ref 3.5–5.2)
ALBUMIN/GLOB SERPL: 1.3 G/DL
ALP SERPL-CCNC: 94 U/L (ref 39–117)
ALT SERPL W P-5'-P-CCNC: 13 U/L (ref 1–41)
ANION GAP SERPL CALCULATED.3IONS-SCNC: 13 MMOL/L (ref 5–15)
APTT PPP: 31 SECONDS (ref 24.1–35)
AST SERPL-CCNC: 13 U/L (ref 1–40)
BASOPHILS # BLD AUTO: 0.06 10*3/MM3 (ref 0–0.2)
BASOPHILS NFR BLD AUTO: 0.7 % (ref 0–1.5)
BILIRUB SERPL-MCNC: 0.3 MG/DL (ref 0–1.2)
BUN SERPL-MCNC: 33 MG/DL (ref 8–23)
BUN/CREAT SERPL: 19.9 (ref 7–25)
CALCIUM SPEC-SCNC: 8.7 MG/DL (ref 8.6–10.5)
CHLORIDE SERPL-SCNC: 101 MMOL/L (ref 98–107)
CK SERPL-CCNC: 111 U/L (ref 20–200)
CO2 SERPL-SCNC: 22 MMOL/L (ref 22–29)
CREAT SERPL-MCNC: 1.66 MG/DL (ref 0.76–1.27)
CRP SERPL-MCNC: <0.3 MG/DL (ref 0–0.5)
D DIMER PPP FEU-MCNC: 0.83 MG/L (FEU) (ref 0–0.5)
DEPRECATED RDW RBC AUTO: 44.2 FL (ref 37–54)
EOSINOPHIL # BLD AUTO: 0.62 10*3/MM3 (ref 0–0.4)
EOSINOPHIL NFR BLD AUTO: 7.7 % (ref 0.3–6.2)
ERYTHROCYTE [DISTWIDTH] IN BLOOD BY AUTOMATED COUNT: 14.1 % (ref 12.3–15.4)
ERYTHROCYTE [SEDIMENTATION RATE] IN BLOOD: 38 MM/HR (ref 0–20)
GFR SERPL CREATININE-BSD FRML MDRD: 42 ML/MIN/1.73
GLOBULIN UR ELPH-MCNC: 2.7 GM/DL
GLUCOSE BLDC GLUCOMTR-MCNC: 335 MG/DL (ref 70–130)
GLUCOSE SERPL-MCNC: 424 MG/DL (ref 65–99)
HCT VFR BLD AUTO: 30.3 % (ref 37.5–51)
HGB BLD-MCNC: 9.8 G/DL (ref 13–17.7)
IMM GRANULOCYTES # BLD AUTO: 0.03 10*3/MM3 (ref 0–0.05)
IMM GRANULOCYTES NFR BLD AUTO: 0.4 % (ref 0–0.5)
INR PPP: 1.06 (ref 0.91–1.09)
LYMPHOCYTES # BLD AUTO: 1.35 10*3/MM3 (ref 0.7–3.1)
LYMPHOCYTES NFR BLD AUTO: 16.7 % (ref 19.6–45.3)
MAGNESIUM SERPL-MCNC: 2 MG/DL (ref 1.6–2.4)
MCH RBC QN AUTO: 28.1 PG (ref 26.6–33)
MCHC RBC AUTO-ENTMCNC: 32.3 G/DL (ref 31.5–35.7)
MCV RBC AUTO: 86.8 FL (ref 79–97)
MONOCYTES # BLD AUTO: 0.75 10*3/MM3 (ref 0.1–0.9)
MONOCYTES NFR BLD AUTO: 9.3 % (ref 5–12)
NEUTROPHILS NFR BLD AUTO: 5.27 10*3/MM3 (ref 1.7–7)
NEUTROPHILS NFR BLD AUTO: 65.2 % (ref 42.7–76)
NRBC BLD AUTO-RTO: 0 /100 WBC (ref 0–0.2)
PLATELET # BLD AUTO: 300 10*3/MM3 (ref 140–450)
PMV BLD AUTO: 10.7 FL (ref 6–12)
POTASSIUM SERPL-SCNC: 4 MMOL/L (ref 3.5–5.2)
PROT SERPL-MCNC: 6.1 G/DL (ref 6–8.5)
PROTHROMBIN TIME: 13.4 SECONDS (ref 11.9–14.6)
QT INTERVAL: 374 MS
QTC INTERVAL: 439 MS
RBC # BLD AUTO: 3.49 10*6/MM3 (ref 4.14–5.8)
SARS-COV-2 RNA PNL SPEC NAA+PROBE: NOT DETECTED
SODIUM SERPL-SCNC: 136 MMOL/L (ref 136–145)
T4 FREE SERPL-MCNC: 1.12 NG/DL (ref 0.93–1.7)
TROPONIN T SERPL-MCNC: <0.01 NG/ML (ref 0–0.03)
TSH SERPL DL<=0.05 MIU/L-ACNC: 2.05 UIU/ML (ref 0.27–4.2)
WBC # BLD AUTO: 8.08 10*3/MM3 (ref 3.4–10.8)

## 2021-11-07 PROCEDURE — 70498 CT ANGIOGRAPHY NECK: CPT

## 2021-11-07 PROCEDURE — 93010 ELECTROCARDIOGRAM REPORT: CPT | Performed by: INTERNAL MEDICINE

## 2021-11-07 PROCEDURE — 0 IOPAMIDOL PER 1 ML: Performed by: STUDENT IN AN ORGANIZED HEALTH CARE EDUCATION/TRAINING PROGRAM

## 2021-11-07 PROCEDURE — 70450 CT HEAD/BRAIN W/O DYE: CPT

## 2021-11-07 PROCEDURE — 72125 CT NECK SPINE W/O DYE: CPT

## 2021-11-07 PROCEDURE — 71260 CT THORAX DX C+: CPT

## 2021-11-07 PROCEDURE — 71045 X-RAY EXAM CHEST 1 VIEW: CPT

## 2021-11-07 PROCEDURE — 85730 THROMBOPLASTIN TIME PARTIAL: CPT | Performed by: STUDENT IN AN ORGANIZED HEALTH CARE EDUCATION/TRAINING PROGRAM

## 2021-11-07 PROCEDURE — 96374 THER/PROPH/DIAG INJ IV PUSH: CPT

## 2021-11-07 PROCEDURE — 83735 ASSAY OF MAGNESIUM: CPT | Performed by: STUDENT IN AN ORGANIZED HEALTH CARE EDUCATION/TRAINING PROGRAM

## 2021-11-07 PROCEDURE — 84484 ASSAY OF TROPONIN QUANT: CPT | Performed by: STUDENT IN AN ORGANIZED HEALTH CARE EDUCATION/TRAINING PROGRAM

## 2021-11-07 PROCEDURE — 85025 COMPLETE CBC W/AUTO DIFF WBC: CPT | Performed by: STUDENT IN AN ORGANIZED HEALTH CARE EDUCATION/TRAINING PROGRAM

## 2021-11-07 PROCEDURE — 86140 C-REACTIVE PROTEIN: CPT | Performed by: STUDENT IN AN ORGANIZED HEALTH CARE EDUCATION/TRAINING PROGRAM

## 2021-11-07 PROCEDURE — 63710000001 INSULIN REGULAR HUMAN PER 5 UNITS: Performed by: STUDENT IN AN ORGANIZED HEALTH CARE EDUCATION/TRAINING PROGRAM

## 2021-11-07 PROCEDURE — 74177 CT ABD & PELVIS W/CONTRAST: CPT

## 2021-11-07 PROCEDURE — 25010000002 HYDROMORPHONE PER 4 MG: Performed by: STUDENT IN AN ORGANIZED HEALTH CARE EDUCATION/TRAINING PROGRAM

## 2021-11-07 PROCEDURE — 25010000002 FENTANYL CITRATE (PF) 50 MCG/ML SOLUTION

## 2021-11-07 PROCEDURE — 36415 COLL VENOUS BLD VENIPUNCTURE: CPT

## 2021-11-07 PROCEDURE — 73630 X-RAY EXAM OF FOOT: CPT

## 2021-11-07 PROCEDURE — 85652 RBC SED RATE AUTOMATED: CPT | Performed by: STUDENT IN AN ORGANIZED HEALTH CARE EDUCATION/TRAINING PROGRAM

## 2021-11-07 PROCEDURE — 85610 PROTHROMBIN TIME: CPT | Performed by: STUDENT IN AN ORGANIZED HEALTH CARE EDUCATION/TRAINING PROGRAM

## 2021-11-07 PROCEDURE — 85379 FIBRIN DEGRADATION QUANT: CPT | Performed by: STUDENT IN AN ORGANIZED HEALTH CARE EDUCATION/TRAINING PROGRAM

## 2021-11-07 PROCEDURE — 87635 SARS-COV-2 COVID-19 AMP PRB: CPT | Performed by: STUDENT IN AN ORGANIZED HEALTH CARE EDUCATION/TRAINING PROGRAM

## 2021-11-07 PROCEDURE — 96375 TX/PRO/DX INJ NEW DRUG ADDON: CPT

## 2021-11-07 PROCEDURE — 93005 ELECTROCARDIOGRAM TRACING: CPT

## 2021-11-07 PROCEDURE — 84439 ASSAY OF FREE THYROXINE: CPT | Performed by: STUDENT IN AN ORGANIZED HEALTH CARE EDUCATION/TRAINING PROGRAM

## 2021-11-07 PROCEDURE — 87040 BLOOD CULTURE FOR BACTERIA: CPT | Performed by: STUDENT IN AN ORGANIZED HEALTH CARE EDUCATION/TRAINING PROGRAM

## 2021-11-07 PROCEDURE — C9803 HOPD COVID-19 SPEC COLLECT: HCPCS

## 2021-11-07 PROCEDURE — 70496 CT ANGIOGRAPHY HEAD: CPT

## 2021-11-07 PROCEDURE — 80053 COMPREHEN METABOLIC PANEL: CPT | Performed by: STUDENT IN AN ORGANIZED HEALTH CARE EDUCATION/TRAINING PROGRAM

## 2021-11-07 PROCEDURE — 84443 ASSAY THYROID STIM HORMONE: CPT | Performed by: STUDENT IN AN ORGANIZED HEALTH CARE EDUCATION/TRAINING PROGRAM

## 2021-11-07 PROCEDURE — 93005 ELECTROCARDIOGRAM TRACING: CPT | Performed by: STUDENT IN AN ORGANIZED HEALTH CARE EDUCATION/TRAINING PROGRAM

## 2021-11-07 PROCEDURE — 82962 GLUCOSE BLOOD TEST: CPT

## 2021-11-07 PROCEDURE — 82550 ASSAY OF CK (CPK): CPT | Performed by: STUDENT IN AN ORGANIZED HEALTH CARE EDUCATION/TRAINING PROGRAM

## 2021-11-07 RX ORDER — FENTANYL CITRATE 50 UG/ML
50 INJECTION, SOLUTION INTRAMUSCULAR; INTRAVENOUS ONCE
Status: COMPLETED | OUTPATIENT
Start: 2021-11-07 | End: 2021-11-07

## 2021-11-07 RX ORDER — FENTANYL CITRATE 50 UG/ML
INJECTION, SOLUTION INTRAMUSCULAR; INTRAVENOUS
Status: COMPLETED
Start: 2021-11-07 | End: 2021-11-07

## 2021-11-07 RX ORDER — DIAZEPAM 5 MG/1
5 TABLET ORAL ONCE
Status: DISCONTINUED | OUTPATIENT
Start: 2021-11-07 | End: 2021-11-07

## 2021-11-07 RX ORDER — FENTANYL CITRATE 50 UG/ML
50 INJECTION, SOLUTION INTRAMUSCULAR; INTRAVENOUS ONCE
Status: DISCONTINUED | OUTPATIENT
Start: 2021-11-07 | End: 2021-11-07

## 2021-11-07 RX ORDER — DIAZEPAM 5 MG/1
5 TABLET ORAL ONCE
Status: COMPLETED | OUTPATIENT
Start: 2021-11-07 | End: 2021-11-07

## 2021-11-07 RX ORDER — HYDROMORPHONE HYDROCHLORIDE 1 MG/ML
0.5 INJECTION, SOLUTION INTRAMUSCULAR; INTRAVENOUS; SUBCUTANEOUS ONCE
Status: COMPLETED | OUTPATIENT
Start: 2021-11-07 | End: 2021-11-07

## 2021-11-07 RX ADMIN — FENTANYL CITRATE 50 MCG: 50 INJECTION, SOLUTION INTRAMUSCULAR; INTRAVENOUS at 01:18

## 2021-11-07 RX ADMIN — IOPAMIDOL 125 ML: 755 INJECTION, SOLUTION INTRAVENOUS at 03:13

## 2021-11-07 RX ADMIN — FENTANYL CITRATE 50 MCG: 50 INJECTION INTRAMUSCULAR; INTRAVENOUS at 01:18

## 2021-11-07 RX ADMIN — INSULIN HUMAN 5 UNITS: 100 INJECTION, SOLUTION PARENTERAL at 02:39

## 2021-11-07 RX ADMIN — DIAZEPAM 5 MG: 5 TABLET ORAL at 01:43

## 2021-11-07 RX ADMIN — HYDROMORPHONE HYDROCHLORIDE 0.5 MG: 1 INJECTION, SOLUTION INTRAMUSCULAR; INTRAVENOUS; SUBCUTANEOUS at 00:30

## 2021-11-07 RX ADMIN — SODIUM CHLORIDE, POTASSIUM CHLORIDE, SODIUM LACTATE AND CALCIUM CHLORIDE 1000 ML: 600; 310; 30; 20 INJECTION, SOLUTION INTRAVENOUS at 00:27

## 2021-11-07 NOTE — ED NOTES
MD Perez at bedside updating pt and family about plan of care      Erin Thomas RN  11/07/21 5150

## 2021-11-07 NOTE — DISCHARGE INSTRUCTIONS
You were evaluated in the ER for weakness. Your workup showed no indication at this time for admission to the hospital. Please use your home medications for symptomatic improvement. You were offered admission for further workup with MRI and physical therapy but you elected to follow up as an outpatient. Like we discussed, if anything changes or you have other concerns, please return to the ER as soon as possible.    It is VERY IMPORTANT that you follow up (call them to set up an appointment) with your primary care doctor* within the next few days or as soon as possible so that you can be re-evaluated for improvement in your symptoms or for any other questions. If you were prescribed any medications, please take them as directed or call us back with any questions.     Return to the ER within 24-48 hours if you have any new symptoms, worsening symptoms, or any other concerns.

## 2021-11-07 NOTE — ED NOTES
md stevenson at bedside for pt reassessment, tearful c/o intense left arm pain      Erin Thomas, RN  11/07/21 4713

## 2021-11-07 NOTE — ED NOTES
Pt c/o left arm pain, md stevenson notified and new orders received      Erin Thomas RN  11/07/21 021

## 2021-11-08 ENCOUNTER — HOME CARE VISIT (OUTPATIENT)
Dept: HOME HEALTH SERVICES | Facility: CLINIC | Age: 66
End: 2021-11-08

## 2021-11-08 NOTE — ED PROVIDER NOTES
Subjective   Patient's wife provides most of the history and states that the patient has been having intermittent episodes of confusion and hallucinations over the past few weeks.  States that she has noticed depression that he takes medications for and ever since he was started on some pain medication he has been having some symptoms.  She states that he has just declined and she is not sure what to do with him at home.  She states that he began having severe pain in his left arm and left chest and has a history of spinal stenosis that he is supposed to have surgery for with Dr. Soliz.  She states that he has not had any fevers or chills recently.  She states that he has been having some pain in his left foot after surgery recently.  She states that he has a right arm PICC line and as well that he gets meropenem through.  Denies any other complaints at this time.  She states that recently he has had changes to his vision recently were he almost loses it completely.  She states that she is not sure what position he is in when this happens.  She states that he does not like to lie flat and usually just stands or sits up.          Review of Systems   All other systems reviewed and are negative.      Past Medical History:   Diagnosis Date   • Arthritis    • Autonomic disease    • CAD (coronary artery disease) 2/6/2017   • Coronary artery disease    • Diabetes mellitus (HCC)    • Gastroesophageal reflux disease 5/13/2019   • HTN (hypertension), benign 5/3/2017   • Hyperlipidemia    • Hypertension    • Mixed hyperlipidemia 2/7/2017   • Multiple lung nodules 1/26/2020    5mm, 9 mm RLL identified 1/2020, not present 10/2019.   • Myocardial infarction (HCC)    • Osteomyelitis (HCC) 1/22/2020   • Osteomyelitis of fifth toe of right foot (HCC) 10/7/2019   • Pancreatitis    • Persistent insomnia 1/20/2020   • Renal disorder    • Sleep apnea 2/6/2017   • Sleep apnea with use of continuous positive airway pressure (CPAP)    •  "Slow transit constipation 2019       Allergies   Allergen Reactions   • Cefepime Hives and Anaphylaxis   • Bactrim [Sulfamethoxazole-Trimethoprim] Other (See Comments)     \"RENAL FAILURE\"   • Clindamycin Itching   • Vancomycin Itching   • Metronidazole Rash       Past Surgical History:   Procedure Laterality Date   • ABDOMINAL SURGERY     • APPENDECTOMY     • BACK SURGERY     • CARDIAC CATHETERIZATION Left 2021    Procedure: Left Heart Cath w poss intervention left anatomical snuff box acess;  Surgeon: Omkar Charles DO;  Location: [facility] CATH INVASIVE LOCATION;  Service: Cardiology;  Laterality: Left;   • CARDIAC SURGERY     • CATARACT EXTRACTION     • CERVICAL SPINE SURGERY     • COLONOSCOPY N/A 2017    Normal exam repeat in 5 years   • COLONOSCOPY N/A 2019    Mild acute inflammation   • COLONOSCOPY W/ POLYPECTOMY  2014    Hyperplastic polyp   • CORONARY ARTERY BYPASS GRAFT  10/2015   • ENDOSCOPY  2011    Gastritis with hemorrhage   • ENDOSCOPY N/A 2017    Normal exam   • ENDOSCOPY N/A 2019    Gastritis   • ENDOSCOPY N/A 2020    Non-erosive gastritis with hemorrhage   • ENDOSCOPY N/A 2/10/2021    Procedure: ESOPHAGOGASTRODUODENOSCOPY WITH ANESTHESIA;  Surgeon: Cristopher Hannah MD;  Location: [facility] ENDOSCOPY;  Service: Gastroenterology;  Laterality: N/A;  preop; chest pain  postop; esophagitis;   PCP Del Shetty    • INCISION AND DRAINAGE OF WOUND Left 2007    spider bite       Family History   Problem Relation Age of Onset   • Colon cancer Father    • Heart disease Father    • Colon cancer Sister    • Colon polyps Sister    • Alzheimer's disease Mother    • Coronary artery disease Sister    • Coronary artery disease Sister        Social History     Socioeconomic History   • Marital status:    Tobacco Use   • Smoking status: Former Smoker     Quit date:      Years since quittin.8   • Smokeless tobacco: Never Used   • Tobacco comment: " smoked in highschool   Substance and Sexual Activity   • Alcohol use: No   • Drug use: No   • Sexual activity: Defer           Objective   Physical Exam  Vitals and nursing note reviewed.   Constitutional:       General: He is not in acute distress.     Appearance: He is not ill-appearing, toxic-appearing or diaphoretic.   HENT:      Head: Normocephalic and atraumatic.      Nose: Nose normal.   Eyes:      General:         Right eye: No discharge.         Left eye: No discharge.      Extraocular Movements: Extraocular movements intact.      Conjunctiva/sclera: Conjunctivae normal.   Neck:      Comments: TTP over midline cervical spine    Cardiovascular:      Rate and Rhythm: Normal rate and regular rhythm.      Pulses: Normal pulses.      Heart sounds: Normal heart sounds.   Pulmonary:      Effort: Pulmonary effort is normal. No respiratory distress.      Breath sounds: No stridor. No rhonchi.   Abdominal:      General: Abdomen is flat. There is distension.      Palpations: Abdomen is soft.      Tenderness: There is no abdominal tenderness. There is no guarding or rebound.   Musculoskeletal:         General: Normal range of motion.      Cervical back: Normal range of motion and neck supple. Tenderness present. No rigidity.      Comments: LLE walking boot w/ good distal perfusion   Lymphadenopathy:      Cervical: No cervical adenopathy.   Skin:     General: Skin is warm.      Capillary Refill: Capillary refill takes less than 2 seconds.   Neurological:      General: No focal deficit present.      Mental Status: He is alert and oriented to person, place, and time. Mental status is at baseline.      GCS: GCS eye subscore is 4. GCS verbal subscore is 5. GCS motor subscore is 6.      Cranial Nerves: No cranial nerve deficit.      Sensory: No sensory deficit.      Motor: No weakness.      Coordination: Romberg sign negative.      Gait: Gait normal.   Psychiatric:         Mood and Affect: Mood is anxious.         Behavior:  Behavior is agitated.         Thought Content: Thought content normal.         Judgment: Judgment normal.         Procedures           ED Course                                           MDM  Number of Diagnoses or Management Options  Anxiety  Neck pain  Diagnosis management comments: This is a 66yoM presenting with concern for altered mental status. However, on arrival, the patient is speaking in full sentences and is oriented at this time. Patient arrived hemodynamically stable and was afebrile. Appears to be in pain from his chronic neck related issues. Neurological exam without any focal deficits that are new but he does have evidence of LUE weakness that he has had for a long time. Currently no overt concern for a dangerous emergent cause of the presenting complaint but differentials include, but are not limited to polypharmacy, toxidrome, CVA, ICH, dementia, anxiety, sepsis. No red flags for SAH (based on history). No evidence of meningismus on exam. Patient is afebrile, without nuchal rigidity, and no concerning skin findings. Very low concern for CNS infection. Presentation not consistent with other acute, emergent causes of AMS at this time. Plan to obtain cbc, cmp, ekg, troponin x 2, blood cultures, CT head, CTA head, CTA neck, CT chest, CT abdomen/pelvis, control pain, administer anxiolytics, and reassess.     The imaging and other workup was reviewed and found to have evidence of hyperglycemia. He was given IV insulin with a decrease in glucose levels. The patient was then reassessed and showed improvement in pain. D-dimer mildly elevated but CTA showed no PE and he is not short of breath at this time. His mental status has completely returned to baseline after pain medication and anxiolysis. He just states that he wants to go home. I went over the results of the CT scans with the patient and his wife at bedside in great length and answered all of their questions related to his transient vision loss,  hallucinations, pain, weakness, anxiety, and depression. I offered them admission to the hospital for further workup and a MRI which could further clear up his mental status lability. However, the patient, who is alert, oriented, and has full capacity to make decisions, decided to not be admitted as he just wants to go home and lay in his own bed. His wife also declined admission and states that they will just go home and follow up with their PCP as soon as possible. I reassessed the patient and discussed the findings of the work up so far. I explained my impression of the workup to him and addressed all of his questions regarding the emergency department evaluation, diagnosis, and treatment plan in plain and simple language that he was able to understand. I told them it is important to continue receiving wound care for the foot especially with his uncontrolled diabetes.    He voiced agreement with the plan of care so far and had no further questions. I told him that there is always some diagnostic uncertainty in the ER and that his work up, physical exam, and even his current presentation may not always reveal other underlying conditions. I also went over the fact that his condition may change or show itself after being discharged. He expressed understanding and agreed that there are reasonable limitations with the practice of emergency medicine.    I gave him return precautions and told him to return to the emergency department within 24 - 48hrs if he has any new, worsening, or concerning symptoms.     I told him that it is VERY IMPORTANT that he follows up (by calling to set up an appointment) with his primary care doctor within the next few days or as soon as reasonably possible so that he can be re-evaluated for improvement in his symptoms or for any other questions. He verbalized understanding of these instructions.     He was discharged in stable condition.              Amount and/or Complexity of Data  Reviewed  Clinical lab tests: reviewed and ordered  Tests in the radiology section of CPT®: ordered and reviewed  Decide to obtain previous medical records or to obtain history from someone other than the patient: yes    Risk of Complications, Morbidity, and/or Mortality  Presenting problems: moderate  Diagnostic procedures: moderate  Management options: moderate        Final diagnoses:   Anxiety   Neck pain       ED Disposition  ED Disposition     ED Disposition Condition Comment    Discharge Stable           Del Shetty MD  [address] [place] [address] DR DAWSON [address] [place] [address] [id number] [phone]    Call in 1 day  As needed, If symptoms worsen, For suture removal, For wound re-check         Medication List      No changes were made to your prescriptions during this visit.          Piter Perez MD  11/08/21 2282

## 2021-11-09 ENCOUNTER — HOME CARE VISIT (OUTPATIENT)
Dept: HOME HEALTH SERVICES | Facility: CLINIC | Age: 66
End: 2021-11-09

## 2021-11-09 VITALS
OXYGEN SATURATION: 98 % | TEMPERATURE: 97.7 F | HEART RATE: 72 BPM | DIASTOLIC BLOOD PRESSURE: 68 MMHG | SYSTOLIC BLOOD PRESSURE: 148 MMHG

## 2021-11-09 PROCEDURE — G0299 HHS/HOSPICE OF RN EA 15 MIN: HCPCS

## 2021-11-10 ENCOUNTER — HOME CARE VISIT (OUTPATIENT)
Dept: HOME HEALTH SERVICES | Facility: CLINIC | Age: 66
End: 2021-11-10

## 2021-11-10 VITALS
HEART RATE: 92 BPM | TEMPERATURE: 98.1 F | SYSTOLIC BLOOD PRESSURE: 146 MMHG | OXYGEN SATURATION: 95 % | RESPIRATION RATE: 16 BRPM | DIASTOLIC BLOOD PRESSURE: 82 MMHG

## 2021-11-10 PROCEDURE — G0299 HHS/HOSPICE OF RN EA 15 MIN: HCPCS

## 2021-11-11 ENCOUNTER — HOME CARE VISIT (OUTPATIENT)
Dept: HOME HEALTH SERVICES | Facility: CLINIC | Age: 66
End: 2021-11-11

## 2021-11-11 VITALS
HEART RATE: 88 BPM | TEMPERATURE: 98.9 F | DIASTOLIC BLOOD PRESSURE: 84 MMHG | SYSTOLIC BLOOD PRESSURE: 168 MMHG | RESPIRATION RATE: 18 BRPM | OXYGEN SATURATION: 96 %

## 2021-11-11 LAB
BACTERIA SPEC AEROBE CULT: NORMAL
BACTERIA SPEC AEROBE CULT: NORMAL

## 2021-11-11 PROCEDURE — G0299 HHS/HOSPICE OF RN EA 15 MIN: HCPCS

## 2021-11-16 ENCOUNTER — HOME CARE VISIT (OUTPATIENT)
Dept: HOME HEALTH SERVICES | Facility: CLINIC | Age: 66
End: 2021-11-16

## 2021-11-16 PROCEDURE — G0299 HHS/HOSPICE OF RN EA 15 MIN: HCPCS

## 2021-11-17 ENCOUNTER — HOME CARE VISIT (OUTPATIENT)
Dept: HOME HEALTH SERVICES | Facility: CLINIC | Age: 66
End: 2021-11-17

## 2021-11-17 VITALS
SYSTOLIC BLOOD PRESSURE: 140 MMHG | TEMPERATURE: 98.4 F | OXYGEN SATURATION: 95 % | RESPIRATION RATE: 18 BRPM | HEART RATE: 70 BPM | DIASTOLIC BLOOD PRESSURE: 76 MMHG

## 2021-11-17 VITALS
DIASTOLIC BLOOD PRESSURE: 80 MMHG | SYSTOLIC BLOOD PRESSURE: 148 MMHG | RESPIRATION RATE: 18 BRPM | OXYGEN SATURATION: 96 % | HEART RATE: 67 BPM | TEMPERATURE: 97.6 F

## 2021-11-17 PROCEDURE — G0299 HHS/HOSPICE OF RN EA 15 MIN: HCPCS

## 2021-11-17 NOTE — HOME HEALTH
PLAN FOR NEXT VISIT: LABS/WOUND CARE  FOCUS OF CARE/SKILL NEEDED:SN SERVICES FOR EDUCATION REGARDING PICC LINE.SN ENFORCED NOT PUT TAPE ON LINE AND TO KEEP CAPPED WHEN NOT IN USE.PATIENT V/U UTILIZING TEACHBACK METHOD.WOUND VAC TO CHANGED ON THURSDAYS.  TEACHING/INTERVENTIONS:TEACHING GIVEN WOUND VAC COMPLICATIONS AND TO REPORT TO SN WHEN NEEDED.PATIENT NEEDS REINFORCEMENT ON KEEPING PICC LINE CLEAN  HOME SAFETY ISSUES:VERY CLUTTERED ENVIRONMENT WITH MULTIPLE DOGS AND ANIMALS INSIDE HOME  D/C PLAN:WHEN PATIENT HAS REACHED ALL GOALS  PHYSICIAN CONTACT:NO  INSURANCE CHANGES:NO  MEDICATION KNOWLEDGE:  Have there been any changes to patient medications?YES, PATIENT IS UNSURE OF WHAT MEDICATIONS HAVE CHANGED, SN TO CALL SPOUSE TO CLARIFY WHEAT MEDICATIONS HAVE BEEN CHANGED  Is pt aware of purpose of each medication?NO, EDUCATION GIVEN  Is pt aware of side effects of each medication?EDUCATION GIVEN  Has the pt had any adverse side effects to medications? NO

## 2021-11-18 ENCOUNTER — OFFICE VISIT (OUTPATIENT)
Dept: ENDOCRINOLOGY | Facility: CLINIC | Age: 66
End: 2021-11-18

## 2021-11-18 ENCOUNTER — TRANSCRIBE ORDERS (OUTPATIENT)
Dept: ADMINISTRATIVE | Facility: HOSPITAL | Age: 66
End: 2021-11-18

## 2021-11-18 VITALS
OXYGEN SATURATION: 99 % | WEIGHT: 315 LBS | BODY MASS INDEX: 42.66 KG/M2 | HEART RATE: 73 BPM | DIASTOLIC BLOOD PRESSURE: 64 MMHG | HEIGHT: 72 IN | SYSTOLIC BLOOD PRESSURE: 124 MMHG

## 2021-11-18 DIAGNOSIS — E78.2 MIXED HYPERLIPIDEMIA: ICD-10-CM

## 2021-11-18 DIAGNOSIS — Z79.4 TYPE 2 DIABETES MELLITUS WITH HYPERGLYCEMIA, WITH LONG-TERM CURRENT USE OF INSULIN (HCC): Primary | ICD-10-CM

## 2021-11-18 DIAGNOSIS — E11.65 TYPE 2 DIABETES MELLITUS WITH HYPERGLYCEMIA, WITH LONG-TERM CURRENT USE OF INSULIN (HCC): Primary | ICD-10-CM

## 2021-11-18 DIAGNOSIS — E55.9 VITAMIN D DEFICIENCY: ICD-10-CM

## 2021-11-18 DIAGNOSIS — I10 ESSENTIAL HYPERTENSION: ICD-10-CM

## 2021-11-18 DIAGNOSIS — D64.9 ANEMIA, UNSPECIFIED TYPE: Primary | ICD-10-CM

## 2021-11-18 LAB
EXPIRATION DATE: ABNORMAL
HBA1C MFR BLD: 9.1 %
Lab: 844

## 2021-11-18 PROCEDURE — 83036 HEMOGLOBIN GLYCOSYLATED A1C: CPT | Performed by: NURSE PRACTITIONER

## 2021-11-18 PROCEDURE — 99214 OFFICE O/P EST MOD 30 MIN: CPT | Performed by: NURSE PRACTITIONER

## 2021-11-18 RX ORDER — DULAGLUTIDE 1.5 MG/.5ML
1.5 INJECTION, SOLUTION SUBCUTANEOUS WEEKLY
Qty: 6 ML | Refills: 11 | Status: ON HOLD | OUTPATIENT
Start: 2021-11-18 | End: 2023-01-20

## 2021-11-18 NOTE — PROGRESS NOTES
"Chief Complaint  Diabetes    Subjective          Erick Luong presents to [facility] ENDO[facility]OLOGY  History of Present Illness     In office visit       Referring provider      Del Shetty MD     66-year-old male presents for follow-up    Reason diabetes mellitus type 2    Diagnosed at 24    Timing constant    Quality not controlled    Severity is high         Lab Results   Component Value Date    HGBA1C 9.1 11/18/2021             Macrovascular complication -  CAD, CABG times 4      Microvascular complications --neuropathy, CKD stage IV     5th  Toe left foot --amputated         Current regimen --- insulin         Current glucose monitoring device -- fingerstick's      Blood glucose readings      Checks 4 times daily         States several goal     Was having lows so decreased the insulin           Review of Systems - General ROS: negative            Objective   Vital Signs:   /64   Pulse 73   Ht 182.9 cm (72\")   Wt (!) 157 kg (346 lb 12.8 oz)   SpO2 99%   BMI 47.03 kg/m²     Physical Exam  Constitutional:       Appearance: Normal appearance.   Cardiovascular:      Rate and Rhythm: Regular rhythm.      Heart sounds: Normal heart sounds.   Pulmonary:      Breath sounds: Normal breath sounds.   Musculoskeletal:      Cervical back: Normal range of motion.      Comments: Wound vac on left foot    Neurological:      Mental Status: He is alert.        Result Review :   The following data was reviewed by: SUSAN Farah on 11/18/2021:  Common labs    Common Labsle 8/23/21 8/23/21 10/28/21 10/28/21 11/7/21 11/7/21    2256 2256 1209 1209 0014 0014   Glucose  142 (A)  209 (A)  424 (A)   BUN  45 (A)  26 (A)  33 (A)   Creatinine  2.36 (A)  1.64 (A)  1.66 (A)   eGFR Non  Am  28 (A)  42 (A)  42 (A)   Sodium  137  136  136   Potassium  5.4 (A)  5.4 (A)  4.0   Chloride  104  102  101   Calcium  9.1  9.0  8.7   Albumin  4.10  3.40 (A)  3.40 (A)   Total Bilirubin  0.2  " 0.3  0.3   Alkaline Phosphatase  69  125 (A)  94   AST (SGOT)  16  20  13   ALT (SGPT)  15  25  13   WBC 10.34  9.16  8.08    Hemoglobin 11.0 (A)  10.1 (A)  9.8 (A)    Hematocrit 32.3 (A)  31.4 (A)  30.3 (A)    Platelets 229  395  300    (A) Abnormal value                      Assessment and Plan    Diagnoses and all orders for this visit:    1. Type 2 diabetes mellitus with hyperglycemia, with long-term current use of insulin (HCC) (Primary)  -     POC Glycosylated Hemoglobin (Hb A1C)    2. Essential hypertension    3. Vitamin D deficiency    4. Mixed hyperlipidemia    Other orders  -     Dulaglutide (Trulicity) 1.5 MG/0.5ML solution pen-injector; Inject 1.5 mg under the skin into the appropriate area as directed 1 (One) Time Per Week.  Dispense: 12 pen; Refill: 11             Glycemic management:      Diabetes mellitus type 2    Lab Results   Component Value Date    HGBA1C 9.1 11/18/2021           Humulin U 500             taking 50 units 30 minutes before breakfast          take 50 units 30 minutes before supper -decrease to 40 units due to having lows      If you have a low bg in the night decrease to 65     Gives 20 units for lunch!                Trulicity 1.5 mg weekly         Side effects can be nausea     If nauseated while eating, eat less,      If vomiting or abdominal pain stop medication and call office            Afreeza not affordable                         Lipid management         Hyperlipidemia      Crestor 40 mg one at night            Blood pressure management:     Hypertension      Taking bumex 1mg         Microvascular complications     Neuropathy     Taking gabapentin 300 mg tid            Last eye exam -- 2020,        Cataract surgery     Left foot 5th toe amputated due to injury                 Weight management        Obesity           Patient's Body mass index is 47.03 kg/m². indicating that he is obese (BMI >30). Obesity-related health conditions include the following: diabetes mellitus.  Obesity is unchanged. BMI is is above average; BMI management plan is completed. We discussed portion control and increasing exercise..            Thyroid health       Lab Results   Component Value Date    TSH 2.050 11/07/2021             Follow Up   Return in about 3 months (around 2/18/2022) for Recheck.  Patient was given instructions and counseling regarding his condition or for health maintenance advice. Please see specific information pulled into the AVS if appropriate.         This document has been electronically signed by SUSAN Farah on November 18, 2021 11:45 CST.

## 2021-11-19 ENCOUNTER — HOME CARE VISIT (OUTPATIENT)
Dept: HOME HEALTH SERVICES | Facility: CLINIC | Age: 66
End: 2021-11-19

## 2021-11-19 VITALS
RESPIRATION RATE: 18 BRPM | OXYGEN SATURATION: 98 % | HEART RATE: 66 BPM | DIASTOLIC BLOOD PRESSURE: 82 MMHG | SYSTOLIC BLOOD PRESSURE: 142 MMHG | TEMPERATURE: 97.5 F

## 2021-11-19 PROCEDURE — G0299 HHS/HOSPICE OF RN EA 15 MIN: HCPCS

## 2021-11-24 ENCOUNTER — HOME CARE VISIT (OUTPATIENT)
Dept: HOME HEALTH SERVICES | Facility: CLINIC | Age: 66
End: 2021-11-24

## 2021-11-24 VITALS
DIASTOLIC BLOOD PRESSURE: 80 MMHG | HEART RATE: 80 BPM | TEMPERATURE: 98.2 F | RESPIRATION RATE: 16 BRPM | SYSTOLIC BLOOD PRESSURE: 148 MMHG | OXYGEN SATURATION: 97 %

## 2021-11-24 PROCEDURE — G0299 HHS/HOSPICE OF RN EA 15 MIN: HCPCS

## 2021-11-24 PROCEDURE — G0300 HHS/HOSPICE OF LPN EA 15 MIN: HCPCS

## 2021-11-25 ENCOUNTER — HOME CARE VISIT (OUTPATIENT)
Dept: HOME HEALTH SERVICES | Facility: CLINIC | Age: 66
End: 2021-11-25

## 2021-11-25 VITALS
SYSTOLIC BLOOD PRESSURE: 142 MMHG | RESPIRATION RATE: 18 BRPM | TEMPERATURE: 98.1 F | HEART RATE: 85 BPM | DIASTOLIC BLOOD PRESSURE: 80 MMHG | OXYGEN SATURATION: 95 %

## 2021-11-25 NOTE — CASE COMMUNICATION
Patient missed a SN visit from [facility] on 11/25/21.     Reason: Pt's wife refused for the pt to be seen due to the holiday and that the pt no longer has a wound vac and the pt's family can perform wound care.    For your records only.   As per home health protocol, MD must be notified of missed/cancelled visits; therefore the prescribed frequency was not met.

## 2021-11-26 NOTE — HOME HEALTH
Unsuccessful attempt to draw labs x 1 stick with 23 g butterfly. Patient has zero palpable veins. Patient sates he is a very hard stick. Attempted to notify ordering physician, office closed on Wednesday at 16:00.

## 2021-11-30 ENCOUNTER — HOME CARE VISIT (OUTPATIENT)
Dept: HOME HEALTH SERVICES | Facility: CLINIC | Age: 66
End: 2021-11-30

## 2021-12-02 ENCOUNTER — HOME CARE VISIT (OUTPATIENT)
Dept: HOME HEALTH SERVICES | Facility: CLINIC | Age: 66
End: 2021-12-02

## 2021-12-07 ENCOUNTER — HOME CARE VISIT (OUTPATIENT)
Dept: HOME HEALTH SERVICES | Facility: CLINIC | Age: 66
End: 2021-12-07

## 2021-12-08 ENCOUNTER — HOSPITAL ENCOUNTER (INPATIENT)
Facility: HOSPITAL | Age: 66
LOS: 3 days | Discharge: HOME | End: 2021-12-11
Attending: EMERGENCY MEDICINE | Admitting: FAMILY MEDICINE

## 2021-12-08 ENCOUNTER — APPOINTMENT (OUTPATIENT)
Dept: GENERAL RADIOLOGY | Facility: HOSPITAL | Age: 66
End: 2021-12-08

## 2021-12-08 DIAGNOSIS — R60.1 ANASARCA: Primary | ICD-10-CM

## 2021-12-08 LAB
ALBUMIN SERPL-MCNC: 3.7 G/DL (ref 3.5–5.2)
ALBUMIN/GLOB SERPL: 1.2 G/DL
ALP SERPL-CCNC: 109 U/L (ref 39–117)
ALT SERPL W P-5'-P-CCNC: 11 U/L (ref 1–41)
ANION GAP SERPL CALCULATED.3IONS-SCNC: 12 MMOL/L (ref 5–15)
AST SERPL-CCNC: 15 U/L (ref 1–40)
BACTERIA UR QL AUTO: NORMAL /HPF
BASOPHILS # BLD AUTO: 0.03 10*3/MM3 (ref 0–0.2)
BASOPHILS NFR BLD AUTO: 0.4 % (ref 0–1.5)
BILIRUB SERPL-MCNC: 0.4 MG/DL (ref 0–1.2)
BILIRUB UR QL STRIP: NEGATIVE
BUN SERPL-MCNC: 36 MG/DL (ref 8–23)
BUN/CREAT SERPL: 17.2 (ref 7–25)
CALCIUM SPEC-SCNC: 8.6 MG/DL (ref 8.6–10.5)
CHLORIDE SERPL-SCNC: 98 MMOL/L (ref 98–107)
CLARITY UR: CLEAR
CO2 SERPL-SCNC: 27 MMOL/L (ref 22–29)
COLOR UR: YELLOW
CREAT SERPL-MCNC: 2.09 MG/DL (ref 0.76–1.27)
DEPRECATED RDW RBC AUTO: 45 FL (ref 37–54)
EOSINOPHIL # BLD AUTO: 0.34 10*3/MM3 (ref 0–0.4)
EOSINOPHIL NFR BLD AUTO: 4.9 % (ref 0.3–6.2)
ERYTHROCYTE [DISTWIDTH] IN BLOOD BY AUTOMATED COUNT: 13.8 % (ref 12.3–15.4)
GFR SERPL CREATININE-BSD FRML MDRD: 32 ML/MIN/1.73
GLOBULIN UR ELPH-MCNC: 3 GM/DL
GLUCOSE SERPL-MCNC: 276 MG/DL (ref 65–99)
GLUCOSE UR STRIP-MCNC: ABNORMAL MG/DL
HCT VFR BLD AUTO: 29.7 % (ref 37.5–51)
HGB BLD-MCNC: 9.1 G/DL (ref 13–17.7)
HGB UR QL STRIP.AUTO: NEGATIVE
HOLD SPECIMEN: NORMAL
HOLD SPECIMEN: NORMAL
IMM GRANULOCYTES # BLD AUTO: 0.04 10*3/MM3 (ref 0–0.05)
IMM GRANULOCYTES NFR BLD AUTO: 0.6 % (ref 0–0.5)
KETONES UR QL STRIP: NEGATIVE
LEUKOCYTE ESTERASE UR QL STRIP.AUTO: NEGATIVE
LYMPHOCYTES # BLD AUTO: 1.25 10*3/MM3 (ref 0.7–3.1)
LYMPHOCYTES NFR BLD AUTO: 17.9 % (ref 19.6–45.3)
MAGNESIUM SERPL-MCNC: 2.2 MG/DL (ref 1.6–2.4)
MCH RBC QN AUTO: 27.4 PG (ref 26.6–33)
MCHC RBC AUTO-ENTMCNC: 30.6 G/DL (ref 31.5–35.7)
MCV RBC AUTO: 89.5 FL (ref 79–97)
MONOCYTES # BLD AUTO: 0.72 10*3/MM3 (ref 0.1–0.9)
MONOCYTES NFR BLD AUTO: 10.3 % (ref 5–12)
NEUTROPHILS NFR BLD AUTO: 4.61 10*3/MM3 (ref 1.7–7)
NEUTROPHILS NFR BLD AUTO: 65.9 % (ref 42.7–76)
NITRITE UR QL STRIP: NEGATIVE
NRBC BLD AUTO-RTO: 0 /100 WBC (ref 0–0.2)
NT-PROBNP SERPL-MCNC: 1308 PG/ML (ref 0–900)
PH UR STRIP.AUTO: 6.5 [PH] (ref 5–8)
PLATELET # BLD AUTO: 206 10*3/MM3 (ref 140–450)
PMV BLD AUTO: 10.6 FL (ref 6–12)
POTASSIUM SERPL-SCNC: 4.1 MMOL/L (ref 3.5–5.2)
PROT SERPL-MCNC: 6.7 G/DL (ref 6–8.5)
PROT UR QL STRIP: ABNORMAL
RBC # BLD AUTO: 3.32 10*6/MM3 (ref 4.14–5.8)
RBC # UR STRIP: NORMAL /HPF
REF LAB TEST METHOD: NORMAL
SODIUM SERPL-SCNC: 137 MMOL/L (ref 136–145)
SP GR UR STRIP: 1.02 (ref 1–1.03)
SQUAMOUS #/AREA URNS HPF: NORMAL /HPF
TROPONIN T SERPL-MCNC: <0.01 NG/ML (ref 0–0.03)
UROBILINOGEN UR QL STRIP: ABNORMAL
WBC # UR STRIP: NORMAL /HPF
WBC NRBC COR # BLD: 6.99 10*3/MM3 (ref 3.4–10.8)
WHOLE BLOOD HOLD SPECIMEN: NORMAL
WHOLE BLOOD HOLD SPECIMEN: NORMAL

## 2021-12-08 PROCEDURE — 71045 X-RAY EXAM CHEST 1 VIEW: CPT

## 2021-12-08 PROCEDURE — 81001 URINALYSIS AUTO W/SCOPE: CPT | Performed by: EMERGENCY MEDICINE

## 2021-12-08 PROCEDURE — 93005 ELECTROCARDIOGRAM TRACING: CPT | Performed by: EMERGENCY MEDICINE

## 2021-12-08 PROCEDURE — 87635 SARS-COV-2 COVID-19 AMP PRB: CPT | Performed by: EMERGENCY MEDICINE

## 2021-12-08 PROCEDURE — 83735 ASSAY OF MAGNESIUM: CPT | Performed by: EMERGENCY MEDICINE

## 2021-12-08 PROCEDURE — 93010 ELECTROCARDIOGRAM REPORT: CPT | Performed by: INTERNAL MEDICINE

## 2021-12-08 PROCEDURE — 99284 EMERGENCY DEPT VISIT MOD MDM: CPT

## 2021-12-08 PROCEDURE — 84484 ASSAY OF TROPONIN QUANT: CPT | Performed by: EMERGENCY MEDICINE

## 2021-12-08 PROCEDURE — 85025 COMPLETE CBC W/AUTO DIFF WBC: CPT | Performed by: EMERGENCY MEDICINE

## 2021-12-08 PROCEDURE — 80053 COMPREHEN METABOLIC PANEL: CPT | Performed by: EMERGENCY MEDICINE

## 2021-12-08 PROCEDURE — 83880 ASSAY OF NATRIURETIC PEPTIDE: CPT | Performed by: EMERGENCY MEDICINE

## 2021-12-08 RX ORDER — BUMETANIDE 0.25 MG/ML
2 INJECTION INTRAMUSCULAR; INTRAVENOUS ONCE
Status: COMPLETED | OUTPATIENT
Start: 2021-12-08 | End: 2021-12-08

## 2021-12-08 RX ORDER — SODIUM CHLORIDE 0.9 % (FLUSH) 0.9 %
10 SYRINGE (ML) INJECTION AS NEEDED
Status: DISCONTINUED | OUTPATIENT
Start: 2021-12-08 | End: 2021-12-11 | Stop reason: HOSPADM

## 2021-12-08 RX ADMIN — BUMETANIDE 2 MG: 0.25 INJECTION, SOLUTION INTRAMUSCULAR; INTRAVENOUS at 23:48

## 2021-12-09 ENCOUNTER — HOME CARE VISIT (OUTPATIENT)
Dept: HOME HEALTH SERVICES | Facility: CLINIC | Age: 66
End: 2021-12-09

## 2021-12-09 PROBLEM — N17.9 ACUTE KIDNEY INJURY SUPERIMPOSED ON CHRONIC KIDNEY DISEASE: Status: ACTIVE | Noted: 2021-12-09

## 2021-12-09 PROBLEM — N18.9 ACUTE KIDNEY INJURY SUPERIMPOSED ON CHRONIC KIDNEY DISEASE: Status: ACTIVE | Noted: 2021-12-09

## 2021-12-09 LAB
ANION GAP SERPL CALCULATED.3IONS-SCNC: 10 MMOL/L (ref 5–15)
BASOPHILS # BLD AUTO: 0.02 10*3/MM3 (ref 0–0.2)
BASOPHILS NFR BLD AUTO: 0.3 % (ref 0–1.5)
BUN SERPL-MCNC: 37 MG/DL (ref 8–23)
BUN/CREAT SERPL: 18.4 (ref 7–25)
CALCIUM SPEC-SCNC: 8.6 MG/DL (ref 8.6–10.5)
CHLORIDE SERPL-SCNC: 99 MMOL/L (ref 98–107)
CO2 SERPL-SCNC: 29 MMOL/L (ref 22–29)
CREAT SERPL-MCNC: 2.01 MG/DL (ref 0.76–1.27)
DEPRECATED RDW RBC AUTO: 45.1 FL (ref 37–54)
EOSINOPHIL # BLD AUTO: 0.22 10*3/MM3 (ref 0–0.4)
EOSINOPHIL NFR BLD AUTO: 3 % (ref 0.3–6.2)
ERYTHROCYTE [DISTWIDTH] IN BLOOD BY AUTOMATED COUNT: 13.9 % (ref 12.3–15.4)
GFR SERPL CREATININE-BSD FRML MDRD: 34 ML/MIN/1.73
GLUCOSE BLDC GLUCOMTR-MCNC: 130 MG/DL (ref 70–130)
GLUCOSE BLDC GLUCOMTR-MCNC: 206 MG/DL (ref 70–130)
GLUCOSE BLDC GLUCOMTR-MCNC: 351 MG/DL (ref 70–130)
GLUCOSE BLDC GLUCOMTR-MCNC: 78 MG/DL (ref 70–130)
GLUCOSE SERPL-MCNC: 127 MG/DL (ref 65–99)
HCT VFR BLD AUTO: 30 % (ref 37.5–51)
HGB BLD-MCNC: 9.1 G/DL (ref 13–17.7)
HOLD SPECIMEN: NORMAL
IMM GRANULOCYTES # BLD AUTO: 0.05 10*3/MM3 (ref 0–0.05)
IMM GRANULOCYTES NFR BLD AUTO: 0.7 % (ref 0–0.5)
LYMPHOCYTES # BLD AUTO: 1.09 10*3/MM3 (ref 0.7–3.1)
LYMPHOCYTES NFR BLD AUTO: 14.9 % (ref 19.6–45.3)
MCH RBC QN AUTO: 27.2 PG (ref 26.6–33)
MCHC RBC AUTO-ENTMCNC: 30.3 G/DL (ref 31.5–35.7)
MCV RBC AUTO: 89.6 FL (ref 79–97)
MONOCYTES # BLD AUTO: 0.73 10*3/MM3 (ref 0.1–0.9)
MONOCYTES NFR BLD AUTO: 10 % (ref 5–12)
NEUTROPHILS NFR BLD AUTO: 5.21 10*3/MM3 (ref 1.7–7)
NEUTROPHILS NFR BLD AUTO: 71.1 % (ref 42.7–76)
NRBC BLD AUTO-RTO: 0 /100 WBC (ref 0–0.2)
PLATELET # BLD AUTO: 207 10*3/MM3 (ref 140–450)
PMV BLD AUTO: 11.2 FL (ref 6–12)
POTASSIUM SERPL-SCNC: 3.8 MMOL/L (ref 3.5–5.2)
QT INTERVAL: 366 MS
QTC INTERVAL: 435 MS
RBC # BLD AUTO: 3.35 10*6/MM3 (ref 4.14–5.8)
SARS-COV-2 RNA PNL SPEC NAA+PROBE: NOT DETECTED
SODIUM SERPL-SCNC: 138 MMOL/L (ref 136–145)
WBC NRBC COR # BLD: 7.32 10*3/MM3 (ref 3.4–10.8)

## 2021-12-09 PROCEDURE — 25010000002 ENOXAPARIN PER 10 MG: Performed by: FAMILY MEDICINE

## 2021-12-09 PROCEDURE — 94799 UNLISTED PULMONARY SVC/PX: CPT

## 2021-12-09 PROCEDURE — 82962 GLUCOSE BLOOD TEST: CPT

## 2021-12-09 PROCEDURE — 63710000001 INSULIN LISPRO (HUMAN) PER 5 UNITS: Performed by: FAMILY MEDICINE

## 2021-12-09 PROCEDURE — 36415 COLL VENOUS BLD VENIPUNCTURE: CPT | Performed by: FAMILY MEDICINE

## 2021-12-09 PROCEDURE — 63710000001 INSULIN DETEMIR PER 5 UNITS: Performed by: FAMILY MEDICINE

## 2021-12-09 PROCEDURE — 85025 COMPLETE CBC W/AUTO DIFF WBC: CPT | Performed by: FAMILY MEDICINE

## 2021-12-09 PROCEDURE — 94640 AIRWAY INHALATION TREATMENT: CPT

## 2021-12-09 PROCEDURE — 80048 BASIC METABOLIC PNL TOTAL CA: CPT | Performed by: FAMILY MEDICINE

## 2021-12-09 RX ORDER — TAMSULOSIN HYDROCHLORIDE 0.4 MG/1
0.4 CAPSULE ORAL DAILY
Status: DISCONTINUED | OUTPATIENT
Start: 2021-12-09 | End: 2021-12-11 | Stop reason: HOSPADM

## 2021-12-09 RX ORDER — CARVEDILOL 6.25 MG/1
6.25 TABLET ORAL 2 TIMES DAILY
Status: DISCONTINUED | OUTPATIENT
Start: 2021-12-09 | End: 2021-12-11 | Stop reason: HOSPADM

## 2021-12-09 RX ORDER — BUSPIRONE HYDROCHLORIDE 10 MG/1
10 TABLET ORAL 3 TIMES DAILY
Status: DISCONTINUED | OUTPATIENT
Start: 2021-12-09 | End: 2021-12-11 | Stop reason: HOSPADM

## 2021-12-09 RX ORDER — DULOXETIN HYDROCHLORIDE 30 MG/1
60 CAPSULE, DELAYED RELEASE ORAL DAILY
Status: DISCONTINUED | OUTPATIENT
Start: 2021-12-09 | End: 2021-12-11 | Stop reason: HOSPADM

## 2021-12-09 RX ORDER — CYCLOBENZAPRINE HCL 10 MG
5 TABLET ORAL 2 TIMES DAILY PRN
Status: DISCONTINUED | OUTPATIENT
Start: 2021-12-09 | End: 2021-12-11 | Stop reason: HOSPADM

## 2021-12-09 RX ORDER — DEXTROSE MONOHYDRATE 25 G/50ML
25 INJECTION, SOLUTION INTRAVENOUS
Status: DISCONTINUED | OUTPATIENT
Start: 2021-12-09 | End: 2021-12-11 | Stop reason: HOSPADM

## 2021-12-09 RX ORDER — SODIUM CHLORIDE 0.9 % (FLUSH) 0.9 %
10 SYRINGE (ML) INJECTION EVERY 12 HOURS SCHEDULED
Status: DISCONTINUED | OUTPATIENT
Start: 2021-12-09 | End: 2021-12-11 | Stop reason: HOSPADM

## 2021-12-09 RX ORDER — GABAPENTIN 300 MG/1
600 CAPSULE ORAL 3 TIMES DAILY
Status: DISCONTINUED | OUTPATIENT
Start: 2021-12-09 | End: 2021-12-11 | Stop reason: HOSPADM

## 2021-12-09 RX ORDER — HYDROCODONE BITARTRATE AND ACETAMINOPHEN 7.5; 325 MG/1; MG/1
1 TABLET ORAL ONCE
Status: COMPLETED | OUTPATIENT
Start: 2021-12-09 | End: 2021-12-09

## 2021-12-09 RX ORDER — SUCRALFATE 1 G/1
1 TABLET ORAL
Status: DISCONTINUED | OUTPATIENT
Start: 2021-12-09 | End: 2021-12-09

## 2021-12-09 RX ORDER — CLINDAMYCIN HYDROCHLORIDE 300 MG/1
300 CAPSULE ORAL 3 TIMES DAILY
Status: ON HOLD | COMMUNITY
End: 2021-12-09

## 2021-12-09 RX ORDER — TRAZODONE HYDROCHLORIDE 100 MG/1
100 TABLET ORAL NIGHTLY
Status: DISCONTINUED | OUTPATIENT
Start: 2021-12-09 | End: 2021-12-11 | Stop reason: HOSPADM

## 2021-12-09 RX ORDER — NICOTINE POLACRILEX 4 MG
15 LOZENGE BUCCAL
Status: DISCONTINUED | OUTPATIENT
Start: 2021-12-09 | End: 2021-12-11 | Stop reason: HOSPADM

## 2021-12-09 RX ORDER — IPRATROPIUM BROMIDE AND ALBUTEROL SULFATE 2.5; .5 MG/3ML; MG/3ML
3 SOLUTION RESPIRATORY (INHALATION) EVERY 6 HOURS
Status: DISCONTINUED | OUTPATIENT
Start: 2021-12-09 | End: 2021-12-10

## 2021-12-09 RX ORDER — SODIUM CHLORIDE 0.9 % (FLUSH) 0.9 %
10 SYRINGE (ML) INJECTION AS NEEDED
Status: DISCONTINUED | OUTPATIENT
Start: 2021-12-09 | End: 2021-12-11 | Stop reason: HOSPADM

## 2021-12-09 RX ORDER — ROSUVASTATIN CALCIUM 20 MG/1
40 TABLET, COATED ORAL NIGHTLY
Status: DISCONTINUED | OUTPATIENT
Start: 2021-12-09 | End: 2021-12-11 | Stop reason: HOSPADM

## 2021-12-09 RX ORDER — DONEPEZIL HYDROCHLORIDE 5 MG/1
5 TABLET, FILM COATED ORAL DAILY
Status: DISCONTINUED | OUTPATIENT
Start: 2021-12-09 | End: 2021-12-11 | Stop reason: HOSPADM

## 2021-12-09 RX ORDER — PANTOPRAZOLE SODIUM 40 MG/1
40 TABLET, DELAYED RELEASE ORAL
Status: DISCONTINUED | OUTPATIENT
Start: 2021-12-09 | End: 2021-12-11 | Stop reason: HOSPADM

## 2021-12-09 RX ORDER — ACETAMINOPHEN 325 MG/1
650 TABLET ORAL EVERY 4 HOURS PRN
Status: DISCONTINUED | OUTPATIENT
Start: 2021-12-09 | End: 2021-12-11 | Stop reason: HOSPADM

## 2021-12-09 RX ORDER — ASPIRIN 81 MG/1
81 TABLET ORAL DAILY
Status: DISCONTINUED | OUTPATIENT
Start: 2021-12-09 | End: 2021-12-11 | Stop reason: HOSPADM

## 2021-12-09 RX ADMIN — ENOXAPARIN SODIUM 40 MG: 40 INJECTION SUBCUTANEOUS at 21:09

## 2021-12-09 RX ADMIN — SUCRALFATE 1 G: 1 TABLET ORAL at 11:20

## 2021-12-09 RX ADMIN — INSULIN DETEMIR 50 UNITS: 100 INJECTION, SOLUTION SUBCUTANEOUS at 08:24

## 2021-12-09 RX ADMIN — TRAZODONE HYDROCHLORIDE 100 MG: 100 TABLET ORAL at 21:08

## 2021-12-09 RX ADMIN — INSULIN LISPRO 7 UNITS: 100 INJECTION, SOLUTION INTRAVENOUS; SUBCUTANEOUS at 17:12

## 2021-12-09 RX ADMIN — BUSPIRONE HYDROCHLORIDE 10 MG: 10 TABLET ORAL at 21:08

## 2021-12-09 RX ADMIN — INSULIN DETEMIR 50 UNITS: 100 INJECTION, SOLUTION SUBCUTANEOUS at 21:14

## 2021-12-09 RX ADMIN — TAMSULOSIN HYDROCHLORIDE 0.4 MG: 0.4 CAPSULE ORAL at 08:23

## 2021-12-09 RX ADMIN — ACETAMINOPHEN 650 MG: 325 TABLET, FILM COATED ORAL at 11:20

## 2021-12-09 RX ADMIN — CARVEDILOL 6.25 MG: 6.25 TABLET, FILM COATED ORAL at 08:23

## 2021-12-09 RX ADMIN — BUMETANIDE 1 MG/HR: 0.25 INJECTION, SOLUTION INTRAMUSCULAR; INTRAVENOUS at 21:08

## 2021-12-09 RX ADMIN — CARVEDILOL 6.25 MG: 6.25 TABLET, FILM COATED ORAL at 21:08

## 2021-12-09 RX ADMIN — DONEPEZIL HYDROCHLORIDE 5 MG: 5 TABLET, FILM COATED ORAL at 08:23

## 2021-12-09 RX ADMIN — BUMETANIDE 1 MG/HR: 0.25 INJECTION, SOLUTION INTRAMUSCULAR; INTRAVENOUS at 01:27

## 2021-12-09 RX ADMIN — IPRATROPIUM BROMIDE AND ALBUTEROL SULFATE 3 ML: 2.5; .5 SOLUTION RESPIRATORY (INHALATION) at 18:35

## 2021-12-09 RX ADMIN — SODIUM CHLORIDE, PRESERVATIVE FREE 10 ML: 5 INJECTION INTRAVENOUS at 08:23

## 2021-12-09 RX ADMIN — BUMETANIDE 1 MG/HR: 0.25 INJECTION, SOLUTION INTRAMUSCULAR; INTRAVENOUS at 10:20

## 2021-12-09 RX ADMIN — DULOXETINE HYDROCHLORIDE 60 MG: 30 CAPSULE, DELAYED RELEASE ORAL at 08:24

## 2021-12-09 RX ADMIN — ACETAMINOPHEN 650 MG: 325 TABLET, FILM COATED ORAL at 03:16

## 2021-12-09 RX ADMIN — IPRATROPIUM BROMIDE AND ALBUTEROL SULFATE 3 ML: 2.5; .5 SOLUTION RESPIRATORY (INHALATION) at 23:55

## 2021-12-09 RX ADMIN — PANTOPRAZOLE SODIUM 40 MG: 40 TABLET, DELAYED RELEASE ORAL at 08:23

## 2021-12-09 RX ADMIN — BUSPIRONE HYDROCHLORIDE 10 MG: 10 TABLET ORAL at 08:24

## 2021-12-09 RX ADMIN — GABAPENTIN 600 MG: 300 CAPSULE ORAL at 21:08

## 2021-12-09 RX ADMIN — GABAPENTIN 600 MG: 300 CAPSULE ORAL at 08:23

## 2021-12-09 RX ADMIN — ACETAMINOPHEN 650 MG: 325 TABLET, FILM COATED ORAL at 15:52

## 2021-12-09 RX ADMIN — GABAPENTIN 600 MG: 300 CAPSULE ORAL at 13:37

## 2021-12-09 RX ADMIN — ASPIRIN 81 MG: 81 TABLET, COATED ORAL at 08:23

## 2021-12-09 RX ADMIN — IPRATROPIUM BROMIDE AND ALBUTEROL SULFATE 3 ML: 2.5; .5 SOLUTION RESPIRATORY (INHALATION) at 14:59

## 2021-12-09 RX ADMIN — INSULIN LISPRO 8 UNITS: 100 INJECTION, SOLUTION INTRAVENOUS; SUBCUTANEOUS at 21:09

## 2021-12-09 RX ADMIN — HYDROCODONE BITARTRATE AND ACETAMINOPHEN 1 TABLET: 7.5; 325 TABLET ORAL at 21:08

## 2021-12-09 RX ADMIN — BUSPIRONE HYDROCHLORIDE 10 MG: 10 TABLET ORAL at 15:51

## 2021-12-09 RX ADMIN — ENOXAPARIN SODIUM 40 MG: 40 INJECTION SUBCUTANEOUS at 08:23

## 2021-12-09 RX ADMIN — ROSUVASTATIN CALCIUM 40 MG: 20 TABLET, FILM COATED ORAL at 21:08

## 2021-12-09 RX ADMIN — CARVEDILOL 6.25 MG: 6.25 TABLET, FILM COATED ORAL at 03:14

## 2021-12-09 RX ADMIN — CYCLOBENZAPRINE 5 MG: 10 TABLET, FILM COATED ORAL at 11:20

## 2021-12-09 RX ADMIN — INSULIN LISPRO 4 UNITS: 100 INJECTION, SOLUTION INTRAVENOUS; SUBCUTANEOUS at 11:21

## 2021-12-09 NOTE — CONSULTS
Nephrology ([facility]) Consult Note      Patient:  Erick Luong  YOB: 1955  Date of Service: 12/9/2021  MRN: [id number]   Acct: [id number]   Primary Care Physician: Del Shetty MD  Advance Directive:   Code Status and Medical Interventions:   Ordered at: 12/08/21 2241     Code Status (Patient has no pulse and is not breathing):    CPR (Attempt to Resuscitate)     Medical Interventions (Patient has pulse or is breathing):    Full Support     Admit Date: 12/8/2021       Hospital Day: 1  Referring Provider: Garrett Alicia,*      Patient personally seen and examined.  Complete chart including Consults, Notes, Operative Reports, Labs, Cardiology, and Radiology studies reviewed as able.        Subjective:  Erick Luong is a 66 y.o. male for whom we were consulted for evaluation and treatment of chronic kidney disease stage 3b with anasarca. Patient well known to our office due to multiple hospitalizations for similar issues. Baseline CKD stage 3b, baseline creatinine 1.6.  Patient was seen in our office earlier this week by leo DAVIS. Patient noted to be significantly volume overloaded at that time, weight had increased well over 20 pounds. Creatinine was 2.0.  Patient had been taking Bumex 1 mg BID and Metolazone was added.  Presented to [facility] ER on 12/08 with increased dyspnea, edema/anasarca.  Initial creatinine in ER was 2.09. placed on Bumex drip. Currently patient is feeling a little better, maintaining oxygen saturation above 95% on room air. Abdomen and lower extremities still very edematous. Urine output nonoliguric.    Allergies:  Cefepime, Bactrim [sulfamethoxazole-trimethoprim], Clindamycin, Vancomycin, and Metronidazole    Home Meds:  Medications Prior to Admission   Medication Sig Dispense Refill Last Dose   • bumetanide (BUMEX) 1 MG tablet Take 1 tablet by mouth 2 (Two) Times a Day. 60 tablet 6 12/8/2021 at Unknown time   • busPIRone  (BUSPAR) 10 MG tablet Take 1 tablet 3 times a day by oral route as needed. 45 tablet 2 12/8/2021 at Unknown time   • carvedilol (COREG) 6.25 MG tablet Take 1 tablet by mouth 2 (Two) Times a Day. 180 tablet 4 12/8/2021 at Unknown time   • colchicine 0.6 MG tablet Take 1 tablet by mouth 2 (two) times a day. 28 tablet 2 Past Month at Unknown time   • cyclobenzaprine (FLEXERIL) 5 MG tablet Take 1 tablet by mouth 2 (Two) Times a Day As Needed for muscle spasms 60 tablet 5 Past Month at Unknown time   • donepezil (ARICEPT) 5 MG tablet Take 1 tablet by mouth Daily. 90 tablet 4 12/8/2021 at Unknown time   • DULoxetine (CYMBALTA) 60 MG capsule Take 60 mg by mouth Daily.   12/8/2021 at Unknown time   • Finerenone (Kerendia) 10 MG tablet Take 10 mg by mouth Daily. 30 tablet 6 12/8/2021 at Unknown time   • gabapentin (NEURONTIN) 600 MG tablet Take 1 tablet by mouth 3 (Three) Times a Day. 90 tablet 2 12/8/2021 at Unknown time   • HYDROcodone-acetaminophen (NORCO) 7.5-325 MG per tablet Take 1 tablet by mouth 2 (Two) Times a Day As Needed. 45 tablet 0 12/8/2021 at Unknown time   • Insulin Lispro, 1 Unit Dial, (HumaLOG KwikPen) 100 UNIT/ML solution pen-injector Inject according to sliding scale:  glucose=151-200 use 2 UNITS; glucose=201-250 use 4 units; glucose=251-300 use 6 units; above 301 use 8 units 18 mL 11 12/8/2021 at Unknown time   • Insulin Regular Human, Conc, (HumuLIN R U-500 KwikPen) 500 UNIT/ML solution pen-injector CONCENTRATED injection Inject 120 units under the skin into the appropriate area as directed Every Morning with Breakfast  units Every Evening with Supper. 18 mL 11 12/8/2021 at Unknown time   • ipratropium-albuterol (DUO-NEB) 0.5-2.5 mg/3 ml nebulizer Take 3 mL by nebulization Every 6 (Six) Hours. 360 mL 0 Past Month at Unknown time   • irbesartan (AVAPRO) 300 MG tablet Take 1 tablet by mouth Daily. 90 tablet 1 12/8/2021 at Unknown time   • metOLazone (ZAROXOLYN) 2.5 MG tablet Take 1 tablet by mouth  Daily. 30 tablet 2 12/8/2021 at Unknown time   • pantoprazole (PROTONIX) 40 MG EC tablet Take 1 tablet by mouth twice a day. 180 tablet 4 12/8/2021 at Unknown time   • prazosin (MINIPRESS) 2 MG capsule Take 1 capsule by mouth every night at bedtime. 30 capsule 4 12/8/2021 at Unknown time   • sodium hypochlorite (DAKIN'S) 0.25 % topical solution Apply to wound twice a day as directed 473 mL 3 12/8/2021 at Unknown time   • aspirin 81 MG EC tablet Take 81 mg by mouth Daily.   12/8/2021   • Dulaglutide (Trulicity) 1.5 MG/0.5ML solution pen-injector Inject 1.5 mg under the skin into the appropriate area as directed 1 (One) Time Per Week. (Patient taking differently: Inject 1.5 mg under the skin into the appropriate area as directed 1 (One) Time Per Week. mondays) 6 mL 11 12/6/2021   • gabapentin (NEURONTIN) 300 MG capsule Take 2 capsules by mouth every morning and 3 capsules every night at bedtime. 150 capsule 2 Unknown at Unknown time   • Naloxegol Oxalate (Movantik) 25 MG tablet Take 1 tablet by mouth every day. 30 tablet 11 Unknown at Unknown time   • ondansetron ODT (ZOFRAN-ODT) 4 MG disintegrating tablet Place 1 tablet on tongue three times a day as needed. 15 tablet 1 More than a month at Unknown time   • rosuvastatin (CRESTOR) 40 MG tablet Take 1 tablet by mouth Every Night. 90 tablet 0 12/7/2021   • sucralfate (CARAFATE) 1 g tablet Take 1 tablet by mouth 4 (Four) Times a Day before meals 120 tablet 0 Unknown at Unknown time   • tamsulosin (FLOMAX) 0.4 MG capsule 24 hr capsule Take 1 capsule by mouth Daily.   12/7/2021   • traZODone (DESYREL) 100 MG tablet Take 1 tablet by mouth Every Night. (Patient taking differently: Take 150 mg by mouth Every Night.) 90 tablet 2 12/6/2021       Medicines:  Current Facility-Administered Medications   Medication Dose Route Frequency Provider Last Rate Last Admin   • acetaminophen (TYLENOL) tablet 650 mg  650 mg Oral Q4H PRN Garrett Alicia MD   650 mg at 12/09/21 5428    • aspirin EC tablet 81 mg  81 mg Oral Daily Garrett Alicia MD   81 mg at 12/09/21 0823   • bumetanide (BUMEX) 10 mg in sodium chloride 0.9 % 100 mL (0.1 mg/mL) infusion  1 mg/hr Intravenous Continuous Garrett Alicia MD 10 mL/hr at 12/09/21 1020 1 mg/hr at 12/09/21 1020   • busPIRone (BUSPAR) tablet 10 mg  10 mg Oral TID Garrett Alicia MD   10 mg at 12/09/21 0824   • carvedilol (COREG) tablet 6.25 mg  6.25 mg Oral BID Garrett Alicia MD   6.25 mg at 12/09/21 0823   • cyclobenzaprine (FLEXERIL) tablet 5 mg  5 mg Oral BID PRN Garrett Alicia MD       • dextrose (D50W) (25 g/50 mL) IV injection 25 g  25 g Intravenous Q15 Min PRN Garrett Alicia MD       • dextrose (GLUTOSE) oral gel 15 g  15 g Oral Q15 Min PRN Garrett Alicia MD       • donepezil (ARICEPT) tablet 5 mg  5 mg Oral Daily Garrett Alicia MD   5 mg at 12/09/21 0823   • DULoxetine (CYMBALTA) DR capsule 60 mg  60 mg Oral Daily Garrett Alicia MD   60 mg at 12/09/21 0824   • enoxaparin (LOVENOX) syringe 40 mg  40 mg Subcutaneous Q12H Garrett Alicia MD   40 mg at 12/09/21 0823   • gabapentin (NEURONTIN) capsule 600 mg  600 mg Oral TID Garrett Alicia MD   600 mg at 12/09/21 0823   • glucagon (human recombinant) (GLUCAGEN DIAGNOSTIC) injection 1 mg  1 mg Subcutaneous Q15 Min PRN Garrett Alicia MD       • influenza vac split quad (FLUZONE,FLUARIX,AFLURIA,FLULAVAL) injection 0.5 mL  0.5 mL Intramuscular During Hospitalization Garrett Alicia MD       • insulin detemir (LEVEMIR) injection 50 Units  50 Units Subcutaneous BID Garrett Alicia MD   50 Units at 12/09/21 0824   • insulin lispro (humaLOG) injection 0-9 Units  0-9 Units Subcutaneous 4x Daily With Meals & Nightly Garrett Alicia MD       • ipratropium-albuterol (DUO-NEB) nebulizer solution 3 mL  3 mL Nebulization Q6H Garrett Alicia MD       • pantoprazole  (PROTONIX) EC tablet 40 mg  40 mg Oral Q AM Garrett Alicia MD   40 mg at 12/09/21 0823   • rosuvastatin (CRESTOR) tablet 40 mg  40 mg Oral Nightly Garrett Alicia MD       • sodium chloride 0.9 % flush 10 mL  10 mL Intravenous PRN Garrett Alicia MD       • sodium chloride 0.9 % flush 10 mL  10 mL Intravenous PRN Garrett Alicia MD       • sodium chloride 0.9 % flush 10 mL  10 mL Intravenous Q12H Garrett Alicia MD   10 mL at 12/09/21 0823   • sodium chloride 0.9 % flush 10 mL  10 mL Intravenous PRN Garrett Alicia MD       • sucralfate (CARAFATE) tablet 1 g  1 g Oral 4x Daily AC & at Bedtime Garrett Alicia MD       • tamsulosin (FLOMAX) 24 hr capsule 0.4 mg  0.4 mg Oral Daily Garrett Alicia MD   0.4 mg at 12/09/21 0823   • traZODone (DESYREL) tablet 100 mg  100 mg Oral Nightly Garrett Alicia MD           Past Medical History:  Past Medical History:   Diagnosis Date   • Arthritis    • Autonomic disease    • CAD (coronary artery disease) 2/6/2017   • Coronary artery disease    • Diabetes mellitus (HCC)    • Gastroesophageal reflux disease 5/13/2019   • HTN (hypertension), benign 5/3/2017   • Hyperlipidemia    • Hypertension    • Mixed hyperlipidemia 2/7/2017   • Multiple lung nodules 1/26/2020    5mm, 9 mm RLL identified 1/2020, not present 10/2019.   • Myocardial infarction (HCC)    • Osteomyelitis (HCC) 1/22/2020   • Osteomyelitis of fifth toe of right foot (HCC) 10/7/2019   • Pancreatitis    • Persistent insomnia 1/20/2020   • Renal disorder    • Sleep apnea 2/6/2017   • Sleep apnea with use of continuous positive airway pressure (CPAP)    • Slow transit constipation 1/16/2019   • Spinal stenosis in cervical region 9/16/2021   • Vitamin D deficiency 3/2/2021       Past Surgical History:  Past Surgical History:   Procedure Laterality Date   • ABDOMINAL SURGERY     • APPENDECTOMY     • BACK SURGERY     • CARDIAC CATHETERIZATION Left  2021    Procedure: Left Heart Cath w poss intervention left anatomical snuff box acess;  Surgeon: Omkar Charles DO;  Location:  [facility]AS[facility];  Service: Cardiology;  Laterality: Left;   • CARDIAC SURGERY     • CATARACT EXTRACTION     • CERVICAL SPINE SURGERY     • COLONOSCOPY N/A 2017    Normal exam repeat in 5 years   • COLONOSCOPY N/A 2019    Mild acute inflammation   • COLONOSCOPY W/ POLYPECTOMY  2014    Hyperplastic polyp   • CORONARY ARTERY BYPASS GRAFT  10/2015   • ENDOSCOPY  2011    Gastritis with hemorrhage   • ENDOSCOPY N/A 2017    Normal exam   • ENDOSCOPY N/A 2019    Gastritis   • ENDOSCOPY N/A 2020    Non-erosive gastritis with hemorrhage   • ENDOSCOPY N/A 2/10/2021    Esophagitis   • INCISION AND DRAINAGE OF WOUND Left 2007    spider bite       Family History  Family History   Problem Relation Age of Onset   • Colon cancer Father    • Heart disease Father    • Colon cancer Sister    • Colon polyps Sister    • Alzheimer's disease Mother    • Coronary artery disease Sister    • Coronary artery disease Sister        Social History  Social History     Socioeconomic History   • Marital status:    Tobacco Use   • Smoking status: Former Smoker     Quit date:      Years since quittin.9   • Smokeless tobacco: Never Used   • Tobacco comment: smoked in highschool   Substance and Sexual Activity   • Alcohol use: No   • Drug use: No   • Sexual activity: Defer         Review of Systems:  History obtained from chart review and the patient  General ROS: No fever or chills  Respiratory ROS: positive for - shortness of breath  Cardiovascular ROS: positive for - dyspnea on exertion and edema  No chest pain or palpitations  Gastrointestinal ROS: No abdominal pain or melena  Genito-Urinary ROS: No dysuria or hematuria  Psych ROS: No anxiety and depression  14 point ROS reviewed with the patient and negative except as noted above and in the  "HPI unless unable to obtain.      Objective:  Patient Vitals for the past 24 hrs:   BP Temp Temp src Pulse Resp SpO2 Height Weight   12/09/21 0700 158/82 97.6 °F (36.4 °C) Oral 65 16 99 % -- --   12/09/21 0305 153/79 98.6 °F (37 °C) Oral 72 16 96 % 182.9 cm (72\") --   12/09/21 0016 176/82 -- -- 82 -- 94 % -- --   12/09/21 0001 150/81 -- -- 80 -- 97 % -- --   12/08/21 2348 155/68 -- -- 80 -- -- -- --   12/08/21 2133 -- -- -- 80 -- 92 % -- --   12/08/21 2118 -- -- -- 82 -- 94 % -- --   12/08/21 2104 -- -- -- 81 -- 94 % -- --   12/08/21 2053 -- -- -- 94 -- 94 % -- --   12/08/21 2036 -- -- -- 80 -- 93 % -- --   12/08/21 2030 -- -- -- 81 -- 95 % -- --   12/08/21 2025 -- -- -- 78 -- 95 % -- --   12/08/21 2009 159/53 98 °F (36.7 °C) Oral 84 22 96 % 182.9 cm (72\") (!) 153 kg (337 lb 15.4 oz)       Intake/Output Summary (Last 24 hours) at 12/9/2021 1024  Last data filed at 12/9/2021 0900  Gross per 24 hour   Intake --   Output 4250 ml   Net -4250 ml     General: awake/alert   Chest:  diminished bases  CVS: regular rate and rhythm  Abdominal: distended, soft, nontender  Extremities: 4+ edema to mid thighs  Skin: warm and dry without rash  Neuro: no focal motor deficits    Labs:  Results from last 7 days   Lab Units 12/09/21  0519 12/08/21 2041   WBC 10*3/mm3 7.32 6.99   HEMOGLOBIN g/dL 9.1* 9.1*   HEMATOCRIT % 30.0* 29.7*   PLATELETS 10*3/mm3 207 206         Results from last 7 days   Lab Units 12/09/21  0519 12/08/21 2041   SODIUM mmol/L 138 137   POTASSIUM mmol/L 3.8 4.1   CHLORIDE mmol/L 99 98   CO2 mmol/L 29.0 27.0   BUN mg/dL 37* 36*   CREATININE mg/dL 2.01* 2.09*   CALCIUM mg/dL 8.6 8.6   BILIRUBIN mg/dL  --  0.4   ALK PHOS U/L  --  109   ALT (SGPT) U/L  --  11   AST (SGOT) U/L  --  15   GLUCOSE mg/dL 127* 276*       Radiology:   Imaging Results (Last 72 Hours)     Procedure Component Value Units Date/Time    XR Chest 1 View [[id number]] Collected: 12/08/21 2056     Updated: 12/08/21 2101    Narrative:      " EXAMINATION: XR CHEST 1 VW- 12/8/2021 8:56 PM CST     HISTORY: SOB.     REPORT: Frontal view the chest was obtained.     COMPARISON: Chest x-ray 11/7/2021.     The lungs are grossly hypoaerated, there are central basilar vascular  crowding, with borderline cardiomegaly. Scattered calcified granulomas  are present. There is no lung consolidation. Previous CABG is evident.  No pneumothorax or pleural effusion is identified. There is previous  ACDF.       Impression:      Gross hypoaeration of the lungs with central and basilar  vascular crowding, no overt evidence of CHF or pneumonia.  This report was finalized on 12/08/2021 20:58 by Dr. Percy Cesar MD.          Culture:  No results found for: BLOODCX, URINECX, WOUNDCX, MRSACX, RESPCX, STOOLCX      Assessment   1.  Acute kidney injury  2.  Baseline chronic kidney disease stage 3b  3.  Anasarca/volume overload  4.  Type 2 diabetes  5.  Hypertension  6.  CAD with prior CABG  7.  Obstructive sleep apnea  8.  Obesity   9.  Wound at site of previous 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    Plan:  1.  Continue Bumex drip, good response so far  2.  Continue fluid restriction  3.  Monitor labs  4.  Further assessment and plan pending Dr Arteaga's evaluation of patient      Thank you for the consult, we appreciate the opportunity to provide care to your patients.  Feel free to contact me if I can be of any further assistance.      Stewart Alvarez, APRN  12/9/2021  10:24 CST

## 2021-12-09 NOTE — CASE MANAGEMENT/SOCIAL WORK
Discharge Planning Assessment  [facility]     Patient Name: Erick Luong  MRN: [id number]  Today's Date: 12/9/2021    Admit Date: 12/8/2021     Discharge Needs Assessment     Row Name 12/09/21 0859       Living Environment    Lives With spouse; child(julia), dependent    Name(s) of Who Lives With Patient Wife-  Joan    Current Living Arrangements home/apartment/condo    Primary Care Provided by self    Provides Primary Care For child(julia)    Family Caregiver if Needed spouse    Quality of Family Relationships involved; helpful; supportive    Able to Return to Prior Arrangements yes       Resource/Environmental Concerns    Resource/Environmental Concerns none       Transition Planning    Patient/Family Anticipates Transition to home with family    Patient/Family Anticipated Services at Transition home health care    Transportation Anticipated family or friend will provide       Discharge Needs Assessment    Readmission Within the Last 30 Days no previous admission in last 30 days    Equipment Currently Used at Home walker, standard    Concerns to be Addressed no discharge needs identified    Anticipated Changes Related to Illness none    Equipment Needed After Discharge none    Outpatient/Agency/Support Group Needs homecare agency  Pt is current with [facility].  SW spoke to [facility] to inform of admission.  They will need resumption orders upon dc.    Current Discharge Risk chronically ill    Discharge Coordination/Progress Pt resides with spouse and children.  Pt has PCP, RX coverage and can afford medications.  Pt plans to return home and resume [facility].  SW will follow.               Discharge Plan    No documentation.               Continued Care and Services - Admitted Since 12/8/2021    Coordination has not been started for this encounter.          Demographic Summary    No documentation.                Functional Status    No documentation.                Psychosocial    No documentation.                Abuse/Neglect     No documentation.                Legal    No documentation.                Substance Abuse    No documentation.                Patient Forms    No documentation.                   APOLINAR GeorgeW

## 2021-12-09 NOTE — PLAN OF CARE
Goal Outcome Evaluation:              Outcome Summary: Pt transferred from room 430 to room 402. Generalized edema, bumex drip infusing, has had large urine output, fluid restriction continues, on RA, pt states he has cpap at home but does not wear because he is unable to tolerate it. c/o of chronic h/a, prn tylenol and flexeril given, HR S 64-81, up in chair several hours, wound care to see pt regarding wound care orders for Left foot 5th digit amputee

## 2021-12-09 NOTE — ED PROVIDER NOTES
Subjective   Patient presents with a complaint of swelling diffusely in his legs and his abdominal wall. He says is causing him to be very short of breath. He has seen his doctor and tried to get some medication to relieve it but it is not helping. He is already on some Bumex and had some metolazone added but has not helped in any way. He says he has put on about 40 pounds in the last couple weeks. He called his wife and asked her to get him some help and when she went to check on him she says her pulse ox measured 80 to 84% at home when she tried to get in here. By the time he got here he was better but it was very low at home. He has had this problem for and had to be hospitalized with IV Bumex.      History provided by:  Patient   used: No    Shortness of Breath  Severity:  Moderate  Onset quality:  Gradual  Duration:  2 weeks  Timing:  Constant  Progression:  Worsening  Chronicity:  Recurrent  Context: not activity, not animal exposure, not emotional upset, not fumes, not known allergens, not occupational exposure, not pollens, not smoke exposure, not strong odors, not URI and not weather changes    Relieved by:  Nothing  Worsened by:  Nothing  Ineffective treatments:  Diuretics  Associated symptoms: no abdominal pain, no chest pain, no claudication, no cough, no diaphoresis, no ear pain, no fever, no headaches, no hemoptysis, no neck pain, no PND, no rash, no sore throat, no sputum production, no syncope, no swollen glands, no vomiting and no wheezing    Risk factors: no recent alcohol use, no family hx of DVT, no hx of cancer, no hx of PE/DVT, no obesity, no oral contraceptive use, no prolonged immobilization, no recent surgery and no tobacco use        Review of Systems   Constitutional: Negative.  Negative for diaphoresis and fever.   HENT: Negative.  Negative for ear pain and sore throat.    Respiratory: Positive for shortness of breath. Negative for cough, hemoptysis, sputum production  "and wheezing.    Cardiovascular: Negative.  Negative for chest pain, claudication, syncope and PND.   Gastrointestinal: Negative.  Negative for abdominal pain and vomiting.   Genitourinary: Negative.    Musculoskeletal: Negative.  Negative for neck pain.   Skin: Negative.  Negative for rash.   Neurological: Negative.  Negative for headaches.   Psychiatric/Behavioral: Negative.    All other systems reviewed and are negative.      Past Medical History:   Diagnosis Date   • Arthritis    • Autonomic disease    • CAD (coronary artery disease) 2/6/2017   • Coronary artery disease    • Diabetes mellitus (HCC)    • Gastroesophageal reflux disease 5/13/2019   • HTN (hypertension), benign 5/3/2017   • Hyperlipidemia    • Hypertension    • Mixed hyperlipidemia 2/7/2017   • Multiple lung nodules 1/26/2020    5mm, 9 mm RLL identified 1/2020, not present 10/2019.   • Myocardial infarction (HCC)    • Osteomyelitis ([facility]) 1/22/2020   • Osteomyelitis of fifth toe of right foot (HCC) 10/7/2019   • Pancreatitis    • Persistent insomnia 1/20/2020   • Renal disorder    • Sleep apnea 2/6/2017   • Sleep apnea with use of continuous positive airway pressure (CPAP)    • Slow transit constipation 1/16/2019       Allergies   Allergen Reactions   • Cefepime Hives and Anaphylaxis   • Bactrim [Sulfamethoxazole-Trimethoprim] Other (See Comments)     \"RENAL FAILURE\"   • Clindamycin Itching   • Vancomycin Itching   • Metronidazole Rash       Past Surgical History:   Procedure Laterality Date   • ABDOMINAL SURGERY     • APPENDECTOMY     • BACK SURGERY     • CARDIAC CATHETERIZATION Left 2/8/2021    Procedure: Left Heart Cath w poss intervention left anatomical snuff box acess;  Surgeon: Omkar Charles DO;  Location:  [facility];  Service: Cardiology;  Laterality: Left;   • CARDIAC SURGERY     • CATARACT EXTRACTION     • CERVICAL SPINE SURGERY     • COLONOSCOPY N/A 1/31/2017    Normal exam repeat in 5 years   • COLONOSCOPY " N/A 2019    Mild acute inflammation   • COLONOSCOPY W/ POLYPECTOMY  2014    Hyperplastic polyp   • CORONARY ARTERY BYPASS GRAFT  10/2015   • ENDOSCOPY  2011    Gastritis with hemorrhage   • ENDOSCOPY N/A 2017    Normal exam   • ENDOSCOPY N/A 2019    Gastritis   • ENDOSCOPY N/A 2020    Non-erosive gastritis with hemorrhage   • ENDOSCOPY N/A 2/10/2021    Esophagitis   • INCISION AND DRAINAGE OF WOUND Left 2007    spider bite       Family History   Problem Relation Age of Onset   • Colon cancer Father    • Heart disease Father    • Colon cancer Sister    • Colon polyps Sister    • Alzheimer's disease Mother    • Coronary artery disease Sister    • Coronary artery disease Sister        Social History     Socioeconomic History   • Marital status:    Tobacco Use   • Smoking status: Former Smoker     Quit date:      Years since quittin.9   • Smokeless tobacco: Never Used   • Tobacco comment: smoked in highschool   Substance and Sexual Activity   • Alcohol use: No   • Drug use: No   • Sexual activity: Defer           Objective   Physical Exam  Vitals and nursing note reviewed.   Constitutional:       Appearance: He is well-developed.   HENT:      Head: Normocephalic and atraumatic.   Cardiovascular:      Rate and Rhythm: Normal rate and regular rhythm.   Pulmonary:      Effort: Pulmonary effort is normal.      Breath sounds: Normal breath sounds.   Abdominal:      General: Bowel sounds are normal.      Palpations: Abdomen is soft.      Comments: Patient abdomen does seem to be somewhat protuberant. He has signs of edema and abdominal wall.   Musculoskeletal:         General: Normal range of motion.      Cervical back: Normal range of motion and neck supple.      Right lower leg: Edema present.      Left lower leg: Edema present.      Comments: Patient does have 3+ edema both lower extremities all the way up to the thighs area.   Skin:     General: Skin is warm and dry.    Neurological:      General: No focal deficit present.      Mental Status: He is alert and oriented to person, place, and time.   Psychiatric:         Mood and Affect: Mood normal.         Behavior: Behavior normal.         Procedures           ED Course  ED Course as of 12/09/21 0005   Thu Dec 09, 2021   0004 Patient's renal function is not quite as good as it has been in the past although he will require more aggressive intervention to try to relieve some of his fluid. He does not have overt failure at the present time but is obviously having a lot of fluid retention. Will admit for further care. [TR]      ED Course User Index  [TR] Karel Arriaza Jr., MD                                                 MDM  Number of Diagnoses or Management Options  Anasarca: new and requires workup     Amount and/or Complexity of Data Reviewed  Clinical lab tests: ordered and reviewed  Tests in the radiology section of CPT®: ordered and reviewed  Tests in the medicine section of CPT®: reviewed and ordered  Decide to obtain previous medical records or to obtain history from someone other than the patient: yes  Discuss the patient with other providers: yes    Risk of Complications, Morbidity, and/or Mortality  Presenting problems: moderate  Diagnostic procedures: moderate  Management options: moderate    Patient Progress  Patient progress: stable      Final diagnoses:   Anasarca       ED Disposition  ED Disposition     ED Disposition Condition Comment    Decision to Admit  Level of Care: Med/Surg [1]   Diagnosis: Anasarca [[id number]]   Admitting Physician: CATHERINE HINOJOSA [[id number]]   Attending Physician: CATHERINE HINOJOSA [[id number]]   Isolate for COVID?: No [0]   Certification: I Certify That Inpatient Hospital Services Are Medically Necessary For Greater Than 2 Midnights            No follow-up provider specified.       Medication List      No changes were made to your prescriptions during this visit.          Arsalan  Karel ODONNELL Jr., MD  12/09/21 0005

## 2021-12-09 NOTE — PAYOR COMM NOTE
"12/9/12 [facility] [phone]  [phone]    PATIENT ER ADMISSION TO INPATIENT ON 12/8/21. FAXING CLINICAL FOR INPATIENT REVIEW.        Erick Luong (66 y.o. Male)             Date of Birth Social Security Number Address Home Phone MRN    1955  [address]  [place] [address] [phone] [id number]    Jewish Marital Status             Yarsanism        Admission Date Admission Type Admitting Provider Attending Provider Department, Room/Bed    12/8/21 Emergency Payam Keyes DO Robinson, Maurice S, DO [facility] 4B, 430/1    Discharge Date Discharge Disposition Discharge Destination                         Attending Provider: Payam Keyes DO    Allergies: Cefepime, Bactrim [Sulfamethoxazole-trimethoprim], Clindamycin, Vancomycin, Metronidazole    Isolation: None   Infection: MRSA (05/19/19)   Code Status: CPR   Advance Care Planning Activity    Ht: 182.9 cm (72\")   Wt: 153 kg (337 lb 15.4 oz)    Admission Cmt: None   Principal Problem: Anasarca [R60.1]                 Active Insurance as of 12/8/2021     Primary Coverage     Payor Plan Insurance Group Employer/Plan Group    [employer] [facility] EMPLOYEE [id number]     Payor Plan Address Payor Plan Phone Number Payor Plan Fax Number Effective Dates    [address] [phone]  1/1/2018 - None Entered    [facility] [id number]       Subscriber Name Subscriber Birth Date Member ID       ZAINAB LUONG MARTINA 11/27/1969 [id number]           Secondary Coverage     Payor Plan Insurance Group Employer/Plan Group    MEDICARE MEDICARE A & B      Payor Plan Address Payor Plan Phone Number Payor Plan Fax Number Effective Dates    [address] [phone]  7/1/2013 - None Entered    [facility] [id number]       Subscriber Name Subscriber Birth Date Member ID       ERICK LUONG 1955 [id number]                 Emergency Contacts      (Rel.) Home Phone Work Phone Mobile " Phone    CheriseJoan (Spouse) [phone]                                [facility] Encounter Date/Time: 2021   Hospital Account: [id number]    MRN: [id number]   Patient:  Erick Luong   Contact Serial #: [id number]   SSN:          ENCOUNTER             Patient Class: Inpatient   Unit: [address] Service: Medicine     Bed: 430/1   Admitting Provider: Payam Keyes DO   Referring Physician: Garrett Alicia   Attending Provider: Payam Keyes DO   Adm Diagnosis: Anasarca [R60.1]               PATIENT          Name: Erick Luong : 1955 (66 yrs)   Address: [address] [place] [address] Sex: Male   City: [place] [address]   County: [facility]   Marital Status:  Ethnicity: NOT                                                                              Race: WHITE   Primary Care Provider: Del Shetty MD Patients Phone: Home Phone: [phone]     Mobile Phone: [phone]   EMERGENCY CONTACT   Contact Name Legal Guardian? Relationship to Patient Home Phone Work Phone   1. Joan Luong  2. *No Contact Specified* No    Spouse    [phone]         GUARANTOR            Guarantor: Erick Luong     : 1955   Address: [address] [place], [place] [address] Sex: Male     [place] [address]     Relation to Patient: Self       Home Phone: [phone]   Guarantor ID: [id number]       Work Phone: [phone]   GUARANTOR EMPLOYER   Employer:           Status: DISABLED   COVERAGE          PRIMARY INSURANCE   Payor: Supportie Plan: [employer] EMPL[employer]EE   Group Number: [id number] Insurance Type: INDEMNITY   Subscriber Name: JOAN LUONG Subscriber : 1969   Subscriber ID: [id number] Coverage Address: [place] [address]   Pat. Rel. to Subscriber: Spouse Coverage Phone: [phone]   SECONDARY INSURANCE   Payor: MEDICARE Plan: MEDICARE A & B   Group Number:   Insurance Type:  INDEMNITY   Subscriber Name: SUZETTE TAMAYO Subscriber : 1955   Subscriber ID: [id number] Coverage Address: [place] [address]   Pat. Rel. to Subscriber: SELF Coverage Phone: [phone]      Contact Serial # ([id number])  `       2021    Chart ID ([id number])               Department Encounter #   2021  8:12 PM  [facility] 4B [id number]     Karel Arriaza Jr., MD   Physician   Emergency Medicine   ED Provider Notes       Signed   Date of Service:  21   Creation Time:  21              Signed        Expand AllCollapse All          Show:Clear all  [x]Manual[x]Template[]Copied    Added by:  [x]Karel Arriaza Jr., MD      []Alexa for details       Subjective      Patient presents with a complaint of swelling diffusely in his legs and his abdominal wall. He says is causing him to be very short of breath. He has seen his doctor and tried to get some medication to relieve it but it is not helping. He is already on some Bumex and had some metolazone added but has not helped in any way. He says he has put on about 40 pounds in the last couple weeks. He called his wife and asked her to get him some help and when she went to check on him she says her pulse ox measured 80 to 84% at home when she tried to get in here. By the time he got here he was better but it was very low at home. He has had this problem for and had to be hospitalized with IV Bumex.        History provided by:  Patient   used: No    Shortness of Breath  Severity:  Moderate  Onset quality:  Gradual  Duration:  2 weeks  Timing:  Constant  Progression:  Worsening  Chronicity:  Recurrent  Context: not activity, not animal exposure, not emotional upset, not fumes, not known allergens, not occupational exposure, not pollens, not smoke exposure, not strong odors, not URI and not weather changes    Relieved by:  Nothing  Worsened by:  Nothing  Ineffective treatments:   Diuretics  Associated symptoms: no abdominal pain, no chest pain, no claudication, no cough, no diaphoresis, no ear pain, no fever, no headaches, no hemoptysis, no neck pain, no PND, no rash, no sore throat, no sputum production, no syncope, no swollen glands, no vomiting and no wheezing    Risk factors: no recent alcohol use, no family hx of DVT, no hx of cancer, no hx of PE/DVT, no obesity, no oral contraceptive use, no prolonged immobilization, no recent surgery and no tobacco use                   Review of Systems   Constitutional: Negative.  Negative for diaphoresis and fever.   HENT: Negative.  Negative for ear pain and sore throat.    Respiratory: Positive for shortness of breath. Negative for cough, hemoptysis, sputum production and wheezing.    Cardiovascular: Negative.  Negative for chest pain, claudication, syncope and PND.   Gastrointestinal: Negative.  Negative for abdominal pain and vomiting.   Genitourinary: Negative.    Musculoskeletal: Negative.  Negative for neck pain.   Skin: Negative.  Negative for rash.   Neurological: Negative.  Negative for headaches.   Psychiatric/Behavioral: Negative.    All other systems reviewed and are negative.        Medical History        Past Medical History:   Diagnosis Date   • Arthritis     • Autonomic disease     • CAD (coronary artery disease) 2/6/2017   • Coronary artery disease     • Diabetes mellitus (HCC)     • Gastroesophageal reflux disease 5/13/2019   • HTN (hypertension), benign 5/3/2017   • Hyperlipidemia     • Hypertension     • Mixed hyperlipidemia 2/7/2017   • Multiple lung nodules 1/26/2020     5mm, 9 mm RLL identified 1/2020, not present 10/2019.   • Myocardial infarction (HCC)     • Osteomyelitis (HCC) 1/22/2020   • Osteomyelitis of fifth toe of right foot (HCC) 10/7/2019   • Pancreatitis     • Persistent insomnia 1/20/2020   • Renal disorder     • Sleep apnea 2/6/2017   • Sleep apnea with use of continuous positive airway pressure (CPAP)     •  "Slow transit constipation 1/16/2019                  Allergies   Allergen Reactions   • Cefepime Hives and Anaphylaxis   • Bactrim [Sulfamethoxazole-Trimethoprim] Other (See Comments)       \"RENAL FAILURE\"   • Clindamycin Itching   • Vancomycin Itching   • Metronidazole Rash         Surgical History         Past Surgical History:   Procedure Laterality Date   • ABDOMINAL SURGERY       • APPENDECTOMY       • BACK SURGERY       • CARDIAC CATHETERIZATION Left 2/8/2021     Procedure: Left Heart Cath w poss intervention left anatomical snuff box acess;  Surgeon: Omkar Charles DO;  Location:  [facility] CATH INVASIVE LOCATION;  Service: Cardiology;  Laterality: Left;   • CARDIAC SURGERY       • CATARACT EXTRACTION       • CERVICAL SPINE SURGERY       • COLONOSCOPY N/A 1/31/2017     Normal exam repeat in 5 years   • COLONOSCOPY N/A 2/11/2019     Mild acute inflammation   • COLONOSCOPY W/ POLYPECTOMY   03/04/2014     Hyperplastic polyp   • CORONARY ARTERY BYPASS GRAFT   10/2015   • ENDOSCOPY   04/13/2011     Gastritis with hemorrhage   • ENDOSCOPY N/A 5/5/2017     Normal exam   • ENDOSCOPY N/A 2/11/2019     Gastritis   • ENDOSCOPY N/A 9/1/2020     Non-erosive gastritis with hemorrhage   • ENDOSCOPY N/A 2/10/2021     Esophagitis   • INCISION AND DRAINAGE OF WOUND Left 09/2007     spider bite                  Family History   Problem Relation Age of Onset   • Colon cancer Father     • Heart disease            Physical Exam  Vitals and nursing note reviewed.   Constitutional:       Appearance: He is well-developed.   HENT:      Head: Normocephalic and atraumatic.   Cardiovascular:      Rate and Rhythm: Normal rate and regular rhythm.   Pulmonary:      Effort: Pulmonary effort is normal.      Breath sounds: Normal breath sounds.   Abdominal:      General: Bowel sounds are normal.      Palpations: Abdomen is soft.      Comments: Patient abdomen does seem to be somewhat protuberant. He has signs of edema and abdominal " wall.   Musculoskeletal:         General: Normal range of motion.      Cervical back: Normal range of motion and neck supple.      Right lower leg: Edema present.      Left lower leg: Edema present.      Comments: Patient does have 3+ edema both lower extremities all the way up to the thighs area.   Skin:     General: Skin is warm and dry.   Neurological:      General: No focal deficit present.      Mental Status: He is alert and oriented to person, place, and time.   Psychiatric:         Mood and Affect: Mood normal.         Behavior: Behavior normal.           ED Course as of 12/09/21 0005   Thu Dec 09, 2021   0004 Patient's renal function is not quite as good as it has been in the past although he will require more aggressive intervention to try to relieve some of his fluid. He does not have overt failure at the present time but is obviously having a lot of fluid retention. Will admit for further care. [TR]       ED Course User Index  [TR] Karel Arriaza Jr., MD                                           MDM  Number of Diagnoses or Management Options  Anasarca: new and requires workup     Amount and/or Complexity of Data Reviewed  Clinical lab tests: ordered and reviewed  Tests in the radiology section of CPT®: ordered and reviewed  Tests in the medicine section of CPT®: reviewed and ordered  Decide to obtain previous medical records or to obtain history from someone other than the patient: yes  Discuss the patient with other providers: yes     Risk of Complications, Morbidity, and/or Mortality  Presenting problems: moderate  Diagnostic procedures: moderate  Management options: moderate   Patient Progress  Patient progress: stable        Final diagnoses:   Anasarca         ED Disposition         ED Disposition      ED Disposition Condition Comment     Decision to Admit   Level of Care: Med/Surg [1]   Diagnosis: Anasarca [422502]   Admitting Physician: CATHERINE HINOJOSA [[id number]]   Attending Physician:  GARRETT HINOJOSA [[id number]]   Isolate for COVID?: No [0]   Certification: I Certify That Inpatient Hospital Services Are Medically Necessary For Greater Than 2 Midnights                No follow-up provider specified.         Medication List       No changes were made to your prescriptions during this visit.            Karel Arriaza Jr., MD  12/09/21 0005                  ED to Hosp-Admission (Current) on 12/8/2021                        Encounter Information    Encounter Information    Department Encounter #   12/8/2021  8:12 PM [address] [id number]   Garrett Hinojosa MD   Physician   Medicine   H&P       Signed   Date of Service:  12/08/21 2233   Creation Time:  12/08/21 2233              Signed        Expand AllCollapse All            Show:Clear all  [x]Manual[x]Template[x]Copied    Added by:  [x]Garrett Hinojosa MD      [][facility] for details           [facility] Medicine Services  HISTORY AND PHYSICAL     Date of Admission: 12/8/2021  Primary Care Physician: Del Shetty MD     Subjective      Chief Complaint: Leg swelling     History of Present Illness  66 year old male with PMH of CAD, DM ID, HTN, CKD, BRITTNI, that presents to the ER with complaints of leg edema and fluid retention. He has tried Bumex or and Metolazone recently added but continues to have worsening edema and weight wain. Tonight he felt short of breath with activity and pulse ox at home was read as low 80%, he did not present hypoxemic to to the ER or distressed.      Review of Systems   Otherwise complete ROS reviewed and negative except as mentioned in the HPI.     Past Medical History:   Medical History        Past Medical History:   Diagnosis Date   • Arthritis     • Autonomic disease     • CAD (coronary artery disease) 2/6/2017   • Coronary artery disease     • Diabetes mellitus (HCC)     • Gastroesophageal reflux disease 5/13/2019   • HTN (hypertension), benign 5/3/2017   •  "Hyperlipidemia     • Hypertension     • Mixed hyperlipidemia 2/7/2017   • Multiple lung nodules 1/26/2020     5mm, 9 mm RLL identified 1/2020, not present 10/2019.   • Myocardial infarction (HCC)     • Osteomyelitis (HCC) 1/22/2020   • Osteomyelitis of fifth toe of right foot (HCC) 10/7/2019   • Pancreatitis     • Persistent insomnia 1/20/2020   • Renal disorder     • Sleep apnea 2/6/2017   • Sleep apnea with use of continuous positive airway pressure (CPAP)     • Slow transit constipation 1/16/2019         Past Surgical History:  Surgical History         Past Surgical History:   Procedure Laterality Date   • ABDOMINAL SURGERY       • APPENDECTOMY       • BACK SURGERY       • CARDIAC CATHETERIZATION Left 2/8/2021     Procedure: Left Heart Cath w poss intervention left anatomical snuff box acess;  Surgeon: Omkar Charles DO;  Location:  [facility] CATH INVASIVE LOCATION;  Service: Cardiology;  Laterality: Left;   • CARDIAC SURGERY       • CATARACT EXTRACTION       • CERVICAL SPINE SURGERY       • COLONOSCOPY N/A 1/31/2017     Normal exam repeat in 5 years   • COLONOSCOPY N/A 2/11/2019     Mild acute inflammation   • COLONOSCOPY W/ POLYPECTOMY   03/04/2014     Hyperplastic polyp   • CORONARY ARTERY BYPASS GRAFT   10/2015   • ENDOSCOPY   04/13/2011     Gastritis with hemorrhage   • ENDOSCOPY N/A 5/5/2017     Normal exam   • ENDOSCOPY N/A 2/11/2019     Gastritis   • ENDOSCOPY N/A 9/1/2020     Non-erosive gastritis with hemorrhage   • ENDOSCOPY N/A 2/10/2021     Esophagitis   • INCISION AND DRAINAGE OF WOUND Left 09/2007     spider bite         Social History:  reports that he quit smoking about 30 years ago. He has never used smokeless tobacco. He reports that he does not drink alcohol and does not use drugs.                       Vital Signs: /53 (BP Location: Right arm, Patient Position: Sitting)   Pulse 80   Temp 98 °F (36.7 °C) (Oral)   Resp 22   Ht 182.9 cm (72\")   Wt (!) 153 kg (337 lb 15.4 oz)  "  SpO2 92%   BMI 45.84 kg/m²   Physical Exam  Constitutional:       Appearance: He is well-developed. He is not ill-appearing or diaphoretic.   HENT:      Head: Normocephalic and atraumatic.      Right Ear: External ear normal.      Left Ear: External ear normal.      Nose: Nose normal.      Mouth/Throat:      Mouth: Mucous membranes are dry.   Eyes:      General:         Right eye: No discharge.         Left eye: No discharge.      Extraocular Movements: Extraocular movements intact.      Conjunctiva/sclera: Conjunctivae normal.      Pupils: Pupils are equal, round, and reactive to light.   Neck:      Vascular: No JVD.   Cardiovascular:      Rate and Rhythm: Normal rate and regular rhythm.      Heart sounds: Normal heart sounds. No murmur heard.       Pulmonary:      Effort: Pulmonary effort is normal. No respiratory distress.      Breath sounds: Normal breath sounds. No wheezing or rales.   Chest:      Chest wall: No tenderness.   Abdominal:      General: Bowel sounds are normal. There is no distension.      Palpations: Abdomen is soft.      Tenderness: There is no abdominal tenderness. There is no guarding or rebound.   Musculoskeletal:         General: No tenderness or deformity. Normal range of motion.      Cervical back: Normal range of motion and neck supple. No rigidity.      Right lower leg: Edema present.      Left lower leg: Edema present.   Skin:     General: Skin is warm and dry.      Findings: No rash.   Neurological:      General: No focal deficit present.      Mental Status: He is alert and oriented to person, place, and time. Mental status is at baseline.      Cranial Nerves: No cranial nerve deficit.      Sensory: No sensory deficit.      Motor: No abnormal muscle tone.      Deep Tendon Reflexes: Reflexes normal.   Psychiatric:         Mood and Affect: Mood normal.         Behavior: Behavior normal.      Edema in lowed abdominal wall to feet, 3+ pitting, tense.   Left 5th toe amputation, see  media                     Tube hold for add-on         Comment: Auto resulted        Urinalysis With Microscopic If Indicated (No Culture) - Urine, Clean Catch [[id number]]  (Abnormal) Collected: 12/08/21 2112     Specimen: Urine, Clean Catch Updated: 12/08/21 2139       Color, UA Yellow       Appearance, UA Clear       pH, UA 6.5       Specific Gravity, UA 1.019       Glucose, UA >=1000 mg/dL (3+)       Ketones, UA Negative       Bilirubin, UA Negative       Blood, UA Negative       Protein, UA 30 mg/dL (1+)       Leuk Esterase, UA Negative       Nitrite, UA Negative       Urobilinogen, UA 1.0 E.U./dL     Urinalysis, Microscopic Only - Urine, Clean Catch [[id number]] Collected: 12/08/21 2112     Specimen: Urine, Clean Catch Updated: 12/08/21 2139       RBC, UA None Seen /HPF         WBC, UA None Seen /HPF         Bacteria, UA None Seen /HPF         Squamous Epithelial Cells, UA 0-2 /HPF         Methodology Manual Light Microscopy     Comprehensive Metabolic Panel [[id number]]  (Abnormal) Collected: 12/08/21 2041     Specimen: Blood Updated: 12/08/21 2118       Glucose 276 mg/dL         BUN 36 mg/dL         Creatinine 2.09 mg/dL         Sodium 137 mmol/L         Potassium 4.1 mmol/L         Chloride 98 mmol/L         CO2 27.0 mmol/L         Calcium 8.6 mg/dL         Total Protein 6.7 g/dL         Albumin 3.70 g/dL         ALT (SGPT) 11 U/L         AST (SGOT) 15 U/L         Alkaline Phosphatase 109 U/L         Total Bilirubin 0.4 mg/dL         eGFR Non African Amer 32 mL/min/1.73         Globulin 3.0 gm/dL         A/G Ratio 1.2 g/dL         BUN/Creatinine Ratio 17.2       Anion Gap 12.0 mmol/L       Narrative:       GFR Normal >60  Chronic Kidney Disease <60  Kidney Failure <15        Troponin [[id number]]  (Normal) Collected: 12/08/21 2041     Specimen: Blood Updated: 12/08/21 2114       Troponin T <0.010 ng/mL       Narrative:       Troponin T Reference Range:  <= 0.03 ng/mL-   Negative for AMI  >0.03 ng/mL-      Abnormal for myocardial necrosis.  Clinicians would have to utilize clinical acumen, EKG, Troponin and serial changes to determine if it is an Acute Myocardial Infarction or myocardial injury due to an underlying chronic condition.         Results may be falsely decreased if patient taking Biotin.        BNP [[id number]]  (Abnormal) Collected: 12/08/21 2041     Specimen: Blood Updated: 12/08/21 2114       proBNP 1,308.0 pg/mL       Narrative:       Among patients with dyspnea, NT-proBNP is highly sensitive for the detection of acute congestive heart failure. In addition NT-proBNP of <300 pg/ml effectively rules out acute congestive heart failure with 99% negative predictive value.     Results may be falsely decreased if patient taking Biotin.        Magnesium [[id number]]  (Normal) Collected: 12/08/21 2041     Specimen: Blood Updated: 12/08/21 2112       Magnesium 2.2 mg/dL       CBC & Differential [[id number]]  (Abnormal) Collected: 12/08/21 2041     Specimen: Blood Updated: 12/08/21 2056     Narrative:       The following orders were created for panel order CBC & Differential.  Procedure                               Abnormality         Status                     ---------                               -----------         ------                     CBC Auto Differential[[id number]]        Abnormal            Final result                  Please view results for these tests on the individual orders.     CBC Auto Differential [[id number]]  (Abnormal) Collected: 12/08/21 2041     Specimen: Blood Updated: 12/08/21 2056       WBC 6.99 10*3/mm3         RBC 3.32 10*6/mm3         Hemoglobin 9.1 g/dL         Hematocrit 29.7 %         MCV 89.5 fL         MCH 27.4 pg         MCHC 30.6 g/dL         RDW 13.8 %         RDW-SD 45.0 fl         MPV 10.6 fL         Platelets 206 10*3/mm3         Neutrophil % 65.9 %         Lymphocyte % 17.9 %         Monocyte % 10.3 %         Eosinophil % 4.9 %         Basophil % 0.4 %         Immature  Grans % 0.6 %         Neutrophils, Absolute 4.61 10*3/mm3         Lymphocytes, Absolute 1.25 10*3/mm3         Monocytes, Absolute 0.72 10*3/mm3         Eosinophils, Absolute 0.34 10*3/mm3         Basophils, Absolute 0.03 10*3/mm3         Immature Grans, Absolute 0.04 10*3/mm3         nRBC 0.0 /100 WBC       [place] [[id number]] Collected: 12/08/21 2041     Specimen: Blood Updated: 12/08/21 2053                   Imaging Results (Last 24 Hours)      Procedure Component Value Units Date/Time     XR Chest 1 View [[id number]] Collected: 12/08/21 2056       Updated: 12/08/21 2101     Narrative:       EXAMINATION: XR CHEST 1 VW- 12/8/2021 8:56 PM CST     HISTORY: SOB.     REPORT: Frontal view the chest was obtained.     COMPARISON: Chest x-ray 11/7/2021.     The lungs are grossly hypoaerated, there are central basilar vascular  crowding, with borderline cardiomegaly. Scattered calcified granulomas  are present. There is no lung consolidation. Previous CABG is evident.  No pneumothorax or pleural effusion is identified. There is previous  ACDF.        Impression:       Gross hypoaeration of the lungs with central and basilar  vascular crowding, no overt evidence of CHF or pneumonia.  This report was finalized on 12/08/2021 20:58 by Dr. Percy Cesar MD.          I have personally reviewed and interpreted the radiology studies and ECG obtained at time of admission.      Assessment / Plan      Assessment:        Active Hospital Problems     Diagnosis     • **Anasarca     • Hx of CABG     • Sleep apnea with use of continuous positive airway pressure (CPAP)     • Class 3 severe obesity due to excess calories with body mass index (BMI) of 45.0 to 49.9 in adult ([facility])     • Anemia due to chronic kidney disease     • Essential hypertension     • Type 2 diabetes mellitus with hyperglycemia, with long-term current use of insulin ([facility])        Plan:   Admit to medical floor observation  Vitals every 4 hours  Cardiac, diabetic, renal  diet with fluid restriction  IVF saline lock  Routine labs  Bumex drip diuresis  Nephrology consult  Local wound care for amputation site     Humalog sliding scale  Baseline insulin Levemir 50 Unit BID      Home medications reconciliation     DVT prophylaxis > Lovenox 40 mg SQ daily        Code Status/Advanced Care Plan: Full     The patient's surrogate decision maker is wife, see records.      I discussed my findings and recommendations with the patient and wife at bedside.     Estimated length of stay is over 2 midnights.      The patient was seen and examined by me on 12/08/2021 at 2233.     Electronically signed by Garrett Alicia MD, 12/09/21, 22:33 CST.                                ED to Hosp-Admission (Current) on 12/8/2021     ED to Hosp-Admission (Current) on 12/8/2021        Ref Range & Units 05:19   (12/9/21) 1 d ago   (12/8/21) 1 mo ago   (11/7/21) 1 mo ago   (10/28/21) 3 mo ago   (8/23/21) 7 mo ago   (5/11/21) 7 mo ago   (5/10/21)   WBC   3.40 - 10.80 10*3/mm3 7.32  6.99  8.08  9.16  10.34  5.62  6.33    RBC   4.14 - 5.80 10*6/mm3 3.35 Low   3.32 Low   3.49 Low   3.50 Low   3.67 Low   3.64 Low   3.89 Low     Hemoglobin   13.0 - 17.7 g/dL 9.1 Low   9.1 Low   9.8 Low   10.1 Low   11.0 Low   10.7 Low   11.2 Low     Hematocrit   37.5 - 51.0 % 30.0 Low   29.7 Low   30.3 Low   31.4 Low   32.3 Low   32.0 Low   34.5 Low     MCV   79.0 - 97.0 fL 89.6  89.5  86.8  89.7  88.0  87.9  88.7    MCH   26.6 - 33.0 pg 27.2  27.4  28.1  28.9  30.0  29.4  28.8    MCHC   31.5 - 35.7 g/dL 30.3 Low   30.6 Low   32.3  32.2  34.1  33.4  32.5    RDW   12.3 - 15.4 % 13.9  13.8  14.1  14.2  13.8  13.3  13.5    RDW-SD   37.0 - 54.0 fl 45.1  45.0  44.2  46.0  44.6  42.9  44.0    MPV   6.0 - 12.0 fL 11.2  10.6  10.7  10.2  10.4  11.6  11.3    Platelets   140 - 450 10*3/mm3 207  206  300  395  229  225  233    Neutrophil %   42.7 - 76.0 % 71.1  65.9  65.2  70.1  58.0  57.1     Lymphocyte %   19.6 - 45.3 % 14.9 Low    17.9 Low   16.7 Low   12.8 Low   24.4  26.2     Monocyte %   5.0 - 12.0 % 10.0  10.3  9.3  8.4  10.5  10.5     Eosinophil %   0.3 - 6.2 % 3.0  4.9  7.7 High   6.8 High   6.2  5.3     Basophil %   0.0 - 1.5 % 0.3  0.4  0.7  0.5  0.5  0.5     Immature Grans %   0.0 - 0.5 % 0.7 High   0.6 High   0.4  1.4 High   0.4  0.4     Neutrophils, Absolute   1.70 - 7.00 10*3/mm3 5.21  4.61  5.27  6.42  6.00  3.21     Lymphocytes, Absolute   0.70 - 3.10 10*3/mm3 1.09            Basic Metabolic Panel  Order: [id number]   Status: Final result     Visible to patient: No (scheduled for 12/9/2021  8:13 PM)     Next appt: 12/13/2021 at 09:30 AM in Pre-Admission Testing ([facility] PAD PAT 30)    Specimen Information: Blood         0 Result Notes    Component   Ref Range & Units 05:19   (12/9/21) 00:35   (12/9/21) 1 d ago   (12/8/21) 1 mo ago   (11/7/21) 1 mo ago   (11/7/21) 1 mo ago   (10/28/21) 3 mo ago   (8/23/21)   Glucose   65 - 99 mg/dL 127 High   78 R, CM  276 High   335 High  R, CM  424 High Critical   209 High   142 High     BUN   8 - 23 mg/dL 37 High    36 High    33 High   26 High   45 High     Creatinine   0.76 - 1.27 mg/dL 2.01 High    2.09 High    1.66 High   1.64 High   2.36 High     Sodium   136 - 145 mmol/L 138   137   136  136  137    Potassium   3.5 - 5.2 mmol/L 3.8   4.1   4.0  5.4 High   5.4 High     Chloride   98 - 107 mmol/L 99   98   101  102  104    CO2   22.0 - 29.0 mmol/L 29.0   27.0   22.0  23.0  22.0    Calcium   8.6 - 10.5 mg/dL 8.6   8.6   8.7  9.0  9.1    eGFR Non African Amer   >60 mL/min/1.73 34 Low    32 Low    42 Low   42 Low   28 Low     BUN/Creatinine Ratio   7.0 - 25.0 18.4   17.2   19.9  15.9  19.1    Anion Gap   5.0 - 15.0 mmol/L 10.0               Date/Time Temp Pulse Resp BP Patient Position Device (Oxygen Therapy) SpO2   12/09/21 0823 -- -- -- -- -- room air --   12/09/21 0700 97.6 (36.4) 65 16 158/82 Sitting room air 99   12/09/21 0305 98.6 (37) 72 16 153/79 Lying room air 96   12/09/21 0016 -- 82 --  176/82 -- -- 94   12/09/21 0001 -- 80 -- 150/81 -- -- 97   12/08/21 2348 -- 80 -- 155/68 -- -- --   12/08/21 2133 -- 80 -- -- -- -- 92   12/08/21 2118                  Current Facility-Administered Medications   Medication Dose Route Frequency Provider Last Rate Last Admin   • acetaminophen (TYLENOL) tablet 650 mg  650 mg Oral Q4H PRN Garrett Alicai MD   650 mg at 12/09/21 0316   • aspirin EC tablet 81 mg  81 mg Oral Daily Garrett Alicia MD   81 mg at 12/09/21 0823   • bumetanide (BUMEX) 10 mg in sodium chloride 0.9 % 100 mL (0.1 mg/mL) infusion  1 mg/hr Intravenous Continuous Garrett Alicia MD 10 mL/hr at 12/09/21 0127 1 mg/hr at 12/09/21 0127   • busPIRone (BUSPAR) tablet 10 mg  10 mg Oral TID Garrett Alicia MD   10 mg at 12/09/21 0824   • carvedilol (COREG) tablet 6.25 mg  6.25 mg Oral BID Garrett Alicia MD   6.25 mg at 12/09/21 0823   • cyclobenzaprine (FLEXERIL) tablet 5 mg  5 mg Oral BID PRN Garrett Alicia MD       • dextrose (D50W) (25 g/50 mL) IV injection 25 g  25 g Intravenous Q15 Min PRN Garrett Alicia MD       • dextrose (GLUTOSE) oral gel 15 g  15 g Oral Q15 Min PRN Garrett Alicia MD       • donepezil (ARICEPT) tablet 5 mg  5 mg Oral Daily Garrett Alicia MD   5 mg at 12/09/21 0823   • DULoxetine (CYMBALTA) DR capsule 60 mg  60 mg Oral Daily Garrett Alicia MD   60 mg at 12/09/21 0824   • enoxaparin (LOVENOX) syringe 40 mg  40 mg Subcutaneous Q12H Garrett Alicia MD   40 mg at 12/09/21 0823   • gabapentin (NEURONTIN) capsule 600 mg  600 mg Oral TID Garrett Alicia MD   600 mg at 12/09/21 0823   • glucagon (human recombinant) (GLUCAGEN DIAGNOSTIC) injection 1 mg  1 mg Subcutaneous Q15 Min PRN Garrett Alicia MD       • influenza vac split quad (FLUZONE,FLUARIX,AFLURIA,FLULAVAL) injection 0.5 mL  0.5 mL Intramuscular During Hospitalization Garrett Alicia MD       • insulin  detemir (LEVEMIR) injection 50 Units  50 Units Subcutaneous BID Garrett Alicia MD   50 Units at 12/09/21 0824   • insulin lispro (humaLOG) injection 0-9 Units  0-9 Units Subcutaneous 4x Daily With Meals & Nightly Garrett Alicia MD       • ipratropium-albuterol (DUO-NEB) nebulizer solution 3 mL  3 mL Nebulization Q6H Garrett Alicia MD       • pantoprazole (PROTONIX) EC tablet 40 mg  40 mg Oral Q AM Garrett Alicia MD   40 mg at 12/09/21 0823   • rosuvastatin (CRESTOR) tablet 40 mg  40 mg Oral Nightly Garrett Alicia MD       • sodium chloride 0.9 % flush 10 mL  10 mL Intravenous PRN Garrett Alicia MD       • sodium chloride 0.9 % flush 10 mL  10 mL Intravenous PRN Garrett Alicia MD       • sodium chloride 0.9 % flush 10 mL  10 mL Intravenous Q12H Garrett Alicia MD   10 mL at 12/09/21 0823   • sodium chloride 0.9 % flush 10 mL  10 mL Intravenous PRN Garrett Alicia MD       • sucralfate (CARAFATE) tablet 1 g  1 g Oral 4x Daily AC & at Bedtime Garrett Alicia MD       • tamsulosin (FLOMAX) 24 hr capsule 0.4 mg  0.4 mg Oral Daily Garrett Alicia MD   0.4 mg at 12/09/21 0823   • traZODone (DESYREL) tablet 100 mg  100 mg Oral Nightly Garrett Alicia MD

## 2021-12-09 NOTE — H&P
[facility] Medicine Services  HISTORY AND PHYSICAL    Date of Admission: 12/8/2021  Primary Care Physician: Del Shetty MD    Subjective     Chief Complaint: Leg swelling    History of Present Illness  66 year old male with PMH of CAD, DM ID, HTN, CKD, BRITTNI, that presents to the ER with complaints of leg edema and fluid retention. He has tried Bumex or and Metolazone recently added but continues to have worsening edema and weight wain. Tonight he felt short of breath with activity and pulse ox at home was read as low 80%, he did not present hypoxemic to to the ER or distressed.     Review of Systems   Otherwise complete ROS reviewed and negative except as mentioned in the HPI.    Past Medical History:   Past Medical History:   Diagnosis Date   • Arthritis    • Autonomic disease    • CAD (coronary artery disease) 2/6/2017   • Coronary artery disease    • Diabetes mellitus (HCC)    • Gastroesophageal reflux disease 5/13/2019   • HTN (hypertension), benign 5/3/2017   • Hyperlipidemia    • Hypertension    • Mixed hyperlipidemia 2/7/2017   • Multiple lung nodules 1/26/2020    5mm, 9 mm RLL identified 1/2020, not present 10/2019.   • Myocardial infarction (HCC)    • Osteomyelitis (HCC) 1/22/2020   • Osteomyelitis of fifth toe of right foot (HCC) 10/7/2019   • Pancreatitis    • Persistent insomnia 1/20/2020   • Renal disorder    • Sleep apnea 2/6/2017   • Sleep apnea with use of continuous positive airway pressure (CPAP)    • Slow transit constipation 1/16/2019     Past Surgical History:  Past Surgical History:   Procedure Laterality Date   • ABDOMINAL SURGERY     • APPENDECTOMY     • BACK SURGERY     • CARDIAC CATHETERIZATION Left 2/8/2021    Procedure: Left Heart Cath w poss intervention left anatomical snuff box acess;  Surgeon: Omkar Charles DO;  Location:  [facility] CATH IN[facility]ASIVE LOC[facility];  Service: Cardiology;  Laterality: Left;   • CARDIAC SURGERY     • CATARACT  "EXTRACTION     • CERVICAL SPINE SURGERY     • COLONOSCOPY N/A 1/31/2017    Normal exam repeat in 5 years   • COLONOSCOPY N/A 2/11/2019    Mild acute inflammation   • COLONOSCOPY W/ POLYPECTOMY  03/04/2014    Hyperplastic polyp   • CORONARY ARTERY BYPASS GRAFT  10/2015   • ENDOSCOPY  04/13/2011    Gastritis with hemorrhage   • ENDOSCOPY N/A 5/5/2017    Normal exam   • ENDOSCOPY N/A 2/11/2019    Gastritis   • ENDOSCOPY N/A 9/1/2020    Non-erosive gastritis with hemorrhage   • ENDOSCOPY N/A 2/10/2021    Esophagitis   • INCISION AND DRAINAGE OF WOUND Left 09/2007    spider bite     Social History:  reports that he quit smoking about 30 years ago. He has never used smokeless tobacco. He reports that he does not drink alcohol and does not use drugs.    Family History: family history includes Alzheimer's disease in his mother; Colon cancer in his father and sister; Colon polyps in his sister; Coronary artery disease in his sister and sister; Heart disease in his father.       Allergies:  Allergies   Allergen Reactions   • Cefepime Hives and Anaphylaxis   • Bactrim [Sulfamethoxazole-Trimethoprim] Other (See Comments)     \"RENAL FAILURE\"   • Clindamycin Itching   • Vancomycin Itching   • Metronidazole Rash       Medications:  Prior to Admission medications    Medication Sig Start Date End Date Taking? Authorizing Provider   aspirin 81 MG EC tablet Take 81 mg by mouth Daily.    Provider, MD Bossman   bumetanide (BUMEX) 1 MG tablet Take 1 mg by mouth Daily.    Provider, MD Bossman   bumetanide (BUMEX) 1 MG tablet Take 1 tablet by mouth 2 (Two) Times a Day. 11/30/21      busPIRone (BUSPAR) 10 MG tablet Take 1 tablet 3 times a day by oral route as needed. 10/28/21      carvedilol (COREG) 3.125 MG tablet Take 1 tablet by mouth 2 (Two) Times a Day With Meals. 5/3/21   Tmibo Yeboah MD   carvedilol (COREG) 6.25 MG tablet Take 1 tablet by mouth 2 (Two) Times a Day. 11/12/21      cefepime 2 g in 100 mL NS Infuse 2 g " into a venous catheter Every 12 (Twelve) Hours.    Provider, MD Bossman   colchicine 0.6 MG tablet Take 1 tablet by mouth 2 (two) times a day. 8/26/21      colchicine 0.6 MG tablet Take 1 tablet by mouth 2 (two) times a day. 11/1/21      cyclobenzaprine (FLEXERIL) 5 MG tablet Take 1 tablet by mouth 2 (Two) Times a Day As Needed for muscle spasms 4/14/21      donepezil (ARICEPT) 5 MG tablet Take 1 tablet by mouth Daily. 11/8/21      doxycycline (MONODOX) 100 MG capsule Take 1 capsule by mouth 2 (Two) Times a Day. 10/29/21      Dulaglutide (Trulicity) 1.5 MG/0.5ML solution pen-injector Inject 1.5 mg under the skin into the appropriate area as directed 1 (One) Time Per Week. 11/18/21   Dylan Figueroa APRN   Dulaglutide 1.5 MG/0.5ML solution pen-injector Inject 1.5 mg under the skin into the appropriate area as directed 1 (One) Time Per Week. 8/18/21   Dylan Figueroa APRN   DULoxetine (CYMBALTA) 60 MG capsule Take 60 mg by mouth Daily.    Provider, MD Bossman   DULoxetine (CYMBALTA) 60 MG capsule Take 1 capsule by mouth Daily. 11/8/21      Finerenone (Kerendia) 10 MG tablet Take 10 mg by mouth Daily. 11/30/21      gabapentin (NEURONTIN) 300 MG capsule Take 2 capsules by mouth every morning and 3 capsules every night at bedtime. 9/8/21      gabapentin (NEURONTIN) 600 MG tablet Take 1 tablet by mouth 3 (Three) Times a Day. 11/15/21      HYDROcodone-acetaminophen (NORCO) 7.5-325 MG per tablet Take 1 tablet by mouth Every 6 (Six) Hours as needed. 10/27/21      HYDROcodone-acetaminophen (NORCO) 7.5-325 MG per tablet Take 1 tablet by mouth 2 (Two) Times a Day As Needed. 11/30/21      Insulin Lispro, 1 Unit Dial, (HumaLOG KwikPen) 100 UNIT/ML solution pen-injector Inject according to sliding scale:  glucose=151-200 use 2 UNITS; glucose=201-250 use 4 units; glucose=251-300 use 6 units; above 301 use 8 units 1/28/21   Dylan Figueroa APRN   Insulin Regular Human, Conc, (HumuLIN R U-500 KwikPen)  500 UNIT/ML solution pen-injector CONCENTRATED injection Inject 120 units under the skin into the appropriate area as directed Every Morning with Breakfast  units Every Evening with Supper. 8/18/21   Dylan Figueroa APRN   Insulin Regular Human, Conc, (HumuLIN R U-500 KwikPen) 500 UNIT/ML solution pen-injector CONCENTRATED injection Inject 125 Units under the skin into the appropriate area as directed 2 (Two) Times a Day Before Meals. 10/27/21      ipratropium-albuterol (DUO-NEB) 0.5-2.5 mg/3 ml nebulizer Take 3 mL by nebulization Every 6 (Six) Hours. 10/27/21      irbesartan (AVAPRO) 300 MG tablet Take 1 tablet by mouth Daily. 10/28/21      irbesartan (AVAPRO) 300 MG tablet Take 1 tablet by mouth Daily. 10/28/21      levoFLOXacin (LEVAQUIN) 500 MG tablet Take 1 tablet by mouth Daily. 10/29/21      metOLazone (ZAROXOLYN) 2.5 MG tablet Take 1 tablet by mouth Daily. 12/7/21      Naloxegol Oxalate (Movantik) 25 MG tablet Take 1 tablet by mouth every day. 5/17/21      ondansetron ODT (ZOFRAN-ODT) 4 MG disintegrating tablet Place 1 tablet on tongue three times a day as needed. 10/8/21      oxyCODONE-acetaminophen (PERCOCET)  MG per tablet Take 1 tablet by mouth 3 (Three) Times a Day As Needed. 9/18/21      oxyCODONE-acetaminophen (PERCOCET)  MG per tablet Take 1 tablet by mouth three times a day as needed 10/8/21      pantoprazole (PROTONIX) 40 MG EC tablet Take 40 mg by mouth Daily.    Provider, MD Bossman   pantoprazole (PROTONIX) 40 MG EC tablet Take 1 tablet by mouth twice a day. 9/22/21      prazosin (MINIPRESS) 2 MG capsule Take 1 capsule by mouth every night at bedtime. 11/12/21      rosuvastatin (CRESTOR) 40 MG tablet Take 1 tablet by mouth Every Night. 11/30/21      sodium hypochlorite (DAKIN'S) 0.25 % topical solution Apply to wound twice a day as directed 11/22/21      sucralfate (CARAFATE) 1 g tablet Take 1 tablet by mouth 4 (Four) Times a Day before meals 12/15/20   Diogenes  "Del PARDO MD   tamsulosin (FLOMAX) 0.4 MG capsule 24 hr capsule Take 1 capsule by mouth Daily.    Provider, MD Bossman   traZODone (DESYREL) 100 MG tablet Take 1 tablet by mouth Every Night. 11/23/20      spironolactone (ALDACTONE) 25 MG tablet Take 1 tablet by mouth Daily. 9/28/20 12/31/20  Del Shetty MD     I have utilized all available immediate resources to obtain, update, and review the patient's current medications.    Objective     Vital Signs: /53 (BP Location: Right arm, Patient Position: Sitting)   Pulse 80   Temp 98 °F (36.7 °C) (Oral)   Resp 22   Ht 182.9 cm (72\")   Wt (!) 153 kg (337 lb 15.4 oz)   SpO2 92%   BMI 45.84 kg/m²   Physical Exam  Constitutional:       Appearance: He is well-developed. He is not ill-appearing or diaphoretic.   HENT:      Head: Normocephalic and atraumatic.      Right Ear: External ear normal.      Left Ear: External ear normal.      Nose: Nose normal.      Mouth/Throat:      Mouth: Mucous membranes are dry.   Eyes:      General:         Right eye: No discharge.         Left eye: No discharge.      Extraocular Movements: Extraocular movements intact.      Conjunctiva/sclera: Conjunctivae normal.      Pupils: Pupils are equal, round, and reactive to light.   Neck:      Vascular: No JVD.   Cardiovascular:      Rate and Rhythm: Normal rate and regular rhythm.      Heart sounds: Normal heart sounds. No murmur heard.      Pulmonary:      Effort: Pulmonary effort is normal. No respiratory distress.      Breath sounds: Normal breath sounds. No wheezing or rales.   Chest:      Chest wall: No tenderness.   Abdominal:      General: Bowel sounds are normal. There is no distension.      Palpations: Abdomen is soft.      Tenderness: There is no abdominal tenderness. There is no guarding or rebound.   Musculoskeletal:         General: No tenderness or deformity. Normal range of motion.      Cervical back: Normal range of motion and neck supple. No rigidity.      " Right lower leg: Edema present.      Left lower leg: Edema present.   Skin:     General: Skin is warm and dry.      Findings: No rash.   Neurological:      General: No focal deficit present.      Mental Status: He is alert and oriented to person, place, and time. Mental status is at baseline.      Cranial Nerves: No cranial nerve deficit.      Sensory: No sensory deficit.      Motor: No abnormal muscle tone.      Deep Tendon Reflexes: Reflexes normal.   Psychiatric:         Mood and Affect: Mood normal.         Behavior: Behavior normal.     Edema in lowed abdominal wall to feet, 3+ pitting, tense.   Left 5th toe amputation, see media             Results Reviewed:  Lab Results (last 24 hours)     Procedure Component Value Units Date/Time    [place] Draw [[id number]] Collected: 12/08/21 2041    Specimen: Blood Updated: 12/08/21 2145    Narrative:      The following orders were created for panel order [place] Draw.  Procedure                               Abnormality         Status                     ---------                               -----------         ------                     Green Top (Gel)[[id number]]                                  Final result               Lavender Top[[id number]]                                     Final result               Red Top[[id number]]                                          Final result               Gray Top[[id number]]                                         In process                 Light Blue Top[[id number]]                                   Final result                 Please view results for these tests on the individual orders.    Green Top (Gel) [[id number]] Collected: 12/08/21 2041    Specimen: Blood Updated: 12/08/21 2145     Extra Tube Hold for add-ons.     Comment: Auto resulted.       Lavender Top [[id number]] Collected: 12/08/21 2041    Specimen: Blood Updated: 12/08/21 2145     Extra Tube hold for add-on     Comment: Auto resulted       Red Top [[id number]]  Collected: 12/08/21 2041    Specimen: Blood Updated: 12/08/21 2145     Extra Tube Hold for add-ons.     Comment: Auto resulted.       Light Blue Top [[id number]] Collected: 12/08/21 2041    Specimen: Blood Updated: 12/08/21 2145     Extra Tube hold for add-on     Comment: Auto resulted       Urinalysis With Microscopic If Indicated (No Culture) - Urine, Clean Catch [[id number]]  (Abnormal) Collected: 12/08/21 2112    Specimen: Urine, Clean Catch Updated: 12/08/21 2139     Color, UA Yellow     Appearance, UA Clear     pH, UA 6.5     Specific Gravity, UA 1.019     Glucose, UA >=1000 mg/dL (3+)     Ketones, UA Negative     Bilirubin, UA Negative     Blood, UA Negative     Protein, UA 30 mg/dL (1+)     Leuk Esterase, UA Negative     Nitrite, UA Negative     Urobilinogen, UA 1.0 E.U./dL    Urinalysis, Microscopic Only - Urine, Clean Catch [[id number]] Collected: 12/08/21 2112    Specimen: Urine, Clean Catch Updated: 12/08/21 2139     RBC, UA None Seen /HPF      WBC, UA None Seen /HPF      Bacteria, UA None Seen /HPF      Squamous Epithelial Cells, UA 0-2 /HPF      Methodology Manual Light Microscopy    Comprehensive Metabolic Panel [[id number]]  (Abnormal) Collected: 12/08/21 2041    Specimen: Blood Updated: 12/08/21 2118     Glucose 276 mg/dL      BUN 36 mg/dL      Creatinine 2.09 mg/dL      Sodium 137 mmol/L      Potassium 4.1 mmol/L      Chloride 98 mmol/L      CO2 27.0 mmol/L      Calcium 8.6 mg/dL      Total Protein 6.7 g/dL      Albumin 3.70 g/dL      ALT (SGPT) 11 U/L      AST (SGOT) 15 U/L      Alkaline Phosphatase 109 U/L      Total Bilirubin 0.4 mg/dL      eGFR Non African Amer 32 mL/min/1.73      Globulin 3.0 gm/dL      A/G Ratio 1.2 g/dL      BUN/Creatinine Ratio 17.2     Anion Gap 12.0 mmol/L     Narrative:      GFR Normal >60  Chronic Kidney Disease <60  Kidney Failure <15      Troponin [[id number]]  (Normal) Collected: 12/08/21 2041    Specimen: Blood Updated: 12/08/21 2114     Troponin T <0.010 ng/mL      Narrative:      Troponin T Reference Range:  <= 0.03 ng/mL-   Negative for AMI  >0.03 ng/mL-     Abnormal for myocardial necrosis.  Clinicians would have to utilize clinical acumen, EKG, Troponin and serial changes to determine if it is an Acute Myocardial Infarction or myocardial injury due to an underlying chronic condition.       Results may be falsely decreased if patient taking Biotin.      BNP [[id number]]  (Abnormal) Collected: 12/08/21 2041    Specimen: Blood Updated: 12/08/21 2114     proBNP 1,308.0 pg/mL     Narrative:      Among patients with dyspnea, NT-proBNP is highly sensitive for the detection of acute congestive heart failure. In addition NT-proBNP of <300 pg/ml effectively rules out acute congestive heart failure with 99% negative predictive value.    Results may be falsely decreased if patient taking Biotin.      Magnesium [[id number]]  (Normal) Collected: 12/08/21 2041    Specimen: Blood Updated: 12/08/21 2112     Magnesium 2.2 mg/dL     CBC & Differential [[id number]]  (Abnormal) Collected: 12/08/21 2041    Specimen: Blood Updated: 12/08/21 2056    Narrative:      The following orders were created for panel order CBC & Differential.  Procedure                               Abnormality         Status                     ---------                               -----------         ------                     CBC Auto Differential[[id number]]        Abnormal            Final result                 Please view results for these tests on the individual orders.    CBC Auto Differential [[id number]]  (Abnormal) Collected: 12/08/21 2041    Specimen: Blood Updated: 12/08/21 2056     WBC 6.99 10*3/mm3      RBC 3.32 10*6/mm3      Hemoglobin 9.1 g/dL      Hematocrit 29.7 %      MCV 89.5 fL      MCH 27.4 pg      MCHC 30.6 g/dL      RDW 13.8 %      RDW-SD 45.0 fl      MPV 10.6 fL      Platelets 206 10*3/mm3      Neutrophil % 65.9 %      Lymphocyte % 17.9 %      Monocyte % 10.3 %      Eosinophil % 4.9 %      Basophil  % 0.4 %      Immature Grans % 0.6 %      Neutrophils, Absolute 4.61 10*3/mm3      Lymphocytes, Absolute 1.25 10*3/mm3      Monocytes, Absolute 0.72 10*3/mm3      Eosinophils, Absolute 0.34 10*3/mm3      Basophils, Absolute 0.03 10*3/mm3      Immature Grans, Absolute 0.04 10*3/mm3      nRBC 0.0 /100 WBC     [place] [[id number]] Collected: 12/08/21 2041    Specimen: Blood Updated: 12/08/21 2053        Imaging Results (Last 24 Hours)     Procedure Component Value Units Date/Time    XR Chest 1 View [[id number]] Collected: 12/08/21 2056     Updated: 12/08/21 2101    Narrative:      EXAMINATION: XR CHEST 1 VW- 12/8/2021 8:56 PM CST     HISTORY: SOB.     REPORT: Frontal view the chest was obtained.     COMPARISON: Chest x-ray 11/7/2021.     The lungs are grossly hypoaerated, there are central basilar vascular  crowding, with borderline cardiomegaly. Scattered calcified granulomas  are present. There is no lung consolidation. Previous CABG is evident.  No pneumothorax or pleural effusion is identified. There is previous  ACDF.       Impression:      Gross hypoaeration of the lungs with central and basilar  vascular crowding, no overt evidence of CHF or pneumonia.  This report was finalized on 12/08/2021 20:58 by Dr. Percy Cesar MD.        I have personally reviewed and interpreted the radiology studies and ECG obtained at time of admission.     Assessment / Plan     Assessment:   Active Hospital Problems    Diagnosis    • **Anasarca    • Hx of CABG    • Sleep apnea with use of continuous positive airway pressure (CPAP)    • Class 3 severe obesity due to excess calories with body mass index (BMI) of 45.0 to 49.9 in adult ([facility])    • Anemia due to chronic kidney disease    • Essential hypertension    • Type 2 diabetes mellitus with hyperglycemia, with long-term current use of insulin ([facility])      Plan:   Admit to medical floor observation  Vitals every 4 hours  Cardiac, diabetic, renal diet with fluid restriction  IVF saline  lock  Routine labs  Bumex drip diuresis  Nephrology consult  Local wound care for amputation site    Humalog sliding scale  Baseline insulin Levemir 50 Unit BID     Home medications reconciliation    DVT prophylaxis > Lovenox 40 mg SQ daily      Code Status/Advanced Care Plan: Full    The patient's surrogate decision maker is wife, see records.     I discussed my findings and recommendations with the patient and wife at bedside.    Estimated length of stay is over 2 midnights.     The patient was seen and examined by me on 12/08/2021 at 2233.    Electronically signed by Garrett Alicia MD, 12/09/21, 22:33 CST.

## 2021-12-09 NOTE — PROGRESS NOTES
[facility] Medicine Services  INPATIENT PROGRESS NOTE    Patient Name: Erick Luong  Date of Admission: 12/8/2021  Today's Date: 12/09/21  Length of Stay: 1  Primary Care Physician: Del Shetty MD    Subjective   Chief Complaint: Shortness of breath  HPI   He has had multiple hospitalizations for similar issues.  Reported 3-week history of approximate 35 pound weight gain.  He was seen earlier in office by SUSAN Santo and noted to be significantly volume overloaded.  He had approximate 20 pound weight gain creatinine was noted to be 2.0.  He was taking Bumex 1 mg twice daily in addition to metolazone.  Chest x-ray showed central and basilar vascular crowding, no overt evidence of CHF or pneumonia.    He was sitting up in the chair resting comfortably.  States his breathing feels slightly better today.  He is tolerating room air.  He is -5 L since admission.  Nephrology recommends to continue Bumex drip at this time.    Review of Systems   All pertinent negatives and positives are as above. All other systems have been reviewed and are negative unless otherwise stated.     Objective    Temp:  [97.6 °F (36.4 °C)-98.6 °F (37 °C)] 97.8 °F (36.6 °C)  Heart Rate:  [65-94] 67  Resp:  [16-22] 16  BP: (139-176)/(53-82) 139/67  Physical Exam  Vitals reviewed.   Constitutional:       General: He is not in acute distress.     Appearance: He is morbidly obese. He is ill-appearing. He is not toxic-appearing.      Comments: Sitting up in the chair.  No acute distress.  Tolerating room air.  Discussed with his nurse Sania at bedside.   HENT:      Head: Normocephalic and atraumatic.      Mouth/Throat:      Mouth: Mucous membranes are moist.      Pharynx: Oropharynx is clear.   Eyes:      Extraocular Movements: Extraocular movements intact.      Conjunctiva/sclera: Conjunctivae normal.      Pupils: Pupils are equal, round, and reactive to light.   Cardiovascular:      Rate and  Rhythm: Normal rate and regular rhythm.      Pulses: Normal pulses.      Heart sounds: No murmur heard.       Comments: Sinus 64-66  Pulmonary:      Effort: Pulmonary effort is normal. No respiratory distress.      Breath sounds: Normal breath sounds. No wheezing.   Abdominal:      General: Bowel sounds are normal. There is distension.      Palpations: Abdomen is soft.      Tenderness: There is no abdominal tenderness.   Musculoskeletal:         General: No tenderness. Normal range of motion.      Cervical back: Normal range of motion and neck supple. No muscular tenderness.      Right lower leg: Edema present.      Left lower leg: Edema present.   Skin:     General: Skin is warm and dry.      Findings: No erythema or rash.   Neurological:      General: No focal deficit present.      Mental Status: He is alert and oriented to person, place, and time.      Cranial Nerves: No cranial nerve deficit.      Motor: No weakness.   Psychiatric:         Mood and Affect: Mood normal.         Behavior: Behavior normal.       Results Review:  I have reviewed the labs, radiology results, and diagnostic studies.    Laboratory Data:   Results from last 7 days   Lab Units 12/09/21  0519 12/08/21 2041   WBC 10*3/mm3 7.32 6.99   HEMOGLOBIN g/dL 9.1* 9.1*   HEMATOCRIT % 30.0* 29.7*   PLATELETS 10*3/mm3 207 206        Results from last 7 days   Lab Units 12/09/21  0519 12/08/21 2041   SODIUM mmol/L 138 137   POTASSIUM mmol/L 3.8 4.1   CHLORIDE mmol/L 99 98   CO2 mmol/L 29.0 27.0   BUN mg/dL 37* 36*   CREATININE mg/dL 2.01* 2.09*   CALCIUM mg/dL 8.6 8.6   BILIRUBIN mg/dL  --  0.4   ALK PHOS U/L  --  109   ALT (SGPT) U/L  --  11   AST (SGOT) U/L  --  15   GLUCOSE mg/dL 127* 276*     Radiology Data:   Imaging Results (Last 24 Hours)     Procedure Component Value Units Date/Time    XR Chest 1 View [[id number]] Collected: 12/08/21 2056     Updated: 12/08/21 2101    Narrative:      EXAMINATION: XR CHEST 1 VW- 12/8/2021 8:56 PM CST      HISTORY: SOB.     REPORT: Frontal view the chest was obtained.     COMPARISON: Chest x-ray 11/7/2021.     The lungs are grossly hypoaerated, there are central basilar vascular  crowding, with borderline cardiomegaly. Scattered calcified granulomas  are present. There is no lung consolidation. Previous CABG is evident.  No pneumothorax or pleural effusion is identified. There is previous  ACDF.       Impression:      Gross hypoaeration of the lungs with central and basilar  vascular crowding, no overt evidence of CHF or pneumonia.  This report was finalized on 12/08/2021 20:58 by Dr. Percy Cesar MD.          I have reviewed the patient's current medications.     Assessment/Plan     Active Hospital Problems    Diagnosis    • **Anasarca    • Acute kidney injury superimposed on chronic kidney disease IIIb(HCC)    • Hx of CABG    • Sleep apnea with use of continuous positive airway pressure (CPAP)    • Class 3 severe obesity due to excess calories with body mass index (BMI) of 45.0 to 49.9 in adult ([facility])    • Anemia due to chronic kidney disease    • Essential hypertension    • Type 2 diabetes mellitus with hyperglycemia, with long-term current use of insulin ([facility])      Plan:  1.  Nephrology, Dr. Arteaga evaluating patient for chronic kidney disease stage IIIb with anasarca.  Baseline creatinine 1.6.  Creatinine in the emergency department noted to be 2.09.  Bumex drip at 1 mg/hour.      2.  1500 mL fluid restriction.      3.  Wound care, APRN consulted.    4.  Hemoglobin A1c 9.1.  Continue sliding scale insulin and Levemir.    5.  Lovenox for VTE prophylaxis.    6.  BMP in AM.    Discharge Planning: Indeterminate at present.    Electronically signed by SUSAN Roger, 12/09/21, 14:27 CST.

## 2021-12-09 NOTE — PLAN OF CARE
Goal Outcome Evaluation:  Plan of Care Reviewed With: (P) patient           Outcome Summary: (P) VSS. Patient started on bumex gtt at 10ml/hr. 1500 ml fluid restriction in place. Bandage on left foot wound removed and a new wet-to-dry bandage applied. Patient remains on RA. C/o headache and neck pain. Tylenol administered.

## 2021-12-10 LAB
ANION GAP SERPL CALCULATED.3IONS-SCNC: 9 MMOL/L (ref 5–15)
BASOPHILS # BLD AUTO: 0.04 10*3/MM3 (ref 0–0.2)
BASOPHILS NFR BLD AUTO: 0.6 % (ref 0–1.5)
BUN SERPL-MCNC: 44 MG/DL (ref 8–23)
BUN/CREAT SERPL: 19.7 (ref 7–25)
CALCIUM SPEC-SCNC: 8.8 MG/DL (ref 8.6–10.5)
CHLORIDE SERPL-SCNC: 95 MMOL/L (ref 98–107)
CO2 SERPL-SCNC: 35 MMOL/L (ref 22–29)
CREAT SERPL-MCNC: 2.23 MG/DL (ref 0.76–1.27)
DEPRECATED RDW RBC AUTO: 44.4 FL (ref 37–54)
EOSINOPHIL # BLD AUTO: 0.42 10*3/MM3 (ref 0–0.4)
EOSINOPHIL NFR BLD AUTO: 6.7 % (ref 0.3–6.2)
ERYTHROCYTE [DISTWIDTH] IN BLOOD BY AUTOMATED COUNT: 13.6 % (ref 12.3–15.4)
GFR SERPL CREATININE-BSD FRML MDRD: 30 ML/MIN/1.73
GLUCOSE BLDC GLUCOMTR-MCNC: 171 MG/DL (ref 70–130)
GLUCOSE BLDC GLUCOMTR-MCNC: 256 MG/DL (ref 70–130)
GLUCOSE BLDC GLUCOMTR-MCNC: 258 MG/DL (ref 70–130)
GLUCOSE BLDC GLUCOMTR-MCNC: 333 MG/DL (ref 70–130)
GLUCOSE BLDC GLUCOMTR-MCNC: 339 MG/DL (ref 70–130)
GLUCOSE SERPL-MCNC: 213 MG/DL (ref 65–99)
HCT VFR BLD AUTO: 31.4 % (ref 37.5–51)
HGB BLD-MCNC: 9.9 G/DL (ref 13–17.7)
IMM GRANULOCYTES # BLD AUTO: 0.02 10*3/MM3 (ref 0–0.05)
IMM GRANULOCYTES NFR BLD AUTO: 0.3 % (ref 0–0.5)
LYMPHOCYTES # BLD AUTO: 1.62 10*3/MM3 (ref 0.7–3.1)
LYMPHOCYTES NFR BLD AUTO: 25.9 % (ref 19.6–45.3)
MCH RBC QN AUTO: 28 PG (ref 26.6–33)
MCHC RBC AUTO-ENTMCNC: 31.5 G/DL (ref 31.5–35.7)
MCV RBC AUTO: 88.7 FL (ref 79–97)
MONOCYTES # BLD AUTO: 0.77 10*3/MM3 (ref 0.1–0.9)
MONOCYTES NFR BLD AUTO: 12.3 % (ref 5–12)
NEUTROPHILS NFR BLD AUTO: 3.38 10*3/MM3 (ref 1.7–7)
NEUTROPHILS NFR BLD AUTO: 54.2 % (ref 42.7–76)
NRBC BLD AUTO-RTO: 0 /100 WBC (ref 0–0.2)
PLATELET # BLD AUTO: 227 10*3/MM3 (ref 140–450)
PMV BLD AUTO: 11.5 FL (ref 6–12)
POTASSIUM SERPL-SCNC: 4 MMOL/L (ref 3.5–5.2)
RBC # BLD AUTO: 3.54 10*6/MM3 (ref 4.14–5.8)
SODIUM SERPL-SCNC: 139 MMOL/L (ref 136–145)
WBC NRBC COR # BLD: 6.25 10*3/MM3 (ref 3.4–10.8)

## 2021-12-10 PROCEDURE — 25010000002 ENOXAPARIN PER 10 MG: Performed by: FAMILY MEDICINE

## 2021-12-10 PROCEDURE — 94799 UNLISTED PULMONARY SVC/PX: CPT

## 2021-12-10 PROCEDURE — 36415 COLL VENOUS BLD VENIPUNCTURE: CPT | Performed by: FAMILY MEDICINE

## 2021-12-10 PROCEDURE — 63710000001 INSULIN LISPRO (HUMAN) PER 5 UNITS: Performed by: FAMILY MEDICINE

## 2021-12-10 PROCEDURE — 82962 GLUCOSE BLOOD TEST: CPT

## 2021-12-10 PROCEDURE — 63710000001 INSULIN DETEMIR PER 5 UNITS: Performed by: FAMILY MEDICINE

## 2021-12-10 PROCEDURE — 80048 BASIC METABOLIC PNL TOTAL CA: CPT | Performed by: FAMILY MEDICINE

## 2021-12-10 PROCEDURE — 85025 COMPLETE CBC W/AUTO DIFF WBC: CPT | Performed by: FAMILY MEDICINE

## 2021-12-10 PROCEDURE — 99222 1ST HOSP IP/OBS MODERATE 55: CPT | Performed by: NURSE PRACTITIONER

## 2021-12-10 RX ORDER — HYDROCODONE BITARTRATE AND ACETAMINOPHEN 7.5; 325 MG/1; MG/1
1 TABLET ORAL 2 TIMES DAILY PRN
Status: DISCONTINUED | OUTPATIENT
Start: 2021-12-10 | End: 2021-12-11 | Stop reason: HOSPADM

## 2021-12-10 RX ORDER — BUMETANIDE 1 MG/1
2 TABLET ORAL
Status: DISCONTINUED | OUTPATIENT
Start: 2021-12-10 | End: 2021-12-11 | Stop reason: HOSPADM

## 2021-12-10 RX ORDER — BUMETANIDE 1 MG/1
1 TABLET ORAL
Status: DISCONTINUED | OUTPATIENT
Start: 2021-12-10 | End: 2021-12-10

## 2021-12-10 RX ORDER — IPRATROPIUM BROMIDE AND ALBUTEROL SULFATE 2.5; .5 MG/3ML; MG/3ML
3 SOLUTION RESPIRATORY (INHALATION)
Status: DISCONTINUED | OUTPATIENT
Start: 2021-12-11 | End: 2021-12-11 | Stop reason: HOSPADM

## 2021-12-10 RX ORDER — SODIUM HYPOCHLORITE 1.25 MG/ML
SOLUTION TOPICAL EVERY 12 HOURS SCHEDULED
Status: DISCONTINUED | OUTPATIENT
Start: 2021-12-10 | End: 2021-12-11 | Stop reason: HOSPADM

## 2021-12-10 RX ADMIN — SODIUM CHLORIDE, PRESERVATIVE FREE 10 ML: 5 INJECTION INTRAVENOUS at 21:52

## 2021-12-10 RX ADMIN — DULOXETINE HYDROCHLORIDE 60 MG: 30 CAPSULE, DELAYED RELEASE ORAL at 08:47

## 2021-12-10 RX ADMIN — BUSPIRONE HYDROCHLORIDE 10 MG: 10 TABLET ORAL at 08:47

## 2021-12-10 RX ADMIN — TAMSULOSIN HYDROCHLORIDE 0.4 MG: 0.4 CAPSULE ORAL at 08:47

## 2021-12-10 RX ADMIN — INSULIN DETEMIR 50 UNITS: 100 INJECTION, SOLUTION SUBCUTANEOUS at 21:52

## 2021-12-10 RX ADMIN — Medication: at 21:53

## 2021-12-10 RX ADMIN — CYCLOBENZAPRINE 5 MG: 10 TABLET, FILM COATED ORAL at 08:50

## 2021-12-10 RX ADMIN — PANTOPRAZOLE SODIUM 40 MG: 40 TABLET, DELAYED RELEASE ORAL at 05:25

## 2021-12-10 RX ADMIN — INSULIN LISPRO 6 UNITS: 100 INJECTION, SOLUTION INTRAVENOUS; SUBCUTANEOUS at 12:27

## 2021-12-10 RX ADMIN — BUSPIRONE HYDROCHLORIDE 10 MG: 10 TABLET ORAL at 21:53

## 2021-12-10 RX ADMIN — INSULIN DETEMIR 50 UNITS: 100 INJECTION, SOLUTION SUBCUTANEOUS at 09:43

## 2021-12-10 RX ADMIN — CARVEDILOL 6.25 MG: 6.25 TABLET, FILM COATED ORAL at 08:47

## 2021-12-10 RX ADMIN — ACETAMINOPHEN 650 MG: 325 TABLET, FILM COATED ORAL at 08:50

## 2021-12-10 RX ADMIN — IPRATROPIUM BROMIDE AND ALBUTEROL SULFATE 3 ML: 2.5; .5 SOLUTION RESPIRATORY (INHALATION) at 23:57

## 2021-12-10 RX ADMIN — IPRATROPIUM BROMIDE AND ALBUTEROL SULFATE 3 ML: 2.5; .5 SOLUTION RESPIRATORY (INHALATION) at 06:23

## 2021-12-10 RX ADMIN — BUMETANIDE 2 MG: 1 TABLET ORAL at 17:19

## 2021-12-10 RX ADMIN — BUMETANIDE 1 MG/HR: 0.25 INJECTION, SOLUTION INTRAMUSCULAR; INTRAVENOUS at 07:09

## 2021-12-10 RX ADMIN — ASPIRIN 81 MG: 81 TABLET, COATED ORAL at 08:47

## 2021-12-10 RX ADMIN — DONEPEZIL HYDROCHLORIDE 5 MG: 5 TABLET, FILM COATED ORAL at 08:55

## 2021-12-10 RX ADMIN — BUSPIRONE HYDROCHLORIDE 10 MG: 10 TABLET ORAL at 16:40

## 2021-12-10 RX ADMIN — ENOXAPARIN SODIUM 40 MG: 40 INJECTION SUBCUTANEOUS at 08:55

## 2021-12-10 RX ADMIN — Medication: at 16:18

## 2021-12-10 RX ADMIN — GABAPENTIN 600 MG: 300 CAPSULE ORAL at 05:25

## 2021-12-10 RX ADMIN — SODIUM CHLORIDE, PRESERVATIVE FREE 10 ML: 5 INJECTION INTRAVENOUS at 08:53

## 2021-12-10 RX ADMIN — GABAPENTIN 600 MG: 300 CAPSULE ORAL at 21:53

## 2021-12-10 RX ADMIN — BUMETANIDE 1 MG: 1 TABLET ORAL at 09:43

## 2021-12-10 RX ADMIN — ROSUVASTATIN CALCIUM 40 MG: 20 TABLET, FILM COATED ORAL at 21:53

## 2021-12-10 RX ADMIN — INSULIN LISPRO 6 UNITS: 100 INJECTION, SOLUTION INTRAVENOUS; SUBCUTANEOUS at 21:52

## 2021-12-10 RX ADMIN — TRAZODONE HYDROCHLORIDE 100 MG: 100 TABLET ORAL at 21:53

## 2021-12-10 RX ADMIN — CARVEDILOL 6.25 MG: 6.25 TABLET, FILM COATED ORAL at 22:17

## 2021-12-10 RX ADMIN — INSULIN LISPRO 7 UNITS: 100 INJECTION, SOLUTION INTRAVENOUS; SUBCUTANEOUS at 17:19

## 2021-12-10 RX ADMIN — IPRATROPIUM BROMIDE AND ALBUTEROL SULFATE 3 ML: 2.5; .5 SOLUTION RESPIRATORY (INHALATION) at 14:18

## 2021-12-10 RX ADMIN — INSULIN LISPRO 2 UNITS: 100 INJECTION, SOLUTION INTRAVENOUS; SUBCUTANEOUS at 08:46

## 2021-12-10 RX ADMIN — GABAPENTIN 600 MG: 300 CAPSULE ORAL at 14:35

## 2021-12-10 RX ADMIN — ENOXAPARIN SODIUM 40 MG: 40 INJECTION SUBCUTANEOUS at 21:52

## 2021-12-10 RX ADMIN — HYDROCODONE BITARTRATE AND ACETAMINOPHEN 1 TABLET: 7.5; 325 TABLET ORAL at 14:35

## 2021-12-10 RX ADMIN — IPRATROPIUM BROMIDE AND ALBUTEROL SULFATE 3 ML: 2.5; .5 SOLUTION RESPIRATORY (INHALATION) at 19:01

## 2021-12-10 NOTE — PROGRESS NOTES
[facility] Medicine Services  INPATIENT PROGRESS NOTE    Length of Stay: 2  Date of Admission: 12/8/2021  Primary Care Physician: Del Shetty MD    Subjective     Chief Complaint:     Shortness of breath, generalized edema    HPI     The patient is feeling much better today.  His output is almost 12,000 cc since admission.  De-escalation of diuretic has been initiated and the patient has been transitioned from a Bumex drip to oral Bumex 2 mg p.o. twice daily.  Creatinine elevated at 2.23 with a baseline approximately 1.6.  The patient is cheerful and talkative.  Blood sugars appear to be erratic varying from 171-339.  Sliding scale insulin is in place.  Continues on a 1500 cc fluid restriction.  If the patient's renal function remained stable through tomorrow, will consider possible discharge at that time.    Review of Systems     All pertinent negatives and positives are as above. All other systems have been reviewed and are negative unless otherwise stated.     Objective    Temp:  [97.4 °F (36.3 °C)-98.7 °F (37.1 °C)] 97.4 °F (36.3 °C)  Heart Rate:  [67-86] 67  Resp:  [16-20] 18  BP: (122-149)/(57-69) 149/63    Lab Results (last 24 hours)     Procedure Component Value Units Date/Time    POC Glucose Once [[id number]]  (Abnormal) Collected: 12/10/21 1056    Specimen: Blood Updated: 12/10/21 1112     Glucose 256 mg/dL      Comment: : [id number] Runscopee CieraMeter ID: [id number]       POC Glucose Once [[id number]]  (Abnormal) Collected: 12/09/21 1605    Specimen: Blood Updated: 12/10/21 0808     Glucose 339 mg/dL      Comment: : [id number] Heydi WalkerineMeter ID: [id number]       POC Glucose Once [[id number]]  (Abnormal) Collected: 12/10/21 0729    Specimen: Blood Updated: 12/10/21 0740     Glucose 171 mg/dL      Comment: : [id number] Trinkle CieraMeter ID: [id number]       Basic Metabolic Panel [[id number]]  (Abnormal) Collected: 12/10/21 0453    Specimen: Blood  Updated: 12/10/21 0600     Glucose 213 mg/dL      BUN 44 mg/dL      Creatinine 2.23 mg/dL      Sodium 139 mmol/L      Potassium 4.0 mmol/L      Chloride 95 mmol/L      CO2 35.0 mmol/L      Calcium 8.8 mg/dL      eGFR Non African Amer 30 mL/min/1.73      BUN/Creatinine Ratio 19.7     Anion Gap 9.0 mmol/L     Narrative:      GFR Normal >60  Chronic Kidney Disease <60  Kidney Failure <15      CBC & Differential [[id number]]  (Abnormal) Collected: 12/10/21 0453    Specimen: Blood Updated: 12/10/21 0544    Narrative:      The following orders were created for panel order CBC & Differential.  Procedure                               Abnormality         Status                     ---------                               -----------         ------                     CBC Auto Differential[[id number]]        Abnormal            Final result                 Please view results for these tests on the individual orders.    CBC Auto Differential [[id number]]  (Abnormal) Collected: 12/10/21 0453    Specimen: Blood Updated: 12/10/21 0544     WBC 6.25 10*3/mm3      RBC 3.54 10*6/mm3      Hemoglobin 9.9 g/dL      Hematocrit 31.4 %      MCV 88.7 fL      MCH 28.0 pg      MCHC 31.5 g/dL      RDW 13.6 %      RDW-SD 44.4 fl      MPV 11.5 fL      Platelets 227 10*3/mm3      Neutrophil % 54.2 %      Lymphocyte % 25.9 %      Monocyte % 12.3 %      Eosinophil % 6.7 %      Basophil % 0.6 %      Immature Grans % 0.3 %      Neutrophils, Absolute 3.38 10*3/mm3      Lymphocytes, Absolute 1.62 10*3/mm3      Monocytes, Absolute 0.77 10*3/mm3      Eosinophils, Absolute 0.42 10*3/mm3      Basophils, Absolute 0.04 10*3/mm3      Immature Grans, Absolute 0.02 10*3/mm3      nRBC 0.0 /100 WBC     POC Glucose Once [[id number]]  (Abnormal) Collected: 12/09/21 2047    Specimen: Blood Updated: 12/09/21 2109     Glucose 351 mg/dL      Comment: : [id number] Blakemore JulitoiniqueMeter ID: [id number]             Imaging Results (Last 24 Hours)     ** No results found  for the last 24 hours. **             Intake/Output Summary (Last 24 hours) at 12/10/2021 1618  Last data filed at 12/10/2021 1433  Gross per 24 hour   Intake 1200 ml   Output 7025 ml   Net -5825 ml       Physical Exam  Constitutional:       General: He is not in acute distress.     Appearance: He is morbidly obese. He is ill-appearing. He is not toxic-appearing.      Comments: Sitting in a chair at bedside. No acute distress.    On room air with good saturations.   HENT:      Head: Normocephalic and atraumatic.      Mouth: Mucous membranes are moist.      Pharynx: Oropharynx is clear.   Eyes:      Conjunctiva/sclera: Conjunctivae normal.   Cardiovascular:      Rate and Rhythm: Normal rate and regular rhythm.      Pulses: Normal pulses.      Heart sounds: No murmur heard.  Pulmonary:      Effort: Pulmonary effort is normal. No respiratory distress.      Breath sounds: Normal breath sounds. No wheezing.   Abdominal:      General: Bowel sounds are normal.  Abdomen is protuberant.     Palpations: Abdomen is soft.      Tenderness: There is no abdominal tenderness.   Musculoskeletal:         General: No tenderness. Normal range of motion.      Cervical back: Normal range of motion and neck supple. No muscular tenderness.      Right lower leg: Edema  resolved.      Left lower leg: Edema  resolved.   Skin:     General: Skin is warm and dry.      Findings: No erythema or rash.   Neurological:      General: No focal deficit present.      Mental Status: He is alert and oriented to person, place, and time.   Psychiatric:         Mood and Affect: Mood normal.         Behavior: Behavior normal.     Results Review:  I have reviewed the labs, radiology results, and diagnostic studies since my last progress note and made treatment changes reflective of the results.   I have reviewed the current medications.    Assessment/Plan     Active Hospital Problems    Diagnosis    • **Anasarca    • Diabetic polyneuropathy associated with type  2 diabetes mellitus ([facility])    • Medically noncompliant    • Diabetic foot ulcer ([facility])    • CKD (chronic kidney disease) stage 4, GFR 15-29 ml/min ([facility])    • Sleep apnea with use of continuous positive airway pressure (CPAP)    • Class 3 severe obesity due to excess calories with body mass index (BMI) of 45.0 to 49.9 in adult ([facility])    • Anemia due to chronic kidney disease    • Essential hypertension    • Type 2 diabetes mellitus with hyperglycemia, with long-term current use of insulin ([facility])        PLAN:  Discontinue Bumex drip in favor of Bumex 2 mg p.o. every 12 hours  Encouraged ambulation  Discussed wound care with SUSAN Ramos.  Continue sliding scale insulin  BMP and CBC in a.m.    Electronically signed by Payam Keyes DO, 12/10/21, 16:18 CST.

## 2021-12-10 NOTE — PLAN OF CARE
Goal Outcome Evaluation:              Outcome Summary: Pt is alert and oriented, has been up in the chair all day, dressing on foot changed per orders at 1600, received orders to continue pt norco 7.5, pt has no c/o at this time. will continue to monitor for patient safety.

## 2021-12-10 NOTE — PLAN OF CARE
Goal Outcome Evaluation:      Pt complained of neck pain. Norco one time dose given. Bumex drip continues, patient has good urine output. Fluid restriction continues. Blood glucose at bedtime elevated covered with sliding scale and levemir. Wound care completed.     Tele: [id number] [phone]

## 2021-12-10 NOTE — NURSING NOTE
Patient is current with [facility] services and services will continue upon hospital discharge if appropriate.

## 2021-12-10 NOTE — NURSING NOTE
Pt had c/o of pain, states that he normally takes pain pills 2 times a day. Pt had a one time order for a pain pill last night.  Pt was given tylenol and muscle relaxer.

## 2021-12-10 NOTE — PLAN OF CARE
Goal Outcome Evaluation: Outcome Summary: NTN assessment: %. Starting 1 Beneprotein packet mixed with 1 Boost Pudding with lunch and dinner. This provides 20 g pro/serving, additional 40 g pro/day with current PO intake. Observe fluid restriction per MD. Continue to follow.

## 2021-12-10 NOTE — CONSULTS
[facility]  INPATIENT WOUND CONSULTATION    Today's Date: 12/10/21    Patient Name: Erick Luong  MRN: [id number]  CSN: [id number]  PCP: Del Shetty MD  Referring Provider: Payam Keyes DO   Attending Provider: Payam Keyes DO  Length of Stay: 2    SUBJECTIVE   Chief Complaint: Wound of left foot    HPI: Erick Luong, a 66 y.o.male, presents with a past medical history of diabetes mellitus, chronic kidney disease, coronary artery disease, myocardial infarction, and osteomyelitis.  Patient presented to the emergency department on 12/8 due to leg edema and fluid retention.  Patient was short of breath and pulse ox was reading 80% at home.  Patient was admitted with diagnosis of anasarca.    Inpatient wound care is consulted due to wound of the left foot.  Patient had left fifth toe amputated by Dr. Gomes of [place] sometime towards the end of October of this year.  Patient states he has been seeing Dr. Gomes weekly with last visit being on Monday.  He states she also does debridements in the office.  He has been dressing wound with Dakin's wet to dry dressings.  Patient states the blood glucose has been managed at home and readings are never over 200.      Visit Dx:    ICD-10-CM ICD-9-CM   1. Kyrasarca  R60.1 782.3     Patient Active Problem List   Diagnosis   • Essential hypertension   • Type 2 diabetes mellitus with hyperglycemia, with long-term current use of insulin ([facility])   • Anemia due to chronic kidney disease   • Class 3 severe obesity due to excess calories with body mass index (BMI) of 45.0 to 49.9 in adult ([facility])   • Anasarca   • Sleep apnea with use of continuous positive airway pressure (CPAP)   • Acute kidney injury superimposed on chronic kidney disease stage IV (CMS/[facility])   • CKD (chronic kidney disease) stage 4, GFR 15-29 ml/min ([facility])   • Medically noncompliant   • Diabetic foot ulcer ([facility])   • Hyperosmolarity due to secondary diabetes mellitus ([facility])   •  Diabetic polyneuropathy associated with type 2 diabetes mellitus (HCC)   • Chest pain   • Hx of CABG   • CAD (coronary artery disease)   • Chronic constipation with acute exaccerbation   • Degeneration of cervical intervertebral disc   • Cervical radiculopathy   • Abnormality of gait   • Acute kidney injury superimposed on chronic kidney disease IIIb(HCC)       History:   Past Medical History:   Diagnosis Date   • Arthritis    • Autonomic disease    • CAD (coronary artery disease) 2/6/2017   • Coronary artery disease    • Diabetes mellitus (HCC)    • Gastroesophageal reflux disease 5/13/2019   • HTN (hypertension), benign 5/3/2017   • Hyperlipidemia    • Hypertension    • Mixed hyperlipidemia 2/7/2017   • Multiple lung nodules 1/26/2020    5mm, 9 mm RLL identified 1/2020, not present 10/2019.   • Myocardial infarction (HCC)    • Osteomyelitis ([facility]) 1/22/2020   • Osteomyelitis of fifth toe of right foot ([facility]) 10/7/2019   • Pancreatitis    • Persistent insomnia 1/20/2020   • Renal disorder    • Sleep apnea 2/6/2017   • Sleep apnea with use of continuous positive airway pressure (CPAP)    • Slow transit constipation 1/16/2019   • Spinal stenosis in cervical region 9/16/2021   • Vitamin D deficiency 3/2/2021     Past Surgical History:   Procedure Laterality Date   • ABDOMINAL SURGERY     • APPENDECTOMY     • BACK SURGERY     • CARDIAC CATHETERIZATION Left 2/8/2021    Procedure: Left Heart Cath w poss intervention left anatomical snuff box acess;  Surgeon: Omkar Charles DO;  Location:  [facility] CATH INVASIVE LOCATION;  Service: Cardiology;  Laterality: Left;   • CARDIAC SURGERY     • CATARACT EXTRACTION     • CERVICAL SPINE SURGERY     • COLONOSCOPY N/A 1/31/2017    Normal exam repeat in 5 years   • COLONOSCOPY N/A 2/11/2019    Mild acute inflammation   • COLONOSCOPY W/ POLYPECTOMY  03/04/2014    Hyperplastic polyp   • CORONARY ARTERY BYPASS GRAFT  10/2015   • ENDOSCOPY  04/13/2011    Gastritis with hemorrhage  "  • ENDOSCOPY N/A 2017    Normal exam   • ENDOSCOPY N/A 2019    Gastritis   • ENDOSCOPY N/A 2020    Non-erosive gastritis with hemorrhage   • ENDOSCOPY N/A 2/10/2021    Esophagitis   • INCISION AND DRAINAGE OF WOUND Left 2007    spider bite     Social History     Socioeconomic History   • Marital status:    Tobacco Use   • Smoking status: Former Smoker     Quit date:      Years since quittin.9   • Smokeless tobacco: Never Used   • Tobacco comment: smoked in highschool   Substance and Sexual Activity   • Alcohol use: No   • Drug use: No   • Sexual activity: Defer     Family History   Problem Relation Age of Onset   • Colon cancer Father    • Heart disease Father    • Colon cancer Sister    • Colon polyps Sister    • Alzheimer's disease Mother    • Coronary artery disease Sister    • Coronary artery disease Sister        Allergies:  Allergies   Allergen Reactions   • Cefepime Hives and Anaphylaxis   • Bactrim [Sulfamethoxazole-Trimethoprim] Other (See Comments)     \"RENAL FAILURE\"   • Clindamycin Itching   • Vancomycin Itching   • Metronidazole Rash       Medications:    Current Facility-Administered Medications:   •  acetaminophen (TYLENOL) tablet 650 mg, 650 mg, Oral, Q4H PRN, Garrett Alicia MD, 650 mg at 12/10/21 0850  •  aspirin EC tablet 81 mg, 81 mg, Oral, Daily, Garrett Alicia MD, 81 mg at 12/10/21 0847  •  bumetanide (BUMEX) tablet 2 mg, 2 mg, Oral, BID, Stewart Alvarez, APRN  •  busPIRone (BUSPAR) tablet 10 mg, 10 mg, Oral, TID, Garrett Alicia MD, 10 mg at 12/10/21 0847  •  carvedilol (COREG) tablet 6.25 mg, 6.25 mg, Oral, BID, Garrett Alicia MD, 6.25 mg at 12/10/21 0847  •  cyclobenzaprine (FLEXERIL) tablet 5 mg, 5 mg, Oral, BID PRN, Garrett Alicia MD, 5 mg at 12/10/21 0850  •  dextrose (D50W) (25 g/50 mL) IV injection 25 g, 25 g, Intravenous, Q15 Min PRN, Garrett Alicia MD  •  dextrose (GLUTOSE) oral gel 15 g, 15 " g, Oral, Q15 Min PRN, Garrett Alicia MD  •  donepezil (ARICEPT) tablet 5 mg, 5 mg, Oral, Daily, Garrett Alicia MD, 5 mg at 12/10/21 0855  •  DULoxetine (CYMBALTA) DR capsule 60 mg, 60 mg, Oral, Daily, Garrett Alicia MD, 60 mg at 12/10/21 0847  •  enoxaparin (LOVENOX) syringe 40 mg, 40 mg, Subcutaneous, Q12H, Garrett Alicia MD, 40 mg at 12/10/21 0855  •  gabapentin (NEURONTIN) capsule 600 mg, 600 mg, Oral, TID, Garrett Alicia MD, 600 mg at 12/10/21 0525  •  glucagon (human recombinant) (GLUCAGEN DIAGNOSTIC) injection 1 mg, 1 mg, Subcutaneous, Q15 Min PRN, Garrett Alicia MD  •  influenza vac split quad (FLUZONE,FLUARIX,AFLURIA,FLULAVAL) injection 0.5 mL, 0.5 mL, Intramuscular, During Hospitalization, Garrett Alicia MD  •  insulin detemir (LEVEMIR) injection 50 Units, 50 Units, Subcutaneous, BID, Garrett Alicia MD, 50 Units at 12/10/21 0943  •  insulin lispro (humaLOG) injection 0-9 Units, 0-9 Units, Subcutaneous, 4x Daily With Meals & Nightly, Garrett Alicia MD, 2 Units at 12/10/21 0846  •  ipratropium-albuterol (DUO-NEB) nebulizer solution 3 mL, 3 mL, Nebulization, Q6H, Garrett Alicia MD, 3 mL at 12/10/21 0623  •  pantoprazole (PROTONIX) EC tablet 40 mg, 40 mg, Oral, Q AM, Garrett Alicia MD, 40 mg at 12/10/21 0525  •  rosuvastatin (CRESTOR) tablet 40 mg, 40 mg, Oral, Nightly, Garrett Alicia MD, 40 mg at 12/09/21 2108  •  sodium chloride 0.9 % flush 10 mL, 10 mL, Intravenous, PRN, Garrett Alicia MD  •  [COMPLETED] Insert peripheral IV, , , Once **AND** sodium chloride 0.9 % flush 10 mL, 10 mL, Intravenous, PRN, Garrett Alicia MD  •  sodium chloride 0.9 % flush 10 mL, 10 mL, Intravenous, Q12H, Garrett Alicia MD, 10 mL at 12/10/21 0853  •  sodium chloride 0.9 % flush 10 mL, 10 mL, Intravenous, PRN, Garrett Alicia MD  •  sodium hypochlorite (DAKIN'S 1/4 STRENGTH)  0.125 % topical solution 0.125% solution, , Topical, Q12H, Dunia Wolfe, SUSAN  •  tamsulosin (FLOMAX) 24 hr capsule 0.4 mg, 0.4 mg, Oral, Daily, Garrett Alicia MD, 0.4 mg at 12/10/21 0828  •  traZODone (DESYREL) tablet 100 mg, 100 mg, Oral, Nightly, Garrett Alicia MD, 100 mg at 12/09/21 8486    Review of Systems:  Review of Systems   Constitutional: Negative for chills and fever.   HENT: Negative for rhinorrhea and sore throat.    Respiratory: Negative for cough and shortness of breath.    Cardiovascular: Negative for chest pain and palpitations.   Gastrointestinal: Negative for diarrhea, nausea and vomiting.   Genitourinary: Positive for frequency. Negative for urgency.   Musculoskeletal: Negative for arthralgias and myalgias.   Skin: Positive for wound. Negative for color change.   Neurological: Negative for dizziness and headaches.   Psychiatric/Behavioral: Negative for agitation, behavioral problems and confusion.         OBJECTIVE     Vitals:    12/10/21 1115   BP: 144/62   Pulse: 74   Resp: 18   Temp: 98.6 °F (37 °C)   SpO2: 94%       PHYSICAL EXAM    Physical Exam  Vitals and nursing note reviewed.   Constitutional:       General: He is awake.      Appearance: He is morbidly obese.      Comments: Body mass index is 44.91 kg/m².    HENT:      Head: Normocephalic and atraumatic.   Eyes:      General: Lids are normal. Gaze aligned appropriately.   Cardiovascular:      Rate and Rhythm: Normal rate and regular rhythm.   Pulmonary:      Effort: Pulmonary effort is normal. No respiratory distress.   Abdominal:      General: Abdomen is protuberant. There is no distension.   Musculoskeletal:      Cervical back: Normal range of motion and neck supple.   Feet:      Comments: Wound present on left lateral foot at site of fifth toe amputation.  Edges are open and slightly macerated wound base is pink and moist.  There is some slough and subcutaneous tissue present in the wound bed.  Wound measures  2.4 cm x 2.1 cm x 0.6 cm with tunnel reaching 2.6 cm.  There is a moderate amount of yellow creamy drainage on dressing.  Skin:     General: Skin is warm and dry.      Findings: Wound present.   Neurological:      Mental Status: He is alert and oriented to person, place, and time.   Psychiatric:         Attention and Perception: Attention normal.         Mood and Affect: Mood normal.         Speech: Speech normal.         Behavior: Behavior is cooperative.         Picture taken by nursing staff on admission           Results Review:  Lab Results (last 48 hours)     Procedure Component Value Units Date/Time    POC Glucose Once [[id number]]  (Abnormal) Collected: 12/10/21 1056    Specimen: Blood Updated: 12/10/21 1112     Glucose 256 mg/dL      Comment: : [id number] Web[employer] CieraMeter ID: [id number]       POC Glucose Once [[id number]]  (Abnormal) Collected: 12/09/21 1605    Specimen: Blood Updated: 12/10/21 0808     Glucose 339 mg/dL      Comment: : [id number] Heydi CarolineMeter ID: [id number]       POC Glucose Once [[id number]]  (Abnormal) Collected: 12/10/21 0729    Specimen: Blood Updated: 12/10/21 0740     Glucose 171 mg/dL      Comment: : [id number] [employer] CieraMeter ID: [id number]       Basic Metabolic Panel [[id number]]  (Abnormal) Collected: 12/10/21 0453    Specimen: Blood Updated: 12/10/21 0600     Glucose 213 mg/dL      BUN 44 mg/dL      Creatinine 2.23 mg/dL      Sodium 139 mmol/L      Potassium 4.0 mmol/L      Chloride 95 mmol/L      CO2 35.0 mmol/L      Calcium 8.8 mg/dL      eGFR Non African Amer 30 mL/min/1.73      BUN/Creatinine Ratio 19.7     Anion Gap 9.0 mmol/L     Narrative:      GFR Normal >60  Chronic Kidney Disease <60  Kidney Failure <15      CBC & Differential [[id number]]  (Abnormal) Collected: 12/10/21 0453    Specimen: Blood Updated: 12/10/21 0544    Narrative:      The following orders were created for panel order CBC & Differential.  Procedure                                Abnormality         Status                     ---------                               -----------         ------                     CBC Auto Differential[[id number]]        Abnormal            Final result                 Please view results for these tests on the individual orders.    CBC Auto Differential [[id number]]  (Abnormal) Collected: 12/10/21 0453    Specimen: Blood Updated: 12/10/21 0544     WBC 6.25 10*3/mm3      RBC 3.54 10*6/mm3      Hemoglobin 9.9 g/dL      Hematocrit 31.4 %      MCV 88.7 fL      MCH 28.0 pg      MCHC 31.5 g/dL      RDW 13.6 %      RDW-SD 44.4 fl      MPV 11.5 fL      Platelets 227 10*3/mm3      Neutrophil % 54.2 %      Lymphocyte % 25.9 %      Monocyte % 12.3 %      Eosinophil % 6.7 %      Basophil % 0.6 %      Immature Grans % 0.3 %      Neutrophils, Absolute 3.38 10*3/mm3      Lymphocytes, Absolute 1.62 10*3/mm3      Monocytes, Absolute 0.77 10*3/mm3      Eosinophils, Absolute 0.42 10*3/mm3      Basophils, Absolute 0.04 10*3/mm3      Immature Grans, Absolute 0.02 10*3/mm3      nRBC 0.0 /100 WBC     POC Glucose Once [[id number]]  (Abnormal) Collected: 12/09/21 2047    Specimen: Blood Updated: 12/09/21 2109     Glucose 351 mg/dL      Comment: : [id number] Blakemore DominiqueMeter ID: [id number]       POC Glucose Once [[id number]]  (Abnormal) Collected: 12/09/21 1057    Specimen: Blood Updated: 12/09/21 1128     Glucose 206 mg/dL      Comment: : [id number] Justo WattonnaMeter ID: [id number]       POC Glucose Once [[id number]]  (Normal) Collected: 12/09/21 0726    Specimen: Blood Updated: 12/09/21 0806     Glucose 130 mg/dL      Comment: : [id number] Justo WattonnaMeter ID: [id number]       Basic Metabolic Panel [[id number]]  (Abnormal) Collected: 12/09/21 0519    Specimen: Blood Updated: 12/09/21 0613     Glucose 127 mg/dL      BUN 37 mg/dL      Creatinine 2.01 mg/dL      Sodium 138 mmol/L      Potassium 3.8 mmol/L      Chloride 99 mmol/L      CO2 29.0 mmol/L      Calcium 8.6  mg/dL      eGFR Non African Amer 34 mL/min/1.73      BUN/Creatinine Ratio 18.4     Anion Gap 10.0 mmol/L     Narrative:      GFR Normal >60  Chronic Kidney Disease <60  Kidney Failure <15      CBC & Differential [[id number]]  (Abnormal) Collected: 12/09/21 0519    Specimen: Blood Updated: 12/09/21 0544    Narrative:      The following orders were created for panel order CBC & Differential.  Procedure                               Abnormality         Status                     ---------                               -----------         ------                     CBC Auto Differential[[id number]]        Abnormal            Final result                 Please view results for these tests on the individual orders.    CBC Auto Differential [[id number]]  (Abnormal) Collected: 12/09/21 0519    Specimen: Blood Updated: 12/09/21 0544     WBC 7.32 10*3/mm3      RBC 3.35 10*6/mm3      Hemoglobin 9.1 g/dL      Hematocrit 30.0 %      MCV 89.6 fL      MCH 27.2 pg      MCHC 30.3 g/dL      RDW 13.9 %      RDW-SD 45.1 fl      MPV 11.2 fL      Platelets 207 10*3/mm3      Neutrophil % 71.1 %      Lymphocyte % 14.9 %      Monocyte % 10.0 %      Eosinophil % 3.0 %      Basophil % 0.3 %      Immature Grans % 0.7 %      Neutrophils, Absolute 5.21 10*3/mm3      Lymphocytes, Absolute 1.09 10*3/mm3      Monocytes, Absolute 0.73 10*3/mm3      Eosinophils, Absolute 0.22 10*3/mm3      Basophils, Absolute 0.02 10*3/mm3      Immature Grans, Absolute 0.05 10*3/mm3      nRBC 0.0 /100 WBC     POC Glucose Once [[id number]]  (Normal) Collected: 12/09/21 0035    Specimen: Blood Updated: 12/09/21 0047     Glucose 78 mg/dL      Comment: : [id number] Deidra IzquierdoMet ID: [id number]       [place] Draw [[id number]] Collected: 12/08/21 2041    Specimen: Blood Updated: 12/09/21 0045    Narrative:      The following orders were created for panel order [place] Draw.  Procedure                               Abnormality         Status                     ---------                                -----------         ------                     Green Top (Gel)[[id number]]                                  Final result               Lavender Top[[id number]]                                     Final result               Red Top[[id number]]                                          Final result               Gray Top[[id number]]                                         Final result               Light Blue Top[[id number]]                                   Final result                 Please view results for these tests on the individual orders.    North Top [[id number]] Collected: 12/08/21 2041    Specimen: Blood Updated: 12/09/21 0045     Extra Tube Hold for add-ons.     Comment: Auto resulted.       COVID PRE-OP / PRE-PROCEDURE SCREENING ORDER (NO ISOLATION) - Swab, Nasal Cavity [[id number]]  (Normal) Collected: 12/08/21 2317    Specimen: Swab from Nasal Cavity Updated: 12/09/21 0009    Narrative:      The following orders were created for panel order COVID PRE-OP / PRE-PROCEDURE SCREENING ORDER (NO ISOLATION) - Swab, Nasal Cavity.  Procedure                               Abnormality         Status                     ---------                               -----------         ------                     COVID-19,[facility]...[[id number]]  Normal              Final result                 Please view results for these tests on the individual orders.    COVID-19,[facility] IN-HOUSE,Nasal Swab No Transport Media 3-4 HR TAT - Swab, Nasal Cavity [[id number]]  (Normal) Collected: 12/08/21 2317    Specimen: Swab from Nasal Cavity Updated: 12/09/21 0009     COVID19 Not Detected    Narrative:      Fact sheet for providers: https://www.fda.gov/media/757123/download     Fact sheet for patients: https://www.fda.gov/media/887821/download    Test performed by PCR.    Consider negative results in combination with clinical observations, patient history, and epidemiological information.    Green Top (Gel)  [[id number]] Collected: 12/08/21 2041    Specimen: Blood Updated: 12/08/21 2145     Extra Tube Hold for add-ons.     Comment: Auto resulted.       Lavender Top [[id number]] Collected: 12/08/21 2041    Specimen: Blood Updated: 12/08/21 2145     Extra Tube hold for add-on     Comment: Auto resulted       Red Top [[id number]] Collected: 12/08/21 2041    Specimen: Blood Updated: 12/08/21 2145     Extra Tube Hold for add-ons.     Comment: Auto resulted.       Light Blue Top [[id number]] Collected: 12/08/21 2041    Specimen: Blood Updated: 12/08/21 2145     Extra Tube hold for add-on     Comment: Auto resulted       Urinalysis With Microscopic If Indicated (No Culture) - Urine, Clean Catch [[id number]]  (Abnormal) Collected: 12/08/21 2112    Specimen: Urine, Clean Catch Updated: 12/08/21 2139     Color, UA Yellow     Appearance, UA Clear     pH, UA 6.5     Specific Gravity, UA 1.019     Glucose, UA >=1000 mg/dL (3+)     Ketones, UA Negative     Bilirubin, UA Negative     Blood, UA Negative     Protein, UA 30 mg/dL (1+)     Leuk Esterase, UA Negative     Nitrite, UA Negative     Urobilinogen, UA 1.0 E.U./dL    Urinalysis, Microscopic Only - Urine, Clean Catch [[id number]] Collected: 12/08/21 2112    Specimen: Urine, Clean Catch Updated: 12/08/21 2139     RBC, UA None Seen /HPF      WBC, UA None Seen /HPF      Bacteria, UA None Seen /HPF      Squamous Epithelial Cells, UA 0-2 /HPF      Methodology Manual Light Microscopy    Comprehensive Metabolic Panel [[id number]]  (Abnormal) Collected: 12/08/21 2041    Specimen: Blood Updated: 12/08/21 2118     Glucose 276 mg/dL      BUN 36 mg/dL      Creatinine 2.09 mg/dL      Sodium 137 mmol/L      Potassium 4.1 mmol/L      Chloride 98 mmol/L      CO2 27.0 mmol/L      Calcium 8.6 mg/dL      Total Protein 6.7 g/dL      Albumin 3.70 g/dL      ALT (SGPT) 11 U/L      AST (SGOT) 15 U/L      Alkaline Phosphatase 109 U/L      Total Bilirubin 0.4 mg/dL      eGFR Non African Amer 32 mL/min/1.73       Globulin 3.0 gm/dL      A/G Ratio 1.2 g/dL      BUN/Creatinine Ratio 17.2     Anion Gap 12.0 mmol/L     Narrative:      GFR Normal >60  Chronic Kidney Disease <60  Kidney Failure <15      Troponin [[id number]]  (Normal) Collected: 12/08/21 2041    Specimen: Blood Updated: 12/08/21 2114     Troponin T <0.010 ng/mL     Narrative:      Troponin T Reference Range:  <= 0.03 ng/mL-   Negative for AMI  >0.03 ng/mL-     Abnormal for myocardial necrosis.  Clinicians would have to utilize clinical acumen, EKG, Troponin and serial changes to determine if it is an Acute Myocardial Infarction or myocardial injury due to an underlying chronic condition.       Results may be falsely decreased if patient taking Biotin.      BNP [[id number]]  (Abnormal) Collected: 12/08/21 2041    Specimen: Blood Updated: 12/08/21 2114     proBNP 1,308.0 pg/mL     Narrative:      Among patients with dyspnea, NT-proBNP is highly sensitive for the detection of acute congestive heart failure. In addition NT-proBNP of <300 pg/ml effectively rules out acute congestive heart failure with 99% negative predictive value.    Results may be falsely decreased if patient taking Biotin.      Magnesium [[id number]]  (Normal) Collected: 12/08/21 2041    Specimen: Blood Updated: 12/08/21 2112     Magnesium 2.2 mg/dL     CBC & Differential [[id number]]  (Abnormal) Collected: 12/08/21 2041    Specimen: Blood Updated: 12/08/21 2056    Narrative:      The following orders were created for panel order CBC & Differential.  Procedure                               Abnormality         Status                     ---------                               -----------         ------                     CBC Auto Differential[[id number]]        Abnormal            Final result                 Please view results for these tests on the individual orders.    CBC Auto Differential [[id number]]  (Abnormal) Collected: 12/08/21 2041    Specimen: Blood Updated: 12/08/21 2056     WBC 6.99  10*3/mm3      RBC 3.32 10*6/mm3      Hemoglobin 9.1 g/dL      Hematocrit 29.7 %      MCV 89.5 fL      MCH 27.4 pg      MCHC 30.6 g/dL      RDW 13.8 %      RDW-SD 45.0 fl      MPV 10.6 fL      Platelets 206 10*3/mm3      Neutrophil % 65.9 %      Lymphocyte % 17.9 %      Monocyte % 10.3 %      Eosinophil % 4.9 %      Basophil % 0.4 %      Immature Grans % 0.6 %      Neutrophils, Absolute 4.61 10*3/mm3      Lymphocytes, Absolute 1.25 10*3/mm3      Monocytes, Absolute 0.72 10*3/mm3      Eosinophils, Absolute 0.34 10*3/mm3      Basophils, Absolute 0.03 10*3/mm3      Immature Grans, Absolute 0.04 10*3/mm3      nRBC 0.0 /100 WBC         Imaging Results (Last 72 Hours)     Procedure Component Value Units Date/Time    XR Chest 1 View [[id number]] Collected: 12/08/21 2056     Updated: 12/08/21 2101    Narrative:      EXAMINATION: XR CHEST 1 VW- 12/8/2021 8:56 PM CST     HISTORY: SOB.     REPORT: Frontal view the chest was obtained.     COMPARISON: Chest x-ray 11/7/2021.     The lungs are grossly hypoaerated, there are central basilar vascular  crowding, with borderline cardiomegaly. Scattered calcified granulomas  are present. There is no lung consolidation. Previous CABG is evident.  No pneumothorax or pleural effusion is identified. There is previous  ACDF.       Impression:      Gross hypoaeration of the lungs with central and basilar  vascular crowding, no overt evidence of CHF or pneumonia.  This report was finalized on 12/08/2021 20:58 by Dr. Percy Cesar MD.             ASSESSMENT/PLAN       Examination and evaluation of wound(s) was performed.    DIAGNOSIS:   Surgical wound-amputation of left fifth toe  Type 2 diabetes mellitus  Obesity  Acute kidney injury superimposed on chronic kidney disease      PLAN:     Start     Ordered    12/10/21 1300  Wound Care  Every 12 Hours        Question Answer Comment   Wound Locations Left 5th toe amputation site    Wound Care Instructions Clean with NS.  Apply Dakins wet to dry  dressing filling all open space.  Wrap with Kerlix.  Change every 12 hours.    Cleanse Normal Saline    Cleanse Dakins Solution 1/4 Strength    Dressing: Gauze Roll        12/10/21 1203               Discussed findings and treatment plan including risks, benefits, and treatment options with patient in detail. Patient agreed with treatment plan.      This document has been electronically signed by SUSAN Saab on 12/10/2021 12:04 CST

## 2021-12-10 NOTE — PROGRESS NOTES
Nephrology ([facility]) Progress Note      Patient:  Erick Luong  YOB: 1955  Date of Service: 12/10/2021  MRN: [id number]   Acct: [id number]   Primary Care Physician: Dle Shetty MD  Advance Directive:   Code Status and Medical Interventions:   Ordered at: 12/08/21 2241     Code Status (Patient has no pulse and is not breathing):    CPR (Attempt to Resuscitate)     Medical Interventions (Patient has pulse or is breathing):    Full Support     Admit Date: 12/8/2021       Hospital Day: 2  Referring Provider: Garrett Alicia,*      Patient personally seen and examined.  Complete chart including Consults, Notes, Operative Reports, Labs, Cardiology, and Radiology studies reviewed as able.        Subjective:  Erick Luong is a 66 y.o. male for whom we were consulted for evaluation and treatment of acute kidney injury with anasarca. Patient well known to our office due to multiple hospitalizations for similar issues. Baseline CKD stage 3b, baseline creatinine 1.6.  Patient was seen in our office earlier this week by SUSAN Santo. Patient noted to be significantly volume overloaded at that time, weight had increased well over 20 pounds. Creatinine was 2.0.  Patient had been taking Bumex 1 mg BID and Metolazone was added.  Presented to [facility] ER on 12/08 with increased dyspnea, edema/anasarca.  Initial creatinine in ER was 2.09. placed on Bumex drip. Has had good response to Bumex infusion, dyspnea and edema are improving.    Today is feeling better, no new overnight issues. Urine output nonoliguric.    Allergies:  Cefepime, Bactrim [sulfamethoxazole-trimethoprim], Clindamycin, Vancomycin, and Metronidazole    Home Meds:  Medications Prior to Admission   Medication Sig Dispense Refill Last Dose   • bumetanide (BUMEX) 1 MG tablet Take 1 tablet by mouth 2 (Two) Times a Day. 60 tablet 6 12/8/2021 at Unknown time   • busPIRone (BUSPAR) 10 MG tablet Take 1 tablet  3 times a day by oral route as needed. 45 tablet 2 12/8/2021 at Unknown time   • carvedilol (COREG) 6.25 MG tablet Take 1 tablet by mouth 2 (Two) Times a Day. 180 tablet 4 12/8/2021 at Unknown time   • colchicine 0.6 MG tablet Take 1 tablet by mouth 2 (two) times a day. 28 tablet 2 Past Month at Unknown time   • cyclobenzaprine (FLEXERIL) 5 MG tablet Take 1 tablet by mouth 2 (Two) Times a Day As Needed for muscle spasms 60 tablet 5 Past Month at Unknown time   • donepezil (ARICEPT) 5 MG tablet Take 1 tablet by mouth Daily. 90 tablet 4 12/8/2021 at Unknown time   • DULoxetine (CYMBALTA) 60 MG capsule Take 60 mg by mouth Daily.   12/8/2021 at Unknown time   • Finerenone (Kerendia) 10 MG tablet Take 10 mg by mouth Daily. 30 tablet 6 12/8/2021 at Unknown time   • gabapentin (NEURONTIN) 600 MG tablet Take 1 tablet by mouth 3 (Three) Times a Day. 90 tablet 2 12/8/2021 at Unknown time   • HYDROcodone-acetaminophen (NORCO) 7.5-325 MG per tablet Take 1 tablet by mouth 2 (Two) Times a Day As Needed. 45 tablet 0 12/8/2021 at Unknown time   • Insulin Lispro, 1 Unit Dial, (HumaLOG KwikPen) 100 UNIT/ML solution pen-injector Inject according to sliding scale:  glucose=151-200 use 2 UNITS; glucose=201-250 use 4 units; glucose=251-300 use 6 units; above 301 use 8 units 18 mL 11 12/8/2021 at Unknown time   • Insulin Regular Human, Conc, (HumuLIN R U-500 KwikPen) 500 UNIT/ML solution pen-injector CONCENTRATED injection Inject 120 units under the skin into the appropriate area as directed Every Morning with Breakfast  units Every Evening with Supper. 18 mL 11 12/8/2021 at Unknown time   • ipratropium-albuterol (DUO-NEB) 0.5-2.5 mg/3 ml nebulizer Take 3 mL by nebulization Every 6 (Six) Hours. 360 mL 0 Past Month at Unknown time   • irbesartan (AVAPRO) 300 MG tablet Take 1 tablet by mouth Daily. 90 tablet 1 12/8/2021 at Unknown time   • metOLazone (ZAROXOLYN) 2.5 MG tablet Take 1 tablet by mouth Daily. 30 tablet 2 12/8/2021 at  Unknown time   • pantoprazole (PROTONIX) 40 MG EC tablet Take 1 tablet by mouth twice a day. 180 tablet 4 12/8/2021 at Unknown time   • prazosin (MINIPRESS) 2 MG capsule Take 1 capsule by mouth every night at bedtime. 30 capsule 4 12/8/2021 at Unknown time   • sodium hypochlorite (DAKIN'S) 0.25 % topical solution Apply to wound twice a day as directed 473 mL 3 12/8/2021 at Unknown time   • aspirin 81 MG EC tablet Take 81 mg by mouth Daily.   12/8/2021   • Dulaglutide (Trulicity) 1.5 MG/0.5ML solution pen-injector Inject 1.5 mg under the skin into the appropriate area as directed 1 (One) Time Per Week. (Patient taking differently: Inject 1.5 mg under the skin into the appropriate area as directed 1 (One) Time Per Week. mondays) 6 mL 11 12/6/2021   • gabapentin (NEURONTIN) 300 MG capsule Take 2 capsules by mouth every morning and 3 capsules every night at bedtime. 150 capsule 2 Unknown at Unknown time   • ondansetron ODT (ZOFRAN-ODT) 4 MG disintegrating tablet Place 1 tablet on tongue three times a day as needed. 15 tablet 1 More than a month at Unknown time   • rosuvastatin (CRESTOR) 40 MG tablet Take 1 tablet by mouth Every Night. 90 tablet 0 12/7/2021   • tamsulosin (FLOMAX) 0.4 MG capsule 24 hr capsule Take 1 capsule by mouth Daily.   12/7/2021   • traZODone (DESYREL) 100 MG tablet Take 1 tablet by mouth Every Night. (Patient taking differently: Take 150 mg by mouth Every Night.) 90 tablet 2 12/6/2021       Medicines:  Current Facility-Administered Medications   Medication Dose Route Frequency Provider Last Rate Last Admin   • acetaminophen (TYLENOL) tablet 650 mg  650 mg Oral Q4H PRN Garrett Alicia MD   650 mg at 12/10/21 0850   • aspirin EC tablet 81 mg  81 mg Oral Daily Garrett Alicia MD   81 mg at 12/10/21 0847   • bumetanide (BUMEX) tablet 1 mg  1 mg Oral BID Payam Keyes DO   1 mg at 12/10/21 0943   • busPIRone (BUSPAR) tablet 10 mg  10 mg Oral TID Garrett Alicia MD   10  mg at 12/10/21 0847   • carvedilol (COREG) tablet 6.25 mg  6.25 mg Oral BID Garrett Alicia MD   6.25 mg at 12/10/21 0847   • cyclobenzaprine (FLEXERIL) tablet 5 mg  5 mg Oral BID PRN Garrett Alicia MD   5 mg at 12/10/21 0850   • dextrose (D50W) (25 g/50 mL) IV injection 25 g  25 g Intravenous Q15 Min PRN Garrett Alicia MD       • dextrose (GLUTOSE) oral gel 15 g  15 g Oral Q15 Min PRN Garrett Alicia MD       • donepezil (ARICEPT) tablet 5 mg  5 mg Oral Daily Garrett Alicia MD   5 mg at 12/10/21 0855   • DULoxetine (CYMBALTA) DR capsule 60 mg  60 mg Oral Daily Garrett Alicia MD   60 mg at 12/10/21 0847   • enoxaparin (LOVENOX) syringe 40 mg  40 mg Subcutaneous Q12H Garrett Alicia MD   40 mg at 12/10/21 0855   • gabapentin (NEURONTIN) capsule 600 mg  600 mg Oral TID Garrett Alicia MD   600 mg at 12/10/21 0525   • glucagon (human recombinant) (GLUCAGEN DIAGNOSTIC) injection 1 mg  1 mg Subcutaneous Q15 Min PRN Garrett Alicia MD       • influenza vac split quad (FLUZONE,FLUARIX,AFLURIA,FLULAVAL) injection 0.5 mL  0.5 mL Intramuscular During Hospitalization Garrett Alicia MD       • insulin detemir (LEVEMIR) injection 50 Units  50 Units Subcutaneous BID Garrett Alicia MD   50 Units at 12/10/21 0943   • insulin lispro (humaLOG) injection 0-9 Units  0-9 Units Subcutaneous 4x Daily With Meals & Nightly Garrett Alicia MD   2 Units at 12/10/21 0846   • ipratropium-albuterol (DUO-NEB) nebulizer solution 3 mL  3 mL Nebulization Q6H Garrett Alicia MD   3 mL at 12/10/21 0623   • pantoprazole (PROTONIX) EC tablet 40 mg  40 mg Oral Q AM Garrett Alicia MD   40 mg at 12/10/21 0525   • rosuvastatin (CRESTOR) tablet 40 mg  40 mg Oral Nightly Garrett Alicia MD   40 mg at 12/09/21 2108   • sodium chloride 0.9 % flush 10 mL  10 mL Intravenous PRN Garrett Alicia MD       • sodium  chloride 0.9 % flush 10 mL  10 mL Intravenous PRN Garrett Alicia MD       • sodium chloride 0.9 % flush 10 mL  10 mL Intravenous Q12H Garrett Alicia MD   10 mL at 12/10/21 0853   • sodium chloride 0.9 % flush 10 mL  10 mL Intravenous PRN Garrett Alicia MD       • tamsulosin (FLOMAX) 24 hr capsule 0.4 mg  0.4 mg Oral Daily Garrett Alicia MD   0.4 mg at 12/10/21 0847   • traZODone (DESYREL) tablet 100 mg  100 mg Oral Nightly Garrett Alicia MD   100 mg at 12/09/21 2108       Past Medical History:  Past Medical History:   Diagnosis Date   • Arthritis    • Autonomic disease    • CAD (coronary artery disease) 2/6/2017   • Coronary artery disease    • Diabetes mellitus (HCC)    • Gastroesophageal reflux disease 5/13/2019   • HTN (hypertension), benign 5/3/2017   • Hyperlipidemia    • Hypertension    • Mixed hyperlipidemia 2/7/2017   • Multiple lung nodules 1/26/2020    5mm, 9 mm RLL identified 1/2020, not present 10/2019.   • Myocardial infarction (HCC)    • Osteomyelitis (HCC) 1/22/2020   • Osteomyelitis of fifth toe of right foot (HCC) 10/7/2019   • Pancreatitis    • Persistent insomnia 1/20/2020   • Renal disorder    • Sleep apnea 2/6/2017   • Sleep apnea with use of continuous positive airway pressure (CPAP)    • Slow transit constipation 1/16/2019   • Spinal stenosis in cervical region 9/16/2021   • Vitamin D deficiency 3/2/2021       Past Surgical History:  Past Surgical History:   Procedure Laterality Date   • ABDOMINAL SURGERY     • APPENDECTOMY     • BACK SURGERY     • CARDIAC CATHETERIZATION Left 2/8/2021    Procedure: Left Heart Cath w poss intervention left anatomical snuff box acess;  Surgeon: Omkar Charles DO;  Location: [facility] CATH INVASIVE LOCATION;  Service: Cardiology;  Laterality: Left;   • CARDIAC SURGERY     • CATARACT EXTRACTION     • CERVICAL SPINE SURGERY     • COLONOSCOPY N/A 1/31/2017    Normal exam repeat in 5 years   • COLONOSCOPY  N/A 2019    Mild acute inflammation   • COLONOSCOPY W/ POLYPECTOMY  2014    Hyperplastic polyp   • CORONARY ARTERY BYPASS GRAFT  10/2015   • ENDOSCOPY  2011    Gastritis with hemorrhage   • ENDOSCOPY N/A 2017    Normal exam   • ENDOSCOPY N/A 2019    Gastritis   • ENDOSCOPY N/A 2020    Non-erosive gastritis with hemorrhage   • ENDOSCOPY N/A 2/10/2021    Esophagitis   • INCISION AND DRAINAGE OF WOUND Left 2007    spider bite       Family History  Family History   Problem Relation Age of Onset   • Colon cancer Father    • Heart disease Father    • Colon cancer Sister    • Colon polyps Sister    • Alzheimer's disease Mother    • Coronary artery disease Sister    • Coronary artery disease Sister        Social History  Social History     Socioeconomic History   • Marital status:    Tobacco Use   • Smoking status: Former Smoker     Quit date:      Years since quittin.9   • Smokeless tobacco: Never Used   • Tobacco comment: smoked in highschool   Substance and Sexual Activity   • Alcohol use: No   • Drug use: No   • Sexual activity: Defer         Review of Systems:  History obtained from chart review and the patient  General ROS: No fever or chills  Respiratory ROS: No cough, shortness of breath, wheezing  Cardiovascular ROS: No chest pain or palpitations  Gastrointestinal ROS: No abdominal pain or melena  Genito-Urinary ROS: No dysuria or hematuria  Psych ROS: No anxiety and depression    Objective:  Patient Vitals for the past 24 hrs:   BP Temp Temp src Pulse Resp SpO2 Height Weight   12/10/21 0721 142/65 98.4 °F (36.9 °C) Oral 67 16 97 % -- --   12/10/21 0628 -- -- -- 77 -- -- -- --   12/10/21 0623 -- -- -- 76 16 93 % -- --   12/10/21 0456 122/57 98.5 °F (36.9 °C) Oral 70 16 95 % -- --   12/10/21 0045 138/61 98.4 °F (36.9 °C) Oral 79 18 98 % -- --   12/10/21 0003 -- -- -- 78 18 94 % -- --   215 -- -- -- 78 18 95 % -- --   21 2042 141/69 98.7 °F (37.1 °C)  "Oral 81 18 97 % 182.9 cm (72\") (!) 150 kg (331 lb 1.6 oz)   12/09/21 1844 -- -- -- 86 20 94 % -- --   12/09/21 1835 -- -- -- 83 20 94 % -- --   12/09/21 1459 -- -- -- 70 18 96 % -- --   12/09/21 1430 129/52 98.5 °F (36.9 °C) Oral 72 16 95 % -- --   12/09/21 1054 139/67 97.8 °F (36.6 °C) Oral 67 16 98 % -- --       Intake/Output Summary (Last 24 hours) at 12/10/2021 1019  Last data filed at 12/10/2021 0900  Gross per 24 hour   Intake 960 ml   Output 8095 ml   Net -7135 ml     General: awake/alert   Chest:  diminished  CVS: regular rate and rhythm  Abdominal: distended, obese, +BS  Extremities: 2-3+ edema to mid thigh  Skin: warm and dry without rash   Neuro: no focal motor deficits      Labs:  Results from last 7 days   Lab Units 12/10/21  0453 12/09/21  0519 12/08/21  2041   WBC 10*3/mm3 6.25 7.32 6.99   HEMOGLOBIN g/dL 9.9* 9.1* 9.1*   HEMATOCRIT % 31.4* 30.0* 29.7*   PLATELETS 10*3/mm3 227 207 206         Results from last 7 days   Lab Units 12/10/21  0453 12/09/21  0519 12/08/21  2041   SODIUM mmol/L 139 138 137   POTASSIUM mmol/L 4.0 3.8 4.1   CHLORIDE mmol/L 95* 99 98   CO2 mmol/L 35.0* 29.0 27.0   BUN mg/dL 44* 37* 36*   CREATININE mg/dL 2.23* 2.01* 2.09*   CALCIUM mg/dL 8.8 8.6 8.6   BILIRUBIN mg/dL  --   --  0.4   ALK PHOS U/L  --   --  109   ALT (SGPT) U/L  --   --  11   AST (SGOT) U/L  --   --  15   GLUCOSE mg/dL 213* 127* 276*       Radiology:   Imaging Results (Last 72 Hours)     Procedure Component Value Units Date/Time    XR Chest 1 View [[id number]] Collected: 12/08/21 2056     Updated: 12/08/21 2101    Narrative:      EXAMINATION: XR CHEST 1 VW- 12/8/2021 8:56 PM CST     HISTORY: SOB.     REPORT: Frontal view the chest was obtained.     COMPARISON: Chest x-ray 11/7/2021.     The lungs are grossly hypoaerated, there are central basilar vascular  crowding, with borderline cardiomegaly. Scattered calcified granulomas  are present. There is no lung consolidation. Previous CABG is evident.  No pneumothorax " or pleural effusion is identified. There is previous  ACDF.       Impression:      Gross hypoaeration of the lungs with central and basilar  vascular crowding, no overt evidence of CHF or pneumonia.  This report was finalized on 12/08/2021 20:58 by Dr. Percy Cesar MD.          Culture:  No results found for: BLOODCX, URINECX, WOUNDCX, MRSACX, RESPCX, STOOLCX      Assessment   1.  Acute kidney injury--worse today  2.  Baseline chronic kidney disease stage 3b  3.  Anasarca/volume overload--improving  4.  Type 2 diabetes  5.  Hypertension  6.  CAD with prior CABG  7.  Obstructive sleep apnea  8.  Obesity   9.  Wound at site of previous 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    Plan:  1.  Excellent output with Bumex drip, renal function a bit worse. Agree with de-escalation of diuretics.  2.  Monitor labs      Stewart Alvarez, APRSHERRILL  12/10/2021  10:19 CST

## 2021-12-10 NOTE — CASE MANAGEMENT/SOCIAL WORK
Continued Stay Note  [facility]     Patient Name: Erick Luong  MRN: [id number]  Today's Date: 12/10/2021    Admit Date: 12/8/2021     Discharge Plan     Row Name 12/10/21 0933       Plan    Plan Home with spouse and resume Merged with [facility]    Patient/Family in Agreement with Plan yes    Plan Comments Chart reviewed; events noted.  Pt plans to return home with spouse.  Merged with [facility] will need resumption orders upon dc.   will follow.               Discharge Codes    No documentation.                     SUZIE George

## 2021-12-11 ENCOUNTER — READMISSION MANAGEMENT (OUTPATIENT)
Dept: CALL CENTER | Facility: HOSPITAL | Age: 66
End: 2021-12-11

## 2021-12-11 VITALS
SYSTOLIC BLOOD PRESSURE: 155 MMHG | RESPIRATION RATE: 16 BRPM | BODY MASS INDEX: 42.66 KG/M2 | HEART RATE: 77 BPM | HEIGHT: 72 IN | OXYGEN SATURATION: 93 % | DIASTOLIC BLOOD PRESSURE: 69 MMHG | TEMPERATURE: 98.1 F | WEIGHT: 315 LBS

## 2021-12-11 LAB
ANION GAP SERPL CALCULATED.3IONS-SCNC: 14 MMOL/L (ref 5–15)
BASOPHILS # BLD AUTO: 0.04 10*3/MM3 (ref 0–0.2)
BASOPHILS NFR BLD AUTO: 0.5 % (ref 0–1.5)
BUN SERPL-MCNC: 44 MG/DL (ref 8–23)
BUN/CREAT SERPL: 20.3 (ref 7–25)
CALCIUM SPEC-SCNC: 9 MG/DL (ref 8.6–10.5)
CHLORIDE SERPL-SCNC: 92 MMOL/L (ref 98–107)
CO2 SERPL-SCNC: 32 MMOL/L (ref 22–29)
CREAT SERPL-MCNC: 2.17 MG/DL (ref 0.76–1.27)
DEPRECATED RDW RBC AUTO: 42.2 FL (ref 37–54)
EOSINOPHIL # BLD AUTO: 0.47 10*3/MM3 (ref 0–0.4)
EOSINOPHIL NFR BLD AUTO: 5.9 % (ref 0.3–6.2)
ERYTHROCYTE [DISTWIDTH] IN BLOOD BY AUTOMATED COUNT: 13.6 % (ref 12.3–15.4)
GFR SERPL CREATININE-BSD FRML MDRD: 31 ML/MIN/1.73
GLUCOSE BLDC GLUCOMTR-MCNC: 218 MG/DL (ref 70–130)
GLUCOSE BLDC GLUCOMTR-MCNC: 266 MG/DL (ref 70–130)
GLUCOSE SERPL-MCNC: 284 MG/DL (ref 65–99)
HCT VFR BLD AUTO: 33.4 % (ref 37.5–51)
HGB BLD-MCNC: 10.5 G/DL (ref 13–17.7)
IMM GRANULOCYTES # BLD AUTO: 0.05 10*3/MM3 (ref 0–0.05)
IMM GRANULOCYTES NFR BLD AUTO: 0.6 % (ref 0–0.5)
LYMPHOCYTES # BLD AUTO: 1.61 10*3/MM3 (ref 0.7–3.1)
LYMPHOCYTES NFR BLD AUTO: 20.3 % (ref 19.6–45.3)
MCH RBC QN AUTO: 26.9 PG (ref 26.6–33)
MCHC RBC AUTO-ENTMCNC: 31.4 G/DL (ref 31.5–35.7)
MCV RBC AUTO: 85.4 FL (ref 79–97)
MONOCYTES # BLD AUTO: 0.87 10*3/MM3 (ref 0.1–0.9)
MONOCYTES NFR BLD AUTO: 11 % (ref 5–12)
NEUTROPHILS NFR BLD AUTO: 4.88 10*3/MM3 (ref 1.7–7)
NEUTROPHILS NFR BLD AUTO: 61.7 % (ref 42.7–76)
NRBC BLD AUTO-RTO: 0 /100 WBC (ref 0–0.2)
PLATELET # BLD AUTO: 238 10*3/MM3 (ref 140–450)
PMV BLD AUTO: 11.4 FL (ref 6–12)
POTASSIUM SERPL-SCNC: 4 MMOL/L (ref 3.5–5.2)
RBC # BLD AUTO: 3.91 10*6/MM3 (ref 4.14–5.8)
SODIUM SERPL-SCNC: 138 MMOL/L (ref 136–145)
WBC NRBC COR # BLD: 7.92 10*3/MM3 (ref 3.4–10.8)

## 2021-12-11 PROCEDURE — 85025 COMPLETE CBC W/AUTO DIFF WBC: CPT | Performed by: FAMILY MEDICINE

## 2021-12-11 PROCEDURE — 25010000002 ENOXAPARIN PER 10 MG: Performed by: FAMILY MEDICINE

## 2021-12-11 PROCEDURE — 94799 UNLISTED PULMONARY SVC/PX: CPT

## 2021-12-11 PROCEDURE — 82962 GLUCOSE BLOOD TEST: CPT

## 2021-12-11 PROCEDURE — 63710000001 INSULIN LISPRO (HUMAN) PER 5 UNITS: Performed by: FAMILY MEDICINE

## 2021-12-11 PROCEDURE — 80048 BASIC METABOLIC PNL TOTAL CA: CPT | Performed by: FAMILY MEDICINE

## 2021-12-11 PROCEDURE — 63710000001 INSULIN DETEMIR PER 5 UNITS: Performed by: FAMILY MEDICINE

## 2021-12-11 RX ORDER — BUMETANIDE 2 MG/1
2 TABLET ORAL 2 TIMES DAILY
Qty: 60 TABLET | Refills: 2 | Status: SHIPPED | OUTPATIENT
Start: 2021-12-11 | End: 2022-07-13

## 2021-12-11 RX ADMIN — PANTOPRAZOLE SODIUM 40 MG: 40 TABLET, DELAYED RELEASE ORAL at 06:46

## 2021-12-11 RX ADMIN — BUSPIRONE HYDROCHLORIDE 10 MG: 10 TABLET ORAL at 08:23

## 2021-12-11 RX ADMIN — IPRATROPIUM BROMIDE AND ALBUTEROL SULFATE 3 ML: 2.5; .5 SOLUTION RESPIRATORY (INHALATION) at 14:21

## 2021-12-11 RX ADMIN — ENOXAPARIN SODIUM 40 MG: 40 INJECTION SUBCUTANEOUS at 08:21

## 2021-12-11 RX ADMIN — INSULIN DETEMIR 50 UNITS: 100 INJECTION, SOLUTION SUBCUTANEOUS at 08:22

## 2021-12-11 RX ADMIN — GABAPENTIN 600 MG: 300 CAPSULE ORAL at 06:46

## 2021-12-11 RX ADMIN — Medication: at 08:24

## 2021-12-11 RX ADMIN — IPRATROPIUM BROMIDE AND ALBUTEROL SULFATE 3 ML: 2.5; .5 SOLUTION RESPIRATORY (INHALATION) at 06:33

## 2021-12-11 RX ADMIN — CARVEDILOL 6.25 MG: 6.25 TABLET, FILM COATED ORAL at 08:23

## 2021-12-11 RX ADMIN — INSULIN LISPRO 6 UNITS: 100 INJECTION, SOLUTION INTRAVENOUS; SUBCUTANEOUS at 11:43

## 2021-12-11 RX ADMIN — INSULIN LISPRO 4 UNITS: 100 INJECTION, SOLUTION INTRAVENOUS; SUBCUTANEOUS at 08:22

## 2021-12-11 RX ADMIN — HYDROCODONE BITARTRATE AND ACETAMINOPHEN 1 TABLET: 7.5; 325 TABLET ORAL at 01:47

## 2021-12-11 RX ADMIN — GABAPENTIN 600 MG: 300 CAPSULE ORAL at 13:56

## 2021-12-11 RX ADMIN — BUMETANIDE 2 MG: 1 TABLET ORAL at 08:23

## 2021-12-11 RX ADMIN — DONEPEZIL HYDROCHLORIDE 5 MG: 5 TABLET, FILM COATED ORAL at 08:23

## 2021-12-11 RX ADMIN — TAMSULOSIN HYDROCHLORIDE 0.4 MG: 0.4 CAPSULE ORAL at 08:23

## 2021-12-11 RX ADMIN — SODIUM CHLORIDE, PRESERVATIVE FREE 10 ML: 5 INJECTION INTRAVENOUS at 08:22

## 2021-12-11 RX ADMIN — DULOXETINE HYDROCHLORIDE 60 MG: 30 CAPSULE, DELAYED RELEASE ORAL at 08:23

## 2021-12-11 RX ADMIN — ASPIRIN 81 MG: 81 TABLET, COATED ORAL at 08:23

## 2021-12-11 NOTE — DISCHARGE SUMMARY
[facility] Medicine Services  DISCHARGE SUMMARY       Date of Admission: 12/8/2021  Date of Discharge:  12/11/2021  Primary Care Physician: Del Shetty MD    Discharge Diagnoses:  Active Hospital Problems    Diagnosis    • **Anasarca    • Diabetic polyneuropathy associated with type 2 diabetes mellitus (HCC)    • Medically noncompliant    • Diabetic foot ulcer (HCC)    • CKD (chronic kidney disease) stage 4, GFR 15-29 ml/min (Formerly [facility])    • Sleep apnea with use of continuous positive airway pressure (CPAP)    • Class 3 severe obesity due to excess calories with body mass index (BMI) of 45.0 to 49.9 in adult (Formerly [facility])    • Anemia due to chronic kidney disease    • Essential hypertension    • Type 2 diabetes mellitus with hyperglycemia, with long-term current use of insulin (Formerly [facility])          Presenting Problem/History of Present Illness:  Anasarca [R60.1]     Chief Complaint on Day of Discharge:   Generalized swelling    History of Present Illness on Day of Discharge:   The patient's generalized swelling has resolved significantly over the past few days.  He has diuresed over 16,000 cc since admission and is feeling much better.  His creatinine has stabilized at 2.17 which is slightly higher than his typical baseline.  He has no complaints of shortness of breath and is able to ambulate without difficulty.  He continues with a 1500 cc fluid restriction.  He will be discharged with a prescription for Bumex and other medications as needed.    Hospital Course   66 year old male with PMH of CAD, DM ID, HTN, CKD, BRITTNI, that presents to the ER with complaints of leg edema and fluid retention. He has tried Bumex or and Metolazone recently added but continues to have worsening edema and weight wain. Tonight he felt short of breath with activity and pulse ox at home was read as low 80%, he did not present hypoxemic to to the ER or distressed.   Plan:   Admit to medical floor observation  Vitals  every 4 hours  Cardiac, diabetic, renal diet with fluid restriction  IVF saline lock  Routine labs  Bumex drip diuresis  Nephrology consult  Local wound care for amputation site  Humalog sliding scale  Baseline insulin Levemir 50 Unit BID     The patient was also placed on a 1500 cc fluid restriction.  Creatinine was elevated in the emergency department at 2.09.  Nephrology consulted on the patient evaluated and made recommendations.  The patient was placed on a Bumex drip at 1 mg/h and diuresed freely during his hospital stay.  After he had diuresed 4000 cc, he was transitioned to oral Bumex 2 mg p.o. twice daily and had an additional 4000 cc of diuresis.  He is doing well today with no dyspnea and significantly decreased anasarca.  Renal function is stable with creatinine 2.17.  This is slightly higher than his baseline but is explainable on the basis of aggressive diuresis.  Is doing well today and is stable for discharge home.    Consults:   Nephrology:  Assessment   1.  Acute kidney injury  2.  Baseline chronic kidney disease stage 3b  3.  Anasarca/volume overload  4.  Type 2 diabetes  5.  Hypertension  6.  CAD with prior CABG  7.  Obstructive sleep apnea  8.  Obesity   9.  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  Continue Bumex drip, good response so far  2.  Continue fluid restriction  3.  Monitor labs  4.  Further assessment and plan pending Dr Arteaga's evaluation of patient        Thank you for the consult, we appreciate the opportunity to provide care to your patients.  Feel free to contact me if I can be of any further assistance.       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     Plan:  Continue Bumex drip with close lab  monitoring  Workup ordered and ongoing  Diet/fluid/med education        Willy Arteaga MD    Pertinent Test Results:   Lab Results (last 7 days)     Procedure Component Value Units Date/Time    POC Glucose Once [[id number]]  (Abnormal) Collected: 12/11/21 1109    Specimen: Blood Updated: 12/11/21 1132     Glucose 266 mg/dL      Comment: : [id number] Justo LadonnaMeter ID: [id number]       POC Glucose Once [[id number]]  (Abnormal) Collected: 12/11/21 0739    Specimen: Blood Updated: 12/11/21 0815     Glucose 218 mg/dL      Comment: : [id number] Justo LadonnaMeter ID: [id number]       Basic Metabolic Panel [[id number]]  (Abnormal) Collected: 12/11/21 0332    Specimen: Blood Updated: 12/11/21 0444     Glucose 284 mg/dL      BUN 44 mg/dL      Creatinine 2.17 mg/dL      Sodium 138 mmol/L      Potassium 4.0 mmol/L      Chloride 92 mmol/L      CO2 32.0 mmol/L      Calcium 9.0 mg/dL      eGFR Non African Amer 31 mL/min/1.73      BUN/Creatinine Ratio 20.3     Anion Gap 14.0 mmol/L     Narrative:      GFR Normal >60  Chronic Kidney Disease <60  Kidney Failure <15      CBC & Differential [[id number]]  (Abnormal) Collected: 12/11/21 0332    Specimen: Blood Updated: 12/11/21 0429    Narrative:      The following orders were created for panel order CBC & Differential.  Procedure                               Abnormality         Status                     ---------                               -----------         ------                     CBC Auto Differential[[id number]]        Abnormal            Final result                 Please view results for these tests on the individual orders.    CBC Auto Differential [[id number]]  (Abnormal) Collected: 12/11/21 0332    Specimen: Blood Updated: 12/11/21 0429     WBC 7.92 10*3/mm3      RBC 3.91 10*6/mm3      Hemoglobin 10.5 g/dL      Hematocrit 33.4 %      MCV 85.4 fL      MCH 26.9 pg      MCHC 31.4 g/dL      RDW 13.6 %      RDW-SD 42.2 fl      MPV 11.4 fL       Platelets 238 10*3/mm3      Neutrophil % 61.7 %      Lymphocyte % 20.3 %      Monocyte % 11.0 %      Eosinophil % 5.9 %      Basophil % 0.5 %      Immature Grans % 0.6 %      Neutrophils, Absolute 4.88 10*3/mm3      Lymphocytes, Absolute 1.61 10*3/mm3      Monocytes, Absolute 0.87 10*3/mm3      Eosinophils, Absolute 0.47 10*3/mm3      Basophils, Absolute 0.04 10*3/mm3      Immature Grans, Absolute 0.05 10*3/mm3      nRBC 0.0 /100 WBC     POC Glucose Once [[id number]]  (Abnormal) Collected: 12/10/21 2005    Specimen: Blood Updated: 12/10/21 2018     Glucose 258 mg/dL      Comment: : [id number] Blakemore DominiqueMeter ID: [id number]       POC Glucose Once [[id number]]  (Abnormal) Collected: 12/10/21 1623    Specimen: Blood Updated: 12/10/21 1636     Glucose 333 mg/dL      Comment: : [id number] Saran EuraisaMeter ID: [id number]       POC Glucose Once [[id number]]  (Abnormal) Collected: 12/10/21 1056    Specimen: Blood Updated: 12/10/21 1112     Glucose 256 mg/dL      Comment: : [id number] Toreye CieraMeter ID: [id number]       POC Glucose Once [[id number]]  (Abnormal) Collected: 12/09/21 1605    Specimen: Blood Updated: 12/10/21 0808     Glucose 339 mg/dL      Comment: : [id number] Heydi WalkerineMeter ID: [id number]       POC Glucose Once [[id number]]  (Abnormal) Collected: 12/10/21 0729    Specimen: Blood Updated: 12/10/21 0740     Glucose 171 mg/dL      Comment: : [id number] Kellykle CieraMeter ID: [id number]       Basic Metabolic Panel [[id number]]  (Abnormal) Collected: 12/10/21 0453    Specimen: Blood Updated: 12/10/21 0600     Glucose 213 mg/dL      BUN 44 mg/dL      Creatinine 2.23 mg/dL      Sodium 139 mmol/L      Potassium 4.0 mmol/L      Chloride 95 mmol/L      CO2 35.0 mmol/L      Calcium 8.8 mg/dL      eGFR Non African Amer 30 mL/min/1.73      BUN/Creatinine Ratio 19.7     Anion Gap 9.0 mmol/L     Narrative:      GFR Normal >60  Chronic Kidney Disease <60  Kidney Failure <15      CBC &  Differential [[id number]]  (Abnormal) Collected: 12/10/21 0453    Specimen: Blood Updated: 12/10/21 0544    Narrative:      The following orders were created for panel order CBC & Differential.  Procedure                               Abnormality         Status                     ---------                               -----------         ------                     CBC Auto Differential[[id number]]        Abnormal            Final result                 Please view results for these tests on the individual orders.    CBC Auto Differential [[id number]]  (Abnormal) Collected: 12/10/21 0453    Specimen: Blood Updated: 12/10/21 0544     WBC 6.25 10*3/mm3      RBC 3.54 10*6/mm3      Hemoglobin 9.9 g/dL      Hematocrit 31.4 %      MCV 88.7 fL      MCH 28.0 pg      MCHC 31.5 g/dL      RDW 13.6 %      RDW-SD 44.4 fl      MPV 11.5 fL      Platelets 227 10*3/mm3      Neutrophil % 54.2 %      Lymphocyte % 25.9 %      Monocyte % 12.3 %      Eosinophil % 6.7 %      Basophil % 0.6 %      Immature Grans % 0.3 %      Neutrophils, Absolute 3.38 10*3/mm3      Lymphocytes, Absolute 1.62 10*3/mm3      Monocytes, Absolute 0.77 10*3/mm3      Eosinophils, Absolute 0.42 10*3/mm3      Basophils, Absolute 0.04 10*3/mm3      Immature Grans, Absolute 0.02 10*3/mm3      nRBC 0.0 /100 WBC     POC Glucose Once [[id number]]  (Abnormal) Collected: 12/09/21 2047    Specimen: Blood Updated: 12/09/21 2109     Glucose 351 mg/dL      Comment: : [id number] Blakemore DominiqueMeter ID: [id number]       POC Glucose Once [[id number]]  (Abnormal) Collected: 12/09/21 1057    Specimen: Blood Updated: 12/09/21 1128     Glucose 206 mg/dL      Comment: : [id number] Justo WattSproxilaMeter ID: [id number]       POC Glucose Once [[id number]]  (Normal) Collected: 12/09/21 0726    Specimen: Blood Updated: 12/09/21 0806     Glucose 130 mg/dL      Comment: : [id number] Justo WattonnaMeter ID: [id number]       Basic Metabolic Panel [[id number]]  (Abnormal) Collected:  12/09/21 0519    Specimen: Blood Updated: 12/09/21 0613     Glucose 127 mg/dL      BUN 37 mg/dL      Creatinine 2.01 mg/dL      Sodium 138 mmol/L      Potassium 3.8 mmol/L      Chloride 99 mmol/L      CO2 29.0 mmol/L      Calcium 8.6 mg/dL      eGFR Non African Amer 34 mL/min/1.73      BUN/Creatinine Ratio 18.4     Anion Gap 10.0 mmol/L     Narrative:      GFR Normal >60  Chronic Kidney Disease <60  Kidney Failure <15      CBC & Differential [[id number]]  (Abnormal) Collected: 12/09/21 0519    Specimen: Blood Updated: 12/09/21 0544    Narrative:      The following orders were created for panel order CBC & Differential.  Procedure                               Abnormality         Status                     ---------                               -----------         ------                     CBC Auto Differential[[id number]]        Abnormal            Final result                 Please view results for these tests on the individual orders.    CBC Auto Differential [[id number]]  (Abnormal) Collected: 12/09/21 0519    Specimen: Blood Updated: 12/09/21 0544     WBC 7.32 10*3/mm3      RBC 3.35 10*6/mm3      Hemoglobin 9.1 g/dL      Hematocrit 30.0 %      MCV 89.6 fL      MCH 27.2 pg      MCHC 30.3 g/dL      RDW 13.9 %      RDW-SD 45.1 fl      MPV 11.2 fL      Platelets 207 10*3/mm3      Neutrophil % 71.1 %      Lymphocyte % 14.9 %      Monocyte % 10.0 %      Eosinophil % 3.0 %      Basophil % 0.3 %      Immature Grans % 0.7 %      Neutrophils, Absolute 5.21 10*3/mm3      Lymphocytes, Absolute 1.09 10*3/mm3      Monocytes, Absolute 0.73 10*3/mm3      Eosinophils, Absolute 0.22 10*3/mm3      Basophils, Absolute 0.02 10*3/mm3      Immature Grans, Absolute 0.05 10*3/mm3      nRBC 0.0 /100 WBC     POC Glucose Once [[id number]]  (Normal) Collected: 12/09/21 0035    Specimen: Blood Updated: 12/09/21 0047     Glucose 78 mg/dL      Comment: : [id number] Deidra IzquierdoMeter ID: [id number]       [place] Draw [[id number]] Collected:  12/08/21 2041    Specimen: Blood Updated: 12/09/21 0045    Narrative:      The following orders were created for panel order [place] Draw.  Procedure                               Abnormality         Status                     ---------                               -----------         ------                     Green Top (Gel)[[id number]]                                  Final result               Lavender Top[[id number]]                                     Final result               Red Top[[id number]]                                          Final result               Gray Top[[id number]]                                         Final result               Light Blue Top[[id number]]                                   Final result                 Please view results for these tests on the individual orders.    North Top [[id number]] Collected: 12/08/21 2041    Specimen: Blood Updated: 12/09/21 0045     Extra Tube Hold for add-ons.     Comment: Auto resulted.       COVID PRE-OP / PRE-PROCEDURE SCREENING ORDER (NO ISOLATION) - Swab, Nasal Cavity [[id number]]  (Normal) Collected: 12/08/21 2317    Specimen: Swab from Nasal Cavity Updated: 12/09/21 0009    Narrative:      The following orders were created for panel order COVID PRE-OP / PRE-PROCEDURE SCREENING ORDER (NO ISOLATION) - Swab, Nasal Cavity.  Procedure                               Abnormality         Status                     ---------                               -----------         ------                     COVID-19,[facility]...[[id number]]  Normal              Final result                 Please view results for these tests on the individual orders.    COVID-19,[facility] IN-HOUSE,Nasal Swab No Transport Media 3-4 HR TAT - Swab, Nasal Cavity [[id number]]  (Normal) Collected: 12/08/21 2317    Specimen: Swab from Nasal Cavity Updated: 12/09/21 0009     COVID19 Not Detected    Narrative:      Fact sheet for providers: https://www.fda.gov/media/760770/download      Fact sheet for patients: https://www.[employer].gov/media/970676/download    Test performed by PCR.    Consider negative results in combination with clinical observations, patient history, and epidemiological information.    Green Top (Gel) [[id number]] Collected: 12/08/21 2041    Specimen: Blood Updated: 12/08/21 2145     Extra Tube Hold for add-ons.     Comment: Auto resulted.       Lavender Top [[id number]] Collected: 12/08/21 2041    Specimen: Blood Updated: 12/08/21 2145     Extra Tube hold for add-on     Comment: Auto resulted       Red Top [[id number]] Collected: 12/08/21 2041    Specimen: Blood Updated: 12/08/21 2145     Extra Tube Hold for add-ons.     Comment: Auto resulted.       Light Blue Top [[id number]] Collected: 12/08/21 2041    Specimen: Blood Updated: 12/08/21 2145     Extra Tube hold for add-on     Comment: Auto resulted       Urinalysis With Microscopic If Indicated (No Culture) - Urine, Clean Catch [[id number]]  (Abnormal) Collected: 12/08/21 2112    Specimen: Urine, Clean Catch Updated: 12/08/21 2139     Color, UA Yellow     Appearance, UA Clear     pH, UA 6.5     Specific Gravity, UA 1.019     Glucose, UA >=1000 mg/dL (3+)     Ketones, UA Negative     Bilirubin, UA Negative     Blood, UA Negative     Protein, UA 30 mg/dL (1+)     Leuk Esterase, UA Negative     Nitrite, UA Negative     Urobilinogen, UA 1.0 E.U./dL    Urinalysis, Microscopic Only - Urine, Clean Catch [[id number]] Collected: 12/08/21 2112    Specimen: Urine, Clean Catch Updated: 12/08/21 2139     RBC, UA None Seen /HPF      WBC, UA None Seen /HPF      Bacteria, UA None Seen /HPF      Squamous Epithelial Cells, UA 0-2 /HPF      Methodology Manual Light Microscopy    Comprehensive Metabolic Panel [[id number]]  (Abnormal) Collected: 12/08/21 2041    Specimen: Blood Updated: 12/08/21 2118     Glucose 276 mg/dL      BUN 36 mg/dL      Creatinine 2.09 mg/dL      Sodium 137 mmol/L      Potassium 4.1 mmol/L      Chloride 98 mmol/L      CO2 27.0  mmol/L      Calcium 8.6 mg/dL      Total Protein 6.7 g/dL      Albumin 3.70 g/dL      ALT (SGPT) 11 U/L      AST (SGOT) 15 U/L      Alkaline Phosphatase 109 U/L      Total Bilirubin 0.4 mg/dL      eGFR Non African Amer 32 mL/min/1.73      Globulin 3.0 gm/dL      A/G Ratio 1.2 g/dL      BUN/Creatinine Ratio 17.2     Anion Gap 12.0 mmol/L     Narrative:      GFR Normal >60  Chronic Kidney Disease <60  Kidney Failure <15      Troponin [[id number]]  (Normal) Collected: 12/08/21 2041    Specimen: Blood Updated: 12/08/21 2114     Troponin T <0.010 ng/mL     Narrative:      Troponin T Reference Range:  <= 0.03 ng/mL-   Negative for AMI  >0.03 ng/mL-     Abnormal for myocardial necrosis.  Clinicians would have to utilize clinical acumen, EKG, Troponin and serial changes to determine if it is an Acute Myocardial Infarction or myocardial injury due to an underlying chronic condition.       Results may be falsely decreased if patient taking Biotin.      BNP [[id number]]  (Abnormal) Collected: 12/08/21 2041    Specimen: Blood Updated: 12/08/21 2114     proBNP 1,308.0 pg/mL     Narrative:      Among patients with dyspnea, NT-proBNP is highly sensitive for the detection of acute congestive heart failure. In addition NT-proBNP of <300 pg/ml effectively rules out acute congestive heart failure with 99% negative predictive value.    Results may be falsely decreased if patient taking Biotin.      Magnesium [[id number]]  (Normal) Collected: 12/08/21 2041    Specimen: Blood Updated: 12/08/21 2112     Magnesium 2.2 mg/dL     CBC & Differential [[id number]]  (Abnormal) Collected: 12/08/21 2041    Specimen: Blood Updated: 12/08/21 2056    Narrative:      The following orders were created for panel order CBC & Differential.  Procedure                               Abnormality         Status                     ---------                               -----------         ------                     CBC Auto Differential[[id number]]        Abnormal  "           Final result                 Please view results for these tests on the individual orders.    CBC Auto Differential [[id number]]  (Abnormal) Collected: 12/08/21 2041    Specimen: Blood Updated: 12/08/21 2056     WBC 6.99 10*3/mm3      RBC 3.32 10*6/mm3      Hemoglobin 9.1 g/dL      Hematocrit 29.7 %      MCV 89.5 fL      MCH 27.4 pg      MCHC 30.6 g/dL      RDW 13.8 %      RDW-SD 45.0 fl      MPV 10.6 fL      Platelets 206 10*3/mm3      Neutrophil % 65.9 %      Lymphocyte % 17.9 %      Monocyte % 10.3 %      Eosinophil % 4.9 %      Basophil % 0.4 %      Immature Grans % 0.6 %      Neutrophils, Absolute 4.61 10*3/mm3      Lymphocytes, Absolute 1.25 10*3/mm3      Monocytes, Absolute 0.72 10*3/mm3      Eosinophils, Absolute 0.34 10*3/mm3      Basophils, Absolute 0.03 10*3/mm3      Immature Grans, Absolute 0.04 10*3/mm3      nRBC 0.0 /100 WBC         Imaging Results (Last 7 Days)     Procedure Component Value Units Date/Time    XR Chest 1 View [[id number]] Collected: 12/08/21 2056     Updated: 12/08/21 2101    Narrative:      EXAMINATION: XR CHEST 1 VW- 12/8/2021 8:56 PM CST     HISTORY: SOB.     REPORT: Frontal view the chest was obtained.     COMPARISON: Chest x-ray 11/7/2021.     The lungs are grossly hypoaerated, there are central basilar vascular  crowding, with borderline cardiomegaly. Scattered calcified granulomas  are present. There is no lung consolidation. Previous CABG is evident.  No pneumothorax or pleural effusion is identified. There is previous  ACDF.       Impression:      Gross hypoaeration of the lungs with central and basilar  vascular crowding, no overt evidence of CHF or pneumonia.  This report was finalized on 12/08/2021 20:58 by Dr. Percy Cesar MD.          Condition on Discharge:    Stable    Physical Exam on Discharge:  /69 (BP Location: Left arm, Patient Position: Sitting)   Pulse 77   Temp 98.1 °F (36.7 °C) (Oral)   Resp 16   Ht 182.9 cm (72\")   Wt (!) 150 kg (331 lb " 1.6 oz) Comment: bed  SpO2 93%   BMI 44.91 kg/m²   Physical Exam     Constitutional:       General: He is not in acute distress.     Appearance: He is morbidly obese. He is ill-appearing. He is not toxic-appearing.      Comments: Sitting in a chair at bedside. No acute distress.    On room air with good saturations.   HENT:      Head: Normocephalic and atraumatic.      Mouth: Mucous membranes are moist.      Pharynx: Oropharynx is clear.   Eyes:      Conjunctiva/sclera: Conjunctivae normal.   Cardiovascular:      Rate and Rhythm: Normal rate and regular rhythm.      Pulses: Normal pulses.      Heart sounds: No murmur heard.  Pulmonary:      Effort: Pulmonary effort is normal. No respiratory distress.      Breath sounds: Normal breath sounds. No wheezing.   Abdominal:      General: Bowel sounds are normal.  Abdomen is protuberant.     Palpations: Abdomen is soft.      Tenderness: There is no abdominal tenderness.   Musculoskeletal:         General: No tenderness. Normal range of motion.      Cervical back: Normal range of motion and neck supple. No muscular tenderness.      Right lower leg: Edema  resolved.      Left lower leg: Edema  resolved.   Skin:     General: Skin is warm and dry.      Findings: No erythema or rash.   Neurological:      General: No focal deficit present.      Mental Status: He is alert and oriented to person, place, and time.   Psychiatric:         Mood and Affect: Mood normal.         Behavior: Behavior normal.     Discharge Disposition:  Home or Self Care    Discharge Medications:     Discharge Medications      Changes to Medications      Instructions Start Date   bumetanide 2 MG tablet  Commonly known as: BUMEX  What changed:   · medication strength  · how much to take   2 mg, Oral, 2 Times Daily      traZODone 100 MG tablet  Commonly known as: DESYREL  What changed: how much to take   100 mg, Oral, Nightly         Continue These Medications      Instructions Start Date   aspirin 81 MG EC  tablet   81 mg, Oral, Daily      busPIRone 10 MG tablet  Commonly known as: BUSPAR   Take 1 tablet 3 times a day by oral route as needed.      carvedilol 6.25 MG tablet  Commonly known as: COREG   6.25 mg, Oral, 2 Times Daily      colchicine 0.6 MG tablet   Take 1 tablet by mouth 2 (two) times a day.      cyclobenzaprine 5 MG tablet  Commonly known as: FLEXERIL   Take 1 tablet by mouth 2 (Two) Times a Day As Needed for muscle spasms      donepezil 5 MG tablet  Commonly known as: ARICEPT   5 mg, Oral, Daily      DULoxetine 60 MG capsule  Commonly known as: CYMBALTA   60 mg, Oral, Daily      gabapentin 600 MG tablet  Commonly known as: NEURONTIN   600 mg, Oral, 3 Times Daily      HumaLOG KwikPen 100 UNIT/ML solution pen-injector  Generic drug: Insulin Lispro (1 Unit Dial)   Inject according to sliding scale:  glucose=151-200 use 2 UNITS; glucose=201-250 use 4 units; glucose=251-300 use 6 units; above 301 use 8 units      HumuLIN R U-500 KwikPen 500 UNIT/ML solution pen-injector CONCENTRATED injection  Generic drug: Insulin Regular Human (Conc)   Inject 120 units under the skin into the appropriate area as directed Every Morning with Breakfast  units Every Evening with Supper.      HYDROcodone-acetaminophen 7.5-325 MG per tablet  Commonly known as: NORCO   1 tablet, Oral, 2 Times Daily PRN      ipratropium-albuterol 0.5-2.5 mg/3 ml nebulizer  Commonly known as: DUO-NEB   3 mL, Nebulization, Every 6 Hours      Kerendia 10 MG tablet  Generic drug: Finerenone   10 mg, Oral, Daily      pantoprazole 40 MG EC tablet  Commonly known as: PROTONIX   Take 1 tablet by mouth twice a day.      prazosin 2 MG capsule  Commonly known as: MINIPRESS   2 mg, Oral, Every Night at Bedtime      rosuvastatin 40 MG tablet  Commonly known as: CRESTOR   40 mg, Oral, Nightly      sodium hypochlorite 0.25 % topical solution  Commonly known as: DAKIN'S   Apply to wound twice a day as directed      tamsulosin 0.4 MG capsule 24 hr  capsule  Commonly known as: FLOMAX   1 capsule, Oral, Daily      Trulicity 1.5 MG/0.5ML solution pen-injector  Generic drug: Dulaglutide   1.5 mg, Subcutaneous, Weekly         Stop These Medications    irbesartan 300 MG tablet  Commonly known as: AVAPRO     metOLazone 2.5 MG tablet  Commonly known as: ZAROXOLYN     ondansetron ODT 4 MG disintegrating tablet  Commonly known as: ZOFRAN-ODT            Discharge Diet:   Diet Instructions     Diet: Consistent Carbohydrate; Thin      Discharge Diet: Consistent Carbohydrate    Fluid Consistency: Thin          Discharge Care Plan / Instructions:   Discharge home    Activity at Discharge:   Activity Instructions     Activity as Tolerated            Follow-up Appointments:  Follow-up with primary care physician next week  Follow-up with nephrology as scheduled       Electronically signed by Payam Keyes DO, 12/11/21, 15:07 CST.    Time: Discharge over 30 min    Part of this note may be an electronic transcription/translation of spoken language to printed text using the Dragon Dictation system.

## 2021-12-11 NOTE — CASE MANAGEMENT/SOCIAL WORK
Continued Stay Note  [facility]     Patient Name: Erick Luong  MRN: [id number]  Today's Date: 12/11/2021    Admit Date: 12/8/2021     Discharge Plan     Row Name 12/11/21 1612       Plan    Final Discharge Disposition Code 06 - home with home health care    Final Note PT is being dcd home today with continued services from [facility]. Informed RN that  will need updated  orders to send to [facility]. Will send orders once received.               Discharge Codes    No documentation.               Expected Discharge Date and Time     Expected Discharge Date Expected Discharge Time    Dec 11, 2021             HAFSA Moore

## 2021-12-11 NOTE — PLAN OF CARE
Problem: Wound  Goal: Optimal Wound Healing  Outcome: Ongoing, Progressing  Intervention: Promote Effective Wound Healing  Recent Flowsheet Documentation  Taken 12/11/2021 0100 by Suzette Au RN  Pain Management Interventions: see MAR     Problem: Adult Inpatient Plan of Care  Goal: Plan of Care Review  Outcome: Ongoing, Progressing  Flowsheets (Taken 12/11/2021 0545)  Progress: no change  Plan of Care Reviewed With: patient  Outcome Summary: Patient has has mild c/o pain.  He is alert and oriented. He has been up in the chair all night.  Legs elevated part of the time but part of the time not. Refused to get in the bed.  Descent output.  1500 Fluid Restriction.  2mg Bumex PO BID.  Astor Bid PRN.  His Left foot Little toe was amputated.  Dressing changes BID with Dakins.  He still is refusing to wear CPAP.  S 68-96  Cont. to monitor.  Call for further concerns.   Goal Outcome Evaluation:  Plan of Care Reviewed With: patient        Progress: no change  Outcome Summary: Patient has has mild c/o pain.  He is alert and oriented. He has been up in the chair all night.  Legs elevated part of the time but part of the time not. Refused to get in the bed.  Descent output.  1500 Fluid Restriction.  2mg Bumex PO BID.  Astor Bid PRN.  His Left foot Little toe was amputated.  Dressing changes BID with Dakins.  He still is refusing to wear CPAP.  S 68-96  Cont. to monitor.  Call for further concerns.

## 2021-12-11 NOTE — PROGRESS NOTES
Nephrology ([facility]) Progress Note      Patient:  Erick Luong  YOB: 1955  Date of Service: 12/11/2021  MRN: [id number]   Acct: [id number]   Primary Care Physician: Del Shetty MD  Advance Directive:   Code Status and Medical Interventions:   Ordered at: 12/08/21 2241     Code Status (Patient has no pulse and is not breathing):    CPR (Attempt to Resuscitate)     Medical Interventions (Patient has pulse or is breathing):    Full Support     Admit Date: 12/8/2021       Hospital Day: 3  Referring Provider: Garrett Alicia,*      Patient personally seen and examined.  Complete chart including Consults, Notes, Operative Reports, Labs, Cardiology, and Radiology studies reviewed as able.        Subjective:  Erick Luong is a 66 y.o. male for whom we were consulted for evaluation and treatment of chronic kidney disease stage 3b with anasarca. Patient well known to our office due to multiple hospitalizations for similar issues. Baseline CKD stage 3b, baseline creatinine 1.6.  Patient was seen in our office earlier this week by Stewart DAVIS. Patient noted to be significantly volume overloaded at that time, weight had increased well over 20 pounds. Creatinine was 2.0.  Patient had been taking Bumex 1 mg BID and Metolazone was added.  Presented to [facility] on 12/8 with increased dyspnea, edema/anasarca.  Initial creatinine in ER was 2.09. placed on Bumex drip. Currently patient is feeling a little better, maintaining oxygen saturation above 95% on room air. Abdomen and lower extremities still very edematous. Urine output nonoliguric.     Today, edema improving.  Now off bumex drip with symptomatic improvement. Denied current chest pain, shortness of air at rest, nausea or vomiting.    Allergies:  Cefepime, Bactrim [sulfamethoxazole-trimethoprim], Clindamycin, Vancomycin, and Metronidazole    Home Meds:  Medications Prior to Admission   Medication Sig  Dispense Refill Last Dose   • bumetanide (BUMEX) 1 MG tablet Take 1 tablet by mouth 2 (Two) Times a Day. 60 tablet 6 12/8/2021 at Unknown time   • busPIRone (BUSPAR) 10 MG tablet Take 1 tablet 3 times a day by oral route as needed. 45 tablet 2 12/8/2021 at Unknown time   • carvedilol (COREG) 6.25 MG tablet Take 1 tablet by mouth 2 (Two) Times a Day. 180 tablet 4 12/8/2021 at Unknown time   • colchicine 0.6 MG tablet Take 1 tablet by mouth 2 (two) times a day. 28 tablet 2 Past Month at Unknown time   • cyclobenzaprine (FLEXERIL) 5 MG tablet Take 1 tablet by mouth 2 (Two) Times a Day As Needed for muscle spasms 60 tablet 5 Past Month at Unknown time   • donepezil (ARICEPT) 5 MG tablet Take 1 tablet by mouth Daily. 90 tablet 4 12/8/2021 at Unknown time   • DULoxetine (CYMBALTA) 60 MG capsule Take 60 mg by mouth Daily.   12/8/2021 at Unknown time   • Finerenone (Kerendia) 10 MG tablet Take 10 mg by mouth Daily. 30 tablet 6 12/8/2021 at Unknown time   • gabapentin (NEURONTIN) 600 MG tablet Take 1 tablet by mouth 3 (Three) Times a Day. 90 tablet 2 12/8/2021 at Unknown time   • HYDROcodone-acetaminophen (NORCO) 7.5-325 MG per tablet Take 1 tablet by mouth 2 (Two) Times a Day As Needed. 45 tablet 0 12/8/2021 at Unknown time   • Insulin Lispro, 1 Unit Dial, (HumaLOG KwikPen) 100 UNIT/ML solution pen-injector Inject according to sliding scale:  glucose=151-200 use 2 UNITS; glucose=201-250 use 4 units; glucose=251-300 use 6 units; above 301 use 8 units 18 mL 11 12/8/2021 at Unknown time   • Insulin Regular Human, Conc, (HumuLIN R U-500 KwikPen) 500 UNIT/ML solution pen-injector CONCENTRATED injection Inject 120 units under the skin into the appropriate area as directed Every Morning with Breakfast  units Every Evening with Supper. 18 mL 11 12/8/2021 at Unknown time   • ipratropium-albuterol (DUO-NEB) 0.5-2.5 mg/3 ml nebulizer Take 3 mL by nebulization Every 6 (Six) Hours. 360 mL 0 Past Month at Unknown time   •  irbesartan (AVAPRO) 300 MG tablet Take 1 tablet by mouth Daily. 90 tablet 1 12/8/2021 at Unknown time   • metOLazone (ZAROXOLYN) 2.5 MG tablet Take 1 tablet by mouth Daily. 30 tablet 2 12/8/2021 at Unknown time   • pantoprazole (PROTONIX) 40 MG EC tablet Take 1 tablet by mouth twice a day. 180 tablet 4 12/8/2021 at Unknown time   • prazosin (MINIPRESS) 2 MG capsule Take 1 capsule by mouth every night at bedtime. 30 capsule 4 12/8/2021 at Unknown time   • sodium hypochlorite (DAKIN'S) 0.25 % topical solution Apply to wound twice a day as directed 473 mL 3 12/8/2021 at Unknown time   • aspirin 81 MG EC tablet Take 81 mg by mouth Daily.   12/8/2021   • Dulaglutide (Trulicity) 1.5 MG/0.5ML solution pen-injector Inject 1.5 mg under the skin into the appropriate area as directed 1 (One) Time Per Week. 6 mL 11 12/6/2021   • ondansetron ODT (ZOFRAN-ODT) 4 MG disintegrating tablet Place 1 tablet on tongue three times a day as needed. 15 tablet 1 More than a month at Unknown time   • rosuvastatin (CRESTOR) 40 MG tablet Take 1 tablet by mouth Every Night. 90 tablet 0 12/7/2021   • tamsulosin (FLOMAX) 0.4 MG capsule 24 hr capsule Take 1 capsule by mouth Daily.   12/7/2021   • traZODone (DESYREL) 100 MG tablet Take 1 tablet by mouth Every Night. (Patient taking differently: Take 150 mg by mouth Every Night.) 90 tablet 2 12/6/2021       Medicines:  Current Facility-Administered Medications   Medication Dose Route Frequency Provider Last Rate Last Admin   • acetaminophen (TYLENOL) tablet 650 mg  650 mg Oral Q4H PRN Garrett Alicia MD   650 mg at 12/10/21 0850   • aspirin EC tablet 81 mg  81 mg Oral Daily Garrett Alicia MD   81 mg at 12/11/21 0823   • bumetanide (BUMEX) tablet 2 mg  2 mg Oral BID Stewart Alvarez APRN   2 mg at 12/11/21 0823   • busPIRone (BUSPAR) tablet 10 mg  10 mg Oral TID Garrett Alicia MD   10 mg at 12/11/21 0823   • carvedilol (COREG) tablet 6.25 mg  6.25 mg Oral BID Ravin  Garrett Cabrera MD   6.25 mg at 12/11/21 0823   • cyclobenzaprine (FLEXERIL) tablet 5 mg  5 mg Oral BID PRN Garrett Alicia MD   5 mg at 12/10/21 0850   • dextrose (D50W) (25 g/50 mL) IV injection 25 g  25 g Intravenous Q15 Min PRN Garrett Alicia MD       • dextrose (GLUTOSE) oral gel 15 g  15 g Oral Q15 Min PRN Garrett Alicia MD       • donepezil (ARICEPT) tablet 5 mg  5 mg Oral Daily Garrett Alicia MD   5 mg at 12/11/21 0823   • DULoxetine (CYMBALTA) DR capsule 60 mg  60 mg Oral Daily Garrett Alicia MD   60 mg at 12/11/21 0823   • enoxaparin (LOVENOX) syringe 40 mg  40 mg Subcutaneous Q12H Garrett Alicia MD   40 mg at 12/11/21 0821   • gabapentin (NEURONTIN) capsule 600 mg  600 mg Oral TID Garrett Alicia MD   600 mg at 12/11/21 1356   • glucagon (human recombinant) (GLUCAGEN DIAGNOSTIC) injection 1 mg  1 mg Subcutaneous Q15 Min PRN Garrett Alicia MD       • HYDROcodone-acetaminophen (NORCO) 7.5-325 MG per tablet 1 tablet  1 tablet Oral BID PRN Payam Keyes DO   1 tablet at 12/11/21 0147   • influenza vac split quad (FLUZONE,FLUARIX,AFLURIA,FLULAVAL) injection 0.5 mL  0.5 mL Intramuscular During Hospitalization Garrett Alicia MD       • insulin detemir (LEVEMIR) injection 50 Units  50 Units Subcutaneous BID Garrett Alicia MD   50 Units at 12/11/21 0822   • insulin lispro (humaLOG) injection 0-9 Units  0-9 Units Subcutaneous 4x Daily With Meals & Nightly Garrett Alicia MD   6 Units at 12/11/21 1143   • ipratropium-albuterol (DUO-NEB) nebulizer solution 3 mL  3 mL Nebulization Q6H - RT Payam Keyes DO   3 mL at 12/11/21 1421   • pantoprazole (PROTONIX) EC tablet 40 mg  40 mg Oral Q AM Garrett Alicia MD   40 mg at 12/11/21 0646   • rosuvastatin (CRESTOR) tablet 40 mg  40 mg Oral Nightly Garrett Alicia MD   40 mg at 12/10/21 4126   • sodium chloride 0.9 % flush 10 mL  10 mL  Intravenous PRN Garrett Alicia MD       • sodium chloride 0.9 % flush 10 mL  10 mL Intravenous PRN Garrett Alicia MD       • sodium chloride 0.9 % flush 10 mL  10 mL Intravenous Q12H Garrett Alicia MD   10 mL at 12/11/21 0822   • sodium chloride 0.9 % flush 10 mL  10 mL Intravenous PRN Garrett Alicia MD       • sodium hypochlorite (DAKIN'S 1/4 STRENGTH) 0.125 % topical solution 0.125% solution   Topical Q12H Dunia Wolfe APRN   Given at 12/11/21 0824   • tamsulosin (FLOMAX) 24 hr capsule 0.4 mg  0.4 mg Oral Daily Garrett Alicia MD   0.4 mg at 12/11/21 0823   • traZODone (DESYREL) tablet 100 mg  100 mg Oral Nightly Garrett Alicia MD   100 mg at 12/10/21 2153       Past Medical History:  Past Medical History:   Diagnosis Date   • Arthritis    • Autonomic disease    • CAD (coronary artery disease) 2/6/2017   • Cervical radiculopathy 9/16/2021   • Chronic constipation with acute exaccerbation 5/10/2021   • Coronary artery disease    • Degeneration of cervical intervertebral disc 8/11/2021   • Diabetes mellitus (HCC)    • Diabetic foot ulcer (HCC) 8/31/2020   • Diabetic polyneuropathy associated with type 2 diabetes mellitus (HCC) 1/18/2021   • Gastroesophageal reflux disease 5/13/2019   • HTN (hypertension), benign 5/3/2017   • Hyperlipidemia    • Hypertension    • Mixed hyperlipidemia 2/7/2017   • Multiple lung nodules 1/26/2020    5mm, 9 mm RLL identified 1/2020, not present 10/2019.   • Myocardial infarction (HCC)    • Osteomyelitis (HCC) 1/22/2020   • Osteomyelitis of fifth toe of right foot (HCC) 10/7/2019   • Pancreatitis    • Persistent insomnia 1/20/2020   • Renal disorder    • Sleep apnea 2/6/2017   • Sleep apnea with use of continuous positive airway pressure (CPAP)    • Slow transit constipation 1/16/2019   • Spinal stenosis in cervical region 9/16/2021   • Vitamin D deficiency 3/2/2021       Past Surgical History:  Past Surgical History:    Procedure Laterality Date   • ABDOMINAL SURGERY     • APPENDECTOMY     • BACK SURGERY     • CARDIAC CATHETERIZATION Left 2021    Procedure: Left Heart Cath w poss intervention left anatomical snuff box acess;  Surgeon: Omkar Charles DO;  Location:  [facility];  Service: Cardiology;  Laterality: Left;   • CARDIAC SURGERY     • CATARACT EXTRACTION     • CERVICAL SPINE SURGERY     • COLONOSCOPY N/A 2017    Normal exam repeat in 5 years   • COLONOSCOPY N/A 2019    Mild acute inflammation   • COLONOSCOPY W/ POLYPECTOMY  2014    Hyperplastic polyp   • CORONARY ARTERY BYPASS GRAFT  10/2015   • ENDOSCOPY  2011    Gastritis with hemorrhage   • ENDOSCOPY N/A 2017    Normal exam   • ENDOSCOPY N/A 2019    Gastritis   • ENDOSCOPY N/A 2020    Non-erosive gastritis with hemorrhage   • ENDOSCOPY N/A 2/10/2021    Esophagitis   • INCISION AND DRAINAGE OF WOUND Left 2007    spider bite       Family History  Family History   Problem Relation Age of Onset   • Colon cancer Father    • Heart disease Father    • Colon cancer Sister    • Colon polyps Sister    • Alzheimer's disease Mother    • Coronary artery disease Sister    • Coronary artery disease Sister        Social History  Social History     Socioeconomic History   • Marital status:    Tobacco Use   • Smoking status: Former Smoker     Quit date:      Years since quittin.9   • Smokeless tobacco: Never Used   • Tobacco comment: smoked in highschool   Substance and Sexual Activity   • Alcohol use: No   • Drug use: No   • Sexual activity: Defer         Review of Systems:  History obtained from chart review and the patient  General ROS: No fever or chills  Respiratory ROS: No cough, shortness of breath, wheezing  Cardiovascular ROS: No chest pain or palpitations  Gastrointestinal ROS: No abdominal pain or melena  Genito-Urinary ROS: No dysuria or hematuria  Psych ROS: No anxiety and  "depression    Objective:  Patient Vitals for the past 24 hrs:   BP Temp Temp src Pulse Resp SpO2 Height   12/11/21 1425 -- -- -- 77 -- -- --   12/11/21 1421 -- -- -- 77 16 93 % --   12/11/21 1100 155/69 98.1 °F (36.7 °C) Oral 73 16 95 % --   12/11/21 0700 136/66 98.5 °F (36.9 °C) Oral 92 16 95 % --   12/11/21 0637 -- -- -- 88 -- -- --   12/11/21 0633 -- -- -- 85 16 94 % --   12/11/21 0449 122/51 98 °F (36.7 °C) Oral 88 16 93 % --   12/11/21 0004 -- -- -- 70 18 96 % --   12/10/21 2359 131/59 97.5 °F (36.4 °C) Oral 76 18 98 % --   12/10/21 2357 -- -- -- 68 18 98 % --   12/10/21 1958 135/61 97.8 °F (36.6 °C) Oral 66 16 95 % 182.9 cm (72\")   12/10/21 1908 -- -- -- 70 16 93 % --   12/10/21 1901 -- -- -- 67 16 94 % --       Intake/Output Summary (Last 24 hours) at 12/11/2021 1558  Last data filed at 12/11/2021 1451  Gross per 24 hour   Intake 480 ml   Output 5925 ml   Net -5445 ml     General: awake/alert   Chest:  clear to auscultation bilaterally without respiratory distress  CVS: regular rate and rhythm  Abdominal: soft, nontender, positive bowel sounds  Extremities: ble edema  Skin: warm and dry without rash      Labs:  Results from last 7 days   Lab Units 12/11/21  0332 12/10/21  0453 12/09/21  0519   WBC 10*3/mm3 7.92 6.25 7.32   HEMOGLOBIN g/dL 10.5* 9.9* 9.1*   HEMATOCRIT % 33.4* 31.4* 30.0*   PLATELETS 10*3/mm3 238 227 207         Results from last 7 days   Lab Units 12/11/21 0332 12/10/21  0453 12/09/21  0519 12/08/21 2041 12/08/21 2041   SODIUM mmol/L 138 139 138   < > 137   POTASSIUM mmol/L 4.0 4.0 3.8   < > 4.1   CHLORIDE mmol/L 92* 95* 99   < > 98   CO2 mmol/L 32.0* 35.0* 29.0   < > 27.0   BUN mg/dL 44* 44* 37*   < > 36*   CREATININE mg/dL 2.17* 2.23* 2.01*   < > 2.09*   CALCIUM mg/dL 9.0 8.8 8.6   < > 8.6   BILIRUBIN mg/dL  --   --   --   --  0.4   ALK PHOS U/L  --   --   --   --  109   ALT (SGPT) U/L  --   --   --   --  11   AST (SGOT) U/L  --   --   --   --  15   GLUCOSE mg/dL 284* 213* 127*   < > 276* "    < > = values in this interval not displayed.       Radiology:   Imaging Results (Last 72 Hours)     Procedure Component Value Units Date/Time    XR Chest 1 View [[id number]] Collected: 12/08/21 2056     Updated: 12/08/21 2101    Narrative:      EXAMINATION: XR CHEST 1 VW- 12/8/2021 8:56 PM CST     HISTORY: SOB.     REPORT: Frontal view the chest was obtained.     COMPARISON: Chest x-ray 11/7/2021.     The lungs are grossly hypoaerated, there are central basilar vascular  crowding, with borderline cardiomegaly. Scattered calcified granulomas  are present. There is no lung consolidation. Previous CABG is evident.  No pneumothorax or pleural effusion is identified. There is previous  ACDF.       Impression:      Gross hypoaeration of the lungs with central and basilar  vascular crowding, no overt evidence of CHF or pneumonia.  This report was finalized on 12/08/2021 20:58 by Dr. Percy Cesar MD.          Culture:  No results found for: BLOODCX, URINECX, WOUNDCX, MRSACX, RESPCX, STOOLCX      Assessment   Acute kidney injury  Chronic kidney disease stage 3b  Anasarca/volume overload  Type 2 diabetes  Hypertension  CAD with prior CABG  Obstructive sleep apnea - unable to tolerate home CPAP  Morbid Obesity   Wound at site of previous 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     Plan:  Continue oral Bumex  Patient to contact office Monday for appointment for close outpatient follow-up  Diet/fluid/med education        Willy Arteaga MD  12/11/2021  15:58 CST

## 2021-12-12 NOTE — OUTREACH NOTE
Prep Survey      Responses   [facility] patient discharged from? [place]   Is LACE score < 7 ? No   Emergency Room discharge w/ pulse ox? No   Eligibility Readm Mgmt   Discharge diagnosis Anasarca, WANDER, surgical wound of amputation of left fifth toe    Does the patient have one of the following disease processes/diagnoses(primary or secondary)? Other   Does the patient have Home health ordered? Yes   What is the Home health agency?  [facility]   Is there a DME ordered? No   Prep survey completed? Yes          Lesley Figueroa RN

## 2021-12-13 ENCOUNTER — TRANSCRIBE ORDERS (OUTPATIENT)
Dept: HOME HEALTH SERVICES | Facility: HOME HEALTHCARE | Age: 66
End: 2021-12-13

## 2021-12-13 ENCOUNTER — APPOINTMENT (OUTPATIENT)
Dept: PREADMISSION TESTING | Facility: HOSPITAL | Age: 66
End: 2021-12-13

## 2021-12-13 DIAGNOSIS — R60.1 ANASARCA: Primary | ICD-10-CM

## 2021-12-13 NOTE — PAYOR COMM NOTE
"SD HOME 12-11-21    [id number]      Erick Luong (66 y.o. Male)             Date of Birth Social Security Number Address Home Phone MRN    [id number]  [id number]  [place] [address] [phone] [id number]    [employer]ri[employer] Marital Status             Congregational        Admission Date Admission Type Admitting Provider Attending Provider Department, Room/Bed    12/8/21 Emergency Payam Keyes DO  [facility] 4B, 402/1    Discharge Date Discharge Disposition Discharge Destination          12/11/2021 Home or Self Care Home             Attending Provider: (none)   Allergies: Cefepime, Bactrim [Sulfamethoxazole-trimethoprim], Clindamycin, Vancomycin, Metronidazole    Isolation: None   Infection: MRSA (05/19/19)   Code Status: Prior   Advance Care Planning Activity    Ht: 182.9 cm (72\")   Wt: 150 kg (331 lb 1.6 oz)    Admission Cmt: None   Principal Problem: Anasarca [R60.1]                 Active Insurance as of 12/8/2021     Primary Coverage     Payor Plan Insurance Group Employer/Plan Group    [facility] BLUE CROSS [employer] EMPLOYEE [id number]     Payor Plan Address Payor Plan Phone Number Payor Plan Fax Number Effective Dates    [address] [phone]  1/1/2018 - None Entered    [facility] [id number]       Subscriber Name Subscriber Birth Date Member ID       ZAINAB LUONG 11/27/1969 [id number]           Secondary Coverage     Payor Plan Insurance Group Employer/Plan Group    MEDICARE MEDICARE A & B      Payor Plan Address Payor Plan Phone Number Payor Plan Fax Number Effective Dates    [address] [phone]  7/1/2013 - None Entered    [facility] [id number]       Subscriber Name Subscriber Birth Date Member ID       ERICK LUONG 1955 [id number]                 Emergency Contacts      (Rel.) Home Phone Work Phone Mobile Phone    Zainab Luong (Spouse) [phone]               Discharge Summary      Payam Keyes DO at " 12/11/21 1507              [facility] Medicine Services  DISCHARGE SUMMARY       Date of Admission: 12/8/2021  Date of Discharge:  12/11/2021  Primary Care Physician: Del Shetty MD    Discharge Diagnoses:  Active Hospital Problems    Diagnosis    • **Anasarca    • Diabetic polyneuropathy associated with type 2 diabetes mellitus (HCC)    • Medically noncompliant    • Diabetic foot ulcer (HCC)    • CKD (chronic kidney disease) stage 4, GFR 15-29 ml/min ([facility])    • Sleep apnea with use of continuous positive airway pressure (CPAP)    • Class 3 severe obesity due to excess calories with body mass index (BMI) of 45.0 to 49.9 in adult ([facility])    • Anemia due to chronic kidney disease    • Essential hypertension    • Type 2 diabetes mellitus with hyperglycemia, with long-term current use of insulin ([facility])          Presenting Problem/History of Present Illness:  Anasarca [R60.1]     Chief Complaint on Day of Discharge:   Generalized swelling    History of Present Illness on Day of Discharge:   The patient's generalized swelling has resolved significantly over the past few days.  He has diuresed over 16,000 cc since admission and is feeling much better.  His creatinine has stabilized at 2.17 which is slightly higher than his typical baseline.  He has no complaints of shortness of breath and is able to ambulate without difficulty.  He continues with a 1500 cc fluid restriction.  He will be discharged with a prescription for Bumex and other medications as needed.    Hospital Course   66 year old male with PMH of CAD, DM ID, HTN, CKD, BRITTNI, that presents to the ER with complaints of leg edema and fluid retention. He has tried Bumex or and Metolazone recently added but continues to have worsening edema and weight wain. Tonight he felt short of breath with activity and pulse ox at home was read as low 80%, he did not present hypoxemic to to the ER or distressed.   Plan:   Admit to medical floor  observation  Vitals every 4 hours  Cardiac, diabetic, renal diet with fluid restriction  IVF saline lock  Routine labs  Bumex drip diuresis  Nephrology consult  Local wound care for amputation site  Humalog sliding scale  Baseline insulin Levemir 50 Unit BID     The patient was also placed on a 1500 cc fluid restriction.  Creatinine was elevated in the emergency department at 2.09.  Nephrology consulted on the patient evaluated and made recommendations.  The patient was placed on a Bumex drip at 1 mg/h and diuresed freely during his hospital stay.  After he had diuresed 4000 cc, he was transitioned to oral Bumex 2 mg p.o. twice daily and had an additional 4000 cc of diuresis.  He is doing well today with no dyspnea and significantly decreased anasarca.  Renal function is stable with creatinine 2.17.  This is slightly higher than his baseline but is explainable on the basis of aggressive diuresis.  Is doing well today and is stable for discharge home.    Consults:   Nephrology:  Assessment   1.  Acute kidney injury  2.  Baseline chronic kidney disease stage 3b  3.  Anasarca/volume overload  4.  Type 2 diabetes  5.  Hypertension  6.  CAD with prior CABG  7.  Obstructive sleep apnea  8.  Obesity   9.  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  Continue Bumex drip, good response so far  2.  Continue fluid restriction  3.  Monitor labs  4.  Further assessment and plan pending Dr Arteaga's evaluation of patient        Thank you for the consult, we appreciate the opportunity to provide care to your patients.  Feel free to contact me if I can be of any further assistance.       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     Plan:  Continue Bumex drip  with close lab monitoring  Workup ordered and ongoing  Diet/fluid/med education        Willy Arteaga MD    Pertinent Test Results:   Lab Results (last 7 days)     Procedure Component Value Units Date/Time    POC Glucose Once [[id number]]  (Abnormal) Collected: 12/11/21 1109    Specimen: Blood Updated: 12/11/21 1132     Glucose 266 mg/dL      Comment: : [id number] Justo LadonnaMeter ID: [id number]       POC Glucose Once [[id number]]  (Abnormal) Collected: 12/11/21 0739    Specimen: Blood Updated: 12/11/21 0815     Glucose 218 mg/dL      Comment: : [id number] Kemp LadonnaMeter ID: [id number]       Basic Metabolic Panel [[id number]]  (Abnormal) Collected: 12/11/21 0332    Specimen: Blood Updated: 12/11/21 0444     Glucose 284 mg/dL      BUN 44 mg/dL      Creatinine 2.17 mg/dL      Sodium 138 mmol/L      Potassium 4.0 mmol/L      Chloride 92 mmol/L      CO2 32.0 mmol/L      Calcium 9.0 mg/dL      eGFR Non African Amer 31 mL/min/1.73      BUN/Creatinine Ratio 20.3     Anion Gap 14.0 mmol/L     Narrative:      GFR Normal >60  Chronic Kidney Disease <60  Kidney Failure <15      CBC & Differential [[id number]]  (Abnormal) Collected: 12/11/21 0332    Specimen: Blood Updated: 12/11/21 0429    Narrative:      The following orders were created for panel order CBC & Differential.  Procedure                               Abnormality         Status                     ---------                               -----------         ------                     CBC Auto Differential[[id number]]        Abnormal            Final result                 Please view results for these tests on the individual orders.    CBC Auto Differential [[id number]]  (Abnormal) Collected: 12/11/21 0332    Specimen: Blood Updated: 12/11/21 0429     WBC 7.92 10*3/mm3      RBC 3.91 10*6/mm3      Hemoglobin 10.5 g/dL      Hematocrit 33.4 %      MCV 85.4 fL      MCH 26.9 pg      MCHC 31.4 g/dL      RDW 13.6 %      RDW-SD 42.2 fl      MPV 11.4  fL      Platelets 238 10*3/mm3      Neutrophil % 61.7 %      Lymphocyte % 20.3 %      Monocyte % 11.0 %      Eosinophil % 5.9 %      Basophil % 0.5 %      Immature Grans % 0.6 %      Neutrophils, Absolute 4.88 10*3/mm3      Lymphocytes, Absolute 1.61 10*3/mm3      Monocytes, Absolute 0.87 10*3/mm3      Eosinophils, Absolute 0.47 10*3/mm3      Basophils, Absolute 0.04 10*3/mm3      Immature Grans, Absolute 0.05 10*3/mm3      nRBC 0.0 /100 WBC     POC Glucose Once [[id number]]  (Abnormal) Collected: 12/10/21 2005    Specimen: Blood Updated: 12/10/21 2018     Glucose 258 mg/dL      Comment: : [id number] Blakemore DominiqueMeter ID: [id number]       POC Glucose Once [[id number]]  (Abnormal) Collected: 12/10/21 1623    Specimen: Blood Updated: 12/10/21 1636     Glucose 333 mg/dL      Comment: : [id number] Saran NixonaisaMeter ID: [id number]       POC Glucose Once [[id number]]  (Abnormal) Collected: 12/10/21 1056    Specimen: Blood Updated: 12/10/21 1112     Glucose 256 mg/dL      Comment: : [id number] Toreye CieraMeter ID: [id number]       POC Glucose Once [[id number]]  (Abnormal) Collected: 12/09/21 1605    Specimen: Blood Updated: 12/10/21 0808     Glucose 339 mg/dL      Comment: : [id number] Heydi WalkerineMeter ID: [id number]       POC Glucose Once [[id number]]  (Abnormal) Collected: 12/10/21 0729    Specimen: Blood Updated: 12/10/21 0740     Glucose 171 mg/dL      Comment: : [id number] Kellykle CieraMeter ID: [id number]       Basic Metabolic Panel [[id number]]  (Abnormal) Collected: 12/10/21 0453    Specimen: Blood Updated: 12/10/21 0600     Glucose 213 mg/dL      BUN 44 mg/dL      Creatinine 2.23 mg/dL      Sodium 139 mmol/L      Potassium 4.0 mmol/L      Chloride 95 mmol/L      CO2 35.0 mmol/L      Calcium 8.8 mg/dL      eGFR Non African Amer 30 mL/min/1.73      BUN/Creatinine Ratio 19.7     Anion Gap 9.0 mmol/L     Narrative:      GFR Normal >60  Chronic Kidney Disease <60  Kidney Failure <15      CBC  & Differential [[id number]]  (Abnormal) Collected: 12/10/21 0453    Specimen: Blood Updated: 12/10/21 0544    Narrative:      The following orders were created for panel order CBC & Differential.  Procedure                               Abnormality         Status                     ---------                               -----------         ------                     CBC Auto Differential[[id number]]        Abnormal            Final result                 Please view results for these tests on the individual orders.    CBC Auto Differential [[id number]]  (Abnormal) Collected: 12/10/21 0453    Specimen: Blood Updated: 12/10/21 0544     WBC 6.25 10*3/mm3      RBC 3.54 10*6/mm3      Hemoglobin 9.9 g/dL      Hematocrit 31.4 %      MCV 88.7 fL      MCH 28.0 pg      MCHC 31.5 g/dL      RDW 13.6 %      RDW-SD 44.4 fl      MPV 11.5 fL      Platelets 227 10*3/mm3      Neutrophil % 54.2 %      Lymphocyte % 25.9 %      Monocyte % 12.3 %      Eosinophil % 6.7 %      Basophil % 0.6 %      Immature Grans % 0.3 %      Neutrophils, Absolute 3.38 10*3/mm3      Lymphocytes, Absolute 1.62 10*3/mm3      Monocytes, Absolute 0.77 10*3/mm3      Eosinophils, Absolute 0.42 10*3/mm3      Basophils, Absolute 0.04 10*3/mm3      Immature Grans, Absolute 0.02 10*3/mm3      nRBC 0.0 /100 WBC     POC Glucose Once [[id number]]  (Abnormal) Collected: 12/09/21 2047    Specimen: Blood Updated: 12/09/21 2109     Glucose 351 mg/dL      Comment: : [id number] Blakemore DominiqueMeter ID: [id number]       POC Glucose Once [[id number]]  (Abnormal) Collected: 12/09/21 1057    Specimen: Blood Updated: 12/09/21 1128     Glucose 206 mg/dL      Comment: : [id number] Justo WattHCS Control SystemsaMeter ID: [id number]       POC Glucose Once [[id number]]  (Normal) Collected: 12/09/21 0726    Specimen: Blood Updated: 12/09/21 0806     Glucose 130 mg/dL      Comment: : [id number] Justo WattHCS Control SystemsaMeter ID: [id number]       Basic Metabolic Panel [[id number]]  (Abnormal)  Collected: 12/09/21 0519    Specimen: Blood Updated: 12/09/21 0613     Glucose 127 mg/dL      BUN 37 mg/dL      Creatinine 2.01 mg/dL      Sodium 138 mmol/L      Potassium 3.8 mmol/L      Chloride 99 mmol/L      CO2 29.0 mmol/L      Calcium 8.6 mg/dL      eGFR Non African Amer 34 mL/min/1.73      BUN/Creatinine Ratio 18.4     Anion Gap 10.0 mmol/L     Narrative:      GFR Normal >60  Chronic Kidney Disease <60  Kidney Failure <15      CBC & Differential [[id number]]  (Abnormal) Collected: 12/09/21 0519    Specimen: Blood Updated: 12/09/21 0544    Narrative:      The following orders were created for panel order CBC & Differential.  Procedure                               Abnormality         Status                     ---------                               -----------         ------                     CBC Auto Differential[[id number]]        Abnormal            Final result                 Please view results for these tests on the individual orders.    CBC Auto Differential [[id number]]  (Abnormal) Collected: 12/09/21 0519    Specimen: Blood Updated: 12/09/21 0544     WBC 7.32 10*3/mm3      RBC 3.35 10*6/mm3      Hemoglobin 9.1 g/dL      Hematocrit 30.0 %      MCV 89.6 fL      MCH 27.2 pg      MCHC 30.3 g/dL      RDW 13.9 %      RDW-SD 45.1 fl      MPV 11.2 fL      Platelets 207 10*3/mm3      Neutrophil % 71.1 %      Lymphocyte % 14.9 %      Monocyte % 10.0 %      Eosinophil % 3.0 %      Basophil % 0.3 %      Immature Grans % 0.7 %      Neutrophils, Absolute 5.21 10*3/mm3      Lymphocytes, Absolute 1.09 10*3/mm3      Monocytes, Absolute 0.73 10*3/mm3      Eosinophils, Absolute 0.22 10*3/mm3      Basophils, Absolute 0.02 10*3/mm3      Immature Grans, Absolute 0.05 10*3/mm3      nRBC 0.0 /100 WBC     POC Glucose Once [[id number]]  (Normal) Collected: 12/09/21 0035    Specimen: Blood Updated: 12/09/21 0047     Glucose 78 mg/dL      Comment: : [id number] Cymromayra IzquierdoMeter ID: [id number]       [place] Draw [[id number]]  Collected: 12/08/21 2041    Specimen: Blood Updated: 12/09/21 0045    Narrative:      The following orders were created for panel order [place] Draw.  Procedure                               Abnormality         Status                     ---------                               -----------         ------                     Green Top (Gel)[[id number]]                                  Final result               Lavender Top[[id number]]                                     Final result               Red Top[[id number]]                                          Final result               Gray Top[[id number]]                                         Final result               Light Blue Top[[id number]]                                   Final result                 Please view results for these tests on the individual orders.    North Top [[id number]] Collected: 12/08/21 2041    Specimen: Blood Updated: 12/09/21 0045     Extra Tube Hold for add-ons.     Comment: Auto resulted.       COVID PRE-OP / PRE-PROCEDURE SCREENING ORDER (NO ISOLATION) - Swab, Nasal Cavity [[id number]]  (Normal) Collected: 12/08/21 2317    Specimen: Swab from Nasal Cavity Updated: 12/09/21 0009    Narrative:      The following orders were created for panel order COVID PRE-OP / PRE-PROCEDURE SCREENING ORDER (NO ISOLATION) - Swab, Nasal Cavity.  Procedure                               Abnormality         Status                     ---------                               -----------         ------                     COVID-19,[facility] IN-[facility]...[[id number]]  Normal              Final result                 Please view results for these tests on the individual orders.    COVID-19,[facility] IN-HOUSE,Nasal Swab No Transport Media 3-4 HR TAT - Swab, Nasal Cavity [[id number]]  (Normal) Collected: 12/08/21 2317    Specimen: Swab from Nasal Cavity Updated: 12/09/21 0009     COVID19 Not Detected    Narrative:      Fact sheet for providers:  https://www.fda.gov/media/967291/download     Fact sheet for patients: https://www.fda.gov/media/513875/download    Test performed by PCR.    Consider negative results in combination with clinical observations, patient history, and epidemiological information.    Green Top (Gel) [[id number]] Collected: 12/08/21 2041    Specimen: Blood Updated: 12/08/21 2145     Extra Tube Hold for add-ons.     Comment: Auto resulted.       Lavender Top [[id number]] Collected: 12/08/21 2041    Specimen: Blood Updated: 12/08/21 2145     Extra Tube hold for add-on     Comment: Auto resulted       Red Top [[id number]] Collected: 12/08/21 2041    Specimen: Blood Updated: 12/08/21 2145     Extra Tube Hold for add-ons.     Comment: Auto resulted.       Light Blue Top [[id number]] Collected: 12/08/21 2041    Specimen: Blood Updated: 12/08/21 2145     Extra Tube hold for add-on     Comment: Auto resulted       Urinalysis With Microscopic If Indicated (No Culture) - Urine, Clean Catch [[id number]]  (Abnormal) Collected: 12/08/21 2112    Specimen: Urine, Clean Catch Updated: 12/08/21 2139     Color, UA Yellow     Appearance, UA Clear     pH, UA 6.5     Specific Gravity, UA 1.019     Glucose, UA >=1000 mg/dL (3+)     Ketones, UA Negative     Bilirubin, UA Negative     Blood, UA Negative     Protein, UA 30 mg/dL (1+)     Leuk Esterase, UA Negative     Nitrite, UA Negative     Urobilinogen, UA 1.0 E.U./dL    Urinalysis, Microscopic Only - Urine, Clean Catch [[id number]] Collected: 12/08/21 2112    Specimen: Urine, Clean Catch Updated: 12/08/21 2139     RBC, UA None Seen /HPF      WBC, UA None Seen /HPF      Bacteria, UA None Seen /HPF      Squamous Epithelial Cells, UA 0-2 /HPF      Methodology Manual Light Microscopy    Comprehensive Metabolic Panel [[id number]]  (Abnormal) Collected: 12/08/21 2041    Specimen: Blood Updated: 12/08/21 2118     Glucose 276 mg/dL      BUN 36 mg/dL      Creatinine 2.09 mg/dL      Sodium 137 mmol/L      Potassium 4.1  mmol/L      Chloride 98 mmol/L      CO2 27.0 mmol/L      Calcium 8.6 mg/dL      Total Protein 6.7 g/dL      Albumin 3.70 g/dL      ALT (SGPT) 11 U/L      AST (SGOT) 15 U/L      Alkaline Phosphatase 109 U/L      Total Bilirubin 0.4 mg/dL      eGFR Non African Amer 32 mL/min/1.73      Globulin 3.0 gm/dL      A/G Ratio 1.2 g/dL      BUN/Creatinine Ratio 17.2     Anion Gap 12.0 mmol/L     Narrative:      GFR Normal >60  Chronic Kidney Disease <60  Kidney Failure <15      Troponin [[id number]]  (Normal) Collected: 12/08/21 2041    Specimen: Blood Updated: 12/08/21 2114     Troponin T <0.010 ng/mL     Narrative:      Troponin T Reference Range:  <= 0.03 ng/mL-   Negative for AMI  >0.03 ng/mL-     Abnormal for myocardial necrosis.  Clinicians would have to utilize clinical acumen, EKG, Troponin and serial changes to determine if it is an Acute Myocardial Infarction or myocardial injury due to an underlying chronic condition.       Results may be falsely decreased if patient taking Biotin.      BNP [[id number]]  (Abnormal) Collected: 12/08/21 2041    Specimen: Blood Updated: 12/08/21 2114     proBNP 1,308.0 pg/mL     Narrative:      Among patients with dyspnea, NT-proBNP is highly sensitive for the detection of acute congestive heart failure. In addition NT-proBNP of <300 pg/ml effectively rules out acute congestive heart failure with 99% negative predictive value.    Results may be falsely decreased if patient taking Biotin.      Magnesium [[id number]]  (Normal) Collected: 12/08/21 2041    Specimen: Blood Updated: 12/08/21 2112     Magnesium 2.2 mg/dL     CBC & Differential [[id number]]  (Abnormal) Collected: 12/08/21 2041    Specimen: Blood Updated: 12/08/21 2056    Narrative:      The following orders were created for panel order CBC & Differential.  Procedure                               Abnormality         Status                     ---------                               -----------         ------                     CBC  Auto Differential[[id number]]        Abnormal            Final result                 Please view results for these tests on the individual orders.    CBC Auto Differential [[id number]]  (Abnormal) Collected: 12/08/21 2041    Specimen: Blood Updated: 12/08/21 2056     WBC 6.99 10*3/mm3      RBC 3.32 10*6/mm3      Hemoglobin 9.1 g/dL      Hematocrit 29.7 %      MCV 89.5 fL      MCH 27.4 pg      MCHC 30.6 g/dL      RDW 13.8 %      RDW-SD 45.0 fl      MPV 10.6 fL      Platelets 206 10*3/mm3      Neutrophil % 65.9 %      Lymphocyte % 17.9 %      Monocyte % 10.3 %      Eosinophil % 4.9 %      Basophil % 0.4 %      Immature Grans % 0.6 %      Neutrophils, Absolute 4.61 10*3/mm3      Lymphocytes, Absolute 1.25 10*3/mm3      Monocytes, Absolute 0.72 10*3/mm3      Eosinophils, Absolute 0.34 10*3/mm3      Basophils, Absolute 0.03 10*3/mm3      Immature Grans, Absolute 0.04 10*3/mm3      nRBC 0.0 /100 WBC         Imaging Results (Last 7 Days)     Procedure Component Value Units Date/Time    XR Chest 1 View [[id number]] Collected: 12/08/21 2056     Updated: 12/08/21 2101    Narrative:      EXAMINATION: XR CHEST 1 VW- 12/8/2021 8:56 PM CST     HISTORY: SOB.     REPORT: Frontal view the chest was obtained.     COMPARISON: Chest x-ray 11/7/2021.     The lungs are grossly hypoaerated, there are central basilar vascular  crowding, with borderline cardiomegaly. Scattered calcified granulomas  are present. There is no lung consolidation. Previous CABG is evident.  No pneumothorax or pleural effusion is identified. There is previous  ACDF.       Impression:      Gross hypoaeration of the lungs with central and basilar  vascular crowding, no overt evidence of CHF or pneumonia.  This report was finalized on 12/08/2021 20:58 by Dr. Percy Cesar MD.          Condition on Discharge:    Stable    Physical Exam on Discharge:  /69 (BP Location: Left arm, Patient Position: Sitting)   Pulse 77   Temp 98.1 °F (36.7 °C) (Oral)   Resp 16   " Ht 182.9 cm (72\")   Wt (!) 150 kg (331 lb 1.6 oz) Comment: bed  SpO2 93%   BMI 44.91 kg/m²   Physical Exam     Constitutional:       General: He is not in acute distress.     Appearance: He is morbidly obese. He is ill-appearing. He is not toxic-appearing.      Comments: Sitting in a chair at bedside. No acute distress.    On room air with good saturations.   HENT:      Head: Normocephalic and atraumatic.      Mouth: Mucous membranes are moist.      Pharynx: Oropharynx is clear.   Eyes:      Conjunctiva/sclera: Conjunctivae normal.   Cardiovascular:      Rate and Rhythm: Normal rate and regular rhythm.      Pulses: Normal pulses.      Heart sounds: No murmur heard.  Pulmonary:      Effort: Pulmonary effort is normal. No respiratory distress.      Breath sounds: Normal breath sounds. No wheezing.   Abdominal:      General: Bowel sounds are normal.  Abdomen is protuberant.     Palpations: Abdomen is soft.      Tenderness: There is no abdominal tenderness.   Musculoskeletal:         General: No tenderness. Normal range of motion.      Cervical back: Normal range of motion and neck supple. No muscular tenderness.      Right lower leg: Edema  resolved.      Left lower leg: Edema  resolved.   Skin:     General: Skin is warm and dry.      Findings: No erythema or rash.   Neurological:      General: No focal deficit present.      Mental Status: He is alert and oriented to person, place, and time.   Psychiatric:         Mood and Affect: Mood normal.         Behavior: Behavior normal.     Discharge Disposition:  Home or Self Care    Discharge Medications:     Discharge Medications      Changes to Medications      Instructions Start Date   bumetanide 2 MG tablet  Commonly known as: BUMEX  What changed:   · medication strength  · how much to take   2 mg, Oral, 2 Times Daily      traZODone 100 MG tablet  Commonly known as: DESYREL  What changed: how much to take   100 mg, Oral, Nightly         Continue These Medications     "  Instructions Start Date   aspirin 81 MG EC tablet   81 mg, Oral, Daily      busPIRone 10 MG tablet  Commonly known as: BUSPAR   Take 1 tablet 3 times a day by oral route as needed.      carvedilol 6.25 MG tablet  Commonly known as: COREG   6.25 mg, Oral, 2 Times Daily      colchicine 0.6 MG tablet   Take 1 tablet by mouth 2 (two) times a day.      cyclobenzaprine 5 MG tablet  Commonly known as: FLEXERIL   Take 1 tablet by mouth 2 (Two) Times a Day As Needed for muscle spasms      donepezil 5 MG tablet  Commonly known as: ARICEPT   5 mg, Oral, Daily      DULoxetine 60 MG capsule  Commonly known as: CYMBALTA   60 mg, Oral, Daily      gabapentin 600 MG tablet  Commonly known as: NEURONTIN   600 mg, Oral, 3 Times Daily      HumaLOG KwikPen 100 UNIT/ML solution pen-injector  Generic drug: Insulin Lispro (1 Unit Dial)   Inject according to sliding scale:  glucose=151-200 use 2 UNITS; glucose=201-250 use 4 units; glucose=251-300 use 6 units; above 301 use 8 units      HumuLIN R U-500 KwikPen 500 UNIT/ML solution pen-injector CONCENTRATED injection  Generic drug: Insulin Regular Human (Conc)   Inject 120 units under the skin into the appropriate area as directed Every Morning with Breakfast  units Every Evening with Supper.      HYDROcodone-acetaminophen 7.5-325 MG per tablet  Commonly known as: NORCO   1 tablet, Oral, 2 Times Daily PRN      ipratropium-albuterol 0.5-2.5 mg/3 ml nebulizer  Commonly known as: DUO-NEB   3 mL, Nebulization, Every 6 Hours      Kerendia 10 MG tablet  Generic drug: Finerenone   10 mg, Oral, Daily      pantoprazole 40 MG EC tablet  Commonly known as: PROTONIX   Take 1 tablet by mouth twice a day.      prazosin 2 MG capsule  Commonly known as: MINIPRESS   2 mg, Oral, Every Night at Bedtime      rosuvastatin 40 MG tablet  Commonly known as: CRESTOR   40 mg, Oral, Nightly      sodium hypochlorite 0.25 % topical solution  Commonly known as: DAKIN'S   Apply to wound twice a day as directed       tamsulosin 0.4 MG capsule 24 hr capsule  Commonly known as: FLOMAX   1 capsule, Oral, Daily      Trulicity 1.5 MG/0.5ML solution pen-injector  Generic drug: Dulaglutide   1.5 mg, Subcutaneous, Weekly         Stop These Medications    irbesartan 300 MG tablet  Commonly known as: AVAPRO     metOLazone 2.5 MG tablet  Commonly known as: ZAROXOLYN     ondansetron ODT 4 MG disintegrating tablet  Commonly known as: ZOFRAN-ODT            Discharge Diet:   Diet Instructions     Diet: Consistent Carbohydrate; Thin      Discharge Diet: Consistent Carbohydrate    Fluid Consistency: Thin          Discharge Care Plan / Instructions:   Discharge home    Activity at Discharge:   Activity Instructions     Activity as Tolerated            Follow-up Appointments:  Follow-up with primary care physician next week  Follow-up with nephrology as scheduled       Electronically signed by Payam Keyes DO, 12/11/21, 15:07 CST.    Time: Discharge over 30 min    Part of this note may be an electronic transcription/translation of spoken language to printed text using the Dragon Dictation system.        Electronically signed by Payam Keyes DO at 12/11/21 [id number]

## 2021-12-14 ENCOUNTER — HOME CARE VISIT (OUTPATIENT)
Dept: HOME HEALTH SERVICES | Facility: HOME HEALTHCARE | Age: 66
End: 2021-12-14

## 2021-12-14 ENCOUNTER — READMISSION MANAGEMENT (OUTPATIENT)
Dept: CALL CENTER | Facility: HOSPITAL | Age: 66
End: 2021-12-14

## 2021-12-14 NOTE — OUTREACH NOTE
Medical Week 1 Survey      Responses   [facility] patient discharged from? [facility]   Does the patient have one of the following disease processes/diagnoses(primary or secondary)? Other   Week 1 attempt successful? 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 Yes   Does the patient have all medications ordered at discharge? N/A   Is the patient taking all medications as directed (includes completed medication regime)? Yes   Does the patient have a primary care provider?  Yes   Does the patient have an appointment with their PCP within 7 days of discharge? Yes   Comments regarding PCP PCP APPOINTMENT IS TOMORROW 12/15/21   Has the patient kept scheduled appointments due by today? N/A   What is the Home health agency?  [facility]   Has home health visited the patient within 72 hours of discharge? Call prior to 72 hours   Home health comments PATIENT STATES HOME HEALTH CALLED, BUT HE DECLINED SERVICES. HE STATES HE DOES NOT NEED HOME HEALTH.    Psychosocial issues? No   Did the patient receive a copy of their discharge instructions? Yes   Nursing interventions Reviewed instructions with patient   What is the patient's perception of their health status since discharge? Improving   Is the patient/caregiver able to teach back signs and symptoms related to disease process for when to call PCP? Yes   Is the patient/caregiver able to teach back signs and symptoms related to disease process for when to call 911? Yes   Is the patient/caregiver able to teach back the hierarchy of who to call/visit for symptoms/problems? PCP, Specialist, Home health nurse, Urgent Care, ED, 911 Yes   If the patient is a current smoker, are they able to teach back resources for cessation? Not a smoker   Week 1 call completed? Yes          Amirah Altamirano LPN

## 2021-12-15 ENCOUNTER — PRE-ADMISSION TESTING (OUTPATIENT)
Dept: PREADMISSION TESTING | Facility: HOSPITAL | Age: 66
End: 2021-12-15

## 2021-12-15 VITALS
HEART RATE: 77 BPM | RESPIRATION RATE: 16 BRPM | SYSTOLIC BLOOD PRESSURE: 151 MMHG | DIASTOLIC BLOOD PRESSURE: 61 MMHG | OXYGEN SATURATION: 100 % | WEIGHT: 315 LBS | HEIGHT: 72 IN | BODY MASS INDEX: 42.66 KG/M2

## 2021-12-15 DIAGNOSIS — M48.02 SPINAL STENOSIS IN CERVICAL REGION: ICD-10-CM

## 2021-12-15 DIAGNOSIS — M50.30 DEGENERATION OF CERVICAL INTERVERTEBRAL DISC: ICD-10-CM

## 2021-12-15 DIAGNOSIS — M54.12 CERVICAL RADICULOPATHY: ICD-10-CM

## 2021-12-15 LAB
ALBUMIN SERPL-MCNC: 4 G/DL (ref 3.5–5.2)
ALBUMIN/GLOB SERPL: 1.3 G/DL
ALP SERPL-CCNC: 97 U/L (ref 39–117)
ALT SERPL W P-5'-P-CCNC: 14 U/L (ref 1–41)
ANION GAP SERPL CALCULATED.3IONS-SCNC: 10 MMOL/L (ref 5–15)
AST SERPL-CCNC: 16 U/L (ref 1–40)
BILIRUB SERPL-MCNC: 0.4 MG/DL (ref 0–1.2)
BILIRUB UR QL STRIP: NEGATIVE
BUN SERPL-MCNC: 58 MG/DL (ref 8–23)
BUN/CREAT SERPL: 22.3 (ref 7–25)
CALCIUM SPEC-SCNC: 9.1 MG/DL (ref 8.6–10.5)
CHLORIDE SERPL-SCNC: 94 MMOL/L (ref 98–107)
CLARITY UR: CLEAR
CO2 SERPL-SCNC: 30 MMOL/L (ref 22–29)
COLOR UR: YELLOW
CREAT SERPL-MCNC: 2.6 MG/DL (ref 0.76–1.27)
DEPRECATED RDW RBC AUTO: 44 FL (ref 37–54)
ERYTHROCYTE [DISTWIDTH] IN BLOOD BY AUTOMATED COUNT: 13.5 % (ref 12.3–15.4)
GFR SERPL CREATININE-BSD FRML MDRD: 25 ML/MIN/1.73
GLOBULIN UR ELPH-MCNC: 3.1 GM/DL
GLUCOSE SERPL-MCNC: 386 MG/DL (ref 65–99)
GLUCOSE UR STRIP-MCNC: ABNORMAL MG/DL
HCT VFR BLD AUTO: 34.8 % (ref 37.5–51)
HGB BLD-MCNC: 10.7 G/DL (ref 13–17.7)
HGB UR QL STRIP.AUTO: NEGATIVE
KETONES UR QL STRIP: NEGATIVE
LEUKOCYTE ESTERASE UR QL STRIP.AUTO: NEGATIVE
MCH RBC QN AUTO: 27.2 PG (ref 26.6–33)
MCHC RBC AUTO-ENTMCNC: 30.7 G/DL (ref 31.5–35.7)
MCV RBC AUTO: 88.5 FL (ref 79–97)
NITRITE UR QL STRIP: NEGATIVE
PH UR STRIP.AUTO: 6.5 [PH] (ref 5–8)
PLATELET # BLD AUTO: 265 10*3/MM3 (ref 140–450)
PMV BLD AUTO: 11 FL (ref 6–12)
POTASSIUM SERPL-SCNC: 5 MMOL/L (ref 3.5–5.2)
PROT SERPL-MCNC: 7.1 G/DL (ref 6–8.5)
PROT UR QL STRIP: NEGATIVE
RBC # BLD AUTO: 3.93 10*6/MM3 (ref 4.14–5.8)
SODIUM SERPL-SCNC: 134 MMOL/L (ref 136–145)
SP GR UR STRIP: 1.01 (ref 1–1.03)
UROBILINOGEN UR QL STRIP: ABNORMAL
WBC NRBC COR # BLD: 8.75 10*3/MM3 (ref 3.4–10.8)

## 2021-12-15 PROCEDURE — 80053 COMPREHEN METABOLIC PANEL: CPT

## 2021-12-15 PROCEDURE — 81003 URINALYSIS AUTO W/O SCOPE: CPT

## 2021-12-15 PROCEDURE — 36415 COLL VENOUS BLD VENIPUNCTURE: CPT

## 2021-12-15 PROCEDURE — 85027 COMPLETE CBC AUTOMATED: CPT

## 2021-12-15 NOTE — DISCHARGE INSTRUCTIONS
DAY OF SURGERY INSTRUCTIONS          ARRIVAL TIME: AS DIRECTED BY OFFICE    YOU MAY TAKE THE FOLLOWING MEDICATION(S) THE MORNING OF SURGERY WITH A SIP OF WATER: buspar, carvedilol, gabapentin, norco       ALL OTHER HOME MEDICATION CHECK WITH YOUR PHYSICIAN      DO NOT TAKE ANY ERECTILE DYSFUNCTION MEDICATIONS (EX: CIALIS, VIAGRA) 24 HOURS PRIOR TO SURGERY                      MANAGING PAIN AFTER SURGERY    We know you are probably wondering what your pain will be like after surgery.  Following surgery it is unrealistic to expect you will not have pain.   Pain is how our bodies let us know that something is wrong or cautions us to be careful.  That said, our goal is to make your pain tolerable.    Methods we may use to treat your pain include (oral or IV medications, PCAs, epidurals, nerve blocks, etc.)   While some procedures require IV pain medications for a short time after surgery, transitioning to pain medications by mouth allows for better management of pain.   Your nurse will encourage you to take oral pain medications whenever possible.  IV medications work almost immediately, but only last a short while.  Taking medications by mouth allows for a more constant level of medication in your blood stream for a longer period of time.      Once your pain is out of control it is harder to get back under control.  It is important you are aware when your next dose of pain medication is due.  If you are admitted, your nurse may write the time of your next dose on the white board in your room to help you remember.      We are interested in your pain and encourage you to inform us about aggravating factors during your visit.   Many times a simple repositioning every few hours can make a big difference.    If your physician says it is okay, do not let your pain prevent you from getting out of bed. Be sure to call your nurse for assistance prior to getting up so you do not fall.      Before surgery, please decide your  tolerable pain goal.  These faces help describe the pain ratings we use on a 0-10 scale.   Be prepared to tell us your goal and whether or not you take pain or anxiety medications at home.          BEFORE YOU COME TO THE HOSPITAL  (Pre-op instructions)  • Do not eat, drink, smoke or chew gum after midnight the night before surgery.  This also includes no mints.  • Morning of surgery take only the medicines you have been instructed with a sip of water unless otherwise instructed  by your physician.  • Do not shave, wear makeup or dark nail polish.  • Remove all jewelry including rings.  • Leave anything you consider valuable at home.  • Leave your suitcase in the car until after your surgery.  • Bring the following with you if applicable:  o Picture ID and insurance, Medicare or Medicaid cards  o Co-pay/deductible required by insurance (cash, check, credit card)  o Copy of advance directive, living will or power-of- documents if not brought to pre-work  o CPAP or BIPAP mask and tubing  o Relaxation aids ( book, magazine), etc.  o Hearing aids                        ON THE DAY OF SURGERY  · On the day of surgery check in at registration located at the main entrance of the hospital. Only one family member or friend are allowed per patient.  ? You will be registered and given a beeper with instructions where to wait in the main lobby.  ? When your beeper lights up and vibrates a member of the Outpatient Surgery staff will meet you at the double doors under the stair steps and escort you to your preoperative room.   · You may have cloth compression devices placed on your legs. These help to prevent blood clots and reduce swelling in your legs.  · An IV may be inserted into one of your veins.  · In the operating room, you may be given one or more of the following:  ? A medicine to help you relax (sedative).  ? A medicine to numb the area (local anesthetic).  ? A medicine to make you fall asleep (general  "anesthetic).  ? A medicine that is injected into an area of your body to numb everything below the injection site (regional anesthetic).  · Your surgical site will be marked or identified.  · You may be given an antibiotic through your IV to help prevent infection.  Contact a health care provider if you:  · Develop a fever of more than 100.4°F (38°C) or other feelings of illness during the 48 hours before your surgery.  · Have symptoms that get worse.  Have questions or concerns about your surgery    General Anesthesia/Surgery, Adult  General anesthesia is the use of medicines to make a person \"go to sleep\" (unconscious) for a medical procedure. General anesthesia must be used for certain procedures, and is often recommended for procedures that:  · Last a long time.  · Require you to be still or in an unusual position.  · Are major and can cause blood loss.  The medicines used for general anesthesia are called general anesthetics. As well as making you unconscious for a certain amount of time, these medicines:  · Prevent pain.  · Control your blood pressure.  · Relax your muscles.  Tell a health care provider about:  · Any allergies you have.  · All medicines you are taking, including vitamins, herbs, eye drops, creams, and over-the-counter medicines.  · Any problems you or family members have had with anesthetic medicines.  · Types of anesthetics you have had in the past.  · Any blood disorders you have.  · Any surgeries you have had.  · Any medical conditions you have.  · Any recent upper respiratory, chest, or ear infections.  · Any history of:  ? Heart or lung conditions, such as heart failure, sleep apnea, asthma, or chronic obstructive pulmonary disease (COPD).  ?  service.  ? Depression or anxiety.  · Any tobacco or drug use, including marijuana or alcohol use.  · Whether you are pregnant or may be pregnant.  What are the risks?  Generally, this is a safe procedure. However, problems may occur, " including:  · Allergic reaction.  · Lung and heart problems.  · Inhaling food or liquid from the stomach into the lungs (aspiration).  · Nerve injury.  · Air in the bloodstream, which can lead to stroke.  · Extreme agitation or confusion (delirium) when you wake up from the anesthetic.  · Waking up during your procedure and being unable to move. This is rare.  These problems are more likely to develop if you are having a major surgery or if you have an advanced or serious medical condition. You can prevent some of these complications by answering all of your health care provider's questions thoroughly and by following all instructions before your procedure.  General anesthesia can cause side effects, including:  · Nausea or vomiting.  · A sore throat from the breathing tube.  · Hoarseness.  · Wheezing or coughing.  · Shaking chills.  · Tiredness.  · Body aches.  · Anxiety.  · Sleepiness or drowsiness.  · Confusion or agitation.  RISKS AND COMPLICATIONS OF SURGERY  Your health care provider will discuss possible risks and complications with you before surgery. Common risks and complications include:    · Problems due to the use of anesthetics.  · Blood loss and replacement (does not apply to minor surgical procedures).  · Temporary increase in pain due to surgery.  · Uncorrected pain or problems that the surgery was meant to correct.  · Infection.  · New damage.    What happens before the procedure?    Medicines  Ask your health care provider about:  · Changing or stopping your regular medicines. This is especially important if you are taking diabetes medicines or blood thinners.  · Taking medicines such as aspirin and ibuprofen. These medicines can thin your blood. Do not take these medicines unless your health care provider tells you to take them.  · Taking over-the-counter medicines, vitamins, herbs, and supplements. Do not take these during the week before your procedure unless your health care provider approves  them.  General instructions  · Starting 3-6 weeks before the procedure, do not use any products that contain nicotine or tobacco, such as cigarettes and e-cigarettes. If you need help quitting, ask your health care provider.  · If you brush your teeth on the morning of the procedure, make sure to spit out all of the toothpaste.  · Tell your health care provider if you become ill or develop a cold, cough, or fever.  · If instructed by your health care provider, bring your sleep apnea device with you on the day of your surgery (if applicable).  · Ask your health care provider if you will be going home the same day, the following day, or after a longer hospital stay.  ? Plan to have someone take you home from the hospital or clinic.  ? Plan to have a responsible adult care for you for at least 24 hours after you leave the hospital or clinic. This is important.  What happens during the procedure?  · You will be given anesthetics through both of the following:  ? A mask placed over your nose and mouth.  ? An IV in one of your veins.  · You may receive a medicine to help you relax (sedative).  · After you are unconscious, a breathing tube may be inserted down your throat to help you breathe. This will be removed before you wake up.  · An anesthesia specialist will stay with you throughout your procedure. He or she will:  ? Keep you comfortable and safe by continuing to give you medicines and adjusting the amount of medicine that you get.  ? Monitor your blood pressure, pulse, and oxygen levels to make sure that the anesthetics do not cause any problems.  The procedure may vary among health care providers and hospitals.  What happens after the procedure?  · Your blood pressure, temperature, heart rate, breathing rate, and blood oxygen level will be monitored until the medicines you were given have worn off.  · You will wake up in a recovery area. You may wake up slowly.  · If you feel anxious or agitated, you may be given  medicine to help you calm down.  · If you will be going home the same day, your health care provider may check to make sure you can walk, drink, and urinate.  · Your health care provider will treat any pain or side effects you have before you go home.  · Do not drive for 24 hours if you were given a sedative.  Summary  · General anesthesia is used to keep you still and prevent pain during a procedure.  · It is important to tell your healthcare provider about your medical history and any surgeries you have had, and previous experience with anesthesia.  · Follow your healthcare provider’s instructions about when to stop eating, drinking, or taking certain medicines before your procedure.  · Plan to have someone take you home from the hospital or clinic.  This information is not intended to replace advice given to you by your health care provider. Make sure you discuss any questions you have with your health care provider.  Document Released: 03/26/2009 Document Revised: 08/03/2018 Document Reviewed: 08/03/2018  [employer] Interactive Patient Education © 2019 [employer]       Fall Prevention in Hospitals, Adult  As a hospital patient, your condition and the treatments you receive can increase your risk for falls. Some additional risk factors for falls in a hospital include:  · Being in an unfamiliar environment.  · Being on bed rest.  · Your surgery.  · Taking certain medicines.  · Your tubing requirements, such as intravenous (IV) therapy or catheters.  It is important that you learn how to decrease fall risks while at the hospital. Below are important tips that can help prevent falls.  SAFETY TIPS FOR PREVENTING FALLS  Talk about your risk of falling.  · Ask your health care provider why you are at risk for falling. Is it your medicine, illness, tubing placement, or something else?  · Make a plan with your health care provider to keep you safe from falls.  · Ask your health care provider or pharmacist about side  effects of your medicines. Some medicines can make you dizzy or affect your coordination.  Ask for help.  · Ask for help before getting out of bed. You may need to press your call button.  · Ask for assistance in getting safely to the toilet.  · Ask for a walker or cane to be put at your bedside. Ask that most of the side rails on your bed be placed up before your health care provider leaves the room.  · Ask family or friends to sit with you.  · Ask for things that are out of your reach, such as your glasses, hearing aids, telephone, bedside table, or call button.  Follow these tips to avoid falling:  · Stay lying or seated, rather than standing, while waiting for help.  · Wear rubber-soled slippers or shoes whenever you walk in the hospital.  · Avoid quick, sudden movements.  ¨ Change positions slowly.  ¨ Sit on the side of your bed before standing.  ¨ Stand up slowly and wait before you start to walk.  · Let your health care provider know if there is a spill on the floor.  · Pay careful attention to the medical equipment, electrical cords, and tubes around you.  · When you need help, use your call button by your bed or in the bathroom. Wait for one of your health care providers to help you.  · If you feel dizzy or unsure of your footing, return to bed and wait for assistance.  · Avoid being distracted by the TV, telephone, or another person in your room.  · Do not lean or support yourself on rolling objects, such as IV poles or bedside tables.     This information is not intended to replace advice given to you by your health care provider. Make sure you discuss any questions you have with your health care provider.     Document Released: 12/15/2001 Document Revised: 01/08/2016 Document Reviewed: 08/25/2013  [employer] Interactive Patient Education ©2016 [employer]       [facility]  CHG 4% Patient Instruction Sheet    Chlorhexidine Before Surgery  Chlorhexidine gluconate (CHG) is a germ-killing  (antiseptic) solution that is used to clean the skin. It gets rid of the bacteria that normally live on the skin. Cleaning your skin with CHG before surgery helps lower the risk for infection after surgery.    How to use CHG solution  · You will take 2 showers, one shower the night before surgery, the second shower the morning of surgery before coming to the hospital.  · Use CHG only as told by your health care provider, and follow the instructions on the label.  · Use CHG solution while taking a shower. Follow these steps when using CHG solution (unless your health care provider gives you different instructions):  1. Start the shower.  2. Use your normal soap and shampoo to wash your face and hair.  3. Turn off the shower or move out of the shower stream.  4. Pour the CHG onto a clean washcloth. Do not use any type of brush or rough-edged sponge.  5. Starting at your neck, lather your body down to your toes. Make sure you:  6. Pay special attention to the part of your body where you will be having surgery. Scrub this area for at least 1 minute.  7. Use the full amount of CHG as directed. Usually, this is one half bottle for each shower.  8. Do not use CHG on your head or face. If the solution gets into your ears or eyes, rinse them well with water.  9. Avoid your genital area.  10. Avoid any areas of skin that have broken skin, cuts, or scrapes.  11. Scrub your back and under your arms. Make sure to wash skin folds.  12. Let the lather sit on your skin for 1-2 minutes or as long as told by your health care  provider.  13. Thoroughly rinse your entire body in the shower. Make sure that all body creases and crevices are rinsed well.  14. Dry off with a clean towel. Do not put any substances on your body afterward, such as powder, lotion, or perfume.  15. Put on clean clothes or pajamas.  16. If it is the night before your surgery, sleep in clean sheets.    What are the risks?  Risks of using CHG include:  · A skin  reaction.  · Hearing loss, if CHG gets in your ears.  · Eye injury, if CHG gets in your eyes and is not rinsed out.  · The CHG product catching fire.  Make sure that you avoid smoking and flames after applying CHG to your skin.  Do not use CHG:  · If you have a chlorhexidine allergy or have previously reacted to chlorhexidine.  · On babies younger than 2 months of age.      On the day of surgery, when you are taken to your room in Outpatient Surgery you will be given a CHG prepackaged cloth to wipe the site for your surgery.  How to use CHG prepackaged cloths  · Follow the instructions on the label.  · Use the CHG cloth on clean, dry skin. Follow these steps when using a CHG cloth (unless your health care provider gives you different instructions):  1. Using the CHG cloth, vigorously scrub the part of your body where you will be having surgery. Scrub using a back-and-forth motion for 3 minutes. The area on your body should be completely wet with CHG when you are finished scrubbing.  2. Do not rinse. Discard the cloth and let the area air-dry for 1 minute. Do not put any substances on your body afterward, such as powder, lotion, or perfume.  Contact a health care provider if:  · Your skin gets irritated after scrubbing.  · You have questions about using your solution or cloth.  Get help right away if:  · Your eyes become very red or swollen.  · Your eyes itch badly.  · Your skin itches badly and is red or swollen.  · Your hearing changes.  · You have trouble seeing.  · You have swelling or tingling in your mouth or throat.  · You have trouble breathing.  · You swallow any chlorhexidine.  Summary  · Chlorhexidine gluconate (CHG) is a germ-killing (antiseptic) solution that is used to clean the skin. Cleaning your skin with CHG before surgery helps lower the risk for infection after surgery.  · You may be given CHG to use at home. It may be in a bottle or in a prepackaged cloth to use on your skin. Carefully follow  your health care provider's instructions and the instructions on the product label.  · Do not use CHG if you have a chlorhexidine allergy.  · Contact your health care provider if your skin gets irritated after scrubbing.  This information is not intended to replace advice given to you by your health care provider. Make sure you discuss any questions you have with your health care provider.  Document Released: 09/11/2013 Document Revised: 11/15/2018 Document Reviewed: 11/15/2018  [employer] Interactive Patient Education © 2019 [employer]          PATIENT/FAMILY/RESPONSIBLE PARTY VERBALIZES UNDERSTANDING OF ABOVE EDUCATION.  COPY OF PAIN SCALE GIVEN AND REVIEWED WITH VERBALIZED UNDERSTANDING.

## 2021-12-16 ENCOUNTER — HOME CARE VISIT (OUTPATIENT)
Dept: HOME HEALTH SERVICES | Facility: CLINIC | Age: 66
End: 2021-12-16

## 2021-12-16 VITALS
HEART RATE: 70 BPM | DIASTOLIC BLOOD PRESSURE: 78 MMHG | OXYGEN SATURATION: 100 % | RESPIRATION RATE: 18 BRPM | TEMPERATURE: 97.8 F | SYSTOLIC BLOOD PRESSURE: 144 MMHG

## 2021-12-16 PROCEDURE — G0299 HHS/HOSPICE OF RN EA 15 MIN: HCPCS

## 2021-12-21 ENCOUNTER — TRANSCRIBE ORDERS (OUTPATIENT)
Dept: ADMINISTRATIVE | Facility: HOSPITAL | Age: 66
End: 2021-12-21

## 2021-12-21 DIAGNOSIS — D50.0 IRON DEFICIENCY ANEMIA SECONDARY TO BLOOD LOSS (CHRONIC): Primary | ICD-10-CM

## 2021-12-22 ENCOUNTER — READMISSION MANAGEMENT (OUTPATIENT)
Dept: CALL CENTER | Facility: HOSPITAL | Age: 66
End: 2021-12-22

## 2021-12-22 NOTE — OUTREACH NOTE
Medical Week 2 Survey      Responses   [facility] ([facility] patient discharged from? [place]   Does the patient have one of the following disease processes/diagnoses(primary or secondary)? Other   Week 2 attempt successful? Yes   Call start time 1423   Call end time 1429   Person spoke with today (if not patient) and relationship Joan   Meds reviewed with patient/caregiver? Yes   Is the patient taking all medications as directed (includes completed medication regime)? Yes   Has the patient kept scheduled appointments due by today? Yes   Comments Neck Surgery Jan, had foot surgery,/ biopsy in office to culture has organisms growing,  by  had to switch antibiotics    Comments foot growing organisms changed antibiotics, HH coming THursday, bottom of foot not attaching, wife says,    What is the patient's perception of their health status since discharge? Same   Week 2 Call Completed? Yes   Revoked No further contact(revokes)-requires comment  [Wife states, his labs were off, potassium was 5.5, and foot biopsy growing organisms,antibiotics changed,  wife states he is doing ok, but does not feel right, she says no need for calls, she works at [employer], can call us or theDr. if needs anything.]   Graduated/Revoked comments Wife states, his labs were off, potassium was 5.5, and foot biopsy growing organisms,antibiotics changed,  wife states he is doing ok, but does not feel right, she says no need for calls, she works at [employer], can call us or theDr. if needs anything.   Wrap up additional comments Wife states, his labs were off, potassium was 5.5, and foot biopsy growing organisms,antibiotics changed,  wife states he is doing ok, but does not feel right, she says no need for calls, she works at [employer], can call us or theDr. if needs anything.           Kalli Perdomo, RN

## 2021-12-23 ENCOUNTER — HOME CARE VISIT (OUTPATIENT)
Dept: HOME HEALTH SERVICES | Facility: CLINIC | Age: 66
End: 2021-12-23

## 2021-12-23 ENCOUNTER — LAB (OUTPATIENT)
Dept: LAB | Facility: HOSPITAL | Age: 66
End: 2021-12-23

## 2021-12-23 ENCOUNTER — HOSPITAL ENCOUNTER (EMERGENCY)
Facility: HOSPITAL | Age: 66
Discharge: LEFT BEFORE DISCHARGE | End: 2021-12-23

## 2021-12-23 VITALS
RESPIRATION RATE: 18 BRPM | DIASTOLIC BLOOD PRESSURE: 67 MMHG | SYSTOLIC BLOOD PRESSURE: 165 MMHG | WEIGHT: 315 LBS | BODY MASS INDEX: 42.66 KG/M2 | TEMPERATURE: 97.6 F | OXYGEN SATURATION: 99 % | HEART RATE: 80 BPM | HEIGHT: 72 IN

## 2021-12-23 DIAGNOSIS — D64.9 ANEMIA, UNSPECIFIED TYPE: ICD-10-CM

## 2021-12-23 DIAGNOSIS — N40.0 BPH WITHOUT OBSTRUCTION/LOWER URINARY TRACT SYMPTOMS: ICD-10-CM

## 2021-12-23 DIAGNOSIS — E11.42 TYPE 2 DIABETES MELLITUS WITH DIABETIC POLYNEUROPATHY, UNSPECIFIED WHETHER LONG TERM INSULIN USE (HCC): ICD-10-CM

## 2021-12-23 DIAGNOSIS — E78.5 HYPERLIPIDEMIA, UNSPECIFIED HYPERLIPIDEMIA TYPE: ICD-10-CM

## 2021-12-23 DIAGNOSIS — D50.0 IRON DEFICIENCY ANEMIA SECONDARY TO BLOOD LOSS (CHRONIC): ICD-10-CM

## 2021-12-23 DIAGNOSIS — I10 ESSENTIAL (PRIMARY) HYPERTENSION: ICD-10-CM

## 2021-12-23 LAB
25(OH)D3 SERPL-MCNC: 18.4 NG/ML (ref 30–100)
ALBUMIN SERPL-MCNC: 4.3 G/DL (ref 3.5–5.2)
ALBUMIN UR-MCNC: 20.2 MG/DL
ALBUMIN/GLOB SERPL: 1.3 G/DL
ALP SERPL-CCNC: 117 U/L (ref 39–117)
ALT SERPL W P-5'-P-CCNC: 11 U/L (ref 1–41)
ANION GAP SERPL CALCULATED.3IONS-SCNC: 14 MMOL/L (ref 5–15)
AST SERPL-CCNC: 15 U/L (ref 1–40)
BASOPHILS # BLD AUTO: 0.05 10*3/MM3 (ref 0–0.2)
BASOPHILS NFR BLD AUTO: 0.5 % (ref 0–1.5)
BILIRUB SERPL-MCNC: 0.7 MG/DL (ref 0–1.2)
BUN SERPL-MCNC: 90 MG/DL (ref 8–23)
BUN/CREAT SERPL: 32.3 (ref 7–25)
CALCIUM SPEC-SCNC: 9.2 MG/DL (ref 8.6–10.5)
CHLORIDE SERPL-SCNC: 87 MMOL/L (ref 98–107)
CHOLEST SERPL-MCNC: 163 MG/DL (ref 0–200)
CO2 SERPL-SCNC: 27 MMOL/L (ref 22–29)
CREAT SERPL-MCNC: 2.79 MG/DL (ref 0.76–1.27)
CREAT UR-MCNC: 55.6 MG/DL
DEPRECATED RDW RBC AUTO: 42.2 FL (ref 37–54)
EOSINOPHIL # BLD AUTO: 0.28 10*3/MM3 (ref 0–0.4)
EOSINOPHIL NFR BLD AUTO: 2.6 % (ref 0.3–6.2)
ERYTHROCYTE [DISTWIDTH] IN BLOOD BY AUTOMATED COUNT: 13.5 % (ref 12.3–15.4)
FERRITIN SERPL-MCNC: 259.4 NG/ML (ref 30–400)
FOLATE SERPL-MCNC: 12.4 NG/ML (ref 4.78–24.2)
GFR SERPL CREATININE-BSD FRML MDRD: 23 ML/MIN/1.73
GLOBULIN UR ELPH-MCNC: 3.3 GM/DL
GLUCOSE SERPL-MCNC: 489 MG/DL (ref 65–99)
HBA1C MFR BLD: 9.5 % (ref 4.8–5.6)
HCT VFR BLD AUTO: 32.1 % (ref 37.5–51)
HDLC SERPL-MCNC: 42 MG/DL (ref 40–60)
HGB BLD-MCNC: 10.5 G/DL (ref 13–17.7)
IMM GRANULOCYTES # BLD AUTO: 0.06 10*3/MM3 (ref 0–0.05)
IMM GRANULOCYTES NFR BLD AUTO: 0.5 % (ref 0–0.5)
IRON 24H UR-MRATE: 59 MCG/DL (ref 59–158)
IRON SATN MFR SERPL: 16 % (ref 20–50)
LDLC SERPL CALC-MCNC: 87 MG/DL (ref 0–100)
LDLC/HDLC SERPL: 1.92 {RATIO}
LYMPHOCYTES # BLD AUTO: 1.49 10*3/MM3 (ref 0.7–3.1)
LYMPHOCYTES NFR BLD AUTO: 13.6 % (ref 19.6–45.3)
MCH RBC QN AUTO: 27.6 PG (ref 26.6–33)
MCHC RBC AUTO-ENTMCNC: 32.7 G/DL (ref 31.5–35.7)
MCV RBC AUTO: 84.5 FL (ref 79–97)
MICROALBUMIN/CREAT UR: 363.3 MG/G
MONOCYTES # BLD AUTO: 0.97 10*3/MM3 (ref 0.1–0.9)
MONOCYTES NFR BLD AUTO: 8.9 % (ref 5–12)
NEUTROPHILS NFR BLD AUTO: 73.9 % (ref 42.7–76)
NEUTROPHILS NFR BLD AUTO: 8.11 10*3/MM3 (ref 1.7–7)
NRBC BLD AUTO-RTO: 0 /100 WBC (ref 0–0.2)
PLATELET # BLD AUTO: 293 10*3/MM3 (ref 140–450)
PMV BLD AUTO: 11 FL (ref 6–12)
POTASSIUM SERPL-SCNC: 5 MMOL/L (ref 3.5–5.2)
PROT SERPL-MCNC: 7.6 G/DL (ref 6–8.5)
PSA SERPL-MCNC: 0.19 NG/ML (ref 0–4)
RBC # BLD AUTO: 3.8 10*6/MM3 (ref 4.14–5.8)
SODIUM SERPL-SCNC: 128 MMOL/L (ref 136–145)
T4 FREE SERPL-MCNC: 1.16 NG/DL (ref 0.93–1.7)
TIBC SERPL-MCNC: 359 MCG/DL (ref 298–536)
TRANSFERRIN SERPL-MCNC: 241 MG/DL (ref 200–360)
TRIGL SERPL-MCNC: 202 MG/DL (ref 0–150)
TSH SERPL DL<=0.05 MIU/L-ACNC: 1.78 UIU/ML (ref 0.27–4.2)
VIT B12 BLD-MCNC: 344 PG/ML (ref 211–946)
VLDLC SERPL-MCNC: 34 MG/DL (ref 5–40)
WBC NRBC COR # BLD: 10.96 10*3/MM3 (ref 3.4–10.8)

## 2021-12-23 PROCEDURE — 82306 VITAMIN D 25 HYDROXY: CPT

## 2021-12-23 PROCEDURE — 82728 ASSAY OF FERRITIN: CPT

## 2021-12-23 PROCEDURE — 80061 LIPID PANEL: CPT

## 2021-12-23 PROCEDURE — 85025 COMPLETE CBC W/AUTO DIFF WBC: CPT

## 2021-12-23 PROCEDURE — 84466 ASSAY OF TRANSFERRIN: CPT

## 2021-12-23 PROCEDURE — 84439 ASSAY OF FREE THYROXINE: CPT

## 2021-12-23 PROCEDURE — 82607 VITAMIN B-12: CPT

## 2021-12-23 PROCEDURE — 36415 COLL VENOUS BLD VENIPUNCTURE: CPT

## 2021-12-23 PROCEDURE — 83036 HEMOGLOBIN GLYCOSYLATED A1C: CPT

## 2021-12-23 PROCEDURE — 83540 ASSAY OF IRON: CPT

## 2021-12-23 PROCEDURE — 84153 ASSAY OF PSA TOTAL: CPT

## 2021-12-23 PROCEDURE — 80053 COMPREHEN METABOLIC PANEL: CPT

## 2021-12-23 PROCEDURE — 82043 UR ALBUMIN QUANTITATIVE: CPT

## 2021-12-23 PROCEDURE — 82570 ASSAY OF URINE CREATININE: CPT

## 2021-12-23 PROCEDURE — 84443 ASSAY THYROID STIM HORMONE: CPT

## 2021-12-23 PROCEDURE — 99211 OFF/OP EST MAY X REQ PHY/QHP: CPT

## 2021-12-23 PROCEDURE — 82746 ASSAY OF FOLIC ACID SERUM: CPT

## 2021-12-27 ENCOUNTER — HOME CARE VISIT (OUTPATIENT)
Dept: HOME HEALTH SERVICES | Facility: CLINIC | Age: 66
End: 2021-12-27

## 2021-12-27 PROBLEM — M48.02 SPINAL STENOSIS IN CERVICAL REGION: Status: ACTIVE | Noted: 2021-09-29

## 2021-12-27 PROBLEM — M54.12 CERVICAL RADICULOPATHY: Status: ACTIVE | Noted: 2021-09-29

## 2021-12-27 PROBLEM — M50.30 DEGENERATION OF CERVICAL INTERVERTEBRAL DISC: Status: ACTIVE | Noted: 2021-09-29

## 2021-12-28 ENCOUNTER — TELEPHONE (OUTPATIENT)
Dept: NEUROSURGERY | Facility: CLINIC | Age: 66
End: 2021-12-28

## 2021-12-28 NOTE — TELEPHONE ENCOUNTER
LEFT MESSAGE ON PT WIFE- ZAINAB CELL PHONE WITH APPT INFORMATION FOR 1/6/22 @ 8AM WITH LESIA AND DR WOOD.     GAVE DIRECT NUMBER FOR CALL BACK IF QUESTIONS. [phone]

## 2021-12-28 NOTE — TELEPHONE ENCOUNTER
----- Message from SUSAN Monroy sent at 12/27/2021  4:22 PM CST -----  Regarding: RE: SUZETTE BLEKIS FOOT/TOE AND SURGERY  I need to see him next week on a day when Dr. Soliz is here I am trying to get in touch with his podiatrist to find out about the infection  ----- Message -----  From: Deborah Paz  Sent: 12/27/2021  12:05 PM CST  To: SUSAN Monroy, Laurence Melton  Subject: SUZETTE BELKIS FOOT/TOE AND SURGERY                 LESIA THIS IS THE MESSAGE YOU ASKED  ME TO SEND YOU IN REGARDS TO SUZETTE TAMAYO.     HIS WIFE ZAINAB IS CALLING STATING DR SOLIZ TOLD HER TO CALL THE OFFICE AND SET UP AN APPOINTMENT WITH YOU FOR THIS WEEK.     PER [place] SHE MESSAGED ME AND HAD ME MOVE HIS SURGERY FROM 1/5/22 TO 2/9/22 TO GIVE MORE TIME.     LET ME KNOW IF YOU DO WANT THEM TO COME IN THIS WEEK AND EITHER MYSELF OR LAURENCE CAN GET HIM AN APPOINTMENT.

## 2021-12-29 ENCOUNTER — LAB (OUTPATIENT)
Dept: LAB | Facility: HOSPITAL | Age: 66
End: 2021-12-29

## 2021-12-29 ENCOUNTER — TRANSCRIBE ORDERS (OUTPATIENT)
Dept: LAB | Facility: HOSPITAL | Age: 66
End: 2021-12-29

## 2021-12-29 DIAGNOSIS — E87.1 HYPO-OSMOLALITY AND HYPONATREMIA: Primary | ICD-10-CM

## 2021-12-29 DIAGNOSIS — E87.1 HYPO-OSMOLALITY AND HYPONATREMIA: ICD-10-CM

## 2021-12-29 DIAGNOSIS — D50.0 IRON DEFICIENCY ANEMIA SECONDARY TO BLOOD LOSS (CHRONIC): ICD-10-CM

## 2021-12-29 DIAGNOSIS — N17.9 ACUTE RENAL FAILURE, UNSPECIFIED ACUTE RENAL FAILURE TYPE: ICD-10-CM

## 2021-12-29 LAB
ALBUMIN SERPL-MCNC: 3.9 G/DL (ref 3.5–5.2)
ALBUMIN/GLOB SERPL: 1.1 G/DL
ALP SERPL-CCNC: 96 U/L (ref 39–117)
ALT SERPL W P-5'-P-CCNC: 13 U/L (ref 1–41)
ANION GAP SERPL CALCULATED.3IONS-SCNC: 12 MMOL/L (ref 5–15)
AST SERPL-CCNC: 12 U/L (ref 1–40)
BILIRUB SERPL-MCNC: 0.3 MG/DL (ref 0–1.2)
BUN SERPL-MCNC: 92 MG/DL (ref 8–23)
BUN/CREAT SERPL: 32.4 (ref 7–25)
CALCIUM SPEC-SCNC: 9 MG/DL (ref 8.6–10.5)
CHLORIDE SERPL-SCNC: 99 MMOL/L (ref 98–107)
CO2 SERPL-SCNC: 27 MMOL/L (ref 22–29)
CREAT SERPL-MCNC: 2.84 MG/DL (ref 0.76–1.27)
GFR SERPL CREATININE-BSD FRML MDRD: 22 ML/MIN/1.73
GLOBULIN UR ELPH-MCNC: 3.4 GM/DL
GLUCOSE SERPL-MCNC: 276 MG/DL (ref 65–99)
HBA1C MFR BLD: 9.5 % (ref 4.8–5.6)
OSMOLALITY SERPL: 328 MOSM/KG (ref 280–301)
OSMOLALITY UR: 369 MOSM/KG (ref 50–1400)
POTASSIUM SERPL-SCNC: 4.8 MMOL/L (ref 3.5–5.2)
PROT SERPL-MCNC: 7.3 G/DL (ref 6–8.5)
PSA SERPL-MCNC: 0.17 NG/ML (ref 0–4)
SODIUM SERPL-SCNC: 138 MMOL/L (ref 136–145)
SODIUM UR-SCNC: 90 MMOL/L

## 2021-12-29 PROCEDURE — 36415 COLL VENOUS BLD VENIPUNCTURE: CPT

## 2021-12-29 PROCEDURE — 83036 HEMOGLOBIN GLYCOSYLATED A1C: CPT

## 2021-12-29 PROCEDURE — G0103 PSA SCREENING: HCPCS

## 2021-12-29 PROCEDURE — 80053 COMPREHEN METABOLIC PANEL: CPT

## 2021-12-29 PROCEDURE — 83930 ASSAY OF BLOOD OSMOLALITY: CPT

## 2021-12-29 PROCEDURE — 83935 ASSAY OF URINE OSMOLALITY: CPT

## 2021-12-29 PROCEDURE — 84300 ASSAY OF URINE SODIUM: CPT

## 2021-12-30 ENCOUNTER — LAB (OUTPATIENT)
Dept: LAB | Facility: HOSPITAL | Age: 66
End: 2021-12-30

## 2021-12-30 LAB — CORTIS SERPL-MCNC: 16.6 MCG/DL

## 2021-12-30 PROCEDURE — 82533 TOTAL CORTISOL: CPT

## 2022-01-03 ENCOUNTER — TRANSCRIBE ORDERS (OUTPATIENT)
Dept: ADMINISTRATIVE | Facility: HOSPITAL | Age: 67
End: 2022-01-03

## 2022-01-03 DIAGNOSIS — N17.9 ACUTE RENAL FAILURE, UNSPECIFIED ACUTE RENAL FAILURE TYPE: Primary | ICD-10-CM

## 2022-01-03 LAB
ANION GAP SERPL CALCULATED.3IONS-SCNC: 14 MMOL/L (ref 7–19)
BUN BLDV-MCNC: 72 MG/DL (ref 8–23)
CALCIUM SERPL-MCNC: 8.9 MG/DL (ref 8.8–10.2)
CHLORIDE BLD-SCNC: 96 MMOL/L (ref 98–111)
CO2: 25 MMOL/L (ref 22–29)
CREAT SERPL-MCNC: 2.6 MG/DL (ref 0.5–1.2)
GFR AFRICAN AMERICAN: 30
GFR NON-AFRICAN AMERICAN: 25
GLUCOSE BLD-MCNC: 202 MG/DL (ref 74–109)
POTASSIUM SERPL-SCNC: 4.7 MMOL/L (ref 3.5–5)
SODIUM BLD-SCNC: 135 MMOL/L (ref 136–145)

## 2022-01-11 ENCOUNTER — OFFICE VISIT (OUTPATIENT)
Dept: NEUROSURGERY | Facility: CLINIC | Age: 67
End: 2022-01-11

## 2022-01-11 VITALS
SYSTOLIC BLOOD PRESSURE: 150 MMHG | BODY MASS INDEX: 42.66 KG/M2 | DIASTOLIC BLOOD PRESSURE: 72 MMHG | HEIGHT: 72 IN | WEIGHT: 315 LBS

## 2022-01-11 DIAGNOSIS — M54.12 CERVICAL RADICULOPATHY: ICD-10-CM

## 2022-01-11 DIAGNOSIS — M50.30 DEGENERATION OF CERVICAL INTERVERTEBRAL DISC: ICD-10-CM

## 2022-01-11 DIAGNOSIS — Z78.9 NONSMOKER: ICD-10-CM

## 2022-01-11 DIAGNOSIS — E66.01 CLASS 3 SEVERE OBESITY DUE TO EXCESS CALORIES WITH SERIOUS COMORBIDITY AND BODY MASS INDEX (BMI) OF 45.0 TO 49.9 IN ADULT: ICD-10-CM

## 2022-01-11 DIAGNOSIS — M48.02 SPINAL STENOSIS IN CERVICAL REGION: Primary | ICD-10-CM

## 2022-01-11 PROCEDURE — 99213 OFFICE O/P EST LOW 20 MIN: CPT | Performed by: NURSE PRACTITIONER

## 2022-01-11 NOTE — PROGRESS NOTES
"    Chief complaint:   Chief Complaint   Patient presents with   • Neck Pain     Erick returns for follow up         Subjective     HPI: This is a 66-year-old male gentleman who we have been following for neck pain and left greater than right upper extremity pain and numbness and tingling.  The patient was set up to do surgery earlier this month however his lab work did come back abnormal and his primary care doctor have been working to try and get this corrected.  He had also been dealing with an infection in his foot and his podiatrist has been working to try and get this cleared up.  I did speak to Dr. Charley Gomes in regards to this patient and she states that she is planning to take the patient to the operating room on January 13 for further debridement and Dr. Gomes did state that he does have osteomyelitis.  He has received an IV antibiotic with plans to repeat the dosing of the antibiotic in a few weeks as well.  The patient continues to complain of neck pain and pain going into his arms bilaterally.    Review of Systems   Musculoskeletal: Positive for arthralgias, myalgias and neck pain.   Skin:        Left foot wound   Neurological: Positive for numbness.   Psychiatric/Behavioral: Negative.          Objective      Vital Signs  /72   Ht 182.9 cm (72\")   Wt (!) 160 kg (352 lb)   BMI 47.74 kg/m²     Physical Exam  Constitutional:       Appearance: He is well-developed.   HENT:      Head: Normocephalic.   Eyes:      Extraocular Movements: EOM normal.      Pupils: Pupils are equal, round, and reactive to light.   Pulmonary:      Effort: Pulmonary effort is normal.   Musculoskeletal:         General: Normal range of motion.      Cervical back: Normal range of motion.      Comments: Neck pain   Skin:     General: Skin is warm.      Comments: Left foot wound   Neurological:      Mental Status: He is alert and oriented to person, place, and time.      GCS: GCS eye subscore is 4. GCS verbal subscore is " 5. GCS motor subscore is 6.      Cranial Nerves: No cranial nerve deficit.      Sensory: No sensory deficit.      Gait: Gait is intact. Gait normal.      Deep Tendon Reflexes: Strength normal and reflexes are normal and symmetric.   Psychiatric:         Speech: Speech normal.         Behavior: Behavior normal.         Thought Content: Thought content normal.         Neurologic Exam     Mental Status   Oriented to person, place, and time.   Attention: normal. Concentration: normal.   Speech: speech is normal   Level of consciousness: alert  Normal comprehension.     Cranial Nerves     CN II   Visual fields full to confrontation.     CN III, IV, VI   Pupils are equal, round, and reactive to light.  Extraocular motions are normal.     CN V   Facial sensation intact.     CN VII   Facial expression full, symmetric.     CN VIII   CN VIII normal.     CN IX, X   CN IX normal.   CN X normal.     CN XI   CN XI normal.     CN XII   CN XII normal.     Motor Exam   Muscle bulk: normal    Strength   Strength 5/5 throughout.     Sensory Exam   Light touch normal.     Gait, Coordination, and Reflexes     Gait  Gait: normal    Reflexes   Reflexes 2+ except as noted.       Imaging review: No new imaging        Assessment/Plan: The patient does have cervical stenosis however this is currently complicated by the fact that he does have osteomyelitis in his left foot.  He is being treated with antibiotics and plan for surgical debridement by his podiatrist.  We will plan to follow-up with the patient in 4 to 6 weeks and make further recommendations at that time depending on how he is doing from an infection standpoint.  They acknowledge understanding.  Their questions and concerns were addressed    Patient is a nonsmoker  The patient's Body mass index is 47.74 kg/m².. BMI is above normal parameters. Recommendations include: educational material and nutrition counseling  Advance Care Planning   ACP discussion was held with the patient  during this visit. Patient does not have an advance directive, information provided.     Diagnoses and all orders for this visit:    1. Spinal stenosis in cervical region (Primary)    2. Degeneration of cervical intervertebral disc    3. Cervical radiculopathy    4. Class 3 severe obesity due to excess calories with serious comorbidity and body mass index (BMI) of 45.0 to 49.9 in adult (HCC)    5. Nonsmoker        I discussed the patients findings and my recommendations with patient  Willy Romero, APRN  01/11/22  08:57 CST

## 2022-01-11 NOTE — PATIENT INSTRUCTIONS
Advance Care Planning and Advance Directives     You make decisions on a daily basis - decisions about where you want to live, your career, your home, your life. Perhaps one of the most important decisions you face is your choice for future medical care. Take time to talk with your family and your healthcare team and start planning today.  Advance Care Planning is a process that can help you:  · Understand possible future healthcare decisions in light of your own experiences  · Reflect on those decision in light of your goals and values  · Discuss your decisions with those closest to you and the healthcare professionals that care for you  · Make a plan by creating a document that reflects your wishes    Surrogate Decision Maker  In the event of a medical emergency, which has left you unable to communicate or to make your own decisions, you would need someone to make decisions for you.  It is important to discuss your preferences for medical treatment with this person while you are in good health.     Qualities of a surrogate decision maker:  • Willing to take on this role and responsibility  • Knows what you want for future medical care  • Willing to follow your wishes even if they don't agree with them  • Able to make difficult medical decisions under stressful circumstances    Advance Directives  These are legal documents you can create that will guide your healthcare team and decision maker(s) when needed. These documents can be stored in the electronic medical record.    · Living Will - a legal document to guide your care if you have a terminal condition or a serious illness and are unable to communicate. States vary by statute in document names/types, but most forms may include one or more of the following:        -  Directions regarding life-prolonging treatments        -  Directions regarding artificially provided nutrition/hydration        -  Choosing a healthcare decision maker        -  Direction  regarding organ/tissue donation    · Durable Power of  for Healthcare - this document names an -in-fact to make medical decisions for you, but it may also allow this person to make personal and financial decisions for you. Please seek the advice of an  if you need this type of document.    **Advance Directives are not required and no one may discriminate against you if you do not sign one.    Medical Orders  Many states allow specific forms/orders signed by your physician to record your wishes for medical treatment in your current state of health. This form, signed in personal communication with your physician, addresses resuscitation and other medical interventions that you may or may not want.      For more information or to schedule a time with a [facility] Advance Care Planning Facilitator contact: [facility] or call [phone] and someone will contact you directly.    BMI for Adults  What is BMI?  Body mass index (BMI) is a number that is calculated from a person's weight and height. BMI can help estimate how much of a person's weight is composed of fat. BMI does not measure body fat directly. Rather, it is an alternative to procedures that directly measure body fat, which can be difficult and expensive.  BMI can help identify people who may be at higher risk for certain medical problems.  What are BMI measurements used for?  BMI is used as a screening tool to identify possible weight problems. It helps determine whether a person is obese, overweight, a healthy weight, or underweight.  BMI is useful for:  · Identifying a weight problem that may be related to a medical condition or may increase the risk for medical problems.  · Promoting changes, such as changes in diet and exercise, to help reach a healthy weight. BMI screening can be repeated to see if these changes are working.  How is BMI calculated?  BMI involves measuring your weight in relation to your height. Both  "height and weight are measured, and the BMI is calculated from those numbers. This can be done either in English (U[place] or metric measurements. Note that charts and online BMI calculators are available to help you find your BMI quickly and easily without having to do these calculations yourself.  To calculate your BMI in English ([place] measurements:    1. Measure your weight in pounds (lb).  2. Multiply the number of pounds by 703.  ? For example, for a person who weighs 180 lb, multiply that number by 703, which equals 126,540.  3. Measure your height in inches. Then multiply that number by itself to get a measurement called \"inches squared.\"  ? For example, for a person who is 70 inches tall, the \"inches squared\" measurement is 70 inches x 70 inches, which equals 4,900 inches squared.  4. Divide the total from step 2 (number of lb x 703) by the total from step 3 (inches squared): 126,540 ÷ 4,900 = 25.8. This is your BMI.    To calculate your BMI in metric measurements:  1. Measure your weight in kilograms (kg).  2. Measure your height in meters (m). Then multiply that number by itself to get a measurement called \"meters squared.\"  ? For example, for a person who is 1.75 m tall, the \"meters squared\" measurement is 1.75 m x 1.75 m, which is equal to 3.1 meters squared.  3. Divide the number of kilograms (your weight) by the meters squared number. In this example: 70 ÷ 3.1 = 22.6. This is your BMI.  What do the results mean?  BMI charts are used to identify whether you are underweight, normal weight, overweight, or obese. The following guidelines will be used:  · Underweight: BMI less than 18.5.  · Normal weight: BMI between 18.5 and 24.9.  · Overweight: BMI between 25 and 29.9.  · Obese: BMI of 30 or above.  Keep these notes in mind:  · Weight includes both fat and muscle, so someone with a muscular build, such as an athlete, may have a BMI that is higher than 24.9. In cases like these, BMI is not an accurate " "measure of body fat.  · To determine if excess body fat is the cause of a BMI of 25 or higher, further assessments may need to be done by a health care provider.  · BMI is usually interpreted in the same way for men and women.  Where to find more information  For more information about BMI, including tools to quickly calculate your BMI, go to these websites:  · Centers for Disease Control and Prevention: www.cdc.gov  · American Heart Association: www.heart.org  · National Heart, Lung, and Blood [place]: www.nhlbi.nih.gov  Summary  · Body mass index (BMI) is a number that is calculated from a person's weight and height.  · BMI may help estimate how much of a person's weight is composed of fat. BMI can help identify those who may be at higher risk for certain medical problems.  · BMI can be measured using English measurements or metric measurements.  · BMI charts are used to identify whether you are underweight, normal weight, overweight, or obese.  This information is not intended to replace advice given to you by your health care provider. Make sure you discuss any questions you have with your health care provider.  Document Revised: 09/09/2020 Document Reviewed: 07/17/2020  [employer] Patient Education © 2021 [employer]      https://www.nhlbi.nih.gov/files/docs/public/heart/dash_brief.pdf\">   DASH Eating Plan  DASH stands for Dietary Approaches to Stop Hypertension. The DASH eating plan is a healthy eating plan that has been shown to:  · Reduce high blood pressure (hypertension).  · Reduce your risk for type 2 diabetes, heart disease, and stroke.  · Help with weight loss.  What are tips for following this plan?  Reading food labels  · Check food labels for the amount of salt (sodium) per serving. Choose foods with less than 5 percent of the Daily Value of sodium. Generally, foods with less than 300 milligrams (mg) of sodium per serving fit into this eating plan.  · To find whole grains, look for the word \"whole\" " "as the first word in the ingredient list.  Shopping  · Buy products labeled as \"low-sodium\" or \"no salt added.\"  · Buy fresh foods. Avoid canned foods and pre-made or frozen meals.  Cooking  · Avoid adding salt when cooking. Use salt-free seasonings or herbs instead of table salt or sea salt. Check with your health care provider or pharmacist before using salt substitutes.  · Do not vera foods. Cook foods using healthy methods such as baking, boiling, grilling, roasting, and broiling instead.  · Cook with heart-healthy oils, such as olive, canola, avocado, soybean, or sunflower oil.  Meal planning    · Eat a balanced diet that includes:  ? 4 or more servings of fruits and 4 or more servings of vegetables each day. Try to fill one-half of your plate with fruits and vegetables.  ? 6-8 servings of whole grains each day.  ? Less than 6 oz (170 g) of lean meat, poultry, or fish each day. A 3-oz (85-g) serving of meat is about the same size as a deck of cards. One egg equals 1 oz (28 g).  ? 2-3 servings of low-fat dairy each day. One serving is 1 cup (237 mL).  ? 1 serving of nuts, seeds, or beans 5 times each week.  ? 2-3 servings of heart-healthy fats. Healthy fats called omega-3 fatty acids are found in foods such as walnuts, flaxseeds, fortified milks, and eggs. These fats are also found in cold-water fish, such as sardines, salmon, and mackerel.  · Limit how much you eat of:  ? Canned or prepackaged foods.  ? Food that is high in trans fat, such as some fried foods.  ? Food that is high in saturated fat, such as fatty meat.  ? Desserts and other sweets, sugary drinks, and other foods with added sugar.  ? Full-fat dairy products.  · Do not salt foods before eating.  · Do not eat more than 4 egg yolks a week.  · Try to eat at least 2 vegetarian meals a week.  · Eat more home-cooked food and less restaurant, buffet, and fast food.    Lifestyle  · When eating at a restaurant, ask that your food be prepared with less salt " or no salt, if possible.  · If you drink alcohol:  ? Limit how much you use to:  § 0-1 drink a day for women who are not pregnant.  § 0-2 drinks a day for men.  ? Be aware of how much alcohol is in your drink. In the [place] one drink equals one 12 oz bottle of beer (355 mL), one 5 oz glass of wine (148 mL), or one 1½ oz glass of hard liquor (44 mL).  General information  · Avoid eating more than 2,300 mg of salt a day. If you have hypertension, you may need to reduce your sodium intake to 1,500 mg a day.  · Work with your health care provider to maintain a healthy body weight or to lose weight. Ask what an ideal weight is for you.  · Get at least 30 minutes of exercise that causes your heart to beat faster (aerobic exercise) most days of the week. Activities may include walking, swimming, or biking.  · Work with your health care provider or dietitian to adjust your eating plan to your individual calorie needs.  What foods should I eat?  Fruits  All fresh, dried, or frozen fruit. Canned fruit in natural juice (without added sugar).  Vegetables  Fresh or frozen vegetables (raw, steamed, roasted, or grilled). Low-sodium or reduced-sodium tomato and vegetable juice. Low-sodium or reduced-sodium tomato sauce and tomato paste. Low-sodium or reduced-sodium canned vegetables.  Grains  Whole-grain or whole-wheat bread. Whole-grain or whole-wheat pasta. Brown rice. Oatmeal. Quinoa. Bulgur. Whole-grain and low-sodium cereals. Radha bread. Low-fat, low-sodium crackers. Whole-wheat flour tortillas.  Meats and other proteins  Skinless chicken or turkey. Ground chicken or turkey. Pork with fat trimmed off. Fish and seafood. Egg whites. Dried beans, peas, or lentils. Unsalted nuts, nut butters, and seeds. Unsalted canned beans. Lean cuts of beef with fat trimmed off. Low-sodium, lean precooked or cured meat, such as sausages or meat loaves.  Dairy  Low-fat (1%) or fat-free (skim) milk. Reduced-fat, low-fat, or fat-free cheeses.  Nonfat, low-sodium ricotta or cottage cheese. Low-fat or nonfat yogurt. Low-fat, low-sodium cheese.  Fats and oils  Soft margarine without trans fats. Vegetable oil. Reduced-fat, low-fat, or light mayonnaise and salad dressings (reduced-sodium). Canola, safflower, olive, avocado, soybean, and sunflower oils. Avocado.  Seasonings and condiments  Herbs. Spices. Seasoning mixes without salt.  Other foods  Unsalted popcorn and pretzels. Fat-free sweets.  The items listed above may not be a complete list of foods and beverages you can eat. Contact a dietitian for more information.  What foods should I avoid?  Fruits  Canned fruit in a light or heavy syrup. Fried fruit. Fruit in cream or butter sauce.  Vegetables  Creamed or fried vegetables. Vegetables in a cheese sauce. Regular canned vegetables (not low-sodium or reduced-sodium). Regular canned tomato sauce and paste (not low-sodium or reduced-sodium). Regular tomato and vegetable juice (not low-sodium or reduced-sodium). Pickles. Olives.  Grains  Baked goods made with fat, such as croissants, muffins, or some breads. Dry pasta or rice meal packs.  Meats and other proteins  Fatty cuts of meat. Ribs. Fried meat. Morfin. Bologna, salami, and other precooked or cured meats, such as sausages or meat loaves. Fat from the back of a pig (fatback). Bratwurst. Salted nuts and seeds. Canned beans with added salt. Canned or smoked fish. Whole eggs or egg yolks. Chicken or turkey with skin.  Dairy  Whole or 2% milk, cream, and half-and-half. Whole or full-fat cream cheese. Whole-fat or sweetened yogurt. Full-fat cheese. Nondairy creamers. Whipped toppings. Processed cheese and cheese spreads.  Fats and oils  Butter. Stick margarine. Lard. Shortening. Ghee. Morfin fat. Tropical oils, such as coconut, palm kernel, or palm oil.  Seasonings and condiments  Onion salt, garlic salt, seasoned salt, table salt, and sea salt. Worcestershire sauce. Tartar sauce. Barbecu sauce. Nata  sauce. Soy sauce, including reduced-sodium. Steak sauce. Canned and packaged gravies. Fish sauce. Oyster sauce. Cocktail sauce. Store-bought horseradish. Ketchup. Mustard. Meat flavorings and tenderizers. Bouillon cubes. Hot sauces. Pre-made or packaged marinades. Pre-made or packaged taco seasonings. Relishes. Regular salad dressings.  Other foods  Salted popcorn and pretzels.  The items listed above may not be a complete list of foods and beverages you should avoid. Contact a dietitian for more information.  Where to find more information  · National Heart, Lung, and Blood [place]: www.nhlbi.nih.gov  · American Heart Association: www.heart.org  · Academy of Nutrition and Dietetics: www.eatright.org  · National Kidney Foundation: www.kidney.org  Summary  · The DASH eating plan is a healthy eating plan that has been shown to reduce high blood pressure (hypertension). It may also reduce your risk for type 2 diabetes, heart disease, and stroke.  · When on the DASH eating plan, aim to eat more fresh fruits and vegetables, whole grains, lean proteins, low-fat dairy, and heart-healthy fats.  · With the DASH eating plan, you should limit salt (sodium) intake to 2,300 mg a day. If you have hypertension, you may need to reduce your sodium intake to 1,500 mg a day.  · Work with your health care provider or dietitian to adjust your eating plan to your individual calorie needs.  This information is not intended to replace advice given to you by your health care provider. Make sure you discuss any questions you have with your health care provider.  Document Revised: 11/20/2020 Document Reviewed: 11/20/2020  Elsevier Patient Education © 2021 [employer].

## 2022-01-13 ENCOUNTER — HOME HEALTH ADMISSION (OUTPATIENT)
Dept: HOME HEALTH SERVICES | Facility: HOME HEALTHCARE | Age: 67
End: 2022-01-13

## 2022-01-13 ENCOUNTER — TRANSCRIBE ORDERS (OUTPATIENT)
Dept: ADMINISTRATIVE | Facility: HOSPITAL | Age: 67
End: 2022-01-13

## 2022-01-13 ENCOUNTER — TRANSCRIBE ORDERS (OUTPATIENT)
Dept: HOME HEALTH SERVICES | Facility: HOME HEALTHCARE | Age: 67
End: 2022-01-13

## 2022-01-13 DIAGNOSIS — I50.32 CHRONIC DIASTOLIC CONGESTIVE HEART FAILURE: Primary | ICD-10-CM

## 2022-01-13 DIAGNOSIS — M86.9 OSTEOMYELITIS OF OTHER SITE, UNSPECIFIED TYPE: Primary | ICD-10-CM

## 2022-01-17 ENCOUNTER — LAB REQUISITION (OUTPATIENT)
Dept: LAB | Facility: HOSPITAL | Age: 67
End: 2022-01-17

## 2022-01-17 ENCOUNTER — HOME CARE VISIT (OUTPATIENT)
Dept: HOME HEALTH SERVICES | Facility: CLINIC | Age: 67
End: 2022-01-17

## 2022-01-17 DIAGNOSIS — E11.621 TYPE 2 DIABETES MELLITUS WITH FOOT ULCER (CODE): ICD-10-CM

## 2022-01-17 LAB
ALBUMIN SERPL-MCNC: 3.8 G/DL (ref 3.5–5.2)
ALBUMIN/GLOB SERPL: 1.1 G/DL
ALP SERPL-CCNC: 124 U/L (ref 39–117)
ALT SERPL W P-5'-P-CCNC: 11 U/L (ref 1–41)
ANION GAP SERPL CALCULATED.3IONS-SCNC: 11 MMOL/L (ref 5–15)
AST SERPL-CCNC: 19 U/L (ref 1–40)
BASOPHILS # BLD AUTO: 0.02 10*3/MM3 (ref 0–0.2)
BASOPHILS NFR BLD AUTO: 0.2 % (ref 0–1.5)
BILIRUB SERPL-MCNC: 0.3 MG/DL (ref 0–1.2)
BUN SERPL-MCNC: 56 MG/DL (ref 8–23)
BUN/CREAT SERPL: 24.9 (ref 7–25)
CALCIUM SPEC-SCNC: 8.6 MG/DL (ref 8.6–10.5)
CHLORIDE SERPL-SCNC: 97 MMOL/L (ref 98–107)
CO2 SERPL-SCNC: 27 MMOL/L (ref 22–29)
CREAT SERPL-MCNC: 2.25 MG/DL (ref 0.76–1.27)
CRP SERPL-MCNC: 1.53 MG/DL (ref 0–0.5)
DEPRECATED RDW RBC AUTO: 42.7 FL (ref 37–54)
EOSINOPHIL # BLD AUTO: 0.46 10*3/MM3 (ref 0–0.4)
EOSINOPHIL NFR BLD AUTO: 5.4 % (ref 0.3–6.2)
ERYTHROCYTE [DISTWIDTH] IN BLOOD BY AUTOMATED COUNT: 13.9 % (ref 12.3–15.4)
ERYTHROCYTE [SEDIMENTATION RATE] IN BLOOD: 52 MM/HR (ref 0–20)
GFR SERPL CREATININE-BSD FRML MDRD: 29 ML/MIN/1.73
GLOBULIN UR ELPH-MCNC: 3.5 GM/DL
GLUCOSE SERPL-MCNC: 364 MG/DL (ref 65–99)
HCT VFR BLD AUTO: 26.4 % (ref 37.5–51)
HGB BLD-MCNC: 8.4 G/DL (ref 13–17.7)
IMM GRANULOCYTES # BLD AUTO: 0.06 10*3/MM3 (ref 0–0.05)
IMM GRANULOCYTES NFR BLD AUTO: 0.7 % (ref 0–0.5)
LYMPHOCYTES # BLD AUTO: 1.18 10*3/MM3 (ref 0.7–3.1)
LYMPHOCYTES NFR BLD AUTO: 13.9 % (ref 19.6–45.3)
MCH RBC QN AUTO: 26.6 PG (ref 26.6–33)
MCHC RBC AUTO-ENTMCNC: 31.8 G/DL (ref 31.5–35.7)
MCV RBC AUTO: 83.5 FL (ref 79–97)
MONOCYTES # BLD AUTO: 0.66 10*3/MM3 (ref 0.1–0.9)
MONOCYTES NFR BLD AUTO: 7.8 % (ref 5–12)
NEUTROPHILS NFR BLD AUTO: 6.09 10*3/MM3 (ref 1.7–7)
NEUTROPHILS NFR BLD AUTO: 72 % (ref 42.7–76)
NRBC BLD AUTO-RTO: 0 /100 WBC (ref 0–0.2)
PLATELET # BLD AUTO: 286 10*3/MM3 (ref 140–450)
PMV BLD AUTO: 10.4 FL (ref 6–12)
POTASSIUM SERPL-SCNC: 5.2 MMOL/L (ref 3.5–5.2)
PROT SERPL-MCNC: 7.3 G/DL (ref 6–8.5)
RBC # BLD AUTO: 3.16 10*6/MM3 (ref 4.14–5.8)
SODIUM SERPL-SCNC: 135 MMOL/L (ref 136–145)
WBC NRBC COR # BLD: 8.47 10*3/MM3 (ref 3.4–10.8)

## 2022-01-17 PROCEDURE — 80053 COMPREHEN METABOLIC PANEL: CPT | Performed by: PODIATRIST

## 2022-01-17 PROCEDURE — 85025 COMPLETE CBC W/AUTO DIFF WBC: CPT | Performed by: PODIATRIST

## 2022-01-17 PROCEDURE — 86140 C-REACTIVE PROTEIN: CPT | Performed by: PODIATRIST

## 2022-01-17 PROCEDURE — 82550 ASSAY OF CK (CPK): CPT | Performed by: PODIATRIST

## 2022-01-17 PROCEDURE — 85652 RBC SED RATE AUTOMATED: CPT | Performed by: PODIATRIST

## 2022-01-17 PROCEDURE — G0299 HHS/HOSPICE OF RN EA 15 MIN: HCPCS

## 2022-01-18 VITALS
HEART RATE: 70 BPM | TEMPERATURE: 97.2 F | DIASTOLIC BLOOD PRESSURE: 82 MMHG | OXYGEN SATURATION: 99 % | RESPIRATION RATE: 18 BRPM | SYSTOLIC BLOOD PRESSURE: 152 MMHG

## 2022-01-18 LAB — CK SERPL-CCNC: 92 U/L (ref 20–200)

## 2022-01-19 NOTE — HOME HEALTH
PLAN FOR NEXT VISIT: MIDLINE DRESSING CHANGE WITH LABS AS ORDERED  FOCUS OF CARE/SKILL NEEDED:SN SERVICES NEEDED FOR MID LINE DRESSING CHANGE WEEKLY UNTIL IV ABX ARE DISCONTINUED.PATIENT IS ALERT, UNDERSTANDS SN NEEDS TO BE IN HOME ONCE PER WEEK.PATIENT HAS SPOUSE FOR NIGHTLY CARE AND 2 CHILDREN ARE IN HOME DURING THE DAY TIME.PATIENT AND SPOUSE HAVE AGREED TO TO BE SEEN ONCE PER WEEK PER MD ORDERS. MD AGREES TO POC  TEACHING/INTERVENTIONS:SN EDUCATED PATIENT TO NOT GET MELANY WET WHILE SHOWERING AND TO NOTIFY [facility] IF BIOPATCH BECOMES SOILED, REDNESS AROUND MIDLINE.  HOME SAFETY ISSUES:DIRTLY CLUTTERED ENVIRONMENT  D/C PLAN:WHEN SN SERVICES ARE NO LONGER NEEDED  PHYSICIAN CONTACT:NO  INSURANCE CHANGES:NO  MEDICATION KNOWLEDGE:  Have there been any changes to patient medications?NO  Is pt aware of purpose of each medication?YES  Is pt aware of side effects of each medication?YES  Has the pt had any adverse side effects to medications?NO

## 2022-01-24 ENCOUNTER — HOME CARE VISIT (OUTPATIENT)
Dept: HOME HEALTH SERVICES | Facility: CLINIC | Age: 67
End: 2022-01-24

## 2022-01-24 ENCOUNTER — LAB REQUISITION (OUTPATIENT)
Dept: LAB | Facility: HOSPITAL | Age: 67
End: 2022-01-24

## 2022-01-24 DIAGNOSIS — Z00.00 ENCOUNTER FOR GENERAL ADULT MEDICAL EXAMINATION WITHOUT ABNORMAL FINDINGS: ICD-10-CM

## 2022-01-24 LAB
ALBUMIN SERPL-MCNC: 4 G/DL (ref 3.5–5.2)
ALBUMIN/GLOB SERPL: 1.1 G/DL
ALP SERPL-CCNC: 123 U/L (ref 39–117)
ALT SERPL W P-5'-P-CCNC: 12 U/L (ref 1–41)
ANION GAP SERPL CALCULATED.3IONS-SCNC: 10 MMOL/L (ref 5–15)
AST SERPL-CCNC: 14 U/L (ref 1–40)
BASOPHILS # BLD AUTO: 0.02 10*3/MM3 (ref 0–0.2)
BASOPHILS NFR BLD AUTO: 0.3 % (ref 0–1.5)
BILIRUB SERPL-MCNC: 0.4 MG/DL (ref 0–1.2)
BUN SERPL-MCNC: 45 MG/DL (ref 8–23)
BUN/CREAT SERPL: 18.9 (ref 7–25)
CALCIUM SPEC-SCNC: 9.4 MG/DL (ref 8.6–10.5)
CHLORIDE SERPL-SCNC: 99 MMOL/L (ref 98–107)
CK SERPL-CCNC: 85 U/L (ref 20–200)
CO2 SERPL-SCNC: 28 MMOL/L (ref 22–29)
CREAT SERPL-MCNC: 2.38 MG/DL (ref 0.76–1.27)
CRP SERPL-MCNC: 1.19 MG/DL (ref 0–0.5)
DEPRECATED RDW RBC AUTO: 43.9 FL (ref 37–54)
EOSINOPHIL # BLD AUTO: 0.29 10*3/MM3 (ref 0–0.4)
EOSINOPHIL NFR BLD AUTO: 3.7 % (ref 0.3–6.2)
ERYTHROCYTE [DISTWIDTH] IN BLOOD BY AUTOMATED COUNT: 14.3 % (ref 12.3–15.4)
ERYTHROCYTE [SEDIMENTATION RATE] IN BLOOD: 55 MM/HR (ref 0–20)
GFR SERPL CREATININE-BSD FRML MDRD: 28 ML/MIN/1.73
GLOBULIN UR ELPH-MCNC: 3.8 GM/DL
GLUCOSE SERPL-MCNC: 274 MG/DL (ref 65–99)
HCT VFR BLD AUTO: 28.8 % (ref 37.5–51)
HGB BLD-MCNC: 9.1 G/DL (ref 13–17.7)
IMM GRANULOCYTES # BLD AUTO: 0.04 10*3/MM3 (ref 0–0.05)
IMM GRANULOCYTES NFR BLD AUTO: 0.5 % (ref 0–0.5)
LYMPHOCYTES # BLD AUTO: 1.45 10*3/MM3 (ref 0.7–3.1)
LYMPHOCYTES NFR BLD AUTO: 18.7 % (ref 19.6–45.3)
MCH RBC QN AUTO: 26.8 PG (ref 26.6–33)
MCHC RBC AUTO-ENTMCNC: 31.6 G/DL (ref 31.5–35.7)
MCV RBC AUTO: 85 FL (ref 79–97)
MONOCYTES # BLD AUTO: 0.75 10*3/MM3 (ref 0.1–0.9)
MONOCYTES NFR BLD AUTO: 9.7 % (ref 5–12)
NEUTROPHILS NFR BLD AUTO: 5.19 10*3/MM3 (ref 1.7–7)
NEUTROPHILS NFR BLD AUTO: 67.1 % (ref 42.7–76)
NRBC BLD AUTO-RTO: 0 /100 WBC (ref 0–0.2)
PLATELET # BLD AUTO: 239 10*3/MM3 (ref 140–450)
PMV BLD AUTO: 11.1 FL (ref 6–12)
POTASSIUM SERPL-SCNC: 5.2 MMOL/L (ref 3.5–5.2)
PROT SERPL-MCNC: 7.8 G/DL (ref 6–8.5)
RBC # BLD AUTO: 3.39 10*6/MM3 (ref 4.14–5.8)
SODIUM SERPL-SCNC: 137 MMOL/L (ref 136–145)
WBC NRBC COR # BLD: 7.74 10*3/MM3 (ref 3.4–10.8)

## 2022-01-24 PROCEDURE — G0299 HHS/HOSPICE OF RN EA 15 MIN: HCPCS

## 2022-01-24 PROCEDURE — 85025 COMPLETE CBC W/AUTO DIFF WBC: CPT | Performed by: PODIATRIST

## 2022-01-24 PROCEDURE — 80053 COMPREHEN METABOLIC PANEL: CPT | Performed by: PODIATRIST

## 2022-01-24 PROCEDURE — 82550 ASSAY OF CK (CPK): CPT | Performed by: PODIATRIST

## 2022-01-24 PROCEDURE — 86140 C-REACTIVE PROTEIN: CPT | Performed by: PODIATRIST

## 2022-01-24 PROCEDURE — 85652 RBC SED RATE AUTOMATED: CPT | Performed by: PODIATRIST

## 2022-01-25 ENCOUNTER — HOME CARE VISIT (OUTPATIENT)
Dept: HOME HEALTH SERVICES | Facility: CLINIC | Age: 67
End: 2022-01-25

## 2022-01-25 PROCEDURE — G0299 HHS/HOSPICE OF RN EA 15 MIN: HCPCS

## 2022-01-26 VITALS
TEMPERATURE: 97.4 F | RESPIRATION RATE: 18 BRPM | HEART RATE: 67 BPM | DIASTOLIC BLOOD PRESSURE: 72 MMHG | SYSTOLIC BLOOD PRESSURE: 148 MMHG

## 2022-01-26 NOTE — HOME HEALTH
PLAN FOR NEXT VISIT: LABS AND DRESSING CHANGE TO MELANY IF LINE STILL PLACED  FOCUS OF CARE/SKILL NEEDED:SN SERVICES NEEDED FOR WEEKLY MID LINE DRESSING CHANGES WITH LABS  TEACHING/INTERVENTIONS:SN EDUCATED PATIENT TO NOT GET MELANY WET WHILES SHOWERING.PATIENT V/U UTILIZING TEACHBACK METHOD  HOME SAFETY ISSUES:EXTREMELY CLUTTERED ENVIRONMENT  D/C PLAN:WHEN SN SERVICES ARE NO LONGER NEEDED  PHYSICIAN CONTACT:NO  INSURANCE CHANGES:NO  MEDICATION KNOWLEDGE:  Have there been any changes to patient medications?NO  Is pt aware of purpose of each medication?YES  Is pt aware of side effects of each medication?YES  Has the pt had any adverse side effects to medications?NO

## 2022-02-01 ENCOUNTER — HOME CARE VISIT (OUTPATIENT)
Dept: HOME HEALTH SERVICES | Facility: CLINIC | Age: 67
End: 2022-02-01

## 2022-02-01 VITALS
RESPIRATION RATE: 18 BRPM | SYSTOLIC BLOOD PRESSURE: 128 MMHG | TEMPERATURE: 97.1 F | DIASTOLIC BLOOD PRESSURE: 66 MMHG | OXYGEN SATURATION: 99 % | HEART RATE: 60 BPM

## 2022-02-01 PROCEDURE — G0299 HHS/HOSPICE OF RN EA 15 MIN: HCPCS

## 2022-02-07 ENCOUNTER — TELEPHONE (OUTPATIENT)
Dept: NEUROSURGERY | Facility: CLINIC | Age: 67
End: 2022-02-07

## 2022-02-07 ENCOUNTER — LAB (OUTPATIENT)
Dept: LAB | Facility: HOSPITAL | Age: 67
End: 2022-02-07

## 2022-02-07 DIAGNOSIS — I50.32 CHRONIC DIASTOLIC CONGESTIVE HEART FAILURE: ICD-10-CM

## 2022-02-07 DIAGNOSIS — N17.9 ACUTE RENAL FAILURE, UNSPECIFIED ACUTE RENAL FAILURE TYPE: ICD-10-CM

## 2022-02-07 LAB
ANION GAP SERPL CALCULATED.3IONS-SCNC: 13 MMOL/L (ref 5–15)
BASOPHILS # BLD AUTO: 0.06 10*3/MM3 (ref 0–0.2)
BASOPHILS NFR BLD AUTO: 0.6 % (ref 0–1.5)
BUN SERPL-MCNC: 97 MG/DL (ref 8–23)
BUN/CREAT SERPL: 27.6 (ref 7–25)
CALCIUM SPEC-SCNC: 9.6 MG/DL (ref 8.6–10.5)
CHLORIDE SERPL-SCNC: 94 MMOL/L (ref 98–107)
CO2 SERPL-SCNC: 31 MMOL/L (ref 22–29)
CREAT SERPL-MCNC: 3.51 MG/DL (ref 0.76–1.27)
DEPRECATED RDW RBC AUTO: 46.5 FL (ref 37–54)
EOSINOPHIL # BLD AUTO: 0.47 10*3/MM3 (ref 0–0.4)
EOSINOPHIL NFR BLD AUTO: 4.6 % (ref 0.3–6.2)
ERYTHROCYTE [DISTWIDTH] IN BLOOD BY AUTOMATED COUNT: 14.8 % (ref 12.3–15.4)
GFR SERPL CREATININE-BSD FRML MDRD: 18 ML/MIN/1.73
GLUCOSE SERPL-MCNC: 153 MG/DL (ref 65–99)
HCT VFR BLD AUTO: 32.1 % (ref 37.5–51)
HGB BLD-MCNC: 10.2 G/DL (ref 13–17.7)
IMM GRANULOCYTES # BLD AUTO: 0.08 10*3/MM3 (ref 0–0.05)
IMM GRANULOCYTES NFR BLD AUTO: 0.8 % (ref 0–0.5)
LYMPHOCYTES # BLD AUTO: 1.91 10*3/MM3 (ref 0.7–3.1)
LYMPHOCYTES NFR BLD AUTO: 18.7 % (ref 19.6–45.3)
MCH RBC QN AUTO: 27.3 PG (ref 26.6–33)
MCHC RBC AUTO-ENTMCNC: 31.8 G/DL (ref 31.5–35.7)
MCV RBC AUTO: 85.8 FL (ref 79–97)
MONOCYTES # BLD AUTO: 1.02 10*3/MM3 (ref 0.1–0.9)
MONOCYTES NFR BLD AUTO: 10 % (ref 5–12)
NEUTROPHILS NFR BLD AUTO: 6.65 10*3/MM3 (ref 1.7–7)
NEUTROPHILS NFR BLD AUTO: 65.3 % (ref 42.7–76)
NRBC BLD AUTO-RTO: 0 /100 WBC (ref 0–0.2)
PLATELET # BLD AUTO: 215 10*3/MM3 (ref 140–450)
PMV BLD AUTO: 10.2 FL (ref 6–12)
POTASSIUM SERPL-SCNC: 5.1 MMOL/L (ref 3.5–5.2)
RBC # BLD AUTO: 3.74 10*6/MM3 (ref 4.14–5.8)
SODIUM SERPL-SCNC: 138 MMOL/L (ref 136–145)
WBC NRBC COR # BLD: 10.19 10*3/MM3 (ref 3.4–10.8)

## 2022-02-07 PROCEDURE — 36415 COLL VENOUS BLD VENIPUNCTURE: CPT

## 2022-02-07 PROCEDURE — 85025 COMPLETE CBC W/AUTO DIFF WBC: CPT

## 2022-02-07 PROCEDURE — 80048 BASIC METABOLIC PNL TOTAL CA: CPT

## 2022-02-07 NOTE — TELEPHONE ENCOUNTER
Patient's wife called and wants to make sure the surgery for 2/9/22 is cancelled.  She says they were told by Willy on his last visit that they would come back and be re-evaluated.  She says they got a call today to confirm his surgery.  I will forward this to Deborah so she can put the surgery in the depot for now.      evy fraser CMA

## 2022-02-08 ENCOUNTER — TRANSCRIBE ORDERS (OUTPATIENT)
Dept: ADMINISTRATIVE | Facility: HOSPITAL | Age: 67
End: 2022-02-08

## 2022-02-10 ENCOUNTER — HOME CARE VISIT (OUTPATIENT)
Dept: HOME HEALTH SERVICES | Facility: CLINIC | Age: 67
End: 2022-02-10

## 2022-02-10 NOTE — HOME HEALTH
PATIENT MIDLINE REMOVED AND IV ANTIBIOTIC FINISHED. PER DR. BOONE, SN TO CONTINUE WEEKLY ASSESS AND DOCUMENTATION OF WOUND TO LEFT FOOT. PT AND CG INSTRUCTED ON IMPORTANCE OF SN TO VISIT WEEKLY TO ASSESS WOUND WITH V/U.

## 2022-02-15 ENCOUNTER — TELEPHONE (OUTPATIENT)
Dept: GASTROENTEROLOGY | Facility: CLINIC | Age: 67
End: 2022-02-15

## 2022-02-17 ENCOUNTER — HOME CARE VISIT (OUTPATIENT)
Dept: HOME HEALTH SERVICES | Facility: CLINIC | Age: 67
End: 2022-02-17

## 2022-02-17 VITALS
OXYGEN SATURATION: 98 % | DIASTOLIC BLOOD PRESSURE: 82 MMHG | RESPIRATION RATE: 18 BRPM | TEMPERATURE: 97.3 F | SYSTOLIC BLOOD PRESSURE: 158 MMHG | HEART RATE: 81 BPM

## 2022-02-17 PROCEDURE — G0299 HHS/HOSPICE OF RN EA 15 MIN: HCPCS

## 2022-02-17 NOTE — HOME HEALTH
Patient agreeable to homecare discharge. Discharge instructions reviewed and signed by the pt. Medication reconciliation completed and no issues found. No recent falls and no changes to insurance. Pt had no further questions or concerns regarding the pt's medications or discharge. A&O X 4. VSS. Pt c/o neck pain. SN goals met, and the pt is also no longer homebound.

## 2022-03-01 ENCOUNTER — OFFICE VISIT (OUTPATIENT)
Dept: NEUROSURGERY | Facility: CLINIC | Age: 67
End: 2022-03-01

## 2022-03-01 VITALS
DIASTOLIC BLOOD PRESSURE: 70 MMHG | SYSTOLIC BLOOD PRESSURE: 122 MMHG | BODY MASS INDEX: 42.66 KG/M2 | WEIGHT: 315 LBS | HEIGHT: 72 IN

## 2022-03-01 DIAGNOSIS — M50.30 DEGENERATION OF CERVICAL INTERVERTEBRAL DISC: Primary | ICD-10-CM

## 2022-03-01 DIAGNOSIS — M48.02 SPINAL STENOSIS IN CERVICAL REGION: ICD-10-CM

## 2022-03-01 DIAGNOSIS — E66.01 CLASS 3 SEVERE OBESITY DUE TO EXCESS CALORIES WITH BODY MASS INDEX (BMI) OF 45.0 TO 49.9 IN ADULT, UNSPECIFIED WHETHER SERIOUS COMORBIDITY PRESENT: ICD-10-CM

## 2022-03-01 DIAGNOSIS — M54.12 CERVICAL RADICULOPATHY: ICD-10-CM

## 2022-03-01 DIAGNOSIS — Z78.9 NONSMOKER: ICD-10-CM

## 2022-03-01 PROCEDURE — 99213 OFFICE O/P EST LOW 20 MIN: CPT | Performed by: NURSE PRACTITIONER

## 2022-03-01 NOTE — PROGRESS NOTES
Chief complaint:   Chief Complaint   Patient presents with   • Neck Pain     Erick returns today to discuss his continued neck pain with bilateral arm pain, worse on the left.        Subjective     HPI: This is a 66-year-old male gentleman who we have been following for neck pain and bilateral upper extremity pain with the left being worse than the right along with numbness and tingling.  The patient was set up for surgery however he did have some abnormal lab work and was also dealing with an infection in his foot.  He comes in today for further evaluation.  He states that he did have the surgery on his foot and that he has been making progress with the infection.  He just finished his antibiotics yesterday.  His wound on his foot has been healing and according to the podiatrist should be healed in a couple weeks.  He has also been working with nephrology in regard to his kidney function and they do feel like it is improving at this time.  The patient continues to complain of neck pain and pain going into his arms bilaterally with the left being worse than the right.  He also does complain of numbness and tingling in his bilateral arms.  Rates his pain on a scale 0-10 at a 8.  He says it does interfere with his actives of daily living    Review of Systems   Musculoskeletal: Positive for neck pain.   Skin: Positive for wound.   Neurological: Positive for weakness and numbness.        Past Medical History:   Diagnosis Date   • Arthritis    • Autonomic disease    • CAD (coronary artery disease) 2/6/2017   • Cervical radiculopathy 9/16/2021   • Chronic constipation with acute exaccerbation 5/10/2021   • Coronary artery disease    • Degeneration of cervical intervertebral disc 8/11/2021   • Diabetes mellitus (HCC)    • Diabetic foot ulcer (HCC) 8/31/2020   • Diabetic polyneuropathy associated with type 2 diabetes mellitus (HCC) 1/18/2021   • Gastroesophageal reflux disease 5/13/2019   • HTN (hypertension), benign  5/3/2017   • Hyperlipidemia    • Hypertension    • Mixed hyperlipidemia 2/7/2017   • Multiple lung nodules 1/26/2020    5mm, 9 mm RLL identified 1/2020, not present 10/2019.   • Myocardial infarction (HCC)    • Osteomyelitis (HCC) 1/22/2020   • Osteomyelitis of fifth toe of right foot (HCC) 10/7/2019   • Pancreatitis    • Persistent insomnia 1/20/2020   • Renal disorder    • Sleep apnea 2/6/2017   • Sleep apnea with use of continuous positive airway pressure (CPAP)    • Slow transit constipation 1/16/2019   • Spinal stenosis in cervical region 9/16/2021   • Vitamin D deficiency 3/2/2021     Past Surgical History:   Procedure Laterality Date   • ABDOMINAL SURGERY     • AMPUTATION FOOT / TOE Left 10/2021    5th digit    • APPENDECTOMY     • BACK SURGERY     • CARDIAC CATHETERIZATION Left 2/8/2021    Procedure: Left Heart Cath w poss intervention left anatomical snuff box acess;  Surgeon: Omkar Charles DO;  Location: [facility] CATH INVASIVE LOCATION;  Service: Cardiology;  Laterality: Left;   • CARDIAC SURGERY     • CATARACT EXTRACTION     • CERVICAL SPINE SURGERY     • COLONOSCOPY N/A 1/31/2017    Normal exam repeat in 5 years   • COLONOSCOPY N/A 2/11/2019    Mild acute inflammation   • COLONOSCOPY W/ POLYPECTOMY  03/04/2014    Hyperplastic polyp   • CORONARY ARTERY BYPASS GRAFT  10/2015   • ENDOSCOPY  04/13/2011    Gastritis with hemorrhage   • ENDOSCOPY N/A 5/5/2017    Normal exam   • ENDOSCOPY N/A 2/11/2019    Gastritis   • ENDOSCOPY N/A 9/1/2020    Non-erosive gastritis with hemorrhage   • ENDOSCOPY N/A 2/10/2021    Esophagitis   • INCISION AND DRAINAGE OF WOUND Left 09/2007    spider bite     Family History   Problem Relation Age of Onset   • Colon cancer Father    • Heart disease Father    • Colon cancer Sister    • Colon polyps Sister    • Alzheimer's disease Mother    • Coronary artery disease Sister    • Coronary artery disease Sister      Social History     Tobacco Use   • Smoking status: Former  "Smoker     Quit date:      Years since quittin.1   • Smokeless tobacco: Never Used   • Tobacco comment: smoked in highschool   Vaping Use   • Vaping Use: Never used   Substance Use Topics   • Alcohol use: No   • Drug use: No     (Not in a hospital admission)    Allergies:  Cefepime, Bactrim [sulfamethoxazole-trimethoprim], Clindamycin, Vancomycin, Zolpidem, and Metronidazole    Objective      Vital Signs  /70   Ht 182.9 cm (72\")   Wt (!) 154 kg (339 lb)   BMI 45.98 kg/m²     Physical Exam  Constitutional:       Appearance: He is well-developed.   HENT:      Head: Normocephalic.   Eyes:      Extraocular Movements: EOM normal.      Pupils: Pupils are equal, round, and reactive to light.   Pulmonary:      Effort: Pulmonary effort is normal.   Musculoskeletal:         General: Normal range of motion.      Cervical back: Normal range of motion.   Skin:     General: Skin is warm.   Neurological:      Mental Status: He is alert and oriented to person, place, and time.      GCS: GCS eye subscore is 4. GCS verbal subscore is 5. GCS motor subscore is 6.      Cranial Nerves: No cranial nerve deficit.      Sensory: No sensory deficit.      Motor: Weakness present.      Gait: Gait is intact. Gait normal.      Deep Tendon Reflexes: Reflexes are normal and symmetric.   Psychiatric:         Speech: Speech normal.         Behavior: Behavior normal.         Thought Content: Thought content normal.         Neurologic Exam     Mental Status   Oriented to person, place, and time.   Attention: normal. Concentration: normal.   Speech: speech is normal   Level of consciousness: alert  Normal comprehension.     Cranial Nerves     CN II   Visual fields full to confrontation.     CN III, IV, VI   Pupils are equal, round, and reactive to light.  Extraocular motions are normal.     CN V   Facial sensation intact.     CN VII   Facial expression full, symmetric.     CN VIII   CN VIII normal.     CN IX, X   CN IX normal.   CN X " normal.     CN XI   CN XI normal.     CN XII   CN XII normal.     Motor Exam   Muscle bulk: normal    Strength   Strength 5/5 except as noted. Left  and tricep 4 out of 5     Sensory Exam   Light touch normal.     Gait, Coordination, and Reflexes     Gait  Gait: normal    Reflexes   Reflexes 2+ except as noted.     Imaging review: No imaging        Assessment/Plan: Patient is making progress with his infection.  He just finished his antibiotics.  We will see the patient back in the office in 1 month to make sure that there is no relapse in the infection and give his kidneys time to recover from all the antibiotics.  He states that he does see Dr. Lomas on April 5th.  They were told to call us if any changes occurred.  If everything looks good we can consider booking his surgery at the next visit  I advised the patient to call and return sooner for new or worsening complaints of weakness, paresthesias, gait disturbances, or any additional concerns.  Treatment options discussed in detail with Erick and the patient voiced understanding.  Mr. Luong agrees with this plan of care.     Patient is a nonsmoker  The patient's Body mass index is 45.98 kg/m².. BMI is above normal parameters. Recommendations include: educational material and nutrition counseling  Advance Care Planning   ACP discussion was held with the patient during this visit. Patient does not have an advance directive, information provided.      Diagnoses and all orders for this visit:    1. Degeneration of cervical intervertebral disc (Primary)    2. Cervical radiculopathy    3. Spinal stenosis in cervical region    4. Nonsmoker    5. Class 3 severe obesity due to excess calories with body mass index (BMI) of 45.0 to 49.9 in adult, unspecified whether serious comorbidity present (HCC)          I discussed the patients findings and my recommendations with patient    Willy Romero, APRN  03/01/22  10:42 CST

## 2022-03-01 NOTE — PATIENT INSTRUCTIONS
"BMI for Adults  What is BMI?  Body mass index (BMI) is a number that is calculated from a person's weight and height. BMI can help estimate how much of a person's weight is composed of fat. BMI does not measure body fat directly. Rather, it is an alternative to procedures that directly measure body fat, which can be difficult and expensive.  BMI can help identify people who may be at higher risk for certain medical problems.  What are BMI measurements used for?  BMI is used as a screening tool to identify possible weight problems. It helps determine whether a person is obese, overweight, a healthy weight, or underweight.  BMI is useful for:  · Identifying a weight problem that may be related to a medical condition or may increase the risk for medical problems.  · Promoting changes, such as changes in diet and exercise, to help reach a healthy weight. BMI screening can be repeated to see if these changes are working.  How is BMI calculated?  BMI involves measuring your weight in relation to your height. Both height and weight are measured, and the BMI is calculated from those numbers. This can be done either in English ([place] or metric measurements. Note that charts and online BMI calculators are available to help you find your BMI quickly and easily without having to do these calculations yourself.  To calculate your BMI in English ([place] measurements:    1. Measure your weight in pounds (lb).  2. Multiply the number of pounds by 703.  ? For example, for a person who weighs 180 lb, multiply that number by 703, which equals 126,540.  3. Measure your height in inches. Then multiply that number by itself to get a measurement called \"inches squared.\"  ? For example, for a person who is 70 inches tall, the \"inches squared\" measurement is 70 inches x 70 inches, which equals 4,900 inches squared.  4. Divide the total from step 2 (number of lb x 703) by the total from step 3 (inches squared): 126,540 ÷ 4,900 = 25.8. This is " "your BMI.    To calculate your BMI in metric measurements:  1. Measure your weight in kilograms (kg).  2. Measure your height in meters (m). Then multiply that number by itself to get a measurement called \"meters squared.\"  ? For example, for a person who is 1.75 m tall, the \"meters squared\" measurement is 1.75 m x 1.75 m, which is equal to 3.1 meters squared.  3. Divide the number of kilograms (your weight) by the meters squared number. In this example: 70 ÷ 3.1 = 22.6. This is your BMI.  What do the results mean?  BMI charts are used to identify whether you are underweight, normal weight, overweight, or obese. The following guidelines will be used:  · Underweight: BMI less than 18.5.  · Normal weight: BMI between 18.5 and 24.9.  · Overweight: BMI between 25 and 29.9.  · Obese: BMI of 30 or above.  Keep these notes in mind:  · Weight includes both fat and muscle, so someone with a muscular build, such as an athlete, may have a BMI that is higher than 24.9. In cases like these, BMI is not an accurate measure of body fat.  · To determine if excess body fat is the cause of a BMI of 25 or higher, further assessments may need to be done by a health care provider.  · BMI is usually interpreted in the same way for men and women.  Where to find more information  For more information about BMI, including tools to quickly calculate your BMI, go to these websites:  · Centers for Disease Control and Prevention: www.cdc.gov  · American Heart Association: www.heart.org  · National Heart, Lung, and Blood [place]: www.nhlbi.nih.gov  Summary  · Body mass index (BMI) is a number that is calculated from a person's weight and height.  · BMI may help estimate how much of a person's weight is composed of fat. BMI can help identify those who may be at higher risk for certain medical problems.  · BMI can be measured using English measurements or metric measurements.  · BMI charts are used to identify whether you are underweight, normal " "weight, overweight, or obese.  This information is not intended to replace advice given to you by your health care provider. Make sure you discuss any questions you have with your health care provider.  Document Revised: 09/09/2020 Document Reviewed: 07/17/2020  [employer] Patient Education © 2021 [employer]      https://www.nhlbi.nih.gov/files/docs/public/heart/dash_brief.pdf\">   DASH Eating Plan  DASH stands for Dietary Approaches to Stop Hypertension. The DASH eating plan is a healthy eating plan that has been shown to:  · Reduce high blood pressure (hypertension).  · Reduce your risk for type 2 diabetes, heart disease, and stroke.  · Help with weight loss.  What are tips for following this plan?  Reading food labels  · Check food labels for the amount of salt (sodium) per serving. Choose foods with less than 5 percent of the Daily Value of sodium. Generally, foods with less than 300 milligrams (mg) of sodium per serving fit into this eating plan.  · To find whole grains, look for the word \"whole\" as the first word in the ingredient list.  Shopping  · Buy products labeled as \"low-sodium\" or \"no salt added.\"  · Buy fresh foods. Avoid canned foods and pre-made or frozen meals.  Cooking  · Avoid adding salt when cooking. Use salt-free seasonings or herbs instead of table salt or sea salt. Check with your health care provider or pharmacist before using salt substitutes.  · Do not vera foods. Cook foods using healthy methods such as baking, boiling, grilling, roasting, and broiling instead.  · Cook with heart-healthy oils, such as olive, canola, avocado, soybean, or sunflower oil.  Meal planning    · Eat a balanced diet that includes:  ? 4 or more servings of fruits and 4 or more servings of vegetables each day. Try to fill one-half of your plate with fruits and vegetables.  ? 6-8 servings of whole grains each day.  ? Less than 6 oz (170 g) of lean meat, poultry, or fish each day. A 3-oz (85-g) serving of meat is " about the same size as a deck of cards. One egg equals 1 oz (28 g).  ? 2-3 servings of low-fat dairy each day. One serving is 1 cup (237 mL).  ? 1 serving of nuts, seeds, or beans 5 times each week.  ? 2-3 servings of heart-healthy fats. Healthy fats called omega-3 fatty acids are found in foods such as walnuts, flaxseeds, fortified milks, and eggs. These fats are also found in cold-water fish, such as sardines, salmon, and mackerel.  · Limit how much you eat of:  ? Canned or prepackaged foods.  ? Food that is high in trans fat, such as some fried foods.  ? Food that is high in saturated fat, such as fatty meat.  ? Desserts and other sweets, sugary drinks, and other foods with added sugar.  ? Full-fat dairy products.  · Do not salt foods before eating.  · Do not eat more than 4 egg yolks a week.  · Try to eat at least 2 vegetarian meals a week.  · Eat more home-cooked food and less restaurant, buffet, and fast food.    Lifestyle  · When eating at a restaurant, ask that your food be prepared with less salt or no salt, if possible.  · If you drink alcohol:  ? Limit how much you use to:  § 0-1 drink a day for women who are not pregnant.  § 0-2 drinks a day for men.  ? Be aware of how much alcohol is in your drink. In the [place] one drink equals one 12 oz bottle of beer (355 mL), one 5 oz glass of wine (148 mL), or one 1½ oz glass of hard liquor (44 mL).  General information  · Avoid eating more than 2,300 mg of salt a day. If you have hypertension, you may need to reduce your sodium intake to 1,500 mg a day.  · Work with your health care provider to maintain a healthy body weight or to lose weight. Ask what an ideal weight is for you.  · Get at least 30 minutes of exercise that causes your heart to beat faster (aerobic exercise) most days of the week. Activities may include walking, swimming, or biking.  · Work with your health care provider or dietitian to adjust your eating plan to your individual calorie needs.  What  foods should I eat?  Fruits  All fresh, dried, or frozen fruit. Canned fruit in natural juice (without added sugar).  Vegetables  Fresh or frozen vegetables (raw, steamed, roasted, or grilled). Low-sodium or reduced-sodium tomato and vegetable juice. Low-sodium or reduced-sodium tomato sauce and tomato paste. Low-sodium or reduced-sodium canned vegetables.  Grains  Whole-grain or whole-wheat bread. Whole-grain or whole-wheat pasta. Brown rice. Oatmeal. Quinoa. Bulgur. Whole-grain and low-sodium cereals. Radha bread. Low-fat, low-sodium crackers. Whole-wheat flour tortillas.  Meats and other proteins  Skinless chicken or turkey. Ground chicken or turkey. Pork with fat trimmed off. Fish and seafood. Egg whites. Dried beans, peas, or lentils. Unsalted nuts, nut butters, and seeds. Unsalted canned beans. Lean cuts of beef with fat trimmed off. Low-sodium, lean precooked or cured meat, such as sausages or meat loaves.  Dairy  Low-fat (1%) or fat-free (skim) milk. Reduced-fat, low-fat, or fat-free cheeses. Nonfat, low-sodium ricotta or cottage cheese. Low-fat or nonfat yogurt. Low-fat, low-sodium cheese.  Fats and oils  Soft margarine without trans fats. Vegetable oil. Reduced-fat, low-fat, or light mayonnaise and salad dressings (reduced-sodium). Canola, safflower, olive, avocado, soybean, and sunflower oils. Avocado.  Seasonings and condiments  Herbs. Spices. Seasoning mixes without salt.  Other foods  Unsalted popcorn and pretzels. Fat-free sweets.  The items listed above may not be a complete list of foods and beverages you can eat. Contact a dietitian for more information.  What foods should I avoid?  Fruits  Canned fruit in a light or heavy syrup. Fried fruit. Fruit in cream or butter sauce.  Vegetables  Creamed or fried vegetables. Vegetables in a cheese sauce. Regular canned vegetables (not low-sodium or reduced-sodium). Regular canned tomato sauce and paste (not low-sodium or reduced-sodium). Regular tomato and  vegetable juice (not low-sodium or reduced-sodium). Pickles. Olives.  Grains  Baked goods made with fat, such as croissants, muffins, or some breads. Dry pasta or rice meal packs.  Meats and other proteins  Fatty cuts of meat. Ribs. Fried meat. Morfin. Bologna, salami, and other precooked or cured meats, such as sausages or meat loaves. Fat from the back of a pig (fatback). Bratwurst. Salted nuts and seeds. Canned beans with added salt. Canned or smoked fish. Whole eggs or egg yolks. Chicken or turkey with skin.  Dairy  Whole or 2% milk, cream, and half-and-half. Whole or full-fat cream cheese. Whole-fat or sweetened yogurt. Full-fat cheese. Nondairy creamers. Whipped toppings. Processed cheese and cheese spreads.  Fats and oils  Butter. Stick margarine. Lard. Shortening. Ghee. Morfin fat. Tropical oils, such as coconut, palm kernel, or palm oil.  Seasonings and condiments  Onion salt, garlic salt, seasoned salt, table salt, and sea salt. Worcestershire sauce. Tartar sauce. Barbecue sauce. Teriyaki sauce. Soy sauce, including reduced-sodium. Steak sauce. Canned and packaged gravies. Fish sauce. Oyster sauce. Cocktail sauce. Store-bought horseradish. Ketchup. Mustard. Meat flavorings and tenderizers. Bouillon cubes. Hot sauces. Pre-made or packaged marinades. Pre-made or packaged taco seasonings. Relishes. Regular salad dressings.  Other foods  Salted popcorn and pretzels.  The items listed above may not be a complete list of foods and beverages you should avoid. Contact a dietitian for more information.  Where to find more information  · National Heart, Lung, and Blood [place]: www.nhlbi.nih.gov  · American Heart Association: www.heart.org  · Academy of Nutrition and Dietetics: www.eatright.org  · National Kidney Foundation: www.kidney.org  Summary  · The DASH eating plan is a healthy eating plan that has been shown to reduce high blood pressure (hypertension). It may also reduce your risk for type 2 diabetes, heart  disease, and stroke.  · When on the DASH eating plan, aim to eat more fresh fruits and vegetables, whole grains, lean proteins, low-fat dairy, and heart-healthy fats.  · With the DASH eating plan, you should limit salt (sodium) intake to 2,300 mg a day. If you have hypertension, you may need to reduce your sodium intake to 1,500 mg a day.  · Work with your health care provider or dietitian to adjust your eating plan to your individual calorie needs.  This information is not intended to replace advice given to you by your health care provider. Make sure you discuss any questions you have with your health care provider.  Document Revised: 11/20/2020 Document Reviewed: 11/20/2020  [employer] Patient Education © 2021 [employer]      Advance Care Planning and Advance Directives     You make decisions on a daily basis - decisions about where you want to live, your career, your home, your life. Perhaps one of the most important decisions you face is your choice for future medical care. Take time to talk with your family and your healthcare team and start planning today.  Advance Care Planning is a process that can help you:  · Understand possible future healthcare decisions in light of your own experiences  · Reflect on those decision in light of your goals and values  · Discuss your decisions with those closest to you and the healthcare professionals that care for you  · Make a plan by creating a document that reflects your wishes    Surrogate Decision Maker  In the event of a medical emergency, which has left you unable to communicate or to make your own decisions, you would need someone to make decisions for you.  It is important to discuss your preferences for medical treatment with this person while you are in good health.     Qualities of a surrogate decision maker:  • Willing to take on this role and responsibility  • Knows what you want for future medical care  • Willing to follow your wishes even if they don't  agree with them  • Able to make difficult medical decisions under stressful circumstances    Advance Directives  These are legal documents you can create that will guide your healthcare team and decision maker(s) when needed. These documents can be stored in the electronic medical record.    · Living Will - a legal document to guide your care if you have a terminal condition or a serious illness and are unable to communicate. States vary by statute in document names/types, but most forms may include one or more of the following:        -  Directions regarding life-prolonging treatments        -  Directions regarding artificially provided nutrition/hydration        -  Choosing a healthcare decision maker        -  Direction regarding organ/tissue donation    · Durable Power of  for Healthcare - this document names an -in-fact to make medical decisions for you, but it may also allow this person to make personal and financial decisions for you. Please seek the advice of an  if you need this type of document.    **Advance Directives are not required and no one may discriminate against you if you do not sign one.    Medical Orders  Many states allow specific forms/orders signed by your physician to record your wishes for medical treatment in your current state of health. This form, signed in personal communication with your physician, addresses resuscitation and other medical interventions that you may or may not want.      For more information or to schedule a time with a [facility] Advance Care Planning Facilitator contact: [email or web address] or call [phone] and someone will contact you directly.

## 2022-03-04 ENCOUNTER — APPOINTMENT (OUTPATIENT)
Dept: GENERAL RADIOLOGY | Facility: HOSPITAL | Age: 67
End: 2022-03-04

## 2022-03-04 ENCOUNTER — HOSPITAL ENCOUNTER (EMERGENCY)
Facility: HOSPITAL | Age: 67
Discharge: HOME | End: 2022-03-04
Attending: EMERGENCY MEDICINE | Admitting: EMERGENCY MEDICINE

## 2022-03-04 ENCOUNTER — APPOINTMENT (OUTPATIENT)
Dept: CT IMAGING | Facility: HOSPITAL | Age: 67
End: 2022-03-04

## 2022-03-04 ENCOUNTER — HOSPITAL ENCOUNTER (EMERGENCY)
Facility: HOSPITAL | Age: 67
Discharge: HOME | End: 2022-03-05
Attending: EMERGENCY MEDICINE | Admitting: EMERGENCY MEDICINE

## 2022-03-04 VITALS
SYSTOLIC BLOOD PRESSURE: 134 MMHG | WEIGHT: 315 LBS | OXYGEN SATURATION: 100 % | HEART RATE: 73 BPM | HEIGHT: 72 IN | DIASTOLIC BLOOD PRESSURE: 120 MMHG | TEMPERATURE: 98.2 F | BODY MASS INDEX: 42.66 KG/M2 | RESPIRATION RATE: 20 BRPM

## 2022-03-04 DIAGNOSIS — R51.9 ACUTE NONINTRACTABLE HEADACHE, UNSPECIFIED HEADACHE TYPE: ICD-10-CM

## 2022-03-04 DIAGNOSIS — J40 BRONCHITIS: Primary | ICD-10-CM

## 2022-03-04 DIAGNOSIS — R10.84 GENERALIZED ABDOMINAL PAIN: ICD-10-CM

## 2022-03-04 LAB
ALBUMIN SERPL-MCNC: 3.8 G/DL (ref 3.5–5.2)
ALBUMIN SERPL-MCNC: 4.2 G/DL (ref 3.5–5.2)
ALBUMIN/GLOB SERPL: 1.3 G/DL
ALBUMIN/GLOB SERPL: 1.3 G/DL
ALP SERPL-CCNC: 102 U/L (ref 39–117)
ALP SERPL-CCNC: 96 U/L (ref 39–117)
ALT SERPL W P-5'-P-CCNC: 26 U/L (ref 1–41)
ALT SERPL W P-5'-P-CCNC: 31 U/L (ref 1–41)
ANION GAP SERPL CALCULATED.3IONS-SCNC: 11 MMOL/L (ref 5–15)
ANION GAP SERPL CALCULATED.3IONS-SCNC: 15 MMOL/L (ref 5–15)
AST SERPL-CCNC: 27 U/L (ref 1–40)
AST SERPL-CCNC: 32 U/L (ref 1–40)
BASOPHILS # BLD AUTO: 0.02 10*3/MM3 (ref 0–0.2)
BASOPHILS # BLD AUTO: 0.04 10*3/MM3 (ref 0–0.2)
BASOPHILS NFR BLD AUTO: 0.3 % (ref 0–1.5)
BASOPHILS NFR BLD AUTO: 0.4 % (ref 0–1.5)
BILIRUB SERPL-MCNC: 0.3 MG/DL (ref 0–1.2)
BILIRUB SERPL-MCNC: 0.5 MG/DL (ref 0–1.2)
BUN SERPL-MCNC: 58 MG/DL (ref 8–23)
BUN SERPL-MCNC: 66 MG/DL (ref 8–23)
BUN/CREAT SERPL: 22 (ref 7–25)
BUN/CREAT SERPL: 22.2 (ref 7–25)
CALCIUM SPEC-SCNC: 8.7 MG/DL (ref 8.6–10.5)
CALCIUM SPEC-SCNC: 8.7 MG/DL (ref 8.6–10.5)
CHLORIDE SERPL-SCNC: 101 MMOL/L (ref 98–107)
CHLORIDE SERPL-SCNC: 102 MMOL/L (ref 98–107)
CO2 SERPL-SCNC: 23 MMOL/L (ref 22–29)
CO2 SERPL-SCNC: 25 MMOL/L (ref 22–29)
CREAT SERPL-MCNC: 2.61 MG/DL (ref 0.76–1.27)
CREAT SERPL-MCNC: 3 MG/DL (ref 0.76–1.27)
D-LACTATE SERPL-SCNC: 1 MMOL/L (ref 0.5–2)
D-LACTATE SERPL-SCNC: 1.1 MMOL/L (ref 0.5–2)
DEPRECATED RDW RBC AUTO: 49.1 FL (ref 37–54)
DEPRECATED RDW RBC AUTO: 49.9 FL (ref 37–54)
EGFRCR SERPLBLD CKD-EPI 2021: 22.3 ML/MIN/1.73
EGFRCR SERPLBLD CKD-EPI 2021: 26.4 ML/MIN/1.73
EOSINOPHIL # BLD AUTO: 0.46 10*3/MM3 (ref 0–0.4)
EOSINOPHIL # BLD AUTO: 0.48 10*3/MM3 (ref 0–0.4)
EOSINOPHIL NFR BLD AUTO: 5.1 % (ref 0.3–6.2)
EOSINOPHIL NFR BLD AUTO: 6.5 % (ref 0.3–6.2)
ERYTHROCYTE [DISTWIDTH] IN BLOOD BY AUTOMATED COUNT: 15.6 % (ref 12.3–15.4)
ERYTHROCYTE [DISTWIDTH] IN BLOOD BY AUTOMATED COUNT: 15.7 % (ref 12.3–15.4)
FLUAV RNA RESP QL NAA+PROBE: NOT DETECTED
FLUBV RNA RESP QL NAA+PROBE: NOT DETECTED
GLOBULIN UR ELPH-MCNC: 2.9 GM/DL
GLOBULIN UR ELPH-MCNC: 3.3 GM/DL
GLUCOSE SERPL-MCNC: 184 MG/DL (ref 65–99)
GLUCOSE SERPL-MCNC: 277 MG/DL (ref 65–99)
HCT VFR BLD AUTO: 27.9 % (ref 37.5–51)
HCT VFR BLD AUTO: 30.6 % (ref 37.5–51)
HGB BLD-MCNC: 9.1 G/DL (ref 13–17.7)
HGB BLD-MCNC: 9.8 G/DL (ref 13–17.7)
IMM GRANULOCYTES # BLD AUTO: 0.02 10*3/MM3 (ref 0–0.05)
IMM GRANULOCYTES # BLD AUTO: 0.03 10*3/MM3 (ref 0–0.05)
IMM GRANULOCYTES NFR BLD AUTO: 0.3 % (ref 0–0.5)
IMM GRANULOCYTES NFR BLD AUTO: 0.3 % (ref 0–0.5)
LYMPHOCYTES # BLD AUTO: 1.23 10*3/MM3 (ref 0.7–3.1)
LYMPHOCYTES # BLD AUTO: 1.39 10*3/MM3 (ref 0.7–3.1)
LYMPHOCYTES NFR BLD AUTO: 15.4 % (ref 19.6–45.3)
LYMPHOCYTES NFR BLD AUTO: 16.6 % (ref 19.6–45.3)
MCH RBC QN AUTO: 27.8 PG (ref 26.6–33)
MCH RBC QN AUTO: 28 PG (ref 26.6–33)
MCHC RBC AUTO-ENTMCNC: 32 G/DL (ref 31.5–35.7)
MCHC RBC AUTO-ENTMCNC: 32.6 G/DL (ref 31.5–35.7)
MCV RBC AUTO: 85.8 FL (ref 79–97)
MCV RBC AUTO: 86.7 FL (ref 79–97)
MONOCYTES # BLD AUTO: 0.71 10*3/MM3 (ref 0.1–0.9)
MONOCYTES # BLD AUTO: 0.92 10*3/MM3 (ref 0.1–0.9)
MONOCYTES NFR BLD AUTO: 10.2 % (ref 5–12)
MONOCYTES NFR BLD AUTO: 9.6 % (ref 5–12)
NEUTROPHILS NFR BLD AUTO: 4.96 10*3/MM3 (ref 1.7–7)
NEUTROPHILS NFR BLD AUTO: 6.16 10*3/MM3 (ref 1.7–7)
NEUTROPHILS NFR BLD AUTO: 66.7 % (ref 42.7–76)
NEUTROPHILS NFR BLD AUTO: 68.6 % (ref 42.7–76)
NRBC BLD AUTO-RTO: 0 /100 WBC (ref 0–0.2)
NRBC BLD AUTO-RTO: 0 /100 WBC (ref 0–0.2)
PLATELET # BLD AUTO: 210 10*3/MM3 (ref 140–450)
PLATELET # BLD AUTO: 221 10*3/MM3 (ref 140–450)
PMV BLD AUTO: 10.9 FL (ref 6–12)
PMV BLD AUTO: 11.1 FL (ref 6–12)
POTASSIUM SERPL-SCNC: 3.8 MMOL/L (ref 3.5–5.2)
POTASSIUM SERPL-SCNC: 4.3 MMOL/L (ref 3.5–5.2)
PROCALCITONIN SERPL-MCNC: 0.11 NG/ML (ref 0–0.25)
PROT SERPL-MCNC: 6.7 G/DL (ref 6–8.5)
PROT SERPL-MCNC: 7.5 G/DL (ref 6–8.5)
QT INTERVAL: 400 MS
QTC INTERVAL: 456 MS
RBC # BLD AUTO: 3.25 10*6/MM3 (ref 4.14–5.8)
RBC # BLD AUTO: 3.53 10*6/MM3 (ref 4.14–5.8)
SARS-COV-2 RNA RESP QL NAA+PROBE: NOT DETECTED
SODIUM SERPL-SCNC: 138 MMOL/L (ref 136–145)
SODIUM SERPL-SCNC: 139 MMOL/L (ref 136–145)
WBC NRBC COR # BLD: 7.42 10*3/MM3 (ref 3.4–10.8)
WBC NRBC COR # BLD: 9 10*3/MM3 (ref 3.4–10.8)

## 2022-03-04 PROCEDURE — 87636 SARSCOV2 & INF A&B AMP PRB: CPT | Performed by: EMERGENCY MEDICINE

## 2022-03-04 PROCEDURE — 99284 EMERGENCY DEPT VISIT MOD MDM: CPT

## 2022-03-04 PROCEDURE — 83605 ASSAY OF LACTIC ACID: CPT | Performed by: EMERGENCY MEDICINE

## 2022-03-04 PROCEDURE — 74176 CT ABD & PELVIS W/O CONTRAST: CPT

## 2022-03-04 PROCEDURE — 25010000002 ONDANSETRON PER 1 MG: Performed by: EMERGENCY MEDICINE

## 2022-03-04 PROCEDURE — 87040 BLOOD CULTURE FOR BACTERIA: CPT | Performed by: EMERGENCY MEDICINE

## 2022-03-04 PROCEDURE — 85025 COMPLETE CBC W/AUTO DIFF WBC: CPT | Performed by: EMERGENCY MEDICINE

## 2022-03-04 PROCEDURE — 0 HYDROMORPHONE 1 MG/ML SOLUTION: Performed by: EMERGENCY MEDICINE

## 2022-03-04 PROCEDURE — 80053 COMPREHEN METABOLIC PANEL: CPT | Performed by: EMERGENCY MEDICINE

## 2022-03-04 PROCEDURE — 70450 CT HEAD/BRAIN W/O DYE: CPT

## 2022-03-04 PROCEDURE — 94640 AIRWAY INHALATION TREATMENT: CPT

## 2022-03-04 PROCEDURE — 84145 PROCALCITONIN (PCT): CPT | Performed by: EMERGENCY MEDICINE

## 2022-03-04 PROCEDURE — 93005 ELECTROCARDIOGRAM TRACING: CPT | Performed by: EMERGENCY MEDICINE

## 2022-03-04 PROCEDURE — 96374 THER/PROPH/DIAG INJ IV PUSH: CPT

## 2022-03-04 PROCEDURE — 93010 ELECTROCARDIOGRAM REPORT: CPT | Performed by: INTERNAL MEDICINE

## 2022-03-04 PROCEDURE — 71045 X-RAY EXAM CHEST 1 VIEW: CPT

## 2022-03-04 PROCEDURE — 96375 TX/PRO/DX INJ NEW DRUG ADDON: CPT

## 2022-03-04 RX ORDER — IPRATROPIUM BROMIDE AND ALBUTEROL SULFATE 2.5; .5 MG/3ML; MG/3ML
3 SOLUTION RESPIRATORY (INHALATION) ONCE
Status: COMPLETED | OUTPATIENT
Start: 2022-03-04 | End: 2022-03-04

## 2022-03-04 RX ORDER — ONDANSETRON 2 MG/ML
4 INJECTION INTRAMUSCULAR; INTRAVENOUS ONCE
Status: COMPLETED | OUTPATIENT
Start: 2022-03-04 | End: 2022-03-04

## 2022-03-04 RX ORDER — SODIUM CHLORIDE 0.9 % (FLUSH) 0.9 %
10 SYRINGE (ML) INJECTION AS NEEDED
Status: DISCONTINUED | OUTPATIENT
Start: 2022-03-04 | End: 2022-03-05 | Stop reason: HOSPADM

## 2022-03-04 RX ORDER — SODIUM CHLORIDE 0.9 % (FLUSH) 0.9 %
10 SYRINGE (ML) INJECTION AS NEEDED
Status: DISCONTINUED | OUTPATIENT
Start: 2022-03-04 | End: 2022-03-04 | Stop reason: HOSPADM

## 2022-03-04 RX ORDER — SODIUM CHLORIDE 9 MG/ML
125 INJECTION, SOLUTION INTRAVENOUS CONTINUOUS
Status: DISCONTINUED | OUTPATIENT
Start: 2022-03-04 | End: 2022-03-05 | Stop reason: HOSPADM

## 2022-03-04 RX ORDER — AZITHROMYCIN 250 MG/1
500 TABLET, FILM COATED ORAL ONCE
Status: COMPLETED | OUTPATIENT
Start: 2022-03-04 | End: 2022-03-04

## 2022-03-04 RX ORDER — AZITHROMYCIN 250 MG/1
TABLET, FILM COATED ORAL
Qty: 6 TABLET | Refills: 0 | Status: SHIPPED | OUTPATIENT
Start: 2022-03-04 | End: 2022-03-07 | Stop reason: SDUPTHER

## 2022-03-04 RX ADMIN — ONDANSETRON 4 MG: 2 INJECTION INTRAMUSCULAR; INTRAVENOUS at 23:18

## 2022-03-04 RX ADMIN — IPRATROPIUM BROMIDE AND ALBUTEROL SULFATE 3 ML: .5; 3 SOLUTION RESPIRATORY (INHALATION) at 02:49

## 2022-03-04 RX ADMIN — AZITHROMYCIN 500 MG: 250 TABLET, FILM COATED ORAL at 04:43

## 2022-03-04 RX ADMIN — HYDROMORPHONE HYDROCHLORIDE 1 MG: 1 INJECTION, SOLUTION INTRAMUSCULAR; INTRAVENOUS; SUBCUTANEOUS at 23:18

## 2022-03-04 RX ADMIN — HYDROCODONE BITARTRATE AND HOMATROPINE METHYLBROMIDE 5 ML: 5; 1.5 SOLUTION ORAL at 04:43

## 2022-03-04 NOTE — ED PROVIDER NOTES
Subjective   Patient presents with a complaint of mostly fever.  He said that been going on for the last approximately 2 to 3 days.  He says his fevers been as high as 104.  He takes Tylenol and it goes down but it always comes back up.  During the same time he had a lot of cough and congestion.  He is coughing up some clear to yellowish sputum.  He said he has had some chest pain also.  He does not really directly relate this to the cough but also says it does not radiate anywhere.  He has had some vomiting tonight.  He said the vomitus had included some flecks of blood to do that is fine but prompted him to come in with the fever.  He had taken Tylenol prior to coming here.      History provided by:  Patient   used: No    Fever  Max temp prior to arrival:  104  Temp source:  Oral  Severity:  Severe  Onset quality:  Gradual  Duration:  2 days  Timing:  Constant  Progression:  Waxing and waning  Chronicity:  New  Relieved by:  Acetaminophen  Worsened by:  Nothing  Ineffective treatments:  None tried  Associated symptoms: chest pain, chills, cough, nausea and vomiting    Associated symptoms: no confusion, no congestion, no diarrhea, no dysuria, no ear pain, no headaches, no myalgias, no rash, no rhinorrhea, no somnolence and no sore throat    Risk factors: no contaminated food, no contaminated water, no hx of cancer, no immunosuppression, no occupational exposure, no recent sickness, no recent travel and no sick contacts        Review of Systems   Constitutional: Positive for chills and fever.   HENT: Negative.  Negative for congestion, ear pain, rhinorrhea and sore throat.    Respiratory: Positive for cough.    Cardiovascular: Positive for chest pain.   Gastrointestinal: Positive for nausea and vomiting. Negative for diarrhea.   Genitourinary: Negative.  Negative for dysuria.   Musculoskeletal: Negative.  Negative for myalgias.   Skin: Negative.  Negative for rash.   Neurological: Negative.   "Negative for headaches.   Psychiatric/Behavioral: Negative.  Negative for confusion.   All other systems reviewed and are negative.      Past Medical History:   Diagnosis Date   • Arthritis    • Autonomic disease    • CAD (coronary artery disease) 2/6/2017   • Cervical radiculopathy 9/16/2021   • Chronic constipation with acute exaccerbation 5/10/2021   • Coronary artery disease    • Degeneration of cervical intervertebral disc 8/11/2021   • Diabetes mellitus (HCC)    • Diabetic foot ulcer (HCC) 8/31/2020   • Diabetic polyneuropathy associated with type 2 diabetes mellitus (HCC) 1/18/2021   • Gastroesophageal reflux disease 5/13/2019   • HTN (hypertension), benign 5/3/2017   • Hyperlipidemia    • Hypertension    • Mixed hyperlipidemia 2/7/2017   • Multiple lung nodules 1/26/2020    5mm, 9 mm RLL identified 1/2020, not present 10/2019.   • Myocardial infarction (HCC)    • Osteomyelitis (HCC) 1/22/2020   • Osteomyelitis of fifth toe of right foot ([facility]) 10/7/2019   • Pancreatitis    • Persistent insomnia 1/20/2020   • Renal disorder    • Sleep apnea 2/6/2017   • Sleep apnea with use of continuous positive airway pressure (CPAP)    • Slow transit constipation 1/16/2019   • Spinal stenosis in cervical region 9/16/2021   • Vitamin D deficiency 3/2/2021       Allergies   Allergen Reactions   • Cefepime Hives and Anaphylaxis   • Bactrim [Sulfamethoxazole-Trimethoprim] Other (See Comments)     \"RENAL FAILURE\"   • Clindamycin Itching   • Vancomycin Itching   • Zolpidem Unknown - High Severity   • Metronidazole Rash       Past Surgical History:   Procedure Laterality Date   • ABDOMINAL SURGERY     • AMPUTATION FOOT / TOE Left 10/2021    5th digit    • APPENDECTOMY     • BACK SURGERY     • CARDIAC CATHETERIZATION Left 2/8/2021    Procedure: Left Heart Cath w poss intervention left anatomical snuff box acess;  Surgeon: Omkar Charles DO;  Location:  [facility];  Service: Cardiology;  Laterality: " Left;   • CARDIAC SURGERY     • CATARACT EXTRACTION     • CERVICAL SPINE SURGERY     • COLONOSCOPY N/A 2017    Normal exam repeat in 5 years   • COLONOSCOPY N/A 2019    Mild acute inflammation   • COLONOSCOPY W/ POLYPECTOMY  2014    Hyperplastic polyp   • CORONARY ARTERY BYPASS GRAFT  10/2015   • ENDOSCOPY  2011    Gastritis with hemorrhage   • ENDOSCOPY N/A 2017    Normal exam   • ENDOSCOPY N/A 2019    Gastritis   • ENDOSCOPY N/A 2020    Non-erosive gastritis with hemorrhage   • ENDOSCOPY N/A 2/10/2021    Esophagitis   • INCISION AND DRAINAGE OF WOUND Left 2007    spider bite       Family History   Problem Relation Age of Onset   • Colon cancer Father    • Heart disease Father    • Colon cancer Sister    • Colon polyps Sister    • Alzheimer's disease Mother    • Coronary artery disease Sister    • Coronary artery disease Sister        Social History     Socioeconomic History   • Marital status:    Tobacco Use   • Smoking status: Former Smoker     Quit date:      Years since quittin.   • Smokeless tobacco: Never Used   • Tobacco comment: smoked in highschool   Vaping Use   • Vaping Use: Never used   Substance and Sexual Activity   • Alcohol use: No   • Drug use: No   • Sexual activity: Defer       Prior to Admission medications    Medication Sig Start Date End Date Taking? Authorizing Provider   aspirin 81 MG EC tablet Take 81 mg by mouth Daily.    Provider, MD Bossman   bumetanide (BUMEX) 2 MG tablet Take 1 tablet by mouth 2 (Two) Times a Day. 21   Payam Keyes DO   busPIRone (BUSPAR) 10 MG tablet Take 1 tablet 3 times a day by mouth as needed. 21      busPIRone (BUSPAR) 10 MG tablet Take 1 tablet by mouth 3 (Three) Times a Day As Needed.  Patient not taking: Reported on 2021      carvedilol (COREG) 6.25 MG tablet Take 1 tablet by mouth 2 (Two) Times a Day. 21      colchicine 0.6 MG tablet Take 1 tablet by mouth 2  (two) times a day. 11/1/21      cyclobenzaprine (FLEXERIL) 5 MG tablet Take 1 tablet by mouth 2 (Two) Times a Day As Needed for muscle spasms 1/28/22      DAPTOmycin 500 mg in Sodium Chloride (PF) 0.9 % 10 mL Infuse 500 mg into a venous catheter Daily. 1/14/22   Charley Gomes DPM   Delafloxacin Meglumine (Baxdela) 450 MG tablet Take 1 tablet by mouth 2 (Two) Times a Day for 3 weeks 12/22/21      Delafloxacin Meglumine (Baxdela) 450 MG tablet Take 1 tablet by mouth twice daily 2/9/22      donepezil (ARICEPT) 10 MG tablet Take 1 tablet by mouth Daily. 12/29/21      Dulaglutide (Trulicity) 1.5 MG/0.5ML solution pen-injector Inject 1.5 mg under the skin into the appropriate area as directed 1 (One) Time Per Week. 11/18/21   Dylan Figueroa APRN   DULoxetine (CYMBALTA) 60 MG capsule Take 60 mg by mouth Daily.    Provider, MD Bossman   DULoxetine (CYMBALTA) 60 MG capsule Take 1 capsule by mouth Daily. 11/8/21      Finerenone (Kerendia) 10 MG tablet Take 10 mg by mouth Daily. 11/30/21      Finerenone 10 MG tablet Take 1 tablet by mouth every day  Patient not taking: Reported on 12/16/2021 11/24/21      gabapentin (NEURONTIN) 600 MG tablet Take 1 tablet by mouth 3 (Three) Times a Day. 11/15/21      HEPARIN LOCK FLUSH IV Infuse 5 Units/mL into a venous catheter Daily. 1/14/22   Charley Gomes DPM   HYDROcodone-acetaminophen (NORCO) 7.5-325 MG per tablet Take 1 tablet by mouth 2 (Two) Times a Day As Needed. 12/21/21      HYDROcodone-acetaminophen (NORCO) 7.5-325 MG per tablet Take 1 tablet twice a day by oral route as needed. 1/10/22      HYDROcodone-acetaminophen (NORCO) 7.5-325 MG per tablet Take 1 tablet by mouth 2 (Two) Times a Day As Needed.  (replaces Percocet) 2/8/22      Insulin Lispro, 1 Unit Dial, (HumaLOG KwikPen) 100 UNIT/ML solution pen-injector Inject according to sliding scale:  glucose=151-200 use 2 UNITS; glucose=201-250 use 4 units; glucose=251-300 use 6 units; above 301 use 8 units  1/28/21   Dylan Figueroa APRN   Insulin Pen Needle 31G X 5 MM misc Use three times a day. 11/2/21      Insulin Regular Human, Conc, (HumuLIN R) 500 UNIT/ML solution pen-injector CONCENTRATED injection Inject 120 Units under the skin into the appropriate area as directed 2 (Two) Times a Day with breakfast and dinner. 8/18/21   Dylan Figueroa APRN   ipratropium-albuterol (DUO-NEB) 0.5-2.5 mg/3 ml nebulizer Inhale 3 mL twice a day by nebulization route as needed. 12/14/21      ipratropium-albuterol (DUO-NEB) 0.5-2.5 mg/3 ml nebulizer Inhale 3 mL twice a day by nebulization route as needed. 12/14/21      levoFLOXacin (LEVAQUIN) 750 MG tablet Take 1 tablet by mouth Daily. 1/6/22   Charley Gomes DPM   moxifloxacin (AVELOX) 400 MG tablet Take 1 tablet by mouth Daily. 12/22/21      oxyCODONE-acetaminophen (PERCOCET) 5-325 MG per tablet Take 1 tablet by mouth Every 4 (Four) Hours. 1/12/22      pantoprazole (PROTONIX) 40 MG EC tablet Take 1 tablet by mouth twice a day. 9/22/21      prazosin (MINIPRESS) 2 MG capsule Take 1 capsule by mouth every night at bedtime. 11/12/21      rosuvastatin (CRESTOR) 40 MG tablet Take 1 tablet by mouth Every Night. 11/30/21      Sodium Chloride Flush (NORMAL SALINE FLUSH IV) Infuse 10 mL into a venous catheter Daily. 1/14/22   Charley Gomes DPM   sodium hypochlorite (DAKIN'S) 0.25 % topical solution Apply to wound twice a day as directed 11/22/21      tamsulosin (FLOMAX) 0.4 MG capsule 24 hr capsule Take 1 capsule by mouth Daily.    Provider, MD Bossman   tamsulosin (FLOMAX) 0.4 MG capsule 24 hr capsule Take 1 capsule by mouth Daily. 1/28/22      traZODone (DESYREL) 100 MG tablet Take 1 tablet by mouth Every Night. 12/14/21      traZODone (DESYREL) 100 MG tablet Take 1 tablet by mouth At Night As Needed. 2/8/22      vitamin D (ERGOCALCIFEROL) 1.25 MG (92452 UT) capsule capsule Take 1 capsule by mouth 1 (One) Time Per Week. 11/24/21   Brian Lomas MD    spironolactone (ALDACTONE) 25 MG tablet Take 1 tablet by mouth Daily. 9/28/20 12/31/20  Del Shetty MD       Medications   sodium chloride 0.9 % flush 10 mL (has no administration in time range)   HYDROcodone-homatropine (HYCODAN) 5-1.5 MG/5ML syrup 5 mL (5 mL Oral Given 3/4/22 5193)   ipratropium-albuterol (DUO-NEB) nebulizer solution 3 mL (3 mL Nebulization Given 3/4/22 8299)   azithromycin (ZITHROMAX) tablet 500 mg (500 mg Oral Given 3/4/22 0443)       Vitals:    03/04/22 0456   BP: (!) 134/120   Pulse: 73   Resp: 20   Temp: 98.2 °F (36.8 °C)   SpO2: 100%         Objective   Physical Exam  Vitals and nursing note reviewed.   Constitutional:       Appearance: He is well-developed.   HENT:      Head: Normocephalic and atraumatic.   Cardiovascular:      Rate and Rhythm: Normal rate and regular rhythm.   Pulmonary:      Effort: Pulmonary effort is normal.      Breath sounds: Examination of the right-middle field reveals wheezing. Examination of the left-middle field reveals wheezing. Wheezing present.   Abdominal:      General: Bowel sounds are normal.      Palpations: Abdomen is soft.   Musculoskeletal:         General: Normal range of motion.      Cervical back: Normal range of motion and neck supple.   Skin:     General: Skin is warm and dry.   Neurological:      General: No focal deficit present.      Mental Status: He is alert and oriented to person, place, and time.   Psychiatric:         Mood and Affect: Mood normal.         Behavior: Behavior normal.         Procedures         Lab Results (last 24 hours)     Procedure Component Value Units Date/Time    COVID PRE-OP / PRE-PROCEDURE SCREENING ORDER (NO ISOLATION) - Swab, Nasopharynx [[id number]]  (Normal) Collected: 03/04/22 0208    Specimen: Swab from Nasopharynx Updated: 03/04/22 0315    Narrative:      The following orders were created for panel order COVID PRE-OP / PRE-PROCEDURE SCREENING ORDER (NO ISOLATION) - Swab, Nasopharynx.  Procedure                                Abnormality         Status                     ---------                               -----------         ------                     COVID-19 and FLU A/B PCR...[[id number]]  Normal              Final result                 Please view results for these tests on the individual orders.    COVID-19 and FLU A/B PCR - Swab, Nasopharynx [[id number]]  (Normal) Collected: 03/04/22 0208    Specimen: Swab from Nasopharynx Updated: 03/04/22 0315     COVID19 Not Detected     Influenza A PCR Not Detected     Influenza B PCR Not Detected    Narrative:      Fact sheet for providers: https://www.fda.gov/media/775972/download    Fact sheet for patients: https://www.fda.gov/media/526301/download    Test performed by PCR.    CBC & Differential [[id number]]  (Abnormal) Collected: 03/04/22 0211    Specimen: Blood Updated: 03/04/22 0241    Narrative:      The following orders were created for panel order CBC & Differential.  Procedure                               Abnormality         Status                     ---------                               -----------         ------                     CBC Auto Differential[[id number]]        Abnormal            Final result                 Please view results for these tests on the individual orders.    Comprehensive Metabolic Panel [[id number]]  (Abnormal) Collected: 03/04/22 0211    Specimen: Blood Updated: 03/04/22 0302     Glucose 184 mg/dL      BUN 66 mg/dL      Creatinine 3.00 mg/dL      Sodium 139 mmol/L      Potassium 4.3 mmol/L      Chloride 101 mmol/L      CO2 23.0 mmol/L      Calcium 8.7 mg/dL      Total Protein 7.5 g/dL      Albumin 4.20 g/dL      ALT (SGPT) 31 U/L      AST (SGOT) 32 U/L      Alkaline Phosphatase 102 U/L      Total Bilirubin 0.5 mg/dL      Globulin 3.3 gm/dL      A/G Ratio 1.3 g/dL      BUN/Creatinine Ratio 22.0     Anion Gap 15.0 mmol/L      eGFR 22.3 mL/min/1.73      Comment: National Kidney Foundation and American Society of Nephrology (ASN) Task  Force recommended calculation based on the Chronic Kidney Disease Epidemiology Collaboration (CKD-EPI) equation refit without adjustment for race.       Narrative:      GFR Normal >60  Chronic Kidney Disease <60  Kidney Failure <15      Blood Culture - Blood, Hand, Right [[id number]] Collected: 03/04/22 0211    Specimen: Blood from Hand, Right Updated: 03/04/22 0249    Blood Culture - Blood, Hand, Right [[id number]] Collected: 03/04/22 0211    Specimen: Blood from Hand, Right Updated: 03/04/22 0249    Lactic Acid, Plasma [[id number]]  (Normal) Collected: 03/04/22 0211    Specimen: Blood Updated: 03/04/22 0300     Lactate 1.1 mmol/L     CBC Auto Differential [[id number]]  (Abnormal) Collected: 03/04/22 0211    Specimen: Blood Updated: 03/04/22 0241     WBC 9.00 10*3/mm3      RBC 3.53 10*6/mm3      Hemoglobin 9.8 g/dL      Hematocrit 30.6 %      MCV 86.7 fL      MCH 27.8 pg      MCHC 32.0 g/dL      RDW 15.7 %      RDW-SD 49.9 fl      MPV 11.1 fL      Platelets 221 10*3/mm3      Neutrophil % 68.6 %      Lymphocyte % 15.4 %      Monocyte % 10.2 %      Eosinophil % 5.1 %      Basophil % 0.4 %      Immature Grans % 0.3 %      Neutrophils, Absolute 6.16 10*3/mm3      Lymphocytes, Absolute 1.39 10*3/mm3      Monocytes, Absolute 0.92 10*3/mm3      Eosinophils, Absolute 0.46 10*3/mm3      Basophils, Absolute 0.04 10*3/mm3      Immature Grans, Absolute 0.03 10*3/mm3      nRBC 0.0 /100 WBC           XR Chest 1 View   ED Interpretation   NAD          ED Course  ED Course as of 03/04/22 0538   Fri Mar 04, 2022   0536 I told the patient his testing is actually turned out pretty good.  He is a little anemic but has been that way in the past.  His white blood cell count is normal.  His BUN and creatinine are elevated but they are actually better than his last lab work.  His chest x-ray does not show an infiltrate that I can tell.  I think this is a bronchitis that we can safely treat without put him in the hospital.  We will get him on  some antibiotics and some for the cough.  He is discharged in stable condition.  His Covid and his flu test were negative. [TR]      ED Course User Index  [TR] Karel Arriaza Jr., MD          MDM  Number of Diagnoses or Management Options  Bronchitis: new and requires workup     Amount and/or Complexity of Data Reviewed  Clinical lab tests: ordered and reviewed  Tests in the radiology section of CPT®: ordered and reviewed  Tests in the medicine section of CPT®: ordered and reviewed  Decide to obtain previous medical records or to obtain history from someone other than the patient: yes  Independent visualization of images, tracings, or specimens: yes    Risk of Complications, Morbidity, and/or Mortality  Presenting problems: moderate  Diagnostic procedures: moderate  Management options: moderate    Patient Progress  Patient progress: stable      Final diagnoses:   Bronchitis          Karel Arriaza Jr., MD  03/04/22 6507

## 2022-03-05 VITALS
SYSTOLIC BLOOD PRESSURE: 122 MMHG | WEIGHT: 315 LBS | OXYGEN SATURATION: 98 % | HEIGHT: 72 IN | HEART RATE: 76 BPM | TEMPERATURE: 98 F | RESPIRATION RATE: 20 BRPM | BODY MASS INDEX: 42.66 KG/M2 | DIASTOLIC BLOOD PRESSURE: 76 MMHG

## 2022-03-05 RX ADMIN — SODIUM CHLORIDE 125 ML/HR: 9 INJECTION, SOLUTION INTRAVENOUS at 00:00

## 2022-03-05 NOTE — ED PROVIDER NOTES
Subjective   Patient presents with a complaint that he is got a severe headache along with fever that is still high at the day.  He says is been feeling dizzy and coughing a lot.  He still feels somewhat short of breath.  He is also hurting in his abdomen a great deal.  He was seen here last night with similar symptoms and diagnosed with bronchitis.  He says it received a phone call from his doctor's office today and was told he actually had pneumonia.  His biggest complaint tonight apparently is his headache that he cannot seem to get some relief from.  He has headaches anyway because of bad neck pain in the past but this is worse than usual.  He also complains of diffuse abdominal pain but no vomiting or diarrhea.      History provided by:  Patient   used: No    Fever  Temp source:  Subjective  Severity:  Severe  Onset quality:  Gradual  Duration:  3 days  Timing:  Constant  Progression:  Waxing and waning  Chronicity:  New  Relieved by:  Acetaminophen  Worsened by:  Nothing  Ineffective treatments:  None tried  Associated symptoms: chills, cough, headaches and myalgias    Associated symptoms: no chest pain, no confusion, no congestion, no diarrhea, no dysuria, no ear pain, no nausea, no rash, no rhinorrhea, no somnolence, no sore throat and no vomiting    Risk factors: recent sickness    Risk factors: no contaminated food, no contaminated water, no hx of cancer, no immunosuppression, no occupational exposure, no recent travel and no sick contacts        Review of Systems   Constitutional: Positive for chills and fever.   HENT: Negative for congestion, ear pain, rhinorrhea and sore throat.    Respiratory: Positive for cough.    Cardiovascular: Negative.  Negative for chest pain.   Gastrointestinal: Positive for abdominal pain. Negative for diarrhea, nausea and vomiting.   Genitourinary: Negative.  Negative for dysuria.   Musculoskeletal: Positive for myalgias.   Skin: Negative.  Negative for  "rash.   Neurological: Positive for headaches.   Psychiatric/Behavioral: Negative.  Negative for confusion.   All other systems reviewed and are negative.      Past Medical History:   Diagnosis Date   • Arthritis    • Autonomic disease    • CAD (coronary artery disease) 2/6/2017   • Cervical radiculopathy 9/16/2021   • Chronic constipation with acute exaccerbation 5/10/2021   • Coronary artery disease    • Degeneration of cervical intervertebral disc 8/11/2021   • Diabetes mellitus (HCC)    • Diabetic foot ulcer (HCC) 8/31/2020   • Diabetic polyneuropathy associated with type 2 diabetes mellitus (HCC) 1/18/2021   • Gastroesophageal reflux disease 5/13/2019   • HTN (hypertension), benign 5/3/2017   • Hyperlipidemia    • Hypertension    • Mixed hyperlipidemia 2/7/2017   • Multiple lung nodules 1/26/2020    5mm, 9 mm RLL identified 1/2020, not present 10/2019.   • Myocardial infarction (HCC)    • Osteomyelitis ([facility]) 1/22/2020   • Osteomyelitis of fifth toe of right foot ([facility]) 10/7/2019   • Pancreatitis    • Persistent insomnia 1/20/2020   • Renal disorder    • Sleep apnea 2/6/2017   • Sleep apnea with use of continuous positive airway pressure (CPAP)    • Slow transit constipation 1/16/2019   • Spinal stenosis in cervical region 9/16/2021   • Vitamin D deficiency 3/2/2021       Allergies   Allergen Reactions   • Cefepime Hives and Anaphylaxis   • Bactrim [Sulfamethoxazole-Trimethoprim] Other (See Comments)     \"RENAL FAILURE\"   • Clindamycin Itching   • Vancomycin Itching   • Zolpidem Unknown - High Severity   • Metronidazole Rash       Past Surgical History:   Procedure Laterality Date   • ABDOMINAL SURGERY     • AMPUTATION FOOT / TOE Left 10/2021    5th digit    • APPENDECTOMY     • BACK SURGERY     • CARDIAC CATHETERIZATION Left 2/8/2021    Procedure: Left Heart Cath w poss intervention left anatomical snuff box acess;  Surgeon: Omkar Charles DO;  Location:  [facility] CATH INVASIVE LOCATION;  Service: " Cardiology;  Laterality: Left;   • CARDIAC SURGERY     • CATARACT EXTRACTION     • CERVICAL SPINE SURGERY     • COLONOSCOPY N/A 2017    Normal exam repeat in 5 years   • COLONOSCOPY N/A 2019    Mild acute inflammation   • COLONOSCOPY W/ POLYPECTOMY  2014    Hyperplastic polyp   • CORONARY ARTERY BYPASS GRAFT  10/2015   • ENDOSCOPY  2011    Gastritis with hemorrhage   • ENDOSCOPY N/A 2017    Normal exam   • ENDOSCOPY N/A 2019    Gastritis   • ENDOSCOPY N/A 2020    Non-erosive gastritis with hemorrhage   • ENDOSCOPY N/A 2/10/2021    Esophagitis   • INCISION AND DRAINAGE OF WOUND Left 2007    spider bite       Family History   Problem Relation Age of Onset   • Colon cancer Father    • Heart disease Father    • Colon cancer Sister    • Colon polyps Sister    • Alzheimer's disease Mother    • Coronary artery disease Sister    • Coronary artery disease Sister        Social History     Socioeconomic History   • Marital status:    Tobacco Use   • Smoking status: Former Smoker     Quit date:      Years since quittin.1   • Smokeless tobacco: Never Used   • Tobacco comment: smoked in highschool   Vaping Use   • Vaping Use: Never used   Substance and Sexual Activity   • Alcohol use: No   • Drug use: No   • Sexual activity: Defer           Objective   Physical Exam  Vitals and nursing note reviewed.   Constitutional:       Appearance: He is well-developed.   HENT:      Head: Normocephalic and atraumatic.   Eyes:      Extraocular Movements: Extraocular movements intact.      Pupils: Pupils are equal, round, and reactive to light.   Cardiovascular:      Rate and Rhythm: Normal rate and regular rhythm.   Pulmonary:      Effort: Pulmonary effort is normal.      Breath sounds: Normal breath sounds.   Abdominal:      General: Bowel sounds are normal.      Palpations: Abdomen is soft.      Tenderness: There is abdominal tenderness.      Comments: Patient is significantly obese.  He  is mildly tender to palpation diffusely but no signs of an acute abdomen.   Musculoskeletal:         General: Normal range of motion.      Cervical back: Normal range of motion and neck supple.   Skin:     General: Skin is warm and dry.      Capillary Refill: Capillary refill takes less than 2 seconds.   Neurological:      General: No focal deficit present.      Mental Status: He is alert and oriented to person, place, and time.   Psychiatric:         Mood and Affect: Mood normal.         Behavior: Behavior normal.         Procedures           ED Course  ED Course as of 03/05/22 0312   Sat Mar 05, 2022   0309 I told the patient his lab work looks okay.  Actually his white blood cell count is improved.  Sugar is little higher but certainly not a danger zone.  His lactic acid is normal and his procalcitonin is normal.  CT head does not show any acute changes.  CT abdomen shows some constipation but no signs of an acute abdomen.  Chest x-ray still does not show an infiltrate.  I told him that it does look like he has bronchitis and will just have to continue the current medications.  I did agree to give him some for the pain for a brief period of time.  He is discharged in stable condition. [TR]   0311 When I reviewed the patient's cast result he has had Norco prescribed earlier this month.  He has had oxycodone prescribed last month.  He had told me not had any type of chronic pain medication in quite some time.  Based on these I will not prescribe narcotics as I had intended for him.  He unfortunately has already gone home with the understanding that I would do so so if he calls back that would explain that.  He will need to talk to his regular doctor about any further pain medication. [TR]      ED Course User Index  [TR] Karel Arriaza Jr., MD KASPER reviewed by Karel Arriaza Jr., MD             MDM  Number of Diagnoses or Management Options  Acute nonintractable headache,  unspecified headache type: new and requires workup  Bronchitis: new and requires workup  Generalized abdominal pain: new and requires workup     Amount and/or Complexity of Data Reviewed  Clinical lab tests: ordered and reviewed  Tests in the radiology section of CPT®: reviewed and ordered  Tests in the medicine section of CPT®: reviewed and ordered    Risk of Complications, Morbidity, and/or Mortality  Presenting problems: moderate  Diagnostic procedures: moderate  Management options: moderate    Patient Progress  Patient progress: stable      Final diagnoses:   Bronchitis   Acute nonintractable headache, unspecified headache type   Generalized abdominal pain       ED Disposition  ED Disposition     ED Disposition   Discharge    Condition   --    Comment   --             No follow-up provider specified.       Medication List      No changes were made to your prescriptions during this visit.          Karel Arriaza Jr., MD  03/05/22 1544

## 2022-03-09 LAB
BACTERIA SPEC AEROBE CULT: NORMAL

## 2022-03-31 NOTE — ANESTHESIA PREPROCEDURE EVALUATION
Anesthesia Evaluation     Patient summary reviewed and Nursing notes reviewed   NPO Solid Status: > 8 hours  NPO Liquid Status: > 8 hours           Airway   Mallampati: I  TM distance: >3 FB  Neck ROM: full  No difficulty expected  Dental - normal exam     Pulmonary - normal exam   (+) sleep apnea on CPAP,   Cardiovascular - normal exam  Exercise tolerance: poor (<4 METS)    PT is on anticoagulation therapy    (+) hypertension well controlled less than 2 medications, past MI  >12 months, CAD, CABG >6 Months, hyperlipidemia,     ROS comment: Echo 10/2019:  ·Left ventricular systolic function is normal. Estimated EF appears to be in the range of 56 - 60%.  ·Left ventricular wall thickness is consistent with mild concentric hypertrophy.  ·Cardiac valves are not well visualized, however no significant abnormalities are identified by Doppler assessment.    Neuro/Psych  GI/Hepatic/Renal/Endo    (+) morbid obesity, GERD well controlled,  renal disease CRI, diabetes mellitus type 2 well controlled using insulin,     Musculoskeletal     (+) arthralgias,   Abdominal  - normal exam   Substance History - negative use     OB/GYN negative ob/gyn ROS         Other   arthritis,                    Anesthesia Plan    ASA 3 - emergent     MAC   total IV anesthesia  intravenous induction     Anesthetic plan, all risks, benefits, and alternatives have been provided, discussed and informed consent has been obtained with: patient and spouse/significant other.       Stable.

## 2022-04-21 ENCOUNTER — PREP FOR SURGERY (OUTPATIENT)
Dept: OTHER | Facility: HOSPITAL | Age: 67
End: 2022-04-21

## 2022-04-21 ENCOUNTER — OFFICE VISIT (OUTPATIENT)
Dept: NEUROSURGERY | Facility: CLINIC | Age: 67
End: 2022-04-21

## 2022-04-21 VITALS
BODY MASS INDEX: 42.66 KG/M2 | WEIGHT: 315 LBS | HEIGHT: 72 IN | DIASTOLIC BLOOD PRESSURE: 70 MMHG | SYSTOLIC BLOOD PRESSURE: 132 MMHG

## 2022-04-21 DIAGNOSIS — Z78.9 NONSMOKER: ICD-10-CM

## 2022-04-21 DIAGNOSIS — M54.12 CERVICAL RADICULOPATHY: Primary | ICD-10-CM

## 2022-04-21 DIAGNOSIS — E66.01 CLASS 3 SEVERE OBESITY DUE TO EXCESS CALORIES WITH BODY MASS INDEX (BMI) OF 45.0 TO 49.9 IN ADULT, UNSPECIFIED WHETHER SERIOUS COMORBIDITY PRESENT: ICD-10-CM

## 2022-04-21 DIAGNOSIS — M48.02 SPINAL STENOSIS IN CERVICAL REGION: ICD-10-CM

## 2022-04-21 DIAGNOSIS — M50.30 DEGENERATION OF CERVICAL INTERVERTEBRAL DISC: ICD-10-CM

## 2022-04-21 PROBLEM — M51.37 DEGENERATION OF LUMBAR OR LUMBOSACRAL INTERVERTEBRAL DISC: Status: ACTIVE | Noted: 2022-04-21

## 2022-04-21 PROBLEM — M51.379 DEGENERATION OF LUMBAR OR LUMBOSACRAL INTERVERTEBRAL DISC: Status: ACTIVE | Noted: 2022-04-21

## 2022-04-21 PROCEDURE — 99214 OFFICE O/P EST MOD 30 MIN: CPT | Performed by: NURSE PRACTITIONER

## 2022-04-21 NOTE — PROGRESS NOTES
Chief complaint:   Chief Complaint   Patient presents with   • Neck Pain     Erick returns for follow up for his neck pain and discussion for his treatment options.        Subjective     HPI: This is a 66-year-old male gentleman who we have been following for chronic neck pain with left greater than right upper extremity pain and numbness and tingling.  Patient did go through physical therapy without any improvement.  The patient has been working with his primary care doctor in regards to getting pain medication.  He has not had any injections in his neck.  The patient has had a previous neck surgery by Dr. Diana at C6-7.  He did well from that surgery.  He does have known adjacent level degeneration at C5-6 that is causing cord compression and bilateral foraminal narrowing.  We did have the patient set up for surgery however he did have osteomyelitis in his foot and was working with his podiatrist.  He has been cleared by his podiatrist from the infection and presents today to discuss surgical intervention.  Dr. Soliz has seen him in the past and discussed doing an adjacent level fusion at C5-6.  He continues to complain of neck pain and pain radiating into his arms bilaterally with the left being worse than the right.    Review of Systems   Constitutional: Positive for activity change.   Musculoskeletal: Positive for arthralgias, myalgias and neck pain.   Neurological: Positive for numbness.   Psychiatric/Behavioral: Negative.    All other systems reviewed and are negative.       Past Medical History:   Diagnosis Date   • Arthritis    • Autonomic disease    • CAD (coronary artery disease) 2/6/2017   • Cervical radiculopathy 9/16/2021   • Chronic constipation with acute exaccerbation 5/10/2021   • Coronary artery disease    • Degeneration of cervical intervertebral disc 8/11/2021   • Diabetes mellitus (HCC)    • Diabetic foot ulcer (HCC) 8/31/2020   • Diabetic polyneuropathy associated with type 2 diabetes  mellitus (HCC) 1/18/2021   • Gastroesophageal reflux disease 5/13/2019   • HTN (hypertension), benign 5/3/2017   • Hyperlipidemia    • Hypertension    • Mixed hyperlipidemia 2/7/2017   • Multiple lung nodules 1/26/2020    5mm, 9 mm RLL identified 1/2020, not present 10/2019.   • Myocardial infarction (HCC)    • Osteomyelitis (HCC) 1/22/2020   • Osteomyelitis of fifth toe of right foot (HCC) 10/7/2019   • Pancreatitis    • Persistent insomnia 1/20/2020   • Renal disorder    • Sleep apnea 2/6/2017   • Sleep apnea with use of continuous positive airway pressure (CPAP)    • Slow transit constipation 1/16/2019   • Spinal stenosis in cervical region 9/16/2021   • Vitamin D deficiency 3/2/2021     Past Surgical History:   Procedure Laterality Date   • ABDOMINAL SURGERY     • AMPUTATION FOOT / TOE Left 10/2021    5th digit    • APPENDECTOMY     • BACK SURGERY     • CARDIAC CATHETERIZATION Left 2/8/2021    Procedure: Left Heart Cath w poss intervention left anatomical snuff box acess;  Surgeon: Omkar Charles DO;  Location: [facility] CATH INVASIVE LOCATION;  Service: Cardiology;  Laterality: Left;   • CARDIAC SURGERY     • CATARACT EXTRACTION     • CERVICAL SPINE SURGERY     • COLONOSCOPY N/A 1/31/2017    Normal exam repeat in 5 years   • COLONOSCOPY N/A 2/11/2019    Mild acute inflammation   • COLONOSCOPY W/ POLYPECTOMY  03/04/2014    Hyperplastic polyp   • CORONARY ARTERY BYPASS GRAFT  10/2015   • ENDOSCOPY  04/13/2011    Gastritis with hemorrhage   • ENDOSCOPY N/A 5/5/2017    Normal exam   • ENDOSCOPY N/A 2/11/2019    Gastritis   • ENDOSCOPY N/A 9/1/2020    Non-erosive gastritis with hemorrhage   • ENDOSCOPY N/A 2/10/2021    Esophagitis   • INCISION AND DRAINAGE OF WOUND Left 09/2007    spider bite     Family History   Problem Relation Age of Onset   • Colon cancer Father    • Heart disease Father    • Colon cancer Sister    • Colon polyps Sister    • Alzheimer's disease Mother    • Coronary artery disease Sister  "   • Coronary artery disease Sister      Social History     Tobacco Use   • Smoking status: Former Smoker     Quit date:      Years since quittin.3   • Smokeless tobacco: Never Used   • Tobacco comment: smoked in highschool   Vaping Use   • Vaping Use: Never used   Substance Use Topics   • Alcohol use: No   • Drug use: No     (Not in a hospital admission)    Allergies:  Cefepime, Bactrim [sulfamethoxazole-trimethoprim], Clindamycin, Vancomycin, Zolpidem, and Metronidazole    Objective      Vital Signs  /70   Ht 182.9 cm (72\")   Wt (!) 155 kg (342 lb)   BMI 46.38 kg/m²     Physical Exam  Constitutional:       Appearance: Normal appearance. He is well-developed.   HENT:      Head: Normocephalic.   Eyes:      General: Lids are normal.      Extraocular Movements: EOM normal.      Conjunctiva/sclera: Conjunctivae normal.      Pupils: Pupils are equal, round, and reactive to light.   Cardiovascular:      Rate and Rhythm: Normal rate and regular rhythm.   Pulmonary:      Effort: Pulmonary effort is normal.      Breath sounds: Normal breath sounds.   Musculoskeletal:         General: Normal range of motion.      Cervical back: Normal range of motion.      Comments: Neck pain   Skin:     General: Skin is warm.   Neurological:      Mental Status: He is alert and oriented to person, place, and time.      GCS: GCS eye subscore is 4. GCS verbal subscore is 5. GCS motor subscore is 6.      Cranial Nerves: No cranial nerve deficit.      Sensory: No sensory deficit.      Gait: Gait is intact.      Deep Tendon Reflexes: Strength normal and reflexes are normal and symmetric. Reflexes normal.   Psychiatric:         Speech: Speech normal.         Behavior: Behavior normal.         Thought Content: Thought content normal.         Neurologic Exam     Mental Status   Oriented to person, place, and time.   Attention: normal. Concentration: normal.   Speech: speech is normal   Level of consciousness: alert  Normal " comprehension.     Cranial Nerves     CN II   Visual fields full to confrontation.     CN III, IV, VI   Pupils are equal, round, and reactive to light.  Extraocular motions are normal.     CN V   Facial sensation intact.     CN VII   Facial expression full, symmetric.     CN VIII   CN VIII normal.     CN IX, X   CN IX normal.   CN X normal.     CN XI   CN XI normal.     CN XII   CN XII normal.     Motor Exam   Muscle bulk: normal    Strength   Strength 5/5 throughout.     Sensory Exam   Light touch normal.     Gait, Coordination, and Reflexes     Gait  Gait: normal    Reflexes   Reflexes 2+ except as noted.       Imaging review: MRI of the cervical spine that was done on July 21, 2021 shows a previous fusion at C6-7.  At C5-6 there is adjacent level degeneration and disc protrusion with cord compression and bilateral foraminal narrowing with the left being worse than the right.  There is small central disc protrusions at C3-4 and C4-5 however no cord compression and no foraminal narrowing.  No fracture visualized.        Assessment/Plan: Dr. Soliz did review the imaging and did come in and discussed this with the patient.  It is felt the patient would benefit by going to the operating room for an extension of the cervical fusion to C5-6.  Dr. Soliz to go over the risk and benefits of the procedure with the patient.  Please see his addendum on this patient.  We will get the patient referred to ENT for an evaluation for surgery in regards to having a previous ACDF.  We will have him cleared by his primary care doctor.  We will set him up for lab work and COVID testing.  He was told to call us if any further problems or concerns.  Their questions and concerns were addressed.  We will also order a cervical collar for the patient as well.      Patient is a nonsmoker  The patient's Body mass index is 46.38 kg/m².. BMI is above normal parameters. Recommendations include: educational material and nutrition  counseling  Advance Care Planning   ACP discussion was held with the patient during this visit. Patient does not have an advance directive, information provided.      Diagnoses and all orders for this visit:    1. Cervical radiculopathy (Primary)  -     Ambulatory Referral to Internal Medicine  -     Ambulatory Referral to ENT (Otolaryngology)  -     Miscellaneous DME    2. Degeneration of cervical intervertebral disc  -     Ambulatory Referral to Internal Medicine  -     Ambulatory Referral to ENT (Otolaryngology)  -     Miscellaneous DME    3. Spinal stenosis in cervical region  -     Ambulatory Referral to Internal Medicine  -     Ambulatory Referral to ENT (Otolaryngology)  -     Miscellaneous DME    4. Nonsmoker    5. Class 3 severe obesity due to excess calories with body mass index (BMI) of 45.0 to 49.9 in adult, unspecified whether serious comorbidity present (HCC)          I discussed the patients findings and my recommendations with patient    Willy Romero, APRN  04/21/22  15:52 CDT  Attending addendum: 66-year-old gentleman we will follow him for cervical myelopathy and bilateral upper extremity radiculopathy along with neck pain.  He is gone through extensive conservative care course without any improvement.  We did have him scheduled for surgery however he developed osteomyelitis of his foot.  He was taken care of by his podiatrist and had the foot amputated.  Has been off antibiotics now for several weeks and is been cleared from an infection point of view.  He still complains of neck pain and bilateral upper extremity radicular pain numbness and tingling.  He still has some clinical signs of myelopathy.  His MRI shows severe cervical foraminal stenosis and cord compression at C5-6 above his previous fusion at C6-7.  We will recommend a anterior cervical fusion at C5-6.  The risk and benefits were discussed at length which include were not limited to infection, bleeding, speech and swallow  difficulty, paralysis, spinal fluid leak, bowel bladder incontinence, stroke, coma, and death.  The patient acknowledged understanding this.  His questions and concerns were addressed.  We will get him preop and schedule the soonest possible.

## 2022-04-21 NOTE — PATIENT INSTRUCTIONS
"BMI for Adults  What is BMI?  Body mass index (BMI) is a number that is calculated from a person's weight and height. BMI can help estimate how much of a person's weight is composed of fat. BMI does not measure body fat directly. Rather, it is an alternative to procedures that directly measure body fat, which can be difficult and expensive.  BMI can help identify people who may be at higher risk for certain medical problems.  What are BMI measurements used for?  BMI is used as a screening tool to identify possible weight problems. It helps determine whether a person is obese, overweight, a healthy weight, or underweight.  BMI is useful for:  Identifying a weight problem that may be related to a medical condition or may increase the risk for medical problems.  Promoting changes, such as changes in diet and exercise, to help reach a healthy weight. BMI screening can be repeated to see if these changes are working.  How is BMI calculated?  BMI involves measuring your weight in relation to your height. Both height and weight are measured, and the BMI is calculated from those numbers. This can be done either in English (U[place] or metric measurements. Note that charts and online BMI calculators are available to help you find your BMI quickly and easily without having to do these calculations yourself.  To calculate your BMI in English (U.S[place] measurements:    Measure your weight in pounds (lb).  Multiply the number of pounds by 703.  For example, for a person who weighs 180 lb, multiply that number by 703, which equals 126,540.  Measure your height in inches. Then multiply that number by itself to get a measurement called \"inches squared.\"  For example, for a person who is 70 inches tall, the \"inches squared\" measurement is 70 inches x 70 inches, which equals 4,900 inches squared.  Divide the total from step 2 (number of lb x 703) by the total from step 3 (inches squared): 126,540 ÷ 4,900 = 25.8. This is your BMI.    To " "calculate your BMI in metric measurements:  Measure your weight in kilograms (kg).  Measure your height in meters (m). Then multiply that number by itself to get a measurement called \"meters squared.\"  For example, for a person who is 1.75 m tall, the \"meters squared\" measurement is 1.75 m x 1.75 m, which is equal to 3.1 meters squared.  Divide the number of kilograms (your weight) by the meters squared number. In this example: 70 ÷ 3.1 = 22.6. This is your BMI.  What do the results mean?  BMI charts are used to identify whether you are underweight, normal weight, overweight, or obese. The following guidelines will be used:  Underweight: BMI less than 18.5.  Normal weight: BMI between 18.5 and 24.9.  Overweight: BMI between 25 and 29.9.  Obese: BMI of 30 or above.  Keep these notes in mind:  Weight includes both fat and muscle, so someone with a muscular build, such as an athlete, may have a BMI that is higher than 24.9. In cases like these, BMI is not an accurate measure of body fat.  To determine if excess body fat is the cause of a BMI of 25 or higher, further assessments may need to be done by a health care provider.  BMI is usually interpreted in the same way for men and women.  Where to find more information  For more information about BMI, including tools to quickly calculate your BMI, go to these websites:  Centers for Disease Control and Prevention: www.cdc.gov  American Heart Association: www.heart.org  National Heart, Lung, and Blood [place]: www.nhlbi.nih.gov  Summary  Body mass index (BMI) is a number that is calculated from a person's weight and height.  BMI may help estimate how much of a person's weight is composed of fat. BMI can help identify those who may be at higher risk for certain medical problems.  BMI can be measured using English measurements or metric measurements.  BMI charts are used to identify whether you are underweight, normal weight, overweight, or obese.  This information is not " "intended to replace advice given to you by your health care provider. Make sure you discuss any questions you have with your health care provider.  Document Revised: 09/09/2020 Document Reviewed: 07/17/2020  [employer] Patient Education © 2021 [employer]  https://www.nhlbi.nih.gov/files/docs/public/heart/dash_brief.pdf\">   DASH Eating Plan  DASH stands for Dietary Approaches to Stop Hypertension. The DASH eating plan is a healthy eating plan that has been shown to:  Reduce high blood pressure (hypertension).  Reduce your risk for type 2 diabetes, heart disease, and stroke.  Help with weight loss.  What are tips for following this plan?  Reading food labels  Check food labels for the amount of salt (sodium) per serving. Choose foods with less than 5 percent of the Daily Value of sodium. Generally, foods with less than 300 milligrams (mg) of sodium per serving fit into this eating plan.  To find whole grains, look for the word \"whole\" as the first word in the ingredient list.  Shopping  Buy products labeled as \"low-sodium\" or \"no salt added.\"  Buy fresh foods. Avoid canned foods and pre-made or frozen meals.  Cooking  Avoid adding salt when cooking. Use salt-free seasonings or herbs instead of table salt or sea salt. Check with your health care provider or pharmacist before using salt substitutes.  Do not vera foods. Cook foods using healthy methods such as baking, boiling, grilling, roasting, and broiling instead.  Cook with heart-healthy oils, such as olive, canola, avocado, soybean, or sunflower oil.  Meal planning    Eat a balanced diet that includes:  4 or more servings of fruits and 4 or more servings of vegetables each day. Try to fill one-half of your plate with fruits and vegetables.  6-8 servings of whole grains each day.  Less than 6 oz (170 g) of lean meat, poultry, or fish each day. A 3-oz (85-g) serving of meat is about the same size as a deck of cards. One egg equals 1 oz (28 g).  2-3 servings of " low-fat dairy each day. One serving is 1 cup (237 mL).  1 serving of nuts, seeds, or beans 5 times each week.  2-3 servings of heart-healthy fats. Healthy fats called omega-3 fatty acids are found in foods such as walnuts, flaxseeds, fortified milks, and eggs. These fats are also found in cold-water fish, such as sardines, salmon, and mackerel.  Limit how much you eat of:  Canned or prepackaged foods.  Food that is high in trans fat, such as some fried foods.  Food that is high in saturated fat, such as fatty meat.  Desserts and other sweets, sugary drinks, and other foods with added sugar.  Full-fat dairy products.  Do not salt foods before eating.  Do not eat more than 4 egg yolks a week.  Try to eat at least 2 vegetarian meals a week.  Eat more home-cooked food and less restaurant, buffet, and fast food.    Lifestyle  When eating at a restaurant, ask that your food be prepared with less salt or no salt, if possible.  If you drink alcohol:  Limit how much you use to:  0-1 drink a day for women who are not pregnant.  0-2 drinks a day for men.  Be aware of how much alcohol is in your drink. In the [place] one drink equals one 12 oz bottle of beer (355 mL), one 5 oz glass of wine (148 mL), or one 1½ oz glass of hard liquor (44 mL).  General information  Avoid eating more than 2,300 mg of salt a day. If you have hypertension, you may need to reduce your sodium intake to 1,500 mg a day.  Work with your health care provider to maintain a healthy body weight or to lose weight. Ask what an ideal weight is for you.  Get at least 30 minutes of exercise that causes your heart to beat faster (aerobic exercise) most days of the week. Activities may include walking, swimming, or biking.  Work with your health care provider or dietitian to adjust your eating plan to your individual calorie needs.  What foods should I eat?  Fruits  All fresh, dried, or frozen fruit. Canned fruit in natural juice (without added  sugar).  Vegetables  Fresh or frozen vegetables (raw, steamed, roasted, or grilled). Low-sodium or reduced-sodium tomato and vegetable juice. Low-sodium or reduced-sodium tomato sauce and tomato paste. Low-sodium or reduced-sodium canned vegetables.  Grains  Whole-grain or whole-wheat bread. Whole-grain or whole-wheat pasta. Brown rice. Oatmeal. Quinoa. Bulgur. Whole-grain and low-sodium cereals. Radha bread. Low-fat, low-sodium crackers. Whole-wheat flour tortillas.  Meats and other proteins  Skinless chicken or turkey. Ground chicken or turkey. Pork with fat trimmed off. Fish and seafood. Egg whites. Dried beans, peas, or lentils. Unsalted nuts, nut butters, and seeds. Unsalted canned beans. Lean cuts of beef with fat trimmed off. Low-sodium, lean precooked or cured meat, such as sausages or meat loaves.  Dairy  Low-fat (1%) or fat-free (skim) milk. Reduced-fat, low-fat, or fat-free cheeses. Nonfat, low-sodium ricotta or cottage cheese. Low-fat or nonfat yogurt. Low-fat, low-sodium cheese.  Fats and oils  Soft margarine without trans fats. Vegetable oil. Reduced-fat, low-fat, or light mayonnaise and salad dressings (reduced-sodium). Canola, safflower, olive, avocado, soybean, and sunflower oils. Avocado.  Seasonings and condiments  Herbs. Spices. Seasoning mixes without salt.  Other foods  Unsalted popcorn and pretzels. Fat-free sweets.  The items listed above may not be a complete list of foods and beverages you can eat. Contact a dietitian for more information.  What foods should I avoid?  Fruits  Canned fruit in a light or heavy syrup. Fried fruit. Fruit in cream or butter sauce.  Vegetables  Creamed or fried vegetables. Vegetables in a cheese sauce. Regular canned vegetables (not low-sodium or reduced-sodium). Regular canned tomato sauce and paste (not low-sodium or reduced-sodium). Regular tomato and vegetable juice (not low-sodium or reduced-sodium). Pickles. Olives.  Grains  Baked goods made with fat, such  as croissants, muffins, or some breads. Dry pasta or rice meal packs.  Meats and other proteins  Fatty cuts of meat. Ribs. Fried meat. Morfin. Bologna, salami, and other precooked or cured meats, such as sausages or meat loaves. Fat from the back of a pig (fatback). Bratwurst. Salted nuts and seeds. Canned beans with added salt. Canned or smoked fish. Whole eggs or egg yolks. Chicken or turkey with skin.  Dairy  Whole or 2% milk, cream, and half-and-half. Whole or full-fat cream cheese. Whole-fat or sweetened yogurt. Full-fat cheese. Nondairy creamers. Whipped toppings. Processed cheese and cheese spreads.  Fats and oils  Butter. Stick margarine. Lard. Shortening. Ghee. Morfin fat. Tropical oils, such as coconut, palm kernel, or palm oil.  Seasonings and condiments  Onion salt, garlic salt, seasoned salt, table salt, and sea salt. Worcestershire sauce. Tartar sauce. Barbecue sauce. Teriyaki sauce. Soy sauce, including reduced-sodium. Steak sauce. Canned and packaged gravies. Fish sauce. Oyster sauce. Cocktail sauce. Store-bought horseradish. Ketchup. Mustard. Meat flavorings and tenderizers. Bouillon cubes. Hot sauces. Pre-made or packaged marinades. Pre-made or packaged taco seasonings. Relishes. Regular salad dressings.  Other foods  Salted popcorn and pretzels.  The items listed above may not be a complete list of foods and beverages you should avoid. Contact a dietitian for more information.  Where to find more information  National Heart, Lung, and Blood [place]: www.nhlbi.nih.gov  American Heart Association: www.heart.org  Academy of Nutrition and Dietetics: www.eatright.org  National Kidney Foundation: www.kidney.org  Summary  The DASH eating plan is a healthy eating plan that has been shown to reduce high blood pressure (hypertension). It may also reduce your risk for type 2 diabetes, heart disease, and stroke.  When on the DASH eating plan, aim to eat more fresh fruits and vegetables, whole grains, lean  proteins, low-fat dairy, and heart-healthy fats.  With the DASH eating plan, you should limit salt (sodium) intake to 2,300 mg a day. If you have hypertension, you may need to reduce your sodium intake to 1,500 mg a day.  Work with your health care provider or dietitian to adjust your eating plan to your individual calorie needs.  This information is not intended to replace advice given to you by your health care provider. Make sure you discuss any questions you have with your health care provider.  Document Revised: 11/20/2020 Document Reviewed: 11/20/2020  [employer] Patient Education © 2021 [employer]    Advance Care Planning and Advance Directives     You make decisions on a daily basis - decisions about where you want to live, your career, your home, your life. Perhaps one of the most important decisions you face is your choice for future medical care. Take time to talk with your family and your healthcare team and start planning today.  Advance Care Planning is a process that can help you:  Understand possible future healthcare decisions in light of your own experiences  Reflect on those decision in light of your goals and values  Discuss your decisions with those closest to you and the healthcare professionals that care for you  Make a plan by creating a document that reflects your wishes    Surrogate Decision Maker  In the event of a medical emergency, which has left you unable to communicate or to make your own decisions, you would need someone to make decisions for you.  It is important to discuss your preferences for medical treatment with this person while you are in good health.     Qualities of a surrogate decision maker:  Willing to take on this role and responsibility  Knows what you want for future medical care  Willing to follow your wishes even if they don't agree with them  Able to make difficult medical decisions under stressful circumstances    Advance Directives  These are legal documents you  can create that will guide your healthcare team and decision maker(s) when needed. These documents can be stored in the electronic medical record.    Living Will - a legal document to guide your care if you have a terminal condition or a serious illness and are unable to communicate. States vary by statute in document names/types, but most forms may include one or more of the following:        -  Directions regarding life-prolonging treatments        -  Directions regarding artificially provided nutrition/hydration        -  Choosing a healthcare decision maker        -  Direction regarding organ/tissue donation    Durable Power of  for Healthcare - this document names an -in-fact to make medical decisions for you, but it may also allow this person to make personal and financial decisions for you. Please seek the advice of an  if you need this type of document.    **Advance Directives are not required and no one may discriminate against you if you do not sign one.    Medical Orders  Many states allow specific forms/orders signed by your physician to record your wishes for medical treatment in your current state of health. This form, signed in personal communication with your physician, addresses resuscitation and other medical interventions that you may or may not want.      For more information or to schedule a time with a [facility] Advance Care Planning Facilitator contact: [email or web address] or call [phone] and someone will contact you directly.

## 2022-04-29 DIAGNOSIS — Z01.818 PREOPERATIVE TESTING: Primary | ICD-10-CM

## 2022-05-20 ENCOUNTER — TELEPHONE (OUTPATIENT)
Dept: OTOLARYNGOLOGY | Facility: CLINIC | Age: 67
End: 2022-05-20

## 2022-05-20 ENCOUNTER — APPOINTMENT (OUTPATIENT)
Dept: LAB | Facility: HOSPITAL | Age: 67
End: 2022-05-20

## 2022-05-20 NOTE — TELEPHONE ENCOUNTER
Called and left patient voicemail letting him know that the appointment is to evaluate the vocal cords and airway and he will need to have the covid test prior to coming in or we will need to reschedule his appointment to another date.

## 2022-05-23 ENCOUNTER — LAB (OUTPATIENT)
Dept: LAB | Facility: HOSPITAL | Age: 67
End: 2022-05-23

## 2022-05-23 ENCOUNTER — HOSPITAL ENCOUNTER (EMERGENCY)
Facility: HOSPITAL | Age: 67
Discharge: HOME | End: 2022-05-24
Attending: EMERGENCY MEDICINE | Admitting: EMERGENCY MEDICINE

## 2022-05-23 ENCOUNTER — APPOINTMENT (OUTPATIENT)
Dept: GENERAL RADIOLOGY | Facility: HOSPITAL | Age: 67
End: 2022-05-23

## 2022-05-23 DIAGNOSIS — U07.1 COVID-19: Primary | ICD-10-CM

## 2022-05-23 DIAGNOSIS — Z01.818 PREOPERATIVE TESTING: ICD-10-CM

## 2022-05-23 LAB — SARS-COV-2 ORF1AB RESP QL NAA+PROBE: DETECTED

## 2022-05-23 PROCEDURE — C9803 HOPD COVID-19 SPEC COLLECT: HCPCS

## 2022-05-23 PROCEDURE — 93005 ELECTROCARDIOGRAM TRACING: CPT | Performed by: EMERGENCY MEDICINE

## 2022-05-23 PROCEDURE — 71045 X-RAY EXAM CHEST 1 VIEW: CPT

## 2022-05-23 PROCEDURE — 99283 EMERGENCY DEPT VISIT LOW MDM: CPT

## 2022-05-23 PROCEDURE — U0004 COV-19 TEST NON-CDC HGH THRU: HCPCS

## 2022-05-23 RX ORDER — SODIUM CHLORIDE 0.9 % (FLUSH) 0.9 %
10 SYRINGE (ML) INJECTION AS NEEDED
Status: DISCONTINUED | OUTPATIENT
Start: 2022-05-23 | End: 2022-05-24 | Stop reason: HOSPADM

## 2022-05-24 VITALS
TEMPERATURE: 98.9 F | BODY MASS INDEX: 40.09 KG/M2 | HEART RATE: 90 BPM | SYSTOLIC BLOOD PRESSURE: 147 MMHG | DIASTOLIC BLOOD PRESSURE: 67 MMHG | WEIGHT: 296 LBS | HEIGHT: 72 IN | OXYGEN SATURATION: 97 % | RESPIRATION RATE: 21 BRPM

## 2022-05-24 LAB
ALBUMIN SERPL-MCNC: 4.2 G/DL (ref 3.5–5.2)
ALBUMIN/GLOB SERPL: 1.4 G/DL
ALP SERPL-CCNC: 92 U/L (ref 39–117)
ALT SERPL W P-5'-P-CCNC: 15 U/L (ref 1–41)
ANION GAP SERPL CALCULATED.3IONS-SCNC: 10 MMOL/L (ref 5–15)
AST SERPL-CCNC: 19 U/L (ref 1–40)
BASOPHILS # BLD AUTO: 0.05 10*3/MM3 (ref 0–0.2)
BASOPHILS NFR BLD AUTO: 0.7 % (ref 0–1.5)
BILIRUB SERPL-MCNC: 0.4 MG/DL (ref 0–1.2)
BUN SERPL-MCNC: 35 MG/DL (ref 8–23)
BUN/CREAT SERPL: 17.3 (ref 7–25)
CALCIUM SPEC-SCNC: 9.6 MG/DL (ref 8.6–10.5)
CHLORIDE SERPL-SCNC: 98 MMOL/L (ref 98–107)
CO2 SERPL-SCNC: 30 MMOL/L (ref 22–29)
CREAT SERPL-MCNC: 2.02 MG/DL (ref 0.76–1.27)
D DIMER PPP FEU-MCNC: 0.76 MCGFEU/ML (ref 0–0.5)
D-LACTATE SERPL-SCNC: 1.3 MMOL/L (ref 0.5–2)
DEPRECATED RDW RBC AUTO: 43.2 FL (ref 37–54)
EGFRCR SERPLBLD CKD-EPI 2021: 35.9 ML/MIN/1.73
EOSINOPHIL # BLD AUTO: 0.28 10*3/MM3 (ref 0–0.4)
EOSINOPHIL NFR BLD AUTO: 3.7 % (ref 0.3–6.2)
ERYTHROCYTE [DISTWIDTH] IN BLOOD BY AUTOMATED COUNT: 13.8 % (ref 12.3–15.4)
GLOBULIN UR ELPH-MCNC: 2.9 GM/DL
GLUCOSE SERPL-MCNC: 65 MG/DL (ref 65–99)
HCT VFR BLD AUTO: 37.6 % (ref 37.5–51)
HGB BLD-MCNC: 12.7 G/DL (ref 13–17.7)
IMM GRANULOCYTES # BLD AUTO: 0.04 10*3/MM3 (ref 0–0.05)
IMM GRANULOCYTES NFR BLD AUTO: 0.5 % (ref 0–0.5)
LYMPHOCYTES # BLD AUTO: 0.71 10*3/MM3 (ref 0.7–3.1)
LYMPHOCYTES NFR BLD AUTO: 9.5 % (ref 19.6–45.3)
MAGNESIUM SERPL-MCNC: 1.9 MG/DL (ref 1.6–2.4)
MCH RBC QN AUTO: 28.9 PG (ref 26.6–33)
MCHC RBC AUTO-ENTMCNC: 33.8 G/DL (ref 31.5–35.7)
MCV RBC AUTO: 85.5 FL (ref 79–97)
MONOCYTES # BLD AUTO: 1.13 10*3/MM3 (ref 0.1–0.9)
MONOCYTES NFR BLD AUTO: 15.1 % (ref 5–12)
NEUTROPHILS NFR BLD AUTO: 5.28 10*3/MM3 (ref 1.7–7)
NEUTROPHILS NFR BLD AUTO: 70.5 % (ref 42.7–76)
NRBC BLD AUTO-RTO: 0 /100 WBC (ref 0–0.2)
NT-PROBNP SERPL-MCNC: 1639 PG/ML (ref 0–900)
PLATELET # BLD AUTO: 193 10*3/MM3 (ref 140–450)
PMV BLD AUTO: 11.1 FL (ref 6–12)
POTASSIUM SERPL-SCNC: 4.2 MMOL/L (ref 3.5–5.2)
PROT SERPL-MCNC: 7.1 G/DL (ref 6–8.5)
QT INTERVAL: 356 MS
QTC INTERVAL: 426 MS
RBC # BLD AUTO: 4.4 10*6/MM3 (ref 4.14–5.8)
SODIUM SERPL-SCNC: 138 MMOL/L (ref 136–145)
TROPONIN T SERPL-MCNC: <0.01 NG/ML (ref 0–0.03)
WBC NRBC COR # BLD: 7.49 10*3/MM3 (ref 3.4–10.8)

## 2022-05-24 PROCEDURE — 87040 BLOOD CULTURE FOR BACTERIA: CPT | Performed by: EMERGENCY MEDICINE

## 2022-05-24 PROCEDURE — 85025 COMPLETE CBC W/AUTO DIFF WBC: CPT | Performed by: EMERGENCY MEDICINE

## 2022-05-24 PROCEDURE — 83605 ASSAY OF LACTIC ACID: CPT | Performed by: EMERGENCY MEDICINE

## 2022-05-24 PROCEDURE — 83735 ASSAY OF MAGNESIUM: CPT | Performed by: EMERGENCY MEDICINE

## 2022-05-24 PROCEDURE — 83880 ASSAY OF NATRIURETIC PEPTIDE: CPT | Performed by: EMERGENCY MEDICINE

## 2022-05-24 PROCEDURE — 80053 COMPREHEN METABOLIC PANEL: CPT | Performed by: EMERGENCY MEDICINE

## 2022-05-24 PROCEDURE — 93005 ELECTROCARDIOGRAM TRACING: CPT | Performed by: EMERGENCY MEDICINE

## 2022-05-24 PROCEDURE — 85379 FIBRIN DEGRADATION QUANT: CPT | Performed by: EMERGENCY MEDICINE

## 2022-05-24 PROCEDURE — 93010 ELECTROCARDIOGRAM REPORT: CPT | Performed by: INTERNAL MEDICINE

## 2022-05-24 PROCEDURE — 84484 ASSAY OF TROPONIN QUANT: CPT | Performed by: EMERGENCY MEDICINE

## 2022-05-29 LAB
BACTERIA SPEC AEROBE CULT: NORMAL
BACTERIA SPEC AEROBE CULT: NORMAL

## 2022-06-09 ENCOUNTER — TRANSCRIBE ORDERS (OUTPATIENT)
Dept: ADMINISTRATIVE | Facility: HOSPITAL | Age: 67
End: 2022-06-09

## 2022-06-09 DIAGNOSIS — E11.42 TYPE 2 DIABETES MELLITUS WITH DIABETIC POLYNEUROPATHY, UNSPECIFIED WHETHER LONG TERM INSULIN USE: Primary | ICD-10-CM

## 2022-06-26 NOTE — PROGRESS NOTES
Timbo Zhou MD  [facility] EAR NOSE & THROAT  [address], [address]  [facility] [address]  Fax [phone]  Phone [phone]      Visit Type: NEW PATIENT   Chief Complaint   Patient presents with   • Sore Throat        HPI  Erick Luong is a 67 y.o. male who presents for evaluation of his vocal cords.  He is status post previous spinal surgery by Dr. Juan Diana and he is being considered for a revision surgery by Dr. Soliz.  They wanted evaluation of his vocal cords to make sure there was no vocal cord paralysis or paresis before engaging in the surgery.  He did not have problems after his first surgery.  Did not have hoarseness or trouble swallowing.    Past Medical History:   Diagnosis Date   • Arthritis    • Autonomic disease    • CAD (coronary artery disease) 2/6/2017   • Cervical radiculopathy 9/16/2021   • Chronic constipation with acute exaccerbation 5/10/2021   • Coronary artery disease    • Degeneration of cervical intervertebral disc 8/11/2021   • Diabetes mellitus (HCC)    • Diabetic foot ulcer (HCC) 8/31/2020   • Diabetic polyneuropathy associated with type 2 diabetes mellitus (HCC) 1/18/2021   • Gastroesophageal reflux disease 5/13/2019   • HTN (hypertension), benign 5/3/2017   • Hyperlipidemia    • Hypertension    • Mixed hyperlipidemia 2/7/2017   • Multiple lung nodules 1/26/2020    5mm, 9 mm RLL identified 1/2020, not present 10/2019.   • Myocardial infarction (HCC)    • Osteomyelitis (HCC) 1/22/2020   • Osteomyelitis of fifth toe of right foot (HCC) 10/7/2019   • Pancreatitis    • Persistent insomnia 1/20/2020   • Renal disorder    • Sleep apnea 2/6/2017   • Sleep apnea with use of continuous positive airway pressure (CPAP)    • Slow transit constipation 1/16/2019   • Spinal stenosis in cervical region 9/16/2021   • Vitamin D deficiency 3/2/2021       Past Surgical History:   Procedure Laterality Date   • ABDOMINAL SURGERY      • AMPUTATION FOOT / TOE Left 10/2021    5th digit    • APPENDECTOMY     • BACK SURGERY     • CARDIAC CATHETERIZATION Left 2/8/2021    Procedure: Left Heart Cath w poss intervention left anatomical snuff box aceflora;  Surgeon: Omkar Charles DO;  Location:  [facility] CATH INVASIVE LOCATION;  Service: Cardiology;  Laterality: Left;   • CARDIAC SURGERY     • CATARACT EXTRACTION     • CERVICAL SPINE SURGERY     • COLONOSCOPY N/A 1/31/2017    Normal exam repeat in 5 years   • COLONOSCOPY N/A 2/11/2019    Mild acute inflammation   • COLONOSCOPY W/ POLYPECTOMY  03/04/2014    Hyperplastic polyp   • CORONARY ARTERY BYPASS GRAFT  10/2015   • ENDOSCOPY  04/13/2011    Gastritis with hemorrhage   • ENDOSCOPY N/A 5/5/2017    Normal exam   • ENDOSCOPY N/A 2/11/2019    Gastritis   • ENDOSCOPY N/A 9/1/2020    Non-erosive gastritis with hemorrhage   • ENDOSCOPY N/A 2/10/2021    Esophagitis   • INCISION AND DRAINAGE OF WOUND Left 09/2007    spider bite       Family History: His family history includes Alzheimer's disease in his mother; Colon cancer in his father and sister; Colon polyps in his sister; Coronary artery disease in his sister and sister; Heart disease in his father.     Social History: He  reports that he quit smoking about 31 years ago. He has never used smokeless tobacco. He reports that he does not drink alcohol and does not use drugs.    Home Medications:  DULoxetine, Delafloxacin Meglumine, Dulaglutide, Finerenone, HYDROcodone-acetaminophen, HYDROcodone-homatropine, Heparin Lock Flush, Insulin Lispro (1 Unit Dial), Insulin Pen Needle, Insulin Regular Human (Conc), Sodium Chloride Flush, Zinc Sulfate, ascorbic acid, aspirin, azithromycin, benzonatate, brompheniramine-pseudoephedrine-DM, bumetanide, busPIRone, carvedilol, colchicine, cyclobenzaprine, donepezil, fluticasone, gabapentin, insulin aspart, ipratropium-albuterol, oxyCODONE-acetaminophen, pantoprazole, prazosin, pregabalin, rosuvastatin, sodium  hypochlorite, spironolactone, tamsulosin, traZODone, vitamin D, and vitamin D3    Allergies:  He is allergic to cefepime, bactrim [sulfamethoxazole-trimethoprim], clindamycin, vancomycin, zolpidem, and metronidazole.       Vital Signs:   Temp:  [98.2 °F (36.8 °C)] 98.2 °F (36.8 °C)  Heart Rate:  [78] 78  ENT Physical Exam  Constitutional  Appearance: patient appears well-developed, obesity noted,  Communication/Voice: communication appropriate for developmental age; vocal quality normal;  Head and Face  Appearance: head appears normal, face appears normal and face appears atraumatic;  Palpation: facial palpation normal;  Salivary: glands normal;  Ear  Hearing: intact;  Auricles: right auricle normal; left auricle normal;  External Mastoids: right external mastoid normal; left external mastoid normal;  Nose  External Nose: nares patent bilaterally; external nose normal;  Oral Cavity/Oropharynx  Lips: normal;  Neck  Neck: neck normal;  Respiratory  Inspection: breathing unlabored; normal breathing rate;  Cardiovascular  Inspection: extremities are warm and well perfused; no peripheral edema present;  Neurovestibular  Mental Status: alert and oriented;  Psychiatric: mood normal; affect is appropriate;     Flexible laryngoscopy    Date/Time: 6/30/2022 11:44 AM  Performed by: Timbo Zhou MD  Authorized by: Timbo Zhou MD     Consent:     Consent obtained:  Verbal    Consent given by:  Patient    Alternatives discussed:  No treatment and observation  Anesthesia (see MAR for exact dosages):     Anesthesia method:  Topical application    Topical anesthetic:  Tetracaine  Procedure details:     Indications: hoarseness, dysphagia, or aspiration      Medication:  Afrin    Instrument: flexible fiberoptic laryngoscope      Scope location: right nare    Larynx/ Hypopharynx:     Arytenoids: inflammation and interarytenoid edema      Hypopharynx: normal      Pyriform sinus: normal      False vocal cords: normal       True vocal cords: inflammation      True vocal cords: no immobility, no lesion and no nodules    Post-procedure details:     Patient tolerance of procedure:  Tolerated well       Result Review    RESULTS REVIEW    I have reviewed the patients old records in the chart.     Assessment & Plan    Diagnoses and all orders for this visit:    1. Spinal stenosis in cervical region (Primary)    2. Cervical radiculopathy    3. Degeneration of cervical intervertebral disc    4. Diabetic polyneuropathy associated with type 2 diabetes mellitus (HCC)    5. Obesity, unspecified obesity severity, unspecified obesity type    Other orders  -     $ Laryngoscopy                  Return if symptoms worsen or fail to improve.      Timbo Zhou MD  06/30/22  11:45 CDT

## 2022-06-27 ENCOUNTER — LAB (OUTPATIENT)
Dept: LAB | Facility: HOSPITAL | Age: 67
End: 2022-06-27

## 2022-06-27 DIAGNOSIS — Z01.818 PREOPERATIVE TESTING: ICD-10-CM

## 2022-06-27 DIAGNOSIS — Z01.818 PREOPERATIVE TESTING: Primary | ICD-10-CM

## 2022-06-27 LAB — SARS-COV-2 ORF1AB RESP QL NAA+PROBE: NOT DETECTED

## 2022-06-27 PROCEDURE — C9803 HOPD COVID-19 SPEC COLLECT: HCPCS

## 2022-06-27 PROCEDURE — U0004 COV-19 TEST NON-CDC HGH THRU: HCPCS

## 2022-06-30 ENCOUNTER — OFFICE VISIT (OUTPATIENT)
Dept: OTOLARYNGOLOGY | Facility: CLINIC | Age: 67
End: 2022-06-30

## 2022-06-30 VITALS — WEIGHT: 315 LBS | BODY MASS INDEX: 42.66 KG/M2 | HEART RATE: 78 BPM | HEIGHT: 72 IN | TEMPERATURE: 98.2 F

## 2022-06-30 DIAGNOSIS — M54.12 CERVICAL RADICULOPATHY: ICD-10-CM

## 2022-06-30 DIAGNOSIS — M48.02 SPINAL STENOSIS IN CERVICAL REGION: Primary | ICD-10-CM

## 2022-06-30 DIAGNOSIS — E11.42 DIABETIC POLYNEUROPATHY ASSOCIATED WITH TYPE 2 DIABETES MELLITUS: ICD-10-CM

## 2022-06-30 DIAGNOSIS — E66.9 OBESITY, UNSPECIFIED OBESITY SEVERITY, UNSPECIFIED OBESITY TYPE: ICD-10-CM

## 2022-06-30 DIAGNOSIS — M50.30 DEGENERATION OF CERVICAL INTERVERTEBRAL DISC: ICD-10-CM

## 2022-06-30 PROCEDURE — 99202 OFFICE O/P NEW SF 15 MIN: CPT | Performed by: OTOLARYNGOLOGY

## 2022-06-30 PROCEDURE — 31575 DIAGNOSTIC LARYNGOSCOPY: CPT | Performed by: OTOLARYNGOLOGY

## 2022-07-01 ENCOUNTER — TELEPHONE (OUTPATIENT)
Dept: NEUROSURGERY | Facility: CLINIC | Age: 67
End: 2022-07-01

## 2022-07-01 NOTE — TELEPHONE ENCOUNTER
RECEIVED CALL FROM CAT WITH SURGERY SCHEDULING THAT PATIENT HAD MISSED APPT ON 6/30/22 @ 12PM FOR PREWORK TESTING.     CALLED PT WIFE AND HAD TO LEAVE VOICE MESSAGE WITH INFORMATION ABOUT THIS MISSED APPT.     ADVISED THAT LAB WORK FROM APPT NEEDS TO BE SENT TO DR SIMPSON FOR PT CLEARANCE APPT ON 7/5/22.    ADVISED TO CALL [phone] TO R/S PREWORK OR SHE COULD CALL ME [phone] TO BE CONNECTED TO SURGERY SCHEDULING.     ALSO ADVISED TO KEEP APPT ON 7/5 WITH DR SIMPSON FOR CLEARANCE.     ADVISED IMPORTANCE OF RESCHEDULING PREWORK AS SURGERY CANNOT BE DONE WITHOUT IT.

## 2022-07-13 ENCOUNTER — PRE-ADMISSION TESTING (OUTPATIENT)
Dept: PREADMISSION TESTING | Facility: HOSPITAL | Age: 67
End: 2022-07-13

## 2022-07-13 VITALS
WEIGHT: 315 LBS | BODY MASS INDEX: 45.1 KG/M2 | HEART RATE: 77 BPM | DIASTOLIC BLOOD PRESSURE: 89 MMHG | RESPIRATION RATE: 18 BRPM | SYSTOLIC BLOOD PRESSURE: 200 MMHG | HEIGHT: 70 IN | OXYGEN SATURATION: 99 %

## 2022-07-13 DIAGNOSIS — M54.12 CERVICAL RADICULOPATHY: ICD-10-CM

## 2022-07-13 LAB
ALBUMIN SERPL-MCNC: 4.2 G/DL (ref 3.5–5.2)
ALBUMIN/GLOB SERPL: 1.3 G/DL
ALP SERPL-CCNC: 93 U/L (ref 39–117)
ALT SERPL W P-5'-P-CCNC: 12 U/L (ref 1–41)
ANION GAP SERPL CALCULATED.3IONS-SCNC: 11 MMOL/L (ref 5–15)
APTT PPP: 25.1 SECONDS (ref 24.1–35)
AST SERPL-CCNC: 15 U/L (ref 1–40)
BACTERIA UR QL AUTO: ABNORMAL /HPF
BASOPHILS # BLD AUTO: 0.05 10*3/MM3 (ref 0–0.2)
BASOPHILS NFR BLD AUTO: 0.7 % (ref 0–1.5)
BILIRUB SERPL-MCNC: 0.3 MG/DL (ref 0–1.2)
BILIRUB UR QL STRIP: NEGATIVE
BUN SERPL-MCNC: 36 MG/DL (ref 8–23)
BUN/CREAT SERPL: 18.4 (ref 7–25)
CALCIUM SPEC-SCNC: 9.6 MG/DL (ref 8.6–10.5)
CHLORIDE SERPL-SCNC: 101 MMOL/L (ref 98–107)
CLARITY UR: CLEAR
CO2 SERPL-SCNC: 28 MMOL/L (ref 22–29)
COLOR UR: YELLOW
CREAT SERPL-MCNC: 1.96 MG/DL (ref 0.76–1.27)
DEPRECATED RDW RBC AUTO: 42.8 FL (ref 37–54)
EGFRCR SERPLBLD CKD-EPI 2021: 37 ML/MIN/1.73
EOSINOPHIL # BLD AUTO: 0.37 10*3/MM3 (ref 0–0.4)
EOSINOPHIL NFR BLD AUTO: 5.1 % (ref 0.3–6.2)
ERYTHROCYTE [DISTWIDTH] IN BLOOD BY AUTOMATED COUNT: 13.4 % (ref 12.3–15.4)
GLOBULIN UR ELPH-MCNC: 3.2 GM/DL
GLUCOSE SERPL-MCNC: 101 MG/DL (ref 65–99)
GLUCOSE UR STRIP-MCNC: NEGATIVE MG/DL
HCT VFR BLD AUTO: 40.5 % (ref 37.5–51)
HGB BLD-MCNC: 13 G/DL (ref 13–17.7)
HGB UR QL STRIP.AUTO: ABNORMAL
HYALINE CASTS UR QL AUTO: ABNORMAL /LPF
IMM GRANULOCYTES # BLD AUTO: 0.05 10*3/MM3 (ref 0–0.05)
IMM GRANULOCYTES NFR BLD AUTO: 0.7 % (ref 0–0.5)
INR PPP: 0.96 (ref 0.91–1.09)
KETONES UR QL STRIP: NEGATIVE
LEUKOCYTE ESTERASE UR QL STRIP.AUTO: NEGATIVE
LYMPHOCYTES # BLD AUTO: 1.54 10*3/MM3 (ref 0.7–3.1)
LYMPHOCYTES NFR BLD AUTO: 21.2 % (ref 19.6–45.3)
MCH RBC QN AUTO: 28 PG (ref 26.6–33)
MCHC RBC AUTO-ENTMCNC: 32.1 G/DL (ref 31.5–35.7)
MCV RBC AUTO: 87.3 FL (ref 79–97)
MONOCYTES # BLD AUTO: 0.75 10*3/MM3 (ref 0.1–0.9)
MONOCYTES NFR BLD AUTO: 10.3 % (ref 5–12)
NEUTROPHILS NFR BLD AUTO: 4.52 10*3/MM3 (ref 1.7–7)
NEUTROPHILS NFR BLD AUTO: 62 % (ref 42.7–76)
NITRITE UR QL STRIP: NEGATIVE
NRBC BLD AUTO-RTO: 0 /100 WBC (ref 0–0.2)
PH UR STRIP.AUTO: <=5 [PH] (ref 5–8)
PLATELET # BLD AUTO: 256 10*3/MM3 (ref 140–450)
PMV BLD AUTO: 11.3 FL (ref 6–12)
POTASSIUM SERPL-SCNC: 3.7 MMOL/L (ref 3.5–5.2)
PROT SERPL-MCNC: 7.4 G/DL (ref 6–8.5)
PROT UR QL STRIP: ABNORMAL
PROTHROMBIN TIME: 12.4 SECONDS (ref 11.9–14.6)
RBC # BLD AUTO: 4.64 10*6/MM3 (ref 4.14–5.8)
RBC # UR STRIP: ABNORMAL /HPF
REF LAB TEST METHOD: ABNORMAL
SODIUM SERPL-SCNC: 140 MMOL/L (ref 136–145)
SP GR UR STRIP: 1.01 (ref 1–1.03)
SQUAMOUS #/AREA URNS HPF: ABNORMAL /HPF
UROBILINOGEN UR QL STRIP: ABNORMAL
WBC # UR STRIP: ABNORMAL /HPF
WBC NRBC COR # BLD: 7.28 10*3/MM3 (ref 3.4–10.8)

## 2022-07-13 PROCEDURE — 85610 PROTHROMBIN TIME: CPT

## 2022-07-13 PROCEDURE — 81001 URINALYSIS AUTO W/SCOPE: CPT

## 2022-07-13 PROCEDURE — 85025 COMPLETE CBC W/AUTO DIFF WBC: CPT

## 2022-07-13 PROCEDURE — 80053 COMPREHEN METABOLIC PANEL: CPT

## 2022-07-13 PROCEDURE — 85730 THROMBOPLASTIN TIME PARTIAL: CPT

## 2022-07-13 PROCEDURE — 36415 COLL VENOUS BLD VENIPUNCTURE: CPT

## 2022-07-13 NOTE — DISCHARGE INSTRUCTIONS
Before you come to the hospital        Arrival time: AS DIRECTED BY OFFICE     YOU MAY TAKE THE FOLLOWING MEDICATION(S) THE MORNING OF SURGERY WITH A SIP OF WATER: BUSPAR, COREG (CARVEDILOL), , NORCO  LYRICA           ALL OTHER HOME MEDICATION CHECK WITH YOUR PHYSICIAN (especially if you are taking diabetes medicines or blood thinners)    Do not take any Erectile Dysfunction medications (EX: CIALIS, VIAGRA) 24 hours prior to surgery.      If you were given and instructed to use a germ- killing soap, use as directed the night before surgery and the morning of surgery before coming to the hospital.             Eating and drinking restrictions prior to scheduled arrival time    2 Hours before arrival time STOP   Drinking Clear liquids (water, apple juice-no pulp)     6 Hours before arrival time STOP   Milk or drinks that contain milk, full liquids    6 Hours before arrival time STOP   Light meals or foods, such as toast or cereal    8 Hours before arrival time STOP   Heavy foods, such as meat, fried foods, or fatty foods    (It is extremely important that you follow these guidelines to prevent delay or cancelation of your procedure)     Clear Liquids  Water and flavored water                                                                      Clear Fruit juices, such as cranberry juice and apple juice.  Black coffee (NO cream of any kind, including powdered).  Plain tea  Clear bouillon or broth.  Flavored gelatin.  Soda.  Gatorade or Powerade.  Full liquid examples  Juices that have pulp.  Frozen ice pops that contain fruit pieces.  Coffee with creamer  Milk.  Yogurt.              MANAGING PAIN AFTER SURGERY    We know you are probably wondering what your pain will be like after surgery.  Following surgery it is unrealistic to expect you will not have pain.   Pain is how our bodies let us know that something is wrong or cautions us to be careful.  That said, our goal is to make your pain tolerable.    Methods we may  use to treat your pain include (oral or IV medications, PCAs, epidurals, nerve blocks, etc.)   While some procedures require IV pain medications for a short time after surgery, transitioning to pain medications by mouth allows for better management of pain.   Your nurse will encourage you to take oral pain medications whenever possible.  IV medications work almost immediately, but only last a short while.  Taking medications by mouth allows for a more constant level of medication in your blood stream for a longer period of time.      Once your pain is out of control it is harder to get back under control.  It is important you are aware when your next dose of pain medication is due.  If you are admitted, your nurse may write the time of your next dose on the white board in your room to help you remember.      We are interested in your pain and encourage you to inform us about aggravating factors during your visit.   Many times a simple repositioning every few hours can make a big difference.    If your physician says it is okay, do not let your pain prevent you from getting out of bed. Be sure to call your nurse for assistance prior to getting up so you do not fall.      Before surgery, please decide your tolerable pain goal.  These faces help describe the pain ratings we use on a 0-10 scale.   Be prepared to tell us your goal and whether or not you take pain or anxiety medications at home.

## 2022-07-15 ENCOUNTER — TELEPHONE (OUTPATIENT)
Dept: NEUROSURGERY | Facility: CLINIC | Age: 67
End: 2022-07-15

## 2022-07-15 ENCOUNTER — HOSPITAL ENCOUNTER (OUTPATIENT)
Dept: CARDIOLOGY | Facility: HOSPITAL | Age: 67
Discharge: HOME | End: 2022-07-15
Admitting: NURSE PRACTITIONER

## 2022-07-15 DIAGNOSIS — Z01.818 PREOP TESTING: Primary | ICD-10-CM

## 2022-07-15 DIAGNOSIS — Z01.818 PREOP TESTING: ICD-10-CM

## 2022-07-15 DIAGNOSIS — M54.12 CERVICAL RADICULOPATHY: Primary | ICD-10-CM

## 2022-07-15 PROCEDURE — 93005 ELECTROCARDIOGRAM TRACING: CPT | Performed by: NURSE PRACTITIONER

## 2022-07-15 PROCEDURE — 93010 ELECTROCARDIOGRAM REPORT: CPT | Performed by: INTERNAL MEDICINE

## 2022-07-15 NOTE — TELEPHONE ENCOUNTER
Contacted Joan and advised new order has been put in, she voiced understanding and stated pt will go to heart center to have testing completed.

## 2022-07-15 NOTE — TELEPHONE ENCOUNTER
Joan, the spouse of this pt called stating they were told at pre-work they did not need and EKG before surgery since he just had one done on 5/24/22. But Hermila Fuchs at his pcp's office reached out to her and stated they will not clear him without a new EKG    Joan stated she reached out to cardiologist office and they will not order one since it has been a while since he has been seen and advised joan to reach out to our office to order it.     Surgery is scheduled for 7/20/22.    Please review.

## 2022-07-18 ENCOUNTER — LAB (OUTPATIENT)
Dept: LAB | Facility: HOSPITAL | Age: 67
End: 2022-07-18

## 2022-07-18 DIAGNOSIS — M54.12 CERVICAL RADICULOPATHY: ICD-10-CM

## 2022-07-18 LAB
QT INTERVAL: 352 MS
QTC INTERVAL: 432 MS
SARS-COV-2 ORF1AB RESP QL NAA+PROBE: DETECTED

## 2022-07-18 PROCEDURE — C9803 HOPD COVID-19 SPEC COLLECT: HCPCS

## 2022-07-18 PROCEDURE — U0004 COV-19 TEST NON-CDC HGH THRU: HCPCS

## 2022-07-19 ENCOUNTER — TELEPHONE (OUTPATIENT)
Dept: NEUROSURGERY | Facility: CLINIC | Age: 67
End: 2022-07-19

## 2022-07-19 DIAGNOSIS — Z01.818 PREOP TESTING: Primary | ICD-10-CM

## 2022-07-19 NOTE — TELEPHONE ENCOUNTER
RECEIVED PT PRE SURGERY COVID RESULTS AND PATIENT IS POSITIVE (+) FOR COVID. PER DR WOOD PT WILL NEED TO BE CANCELLED FOR SURGERY ON 7/20 AND RESCHEDULED FOR AT LEAST 14 DAYS OUT.     CALLED AND ADVISED PT'S WIFE OF TEST RESULTS AND THAT PATIENT SHOULD QUARANTINE BASED ON THEIR PCPS RECOMMENDATIONS AND THAT I WILL CALL THEM AS SOON AS I KNOW A NEW SURGERY DATE AND TIME.     PT WIFE ZAINAB VOICED UNDERSTANDING.

## 2022-08-01 ENCOUNTER — OFFICE VISIT (OUTPATIENT)
Dept: CARDIOLOGY | Facility: CLINIC | Age: 67
End: 2022-08-01

## 2022-08-01 ENCOUNTER — TELEPHONE (OUTPATIENT)
Dept: NEUROSURGERY | Facility: CLINIC | Age: 67
End: 2022-08-01

## 2022-08-01 VITALS
WEIGHT: 315 LBS | BODY MASS INDEX: 45.1 KG/M2 | HEART RATE: 93 BPM | DIASTOLIC BLOOD PRESSURE: 80 MMHG | OXYGEN SATURATION: 98 % | SYSTOLIC BLOOD PRESSURE: 120 MMHG | HEIGHT: 70 IN

## 2022-08-01 DIAGNOSIS — Z01.818 PRE-OP EVALUATION: Primary | ICD-10-CM

## 2022-08-01 DIAGNOSIS — I10 PRIMARY HYPERTENSION: ICD-10-CM

## 2022-08-01 DIAGNOSIS — I25.10 CORONARY ARTERY DISEASE INVOLVING NATIVE CORONARY ARTERY OF NATIVE HEART WITHOUT ANGINA PECTORIS: ICD-10-CM

## 2022-08-01 PROCEDURE — 93000 ELECTROCARDIOGRAM COMPLETE: CPT | Performed by: INTERNAL MEDICINE

## 2022-08-01 PROCEDURE — 99214 OFFICE O/P EST MOD 30 MIN: CPT | Performed by: INTERNAL MEDICINE

## 2022-08-01 NOTE — PROGRESS NOTES
Subjective:     Encounter Date:08/01/2022      Patient ID: Erick Luong is a 67 y.o. male with coronary artery disease, status post previous coronary artery bypass grafting (left internal mammary artery graft to left anterior descending coronary artery; saphenous vein graft to diagonal; saphenous vein graft to obtuse marginal; saphenous vein graft to right coronary artery), chronic noncardiac chest pain, morbid obesity, type II diabetes mellitus, chronic diastolic dysfunction, hypertension, hyperlipidemia who presents today for preoperative risk assessment prior to noncardiac surgery.    Chief Complaint: Here today for preoperative evaluation prior to noncardiac surgery    HPI: This patient presents today for follow-up and for preoperative evaluation prior to noncardiac surgery.  The patient says that from a cardiac standpoint he is doing well.  He continues to have some intermittent episodes of chest discomfort but is known to have chronic noncardiac chest pain.  He describes no increase in symptoms whatsoever.  Occasionally, he will have some sharp left-sided chest pain that is fleeting, nonradiating from the chest, no associated signs or symptoms.  He describes mild chronic shortness of breath and dyspnea on exertion.  No increase in symptoms lately.  No significant increase in lower extremity edema.  He denies orthopnea, PND.  No palpitations, lightheadedness, dizziness, syncope.  He does have neck pain.  He was to undergo surgery on his neck but had this delayed.  Also, he had an ECG that he was told was abnormal therefore he was asked to present here before his surgery for further evaluation.  As stated, he says that he is feeling well from a cardiac standpoint and has no significant complaints at this time.  Medications have been well-tolerated.  He does remain on aspirin.  No significant bleeding issues.  Blood pressure is generally well controlled.    Coronary Artery Disease  Presents for follow-up  visit. Symptoms include chest pain, leg swelling and shortness of breath. Pertinent negatives include no dizziness or palpitations. The symptoms have been stable.       Current Outpatient Medications:   •  ascorbic acid (VITAMIN C) 1000 MG tablet, Take 1 tablet by mouth Daily., Disp: 30 tablet, Rfl: 0  •  aspirin 81 MG EC tablet, Take 81 mg by mouth Daily., Disp: , Rfl:   •  bumetanide (BUMEX) 1 MG tablet, Take 1 tablet by mouth 2 (Two) Times a Day., Disp: 60 tablet, Rfl: 6  •  busPIRone (BUSPAR) 10 MG tablet, Take 1 tablet 3 times a day by mouth as needed., Disp: 45 tablet, Rfl: 2  •  busPIRone (BUSPAR) 10 MG tablet, Take 1 tablet by mouth 3 (Three) Times a Day As Needed., Disp: 45 tablet, Rfl: 2  •  busPIRone (BUSPAR) 10 MG tablet, Take 1 tablet by mouth 3 (Three) Times a Day As Needed., Disp: 45 tablet, Rfl: 2  •  carvedilol (COREG) 6.25 MG tablet, Take 1 tablet by mouth 2 (Two) Times a Day., Disp: 180 tablet, Rfl: 4  •  Cholecalciferol 125 MCG (5000 UT) tablet, Take 1 tablet by mouth Daily with meal, Disp: 30 tablet, Rfl: 2  •  colchicine 0.6 MG tablet, Take 1 tablet by mouth 2 (two) times a day., Disp: 28 tablet, Rfl: 2  •  cyclobenzaprine (FLEXERIL) 5 MG tablet, Take 1 tablet by mouth 2 (Two) Times a Day As Needed for muscle spasms, Disp: 60 tablet, Rfl: 5  •  cyclobenzaprine (FLEXERIL) 5 MG tablet, Take 1 tablet by mouth once daily in the morning and 2 tablets at bedtime., Disp: 90 tablet, Rfl: 3  •  cyclobenzaprine (FLEXERIL) 5 MG tablet, Take 1 tablet by mouth every morning, then take 2 tablets by mouth at bedtime., Disp: 90 tablet, Rfl: 3  •  donepezil (ARICEPT) 10 MG tablet, Take 1 tablet by mouth Daily., Disp: 90 tablet, Rfl: 4  •  donepezil (ARICEPT) 10 MG tablet, Take 1 tablet by mouth Daily., Disp: 90 tablet, Rfl: 4  •  Dulaglutide (Trulicity) 1.5 MG/0.5ML solution pen-injector, Inject 1.5 mg under the skin into the appropriate area as directed 1 (One) Time Per Week., Disp: 6 mL, Rfl: 11  •   DULoxetine (CYMBALTA) 60 MG capsule, Take 60 mg by mouth Daily., Disp: , Rfl:   •  DULoxetine (CYMBALTA) 60 MG capsule, Take 1 capsule by mouth Daily., Disp: 90 capsule, Rfl: 4  •  Finerenone (Kerendia) 10 MG tablet, Take 10 mg by mouth Daily., Disp: 30 tablet, Rfl: 6  •  Finerenone 10 MG tablet, Take 1 tablet by mouth every day, Disp: 30 tablet, Rfl: 6  •  HYDROcodone-acetaminophen (NORCO) 7.5-325 MG per tablet, Take 1 tablet by mouth 2 (Two) Times a Day As Needed., Disp: 45 tablet, Rfl: 0  •  HYDROcodone-acetaminophen (NORCO) 7.5-325 MG per tablet, Take 1 tablet twice a day by oral route as needed., Disp: 45 tablet, Rfl: 0  •  HYDROcodone-acetaminophen (NORCO) 7.5-325 MG per tablet, Take 1 tablet by mouth 2 (Two) Times a Day As Needed-- fill on/after 7/7/22, Disp: 45 tablet, Rfl: 0  •  insulin aspart (novoLOG FLEXPEN) 100 UNIT/ML solution pen-injector sc pen, Inject up to 150 units subcutaneously twice daily according to sliding scale instructions from provider., Disp: 90 mL, Rfl: 1  •  Insulin Lispro, 1 Unit Dial, (HumaLOG KwikPen) 100 UNIT/ML solution pen-injector, Inject according to sliding scale:  glucose=151-200 use 2 UNITS; glucose=201-250 use 4 units; glucose=251-300 use 6 units; above 301 use 8 units (Patient taking differently: Inject according to sliding scale:  glucose=151-200 use 2 UNITS; glucose=201-250 use 4 units; glucose=251-300 use 6 units; above 301 use 8 units), Disp: 18 mL, Rfl: 11  •  Insulin Pen Needle 31G X 5 MM misc, Use three times a day., Disp: 100 each, Rfl: 11  •  Insulin Regular Human, Conc, (HumuLIN R) 500 UNIT/ML solution pen-injector CONCENTRATED injection, Inject 120 Units under the skin into the appropriate area as directed 2 (Two) Times a Day with breakfast and dinner., Disp: 18 mL, Rfl: 7  •  irbesartan (AVAPRO) 150 MG tablet, Take 1 tablet by mouth Daily., Disp: 90 tablet, Rfl: 1  •  pantoprazole (PROTONIX) 40 MG EC tablet, Take 1 tablet by mouth twice a day., Disp: 180  "tablet, Rfl: 4  •  prazosin (MINIPRESS) 2 MG capsule, Take 1 capsule by mouth every night at bedtime., Disp: 30 capsule, Rfl: 4  •  pregabalin (LYRICA) 50 MG capsule, Take 1 capsule by mouth 3 (Three) Times a Day., Disp: 90 capsule, Rfl: 2  •  pregabalin (LYRICA) 50 MG capsule, Take 1 capsule by mouth 3 (Three) Times a Day., Disp: 90 capsule, Rfl: 2  •  rosuvastatin (CRESTOR) 40 MG tablet, Take 1 tablet by mouth Every Night., Disp: 90 tablet, Rfl: 3  •  tamsulosin (FLOMAX) 0.4 MG capsule 24 hr capsule, Take 1 capsule by mouth Daily., Disp: , Rfl:   •  tamsulosin (FLOMAX) 0.4 MG capsule 24 hr capsule, Take 1 capsule by mouth Daily., Disp: 90 capsule, Rfl: 0  •  traZODone (DESYREL) 100 MG tablet, Take 1 tablet by mouth Every Night., Disp: 90 tablet, Rfl: 2  •  traZODone (DESYREL) 100 MG tablet, Take 1 tablet by mouth At Night As Needed., Disp: 90 tablet, Rfl: 4  •  vitamin D (ERGOCALCIFEROL) 1.25 MG (95448 UT) capsule capsule, Take 1 capsule by mouth 1 (One) Time Per Week., Disp: 4 capsule, Rfl: 6  •  Zinc Sulfate 220 (50 Zn) MG tablet, Take 1 tablet by mouth Daily., Disp: 30 each, Rfl: 0    Allergies   Allergen Reactions   • Cefepime Hives and Anaphylaxis   • Bactrim [Sulfamethoxazole-Trimethoprim] Other (See Comments)     \"RENAL FAILURE\"   • Clindamycin Itching   • Vancomycin Itching   • Zolpidem Unknown - High Severity   • Metronidazole Rash     Social History     Tobacco Use   • Smoking status: Former Smoker     Quit date:      Years since quittin.6   • Smokeless tobacco: Never Used   • Tobacco comment: smoked in [email or web address]   Substance Use Topics   • Alcohol use: No     Review of Systems   Constitutional: Positive for malaise/fatigue. Negative for fever and weight loss.   Cardiovascular: Positive for chest pain, dyspnea on exertion and leg swelling. Negative for orthopnea, palpitations, paroxysmal nocturnal dyspnea and syncope.   Respiratory: Positive for shortness of breath. Negative for cough and " "wheezing.    Hematologic/Lymphatic: Negative for adenopathy and bleeding problem.   Musculoskeletal: Positive for arthritis, joint pain and neck pain.   Gastrointestinal: Negative for abdominal pain, nausea and vomiting.   Neurological: Negative for dizziness, headaches and loss of balance.       ECG 12 Lead    Date/Time: 8/1/2022 10:13 AM  Performed by: Emeka Garay MD  Authorized by: Emeka Garay MD   Comparison: compared with previous ECG from 7/15/2022  Similar to previous ECG  Rhythm: sinus rhythm  Ectopy: atrial premature contractions  Rate: normal  BPM: 91  Conduction: 1st degree AV block  QRS axis: left  Other findings: left ventricular hypertrophy    Clinical impression: abnormal EKG             Objective:     Vitals reviewed.   Constitutional:       General: Not in acute distress.     Appearance: Not in distress. Morbidly obese.   Eyes:      Extraocular Movements: Extraocular movements intact.   HENT:      Head: Normocephalic and atraumatic.   Pulmonary:      Effort: Pulmonary effort is normal.      Breath sounds: Normal breath sounds. No wheezing. No rhonchi. No rales.   Cardiovascular:      Normal rate. Regular rhythm.      Murmurs: There is no murmur.      No gallop.   Pulses:     Intact distal pulses.   Edema:     Peripheral edema present.  Abdominal:      General: Abdomen is protuberant. Bowel sounds are normal. There is no distension.      Palpations: Abdomen is soft.      Tenderness: There is no abdominal tenderness.   Skin:     General: Skin is warm and dry. There is no cyanosis.      Findings: No erythema or rash.   Neurological:      Mental Status: Alert and oriented to person, place, and time.      Cranial Nerves: No cranial nerve deficit.       /80   Pulse 93   Ht 179 cm (70.47\")   Wt (!) 154 kg (340 lb)   SpO2 98%   BMI 48.14 kg/m²     Data/Lab Review:     I did review the prior ECG from 3/4/2022 also showing sinus rhythm with first-degree AV block, left axis " deviation with nonspecific intraventricular conduction delay.    Cardiac catheterization on 2/8/2021: Severe native multivessel coronary artery disease.  Patent LIMA and saphenous vein grafts noted.  No intervention required.    Echocardiogram on 2/7/2021: Normal left ventricular systolic function with trace aortic regurgitation, mild mitral valve regurgitation.      Assessment:          Diagnosis Plan   1. Pre-op evaluation  ECG 12 Lead   2. Coronary artery disease involving native coronary artery of native heart without angina pectoris     3. Primary hypertension            Plan:       1.  Preoperative evaluation: The patient is undergoing intermediate risk surgery.  He does have coronary artery disease, insulin requiring type 2 diabetes mellitus and is morbidly obese.  He does have chronic atypical chest pain however from a cardiovascular standpoint, he appears to be stable at this time.  He describes no new or unstable cardiac conditions.  Estimated functional capacity is somewhat difficult as the patient is not very active, however he notes no decline in his ability to do activities.  He notes no significant symptoms which prohibit him from performing his activities of daily living.  At this time, would not feel that he would benefit strongly from any further cardiac work-up.  He is ECG today is consistent with the 1 done in the preoperative setting and also consistent with the 2 previous ones.  Therefore, there were no acute findings on the ECG today that would prohibit him from having surgery.  Recommended continue aspirin, beta-blocker therapies.    2.  Coronary artery disease: Clinically stable at this time.  The patient has a longstanding history of intermittent atypical noncardiac chest pain.  He remains on aspirin, beta-blocker and statin therapies.  No changes recommended.    3.  Primary hypertension: Blood pressure is well controlled.  Continue current medications.    We will plan to see the patient  back in 12 months unless otherwise needed sooner.

## 2022-08-01 NOTE — TELEPHONE ENCOUNTER
Left a voicemail asking for someone to either fax me his clearance note from Hermila DAVIS or if they needed to speak to me they could call me direct.    GAVIOTA ESPINOZA [facility]  PHYSICIAN LEAD  DR GABRIELLE WOOD  [facility]UROS[facility]GERY

## 2022-08-02 NOTE — TELEPHONE ENCOUNTER
Received a call from Yuliana with Hermila Fuchs's office stating that he is cleared for surgery with Dr. Soliz. I advised I would take verbal clearance but we still need a clearance note faxed over, she voiced understanding.

## 2022-08-03 ENCOUNTER — LAB (OUTPATIENT)
Dept: LAB | Facility: HOSPITAL | Age: 67
End: 2022-08-03

## 2022-08-03 DIAGNOSIS — Z01.818 PREOP TESTING: ICD-10-CM

## 2022-08-03 LAB — SARS-COV-2 ORF1AB RESP QL NAA+PROBE: NOT DETECTED

## 2022-08-03 PROCEDURE — C9803 HOPD COVID-19 SPEC COLLECT: HCPCS

## 2022-08-03 PROCEDURE — U0004 COV-19 TEST NON-CDC HGH THRU: HCPCS

## 2022-08-04 ENCOUNTER — TELEPHONE (OUTPATIENT)
Dept: NEUROSURGERY | Facility: CLINIC | Age: 67
End: 2022-08-04

## 2022-08-04 NOTE — TELEPHONE ENCOUNTER
LEFT MESSAGE FOR ZAINAB PATIENTS WIFE TO CONFIRM ARRIVAL TIME FOR SURGERY TOMORROW 8/5/22. PT TO ARRIVE @ 8AM PER DR WOOD.

## 2022-08-05 ENCOUNTER — ANESTHESIA (OUTPATIENT)
Dept: PERIOP | Facility: HOSPITAL | Age: 67
End: 2022-08-05

## 2022-08-05 ENCOUNTER — APPOINTMENT (OUTPATIENT)
Dept: GENERAL RADIOLOGY | Facility: HOSPITAL | Age: 67
End: 2022-08-05

## 2022-08-05 ENCOUNTER — HOSPITAL ENCOUNTER (OUTPATIENT)
Facility: HOSPITAL | Age: 67
Discharge: HOME | End: 2022-08-06
Attending: NEUROLOGICAL SURGERY | Admitting: NEUROLOGICAL SURGERY

## 2022-08-05 ENCOUNTER — ANESTHESIA EVENT (OUTPATIENT)
Dept: PERIOP | Facility: HOSPITAL | Age: 67
End: 2022-08-05

## 2022-08-05 DIAGNOSIS — M54.12 CERVICAL RADICULOPATHY: ICD-10-CM

## 2022-08-05 DIAGNOSIS — Z78.9 DECREASED ACTIVITIES OF DAILY LIVING (ADL): ICD-10-CM

## 2022-08-05 DIAGNOSIS — G95.9 CERVICAL MYELOPATHY: Primary | ICD-10-CM

## 2022-08-05 DIAGNOSIS — M51.37 DEGENERATION OF LUMBAR OR LUMBOSACRAL INTERVERTEBRAL DISC: ICD-10-CM

## 2022-08-05 DIAGNOSIS — M50.30 DEGENERATION OF CERVICAL INTERVERTEBRAL DISC: ICD-10-CM

## 2022-08-05 LAB
ABO GROUP BLD: NORMAL
BLD GP AB SCN SERPL QL: NEGATIVE
GLUCOSE BLDC GLUCOMTR-MCNC: 140 MG/DL (ref 70–130)
GLUCOSE BLDC GLUCOMTR-MCNC: 157 MG/DL (ref 70–130)
GLUCOSE BLDC GLUCOMTR-MCNC: 239 MG/DL (ref 70–130)
RH BLD: NEGATIVE
T&S EXPIRATION DATE: NORMAL

## 2022-08-05 PROCEDURE — 72040 X-RAY EXAM NECK SPINE 2-3 VW: CPT

## 2022-08-05 PROCEDURE — 86900 BLOOD TYPING SEROLOGIC ABO: CPT | Performed by: NURSE PRACTITIONER

## 2022-08-05 PROCEDURE — C1713 ANCHOR/SCREW BN/BN,TIS/BN: HCPCS | Performed by: NEUROLOGICAL SURGERY

## 2022-08-05 PROCEDURE — 88311 DECALCIFY TISSUE: CPT | Performed by: NEUROLOGICAL SURGERY

## 2022-08-05 PROCEDURE — 76000 FLUOROSCOPY <1 HR PHYS/QHP: CPT

## 2022-08-05 PROCEDURE — 25010000002 MORPHINE SULFATE (PF) 2 MG/ML SOLUTION: Performed by: NURSE PRACTITIONER

## 2022-08-05 PROCEDURE — 82962 GLUCOSE BLOOD TEST: CPT

## 2022-08-05 PROCEDURE — 22853 INSJ BIOMECHANICAL DEVICE: CPT | Performed by: NEUROLOGICAL SURGERY

## 2022-08-05 PROCEDURE — 22845 INSERT SPINE FIXATION DEVICE: CPT | Performed by: NEUROLOGICAL SURGERY

## 2022-08-05 PROCEDURE — 25010000002 DAPTOMYCIN PER 1 MG: Performed by: NEUROLOGICAL SURGERY

## 2022-08-05 PROCEDURE — 86901 BLOOD TYPING SEROLOGIC RH(D): CPT | Performed by: NURSE PRACTITIONER

## 2022-08-05 PROCEDURE — S0260 H&P FOR SURGERY: HCPCS | Performed by: NEUROLOGICAL SURGERY

## 2022-08-05 PROCEDURE — 63710000001 INSULIN LISPRO (HUMAN) PER 5 UNITS: Performed by: NURSE PRACTITIONER

## 2022-08-05 PROCEDURE — 25010000002 ONDANSETRON PER 1 MG: Performed by: NURSE ANESTHETIST, CERTIFIED REGISTERED

## 2022-08-05 PROCEDURE — 22551 ARTHRD ANT NTRBDY CERVICAL: CPT | Performed by: NEUROLOGICAL SURGERY

## 2022-08-05 PROCEDURE — 97165 OT EVAL LOW COMPLEX 30 MIN: CPT

## 2022-08-05 PROCEDURE — 25010000002 PROPOFOL 10 MG/ML EMULSION: Performed by: NURSE ANESTHETIST, CERTIFIED REGISTERED

## 2022-08-05 PROCEDURE — 88304 TISSUE EXAM BY PATHOLOGIST: CPT | Performed by: NEUROLOGICAL SURGERY

## 2022-08-05 PROCEDURE — 86850 RBC ANTIBODY SCREEN: CPT | Performed by: NURSE PRACTITIONER

## 2022-08-05 PROCEDURE — 25010000002 EPINEPHRINE 1 MG/ML SOLUTION 1 ML AMPULE: Performed by: NEUROLOGICAL SURGERY

## 2022-08-05 DEVICE — SCREW PK2 G6623515 SA S-D 3.5MM X 15MM
Type: IMPLANTABLE DEVICE | Site: SPINE CERVICAL | Status: FUNCTIONAL
Brand: DIVERGENCE® ANTERIOR CERVICAL FUSION SYSTEM

## 2022-08-05 DEVICE — DBM T43103 2.5CC GRAFTON PUTTY
Type: IMPLANTABLE DEVICE | Site: SPINE CERVICAL | Status: FUNCTIONAL
Brand: GRAFTON®AND GRAFTON PLUS®DEMINERALIZED BONE MATRIX (DBM)

## 2022-08-05 DEVICE — KT HEMOST ABS SURGIFOAM PORCN 1GRAM: Type: IMPLANTABLE DEVICE | Site: SPINE CERVICAL | Status: FUNCTIONAL

## 2022-08-05 DEVICE — HEMOST ABS SURGIFOAM SZ100 8X12 10MM: Type: IMPLANTABLE DEVICE | Site: SPINE CERVICAL | Status: FUNCTIONAL

## 2022-08-05 RX ORDER — SODIUM CHLORIDE 0.9 % (FLUSH) 0.9 %
10 SYRINGE (ML) INJECTION AS NEEDED
Status: DISCONTINUED | OUTPATIENT
Start: 2022-08-05 | End: 2022-08-05 | Stop reason: HOSPADM

## 2022-08-05 RX ORDER — DEXTROSE MONOHYDRATE 25 G/50ML
12.5 INJECTION, SOLUTION INTRAVENOUS AS NEEDED
Status: DISCONTINUED | OUTPATIENT
Start: 2022-08-05 | End: 2022-08-05 | Stop reason: HOSPADM

## 2022-08-05 RX ORDER — ROCURONIUM BROMIDE 10 MG/ML
INJECTION, SOLUTION INTRAVENOUS AS NEEDED
Status: DISCONTINUED | OUTPATIENT
Start: 2022-08-05 | End: 2022-08-05 | Stop reason: SURG

## 2022-08-05 RX ORDER — BUMETANIDE 1 MG/1
1 TABLET ORAL
Status: DISCONTINUED | OUTPATIENT
Start: 2022-08-05 | End: 2022-08-06 | Stop reason: HOSPADM

## 2022-08-05 RX ORDER — TERAZOSIN 2 MG/1
2 CAPSULE ORAL NIGHTLY
Status: DISCONTINUED | OUTPATIENT
Start: 2022-08-05 | End: 2022-08-06 | Stop reason: HOSPADM

## 2022-08-05 RX ORDER — SODIUM CHLORIDE 0.9 % (FLUSH) 0.9 %
3 SYRINGE (ML) INJECTION AS NEEDED
Status: DISCONTINUED | OUTPATIENT
Start: 2022-08-05 | End: 2022-08-05 | Stop reason: HOSPADM

## 2022-08-05 RX ORDER — TAMSULOSIN HYDROCHLORIDE 0.4 MG/1
0.4 CAPSULE ORAL DAILY
Status: DISCONTINUED | OUTPATIENT
Start: 2022-08-05 | End: 2022-08-06 | Stop reason: HOSPADM

## 2022-08-05 RX ORDER — SUCCINYLCHOLINE/SOD CL,ISO/PF 200MG/10ML
SYRINGE (ML) INTRAVENOUS AS NEEDED
Status: DISCONTINUED | OUTPATIENT
Start: 2022-08-05 | End: 2022-08-05 | Stop reason: SURG

## 2022-08-05 RX ORDER — SODIUM CHLORIDE 9 MG/ML
INJECTION, SOLUTION INTRAVENOUS AS NEEDED
Status: DISCONTINUED | OUTPATIENT
Start: 2022-08-05 | End: 2022-08-05 | Stop reason: HOSPADM

## 2022-08-05 RX ORDER — LIDOCAINE HYDROCHLORIDE 20 MG/ML
INJECTION, SOLUTION EPIDURAL; INFILTRATION; INTRACAUDAL; PERINEURAL AS NEEDED
Status: DISCONTINUED | OUTPATIENT
Start: 2022-08-05 | End: 2022-08-05 | Stop reason: SURG

## 2022-08-05 RX ORDER — PANTOPRAZOLE SODIUM 40 MG/1
40 TABLET, DELAYED RELEASE ORAL
Status: DISCONTINUED | OUTPATIENT
Start: 2022-08-06 | End: 2022-08-06 | Stop reason: HOSPADM

## 2022-08-05 RX ORDER — SODIUM CHLORIDE 0.9 % (FLUSH) 0.9 %
10 SYRINGE (ML) INJECTION AS NEEDED
Status: DISCONTINUED | OUTPATIENT
Start: 2022-08-05 | End: 2022-08-06 | Stop reason: HOSPADM

## 2022-08-05 RX ORDER — LIDOCAINE HYDROCHLORIDE 10 MG/ML
0.5 INJECTION, SOLUTION EPIDURAL; INFILTRATION; INTRACAUDAL; PERINEURAL ONCE AS NEEDED
Status: DISCONTINUED | OUTPATIENT
Start: 2022-08-05 | End: 2022-08-05 | Stop reason: HOSPADM

## 2022-08-05 RX ORDER — MORPHINE SULFATE 2 MG/ML
1 INJECTION, SOLUTION INTRAMUSCULAR; INTRAVENOUS EVERY 4 HOURS PRN
Status: DISCONTINUED | OUTPATIENT
Start: 2022-08-05 | End: 2022-08-06 | Stop reason: HOSPADM

## 2022-08-05 RX ORDER — OXYCODONE AND ACETAMINOPHEN 10; 325 MG/1; MG/1
1 TABLET ORAL ONCE AS NEEDED
Status: COMPLETED | OUTPATIENT
Start: 2022-08-05 | End: 2022-08-05

## 2022-08-05 RX ORDER — CLINDAMYCIN PHOSPHATE 300 MG/50ML
300 INJECTION INTRAVENOUS EVERY 8 HOURS
Status: DISCONTINUED | OUTPATIENT
Start: 2022-08-05 | End: 2022-08-05

## 2022-08-05 RX ORDER — PROPOFOL 10 MG/ML
VIAL (ML) INTRAVENOUS AS NEEDED
Status: DISCONTINUED | OUTPATIENT
Start: 2022-08-05 | End: 2022-08-05 | Stop reason: SURG

## 2022-08-05 RX ORDER — ONDANSETRON 2 MG/ML
4 INJECTION INTRAMUSCULAR; INTRAVENOUS EVERY 6 HOURS PRN
Status: DISCONTINUED | OUTPATIENT
Start: 2022-08-05 | End: 2022-08-06 | Stop reason: HOSPADM

## 2022-08-05 RX ORDER — PHENYLEPHRINE HCL IN 0.9% NACL 1 MG/10 ML
SYRINGE (ML) INTRAVENOUS AS NEEDED
Status: DISCONTINUED | OUTPATIENT
Start: 2022-08-05 | End: 2022-08-05 | Stop reason: SURG

## 2022-08-05 RX ORDER — SODIUM CHLORIDE 0.9 % (FLUSH) 0.9 %
10 SYRINGE (ML) INJECTION EVERY 12 HOURS SCHEDULED
Status: DISCONTINUED | OUTPATIENT
Start: 2022-08-05 | End: 2022-08-05 | Stop reason: HOSPADM

## 2022-08-05 RX ORDER — ACETAMINOPHEN 325 MG/1
650 TABLET ORAL EVERY 4 HOURS PRN
Status: DISCONTINUED | OUTPATIENT
Start: 2022-08-05 | End: 2022-08-06 | Stop reason: HOSPADM

## 2022-08-05 RX ORDER — COLCHICINE 0.6 MG/1
0.6 TABLET ORAL 2 TIMES DAILY
Status: DISCONTINUED | OUTPATIENT
Start: 2022-08-05 | End: 2022-08-06 | Stop reason: HOSPADM

## 2022-08-05 RX ORDER — DROPERIDOL 2.5 MG/ML
0.62 INJECTION, SOLUTION INTRAMUSCULAR; INTRAVENOUS ONCE AS NEEDED
Status: DISCONTINUED | OUTPATIENT
Start: 2022-08-05 | End: 2022-08-05 | Stop reason: HOSPADM

## 2022-08-05 RX ORDER — SODIUM CHLORIDE, SODIUM LACTATE, POTASSIUM CHLORIDE, CALCIUM CHLORIDE 600; 310; 30; 20 MG/100ML; MG/100ML; MG/100ML; MG/100ML
9 INJECTION, SOLUTION INTRAVENOUS CONTINUOUS
Status: DISCONTINUED | OUTPATIENT
Start: 2022-08-05 | End: 2022-08-05

## 2022-08-05 RX ORDER — IBUPROFEN 600 MG/1
600 TABLET ORAL ONCE AS NEEDED
Status: DISCONTINUED | OUTPATIENT
Start: 2022-08-05 | End: 2022-08-05 | Stop reason: HOSPADM

## 2022-08-05 RX ORDER — INSULIN LISPRO 100 [IU]/ML
4-24 INJECTION, SOLUTION INTRAVENOUS; SUBCUTANEOUS
Status: DISCONTINUED | OUTPATIENT
Start: 2022-08-05 | End: 2022-08-06 | Stop reason: HOSPADM

## 2022-08-05 RX ORDER — LOSARTAN POTASSIUM 50 MG/1
50 TABLET ORAL
Status: DISCONTINUED | OUTPATIENT
Start: 2022-08-06 | End: 2022-08-06 | Stop reason: HOSPADM

## 2022-08-05 RX ORDER — BUSPIRONE HYDROCHLORIDE 10 MG/1
10 TABLET ORAL 3 TIMES DAILY PRN
Status: DISCONTINUED | OUTPATIENT
Start: 2022-08-05 | End: 2022-08-06 | Stop reason: HOSPADM

## 2022-08-05 RX ORDER — DEXTROSE MONOHYDRATE 25 G/50ML
25 INJECTION, SOLUTION INTRAVENOUS
Status: DISCONTINUED | OUTPATIENT
Start: 2022-08-05 | End: 2022-08-06 | Stop reason: HOSPADM

## 2022-08-05 RX ORDER — FLUMAZENIL 0.1 MG/ML
0.2 INJECTION INTRAVENOUS AS NEEDED
Status: DISCONTINUED | OUTPATIENT
Start: 2022-08-05 | End: 2022-08-05 | Stop reason: HOSPADM

## 2022-08-05 RX ORDER — CARVEDILOL 6.25 MG/1
6.25 TABLET ORAL 2 TIMES DAILY WITH MEALS
Status: DISCONTINUED | OUTPATIENT
Start: 2022-08-05 | End: 2022-08-06 | Stop reason: HOSPADM

## 2022-08-05 RX ORDER — SUFENTANIL CITRATE 50 UG/ML
INJECTION EPIDURAL; INTRAVENOUS AS NEEDED
Status: DISCONTINUED | OUTPATIENT
Start: 2022-08-05 | End: 2022-08-05 | Stop reason: SURG

## 2022-08-05 RX ORDER — BISACODYL 10 MG
10 SUPPOSITORY, RECTAL RECTAL DAILY PRN
Status: DISCONTINUED | OUTPATIENT
Start: 2022-08-05 | End: 2022-08-06 | Stop reason: HOSPADM

## 2022-08-05 RX ORDER — DONEPEZIL HYDROCHLORIDE 10 MG/1
10 TABLET, FILM COATED ORAL DAILY
Status: DISCONTINUED | OUTPATIENT
Start: 2022-08-05 | End: 2022-08-06 | Stop reason: HOSPADM

## 2022-08-05 RX ORDER — OXYCODONE AND ACETAMINOPHEN 7.5; 325 MG/1; MG/1
1 TABLET ORAL EVERY 4 HOURS PRN
Status: DISCONTINUED | OUTPATIENT
Start: 2022-08-05 | End: 2022-08-06 | Stop reason: HOSPADM

## 2022-08-05 RX ORDER — DOCUSATE SODIUM 100 MG/1
100 CAPSULE, LIQUID FILLED ORAL 2 TIMES DAILY
Status: DISCONTINUED | OUTPATIENT
Start: 2022-08-05 | End: 2022-08-06 | Stop reason: HOSPADM

## 2022-08-05 RX ORDER — CLINDAMYCIN PHOSPHATE 900 MG/50ML
INJECTION INTRAVENOUS AS NEEDED
Status: DISCONTINUED | OUTPATIENT
Start: 2022-08-05 | End: 2022-08-05 | Stop reason: SURG

## 2022-08-05 RX ORDER — FENTANYL CITRATE 50 UG/ML
25 INJECTION, SOLUTION INTRAMUSCULAR; INTRAVENOUS
Status: DISCONTINUED | OUTPATIENT
Start: 2022-08-05 | End: 2022-08-05 | Stop reason: HOSPADM

## 2022-08-05 RX ORDER — CYCLOBENZAPRINE HCL 10 MG
10 TABLET ORAL 3 TIMES DAILY PRN
Status: DISCONTINUED | OUTPATIENT
Start: 2022-08-05 | End: 2022-08-06 | Stop reason: HOSPADM

## 2022-08-05 RX ORDER — PREGABALIN 50 MG/1
50 CAPSULE ORAL 3 TIMES DAILY
Status: DISCONTINUED | OUTPATIENT
Start: 2022-08-05 | End: 2022-08-06 | Stop reason: HOSPADM

## 2022-08-05 RX ORDER — NICOTINE POLACRILEX 4 MG
15 LOZENGE BUCCAL
Status: DISCONTINUED | OUTPATIENT
Start: 2022-08-05 | End: 2022-08-06 | Stop reason: HOSPADM

## 2022-08-05 RX ORDER — ONDANSETRON 2 MG/ML
4 INJECTION INTRAMUSCULAR; INTRAVENOUS
Status: DISCONTINUED | OUTPATIENT
Start: 2022-08-05 | End: 2022-08-05 | Stop reason: HOSPADM

## 2022-08-05 RX ORDER — MAGNESIUM HYDROXIDE 1200 MG/15ML
LIQUID ORAL AS NEEDED
Status: DISCONTINUED | OUTPATIENT
Start: 2022-08-05 | End: 2022-08-05 | Stop reason: HOSPADM

## 2022-08-05 RX ORDER — NALOXONE HCL 0.4 MG/ML
0.4 VIAL (ML) INJECTION
Status: DISCONTINUED | OUTPATIENT
Start: 2022-08-05 | End: 2022-08-06 | Stop reason: HOSPADM

## 2022-08-05 RX ORDER — POLYETHYLENE GLYCOL 3350 17 G/17G
17 POWDER, FOR SOLUTION ORAL DAILY PRN
Status: DISCONTINUED | OUTPATIENT
Start: 2022-08-05 | End: 2022-08-06 | Stop reason: HOSPADM

## 2022-08-05 RX ORDER — ROSUVASTATIN CALCIUM 20 MG/1
40 TABLET, COATED ORAL NIGHTLY
Status: DISCONTINUED | OUTPATIENT
Start: 2022-08-05 | End: 2022-08-06 | Stop reason: HOSPADM

## 2022-08-05 RX ORDER — SODIUM CHLORIDE 0.9 % (FLUSH) 0.9 %
3 SYRINGE (ML) INJECTION EVERY 12 HOURS SCHEDULED
Status: DISCONTINUED | OUTPATIENT
Start: 2022-08-05 | End: 2022-08-06 | Stop reason: HOSPADM

## 2022-08-05 RX ORDER — DULOXETIN HYDROCHLORIDE 30 MG/1
60 CAPSULE, DELAYED RELEASE ORAL DAILY
Status: DISCONTINUED | OUTPATIENT
Start: 2022-08-05 | End: 2022-08-06 | Stop reason: HOSPADM

## 2022-08-05 RX ORDER — SODIUM CHLORIDE, SODIUM LACTATE, POTASSIUM CHLORIDE, CALCIUM CHLORIDE 600; 310; 30; 20 MG/100ML; MG/100ML; MG/100ML; MG/100ML
1000 INJECTION, SOLUTION INTRAVENOUS CONTINUOUS
Status: DISCONTINUED | OUTPATIENT
Start: 2022-08-05 | End: 2022-08-05

## 2022-08-05 RX ORDER — LABETALOL HYDROCHLORIDE 5 MG/ML
5 INJECTION, SOLUTION INTRAVENOUS
Status: DISCONTINUED | OUTPATIENT
Start: 2022-08-05 | End: 2022-08-05 | Stop reason: HOSPADM

## 2022-08-05 RX ORDER — NALOXONE HCL 0.4 MG/ML
0.04 VIAL (ML) INJECTION AS NEEDED
Status: DISCONTINUED | OUTPATIENT
Start: 2022-08-05 | End: 2022-08-05 | Stop reason: HOSPADM

## 2022-08-05 RX ORDER — SODIUM CHLORIDE 9 MG/ML
75 INJECTION, SOLUTION INTRAVENOUS CONTINUOUS
Status: DISCONTINUED | OUTPATIENT
Start: 2022-08-05 | End: 2022-08-06 | Stop reason: HOSPADM

## 2022-08-05 RX ORDER — TRAZODONE HYDROCHLORIDE 100 MG/1
100 TABLET ORAL NIGHTLY
Status: DISCONTINUED | OUTPATIENT
Start: 2022-08-05 | End: 2022-08-06 | Stop reason: HOSPADM

## 2022-08-05 RX ORDER — ONDANSETRON 2 MG/ML
INJECTION INTRAMUSCULAR; INTRAVENOUS AS NEEDED
Status: DISCONTINUED | OUTPATIENT
Start: 2022-08-05 | End: 2022-08-05 | Stop reason: SURG

## 2022-08-05 RX ORDER — KETAMINE HCL IN NACL, ISO-OSM 100MG/10ML
SYRINGE (ML) INJECTION AS NEEDED
Status: DISCONTINUED | OUTPATIENT
Start: 2022-08-05 | End: 2022-08-05 | Stop reason: SURG

## 2022-08-05 RX ORDER — HYDROMORPHONE HYDROCHLORIDE 1 MG/ML
0.5 INJECTION, SOLUTION INTRAMUSCULAR; INTRAVENOUS; SUBCUTANEOUS
Status: DISCONTINUED | OUTPATIENT
Start: 2022-08-05 | End: 2022-08-05 | Stop reason: HOSPADM

## 2022-08-05 RX ORDER — EPHEDRINE SULFATE 50 MG/ML
INJECTION, SOLUTION INTRAVENOUS AS NEEDED
Status: DISCONTINUED | OUTPATIENT
Start: 2022-08-05 | End: 2022-08-05 | Stop reason: SURG

## 2022-08-05 RX ADMIN — CLINDAMYCIN IN 5 PERCENT DEXTROSE 900 MG: 18 INJECTION, SOLUTION INTRAVENOUS at 10:50

## 2022-08-05 RX ADMIN — OXYCODONE HYDROCHLORIDE AND ACETAMINOPHEN 1 TABLET: 7.5; 325 TABLET ORAL at 22:29

## 2022-08-05 RX ADMIN — SODIUM CHLORIDE 75 ML/HR: 9 INJECTION, SOLUTION INTRAVENOUS at 14:17

## 2022-08-05 RX ADMIN — SUFENTANIL CITRATE 50 MCG: 50 INJECTION, SOLUTION EPIDURAL; INTRAVENOUS at 11:19

## 2022-08-05 RX ADMIN — SUFENTANIL CITRATE 20 MCG: 50 INJECTION, SOLUTION EPIDURAL; INTRAVENOUS at 10:44

## 2022-08-05 RX ADMIN — EPHEDRINE SULFATE 10 MG: 50 INJECTION INTRAVENOUS at 11:55

## 2022-08-05 RX ADMIN — Medication 20 MG: at 11:45

## 2022-08-05 RX ADMIN — DONEPEZIL HYDROCHLORIDE 10 MG: 10 TABLET, FILM COATED ORAL at 14:32

## 2022-08-05 RX ADMIN — EPHEDRINE SULFATE 10 MG: 50 INJECTION INTRAVENOUS at 12:16

## 2022-08-05 RX ADMIN — MORPHINE SULFATE 1 MG: 2 INJECTION, SOLUTION INTRAMUSCULAR; INTRAVENOUS at 20:59

## 2022-08-05 RX ADMIN — OXYCODONE HYDROCHLORIDE AND ACETAMINOPHEN 1 TABLET: 7.5; 325 TABLET ORAL at 18:54

## 2022-08-05 RX ADMIN — BUMETANIDE 1 MG: 1 TABLET ORAL at 17:04

## 2022-08-05 RX ADMIN — PROPOFOL 150 MCG/KG/MIN: 10 INJECTION, EMULSION INTRAVENOUS at 10:44

## 2022-08-05 RX ADMIN — DULOXETINE HYDROCHLORIDE 60 MG: 30 CAPSULE, DELAYED RELEASE ORAL at 14:32

## 2022-08-05 RX ADMIN — PROPOFOL 70 MG: 10 INJECTION, EMULSION INTRAVENOUS at 10:56

## 2022-08-05 RX ADMIN — TERAZOSIN HYDROCHLORIDE 2 MG: 2 CAPSULE ORAL at 21:30

## 2022-08-05 RX ADMIN — Medication 100 MG: at 10:44

## 2022-08-05 RX ADMIN — DOCUSATE SODIUM 100 MG: 100 CAPSULE, LIQUID FILLED ORAL at 20:59

## 2022-08-05 RX ADMIN — TRAZODONE HYDROCHLORIDE 100 MG: 100 TABLET ORAL at 20:59

## 2022-08-05 RX ADMIN — Medication 200 MG: at 10:44

## 2022-08-05 RX ADMIN — PROPOFOL 50 MG: 10 INJECTION, EMULSION INTRAVENOUS at 11:19

## 2022-08-05 RX ADMIN — ROSUVASTATIN CALCIUM 40 MG: 20 TABLET, FILM COATED ORAL at 20:59

## 2022-08-05 RX ADMIN — EPHEDRINE SULFATE 10 MG: 50 INJECTION INTRAVENOUS at 11:58

## 2022-08-05 RX ADMIN — Medication 100 MCG: at 12:08

## 2022-08-05 RX ADMIN — Medication 100 MG: at 12:31

## 2022-08-05 RX ADMIN — Medication 30 MG: at 11:00

## 2022-08-05 RX ADMIN — INSULIN LISPRO 8 UNITS: 100 INJECTION, SOLUTION INTRAVENOUS; SUBCUTANEOUS at 17:14

## 2022-08-05 RX ADMIN — Medication 200 MCG: at 12:16

## 2022-08-05 RX ADMIN — PREGABALIN 50 MG: 50 CAPSULE ORAL at 15:40

## 2022-08-05 RX ADMIN — CYCLOBENZAPRINE 10 MG: 10 TABLET, FILM COATED ORAL at 14:31

## 2022-08-05 RX ADMIN — Medication 300 MCG: at 11:51

## 2022-08-05 RX ADMIN — Medication 3 ML: at 21:02

## 2022-08-05 RX ADMIN — Medication 100 MCG: at 11:55

## 2022-08-05 RX ADMIN — SUFENTANIL CITRATE 30 MCG: 50 INJECTION, SOLUTION EPIDURAL; INTRAVENOUS at 10:55

## 2022-08-05 RX ADMIN — OXYCODONE AND ACETAMINOPHEN 1 TABLET: 325; 10 TABLET ORAL at 13:17

## 2022-08-05 RX ADMIN — SODIUM CHLORIDE, POTASSIUM CHLORIDE, SODIUM LACTATE AND CALCIUM CHLORIDE 1000 ML: 600; 310; 30; 20 INJECTION, SOLUTION INTRAVENOUS at 10:36

## 2022-08-05 RX ADMIN — PREGABALIN 50 MG: 50 CAPSULE ORAL at 21:01

## 2022-08-05 RX ADMIN — MORPHINE SULFATE 1 MG: 2 INJECTION, SOLUTION INTRAMUSCULAR; INTRAVENOUS at 15:40

## 2022-08-05 RX ADMIN — CARVEDILOL 6.25 MG: 6.25 TABLET, FILM COATED ORAL at 17:04

## 2022-08-05 RX ADMIN — DAPTOMYCIN 450 MG: 500 INJECTION, POWDER, LYOPHILIZED, FOR SOLUTION INTRAVENOUS at 17:14

## 2022-08-05 RX ADMIN — PROPOFOL 175 MCG/KG/MIN: 10 INJECTION, EMULSION INTRAVENOUS at 12:03

## 2022-08-05 RX ADMIN — ROCURONIUM BROMIDE 10 MG: 10 SOLUTION INTRAVENOUS at 10:44

## 2022-08-05 RX ADMIN — COLCHICINE 0.6 MG: 0.6 TABLET, FILM COATED ORAL at 20:59

## 2022-08-05 RX ADMIN — Medication 100 MCG: at 10:44

## 2022-08-05 RX ADMIN — PROPOFOL 200 MG: 10 INJECTION, EMULSION INTRAVENOUS at 10:44

## 2022-08-05 RX ADMIN — TAMSULOSIN HYDROCHLORIDE 0.4 MG: 0.4 CAPSULE ORAL at 14:32

## 2022-08-05 RX ADMIN — Medication 3 ML: at 14:39

## 2022-08-05 RX ADMIN — EPHEDRINE SULFATE 5 MG: 50 INJECTION INTRAVENOUS at 12:08

## 2022-08-05 RX ADMIN — ONDANSETRON 4 MG: 2 INJECTION INTRAMUSCULAR; INTRAVENOUS at 12:20

## 2022-08-05 RX ADMIN — Medication 100 MCG: at 11:58

## 2022-08-05 RX ADMIN — Medication 100 MCG: at 11:43

## 2022-08-05 RX ADMIN — SODIUM CHLORIDE, POTASSIUM CHLORIDE, SODIUM LACTATE AND CALCIUM CHLORIDE: 600; 310; 30; 20 INJECTION, SOLUTION INTRAVENOUS at 10:43

## 2022-08-05 NOTE — OP NOTE
Procedure Note  Preop Diagnosis: Cervical radiculopathy [M54.12]    Post-Op Diagnosis Codes:     * Cervical radiculopathy [M54.12]     Procedure Name:C5/6 anterior discectomy  C5/6 anterior interbody fusion with allograft  C5/6 partial corpectomies less than 50%  C5/6 anterior instrumented fusion  Using the operative microscope          Indications:  A MRI revealed findings of Cervical Spondylosis WITH myelopathy . The patient now presents for anterior cervical discectomy and fusion of C5/6 after discussing therapeutic alternatives.          Surgeon: Karel Soliz MD     Assistants: none    Anesthesia: General endotracheal anesthesia    ASA Class: 3    Procedure Details   After obtaining informed consent, having the risks and benefits of the procedure explained including but not limited to infection, bleeding, paralysis, spinal fluid leak, speech and swallow difficulty, stroke, coma, and death, the patient was brought to the operating room.  The patient was given general anesthesia via an endotracheal tube.  The patient was laid supine on the operating table.  The patient was placed in an occipital mandibular head harness and 5 pounds of axial traction.  A preplanned incision in the right anterior portion of the neck was marked with indelible marker.  Fluoroscopy confirmed the correct level of C5/6.  The patient was then prepped and draped in a standard sterile fashion.  The preplanned incision was infiltrated with Marcaine and epinephrine.  A 10 blade scalpel was used to make an incision to the dermis and epidermis.  Bovie cautery was used to extend the incision down to the subcutaneous and soft tissues to the level of the platysma.  The platysma was then split longitudinally using Metzenbaum scissors.  The connective tissue plane lateral to the esophagus and trachea medial to the carotid artery was then developed using blunt and sharp dissection.  Once the anterior portion of the vertebral bodies were  identified a bent spinal needle was placed into the disc space at C5/6.  Fluoroscopy confirmed this was the correct level.  The longus colli were then dissected off the anterior lateral portions of the vertebral bodies using Bovie cautery.  The Shadow-Line retractor system was then placed into the wound.  At this point the operative microscope was brought in.  Discectomies at each level were then conducted using a 15 blade scalpel to incise the annulus then a series of up-biting curettes, pituitary rongeurs and a 5 mm barrel bit were used.  Once the discectomy was completed the posterior longitudinal ligament at each level was identified. It was bluntly dissected using a nerve hook.  The PLL was then incised using a 1 mm Kerrison.  Bilateral foraminotomies at each level were then conducted using a 1.5 mm Kerrison.  Partial corpectomies of the posterior portion of the vertebral bodies of C5/6 were then done using a 2 mm Kerrison.  Once this was completed a 6mm peek interbody spacer was tapped into place at each level.  The spacers were filled with allograft.  A series of 15 mm self drilling self-tapping screws were placed into the vertebral bodies.  Final AP and lateral fluoroscopy showed good position of all interbody devices and hardware.  The wound was then copiously irrigated with antibiotic solution.  The wound was inspected for hemostasis.  The subcutaneous tissues were then closed using a series of inverted interrupted 2-0 Vicryl sutures.  The skin was closed using running 4-0 Monocryl subcuticular.  All sponge, needle and instrument counts were correct at the end of the procedure.  The patient was extubated in stable condition and returned to recovery room with about 70 mL of blood loss.        Findings:  Cervical Spondylosis WITH myelopathy    Estimated Blood Loss:  70           Drains: none           Total IV Fluids: ml           Specimens:            Implants:   Implant Name Type Inv. Item Serial No.   Lot No. LRB No. Used Action   HEMOST ABS SURGIFOAM  8X12 10MM - [id number] Implant HEMOST ABS SURGIFOAM  8X12 10MM  [employer]  DIV [employer] J [employer] J [id number] N/A 1 Implanted   KT HEMOST ABS SURGIFOAM PORCN 1GRAM - [id number] Implant KT HEMOST ABS SURGIFOAM PORCN 1GRAM  [employer]  DIV [employer] J AND J [id number] N/A 1 Implanted   PUTTY DBM JG 2.5CC - [id number] Implant PUTTY DBM JG 2.5CC  [employer] [id number] N/A 1 Implanted   CAGE CERV DIVERGENCE STANDALONE LRD PEEK [id number] - [id number] Implant CAGE CERV DIVERGENCE STANDALONE LRD PEEK [id number]  [employer] [id number] N/A 1 Implanted   SCRW SD SA 3.5X15MM PK/2 - [id number] Implant SCRW SD SA 3.5X15MM PK/2  [employer] [id number] N/A 1 Implanted              Complications:  none           Disposition: PACU - hemodynamically stable.           Condition: stable        Karel Soliz MD

## 2022-08-05 NOTE — THERAPY EVALUATION
Acute Care - Occupational Therapy Initial Evaluation  [facility]     Patient Name: Erick Luong  : 1955  MRN: [id number]  Today's Date: 2022  Onset of Illness/Injury or Date of Surgery: 22  Date of Referral to OT: 22  Referring Physician: Dr. Soliz    Admit Date: 2022       ICD-10-CM ICD-9-CM   1. Cervical radiculopathy  M54.12 723.4   2. Decreased activities of daily living (ADL)  Z78.9 V49.89     Patient Active Problem List   Diagnosis   • Obesity, unspecified obesity severity, unspecified obesity type   • Essential hypertension   • Type 2 diabetes mellitus with hyperglycemia, with long-term current use of insulin ([facility])   • Nonsmoker   • Anemia due to chronic kidney disease   • Class 3 severe obesity due to excess calories with body mass index (BMI) of 45.0 to 49.9 in adult ([facility])   • Anasarca   • Sleep apnea with use of continuous positive airway pressure (CPAP)   • CKD (chronic kidney disease) stage 4, GFR 15-29 ml/min ([facility])   • Medically noncompliant   • Diabetic foot ulcer ([facility])   • Diabetic polyneuropathy associated with type 2 diabetes mellitus ([facility])   • Spinal stenosis in cervical region   • Degeneration of cervical intervertebral disc   • Cervical radiculopathy   • Degeneration of lumbar or lumbosacral intervertebral disc   • Cervical myelopathy (HCC)     Past Medical History:   Diagnosis Date   • Arthritis    • Autonomic disease    • CAD (coronary artery disease) 2017   • Cervical radiculopathy 2021   • Chronic constipation with acute exaccerbation 05/10/2021   • Coronary artery disease    • Degeneration of cervical intervertebral disc 2021   • Diabetes mellitus (HCC)    • Diabetic foot ulcer (HCC) 2020   • Diabetic polyneuropathy associated with type 2 diabetes mellitus (HCC) 2021   • Elevated cholesterol    • Gastroesophageal reflux disease 2019   • HTN (hypertension), benign 2017   • Hyperlipidemia    • Hypertension    • Mixed  hyperlipidemia 02/07/2017   • Multiple lung nodules 01/26/2020    5mm, 9 mm RLL identified 1/2020, not present 10/2019.   • Myocardial infarction (HCC)    • Osteomyelitis (HCC) 01/22/2020   • Osteomyelitis of fifth toe of right foot (HCC) 10/07/2019   • Pancreatitis    • Persistent insomnia 01/20/2020   • Renal disorder    • Sleep apnea 02/06/2017   • Sleep apnea with use of continuous positive airway pressure (CPAP)     NON-COMPLIANT   • Slow transit constipation 01/16/2019   • Spinal stenosis in cervical region 09/16/2021   • Vitamin D deficiency 03/02/2021     Past Surgical History:   Procedure Laterality Date   • ABDOMINAL SURGERY     • AMPUTATION FOOT / TOE Left 10/2021    5th digit    • APPENDECTOMY     • BACK SURGERY     • CARDIAC CATHETERIZATION Left 02/08/2021    Procedure: Left Heart Cath w poss intervention left anatomical snuff box acess;  Surgeon: Omkar Charles DO;  Location:  [facility];  Service: Cardiology;  Laterality: Left;   • CARDIAC SURGERY     • CATARACT EXTRACTION     • CERVICAL SPINE SURGERY     • COLONOSCOPY N/A 01/31/2017    Normal exam repeat in 5 years   • COLONOSCOPY N/A 02/11/2019    Mild acute inflammation   • COLONOSCOPY W/ POLYPECTOMY  03/04/2014    Hyperplastic polyp   • CORONARY ARTERY BYPASS GRAFT  10/2015   • ENDOSCOPY  04/13/2011    Gastritis with hemorrhage   • ENDOSCOPY N/A 05/05/2017    Normal exam   • ENDOSCOPY N/A 02/11/2019    Gastritis   • ENDOSCOPY N/A 09/01/2020    Non-erosive gastritis with hemorrhage   • ENDOSCOPY N/A 02/10/2021    Esophagitis   • FOOT SURGERY Left    • INCISION AND DRAINAGE OF WOUND Left 09/2007    spider bite         OT ASSESSMENT FLOWSHEET (last 12 hours)     OT Evaluation and Treatment     Row Name 08/05/22 1430                   OT Time and Intention    Subjective Information complains of;pain  -AC        Document Type evaluation  -AC        Mode of Treatment occupational therapy  -AC                  General  Information    Patient Profile Reviewed yes  -AC        Onset of Illness/Injury or Date of Surgery 08/05/22  -        Referring Physician Dr. Soliz  -        General Observations of Patient aspen collar  -        Prior Level of Function independent:;all household mobility;community mobility;gait;transfer;bed mobility;ADL's  -AC        Equipment Currently Used at Home --  hand held shower head  -AC        Pertinent History of Current Functional Problem BUE pain, neck pain with myelopathy s/p C5-6 anterior cervical discectomy and fusion, partial corpectomy on 8/5/22  -        Existing Precautions/Restrictions fall;cervical collar;brace on at all times;spinal  -AC        Barriers to Rehab none identified  -                  Living Environment    Current Living Arrangements home  tub shower  -        Home Accessibility stairs to enter home  -AC        People in Home spouse;child(julia), adult  -                  Home Main Entrance    Number of Stairs, Main Entrance one  -AC                  Pain Assessment    Pretreatment Pain Rating 8/10  -AC        Posttreatment Pain Rating 8/10  -AC        Pain Location - neck  -AC        Pain Intervention(s) Repositioned;Ambulation/increased activity;Nursing Notified  -AC                  Cognition    Orientation Status (Cognition) oriented x 4  -AC        Follows Commands (Cognition) WNL  -AC        Personal Safety Interventions elopement precautions initiated;fall prevention program maintained;gait belt;muscle strengthening facilitated;nonskid shoes/slippers when out of bed;supervised activity  -AC                  Range of Motion Comprehensive    Comment, General Range of Motion B shoulders limited 50%, B biceps limited 25% due to pt/s muscle bulk and body habitus, all other joints WFL AROM  -AC                  Strength Comprehensive (MMT)    Comment, General Manual Muscle Testing (MMT) Assessment 5/5   -                  Activities of Daily Living    BADL  Assessment/Intervention lower body dressing  -AC                  Lower Body Dressing Assessment/Training    Newton Level (Lower Body Dressing) don;doff;socks;maximum assist (25% patient effort)  -AC        Position (Lower Body Dressing) edge of bed sitting  -AC                  BADL Safety/Performance    Impairments, BADL Safety/Performance pain;range of motion;balance  -AC                  Bed Mobility    Bed Mobility supine-sit;sit-supine  -AC        Supine-Sit Newton (Bed Mobility) standby assist  -AC        Sit-Supine Newton (Bed Mobility) standby assist  -AC        Assistive Device (Bed Mobility) bed rails;head of bed elevated  -                  Functional Mobility    Functional Mobility- Ind. Level contact guard assist  -AC        Functional Mobility- Comment to door and back to bed  -AC                  Transfer Assessment/Treatment    Transfers sit-stand transfer;stand-sit transfer  -                  Transfers    Sit-Stand Newton (Transfers) contact guard  -AC        Stand-Sit Newton (Transfers) contact guard  -AC                  Safety Issues, Functional Mobility    Impairments Affecting Function (Mobility) pain;range of motion (ROM);balance  -AC                  Balance    Balance Assessment sitting static balance;sitting dynamic balance;standing static balance;standing dynamic balance  -AC        Static Sitting Balance independent  -AC        Dynamic Sitting Balance independent  -AC        Position, Sitting Balance sitting edge of bed  -AC        Static Standing Balance contact guard  -AC        Dynamic Standing Balance contact guard  -AC                  Wound 08/05/22 1131 anterior neck Incision    Wound - Properties Group Placement Date: 08/05/22  -JACIEL Placement Time: 1131 -JB Orientation: anterior  -JACIEL Location: neck  -JACIEL Primary Wound Type: Incision  -JACIEL        Retired Wound - Properties Group Placement Date: 08/05/22  -JACIEL Placement Time: 1131 -JB Orientation:  anterior  -JACIEL Location: neck  -JACIEL Primary Wound Type: Incision  -JACIEL        Retired Wound - Properties Group Date first assessed: 08/05/22  -JACIEL Time first assessed: 1131  -JACIEL Location: neck  -JACIEL Primary Wound Type: Incision  -JACIEL                  Plan of Care Review    Plan of Care Reviewed With patient;spouse  -        Progress no change  -        Outcome Evaluation OT eval completed.  Pt groggy but awake and oriented x4.  Alerts well enough to participate safely in eval activities.  SBA for log roll to EOB.  Log roll technique difficult for pt due to body habitus.  Pt has chronic B shoulder ROM impairment due to arthritis and bone spurs.  Pt stood with CGA and ambulated to door.  MaxA currently for donning socks due to pain.  Pt and spouse were educated on spinal precautions, aspen collar wearing schedule and proper fit of aspen collar.  OT will continue to treat to increase pt's independence with ADL.  -AC                  Positioning and Restraints    Pre-Treatment Position in bed  -AC        Post Treatment Position bed  -AC        In Bed fowlers;call light within reach;encouraged to call for assist;exit alarm on;with family/caregiver;side rails up x2;SCD pump applied;RUE elevated;LUE elevated;with brace  -                  Therapy Assessment/Plan (OT)    Date of Referral to OT 08/05/22  -AC        OT Diagnosis decreased adl  -AC        Rehab Potential (OT) good, to achieve stated therapy goals  -        Criteria for Skilled Therapeutic Interventions Met (OT) yes;meets criteria;skilled treatment is necessary  -        Therapy Frequency (OT) 3 times/wk  -        Predicted Duration of Therapy Intervention (OT) 10 days  -        Planned Therapy Interventions (OT) activity tolerance training;BADL retraining;functional balance retraining;occupation/activity based interventions;orthotic fabrication/fitting/training;patient/caregiver education/training;transfer/mobility retraining  -AC                  OT  Goals    Transfer Goal Selection (OT) transfer, OT goal 1  -AC        Dressing Goal Selection (OT) dressing, OT goal 1  -AC                  Transfer Goal 1 (OT)    Activity/Assistive Device (Transfer Goal 1, OT) tub  -AC        Otero Level/Cues Needed (Transfer Goal 1, OT) standby assist  -AC        Time Frame (Transfer Goal 1, OT) long term goal (LTG);10 days  -AC        Progress/Outcome (Transfer Goal 1, OT) new goal  -AC                  Dressing Goal 1 (OT)    Activity/Device (Dressing Goal 1, OT) dressing skills, all  -AC        Otero/Cues Needed (Dressing Goal 1, OT) minimum assist (75% or more patient effort)  -AC        Time Frame (Dressing Goal 1, OT) long term goal (LTG);10 days  -AC        Progress/Outcome (Dressing Goal 1, OT) new goal  -AC              User Key  (r) = Recorded By, (t) = Taken By, (c) = Cosigned By    Initials Name Effective Dates    Ezekiel Ponce, OTR/L, CNT 04/09/19 -     Tj Lancaster RN 02/17/22 -                        OT Recommendation and Plan  Planned Therapy Interventions (OT): activity tolerance training, BADL retraining, functional balance retraining, occupation/activity based interventions, orthotic fabrication/fitting/training, patient/caregiver education/training, transfer/mobility retraining  Therapy Frequency (OT): 3 times/wk  Plan of Care Review  Plan of Care Reviewed With: patient, spouse  Progress: no change  Outcome Evaluation: OT eval completed.  Pt groggy but awake and oriented x4.  Alerts well enough to participate safely in eval activities.  SBA for log roll to EOB.  Log roll technique difficult for pt due to body habitus.  Pt has chronic B shoulder ROM impairment due to arthritis and bone spurs.  Pt stood with CGA and ambulated to door.  MaxA currently for donning socks due to pain.  Pt and spouse were educated on spinal precautions, aspen collar wearing schedule and proper fit of aspen collar.  OT will continue to treat to increase pt's  independence with ADL.  Plan of Care Reviewed With: patient, spouse  Outcome Evaluation: OT eval completed.  Pt groggy but awake and oriented x4.  Alerts well enough to participate safely in eval activities.  SBA for log roll to EOB.  Log roll technique difficult for pt due to body habitus.  Pt has chronic B shoulder ROM impairment due to arthritis and bone spurs.  Pt stood with CGA and ambulated to door.  MaxA currently for donning socks due to pain.  Pt and spouse were educated on spinal precautions, aspen collar wearing schedule and proper fit of aspen collar.  OT will continue to treat to increase pt's independence with ADL.     Outcome Measures     Row Name 08/05/22 1430             How much help from another is currently needed...    Putting on and taking off regular lower body clothing? 2  -AC      Bathing (including washing, rinsing, and drying) 3  -AC      Toileting (which includes using toilet bed pan or urinal) 3  -AC      Putting on and taking off regular upper body clothing 3  -AC      Taking care of personal grooming (such as brushing teeth) 4  -AC      Eating meals 4  -AC      AM-PAC 6 Clicks Score (OT) 19  -AC              Functional Assessment    Outcome Measure Options AM-PAC 6 Clicks Daily Activity (OT)  -AC            User Key  (r) = Recorded By, (t) = Taken By, (c) = Cosigned By    Initials Name Provider Type    Ezekiel Ponce, OTR/L, KEN Occupational Therapist                Time Calculation:    Time Calculation- OT     Row Name 08/05/22 1531             Time Calculation- OT    OT Start Time 1430  -      OT Stop Time 1530  -      OT Time Calculation (min) 60 min  -      OT Received On 08/05/22  -      OT Goal Re-Cert Due Date 08/15/22  -            User Key  (r) = Recorded By, (t) = Taken By, (c) = Cosigned By    Initials Name Provider Type    Ezekiel Ponce, OTR/L, KEN Occupational Therapist              Therapy Charges for Today     Code Description Service Date Service  Provider Modifiers Qty    76920012103 HC OT EVAL LOW COMPLEXITY 4 8/5/2022 Ezekiel Blake, OTR/L, CNT GO 1               ALEXANDER Leon/L, KEN  8/5/2022

## 2022-08-05 NOTE — ANESTHESIA PREPROCEDURE EVALUATION
Anesthesia Evaluation     Patient summary reviewed   NPO Solid Status: > 8 hours             Airway   Mallampati: III  TM distance: >3 FB  Neck ROM: full  Dental      Pulmonary    (+) sleep apnea,   (-) COPD, asthma, not a smoker  Cardiovascular   Exercise tolerance: good (4-7 METS)    ECG reviewed    (+) hypertension, CAD, CABG (2012), hyperlipidemia,   (-) pacemaker, past MI, angina, cardiac stents      Neuro/Psych  (-) seizures, TIA, CVA  GI/Hepatic/Renal/Endo    (+) morbid obesity, GERD,  renal disease CRI, diabetes mellitus using insulin,   (-) liver disease    Musculoskeletal     Abdominal    Substance History      OB/GYN          Other                        Anesthesia Plan    ASA 3     general     (5 lead ekg)  intravenous induction     Anesthetic plan, risks, benefits, and alternatives have been provided, discussed and informed consent has been obtained with: patient.        CODE STATUS:

## 2022-08-05 NOTE — ANESTHESIA PROCEDURE NOTES
Airway  Urgency: elective    Date/Time: 8/5/2022 10:46 AM  Airway not difficult    General Information and Staff    Patient location during procedure: OR  CRNA/CAA: Luciana Bonilla CRNA    Indications and Patient Condition  Indications for airway management: airway protection    Preoxygenated: yes  Mask difficulty assessment: 2 - vent by mask + OA or adjuvant +/- NMBA    Final Airway Details  Final airway type: endotracheal airway      Successful airway: ETT and NIM tube  Cuffed: yes   Successful intubation technique: direct laryngoscopy  Facilitating devices/methods: Bougie  Endotracheal tube insertion site: oral  Blade: Pal  Blade size: 4  ETT size (mm): 7.0  Cormack-Lehane Classification: grade IIb - view of arytenoids or posterior of glottis only  Placement verified by: chest auscultation and capnometry   Cuff volume (mL): 8  Measured from: lips  ETT/EBT  to lips (cm): 22  Number of attempts at approach: 1  Assessment: lips, teeth, and gum same as pre-op and atraumatic intubation    Additional Comments  Blue line visualized at level of cords

## 2022-08-05 NOTE — H&P
Subjective         HPI: This is a 66-year-old male gentleman who we have been following for chronic neck pain with left greater than right upper extremity pain and numbness and tingling.  Patient did go through physical therapy without any improvement.  The patient has been working with his primary care doctor in regards to getting pain medication.  He has not had any injections in his neck.  The patient has had a previous neck surgery by Dr. Diana at C6-7.  He did well from that surgery.  He does have known adjacent level degeneration at C5-6 that is causing cord compression and bilateral foraminal narrowing.  We did have the patient set up for surgery however he did have osteomyelitis in his foot and was working with his podiatrist.  He has been cleared by his podiatrist from the infection and presents today to discuss surgical intervention.  Dr. Soliz has seen him in the past and discussed doing an adjacent level fusion at C5-6.  He continues to complain of neck pain and pain radiating into his arms bilaterally with the left being worse than the right.     Review of Systems   Constitutional: Positive for activity change.   Musculoskeletal: Positive for arthralgias, myalgias and neck pain.   Neurological: Positive for numbness.   Psychiatric/Behavioral: Negative.    All other systems reviewed and are negative.        Medical History        Past Medical History:   Diagnosis Date   • Arthritis     • Autonomic disease     • CAD (coronary artery disease) 2/6/2017   • Cervical radiculopathy 9/16/2021   • Chronic constipation with acute exaccerbation 5/10/2021   • Coronary artery disease     • Degeneration of cervical intervertebral disc 8/11/2021   • Diabetes mellitus (HCC)     • Diabetic foot ulcer (HCC) 8/31/2020   • Diabetic polyneuropathy associated with type 2 diabetes mellitus (HCC) 1/18/2021   • Gastroesophageal reflux disease 5/13/2019   • HTN (hypertension), benign 5/3/2017   • Hyperlipidemia     •  Hypertension     • Mixed hyperlipidemia 2/7/2017   • Multiple lung nodules 1/26/2020     5mm, 9 mm RLL identified 1/2020, not present 10/2019.   • Myocardial infarction (HCC)     • Osteomyelitis (HCC) 1/22/2020   • Osteomyelitis of fifth toe of right foot (HCC) 10/7/2019   • Pancreatitis     • Persistent insomnia 1/20/2020   • Renal disorder     • Sleep apnea 2/6/2017   • Sleep apnea with use of continuous positive airway pressure (CPAP)     • Slow transit constipation 1/16/2019   • Spinal stenosis in cervical region 9/16/2021   • Vitamin D deficiency 3/2/2021         Surgical History         Past Surgical History:   Procedure Laterality Date   • ABDOMINAL SURGERY       • AMPUTATION FOOT / TOE Left 10/2021     5th digit    • APPENDECTOMY       • BACK SURGERY       • CARDIAC CATHETERIZATION Left 2/8/2021     Procedure: Left Heart Cath w poss intervention left anatomical snuff box acess;  Surgeon: Omkar Charles DO;  Location:  [facility];  Service: Cardiology;  Laterality: Left;   • CARDIAC SURGERY       • CATARACT EXTRACTION       • CERVICAL SPINE SURGERY       • COLONOSCOPY N/A 1/31/2017     Normal exam repeat in 5 years   • COLONOSCOPY N/A 2/11/2019     Mild acute inflammation   • COLONOSCOPY W/ POLYPECTOMY   03/04/2014     Hyperplastic polyp   • CORONARY ARTERY BYPASS GRAFT   10/2015   • ENDOSCOPY   04/13/2011     Gastritis with hemorrhage   • ENDOSCOPY N/A 5/5/2017     Normal exam   • ENDOSCOPY N/A 2/11/2019     Gastritis   • ENDOSCOPY N/A 9/1/2020     Non-erosive gastritis with hemorrhage   • ENDOSCOPY N/A 2/10/2021     Esophagitis   • INCISION AND DRAINAGE OF WOUND Left 09/2007     spider bite               Family History   Problem Relation Age of Onset   • Colon cancer Father     • Heart disease Father     • Colon cancer Sister     • Colon polyps Sister     • Alzheimer's disease Mother     • Coronary artery disease Sister     • Coronary artery disease Sister        Social History  "           Tobacco Use   • Smoking status: Former Smoker       Quit date:        Years since quittin.3   • Smokeless tobacco: Never Used   • Tobacco comment: smoked in highschool   Vaping Use   • Vaping Use: Never used   Substance Use Topics   • Alcohol use: No   • Drug use: No        Prescriptions Prior to Admission   (Not in a hospital admission)      Allergies:  Cefepime, Bactrim [sulfamethoxazole-trimethoprim], Clindamycin, Vancomycin, Zolpidem, and Metronidazole           Objective          Vital Signs  /70   Ht 182.9 cm (72\")   Wt (!) 155 kg (342 lb)   BMI 46.38 kg/m²      Physical Exam  Constitutional:       Appearance: Normal appearance. He is well-developed.   HENT:      Head: Normocephalic.   Eyes:      General: Lids are normal.      Extraocular Movements: EOM normal.      Conjunctiva/sclera: Conjunctivae normal.      Pupils: Pupils are equal, round, and reactive to light.   Cardiovascular:      Rate and Rhythm: Normal rate and regular rhythm.   Pulmonary:      Effort: Pulmonary effort is normal.      Breath sounds: Normal breath sounds.   Musculoskeletal:         General: Normal range of motion.      Cervical back: Normal range of motion.      Comments: Neck pain   Skin:     General: Skin is warm.   Neurological:      Mental Status: He is alert and oriented to person, place, and time.      GCS: GCS eye subscore is 4. GCS verbal subscore is 5. GCS motor subscore is 6.      Cranial Nerves: No cranial nerve deficit.      Sensory: No sensory deficit.      Gait: Gait is intact.      Deep Tendon Reflexes: Strength normal and reflexes are normal and symmetric. Reflexes normal.   Psychiatric:         Speech: Speech normal.         Behavior: Behavior normal.         Thought Content: Thought content normal.            Neurologic Exam      Mental Status   Oriented to person, place, and time.   Attention: normal. Concentration: normal.   Speech: speech is normal   Level of consciousness: " alert  Normal comprehension.      Cranial Nerves      CN II   Visual fields full to confrontation.      CN III, IV, VI   Pupils are equal, round, and reactive to light.  Extraocular motions are normal.      CN V   Facial sensation intact.      CN VII   Facial expression full, symmetric.      CN VIII   CN VIII normal.      CN IX, X   CN IX normal.   CN X normal.      CN XI   CN XI normal.      CN XII   CN XII normal.      Motor Exam   Muscle bulk: normal     Strength   Strength 5/5 throughout.      Sensory Exam   Light touch normal.      Gait, Coordination, and Reflexes      Gait  Gait: normal     Reflexes   Reflexes 2+ except as noted.         Imaging review: MRI of the cervical spine that was done on July 21, 2021 shows a previous fusion at C6-7.  At C5-6 there is adjacent level degeneration and disc protrusion with cord compression and bilateral foraminal narrowing with the left being worse than the right.  There is small central disc protrusions at C3-4 and C4-5 however no cord compression and no foraminal narrowing.  No fracture visualized.          Assessment/Plan: Dr. Soliz did review the imaging and did come in and discussed this with the patient.  It is felt the patient would benefit by going to the operating room for an extension of the cervical fusion to C5-6.  Dr. Soliz to go over the risk and benefits of the procedure with the patient.  Please see his addendum on this patient.  We will get the patient referred to ENT for an evaluation for surgery in regards to having a previous ACDF.  We will have him cleared by his primary care doctor.  We will set him up for lab work and COVID testing.  He was told to call us if any further problems or concerns.  Their questions and concerns were addressed.  We will also order a cervical collar for the patient as well.        Patient is a nonsmoker  The patient's Body mass index is 46.38 kg/m².. BMI is above normal parameters. Recommendations include: educational  material and nutrition counseling        Advance Care Planning     ACP discussion was held with the patient during this visit. Patient does not have an advance directive, information provided.        Diagnoses and all orders for this visit:     1. Cervical radiculopathy (Primary)  -     Ambulatory Referral to Internal Medicine  -     Ambulatory Referral to ENT (Otolaryngology)  -     Miscellaneous DME     2. Degeneration of cervical intervertebral disc  -     Ambulatory Referral to Internal Medicine  -     Ambulatory Referral to ENT (Otolaryngology)  -     Miscellaneous DME     3. Spinal stenosis in cervical region  -     Ambulatory Referral to Internal Medicine  -     Ambulatory Referral to ENT (Otolaryngology)  -     Miscellaneous DME     4. Nonsmoker     5. Class 3 severe obesity due to excess calories with body mass index (BMI) of 45.0 to 49.9 in adult, unspecified whether serious comorbidity present (HCC)            I discussed the patients findings and my recommendations with patient     Willy Romero, APRN  04/21/22  15:52 CDT  Attending addendum: 66-year-old gentleman we will follow him for cervical myelopathy and bilateral upper extremity radiculopathy along with neck pain.  He is gone through extensive conservative care course without any improvement.  We did have him scheduled for surgery however he developed osteomyelitis of his foot.  He was taken care of by his podiatrist and had the foot amputated.  Has been off antibiotics now for several weeks and is been cleared from an infection point of view.  He still complains of neck pain and bilateral upper extremity radicular pain numbness and tingling.  He still has some clinical signs of myelopathy.  His MRI shows severe cervical foraminal stenosis and cord compression at C5-6 above his previous fusion at C6-7.  We will recommend a anterior cervical fusion at C5-6.  The risk and benefits were discussed at length which include were not limited to  "infection, bleeding, speech and swallow difficulty, paralysis, spinal fluid leak, bowel bladder incontinence, stroke, coma, and death.  The patient acknowledged understanding this.  His questions and concerns were addressed.  We will get him preop and schedule the soonest possible.                 Office Visit on 4/21/2022     Office Visit on 4/21/2022        Revision History        Detailed Report        ROS Info      Note shared with patient        Additional Documentation    Vitals:  /70   Ht 182.9 cm (72\")   Wt 155 kg (342 lb) Important     BMI 46.38 kg/m²   BSA 2.68 m²   Flowsheets:  Outbreak Screen      Encounter Info:  Billing Info,   History,   Allergies,   Detailed Report                Encounter Information    Encounter Information    Provider Department Encounter #   4/21/2022 2:15 PM Willy Romero APRN [facility] [id number]                         Orders Placed     Ambulatory Referral to Internal Medicine Closed   Ambulatory Referral to ENT (Otolaryngology) Closed   Miscellaneous DME      Medication Changes       None     Medication List           Visit Diagnoses       Cervical radiculopathy     Degeneration of cervical intervertebral disc     Spinal stenosis in cervical region     Nonsmoker     Class 3 severe obesity due to excess calories with body mass index (BMI) of 45.0 to 49.9 in adult, unspecified whether serious comorbidity present (HCC)     Problem List                         "

## 2022-08-05 NOTE — PLAN OF CARE
Goal Outcome Evaluation:  Plan of Care Reviewed With: patient, spouse        Progress: no change  Outcome Evaluation: OT eval completed.  Pt groggy but awake and oriented x4.  Alerts well enough to participate safely in eval activities.  SBA for log roll to EOB.  Log roll technique difficult for pt due to body habitus.  Pt has chronic B shoulder ROM impairment due to arthritis and bone spurs.  Pt stood with CGA and ambulated to door.  MaxA currently for donning socks due to pain.  Pt and spouse were educated on spinal precautions, aspen collar wearing schedule and proper fit of aspen collar.  OT will continue to treat to increase pt's independence with ADL.

## 2022-08-05 NOTE — PLAN OF CARE
Goal Outcome Evaluation:  Plan of Care Reviewed With: patient, spouse        Progress: no change  Outcome Evaluation: Pt A&Ox4 now, was drowsy upon arrival to floor. up with OT today. /ETCO2, room air. n/t extremities - baseline. c/o pain, prn flexeril and morphine given with some relief. DYE, generalized weakness. PPP. ant neck drsg, CDI. c-collar in place, removed for assessment. IVF initiated. DTV. SCD. Spouse at bedside. Call light within reach. Safety maintained. Alarm set.

## 2022-08-05 NOTE — ANESTHESIA POSTPROCEDURE EVALUATION
"Patient: Erick Luong    Procedure Summary     Date: 08/05/22 Room / Location:  [facility] OR  /  [facility]    Anesthesia Start: 1039 Anesthesia Stop: 1247    Procedure: CERVICAL DISCECTOMY ANTERIOR WITH FUSION C5-6 with possible plating of C5-7 with neuromonitoring and 1 c-arm (N/A Spine Cervical) Diagnosis:       Cervical radiculopathy      (Cervical radiculopathy [M54.12])    Surgeons: Karel Soliz MD Provider: Luciana Bonilla CRNA    Anesthesia Type: general ASA Status: 3          Anesthesia Type: general    Vitals  Vitals Value Taken Time   /89 08/05/22 1314   Temp 97.2 °F (36.2 °C) 08/05/22 1314   Pulse 91 08/05/22 1314   Resp 18 08/05/22 1314   SpO2 96 % 08/05/22 1314           Post Anesthesia Care and Evaluation    Patient location during evaluation: PACU  Patient participation: complete - patient participated  Level of consciousness: awake and awake and alert  Pain score: 0  Pain management: adequate    Airway patency: patent  Anesthetic complications: No anesthetic complications  PONV Status: none  Cardiovascular status: acceptable  Respiratory status: acceptable  Hydration status: acceptable    Comments: Patient discharged according to acceptable Yolanda score per RN assessment. See nursing records for further information.     Blood pressure 96/77, pulse 83, temperature 98.2 °F (36.8 °C), temperature source Oral, resp. rate 20, height 179 cm (70.47\"), weight (!) 164 kg (362 lb), SpO2 96 %.        "

## 2022-08-06 VITALS
SYSTOLIC BLOOD PRESSURE: 146 MMHG | HEIGHT: 70 IN | TEMPERATURE: 98.4 F | RESPIRATION RATE: 18 BRPM | WEIGHT: 315 LBS | BODY MASS INDEX: 45.1 KG/M2 | OXYGEN SATURATION: 100 % | HEART RATE: 85 BPM | DIASTOLIC BLOOD PRESSURE: 66 MMHG

## 2022-08-06 LAB
ANION GAP SERPL CALCULATED.3IONS-SCNC: 14 MMOL/L (ref 5–15)
BASOPHILS # BLD AUTO: 0.06 10*3/MM3 (ref 0–0.2)
BASOPHILS NFR BLD AUTO: 0.6 % (ref 0–1.5)
BUN SERPL-MCNC: 34 MG/DL (ref 8–23)
BUN/CREAT SERPL: 16.4 (ref 7–25)
CALCIUM SPEC-SCNC: 8.4 MG/DL (ref 8.6–10.5)
CHLORIDE SERPL-SCNC: 103 MMOL/L (ref 98–107)
CO2 SERPL-SCNC: 19 MMOL/L (ref 22–29)
CREAT SERPL-MCNC: 2.07 MG/DL (ref 0.76–1.27)
DEPRECATED RDW RBC AUTO: 44.1 FL (ref 37–54)
EGFRCR SERPLBLD CKD-EPI 2021: 34.7 ML/MIN/1.73
EOSINOPHIL # BLD AUTO: 0.49 10*3/MM3 (ref 0–0.4)
EOSINOPHIL NFR BLD AUTO: 5 % (ref 0.3–6.2)
ERYTHROCYTE [DISTWIDTH] IN BLOOD BY AUTOMATED COUNT: 14.2 % (ref 12.3–15.4)
GLUCOSE BLDC GLUCOMTR-MCNC: 264 MG/DL (ref 70–130)
GLUCOSE BLDC GLUCOMTR-MCNC: 296 MG/DL (ref 70–130)
GLUCOSE SERPL-MCNC: 303 MG/DL (ref 65–99)
HCT VFR BLD AUTO: 36.8 % (ref 37.5–51)
HGB BLD-MCNC: 12.3 G/DL (ref 13–17.7)
IMM GRANULOCYTES # BLD AUTO: 0.15 10*3/MM3 (ref 0–0.05)
IMM GRANULOCYTES NFR BLD AUTO: 1.5 % (ref 0–0.5)
LYMPHOCYTES # BLD AUTO: 1.26 10*3/MM3 (ref 0.7–3.1)
LYMPHOCYTES NFR BLD AUTO: 12.9 % (ref 19.6–45.3)
MCH RBC QN AUTO: 28.5 PG (ref 26.6–33)
MCHC RBC AUTO-ENTMCNC: 33.4 G/DL (ref 31.5–35.7)
MCV RBC AUTO: 85.2 FL (ref 79–97)
MONOCYTES # BLD AUTO: 1.25 10*3/MM3 (ref 0.1–0.9)
MONOCYTES NFR BLD AUTO: 12.8 % (ref 5–12)
NEUTROPHILS NFR BLD AUTO: 6.58 10*3/MM3 (ref 1.7–7)
NEUTROPHILS NFR BLD AUTO: 67.2 % (ref 42.7–76)
NRBC BLD AUTO-RTO: 0 /100 WBC (ref 0–0.2)
PLATELET # BLD AUTO: 192 10*3/MM3 (ref 140–450)
PMV BLD AUTO: 11.8 FL (ref 6–12)
POTASSIUM SERPL-SCNC: 4.9 MMOL/L (ref 3.5–5.2)
RBC # BLD AUTO: 4.32 10*6/MM3 (ref 4.14–5.8)
SODIUM SERPL-SCNC: 136 MMOL/L (ref 136–145)
WBC NRBC COR # BLD: 9.79 10*3/MM3 (ref 3.4–10.8)

## 2022-08-06 PROCEDURE — 25010000002 ONDANSETRON PER 1 MG: Performed by: NURSE PRACTITIONER

## 2022-08-06 PROCEDURE — 80048 BASIC METABOLIC PNL TOTAL CA: CPT | Performed by: NURSE PRACTITIONER

## 2022-08-06 PROCEDURE — 85025 COMPLETE CBC W/AUTO DIFF WBC: CPT | Performed by: NURSE PRACTITIONER

## 2022-08-06 PROCEDURE — 99024 POSTOP FOLLOW-UP VISIT: CPT | Performed by: NEUROLOGICAL SURGERY

## 2022-08-06 PROCEDURE — 97535 SELF CARE MNGMENT TRAINING: CPT

## 2022-08-06 PROCEDURE — 82962 GLUCOSE BLOOD TEST: CPT

## 2022-08-06 PROCEDURE — 25010000002 MORPHINE SULFATE (PF) 2 MG/ML SOLUTION: Performed by: NURSE PRACTITIONER

## 2022-08-06 PROCEDURE — 97162 PT EVAL MOD COMPLEX 30 MIN: CPT

## 2022-08-06 PROCEDURE — 63710000001 INSULIN LISPRO (HUMAN) PER 5 UNITS: Performed by: NURSE PRACTITIONER

## 2022-08-06 RX ORDER — CYCLOBENZAPRINE HCL 10 MG
10 TABLET ORAL 3 TIMES DAILY PRN
Qty: 30 TABLET | Refills: 0 | Status: ON HOLD | OUTPATIENT
Start: 2022-08-06 | End: 2023-01-20

## 2022-08-06 RX ORDER — OXYCODONE AND ACETAMINOPHEN 7.5; 325 MG/1; MG/1
1 TABLET ORAL EVERY 4 HOURS PRN
Qty: 42 TABLET | Refills: 0 | Status: ON HOLD | OUTPATIENT
Start: 2022-08-06 | End: 2023-01-20

## 2022-08-06 RX ORDER — OXYCODONE AND ACETAMINOPHEN 7.5; 325 MG/1; MG/1
1 TABLET ORAL EVERY 4 HOURS PRN
Qty: 42 TABLET | Refills: 0 | Status: SHIPPED | OUTPATIENT
Start: 2022-08-06 | End: 2022-08-07 | Stop reason: SDUPTHER

## 2022-08-06 RX ORDER — CYCLOBENZAPRINE HCL 10 MG
10 TABLET ORAL 3 TIMES DAILY PRN
Qty: 30 TABLET | Refills: 0 | Status: SHIPPED | OUTPATIENT
Start: 2022-08-06 | End: 2022-08-12

## 2022-08-06 RX ADMIN — DULOXETINE HYDROCHLORIDE 60 MG: 30 CAPSULE, DELAYED RELEASE ORAL at 09:12

## 2022-08-06 RX ADMIN — CARVEDILOL 6.25 MG: 6.25 TABLET, FILM COATED ORAL at 09:12

## 2022-08-06 RX ADMIN — PANTOPRAZOLE SODIUM 40 MG: 40 TABLET, DELAYED RELEASE ORAL at 06:29

## 2022-08-06 RX ADMIN — OXYCODONE HYDROCHLORIDE AND ACETAMINOPHEN 1 TABLET: 7.5; 325 TABLET ORAL at 12:12

## 2022-08-06 RX ADMIN — MORPHINE SULFATE 1 MG: 2 INJECTION, SOLUTION INTRAMUSCULAR; INTRAVENOUS at 09:16

## 2022-08-06 RX ADMIN — MORPHINE SULFATE 1 MG: 2 INJECTION, SOLUTION INTRAMUSCULAR; INTRAVENOUS at 14:38

## 2022-08-06 RX ADMIN — LOSARTAN POTASSIUM 50 MG: 50 TABLET, FILM COATED ORAL at 09:12

## 2022-08-06 RX ADMIN — PREGABALIN 50 MG: 50 CAPSULE ORAL at 09:12

## 2022-08-06 RX ADMIN — TAMSULOSIN HYDROCHLORIDE 0.4 MG: 0.4 CAPSULE ORAL at 09:12

## 2022-08-06 RX ADMIN — INSULIN LISPRO 12 UNITS: 100 INJECTION, SOLUTION INTRAVENOUS; SUBCUTANEOUS at 09:13

## 2022-08-06 RX ADMIN — OXYCODONE HYDROCHLORIDE AND ACETAMINOPHEN 1 TABLET: 7.5; 325 TABLET ORAL at 02:29

## 2022-08-06 RX ADMIN — DONEPEZIL HYDROCHLORIDE 10 MG: 10 TABLET, FILM COATED ORAL at 09:12

## 2022-08-06 RX ADMIN — BUMETANIDE 1 MG: 1 TABLET ORAL at 09:12

## 2022-08-06 RX ADMIN — COLCHICINE 0.6 MG: 0.6 TABLET, FILM COATED ORAL at 09:12

## 2022-08-06 RX ADMIN — INSULIN LISPRO 12 UNITS: 100 INJECTION, SOLUTION INTRAVENOUS; SUBCUTANEOUS at 12:12

## 2022-08-06 RX ADMIN — DOCUSATE SODIUM 100 MG: 100 CAPSULE, LIQUID FILLED ORAL at 09:12

## 2022-08-06 RX ADMIN — Medication 3 ML: at 09:13

## 2022-08-06 RX ADMIN — SODIUM CHLORIDE 75 ML/HR: 9 INJECTION, SOLUTION INTRAVENOUS at 03:59

## 2022-08-06 RX ADMIN — PREGABALIN 50 MG: 50 CAPSULE ORAL at 16:29

## 2022-08-06 RX ADMIN — ONDANSETRON 4 MG: 2 INJECTION INTRAMUSCULAR; INTRAVENOUS at 09:16

## 2022-08-06 NOTE — PLAN OF CARE
Goal Outcome Evaluation:  Plan of Care Reviewed With: patient, spouse        Progress: improving  Outcome Evaluation: Pt A&Ox4. /ETCO2 - room air. DYE, generalized weakness. PPP. SCD. C-collar in place, removed for assessment - no skin breakdown noted. ant neck drsg, CDI. BG monitored. c/o pain, prn pain medication given with little relief, repositioning with pillows. reports baseline n/t fingers/hands. IVF. Voiding. Call light within reach, safety maintained. Alarm set. Pt currently sitting up in chair. Per MD Heydi - pt to be DC home today.

## 2022-08-06 NOTE — THERAPY EVALUATION
Patient Name: Erick Luong  : 1955    MRN: [id number]                              Today's Date: 2022       Admit Date: 2022    Visit Dx:     ICD-10-CM ICD-9-CM   1. Cervical radiculopathy  M54.12 723.4   2. Decreased activities of daily living (ADL)  Z78.9 V49.89     Patient Active Problem List   Diagnosis   • Obesity, unspecified obesity severity, unspecified obesity type   • Essential hypertension   • Type 2 diabetes mellitus with hyperglycemia, with long-term current use of insulin ([facility])   • Nonsmoker   • Anemia due to chronic kidney disease   • Class 3 severe obesity due to excess calories with body mass index (BMI) of 45.0 to 49.9 in adult ([facility])   • Anasarca   • Sleep apnea with use of continuous positive airway pressure (CPAP)   • CKD (chronic kidney disease) stage 4, GFR 15-29 ml/min ([facility])   • Medically noncompliant   • Diabetic foot ulcer ([facility])   • Diabetic polyneuropathy associated with type 2 diabetes mellitus ([facility])   • Spinal stenosis in cervical region   • Degeneration of cervical intervertebral disc   • Cervical radiculopathy   • Degeneration of lumbar or lumbosacral intervertebral disc   • Cervical myelopathy ([facility])     Past Medical History:   Diagnosis Date   • Arthritis    • Autonomic disease    • CAD (coronary artery disease) 2017   • Cervical radiculopathy 2021   • Chronic constipation with acute exaccerbation 05/10/2021   • Coronary artery disease    • Degeneration of cervical intervertebral disc 2021   • Diabetes mellitus ([facility])    • Diabetic foot ulcer (HCC) 2020   • Diabetic polyneuropathy associated with type 2 diabetes mellitus (HCC) 2021   • Elevated cholesterol    • Gastroesophageal reflux disease 2019   • HTN (hypertension), benign 2017   • Hyperlipidemia    • Hypertension    • Mixed hyperlipidemia 2017   • Multiple lung nodules 2020    5mm, 9 mm RLL identified 2020, not present 10/2019.   • Myocardial infarction (HCC)     • Osteomyelitis (HCC) 01/22/2020   • Osteomyelitis of fifth toe of right foot (HCC) 10/07/2019   • Pancreatitis    • Persistent insomnia 01/20/2020   • Renal disorder    • Sleep apnea 02/06/2017   • Sleep apnea with use of continuous positive airway pressure (CPAP)     NON-COMPLIANT   • Slow transit constipation 01/16/2019   • Spinal stenosis in cervical region 09/16/2021   • Vitamin D deficiency 03/02/2021     Past Surgical History:   Procedure Laterality Date   • ABDOMINAL SURGERY     • AMPUTATION FOOT / TOE Left 10/2021    5th digit    • APPENDECTOMY     • BACK SURGERY     • CARDIAC CATHETERIZATION Left 02/08/2021    Procedure: Left Heart Cath w poss intervention left anatomical snuff box acess;  Surgeon: Omkar Charles DO;  Location:  [facility];  Service: Cardiology;  Laterality: Left;   • CARDIAC SURGERY     • CATARACT EXTRACTION     • CERVICAL SPINE SURGERY     • COLONOSCOPY N/A 01/31/2017    Normal exam repeat in 5 years   • COLONOSCOPY N/A 02/11/2019    Mild acute inflammation   • COLONOSCOPY W/ POLYPECTOMY  03/04/2014    Hyperplastic polyp   • CORONARY ARTERY BYPASS GRAFT  10/2015   • ENDOSCOPY  04/13/2011    Gastritis with hemorrhage   • ENDOSCOPY N/A 05/05/2017    Normal exam   • ENDOSCOPY N/A 02/11/2019    Gastritis   • ENDOSCOPY N/A 09/01/2020    Non-erosive gastritis with hemorrhage   • ENDOSCOPY N/A 02/10/2021    Esophagitis   • FOOT SURGERY Left    • INCISION AND DRAINAGE OF WOUND Left 09/2007    spider bite      General Information     Row Name 08/06/22 1318          Physical Therapy Time and Intention    Document Type evaluation  BUE pain, neck pain with myelopathy s/p C5-6 anterior cervical discectomy and fusion, partial corpectomy on 8/5/22  -SCOTT     Mode of Treatment physical therapy  -SCOTT (r) DEIDRE (t) SCOTT (c)     Row Name 08/06/22 1318          General Information    Patient Profile Reviewed yes  -SCOTT (r) DEIDRE (t) SCOTT (c)     Prior Level of Function independent:;all  household mobility;ADL's;home management  -SCOTT (r) DEIDRE (t) SCOTT (c)     Existing Precautions/Restrictions fall;cervical collar;brace on at all times;spinal  -SCOTT     Barriers to Rehab none identified  -SCOTT (r) DEIDRE (t) SCOTT (c)     Row Name 08/06/22 1318          Living Environment    People in Home spouse;child(julia), adult  -SCOTT (r) DEIDRE (t) SCOTT (c)     Row Name 08/06/22 1318          Home Main Entrance    Number of Stairs, Main Entrance one  -SCOTT (r) DEIDRE (t) SCOTT (c)     Stair Railings, Main Entrance none  -SCOTT (r) DEIDRE (t) SCOTT (c)     Row Name 08/06/22 1318          Stairs Within Home, Primary    Number of Stairs, Within Home, Primary none  -SCOTT (r) DEIDRE (t) SCOTT (c)     Stair Railings, Within Home, Primary none  -SCOTT (r) DEIDRE (t) SCOTT (c)     Row Name 08/06/22 1318          Cognition    Orientation Status (Cognition) oriented x 4;person;place;situation;time  -SCOTT (r) DEIDRE (t) SCOTT (c)     Row Name 08/06/22 1318          Safety Issues, Functional Mobility    Impairments Affecting Function (Mobility) pain;balance  -SCOTT (r) DEIDRE (t) SCOTT (c)           User Key  (r) = Recorded By, (t) = Taken By, (c) = Cosigned By    Initials Name Provider Type    Luiz Lakhani, PT DPT Physical Therapist    Nathan Ramirez, URSZULA Student PT Student               Mobility     Row Name 08/06/22 1318          Bed Mobility    Bed Mobility supine-sit;sit-supine  -SCOTT (r) DEIDRE (t) SCOTT (c)     Supine-Sit Cascade (Bed Mobility) standby assist  -SCOTT (r) DEIDRE (t) SCOTT (c)     Sit-Supine [place] (Bed Mobility) standby assist  -SCOTT (r) DEIDRE (t) SCOTT (c)     Assistive Device (Bed Mobility) bed rails;head of bed elevated  -SCOTT (r) DEIDRE (t) SCOTT (c)     Comment, (Bed Mobility) Pt starting position was fowlers  -SCOTT (r) DEIDRE (t) SCOTT (c)     Row Name 08/06/22 1318          Bed-Chair Transfer    Bed-Chair Cascade (Transfers) not tested  -SCOTT (r) DEIDRE (t) SCOTT viveros)     Ozzie Name 08/06/22 1318          Sit-Stand Transfer    Sit-Stand [place] (Transfers) contact guard  -SCOTT spears) DEIDRE (chelo) SCOTT viveros)     Ozzie  Name 08/06/22 1318          Gait/Stairs (Locomotion)    [place] Level (Gait) contact guard  -SCOTT (r) DEIDRE (t) SCOTT (c)     Distance in Feet (Gait) 200 ft  -SCOTT (r) DEIDRE (t) SCOTT (c)     [place] Level (Stairs) not tested  -SCOTT (r) DEIDRE (t) SCOTT (c)           User Key  (r) = Recorded By, (t) = Taken By, (c) = Cosigned By    Initials Name Provider Type    Luiz Lakhani PT DPT Physical Therapist    Nathan Ramirez, PT Student PT Student               Obj/Interventions     Row Name 08/06/22 1318          Range of Motion Comprehensive    General Range of Motion bilateral lower extremity ROM WFL  -SCOTT (r) DEIDRE (t) SCOTT (c)     Row Name 08/06/22 1318          Strength Comprehensive (MMT)    Comment, General Manual Muscle Testing (MMT) Assessment BLE strength grossly 5/5  -SCOTT (r) DEIDRE (t) SCOTT (c)     Row Name 08/06/22 1318          Balance    Balance Assessment sitting static balance;sitting dynamic balance;sit to stand dynamic balance;standing static balance;standing dynamic balance  -SCOTT (r) DEIDRE (t) SCOTT (c)     Static Sitting Balance independent  -SCOTT (r) DEIDRE (t) SCOTT (c)     Dynamic Sitting Balance independent  -SCOTT (r) DEIDRE (t) SCOTT (c)     Position, Sitting Balance unsupported;sitting edge of bed  -SCOTT (r) DEIDRE (t) SCOTT (c)     Sit to Stand Dynamic Balance standby assist  -SCOTT (r) DEIDRE (t) SCOTT (c)     Static Standing Balance contact guard  -SCOTT (r) DEIDRE (t) SCOTT (c)     Dynamic Standing Balance contact guard  -SCOTT (r) DEIDRE (t) SCOTT (c)     Position/Device Used, Standing Balance unsupported  -SCOTT (r) DEIDRE (t) SCOTT (c)     Balance Interventions sitting;standing;sit to stand;static;dynamic  -SCOTT (r) DEIDRE (t) SCOTT (c)     Row Name 08/06/22 1318          Sensory Assessment (Somatosensory)    Sensory Assessment (Somatosensory) sensation intact  -SCOTT (r) DEIDRE (t) SCOTT (c)           User Key  (r) = Recorded By, (t) = Taken By, (c) = Cosigned By    Initials Name Provider Type    SCOTT O'Wright, Luiz A, PT DPT Physical Therapist    Nathan Ramirez, PT Student PT Student                Goals/Plan     Row Name 08/06/22 1318          Bed Mobility Goal 1 (PT)    Activity/Assistive Device (Bed Mobility Goal 1, PT) sit to supine;supine to sit  -SCOTT (r) DEIDRE (t) SCOTT (c)     [place] Level/Cues Needed (Bed Mobility Goal 1, PT) independent  -SCOTT (r) DEIDRE (t) SCOTT (c)     Time Frame (Bed Mobility Goal 1, PT) long term goal (LTG);10 days  -SCOTT (r) DEIDRE (t) SCOTT (c)     Progress/Outcomes (Bed Mobility Goal 1, PT) goal ongoing  -SCOTT (r) DEIDRE (t) SCOTT (c)     Row Name 08/06/22 1318          Transfer Goal 1 (PT)    Activity/Assistive Device (Transfer Goal 1, PT) sit-to-stand/stand-to-sit;bed-to-chair/chair-to-bed  -SCOTT (r) DEIDRE (t) SCOTT (c)     [place] Level/Cues Needed (Transfer Goal 1, PT) independent  -SCOTT (r) DEIDRE (t) SCOTT (c)     Time Frame (Transfer Goal 1, PT) long term goal (LTG);10 days  -SCOTT (r) DEIDRE (t) SCOTT (c)     Progress/Outcome (Transfer Goal 1, PT) goal ongoing  -SCOTT     Row Name 08/06/22 1318          Gait Training Goal 1 (PT)    Activity/Assistive Device (Gait Training Goal 1, PT) gait (walking locomotion);decrease fall risk;improve balance and speed;increase endurance/gait distance  -SCOTT (r) DEIDRE (t) SCOTT (c)     [place] Level (Gait Training Goal 1, PT) independent  -SCOTT (r) DEIDRE (t) SCOTT (c)     Distance (Gait Training Goal 1, PT) 400 ft  -SCOTT (r) DEIDRE (t) SCOTT (c)     Time Frame (Gait Training Goal 1, PT) long term goal (LTG);10 days  -SCOTT (r) DEIDRE (t) SCOTT (c)     Progress/Outcome (Gait Training Goal 1, PT) goal ongoing  -SCOTT (r) DEIDRE (t) SCOTT (c)     Row Name 08/06/22 1318          Therapy Assessment/Plan (PT)    Planned Therapy Interventions (PT) balance training;bed mobility training;gait training;home exercise program;patient/family education;postural re-education;strengthening;transfer training;orthotic fitting/training  -SCOTT           User Key  (r) = Recorded By, (t) = Taken By, (c) = Cosigned By    Initials Name Provider Type    Luiz Lakhani, PT DPT Physical Therapist    Nathan Ramirez, PT Student PT Student                Clinical Impression     Row Name 08/06/22 1318          Pain    Pain Intervention(s) Medication (See MAR);Repositioned;Ambulation/increased activity  -SCOTT (r) DEIDRE (t) SCOTT (c)     Additional Documentation Pain Scale: FACES Pre/Post-Treatment (Group)  -SCOTT (r) DEIDRE (t) SCOTT (c)     Row Name 08/06/22 1318          Pain Scale: FACES Pre/Post-Treatment    Pain: FACES Scale, Pretreatment 2-->hurts little bit  -SCOTT (r) DEIDRE (t) SCOTT (c)     Posttreatment Pain Rating 2-->hurts little bit  -SCOTT (r) DEIDRE (t) SCOTT (c)     Pain Location - neck  -SCOTT (r) DEIDRE (t) SCOTT (c)     Row Name 08/06/22 1318          Plan of Care Review    Plan of Care Reviewed With patient  -SCOTT (r) DEIDRE (t) SCOTT (c)     Outcome Evaluation PT Eval complete. Pt A&Ox4. Pt was in fowlers upon arrival and willing to participate in therapy. Pt performed bed mobility to reach EOB with SBA. Sensation was intact. BLE strength grossly 5/5. Pt was able to perform STS transfer with SBA and proceeded to ambulate in hallway with CGA. Pt stated he felt fine with his balance, and there were no recorded falls/moments of LOB. Pt returned to fowlers position with CGA and ended session. Pt will benefit from skilled PT intervention to improve independence with functional tasks and ambulation as well as improve pain. Anticipate d.c to home with assist when ready.  -SCOTT (r) DEIDRE (t) SCOTT (c)     Row Name 08/06/22 1313          Therapy Assessment/Plan (PT)    Patient/Family Therapy Goals Statement (PT) decrease pain  -SCOTT (r) DEIDRE (t) SCOTT (c)     Rehab Potential (PT) good, to achieve stated therapy goals  -SCOTT (r) DEIDRE (t) SCOTT (c)     Criteria for Skilled Interventions Met (PT) yes;skilled treatment is necessary  -SCOTT (r) DEIDRE (t) SCOTT (c)     Therapy Frequency (PT) 2 times/day  -SCOTT (r) DEIDRE (t) SCOTT (c)     Predicted Duration of Therapy Intervention (PT) until d.c  -SCOTT (r) DEIDRE (t) SCOTT (c)     Row Name 08/06/22 4881          Vital Signs    O2 Delivery Pre Treatment supplemental O2  -SCOTT (r) DEIDRE (t) SCOTT (c)     O2  Delivery Intra Treatment room air  -SCOTT (r) DEIDRE (t) SCOTT (c)     O2 Delivery Post Treatment supplemental O2  -SCOTT (r) DEIDRE (t) SCOTT (c)     Pre Patient Position Supine  -SCOTT (r) DEIDRE (t) SCOTT (c)     Intra Patient Position Standing  -SCOTT (r) DEIDRE (t) SCOTT (c)     Post Patient Position Supine  -SCOTT (r) DEIDRE (t) SCOTT (c)     Row Name 08/06/22 1318          Positioning and Restraints    Pre-Treatment Position in bed  -SCOTT (r) DEIDRE (t) SCOTT (c)     Post Treatment Position bed  -SCOTT (r) DEIDRE (t) SCOTT (c)     In Bed fowlers;call light within reach;encouraged to call for assist;side rails up x2;with brace  -SCOTT (r) DEIDRE (t) SCOTT (c)           User Key  (r) = Recorded By, (t) = Taken By, (c) = Cosigned By    Initials Name Provider Type    Luiz Lakhani, PT DPT Physical Therapist    Nathan Ramirez, URSZULA Student PT Student               Outcome Measures     Row Name 08/06/22 1318          How much help from another person do you currently need...    Turning from your back to your side while in flat bed without using bedrails? 4  -SCOTT (r) DEIDRE (t) SCOTT (c)     Moving from lying on back to sitting on the side of a flat bed without bedrails? 3  -SCOTT (r) DEIDRE (t) SCOTT (c)     Moving to and from a bed to a chair (including a wheelchair)? 3  -SCOTT (r) DEIDRE (t) SCOTT (c)     Standing up from a chair using your arms (e.g., wheelchair, bedside chair)? 3  -SCOTT (r) DEIDRE (t) SCOTT (c)     Climbing 3-5 steps with a railing? 3  -SCOTT (r) DEIDRE (t) SCOTT (c)     To walk in hospital room? 3  -SCOTT (r) DEIDRE (t) SCOTT (c)     AM-PAC 6 Clicks Score (PT) 19  -SCOTT (r) DEIDRE (t)     Highest level of mobility 6 --> Walked 10 steps or more  -SCOTT (r) DEIDRE (t)     Row Name 08/06/22 7242          Functional Assessment    Outcome Measure Options AM-PAC 6 Clicks Basic Mobility (PT)  -SCOTT (r) DEIDRE (t) SCOTT (c)           User Key  (r) = Recorded By, (t) = Taken By, (c) = Cosigned By    Initials Name Provider Type    Luiz Lakhani, PT DPT Physical Therapist    Natahn Ramirez, PT Student PT Student                              Physical Therapy Education                 Title: PT OT SLP Therapies (In Progress)     Topic: Physical Therapy (In Progress)     Point: Mobility training (Done)     Learning Progress Summary           Patient Acceptance, E, SAEID FONTANA by DEIDRE at 8/6/2022 9766    Comment: Pt was educated on importance of maintaining precautions as well as the benefit of ambulation to improve strength and endurance with activity.                   Point: Home exercise program (Not Started)     Learner Progress:  Not documented in this visit.          Point: Body mechanics (Not Started)     Learner Progress:  Not documented in this visit.          Point: Precautions (Not Started)     Learner Progress:  Not documented in this visit.                      User Key     Initials Effective Dates Name Provider Type Discipline    DEIDRE 05/04/22 -  Nathan Pandya, PT Student PT Student PT              PT Recommendation and Plan     Plan of Care Reviewed With: patient  Outcome Evaluation: PT Eval complete. Pt A&Ox4. Pt was in fowlers upon arrival and willing to participate in therapy. Pt performed bed mobility to reach EOB with SBA. Sensation was intact. BLE strength grossly 5/5. Pt was able to perform STS transfer with SBA and proceeded to ambulate in hallway with CGA. Pt stated he felt fine with his balance, and there were no recorded falls/moments of LOB. Pt returned to fowlers position with CGA and ended session. Pt will benefit from skilled PT intervention to improve independence with functional tasks and ambulation as well as improve pain. Anticipate d.c to home with assist when ready.     Time Calculation:    PT Charges     Row Name 08/06/22 1318             Time Calculation    Start Time 1318  -SCOTT (r) DEIDRE (t) SCOTT (c)      Stop Time 1349  -SCOTT (r) DEIDRE (t) SCOTT (c)      Time Calculation (min) 31 min  -SCOTT (r) DEIDRE (t)      PT Received On 08/06/22  -SCOTT (r) DEIDRE (t) SCOTT (c)      PT Goal Re-Cert Due Date 08/16/22  -SCOTT (r) DEIDRE (t) SCOTT (c)            User Key  (r) =  Recorded By, (t) = Taken By, (c) = Cosigned By    Initials Name Provider Type    Luiz Lakhani, PT DPT Physical Therapist    Nathan Ramirez, PT Student PT Student                  PT G-Codes  Outcome Measure Options: AM-PAC 6 Clicks Basic Mobility (PT)  AM-PAC 6 Clicks Score (PT): 19  AM-PAC 6 Clicks Score (OT): 19    Nathan Pandya PT Student  8/6/2022

## 2022-08-06 NOTE — THERAPY TREATMENT NOTE
Acute Care - Occupational Therapy Treatment Note  [facility]     Patient Name: Erick Luong  : 1955  MRN: [id number]  Today's Date: 2022  Onset of Illness/Injury or Date of Surgery: 22  Date of Referral to OT: 22  Referring Physician: Dr. Soliz    Admit Date: 2022       ICD-10-CM ICD-9-CM   1. Cervical radiculopathy  M54.12 723.4   2. Decreased activities of daily living (ADL)  Z78.9 V49.89     Patient Active Problem List   Diagnosis   • Obesity, unspecified obesity severity, unspecified obesity type   • Essential hypertension   • Type 2 diabetes mellitus with hyperglycemia, with long-term current use of insulin ([facility])   • Nonsmoker   • Anemia due to chronic kidney disease   • Class 3 severe obesity due to excess calories with body mass index (BMI) of 45.0 to 49.9 in adult ([facility])   • Anasarca   • Sleep apnea with use of continuous positive airway pressure (CPAP)   • CKD (chronic kidney disease) stage 4, GFR 15-29 ml/min ([facility])   • Medically noncompliant   • Diabetic foot ulcer ([facility])   • Diabetic polyneuropathy associated with type 2 diabetes mellitus ([facility])   • Spinal stenosis in cervical region   • Degeneration of cervical intervertebral disc   • Cervical radiculopathy   • Degeneration of lumbar or lumbosacral intervertebral disc   • Cervical myelopathy (HCC)     Past Medical History:   Diagnosis Date   • Arthritis    • Autonomic disease    • CAD (coronary artery disease) 2017   • Cervical radiculopathy 2021   • Chronic constipation with acute exaccerbation 05/10/2021   • Coronary artery disease    • Degeneration of cervical intervertebral disc 2021   • Diabetes mellitus (HCC)    • Diabetic foot ulcer (HCC) 2020   • Diabetic polyneuropathy associated with type 2 diabetes mellitus (HCC) 2021   • Elevated cholesterol    • Gastroesophageal reflux disease 2019   • HTN (hypertension), benign 2017   • Hyperlipidemia    • Hypertension    • Mixed  hyperlipidemia 02/07/2017   • Multiple lung nodules 01/26/2020    5mm, 9 mm RLL identified 1/2020, not present 10/2019.   • Myocardial infarction (HCC)    • Osteomyelitis (HCC) 01/22/2020   • Osteomyelitis of fifth toe of right foot (HCC) 10/07/2019   • Pancreatitis    • Persistent insomnia 01/20/2020   • Renal disorder    • Sleep apnea 02/06/2017   • Sleep apnea with use of continuous positive airway pressure (CPAP)     NON-COMPLIANT   • Slow transit constipation 01/16/2019   • Spinal stenosis in cervical region 09/16/2021   • Vitamin D deficiency 03/02/2021     Past Surgical History:   Procedure Laterality Date   • ABDOMINAL SURGERY     • AMPUTATION FOOT / TOE Left 10/2021    5th digit    • APPENDECTOMY     • BACK SURGERY     • CARDIAC CATHETERIZATION Left 02/08/2021    Procedure: Left Heart Cath w poss intervention left anatomical snuff box acess;  Surgeon: Omkar Charles DO;  Location:  [facility];  Service: Cardiology;  Laterality: Left;   • CARDIAC SURGERY     • CATARACT EXTRACTION     • CERVICAL SPINE SURGERY     • COLONOSCOPY N/A 01/31/2017    Normal exam repeat in 5 years   • COLONOSCOPY N/A 02/11/2019    Mild acute inflammation   • COLONOSCOPY W/ POLYPECTOMY  03/04/2014    Hyperplastic polyp   • CORONARY ARTERY BYPASS GRAFT  10/2015   • ENDOSCOPY  04/13/2011    Gastritis with hemorrhage   • ENDOSCOPY N/A 05/05/2017    Normal exam   • ENDOSCOPY N/A 02/11/2019    Gastritis   • ENDOSCOPY N/A 09/01/2020    Non-erosive gastritis with hemorrhage   • ENDOSCOPY N/A 02/10/2021    Esophagitis   • FOOT SURGERY Left    • INCISION AND DRAINAGE OF WOUND Left 09/2007    spider bite         OT ASSESSMENT FLOWSHEET (last 12 hours)     OT Evaluation and Treatment     Row Name 08/06/22 1408                   OT Time and Intention    Subjective Information complains of  -TS        Document Type therapy note (daily note)  -TS        Mode of Treatment occupational therapy  -TS        Patient Effort  good  -TS                  General Information    Existing Precautions/Restrictions fall;cervical collar;brace on at all times;spinal  -TS                  Pain Assessment    Pretreatment Pain Rating 5/10  -TS        Posttreatment Pain Rating 7/10  -TS        Pain Location - neck;head  -TS        Pain Intervention(s) Repositioned  -TS                  Activities of Daily Living    BADL Assessment/Intervention upper body dressing  -TS                  Upper Body Dressing Assessment/Training    [place] Level (Upper Body Dressing) don;doff;verbal cues;maximum assist (25% patient effort)  -TS        Position (Upper Body Dressing) edge of bed sitting  -TS        Comment, (Upper Body Dressing) aspen collar  -TS                  Bed Mobility    Supine-Sit [place] (Bed Mobility) modified independence  -TS        Comment, (Bed Mobility) Pt states he sleeps in recliner at home  -TS                  Functional Mobility    Functional Mobility- Ind. Level independent  -TS                  Transfers    Sit-Stand [place] (Transfers) independent  -TS        Stand-Sit [place] (Transfers) independent  -TS                  Orthotics & Prosthetics Management    Orthosis Location spinal orthosis  -TS        Additional Documentation Orthosis Location (Row)  -TS                  Spinal Orthosis Management    Type (Spinal Orthosis) cervical collar, hard  -TS        Wearing Schedule (Spinal Orthosis) wear full-time;remove for hygiene/bathing  -TS        Orthosis Training (Spinal Orthosis) patient;cleaning/care of orthosis;donning/doffing orthosis;orthosis adjustment;wearing schedule  -TS        Adjustment Needed/Outcome (Spinal Orthosis) other (see comments)  pt chin occasionally slips from chin support, pt encouraged to report to MD if continues to happen and speak with MD about possible better fit of [place] J collar as opposed to aspen  -TS                  Wound 08/05/22 1131 anterior neck Incision    Wound -  Properties Group Placement Date: 08/05/22  -JACIEL Placement Time: 1131 -JB Orientation: anterior  -JACIEL Location: neck  -JACIEL Primary Wound Type: Incision  -JACIEL        Retired Wound - Properties Group Placement Date: 08/05/22  -JACIEL Placement Time: 1131 -JACIEL Orientation: anterior  -JACIEL Location: neck  -JACIEL Primary Wound Type: Incision  -JACIEL        Retired Wound - Properties Group Date first assessed: 08/05/22  -JACIEL Time first assessed: 1131 -JACIEL Location: neck  -JACIEL Primary Wound Type: Incision  -JACIEL                  Plan of Care Review    Plan of Care Reviewed With patient  -TS        Progress improving  -TS                  Positioning and Restraints    Pre-Treatment Position in bed  -TS        Post Treatment Position chair  -TS        In Chair reclined;call light within reach;encouraged to call for assist;with brace  -TS              User Key  (r) = Recorded By, (t) = Taken By, (c) = Cosigned By    Initials Name Effective Dates    TS Neelam Arevalo, SYLVIE 06/16/21 -     JACIEL Tj Lux, RN 02/17/22 -                        OT Recommendation and Plan     Plan of Care Review  Plan of Care Reviewed With: patient  Progress: improving  Plan of Care Reviewed With: patient     Outcome Measures     Row Name 08/06/22 1400 08/05/22 1430          How much help from another is currently needed...    Putting on and taking off regular lower body clothing? 2  -TS 2  -AC     Bathing (including washing, rinsing, and drying) 3  -TS 3  -AC     Toileting (which includes using toilet bed pan or urinal) 3  -TS 3  -AC     Putting on and taking off regular upper body clothing 3  -TS 3  -AC     Taking care of personal grooming (such as brushing teeth) 4  -TS 4  -AC     Eating meals 4  -TS 4  -AC     AM-PAC 6 Clicks Score (OT) 19  -TS 19  -AC            Functional Assessment    Outcome Measure Options -- AM-PAC 6 Clicks Daily Activity (OT)  -AC           User Key  (r) = Recorded By, (t) = Taken By, (c) = Cosigned By    Initials Name Provider Type     AC Ezekiel Blake, OTR/L, CNT Occupational Therapist    TS Neelam Arevalo COTA Occupational Therapist Assistant                Time Calculation:    Time Calculation- OT     Row Name 08/06/22 1454             Time Calculation- OT    OT Start Time 1408  -TS      OT Stop Time 1447  -TS      OT Time Calculation (min) 39 min  -TS      Total Timed Code Minutes- OT 39 minute(s)  -TS      OT Received On 08/06/22  -TS              Timed Charges    74238 - OT Self Care/Mgmt Minutes 39  -TS              Total Minutes    Timed Charges Total Minutes 39  -TS       Total Minutes 39  -TS            User Key  (r) = Recorded By, (t) = Taken By, (c) = Cosigned By    Initials Name Provider Type    TS Neelam Arevalo COTA Occupational Therapist Assistant              Therapy Charges for Today     Code Description Service Date Service Provider Modifiers Qty    58304033718 HC OT SELF CARE/MGMT/TRAIN EA 15 MIN 8/6/2022 Neelam Arevalo COTA GO 3               Neelam MCCARTNEY. SYLVIE Arevalo  8/6/2022

## 2022-08-07 NOTE — THERAPY DISCHARGE NOTE
Acute Care - Occupational Therapy Discharge Summary  [facility]     Patient Name: Erick Luong  : 1955  MRN: [id number]    Today's Date: 2022  Onset of Illness/Injury or Date of Surgery: 22    Date of Referral to OT: 22  Referring Physician: Dr. Soliz      Admit Date: 2022        OT Recommendation and Plan    Visit Dx:    ICD-10-CM ICD-9-CM   1. Cervical myelopathy (HCC)  G95.9 721.1   2. Cervical radiculopathy  M54.12 723.4   3. Decreased activities of daily living (ADL)  Z78.9 V49.89   4. Degeneration of lumbar or lumbosacral intervertebral disc  M51.37 722.52   5. Degeneration of cervical intervertebral disc  M50.30 722.4                OT Rehab Goals     Row Name 22 0702             Transfer Goal 1 (OT)    Activity/Assistive Device (Transfer Goal 1, OT) tub  -TS      [place] Level/Cues Needed (Transfer Goal 1, OT) standby assist  -TS      Time Frame (Transfer Goal 1, OT) long term goal (LTG);10 days  -TS      Progress/Outcome (Transfer Goal 1, OT) goal not met  -TS              Dressing Goal 1 (OT)    Activity/Device (Dressing Goal 1, OT) dressing skills, all  -TS      [place]/Cues Needed (Dressing Goal 1, OT) minimum assist (75% or more patient effort)  -TS      Time Frame (Dressing Goal 1, OT) long term goal (LTG);10 days  -TS      Progress/Outcome (Dressing Goal 1, OT) goal not met  -TS            User Key  (r) = Recorded By, (t) = Taken By, (c) = Cosigned By    Initials Name Provider Type Discipline    TS Neelam Arevalo COTA Occupational Therapist Assistant OT                 Outcome Measures     Row Name 22 1400 22 1430          How much help from another is currently needed...    Putting on and taking off regular lower body clothing? 2  -TS 2  -AC     Bathing (including washing, rinsing, and drying) 3  -TS 3  -AC     Toileting (which includes using toilet bed pan or urinal) 3  -TS 3  -AC     Putting on and taking off regular upper body  clothing 3  -TS 3  -AC     Taking care of personal grooming (such as brushing teeth) 4  -TS 4  -AC     Eating meals 4  -TS 4  -AC     AM-PAC 6 Clicks Score (OT) 19  -TS 19  -AC            Functional Assessment    Outcome Measure Options -- AM-PAC 6 Clicks Daily Activity (OT)  -AC           User Key  (r) = Recorded By, (t) = Taken By, (c) = Cosigned By    Initials Name Provider Type    AC Ezekiel Blake, OTR/L, CNT Occupational Therapist    Neelam Gayle COTA Occupational Therapist Assistant                Timed Therapy Charges  Total Units: 3    Charges  Total Units: 3    Procedure Name Documented Minutes Units Code    HC OT SELF CARE/MGMT/TRAIN EA 15 MIN 39  3    76773 (CPT®)               Documented Minutes  Total Minutes: 39    Therapy Provided Minutes    58074 - OT Self Care/Mgmt Minutes 39                Therapy Charges for Today     Code Description Service Date Service Provider Modifiers Qty    01844058154 HC OT SELF CARE/MGMT/TRAIN EA 15 MIN 8/6/2022 Neelam Arevalo COTA GO 3          OT Discharge Summary  Anticipated Discharge Disposition (OT): home with assist  Reason for Discharge: Discharge from facility  Outcomes Achieved: Refer to plan of care for updates on goals achieved  Discharge Destination: Home with assist      SYLVIE Seals  8/7/2022

## 2022-08-07 NOTE — THERAPY DISCHARGE NOTE
Acute Care - Physical Therapy Discharge Summary  [facility]       Patient Name: Erick Luong  : 1955  MRN: [id number]    Today's Date: 2022  Onset of Illness/Injury or Date of Surgery: 22       Referring Physician: Dr. Soliz      Admit Date: 2022      PT Recommendation and Plan    Visit Dx:    ICD-10-CM ICD-9-CM   1. Cervical myelopathy (HCC)  G95.9 721.1   2. Cervical radiculopathy  M54.12 723.4   3. Decreased activities of daily living (ADL)  Z78.9 V49.89   4. Degeneration of lumbar or lumbosacral intervertebral disc  M51.37 722.52   5. Degeneration of cervical intervertebral disc  M50.30 722.4        Outcome Measures     Row Name 22 1400 22 1430          How much help from another is currently needed...    Putting on and taking off regular lower body clothing? 2  -TS 2  -AC     Bathing (including washing, rinsing, and drying) 3  -TS 3  -AC     Toileting (which includes using toilet bed pan or urinal) 3  -TS 3  -AC     Putting on and taking off regular upper body clothing 3  -TS 3  -AC     Taking care of personal grooming (such as brushing teeth) 4  -TS 4  -AC     Eating meals 4  -TS 4  -AC     AM-PAC 6 Clicks Score (OT) 19  -TS 19  -AC            Functional Assessment    Outcome Measure Options -- AM-PAC 6 Clicks Daily Activity (OT)  -AC           User Key  (r) = Recorded By, (t) = Taken By, (c) = Cosigned By    Initials Name Provider Type    AC Ezekiel Blake, OTR/L, CNT Occupational Therapist    TS Neelam Arevalo COTA Occupational Therapist Assistant                     PT Rehab Goals     Row Name 22 0702             Bed Mobility Goal 1 (PT)    Activity/Assistive Device (Bed Mobility Goal 1, PT) sit to supine;supine to sit  -AH      [place] Level/Cues Needed (Bed Mobility Goal 1, PT) independent  -AH      Time Frame (Bed Mobility Goal 1, PT) long term goal (LTG);10 days  -      Progress/Outcomes (Bed Mobility Goal 1, PT) goal not met  -               Transfer Goal 1 (PT)    Activity/Assistive Device (Transfer Goal 1, PT) sit-to-stand/stand-to-sit;bed-to-chair/chair-to-bed  -      [place] Level/Cues Needed (Transfer Goal 1, PT) independent  -AH      Time Frame (Transfer Goal 1, PT) long term goal (LTG);10 days  -AH      Progress/Outcome (Transfer Goal 1, PT) goal not met  -AH              Gait Training Goal 1 (PT)    Activity/Assistive Device (Gait Training Goal 1, PT) gait (walking locomotion);decrease fall risk;improve balance and speed;increase endurance/gait distance  -AH      [place] Level (Gait Training Goal 1, PT) independent  -AH      Distance (Gait Training Goal 1, PT) 400 ft  -      Time Frame (Gait Training Goal 1, PT) long term goal (LTG);10 days  -AH      Progress/Outcome (Gait Training Goal 1, PT) goal not met  -            User Key  (r) = Recorded By, (t) = Taken By, (c) = Cosigned By    Initials Name Provider Type [facility]    Chery Cedeno PTA Physical Therapist Assistant PT                    PT Discharge Summary  Reason for Discharge: Discharge from facility  Outcomes Achieved: Discharge from facility occurred on same date as evluation  Discharge Destination: Home with assist      Chery Rosado PTA   8/7/2022

## 2022-08-08 ENCOUNTER — TELEPHONE (OUTPATIENT)
Dept: NEUROSURGERY | Facility: CLINIC | Age: 67
End: 2022-08-08

## 2022-08-08 NOTE — TELEPHONE ENCOUNTER
Patient's wife left a VM on Friday stating Dr Wood and Willy were trying to find out the medication the patient had been given by Dr Gomes and she wanted me to call her back.  However, I was not in the office on Friday so I called her this morning and she said it had been taken care of & he was home.    GAVIOTA ESPINOZA [facility]  PHYSICIAN LEAD  DR GABRIELLE WOOD  [facility] NEUROSURGERY

## 2022-08-09 LAB
CYTO UR: NORMAL
LAB AP CASE REPORT: NORMAL
Lab: NORMAL
PATH REPORT.FINAL DX SPEC: NORMAL
PATH REPORT.GROSS SPEC: NORMAL

## 2022-08-09 NOTE — DISCHARGE SUMMARY
Date of Discharge:  8/9/2022    Discharge Diagnosis: Cervical myelopathy    Presenting Problem/History of Present Illness  Cervical radiculopathy [M54.12]  Cervical myelopathy (HCC) [G95.9]       Hospital Course  Patient is a 67 y.o. male presented with cervical myelopathy.  He went to the operating room for a single level anterior cervical fusion at C5-6.  Postoperatively he did well.  His neck pain and upper extremity symptoms had improved.  He was ambulating without assistance at his baseline, tolerating all p.o., voiding spontaneously.  At this point is felt that he can be discharged home..      Procedures Performed  Procedure(s):  CERVICAL DISCECTOMY ANTERIOR WITH FUSION C5-6 with possible plating of C5-7 with neuromonitoring and 1 c-arm       Consults:   Consults     No orders found from 7/7/2022 to 8/6/2022.          Pertinent Test Results: radiology: X-Ray: Cervical spine    Condition on Discharge: Stable    Vital Signs       Physical Exam:   Physical Exam  Eyes:      Extraocular Movements: EOM normal.      Pupils: Pupils are equal, round, and reactive to light.   Neurological:      Mental Status: He is oriented to person, place, and time.      Coordination: Finger-Nose-Finger Test normal.      Gait: Gait is intact.      Deep Tendon Reflexes: Strength normal.   Psychiatric:         Speech: Speech normal.          Neurologic Exam     Mental Status   Oriented to person, place, and time.   Attention: normal.   Speech: speech is normal   Level of consciousness: alert  Knowledge: good.     Cranial Nerves     CN II   Visual fields full to confrontation.     CN III, IV, VI   Pupils are equal, round, and reactive to light.  Extraocular motions are normal.     CN V   Facial sensation intact.     CN VII   Facial expression full, symmetric.     CN VIII   CN VIII normal.     CN IX, X   CN IX normal.   CN X normal.     CN XI   CN XI normal.     CN XII   CN XII normal.     Motor Exam   Muscle bulk: normal  Overall  muscle tone: normal  Right arm pronator drift: absent  Left arm pronator drift: absent    Strength   Strength 5/5 throughout.     Sensory Exam   Light touch normal.   Pinprick normal.     Gait, Coordination, and Reflexes     Gait  Gait: normal    Coordination   Finger to nose coordination: normal    Tremor   Resting tremor: absent  Intention tremor: absent  Action tremor: absent    Reflexes   Reflexes 2+ except as noted.          Discharge Disposition  Home or Self Care    Discharge Medications     Discharge Medications      New Medications      Instructions Start Date   oxyCODONE-acetaminophen 7.5-325 MG per tablet  Commonly known as: Percocet   1 tablet, Oral, Every 4 Hours PRN         Changes to Medications      Instructions Start Date   cyclobenzaprine 5 MG tablet  Commonly known as: FLEXERIL  What changed: Another medication with the same name was added. Make sure you understand how and when to take each.   Take 1 tablet by mouth 2 (Two) Times a Day As Needed for muscle spasms      cyclobenzaprine 5 MG tablet  Commonly known as: FLEXERIL  What changed: Another medication with the same name was added. Make sure you understand how and when to take each.   Take 1 tablet by mouth once daily in the morning and 2 tablets at bedtime.      cyclobenzaprine 5 MG tablet  Commonly known as: FLEXERIL  What changed: Another medication with the same name was added. Make sure you understand how and when to take each.   Take 1 tablet by mouth every morning, then take 2 tablets by mouth at bedtime.      cyclobenzaprine 10 MG tablet  Commonly known as: FLEXERIL  What changed: You were already taking a medication with the same name, and this prescription was added. Make sure you understand how and when to take each.   10 mg, Oral, 3 Times Daily PRN      cyclobenzaprine 10 MG tablet  Commonly known as: FLEXERIL  What changed: You were already taking a medication with the same name, and this prescription was added. Make sure you  understand how and when to take each.   10 mg, Oral, 3 Times Daily PRN      NovoLOG FlexPen 100 UNIT/ML solution pen-injector sc pen  Generic drug: insulin aspart  What changed:   · how much to take  · when to take this  · additional instructions   Inject up to 150 units subcutaneously twice daily according to sliding scale instructions from provider.         Continue These Medications      Instructions Start Date   ascorbic acid 1000 MG tablet  Commonly known as: VITAMIN C   1,000 mg, Oral, Daily      aspirin 81 MG EC tablet   81 mg, Oral, Daily      bumetanide 1 MG tablet  Commonly known as: BUMEX   1 mg, Oral, 2 Times Daily      busPIRone 10 MG tablet  Commonly known as: BUSPAR   Take 1 tablet 3 times a day by mouth as needed.      busPIRone 10 MG tablet  Commonly known as: BUSPAR   10 mg, Oral, 3 Times Daily PRN      busPIRone 10 MG tablet  Commonly known as: BUSPAR   10 mg, Oral, 3 Times Daily PRN      carvedilol 6.25 MG tablet  Commonly known as: COREG   6.25 mg, Oral, 2 Times Daily      donepezil 10 MG tablet  Commonly known as: ARICEPT   10 mg, Oral, Daily      donepezil 10 MG tablet  Commonly known as: ARICEPT   10 mg, Oral, Daily      DULoxetine 60 MG capsule  Commonly known as: CYMBALTA   60 mg, Oral, Daily      HumuLIN R U-500 KwikPen 500 UNIT/ML solution pen-injector CONCENTRATED injection  Generic drug: Insulin Regular Human (Conc)   Inject 120 Units under the skin into the appropriate area as directed 2 (Two) Times a Day with breakfast and dinner.      HYDROcodone-acetaminophen 7.5-325 MG per tablet  Commonly known as: NORCO   Take 1 tablet by mouth 2 (Two) Times a Day as needed      irbesartan 150 MG tablet  Commonly known as: AVAPRO   150 mg, Oral, Daily      Finerenone 10 MG tablet   Take 1 tablet by mouth every day      Kerendia 10 MG tablet  Generic drug: Finerenone   10 mg, Oral, Daily      pantoprazole 40 MG EC tablet  Commonly known as: PROTONIX   Take 1 tablet by mouth twice a day.       prazosin 2 MG capsule  Commonly known as: MINIPRESS   2 mg, Oral, Every Night at Bedtime      pregabalin 50 MG capsule  Commonly known as: LYRICA   50 mg, Oral, 3 Times Daily      pregabalin 50 MG capsule  Commonly known as: LYRICA   50 mg, Oral, 3 Times Daily      rosuvastatin 40 MG tablet  Commonly known as: CRESTOR   40 mg, Oral, Nightly      tamsulosin 0.4 MG capsule 24 hr capsule  Commonly known as: FLOMAX   0.4 mg, Oral, Daily      TechLite Pen Needles 31G X 5 MM misc  Generic drug: Insulin Pen Needle   Use three times a day.      traZODone 100 MG tablet  Commonly known as: DESYREL   100 mg, Oral, Nightly      traZODone 100 MG tablet  Commonly known as: DESYREL   100 mg, Oral, Nightly PRN      Trulicity 1.5 MG/0.5ML solution pen-injector  Generic drug: Dulaglutide   1.5 mg, Subcutaneous, Weekly      vitamin D3 125 MCG (5000 UT) tablet tablet   Take 1 tablet by mouth Daily with meal      Zinc Sulfate 220 (50 Zn) MG tablet   1 tablet, Oral, Daily             Discharge Diet:   Diet Instructions     Diet: Regular; Thin      Discharge Diet: Regular    Fluid Consistency: Thin          Activity at Discharge:   Activity Instructions     Other Instructions (Specify)      Activity Instructions: No strenuous activity, no driving, wear cervical collar at all times          Follow-up Appointments  Future Appointments   Date Time Provider Department Center   8/3/2023  9:00 AM Emeka Garay MD [facility]     Additional Instructions for the Follow-ups that You Need to Schedule     Call MD With Problems / Concerns   As directed      Instructions: Worsening neck or arm pain, drainage from wound, fever, difficulty breathing or swallowing.    Order Comments: Instructions: Worsening neck or arm pain, drainage from wound, fever, difficulty breathing or swallowing.          Discharge Follow-up with Specialty: ramonita padilla np; 3 Weeks   As directed      Specialty: ramonita padilla np    Follow Up: 3 Weeks                Test Results Pending at Discharge       Karel Soliz MD  08/09/22  15:46 CDT    Time: Discharge 45 min

## 2022-08-11 DIAGNOSIS — M48.02 SPINAL STENOSIS IN CERVICAL REGION: Primary | ICD-10-CM

## 2022-08-12 ENCOUNTER — OFFICE VISIT (OUTPATIENT)
Dept: NEUROSURGERY | Facility: CLINIC | Age: 67
End: 2022-08-12

## 2022-08-12 ENCOUNTER — TRANSCRIBE ORDERS (OUTPATIENT)
Dept: ADMINISTRATIVE | Facility: HOSPITAL | Age: 67
End: 2022-08-12

## 2022-08-12 ENCOUNTER — HOSPITAL ENCOUNTER (OUTPATIENT)
Dept: GENERAL RADIOLOGY | Facility: HOSPITAL | Age: 67
Discharge: HOME | End: 2022-08-12
Admitting: NURSE PRACTITIONER

## 2022-08-12 VITALS — BODY MASS INDEX: 45.1 KG/M2 | WEIGHT: 315 LBS | HEIGHT: 70 IN

## 2022-08-12 DIAGNOSIS — E11.21 TYPE 2 DIABETES MELLITUS WITH DIABETIC NEPHROPATHY, UNSPECIFIED WHETHER LONG TERM INSULIN USE: Primary | ICD-10-CM

## 2022-08-12 DIAGNOSIS — N18.4 STAGE 4 CHRONIC KIDNEY DISEASE: ICD-10-CM

## 2022-08-12 DIAGNOSIS — M48.02 SPINAL STENOSIS IN CERVICAL REGION: ICD-10-CM

## 2022-08-12 DIAGNOSIS — N40.0 BENIGN PROSTATIC HYPERPLASIA WITHOUT LOWER URINARY TRACT SYMPTOMS: ICD-10-CM

## 2022-08-12 DIAGNOSIS — G95.9 CERVICAL MYELOPATHY: Primary | ICD-10-CM

## 2022-08-12 DIAGNOSIS — E66.01 CLASS 3 SEVERE OBESITY DUE TO EXCESS CALORIES WITHOUT SERIOUS COMORBIDITY WITH BODY MASS INDEX (BMI) OF 45.0 TO 49.9 IN ADULT: ICD-10-CM

## 2022-08-12 DIAGNOSIS — I25.10 ATHEROSCLEROSIS OF NATIVE CORONARY ARTERY WITHOUT ANGINA PECTORIS, UNSPECIFIED WHETHER NATIVE OR TRANSPLANTED HEART: ICD-10-CM

## 2022-08-12 DIAGNOSIS — E55.9 VITAMIN D DEFICIENCY: ICD-10-CM

## 2022-08-12 DIAGNOSIS — M48.02 SPINAL STENOSIS IN CERVICAL REGION: Primary | ICD-10-CM

## 2022-08-12 DIAGNOSIS — I10 ESSENTIAL (PRIMARY) HYPERTENSION: ICD-10-CM

## 2022-08-12 PROCEDURE — 99024 POSTOP FOLLOW-UP VISIT: CPT | Performed by: NURSE PRACTITIONER

## 2022-08-12 PROCEDURE — 72040 X-RAY EXAM NECK SPINE 2-3 VW: CPT

## 2022-08-12 RX ORDER — ROSUVASTATIN CALCIUM 40 MG/1
40 TABLET, COATED ORAL NIGHTLY
Status: ON HOLD | COMMUNITY
End: 2023-01-20

## 2022-08-12 RX ORDER — METHYLPREDNISOLONE 4 MG/1
TABLET ORAL
Qty: 21 EACH | Refills: 0 | Status: ON HOLD | OUTPATIENT
Start: 2022-08-12 | End: 2023-01-20

## 2022-08-12 RX ORDER — BUMETANIDE 1 MG/1
1 TABLET ORAL 2 TIMES DAILY
COMMUNITY
End: 2022-08-12

## 2022-08-12 NOTE — PROGRESS NOTES
"  Chief complaint:   Chief Complaint   Patient presents with   • Post-op     Pt here for P/O wound check. Pt states he is having some neck pain today.        Subjective     HPI: This is a 67 y.o. male patient who went to the operating room on 8/5/2022 for a C5-6 ACDF.  The patient has had a previous cervical fusion at C6-7 by Dr. Diana and did well from that surgery.  Prior to the surgery the patient was complaining of neck pain with bilateral upper extremity pain with the left being worse than the right.  The patient is here in follow up today for postoperative visit.  He said that he has had improvement in his left upper extremity.  He does not have any pain anymore.  He does have some numbness and tingling in his hands.  He does continue to complain of some back pain.  His biggest complaint is difficulty with swallowing at this time.  We did send the patient for x-rays and he is here in follow-up today.  He said that yesterday he was able to eat some food but it is difficult for him to get the food down along with his pills.        Review of Systems   HENT: Positive for trouble swallowing.    Musculoskeletal: Positive for neck pain.   Neurological: Positive for numbness.         Objective      Vital Signs  Ht 179 cm (70.47\")   Wt (!) 158 kg (348 lb)   BMI 49.27 kg/m²     Physical Exam  Constitutional:       Appearance: He is well-developed.   HENT:      Head: Normocephalic.   Eyes:      Pupils: Pupils are equal, round, and reactive to light.   Pulmonary:      Effort: Pulmonary effort is normal.   Musculoskeletal:         General: Normal range of motion.      Cervical back: Normal range of motion.      Comments: Neck pain   Skin:     General: Skin is warm.   Neurological:      Mental Status: He is alert and oriented to person, place, and time.      GCS: GCS eye subscore is 4. GCS verbal subscore is 5. GCS motor subscore is 6.      Cranial Nerves: No cranial nerve deficit.      Sensory: No sensory deficit.      " Gait: Gait normal.      Deep Tendon Reflexes: Reflexes are normal and symmetric.   Psychiatric:         Speech: Speech normal.         Behavior: Behavior normal.         Thought Content: Thought content normal.       Incisions clean dry and intact    Neurologic Exam     Mental Status   Oriented to person, place, and time.   Speech: speech is normal     Cranial Nerves     CN III, IV, VI   Pupils are equal, round, and reactive to light.      Imaging review: X-ray of the cervical spine show stable appearance of the hardware.  There does appear to be a significant amount of prevertebral swelling              Assessment/Plan: Patient does have a significant mount of prevertebral swelling.  I am going to start him on a Medrol Dosepak to see this will help with the swelling.  They also did express that his blood sugars have been elevated.  I did explain that he needs to get in touch with his primary care doctor to see if they want to make any changes to his diabetes management especially while redoing the Medrol Dosepak due to it causing his blood sugars to become even more elevated.  They nausea understanding and will get in touch with her PCP for further evaluation and recommendation    Patient is a nonsmoker  The patient's Body mass index is 49.27 kg/m².. BMI is above normal parameters. Recommendations include: educational material and nutrition counseling    Diagnoses and all orders for this visit:    1. Cervical myelopathy (HCC) (Primary)    2. Class 3 severe obesity due to excess calories without serious comorbidity with body mass index (BMI) of 45.0 to 49.9 in adult (HCC)    Other orders  -     methylPREDNISolone (MEDROL) 4 MG dose pack; Take as directed on package instructions.  Dispense: 21 each; Refill: 0        I discussed the patients findings and my recommendations with patient  Willy Romero, SUSAN  08/12/22  10:23 CDT

## 2022-08-12 NOTE — PATIENT INSTRUCTIONS
"BMI for Adults  What is BMI?  Body mass index (BMI) is a number that is calculated from a person's weight and height. BMI can help estimate how much of a person's weight is composed of fat. BMI does not measure body fat directly. Rather, it is an alternative to procedures that directly measure body fat, which can be difficult and expensive.  BMI can help identify people who may be at higher risk for certain medical problems.  What are BMI measurements used for?  BMI is used as a screening tool to identify possible weight problems. It helps determine whether a person is obese, overweight, a healthy weight, or underweight.  BMI is useful for:  Identifying a weight problem that may be related to a medical condition or may increase the risk for medical problems.  Promoting changes, such as changes in diet and exercise, to help reach a healthy weight. BMI screening can be repeated to see if these changes are working.  How is BMI calculated?  BMI involves measuring your weight in relation to your height. Both height and weight are measured, and the BMI is calculated from those numbers. This can be done either in English (U[place] or metric measurements. Note that charts and online BMI calculators are available to help you find your BMI quickly and easily without having to do these calculations yourself.  To calculate your BMI in English ([place] measurements:    Measure your weight in pounds (lb).  Multiply the number of pounds by 703.  For example, for a person who weighs 180 lb, multiply that number by 703, which equals 126,540.  Measure your height in inches. Then multiply that number by itself to get a measurement called \"inches squared.\"  For example, for a person who is 70 inches tall, the \"inches squared\" measurement is 70 inches x 70 inches, which equals 4,900 inches squared.  Divide the total from step 2 (number of lb x 703) by the total from step 3 (inches squared): 126,540 ÷ 4,900 = 25.8. This is your BMI.    To " "calculate your BMI in metric measurements:  Measure your weight in kilograms (kg).  Measure your height in meters (m). Then multiply that number by itself to get a measurement called \"meters squared.\"  For example, for a person who is 1.75 m tall, the \"meters squared\" measurement is 1.75 m x 1.75 m, which is equal to 3.1 meters squared.  Divide the number of kilograms (your weight) by the meters squared number. In this example: 70 ÷ 3.1 = 22.6. This is your BMI.  What do the results mean?  BMI charts are used to identify whether you are underweight, normal weight, overweight, or obese. The following guidelines will be used:  Underweight: BMI less than 18.5.  Normal weight: BMI between 18.5 and 24.9.  Overweight: BMI between 25 and 29.9.  Obese: BMI of 30 or above.  Keep these notes in mind:  Weight includes both fat and muscle, so someone with a muscular build, such as an athlete, may have a BMI that is higher than 24.9. In cases like these, BMI is not an accurate measure of body fat.  To determine if excess body fat is the cause of a BMI of 25 or higher, further assessments may need to be done by a health care provider.  BMI is usually interpreted in the same way for men and women.  Where to find more information  For more information about BMI, including tools to quickly calculate your BMI, go to these websites:  Centers for Disease Control and Prevention: www.cdc.gov  American Heart Association: www.heart.org  National Heart, Lung, and Blood [place]: www.nhlbi.nih.gov  Summary  Body mass index (BMI) is a number that is calculated from a person's weight and height.  BMI may help estimate how much of a person's weight is composed of fat. BMI can help identify those who may be at higher risk for certain medical problems.  BMI can be measured using English measurements or metric measurements.  BMI charts are used to identify whether you are underweight, normal weight, overweight, or obese.  This information is not " intended to replace advice given to you by your health care provider. Make sure you discuss any questions you have with your health care provider.  Document Revised: 09/09/2020 Document Reviewed: 07/17/2020  Elsevier Patient Education © 2021 [employer]

## 2022-08-25 ENCOUNTER — TRANSCRIBE ORDERS (OUTPATIENT)
Dept: ADMINISTRATIVE | Facility: HOSPITAL | Age: 67
End: 2022-08-25

## 2022-08-25 DIAGNOSIS — D50.0 IRON DEFICIENCY ANEMIA SECONDARY TO BLOOD LOSS (CHRONIC): ICD-10-CM

## 2022-08-25 DIAGNOSIS — E11.42 TYPE 2 DIABETES MELLITUS WITH DIABETIC POLYNEUROPATHY, UNSPECIFIED WHETHER LONG TERM INSULIN USE: Primary | ICD-10-CM

## 2022-08-25 DIAGNOSIS — N18.4 CHRONIC KIDNEY DISEASE, STAGE IV (SEVERE): ICD-10-CM

## 2022-08-25 DIAGNOSIS — N42.9 BENIGN PROSTATIC DISEASE: ICD-10-CM

## 2022-08-25 DIAGNOSIS — E78.5 HYPERLIPIDEMIA, UNSPECIFIED HYPERLIPIDEMIA TYPE: ICD-10-CM

## 2022-08-29 ENCOUNTER — OFFICE VISIT (OUTPATIENT)
Dept: NEUROSURGERY | Facility: CLINIC | Age: 67
End: 2022-08-29

## 2022-08-29 ENCOUNTER — HOSPITAL ENCOUNTER (OUTPATIENT)
Dept: GENERAL RADIOLOGY | Facility: HOSPITAL | Age: 67
Discharge: HOME | End: 2022-08-29
Admitting: NURSE PRACTITIONER

## 2022-08-29 VITALS — WEIGHT: 315 LBS | HEIGHT: 70 IN | BODY MASS INDEX: 45.1 KG/M2

## 2022-08-29 DIAGNOSIS — M48.02 SPINAL STENOSIS IN CERVICAL REGION: ICD-10-CM

## 2022-08-29 DIAGNOSIS — Z78.9 NONSMOKER: ICD-10-CM

## 2022-08-29 DIAGNOSIS — G95.9 CERVICAL MYELOPATHY: Primary | ICD-10-CM

## 2022-08-29 DIAGNOSIS — E66.01 CLASS 3 SEVERE OBESITY DUE TO EXCESS CALORIES WITH BODY MASS INDEX (BMI) OF 45.0 TO 49.9 IN ADULT, UNSPECIFIED WHETHER SERIOUS COMORBIDITY PRESENT: ICD-10-CM

## 2022-08-29 PROCEDURE — 72050 X-RAY EXAM NECK SPINE 4/5VWS: CPT

## 2022-08-29 PROCEDURE — 99024 POSTOP FOLLOW-UP VISIT: CPT | Performed by: NURSE PRACTITIONER

## 2022-08-29 NOTE — PATIENT INSTRUCTIONS
"https://www.nhlbi.nih.gov/files/docs/public/heart/dash_brief.pdf\">   DASH Eating Plan  DASH stands for Dietary Approaches to Stop Hypertension. The DASH eating plan is a healthy eating plan that has been shown to:  Reduce high blood pressure (hypertension).  Reduce your risk for type 2 diabetes, heart disease, and stroke.  Help with weight loss.  What are tips for following this plan?  Reading food labels  Check food labels for the amount of salt (sodium) per serving. Choose foods with less than 5 percent of the Daily Value of sodium. Generally, foods with less than 300 milligrams (mg) of sodium per serving fit into this eating plan.  To find whole grains, look for the word \"whole\" as the first word in the ingredient list.  Shopping  Buy products labeled as \"low-sodium\" or \"no salt added.\"  Buy fresh foods. Avoid canned foods and pre-made or frozen meals.  Cooking  Avoid adding salt when cooking. Use salt-free seasonings or herbs instead of table salt or sea salt. Check with your health care provider or pharmacist before using salt substitutes.  Do not vera foods. Cook foods using healthy methods such as baking, boiling, grilling, roasting, and broiling instead.  Cook with heart-healthy oils, such as olive, canola, avocado, soybean, or sunflower oil.  Meal planning    Eat a balanced diet that includes:  4 or more servings of fruits and 4 or more servings of vegetables each day. Try to fill one-half of your plate with fruits and vegetables.  6-8 servings of whole grains each day.  Less than 6 oz (170 g) of lean meat, poultry, or fish each day. A 3-oz (85-g) serving of meat is about the same size as a deck of cards. One egg equals 1 oz (28 g).  2-3 servings of low-fat dairy each day. One serving is 1 cup (237 mL).  1 serving of nuts, seeds, or beans 5 times each week.  2-3 servings of heart-healthy fats. Healthy fats called omega-3 fatty acids are found in foods such as walnuts, flaxseeds, fortified milks, and eggs. " These fats are also found in cold-water fish, such as sardines, salmon, and mackerel.  Limit how much you eat of:  Canned or prepackaged foods.  Food that is high in trans fat, such as some fried foods.  Food that is high in saturated fat, such as fatty meat.  Desserts and other sweets, sugary drinks, and other foods with added sugar.  Full-fat dairy products.  Do not salt foods before eating.  Do not eat more than 4 egg yolks a week.  Try to eat at least 2 vegetarian meals a week.  Eat more home-cooked food and less restaurant, buffet, and fast food.    Lifestyle  When eating at a restaurant, ask that your food be prepared with less salt or no salt, if possible.  If you drink alcohol:  Limit how much you use to:  0-1 drink a day for women who are not pregnant.  0-2 drinks a day for men.  Be aware of how much alcohol is in your drink. In the [place] one drink equals one 12 oz bottle of beer (355 mL), one 5 oz glass of wine (148 mL), or one 1½ oz glass of hard liquor (44 mL).  General information  Avoid eating more than 2,300 mg of salt a day. If you have hypertension, you may need to reduce your sodium intake to 1,500 mg a day.  Work with your health care provider to maintain a healthy body weight or to lose weight. Ask what an ideal weight is for you.  Get at least 30 minutes of exercise that causes your heart to beat faster (aerobic exercise) most days of the week. Activities may include walking, swimming, or biking.  Work with your health care provider or dietitian to adjust your eating plan to your individual calorie needs.  What foods should I eat?  Fruits  All fresh, dried, or frozen fruit. Canned fruit in natural juice (without added sugar).  Vegetables  Fresh or frozen vegetables (raw, steamed, roasted, or grilled). Low-sodium or reduced-sodium tomato and vegetable juice. Low-sodium or reduced-sodium tomato sauce and tomato paste. Low-sodium or reduced-sodium canned vegetables.  Grains  Whole-grain or  whole-wheat bread. Whole-grain or whole-wheat pasta. Brown rice. Oatmeal. Quinoa. Bulgur. Whole-grain and low-sodium cereals. Radha bread. Low-fat, low-sodium crackers. Whole-wheat flour tortillas.  Meats and other proteins  Skinless chicken or turkey. Ground chicken or turkey. Pork with fat trimmed off. Fish and seafood. Egg whites. Dried beans, peas, or lentils. Unsalted nuts, nut butters, and seeds. Unsalted canned beans. Lean cuts of beef with fat trimmed off. Low-sodium, lean precooked or cured meat, such as sausages or meat loaves.  Dairy  Low-fat (1%) or fat-free (skim) milk. Reduced-fat, low-fat, or fat-free cheeses. Nonfat, low-sodium ricotta or cottage cheese. Low-fat or nonfat yogurt. Low-fat, low-sodium cheese.  Fats and oils  Soft margarine without trans fats. Vegetable oil. Reduced-fat, low-fat, or light mayonnaise and salad dressings (reduced-sodium). Canola, safflower, olive, avocado, soybean, and sunflower oils. Avocado.  Seasonings and condiments  Herbs. Spices. Seasoning mixes without salt.  Other foods  Unsalted popcorn and pretzels. Fat-free sweets.  The items listed above may not be a complete list of foods and beverages you can eat. Contact a dietitian for more information.  What foods should I avoid?  Fruits  Canned fruit in a light or heavy syrup. Fried fruit. Fruit in cream or butter sauce.  Vegetables  Creamed or fried vegetables. Vegetables in a cheese sauce. Regular canned vegetables (not low-sodium or reduced-sodium). Regular canned tomato sauce and paste (not low-sodium or reduced-sodium). Regular tomato and vegetable juice (not low-sodium or reduced-sodium). Pickles. Olives.  Grains  Baked goods made with fat, such as croissants, muffins, or some breads. Dry pasta or rice meal packs.  Meats and other proteins  Fatty cuts of meat. Ribs. Fried meat. Morfin. Bologna, salami, and other precooked or cured meats, such as sausages or meat loaves. Fat from the back of a pig (fatback).  Bratwurst. Salted nuts and seeds. Canned beans with added salt. Canned or smoked fish. Whole eggs or egg yolks. Chicken or turkey with skin.  Dairy  Whole or 2% milk, cream, and half-and-half. Whole or full-fat cream cheese. Whole-fat or sweetened yogurt. Full-fat cheese. Nondairy creamers. Whipped toppings. Processed cheese and cheese spreads.  Fats and oils  Butter. Stick margarine. Lard. Shortening. Ghee. Morfin fat. Tropical oils, such as coconut, palm kernel, or palm oil.  Seasonings and condiments  Onion salt, garlic salt, seasoned salt, table salt, and sea salt. Worcestershire sauce. Tartar sauce. Barbecue sauce. Teriyaki sauce. Soy sauce, including reduced-sodium. Steak sauce. Canned and packaged gravies. Fish sauce. Oyster sauce. Cocktail sauce. Store-bought horseradish. Ketchup. Mustard. Meat flavorings and tenderizers. Bouillon cubes. Hot sauces. Pre-made or packaged marinades. Pre-made or packaged taco seasonings. Relishes. Regular salad dressings.  Other foods  Salted popcorn and pretzels.  The items listed above may not be a complete list of foods and beverages you should avoid. Contact a dietitian for more information.  Where to find more information  National Heart, Lung, and Blood [place]: www.nhlbi.nih.gov  American Heart Association: www.heart.org  Academy of Nutrition and Dietetics: www.eatright.org  National Kidney Foundation: www.kidney.org  Summary  The DASH eating plan is a healthy eating plan that has been shown to reduce high blood pressure (hypertension). It may also reduce your risk for type 2 diabetes, heart disease, and stroke.  When on the DASH eating plan, aim to eat more fresh fruits and vegetables, whole grains, lean proteins, low-fat dairy, and heart-healthy fats.  With the DASH eating plan, you should limit salt (sodium) intake to 2,300 mg a day. If you have hypertension, you may need to reduce your sodium intake to 1,500 mg a day.  Work with your health care provider or  dietitian to adjust your eating plan to your individual calorie needs.  This information is not intended to replace advice given to you by your health care provider. Make sure you discuss any questions you have with your health care provider.  Document Revised: 11/20/2020 Document Reviewed: 11/20/2020  [employer] Patient Education © 2021 [employer]  BMI for Adults  What is BMI?  Body mass index (BMI) is a number that is calculated from a person's weight and height. BMI can help estimate how much of a person's weight is composed of fat. BMI does not measure body fat directly. Rather, it is an alternative to procedures that directly measure body fat, which can be difficult and expensive.  BMI can help identify people who may be at higher risk for certain medical problems.  What are BMI measurements used for?  BMI is used as a screening tool to identify possible weight problems. It helps determine whether a person is obese, overweight, a healthy weight, or underweight.  BMI is useful for:  Identifying a weight problem that may be related to a medical condition or may increase the risk for medical problems.  Promoting changes, such as changes in diet and exercise, to help reach a healthy weight. BMI screening can be repeated to see if these changes are working.  How is BMI calculated?  BMI involves measuring your weight in relation to your height. Both height and weight are measured, and the BMI is calculated from those numbers. This can be done either in English ([place] or metric measurements. Note that charts and online BMI calculators are available to help you find your BMI quickly and easily without having to do these calculations yourself.  To calculate your BMI in English ([place] measurements:    Measure your weight in pounds (lb).  Multiply the number of pounds by 703.  For example, for a person who weighs 180 lb, multiply that number by 703, which equals 126,540.  Measure your height in inches. Then multiply that  "number by itself to get a measurement called \"inches squared.\"  For example, for a person who is 70 inches tall, the \"inches squared\" measurement is 70 inches x 70 inches, which equals 4,900 inches squared.  Divide the total from step 2 (number of lb x 703) by the total from step 3 (inches squared): 126,540 ÷ 4,900 = 25.8. This is your BMI.    To calculate your BMI in metric measurements:  Measure your weight in kilograms (kg).  Measure your height in meters (m). Then multiply that number by itself to get a measurement called \"meters squared.\"  For example, for a person who is 1.75 m tall, the \"meters squared\" measurement is 1.75 m x 1.75 m, which is equal to 3.1 meters squared.  Divide the number of kilograms (your weight) by the meters squared number. In this example: 70 ÷ 3.1 = 22.6. This is your BMI.  What do the results mean?  BMI charts are used to identify whether you are underweight, normal weight, overweight, or obese. The following guidelines will be used:  Underweight: BMI less than 18.5.  Normal weight: BMI between 18.5 and 24.9.  Overweight: BMI between 25 and 29.9.  Obese: BMI of 30 or above.  Keep these notes in mind:  Weight includes both fat and muscle, so someone with a muscular build, such as an athlete, may have a BMI that is higher than 24.9. In cases like these, BMI is not an accurate measure of body fat.  To determine if excess body fat is the cause of a BMI of 25 or higher, further assessments may need to be done by a health care provider.  BMI is usually interpreted in the same way for men and women.  Where to find more information  For more information about BMI, including tools to quickly calculate your BMI, go to these websites:  Centers for Disease Control and Prevention: www.cdc.gov  American Heart Association: www.heart.org  National Heart, Lung, and Blood [place]: www.nhlbi.nih.gov  Summary  Body mass index (BMI) is a number that is calculated from a person's weight and height.  BMI " may help estimate how much of a person's weight is composed of fat. BMI can help identify those who may be at higher risk for certain medical problems.  BMI can be measured using English measurements or metric measurements.  BMI charts are used to identify whether you are underweight, normal weight, overweight, or obese.  This information is not intended to replace advice given to you by your health care provider. Make sure you discuss any questions you have with your health care provider.  Document Revised: 09/09/2020 Document Reviewed: 07/17/2020  [employer] Patient Education © 2021 [employer]

## 2022-08-29 NOTE — PROGRESS NOTES
"  Chief complaint:   Chief Complaint   Patient presents with   • Post-op     Pt is here for a post op. His pain level is a 3/10.         Subjective     HPI: This is a 67 y.o. male patient who went to the operating room on 8/5/2022 for a C5-6 ACDF which was an extension of his previous surgery at C6-7.  Prior to surgery the patient was complaining of neck pain and bilateral upper extremity pain with the left being worse than the right.  The patient is here in follow up today for postoperative visit.  He did come in after his surgery with complaint of difficulty with swallowing.  The patient was placed on a Medrol Dosepak as he was having prevertebral swelling.  He comes in Today says that he is having very little neck pain.  He does still have some pain going over to his left upper extremity but does feel like it is better than what it was from before the surgery.  He does have numbness and tingling in his hands but again feels that this is getting better since the surgery.  His swallowing is improved.  He remains in the cervical collar.        Review of Systems   Musculoskeletal: Positive for neck pain.   Neurological: Positive for numbness.         Objective      Vital Signs  Ht 179 cm (70.47\")   Wt (!) 154 kg (339 lb)   BMI 47.99 kg/m²     Physical Exam  Constitutional:       Appearance: He is well-developed.   HENT:      Head: Normocephalic.   Eyes:      Extraocular Movements: EOM normal.      Pupils: Pupils are equal, round, and reactive to light.   Pulmonary:      Effort: Pulmonary effort is normal.   Musculoskeletal:         General: Normal range of motion.      Cervical back: Normal range of motion.   Skin:     General: Skin is warm.   Neurological:      Mental Status: He is alert and oriented to person, place, and time.      GCS: GCS eye subscore is 4. GCS verbal subscore is 5. GCS motor subscore is 6.      Cranial Nerves: No cranial nerve deficit.      Sensory: No sensory deficit.      Gait: Gait is " intact. Gait normal.      Deep Tendon Reflexes: Strength normal and reflexes are normal and symmetric.   Psychiatric:         Speech: Speech normal.         Behavior: Behavior normal.         Thought Content: Thought content normal.       Incisions clean dry and intact    Neurologic Exam     Mental Status   Oriented to person, place, and time.   Attention: normal. Concentration: normal.   Speech: speech is normal   Level of consciousness: alert  Normal comprehension.     Cranial Nerves     CN II   Visual fields full to confrontation.     CN III, IV, VI   Pupils are equal, round, and reactive to light.  Extraocular motions are normal.     CN V   Facial sensation intact.     CN VII   Facial expression full, symmetric.     CN VIII   CN VIII normal.     CN IX, X   CN IX normal.   CN X normal.     CN XI   CN XI normal.     CN XII   CN XII normal.     Motor Exam   Muscle bulk: normal    Strength   Strength 5/5 throughout.     Sensory Exam   Light touch normal.     Gait, Coordination, and Reflexes     Gait  Gait: normal    Reflexes   Reflexes 2+ except as noted.       Imaging review: No new imaging          Assessment/Plan: Patient is doing well from a surgery standpoint.  His symptoms are resolving.  At this time we will have him go for set of x-rays of the cervical spine and will follow up with him in 8 to 10 weeks.  He is told to call us if any further problems or concerns.    Patient is a nonsmoker  The patient's Body mass index is 47.99 kg/m².. BMI is above normal parameters. Recommendations include: educational material and nutrition counseling    Diagnoses and all orders for this visit:    1. Cervical myelopathy (HCC) (Primary)  -     XR Spine Cervical Complete 4 or 5 View    2. Spinal stenosis in cervical region    3. Class 3 severe obesity due to excess calories with body mass index (BMI) of 45.0 to 49.9 in adult, unspecified whether serious comorbidity present (HCC)    4. Nonsmoker        I discussed the patients  findings and my recommendations with patient  Willy Romero, APRN  08/29/22  14:55 CDT

## 2022-08-30 ENCOUNTER — TELEPHONE (OUTPATIENT)
Dept: NEUROSURGERY | Facility: CLINIC | Age: 67
End: 2022-08-30

## 2022-08-30 NOTE — TELEPHONE ENCOUNTER
----- Message from SUSAN Monroy sent at 8/30/2022  8:11 AM CDT -----  Can you please call the patient and let him know that his x-rays look good and he can come out of the brace on Monday, September 5  ----- Message -----  From: [facility]  Sent: 8/29/2022   4:46 PM CDT  To: SUSAN Monroy

## 2022-10-18 ENCOUNTER — LAB (OUTPATIENT)
Dept: LAB | Facility: HOSPITAL | Age: 67
End: 2022-10-18

## 2022-10-18 DIAGNOSIS — D50.0 IRON DEFICIENCY ANEMIA SECONDARY TO BLOOD LOSS (CHRONIC): ICD-10-CM

## 2022-10-18 DIAGNOSIS — N18.4 CHRONIC KIDNEY DISEASE, STAGE IV (SEVERE): ICD-10-CM

## 2022-10-18 DIAGNOSIS — N42.9 BENIGN PROSTATIC DISEASE: ICD-10-CM

## 2022-10-18 DIAGNOSIS — N18.4 STAGE 4 CHRONIC KIDNEY DISEASE: ICD-10-CM

## 2022-10-18 DIAGNOSIS — E78.5 HYPERLIPIDEMIA, UNSPECIFIED HYPERLIPIDEMIA TYPE: ICD-10-CM

## 2022-10-18 DIAGNOSIS — E11.42 TYPE 2 DIABETES MELLITUS WITH DIABETIC POLYNEUROPATHY, UNSPECIFIED WHETHER LONG TERM INSULIN USE: ICD-10-CM

## 2022-10-18 LAB
ALBUMIN SERPL-MCNC: 4 G/DL (ref 3.5–5.2)
ALBUMIN/GLOB SERPL: 1.5 G/DL
ALP SERPL-CCNC: 75 U/L (ref 39–117)
ALT SERPL W P-5'-P-CCNC: 10 U/L (ref 1–41)
ANION GAP SERPL CALCULATED.3IONS-SCNC: 10 MMOL/L (ref 5–15)
AST SERPL-CCNC: 14 U/L (ref 1–40)
BASOPHILS # BLD AUTO: 0.05 10*3/MM3 (ref 0–0.2)
BASOPHILS NFR BLD AUTO: 0.7 % (ref 0–1.5)
BILIRUB SERPL-MCNC: 0.5 MG/DL (ref 0–1.2)
BUN SERPL-MCNC: 27 MG/DL (ref 8–23)
BUN/CREAT SERPL: 13.7 (ref 7–25)
CALCIUM SPEC-SCNC: 8.8 MG/DL (ref 8.6–10.5)
CHLORIDE SERPL-SCNC: 104 MMOL/L (ref 98–107)
CO2 SERPL-SCNC: 23 MMOL/L (ref 22–29)
CREAT SERPL-MCNC: 1.97 MG/DL (ref 0.76–1.27)
DEPRECATED RDW RBC AUTO: 49.2 FL (ref 37–54)
EGFRCR SERPLBLD CKD-EPI 2021: 36.8 ML/MIN/1.73
EOSINOPHIL # BLD AUTO: 0.51 10*3/MM3 (ref 0–0.4)
EOSINOPHIL NFR BLD AUTO: 7.3 % (ref 0.3–6.2)
ERYTHROCYTE [DISTWIDTH] IN BLOOD BY AUTOMATED COUNT: 14.7 % (ref 12.3–15.4)
GLOBULIN UR ELPH-MCNC: 2.6 GM/DL
GLUCOSE SERPL-MCNC: 251 MG/DL (ref 65–99)
HBA1C MFR BLD: 10.9 % (ref 4.8–5.6)
HCT VFR BLD AUTO: 34.8 % (ref 37.5–51)
HGB BLD-MCNC: 11 G/DL (ref 13–17.7)
IMM GRANULOCYTES # BLD AUTO: 0.02 10*3/MM3 (ref 0–0.05)
IMM GRANULOCYTES NFR BLD AUTO: 0.3 % (ref 0–0.5)
LYMPHOCYTES # BLD AUTO: 1.17 10*3/MM3 (ref 0.7–3.1)
LYMPHOCYTES NFR BLD AUTO: 16.8 % (ref 19.6–45.3)
MAGNESIUM SERPL-MCNC: 2 MG/DL (ref 1.6–2.4)
MCH RBC QN AUTO: 28.8 PG (ref 26.6–33)
MCHC RBC AUTO-ENTMCNC: 31.6 G/DL (ref 31.5–35.7)
MCV RBC AUTO: 91.1 FL (ref 79–97)
MONOCYTES # BLD AUTO: 0.61 10*3/MM3 (ref 0.1–0.9)
MONOCYTES NFR BLD AUTO: 8.7 % (ref 5–12)
NEUTROPHILS NFR BLD AUTO: 4.62 10*3/MM3 (ref 1.7–7)
NEUTROPHILS NFR BLD AUTO: 66.2 % (ref 42.7–76)
NRBC BLD AUTO-RTO: 0 /100 WBC (ref 0–0.2)
PHOSPHATE SERPL-MCNC: 3 MG/DL (ref 2.5–4.5)
PLATELET # BLD AUTO: 226 10*3/MM3 (ref 140–450)
PMV BLD AUTO: 11.3 FL (ref 6–12)
POTASSIUM SERPL-SCNC: 5.1 MMOL/L (ref 3.5–5.2)
PROT SERPL-MCNC: 6.6 G/DL (ref 6–8.5)
RBC # BLD AUTO: 3.82 10*6/MM3 (ref 4.14–5.8)
SODIUM SERPL-SCNC: 137 MMOL/L (ref 136–145)
WBC NRBC COR # BLD: 6.98 10*3/MM3 (ref 3.4–10.8)

## 2022-10-18 PROCEDURE — 84100 ASSAY OF PHOSPHORUS: CPT

## 2022-10-18 PROCEDURE — 84153 ASSAY OF PSA TOTAL: CPT

## 2022-10-18 PROCEDURE — 83036 HEMOGLOBIN GLYCOSYLATED A1C: CPT

## 2022-10-18 PROCEDURE — 80061 LIPID PANEL: CPT

## 2022-10-18 PROCEDURE — 83735 ASSAY OF MAGNESIUM: CPT

## 2022-10-18 PROCEDURE — 83970 ASSAY OF PARATHORMONE: CPT

## 2022-10-18 PROCEDURE — 36415 COLL VENOUS BLD VENIPUNCTURE: CPT

## 2022-10-18 PROCEDURE — 83540 ASSAY OF IRON: CPT

## 2022-10-18 PROCEDURE — 82043 UR ALBUMIN QUANTITATIVE: CPT

## 2022-10-18 PROCEDURE — 82728 ASSAY OF FERRITIN: CPT

## 2022-10-18 PROCEDURE — 80050 GENERAL HEALTH PANEL: CPT

## 2022-10-18 PROCEDURE — 84550 ASSAY OF BLOOD/URIC ACID: CPT

## 2022-10-18 PROCEDURE — 82306 VITAMIN D 25 HYDROXY: CPT

## 2022-10-18 PROCEDURE — 82570 ASSAY OF URINE CREATININE: CPT

## 2022-10-18 PROCEDURE — 84466 ASSAY OF TRANSFERRIN: CPT

## 2022-10-19 LAB
25(OH)D3 SERPL-MCNC: 30.5 NG/ML (ref 30–100)
ALBUMIN UR-MCNC: 85.8 MG/DL
CHOLEST SERPL-MCNC: 142 MG/DL (ref 0–200)
CREAT UR-MCNC: 79.2 MG/DL
FERRITIN SERPL-MCNC: 78.2 NG/ML (ref 30–400)
HDLC SERPL-MCNC: 42 MG/DL (ref 40–60)
IRON 24H UR-MRATE: 45 MCG/DL (ref 59–158)
IRON SATN MFR SERPL: 13 % (ref 20–50)
LDLC SERPL CALC-MCNC: 72 MG/DL (ref 0–100)
LDLC/HDLC SERPL: 1.61 {RATIO}
MICROALBUMIN/CREAT UR: 1083.3 MG/G
PSA SERPL-MCNC: 0.3 NG/ML (ref 0–4)
PTH-INTACT SERPL-MCNC: 89 PG/ML (ref 15–65)
TIBC SERPL-MCNC: 341 MCG/DL (ref 298–536)
TRANSFERRIN SERPL-MCNC: 229 MG/DL (ref 200–360)
TRIGL SERPL-MCNC: 161 MG/DL (ref 0–150)
TSH SERPL DL<=0.05 MIU/L-ACNC: 1.48 UIU/ML (ref 0.27–4.2)
URATE SERPL-MCNC: 7.4 MG/DL (ref 3.4–7)
VLDLC SERPL-MCNC: 28 MG/DL (ref 5–40)

## 2022-10-24 ENCOUNTER — TELEPHONE (OUTPATIENT)
Dept: NEUROSURGERY | Facility: CLINIC | Age: 67
End: 2022-10-24

## 2022-10-24 NOTE — TELEPHONE ENCOUNTER
Left a VM asking patient to call me back to confirm his appt on 10/27/22 with Dr Wood.    GAVIOTA ESPINOZA [facility]  PHYSICIAN LEAD  DR GABRIELLE WOOD  [facility] NEUROSURGERY

## 2022-10-27 ENCOUNTER — OFFICE VISIT (OUTPATIENT)
Dept: NEUROSURGERY | Facility: CLINIC | Age: 67
End: 2022-10-27

## 2022-10-27 VITALS — BODY MASS INDEX: 45.1 KG/M2 | HEIGHT: 70 IN | WEIGHT: 315 LBS

## 2022-10-27 DIAGNOSIS — E66.01 CLASS 3 SEVERE OBESITY DUE TO EXCESS CALORIES WITH SERIOUS COMORBIDITY AND BODY MASS INDEX (BMI) OF 45.0 TO 49.9 IN ADULT: ICD-10-CM

## 2022-10-27 DIAGNOSIS — M50.30 DEGENERATION OF CERVICAL INTERVERTEBRAL DISC: ICD-10-CM

## 2022-10-27 DIAGNOSIS — G95.9 CERVICAL MYELOPATHY: Primary | ICD-10-CM

## 2022-10-27 DIAGNOSIS — M54.12 CERVICAL RADICULOPATHY: ICD-10-CM

## 2022-10-27 DIAGNOSIS — M48.02 SPINAL STENOSIS IN CERVICAL REGION: ICD-10-CM

## 2022-10-27 DIAGNOSIS — Z78.9 NONSMOKER: ICD-10-CM

## 2022-10-27 PROCEDURE — 99024 POSTOP FOLLOW-UP VISIT: CPT | Performed by: NEUROLOGICAL SURGERY

## 2022-10-27 NOTE — PATIENT INSTRUCTIONS
"PATIENT TO CONTINUE TO FOLLOW UP WITH HIS PRIMARY CARE PROVIDER FOR YEARLY PHYSICAL EXAMS TO ENSURE COMPLETE HEALTH MAINTENANCE      BMI for Adults  What is BMI?  Body mass index (BMI) is a number that is calculated from a person's weight and height. BMI can help estimate how much of a person's weight is composed of fat. BMI does not measure body fat directly. Rather, it is an alternative to procedures that directly measure body fat, which can be difficult and expensive.  BMI can help identify people who may be at higher risk for certain medical problems.  What are BMI measurements used for?  BMI is used as a screening tool to identify possible weight problems. It helps determine whether a person is obese, overweight, a healthy weight, or underweight.  BMI is useful for:  Identifying a weight problem that may be related to a medical condition or may increase the risk for medical problems.  Promoting changes, such as changes in diet and exercise, to help reach a healthy weight. BMI screening can be repeated to see if these changes are working.  How is BMI calculated?  BMI involves measuring your weight in relation to your height. Both height and weight are measured, and the BMI is calculated from those numbers. This can be done either in English (U[place] or metric measurements. Note that charts and online BMI calculators are available to help you find your BMI quickly and easily without having to do these calculations yourself.  To calculate your BMI in English (U[place] measurements:    Measure your weight in pounds (lb).  Multiply the number of pounds by 703.  For example, for a person who weighs 180 lb, multiply that number by 703, which equals 126,540.  Measure your height in inches. Then multiply that number by itself to get a measurement called \"inches squared.\"  For example, for a person who is 70 inches tall, the \"inches squared\" measurement is 70 inches x 70 inches, which equals 4,900 inches squared.  Divide the " "total from step 2 (number of lb x 703) by the total from step 3 (inches squared): 126,540 ÷ 4,900 = 25.8. This is your BMI.  To calculate your BMI in metric measurements:  Measure your weight in kilograms (kg).  Measure your height in meters (m). Then multiply that number by itself to get a measurement called \"meters squared.\"  For example, for a person who is 1.75 m tall, the \"meters squared\" measurement is 1.75 m x 1.75 m, which is equal to 3.1 meters squared.  Divide the number of kilograms (your weight) by the meters squared number. In this example: 70 ÷ 3.1 = 22.6. This is your BMI.  What do the results mean?  BMI charts are used to identify whether you are underweight, normal weight, overweight, or obese. The following guidelines will be used:  Underweight: BMI less than 18.5.  Normal weight: BMI between 18.5 and 24.9.  Overweight: BMI between 25 and 29.9.  Obese: BMI of 30 or above.  Keep these notes in mind:  Weight includes both fat and muscle, so someone with a muscular build, such as an athlete, may have a BMI that is higher than 24.9. In cases like these, BMI is not an accurate measure of body fat.  To determine if excess body fat is the cause of a BMI of 25 or higher, further assessments may need to be done by a health care provider.  BMI is usually interpreted in the same way for men and women.  Where to find more information  For more information about BMI, including tools to quickly calculate your BMI, go to these websites:  Centers for Disease Control and Prevention: www.cdc.gov  American Heart Association: www.heart.org  National Heart, Lung, and Blood [place]: www.nhlbi.nih.gov  Summary  Body mass index (BMI) is a number that is calculated from a person's weight and height.  BMI may help estimate how much of a person's weight is composed of fat. BMI can help identify those who may be at higher risk for certain medical problems.  BMI can be measured using English measurements or metric " "measurements.  BMI charts are used to identify whether you are underweight, normal weight, overweight, or obese.  This information is not intended to replace advice given to you by your health care provider. Make sure you discuss any questions you have with your health care provider.  Document Revised: 09/09/2020 Document Reviewed: 07/17/2020  [employer] Patient Education © 2022 [employer]  DASH Eating Plan  DASH stands for Dietary Approaches to Stop Hypertension. The DASH eating plan is a healthy eating plan that has been shown to:  Reduce high blood pressure (hypertension).  Reduce your risk for type 2 diabetes, heart disease, and stroke.  Help with weight loss.  What are tips for following this plan?  Reading food labels  Check food labels for the amount of salt (sodium) per serving. Choose foods with less than 5 percent of the Daily Value of sodium. Generally, foods with less than 300 milligrams (mg) of sodium per serving fit into this eating plan.  To find whole grains, look for the word \"whole\" as the first word in the ingredient list.  Shopping  Buy products labeled as \"low-sodium\" or \"no salt added.\"  Buy fresh foods. Avoid canned foods and pre-made or frozen meals.  Cooking  Avoid adding salt when cooking. Use salt-free seasonings or herbs instead of table salt or sea salt. Check with your health care provider or pharmacist before using salt substitutes.  Do not vera foods. Cook foods using healthy methods such as baking, boiling, grilling, roasting, and broiling instead.  Cook with heart-healthy oils, such as olive, canola, avocado, soybean, or sunflower oil.  Meal planning    Eat a balanced diet that includes:  4 or more servings of fruits and 4 or more servings of vegetables each day. Try to fill one-half of your plate with fruits and vegetables.  6-8 servings of whole grains each day.  Less than 6 oz (170 g) of lean meat, poultry, or fish each day. A 3-oz (85-g) serving of meat is about the same size as " a deck of cards. One egg equals 1 oz (28 g).  2-3 servings of low-fat dairy each day. One serving is 1 cup (237 mL).  1 serving of nuts, seeds, or beans 5 times each week.  2-3 servings of heart-healthy fats. Healthy fats called omega-3 fatty acids are found in foods such as walnuts, flaxseeds, fortified milks, and eggs. These fats are also found in cold-water fish, such as sardines, salmon, and mackerel.  Limit how much you eat of:  Canned or prepackaged foods.  Food that is high in trans fat, such as some fried foods.  Food that is high in saturated fat, such as fatty meat.  Desserts and other sweets, sugary drinks, and other foods with added sugar.  Full-fat dairy products.  Do not salt foods before eating.  Do not eat more than 4 egg yolks a week.  Try to eat at least 2 vegetarian meals a week.  Eat more home-cooked food and less restaurant, buffet, and fast food.  Lifestyle  When eating at a restaurant, ask that your food be prepared with less salt or no salt, if possible.  If you drink alcohol:  Limit how much you use to:  0-1 drink a day for women who are not pregnant.  0-2 drinks a day for men.  Be aware of how much alcohol is in your drink. In the [place] one drink equals one 12 oz bottle of beer (355 mL), one 5 oz glass of wine (148 mL), or one 1½ oz glass of hard liquor (44 mL).  General information  Avoid eating more than 2,300 mg of salt a day. If you have hypertension, you may need to reduce your sodium intake to 1,500 mg a day.  Work with your health care provider to maintain a healthy body weight or to lose weight. Ask what an ideal weight is for you.  Get at least 30 minutes of exercise that causes your heart to beat faster (aerobic exercise) most days of the week. Activities may include walking, swimming, or biking.  Work with your health care provider or dietitian to adjust your eating plan to your individual calorie needs.  What foods should I eat?  Fruits  All fresh, dried, or frozen fruit. Canned  fruit in natural juice (without added sugar).  Vegetables  Fresh or frozen vegetables (raw, steamed, roasted, or grilled). Low-sodium or reduced-sodium tomato and vegetable juice. Low-sodium or reduced-sodium tomato sauce and tomato paste. Low-sodium or reduced-sodium canned vegetables.  Grains  Whole-grain or whole-wheat bread. Whole-grain or whole-wheat pasta. Brown rice. Oatmeal. Quinoa. Bulgur. Whole-grain and low-sodium cereals. Radha bread. Low-fat, low-sodium crackers. Whole-wheat flour tortillas.  Meats and other proteins  Skinless chicken or turkey. Ground chicken or turkey. Pork with fat trimmed off. Fish and seafood. Egg whites. Dried beans, peas, or lentils. Unsalted nuts, nut butters, and seeds. Unsalted canned beans. Lean cuts of beef with fat trimmed off. Low-sodium, lean precooked or cured meat, such as sausages or meat loaves.  Dairy  Low-fat (1%) or fat-free (skim) milk. Reduced-fat, low-fat, or fat-free cheeses. Nonfat, low-sodium ricotta or cottage cheese. Low-fat or nonfat yogurt. Low-fat, low-sodium cheese.  Fats and oils  Soft margarine without trans fats. Vegetable oil. Reduced-fat, low-fat, or light mayonnaise and salad dressings (reduced-sodium). Canola, safflower, olive, avocado, soybean, and sunflower oils. Avocado.  Seasonings and condiments  Herbs. Spices. Seasoning mixes without salt.  Other foods  Unsalted popcorn and pretzels. Fat-free sweets.  The items listed above may not be a complete list of foods and beverages you can eat. Contact a dietitian for more information.  What foods should I avoid?  Fruits  Canned fruit in a light or heavy syrup. Fried fruit. Fruit in cream or butter sauce.  Vegetables  Creamed or fried vegetables. Vegetables in a cheese sauce. Regular canned vegetables (not low-sodium or reduced-sodium). Regular canned tomato sauce and paste (not low-sodium or reduced-sodium). Regular tomato and vegetable juice (not low-sodium or reduced-sodium). Pickles.  Olives.  Grains  Baked goods made with fat, such as croissants, muffins, or some breads. Dry pasta or rice meal packs.  Meats and other proteins  Fatty cuts of meat. Ribs. Fried meat. Morfin. Bologna, salami, and other precooked or cured meats, such as sausages or meat loaves. Fat from the back of a pig (fatback). Bratwurst. Salted nuts and seeds. Canned beans with added salt. Canned or smoked fish. Whole eggs or egg yolks. Chicken or turkey with skin.  Dairy  Whole or 2% milk, cream, and half-and-half. Whole or full-fat cream cheese. Whole-fat or sweetened yogurt. Full-fat cheese. Nondairy creamers. Whipped toppings. Processed cheese and cheese spreads.  Fats and oils  Butter. Stick margarine. Lard. Shortening. Ghee. Morfin fat. Tropical oils, such as coconut, palm kernel, or palm oil.  Seasonings and condiments  Onion salt, garlic salt, seasoned salt, table salt, and sea salt. [facility] sauce. Tartar sauce. Barbecue sauce. Teriyaki sauce. Soy sauce, including reduced-sodium. Steak sauce. Canned and packaged gravies. Fish sauce. Oyster sauce. Cocktail sauce. Store-bought horseradish. Ketchup. Mustard. Meat flavorings and tenderizers. Bouillon cubes. Hot sauces. Pre-made or packaged marinades. Pre-made or packaged taco seasonings. Relishes. Regular salad dressings.  Other foods  Salted popcorn and pretzels.  The items listed above may not be a complete list of foods and beverages you should avoid. Contact a dietitian for more information.  Where to find more information  National Heart, Lung, and Blood [place]: www.nhlbi.nih.gov  American Heart Association: www.heart.org  Academy of Nutrition and Dietetics: www.eatright.org  National Kidney Foundation: www.kidney.org  Summary  The DASH eating plan is a healthy eating plan that has been shown to reduce high blood pressure (hypertension). It may also reduce your risk for type 2 diabetes, heart disease, and stroke.  When on the DASH eating plan, aim to eat more  fresh fruits and vegetables, whole grains, lean proteins, low-fat dairy, and heart-healthy fats.  With the DASH eating plan, you should limit salt (sodium) intake to 2,300 mg a day. If you have hypertension, you may need to reduce your sodium intake to 1,500 mg a day.  Work with your health care provider or dietitian to adjust your eating plan to your individual calorie needs.  This information is not intended to replace advice given to you by your health care provider. Make sure you discuss any questions you have with your health care provider.  Document Revised: 11/20/2020 Document Reviewed: 11/20/2020  [employer] Patient Education © 2022 [employer]

## 2022-10-27 NOTE — PROGRESS NOTES
SUBJECTIVE:  Patient ID: Erick Luong is a 67 y.o. male is here today for follow-up.    Chief Complaint: Neck pain  Chief Complaint   Patient presents with   • CERVICAL MYELOPATHY     Wound check: 8/5/22 C5/6 ACDF  Today patient states he continues to do well but says his neck hurts when he turns it.       HPI  67-year-old gentleman that underwent a C5-6 anterior cervical fusion which was extensive a previous C6-7 fusion for cervical myelopathy.  He is now much better.  He has no neck pain.  No upper extremity pain.  He has some numbness in his hands still.  He says that that is improving as the weeks go by.  Right now is very satisfied with results of his surgery.    The following portions of the patient's history were reviewed and updated as appropriate: allergies, current medications, past family history, past medical history, past social history, past surgical history and problem list.    OBJECTIVE:    Review of Systems   Neurological: Positive for numbness.   All other systems reviewed and are negative.         Physical Exam  Eyes:      Extraocular Movements: EOM normal.      Pupils: Pupils are equal, round, and reactive to light.   Neurological:      Mental Status: He is oriented to person, place, and time.      Motor: Motor strength is normal.      Coordination: Finger-Nose-Finger Test normal.      Gait: Gait is intact.   Psychiatric:         Speech: Speech normal.         Neurologic Exam     Mental Status   Oriented to person, place, and time.   Attention: normal.   Speech: speech is normal   Level of consciousness: alert  Knowledge: good.     Cranial Nerves     CN II   Visual fields full to confrontation.     CN III, IV, VI   Pupils are equal, round, and reactive to light.  Extraocular motions are normal.     CN V   Facial sensation intact.     CN VII   Facial expression full, symmetric.     CN VIII   CN VIII normal.     CN IX, X   CN IX normal.   CN X normal.     CN XI   CN XI normal.     CN XII   CN XII  normal.     Motor Exam   Muscle bulk: normal  Overall muscle tone: normal  Right arm pronator drift: absent  Left arm pronator drift: absent    Strength   Strength 5/5 throughout.     Sensory Exam   Light touch normal.   Pinprick normal.     Gait, Coordination, and Reflexes     Gait  Gait: normal    Coordination   Finger to nose coordination: normal    Tremor   Resting tremor: absent  Intention tremor: absent  Action tremor: absent    Reflexes   Reflexes 2+ except as noted.       Independent Review of Radiographic Studies:   X-rays show good position of all interbody devices and hardware    ASSESSMENT/PLAN:  Erick is doing much better.  His neurologic exam is normal at this point.  His pain issues have significantly improved.  We will see him in follow-up in 3 months.      1. Cervical myelopathy (HCC)    2. Spinal stenosis in cervical region    3. Cervical radiculopathy    4. Degeneration of cervical intervertebral disc    5. Nonsmoker    6. Class 3 severe obesity due to excess calories with serious comorbidity and body mass index (BMI) of 45.0 to 49.9 in adult (HCC)        The patient's Body mass index is 49.98 kg/m².. BMI is above normal parameters. Recommendations include: educational material    Return in about 3 months (around 1/27/2023) for SYMPTOMS RECHECK (15 MINS) - EDUARD Soliz MD

## 2022-12-21 LAB
ALBUMIN SERPL-MCNC: 3.7 G/DL (ref 3.5–5.2)
ALP BLD-CCNC: 96 U/L (ref 40–130)
ALT SERPL-CCNC: 9 U/L (ref 5–41)
ANION GAP SERPL CALCULATED.3IONS-SCNC: 14 MMOL/L (ref 7–19)
AST SERPL-CCNC: 11 U/L (ref 5–40)
BACTERIA: ABNORMAL /HPF
BASOPHILS ABSOLUTE: 0.1 K/UL (ref 0–0.2)
BASOPHILS RELATIVE PERCENT: 0.8 % (ref 0–1)
BILIRUB SERPL-MCNC: 0.3 MG/DL (ref 0.2–1.2)
BILIRUBIN URINE: NEGATIVE
BLOOD, URINE: ABNORMAL
BUN BLDV-MCNC: 29 MG/DL (ref 8–23)
CALCIUM SERPL-MCNC: 9.9 MG/DL (ref 8.8–10.2)
CHLORIDE BLD-SCNC: 104 MMOL/L (ref 98–111)
CLARITY: CLEAR
CO2: 24 MMOL/L (ref 22–29)
COLOR: YELLOW
CREAT SERPL-MCNC: 1.9 MG/DL (ref 0.5–1.2)
CREATININE URINE: 114.8 MG/DL (ref 4.2–622)
EOSINOPHILS ABSOLUTE: 0.4 K/UL (ref 0–0.6)
EOSINOPHILS RELATIVE PERCENT: 6 % (ref 0–5)
EPITHELIAL CELLS, UA: ABNORMAL /HPF
GFR SERPL CREATININE-BSD FRML MDRD: 38 ML/MIN/{1.73_M2}
GLUCOSE BLD-MCNC: 316 MG/DL (ref 74–109)
GLUCOSE URINE: =>1000 MG/DL
HCT VFR BLD CALC: 39.9 % (ref 42–52)
HEMOGLOBIN: 13.2 G/DL (ref 14–18)
IMMATURE GRANULOCYTES #: 0 K/UL
KETONES, URINE: 15 MG/DL
LEUKOCYTE ESTERASE, URINE: NEGATIVE
LYMPHOCYTES ABSOLUTE: 1 K/UL (ref 1.1–4.5)
LYMPHOCYTES RELATIVE PERCENT: 13.8 % (ref 20–40)
MCH RBC QN AUTO: 29.4 PG (ref 27–31)
MCHC RBC AUTO-ENTMCNC: 33.1 G/DL (ref 33–37)
MCV RBC AUTO: 88.9 FL (ref 80–94)
MONOCYTES ABSOLUTE: 0.5 K/UL (ref 0–0.9)
MONOCYTES RELATIVE PERCENT: 7.1 % (ref 0–10)
NEUTROPHILS ABSOLUTE: 5.3 K/UL (ref 1.5–7.5)
NEUTROPHILS RELATIVE PERCENT: 71.8 % (ref 50–65)
NITRITE, URINE: NEGATIVE
PARATHYROID HORMONE INTACT: 30.2 PG/ML (ref 15–65)
PDW BLD-RTO: 13.3 % (ref 11.5–14.5)
PH UA: 5.5 (ref 5–8)
PLATELET # BLD: 249 K/UL (ref 130–400)
PMV BLD AUTO: 11.4 FL (ref 9.4–12.4)
POTASSIUM SERPL-SCNC: 4.7 MMOL/L (ref 3.5–5)
PROTEIN PROTEIN: 496 MG/DL (ref 15–45)
PROTEIN UA: 300 MG/DL
RBC # BLD: 4.49 M/UL (ref 4.7–6.1)
RBC UA: ABNORMAL /HPF (ref 0–2)
SODIUM BLD-SCNC: 142 MMOL/L (ref 136–145)
SPECIFIC GRAVITY UA: 1.03 (ref 1–1.03)
TOTAL PROTEIN: 6.7 G/DL (ref 6.6–8.7)
UROBILINOGEN, URINE: 1 E.U./DL
VITAMIN D 25-HYDROXY: 18.9 NG/ML
WBC # BLD: 7.3 K/UL (ref 4.8–10.8)
WBC UA: ABNORMAL /HPF (ref 0–5)

## 2023-01-04 ENCOUNTER — TELEPHONE (OUTPATIENT)
Dept: NEUROSURGERY | Facility: CLINIC | Age: 68
End: 2023-01-04
Payer: COMMERCIAL

## 2023-01-04 NOTE — TELEPHONE ENCOUNTER
Placed a call to inform pt his appt will need to be moved to 245p on the 01/18/22 due to Willy not being in the office that morning. N/a lvm

## 2023-01-04 NOTE — TELEPHONE ENCOUNTER
Patients wife called back and agreed to the 2:45pm appt time for now but she will have to look at her work schedule and call back to reschedule if she is unable to get off work.

## 2023-01-16 NOTE — TELEPHONE ENCOUNTER
Placed a call to inform pt we had some earlier appts to come harpreet and if wanted to come in earlier. N/A LVM

## 2023-01-18 ENCOUNTER — OFFICE VISIT (OUTPATIENT)
Dept: NEUROSURGERY | Facility: CLINIC | Age: 68
End: 2023-01-18
Payer: COMMERCIAL

## 2023-01-18 VITALS — BODY MASS INDEX: 45.1 KG/M2 | HEIGHT: 70 IN | WEIGHT: 315 LBS

## 2023-01-18 DIAGNOSIS — Z78.9 NON-SMOKER: ICD-10-CM

## 2023-01-18 DIAGNOSIS — G56.03 BILATERAL CARPAL TUNNEL SYNDROME: Primary | ICD-10-CM

## 2023-01-18 DIAGNOSIS — E66.01 CLASS 3 SEVERE OBESITY DUE TO EXCESS CALORIES WITH SERIOUS COMORBIDITY AND BODY MASS INDEX (BMI) OF 50.0 TO 59.9 IN ADULT: ICD-10-CM

## 2023-01-18 PROCEDURE — 99213 OFFICE O/P EST LOW 20 MIN: CPT | Performed by: NURSE PRACTITIONER

## 2023-01-18 NOTE — PROGRESS NOTES
"    Chief complaint:   Chief Complaint   Patient presents with   • Neck Pain     Patient here for follow up.        Subjective     HPI: This is a 67-year-old male gentleman who went to the operating room on August 5, 2022 for an extension of C6-7 fusion to C5-6 for cervical myelopathy along with neck pain and arm pain and numbness and tingling.  At his last office appointment the patient did feel he was doing better and was not complaining of any neck pain or upper extremity pain but did still have numbness in his hands.  He did feel like it was improving at his last office appointment.  He is here today in follow-up.  The patient says that he is not having any issues with his neck pain.  He does not have any radicular type arm pain.  His main complaint is just bilateral hand numbness and tingling.  He says he is having difficulty with holding onto things.  He says it does wake him up from sleeping at night.  He says it can get to the point where it is causing him pain in his hands.  He has never been tested for carpal tunnel syndrome    Review of Systems   Musculoskeletal: Positive for arthralgias and myalgias. Negative for neck pain.   Neurological: Positive for numbness.   Psychiatric/Behavioral: Negative.          Objective      Vital Signs  Ht 177.8 cm (70\")   Wt (!) 159 kg (350 lb)   BMI 50.22 kg/m²     Physical Exam  Constitutional:       Appearance: He is well-developed.   HENT:      Head: Normocephalic.   Eyes:      Extraocular Movements: EOM normal.      Pupils: Pupils are equal, round, and reactive to light.   Pulmonary:      Effort: Pulmonary effort is normal.   Musculoskeletal:         General: Normal range of motion.      Cervical back: Normal range of motion.   Skin:     General: Skin is warm.   Neurological:      Mental Status: He is alert and oriented to person, place, and time.      GCS: GCS eye subscore is 4. GCS verbal subscore is 5. GCS motor subscore is 6.      Cranial Nerves: No cranial " nerve deficit.      Sensory: No sensory deficit.      Motor: Motor strength is normal.      Gait: Gait is intact. Gait normal.      Deep Tendon Reflexes: Reflexes are normal and symmetric.   Psychiatric:         Speech: Speech normal.         Behavior: Behavior normal.         Thought Content: Thought content normal.         Neurologic Exam     Mental Status   Oriented to person, place, and time.   Attention: normal. Concentration: normal.   Speech: speech is normal   Level of consciousness: alert  Normal comprehension.     Cranial Nerves     CN II   Visual fields full to confrontation.     CN III, IV, VI   Pupils are equal, round, and reactive to light.  Extraocular motions are normal.     CN V   Facial sensation intact.     CN VII   Facial expression full, symmetric.     CN VIII   CN VIII normal.     CN IX, X   CN IX normal.   CN X normal.     CN XI   CN XI normal.     CN XII   CN XII normal.     Motor Exam   Muscle bulk: normal    Strength   Strength 5/5 throughout.     Sensory Exam   Light touch normal.     Gait, Coordination, and Reflexes     Gait  Gait: normal    Reflexes   Reflexes 2+ except as noted.       Imaging review: No new imaging        Assessment/Plan: Patient does have a clinically sounds like carpal tunnel syndrome.  We will get him set up to do an EMG/NCS of the bilateral upper extremities.  We will also go ahead and place him in cock-up splints to see if this will help with his numbness and tingling and pain.  We will follow-up with him after testing is completed and make further recommendations at that time.    Patient is a nonsmoker  The patient's Body mass index is 50.22 kg/m².. BMI is above normal parameters. Recommendations include: educational material and nutrition counseling  Advance Care Planning   ACP discussion was held with the patient during this visit. Patient does not have an advance directive, information provided.   STEADI Fall Risk Assessment was completed, and patient is at LOW  risk for falls.Assessment completed on:1/18/2023     Diagnoses and all orders for this visit:    1. Bilateral carpal tunnel syndrome (Primary)  -     EMG & Nerve Conduction Test; Future  -     Miscellaneous DME    2. Class 3 severe obesity due to excess calories with serious comorbidity and body mass index (BMI) of 50.0 to 59.9 in adult (HCC)    3. Non-smoker        I discussed the patients findings and my recommendations with patient  Willy Romero, APRN  01/18/23  12:12 CST

## 2023-01-19 ENCOUNTER — APPOINTMENT (OUTPATIENT)
Dept: CT IMAGING | Facility: HOSPITAL | Age: 68
DRG: 291 | End: 2023-01-19
Payer: COMMERCIAL

## 2023-01-19 ENCOUNTER — APPOINTMENT (OUTPATIENT)
Dept: GENERAL RADIOLOGY | Facility: HOSPITAL | Age: 68
DRG: 291 | End: 2023-01-19
Payer: COMMERCIAL

## 2023-01-19 ENCOUNTER — HOSPITAL ENCOUNTER (INPATIENT)
Facility: HOSPITAL | Age: 68
LOS: 7 days | Discharge: HOME | DRG: 291 | End: 2023-01-26
Attending: STUDENT IN AN ORGANIZED HEALTH CARE EDUCATION/TRAINING PROGRAM | Admitting: FAMILY MEDICINE
Payer: COMMERCIAL

## 2023-01-19 DIAGNOSIS — N18.9 CHRONIC KIDNEY DISEASE, UNSPECIFIED CKD STAGE: ICD-10-CM

## 2023-01-19 DIAGNOSIS — R06.02 SHORTNESS OF BREATH: ICD-10-CM

## 2023-01-19 DIAGNOSIS — R60.9 PITTING EDEMA: ICD-10-CM

## 2023-01-19 DIAGNOSIS — R19.5 DARK STOOLS: ICD-10-CM

## 2023-01-19 DIAGNOSIS — R07.9 ACUTE CHEST PAIN: Primary | ICD-10-CM

## 2023-01-19 DIAGNOSIS — R06.09 DYSPNEA ON EXERTION: ICD-10-CM

## 2023-01-19 LAB
ALBUMIN SERPL-MCNC: 4 G/DL (ref 3.5–5.2)
ALBUMIN/GLOB SERPL: 1.3 G/DL
ALP SERPL-CCNC: 94 U/L (ref 39–117)
ALT SERPL W P-5'-P-CCNC: 12 U/L (ref 1–41)
ANION GAP SERPL CALCULATED.3IONS-SCNC: 13 MMOL/L (ref 5–15)
AST SERPL-CCNC: 16 U/L (ref 1–40)
BASOPHILS # BLD AUTO: 0.04 10*3/MM3 (ref 0–0.2)
BASOPHILS NFR BLD AUTO: 0.7 % (ref 0–1.5)
BILIRUB SERPL-MCNC: 0.4 MG/DL (ref 0–1.2)
BUN SERPL-MCNC: 29 MG/DL (ref 8–23)
BUN/CREAT SERPL: 15 (ref 7–25)
CALCIUM SPEC-SCNC: 8.9 MG/DL (ref 8.6–10.5)
CHLORIDE SERPL-SCNC: 104 MMOL/L (ref 98–107)
CO2 SERPL-SCNC: 25 MMOL/L (ref 22–29)
CREAT SERPL-MCNC: 1.93 MG/DL (ref 0.76–1.27)
D-LACTATE SERPL-SCNC: 1.6 MMOL/L (ref 0.5–2)
D-LACTATE SERPL-SCNC: 2.6 MMOL/L (ref 0.5–2)
DEPRECATED RDW RBC AUTO: 45.2 FL (ref 37–54)
DEVELOPER EXPIRATION DATE: NORMAL
DEVELOPER LOT NUMBER: 225
EGFRCR SERPLBLD CKD-EPI 2021: 37.7 ML/MIN/1.73
EOSINOPHIL # BLD AUTO: 0.41 10*3/MM3 (ref 0–0.4)
EOSINOPHIL NFR BLD AUTO: 6.8 % (ref 0.3–6.2)
ERYTHROCYTE [DISTWIDTH] IN BLOOD BY AUTOMATED COUNT: 14 % (ref 12.3–15.4)
EXPIRATION DATE: NORMAL
FECAL OCCULT BLOOD SCREEN, POC: NEGATIVE
FLUAV RNA RESP QL NAA+PROBE: NOT DETECTED
FLUBV RNA RESP QL NAA+PROBE: NOT DETECTED
GLOBULIN UR ELPH-MCNC: 3 GM/DL
GLUCOSE SERPL-MCNC: 205 MG/DL (ref 65–99)
HCT VFR BLD AUTO: 37.5 % (ref 37.5–51)
HGB BLD-MCNC: 11.9 G/DL (ref 13–17.7)
HOLD SPECIMEN: NORMAL
IMM GRANULOCYTES # BLD AUTO: 0.02 10*3/MM3 (ref 0–0.05)
IMM GRANULOCYTES NFR BLD AUTO: 0.3 % (ref 0–0.5)
LIPASE SERPL-CCNC: 6 U/L (ref 13–60)
LYMPHOCYTES # BLD AUTO: 1.26 10*3/MM3 (ref 0.7–3.1)
LYMPHOCYTES NFR BLD AUTO: 20.9 % (ref 19.6–45.3)
Lab: 225
MCH RBC QN AUTO: 28.1 PG (ref 26.6–33)
MCHC RBC AUTO-ENTMCNC: 31.7 G/DL (ref 31.5–35.7)
MCV RBC AUTO: 88.4 FL (ref 79–97)
MONOCYTES # BLD AUTO: 0.5 10*3/MM3 (ref 0.1–0.9)
MONOCYTES NFR BLD AUTO: 8.3 % (ref 5–12)
NEGATIVE CONTROL: NEGATIVE
NEUTROPHILS NFR BLD AUTO: 3.81 10*3/MM3 (ref 1.7–7)
NEUTROPHILS NFR BLD AUTO: 63 % (ref 42.7–76)
NRBC BLD AUTO-RTO: 0 /100 WBC (ref 0–0.2)
NT-PROBNP SERPL-MCNC: 932.9 PG/ML (ref 0–900)
PLATELET # BLD AUTO: 198 10*3/MM3 (ref 140–450)
PMV BLD AUTO: 11.1 FL (ref 6–12)
POSITIVE CONTROL: POSITIVE
POTASSIUM SERPL-SCNC: 4.6 MMOL/L (ref 3.5–5.2)
PROT SERPL-MCNC: 7 G/DL (ref 6–8.5)
RBC # BLD AUTO: 4.24 10*6/MM3 (ref 4.14–5.8)
SARS-COV-2 RNA RESP QL NAA+PROBE: NOT DETECTED
SODIUM SERPL-SCNC: 142 MMOL/L (ref 136–145)
TROPONIN T SERPL-MCNC: <0.01 NG/ML (ref 0–0.03)
TROPONIN T SERPL-MCNC: <0.01 NG/ML (ref 0–0.03)
WBC NRBC COR # BLD: 6.04 10*3/MM3 (ref 3.4–10.8)
WHOLE BLOOD HOLD COAG: NORMAL
WHOLE BLOOD HOLD SPECIMEN: NORMAL

## 2023-01-19 PROCEDURE — 83880 ASSAY OF NATRIURETIC PEPTIDE: CPT | Performed by: STUDENT IN AN ORGANIZED HEALTH CARE EDUCATION/TRAINING PROGRAM

## 2023-01-19 PROCEDURE — 25010000002 ONDANSETRON PER 1 MG: Performed by: FAMILY MEDICINE

## 2023-01-19 PROCEDURE — 82270 OCCULT BLOOD FECES: CPT | Performed by: STUDENT IN AN ORGANIZED HEALTH CARE EDUCATION/TRAINING PROGRAM

## 2023-01-19 PROCEDURE — G0378 HOSPITAL OBSERVATION PER HR: HCPCS

## 2023-01-19 PROCEDURE — 71045 X-RAY EXAM CHEST 1 VIEW: CPT

## 2023-01-19 PROCEDURE — 85025 COMPLETE CBC W/AUTO DIFF WBC: CPT | Performed by: STUDENT IN AN ORGANIZED HEALTH CARE EDUCATION/TRAINING PROGRAM

## 2023-01-19 PROCEDURE — 93005 ELECTROCARDIOGRAM TRACING: CPT

## 2023-01-19 PROCEDURE — 93005 ELECTROCARDIOGRAM TRACING: CPT | Performed by: STUDENT IN AN ORGANIZED HEALTH CARE EDUCATION/TRAINING PROGRAM

## 2023-01-19 PROCEDURE — 83605 ASSAY OF LACTIC ACID: CPT | Performed by: STUDENT IN AN ORGANIZED HEALTH CARE EDUCATION/TRAINING PROGRAM

## 2023-01-19 PROCEDURE — 83690 ASSAY OF LIPASE: CPT | Performed by: STUDENT IN AN ORGANIZED HEALTH CARE EDUCATION/TRAINING PROGRAM

## 2023-01-19 PROCEDURE — 99284 EMERGENCY DEPT VISIT MOD MDM: CPT

## 2023-01-19 PROCEDURE — 71275 CT ANGIOGRAPHY CHEST: CPT

## 2023-01-19 PROCEDURE — 87636 SARSCOV2 & INF A&B AMP PRB: CPT | Performed by: STUDENT IN AN ORGANIZED HEALTH CARE EDUCATION/TRAINING PROGRAM

## 2023-01-19 PROCEDURE — 80053 COMPREHEN METABOLIC PANEL: CPT | Performed by: STUDENT IN AN ORGANIZED HEALTH CARE EDUCATION/TRAINING PROGRAM

## 2023-01-19 PROCEDURE — 94799 UNLISTED PULMONARY SVC/PX: CPT

## 2023-01-19 PROCEDURE — 74174 CTA ABD&PLVS W/CONTRAST: CPT

## 2023-01-19 PROCEDURE — 94640 AIRWAY INHALATION TREATMENT: CPT

## 2023-01-19 PROCEDURE — 93010 ELECTROCARDIOGRAM REPORT: CPT | Performed by: INTERNAL MEDICINE

## 2023-01-19 PROCEDURE — 0 IOPAMIDOL PER 1 ML: Performed by: STUDENT IN AN ORGANIZED HEALTH CARE EDUCATION/TRAINING PROGRAM

## 2023-01-19 PROCEDURE — 84484 ASSAY OF TROPONIN QUANT: CPT | Performed by: STUDENT IN AN ORGANIZED HEALTH CARE EDUCATION/TRAINING PROGRAM

## 2023-01-19 RX ORDER — BUMETANIDE 0.25 MG/ML
1 INJECTION INTRAMUSCULAR; INTRAVENOUS EVERY 12 HOURS
Status: DISCONTINUED | OUTPATIENT
Start: 2023-01-20 | End: 2023-01-22

## 2023-01-19 RX ORDER — CYCLOBENZAPRINE HCL 10 MG
10 TABLET ORAL 3 TIMES DAILY PRN
Status: DISCONTINUED | OUTPATIENT
Start: 2023-01-19 | End: 2023-01-26 | Stop reason: HOSPADM

## 2023-01-19 RX ORDER — PREGABALIN 50 MG/1
50 CAPSULE ORAL 3 TIMES DAILY
Status: DISCONTINUED | OUTPATIENT
Start: 2023-01-20 | End: 2023-01-26 | Stop reason: HOSPADM

## 2023-01-19 RX ORDER — CARVEDILOL 6.25 MG/1
6.25 TABLET ORAL 2 TIMES DAILY
Status: DISCONTINUED | OUTPATIENT
Start: 2023-01-20 | End: 2023-01-26 | Stop reason: HOSPADM

## 2023-01-19 RX ORDER — BUSPIRONE HYDROCHLORIDE 10 MG/1
10 TABLET ORAL EVERY 8 HOURS SCHEDULED
Status: DISCONTINUED | OUTPATIENT
Start: 2023-01-20 | End: 2023-01-26 | Stop reason: HOSPADM

## 2023-01-19 RX ORDER — ONDANSETRON 2 MG/ML
4 INJECTION INTRAMUSCULAR; INTRAVENOUS EVERY 6 HOURS PRN
Status: DISCONTINUED | OUTPATIENT
Start: 2023-01-19 | End: 2023-01-26 | Stop reason: HOSPADM

## 2023-01-19 RX ORDER — DULOXETIN HYDROCHLORIDE 30 MG/1
60 CAPSULE, DELAYED RELEASE ORAL DAILY
Status: DISCONTINUED | OUTPATIENT
Start: 2023-01-20 | End: 2023-01-26 | Stop reason: HOSPADM

## 2023-01-19 RX ORDER — DEXTROSE MONOHYDRATE 25 G/50ML
25 INJECTION, SOLUTION INTRAVENOUS
Status: DISCONTINUED | OUTPATIENT
Start: 2023-01-19 | End: 2023-01-26 | Stop reason: HOSPADM

## 2023-01-19 RX ORDER — IPRATROPIUM BROMIDE AND ALBUTEROL SULFATE 2.5; .5 MG/3ML; MG/3ML
3 SOLUTION RESPIRATORY (INHALATION)
Status: DISCONTINUED | OUTPATIENT
Start: 2023-01-20 | End: 2023-01-26 | Stop reason: HOSPADM

## 2023-01-19 RX ORDER — NICOTINE POLACRILEX 4 MG
15 LOZENGE BUCCAL
Status: DISCONTINUED | OUTPATIENT
Start: 2023-01-19 | End: 2023-01-26 | Stop reason: HOSPADM

## 2023-01-19 RX ORDER — HYDROCODONE BITARTRATE AND ACETAMINOPHEN 7.5; 325 MG/1; MG/1
1 TABLET ORAL 2 TIMES DAILY
Status: DISCONTINUED | OUTPATIENT
Start: 2023-01-20 | End: 2023-01-26 | Stop reason: HOSPADM

## 2023-01-19 RX ORDER — TRAZODONE HYDROCHLORIDE 100 MG/1
100 TABLET ORAL NIGHTLY
Status: DISCONTINUED | OUTPATIENT
Start: 2023-01-20 | End: 2023-01-26 | Stop reason: HOSPADM

## 2023-01-19 RX ORDER — ZINC SULFATE 50(220)MG
220 CAPSULE ORAL 2 TIMES DAILY
Status: DISCONTINUED | OUTPATIENT
Start: 2023-01-20 | End: 2023-01-26 | Stop reason: HOSPADM

## 2023-01-19 RX ORDER — LANOLIN ALCOHOL/MO/W.PET/CERES
3 CREAM (GRAM) TOPICAL NIGHTLY PRN
Status: DISCONTINUED | OUTPATIENT
Start: 2023-01-19 | End: 2023-01-26 | Stop reason: HOSPADM

## 2023-01-19 RX ORDER — PANTOPRAZOLE SODIUM 40 MG/1
40 TABLET, DELAYED RELEASE ORAL
Status: DISCONTINUED | OUTPATIENT
Start: 2023-01-20 | End: 2023-01-26 | Stop reason: HOSPADM

## 2023-01-19 RX ORDER — DONEPEZIL HYDROCHLORIDE 10 MG/1
10 TABLET, FILM COATED ORAL DAILY
Status: DISCONTINUED | OUTPATIENT
Start: 2023-01-20 | End: 2023-01-26 | Stop reason: HOSPADM

## 2023-01-19 RX ORDER — ASPIRIN 81 MG/1
81 TABLET, CHEWABLE ORAL DAILY
Status: DISCONTINUED | OUTPATIENT
Start: 2023-01-20 | End: 2023-01-26 | Stop reason: HOSPADM

## 2023-01-19 RX ORDER — ROSUVASTATIN CALCIUM 20 MG/1
40 TABLET, COATED ORAL NIGHTLY
Status: DISCONTINUED | OUTPATIENT
Start: 2023-01-20 | End: 2023-01-26 | Stop reason: HOSPADM

## 2023-01-19 RX ORDER — LOSARTAN POTASSIUM 50 MG/1
50 TABLET ORAL
Status: DISCONTINUED | OUTPATIENT
Start: 2023-01-20 | End: 2023-01-26 | Stop reason: HOSPADM

## 2023-01-19 RX ORDER — TERAZOSIN 2 MG/1
2 CAPSULE ORAL NIGHTLY
Status: DISCONTINUED | OUTPATIENT
Start: 2023-01-20 | End: 2023-01-26 | Stop reason: HOSPADM

## 2023-01-19 RX ORDER — TIMOLOL MALEATE 5 MG/ML
1 SOLUTION/ DROPS OPHTHALMIC 2 TIMES DAILY
Status: DISCONTINUED | OUTPATIENT
Start: 2023-01-20 | End: 2023-01-26 | Stop reason: HOSPADM

## 2023-01-19 RX ORDER — SODIUM CHLORIDE 0.9 % (FLUSH) 0.9 %
10 SYRINGE (ML) INJECTION AS NEEDED
Status: DISCONTINUED | OUTPATIENT
Start: 2023-01-19 | End: 2023-01-23 | Stop reason: SDUPTHER

## 2023-01-19 RX ORDER — INSULIN LISPRO 100 [IU]/ML
0-24 INJECTION, SOLUTION INTRAVENOUS; SUBCUTANEOUS
Status: DISCONTINUED | OUTPATIENT
Start: 2023-01-20 | End: 2023-01-26 | Stop reason: HOSPADM

## 2023-01-19 RX ORDER — SODIUM CHLORIDE 0.9 % (FLUSH) 0.9 %
10 SYRINGE (ML) INJECTION AS NEEDED
Status: DISCONTINUED | OUTPATIENT
Start: 2023-01-19 | End: 2023-01-26 | Stop reason: HOSPADM

## 2023-01-19 RX ORDER — ENOXAPARIN SODIUM 100 MG/ML
30 INJECTION SUBCUTANEOUS NIGHTLY
Status: DISCONTINUED | OUTPATIENT
Start: 2023-01-20 | End: 2023-01-20

## 2023-01-19 RX ORDER — ASCORBIC ACID 500 MG
1000 TABLET ORAL DAILY
Status: DISCONTINUED | OUTPATIENT
Start: 2023-01-20 | End: 2023-01-26 | Stop reason: HOSPADM

## 2023-01-19 RX ORDER — ACETAMINOPHEN 325 MG/1
650 TABLET ORAL EVERY 6 HOURS PRN
Status: DISCONTINUED | OUTPATIENT
Start: 2023-01-19 | End: 2023-01-26 | Stop reason: HOSPADM

## 2023-01-19 RX ADMIN — ONDANSETRON 4 MG: 2 INJECTION INTRAMUSCULAR; INTRAVENOUS at 23:25

## 2023-01-19 RX ADMIN — ROSUVASTATIN CALCIUM 40 MG: 20 TABLET, FILM COATED ORAL at 23:25

## 2023-01-19 RX ADMIN — ZINC SULFATE 220 MG (50 MG) CAPSULE 220 MG: CAPSULE at 23:27

## 2023-01-19 RX ADMIN — ENOXAPARIN SODIUM 30 MG: 100 INJECTION SUBCUTANEOUS at 23:25

## 2023-01-19 RX ADMIN — TIMOLOL MALEATE 1 DROP: 5 SOLUTION/ DROPS OPHTHALMIC at 23:25

## 2023-01-19 RX ADMIN — BUSPIRONE HYDROCHLORIDE 10 MG: 10 TABLET ORAL at 23:25

## 2023-01-19 RX ADMIN — IPRATROPIUM BROMIDE AND ALBUTEROL SULFATE 3 ML: 2.5; .5 SOLUTION RESPIRATORY (INHALATION) at 23:28

## 2023-01-19 RX ADMIN — TRAZODONE HYDROCHLORIDE 100 MG: 100 TABLET ORAL at 23:25

## 2023-01-19 RX ADMIN — BUMETANIDE 1 MG: 0.25 INJECTION, SOLUTION INTRAMUSCULAR; INTRAVENOUS at 23:25

## 2023-01-19 RX ADMIN — CARVEDILOL 6.25 MG: 6.25 TABLET, FILM COATED ORAL at 23:25

## 2023-01-19 RX ADMIN — HYDROCODONE BITARTRATE AND ACETAMINOPHEN 1 TABLET: 7.5; 325 TABLET ORAL at 23:25

## 2023-01-19 RX ADMIN — IOPAMIDOL 120 ML: 755 INJECTION, SOLUTION INTRAVENOUS at 20:58

## 2023-01-19 RX ADMIN — TERAZOSIN HYDROCHLORIDE 2 MG: 2 CAPSULE ORAL at 23:25

## 2023-01-20 PROBLEM — I50.33 ACUTE ON CHRONIC DIASTOLIC CHF (CONGESTIVE HEART FAILURE): Status: ACTIVE | Noted: 2023-01-20

## 2023-01-20 PROBLEM — N40.0 BPH WITHOUT OBSTRUCTION/LOWER URINARY TRACT SYMPTOMS: Status: ACTIVE | Noted: 2023-01-20

## 2023-01-20 PROBLEM — R07.9 ACUTE CHEST PAIN: Status: RESOLVED | Noted: 2023-01-19 | Resolved: 2023-01-20

## 2023-01-20 PROBLEM — K21.9 GERD WITHOUT ESOPHAGITIS: Status: ACTIVE | Noted: 2023-01-20

## 2023-01-20 PROBLEM — I25.10 CAD (CORONARY ARTERY DISEASE): Status: ACTIVE | Noted: 2023-01-20

## 2023-01-20 LAB
ANION GAP SERPL CALCULATED.3IONS-SCNC: 14 MMOL/L (ref 5–15)
BUN SERPL-MCNC: 29 MG/DL (ref 8–23)
BUN/CREAT SERPL: 16.6 (ref 7–25)
CALCIUM SPEC-SCNC: 8.9 MG/DL (ref 8.6–10.5)
CHLORIDE SERPL-SCNC: 104 MMOL/L (ref 98–107)
CO2 SERPL-SCNC: 23 MMOL/L (ref 22–29)
CREAT SERPL-MCNC: 1.75 MG/DL (ref 0.76–1.27)
EGFRCR SERPLBLD CKD-EPI 2021: 42.4 ML/MIN/1.73
GLUCOSE BLDC GLUCOMTR-MCNC: 180 MG/DL (ref 70–130)
GLUCOSE BLDC GLUCOMTR-MCNC: 221 MG/DL (ref 70–130)
GLUCOSE SERPL-MCNC: 110 MG/DL (ref 65–99)
POTASSIUM SERPL-SCNC: 3.9 MMOL/L (ref 3.5–5.2)
SODIUM SERPL-SCNC: 141 MMOL/L (ref 136–145)

## 2023-01-20 PROCEDURE — 82962 GLUCOSE BLOOD TEST: CPT

## 2023-01-20 PROCEDURE — 80048 BASIC METABOLIC PNL TOTAL CA: CPT | Performed by: FAMILY MEDICINE

## 2023-01-20 PROCEDURE — 94799 UNLISTED PULMONARY SVC/PX: CPT

## 2023-01-20 PROCEDURE — G0378 HOSPITAL OBSERVATION PER HR: HCPCS

## 2023-01-20 PROCEDURE — 99222 1ST HOSP IP/OBS MODERATE 55: CPT | Performed by: INTERNAL MEDICINE

## 2023-01-20 PROCEDURE — 94660 CPAP INITIATION&MGMT: CPT

## 2023-01-20 PROCEDURE — 63710000001 INSULIN LISPRO (HUMAN) PER 5 UNITS: Performed by: FAMILY MEDICINE

## 2023-01-20 PROCEDURE — 25010000002 ENOXAPARIN PER 10 MG: Performed by: INTERNAL MEDICINE

## 2023-01-20 PROCEDURE — 25010000002 MORPHINE PER 10 MG: Performed by: FAMILY MEDICINE

## 2023-01-20 PROCEDURE — 25010000002 ENOXAPARIN PER 10 MG: Performed by: FAMILY MEDICINE

## 2023-01-20 PROCEDURE — 25010000002 MORPHINE SULFATE (PF) 2 MG/ML SOLUTION: Performed by: FAMILY MEDICINE

## 2023-01-20 PROCEDURE — 94761 N-INVAS EAR/PLS OXIMETRY MLT: CPT

## 2023-01-20 RX ORDER — MORPHINE SULFATE 2 MG/ML
2 INJECTION, SOLUTION INTRAMUSCULAR; INTRAVENOUS EVERY 4 HOURS PRN
Status: DISCONTINUED | OUTPATIENT
Start: 2023-01-20 | End: 2023-01-23 | Stop reason: RX

## 2023-01-20 RX ORDER — SUCRALFATE 1 G/1
1 TABLET ORAL
Status: DISCONTINUED | OUTPATIENT
Start: 2023-01-20 | End: 2023-01-26 | Stop reason: HOSPADM

## 2023-01-20 RX ORDER — AMOXICILLIN 250 MG
1 CAPSULE ORAL 2 TIMES DAILY
Status: DISCONTINUED | OUTPATIENT
Start: 2023-01-20 | End: 2023-01-26 | Stop reason: HOSPADM

## 2023-01-20 RX ORDER — ALUMINA, MAGNESIA, AND SIMETHICONE 2400; 2400; 240 MG/30ML; MG/30ML; MG/30ML
15 SUSPENSION ORAL EVERY 6 HOURS PRN
Status: DISCONTINUED | OUTPATIENT
Start: 2023-01-20 | End: 2023-01-26 | Stop reason: HOSPADM

## 2023-01-20 RX ORDER — POLYETHYLENE GLYCOL 3350 17 G/17G
17 POWDER, FOR SOLUTION ORAL DAILY
Status: DISCONTINUED | OUTPATIENT
Start: 2023-01-20 | End: 2023-01-26 | Stop reason: HOSPADM

## 2023-01-20 RX ORDER — ENOXAPARIN SODIUM 100 MG/ML
40 INJECTION SUBCUTANEOUS NIGHTLY
Status: DISCONTINUED | OUTPATIENT
Start: 2023-01-20 | End: 2023-01-26 | Stop reason: HOSPADM

## 2023-01-20 RX ORDER — NITROGLYCERIN 0.4 MG/1
0.4 TABLET SUBLINGUAL
Status: DISCONTINUED | OUTPATIENT
Start: 2023-01-20 | End: 2023-01-26 | Stop reason: HOSPADM

## 2023-01-20 RX ADMIN — PANTOPRAZOLE SODIUM 40 MG: 40 TABLET, DELAYED RELEASE ORAL at 09:35

## 2023-01-20 RX ADMIN — IPRATROPIUM BROMIDE AND ALBUTEROL SULFATE 3 ML: 2.5; .5 SOLUTION RESPIRATORY (INHALATION) at 14:38

## 2023-01-20 RX ADMIN — Medication 3 MG: at 20:54

## 2023-01-20 RX ADMIN — BUSPIRONE HYDROCHLORIDE 10 MG: 10 TABLET ORAL at 06:01

## 2023-01-20 RX ADMIN — IPRATROPIUM BROMIDE AND ALBUTEROL SULFATE 3 ML: 2.5; .5 SOLUTION RESPIRATORY (INHALATION) at 18:24

## 2023-01-20 RX ADMIN — BUMETANIDE 1 MG: 0.25 INJECTION, SOLUTION INTRAMUSCULAR; INTRAVENOUS at 23:45

## 2023-01-20 RX ADMIN — SUCRALFATE 1 G: 1 TABLET ORAL at 12:25

## 2023-01-20 RX ADMIN — SUCRALFATE 1 G: 1 TABLET ORAL at 09:28

## 2023-01-20 RX ADMIN — BUSPIRONE HYDROCHLORIDE 10 MG: 10 TABLET ORAL at 17:27

## 2023-01-20 RX ADMIN — DOCUSATE SODIUM 50 MG AND SENNOSIDES 8.6 MG 1 TABLET: 8.6; 5 TABLET, FILM COATED ORAL at 20:55

## 2023-01-20 RX ADMIN — ZINC SULFATE 220 MG (50 MG) CAPSULE 220 MG: CAPSULE at 20:55

## 2023-01-20 RX ADMIN — IPRATROPIUM BROMIDE AND ALBUTEROL SULFATE 3 ML: 2.5; .5 SOLUTION RESPIRATORY (INHALATION) at 06:18

## 2023-01-20 RX ADMIN — PANTOPRAZOLE SODIUM 40 MG: 40 TABLET, DELAYED RELEASE ORAL at 17:27

## 2023-01-20 RX ADMIN — MORPHINE SULFATE 2 MG: 2 INJECTION, SOLUTION INTRAMUSCULAR; INTRAVENOUS at 04:13

## 2023-01-20 RX ADMIN — SUCRALFATE 1 G: 1 TABLET ORAL at 17:27

## 2023-01-20 RX ADMIN — IPRATROPIUM BROMIDE AND ALBUTEROL SULFATE 3 ML: 2.5; .5 SOLUTION RESPIRATORY (INHALATION) at 10:27

## 2023-01-20 RX ADMIN — ZINC SULFATE 220 MG (50 MG) CAPSULE 220 MG: CAPSULE at 09:44

## 2023-01-20 RX ADMIN — BUSPIRONE HYDROCHLORIDE 10 MG: 10 TABLET ORAL at 21:00

## 2023-01-20 RX ADMIN — OXYCODONE HYDROCHLORIDE AND ACETAMINOPHEN 1000 MG: 500 TABLET ORAL at 09:29

## 2023-01-20 RX ADMIN — PREGABALIN 50 MG: 50 CAPSULE ORAL at 20:55

## 2023-01-20 RX ADMIN — DULOXETINE HYDROCHLORIDE 60 MG: 30 CAPSULE, DELAYED RELEASE ORAL at 09:27

## 2023-01-20 RX ADMIN — ROSUVASTATIN CALCIUM 40 MG: 20 TABLET, FILM COATED ORAL at 20:55

## 2023-01-20 RX ADMIN — LOSARTAN POTASSIUM 50 MG: 50 TABLET, FILM COATED ORAL at 09:29

## 2023-01-20 RX ADMIN — CARVEDILOL 6.25 MG: 6.25 TABLET, FILM COATED ORAL at 20:55

## 2023-01-20 RX ADMIN — TRAZODONE HYDROCHLORIDE 100 MG: 100 TABLET ORAL at 20:55

## 2023-01-20 RX ADMIN — PREGABALIN 50 MG: 50 CAPSULE ORAL at 17:27

## 2023-01-20 RX ADMIN — INSULIN LISPRO 8 UNITS: 100 INJECTION, SOLUTION INTRAVENOUS; SUBCUTANEOUS at 17:27

## 2023-01-20 RX ADMIN — ALUMINUM HYDROXIDE, MAGNESIUM HYDROXIDE, AND DIMETHICONE 15 ML: 400; 400; 40 SUSPENSION ORAL at 06:01

## 2023-01-20 RX ADMIN — ENOXAPARIN SODIUM 40 MG: 100 INJECTION SUBCUTANEOUS at 20:55

## 2023-01-20 RX ADMIN — DONEPEZIL HYDROCHLORIDE 10 MG: 10 TABLET, FILM COATED ORAL at 09:29

## 2023-01-20 RX ADMIN — TIMOLOL MALEATE 1 DROP: 5 SOLUTION/ DROPS OPHTHALMIC at 10:13

## 2023-01-20 RX ADMIN — POLYETHYLENE GLYCOL 3350 17 G: 17 POWDER, FOR SOLUTION ORAL at 20:56

## 2023-01-20 RX ADMIN — CYCLOBENZAPRINE 10 MG: 10 TABLET, FILM COATED ORAL at 20:54

## 2023-01-20 RX ADMIN — PREGABALIN 50 MG: 50 CAPSULE ORAL at 09:35

## 2023-01-20 RX ADMIN — CARVEDILOL 6.25 MG: 6.25 TABLET, FILM COATED ORAL at 09:27

## 2023-01-20 RX ADMIN — ASPIRIN 81 MG: 81 TABLET, CHEWABLE ORAL at 09:35

## 2023-01-20 RX ADMIN — SUCRALFATE 1 G: 1 TABLET ORAL at 20:55

## 2023-01-20 RX ADMIN — MORPHINE SULFATE 2 MG: 2 INJECTION, SOLUTION INTRAMUSCULAR; INTRAVENOUS at 20:55

## 2023-01-20 RX ADMIN — BUMETANIDE 1 MG: 0.25 INJECTION, SOLUTION INTRAMUSCULAR; INTRAVENOUS at 12:24

## 2023-01-20 RX ADMIN — SUCRALFATE 1 G: 1 TABLET ORAL at 02:44

## 2023-01-20 RX ADMIN — HYDROCODONE BITARTRATE AND ACETAMINOPHEN 1 TABLET: 7.5; 325 TABLET ORAL at 20:55

## 2023-01-20 RX ADMIN — HYDROCODONE BITARTRATE AND ACETAMINOPHEN 1 TABLET: 7.5; 325 TABLET ORAL at 09:35

## 2023-01-20 RX ADMIN — TERAZOSIN HYDROCHLORIDE 2 MG: 2 CAPSULE ORAL at 21:44

## 2023-01-20 RX ADMIN — TIMOLOL MALEATE 1 DROP: 5 SOLUTION/ DROPS OPHTHALMIC at 20:55

## 2023-01-21 ENCOUNTER — APPOINTMENT (OUTPATIENT)
Dept: CARDIOLOGY | Facility: HOSPITAL | Age: 68
DRG: 291 | End: 2023-01-21
Payer: COMMERCIAL

## 2023-01-21 LAB
25(OH)D3 SERPL-MCNC: 14.5 NG/ML (ref 30–100)
ANION GAP SERPL CALCULATED.3IONS-SCNC: 13 MMOL/L (ref 5–15)
BH CV ECHO MEAS - AO MAX PG: 10.9 MMHG
BH CV ECHO MEAS - AO MEAN PG: 6 MMHG
BH CV ECHO MEAS - AO ROOT DIAM: 3.6 CM
BH CV ECHO MEAS - AO V2 MAX: 165 CM/SEC
BH CV ECHO MEAS - AO V2 VTI: 33.1 CM
BH CV ECHO MEAS - AVA(I,D): 2.7 CM2
BH CV ECHO MEAS - EDV(CUBED): 110.6 ML
BH CV ECHO MEAS - EDV(MOD-SP4): 200 ML
BH CV ECHO MEAS - EF(MOD-SP4): 53.4 %
BH CV ECHO MEAS - ESV(CUBED): 42.9 ML
BH CV ECHO MEAS - ESV(MOD-SP4): 93.3 ML
BH CV ECHO MEAS - FS: 27.1 %
BH CV ECHO MEAS - IVS/LVPW: 1.07 CM
BH CV ECHO MEAS - IVSD: 1.5 CM
BH CV ECHO MEAS - LA DIMENSION: 4.2 CM
BH CV ECHO MEAS - LAT PEAK E' VEL: 14.3 CM/SEC
BH CV ECHO MEAS - LV DIASTOLIC VOL/BSA (35-75): 74.8 CM2
BH CV ECHO MEAS - LV MASS(C)D: 288.4 GRAMS
BH CV ECHO MEAS - LV MAX PG: 4 MMHG
BH CV ECHO MEAS - LV MEAN PG: 2 MMHG
BH CV ECHO MEAS - LV SYSTOLIC VOL/BSA (12-30): 34.9 CM2
BH CV ECHO MEAS - LV V1 MAX: 100 CM/SEC
BH CV ECHO MEAS - LV V1 VTI: 19.8 CM
BH CV ECHO MEAS - LVIDD: 4.8 CM
BH CV ECHO MEAS - LVIDS: 3.5 CM
BH CV ECHO MEAS - LVOT AREA: 4.5 CM2
BH CV ECHO MEAS - LVOT DIAM: 2.4 CM
BH CV ECHO MEAS - LVPWD: 1.4 CM
BH CV ECHO MEAS - MED PEAK E' VEL: 13.5 CM/SEC
BH CV ECHO MEAS - MV DEC TIME: 0.14 MSEC
BH CV ECHO MEAS - MV E MAX VEL: 158 CM/SEC
BH CV ECHO MEAS - SI(MOD-SP4): 39.9 ML/M2
BH CV ECHO MEAS - SV(LVOT): 89.6 ML
BH CV ECHO MEAS - SV(MOD-SP4): 106.7 ML
BH CV ECHO MEASUREMENTS AVERAGE E/E' RATIO: 11.37
BUN SERPL-MCNC: 29 MG/DL (ref 8–23)
BUN/CREAT SERPL: 16.4 (ref 7–25)
CALCIUM SPEC-SCNC: 8.8 MG/DL (ref 8.6–10.5)
CHLORIDE SERPL-SCNC: 101 MMOL/L (ref 98–107)
CO2 SERPL-SCNC: 25 MMOL/L (ref 22–29)
CREAT SERPL-MCNC: 1.77 MG/DL (ref 0.76–1.27)
DEPRECATED RDW RBC AUTO: 45.1 FL (ref 37–54)
EGFRCR SERPLBLD CKD-EPI 2021: 41.8 ML/MIN/1.73
ERYTHROCYTE [DISTWIDTH] IN BLOOD BY AUTOMATED COUNT: 14 % (ref 12.3–15.4)
GLUCOSE BLDC GLUCOMTR-MCNC: 316 MG/DL (ref 70–130)
GLUCOSE BLDC GLUCOMTR-MCNC: 355 MG/DL (ref 70–130)
GLUCOSE SERPL-MCNC: 287 MG/DL (ref 65–99)
HCT VFR BLD AUTO: 37.3 % (ref 37.5–51)
HGB BLD-MCNC: 12 G/DL (ref 13–17.7)
LEFT ATRIUM VOLUME INDEX: 29.8 ML/M2
LEFT ATRIUM VOLUME: 79.5 ML
MAGNESIUM SERPL-MCNC: 2.1 MG/DL (ref 1.6–2.4)
MAXIMAL PREDICTED HEART RATE: 154 BPM
MCH RBC QN AUTO: 28.2 PG (ref 26.6–33)
MCHC RBC AUTO-ENTMCNC: 32.2 G/DL (ref 31.5–35.7)
MCV RBC AUTO: 87.8 FL (ref 79–97)
PHOSPHATE SERPL-MCNC: 2.9 MG/DL (ref 2.5–4.5)
PLATELET # BLD AUTO: 196 10*3/MM3 (ref 140–450)
PMV BLD AUTO: 11.8 FL (ref 6–12)
POTASSIUM SERPL-SCNC: 4.5 MMOL/L (ref 3.5–5.2)
PTH-INTACT SERPL-MCNC: 118.2 PG/ML (ref 15–65)
RBC # BLD AUTO: 4.25 10*6/MM3 (ref 4.14–5.8)
SODIUM SERPL-SCNC: 139 MMOL/L (ref 136–145)
STRESS TARGET HR: 131 BPM
WBC NRBC COR # BLD: 5.35 10*3/MM3 (ref 3.4–10.8)

## 2023-01-21 PROCEDURE — 25010000002 PERFLUTREN 6.52 MG/ML SUSPENSION: Performed by: INTERNAL MEDICINE

## 2023-01-21 PROCEDURE — 82306 VITAMIN D 25 HYDROXY: CPT | Performed by: INTERNAL MEDICINE

## 2023-01-21 PROCEDURE — 93306 TTE W/DOPPLER COMPLETE: CPT

## 2023-01-21 PROCEDURE — 83735 ASSAY OF MAGNESIUM: CPT | Performed by: INTERNAL MEDICINE

## 2023-01-21 PROCEDURE — 84100 ASSAY OF PHOSPHORUS: CPT | Performed by: INTERNAL MEDICINE

## 2023-01-21 PROCEDURE — 94799 UNLISTED PULMONARY SVC/PX: CPT

## 2023-01-21 PROCEDURE — 80048 BASIC METABOLIC PNL TOTAL CA: CPT | Performed by: FAMILY MEDICINE

## 2023-01-21 PROCEDURE — 63710000001 INSULIN LISPRO (HUMAN) PER 5 UNITS: Performed by: FAMILY MEDICINE

## 2023-01-21 PROCEDURE — G0378 HOSPITAL OBSERVATION PER HR: HCPCS

## 2023-01-21 PROCEDURE — 82962 GLUCOSE BLOOD TEST: CPT

## 2023-01-21 PROCEDURE — 83970 ASSAY OF PARATHORMONE: CPT | Performed by: INTERNAL MEDICINE

## 2023-01-21 PROCEDURE — 25010000002 ENOXAPARIN PER 10 MG: Performed by: INTERNAL MEDICINE

## 2023-01-21 PROCEDURE — 85027 COMPLETE CBC AUTOMATED: CPT | Performed by: NURSE PRACTITIONER

## 2023-01-21 PROCEDURE — 93306 TTE W/DOPPLER COMPLETE: CPT | Performed by: INTERNAL MEDICINE

## 2023-01-21 RX ORDER — MELATONIN
2000 DAILY
Status: DISCONTINUED | OUTPATIENT
Start: 2023-01-21 | End: 2023-01-26 | Stop reason: HOSPADM

## 2023-01-21 RX ADMIN — IPRATROPIUM BROMIDE AND ALBUTEROL SULFATE 3 ML: 2.5; .5 SOLUTION RESPIRATORY (INHALATION) at 06:09

## 2023-01-21 RX ADMIN — BUSPIRONE HYDROCHLORIDE 10 MG: 10 TABLET ORAL at 22:24

## 2023-01-21 RX ADMIN — INSULIN LISPRO 20 UNITS: 100 INJECTION, SOLUTION INTRAVENOUS; SUBCUTANEOUS at 12:56

## 2023-01-21 RX ADMIN — PREGABALIN 50 MG: 50 CAPSULE ORAL at 22:25

## 2023-01-21 RX ADMIN — PREGABALIN 50 MG: 50 CAPSULE ORAL at 17:45

## 2023-01-21 RX ADMIN — OXYCODONE HYDROCHLORIDE AND ACETAMINOPHEN 1000 MG: 500 TABLET ORAL at 08:57

## 2023-01-21 RX ADMIN — POLYETHYLENE GLYCOL 3350 17 G: 17 POWDER, FOR SOLUTION ORAL at 08:56

## 2023-01-21 RX ADMIN — HYDROCODONE BITARTRATE AND ACETAMINOPHEN 1 TABLET: 7.5; 325 TABLET ORAL at 08:56

## 2023-01-21 RX ADMIN — BUSPIRONE HYDROCHLORIDE 10 MG: 10 TABLET ORAL at 05:10

## 2023-01-21 RX ADMIN — PANTOPRAZOLE SODIUM 40 MG: 40 TABLET, DELAYED RELEASE ORAL at 08:57

## 2023-01-21 RX ADMIN — PREGABALIN 50 MG: 50 CAPSULE ORAL at 08:56

## 2023-01-21 RX ADMIN — DULOXETINE HYDROCHLORIDE 60 MG: 30 CAPSULE, DELAYED RELEASE ORAL at 08:57

## 2023-01-21 RX ADMIN — HYDROCODONE BITARTRATE AND ACETAMINOPHEN 1 TABLET: 7.5; 325 TABLET ORAL at 22:25

## 2023-01-21 RX ADMIN — IPRATROPIUM BROMIDE AND ALBUTEROL SULFATE 3 ML: 2.5; .5 SOLUTION RESPIRATORY (INHALATION) at 20:00

## 2023-01-21 RX ADMIN — SUCRALFATE 1 G: 1 TABLET ORAL at 12:56

## 2023-01-21 RX ADMIN — BUMETANIDE 1 MG: 0.25 INJECTION, SOLUTION INTRAMUSCULAR; INTRAVENOUS at 12:57

## 2023-01-21 RX ADMIN — TIMOLOL MALEATE 1 DROP: 5 SOLUTION/ DROPS OPHTHALMIC at 08:57

## 2023-01-21 RX ADMIN — BUSPIRONE HYDROCHLORIDE 10 MG: 10 TABLET ORAL at 13:30

## 2023-01-21 RX ADMIN — CARVEDILOL 6.25 MG: 6.25 TABLET, FILM COATED ORAL at 08:57

## 2023-01-21 RX ADMIN — DONEPEZIL HYDROCHLORIDE 10 MG: 10 TABLET, FILM COATED ORAL at 08:56

## 2023-01-21 RX ADMIN — ASPIRIN 81 MG: 81 TABLET, CHEWABLE ORAL at 08:56

## 2023-01-21 RX ADMIN — IPRATROPIUM BROMIDE AND ALBUTEROL SULFATE 3 ML: 2.5; .5 SOLUTION RESPIRATORY (INHALATION) at 10:14

## 2023-01-21 RX ADMIN — TIMOLOL MALEATE 1 DROP: 5 SOLUTION/ DROPS OPHTHALMIC at 22:25

## 2023-01-21 RX ADMIN — LOSARTAN POTASSIUM 50 MG: 50 TABLET, FILM COATED ORAL at 08:57

## 2023-01-21 RX ADMIN — INSULIN LISPRO 16 UNITS: 100 INJECTION, SOLUTION INTRAVENOUS; SUBCUTANEOUS at 17:45

## 2023-01-21 RX ADMIN — ZINC SULFATE 220 MG (50 MG) CAPSULE 220 MG: CAPSULE at 22:25

## 2023-01-21 RX ADMIN — SUCRALFATE 1 G: 1 TABLET ORAL at 17:45

## 2023-01-21 RX ADMIN — ENOXAPARIN SODIUM 40 MG: 100 INJECTION SUBCUTANEOUS at 22:23

## 2023-01-21 RX ADMIN — TERAZOSIN HYDROCHLORIDE 2 MG: 2 CAPSULE ORAL at 22:25

## 2023-01-21 RX ADMIN — DOCUSATE SODIUM 50 MG AND SENNOSIDES 8.6 MG 1 TABLET: 8.6; 5 TABLET, FILM COATED ORAL at 08:57

## 2023-01-21 RX ADMIN — SUCRALFATE 1 G: 1 TABLET ORAL at 22:24

## 2023-01-21 RX ADMIN — IPRATROPIUM BROMIDE AND ALBUTEROL SULFATE 3 ML: 2.5; .5 SOLUTION RESPIRATORY (INHALATION) at 14:25

## 2023-01-21 RX ADMIN — CARVEDILOL 6.25 MG: 6.25 TABLET, FILM COATED ORAL at 22:25

## 2023-01-21 RX ADMIN — Medication 2000 UNITS: at 17:45

## 2023-01-21 RX ADMIN — TRAZODONE HYDROCHLORIDE 100 MG: 100 TABLET ORAL at 22:24

## 2023-01-21 RX ADMIN — PERFLUTREN 8.48 MG: 6.52 INJECTION, SUSPENSION INTRAVENOUS at 09:31

## 2023-01-21 RX ADMIN — ROSUVASTATIN CALCIUM 40 MG: 20 TABLET, FILM COATED ORAL at 22:24

## 2023-01-21 RX ADMIN — PANTOPRAZOLE SODIUM 40 MG: 40 TABLET, DELAYED RELEASE ORAL at 17:45

## 2023-01-21 RX ADMIN — ZINC SULFATE 220 MG (50 MG) CAPSULE 220 MG: CAPSULE at 09:01

## 2023-01-21 RX ADMIN — DOCUSATE SODIUM 50 MG AND SENNOSIDES 8.6 MG 1 TABLET: 8.6; 5 TABLET, FILM COATED ORAL at 22:24

## 2023-01-21 RX ADMIN — SUCRALFATE 1 G: 1 TABLET ORAL at 08:57

## 2023-01-22 LAB
ANION GAP SERPL CALCULATED.3IONS-SCNC: 14 MMOL/L (ref 5–15)
BUN SERPL-MCNC: 30 MG/DL (ref 8–23)
BUN/CREAT SERPL: 15.6 (ref 7–25)
CALCIUM SPEC-SCNC: 8.6 MG/DL (ref 8.6–10.5)
CHLORIDE SERPL-SCNC: 100 MMOL/L (ref 98–107)
CO2 SERPL-SCNC: 26 MMOL/L (ref 22–29)
CREAT SERPL-MCNC: 1.92 MG/DL (ref 0.76–1.27)
EGFRCR SERPLBLD CKD-EPI 2021: 37.9 ML/MIN/1.73
GLUCOSE BLDC GLUCOMTR-MCNC: 215 MG/DL (ref 70–130)
GLUCOSE BLDC GLUCOMTR-MCNC: 292 MG/DL (ref 70–130)
GLUCOSE BLDC GLUCOMTR-MCNC: 338 MG/DL (ref 70–130)
GLUCOSE BLDC GLUCOMTR-MCNC: 360 MG/DL (ref 70–130)
GLUCOSE SERPL-MCNC: 292 MG/DL (ref 65–99)
POTASSIUM SERPL-SCNC: 4.3 MMOL/L (ref 3.5–5.2)
QT INTERVAL: 346 MS
QTC INTERVAL: 432 MS
SODIUM SERPL-SCNC: 140 MMOL/L (ref 136–145)

## 2023-01-22 PROCEDURE — 94799 UNLISTED PULMONARY SVC/PX: CPT

## 2023-01-22 PROCEDURE — 63710000001 INSULIN LISPRO (HUMAN) PER 5 UNITS: Performed by: FAMILY MEDICINE

## 2023-01-22 PROCEDURE — 80048 BASIC METABOLIC PNL TOTAL CA: CPT | Performed by: FAMILY MEDICINE

## 2023-01-22 PROCEDURE — 25010000002 ENOXAPARIN PER 10 MG: Performed by: INTERNAL MEDICINE

## 2023-01-22 PROCEDURE — 94664 DEMO&/EVAL PT USE INHALER: CPT

## 2023-01-22 PROCEDURE — 82962 GLUCOSE BLOOD TEST: CPT

## 2023-01-22 PROCEDURE — G0378 HOSPITAL OBSERVATION PER HR: HCPCS

## 2023-01-22 RX ORDER — BUMETANIDE 0.25 MG/ML
1 INJECTION INTRAMUSCULAR; INTRAVENOUS ONCE
Status: DISCONTINUED | OUTPATIENT
Start: 2023-01-22 | End: 2023-01-22

## 2023-01-22 RX ORDER — BUMETANIDE 0.25 MG/ML
2 INJECTION INTRAMUSCULAR; INTRAVENOUS
Status: DISCONTINUED | OUTPATIENT
Start: 2023-01-22 | End: 2023-01-24

## 2023-01-22 RX ORDER — BUMETANIDE 0.25 MG/ML
2 INJECTION INTRAMUSCULAR; INTRAVENOUS EVERY 12 HOURS
Status: DISCONTINUED | OUTPATIENT
Start: 2023-01-23 | End: 2023-01-22

## 2023-01-22 RX ADMIN — INSULIN LISPRO 8 UNITS: 100 INJECTION, SOLUTION INTRAVENOUS; SUBCUTANEOUS at 18:01

## 2023-01-22 RX ADMIN — BUSPIRONE HYDROCHLORIDE 10 MG: 10 TABLET ORAL at 15:00

## 2023-01-22 RX ADMIN — IPRATROPIUM BROMIDE AND ALBUTEROL SULFATE 3 ML: 2.5; .5 SOLUTION RESPIRATORY (INHALATION) at 06:02

## 2023-01-22 RX ADMIN — DONEPEZIL HYDROCHLORIDE 10 MG: 10 TABLET, FILM COATED ORAL at 08:44

## 2023-01-22 RX ADMIN — ZINC SULFATE 220 MG (50 MG) CAPSULE 220 MG: CAPSULE at 08:42

## 2023-01-22 RX ADMIN — HYDROCODONE BITARTRATE AND ACETAMINOPHEN 1 TABLET: 7.5; 325 TABLET ORAL at 08:43

## 2023-01-22 RX ADMIN — ASPIRIN 81 MG: 81 TABLET, CHEWABLE ORAL at 08:42

## 2023-01-22 RX ADMIN — PREGABALIN 50 MG: 50 CAPSULE ORAL at 08:43

## 2023-01-22 RX ADMIN — CARVEDILOL 6.25 MG: 6.25 TABLET, FILM COATED ORAL at 20:47

## 2023-01-22 RX ADMIN — INSULIN LISPRO 20 UNITS: 100 INJECTION, SOLUTION INTRAVENOUS; SUBCUTANEOUS at 12:03

## 2023-01-22 RX ADMIN — ENOXAPARIN SODIUM 40 MG: 100 INJECTION SUBCUTANEOUS at 20:47

## 2023-01-22 RX ADMIN — DULOXETINE HYDROCHLORIDE 60 MG: 30 CAPSULE, DELAYED RELEASE ORAL at 08:43

## 2023-01-22 RX ADMIN — INSULIN LISPRO 12 UNITS: 100 INJECTION, SOLUTION INTRAVENOUS; SUBCUTANEOUS at 08:44

## 2023-01-22 RX ADMIN — PREGABALIN 50 MG: 50 CAPSULE ORAL at 15:04

## 2023-01-22 RX ADMIN — BUMETANIDE 1 MG: 0.25 INJECTION, SOLUTION INTRAMUSCULAR; INTRAVENOUS at 00:48

## 2023-01-22 RX ADMIN — BUSPIRONE HYDROCHLORIDE 10 MG: 10 TABLET ORAL at 06:04

## 2023-01-22 RX ADMIN — PANTOPRAZOLE SODIUM 40 MG: 40 TABLET, DELAYED RELEASE ORAL at 18:00

## 2023-01-22 RX ADMIN — DOCUSATE SODIUM 50 MG AND SENNOSIDES 8.6 MG 1 TABLET: 8.6; 5 TABLET, FILM COATED ORAL at 08:44

## 2023-01-22 RX ADMIN — OXYCODONE HYDROCHLORIDE AND ACETAMINOPHEN 1000 MG: 500 TABLET ORAL at 08:43

## 2023-01-22 RX ADMIN — ZINC SULFATE 220 MG (50 MG) CAPSULE 220 MG: CAPSULE at 20:50

## 2023-01-22 RX ADMIN — SUCRALFATE 1 G: 1 TABLET ORAL at 18:00

## 2023-01-22 RX ADMIN — BUMETANIDE 1 MG: 0.25 INJECTION, SOLUTION INTRAMUSCULAR; INTRAVENOUS at 12:04

## 2023-01-22 RX ADMIN — LOSARTAN POTASSIUM 50 MG: 50 TABLET, FILM COATED ORAL at 08:43

## 2023-01-22 RX ADMIN — SUCRALFATE 1 G: 1 TABLET ORAL at 08:43

## 2023-01-22 RX ADMIN — DOCUSATE SODIUM 50 MG AND SENNOSIDES 8.6 MG 1 TABLET: 8.6; 5 TABLET, FILM COATED ORAL at 20:47

## 2023-01-22 RX ADMIN — SUCRALFATE 1 G: 1 TABLET ORAL at 12:04

## 2023-01-22 RX ADMIN — PREGABALIN 50 MG: 50 CAPSULE ORAL at 20:47

## 2023-01-22 RX ADMIN — BUSPIRONE HYDROCHLORIDE 10 MG: 10 TABLET ORAL at 21:37

## 2023-01-22 RX ADMIN — CARVEDILOL 6.25 MG: 6.25 TABLET, FILM COATED ORAL at 08:43

## 2023-01-22 RX ADMIN — TIMOLOL MALEATE 1 DROP: 5 SOLUTION/ DROPS OPHTHALMIC at 20:47

## 2023-01-22 RX ADMIN — POLYETHYLENE GLYCOL 3350 17 G: 17 POWDER, FOR SOLUTION ORAL at 08:43

## 2023-01-22 RX ADMIN — TRAZODONE HYDROCHLORIDE 100 MG: 100 TABLET ORAL at 20:47

## 2023-01-22 RX ADMIN — IPRATROPIUM BROMIDE AND ALBUTEROL SULFATE 3 ML: 2.5; .5 SOLUTION RESPIRATORY (INHALATION) at 14:06

## 2023-01-22 RX ADMIN — PANTOPRAZOLE SODIUM 40 MG: 40 TABLET, DELAYED RELEASE ORAL at 08:42

## 2023-01-22 RX ADMIN — HYDROCODONE BITARTRATE AND ACETAMINOPHEN 1 TABLET: 7.5; 325 TABLET ORAL at 20:47

## 2023-01-22 RX ADMIN — ROSUVASTATIN CALCIUM 40 MG: 20 TABLET, FILM COATED ORAL at 20:47

## 2023-01-22 RX ADMIN — TIMOLOL MALEATE 1 DROP: 5 SOLUTION/ DROPS OPHTHALMIC at 08:43

## 2023-01-22 RX ADMIN — Medication 2000 UNITS: at 08:42

## 2023-01-22 RX ADMIN — BUMETANIDE 2 MG: 0.25 INJECTION INTRAMUSCULAR; INTRAVENOUS at 18:01

## 2023-01-22 RX ADMIN — TERAZOSIN HYDROCHLORIDE 2 MG: 2 CAPSULE ORAL at 20:47

## 2023-01-22 RX ADMIN — IPRATROPIUM BROMIDE AND ALBUTEROL SULFATE 3 ML: 2.5; .5 SOLUTION RESPIRATORY (INHALATION) at 10:18

## 2023-01-22 RX ADMIN — IPRATROPIUM BROMIDE AND ALBUTEROL SULFATE 3 ML: 2.5; .5 SOLUTION RESPIRATORY (INHALATION) at 20:08

## 2023-01-22 RX ADMIN — SUCRALFATE 1 G: 1 TABLET ORAL at 20:47

## 2023-01-23 PROBLEM — R07.9 ACUTE CHEST PAIN: Status: ACTIVE | Noted: 2023-01-23

## 2023-01-23 LAB
ANION GAP SERPL CALCULATED.3IONS-SCNC: 12 MMOL/L (ref 5–15)
BUN SERPL-MCNC: 38 MG/DL (ref 8–23)
BUN/CREAT SERPL: 17.3 (ref 7–25)
CALCIUM SPEC-SCNC: 8.4 MG/DL (ref 8.6–10.5)
CHLORIDE SERPL-SCNC: 100 MMOL/L (ref 98–107)
CO2 SERPL-SCNC: 23 MMOL/L (ref 22–29)
CREAT SERPL-MCNC: 2.2 MG/DL (ref 0.76–1.27)
EGFRCR SERPLBLD CKD-EPI 2021: 32.2 ML/MIN/1.73
GLUCOSE BLDC GLUCOMTR-MCNC: 232 MG/DL (ref 70–130)
GLUCOSE BLDC GLUCOMTR-MCNC: 237 MG/DL (ref 70–130)
GLUCOSE BLDC GLUCOMTR-MCNC: 279 MG/DL (ref 70–130)
GLUCOSE BLDC GLUCOMTR-MCNC: 355 MG/DL (ref 70–130)
GLUCOSE SERPL-MCNC: 300 MG/DL (ref 65–99)
POTASSIUM SERPL-SCNC: 4.4 MMOL/L (ref 3.5–5.2)
SODIUM SERPL-SCNC: 135 MMOL/L (ref 136–145)

## 2023-01-23 PROCEDURE — 63710000001 INSULIN LISPRO (HUMAN) PER 5 UNITS: Performed by: FAMILY MEDICINE

## 2023-01-23 PROCEDURE — 82962 GLUCOSE BLOOD TEST: CPT

## 2023-01-23 PROCEDURE — 63710000001 INSULIN DETEMIR PER 5 UNITS: Performed by: NURSE PRACTITIONER

## 2023-01-23 PROCEDURE — 25010000002 ENOXAPARIN PER 10 MG: Performed by: INTERNAL MEDICINE

## 2023-01-23 PROCEDURE — 94664 DEMO&/EVAL PT USE INHALER: CPT

## 2023-01-23 PROCEDURE — 94799 UNLISTED PULMONARY SVC/PX: CPT

## 2023-01-23 PROCEDURE — 94760 N-INVAS EAR/PLS OXIMETRY 1: CPT

## 2023-01-23 PROCEDURE — 80048 BASIC METABOLIC PNL TOTAL CA: CPT | Performed by: FAMILY MEDICINE

## 2023-01-23 RX ORDER — CALCITRIOL 0.25 UG/1
0.25 CAPSULE, LIQUID FILLED ORAL DAILY
Status: DISCONTINUED | OUTPATIENT
Start: 2023-01-23 | End: 2023-01-26 | Stop reason: HOSPADM

## 2023-01-23 RX ADMIN — BUSPIRONE HYDROCHLORIDE 10 MG: 10 TABLET ORAL at 12:48

## 2023-01-23 RX ADMIN — Medication 2000 UNITS: at 08:47

## 2023-01-23 RX ADMIN — POLYETHYLENE GLYCOL 3350 17 G: 17 POWDER, FOR SOLUTION ORAL at 08:47

## 2023-01-23 RX ADMIN — BUSPIRONE HYDROCHLORIDE 10 MG: 10 TABLET ORAL at 06:32

## 2023-01-23 RX ADMIN — INSULIN LISPRO 12 UNITS: 100 INJECTION, SOLUTION INTRAVENOUS; SUBCUTANEOUS at 08:47

## 2023-01-23 RX ADMIN — HYDROCODONE BITARTRATE AND ACETAMINOPHEN 1 TABLET: 7.5; 325 TABLET ORAL at 08:48

## 2023-01-23 RX ADMIN — HYDROCODONE BITARTRATE AND ACETAMINOPHEN 1 TABLET: 7.5; 325 TABLET ORAL at 21:33

## 2023-01-23 RX ADMIN — IPRATROPIUM BROMIDE AND ALBUTEROL SULFATE 3 ML: 2.5; .5 SOLUTION RESPIRATORY (INHALATION) at 10:16

## 2023-01-23 RX ADMIN — INSULIN LISPRO 8 UNITS: 100 INJECTION, SOLUTION INTRAVENOUS; SUBCUTANEOUS at 17:25

## 2023-01-23 RX ADMIN — IPRATROPIUM BROMIDE AND ALBUTEROL SULFATE 3 ML: 2.5; .5 SOLUTION RESPIRATORY (INHALATION) at 19:25

## 2023-01-23 RX ADMIN — INSULIN DETEMIR 20 UNITS: 100 INJECTION, SOLUTION SUBCUTANEOUS at 21:32

## 2023-01-23 RX ADMIN — BUMETANIDE 2 MG: 0.25 INJECTION INTRAMUSCULAR; INTRAVENOUS at 17:25

## 2023-01-23 RX ADMIN — IPRATROPIUM BROMIDE AND ALBUTEROL SULFATE 3 ML: 2.5; .5 SOLUTION RESPIRATORY (INHALATION) at 06:00

## 2023-01-23 RX ADMIN — OXYCODONE HYDROCHLORIDE AND ACETAMINOPHEN 1000 MG: 500 TABLET ORAL at 08:47

## 2023-01-23 RX ADMIN — TERAZOSIN HYDROCHLORIDE 2 MG: 2 CAPSULE ORAL at 21:33

## 2023-01-23 RX ADMIN — PANTOPRAZOLE SODIUM 40 MG: 40 TABLET, DELAYED RELEASE ORAL at 06:32

## 2023-01-23 RX ADMIN — PANTOPRAZOLE SODIUM 40 MG: 40 TABLET, DELAYED RELEASE ORAL at 17:25

## 2023-01-23 RX ADMIN — SUCRALFATE 1 G: 1 TABLET ORAL at 17:25

## 2023-01-23 RX ADMIN — DULOXETINE HYDROCHLORIDE 60 MG: 30 CAPSULE, DELAYED RELEASE ORAL at 08:47

## 2023-01-23 RX ADMIN — LOSARTAN POTASSIUM 50 MG: 50 TABLET, FILM COATED ORAL at 08:47

## 2023-01-23 RX ADMIN — BUSPIRONE HYDROCHLORIDE 10 MG: 10 TABLET ORAL at 21:33

## 2023-01-23 RX ADMIN — ENOXAPARIN SODIUM 40 MG: 100 INJECTION SUBCUTANEOUS at 21:32

## 2023-01-23 RX ADMIN — ROSUVASTATIN CALCIUM 40 MG: 20 TABLET, FILM COATED ORAL at 21:33

## 2023-01-23 RX ADMIN — TRAZODONE HYDROCHLORIDE 100 MG: 100 TABLET ORAL at 21:33

## 2023-01-23 RX ADMIN — SUCRALFATE 1 G: 1 TABLET ORAL at 08:48

## 2023-01-23 RX ADMIN — PREGABALIN 50 MG: 50 CAPSULE ORAL at 17:25

## 2023-01-23 RX ADMIN — CARVEDILOL 6.25 MG: 6.25 TABLET, FILM COATED ORAL at 21:33

## 2023-01-23 RX ADMIN — IPRATROPIUM BROMIDE AND ALBUTEROL SULFATE 3 ML: 2.5; .5 SOLUTION RESPIRATORY (INHALATION) at 14:46

## 2023-01-23 RX ADMIN — DOCUSATE SODIUM 50 MG AND SENNOSIDES 8.6 MG 1 TABLET: 8.6; 5 TABLET, FILM COATED ORAL at 08:47

## 2023-01-23 RX ADMIN — ASPIRIN 81 MG: 81 TABLET, CHEWABLE ORAL at 08:47

## 2023-01-23 RX ADMIN — DONEPEZIL HYDROCHLORIDE 10 MG: 10 TABLET, FILM COATED ORAL at 08:48

## 2023-01-23 RX ADMIN — SUCRALFATE 1 G: 1 TABLET ORAL at 21:33

## 2023-01-23 RX ADMIN — PREGABALIN 50 MG: 50 CAPSULE ORAL at 21:33

## 2023-01-23 RX ADMIN — CARVEDILOL 6.25 MG: 6.25 TABLET, FILM COATED ORAL at 08:47

## 2023-01-23 RX ADMIN — SUCRALFATE 1 G: 1 TABLET ORAL at 12:47

## 2023-01-23 RX ADMIN — TIMOLOL MALEATE 1 DROP: 5 SOLUTION/ DROPS OPHTHALMIC at 21:32

## 2023-01-23 RX ADMIN — TIMOLOL MALEATE 1 DROP: 5 SOLUTION/ DROPS OPHTHALMIC at 08:48

## 2023-01-23 RX ADMIN — PREGABALIN 50 MG: 50 CAPSULE ORAL at 08:47

## 2023-01-23 RX ADMIN — ZINC SULFATE 220 MG (50 MG) CAPSULE 220 MG: CAPSULE at 21:33

## 2023-01-23 RX ADMIN — BUMETANIDE 2 MG: 0.25 INJECTION INTRAMUSCULAR; INTRAVENOUS at 08:47

## 2023-01-23 RX ADMIN — ZINC SULFATE 220 MG (50 MG) CAPSULE 220 MG: CAPSULE at 08:47

## 2023-01-23 RX ADMIN — DOCUSATE SODIUM 50 MG AND SENNOSIDES 8.6 MG 1 TABLET: 8.6; 5 TABLET, FILM COATED ORAL at 21:33

## 2023-01-23 RX ADMIN — CALCITRIOL 0.25 MCG: 0.25 CAPSULE ORAL at 21:33

## 2023-01-23 RX ADMIN — INSULIN LISPRO 20 UNITS: 100 INJECTION, SOLUTION INTRAVENOUS; SUBCUTANEOUS at 12:47

## 2023-01-24 LAB
ANION GAP SERPL CALCULATED.3IONS-SCNC: 11 MMOL/L (ref 5–15)
BUN SERPL-MCNC: 44 MG/DL (ref 8–23)
BUN/CREAT SERPL: 18.6 (ref 7–25)
CALCIUM SPEC-SCNC: 8.7 MG/DL (ref 8.6–10.5)
CHLORIDE SERPL-SCNC: 101 MMOL/L (ref 98–107)
CO2 SERPL-SCNC: 26 MMOL/L (ref 22–29)
CREAT SERPL-MCNC: 2.36 MG/DL (ref 0.76–1.27)
EGFRCR SERPLBLD CKD-EPI 2021: 29.6 ML/MIN/1.73
GLUCOSE BLDC GLUCOMTR-MCNC: 208 MG/DL (ref 70–130)
GLUCOSE BLDC GLUCOMTR-MCNC: 286 MG/DL (ref 70–130)
GLUCOSE BLDC GLUCOMTR-MCNC: 298 MG/DL (ref 70–130)
GLUCOSE BLDC GLUCOMTR-MCNC: 389 MG/DL (ref 70–130)
GLUCOSE SERPL-MCNC: 302 MG/DL (ref 65–99)
POTASSIUM SERPL-SCNC: 4.5 MMOL/L (ref 3.5–5.2)
SODIUM SERPL-SCNC: 138 MMOL/L (ref 136–145)

## 2023-01-24 PROCEDURE — 80048 BASIC METABOLIC PNL TOTAL CA: CPT | Performed by: FAMILY MEDICINE

## 2023-01-24 PROCEDURE — 63710000001 INSULIN LISPRO (HUMAN) PER 5 UNITS: Performed by: FAMILY MEDICINE

## 2023-01-24 PROCEDURE — 25010000002 ENOXAPARIN PER 10 MG: Performed by: INTERNAL MEDICINE

## 2023-01-24 PROCEDURE — 94799 UNLISTED PULMONARY SVC/PX: CPT

## 2023-01-24 PROCEDURE — 94760 N-INVAS EAR/PLS OXIMETRY 1: CPT

## 2023-01-24 PROCEDURE — 63710000001 INSULIN DETEMIR PER 5 UNITS: Performed by: NURSE PRACTITIONER

## 2023-01-24 PROCEDURE — 82962 GLUCOSE BLOOD TEST: CPT

## 2023-01-24 RX ORDER — BUMETANIDE 1 MG/1
2 TABLET ORAL
Status: DISCONTINUED | OUTPATIENT
Start: 2023-01-24 | End: 2023-01-26 | Stop reason: HOSPADM

## 2023-01-24 RX ADMIN — IPRATROPIUM BROMIDE AND ALBUTEROL SULFATE 3 ML: 2.5; .5 SOLUTION RESPIRATORY (INHALATION) at 10:02

## 2023-01-24 RX ADMIN — BUSPIRONE HYDROCHLORIDE 10 MG: 10 TABLET ORAL at 06:32

## 2023-01-24 RX ADMIN — SUCRALFATE 1 G: 1 TABLET ORAL at 16:47

## 2023-01-24 RX ADMIN — INSULIN DETEMIR 30 UNITS: 100 INJECTION, SOLUTION SUBCUTANEOUS at 21:01

## 2023-01-24 RX ADMIN — DULOXETINE HYDROCHLORIDE 60 MG: 30 CAPSULE, DELAYED RELEASE ORAL at 08:32

## 2023-01-24 RX ADMIN — HYDROCODONE BITARTRATE AND ACETAMINOPHEN 1 TABLET: 7.5; 325 TABLET ORAL at 08:31

## 2023-01-24 RX ADMIN — TERAZOSIN HYDROCHLORIDE 2 MG: 2 CAPSULE ORAL at 21:02

## 2023-01-24 RX ADMIN — LOSARTAN POTASSIUM 50 MG: 50 TABLET, FILM COATED ORAL at 08:32

## 2023-01-24 RX ADMIN — Medication 10 ML: at 21:01

## 2023-01-24 RX ADMIN — INSULIN LISPRO 20 UNITS: 100 INJECTION, SOLUTION INTRAVENOUS; SUBCUTANEOUS at 11:07

## 2023-01-24 RX ADMIN — HYDROCODONE BITARTRATE AND ACETAMINOPHEN 1 TABLET: 7.5; 325 TABLET ORAL at 21:02

## 2023-01-24 RX ADMIN — Medication 2000 UNITS: at 08:32

## 2023-01-24 RX ADMIN — BUMETANIDE 2 MG: 0.25 INJECTION INTRAMUSCULAR; INTRAVENOUS at 08:26

## 2023-01-24 RX ADMIN — PREGABALIN 50 MG: 50 CAPSULE ORAL at 08:31

## 2023-01-24 RX ADMIN — BUMETANIDE 2 MG: 1 TABLET ORAL at 17:47

## 2023-01-24 RX ADMIN — PANTOPRAZOLE SODIUM 40 MG: 40 TABLET, DELAYED RELEASE ORAL at 06:32

## 2023-01-24 RX ADMIN — OXYCODONE HYDROCHLORIDE AND ACETAMINOPHEN 1000 MG: 500 TABLET ORAL at 08:32

## 2023-01-24 RX ADMIN — ENOXAPARIN SODIUM 40 MG: 100 INJECTION SUBCUTANEOUS at 21:01

## 2023-01-24 RX ADMIN — CALCITRIOL 0.25 MCG: 0.25 CAPSULE ORAL at 08:32

## 2023-01-24 RX ADMIN — CARVEDILOL 6.25 MG: 6.25 TABLET, FILM COATED ORAL at 08:32

## 2023-01-24 RX ADMIN — SUCRALFATE 1 G: 1 TABLET ORAL at 06:32

## 2023-01-24 RX ADMIN — SUCRALFATE 1 G: 1 TABLET ORAL at 11:07

## 2023-01-24 RX ADMIN — IPRATROPIUM BROMIDE AND ALBUTEROL SULFATE 3 ML: 2.5; .5 SOLUTION RESPIRATORY (INHALATION) at 14:07

## 2023-01-24 RX ADMIN — ROSUVASTATIN CALCIUM 40 MG: 20 TABLET, FILM COATED ORAL at 21:02

## 2023-01-24 RX ADMIN — TRAZODONE HYDROCHLORIDE 100 MG: 100 TABLET ORAL at 21:02

## 2023-01-24 RX ADMIN — INSULIN LISPRO 12 UNITS: 100 INJECTION, SOLUTION INTRAVENOUS; SUBCUTANEOUS at 08:33

## 2023-01-24 RX ADMIN — SUCRALFATE 1 G: 1 TABLET ORAL at 21:02

## 2023-01-24 RX ADMIN — IPRATROPIUM BROMIDE AND ALBUTEROL SULFATE 3 ML: 2.5; .5 SOLUTION RESPIRATORY (INHALATION) at 19:18

## 2023-01-24 RX ADMIN — ZINC SULFATE 220 MG (50 MG) CAPSULE 220 MG: CAPSULE at 08:31

## 2023-01-24 RX ADMIN — PREGABALIN 50 MG: 50 CAPSULE ORAL at 15:41

## 2023-01-24 RX ADMIN — PANTOPRAZOLE SODIUM 40 MG: 40 TABLET, DELAYED RELEASE ORAL at 16:47

## 2023-01-24 RX ADMIN — CARVEDILOL 6.25 MG: 6.25 TABLET, FILM COATED ORAL at 21:02

## 2023-01-24 RX ADMIN — PREGABALIN 50 MG: 50 CAPSULE ORAL at 21:02

## 2023-01-24 RX ADMIN — DOCUSATE SODIUM 50 MG AND SENNOSIDES 8.6 MG 1 TABLET: 8.6; 5 TABLET, FILM COATED ORAL at 21:02

## 2023-01-24 RX ADMIN — DOCUSATE SODIUM 50 MG AND SENNOSIDES 8.6 MG 1 TABLET: 8.6; 5 TABLET, FILM COATED ORAL at 08:32

## 2023-01-24 RX ADMIN — TIMOLOL MALEATE 1 DROP: 5 SOLUTION/ DROPS OPHTHALMIC at 21:01

## 2023-01-24 RX ADMIN — POLYETHYLENE GLYCOL 3350 17 G: 17 POWDER, FOR SOLUTION ORAL at 08:32

## 2023-01-24 RX ADMIN — DONEPEZIL HYDROCHLORIDE 10 MG: 10 TABLET, FILM COATED ORAL at 08:34

## 2023-01-24 RX ADMIN — IPRATROPIUM BROMIDE AND ALBUTEROL SULFATE 3 ML: 2.5; .5 SOLUTION RESPIRATORY (INHALATION) at 06:22

## 2023-01-24 RX ADMIN — ASPIRIN 81 MG: 81 TABLET, CHEWABLE ORAL at 08:33

## 2023-01-24 RX ADMIN — INSULIN LISPRO 8 UNITS: 100 INJECTION, SOLUTION INTRAVENOUS; SUBCUTANEOUS at 16:47

## 2023-01-24 RX ADMIN — BUSPIRONE HYDROCHLORIDE 10 MG: 10 TABLET ORAL at 13:31

## 2023-01-24 RX ADMIN — TIMOLOL MALEATE 1 DROP: 5 SOLUTION/ DROPS OPHTHALMIC at 08:33

## 2023-01-24 RX ADMIN — ZINC SULFATE 220 MG (50 MG) CAPSULE 220 MG: CAPSULE at 21:02

## 2023-01-24 RX ADMIN — Medication 10 ML: at 08:33

## 2023-01-24 RX ADMIN — BUSPIRONE HYDROCHLORIDE 10 MG: 10 TABLET ORAL at 21:02

## 2023-01-25 PROBLEM — N18.32 STAGE 3B CHRONIC KIDNEY DISEASE: Status: ACTIVE | Noted: 2023-01-25

## 2023-01-25 LAB
ANION GAP SERPL CALCULATED.3IONS-SCNC: 11 MMOL/L (ref 5–15)
BUN SERPL-MCNC: 47 MG/DL (ref 8–23)
BUN/CREAT SERPL: 18.7 (ref 7–25)
CALCIUM SPEC-SCNC: 8.6 MG/DL (ref 8.6–10.5)
CHLORIDE SERPL-SCNC: 98 MMOL/L (ref 98–107)
CO2 SERPL-SCNC: 26 MMOL/L (ref 22–29)
CREAT SERPL-MCNC: 2.51 MG/DL (ref 0.76–1.27)
EGFRCR SERPLBLD CKD-EPI 2021: 27.5 ML/MIN/1.73
GLUCOSE BLDC GLUCOMTR-MCNC: 205 MG/DL (ref 70–130)
GLUCOSE BLDC GLUCOMTR-MCNC: 246 MG/DL (ref 70–130)
GLUCOSE BLDC GLUCOMTR-MCNC: 263 MG/DL (ref 70–130)
GLUCOSE BLDC GLUCOMTR-MCNC: 293 MG/DL (ref 70–130)
GLUCOSE SERPL-MCNC: 267 MG/DL (ref 65–99)
POTASSIUM SERPL-SCNC: 4.5 MMOL/L (ref 3.5–5.2)
SODIUM SERPL-SCNC: 135 MMOL/L (ref 136–145)

## 2023-01-25 PROCEDURE — 94799 UNLISTED PULMONARY SVC/PX: CPT

## 2023-01-25 PROCEDURE — 82962 GLUCOSE BLOOD TEST: CPT

## 2023-01-25 PROCEDURE — 63710000001 INSULIN LISPRO (HUMAN) PER 5 UNITS: Performed by: FAMILY MEDICINE

## 2023-01-25 PROCEDURE — 94760 N-INVAS EAR/PLS OXIMETRY 1: CPT

## 2023-01-25 PROCEDURE — 63710000001 INSULIN DETEMIR PER 5 UNITS: Performed by: NURSE PRACTITIONER

## 2023-01-25 PROCEDURE — 25010000002 ENOXAPARIN PER 10 MG: Performed by: INTERNAL MEDICINE

## 2023-01-25 PROCEDURE — 94664 DEMO&/EVAL PT USE INHALER: CPT

## 2023-01-25 PROCEDURE — 80048 BASIC METABOLIC PNL TOTAL CA: CPT | Performed by: FAMILY MEDICINE

## 2023-01-25 RX ADMIN — HYDROCODONE BITARTRATE AND ACETAMINOPHEN 1 TABLET: 7.5; 325 TABLET ORAL at 21:15

## 2023-01-25 RX ADMIN — TIMOLOL MALEATE 1 DROP: 5 SOLUTION/ DROPS OPHTHALMIC at 21:16

## 2023-01-25 RX ADMIN — IPRATROPIUM BROMIDE AND ALBUTEROL SULFATE 3 ML: 2.5; .5 SOLUTION RESPIRATORY (INHALATION) at 05:27

## 2023-01-25 RX ADMIN — TIMOLOL MALEATE 1 DROP: 5 SOLUTION/ DROPS OPHTHALMIC at 08:00

## 2023-01-25 RX ADMIN — IPRATROPIUM BROMIDE AND ALBUTEROL SULFATE 3 ML: 2.5; .5 SOLUTION RESPIRATORY (INHALATION) at 09:16

## 2023-01-25 RX ADMIN — SUCRALFATE 1 G: 1 TABLET ORAL at 17:19

## 2023-01-25 RX ADMIN — CALCITRIOL 0.25 MCG: 0.25 CAPSULE ORAL at 08:01

## 2023-01-25 RX ADMIN — BUMETANIDE 2 MG: 1 TABLET ORAL at 17:19

## 2023-01-25 RX ADMIN — INSULIN DETEMIR 35 UNITS: 100 INJECTION, SOLUTION SUBCUTANEOUS at 21:15

## 2023-01-25 RX ADMIN — INSULIN LISPRO 8 UNITS: 100 INJECTION, SOLUTION INTRAVENOUS; SUBCUTANEOUS at 11:41

## 2023-01-25 RX ADMIN — TERAZOSIN HYDROCHLORIDE 2 MG: 2 CAPSULE ORAL at 21:17

## 2023-01-25 RX ADMIN — IPRATROPIUM BROMIDE AND ALBUTEROL SULFATE 3 ML: 2.5; .5 SOLUTION RESPIRATORY (INHALATION) at 18:22

## 2023-01-25 RX ADMIN — HYDROCODONE BITARTRATE AND ACETAMINOPHEN 1 TABLET: 7.5; 325 TABLET ORAL at 08:01

## 2023-01-25 RX ADMIN — ENOXAPARIN SODIUM 40 MG: 100 INJECTION SUBCUTANEOUS at 21:16

## 2023-01-25 RX ADMIN — DOCUSATE SODIUM 50 MG AND SENNOSIDES 8.6 MG 1 TABLET: 8.6; 5 TABLET, FILM COATED ORAL at 21:14

## 2023-01-25 RX ADMIN — ROSUVASTATIN CALCIUM 40 MG: 20 TABLET, FILM COATED ORAL at 21:15

## 2023-01-25 RX ADMIN — SUCRALFATE 1 G: 1 TABLET ORAL at 06:34

## 2023-01-25 RX ADMIN — CARVEDILOL 6.25 MG: 6.25 TABLET, FILM COATED ORAL at 21:14

## 2023-01-25 RX ADMIN — PANTOPRAZOLE SODIUM 40 MG: 40 TABLET, DELAYED RELEASE ORAL at 17:19

## 2023-01-25 RX ADMIN — ZINC SULFATE 220 MG (50 MG) CAPSULE 220 MG: CAPSULE at 21:15

## 2023-01-25 RX ADMIN — DULOXETINE HYDROCHLORIDE 60 MG: 30 CAPSULE, DELAYED RELEASE ORAL at 08:00

## 2023-01-25 RX ADMIN — SUCRALFATE 1 G: 1 TABLET ORAL at 21:15

## 2023-01-25 RX ADMIN — PANTOPRAZOLE SODIUM 40 MG: 40 TABLET, DELAYED RELEASE ORAL at 06:34

## 2023-01-25 RX ADMIN — BUSPIRONE HYDROCHLORIDE 10 MG: 10 TABLET ORAL at 14:28

## 2023-01-25 RX ADMIN — BUSPIRONE HYDROCHLORIDE 10 MG: 10 TABLET ORAL at 21:14

## 2023-01-25 RX ADMIN — POLYETHYLENE GLYCOL 3350 17 G: 17 POWDER, FOR SOLUTION ORAL at 08:01

## 2023-01-25 RX ADMIN — INSULIN LISPRO 12 UNITS: 100 INJECTION, SOLUTION INTRAVENOUS; SUBCUTANEOUS at 07:57

## 2023-01-25 RX ADMIN — BUSPIRONE HYDROCHLORIDE 10 MG: 10 TABLET ORAL at 06:34

## 2023-01-25 RX ADMIN — INSULIN LISPRO 8 UNITS: 100 INJECTION, SOLUTION INTRAVENOUS; SUBCUTANEOUS at 17:19

## 2023-01-25 RX ADMIN — LOSARTAN POTASSIUM 50 MG: 50 TABLET, FILM COATED ORAL at 08:00

## 2023-01-25 RX ADMIN — BUMETANIDE 2 MG: 1 TABLET ORAL at 08:00

## 2023-01-25 RX ADMIN — DOCUSATE SODIUM 50 MG AND SENNOSIDES 8.6 MG 1 TABLET: 8.6; 5 TABLET, FILM COATED ORAL at 08:00

## 2023-01-25 RX ADMIN — ZINC SULFATE 220 MG (50 MG) CAPSULE 220 MG: CAPSULE at 08:06

## 2023-01-25 RX ADMIN — TRAZODONE HYDROCHLORIDE 100 MG: 100 TABLET ORAL at 21:14

## 2023-01-25 RX ADMIN — ASPIRIN 81 MG: 81 TABLET, CHEWABLE ORAL at 08:01

## 2023-01-25 RX ADMIN — CARVEDILOL 6.25 MG: 6.25 TABLET, FILM COATED ORAL at 08:00

## 2023-01-25 RX ADMIN — IPRATROPIUM BROMIDE AND ALBUTEROL SULFATE 3 ML: 2.5; .5 SOLUTION RESPIRATORY (INHALATION) at 13:30

## 2023-01-25 RX ADMIN — PREGABALIN 50 MG: 50 CAPSULE ORAL at 17:19

## 2023-01-25 RX ADMIN — PREGABALIN 50 MG: 50 CAPSULE ORAL at 21:15

## 2023-01-25 RX ADMIN — OXYCODONE HYDROCHLORIDE AND ACETAMINOPHEN 1000 MG: 500 TABLET ORAL at 08:00

## 2023-01-25 RX ADMIN — PREGABALIN 50 MG: 50 CAPSULE ORAL at 08:01

## 2023-01-25 RX ADMIN — DONEPEZIL HYDROCHLORIDE 10 MG: 10 TABLET, FILM COATED ORAL at 08:00

## 2023-01-25 RX ADMIN — SUCRALFATE 1 G: 1 TABLET ORAL at 11:41

## 2023-01-25 RX ADMIN — Medication 2000 UNITS: at 08:00

## 2023-01-26 ENCOUNTER — READMISSION MANAGEMENT (OUTPATIENT)
Dept: CALL CENTER | Facility: HOSPITAL | Age: 68
End: 2023-01-26
Payer: COMMERCIAL

## 2023-01-26 VITALS
HEIGHT: 72 IN | BODY MASS INDEX: 42.66 KG/M2 | SYSTOLIC BLOOD PRESSURE: 128 MMHG | TEMPERATURE: 97.9 F | RESPIRATION RATE: 18 BRPM | WEIGHT: 315 LBS | DIASTOLIC BLOOD PRESSURE: 90 MMHG | OXYGEN SATURATION: 96 % | HEART RATE: 86 BPM

## 2023-01-26 LAB
ANION GAP SERPL CALCULATED.3IONS-SCNC: 10 MMOL/L (ref 5–15)
BUN SERPL-MCNC: 47 MG/DL (ref 8–23)
BUN/CREAT SERPL: 20.3 (ref 7–25)
CALCIUM SPEC-SCNC: 9.1 MG/DL (ref 8.6–10.5)
CHLORIDE SERPL-SCNC: 97 MMOL/L (ref 98–107)
CO2 SERPL-SCNC: 30 MMOL/L (ref 22–29)
CREAT SERPL-MCNC: 2.32 MG/DL (ref 0.76–1.27)
EGFRCR SERPLBLD CKD-EPI 2021: 30.2 ML/MIN/1.73
GLUCOSE BLDC GLUCOMTR-MCNC: 260 MG/DL (ref 70–130)
GLUCOSE BLDC GLUCOMTR-MCNC: 295 MG/DL (ref 70–130)
GLUCOSE SERPL-MCNC: 276 MG/DL (ref 65–99)
POTASSIUM SERPL-SCNC: 4.4 MMOL/L (ref 3.5–5.2)
SODIUM SERPL-SCNC: 137 MMOL/L (ref 136–145)

## 2023-01-26 PROCEDURE — 94799 UNLISTED PULMONARY SVC/PX: CPT

## 2023-01-26 PROCEDURE — 94664 DEMO&/EVAL PT USE INHALER: CPT

## 2023-01-26 PROCEDURE — 80048 BASIC METABOLIC PNL TOTAL CA: CPT | Performed by: FAMILY MEDICINE

## 2023-01-26 PROCEDURE — 82962 GLUCOSE BLOOD TEST: CPT

## 2023-01-26 PROCEDURE — 63710000001 INSULIN LISPRO (HUMAN) PER 5 UNITS: Performed by: FAMILY MEDICINE

## 2023-01-26 RX ORDER — SUCRALFATE ORAL 1 G/10ML
1 SUSPENSION ORAL
Qty: 1200 ML | Refills: 0 | Status: SHIPPED | OUTPATIENT
Start: 2023-01-26 | End: 2023-02-27 | Stop reason: HOSPADM

## 2023-01-26 RX ORDER — BUMETANIDE 2 MG/1
2 TABLET ORAL 2 TIMES DAILY
Qty: 60 TABLET | Refills: 0 | Status: ON HOLD | OUTPATIENT
Start: 2023-01-26 | End: 2023-02-27

## 2023-01-26 RX ORDER — ALBUTEROL SULFATE 90 UG/1
2 AEROSOL, METERED RESPIRATORY (INHALATION) EVERY 4 HOURS PRN
Qty: 8 G | Refills: 1 | Status: SHIPPED | OUTPATIENT
Start: 2023-01-26

## 2023-01-26 RX ADMIN — DOCUSATE SODIUM 50 MG AND SENNOSIDES 8.6 MG 1 TABLET: 8.6; 5 TABLET, FILM COATED ORAL at 09:04

## 2023-01-26 RX ADMIN — INSULIN LISPRO 12 UNITS: 100 INJECTION, SOLUTION INTRAVENOUS; SUBCUTANEOUS at 09:02

## 2023-01-26 RX ADMIN — PREGABALIN 50 MG: 50 CAPSULE ORAL at 09:02

## 2023-01-26 RX ADMIN — CARVEDILOL 6.25 MG: 6.25 TABLET, FILM COATED ORAL at 09:02

## 2023-01-26 RX ADMIN — Medication 2000 UNITS: at 09:02

## 2023-01-26 RX ADMIN — DULOXETINE HYDROCHLORIDE 60 MG: 30 CAPSULE, DELAYED RELEASE ORAL at 09:02

## 2023-01-26 RX ADMIN — HYDROCODONE BITARTRATE AND ACETAMINOPHEN 1 TABLET: 7.5; 325 TABLET ORAL at 09:03

## 2023-01-26 RX ADMIN — PANTOPRAZOLE SODIUM 40 MG: 40 TABLET, DELAYED RELEASE ORAL at 09:02

## 2023-01-26 RX ADMIN — CALCITRIOL 0.25 MCG: 0.25 CAPSULE ORAL at 09:02

## 2023-01-26 RX ADMIN — SUCRALFATE 1 G: 1 TABLET ORAL at 09:03

## 2023-01-26 RX ADMIN — TIMOLOL MALEATE 1 DROP: 5 SOLUTION/ DROPS OPHTHALMIC at 09:04

## 2023-01-26 RX ADMIN — IPRATROPIUM BROMIDE AND ALBUTEROL SULFATE 3 ML: 2.5; .5 SOLUTION RESPIRATORY (INHALATION) at 13:31

## 2023-01-26 RX ADMIN — LOSARTAN POTASSIUM 50 MG: 50 TABLET, FILM COATED ORAL at 09:03

## 2023-01-26 RX ADMIN — IPRATROPIUM BROMIDE AND ALBUTEROL SULFATE 3 ML: 2.5; .5 SOLUTION RESPIRATORY (INHALATION) at 09:30

## 2023-01-26 RX ADMIN — BUSPIRONE HYDROCHLORIDE 10 MG: 10 TABLET ORAL at 06:41

## 2023-01-26 RX ADMIN — OXYCODONE HYDROCHLORIDE AND ACETAMINOPHEN 1000 MG: 500 TABLET ORAL at 09:03

## 2023-01-26 RX ADMIN — ASPIRIN 81 MG: 81 TABLET, CHEWABLE ORAL at 09:10

## 2023-01-26 RX ADMIN — BUSPIRONE HYDROCHLORIDE 10 MG: 10 TABLET ORAL at 13:10

## 2023-01-26 RX ADMIN — SUCRALFATE 1 G: 1 TABLET ORAL at 13:10

## 2023-01-26 RX ADMIN — BUMETANIDE 2 MG: 1 TABLET ORAL at 09:02

## 2023-01-26 RX ADMIN — IPRATROPIUM BROMIDE AND ALBUTEROL SULFATE 3 ML: 2.5; .5 SOLUTION RESPIRATORY (INHALATION) at 05:05

## 2023-01-26 RX ADMIN — INSULIN LISPRO 12 UNITS: 100 INJECTION, SOLUTION INTRAVENOUS; SUBCUTANEOUS at 13:10

## 2023-01-26 RX ADMIN — ZINC SULFATE 220 MG (50 MG) CAPSULE 220 MG: CAPSULE at 09:10

## 2023-01-26 RX ADMIN — DONEPEZIL HYDROCHLORIDE 10 MG: 10 TABLET, FILM COATED ORAL at 09:03

## 2023-01-26 RX ADMIN — POLYETHYLENE GLYCOL 3350 17 G: 17 POWDER, FOR SOLUTION ORAL at 09:03

## 2023-01-27 NOTE — OUTREACH NOTE
Prep Survey    Flowsheet Row Responses   [facility] patient discharged from? [facility]   Is LACE score < 7 ? No   Eligibility Readm Mgmt   Discharge diagnosis Stage 3b chronic kidney disease    Does the patient have one of the following disease processes/diagnoses(primary or secondary)? Other   Does the patient have Home health ordered? No   Is there a DME ordered? No   Prep survey completed? Yes          AICHA MITCHELL - Registered Nurse

## 2023-02-02 ENCOUNTER — APPOINTMENT (OUTPATIENT)
Dept: GENERAL RADIOLOGY | Facility: HOSPITAL | Age: 68
End: 2023-02-02
Payer: COMMERCIAL

## 2023-02-02 ENCOUNTER — HOSPITAL ENCOUNTER (EMERGENCY)
Facility: HOSPITAL | Age: 68
Discharge: HOME | End: 2023-02-02
Attending: FAMILY MEDICINE | Admitting: FAMILY MEDICINE
Payer: COMMERCIAL

## 2023-02-02 VITALS
WEIGHT: 315 LBS | SYSTOLIC BLOOD PRESSURE: 133 MMHG | RESPIRATION RATE: 20 BRPM | DIASTOLIC BLOOD PRESSURE: 72 MMHG | OXYGEN SATURATION: 94 % | HEART RATE: 90 BPM | BODY MASS INDEX: 42.66 KG/M2 | TEMPERATURE: 98.8 F | HEIGHT: 72 IN

## 2023-02-02 DIAGNOSIS — R07.9 CHEST PAIN, UNSPECIFIED TYPE: ICD-10-CM

## 2023-02-02 DIAGNOSIS — K59.00 CONSTIPATION, UNSPECIFIED CONSTIPATION TYPE: Primary | ICD-10-CM

## 2023-02-02 DIAGNOSIS — E11.65 HYPERGLYCEMIA DUE TO DIABETES MELLITUS: ICD-10-CM

## 2023-02-02 LAB
ACETONE BLD QL: NEGATIVE
ALBUMIN SERPL-MCNC: 4.1 G/DL (ref 3.5–5.2)
ALBUMIN/GLOB SERPL: 1.6 G/DL
ALP SERPL-CCNC: 87 U/L (ref 39–117)
ALT SERPL W P-5'-P-CCNC: 11 U/L (ref 1–41)
ANION GAP SERPL CALCULATED.3IONS-SCNC: 14 MMOL/L (ref 5–15)
ARTERIAL PATENCY WRIST A: POSITIVE
ARTERIAL PATENCY WRIST A: POSITIVE
AST SERPL-CCNC: 14 U/L (ref 1–40)
ATMOSPHERIC PRESS: 756 MMHG
ATMOSPHERIC PRESS: 756 MMHG
BACTERIA UR QL AUTO: ABNORMAL /HPF
BASE EXCESS BLDA CALC-SCNC: 4.6 MMOL/L (ref 0–2)
BASE EXCESS BLDA CALC-SCNC: 4.6 MMOL/L (ref 0–2)
BASOPHILS # BLD AUTO: 0.04 10*3/MM3 (ref 0–0.2)
BASOPHILS NFR BLD AUTO: 0.5 % (ref 0–1.5)
BDY SITE: ABNORMAL
BDY SITE: ABNORMAL
BILIRUB SERPL-MCNC: 0.4 MG/DL (ref 0–1.2)
BILIRUB UR QL STRIP: NEGATIVE
BODY TEMPERATURE: 37 C
BODY TEMPERATURE: 37 C
BUN SERPL-MCNC: 55 MG/DL (ref 8–23)
BUN/CREAT SERPL: 21.1 (ref 7–25)
CALCIUM SPEC-SCNC: 9.1 MG/DL (ref 8.6–10.5)
CHLORIDE SERPL-SCNC: 90 MMOL/L (ref 98–107)
CLARITY UR: CLEAR
CO2 SERPL-SCNC: 27 MMOL/L (ref 22–29)
COLOR UR: YELLOW
CREAT SERPL-MCNC: 2.61 MG/DL (ref 0.76–1.27)
DEPRECATED RDW RBC AUTO: 43.3 FL (ref 37–54)
EGFRCR SERPLBLD CKD-EPI 2021: 26.3 ML/MIN/1.73
EOSINOPHIL # BLD AUTO: 0.25 10*3/MM3 (ref 0–0.4)
EOSINOPHIL NFR BLD AUTO: 2.9 % (ref 0.3–6.2)
ERYTHROCYTE [DISTWIDTH] IN BLOOD BY AUTOMATED COUNT: 13.6 % (ref 12.3–15.4)
GLOBULIN UR ELPH-MCNC: 2.5 GM/DL
GLUCOSE BLDC GLUCOMTR-MCNC: 361 MG/DL (ref 70–130)
GLUCOSE BLDC GLUCOMTR-MCNC: 460 MG/DL (ref 70–130)
GLUCOSE SERPL-MCNC: 545 MG/DL (ref 65–99)
GLUCOSE UR STRIP-MCNC: ABNORMAL MG/DL
HCO3 BLDA-SCNC: 29.1 MMOL/L (ref 20–26)
HCO3 BLDA-SCNC: 29.1 MMOL/L (ref 20–26)
HCT VFR BLD AUTO: 41.7 % (ref 37.5–51)
HGB BLD-MCNC: 13.2 G/DL (ref 13–17.7)
HGB UR QL STRIP.AUTO: NEGATIVE
HOLD SPECIMEN: NORMAL
HYALINE CASTS UR QL AUTO: ABNORMAL /LPF
IMM GRANULOCYTES # BLD AUTO: 0.03 10*3/MM3 (ref 0–0.05)
IMM GRANULOCYTES NFR BLD AUTO: 0.3 % (ref 0–0.5)
KETONES UR QL STRIP: NEGATIVE
LEUKOCYTE ESTERASE UR QL STRIP.AUTO: NEGATIVE
LYMPHOCYTES # BLD AUTO: 1.71 10*3/MM3 (ref 0.7–3.1)
LYMPHOCYTES NFR BLD AUTO: 19.5 % (ref 19.6–45.3)
Lab: ABNORMAL
Lab: ABNORMAL
MCH RBC QN AUTO: 27.7 PG (ref 26.6–33)
MCHC RBC AUTO-ENTMCNC: 31.7 G/DL (ref 31.5–35.7)
MCV RBC AUTO: 87.4 FL (ref 79–97)
MODALITY: ABNORMAL
MODALITY: ABNORMAL
MONOCYTES # BLD AUTO: 0.73 10*3/MM3 (ref 0.1–0.9)
MONOCYTES NFR BLD AUTO: 8.3 % (ref 5–12)
NEUTROPHILS NFR BLD AUTO: 6 10*3/MM3 (ref 1.7–7)
NEUTROPHILS NFR BLD AUTO: 68.5 % (ref 42.7–76)
NITRITE UR QL STRIP: NEGATIVE
NRBC BLD AUTO-RTO: 0 /100 WBC (ref 0–0.2)
NT-PROBNP SERPL-MCNC: 236.8 PG/ML (ref 0–900)
PCO2 BLDA: 42 MM HG (ref 35–45)
PCO2 BLDA: 42 MM HG (ref 35–45)
PCO2 TEMP ADJ BLD: 42 MM HG (ref 35–45)
PCO2 TEMP ADJ BLD: 42 MM HG (ref 35–45)
PH BLDA: 7.45 PH UNITS (ref 7.35–7.45)
PH BLDA: 7.45 PH UNITS (ref 7.35–7.45)
PH UR STRIP.AUTO: 6 [PH] (ref 5–8)
PH, TEMP CORRECTED: 7.45 PH UNITS (ref 7.35–7.45)
PH, TEMP CORRECTED: 7.45 PH UNITS (ref 7.35–7.45)
PLATELET # BLD AUTO: 242 10*3/MM3 (ref 140–450)
PMV BLD AUTO: 12.2 FL (ref 6–12)
PO2 BLDA: 81.6 MM HG (ref 83–108)
PO2 BLDA: 81.6 MM HG (ref 83–108)
PO2 TEMP ADJ BLD: 81.6 MM HG (ref 83–108)
PO2 TEMP ADJ BLD: 81.6 MM HG (ref 83–108)
POTASSIUM SERPL-SCNC: 4.4 MMOL/L (ref 3.5–5.2)
PROT SERPL-MCNC: 6.6 G/DL (ref 6–8.5)
PROT UR QL STRIP: ABNORMAL
RBC # BLD AUTO: 4.77 10*6/MM3 (ref 4.14–5.8)
RBC # UR STRIP: ABNORMAL /HPF
REF LAB TEST METHOD: ABNORMAL
SAO2 % BLDCOA: 97.2 % (ref 94–99)
SAO2 % BLDCOA: 97.2 % (ref 94–99)
SODIUM SERPL-SCNC: 131 MMOL/L (ref 136–145)
SP GR UR STRIP: 1.02 (ref 1–1.03)
SQUAMOUS #/AREA URNS HPF: ABNORMAL /HPF
TROPONIN T SERPL-MCNC: <0.01 NG/ML (ref 0–0.03)
TROPONIN T SERPL-MCNC: <0.01 NG/ML (ref 0–0.03)
UROBILINOGEN UR QL STRIP: ABNORMAL
VENTILATOR MODE: ABNORMAL
VENTILATOR MODE: ABNORMAL
WBC # UR STRIP: ABNORMAL /HPF
WBC NRBC COR # BLD: 8.76 10*3/MM3 (ref 3.4–10.8)
WHOLE BLOOD HOLD COAG: NORMAL
WHOLE BLOOD HOLD SPECIMEN: NORMAL

## 2023-02-02 PROCEDURE — 93010 ELECTROCARDIOGRAM REPORT: CPT | Performed by: INTERNAL MEDICINE

## 2023-02-02 PROCEDURE — 80053 COMPREHEN METABOLIC PANEL: CPT | Performed by: FAMILY MEDICINE

## 2023-02-02 PROCEDURE — 36415 COLL VENOUS BLD VENIPUNCTURE: CPT

## 2023-02-02 PROCEDURE — 93005 ELECTROCARDIOGRAM TRACING: CPT | Performed by: FAMILY MEDICINE

## 2023-02-02 PROCEDURE — 82009 KETONE BODYS QUAL: CPT | Performed by: FAMILY MEDICINE

## 2023-02-02 PROCEDURE — 63710000001 INSULIN REGULAR HUMAN PER 5 UNITS: Performed by: FAMILY MEDICINE

## 2023-02-02 PROCEDURE — 36600 WITHDRAWAL OF ARTERIAL BLOOD: CPT

## 2023-02-02 PROCEDURE — 85025 COMPLETE CBC W/AUTO DIFF WBC: CPT | Performed by: FAMILY MEDICINE

## 2023-02-02 PROCEDURE — 82803 BLOOD GASES ANY COMBINATION: CPT

## 2023-02-02 PROCEDURE — 99284 EMERGENCY DEPT VISIT MOD MDM: CPT

## 2023-02-02 PROCEDURE — 84484 ASSAY OF TROPONIN QUANT: CPT | Performed by: FAMILY MEDICINE

## 2023-02-02 PROCEDURE — 71045 X-RAY EXAM CHEST 1 VIEW: CPT

## 2023-02-02 PROCEDURE — 74019 RADEX ABDOMEN 2 VIEWS: CPT

## 2023-02-02 PROCEDURE — 83880 ASSAY OF NATRIURETIC PEPTIDE: CPT

## 2023-02-02 PROCEDURE — 82962 GLUCOSE BLOOD TEST: CPT

## 2023-02-02 PROCEDURE — 81001 URINALYSIS AUTO W/SCOPE: CPT | Performed by: FAMILY MEDICINE

## 2023-02-02 RX ORDER — SODIUM CHLORIDE 0.9 % (FLUSH) 0.9 %
10 SYRINGE (ML) INJECTION AS NEEDED
Status: DISCONTINUED | OUTPATIENT
Start: 2023-02-02 | End: 2023-02-02 | Stop reason: HOSPADM

## 2023-02-02 RX ORDER — ASPIRIN 81 MG/1
324 TABLET, CHEWABLE ORAL ONCE
Status: COMPLETED | OUTPATIENT
Start: 2023-02-02 | End: 2023-02-02

## 2023-02-02 RX ADMIN — INSULIN HUMAN 5 UNITS: 100 INJECTION, SOLUTION PARENTERAL at 19:50

## 2023-02-02 RX ADMIN — INSULIN HUMAN 8 UNITS: 100 INJECTION, SOLUTION PARENTERAL at 20:54

## 2023-02-02 RX ADMIN — ASPIRIN 324 MG: 81 TABLET, CHEWABLE ORAL at 19:52

## 2023-02-02 RX ADMIN — SODIUM CHLORIDE 1000 ML: 9 INJECTION, SOLUTION INTRAVENOUS at 19:50

## 2023-02-03 ENCOUNTER — NURSE TRIAGE (OUTPATIENT)
Dept: CALL CENTER | Facility: HOSPITAL | Age: 68
End: 2023-02-03
Payer: COMMERCIAL

## 2023-02-03 ENCOUNTER — HOSPITAL ENCOUNTER (EMERGENCY)
Facility: HOSPITAL | Age: 68
Discharge: HOME | End: 2023-02-03
Attending: STUDENT IN AN ORGANIZED HEALTH CARE EDUCATION/TRAINING PROGRAM | Admitting: STUDENT IN AN ORGANIZED HEALTH CARE EDUCATION/TRAINING PROGRAM
Payer: COMMERCIAL

## 2023-02-03 VITALS
OXYGEN SATURATION: 100 % | DIASTOLIC BLOOD PRESSURE: 75 MMHG | RESPIRATION RATE: 16 BRPM | TEMPERATURE: 97.8 F | WEIGHT: 315 LBS | BODY MASS INDEX: 42.66 KG/M2 | HEIGHT: 72 IN | SYSTOLIC BLOOD PRESSURE: 144 MMHG | HEART RATE: 84 BPM

## 2023-02-03 DIAGNOSIS — R10.84 GENERALIZED ABDOMINAL PAIN: ICD-10-CM

## 2023-02-03 DIAGNOSIS — K59.00 CONSTIPATION, UNSPECIFIED CONSTIPATION TYPE: Primary | ICD-10-CM

## 2023-02-03 LAB
QT INTERVAL: 396 MS
QTC INTERVAL: 445 MS

## 2023-02-03 PROCEDURE — 99284 EMERGENCY DEPT VISIT MOD MDM: CPT

## 2023-02-03 RX ORDER — GABAPENTIN 100 MG/1
100 CAPSULE ORAL ONCE
Status: DISCONTINUED | OUTPATIENT
Start: 2023-02-03 | End: 2023-02-03

## 2023-02-03 RX ORDER — POLYETHYLENE GLYCOL 3350 17 G/17G
17 POWDER, FOR SOLUTION ORAL DAILY
Status: DISCONTINUED | OUTPATIENT
Start: 2023-02-03 | End: 2023-02-03 | Stop reason: HOSPADM

## 2023-02-03 RX ADMIN — POLYETHYLENE GLYCOL 3350 17 G: 17 POWDER, FOR SOLUTION ORAL at 04:57

## 2023-02-03 NOTE — DISCHARGE INSTRUCTIONS
Today you are seen for continued symptoms of constipation.  As discussed even though he had a significant bowel movement after the enema I want you to continue to be aggressive about your bowel regimen continue taking your MiraLAX twice daily and all of your stool softeners.  Please call your primary care provider schedule close follow-up appointment for early next week.  If your symptoms worsen prior please return to the emergency department immediately.

## 2023-02-03 NOTE — TELEPHONE ENCOUNTER
"Patient was seen in ED on 02/02/23 and prescribed polyethylene glycol; he took at 2230 and still no results; c/o increase abdominal pain and bloating; teaching on fleet enema and instructed if no results to contact PCP or return to ED.    Reason for Disposition  • Abdomen is more swollen than usual    Additional Information  • Negative: [1] Abdomen pain is main symptom AND [2] male  • Negative: [1] Abdomen pain is main symptom AND [2] adult female  • Negative: Rectal bleeding or blood in stool is main symptom  • Negative: Rectal pain or itching is main symptom  • Negative: Constipation in a cancer patient who is currently (or recently) receiving chemotherapy or radiation therapy, or cancer patient who has metastatic or end-stage cancer and is receiving palliative care  • Negative: Patient sounds very sick or weak to the triager  • Negative: [1] Vomiting AND [2] abdomen looks much more swollen than usual  • Negative: [1] Vomiting AND [2] contains bile (green color)  • Negative: [1] Constant abdominal pain AND [2] present > 2 hours  • Negative: [1] Rectal pain or fullness from fecal impaction (rectum full of stool) AND [2] NOT better after SITZ bath, suppository or enema  • Negative: [1] Intermittent mild abdominal pain AND [2] fever    Answer Assessment - Initial Assessment Questions  1. STOOL PATTERN OR FREQUENCY: \"How often do you pass bowel movements (BMs)?\"  (Normal range: tid to q 3 days)  \"When was the last BM passed?\"           2. STRAINING: \"Do you have to strain to have a BM?\"          3. RECTAL PAIN: \"Does your rectum hurt when the stool comes out?\" If Yes, ask: \"Do you have hemorrhoids? How bad is the pain?\"  (Scale 1-10; or mild, moderate, severe)         4. STOOL COMPOSITION: \"Are the stools hard?\"          5. BLOOD ON STOOLS: \"Has there been any blood on the toilet tissue or on the surface of the BM?\" If Yes, ask: \"When was the last time?\"          6. CHRONIC CONSTIPATION: \"Is this a new problem for " "you?\"  If no, ask: \"How long have you had this problem?\" (days, weeks, months)          7. CHANGES IN DIET OR HYDRATION: \"Have there been any recent changes in your diet?\" \"How much fluids are you drinking consuming on a daily basis?\"  \"How much have you had to drink today?\"         8. MEDICATIONS: \"Have you been taking any new medications?\" \"Are you taking any narcotic pain medications?\" (e.g., Vicoden, Percocet, morphine, dilaudid)         9. LAXATIVES: \"Have you been using any stool softeners, laxatives, or enemas?\"  If yes, ask \"What, how often, and when was the last time?\"  10.ACTIVITY:  \"How much walking do you do every day? on a daily basis?\"  \"Has your activity level decreased in the past week?\"         Polyethylene glycol @ 2230 on 02/02/23  11. CAUSE: \"What do you think is causing the constipation?\"          unsure  12. OTHER SYMPTOMS: \"Do you have any other symptoms?\" (e.g., abdominal pain, bloating, fever, vomiting)        Abdominal pain and bloating  13. MEDICAL HISTORY: \"Do you have a history of hemorrhoids, rectal fissures, or rectal surgery or rectal abscess?\"             14. PREGNANCY: \"Is there any chance you are pregnant?\" \"When was your last menstrual period?\"    Protocols used: CONSTIPATION-ADULT-AH      "

## 2023-02-03 NOTE — ED PROVIDER NOTES
"EMERGENCY DEPARTMENT ATTENDING NOTE    Patient Name: Erick Luong    Chief Complaint   Patient presents with   • Constipation       PATIENT PRESENTATION:  Erick Lunog is a very pleasant 67 y.o. male recently seen in this emergency department just last night diagnosed with constipation after presenting with constipation who returns as his constipation has not been relieved.    On chart review patient had had a KUB which showed significant stool burden.  He is long history of constipation needed been discharged with a GoLytely bowel prep.      On my interview with the patient his wife continued to have some worsening abdominal pain and distention after taking all the GoLytely which is why he ultimately presented.  Had not received any enemas.  Denies any nausea or vomiting no fevers or chills.      PHYSICAL EXAM:   VS: /79 (BP Location: Left arm, Patient Position: Sitting)   Pulse 86   Temp 98.4 °F (36.9 °C) (Oral)   Resp 26   Ht 182.9 cm (72\")   Wt (!) 148 kg (326 lb)   SpO2 97%   BMI 44.21 kg/m²   GENERAL: Uncomfortable appearing elderly gentleman sitting up in stretcher no acute well-nourished, well-developed, awake, alert, no acute distress, nontoxic appearing  EYES: PERRL, sclerae anicteric, extraocular movements grossly intact, symmetric lids  EARS, NOSE, MOUTH, THROAT: atraumatic external nose and ears, moist mucous membranes  NECK: Symmetric, trachea midline, no thyromegaly, no adenopathy, no meningismus  RESPIRATORY: Unlabored respiratory effort, clear to auscultation bilaterally, good air movement  CARDIOVASCULAR: No murmurs or gallops, peripheral pulses 2+ and equal in all extremities  GI: Distended but nontender, bowel sounds present, no hepatosplenomegaly  LYMPHATIC: no lymphadenopathy  MUSCULOSKELETAL/EXTREMITIES: Extremities without obvious deformity, no cyanosis or clubbing  SKIN: warm and dry with no obvious rashes  NEUROLOGIC: moving all 4 extremities symmetrically, CN II-XII grossly " intact  PSYCHIATRIC: alert, pleasant and cooperative. Appropriate mood and affect.      MEDICAL DECISION MAKING:    Erick Luong is a 67 y.o. male with chronic constipation presents emergency department after recently being discharged incentive constipation with continued constipation.    Differential Diagnosis Considered: Constipation, fecal impaction, bowel obstruction    No radiology results for the last day  CT Angiogram Abdomen Pelvis    Result Date: 1/19/2023  1. Mild atherosclerosis. No aneurysm or dissection. Image quality degraded by signal-to-noise artifact related to patient's body habitus. Superior and inferior mesenteric arteries appear patent. 2. Moderate fecal stasis. No evidence for bowel obstruction. No free air or abscess. No prominent abnormal bowel wall thickening identified. 3. Inferior right renal cyst. 4. Cholecystectomy. 5. Diffuse lumbar spinal canal stenosis-likely congenital and acquired spondylosis resulting in stenosis. This report was finalized on 01/19/2023 20:56 by Dr. Yuliana Calhoun MD.    XR Chest 1 View    Result Date: 1/19/2023  1. Findings favoring mild CHF with evidence prior mediastinal surgery. This report was finalized on 01/19/2023 19:21 by Dr. Yuliana Calhoun MD.    CT Angiogram Chest    Result Date: 1/19/2023  1. Signal-to-noise artifact associated with the patient's body habitus slightly limits the study. No convincing pulmonary emboli. 2. Prior coronary bypass surgery. No thoracic aortic aneurysm or dissection. Mild cardiomegaly. 3. Stable 9 mm subpleural right lower lobe pulmonary nodule unchanged from 08/30/2020 supporting a benign process. No routine follow-up required. This report was finalized on 01/19/2023 20:49 by Dr. Yuliana Calhoun MD.      ED Course and Re-evaluation: 66yo M history of chronic constipation and was recently seen in the emergency department just hours prior and discharged with GoLytely cleanout plan in setting of significant constipation but  presents due to abdominal tension and pain.  Upon arrival high suspicion the patient simply with fecal impaction which is contributing to his inability to poop.  May order soapsuds enema.  Patient had extremely large bowel movement with complete resolution of symptoms following.  On my reevaluation patient nontender reporting significant permanent symptoms large bowel movement no blood in his stool.  He already completed GoLytely bowel prep.  Patient is discharged home with plan to follow his primary care provider within 3 days for further management.  Counseled regarding constipation management importance of continuing stool softeners despite his good bowel movement today.  Both patient and his wife at bedside expressed understanding.  Given return precautions emergency department for worsening symptoms.    ED Diagnosis:  Constipation, unspecified constipation type; Generalized abdominal pain    Disposition: to home  Follow up plan: PCP follow up within 2 days, return to ED immediately if symptoms worsen        Signed:  Lamine Beebe MD  Emergency Medicine Physician    Please note that portions of this note were completed with a voice recognition program.      Lamine Beebe MD  02/03/23 [id number]

## 2023-02-03 NOTE — ED PROVIDER NOTES
Subjective   History of Present Illness  Says 67-year-old male who presents emergency room with complaints of constipation and left-sided chest pain.  Patient states that regarding his constipation he has not had a bowel movement in 1 week.  Patient states that he is on opiates.  Patient states that he does have a history of constipation.  Patient states that he has no nausea vomiting.  Patient states that he does have flatulence.  Patient has no fevers or chills.  Patient states that regarding his chest pain that began yesterday as a left-sided sharp chest pain.  Patient states that it comes and goes.  Patient denies any alleviating factors.  Patient denies any exacerbating factors.  Patient denies any radiation of the chest pain.  Patient denies any shortness of breath.  Patient denies any cough.  Patient denies any fevers or chills.  Patient denies any other symptoms at this time.        Review of Systems   Cardiovascular: Positive for chest pain.   Gastrointestinal: Positive for constipation.   All other systems reviewed and are negative.      Past Medical History:   Diagnosis Date   • Arthritis    • Autonomic disease    • CAD (coronary artery disease) 02/06/2017   • Cervical radiculopathy 09/16/2021   • Chronic constipation with acute exaccerbation 05/10/2021   • Coronary artery disease    • Degeneration of cervical intervertebral disc 08/11/2021   • Diabetes mellitus (HCC)    • Diabetic foot ulcer (HCC) 08/31/2020   • Diabetic polyneuropathy associated with type 2 diabetes mellitus (HCC) 01/18/2021   • Elevated cholesterol    • Gastroesophageal reflux disease 05/13/2019   • Headache    • HTN (hypertension), benign 05/03/2017   • Hyperlipidemia    • Hypertension    • Mixed hyperlipidemia 02/07/2017   • Multiple lung nodules 01/26/2020    5mm, 9 mm RLL identified 1/2020, not present 10/2019.   • Myocardial infarction (HCC)    • Osteomyelitis (HCC) 01/22/2020   • Osteomyelitis of fifth toe of right foot (HCC)  "10/07/2019   • Pancreatitis    • Persistent insomnia 01/20/2020   • Renal disorder    • Sleep apnea 02/06/2017   • Sleep apnea with use of continuous positive airway pressure (CPAP)     NON-COMPLIANT   • Slow transit constipation 01/16/2019   • Spinal stenosis in cervical region 09/16/2021   • Vitamin D deficiency 03/02/2021       Allergies   Allergen Reactions   • Cefepime Hives and Anaphylaxis   • Bactrim [Sulfamethoxazole-Trimethoprim] Other (See Comments)     \"RENAL FAILURE\"   • Vancomycin Itching   • Zolpidem Unknown - High Severity     \"makes him crazy\"   • Metronidazole Rash       Past Surgical History:   Procedure Laterality Date   • ABDOMINAL SURGERY     • AMPUTATION FOOT / TOE Left 10/2021    5th digit    • ANTERIOR CERVICAL DISCECTOMY W/ FUSION N/A 8/5/2022    Procedure: CERVICAL DISCECTOMY ANTERIOR WITH FUSION C5-6 with possible plating of C5-7 with neuromonitoring and 1 c-arm;  Surgeon: Karel Soliz MD;  Location:  [facility] OR;  Service: Neurosurgery;  Laterality: N/A;   • APPENDECTOMY     • BACK SURGERY     • CARDIAC CATHETERIZATION Left 02/08/2021    Procedure: Left Heart Cath w poss intervention left anatomical snuff box acess;  Surgeon: Omkar Charles DO;  Location:  [facility] CATH INVASIVE LOCATION;  Service: Cardiology;  Laterality: Left;   • CARDIAC SURGERY     • CATARACT EXTRACTION     • CERVICAL SPINE SURGERY     • COLONOSCOPY N/A 01/31/2017    Normal exam repeat in 5 years   • COLONOSCOPY N/A 02/11/2019    Mild acute inflammation   • COLONOSCOPY W/ POLYPECTOMY  03/04/2014    Hyperplastic polyp   • CORONARY ARTERY BYPASS GRAFT  10/2015   • ENDOSCOPY  04/13/2011    Gastritis with hemorrhage   • ENDOSCOPY N/A 05/05/2017    Normal exam   • ENDOSCOPY N/A 02/11/2019    Gastritis   • ENDOSCOPY N/A 09/01/2020    Non-erosive gastritis with hemorrhage   • ENDOSCOPY N/A 02/10/2021    Esophagitis   • FOOT SURGERY Left    • INCISION AND DRAINAGE OF WOUND Left 09/2007    spider bite       Family " History   Problem Relation Age of Onset   • Colon cancer Father    • Heart disease Father    • Colon cancer Sister    • Colon polyps Sister    • Alzheimer's disease Mother    • Coronary artery disease Sister    • Coronary artery disease Sister        Social History     Socioeconomic History   • Marital status:    Tobacco Use   • Smoking status: Former     Types: Cigarettes     Quit date:      Years since quittin.1   • Smokeless tobacco: Never   • Tobacco comments:     smoked in highschool   Vaping Use   • Vaping Use: Never used   Substance and Sexual Activity   • Alcohol use: No   • Drug use: No   • Sexual activity: Defer           Objective   Physical Exam  Vitals and nursing note reviewed.   Constitutional:       Appearance: He is well-developed.   HENT:      Head: Normocephalic and atraumatic.   Eyes:      Extraocular Movements: Extraocular movements intact.      Pupils: Pupils are equal, round, and reactive to light.   Cardiovascular:      Rate and Rhythm: Normal rate and regular rhythm.      Heart sounds: Normal heart sounds.   Pulmonary:      Effort: Pulmonary effort is normal.      Breath sounds: Normal breath sounds.   Chest:      Chest wall: Tenderness present.   Abdominal:      General: Bowel sounds are normal.      Palpations: Abdomen is soft.      Tenderness: There is no abdominal tenderness.   Musculoskeletal:      Right lower leg: No tenderness. No edema.      Left lower leg: No tenderness. No edema.   Skin:     General: Skin is warm and dry.   Neurological:      General: No focal deficit present.      Mental Status: He is alert and oriented to person, place, and time.   Psychiatric:         Mood and Affect: Mood normal.         Behavior: Behavior normal.         Procedures           ED Course  ED Course as of 23 2147   Thu  EKG rate 76  Sinus rhythm  Nonspecific changes due to artifact [RP]   214 EXAMINATION: XR ABDOMEN FLAT AND UPRIGHT-     2023 7:05 PM  CST     HISTORY: Constipation     2 view abdomen series.  Flat and upright images.     Detail is limited.     There is a moderate amount of fecal material throughout the colon.     No sign of obstruction or free air.     Summary:  1. Moderate fecal material throughout the colon.  This report was finalized on 02/02/2023 19:09 by Dr. Gomez Mcgill MD.        Specimen Collected: 02/02/23 19:09 CST         [RP]   2142 EXAMINATION: XR CHEST 1 VW-     2/2/2023 6:06 PM CST     HISTORY: Chest pain.     One view chest x-ray.     Comparison is made with 01/19/2023.     CABG changes.  Normal heart size.     Adequately expanded lungs with no pneumonia, pneumothorax, or congestive  failure.     No significant pleural fluid is seen.     Summary:  1. No acute disease.     This report was finalized on 02/02/2023 18:29 by Dr. Gomez Mcgill MD. [RP]      ED Course User Index  [RP] Brian Beard MD                                         Lab Results (last 24 hours)     Procedure Component Value Units Date/Time    proBNP [[id number]]  (Normal) Collected: 02/02/23 1731    Specimen: Blood Updated: 02/02/23 1808     proBNP 236.8 pg/mL     Narrative:      Among patients with dyspnea, NT-proBNP is highly sensitive for the detection of acute congestive heart failure. In addition NT-proBNP of <300 pg/ml effectively rules out acute congestive heart failure with 99% negative predictive value.    Results may be falsely decreased if patient taking Biotin.      CBC & Differential [[id number]]  (Abnormal) Collected: 02/02/23 1732    Specimen: Blood Updated: 02/02/23 1839    Narrative:      The following orders were created for panel order CBC & Differential.  Procedure                               Abnormality         Status                     ---------                               -----------         ------                     CBC Auto Differential[[id number]]        Abnormal            Final result                 Please view results for these  tests on the individual orders.    CBC Auto Differential [[id number]]  (Abnormal) Collected: 02/02/23 1732    Specimen: Blood Updated: 02/02/23 1839     WBC 8.76 10*3/mm3      RBC 4.77 10*6/mm3      Hemoglobin 13.2 g/dL      Hematocrit 41.7 %      MCV 87.4 fL      MCH 27.7 pg      MCHC 31.7 g/dL      RDW 13.6 %      RDW-SD 43.3 fl      MPV 12.2 fL      Platelets 242 10*3/mm3      Neutrophil % 68.5 %      Lymphocyte % 19.5 %      Monocyte % 8.3 %      Eosinophil % 2.9 %      Basophil % 0.5 %      Immature Grans % 0.3 %      Neutrophils, Absolute 6.00 10*3/mm3      Lymphocytes, Absolute 1.71 10*3/mm3      Monocytes, Absolute 0.73 10*3/mm3      Eosinophils, Absolute 0.25 10*3/mm3      Basophils, Absolute 0.04 10*3/mm3      Immature Grans, Absolute 0.03 10*3/mm3      nRBC 0.0 /100 WBC     Comprehensive Metabolic Panel [[id number]]  (Abnormal) Collected: 02/02/23 1816    Specimen: Blood Updated: 02/02/23 1908     Glucose 545 mg/dL      BUN 55 mg/dL      Creatinine 2.61 mg/dL      Sodium 131 mmol/L      Potassium 4.4 mmol/L      Chloride 90 mmol/L      CO2 27.0 mmol/L      Calcium 9.1 mg/dL      Total Protein 6.6 g/dL      Albumin 4.1 g/dL      ALT (SGPT) 11 U/L      AST (SGOT) 14 U/L      Alkaline Phosphatase 87 U/L      Total Bilirubin 0.4 mg/dL      Globulin 2.5 gm/dL      A/G Ratio 1.6 g/dL      BUN/Creatinine Ratio 21.1     Anion Gap 14.0 mmol/L      eGFR 26.3 mL/min/1.73     Narrative:      GFR Normal >60  Chronic Kidney Disease <60  Kidney Failure <15      Troponin [[id number]]  (Normal) Collected: 02/02/23 1816    Specimen: Blood Updated: 02/02/23 1844     Troponin T <0.010 ng/mL     Narrative:      Troponin T Reference Range:  <= 0.03 ng/mL-   Negative for AMI  >0.03 ng/mL-     Abnormal for myocardial necrosis.  Clinicians would have to utilize clinical acumen, EKG, Troponin and serial changes to determine if it is an Acute Myocardial Infarction or myocardial injury due to an underlying chronic condition.        Results may be falsely decreased if patient taking Biotin.      Acetone [[id number]]  (Normal) Collected: 02/02/23 1816    Specimen: Blood Updated: 02/02/23 1919     Acetone Negative    Blood Gas, Arterial - [[id number]]  (Abnormal) Collected: 02/02/23 1935    Specimen: Arterial Blood Updated: 02/02/23 1935     Site Right Radial     Dae's Test Positive     pH, Arterial 7.449 pH units      pCO2, Arterial 42.0 mm Hg      pO2, Arterial 81.6 mm Hg      Comment: 84 Value below reference range        HCO3, Arterial 29.1 mmol/L      Comment: 83 Value above reference range        Base Excess, Arterial 4.6 mmol/L      Comment: 83 Value above reference range        O2 Saturation, Arterial 97.2 %      Temperature 37.0 C      Barometric Pressure for Blood Gas 756 mmHg      Modality Room Air     Ventilator Mode NA     Collected by [id number]     Comment: Meter: [id number]     :  DCHESEBR        pCO2, Temperature Corrected 42.0 mm Hg      pH, Temp Corrected 7.449 pH Units      pO2, Temperature Corrected 81.6 mm Hg     Blood Gas, Arterial - [[id number]]  (Abnormal) Collected: 02/02/23 1935    Specimen: Arterial Blood Updated: 02/02/23 1936     Site Right Radial     Dae's Test Positive     pH, Arterial 7.449 pH units      pCO2, Arterial 42.0 mm Hg      pO2, Arterial 81.6 mm Hg      Comment: 84 Value below reference range        HCO3, Arterial 29.1 mmol/L      Comment: 83 Value above reference range        Base Excess, Arterial 4.6 mmol/L      Comment: 83 Value above reference range        O2 Saturation, Arterial 97.2 %      Temperature 37.0 C      Barometric Pressure for Blood Gas 756 mmHg      Modality Room Air     Ventilator Mode NA     Collected by [id number]     Comment: Meter: [id number]     :  DCHESEBR        pCO2, Temperature Corrected 42.0 mm Hg      pH, Temp Corrected 7.449 pH Units      pO2, Temperature Corrected 81.6 mm Hg     Troponin [[id number]]  (Normal) Collected: 02/02/23 2005     Specimen: Blood Updated: 02/02/23 2030     Troponin T <0.010 ng/mL     Narrative:      Troponin T Reference Range:  <= 0.03 ng/mL-   Negative for AMI  >0.03 ng/mL-     Abnormal for myocardial necrosis.  Clinicians would have to utilize clinical acumen, EKG, Troponin and serial changes to determine if it is an Acute Myocardial Infarction or myocardial injury due to an underlying chronic condition.       Results may be falsely decreased if patient taking Biotin.      Urinalysis With Microscopic If Indicated (No Culture) - Urine, Clean Catch [[id number]]  (Abnormal) Collected: 02/02/23 2006    Specimen: Urine, Clean Catch Updated: 02/02/23 2048     Color, UA Yellow     Appearance, UA Clear     pH, UA 6.0     Specific Gravity, UA 1.023     Glucose, UA >=1000 mg/dL (3+)     Ketones, UA Negative     Bilirubin, UA Negative     Blood, UA Negative     Protein,  mg/dL (2+)     Leuk Esterase, UA Negative     Nitrite, UA Negative     Urobilinogen, UA 1.0 E.U./dL    Urinalysis, Microscopic Only - Urine, Clean Catch [[id number]]  (Abnormal) Collected: 02/02/23 2006    Specimen: Urine, Clean Catch Updated: 02/02/23 2048     RBC, UA 0-2 /HPF      WBC, UA None Seen /HPF      Bacteria, UA None Seen /HPF      Squamous Epithelial Cells, UA 0-2 /HPF      Hyaline Casts, UA 0-2 /LPF      Methodology Automated Microscopy    POC Glucose Once [[id number]]  (Abnormal) Collected: 02/02/23 2041    Specimen: Blood Updated: 02/02/23 2053     Glucose 460 mg/dL      Comment: : [id number] Rj Zhu LMeter ID: [id number]       POC Glucose Once [[id number]]  (Abnormal) Collected: 02/02/23 2133    Specimen: Blood Updated: 02/02/23 2145     Glucose 361 mg/dL      Comment: : [id number] Rj Zhu LMeter ID: [id number]               Medical Decision Making  This is a 67-year-old male who presents emergency room with complaints of constipation and chest pain.  Patient does have a heart score of 3.  Patient's chest pain does not  appear to be a cardiac type chest pain as it is a sharp left-sided pain.  Is been going on since yesterday.  Patient's troponin was negative here in the emergency room.  Patient's EKG was negative for any acute finding can emergency room.  Regarding the patient's constipation he was prescribed some medications to help him have a bowel movement.  Patient also was advised that his constipation might be likely due to his opiate use.  Patient did have an elevated blood sugar here in the emergency room.  Patient was given insulin here in the emergency room.  Patient's blood sugars did improve.  I advised patient to follow-up with his primary care provider regarding medical reconciliation of his diabetes medications.  Patient was advised to return to the emergency room with new or worsening symptoms.  Patient verbalized understanding was agreeable plan as discussed.     Chest pain, unspecified type: acute illness or injury  Constipation, unspecified constipation type: acute illness or injury  Hyperglycemia due to diabetes mellitus (HCC): acute illness or injury  Amount and/or Complexity of Data Reviewed  Labs: ordered. Decision-making details documented in ED Course.  Radiology: ordered. Decision-making details documented in ED Course.  ECG/medicine tests: ordered. Decision-making details documented in ED Course.      Risk  OTC drugs.  Prescription drug management.          Final diagnoses:   Constipation, unspecified constipation type   Chest pain, unspecified type   Hyperglycemia due to diabetes mellitus (HCC)       ED Disposition  ED Disposition     ED Disposition   Discharge    Condition   Stable    Comment   --             Del Shetty MD  [facility] [address]  [phone]    Call       [facility] Emergency Department  [address] [place] [id number] [phone]    As needed, If symptoms worsen         Medication List      New Prescriptions    polyethylene glycol  236 g solution  Commonly known as: GoLYTELY  Take 240 mL by mouth 1 (One) Time for 1 dose.           Where to Get Your Medications      These medications were sent to [facility] #[id number] - [place], [place] - [address] - [phone]  - [phone]   [address] [place] [address]    Phone: [phone]   · polyethylene glycol 236 g solution          Brian Beard MD  02/02/23 8657

## 2023-02-07 ENCOUNTER — READMISSION MANAGEMENT (OUTPATIENT)
Dept: CALL CENTER | Facility: HOSPITAL | Age: 68
End: 2023-02-07
Payer: COMMERCIAL

## 2023-02-07 NOTE — OUTREACH NOTE
Medical Week 2 Survey    Flowsheet Row Responses   [facility] facility patient discharged from? [place]   Does the patient have one of the following disease processes/diagnoses(primary or secondary)? Other   Week 2 attempt successful? No   Unsuccessful attempts Attempt 1          Dalila RHODES - Registered Nurse

## 2023-02-15 ENCOUNTER — TELEPHONE (OUTPATIENT)
Dept: NEUROSURGERY | Facility: CLINIC | Age: 68
End: 2023-02-15
Payer: COMMERCIAL

## 2023-02-15 NOTE — TELEPHONE ENCOUNTER
Caller: Joan Luong    Relationship to patient: Emergency Contact    Best call back number: [phone]    Patient is needing:       PATIENT'S WIFE CALLING IN TO SEE IF SHE CAN GET PATIENT IN SOONER THAN 3/16    PATIENT EXPERIENCING THE FOLLOWING:    CONSTANT PAIN IN MIDDLE OF NECK, GOTTEN WORSE IN PAST WEEK.  LEFT SIDED UE NUMBNESS.  PATIENT REPORTS FEELING LIKE SOMETHING SNAPPED IN NECK.  PAIN 8-10/10  TAKING FLEXERIL AND NORCO, NOT HELPING MUCH.    PLEASE CALL TO ADVISE

## 2023-02-16 ENCOUNTER — HOSPITAL ENCOUNTER (OUTPATIENT)
Dept: GENERAL RADIOLOGY | Facility: HOSPITAL | Age: 68
Discharge: HOME | End: 2023-02-16
Admitting: NURSE PRACTITIONER
Payer: COMMERCIAL

## 2023-02-16 ENCOUNTER — OFFICE VISIT (OUTPATIENT)
Dept: NEUROSURGERY | Facility: CLINIC | Age: 68
End: 2023-02-16
Payer: COMMERCIAL

## 2023-02-16 VITALS — WEIGHT: 315 LBS | HEIGHT: 72 IN | BODY MASS INDEX: 42.66 KG/M2

## 2023-02-16 DIAGNOSIS — M50.30 DEGENERATION OF CERVICAL INTERVERTEBRAL DISC: ICD-10-CM

## 2023-02-16 DIAGNOSIS — G95.9 CERVICAL MYELOPATHY: ICD-10-CM

## 2023-02-16 DIAGNOSIS — M54.12 CERVICAL RADICULOPATHY: ICD-10-CM

## 2023-02-16 DIAGNOSIS — E66.01 CLASS 3 SEVERE OBESITY DUE TO EXCESS CALORIES WITHOUT SERIOUS COMORBIDITY WITH BODY MASS INDEX (BMI) OF 40.0 TO 44.9 IN ADULT: ICD-10-CM

## 2023-02-16 DIAGNOSIS — G95.9 CERVICAL MYELOPATHY: Primary | ICD-10-CM

## 2023-02-16 DIAGNOSIS — Z78.9 NONSMOKER: ICD-10-CM

## 2023-02-16 PROCEDURE — 72052 X-RAY EXAM NECK SPINE 6/>VWS: CPT

## 2023-02-16 PROCEDURE — 99213 OFFICE O/P EST LOW 20 MIN: CPT | Performed by: NURSE PRACTITIONER

## 2023-02-16 RX ORDER — GABAPENTIN 100 MG/1
100 CAPSULE ORAL 2 TIMES DAILY
COMMUNITY

## 2023-02-16 RX ORDER — LOSARTAN POTASSIUM 100 MG/1
TABLET ORAL
COMMUNITY
End: 2023-02-27 | Stop reason: HOSPADM

## 2023-02-16 RX ORDER — DOXYCYCLINE HYCLATE 100 MG/1
100 CAPSULE ORAL DAILY
Status: ON HOLD | COMMUNITY
End: 2023-02-25

## 2023-02-16 RX ORDER — ASPIRIN 81 MG/1
TABLET, CHEWABLE ORAL EVERY 24 HOURS
COMMUNITY
End: 2023-02-16

## 2023-02-16 RX ORDER — SODIUM PHOSPHATE,MONO-DIBASIC 19G-7G/118
133 ENEMA (ML) RECTAL
COMMUNITY
End: 2023-02-27 | Stop reason: HOSPADM

## 2023-02-16 RX ORDER — POLYETHYLENE GLYCOL-3350 AND ELECTROLYTES 236; 6.74; 5.86; 2.97; 22.74 G/274.31G; G/274.31G; G/274.31G; G/274.31G; G/274.31G
POWDER, FOR SOLUTION ORAL
COMMUNITY

## 2023-02-16 RX ORDER — COLCHICINE 0.6 MG/1
0.6 TABLET ORAL DAILY
COMMUNITY

## 2023-02-16 RX ORDER — NEBIVOLOL 5 MG/1
TABLET ORAL
COMMUNITY

## 2023-02-16 RX ORDER — METOLAZONE 2.5 MG/1
TABLET ORAL
COMMUNITY
End: 2023-02-27 | Stop reason: HOSPADM

## 2023-02-16 RX ORDER — MIDODRINE HYDROCHLORIDE 10 MG/1
TABLET ORAL
COMMUNITY
End: 2023-02-27 | Stop reason: HOSPADM

## 2023-02-16 RX ORDER — POLYETHYLENE GLYCOL 3350, SODIUM CHLORIDE, SODIUM BICARBONATE, POTASSIUM CHLORIDE 420; 11.2; 5.72; 1.48 G/4L; G/4L; G/4L; G/4L
POWDER, FOR SOLUTION ORAL SEE ADMIN INSTRUCTIONS
COMMUNITY
Start: 2023-02-02 | End: 2023-02-27 | Stop reason: HOSPADM

## 2023-02-16 RX ORDER — CLONIDINE HYDROCHLORIDE 0.1 MG/1
0.1 TABLET ORAL EVERY 12 HOURS SCHEDULED
COMMUNITY
End: 2023-02-27 | Stop reason: HOSPADM

## 2023-02-16 RX ORDER — IRBESARTAN 150 MG/1
150 TABLET ORAL DAILY
COMMUNITY
End: 2023-02-27 | Stop reason: HOSPADM

## 2023-02-16 NOTE — PROGRESS NOTES
"    Chief complaint:   Chief Complaint   Patient presents with   • Neck Pain     Pt here for neck/headaches pain f/u/e. Pt states he is having some numbness in his bilateral hands. Pt has not had any physical therapy, pain mgmt or seen a chiropractor.         Subjective     HPI: This is a 67-year-old male gentleman who went to the operating room on August 5, 2022 for an extension of C6-7 fusion to C5-6 for cervical myelopathy along with neck pain and left arm pain and numbness and tingling.  At his last office appointment on January 18, 2023 the patient says that he was not having any issues with any neck pain.  He was not having any radicular arm pain.  Was complaining of bilateral hand numbness and tingling and he was set up to do a nerve conduction study however this has not been completed yet.  He comes in today for further evaluation.  The patient says that he has been in the hospital couple times since his last appointment in January.  Most recently he was doing with the prolonged bout of constipation.  He says about 2 weeks ago he started having worsening neck pain.  There is no accident or injury associated this.  Pain in his neck is constant.  It is going from his neck over into his right shoulder.  Denies any pain past his shoulders.  He also is complaining of headaches.  Says the pain in his neck is worse with certain movements and better with looking up.  He continues to complain of bilateral hand numbness and tingling.  Denies any bowel or bladder incontinence.  He has not done any conservative treatment for this new onset of pain.  He does take hydrocodone and gabapentin.    Review of Systems   Musculoskeletal: Positive for myalgias and neck pain.   Neurological: Positive for numbness. Negative for weakness.         Objective      Vital Signs  Ht 182.9 cm (72\")   Wt (!) 148 kg (326 lb)   BMI 44.21 kg/m²     Physical Exam  Constitutional:       Appearance: He is well-developed.   HENT:      Head: " Normocephalic.   Eyes:      Extraocular Movements: EOM normal.      Pupils: Pupils are equal, round, and reactive to light.   Pulmonary:      Effort: Pulmonary effort is normal.   Musculoskeletal:         General: Normal range of motion.      Cervical back: Normal range of motion.      Comments: Neck and right shoulder pain   Skin:     General: Skin is warm.   Neurological:      Mental Status: He is alert and oriented to person, place, and time.      GCS: GCS eye subscore is 4. GCS verbal subscore is 5. GCS motor subscore is 6.      Cranial Nerves: No cranial nerve deficit.      Sensory: No sensory deficit.      Motor: Motor strength is normal.      Gait: Gait is intact. Gait normal.      Deep Tendon Reflexes: Reflexes are normal and symmetric.   Psychiatric:         Speech: Speech normal.         Behavior: Behavior normal.         Thought Content: Thought content normal.         Neurologic Exam     Mental Status   Oriented to person, place, and time.   Attention: normal. Concentration: normal.   Speech: speech is normal   Level of consciousness: alert  Normal comprehension.     Cranial Nerves     CN II   Visual fields full to confrontation.     CN III, IV, VI   Pupils are equal, round, and reactive to light.  Extraocular motions are normal.     CN V   Facial sensation intact.     CN VII   Facial expression full, symmetric.     CN VIII   CN VIII normal.     CN IX, X   CN IX normal.   CN X normal.     CN XI   CN XI normal.     CN XII   CN XII normal.     Motor Exam   Muscle bulk: normal    Strength   Strength 5/5 throughout.     Sensory Exam   Light touch normal.     Gait, Coordination, and Reflexes     Gait  Gait: normal    Reflexes   Reflexes 2+ except as noted.       Imaging review: No new imaging        Assessment/Plan: The patient is complaining of new onset of worsening neck pain over to his right shoulder.  I am going to send the patient for set of x-rays of the cervical spine.  I will also order dedicated  course of physical therapy consisting of 2-3 times a week for 4 to 6 weeks.  She did not make any improvement from the therapy we would recommend getting an MRI.  I also did encourage them to get set up to do the nerve conduction study.  They were told to call us with any further problems or concerns    Patient is a nonsmoker  The patient's Body mass index is 44.21 kg/m².. BMI is above normal parameters. Recommendations include: educational material and nutrition counseling  Advance Care Planning   ACP discussion was held with the patient during this visit. Patient does not have an advance directive, information provided.   STEADI Fall Risk Assessment was completed, and patient is at LOW risk for falls.Assessment completed on:1/18/2023     Diagnoses and all orders for this visit:    1. Cervical myelopathy (HCC) (Primary)  -     Ambulatory Referral to Physical Therapy Evaluate and treat, Neuro; Strengthening, ROM, Stretching; Full weight bearing  -     XR Spine Cervical Complete With Obli Flex Ext; Future    2. Degeneration of cervical intervertebral disc  -     Ambulatory Referral to Physical Therapy Evaluate and treat, Neuro; Strengthening, ROM, Stretching; Full weight bearing  -     XR Spine Cervical Complete With Obli Flex Ext; Future    3. Cervical radiculopathy  -     Ambulatory Referral to Physical Therapy Evaluate and treat, Neuro; Strengthening, ROM, Stretching; Full weight bearing  -     XR Spine Cervical Complete With Obli Flex Ext; Future    4. Nonsmoker    5. Class 3 severe obesity due to excess calories without serious comorbidity with body mass index (BMI) of 40.0 to 44.9 in adult (HCC)        I discussed the patients findings and my recommendations with patient  Willy Romero, APRN  02/16/23  11:25 CST

## 2023-02-16 NOTE — PATIENT INSTRUCTIONS
Advance Care Planning and Advance Directives     You make decisions on a daily basis - decisions about where you want to live, your career, your home, your life. Perhaps one of the most important decisions you face is your choice for future medical care. Take time to talk with your family and your healthcare team and start planning today.  Advance Care Planning is a process that can help you:  Understand possible future healthcare decisions in light of your own experiences  Reflect on those decision in light of your goals and values  Discuss your decisions with those closest to you and the healthcare professionals that care for you  Make a plan by creating a document that reflects your wishes    Surrogate Decision Maker  In the event of a medical emergency, which has left you unable to communicate or to make your own decisions, you would need someone to make decisions for you.  It is important to discuss your preferences for medical treatment with this person while you are in good health.     Qualities of a surrogate decision maker:  Willing to take on this role and responsibility  Knows what you want for future medical care  Willing to follow your wishes even if they don't agree with them  Able to make difficult medical decisions under stressful circumstances    Advance Directives  These are legal documents you can create that will guide your healthcare team and decision maker(s) when needed. These documents can be stored in the electronic medical record.    Living Will - a legal document to guide your care if you have a terminal condition or a serious illness and are unable to communicate. States vary by statute in document names/types, but most forms may include one or more of the following:        -  Directions regarding life-prolonging treatments        -  Directions regarding artificially provided nutrition/hydration        -  Choosing a healthcare decision maker        -  Direction regarding organ/tissue  donation    Durable Power of  for Healthcare - this document names an -in-fact to make medical decisions for you, but it may also allow this person to make personal and financial decisions for you. Please seek the advice of an  if you need this type of document.    **Advance Directives are not required and no one may discriminate against you if you do not sign one.    Medical Orders  Many states allow specific forms/orders signed by your physician to record your wishes for medical treatment in your current state of health. This form, signed in personal communication with your physician, addresses resuscitation and other medical interventions that you may or may not want.      For more information or to schedule a time with a [facility] Advance Care Planning Facilitator contact: [facility] or call [phone] and someone will contact you directly.BMI for Adults  What is BMI?  Body mass index (BMI) is a number that is calculated from a person's weight and height. BMI can help estimate how much of a person's weight is composed of fat. BMI does not measure body fat directly. Rather, it is an alternative to procedures that directly measure body fat, which can be difficult and expensive.  BMI can help identify people who may be at higher risk for certain medical problems.  What are BMI measurements used for?  BMI is used as a screening tool to identify possible weight problems. It helps determine whether a person is obese, overweight, a healthy weight, or underweight.  BMI is useful for:  Identifying a weight problem that may be related to a medical condition or may increase the risk for medical problems.  Promoting changes, such as changes in diet and exercise, to help reach a healthy weight. BMI screening can be repeated to see if these changes are working.  How is BMI calculated?  BMI involves measuring your weight in relation to your height. Both height and weight are measured,  "and the BMI is calculated from those numbers. This can be done either in English ([place] or metric measurements. Note that charts and online BMI calculators are available to help you find your BMI quickly and easily without having to do these calculations yourself.  To calculate your BMI in English ([place] measurements:    Measure your weight in pounds (lb).  Multiply the number of pounds by 703.  For example, for a person who weighs 180 lb, multiply that number by 703, which equals 126,540.  Measure your height in inches. Then multiply that number by itself to get a measurement called \"inches squared.\"  For example, for a person who is 70 inches tall, the \"inches squared\" measurement is 70 inches x 70 inches, which equals 4,900 inches squared.  Divide the total from step 2 (number of lb x 703) by the total from step 3 (inches squared): 126,540 ÷ 4,900 = 25.8. This is your BMI.    To calculate your BMI in metric measurements:  Measure your weight in kilograms (kg).  Measure your height in meters (m). Then multiply that number by itself to get a measurement called \"meters squared.\"  For example, for a person who is 1.75 m tall, the \"meters squared\" measurement is 1.75 m x 1.75 m, which is equal to 3.1 meters squared.  Divide the number of kilograms (your weight) by the meters squared number. In this example: 70 ÷ 3.1 = 22.6. This is your BMI.  What do the results mean?  BMI charts are used to identify whether you are underweight, normal weight, overweight, or obese. The following guidelines will be used:  Underweight: BMI less than 18.5.  Normal weight: BMI between 18.5 and 24.9.  Overweight: BMI between 25 and 29.9.  Obese: BMI of 30 or above.  Keep these notes in mind:  Weight includes both fat and muscle, so someone with a muscular build, such as an athlete, may have a BMI that is higher than 24.9. In cases like these, BMI is not an accurate measure of body fat.  To determine if excess body fat is the cause of a BMI " of 25 or higher, further assessments may need to be done by a health care provider.  BMI is usually interpreted in the same way for men and women.  Where to find more information  For more information about BMI, including tools to quickly calculate your BMI, go to these websites:  Centers for Disease Control and Prevention: www.cdc.gov  American Heart Association: www.heart.org  National Heart, Lung, and Blood [place]: www.nhlbi.nih.gov  Summary  Body mass index (BMI) is a number that is calculated from a person's weight and height.  BMI may help estimate how much of a person's weight is composed of fat. BMI can help identify those who may be at higher risk for certain medical problems.  BMI can be measured using English measurements or metric measurements.  BMI charts are used to identify whether you are underweight, normal weight, overweight, or obese.  This information is not intended to replace advice given to you by your health care provider. Make sure you discuss any questions you have with your health care provider.  Document Revised: 09/09/2020 Document Reviewed: 07/17/2020  [employer] Patient Education © 2021 [employer]

## 2023-02-17 DIAGNOSIS — M54.12 CERVICAL RADICULOPATHY: ICD-10-CM

## 2023-02-17 DIAGNOSIS — M50.30 DEGENERATION OF CERVICAL INTERVERTEBRAL DISC: ICD-10-CM

## 2023-02-17 DIAGNOSIS — G95.9 CERVICAL MYELOPATHY: Primary | ICD-10-CM

## 2023-02-17 NOTE — TELEPHONE ENCOUNTER
Placed a call to inform pt per Willy pt xrays look good pt will need to go to physical therapy. Per pt [facility] physcal therapy called yesterday and stated it would be March before pt could get in for an appt. Asked pt would he like to go somewhere else pt stated he really wanted to go to [facility] but he was put on a waiting list and he's hurting and doesn't want to wait pt stated he would like to try [place] here in [place]. Informed pt I would send a message to Willy, once information has been reviewed Willy or myself would give pt a call with an update. Ended call with pt acknowledgement.

## 2023-02-20 NOTE — TELEPHONE ENCOUNTER
Placed  A call to inform pt his therapy order has been signed and faxed. Ended call with pt acknowledgement.

## 2023-02-22 ENCOUNTER — LAB (OUTPATIENT)
Dept: LAB | Facility: HOSPITAL | Age: 68
End: 2023-02-22
Payer: COMMERCIAL

## 2023-02-22 ENCOUNTER — TRANSCRIBE ORDERS (OUTPATIENT)
Dept: ADMINISTRATIVE | Facility: HOSPITAL | Age: 68
End: 2023-02-22
Payer: COMMERCIAL

## 2023-02-22 ENCOUNTER — OFFICE VISIT (OUTPATIENT)
Dept: GASTROENTEROLOGY | Facility: CLINIC | Age: 68
End: 2023-02-22
Payer: COMMERCIAL

## 2023-02-22 VITALS
OXYGEN SATURATION: 99 % | SYSTOLIC BLOOD PRESSURE: 122 MMHG | WEIGHT: 315 LBS | HEART RATE: 93 BPM | DIASTOLIC BLOOD PRESSURE: 70 MMHG | BODY MASS INDEX: 42.66 KG/M2 | TEMPERATURE: 97.3 F | HEIGHT: 72 IN

## 2023-02-22 DIAGNOSIS — Z79.4 TYPE 2 DIABETES MELLITUS WITH HYPERGLYCEMIA, WITH LONG-TERM CURRENT USE OF INSULIN: ICD-10-CM

## 2023-02-22 DIAGNOSIS — N18.4 CHRONIC KIDNEY DISEASE, STAGE 4 (SEVERE): ICD-10-CM

## 2023-02-22 DIAGNOSIS — F03.B0 MODERATE DEMENTIA WITHOUT BEHAVIORAL DISTURBANCE, PSYCHOTIC DISTURBANCE, MOOD DISTURBANCE, OR ANXIETY, UNSPECIFIED DEMENTIA TYPE: ICD-10-CM

## 2023-02-22 DIAGNOSIS — E11.65 TYPE 2 DIABETES MELLITUS WITH HYPERGLYCEMIA, WITH LONG-TERM CURRENT USE OF INSULIN: ICD-10-CM

## 2023-02-22 DIAGNOSIS — N40.1 BENIGN PROSTATIC HYPERPLASIA WITH LOWER URINARY TRACT SYMPTOMS, SYMPTOM DETAILS UNSPECIFIED: ICD-10-CM

## 2023-02-22 DIAGNOSIS — K21.00 GASTROESOPHAGEAL REFLUX DISEASE WITH ESOPHAGITIS WITHOUT HEMORRHAGE: ICD-10-CM

## 2023-02-22 DIAGNOSIS — K59.01 SLOW TRANSIT CONSTIPATION: Primary | ICD-10-CM

## 2023-02-22 DIAGNOSIS — E11.42 TYPE 2 DIABETES MELLITUS WITH DIABETIC POLYNEUROPATHY, UNSPECIFIED WHETHER LONG TERM INSULIN USE: ICD-10-CM

## 2023-02-22 DIAGNOSIS — I50.33 ACUTE ON CHRONIC DIASTOLIC CHF (CONGESTIVE HEART FAILURE): ICD-10-CM

## 2023-02-22 DIAGNOSIS — E11.42 TYPE 2 DIABETES MELLITUS WITH DIABETIC POLYNEUROPATHY, UNSPECIFIED WHETHER LONG TERM INSULIN USE: Primary | ICD-10-CM

## 2023-02-22 DIAGNOSIS — Z78.9 NONSMOKER: ICD-10-CM

## 2023-02-22 DIAGNOSIS — G47.30 SLEEP APNEA WITH USE OF CONTINUOUS POSITIVE AIRWAY PRESSURE (CPAP): ICD-10-CM

## 2023-02-22 LAB
25(OH)D3 SERPL-MCNC: 20.5 NG/ML (ref 30–100)
ALBUMIN SERPL-MCNC: 4 G/DL (ref 3.5–5.2)
ALBUMIN UR-MCNC: 41.7 MG/DL
ALBUMIN/GLOB SERPL: 1.4 G/DL
ALP SERPL-CCNC: 72 U/L (ref 39–117)
ALT SERPL W P-5'-P-CCNC: 11 U/L (ref 1–41)
ANION GAP SERPL CALCULATED.3IONS-SCNC: 13.4 MMOL/L (ref 5–15)
AST SERPL-CCNC: 13 U/L (ref 1–40)
BASOPHILS # BLD AUTO: 0.06 10*3/MM3 (ref 0–0.2)
BASOPHILS NFR BLD AUTO: 0.8 % (ref 0–1.5)
BILIRUB SERPL-MCNC: 0.4 MG/DL (ref 0–1.2)
BUN SERPL-MCNC: 68 MG/DL (ref 8–23)
BUN/CREAT SERPL: 19 (ref 7–25)
CALCIUM SPEC-SCNC: 8.9 MG/DL (ref 8.6–10.5)
CHLORIDE SERPL-SCNC: 101 MMOL/L (ref 98–107)
CO2 SERPL-SCNC: 25.6 MMOL/L (ref 22–29)
CREAT SERPL-MCNC: 3.58 MG/DL (ref 0.76–1.27)
CREAT UR-MCNC: 96.1 MG/DL
DEPRECATED RDW RBC AUTO: 41.9 FL (ref 37–54)
EGFRCR SERPLBLD CKD-EPI 2021: 18 ML/MIN/1.73
EOSINOPHIL # BLD AUTO: 0.58 10*3/MM3 (ref 0–0.4)
EOSINOPHIL NFR BLD AUTO: 7.4 % (ref 0.3–6.2)
ERYTHROCYTE [DISTWIDTH] IN BLOOD BY AUTOMATED COUNT: 13.4 % (ref 12.3–15.4)
FOLATE SERPL-MCNC: 17.4 NG/ML (ref 4.78–24.2)
GLOBULIN UR ELPH-MCNC: 2.9 GM/DL
GLUCOSE SERPL-MCNC: 69 MG/DL (ref 65–99)
HBA1C MFR BLD: 10.9 % (ref 4.8–5.6)
HCT VFR BLD AUTO: 35.8 % (ref 37.5–51)
HGB BLD-MCNC: 12.1 G/DL (ref 13–17.7)
IMM GRANULOCYTES # BLD AUTO: 0.03 10*3/MM3 (ref 0–0.05)
IMM GRANULOCYTES NFR BLD AUTO: 0.4 % (ref 0–0.5)
LYMPHOCYTES # BLD AUTO: 1.62 10*3/MM3 (ref 0.7–3.1)
LYMPHOCYTES NFR BLD AUTO: 20.7 % (ref 19.6–45.3)
MCH RBC QN AUTO: 28.8 PG (ref 26.6–33)
MCHC RBC AUTO-ENTMCNC: 33.8 G/DL (ref 31.5–35.7)
MCV RBC AUTO: 85.2 FL (ref 79–97)
MICROALBUMIN/CREAT UR: 433.9 MG/G
MONOCYTES # BLD AUTO: 0.68 10*3/MM3 (ref 0.1–0.9)
MONOCYTES NFR BLD AUTO: 8.7 % (ref 5–12)
NEUTROPHILS NFR BLD AUTO: 4.86 10*3/MM3 (ref 1.7–7)
NEUTROPHILS NFR BLD AUTO: 62 % (ref 42.7–76)
NRBC BLD AUTO-RTO: 0 /100 WBC (ref 0–0.2)
PLATELET # BLD AUTO: 214 10*3/MM3 (ref 140–450)
PMV BLD AUTO: 11.9 FL (ref 6–12)
POTASSIUM SERPL-SCNC: 4.2 MMOL/L (ref 3.5–5.2)
PROT SERPL-MCNC: 6.9 G/DL (ref 6–8.5)
PSA SERPL-MCNC: 0.35 NG/ML (ref 0–4)
PTH-INTACT SERPL-MCNC: 164 PG/ML (ref 15–65)
RBC # BLD AUTO: 4.2 10*6/MM3 (ref 4.14–5.8)
SODIUM SERPL-SCNC: 140 MMOL/L (ref 136–145)
TSH SERPL DL<=0.05 MIU/L-ACNC: 1.44 UIU/ML (ref 0.27–4.2)
VIT B12 BLD-MCNC: 366 PG/ML (ref 211–946)
WBC NRBC COR # BLD: 7.83 10*3/MM3 (ref 3.4–10.8)

## 2023-02-22 PROCEDURE — 83036 HEMOGLOBIN GLYCOSYLATED A1C: CPT

## 2023-02-22 PROCEDURE — 82570 ASSAY OF URINE CREATININE: CPT

## 2023-02-22 PROCEDURE — 82746 ASSAY OF FOLIC ACID SERUM: CPT

## 2023-02-22 PROCEDURE — 82306 VITAMIN D 25 HYDROXY: CPT

## 2023-02-22 PROCEDURE — G0103 PSA SCREENING: HCPCS

## 2023-02-22 PROCEDURE — 99214 OFFICE O/P EST MOD 30 MIN: CPT | Performed by: CLINICAL NURSE SPECIALIST

## 2023-02-22 PROCEDURE — 36415 COLL VENOUS BLD VENIPUNCTURE: CPT

## 2023-02-22 PROCEDURE — 82607 VITAMIN B-12: CPT

## 2023-02-22 PROCEDURE — 82043 UR ALBUMIN QUANTITATIVE: CPT

## 2023-02-22 PROCEDURE — 84153 ASSAY OF PSA TOTAL: CPT

## 2023-02-22 PROCEDURE — 80050 GENERAL HEALTH PANEL: CPT

## 2023-02-22 PROCEDURE — 83970 ASSAY OF PARATHORMONE: CPT

## 2023-02-22 NOTE — PROGRESS NOTES
Erick Luong  1955 2/22/2023  Chief Complaint   Patient presents with   • GI Problem     Constipation and Tarry stools     Subjective   HPI  Erick Luong is a 67 y.o. male who presents with a complaint of recent presentation to the ER with fecal impaction severe constipation.  He went to the ER on 2/3/23 after no BM for 2 weeks.   He was just admitted with fluid overload. He says he had a reaction to Bactrim.   H/H is normal 2/2/23 13.2/41.7 MCV 87.4  Followed by Dr Garay and his note from 1/25/23 68 y/o male with history of chronic diastolic CHF, coronary artery disease, and hypertension admitted 1/19 for shortness of breath. Notes include acute on chronic diastolic heart failure. Echocardiogram performed 1/20 includes estimated ejection fraction of 51 - 55 %. Patient was treated with monitoring, fluid restriction, low sodium diet, IV and PO bumetanide, carvedilol, and losartan.      He has had a soap suds enema and he did have results.   His wife is with him today and tells me he is having difficulty with his blood pressure. He had neck surgery 8/5/22 and has had some persistent pain from that. His sugar is not regulated.     Past Medical History:   Diagnosis Date   • Arthritis    • Autonomic disease    • CAD (coronary artery disease) 02/06/2017   • Cervical radiculopathy 09/16/2021   • Chronic constipation with acute exaccerbation 05/10/2021   • Coronary artery disease    • Degeneration of cervical intervertebral disc 08/11/2021   • Diabetes mellitus (HCC)    • Diabetic foot ulcer (HCC) 08/31/2020   • Diabetic polyneuropathy associated with type 2 diabetes mellitus (HCC) 01/18/2021   • Elevated cholesterol    • Gastroesophageal reflux disease 05/13/2019   • Headache    • HTN (hypertension), benign 05/03/2017   • Hyperlipidemia    • Hypertension    • Mixed hyperlipidemia 02/07/2017   • Multiple lung nodules 01/26/2020    5mm, 9 mm RLL identified 1/2020, not present 10/2019.   • Myocardial infarction  (HCC)    • Osteomyelitis ([facility]) 01/22/2020   • Osteomyelitis of fifth toe of right foot (HCC) 10/07/2019   • Pancreatitis    • Persistent insomnia 01/20/2020   • Renal disorder    • Sleep apnea 02/06/2017   • Sleep apnea with use of continuous positive airway pressure (CPAP)     NON-COMPLIANT   • Slow transit constipation 01/16/2019   • Spinal stenosis in cervical region 09/16/2021   • Vitamin D deficiency 03/02/2021     Past Surgical History:   Procedure Laterality Date   • ABDOMINAL SURGERY     • AMPUTATION FOOT / TOE Left 10/2021    5th digit    • ANTERIOR CERVICAL DISCECTOMY W/ FUSION N/A 8/5/2022    Procedure: CERVICAL DISCECTOMY ANTERIOR WITH FUSION C5-6 with possible plating of C5-7 with neuromonitoring and 1 c-arm;  Surgeon: Karel Soliz MD;  Location:  [facility] OR;  Service: Neurosurgery;  Laterality: N/A;   • APPENDECTOMY     • BACK SURGERY     • CARDIAC CATHETERIZATION Left 02/08/2021    Procedure: Left Heart Cath w poss intervention left anatomical snuff box acess;  Surgeon: Omkar Charles DO;  Location:  [facility] CATH INVASIVE LOCATION;  Service: Cardiology;  Laterality: Left;   • CARDIAC SURGERY     • CATARACT EXTRACTION     • CERVICAL SPINE SURGERY     • COLONOSCOPY N/A 01/31/2017    Normal exam repeat in 5 years   • COLONOSCOPY N/A 02/11/2019    Mild acute inflammation   • COLONOSCOPY W/ POLYPECTOMY  03/04/2014    Hyperplastic polyp   • CORONARY ARTERY BYPASS GRAFT  10/2015   • ENDOSCOPY  04/13/2011    Gastritis with hemorrhage   • ENDOSCOPY N/A 05/05/2017    Normal exam   • ENDOSCOPY N/A 02/11/2019    Gastritis   • ENDOSCOPY N/A 09/01/2020    Non-erosive gastritis with hemorrhage   • ENDOSCOPY N/A 02/10/2021    Esophagitis   • FOOT SURGERY Left    • INCISION AND DRAINAGE OF WOUND Left 09/2007    spider bite       Outpatient Medications Marked as Taking for the 2/22/23 encounter (Office Visit) with Yuliana Vinson APRN   Medication Sig Dispense Refill   • albuterol sulfate  (90  Base) MCG/ACT inhaler Inhale 2 puffs Every 4 (Four) Hours As Needed for Wheezing. 8 g 1   • amitriptyline (ELAVIL) 25 MG tablet Take 2 tablets by mouth Daily at bedtime. 60 tablet 1   • Aspirin 81 MG capsule Take 81 mg by mouth Daily.     • Azelastine HCl 137 MCG/SPRAY solution Use 2 sprays into the nostril(s) as directed by provider 2 (Two) Times a Day. 30 mL 3   • bumetanide (BUMEX) 2 MG tablet Take 1 tablet by mouth 2 (Two) Times a Day for 30 days. 60 tablet 0   • busPIRone (BUSPAR) 10 MG tablet Take 1 tablet by mouth 3 (Three) Times a Day As Needed. 270 tablet 4   • carvedilol (COREG) 6.25 MG tablet Take 1 tablet by mouth 2 (Two) Times a Day. 180 tablet 4   • Cholecalciferol 125 MCG (5000 UT) tablet Take 1 tablet by mouth Daily with meal 30 tablet 2   • cloNIDine (CATAPRES) 0.1 MG tablet Every 12 (Twelve) Hours.     • colchicine 0.6 MG tablet 1 tablet Orally     • Continuous Blood Gluc  (Dexcom G6 ) device Take As Directed. 1 each 2   • Continuous Blood Gluc Sensor (Dexcom G6 Sensor) Use As Directed. 1 each 2   • Continuous Blood Gluc Transmit (Dexcom G6 Transmitter) misc Use As Directed. 1 each 2   • cyclobenzaprine (FLEXERIL) 5 MG tablet Take 1 tablet by mouth 2 (Two) Times a Day As Needed for muscle spasms 60 tablet 5   • donepezil (ARICEPT) 10 MG tablet Take 1 tablet by mouth Daily. 90 tablet 4   • doxycycline (VIBRAMYCIN) 100 MG capsule doxycycline hyclate 100 mg capsule     • Dulaglutide (Trulicity) 3 MG/0.5ML solution pen-injector Inject 3 mg under the skin into the appropriate area as directed Every 7 (Seven) Days. 4 mL 11   • DULoxetine (CYMBALTA) 60 MG capsule Take 1 capsule by mouth Daily. 90 capsule 4   • fexofenadine (ALLEGRA) 180 MG tablet Take 1 tablet by mouth Daily. 30 tablet 2   • gabapentin (NEURONTIN) 100 MG capsule gabapentin 100 mg capsule   TAKE  1 CAPSULE BY MOUTH TWICE A DAY.     • HYDROcodone-acetaminophen (NORCO) 7.5-325 MG per tablet Take 1 tablet by mouth 2 (Two)  Times a Day As Needed. 45 tablet 0   • insulin aspart (novoLOG FLEXPEN) 100 UNIT/ML solution pen-injector sc pen Inject up to 150 units subcutaneously twice daily according to sliding scale instructions from provider. 90 mL 1   • Insulin Pen Needle 31G X 5 MM misc Use three times a day. 100 each 11   • Insulin Regular Human, Conc, (HumuLIN R U-500 KwikPen) 500 UNIT/ML solution pen-injector CONCENTRATED injection Inject 120 Units under the skin into the appropriate area as directed 2 (Two) Times a Day with breakfast and dinner. 18 mL 7   • irbesartan (AVAPRO) 300 MG tablet Daily.     • lactulose (CHRONULAC) 10 GM/15ML solution solution (encephalopathy) Take 30 ml by mouth once daily for 1 month 900 mL 0   • losartan (COZAAR) 100 MG tablet losartan 100 mg tablet     • lubiprostone (Amitiza) 24 MCG capsule Take 1 capsule by mouth 2 (Two) Times a Day--Replaces Trulance 60 capsule 0   • methylcellulose (Citrucel) oral powder Take 5 g twice a day by oral route for 90 days. 900 g 10   • methylcellulose, Laxative, (CITRUCEL) 500 MG tablet tablet 1 tablet Daily.     • metOLazone (ZAROXOLYN) 2.5 MG tablet metolazone 2.5 mg tablet     • midodrine (PROAMATINE) 10 MG tablet midodrine 10 mg tablet     • nebivolol (BYSTOLIC) 5 MG tablet Bystolic 5 mg tablet   TAKE 1 TABLET BY MOUTH EVERY DAY     • ondansetron (Zofran) 4 MG tablet Take 1 tablet by mouth 3 (Three) Times a Day. 15 tablet 1   • pantoprazole (Protonix) 40 MG EC tablet Take 1 tablet by mouth 2 (Two) Times a Day. 180 tablet 4   • Plecanatide (Trulance) 3 MG tablet Take 1 tablet by mouth Daily. 30 tablet 0   • polyethylene glycol (GaviLyte-G) 236 g solution GaviLyte-G 236 gram-22.74 gram-6.74 gram-5.86 gram oral solution     • polyethylene glycol (MIRALAX) 17 GM/SCOOP powder Take 17 g by mouth 2 (Two) Times a Day. 3060 g 4   • polyethylene glycol-electrolytes (NULYTELY) 420 g solution See Admin Instructions.     • prazosin (MINIPRESS) 1 MG capsule Take 1 capsule by mouth  "Daily at bedtime. 30 capsule 2   • prazosin (MINIPRESS) 2 MG capsule Take 1 capsule by mouth every night at bedtime. 30 capsule 4   • pregabalin (LYRICA) 50 MG capsule Take 1 capsule by mouth 3 (Three) Times a Day. 90 capsule 2   • rosuvastatin (CRESTOR) 40 MG tablet Take 1 tablet by mouth Every Night. 90 tablet 3   • Sodium Phosphates (fleet enema) 7-19 GM/118ML enema Insert 133 mL by rectal route tonight.  If no BM, repeat in the AM. 133 mL 2   • Sodium Phosphates (Fleets) enema enema 133 mL.     • sucralfate (CARAFATE) 1 GM/10ML suspension Take 10 mL by mouth 4 (Four) Times a Day Before Meals & at Bedtime for 30 days. 1200 mL 0   • tamsulosin (Flomax) 0.4 MG capsule 24 hr capsule Take 1 capsule by mouth Daily. 90 capsule 0   • timolol (TIMOPTIC) 0.5 % ophthalmic solution Administer 1 drop to both eyes 2 (Two) Times a Day. 15 mL 3   • traZODone (DESYREL) 100 MG tablet Take 1 tablet by mouth once daily at bedtime 90 tablet 4   • Zinc Sulfate 220 (50 Zn) MG tablet Take 1 tablet by mouth Daily. 30 each 0     Allergies   Allergen Reactions   • Cefepime Hives and Anaphylaxis   • Bactrim [Sulfamethoxazole-Trimethoprim] Other (See Comments)     \"RENAL FAILURE\"   • Vancomycin Itching   • Zolpidem Unknown - High Severity     \"makes him crazy\"   • Zolpidem Tartrate Unknown - Low Severity and Provider Review Needed   • Metronidazole Rash     Social History     Socioeconomic History   • Marital status:    Tobacco Use   • Smoking status: Former     Types: Cigarettes     Quit date:      Years since quittin.1   • Smokeless tobacco: Never   • Tobacco comments:     [facility] [employer]   Vaping Use   • Vaping Use: Never used   Substance and Sexual Activity   • Alcohol use: No   • Drug use: No   • Sexual activity: Defer     Family History   Problem Relation Age of Onset   • Colon cancer Father    • Heart disease Father    • Colon cancer Sister    • Colon polyps Sister    • Alzheimer's disease Mother    • Coronary " artery disease Sister    • Coronary artery disease Sister      Health Maintenance   Topic Date Due   • Pneumococcal Vaccine 65+ (1 - PCV) [birth year]/1962   • TDAP/TD VACCINES (1 - Tdap) Never done   • ZOSTER VACCINE (1 of 2) Never done   • DIABETIC FOOT EXAM  Never done   • DIABETIC EYE EXAM  03/29/2017   • COVID-19 Vaccine (2 - Moderna series) 10/12/2021   • ANNUAL WELLNESS VISIT  11/03/2021   • INFLUENZA VACCINE  08/01/2022   • HEMOGLOBIN A1C  04/18/2023   • LIPID PANEL  10/18/2023   • URINE MICROALBUMIN  10/18/2023   • COLORECTAL CANCER SCREENING  02/11/2029   • HEPATITIS C SCREENING  Completed     Review of Systems   Constitutional: Negative for activity change, appetite change, chills, diaphoresis, fatigue, fever and unexpected weight change.   HENT: Negative for ear pain, hearing loss, mouth sores, sore throat, trouble swallowing and voice change.    Eyes: Negative.    Respiratory: Negative for cough, choking, shortness of breath and wheezing.    Cardiovascular: Negative for chest pain and palpitations.   Gastrointestinal: Positive for constipation. Negative for abdominal pain, blood in stool, diarrhea, nausea and vomiting.   Endocrine: Negative for cold intolerance and heat intolerance.   Genitourinary: Negative for decreased urine volume, dysuria, frequency, hematuria and urgency.   Musculoskeletal: Negative for back pain, gait problem and myalgias.   Skin: Negative for color change, pallor and rash.   Allergic/Immunologic: Negative for food allergies and immunocompromised state.   Neurological: Negative for dizziness, tremors, seizures, syncope, weakness, light-headedness, numbness and headaches.   Hematological: Negative for adenopathy. Does not bruise/bleed easily.   Psychiatric/Behavioral: Negative for agitation and confusion. The patient is not nervous/anxious.    All other systems reviewed and are negative.    Objective   Vitals:    02/22/23 0842   BP: 122/70   Pulse: 93   Temp: 97.3 °F (36.3 °C)   TempSrc:  "Temporal   SpO2: 99%   Weight: (!) 147 kg (324 lb)   Height: 182.9 cm (72\")     Body mass index is 43.94 kg/m².  Physical Exam  Constitutional:       Appearance: He is well-developed.   HENT:      Head: Normocephalic and atraumatic.   Eyes:      Pupils: Pupils are equal, round, and reactive to light.   Neck:      Trachea: No tracheal deviation.   Cardiovascular:      Rate and Rhythm: Normal rate and regular rhythm.      Heart sounds: Normal heart sounds. No murmur heard.    No friction rub. No gallop.   Pulmonary:      Effort: Pulmonary effort is normal. No respiratory distress.      Breath sounds: Normal breath sounds. No wheezing or rales.   Chest:      Chest wall: No tenderness.   Abdominal:      General: Bowel sounds are normal. There is no distension.      Palpations: Abdomen is soft. Abdomen is not rigid.      Tenderness: There is no abdominal tenderness. There is no guarding or rebound.   Musculoskeletal:         General: No tenderness or deformity. Normal range of motion.      Cervical back: Normal range of motion and neck supple.   Skin:     General: Skin is warm and dry.      Coloration: Skin is not pale.      Findings: No rash.   Neurological:      Mental Status: He is alert and oriented to person, place, and time.      Deep Tendon Reflexes: Reflexes are normal and symmetric.   Psychiatric:         Behavior: Behavior normal.         Thought Content: Thought content normal.         Judgment: Judgment normal.       Assessment & Plan   Diagnoses and all orders for this visit:    1. Slow transit constipation (Primary)    2. Gastroesophageal reflux disease with esophagitis without hemorrhage  -     FL Upper GI With Air Contrast; Future    3. Nonsmoker    4. Acute on chronic diastolic CHF (congestive heart failure) ([facility])    5. Sleep apnea with use of continuous positive airway pressure (CPAP)    6. Type 2 diabetes mellitus with hyperglycemia, with long-term current use of insulin ([facility])    take Miralax up to " twice per day  He is now having good results  We discussed the importance of treating and managing his diabetes to help as well  I am concerned about sedation at this time due to the risk of anesthesia with blood pressure concerns and other medical issues expressed by his wife today. I suggested he work with his pcp regarding these issues and follow up with me to discuss.   I discussed non pharmaceutical treatment of gerd.  This includes gradually losing weight to achieve ideal body wt., elevation of the head of bed by 4-6 inches, nothing to eat or drink 3 hours prior to lying down, avoiding tight clothing, stress reduction, tobacco cessation, reduction of alcohol intake, and dietary restrictions (avoiding caffeine, coffee, fatty foods, mints, chocolate, spicy foods and tomato based sauces as much as possible).    He had a black stool 1 month ago none since that time. H/H is normal at 13.2/41.7 continue Protonix BID. To go to the ER or call with any recurrence he agrees. Will get an upper GI to further evaluate given he is high risk for sedation and procedures at this time  He agrees.         Part of this note may be an electronic transcription/translation of spoken language to printed text using the Dragon Dictation System.  Body mass index is 43.94 kg/m².  Return in about 8 weeks (around 4/19/2023).    Class 3 Severe Obesity (BMI >=40). Obesity-related health conditions include the following: hypertension and diabetes mellitus. Obesity is unchanged. BMI is is above average; BMI management plan is completed. We discussed portion control and increasing exercise.      All risks, benefits, alternatives, and indications of colonoscopy and/or Endoscopy procedure have been discussed with the patient. Risks to include perforation of the colon requiring possible surgery or colostomy, risk of bleeding from biopsies or removal of colon tissue, possibility of missing a colon polyp or cancer, or adverse drug reaction.   Benefits to include the diagnosis and management of disease of the colon and rectum. Alternatives to include barium enema, radiographic evaluation, lab testing or no intervention. Pt verbalizes understanding and agrees.     Yuliana Herrera Karel, APRN  2/22/2023  09:28 CST          If you smoke or use tobacco, 4 minutes reading provided  Steps to Quit Smoking  Smoking tobacco can be harmful to your health and can affect almost every organ in your body. Smoking puts you, and those around you, at risk for developing many serious chronic diseases. Quitting smoking is difficult, but it is one of the best things that you can do for your health. It is never too late to quit.  What are the benefits of quitting smoking?  When you quit smoking, you lower your risk of developing serious diseases and conditions, such as:  · Lung cancer or lung disease, such as COPD.  · Heart disease.  · Stroke.  · Heart attack.  · Infertility.  · Osteoporosis and bone fractures.  Additionally, symptoms such as coughing, wheezing, and shortness of breath may get better when you quit. You may also find that you get sick less often because your body is stronger at fighting off colds and infections. If you are pregnant, quitting smoking can help to reduce your chances of having a baby of low birth weight.  How do I get ready to quit?  When you decide to quit smoking, create a plan to make sure that you are successful. Before you quit:  · Pick a date to quit. Set a date within the next two weeks to give you time to prepare.  · Write down the reasons why you are quitting. Keep this list in places where you will see it often, such as on your bathroom mirror or in your car or wallet.  · Identify the people, places, things, and activities that make you want to smoke (triggers) and avoid them. Make sure to take these actions:  ¨ Throw away all cigarettes at home, at work, and in your car.  ¨ Throw away smoking accessories, such as ashtrays and  lighters.  ¨ Clean your car and make sure to empty the ashtray.  ¨ Clean your home, including curtains and carpets.  · Tell your family, friends, and coworkers that you are quitting. Support from your loved ones can make quitting easier.  · Talk with your health care provider about your options for quitting smoking.  · Find out what treatment options are covered by your health insurance.  What strategies can I use to quit smoking?  Talk with your healthcare provider about different strategies to quit smoking. Some strategies include:  · Quitting smoking altogether instead of gradually lessening how much you smoke over a period of time. Research shows that quitting “cold turkey” is more successful than gradually quitting.  · Attending in-person counseling to help you build problem-solving skills. You are more likely to have success in quitting if you attend several counseling sessions. Even short sessions of 10 minutes can be effective.  · Finding resources and support systems that can help you to quit smoking and remain smoke-free after you quit. These resources are most helpful when you use them often. They can include:  ¨ Online chats with a counselor.  ¨ Telephone quitlines.  ¨ Printed self-help materials.  ¨ Support groups or group counseling.  ¨ Text messaging programs.  ¨ Mobile phone applications.  · Taking medicines to help you quit smoking. (If you are pregnant or breastfeeding, talk with your health care provider first.) Some medicines contain nicotine and some do not. Both types of medicines help with cravings, but the medicines that include nicotine help to relieve withdrawal symptoms. Your health care provider may recommend:  ¨ Nicotine patches, gum, or lozenges.  ¨ Nicotine inhalers or sprays.  ¨ Non-nicotine medicine that is taken by mouth.  Talk with your health care provider about combining strategies, such as taking medicines while you are also receiving in-person counseling. Using these two  strategies together makes you more likely to succeed in quitting than if you used either strategy on its own.  If you are pregnant or breastfeeding, talk with your health care provider about finding counseling or other support strategies to quit smoking. Do not take medicine to help you quit smoking unless told to do so by your health care provider.  What things can I do to make it easier to quit?  Quitting smoking might feel overwhelming at first, but there is a lot that you can do to make it easier. Take these important actions:  · Reach out to your family and friends and ask that they support and encourage you during this time. Call telephone quitlines, reach out to support groups, or work with a counselor for support.  · Ask people who smoke to avoid smoking around you.  · Avoid places that trigger you to smoke, such as bars, parties, or smoke-break areas at work.  · Spend time around people who do not smoke.  · Lessen stress in your life, because stress can be a smoking trigger for some people. To lessen stress, try:  ¨ Exercising regularly.  ¨ Deep-breathing exercises.  ¨ Yoga.  ¨ Meditating.  ¨ Performing a body scan. This involves closing your eyes, scanning your body from head to toe, and noticing which parts of your body are particularly tense. Purposefully relax the muscles in those areas.  · Download or purchase mobile phone or tablet apps (applications) that can help you stick to your quit plan by providing reminders, tips, and encouragement. There are many free apps, such as QuitGuide from the CDC (Centers for Disease Control and Prevention). You can find other support for quitting smoking (smoking cessation) through smokefree.gov and other websites.  How will I feel when I quit smoking?  Within the first 24 hours of quitting smoking, you may start to feel some withdrawal symptoms. These symptoms are usually most noticeable 2-3 days after quitting, but they usually do not last beyond 2-3 weeks. Changes  or symptoms that you might experience include:  · Mood swings.  · Restlessness, anxiety, or irritation.  · Difficulty concentrating.  · Dizziness.  · Strong cravings for sugary foods in addition to nicotine.  · Mild weight gain.  · Constipation.  · Nausea.  · Coughing or a sore throat.  · Changes in how your medicines work in your body.  · A depressed mood.  · Difficulty sleeping (insomnia).  After the first 2-3 weeks of quitting, you may start to notice more positive results, such as:  · Improved sense of smell and taste.  · Decreased coughing and sore throat.  · Slower heart rate.  · Lower blood pressure.  · Clearer skin.  · The ability to breathe more easily.  · Fewer sick days.  Quitting smoking is very challenging for most people. Do not get discouraged if you are not successful the first time. Some people need to make many attempts to quit before they achieve long-term success. Do your best to stick to your quit plan, and talk with your health care provider if you have any questions or concerns.  This information is not intended to replace advice given to you by your health care provider. Make sure you discuss any questions you have with your health care provider.  Document Released: 12/12/2002 Document Revised: 08/15/2017 Document Reviewed: 05/03/2016  Elsevier Interactive Patient Education © 2017 [employer]

## 2023-02-24 ENCOUNTER — APPOINTMENT (OUTPATIENT)
Dept: GENERAL RADIOLOGY | Facility: HOSPITAL | Age: 68
DRG: 683 | End: 2023-02-24
Payer: COMMERCIAL

## 2023-02-24 ENCOUNTER — APPOINTMENT (OUTPATIENT)
Dept: CT IMAGING | Facility: HOSPITAL | Age: 68
DRG: 683 | End: 2023-02-24
Payer: COMMERCIAL

## 2023-02-24 ENCOUNTER — HOSPITAL ENCOUNTER (INPATIENT)
Facility: HOSPITAL | Age: 68
LOS: 2 days | Discharge: HOME | DRG: 683 | End: 2023-02-27
Attending: STUDENT IN AN ORGANIZED HEALTH CARE EDUCATION/TRAINING PROGRAM | Admitting: FAMILY MEDICINE
Payer: COMMERCIAL

## 2023-02-24 DIAGNOSIS — R77.8 ELEVATED TROPONIN: ICD-10-CM

## 2023-02-24 DIAGNOSIS — N17.9 AKI (ACUTE KIDNEY INJURY): Primary | ICD-10-CM

## 2023-02-24 LAB
ALBUMIN SERPL-MCNC: 4.1 G/DL (ref 3.5–5.2)
ALBUMIN/GLOB SERPL: 1.6 G/DL
ALP SERPL-CCNC: 79 U/L (ref 39–117)
ALT SERPL W P-5'-P-CCNC: 11 U/L (ref 1–41)
ANION GAP SERPL CALCULATED.3IONS-SCNC: 13 MMOL/L (ref 5–15)
APTT PPP: 27.6 SECONDS (ref 24.1–35)
AST SERPL-CCNC: 17 U/L (ref 1–40)
BACTERIA UR QL AUTO: ABNORMAL /HPF
BASOPHILS # BLD AUTO: 0.04 10*3/MM3 (ref 0–0.2)
BASOPHILS NFR BLD AUTO: 0.5 % (ref 0–1.5)
BILIRUB SERPL-MCNC: 0.4 MG/DL (ref 0–1.2)
BILIRUB UR QL STRIP: NEGATIVE
BUN SERPL-MCNC: 55 MG/DL (ref 8–23)
BUN/CREAT SERPL: 17.1 (ref 7–25)
CALCIUM SPEC-SCNC: 8.8 MG/DL (ref 8.6–10.5)
CHLORIDE SERPL-SCNC: 96 MMOL/L (ref 98–107)
CLARITY UR: CLEAR
CO2 SERPL-SCNC: 27 MMOL/L (ref 22–29)
COLOR UR: YELLOW
CREAT SERPL-MCNC: 3.22 MG/DL (ref 0.76–1.27)
D-LACTATE SERPL-SCNC: 1.6 MMOL/L (ref 0.5–2)
DEPRECATED RDW RBC AUTO: 43.6 FL (ref 37–54)
EGFRCR SERPLBLD CKD-EPI 2021: 20.4 ML/MIN/1.73
EOSINOPHIL # BLD AUTO: 0.44 10*3/MM3 (ref 0–0.4)
EOSINOPHIL NFR BLD AUTO: 5.2 % (ref 0.3–6.2)
ERYTHROCYTE [DISTWIDTH] IN BLOOD BY AUTOMATED COUNT: 13.7 % (ref 12.3–15.4)
GLOBULIN UR ELPH-MCNC: 2.5 GM/DL
GLUCOSE SERPL-MCNC: 392 MG/DL (ref 65–99)
GLUCOSE UR STRIP-MCNC: ABNORMAL MG/DL
HCT VFR BLD AUTO: 36.1 % (ref 37.5–51)
HGB BLD-MCNC: 11.7 G/DL (ref 13–17.7)
HGB UR QL STRIP.AUTO: NEGATIVE
HYALINE CASTS UR QL AUTO: ABNORMAL /LPF
IMM GRANULOCYTES # BLD AUTO: 0.03 10*3/MM3 (ref 0–0.05)
IMM GRANULOCYTES NFR BLD AUTO: 0.4 % (ref 0–0.5)
INR PPP: 1.03 (ref 0.91–1.09)
KETONES UR QL STRIP: NEGATIVE
LEUKOCYTE ESTERASE UR QL STRIP.AUTO: NEGATIVE
LIPASE SERPL-CCNC: 77 U/L (ref 13–60)
LYMPHOCYTES # BLD AUTO: 1.83 10*3/MM3 (ref 0.7–3.1)
LYMPHOCYTES NFR BLD AUTO: 21.4 % (ref 19.6–45.3)
MCH RBC QN AUTO: 28.3 PG (ref 26.6–33)
MCHC RBC AUTO-ENTMCNC: 32.4 G/DL (ref 31.5–35.7)
MCV RBC AUTO: 87.2 FL (ref 79–97)
MONOCYTES # BLD AUTO: 0.79 10*3/MM3 (ref 0.1–0.9)
MONOCYTES NFR BLD AUTO: 9.3 % (ref 5–12)
NEUTROPHILS NFR BLD AUTO: 5.41 10*3/MM3 (ref 1.7–7)
NEUTROPHILS NFR BLD AUTO: 63.2 % (ref 42.7–76)
NITRITE UR QL STRIP: NEGATIVE
NRBC BLD AUTO-RTO: 0 /100 WBC (ref 0–0.2)
NT-PROBNP SERPL-MCNC: 592.3 PG/ML (ref 0–900)
PH UR STRIP.AUTO: 6 [PH] (ref 5–8)
PLATELET # BLD AUTO: 200 10*3/MM3 (ref 140–450)
PMV BLD AUTO: 11.7 FL (ref 6–12)
POTASSIUM SERPL-SCNC: 4.9 MMOL/L (ref 3.5–5.2)
PROT SERPL-MCNC: 6.6 G/DL (ref 6–8.5)
PROT UR QL STRIP: ABNORMAL
PROTHROMBIN TIME: 13.6 SECONDS (ref 11.8–14.8)
RBC # BLD AUTO: 4.14 10*6/MM3 (ref 4.14–5.8)
RBC # UR STRIP: ABNORMAL /HPF
REF LAB TEST METHOD: ABNORMAL
SODIUM SERPL-SCNC: 136 MMOL/L (ref 136–145)
SP GR UR STRIP: 1.02 (ref 1–1.03)
SQUAMOUS #/AREA URNS HPF: ABNORMAL /HPF
TROPONIN T SERPL HS-MCNC: 52 NG/L
UROBILINOGEN UR QL STRIP: ABNORMAL
WBC # UR STRIP: ABNORMAL /HPF
WBC NRBC COR # BLD: 8.54 10*3/MM3 (ref 3.4–10.8)

## 2023-02-24 PROCEDURE — 85025 COMPLETE CBC W/AUTO DIFF WBC: CPT | Performed by: STUDENT IN AN ORGANIZED HEALTH CARE EDUCATION/TRAINING PROGRAM

## 2023-02-24 PROCEDURE — 71045 X-RAY EXAM CHEST 1 VIEW: CPT

## 2023-02-24 PROCEDURE — 83605 ASSAY OF LACTIC ACID: CPT | Performed by: STUDENT IN AN ORGANIZED HEALTH CARE EDUCATION/TRAINING PROGRAM

## 2023-02-24 PROCEDURE — 84484 ASSAY OF TROPONIN QUANT: CPT | Performed by: STUDENT IN AN ORGANIZED HEALTH CARE EDUCATION/TRAINING PROGRAM

## 2023-02-24 PROCEDURE — 83880 ASSAY OF NATRIURETIC PEPTIDE: CPT | Performed by: STUDENT IN AN ORGANIZED HEALTH CARE EDUCATION/TRAINING PROGRAM

## 2023-02-24 PROCEDURE — 99285 EMERGENCY DEPT VISIT HI MDM: CPT

## 2023-02-24 PROCEDURE — 85610 PROTHROMBIN TIME: CPT | Performed by: STUDENT IN AN ORGANIZED HEALTH CARE EDUCATION/TRAINING PROGRAM

## 2023-02-24 PROCEDURE — 83690 ASSAY OF LIPASE: CPT | Performed by: STUDENT IN AN ORGANIZED HEALTH CARE EDUCATION/TRAINING PROGRAM

## 2023-02-24 PROCEDURE — 84466 ASSAY OF TRANSFERRIN: CPT | Performed by: INTERNAL MEDICINE

## 2023-02-24 PROCEDURE — 83540 ASSAY OF IRON: CPT | Performed by: INTERNAL MEDICINE

## 2023-02-24 PROCEDURE — 93005 ELECTROCARDIOGRAM TRACING: CPT | Performed by: STUDENT IN AN ORGANIZED HEALTH CARE EDUCATION/TRAINING PROGRAM

## 2023-02-24 PROCEDURE — 80053 COMPREHEN METABOLIC PANEL: CPT | Performed by: STUDENT IN AN ORGANIZED HEALTH CARE EDUCATION/TRAINING PROGRAM

## 2023-02-24 PROCEDURE — 36415 COLL VENOUS BLD VENIPUNCTURE: CPT

## 2023-02-24 PROCEDURE — 85730 THROMBOPLASTIN TIME PARTIAL: CPT | Performed by: STUDENT IN AN ORGANIZED HEALTH CARE EDUCATION/TRAINING PROGRAM

## 2023-02-24 PROCEDURE — 93010 ELECTROCARDIOGRAM REPORT: CPT | Performed by: INTERNAL MEDICINE

## 2023-02-24 PROCEDURE — 74176 CT ABD & PELVIS W/O CONTRAST: CPT

## 2023-02-24 PROCEDURE — 81001 URINALYSIS AUTO W/SCOPE: CPT | Performed by: STUDENT IN AN ORGANIZED HEALTH CARE EDUCATION/TRAINING PROGRAM

## 2023-02-24 RX ORDER — ASPIRIN 81 MG/1
324 TABLET, CHEWABLE ORAL ONCE
Status: COMPLETED | OUTPATIENT
Start: 2023-02-24 | End: 2023-02-24

## 2023-02-24 RX ADMIN — ASPIRIN 324 MG: 81 TABLET, CHEWABLE ORAL at 21:34

## 2023-02-24 RX ADMIN — SODIUM CHLORIDE, POTASSIUM CHLORIDE, SODIUM LACTATE AND CALCIUM CHLORIDE 500 ML: 600; 310; 30; 20 INJECTION, SOLUTION INTRAVENOUS at 21:31

## 2023-02-24 NOTE — Clinical Note
Level of Care: Med/Surg [1]   Diagnosis: WANDER (acute kidney injury) ([facility]) [[id number]]   Admitting Physician: JENNA HALE [[id number]]   Attending Physician: JENNA HALE [[id number]]   Certification: I Certify That Inpatient Hospital Services Are Medically Necessary For Greater Than 2 Midnights

## 2023-02-25 PROBLEM — N17.9 AKI (ACUTE KIDNEY INJURY): Status: ACTIVE | Noted: 2023-02-25

## 2023-02-25 LAB
GLUCOSE BLDC GLUCOMTR-MCNC: 196 MG/DL (ref 70–130)
GLUCOSE BLDC GLUCOMTR-MCNC: 203 MG/DL (ref 70–130)
GLUCOSE BLDC GLUCOMTR-MCNC: 252 MG/DL (ref 70–130)
GLUCOSE BLDC GLUCOMTR-MCNC: 255 MG/DL (ref 70–130)
GLUCOSE BLDC GLUCOMTR-MCNC: 325 MG/DL (ref 70–130)
IRON 24H UR-MRATE: 57 MCG/DL (ref 59–158)
IRON SATN MFR SERPL: 17 % (ref 20–50)
PROT ?TM UR-MCNC: 93.6 MG/DL
PTH-INTACT SERPL-MCNC: 172.3 PG/ML (ref 15–65)
QT INTERVAL: 404 MS
QT INTERVAL: 404 MS
QTC INTERVAL: 432 MS
QTC INTERVAL: 442 MS
SODIUM UR-SCNC: 40 MMOL/L
TIBC SERPL-MCNC: 340 MCG/DL (ref 298–536)
TRANSFERRIN SERPL-MCNC: 228 MG/DL (ref 200–360)
TROPONIN T SERPL HS-MCNC: 44 NG/L

## 2023-02-25 PROCEDURE — 82043 UR ALBUMIN QUANTITATIVE: CPT | Performed by: INTERNAL MEDICINE

## 2023-02-25 PROCEDURE — 82570 ASSAY OF URINE CREATININE: CPT | Performed by: INTERNAL MEDICINE

## 2023-02-25 PROCEDURE — 93010 ELECTROCARDIOGRAM REPORT: CPT | Performed by: INTERNAL MEDICINE

## 2023-02-25 PROCEDURE — 84300 ASSAY OF URINE SODIUM: CPT | Performed by: INTERNAL MEDICINE

## 2023-02-25 PROCEDURE — 83970 ASSAY OF PARATHORMONE: CPT | Performed by: INTERNAL MEDICINE

## 2023-02-25 PROCEDURE — 63710000001 INSULIN LISPRO (HUMAN) PER 5 UNITS: Performed by: INTERNAL MEDICINE

## 2023-02-25 PROCEDURE — 82962 GLUCOSE BLOOD TEST: CPT

## 2023-02-25 PROCEDURE — 25010000002 ONDANSETRON PER 1 MG: Performed by: INTERNAL MEDICINE

## 2023-02-25 PROCEDURE — 84156 ASSAY OF PROTEIN URINE: CPT | Performed by: INTERNAL MEDICINE

## 2023-02-25 RX ORDER — DEXTROSE MONOHYDRATE 25 G/50ML
25 INJECTION, SOLUTION INTRAVENOUS
Status: DISCONTINUED | OUTPATIENT
Start: 2023-02-25 | End: 2023-02-27 | Stop reason: HOSPADM

## 2023-02-25 RX ORDER — HYDROCODONE BITARTRATE AND ACETAMINOPHEN 7.5; 325 MG/1; MG/1
1 TABLET ORAL 2 TIMES DAILY PRN
Status: DISCONTINUED | OUTPATIENT
Start: 2023-02-25 | End: 2023-02-26

## 2023-02-25 RX ORDER — TERAZOSIN 5 MG/1
5 CAPSULE ORAL NIGHTLY
Status: DISCONTINUED | OUTPATIENT
Start: 2023-02-26 | End: 2023-02-27 | Stop reason: HOSPADM

## 2023-02-25 RX ORDER — CYCLOBENZAPRINE HCL 5 MG
5 TABLET ORAL 2 TIMES DAILY PRN
Status: DISCONTINUED | OUTPATIENT
Start: 2023-02-25 | End: 2023-02-27 | Stop reason: HOSPADM

## 2023-02-25 RX ORDER — SODIUM CHLORIDE 9 MG/ML
100 INJECTION, SOLUTION INTRAVENOUS CONTINUOUS
Status: DISCONTINUED | OUTPATIENT
Start: 2023-02-25 | End: 2023-02-27

## 2023-02-25 RX ORDER — ALBUTEROL SULFATE 2.5 MG/3ML
2.5 SOLUTION RESPIRATORY (INHALATION) EVERY 4 HOURS PRN
Status: DISCONTINUED | OUTPATIENT
Start: 2023-02-25 | End: 2023-02-27 | Stop reason: HOSPADM

## 2023-02-25 RX ORDER — TRAZODONE HYDROCHLORIDE 100 MG/1
100 TABLET ORAL NIGHTLY PRN
Status: DISCONTINUED | OUTPATIENT
Start: 2023-02-25 | End: 2023-02-27 | Stop reason: HOSPADM

## 2023-02-25 RX ORDER — CARVEDILOL 6.25 MG/1
6.25 TABLET ORAL 2 TIMES DAILY WITH MEALS
Status: DISCONTINUED | OUTPATIENT
Start: 2023-02-25 | End: 2023-02-27 | Stop reason: HOSPADM

## 2023-02-25 RX ORDER — AMITRIPTYLINE HYDROCHLORIDE 10 MG/1
50 TABLET, FILM COATED ORAL NIGHTLY
Status: DISCONTINUED | OUTPATIENT
Start: 2023-02-26 | End: 2023-02-27 | Stop reason: HOSPADM

## 2023-02-25 RX ORDER — ACETAMINOPHEN 325 MG/1
650 TABLET ORAL EVERY 4 HOURS PRN
Status: DISCONTINUED | OUTPATIENT
Start: 2023-02-25 | End: 2023-02-27 | Stop reason: HOSPADM

## 2023-02-25 RX ORDER — TAMSULOSIN HYDROCHLORIDE 0.4 MG/1
0.4 CAPSULE ORAL DAILY
Status: DISCONTINUED | OUTPATIENT
Start: 2023-02-26 | End: 2023-02-27 | Stop reason: HOSPADM

## 2023-02-25 RX ORDER — INSULIN LISPRO 100 [IU]/ML
2-7 INJECTION, SOLUTION INTRAVENOUS; SUBCUTANEOUS
Status: DISCONTINUED | OUTPATIENT
Start: 2023-02-25 | End: 2023-02-27 | Stop reason: HOSPADM

## 2023-02-25 RX ORDER — ONDANSETRON 2 MG/ML
4 INJECTION INTRAMUSCULAR; INTRAVENOUS EVERY 6 HOURS PRN
Status: DISCONTINUED | OUTPATIENT
Start: 2023-02-25 | End: 2023-02-27 | Stop reason: HOSPADM

## 2023-02-25 RX ORDER — MIDODRINE HYDROCHLORIDE 2.5 MG/1
2.5 TABLET ORAL
Status: DISCONTINUED | OUTPATIENT
Start: 2023-02-25 | End: 2023-02-27 | Stop reason: HOSPADM

## 2023-02-25 RX ORDER — CETIRIZINE HYDROCHLORIDE 10 MG/1
10 TABLET ORAL DAILY
Status: DISCONTINUED | OUTPATIENT
Start: 2023-02-26 | End: 2023-02-27 | Stop reason: HOSPADM

## 2023-02-25 RX ORDER — NICOTINE POLACRILEX 4 MG
15 LOZENGE BUCCAL
Status: DISCONTINUED | OUTPATIENT
Start: 2023-02-25 | End: 2023-02-27 | Stop reason: HOSPADM

## 2023-02-25 RX ORDER — ALBUTEROL SULFATE 90 UG/1
2 AEROSOL, METERED RESPIRATORY (INHALATION) EVERY 4 HOURS PRN
Status: DISCONTINUED | OUTPATIENT
Start: 2023-02-25 | End: 2023-02-25 | Stop reason: CLARIF

## 2023-02-25 RX ORDER — DONEPEZIL HYDROCHLORIDE 10 MG/1
10 TABLET, FILM COATED ORAL DAILY
Status: DISCONTINUED | OUTPATIENT
Start: 2023-02-25 | End: 2023-02-27 | Stop reason: HOSPADM

## 2023-02-25 RX ORDER — ASPIRIN 81 MG/1
81 TABLET, CHEWABLE ORAL DAILY
Status: DISCONTINUED | OUTPATIENT
Start: 2023-02-25 | End: 2023-02-27 | Stop reason: HOSPADM

## 2023-02-25 RX ORDER — PREGABALIN 50 MG/1
50 CAPSULE ORAL 3 TIMES DAILY
Status: DISCONTINUED | OUTPATIENT
Start: 2023-02-26 | End: 2023-02-27 | Stop reason: HOSPADM

## 2023-02-25 RX ORDER — METOCLOPRAMIDE HYDROCHLORIDE 5 MG/ML
10 INJECTION INTRAMUSCULAR; INTRAVENOUS EVERY 6 HOURS
Status: DISCONTINUED | OUTPATIENT
Start: 2023-02-26 | End: 2023-02-26

## 2023-02-25 RX ADMIN — MIDODRINE HYDROCHLORIDE 2.5 MG: 2.5 TABLET ORAL at 18:51

## 2023-02-25 RX ADMIN — CARVEDILOL 6.25 MG: 6.25 TABLET, FILM COATED ORAL at 18:51

## 2023-02-25 RX ADMIN — DONEPEZIL HYDROCHLORIDE 10 MG: 10 TABLET ORAL at 08:35

## 2023-02-25 RX ADMIN — HYDROCODONE BITARTRATE AND ACETAMINOPHEN 1 TABLET: 7.5; 325 TABLET ORAL at 04:41

## 2023-02-25 RX ADMIN — CARVEDILOL 6.25 MG: 6.25 TABLET, FILM COATED ORAL at 08:35

## 2023-02-25 RX ADMIN — ASPIRIN 81 MG: 81 TABLET, CHEWABLE ORAL at 08:35

## 2023-02-25 RX ADMIN — HYDROCODONE BITARTRATE AND ACETAMINOPHEN 1 TABLET: 7.5; 325 TABLET ORAL at 18:51

## 2023-02-25 RX ADMIN — SODIUM CHLORIDE 100 ML/HR: 9 INJECTION, SOLUTION INTRAVENOUS at 23:58

## 2023-02-25 RX ADMIN — ACETAMINOPHEN 650 MG: 325 TABLET, FILM COATED ORAL at 20:34

## 2023-02-25 RX ADMIN — PREGABALIN 50 MG: 50 CAPSULE ORAL at 23:58

## 2023-02-25 RX ADMIN — SODIUM CHLORIDE 100 ML/HR: 9 INJECTION, SOLUTION INTRAVENOUS at 02:43

## 2023-02-25 RX ADMIN — INSULIN LISPRO 2 UNITS: 100 INJECTION, SOLUTION INTRAVENOUS; SUBCUTANEOUS at 18:51

## 2023-02-25 RX ADMIN — INSULIN LISPRO 4 UNITS: 100 INJECTION, SOLUTION INTRAVENOUS; SUBCUTANEOUS at 08:35

## 2023-02-25 RX ADMIN — ONDANSETRON 4 MG: 2 INJECTION INTRAMUSCULAR; INTRAVENOUS at 20:34

## 2023-02-25 NOTE — PLAN OF CARE
Goal Outcome Evaluation:              Outcome Evaluation: Pt admitted through the ED for an WANDER. Pt c/o chronic neck pain, but fell asleep before norco could be administered. VSS. Pt NPO for nephrology consult. IVF infused as ordered. Sinus 71-75. SpO2  on room air.

## 2023-02-25 NOTE — CONSULTS
Nephrology ([facility]) Consult Note      Patient:  Erick Luong  YOB: 1955  Date of Service: 2/25/2023  MRN: [id number]   Acct: [id number]   Primary Care Physician: Del Shetty MD  Advance Directive:   Code Status and Medical Interventions:   Ordered at: 02/25/23 0155     Code Status (Patient has no pulse and is not breathing):    CPR (Attempt to Resuscitate)     Medical Interventions (Patient has pulse or is breathing):    Full Support     Admit Date: 2/24/2023       Hospital Day: 0  Referring Provider: Piter Perez MD      Patient Seen, Chart, Consults, Notes, Labs, Radiology studies reviewed.    Chief complaint: Abnormal labs.    Subjective:  Erick Luong is a 67 y.o. male  whom we were consulted for acute kidney injury.  He has history of stage IIIb chronic kidney disease baseline, follows me in the office.  His last estimated GFR is 40 mL.  Patient also has history of morbid abdominal obesity, chronic kidney disease, coronary artery disease, hypertension and hyperlipidemia.  Patient was directed to go to hospital by his primary care doctor.  As he was found to have fairly abnormal renal labs.  He was also reporting decreasing blood pressure and blood sugar.  Patient denies any nausea vomiting or diarrhea.  His intake of fluid has been very poor.  He denies any use of nonsteroidal agents.  Patient also denies any shortness of breath or swelling of lower extremities.  Incidental finding of the labs showed serum creatinine was 3.5 mg, baseline creatinine usually 1.8 mg.    Allergies:  Cefepime, Bactrim [sulfamethoxazole-trimethoprim], Vancomycin, Zolpidem, Zolpidem tartrate, and Metronidazole    Home Meds:  Medications Prior to Admission   Medication Sig Dispense Refill Last Dose   • amitriptyline (ELAVIL) 25 MG tablet Take 2 tablets by mouth Daily at bedtime. 60 tablet 1 Past Week   • Aspirin 81 MG capsule Take 81 mg by mouth Daily.   Past Week   • Azelastine HCl  137 MCG/SPRAY solution Use 2 sprays into the nostril(s) as directed by provider 2 (Two) Times a Day. 30 mL 3 Past Week   • bumetanide (BUMEX) 2 MG tablet Take 1 tablet by mouth 2 (Two) Times a Day for 30 days. 60 tablet 0 Past Week   • busPIRone (BUSPAR) 10 MG tablet Take 1 tablet by mouth 3 (Three) Times a Day As Needed. 270 tablet 4 Past Week   • calcitriol (ROCALTROL) 0.5 MCG capsule Take 1 capsule by mouth Daily. 90 capsule 4 Past Week   • carvedilol (COREG) 6.25 MG tablet Take 1 tablet by mouth 2 (Two) Times a Day. 180 tablet 4 Past Week   • Cholecalciferol 125 MCG (5000 UT) tablet Take 1 tablet by mouth Daily with meal 30 tablet 2 Past Week   • cloNIDine (CATAPRES) 0.1 MG tablet Take 0.1 mg by mouth Every 12 (Twelve) Hours.   Past Week   • colchicine 0.6 MG tablet Take 0.6 mg by mouth Daily.   Past Week   • donepezil (ARICEPT) 10 MG tablet Take 1 tablet by mouth Daily. 90 tablet 4 Past Week   • Dulaglutide (Trulicity) 3 MG/0.5ML solution pen-injector Inject 3 mg under the skin into the appropriate area as directed Every 7 (Seven) Days. 4 mL 11 Past Week   • DULoxetine (CYMBALTA) 60 MG capsule Take 1 capsule by mouth Daily. 90 capsule 4 Past Week   • fexofenadine (ALLEGRA) 180 MG tablet Take 1 tablet by mouth Daily. 30 tablet 2 Past Week   • gabapentin (NEURONTIN) 100 MG capsule Take 100 mg by mouth 2 (Two) Times a Day.   Past Week   • insulin aspart (novoLOG FLEXPEN) 100 UNIT/ML solution pen-injector sc pen Inject up to 150 units subcutaneously twice daily according to sliding scale instructions from provider. 90 mL 1 Past Week   • irbesartan (AVAPRO) 150 MG tablet Take 150 mg by mouth Daily.   Past Week   • albuterol sulfate  (90 Base) MCG/ACT inhaler Inhale 2 puffs Every 4 (Four) Hours As Needed for Wheezing. 8 g 1    • Continuous Blood Gluc  (Dexcom G6 ) device Take As Directed. 1 each 2    • Continuous Blood Gluc Sensor (Dexcom G6 Sensor) Use As Directed. 1 each 2    • Continuous  Blood Gluc Transmit (Dexcom G6 Transmitter) misc Use As Directed. 1 each 2    • cyclobenzaprine (FLEXERIL) 5 MG tablet Take 1 tablet by mouth 2 (Two) Times a Day As Needed for muscle spasms 60 tablet 5    • HYDROcodone-acetaminophen (NORCO) 7.5-325 MG per tablet Take 1 tablet by mouth 2 (Two) Times a Day As Needed. 45 tablet 0    • Insulin Pen Needle 31G X 5 MM misc Use three times a day. 100 each 11    • Insulin Regular Human, Conc, (HumuLIN R U-500 KwikPen) 500 UNIT/ML solution pen-injector CONCENTRATED injection Inject 120 Units under the skin into the appropriate area as directed 2 (Two) Times a Day with breakfast and dinner. 18 mL 7    • lactulose (CHRONULAC) 10 GM/15ML solution solution (encephalopathy) Take 30 ml by mouth once daily for 1 month 900 mL 0    • losartan (COZAAR) 100 MG tablet losartan 100 mg tablet      • lubiprostone (Amitiza) 24 MCG capsule Take 1 capsule by mouth 2 (Two) Times a Day--Replaces Trulance 60 capsule 0    • methylcellulose (Citrucel) oral powder Take 5 g twice a day by oral route for 90 days. 900 g 10    • methylcellulose, Laxative, (CITRUCEL) 500 MG tablet tablet 1 tablet Daily.      • metOLazone (ZAROXOLYN) 2.5 MG tablet metolazone 2.5 mg tablet      • midodrine (PROAMATINE) 10 MG tablet midodrine 10 mg tablet      • nebivolol (BYSTOLIC) 5 MG tablet Bystolic 5 mg tablet   TAKE 1 TABLET BY MOUTH EVERY DAY      • ondansetron (Zofran) 4 MG tablet Take 1 tablet by mouth 3 (Three) Times a Day. 15 tablet 1    • pantoprazole (Protonix) 40 MG EC tablet Take 1 tablet by mouth 2 (Two) Times a Day. 180 tablet 4    • Plecanatide (Trulance) 3 MG tablet Take 1 tablet by mouth Daily. 30 tablet 0    • polyethylene glycol (GaviLyte-G) 236 g solution GaviLyte-G 236 gram-22.74 gram-6.74 gram-5.86 gram oral solution      • polyethylene glycol (MIRALAX) 17 GM/SCOOP powder Take 17 g by mouth 2 (Two) Times a Day. 3060 g 4    • polyethylene glycol-electrolytes (NULYTELY) 420 g solution See Admin  Instructions.      • prazosin (MINIPRESS) 1 MG capsule Take 1 capsule by mouth Daily at bedtime. 30 capsule 2    • prazosin (MINIPRESS) 2 MG capsule Take 1 capsule by mouth every night at bedtime. 30 capsule 4    • pregabalin (LYRICA) 50 MG capsule Take 1 capsule by mouth 3 (Three) Times a Day. 90 capsule 2    • rosuvastatin (CRESTOR) 40 MG tablet Take 1 tablet by mouth Every Night. 90 tablet 3    • Sodium Phosphates (fleet enema) 7-19 GM/118ML enema Insert 133 mL by rectal route tonight.  If no BM, repeat in the AM. 133 mL 2    • Sodium Phosphates (Fleets) enema enema 133 mL.      • sucralfate (CARAFATE) 1 GM/10ML suspension Take 10 mL by mouth 4 (Four) Times a Day Before Meals & at Bedtime for 30 days. 1200 mL 0    • tamsulosin (Flomax) 0.4 MG capsule 24 hr capsule Take 1 capsule by mouth Daily. 90 capsule 0    • timolol (TIMOPTIC) 0.5 % ophthalmic solution Administer 1 drop to both eyes 2 (Two) Times a Day. 15 mL 3    • traZODone (DESYREL) 100 MG tablet Take 1 tablet by mouth once daily at bedtime 90 tablet 4    • Zinc Sulfate 220 (50 Zn) MG tablet Take 1 tablet by mouth Daily. 30 each 0        Medicines:  Current Facility-Administered Medications   Medication Dose Route Frequency Provider Last Rate Last Admin   • albuterol (PROVENTIL) nebulizer solution 0.083% 2.5 mg/3mL  2.5 mg Nebulization Q4H PRN Abdoulaye Ames MD       • aspirin chewable tablet 81 mg  81 mg Oral Daily Abdoulaye Ames MD   81 mg at 02/25/23 0835   • carvedilol (COREG) tablet 6.25 mg  6.25 mg Oral BID With Meals Abdoulaye Ames MD   6.25 mg at 02/25/23 0835   • dextrose (D50W) (25 g/50 mL) IV injection 25 g  25 g Intravenous Q15 Min PRN Abdoulaye Ames MD       • dextrose (GLUTOSE) oral gel 15 g  15 g Oral Q15 Min PRN Abdoulaye Ames MD       • donepezil (ARICEPT) tablet 10 mg  10 mg Oral Daily Abdoulaye Ames MD   10 mg at 02/25/23 4919   • glucagon (human recombinant) (GLUCAGEN DIAGNOSTIC) injection 1 mg  1 mg  Intramuscular Q15 Min PRN Abdoulaye Ames MD       • HYDROcodone-acetaminophen (NORCO) 7.5-325 MG per tablet 1 tablet  1 tablet Oral BID PRN Abdoulaye Ames MD   1 tablet at 02/25/23 0441   • Insulin Lispro (humaLOG) injection 2-7 Units  2-7 Units Subcutaneous TID AC Abdoulaye Ames MD   4 Units at 02/25/23 0835   • sodium chloride 0.9 % infusion  100 mL/hr Intravenous Continuous Abdoulaye Ames  mL/hr at 02/25/23 1330 100 mL/hr at 02/25/23 1330       Past Medical History:  Past Medical History:   Diagnosis Date   • Arthritis    • Autonomic disease    • CAD (coronary artery disease) 02/06/2017   • Cervical radiculopathy 09/16/2021   • Chronic constipation with acute exaccerbation 05/10/2021   • Coronary artery disease    • Degeneration of cervical intervertebral disc 08/11/2021   • Diabetes mellitus (HCC)    • Diabetic foot ulcer (HCC) 08/31/2020   • Diabetic polyneuropathy associated with type 2 diabetes mellitus (HCC) 01/18/2021   • Elevated cholesterol    • Gastroesophageal reflux disease 05/13/2019   • Headache    • HTN (hypertension), benign 05/03/2017   • Hyperlipidemia    • Hypertension    • Mixed hyperlipidemia 02/07/2017   • Multiple lung nodules 01/26/2020    5mm, 9 mm RLL identified 1/2020, not present 10/2019.   • Myocardial infarction (HCC)    • Osteomyelitis (HCC) 01/22/2020   • Osteomyelitis of fifth toe of right foot ([facility]) 10/07/2019   • Pancreatitis    • Persistent insomnia 01/20/2020   • Renal disorder    • Sleep apnea 02/06/2017   • Sleep apnea with use of continuous positive airway pressure (CPAP)     NON-COMPLIANT   • Slow transit constipation 01/16/2019   • Spinal stenosis in cervical region 09/16/2021   • Vitamin D deficiency 03/02/2021       Past Surgical History:  Past Surgical History:   Procedure Laterality Date   • ABDOMINAL SURGERY     • AMPUTATION FOOT / TOE Left 10/2021    5th digit    • ANTERIOR CERVICAL DISCECTOMY W/ FUSION N/A 8/5/2022    Procedure: CERVICAL  DISCECTOMY ANTERIOR WITH FUSION C5-6 with possible plating of C5-7 with neuromonitoring and 1 c-arm;  Surgeon: Karel Soliz MD;  Location:  [facility] OR;  Service: Neurosurgery;  Laterality: N/A;   • APPENDECTOMY     • BACK SURGERY     • CARDIAC CATHETERIZATION Left 2021    Procedure: Left Heart Cath w poss intervention left anatomical snuff box acess;  Surgeon: Omkar Charles DO;  Location:  [facility] CATH INVASIVE LOCATION;  Service: Cardiology;  Laterality: Left;   • CARDIAC SURGERY     • CATARACT EXTRACTION     • CERVICAL SPINE SURGERY     • COLONOSCOPY N/A 2017    Normal exam repeat in 5 years   • COLONOSCOPY N/A 2019    Mild acute inflammation   • COLONOSCOPY W/ POLYPECTOMY  2014    Hyperplastic polyp   • CORONARY ARTERY BYPASS GRAFT  10/2015   • ENDOSCOPY  2011    Gastritis with hemorrhage   • ENDOSCOPY N/A 2017    Normal exam   • ENDOSCOPY N/A 2019    Gastritis   • ENDOSCOPY N/A 2020    Non-erosive gastritis with hemorrhage   • ENDOSCOPY N/A 02/10/2021    Esophagitis   • FOOT SURGERY Left    • INCISION AND DRAINAGE OF WOUND Left 2007    spider bite       Family History  Family History   Problem Relation Age of Onset   • Colon cancer Father    • Heart disease Father    • Colon cancer Sister    • Colon polyps Sister    • Alzheimer's disease Mother    • Coronary artery disease Sister    • Coronary artery disease Sister        Social History  Social History     Socioeconomic History   • Marital status:    Tobacco Use   • Smoking status: Former     Types: Cigarettes     Quit date:      Years since quittin.1   • Smokeless tobacco: Never   • Tobacco comments:     smoked in highschool   Vaping Use   • Vaping Use: Never used   Substance and Sexual Activity   • Alcohol use: No   • Drug use: No   • Sexual activity: Defer         Review of Systems:  History obtained from chart review and the patient  General ROS: No fever or chills  Respiratory  "ROS: No cough, shortness of breath, wheezing  Cardiovascular ROS: no chest pain or dyspnea on exertion  Gastrointestinal ROS: No abdominal pain or melena  Genito-Urinary ROS: No dysuria or hematuria  14 point ROS reviewed with the patient and negative except as noted above and in the HPI unless unable to obtain.    Objective:  /53 (BP Location: Left arm, Patient Position: Sitting)   Pulse 66   Temp 97.6 °F (36.4 °C) (Oral)   Resp 16   Ht 182.9 cm (72\")   Wt (!) 147 kg (325 lb)   SpO2 95%   BMI 44.08 kg/m²     Intake/Output Summary (Last 24 hours) at 2/25/2023 1420  Last data filed at 2/25/2023 1300  Gross per 24 hour   Intake 0 ml   Output --   Net 0 ml     General: awake/alert    HEENT: Normocephalic atraumatic head  Neck: Supple with no JVD accorded bruits  Chest:  clear to auscultation bilaterally without respiratory distress  CVS: regular rate and rhythm  Abdominal: soft, nontender, normal bowel sounds  Extremities: no cyanosis or edema  Skin: warm and dry without rash      Labs:    Results from last 7 days   Lab Units 02/24/23  2017 02/22/23  1007   WBC 10*3/mm3 8.54 7.83   HEMOGLOBIN g/dL 11.7* 12.1*   HEMATOCRIT % 36.1* 35.8*   PLATELETS 10*3/mm3 200 214       Results from last 7 days   Lab Units 02/24/23  2017 02/22/23  1007   SODIUM mmol/L 136 140   POTASSIUM mmol/L 4.9 4.2   CHLORIDE mmol/L 96* 101   CO2 mmol/L 27.0 25.6   BUN mg/dL 55* 68*   CREATININE mg/dL 3.22* 3.58*   CALCIUM mg/dL 8.8 8.9   BILIRUBIN mg/dL 0.4 0.4   ALK PHOS U/L 79 72   ALT (SGPT) U/L 11 11   AST (SGOT) U/L 17 13   GLUCOSE mg/dL 392* 69   EGFR mL/min/1.73 20.4* 18.0*         Radiology:   Imaging Results (Last 24 Hours)     Procedure Component Value Units Date/Time    CT Abdomen Pelvis Without Contrast [[id number]] Collected: 02/25/23 0204     Updated: 02/25/23 0221    Narrative:      EXAMINATION: CT ABDOMEN PELVIS WO CONTRAST- 2/25/2023 2:04 AM CST     HISTORY: omar; N17.9-Acute kidney failure, unspecified; " R77.8-Other  specified abnormalities of plasma proteins     DOSE: 1475 mGycm (Automatic exposure control technique was implemented  in an effort to keep the radiation dose as low as possible without  compromising image quality)     REPORT: Spiral CT of the abdomen and pelvis was performed without  contrast from the lung bases through the pubic symphysis. Reconstructed  coronal and sagittal images are also reviewed.     Comparison: CT abdomen pelvis 01/19/2023.     Review of lung windows demonstrates normal aeration of the lung bases.  There is heavy calcified plaque within the coronary arteries. Previous  median sternotomy is noted. Cholecystectomy clips are present. Liver and  spleen are homogeneous, evaluation of the solid abdominal organs is  limited without intravenous contrast. The stomach is decompressed. There  is fatty replacement of the pancreas. The adrenal glands are  unremarkable. There is increased attenuation of perirenal fat planes,  nonspecific. There is a benign-appearing exophytic cyst at the  mid/inferior pole the right kidney, measuring 3.9 cm. This is stable. No  hydronephrosis is identified. There is a small cyst at the inferior pole  the left kidney that measures 1.3 cm. Mild distention of the bladder  without wall thickening. No free fluid or free air is identified. Bowel  loops are normal caliber, moderate stool volume is present. There is  mild fatty infiltration of the inguinal canals. Slightly increased  attenuation of subcutaneous fat planes over the anterior abdominal wall  is unchanged. This may reflect chronic scar tissue. Review of bone  windows shows no acute abnormality. Degenerative changes in the lumbar  spine exacerbate multilevel congenital spinal stenosis appears stable.  Mildly increased attenuation of body wall fat planes, may represent some  degree of generalized volume overload.       Impression:      1. No acute intra-abdominal or pelvic abnormality is identified.  2.  Probable mild constipation, no bowel obstruction.  3. Nonspecific increased attenuation and perinephric fat planes  bilaterally, no evidence urinary tract obstruction. There is a  benign-appearing cyst associated with each kidney.  4. Prior cholecystectomy.  5. Multilevel spinal stenosis, exacerbated by degenerative changes,  stable.        This report was finalized on 02/25/2023 02:18 by Dr. Percy Cesar MD.    XR Chest 1 View [[id number]] Collected: 02/24/23 2105     Updated: 02/24/23 2108    Narrative:      EXAMINATION: XR CHEST 1 VW- 2/24/2023 9:05 PM CST     HISTORY: Chest pain.     REPORT: A frontal view of the chest was obtained.     COMPARISON: Chest x-ray 02/02/2023.        The lungs are hypoaerated, no focal infiltrate or pulmonary  consolidation is identified. No pneumothorax or effusion is identified.  Heart size is normal. 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 The osseous  structures show no acute findings. ACDF hardware is present.       Impression:      Shallow inspiration, no acute cardiopulmonary abnormality.     This report was finalized on 02/24/2023 21:05 by Dr. Percy Cesar MD.          Culture:  No components found for: WOUNDCUL, 3  No components found for: CSFCUL, 3  No components found for: BC, 3  No components found for: URINECUL, 3      Assessment   1.  Acute kidney injury stage II.  2.  Intravascular volume depletion versus ATN.  3.  Stage IIIb chronic kidney disease baseline.  4.  Type II diabetic nephropathy.  5.  Morbid abdominal obesity.  6.  Orthostatic hypotension.  7.  Secondary hyperparathyroidism.    Plan:  1.  Agree to continue IV fluid.  2.  Agree to hold ARB/diuretics.  3.  Urinary electrolytes.  4.  Urinary protein to creatinine ratio.  5.  Continue to monitor renal function.      Thank you for the consult, we appreciate the opportunity to provide care to your patients.  Feel free to contact me if I can be of any further assistance.      Brian Lomas MD  2/25/2023  14:20  CST

## 2023-02-25 NOTE — ED PROVIDER NOTES
EMERGENCY DEPARTMENT HISTORY AND PHYSICAL EXAM   Patient Name: Erick Luong   Chief Complaint   Patient presents with   • Abnormal Lab      History of Presenting Illness:    Erick Luong is a 67 y.o. male with PMH significant for CAD, diabetes, hypertension, hyperlipidemia, obesity who presents to the ED for abnormal lab.  Patient states he was told by his primary doctor to present to the ER after checking blood work 2 days ago and found to have WANDER.  Patient denies chest pain, shortness of breath, fever.  He states he has not been sleeping well lately and feels very tired.  Denies decreased urination, dysuria.  Endorses generalized abdominal cramping.     Past Medical History:   Past Medical History:   Diagnosis Date   • Arthritis    • Autonomic disease    • CAD (coronary artery disease) 02/06/2017   • Cervical radiculopathy 09/16/2021   • Chronic constipation with acute exaccerbation 05/10/2021   • Coronary artery disease    • Degeneration of cervical intervertebral disc 08/11/2021   • Diabetes mellitus (HCC)    • Diabetic foot ulcer (HCC) 08/31/2020   • Diabetic polyneuropathy associated with type 2 diabetes mellitus (HCC) 01/18/2021   • Elevated cholesterol    • Gastroesophageal reflux disease 05/13/2019   • Headache    • HTN (hypertension), benign 05/03/2017   • Hyperlipidemia    • Hypertension    • Mixed hyperlipidemia 02/07/2017   • Multiple lung nodules 01/26/2020    5mm, 9 mm RLL identified 1/2020, not present 10/2019.   • Myocardial infarction (HCC)    • Osteomyelitis (HCC) 01/22/2020   • Osteomyelitis of fifth toe of right foot (HCC) 10/07/2019   • Pancreatitis    • Persistent insomnia 01/20/2020   • Renal disorder    • Sleep apnea 02/06/2017   • Sleep apnea with use of continuous positive airway pressure (CPAP)     NON-COMPLIANT   • Slow transit constipation 01/16/2019   • Spinal stenosis in cervical region 09/16/2021   • Vitamin D deficiency 03/02/2021        Past Surgical History:    Past Surgical  History:   Procedure Laterality Date   • ABDOMINAL SURGERY     • AMPUTATION FOOT / TOE Left 10/2021    5th digit    • ANTERIOR CERVICAL DISCECTOMY W/ FUSION N/A 2022    Procedure: CERVICAL DISCECTOMY ANTERIOR WITH FUSION C5-6 with possible plating of C5-7 with neuromonitoring and 1 c-arm;  Surgeon: Karel Soliz MD;  Location:  [facility] OR;  Service: Neurosurgery;  Laterality: N/A;   • APPENDECTOMY     • BACK SURGERY     • CARDIAC CATHETERIZATION Left 2021    Procedure: Left Heart Cath w poss intervention left anatomical snuff box acess;  Surgeon: Omkar Charles DO;  Location:  [facility] CATH INVASIVE LOCATION;  Service: Cardiology;  Laterality: Left;   • CARDIAC SURGERY     • CATARACT EXTRACTION     • CERVICAL SPINE SURGERY     • COLONOSCOPY N/A 2017    Normal exam repeat in 5 years   • COLONOSCOPY N/A 2019    Mild acute inflammation   • COLONOSCOPY W/ POLYPECTOMY  2014    Hyperplastic polyp   • CORONARY ARTERY BYPASS GRAFT  10/2015   • ENDOSCOPY  2011    Gastritis with hemorrhage   • ENDOSCOPY N/A 2017    Normal exam   • ENDOSCOPY N/A 2019    Gastritis   • ENDOSCOPY N/A 2020    Non-erosive gastritis with hemorrhage   • ENDOSCOPY N/A 02/10/2021    Esophagitis   • FOOT SURGERY Left    • INCISION AND DRAINAGE OF WOUND Left 2007    spider bite        Family History:    Family History   Problem Relation Age of Onset   • Colon cancer Father    • Heart disease Father    • Colon cancer Sister    • Colon polyps Sister    • Alzheimer's disease Mother    • Coronary artery disease Sister    • Coronary artery disease Sister         Social History:  Social History     Socioeconomic History   • Marital status:    Tobacco Use   • Smoking status: Former     Types: Cigarettes     Quit date:      Years since quittin.1   • Smokeless tobacco: Never   • Tobacco comments:     smoked in highschool   Vaping Use   • Vaping Use: Never used   Substance and Sexual  "Activity   • Alcohol use: No   • Drug use: No   • Sexual activity: Defer        Allergies:    Allergies   Allergen Reactions   • Cefepime Hives and Anaphylaxis   • Bactrim [Sulfamethoxazole-Trimethoprim] Other (See Comments)     \"RENAL FAILURE\"   • Vancomycin Itching   • Zolpidem Unknown - High Severity     \"makes him crazy\"   • Zolpidem Tartrate Unknown - Low Severity and Provider Review Needed   • Metronidazole Rash        Medications:      Current Facility-Administered Medications:   •  acetaminophen (TYLENOL) tablet 650 mg, 650 mg, Oral, Q4H PRN, Abdoulaye Ames MD, 650 mg at 02/25/23 2034  •  albuterol (PROVENTIL) nebulizer solution 0.083% 2.5 mg/3mL, 2.5 mg, Nebulization, Q4H PRN, Abdoulaye Ames MD  •  amitriptyline (ELAVIL) tablet 50 mg, 50 mg, Oral, Nightly, Abdoulaye Ames MD, 50 mg at 02/26/23 0013  •  aspirin chewable tablet 81 mg, 81 mg, Oral, Daily, Abdoulaye Ames MD, 81 mg at 02/26/23 0841  •  butalbital-acetaminophen-caffeine (FIORICET, ESGIC) -40 MG per tablet 2 tablet, 2 tablet, Oral, Q4H PRN, Abdoulaye Ames MD, 2 tablet at 02/26/23 0841  •  carvedilol (COREG) tablet 6.25 mg, 6.25 mg, Oral, BID With Meals, Abdoulaye Ames MD, 6.25 mg at 02/26/23 0841  •  cetirizine (zyrTEC) tablet 10 mg, 10 mg, Oral, Daily, Abdoulaye Ames MD, 10 mg at 02/26/23 0841  •  cyclobenzaprine (FLEXERIL) tablet 5 mg, 5 mg, Oral, BID PRN, Abdoulaye Ames MD, 5 mg at 02/26/23 0013  •  dextrose (D50W) (25 g/50 mL) IV injection 25 g, 25 g, Intravenous, Q15 Min PRN, Abdoulaye Ames MD  •  dextrose (GLUTOSE) oral gel 15 g, 15 g, Oral, Q15 Min PRN, Abdoulaye Ames MD  •  donepezil (ARICEPT) tablet 10 mg, 10 mg, Oral, Daily, Abdoulaye Ames MD, 10 mg at 02/26/23 0841  •  glucagon (human recombinant) (GLUCAGEN DIAGNOSTIC) injection 1 mg, 1 mg, Intramuscular, Q15 Min PRN, Abdoulaye Ames MD  •  HYDROcodone-acetaminophen (NORCO) 7.5-325 MG per tablet 1 tablet, 1 tablet, Oral, " "Q6H PRN, Abdoulaye Ames MD, 1 tablet at 02/26/23 0841  •  Insulin Lispro (humaLOG) injection 2-7 Units, 2-7 Units, Subcutaneous, TID Hui CANELA Chimalum R, MD, 3 Units at 02/26/23 0841  •  metoclopramide (REGLAN) injection 10 mg, 10 mg, Intravenous, Q6H, Abdoulaye Ames MD, 10 mg at 02/26/23 0842  •  midodrine (PROAMATINE) tablet 2.5 mg, 2.5 mg, Oral, TID Abebe CANELA Shaukat, MD, 2.5 mg at 02/26/23 0841  •  ondansetron (ZOFRAN) injection 4 mg, 4 mg, Intravenous, Q6H PRN, Abdoulaye Ames MD, 4 mg at 02/25/23 2034  •  pregabalin (LYRICA) capsule 50 mg, 50 mg, Oral, TID, Abdoulaye Ames MD, 50 mg at 02/26/23 0850  •  sodium chloride 0.9 % infusion, 100 mL/hr, Intravenous, Continuous, Abdoulaye Ames MD, Last Rate: 100 mL/hr at 02/25/23 2358, 100 mL/hr at 02/25/23 2358  •  tamsulosin (FLOMAX) 24 hr capsule 0.4 mg, 0.4 mg, Oral, Daily, Abdoulaye Ames MD, 0.4 mg at 02/26/23 0841  •  terazosin (HYTRIN) capsule 5 mg, 5 mg, Oral, Nightly, Abdoulaye Ames MD  •  traZODone (DESYREL) tablet 100 mg, 100 mg, Oral, Nightly PRN, Abdoulaye Ames MD, 100 mg at 02/26/23 0014     Review of Systems:   10-point review of systems personally reviewed and negative other than noted above in HPI     Physical Exam:    VS: /64 (BP Location: Left arm, Patient Position: Sitting)   Pulse 59   Temp 98 °F (36.7 °C) (Oral)   Resp 15   Ht 182.9 cm (72\")   Wt (!) 147 kg (323 lb 9.6 oz)   SpO2 98%   BMI 43.89 kg/m²    GENERAL: awake, appears fatigued  EYES: sclera anicteric, extra-occular movements grossly intact   EARS, NOSE, MOUTH, THROAT: atraumatic external nose and ears, dry mucous membranes   RESPIRATORY: Unlabored respiratory effort, no accessory muscle use, no audible stridor   CARDIOVASCULAR: Regular rate, warm extremities  GI: Nondistended, generalized tenderness to palpation in all quadrants without rebound tenderness or guarding  MUSCULOSKELETAL/EXTREMITIES: Extremities without obvious deformity, " no cyanosis   SKIN: warm and dry with no obvious rashes   NEUROLOGIC: moving all 4 extremities symmetrically, CN II-XII grossly intact     Labs:   Labs Reviewed   COMPREHENSIVE METABOLIC PANEL - Abnormal; Notable for the following components:       Result Value    Glucose 392 (*)     BUN 55 (*)     Creatinine 3.22 (*)     Chloride 96 (*)     eGFR 20.4 (*)     All other components within normal limits    Narrative:     GFR Normal >60  Chronic Kidney Disease <60  Kidney Failure <15     LIPASE - Abnormal; Notable for the following components:    Lipase 77 (*)     All other components within normal limits   URINALYSIS W/ MICROSCOPIC IF INDICATED (NO CULTURE) - Abnormal; Notable for the following components:    Glucose, UA >=1000 mg/dL (3+) (*)     Protein,  mg/dL (2+) (*)     All other components within normal limits   SINGLE HSTROPONIN T - Abnormal; Notable for the following components:    HS Troponin T 52 (*)     All other components within normal limits    Narrative:     High Sensitive Troponin T Reference Range:  <10.0 ng/L- Negative Female for AMI  <15.0 ng/L- Negative Male for AMI  >=10 - Abnormal Female indicating possible myocardial injury.  >=15 - Abnormal Male indicating possible myocardial injury.   Clinicians would have to utilize clinical acumen, EKG, Troponin, and serial changes to determine if it is an Acute Myocardial Infarction or myocardial injury due to an underlying chronic condition.        CBC WITH AUTO DIFFERENTIAL - Abnormal; Notable for the following components:    Hemoglobin 11.7 (*)     Hematocrit 36.1 (*)     Eosinophils, Absolute 0.44 (*)     All other components within normal limits   URINALYSIS, MICROSCOPIC ONLY - Abnormal; Notable for the following components:    RBC, UA 0-2 (*)     All other components within normal limits   SINGLE HSTROPONIN T - Abnormal; Notable for the following components:    HS Troponin T 44 (*)     All other components within normal limits    Narrative:      High Sensitive Troponin T Reference Range:  <10.0 ng/L- Negative Female for AMI  <15.0 ng/L- Negative Male for AMI  >=10 - Abnormal Female indicating possible myocardial injury.  >=15 - Abnormal Male indicating possible myocardial injury.   Clinicians would have to utilize clinical acumen, EKG, Troponin, and serial changes to determine if it is an Acute Myocardial Infarction or myocardial injury due to an underlying chronic condition.        IRON PROFILE - Abnormal; Notable for the following components:    Iron 57 (*)     Iron Saturation 17 (*)     All other components within normal limits   PTH, INTACT - Abnormal; Notable for the following components:    PTH, Intact 172.3 (*)     All other components within normal limits    Narrative:     Results may be falsely decreased if patient taking Biotin.     RENAL FUNCTION PANEL - Abnormal; Notable for the following components:    Glucose 250 (*)     BUN 49 (*)     Creatinine 2.82 (*)     Calcium 8.5 (*)     eGFR 23.9 (*)     All other components within normal limits    Narrative:     GFR Normal >60  Chronic Kidney Disease <60  Kidney Failure <15     CBC WITH AUTO DIFFERENTIAL - Abnormal; Notable for the following components:    Hemoglobin 11.5 (*)     Hematocrit 36.2 (*)     Eosinophil % 8.0 (*)     Eosinophils, Absolute 0.52 (*)     All other components within normal limits   POCT GLUCOSE FINGERSTICK - Abnormal; Notable for the following components:    Glucose 252 (*)     All other components within normal limits   POCT GLUCOSE FINGERSTICK - Abnormal; Notable for the following components:    Glucose 255 (*)     All other components within normal limits   POCT GLUCOSE FINGERSTICK - Abnormal; Notable for the following components:    Glucose 203 (*)     All other components within normal limits   POCT GLUCOSE FINGERSTICK - Abnormal; Notable for the following components:    Glucose 196 (*)     All other components within normal limits   POCT GLUCOSE FINGERSTICK - Abnormal;  Notable for the following components:    Glucose 325 (*)     All other components within normal limits   POCT GLUCOSE FINGERSTICK - Abnormal; Notable for the following components:    Glucose 216 (*)     All other components within normal limits   APTT - Normal   PROTIME-INR - Normal   BNP (IN-HOUSE) - Normal    Narrative:     Among patients with dyspnea, NT-proBNP is highly sensitive for the detection of acute congestive heart failure. In addition NT-proBNP of <300 pg/ml effectively rules out acute congestive heart failure with 99% negative predictive value.    Results may be falsely decreased if patient taking Biotin.     LACTIC ACID, PLASMA - Normal   SODIUM, URINE, RANDOM    Narrative:     Reference intervals for random urine have not been established.  Clinical usage is dependent upon physician's interpretation in combination with other laboratory tests.      PROTEIN, URINE, RANDOM    Narrative:     Reference intervals for random urine have not been established.  Clinical usage is dependent upon physician's interpretation in combination with other laboratory tests.      MICROALBUMIN / CREATININE URINE RATIO   CREATININE URINE RANDOM (KIDNEY FUNCTION) GFR COMPONENT   POCT GLUCOSE FINGERSTICK   POCT GLUCOSE FINGERSTICK   POCT GLUCOSE FINGERSTICK   POCT GLUCOSE FINGERSTICK   POCT GLUCOSE FINGERSTICK   POCT GLUCOSE FINGERSTICK   POCT GLUCOSE FINGERSTICK   POCT GLUCOSE FINGERSTICK   CBC AND DIFFERENTIAL    Narrative:     The following orders were created for panel order CBC & Differential.  Procedure                               Abnormality         Status                     ---------                               -----------         ------                     CBC Auto Differential[[id number]]        Abnormal            Final result                 Please view results for these tests on the individual orders.   CBC AND DIFFERENTIAL    Narrative:     The following orders were created for panel order CBC &  Differential.  Procedure                               Abnormality         Status                     ---------                               -----------         ------                     CBC Auto Differential[[id number]]        Abnormal            Final result                 Please view results for these tests on the individual orders.      Radiology:   CT Abdomen Pelvis Without Contrast   Final Result   1. No acute intra-abdominal or pelvic abnormality is identified.   2. Probable mild constipation, no bowel obstruction.   3. Nonspecific increased attenuation and perinephric fat planes   bilaterally, no evidence urinary tract obstruction. There is a   benign-appearing cyst associated with each kidney.   4. Prior cholecystectomy.   5. Multilevel spinal stenosis, exacerbated by degenerative changes,   stable.           This report was finalized on 02/25/2023 02:18 by Dr. Percy Cesar MD.      XR Chest 1 View   Final Result   Shallow inspiration, no acute cardiopulmonary abnormality.       This report was finalized on 02/24/2023 21:05 by Dr. Percy Cesar MD.          Medical Decision Making:   Erick Luong is a 67 y.o. male who presents to the ED for WANDER. Patient was non-toxic appearing on arrival. Vital signs unremarkable upon arrival. Patient's presentation raises concern for obstructive etiology versus intrinsic, or prerenal. CT abdomen pelvis obtained which was pending at time of signout.    Labs were personally reviewed and notable for:   ED Course as of 02/26/23 0958   Fri Feb 24, 2023 2114 Chest x-ray unremarkable. [EP]   2114 Creatinine elevated 3.2, increased from baseline over the last few weeks around 2.5.  Glucose elevated at 392.  Anion gap 13.  Hemoglobin low at 11.7.  High-sensitivity troponin elevated at 52.  Lipase elevated at 77.  Lactic normal.  BNP normal. [EP]   2114 EKG interpreted by me: Normal sinus rhythm rate of 69.  LBBB, not meeting Sgarbossa's criteria.  Left axis deviation.  [EP]      ED Course User Index  [EP] Matilda Darden MD      Given findings described above, patient's presentation is most consistent with prerenal WANDER, in the setting of CKD most likely secondary to diabetes and hypertension. The patient was given LR bolus for symptom control. On re-evaluation, patient remained hemodynamically stable and they are agreeable with current plan. Patient was signed out to Dr. Perez pending CT scan. I anticipate admission to \[facility]\"" for management of WANDER.    Signed:   Matilda Darden MD     This note was generated using speech recognition software and may contain homophonic word substitutions or errors.     (N17.9) WANDER (acute kidney injury) (HCC)    (R77.8) Elevated troponin    ED Disposition     ED Disposition   Decision to Admit    Condition   --    Comment   Level of Care: Telemetry [5]   Diagnosis: Elevated troponin [263537]   Admitting Physician: JENNA HALE [[id number]]   Attending Physician: JENNA HALE [id number]]                Matilda Darden MD  02/26/23 1000

## 2023-02-25 NOTE — H&P
[facility] Medicine Services  HISTORY AND PHYSICAL    Date of Admission: 2/24/2023  Primary Care Physician: Del Shetty MD    Subjective   Primary Historian: Patient    Chief Complaint: Abnormal lab    History of Present Illness  Patient is a 67-year-old male with medical history of obesity, CKD, CAD, hypertension and diabetes, presenting with complaints of abnormal labs.  Patient reports he was called by his PCP about his abnormal renal function and was recommended to present to the emergency room for evaluation.  Patient reports no changes in his urinary habits.  Reports no chest pain, shortness of breath or any other issues.      Review of Systems   Otherwise complete ROS reviewed and negative except as mentioned in the HPI.    Past Medical History:   Past Medical History:   Diagnosis Date   • Arthritis    • Autonomic disease    • CAD (coronary artery disease) 02/06/2017   • Cervical radiculopathy 09/16/2021   • Chronic constipation with acute exaccerbation 05/10/2021   • Coronary artery disease    • Degeneration of cervical intervertebral disc 08/11/2021   • Diabetes mellitus (HCC)    • Diabetic foot ulcer (HCC) 08/31/2020   • Diabetic polyneuropathy associated with type 2 diabetes mellitus (HCC) 01/18/2021   • Elevated cholesterol    • Gastroesophageal reflux disease 05/13/2019   • Headache    • HTN (hypertension), benign 05/03/2017   • Hyperlipidemia    • Hypertension    • Mixed hyperlipidemia 02/07/2017   • Multiple lung nodules 01/26/2020    5mm, 9 mm RLL identified 1/2020, not present 10/2019.   • Myocardial infarction (HCC)    • Osteomyelitis (HCC) 01/22/2020   • Osteomyelitis of fifth toe of right foot (HCC) 10/07/2019   • Pancreatitis    • Persistent insomnia 01/20/2020   • Renal disorder    • Sleep apnea 02/06/2017   • Sleep apnea with use of continuous positive airway pressure (CPAP)     NON-COMPLIANT   • Slow transit constipation 01/16/2019   • Spinal  stenosis in cervical region 09/16/2021   • Vitamin D deficiency 03/02/2021     Past Surgical History:  Past Surgical History:   Procedure Laterality Date   • ABDOMINAL SURGERY     • AMPUTATION FOOT / TOE Left 10/2021    5th digit    • ANTERIOR CERVICAL DISCECTOMY W/ FUSION N/A 8/5/2022    Procedure: CERVICAL DISCECTOMY ANTERIOR WITH FUSION C5-6 with possible plating of C5-7 with neuromonitoring and 1 c-arm;  Surgeon: Karel Soliz MD;  Location:  [facility] OR;  Service: Neurosurgery;  Laterality: N/A;   • APPENDECTOMY     • BACK SURGERY     • CARDIAC CATHETERIZATION Left 02/08/2021    Procedure: Left Heart Cath w poss intervention left anatomical snuff box acess;  Surgeon: Omkar Charles DO;  Location:  [facility] CATH INVASIVE LOCATION;  Service: Cardiology;  Laterality: Left;   • CARDIAC SURGERY     • CATARACT EXTRACTION     • CERVICAL SPINE SURGERY     • COLONOSCOPY N/A 01/31/2017    Normal exam repeat in 5 years   • COLONOSCOPY N/A 02/11/2019    Mild acute inflammation   • COLONOSCOPY W/ POLYPECTOMY  03/04/2014    Hyperplastic polyp   • CORONARY ARTERY BYPASS GRAFT  10/2015   • ENDOSCOPY  04/13/2011    Gastritis with hemorrhage   • ENDOSCOPY N/A 05/05/2017    Normal exam   • ENDOSCOPY N/A 02/11/2019    Gastritis   • ENDOSCOPY N/A 09/01/2020    Non-erosive gastritis with hemorrhage   • ENDOSCOPY N/A 02/10/2021    Esophagitis   • FOOT SURGERY Left    • INCISION AND DRAINAGE OF WOUND Left 09/2007    spider bite     Social History:  reports that he quit smoking about 32 years ago. His smoking use included cigarettes. He has never used smokeless tobacco. He reports that he does not drink alcohol and does not use drugs.    Family History: family history includes Alzheimer's disease in his mother; Colon cancer in his father and sister; Colon polyps in his sister; Coronary artery disease in his sister and sister; Heart disease in his father.       Allergies:  Allergies   Allergen Reactions   • Cefepime Hives and  "Anaphylaxis   • Bactrim [Sulfamethoxazole-Trimethoprim] Other (See Comments)     \"RENAL FAILURE\"   • Vancomycin Itching   • Zolpidem Unknown - High Severity     \"makes him crazy\"   • Zolpidem Tartrate Unknown - Low Severity and Provider Review Needed   • Metronidazole Rash       Medications:  Prior to Admission medications    Medication Sig Start Date End Date Taking? Authorizing Provider   albuterol sulfate  (90 Base) MCG/ACT inhaler Inhale 2 puffs Every 4 (Four) Hours As Needed for Wheezing. 1/26/23   Willy Rollins MD   amitriptyline (ELAVIL) 25 MG tablet Take 2 tablets by mouth Daily at bedtime. 2/16/23      Aspirin 81 MG capsule Take 81 mg by mouth Daily.    ProviderBossman MD   Azelastine HCl 137 MCG/SPRAY solution Use 2 sprays into the nostril(s) as directed by provider 2 (Two) Times a Day. 2/16/23      bumetanide (BUMEX) 2 MG tablet Take 1 tablet by mouth 2 (Two) Times a Day for 30 days. 1/26/23 2/25/23  Lexie Houston APRN   busPIRone (BUSPAR) 10 MG tablet Take 1 tablet by mouth 3 (Three) Times a Day As Needed. 2/9/23      calcitriol (ROCALTROL) 0.5 MCG capsule Take 1 capsule by mouth Daily. 2/23/23      carvedilol (COREG) 6.25 MG tablet Take 1 tablet by mouth 2 (Two) Times a Day. 12/22/22      Cholecalciferol 125 MCG (5000 UT) tablet Take 1 tablet by mouth Daily with meal 5/27/22      cloNIDine (CATAPRES) 0.1 MG tablet Every 12 (Twelve) Hours.    ProviderBossman MD   colchicine 0.6 MG tablet 1 tablet Orally    ProviderBossman MD   Continuous Blood Gluc  (Dexcom G6 ) device Take As Directed. 2/16/23      Continuous Blood Gluc Sensor (Dexcom G6 Sensor) Use As Directed. 2/16/23      Continuous Blood Gluc Transmit (Dexcom G6 Transmitter) misc Use As Directed. 2/16/23      cyclobenzaprine (FLEXERIL) 5 MG tablet Take 1 tablet by mouth 2 (Two) Times a Day As Needed for muscle spasms 1/28/22      donepezil (ARICEPT) 10 MG tablet Take 1 tablet by mouth Daily. 7/20/22   "    doxycycline (VIBRAMYCIN) 100 MG capsule doxycycline hyclate 100 mg capsule    Bossman Blount MD   Dulaglutide (Trulicity) 3 MG/0.5ML solution pen-injector Inject 3 mg under the skin into the appropriate area as directed Every 7 (Seven) Days. 10/19/22      DULoxetine (CYMBALTA) 60 MG capsule Take 1 capsule by mouth Daily. 2/6/23      fexofenadine (ALLEGRA) 180 MG tablet Take 1 tablet by mouth Daily. 2/16/23      gabapentin (NEURONTIN) 100 MG capsule gabapentin 100 mg capsule   TAKE  1 CAPSULE BY MOUTH TWICE A DAY.    Bossman Blount MD   HYDROcodone-acetaminophen (NORCO) 7.5-325 MG per tablet Take 1 tablet by mouth 2 (Two) Times a Day As Needed. 2/15/23      insulin aspart (novoLOG FLEXPEN) 100 UNIT/ML solution pen-injector sc pen Inject up to 150 units subcutaneously twice daily according to sliding scale instructions from provider. 5/27/22      Insulin Pen Needle 31G X 5 MM misc Use three times a day. 11/2/21      Insulin Regular Human, Conc, (HumuLIN R U-500 KwikPen) 500 UNIT/ML solution pen-injector CONCENTRATED injection Inject 120 Units under the skin into the appropriate area as directed 2 (Two) Times a Day with breakfast and dinner. 9/2/22      irbesartan (AVAPRO) 300 MG tablet Daily.    Bossman Blount MD   lactulose (CHRONULAC) 10 GM/15ML solution solution (encephalopathy) Take 30 ml by mouth once daily for 1 month 2/3/23      losartan (COZAAR) 100 MG tablet losartan 100 mg tablet    Bossman Blount MD   lubiprostone (Amitiza) 24 MCG capsule Take 1 capsule by mouth 2 (Two) Times a Day--Replaces Trulance 2/2/23      methylcellulose (Citrucel) oral powder Take 5 g twice a day by oral route for 90 days. 2/9/23      methylcellulose, Laxative, (CITRUCEL) 500 MG tablet tablet 1 tablet Daily.    Bossman Blount MD   metOLazone (ZAROXOLYN) 2.5 MG tablet metolazone 2.5 mg tablet    Bossman Blount MD   midodrine (PROAMATINE) 10 MG tablet midodrine 10 mg tablet    Jose Alejandro  MD Bossman   nebivolol (BYSTOLIC) 5 MG tablet Bystolic 5 mg tablet   TAKE 1 TABLET BY MOUTH EVERY DAY    ProviderBossman MD   ondansetron (Zofran) 4 MG tablet Take 1 tablet by mouth 3 (Three) Times a Day. 12/27/22      pantoprazole (Protonix) 40 MG EC tablet Take 1 tablet by mouth 2 (Two) Times a Day. 11/8/22      Plecanatide (Trulance) 3 MG tablet Take 1 tablet by mouth Daily. 2/2/23      polyethylene glycol (GaviLyte-G) 236 g solution GaviLyte-G 236 gram-22.74 gram-6.74 gram-5.86 gram oral solution    ProviderBossman MD   polyethylene glycol (MIRALAX) 17 GM/SCOOP powder Take 17 g by mouth 2 (Two) Times a Day. 2/9/23      polyethylene glycol-electrolytes (NULYTELY) 420 g solution See Admin Instructions. 2/2/23   ProviderBossman MD   prazosin (MINIPRESS) 1 MG capsule Take 1 capsule by mouth Daily at bedtime. 2/16/23      prazosin (MINIPRESS) 2 MG capsule Take 1 capsule by mouth every night at bedtime. 1/4/23      pregabalin (LYRICA) 50 MG capsule Take 1 capsule by mouth 3 (Three) Times a Day. 1/4/23      rosuvastatin (CRESTOR) 40 MG tablet Take 1 tablet by mouth Every Night. 3/18/22      Sodium Phosphates (fleet enema) 7-19 GM/118ML enema Insert 133 mL by rectal route tonight.  If no BM, repeat in the AM. 2/2/23      Sodium Phosphates (Fleets) enema enema 133 mL.    ProviderBossman MD   sucralfate (CARAFATE) 1 GM/10ML suspension Take 10 mL by mouth 4 (Four) Times a Day Before Meals & at Bedtime for 30 days. 1/26/23 2/25/23  Lexie Houston APRN   tamsulosin (Flomax) 0.4 MG capsule 24 hr capsule Take 1 capsule by mouth Daily. 11/8/22      timolol (TIMOPTIC) 0.5 % ophthalmic solution Administer 1 drop to both eyes 2 (Two) Times a Day. 1/17/23      traZODone (DESYREL) 100 MG tablet Take 1 tablet by mouth once daily at bedtime 2/9/23      Zinc Sulfate 220 (50 Zn) MG tablet Take 1 tablet by mouth Daily. 5/27/22      spironolactone (ALDACTONE) 25 MG tablet Take 1 tablet by mouth Daily. 9/28/20  "12/31/20  Del Shetty MD     I have utilized all available immediate resources to obtain, update, or review the patient's current medications (including all prescriptions, over-the-counter products, herbals, cannabis/cannabidiol products, and vitamin/mineral/dietary (nutritional) supplements).    Objective     Vital Signs: /75   Pulse 73   Temp 97.1 °F (36.2 °C) (Oral)   Resp 18   Ht 182.9 cm (72\")   Wt (!) 148 kg (326 lb)   SpO2 97%   BMI 44.21 kg/m²   Physical Exam:   Temp:  [97.1 °F (36.2 °C)-98.1 °F (36.7 °C)] 98.1 °F (36.7 °C)  Heart Rate:  [65-91] 77  Resp:  [18] 18  BP: (121-150)/(52-77) 138/61  Physical Exam  General: Morbid obesity, NC/AT, appears stated age, alert and oriented x3  HEENT: PERRLA, EOMI, no scleral icterus, no conjunctival injection,   NECK:  Neck is supple, no JVD, no supraclavicular lymphadenopathy  CV: S1 S2 RRR, no murmurs, no gallops, no heaves, pulses palpable.  LUNG: CTA, no wheezing crackles or rhonchi  ABD: Nondistended, nontender, bowel sounds present, no guarding or rebound, organomegaly not appreciated.   EXT: FROM, strength is intact, no pitting edema, no joint effusion, no calf tenderness.  Pulses palpable  Neuro: CN 2-12, grossly intact,no  focal weakness appreciated  Skin: Warm and dry, no rash or lesions.      Results Reviewed:  Lab Results (last 24 hours)     Procedure Component Value Units Date/Time    Single High Sensitivity Troponin T [[id number]]  (Abnormal) Collected: 02/24/23 2348    Specimen: Blood Updated: 02/25/23 0013     HS Troponin T 44 ng/L     Narrative:      High Sensitive Troponin T Reference Range:  <10.0 ng/L- Negative Female for AMI  <15.0 ng/L- Negative Male for AMI  >=10 - Abnormal Female indicating possible myocardial injury.  >=15 - Abnormal Male indicating possible myocardial injury.   Clinicians would have to utilize clinical acumen, EKG, Troponin, and serial changes to determine if it is an Acute Myocardial Infarction or myocardial " injury due to an underlying chronic condition.         Urinalysis With Microscopic If Indicated (No Culture) - Urine, Clean Catch [[id number]]  (Abnormal) Collected: 02/24/23 2109    Specimen: Urine, Clean Catch Updated: 02/24/23 2123     Color, UA Yellow     Appearance, UA Clear     pH, UA 6.0     Specific Gravity, UA 1.020     Glucose, UA >=1000 mg/dL (3+)     Ketones, UA Negative     Bilirubin, UA Negative     Blood, UA Negative     Protein,  mg/dL (2+)     Leuk Esterase, UA Negative     Nitrite, UA Negative     Urobilinogen, UA 1.0 E.U./dL    Urinalysis, Microscopic Only - Urine, Clean Catch [[id number]]  (Abnormal) Collected: 02/24/23 2109    Specimen: Urine, Clean Catch Updated: 02/24/23 2123     RBC, UA 0-2 /HPF      WBC, UA None Seen /HPF      Bacteria, UA None Seen /HPF      Squamous Epithelial Cells, UA 0-2 /HPF      Hyaline Casts, UA 0-2 /LPF      Methodology Automated Microscopy    Comprehensive Metabolic Panel [[id number]]  (Abnormal) Collected: 02/24/23 2017    Specimen: Blood Updated: 02/24/23 2048     Glucose 392 mg/dL      BUN 55 mg/dL      Creatinine 3.22 mg/dL      Sodium 136 mmol/L      Potassium 4.9 mmol/L      Chloride 96 mmol/L      CO2 27.0 mmol/L      Calcium 8.8 mg/dL      Total Protein 6.6 g/dL      Albumin 4.1 g/dL      ALT (SGPT) 11 U/L      AST (SGOT) 17 U/L      Alkaline Phosphatase 79 U/L      Total Bilirubin 0.4 mg/dL      Globulin 2.5 gm/dL      A/G Ratio 1.6 g/dL      BUN/Creatinine Ratio 17.1     Anion Gap 13.0 mmol/L      eGFR 20.4 mL/min/1.73     Narrative:      GFR Normal >60  Chronic Kidney Disease <60  Kidney Failure <15      Lactic Acid, Plasma [[id number]]  (Normal) Collected: 02/24/23 2017    Specimen: Blood Updated: 02/24/23 2047     Lactate 1.6 mmol/L     BNP [[id number]]  (Normal) Collected: 02/24/23 2017    Specimen: Blood Updated: 02/24/23 2043     proBNP 592.3 pg/mL     Narrative:      Among patients with dyspnea, NT-proBNP is highly sensitive for the detection of  acute congestive heart failure. In addition NT-proBNP of <300 pg/ml effectively rules out acute congestive heart failure with 99% negative predictive value.    Results may be falsely decreased if patient taking Biotin.      Lipase [[id number]]  (Abnormal) Collected: 02/24/23 2017    Specimen: Blood Updated: 02/24/23 2043     Lipase 77 U/L     Single High Sensitivity Troponin T [[id number]]  (Abnormal) Collected: 02/24/23 2017    Specimen: Blood Updated: 02/24/23 2042     HS Troponin T 52 ng/L     Narrative:      High Sensitive Troponin T Reference Range:  <10.0 ng/L- Negative Female for AMI  <15.0 ng/L- Negative Male for AMI  >=10 - Abnormal Female indicating possible myocardial injury.  >=15 - Abnormal Male indicating possible myocardial injury.   Clinicians would have to utilize clinical acumen, EKG, Troponin, and serial changes to determine if it is an Acute Myocardial Infarction or myocardial injury due to an underlying chronic condition.         aPTT [[id number]]  (Normal) Collected: 02/24/23 2017    Specimen: Blood Updated: 02/24/23 2035     PTT 27.6 seconds     Protime-INR [[id number]]  (Normal) Collected: 02/24/23 2017    Specimen: Blood Updated: 02/24/23 2035     Protime 13.6 Seconds      INR 1.03    CBC & Differential [[id number]]  (Abnormal) Collected: 02/24/23 2017    Specimen: Blood Updated: 02/24/23 2026    Narrative:      The following orders were created for panel order CBC & Differential.  Procedure                               Abnormality         Status                     ---------                               -----------         ------                     CBC Auto Differential[[id number]]        Abnormal            Final result                 Please view results for these tests on the individual orders.    CBC Auto Differential [[id number]]  (Abnormal) Collected: 02/24/23 2017    Specimen: Blood Updated: 02/24/23 2026     WBC 8.54 10*3/mm3      RBC 4.14 10*6/mm3      Hemoglobin 11.7 g/dL       Hematocrit 36.1 %      MCV 87.2 fL      MCH 28.3 pg      MCHC 32.4 g/dL      RDW 13.7 %      RDW-SD 43.6 fl      MPV 11.7 fL      Platelets 200 10*3/mm3      Neutrophil % 63.2 %      Lymphocyte % 21.4 %      Monocyte % 9.3 %      Eosinophil % 5.2 %      Basophil % 0.5 %      Immature Grans % 0.4 %      Neutrophils, Absolute 5.41 10*3/mm3      Lymphocytes, Absolute 1.83 10*3/mm3      Monocytes, Absolute 0.79 10*3/mm3      Eosinophils, Absolute 0.44 10*3/mm3      Basophils, Absolute 0.04 10*3/mm3      Immature Grans, Absolute 0.03 10*3/mm3      nRBC 0.0 /100 WBC         Imaging Results (Last 24 Hours)     Procedure Component Value Units Date/Time    CT Abdomen Pelvis Without Contrast [[id number]] Resulted: 02/24/23 2230     Updated: 02/24/23 2233    XR Chest 1 View [[id number]] Collected: 02/24/23 2105     Updated: 02/24/23 2108    Narrative:      EXAMINATION: XR CHEST 1 VW- 2/24/2023 9:05 PM CST     HISTORY: Chest pain.     REPORT: A frontal view of the chest was obtained.     COMPARISON: Chest x-ray 02/02/2023.        The lungs are hypoaerated, no focal infiltrate or pulmonary  consolidation is identified. No pneumothorax or effusion is identified.  Heart size is normal. 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 The osseous  structures show no acute findings. ACDF hardware is present.       Impression:      Shallow inspiration, no acute cardiopulmonary abnormality.     This report was finalized on 02/24/2023 21:05 by Dr. Percy Cesar MD.        I have personally reviewed and interpreted the radiology studies and ECG obtained at time of admission.     Assessment / Plan   Assessment and plan:   67-year-old male with medical history of diabetes, hypertension, morbid obesity, and CKD stage III, presenting with complaints of having abnormal lab with worsening renal function. Patient takes Bumex at home and was instructed by his doctor to stop his Bumex as of today. He is admitted for further medical eval and  management.      Problem list:  Active Hospital Problems    Diagnosis    • **WANDER (acute kidney injury) (HCC)    Acute on CKD  Morbid obesity   Diabetes, insulin-dependent        Treatment Plan  The patient will be admitted to my service here at [facility].  -Admit observation  -IV gentle hydration  - Continue holding Bumex  - Hold ACE inhibitor's  - I reviewed the rest of his home medications, restart those appropriate for management of his other comorbid condition  - Consult nephrology for eval recommendation        DVT and GI prophylaxis covered   Patient is a full code  Anticipated length of stay less than 2 nights  Time spent evaluation greater than 30 minutes.      Medical Decision Making  As discussed above      Conditions and Status  Patient is clinically stable     MDM Data  External documents reviewed: As discussed above  Cardiac tracing (EKG, telemetry) interpretation: As discussed above  Radiology interpretation: As discussed above  Labs reviewed: As discussed above         Care Planning  Patient is a full code      Disposition  Patient can discharge home after resolution of acute condition and returned to baseline.  No  needed identified at this time.            Electronically signed by Abdoulaye Ames MD, 02/25/23, 01:51 CST.

## 2023-02-25 NOTE — PROGRESS NOTES
[facility] Medicine Services  INPATIENT PROGRESS NOTE    Patient Name: Erick Luong  Date of Admission: 2/24/2023  Today's Date: 02/25/23  Length of Stay: 0  Primary Care Physician: Del Shetty MD    Subjective   Chief Complaint: Abnormal labs  HPI   Patient is without complaints.  He was sent in for abnormal lab work.  Admitted after midnight.        Review of Systems   All pertinent negatives and positives are as above. All other systems have been reviewed and are negative unless otherwise stated.     Objective    Temp:  [97.1 °F (36.2 °C)-98.3 °F (36.8 °C)] 97.6 °F (36.4 °C)  Heart Rate:  [65-91] 66  Resp:  [16-18] 16  BP: (110-150)/(51-96) 114/53  Physical Exam  Vitals and nursing note reviewed.   Constitutional:       Appearance: Normal appearance.   HENT:      Head: Normocephalic and atraumatic.      Right Ear: External ear normal.      Left Ear: External ear normal.      Nose: Nose normal.      Mouth/Throat:      Mouth: Mucous membranes are moist.   Eyes:      Extraocular Movements: Extraocular movements intact.      Conjunctiva/sclera: Conjunctivae normal.      Pupils: Pupils are equal, round, and reactive to light.   Cardiovascular:      Rate and Rhythm: Normal rate and regular rhythm.      Pulses: Normal pulses.      Heart sounds: No murmur heard.    No friction rub. No gallop.   Pulmonary:      Effort: Pulmonary effort is normal.      Breath sounds: Normal breath sounds.   Abdominal:      General: Bowel sounds are normal.      Palpations: Abdomen is soft.   Musculoskeletal:         General: Normal range of motion.      Cervical back: Normal range of motion and neck supple.   Skin:     General: Skin is warm and dry.      Capillary Refill: Capillary refill takes less than 2 seconds.      Comments: Patient has abrasions on forehead and chin from fall.   Neurological:      General: No focal deficit present.      Mental Status: He is alert and oriented to person,  place, and time.      Cranial Nerves: No cranial nerve deficit.   Psychiatric:         Mood and Affect: Mood normal.         Behavior: Behavior normal.             Results Review:  I have reviewed the labs, radiology results, and diagnostic studies.    Laboratory Data:   Results from last 7 days   Lab Units 02/24/23  2017 02/22/23  1007   WBC 10*3/mm3 8.54 7.83   HEMOGLOBIN g/dL 11.7* 12.1*   HEMATOCRIT % 36.1* 35.8*   PLATELETS 10*3/mm3 200 214        Results from last 7 days   Lab Units 02/24/23  2017 02/22/23  1007   SODIUM mmol/L 136 140   POTASSIUM mmol/L 4.9 4.2   CHLORIDE mmol/L 96* 101   CO2 mmol/L 27.0 25.6   BUN mg/dL 55* 68*   CREATININE mg/dL 3.22* 3.58*   CALCIUM mg/dL 8.8 8.9   BILIRUBIN mg/dL 0.4 0.4   ALK PHOS U/L 79 72   ALT (SGPT) U/L 11 11   AST (SGOT) U/L 17 13   GLUCOSE mg/dL 392* 69       Culture Data:   No results found for: BLOODCX, URINECX, WOUNDCX, MRSACX, RESPCX, STOOLCX    Radiology Data:   Imaging Results (Last 24 Hours)     Procedure Component Value Units Date/Time    CT Abdomen Pelvis Without Contrast [[id number]] Collected: 02/25/23 0204     Updated: 02/25/23 0221    Narrative:      EXAMINATION: CT ABDOMEN PELVIS WO CONTRAST- 2/25/2023 2:04 AM CST     HISTORY: omar; N17.9-Acute kidney failure, unspecified; R77.8-Other  specified abnormalities of plasma proteins     DOSE: 1475 mGycm (Automatic exposure control technique was implemented  in an effort to keep the radiation dose as low as possible without  compromising image quality)     REPORT: Spiral CT of the abdomen and pelvis was performed without  contrast from the lung bases through the pubic symphysis. Reconstructed  coronal and sagittal images are also reviewed.     Comparison: CT abdomen pelvis 01/19/2023.     Review of lung windows demonstrates normal aeration of the lung bases.  There is heavy calcified plaque within the coronary arteries. Previous  median sternotomy is noted. Cholecystectomy clips are present. Liver and  spleen  are homogeneous, evaluation of the solid abdominal organs is  limited without intravenous contrast. The stomach is decompressed. There  is fatty replacement of the pancreas. The adrenal glands are  unremarkable. There is increased attenuation of perirenal fat planes,  nonspecific. There is a benign-appearing exophytic cyst at the  mid/inferior pole the right kidney, measuring 3.9 cm. This is stable. No  hydronephrosis is identified. There is a small cyst at the inferior pole  the left kidney that measures 1.3 cm. Mild distention of the bladder  without wall thickening. No free fluid or free air is identified. Bowel  loops are normal caliber, moderate stool volume is present. There is  mild fatty infiltration of the inguinal canals. Slightly increased  attenuation of subcutaneous fat planes over the anterior abdominal wall  is unchanged. This may reflect chronic scar tissue. Review of bone  windows shows no acute abnormality. Degenerative changes in the lumbar  spine exacerbate multilevel congenital spinal stenosis appears stable.  Mildly increased attenuation of body wall fat planes, may represent some  degree of generalized volume overload.       Impression:      1. No acute intra-abdominal or pelvic abnormality is identified.  2. Probable mild constipation, no bowel obstruction.  3. Nonspecific increased attenuation and perinephric fat planes  bilaterally, no evidence urinary tract obstruction. There is a  benign-appearing cyst associated with each kidney.  4. Prior cholecystectomy.  5. Multilevel spinal stenosis, exacerbated by degenerative changes,  stable.        This report was finalized on 02/25/2023 02:18 by Dr. Percy Cesar MD.    XR Chest 1 View [[id number]] Collected: 02/24/23 2105     Updated: 02/24/23 2108    Narrative:      EXAMINATION: XR CHEST 1 VW- 2/24/2023 9:05 PM CST     HISTORY: Chest pain.     REPORT: A frontal view of the chest was obtained.     COMPARISON: Chest x-ray 02/02/2023.        The  lungs are hypoaerated, no focal infiltrate or pulmonary  consolidation is identified. No pneumothorax or effusion is identified.  Heart size is normal. 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 The osseous  structures show no acute findings. ACDF hardware is present.       Impression:      Shallow inspiration, no acute cardiopulmonary abnormality.     This report was finalized on 02/24/2023 21:05 by Dr. Percy Cesar MD.          I have reviewed the patient's current medications.     Assessment/Plan   Assessment  Active Hospital Problems    Diagnosis    • **WANDER (acute kidney injury) (Formerly [facility])        Treatment Plan  Nephrology consult    Medical Decision Making  Number and Complexity of problems:   Acute kidney injury neurology moderate complexity  Differential Diagnosis: None    Conditions and Status        Condition is unchanged.     [facility] Data  External documents reviewed: None  Cardiac tracing (EKG, telemetry) interpretation: Sinus rhythm  Radiology interpretation: Reviewed  Labs reviewed: Reviewed  Any tests that were considered but not ordered: None     Decision rules/scores evaluated (example [id number], Wells, etc): None     Discussed with: Patient     Care Planning  Shared decision making: Patient  Code status and discussions: Full    Disposition  Social Determinants of Health that impact treatment or disposition: None  I expect the patient to be discharged to home in 1-2 days.     Electronically signed by Alejandrina Barnett, 02/25/23, 12:32 CST.

## 2023-02-25 NOTE — ED NOTES
Pt was sent to the ED by his PCP d/t abnormal labs. Dr. Lomas is aware of pt and says pt needs to be admitted. Pt is awake and alert. Pt denies any pain, incident or injury. Pt does not have any other complaints or concerns at this time. No obvious DCAPBTLS noted. PMSx4.

## 2023-02-26 LAB
ALBUMIN SERPL-MCNC: 3.9 G/DL (ref 3.5–5.2)
ALBUMIN UR-MCNC: 49.9 MG/DL
ANION GAP SERPL CALCULATED.3IONS-SCNC: 10 MMOL/L (ref 5–15)
BASOPHILS # BLD AUTO: 0.04 10*3/MM3 (ref 0–0.2)
BASOPHILS NFR BLD AUTO: 0.6 % (ref 0–1.5)
BUN SERPL-MCNC: 49 MG/DL (ref 8–23)
BUN/CREAT SERPL: 17.4 (ref 7–25)
CALCIUM SPEC-SCNC: 8.5 MG/DL (ref 8.6–10.5)
CHLORIDE SERPL-SCNC: 104 MMOL/L (ref 98–107)
CO2 SERPL-SCNC: 23 MMOL/L (ref 22–29)
CREAT SERPL-MCNC: 2.82 MG/DL (ref 0.76–1.27)
CREAT UR-MCNC: 106.1 MG/DL
CREAT UR-MCNC: 116.5 MG/DL
DEPRECATED RDW RBC AUTO: 44.7 FL (ref 37–54)
EGFRCR SERPLBLD CKD-EPI 2021: 23.9 ML/MIN/1.73
EOSINOPHIL # BLD AUTO: 0.52 10*3/MM3 (ref 0–0.4)
EOSINOPHIL NFR BLD AUTO: 8 % (ref 0.3–6.2)
ERYTHROCYTE [DISTWIDTH] IN BLOOD BY AUTOMATED COUNT: 13.9 % (ref 12.3–15.4)
GLUCOSE BLDC GLUCOMTR-MCNC: 216 MG/DL (ref 70–130)
GLUCOSE BLDC GLUCOMTR-MCNC: 234 MG/DL (ref 70–130)
GLUCOSE BLDC GLUCOMTR-MCNC: 341 MG/DL (ref 70–130)
GLUCOSE SERPL-MCNC: 250 MG/DL (ref 65–99)
HCT VFR BLD AUTO: 36.2 % (ref 37.5–51)
HGB BLD-MCNC: 11.5 G/DL (ref 13–17.7)
IMM GRANULOCYTES # BLD AUTO: 0.02 10*3/MM3 (ref 0–0.05)
IMM GRANULOCYTES NFR BLD AUTO: 0.3 % (ref 0–0.5)
LYMPHOCYTES # BLD AUTO: 1.72 10*3/MM3 (ref 0.7–3.1)
LYMPHOCYTES NFR BLD AUTO: 26.6 % (ref 19.6–45.3)
MCH RBC QN AUTO: 27.8 PG (ref 26.6–33)
MCHC RBC AUTO-ENTMCNC: 31.8 G/DL (ref 31.5–35.7)
MCV RBC AUTO: 87.4 FL (ref 79–97)
MICROALBUMIN/CREAT UR: 428.3 MG/G
MONOCYTES # BLD AUTO: 0.6 10*3/MM3 (ref 0.1–0.9)
MONOCYTES NFR BLD AUTO: 9.3 % (ref 5–12)
NEUTROPHILS NFR BLD AUTO: 3.57 10*3/MM3 (ref 1.7–7)
NEUTROPHILS NFR BLD AUTO: 55.2 % (ref 42.7–76)
NRBC BLD AUTO-RTO: 0 /100 WBC (ref 0–0.2)
PHOSPHATE SERPL-MCNC: 2.9 MG/DL (ref 2.5–4.5)
PLATELET # BLD AUTO: 191 10*3/MM3 (ref 140–450)
PMV BLD AUTO: 11.9 FL (ref 6–12)
POTASSIUM SERPL-SCNC: 4.9 MMOL/L (ref 3.5–5.2)
RBC # BLD AUTO: 4.14 10*6/MM3 (ref 4.14–5.8)
SODIUM SERPL-SCNC: 137 MMOL/L (ref 136–145)
WBC NRBC COR # BLD: 6.47 10*3/MM3 (ref 3.4–10.8)

## 2023-02-26 PROCEDURE — 63710000001 INSULIN LISPRO (HUMAN) PER 5 UNITS: Performed by: INTERNAL MEDICINE

## 2023-02-26 PROCEDURE — 80069 RENAL FUNCTION PANEL: CPT | Performed by: INTERNAL MEDICINE

## 2023-02-26 PROCEDURE — 25010000002 ONDANSETRON PER 1 MG: Performed by: INTERNAL MEDICINE

## 2023-02-26 PROCEDURE — 25010000002 METOCLOPRAMIDE PER 10 MG: Performed by: INTERNAL MEDICINE

## 2023-02-26 PROCEDURE — 85025 COMPLETE CBC W/AUTO DIFF WBC: CPT | Performed by: INTERNAL MEDICINE

## 2023-02-26 PROCEDURE — 25010000002 METOCLOPRAMIDE PER 10 MG: Performed by: FAMILY MEDICINE

## 2023-02-26 PROCEDURE — 82962 GLUCOSE BLOOD TEST: CPT

## 2023-02-26 RX ORDER — HYDROCODONE BITARTRATE AND ACETAMINOPHEN 7.5; 325 MG/1; MG/1
1 TABLET ORAL EVERY 6 HOURS PRN
Status: DISCONTINUED | OUTPATIENT
Start: 2023-02-26 | End: 2023-02-27 | Stop reason: HOSPADM

## 2023-02-26 RX ORDER — BUTALBITAL, ACETAMINOPHEN AND CAFFEINE 50; 325; 40 MG/1; MG/1; MG/1
2 TABLET ORAL EVERY 4 HOURS PRN
Status: DISCONTINUED | OUTPATIENT
Start: 2023-02-26 | End: 2023-02-27 | Stop reason: HOSPADM

## 2023-02-26 RX ORDER — METOCLOPRAMIDE HYDROCHLORIDE 5 MG/ML
5 INJECTION INTRAMUSCULAR; INTRAVENOUS EVERY 6 HOURS
Status: DISCONTINUED | OUTPATIENT
Start: 2023-02-26 | End: 2023-02-27 | Stop reason: HOSPADM

## 2023-02-26 RX ADMIN — HYDROCODONE BITARTRATE AND ACETAMINOPHEN 1 TABLET: 7.5; 325 TABLET ORAL at 22:02

## 2023-02-26 RX ADMIN — CARVEDILOL 6.25 MG: 6.25 TABLET, FILM COATED ORAL at 18:06

## 2023-02-26 RX ADMIN — BUTALBITAL, ACETAMINOPHEN, AND CAFFEINE 2 TABLET: 50; 325; 40 TABLET ORAL at 08:41

## 2023-02-26 RX ADMIN — HYDROCODONE BITARTRATE AND ACETAMINOPHEN 1 TABLET: 7.5; 325 TABLET ORAL at 15:09

## 2023-02-26 RX ADMIN — AMITRIPTYLINE HYDROCHLORIDE 50 MG: 10 TABLET, FILM COATED ORAL at 00:13

## 2023-02-26 RX ADMIN — INSULIN LISPRO 3 UNITS: 100 INJECTION, SOLUTION INTRAVENOUS; SUBCUTANEOUS at 08:41

## 2023-02-26 RX ADMIN — METOCLOPRAMIDE 10 MG: 5 INJECTION, SOLUTION INTRAMUSCULAR; INTRAVENOUS at 13:17

## 2023-02-26 RX ADMIN — PREGABALIN 50 MG: 50 CAPSULE ORAL at 20:17

## 2023-02-26 RX ADMIN — MIDODRINE HYDROCHLORIDE 2.5 MG: 2.5 TABLET ORAL at 18:06

## 2023-02-26 RX ADMIN — INSULIN LISPRO 3 UNITS: 100 INJECTION, SOLUTION INTRAVENOUS; SUBCUTANEOUS at 18:06

## 2023-02-26 RX ADMIN — SODIUM CHLORIDE 100 ML/HR: 9 INJECTION, SOLUTION INTRAVENOUS at 20:20

## 2023-02-26 RX ADMIN — ONDANSETRON 4 MG: 2 INJECTION INTRAMUSCULAR; INTRAVENOUS at 17:04

## 2023-02-26 RX ADMIN — BUTALBITAL, ACETAMINOPHEN, AND CAFFEINE 2 TABLET: 50; 325; 40 TABLET ORAL at 15:09

## 2023-02-26 RX ADMIN — CARVEDILOL 6.25 MG: 6.25 TABLET, FILM COATED ORAL at 08:41

## 2023-02-26 RX ADMIN — TRAZODONE HYDROCHLORIDE 100 MG: 100 TABLET ORAL at 00:14

## 2023-02-26 RX ADMIN — HYDROCODONE BITARTRATE AND ACETAMINOPHEN 1 TABLET: 7.5; 325 TABLET ORAL at 03:05

## 2023-02-26 RX ADMIN — ASPIRIN 81 MG: 81 TABLET, CHEWABLE ORAL at 08:41

## 2023-02-26 RX ADMIN — BUTALBITAL, ACETAMINOPHEN, AND CAFFEINE 2 TABLET: 50; 325; 40 TABLET ORAL at 04:00

## 2023-02-26 RX ADMIN — METOCLOPRAMIDE 10 MG: 5 INJECTION, SOLUTION INTRAMUSCULAR; INTRAVENOUS at 08:42

## 2023-02-26 RX ADMIN — TERAZOSIN HYDROCHLORIDE 5 MG: 5 CAPSULE ORAL at 20:17

## 2023-02-26 RX ADMIN — METOCLOPRAMIDE 10 MG: 5 INJECTION, SOLUTION INTRAMUSCULAR; INTRAVENOUS at 00:14

## 2023-02-26 RX ADMIN — AMITRIPTYLINE HYDROCHLORIDE 50 MG: 10 TABLET, FILM COATED ORAL at 20:17

## 2023-02-26 RX ADMIN — MIDODRINE HYDROCHLORIDE 2.5 MG: 2.5 TABLET ORAL at 13:16

## 2023-02-26 RX ADMIN — DONEPEZIL HYDROCHLORIDE 10 MG: 10 TABLET ORAL at 08:41

## 2023-02-26 RX ADMIN — INSULIN LISPRO 5 UNITS: 100 INJECTION, SOLUTION INTRAVENOUS; SUBCUTANEOUS at 13:16

## 2023-02-26 RX ADMIN — PREGABALIN 50 MG: 50 CAPSULE ORAL at 15:09

## 2023-02-26 RX ADMIN — METOCLOPRAMIDE HYDROCHLORIDE 5 MG: 5 INJECTION INTRAMUSCULAR; INTRAVENOUS at 18:06

## 2023-02-26 RX ADMIN — CETIRIZINE HYDROCHLORIDE 10 MG: 10 TABLET ORAL at 08:41

## 2023-02-26 RX ADMIN — CYCLOBENZAPRINE HYDROCHLORIDE 5 MG: 5 TABLET, FILM COATED ORAL at 00:13

## 2023-02-26 RX ADMIN — PREGABALIN 50 MG: 50 CAPSULE ORAL at 08:50

## 2023-02-26 RX ADMIN — HYDROCODONE BITARTRATE AND ACETAMINOPHEN 1 TABLET: 7.5; 325 TABLET ORAL at 08:41

## 2023-02-26 RX ADMIN — MIDODRINE HYDROCHLORIDE 2.5 MG: 2.5 TABLET ORAL at 08:41

## 2023-02-26 RX ADMIN — TAMSULOSIN HYDROCHLORIDE 0.4 MG: 0.4 CAPSULE ORAL at 08:41

## 2023-02-26 RX ADMIN — CYCLOBENZAPRINE HYDROCHLORIDE 5 MG: 5 TABLET, FILM COATED ORAL at 13:15

## 2023-02-26 NOTE — NURSING NOTE
" Patient reported at shift change he did not feel well. When asked, patient indicated that his neck, back and head was hurting him. When RN asked when it had started, patient replied \"about noon today.\" RN educated patient that when something like this develops, he must tell nursing staff in order to properly care for him. Patient also educated to inform providers when they visit him as well, so if some of his home medications can be restarted it can be discussed to help provide adequate pain control. Home pain medication was administered.    Patient agreed he would tell nursing staff if he has any discomfort/problems so he can be properly cared for, or providers can be notified for any further assistance.   Patient is also having a hard time with the furniture in room not being comfortable enough for him. He has alternated many times today between the bed and chair. Patient has been noted at times to be asleep, sitting up in the chair, with his head facing downward, chin tucked towards chest. Patient educated this is not a good sleeping posture, and can further strain his neck and cause soreness/aching.  "

## 2023-02-26 NOTE — PROGRESS NOTES
[facility] Medicine Services  INPATIENT PROGRESS NOTE    Patient Name: Erick Luong  Date of Admission: 2/24/2023  Today's Date: 02/26/23  Length of Stay: 1  Primary Care Physician: Del Shetty MD    Subjective   Chief Complaint: abnormal kidney function labs.   HPI     Wants to go home  No physical complaints other than furniture is not comfortable.   No chest pain, shortness of breath, nausea.           Review of Systems   All pertinent negatives and positives are as above. All other systems have been reviewed and are negative unless otherwise stated.     Objective    Temp:  [97.9 °F (36.6 °C)-98.3 °F (36.8 °C)] 98.3 °F (36.8 °C)  Heart Rate:  [54-71] 54  Resp:  [15-18] 15  BP: (124-140)/(56-71) 139/71  Physical Exam  Vitals and nursing note reviewed.   Constitutional:       Appearance: He is obese.   HENT:      Head: Normocephalic and atraumatic.      Right Ear: External ear normal.      Left Ear: External ear normal.      Nose: Nose normal.      Mouth/Throat:      Mouth: Mucous membranes are moist.   Eyes:      Extraocular Movements: Extraocular movements intact.      Conjunctiva/sclera: Conjunctivae normal.      Pupils: Pupils are equal, round, and reactive to light.   Cardiovascular:      Rate and Rhythm: Normal rate and regular rhythm.      Pulses: Normal pulses.      Heart sounds: No murmur heard.    No friction rub. No gallop.   Pulmonary:      Effort: Pulmonary effort is normal.      Breath sounds: Normal breath sounds.   Abdominal:      General: Bowel sounds are normal.      Palpations: Abdomen is soft.   Musculoskeletal:         General: Normal range of motion.      Cervical back: Normal range of motion and neck supple.   Skin:     General: Skin is warm and dry.      Capillary Refill: Capillary refill takes less than 2 seconds.   Neurological:      General: No focal deficit present.      Mental Status: He is alert and oriented to person, place, and time.       Cranial Nerves: No cranial nerve deficit.   Psychiatric:         Mood and Affect: Mood normal.         Behavior: Behavior normal.             Results Review:  I have reviewed the labs, radiology results, and diagnostic studies.    Laboratory Data:   Results from last 7 days   Lab Units 02/26/23  0547 02/24/23 2017 02/22/23  1007   WBC 10*3/mm3 6.47 8.54 7.83   HEMOGLOBIN g/dL 11.5* 11.7* 12.1*   HEMATOCRIT % 36.2* 36.1* 35.8*   PLATELETS 10*3/mm3 191 200 214        Results from last 7 days   Lab Units 02/26/23  0547 02/24/23 2017 02/22/23  1007   SODIUM mmol/L 137 136 140   POTASSIUM mmol/L 4.9 4.9 4.2   CHLORIDE mmol/L 104 96* 101   CO2 mmol/L 23.0 27.0 25.6   BUN mg/dL 49* 55* 68*   CREATININE mg/dL 2.82* 3.22* 3.58*   CALCIUM mg/dL 8.5* 8.8 8.9   BILIRUBIN mg/dL  --  0.4 0.4   ALK PHOS U/L  --  79 72   ALT (SGPT) U/L  --  11 11   AST (SGOT) U/L  --  17 13   GLUCOSE mg/dL 250* 392* 69       Culture Data:   No results found for: BLOODCX, URINECX, WOUNDCX, MRSACX, RESPCX, STOOLCX    Radiology Data:   Imaging Results (Last 24 Hours)     ** No results found for the last 24 hours. **          I have reviewed the patient's current medications.     Assessment/Plan   Assessment  Active Hospital Problems    Diagnosis    • **MAYKEL (acute kidney injury) (HCC)    • Stage 3b chronic kidney disease (HCC)    • Type 2 diabetes mellitus with hyperglycemia, with long-term current use of insulin (HCC)    • Obesity, unspecified obesity severity, unspecified obesity type        Treatment Plan  Continue fluids  Nephrology consult  Labs in AM  [facility]      Medical Decision Making  Number and Complexity of problems:   Maykel, high acuity  CKD, DM II, obesity moderate complexity    Differential Diagnosis: none    Conditions and Status        Condition is improving.     [facility] Data  External documents reviewed: no new  Cardiac tracing (EKG, telemetry) interpretation: none  Radiology interpretation: no new  Labs reviewed: reviewed  Any tests that  were considered but not ordered: none     Decision rules/scores evaluated (example [id number], Wells, etc): none     Discussed with: patient     Care Planning  Shared decision making: patient  Code status and discussions: full    Disposition  Social Determinants of Health that impact treatment or disposition: none  I expect the patient to be discharged to home in 1-2 days.     Electronically signed by Alejandrina Barnett, 02/26/23, 12:17 CST.

## 2023-02-26 NOTE — PROGRESS NOTES
Nephrology ([facility]) Progress Note      Patient:  Erick Luong  YOB: 1955  Date of Service: 2/26/2023  MRN: [id number]   Acct: [id number]   Primary Care Physician: Del Shetty MD  Advance Directive:   Code Status and Medical Interventions:   Ordered at: 02/25/23 0155     Code Status (Patient has no pulse and is not breathing):    CPR (Attempt to Resuscitate)     Medical Interventions (Patient has pulse or is breathing):    Full Support     Admit Date: 2/24/2023       Hospital Day: 1  Referring Provider: Piter Perez MD      Patient personally seen and examined.  Complete chart including Consults, Notes, Operative Reports, Labs, Cardiology, and Radiology studies reviewed as able.    Chief complaint: Abnormal labs.    Subjective:  Erick Luong is a 67 y.o. male  whom we were consulted for acute kidney injury.  He has history of stage IIIb chronic kidney disease baseline, follows me in the office.  His last estimated GFR is 40 mL.  Patient also has history of morbid abdominal obesity, chronic kidney disease, coronary artery disease, hypertension and hyperlipidemia.  Patient was directed to go to hospital by his primary care doctor.  As he was found to have fairly abnormal renal labs.  He was also reporting decreasing blood pressure and blood sugar.  Patient denies any nausea vomiting or diarrhea.  His intake of fluid has been very poor.  He denies any use of nonsteroidal agents.  Patient also denies any shortness of breath or swelling of lower extremities.  Incidental finding of the labs showed serum creatinine was 3.5 mg, baseline creatinine usually 1.8 mg.    This afternoon he feels well.  He denies any shortness of breath and swelling of lower extremities.  His renal function is slowly responding to IV fluid            Allergies:  Cefepime, Bactrim [sulfamethoxazole-trimethoprim], Vancomycin, Zolpidem, Zolpidem tartrate, and Metronidazole    Home  Meds:  Medications Prior to Admission   Medication Sig Dispense Refill Last Dose   • amitriptyline (ELAVIL) 25 MG tablet Take 2 tablets by mouth Daily at bedtime. 60 tablet 1 Past Week   • Aspirin 81 MG capsule Take 81 mg by mouth Daily.   Past Week   • Azelastine HCl 137 MCG/SPRAY solution Use 2 sprays into the nostril(s) as directed by provider 2 (Two) Times a Day. 30 mL 3 Past Week   • bumetanide (BUMEX) 2 MG tablet Take 1 tablet by mouth 2 (Two) Times a Day for 30 days. 60 tablet 0 Past Week   • busPIRone (BUSPAR) 10 MG tablet Take 1 tablet by mouth 3 (Three) Times a Day As Needed. 270 tablet 4 Past Week   • calcitriol (ROCALTROL) 0.5 MCG capsule Take 1 capsule by mouth Daily. 90 capsule 4 Past Week   • carvedilol (COREG) 6.25 MG tablet Take 1 tablet by mouth 2 (Two) Times a Day. 180 tablet 4 Past Week   • Cholecalciferol 125 MCG (5000 UT) tablet Take 1 tablet by mouth Daily with meal 30 tablet 2 Past Week   • cloNIDine (CATAPRES) 0.1 MG tablet Take 0.1 mg by mouth Every 12 (Twelve) Hours.   Past Week   • colchicine 0.6 MG tablet Take 0.6 mg by mouth Daily.   Past Week   • donepezil (ARICEPT) 10 MG tablet Take 1 tablet by mouth Daily. 90 tablet 4 Past Week   • Dulaglutide (Trulicity) 3 MG/0.5ML solution pen-injector Inject 3 mg under the skin into the appropriate area as directed Every 7 (Seven) Days. 4 mL 11 Past Week   • DULoxetine (CYMBALTA) 60 MG capsule Take 1 capsule by mouth Daily. 90 capsule 4 Past Week   • fexofenadine (ALLEGRA) 180 MG tablet Take 1 tablet by mouth Daily. 30 tablet 2 Past Week   • gabapentin (NEURONTIN) 100 MG capsule Take 100 mg by mouth 2 (Two) Times a Day.   Past Week   • insulin aspart (novoLOG FLEXPEN) 100 UNIT/ML solution pen-injector sc pen Inject up to 150 units subcutaneously twice daily according to sliding scale instructions from provider. 90 mL 1 Past Week   • irbesartan (AVAPRO) 150 MG tablet Take 150 mg by mouth Daily.   Past Week   • albuterol sulfate  (90 Base)  MCG/ACT inhaler Inhale 2 puffs Every 4 (Four) Hours As Needed for Wheezing. 8 g 1    • Continuous Blood Gluc  (Dexcom G6 ) device Take As Directed. 1 each 2    • Continuous Blood Gluc Sensor (Dexcom G6 Sensor) Use As Directed. 1 each 2    • Continuous Blood Gluc Transmit (Dexcom G6 Transmitter) misc Use As Directed. 1 each 2    • cyclobenzaprine (FLEXERIL) 5 MG tablet Take 1 tablet by mouth 2 (Two) Times a Day As Needed for muscle spasms 60 tablet 5    • HYDROcodone-acetaminophen (NORCO) 7.5-325 MG per tablet Take 1 tablet by mouth 2 (Two) Times a Day As Needed. 45 tablet 0    • Insulin Pen Needle 31G X 5 MM misc Use three times a day. 100 each 11    • Insulin Regular Human, Conc, (HumuLIN R U-500 KwikPen) 500 UNIT/ML solution pen-injector CONCENTRATED injection Inject 120 Units under the skin into the appropriate area as directed 2 (Two) Times a Day with breakfast and dinner. 18 mL 7    • lactulose (CHRONULAC) 10 GM/15ML solution solution (encephalopathy) Take 30 ml by mouth once daily for 1 month 900 mL 0    • losartan (COZAAR) 100 MG tablet losartan 100 mg tablet      • lubiprostone (Amitiza) 24 MCG capsule Take 1 capsule by mouth 2 (Two) Times a Day--Replaces Trulance 60 capsule 0    • methylcellulose (Citrucel) oral powder Take 5 g twice a day by oral route for 90 days. 900 g 10    • methylcellulose, Laxative, (CITRUCEL) 500 MG tablet tablet 1 tablet Daily.      • metOLazone (ZAROXOLYN) 2.5 MG tablet metolazone 2.5 mg tablet      • midodrine (PROAMATINE) 10 MG tablet midodrine 10 mg tablet      • nebivolol (BYSTOLIC) 5 MG tablet Bystolic 5 mg tablet   TAKE 1 TABLET BY MOUTH EVERY DAY      • ondansetron (Zofran) 4 MG tablet Take 1 tablet by mouth 3 (Three) Times a Day. 15 tablet 1    • pantoprazole (Protonix) 40 MG EC tablet Take 1 tablet by mouth 2 (Two) Times a Day. 180 tablet 4    • Plecanatide (Trulance) 3 MG tablet Take 1 tablet by mouth Daily. 30 tablet 0    • polyethylene glycol  (GaviLyte-G) 236 g solution GaviLyte-G 236 gram-22.74 gram-6.74 gram-5.86 gram oral solution      • polyethylene glycol (MIRALAX) 17 GM/SCOOP powder Take 17 g by mouth 2 (Two) Times a Day. 3060 g 4    • polyethylene glycol-electrolytes (NULYTELY) 420 g solution See Admin Instructions.      • prazosin (MINIPRESS) 1 MG capsule Take 1 capsule by mouth Daily at bedtime. 30 capsule 2    • prazosin (MINIPRESS) 2 MG capsule Take 1 capsule by mouth every night at bedtime. 30 capsule 4    • pregabalin (LYRICA) 50 MG capsule Take 1 capsule by mouth 3 (Three) Times a Day. 90 capsule 2    • rosuvastatin (CRESTOR) 40 MG tablet Take 1 tablet by mouth Every Night. 90 tablet 3    • Sodium Phosphates (fleet enema) 7-19 GM/118ML enema Insert 133 mL by rectal route tonight.  If no BM, repeat in the AM. 133 mL 2    • Sodium Phosphates (Fleets) enema enema 133 mL.      • [] sucralfate (CARAFATE) 1 GM/10ML suspension Take 10 mL by mouth 4 (Four) Times a Day Before Meals & at Bedtime for 30 days. 1200 mL 0    • tamsulosin (Flomax) 0.4 MG capsule 24 hr capsule Take 1 capsule by mouth Daily. 90 capsule 0    • timolol (TIMOPTIC) 0.5 % ophthalmic solution Administer 1 drop to both eyes 2 (Two) Times a Day. 15 mL 3    • traZODone (DESYREL) 100 MG tablet Take 1 tablet by mouth once daily at bedtime 90 tablet 4    • Zinc Sulfate 220 (50 Zn) MG tablet Take 1 tablet by mouth Daily. 30 each 0        Medicines:  Current Facility-Administered Medications   Medication Dose Route Frequency Provider Last Rate Last Admin   • acetaminophen (TYLENOL) tablet 650 mg  650 mg Oral Q4H PRN Abdoulaye Ames MD   650 mg at 23   • albuterol (PROVENTIL) nebulizer solution 0.083% 2.5 mg/3mL  2.5 mg Nebulization Q4H PRN Abdoulaye Ames MD       • amitriptyline (ELAVIL) tablet 50 mg  50 mg Oral Nightly Abdoulaye Ames MD   50 mg at 23 0013   • aspirin chewable tablet 81 mg  81 mg Oral Daily Abdoulaye Ames MD   81 mg at 23  0841   • butalbital-acetaminophen-caffeine (FIORICET, ESGIC) -40 MG per tablet 2 tablet  2 tablet Oral Q4H PRN Abdoulaye Ames MD   2 tablet at 02/26/23 1509   • carvedilol (COREG) tablet 6.25 mg  6.25 mg Oral BID With Meals Abdoulaye Ames MD   6.25 mg at 02/26/23 0841   • cetirizine (zyrTEC) tablet 10 mg  10 mg Oral Daily Abdoulaye Ames MD   10 mg at 02/26/23 0841   • cyclobenzaprine (FLEXERIL) tablet 5 mg  5 mg Oral BID PRN Abdoulaye Ames MD   5 mg at 02/26/23 1315   • dextrose (D50W) (25 g/50 mL) IV injection 25 g  25 g Intravenous Q15 Min PRN Abdoulaye Ames MD       • dextrose (GLUTOSE) oral gel 15 g  15 g Oral Q15 Min PRN Abdoulaye Ames MD       • donepezil (ARICEPT) tablet 10 mg  10 mg Oral Daily Abdoulaye Ames MD   10 mg at 02/26/23 0841   • glucagon (human recombinant) (GLUCAGEN DIAGNOSTIC) injection 1 mg  1 mg Intramuscular Q15 Min PRN Abdoulaye Ames MD       • HYDROcodone-acetaminophen (NORCO) 7.5-325 MG per tablet 1 tablet  1 tablet Oral Q6H PRN Abdoulaye Ames MD   1 tablet at 02/26/23 1509   • Insulin Lispro (humaLOG) injection 2-7 Units  2-7 Units Subcutaneous TID AC Abdoulaye Ames MD   5 Units at 02/26/23 1316   • metoclopramide (REGLAN) injection 10 mg  10 mg Intravenous Q6H Abdoulaye Ames MD   10 mg at 02/26/23 1317   • midodrine (PROAMATINE) tablet 2.5 mg  2.5 mg Oral TID AC Brian Lomas MD   2.5 mg at 02/26/23 1316   • ondansetron (ZOFRAN) injection 4 mg  4 mg Intravenous Q6H PRN Abdoulaye Ames MD   4 mg at 02/25/23 2034   • pregabalin (LYRICA) capsule 50 mg  50 mg Oral TID Abdoulaye Ames MD   50 mg at 02/26/23 1509   • sodium chloride 0.9 % infusion  100 mL/hr Intravenous Continuous Abdoulaye Ames  mL/hr at 02/25/23 2358 100 mL/hr at 02/25/23 2358   • tamsulosin (FLOMAX) 24 hr capsule 0.4 mg  0.4 mg Oral Daily Abdoulaye Ames MD   0.4 mg at 02/26/23 0841   • terazosin (HYTRIN) capsule 5 mg  5 mg Oral Nightly  Abdoulaye Ames MD       • traZODone (DESYREL) tablet 100 mg  100 mg Oral Nightly PRN Abdoulaye Ames MD   100 mg at 02/26/23 0014       Past Medical History:  Past Medical History:   Diagnosis Date   • Arthritis    • Autonomic disease    • CAD (coronary artery disease) 02/06/2017   • Cervical radiculopathy 09/16/2021   • Chronic constipation with acute exaccerbation 05/10/2021   • Coronary artery disease    • Degeneration of cervical intervertebral disc 08/11/2021   • Diabetes mellitus (HCC)    • Diabetic foot ulcer (HCC) 08/31/2020   • Diabetic polyneuropathy associated with type 2 diabetes mellitus (HCC) 01/18/2021   • Elevated cholesterol    • Gastroesophageal reflux disease 05/13/2019   • Headache    • HTN (hypertension), benign 05/03/2017   • Hyperlipidemia    • Hypertension    • Mixed hyperlipidemia 02/07/2017   • Multiple lung nodules 01/26/2020    5mm, 9 mm RLL identified 1/2020, not present 10/2019.   • Myocardial infarction (HCC)    • Osteomyelitis (HCC) 01/22/2020   • Osteomyelitis of fifth toe of right foot (HCC) 10/07/2019   • Pancreatitis    • Persistent insomnia 01/20/2020   • Renal disorder    • Sleep apnea 02/06/2017   • Sleep apnea with use of continuous positive airway pressure (CPAP)     NON-COMPLIANT   • Slow transit constipation 01/16/2019   • Spinal stenosis in cervical region 09/16/2021   • Vitamin D deficiency 03/02/2021       Past Surgical History:  Past Surgical History:   Procedure Laterality Date   • ABDOMINAL SURGERY     • AMPUTATION FOOT / TOE Left 10/2021    5th digit    • ANTERIOR CERVICAL DISCECTOMY W/ FUSION N/A 8/5/2022    Procedure: CERVICAL DISCECTOMY ANTERIOR WITH FUSION C5-6 with possible plating of C5-7 with neuromonitoring and 1 c-arm;  Surgeon: Karel Soliz MD;  Location: [facility] OR;  Service: Neurosurgery;  Laterality: N/A;   • APPENDECTOMY     • BACK SURGERY     • CARDIAC CATHETERIZATION Left 02/08/2021    Procedure: Left Heart Cath w poss intervention left  anatomical snuff box acess;  Surgeon: Omkar Charles DO;  Location[facility] CATH INVASIVE LOCATION;  Service: Cardiology;  Laterality: Left;   • CARDIAC SURGERY     • CATARACT EXTRACTION     • CERVICAL SPINE SURGERY     • COLONOSCOPY N/A 2017    Normal exam repeat in 5 years   • COLONOSCOPY N/A 2019    Mild acute inflammation   • COLONOSCOPY W/ POLYPECTOMY  2014    Hyperplastic polyp   • CORONARY ARTERY BYPASS GRAFT  10/2015   • ENDOSCOPY  2011    Gastritis with hemorrhage   • ENDOSCOPY N/A 2017    Normal exam   • ENDOSCOPY N/A 2019    Gastritis   • ENDOSCOPY N/A 2020    Non-erosive gastritis with hemorrhage   • ENDOSCOPY N/A 02/10/2021    Esophagitis   • FOOT SURGERY Left    • INCISION AND DRAINAGE OF WOUND Left 2007    spider bite       Family History  Family History   Problem Relation Age of Onset   • Colon cancer Father    • Heart disease Father    • Colon cancer Sister    • Colon polyps Sister    • Alzheimer's disease Mother    • Coronary artery disease Sister    • Coronary artery disease Sister        Social History  Social History     Socioeconomic History   • Marital status:    Tobacco Use   • Smoking status: Former     Types: Cigarettes     Quit date:      Years since quittin.1   • Smokeless tobacco: Never   • Tobacco comments:     smoked in highschool   Vaping Use   • Vaping Use: Never used   Substance and Sexual Activity   • Alcohol use: No   • Drug use: No   • Sexual activity: Defer       Review of Systems:  History obtained from chart review and the patient  General ROS: No fever or chills  Respiratory ROS: No cough, shortness of breath, wheezing  Cardiovascular ROS: No chest pain or palpitations  Gastrointestinal ROS: No abdominal pain or melena  Genito-Urinary ROS: No dysuria or hematuria  Psych ROS: No anxiety and depression  14 point ROS reviewed with the patient and negative except as noted above and in the HPI unless unable to  obtain.    Objective:  Patient Vitals for the past 24 hrs:   BP Temp Temp src Pulse Resp SpO2 Weight   02/26/23 1139 139/71 98.3 °F (36.8 °C) Oral 54 15 97 % --   02/26/23 0804 124/64 98 °F (36.7 °C) Oral 59 15 98 % --   02/26/23 0021 140/59 98.1 °F (36.7 °C) Oral 70 16 96 % --   02/25/23 2110 -- -- -- -- -- -- (!) 147 kg (323 lb 9.6 oz)   02/25/23 1914 126/60 97.9 °F (36.6 °C) Oral 71 18 97 % --   02/25/23 1604 129/56 97.9 °F (36.6 °C) Oral 70 16 98 % --       Intake/Output Summary (Last 24 hours) at 2/26/2023 1520  Last data filed at 2/26/2023 0900  Gross per 24 hour   Intake 120 ml   Output 200 ml   Net -80 ml     General: awake/alert    HEENT: Normocephalic atraumatic head  Neck: Supple with no JVD or carotid bruits  Chest:  clear to auscultation bilaterally without respiratory distress  CVS: regular rate and rhythm  Abdominal: soft, nontender, positive bowel sounds  Extremities: no cyanosis or edema  Skin: warm and dry without rash      Labs:  Results from last 7 days   Lab Units 02/26/23  0547 02/24/23 2017 02/22/23  1007   WBC 10*3/mm3 6.47 8.54 7.83   HEMOGLOBIN g/dL 11.5* 11.7* 12.1*   HEMATOCRIT % 36.2* 36.1* 35.8*   PLATELETS 10*3/mm3 191 200 214         Results from last 7 days   Lab Units 02/26/23  0547 02/24/23 2017 02/22/23  1007   SODIUM mmol/L 137 136 140   POTASSIUM mmol/L 4.9 4.9 4.2   CHLORIDE mmol/L 104 96* 101   CO2 mmol/L 23.0 27.0 25.6   BUN mg/dL 49* 55* 68*   CREATININE mg/dL 2.82* 3.22* 3.58*   CALCIUM mg/dL 8.5* 8.8 8.9   EGFR mL/min/1.73 23.9* 20.4* 18.0*   BILIRUBIN mg/dL  --  0.4 0.4   ALK PHOS U/L  --  79 72   ALT (SGPT) U/L  --  11 11   AST (SGOT) U/L  --  17 13   GLUCOSE mg/dL 250* 392* 69       Radiology:   Imaging Results (Last 72 Hours)     Procedure Component Value Units Date/Time    CT Abdomen Pelvis Without Contrast [[id number]] Collected: 02/25/23 0204     Updated: 02/25/23 0221    Narrative:      EXAMINATION: CT ABDOMEN PELVIS WO CONTRAST- 2/25/2023 2:04 AM CST     HISTORY:  omar; N17.9-Acute kidney failure, unspecified; R77.8-Other  specified abnormalities of plasma proteins     DOSE: 1475 mGycm (Automatic exposure control technique was implemented  in an effort to keep the radiation dose as low as possible without  compromising image quality)     REPORT: Spiral CT of the abdomen and pelvis was performed without  contrast from the lung bases through the pubic symphysis. Reconstructed  coronal and sagittal images are also reviewed.     Comparison: CT abdomen pelvis 01/19/2023.     Review of lung windows demonstrates normal aeration of the lung bases.  There is heavy calcified plaque within the coronary arteries. Previous  median sternotomy is noted. Cholecystectomy clips are present. Liver and  spleen are homogeneous, evaluation of the solid abdominal organs is  limited without intravenous contrast. The stomach is decompressed. There  is fatty replacement of the pancreas. The adrenal glands are  unremarkable. There is increased attenuation of perirenal fat planes,  nonspecific. There is a benign-appearing exophytic cyst at the  mid/inferior pole the right kidney, measuring 3.9 cm. This is stable. No  hydronephrosis is identified. There is a small cyst at the inferior pole  the left kidney that measures 1.3 cm. Mild distention of the bladder  without wall thickening. No free fluid or free air is identified. Bowel  loops are normal caliber, moderate stool volume is present. There is  mild fatty infiltration of the inguinal canals. Slightly increased  attenuation of subcutaneous fat planes over the anterior abdominal wall  is unchanged. This may reflect chronic scar tissue. Review of bone  windows shows no acute abnormality. Degenerative changes in the lumbar  spine exacerbate multilevel congenital spinal stenosis appears stable.  Mildly increased attenuation of body wall fat planes, may represent some  degree of generalized volume overload.       Impression:      1. No acute  intra-abdominal or pelvic abnormality is identified.  2. Probable mild constipation, no bowel obstruction.  3. Nonspecific increased attenuation and perinephric fat planes  bilaterally, no evidence urinary tract obstruction. There is a  benign-appearing cyst associated with each kidney.  4. Prior cholecystectomy.  5. Multilevel spinal stenosis, exacerbated by degenerative changes,  stable.        This report was finalized on 02/25/2023 02:18 by Dr. ePrcy Cesar MD.    XR Chest 1 View [[id number]] Collected: 02/24/23 2105     Updated: 02/24/23 2108    Narrative:      EXAMINATION: XR CHEST 1 VW- 2/24/2023 9:05 PM CST     HISTORY: Chest pain.     REPORT: A frontal view of the chest was obtained.     COMPARISON: Chest x-ray 02/02/2023.        The lungs are hypoaerated, no focal infiltrate or pulmonary  consolidation is identified. No pneumothorax or effusion is identified.  Heart size is normal. 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 The osseous  structures show no acute findings. ACDF hardware is present.       Impression:      Shallow inspiration, no acute cardiopulmonary abnormality.     This report was finalized on 02/24/2023 21:05 by Dr. Percy Cesar MD.          Culture:  No results found for: BLOODCX, URINECX, WOUNDCX, MRSACX, RESPCX, STOOLCX      Assessment   1.  Acute kidney injury/improving.  2.  Intravascular volume depletion.  3.  Stage IIIb chronic kidney disease baseline.  4.  Type II diabetic nephropathy.  5.  Morbid abdominal obesity.  6.  Orthostatic hypotension  7.  Secondary hyperparathyroidism.    Plan:  1.  Continue IV fluid  2.  Continue to monitor renal function.  3.  Possible discharge to home tomorrow once creatinine close to 2.0 mg      Brian Lomas MD  2/26/2023  15:20 CST

## 2023-02-26 NOTE — PROGRESS NOTES
"Pharmacy Dosing Service  Automatic Renal Adjustment  Metoclopramide     Assessment/Action/Plan:  Patient w/ WANDER on CKD. Based on prescribing information, metoclopramide 10mg IV Q6h has been adjusted to metoclopramide 5mg IV Q6h. Pharmacy will continue to monitor and make further adjustment(s) accordingly.     Subjective:  Erick Luong is a 67 y.o. male     Objective:  Ht: 182.9 cm (72\"); Wt: (!) 147 kg (323 lb 9.6 oz)  Estimated Creatinine Clearance: 38.3 mL/min (A) (by C-G formula based on SCr of 2.82 mg/dL (H)).     Creatinine   Date Value Ref Range Status   02/26/2023 2.82 (H) 0.76 - 1.27 mg/dL Final   02/24/2023 3.22 (H) 0.76 - 1.27 mg/dL Final   02/22/2023 3.58 (H) 0.76 - 1.27 mg/dL Final   01/03/2022 2.6 (H) 0.5 - 1.2 mg/dL Final   07/21/2021 2.30 (H) 0.60 - 1.30 mg/dL Final     Comment:     Serial Number: [id number]:  [id number]       Reuben Esposito, PharmD  02/26/23 17:12 CST    "

## 2023-02-27 ENCOUNTER — READMISSION MANAGEMENT (OUTPATIENT)
Dept: CALL CENTER | Facility: HOSPITAL | Age: 68
End: 2023-02-27
Payer: COMMERCIAL

## 2023-02-27 VITALS
TEMPERATURE: 97.8 F | HEIGHT: 72 IN | WEIGHT: 315 LBS | SYSTOLIC BLOOD PRESSURE: 125 MMHG | HEART RATE: 60 BPM | BODY MASS INDEX: 42.66 KG/M2 | DIASTOLIC BLOOD PRESSURE: 54 MMHG | OXYGEN SATURATION: 99 % | RESPIRATION RATE: 18 BRPM

## 2023-02-27 LAB
ALBUMIN SERPL-MCNC: 3.9 G/DL (ref 3.5–5.2)
ALBUMIN/GLOB SERPL: 1.3 G/DL
ALP SERPL-CCNC: 80 U/L (ref 39–117)
ALT SERPL W P-5'-P-CCNC: 11 U/L (ref 1–41)
ANION GAP SERPL CALCULATED.3IONS-SCNC: 11 MMOL/L (ref 5–15)
AST SERPL-CCNC: 17 U/L (ref 1–40)
BASOPHILS # BLD AUTO: 0.04 10*3/MM3 (ref 0–0.2)
BASOPHILS NFR BLD AUTO: 0.6 % (ref 0–1.5)
BILIRUB SERPL-MCNC: 0.4 MG/DL (ref 0–1.2)
BUN SERPL-MCNC: 44 MG/DL (ref 8–23)
BUN/CREAT SERPL: 17.2 (ref 7–25)
CALCIUM SPEC-SCNC: 8.4 MG/DL (ref 8.6–10.5)
CHLORIDE SERPL-SCNC: 106 MMOL/L (ref 98–107)
CO2 SERPL-SCNC: 23 MMOL/L (ref 22–29)
CREAT SERPL-MCNC: 2.56 MG/DL (ref 0.76–1.27)
DEPRECATED RDW RBC AUTO: 45.8 FL (ref 37–54)
EGFRCR SERPLBLD CKD-EPI 2021: 26.9 ML/MIN/1.73
EOSINOPHIL # BLD AUTO: 0.54 10*3/MM3 (ref 0–0.4)
EOSINOPHIL NFR BLD AUTO: 7.9 % (ref 0.3–6.2)
ERYTHROCYTE [DISTWIDTH] IN BLOOD BY AUTOMATED COUNT: 14 % (ref 12.3–15.4)
GLOBULIN UR ELPH-MCNC: 3 GM/DL
GLUCOSE BLDC GLUCOMTR-MCNC: 195 MG/DL (ref 70–130)
GLUCOSE BLDC GLUCOMTR-MCNC: 225 MG/DL (ref 70–130)
GLUCOSE BLDC GLUCOMTR-MCNC: 252 MG/DL (ref 70–130)
GLUCOSE SERPL-MCNC: 218 MG/DL (ref 65–99)
HCT VFR BLD AUTO: 37.2 % (ref 37.5–51)
HGB BLD-MCNC: 11.4 G/DL (ref 13–17.7)
IMM GRANULOCYTES # BLD AUTO: 0.02 10*3/MM3 (ref 0–0.05)
IMM GRANULOCYTES NFR BLD AUTO: 0.3 % (ref 0–0.5)
LYMPHOCYTES # BLD AUTO: 1.69 10*3/MM3 (ref 0.7–3.1)
LYMPHOCYTES NFR BLD AUTO: 24.6 % (ref 19.6–45.3)
MCH RBC QN AUTO: 27.5 PG (ref 26.6–33)
MCHC RBC AUTO-ENTMCNC: 30.6 G/DL (ref 31.5–35.7)
MCV RBC AUTO: 89.6 FL (ref 79–97)
MONOCYTES # BLD AUTO: 0.56 10*3/MM3 (ref 0.1–0.9)
MONOCYTES NFR BLD AUTO: 8.2 % (ref 5–12)
NEUTROPHILS NFR BLD AUTO: 4.02 10*3/MM3 (ref 1.7–7)
NEUTROPHILS NFR BLD AUTO: 58.4 % (ref 42.7–76)
NRBC BLD AUTO-RTO: 0 /100 WBC (ref 0–0.2)
PLATELET # BLD AUTO: 186 10*3/MM3 (ref 140–450)
PMV BLD AUTO: 12.1 FL (ref 6–12)
POTASSIUM SERPL-SCNC: 5.1 MMOL/L (ref 3.5–5.2)
PROT SERPL-MCNC: 6.9 G/DL (ref 6–8.5)
RBC # BLD AUTO: 4.15 10*6/MM3 (ref 4.14–5.8)
SODIUM SERPL-SCNC: 140 MMOL/L (ref 136–145)
WBC NRBC COR # BLD: 6.87 10*3/MM3 (ref 3.4–10.8)

## 2023-02-27 PROCEDURE — 80053 COMPREHEN METABOLIC PANEL: CPT | Performed by: FAMILY MEDICINE

## 2023-02-27 PROCEDURE — 82962 GLUCOSE BLOOD TEST: CPT

## 2023-02-27 PROCEDURE — 85025 COMPLETE CBC W/AUTO DIFF WBC: CPT | Performed by: INTERNAL MEDICINE

## 2023-02-27 PROCEDURE — 25010000002 METOCLOPRAMIDE PER 10 MG: Performed by: FAMILY MEDICINE

## 2023-02-27 PROCEDURE — 63710000001 INSULIN LISPRO (HUMAN) PER 5 UNITS: Performed by: INTERNAL MEDICINE

## 2023-02-27 RX ORDER — MIDODRINE HYDROCHLORIDE 2.5 MG/1
2.5 TABLET ORAL
Qty: 90 TABLET | Refills: 2 | Status: SHIPPED | OUTPATIENT
Start: 2023-02-28

## 2023-02-27 RX ORDER — BUMETANIDE 2 MG/1
2 TABLET ORAL DAILY
Qty: 30 TABLET | Refills: 0 | Status: SHIPPED | OUTPATIENT
Start: 2023-03-02 | End: 2023-04-01

## 2023-02-27 RX ORDER — TERAZOSIN 5 MG/1
5 CAPSULE ORAL NIGHTLY
Qty: 30 CAPSULE | Refills: 2 | Status: SHIPPED | OUTPATIENT
Start: 2023-02-27

## 2023-02-27 RX ADMIN — MIDODRINE HYDROCHLORIDE 2.5 MG: 2.5 TABLET ORAL at 08:36

## 2023-02-27 RX ADMIN — CETIRIZINE HYDROCHLORIDE 10 MG: 10 TABLET ORAL at 08:38

## 2023-02-27 RX ADMIN — SODIUM CHLORIDE 100 ML/HR: 9 INJECTION, SOLUTION INTRAVENOUS at 08:38

## 2023-02-27 RX ADMIN — CARVEDILOL 6.25 MG: 6.25 TABLET, FILM COATED ORAL at 08:37

## 2023-02-27 RX ADMIN — HYDROCODONE BITARTRATE AND ACETAMINOPHEN 1 TABLET: 7.5; 325 TABLET ORAL at 08:37

## 2023-02-27 RX ADMIN — MIDODRINE HYDROCHLORIDE 2.5 MG: 2.5 TABLET ORAL at 17:25

## 2023-02-27 RX ADMIN — DONEPEZIL HYDROCHLORIDE 10 MG: 10 TABLET ORAL at 08:37

## 2023-02-27 RX ADMIN — METOCLOPRAMIDE HYDROCHLORIDE 5 MG: 5 INJECTION INTRAMUSCULAR; INTRAVENOUS at 00:20

## 2023-02-27 RX ADMIN — PREGABALIN 50 MG: 50 CAPSULE ORAL at 17:25

## 2023-02-27 RX ADMIN — MIDODRINE HYDROCHLORIDE 2.5 MG: 2.5 TABLET ORAL at 13:26

## 2023-02-27 RX ADMIN — ASPIRIN 81 MG: 81 TABLET, CHEWABLE ORAL at 08:37

## 2023-02-27 RX ADMIN — BUTALBITAL, ACETAMINOPHEN, AND CAFFEINE 2 TABLET: 50; 325; 40 TABLET ORAL at 08:37

## 2023-02-27 RX ADMIN — TAMSULOSIN HYDROCHLORIDE 0.4 MG: 0.4 CAPSULE ORAL at 08:36

## 2023-02-27 RX ADMIN — INSULIN LISPRO 3 UNITS: 100 INJECTION, SOLUTION INTRAVENOUS; SUBCUTANEOUS at 08:38

## 2023-02-27 RX ADMIN — INSULIN LISPRO 4 UNITS: 100 INJECTION, SOLUTION INTRAVENOUS; SUBCUTANEOUS at 13:26

## 2023-02-27 RX ADMIN — PREGABALIN 50 MG: 50 CAPSULE ORAL at 08:37

## 2023-02-27 RX ADMIN — INSULIN LISPRO 2 UNITS: 100 INJECTION, SOLUTION INTRAVENOUS; SUBCUTANEOUS at 17:25

## 2023-02-27 RX ADMIN — CARVEDILOL 6.25 MG: 6.25 TABLET, FILM COATED ORAL at 17:25

## 2023-02-27 RX ADMIN — METOCLOPRAMIDE HYDROCHLORIDE 5 MG: 5 INJECTION INTRAMUSCULAR; INTRAVENOUS at 06:08

## 2023-02-27 NOTE — PAYOR COMM NOTE
"REF:   [id number]    [facility]  IVAN,  [phone]  OR  FAX   [phone]    Erick Luong (67 y.o. Male)     Date of Birth   1955    Social Security Number       Address   [address] [place] [address]    Home Phone   [phone]    MRN   [id number]       Restoration   [facility] [place]    Marital Status                               Admission Date   2/24/23    Admission Type   Emergency    Admitting Provider   Payam Keyes DO    Attending Provider   Payam Keyes DO    Department, Room/Bed   [facility] 4B, 444/1       Discharge Date       Discharge Disposition       Discharge Destination                               Attending Provider: Payam Keyes DO    Allergies: Cefepime, Bactrim [Sulfamethoxazole-trimethoprim], Vancomycin, Zolpidem, Zolpidem Tartrate, Metronidazole    Isolation: None   Infection: MRSA (05/19/19), COVID (History) (08/08/22)   Code Status: CPR    Ht: 182.9 cm (72\")   Wt: 149 kg (328 lb 12.8 oz)    Admission Cmt: None   Principal Problem: WANDER (acute kidney injury) (HCC) [N17.9]                 Active Insurance as of 2/24/2023     Primary Coverage     Payor Plan Insurance Group Employer/Plan Group    [employer] [facility] EMPLOYEE [id number]     Payor Plan Address Payor Plan Phone Number Payor Plan Fax Number Effective Dates    [address] [phone]  1/1/2022 - None Entered    [facility] [id number]       Subscriber Name Subscriber Birth Date Member ID       ZAINAB LUONG 11/27/1969 [id number]           Secondary Coverage     Payor Plan Insurance Group Employer/Plan Group    MEDICARE MEDICARE A & B      Payor Plan Address Payor Plan Phone Number Payor Plan Fax Number Effective Dates    [address] [phone]  7/1/2013 - None Entered    [facility] [id number]       Subscriber Name Subscriber Birth Date Member ID       ERICK LUONG 1955 [id number]                 Emergency Contacts      (Rel.) " "Home Phone Work Phone Mobile Phone    Joan Luong (Spouse) [phone]        Patient Care Timeline (2023 18:01 to 2023 02:02)    2023 Event Details User   18:01 Patient arrival  Ailyn Thomas RN   18:25 Vital Signs  Vital Signs  Temp: 97.1 °F (36.2 °C)  Temp src: Oral  Heart Rate: 91  Heart Rate Source: Monitor  Resp: 18  Resp Rate Source: Visual  BP: 150/66  BP Location: Right arm  BP Method: Automatic  Patient Position: Sitting  BMI (Calculated): 44.2  Oxygen Therapy  SpO2: 99 %  Pulse Oximetry Type: Intermittent  Device (Oxygen Therapy): room air  Vitals Timer  Restart Vitals Timer: Yes  Height and Weight  Height: 182.9 cm (72\")  Weight: 148 kg (326 lb) Abnormal   Weight Method: Standing scale  Other flowsheet entries  Ideal Body Weight k.6 Ailyn Thomas RN   18:25:22 Trigger for Triage Start  Ailyn Thomas RN   18:25:22 Triage Started  Ailyn Thomas RN   18:25:22 Chief Complaints Updated Abnormal Lab   Ailyn Thomas RN     18:27 HPI HPI (Adult)  Stated Reason for Visit: pt states he is having fluctuating BP and glucose levels with no known cause. Pt sent by Dr. Shetty due to abnormal labwork.  Precipitating Event(s): unknown Ailyn Thomas RN     19:32:15 ED Notes Pt was sent to the ED by his PCP d/t abnormal labs. Dr. Lomas is aware of pt and says pt needs to be admitted. Pt is awake and alert. Pt denies any pain, incident or injury. Pt does not have any other complaints or concerns at this time. No obvious DCAPBTLS noted. PMSx4.  Billy Carvajal, ELVIS     21:14 Free Text Creatinine elevated 3.2, increased from baseline over the last few weeks around 2.5.  Glucose elevated at 392.  Anion gap 13.  Hemoglobin low at 11.7.  High-sensitivity troponin elevated at 52.  Lipase elevated at 77.  Lactic normal.  BNP normal. Matilda Darden MD   21:14 Free Text EKG interpreted by me: Normal sinus rhythm rate of 69.  LBBB, not meeting Sgarbossa's criteria. "  Left axis deviation. Matilda Darden MD     21:31 Medication New Bag lactated ringers bolus 500 mL - Dose: 500 mL ; Rate: 500 mL/hr ; Route: Intravenous ; Line: Peripheral IV 02/24/23 2012 Right Antecubital ; Scheduled Time: 2119 Billy Carvajal RN     21:23:25 Urinalysis, Microscopic Only - Urine, Clean Catch Resulted Abnormal Result   Collected: 2/24/2023 21:09   Last updated: 2/24/2023 21:23   Status: Final result   RBC, UA: 0-2 /HPF Abnormal  [Ref Range: None Seen]   WBC, UA: None Seen /HPF [Ref Range: None Seen]   Bacteria, UA: None Seen /HPF [Ref Range: None Seen]   Squamous Epithelial Cells, UA: 0-2 /HPF [Ref Range: None Seen, 0-2]   Hyaline Casts, UA: 0-2 /LPF [Ref Range: None Seen]   Methodology: Automated Microscopy  Lab, Background User   21:31 Medication New Bag lactated ringers bolus 500 mL - Dose: 500 mL ; Rate: 500 mL/hr ; Route: Intravenous ; Line: Peripheral IV 02/24/23 2012 Right Antecubital ; Scheduled Time: 2119 Billy Carvaajl RN   21:31 Sepsis Predictive Model Early Detection of Sepsis PA score  Early Detection of Sepsis PA score: 1.28 Inpatient, Batch Job   21:34 Pain Medication Administered - Adult Given - aspirin chewable tablet 324 mg Billy Carvajal RN   21:34 Medication Given aspirin chewable tablet 324 mg - Dose: 324 mg ; Route: Oral ; Scheduled Time: 2057 Billy Carvajal RN Goodwin, Craig, RN   Registered Nurse  Plan of Care     Signed  Date of Service:  02/25/23 0413  Creation Time:  02/25/23 0413     Signed          Goal Outcome Evaluation:  Outcome Evaluation: Pt admitted through the ED for an WANDER. Pt c/o chronic neck pain, but fell asleep before norco could be administered. VSS. Pt NPO for nephrology consult. IVF infused as ordered. Sinus 71-75. SpO2  on room air.              Sonal Snider, RN   Registered Nurse  Cardiology  Plan of Care     Signed  Date of Service:  02/25/23 1802  Creation Time:  02/25/23 1802     Signed          Goal Outcome Evaluation:  Plan of  "Care Reviewed With: patient  Progress: no change  Outcome Evaluation: NPO until seen by Nephrology today. Patient needs to void for urine samples and testing, clean urinal in room and patient aware. Wife informed nursing staff that patient has had trouble with glucose spiking and falling randomly without cause. Patient seems sad/pessimistic this afternoon. Experienced a small nose bleed today, that patient and wife reports happens when BP is very high, but when checked the BP was WNL. Midodrine orderd to start with patient this admission.              Sonal Snider RN   Registered Nurse  Cardiology  Nursing Note     Signed  Date of Service:  02/25/23 2698  Creation Time:  02/26/23 0775     Signed                      Patient reported at shift change he did not feel well. When asked, patient indicated that his neck, back and head was hurting him. When RN asked when it had started, patient replied \"about noon today.\" RN educated patient that when something like this develops, he must tell nursing staff in order to properly care for him. Patient also educated to inform providers when they visit him as well, so if some of his home medications can be restarted it can be discussed to help provide adequate pain control. Home pain medication was administered.               Patient agreed he would tell nursing staff if he has any discomfort/problems so he can be properly cared for, or providers can be notified for any further assistance.              Patient is also having a hard time with the furniture in room not being comfortable enough for him. He has alternated many times today between the bed and chair. Patient has been noted at times to be asleep, sitting up in the chair, with his head facing downward, chin tucked towards chest. Patient educated this is not a good sleeping posture, and can further strain his neck and cause soreness/aching.              Sonal Snider, RN   Registered " Nurse  Cardiology  Plan of Care     Signed  Date of Service:  02/26/23 1828  Creation Time:  02/26/23 1828     Signed          Goal Outcome Evaluation:  Plan of Care Reviewed With: patient  Progress: improving  Outcome Evaluation: kidney function is improving per Nephrologist. Possible DC tomorrow. Patient and wife believe glucose medication that is being managed by Dr. Gifford is not appropriatet. RN informed them that they need to notify the physician in charge of this and discuss treatment options.              Lico Perez RN   Registered Nurse  Plan of Care     Signed  Date of Service:  02/27/23 0541  Creation Time:  02/27/23 0541     Signed          Goal Outcome Evaluation:  Plan of Care Reviewed With: patient  Progress: improving  Outcome Evaluation: VSS. C/o pain x 1, medicated with PRN pain meds with relief noted. IVF continue to infuse. BUN 44 CREAT 2.56 this AM. NSR/SB on tele. Will continue to monitor.                         History & Physical      Abdoulaye Ames MD at 02/25/23 0151              [facility]  HISTORY AND PHYSICAL    Date of Admission: 2/24/2023  Primary Care Physician: Del Shetty MD    Subjective   Primary Historian: Patient    Chief Complaint: Abnormal lab    History of Present Illness  Patient is a 67-year-old male with medical history of obesity, CKD, CAD, hypertension and diabetes, presenting with complaints of abnormal labs.  Patient reports he was called by his PCP about his abnormal renal function and was recommended to present to the emergency room for evaluation.  Patient reports no changes in his urinary habits.  Reports no chest pain, shortness of breath or any other issues.      Review of Systems   Otherwise complete ROS reviewed and negative except as mentioned in the HPI.    Past Medical History:   Past Medical History:   Diagnosis Date   • Arthritis    • Autonomic disease    • CAD (coronary artery disease)  02/06/2017   • Cervical radiculopathy 09/16/2021   • Chronic constipation with acute exaccerbation 05/10/2021   • Coronary artery disease    • Degeneration of cervical intervertebral disc 08/11/2021   • Diabetes mellitus (HCC)    • Diabetic foot ulcer (HCC) 08/31/2020   • Diabetic polyneuropathy associated with type 2 diabetes mellitus (HCC) 01/18/2021   • Elevated cholesterol    • Gastroesophageal reflux disease 05/13/2019   • Headache    • HTN (hypertension), benign 05/03/2017   • Hyperlipidemia    • Hypertension    • Mixed hyperlipidemia 02/07/2017   • Multiple lung nodules 01/26/2020    5mm, 9 mm RLL identified 1/2020, not present 10/2019.   • Myocardial infarction (HCC)    • Osteomyelitis (HCC) 01/22/2020   • Osteomyelitis of fifth toe of right foot ([facility]) 10/07/2019   • Pancreatitis    • Persistent insomnia 01/20/2020   • Renal disorder    • Sleep apnea 02/06/2017   • Sleep apnea with use of continuous positive airway pressure (CPAP)     NON-COMPLIANT   • Slow transit constipation 01/16/2019   • Spinal stenosis in cervical region 09/16/2021   • Vitamin D deficiency 03/02/2021     Past Surgical History:  Past Surgical History:   Procedure Laterality Date   • ABDOMINAL SURGERY     • AMPUTATION FOOT / TOE Left 10/2021    5th digit    • ANTERIOR CERVICAL DISCECTOMY W/ FUSION N/A 8/5/2022    Procedure: CERVICAL DISCECTOMY ANTERIOR WITH FUSION C5-6 with possible plating of C5-7 with neuromonitoring and 1 c-arm;  Surgeon: Karel Soliz MD;  Location: [facility] OR;  Service: Neurosurgery;  Laterality: N/A;   • APPENDECTOMY     • BACK SURGERY     • CARDIAC CATHETERIZATION Left 02/08/2021    Procedure: Left Heart Cath w poss intervention left anatomical snuff box acess;  Surgeon: Omkar Charles DO;  Location:  [facility] CATH INVASIVE LOCATION;  Service: Cardiology;  Laterality: Left;   • CARDIAC SURGERY     • CATARACT EXTRACTION     • CERVICAL SPINE SURGERY     • COLONOSCOPY N/A 01/31/2017    Normal exam repeat  "in 5 years   • COLONOSCOPY N/A 02/11/2019    Mild acute inflammation   • COLONOSCOPY W/ POLYPECTOMY  03/04/2014    Hyperplastic polyp   • CORONARY ARTERY BYPASS GRAFT  10/2015   • ENDOSCOPY  04/13/2011    Gastritis with hemorrhage   • ENDOSCOPY N/A 05/05/2017    Normal exam   • ENDOSCOPY N/A 02/11/2019    Gastritis   • ENDOSCOPY N/A 09/01/2020    Non-erosive gastritis with hemorrhage   • ENDOSCOPY N/A 02/10/2021    Esophagitis   • FOOT SURGERY Left    • INCISION AND DRAINAGE OF WOUND Left 09/2007    spider bite     Social History:  reports that he quit smoking about 32 years ago. His smoking use included cigarettes. He has never used smokeless tobacco. He reports that he does not drink alcohol and does not use drugs.    Family History: family history includes Alzheimer's disease in his mother; Colon cancer in his father and sister; Colon polyps in his sister; Coronary artery disease in his sister and sister; Heart disease in his father.       Allergies:  Allergies   Allergen Reactions   • Cefepime Hives and Anaphylaxis   • Bactrim [Sulfamethoxazole-Trimethoprim] Other (See Comments)     \"RENAL FAILURE\"   • Vancomycin Itching   • Zolpidem Unknown - High Severity     \"makes him crazy\"   • Zolpidem Tartrate Unknown - Low Severity and Provider Review Needed   • Metronidazole Rash       Medications:  Prior to Admission medications    Medication Sig Start Date End Date Taking? Authorizing Provider   albuterol sulfate  (90 Base) MCG/ACT inhaler Inhale 2 puffs Every 4 (Four) Hours As Needed for Wheezing. 1/26/23   Willy Rollins MD   amitriptyline (ELAVIL) 25 MG tablet Take 2 tablets by mouth Daily at bedtime. 2/16/23      Aspirin 81 MG capsule Take 81 mg by mouth Daily.    Provider, MD Bossman   Azelastine HCl 137 MCG/SPRAY solution Use 2 sprays into the nostril(s) as directed by provider 2 (Two) Times a Day. 2/16/23      bumetanide (BUMEX) 2 MG tablet Take 1 tablet by mouth 2 (Two) Times a Day for 30 " days. 1/26/23 2/25/23  Lexie Houston APRN   busPIRone (BUSPAR) 10 MG tablet Take 1 tablet by mouth 3 (Three) Times a Day As Needed. 2/9/23      calcitriol (ROCALTROL) 0.5 MCG capsule Take 1 capsule by mouth Daily. 2/23/23      carvedilol (COREG) 6.25 MG tablet Take 1 tablet by mouth 2 (Two) Times a Day. 12/22/22      Cholecalciferol 125 MCG (5000 UT) tablet Take 1 tablet by mouth Daily with meal 5/27/22      cloNIDine (CATAPRES) 0.1 MG tablet Every 12 (Twelve) Hours.    ProviderBossman MD   colchicine 0.6 MG tablet 1 tablet Orally    ProviderBossman MD   Continuous Blood Gluc  (Dexcom G6 ) device Take As Directed. 2/16/23      Continuous Blood Gluc Sensor (Dexcom G6 Sensor) Use As Directed. 2/16/23      Continuous Blood Gluc Transmit (Dexcom G6 Transmitter) misc Use As Directed. 2/16/23      cyclobenzaprine (FLEXERIL) 5 MG tablet Take 1 tablet by mouth 2 (Two) Times a Day As Needed for muscle spasms 1/28/22      donepezil (ARICEPT) 10 MG tablet Take 1 tablet by mouth Daily. 7/20/22      doxycycline (VIBRAMYCIN) 100 MG capsule doxycycline hyclate 100 mg capsule    ProviderBossman MD   Dulaglutide (Trulicity) 3 MG/0.5ML solution pen-injector Inject 3 mg under the skin into the appropriate area as directed Every 7 (Seven) Days. 10/19/22      DULoxetine (CYMBALTA) 60 MG capsule Take 1 capsule by mouth Daily. 2/6/23      fexofenadine (ALLEGRA) 180 MG tablet Take 1 tablet by mouth Daily. 2/16/23      gabapentin (NEURONTIN) 100 MG capsule gabapentin 100 mg capsule   TAKE  1 CAPSULE BY MOUTH TWICE A DAY.    ProviderBossman MD   HYDROcodone-acetaminophen (NORCO) 7.5-325 MG per tablet Take 1 tablet by mouth 2 (Two) Times a Day As Needed. 2/15/23      insulin aspart (novoLOG FLEXPEN) 100 UNIT/ML solution pen-injector sc pen Inject up to 150 units subcutaneously twice daily according to sliding scale instructions from provider. 5/27/22      Insulin Pen Needle 31G X 5 MM misc Use three  times a day. 11/2/21      Insulin Regular Human, Conc, (HumuLIN R U-500 KwikPen) 500 UNIT/ML solution pen-injector CONCENTRATED injection Inject 120 Units under the skin into the appropriate area as directed 2 (Two) Times a Day with breakfast and dinner. 9/2/22      irbesartan (AVAPRO) 300 MG tablet Daily.    Bossman Blount MD   lactulose (CHRONULAC) 10 GM/15ML solution solution (encephalopathy) Take 30 ml by mouth once daily for 1 month 2/3/23      losartan (COZAAR) 100 MG tablet losartan 100 mg tablet    Bossman Blount MD   lubiprostone (Amitiza) 24 MCG capsule Take 1 capsule by mouth 2 (Two) Times a Day--Replaces Trulance 2/2/23      methylcellulose (Citrucel) oral powder Take 5 g twice a day by oral route for 90 days. 2/9/23      methylcellulose, Laxative, (CITRUCEL) 500 MG tablet tablet 1 tablet Daily.    Bossman Blount MD   metOLazone (ZAROXOLYN) 2.5 MG tablet metolazone 2.5 mg tablet    Bossman Blount MD   midodrine (PROAMATINE) 10 MG tablet midodrine 10 mg tablet    Bossman Blount MD   nebivolol (BYSTOLIC) 5 MG tablet Bystolic 5 mg tablet   TAKE 1 TABLET BY MOUTH EVERY DAY    Bossman Blount MD   ondansetron (Zofran) 4 MG tablet Take 1 tablet by mouth 3 (Three) Times a Day. 12/27/22      pantoprazole (Protonix) 40 MG EC tablet Take 1 tablet by mouth 2 (Two) Times a Day. 11/8/22      Plecanatide (Trulance) 3 MG tablet Take 1 tablet by mouth Daily. 2/2/23      polyethylene glycol (GaviLyte-G) 236 g solution GaviLyte-G 236 gram-22.74 gram-6.74 gram-5.86 gram oral solution    Bossman Blount MD   polyethylene glycol (MIRALAX) 17 GM/SCOOP powder Take 17 g by mouth 2 (Two) Times a Day. 2/9/23      polyethylene glycol-electrolytes (NULYTELY) 420 g solution See Admin Instructions. 2/2/23   Bossman Blount MD   prazosin (MINIPRESS) 1 MG capsule Take 1 capsule by mouth Daily at bedtime. 2/16/23      prazosin (MINIPRESS) 2 MG capsule Take 1 capsule by mouth every  "night at bedtime. 1/4/23      pregabalin (LYRICA) 50 MG capsule Take 1 capsule by mouth 3 (Three) Times a Day. 1/4/23      rosuvastatin (CRESTOR) 40 MG tablet Take 1 tablet by mouth Every Night. 3/18/22      Sodium Phosphates (fleet enema) 7-19 GM/118ML enema Insert 133 mL by rectal route tonight.  If no BM, repeat in the AM. 2/2/23      Sodium Phosphates (Fleets) enema enema 133 mL.    Provider, MD Bossman   sucralfate (CARAFATE) 1 GM/10ML suspension Take 10 mL by mouth 4 (Four) Times a Day Before Meals & at Bedtime for 30 days. 1/26/23 2/25/23  Lexei Houston APRN   tamsulosin (Flomax) 0.4 MG capsule 24 hr capsule Take 1 capsule by mouth Daily. 11/8/22      timolol (TIMOPTIC) 0.5 % ophthalmic solution Administer 1 drop to both eyes 2 (Two) Times a Day. 1/17/23      traZODone (DESYREL) 100 MG tablet Take 1 tablet by mouth once daily at bedtime 2/9/23      Zinc Sulfate 220 (50 Zn) MG tablet Take 1 tablet by mouth Daily. 5/27/22      spironolactone (ALDACTONE) 25 MG tablet Take 1 tablet by mouth Daily. 9/28/20 12/31/20  Del Shetty MD     I have utilized all available immediate resources to obtain, update, or review the patient's current medications (including all prescriptions, over-the-counter products, herbals, cannabis/cannabidiol products, and vitamin/mineral/dietary (nutritional) supplements).    Objective     Vital Signs: /75   Pulse 73   Temp 97.1 °F (36.2 °C) (Oral)   Resp 18   Ht 182.9 cm (72\")   Wt (!) 148 kg (326 lb)   SpO2 97%   BMI 44.21 kg/m²   Physical Exam:   Temp:  [97.1 °F (36.2 °C)-98.1 °F (36.7 °C)] 98.1 °F (36.7 °C)  Heart Rate:  [65-91] 77  Resp:  [18] 18  BP: (121-150)/(52-77) 138/61  Physical Exam  General: Morbid obesity, NC/AT, appears stated age, alert and oriented x3  HEENT: PERRLA, EOMI, no scleral icterus, no conjunctival injection,   NECK:  Neck is supple, no JVD, no supraclavicular lymphadenopathy  CV: S1 S2 RRR, no murmurs, no gallops, no heaves, pulses " palpable.  LUNG: CTA, no wheezing crackles or rhonchi  ABD: Nondistended, nontender, bowel sounds present, no guarding or rebound, organomegaly not appreciated.   EXT: FROM, strength is intact, no pitting edema, no joint effusion, no calf tenderness.  Pulses palpable  Neuro: CN 2-12, grossly intact,no  focal weakness appreciated  Skin: Warm and dry, no rash or lesions.      Results Reviewed:  Lab Results (last 24 hours)     Procedure Component Value Units Date/Time    Single High Sensitivity Troponin T [[id number]]  (Abnormal) Collected: 02/24/23 2348    Specimen: Blood Updated: 02/25/23 0013     HS Troponin T 44 ng/L     Narrative:      High Sensitive Troponin T Reference Range:  <10.0 ng/L- Negative Female for AMI  <15.0 ng/L- Negative Male for AMI  >=10 - Abnormal Female indicating possible myocardial injury.  >=15 - Abnormal Male indicating possible myocardial injury.   Clinicians would have to utilize clinical acumen, EKG, Troponin, and serial changes to determine if it is an Acute Myocardial Infarction or myocardial injury due to an underlying chronic condition.         Urinalysis With Microscopic If Indicated (No Culture) - Urine, Clean Catch [[id number]]  (Abnormal) Collected: 02/24/23 2109    Specimen: Urine, Clean Catch Updated: 02/24/23 2123     Color, UA Yellow     Appearance, UA Clear     pH, UA 6.0     Specific Gravity, UA 1.020     Glucose, UA >=1000 mg/dL (3+)     Ketones, UA Negative     Bilirubin, UA Negative     Blood, UA Negative     Protein,  mg/dL (2+)     Leuk Esterase, UA Negative     Nitrite, UA Negative     Urobilinogen, UA 1.0 E.U./dL    Urinalysis, Microscopic Only - Urine, Clean Catch [[id number]]  (Abnormal) Collected: 02/24/23 2109    Specimen: Urine, Clean Catch Updated: 02/24/23 2123     RBC, UA 0-2 /HPF      WBC, UA None Seen /HPF      Bacteria, UA None Seen /HPF      Squamous Epithelial Cells, UA 0-2 /HPF      Hyaline Casts, UA 0-2 /LPF      Methodology Automated Microscopy     Comprehensive Metabolic Panel [[id number]]  (Abnormal) Collected: 02/24/23 2017    Specimen: Blood Updated: 02/24/23 2048     Glucose 392 mg/dL      BUN 55 mg/dL      Creatinine 3.22 mg/dL      Sodium 136 mmol/L      Potassium 4.9 mmol/L      Chloride 96 mmol/L      CO2 27.0 mmol/L      Calcium 8.8 mg/dL      Total Protein 6.6 g/dL      Albumin 4.1 g/dL      ALT (SGPT) 11 U/L      AST (SGOT) 17 U/L      Alkaline Phosphatase 79 U/L      Total Bilirubin 0.4 mg/dL      Globulin 2.5 gm/dL      A/G Ratio 1.6 g/dL      BUN/Creatinine Ratio 17.1     Anion Gap 13.0 mmol/L      eGFR 20.4 mL/min/1.73     Narrative:      GFR Normal >60  Chronic Kidney Disease <60  Kidney Failure <15      Lactic Acid, Plasma [[id number]]  (Normal) Collected: 02/24/23 2017    Specimen: Blood Updated: 02/24/23 2047     Lactate 1.6 mmol/L     BNP [[id number]]  (Normal) Collected: 02/24/23 2017    Specimen: Blood Updated: 02/24/23 2043     proBNP 592.3 pg/mL     Narrative:      Among patients with dyspnea, NT-proBNP is highly sensitive for the detection of acute congestive heart failure. In addition NT-proBNP of <300 pg/ml effectively rules out acute congestive heart failure with 99% negative predictive value.    Results may be falsely decreased if patient taking Biotin.      Lipase [[id number]]  (Abnormal) Collected: 02/24/23 2017    Specimen: Blood Updated: 02/24/23 2043     Lipase 77 U/L     Single High Sensitivity Troponin T [[id number]]  (Abnormal) Collected: 02/24/23 2017    Specimen: Blood Updated: 02/24/23 2042     HS Troponin T 52 ng/L     Narrative:      High Sensitive Troponin T Reference Range:  <10.0 ng/L- Negative Female for AMI  <15.0 ng/L- Negative Male for AMI  >=10 - Abnormal Female indicating possible myocardial injury.  >=15 - Abnormal Male indicating possible myocardial injury.   Clinicians would have to utilize clinical acumen, EKG, Troponin, and serial changes to determine if it is an Acute Myocardial Infarction or myocardial  injury due to an underlying chronic condition.         aPTT [[id number]]  (Normal) Collected: 02/24/23 2017    Specimen: Blood Updated: 02/24/23 2035     PTT 27.6 seconds     Protime-INR [[id number]]  (Normal) Collected: 02/24/23 2017    Specimen: Blood Updated: 02/24/23 2035     Protime 13.6 Seconds      INR 1.03    CBC & Differential [[id number]]  (Abnormal) Collected: 02/24/23 2017    Specimen: Blood Updated: 02/24/23 2026    Narrative:      The following orders were created for panel order CBC & Differential.  Procedure                               Abnormality         Status                     ---------                               -----------         ------                     CBC Auto Differential[[id number]]        Abnormal            Final result                 Please view results for these tests on the individual orders.    CBC Auto Differential [[id number]]  (Abnormal) Collected: 02/24/23 2017    Specimen: Blood Updated: 02/24/23 2026     WBC 8.54 10*3/mm3      RBC 4.14 10*6/mm3      Hemoglobin 11.7 g/dL      Hematocrit 36.1 %      MCV 87.2 fL      MCH 28.3 pg      MCHC 32.4 g/dL      RDW 13.7 %      RDW-SD 43.6 fl      MPV 11.7 fL      Platelets 200 10*3/mm3      Neutrophil % 63.2 %      Lymphocyte % 21.4 %      Monocyte % 9.3 %      Eosinophil % 5.2 %      Basophil % 0.5 %      Immature Grans % 0.4 %      Neutrophils, Absolute 5.41 10*3/mm3      Lymphocytes, Absolute 1.83 10*3/mm3      Monocytes, Absolute 0.79 10*3/mm3      Eosinophils, Absolute 0.44 10*3/mm3      Basophils, Absolute 0.04 10*3/mm3      Immature Grans, Absolute 0.03 10*3/mm3      nRBC 0.0 /100 WBC         Imaging Results (Last 24 Hours)     Procedure Component Value Units Date/Time    CT Abdomen Pelvis Without Contrast [[id number]] Resulted: 02/24/23 2230     Updated: 02/24/23 2233    XR Chest 1 View [[id number]] Collected: 02/24/23 2105     Updated: 02/24/23 2108    Narrative:      EXAMINATION: XR CHEST 1 VW- 2/24/2023 9:05 PM CST      HISTORY: Chest pain.     REPORT: A frontal view of the chest was obtained.     COMPARISON: Chest x-ray 02/02/2023.        The lungs are hypoaerated, no focal infiltrate or pulmonary  consolidation is identified. No pneumothorax or effusion is identified.  Heart size is normal. 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 The osseous  structures show no acute findings. ACDF hardware is present.       Impression:      Shallow inspiration, no acute cardiopulmonary abnormality.     This report was finalized on 02/24/2023 21:05 by Dr. Percy Cesar MD.        I have personally reviewed and interpreted the radiology studies and ECG obtained at time of admission.     Assessment / Plan   Assessment and plan:   67-year-old male with medical history of diabetes, hypertension, morbid obesity, and CKD stage III, presenting with complaints of having abnormal lab with worsening renal function. Patient takes Bumex at home and was instructed by his doctor to stop his Bumex as of today. He is admitted for further medical eval and management.      Problem list:  Active Hospital Problems    Diagnosis    • **WANDER (acute kidney injury) (HCC)    Acute on CKD  Morbid obesity   Diabetes, insulin-dependent        Treatment Plan  The patient will be admitted to my service here at [facility].  -Admit observation  -IV gentle hydration  - Continue holding Bumex  - Hold ACE inhibitor's  - I reviewed the rest of his home medications, restart those appropriate for management of his other comorbid condition  - Consult nephrology for eval recommendation        DVT and GI prophylaxis covered   Patient is a full code  Anticipated length of stay less than 2 nights  Time spent evaluation greater than 30 minutes.      Medical Decision Making  As discussed above      Conditions and Status  Patient is clinically stable     MDM Data  External documents reviewed: As discussed above  Cardiac tracing (EKG, telemetry) interpretation: As discussed  above  Radiology interpretation: As discussed above  Labs reviewed: As discussed above         Care Planning  Patient is a full code      Disposition  Patient can discharge home after resolution of acute condition and returned to baseline.  No  needed identified at this time.            Electronically signed by Abdoulaye Ames MD, 02/25/23, 01:51 CST.              Electronically signed by Abdoulaye Ames MD at 02/25/23 0348          Emergency Department Notes      Billy Carvajal RN at 02/24/23 1932        Pt was sent to the ED by his PCP d/t abnormal labs. Dr. Lomas is aware of pt and says pt needs to be admitted. Pt is awake and alert. Pt denies any pain, incident or injury. Pt does not have any other complaints or concerns at this time. No obvious DCAPBTLS noted. PMSx4.     Electronically signed by Billy Carvajal RN at 02/24/23 1934     Matilda Darden MD at 02/24/23 2115          EMERGENCY DEPARTMENT HISTORY AND PHYSICAL EXAM   Patient Name: Erick Luong   Chief Complaint   Patient presents with   • Abnormal Lab      History of Presenting Illness:    Erick Luong is a 67 y.o. male with PMH significant for CAD, diabetes, hypertension, hyperlipidemia, obesity who presents to the ED for abnormal lab.  Patient states he was told by his primary doctor to present to the ER after checking blood work 2 days ago and found to have WANDER.  Patient denies chest pain, shortness of breath, fever.  He states he has not been sleeping well lately and feels very tired.  Denies decreased urination, dysuria.  Endorses generalized abdominal cramping.     Past Medical History:   Past Medical History:   Diagnosis Date   • Arthritis    • Autonomic disease    • CAD (coronary artery disease) 02/06/2017   • Cervical radiculopathy 09/16/2021   • Chronic constipation with acute exaccerbation 05/10/2021   • Coronary artery disease    • Degeneration of cervical intervertebral disc 08/11/2021   • Diabetes mellitus (HCC)    •  Diabetic foot ulcer (HCC) 08/31/2020   • Diabetic polyneuropathy associated with type 2 diabetes mellitus (HCC) 01/18/2021   • Elevated cholesterol    • Gastroesophageal reflux disease 05/13/2019   • Headache    • HTN (hypertension), benign 05/03/2017   • Hyperlipidemia    • Hypertension    • Mixed hyperlipidemia 02/07/2017   • Multiple lung nodules 01/26/2020    5mm, 9 mm RLL identified 1/2020, not present 10/2019.   • Myocardial infarction (HCC)    • Osteomyelitis (HCC) 01/22/2020   • Osteomyelitis of fifth toe of right foot (HCC) 10/07/2019   • Pancreatitis    • Persistent insomnia 01/20/2020   • Renal disorder    • Sleep apnea 02/06/2017   • Sleep apnea with use of continuous positive airway pressure (CPAP)     NON-COMPLIANT   • Slow transit constipation 01/16/2019   • Spinal stenosis in cervical region 09/16/2021   • Vitamin D deficiency 03/02/2021        Past Surgical History:    Past Surgical History:   Procedure Laterality Date   • ABDOMINAL SURGERY     • AMPUTATION FOOT / TOE Left 10/2021    5th digit    • ANTERIOR CERVICAL DISCECTOMY W/ FUSION N/A 8/5/2022    Procedure: CERVICAL DISCECTOMY ANTERIOR WITH FUSION C5-6 with possible plating of C5-7 with neuromonitoring and 1 c-arm;  Surgeon: Karel Soliz MD;  Location:  [facility] OR;  Service: Neurosurgery;  Laterality: N/A;   • APPENDECTOMY     • BACK SURGERY     • CARDIAC CATHETERIZATION Left 02/08/2021    Procedure: Left Heart Cath w poss intervention left anatomical snuff box acess;  Surgeon: Omkar Charles DO;  Location:  [facility] CATH INVASIVE LOCATION;  Service: Cardiology;  Laterality: Left;   • CARDIAC SURGERY     • CATARACT EXTRACTION     • CERVICAL SPINE SURGERY     • COLONOSCOPY N/A 01/31/2017    Normal exam repeat in 5 years   • COLONOSCOPY N/A 02/11/2019    Mild acute inflammation   • COLONOSCOPY W/ POLYPECTOMY  03/04/2014    Hyperplastic polyp   • CORONARY ARTERY BYPASS GRAFT  10/2015   • ENDOSCOPY  04/13/2011    Gastritis with  "hemorrhage   • ENDOSCOPY N/A 2017    Normal exam   • ENDOSCOPY N/A 2019    Gastritis   • ENDOSCOPY N/A 2020    Non-erosive gastritis with hemorrhage   • ENDOSCOPY N/A 02/10/2021    Esophagitis   • FOOT SURGERY Left    • INCISION AND DRAINAGE OF WOUND Left 2007    spider bite        Family History:    Family History   Problem Relation Age of Onset   • Colon cancer Father    • Heart disease Father    • Colon cancer Sister    • Colon polyps Sister    • Alzheimer's disease Mother    • Coronary artery disease Sister    • Coronary artery disease Sister         Social History:  Social History     Socioeconomic History   • Marital status:    Tobacco Use   • Smoking status: Former     Types: Cigarettes     Quit date:      Years since quittin.1   • Smokeless tobacco: Never   • Tobacco comments:     smoked in [place]   Vaping Use   • Vaping Use: Never used   Substance and Sexual Activity   • Alcohol use: No   • Drug use: No   • Sexual activity: Defer        Allergies:    Allergies   Allergen Reactions   • Cefepime Hives and Anaphylaxis   • Bactrim [Sulfamethoxazole-Trimethoprim] Other (See Comments)     \"RENAL FAILURE\"   • Vancomycin Itching   • Zolpidem Unknown - High Severity     \"makes him crazy\"   • Zolpidem Tartrate Unknown - Low Severity and Provider Review Needed   • Metronidazole Rash        Medications:      Current Facility-Administered Medications:   •  acetaminophen (TYLENOL) tablet 650 mg, 650 mg, Oral, Q4H PRN, Abdoulaye Ames MD, 650 mg at 23  •  albuterol (PROVENTIL) nebulizer solution 0.083% 2.5 mg/3mL, 2.5 mg, Nebulization, Q4H PRN, Abdoulaye Ames MD  •  amitriptyline (ELAVIL) tablet 50 mg, 50 mg, Oral, Nightly, Abdoulaye Ames MD, 50 mg at 23 0013  •  aspirin chewable tablet 81 mg, 81 mg, Oral, Daily, Abdoulaye Ames MD, 81 mg at 23 0841  •  butalbital-acetaminophen-caffeine (FIORICET, ESGIC) -40 MG per tablet 2 tablet, 2 " tablet, Oral, Q4H PRN, Abdoulaye Ames MD, 2 tablet at 02/26/23 0841  •  carvedilol (COREG) tablet 6.25 mg, 6.25 mg, Oral, BID With Meals, Abdoulaye Ames MD, 6.25 mg at 02/26/23 0841  •  cetirizine (zyrTEC) tablet 10 mg, 10 mg, Oral, Daily, Abdoulaye Ames MD, 10 mg at 02/26/23 0841  •  cyclobenzaprine (FLEXERIL) tablet 5 mg, 5 mg, Oral, BID PRN, Abdoulaye Ames MD, 5 mg at 02/26/23 0013  •  dextrose (D50W) (25 g/50 mL) IV injection 25 g, 25 g, Intravenous, Q15 Min PRN, Abdoulaye Ames MD  •  dextrose (GLUTOSE) oral gel 15 g, 15 g, Oral, Q15 Min PRN, Abdoulaye Ames MD  •  donepezil (ARICEPT) tablet 10 mg, 10 mg, Oral, Daily, Abdoulaye Ames MD, 10 mg at 02/26/23 0841  •  glucagon (human recombinant) (GLUCAGEN DIAGNOSTIC) injection 1 mg, 1 mg, Intramuscular, Q15 Min PRN, Abdoulaye Ames MD  •  HYDROcodone-acetaminophen (NORCO) 7.5-325 MG per tablet 1 tablet, 1 tablet, Oral, Q6H PRN, Abdoulaye Ames MD, 1 tablet at 02/26/23 0841  •  Insulin Lispro (humaLOG) injection 2-7 Units, 2-7 Units, Subcutaneous, TID Hui CANELA Chimalum R, MD, 3 Units at 02/26/23 0841  •  metoclopramide (REGLAN) injection 10 mg, 10 mg, Intravenous, Q6H, Abdoulaye Ames MD, 10 mg at 02/26/23 0842  •  midodrine (PROAMATINE) tablet 2.5 mg, 2.5 mg, Oral, TID Abebe CANELA Shaukat, MD, 2.5 mg at 02/26/23 0841  •  ondansetron (ZOFRAN) injection 4 mg, 4 mg, Intravenous, Q6H PRN, Abdoulaye Ames MD, 4 mg at 02/25/23 2034  •  pregabalin (LYRICA) capsule 50 mg, 50 mg, Oral, TID, Abdoulaye Ames MD, 50 mg at 02/26/23 0850  •  sodium chloride 0.9 % infusion, 100 mL/hr, Intravenous, Continuous, Abdoulaye Ames MD, Last Rate: 100 mL/hr at 02/25/23 2358, 100 mL/hr at 02/25/23 2358  •  tamsulosin (FLOMAX) 24 hr capsule 0.4 mg, 0.4 mg, Oral, Daily, Abdoulaye Ames MD, 0.4 mg at 02/26/23 0841  •  terazosin (HYTRIN) capsule 5 mg, 5 mg, Oral, Nightly, Abdoulaye Ames MD  •  traZODone (DESYREL) tablet 100 mg,  "100 mg, Oral, Nightly PRN, Abdoulaye Ames MD, 100 mg at 02/26/23 0014     Review of Systems:   10-point review of systems personally reviewed and negative other than noted above in HPI     Physical Exam:    VS: /64 (BP Location: Left arm, Patient Position: Sitting)   Pulse 59   Temp 98 °F (36.7 °C) (Oral)   Resp 15   Ht 182.9 cm (72\")   Wt (!) 147 kg (323 lb 9.6 oz)   SpO2 98%   BMI 43.89 kg/m²    GENERAL: awake, appears fatigued  EYES: sclera anicteric, extra-occular movements grossly intact   EARS, NOSE, MOUTH, THROAT: atraumatic external nose and ears, dry mucous membranes   RESPIRATORY: Unlabored respiratory effort, no accessory muscle use, no audible stridor   CARDIOVASCULAR: Regular rate, warm extremities  GI: Nondistended, generalized tenderness to palpation in all quadrants without rebound tenderness or guarding  MUSCULOSKELETAL/EXTREMITIES: Extremities without obvious deformity, no cyanosis   SKIN: warm and dry with no obvious rashes   NEUROLOGIC: moving all 4 extremities symmetrically, CN II-XII grossly intact     Labs:   Labs Reviewed   COMPREHENSIVE METABOLIC PANEL - Abnormal; Notable for the following components:       Result Value    Glucose 392 (*)     BUN 55 (*)     Creatinine 3.22 (*)     Chloride 96 (*)     eGFR 20.4 (*)     All other components within normal limits    Narrative:     GFR Normal >60  Chronic Kidney Disease <60  Kidney Failure <15     LIPASE - Abnormal; Notable for the following components:    Lipase 77 (*)     All other components within normal limits   URINALYSIS W/ MICROSCOPIC IF INDICATED (NO CULTURE) - Abnormal; Notable for the following components:    Glucose, UA >=1000 mg/dL (3+) (*)     Protein,  mg/dL (2+) (*)     All other components within normal limits   SINGLE HSTROPONIN T - Abnormal; Notable for the following components:    HS Troponin T 52 (*)     All other components within normal limits    Narrative:     High Sensitive Troponin T Reference " Range:  <10.0 ng/L- Negative Female for AMI  <15.0 ng/L- Negative Male for AMI  >=10 - Abnormal Female indicating possible myocardial injury.  >=15 - Abnormal Male indicating possible myocardial injury.   Clinicians would have to utilize clinical acumen, EKG, Troponin, and serial changes to determine if it is an Acute Myocardial Infarction or myocardial injury due to an underlying chronic condition.        CBC WITH AUTO DIFFERENTIAL - Abnormal; Notable for the following components:    Hemoglobin 11.7 (*)     Hematocrit 36.1 (*)     Eosinophils, Absolute 0.44 (*)     All other components within normal limits   URINALYSIS, MICROSCOPIC ONLY - Abnormal; Notable for the following components:    RBC, UA 0-2 (*)     All other components within normal limits   SINGLE HSTROPONIN T - Abnormal; Notable for the following components:    HS Troponin T 44 (*)     All other components within normal limits    Narrative:     High Sensitive Troponin T Reference Range:  <10.0 ng/L- Negative Female for AMI  <15.0 ng/L- Negative Male for AMI  >=10 - Abnormal Female indicating possible myocardial injury.  >=15 - Abnormal Male indicating possible myocardial injury.   Clinicians would have to utilize clinical acumen, EKG, Troponin, and serial changes to determine if it is an Acute Myocardial Infarction or myocardial injury due to an underlying chronic condition.        IRON PROFILE - Abnormal; Notable for the following components:    Iron 57 (*)     Iron Saturation 17 (*)     All other components within normal limits   PTH, INTACT - Abnormal; Notable for the following components:    PTH, Intact 172.3 (*)     All other components within normal limits    Narrative:     Results may be falsely decreased if patient taking Biotin.     RENAL FUNCTION PANEL - Abnormal; Notable for the following components:    Glucose 250 (*)     BUN 49 (*)     Creatinine 2.82 (*)     Calcium 8.5 (*)     eGFR 23.9 (*)     All other components within normal limits     Narrative:     GFR Normal >60  Chronic Kidney Disease <60  Kidney Failure <15     CBC WITH AUTO DIFFERENTIAL - Abnormal; Notable for the following components:    Hemoglobin 11.5 (*)     Hematocrit 36.2 (*)     Eosinophil % 8.0 (*)     Eosinophils, Absolute 0.52 (*)     All other components within normal limits   POCT GLUCOSE FINGERSTICK - Abnormal; Notable for the following components:    Glucose 252 (*)     All other components within normal limits   POCT GLUCOSE FINGERSTICK - Abnormal; Notable for the following components:    Glucose 255 (*)     All other components within normal limits   POCT GLUCOSE FINGERSTICK - Abnormal; Notable for the following components:    Glucose 203 (*)     All other components within normal limits   POCT GLUCOSE FINGERSTICK - Abnormal; Notable for the following components:    Glucose 196 (*)     All other components within normal limits   POCT GLUCOSE FINGERSTICK - Abnormal; Notable for the following components:    Glucose 325 (*)     All other components within normal limits   POCT GLUCOSE FINGERSTICK - Abnormal; Notable for the following components:    Glucose 216 (*)     All other components within normal limits   APTT - Normal   PROTIME-INR - Normal   BNP (IN-HOUSE) - Normal    Narrative:     Among patients with dyspnea, NT-proBNP is highly sensitive for the detection of acute congestive heart failure. In addition NT-proBNP of <300 pg/ml effectively rules out acute congestive heart failure with 99% negative predictive value.    Results may be falsely decreased if patient taking Biotin.     LACTIC ACID, PLASMA - Normal   SODIUM, URINE, RANDOM    Narrative:     Reference intervals for random urine have not been established.  Clinical usage is dependent upon physician's interpretation in combination with other laboratory tests.      PROTEIN, URINE, RANDOM    Narrative:     Reference intervals for random urine have not been established.  Clinical usage is dependent upon physician's  interpretation in combination with other laboratory tests.      MICROALBUMIN / CREATININE URINE RATIO   CREATININE URINE RANDOM (KIDNEY FUNCTION) GFR COMPONENT   POCT GLUCOSE FINGERSTICK   POCT GLUCOSE FINGERSTICK   POCT GLUCOSE FINGERSTICK   POCT GLUCOSE FINGERSTICK   POCT GLUCOSE FINGERSTICK   POCT GLUCOSE FINGERSTICK   POCT GLUCOSE FINGERSTICK   POCT GLUCOSE FINGERSTICK   CBC AND DIFFERENTIAL    Narrative:     The following orders were created for panel order CBC & Differential.  Procedure                               Abnormality         Status                     ---------                               -----------         ------                     CBC Auto Differential[[id number]]        Abnormal            Final result                 Please view results for these tests on the individual orders.   CBC AND DIFFERENTIAL    Narrative:     The following orders were created for panel order CBC & Differential.  Procedure                               Abnormality         Status                     ---------                               -----------         ------                     CBC Auto Differential[[id number]]        Abnormal            Final result                 Please view results for these tests on the individual orders.      Radiology:   CT Abdomen Pelvis Without Contrast   Final Result   1. No acute intra-abdominal or pelvic abnormality is identified.   2. Probable mild constipation, no bowel obstruction.   3. Nonspecific increased attenuation and perinephric fat planes   bilaterally, no evidence urinary tract obstruction. There is a   benign-appearing cyst associated with each kidney.   4. Prior cholecystectomy.   5. Multilevel spinal stenosis, exacerbated by degenerative changes,   stable.           This report was finalized on 02/25/2023 02:18 by Dr. Percy Cesar MD.      XR Chest 1 View   Final Result   Shallow inspiration, no acute cardiopulmonary abnormality.       This report was finalized on  02/24/2023 21:05 by Dr. Percy Cesar MD.          Medical Decision Making:   Erick Luong is a 67 y.o. male who presents to the ED for WANDER. Patient was non-toxic appearing on arrival. Vital signs unremarkable upon arrival. Patient's presentation raises concern for obstructive etiology versus intrinsic, or prerenal. CT abdomen pelvis obtained which was pending at time of signout.    Labs were personally reviewed and notable for:   ED Course as of 02/26/23 0958 Fri Feb 24, 2023 2114 Chest x-ray unremarkable. [EP]   2114 Creatinine elevated 3.2, increased from baseline over the last few weeks around 2.5.  Glucose elevated at 392.  Anion gap 13.  Hemoglobin low at 11.7.  High-sensitivity troponin elevated at 52.  Lipase elevated at 77.  Lactic normal.  BNP normal. [EP]   2114 EKG interpreted by me: Normal sinus rhythm rate of 69.  LBBB, not meeting Sgarbossa's criteria.  Left axis deviation. [EP]      ED Course User Index  [EP] Matilda Darden MD      Given findings described above, patient's presentation is most consistent with prerenal WANDER, in the setting of CKD most likely secondary to diabetes and hypertension. The patient was given LR bolus for symptom control. On re-evaluation, patient remained hemodynamically stable and they are agreeable with current plan. Patient was signed out to Dr. Perez pending CT scan. I anticipate admission to [facility] for management of WANDER.    Signed:   Matilda Darden MD     This note was generated using speech recognition software and may contain homophonic word substitutions or errors.     (N17.9) WANDER (acute kidney injury) (HCC)    (R77.8) Elevated troponin    ED Disposition     ED Disposition   Decision to Admit    Condition   --    Comment   Level of Care: Telemetry [5]   Diagnosis: Elevated troponin [[id number]]   Admitting Physician: JENNA HALE [[id number]]   Attending Physician: JENNA HALE [[id number]]                Matilda Darden MD  02/26/23 1000      Electronically  signed by Matilda Darden MD at 02/26/23 1000       Vital Signs (last 3 days)     Date/Time Temp Temp src Pulse Resp BP Patient Position SpO2    02/27/23 0801 97.3 (36.3) Oral 71 18 143/66 Sitting 95    02/27/23 0402 97.4 (36.3) Oral 61 18 127/56 Sitting 97    02/26/23 2357 97.4 (36.3) Oral 59 18 139/60 Sitting 97    02/26/23 2004 98.1 (36.7) Oral 60 18 135/56 Sitting 100    02/26/23 1705 -- -- -- -- 162/65  Sitting --    BP: post ambulation at 02/26/23 1705    02/26/23 1700 -- -- -- -- 150/58  Sitting --    BP: preambulation at 02/26/23 1700    02/26/23 1530 98 (36.7) Oral 56 16 127/50 Lying 97    02/26/23 1139 98.3 (36.8) Oral 54 15 139/71 Lying 97    02/26/23 0804 98 (36.7) Oral 59 15 124/64 Sitting 98    02/26/23 0021 98.1 (36.7) Oral 70 16 140/59 Lying 96    02/25/23 1914 97.9 (36.6) Oral 71 18 126/60 Lying 97    02/25/23 1604 97.9 (36.6) Oral 70 16 129/56 Lying 98    02/25/23 1110 97.6 (36.4) Oral 66 16 114/53 Sitting 95    02/25/23 0745 97.5 (36.4) Oral 70 18 125/51 Sitting 100    02/25/23 0358 98.3 (36.8) Oral 71 18 110/96 Lying 100    02/25/23 0208 98.1 (36.7) Oral 77 18 138/61 Sitting 100    02/25/23 0146 -- -- 71 -- 135/69 -- 97    02/25/23 0129 -- -- 73 -- 134/75 -- 97    02/25/23 0118 -- -- 71 -- 121/71 -- 96    02/25/23 0059 -- -- 72 -- 124/69 -- 96    02/25/23 0048 -- -- 74 -- 131/67 -- 97    02/25/23 0018 -- -- 72 -- 125/63 -- 99    02/24/23 2348 -- -- 70 -- 135/68 -- 98    02/24/23 2333 -- -- 75 -- 124/60 -- 98    02/24/23 2323 -- -- 71 -- 132/56 -- 98    02/24/23 2203 -- -- 70 -- 140/70 -- 99    02/24/23 2145 -- -- 70 -- 132/67 -- 100    02/24/23 2115 -- -- 70 -- 129/71 -- 98    02/24/23 2045 -- -- 65 -- 133/52 -- 98    02/24/23 2015 -- -- 69 -- 129/73 -- 98    02/24/23 1931 -- -- 72 -- 137/70 -- 100    02/24/23 1901 -- -- 76 -- 131/77 -- 100    02/24/23 1825 97.1 (36.2) Oral 91 18 150/66 Sitting 99          Intake & Output (last 3 days)       02/24 0701 02/25 0700 02/25 0701 02/26 0700 02/26  0701  02/27 0700 02/27 0701  02/28 0700    P.O.  0 360     Total Intake(mL/kg)  0 (0) 360 (2.4)     Urine (mL/kg/hr)  200 (0.1) 1125 (0.3)     Total Output  200 1125     Net  -200 -765             Urine Unmeasured Occurrence  3 x             Facility-Administered Medications as of 2/27/2023   Medication Dose Route Frequency Provider Last Rate Last Admin   • acetaminophen (TYLENOL) tablet 650 mg  650 mg Oral Q4H PRN Abdoulaye Ames MD   650 mg at 02/25/23 2034   • albuterol (PROVENTIL) nebulizer solution 0.083% 2.5 mg/3mL  2.5 mg Nebulization Q4H PRN Abdoulaye Ames MD       • amitriptyline (ELAVIL) tablet 50 mg  50 mg Oral Nightly Abdoulaye Ames MD   50 mg at 02/26/23 2017   • [COMPLETED] aspirin chewable tablet 324 mg  324 mg Oral Once Matilda Darden MD   324 mg at 02/24/23 2134   • aspirin chewable tablet 81 mg  81 mg Oral Daily Abdoulaye Ames MD   81 mg at 02/26/23 0841   • butalbital-acetaminophen-caffeine (FIORICET, ESGIC) -40 MG per tablet 2 tablet  2 tablet Oral Q4H PRN Abdoulaye Ames MD   2 tablet at 02/26/23 1509   • carvedilol (COREG) tablet 6.25 mg  6.25 mg Oral BID With Meals Abdoulaye Ames MD   6.25 mg at 02/26/23 1806   • cetirizine (zyrTEC) tablet 10 mg  10 mg Oral Daily Abdoulaye Ames MD   10 mg at 02/26/23 0841   • cyclobenzaprine (FLEXERIL) tablet 5 mg  5 mg Oral BID PRN Abdoulaye Ames MD   5 mg at 02/26/23 1315   • dextrose (D50W) (25 g/50 mL) IV injection 25 g  25 g Intravenous Q15 Min PRN Abdoulaye Ames MD       • dextrose (GLUTOSE) oral gel 15 g  15 g Oral Q15 Min PRN Abdoulaye Ames MD       • donepezil (ARICEPT) tablet 10 mg  10 mg Oral Daily Abdoulaye Ames MD   10 mg at 02/26/23 0841   • glucagon (human recombinant) (GLUCAGEN DIAGNOSTIC) injection 1 mg  1 mg Intramuscular Q15 Min PRN Abdoulaye Ames MD       • HYDROcodone-acetaminophen (NORCO) 7.5-325 MG per tablet 1 tablet  1 tablet Oral Q6H PRN Abdoulaye Ames MD   1 tablet  at 02/26/23 2202   • Insulin Lispro (humaLOG) injection 2-7 Units  2-7 Units Subcutaneous TID AC Abdoulaye Ames MD   3 Units at 02/26/23 1806   • [COMPLETED] lactated ringers bolus 500 mL  500 mL Intravenous Once Matilda Darden MD   Stopped at 02/24/23 2325   • metoclopramide (REGLAN) injection 5 mg  5 mg Intravenous Q6H Alejandrina Barnett   5 mg at 02/27/23 0608   • midodrine (PROAMATINE) tablet 2.5 mg  2.5 mg Oral TID AC Brian Lomas MD   2.5 mg at 02/26/23 1806   • ondansetron (ZOFRAN) injection 4 mg  4 mg Intravenous Q6H PRN Abdoulaye Ames MD   4 mg at 02/26/23 1704   • pregabalin (LYRICA) capsule 50 mg  50 mg Oral TID Abdoulaye Ames MD   50 mg at 02/26/23 2017   • sodium chloride 0.9 % infusion  100 mL/hr Intravenous Continuous Abdoulaye Ames  mL/hr at 02/26/23 2020 100 mL/hr at 02/26/23 2020   • tamsulosin (FLOMAX) 24 hr capsule 0.4 mg  0.4 mg Oral Daily Abdoulaye Ames MD   0.4 mg at 02/26/23 0841   • terazosin (HYTRIN) capsule 5 mg  5 mg Oral Nightly Abdoulaye Ames MD   5 mg at 02/26/23 2017   • traZODone (DESYREL) tablet 100 mg  100 mg Oral Nightly PRN Abdoulaye Ames MD   100 mg at 02/26/23 0014     Orders (last 7 days)      Start     Ordered    02/27/23 0808  POC Glucose Once  PROCEDURE ONCE         02/27/23 0757    02/27/23 0600  Comprehensive Metabolic Panel  Morning Draw         02/26/23 1220    02/27/23 0600  CBC & Differential  Morning Draw         02/26/23 1523    02/27/23 0600  Comprehensive Metabolic Panel  Morning Draw,   Status:  Canceled         02/26/23 1523    02/27/23 0600  CBC Auto Differential  PROCEDURE ONCE         02/26/23 2202 02/26/23 1800  metoclopramide (REGLAN) injection 5 mg  Every 6 Hours         02/26/23 1712    02/26/23 1709  POC Glucose Once  PROCEDURE ONCE         02/26/23 1657    02/26/23 1150  POC Glucose Once  PROCEDURE ONCE         02/26/23 1138    02/26/23 0900  cetirizine (zyrTEC) tablet 10 mg  Daily         02/25/23 3468     02/26/23 0900  tamsulosin (FLOMAX) 24 hr capsule 0.4 mg  Daily         02/25/23 2325 02/26/23 0813  POC Glucose Once  PROCEDURE ONCE         02/26/23 0802    02/26/23 0600  CBC & Differential  Morning Draw         02/25/23 1426    02/26/23 0600  Renal Function Panel  Morning Draw         02/25/23 1426    02/26/23 0600  CBC Auto Differential  PROCEDURE ONCE         02/25/23 2202 02/26/23 0245  HYDROcodone-acetaminophen (NORCO) 7.5-325 MG per tablet 1 tablet  Every 6 Hours PRN         02/26/23 0239    02/26/23 0243  butalbital-acetaminophen-caffeine (FIORICET, ESGIC) -40 MG per tablet 2 tablet  Every 4 Hours PRN         02/26/23 0243    02/26/23 0015  terazosin (HYTRIN) capsule 5 mg  Nightly         02/25/23 2325    02/26/23 0000  pregabalin (LYRICA) capsule 50 mg  3 Times Daily         02/25/23 2325    02/26/23 0000  metoclopramide (REGLAN) injection 10 mg  Every 6 Hours,   Status:  Discontinued         02/25/23 2328    02/26/23 0000  amitriptyline (ELAVIL) tablet 50 mg  Nightly         02/25/23 2334    02/25/23 2323  traZODone (DESYREL) tablet 100 mg  Nightly PRN         02/25/23 2325    02/25/23 2321  cyclobenzaprine (FLEXERIL) tablet 5 mg  2 Times Daily PRN         02/25/23 2325    02/25/23 2044  POC Glucose Once  PROCEDURE ONCE         02/25/23 2032 02/25/23 1955  ondansetron (ZOFRAN) injection 4 mg  Every 6 Hours PRN         02/25/23 1955 02/25/23 1951  acetaminophen (TYLENOL) tablet 650 mg  Every 4 Hours PRN         02/25/23 1955 02/25/23 1730  midodrine (PROAMATINE) tablet 2.5 mg  3 Times Daily Before Meals         02/25/23 1426    02/25/23 1624  POC Glucose Once  PROCEDURE ONCE         02/25/23 1605    02/25/23 1602  Diet: Diabetic Diets, Renal Diets; Consistent Carbohydrate; Low Sodium (2-3g); Texture: Regular Texture (IDDSI 7); Fluid Consistency: Thin (IDDSI 0)  Diet Effective Now         02/25/23 1602    02/25/23 1426  Sodium, Urine, Random - Urine, Clean Catch  Once         02/25/23  1426    02/25/23 1426  Protein, Urine, Random - Urine, Clean Catch  Once         02/25/23 1426    02/25/23 1426  Iron Profile  Once         02/25/23 1426    02/25/23 1426  Microalbumin / Creatinine Urine Ratio - Urine, Clean Catch  Once         02/25/23 1426    02/25/23 1426  PTH, Intact  Once         02/25/23 1426    02/25/23 1426  Creatinine Urine Random (kidney function) GFR component - Urine, Clean Catch  Once         02/25/23 1426    02/25/23 1205  NPO Diet NPO Type: Sips with Meds  Diet Effective Now,   Status:  Canceled         02/25/23 1205    02/25/23 1127  POC Glucose Once  PROCEDURE ONCE         02/25/23 1114    02/25/23 0900  aspirin chewable tablet 81 mg  Daily         02/25/23 0159    02/25/23 0900  donepezil (ARICEPT) tablet 10 mg  Daily         02/25/23 0159    02/25/23 0806  POC Glucose Once  PROCEDURE ONCE         02/25/23 0748    02/25/23 0800  carvedilol (COREG) tablet 6.25 mg  2 Times Daily With Meals         02/25/23 0159    02/25/23 0730  Insulin Lispro (humaLOG) injection 2-7 Units  3 Times Daily Before Meals         02/25/23 0155    02/25/23 0700  POC Glucose TID AC  3 Times Daily Before Meals       02/25/23 0155    02/25/23 0330  HYDROcodone-acetaminophen (NORCO) 7.5-325 MG per tablet 1 tablet  2 Times Daily PRN,   Status:  Discontinued         02/25/23 0332    02/25/23 0224  Inpatient Diabetes Educator Consult  Once        Provider:  (Not yet assigned)    02/25/23 0223    02/25/23 0223  POC Glucose Once  PROCEDURE ONCE         02/25/23 0212    02/25/23 0205  albuterol (PROVENTIL) nebulizer solution 0.083% 2.5 mg/3mL  Every 4 Hours PRN         02/25/23 0206    02/25/23 0202  Inpatient Nephrology Consult  Once        Specialty:  Nephrology  Provider:  Brian Lomas MD    02/25/23 0202    02/25/23 0157  albuterol sulfate HFA (PROVENTIL HFA;VENTOLIN HFA;PROAIR HFA) inhaler 2 puff  Every 4 Hours PRN,   Status:  Discontinued         02/25/23 0159    02/25/23 0157  sodium chloride 0.9 % infusion   Continuous         02/25/23 0155    02/25/23 0156  Code Status and Medical Interventions:  Continuous         02/25/23 0155    02/25/23 0155  Do NOT Hold Basal or Correction Scale Insulin When Patient is NPO, Hold Scheduled Mealtime (Bolus) Insulin if NPO  Continuous         02/25/23 0155    02/25/23 0154  dextrose (GLUTOSE) oral gel 15 g  Every 15 Minutes PRN         02/25/23 0155    02/25/23 0154  dextrose (D50W) (25 g/50 mL) IV injection 25 g  Every 15 Minutes PRN         02/25/23 0155    02/25/23 0154  glucagon (human recombinant) (GLUCAGEN DIAGNOSTIC) injection 1 mg  Every 15 Minutes PRN         02/25/23 0155    02/25/23 0137  ED Bed Request  Once         02/25/23 0136    02/25/23 0109  Inpatient Admission  Once         02/25/23 0108 02/24/23 2357  ECG 12 Lead Electrolyte Imbalance  Once         02/24/23 2357 02/24/23 2331  Single High Sensitivity Troponin T  Once         02/24/23 2331 02/24/23 2122  Urinalysis, Microscopic Only - Urine, Clean Catch  Once         02/24/23 2121 02/24/23 2119  lactated ringers bolus 500 mL  Once         02/24/23 2117 02/24/23 2058  CT Abdomen Pelvis Without Contrast  1 Time Imaging         02/24/23 2057 02/24/23 2057  aspirin chewable tablet 324 mg  Once         02/24/23 2055 02/24/23 2055  XR Chest 1 View  1 Time Imaging         02/24/23 2054 02/24/23 1945  BNP  Once         02/24/23 1944 02/24/23 1945  Lactic Acid, Plasma  Once         02/24/23 1944 02/24/23 1945  ECG 12 Lead Electrolyte Imbalance  Once         02/24/23 1944 02/24/23 1944  Comprehensive Metabolic Panel  Once         02/24/23 1944 02/24/23 1944  CBC & Differential  Once         02/24/23 1944 02/24/23 1944  aPTT  Once         02/24/23 1944 02/24/23 1944  Protime-INR  Once         02/24/23 1944 02/24/23 1944  Lipase  Once         02/24/23 1944 02/24/23 1944  Urinalysis With Microscopic If Indicated (No Culture) - Urine, Clean Catch  Once         02/24/23 1944     02/24/23 1944  Single High Sensitivity Troponin T  Once         02/24/23 1944    02/24/23 1944  CBC Auto Differential  PROCEDURE ONCE         02/24/23 1944 02/24/23 1835  POC Glucose STAT  STAT         02/24/23 1834    Unscheduled  Follow Hypoglycemia Standing Orders For Blood Glucose <70 & Notify Provider of Treatment  As Needed      Comments: Follow Hypoglycemia Orders As Outlined in Process Instructions (Open Order Report to View Full Instructions)  Notify Provider Any Time Hypoglycemia Treatment is Administered    02/25/23 0155    --  SCANNED - TELEMETRY           02/24/23 0000    --  SCANNED EKG         02/24/23 0000    --  SCANNED - TELEMETRY           02/24/23 0000                   Physician Progress Notes (last 7 days)      Brian Lomas MD at 02/26/23 1519          Nephrology ([facility]) Progress Note      Patient:  Erick Luong  YOB: 1955  Date of Service: 2/26/2023  MRN: [id number]   Acct: [id number]   Primary Care Physician: Del Shetty MD  Advance Directive:   Code Status and Medical Interventions:   Ordered at: 02/25/23 0155     Code Status (Patient has no pulse and is not breathing):    CPR (Attempt to Resuscitate)     Medical Interventions (Patient has pulse or is breathing):    Full Support     Admit Date: 2/24/2023       Hospital Day: 1  Referring Provider: Piter Perez MD      Patient personally seen and examined.  Complete chart including Consults, Notes, Operative Reports, Labs, Cardiology, and Radiology studies reviewed as able.    Chief complaint: Abnormal labs.    Subjective:  Erick Luong is a 67 y.o. male  whom we were consulted for acute kidney injury.  He has history of stage IIIb chronic kidney disease baseline, follows me in the office.  His last estimated GFR is 40 mL.  Patient also has history of morbid abdominal obesity, chronic kidney disease, coronary artery disease, hypertension and hyperlipidemia.  Patient was directed to go  to hospital by his primary care doctor.  As he was found to have fairly abnormal renal labs.  He was also reporting decreasing blood pressure and blood sugar.  Patient denies any nausea vomiting or diarrhea.  His intake of fluid has been very poor.  He denies any use of nonsteroidal agents.  Patient also denies any shortness of breath or swelling of lower extremities.  Incidental finding of the labs showed serum creatinine was 3.5 mg, baseline creatinine usually 1.8 mg.    This afternoon he feels well.  He denies any shortness of breath and swelling of lower extremities.  His renal function is slowly responding to IV fluid            Allergies:  Cefepime, Bactrim [sulfamethoxazole-trimethoprim], Vancomycin, Zolpidem, Zolpidem tartrate, and Metronidazole    Home Meds:  Medications Prior to Admission   Medication Sig Dispense Refill Last Dose   • amitriptyline (ELAVIL) 25 MG tablet Take 2 tablets by mouth Daily at bedtime. 60 tablet 1 Past Week   • Aspirin 81 MG capsule Take 81 mg by mouth Daily.   Past Week   • Azelastine HCl 137 MCG/SPRAY solution Use 2 sprays into the nostril(s) as directed by provider 2 (Two) Times a Day. 30 mL 3 Past Week   • bumetanide (BUMEX) 2 MG tablet Take 1 tablet by mouth 2 (Two) Times a Day for 30 days. 60 tablet 0 Past Week   • busPIRone (BUSPAR) 10 MG tablet Take 1 tablet by mouth 3 (Three) Times a Day As Needed. 270 tablet 4 Past Week   • calcitriol (ROCALTROL) 0.5 MCG capsule Take 1 capsule by mouth Daily. 90 capsule 4 Past Week   • carvedilol (COREG) 6.25 MG tablet Take 1 tablet by mouth 2 (Two) Times a Day. 180 tablet 4 Past Week   • Cholecalciferol 125 MCG (5000 UT) tablet Take 1 tablet by mouth Daily with meal 30 tablet 2 Past Week   • cloNIDine (CATAPRES) 0.1 MG tablet Take 0.1 mg by mouth Every 12 (Twelve) Hours.   Past Week   • colchicine 0.6 MG tablet Take 0.6 mg by mouth Daily.   Past Week   • donepezil (ARICEPT) 10 MG tablet Take 1 tablet by mouth Daily. 90 tablet 4 Past  Week   • Dulaglutide (Trulicity) 3 MG/0.5ML solution pen-injector Inject 3 mg under the skin into the appropriate area as directed Every 7 (Seven) Days. 4 mL 11 Past Week   • DULoxetine (CYMBALTA) 60 MG capsule Take 1 capsule by mouth Daily. 90 capsule 4 Past Week   • fexofenadine (ALLEGRA) 180 MG tablet Take 1 tablet by mouth Daily. 30 tablet 2 Past Week   • gabapentin (NEURONTIN) 100 MG capsule Take 100 mg by mouth 2 (Two) Times a Day.   Past Week   • insulin aspart (novoLOG FLEXPEN) 100 UNIT/ML solution pen-injector sc pen Inject up to 150 units subcutaneously twice daily according to sliding scale instructions from provider. 90 mL 1 Past Week   • irbesartan (AVAPRO) 150 MG tablet Take 150 mg by mouth Daily.   Past Week   • albuterol sulfate  (90 Base) MCG/ACT inhaler Inhale 2 puffs Every 4 (Four) Hours As Needed for Wheezing. 8 g 1    • Continuous Blood Gluc  (Dexcom G6 ) device Take As Directed. 1 each 2    • Continuous Blood Gluc Sensor (Dexcom G6 Sensor) Use As Directed. 1 each 2    • Continuous Blood Gluc Transmit (Dexcom G6 Transmitter) misc Use As Directed. 1 each 2    • cyclobenzaprine (FLEXERIL) 5 MG tablet Take 1 tablet by mouth 2 (Two) Times a Day As Needed for muscle spasms 60 tablet 5    • HYDROcodone-acetaminophen (NORCO) 7.5-325 MG per tablet Take 1 tablet by mouth 2 (Two) Times a Day As Needed. 45 tablet 0    • Insulin Pen Needle 31G X 5 MM misc Use three times a day. 100 each 11    • Insulin Regular Human, Conc, (HumuLIN R U-500 KwikPen) 500 UNIT/ML solution pen-injector CONCENTRATED injection Inject 120 Units under the skin into the appropriate area as directed 2 (Two) Times a Day with breakfast and dinner. 18 mL 7    • lactulose (CHRONULAC) 10 GM/15ML solution solution (encephalopathy) Take 30 ml by mouth once daily for 1 month 900 mL 0    • losartan (COZAAR) 100 MG tablet losartan 100 mg tablet      • lubiprostone (Amitiza) 24 MCG capsule Take 1 capsule by mouth 2 (Two)  Times a Day--Replaces Trulance 60 capsule 0    • methylcellulose (Citrucel) oral powder Take 5 g twice a day by oral route for 90 days. 900 g 10    • methylcellulose, Laxative, (CITRUCEL) 500 MG tablet tablet 1 tablet Daily.      • metOLazone (ZAROXOLYN) 2.5 MG tablet metolazone 2.5 mg tablet      • midodrine (PROAMATINE) 10 MG tablet midodrine 10 mg tablet      • nebivolol (BYSTOLIC) 5 MG tablet Bystolic 5 mg tablet   TAKE 1 TABLET BY MOUTH EVERY DAY      • ondansetron (Zofran) 4 MG tablet Take 1 tablet by mouth 3 (Three) Times a Day. 15 tablet 1    • pantoprazole (Protonix) 40 MG EC tablet Take 1 tablet by mouth 2 (Two) Times a Day. 180 tablet 4    • Plecanatide (Trulance) 3 MG tablet Take 1 tablet by mouth Daily. 30 tablet 0    • polyethylene glycol (GaviLyte-G) 236 g solution GaviLyte-G 236 gram-22.74 gram-6.74 gram-5.86 gram oral solution      • polyethylene glycol (MIRALAX) 17 GM/SCOOP powder Take 17 g by mouth 2 (Two) Times a Day. 3060 g 4    • polyethylene glycol-electrolytes (NULYTELY) 420 g solution See Admin Instructions.      • prazosin (MINIPRESS) 1 MG capsule Take 1 capsule by mouth Daily at bedtime. 30 capsule 2    • prazosin (MINIPRESS) 2 MG capsule Take 1 capsule by mouth every night at bedtime. 30 capsule 4    • pregabalin (LYRICA) 50 MG capsule Take 1 capsule by mouth 3 (Three) Times a Day. 90 capsule 2    • rosuvastatin (CRESTOR) 40 MG tablet Take 1 tablet by mouth Every Night. 90 tablet 3    • Sodium Phosphates (fleet enema) 7-19 GM/118ML enema Insert 133 mL by rectal route tonight.  If no BM, repeat in the AM. 133 mL 2    • Sodium Phosphates (Fleets) enema enema 133 mL.      • [] sucralfate (CARAFATE) 1 GM/10ML suspension Take 10 mL by mouth 4 (Four) Times a Day Before Meals & at Bedtime for 30 days. 1200 mL 0    • tamsulosin (Flomax) 0.4 MG capsule 24 hr capsule Take 1 capsule by mouth Daily. 90 capsule 0    • timolol (TIMOPTIC) 0.5 % ophthalmic solution Administer 1 drop to both eyes 2  (Two) Times a Day. 15 mL 3    • traZODone (DESYREL) 100 MG tablet Take 1 tablet by mouth once daily at bedtime 90 tablet 4    • Zinc Sulfate 220 (50 Zn) MG tablet Take 1 tablet by mouth Daily. 30 each 0        Medicines:  Current Facility-Administered Medications   Medication Dose Route Frequency Provider Last Rate Last Admin   • acetaminophen (TYLENOL) tablet 650 mg  650 mg Oral Q4H PRN Abdoulaye Ames MD   650 mg at 02/25/23 2034   • albuterol (PROVENTIL) nebulizer solution 0.083% 2.5 mg/3mL  2.5 mg Nebulization Q4H PRN Abdoulaye Ames MD       • amitriptyline (ELAVIL) tablet 50 mg  50 mg Oral Nightly Abdoulaye Ames MD   50 mg at 02/26/23 0013   • aspirin chewable tablet 81 mg  81 mg Oral Daily Abdoulaye Ames MD   81 mg at 02/26/23 0841   • butalbital-acetaminophen-caffeine (FIORICET, ESGIC) -40 MG per tablet 2 tablet  2 tablet Oral Q4H PRN Abdoulaye Ames MD   2 tablet at 02/26/23 1509   • carvedilol (COREG) tablet 6.25 mg  6.25 mg Oral BID With Meals Abdoulaye Ames MD   6.25 mg at 02/26/23 0841   • cetirizine (zyrTEC) tablet 10 mg  10 mg Oral Daily Abdoulaye Ames MD   10 mg at 02/26/23 0841   • cyclobenzaprine (FLEXERIL) tablet 5 mg  5 mg Oral BID PRN Abdoulaye Ames MD   5 mg at 02/26/23 1315   • dextrose (D50W) (25 g/50 mL) IV injection 25 g  25 g Intravenous Q15 Min PRN Abdoulaye Ames MD       • dextrose (GLUTOSE) oral gel 15 g  15 g Oral Q15 Min PRN Abdoulaye Ames MD       • donepezil (ARICEPT) tablet 10 mg  10 mg Oral Daily Abdoulaye Ames MD   10 mg at 02/26/23 0841   • glucagon (human recombinant) (GLUCAGEN DIAGNOSTIC) injection 1 mg  1 mg Intramuscular Q15 Min PRN Abdoulaye Ames MD       • HYDROcodone-acetaminophen (NORCO) 7.5-325 MG per tablet 1 tablet  1 tablet Oral Q6H PRN Abdoulaye Ames MD   1 tablet at 02/26/23 1509   • Insulin Lispro (humaLOG) injection 2-7 Units  2-7 Units Subcutaneous TID AC Abdoulaye Ames MD   5 Units at  02/26/23 1316   • metoclopramide (REGLAN) injection 10 mg  10 mg Intravenous Q6H Abdoulaye Ames MD   10 mg at 02/26/23 1317   • midodrine (PROAMATINE) tablet 2.5 mg  2.5 mg Oral TID Brian Thompson MD   2.5 mg at 02/26/23 1316   • ondansetron (ZOFRAN) injection 4 mg  4 mg Intravenous Q6H PRN Abdoulaye Ames MD   4 mg at 02/25/23 2034   • pregabalin (LYRICA) capsule 50 mg  50 mg Oral TID Abdoulaye Ames MD   50 mg at 02/26/23 1509   • sodium chloride 0.9 % infusion  100 mL/hr Intravenous Continuous Abdoulaye Ames  mL/hr at 02/25/23 2358 100 mL/hr at 02/25/23 2358   • tamsulosin (FLOMAX) 24 hr capsule 0.4 mg  0.4 mg Oral Daily Abdoulaye Ames MD   0.4 mg at 02/26/23 0841   • terazosin (HYTRIN) capsule 5 mg  5 mg Oral Nightly Abdoulaye Ames MD       • traZODone (DESYREL) tablet 100 mg  100 mg Oral Nightly PRN Abdoulaye Ames MD   100 mg at 02/26/23 0014       Past Medical History:  Past Medical History:   Diagnosis Date   • Arthritis    • Autonomic disease    • CAD (coronary artery disease) 02/06/2017   • Cervical radiculopathy 09/16/2021   • Chronic constipation with acute exaccerbation 05/10/2021   • Coronary artery disease    • Degeneration of cervical intervertebral disc 08/11/2021   • Diabetes mellitus (HCC)    • Diabetic foot ulcer (HCC) 08/31/2020   • Diabetic polyneuropathy associated with type 2 diabetes mellitus (HCC) 01/18/2021   • Elevated cholesterol    • Gastroesophageal reflux disease 05/13/2019   • Headache    • HTN (hypertension), benign 05/03/2017   • Hyperlipidemia    • Hypertension    • Mixed hyperlipidemia 02/07/2017   • Multiple lung nodules 01/26/2020    5mm, 9 mm RLL identified 1/2020, not present 10/2019.   • Myocardial infarction (HCC)    • Osteomyelitis (HCC) 01/22/2020   • Osteomyelitis of fifth toe of right foot ([facility]) 10/07/2019   • Pancreatitis    • Persistent insomnia 01/20/2020   • Renal disorder    • Sleep apnea 02/06/2017   • Sleep apnea with use  of continuous positive airway pressure (CPAP)     NON-COMPLIANT   • Slow transit constipation 01/16/2019   • Spinal stenosis in cervical region 09/16/2021   • Vitamin D deficiency 03/02/2021       Past Surgical History:  Past Surgical History:   Procedure Laterality Date   • ABDOMINAL SURGERY     • AMPUTATION FOOT / TOE Left 10/2021    5th digit    • ANTERIOR CERVICAL DISCECTOMY W/ FUSION N/A 8/5/2022    Procedure: CERVICAL DISCECTOMY ANTERIOR WITH FUSION C5-6 with possible plating of C5-7 with neuromonitoring and 1 c-arm;  Surgeon: Karel Soliz MD;  Location:  [facility] OR;  Service: Neurosurgery;  Laterality: N/A;   • APPENDECTOMY     • BACK SURGERY     • CARDIAC CATHETERIZATION Left 02/08/2021    Procedure: Left Heart Cath w poss intervention left anatomical snuff box acess;  Surgeon: Omkar Charles DO;  Location:  [facility] CATH INVASIVE LOCATION;  Service: Cardiology;  Laterality: Left;   • CARDIAC SURGERY     • CATARACT EXTRACTION     • CERVICAL SPINE SURGERY     • COLONOSCOPY N/A 01/31/2017    Normal exam repeat in 5 years   • COLONOSCOPY N/A 02/11/2019    Mild acute inflammation   • COLONOSCOPY W/ POLYPECTOMY  03/04/2014    Hyperplastic polyp   • CORONARY ARTERY BYPASS GRAFT  10/2015   • ENDOSCOPY  04/13/2011    Gastritis with hemorrhage   • ENDOSCOPY N/A 05/05/2017    Normal exam   • ENDOSCOPY N/A 02/11/2019    Gastritis   • ENDOSCOPY N/A 09/01/2020    Non-erosive gastritis with hemorrhage   • ENDOSCOPY N/A 02/10/2021    Esophagitis   • FOOT SURGERY Left    • INCISION AND DRAINAGE OF WOUND Left 09/2007    spider bite       Family History  Family History   Problem Relation Age of Onset   • Colon cancer Father    • Heart disease Father    • Colon cancer Sister    • Colon polyps Sister    • Alzheimer's disease Mother    • Coronary artery disease Sister    • Coronary artery disease Sister        Social History  Social History     Socioeconomic History   • Marital status:    Tobacco Use   •  Smoking status: Former     Types: Cigarettes     Quit date:      Years since quittin.1   • Smokeless tobacco: Never   • Tobacco comments:     smoked in highschool   Vaping Use   • Vaping Use: Never used   Substance and Sexual Activity   • Alcohol use: No   • Drug use: No   • Sexual activity: Defer       Review of Systems:  History obtained from chart review and the patient  General ROS: No fever or chills  Respiratory ROS: No cough, shortness of breath, wheezing  Cardiovascular ROS: No chest pain or palpitations  Gastrointestinal ROS: No abdominal pain or melena  Genito-Urinary ROS: No dysuria or hematuria  Psych ROS: No anxiety and depression  14 point ROS reviewed with the patient and negative except as noted above and in the HPI unless unable to obtain.    Objective:  Patient Vitals for the past 24 hrs:   BP Temp Temp src Pulse Resp SpO2 Weight   23 1139 139/71 98.3 °F (36.8 °C) Oral 54 15 97 % --   23 0804 124/64 98 °F (36.7 °C) Oral 59 15 98 % --   23 0021 140/59 98.1 °F (36.7 °C) Oral 70 16 96 % --   23 2110 -- -- -- -- -- -- (!) 147 kg (323 lb 9.6 oz)   23 1914 126/60 97.9 °F (36.6 °C) Oral 71 18 97 % --   23 1604 129/56 97.9 °F (36.6 °C) Oral 70 16 98 % --       Intake/Output Summary (Last 24 hours) at 2023 1520  Last data filed at 2023 0900  Gross per 24 hour   Intake 120 ml   Output 200 ml   Net -80 ml     General: awake/alert    HEENT: Normocephalic atraumatic head  Neck: Supple with no JVD or carotid bruits  Chest:  clear to auscultation bilaterally without respiratory distress  CVS: regular rate and rhythm  Abdominal: soft, nontender, positive bowel sounds  Extremities: no cyanosis or edema  Skin: warm and dry without rash      Labs:  Results from last 7 days   Lab Units 23  0547 23  2017 23  1007   WBC 10*3/mm3 6.47 8.54 7.83   HEMOGLOBIN g/dL 11.5* 11.7* 12.1*   HEMATOCRIT % 36.2* 36.1* 35.8*   PLATELETS 10*3/mm3 191 200 214          Results from last 7 days   Lab Units 02/26/23  0547 02/24/23 2017 02/22/23  1007   SODIUM mmol/L 137 136 140   POTASSIUM mmol/L 4.9 4.9 4.2   CHLORIDE mmol/L 104 96* 101   CO2 mmol/L 23.0 27.0 25.6   BUN mg/dL 49* 55* 68*   CREATININE mg/dL 2.82* 3.22* 3.58*   CALCIUM mg/dL 8.5* 8.8 8.9   EGFR mL/min/1.73 23.9* 20.4* 18.0*   BILIRUBIN mg/dL  --  0.4 0.4   ALK PHOS U/L  --  79 72   ALT (SGPT) U/L  --  11 11   AST (SGOT) U/L  --  17 13   GLUCOSE mg/dL 250* 392* 69       Radiology:   Imaging Results (Last 72 Hours)     Procedure Component Value Units Date/Time    CT Abdomen Pelvis Without Contrast [[id number]] Collected: 02/25/23 0204     Updated: 02/25/23 0221    Narrative:      EXAMINATION: CT ABDOMEN PELVIS WO CONTRAST- 2/25/2023 2:04 AM CST     HISTORY: omar; N17.9-Acute kidney failure, unspecified; R77.8-Other  specified abnormalities of plasma proteins     DOSE: 1475 mGycm (Automatic exposure control technique was implemented  in an effort to keep the radiation dose as low as possible without  compromising image quality)     REPORT: Spiral CT of the abdomen and pelvis was performed without  contrast from the lung bases through the pubic symphysis. Reconstructed  coronal and sagittal images are also reviewed.     Comparison: CT abdomen pelvis 01/19/2023.     Review of lung windows demonstrates normal aeration of the lung bases.  There is heavy calcified plaque within the coronary arteries. Previous  median sternotomy is noted. Cholecystectomy clips are present. Liver and  spleen are homogeneous, evaluation of the solid abdominal organs is  limited without intravenous contrast. The stomach is decompressed. There  is fatty replacement of the pancreas. The adrenal glands are  unremarkable. There is increased attenuation of perirenal fat planes,  nonspecific. There is a benign-appearing exophytic cyst at the  mid/inferior pole the right kidney, measuring 3.9 cm. This is stable. No  hydronephrosis is identified.  There is a small cyst at the inferior pole  the left kidney that measures 1.3 cm. Mild distention of the bladder  without wall thickening. No free fluid or free air is identified. Bowel  loops are normal caliber, moderate stool volume is present. There is  mild fatty infiltration of the inguinal canals. Slightly increased  attenuation of subcutaneous fat planes over the anterior abdominal wall  is unchanged. This may reflect chronic scar tissue. Review of bone  windows shows no acute abnormality. Degenerative changes in the lumbar  spine exacerbate multilevel congenital spinal stenosis appears stable.  Mildly increased attenuation of body wall fat planes, may represent some  degree of generalized volume overload.       Impression:      1. No acute intra-abdominal or pelvic abnormality is identified.  2. Probable mild constipation, no bowel obstruction.  3. Nonspecific increased attenuation and perinephric fat planes  bilaterally, no evidence urinary tract obstruction. There is a  benign-appearing cyst associated with each kidney.  4. Prior cholecystectomy.  5. Multilevel spinal stenosis, exacerbated by degenerative changes,  stable.        This report was finalized on 02/25/2023 02:18 by Dr. Percy Cesar MD.    XR Chest 1 View [[id number]] Collected: 02/24/23 2105     Updated: 02/24/23 2108    Narrative:      EXAMINATION: XR CHEST 1 VW- 2/24/2023 9:05 PM CST     HISTORY: Chest pain.     REPORT: A frontal view of the chest was obtained.     COMPARISON: Chest x-ray 02/02/2023.        The lungs are hypoaerated, no focal infiltrate or pulmonary  consolidation is identified. No pneumothorax or effusion is identified.  Heart size is normal. 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 The osseous  structures show no acute findings. ACDF hardware is present.       Impression:      Shallow inspiration, no acute cardiopulmonary abnormality.     This report was finalized on 02/24/2023 21:05 by Dr. Percy Cesar MD.           Culture:  No results found for: BLOODCX, URINECX, WOUNDCX, MRSACX, RESPCX, STOOLCX      Assessment   1.  Acute kidney injury/improving.  2.  Intravascular volume depletion.  3.  Stage IIIb chronic kidney disease baseline.  4.  Type II diabetic nephropathy.  5.  Morbid abdominal obesity.  6.  Orthostatic hypotension  7.  Secondary hyperparathyroidism.    Plan:  1.  Continue IV fluid  2.  Continue to monitor renal function.  3.  Possible discharge to home tomorrow once creatinine close to 2.0 mg      Brian Lomas MD  2/26/2023  15:20 CST      Electronically signed by Brian Lomas MD at 02/26/23 1522     Alejandrina Barnett at 02/26/23 1215              [facility] Medicine Services  INPATIENT PROGRESS NOTE    Patient Name: Erick Luong  Date of Admission: 2/24/2023  Today's Date: 02/26/23  Length of Stay: 1  Primary Care Physician: Del Shetty MD    Subjective   Chief Complaint: abnormal kidney function labs.   HPI     Wants to go home  No physical complaints other than furniture is not comfortable.   No chest pain, shortness of breath, nausea.           Review of Systems   All pertinent negatives and positives are as above. All other systems have been reviewed and are negative unless otherwise stated.     Objective    Temp:  [97.9 °F (36.6 °C)-98.3 °F (36.8 °C)] 98.3 °F (36.8 °C)  Heart Rate:  [54-71] 54  Resp:  [15-18] 15  BP: (124-140)/(56-71) 139/71  Physical Exam  Vitals and nursing note reviewed.   Constitutional:       Appearance: He is obese.   HENT:      Head: Normocephalic and atraumatic.      Right Ear: External ear normal.      Left Ear: External ear normal.      Nose: Nose normal.      Mouth/Throat:      Mouth: Mucous membranes are moist.   Eyes:      Extraocular Movements: Extraocular movements intact.      Conjunctiva/sclera: Conjunctivae normal.      Pupils: Pupils are equal, round, and reactive to light.   Cardiovascular:      Rate and Rhythm: Normal rate  and regular rhythm.      Pulses: Normal pulses.      Heart sounds: No murmur heard.    No friction rub. No gallop.   Pulmonary:      Effort: Pulmonary effort is normal.      Breath sounds: Normal breath sounds.   Abdominal:      General: Bowel sounds are normal.      Palpations: Abdomen is soft.   Musculoskeletal:         General: Normal range of motion.      Cervical back: Normal range of motion and neck supple.   Skin:     General: Skin is warm and dry.      Capillary Refill: Capillary refill takes less than 2 seconds.   Neurological:      General: No focal deficit present.      Mental Status: He is alert and oriented to person, place, and time.      Cranial Nerves: No cranial nerve deficit.   Psychiatric:         Mood and Affect: Mood normal.         Behavior: Behavior normal.             Results Review:  I have reviewed the labs, radiology results, and diagnostic studies.    Laboratory Data:   Results from last 7 days   Lab Units 02/26/23  0547 02/24/23  2017 02/22/23  1007   WBC 10*3/mm3 6.47 8.54 7.83   HEMOGLOBIN g/dL 11.5* 11.7* 12.1*   HEMATOCRIT % 36.2* 36.1* 35.8*   PLATELETS 10*3/mm3 191 200 214        Results from last 7 days   Lab Units 02/26/23  0547 02/24/23  2017 02/22/23  1007   SODIUM mmol/L 137 136 140   POTASSIUM mmol/L 4.9 4.9 4.2   CHLORIDE mmol/L 104 96* 101   CO2 mmol/L 23.0 27.0 25.6   BUN mg/dL 49* 55* 68*   CREATININE mg/dL 2.82* 3.22* 3.58*   CALCIUM mg/dL 8.5* 8.8 8.9   BILIRUBIN mg/dL  --  0.4 0.4   ALK PHOS U/L  --  79 72   ALT (SGPT) U/L  --  11 11   AST (SGOT) U/L  --  17 13   GLUCOSE mg/dL 250* 392* 69       Culture Data:   No results found for: BLOODCX, URINECX, WOUNDCX, MRSACX, RESPCX, STOOLCX    Radiology Data:   Imaging Results (Last 24 Hours)     ** No results found for the last 24 hours. **          I have reviewed the patient's current medications.     Assessment/Plan   Assessment  Active Hospital Problems    Diagnosis    • **WANDER (acute kidney injury) (HCC)    • Stage 3b  chronic kidney disease (HCC)    • Type 2 diabetes mellitus with hyperglycemia, with long-term current use of insulin (HCC)    • Obesity, unspecified obesity severity, unspecified obesity type        Treatment Plan  Continue fluids  Nephrology consult  Labs in AM  cmp      Medical Decision Making  Number and Complexity of problems:   [place]kel, high acuity  CKD, DM II, obesity moderate complexity    Differential Diagnosis: none    Conditions and Status        Condition is improving.     [facility] Data  External documents reviewed: no new  Cardiac tracing (EKG, telemetry) interpretation: none  Radiology interpretation: no new  Labs reviewed: reviewed  Any tests that were considered but not ordered: none     Decision rules/scores evaluated (example [id number], Wells, etc): none     Discussed with: patient     Care Planning  Shared decision making: patient  Code status and discussions: full    Disposition  Social Determinants of Health that impact treatment or disposition: none  I expect the patient to be discharged to home in 1-2 days.     Electronically signed by Alejandrina Barnett, 02/26/23, 12:17 CST.      Electronically signed by Alejandrina Barnett at 02/26/23 1219     Alejandrina Barnett at 02/25/23 1232              [facility] Medicine Services  INPATIENT PROGRESS NOTE    Patient Name: Erick Luong  Date of Admission: 2/24/2023  Today's Date: 02/25/23  Length of Stay: 0  Primary Care Physician: Del Shetty MD    Subjective   Chief Complaint: Abnormal labs  HPI   Patient is without complaints.  He was sent in for abnormal lab work.  Admitted after midnight.        Review of Systems   All pertinent negatives and positives are as above. All other systems have been reviewed and are negative unless otherwise stated.     Objective    Temp:  [97.1 °F (36.2 °C)-98.3 °F (36.8 °C)] 97.6 °F (36.4 °C)  Heart Rate:  [65-91] 66  Resp:  [16-18] 16  BP: (110-150)/(51-96) 114/53  Physical  Exam  Vitals and nursing note reviewed.   Constitutional:       Appearance: Normal appearance.   HENT:      Head: Normocephalic and atraumatic.      Right Ear: External ear normal.      Left Ear: External ear normal.      Nose: Nose normal.      Mouth/Throat:      Mouth: Mucous membranes are moist.   Eyes:      Extraocular Movements: Extraocular movements intact.      Conjunctiva/sclera: Conjunctivae normal.      Pupils: Pupils are equal, round, and reactive to light.   Cardiovascular:      Rate and Rhythm: Normal rate and regular rhythm.      Pulses: Normal pulses.      Heart sounds: No murmur heard.    No friction rub. No gallop.   Pulmonary:      Effort: Pulmonary effort is normal.      Breath sounds: Normal breath sounds.   Abdominal:      General: Bowel sounds are normal.      Palpations: Abdomen is soft.   Musculoskeletal:         General: Normal range of motion.      Cervical back: Normal range of motion and neck supple.   Skin:     General: Skin is warm and dry.      Capillary Refill: Capillary refill takes less than 2 seconds.      Comments: Patient has abrasions on forehead and chin from fall.   Neurological:      General: No focal deficit present.      Mental Status: He is alert and oriented to person, place, and time.      Cranial Nerves: No cranial nerve deficit.   Psychiatric:         Mood and Affect: Mood normal.         Behavior: Behavior normal.             Results Review:  I have reviewed the labs, radiology results, and diagnostic studies.    Laboratory Data:   Results from last 7 days   Lab Units 02/24/23  2017 02/22/23  1007   WBC 10*3/mm3 8.54 7.83   HEMOGLOBIN g/dL 11.7* 12.1*   HEMATOCRIT % 36.1* 35.8*   PLATELETS 10*3/mm3 200 214        Results from last 7 days   Lab Units 02/24/23  2017 02/22/23  1007   SODIUM mmol/L 136 140   POTASSIUM mmol/L 4.9 4.2   CHLORIDE mmol/L 96* 101   CO2 mmol/L 27.0 25.6   BUN mg/dL 55* 68*   CREATININE mg/dL 3.22* 3.58*   CALCIUM mg/dL 8.8 8.9   BILIRUBIN mg/dL  0.4 0.4   ALK PHOS U/L 79 72   ALT (SGPT) U/L 11 11   AST (SGOT) U/L 17 13   GLUCOSE mg/dL 392* 69       Culture Data:   No results found for: BLOODCX, URINECX, WOUNDCX, MRSACX, RESPCX, STOOLCX    Radiology Data:   Imaging Results (Last 24 Hours)     Procedure Component Value Units Date/Time    CT Abdomen Pelvis Without Contrast [[id number]] Collected: 02/25/23 0204     Updated: 02/25/23 0221    Narrative:      EXAMINATION: CT ABDOMEN PELVIS WO CONTRAST- 2/25/2023 2:04 AM CST     HISTORY: omar; N17.9-Acute kidney failure, unspecified; R77.8-Other  specified abnormalities of plasma proteins     DOSE: 1475 mGycm (Automatic exposure control technique was implemented  in an effort to keep the radiation dose as low as possible without  compromising image quality)     REPORT: Spiral CT of the abdomen and pelvis was performed without  contrast from the lung bases through the pubic symphysis. Reconstructed  coronal and sagittal images are also reviewed.     Comparison: CT abdomen pelvis 01/19/2023.     Review of lung windows demonstrates normal aeration of the lung bases.  There is heavy calcified plaque within the coronary arteries. Previous  median sternotomy is noted. Cholecystectomy clips are present. Liver and  spleen are homogeneous, evaluation of the solid abdominal organs is  limited without intravenous contrast. The stomach is decompressed. There  is fatty replacement of the pancreas. The adrenal glands are  unremarkable. There is increased attenuation of perirenal fat planes,  nonspecific. There is a benign-appearing exophytic cyst at the  mid/inferior pole the right kidney, measuring 3.9 cm. This is stable. No  hydronephrosis is identified. There is a small cyst at the inferior pole  the left kidney that measures 1.3 cm. Mild distention of the bladder  without wall thickening. No free fluid or free air is identified. Bowel  loops are normal caliber, moderate stool volume is present. There is  mild fatty  infiltration of the inguinal canals. Slightly increased  attenuation of subcutaneous fat planes over the anterior abdominal wall  is unchanged. This may reflect chronic scar tissue. Review of bone  windows shows no acute abnormality. Degenerative changes in the lumbar  spine exacerbate multilevel congenital spinal stenosis appears stable.  Mildly increased attenuation of body wall fat planes, may represent some  degree of generalized volume overload.       Impression:      1. No acute intra-abdominal or pelvic abnormality is identified.  2. Probable mild constipation, no bowel obstruction.  3. Nonspecific increased attenuation and perinephric fat planes  bilaterally, no evidence urinary tract obstruction. There is a  benign-appearing cyst associated with each kidney.  4. Prior cholecystectomy.  5. Multilevel spinal stenosis, exacerbated by degenerative changes,  stable.        This report was finalized on 02/25/2023 02:18 by Dr. Percy Cesar MD.    XR Chest 1 View [[id number]] Collected: 02/24/23 2105     Updated: 02/24/23 2108    Narrative:      EXAMINATION: XR CHEST 1 VW- 2/24/2023 9:05 PM CST     HISTORY: Chest pain.     REPORT: A frontal view of the chest was obtained.     COMPARISON: Chest x-ray 02/02/2023.        The lungs are hypoaerated, no focal infiltrate or pulmonary  consolidation is identified. No pneumothorax or effusion is identified.  Heart size is normal. 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 The osseous  structures show no acute findings. ACDF hardware is present.       Impression:      Shallow inspiration, no acute cardiopulmonary abnormality.     This report was finalized on 02/24/2023 21:05 by Dr. Percy Cesar MD.          I have reviewed the patient's current medications.     Assessment/Plan   Assessment  Active Hospital Problems    Diagnosis    • **WANDER (acute kidney injury) (HCC)        Treatment Plan  Nephrology consult    Medical Decision Making  Number and Complexity of problems:   Acute  kidney injury neurology moderate complexity  Differential Diagnosis: None    Conditions and Status        Condition is unchanged.     [facility] Data  External documents reviewed: None  Cardiac tracing (EKG, telemetry) interpretation: Sinus rhythm  Radiology interpretation: Reviewed  Labs reviewed: Reviewed  Any tests that were considered but not ordered: None     Decision rules/scores evaluated (example [id number], Wells, etc): None     Discussed with: Patient     Care Planning  Shared decision making: Patient  Code status and discussions: Full    Disposition  Social Determinants of Health that impact treatment or disposition: None  I expect the patient to be discharged to home in 1-2 days.     Electronically signed by Alejandrina Barnett, 02/25/23, 12:32 CST.      Electronically signed by Alejandrina Barnett at 02/25/23 1234          Consult Notes (last 7 days)      Brian Lomas MD at 02/25/23 1420          Nephrology ([facility]) Consult Note      Patient:  Erick Luong  YOB: 1955  Date of Service: 2/25/2023  MRN: [id number]   Acct: [id number]   Primary Care Physician: Del Shetty MD  Advance Directive:   Code Status and Medical Interventions:   Ordered at: 02/25/23 0155     Code Status (Patient has no pulse and is not breathing):    CPR (Attempt to Resuscitate)     Medical Interventions (Patient has pulse or is breathing):    Full Support     Admit Date: 2/24/2023       Hospital Day: 0  Referring Provider: Piter Perez MD      Patient Seen, Chart, Consults, Notes, Labs, Radiology studies reviewed.    Chief complaint: Abnormal labs.    Subjective:  Erick Luong is a 67 y.o. male  whom we were consulted for acute kidney injury.  He has history of stage IIIb chronic kidney disease baseline, follows me in the office.  His last estimated GFR is 40 mL.  Patient also has history of morbid abdominal obesity, chronic kidney disease, coronary artery disease, hypertension and  hyperlipidemia.  Patient was directed to go to hospital by his primary care doctor.  As he was found to have fairly abnormal renal labs.  He was also reporting decreasing blood pressure and blood sugar.  Patient denies any nausea vomiting or diarrhea.  His intake of fluid has been very poor.  He denies any use of nonsteroidal agents.  Patient also denies any shortness of breath or swelling of lower extremities.  Incidental finding of the labs showed serum creatinine was 3.5 mg, baseline creatinine usually 1.8 mg.    Allergies:  Cefepime, Bactrim [sulfamethoxazole-trimethoprim], Vancomycin, Zolpidem, Zolpidem tartrate, and Metronidazole    Home Meds:  Medications Prior to Admission   Medication Sig Dispense Refill Last Dose   • amitriptyline (ELAVIL) 25 MG tablet Take 2 tablets by mouth Daily at bedtime. 60 tablet 1 Past Week   • Aspirin 81 MG capsule Take 81 mg by mouth Daily.   Past Week   • Azelastine HCl 137 MCG/SPRAY solution Use 2 sprays into the nostril(s) as directed by provider 2 (Two) Times a Day. 30 mL 3 Past Week   • bumetanide (BUMEX) 2 MG tablet Take 1 tablet by mouth 2 (Two) Times a Day for 30 days. 60 tablet 0 Past Week   • busPIRone (BUSPAR) 10 MG tablet Take 1 tablet by mouth 3 (Three) Times a Day As Needed. 270 tablet 4 Past Week   • calcitriol (ROCALTROL) 0.5 MCG capsule Take 1 capsule by mouth Daily. 90 capsule 4 Past Week   • carvedilol (COREG) 6.25 MG tablet Take 1 tablet by mouth 2 (Two) Times a Day. 180 tablet 4 Past Week   • Cholecalciferol 125 MCG (5000 UT) tablet Take 1 tablet by mouth Daily with meal 30 tablet 2 Past Week   • cloNIDine (CATAPRES) 0.1 MG tablet Take 0.1 mg by mouth Every 12 (Twelve) Hours.   Past Week   • colchicine 0.6 MG tablet Take 0.6 mg by mouth Daily.   Past Week   • donepezil (ARICEPT) 10 MG tablet Take 1 tablet by mouth Daily. 90 tablet 4 Past Week   • Dulaglutide (Trulicity) 3 MG/0.5ML solution pen-injector Inject 3 mg under the skin into the appropriate area as  directed Every 7 (Seven) Days. 4 mL 11 Past Week   • DULoxetine (CYMBALTA) 60 MG capsule Take 1 capsule by mouth Daily. 90 capsule 4 Past Week   • fexofenadine (ALLEGRA) 180 MG tablet Take 1 tablet by mouth Daily. 30 tablet 2 Past Week   • gabapentin (NEURONTIN) 100 MG capsule Take 100 mg by mouth 2 (Two) Times a Day.   Past Week   • insulin aspart (novoLOG FLEXPEN) 100 UNIT/ML solution pen-injector sc pen Inject up to 150 units subcutaneously twice daily according to sliding scale instructions from provider. 90 mL 1 Past Week   • irbesartan (AVAPRO) 150 MG tablet Take 150 mg by mouth Daily.   Past Week   • albuterol sulfate  (90 Base) MCG/ACT inhaler Inhale 2 puffs Every 4 (Four) Hours As Needed for Wheezing. 8 g 1    • Continuous Blood Gluc  (Dexcom G6 ) device Take As Directed. 1 each 2    • Continuous Blood Gluc Sensor (Dexcom G6 Sensor) Use As Directed. 1 each 2    • Continuous Blood Gluc Transmit (Dexcom G6 Transmitter) misc Use As Directed. 1 each 2    • cyclobenzaprine (FLEXERIL) 5 MG tablet Take 1 tablet by mouth 2 (Two) Times a Day As Needed for muscle spasms 60 tablet 5    • HYDROcodone-acetaminophen (NORCO) 7.5-325 MG per tablet Take 1 tablet by mouth 2 (Two) Times a Day As Needed. 45 tablet 0    • Insulin Pen Needle 31G X 5 MM misc Use three times a day. 100 each 11    • Insulin Regular Human, Conc, (HumuLIN R U-500 KwikPen) 500 UNIT/ML solution pen-injector CONCENTRATED injection Inject 120 Units under the skin into the appropriate area as directed 2 (Two) Times a Day with breakfast and dinner. 18 mL 7    • lactulose (CHRONULAC) 10 GM/15ML solution solution (encephalopathy) Take 30 ml by mouth once daily for 1 month 900 mL 0    • losartan (COZAAR) 100 MG tablet losartan 100 mg tablet      • lubiprostone (Amitiza) 24 MCG capsule Take 1 capsule by mouth 2 (Two) Times a Day--Replaces Trulance 60 capsule 0    • methylcellulose (Citrucel) oral powder Take 5 g twice a day by oral route  for 90 days. 900 g 10    • methylcellulose, Laxative, (CITRUCEL) 500 MG tablet tablet 1 tablet Daily.      • metOLazone (ZAROXOLYN) 2.5 MG tablet metolazone 2.5 mg tablet      • midodrine (PROAMATINE) 10 MG tablet midodrine 10 mg tablet      • nebivolol (BYSTOLIC) 5 MG tablet Bystolic 5 mg tablet   TAKE 1 TABLET BY MOUTH EVERY DAY      • ondansetron (Zofran) 4 MG tablet Take 1 tablet by mouth 3 (Three) Times a Day. 15 tablet 1    • pantoprazole (Protonix) 40 MG EC tablet Take 1 tablet by mouth 2 (Two) Times a Day. 180 tablet 4    • Plecanatide (Trulance) 3 MG tablet Take 1 tablet by mouth Daily. 30 tablet 0    • polyethylene glycol (GaviLyte-G) 236 g solution GaviLyte-G 236 gram-22.74 gram-6.74 gram-5.86 gram oral solution      • polyethylene glycol (MIRALAX) 17 GM/SCOOP powder Take 17 g by mouth 2 (Two) Times a Day. 3060 g 4    • polyethylene glycol-electrolytes (NULYTELY) 420 g solution See Admin Instructions.      • prazosin (MINIPRESS) 1 MG capsule Take 1 capsule by mouth Daily at bedtime. 30 capsule 2    • prazosin (MINIPRESS) 2 MG capsule Take 1 capsule by mouth every night at bedtime. 30 capsule 4    • pregabalin (LYRICA) 50 MG capsule Take 1 capsule by mouth 3 (Three) Times a Day. 90 capsule 2    • rosuvastatin (CRESTOR) 40 MG tablet Take 1 tablet by mouth Every Night. 90 tablet 3    • Sodium Phosphates (fleet enema) 7-19 GM/118ML enema Insert 133 mL by rectal route tonight.  If no BM, repeat in the AM. 133 mL 2    • Sodium Phosphates (Fleets) enema enema 133 mL.      • sucralfate (CARAFATE) 1 GM/10ML suspension Take 10 mL by mouth 4 (Four) Times a Day Before Meals & at Bedtime for 30 days. 1200 mL 0    • tamsulosin (Flomax) 0.4 MG capsule 24 hr capsule Take 1 capsule by mouth Daily. 90 capsule 0    • timolol (TIMOPTIC) 0.5 % ophthalmic solution Administer 1 drop to both eyes 2 (Two) Times a Day. 15 mL 3    • traZODone (DESYREL) 100 MG tablet Take 1 tablet by mouth once daily at bedtime 90 tablet 4    • Zinc  Sulfate 220 (50 Zn) MG tablet Take 1 tablet by mouth Daily. 30 each 0        Medicines:  Current Facility-Administered Medications   Medication Dose Route Frequency Provider Last Rate Last Admin   • albuterol (PROVENTIL) nebulizer solution 0.083% 2.5 mg/3mL  2.5 mg Nebulization Q4H PRN Abdoulaye Ames MD       • aspirin chewable tablet 81 mg  81 mg Oral Daily Abdoulaye Ames MD   81 mg at 02/25/23 0835   • carvedilol (COREG) tablet 6.25 mg  6.25 mg Oral BID With Meals Abdoulaye Ames MD   6.25 mg at 02/25/23 0835   • dextrose (D50W) (25 g/50 mL) IV injection 25 g  25 g Intravenous Q15 Min PRN Abdoulaye Ames MD       • dextrose (GLUTOSE) oral gel 15 g  15 g Oral Q15 Min PRN Abdoulaye Ames MD       • donepezil (ARICEPT) tablet 10 mg  10 mg Oral Daily Abdoulaye Ames MD   10 mg at 02/25/23 0835   • glucagon (human recombinant) (GLUCAGEN DIAGNOSTIC) injection 1 mg  1 mg Intramuscular Q15 Min PRN Abdoulaye Ames MD       • HYDROcodone-acetaminophen (NORCO) 7.5-325 MG per tablet 1 tablet  1 tablet Oral BID PRN Abdoulaye Ames MD   1 tablet at 02/25/23 0441   • Insulin Lispro (humaLOG) injection 2-7 Units  2-7 Units Subcutaneous TID AC Abdoulaye Ames MD   4 Units at 02/25/23 0835   • sodium chloride 0.9 % infusion  100 mL/hr Intravenous Continuous Abdoulaye Ames  mL/hr at 02/25/23 1330 100 mL/hr at 02/25/23 1330       Past Medical History:  Past Medical History:   Diagnosis Date   • Arthritis    • Autonomic disease    • CAD (coronary artery disease) 02/06/2017   • Cervical radiculopathy 09/16/2021   • Chronic constipation with acute exaccerbation 05/10/2021   • Coronary artery disease    • Degeneration of cervical intervertebral disc 08/11/2021   • Diabetes mellitus (HCC)    • Diabetic foot ulcer (HCC) 08/31/2020   • Diabetic polyneuropathy associated with type 2 diabetes mellitus ([facility]) 01/18/2021   • Elevated cholesterol    • Gastroesophageal reflux disease 05/13/2019   •  Headache    • HTN (hypertension), benign 05/03/2017   • Hyperlipidemia    • Hypertension    • Mixed hyperlipidemia 02/07/2017   • Multiple lung nodules 01/26/2020    5mm, 9 mm RLL identified 1/2020, not present 10/2019.   • Myocardial infarction (HCC)    • Osteomyelitis (HCC) 01/22/2020   • Osteomyelitis of fifth toe of right foot (HCC) 10/07/2019   • Pancreatitis    • Persistent insomnia 01/20/2020   • Renal disorder    • Sleep apnea 02/06/2017   • Sleep apnea with use of continuous positive airway pressure (CPAP)     NON-COMPLIANT   • Slow transit constipation 01/16/2019   • Spinal stenosis in cervical region 09/16/2021   • Vitamin D deficiency 03/02/2021       Past Surgical History:  Past Surgical History:   Procedure Laterality Date   • ABDOMINAL SURGERY     • AMPUTATION FOOT / TOE Left 10/2021    5th digit    • ANTERIOR CERVICAL DISCECTOMY W/ FUSION N/A 8/5/2022    Procedure: CERVICAL DISCECTOMY ANTERIOR WITH FUSION C5-6 with possible plating of C5-7 with neuromonitoring and 1 c-arm;  Surgeon: Karel Soliz MD;  Location:  [facility] OR;  Service: Neurosurgery;  Laterality: N/A;   • APPENDECTOMY     • BACK SURGERY     • CARDIAC CATHETERIZATION Left 02/08/2021    Procedure: Left Heart Cath w poss intervention left anatomical snuff box acess;  Surgeon: Omkar Charles DO;  Location:  [facility] CATH INVASIVE LOCATION;  Service: Cardiology;  Laterality: Left;   • CARDIAC SURGERY     • CATARACT EXTRACTION     • CERVICAL SPINE SURGERY     • COLONOSCOPY N/A 01/31/2017    Normal exam repeat in 5 years   • COLONOSCOPY N/A 02/11/2019    Mild acute inflammation   • COLONOSCOPY W/ POLYPECTOMY  03/04/2014    Hyperplastic polyp   • CORONARY ARTERY BYPASS GRAFT  10/2015   • ENDOSCOPY  04/13/2011    Gastritis with hemorrhage   • ENDOSCOPY N/A 05/05/2017    Normal exam   • ENDOSCOPY N/A 02/11/2019    Gastritis   • ENDOSCOPY N/A 09/01/2020    Non-erosive gastritis with hemorrhage   • ENDOSCOPY N/A 02/10/2021    Esophagitis  "  • FOOT SURGERY Left    • INCISION AND DRAINAGE OF WOUND Left 2007    spider bite       Family History  Family History   Problem Relation Age of Onset   • Colon cancer Father    • Heart disease Father    • Colon cancer Sister    • Colon polyps Sister    • Alzheimer's disease Mother    • Coronary artery disease Sister    • Coronary artery disease Sister        Social History  Social History     Socioeconomic History   • Marital status:    Tobacco Use   • Smoking status: Former     Types: Cigarettes     Quit date:      Years since quittin.1   • Smokeless tobacco: Never   • Tobacco comments:     smoked in highschool   Vaping Use   • Vaping Use: Never used   Substance and Sexual Activity   • Alcohol use: No   • Drug use: No   • Sexual activity: Defer         Review of Systems:  History obtained from chart review and the patient  General ROS: No fever or chills  Respiratory ROS: No cough, shortness of breath, wheezing  Cardiovascular ROS: no chest pain or dyspnea on exertion  Gastrointestinal ROS: No abdominal pain or melena  Genito-Urinary ROS: No dysuria or hematuria  14 point ROS reviewed with the patient and negative except as noted above and in the HPI unless unable to obtain.    Objective:  /53 (BP Location: Left arm, Patient Position: Sitting)   Pulse 66   Temp 97.6 °F (36.4 °C) (Oral)   Resp 16   Ht 182.9 cm (72\")   Wt (!) 147 kg (325 lb)   SpO2 95%   BMI 44.08 kg/m²     Intake/Output Summary (Last 24 hours) at 2023 1420  Last data filed at 2023 1300  Gross per 24 hour   Intake 0 ml   Output --   Net 0 ml     General: awake/alert    HEENT: Normocephalic atraumatic head  Neck: Supple with no JVD accorded bruits  Chest:  clear to auscultation bilaterally without respiratory distress  CVS: regular rate and rhythm  Abdominal: soft, nontender, normal bowel sounds  Extremities: no cyanosis or edema  Skin: warm and dry without rash      Labs:    Results from last 7 days   Lab " Units 02/24/23  2017 02/22/23  1007   WBC 10*3/mm3 8.54 7.83   HEMOGLOBIN g/dL 11.7* 12.1*   HEMATOCRIT % 36.1* 35.8*   PLATELETS 10*3/mm3 200 214       Results from last 7 days   Lab Units 02/24/23  2017 02/22/23  1007   SODIUM mmol/L 136 140   POTASSIUM mmol/L 4.9 4.2   CHLORIDE mmol/L 96* 101   CO2 mmol/L 27.0 25.6   BUN mg/dL 55* 68*   CREATININE mg/dL 3.22* 3.58*   CALCIUM mg/dL 8.8 8.9   BILIRUBIN mg/dL 0.4 0.4   ALK PHOS U/L 79 72   ALT (SGPT) U/L 11 11   AST (SGOT) U/L 17 13   GLUCOSE mg/dL 392* 69   EGFR mL/min/1.73 20.4* 18.0*         Radiology:   Imaging Results (Last 24 Hours)     Procedure Component Value Units Date/Time    CT Abdomen Pelvis Without Contrast [[id number]] Collected: 02/25/23 0204     Updated: 02/25/23 0221    Narrative:      EXAMINATION: CT ABDOMEN PELVIS WO CONTRAST- 2/25/2023 2:04 AM CST     HISTORY: omar; N17.9-Acute kidney failure, unspecified; R77.8-Other  specified abnormalities of plasma proteins     DOSE: 1475 mGycm (Automatic exposure control technique was implemented  in an effort to keep the radiation dose as low as possible without  compromising image quality)     REPORT: Spiral CT of the abdomen and pelvis was performed without  contrast from the lung bases through the pubic symphysis. Reconstructed  coronal and sagittal images are also reviewed.     Comparison: CT abdomen pelvis 01/19/2023.     Review of lung windows demonstrates normal aeration of the lung bases.  There is heavy calcified plaque within the coronary arteries. Previous  median sternotomy is noted. Cholecystectomy clips are present. Liver and  spleen are homogeneous, evaluation of the solid abdominal organs is  limited without intravenous contrast. The stomach is decompressed. There  is fatty replacement of the pancreas. The adrenal glands are  unremarkable. There is increased attenuation of perirenal fat planes,  nonspecific. There is a benign-appearing exophytic cyst at the  mid/inferior pole the right  kidney, measuring 3.9 cm. This is stable. No  hydronephrosis is identified. There is a small cyst at the inferior pole  the left kidney that measures 1.3 cm. Mild distention of the bladder  without wall thickening. No free fluid or free air is identified. Bowel  loops are normal caliber, moderate stool volume is present. There is  mild fatty infiltration of the inguinal canals. Slightly increased  attenuation of subcutaneous fat planes over the anterior abdominal wall  is unchanged. This may reflect chronic scar tissue. Review of bone  windows shows no acute abnormality. Degenerative changes in the lumbar  spine exacerbate multilevel congenital spinal stenosis appears stable.  Mildly increased attenuation of body wall fat planes, may represent some  degree of generalized volume overload.       Impression:      1. No acute intra-abdominal or pelvic abnormality is identified.  2. Probable mild constipation, no bowel obstruction.  3. Nonspecific increased attenuation and perinephric fat planes  bilaterally, no evidence urinary tract obstruction. There is a  benign-appearing cyst associated with each kidney.  4. Prior cholecystectomy.  5. Multilevel spinal stenosis, exacerbated by degenerative changes,  stable.        This report was finalized on 02/25/2023 02:18 by Dr. Percy Cesar MD.    XR Chest 1 View [[id number]] Collected: 02/24/23 2105     Updated: 02/24/23 2108    Narrative:      EXAMINATION: XR CHEST 1 VW- 2/24/2023 9:05 PM CST     HISTORY: Chest pain.     REPORT: A frontal view of the chest was obtained.     COMPARISON: Chest x-ray 02/02/2023.        The lungs are hypoaerated, no focal infiltrate or pulmonary  consolidation is identified. No pneumothorax or effusion is identified.  Heart size is normal. 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 The osseous  structures show no acute findings. ACDF hardware is present.       Impression:      Shallow inspiration, no acute cardiopulmonary abnormality.     This report  was finalized on 02/24/2023 21:05 by Dr. Percy Cesar MD.          Culture:  No components found for: WOUNDCUL, 3  No components found for: CSFCUL, 3  No components found for: BC, 3  No components found for: URINECUL, 3      Assessment   1.  Acute kidney injury stage II.  2.  Intravascular volume depletion versus ATN.  3.  Stage IIIb chronic kidney disease baseline.  4.  Type II diabetic nephropathy.  5.  Morbid abdominal obesity.  6.  Orthostatic hypotension.  7.  Secondary hyperparathyroidism.    Plan:  1.  Agree to continue IV fluid.  2.  Agree to hold ARB/diuretics.  3.  Urinary electrolytes.  4.  Urinary protein to creatinine ratio.  5.  Continue to monitor renal function.      Thank you for the consult, we appreciate the opportunity to provide care to your patients.  Feel free to contact me if I can be of any further assistance.      Brian Lomas MD  2/25/2023  14:20 CST    Electronically signed by Brian Lomas MD at 02/25/23 1054

## 2023-02-27 NOTE — CASE MANAGEMENT/SOCIAL WORK
Discharge Planning Assessment  [facility]     Patient Name: Erick Luong  MRN: [id number]  Today's Date: 2/27/2023    Admit Date: 2/24/2023        Discharge Needs Assessment     Row Name 02/27/23 1437       Living Environment    People in Home child(julia), adult;spouse    Current Living Arrangements home    Potentially Unsafe Housing Conditions none    Provides Primary Care For no one    Family Caregiver if Needed child(julia), adult;spouse    Quality of Family Relationships helpful;involved;supportive    Able to Return to Prior Arrangements yes       Resource/Environmental Concerns    Resource/Environmental Concerns none    Transportation Concerns none       Food Insecurity    Within the past 12 months, you worried that your food would run out before you got the money to buy more. Never true    Within the past 12 months, the food you bought just didn't last and you didn't have money to get more. Never true       Transition Planning    Patient/Family Anticipates Transition to home with family    Patient/Family Anticipated Services at Transition none    Transportation Anticipated family or friend will provide       Discharge Needs Assessment    Readmission Within the Last 30 Days current reason for admission unrelated to previous admission    Equipment Currently Used at Home bp cuff;glucometer    Concerns to be Addressed no discharge needs identified;denies needs/concerns at this time    Anticipated Changes Related to Illness none    Equipment Needed After Discharge other (see comments)  Would like a prescription for a continuous glucose monitor    Discharge Coordination/Progress Spoke with pt at the bedside for DC planning.  Pt has a PCP and Rx coverage.  Denies the need for any DME or HH at this time.  Is asking for a Rx for a continuous glucose monitor.  Will follow for DC needs.               Discharge Plan    No documentation.               Continued Care and Services - Admitted Since 2/24/2023    Coordination has  not been started for this encounter.     Selected Continued Care - Episodes Includes continued care and service providers with selected services from the active episodes listed below    Rising Risk Care Management Episode start date: 2/27/2023   There are no active outsourced providers for this episode.               Expected Discharge Date and Time     Expected Discharge Date Expected Discharge Time    Feb 27, 2023          Demographic Summary    No documentation.                Functional Status    No documentation.                Psychosocial    No documentation.                Abuse/Neglect    No documentation.                Legal    No documentation.                Substance Abuse    No documentation.                Patient Forms    No documentation.                   Prema Jose RN

## 2023-02-27 NOTE — PLAN OF CARE
Goal Outcome Evaluation:  Plan of Care Reviewed With: patient        Progress: improving  Outcome Evaluation: VSS. C/o pain x 1, medicated with PRN pain meds with relief noted. IVF continue to infuse. BUN 44 CREAT 2.56 this AM. NSR/SB on tele. Will continue to monitor.

## 2023-02-27 NOTE — PROGRESS NOTES
Nephrology ([facility]) Progress Note      Patient:  Erick Luong  YOB: 1955  Date of Service: 2/27/2023  MRN: [id number]   Acct: [id number]   Primary Care Physician: Del Shetty MD  Advance Directive:   Code Status and Medical Interventions:   Ordered at: 02/25/23 0155     Code Status (Patient has no pulse and is not breathing):    CPR (Attempt to Resuscitate)     Medical Interventions (Patient has pulse or is breathing):    Full Support     Admit Date: 2/24/2023       Hospital Day: 2  Referring Provider: Piter Perez MD      Patient personally seen and examined.  Complete chart including Consults, Notes, Operative Reports, Labs, Cardiology, and Radiology studies reviewed as able.        Subjective:  Erick Luong is a 67 y.o. male for whom we were consulted for evaluation and treatment of acute kidney injury. Baseline CKD stage 3b, baseline creatinine 1.8. history of obesity, poorly controlled type 2 diabetes, hypertension, coronary artery disease.  Sent to hospital by PCP for abnormal labs. Initial creatinine in ER was 3.2.  Patient had been having poor PO intake recently. Denies changes in urination, denies dyspnea or chest pain, no n/v/d. Hospital course remarkable for improving renal function with IV fluids    Today is feeling better. Hopeful for discharge soon.  Urine output nonoliguric    Allergies:  Cefepime, Bactrim [sulfamethoxazole-trimethoprim], Vancomycin, Zolpidem, Zolpidem tartrate, and Metronidazole    Home Meds:  Medications Prior to Admission   Medication Sig Dispense Refill Last Dose   • amitriptyline (ELAVIL) 25 MG tablet Take 2 tablets by mouth Daily at bedtime. 60 tablet 1 Past Week   • Aspirin 81 MG capsule Take 81 mg by mouth Daily.   Past Week   • Azelastine HCl 137 MCG/SPRAY solution Use 2 sprays into the nostril(s) as directed by provider 2 (Two) Times a Day. 30 mL 3 Past Week   • bumetanide (BUMEX) 2 MG tablet Take 1 tablet by mouth 2  (Two) Times a Day for 30 days. 60 tablet 0 Past Week   • busPIRone (BUSPAR) 10 MG tablet Take 1 tablet by mouth 3 (Three) Times a Day As Needed. 270 tablet 4 Past Week   • calcitriol (ROCALTROL) 0.5 MCG capsule Take 1 capsule by mouth Daily. 90 capsule 4 Past Week   • carvedilol (COREG) 6.25 MG tablet Take 1 tablet by mouth 2 (Two) Times a Day. 180 tablet 4 Past Week   • Cholecalciferol 125 MCG (5000 UT) tablet Take 1 tablet by mouth Daily with meal 30 tablet 2 Past Week   • cloNIDine (CATAPRES) 0.1 MG tablet Take 0.1 mg by mouth Every 12 (Twelve) Hours.   Past Week   • colchicine 0.6 MG tablet Take 0.6 mg by mouth Daily.   Past Week   • donepezil (ARICEPT) 10 MG tablet Take 1 tablet by mouth Daily. 90 tablet 4 Past Week   • Dulaglutide (Trulicity) 3 MG/0.5ML solution pen-injector Inject 3 mg under the skin into the appropriate area as directed Every 7 (Seven) Days. 4 mL 11 Past Week   • DULoxetine (CYMBALTA) 60 MG capsule Take 1 capsule by mouth Daily. 90 capsule 4 Past Week   • fexofenadine (ALLEGRA) 180 MG tablet Take 1 tablet by mouth Daily. 30 tablet 2 Past Week   • gabapentin (NEURONTIN) 100 MG capsule Take 100 mg by mouth 2 (Two) Times a Day.   Past Week   • insulin aspart (novoLOG FLEXPEN) 100 UNIT/ML solution pen-injector sc pen Inject up to 150 units subcutaneously twice daily according to sliding scale instructions from provider. 90 mL 1 Past Week   • irbesartan (AVAPRO) 150 MG tablet Take 150 mg by mouth Daily.   Past Week   • albuterol sulfate  (90 Base) MCG/ACT inhaler Inhale 2 puffs Every 4 (Four) Hours As Needed for Wheezing. 8 g 1    • Continuous Blood Gluc  (Dexcom G6 ) device Take As Directed. 1 each 2    • Continuous Blood Gluc Sensor (Dexcom G6 Sensor) Use As Directed. 1 each 2    • Continuous Blood Gluc Transmit (Dexcom G6 Transmitter) misc Use As Directed. 1 each 2    • cyclobenzaprine (FLEXERIL) 5 MG tablet Take 1 tablet by mouth 2 (Two) Times a Day As Needed for muscle  spasms 60 tablet 5    • HYDROcodone-acetaminophen (NORCO) 7.5-325 MG per tablet Take 1 tablet by mouth 2 (Two) Times a Day As Needed. 45 tablet 0    • Insulin Pen Needle 31G X 5 MM misc Use three times a day. 100 each 11    • Insulin Regular Human, Conc, (HumuLIN R U-500 KwikPen) 500 UNIT/ML solution pen-injector CONCENTRATED injection Inject 120 Units under the skin into the appropriate area as directed 2 (Two) Times a Day with breakfast and dinner. 18 mL 7    • lactulose (CHRONULAC) 10 GM/15ML solution solution (encephalopathy) Take 30 ml by mouth once daily for 1 month 900 mL 0    • losartan (COZAAR) 100 MG tablet losartan 100 mg tablet      • lubiprostone (Amitiza) 24 MCG capsule Take 1 capsule by mouth 2 (Two) Times a Day--Replaces Trulance 60 capsule 0    • methylcellulose (Citrucel) oral powder Take 5 g twice a day by oral route for 90 days. 900 g 10    • methylcellulose, Laxative, (CITRUCEL) 500 MG tablet tablet 1 tablet Daily.      • metOLazone (ZAROXOLYN) 2.5 MG tablet metolazone 2.5 mg tablet      • midodrine (PROAMATINE) 10 MG tablet midodrine 10 mg tablet      • nebivolol (BYSTOLIC) 5 MG tablet Bystolic 5 mg tablet   TAKE 1 TABLET BY MOUTH EVERY DAY      • ondansetron (Zofran) 4 MG tablet Take 1 tablet by mouth 3 (Three) Times a Day. 15 tablet 1    • pantoprazole (Protonix) 40 MG EC tablet Take 1 tablet by mouth 2 (Two) Times a Day. 180 tablet 4    • Plecanatide (Trulance) 3 MG tablet Take 1 tablet by mouth Daily. 30 tablet 0    • polyethylene glycol (GaviLyte-G) 236 g solution GaviLyte-G 236 gram-22.74 gram-6.74 gram-5.86 gram oral solution      • polyethylene glycol (MIRALAX) 17 GM/SCOOP powder Take 17 g by mouth 2 (Two) Times a Day. 3060 g 4    • polyethylene glycol-electrolytes (NULYTELY) 420 g solution See Admin Instructions.      • prazosin (MINIPRESS) 1 MG capsule Take 1 capsule by mouth Daily at bedtime. 30 capsule 2    • prazosin (MINIPRESS) 2 MG capsule Take 1 capsule by mouth every night at  bedtime. 30 capsule 4    • pregabalin (LYRICA) 50 MG capsule Take 1 capsule by mouth 3 (Three) Times a Day. 90 capsule 2    • rosuvastatin (CRESTOR) 40 MG tablet Take 1 tablet by mouth Every Night. 90 tablet 3    • Sodium Phosphates (fleet enema) 7-19 GM/118ML enema Insert 133 mL by rectal route tonight.  If no BM, repeat in the AM. 133 mL 2    • Sodium Phosphates (Fleets) enema enema 133 mL.      • [] sucralfate (CARAFATE) 1 GM/10ML suspension Take 10 mL by mouth 4 (Four) Times a Day Before Meals & at Bedtime for 30 days. 1200 mL 0    • tamsulosin (Flomax) 0.4 MG capsule 24 hr capsule Take 1 capsule by mouth Daily. 90 capsule 0    • timolol (TIMOPTIC) 0.5 % ophthalmic solution Administer 1 drop to both eyes 2 (Two) Times a Day. 15 mL 3    • traZODone (DESYREL) 100 MG tablet Take 1 tablet by mouth once daily at bedtime 90 tablet 4    • Zinc Sulfate 220 (50 Zn) MG tablet Take 1 tablet by mouth Daily. 30 each 0        Medicines:  Current Facility-Administered Medications   Medication Dose Route Frequency Provider Last Rate Last Admin   • acetaminophen (TYLENOL) tablet 650 mg  650 mg Oral Q4H PRN Abdoulaye Ames MD   650 mg at 23   • albuterol (PROVENTIL) nebulizer solution 0.083% 2.5 mg/3mL  2.5 mg Nebulization Q4H PRN Abdoulaye Ames MD       • amitriptyline (ELAVIL) tablet 50 mg  50 mg Oral Nightly Abdoulaye Ames MD   50 mg at 23   • aspirin chewable tablet 81 mg  81 mg Oral Daily Abdoulaye Ames MD   81 mg at 23   • butalbital-acetaminophen-caffeine (FIORICET, ESGIC) -40 MG per tablet 2 tablet  2 tablet Oral Q4H PRN Abdoulaye Ames MD   2 tablet at 23   • carvedilol (COREG) tablet 6.25 mg  6.25 mg Oral BID With Meals Abdoulaye Ames MD   6.25 mg at 23 0837   • cetirizine (zyrTEC) tablet 10 mg  10 mg Oral Daily Abdoulaye Ames MD   10 mg at 23 0838   • cyclobenzaprine (FLEXERIL) tablet 5 mg  5 mg Oral BID PRN Hui  Abdoulaye DONATO MD   5 mg at 02/26/23 1315   • dextrose (D50W) (25 g/50 mL) IV injection 25 g  25 g Intravenous Q15 Min PRN Abdoulaye Ames MD       • dextrose (GLUTOSE) oral gel 15 g  15 g Oral Q15 Min PRN Abdoulaye Ames MD       • donepezil (ARICEPT) tablet 10 mg  10 mg Oral Daily Abdoulaye Ames MD   10 mg at 02/27/23 0837   • glucagon (human recombinant) (GLUCAGEN DIAGNOSTIC) injection 1 mg  1 mg Intramuscular Q15 Min PRN Abdoulaye Ames MD       • HYDROcodone-acetaminophen (NORCO) 7.5-325 MG per tablet 1 tablet  1 tablet Oral Q6H PRN Abdoulaye Ames MD   1 tablet at 02/27/23 0837   • Insulin Lispro (humaLOG) injection 2-7 Units  2-7 Units Subcutaneous TID AC Abdoulaye Ames MD   3 Units at 02/27/23 0838   • metoclopramide (REGLAN) injection 5 mg  5 mg Intravenous Q6H Alejandrina Barnett   5 mg at 02/27/23 0608   • midodrine (PROAMATINE) tablet 2.5 mg  2.5 mg Oral TID AC Brian Lomas MD   2.5 mg at 02/27/23 0836   • ondansetron (ZOFRAN) injection 4 mg  4 mg Intravenous Q6H PRN Abdoulaye Ames MD   4 mg at 02/26/23 1704   • pregabalin (LYRICA) capsule 50 mg  50 mg Oral TID Abdoulaye Ames MD   50 mg at 02/27/23 0837   • sodium chloride 0.9 % infusion  100 mL/hr Intravenous Continuous Abdoulaye Ames  mL/hr at 02/27/23 0838 100 mL/hr at 02/27/23 0838   • tamsulosin (FLOMAX) 24 hr capsule 0.4 mg  0.4 mg Oral Daily Abdoulaye Ames MD   0.4 mg at 02/27/23 0836   • terazosin (HYTRIN) capsule 5 mg  5 mg Oral Nightly Abdoulaye Ames MD   5 mg at 02/26/23 2017   • traZODone (DESYREL) tablet 100 mg  100 mg Oral Nightly PRN Abdoulaye Ames MD   100 mg at 02/26/23 0014       Past Medical History:  Past Medical History:   Diagnosis Date   • Arthritis    • Autonomic disease    • CAD (coronary artery disease) 02/06/2017   • Cervical radiculopathy 09/16/2021   • Chronic constipation with acute exaccerbation 05/10/2021   • Coronary artery disease    • Degeneration of cervical  intervertebral disc 08/11/2021   • Diabetes mellitus (HCC)    • Diabetic foot ulcer (HCC) 08/31/2020   • Diabetic polyneuropathy associated with type 2 diabetes mellitus (HCC) 01/18/2021   • Elevated cholesterol    • Gastroesophageal reflux disease 05/13/2019   • Headache    • HTN (hypertension), benign 05/03/2017   • Hyperlipidemia    • Hypertension    • Mixed hyperlipidemia 02/07/2017   • Multiple lung nodules 01/26/2020    5mm, 9 mm RLL identified 1/2020, not present 10/2019.   • Myocardial infarction (HCC)    • Osteomyelitis (HCC) 01/22/2020   • Osteomyelitis of fifth toe of right foot (HCC) 10/07/2019   • Pancreatitis    • Persistent insomnia 01/20/2020   • Renal disorder    • Sleep apnea 02/06/2017   • Sleep apnea with use of continuous positive airway pressure (CPAP)     NON-COMPLIANT   • Slow transit constipation 01/16/2019   • Spinal stenosis in cervical region 09/16/2021   • Vitamin D deficiency 03/02/2021       Past Surgical History:  Past Surgical History:   Procedure Laterality Date   • ABDOMINAL SURGERY     • AMPUTATION FOOT / TOE Left 10/2021    5th digit    • ANTERIOR CERVICAL DISCECTOMY W/ FUSION N/A 8/5/2022    Procedure: CERVICAL DISCECTOMY ANTERIOR WITH FUSION C5-6 with possible plating of C5-7 with neuromonitoring and 1 c-arm;  Surgeon: Karel Soliz MD;  Location: [facility] OR;  Service: Neurosurgery;  Laterality: N/A;   • APPENDECTOMY     • BACK SURGERY     • CARDIAC CATHETERIZATION Left 02/08/2021    Procedure: Left Heart Cath w poss intervention left anatomical snuff box acess;  Surgeon: Omkar Charles DO;  Location:  [facility]ATH IN[facility]ASIVE [facility]ATION;  Service: Cardiology;  Laterality: Left;   • CARDIAC SURGERY     • CATARACT EXTRACTION     • CERVICAL SPINE SURGERY     • COLONOSCOPY N/A 01/31/2017    Normal exam repeat in 5 years   • COLONOSCOPY N/A 02/11/2019    Mild acute inflammation   • COLONOSCOPY W/ POLYPECTOMY  03/04/2014    Hyperplastic polyp   • CORONARY ARTERY BYPASS GRAFT   10/2015   • ENDOSCOPY  2011    Gastritis with hemorrhage   • ENDOSCOPY N/A 2017    Normal exam   • ENDOSCOPY N/A 2019    Gastritis   • ENDOSCOPY N/A 2020    Non-erosive gastritis with hemorrhage   • ENDOSCOPY N/A 02/10/2021    Esophagitis   • FOOT SURGERY Left    • INCISION AND DRAINAGE OF WOUND Left 2007    spider bite       Family History  Family History   Problem Relation Age of Onset   • Colon cancer Father    • Heart disease Father    • Colon cancer Sister    • Colon polyps Sister    • Alzheimer's disease Mother    • Coronary artery disease Sister    • Coronary artery disease Sister        Social History  Social History     Socioeconomic History   • Marital status:    Tobacco Use   • Smoking status: Former     Types: Cigarettes     Quit date:      Years since quittin.1   • Smokeless tobacco: Never   • Tobacco comments:     smoked in [place]   Vaping Use   • Vaping Use: Never used   Substance and Sexual Activity   • Alcohol use: No   • Drug use: No   • Sexual activity: Defer       Review of Systems:  History obtained from chart review and the patient  General ROS: No fever or chills  Respiratory ROS: No cough, shortness of breath, wheezing  Cardiovascular ROS: No chest pain or palpitations  Gastrointestinal ROS: No abdominal pain or melena  Genito-Urinary ROS: No dysuria or hematuria  Psych ROS: No anxiety and depression  14 point ROS reviewed with the patient and negative except as noted above and in the HPI unless unable to obtain.    Objective:  Patient Vitals for the past 24 hrs:   BP Temp Temp src Pulse Resp SpO2 Weight   23 0801 143/66 97.3 °F (36.3 °C) Oral 71 18 95 % --   23 0402 127/56 97.4 °F (36.3 °C) Oral 61 18 97 % (!) 149 kg (328 lb 12.8 oz)   23 2357 139/60 97.4 °F (36.3 °C) Oral 59 18 97 % --   23 135/56 98.1 °F (36.7 °C) Oral 60 18 100 % --   23 1705 162/65 -- -- -- -- -- --   23 1700 150/58 -- -- -- -- -- --    02/26/23 1530 127/50 98 °F (36.7 °C) Oral 56 16 97 % --   02/26/23 1139 139/71 98.3 °F (36.8 °C) Oral 54 15 97 % --       Intake/Output Summary (Last 24 hours) at 2/27/2023 1118  Last data filed at 2/27/2023 0941  Gross per 24 hour   Intake 480 ml   Output 1125 ml   Net -645 ml     General: awake/alert   Chest:  clear to auscultation bilaterally without respiratory distress  CVS: regular rate and rhythm  Abdominal: soft, nontender, positive bowel sounds  Extremities: no cyanosis or edema  Skin: warm and dry without rash      Labs:  Results from last 7 days   Lab Units 02/27/23 0416 02/26/23 0547 02/24/23 2017   WBC 10*3/mm3 6.87 6.47 8.54   HEMOGLOBIN g/dL 11.4* 11.5* 11.7*   HEMATOCRIT % 37.2* 36.2* 36.1*   PLATELETS 10*3/mm3 186 191 200         Results from last 7 days   Lab Units 02/27/23 0415 02/26/23  0547 02/24/23 2017 02/22/23  1007   SODIUM mmol/L 140 137 136 140   POTASSIUM mmol/L 5.1 4.9 4.9 4.2   CHLORIDE mmol/L 106 104 96* 101   CO2 mmol/L 23.0 23.0 27.0 25.6   BUN mg/dL 44* 49* 55* 68*   CREATININE mg/dL 2.56* 2.82* 3.22* 3.58*   CALCIUM mg/dL 8.4* 8.5* 8.8 8.9   EGFR mL/min/1.73 26.9* 23.9* 20.4* 18.0*   BILIRUBIN mg/dL 0.4  --  0.4 0.4   ALK PHOS U/L 80  --  79 72   ALT (SGPT) U/L 11  --  11 11   AST (SGOT) U/L 17  --  17 13   GLUCOSE mg/dL 218* 250* 392* 69       Radiology:   Imaging Results (Last 72 Hours)     Procedure Component Value Units Date/Time    CT Abdomen Pelvis Without Contrast [[id number]] Collected: 02/25/23 0204     Updated: 02/25/23 0221    Narrative:      EXAMINATION: CT ABDOMEN PELVIS WO CONTRAST- 2/25/2023 2:04 AM CST     HISTORY: omar; N17.9-Acute kidney failure, unspecified; R77.8-Other  specified abnormalities of plasma proteins     DOSE: 1475 mGycm (Automatic exposure control technique was implemented  in an effort to keep the radiation dose as low as possible without  compromising image quality)     REPORT: Spiral CT of the abdomen and pelvis was performed  without  contrast from the lung bases through the pubic symphysis. Reconstructed  coronal and sagittal images are also reviewed.     Comparison: CT abdomen pelvis 01/19/2023.     Review of lung windows demonstrates normal aeration of the lung bases.  There is heavy calcified plaque within the coronary arteries. Previous  median sternotomy is noted. Cholecystectomy clips are present. Liver and  spleen are homogeneous, evaluation of the solid abdominal organs is  limited without intravenous contrast. The stomach is decompressed. There  is fatty replacement of the pancreas. The adrenal glands are  unremarkable. There is increased attenuation of perirenal fat planes,  nonspecific. There is a benign-appearing exophytic cyst at the  mid/inferior pole the right kidney, measuring 3.9 cm. This is stable. No  hydronephrosis is identified. There is a small cyst at the inferior pole  the left kidney that measures 1.3 cm. Mild distention of the bladder  without wall thickening. No free fluid or free air is identified. Bowel  loops are normal caliber, moderate stool volume is present. There is  mild fatty infiltration of the inguinal canals. Slightly increased  attenuation of subcutaneous fat planes over the anterior abdominal wall  is unchanged. This may reflect chronic scar tissue. Review of bone  windows shows no acute abnormality. Degenerative changes in the lumbar  spine exacerbate multilevel congenital spinal stenosis appears stable.  Mildly increased attenuation of body wall fat planes, may represent some  degree of generalized volume overload.       Impression:      1. No acute intra-abdominal or pelvic abnormality is identified.  2. Probable mild constipation, no bowel obstruction.  3. Nonspecific increased attenuation and perinephric fat planes  bilaterally, no evidence urinary tract obstruction. There is a  benign-appearing cyst associated with each kidney.  4. Prior cholecystectomy.  5. Multilevel spinal stenosis,  exacerbated by degenerative changes,  stable.        This report was finalized on 02/25/2023 02:18 by Dr. Percy Cesar MD.    XR Chest 1 View [[id number]] Collected: 02/24/23 2105     Updated: 02/24/23 2108    Narrative:      EXAMINATION: XR CHEST 1 VW- 2/24/2023 9:05 PM CST     HISTORY: Chest pain.     REPORT: A frontal view of the chest was obtained.     COMPARISON: Chest x-ray 02/02/2023.        The lungs are hypoaerated, no focal infiltrate or pulmonary  consolidation is identified. No pneumothorax or effusion is identified.  Heart size is normal. 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 The osseous  structures show no acute findings. ACDF hardware is present.       Impression:      Shallow inspiration, no acute cardiopulmonary abnormality.     This report was finalized on 02/24/2023 21:05 by Dr. Percy Cesar MD.          Culture:  No results found for: BLOODCX, URINECX, WOUNDCX, MRSACX, RESPCX, STOOLCX      Assessment   1.  Acute kidney injury, prerenal--improving  2.  Baseline chronic kidney disease stage 3b  3.  Type 2 diabetes   4.  Hypertension  5.  Obesity  6.  Secondary hyperparathyroidism    Plan:  1.  Hold IV fluids  2.  Encouraged patient to maintain daily water intake  3.  OK for discharge from renal standpoint with close outpatient follow up in office in 1 week      Stewart Alvarez, SUSAN  2/27/2023  11:18 CST

## 2023-02-27 NOTE — PLAN OF CARE
Goal Outcome Evaluation:  Plan of Care Reviewed With: patient        Progress: improving  Outcome Evaluation: kidney function is improving per Nephrologist. Possible DC tomorrow. Patient and wife believe glucose medication that is being managed by Dr. Gifford is not appropriatet. RN informed them that they need to notify the physician in charge of this and discuss treatment options.

## 2023-02-28 NOTE — PAYOR COMM NOTE
"REF:  [id number]    [facility]  IVAN,   [phone]  OR  FAX  [phone]       Erick Luong (67 y.o. Male)     Date of Birth   1955    Social Security Number       Address   [address] [place] [address]    Home Phone   [phone]    MRN   [id number]       Sabianism   [facility]    Marital Status                               Admission Date   2/24/23    Admission Type   Emergency    Admitting Provider   Payam Keyes DO    Attending Provider       Department, Room/Bed   [facility] 4B, 444/1       Discharge Date   2/27/2023    Discharge Disposition   Home or Self Care    Discharge Destination                               Attending Provider: (none)   Allergies: Cefepime, Bactrim [Sulfamethoxazole-trimethoprim], Vancomycin, Zolpidem, Zolpidem Tartrate, Metronidazole    Isolation: None   Infection: MRSA (05/19/19), COVID (History) (08/08/22)   Code Status: Prior    Ht: 182.9 cm (72\")   Wt: 150 kg (330 lb 9.6 oz)    Admission Cmt: None   Principal Problem: WANDER (acute kidney injury) (HCC) [N17.9]                 Active Insurance as of 2/24/2023     Primary Coverage     Payor Plan Insurance Group Employer/Plan Group    [employer] [facility] [employer] EMPLOYEE [id number]     Payor Plan Address Payor Plan Phone Number Payor Plan Fax Number Effective Dates    [address] [phone]  1/1/2022 - None Entered    [facility] [id number]       Subscriber Name Subscriber Birth Date Member ID       ZAINAB LUONG MARTINA 11/27/1969 [id number]           Secondary Coverage     Payor Plan Insurance Group Employer/Plan Group    MEDICARE MEDICARE A & B      Payor Plan Address Payor Plan Phone Number Payor Plan Fax Number Effective Dates    [address] [phone]  7/1/2013 - None Entered    [facility] [id number]       Subscriber Name Subscriber Birth Date Member ID       ERICK LUONG 1955 [id number]                 Emergency Contacts      (Rel.) Home Phone " Work Phone Mobile Phone    Joan Luong (Spouse) [phone]               Discharge Summary      Wali Payamdelma CHESTER DO at 02/27/23 1901              [facility]  DISCHARGE SUMMARY       Date of Admission: 2/24/2023  Date of Discharge:  2/27/2023  Primary Care Physician: Del Shetty MD    Discharge Diagnoses:  Active Hospital Problems    Diagnosis    • **WANDER (acute kidney injury) (HCC)    • Stage 3b chronic kidney disease (HCC)    • Type 2 diabetes mellitus with hyperglycemia, with long-term current use of insulin (HCC)    • Obesity, unspecified obesity severity, unspecified obesity type          Presenting Problem/History of Present Illness:  WANDER (acute kidney injury) (HCC) [N17.9]  Elevated troponin [R77.8]     Chief Complaint on Day of Discharge:   No complaint    History of Present Illness on Day of Discharge:   Renal function is significantly improved.  Creatinine is 2.56 today.  The patient has been seen and evaluated by nephrology and given the improvement in the patient's overall renal function, Dr. Arteaga believes that the patient is appropriate for discharge today with follow-up with nephrology next week.  The patient will resume diuretic therapy with Bumex 1 mg daily on 3/2/2023.    Hospital Course  Patient is a 67-year-old male with medical history of obesity, CKD, CAD, hypertension and diabetes, presenting with complaints of abnormal labs.  Patient reports he was called by his PCP about his abnormal renal function and was recommended to present to the emergency room for evaluation.  Patient reports no changes in his urinary habits.  Reports no chest pain, shortness of breath or any other issues.     Treatment Plan  The patient will be admitted to my service here at [facility].  -Admit observation  -IV gentle hydration  - Continue holding Bumex  - Hold ACE inhibitor's  - I reviewed the rest of his home  medications, restart those appropriate for management of his other comorbid condition  - Consult nephrology for eval recommendation    On the morning after admission, the patient was without complaint.  He was noted to have been sent to the hospital by his primary care physician secondary to abnormal renal function lab work.  Nephrology was consulted as a result.  Nephrology saw and evaluated the patient and agreed with continuation of IV fluids and withholding ARB and diuretics.  Urinary electrolytes and protein to creatinine ratio was obtained.  Renal function was monitored throughout his hospital stay with gradual improvement throughout.  He remained on IV fluids until the day of discharge on 2/27/2023.  Creatinine was 2.56 on that day.  Dr. Arteaga saw and evaluated the patient noting that the patient would require resumption of diuretics soon and would like to have the patient follow-up in 1 week after discharge with SUSAN Cuellar.  ARB and diuretics will be held at discharge as well.    Consults:   Nephrology:  Assessment   1.  Acute kidney injury stage II.  2.  Intravascular volume depletion versus ATN.  3.  Stage IIIb chronic kidney disease baseline.  4.  Type II diabetic nephropathy.  5.  Morbid abdominal obesity.  6.  Orthostatic hypotension.  7.  Secondary hyperparathyroidism.     Plan:  1.  Agree to continue IV fluid.  2.  Agree to hold ARB/diuretics.  3.  Urinary electrolytes.  4.  Urinary protein to creatinine ratio.  5.  Continue to monitor renal function.        Thank you for the consult, we appreciate the opportunity to provide care to your patients.  Feel free to contact me if I can be of any further assistance.       Brian Lomas MD      Result Review    Result Review:  I have personally reviewed the results from the time of this admission to 2/27/2023 19:01 CST and agree with these findings:  []  Laboratory  []  Microbiology  []  Radiology  []  EKG/Telemetry   []  Cardiology/Vascular   []  " Pathology  []  Old records  []  Other:      Condition on Discharge:    Stable    Physical Exam on Discharge:  /71 (BP Location: Left arm, Patient Position: Sitting)   Pulse 56   Temp 98 °F (36.7 °C) (Oral)   Resp 18   Ht 182.9 cm (72\")   Wt (!) 149 kg (328 lb 12.8 oz)   SpO2 99%   BMI 44.59 kg/m²   Physical Exam     Constitutional:       Appearance: He is morbidly obese.   HENT:      Head: Normocephalic and atraumatic.      Right Ear: External ear normal.      Left Ear: External ear normal.      Nose: Nose normal.      Mouth: Mucous membranes are moist.   Eyes:      Extraocular Movements: Extraocular movements intact.      Conjunctiva/sclera: Conjunctivae normal.    Cardiovascular:      Rate and Rhythm: Normal rate and regular rhythm.      Pulses: Normal pulses.      Heart sounds: No murmur heard.  Pulmonary:      Effort: Pulmonary effort is normal.      Breath sounds: Normal breath sounds.   Abdominal:      General: Bowel sounds are normal.      Palpations: Abdomen is soft.   Musculoskeletal:         General: Normal range of motion.      Cervical back: Normal range of motion and neck supple.   Skin:     General: Skin is warm and dry.   Neurological:      General: No focal deficit present.      Mental Status: He is alert and oriented to person, place, and time.   Psychiatric:         Mood and Affect: Mood normal.         Behavior: Behavior normal.     Discharge Disposition:  Home or Self Care    Discharge Medications:     Discharge Medications      New Medications      Instructions Start Date   terazosin 5 MG capsule  Commonly known as: HYTRIN  Replaces: prazosin 2 MG capsule   5 mg, Oral, Nightly         Changes to Medications      Instructions Start Date   bumetanide 2 MG tablet  Commonly known as: BUMEX  What changed:   · when to take this  · These instructions start on March 2, 2023. If you are unsure what to do until then, ask your doctor or other care provider.   2 mg, Oral, Daily   Start Date: " March 2, 2023     midodrine 2.5 MG tablet  Commonly known as: PROAMATINE  What changed:   · medication strength  · See the new instructions.   2.5 mg, Oral, 3 Times Daily Before Meals   Start Date: February 28, 2023        Continue These Medications      Instructions Start Date   24HR Allergy Relief 180 MG tablet  Generic drug: fexofenadine   180 mg, Oral, Daily      albuterol sulfate  (90 Base) MCG/ACT inhaler  Commonly known as: PROVENTIL HFA;VENTOLIN HFA;PROAIR HFA   2 puffs, Inhalation, Every 4 Hours PRN      amitriptyline 25 MG tablet  Commonly known as: ELAVIL   Take 2 tablets by mouth Daily at bedtime.      Aspirin 81 MG capsule   81 mg, Oral, Every 24 Hours      Azelastine HCl 137 MCG/SPRAY solution   Use 2 sprays into the nostril(s) as directed by provider 2 (Two) Times a Day.      busPIRone 10 MG tablet  Commonly known as: BUSPAR   10 mg, Oral, 3 Times Daily PRN      calcitriol 0.5 MCG capsule  Commonly known as: ROCALTROL   0.5 mcg, Oral, Daily      carvedilol 6.25 MG tablet  Commonly known as: COREG   6.25 mg, Oral, 2 Times Daily      colchicine 0.6 MG tablet   0.6 mg, Oral, Daily      cyclobenzaprine 5 MG tablet  Commonly known as: FLEXERIL   Take 1 tablet by mouth 2 (Two) Times a Day As Needed for muscle spasms      Dexcom G6  device   Take As Directed      Dexcom G6 Sensor   Use As Directed.      Dexcom G6 Transmitter misc   Use As Directed.      donepezil 10 MG tablet  Commonly known as: ARICEPT   10 mg, Oral, Daily      DULoxetine 60 MG capsule  Commonly known as: CYMBALTA   60 mg, Oral, Daily      gabapentin 100 MG capsule  Commonly known as: NEURONTIN   100 mg, Oral, 2 Times Daily      GaviLyte-G 236 g solution  Generic drug: polyethylene glycol   GaviLyte-G 236 gram-22.74 gram-6.74 gram-5.86 gram oral solution      HumuLIN R U-500 KwikPen 500 UNIT/ML solution pen-injector CONCENTRATED injection  Generic drug: Insulin Regular Human (Conc)   Inject 120 Units under the skin into the  appropriate area as directed 2 (Two) Times a Day with breakfast and dinner.      HYDROcodone-acetaminophen 7.5-325 MG per tablet  Commonly known as: NORCO   1 tablet, Oral, 2 Times Daily PRN      lubiprostone 24 MCG capsule  Commonly known as: Amitiza   Take 1 capsule by mouth 2 (Two) Times a Day--Replaces Trulance      nebivolol 5 MG tablet  Commonly known as: BYSTOLIC   Bystolic 5 mg tablet   TAKE 1 TABLET BY MOUTH EVERY DAY      NovoLOG FlexPen 100 UNIT/ML solution pen-injector sc pen  Generic drug: insulin aspart   Inject up to 150 units subcutaneously twice daily according to sliding scale instructions from provider.      ondansetron 4 MG tablet  Commonly known as: Zofran   4 mg, Oral, 3 Times Daily      pantoprazole 40 MG EC tablet  Commonly known as: Protonix   40 mg, Oral, 2 Times Daily      pregabalin 50 MG capsule  Commonly known as: LYRICA   50 mg, Oral, 3 Times Daily      rosuvastatin 40 MG tablet  Commonly known as: CRESTOR   40 mg, Oral, Nightly      tamsulosin 0.4 MG capsule 24 hr capsule  Commonly known as: Flomax   0.4 mg, Oral, Daily      TechLite Pen Needles 31G X 5 MM misc  Generic drug: Insulin Pen Needle   Use three times a day.      timolol 0.5 % ophthalmic solution  Commonly known as: TIMOPTIC   1 drop, Both Eyes, 2 Times Daily      traZODone 100 MG tablet  Commonly known as: DESYREL   Take 1 tablet by mouth once daily at bedtime      Trulicity 3 MG/0.5ML solution pen-injector  Generic drug: Dulaglutide   Inject 3 mg under the skin into the appropriate area as directed Every 7 (Seven) Days.      vitamin D3 125 MCG (5000 UT) tablet tablet   Take 1 tablet by mouth Daily with meal         Stop These Medications    cloNIDine 0.1 MG tablet  Commonly known as: CATAPRES     fleet enema 7-19 GM/118ML enema     Fleets enema enema     irbesartan 150 MG tablet  Commonly known as: AVAPRO     lactulose 10 GM/15ML solution solution (encephalopathy)  Commonly known as: CHRONULAC     losartan 100 MG  tablet  Commonly known as: COZAAR     methylcellulose (Laxative) 500 MG tablet tablet  Commonly known as: CITRUCEL     methylcellulose oral powder  Commonly known as: Citrucel     metOLazone 2.5 MG tablet  Commonly known as: ZAROXOLYN     polyethylene glycol 17 GM/SCOOP powder  Commonly known as: MIRALAX     polyethylene glycol-electrolytes 420 g solution  Commonly known as: NULYTELY     prazosin 1 MG capsule  Commonly known as: MINIPRESS     prazosin 2 MG capsule  Commonly known as: MINIPRESS  Replaced by: terazosin 5 MG capsule     sucralfate 1 GM/10ML suspension  Commonly known as: CARAFATE     Trulance 3 MG tablet  Generic drug: Plecanatide     Zinc Sulfate 220 (50 Zn) MG tablet            Discharge Diet:   Diet Instructions     Diet: Consistent Carbohydrate; Thin      Discharge Diet: Consistent Carbohydrate    Fluid Consistency: Thin          Discharge Care Plan / Instructions:   Discharge home  Resume Bumex 1 mg daily on 3/2/2023    Activity at Discharge:   Activity Instructions     Activity as Tolerated            Follow-up Appointments:  Follow-up with nephrology in 1 week       Electronically signed by Payam Keyes DO, 02/27/23, 19:01 CST.    Time: Discharge Over 30 min    Part of this note may be an electronic transcription/translation of spoken language to printed text using the Dragon Dictation system.        Electronically signed by Payam Keyes DO at 02/27/23 1907       Discharge Order (From admission, onward)     Start     Ordered    02/27/23 1857  Discharge patient  Once        Expected Discharge Date: 02/27/23    Discharge Disposition: Home or Self Care    Physician of Record for Attribution - Please select from Treatment Team: DAVIDA BEYER [[id number]]    Review needed by CMO to determine Physician of Record: No       Question Answer Comment   Physician of Record for Attribution - Please select from Treatment Team DAVIDA BEYER    Review needed by CMO to determine Physician  of Record No        02/27/23 1909

## 2023-02-28 NOTE — PLAN OF CARE
Goal Outcome Evaluation:  Plan of Care Reviewed With: patient, spouse        Progress: improving  Outcome Evaluation: anticipate DC waiting on hospitalist

## 2023-02-28 NOTE — DISCHARGE SUMMARY
[facility] Medicine Services  DISCHARGE SUMMARY       Date of Admission: 2/24/2023  Date of Discharge:  2/27/2023  Primary Care Physician: Del Shetty MD    Discharge Diagnoses:  Active Hospital Problems    Diagnosis    • **WANDER (acute kidney injury) (HCC)    • Stage 3b chronic kidney disease (HCC)    • Type 2 diabetes mellitus with hyperglycemia, with long-term current use of insulin (HCC)    • Obesity, unspecified obesity severity, unspecified obesity type          Presenting Problem/History of Present Illness:  WANDER (acute kidney injury) (HCC) [N17.9]  Elevated troponin [R77.8]     Chief Complaint on Day of Discharge:   No complaint    History of Present Illness on Day of Discharge:   Renal function is significantly improved.  Creatinine is 2.56 today.  The patient has been seen and evaluated by nephrology and given the improvement in the patient's overall renal function, Dr. Arteaga believes that the patient is appropriate for discharge today with follow-up with nephrology next week.  The patient will resume diuretic therapy with Bumex 1 mg daily on 3/2/2023.    Hospital Course  Patient is a 67-year-old male with medical history of obesity, CKD, CAD, hypertension and diabetes, presenting with complaints of abnormal labs.  Patient reports he was called by his PCP about his abnormal renal function and was recommended to present to the emergency room for evaluation.  Patient reports no changes in his urinary habits.  Reports no chest pain, shortness of breath or any other issues.     Treatment Plan  The patient will be admitted to my service here at [facility].  -Admit observation  -IV gentle hydration  - Continue holding Bumex  - Hold ACE inhibitor's  - I reviewed the rest of his home medications, restart those appropriate for management of his other comorbid condition  - Consult nephrology for eval recommendation    On the morning after admission, the patient  was without complaint.  He was noted to have been sent to the hospital by his primary care physician secondary to abnormal renal function lab work.  Nephrology was consulted as a result.  Nephrology saw and evaluated the patient and agreed with continuation of IV fluids and withholding ARB and diuretics.  Urinary electrolytes and protein to creatinine ratio was obtained.  Renal function was monitored throughout his hospital stay with gradual improvement throughout.  He remained on IV fluids until the day of discharge on 2/27/2023.  Creatinine was 2.56 on that day.  Dr. Arteaga saw and evaluated the patient noting that the patient would require resumption of diuretics soon and would like to have the patient follow-up in 1 week after discharge with SUSAN Cuellar.  ARB and diuretics will be held at discharge as well.    Consults:   Nephrology:  Assessment   1.  Acute kidney injury stage II.  2.  Intravascular volume depletion versus ATN.  3.  Stage IIIb chronic kidney disease baseline.  4.  Type II diabetic nephropathy.  5.  Morbid abdominal obesity.  6.  Orthostatic hypotension.  7.  Secondary hyperparathyroidism.     Plan:  1.  Agree to continue IV fluid.  2.  Agree to hold ARB/diuretics.  3.  Urinary electrolytes.  4.  Urinary protein to creatinine ratio.  5.  Continue to monitor renal function.        Thank you for the consult, we appreciate the opportunity to provide care to your patients.  Feel free to contact me if I can be of any further assistance.       Brian Lomas MD      Result Review    Result Review:  I have personally reviewed the results from the time of this admission to 2/27/2023 19:01 CST and agree with these findings:  []  Laboratory  []  Microbiology  []  Radiology  []  EKG/Telemetry   []  Cardiology/Vascular   []  Pathology  []  Old records  []  Other:      Condition on Discharge:    Stable    Physical Exam on Discharge:  /71 (BP Location: Left arm, Patient Position: Sitting)    "Pulse 56   Temp 98 °F (36.7 °C) (Oral)   Resp 18   Ht 182.9 cm (72\")   Wt (!) 149 kg (328 lb 12.8 oz)   SpO2 99%   BMI 44.59 kg/m²   Physical Exam     Constitutional:       Appearance: He is morbidly obese.   HENT:      Head: Normocephalic and atraumatic.      Right Ear: External ear normal.      Left Ear: External ear normal.      Nose: Nose normal.      Mouth: Mucous membranes are moist.   Eyes:      Extraocular Movements: Extraocular movements intact.      Conjunctiva/sclera: Conjunctivae normal.    Cardiovascular:      Rate and Rhythm: Normal rate and regular rhythm.      Pulses: Normal pulses.      Heart sounds: No murmur heard.  Pulmonary:      Effort: Pulmonary effort is normal.      Breath sounds: Normal breath sounds.   Abdominal:      General: Bowel sounds are normal.      Palpations: Abdomen is soft.   Musculoskeletal:         General: Normal range of motion.      Cervical back: Normal range of motion and neck supple.   Skin:     General: Skin is warm and dry.   Neurological:      General: No focal deficit present.      Mental Status: He is alert and oriented to person, place, and time.   Psychiatric:         Mood and Affect: Mood normal.         Behavior: Behavior normal.     Discharge Disposition:  Home or Self Care    Discharge Medications:     Discharge Medications      New Medications      Instructions Start Date   terazosin 5 MG capsule  Commonly known as: HYTRIN  Replaces: prazosin 2 MG capsule   5 mg, Oral, Nightly         Changes to Medications      Instructions Start Date   bumetanide 2 MG tablet  Commonly known as: BUMEX  What changed:   · when to take this  · These instructions start on March 2, 2023. If you are unsure what to do until then, ask your doctor or other care provider.   2 mg, Oral, Daily   Start Date: March 2, 2023     midodrine 2.5 MG tablet  Commonly known as: PROAMATINE  What changed:   · medication strength  · See the new instructions.   2.5 mg, Oral, 3 Times Daily " Before Meals   Start Date: February 28, 2023        Continue These Medications      Instructions Start Date   24HR Allergy Relief 180 MG tablet  Generic drug: fexofenadine   180 mg, Oral, Daily      albuterol sulfate  (90 Base) MCG/ACT inhaler  Commonly known as: PROVENTIL HFA;VENTOLIN HFA;PROAIR HFA   2 puffs, Inhalation, Every 4 Hours PRN      amitriptyline 25 MG tablet  Commonly known as: ELAVIL   Take 2 tablets by mouth Daily at bedtime.      Aspirin 81 MG capsule   81 mg, Oral, Every 24 Hours      Azelastine HCl 137 MCG/SPRAY solution   Use 2 sprays into the nostril(s) as directed by provider 2 (Two) Times a Day.      busPIRone 10 MG tablet  Commonly known as: BUSPAR   10 mg, Oral, 3 Times Daily PRN      calcitriol 0.5 MCG capsule  Commonly known as: ROCALTROL   0.5 mcg, Oral, Daily      carvedilol 6.25 MG tablet  Commonly known as: COREG   6.25 mg, Oral, 2 Times Daily      colchicine 0.6 MG tablet   0.6 mg, Oral, Daily      cyclobenzaprine 5 MG tablet  Commonly known as: FLEXERIL   Take 1 tablet by mouth 2 (Two) Times a Day As Needed for muscle spasms      Dexcom G6  device   Take As Directed      Dexcom G6 Sensor   Use As Directed.      Dexcom G6 Transmitter misc   Use As Directed.      donepezil 10 MG tablet  Commonly known as: ARICEPT   10 mg, Oral, Daily      DULoxetine 60 MG capsule  Commonly known as: CYMBALTA   60 mg, Oral, Daily      gabapentin 100 MG capsule  Commonly known as: NEURONTIN   100 mg, Oral, 2 Times Daily      GaviLyte-G 236 g solution  Generic drug: polyethylene glycol   GaviLyte-G 236 gram-22.74 gram-6.74 gram-5.86 gram oral solution      HumuLIN R U-500 KwikPen 500 UNIT/ML solution pen-injector CONCENTRATED injection  Generic drug: Insulin Regular Human (Conc)   Inject 120 Units under the skin into the appropriate area as directed 2 (Two) Times a Day with breakfast and dinner.      HYDROcodone-acetaminophen 7.5-325 MG per tablet  Commonly known as: NORCO   1 tablet, Oral,  2 Times Daily PRN      lubiprostone 24 MCG capsule  Commonly known as: Amitiza   Take 1 capsule by mouth 2 (Two) Times a Day--Replaces Trulance      nebivolol 5 MG tablet  Commonly known as: BYSTOLIC   Bystolic 5 mg tablet   TAKE 1 TABLET BY MOUTH EVERY DAY      NovoLOG FlexPen 100 UNIT/ML solution pen-injector sc pen  Generic drug: insulin aspart   Inject up to 150 units subcutaneously twice daily according to sliding scale instructions from provider.      ondansetron 4 MG tablet  Commonly known as: Zofran   4 mg, Oral, 3 Times Daily      pantoprazole 40 MG EC tablet  Commonly known as: Protonix   40 mg, Oral, 2 Times Daily      pregabalin 50 MG capsule  Commonly known as: LYRICA   50 mg, Oral, 3 Times Daily      rosuvastatin 40 MG tablet  Commonly known as: CRESTOR   40 mg, Oral, Nightly      tamsulosin 0.4 MG capsule 24 hr capsule  Commonly known as: Flomax   0.4 mg, Oral, Daily      TechLite Pen Needles 31G X 5 MM misc  Generic drug: Insulin Pen Needle   Use three times a day.      timolol 0.5 % ophthalmic solution  Commonly known as: TIMOPTIC   1 drop, Both Eyes, 2 Times Daily      traZODone 100 MG tablet  Commonly known as: DESYREL   Take 1 tablet by mouth once daily at bedtime      Trulicity 3 MG/0.5ML solution pen-injector  Generic drug: Dulaglutide   Inject 3 mg under the skin into the appropriate area as directed Every 7 (Seven) Days.      vitamin D3 125 MCG (5000 UT) tablet tablet   Take 1 tablet by mouth Daily with meal         Stop These Medications    cloNIDine 0.1 MG tablet  Commonly known as: CATAPRES     fleet enema 7-19 GM/118ML enema     Fleets enema enema     irbesartan 150 MG tablet  Commonly known as: AVAPRO     lactulose 10 GM/15ML solution solution (encephalopathy)  Commonly known as: CHRONULAC     losartan 100 MG tablet  Commonly known as: COZAAR     methylcellulose (Laxative) 500 MG tablet tablet  Commonly known as: CITRUCEL     methylcellulose oral powder  Commonly known as: Citrucel      metOLazone 2.5 MG tablet  Commonly known as: ZAROXOLYN     polyethylene glycol 17 GM/SCOOP powder  Commonly known as: MIRALAX     polyethylene glycol-electrolytes 420 g solution  Commonly known as: NULYTELY     prazosin 1 MG capsule  Commonly known as: MINIPRESS     prazosin 2 MG capsule  Commonly known as: MINIPRESS  Replaced by: terazosin 5 MG capsule     sucralfate 1 GM/10ML suspension  Commonly known as: CARAFATE     Trulance 3 MG tablet  Generic drug: Plecanatide     Zinc Sulfate 220 (50 Zn) MG tablet            Discharge Diet:   Diet Instructions     Diet: Consistent Carbohydrate; Thin      Discharge Diet: Consistent Carbohydrate    Fluid Consistency: Thin          Discharge Care Plan / Instructions:   Discharge home  Resume Bumex 1 mg daily on 3/2/2023    Activity at Discharge:   Activity Instructions     Activity as Tolerated            Follow-up Appointments:  Follow-up with nephrology in 1 week       Electronically signed by Payam Keyes DO, 02/27/23, 19:01 CST.    Time: Discharge Over 30 min    Part of this note may be an electronic transcription/translation of spoken language to printed text using the Dragon Dictation system.

## 2023-02-28 NOTE — OUTREACH NOTE
Prep Survey    Flowsheet Row Responses   [facility] facility patient discharged from? [facility]   Is LACE score < 7 ? No   Eligibility Readm Mgmt   Discharge diagnosis WANDER   Does the patient have one of the following disease processes/diagnoses(primary or secondary)? Other   Does the patient have Home health ordered? No   Is there a DME ordered? No   Prep survey completed? Yes          Shaniqua CHESTER - Registered Nurse

## 2023-03-10 ENCOUNTER — READMISSION MANAGEMENT (OUTPATIENT)
Dept: CALL CENTER | Facility: HOSPITAL | Age: 68
End: 2023-03-10
Payer: COMMERCIAL

## 2023-03-10 NOTE — OUTREACH NOTE
Medical Week 2 Survey    Flowsheet Row Responses   [facility] patient discharged from? [place]   Does the patient have one of the following disease processes/diagnoses(primary or secondary)? Other   Week 2 attempt successful? Yes   Call start time 1313   Discharge diagnosis WANDER   Call end time 1316   Person spoke with today (if not patient) and relationship wife   Meds reviewed with patient/caregiver? Yes   Is the patient taking all medications as directed (includes completed medication regime)? Yes   Does the patient have a primary care provider?  Yes   Does the patient have an appointment with their PCP within 7 days of discharge? Yes   Has the patient kept scheduled appointments due by today? Yes   Has home health visited the patient within 72 hours of discharge? N/A   Psychosocial issues? No   Did the patient receive a copy of their discharge instructions? Yes   What is the patient's perception of their health status since discharge? Improving   Is the patient/caregiver able to teach back signs and symptoms related to disease process for when to call PCP? Yes   Is the patient/caregiver able to teach back signs and symptoms related to disease process for when to call 911? Yes   Is the patient/caregiver able to teach back the hierarchy of who to call/visit for symptoms/problems? PCP, Specialist, Home health nurse, Urgent Care, ED, 911 Yes   Week 2 Call Completed? Yes   Revoked No further contact(revokes)-requires comment   Wrap up additional comments Wife reports Pt is doing fine and does not feel another call is needed as she works at the hospital.           OMERO MARQUES - Registered Nurse

## 2023-03-15 ENCOUNTER — TELEPHONE (OUTPATIENT)
Dept: PHYSICAL THERAPY | Facility: CLINIC | Age: 68
End: 2023-03-15
Payer: COMMERCIAL

## 2023-03-16 ENCOUNTER — TREATMENT (OUTPATIENT)
Dept: PHYSICAL THERAPY | Facility: CLINIC | Age: 68
End: 2023-03-16
Payer: COMMERCIAL

## 2023-03-16 DIAGNOSIS — G44.229 CHRONIC TENSION-TYPE HEADACHE, NOT INTRACTABLE: ICD-10-CM

## 2023-03-16 DIAGNOSIS — M54.2 PAIN, NECK: Primary | ICD-10-CM

## 2023-03-16 PROCEDURE — 97162 PT EVAL MOD COMPLEX 30 MIN: CPT | Performed by: PHYSICAL THERAPIST

## 2023-03-16 PROCEDURE — 97140 MANUAL THERAPY 1/> REGIONS: CPT | Performed by: PHYSICAL THERAPIST

## 2023-03-16 NOTE — PROGRESS NOTES
Physical Therapy Initial Evaluation and Plan of Care  [address] [place] [address]    Patient: Erick Luong               : 1955  Visit Date: 3/16/2023  Referring practitioner: SUSAN Monroy  Date of Initial Visit: 3/16/2023  Patient seen for 1 sessions    Visit Diagnoses:    ICD-10-CM ICD-9-CM   1. Pain, neck  M54.2 723.1   2. Chronic tension-type headache, not intractable  G44.229 339.12     Past Medical History:   Diagnosis Date   • Arthritis    • Autonomic disease    • CAD (coronary artery disease) 2017   • Cervical radiculopathy 2021   • Chronic constipation with acute exaccerbation 05/10/2021   • Coronary artery disease    • Degeneration of cervical intervertebral disc 2021   • Diabetes mellitus (HCC)    • Diabetic foot ulcer (HCC) 2020   • Diabetic polyneuropathy associated with type 2 diabetes mellitus (HCC) 2021   • Elevated cholesterol    • Gastroesophageal reflux disease 2019   • Headache    • HTN (hypertension), benign 2017   • Hyperlipidemia    • Hypertension    • Mixed hyperlipidemia 2017   • Multiple lung nodules 2020    5mm, 9 mm RLL identified 2020, not present 10/2019.   • Myocardial infarction (HCC)    • Osteomyelitis (HCC) 2020   • Osteomyelitis of fifth toe of right foot ([facility]) 10/07/2019   • Pancreatitis    • Persistent insomnia 2020   • Renal disorder    • Sleep apnea 2017   • Sleep apnea with use of continuous positive airway pressure (CPAP)     NON-COMPLIANT   • Slow transit constipation 2019   • Spinal stenosis in cervical region 2021   • Vitamin D deficiency 2021     Past Surgical History:   Procedure Laterality Date   • ABDOMINAL SURGERY     • AMPUTATION FOOT / TOE Left 10/2021    5th digit    • ANTERIOR CERVICAL DISCECTOMY W/ FUSION N/A 2022    Procedure: CERVICAL DISCECTOMY ANTERIOR WITH FUSION C5-6 with possible  plating of C5-7 with neuromonitoring and 1 c-arm;  Surgeon: Karel Soliz MD;  Location:  [facility] OR;  Service: Neurosurgery;  Laterality: N/A;   • APPENDECTOMY     • BACK SURGERY     • CARDIAC CATHETERIZATION Left 2021    Procedure: Left Heart Cath w poss intervention left anatomical snuff box acess;  Surgeon: Omkar Charles DO;  Location:  [facility] CATH INVASIVE LOCATION;  Service: Cardiology;  Laterality: Left;   • CARDIAC SURGERY     • CATARACT EXTRACTION     • CERVICAL SPINE SURGERY     • COLONOSCOPY N/A 2017    Normal exam repeat in 5 years   • COLONOSCOPY N/A 2019    Mild acute inflammation   • COLONOSCOPY W/ POLYPECTOMY  2014    Hyperplastic polyp   • CORONARY ARTERY BYPASS GRAFT  10/2015   • ENDOSCOPY  2011    Gastritis with hemorrhage   • ENDOSCOPY N/A 2017    Normal exam   • ENDOSCOPY N/A 2019    Gastritis   • ENDOSCOPY N/A 2020    Non-erosive gastritis with hemorrhage   • ENDOSCOPY N/A 02/10/2021    Esophagitis   • FOOT SURGERY Left    • INCISION AND DRAINAGE OF WOUND Left 2007    spider bite         SUBJECTIVE     Subjective Evaluation    History of Present Illness  Date of onset: 2023  Mechanism of injury: About a month ago, he started having neck pain and a headache. He had neck surgery about a year ago and did ok, then one day he felt a pop and it started hurting again. He denies any radicular pain. His headache will go away but takes longer to go away than his neck. He says his headache will flare when he walks cause he looks down at the ground.       Patient Occupation: not working Pain  Current pain ratin  At best pain rating: 3  At worst pain ratin  Location: neck/headache  Quality: throbbing and sharp    Social Support  Lives with: spouse (15 yo son)    Hand dominance: left    Diagnostic Tests  X-ray: abnormal    Treatments  Previous treatment: physical therapy  Patient Goals  Patient goals for therapy: decreased pain,  increased motion, improved balance, independence with ADLs/IADLs and increased strength                OBJECTIVE     Objective          Static Posture     Head  Forward.    Shoulders  Rounded.    Thoracic Spine  Hyperkyphosis.    Palpation   Left   Hypertonic in the suboccipitals and upper trapezius.   Tenderness of the suboccipitals and upper trapezius.     Right   Hypertonic in the suboccipitals and upper trapezius. Tenderness of the suboccipitals and upper trapezius.     Neurological Testing     Sensation   Cervical/Thoracic   Left   Intact: light touch    Right   Intact: light touch    Active Range of Motion   Cervical/Thoracic Spine   Cervical    Flexion: Neck active flexion: 50%   Extension: Neck active extension: 20%   Left rotation: Neck active rotation left: 50%   Right rotation: Neck active rotation right: 75%     Ambulation     Comments   Walks with head down and hard heel strike with wide KIRSTEN    Functional Assessment     Comments  Negative Romberg.      Manual Therapy     [id number]  Comments   Supine man cerv traction    Supine tang C1 PA mobs and subocc release    Supine tang CT ext mobs    Supine tang UT STM        Timed Minutes 30          Therapy Education/Self Care [id number]   Education offered today HEP below   Medbride Code    Ongoing HEP   Scapular/cervical retraction with gentle rotation to stretch upper neck  Walk with shorter steps and land softer.   Timed Minutes        Total Timed Treatment:     30   mins  Total Time of Visit:            45   mins    ASSESSMENT/PLAN     GOALS:  Goals                                                    Progress Note due by 4/15/23                                                                Recert due by 6/13/23   LTG by: 6 weeks Comments Date Status   Improve mobility through thoracic and CT junction       Decreased muscle guarding  throughout neck and subocc mms       Reports headache symptoms except occasional minor flares no more than 2-3x/week       Improve  cervical rotation tang to 75% with no pain      Able to walk with upright posture with eyes/head up with softer heel strike      Understands improved ergonomics for work and HEP for flexibility and posture                     Assessment & Plan     Assessment  Impairments: abnormal muscle tone, abnormal or restricted ROM, activity intolerance, impaired balance, lacks appropriate home exercise program and pain with function  Functional Limitations: walking, uncomfortable because of pain and unable to perform repetitive tasks  Assessment details: His pain appears to be referred facet and trigger point pain mostly from his upper neck into his head. His severe forward head posture and his hard heel strike with walking would add extra strain to his upper neck. He is not showing any radicular symptoms or cord symptoms at this time.   This patient would benefit from skilled PT.   Prognosis: good    Plan  Therapy options: will be seen for skilled therapy services  Planned modality interventions: dry needling, low level laser therapy, TENS and traction  Planned therapy interventions: manual therapy, neuromuscular re-education, spinal/joint mobilization, home exercise program, body mechanics training, stretching, therapeutic activities, strengthening, soft tissue mobilization and postural training  Frequency: 3x week  Duration in weeks: 12  Treatment plan discussed with: patient  Plan details: Focus early on pain relief with soft tissue work and other modalities, including potentially dry needling. Manual therapy used for mobilizations of the spine and upper thoracic and flexibility through the upper girdle. Progress with stability for posture and the shoulder girdle and progress HEP for the same.         SIGNATURE: Rigo Escudero, PT, [place] License #: [id number]  Electronically Signed on 3/16/2023      Initial Certification  Certification Period: 3/16/2023 through 6/13/2023  I certify that the therapy services are furnished while  this patient is under my care.  The services outlined above are required by this patient, and will be reviewed every 90 days.     PHYSICIAN: Willy Romero APRN (NPI: [id number])    Signature____________________________________________DATE: _________     Please sign and return via fax to [phone].   Thank you so much for letting us work with Erick. I appreciate your letting us work with your patients. If you have any questions or concerns, please don't hesitate to contact me.          [address] [address]  [phone]

## 2023-03-22 ENCOUNTER — HOSPITAL ENCOUNTER (OUTPATIENT)
Dept: NEUROLOGY | Facility: HOSPITAL | Age: 68
Discharge: HOME | End: 2023-03-22
Admitting: NURSE PRACTITIONER
Payer: COMMERCIAL

## 2023-03-22 DIAGNOSIS — G56.03 BILATERAL CARPAL TUNNEL SYNDROME: ICD-10-CM

## 2023-03-22 PROCEDURE — 95913 NRV CNDJ TEST 13/> STUDIES: CPT | Performed by: PSYCHIATRY & NEUROLOGY

## 2023-03-22 PROCEDURE — 95886 MUSC TEST DONE W/N TEST COMP: CPT | Performed by: PSYCHIATRY & NEUROLOGY

## 2023-03-22 PROCEDURE — 95886 MUSC TEST DONE W/N TEST COMP: CPT

## 2023-03-22 PROCEDURE — 95913 NRV CNDJ TEST 13/> STUDIES: CPT

## 2023-03-30 ENCOUNTER — OFFICE VISIT (OUTPATIENT)
Dept: NEUROSURGERY | Facility: CLINIC | Age: 68
End: 2023-03-30
Payer: COMMERCIAL

## 2023-03-30 VITALS — BODY MASS INDEX: 42.66 KG/M2 | WEIGHT: 315 LBS | HEIGHT: 72 IN

## 2023-03-30 DIAGNOSIS — M54.12 CERVICAL RADICULOPATHY: ICD-10-CM

## 2023-03-30 DIAGNOSIS — E66.01 CLASS 3 SEVERE OBESITY DUE TO EXCESS CALORIES WITH BODY MASS INDEX (BMI) OF 40.0 TO 44.9 IN ADULT, UNSPECIFIED WHETHER SERIOUS COMORBIDITY PRESENT: ICD-10-CM

## 2023-03-30 DIAGNOSIS — Z78.9 NONSMOKER: ICD-10-CM

## 2023-03-30 DIAGNOSIS — G95.9 CERVICAL MYELOPATHY: Primary | ICD-10-CM

## 2023-03-30 DIAGNOSIS — M50.30 DEGENERATION OF CERVICAL INTERVERTEBRAL DISC: ICD-10-CM

## 2023-03-30 DIAGNOSIS — G56.03 BILATERAL CARPAL TUNNEL SYNDROME: ICD-10-CM

## 2023-03-30 PROCEDURE — 99213 OFFICE O/P EST LOW 20 MIN: CPT | Performed by: NURSE PRACTITIONER

## 2023-03-30 RX ORDER — DAPAGLIFLOZIN 10 MG/1
TABLET, FILM COATED ORAL EVERY 24 HOURS
COMMUNITY
Start: 2023-03-08

## 2023-03-30 RX ORDER — PRAZOSIN HYDROCHLORIDE 2 MG/1
CAPSULE ORAL EVERY 24 HOURS
COMMUNITY

## 2023-03-30 RX ORDER — CLONIDINE HYDROCHLORIDE 0.1 MG/1
TABLET ORAL
COMMUNITY

## 2023-03-30 NOTE — PROGRESS NOTES
Chief complaint:   Chief Complaint   Patient presents with   • Neck Pain     Pt here for 6-8wk f/u. Pt states since his last visit symptoms are the same,currently in physical therapy completed sessions x1 due to [place] being booked out.       Subjective     HPI: This is a 67-year-old male gentleman who went to the operating room on August 5, 2022 for an extension of C6/7 fusion to C5-6 for cervical myelopathy along with neck pain and left upper extremity pain and numbness and tingling.  He was seen again in January for bilateral hand numbness and tingling but did come back to the office in February with a complaint of neck pain and pain going over into his right shoulder.  We did send the patient for x-rays as well as a dedicated course of physical therapy.  He is here in follow-up today.  The patient is continuing to complain of neck pain and pain going into his right shoulder but he says it may be a little better than his last appointment.  He has only been able to complete 1 session of therapy due to scheduling conflicts.  He is not complaining of any pain going into his left side or any radicular arm pain.  He is continue to complain of bilateral hand numbness and tingling.  He says it does bother him more during cold months.  He says this has been going on for 3 years.  He says the numbness and tingling can also wake him up from sleeping at night.  He says it is involving the whole hand bilaterally.    Review of Systems   Musculoskeletal: Positive for myalgias and neck pain.   Neurological: Positive for numbness.   Psychiatric/Behavioral: Negative.         Past Medical History:   Diagnosis Date   • Arthritis    • Autonomic disease    • CAD (coronary artery disease) 02/06/2017   • Cervical radiculopathy 09/16/2021   • Chronic constipation with acute exaccerbation 05/10/2021   • Coronary artery disease    • Degeneration of cervical intervertebral disc 08/11/2021   • Diabetes mellitus (HCC)    • Diabetic foot  ulcer (HCC) 08/31/2020   • Diabetic polyneuropathy associated with type 2 diabetes mellitus (HCC) 01/18/2021   • Elevated cholesterol    • Gastroesophageal reflux disease 05/13/2019   • Headache    • HTN (hypertension), benign 05/03/2017   • Hyperlipidemia    • Hypertension    • Mixed hyperlipidemia 02/07/2017   • Multiple lung nodules 01/26/2020    5mm, 9 mm RLL identified 1/2020, not present 10/2019.   • Myocardial infarction (HCC)    • Osteomyelitis (HCC) 01/22/2020   • Osteomyelitis of fifth toe of right foot (HCC) 10/07/2019   • Pancreatitis    • Persistent insomnia 01/20/2020   • Renal disorder    • Sleep apnea 02/06/2017   • Sleep apnea with use of continuous positive airway pressure (CPAP)     NON-COMPLIANT   • Slow transit constipation 01/16/2019   • Spinal stenosis in cervical region 09/16/2021   • Vitamin D deficiency 03/02/2021     Past Surgical History:   Procedure Laterality Date   • ABDOMINAL SURGERY     • AMPUTATION FOOT / TOE Left 10/2021    5th digit    • ANTERIOR CERVICAL DISCECTOMY W/ FUSION N/A 8/5/2022    Procedure: CERVICAL DISCECTOMY ANTERIOR WITH FUSION C5-6 with possible plating of C5-7 with neuromonitoring and 1 c-arm;  Surgeon: Karel Soliz MD;  Location:  [facility] OR;  Service: Neurosurgery;  Laterality: N/A;   • APPENDECTOMY     • BACK SURGERY     • CARDIAC CATHETERIZATION Left 02/08/2021    Procedure: Left Heart Cath w poss intervention left anatomical snuff box acess;  Surgeon: Omkar Charles DO;  Location:  [facility] CATH INVASIVE LOCATION;  Service: Cardiology;  Laterality: Left;   • CARDIAC SURGERY     • CATARACT EXTRACTION     • CERVICAL SPINE SURGERY     • COLONOSCOPY N/A 01/31/2017    Normal exam repeat in 5 years   • COLONOSCOPY N/A 02/11/2019    Mild acute inflammation   • COLONOSCOPY W/ POLYPECTOMY  03/04/2014    Hyperplastic polyp   • CORONARY ARTERY BYPASS GRAFT  10/2015   • ENDOSCOPY  04/13/2011    Gastritis with hemorrhage   • ENDOSCOPY N/A 05/05/2017    Normal  "exam   • ENDOSCOPY N/A 2019    Gastritis   • ENDOSCOPY N/A 2020    Non-erosive gastritis with hemorrhage   • ENDOSCOPY N/A 02/10/2021    Esophagitis   • FOOT SURGERY Left    • INCISION AND DRAINAGE OF WOUND Left 2007    spider bite     Family History   Problem Relation Age of Onset   • Colon cancer Father    • Heart disease Father    • Colon cancer Sister    • Colon polyps Sister    • Alzheimer's disease Mother    • Coronary artery disease Sister    • Coronary artery disease Sister      Social History     Tobacco Use   • Smoking status: Former     Types: Cigarettes     Quit date:      Years since quittin.2   • Smokeless tobacco: Never   • Tobacco comments:     smoked in [employer]   Vaping Use   • Vaping Use: Never used   Substance Use Topics   • Alcohol use: No   • Drug use: No     (Not in a hospital admission)    Allergies:  Cefepime, Bactrim [sulfamethoxazole-trimethoprim], Vancomycin, Zolpidem, Zolpidem tartrate, and Metronidazole    Objective      Vital Signs  Ht 182.9 cm (72\")   Wt (!) 144 kg (318 lb)   BMI 43.13 kg/m²     Physical Exam  Constitutional:       Appearance: He is well-developed.   HENT:      Head: Normocephalic.   Eyes:      Extraocular Movements: EOM normal.      Pupils: Pupils are equal, round, and reactive to light.   Pulmonary:      Effort: Pulmonary effort is normal.   Musculoskeletal:         General: Normal range of motion.      Cervical back: Normal range of motion.   Skin:     General: Skin is warm.   Neurological:      Mental Status: He is alert and oriented to person, place, and time.      GCS: GCS eye subscore is 4. GCS verbal subscore is 5. GCS motor subscore is 6.      Cranial Nerves: No cranial nerve deficit.      Sensory: No sensory deficit.      Motor: Motor strength is normal.      Gait: Gait is intact. Gait normal.      Deep Tendon Reflexes: Reflexes are normal and symmetric.   Psychiatric:         Speech: Speech normal.         Behavior: Behavior " normal.         Thought Content: Thought content normal.         Neurologic Exam     Mental Status   Oriented to person, place, and time.   Attention: normal. Concentration: normal.   Speech: speech is normal   Level of consciousness: alert  Normal comprehension.     Cranial Nerves     CN II   Visual fields full to confrontation.     CN III, IV, VI   Pupils are equal, round, and reactive to light.  Extraocular motions are normal.     CN V   Facial sensation intact.     CN VII   Facial expression full, symmetric.     CN VIII   CN VIII normal.     CN IX, X   CN IX normal.   CN X normal.     CN XI   CN XI normal.     CN XII   CN XII normal.     Motor Exam   Muscle bulk: normal    Strength   Strength 5/5 throughout.     Sensory Exam   Light touch normal.     Gait, Coordination, and Reflexes     Gait  Gait: normal    Reflexes   Reflexes 2+ except as noted.       Imaging review: X-rays of the cervical spine show good placement of the cervical fusion at C5-6 and 6-7.  There is loss of the normal cervical curvature.        EMG/NCS of the bilateral upper extremities that was done on May 22, 2022 shows severe sensorimotor axonal polyneuropathy.  There is also concern for superimposed bilateral carpal tunnel syndrome that is felt to be severe as well.  No evidence of any cervical radiculopathy or cubital tunnel syndrome.        Assessment/Plan: The patient does have neck pain radiating over to his right shoulder.  Is getting continue working with therapy for the time being.  He was told that if he did not make any improvement from the therapy to give us a call and we could look into ordering an MRI of the cervical spine.  In regards to his hand numbness there is concern about polyneuropathy along with carpal tunnel syndrome.  I am going to start him into bilateral cock-up splints and let him wear this for the time being and see if this will help with his symptoms and have him reevaluated by Dr. Soliz to see if he feels the  patient would either benefit from carpal tunnel release versus if this is felt to be more related to the polyneuropathy.  They acknowledged understanding.  Their questions and concerns were addressed.  The patient is not a good candidate for steroid injections due to his difficulty with controlling his diabetes.  Patient is a nonsmoker  The patient's Body mass index is 43.13 kg/m².. BMI is above normal parameters. Recommendations include: educational material and nutrition counseling  Advance Care Planning   ACP discussion was held with the patient during this visit. Patient does not have an advance directive, information provided.  STEAD[facility] Fall Risk Assessment was completed, and patient is at LOW risk for falls.Assessment completed on:1/18/2023       Diagnoses and all orders for this visit:    1. Cervical myelopathy (HCC) (Primary)    2. Degeneration of cervical intervertebral disc    3. Cervical radiculopathy    4. Bilateral carpal tunnel syndrome  -     Miscellaneous DME    5. Nonsmoker    6. Class 3 severe obesity due to excess calories with body mass index (BMI) of 40.0 to 44.9 in adult, unspecified whether serious comorbidity present (HCC)          I discussed the patients findings and my recommendations with patient    Willy Romero, APRN  03/30/23  11:25 CDT

## 2023-03-30 NOTE — PATIENT INSTRUCTIONS
Advance Care Planning and Advance Directives     You make decisions on a daily basis - decisions about where you want to live, your career, your home, your life. Perhaps one of the most important decisions you face is your choice for future medical care. Take time to talk with your family and your healthcare team and start planning today.  Advance Care Planning is a process that can help you:  Understand possible future healthcare decisions in light of your own experiences  Reflect on those decision in light of your goals and values  Discuss your decisions with those closest to you and the healthcare professionals that care for you  Make a plan by creating a document that reflects your wishes    Surrogate Decision Maker  In the event of a medical emergency, which has left you unable to communicate or to make your own decisions, you would need someone to make decisions for you.  It is important to discuss your preferences for medical treatment with this person while you are in good health.     Qualities of a surrogate decision maker:  Willing to take on this role and responsibility  Knows what you want for future medical care  Willing to follow your wishes even if they don't agree with them  Able to make difficult medical decisions under stressful circumstances    Advance Directives  These are legal documents you can create that will guide your healthcare team and decision maker(s) when needed. These documents can be stored in the electronic medical record.    Living Will - a legal document to guide your care if you have a terminal condition or a serious illness and are unable to communicate. States vary by statute in document names/types, but most forms may include one or more of the following:        -  Directions regarding life-prolonging treatments        -  Directions regarding artificially provided nutrition/hydration        -  Choosing a healthcare decision maker        -  Direction regarding organ/tissue  donation    Durable Power of  for Healthcare - this document names an -in-fact to make medical decisions for you, but it may also allow this person to make personal and financial decisions for you. Please seek the advice of an  if you need this type of document.    **Advance Directives are not required and no one may discriminate against you if you do not sign one.    Medical Orders  Many states allow specific forms/orders signed by your physician to record your wishes for medical treatment in your current state of health. This form, signed in personal communication with your physician, addresses resuscitation and other medical interventions that you may or may not want.      For more information or to schedule a time with a [facility] Advance Care Planning Facilitator contact: [facility] or call [phone] and someone will contact you directly.BMI for Adults  What is BMI?  Body mass index (BMI) is a number that is calculated from a person's weight and height. BMI can help estimate how much of a person's weight is composed of fat. BMI does not measure body fat directly. Rather, it is an alternative to procedures that directly measure body fat, which can be difficult and expensive.  BMI can help identify people who may be at higher risk for certain medical problems.  What are BMI measurements used for?  BMI is used as a screening tool to identify possible weight problems. It helps determine whether a person is obese, overweight, a healthy weight, or underweight.  BMI is useful for:  Identifying a weight problem that may be related to a medical condition or may increase the risk for medical problems.  Promoting changes, such as changes in diet and exercise, to help reach a healthy weight. BMI screening can be repeated to see if these changes are working.  How is BMI calculated?  BMI involves measuring your weight in relation to your height. Both height and weight are measured,  "and the BMI is calculated from those numbers. This can be done either in English (U.S.) or metric measurements. Note that charts and online BMI calculators are available to help you find your BMI quickly and easily without having to do these calculations yourself.  To calculate your BMI in English (U.S.) measurements:    Measure your weight in pounds (lb).  Multiply the number of pounds by 703.  For example, for a person who weighs 180 lb, multiply that number by 703, which equals 126,540.  Measure your height in inches. Then multiply that number by itself to get a measurement called \"inches squared.\"  For example, for a person who is 70 inches tall, the \"inches squared\" measurement is 70 inches x 70 inches, which equals 4,900 inches squared.  Divide the total from step 2 (number of lb x 703) by the total from step 3 (inches squared): 126,540 ÷ 4,900 = 25.8. This is your BMI.    To calculate your BMI in metric measurements:  Measure your weight in kilograms (kg).  Measure your height in meters (m). Then multiply that number by itself to get a measurement called \"meters squared.\"  For example, for a person who is 1.75 m tall, the \"meters squared\" measurement is 1.75 m x 1.75 m, which is equal to 3.1 meters squared.  Divide the number of kilograms (your weight) by the meters squared number. In this example: 70 ÷ 3.1 = 22.6. This is your BMI.  What do the results mean?  BMI charts are used to identify whether you are underweight, normal weight, overweight, or obese. The following guidelines will be used:  Underweight: BMI less than 18.5.  Normal weight: BMI between 18.5 and 24.9.  Overweight: BMI between 25 and 29.9.  Obese: BMI of 30 or above.  Keep these notes in mind:  Weight includes both fat and muscle, so someone with a muscular build, such as an athlete, may have a BMI that is higher than 24.9. In cases like these, BMI is not an accurate measure of body fat.  To determine if excess body fat is the cause of a BMI " of 25 or higher, further assessments may need to be done by a health care provider.  BMI is usually interpreted in the same way for men and women.  Where to find more information  For more information about BMI, including tools to quickly calculate your BMI, go to these websites:  Centers for Disease Control and Prevention: www.cdc.gov  American Heart Association: www.heart.org  National Heart, Lung, and Blood [place]: www.nhlbi.nih.gov  Summary  Body mass index (BMI) is a number that is calculated from a person's weight and height.  BMI may help estimate how much of a person's weight is composed of fat. BMI can help identify those who may be at higher risk for certain medical problems.  BMI can be measured using English measurements or metric measurements.  BMI charts are used to identify whether you are underweight, normal weight, overweight, or obese.  This information is not intended to replace advice given to you by your health care provider. Make sure you discuss any questions you have with your health care provider.  Document Revised: 09/09/2020 Document Reviewed: 07/17/2020  [employer] Patient Education © 2021 [employer]

## 2023-04-10 ENCOUNTER — TELEPHONE (OUTPATIENT)
Dept: PHYSICAL THERAPY | Facility: CLINIC | Age: 68
End: 2023-04-10

## 2023-04-17 ENCOUNTER — TREATMENT (OUTPATIENT)
Dept: PHYSICAL THERAPY | Facility: CLINIC | Age: 68
End: 2023-04-17
Payer: COMMERCIAL

## 2023-04-17 DIAGNOSIS — M54.12 RADICULOPATHY, CERVICAL: ICD-10-CM

## 2023-04-17 DIAGNOSIS — M54.2 PAIN, NECK: Primary | ICD-10-CM

## 2023-04-17 DIAGNOSIS — G44.229 CHRONIC TENSION-TYPE HEADACHE, NOT INTRACTABLE: ICD-10-CM

## 2023-04-17 PROCEDURE — 97140 MANUAL THERAPY 1/> REGIONS: CPT | Performed by: PHYSICAL THERAPIST

## 2023-04-17 NOTE — PROGRESS NOTES
Physical Therapy Treatment Note and 30 Day Progress Note  [address] [place] [address]    Patient: Erick Luong                                                 Visit Date: 2023  :     1955    Referring practitioner:    SUSAN Monroy  Date of Initial Visit:          Type: THERAPY  Noted: 3/16/2023    Patient seen for 2 sessions    Visit Diagnoses:    ICD-10-CM ICD-9-CM   1. Pain, neck  M54.2 723.1   2. Chronic tension-type headache, not intractable  G44.229 339.12   3. Radiculopathy, cervical  M54.12 723.4     SUBJECTIVE     Subjective He says that his HEP is really helping and he is about 50% improved.     PAIN: 3/10         OBJECTIVE     Objective        Manual Therapy     [id number]  Comments   Prone Ct extension mobs and C1 PA mobs    Supine man cerv traction    Supine tang C1 PA mobs and subocc release    Facet opening mobs with rotation 3 cavitaitons noted           Timed Minutes 30          Therapy Education/Self Care 07881   Education offered today HEP below   Medbride Code    Ongoing HEP   Scapular/cervical retraction with gentle rotation to stretch upper neck  Walk with shorter steps and land softer.   Timed Minutes        Total Timed Treatment:     30   mins  Total Time of Visit:            30   mins    ASSESSMENT/PLAN     GOALS:  Goals                                                    Progress Note due by 23                                                                Recert due by 23   LTG by: 6 weeks Comments Date Status   Improve mobility through thoracic and CT junction  still stiff  ongoin   Decreased muscle guarding  throughout neck and subocc mms Not guarded, just stiff  MET   Reports headache symptoms except occasional minor flares no more than 2-3x/week Headaches are less frequent now  ongoing   Improve cervical rotation tang to 75% with no pain 75% left, 50% right  progressing    Able to walk with upright posture with eyes/head up with softer heel strike Still tends to drag feet some 4/17 ongoing   Understands improved ergonomics for work and HEP for flexibility and posture Focusing on postural exercises 4/17 progressing                 Assessment & Plan     Assessment  Impairments: abnormal muscle tone, abnormal or restricted ROM, activity intolerance, impaired balance, lacks appropriate home exercise program and pain with function  Functional Limitations: walking, uncomfortable because of pain and unable to perform repetitive tasksPrognosis: good    Plan  Therapy options: will be seen for skilled therapy services  Planned modality interventions: dry needling, low level laser therapy, TENS and traction  Planned therapy interventions: manual therapy, neuromuscular re-education, spinal/joint mobilization, home exercise program, body mechanics training, stretching, therapeutic activities, strengthening, soft tissue mobilization and postural training  Frequency: 3x week  Duration in weeks: 12  Treatment plan discussed with: patient         ASSESSMENT: He was late today due to traffic. Today was his first PT visit since his eval almost a month ago. He has missed due to his wife being hospitalized and availability. He has already shown improvement with just his postural exercises that he's been doing.     PLAN: continue to emphasize manual therapy to loosen neck.     SIGNATURE: Rigo Escudero, PT, [place] License #: [id number]  Electronically Signed on 4/17/2023        [facility] [place] [address]  [phone]

## 2023-04-19 ENCOUNTER — TREATMENT (OUTPATIENT)
Dept: PHYSICAL THERAPY | Facility: CLINIC | Age: 68
End: 2023-04-19
Payer: COMMERCIAL

## 2023-04-19 DIAGNOSIS — M54.12 RADICULOPATHY, CERVICAL: ICD-10-CM

## 2023-04-19 DIAGNOSIS — M54.2 PAIN, NECK: Primary | ICD-10-CM

## 2023-04-19 DIAGNOSIS — G44.229 CHRONIC TENSION-TYPE HEADACHE, NOT INTRACTABLE: ICD-10-CM

## 2023-04-19 PROCEDURE — 97140 MANUAL THERAPY 1/> REGIONS: CPT | Performed by: PHYSICAL THERAPIST

## 2023-04-19 NOTE — PROGRESS NOTES
Physical Therapy Treatment Note  [address] [place] [address]    Patient: Erick Luong                                                 Visit Date: 2023  :     1955    Referring practitioner:    SUSAN Monroy  Date of Initial Visit:          Type: THERAPY  Noted: 3/16/2023    Patient seen for 3 sessions    Visit Diagnoses:    ICD-10-CM ICD-9-CM   1. Pain, neck  M54.2 723.1   2. Chronic tension-type headache, not intractable  G44.229 339.12   3. Radiculopathy, cervical  M54.12 723.4     SUBJECTIVE     Subjective He says that he has had less headaches and is still progressing well.     PAIN: 3/10         OBJECTIVE     Objective        Manual Therapy     [id number]  Comments   Prone Ct extension mobs and C1 PA mobs    Supine man cerv traction    Supine tang C1 PA mobs and subocc release Tender here   Facet opening mobs with rotation 2 cavitaitons noted           Timed Minutes 45          Therapy Education/Self Care [id number]   Education offered today HEP below   Medbride Code    Ongoing HEP   Scapular/cervical retraction with gentle rotation to stretch upper neck  Walk with shorter steps and land softer.   Timed Minutes        Total Timed Treatment:     45   mins  Total Time of Visit:            45  mins    ASSESSMENT/PLAN     GOALS:  Goals                                                    Progress Note due by 23                                                                Recert due by 23   LTG by: 6 weeks Comments Date Status   Improve mobility through thoracic and CT junction  still stiff  ongoin   Decreased muscle guarding  throughout neck and subocc mms Not guarded, just stiff  MET   Reports headache symptoms except occasional minor flares no more than 2-3x/week Headaches are less frequent now  ongoing   Improve cervical rotation tang to 75% with no pain 75% left, 50% right  progressing   Able to walk  with upright posture with eyes/head up with softer heel strike Still tends to drag feet some 4/17 ongoing   Understands improved ergonomics for work and HEP for flexibility and posture Focusing on postural exercises 4/17 progressing                 Assessment/Plan     ASSESSMENT: He is still progressing well with fewer occurrences of headaches.    PLAN: continue to emphasize manual therapy to loosen neck. Progress toward DC if he continues to do well.     SIGNATURE: Rigo Escudero, PT, [place] License #: [id number]  Electronically Signed on 4/19/2023        [address] [place] [address]. [address]  [phone]

## 2023-04-24 ENCOUNTER — TREATMENT (OUTPATIENT)
Dept: PHYSICAL THERAPY | Facility: CLINIC | Age: 68
End: 2023-04-24
Payer: COMMERCIAL

## 2023-04-24 DIAGNOSIS — M54.2 PAIN, NECK: Primary | ICD-10-CM

## 2023-04-24 DIAGNOSIS — G44.229 CHRONIC TENSION-TYPE HEADACHE, NOT INTRACTABLE: ICD-10-CM

## 2023-04-24 DIAGNOSIS — M54.12 RADICULOPATHY, CERVICAL: ICD-10-CM

## 2023-04-24 PROCEDURE — 97110 THERAPEUTIC EXERCISES: CPT | Performed by: PHYSICAL THERAPIST

## 2023-04-24 PROCEDURE — 97140 MANUAL THERAPY 1/> REGIONS: CPT | Performed by: PHYSICAL THERAPIST

## 2023-04-24 NOTE — PROGRESS NOTES
Physical Therapy Treatment Note  [address] [place] [address]    Patient: Erick Luong                                                 Visit Date: 2023  :     1955    Referring practitioner:    SUSAN Monroy  Date of Initial Visit:          Type: THERAPY  Noted: 3/16/2023    Patient seen for 4 sessions    Visit Diagnoses:    ICD-10-CM ICD-9-CM   1. Pain, neck  M54.2 723.1   2. Chronic tension-type headache, not intractable  G44.229 339.12   3. Radiculopathy, cervical  M54.12 723.4     SUBJECTIVE     Subjective He reports he has a couple of headaches a day. Still having some soreness in his neck and still having some N/T.    PAIN: 4/10         OBJECTIVE     Objective      Manual Therapy     30293  Comments   Cervical suboccipital releases, manual traction and traction with B lateral bends  HL                   Timed Minutes 30        Therapeutic Exercises    37019 Units Comments   HL t's with yellow T band x 15     HL y's with yellow T band x 15     B unilateral serratus punches #2 x 15               Timed Minutes 15       Therapy Education/Self Care 09876   Education offered today    Medwilliane Code    Ongoing HEP   Scapular/cervical retraction with gentle rotation to stretch upper neck  Walk with shorter steps and land softer.   Timed Minutes        Total Timed Treatment:     45   mins  Total Time of Visit:             45   mins         ASSESSMENT/PLAN     GOALS  Goals                                                    Progress Note due by 23                                                                Recert due by 23   LTG by: 6 weeks Comments Date Status   Improve mobility through thoracic and CT junction  still stiff  ongoin   Decreased muscle guarding  throughout neck and subocc mms Not guarded, just stiff  MET   Reports headache symptoms except occasional minor flares no more than 2-3x/week  Headaches are less frequent now 4/19 ongoing   Improve cervical rotation tang to 75% with no pain 75% left, 50% right 4/17 progressing   Able to walk with upright posture with eyes/head up with softer heel strike Requires cueing for posture 4/24 ongoing   Understands improved ergonomics for work and HEP for flexibility and posture Focusing on postural exercises 4/17 progressing         Assessment/Plan     ASSESSMENT: His forward flexed shoulders and head contribute significantly to his symptoms.    PLAN: Continue cervical mobilizations and address his postural awareness and control.    SIGNATURE: Alexey Palacios, [facility], [place] License #: [id number]  Electronically Signed on 4/24/2023        [address] [place] [address]  [phone]

## 2023-04-26 ENCOUNTER — TREATMENT (OUTPATIENT)
Dept: PHYSICAL THERAPY | Facility: CLINIC | Age: 68
End: 2023-04-26
Payer: COMMERCIAL

## 2023-04-26 DIAGNOSIS — M54.12 RADICULOPATHY, CERVICAL: ICD-10-CM

## 2023-04-26 DIAGNOSIS — G44.229 CHRONIC TENSION-TYPE HEADACHE, NOT INTRACTABLE: ICD-10-CM

## 2023-04-26 DIAGNOSIS — M54.2 PAIN, NECK: Primary | ICD-10-CM

## 2023-04-26 PROCEDURE — 97140 MANUAL THERAPY 1/> REGIONS: CPT | Performed by: PHYSICAL THERAPIST

## 2023-04-26 NOTE — PROGRESS NOTES
Physical Therapy Treatment Note  [address] [place] [address]    Patient: Erick Luong                                                 Visit Date: 2023  :     1955    Referring practitioner:    SUSAN Monroy  Date of Initial Visit:          Type: THERAPY  Noted: 3/16/2023    Patient seen for 5 sessions    Visit Diagnoses:    ICD-10-CM ICD-9-CM   1. Pain, neck  M54.2 723.1   2. Chronic tension-type headache, not intractable  G44.229 339.12   3. Radiculopathy, cervical  M54.12 723.4     SUBJECTIVE     Subjective He says that he is still doing well. His neck is mostly just stuff and denies any radicular symptoms.     PAIN: 4/10         OBJECTIVE     Objective      Manual Therapy     04545  Comments   Prone CT/rot    Prone tang UT/cervical paraspinals STM    Prone C1 PA mobs    Supine manual cervical traction    Facet upglide mobs    Timed Minutes 45        Therapy Education/Self Care 27082   Education offered today    Medbride Code    Ongoing HEP   Scapular/cervical retraction with gentle rotation to stretch upper neck  Walk with shorter steps and land softer.   Timed Minutes        Total Timed Treatment:     45   mins  Total Time of Visit:             45   mins         ASSESSMENT/PLAN     GOALS  Goals                                                    Progress Note due by 23                                                                Recert due by 23   LTG by: 6 weeks Comments Date Status   Improve mobility through thoracic and CT junction  still stiff  ongoing   Decreased muscle guarding  throughout neck and subocc mms Not guarded, just stiff  MET   Reports headache symptoms except occasional minor flares no more than 2-3x/week Headaches are less frequent now  ongoing   Improve cervical rotation tang to 75% with no pain 75% left, 50% right  progressing   Able to walk with upright posture with  eyes/head up with softer heel strike Requires cueing for posture 4/24 ongoing   Understands improved ergonomics for work and HEP for flexibility and posture Focusing on postural exercises 4/17 progressing         Assessment/Plan     ASSESSMENT: He continues to do well and is close to DC.     PLAN: Continue cervical mobilizations and address his postural awareness and control. Progress to DC when his symptoms platea.    SIGNATURE: Rigo Escudero, PT, [place] License #: [id number]  Electronically Signed on 4/26/2023        [address] [place] [address] [place] [address]  [phone]

## 2023-05-01 ENCOUNTER — TREATMENT (OUTPATIENT)
Dept: PHYSICAL THERAPY | Facility: CLINIC | Age: 68
End: 2023-05-01
Payer: COMMERCIAL

## 2023-05-01 DIAGNOSIS — M54.12 RADICULOPATHY, CERVICAL: ICD-10-CM

## 2023-05-01 DIAGNOSIS — M54.2 PAIN, NECK: Primary | ICD-10-CM

## 2023-05-01 DIAGNOSIS — G44.229 CHRONIC TENSION-TYPE HEADACHE, NOT INTRACTABLE: ICD-10-CM

## 2023-05-01 PROCEDURE — 97140 MANUAL THERAPY 1/> REGIONS: CPT | Performed by: PHYSICAL THERAPIST

## 2023-05-01 NOTE — PROGRESS NOTES
Physical Therapy Treatment Note  [address] [place] [address]    Patient: Erick Luong                                                 Visit Date: 2023  :     1955    Referring practitioner:    SUSAN Monroy  Date of Initial Visit:          Type: THERAPY  Noted: 3/16/2023    Patient seen for 6 sessions    Visit Diagnoses:    ICD-10-CM ICD-9-CM   1. Pain, neck  M54.2 723.1   2. Chronic tension-type headache, not intractable  G44.229 339.12   3. Radiculopathy, cervical  M54.12 723.4     SUBJECTIVE     Subjective He says that he is still doing well. His headaches are mostly gone and his pain is lower.     PAIN: 2-3/10         OBJECTIVE     Objective      Manual Therapy     24166  Comments   Prone CT/rot    Prone tang UT/cervical paraspinals STM    Prone C1 PA mobs    Supine manual cervical traction    Facet upglide mobs Multiple cavitations   Timed Minutes 45        Therapy Education/Self Care 67007   Education offered today    Medbride Code    Ongoing HEP   Scapular/cervical retraction with gentle rotation to stretch upper neck  Walk with shorter steps and land softer.   Timed Minutes        Total Timed Treatment:     45   mins  Total Time of Visit:             45   mins         ASSESSMENT/PLAN     GOALS  Goals                                                    Progress Note due by 23                                                                Recert due by 23   LTG by: 6 weeks Comments Date Status   Improve mobility through thoracic and CT junction  still stiff  ongoing   Decreased muscle guarding  throughout neck and subocc mms Not guarded, just stiff  MET   Reports headache symptoms except occasional minor flares no more than 2-3x/week No headaches  MET   Improve cervical rotation tang to 75% with no pain 75% left, 50% right  progressing   Able to walk with upright posture with eyes/head up with  softer heel strike Requires cueing for posture 4/24 ongoing   Understands improved ergonomics for work and HEP for flexibility and posture Focusing on postural exercises 4/17 progressing         Assessment/Plan     ASSESSMENT: He continues to do well and is close to DC.     PLAN: Continue cervical mobilizations and address his postural awareness and control. Progress to DC when his symptoms platea.    SIGNATURE: Rigo Escudero, PT, [place] License #: [id number]  Electronically Signed on 5/1/2023        [address] [place] [address] [place] [address]  [phone]

## 2023-05-03 ENCOUNTER — TREATMENT (OUTPATIENT)
Dept: PHYSICAL THERAPY | Facility: CLINIC | Age: 68
End: 2023-05-03
Payer: COMMERCIAL

## 2023-05-03 DIAGNOSIS — G44.229 CHRONIC TENSION-TYPE HEADACHE, NOT INTRACTABLE: ICD-10-CM

## 2023-05-03 DIAGNOSIS — M54.12 RADICULOPATHY, CERVICAL: ICD-10-CM

## 2023-05-03 DIAGNOSIS — M54.2 PAIN, NECK: Primary | ICD-10-CM

## 2023-05-03 PROCEDURE — 97140 MANUAL THERAPY 1/> REGIONS: CPT | Performed by: PHYSICAL THERAPIST

## 2023-05-03 NOTE — PROGRESS NOTES
Physical Therapy Treatment Note  [address] [place] [address]    Patient: Erick Luong                                                 Visit Date: 5/3/2023  :     1955    Referring practitioner:    SUSAN Monroy  Date of Initial Visit:          Type: THERAPY  Noted: 3/16/2023    Patient seen for 7 sessions    Visit Diagnoses:    ICD-10-CM ICD-9-CM   1. Pain, neck  M54.2 723.1   2. Chronic tension-type headache, not intractable  G44.229 339.12   3. Radiculopathy, cervical  M54.12 723.4     SUBJECTIVE     Subjective He says this is the best he's felt in 10 years. He has no pain today and has been doing his HEP.     PAIN: 0/10         OBJECTIVE     Objective      Manual Therapy     62680  Comments   Prone CT/rot    Prone tang UT/cervical paraspinals STM    Prone C1 PA mobs    Supine manual cervical traction    Facet upglide mobs Multiple cavitations tang   Timed Minutes 45        Therapy Education/Self Care 19831   Education offered today    Medbride Code    Ongoing HEP   Scapular/cervical retraction with gentle rotation to stretch upper neck  Walk with shorter steps and land softer.   Timed Minutes        Total Timed Treatment:     45   mins  Total Time of Visit:             45   mins         ASSESSMENT/PLAN     GOALS  Goals                                                    Progress Note due by 23                                                                Recert due by 23   LTG by: 6 weeks Comments Date Status   Improve mobility through thoracic and CT junction  still stiff  ongoing   Decreased muscle guarding  throughout neck and subocc mms Not guarded, just stiff  MET   Reports headache symptoms except occasional minor flares no more than 2-3x/week No headaches  MET   Improve cervical rotation tang to 75% with no pain 75% left, 50% right  progressing   Able to walk with upright posture with eyes/head  up with softer heel strike Requires cueing for posture 4/24 ongoing   Understands improved ergonomics for work and HEP for flexibility and posture Focusing on postural exercises 4/17 progressing         Assessment/Plan     ASSESSMENT: He continues to do well and is close to DC. I canceled his next appointment to stretch out his visits to see how he does when he's not here.     PLAN: Continue cervical mobilizations and address his postural awareness and control. Progress to DC when his symptoms platea.    SIGNATURE: Rigo Escudero, PT, [place] License #: [id number]  Electronically Signed on 5/3/2023        [address] [place] [address]. [address]  [phone]

## 2023-05-10 ENCOUNTER — TELEPHONE (OUTPATIENT)
Dept: PHYSICAL THERAPY | Facility: CLINIC | Age: 68
End: 2023-05-10

## 2023-05-15 ENCOUNTER — TELEPHONE (OUTPATIENT)
Dept: PHYSICAL THERAPY | Facility: CLINIC | Age: 68
End: 2023-05-15

## 2023-05-15 NOTE — TELEPHONE ENCOUNTER
Caller: Joan Luong    Relationship: Emergency Contact         What was the call regarding: HURT HIMSELF CAN NOT COME IN

## 2023-05-17 ENCOUNTER — TELEPHONE (OUTPATIENT)
Dept: PHYSICAL THERAPY | Facility: CLINIC | Age: 68
End: 2023-05-17

## 2023-05-22 ENCOUNTER — TELEPHONE (OUTPATIENT)
Dept: PHYSICAL THERAPY | Facility: OTHER | Age: 68
End: 2023-05-22
Payer: COMMERCIAL

## 2023-05-22 NOTE — TELEPHONE ENCOUNTER
Caller: Erick Luong    Relationship: Self    What was the call regarding: PATIENT CANCELLED APPT TODAY DUE TO NOT FEELING WELL

## 2023-06-15 PROBLEM — I50.33 ACUTE ON CHRONIC DIASTOLIC HEART FAILURE: Status: ACTIVE | Noted: 2023-06-15

## 2023-06-15 PROBLEM — E87.70 HYPERVOLEMIA, UNSPECIFIED HYPERVOLEMIA TYPE: Status: ACTIVE | Noted: 2023-06-15

## 2023-06-15 PROBLEM — L97.422 DIABETIC ULCER OF LEFT MIDFOOT ASSOCIATED WITH TYPE 2 DIABETES MELLITUS, WITH FAT LAYER EXPOSED: Status: ACTIVE | Noted: 2020-08-31

## 2023-06-15 PROBLEM — I21.A1 TYPE 2 MYOCARDIAL INFARCTION DUE TO HEART FAILURE: Status: ACTIVE | Noted: 2023-06-15

## 2023-06-15 PROBLEM — R33.8 ACUTE URINARY RETENTION: Status: ACTIVE | Noted: 2023-06-15

## 2023-06-15 PROBLEM — I50.9 TYPE 2 MYOCARDIAL INFARCTION DUE TO HEART FAILURE: Status: ACTIVE | Noted: 2023-06-15

## 2023-06-18 PROBLEM — L23.7 POISON IVY DERMATITIS: Status: ACTIVE | Noted: 2023-06-18

## 2023-07-21 LAB
ALBUMIN SERPL-MCNC: 3.7 G/DL (ref 3.5–5.2)
ALBUMIN/GLOB SERPL: 1.1 G/DL
ALP SERPL-CCNC: 111 U/L (ref 39–117)
ALT SERPL W P-5'-P-CCNC: 16 U/L (ref 1–41)
ANION GAP SERPL CALCULATED.3IONS-SCNC: 15 MMOL/L (ref 5–15)
AST SERPL-CCNC: 17 U/L (ref 1–40)
BASOPHILS # BLD AUTO: 0.05 10*3/MM3 (ref 0–0.2)
BASOPHILS NFR BLD AUTO: 0.5 % (ref 0–1.5)
BILIRUB SERPL-MCNC: 0.3 MG/DL (ref 0–1.2)
BUN SERPL-MCNC: 42 MG/DL (ref 8–23)
BUN/CREAT SERPL: 20.2 (ref 7–25)
CALCIUM SPEC-SCNC: 8.9 MG/DL (ref 8.6–10.5)
CHLORIDE SERPL-SCNC: 99 MMOL/L (ref 98–107)
CO2 SERPL-SCNC: 22 MMOL/L (ref 22–29)
CREAT SERPL-MCNC: 2.08 MG/DL (ref 0.76–1.27)
D-LACTATE SERPL-SCNC: 1.5 MMOL/L (ref 0.5–2)
DEPRECATED RDW RBC AUTO: 44.3 FL (ref 37–54)
EGFRCR SERPLBLD CKD-EPI 2021: 34.3 ML/MIN/1.73
EOSINOPHIL # BLD AUTO: 0.25 10*3/MM3 (ref 0–0.4)
EOSINOPHIL NFR BLD AUTO: 2.5 % (ref 0.3–6.2)
ERYTHROCYTE [DISTWIDTH] IN BLOOD BY AUTOMATED COUNT: 14.2 % (ref 12.3–15.4)
GLOBULIN UR ELPH-MCNC: 3.4 GM/DL
GLUCOSE SERPL-MCNC: 443 MG/DL (ref 65–99)
HCT VFR BLD AUTO: 37 % (ref 37.5–51)
HGB BLD-MCNC: 11.5 G/DL (ref 13–17.7)
HOLD SPECIMEN: NORMAL
HOLD SPECIMEN: NORMAL
IMM GRANULOCYTES # BLD AUTO: 0.06 10*3/MM3 (ref 0–0.05)
IMM GRANULOCYTES NFR BLD AUTO: 0.6 % (ref 0–0.5)
LIPASE SERPL-CCNC: 22 U/L (ref 13–60)
LYMPHOCYTES # BLD AUTO: 1.41 10*3/MM3 (ref 0.7–3.1)
LYMPHOCYTES NFR BLD AUTO: 13.8 % (ref 19.6–45.3)
MCH RBC QN AUTO: 27 PG (ref 26.6–33)
MCHC RBC AUTO-ENTMCNC: 31.1 G/DL (ref 31.5–35.7)
MCV RBC AUTO: 86.9 FL (ref 79–97)
MONOCYTES # BLD AUTO: 0.61 10*3/MM3 (ref 0.1–0.9)
MONOCYTES NFR BLD AUTO: 6 % (ref 5–12)
NEUTROPHILS NFR BLD AUTO: 7.82 10*3/MM3 (ref 1.7–7)
NEUTROPHILS NFR BLD AUTO: 76.6 % (ref 42.7–76)
NRBC BLD AUTO-RTO: 0 /100 WBC (ref 0–0.2)
PLATELET # BLD AUTO: 233 10*3/MM3 (ref 140–450)
PMV BLD AUTO: 11.7 FL (ref 6–12)
POTASSIUM SERPL-SCNC: 4 MMOL/L (ref 3.5–5.2)
PROT SERPL-MCNC: 7.1 G/DL (ref 6–8.5)
RBC # BLD AUTO: 4.26 10*6/MM3 (ref 4.14–5.8)
SODIUM SERPL-SCNC: 136 MMOL/L (ref 136–145)
WBC NRBC COR # BLD: 10.2 10*3/MM3 (ref 3.4–10.8)
WHOLE BLOOD HOLD COAG: NORMAL
WHOLE BLOOD HOLD SPECIMEN: NORMAL

## 2023-07-21 PROCEDURE — 36415 COLL VENOUS BLD VENIPUNCTURE: CPT

## 2023-07-21 PROCEDURE — 85025 COMPLETE CBC W/AUTO DIFF WBC: CPT | Performed by: STUDENT IN AN ORGANIZED HEALTH CARE EDUCATION/TRAINING PROGRAM

## 2023-07-21 PROCEDURE — 83605 ASSAY OF LACTIC ACID: CPT | Performed by: STUDENT IN AN ORGANIZED HEALTH CARE EDUCATION/TRAINING PROGRAM

## 2023-07-21 PROCEDURE — 99283 EMERGENCY DEPT VISIT LOW MDM: CPT

## 2023-07-21 PROCEDURE — 80053 COMPREHEN METABOLIC PANEL: CPT | Performed by: STUDENT IN AN ORGANIZED HEALTH CARE EDUCATION/TRAINING PROGRAM

## 2023-07-21 PROCEDURE — 83690 ASSAY OF LIPASE: CPT | Performed by: STUDENT IN AN ORGANIZED HEALTH CARE EDUCATION/TRAINING PROGRAM

## 2023-07-21 RX ORDER — SODIUM CHLORIDE 0.9 % (FLUSH) 0.9 %
10 SYRINGE (ML) INJECTION AS NEEDED
Status: DISCONTINUED | OUTPATIENT
Start: 2023-07-21 | End: 2023-07-22 | Stop reason: HOSPADM

## 2023-07-22 ENCOUNTER — APPOINTMENT (OUTPATIENT)
Dept: CT IMAGING | Facility: HOSPITAL | Age: 68
End: 2023-07-22
Payer: COMMERCIAL

## 2023-07-22 ENCOUNTER — HOSPITAL ENCOUNTER (EMERGENCY)
Facility: HOSPITAL | Age: 68
Discharge: HOME | End: 2023-07-22
Payer: COMMERCIAL

## 2023-07-22 VITALS
BODY MASS INDEX: 40.63 KG/M2 | WEIGHT: 300 LBS | OXYGEN SATURATION: 100 % | HEART RATE: 72 BPM | SYSTOLIC BLOOD PRESSURE: 160 MMHG | HEIGHT: 72 IN | RESPIRATION RATE: 20 BRPM | TEMPERATURE: 97.5 F | DIASTOLIC BLOOD PRESSURE: 88 MMHG

## 2023-07-22 DIAGNOSIS — R10.84 GENERALIZED ABDOMINAL PAIN: Primary | ICD-10-CM

## 2023-07-22 LAB
BACTERIA UR QL AUTO: ABNORMAL /HPF
BILIRUB UR QL STRIP: NEGATIVE
CLARITY UR: CLEAR
COLOR UR: YELLOW
GLUCOSE BLDC GLUCOMTR-MCNC: 127 MG/DL (ref 70–130)
GLUCOSE UR STRIP-MCNC: ABNORMAL MG/DL
HGB UR QL STRIP.AUTO: ABNORMAL
HOLD SPECIMEN: NORMAL
HYALINE CASTS UR QL AUTO: ABNORMAL /LPF
KETONES UR QL STRIP: NEGATIVE
LEUKOCYTE ESTERASE UR QL STRIP.AUTO: NEGATIVE
NITRITE UR QL STRIP: NEGATIVE
PH UR STRIP.AUTO: 5.5 [PH] (ref 5–8)
PROT UR QL STRIP: ABNORMAL
RBC # UR STRIP: ABNORMAL /HPF
REF LAB TEST METHOD: ABNORMAL
SP GR UR STRIP: 1.02 (ref 1–1.03)
SQUAMOUS #/AREA URNS HPF: ABNORMAL /HPF
UROBILINOGEN UR QL STRIP: ABNORMAL
WBC # UR STRIP: ABNORMAL /HPF

## 2023-07-22 PROCEDURE — 81001 URINALYSIS AUTO W/SCOPE: CPT

## 2023-07-22 PROCEDURE — 96374 THER/PROPH/DIAG INJ IV PUSH: CPT

## 2023-07-22 PROCEDURE — 25010000002 ONDANSETRON PER 1 MG

## 2023-07-22 PROCEDURE — 63710000001 INSULIN REGULAR HUMAN PER 5 UNITS: Performed by: STUDENT IN AN ORGANIZED HEALTH CARE EDUCATION/TRAINING PROGRAM

## 2023-07-22 PROCEDURE — 82948 REAGENT STRIP/BLOOD GLUCOSE: CPT

## 2023-07-22 PROCEDURE — 74177 CT ABD & PELVIS W/CONTRAST: CPT

## 2023-07-22 PROCEDURE — 25510000001 IOPAMIDOL 61 % SOLUTION

## 2023-07-22 PROCEDURE — 96376 TX/PRO/DX INJ SAME DRUG ADON: CPT

## 2023-07-22 RX ORDER — ONDANSETRON 2 MG/ML
4 INJECTION INTRAMUSCULAR; INTRAVENOUS ONCE
Status: COMPLETED | OUTPATIENT
Start: 2023-07-22 | End: 2023-07-22

## 2023-07-22 RX ORDER — ONDANSETRON 4 MG/1
4 TABLET, ORALLY DISINTEGRATING ORAL EVERY 6 HOURS PRN
Qty: 12 TABLET | Refills: 0 | Status: SHIPPED | OUTPATIENT
Start: 2023-07-22

## 2023-07-22 RX ADMIN — ONDANSETRON 4 MG: 2 INJECTION INTRAMUSCULAR; INTRAVENOUS at 03:00

## 2023-07-22 RX ADMIN — INSULIN HUMAN 5 UNITS: 100 INJECTION, SOLUTION PARENTERAL at 01:10

## 2023-07-22 RX ADMIN — SODIUM CHLORIDE, POTASSIUM CHLORIDE, SODIUM LACTATE AND CALCIUM CHLORIDE 1000 ML: 600; 310; 30; 20 INJECTION, SOLUTION INTRAVENOUS at 01:14

## 2023-07-22 RX ADMIN — ONDANSETRON 4 MG: 2 INJECTION INTRAMUSCULAR; INTRAVENOUS at 01:13

## 2023-07-22 RX ADMIN — IOPAMIDOL 100 ML: 612 INJECTION, SOLUTION INTRAVENOUS at 01:53

## 2023-07-22 NOTE — ED PROVIDER NOTES
"Subjective   History of Present Illness  Patient is a 68-year-old male that reports abdominal pain, nausea that started today.  Patient's wife is with him and states he has significant health history including CAD chronic constipation, hypertension, hyperlipidemia, diabetes.      Review of Systems    Past Medical History:   Diagnosis Date    Arthritis     Autonomic disease     CAD (coronary artery disease) 02/06/2017    Cervical radiculopathy 09/16/2021    Chronic constipation with acute exaccerbation 05/10/2021    Coronary artery disease     Degeneration of cervical intervertebral disc 08/11/2021    Diabetes mellitus     Diabetic foot ulcer 08/31/2020    Diabetic polyneuropathy associated with type 2 diabetes mellitus 01/18/2021    Elevated cholesterol     Gastroesophageal reflux disease 05/13/2019    Headache     HTN (hypertension), benign 05/03/2017    Hyperlipidemia     Hypertension     Mixed hyperlipidemia 02/07/2017    Multiple lung nodules 01/26/2020    5mm, 9 mm RLL identified 1/2020, not present 10/2019.    Myocardial infarction     Osteomyelitis 01/22/2020    Osteomyelitis of fifth toe of right foot 10/07/2019    Pancreatitis     Persistent insomnia 01/20/2020    Renal disorder     Sleep apnea 02/06/2017    Sleep apnea with use of continuous positive airway pressure (CPAP)     NON-COMPLIANT    Slow transit constipation 01/16/2019    Spinal stenosis in cervical region 09/16/2021    Vitamin D deficiency 03/02/2021       Allergies   Allergen Reactions    Cefepime Hives and Anaphylaxis    Bactrim [Sulfamethoxazole-Trimethoprim] Other (See Comments)     \"RENAL FAILURE\"    Vancomycin Itching    Zolpidem Unknown - High Severity     \"makes him crazy\"    Zolpidem Tartrate Unknown - Low Severity and Provider Review Needed    Metronidazole Rash       Past Surgical History:   Procedure Laterality Date    ABDOMINAL SURGERY      AMPUTATION FOOT / TOE Left 10/2021    5th digit     ANTERIOR CERVICAL DISCECTOMY W/ FUSION " N/A 2022    Procedure: CERVICAL DISCECTOMY ANTERIOR WITH FUSION C5-6 with possible plating of C5-7 with neuromonitoring and 1 c-arm;  Surgeon: Karel Soliz MD;  Location:  [facility] OR;  Service: Neurosurgery;  Laterality: N/A;    APPENDECTOMY      BACK SURGERY      CARDIAC CATHETERIZATION Left 2021    Procedure: Left Heart Cath w poss intervention left anatomical snuff box acess;  Surgeon: Omkar Charles DO;  Location:  [facility] CATH INVASIVE LOCATION;  Service: Cardiology;  Laterality: Left;    CARDIAC SURGERY      CATARACT EXTRACTION      CERVICAL SPINE SURGERY      COLONOSCOPY N/A 2017    Normal exam repeat in 5 years    COLONOSCOPY N/A 2019    Mild acute inflammation    COLONOSCOPY W/ POLYPECTOMY  2014    Hyperplastic polyp    CORONARY ARTERY BYPASS GRAFT  10/2015    ENDOSCOPY  2011    Gastritis with hemorrhage    ENDOSCOPY N/A 2017    Normal exam    ENDOSCOPY N/A 2019    Gastritis    ENDOSCOPY N/A 2020    Non-erosive gastritis with hemorrhage    ENDOSCOPY N/A 02/10/2021    Esophagitis    FOOT SURGERY Left     INCISION AND DRAINAGE OF WOUND Left 2007    spider bite       Family History   Problem Relation Age of Onset    Colon cancer Father     Heart disease Father     Colon cancer Sister     Colon polyps Sister     Alzheimer's disease Mother     Coronary artery disease Sister     Coronary artery disease Sister        Social History     Socioeconomic History    Marital status:    Tobacco Use    Smoking status: Former     Types: Cigarettes     Quit date:      Years since quittin.5    Smokeless tobacco: Never    Tobacco comments:     smoked in highschool   Vaping Use    Vaping Use: Never used   Substance and Sexual Activity    Alcohol use: No    Drug use: No    Sexual activity: Defer           Objective   Physical Exam    Procedures           ED Course                                           Medical Decision Making  Amount and/or  Complexity of Data Reviewed  Radiology: ordered.    Risk  Prescription drug management.        Final diagnoses:   None       ED Disposition  ED Disposition       None            No follow-up provider specified.       Medication List      No changes were made to your prescriptions during this visit.          male who presents to the ED for abdominal pain, nausea that started today.  Patient's wife is with him and states he has significant health history including CAD chronic constipation, hypertension, hyperlipidemia, diabetes.  Patient denies constipation, denies diarrhea, patient denies vomiting.  Patient states pain upper abdominal pain.  Denies being febrile, patient states pain has only been going on for about 8 hours at this time.  .     Patient was non-toxic appearing on arrival. No acute distress was noted. Past medical history, surgical history, and medication regimen reviewed.     Vital signs been stable and afebrile.     Viewed patient's past medical records in epic    Patient's presentation raises suspicion for differentials including, but not limited to, small bowel obstruction, cholecystitis, appendicitis.     Given this, Erick was placed on the monitor and IV access was obtained as needed. Laboratory studies and imaging studies were ordered.     Please refer to ED course for labs and imaging results.    CT Abdomen Pelvis With Contrast   Final Result         1.  No acute abdominopelvic finding.         2.  Moderate volume stool throughout the colon. Correlate for    constipation.         3.  Unchanged 8 mm RIGHT lower lobe pulmonary nodule.    This report was finalized on 07/22/2023 07:13 by Dr. Santana Figueredo MD.      Labs Reviewed  COMPREHENSIVE METABOLIC PANEL - Abnormal; Notable for the following components:     Glucose                       443 (*)                BUN                           42 (*)                 Creatinine                    2.08 (*)               eGFR                          34.3 (*)            All other components within normal limits         Narrative: GFR Normal >60                  Chronic Kidney Disease <60                  Kidney Failure <15                    CBC WITH AUTO DIFFERENTIAL - Abnormal; Notable for the following components:     Hemoglobin                    11.5  (*)               Hematocrit                    37.0 (*)               MCHC                          31.1 (*)               Neutrophil %                  76.6 (*)               Lymphocyte %                  13.8 (*)               Immature Grans %              0.6 (*)                Neutrophils, Absolute         7.82 (*)               Immature Grans, Absolute      0.06 (*)            All other components within normal limits  URINALYSIS W/ CULTURE IF INDICATED - Abnormal; Notable for the following components:     Glucose, UA                     (*)                  Blood, UA                     Trace (*)               Protein, UA                     (*)               All other components within normal limits         Narrative: In absence of clinical symptoms, the presence of pyuria, bacteria, and/or nitrites on the urinalysis result does not correlate with infection.  URINALYSIS, MICROSCOPIC ONLY - Abnormal; Notable for the following components:     RBC, UA                       0-2 (*)                WBC, UA                       0-2 (*)             All other components within normal limits  LIPASE - Normal  LACTIC ACID, PLASMA - Normal  POCT GLUCOSE FINGERSTICK - Normal  RAINBOW DRAW         Narrative: The following orders were created for panel order [facility] Draw.                  Procedure                               Abnormality         Status                                     ---------                               -----------         ------                                     Green Top (Gel)[[id number]]                                  Final result                               Lavender Top[[id number]]                                     Final result                               Red Top[[id number]]                                          Final result                               North Top[[id number]]                                         Final result                               Light Blue Top[[id number]]                                    Final result                                                 Please view results for these tests on the individual orders.  CBC AND DIFFERENTIAL         Narrative: The following orders were created for panel order CBC & Differential.                  Procedure                               Abnormality         Status                                     ---------                               -----------         ------                                     CBC Auto Differential[[id number]]        Abnormal            Final result                                                 Please view results for these tests on the individual orders.  GREEN TOP  LAVENDER TOP  RED TOP  GRAY TOP  LIGHT BLUE TOP     Medications  lactated ringers bolus 1,000 mL (0 mL Intravenous Stopped 7/22/23 0239)  insulin regular (humuLIN R,novoLIN R) injection 5 Units (5 Units Intravenous Given 7/22/23 0110)  ondansetron (ZOFRAN) injection 4 mg (4 mg Intravenous Given 7/22/23 0113)  iopamidol (ISOVUE-300) 61 % injection 100 mL (100 mL Intravenous Given 7/22/23 0153)  ondansetron (ZOFRAN) injection 4 mg (4 mg Intravenous Given 7/22/23 0300) On re-evaluation, patient remained hemodynamically stable.       Given findings described above, patient's presentation is likely consistent with constipation. I have a low suspicion for small bowel obstruction, gastroenteritis, cholecystitis, appendectomy after reviewing imaging and at this point in their ED course.        Shared decision making: Discussed with patient about discharge home, patient states that he is agreeable to discharge home and will take MiraLAX that he does have at home to treat constipation        I went over the workup and results so far with patient and wife in the room. I told them that that patient shows to be constipated no other acute illnesses at this.     I answered all the questions regarding the emergency department evaluation, diagnosis, and  treatment plan in plain and simple language that was understandable.     I said that there is always some diagnostic uncertainty in the ER and went over the fact that the symptoms may change or new symptoms may reveal themselves after being discharged.     Because of this, I said that it is important that Erick follows up, by calling as soon as possible to set up an appointment, with the primary care doctor within the next few days or as soon as reasonably possible so that the symptoms can be re-evaluated for improvement or for any other questions.            Problems Addressed:  Generalized abdominal pain: complicated acute illness or injury    Amount and/or Complexity of Data Reviewed  Radiology: ordered.    Risk  Prescription drug management.        Final diagnoses:   Generalized abdominal pain       ED Disposition  ED Disposition       ED Disposition   Discharge    Condition   Stable    Comment   --               Del Shetty MD  [address] [facility] [id number] [phone]    Schedule an appointment as soon as possible for a visit       [facility]  [address] [place] [id number] [phone]    If symptoms worsen         Medication List        New Prescriptions      ondansetron ODT 4 MG disintegrating tablet  Commonly known as: ZOFRAN-ODT  Place 1 tablet on the tongue Every 6 (Six) Hours As Needed for Nausea or Vomiting.               Where to Get Your Medications        These medications were sent to [facility]pharm[facility] #[id number] - [place], [place] - [address] - [phone]  - [phone] FX  [address] [facility] [address]      Phone: [phone]   ondansetron ODT 4 MG disintegrating tablet            Eve James, APRN  07/28/23 0411

## 2023-08-03 ENCOUNTER — OFFICE VISIT (OUTPATIENT)
Dept: CARDIOLOGY | Facility: CLINIC | Age: 68
End: 2023-08-03
Payer: COMMERCIAL

## 2023-08-03 VITALS
HEART RATE: 81 BPM | OXYGEN SATURATION: 99 % | DIASTOLIC BLOOD PRESSURE: 68 MMHG | HEIGHT: 72 IN | BODY MASS INDEX: 41.99 KG/M2 | SYSTOLIC BLOOD PRESSURE: 150 MMHG | WEIGHT: 310 LBS

## 2023-08-03 DIAGNOSIS — I25.10 CORONARY ARTERY DISEASE INVOLVING NATIVE CORONARY ARTERY OF NATIVE HEART WITHOUT ANGINA PECTORIS: Primary | ICD-10-CM

## 2023-08-03 DIAGNOSIS — I50.32 CHRONIC DIASTOLIC HEART FAILURE: ICD-10-CM

## 2023-08-03 PROCEDURE — 1160F RVW MEDS BY RX/DR IN RCRD: CPT | Performed by: INTERNAL MEDICINE

## 2023-08-03 PROCEDURE — 93000 ELECTROCARDIOGRAM COMPLETE: CPT | Performed by: INTERNAL MEDICINE

## 2023-08-03 PROCEDURE — 3077F SYST BP >= 140 MM HG: CPT | Performed by: INTERNAL MEDICINE

## 2023-08-03 PROCEDURE — 3078F DIAST BP <80 MM HG: CPT | Performed by: INTERNAL MEDICINE

## 2023-08-03 PROCEDURE — 99214 OFFICE O/P EST MOD 30 MIN: CPT | Performed by: INTERNAL MEDICINE

## 2023-08-03 PROCEDURE — 1159F MED LIST DOCD IN RCRD: CPT | Performed by: INTERNAL MEDICINE

## 2023-08-10 ENCOUNTER — OFFICE VISIT (OUTPATIENT)
Dept: NEUROSURGERY | Facility: CLINIC | Age: 68
End: 2023-08-10
Payer: COMMERCIAL

## 2023-08-10 VITALS — WEIGHT: 315 LBS | HEIGHT: 72 IN | BODY MASS INDEX: 42.66 KG/M2

## 2023-08-10 DIAGNOSIS — E66.01 CLASS 3 SEVERE OBESITY DUE TO EXCESS CALORIES WITH SERIOUS COMORBIDITY AND BODY MASS INDEX (BMI) OF 40.0 TO 44.9 IN ADULT: ICD-10-CM

## 2023-08-10 DIAGNOSIS — Z78.9 NONSMOKER: ICD-10-CM

## 2023-08-10 DIAGNOSIS — G56.02 LEFT CARPAL TUNNEL SYNDROME: Primary | ICD-10-CM

## 2023-08-10 PROCEDURE — 99213 OFFICE O/P EST LOW 20 MIN: CPT | Performed by: NEUROLOGICAL SURGERY

## 2023-08-10 NOTE — PROGRESS NOTES
SUBJECTIVE:  Patient ID: Erick Luong is a 68 y.o. male is here today for follow-up.    Chief Complaint: Carpal tunnel syndrome  Chief Complaint   Patient presents with    Neck Pain     Patient underwent cervical fusion on 8/5/22.  He continues to have neck pain and RT shoulder pain.  He has bilateral hand numbness & tingling.  Today patient states he finished the therapy and his symptoms have improved (neck & shoulder) but he continues to have the hand numbness/tingling.       HPI  68-year-old gentleman went to the operating room on August 5, 2022 for a C5-6 anterior cervical fusion which was an extension from a previous fusion at C6-7.  He had some recurrent right paraspinal neck pain but has done well with therapy and right now his neck is not bothering him he said he does not describe any radicular arm pain.  He describes severe numbness and tingling that is pretty much constant in his hands.  The left is worse than the right gets worse during the day with activities and it is worse at night.  He has an EMG nerve conduction study which supports severe bilateral carpal tunnel syndrome as well as peripheral neuropathy.    The following portions of the patient's history were reviewed and updated as appropriate: allergies, current medications, past family history, past medical history, past social history, past surgical history and problem list.    OBJECTIVE:    Review of Systems   Neurological:  Positive for numbness.   All other systems reviewed and are negative.       Physical Exam  Eyes:      Extraocular Movements: EOM normal.      Pupils: Pupils are equal, round, and reactive to light.   Neurological:      Mental Status: He is oriented to person, place, and time.      Motor: Motor strength is normal.      Coordination: Finger-Nose-Finger Test normal.      Gait: Gait is intact.   Psychiatric:         Speech: Speech normal.       Neurologic Exam     Mental Status   Oriented to person, place, and time.    Attention: normal.   Speech: speech is normal   Level of consciousness: alert  Knowledge: good.     Cranial Nerves     CN II   Visual fields full to confrontation.     CN III, IV, VI   Pupils are equal, round, and reactive to light.  Extraocular motions are normal.     CN V   Facial sensation intact.     CN VII   Facial expression full, symmetric.     CN VIII   CN VIII normal.     CN IX, X   CN IX normal.   CN X normal.     CN XI   CN XI normal.     CN XII   CN XII normal.     Motor Exam   Muscle bulk: normal  Overall muscle tone: normal  Right arm pronator drift: absent  Left arm pronator drift: absent    Strength   Strength 5/5 throughout.     Sensory Exam   Light touch normal.   Pinprick normal.     Gait, Coordination, and Reflexes     Gait  Gait: normal    Coordination   Finger to nose coordination: normal    Tremor   Resting tremor: absent  Intention tremor: absent  Action tremor: absent    Reflexes   Reflexes 2+ except as noted.     Independent Review of Radiographic Studies:       ASSESSMENT/PLAN:  The patient has bilateral carpal tunnel syndrome which is graded as severe.  He is clinically also badly affected by this.  I would recommend left carpal tunnel release surgery first.  If he does well from that proceed with right carpal tunnel release in a staged fashion.  The risk and benefits were reviewed which included but were not limited to infection, bleeding, damage to the median nerve resulting in permanent pain numbness tingling and weakness.  In addition I did explain to the patient that he also does have a diagnosis of peripheral neuropathy related to his diabetes which could be contributing to the numbness in his hands.  He acknowledged understand this.  His questions and concerns were addressed.      1. Left carpal tunnel syndrome    2. Nonsmoker    3. Class 3 severe obesity due to excess calories with serious comorbidity and body mass index (BMI) of 40.0 to 44.9 in adult        The patient's Body  mass index is 43.94 kg/mý.. BMI is above normal parameters. Recommendations include: educational material    Return for POSTOPERATIVELY.      Karel Soliz MD

## 2023-08-10 NOTE — PATIENT INSTRUCTIONS
"PATIENT TO CONTINUE TO FOLLOW UP WITH HIS PRIMARY CARE PROVIDER FOR YEARLY PHYSICAL EXAMS TO ENSURE COMPLETE HEALTH MAINTENANCE      BMI for Adults  What is BMI?  Body mass index (BMI) is a number that is calculated from a person's weight and height. BMI can help estimate how much of a person's weight is composed of fat. BMI does not measure body fat directly. Rather, it is an alternative to procedures that directly measure body fat, which can be difficult and expensive.  BMI can help identify people who may be at higher risk for certain medical problems.  What are BMI measurements used for?  BMI is used as a screening tool to identify possible weight problems. It helps determine whether a person is obese, overweight, a healthy weight, or underweight.  BMI is useful for:  Identifying a weight problem that may be related to a medical condition or may increase the risk for medical problems.  Promoting changes, such as changes in diet and exercise, to help reach a healthy weight. BMI screening can be repeated to see if these changes are working.  How is BMI calculated?  BMI involves measuring your weight in relation to your height. Both height and weight are measured, and the BMI is calculated from those numbers. This can be done either in English ([place] or metric measurements. Note that charts and online BMI calculators are available to help you find your BMI quickly and easily without having to do these calculations yourself.  To calculate your BMI in English ([place] measurements:    Measure your weight in pounds (lb).  Multiply the number of pounds by 703.  For example, for a person who weighs 180 lb, multiply that number by 703, which equals 126,540.  Measure your height in inches. Then multiply that number by itself to get a measurement called \"inches squared.\"  For example, for a person who is 70 inches tall, the \"inches squared\" measurement is 70 inches x 70 inches, which equals 4,900 inches squared.  Divide the " "total from step 2 (number of lb x 703) by the total from step 3 (inches squared): 126,540 ö 4,900 = 25.8. This is your BMI.  To calculate your BMI in metric measurements:  Measure your weight in kilograms (kg).  Measure your height in meters (m). Then multiply that number by itself to get a measurement called \"meters squared.\"  For example, for a person who is 1.75 m tall, the \"meters squared\" measurement is 1.75 m x 1.75 m, which is equal to 3.1 meters squared.  Divide the number of kilograms (your weight) by the meters squared number. In this example: 70 ö 3.1 = 22.6. This is your BMI.  What do the results mean?  BMI charts are used to identify whether you are underweight, normal weight, overweight, or obese. The following guidelines will be used:  Underweight: BMI less than 18.5.  Normal weight: BMI between 18.5 and 24.9.  Overweight: BMI between 25 and 29.9.  Obese: BMI of 30 or above.  Keep these notes in mind:  Weight includes both fat and muscle, so someone with a muscular build, such as an athlete, may have a BMI that is higher than 24.9. In cases like these, BMI is not an accurate measure of body fat.  To determine if excess body fat is the cause of a BMI of 25 or higher, further assessments may need to be done by a health care provider.  BMI is usually interpreted in the same way for men and women.  Where to find more information  For more information about BMI, including tools to quickly calculate your BMI, go to these websites:  Centers for Disease Control and Prevention: www.cdc.gov  American Heart Association: www.heart.org  National Heart, Lung, and Blood [place]: www.nhlbi.nih.gov  Summary  Body mass index (BMI) is a number that is calculated from a person's weight and height.  BMI may help estimate how much of a person's weight is composed of fat. BMI can help identify those who may be at higher risk for certain medical problems.  BMI can be measured using English measurements or metric " "measurements.  BMI charts are used to identify whether you are underweight, normal weight, overweight, or obese.  This information is not intended to replace advice given to you by your health care provider. Make sure you discuss any questions you have with your health care provider.  Document Revised: 09/09/2020 Document Reviewed: 07/17/2020  [employer] Patient Education c 2023 [employer]  DASH Eating Plan  DASH stands for Dietary Approaches to Stop Hypertension. The DASH eating plan is a healthy eating plan that has been shown to:  Reduce high blood pressure (hypertension).  Reduce your risk for type 2 diabetes, heart disease, and stroke.  Help with weight loss.  What are tips for following this plan?  Reading food labels  Check food labels for the amount of salt (sodium) per serving. Choose foods with less than 5 percent of the Daily Value of sodium. Generally, foods with less than 300 milligrams (mg) of sodium per serving fit into this eating plan.  To find whole grains, look for the word \"whole\" as the first word in the ingredient list.  Shopping  Buy products labeled as \"low-sodium\" or \"no salt added.\"  Buy fresh foods. Avoid canned foods and pre-made or frozen meals.  Cooking  Avoid adding salt when cooking. Use salt-free seasonings or herbs instead of table salt or sea salt. Check with your health care provider or pharmacist before using salt substitutes.  Do not vera foods. Cook foods using healthy methods such as baking, boiling, grilling, roasting, and broiling instead.  Cook with heart-healthy oils, such as olive, canola, avocado, soybean, or sunflower oil.  Meal planning    Eat a balanced diet that includes:  4 or more servings of fruits and 4 or more servings of vegetables each day. Try to fill one-half of your plate with fruits and vegetables.  6-8 servings of whole grains each day.  Less than 6 oz (170 g) of lean meat, poultry, or fish each day. A 3-oz (85-g) serving of meat is about the same size as " a deck of cards. One egg equals 1 oz (28 g).  2-3 servings of low-fat dairy each day. One serving is 1 cup (237 mL).  1 serving of nuts, seeds, or beans 5 times each week.  2-3 servings of heart-healthy fats. Healthy fats called omega-3 fatty acids are found in foods such as walnuts, flaxseeds, fortified milks, and eggs. These fats are also found in cold-water fish, such as sardines, salmon, and mackerel.  Limit how much you eat of:  Canned or prepackaged foods.  Food that is high in trans fat, such as some fried foods.  Food that is high in saturated fat, such as fatty meat.  Desserts and other sweets, sugary drinks, and other foods with added sugar.  Full-fat dairy products.  Do not salt foods before eating.  Do not eat more than 4 egg yolks a week.  Try to eat at least 2 vegetarian meals a week.  Eat more home-cooked food and less restaurant, buffet, and fast food.  Lifestyle  When eating at a restaurant, ask that your food be prepared with less salt or no salt, if possible.  If you drink alcohol:  Limit how much you use to:  0-1 drink a day for women who are not pregnant.  0-2 drinks a day for men.  Be aware of how much alcohol is in your drink. In the [place] one drink equals one 12 oz bottle of beer (355 mL), one 5 oz glass of wine (148 mL), or one 1« oz glass of hard liquor (44 mL).  General information  Avoid eating more than 2,300 mg of salt a day. If you have hypertension, you may need to reduce your sodium intake to 1,500 mg a day.  Work with your health care provider to maintain a healthy body weight or to lose weight. Ask what an ideal weight is for you.  Get at least 30 minutes of exercise that causes your heart to beat faster (aerobic exercise) most days of the week. Activities may include walking, swimming, or biking.  Work with your health care provider or dietitian to adjust your eating plan to your individual calorie needs.  What foods should I eat?  Fruits  All fresh, dried, or frozen fruit. Canned  fruit in natural juice (without added sugar).  Vegetables  Fresh or frozen vegetables (raw, steamed, roasted, or grilled). Low-sodium or reduced-sodium tomato and vegetable juice. Low-sodium or reduced-sodium tomato sauce and tomato paste. Low-sodium or reduced-sodium canned vegetables.  Grains  Whole-grain or whole-wheat bread. Whole-grain or whole-wheat pasta. Brown rice. Oatmeal. Quinoa. Bulgur. Whole-grain and low-sodium cereals. Radha bread. Low-fat, low-sodium crackers. Whole-wheat flour tortillas.  Meats and other proteins  Skinless chicken or turkey. Ground chicken or turkey. Pork with fat trimmed off. Fish and seafood. Egg whites. Dried beans, peas, or lentils. Unsalted nuts, nut butters, and seeds. Unsalted canned beans. Lean cuts of beef with fat trimmed off. Low-sodium, lean precooked or cured meat, such as sausages or meat loaves.  Dairy  Low-fat (1%) or fat-free (skim) milk. Reduced-fat, low-fat, or fat-free cheeses. Nonfat, low-sodium ricotta or cottage cheese. Low-fat or nonfat yogurt. Low-fat, low-sodium cheese.  Fats and oils  Soft margarine without trans fats. Vegetable oil. Reduced-fat, low-fat, or light mayonnaise and salad dressings (reduced-sodium). Canola, safflower, olive, avocado, soybean, and sunflower oils. Avocado.  Seasonings and condiments  Herbs. Spices. Seasoning mixes without salt.  Other foods  Unsalted popcorn and pretzels. Fat-free sweets.  The items listed above may not be a complete list of foods and beverages you can eat. Contact a dietitian for more information.  What foods should I avoid?  Fruits  Canned fruit in a light or heavy syrup. Fried fruit. Fruit in cream or butter sauce.  Vegetables  Creamed or fried vegetables. Vegetables in a cheese sauce. Regular canned vegetables (not low-sodium or reduced-sodium). Regular canned tomato sauce and paste (not low-sodium or reduced-sodium). Regular tomato and vegetable juice (not low-sodium or reduced-sodium). Pickles.  Olives.  Grains  Baked goods made with fat, such as croissants, muffins, or some breads. Dry pasta or rice meal packs.  Meats and other proteins  Fatty cuts of meat. Ribs. Fried meat. Morfin. Bologna, salami, and other precooked or cured meats, such as sausages or meat loaves. Fat from the back of a pig (fatback). Bratwurst. Salted nuts and seeds. Canned beans with added salt. Canned or smoked fish. Whole eggs or egg yolks. Chicken or turkey with skin.  Dairy  Whole or 2% milk, cream, and half-and-half. Whole or full-fat cream cheese. Whole-fat or sweetened yogurt. Full-fat cheese. Nondairy creamers. Whipped toppings. Processed cheese and cheese spreads.  Fats and oils  Butter. Stick margarine. Lard. Shortening. Ghee. Morfin fat. Tropical oils, such as coconut, palm kernel, or palm oil.  Seasonings and condiments  Onion salt, garlic salt, seasoned salt, table salt, and sea salt. [facility] sauce. Tartar sauce. Barbecue sauce. Teriyaki sauce. Soy sauce, including reduced-sodium. Steak sauce. Canned and packaged gravies. Fish sauce. Oyster sauce. Cocktail sauce. Store-bought horseradish. Ketchup. Mustard. Meat flavorings and tenderizers. Bouillon cubes. Hot sauces. Pre-made or packaged marinades. Pre-made or packaged taco seasonings. Relishes. Regular salad dressings.  Other foods  Salted popcorn and pretzels.  The items listed above may not be a complete list of foods and beverages you should avoid. Contact a dietitian for more information.  Where to find more information  National Heart, Lung, and Blood [place]: www.nhlbi.nih.gov  American Heart Association: www.heart.org  Academy of Nutrition and Dietetics: www.eatright.org  National Kidney Foundation: www.kidney.org  Summary  The DASH eating plan is a healthy eating plan that has been shown to reduce high blood pressure (hypertension). It may also reduce your risk for type 2 diabetes, heart disease, and stroke.  When on the DASH eating plan, aim to eat more  fresh fruits and vegetables, whole grains, lean proteins, low-fat dairy, and heart-healthy fats.  With the DASH eating plan, you should limit salt (sodium) intake to 2,300 mg a day. If you have hypertension, you may need to reduce your sodium intake to 1,500 mg a day.  Work with your health care provider or dietitian to adjust your eating plan to your individual calorie needs.  This information is not intended to replace advice given to you by your health care provider. Make sure you discuss any questions you have with your health care provider.  Document Revised: 11/20/2020 Document Reviewed: 11/20/2020  [employer] Patient Education c 2023 [employer]

## 2023-08-16 ENCOUNTER — LAB (OUTPATIENT)
Dept: LAB | Facility: HOSPITAL | Age: 68
End: 2023-08-16
Payer: COMMERCIAL

## 2023-08-16 ENCOUNTER — TRANSCRIBE ORDERS (OUTPATIENT)
Dept: ADMINISTRATIVE | Facility: HOSPITAL | Age: 68
End: 2023-08-16
Payer: COMMERCIAL

## 2023-08-16 DIAGNOSIS — R11.2 NAUSEA AND VOMITING, UNSPECIFIED VOMITING TYPE: ICD-10-CM

## 2023-08-16 DIAGNOSIS — G56.02 LEFT CARPAL TUNNEL SYNDROME: ICD-10-CM

## 2023-08-16 DIAGNOSIS — E11.42 TYPE 2 DIABETES MELLITUS WITH DIABETIC POLYNEUROPATHY, UNSPECIFIED WHETHER LONG TERM INSULIN USE: Primary | ICD-10-CM

## 2023-08-16 DIAGNOSIS — E11.42 TYPE 2 DIABETES MELLITUS WITH DIABETIC POLYNEUROPATHY, UNSPECIFIED WHETHER LONG TERM INSULIN USE: ICD-10-CM

## 2023-08-16 LAB
ALBUMIN SERPL-MCNC: 3.7 G/DL (ref 3.5–5)
ALBUMIN/GLOB SERPL: 1.1 G/DL (ref 1.1–2.5)
ALP SERPL-CCNC: 84 U/L (ref 24–120)
ALT SERPL W P-5'-P-CCNC: 20 U/L (ref 0–50)
AMYLASE SERPL-CCNC: 132 U/L (ref 30–110)
ANION GAP SERPL CALCULATED.3IONS-SCNC: 7 MMOL/L (ref 4–13)
AST SERPL-CCNC: 25 U/L (ref 7–45)
AUTO MIXED CELLS #: 0.9 10*3/MM3 (ref 0.1–2.6)
AUTO MIXED CELLS %: 10 % (ref 0.1–24)
BACTERIA UR QL AUTO: ABNORMAL /HPF
BILIRUB SERPL-MCNC: 0.6 MG/DL (ref 0.1–1)
BILIRUB UR QL STRIP: NEGATIVE
BUN SERPL-MCNC: 27 MG/DL (ref 5–21)
BUN/CREAT SERPL: 16.2
CALCIUM SPEC-SCNC: 8.7 MG/DL (ref 8.4–10.4)
CHLORIDE SERPL-SCNC: 103 MMOL/L (ref 98–110)
CLARITY UR: CLEAR
CO2 SERPL-SCNC: 29 MMOL/L (ref 24–31)
COLOR UR: YELLOW
CREAT SERPL-MCNC: 1.67 MG/DL (ref 0.5–1.4)
EGFRCR SERPLBLD CKD-EPI 2021: 44.6 ML/MIN/1.73
ERYTHROCYTE [DISTWIDTH] IN BLOOD BY AUTOMATED COUNT: 14.3 % (ref 12.3–15.4)
GLOBULIN UR ELPH-MCNC: 3.3 GM/DL
GLUCOSE SERPL-MCNC: 210 MG/DL (ref 70–100)
GLUCOSE UR STRIP-MCNC: ABNORMAL MG/DL
HBA1C MFR BLD: 10.1 % (ref 4.8–5.9)
HCT VFR BLD AUTO: 34.7 % (ref 37.5–51)
HGB BLD-MCNC: 11.1 G/DL (ref 13–17.7)
HGB UR QL STRIP.AUTO: ABNORMAL
HYALINE CASTS UR QL AUTO: ABNORMAL /LPF
KETONES UR QL STRIP: NEGATIVE
LEUKOCYTE ESTERASE UR QL STRIP.AUTO: NEGATIVE
LIPASE SERPL-CCNC: 190 U/L (ref 23–203)
LYMPHOCYTES # BLD AUTO: 1.4 10*3/MM3 (ref 0.7–3.1)
LYMPHOCYTES NFR BLD AUTO: 16.7 % (ref 19.6–45.3)
MCH RBC QN AUTO: 27.7 PG (ref 26.6–33)
MCHC RBC AUTO-ENTMCNC: 32 G/DL (ref 31.5–35.7)
MCV RBC AUTO: 86.5 FL (ref 79–97)
NEUTROPHILS NFR BLD AUTO: 6.2 10*3/MM3 (ref 1.7–7)
NEUTROPHILS NFR BLD AUTO: 73.3 % (ref 42.7–76)
NITRITE UR QL STRIP: NEGATIVE
PH UR STRIP.AUTO: 5.5 [PH] (ref 5–8)
PLATELET # BLD AUTO: 266 10*3/MM3 (ref 140–450)
PMV BLD AUTO: 9.9 FL (ref 6–12)
POTASSIUM SERPL-SCNC: 4.4 MMOL/L (ref 3.5–5.3)
PROT SERPL-MCNC: 7 G/DL (ref 6.3–8.7)
PROT UR QL STRIP: ABNORMAL
RBC # BLD AUTO: 4.01 10*6/MM3 (ref 4.14–5.8)
RBC # UR STRIP: ABNORMAL /HPF
REF LAB TEST METHOD: ABNORMAL
SODIUM SERPL-SCNC: 139 MMOL/L (ref 135–145)
SP GR UR STRIP: 1.02 (ref 1–1.03)
SQUAMOUS #/AREA URNS HPF: ABNORMAL /HPF
UROBILINOGEN UR QL STRIP: ABNORMAL
WBC # UR STRIP: ABNORMAL /HPF
WBC NRBC COR # BLD: 8.5 10*3/MM3 (ref 3.4–10.8)

## 2023-08-16 PROCEDURE — 85025 COMPLETE CBC W/AUTO DIFF WBC: CPT

## 2023-08-16 PROCEDURE — 82150 ASSAY OF AMYLASE: CPT

## 2023-08-16 PROCEDURE — 80053 COMPREHEN METABOLIC PANEL: CPT

## 2023-08-16 PROCEDURE — 81001 URINALYSIS AUTO W/SCOPE: CPT

## 2023-08-16 PROCEDURE — 83690 ASSAY OF LIPASE: CPT

## 2023-08-16 PROCEDURE — 83036 HEMOGLOBIN GLYCOSYLATED A1C: CPT

## 2023-08-16 PROCEDURE — 36415 COLL VENOUS BLD VENIPUNCTURE: CPT

## 2023-08-17 ENCOUNTER — DOCUMENTATION (OUTPATIENT)
Dept: ENDOCRINOLOGY | Facility: CLINIC | Age: 68
End: 2023-08-17
Payer: COMMERCIAL

## 2023-08-17 NOTE — PROGRESS NOTES
RECEIVED ORDERS FOR PUMP SUPPLIES SENT BACK TO THE COMPANY THAT [employer] HAS NOT BEEN SEEN SINCE 2021 AND WOULD NEED ANOTHER APPOINTMENT BEFORE THIS CAN BE FILLED OUT.

## 2023-08-21 ENCOUNTER — APPOINTMENT (OUTPATIENT)
Dept: GENERAL RADIOLOGY | Facility: HOSPITAL | Age: 68
End: 2023-08-21
Payer: COMMERCIAL

## 2023-08-21 ENCOUNTER — APPOINTMENT (OUTPATIENT)
Dept: CT IMAGING | Facility: HOSPITAL | Age: 68
End: 2023-08-21
Payer: COMMERCIAL

## 2023-08-21 ENCOUNTER — HOSPITAL ENCOUNTER (OUTPATIENT)
Facility: HOSPITAL | Age: 68
Setting detail: OBSERVATION
Discharge: HOME | End: 2023-08-23
Attending: FAMILY MEDICINE | Admitting: FAMILY MEDICINE
Payer: COMMERCIAL

## 2023-08-21 DIAGNOSIS — R55 VASOVAGAL SYNCOPE: ICD-10-CM

## 2023-08-21 DIAGNOSIS — R07.9 CHEST PAIN, UNSPECIFIED TYPE: Primary | ICD-10-CM

## 2023-08-21 DIAGNOSIS — R73.9 HYPERGLYCEMIA: ICD-10-CM

## 2023-08-21 PROBLEM — R11.0 NAUSEA IN ADULT: Status: ACTIVE | Noted: 2023-08-21

## 2023-08-21 PROBLEM — I10 POORLY-CONTROLLED HYPERTENSION: Status: ACTIVE | Noted: 2023-08-21

## 2023-08-21 LAB
A-A DO2: 36.1 MMHG
ACETONE BLD QL: NEGATIVE
ALBUMIN SERPL-MCNC: 4.2 G/DL (ref 3.5–5.2)
ALBUMIN/GLOB SERPL: 1.4 G/DL
ALP SERPL-CCNC: 85 U/L (ref 39–117)
ALT SERPL W P-5'-P-CCNC: 11 U/L (ref 1–41)
ANION GAP SERPL CALCULATED.3IONS-SCNC: 13 MMOL/L (ref 5–15)
APTT PPP: 27.8 SECONDS (ref 24.1–35)
ARTERIAL PATENCY WRIST A: POSITIVE
AST SERPL-CCNC: 11 U/L (ref 1–40)
ATMOSPHERIC PRESS: 752 MMHG
BACTERIA UR QL AUTO: ABNORMAL /HPF
BASE EXCESS BLDA CALC-SCNC: 0.8 MMOL/L (ref 0–2)
BASOPHILS # BLD AUTO: 0.05 10*3/MM3 (ref 0–0.2)
BASOPHILS NFR BLD AUTO: 0.5 % (ref 0–1.5)
BDY SITE: ABNORMAL
BILIRUB SERPL-MCNC: 0.4 MG/DL (ref 0–1.2)
BILIRUB UR QL STRIP: NEGATIVE
BODY TEMPERATURE: 37 C
BUN SERPL-MCNC: 27 MG/DL (ref 8–23)
BUN/CREAT SERPL: 12.8 (ref 7–25)
CALCIUM SPEC-SCNC: 9.3 MG/DL (ref 8.6–10.5)
CHLORIDE SERPL-SCNC: 100 MMOL/L (ref 98–107)
CLARITY UR: CLEAR
CO2 SERPL-SCNC: 23 MMOL/L (ref 22–29)
COHGB MFR BLD: 1.9 % (ref 0–5)
COLOR UR: YELLOW
CREAT SERPL-MCNC: 2.11 MG/DL (ref 0.76–1.27)
D DIMER PPP FEU-MCNC: 0.83 MCGFEU/ML (ref 0–0.67)
DEPRECATED RDW RBC AUTO: 47.8 FL (ref 37–54)
EGFRCR SERPLBLD CKD-EPI 2021: 33.7 ML/MIN/1.73
EOSINOPHIL # BLD AUTO: 0.18 10*3/MM3 (ref 0–0.4)
EOSINOPHIL NFR BLD AUTO: 1.8 % (ref 0.3–6.2)
ERYTHROCYTE [DISTWIDTH] IN BLOOD BY AUTOMATED COUNT: 15.3 % (ref 12.3–15.4)
FLUAV RNA RESP QL NAA+PROBE: NOT DETECTED
FLUBV RNA RESP QL NAA+PROBE: NOT DETECTED
GEN 5 2HR TROPONIN T REFLEX: 42 NG/L
GLOBULIN UR ELPH-MCNC: 3 GM/DL
GLUCOSE BLDC GLUCOMTR-MCNC: 301 MG/DL (ref 70–130)
GLUCOSE BLDC GLUCOMTR-MCNC: 365 MG/DL (ref 70–130)
GLUCOSE BLDC GLUCOMTR-MCNC: 408 MG/DL (ref 70–130)
GLUCOSE SERPL-MCNC: 520 MG/DL (ref 65–99)
GLUCOSE UR STRIP-MCNC: ABNORMAL MG/DL
HCO3 BLDA-SCNC: 25.4 MMOL/L (ref 20–26)
HCT VFR BLD AUTO: 32.9 % (ref 37.5–51)
HCT VFR BLD CALC: 32.9 % (ref 38–51)
HGB BLD-MCNC: 10.4 G/DL (ref 13–17.7)
HGB BLDA-MCNC: 10.7 G/DL (ref 14–18)
HGB UR QL STRIP.AUTO: ABNORMAL
HYALINE CASTS UR QL AUTO: ABNORMAL /LPF
IMM GRANULOCYTES # BLD AUTO: 0.05 10*3/MM3 (ref 0–0.05)
IMM GRANULOCYTES NFR BLD AUTO: 0.5 % (ref 0–0.5)
INR PPP: 0.97 (ref 0.91–1.09)
KETONES UR QL STRIP: NEGATIVE
LEUKOCYTE ESTERASE UR QL STRIP.AUTO: NEGATIVE
LYMPHOCYTES # BLD AUTO: 1.04 10*3/MM3 (ref 0.7–3.1)
LYMPHOCYTES NFR BLD AUTO: 10.4 % (ref 19.6–45.3)
Lab: ABNORMAL
MCH RBC QN AUTO: 27.4 PG (ref 26.6–33)
MCHC RBC AUTO-ENTMCNC: 31.6 G/DL (ref 31.5–35.7)
MCV RBC AUTO: 86.6 FL (ref 79–97)
METHGB BLD QL: 0.5 % (ref 0–3)
MODALITY: ABNORMAL
MONOCYTES # BLD AUTO: 0.78 10*3/MM3 (ref 0.1–0.9)
MONOCYTES NFR BLD AUTO: 7.8 % (ref 5–12)
NEUTROPHILS NFR BLD AUTO: 7.89 10*3/MM3 (ref 1.7–7)
NEUTROPHILS NFR BLD AUTO: 79 % (ref 42.7–76)
NITRITE UR QL STRIP: NEGATIVE
NRBC BLD AUTO-RTO: 0 /100 WBC (ref 0–0.2)
NT-PROBNP SERPL-MCNC: 2878 PG/ML (ref 0–900)
OXYHGB MFR BLDV: 92.5 % (ref 94–99)
PCO2 BLDA: 39.4 MM HG (ref 35–45)
PCO2 TEMP ADJ BLD: 39.4 MM HG (ref 35–45)
PH BLDA: 7.42 PH UNITS (ref 7.35–7.45)
PH UR STRIP.AUTO: 5.5 [PH] (ref 5–8)
PH, TEMP CORRECTED: 7.42 PH UNITS (ref 7.35–7.45)
PLATELET # BLD AUTO: 254 10*3/MM3 (ref 140–450)
PMV BLD AUTO: 11.8 FL (ref 6–12)
PO2 BLDA: 67.5 MM HG (ref 83–108)
PO2 TEMP ADJ BLD: 67.5 MM HG (ref 83–108)
POTASSIUM BLDA-SCNC: 4.1 MMOL/L (ref 3.5–5.2)
POTASSIUM SERPL-SCNC: 4.4 MMOL/L (ref 3.5–5.2)
PROT SERPL-MCNC: 7.2 G/DL (ref 6–8.5)
PROT UR QL STRIP: ABNORMAL
PROTHROMBIN TIME: 13 SECONDS (ref 11.8–14.8)
RBC # BLD AUTO: 3.8 10*6/MM3 (ref 4.14–5.8)
RBC # UR STRIP: ABNORMAL /HPF
REF LAB TEST METHOD: ABNORMAL
SAO2 % BLDCOA: 94.8 % (ref 94–99)
SARS-COV-2 RNA RESP QL NAA+PROBE: NOT DETECTED
SODIUM BLDA-SCNC: 138 MMOL/L (ref 136–145)
SODIUM SERPL-SCNC: 136 MMOL/L (ref 136–145)
SP GR UR STRIP: >=1.03 (ref 1–1.03)
SQUAMOUS #/AREA URNS HPF: ABNORMAL /HPF
TROPONIN T DELTA: -3 NG/L
TROPONIN T SERPL HS-MCNC: 45 NG/L
UROBILINOGEN UR QL STRIP: ABNORMAL
VENTILATOR MODE: ABNORMAL
WBC # UR STRIP: ABNORMAL /HPF
WBC NRBC COR # BLD: 9.99 10*3/MM3 (ref 3.4–10.8)

## 2023-08-21 PROCEDURE — G0378 HOSPITAL OBSERVATION PER HR: HCPCS

## 2023-08-21 PROCEDURE — 25010000002 ONDANSETRON PER 1 MG: Performed by: NURSE PRACTITIONER

## 2023-08-21 PROCEDURE — 96376 TX/PRO/DX INJ SAME DRUG ADON: CPT

## 2023-08-21 PROCEDURE — 93010 ELECTROCARDIOGRAM REPORT: CPT | Performed by: INTERNAL MEDICINE

## 2023-08-21 PROCEDURE — 82375 ASSAY CARBOXYHB QUANT: CPT

## 2023-08-21 PROCEDURE — 81001 URINALYSIS AUTO W/SCOPE: CPT | Performed by: NURSE PRACTITIONER

## 2023-08-21 PROCEDURE — 36415 COLL VENOUS BLD VENIPUNCTURE: CPT

## 2023-08-21 PROCEDURE — 96374 THER/PROPH/DIAG INJ IV PUSH: CPT

## 2023-08-21 PROCEDURE — 85379 FIBRIN DEGRADATION QUANT: CPT | Performed by: NURSE PRACTITIONER

## 2023-08-21 PROCEDURE — 80053 COMPREHEN METABOLIC PANEL: CPT | Performed by: NURSE PRACTITIONER

## 2023-08-21 PROCEDURE — 85025 COMPLETE CBC W/AUTO DIFF WBC: CPT | Performed by: NURSE PRACTITIONER

## 2023-08-21 PROCEDURE — 93005 ELECTROCARDIOGRAM TRACING: CPT | Performed by: EMERGENCY MEDICINE

## 2023-08-21 PROCEDURE — 63710000001 INSULIN REGULAR HUMAN PER 5 UNITS: Performed by: NURSE PRACTITIONER

## 2023-08-21 PROCEDURE — 71045 X-RAY EXAM CHEST 1 VIEW: CPT

## 2023-08-21 PROCEDURE — 84484 ASSAY OF TROPONIN QUANT: CPT | Performed by: NURSE PRACTITIONER

## 2023-08-21 PROCEDURE — 82009 KETONE BODYS QUAL: CPT | Performed by: NURSE PRACTITIONER

## 2023-08-21 PROCEDURE — 87636 SARSCOV2 & INF A&B AMP PRB: CPT | Performed by: NURSE PRACTITIONER

## 2023-08-21 PROCEDURE — 25010000002 PROCHLORPERAZINE 10 MG/2ML SOLUTION: Performed by: NURSE PRACTITIONER

## 2023-08-21 PROCEDURE — 99285 EMERGENCY DEPT VISIT HI MDM: CPT

## 2023-08-21 PROCEDURE — 93005 ELECTROCARDIOGRAM TRACING: CPT | Performed by: FAMILY MEDICINE

## 2023-08-21 PROCEDURE — 83880 ASSAY OF NATRIURETIC PEPTIDE: CPT | Performed by: NURSE PRACTITIONER

## 2023-08-21 PROCEDURE — 70450 CT HEAD/BRAIN W/O DYE: CPT

## 2023-08-21 PROCEDURE — 85610 PROTHROMBIN TIME: CPT | Performed by: NURSE PRACTITIONER

## 2023-08-21 PROCEDURE — 71275 CT ANGIOGRAPHY CHEST: CPT

## 2023-08-21 PROCEDURE — 85730 THROMBOPLASTIN TIME PARTIAL: CPT | Performed by: NURSE PRACTITIONER

## 2023-08-21 PROCEDURE — 96375 TX/PRO/DX INJ NEW DRUG ADDON: CPT

## 2023-08-21 PROCEDURE — 82805 BLOOD GASES W/O2 SATURATION: CPT

## 2023-08-21 PROCEDURE — 36600 WITHDRAWAL OF ARTERIAL BLOOD: CPT

## 2023-08-21 PROCEDURE — 25510000001 IOPAMIDOL PER 1 ML: Performed by: NURSE PRACTITIONER

## 2023-08-21 PROCEDURE — 83050 HGB METHEMOGLOBIN QUAN: CPT

## 2023-08-21 PROCEDURE — 82948 REAGENT STRIP/BLOOD GLUCOSE: CPT

## 2023-08-21 RX ORDER — AMOXICILLIN 250 MG
1 CAPSULE ORAL NIGHTLY PRN
Status: DISCONTINUED | OUTPATIENT
Start: 2023-08-21 | End: 2023-08-21

## 2023-08-21 RX ORDER — TAMSULOSIN HYDROCHLORIDE 0.4 MG/1
0.4 CAPSULE ORAL DAILY
Status: DISCONTINUED | OUTPATIENT
Start: 2023-08-22 | End: 2023-08-23 | Stop reason: HOSPADM

## 2023-08-21 RX ORDER — BISACODYL 10 MG
10 SUPPOSITORY, RECTAL RECTAL DAILY PRN
Status: DISCONTINUED | OUTPATIENT
Start: 2023-08-21 | End: 2023-08-21

## 2023-08-21 RX ORDER — BISACODYL 10 MG
10 SUPPOSITORY, RECTAL RECTAL DAILY PRN
Status: DISCONTINUED | OUTPATIENT
Start: 2023-08-21 | End: 2023-08-23

## 2023-08-21 RX ORDER — DONEPEZIL HYDROCHLORIDE 10 MG/1
10 TABLET, FILM COATED ORAL DAILY
Status: DISCONTINUED | OUTPATIENT
Start: 2023-08-22 | End: 2023-08-23 | Stop reason: HOSPADM

## 2023-08-21 RX ORDER — SODIUM CHLORIDE 0.9 % (FLUSH) 0.9 %
10 SYRINGE (ML) INJECTION AS NEEDED
Status: DISCONTINUED | OUTPATIENT
Start: 2023-08-21 | End: 2023-08-23 | Stop reason: HOSPADM

## 2023-08-21 RX ORDER — CLONIDINE HYDROCHLORIDE 0.1 MG/1
0.1 TABLET ORAL EVERY 12 HOURS SCHEDULED
Status: DISCONTINUED | OUTPATIENT
Start: 2023-08-22 | End: 2023-08-23 | Stop reason: HOSPADM

## 2023-08-21 RX ORDER — CALCITRIOL 0.25 UG/1
0.5 CAPSULE, LIQUID FILLED ORAL DAILY
Status: DISCONTINUED | OUTPATIENT
Start: 2023-08-22 | End: 2023-08-23 | Stop reason: HOSPADM

## 2023-08-21 RX ORDER — ACETAMINOPHEN 650 MG/1
650 SUPPOSITORY RECTAL EVERY 4 HOURS PRN
Status: DISCONTINUED | OUTPATIENT
Start: 2023-08-21 | End: 2023-08-23 | Stop reason: HOSPADM

## 2023-08-21 RX ORDER — BUSPIRONE HYDROCHLORIDE 10 MG/1
10 TABLET ORAL 2 TIMES DAILY PRN
Status: DISCONTINUED | OUTPATIENT
Start: 2023-08-21 | End: 2023-08-23 | Stop reason: HOSPADM

## 2023-08-21 RX ORDER — NICOTINE POLACRILEX 4 MG
15 LOZENGE BUCCAL
Status: DISCONTINUED | OUTPATIENT
Start: 2023-08-21 | End: 2023-08-23 | Stop reason: HOSPADM

## 2023-08-21 RX ORDER — DEXTROSE MONOHYDRATE 25 G/50ML
25 INJECTION, SOLUTION INTRAVENOUS
Status: DISCONTINUED | OUTPATIENT
Start: 2023-08-21 | End: 2023-08-23 | Stop reason: HOSPADM

## 2023-08-21 RX ORDER — AMITRIPTYLINE HYDROCHLORIDE 25 MG/1
50 TABLET, FILM COATED ORAL NIGHTLY
Status: DISCONTINUED | OUTPATIENT
Start: 2023-08-22 | End: 2023-08-23 | Stop reason: HOSPADM

## 2023-08-21 RX ORDER — SODIUM CHLORIDE 0.9 % (FLUSH) 0.9 %
10 SYRINGE (ML) INJECTION EVERY 12 HOURS SCHEDULED
Status: DISCONTINUED | OUTPATIENT
Start: 2023-08-21 | End: 2023-08-23 | Stop reason: HOSPADM

## 2023-08-21 RX ORDER — ASPIRIN 81 MG/1
81 TABLET ORAL DAILY
Status: DISCONTINUED | OUTPATIENT
Start: 2023-08-22 | End: 2023-08-23 | Stop reason: HOSPADM

## 2023-08-21 RX ORDER — PREGABALIN 100 MG/1
200 CAPSULE ORAL NIGHTLY
Status: DISCONTINUED | OUTPATIENT
Start: 2023-08-22 | End: 2023-08-23 | Stop reason: HOSPADM

## 2023-08-21 RX ORDER — BISACODYL 5 MG/1
5 TABLET, DELAYED RELEASE ORAL DAILY PRN
Status: DISCONTINUED | OUTPATIENT
Start: 2023-08-21 | End: 2023-08-23

## 2023-08-21 RX ORDER — POLYETHYLENE GLYCOL 3350 17 G/17G
17 POWDER, FOR SOLUTION ORAL DAILY PRN
Status: DISCONTINUED | OUTPATIENT
Start: 2023-08-21 | End: 2023-08-23

## 2023-08-21 RX ORDER — PANTOPRAZOLE SODIUM 40 MG/1
40 TABLET, DELAYED RELEASE ORAL 2 TIMES DAILY
Status: DISCONTINUED | OUTPATIENT
Start: 2023-08-22 | End: 2023-08-23 | Stop reason: HOSPADM

## 2023-08-21 RX ORDER — INSULIN LISPRO 100 [IU]/ML
3-14 INJECTION, SOLUTION INTRAVENOUS; SUBCUTANEOUS
Status: DISCONTINUED | OUTPATIENT
Start: 2023-08-22 | End: 2023-08-23 | Stop reason: HOSPADM

## 2023-08-21 RX ORDER — AMOXICILLIN 250 MG
1 CAPSULE ORAL NIGHTLY
Status: DISCONTINUED | OUTPATIENT
Start: 2023-08-22 | End: 2023-08-23

## 2023-08-21 RX ORDER — CARVEDILOL 6.25 MG/1
6.25 TABLET ORAL 2 TIMES DAILY
Status: DISCONTINUED | OUTPATIENT
Start: 2023-08-22 | End: 2023-08-23 | Stop reason: HOSPADM

## 2023-08-21 RX ORDER — ONDANSETRON 2 MG/ML
4 INJECTION INTRAMUSCULAR; INTRAVENOUS EVERY 6 HOURS
Status: DISCONTINUED | OUTPATIENT
Start: 2023-08-21 | End: 2023-08-23

## 2023-08-21 RX ORDER — HYDROCODONE BITARTRATE AND ACETAMINOPHEN 7.5; 325 MG/1; MG/1
1 TABLET ORAL 2 TIMES DAILY PRN
Status: DISCONTINUED | OUTPATIENT
Start: 2023-08-21 | End: 2023-08-23 | Stop reason: HOSPADM

## 2023-08-21 RX ORDER — POLYETHYLENE GLYCOL 3350 17 G/17G
17 POWDER, FOR SOLUTION ORAL DAILY PRN
Status: DISCONTINUED | OUTPATIENT
Start: 2023-08-21 | End: 2023-08-21

## 2023-08-21 RX ORDER — ACETAMINOPHEN 325 MG/1
650 TABLET ORAL EVERY 4 HOURS PRN
Status: DISCONTINUED | OUTPATIENT
Start: 2023-08-21 | End: 2023-08-23 | Stop reason: HOSPADM

## 2023-08-21 RX ORDER — ROSUVASTATIN CALCIUM 20 MG/1
40 TABLET, COATED ORAL NIGHTLY
Status: DISCONTINUED | OUTPATIENT
Start: 2023-08-22 | End: 2023-08-23 | Stop reason: HOSPADM

## 2023-08-21 RX ORDER — PROCHLORPERAZINE EDISYLATE 5 MG/ML
5 INJECTION INTRAMUSCULAR; INTRAVENOUS ONCE
Status: COMPLETED | OUTPATIENT
Start: 2023-08-21 | End: 2023-08-21

## 2023-08-21 RX ORDER — INSULIN LISPRO 100 [IU]/ML
25 INJECTION, SOLUTION INTRAVENOUS; SUBCUTANEOUS
Status: DISCONTINUED | OUTPATIENT
Start: 2023-08-22 | End: 2023-08-23 | Stop reason: HOSPADM

## 2023-08-21 RX ORDER — SODIUM CHLORIDE 9 MG/ML
40 INJECTION, SOLUTION INTRAVENOUS AS NEEDED
Status: DISCONTINUED | OUTPATIENT
Start: 2023-08-21 | End: 2023-08-23 | Stop reason: HOSPADM

## 2023-08-21 RX ORDER — ONDANSETRON 2 MG/ML
4 INJECTION INTRAMUSCULAR; INTRAVENOUS ONCE
Status: COMPLETED | OUTPATIENT
Start: 2023-08-21 | End: 2023-08-21

## 2023-08-21 RX ORDER — PREGABALIN 100 MG/1
100 CAPSULE ORAL EVERY MORNING
Status: DISCONTINUED | OUTPATIENT
Start: 2023-08-22 | End: 2023-08-23 | Stop reason: HOSPADM

## 2023-08-21 RX ORDER — BISACODYL 5 MG/1
5 TABLET, DELAYED RELEASE ORAL DAILY PRN
Status: DISCONTINUED | OUTPATIENT
Start: 2023-08-21 | End: 2023-08-21

## 2023-08-21 RX ADMIN — INSULIN HUMAN 8 UNITS: 100 INJECTION, SOLUTION PARENTERAL at 21:35

## 2023-08-21 RX ADMIN — IOPAMIDOL 100 ML: 755 INJECTION, SOLUTION INTRAVENOUS at 21:03

## 2023-08-21 RX ADMIN — PROCHLORPERAZINE EDISYLATE 5 MG: 5 INJECTION INTRAMUSCULAR; INTRAVENOUS at 21:55

## 2023-08-21 RX ADMIN — Medication 10 ML: at 23:33

## 2023-08-21 RX ADMIN — INSULIN HUMAN 8 UNITS: 100 INJECTION, SOLUTION PARENTERAL at 19:18

## 2023-08-21 RX ADMIN — ONDANSETRON 4 MG: 2 INJECTION INTRAMUSCULAR; INTRAVENOUS at 19:12

## 2023-08-21 RX ADMIN — SODIUM CHLORIDE 500 ML: 9 INJECTION, SOLUTION INTRAVENOUS at 20:50

## 2023-08-21 RX ADMIN — ONDANSETRON 4 MG: 2 INJECTION INTRAMUSCULAR; INTRAVENOUS at 23:33

## 2023-08-22 ENCOUNTER — APPOINTMENT (OUTPATIENT)
Dept: CARDIOLOGY | Facility: HOSPITAL | Age: 68
End: 2023-08-22
Payer: COMMERCIAL

## 2023-08-22 PROBLEM — R55 SYNCOPE: Status: ACTIVE | Noted: 2023-08-22

## 2023-08-22 LAB
ANION GAP SERPL CALCULATED.3IONS-SCNC: 8 MMOL/L (ref 5–15)
BH CV ECHO MEAS - AO MAX PG: 9 MMHG
BH CV ECHO MEAS - AO MEAN PG: 6 MMHG
BH CV ECHO MEAS - AO ROOT DIAM: 3.4 CM
BH CV ECHO MEAS - AO V2 MAX: 150 CM/SEC
BH CV ECHO MEAS - AO V2 VTI: 37.8 CM
BH CV ECHO MEAS - AVA(I,D): 3.1 CM2
BH CV ECHO MEAS - EDV(MOD-SP4): 129 ML
BH CV ECHO MEAS - EF(MOD-SP4): 52.7 %
BH CV ECHO MEAS - ESV(MOD-SP4): 61 ML
BH CV ECHO MEAS - LA DIMENSION: 4.8 CM
BH CV ECHO MEAS - LV MAX PG: 3.4 MMHG
BH CV ECHO MEAS - LV MEAN PG: 2 MMHG
BH CV ECHO MEAS - LV V1 MAX: 91.9 CM/SEC
BH CV ECHO MEAS - LV V1 VTI: 25.5 CM
BH CV ECHO MEAS - LVOT AREA: 4.5 CM2
BH CV ECHO MEAS - LVOT DIAM: 2.4 CM
BH CV ECHO MEAS - MV A MAX VEL: 66.3 CM/SEC
BH CV ECHO MEAS - MV DEC TIME: 0.2 MSEC
BH CV ECHO MEAS - MV E MAX VEL: 127 CM/SEC
BH CV ECHO MEAS - MV E/A: 1.92
BH CV ECHO MEAS - PA V2 MAX: 85.5 CM/SEC
BH CV ECHO MEAS - SV(LVOT): 115.4 ML
BH CV ECHO MEAS - SV(MOD-SP4): 68 ML
BH CV ECHO SHUNT ASSESSMENT PERFORMED (HIDDEN SCRIPTING): 1
BUN SERPL-MCNC: 23 MG/DL (ref 8–23)
BUN/CREAT SERPL: 12.1 (ref 7–25)
CALCIUM SPEC-SCNC: 8.9 MG/DL (ref 8.6–10.5)
CHLORIDE SERPL-SCNC: 105 MMOL/L (ref 98–107)
CHOLEST SERPL-MCNC: 146 MG/DL (ref 0–200)
CO2 SERPL-SCNC: 28 MMOL/L (ref 22–29)
CREAT SERPL-MCNC: 1.9 MG/DL (ref 0.76–1.27)
DEPRECATED RDW RBC AUTO: 47.7 FL (ref 37–54)
EGFRCR SERPLBLD CKD-EPI 2021: 38.2 ML/MIN/1.73
ERYTHROCYTE [DISTWIDTH] IN BLOOD BY AUTOMATED COUNT: 15.1 % (ref 12.3–15.4)
GLUCOSE BLDC GLUCOMTR-MCNC: 124 MG/DL (ref 70–130)
GLUCOSE BLDC GLUCOMTR-MCNC: 134 MG/DL (ref 70–130)
GLUCOSE BLDC GLUCOMTR-MCNC: 136 MG/DL (ref 70–130)
GLUCOSE BLDC GLUCOMTR-MCNC: 188 MG/DL (ref 70–130)
GLUCOSE BLDC GLUCOMTR-MCNC: 246 MG/DL (ref 70–130)
GLUCOSE BLDC GLUCOMTR-MCNC: 79 MG/DL (ref 70–130)
GLUCOSE BLDC GLUCOMTR-MCNC: 96 MG/DL (ref 70–130)
GLUCOSE SERPL-MCNC: 174 MG/DL (ref 65–99)
HCT VFR BLD AUTO: 32 % (ref 37.5–51)
HDLC SERPL-MCNC: 44 MG/DL (ref 40–60)
HGB BLD-MCNC: 9.9 G/DL (ref 13–17.7)
LDLC SERPL CALC-MCNC: 79 MG/DL (ref 0–100)
LDLC/HDLC SERPL: 1.72 {RATIO}
LEFT ATRIUM VOLUME INDEX: 27.3 ML/M2
MCH RBC QN AUTO: 26.8 PG (ref 26.6–33)
MCHC RBC AUTO-ENTMCNC: 30.9 G/DL (ref 31.5–35.7)
MCV RBC AUTO: 86.7 FL (ref 79–97)
PLATELET # BLD AUTO: 221 10*3/MM3 (ref 140–450)
PMV BLD AUTO: 10.8 FL (ref 6–12)
POTASSIUM SERPL-SCNC: 3.7 MMOL/L (ref 3.5–5.2)
RBC # BLD AUTO: 3.69 10*6/MM3 (ref 4.14–5.8)
SODIUM SERPL-SCNC: 141 MMOL/L (ref 136–145)
TRIGL SERPL-MCNC: 132 MG/DL (ref 0–150)
VLDLC SERPL-MCNC: 23 MG/DL (ref 5–40)
WBC NRBC COR # BLD: 7.92 10*3/MM3 (ref 3.4–10.8)

## 2023-08-22 PROCEDURE — 36415 COLL VENOUS BLD VENIPUNCTURE: CPT | Performed by: NURSE PRACTITIONER

## 2023-08-22 PROCEDURE — 87205 SMEAR GRAM STAIN: CPT | Performed by: NURSE PRACTITIONER

## 2023-08-22 PROCEDURE — 82948 REAGENT STRIP/BLOOD GLUCOSE: CPT

## 2023-08-22 PROCEDURE — 25010000002 ONDANSETRON PER 1 MG: Performed by: NURSE PRACTITIONER

## 2023-08-22 PROCEDURE — 87070 CULTURE OTHR SPECIMN AEROBIC: CPT | Performed by: NURSE PRACTITIONER

## 2023-08-22 PROCEDURE — G0378 HOSPITAL OBSERVATION PER HR: HCPCS

## 2023-08-22 PROCEDURE — 25510000001 PERFLUTREN 6.52 MG/ML SUSPENSION: Performed by: FAMILY MEDICINE

## 2023-08-22 PROCEDURE — 93306 TTE W/DOPPLER COMPLETE: CPT | Performed by: INTERNAL MEDICINE

## 2023-08-22 PROCEDURE — 80048 BASIC METABOLIC PNL TOTAL CA: CPT | Performed by: NURSE PRACTITIONER

## 2023-08-22 PROCEDURE — 80061 LIPID PANEL: CPT | Performed by: NURSE PRACTITIONER

## 2023-08-22 PROCEDURE — 87176 TISSUE HOMOGENIZATION CULTR: CPT | Performed by: NURSE PRACTITIONER

## 2023-08-22 PROCEDURE — 96376 TX/PRO/DX INJ SAME DRUG ADON: CPT

## 2023-08-22 PROCEDURE — 93306 TTE W/DOPPLER COMPLETE: CPT

## 2023-08-22 PROCEDURE — 63710000001 INSULIN DETEMIR PER 5 UNITS: Performed by: NURSE PRACTITIONER

## 2023-08-22 PROCEDURE — 87147 CULTURE TYPE IMMUNOLOGIC: CPT | Performed by: NURSE PRACTITIONER

## 2023-08-22 PROCEDURE — 85027 COMPLETE CBC AUTOMATED: CPT | Performed by: NURSE PRACTITIONER

## 2023-08-22 RX ADMIN — PANTOPRAZOLE SODIUM 40 MG: 40 TABLET, DELAYED RELEASE ORAL at 01:43

## 2023-08-22 RX ADMIN — PREGABALIN 200 MG: 100 CAPSULE ORAL at 21:30

## 2023-08-22 RX ADMIN — CARVEDILOL 6.25 MG: 6.25 TABLET, FILM COATED ORAL at 01:43

## 2023-08-22 RX ADMIN — PREGABALIN 200 MG: 100 CAPSULE ORAL at 01:43

## 2023-08-22 RX ADMIN — PERFLUTREN 6.52 MG: 6.52 INJECTION, SUSPENSION INTRAVENOUS at 15:43

## 2023-08-22 RX ADMIN — CALCITRIOL 0.5 MCG: 0.25 CAPSULE ORAL at 10:05

## 2023-08-22 RX ADMIN — AMITRIPTYLINE HYDROCHLORIDE 50 MG: 25 TABLET, FILM COATED ORAL at 01:43

## 2023-08-22 RX ADMIN — ONDANSETRON 4 MG: 2 INJECTION INTRAMUSCULAR; INTRAVENOUS at 17:01

## 2023-08-22 RX ADMIN — PANTOPRAZOLE SODIUM 40 MG: 40 TABLET, DELAYED RELEASE ORAL at 21:20

## 2023-08-22 RX ADMIN — ONDANSETRON 4 MG: 2 INJECTION INTRAMUSCULAR; INTRAVENOUS at 06:21

## 2023-08-22 RX ADMIN — CARVEDILOL 6.25 MG: 6.25 TABLET, FILM COATED ORAL at 21:20

## 2023-08-22 RX ADMIN — Medication 10 ML: at 21:21

## 2023-08-22 RX ADMIN — EMPAGLIFLOZIN 25 MG: 25 TABLET, FILM COATED ORAL at 10:05

## 2023-08-22 RX ADMIN — DOCUSATE SODIUM 50 MG AND SENNOSIDES 8.6 MG 1 TABLET: 8.6; 5 TABLET, FILM COATED ORAL at 21:30

## 2023-08-22 RX ADMIN — ROSUVASTATIN CALCIUM 40 MG: 20 TABLET, FILM COATED ORAL at 21:20

## 2023-08-22 RX ADMIN — DOCUSATE SODIUM 50 MG AND SENNOSIDES 8.6 MG 1 TABLET: 8.6; 5 TABLET, FILM COATED ORAL at 01:43

## 2023-08-22 RX ADMIN — CLONIDINE HYDROCHLORIDE 0.1 MG: 0.1 TABLET ORAL at 21:20

## 2023-08-22 RX ADMIN — ONDANSETRON 4 MG: 2 INJECTION INTRAMUSCULAR; INTRAVENOUS at 11:28

## 2023-08-22 RX ADMIN — ASPIRIN 81 MG: 81 TABLET, COATED ORAL at 10:05

## 2023-08-22 RX ADMIN — ROSUVASTATIN CALCIUM 40 MG: 20 TABLET, FILM COATED ORAL at 01:43

## 2023-08-22 RX ADMIN — AMITRIPTYLINE HYDROCHLORIDE 50 MG: 25 TABLET, FILM COATED ORAL at 21:20

## 2023-08-22 RX ADMIN — CARVEDILOL 6.25 MG: 6.25 TABLET, FILM COATED ORAL at 10:05

## 2023-08-22 RX ADMIN — TAMSULOSIN HYDROCHLORIDE 0.4 MG: 0.4 CAPSULE ORAL at 10:05

## 2023-08-22 RX ADMIN — CLONIDINE HYDROCHLORIDE 0.1 MG: 0.1 TABLET ORAL at 01:43

## 2023-08-22 RX ADMIN — DONEPEZIL HYDROCHLORIDE 10 MG: 10 TABLET, FILM COATED ORAL at 10:05

## 2023-08-22 RX ADMIN — CLONIDINE HYDROCHLORIDE 0.1 MG: 0.1 TABLET ORAL at 10:05

## 2023-08-22 RX ADMIN — HYDROCODONE BITARTRATE AND ACETAMINOPHEN 1 TABLET: 7.5; 325 TABLET ORAL at 21:19

## 2023-08-22 RX ADMIN — PANTOPRAZOLE SODIUM 40 MG: 40 TABLET, DELAYED RELEASE ORAL at 10:05

## 2023-08-22 RX ADMIN — INSULIN DETEMIR 50 UNITS: 100 INJECTION, SOLUTION SUBCUTANEOUS at 01:46

## 2023-08-22 NOTE — PROGRESS NOTES
[facility] Medicine Services  INPATIENT PROGRESS NOTE    Patient Name: Erick Luong  Date of Admission: 8/21/2023  Today's Date: 08/22/23  Length of Stay: 1  Primary Care Physician: Del Shetty MD    Subjective   Chief Complaint: nausea  HPI   Mr. Luong is a 68-year-old male who presented to [facility] on 8/21 with syncope and collapse.  He was at [facility] on 8/21 when he had an episode of profuse sweating and felt as if he needed to have a bowel movement.  He states that he woke up on the floor after having a bowel movement.  He states he got up and walked to his car and passed out again and awakened and vomited. Wife states he has been nauseated for the past 2-3 weeks, awaiting evaluation by GI. He had an upper GI endoscopy in February 2021 by Dr. Hannah -revealed esophagitis. He takes protonix on outpatient basis.  Patient has a 2-week history of high glucose.  He has had insulin adjustment per Dr. Shetty. He is medically noncompliant and has had several hospitalizations for volume overload.  In the ED, glucose 500.  CT head showed no acute findings.  CT angiogram chest showed no PE.  Stable chest appearance compared with CT 7 months ago. COVID/Flu negative.    Sitting up in bed.  Glucose 79 this morning.  He was complaining of nausea but did not request for an antiemetic.  Now states that his nausea has improved and he was able to eat 100% of his lunch at bedside.  No shortness of breath, chest pain or pressure.  No diarrhea.  States he has not had a bowel movement in 2-3 days.  Lower extremity edema baseline per patient.    Review of Systems   All pertinent negatives and positives are as above. All other systems have been reviewed and are negative unless otherwise stated.     Objective    Temp:  [97.7 øF (36.5 øC)-98.2 øF (36.8 øC)] 98.1 øF (36.7 øC)  Heart Rate:  [53-88] 60  Resp:  [18-21] 18  BP: (123-181)/() 123/57  Physical Exam  Vitals reviewed.    Constitutional:       General: He is not in acute distress.     Appearance: He is obese. He is not toxic-appearing.      Comments: Lying in bed.  No acute distress.  On room air.  No family at bedside.  Discussed with his nurse marcia   HENT:      Head: Normocephalic and atraumatic.      Mouth/Throat:      Mouth: Mucous membranes are moist.      Pharynx: Oropharynx is clear.   Eyes:      Extraocular Movements: Extraocular movements intact.      Conjunctiva/sclera: Conjunctivae normal.      Pupils: Pupils are equal, round, and reactive to light.   Cardiovascular:      Rate and Rhythm: Normal rate and regular rhythm.      Pulses: Normal pulses.   Pulmonary:      Effort: Pulmonary effort is normal.      Breath sounds: Normal breath sounds.   Abdominal:      General: Bowel sounds are normal. There is no distension.      Palpations: Abdomen is soft.      Tenderness: There is no abdominal tenderness.   Musculoskeletal:         General: No swelling or tenderness. Normal range of motion.      Cervical back: Normal range of motion and neck supple. No muscular tenderness.      Right lower leg: Edema present.      Left lower leg: Edema present.   Skin:     General: Skin is warm and dry.      Findings: No erythema or rash.   Neurological:      General: No focal deficit present.      Mental Status: He is alert and oriented to person, place, and time.      Cranial Nerves: No cranial nerve deficit.      Motor: No weakness.   Psychiatric:         Mood and Affect: Mood normal.         Behavior: Behavior normal.     Results Review:  I have reviewed the labs, radiology results, and diagnostic studies.    Laboratory Data:   Results from last 7 days   Lab Units 08/22/23  0458 08/21/23  1643 08/16/23  1257   WBC 10*3/mm3 7.92 9.99 8.50   HEMOGLOBIN g/dL 9.9* 10.4* 11.1*   HEMATOCRIT % 32.0* 32.9* 34.7*   PLATELETS 10*3/mm3 221 254 266        Results from last 7 days   Lab Units 08/22/23  0458 08/21/23  1921 08/21/23  0214  08/16/23  1257   SODIUM mmol/L 141  --  136 139   SODIUM, ARTERIAL mmol/L  --  138  --   --    POTASSIUM mmol/L 3.7  --  4.4 4.4   CHLORIDE mmol/L 105  --  100 103   CO2 mmol/L 28.0  --  23.0 29.0   BUN mg/dL 23  --  27* 27*   CREATININE mg/dL 1.90*  --  2.11* 1.67*   CALCIUM mg/dL 8.9  --  9.3 8.7   BILIRUBIN mg/dL  --   --  0.4 0.6   ALK PHOS U/L  --   --  85 84   ALT (SGPT) U/L  --   --  11 20   AST (SGOT) U/L  --   --  11 25   GLUCOSE mg/dL 174*  --  520* 210*       Culture Data:   No results found for: ACANTHNAEG, AFBCX, BPERTUSSISCX, BLOODCX  No results found for: BCIDPCR, CXREFLEX, CSFCX, CULTURETIS  No results found for: CULTURES, HSVCX, URCX  No results found for: EYECULTURE, GCCX, HSVCULTURE, LABHSV  No results found for: LEGIONELLA, MRSACX, MUMPSCX, MYCOPLASCX  No results found for: NOCARDIACX, STOOLCX  No results found for: THROATCX, UNSTIMCULT, URINECX, CULTURE, VZVCULTUR  No results found for: VIRALCULTU, WOUNDCX    Radiology Data:   Imaging Results (Last 24 Hours)       Procedure Component Value Units Date/Time    CT Angiogram Chest [[id number]] Collected: 08/21/23 2117     Updated: 08/21/23 2124    Narrative:      EXAMINATION: CT ANGIOGRAM CHEST-      8/21/2023 8:47 PM CDT     HISTORY: Syncope. Loss of consciousness. Chest pain.     In order to have a CT radiation dose as low as reasonably achievable  Automated Exposure Control was utilized for adjustment of the mA and/or  KV according to patient size.     DLP in mGycm= 263.     CT angiogram chest with IV contrast.  CT angiography protocol.   CT imaging with bolus IV contrast injection.   Under concurrent supervision axial, sagittal, coronal,  three-dimensional, and MIP data sets were constructed on an independent  work station.     Comparison is made with 01/19/2023.     Detail is affected by patient size and beam attenuation.     Cardiomegaly.  CABG changes.     Normal size thoracic aorta.  Limited visualization of small distal arterial branches  though the  appearance of the pulmonary arteries is unchanged compared with 7 months  ago.  No convincing evidence of pulmonary embolus.     The lung show mild patchy atelectasis.     Summary:  1. Stable chest CT appearance compared with 7 months ago.  2. No convincing evidence of pulmonary embolus.                                   This report was finalized on 08/21/2023 21:21 by Dr. Gomez Mcgill MD.    CT Head Without Contrast [[id number]] Collected: 08/21/23 2116     Updated: 08/21/23 2120    Narrative:      EXAMINATION: CT HEAD WO CONTRAST-      8/21/2023 8:47 PM CDT     HISTORY: Syncope. Loss of consciousness. Chest pain.     In order to have a CT radiation dose as low as reasonably achievable  Automated Exposure Control was utilized for adjustment of the mA and/or  KV according to patient size.     DLP in mGycm= 774.     Comparison is made with 03/04/2022.     Axial, sagittal, and coronal noncontrast CT imaging of the head.     The visualized paranasal sinuses are clear.     The brain and ventricles have an age appropriate appearance.      There is no hemorrhage or mass-effect.   No acute infarction is seen.     No calvarial abnormality.       Impression:      1. No acute intracranial abnormality is seen.                                         This report was finalized on 08/21/2023 21:17 by Dr. Gomez Mcgill MD.    XR Chest 1 View [[id number]] Collected: 08/21/23 1658     Updated: 08/21/23 1702    Narrative:      EXAMINATION: XR CHEST 1 VW-     8/21/2023 4:45 PM CDT     HISTORY: chest pain     1 view chest x-ray.     Comparison is made with 06/14/2023.     Magnified heart size.  Heart size is stable.  CABG changes.  Cervical spine fusion hardware.     Mild chronic interstitial lung disease with a few granulomas.     No pneumonia, pneumothorax, or congestive failure.     Summary:  1. Stable chronic lung changes.  2. No acute abnormality is seen.  This report was finalized on 08/21/2023 16:58 by Dr. Dyer  "MD Nano.            I have reviewed the patient's current medications.     Assessment/Plan   Assessment  Active Hospital Problems    Diagnosis     **Syncope and collapse     Syncope     Chest pain     Nausea in adult     Poorly-controlled hypertension     Stage 3b chronic kidney disease     Class 3 severe obesity due to excess calories with body mass index (BMI) of 40.0 to 44.9 in adult     Anemia due to chronic kidney disease     Type 2 diabetes mellitus with hyperglycemia, with long-term current use of insulin        Treatment Plan  Orthostatic vitals.    Syncope and collapse likely vasovagal.    Transthoracic echocardiogram.  Results for orders placed during the hospital encounter of 01/19/23    Adult Transthoracic Echo Complete W/ Cont if Necessary Per Protocol    Interpretation Summary    The study is technically difficult for diagnosis.    Left ventricular systolic function is low normal. Left ventricular ejection fraction appears to be 51 - 55%.    Left ventricular wall thickness is consistent with moderate concentric hypertrophy.    Normal right ventricular cavity size and systolic function noted.    No hemodynamically significant valvular abnormalities identified on this study.    Uncontrolled type 2 diabetes with hyperglycemia.  Blood glucose 520 on admission.  A1c 10.1 on 8/16/2023.  Continue Accu-Cheks with sliding scale insulin.  Jardiance.    History of coronary artery disease status post previous coronary artery bypass grafting.  Patient has a chronic chest pain that has been well-documented in the past.  Follows with Dr. Garay as outpatient last seen 8/3/2023. Continue aspirin, coreg, statin.     Has had \"sweating episodes\" per wife.  Afebrile.  Normal WBC.  COVID/flu negative.  CT angiogram showed no pneumonia.  UA not consistent with UTI.  No signs of infection.    Known CKD stage 3b followed by Dr. Lomas.   Creatinine stable at baseline.    Antiemetics as needed.  Protonix.  He " had an upper GI endoscopy in February 2021 by Dr. Hannah -revealed esophagitis. Has an upcoming appointment with GI as outpatient.    SCDs for DVT prophylaxis.    Wound care consult.    Medical Decision Making  Number and Complexity of problems: 1 acute problem in the form of syncope and collapse likely vasovagal  Differential Diagnosis: None considered at present    Conditions and Status        Condition is improving.     [facility] Data  External documents reviewed: prior epic records  Cardiac tracing (EKG, telemetry) interpretation: none  Radiology interpretation: Interpreted by radiology  Labs reviewed: As above  Any tests that were considered but not ordered: None considered at present     Decision rules/scores evaluated (example [id number], Wells, etc): None considered at present     Discussed with: Patient and Dr. Rollins     Care Planning  Shared decision making: Patient is agreeable to ongoing work-up and treatment  Code status and discussions: Full code with full interventions    Disposition  Social Determinants of Health that impact treatment or disposition: None identified at present  I expect the patient to be discharged to home in 1-2 days.     Electronically signed by SUSAN Roger, 08/22/23, 11:21 CDT.

## 2023-08-22 NOTE — PLAN OF CARE
Problem: Adult Inpatient Plan of Care  Goal: Plan of Care Review  8/22/2023 0542 by Faviola Amaya RN  Outcome: Ongoing, Progressing  Flowsheets (Taken 8/22/2023 0542)  Progress: improving  Plan of Care Reviewed With: patient  Outcome Evaluation: Pt A&Ox4, VSS. No c/o chest pain since arrival to room. Nausea improved per pt. BG monitored. NSR/SB on telemetry. Room air, CPOX. Pt has large wound to bottom of left foot, new dressing applied. Voiding. Safety maintained, will continue to monitor.

## 2023-08-22 NOTE — H&P
"    [facility] Medicine Services  HISTORY AND PHYSICAL    Date of Admission: 8/21/2023  Primary Care Physician: Del Shetty MD    Subjective   Primary Historian: Patient and wife    Chief Complaint: Syncope and collapse    History of Present Illness  Erick Luong is a 68-year-old male with a past medical history of coronary artery disease, diastolic dysfunction followed by Dr. Garay, vascular disease with an upcoming appointment with vascular surgeon, GERD now with chronic nausea with an upcoming appointment with GI, chronic nonhealing wound to the left foot followed by [facility] wound care, left carpal tunnel-severe with planned release per Dr. Soliz in the near future, medically noncompliant however patient and wife state that he is following patient plan as prescribed by Dr. Shetty.  Patient has a 2-week history of high glucose states he is taking 820 units of regular-500 insulin times a day, now long-acting.  Wife states his blood sugars \"go up and down\".  Apparently patient had a fall after 8/3 (saw Dr. Garay prior to fall), hitting his head which she was not aware of until today.  CT without acute findings.  He has been having episodes of profuse sweating.  I do not feel this is related to summer heat.  He was at [facility]'s today when he needed to have a bowel movement as he has had constipation.  He states he woke up on the floor.  Loss of consciousness occurred during bowel movement.  He states he got up and walked to his car and passed out again and awakened and vomited.  Wife states he has been nauseated for the past 2-3 weeks, awaiting evaluation by GI.  Patient presented to [facility] emergency department for evaluation.  Initially reported chest discomfort however that has resolved.  Work-up revealed glucose greater than 500, elevated troponins however they are declining.  He reports no neurological changes, headache, changes in vision, dizziness.  " Patient is hypertensive.  Glucose has improved with treatment, most recent 301.  We will start long-acting, prandial and AC/at bedtime coverage.  Home medications have been reviewed and restarted.  He is admitted for further evaluation and treatment.    Review of Systems   Otherwise complete ROS reviewed and negative except as mentioned in the HPI.    Past Medical History:   Past Medical History:   Diagnosis Date    Arthritis     Autonomic disease     CAD (coronary artery disease) 02/06/2017    Cervical radiculopathy 09/16/2021    Chronic constipation with acute exaccerbation 05/10/2021    Coronary artery disease     Degeneration of cervical intervertebral disc 08/11/2021    Diabetes mellitus     Diabetic foot ulcer 08/31/2020    Diabetic polyneuropathy associated with type 2 diabetes mellitus 01/18/2021    Elevated cholesterol     Gastroesophageal reflux disease 05/13/2019    Headache     HTN (hypertension), benign 05/03/2017    Hyperlipidemia     Hypertension     Mixed hyperlipidemia 02/07/2017    Multiple lung nodules 01/26/2020    5mm, 9 mm RLL identified 1/2020, not present 10/2019.    Myocardial infarction     Osteomyelitis 01/22/2020    Osteomyelitis of fifth toe of right foot 10/07/2019    Pancreatitis     Persistent insomnia 01/20/2020    Renal disorder     Sleep apnea 02/06/2017    Sleep apnea with use of continuous positive airway pressure (CPAP)     NON-COMPLIANT    Slow transit constipation 01/16/2019    Spinal stenosis in cervical region 09/16/2021    Vitamin D deficiency 03/02/2021     Past Surgical History:  Past Surgical History:   Procedure Laterality Date    ABDOMINAL SURGERY      AMPUTATION FOOT / TOE Left 10/2021    5th digit     ANTERIOR CERVICAL DISCECTOMY W/ FUSION N/A 8/5/2022    Procedure: CERVICAL DISCECTOMY ANTERIOR WITH FUSION C5-6 with possible plating of C5-7 with neuromonitoring and 1 c-arm;  Surgeon: Karel Soliz MD;  Location: [facility];  Service: Neurosurgery;  Laterality:  "N/A;    APPENDECTOMY      BACK SURGERY      CARDIAC CATHETERIZATION Left 02/08/2021    Procedure: Left Heart Cath w poss intervention left anatomical snuff box acess;  Surgeon: Omkar Charles DO;  Location:  [facility] CATH INVASIVE LOCATION;  Service: Cardiology;  Laterality: Left;    CARDIAC SURGERY      CATARACT EXTRACTION      CERVICAL SPINE SURGERY      COLONOSCOPY N/A 01/31/2017    Normal exam repeat in 5 years    COLONOSCOPY N/A 02/11/2019    Mild acute inflammation    COLONOSCOPY W/ POLYPECTOMY  03/04/2014    Hyperplastic polyp    CORONARY ARTERY BYPASS GRAFT  10/2015    ENDOSCOPY  04/13/2011    Gastritis with hemorrhage    ENDOSCOPY N/A 05/05/2017    Normal exam    ENDOSCOPY N/A 02/11/2019    Gastritis    ENDOSCOPY N/A 09/01/2020    Non-erosive gastritis with hemorrhage    ENDOSCOPY N/A 02/10/2021    Esophagitis    FOOT SURGERY Left     INCISION AND DRAINAGE OF WOUND Left 09/2007    spider bite     Social History:  reports that he quit smoking about 32 years ago. His smoking use included cigarettes. He has never used smokeless tobacco. He reports that he does not drink alcohol and does not use drugs.    Family History: family history includes Alzheimer's disease in his mother; Colon cancer in his father and sister; Colon polyps in his sister; Coronary artery disease in his sister and sister; Heart disease in his father.       Allergies:  Allergies   Allergen Reactions    Cefepime Hives and Anaphylaxis    Bactrim [Sulfamethoxazole-Trimethoprim] Other (See Comments)     \"RENAL FAILURE\"    Vancomycin Itching    Zolpidem Unknown - High Severity     \"makes him crazy\"    Zolpidem Tartrate Unknown - Low Severity and Provider Review Needed    Metronidazole Rash       Medications:  Prior to Admission medications    Medication Sig Start Date End Date Taking? Authorizing Provider   amitriptyline (ELAVIL) 25 MG tablet Take 2 tablets by mouth Daily at bedtime. 2/16/23      Aspirin 81 MG capsule Take 81 mg by " mouth Daily.    Provider, MD Bossman   Azelastine HCl 137 MCG/SPRAY solution Use 2 sprays into the nostril(s) as directed by provider 2 (Two) Times a Day. 2/16/23      bumetanide (BUMEX) 2 MG tablet Take 1 tablet twice a day as needed for a 3 pound weight gain 6/21/23   Payam Keyes DO   busPIRone (BUSPAR) 10 MG tablet Take 1 tablet by mouth 2 (Two) Times a Day As Needed. 6/21/23   Payam Keyes DO   calcitriol (ROCALTROL) 0.5 MCG capsule Take 1 capsule by mouth Daily. 2/23/23      carvedilol (COREG) 6.25 MG tablet Take 1 tablet by mouth 2 (Two) Times a Day. 12/22/22      Cholecalciferol 125 MCG (5000 UT) tablet Take 1 tablet by mouth Daily with meal 5/27/22      cloNIDine (CATAPRES) 0.1 MG tablet Take 1 tablet by mouth Every 12 (Twelve) Hours. 6/21/23   Payam Keyes DO   Diclofenac Sodium (VOLTAREN) 1 % gel gel Apply 2 g topically to the appropriate area as directed 4 (Four) Times a Day As Needed. 6/1/23      donepezil (ARICEPT) 10 MG tablet Take 1 tablet by mouth Daily. 7/20/22      Dulaglutide (Trulicity) 4.5 MG/0.5ML solution pen-injector Inject 0.5 mL under the skin into the appropriate area as directed 1 (One) Time Per Week. 7/24/23      empagliflozin (JARDIANCE) 25 MG tablet tablet Take 1 tablet by mouth Daily. 6/22/23   Payam Keyes DO   HYDROcodone-acetaminophen (NORCO) 7.5-325 MG per tablet Take 1 tablet by mouth 2 (Two) Times a Day As Needed. 8/11/23      Insulin Regular Human, Conc, (HumuLIN R U-500 KwikPen) 500 UNIT/ML solution pen-injector CONCENTRATED injection Inject 100 Units under the skin into the appropriate area as directed 3 (Three) Times a Day Before Meals.    ProviderBossman MD   Insulin Regular Human, Conc, (HumuLIN R U-500 KwikPen) 500 UNIT/ML solution pen-injector CONCENTRATED injection Inject 120 Units under the skin into the appropriate area as directed 2 (Two) Times a Day with breakfast and dinner. 7/10/23      ondansetron ODT (ZOFRAN-ODT) 4 MG  "disintegrating tablet Place 1 tablet on the tongue Every 6 (Six) Hours As Needed for Nausea or Vomiting. 7/22/23   Eve James APRN   ondansetron ODT (ZOFRAN-ODT) 8 MG disintegrating tablet Place 1 tablet under tongue 3 times a day as needed. 8/16/23      pantoprazole (Protonix) 40 MG EC tablet Take 1 tablet by mouth 2 (Two) Times a Day. 11/8/22      pregabalin (LYRICA) 100 MG capsule Take 1 capsule by mouth Daily With Breakfast AND 2 capsules Every Night. 7/25/23      rosuvastatin (CRESTOR) 40 MG tablet Take 1 tablet by mouth Every Night. 3/18/22      sodium hypochlorite (DAKIN'S 1/4 STRENGTH) 0.125 % solution topical solution 0.125% Apply to affected area twice daily 7/17/23      sodium hypochlorite (DAKIN'S) 0.25 % topical solution Apply topically to the appropriate area as directed Daily. 6/28/23      tamsulosin (FLOMAX) 0.4 MG capsule 24 hr capsule Take 1 capsule by mouth Daily. 8/7/23      spironolactone (ALDACTONE) 25 MG tablet Take 1 tablet by mouth Daily. 9/28/20 12/31/20  Del Shetty MD     I have utilized all available immediate resources to obtain, update, or review the patient's current medications (including all prescriptions, over-the-counter products, herbals, cannabis/cannabidiol products, and vitamin/mineral/dietary (nutritional) supplements).    Objective     Vital Signs: /74   Pulse 60   Temp 98 øF (36.7 øC)   Resp 20   Ht 182.9 cm (72\")   Wt (!) 146 kg (322 lb)   SpO2 98%   BMI 43.67 kg/mý   Physical Exam  Vitals reviewed.   Constitutional:       Appearance: He is obese.   HENT:      Head: Normocephalic and atraumatic.      Mouth/Throat:      Mouth: Mucous membranes are moist.      Pharynx: Oropharynx is clear.   Eyes:      Extraocular Movements: Extraocular movements intact.      Conjunctiva/sclera: Conjunctivae normal.   Cardiovascular:      Rate and Rhythm: Normal rate and regular rhythm.   Pulmonary:      Effort: Pulmonary effort is normal.      Breath sounds: Normal " breath sounds.   Abdominal:      Palpations: Abdomen is soft.      Comments: Protuberant   Musculoskeletal:      Cervical back: Normal range of motion and neck supple.      Right lower leg: No edema.      Left lower leg: No edema.      Comments: Deconditioned state   Skin:     General: Skin is warm.      Comments: Nonhealing wound left foot, boot and use, was seen today by wound care, will not undress   Neurological:      General: No focal deficit present.      Mental Status: He is alert and oriented to person, place, and time.   Psychiatric:         Mood and Affect: Mood normal.         Behavior: Behavior normal.        Results Reviewed:  Lab Results (last 24 hours)       Procedure Component Value Units Date/Time    POC Glucose Once [[id number]]  (Abnormal) Collected: 08/21/23 2230    Specimen: Blood Updated: 08/21/23 2241     Glucose 301 mg/dL      Comment: : [id number] Lazarevic NicoleMeter ID: [id number]       POC Glucose Once [[id number]]  (Abnormal) Collected: 08/21/23 2137    Specimen: Blood Updated: 08/21/23 2148     Glucose 365 mg/dL      Comment: : [id number] LazareItandi NicoleMeter ID: [id number]       High Sensitivity Troponin T 2Hr [[id number]]  (Abnormal) Collected: 08/21/23 2045    Specimen: Blood Updated: 08/21/23 2113     HS Troponin T 42 ng/L      Troponin T Delta -3 ng/L     POC Glucose Once [[id number]]  (Abnormal) Collected: 08/21/23 2038    Specimen: Blood Updated: 08/21/23 2050     Glucose 408 mg/dL      Comment: : [id number] Leslie KristyMeter ID: [id number]       D-dimer, Quantitative [[id number]]  (Abnormal) Collected: 08/21/23 1643    Specimen: Blood Updated: 08/21/23 1936     D-Dimer, Quantitative 0.83 MCGFEU/mL     Urinalysis, Microscopic Only - Urine, Clean Catch [[id number]]  (Abnormal) Collected: 08/21/23 1915    Specimen: Urine, Clean Catch Updated: 08/21/23 1930     RBC, UA 3-5 /HPF      WBC, UA None Seen /HPF      Bacteria, UA None Seen /HPF      Squamous Epithelial Cells,  UA None Seen /HPF      Hyaline Casts, UA None Seen /LPF      Methodology Automated Microscopy    Urinalysis With Microscopic If Indicated (No Culture) - Urine, Clean Catch [[id number]]  (Abnormal) Collected: 08/21/23 1915    Specimen: Urine, Clean Catch Updated: 08/21/23 1930     Color, UA Yellow     Appearance, UA Clear     pH, UA 5.5     Specific Gravity, UA >=1.030     Glucose, UA >=1000 mg/dL (3+)     Ketones, UA Negative     Bilirubin, UA Negative     Blood, UA Trace     Protein, UA >=300 mg/dL (3+)     Leuk Esterase, UA Negative     Nitrite, UA Negative     Urobilinogen, UA 1.0 E.U./dL    Blood Gas, Arterial With Co-Ox [[id number]]  (Abnormal) Collected: 08/21/23 1921    Specimen: Arterial Blood Updated: 08/21/23 1922     Site Left Radial     Dae's Test Positive     pH, Arterial 7.418 pH units      pCO2, Arterial 39.4 mm Hg      pO2, Arterial 67.5 mm Hg      Comment: 84 Value below reference range        HCO3, Arterial 25.4 mmol/L      Base Excess, Arterial 0.8 mmol/L      O2 Saturation, Arterial 94.8 %      Hemoglobin, Blood Gas 10.7 g/dL      Comment: 84 Value below reference range        Hematocrit, Blood Gas 32.9 %      Comment: 84 Value below reference range        Oxyhemoglobin 92.5 %      Comment: 84 Value below reference range        Methemoglobin 0.50 %      Carboxyhemoglobin 1.9 %      A-a DO2 36.1 mmHg      Temperature 37.0 C      Sodium, Arterial 138 mmol/L      Potassium, Arterial 4.1 mmol/L      Barometric Pressure for Blood Gas 752 mmHg      Modality Room Air     Ventilator Mode NA     Collected by [id number]     Comment: Meter: [id number]     :  [id number]        pH, Temp Corrected 7.418 pH Units      pCO2, Temperature Corrected 39.4 mm Hg      pO2, Temperature Corrected 67.5 mm Hg     Acetone [[id number]]  (Normal) Collected: 08/21/23 1754    Specimen: Blood Updated: 08/21/23 1908     Acetone Negative    Comprehensive Metabolic Panel [[id number]]  (Abnormal) Collected: 08/21/23 1754     Specimen: Blood Updated: 08/21/23 1843     Glucose 520 mg/dL      BUN 27 mg/dL      Creatinine 2.11 mg/dL      Sodium 136 mmol/L      Potassium 4.4 mmol/L      Chloride 100 mmol/L      CO2 23.0 mmol/L      Calcium 9.3 mg/dL      Total Protein 7.2 g/dL      Albumin 4.2 g/dL      ALT (SGPT) 11 U/L      AST (SGOT) 11 U/L      Alkaline Phosphatase 85 U/L      Total Bilirubin 0.4 mg/dL      Globulin 3.0 gm/dL      A/G Ratio 1.4 g/dL      BUN/Creatinine Ratio 12.8     Anion Gap 13.0 mmol/L      eGFR 33.7 mL/min/1.73     BNP [[id number]]  (Abnormal) Collected: 08/21/23 1754    Specimen: Blood Updated: 08/21/23 1821     proBNP 2,878.0 pg/mL     High Sensitivity Troponin T [[id number]]  (Abnormal) Collected: 08/21/23 1754    Specimen: Blood Updated: 08/21/23 1820     HS Troponin T 45 ng/L     COVID-19 and FLU A/B PCR - Swab, Nasopharynx [[id number]]  (Normal) Collected: 08/21/23 1645    Specimen: Swab from Nasopharynx Updated: 08/21/23 1731     COVID19 Not Detected     Influenza A PCR Not Detected     Influenza B PCR Not Detected    aPTT [[id number]]  (Normal) Collected: 08/21/23 1643    Specimen: Blood Updated: 08/21/23 1700     PTT 27.8 seconds     Protime-INR [[id number]]  (Normal) Collected: 08/21/23 1643    Specimen: Blood Updated: 08/21/23 1700     Protime 13.0 Seconds      INR 0.97    CBC Auto Differential [[id number]]  (Abnormal) Collected: 08/21/23 1643    Specimen: Blood Updated: 08/21/23 1653     WBC 9.99 10*3/mm3      RBC 3.80 10*6/mm3      Hemoglobin 10.4 g/dL      Hematocrit 32.9 %      MCV 86.6 fL      MCH 27.4 pg      MCHC 31.6 g/dL      RDW 15.3 %      RDW-SD 47.8 fl      MPV 11.8 fL      Platelets 254 10*3/mm3      Neutrophil % 79.0 %      Lymphocyte % 10.4 %      Monocyte % 7.8 %      Eosinophil % 1.8 %      Basophil % 0.5 %      Immature Grans % 0.5 %      Neutrophils, Absolute 7.89 10*3/mm3      Lymphocytes, Absolute 1.04 10*3/mm3      Monocytes, Absolute 0.78 10*3/mm3      Eosinophils, Absolute 0.18 10*3/mm3       Basophils, Absolute 0.05 10*3/mm3      Immature Grans, Absolute 0.05 10*3/mm3      nRBC 0.0 /100 WBC           Imaging Results (Last 24 Hours)       Procedure Component Value Units Date/Time    CT Angiogram Chest [[id number]] Collected: 08/21/23 2117     Updated: 08/21/23 2124    Narrative:      EXAMINATION: CT ANGIOGRAM CHEST-      8/21/2023 8:47 PM CDT     HISTORY: Syncope. Loss of consciousness. Chest pain.     In order to have a CT radiation dose as low as reasonably achievable  Automated Exposure Control was utilized for adjustment of the mA and/or  KV according to patient size.     DLP in mGycm= 263.     CT angiogram chest with IV contrast.  CT angiography protocol.   CT imaging with bolus IV contrast injection.   Under concurrent supervision axial, sagittal, coronal,  three-dimensional, and MIP data sets were constructed on an independent  work station.     Comparison is made with 01/19/2023.     Detail is affected by patient size and beam attenuation.     Cardiomegaly.  CABG changes.     Normal size thoracic aorta.  Limited visualization of small distal arterial branches though the  appearance of the pulmonary arteries is unchanged compared with 7 months  ago.  No convincing evidence of pulmonary embolus.     The lung show mild patchy atelectasis.     Summary:  1. Stable chest CT appearance compared with 7 months ago.  2. No convincing evidence of pulmonary embolus.         This report was finalized on 08/21/2023 21:21 by Dr. Gomez Mcgill MD.    CT Head Without Contrast [[id number]] Collected: 08/21/23 2116     Updated: 08/21/23 2120    Narrative:      EXAMINATION: CT HEAD WO CONTRAST-      8/21/2023 8:47 PM CDT     HISTORY: Syncope. Loss of consciousness. Chest pain.     In order to have a CT radiation dose as low as reasonably achievable  Automated Exposure Control was utilized for adjustment of the mA and/or  KV according to patient size.     DLP in mGycm= 774.     Comparison is made with 03/04/2022.      Axial, sagittal, and coronal noncontrast CT imaging of the head.     The visualized paranasal sinuses are clear.     The brain and ventricles have an age appropriate appearance.      There is no hemorrhage or mass-effect.   No acute infarction is seen.     No calvarial abnormality.       Impression:      1. No acute intracranial abnormality is seen.         This report was finalized on 08/21/2023 21:17 by Dr. Gomez Mcgill MD.    XR Chest 1 View [[id number]] Collected: 08/21/23 1658     Updated: 08/21/23 1702    Narrative:      EXAMINATION: XR CHEST 1 VW-     8/21/2023 4:45 PM CDT     HISTORY: chest pain     1 view chest x-ray.     Comparison is made with 06/14/2023.     Magnified heart size.  Heart size is stable.  CABG changes.  Cervical spine fusion hardware.     Mild chronic interstitial lung disease with a few granulomas.     No pneumonia, pneumothorax, or congestive failure.     Summary:  1. Stable chronic lung changes.  2. No acute abnormality is seen.  This report was finalized on 08/21/2023 16:58 by Dr. Gomez Mcgill MD.            Assessment / Plan   Assessment:   Active Hospital Problems    Diagnosis     **Syncope and collapse     Chest pain     Nausea in adult     Poorly-controlled hypertension     Stage 3b chronic kidney disease     Class 3 severe obesity due to excess calories with body mass index (BMI) of 40.0 to 44.9 in adult     Anemia due to chronic kidney disease     Type 2 diabetes mellitus with hyperglycemia, with long-term current use of insulin        Treatment Plan  1.  The patient will be admitted to Dr. Alicia service here at [facility].  Observation, remote telemetry  2.  Add Levemir 50 units nightly, Humalog 25 units before each meal, Humalog sliding scale coverage before meals and at bedtime  3.  Orthostatic blood pressures every 8 hours, echocardiogram with bubble study in a.m.  4.  Home medications reviewed and restarted as appropriate  5.  DVT prophylaxis with SCDs  6.   Scheduled Zofran 4 mg every 6 hours  7.  Supplemental oxygen  8.  Consult wound nurse for ongoing management of nonhealing wound left foot  9.  Fall precautions, daily weights  10.  Labs in a.m.    Medical Decision Making  Number and Complexity of problems: 7  Differential Diagnosis: Medical noncompliance    Conditions and Status        Condition is unchanged.     [facility] Data  External documents reviewed: No  Cardiac tracing (EKG, telemetry) interpretation: Reviewed  Radiology interpretation: Reviewed  Labs reviewed: Yes  Any tests that were considered but not ordered: No     Decision rules/scores evaluated (example [id number], Wells, etc): No     Discussed with: Patient, wife Joan and Dr. Alicia     Care Planning  Shared decision making: Patient, wife Joan and Dr. Alicia  Code status and discussions: Full    Disposition  Social Determinants of Health that impact treatment or disposition: Concerns for medical compliance  Estimated length of stay is 1-2 days.     I confirmed that the patient's advanced care plan is present, code status is documented, and a surrogate decision maker is listed in the patient's medical record.     The patient's surrogate decision maker is stephan Franco.     The patient was seen and examined by me on 8/21/2023 at 10:59 PM.    Electronically signed by SUSAN Ash, 08/21/23, 23:50 CDT.

## 2023-08-22 NOTE — PLAN OF CARE
Goal Outcome Evaluation:  Plan of Care Reviewed With: patient        Progress: improving  Outcome Evaluation: Ntn assessment completed. Pt consumed all of his lunch today. Provided pt with healthy eating for diabetes. Pt able to provide some examples of carbohydrates. Pt states he usually only eats two meals per day. Encouraged consistent meal intake. Written material provided with RDN contact information. Cont to follow for plan of care.

## 2023-08-22 NOTE — PLAN OF CARE
Goal Outcome Evaluation:   Pt alert and oriented x4. VSS, ortho stats [id number]. no c/o pain. C/o nausea off and on. Scheduled Zofran.  DYE. PPP. SCDS  for VTE. . Tolerating cardiac/ carb  diet. Skin intact. Voiding via restroom. Last BM 8/22/23. BS monitoring Ac/HS. Blood sugars was 79 this am.  Isolation maintained , hx MRSA contact.  D/c plans pending PT?OT recommendation and medically clear. Patient went for echo today. . Call light within reach. Safety maintained.

## 2023-08-22 NOTE — ED PROVIDER NOTES
Subjective   History of Present Illness  Patient is a 68-year-old male who presents to the ER due to chest pain and syncope.  Patient states he has been suffering from constipation.  He states he had to have a bowel movement while he was shopping at [employer].  Patient states he woke up in the floor at [employer] and had apparently passed out while having a bowel movement.  Patient states he got up and walked outside to drive home and had another syncopal episode in the parking lot.  He states he had been diaphoretic and was hot all over.  He states all throughout the week he has had syncopal episodes.  Patient complains of chest discomfort described as a pressure to his substernal region.  He states he has had passing out episodes previously but not as often as he has this past week.  Patient denies any chest pain currently.  Past medical history significant for coronary artery disease with history of CABG, diabetes, hyperlipidemia, hypertension, lung nodules, osteomyelitis, renal disorder, sleep apnea, obesity      Review of Systems   Constitutional: Negative.  Negative for fever.   HENT: Negative.  Negative for congestion.    Respiratory:  Positive for shortness of breath. Negative for cough.    Cardiovascular:  Positive for chest pain.   Gastrointestinal:  Positive for constipation and nausea. Negative for abdominal pain, diarrhea and vomiting.   Genitourinary: Negative.  Negative for dysuria.   Musculoskeletal: Negative.  Negative for back pain.   Skin: Negative.    Neurological:  Positive for syncope.   All other systems reviewed and are negative.    Past Medical History:   Diagnosis Date    Arthritis     Autonomic disease     CAD (coronary artery disease) 02/06/2017    Cervical radiculopathy 09/16/2021    Chronic constipation with acute exaccerbation 05/10/2021    Coronary artery disease     Degeneration of cervical intervertebral disc 08/11/2021    Diabetes mellitus     Diabetic foot ulcer 08/31/2020    Diabetic  "polyneuropathy associated with type 2 diabetes mellitus 01/18/2021    Elevated cholesterol     Gastroesophageal reflux disease 05/13/2019    Headache     HTN (hypertension), benign 05/03/2017    Hyperlipidemia     Hypertension     Mixed hyperlipidemia 02/07/2017    Multiple lung nodules 01/26/2020    5mm, 9 mm RLL identified 1/2020, not present 10/2019.    Myocardial infarction     Osteomyelitis 01/22/2020    Osteomyelitis of fifth toe of right foot 10/07/2019    Pancreatitis     Persistent insomnia 01/20/2020    Renal disorder     Sleep apnea 02/06/2017    Sleep apnea with use of continuous positive airway pressure (CPAP)     NON-COMPLIANT    Slow transit constipation 01/16/2019    Spinal stenosis in cervical region 09/16/2021    Vitamin D deficiency 03/02/2021       Allergies   Allergen Reactions    Cefepime Hives and Anaphylaxis    Bactrim [Sulfamethoxazole-Trimethoprim] Other (See Comments)     \"RENAL FAILURE\"    Vancomycin Itching    Zolpidem Unknown - High Severity     \"makes him crazy\"    Zolpidem Tartrate Unknown - Low Severity and Provider Review Needed    Metronidazole Rash       Past Surgical History:   Procedure Laterality Date    ABDOMINAL SURGERY      AMPUTATION FOOT / TOE Left 10/2021    5th digit     ANTERIOR CERVICAL DISCECTOMY W/ FUSION N/A 8/5/2022    Procedure: CERVICAL DISCECTOMY ANTERIOR WITH FUSION C5-6 with possible plating of C5-7 with neuromonitoring and 1 c-arm;  Surgeon: Karel Soliz MD;  Location:  [facility] OR;  Service: Neurosurgery;  Laterality: N/A;    APPENDECTOMY      BACK SURGERY      CARDIAC CATHETERIZATION Left 02/08/2021    Procedure: Left Heart Cath w poss intervention left anatomical snuff box acess;  Surgeon: Omkar Charles DO;  Location:  [facility] CATH INVASIVE LOCATION;  Service: Cardiology;  Laterality: Left;    CARDIAC SURGERY      CATARACT EXTRACTION      CERVICAL SPINE SURGERY      COLONOSCOPY N/A 01/31/2017    Normal exam repeat in 5 years    COLONOSCOPY N/A " 2019    Mild acute inflammation    COLONOSCOPY W/ POLYPECTOMY  2014    Hyperplastic polyp    CORONARY ARTERY BYPASS GRAFT  10/2015    ENDOSCOPY  2011    Gastritis with hemorrhage    ENDOSCOPY N/A 2017    Normal exam    ENDOSCOPY N/A 2019    Gastritis    ENDOSCOPY N/A 2020    Non-erosive gastritis with hemorrhage    ENDOSCOPY N/A 02/10/2021    Esophagitis    FOOT SURGERY Left     INCISION AND DRAINAGE OF WOUND Left 2007    spider bite       Family History   Problem Relation Age of Onset    Colon cancer Father     Heart disease Father     Colon cancer Sister     Colon polyps Sister     Alzheimer's disease Mother     Coronary artery disease Sister     Coronary artery disease Sister        Social History     Socioeconomic History    Marital status:    Tobacco Use    Smoking status: Former     Types: Cigarettes     Quit date:      Years since quittin.6    Smokeless tobacco: Never    Tobacco comments:     [place] [employer]   Vaping Use    Vaping Use: Never used   Substance and Sexual Activity    Alcohol use: No    Drug use: No    Sexual activity: Defer           Objective   Physical Exam  Vitals and nursing note reviewed.   Constitutional:       General: He is not in acute distress.     Appearance: He is well-developed. He is not diaphoretic.   HENT:      Head: Atraumatic.      Nose: Nose normal.   Eyes:      General: No scleral icterus.     Conjunctiva/sclera: Conjunctivae normal.      Pupils: Pupils are equal, round, and reactive to light.   Neck:      Thyroid: No thyromegaly.      Vascular: No JVD.   Cardiovascular:      Rate and Rhythm: Normal rate and regular rhythm.      Heart sounds: Normal heart sounds. No murmur heard.  Pulmonary:      Effort: Pulmonary effort is normal. No respiratory distress.      Breath sounds: Normal breath sounds. No wheezing or rales.   Chest:      Chest wall: No tenderness.   Abdominal:      General: Bowel sounds are normal. There  is no distension.      Palpations: Abdomen is soft. There is no mass.      Tenderness: There is no abdominal tenderness. There is no guarding or rebound.   Musculoskeletal:         General: Normal range of motion.      Cervical back: Normal range of motion and neck supple.   Lymphadenopathy:      Cervical: No cervical adenopathy.   Skin:     General: Skin is warm and dry.      Coloration: Skin is not pale.      Findings: No erythema or rash.   Neurological:      Mental Status: He is alert and oriented to person, place, and time.      Cranial Nerves: No cranial nerve deficit.      Coordination: Coordination normal.      Deep Tendon Reflexes: Reflexes are normal and symmetric.   Psychiatric:         Behavior: Behavior normal.         Thought Content: Thought content normal.         Judgment: Judgment normal.       Procedures           ED Course     HEART Score for Major Cardiac Events - MDCalc  Calculated on Aug 21 2023 11:48 PM  6 points -> Moderate Score (4-6 points) Risk of MACE of 12-16.6%.  If troponin is positive, many experts recommend further workup and admission even with a low HEART Score.                                           Medical Decision Making  Patient is a 68-year-old male who presents to the ER due to chest pain and syncope.  Patient states he has been suffering from constipation.  He states he had to have a bowel movement while he was shopping at [employer].  Patient states he woke up in the floor at [employer] and had apparently passed out while having a bowel movement.  Patient states he got up and walked outside to drive home and had another syncopal episode in the parking lot.  He states he had been diaphoretic and was hot all over.  He states all throughout the week he has had syncopal episodes.  Patient complains of chest discomfort described as a pressure to his substernal region.  He states he has had passing out episodes previously but not as often as he has this past week.  Patient denies any  chest pain currently.  Past medical history significant for coronary artery disease with history of CABG, diabetes, hyperlipidemia, hypertension, lung nodules, osteomyelitis, renal disorder, sleep apnea, obesity  Differential diagnosis: Vasovagal syncope, PE, ACS, pneumonia, and other    Labs Reviewed  COMPREHENSIVE METABOLIC PANEL - Abnormal; Notable for the following components:     Glucose                       520 (*)                BUN                           27 (*)                 Creatinine                    2.11 (*)               eGFR                          33.7 (*)            All other components within normal limits         Narrative: GFR Normal >60                  Chronic Kidney Disease <60                  Kidney Failure <15                    TROPONIN - Abnormal; Notable for the following components:     HS Troponin T                 45 (*)              All other components within normal limits         Narrative: High Sensitive Troponin T Reference Range:                  <10.0 ng/L- Negative Female for AMI                  <15.0 ng/L- Negative Male for AMI                  >=10 - Abnormal Female indicating possible myocardial injury.                  >=15 - Abnormal Male indicating possible myocardial injury.                   Clinicians would have to utilize clinical acumen, EKG, Troponin, and serial changes to determine if it is an Acute Myocardial Infarction or myocardial injury due to an underlying chronic condition.                                       BNP (IN-HOUSE) - Abnormal; Notable for the following components:     proBNP                        2,878.0 (*)            All other components within normal limits         Narrative: Among patients with dyspnea, NT-proBNP is highly sensitive for the detection of acute congestive heart failure. In addition NT-proBNP of <300 pg/ml effectively rules out acute congestive heart failure with 99% negative predictive value.                    CBC  WITH AUTO DIFFERENTIAL - Abnormal; Notable for the following components:     RBC                           3.80 (*)               Hemoglobin                    10.4 (*)               Hematocrit                    32.9 (*)               Neutrophil %                  79.0 (*)               Lymphocyte %                  10.4 (*)               Neutrophils, Absolute         7.89 (*)            All other components within normal limits  HIGH SENSITIVITIY TROPONIN T 2HR - Abnormal; Notable for the following components:     HS Troponin T                 42 (*)              All other components within normal limits         Narrative: High Sensitive Troponin T Reference Range:                  <10.0 ng/L- Negative Female for AMI                  <15.0 ng/L- Negative Male for AMI                  >=10 - Abnormal Female indicating possible myocardial injury.                  >=15 - Abnormal Male indicating possible myocardial injury.                   Clinicians would have to utilize clinical acumen, EKG, Troponin, and serial changes to determine if it is an Acute Myocardial Infarction or myocardial injury due to an underlying chronic condition.                                       URINALYSIS W/ MICROSCOPIC IF INDICATED (NO CULTURE) - Abnormal; Notable for the following components:     Glucose, UA                     (*)                  Blood, UA                     Trace (*)               Protein, UA                     (*)               All other components within normal limits  BLOOD GAS, ARTERIAL W/CO-OXIMETRY - Abnormal; Notable for the following components:     pO2, Arterial                 67.5 (*)               Hemoglobin, Blood Gas         10.7 (*)               Hematocrit, Blood Gas         32.9 (*)               Oxyhemoglobin                 92.5 (*)               pO2, Temperature Corrected    67.5 (*)            All other components within normal limits  D-DIMER, QUANTITATIVE - Abnormal; Notable for the following  "components:     D-Dimer, Quantitative         0.83 (*)            All other components within normal limits         Narrative: According to the assay 's published package insert, a normal (<0.50 MCGFEU/mL) D-dimer result in conjunction with a non-high clinical probability assessment, excludes deep vein thrombosis (DVT) and pulmonary embolism (PE) with high sensitivity.                                    D-dimer values increase with age and this can make VTE exclusion of an older population difficult. To address this, the American College [employer], based on best available evidence and recent guidelines, recommends that clinicians use age-adjusted D-dimer thresholds in patients greater than 50 years of age with: a) a low probability of PE who do not meet all Pulmonary Embolism Rule Out Criteria, or b) in those with intermediate probability of PE.                   The formula for an age-adjusted D-dimer cut-off is \"age/100\".                  For example, a 60 year old patient would have an age-adjusted cut-off of 0.60 MCGFEU/mL and an 80 year old 0.80 MCGFEU/mL.  URINALYSIS, MICROSCOPIC ONLY - Abnormal; Notable for the following components:     RBC, UA                       3-5 (*)             All other components within normal limits  POCT GLUCOSE FINGERSTICK - Abnormal; Notable for the following components:     Glucose                       408 (*)             All other components within normal limits  POCT GLUCOSE FINGERSTICK - Abnormal; Notable for the following components:     Glucose                       365 (*)             All other components within normal limits  POCT GLUCOSE FINGERSTICK - Abnormal; Notable for the following components:     Glucose                       301 (*)             All other components within normal limits  COVID-19 AND FLU A/B, NP SWAB IN TRANSPORT MEDIA 8-12 HR TAT - Normal         Narrative: Fact sheet for providers: https://www.fda.gov/media/500499/download       "              Fact sheet for patients: https://www.fda.gov/media/466502/download                                    Test performed by PCR.  APTT - Normal  PROTIME-INR - Normal  ACETONE - Normal  BLOOD GAS, ARTERIAL W/CO-OXIMETRY  POCT GLUCOSE FINGERSTICK  POCT GLUCOSE FINGERSTICK  POCT GLUCOSE FINGERSTICK  POCT GLUCOSE FINGERSTICK  POCT GLUCOSE FINGERSTICK  POCT GLUCOSE FINGERSTICK  POCT GLUCOSE FINGERSTICK  POCT GLUCOSE FINGERSTICK  POCT GLUCOSE FINGERSTICK  POCT GLUCOSE FINGERSTICK  POCT GLUCOSE FINGERSTICK  POCT GLUCOSE FINGERSTICK  POCT GLUCOSE FINGERSTICK  POCT GLUCOSE FINGERSTICK  POCT GLUCOSE FINGERSTICK  POCT GLUCOSE FINGERSTICK  POCT GLUCOSE FINGERSTICK  POCT GLUCOSE FINGERSTICK  POCT GLUCOSE FINGERSTICK  POCT GLUCOSE FINGERSTICK  POCT GLUCOSE FINGERSTICK  POCT GLUCOSE FINGERSTICK  POCT GLUCOSE FINGERSTICK  POCT GLUCOSE FINGERSTICK  POCT GLUCOSE FINGERSTICK  POCT GLUCOSE FINGERSTICK  POCT GLUCOSE FINGERSTICK  CBC AND DIFFERENTIAL   CT Head Without Contrast   Final Result    1. No acute intracranial abnormality is seen.                                                                     This report was finalized on 08/21/2023 21:17 by Dr. Gomez Mcgill MD.     CT Angiogram Chest   Final Result     XR Chest 1 View   Final Result     Last cardiac catheterization 2/10/21:   Impression:  1.  Coronary artery disease as described above including severe native vessel disease but widely patent LIMA to LAD and widely patent SVGs to the RCA, diagonal and obtuse marginal  2.  Diabetes  3.  Hypertension  4.  Hyperlipidemia  6.  History of pancreatitis  7.  Sleep apnea  8.  Obesity     Plan:  1.  Routine post procedure orders  2.  His native coronary arteries are severely diseased, however, his LIMA and SVGs are all widely patent and supply flow to all of the critical distributions.  No intervention required at this time.  3.  Continue to work-up causes for his chest pain including assessing the GI tract as patient states  he has significant acid reflux in the past  4.  Continue on optimal medical therapy including aspirin 81, high intensity statin, ARB and beta-blocker  5.  Close follow-up with Dr. Garay in the cardiology clinic on discharge     Patient presents with chest pain and multiple episodes of syncope.  Blood sugar was elevated in the emergency department as well.  He received insulin in the ER.  Patient will be admitted to hospitalist services.  Please see their note for details.    Problems Addressed:  Chest pain, unspecified type: complicated acute illness or injury  Hyperglycemia: chronic illness or injury  Vasovagal syncope: acute illness or injury    Amount and/or Complexity of Data Reviewed  Labs: ordered. Decision-making details documented in ED Course.  Radiology: ordered. Decision-making details documented in ED Course.  ECG/medicine tests: ordered. Decision-making details documented in ED Course.  Discussion of management or test interpretation with external provider(s): Reviewed case with hospitalist who is agreeable for admission    Risk  OTC drugs.  Prescription drug management.  Decision regarding hospitalization.        Final diagnoses:   Chest pain, unspecified type   Vasovagal syncope   Hyperglycemia       ED Disposition  ED Disposition       ED Disposition   Decision to Admit    Condition   --    Comment   Level of Care: Telemetry [5]   Diagnosis: Chest pain [911882]   Admitting Physician: CATHERINE HINOJOSA [[id number]]   Certification: I Certify That Inpatient Hospital Services Are Medically Necessary For Greater Than 2 Midnights                 Del Shetty MD  [address] [place] [address]  [phone]    Follow up on 8/29/2023 TUESDAY AUGUST 29 @ 2PM WITH BARBARA DAVIS         Medication List        New Prescriptions      polyethylene glycol 17 g packet  Commonly known as: MIRALAX  Take 17 g by mouth Daily. Obtain OTC     sennosides-docusate 8.6-50 MG per tablet  Commonly known  as: PERICOLACE  Take 1 tablet by mouth Every Night. Obtain OTC            Changed      bumetanide 2 MG tablet  Commonly known as: BUMEX  Take 1 tablet by mouth Daily. Take 2 tablets in morning;1 tablet at night  What changed:   how much to take  how to take this  when to take this  additional instructions     * HumuLIN R U-500 KwikPen 500 UNIT/ML solution pen-injector CONCENTRATED injection  Generic drug: Insulin Regular Human (Conc)  Inject 120 Units under the skin into the appropriate area as directed 2 (Two) Times a Day with breakfast and dinner.  What changed:   how much to take  when to take this     * HumuLIN R U-500 KwikPen 500 UNIT/ML solution pen-injector CONCENTRATED injection  Generic drug: Insulin Regular Human (Conc)  Inject 120 Units under the skin into the appropriate area as directed 3 (Three) Times a Day Before Meals.  What changed: You were already taking a medication with the same name, and this prescription was added. Make sure you understand how and when to take each.     ondansetron ODT 8 MG disintegrating tablet  Commonly known as: ZOFRAN-ODT  Place 1 tablet under tongue 3 times a day as needed.  What changed: Another medication with the same name was removed. Continue taking this medication, and follow the directions you see here.     sodium hypochlorite 0.125 % solution topical solution 0.125%  Commonly known as: DAKIN'S 1/4 STRENGTH  Apply to affected area twice daily  What changed: Another medication with the same name was removed. Continue taking this medication, and follow the directions you see here.           * This list has 2 medication(s) that are the same as other medications prescribed for you. Read the directions carefully, and ask your doctor or other care provider to review them with you.                Stop      Azelastine HCl 137 MCG/SPRAY solution               Where to Get Your Medications        Information about where to get these medications is not yet available    Ask  your nurse or doctor about these medications  bumetanide 2 MG tablet  HumuLIN R U-500 KwikPen 500 UNIT/ML solution pen-injector CONCENTRATED injection  polyethylene glycol 17 g packet  sennosides-docusate 8.6-50 MG per tablet            Neelam Polo, APRN  08/25/23 [id number]

## 2023-08-22 NOTE — CONSULTS
[facility]  INPATIENT WOUND & OSTOMY CONSULTATION    Today's Date: 08/22/23    Patient Name: Erick Luong  MRN: [id number]  [facility]: [id number]  PCP: Del Shetty MD  Referring Provider:   Consulting Provider (From admission, onward)      Start Ordered     Status Ordering Provider    08/22/23 0702 08/21/23 2335  Inpatient Wound Care Provider Consult  IN AM        Specialty:  Wound Care  Provider:  Dunia Wolfe APRN    Acknowledged TOÑO BROWN    08/22/23 0358 08/22/23 0358  Wound / Ostomy  Care Provider Consult  Once        Specialty:  Wound Care  Provider:  Dunia Wolfe APRN    Acknowledged CATHERINE HINOJOSA           Attending Provider: Willy Rollins MD  Length of Stay: 1    SUBJECTIVE   Chief Complaint: Wound of left lateral foot, chronic        HPI: Erick Luong, a 68 y.o.male, presents with a past medical history of coronary artery disease, diabetes, hypertension, hyperlipidemia, MI, osteomyelitis, polyneuropathy, chronic kidney disease.  A full past medical history as listed below.  Patient presented to the hospital on 8/21 due to recurrent syncope and collapse with fever.    Inpatient wound care consulted due to chronic wound of left lateral foot.  Patient receives care at [facility] wound care center with use of Hydrofera Blue.  Cam boot present in room.  Patient reports debridement completed at wound care visits with wound care appointment scheduled for today.      Visit Dx:    ICD-10-CM ICD-9-CM   1. Chest pain, unspecified type  R07.9 786.50   2. Vasovagal syncope  R55 780.2   3. Hyperglycemia  R73.9 790.29       Hospital Problem List:     Syncope and collapse    Type 2 diabetes mellitus with hyperglycemia, with long-term current use of insulin    Anemia due to chronic kidney disease    Class 3 severe obesity due to excess calories with body mass index (BMI) of 40.0 to 44.9 in adult    Stage 3b chronic kidney disease    Chest pain    Nausea in adult     Poorly-controlled hypertension    Syncope      History:   Past Medical History:   Diagnosis Date    Arthritis     Autonomic disease     CAD (coronary artery disease) 02/06/2017    Cervical radiculopathy 09/16/2021    Chronic constipation with acute exaccerbation 05/10/2021    Coronary artery disease     Degeneration of cervical intervertebral disc 08/11/2021    Diabetes mellitus     Diabetic foot ulcer 08/31/2020    Diabetic polyneuropathy associated with type 2 diabetes mellitus 01/18/2021    Elevated cholesterol     Gastroesophageal reflux disease 05/13/2019    Headache     HTN (hypertension), benign 05/03/2017    Hyperlipidemia     Hypertension     Mixed hyperlipidemia 02/07/2017    Multiple lung nodules 01/26/2020    5mm, 9 mm RLL identified 1/2020, not present 10/2019.    Myocardial infarction     Osteomyelitis 01/22/2020    Osteomyelitis of fifth toe of right foot 10/07/2019    Pancreatitis     Persistent insomnia 01/20/2020    Renal disorder     Sleep apnea 02/06/2017    Sleep apnea with use of continuous positive airway pressure (CPAP)     NON-COMPLIANT    Slow transit constipation 01/16/2019    Spinal stenosis in cervical region 09/16/2021    Vitamin D deficiency 03/02/2021     Past Surgical History:   Procedure Laterality Date    ABDOMINAL SURGERY      AMPUTATION FOOT / TOE Left 10/2021    5th digit     ANTERIOR CERVICAL DISCECTOMY W/ FUSION N/A 8/5/2022    Procedure: CERVICAL DISCECTOMY ANTERIOR WITH FUSION C5-6 with possible plating of C5-7 with neuromonitoring and 1 c-arm;  Surgeon: Karel Soliz MD;  Location:  [facility] OR;  Service: Neurosurgery;  Laterality: N/A;    APPENDECTOMY      BACK SURGERY      CARDIAC CATHETERIZATION Left 02/08/2021    Procedure: Left Heart Cath w poss intervention left anatomical snuff box acess;  Surgeon: Omkar Charles DO;  Location:  [facility] CATH INVASIVE LOCATION;  Service: Cardiology;  Laterality: Left;    CARDIAC SURGERY    "   CATARACT EXTRACTION      CERVICAL SPINE SURGERY      COLONOSCOPY N/A 2017    Normal exam repeat in 5 years    COLONOSCOPY N/A 2019    Mild acute inflammation    COLONOSCOPY W/ POLYPECTOMY  2014    Hyperplastic polyp    CORONARY ARTERY BYPASS GRAFT  10/2015    ENDOSCOPY  2011    Gastritis with hemorrhage    ENDOSCOPY N/A 2017    Normal exam    ENDOSCOPY N/A 2019    Gastritis    ENDOSCOPY N/A 2020    Non-erosive gastritis with hemorrhage    ENDOSCOPY N/A 02/10/2021    Esophagitis    FOOT SURGERY Left     INCISION AND DRAINAGE OF WOUND Left 2007    spider bite     Social History     Socioeconomic History    Marital status:    Tobacco Use    Smoking status: Former     Types: Cigarettes     Quit date:      Years since quittin.6    Smokeless tobacco: Never    Tobacco comments:     smoked in [place]   Vaping Use    Vaping Use: Never used   Substance and Sexual Activity    Alcohol use: No    Drug use: No    Sexual activity: Defer     Family History   Problem Relation Age of Onset    Colon cancer Father     Heart disease Father     Colon cancer Sister     Colon polyps Sister     Alzheimer's disease Mother     Coronary artery disease Sister     Coronary artery disease Sister        Allergies:  Allergies   Allergen Reactions    Cefepime Hives and Anaphylaxis    Bactrim [Sulfamethoxazole-Trimethoprim] Other (See Comments)     \"RENAL FAILURE\"    Vancomycin Itching    Zolpidem Unknown - High Severity     \"makes him crazy\"    Zolpidem Tartrate Unknown - Low Severity and Provider Review Needed    Metronidazole Rash       Medications:    Current Facility-Administered Medications:     acetaminophen (TYLENOL) tablet 650 mg, 650 mg, Oral, Q4H PRN **OR** acetaminophen (TYLENOL) suppository 650 mg, 650 mg, Rectal, Q4H PRN, Raquel Duncan, APRN    amitriptyline (ELAVIL) tablet 50 mg, 50 mg, Oral, Nightly, Raquel Duncan, APRN, 50 " mg at 08/22/23 0143    aspirin EC tablet 81 mg, 81 mg, Oral, Daily, Raquel Duncan, APRN, 81 mg at 08/22/23 1005    sennosides-docusate (PERICOLACE) 8.6-50 MG per tablet 1 tablet, 1 tablet, Oral, Nightly, 1 tablet at 08/22/23 0143 **AND** polyethylene glycol (MIRALAX) packet 17 g, 17 g, Oral, Daily PRN **AND** bisacodyl (DULCOLAX) EC tablet 5 mg, 5 mg, Oral, Daily PRN **AND** bisacodyl (DULCOLAX) suppository 10 mg, 10 mg, Rectal, Daily PRN, Raquel Duncan, APRN    busPIRone (BUSPAR) tablet 10 mg, 10 mg, Oral, BID PRN, Raquel Duncan, APRN    calcitriol (ROCALTROL) capsule 0.5 mcg, 0.5 mcg, Oral, Daily, Raquel Duncan, APRSHERRILL, 0.5 mcg at 08/22/23 1005    carvedilol (COREG) tablet 6.25 mg, 6.25 mg, Oral, BID, Raquel Duncan, APRN, 6.25 mg at 08/22/23 1005    cloNIDine (CATAPRES) tablet 0.1 mg, 0.1 mg, Oral, Q12H, Raquel Duncan, APRN, 0.1 mg at 08/22/23 1005    dextrose (D50W) (25 g/50 mL) IV injection 25 g, 25 g, Intravenous, Q15 Min PRN, Raquel Duncan APRN    dextrose (GLUTOSE) oral gel 15 g, 15 g, Oral, Q15 Min PRN, Raquel Duncan, APRN    donepezil (ARICEPT) tablet 10 mg, 10 mg, Oral, Daily, Raquel Duncan, APRN, 10 mg at 08/22/23 1005    empagliflozin (JARDIANCE) tablet 25 mg, 25 mg, Oral, Daily, Raquel Duncan, APRN, 25 mg at 08/22/23 1005    glucagon (GLUCAGEN) injection 1 mg, 1 mg, Intramuscular, Q15 Min PRN, Raquel Duncan, APRN    HYDROcodone-acetaminophen (NORCO) 7.5-325 MG per tablet 1 tablet, 1 tablet, Oral, BID PRN, Raquel Duncan, SUSAN    insulin detemir (LEVEMIR) injection 50 Units, 50 Units, Subcutaneous, Nightly, Raquel Duncan, SUSAN, 50 Units at 08/22/23 0146    Insulin Lispro (humaLOG) injection 25 Units, 25 Units, Subcutaneous, TID With Meals, Raquel Duncan, SUSAN    Insulin Lispro (humaLOG) injection 3-14 Units, 3-14 Units, Subcutaneous, 4x Daily AC & at Bedtime, Raquel Duncan APRN    ondansetron (ZOFRAN) injection 4 mg, 4 mg,  Intravenous, Q6H, Raquel Duncan, APRN, 4 mg at 08/22/23 1128    pantoprazole (PROTONIX) EC tablet 40 mg, 40 mg, Oral, BID, Virginie Duncani PRUDENCIO, APRN, 40 mg at 08/22/23 1005    pregabalin (LYRICA) capsule 100 mg, 100 mg, Oral, QAM, Virginie Duncani D, APRN    pregabalin (LYRICA) capsule 200 mg, 200 mg, Oral, Nightly, Virginie Duncani PRUDENCIO, APRN, 200 mg at 08/22/23 0143    rosuvastatin (CRESTOR) tablet 40 mg, 40 mg, Oral, Nightly, Dakota, Raquel D, APRN, 40 mg at 08/22/23 0143    [COMPLETED] Insert Peripheral IV, , , Once **AND** sodium chloride 0.9 % flush 10 mL, 10 mL, Intravenous, PRN, Taraseltsilvia, Neelam Sharon, APRN    sodium chloride 0.9 % flush 10 mL, 10 mL, Intravenous, Q12H, Raquel Duncan, APRN, 10 mL at 08/21/23 2333    sodium chloride 0.9 % flush 10 mL, 10 mL, Intravenous, PRN, Virginie Duncani PRUDENCIO, APRN    sodium chloride 0.9 % infusion 40 mL, 40 mL, Intravenous, PRN, Raquel Duncan, APRN    tamsulosin (FLOMAX) 24 hr capsule 0.4 mg, 0.4 mg, Oral, Daily, Raquel Duncan, APRN, 0.4 mg at 08/22/23 1005    Review of Systems:   Review of Systems   Constitutional:  Positive for fatigue and fever. Negative for chills.   HENT:  Negative for rhinorrhea and sore throat.    Respiratory:  Negative for cough and shortness of breath.    Cardiovascular:  Positive for leg swelling. Negative for chest pain and palpitations.   Gastrointestinal:  Negative for diarrhea, nausea and vomiting.   Genitourinary:  Negative for flank pain and hematuria.   Musculoskeletal:  Positive for myalgias. Negative for arthralgias.   Skin:  Positive for color change and wound.   Neurological:  Positive for weakness and numbness. Negative for dizziness and headaches.   Psychiatric/Behavioral:  Negative for agitation and behavioral problems.        OBJECTIVE     Vitals:    08/22/23 0955   BP: 123/57   Pulse: 60   Resp: 18   Temp:    SpO2: 100%       PHYSICAL EXAM:   Physical Exam  Vitals and nursing note reviewed.   Constitutional:        General: He is awake.      Appearance: He is obese.      Comments: Body mass index is 38.61 kg/mý.    HENT:      Head: Normocephalic and atraumatic.   Eyes:      General: Lids are normal. Gaze aligned appropriately.   Cardiovascular:      Rate and Rhythm: Normal rate and regular rhythm.   Pulmonary:      Effort: Pulmonary effort is normal. No respiratory distress.   Abdominal:      General: Abdomen is protuberant. There is no distension.   Musculoskeletal:      Cervical back: Normal range of motion and neck supple.   Feet:      Right foot:      Skin integrity: Skin integrity normal. No ulcer.      Left foot:      Skin integrity: Ulcer and erythema present.      Comments: Left lateral foot wound measures 5.5 cm x 5.4 cm with small amount of slough and subcutaneous tissue present.  Small amount of green drainage present.  Irregular edges attached wound bed.  No undermining or tunneling present.  Periwound area is pink and blanchable.  Adhesive foam piece in place for offloading.  Skin:     General: Skin is warm and dry.      Findings: Wound present.   Neurological:      Mental Status: He is alert and oriented to person, place, and time.      Motor: Weakness present.   Psychiatric:         Attention and Perception: Attention normal.         Mood and Affect: Mood normal.         Speech: Speech normal.         Behavior: Behavior is cooperative.          Results Review:  Lab Results (last 48 hours)       Procedure Component Value Units Date/Time    POC Glucose Once [[id number]]  (Normal) Collected: 08/22/23 1140    Specimen: Blood Updated: 08/22/23 1153     Glucose 96 mg/dL      Comment: : [email or web address] Mahmood KristieMeter ID: [id number]       POC Glucose Once [[id number]]  (Normal) Collected: 08/22/23 1106    Specimen: Blood Updated: 08/22/23 1118     Glucose 79 mg/dL      Comment: : [id number] Levi Banks ID: [id number]       POC Glucose Once [[id number]]  (Abnormal) Collected: 08/22/23 0740    Specimen:  Blood Updated: 08/22/23 0751     Glucose 136 mg/dL      Comment: : [id number] Levi Banks ID: [id number]       Basic Metabolic Panel [[id number]]  (Abnormal) Collected: 08/22/23 0458    Specimen: Blood Updated: 08/22/23 0549     Glucose 174 mg/dL      BUN 23 mg/dL      Creatinine 1.90 mg/dL      Sodium 141 mmol/L      Potassium 3.7 mmol/L      Chloride 105 mmol/L      CO2 28.0 mmol/L      Calcium 8.9 mg/dL      BUN/Creatinine Ratio 12.1     Anion Gap 8.0 mmol/L      eGFR 38.2 mL/min/1.73     Narrative:      GFR Normal >60  Chronic Kidney Disease <60  Kidney Failure <15      Lipid Panel [[id number]] Collected: 08/22/23 0458    Specimen: Blood Updated: 08/22/23 0549     Total Cholesterol 146 mg/dL      Triglycerides 132 mg/dL      HDL Cholesterol 44 mg/dL      LDL Cholesterol  79 mg/dL      VLDL Cholesterol 23 mg/dL      LDL/HDL Ratio 1.72    Narrative:      Cholesterol Reference Ranges  (U.S. Department of Health and Human Services ATP III Classifications)    Desirable          <200 mg/dL  Borderline High    200-239 mg/dL  High Risk          >240 mg/dL      Triglyceride Reference Ranges  (U.S. Department of Health and Human Services ATP III Classifications)    Normal           <150 mg/dL  Borderline High  150-199 mg/dL  High             200-499 mg/dL  Very High        >500 mg/dL    HDL Reference Ranges  (U.S. Department of Health and Human Services ATP III Classifications)    Low     <40 mg/dl (major risk factor for CHD)  High    >60 mg/dl ('negative' risk factor for CHD)        LDL Reference Ranges  (U.S. Department of Health and Human Services ATP III Classifications)    Optimal          <100 mg/dL  Near Optimal     100-129 mg/dL  Borderline High  130-159 mg/dL  High             160-189 mg/dL  Very High        >189 mg/dL    CBC (No Diff) [[id number]]  (Abnormal) Collected: 08/22/23 0458    Specimen: Blood Updated: 08/22/23 0525     WBC 7.92 10*3/mm3      RBC 3.69 10*6/mm3      Hemoglobin 9.9 g/dL       Hematocrit 32.0 %      MCV 86.7 fL      MCH 26.8 pg      MCHC 30.9 g/dL      RDW 15.1 %      RDW-SD 47.7 fl      MPV 10.8 fL      Platelets 221 10*3/mm3     POC Glucose Once [[id number]]  (Abnormal) Collected: 08/22/23 0141    Specimen: Blood Updated: 08/22/23 0152     Glucose 188 mg/dL      Comment: : [id number] Darren LawrenMeter ID: [id number]       POC Glucose Once [[id number]]  (Abnormal) Collected: 08/22/23 0000    Specimen: Blood Updated: 08/22/23 0011     Glucose 246 mg/dL      Comment: : [id number] Amaya AbigailMeter ID: [id number]       POC Glucose Once [[id number]]  (Abnormal) Collected: 08/21/23 2230    Specimen: Blood Updated: 08/21/23 2241     Glucose 301 mg/dL      Comment: : [id number] Lazarevic NicoleMeter ID: [id number]       POC Glucose Once [[id number]]  (Abnormal) Collected: 08/21/23 2137    Specimen: Blood Updated: 08/21/23 2148     Glucose 365 mg/dL      Comment: : [id number] Lazarevic NicoleMeter ID: [id number]       High Sensitivity Troponin T 2Hr [[id number]]  (Abnormal) Collected: 08/21/23 2045    Specimen: Blood Updated: 08/21/23 2113     HS Troponin T 42 ng/L      Troponin T Delta -3 ng/L     Narrative:      High Sensitive Troponin T Reference Range:  <10.0 ng/L- Negative Female for AMI  <15.0 ng/L- Negative Male for AMI  >=10 - Abnormal Female indicating possible myocardial injury.  >=15 - Abnormal Male indicating possible myocardial injury.   Clinicians would have to utilize clinical acumen, EKG, Troponin, and serial changes to determine if it is an Acute Myocardial Infarction or myocardial injury due to an underlying chronic condition.         POC Glucose Once [[id number]]  (Abnormal) Collected: 08/21/23 2038    Specimen: Blood Updated: 08/21/23 2050     Glucose 408 mg/dL      Comment: : [id number] Leslie KristyMeter ID: [id number]       D-dimer, Quantitative [[id number]]  (Abnormal) Collected: 08/21/23 1643    Specimen: Blood Updated: 08/21/23 1936     D-Dimer,  "Quantitative 0.83 MCGFEU/mL     Narrative:      According to the assay 's published package insert, a normal (<0.50 MCGFEU/mL) D-dimer result in conjunction with a non-high clinical probability assessment, excludes deep vein thrombosis (DVT) and pulmonary embolism (PE) with high sensitivity.    D-dimer values increase with age and this can make VTE exclusion of an older population difficult. To address this, the [employer], based on best available evidence and recent guidelines, recommends that clinicians use age-adjusted D-dimer thresholds in patients greater than 50 years of age with: a) a low probability of PE who do not meet all Pulmonary Embolism Rule Out Criteria, or b) in those with intermediate probability of PE.   The formula for an age-adjusted D-dimer cut-off is \"age/100\".  For example, a 60 year old patient would have an age-adjusted cut-off of 0.60 MCGFEU/mL and an 80 year old 0.80 MCGFEU/mL.    Urinalysis, Microscopic Only - Urine, Clean Catch [[id number]]  (Abnormal) Collected: 08/21/23 1915    Specimen: Urine, Clean Catch Updated: 08/21/23 1930     RBC, UA 3-5 /HPF      WBC, UA None Seen /HPF      Bacteria, UA None Seen /HPF      Squamous Epithelial Cells, UA None Seen /HPF      Hyaline Casts, UA None Seen /LPF      Methodology Automated Microscopy    Urinalysis With Microscopic If Indicated (No Culture) - Urine, Clean Catch [[id number]]  (Abnormal) Collected: 08/21/23 1915    Specimen: Urine, Clean Catch Updated: 08/21/23 1930     Color, UA Yellow     Appearance, UA Clear     pH, UA 5.5     Specific Gravity, UA >=1.030     Glucose, UA >=1000 mg/dL (3+)     Ketones, UA Negative     Bilirubin, UA Negative     Blood, UA Trace     Protein, UA >=300 mg/dL (3+)     Leuk Esterase, UA Negative     Nitrite, UA Negative     Urobilinogen, UA 1.0 E.U./dL    Blood Gas, Arterial With Co-Ox [[id number]]  (Abnormal) Collected: 08/21/23 1921    Specimen: Arterial Blood Updated: 08/21/23 " 1922     Site Left Radial     Dae's Test Positive     pH, Arterial 7.418 pH units      pCO2, Arterial 39.4 mm Hg      pO2, Arterial 67.5 mm Hg      Comment: 84 Value below reference range        HCO3, Arterial 25.4 mmol/L      Base Excess, Arterial 0.8 mmol/L      O2 Saturation, Arterial 94.8 %      Hemoglobin, Blood Gas 10.7 g/dL      Comment: 84 Value below reference range        Hematocrit, Blood Gas 32.9 %      Comment: 84 Value below reference range        Oxyhemoglobin 92.5 %      Comment: 84 Value below reference range        Methemoglobin 0.50 %      Carboxyhemoglobin 1.9 %      A-a DO2 36.1 mmHg      Temperature 37.0 C      Sodium, Arterial 138 mmol/L      Potassium, Arterial 4.1 mmol/L      Barometric Pressure for Blood Gas 752 mmHg      Modality Room Air     Ventilator Mode NA     Collected by [id number]     Comment: Meter: [id number]     :  [id number]        pH, Temp Corrected 7.418 pH Units      pCO2, Temperature Corrected 39.4 mm Hg      pO2, Temperature Corrected 67.5 mm Hg     Acetone [[id number]]  (Normal) Collected: 08/21/23 1754    Specimen: Blood Updated: 08/21/23 1908     Acetone Negative    Comprehensive Metabolic Panel [[id number]]  (Abnormal) Collected: 08/21/23 1754    Specimen: Blood Updated: 08/21/23 1843     Glucose 520 mg/dL      BUN 27 mg/dL      Creatinine 2.11 mg/dL      Sodium 136 mmol/L      Potassium 4.4 mmol/L      Chloride 100 mmol/L      CO2 23.0 mmol/L      Calcium 9.3 mg/dL      Total Protein 7.2 g/dL      Albumin 4.2 g/dL      ALT (SGPT) 11 U/L      AST (SGOT) 11 U/L      Alkaline Phosphatase 85 U/L      Total Bilirubin 0.4 mg/dL      Globulin 3.0 gm/dL      A/G Ratio 1.4 g/dL      BUN/Creatinine Ratio 12.8     Anion Gap 13.0 mmol/L      eGFR 33.7 mL/min/1.73     Narrative:      GFR Normal >60  Chronic Kidney Disease <60  Kidney Failure <15      BNP [[id number]]  (Abnormal) Collected: 08/21/23 1754    Specimen: Blood Updated: 08/21/23 1821     proBNP 2,878.0 pg/mL      Narrative:      Among patients with dyspnea, NT-proBNP is highly sensitive for the detection of acute congestive heart failure. In addition NT-proBNP of <300 pg/ml effectively rules out acute congestive heart failure with 99% negative predictive value.      High Sensitivity Troponin T [[id number]]  (Abnormal) Collected: 08/21/23 1754    Specimen: Blood Updated: 08/21/23 1820     HS Troponin T 45 ng/L     Narrative:      High Sensitive Troponin T Reference Range:  <10.0 ng/L- Negative Female for AMI  <15.0 ng/L- Negative Male for AMI  >=10 - Abnormal Female indicating possible myocardial injury.  >=15 - Abnormal Male indicating possible myocardial injury.   Clinicians would have to utilize clinical acumen, EKG, Troponin, and serial changes to determine if it is an Acute Myocardial Infarction or myocardial injury due to an underlying chronic condition.         COVID-19 and FLU A/B PCR - Swab, Nasopharynx [[id number]]  (Normal) Collected: 08/21/23 1645    Specimen: Swab from Nasopharynx Updated: 08/21/23 1731     COVID19 Not Detected     Influenza A PCR Not Detected     Influenza B PCR Not Detected    Narrative:      Fact sheet for providers: https://www.fda.gov/media/942535/download    Fact sheet for patients: https://www.fda.gov/media/512997/download    Test performed by PCR.    aPTT [[id number]]  (Normal) Collected: 08/21/23 1643    Specimen: Blood Updated: 08/21/23 1700     PTT 27.8 seconds     Protime-INR [[id number]]  (Normal) Collected: 08/21/23 1643    Specimen: Blood Updated: 08/21/23 1700     Protime 13.0 Seconds      INR 0.97    CBC & Differential [[id number]]  (Abnormal) Collected: 08/21/23 1643    Specimen: Blood Updated: 08/21/23 1653    Narrative:      The following orders were created for panel order CBC & Differential.  Procedure                               Abnormality         Status                     ---------                               -----------         ------                     CBC Auto  Differential[[id number]]        Abnormal            Final result                 Please view results for these tests on the individual orders.    CBC Auto Differential [[id number]]  (Abnormal) Collected: 08/21/23 1643    Specimen: Blood Updated: 08/21/23 1653     WBC 9.99 10*3/mm3      RBC 3.80 10*6/mm3      Hemoglobin 10.4 g/dL      Hematocrit 32.9 %      MCV 86.6 fL      MCH 27.4 pg      MCHC 31.6 g/dL      RDW 15.3 %      RDW-SD 47.8 fl      MPV 11.8 fL      Platelets 254 10*3/mm3      Neutrophil % 79.0 %      Lymphocyte % 10.4 %      Monocyte % 7.8 %      Eosinophil % 1.8 %      Basophil % 0.5 %      Immature Grans % 0.5 %      Neutrophils, Absolute 7.89 10*3/mm3      Lymphocytes, Absolute 1.04 10*3/mm3      Monocytes, Absolute 0.78 10*3/mm3      Eosinophils, Absolute 0.18 10*3/mm3      Basophils, Absolute 0.05 10*3/mm3      Immature Grans, Absolute 0.05 10*3/mm3      nRBC 0.0 /100 WBC           Imaging Results (Last 72 Hours)       Procedure Component Value Units Date/Time    CT Angiogram Chest [[id number]] Collected: 08/21/23 2117     Updated: 08/21/23 2124    Narrative:      EXAMINATION: CT ANGIOGRAM CHEST-      8/21/2023 8:47 PM CDT     HISTORY: Syncope. Loss of consciousness. Chest pain.     In order to have a CT radiation dose as low as reasonably achievable  Automated Exposure Control was utilized for adjustment of the mA and/or  KV according to patient size.     DLP in mGycm= 263.     CT angiogram chest with IV contrast.  CT angiography protocol.   CT imaging with bolus IV contrast injection.   Under concurrent supervision axial, sagittal, coronal,  three-dimensional, and MIP data sets were constructed on an independent  work station.     Comparison is made with 01/19/2023.     Detail is affected by patient size and beam attenuation.     Cardiomegaly.  CABG changes.     Normal size thoracic aorta.  Limited visualization of small distal arterial branches though the  appearance of the pulmonary arteries is  unchanged compared with 7 months  ago.  No convincing evidence of pulmonary embolus.     The lung show mild patchy atelectasis.     Summary:  1. Stable chest CT appearance compared with 7 months ago.  2. No convincing evidence of pulmonary embolus.                                   This report was finalized on 08/21/2023 21:21 by Dr. Gomez Mcgill MD.    CT Head Without Contrast [[id number]] Collected: 08/21/23 2116     Updated: 08/21/23 2120    Narrative:      EXAMINATION: CT HEAD WO CONTRAST-      8/21/2023 8:47 PM CDT     HISTORY: Syncope. Loss of consciousness. Chest pain.     In order to have a CT radiation dose as low as reasonably achievable  Automated Exposure Control was utilized for adjustment of the mA and/or  KV according to patient size.     DLP in mGycm= 774.     Comparison is made with 03/04/2022.     Axial, sagittal, and coronal noncontrast CT imaging of the head.     The visualized paranasal sinuses are clear.     The brain and ventricles have an age appropriate appearance.      There is no hemorrhage or mass-effect.   No acute infarction is seen.     No calvarial abnormality.       Impression:      1. No acute intracranial abnormality is seen.                                         This report was finalized on 08/21/2023 21:17 by Dr. Gomez Mcgill MD.    XR Chest 1 View [[id number]] Collected: 08/21/23 1658     Updated: 08/21/23 1702    Narrative:      EXAMINATION: XR CHEST 1 VW-     8/21/2023 4:45 PM CDT     HISTORY: chest pain     1 view chest x-ray.     Comparison is made with 06/14/2023.     Magnified heart size.  Heart size is stable.  CABG changes.  Cervical spine fusion hardware.     Mild chronic interstitial lung disease with a few granulomas.     No pneumonia, pneumothorax, or congestive failure.     Summary:  1. Stable chronic lung changes.  2. No acute abnormality is seen.  This report was finalized on 08/21/2023 16:58 by Dr. Gomez Mcgill MD.               ASSESSMENT/PLAN        Examination and evaluation of wound(s) was performed.    An excisional tissue debridement was completed of the wound located on the left foot with a total area of 29.7cmý. The following instruments were used: curette. Pain control was achieved using lidocaine 4% topical solution. Material removed includes slough, subcutaneous tissue and biofilm.  Tissue collected for culture. A minimal amount of bleeding was controlled with pressure. The procedure was tolerated well with a pain level of 0 prior to procedure and a pain level of 0 following the procedure. Post debridement measurements: 5.5cm x 5.4cm x 0.1cm. Tissue exposed post debridement is subcutaneous tissue.     DIAGNOSIS:   Diabetic ulcer of left foot with fat layer exposed  Type 2 diabetes mellitus with foot ulcer  Diabetic neuropathy  Edema of BLE       PLAN:   Patient presents with diabetic ulcer of left foot that is followed by [facility].  Orders placed for wound care and with use of Opticell AG.        Start     Ordered    08/22/23 1325  Wound Care  Daily      Question Answer Comment   Wound Locations Left lateral plantar midfoot    Wound Care Instructions Clean with NS.  Apply Opticell Ag and wrap with Kerlix.    Cleanse Normal Saline    Intervention Other    Other Opticell Ag    Dressing: Gauze Roll        08/22/23 1324     Tissue / Bone Culture - Tissue, Foot, Left [[id number]] (Order [id number])              Discussed findings and treatment plan including risks, benefits, and treatment options with patient in detail. Patient agreed with treatment plan.      This document has been electronically signed by SUSAN Saab on 8/22/2023 13:23 CDT

## 2023-08-23 ENCOUNTER — READMISSION MANAGEMENT (OUTPATIENT)
Dept: CALL CENTER | Facility: HOSPITAL | Age: 68
End: 2023-08-23
Payer: COMMERCIAL

## 2023-08-23 VITALS
HEIGHT: 77 IN | HEART RATE: 65 BPM | RESPIRATION RATE: 18 BRPM | BODY MASS INDEX: 37.19 KG/M2 | WEIGHT: 315 LBS | TEMPERATURE: 98 F | OXYGEN SATURATION: 95 % | SYSTOLIC BLOOD PRESSURE: 134 MMHG | DIASTOLIC BLOOD PRESSURE: 62 MMHG

## 2023-08-23 LAB
GLUCOSE BLDC GLUCOMTR-MCNC: 168 MG/DL (ref 70–130)
GLUCOSE BLDC GLUCOMTR-MCNC: 187 MG/DL (ref 70–130)
GLUCOSE BLDC GLUCOMTR-MCNC: 204 MG/DL (ref 70–130)
QT INTERVAL: 370 MS
QTC INTERVAL: 442 MS

## 2023-08-23 PROCEDURE — 82948 REAGENT STRIP/BLOOD GLUCOSE: CPT

## 2023-08-23 PROCEDURE — G0378 HOSPITAL OBSERVATION PER HR: HCPCS

## 2023-08-23 PROCEDURE — 63710000001 INSULIN LISPRO (HUMAN) PER 5 UNITS: Performed by: NURSE PRACTITIONER

## 2023-08-23 RX ORDER — BUMETANIDE 2 MG/1
2 TABLET ORAL DAILY
Start: 2023-08-23

## 2023-08-23 RX ORDER — BISACODYL 5 MG/1
5 TABLET, DELAYED RELEASE ORAL DAILY PRN
Status: DISCONTINUED | OUTPATIENT
Start: 2023-08-23 | End: 2023-08-23 | Stop reason: HOSPADM

## 2023-08-23 RX ORDER — POLYETHYLENE GLYCOL 3350 17 G/17G
17 POWDER, FOR SOLUTION ORAL DAILY
Start: 2023-08-23

## 2023-08-23 RX ORDER — BISACODYL 10 MG
10 SUPPOSITORY, RECTAL RECTAL DAILY PRN
Status: DISCONTINUED | OUTPATIENT
Start: 2023-08-23 | End: 2023-08-23 | Stop reason: HOSPADM

## 2023-08-23 RX ORDER — INSULIN HUMAN 500 [IU]/ML
120 INJECTION, SOLUTION SUBCUTANEOUS
Start: 2023-08-23 | End: 2023-08-29

## 2023-08-23 RX ORDER — AMOXICILLIN 250 MG
1 CAPSULE ORAL NIGHTLY
Start: 2023-08-23

## 2023-08-23 RX ORDER — ONDANSETRON 2 MG/ML
4 INJECTION INTRAMUSCULAR; INTRAVENOUS EVERY 6 HOURS PRN
Status: DISCONTINUED | OUTPATIENT
Start: 2023-08-23 | End: 2023-08-23 | Stop reason: HOSPADM

## 2023-08-23 RX ORDER — POLYETHYLENE GLYCOL 3350 17 G/17G
17 POWDER, FOR SOLUTION ORAL DAILY
Status: DISCONTINUED | OUTPATIENT
Start: 2023-08-23 | End: 2023-08-23 | Stop reason: HOSPADM

## 2023-08-23 RX ORDER — AMOXICILLIN 250 MG
1 CAPSULE ORAL NIGHTLY
Status: DISCONTINUED | OUTPATIENT
Start: 2023-08-23 | End: 2023-08-23 | Stop reason: HOSPADM

## 2023-08-23 RX ADMIN — PREGABALIN 100 MG: 100 CAPSULE ORAL at 08:49

## 2023-08-23 RX ADMIN — ASPIRIN 81 MG: 81 TABLET, COATED ORAL at 08:50

## 2023-08-23 RX ADMIN — CARVEDILOL 6.25 MG: 6.25 TABLET, FILM COATED ORAL at 08:50

## 2023-08-23 RX ADMIN — INSULIN LISPRO 5 UNITS: 100 INJECTION, SOLUTION INTRAVENOUS; SUBCUTANEOUS at 17:24

## 2023-08-23 RX ADMIN — DONEPEZIL HYDROCHLORIDE 10 MG: 10 TABLET, FILM COATED ORAL at 08:50

## 2023-08-23 RX ADMIN — TAMSULOSIN HYDROCHLORIDE 0.4 MG: 0.4 CAPSULE ORAL at 08:50

## 2023-08-23 RX ADMIN — INSULIN LISPRO 3 UNITS: 100 INJECTION, SOLUTION INTRAVENOUS; SUBCUTANEOUS at 08:50

## 2023-08-23 RX ADMIN — EMPAGLIFLOZIN 25 MG: 25 TABLET, FILM COATED ORAL at 08:51

## 2023-08-23 RX ADMIN — CALCITRIOL 0.5 MCG: 0.25 CAPSULE ORAL at 08:49

## 2023-08-23 RX ADMIN — PANTOPRAZOLE SODIUM 40 MG: 40 TABLET, DELAYED RELEASE ORAL at 08:49

## 2023-08-23 RX ADMIN — CLONIDINE HYDROCHLORIDE 0.1 MG: 0.1 TABLET ORAL at 08:50

## 2023-08-23 NOTE — PLAN OF CARE
Goal Outcome Evaluation:  Plan of Care Reviewed With: patient        Progress: no change  Outcome Evaluation: VSS. Pt is A&Ox4. No changes in NVs. Up w/ assist x1. C/o L foot pain, relief w/ prns, cold packs applied, repositioned w/ pillow support, extremity elevated. Tele and SCDs in place. Resting comfortably. Safety and isolation precautions maintained.

## 2023-08-23 NOTE — PROGRESS NOTES
"  Neurology Progress Note      Chief Complaint:  confusion    Subjective     Subjective:    Confusion improved.  He still believes that his vision is \"shakey.\"  The wall and floor appear to be moving.  He denies hallucinations.  His wife remains concerned.  She reminds me today that over the past several weeks since his last hospitalization he has been slow to answer questions.  He has some difficulties with short-term memory as well.  She does believe that his confusion has improved greatly since admission.  Medications:  Current Facility-Administered Medications   Medication Dose Route Frequency Provider Last Rate Last Dose   • acetaminophen (TYLENOL) tablet 650 mg  650 mg Oral Q4H PRN Alejandrina Barnett DO       • aspirin EC tablet 81 mg  81 mg Oral Daily Alejandrina Barnett DO   81 mg at 03/06/19 0820   • bumetanide (BUMEX) tablet 2 mg  2 mg Oral BID PRN Telly Rodriguez MD       • colchicine tablet 0.6 mg  0.6 mg Oral BID PRN Alejandrina Barnett DO       • dextrose (D50W) 25 g/ 50mL Intravenous Solution 25 g  25 g Intravenous Q15 Min PRN Alejandrina Barnett DO       • dextrose (GLUTOSE) oral gel 15 g  15 g Oral Q15 Min PRN Alejandrina Barnett DO       • DULoxetine (CYMBALTA) DR capsule 60 mg  60 mg Oral Daily Alejandrina Barnett DO   60 mg at 03/06/19 0820   • glucagon (human recombinant) (GLUCAGEN DIAGNOSTIC) injection 1 mg  1 mg Subcutaneous PRN Alejandrina Barnett DO       • insulin detemir (LEVEMIR) injection 20 Units  20 Units Subcutaneous Q12H Telly Rodriguez MD   20 Units at 03/06/19 0810   • insulin lispro (humaLOG) injection 0-24 Units  0-24 Units Subcutaneous 4x Daily With Meals & Nightly Alejandrina Barnett DO   8 Units at 03/06/19 0810   • lactulose solution 10 g  10 g Oral TID Alejandrina Barnett DO   10 g at 03/06/19 0820   • losartan (COZAAR) tablet 50 mg  50 mg Oral Q24H Telly Rodriguez MD   50 mg at 03/06/19 0820   • methylcellulose (Laxative) (CITRUCEL) tablet 500 mg  1 tablet Oral " BID Alejandrina Barnett DO   500 mg at 03/06/19 0819   • metoprolol succinate XL (TOPROL-XL) 24 hr tablet 25 mg  25 mg Oral Daily PRN Telly Rodriguez MD       • midodrine (PROAMATINE) tablet 10 mg  10 mg Oral TID Alejandrina Barnett DO   10 mg at 03/06/19 0819   • ondansetron (ZOFRAN) injection 4 mg  4 mg Intravenous Q6H PRN Alejandrina Barnett DO   4 mg at 03/05/19 1916   • ondansetron ODT (ZOFRAN-ODT) disintegrating tablet 4 mg  4 mg Oral 4x Daily PRN Alejandrina Barnett DO       • oxyCODONE-acetaminophen (PERCOCET) 5-325 MG per tablet 1 tablet  1 tablet Oral Q6H PRN Telly Rodriguez MD   1 tablet at 03/06/19 0811   • pantoprazole (PROTONIX) EC tablet 40 mg  40 mg Oral Daily Alejandrina Barnett DO   40 mg at 03/06/19 0822   • rosuvastatin (CRESTOR) tablet 40 mg  40 mg Oral Daily Alejandrina Barnett DO   40 mg at 03/06/19 0821   • sodium chloride 0.9 % flush 3 mL  3 mL Intravenous Q12H Alejandrina Barnett DO   3 mL at 03/06/19 0820   • sodium chloride 0.9 % flush 3-10 mL  3-10 mL Intravenous PRN Alejandrina Barnett DO   10 mL at 03/05/19 0618       Review of Systems:   -A 14 point review of systems is completed and is negative except for previous confusion      Objective      Vital Signs  Temp:  [97.9 °F (36.6 °C)-98.1 °F (36.7 °C)] 97.9 °F (36.6 °C)  Heart Rate:  [70-90] 80  Resp:  [16-20] 18  BP: ()/(49-63) 120/63    Physical Exam:    General Exam:  Head:  Normal cephalic, atraumatic  HEENT:  Neck supple  Fundoscopic Exam:  No signs of disc edema  CVS:  Regular rate and rhythm.  No murmurs  Carotid Examination:  No bruits  Lungs:  Clear to auscultation  Abdomen:  Non-tender, Non-distended  Extremities:  No signs of peripheral edema  Skin:  No rashes    Neurologic Exam:    Mental Status:    -Awake, Alert, Oriented X 3  -No word finding difficulties  -No aphasia  -No dysarthria  -Follows simple and complex commands    CN II:  Visual fields full.  Pupils equally reactive to light  CN III, IV, VI:   Extraocular Muscles full with no signs of nystagmus  CN V:  Facial sensory is symmetric with no asymmetries.  CN VII:  Facial motor symmetric  CN VIII:  Gross hearing intact bilaterally  CN IX:  Palate elevates symmetrically  CN X:  Palate elevates symmetrically  CN XI:  Shoulder shrug symmetric  CN XII:  Tongue is midline on protrusion    Motor: (strength out of 5:  1= minimal movement, 2 = movement in plane of gravity, 3 = movement against gravity, 4 = movement against some resistance, 5 = full strength)    -Right Upper Ext: Proximal: 5 Distal: 5  -Left Upper Ext: Proximal: 5 Distal: 5    -Right Lower Ext: Proximal: 5 Distal: 5  -Left Lower Ext: Proximal: 5 Distal: 5    DTR:  -Right   Bicep: 2+ Tricep: 2+ Brachioradialis: 2+   Patella: 2+ Ankle: 2+ Neg Babinski  -Left   Bicep: 2+ Tricep: 2+ Brachioradialis: 2+   Patella: 2+ Ankle: 2+ Neg Babinski    Sensory:  -Intact to light touch, pinprick, temperature, pain, and proprioception    Coordination:  -Finger to nose intact  -Heel to shin intact  -No ataxia    Gait  -No signs of ataxia  -ambulates unassisted       Results Review:    I reviewed the patient's new clinical results.    Results from last 7 days   Lab Units 03/06/19  0500 03/05/19  0544 03/04/19  0531   WBC 10*3/mm3 9.18 7.60 7.19   HEMOGLOBIN g/dL 12.6* 12.6* 11.8*   HEMATOCRIT % 36.8* 37.2* 35.5*   PLATELETS 10*3/mm3 237 237 227        Results from last 7 days   Lab Units 03/06/19  0500 03/05/19  0544 03/04/19  0531   SODIUM mmol/L 135 135 136   POTASSIUM mmol/L 4.1 4.1 4.5   CHLORIDE mmol/L 101 97* 98   CO2 mmol/L 27.0 31.0 29.0   BUN mg/dL 19 20 17   CREATININE mg/dL 1.20 1.29 1.48*   CALCIUM mg/dL 8.7 8.6 9.2   BILIRUBIN mg/dL 0.7 0.7 0.8   ALK PHOS U/L 68 65 61   ALT (SGPT) U/L 22 22 25   AST (SGOT) U/L 17 24 30   GLUCOSE mg/dL 164* 266* 279*        Lab Results   Component Value Date    PHOS 4.9 (H) 03/04/2019    MG 1.7 03/04/2019    PROTIME 13.1 03/03/2019    INR 0.96 03/03/2019     No components  found for: POCGLUC  No components found for: A1C  Lab Results   Component Value Date    HDL 29 (L) 03/05/2019    LDL 89 03/05/2019     No components found for: B12  Lab Results   Component Value Date    TSH 0.751 03/04/2019     EEG reviewed by me and is unremarkable  CT Head without contrast reviewed by me:  No acute findings.      Assessment/Plan     Hospital Problem List      CAD (coronary artery disease)    Diabetes mellitus (CMS/[facility])    Chronic kidney disease, stage III (moderate) (CMS/[facility])    Abdominal pain    Altered mental status    Impression:  Metabolic encephalopathy likely from hyperglycemia  Acute medical delirium  Autonomic neuropathy    Plan:  · EEG - normal  · B12 - normal  · Folate - normal  · Strive for normal glycemic levels  · May consider [facility] as an outpatient              Steven Aviles MD  03/06/19  9:50 AM     Odomzo Counseling- I discussed with the patient the risks of Odomzo including but not limited to nausea, vomiting, diarrhea, constipation, weight loss, changes in the sense of taste, decreased appetite, muscle spasms, and hair loss.  The patient verbalized understanding of the proper use and possible adverse effects of Odomzo.  All of the patient's questions and concerns were addressed.

## 2023-08-23 NOTE — CONSULTS
"Pt is on Trulicity as home med.  Encouraged wife to ask PCP about continuing the drug  or wait until nausea is better.  They feel it started with a \"stomach virus\" and wife states she had it also.  Pt is suppose to increase his dose this Friday.  Again encouraged wife to check or have pt check with PCP about increasing his dose or holding dose till nausea is better.  "

## 2023-08-23 NOTE — NURSING NOTE
Discharge instructions and paperwork given to patient at bedside. Patient verbalized understanding. Pt wife will transport pt home later this afternoon after she gets off from work.

## 2023-08-23 NOTE — PLAN OF CARE
Goal Outcome Evaluation:  Plan of Care Reviewed With: patient        Progress: improving  Outcome Evaluation: cleared for discharge

## 2023-08-23 NOTE — DISCHARGE SUMMARY
[facility] Medicine Services  DISCHARGE SUMMARY       Date of Admission: 8/21/2023  Date of Discharge:  8/23/2023  Primary Care Physician: Del Shetty MD    Presenting Problem/History of Present Illness:  Syncope and collapse    Final Discharge Diagnoses:  Active Hospital Problems    Diagnosis     **Syncope and collapse     Syncope     Chest pain     Nausea in adult     Poorly-controlled hypertension     Stage 3b chronic kidney disease     Class 3 severe obesity due to excess calories with body mass index (BMI) of 40.0 to 44.9 in adult     Anemia due to chronic kidney disease     Type 2 diabetes mellitus with hyperglycemia, with long-term current use of insulin        Consults: None.    Procedures Performed: None.    Pertinent Test Results:   Results for orders placed during the hospital encounter of 08/21/23    Adult Transthoracic Echo Complete With Contrast if Necessary Per Protocol (With Agitated Saline)    Interpretation Summary    Technically difficult study.    Left ventricular systolic function is low normal. Left ventricular ejection fraction appears to be 51 - 55%.    Left ventricular diastolic dysfunction is noted.    Normal right ventricular cavity size and systolic function noted.    There is no significant (greater than mild) valvular dysfunction.    Compared to study from 1/21/2023, there are no significant changes.      Imaging Results (All)       Procedure Component Value Units Date/Time    CT Angiogram Chest [[id number]] Collected: 08/21/23 2117     Updated: 08/21/23 2124    Narrative:      EXAMINATION: CT ANGIOGRAM CHEST-      8/21/2023 8:47 PM CDT     HISTORY: Syncope. Loss of consciousness. Chest pain.     In order to have a CT radiation dose as low as reasonably achievable  Automated Exposure Control was utilized for adjustment of the mA and/or  KV according to patient size.     DLP in mGycm= 263.     CT angiogram chest with IV contrast.  CT  angiography protocol.   CT imaging with bolus IV contrast injection.   Under concurrent supervision axial, sagittal, coronal,  three-dimensional, and MIP data sets were constructed on an independent  work station.     Comparison is made with 01/19/2023.     Detail is affected by patient size and beam attenuation.     Cardiomegaly.  CABG changes.     Normal size thoracic aorta.  Limited visualization of small distal arterial branches though the  appearance of the pulmonary arteries is unchanged compared with 7 months  ago.  No convincing evidence of pulmonary embolus.     The lung show mild patchy atelectasis.     Summary:  1. Stable chest CT appearance compared with 7 months ago.  2. No convincing evidence of pulmonary embolus.                                   This report was finalized on 08/21/2023 21:21 by Dr. Gomez Mcgill MD.    CT Head Without Contrast [[id number]] Collected: 08/21/23 2116     Updated: 08/21/23 2120    Narrative:      EXAMINATION: CT HEAD WO CONTRAST-      8/21/2023 8:47 PM CDT     HISTORY: Syncope. Loss of consciousness. Chest pain.     In order to have a CT radiation dose as low as reasonably achievable  Automated Exposure Control was utilized for adjustment of the mA and/or  KV according to patient size.     DLP in mGycm= 774.     Comparison is made with 03/04/2022.     Axial, sagittal, and coronal noncontrast CT imaging of the head.     The visualized paranasal sinuses are clear.     The brain and ventricles have an age appropriate appearance.      There is no hemorrhage or mass-effect.   No acute infarction is seen.     No calvarial abnormality.       Impression:      1. No acute intracranial abnormality is seen.                                         This report was finalized on 08/21/2023 21:17 by Dr. Gomez Mcgill MD.    XR Chest 1 View [[id number]] Collected: 08/21/23 1658     Updated: 08/21/23 1702    Narrative:      EXAMINATION: XR CHEST 1 VW-     8/21/2023 4:45 PM CDT     HISTORY:  chest pain     1 view chest x-ray.     Comparison is made with 06/14/2023.     Magnified heart size.  Heart size is stable.  CABG changes.  Cervical spine fusion hardware.     Mild chronic interstitial lung disease with a few granulomas.     No pneumonia, pneumothorax, or congestive failure.     Summary:  1. Stable chronic lung changes.  2. No acute abnormality is seen.  This report was finalized on 08/21/2023 16:58 by Dr. Gomez Mcgill MD.          LAB RESULTS:      Lab 08/22/23 0458 08/21/23 1643 08/16/23  1257   WBC 7.92 9.99 8.50   HEMOGLOBIN 9.9* 10.4* 11.1*   HEMATOCRIT 32.0* 32.9* 34.7*   PLATELETS 221 254 266   NEUTROS ABS  --  7.89* 6.20   IMMATURE GRANS (ABS)  --  0.05  --    LYMPHS ABS  --  1.04 1.40   MONOS ABS  --  0.78  --    EOS ABS  --  0.18  --    MCV 86.7 86.6 86.5   PROTIME  --  13.0  --    APTT  --  27.8  --    D DIMER QUANT  --  0.83*  --          Lab 08/22/23 0458 08/21/23 1921 08/21/23 1754 08/16/23  1257   SODIUM 141  --  136 139   SODIUM, ARTERIAL  --  138  --   --    POTASSIUM 3.7  --  4.4 4.4   CHLORIDE 105  --  100 103   CO2 28.0  --  23.0 29.0   ANION GAP 8.0  --  13.0 7.0   BUN 23  --  27* 27*   CREATININE 1.90*  --  2.11* 1.67*   EGFR 38.2*  --  33.7* 44.6*   GLUCOSE 174*  --  520* 210*   CALCIUM 8.9  --  9.3 8.7   HEMOGLOBIN A1C  --   --   --  10.1*         Lab 08/21/23 1754 08/16/23  1257   TOTAL PROTEIN 7.2 7.0   ALBUMIN 4.2 3.7   GLOBULIN 3.0 3.3   ALT (SGPT) 11 20   AST (SGOT) 11 25   BILIRUBIN 0.4 0.6   ALK PHOS 85 84   AMYLASE  --  132*   LIPASE  --  190         Lab 08/21/23  2045 08/21/23  1754 08/21/23  1643   PROBNP  --  2,878.0*  --    HSTROP T 42* 45*  --    PROTIME  --   --  13.0   INR  --   --  0.97         Lab 08/22/23  0458   CHOLESTEROL 146   LDL CHOL 79   HDL CHOL 44   TRIGLYCERIDES 132             Lab 08/21/23  1921   PH, ARTERIAL 7.418   PCO2, ARTERIAL 39.4   PO2 ART 67.5*   O2 SATURATION ART 94.8   HCO3 ART 25.4   BASE EXCESS ART 0.8   CARBOXYHEMOGLOBIN 1.9      Brief Urine Lab Results  (Last result in the past 365 days)        Color   Clarity   Blood   Leuk Est   Nitrite   Protein   CREAT   Urine HCG        08/21/23 1915 Yellow   Clear   Trace   Negative   Negative   >=300 mg/dL (3+)                 Microbiology Results (last 10 days)       Procedure Component Value - Date/Time    Tissue / Bone Culture - Tissue, Foot, Left [[id number]] Collected: 08/22/23 1351    Lab Status: Preliminary result Specimen: Tissue from Foot, Left Updated: 08/22/23 1508     Gram Stain Few (2+) WBCs seen      No organisms seen    COVID-19 and FLU A/B PCR - Swab, Nasopharynx [[id number]]  (Normal) Collected: 08/21/23 1645    Lab Status: Final result Specimen: Swab from Nasopharynx Updated: 08/21/23 1731     COVID19 Not Detected     Influenza A PCR Not Detected     Influenza B PCR Not Detected    Narrative:      Fact sheet for providers: https://www.fda.gov/media/414473/download    Fact sheet for patients: https://www.fda.gov/media/115458/download    Test performed by PCR.            Hospital Course:   Mr. Luong is a 68-year-old male who presented to [facility] on 8/21 with syncope and collapse.  He was at [place] on 8/21 when he had an episode of profuse sweating and felt as if he needed to have a bowel movement.  He states that he woke up on the floor after having a bowel movement.  He states he got up and walked to his car and passed out again and awakened and vomited. Wife states he has been nauseated for the past 2-3 weeks, awaiting evaluation by GI. He had an upper GI endoscopy in February 2021 by Dr. Hannah -revealed esophagitis. He takes protonix on outpatient basis.  Patient has a 2-week history of high glucose.  He has had insulin adjustment per Dr. Shetty. He is medically noncompliant and has had several hospitalizations for volume overload.  In the ED, glucose 500.  CT head showed no acute findings.  CT angiogram chest showed no PE.  Stable chest appearance compared with  "CT 7 months ago. COVID/Flu negative.     Orthostatic negative.  He has been up ambulating without difficulty. Syncope and collapse likely vasovagal.     Transthoracic echocardiogram repeated yesterday.  No significant change when compared to previous echo in January 2023.  Results for orders placed during the hospital encounter of 08/21/23    Adult Transthoracic Echo Complete With Contrast if Necessary Per Protocol (With Agitated Saline)    Interpretation Summary    Technically difficult study.    Left ventricular systolic function is low normal. Left ventricular ejection fraction appears to be 51 - 55%.    Left ventricular diastolic dysfunction is noted.    Normal right ventricular cavity size and systolic function noted.    There is no significant (greater than mild) valvular dysfunction.    Compared to study from 1/21/2023, there are no significant changes.    Uncontrolled type 2 diabetes with hyperglycemia.  Blood glucose 520 on admission.  A1c 10.1 on 8/16/2023.  A1c dating back to December 2021 9-10.  States that Dr. Shetty recently increased his insulin to 120 units 3 times a day in addition to Jardiance and Trulicity.  Stressed importance of diet and exercise.     History of coronary artery disease status post previous coronary artery bypass grafting.  Patient has a chronic chest pain that has been well-documented in the past.  Follows with Dr. Garay as outpatient last seen 8/3/2023. Continue aspirin, coreg, statin.      Has had \"sweating episodes\" per wife.  Afebrile.  Normal WBC.  COVID/flu negative.  CT angiogram showed no pneumonia.  On room air. UA not consistent with UTI.  No signs of infection.     Known CKD stage 3b followed by Dr. Lomas.   Creatinine stable at baseline.     Antiemetics as needed.  Protonix.  He had an upper GI endoscopy in February 2021 by Dr. Hannah -revealed esophagitis. Has an upcoming appointment with GI as outpatient.     Wound care consult.  Excisional tissue debridement was " "completed by wound care APRN on 8/23.  He is to continue to follow with [facility] wound care clinic at time of discharge.    He has reached maximum benefit of hospitalization.  He is medically stable and appropriate for discharge today.    Physical Exam on Discharge:  /61 (BP Location: Left arm, Patient Position: Lying)   Pulse 63   Temp 98 øF (36.7 øC) (Oral)   Resp 18   Ht 195.6 cm (77\")   Wt (!) 148 kg (325 lb 9.6 oz)   SpO2 94%   BMI 38.61 kg/mý   Physical Exam  Vitals reviewed.   Constitutional:       General: He is not in acute distress.     Appearance: He is obese. He is not toxic-appearing.      Comments: Lying in bed.  No acute distress.  On room air.  Discussed with his nurse scot.   HENT:      Head: Normocephalic and atraumatic.      Mouth/Throat:      Mouth: Mucous membranes are moist.      Pharynx: Oropharynx is clear.   Eyes:      Extraocular Movements: Extraocular movements intact.      Conjunctiva/sclera: Conjunctivae normal.      Pupils: Pupils are equal, round, and reactive to light.   Cardiovascular:      Rate and Rhythm: Normal rate and regular rhythm.      Pulses: Normal pulses.   Pulmonary:      Effort: Pulmonary effort is normal.      Breath sounds: Normal breath sounds.   Abdominal:      General: Bowel sounds are normal.     Palpations: Abdomen is soft.      Tenderness: There is no abdominal tenderness.   Musculoskeletal:         General: No swelling or tenderness. Normal range of motion.      Cervical back: Normal range of motion and neck supple. No muscular tenderness.      Right lower leg: Edema present.      Left lower leg: Edema present.   Skin:     General: Skin is warm and dry.      Findings: No erythema or rash.   Neurological:      General: No focal deficit present.      Mental Status: He is alert and oriented to person, place, and time.      Cranial Nerves: No cranial nerve deficit.      Motor: No weakness.   Psychiatric:         Mood and Affect: Mood normal.         " Behavior: Behavior normal.     Condition on Discharge: medically stable.    Discharge Disposition:  Home or Self Care    Discharge Medications:     Discharge Medications        New Medications        Instructions Start Date   polyethylene glycol 17 g packet  Commonly known as: MIRALAX   17 g, Oral, Daily, Obtain OTC      sennosides-docusate 8.6-50 MG per tablet  Commonly known as: PERICOLACE   1 tablet, Oral, Nightly, Obtain OTC             Changes to Medications        Instructions Start Date   bumetanide 2 MG tablet  Commonly known as: BUMEX  What changed:   how much to take  how to take this  when to take this  additional instructions   2 mg, Oral, Daily, Take 2 tablets in morning;1 tablet at night      ondansetron ODT 8 MG disintegrating tablet  Commonly known as: ZOFRAN-ODT  What changed: Another medication with the same name was removed. Continue taking this medication, and follow the directions you see here.   Place 1 tablet under tongue 3 times a day as needed.      sodium hypochlorite 0.125 % solution topical solution 0.125%  Commonly known as: DAKIN'S 1/4 STRENGTH  What changed: Another medication with the same name was removed. Continue taking this medication, and follow the directions you see here.   Apply to affected area twice daily             Continue These Medications        Instructions Start Date   amitriptyline 25 MG tablet  Commonly known as: ELAVIL   Take 2 tablets by mouth Daily at bedtime.      Aspirin 81 MG capsule   81 mg, Oral, Every 24 Hours      busPIRone 10 MG tablet  Commonly known as: BUSPAR   10 mg, Oral, 2 Times Daily PRN      calcitriol 0.5 MCG capsule  Commonly known as: ROCALTROL   0.5 mcg, Oral, Daily      carvedilol 6.25 MG tablet  Commonly known as: COREG   6.25 mg, Oral, 2 Times Daily      cloNIDine 0.1 MG tablet  Commonly known as: CATAPRES   0.1 mg, Oral, Every 12 Hours Scheduled      Diclofenac Sodium 1 % gel gel  Commonly known as: VOLTAREN   2 g, Topical, 4 Times Daily  PRN      donepezil 10 MG tablet  Commonly known as: ARICEPT   10 mg, Oral, Daily      HumuLIN R U-500 KwikPen 500 UNIT/ML solution pen-injector CONCENTRATED injection  Generic drug: Insulin Regular Human (Conc)   120 Units, Subcutaneous, 3 Times Daily Before Meals      HYDROcodone-acetaminophen 7.5-325 MG per tablet  Commonly known as: NORCO   1 tablet, Oral, 2 Times Daily PRN      Jardiance 25 MG tablet tablet  Generic drug: empagliflozin   25 mg, Oral, Daily      pantoprazole 40 MG EC tablet  Commonly known as: Protonix   40 mg, Oral, 2 Times Daily      pregabalin 100 MG capsule  Commonly known as: LYRICA   Take 1 capsule by mouth Daily With Breakfast AND 2 capsules Every Night.      rosuvastatin 40 MG tablet  Commonly known as: CRESTOR   40 mg, Oral, Nightly      tamsulosin 0.4 MG capsule 24 hr capsule  Commonly known as: FLOMAX   0.4 mg, Oral, Daily      Trulicity 4.5 MG/0.5ML solution pen-injector  Generic drug: Dulaglutide  Notes to patient: As per home schedule   4.5 mg, Subcutaneous, Weekly      vitamin D3 125 MCG (5000 UT) tablet tablet   Take 1 tablet by mouth Daily with meal             Stop These Medications      Azelastine HCl 137 MCG/SPRAY solution            Discharge Diet:   Diet Instructions       Diet: Regular/House Diet, Cardiac Diets, Diabetic Diets, Renal Diets; Low Sodium (2g); Regular Texture (IDDSI 7); Thin (IDDSI 0); Consistent Carbohydrate; Low Sodium (2-3g)      Discharge Diet:  Regular/House Diet  Cardiac Diets  Diabetic Diets  Renal Diets       Cardiac Diet: Low Sodium (2g)    Texture: Regular Texture (IDDSI 7)    Fluid Consistency: Thin (IDDSI 0)    Diabetic Diet: Consistent Carbohydrate    Renal Diet: Low Sodium (2-3g)            Activity at Discharge:   Activity Instructions       Activity as Tolerated              Follow-up Appointments:   1.  Follow-up with Dr. Shetty in 1 week.  2.  Follow-up with upcoming appointment with GI.  Future Appointments   Date Time Provider Department  [place]   9/11/2023 12:30 PM [facility] P[facility] PAT 31 RM 2 [facility] PAD PAT PAD   8/5/2024  9:00 AM Emeka Garay MD [facility] CD PAD PAD       Test Results Pending at Discharge: none.    Electronically signed by SUSAN Roger, 08/23/23, 10:38 CDT.    Time: 35 minutes.

## 2023-08-23 NOTE — CONSULTS
Discussed with SUSAN Owen, who stated to hold off on iv placement at this time.  Updated pt's nurse.  Reconsult if needed.

## 2023-08-24 LAB
BACTERIA SPEC AEROBE CULT: ABNORMAL
GRAM STN SPEC: ABNORMAL
GRAM STN SPEC: ABNORMAL

## 2023-08-24 NOTE — OUTREACH NOTE
Prep Survey      Flowsheet Row Responses   [facility] facility patient discharged from? [place]   Is LACE score < 7 ? No   Eligibility Readm Mgmt   Discharge diagnosis *Syncope and collapse   Does the patient have one of the following disease processes/diagnoses(primary or secondary)? Other   Does the patient have Home health ordered? No   Is there a DME ordered? No   Prep survey completed? Yes            MARIA DE JESUS RHODES - Registered Nurse

## 2023-08-28 ENCOUNTER — APPOINTMENT (OUTPATIENT)
Dept: GENERAL RADIOLOGY | Facility: HOSPITAL | Age: 68
End: 2023-08-28
Payer: MEDICARE

## 2023-08-28 ENCOUNTER — HOSPITAL ENCOUNTER (OUTPATIENT)
Facility: HOSPITAL | Age: 68
Setting detail: OBSERVATION
Discharge: HOME | End: 2023-08-29
Attending: INTERNAL MEDICINE | Admitting: INTERNAL MEDICINE
Payer: COMMERCIAL

## 2023-08-28 DIAGNOSIS — B34.8 RHINOVIRUS: ICD-10-CM

## 2023-08-28 DIAGNOSIS — R55 SYNCOPE, UNSPECIFIED SYNCOPE TYPE: Primary | ICD-10-CM

## 2023-08-28 PROBLEM — N18.4 ACUTE RENAL FAILURE SUPERIMPOSED ON STAGE 4 CHRONIC KIDNEY DISEASE: Status: ACTIVE | Noted: 2023-02-25

## 2023-08-28 PROBLEM — I73.9 PERIPHERAL VASCULAR DISEASE: Status: ACTIVE | Noted: 2023-08-28

## 2023-08-28 LAB
ALBUMIN SERPL-MCNC: 3.8 G/DL (ref 3.5–5.2)
ALBUMIN/GLOB SERPL: 1.4 G/DL
ALP SERPL-CCNC: 81 U/L (ref 39–117)
ALT SERPL W P-5'-P-CCNC: 11 U/L (ref 1–41)
ANION GAP SERPL CALCULATED.3IONS-SCNC: 14 MMOL/L (ref 5–15)
AST SERPL-CCNC: 17 U/L (ref 1–40)
B PARAPERT DNA SPEC QL NAA+PROBE: NOT DETECTED
B PERT DNA SPEC QL NAA+PROBE: NOT DETECTED
BACTERIA UR QL AUTO: ABNORMAL /HPF
BASOPHILS # BLD AUTO: 0.05 10*3/MM3 (ref 0–0.2)
BASOPHILS NFR BLD AUTO: 0.5 % (ref 0–1.5)
BILIRUB SERPL-MCNC: 0.6 MG/DL (ref 0–1.2)
BILIRUB UR QL STRIP: NEGATIVE
BUN SERPL-MCNC: 35 MG/DL (ref 8–23)
BUN/CREAT SERPL: 12.6 (ref 7–25)
C PNEUM DNA NPH QL NAA+NON-PROBE: NOT DETECTED
CALCIUM SPEC-SCNC: 8.8 MG/DL (ref 8.6–10.5)
CHLORIDE SERPL-SCNC: 98 MMOL/L (ref 98–107)
CLARITY UR: CLEAR
CO2 SERPL-SCNC: 25 MMOL/L (ref 22–29)
COLOR UR: YELLOW
CREAT SERPL-MCNC: 2.78 MG/DL (ref 0.76–1.27)
D-LACTATE SERPL-SCNC: 0.8 MMOL/L (ref 0.5–2)
DEPRECATED RDW RBC AUTO: 48.5 FL (ref 37–54)
EGFRCR SERPLBLD CKD-EPI 2021: 24.2 ML/MIN/1.73
EOSINOPHIL # BLD AUTO: 0.29 10*3/MM3 (ref 0–0.4)
EOSINOPHIL NFR BLD AUTO: 2.7 % (ref 0.3–6.2)
ERYTHROCYTE [DISTWIDTH] IN BLOOD BY AUTOMATED COUNT: 15.5 % (ref 12.3–15.4)
FLUAV SUBTYP SPEC NAA+PROBE: NOT DETECTED
FLUBV RNA ISLT QL NAA+PROBE: NOT DETECTED
GLOBULIN UR ELPH-MCNC: 2.7 GM/DL
GLUCOSE BLDC GLUCOMTR-MCNC: 284 MG/DL (ref 70–130)
GLUCOSE SERPL-MCNC: 291 MG/DL (ref 65–99)
GLUCOSE UR STRIP-MCNC: ABNORMAL MG/DL
HADV DNA SPEC NAA+PROBE: NOT DETECTED
HCOV 229E RNA SPEC QL NAA+PROBE: NOT DETECTED
HCOV HKU1 RNA SPEC QL NAA+PROBE: NOT DETECTED
HCOV NL63 RNA SPEC QL NAA+PROBE: NOT DETECTED
HCOV OC43 RNA SPEC QL NAA+PROBE: NOT DETECTED
HCT VFR BLD AUTO: 34.7 % (ref 37.5–51)
HGB BLD-MCNC: 11 G/DL (ref 13–17.7)
HGB UR QL STRIP.AUTO: ABNORMAL
HMPV RNA NPH QL NAA+NON-PROBE: NOT DETECTED
HPIV1 RNA ISLT QL NAA+PROBE: NOT DETECTED
HPIV2 RNA SPEC QL NAA+PROBE: NOT DETECTED
HPIV3 RNA NPH QL NAA+PROBE: NOT DETECTED
HPIV4 P GENE NPH QL NAA+PROBE: NOT DETECTED
HYALINE CASTS UR QL AUTO: ABNORMAL /LPF
IMM GRANULOCYTES # BLD AUTO: 0.05 10*3/MM3 (ref 0–0.05)
IMM GRANULOCYTES NFR BLD AUTO: 0.5 % (ref 0–0.5)
KETONES UR QL STRIP: NEGATIVE
LEUKOCYTE ESTERASE UR QL STRIP.AUTO: NEGATIVE
LYMPHOCYTES # BLD AUTO: 1.92 10*3/MM3 (ref 0.7–3.1)
LYMPHOCYTES NFR BLD AUTO: 17.9 % (ref 19.6–45.3)
M PNEUMO IGG SER IA-ACNC: NOT DETECTED
MCH RBC QN AUTO: 27.2 PG (ref 26.6–33)
MCHC RBC AUTO-ENTMCNC: 31.7 G/DL (ref 31.5–35.7)
MCV RBC AUTO: 85.7 FL (ref 79–97)
MONOCYTES # BLD AUTO: 1.12 10*3/MM3 (ref 0.1–0.9)
MONOCYTES NFR BLD AUTO: 10.5 % (ref 5–12)
NEUTROPHILS NFR BLD AUTO: 67.9 % (ref 42.7–76)
NEUTROPHILS NFR BLD AUTO: 7.27 10*3/MM3 (ref 1.7–7)
NITRITE UR QL STRIP: NEGATIVE
NRBC BLD AUTO-RTO: 0 /100 WBC (ref 0–0.2)
PH UR STRIP.AUTO: 5.5 [PH] (ref 5–8)
PLATELET # BLD AUTO: 251 10*3/MM3 (ref 140–450)
PMV BLD AUTO: 11.5 FL (ref 6–12)
POTASSIUM SERPL-SCNC: 4.4 MMOL/L (ref 3.5–5.2)
PROCALCITONIN SERPL-MCNC: 0.11 NG/ML (ref 0–0.25)
PROT SERPL-MCNC: 6.5 G/DL (ref 6–8.5)
PROT UR QL STRIP: ABNORMAL
RBC # BLD AUTO: 4.05 10*6/MM3 (ref 4.14–5.8)
RBC # UR STRIP: ABNORMAL /HPF
REF LAB TEST METHOD: ABNORMAL
RHINOVIRUS RNA SPEC NAA+PROBE: DETECTED
RSV RNA NPH QL NAA+NON-PROBE: NOT DETECTED
SARS-COV-2 RNA NPH QL NAA+NON-PROBE: NOT DETECTED
SODIUM SERPL-SCNC: 137 MMOL/L (ref 136–145)
SP GR UR STRIP: 1.02 (ref 1–1.03)
SQUAMOUS #/AREA URNS HPF: ABNORMAL /HPF
TROPONIN T SERPL HS-MCNC: 52 NG/L
UROBILINOGEN UR QL STRIP: ABNORMAL
WBC # UR STRIP: ABNORMAL /HPF
WBC NRBC COR # BLD: 10.7 10*3/MM3 (ref 3.4–10.8)

## 2023-08-28 PROCEDURE — 84145 PROCALCITONIN (PCT): CPT | Performed by: NURSE PRACTITIONER

## 2023-08-28 PROCEDURE — 71045 X-RAY EXAM CHEST 1 VIEW: CPT

## 2023-08-28 PROCEDURE — 82948 REAGENT STRIP/BLOOD GLUCOSE: CPT

## 2023-08-28 PROCEDURE — 93010 ELECTROCARDIOGRAM REPORT: CPT | Performed by: INTERNAL MEDICINE

## 2023-08-28 PROCEDURE — 99284 EMERGENCY DEPT VISIT MOD MDM: CPT

## 2023-08-28 PROCEDURE — 0202U NFCT DS 22 TRGT SARS-COV-2: CPT | Performed by: NURSE PRACTITIONER

## 2023-08-28 PROCEDURE — 93005 ELECTROCARDIOGRAM TRACING: CPT

## 2023-08-28 PROCEDURE — 83605 ASSAY OF LACTIC ACID: CPT | Performed by: NURSE PRACTITIONER

## 2023-08-28 PROCEDURE — 81001 URINALYSIS AUTO W/SCOPE: CPT | Performed by: NURSE PRACTITIONER

## 2023-08-28 PROCEDURE — 84484 ASSAY OF TROPONIN QUANT: CPT | Performed by: NURSE PRACTITIONER

## 2023-08-28 PROCEDURE — 80053 COMPREHEN METABOLIC PANEL: CPT | Performed by: NURSE PRACTITIONER

## 2023-08-28 PROCEDURE — 85025 COMPLETE CBC W/AUTO DIFF WBC: CPT | Performed by: NURSE PRACTITIONER

## 2023-08-28 RX ORDER — INSULIN LISPRO 100 [IU]/ML
4-24 INJECTION, SOLUTION INTRAVENOUS; SUBCUTANEOUS
Status: DISCONTINUED | OUTPATIENT
Start: 2023-08-28 | End: 2023-08-29 | Stop reason: HOSPADM

## 2023-08-28 RX ORDER — AMOXICILLIN 250 MG
1 CAPSULE ORAL NIGHTLY PRN
Status: DISCONTINUED | OUTPATIENT
Start: 2023-08-28 | End: 2023-08-29 | Stop reason: HOSPADM

## 2023-08-28 RX ORDER — ASPIRIN 81 MG/1
81 TABLET, CHEWABLE ORAL DAILY
Status: DISCONTINUED | OUTPATIENT
Start: 2023-08-29 | End: 2023-08-28

## 2023-08-28 RX ORDER — DEXTROSE MONOHYDRATE 25 G/50ML
25 INJECTION, SOLUTION INTRAVENOUS
Status: DISCONTINUED | OUTPATIENT
Start: 2023-08-28 | End: 2023-08-29 | Stop reason: HOSPADM

## 2023-08-28 RX ORDER — HYDROCODONE BITARTRATE AND ACETAMINOPHEN 7.5; 325 MG/1; MG/1
1 TABLET ORAL 2 TIMES DAILY PRN
Status: DISCONTINUED | OUTPATIENT
Start: 2023-08-28 | End: 2023-08-29 | Stop reason: HOSPADM

## 2023-08-28 RX ORDER — ENOXAPARIN SODIUM 100 MG/ML
40 INJECTION SUBCUTANEOUS DAILY
Status: DISCONTINUED | OUTPATIENT
Start: 2023-08-28 | End: 2023-08-28 | Stop reason: DRUGHIGH

## 2023-08-28 RX ORDER — ONDANSETRON 4 MG/1
4 TABLET, FILM COATED ORAL EVERY 6 HOURS PRN
Status: DISCONTINUED | OUTPATIENT
Start: 2023-08-28 | End: 2023-08-29 | Stop reason: HOSPADM

## 2023-08-28 RX ORDER — BISACODYL 10 MG
10 SUPPOSITORY, RECTAL RECTAL DAILY PRN
Status: DISCONTINUED | OUTPATIENT
Start: 2023-08-28 | End: 2023-08-29 | Stop reason: HOSPADM

## 2023-08-28 RX ORDER — SODIUM CHLORIDE 9 MG/ML
40 INJECTION, SOLUTION INTRAVENOUS AS NEEDED
Status: DISCONTINUED | OUTPATIENT
Start: 2023-08-28 | End: 2023-08-29 | Stop reason: HOSPADM

## 2023-08-28 RX ORDER — SODIUM CHLORIDE 0.9 % (FLUSH) 0.9 %
10 SYRINGE (ML) INJECTION AS NEEDED
Status: DISCONTINUED | OUTPATIENT
Start: 2023-08-28 | End: 2023-08-29 | Stop reason: HOSPADM

## 2023-08-28 RX ORDER — ONDANSETRON 2 MG/ML
4 INJECTION INTRAMUSCULAR; INTRAVENOUS EVERY 6 HOURS PRN
Status: DISCONTINUED | OUTPATIENT
Start: 2023-08-28 | End: 2023-08-29 | Stop reason: HOSPADM

## 2023-08-28 RX ORDER — ROSUVASTATIN CALCIUM 20 MG/1
40 TABLET, COATED ORAL NIGHTLY
Status: DISCONTINUED | OUTPATIENT
Start: 2023-08-28 | End: 2023-08-29 | Stop reason: HOSPADM

## 2023-08-28 RX ORDER — ACETAMINOPHEN 160 MG/5ML
650 SOLUTION ORAL EVERY 4 HOURS PRN
Status: DISCONTINUED | OUTPATIENT
Start: 2023-08-28 | End: 2023-08-29 | Stop reason: HOSPADM

## 2023-08-28 RX ORDER — POLYETHYLENE GLYCOL 3350 17 G/17G
17 POWDER, FOR SOLUTION ORAL DAILY
Status: DISCONTINUED | OUTPATIENT
Start: 2023-08-29 | End: 2023-08-29 | Stop reason: HOSPADM

## 2023-08-28 RX ORDER — SODIUM CHLORIDE 0.9 % (FLUSH) 0.9 %
10 SYRINGE (ML) INJECTION EVERY 12 HOURS SCHEDULED
Status: DISCONTINUED | OUTPATIENT
Start: 2023-08-28 | End: 2023-08-29 | Stop reason: HOSPADM

## 2023-08-28 RX ORDER — ACETAMINOPHEN 650 MG/1
650 SUPPOSITORY RECTAL EVERY 4 HOURS PRN
Status: DISCONTINUED | OUTPATIENT
Start: 2023-08-28 | End: 2023-08-29 | Stop reason: HOSPADM

## 2023-08-28 RX ORDER — NICOTINE POLACRILEX 4 MG
15 LOZENGE BUCCAL
Status: DISCONTINUED | OUTPATIENT
Start: 2023-08-28 | End: 2023-08-29 | Stop reason: HOSPADM

## 2023-08-28 RX ORDER — CARVEDILOL 6.25 MG/1
6.25 TABLET ORAL 2 TIMES DAILY WITH MEALS
Status: DISCONTINUED | OUTPATIENT
Start: 2023-08-28 | End: 2023-08-29 | Stop reason: HOSPADM

## 2023-08-28 RX ORDER — BUMETANIDE 1 MG/1
2 TABLET ORAL DAILY
Status: DISCONTINUED | OUTPATIENT
Start: 2023-08-29 | End: 2023-08-28

## 2023-08-28 RX ORDER — CALCITRIOL 0.25 UG/1
0.5 CAPSULE, LIQUID FILLED ORAL DAILY
Status: DISCONTINUED | OUTPATIENT
Start: 2023-08-29 | End: 2023-08-29 | Stop reason: HOSPADM

## 2023-08-28 RX ORDER — ASPIRIN 300 MG/1
300 SUPPOSITORY RECTAL DAILY
Status: DISCONTINUED | OUTPATIENT
Start: 2023-08-29 | End: 2023-08-29 | Stop reason: HOSPADM

## 2023-08-28 RX ORDER — BISACODYL 5 MG/1
5 TABLET, DELAYED RELEASE ORAL DAILY PRN
Status: DISCONTINUED | OUTPATIENT
Start: 2023-08-28 | End: 2023-08-29 | Stop reason: HOSPADM

## 2023-08-28 RX ORDER — CLONIDINE HYDROCHLORIDE 0.1 MG/1
0.1 TABLET ORAL EVERY 12 HOURS SCHEDULED
Status: DISCONTINUED | OUTPATIENT
Start: 2023-08-28 | End: 2023-08-29 | Stop reason: HOSPADM

## 2023-08-28 RX ORDER — BUSPIRONE HYDROCHLORIDE 10 MG/1
10 TABLET ORAL 2 TIMES DAILY PRN
Status: DISCONTINUED | OUTPATIENT
Start: 2023-08-28 | End: 2023-08-29 | Stop reason: HOSPADM

## 2023-08-28 RX ORDER — AMOXICILLIN 250 MG
1 CAPSULE ORAL NIGHTLY
Status: DISCONTINUED | OUTPATIENT
Start: 2023-08-28 | End: 2023-08-29 | Stop reason: HOSPADM

## 2023-08-28 RX ORDER — ENOXAPARIN SODIUM 100 MG/ML
40 INJECTION SUBCUTANEOUS EVERY 12 HOURS SCHEDULED
Status: DISCONTINUED | OUTPATIENT
Start: 2023-08-29 | End: 2023-08-29 | Stop reason: HOSPADM

## 2023-08-28 RX ORDER — ACETAMINOPHEN 325 MG/1
650 TABLET ORAL EVERY 4 HOURS PRN
Status: DISCONTINUED | OUTPATIENT
Start: 2023-08-28 | End: 2023-08-29 | Stop reason: HOSPADM

## 2023-08-28 RX ORDER — AMITRIPTYLINE HYDROCHLORIDE 25 MG/1
50 TABLET, FILM COATED ORAL NIGHTLY
Status: DISCONTINUED | OUTPATIENT
Start: 2023-08-28 | End: 2023-08-29 | Stop reason: HOSPADM

## 2023-08-28 RX ORDER — TAMSULOSIN HYDROCHLORIDE 0.4 MG/1
0.4 CAPSULE ORAL DAILY
Status: DISCONTINUED | OUTPATIENT
Start: 2023-08-29 | End: 2023-08-29 | Stop reason: HOSPADM

## 2023-08-28 RX ORDER — PANTOPRAZOLE SODIUM 40 MG/1
40 TABLET, DELAYED RELEASE ORAL 2 TIMES DAILY
Status: DISCONTINUED | OUTPATIENT
Start: 2023-08-28 | End: 2023-08-29 | Stop reason: HOSPADM

## 2023-08-28 RX ORDER — ASPIRIN 325 MG
325 TABLET ORAL DAILY
Status: DISCONTINUED | OUTPATIENT
Start: 2023-08-29 | End: 2023-08-29 | Stop reason: HOSPADM

## 2023-08-28 RX ORDER — POLYETHYLENE GLYCOL 3350 17 G/17G
17 POWDER, FOR SOLUTION ORAL DAILY PRN
Status: DISCONTINUED | OUTPATIENT
Start: 2023-08-28 | End: 2023-08-29 | Stop reason: HOSPADM

## 2023-08-28 RX ORDER — DONEPEZIL HYDROCHLORIDE 10 MG/1
10 TABLET, FILM COATED ORAL DAILY
Status: DISCONTINUED | OUTPATIENT
Start: 2023-08-29 | End: 2023-08-29 | Stop reason: HOSPADM

## 2023-08-29 ENCOUNTER — APPOINTMENT (OUTPATIENT)
Dept: MRI IMAGING | Facility: HOSPITAL | Age: 68
End: 2023-08-29
Payer: COMMERCIAL

## 2023-08-29 ENCOUNTER — READMISSION MANAGEMENT (OUTPATIENT)
Dept: CALL CENTER | Facility: HOSPITAL | Age: 68
End: 2023-08-29
Payer: COMMERCIAL

## 2023-08-29 ENCOUNTER — APPOINTMENT (OUTPATIENT)
Dept: ULTRASOUND IMAGING | Facility: HOSPITAL | Age: 68
End: 2023-08-29
Payer: COMMERCIAL

## 2023-08-29 VITALS
HEIGHT: 72 IN | WEIGHT: 315 LBS | TEMPERATURE: 97.7 F | SYSTOLIC BLOOD PRESSURE: 125 MMHG | HEART RATE: 58 BPM | DIASTOLIC BLOOD PRESSURE: 77 MMHG | OXYGEN SATURATION: 100 % | RESPIRATION RATE: 18 BRPM | BODY MASS INDEX: 42.66 KG/M2

## 2023-08-29 PROBLEM — R33.8 ACUTE URINARY RETENTION: Status: RESOLVED | Noted: 2023-06-15 | Resolved: 2023-08-29

## 2023-08-29 PROBLEM — N18.4 CHRONIC KIDNEY DISEASE (CKD), STAGE IV (SEVERE): Status: ACTIVE | Noted: 2023-08-29

## 2023-08-29 PROBLEM — L23.7 POISON IVY DERMATITIS: Status: RESOLVED | Noted: 2023-06-18 | Resolved: 2023-08-29

## 2023-08-29 PROBLEM — N17.9 ACUTE RENAL FAILURE SUPERIMPOSED ON STAGE 4 CHRONIC KIDNEY DISEASE: Status: RESOLVED | Noted: 2023-02-25 | Resolved: 2023-08-29

## 2023-08-29 PROBLEM — N18.4 ACUTE RENAL FAILURE SUPERIMPOSED ON STAGE 4 CHRONIC KIDNEY DISEASE: Status: RESOLVED | Noted: 2023-02-25 | Resolved: 2023-08-29

## 2023-08-29 LAB
ANION GAP SERPL CALCULATED.3IONS-SCNC: 10 MMOL/L (ref 5–15)
BUN SERPL-MCNC: 33 MG/DL (ref 8–23)
BUN/CREAT SERPL: 14.3 (ref 7–25)
CALCIUM SPEC-SCNC: 8.8 MG/DL (ref 8.6–10.5)
CHLORIDE SERPL-SCNC: 101 MMOL/L (ref 98–107)
CO2 SERPL-SCNC: 28 MMOL/L (ref 22–29)
CREAT SERPL-MCNC: 2.31 MG/DL (ref 0.76–1.27)
DEPRECATED RDW RBC AUTO: 48.7 FL (ref 37–54)
EGFRCR SERPLBLD CKD-EPI 2021: 30.2 ML/MIN/1.73
ERYTHROCYTE [DISTWIDTH] IN BLOOD BY AUTOMATED COUNT: 15.5 % (ref 12.3–15.4)
GLUCOSE BLDC GLUCOMTR-MCNC: 120 MG/DL (ref 70–130)
GLUCOSE BLDC GLUCOMTR-MCNC: 164 MG/DL (ref 70–130)
GLUCOSE BLDC GLUCOMTR-MCNC: 176 MG/DL (ref 70–130)
GLUCOSE BLDC GLUCOMTR-MCNC: 259 MG/DL (ref 70–130)
GLUCOSE BLDC GLUCOMTR-MCNC: 259 MG/DL (ref 70–130)
GLUCOSE SERPL-MCNC: 191 MG/DL (ref 65–99)
HCT VFR BLD AUTO: 33.1 % (ref 37.5–51)
HGB BLD-MCNC: 10.4 G/DL (ref 13–17.7)
MCH RBC QN AUTO: 26.9 PG (ref 26.6–33)
MCHC RBC AUTO-ENTMCNC: 31.4 G/DL (ref 31.5–35.7)
MCV RBC AUTO: 85.8 FL (ref 79–97)
PLATELET # BLD AUTO: 211 10*3/MM3 (ref 140–450)
PMV BLD AUTO: 11.1 FL (ref 6–12)
POTASSIUM SERPL-SCNC: 4.2 MMOL/L (ref 3.5–5.2)
QT INTERVAL: 434 MS
QTC INTERVAL: 481 MS
RBC # BLD AUTO: 3.86 10*6/MM3 (ref 4.14–5.8)
SODIUM SERPL-SCNC: 139 MMOL/L (ref 136–145)
WBC NRBC COR # BLD: 8.21 10*3/MM3 (ref 3.4–10.8)

## 2023-08-29 PROCEDURE — 97166 OT EVAL MOD COMPLEX 45 MIN: CPT

## 2023-08-29 PROCEDURE — 63710000001 INSULIN LISPRO (HUMAN) PER 5 UNITS: Performed by: NURSE PRACTITIONER

## 2023-08-29 PROCEDURE — 93880 EXTRACRANIAL BILAT STUDY: CPT | Performed by: SURGERY

## 2023-08-29 PROCEDURE — 85027 COMPLETE CBC AUTOMATED: CPT | Performed by: NURSE PRACTITIONER

## 2023-08-29 PROCEDURE — 82948 REAGENT STRIP/BLOOD GLUCOSE: CPT

## 2023-08-29 PROCEDURE — G0378 HOSPITAL OBSERVATION PER HR: HCPCS

## 2023-08-29 PROCEDURE — 93880 EXTRACRANIAL BILAT STUDY: CPT

## 2023-08-29 PROCEDURE — 25010000002 ENOXAPARIN PER 10 MG: Performed by: NURSE PRACTITIONER

## 2023-08-29 PROCEDURE — 70551 MRI BRAIN STEM W/O DYE: CPT

## 2023-08-29 PROCEDURE — 80048 BASIC METABOLIC PNL TOTAL CA: CPT | Performed by: NURSE PRACTITIONER

## 2023-08-29 PROCEDURE — 96372 THER/PROPH/DIAG INJ SC/IM: CPT

## 2023-08-29 RX ADMIN — AMITRIPTYLINE HYDROCHLORIDE 50 MG: 25 TABLET, FILM COATED ORAL at 01:00

## 2023-08-29 RX ADMIN — CARVEDILOL 6.25 MG: 6.25 TABLET, FILM COATED ORAL at 00:59

## 2023-08-29 RX ADMIN — ASPIRIN 325 MG: 325 TABLET, FILM COATED ORAL at 10:47

## 2023-08-29 RX ADMIN — ROSUVASTATIN CALCIUM 40 MG: 20 TABLET, FILM COATED ORAL at 01:00

## 2023-08-29 RX ADMIN — DOCUSATE SODIUM 50 MG AND SENNOSIDES 8.6 MG 1 TABLET: 8.6; 5 TABLET, FILM COATED ORAL at 00:59

## 2023-08-29 RX ADMIN — CLONIDINE HYDROCHLORIDE 0.1 MG: 0.1 TABLET ORAL at 10:47

## 2023-08-29 RX ADMIN — INSULIN LISPRO 12 UNITS: 100 INJECTION, SOLUTION INTRAVENOUS; SUBCUTANEOUS at 00:59

## 2023-08-29 RX ADMIN — ENOXAPARIN SODIUM 40 MG: 100 INJECTION SUBCUTANEOUS at 10:48

## 2023-08-29 RX ADMIN — CALCITRIOL 0.5 MCG: 0.25 CAPSULE ORAL at 10:48

## 2023-08-29 RX ADMIN — PANTOPRAZOLE SODIUM 40 MG: 40 TABLET, DELAYED RELEASE ORAL at 10:48

## 2023-08-29 RX ADMIN — TAMSULOSIN HYDROCHLORIDE 0.4 MG: 0.4 CAPSULE ORAL at 10:48

## 2023-08-29 RX ADMIN — EMPAGLIFLOZIN 25 MG: 25 TABLET, FILM COATED ORAL at 10:48

## 2023-08-29 RX ADMIN — CARVEDILOL 6.25 MG: 6.25 TABLET, FILM COATED ORAL at 10:47

## 2023-08-29 RX ADMIN — Medication 10 ML: at 01:03

## 2023-08-29 RX ADMIN — PANTOPRAZOLE SODIUM 40 MG: 40 TABLET, DELAYED RELEASE ORAL at 01:00

## 2023-08-29 RX ADMIN — POLYETHYLENE GLYCOL 3350 17 G: 17 POWDER, FOR SOLUTION ORAL at 10:48

## 2023-08-29 RX ADMIN — DONEPEZIL HYDROCHLORIDE 10 MG: 10 TABLET, FILM COATED ORAL at 10:48

## 2023-08-29 RX ADMIN — CLONIDINE HYDROCHLORIDE 0.1 MG: 0.1 TABLET ORAL at 01:00

## 2023-08-29 RX ADMIN — INSULIN LISPRO 12 UNITS: 100 INJECTION, SOLUTION INTRAVENOUS; SUBCUTANEOUS at 12:32

## 2023-08-29 NOTE — PLAN OF CARE
Goal Outcome Evaluation:  Plan of Care Reviewed With: patient        Progress: no change    Pt transferred from ER. Pt is A/O x4. No c/o pain. DYE. PPP. VSS. Up standby assist. Pt up in chair. Chair alarm set. Call light within reach. Safety maintained. Plan of care continued, no changes this shift. Pt to d/c home.

## 2023-08-29 NOTE — H&P
[facility] Medicine Services  HISTORY AND PHYSICAL    Date of Admission: 8/28/2023  Primary Care Physician: Del Shetty MD    Subjective   Primary Historian: Patient and wife    Chief Complaint: Recurrent syncope and collapse, fever    History of Present Illness  Erick Luong is a 68-year-old male with a past medical history of coronary artery disease, diastolic dysfunction followed by Dr. Garay, vascular disease with an upcoming appointment with vascular surgeon, GERD now with chronic nausea with an upcoming appointment with GI, chronic nonhealing wound to the left foot followed by [facility] wound care, left carpal tunnel-severe with planned release per Dr. Soliz in the near future. Patient and wife state that he is following medical/medication t plan as prescribed by Dr. Shetty.  Patient most recently admitted  8/21 - 8/23, 2023 with syncope and collapse.  At that time he complained of a 2-week history of increasing hyperglycemia, weakness, multiple episodes of syncope and collapse.  He also stated at that time he had nausea and chest pain.  Work-up was negative.  Patient was scheduled to see podiatrist today and prior to arrival he again had syncope with collapse with loss of bladder control.  He continued to his appointment with with trousers.  He was noted to have temp greater than 102 and was advised to seek evaluation at [facility] emergency department.  Patient has chronic kidney disease stage IV however GFR has decreased from stage IV to stage V with creatinine 2.78.  He denies nausea or chest pain.  Wife Joan states podiatry states wound on left foot is healing nicely.  She has significant concerns over now month-long history of increasing weakness with episodes of syncope with collapse.  ER work-up also revealed rhinovirus.  He is admitted again for further evaluation treatment.    Review of Systems   Otherwise complete ROS reviewed and negative  except as mentioned in the HPI.    Past Medical History:   Past Medical History:   Diagnosis Date    Arthritis     Autonomic disease     CAD (coronary artery disease) 02/06/2017    Cervical radiculopathy 09/16/2021    Chronic constipation with acute exaccerbation 05/10/2021    Coronary artery disease     Degeneration of cervical intervertebral disc 08/11/2021    Diabetes mellitus     Diabetic foot ulcer 08/31/2020    Diabetic polyneuropathy associated with type 2 diabetes mellitus 01/18/2021    Elevated cholesterol     Gastroesophageal reflux disease 05/13/2019    Headache     HTN (hypertension), benign 05/03/2017    Hyperlipidemia     Hypertension     Mixed hyperlipidemia 02/07/2017    Multiple lung nodules 01/26/2020    5mm, 9 mm RLL identified 1/2020, not present 10/2019.    Myocardial infarction     Osteomyelitis 01/22/2020    Osteomyelitis of fifth toe of right foot 10/07/2019    Pancreatitis     Persistent insomnia 01/20/2020    Renal disorder     Sleep apnea 02/06/2017    Sleep apnea with use of continuous positive airway pressure (CPAP)     NON-COMPLIANT    Slow transit constipation 01/16/2019    Spinal stenosis in cervical region 09/16/2021    Vitamin D deficiency 03/02/2021     Past Surgical History:  Past Surgical History:   Procedure Laterality Date    ABDOMINAL SURGERY      AMPUTATION FOOT / TOE Left 10/2021    5th digit     ANTERIOR CERVICAL DISCECTOMY W/ FUSION N/A 8/5/2022    Procedure: CERVICAL DISCECTOMY ANTERIOR WITH FUSION C5-6 with possible plating of C5-7 with neuromonitoring and 1 c-arm;  Surgeon: Karel Soliz MD;  Location: [facility] OR;  Service: Neurosurgery;  Laterality: N/A;    APPENDECTOMY      BACK SURGERY      CARDIAC CATHETERIZATION Left 02/08/2021    Procedure: Left Heart Cath w poss intervention left anatomical snuff box acess;  Surgeon: Omkar Charles DO;  Location:  [facility] CATH INVASIVE LOCATION;  Service: Cardiology;  Laterality: Left;    CARDIAC SURGERY       "CATARACT EXTRACTION      CERVICAL SPINE SURGERY      COLONOSCOPY N/A 01/31/2017    Normal exam repeat in 5 years    COLONOSCOPY N/A 02/11/2019    Mild acute inflammation    COLONOSCOPY W/ POLYPECTOMY  03/04/2014    Hyperplastic polyp    CORONARY ARTERY BYPASS GRAFT  10/2015    ENDOSCOPY  04/13/2011    Gastritis with hemorrhage    ENDOSCOPY N/A 05/05/2017    Normal exam    ENDOSCOPY N/A 02/11/2019    Gastritis    ENDOSCOPY N/A 09/01/2020    Non-erosive gastritis with hemorrhage    ENDOSCOPY N/A 02/10/2021    Esophagitis    FOOT SURGERY Left     INCISION AND DRAINAGE OF WOUND Left 09/2007    spider bite     Social History:  reports that he quit smoking about 32 years ago. His smoking use included cigarettes. He has never used smokeless tobacco. He reports that he does not drink alcohol and does not use drugs.    Family History: family history includes Alzheimer's disease in his mother; Colon cancer in his father and sister; Colon polyps in his sister; Coronary artery disease in his sister and sister; Heart disease in his father.       Allergies:  Allergies   Allergen Reactions    Cefepime Hives and Anaphylaxis    Bactrim [Sulfamethoxazole-Trimethoprim] Other (See Comments)     \"RENAL FAILURE\"    Vancomycin Itching    Zolpidem Unknown - High Severity     \"makes him crazy\"    Zolpidem Tartrate Unknown - Low Severity and Provider Review Needed    Metronidazole Rash       Medications:  Prior to Admission medications    Medication Sig Start Date End Date Taking? Authorizing Provider   amitriptyline (ELAVIL) 25 MG tablet Take 2 tablets by mouth Daily at bedtime. 2/16/23      ascorbic acid (VITAMIN C) 1000 MG tablet Take 1 tablet by mouth Daily. 8/25/23      Aspirin 81 MG capsule Take 81 mg by mouth Daily.    Provider, MD Bossman   bumetanide (BUMEX) 2 MG tablet Take 1 tablet by mouth Daily. Take 2 tablets in morning;1 tablet at night 8/23/23   Lexie Houston APRN   busPIRone (BUSPAR) 10 MG tablet Take 1 tablet by mouth 2 " (Two) Times a Day As Needed. 6/21/23   Payam Keyes DO   calcitriol (ROCALTROL) 0.5 MCG capsule Take 1 capsule by mouth Daily. 2/23/23      carvedilol (COREG) 6.25 MG tablet Take 1 tablet by mouth 2 (Two) Times a Day. 12/22/22      Cholecalciferol (D-5000) 125 MCG (5000 UT) tablet Take 1 tablet by mouth Daily Before Lunch. 8/25/23      Cholecalciferol 125 MCG (5000 UT) tablet Take 1 tablet by mouth Daily with meal 5/27/22      cloNIDine (CATAPRES) 0.1 MG tablet Take 1 tablet by mouth Every 12 (Twelve) Hours. 6/21/23   Payam Keyes DO   Diclofenac Sodium (VOLTAREN) 1 % gel gel Apply 2 g topically to the appropriate area as directed 4 (Four) Times a Day As Needed. 6/1/23      donepezil (ARICEPT) 10 MG tablet Take 1 tablet by mouth Daily. 7/20/22      Dulaglutide (Trulicity) 4.5 MG/0.5ML solution pen-injector Inject 0.5 mL under the skin into the appropriate area as directed 1 (One) Time Per Week. 7/24/23      empagliflozin (JARDIANCE) 25 MG tablet tablet Take 1 tablet by mouth Daily. 6/22/23   Payam Keyes DO   HYDROcodone-acetaminophen (NORCO) 7.5-325 MG per tablet Take 1 tablet by mouth 2 (Two) Times a Day As Needed. 8/11/23      Insulin Regular Human, Conc, (HumuLIN R U-500 KwikPen) 500 UNIT/ML solution pen-injector CONCENTRATED injection Inject 120 Units under the skin into the appropriate area as directed 2 (Two) Times a Day with breakfast and dinner.  Patient taking differently: Inject 120 Units under the skin into the appropriate area as directed 3 (Three) Times a Day Before Meals. 7/10/23      Insulin Regular Human, Conc, (HumuLIN R U-500 KwikPen) 500 UNIT/ML solution pen-injector CONCENTRATED injection Inject 120 Units under the skin into the appropriate area as directed 3 (Three) Times a Day Before Meals. 8/23/23   Lexie Houston APRN   ondansetron ODT (ZOFRAN-ODT) 8 MG disintegrating tablet Place 1 tablet under tongue 3 times a day as needed. 8/16/23      pantoprazole (Protonix) 40 MG  "EC tablet Take 1 tablet by mouth 2 (Two) Times a Day. 11/8/22      polyethylene glycol (MIRALAX) 17 g packet Take 17 g by mouth Daily. Obtain OTC 8/23/23   Lexie Houston APRN   pregabalin (LYRICA) 100 MG capsule Take 1 capsule by mouth Daily With Breakfast AND 2 capsules Every Night. 7/25/23      rosuvastatin (CRESTOR) 40 MG tablet Take 1 tablet by mouth Every Night. 3/18/22      sennosides-docusate (PERICOLACE) 8.6-50 MG per tablet Take 1 tablet by mouth Every Night. Obtain OTC 8/23/23   HoustonLexie APRN   sennosides-docusate (PERICOLACE) 8.6-50 MG per tablet Take 1 tablet by mouth every night at bedtime. 8/25/23      sodium hypochlorite (DAKIN'S 1/4 STRENGTH) 0.125 % solution topical solution 0.125% Apply to affected area twice daily 7/17/23      tamsulosin (FLOMAX) 0.4 MG capsule 24 hr capsule Take 1 capsule by mouth Daily. 8/7/23      spironolactone (ALDACTONE) 25 MG tablet Take 1 tablet by mouth Daily. 9/28/20 12/31/20  Del Shetty MD     I have utilized all available immediate resources to obtain, update, or review the patient's current medications (including all prescriptions, over-the-counter products, herbals, cannabis/cannabidiol products, and vitamin/mineral/dietary (nutritional) supplements).    Objective     Vital Signs: /76 (Patient Position: Standing)   Pulse 68   Temp 97.7 °F (36.5 °C) (Oral)   Resp 18   Ht 182.9 cm (72\")   Wt (!) 143 kg (315 lb)   SpO2 96%   BMI 42.72 kg/m²   Physical Exam  Vitals reviewed.   Constitutional:       Appearance: He is ill-appearing.   HENT:      Head: Normocephalic and atraumatic.      Mouth/Throat:      Mouth: Mucous membranes are moist.      Pharynx: Oropharynx is clear.   Eyes:      Extraocular Movements: Extraocular movements intact.      Conjunctiva/sclera: Conjunctivae normal.   Cardiovascular:      Rate and Rhythm: Normal rate and regular rhythm.   Pulmonary:      Effort: Pulmonary effort is normal.      Breath sounds: Normal breath sounds. "   Abdominal:      Palpations: Abdomen is soft.      Comments: Protuberant   Musculoskeletal:      Cervical back: Normal range of motion and neck supple.      Right lower leg: No edema.      Comments: Generalized weakness and debility, walking boot and dressing noted to left foot seen today by podiatry   Skin:     General: Skin is warm and dry.      Comments: Flushed   Neurological:      General: No focal deficit present.      Mental Status: He is alert and oriented to person, place, and time.   Psychiatric:         Mood and Affect: Mood normal.         Behavior: Behavior normal.      Results Reviewed:  Lab Results (last 24 hours)       Procedure Component Value Units Date/Time    Urinalysis, Microscopic Only - Urine, Clean Catch [[id number]]  (Abnormal) Collected: 08/28/23 2028    Specimen: Urine, Clean Catch Updated: 08/28/23 2054     RBC, UA 3-5 /HPF      WBC, UA None Seen /HPF      Bacteria, UA None Seen /HPF      Squamous Epithelial Cells, UA None Seen /HPF      Hyaline Casts, UA None Seen /LPF      Methodology Automated Microscopy    Urinalysis With Microscopic If Indicated (No Culture) - Urine, Clean Catch [[id number]]  (Abnormal) Collected: 08/28/23 2028    Specimen: Urine, Clean Catch Updated: 08/28/23 2054     Color, UA Yellow     Appearance, UA Clear     pH, UA 5.5     Specific Gravity, UA 1.018     Glucose, UA >=1000 mg/dL (3+)     Ketones, UA Negative     Bilirubin, UA Negative     Blood, UA Trace     Protein,  mg/dL (2+)     Leuk Esterase, UA Negative     Nitrite, UA Negative     Urobilinogen, UA 0.2 E.U./dL    Respiratory Panel PCR w/COVID-19(SARS-CoV-2) [facility] In-House, NP Swab in UTM/VTM, 3-4 HR TAT - Swab, Nasopharynx [[id number]]  (Abnormal) Collected: 08/28/23 1800    Specimen: Swab from Nasopharynx Updated: 08/28/23 1946     ADENOVIRUS, PCR Not Detected     Coronavirus 229E Not Detected     Coronavirus HKU1 Not Detected     Coronavirus NL63 Not Detected     Coronavirus  OC43 Not Detected     COVID19 Not Detected     Human Metapneumovirus Not Detected     Human Rhinovirus/Enterovirus Detected     Influenza A PCR Not Detected     Influenza B PCR Not Detected     Parainfluenza Virus 1 Not Detected     Parainfluenza Virus 2 Not Detected     Parainfluenza Virus 3 Not Detected     Parainfluenza Virus 4 Not Detected     RSV, PCR Not Detected     Bordetella pertussis pcr Not Detected     Bordetella parapertussis PCR Not Detected     Chlamydophila pneumoniae PCR Not Detected     Mycoplasma pneumo by PCR Not Detected    Procalcitonin [[id number]]  (Normal) Collected: 08/28/23 1805    Specimen: Blood Updated: 08/28/23 1851     Procalcitonin 0.11 ng/mL     Comprehensive Metabolic Panel [[id number]]  (Abnormal) Collected: 08/28/23 1805    Specimen: Blood Updated: 08/28/23 1846     Glucose 291 mg/dL      BUN 35 mg/dL      Creatinine 2.78 mg/dL      Sodium 137 mmol/L      Potassium 4.4 mmol/L      Comment: Slight hemolysis detected by analyzer. Results may be affected.        Chloride 98 mmol/L      CO2 25.0 mmol/L      Calcium 8.8 mg/dL      Total Protein 6.5 g/dL      Albumin 3.8 g/dL      ALT (SGPT) 11 U/L      AST (SGOT) 17 U/L      Alkaline Phosphatase 81 U/L      Total Bilirubin 0.6 mg/dL      Globulin 2.7 gm/dL      A/G Ratio 1.4 g/dL      BUN/Creatinine Ratio 12.6     Anion Gap 14.0 mmol/L      eGFR 24.2 mL/min/1.73     Lactic Acid, Plasma [[id number]]  (Normal) Collected: 08/28/23 1805    Specimen: Blood Updated: 08/28/23 1843     Lactate 0.8 mmol/L     Single High Sensitivity Troponin T [[id number]]  (Abnormal) Collected: 08/28/23 1805    Specimen: Blood Updated: 08/28/23 1841     HS Troponin T 52 ng/L     CBC Auto Differential [[id number]]  (Abnormal) Collected: 08/28/23 1805    Specimen: Blood Updated: 08/28/23 1825     WBC 10.70 10*3/mm3      RBC 4.05 10*6/mm3      Hemoglobin 11.0 g/dL      Hematocrit 34.7 %      MCV 85.7 fL      MCH 27.2 pg      MCHC 31.7 g/dL      RDW 15.5 %      RDW-SD  48.5 fl      MPV 11.5 fL      Platelets 251 10*3/mm3      Neutrophil % 67.9 %      Lymphocyte % 17.9 %      Monocyte % 10.5 %      Eosinophil % 2.7 %      Basophil % 0.5 %      Immature Grans % 0.5 %      Neutrophils, Absolute 7.27 10*3/mm3      Lymphocytes, Absolute 1.92 10*3/mm3      Monocytes, Absolute 1.12 10*3/mm3      Eosinophils, Absolute 0.29 10*3/mm3      Basophils, Absolute 0.05 10*3/mm3      Immature Grans, Absolute 0.05 10*3/mm3      nRBC 0.0 /100 WBC     POC Glucose Once [[id number]]  (Abnormal) Collected: 08/28/23 1811    Specimen: Blood Updated: 08/28/23 1822     Glucose 284 mg/dL      Comment: : [id number] AmberAds HeatherMeter ID: [id number]             Imaging Results (Last 24 Hours)       Procedure Component Value Units Date/Time    XR Chest 1 View [[id number]] Collected: 08/28/23 1854     Updated: 08/28/23 1857    Narrative:      EXAMINATION: Chest 1 view 08/28/2023     HISTORY: Syncope     FINDINGS: Today's exam is compared to previous study of 7 days earlier.  Lungs are fully expanded and clear. There is no effusion or free air  present. The patient has undergone prior median sternotomy for coronary  bypass grafting.       Impression:      . No acute disease.  This report was finalized on 08/28/2023 18:54 by Dr. Phillip aHrt MD.          8/21/2023 CTA chest  Summary:  1. Stable chest CT appearance compared with 7 months ago.  2. No convincing evidence of pulmonary embolus.      This report was finalized on 08/21/2023 21:21 by Dr. Gomez Mcgill MD.    8/21/2023  CT head  IMPRESSION:  1. No acute intracranial abnormality is seen.      This report was finalized on 08/21/2023 21:17 by Dr. Gomez Mcgill MD.    8/22/2023 ECHO  Interpretation Summary    Technically difficult study.    Left ventricular systolic function is low normal. Left ventricular ejection fraction appears to be 51 - 55%.    Left ventricular diastolic dysfunction is noted.    Normal right ventricular cavity size and systolic  function noted.    There is no significant (greater than mild) valvular dysfunction.    Compared to study from 1/21/2023, there are no significant changes.     Assessment / Plan   Assessment:   Active Hospital Problems    Diagnosis     **Syncope and collapse, recurrent episodes     Rhinovirus     Peripheral vascular disease     Acute renal failure superimposed on stage 4 chronic kidney disease     Class 3 severe obesity due to excess calories with body mass index (BMI) of 40.0 to 44.9 in adult     Type 2 diabetes mellitus with hyperglycemia, with long-term current use of insulin     Obesity, unspecified obesity severity, unspecified obesity type     Essential hypertension        Treatment Plan  1.  The patient will be admitted to Dr. Garzon's service here at [facility].   2.  MRI of the brain and bilateral carotid study in a.m.  3.  Monitor glucose before meals and at bedtime with regular insulin sliding scale coverage  4.  Supplemental oxygen as needed, incentive spirometery  5.  Labs in a.m.  6.  Home medications reviewed and restarted as appropriate  7.  DVT prophylaxis with Lovenox  8.  Consult PT and OT for strengthening  9.  Consult case management-patient may benefit from home physical therapy    Medical Decision Making  Number and Complexity of problems: 8  Differential Diagnosis: None    Conditions and Status        Condition is unchanged.     [facility] Data  External documents reviewed: No  Cardiac tracing (EKG, telemetry) interpretation: Reviewed  Radiology interpretation: Reviewed  Labs reviewed: Yes  Any tests that were considered but not ordered: No     Decision rules/scores evaluated (example [id number], Wells, etc): No     Discussed with: Patient, wife Joan and Dr. Garzon     Care Planning  Shared decision making:, Wife Joan and Dr. Garzon  Code status and discussions: Full  Case and plan discussed with the nurse practitioner.  Agree with plan.  MRI of the brain in the  morning.    Disposition  Social Determinants of Health that impact treatment or disposition: No  Estimated length of stay is 2+ days.     I confirmed that the patient's advanced care plan is present, code status is documented, and a surrogate decision maker is listed in the patient's medical record.     The patient's surrogate decision maker is wife Joan.     The patient was seen and examined by me on 8/28/2023 at 9:44 PM.    Electronically signed by SUSAN Ash, 08/28/23, 23:23 CDT.

## 2023-08-29 NOTE — DISCHARGE SUMMARY
[facility] Medicine Services  DISCHARGE SUMMARY       Date of Admission: 8/28/2023  Date of Discharge:  8/29/2023  Primary Care Physician: Del Shetty MD    Discharge Diagnoses:  Active Hospital Problems    Diagnosis     **Syncope and collapse, recurrent episodes     Chronic kidney disease (CKD), stage IV (severe)     Rhinovirus     Peripheral vascular disease     Class 3 severe obesity due to excess calories with body mass index (BMI) of 40.0 to 44.9 in adult     Type 2 diabetes mellitus with hyperglycemia, with long-term current use of insulin     Obesity, unspecified obesity severity, unspecified obesity type     Essential hypertension          Presenting Problem/History of Present Illness:  Syncope and collapse [R55]     Chief Complaint on Day of Discharge:   No new complaints    History of Present Illness on Day of Discharge:   Patient is doing well today.  MRI scan of the brain shows no evidence of cute abnormality.   Bilateral carotid Dopplers show no evidence of significant stenosis of either the right or left ICA.  Renal function is at baseline.  Respiratory PCR panel is positive for rhinovirus.  The patient's brittle overall medical condition I suspect that the patient's syncope is likely secondary to combination of his multiple pre-existing medical problems in conjunction with rhinovirus.  Patient is stable for discharge today.  He is to follow-up with his primary care physician next week for further evaluation and recommendations.    Hospital Course  Erick Luong is a 68-year-old male with a past medical history of coronary artery disease, diastolic dysfunction followed by Dr. Garay, vascular disease with an upcoming appointment with vascular surgeon, GERD now with chronic nausea with an upcoming appointment with GI, chronic nonhealing wound to the left foot followed by [facility] wound care, left carpal tunnel-severe with planned release per Dr. Solzi in the  near future. Patient and wife state that he is following medical/medication plan as prescribed by Dr. Shetty.  Patient most recently admitted 8/21 - 8/23, 2023 with syncope and collapse.  At that time he complained of a 2-week history of increasing hyperglycemia, weakness, multiple episodes of syncope and collapse.  He also stated at that time he had nausea and chest pain.  Work-up was negative.  Patient was scheduled to see podiatrist today and prior to arrival he again had syncope with collapse with loss of bladder control.  He continued to his appointment with with trousers.  He was noted to have temp greater than 102 and was advised to seek evaluation at [facility] emergency department.  Patient has chronic kidney disease stage IV however GFR has decreased from stage IV to stage V with creatinine 2.78.  He denies nausea or chest pain.  Wife Joan states podiatry states wound on left foot is healing nicely.  She has significant concerns over now month-long history of increasing weakness with episodes of syncope with collapse.  ER work-up also revealed rhinovirus.  He is admitted again for further evaluation treatment.  Treatment Plan  1.  The patient will be admitted to Dr. Garzon's service here at [facility].   2.  MRI of the brain and bilateral carotid study in a.m.  3.  Monitor glucose before meals and at bedtime with regular insulin sliding scale coverage  4.  Supplemental oxygen as needed, incentive spirometery  5.  Labs in a.m.  6.  Home medications reviewed and restarted as appropriate  7.  DVT prophylaxis with Lovenox  8.  Consult PT and OT for strengthening  9.  Consult case management-patient may benefit from home physical therapy    Result Review    Result Review:  I have personally reviewed the results from the time of this admission to 8/29/2023 16:11 CDT and agree with these findings:  []  Laboratory  []  Microbiology  []  Radiology  []  EKG/Telemetry   []   "Cardiology/Vascular   []  Pathology  []  Old records  []  Other:    Condition on Discharge:    Stable and improved    Physical Exam on Discharge:  /77 (BP Location: Left arm, Patient Position: Sitting)   Pulse 58   Temp 97.7 °F (36.5 °C) (Temporal)   Resp 18   Ht 182.9 cm (72\")   Wt (!) 143 kg (315 lb)   SpO2 100%   BMI 42.72 kg/m²   Physical Exam     Constitutional:       Appearance: He is normal-appearing.   HENT:      Head: Normocephalic and atraumatic.      Mouth: Mucous membranes are moist.      Pharynx: Oropharynx is clear.   Eyes:      Extraocular Movements: Extraocular movements intact.      Conjunctiva/sclera: Conjunctivae normal.   Cardiovascular:      Rate and Rhythm: Normal rate and regular rhythm.   Pulmonary:      Effort: Pulmonary effort is normal.      Breath sounds: Normal breath sounds.   Abdominal:      Palpations: Abdomen is soft.      Comments: Protuberant   Musculoskeletal:      Cervical back: Normal range of motion and neck supple.      Right lower leg: No edema.      Comments: Generalized weakness. Walking boot and dressing LLE.  Skin:     General: Skin is warm and dry.   Neurological:      General: No focal deficit present.      Mental Status: He is alert and oriented to person, place, and time.   Psychiatric:         Mood and Affect: Mood normal.         Behavior: Behavior normal.       Discharge Disposition:  Home or Self Care    Discharge Medications:     Discharge Medications        Continue These Medications        Instructions Start Date   amitriptyline 25 MG tablet  Commonly known as: ELAVIL   Take 2 tablets by mouth Daily at bedtime.      ascorbic acid 1000 MG tablet  Commonly known as: VITAMIN C   1,000 mg, Oral, Daily      Aspirin 81 MG capsule   81 mg, Oral, Every 24 Hours      bumetanide 2 MG tablet  Commonly known as: BUMEX   2 mg, Oral, Daily, Take 2 tablets in morning;1 tablet at night      busPIRone 10 MG tablet  Commonly known as: BUSPAR   10 mg, Oral, 2 Times " Daily PRN      calcitriol 0.5 MCG capsule  Commonly known as: ROCALTROL   0.5 mcg, Oral, Daily      carvedilol 6.25 MG tablet  Commonly known as: COREG   6.25 mg, Oral, 2 Times Daily      cloNIDine 0.1 MG tablet  Commonly known as: CATAPRES   0.1 mg, Oral, Every 12 Hours Scheduled      D-5000 125 MCG (5000 UT) tablet  Generic drug: Cholecalciferol   125 mcg, Oral, Daily Before Lunch      Diclofenac Sodium 1 % gel gel  Commonly known as: VOLTAREN   2 g, Topical, 4 Times Daily PRN      donepezil 10 MG tablet  Commonly known as: ARICEPT   10 mg, Oral, Daily      HumuLIN R U-500 KwikPen 500 UNIT/ML solution pen-injector CONCENTRATED injection  Generic drug: Insulin Regular Human (Conc)   Inject 120 Units under the skin into the appropriate area as directed 2 (Two) Times a Day with breakfast and dinner.      HYDROcodone-acetaminophen 7.5-325 MG per tablet  Commonly known as: NORCO   1 tablet, Oral, 2 Times Daily PRN      Jardiance 25 MG tablet tablet  Generic drug: empagliflozin   25 mg, Oral, Daily      ondansetron ODT 8 MG disintegrating tablet  Commonly known as: ZOFRAN-ODT   Place 1 tablet under tongue 3 times a day as needed.      pantoprazole 40 MG EC tablet  Commonly known as: Protonix   40 mg, Oral, 2 Times Daily      polyethylene glycol 17 g packet  Commonly known as: MIRALAX   17 g, Oral, Daily, Obtain OTC      pregabalin 100 MG capsule  Commonly known as: LYRICA   Take 1 capsule by mouth Daily With Breakfast AND 2 capsules Every Night.      rosuvastatin 40 MG tablet  Commonly known as: CRESTOR   40 mg, Oral, Nightly      sennosides-docusate 8.6-50 MG per tablet  Commonly known as: PERICOLACE   1 tablet, Oral, Nightly, Obtain OTC      Stimulant Laxative 8.6-50 MG per tablet  Generic drug: sennosides-docusate   1 tablet, Oral, Every Night at Bedtime      sodium hypochlorite 0.125 % solution topical solution 0.125%  Commonly known as: DAKIN'S 1/4 STRENGTH   Apply to affected area twice daily      tamsulosin 0.4  MG capsule 24 hr capsule  Commonly known as: FLOMAX   0.4 mg, Oral, Daily      Trulicity 4.5 MG/0.5ML solution pen-injector  Generic drug: Dulaglutide   4.5 mg, Subcutaneous, Weekly               Discharge Diet:   Diet Instructions       Diet: Diabetic Diets; Consistent Carbohydrate; Regular Texture (IDDSI 7); Thin (IDDSI 0)      Discharge Diet: Diabetic Diets    Diabetic Diet: Consistent Carbohydrate    Texture: Regular Texture (IDDSI 7)    Fluid Consistency: Thin (IDDSI 0)            Discharge Care Plan / Instructions:   Discharge home    Activity at Discharge:   Activity Instructions       Activity as Tolerated              Follow-up Appointments:  Follow-up with primary care physician next week    Electronically signed by Payam Keyes DO, 08/29/23, 16:11 CDT.    Time: Discharge less than 30 min    Part of this note may be an electronic transcription/translation of spoken language to printed text using the Dragon Dictation system.

## 2023-08-29 NOTE — PROGRESS NOTES
"Pharmacy Dosing Service  Anticoagulant  Enoxaparin    Assessment/Action/Plan:  Lovenox 40 mg SQ every 24 hours for VTE prophylaxis has been adjusted to 40 mg SQ every 12 hours for patient with BMI ? 40 kg/m2/< 50 kg/m2. Pharmacy will continue to monitor and adjust dose accordingly.       Subjective:  Erick Luong is a 68 y.o. male on Enoxaparin 40 mg SQ every 12 hours for indication of VTE prophylaxis.  Objective:  [Ht: 182.9 cm (72\"); Wt: (!) 143 kg (315 lb); BMI: Body mass index is 42.72 kg/m².]  Estimated Creatinine Clearance: 37.9 mL/min (A) (by C-G formula based on SCr of 2.78 mg/dL (H)).   Lab Results   Component Value Date      Lab Results   Component Value Date      Lab Results   Component Value Date    HGB 11.0 (L) 08/28/2023      Lab Results   Component Value Date     08/28/2023       Fernando Bar, PharmD  08/28/23 23:05 CDT     "

## 2023-08-29 NOTE — ED PROVIDER NOTES
Subjective   History of Present Illness  Patient is a 68-year-old male who presents to the ER with chief complaints of syncope and fever.  Patient was outdoors at a trade show and apparently had a syncopal episode.  Uncertain if he got overheated.  He states there was someone standing beside him who was able to catch his fall.  He did not hit his head.  Uncertain if he had complete loss of consciousness.  Shortly after this occurred patient had a follow-up appointment with Dr. Gomes with podiatry in [place].  On arrival patient was flushed and his temp was recorded at 102.  Patient took Tylenol prior to arrival.  According to wife at bedside Dr. Gomes has been debriding a blister to the left foot.  The podiatrist did not feel this was infected and felt fever was likely from another source.  Patient had a recent admission for syncope.  Per review he was admitted August 21, 2023 to August 23, 2023.  He had a syncopal episode while using the bathroom at [employer].  Patient then got up and walked out to his vehicle and had what he described as a second syncopal episode in the parking lot.  He had had nausea for the past several weeks.  He had an upper GI endoscopy in February which revealed esophagitis.  He takes Protonix on an outpatient basis.  Patient also had elevated glucoses that was adjusted by his PCP with insulin adjustments.  Per review of note patient is medically noncompliant and has had several hospitalizations for volume overload.  Patient's blood sugar was 500 in the emergency department.  CT showed no acute findings.  CT of the chest was negative for PE.  He had a stable chest as compared to previous studies.  Orthostatics were negative.  COVID and flu were both negative.  Transthoracic echocardiogram was performed during hospitalization which showed no significant change when compared to previous echo in January 2023.  Patient's left ventricular systolic function is low normal with an EF of 51 to 55%.   He has history of uncontrolled type 2 diabetes with hyperglycemia.  A1c was 10 on August 16 A1c dating back to December 2021 is 9-10.  Patient has history of coronary artery disease with previous coronary artery bypass grafting.  He has chronic chest pain.  He is followed by Dr. Garay.  Patient has had sweating episodes with a normal white blood count, COVID and flu were negative, CTA showed no pneumonia.  Please see note for complete details.    Past medical history significant for arthritis, cervical radiculopathy, degenerative disc disease, diabetes, hyperlipidemia, hypertension, multiple lung nodules, MI, osteomyelitis, pancreatitis, renal disorder, sleep apnea, vitamin D deficiency, coronary artery disease, lung nodules      Review of Systems   Constitutional:  Positive for fever.   HENT:  Positive for congestion and rhinorrhea.    Respiratory:  Negative for cough and shortness of breath.    Cardiovascular: Negative.  Negative for chest pain.   Gastrointestinal: Negative.  Negative for abdominal pain, diarrhea, nausea and vomiting.   Genitourinary: Negative.  Negative for dysuria.   Musculoskeletal: Negative.    Skin: Negative.    Neurological:  Positive for syncope.   All other systems reviewed and are negative.    Past Medical History:   Diagnosis Date    Arthritis     Autonomic disease     CAD (coronary artery disease) 02/06/2017    Cervical radiculopathy 09/16/2021    Chronic constipation with acute exaccerbation 05/10/2021    Coronary artery disease     Degeneration of cervical intervertebral disc 08/11/2021    Diabetes mellitus     Diabetic foot ulcer 08/31/2020    Diabetic polyneuropathy associated with type 2 diabetes mellitus 01/18/2021    Elevated cholesterol     Gastroesophageal reflux disease 05/13/2019    Headache     HTN (hypertension), benign 05/03/2017    Hyperlipidemia     Hypertension     Mixed hyperlipidemia 02/07/2017    Multiple lung nodules 01/26/2020    5mm, 9 mm RLL identified 1/2020,  "not present 10/2019.    Myocardial infarction     Osteomyelitis 01/22/2020    Osteomyelitis of fifth toe of right foot 10/07/2019    Pancreatitis     Persistent insomnia 01/20/2020    Renal disorder     Sleep apnea 02/06/2017    Sleep apnea with use of continuous positive airway pressure (CPAP)     NON-COMPLIANT    Slow transit constipation 01/16/2019    Spinal stenosis in cervical region 09/16/2021    Vitamin D deficiency 03/02/2021       Allergies   Allergen Reactions    Cefepime Hives and Anaphylaxis    Bactrim [Sulfamethoxazole-Trimethoprim] Other (See Comments)     \"RENAL FAILURE\"    Vancomycin Itching    Zolpidem Unknown - High Severity     \"makes him crazy\"    Zolpidem Tartrate Unknown - Low Severity and Provider Review Needed    Metronidazole Rash       Past Surgical History:   Procedure Laterality Date    ABDOMINAL SURGERY      AMPUTATION FOOT / TOE Left 10/2021    5th digit     ANTERIOR CERVICAL DISCECTOMY W/ FUSION N/A 8/5/2022    Procedure: CERVICAL DISCECTOMY ANTERIOR WITH FUSION C5-6 with possible plating of C5-7 with neuromonitoring and 1 c-arm;  Surgeon: Karel Soliz MD;  Location:  [facility] OR;  Service: Neurosurgery;  Laterality: N/A;    APPENDECTOMY      BACK SURGERY      CARDIAC CATHETERIZATION Left 02/08/2021    Procedure: Left Heart Cath w poss intervention left anatomical snuff box acess;  Surgeon: Omkar Charles DO;  Location:  [facility] CATH INVASIVE LOCATION;  Service: Cardiology;  Laterality: Left;    CARDIAC SURGERY      CATARACT EXTRACTION      CERVICAL SPINE SURGERY      COLONOSCOPY N/A 01/31/2017    Normal exam repeat in 5 years    COLONOSCOPY N/A 02/11/2019    Mild acute inflammation    COLONOSCOPY W/ POLYPECTOMY  03/04/2014    Hyperplastic polyp    CORONARY ARTERY BYPASS GRAFT  10/2015    ENDOSCOPY  04/13/2011    Gastritis with hemorrhage    ENDOSCOPY N/A 05/05/2017    Normal exam    ENDOSCOPY N/A 02/11/2019    Gastritis    ENDOSCOPY N/A 09/01/2020    Non-erosive gastritis " with hemorrhage    ENDOSCOPY N/A 02/10/2021    Esophagitis    FOOT SURGERY Left     INCISION AND DRAINAGE OF WOUND Left 2007    spider bite       Family History   Problem Relation Age of Onset    Colon cancer Father     Heart disease Father     Colon cancer Sister     Colon polyps Sister     Alzheimer's disease Mother     Coronary artery disease Sister     Coronary artery disease Sister        Social History     Socioeconomic History    Marital status:    Tobacco Use    Smoking status: Former     Types: Cigarettes     Quit date:      Years since quittin.6    Smokeless tobacco: Never    Tobacco comments:     smoked in highschool   Vaping Use    Vaping Use: Never used   Substance and Sexual Activity    Alcohol use: No    Drug use: No    Sexual activity: Defer           Objective   Physical Exam  Vitals and nursing note reviewed.   Constitutional:       General: He is not in acute distress.     Appearance: He is well-developed. He is ill-appearing. He is not diaphoretic.      Comments: Chronically ill-appearing   HENT:      Head: Atraumatic.      Right Ear: External ear normal.      Left Ear: External ear normal.      Nose: Nose normal.      Mouth/Throat:      Pharynx: Oropharynx is clear.   Eyes:      General: No scleral icterus.     Extraocular Movements: Extraocular movements intact.      Conjunctiva/sclera: Conjunctivae normal.      Pupils: Pupils are equal, round, and reactive to light.   Neck:      Thyroid: No thyromegaly.      Vascular: No JVD.   Cardiovascular:      Rate and Rhythm: Normal rate and regular rhythm.      Pulses: Normal pulses.      Heart sounds: Normal heart sounds. No murmur heard.  Pulmonary:      Effort: Pulmonary effort is normal. No respiratory distress.      Breath sounds: Rales present. No wheezing.   Chest:      Chest wall: No tenderness.   Abdominal:      General: Bowel sounds are normal. There is no distension.      Palpations: Abdomen is soft. There is no mass.       Tenderness: There is no abdominal tenderness. There is no guarding or rebound.   Musculoskeletal:         General: Normal range of motion.      Cervical back: Normal range of motion and neck supple.      Comments: Orthopedic boot to the left foot   Lymphadenopathy:      Cervical: No cervical adenopathy.   Skin:     General: Skin is warm and dry.      Coloration: Skin is not pale.      Findings: No erythema or rash.   Neurological:      Mental Status: He is alert and oriented to person, place, and time.      Cranial Nerves: No cranial nerve deficit.      Coordination: Coordination normal.      Deep Tendon Reflexes: Reflexes are normal and symmetric.   Psychiatric:         Behavior: Behavior normal.         Thought Content: Thought content normal.         Judgment: Judgment normal.       Procedures           ED Course                                           Medical Decision Making  Patient is a 68-year-old male who presents to the ER with chief complaints of syncope and fever.  Patient was outdoors at a trade show and apparently had a syncopal episode.  Uncertain if he got overheated.  He states there was someone standing beside him who was able to catch his fall.  He did not hit his head.  Uncertain if he had complete loss of consciousness.  Shortly after this occurred patient had a follow-up appointment with Dr. Gomes with podiatry in [place].  On arrival patient was flushed and his temp was recorded at 102.  Patient took Tylenol prior to arrival.  According to wife at bedside Dr. Gomes has been debriding a blister to the left foot.  The podiatrist did not feel this was infected and felt fever was likely from another source.  Patient had a recent admission for syncope.  Per review he was admitted August 21, 2023 to August 23, 2023.  He had a syncopal episode while using the bathroom at [employer].  Patient then got up and walked out to his vehicle and had what he described as a second syncopal episode in the  parking lot.  He had had nausea for the past several weeks.  He had an upper GI endoscopy in February which revealed esophagitis.  He takes Protonix on an outpatient basis.  Patient also had elevated glucoses that was adjusted by his PCP with insulin adjustments.  Per review of note patient is medically noncompliant and has had several hospitalizations for volume overload.  Patient's blood sugar was 500 in the emergency department.  CT showed no acute findings.  CT of the chest was negative for PE.  He had a stable chest as compared to previous studies.  Orthostatics were negative.  COVID and flu were both negative.  Transthoracic echocardiogram was performed during hospitalization which showed no significant change when compared to previous echo in January 2023.  Patient's left ventricular systolic function is low normal with an EF of 51 to 55%.  He has history of uncontrolled type 2 diabetes with hyperglycemia.  A1c was 10 on August 16 A1c dating back to December 2021 is 9-10.  Patient has history of coronary artery disease with previous coronary artery bypass grafting.  He has chronic chest pain.  He is followed by Dr. Garay.  Patient has had sweating episodes with a normal white blood count, COVID and flu were negative, CTA showed no pneumonia.  Please see note for complete details.    Past medical history significant for arthritis, cervical radiculopathy, degenerative disc disease, diabetes, hyperlipidemia, hypertension, multiple lung nodules, MI, osteomyelitis, pancreatitis, renal disorder, sleep apnea, vitamin D deficiency, coronary artery disease, lung nodules  Differential diagnosis: Vasovagal syncope, syncope related to heat, viral illness, pneumonia, arrhythmia, and other    Labs Reviewed  RESPIRATORY PANEL PCR W/ COVID-19 (SARS-COV-2) [facility], NP [facility] IN [facility], 3-4 HR TAT - Abnormal; Notable for the following components:     Human Rhinovirus/Enterovirus   Detected (*)             All other components within normal limits         Narrative: In the setting of a positive respiratory panel with a viral infection PLUS a negative procalcitonin without other underlying concern for bacterial infection, consider observing off antibiotics or discontinuation of antibiotics and continue supportive care. If the respiratory panel is positive for atypical bacterial infection (Bordetella pertussis, Chlamydophila pneumoniae, or Mycoplasma pneumoniae), consider antibiotic de-escalation to target atypical bacterial infection.  COMPREHENSIVE METABOLIC PANEL - Abnormal; Notable for the following components:     Glucose                       291 (*)                BUN                           35 (*)                 Creatinine                    2.78 (*)               eGFR                          24.2 (*)            All other components within normal limits         Narrative: GFR Normal >60                  Chronic Kidney Disease <60                  Kidney Failure <15                    URINALYSIS W/ MICROSCOPIC IF INDICATED (NO CULTURE) - Abnormal; Notable for the following components:     Glucose, UA                     (*)                  Blood, UA                     Trace (*)               Protein, UA                     (*)               All other components within normal limits  SINGLE HSTROPONIN T - Abnormal; Notable for the following components:     HS Troponin T                 52 (*)              All other components within normal limits         Narrative: High Sensitive Troponin T Reference Range:                  <10.0 ng/L- Negative Female for AMI                  <15.0 ng/L- Negative Male for AMI                  >=10 - Abnormal Female indicating possible myocardial injury.                  >=15 - Abnormal Male indicating possible myocardial injury.                   Clinicians would have to utilize clinical acumen, EKG, Troponin, and serial changes to determine if it is an Acute  "Myocardial Infarction or myocardial injury due to an underlying chronic condition.                                       CBC WITH AUTO DIFFERENTIAL - Abnormal; Notable for the following components:     RBC                           4.05 (*)               Hemoglobin                    11.0 (*)               Hematocrit                    34.7 (*)               RDW                           15.5 (*)               Lymphocyte %                  17.9 (*)               Neutrophils, Absolute         7.27 (*)               Monocytes, Absolute           1.12 (*)            All other components within normal limits  URINALYSIS, MICROSCOPIC ONLY - Abnormal; Notable for the following components:     RBC, UA                       3-5 (*)             All other components within normal limits  POCT GLUCOSE FINGERSTICK - Abnormal; Notable for the following components:     Glucose                       284 (*)             All other components within normal limits  LACTIC ACID, PLASMA - Normal  PROCALCITONIN - Normal         Narrative: As a Marker for Sepsis (Non-Neonates):                                    1. <0.5 ng/mL represents a low risk of severe sepsis and/or septic shock.                  2. >2 ng/mL represents a high risk of severe sepsis and/or septic shock.                                    As a Marker for Lower Respiratory Tract Infections that require antibiotic therapy:                                    PCT on Admission    Antibiotic Therapy       6-12 Hrs later                                    >0.5                Strongly Recommended                  >0.25 - <0.5        Recommended                   0.1 - 0.25          Discouraged              Remeasure/reassess PCT                  <0.1                Strongly Discouraged     Remeasure/reassess PCT                                    As 28 day mortality risk marker: \"Change in Procalcitonin Result\" (>80% or <=80%) if Day 0 (or Day 1) and Day 4 values are available. " Refer to http://[email or web address]-pct-calculator.com                                    Change in PCT <=80%                  A decrease of PCT levels below or equal to 80% defines a positive change in PCT test result representing a higher risk for 28-day all-cause mortality of patients diagnosed with severe sepsis for septic shock.                                    Change in PCT >80%                  A decrease of PCT levels of more than 80% defines a negative change in PCT result representing a lower risk for 28-day all-cause mortality of patients diagnosed with severe sepsis or septic shock.                     BASIC METABOLIC PANEL  CBC (NO DIFF)  POCT GLUCOSE FINGERSTICK  POCT GLUCOSE FINGERSTICK  POCT GLUCOSE FINGERSTICK  POCT GLUCOSE FINGERSTICK  POCT GLUCOSE FINGERSTICK  CBC AND DIFFERENTIAL   XR Chest 1 View   Final Result    . No acute disease.    This report was finalized on 08/28/2023 18:54 by Dr. Phillip Hart MD.     US Carotid Bilateral    (Results Pending)  MRI Brain Without Contrast    (Results Pending)     Patient has an elevated troponin which is comparable to previous troponins in the setting of no chest pain and chronic renal disease.  BUN and creatinine is 35 and 2.78.  Patient did test positive for rhinovirus which would explain his recorded fever.  X-ray was negative for acute findings.  Discussed case with hospitalist who is agreeable for admission regarding his continued syncope.  Wife tells this examiner when he passed out this time he apparently had loss of bladder control.  Urinalysis is negative for infection.  No known history of previous seizures.    Problems Addressed:  Rhinovirus: acute illness or injury  Syncope, unspecified syncope type: acute illness or injury     Details: Acute on chronic    Amount and/or Complexity of Data Reviewed  External Data Reviewed: notes.  Labs: ordered. Decision-making details documented in ED Course.  Radiology: ordered. Decision-making details  documented in ED Course.  ECG/medicine tests:  Decision-making details documented in ED Course.    Risk  Prescription drug management.  Decision regarding hospitalization.        Final diagnoses:   Syncope, unspecified syncope type   Rhinovirus       ED Disposition  ED Disposition       ED Disposition   Decision to Admit    Condition   --    Comment   --               No follow-up provider specified.       Medication List      No changes were made to your prescriptions during this visit.            Neelam Polo, APRN  08/29/23 0034

## 2023-08-29 NOTE — PLAN OF CARE
Goal Outcome Evaluation:  Plan of Care Reviewed With: patient        Progress: no change  Outcome Evaluation: OT evaluation completed.  Pt is A&Ox4.  Pt completed bed mobility with mod I.  He completed functional transfers with CGA and funcitonal mobility in the room with CGA.  Pt did have 1 LOB during functional mobility that he self corrected.  Pt does have some residual LUE weakness from cervical issues (sx at the beginning of the year).  In addition to this, he does demonstrate some significant generalized weakness and impaired endurance.  He reports this has been progressively worse over the last month.  Pt required max A for LB dressing of his socks, however he reports his spouse has had to assist him at home.  He demonstrated no signs of syncope this date with no dizziness or light headedness noted during position changes.  Pt educated on completing slow transitions between position changes to ensure his safety.  OT will cont to follow to maximize his strength, I and safety with ADLs and functional mobility.  It is recommended for pt to discharge home with assist.      Anticipated Discharge Disposition (OT): home with assist

## 2023-08-29 NOTE — THERAPY EVALUATION
Patient Name: Erick Luong  : 1955    MRN: [id number]                              Today's Date: 2023       Admit Date: 2023    Visit Dx:     ICD-10-CM ICD-9-CM   1. Syncope, unspecified syncope type  R55 780.2   2. Rhinovirus  B34.8 079.3     Patient Active Problem List   Diagnosis    Obesity, unspecified obesity severity, unspecified obesity type    Essential hypertension    Type 2 diabetes mellitus with hyperglycemia, with long-term current use of insulin    Nonsmoker    Anemia due to chronic kidney disease    Class 3 severe obesity due to excess calories with body mass index (BMI) of 40.0 to 44.9 in adult    Anasarca    Sleep apnea with use of continuous positive airway pressure (CPAP)    Medically noncompliant    Diabetic ulcer of left midfoot associated with type 2 diabetes mellitus, with fat layer exposed    Diabetic polyneuropathy associated with type 2 diabetes mellitus    Spinal stenosis in cervical region    Degeneration of cervical intervertebral disc    Cervical radiculopathy    Degeneration of lumbar or lumbosacral intervertebral disc    Cervical myelopathy    Bilateral carpal tunnel syndrome    CAD (coronary artery disease)    GERD without esophagitis    BPH without obstruction/lower urinary tract symptoms    Stage 3b chronic kidney disease    Acute renal failure superimposed on stage 4 chronic kidney disease    Chronic diastolic heart failure    Acute urinary retention    Type 2 myocardial infarction due to heart failure    Hypervolemia, unspecified hypervolemia type    Poison ivy dermatitis    Left carpal tunnel syndrome    Chest pain    Syncope and collapse, recurrent episodes    Nausea in adult    Poorly-controlled hypertension    Syncope    Rhinovirus    Peripheral vascular disease     Past Medical History:   Diagnosis Date    Arthritis     Autonomic disease     CAD (coronary artery disease) 2017    Cervical radiculopathy 2021    Chronic constipation with acute  exaccerbation 05/10/2021    Coronary artery disease     Degeneration of cervical intervertebral disc 08/11/2021    Diabetes mellitus     Diabetic foot ulcer 08/31/2020    Diabetic polyneuropathy associated with type 2 diabetes mellitus 01/18/2021    Elevated cholesterol     Gastroesophageal reflux disease 05/13/2019    Headache     HTN (hypertension), benign 05/03/2017    Hyperlipidemia     Hypertension     Mixed hyperlipidemia 02/07/2017    Multiple lung nodules 01/26/2020    5mm, 9 mm RLL identified 1/2020, not present 10/2019.    Myocardial infarction     Osteomyelitis 01/22/2020    Osteomyelitis of fifth toe of right foot 10/07/2019    Pancreatitis     Persistent insomnia 01/20/2020    Renal disorder     Sleep apnea 02/06/2017    Sleep apnea with use of continuous positive airway pressure (CPAP)     NON-COMPLIANT    Slow transit constipation 01/16/2019    Spinal stenosis in cervical region 09/16/2021    Vitamin D deficiency 03/02/2021     Past Surgical History:   Procedure Laterality Date    ABDOMINAL SURGERY      AMPUTATION FOOT / TOE Left 10/2021    5th digit     ANTERIOR CERVICAL DISCECTOMY W/ FUSION N/A 8/5/2022    Procedure: CERVICAL DISCECTOMY ANTERIOR WITH FUSION C5-6 with possible plating of C5-7 with neuromonitoring and 1 c-arm;  Surgeon: Karel Soliz MD;  Location:  [facility] OR;  Service: Neurosurgery;  Laterality: N/A;    APPENDECTOMY      BACK SURGERY      CARDIAC CATHETERIZATION Left 02/08/2021    Procedure: Left Heart Cath w poss intervention left anatomical snuff box acess;  Surgeon: Omkar Charles DO;  Location:  [facility]ATH INVASIVE LOCATION;  Service: Cardiology;  Laterality: Left;    CARDIAC SURGERY      CATARACT EXTRACTION      CERVICAL SPINE SURGERY      COLONOSCOPY N/A 01/31/2017    Normal exam repeat in 5 years    COLONOSCOPY N/A 02/11/2019    Mild acute inflammation    COLONOSCOPY W/ POLYPECTOMY  03/04/2014    Hyperplastic polyp    CORONARY ARTERY BYPASS GRAFT  10/2015     ENDOSCOPY  04/13/2011    Gastritis with hemorrhage    ENDOSCOPY N/A 05/05/2017    Normal exam    ENDOSCOPY N/A 02/11/2019    Gastritis    ENDOSCOPY N/A 09/01/2020    Non-erosive gastritis with hemorrhage    ENDOSCOPY N/A 02/10/2021    Esophagitis    FOOT SURGERY Left     INCISION AND DRAINAGE OF WOUND Left 09/2007    spider bite      General Information       Row Name 08/29/23 1428          OT Time and Intention    Document Type evaluation  Pt presents with increased weakness over the last month, syncope, fever, frequent falls due to syncope, loss of bladder control on last syncopal episode.  Dx: rhinovirus, acute renal failure, syncope  -CS     Mode of Treatment occupational therapy  -CS       Row Name 08/29/23 1428          General Information    Patient Profile Reviewed yes  -CS     Prior Level of Function independent:;all household mobility;community mobility;ADL's;bathing;driving;min assist:;dressing  assist with LB dressing  -CS     Existing Precautions/Restrictions fall  -CS       Row Name 08/29/23 1428          Occupational Profile    Environmental Supports and Barriers (Occupational Profile) tub shower, RWX, grab bars in the shower  -CS       Row Name 08/29/23 1428          Living Environment    People in Home spouse;child(julia), dependent  14 year old home  -CS       Row Name 08/29/23 1428          Home Main Entrance    Number of Stairs, Main Entrance one  -CS     Stair Railings, Main Entrance none  -CS       Row Name 08/29/23 1428          Stairs Within Home, Primary    Number of Stairs, Within Home, Primary none  -CS       Row Name 08/29/23 1428          Cognition    Orientation Status (Cognition) oriented x 4  -CS       Row Name 08/29/23 1428          Safety Issues, Functional Mobility    Impairments Affecting Function (Mobility) strength;endurance/activity tolerance;balance  -CS               User Key  (r) = Recorded By, (t) = Taken By, (c) = Cosigned By      Initials Name Provider Type    DAMARIS Han,  Jodi CHESTER, OTR/L, KEN Occupational Therapist                     Mobility/ADL's       Row Name 08/29/23 1448          Bed Mobility    Bed Mobility supine-sit  -CS     Supine-Sit [place] (Bed Mobility) modified independence  -CS     Assistive Device (Bed Mobility) head of bed elevated  -CS       Row Name 08/29/23 1448          Transfers    Transfers sit-stand transfer;stand-sit transfer  -CS       Row Name 08/29/23 1448          Sit-Stand Transfer    Sit-Stand [place] (Transfers) contact guard  -CS       Row Name 08/29/23 1448          Stand-Sit Transfer    Stand-Sit [place] (Transfers) contact guard  -CS       Row Name 08/29/23 1448          Functional Mobility    Functional Mobility- Ind. Level contact guard assist  -CS     Functional Mobility- Comment Pt walked to bedroom window and back to bedside chair.  He had 1 LOB in which he self corrected.  -CS       Row Name 08/29/23 1448          Activities of Daily Living    BADL Assessment/Intervention lower body dressing  -CS       Row Name 08/29/23 1448          Lower Body Dressing Assessment/Training    [place] Level (Lower Body Dressing) don;doff;socks;maximum assist (25% patient effort)  -CS     Position (Lower Body Dressing) edge of bed sitting  -CS     Comment, (Lower Body Dressing) Pt reports his wife has to assist him at home.  -CS               User Key  (r) = Recorded By, (t) = Taken By, (c) = Cosigned By      Initials Name Provider Type     Jodi Han, OTR/L, KEN Occupational Therapist                   Obj/Interventions       Row Name 08/29/23 1449          Sensory Assessment (Somatosensory)    Sensory Assessment (Somatosensory) UE sensation intact  Pt reports chronic numbness in tang hands, feet.  He has a carpal tunnel sx planned for next month.  -CS       Row Name 08/29/23 1449          Vision Assessment/Intervention    Visual Impairment/Limitations WFL  -CS       Row Name 08/29/23 1449          Range of Motion Comprehensive     General Range of Motion bilateral upper extremity ROM WFL  -CS       Row Name 08/29/23 1449          Strength Comprehensive (MMT)    Comment, General Manual Muscle Testing (MMT) Assessment RUE strength: 4+/5, LUE: 4/5 (Pt reports he has residual LUE weakness from deficits from prior to cervical sx at the beginning of the year.)  -CS       Row Name 08/29/23 1449          Balance    Balance Assessment sitting static balance;sitting dynamic balance;standing static balance;standing dynamic balance  -CS     Static Sitting Balance independent  -CS     Dynamic Sitting Balance independent  -CS     Position, Sitting Balance sitting edge of bed  -CS     Static Standing Balance standby assist;verbal cues  -CS     Dynamic Standing Balance standby assist;contact guard;verbal cues  -CS               User Key  (r) = Recorded By, (t) = Taken By, (c) = Cosigned By      Initials Name Provider Type    CS Jodi Han S, OTR/L, CNT Occupational Therapist                   Goals/Plan       Row Name 08/29/23 1526          Transfer Goal 1 (OT)    Activity/Assistive Device (Transfer Goal 1, OT) tub  -CS     [place] Level/Cues Needed (Transfer Goal 1, OT) modified independence  -CS     Time Frame (Transfer Goal 1, OT) long term goal (LTG)  -CS     Progress/Outcome (Transfer Goal 1, OT) new goal  -CS       Row Name 08/29/23 1526          Bathing Goal 1 (OT)    Activity/Device (Bathing Goal 1, OT) bathing skills, all  -CS     [place] Level/Cues Needed (Bathing Goal 1, OT) modified independence  -CS     Time Frame (Bathing Goal 1, OT) long term goal (LTG)  -CS     Progress/Outcomes (Bathing Goal 1, OT) new goal  -CS       Row Name 08/29/23 1526          Strength Goal 1 (OT)    Strength Goal 1 (OT) Pt will be I with BUE strengthening HEP.  -CS     Time Frame (Strength Goal 1, OT) long term goal (LTG)  -CS     Progress/Outcome (Strength Goal 1, OT) new goal  -CS       Row Name 08/29/23 1526          Therapy Assessment/Plan (OT)     Planned Therapy Interventions (OT) activity tolerance training;BADL retraining;adaptive equipment training;transfer/mobility retraining;strengthening exercise;ROM/therapeutic exercise;patient/caregiver education/training;occupation/activity based interventions;functional balance retraining  -CS               User Key  (r) = Recorded By, (t) = Taken By, (c) = Cosigned By      Initials Name Provider Type    CS Jodi Han S, OTR/L, CNT Occupational Therapist                   Clinical Impression       Row Name 08/29/23 1428          Pain Assessment    Pretreatment Pain Rating 5/10  Pt has hx of neck sx at the first of the year and lives with chronic pain however his falls have exacerbated his pain.  -CS     Posttreatment Pain Rating 5/10  -CS     Pain Intervention(s) Medication (See MAR);Repositioned;Ambulation/increased activity  -CS       Row Name 08/29/23 5768          Plan of Care Review    Plan of Care Reviewed With patient  -CS     Progress no change  -CS     Outcome Evaluation OT evaluation completed.  Pt is A&Ox4.  Pt completed bed mobility with mod I.  He completed functional transfers with CGA and funcitonal mobility in the room with CGA.  Pt did have 1 LOB during functional mobility that he self corrected.  Pt does have some residual LUE weakness from cervical issues (sx at the beginning of the year).  In addition to this, he does demonstrate some significant generalized weakness and impaired endurance.  He reports this has been progressively worse over the last month.  Pt required max A for LB dressing of his socks, however he reports his spouse has had to assist him at home.  He demonstrated no signs of syncope this date with no dizziness or light headedness noted during position changes.  Pt educated on completing slow transitions between position changes to ensure his safety.  OT will cont to follow to maximize his strength, I and safety with ADLs and functional mobility.  It is recommended for pt to  discharge home with assist.  -CS       Row Name 08/29/23 1428          Therapy Assessment/Plan (OT)    Patient/Family Therapy Goal Statement (OT) to go home  -CS     Rehab Potential (OT) good, to achieve stated therapy goals  -CS     Criteria for Skilled Therapeutic Interventions Met (OT) yes;skilled treatment is necessary  -CS     Therapy Frequency (OT) 3 times/wk  -CS     Predicted Duration of Therapy Intervention (OT) until hospital discharge  -CS       Row Name 08/29/23 1428          Therapy Plan Review/Discharge Plan (OT)    Anticipated Discharge Disposition (OT) home with assist  -CS       Row Name 08/29/23 1428          Vital Signs    Pre SpO2 (%) 100  -CS     O2 Delivery Pre Treatment room air  -CS     Post SpO2 (%) 100  -CS     O2 Delivery Post Treatment room air  -CS     Pre Patient Position Supine  -CS       Row Name 08/29/23 1428          Positioning and Restraints    Pre-Treatment Position in bed  -CS     Post Treatment Position chair  -CS     In Chair sitting;call light within reach;encouraged to call for assist;notified nsg  -CS               User Key  (r) = Recorded By, (t) = Taken By, (c) = Cosigned By      Initials Name Provider Type    CS Jodi Han, OTR/L, CNT Occupational Therapist                   Outcome Measures       Row Name 08/29/23 1428          How much help from another is currently needed...    Putting on and taking off regular lower body clothing? 3  -CS     Bathing (including washing, rinsing, and drying) 3  -CS     Toileting (which includes using toilet bed pan or urinal) 3  -CS     Putting on and taking off regular upper body clothing 4  -CS     Taking care of personal grooming (such as brushing teeth) 4  -CS     Eating meals 4  -CS     AM-PAC 6 Clicks Score (OT) 21  -CS       Row Name 08/29/23 1336 08/29/23 0824       How much help from another person do you currently need...    Turning from your back to your side while in flat bed without using bedrails? 4  -CB 4  -AO     Moving from lying on back to sitting on the side of a flat bed without bedrails? 3  -CB 3  -AO    Moving to and from a bed to a chair (including a wheelchair)? 3  -CB 3  -AO    Standing up from a chair using your arms (e.g., wheelchair, bedside chair)? 3  -CB 3  -AO    Climbing 3-5 steps with a railing? 2  -CB 2  -AO    To walk in hospital room? 3  -CB 3  -AO    AM-PAC 6 Clicks Score (PT) 18  -CB 18  -AO    Highest level of mobility 6 --> Walked 10 steps or more  -CB 6 --> Walked 10 steps or more  -AO      Row Name 08/29/23 1428          Functional Assessment    Outcome Measure Options AM-PAC 6 Clicks Daily Activity (OT)  -CS               User Key  (r) = Recorded By, (t) = Taken By, (c) = Cosigned By      Initials Name Provider Type    CS Jodi Han, OTR/L, CNT Occupational Therapist    Cristal Fleming, LPN Licensed Nurse    Mae Marshall, RN Registered Nurse                    Occupational Therapy Education       Title: PT OT SLP Therapies (Done)       Topic: Occupational Therapy (Done)       Point: ADL training (Done)       Description:   Instruct learner(s) on proper safety adaptation and remediation techniques during self care or transfers.   Instruct in proper use of assistive devices.                  Learning Progress Summary             Patient Acceptance, E, VU,NR by CS at 8/29/2023 1528                         Point: Home exercise program (Done)       Description:   Instruct learner(s) on appropriate technique for monitoring, assisting and/or progressing therapeutic exercises/activities.                  Learning Progress Summary             Patient Acceptance, E, VU,NR by CS at 8/29/2023 1528                         Point: Precautions (Done)       Description:   Instruct learner(s) on prescribed precautions during self-care and functional transfers.                  Learning Progress Summary             Patient Acceptance, E, VU,NR by  at 8/29/2023 1528                         Point:  Body mechanics (Done)       Description:   Instruct learner(s) on proper positioning and spine alignment during self-care, functional mobility activities and/or exercises.                  Learning Progress Summary             Patient Acceptance, E, VU,NR by DAMARIS at 8/29/2023 1528                                         User Key       Initials Effective Dates Name Provider Type Discipline    DAMARIS 02/03/23 -  Jodi Han S, OTR/L, CNT Occupational Therapist OT                  OT Recommendation and Plan  Planned Therapy Interventions (OT): activity tolerance training, BADL retraining, adaptive equipment training, transfer/mobility retraining, strengthening exercise, ROM/therapeutic exercise, patient/caregiver education/training, occupation/activity based interventions, functional balance retraining  Therapy Frequency (OT): 3 times/wk  Plan of Care Review  Plan of Care Reviewed With: patient  Progress: no change  Outcome Evaluation: OT evaluation completed.  Pt is A&Ox4.  Pt completed bed mobility with mod I.  He completed functional transfers with CGA and funcitonal mobility in the room with CGA.  Pt did have 1 LOB during functional mobility that he self corrected.  Pt does have some residual LUE weakness from cervical issues (sx at the beginning of the year).  In addition to this, he does demonstrate some significant generalized weakness and impaired endurance.  He reports this has been progressively worse over the last month.  Pt required max A for LB dressing of his socks, however he reports his spouse has had to assist him at home.  He demonstrated no signs of syncope this date with no dizziness or light headedness noted during position changes.  Pt educated on completing slow transitions between position changes to ensure his safety.  OT will cont to follow to maximize his strength, I and safety with ADLs and functional mobility.  It is recommended for pt to discharge home with assist.     Time Calculation:          Time Calculation- OT       Row Name 08/29/23 1527             Time Calculation- OT    OT Start Time 1428  -CS      OT Stop Time 1510  -CS      OT Time Calculation (min) 42 min  -CS      OT Received On 08/29/23  -CS      OT Goal Re-Cert Due Date 09/08/23  -CS         Untimed Charges    OT Eval/Re-eval Minutes 42  -CS         Total Minutes    Untimed Charges Total Minutes 42  -CS       Total Minutes 42  -CS                User Key  (r) = Recorded By, (t) = Taken By, (c) = Cosigned By      Initials Name Provider Type    CS Jodi Han OTR/L, KEN Occupational Therapist                  Therapy Charges for Today       Code Description Service Date Service Provider Modifiers Qty    30851073618 HC OT EVAL MOD COMPLEXITY 3 8/29/2023 Jodi Han OTR/L, CNT GO 1                 ALEXANDER Haywood/L, CNT  8/29/2023

## 2023-08-29 NOTE — PLAN OF CARE
Goal Outcome Evaluation: Patient admitted from ER with complaints of syncope and fever. Patient has had multiple syncopal episodes recently. Patient had a temp of 102 yesterday. He is afebrile at this time. Complaints of back pain at a 6 on scale of 1-10. Patient safety to be maintained this shift, continue to monitor and report abnormal to provider.

## 2023-08-30 NOTE — THERAPY DISCHARGE NOTE
Acute Care - Occupational Therapy Discharge Summary  [facility]     Patient Name: Erick Luong  : 1955  MRN: [id number]    Today's Date: 2023                 Admit Date: 2023        OT Recommendation and Plan    Visit Dx:    ICD-10-CM ICD-9-CM   1. Syncope, unspecified syncope type  R55 780.2   2. Rhinovirus  B34.8 079.3                OT Rehab Goals       Row Name 23 1100             Transfer Goal 1 (OT)    Activity/Assistive Device (Transfer Goal 1, OT) tub  -JJ      [place] Level/Cues Needed (Transfer Goal 1, OT) modified independence  -JJ      Time Frame (Transfer Goal 1, OT) long term goal (LTG)  -JJ      Progress/Outcome (Transfer Goal 1, OT) goal partially met;discharged from facility  -JJ         Bathing Goal 1 (OT)    Activity/Device (Bathing Goal 1, OT) bathing skills, all  -JJ      [place] Level/Cues Needed (Bathing Goal 1, OT) modified independence  -JJ      Time Frame (Bathing Goal 1, OT) long term goal (LTG)  -JJ      Progress/Outcomes (Bathing Goal 1, OT) goal not met;discharged from facility  -JJ         Strength Goal 1 (OT)    Strength Goal 1 (OT) Pt will be I with BUE strengthening HEP.  -JJ      Time Frame (Strength Goal 1, OT) long term goal (LTG)  -JJ      Progress/Outcome (Strength Goal 1, OT) goal partially met;discharged from facility  -JJ                User Key  (r) = Recorded By, (t) = Taken By, (c) = Cosigned By      Initials Name Provider Type Discipline    Yuliana Galvan OTR/L, CSRS Occupational Therapist OT                                OT Discharge Summary  Anticipated Discharge Disposition (OT): home with assist  Reason for Discharge: Discharge from facility  Outcomes Achieved: Refer to plan of care for updates on goals achieved  Discharge Destination: Home with assist      ALEXANDER Moon/EDVIN, MESHAS  2023

## 2023-08-30 NOTE — OUTREACH NOTE
Prep Survey      Flowsheet Row Responses   [facility] facility patient discharged from? [place]   Is LACE score < 7 ? No   Eligibility Readm Mgmt   Discharge diagnosis Syncope and collapse   Does the patient have one of the following disease processes/diagnoses(primary or secondary)? Other   Does the patient have Home health ordered? No   Is there a DME ordered? No   Prep survey completed? Yes            Lesley PERES - Registered Nurse

## 2023-08-30 NOTE — OUTREACH NOTE
Medical Week 1 Survey      Flowsheet Row Responses   [facility] patient discharged from? [place]   Does the patient have one of the following disease processes/diagnoses(primary or secondary)? Other   Week 1 attempt successful? No   Unsuccessful attempts Attempt 1   Revoke Readmitted            Lesley PERES - Registered Nurse

## 2023-09-05 ENCOUNTER — TELEPHONE (OUTPATIENT)
Dept: VASCULAR SURGERY | Facility: CLINIC | Age: 68
End: 2023-09-05
Payer: COMMERCIAL

## 2023-09-05 RX ORDER — MIDODRINE HYDROCHLORIDE 10 MG/1
10 TABLET ORAL 3 TIMES DAILY
Qty: 270 TABLET | Refills: 4 | Status: CANCELLED | OUTPATIENT
Start: 2023-09-05

## 2023-09-05 NOTE — TELEPHONE ENCOUNTER
CALLED PT ATTEMPTING TO RESCHEDULE MISSED APPOINTMENT WITH [facility]. NO ANSWER. LEFT VM TO RETURN CALL.

## 2023-09-08 ENCOUNTER — READMISSION MANAGEMENT (OUTPATIENT)
Dept: CALL CENTER | Facility: HOSPITAL | Age: 68
End: 2023-09-08
Payer: COMMERCIAL

## 2023-09-08 NOTE — OUTREACH NOTE
Medical Week 2 Survey      Flowsheet Row Responses   [facility] facility patient discharged from? [place]   Does the patient have one of the following disease processes/diagnoses(primary or secondary)? Other   Week 2 attempt successful? No   Unsuccessful attempts Attempt 1            Anthony MITCHELL - Registered Nurse

## 2023-09-10 RX ORDER — MIDODRINE HYDROCHLORIDE 10 MG/1
10 TABLET ORAL 3 TIMES DAILY
Qty: 270 TABLET | Refills: 4 | Status: SHIPPED | OUTPATIENT
Start: 2023-09-10

## 2023-09-11 ENCOUNTER — TELEPHONE (OUTPATIENT)
Dept: NEUROSURGERY | Facility: CLINIC | Age: 68
End: 2023-09-11
Payer: COMMERCIAL

## 2023-09-11 NOTE — TELEPHONE ENCOUNTER
Left message for pt wife that pt did not show for his pre op testing.  If she calls back she will need to be transferred to surgery scheduling at [phone] and confirm that he goes to his appt tomorrow 9/12 @ 3pm with dr Shetty for clearance.

## 2023-09-12 ENCOUNTER — TELEPHONE (OUTPATIENT)
Dept: NEUROSURGERY | Facility: CLINIC | Age: 68
End: 2023-09-12
Payer: COMMERCIAL

## 2023-09-12 NOTE — TELEPHONE ENCOUNTER
PATIENT WIFE ZAINAB RETURNED CALL REGARDING HIS MISSED PRE OP TESTING.     PT IS CURRENTLY INPATIENT AT [facility] WITH KIDNEY AND HEART PROBLEMS. HE HAS HAD TO HAVE EMERGENCY SURGERY. AT THIS POINT THEY DO NOT WANT TO PROCEED WITH CARPAL TUNNEL SURGERY. THEY WILL CALL US BACK WHEN PATIENT HAS BEEN CLEARED TO PROCEED.     CANCELLED SURGERY AND UPDATED DR WOOD AND GAVIOTA ESPINOZA CMA.

## 2023-09-13 ENCOUNTER — READMISSION MANAGEMENT (OUTPATIENT)
Dept: CALL CENTER | Facility: HOSPITAL | Age: 68
End: 2023-09-13
Payer: COMMERCIAL

## 2023-09-13 NOTE — OUTREACH NOTE
2021        Carlyle Brianna       : 2008  [address] [place] [address]        To Whom It May Concern,    This is to certify that Carlyle was seen in the clinic on 2021.     Restrictions for activity: n/a  Restrictions  as of n/a        SIGNATURE:_________________________________________, 2021     Danyelle Cagle MD                                      No image results found.    [address] [place] [id number] [phone]   Medical Week 2 Survey      Flowsheet Row Responses   [facility] facility patient discharged from? [place]   Does the patient have one of the following disease processes/diagnoses(primary or secondary)? Other   Week 2 attempt successful? No   Unsuccessful attempts Attempt 2            Claudia HARDIN - Registered Nurse

## 2023-09-18 ENCOUNTER — HOSPITAL ENCOUNTER (OUTPATIENT)
Facility: HOSPITAL | Age: 68
Discharge: NURSING, REHAB OR LONG-TERM CARE | End: 2023-10-11
Attending: INTERNAL MEDICINE | Admitting: INTERNAL MEDICINE
Payer: COMMERCIAL

## 2023-09-18 LAB — GLUCOSE BLDC GLUCOMTR-MCNC: 275 MG/DL (ref 70–130)

## 2023-09-18 PROCEDURE — 82948 REAGENT STRIP/BLOOD GLUCOSE: CPT

## 2023-09-18 PROCEDURE — 63710000001 INSULIN LISPRO (HUMAN) PER 5 UNITS: Performed by: INTERNAL MEDICINE

## 2023-09-18 RX ORDER — INSULIN LISPRO 100 [IU]/ML
2-9 INJECTION, SOLUTION INTRAVENOUS; SUBCUTANEOUS
Status: DISCONTINUED | OUTPATIENT
Start: 2023-09-18 | End: 2023-10-11 | Stop reason: HOSPADM

## 2023-09-18 RX ORDER — NICOTINE POLACRILEX 4 MG
15 LOZENGE BUCCAL
Status: DISCONTINUED | OUTPATIENT
Start: 2023-09-18 | End: 2023-10-11 | Stop reason: HOSPADM

## 2023-09-18 RX ORDER — MELATONIN
5000 DAILY
Status: DISCONTINUED | OUTPATIENT
Start: 2023-09-19 | End: 2023-10-11 | Stop reason: HOSPADM

## 2023-09-18 RX ORDER — BUMETANIDE 1 MG/1
1 TABLET ORAL DAILY
Status: DISCONTINUED | OUTPATIENT
Start: 2023-09-19 | End: 2023-09-23

## 2023-09-18 RX ORDER — TAMSULOSIN HYDROCHLORIDE 0.4 MG/1
0.4 CAPSULE ORAL DAILY
Status: DISCONTINUED | OUTPATIENT
Start: 2023-09-19 | End: 2023-10-11 | Stop reason: HOSPADM

## 2023-09-18 RX ORDER — SODIUM HYPOCHLORITE 1.25 MG/ML
SOLUTION TOPICAL EVERY 8 HOURS SCHEDULED
Status: DISCONTINUED | OUTPATIENT
Start: 2023-09-18 | End: 2023-09-22

## 2023-09-18 RX ORDER — ACETAMINOPHEN 325 MG/1
650 TABLET ORAL EVERY 4 HOURS PRN
Status: DISCONTINUED | OUTPATIENT
Start: 2023-09-18 | End: 2023-10-11 | Stop reason: HOSPADM

## 2023-09-18 RX ORDER — CARVEDILOL 3.12 MG/1
3.12 TABLET ORAL 2 TIMES DAILY WITH MEALS
Status: DISCONTINUED | OUTPATIENT
Start: 2023-09-18 | End: 2023-09-18 | Stop reason: SDUPTHER

## 2023-09-18 RX ORDER — DEXTROSE MONOHYDRATE 25 G/50ML
25 INJECTION, SOLUTION INTRAVENOUS
Status: DISCONTINUED | OUTPATIENT
Start: 2023-09-18 | End: 2023-10-11 | Stop reason: HOSPADM

## 2023-09-18 RX ORDER — ALUMINA, MAGNESIA, AND SIMETHICONE 2400; 2400; 240 MG/30ML; MG/30ML; MG/30ML
30 SUSPENSION ORAL EVERY 4 HOURS PRN
Status: DISCONTINUED | OUTPATIENT
Start: 2023-09-18 | End: 2023-10-11 | Stop reason: HOSPADM

## 2023-09-18 RX ORDER — POLYETHYLENE GLYCOL 3350 17 G/17G
17 POWDER, FOR SOLUTION ORAL DAILY
Status: DISCONTINUED | OUTPATIENT
Start: 2023-09-19 | End: 2023-10-11 | Stop reason: HOSPADM

## 2023-09-18 RX ORDER — PREGABALIN 100 MG/1
100 CAPSULE ORAL DAILY
Status: DISCONTINUED | OUTPATIENT
Start: 2023-09-19 | End: 2023-10-10

## 2023-09-18 RX ORDER — SODIUM CHLORIDE 0.9 % (FLUSH) 0.9 %
10 SYRINGE (ML) INJECTION AS NEEDED
Status: DISCONTINUED | OUTPATIENT
Start: 2023-09-18 | End: 2023-10-11 | Stop reason: HOSPADM

## 2023-09-18 RX ORDER — OXYCODONE HCL 10 MG/1
10 TABLET, FILM COATED, EXTENDED RELEASE ORAL EVERY 12 HOURS SCHEDULED
Status: DISCONTINUED | OUTPATIENT
Start: 2023-09-18 | End: 2023-09-24

## 2023-09-18 RX ORDER — BUSPIRONE HYDROCHLORIDE 10 MG/1
10 TABLET ORAL EVERY 12 HOURS PRN
Status: DISCONTINUED | OUTPATIENT
Start: 2023-09-18 | End: 2023-10-11 | Stop reason: HOSPADM

## 2023-09-18 RX ORDER — ROSUVASTATIN CALCIUM 10 MG/1
40 TABLET, COATED ORAL NIGHTLY
Status: DISCONTINUED | OUTPATIENT
Start: 2023-09-18 | End: 2023-10-10

## 2023-09-18 RX ORDER — PANTOPRAZOLE SODIUM 40 MG/1
40 TABLET, DELAYED RELEASE ORAL 2 TIMES DAILY
Status: DISCONTINUED | OUTPATIENT
Start: 2023-09-18 | End: 2023-10-11 | Stop reason: HOSPADM

## 2023-09-18 RX ORDER — ONDANSETRON 2 MG/ML
4 INJECTION INTRAMUSCULAR; INTRAVENOUS EVERY 6 HOURS PRN
Status: DISCONTINUED | OUTPATIENT
Start: 2023-09-18 | End: 2023-10-11 | Stop reason: HOSPADM

## 2023-09-18 RX ORDER — SODIUM CHLORIDE 0.9 % (FLUSH) 0.9 %
10 SYRINGE (ML) INJECTION EVERY 12 HOURS SCHEDULED
Status: DISCONTINUED | OUTPATIENT
Start: 2023-09-18 | End: 2023-10-11 | Stop reason: HOSPADM

## 2023-09-18 RX ORDER — OXYCODONE HYDROCHLORIDE 5 MG/1
5 TABLET ORAL EVERY 4 HOURS PRN
Status: DISCONTINUED | OUTPATIENT
Start: 2023-09-18 | End: 2023-09-24

## 2023-09-18 RX ORDER — DOCUSATE SODIUM 100 MG/1
200 CAPSULE, LIQUID FILLED ORAL 2 TIMES DAILY
Status: DISCONTINUED | OUTPATIENT
Start: 2023-09-18 | End: 2023-10-11 | Stop reason: HOSPADM

## 2023-09-18 RX ORDER — SUCRALFATE 1 G/1
1 TABLET ORAL
Status: DISCONTINUED | OUTPATIENT
Start: 2023-09-19 | End: 2023-10-11 | Stop reason: HOSPADM

## 2023-09-18 RX ORDER — DONEPEZIL HYDROCHLORIDE 10 MG/1
10 TABLET, FILM COATED ORAL DAILY
Status: DISCONTINUED | OUTPATIENT
Start: 2023-09-19 | End: 2023-10-10

## 2023-09-18 RX ORDER — AMOXICILLIN 250 MG
1 CAPSULE ORAL NIGHTLY
Status: DISCONTINUED | OUTPATIENT
Start: 2023-09-18 | End: 2023-10-11 | Stop reason: HOSPADM

## 2023-09-18 RX ORDER — VALSARTAN 40 MG/1
40 TABLET ORAL NIGHTLY
Status: DISCONTINUED | OUTPATIENT
Start: 2023-09-18 | End: 2023-10-11 | Stop reason: HOSPADM

## 2023-09-18 RX ORDER — PREGABALIN 100 MG/1
200 CAPSULE ORAL NIGHTLY
Status: DISCONTINUED | OUTPATIENT
Start: 2023-09-18 | End: 2023-10-10

## 2023-09-18 RX ORDER — CARVEDILOL 3.12 MG/1
3.12 TABLET ORAL 2 TIMES DAILY WITH MEALS
Status: DISCONTINUED | OUTPATIENT
Start: 2023-09-19 | End: 2023-10-11 | Stop reason: HOSPADM

## 2023-09-18 RX ORDER — CALCITRIOL 0.25 UG/1
0.5 CAPSULE, LIQUID FILLED ORAL DAILY
Status: DISCONTINUED | OUTPATIENT
Start: 2023-09-19 | End: 2023-10-11 | Stop reason: HOSPADM

## 2023-09-18 RX ORDER — ACETAMINOPHEN 650 MG/1
650 SUPPOSITORY RECTAL EVERY 4 HOURS PRN
Status: DISCONTINUED | OUTPATIENT
Start: 2023-09-18 | End: 2023-10-11 | Stop reason: HOSPADM

## 2023-09-18 RX ORDER — METOCLOPRAMIDE 5 MG/1
10 TABLET ORAL
Status: DISCONTINUED | OUTPATIENT
Start: 2023-09-19 | End: 2023-09-22

## 2023-09-18 RX ORDER — ONDANSETRON 4 MG/1
4 TABLET, FILM COATED ORAL EVERY 6 HOURS PRN
Status: DISCONTINUED | OUTPATIENT
Start: 2023-09-18 | End: 2023-10-11 | Stop reason: HOSPADM

## 2023-09-18 RX ORDER — SACCHAROMYCES BOULARDII 250 MG
250 CAPSULE ORAL 2 TIMES DAILY
Status: DISCONTINUED | OUTPATIENT
Start: 2023-09-18 | End: 2023-10-11 | Stop reason: HOSPADM

## 2023-09-19 ENCOUNTER — READMISSION MANAGEMENT (OUTPATIENT)
Dept: CALL CENTER | Facility: HOSPITAL | Age: 68
End: 2023-09-19
Payer: COMMERCIAL

## 2023-09-19 LAB
ALBUMIN SERPL-MCNC: 2.9 G/DL (ref 3.5–5.2)
ALBUMIN/GLOB SERPL: 0.7 G/DL
ALP SERPL-CCNC: 106 U/L (ref 39–117)
ALT SERPL W P-5'-P-CCNC: 9 U/L (ref 1–41)
ANION GAP SERPL CALCULATED.3IONS-SCNC: 12 MMOL/L (ref 5–15)
AST SERPL-CCNC: 12 U/L (ref 1–40)
BASOPHILS # BLD AUTO: 0.12 10*3/MM3 (ref 0–0.2)
BASOPHILS NFR BLD AUTO: 1.1 % (ref 0–1.5)
BILIRUB SERPL-MCNC: 0.2 MG/DL (ref 0–1.2)
BUN SERPL-MCNC: 23 MG/DL (ref 8–23)
BUN/CREAT SERPL: 12.3 (ref 7–25)
CALCIUM SPEC-SCNC: 9.2 MG/DL (ref 8.6–10.5)
CHLORIDE SERPL-SCNC: 105 MMOL/L (ref 98–107)
CO2 SERPL-SCNC: 23 MMOL/L (ref 22–29)
CREAT SERPL-MCNC: 1.87 MG/DL (ref 0.76–1.27)
DEPRECATED RDW RBC AUTO: 51.2 FL (ref 37–54)
EGFRCR SERPLBLD CKD-EPI 2021: 38.9 ML/MIN/1.73
EOSINOPHIL # BLD AUTO: 0.48 10*3/MM3 (ref 0–0.4)
EOSINOPHIL NFR BLD AUTO: 4.4 % (ref 0.3–6.2)
ERYTHROCYTE [DISTWIDTH] IN BLOOD BY AUTOMATED COUNT: 15.7 % (ref 12.3–15.4)
GEN 5 2HR TROPONIN T REFLEX: 53 NG/L
GLOBULIN UR ELPH-MCNC: 4 GM/DL
GLUCOSE BLDC GLUCOMTR-MCNC: 155 MG/DL (ref 70–130)
GLUCOSE BLDC GLUCOMTR-MCNC: 216 MG/DL (ref 70–130)
GLUCOSE BLDC GLUCOMTR-MCNC: 233 MG/DL (ref 70–130)
GLUCOSE BLDC GLUCOMTR-MCNC: 291 MG/DL (ref 70–130)
GLUCOSE SERPL-MCNC: 112 MG/DL (ref 65–99)
HCT VFR BLD AUTO: 30.2 % (ref 37.5–51)
HGB BLD-MCNC: 8.9 G/DL (ref 13–17.7)
IMM GRANULOCYTES # BLD AUTO: 0.04 10*3/MM3 (ref 0–0.05)
IMM GRANULOCYTES NFR BLD AUTO: 0.4 % (ref 0–0.5)
LYMPHOCYTES # BLD AUTO: 2.16 10*3/MM3 (ref 0.7–3.1)
LYMPHOCYTES NFR BLD AUTO: 19.9 % (ref 19.6–45.3)
MCH RBC QN AUTO: 26.3 PG (ref 26.6–33)
MCHC RBC AUTO-ENTMCNC: 29.5 G/DL (ref 31.5–35.7)
MCV RBC AUTO: 89.3 FL (ref 79–97)
MONOCYTES # BLD AUTO: 0.81 10*3/MM3 (ref 0.1–0.9)
MONOCYTES NFR BLD AUTO: 7.5 % (ref 5–12)
NEUTROPHILS NFR BLD AUTO: 66.7 % (ref 42.7–76)
NEUTROPHILS NFR BLD AUTO: 7.25 10*3/MM3 (ref 1.7–7)
NRBC BLD AUTO-RTO: 0 /100 WBC (ref 0–0.2)
PLATELET # BLD AUTO: 381 10*3/MM3 (ref 140–450)
PMV BLD AUTO: 11.5 FL (ref 6–12)
POTASSIUM SERPL-SCNC: 4.3 MMOL/L (ref 3.5–5.2)
PREALB SERPL-MCNC: 12.7 MG/DL (ref 20–40)
PROT SERPL-MCNC: 6.9 G/DL (ref 6–8.5)
RBC # BLD AUTO: 3.38 10*6/MM3 (ref 4.14–5.8)
SODIUM SERPL-SCNC: 140 MMOL/L (ref 136–145)
TROPONIN T DELTA: 1 NG/L
TROPONIN T SERPL HS-MCNC: 52 NG/L
WBC NRBC COR # BLD: 10.86 10*3/MM3 (ref 3.4–10.8)

## 2023-09-19 PROCEDURE — 25010000002 HYDROMORPHONE 1 MG/ML SOLUTION: Performed by: INTERNAL MEDICINE

## 2023-09-19 PROCEDURE — 84484 ASSAY OF TROPONIN QUANT: CPT | Performed by: INTERNAL MEDICINE

## 2023-09-19 PROCEDURE — 25010000002 ONDANSETRON PER 1 MG: Performed by: INTERNAL MEDICINE

## 2023-09-19 PROCEDURE — 63710000001 INSULIN DETEMIR PER 5 UNITS: Performed by: INTERNAL MEDICINE

## 2023-09-19 PROCEDURE — 92523 SPEECH SOUND LANG COMPREHEN: CPT

## 2023-09-19 PROCEDURE — 63710000001 INSULIN LISPRO (HUMAN) PER 5 UNITS: Performed by: INTERNAL MEDICINE

## 2023-09-19 PROCEDURE — 25010000002 CEFTRIAXONE PER 250 MG: Performed by: INTERNAL MEDICINE

## 2023-09-19 PROCEDURE — 92610 EVALUATE SWALLOWING FUNCTION: CPT

## 2023-09-19 PROCEDURE — 84134 ASSAY OF PREALBUMIN: CPT | Performed by: INTERNAL MEDICINE

## 2023-09-19 PROCEDURE — 85025 COMPLETE CBC W/AUTO DIFF WBC: CPT | Performed by: INTERNAL MEDICINE

## 2023-09-19 PROCEDURE — 82948 REAGENT STRIP/BLOOD GLUCOSE: CPT

## 2023-09-19 PROCEDURE — 80053 COMPREHEN METABOLIC PANEL: CPT | Performed by: INTERNAL MEDICINE

## 2023-09-19 NOTE — PROGRESS NOTES
[facility] Fall Risk Assessment Note    Name: Erick Luong  MRN: [id number]  [facility]: [id number]  Admit Date/Time: 9/18/2023  6:30 PM.    Currently ordered medications associated with an increased risk for fall include:    Scheduled Meds:  bumetanide, 1 mg, Oral, Daily  carvedilol, 3.125 mg, Oral, BID With Meals  donepezil, 10 mg, Oral, Daily  Dulaglutide, 4.5 mg, Subcutaneous, Weekly  empagliflozin, 25 mg, Oral, Daily  insulin detemir, 20 Units, Subcutaneous, Daily  insulin lispro, 2-9 Units, Subcutaneous, 4x Daily AC & at Bedtime  metoclopramide, 10 mg, Oral, TID AC  oxyCODONE, 10 mg, Oral, Q12H  polyethylene glycol, 17 g, Oral, Daily  pregabalin, 100 mg, Oral, Daily  pregabalin, 200 mg, Oral, Nightly  senna-docusate sodium, 1 tablet, Oral, Nightly  tamsulosin, 0.4 mg, Oral, Daily  valsartan, 40 mg, Oral, Nightly    PRN Meds:    busPIRone    HYDROmorphone    magnesium hydroxide    ondansetron     oxyCODONE    Felice Dixon, Robert  09/19/23 08:20 CDT

## 2023-09-19 NOTE — CONSULTS
Adult Nutrition  Assessment/PES    Patient Name:  Erick Luong  YOB: 1955  MRN: [id number]  Admit Date:  9/18/2023    Assessment Date:  9/19/2023       Reason for Assessment       Row Name 09/19/23 1524          Reason for Assessment    Reason For Assessment physician consult;per organizational policy;other (see comments)  LTACH     Diagnosis diabetes diagnosis/complications;infection/sepsis     Identified At Risk by Screening Criteria large or nonhealing wound, burn or pressure injury                    Nutrition/Diet History       Row Name 09/19/23 1524          Nutrition/Diet History    Typical Intake (Food/Fluid/EN/PN) Pt denied food allergies. Menu provided. Obtained food preferences. Pt and RD discussed MNT for gastroparesis. Pt recently started Reglan and Carafate. Pt admit wt 315lb.                    Labs/Tests/Procedures/Meds       Row Name 09/19/23 1525          Labs/Procedures/Meds    Lab Results Reviewed reviewed     Lab Results Comments Glu 112-275        Diagnostic Tests/Procedures    Diagnostic Test/Procedure Reviewed reviewed        Medications    Pertinent Medications Reviewed reviewed     Pertinent Medications Comments See MAR                    Physical Findings       Row Name 09/19/23 1525          Physical Findings    Overall Physical Appearance Room air, left foot wound, BM 9/17                    Estimated/Assessed Needs - Anthropometrics       Row Name 09/19/23 1527          Anthropometrics    Weight for Calculation 143 kg (315 lb)        Estimated/Assessed Needs    Additional Documentation Fluid Requirements (Group);KCAL/KG (Group);Protein Requirements (Group)        KCAL/KG    KCAL/KG 14 Kcal/Kg (kcal)     14 Kcal/Kg (kcal) 2000.362        Protein Requirements    Weight Used For Protein Calculations 80.7 kg (178 lb)  IBW     Est Protein Requirement Amount (gms/kg) 1.5 gm protein     Estimated Protein Requirements (gms/day) 121.11        Fluid Requirements    Fluid  Requirements (mL/day) 2000     RDA Method (mL) 2000                    Nutrition Prescription Ordered       Row Name 09/19/23 1527          Nutrition Prescription PO    Current PO Diet Regular     Fluid Consistency Thin     Common Modifiers Cardiac;Consistent Carbohydrate                    Evaluation of Received Nutrient/Fluid Intake       Row Name 09/19/23 1527          Nutrient/Fluid Evaluation    Number of Days Evaluated Other (comment)  insufficient data; admission < 24 hours        Fluid Intake Evaluation    Oral Fluid (mL) --  insufficient data; admission < 24 hours        PO Evaluation    Number of Days PO Intake Evaluated Insufficient Data     % PO Intake insufficient data; admission < 24 hours                       Problem/Interventions:   Problem 1       Row Name 09/19/23 1527          Nutrition Diagnoses Problem 1    Problem 1 Predicted Suboptimal Intake     Etiology (related to) Medical Diagnosis     Gastrointestinal Gastroparesis     Signs/Symptoms (evidenced by) Report of Mnimal PO Intake;Report/Observation     Reported/Observed By Patient     Reported GI Symptoms Nausea and vomiting;Other (comment)  without vomiting; early satiety                          Intervention Goal       Row Name 09/19/23 1528          Intervention Goal    General Meet nutritional needs for age/condition;Reduce/improve symptoms;Disease management/therapy     PO Tolerate PO;Meet estimated needs     Weight No significant weight loss                    Nutrition Intervention       Row Name 09/19/23 1528          Nutrition Intervention    RD/Tech Action Care plan reviewd;Follow Tx progress;Encourage intake;Interview for preference;Menu provided                    Nutrition Prescription       Row Name 09/19/23 1528          Nutrition Prescription PO    PO Prescription Other (comment)  observe SLP recommendations                    Education/Evaluation       Row Name 09/19/23 1528          Education    Education No discharge needs  identified at this time        Monitor/Evaluation    Monitor Per protocol                     Electronically signed by:  Jie Green RDN, DEEPA  09/19/23 15:28 CDT

## 2023-09-19 NOTE — H&P
[facility]  ABUNDIO Page M.D.  SUSAN Haney          Internal Medicine History and Physical      Name: Erick Luong  MRN: [id number]     Acct: [id number]  Room: 9/    Admit Date: 9/18/2023  PCP: Del Shetty MD    Chief Complaint:     Need for continued rehabilitation efforts, wound care and IV antibiotics    History Obtained From:     chart review and the patient    History of Present Illness:      Erick Luong is a  68 y.o.  male who presents with need for continued rehabilitation efforts, wound care and IV antibiotic therapy following a recent acute care stay. The patient had been in his usual state of health when he developed acute worsening of a wound that was under care. The patient had suffered a fall and was using a walking boot when he developed increased bloody, malodorous drainage from an existing wound. He presented to his podiatrist and was sent to ER for evaluation and treatment. He was found to have evidence of abscess to the left foot with gas gangrene. He was treated with IV antibiotics and underwent surgical debridement on 9/7/23 with post-operative wound measuring 6 cm x 17 cm x 4 cm with exposed bone. Surgical cultures returned positive for Proteus mirabilis. IV antibiotics with Rocephin were recommended for 6 weeks. Post-operatively, the patient developed new onset atrial fibrillation with associated chest pain. He was treated with beta blockers and anticoagulated with Eliquis. He developed acute on chronic diastolic CHF and required diuresis. He has also had issues with increased GERD with nausea and vomiting. He was evaluated by GI and underwent gastric emptying study that unfortunately revealed evidence of gastroparesis. He was treated with PPI, carafate and reglan. He was seen in consultation by nephrology for acute kidney injury on CKD 3. Renal function appears to have returned to his baseline. He transferred to our facility for ongoing antibiotic  therapy, wound care and rehabilitation efforts.     Past Medical History:     Past Medical History:   Diagnosis Date    Arthritis     Autonomic disease     CAD (coronary artery disease) 02/06/2017    Cervical radiculopathy 09/16/2021    Chronic constipation with acute exaccerbation 05/10/2021    Coronary artery disease     Degeneration of cervical intervertebral disc 08/11/2021    Diabetes mellitus     Diabetic foot ulcer 08/31/2020    Diabetic polyneuropathy associated with type 2 diabetes mellitus 01/18/2021    Elevated cholesterol     Gastroesophageal reflux disease 05/13/2019    Headache     HTN (hypertension), benign 05/03/2017    Hyperlipidemia     Hypertension     Mixed hyperlipidemia 02/07/2017    Multiple lung nodules 01/26/2020    5mm, 9 mm RLL identified 1/2020, not present 10/2019.    Myocardial infarction     Osteomyelitis 01/22/2020    Osteomyelitis of fifth toe of right foot 10/07/2019    Pancreatitis     Persistent insomnia 01/20/2020    Renal disorder     Sleep apnea 02/06/2017    Sleep apnea with use of continuous positive airway pressure (CPAP)     NON-COMPLIANT    Slow transit constipation 01/16/2019    Spinal stenosis in cervical region 09/16/2021    Vitamin D deficiency 03/02/2021        Past Surgical History:     Past Surgical History:   Procedure Laterality Date    ABDOMINAL SURGERY      AMPUTATION FOOT / TOE Left 10/2021    5th digit     ANTERIOR CERVICAL DISCECTOMY W/ FUSION N/A 8/5/2022    Procedure: CERVICAL DISCECTOMY ANTERIOR WITH FUSION C5-6 with possible plating of C5-7 with neuromonitoring and 1 c-arm;  Surgeon: Karel Soliz MD;  Location: [facility] OR;  Service: Neurosurgery;  Laterality: N/A;    APPENDECTOMY      BACK SURGERY      CARDIAC CATHETERIZATION Left 02/08/2021    Procedure: Left Heart Cath w poss intervention left anatomical snuff box acess;  Surgeon: Omkar Charles DO;  Location:  [facility] CATH INVASIVE LOCATION;  Service: Cardiology;  Laterality: Left;     CARDIAC SURGERY      CATARACT EXTRACTION      CERVICAL SPINE SURGERY      COLONOSCOPY N/A 01/31/2017    Normal exam repeat in 5 years    COLONOSCOPY N/A 02/11/2019    Mild acute inflammation    COLONOSCOPY W/ POLYPECTOMY  03/04/2014    Hyperplastic polyp    CORONARY ARTERY BYPASS GRAFT  10/2015    ENDOSCOPY  04/13/2011    Gastritis with hemorrhage    ENDOSCOPY N/A 05/05/2017    Normal exam    ENDOSCOPY N/A 02/11/2019    Gastritis    ENDOSCOPY N/A 09/01/2020    Non-erosive gastritis with hemorrhage    ENDOSCOPY N/A 02/10/2021    Esophagitis    FOOT SURGERY Left     INCISION AND DRAINAGE OF WOUND Left 09/2007    spider bite        Medications Prior to Admission:       Prior to Admission medications    Medication Sig Start Date End Date Taking? Authorizing Provider   amitriptyline (ELAVIL) 25 MG tablet Take 2 tablets by mouth Daily at bedtime. 2/16/23      amoxicillin-clavulanate (Augmentin) 500-125 MG per tablet Take 1 tablet by mouth every 12 hours with meals for 7 days. 9/5/23      ascorbic acid (VITAMIN C) 1000 MG tablet Take 1 tablet by mouth Daily. 8/25/23      Aspirin 81 MG capsule Take 81 mg by mouth Daily.    Provider, MD Bossman   bumetanide (BUMEX) 2 MG tablet Take 1 tablet by mouth Daily. Take 2 tablets in morning;1 tablet at night 8/23/23   Lexie Houston APRN   busPIRone (BUSPAR) 10 MG tablet Take 1 tablet by mouth 2 (Two) Times a Day As Needed. 6/21/23   Payam Keyes DO   calcitriol (ROCALTROL) 0.5 MCG capsule Take 1 capsule by mouth Daily. 2/23/23      carvedilol (COREG) 6.25 MG tablet Take 1 tablet by mouth 2 (Two) Times a Day. 12/22/22      Cholecalciferol (D-5000) 125 MCG (5000 UT) tablet Take 1 tablet by mouth Daily Before Lunch. 8/25/23      cloNIDine (CATAPRES) 0.1 MG tablet Take 1 tablet by mouth Every 12 (Twelve) Hours. 6/21/23   Payam Keyes DO   Diclofenac Sodium (VOLTAREN) 1 % gel gel Apply 2 g topically to the appropriate area as directed 4 (Four) Times a Day As Needed.  6/1/23      donepezil (ARICEPT) 10 MG tablet Take 1 tablet by mouth Daily. 7/20/22      Dulaglutide (Trulicity) 4.5 MG/0.5ML solution pen-injector Inject 0.5 mL under the skin into the appropriate area as directed 1 (One) Time Per Week. 7/24/23      empagliflozin (JARDIANCE) 25 MG tablet tablet Take 1 tablet by mouth Daily. 6/22/23   Payam Keyes DO   HYDROcodone-acetaminophen (NORCO) 7.5-325 MG per tablet Take 1 tablet by mouth 2 (Two) Times a Day As Needed. 9/5/23      Insulin Regular Human, Conc, (HumuLIN R U-500 KwikPen) 500 UNIT/ML solution pen-injector CONCENTRATED injection Inject 120 Units under the skin into the appropriate area as directed 2 (Two) Times a Day with breakfast and dinner.  Patient taking differently: Inject 120 Units under the skin into the appropriate area as directed 3 (Three) Times a Day Before Meals. 7/10/23      midodrine (PROAMATINE) 10 MG tablet Take 1 tablet by mouth 3 (Three) Times a Day. 9/10/23   Brian Lomas MD   ondansetron ODT (ZOFRAN-ODT) 8 MG disintegrating tablet Place 1 tablet under tongue 3 times a day as needed. 8/16/23      pantoprazole (Protonix) 40 MG EC tablet Take 1 tablet by mouth 2 (Two) Times a Day. 11/8/22      polyethylene glycol (MIRALAX) 17 g packet Take 17 g by mouth Daily. Obtain OTC 8/23/23   Lexie Houston APRN   pregabalin (LYRICA) 100 MG capsule Take 1 capsule by mouth Daily With Breakfast AND 2 capsules Every Night. 7/25/23      rosuvastatin (CRESTOR) 40 MG tablet Take 1 tablet by mouth Every Night. 3/18/22      sennosides-docusate (PERICOLACE) 8.6-50 MG per tablet Take 1 tablet by mouth Every Night. Obtain OTC 8/23/23   Lexie Houston APRN   sennosides-docusate (PERICOLACE) 8.6-50 MG per tablet Take 1 tablet by mouth every night at bedtime. 8/25/23      sodium hypochlorite (DAKIN'S 1/4 STRENGTH) 0.125 % solution topical solution 0.125% Apply to affected area twice daily 7/17/23      tamsulosin (FLOMAX) 0.4 MG capsule 24 hr capsule Take 1 capsule by  mouth Daily. 8/7/23      spironolactone (ALDACTONE) 25 MG tablet Take 1 tablet by mouth Daily. 9/28/20 12/31/20  Del Shetty MD        Allergies:       Cefepime, Bactrim [sulfamethoxazole-trimethoprim], Vancomycin, Zolpidem, Zolpidem tartrate, and Metronidazole    Social History:     Tobacco:    reports that he quit smoking about 32 years ago. His smoking use included cigarettes. He has never used smokeless tobacco.  Alcohol:      reports no history of alcohol use.  Drug Use:  reports no history of drug use.    Family History:     Family History   Problem Relation Age of Onset    Colon cancer Father     Heart disease Father     Colon cancer Sister     Colon polyps Sister     Alzheimer's disease Mother     Coronary artery disease Sister     Coronary artery disease Sister        Review of Systems:     Review of Systems   Constitutional: Positive for malaise/fatigue. Negative for chills, decreased appetite, weight gain and weight loss.   HENT:  Negative for congestion, ear discharge, hoarse voice and tinnitus.    Eyes:  Negative for blurred vision, discharge, visual disturbance and visual halos.   Cardiovascular:  Positive for chest pain and irregular heartbeat. Negative for claudication, dyspnea on exertion, leg swelling, orthopnea and paroxysmal nocturnal dyspnea.   Respiratory:  Negative for cough, shortness of breath, sputum production and wheezing.    Endocrine: Negative for cold intolerance, heat intolerance and polyuria.   Hematologic/Lymphatic: Negative for adenopathy. Does not bruise/bleed easily.   Skin:  Positive for poor wound healing. Negative for dry skin, itching and suspicious lesions.   Musculoskeletal:  Negative for arthritis, back pain, falls, joint pain, muscle weakness and myalgias.   Gastrointestinal:  Negative for abdominal pain, constipation, diarrhea, dysphagia and hematemesis.   Genitourinary:  Negative for bladder incontinence, dysuria and frequency.   Neurological:  Positive for  weakness. Negative for aphonia, disturbances in coordination and dizziness.   Psychiatric/Behavioral:  Negative for altered mental status, depression, memory loss and substance abuse. The patient does not have insomnia and is not nervous/anxious.      Code Status:    There are no questions and answers to display.       Physical Exam:     Vitals:    T 98.1 P 71 R 20 /52 Sp02 98% (room air)  Physical Exam  Vitals and nursing note reviewed.   Constitutional:       Appearance: Normal appearance. He is well-developed. He is obese.   HENT:      Head: Normocephalic and atraumatic.      Right Ear: External ear normal.      Left Ear: External ear normal.      Nose: Nose normal.      Mouth/Throat:      Mouth: Mucous membranes are dry.      Pharynx: Oropharynx is clear.   Eyes:      Pupils: Pupils are equal, round, and reactive to light.   Cardiovascular:      Rate and Rhythm: Normal rate. Rhythm irregular.      Heart sounds: Normal heart sounds.   Pulmonary:      Effort: Pulmonary effort is normal.      Breath sounds: Normal breath sounds.   Abdominal:      General: Bowel sounds are normal.      Palpations: Abdomen is soft.   Musculoskeletal:         General: Normal range of motion.      Cervical back: Normal range of motion and neck supple.      Comments: Generalized weakness   Skin:     General: Skin is warm and dry.      Comments: Dressing c.d.i   Neurological:      Mental Status: He is alert and oriented to person, place, and time.      Deep Tendon Reflexes: Reflexes are normal and symmetric.   Psychiatric:         Behavior: Behavior normal.             Data:     Lab Results (last 7 days)       Procedure Component Value Units Date/Time    Prealbumin [[id number]]  (Abnormal) Collected: 09/19/23 0629    Specimen: Blood Updated: 09/19/23 1253     Prealbumin 12.7 mg/dL     POC Glucose Once [[id number]]  (Abnormal) Collected: 09/19/23 1115    Specimen: Blood Updated: 09/19/23 1126     Glucose 216 mg/dL      Comment:  : mona GrullonyMeter ID: [id number]       POC Glucose Once [[id number]]  (Abnormal) Collected: 09/19/23 0723    Specimen: Blood Updated: 09/19/23 0737     Glucose 155 mg/dL      Comment: : mona GrullonyMeter ID: [id number]       Comprehensive Metabolic Panel [[id number]]  (Abnormal) Collected: 09/19/23 0629    Specimen: Blood Updated: 09/19/23 0719     Glucose 112 mg/dL      BUN 23 mg/dL      Creatinine 1.87 mg/dL      Sodium 140 mmol/L      Potassium 4.3 mmol/L      Chloride 105 mmol/L      CO2 23.0 mmol/L      Calcium 9.2 mg/dL      Total Protein 6.9 g/dL      Albumin 2.9 g/dL      ALT (SGPT) 9 U/L      AST (SGOT) 12 U/L      Alkaline Phosphatase 106 U/L      Total Bilirubin 0.2 mg/dL      Globulin 4.0 gm/dL      A/G Ratio 0.7 g/dL      BUN/Creatinine Ratio 12.3     Anion Gap 12.0 mmol/L      eGFR 38.9 mL/min/1.73     Narrative:      GFR Normal >60  Chronic Kidney Disease <60  Kidney Failure <15      CBC & Differential [[id number]]  (Abnormal) Collected: 09/19/23 0629    Specimen: Blood Updated: 09/19/23 0700    Narrative:      The following orders were created for panel order CBC & Differential.  Procedure                               Abnormality         Status                     ---------                               -----------         ------                     CBC Auto Differential[[id number]]        Abnormal            Final result                 Please view results for these tests on the individual orders.    CBC Auto Differential [[id number]]  (Abnormal) Collected: 09/19/23 0629    Specimen: Blood Updated: 09/19/23 0700     WBC 10.86 10*3/mm3      RBC 3.38 10*6/mm3      Hemoglobin 8.9 g/dL      Hematocrit 30.2 %      MCV 89.3 fL      MCH 26.3 pg      MCHC 29.5 g/dL      RDW 15.7 %      RDW-SD 51.2 fl      MPV 11.5 fL      Platelets 381 10*3/mm3      Neutrophil % 66.7 %      Lymphocyte % 19.9 %      Monocyte % 7.5 %      Eosinophil % 4.4 %      Basophil % 1.1 %       Immature Grans % 0.4 %      Neutrophils, Absolute 7.25 10*3/mm3      Lymphocytes, Absolute 2.16 10*3/mm3      Monocytes, Absolute 0.81 10*3/mm3      Eosinophils, Absolute 0.48 10*3/mm3      Basophils, Absolute 0.12 10*3/mm3      Immature Grans, Absolute 0.04 10*3/mm3      nRBC 0.0 /100 WBC     POC Glucose Once [[id number]]  (Abnormal) Collected: 09/18/23 2122    Specimen: Blood Updated: 09/18/23 2140     Glucose 275 mg/dL      Comment: : angie Flor ID: [id number]             Adult Transthoracic Echo Complete With Contrast if Necessary Per Protocol (With Agitated Saline)    Result Date: 8/22/2023  Narrative:   Technically difficult study.   Left ventricular systolic function is low normal. Left ventricular ejection fraction appears to be 51 - 55%.   Left ventricular diastolic dysfunction is noted.   Normal right ventricular cavity size and systolic function noted.   There is no significant (greater than mild) valvular dysfunction.   Compared to study from 1/21/2023, there are no significant changes.     CT Head Without Contrast    Result Date: 8/21/2023  Narrative: EXAMINATION: CT HEAD WO CONTRAST-   8/21/2023 8:47 PM CDT  HISTORY: Syncope. Loss of consciousness. Chest pain.  In order to have a CT radiation dose as low as reasonably achievable Automated Exposure Control was utilized for adjustment of the mA and/or KV according to patient size.  DLP in mGycm= 774.  Comparison is made with 03/04/2022.  Axial, sagittal, and coronal noncontrast CT imaging of the head.  The visualized paranasal sinuses are clear.  The brain and ventricles have an age appropriate appearance.  There is no hemorrhage or mass-effect. No acute infarction is seen.  No calvarial abnormality.      Impression: 1. No acute intracranial abnormality is seen.              This report was finalized on 08/21/2023 21:17 by Dr. Gomez Mcgill MD.    MRI Brain Without Contrast    Result Date: 8/29/2023  Narrative: HISTORY: Syncope,  cerebrovascular cough suspected  MRI BRAIN: Multiplanar images the brain is performed without contrast  COMPARISON: CT head 08/21/2023  FINDINGS: No diffusion restriction to indicate acute ischemia. No intracranial blood products identified. There is no intracranial mass. Minimal nonspecific hyperintense T2 FLAIR signal of the right periventricular region, minimal chronic central vessel ischemia considered. No abnormal extra-axial fluid collection.  Large 3.3 cm mucous retention cyst suspected within the inferior right maxillary sinus. Limited assessment of the orbits and base of skull is unremarkable. Appropriate flow voids of the distal internal carotid and basilar arteries.      Impression: 1. No diffusion restriction to indicate acute ischemia. No intracranial hemorrhage or mass. 2. Minimal hyperintense FLAIR signal may represent mild chronic small vessel ischemia. 3. Right maxillary mucous retention cyst. This report was finalized on 08/29/2023 10:24 by Dr. Yuliana Calhoun MD.    XR Chest 1 View    Result Date: 8/28/2023  Narrative: EXAMINATION: Chest 1 view 08/28/2023  HISTORY: Syncope  FINDINGS: Today's exam is compared to previous study of 7 days earlier. Lungs are fully expanded and clear. There is no effusion or free air present. The patient has undergone prior median sternotomy for coronary bypass grafting.      Impression: . No acute disease. This report was finalized on 08/28/2023 18:54 by Dr. Phillip Hart MD.    XR Chest 1 View    Result Date: 8/21/2023  Narrative: EXAMINATION: XR CHEST 1 VW-  8/21/2023 4:45 PM CDT  HISTORY: chest pain  1 view chest x-ray.  Comparison is made with 06/14/2023.  Magnified heart size. Heart size is stable. CABG changes. Cervical spine fusion hardware.  Mild chronic interstitial lung disease with a few granulomas.  No pneumonia, pneumothorax, or congestive failure.  Summary: 1. Stable chronic lung changes. 2. No acute abnormality is seen. This report was finalized on  08/21/2023 16:58 by Dr. Gomez Mcgill MD.    US Carotid Bilateral    Result Date: 8/29/2023  Narrative: History: Carotid occlusive disease      Impression: Impression: 1. There is less than 50% stenosis of the right internal carotid artery. 2. There is less than 50% stenosis of the left internal carotid artery. 3. Both vertebral arteries were not visualized.  Comments: Bilateral carotid vertebral arterial duplex scan was performed.  Grayscale imaging shows intimal thickening and calcified elements at the carotid bifurcation. The right internal carotid artery peak systolic velocity is 47.1 cm/sec. The end-diastolic velocity is 13.3 cm/sec. The right ICA/CCA ratio is approximately 0.6 . These findings correlate with less than 50% stenosis of the right internal carotid artery.  Grayscale imaging shows intimal thickening and calcified elements at the carotid bifurcation. The left internal carotid artery peak systolic velocity is 99.9 cm/sec. The end-diastolic velocity is 28.5 cm/sec. The left ICA/CCA ratio is approximately 1.0 . These findings correlate with less than 50% stenosis of the left internal carotid artery.    This report was finalized on 08/29/2023 15:52 by Dr. Benito Garvey MD.    CT Angiogram Chest    Result Date: 8/21/2023  Narrative: EXAMINATION: CT ANGIOGRAM CHEST-   8/21/2023 8:47 PM CDT  HISTORY: Syncope. Loss of consciousness. Chest pain.  In order to have a CT radiation dose as low as reasonably achievable Automated Exposure Control was utilized for adjustment of the mA and/or KV according to patient size.  DLP in mGycm= 263.  CT angiogram chest with IV contrast. CT angiography protocol. CT imaging with bolus IV contrast injection. Under concurrent supervision axial, sagittal, coronal, three-dimensional, and MIP data sets were constructed on an independent work station.  Comparison is made with 01/19/2023.  Detail is affected by patient size and beam attenuation.  Cardiomegaly. CABG changes.   Normal size thoracic aorta. Limited visualization of small distal arterial branches though the appearance of the pulmonary arteries is unchanged compared with 7 months ago. No convincing evidence of pulmonary embolus.  The lung show mild patchy atelectasis.  Summary: 1. Stable chest CT appearance compared with 7 months ago. 2. No convincing evidence of pulmonary embolus.            This report was finalized on 08/21/2023 21:21 by Dr. Gomez Mcgill MD.       Assessment:         * No active hospital problems. *    Past Medical History:   Diagnosis Date    Arthritis     Autonomic disease     CAD (coronary artery disease) 02/06/2017    Cervical radiculopathy 09/16/2021    Chronic constipation with acute exaccerbation 05/10/2021    Coronary artery disease     Degeneration of cervical intervertebral disc 08/11/2021    Diabetes mellitus     Diabetic foot ulcer 08/31/2020    Diabetic polyneuropathy associated with type 2 diabetes mellitus 01/18/2021    Elevated cholesterol     Gastroesophageal reflux disease 05/13/2019    Headache     HTN (hypertension), benign 05/03/2017    Hyperlipidemia     Hypertension     Mixed hyperlipidemia 02/07/2017    Multiple lung nodules 01/26/2020    5mm, 9 mm RLL identified 1/2020, not present 10/2019.    Myocardial infarction     Osteomyelitis 01/22/2020    Osteomyelitis of fifth toe of right foot 10/07/2019    Pancreatitis     Persistent insomnia 01/20/2020    Renal disorder     Sleep apnea 02/06/2017    Sleep apnea with use of continuous positive airway pressure (CPAP)     NON-COMPLIANT    Slow transit constipation 01/16/2019    Spinal stenosis in cervical region 09/16/2021    Vitamin D deficiency 03/02/2021       Plan:     Proteus wound infection s/p debridement  New onset atrial fibrillation  Acute on chronic diastolic CHF  CKD3  Multifactorial anemia  DM2 with hyperglycemia on long term insulin  BRITTNI  Chronic pain syndrome  Chronic neck pain with radiculopathy  Diabetic peripheral  neuropathy  Hx CAD  GERD  Possible gastroparesis  Continue current treatment. Monitor counts. Increase activity. Labs Thursday. Consult cardiology - new onset a fib, acute on chronic CHF, chest pain. Monitor renal function closely. Wound care per wound care team. Continue IV antibiotics.       Electronically signed by SUSAN Muñoz on 9/19/2023 at 13:43 CDT     Copy sent to Del Castaneda MD  I have discussed the care of Erick Luong, including pertinent history and exam findings, with the nurse practitioner.    I have seen and examined the patient and the key elements of all parts of the encounter have been performed by me.  I agree with the assessment, plan and orders as documented by SUSAN Haney, after I modified the exam findings and the plan of treatments and the final version is my approved version of the assessment.        Electronically signed by Juan Manuel Page MD on 9/20/2023 at 10:27 CDT

## 2023-09-19 NOTE — OUTREACH NOTE
Medical Week 3 Survey      Flowsheet Row Responses   [facility] patient discharged from? [facility]   Does the patient have one of the following disease processes/diagnoses(primary or secondary)? Other   Week 3 attempt successful? No   Unsuccessful attempts Attempt 1   Revoke Readmitted            Shaniqua CHESTER - Registered Nurse

## 2023-09-19 NOTE — CONSULTS
Nephrology ([facility]) Consult Note      Patient:  Erick Luong  YOB: 1955  Date of Service: 9/19/2023  MRN: [id number]   Acct: [id number]   Primary Care Physician: Del Shetty MD  Advance Directive:   There are no questions and answers to display.     Admit Date: 9/18/2023       Hospital Day: 0  Referring Provider: Juan Manuel Page MD      Patient Seen, Chart, Consults, Notes, Labs, Radiology studies reviewed.    Chief complaint: Abnormal labs.    Subjective:  Erick Luong is a 68 y.o. male  whom we were consulted for chronic kidney disease stage IIIb.  He has stage IIIb chronic kidney disease baseline, follows me in the office as he has diabetic nephropathy.  He has history of insulin-dependent type 2 diabetes, hypertension, abdominal obesity, obstructive sleep apnea, diabetic foot ulcer and coronary artery disease.  He was accepted as a transfer from [facility].  Patient was admitted at [facility] by Dr. Gomes and extensive debridement of left foot wound at plantar aspect.  Postoperative wound measuring 6 x 17 x 4 cm with exposed bone.  Surgical culture returned positive for Proteus Mirabills.  Patient needed long-term IV antibiotics.  He is now admitted for wound care, long-term IV antibiotics, chronic kidney disease and treatment of diastolic congestive heart failure.  Patient also has history of gastroparesis.    This afternoon he feels well.  He denies any shortness of breath or swelling of legs.  His serum creatinine is 1.8 mg which is close to his baseline.    Allergies:  Cefepime, Bactrim [sulfamethoxazole-trimethoprim], Vancomycin, Zolpidem, Zolpidem tartrate, and Metronidazole    Home Meds:  Medications Prior to Admission   Medication Sig Dispense Refill Last Dose    amitriptyline (ELAVIL) 25 MG tablet Take 2 tablets by mouth Daily at bedtime. 60 tablet 1     amoxicillin-clavulanate (Augmentin) 500-125 MG per tablet Take 1 tablet by  mouth every 12 hours with meals for 7 days. 14 tablet 0     ascorbic acid (VITAMIN C) 1000 MG tablet Take 1 tablet by mouth Daily. 30 tablet 3     Aspirin 81 MG capsule Take 81 mg by mouth Daily.       bumetanide (BUMEX) 2 MG tablet Take 1 tablet by mouth Daily. Take 2 tablets in morning;1 tablet at night       busPIRone (BUSPAR) 10 MG tablet Take 1 tablet by mouth 2 (Two) Times a Day As Needed. 100 tablet 3     calcitriol (ROCALTROL) 0.5 MCG capsule Take 1 capsule by mouth Daily. 90 capsule 4     carvedilol (COREG) 6.25 MG tablet Take 1 tablet by mouth 2 (Two) Times a Day. 180 tablet 4     Cholecalciferol (D-5000) 125 MCG (5000 UT) tablet Take 1 tablet by mouth Daily Before Lunch. 30 tablet 2     cloNIDine (CATAPRES) 0.1 MG tablet Take 1 tablet by mouth Every 12 (Twelve) Hours. 60 tablet 2     Diclofenac Sodium (VOLTAREN) 1 % gel gel Apply 2 g topically to the appropriate area as directed 4 (Four) Times a Day As Needed. 300 g 11     donepezil (ARICEPT) 10 MG tablet Take 1 tablet by mouth Daily. 90 tablet 4     Dulaglutide (Trulicity) 4.5 MG/0.5ML solution pen-injector Inject 0.5 mL under the skin into the appropriate area as directed 1 (One) Time Per Week. 2 mL 11     empagliflozin (JARDIANCE) 25 MG tablet tablet Take 1 tablet by mouth Daily. 30 tablet 2     HYDROcodone-acetaminophen (NORCO) 7.5-325 MG per tablet Take 1 tablet by mouth 2 (Two) Times a Day As Needed. 40 tablet 0     Insulin Regular Human, Conc, (HumuLIN R U-500 KwikPen) 500 UNIT/ML solution pen-injector CONCENTRATED injection Inject 120 Units under the skin into the appropriate area as directed 2 (Two) Times a Day with breakfast and dinner. (Patient taking differently: Inject 120 Units under the skin into the appropriate area as directed 3 (Three) Times a Day Before Meals.) 18 mL 5     midodrine (PROAMATINE) 10 MG tablet Take 1 tablet by mouth 3 (Three) Times a Day. 270 tablet 4     ondansetron ODT (ZOFRAN-ODT) 8 MG disintegrating tablet Place 1  tablet under tongue 3 times a day as needed. 25 tablet 1     pantoprazole (Protonix) 40 MG EC tablet Take 1 tablet by mouth 2 (Two) Times a Day. 180 tablet 4     polyethylene glycol (MIRALAX) 17 g packet Take 17 g by mouth Daily. Obtain OTC       pregabalin (LYRICA) 100 MG capsule Take 1 capsule by mouth Daily With Breakfast AND 2 capsules Every Night. 90 capsule 2     rosuvastatin (CRESTOR) 40 MG tablet Take 1 tablet by mouth Every Night. 90 tablet 3     sennosides-docusate (PERICOLACE) 8.6-50 MG per tablet Take 1 tablet by mouth Every Night. Obtain OTC       sennosides-docusate (PERICOLACE) 8.6-50 MG per tablet Take 1 tablet by mouth every night at bedtime. 30 tablet 2     sodium hypochlorite (DAKIN'S 1/4 STRENGTH) 0.125 % solution topical solution 0.125% Apply to affected area twice daily 473 mL 3     tamsulosin (FLOMAX) 0.4 MG capsule 24 hr capsule Take 1 capsule by mouth Daily. 90 capsule 1        Medicines:  Current Facility-Administered Medications   Medication Dose Route Frequency Provider Last Rate Last Admin    acetaminophen (TYLENOL) tablet 650 mg  650 mg Oral Q4H PRN Juan Manuel Page MD        Or    acetaminophen (TYLENOL) suppository 650 mg  650 mg Rectal Q4H PRN Juan Manuel Page MD        aluminum-magnesium hydroxide-simethicone (MAALOX MAX) 400-400-40 MG/5ML suspension 30 mL  30 mL Oral Q4H PRN Juan Manuel Page MD        apixaban (ELIQUIS) tablet 5 mg  5 mg Oral BID With Meals Juan Manuel Page MD        bumetanide (BUMEX) tablet 1 mg  1 mg Oral Daily Juan Manuel Page MD        busPIRone (BUSPAR) tablet 10 mg  10 mg Oral Q12H PRN Juan Manuel Page MD        calcitriol (ROCALTROL) capsule 0.5 mcg  0.5 mcg Oral Daily Juan Manuel Page MD        carvedilol (COREG) tablet 3.125 mg  3.125 mg Oral BID With Meals Juan Manuel Page MD        cefTRIAXone (ROCEPHIN) 1,000 mg in sodium chloride 0.9 % 100 mL IVPB  1,000 mg Intravenous Q24H Juan Manuel Page MD         cholecalciferol (VITAMIN D3) tablet 5,000 Units  5,000 Units Oral Daily Juan Manuel Page MD        dextrose (D50W) (25 g/50 mL) IV injection 25 g  25 g Intravenous Q15 Min PRN Juan Manuel Page MD        dextrose (GLUTOSE) oral gel 15 g  15 g Oral Q15 Min PRN Juan Manuel Page MD        docusate sodium (COLACE) capsule 200 mg  200 mg Oral BID Juan Manuel Page MD        donepezil (ARICEPT) tablet 10 mg  10 mg Oral Daily Juan Manuel Page MD        [START ON 9/25/2023] Dulaglutide solution pen-injector 4.5 mg  4.5 mg Subcutaneous Weekly Juan Manuel Page MD        empagliflozin (JARDIANCE) tablet 25 mg  25 mg Oral Daily Juan Manuel Page MD        glucagon (GLUCAGEN) injection 1 mg  1 mg Intramuscular Q15 Min PRN JuanM anuel Page MD        HYDROmorphone (DILAUDID) injection 1 mg  1 mg Intravenous Q6H PRN Juan Manuel Page MD        insulin detemir (LEVEMIR) injection 20 Units  20 Units Subcutaneous Daily Juan Manuel Page MD        Insulin Lispro (humaLOG) injection 2-9 Units  2-9 Units Subcutaneous 4x Daily AC & at Bedtime Juan Manuel Page MD        magnesium hydroxide (MILK OF MAGNESIA) 400 MG/5ML suspension 15 mL  15 mL Oral Daily PRN Juan Manuel Page MD        metoclopramide (REGLAN) tablet 10 mg  10 mg Oral TID AC Juan Manuel Page MD        ondansetron (ZOFRAN) tablet 4 mg  4 mg Oral Q6H PRN Juan Manuel Page MD        Or    ondansetron (ZOFRAN) injection 4 mg  4 mg Intravenous Q6H PRN Juan Manuel Page MD        oxyCODONE (oxyCONTIN) 12 hr tablet 10 mg  10 mg Oral Q12H Juan Manuel Page MD        oxyCODONE (ROXICODONE) immediate release tablet 5 mg  5 mg Oral Q4H PRN Juan Manuel Page MD        pantoprazole (PROTONIX) EC tablet 40 mg  40 mg Oral BID Juan Manuel Page MD        polyethylene glycol (MIRALAX) packet 17 g  17 g Oral Daily Juan Manuel Page MD        pregabalin (LYRICA) capsule 100 mg  100 mg Oral  Daily Juan Manuel Page MD        pregabalin (LYRICA) capsule 200 mg  200 mg Oral Nightly Juan Manuel Page MD        rosuvastatin (CRESTOR) tablet 40 mg  40 mg Oral Nightly Juan Manuel Page MD        saccharomyces boulardii (FLORASTOR) capsule 250 mg  250 mg Oral BID Juan Manuel Page MD        sennosides-docusate (PERICOLACE) 8.6-50 MG per tablet 1 tablet  1 tablet Oral Nightly Juan Manuel Page MD        sodium chloride 0.9 % flush 10 mL  10 mL Intravenous Q12H Juan Manuel Page MD        sodium chloride 0.9 % flush 10 mL  10 mL Intravenous PRN Juan Manuel Page MD        sodium hypochlorite (DAKIN'S 1/4 STRENGTH) 0.125 % topical solution 0.125% solution   Topical Q8H Juan Manuel Page MD        sucralfate (CARAFATE) tablet 1 g  1 g Oral TID AC Juan Manuel Page MD        tamsulosin (FLOMAX) 24 hr capsule 0.4 mg  0.4 mg Oral Daily Juan Manuel Page MD        valsartan (DIOVAN) tablet 40 mg  40 mg Oral Nightly Juan Manuel Page MD           Past Medical History:  Past Medical History:   Diagnosis Date    Arthritis     Autonomic disease     CAD (coronary artery disease) 02/06/2017    Cervical radiculopathy 09/16/2021    Chronic constipation with acute exaccerbation 05/10/2021    Coronary artery disease     Degeneration of cervical intervertebral disc 08/11/2021    Diabetes mellitus     Diabetic foot ulcer 08/31/2020    Diabetic polyneuropathy associated with type 2 diabetes mellitus 01/18/2021    Elevated cholesterol     Gastroesophageal reflux disease 05/13/2019    Headache     HTN (hypertension), benign 05/03/2017    Hyperlipidemia     Hypertension     Mixed hyperlipidemia 02/07/2017    Multiple lung nodules 01/26/2020    5mm, 9 mm RLL identified 1/2020, not present 10/2019.    Myocardial infarction     Osteomyelitis 01/22/2020    Osteomyelitis of fifth toe of right foot 10/07/2019    Pancreatitis     Persistent insomnia 01/20/2020    Renal disorder      Sleep apnea 02/06/2017    Sleep apnea with use of continuous positive airway pressure (CPAP)     NON-COMPLIANT    Slow transit constipation 01/16/2019    Spinal stenosis in cervical region 09/16/2021    Vitamin D deficiency 03/02/2021       Past Surgical History:  Past Surgical History:   Procedure Laterality Date    ABDOMINAL SURGERY      AMPUTATION FOOT / TOE Left 10/2021    5th digit     ANTERIOR CERVICAL DISCECTOMY W/ FUSION N/A 8/5/2022    Procedure: CERVICAL DISCECTOMY ANTERIOR WITH FUSION C5-6 with possible plating of C5-7 with neuromonitoring and 1 c-arm;  Surgeon: Karel Soliz MD;  Location:  [facility] OR;  Service: Neurosurgery;  Laterality: N/A;    APPENDECTOMY      BACK SURGERY      CARDIAC CATHETERIZATION Left 02/08/2021    Procedure: Left Heart Cath w poss intervention left anatomical snuff box acess;  Surgeon: Omkar Charles DO;  Location:  [facility] CATH INVASIVE LOCATION;  Service: Cardiology;  Laterality: Left;    CARDIAC SURGERY      CATARACT EXTRACTION      CERVICAL SPINE SURGERY      COLONOSCOPY N/A 01/31/2017    Normal exam repeat in 5 years    COLONOSCOPY N/A 02/11/2019    Mild acute inflammation    COLONOSCOPY W/ POLYPECTOMY  03/04/2014    Hyperplastic polyp    CORONARY ARTERY BYPASS GRAFT  10/2015    ENDOSCOPY  04/13/2011    Gastritis with hemorrhage    ENDOSCOPY N/A 05/05/2017    Normal exam    ENDOSCOPY N/A 02/11/2019    Gastritis    ENDOSCOPY N/A 09/01/2020    Non-erosive gastritis with hemorrhage    ENDOSCOPY N/A 02/10/2021    Esophagitis    FOOT SURGERY Left     INCISION AND DRAINAGE OF WOUND Left 09/2007    spider bite       Family History  Family History   Problem Relation Age of Onset    Colon cancer Father     Heart disease Father     Colon cancer Sister     Colon polyps Sister     Alzheimer's disease Mother     Coronary artery disease Sister     Coronary artery disease Sister        Social History  Social History     Socioeconomic History    Marital status:     Tobacco Use    Smoking status: Former     Types: Cigarettes     Quit date:      Years since quittin.7    Smokeless tobacco: Never    Tobacco comments:     smoked in highschool   Vaping Use    Vaping Use: Never used   Substance and Sexual Activity    Alcohol use: No    Drug use: No    Sexual activity: Defer         Review of Systems:  History obtained from chart review and the patient  General ROS: No fever or chills  Respiratory ROS: No cough, shortness of breath, wheezing  Cardiovascular ROS: no chest pain or dyspnea on exertion  Gastrointestinal ROS: No abdominal pain or melena  Genito-Urinary ROS: No dysuria or hematuria  14 point ROS reviewed with the patient and negative except as noted above and in the HPI unless unable to obtain.    Objective:  Blood pressure: 107/50 mmHg  Heart rate is 71 bpm  O2 saturation 98%.  General: awake/alert   Chest:  clear to auscultation bilaterally without respiratory distress  CVS: regular rate and rhythm  Abdominal: soft, nontender, normal bowel sounds  Extremities:  Extensive left foot wound/plantar aspect  Skin: warm and dry without rash      Labs:    Results from last 7 days   Lab Units 23  0629   WBC 10*3/mm3 10.86*   HEMOGLOBIN g/dL 8.9*   HEMATOCRIT % 30.2*   PLATELETS 10*3/mm3 381       Results from last 7 days   Lab Units 23  0629   SODIUM mmol/L 140   POTASSIUM mmol/L 4.3   CHLORIDE mmol/L 105   CO2 mmol/L 23.0   BUN mg/dL 23   CREATININE mg/dL 1.87*   CALCIUM mg/dL 9.2   BILIRUBIN mg/dL 0.2   ALK PHOS U/L 106   ALT (SGPT) U/L 9   AST (SGOT) U/L 12   GLUCOSE mg/dL 112*   EGFR mL/min/1.73 38.9*         Radiology:   Imaging Results (Last 24 Hours)       ** No results found for the last 24 hours. **            Culture:  No components found for: WOUNDCUL, 3  No components found for: CSFCUL, 3  No components found for: BC, 3  No components found for: URINECUL, 3      Assessment   1.  Stage IIIb chronic kidney disease baseline  2.  Status post  debridement of diabetic foot ulcer  3.  Chronic diastolic CHF.  4.  Proteus wound infection, need long-term IV antibiotics.  5.  Type II diabetic nephropathy.  6.  Insulin-dependent type 2 diabetes  7.  History of coronary artery disease.  8.  History of diabetic gastroparesis  9.  Secondary hyperparathyroidism.    Plan:  1.  Continue Bumex 1 mg p.o. daily.  2.  P.o. calcitriol.  3.  IV antibiotics.  4.  I will continue to monitor renal function closely.    Thank you for the consult, we appreciate the opportunity to provide care to your patients.  Feel free to contact me if I can be of any further assistance.      Brian Lomas MD  9/19/2023  18:20 CDT

## 2023-09-20 LAB
ANION GAP SERPL CALCULATED.3IONS-SCNC: 12 MMOL/L (ref 5–15)
BASOPHILS # BLD AUTO: 0.1 10*3/MM3 (ref 0–0.2)
BASOPHILS NFR BLD AUTO: 1.2 % (ref 0–1.5)
BUN SERPL-MCNC: 21 MG/DL (ref 8–23)
BUN/CREAT SERPL: 10.8 (ref 7–25)
CALCIUM SPEC-SCNC: 8.9 MG/DL (ref 8.6–10.5)
CHLORIDE SERPL-SCNC: 103 MMOL/L (ref 98–107)
CO2 SERPL-SCNC: 25 MMOL/L (ref 22–29)
CREAT SERPL-MCNC: 1.95 MG/DL (ref 0.76–1.27)
DEPRECATED RDW RBC AUTO: 50.2 FL (ref 37–54)
EGFRCR SERPLBLD CKD-EPI 2021: 37 ML/MIN/1.73
EOSINOPHIL # BLD AUTO: 0.41 10*3/MM3 (ref 0–0.4)
EOSINOPHIL NFR BLD AUTO: 4.7 % (ref 0.3–6.2)
ERYTHROCYTE [DISTWIDTH] IN BLOOD BY AUTOMATED COUNT: 15.7 % (ref 12.3–15.4)
GLUCOSE BLDC GLUCOMTR-MCNC: 167 MG/DL (ref 70–130)
GLUCOSE BLDC GLUCOMTR-MCNC: 219 MG/DL (ref 70–130)
GLUCOSE BLDC GLUCOMTR-MCNC: 317 MG/DL (ref 70–130)
GLUCOSE BLDC GLUCOMTR-MCNC: 388 MG/DL (ref 70–130)
GLUCOSE SERPL-MCNC: 118 MG/DL (ref 65–99)
HCT VFR BLD AUTO: 28.8 % (ref 37.5–51)
HGB BLD-MCNC: 8.7 G/DL (ref 13–17.7)
IMM GRANULOCYTES # BLD AUTO: 0.07 10*3/MM3 (ref 0–0.05)
IMM GRANULOCYTES NFR BLD AUTO: 0.8 % (ref 0–0.5)
LYMPHOCYTES # BLD AUTO: 2.15 10*3/MM3 (ref 0.7–3.1)
LYMPHOCYTES NFR BLD AUTO: 24.7 % (ref 19.6–45.3)
MCH RBC QN AUTO: 26.6 PG (ref 26.6–33)
MCHC RBC AUTO-ENTMCNC: 30.2 G/DL (ref 31.5–35.7)
MCV RBC AUTO: 88.1 FL (ref 79–97)
MONOCYTES # BLD AUTO: 0.87 10*3/MM3 (ref 0.1–0.9)
MONOCYTES NFR BLD AUTO: 10 % (ref 5–12)
NEUTROPHILS NFR BLD AUTO: 5.09 10*3/MM3 (ref 1.7–7)
NEUTROPHILS NFR BLD AUTO: 58.6 % (ref 42.7–76)
NRBC BLD AUTO-RTO: 0 /100 WBC (ref 0–0.2)
PLATELET # BLD AUTO: 310 10*3/MM3 (ref 140–450)
PMV BLD AUTO: 11.8 FL (ref 6–12)
POTASSIUM SERPL-SCNC: 4.2 MMOL/L (ref 3.5–5.2)
RBC # BLD AUTO: 3.27 10*6/MM3 (ref 4.14–5.8)
SODIUM SERPL-SCNC: 140 MMOL/L (ref 136–145)
TROPONIN T SERPL HS-MCNC: 49 NG/L
WBC NRBC COR # BLD: 8.69 10*3/MM3 (ref 3.4–10.8)

## 2023-09-20 PROCEDURE — 97165 OT EVAL LOW COMPLEX 30 MIN: CPT | Performed by: OCCUPATIONAL THERAPIST

## 2023-09-20 PROCEDURE — 25010000002 CEFTRIAXONE PER 250 MG: Performed by: INTERNAL MEDICINE

## 2023-09-20 PROCEDURE — 82948 REAGENT STRIP/BLOOD GLUCOSE: CPT

## 2023-09-20 PROCEDURE — 80048 BASIC METABOLIC PNL TOTAL CA: CPT | Performed by: INTERNAL MEDICINE

## 2023-09-20 PROCEDURE — 97161 PT EVAL LOW COMPLEX 20 MIN: CPT | Performed by: PHYSICAL THERAPIST

## 2023-09-20 PROCEDURE — 63710000001 INSULIN DETEMIR PER 5 UNITS: Performed by: INTERNAL MEDICINE

## 2023-09-20 PROCEDURE — 85025 COMPLETE CBC W/AUTO DIFF WBC: CPT | Performed by: INTERNAL MEDICINE

## 2023-09-20 PROCEDURE — 84484 ASSAY OF TROPONIN QUANT: CPT | Performed by: INTERNAL MEDICINE

## 2023-09-20 PROCEDURE — 63710000001 INSULIN LISPRO (HUMAN) PER 5 UNITS: Performed by: INTERNAL MEDICINE

## 2023-09-20 PROCEDURE — 25010000002 HYDROMORPHONE 1 MG/ML SOLUTION: Performed by: INTERNAL MEDICINE

## 2023-09-20 NOTE — PROGRESS NOTES
[facility]  NIMESH Parkinson APRN        Internal Medicine Progress Note    9/20/2023   10:42 CDT    Name:  Erick Luong  MRN:    [id number]     Acct:     [id number]   Room:  [address] [place] [address] Day: 0     Admit Date: 9/18/2023  6:30 PM  PCP: Del Shetty MD    Subjective:     C/C: weakness, fatigue    Interval History: Status:  improved. Up to chair. Wife at bedside. Afebrile. Maintaining adequate 02 sats on room air. Pain well controlled. No further complaints of chest pain. Counts stable. Tolerating treatment thus far.     Review of Systems   Constitutional: Positive for malaise/fatigue. Negative for chills, decreased appetite, weight gain and weight loss.   HENT:  Negative for congestion, ear discharge, hoarse voice and tinnitus.    Eyes:  Negative for blurred vision, discharge, visual disturbance and visual halos.   Cardiovascular:  Negative for chest pain, claudication, dyspnea on exertion, irregular heartbeat, leg swelling, orthopnea and paroxysmal nocturnal dyspnea.   Respiratory:  Negative for cough, shortness of breath, sputum production and wheezing.    Endocrine: Negative for cold intolerance, heat intolerance and polyuria.   Hematologic/Lymphatic: Negative for adenopathy. Does not bruise/bleed easily.   Skin:  Positive for poor wound healing. Negative for dry skin, itching and suspicious lesions.   Musculoskeletal:  Positive for neck pain. Negative for arthritis, back pain, falls, joint pain, muscle weakness and myalgias.   Gastrointestinal:  Negative for abdominal pain, constipation, diarrhea, dysphagia and hematemesis.   Genitourinary:  Negative for bladder incontinence, dysuria and frequency.   Neurological:  Positive for weakness. Negative for aphonia, disturbances in coordination and dizziness.   Psychiatric/Behavioral:  Negative for altered mental status, depression, memory loss and substance abuse. The patient does not have insomnia and is not nervous/anxious.   "      Medications:     Allergies:   Allergies   Allergen Reactions    Cefepime Hives and Anaphylaxis    Bactrim [Sulfamethoxazole-Trimethoprim] Other (See Comments)     \"RENAL FAILURE\"    Vancomycin Itching    Zolpidem Unknown - High Severity     \"makes him crazy\"    Zolpidem Tartrate Unknown - Low Severity and Provider Review Needed    Metronidazole Rash       Current Meds:   Current Facility-Administered Medications:     acetaminophen (TYLENOL) tablet 650 mg, 650 mg, Oral, Q4H PRN **OR** acetaminophen (TYLENOL) suppository 650 mg, 650 mg, Rectal, Q4H PRN, Juan Manuel Page MD    aluminum-magnesium hydroxide-simethicone (MAALOX MAX) 400-400-40 MG/5ML suspension 30 mL, 30 mL, Oral, Q4H PRN, Juan Manuel Page MD    apixaban (ELIQUIS) tablet 5 mg, 5 mg, Oral, BID With Meals, Juan Manuel Page MD    bumetanide (BUMEX) tablet 1 mg, 1 mg, Oral, Daily, Juan Manuel Page MD    busPIRone (BUSPAR) tablet 10 mg, 10 mg, Oral, Q12H PRN, Juan Manuel Page MD    calcitriol (ROCALTROL) capsule 0.5 mcg, 0.5 mcg, Oral, Daily, Juan Manuel Page MD    carvedilol (COREG) tablet 3.125 mg, 3.125 mg, Oral, BID With Meals, Juan Manuel Page MD    cefTRIAXone (ROCEPHIN) 1,000 mg in sodium chloride 0.9 % 100 mL IVPB, 1,000 mg, Intravenous, Q24H, Juan Manuel Page MD    cholecalciferol (VITAMIN D3) tablet 5,000 Units, 5,000 Units, Oral, Daily, Juan Manuel Page MD    dextrose (D50W) (25 g/50 mL) IV injection 25 g, 25 g, Intravenous, Q15 Min PRN, Juan Manuel Page MD    dextrose (GLUTOSE) oral gel 15 g, 15 g, Oral, Q15 Min PRN, Juan Manuel Page MD    docusate sodium (COLACE) capsule 200 mg, 200 mg, Oral, BID, Juan Manuel Page MD    donepezil (ARICEPT) tablet 10 mg, 10 mg, Oral, Daily, Juan Manuel Page MD    [START ON 9/25/2023] Dulaglutide solution pen-injector 4.5 mg, 4.5 mg, Subcutaneous, Weekly, Juan Manuel Page MD    empagliflozin (JARDIANCE) tablet 25 mg, 25 mg, " Oral, Daily, Juan Manuel Page MD    glucagon (GLUCAGEN) injection 1 mg, 1 mg, Intramuscular, Q15 Min PRN, Juan Manuel Page MD    HYDROmorphone (DILAUDID) injection 1 mg, 1 mg, Intravenous, Q6H PRN, Juan Manuel Page MD    insulin detemir (LEVEMIR) injection 20 Units, 20 Units, Subcutaneous, Daily, Juan Manuel Page MD    Insulin Lispro (humaLOG) injection 2-9 Units, 2-9 Units, Subcutaneous, 4x Daily AC & at Bedtime, Juan Manuel Page MD    magnesium hydroxide (MILK OF MAGNESIA) 400 MG/5ML suspension 15 mL, 15 mL, Oral, Daily PRN, Juan Manuel Page MD    metoclopramide (REGLAN) tablet 10 mg, 10 mg, Oral, TID AC, Juan Manuel Page MD    ondansetron (ZOFRAN) tablet 4 mg, 4 mg, Oral, Q6H PRN **OR** ondansetron (ZOFRAN) injection 4 mg, 4 mg, Intravenous, Q6H PRN, Juan Manuel Page MD    oxyCODONE (oxyCONTIN) 12 hr tablet 10 mg, 10 mg, Oral, Q12H, Juan Manuel Page MD    oxyCODONE (ROXICODONE) immediate release tablet 5 mg, 5 mg, Oral, Q4H PRN, Juan Manuel Page MD    pantoprazole (PROTONIX) EC tablet 40 mg, 40 mg, Oral, BID, Juan Manuel Page MD    polyethylene glycol (MIRALAX) packet 17 g, 17 g, Oral, Daily, Juan Manuel Page MD    pregabalin (LYRICA) capsule 100 mg, 100 mg, Oral, Daily, Juan Manuel Page MD    pregabalin (LYRICA) capsule 200 mg, 200 mg, Oral, Nightly, Juan Manuel Page MD    rosuvastatin (CRESTOR) tablet 40 mg, 40 mg, Oral, Nightly, Juan Manuel Page MD    saccharomyces boulardii (FLORASTOR) capsule 250 mg, 250 mg, Oral, BID, Juan Manuel Page MD    sennosides-docusate (PERICOLACE) 8.6-50 MG per tablet 1 tablet, 1 tablet, Oral, Nightly, Juan Manuel Page MD    sodium chloride 0.9 % flush 10 mL, 10 mL, Intravenous, Q12H, Juan Manuel Page MD    sodium chloride 0.9 % flush 10 mL, 10 mL, Intravenous, PRN, Juan Manuel Page MD    sodium hypochlorite (DAKIN'S 1/4 STRENGTH) 0.125 % topical solution 0.125%  solution, , Topical, Q8H, Juan Manuel Page MD    sucralfate (CARAFATE) tablet 1 g, 1 g, Oral, TID AC, Juan Manuel Page MD    tamsulosin (FLOMAX) 24 hr capsule 0.4 mg, 0.4 mg, Oral, Daily, Juan Manuel Page MD    valsartan (DIOVAN) tablet 40 mg, 40 mg, Oral, Nightly, Juan Manuel Page MD    Data:     Code Status:    There are no questions and answers to display.       Family History   Problem Relation Age of Onset    Colon cancer Father     Heart disease Father     Colon cancer Sister     Colon polyps Sister     Alzheimer's disease Mother     Coronary artery disease Sister     Coronary artery disease Sister        Social History     Socioeconomic History    Marital status:    Tobacco Use    Smoking status: Former     Types: Cigarettes     Quit date:      Years since quittin.7    Smokeless tobacco: Never    Tobacco comments:     smoked in [employer]   Vaping Use    Vaping Use: Never used   Substance and Sexual Activity    Alcohol use: No    Drug use: No    Sexual activity: Defer       Vitals:  T 97.9 P 61 R 18 Bp 111/45 Sp02 100% (room air)            I/O (24Hr):  No intake or output data in the 24 hours ending 23 1042    Labs and imaging:      Recent Results (from the past 12 hour(s))   Basic Metabolic Panel    Collection Time: 23  5:17 AM    Specimen: Blood   Result Value Ref Range    Glucose 118 (H) 65 - 99 mg/dL    BUN 21 8 - 23 mg/dL    Creatinine 1.95 (H) 0.76 - 1.27 mg/dL    Sodium 140 136 - 145 mmol/L    Potassium 4.2 3.5 - 5.2 mmol/L    Chloride 103 98 - 107 mmol/L    CO2 25.0 22.0 - 29.0 mmol/L    Calcium 8.9 8.6 - 10.5 mg/dL    BUN/Creatinine Ratio 10.8 7.0 - 25.0    Anion Gap 12.0 5.0 - 15.0 mmol/L    eGFR 37.0 (L) >60.0 mL/min/1.73   CBC Auto Differential    Collection Time: 23  5:17 AM    Specimen: Blood   Result Value Ref Range    WBC 8.69 3.40 - 10.80 10*3/mm3    RBC 3.27 (L) 4.14 - 5.80 10*6/mm3    Hemoglobin 8.7 (L) 13.0 - 17.7 g/dL     Hematocrit 28.8 (L) 37.5 - 51.0 %    MCV 88.1 79.0 - 97.0 fL    MCH 26.6 26.6 - 33.0 pg    MCHC 30.2 (L) 31.5 - 35.7 g/dL    RDW 15.7 (H) 12.3 - 15.4 %    RDW-SD 50.2 37.0 - 54.0 fl    MPV 11.8 6.0 - 12.0 fL    Platelets 310 140 - 450 10*3/mm3    Neutrophil % 58.6 42.7 - 76.0 %    Lymphocyte % 24.7 19.6 - 45.3 %    Monocyte % 10.0 5.0 - 12.0 %    Eosinophil % 4.7 0.3 - 6.2 %    Basophil % 1.2 0.0 - 1.5 %    Immature Grans % 0.8 (H) 0.0 - 0.5 %    Neutrophils, Absolute 5.09 1.70 - 7.00 10*3/mm3    Lymphocytes, Absolute 2.15 0.70 - 3.10 10*3/mm3    Monocytes, Absolute 0.87 0.10 - 0.90 10*3/mm3    Eosinophils, Absolute 0.41 (H) 0.00 - 0.40 10*3/mm3    Basophils, Absolute 0.10 0.00 - 0.20 10*3/mm3    Immature Grans, Absolute 0.07 (H) 0.00 - 0.05 10*3/mm3    nRBC 0.0 0.0 - 0.2 /100 WBC   High Sensitivity Troponin T    Collection Time: 09/20/23  5:17 AM    Specimen: Blood   Result Value Ref Range    HS Troponin T 49 (H) <15 ng/L   POC Glucose Once    Collection Time: 09/20/23  6:47 AM    Specimen: Blood   Result Value Ref Range    Glucose 167 (H) 70 - 130 mg/dL                               Physical Examination:        Physical Exam  Vitals and nursing note reviewed.   Constitutional:       Appearance: Normal appearance. He is well-developed. He is obese.   HENT:      Head: Normocephalic and atraumatic.      Right Ear: External ear normal.      Left Ear: External ear normal.      Nose: Nose normal.      Mouth/Throat:      Mouth: Mucous membranes are moist.      Pharynx: Oropharynx is clear.   Eyes:      Pupils: Pupils are equal, round, and reactive to light.   Cardiovascular:      Rate and Rhythm: Normal rate and regular rhythm. Rhythm irregular.      Heart sounds: Normal heart sounds.   Pulmonary:      Effort: Pulmonary effort is normal.      Breath sounds: Normal breath sounds.   Abdominal:      General: Bowel sounds are normal.      Palpations: Abdomen is soft.      Comments: obese   Musculoskeletal:         General:  Normal range of motion.      Cervical back: Normal range of motion and neck supple.      Comments: Generalized weakness   Skin:     General: Skin is warm and dry.      Comments: Dressing c.d.i   Neurological:      Mental Status: He is alert and oriented to person, place, and time.      Deep Tendon Reflexes: Reflexes are normal and symmetric.   Psychiatric:         Behavior: Behavior normal.         Assessment:            * No active hospital problems. *    Past Medical History:   Diagnosis Date    Arthritis     Autonomic disease     CAD (coronary artery disease) 02/06/2017    Cervical radiculopathy 09/16/2021    Chronic constipation with acute exaccerbation 05/10/2021    Coronary artery disease     Degeneration of cervical intervertebral disc 08/11/2021    Diabetes mellitus     Diabetic foot ulcer 08/31/2020    Diabetic polyneuropathy associated with type 2 diabetes mellitus 01/18/2021    Elevated cholesterol     Gastroesophageal reflux disease 05/13/2019    Headache     HTN (hypertension), benign 05/03/2017    Hyperlipidemia     Hypertension     Mixed hyperlipidemia 02/07/2017    Multiple lung nodules 01/26/2020    5mm, 9 mm RLL identified 1/2020, not present 10/2019.    Myocardial infarction     Osteomyelitis 01/22/2020    Osteomyelitis of fifth toe of right foot 10/07/2019    Pancreatitis     Persistent insomnia 01/20/2020    Renal disorder     Sleep apnea 02/06/2017    Sleep apnea with use of continuous positive airway pressure (CPAP)     NON-COMPLIANT    Slow transit constipation 01/16/2019    Spinal stenosis in cervical region 09/16/2021    Vitamin D deficiency 03/02/2021        Plan:        Proteus wound infection s/p debridement  New onset atrial fibrillation  Acute on chronic diastolic CHF  CKD3  Multifactorial anemia  DM2 with hyperglycemia on long term insulin  BRITTNI  Chronic pain syndrome  Chronic neck pain with radiculopathy  Diabetic peripheral neuropathy  Hx CAD  GERD  Possible gastroparesis  Continue  current treatment. Monitor counts. Increase activity. Labs in am. Wound care per wound care team. Continue IV antibiotics as tolerated. Appreciate nephrology evaluation and treatment. Continue glycemic control efforts.       Electronically signed by SUSAN Muñoz on 9/20/2023 at 10:42 CDT     I have discussed the care of Erick Luong, including pertinent history and exam findings, with the nurse practitioner.    I have seen and examined the patient and the key elements of all parts of the encounter have been performed by me.  I agree with the assessment, plan and orders as documented by SUSAN Haney, after I modified the exam findings and the plan of treatments and the final version is my approved version of the assessment.        Electronically signed by Juan Manuel Page MD on 9/20/2023 at 20:49 CDT

## 2023-09-20 NOTE — PROGRESS NOTES
Nephrology ([facility]) Progress Note      Patient:  Erick Luong  YOB: 1955  Date of Service: 9/20/2023  MRN: [id number]   Acct: [id number]   Primary Care Physician: Del Shetty MD  Advance Directive:   There are no questions and answers to display.     Admit Date: 9/18/2023       Hospital Day: 0  Referring Provider: Juan Manuel Page MD      Patient personally seen and examined.  Complete chart including Consults, Notes, Operative Reports, Labs, Cardiology, and Radiology studies reviewed as able.    Chief complaint: Abnormal labs.    Subjective:  Erick Luong is a 68 y.o. male  whom we were consulted for chronic kidney disease stage IIIb.  He has stage IIIb chronic kidney disease baseline, follows me in the office as he has diabetic nephropathy.  He has history of insulin-dependent type 2 diabetes, hypertension, abdominal obesity, obstructive sleep apnea, diabetic foot ulcer and coronary artery disease.  He was accepted as a transfer from [facility].  [place] [facility] and extensive debridement of left foot wound at plantar aspect.  Postoperative wound measuring 6 x 17 x 4 cm with exposed bone.  Surgical culture returned positive for Proteus Mirabills.  Patient needed long-term IV antibiotics.  He is now admitted for wound care, long-term IV antibiotics, chronic kidney disease and treatment of diastolic congestive heart failure.  Patient also has history of gastroparesis.       This morning patient feels well.  He denies any shortness of breath.  His wife is at the bedside.      Allergies:  Cefepime, Bactrim [sulfamethoxazole-trimethoprim], Vancomycin, Zolpidem, Zolpidem tartrate, and Metronidazole    Home Meds:  Medications Prior to Admission   Medication Sig Dispense Refill Last Dose    amitriptyline (ELAVIL) 25 MG tablet Take 2 tablets by mouth Daily at bedtime. 60 tablet 1     amoxicillin-clavulanate (Augmentin)  500-125 MG per tablet Take 1 tablet by mouth every 12 hours with meals for 7 days. 14 tablet 0     ascorbic acid (VITAMIN C) 1000 MG tablet Take 1 tablet by mouth Daily. 30 tablet 3     Aspirin 81 MG capsule Take 81 mg by mouth Daily.       bumetanide (BUMEX) 2 MG tablet Take 1 tablet by mouth Daily. Take 2 tablets in morning;1 tablet at night       busPIRone (BUSPAR) 10 MG tablet Take 1 tablet by mouth 2 (Two) Times a Day As Needed. 100 tablet 3     calcitriol (ROCALTROL) 0.5 MCG capsule Take 1 capsule by mouth Daily. 90 capsule 4     carvedilol (COREG) 6.25 MG tablet Take 1 tablet by mouth 2 (Two) Times a Day. 180 tablet 4     Cholecalciferol (D-5000) 125 MCG (5000 UT) tablet Take 1 tablet by mouth Daily Before Lunch. 30 tablet 2     cloNIDine (CATAPRES) 0.1 MG tablet Take 1 tablet by mouth Every 12 (Twelve) Hours. 60 tablet 2     Diclofenac Sodium (VOLTAREN) 1 % gel gel Apply 2 g topically to the appropriate area as directed 4 (Four) Times a Day As Needed. 300 g 11     donepezil (ARICEPT) 10 MG tablet Take 1 tablet by mouth Daily. 90 tablet 4     Dulaglutide (Trulicity) 4.5 MG/0.5ML solution pen-injector Inject 0.5 mL under the skin into the appropriate area as directed 1 (One) Time Per Week. 2 mL 11     empagliflozin (JARDIANCE) 25 MG tablet tablet Take 1 tablet by mouth Daily. 30 tablet 2     HYDROcodone-acetaminophen (NORCO) 7.5-325 MG per tablet Take 1 tablet by mouth 2 (Two) Times a Day As Needed. 40 tablet 0     Insulin Regular Human, Conc, (HumuLIN R U-500 KwikPen) 500 UNIT/ML solution pen-injector CONCENTRATED injection Inject 120 Units under the skin into the appropriate area as directed 2 (Two) Times a Day with breakfast and dinner. (Patient taking differently: Inject 120 Units under the skin into the appropriate area as directed 3 (Three) Times a Day Before Meals.) 18 mL 5     midodrine (PROAMATINE) 10 MG tablet Take 1 tablet by mouth 3 (Three) Times a Day. 270 tablet 4     ondansetron ODT (ZOFRAN-ODT)  8 MG disintegrating tablet Place 1 tablet under tongue 3 times a day as needed. 25 tablet 1     pantoprazole (Protonix) 40 MG EC tablet Take 1 tablet by mouth 2 (Two) Times a Day. 180 tablet 4     polyethylene glycol (MIRALAX) 17 g packet Take 17 g by mouth Daily. Obtain OTC       pregabalin (LYRICA) 100 MG capsule Take 1 capsule by mouth Daily With Breakfast AND 2 capsules Every Night. 90 capsule 2     rosuvastatin (CRESTOR) 40 MG tablet Take 1 tablet by mouth Every Night. 90 tablet 3     sennosides-docusate (PERICOLACE) 8.6-50 MG per tablet Take 1 tablet by mouth Every Night. Obtain OTC       sennosides-docusate (PERICOLACE) 8.6-50 MG per tablet Take 1 tablet by mouth every night at bedtime. 30 tablet 2     sodium hypochlorite (DAKIN'S 1/4 STRENGTH) 0.125 % solution topical solution 0.125% Apply to affected area twice daily 473 mL 3     tamsulosin (FLOMAX) 0.4 MG capsule 24 hr capsule Take 1 capsule by mouth Daily. 90 capsule 1        Medicines:  Current Facility-Administered Medications   Medication Dose Route Frequency Provider Last Rate Last Admin    acetaminophen (TYLENOL) tablet 650 mg  650 mg Oral Q4H PRN Juan Manuel Page MD        Or    acetaminophen (TYLENOL) suppository 650 mg  650 mg Rectal Q4H PRN Juan Manuel Page MD        aluminum-magnesium hydroxide-simethicone (MAALOX MAX) 400-400-40 MG/5ML suspension 30 mL  30 mL Oral Q4H PRN Juan Manuel Page MD        apixaban (ELIQUIS) tablet 5 mg  5 mg Oral BID With Meals Juan Manuel Page MD        bumetanide (BUMEX) tablet 1 mg  1 mg Oral Daily Juan Manuel Page MD        busPIRone (BUSPAR) tablet 10 mg  10 mg Oral Q12H PRN Juan Manuel Page MD        calcitriol (ROCALTROL) capsule 0.5 mcg  0.5 mcg Oral Daily Juan Manuel Page MD        carvedilol (COREG) tablet 3.125 mg  3.125 mg Oral BID With Meals Juan Manuel Page MD        cefTRIAXone (ROCEPHIN) 1,000 mg in sodium chloride 0.9 % 100 mL IVPB  1,000 mg Intravenous  Q24H Juan Manuel Page MD        cholecalciferol (VITAMIN D3) tablet 5,000 Units  5,000 Units Oral Daily Juan Manuel Page MD        dextrose (D50W) (25 g/50 mL) IV injection 25 g  25 g Intravenous Q15 Min PRN Juan Manuel Page MD        dextrose (GLUTOSE) oral gel 15 g  15 g Oral Q15 Min PRN Juan Manuel Page MD        docusate sodium (COLACE) capsule 200 mg  200 mg Oral BID Juan Manuel Page MD        donepezil (ARICEPT) tablet 10 mg  10 mg Oral Daily Juan Manuel Page MD        [START ON 9/25/2023] Dulaglutide solution pen-injector 4.5 mg  4.5 mg Subcutaneous Weekly Juan Manuel Page MD        empagliflozin (JARDIANCE) tablet 25 mg  25 mg Oral Daily Juan Manuel Page MD        glucagon (GLUCAGEN) injection 1 mg  1 mg Intramuscular Q15 Min PRN Juan Manuel Page MD        HYDROmorphone (DILAUDID) injection 1 mg  1 mg Intravenous Q6H PRN Juan Manuel Page MD        insulin detemir (LEVEMIR) injection 20 Units  20 Units Subcutaneous Daily Juan Manuel Page MD        Insulin Lispro (humaLOG) injection 2-9 Units  2-9 Units Subcutaneous 4x Daily AC & at Bedtime Juan Manuel Page MD        magnesium hydroxide (MILK OF MAGNESIA) 400 MG/5ML suspension 15 mL  15 mL Oral Daily PRN Juan Manuel Page MD        metoclopramide (REGLAN) tablet 10 mg  10 mg Oral TID AC Juan Manuel Page MD        ondansetron (ZOFRAN) tablet 4 mg  4 mg Oral Q6H PRN Juan Manuel Page MD        Or    ondansetron (ZOFRAN) injection 4 mg  4 mg Intravenous Q6H PRN Juan Manuel Page MD        oxyCODONE (oxyCONTIN) 12 hr tablet 10 mg  10 mg Oral Q12H Juan Manuel Page MD        oxyCODONE (ROXICODONE) immediate release tablet 5 mg  5 mg Oral Q4H PRN Juan Manuel Page MD        pantoprazole (PROTONIX) EC tablet 40 mg  40 mg Oral BID Juan Manuel Page MD        polyethylene glycol (MIRALAX) packet 17 g  17 g Oral Daily Juan Manuel Page MD        pregabalin  (LYRICA) capsule 100 mg  100 mg Oral Daily Juan Manuel Page MD        pregabalin (LYRICA) capsule 200 mg  200 mg Oral Nightly Juan Manuel Page MD        rosuvastatin (CRESTOR) tablet 40 mg  40 mg Oral Nightly Juan Manuel Page MD        saccharomyces boulardii (FLORASTOR) capsule 250 mg  250 mg Oral BID Juan Manuel Page MD        sennosides-docusate (PERICOLACE) 8.6-50 MG per tablet 1 tablet  1 tablet Oral Nightly Juan Manuel Page MD        sodium chloride 0.9 % flush 10 mL  10 mL Intravenous Q12H Juan Manuel Page MD        sodium chloride 0.9 % flush 10 mL  10 mL Intravenous PRN Juan Manuel Page MD        sodium hypochlorite (DAKIN'S 1/4 STRENGTH) 0.125 % topical solution 0.125% solution   Topical Q8H Juan Manuel Page MD        sucralfate (CARAFATE) tablet 1 g  1 g Oral TID AC Juan Manuel Page MD        tamsulosin (FLOMAX) 24 hr capsule 0.4 mg  0.4 mg Oral Daily Juan Manuel Page MD        valsartan (DIOVAN) tablet 40 mg  40 mg Oral Nightly Juan Manuel Page MD           Past Medical History:  Past Medical History:   Diagnosis Date    Arthritis     Autonomic disease     CAD (coronary artery disease) 02/06/2017    Cervical radiculopathy 09/16/2021    Chronic constipation with acute exaccerbation 05/10/2021    Coronary artery disease     Degeneration of cervical intervertebral disc 08/11/2021    Diabetes mellitus     Diabetic foot ulcer 08/31/2020    Diabetic polyneuropathy associated with type 2 diabetes mellitus 01/18/2021    Elevated cholesterol     Gastroesophageal reflux disease 05/13/2019    Headache     HTN (hypertension), benign 05/03/2017    Hyperlipidemia     Hypertension     Mixed hyperlipidemia 02/07/2017    Multiple lung nodules 01/26/2020    5mm, 9 mm RLL identified 1/2020, not present 10/2019.    Myocardial infarction     Osteomyelitis 01/22/2020    Osteomyelitis of fifth toe of right foot 10/07/2019    Pancreatitis     Persistent insomnia  01/20/2020    Renal disorder     Sleep apnea 02/06/2017    Sleep apnea with use of continuous positive airway pressure (CPAP)     NON-COMPLIANT    Slow transit constipation 01/16/2019    Spinal stenosis in cervical region 09/16/2021    Vitamin D deficiency 03/02/2021       Past Surgical History:  Past Surgical History:   Procedure Laterality Date    ABDOMINAL SURGERY      AMPUTATION FOOT / TOE Left 10/2021    5th digit     ANTERIOR CERVICAL DISCECTOMY W/ FUSION N/A 8/5/2022    Procedure: CERVICAL DISCECTOMY ANTERIOR WITH FUSION C5-6 with possible plating of C5-7 with neuromonitoring and 1 c-arm;  Surgeon: Karel Soliz MD;  Location:  [facility] OR;  Service: Neurosurgery;  Laterality: N/A;    APPENDECTOMY      BACK SURGERY      CARDIAC CATHETERIZATION Left 02/08/2021    Procedure: Left Heart Cath w poss intervention left anatomical snuff box acess;  Surgeon: Omkar Charles DO;  Location:  [facility] CATH INVASIVE LOCATION;  Service: Cardiology;  Laterality: Left;    CARDIAC SURGERY      CATARACT EXTRACTION      CERVICAL SPINE SURGERY      COLONOSCOPY N/A 01/31/2017    Normal exam repeat in 5 years    COLONOSCOPY N/A 02/11/2019    Mild acute inflammation    COLONOSCOPY W/ POLYPECTOMY  03/04/2014    Hyperplastic polyp    CORONARY ARTERY BYPASS GRAFT  10/2015    ENDOSCOPY  04/13/2011    Gastritis with hemorrhage    ENDOSCOPY N/A 05/05/2017    Normal exam    ENDOSCOPY N/A 02/11/2019    Gastritis    ENDOSCOPY N/A 09/01/2020    Non-erosive gastritis with hemorrhage    ENDOSCOPY N/A 02/10/2021    Esophagitis    FOOT SURGERY Left     INCISION AND DRAINAGE OF WOUND Left 09/2007    spider bite       Family History  Family History   Problem Relation Age of Onset    Colon cancer Father     Heart disease Father     Colon cancer Sister     Colon polyps Sister     Alzheimer's disease Mother     Coronary artery disease Sister     Coronary artery disease Sister        Social History  Social History     Socioeconomic History     Marital status:    Tobacco Use    Smoking status: Former     Types: Cigarettes     Quit date:      Years since quittin.7    Smokeless tobacco: Never    Tobacco comments:     smoked in highschool   Vaping Use    Vaping Use: Never used   Substance and Sexual Activity    Alcohol use: No    Drug use: No    Sexual activity: Defer       Review of Systems:  History obtained from chart review and the patient  General ROS: No fever or chills  Respiratory ROS: No cough, shortness of breath, wheezing  Cardiovascular ROS: No chest pain or palpitations  Gastrointestinal ROS: No abdominal pain or melena  Genito-Urinary ROS: No dysuria or hematuria  Psych ROS: No anxiety and depression  14 point ROS reviewed with the patient and negative except as noted above and in the HPI unless unable to obtain.    Objective:  Blood pressure: 111/50 mmHg  Heart rate is 90 bpm  O2 saturation 100%.  General: awake/alert   HEENT: Normocephalic atraumatic head  Neck: Supple with no JVD accorded wheeze.  Chest:  clear to auscultation bilaterally without respiratory distress  CVS: regular rate and rhythm  Abdominal: soft, nontender, positive bowel sounds  Extremities:  Left foot diabetic ulcer at plantar aspect  Skin: warm and dry without rash      Labs:  Results from last 7 days   Lab Units 23  0517 23  0629   WBC 10*3/mm3 8.69 10.86*   HEMOGLOBIN g/dL 8.7* 8.9*   HEMATOCRIT % 28.8* 30.2*   PLATELETS 10*3/mm3 310 381         Results from last 7 days   Lab Units 23  0517 23  0629   SODIUM mmol/L 140 140   POTASSIUM mmol/L 4.2 4.3   CHLORIDE mmol/L 103 105   CO2 mmol/L 25.0 23.0   BUN mg/dL 21 23   CREATININE mg/dL 1.95* 1.87*   CALCIUM mg/dL 8.9 9.2   EGFR mL/min/1.73 37.0* 38.9*   BILIRUBIN mg/dL  --  0.2   ALK PHOS U/L  --  106   ALT (SGPT) U/L  --  9   AST (SGOT) U/L  --  12   GLUCOSE mg/dL 118* 112*       Radiology:   Imaging Results (Last 72 Hours)       ** No results found for the last 72 hours. **             Culture:  No results found for: BLOODCX, URINECX, WOUNDCX, MRSACX, RESPCX, STOOLCX      Assessment   1.  Stage IIIb chronic kidney disease baseline.  2.  Type II diabetic nephropathy.  3.  Status post debridement of diabetic foot ulcer  4.  Chronic diastolic CHF.  5.  Proteus wound infection  6.  Insulin-dependent type 2 diabetes.  7.  History of gastroparesis.  8.  Secondary hyperparathyroidism.  9.  Anemia of CKD.    Plan:  1.  Decrease Bumex to 1 mg p.o. daily.  2.  Continue calcitriol.  3.  Baseline iron studies.  4.  Midodrine as needed for orthostatic hypotension      Brian Lomas MD  9/20/2023  13:56 CDT     no

## 2023-09-21 LAB
ANION GAP SERPL CALCULATED.3IONS-SCNC: 12 MMOL/L (ref 5–15)
BASOPHILS # BLD AUTO: 0.1 10*3/MM3 (ref 0–0.2)
BASOPHILS NFR BLD AUTO: 1.3 % (ref 0–1.5)
BUN SERPL-MCNC: 24 MG/DL (ref 8–23)
BUN/CREAT SERPL: 11.3 (ref 7–25)
CALCIUM SPEC-SCNC: 9.1 MG/DL (ref 8.6–10.5)
CHLORIDE SERPL-SCNC: 101 MMOL/L (ref 98–107)
CO2 SERPL-SCNC: 24 MMOL/L (ref 22–29)
CREAT SERPL-MCNC: 2.12 MG/DL (ref 0.76–1.27)
CRP SERPL-MCNC: 2.35 MG/DL (ref 0–0.5)
DEPRECATED RDW RBC AUTO: 50.9 FL (ref 37–54)
EGFRCR SERPLBLD CKD-EPI 2021: 33.5 ML/MIN/1.73
EOSINOPHIL # BLD AUTO: 0.36 10*3/MM3 (ref 0–0.4)
EOSINOPHIL NFR BLD AUTO: 4.8 % (ref 0.3–6.2)
ERYTHROCYTE [DISTWIDTH] IN BLOOD BY AUTOMATED COUNT: 15.7 % (ref 12.3–15.4)
ERYTHROCYTE [SEDIMENTATION RATE] IN BLOOD: 59 MM/HR (ref 0–20)
GLUCOSE BLDC GLUCOMTR-MCNC: 215 MG/DL (ref 70–130)
GLUCOSE BLDC GLUCOMTR-MCNC: 233 MG/DL (ref 70–130)
GLUCOSE BLDC GLUCOMTR-MCNC: 264 MG/DL (ref 70–130)
GLUCOSE BLDC GLUCOMTR-MCNC: 353 MG/DL (ref 70–130)
GLUCOSE SERPL-MCNC: 273 MG/DL (ref 65–99)
HCT VFR BLD AUTO: 29.4 % (ref 37.5–51)
HGB BLD-MCNC: 8.9 G/DL (ref 13–17.7)
IMM GRANULOCYTES # BLD AUTO: 0.03 10*3/MM3 (ref 0–0.05)
IMM GRANULOCYTES NFR BLD AUTO: 0.4 % (ref 0–0.5)
IRON 24H UR-MRATE: 30 MCG/DL (ref 59–158)
IRON SATN MFR SERPL: 11 % (ref 20–50)
LYMPHOCYTES # BLD AUTO: 1.76 10*3/MM3 (ref 0.7–3.1)
LYMPHOCYTES NFR BLD AUTO: 23.6 % (ref 19.6–45.3)
MCH RBC QN AUTO: 26.9 PG (ref 26.6–33)
MCHC RBC AUTO-ENTMCNC: 30.3 G/DL (ref 31.5–35.7)
MCV RBC AUTO: 88.8 FL (ref 79–97)
MONOCYTES # BLD AUTO: 0.52 10*3/MM3 (ref 0.1–0.9)
MONOCYTES NFR BLD AUTO: 7 % (ref 5–12)
NEUTROPHILS NFR BLD AUTO: 4.68 10*3/MM3 (ref 1.7–7)
NEUTROPHILS NFR BLD AUTO: 62.9 % (ref 42.7–76)
NRBC BLD AUTO-RTO: 0 /100 WBC (ref 0–0.2)
PLATELET # BLD AUTO: 328 10*3/MM3 (ref 140–450)
PMV BLD AUTO: 11.7 FL (ref 6–12)
POTASSIUM SERPL-SCNC: 4.8 MMOL/L (ref 3.5–5.2)
RBC # BLD AUTO: 3.31 10*6/MM3 (ref 4.14–5.8)
SODIUM SERPL-SCNC: 137 MMOL/L (ref 136–145)
TIBC SERPL-MCNC: 265 MCG/DL (ref 298–536)
TRANSFERRIN SERPL-MCNC: 178 MG/DL (ref 200–360)
WBC NRBC COR # BLD: 7.45 10*3/MM3 (ref 3.4–10.8)

## 2023-09-21 PROCEDURE — 85025 COMPLETE CBC W/AUTO DIFF WBC: CPT | Performed by: INTERNAL MEDICINE

## 2023-09-21 PROCEDURE — 25010000002 HYDROMORPHONE 1 MG/ML SOLUTION: Performed by: INTERNAL MEDICINE

## 2023-09-21 PROCEDURE — 63710000001 INSULIN LISPRO (HUMAN) PER 5 UNITS: Performed by: INTERNAL MEDICINE

## 2023-09-21 PROCEDURE — 97535 SELF CARE MNGMENT TRAINING: CPT

## 2023-09-21 PROCEDURE — 25010000002 CEFTRIAXONE PER 250 MG: Performed by: INTERNAL MEDICINE

## 2023-09-21 PROCEDURE — 63710000001 INSULIN DETEMIR PER 5 UNITS: Performed by: INTERNAL MEDICINE

## 2023-09-21 PROCEDURE — 86140 C-REACTIVE PROTEIN: CPT | Performed by: INTERNAL MEDICINE

## 2023-09-21 PROCEDURE — 85652 RBC SED RATE AUTOMATED: CPT | Performed by: INTERNAL MEDICINE

## 2023-09-21 PROCEDURE — 80048 BASIC METABOLIC PNL TOTAL CA: CPT | Performed by: INTERNAL MEDICINE

## 2023-09-21 PROCEDURE — 82948 REAGENT STRIP/BLOOD GLUCOSE: CPT

## 2023-09-21 PROCEDURE — 25010000002 NA FERRIC GLUC CPLX PER 12.5 MG: Performed by: INTERNAL MEDICINE

## 2023-09-21 PROCEDURE — 83540 ASSAY OF IRON: CPT | Performed by: INTERNAL MEDICINE

## 2023-09-21 PROCEDURE — 97530 THERAPEUTIC ACTIVITIES: CPT

## 2023-09-21 PROCEDURE — 84466 ASSAY OF TRANSFERRIN: CPT | Performed by: INTERNAL MEDICINE

## 2023-09-21 NOTE — PROGRESS NOTES
Nephrology ([facility]) Progress Note      Patient:  Erick Luong  YOB: 1955  Date of Service: 9/21/2023  MRN: [id number]   Acct: [id number]   Primary Care Physician: Del Shetty MD  Advance Directive:   There are no questions and answers to display.     Admit Date: 9/18/2023       Hospital Day: 0  Referring Provider: Juan Manuel Page MD      Patient personally seen and examined.  Complete chart including Consults, Notes, Operative Reports, Labs, Cardiology, and Radiology studies reviewed as able.    Chief complaint: Abnormal labs.    Subjective:  Erick Luong is a 68 y.o. male  whom we were consulted for chronic kidney disease stage IIIb.  He has stage IIIb chronic kidney disease baseline, follows me in the office as he has diabetic nephropathy.  He has history of insulin-dependent type 2 diabetes, hypertension, abdominal obesity, obstructive sleep apnea, diabetic foot ulcer and coronary artery disease.  He was accepted as a transfer from [facility].  [place] [facility] [place] and extensive debridement of left foot wound at plantar aspect.  Postoperative wound measuring 6 x 17 x 4 cm with exposed bone.  Surgical culture returned positive for Proteus Mirabills.  Patient needed long-term IV antibiotics.  He is now admitted for wound care, long-term IV antibiotics, chronic kidney disease and treatment of diastolic congestive heart failure.  Patient also has history of gastroparesis.       This morning patient is feeling weak.  He denies any shortness of breath.  He denies any swelling of legs.  His renal function continues to worsen.      Allergies:  Cefepime, Bactrim [sulfamethoxazole-trimethoprim], Vancomycin, Zolpidem, Zolpidem tartrate, and Metronidazole    Home Meds:  Medications Prior to Admission   Medication Sig Dispense Refill Last Dose    amitriptyline (ELAVIL) 25 MG tablet Take 2 tablets by mouth Daily at bedtime. 60  tablet 1     amoxicillin-clavulanate (Augmentin) 500-125 MG per tablet Take 1 tablet by mouth every 12 hours with meals for 7 days. 14 tablet 0     ascorbic acid (VITAMIN C) 1000 MG tablet Take 1 tablet by mouth Daily. 30 tablet 3     Aspirin 81 MG capsule Take 81 mg by mouth Daily.       bumetanide (BUMEX) 2 MG tablet Take 1 tablet by mouth Daily. Take 2 tablets in morning;1 tablet at night       busPIRone (BUSPAR) 10 MG tablet Take 1 tablet by mouth 2 (Two) Times a Day As Needed. 100 tablet 3     calcitriol (ROCALTROL) 0.5 MCG capsule Take 1 capsule by mouth Daily. 90 capsule 4     carvedilol (COREG) 6.25 MG tablet Take 1 tablet by mouth 2 (Two) Times a Day. 180 tablet 4     Cholecalciferol (D-5000) 125 MCG (5000 UT) tablet Take 1 tablet by mouth Daily Before Lunch. 30 tablet 2     cloNIDine (CATAPRES) 0.1 MG tablet Take 1 tablet by mouth Every 12 (Twelve) Hours. 60 tablet 2     Diclofenac Sodium (VOLTAREN) 1 % gel gel Apply 2 g topically to the appropriate area as directed 4 (Four) Times a Day As Needed. 300 g 11     donepezil (ARICEPT) 10 MG tablet Take 1 tablet by mouth Daily. 90 tablet 4     Dulaglutide (Trulicity) 4.5 MG/0.5ML solution pen-injector Inject 0.5 mL under the skin into the appropriate area as directed 1 (One) Time Per Week. 2 mL 11     empagliflozin (JARDIANCE) 25 MG tablet tablet Take 1 tablet by mouth Daily. 30 tablet 2     HYDROcodone-acetaminophen (NORCO) 7.5-325 MG per tablet Take 1 tablet by mouth 2 (Two) Times a Day As Needed. 40 tablet 0     Insulin Regular Human, Conc, (HumuLIN R U-500 KwikPen) 500 UNIT/ML solution pen-injector CONCENTRATED injection Inject 120 Units under the skin into the appropriate area as directed 2 (Two) Times a Day with breakfast and dinner. (Patient taking differently: Inject 120 Units under the skin into the appropriate area as directed 3 (Three) Times a Day Before Meals.) 18 mL 5     midodrine (PROAMATINE) 10 MG tablet Take 1 tablet by mouth 3 (Three) Times a  Day. 270 tablet 4     ondansetron ODT (ZOFRAN-ODT) 8 MG disintegrating tablet Place 1 tablet under tongue 3 times a day as needed. 25 tablet 1     pantoprazole (Protonix) 40 MG EC tablet Take 1 tablet by mouth 2 (Two) Times a Day. 180 tablet 4     polyethylene glycol (MIRALAX) 17 g packet Take 17 g by mouth Daily. Obtain OTC       pregabalin (LYRICA) 100 MG capsule Take 1 capsule by mouth Daily With Breakfast AND 2 capsules Every Night. 90 capsule 2     rosuvastatin (CRESTOR) 40 MG tablet Take 1 tablet by mouth Every Night. 90 tablet 3     sennosides-docusate (PERICOLACE) 8.6-50 MG per tablet Take 1 tablet by mouth Every Night. Obtain OTC       sennosides-docusate (PERICOLACE) 8.6-50 MG per tablet Take 1 tablet by mouth every night at bedtime. 30 tablet 2     sodium hypochlorite (DAKIN'S 1/4 STRENGTH) 0.125 % solution topical solution 0.125% Apply to affected area twice daily 473 mL 3     tamsulosin (FLOMAX) 0.4 MG capsule 24 hr capsule Take 1 capsule by mouth Daily. 90 capsule 1        Medicines:  Current Facility-Administered Medications   Medication Dose Route Frequency Provider Last Rate Last Admin    acetaminophen (TYLENOL) tablet 650 mg  650 mg Oral Q4H PRN Juan Manuel Page MD        Or    acetaminophen (TYLENOL) suppository 650 mg  650 mg Rectal Q4H PRN Juan Manuel Page MD        aluminum-magnesium hydroxide-simethicone (MAALOX MAX) 400-400-40 MG/5ML suspension 30 mL  30 mL Oral Q4H PRN Juan Manuel Page MD        apixaban (ELIQUIS) tablet 5 mg  5 mg Oral BID With Meals Juan Manuel Page MD        bumetanide (BUMEX) tablet 1 mg  1 mg Oral Daily Juan Manuel Page MD        busPIRone (BUSPAR) tablet 10 mg  10 mg Oral Q12H PRN Juan Manuel Paeg MD        calcitriol (ROCALTROL) capsule 0.5 mcg  0.5 mcg Oral Daily Juan Manuel Page MD        carvedilol (COREG) tablet 3.125 mg  3.125 mg Oral BID With Meals Juan Manuel Page MD        cefTRIAXone (ROCEPHIN) 1,000 mg in  sodium chloride 0.9 % 100 mL IVPB  1,000 mg Intravenous Q24H Juan Manuel Page MD        cholecalciferol (VITAMIN D3) tablet 5,000 Units  5,000 Units Oral Daily Juan Manuel Page MD        dextrose (D50W) (25 g/50 mL) IV injection 25 g  25 g Intravenous Q15 Min PRN Juan Manuel Page MD        dextrose (GLUTOSE) oral gel 15 g  15 g Oral Q15 Min PRN Juan Manuel Page MD        docusate sodium (COLACE) capsule 200 mg  200 mg Oral BID Juan Manuel Page MD        donepezil (ARICEPT) tablet 10 mg  10 mg Oral Daily Juan Manuel Page MD        [START ON 9/25/2023] Dulaglutide solution pen-injector 4.5 mg  4.5 mg Subcutaneous Weekly Juan Manuel Page MD        empagliflozin (JARDIANCE) tablet 25 mg  25 mg Oral Daily Juan Manuel Page MD        glucagon (GLUCAGEN) injection 1 mg  1 mg Intramuscular Q15 Min PRN Juan Manuel Page MD        HYDROmorphone (DILAUDID) injection 1 mg  1 mg Intravenous Q6H PRN Juan Manuel Page MD        insulin detemir (LEVEMIR) injection 20 Units  20 Units Subcutaneous Daily Juan Manuel Page MD        Insulin Lispro (humaLOG) injection 2-9 Units  2-9 Units Subcutaneous 4x Daily AC & at Bedtime Juan Manuel Page MD        Lidocaine HCl 4 % gel gel 1 application   1 application  Topical PRN Juan Manuel Page MD        magnesium hydroxide (MILK OF MAGNESIA) 400 MG/5ML suspension 15 mL  15 mL Oral Daily PRN Juan Manuel Page MD        metoclopramide (REGLAN) tablet 10 mg  10 mg Oral TID AC Juan Manuel Page MD        ondansetron (ZOFRAN) tablet 4 mg  4 mg Oral Q6H PRN Juan Manuel Page MD        Or    ondansetron (ZOFRAN) injection 4 mg  4 mg Intravenous Q6H PRN Juan Manuel Page MD        oxyCODONE (oxyCONTIN) 12 hr tablet 10 mg  10 mg Oral Q12H Juan Manuel Page MD        oxyCODONE (ROXICODONE) immediate release tablet 5 mg  5 mg Oral Q4H PRN Juan Manuel Page MD        pantoprazole (PROTONIX) EC tablet 40  mg  40 mg Oral BID Juan Manuel Page MD        polyethylene glycol (MIRALAX) packet 17 g  17 g Oral Daily Juan Manuel Page MD        pregabalin (LYRICA) capsule 100 mg  100 mg Oral Daily Juan Manuel Page MD        pregabalin (LYRICA) capsule 200 mg  200 mg Oral Nightly Juan Manuel Page MD        rosuvastatin (CRESTOR) tablet 40 mg  40 mg Oral Nightly Juan Manuel Page MD        saccharomyces boulardii (FLORASTOR) capsule 250 mg  250 mg Oral BID Juan Manuel Page MD        sennosides-docusate (PERICOLACE) 8.6-50 MG per tablet 1 tablet  1 tablet Oral Nightly Juan Manuel Page MD        sodium chloride 0.9 % flush 10 mL  10 mL Intravenous Q12H Juan Manuel Page MD        sodium chloride 0.9 % flush 10 mL  10 mL Intravenous PRN Juan Manuel Page MD        sodium hypochlorite (DAKIN'S 1/4 STRENGTH) 0.125 % topical solution 0.125% solution   Topical Q8H Juan Manuel Page MD        sucralfate (CARAFATE) tablet 1 g  1 g Oral TID AC Juan Manuel Page MD        tamsulosin (FLOMAX) 24 hr capsule 0.4 mg  0.4 mg Oral Daily Juan Manuel Page MD        valsartan (DIOVAN) tablet 40 mg  40 mg Oral Nightly Juan Manuel Page MD           Past Medical History:  Past Medical History:   Diagnosis Date    Arthritis     Autonomic disease     CAD (coronary artery disease) 02/06/2017    Cervical radiculopathy 09/16/2021    Chronic constipation with acute exaccerbation 05/10/2021    Coronary artery disease     Degeneration of cervical intervertebral disc 08/11/2021    Diabetes mellitus     Diabetic foot ulcer 08/31/2020    Diabetic polyneuropathy associated with type 2 diabetes mellitus 01/18/2021    Elevated cholesterol     Gastroesophageal reflux disease 05/13/2019    Headache     HTN (hypertension), benign 05/03/2017    Hyperlipidemia     Hypertension     Mixed hyperlipidemia 02/07/2017    Multiple lung nodules 01/26/2020    5mm, 9 mm RLL identified 1/2020, not present  10/2019.    Myocardial infarction     Osteomyelitis 01/22/2020    Osteomyelitis of fifth toe of right foot 10/07/2019    Pancreatitis     Persistent insomnia 01/20/2020    Renal disorder     Sleep apnea 02/06/2017    Sleep apnea with use of continuous positive airway pressure (CPAP)     NON-COMPLIANT    Slow transit constipation 01/16/2019    Spinal stenosis in cervical region 09/16/2021    Vitamin D deficiency 03/02/2021       Past Surgical History:  Past Surgical History:   Procedure Laterality Date    ABDOMINAL SURGERY      AMPUTATION FOOT / TOE Left 10/2021    5th digit     ANTERIOR CERVICAL DISCECTOMY W/ FUSION N/A 8/5/2022    Procedure: CERVICAL DISCECTOMY ANTERIOR WITH FUSION C5-6 with possible plating of C5-7 with neuromonitoring and 1 c-arm;  Surgeon: Karel Soliz MD;  Location:  [facility] OR;  Service: Neurosurgery;  Laterality: N/A;    APPENDECTOMY      BACK SURGERY      CARDIAC CATHETERIZATION Left 02/08/2021    Procedure: Left Heart Cath w poss intervention left anatomical snuff box acess;  Surgeon: Omkar Charles DO;  Location:  [facility] CATH INVASIVE LOCATION;  Service: Cardiology;  Laterality: Left;    CARDIAC SURGERY      CATARACT EXTRACTION      CERVICAL SPINE SURGERY      COLONOSCOPY N/A 01/31/2017    Normal exam repeat in 5 years    COLONOSCOPY N/A 02/11/2019    Mild acute inflammation    COLONOSCOPY W/ POLYPECTOMY  03/04/2014    Hyperplastic polyp    CORONARY ARTERY BYPASS GRAFT  10/2015    ENDOSCOPY  04/13/2011    Gastritis with hemorrhage    ENDOSCOPY N/A 05/05/2017    Normal exam    ENDOSCOPY N/A 02/11/2019    Gastritis    ENDOSCOPY N/A 09/01/2020    Non-erosive gastritis with hemorrhage    ENDOSCOPY N/A 02/10/2021    Esophagitis    FOOT SURGERY Left     INCISION AND DRAINAGE OF WOUND Left 09/2007    spider bite       Family History  Family History   Problem Relation Age of Onset    Colon cancer Father     Heart disease Father     Colon cancer Sister     Colon polyps Sister      Alzheimer's disease Mother     Coronary artery disease Sister     Coronary artery disease Sister        Social History  Social History     Socioeconomic History    Marital status:    Tobacco Use    Smoking status: Former     Types: Cigarettes     Quit date:      Years since quittin.7    Smokeless tobacco: Never    Tobacco comments:     smoked in highschool   Vaping Use    Vaping Use: Never used   Substance and Sexual Activity    Alcohol use: No    Drug use: No    Sexual activity: Defer       Review of Systems:  History obtained from chart review and the patient  General ROS: No fever or chills  Respiratory ROS: No cough, shortness of breath, wheezing  Cardiovascular ROS: No chest pain or palpitations  Gastrointestinal ROS: No abdominal pain or melena  Genito-Urinary ROS: No dysuria or hematuria  Psych ROS: No anxiety and depression  14 point ROS reviewed with the patient and negative except as noted above and in the HPI unless unable to obtain.    Objective:  Blood pressure: 114/66 mmHg  Heart rate is 78 bpm.  O2 saturation 100%.    General: awake/alert   HEENT: Normocephalic atraumatic head  Neck: Supple with no JVD carotid bruits.  Chest:  clear to auscultation bilaterally without respiratory distress  CVS: regular rate and rhythm  Abdominal: soft, nontender, positive bowel sounds  Extremities:  Left foot diabetic ulcer at plantar aspect  Skin: warm and dry without rash      Labs:  Results from last 7 days   Lab Units 23  0913 23  0517 23  0629   WBC 10*3/mm3 7.45 8.69 10.86*   HEMOGLOBIN g/dL 8.9* 8.7* 8.9*   HEMATOCRIT % 29.4* 28.8* 30.2*   PLATELETS 10*3/mm3 328 310 381         Results from last 7 days   Lab Units 23  0913 23  0517 23  0629   SODIUM mmol/L 137 140 140   POTASSIUM mmol/L 4.8 4.2 4.3   CHLORIDE mmol/L 101 103 105   CO2 mmol/L 24.0 25.0 23.0   BUN mg/dL 24* 21 23   CREATININE mg/dL 2.12* 1.95* 1.87*   CALCIUM mg/dL 9.1 8.9 9.2   EGFR mL/min/1.73  33.5* 37.0* 38.9*   BILIRUBIN mg/dL  --   --  0.2   ALK PHOS U/L  --   --  106   ALT (SGPT) U/L  --   --  9   AST (SGOT) U/L  --   --  12   GLUCOSE mg/dL 273* 118* 112*       Radiology:   Imaging Results (Last 72 Hours)       ** No results found for the last 72 hours. **            Culture:  No results found for: BLOODCX, URINECX, WOUNDCX, MRSACX, RESPCX, STOOLCX      Assessment   1.  Stage IIIb chronic kidney disease baseline.  2.  Type II diabetic nephropathy.  3.  Status post debridement of diabetic foot ulcer  4.  Chronic diastolic CHF.  5.  Proteus wound infection  6.  Insulin-dependent type 2 diabetes.  7.  History of gastroparesis.  8.  Secondary hyperparathyroidism.  9.  Anemia of CKD.    Plan:  1.  Low-dose loop diuretics.  2.  Continue calcitriol.  3.  Intravenous Ferrlecit   4.  Midodrine as needed for orthostatic hypotension      Brian Lomas MD  9/21/2023  14:15 CDT

## 2023-09-21 NOTE — PROGRESS NOTES
[facility]  NIMESH Parkinson APRN        Internal Medicine Progress Note    9/21/2023   13:02 CDT    Name:  Erick Luong  MRN:    [id number]     Acct:     [id number]   Room:  [address] [place] [address] Day: 0     Admit Date: 9/18/2023  6:30 PM  PCP: Del Shetty MD    Subjective:     C/C: weakness, fatigue    Interval History: Status:  improved. Up to chair. Wife at bedside. Afebrile. Maintaining adequate 02 sats on room air. C/o increased pain to left foot today. No further complaints of chest pain. Slight decline in renal function. Counts otherwise stable.  Tolerating treatment thus far.     Review of Systems   Constitutional: Positive for malaise/fatigue. Negative for chills, decreased appetite, weight gain and weight loss.   HENT:  Negative for congestion, ear discharge, hoarse voice and tinnitus.    Eyes:  Negative for blurred vision, discharge, visual disturbance and visual halos.   Cardiovascular:  Negative for chest pain, claudication, dyspnea on exertion, irregular heartbeat, leg swelling, orthopnea and paroxysmal nocturnal dyspnea.   Respiratory:  Negative for cough, shortness of breath, sputum production and wheezing.    Endocrine: Negative for cold intolerance, heat intolerance and polyuria.   Hematologic/Lymphatic: Negative for adenopathy. Does not bruise/bleed easily.   Skin:  Positive for poor wound healing. Negative for dry skin, itching and suspicious lesions.   Musculoskeletal:  Positive for neck pain. Negative for arthritis, back pain, falls, joint pain, muscle weakness and myalgias.   Gastrointestinal:  Negative for abdominal pain, constipation, diarrhea, dysphagia and hematemesis.   Genitourinary:  Negative for bladder incontinence, dysuria and frequency.   Neurological:  Positive for weakness. Negative for aphonia, disturbances in coordination and dizziness.   Psychiatric/Behavioral:  Negative for altered mental status, depression, memory loss and substance abuse. The patient  "does not have insomnia and is not nervous/anxious.        Medications:     Allergies:   Allergies   Allergen Reactions    Cefepime Hives and Anaphylaxis    Bactrim [Sulfamethoxazole-Trimethoprim] Other (See Comments)     \"RENAL FAILURE\"    Vancomycin Itching    Zolpidem Unknown - High Severity     \"makes him crazy\"    Zolpidem Tartrate Unknown - Low Severity and Provider Review Needed    Metronidazole Rash       Current Meds:   Current Facility-Administered Medications:     acetaminophen (TYLENOL) tablet 650 mg, 650 mg, Oral, Q4H PRN **OR** acetaminophen (TYLENOL) suppository 650 mg, 650 mg, Rectal, Q4H PRN, Juan Manuel Page MD    aluminum-magnesium hydroxide-simethicone (MAALOX MAX) 400-400-40 MG/5ML suspension 30 mL, 30 mL, Oral, Q4H PRN, Juan Manuel Page MD    apixaban (ELIQUIS) tablet 5 mg, 5 mg, Oral, BID With Meals, Juan Manuel Page MD    bumetanide (BUMEX) tablet 1 mg, 1 mg, Oral, Daily, Juan Manuel Page MD    busPIRone (BUSPAR) tablet 10 mg, 10 mg, Oral, Q12H PRN, Juan Manuel Page MD    calcitriol (ROCALTROL) capsule 0.5 mcg, 0.5 mcg, Oral, Daily, Juan Manuel Page MD    carvedilol (COREG) tablet 3.125 mg, 3.125 mg, Oral, BID With Meals, Juan Manuel Page MD    cefTRIAXone (ROCEPHIN) 1,000 mg in sodium chloride 0.9 % 100 mL IVPB, 1,000 mg, Intravenous, Q24H, Juan Manuel Page MD    cholecalciferol (VITAMIN D3) tablet 5,000 Units, 5,000 Units, Oral, Daily, Juan Manuel Page MD    dextrose (D50W) (25 g/50 mL) IV injection 25 g, 25 g, Intravenous, Q15 Min PRN, Juan Manuel Page MD    dextrose (GLUTOSE) oral gel 15 g, 15 g, Oral, Q15 Min PRN, Juan Manuel Page MD    docusate sodium (COLACE) capsule 200 mg, 200 mg, Oral, BID, Juan Manuel Page MD    donepezil (ARICEPT) tablet 10 mg, 10 mg, Oral, Daily, Juan Manuel Page MD    [START ON 9/25/2023] Dulaglutide solution pen-injector 4.5 mg, 4.5 mg, Subcutaneous, Weekly, Juan Manuel Page MD   "  empagliflozin (JARDIANCE) tablet 25 mg, 25 mg, Oral, Daily, Juna Manuel Page MD    glucagon (GLUCAGEN) injection 1 mg, 1 mg, Intramuscular, Q15 Min PRN, Juan Manuel Page MD    HYDROmorphone (DILAUDID) injection 1 mg, 1 mg, Intravenous, Q6H PRN, Juan Manuel Page MD    insulin detemir (LEVEMIR) injection 20 Units, 20 Units, Subcutaneous, Daily, Juan Manuel Page MD    Insulin Lispro (humaLOG) injection 2-9 Units, 2-9 Units, Subcutaneous, 4x Daily AC & at Bedtime, Juan Manuel Page MD    Lidocaine HCl 4 % gel gel 1 application , 1 application , Topical, PRN, Juan Manuel Page MD    magnesium hydroxide (MILK OF MAGNESIA) 400 MG/5ML suspension 15 mL, 15 mL, Oral, Daily PRN, Juan Manuel Page MD    metoclopramide (REGLAN) tablet 10 mg, 10 mg, Oral, TID AC, Juan Manuel Page MD    ondansetron (ZOFRAN) tablet 4 mg, 4 mg, Oral, Q6H PRN **OR** ondansetron (ZOFRAN) injection 4 mg, 4 mg, Intravenous, Q6H PRN, Juan Manuel Page MD    oxyCODONE (oxyCONTIN) 12 hr tablet 10 mg, 10 mg, Oral, Q12H, Juan Manuel Page MD    oxyCODONE (ROXICODONE) immediate release tablet 5 mg, 5 mg, Oral, Q4H PRN, Juan Manuel Page MD    pantoprazole (PROTONIX) EC tablet 40 mg, 40 mg, Oral, BID, Juan Manuel Page MD    polyethylene glycol (MIRALAX) packet 17 g, 17 g, Oral, Daily, Juan Manuel Page MD    pregabalin (LYRICA) capsule 100 mg, 100 mg, Oral, Daily, Juan Manuel Page MD    pregabalin (LYRICA) capsule 200 mg, 200 mg, Oral, Nightly, Juan Manuel Page MD    rosuvastatin (CRESTOR) tablet 40 mg, 40 mg, Oral, Nightly, Juan Manuel Page MD    saccharomyces boulardii (FLORASTOR) capsule 250 mg, 250 mg, Oral, BID, Juan Manuel Page MD    sennosides-docusate (PERICOLACE) 8.6-50 MG per tablet 1 tablet, 1 tablet, Oral, Nightly, Juan Manuel Page MD    sodium chloride 0.9 % flush 10 mL, 10 mL, Intravenous, Q12H, Juan Manuel Page MD    sodium chloride 0.9 %  flush 10 mL, 10 mL, Intravenous, PRN, Juan Manuel Page MD    sodium hypochlorite (DAKIN'S 1/4 STRENGTH) 0.125 % topical solution 0.125% solution, , Topical, Q8H, Juan Manuel Page MD    sucralfate (CARAFATE) tablet 1 g, 1 g, Oral, TID AC, Juan Manuel Page MD    tamsulosin (FLOMAX) 24 hr capsule 0.4 mg, 0.4 mg, Oral, Daily, Juan Manuel Page MD    valsartan (DIOVAN) tablet 40 mg, 40 mg, Oral, Nightly, Juan Manuel Page MD    Data:     Code Status:    There are no questions and answers to display.       Family History   Problem Relation Age of Onset    Colon cancer Father     Heart disease Father     Colon cancer Sister     Colon polyps Sister     Alzheimer's disease Mother     Coronary artery disease Sister     Coronary artery disease Sister        Social History     Socioeconomic History    Marital status:    Tobacco Use    Smoking status: Former     Types: Cigarettes     Quit date:      Years since quittin.7    Smokeless tobacco: Never    Tobacco comments:     smoked in [employer]   Vaping Use    Vaping Use: Never used   Substance and Sexual Activity    Alcohol use: No    Drug use: No    Sexual activity: Defer       Vitals:  T 97.8 P 82 R 20 Bp 114/66 Sp02 99% (room air)            I/O (24Hr):  No intake or output data in the 24 hours ending 23 1302    Labs and imaging:      Recent Results (from the past 12 hour(s))   POC Glucose Once    Collection Time: 23  7:10 AM    Specimen: Blood   Result Value Ref Range    Glucose 215 (H) 70 - 130 mg/dL   Basic Metabolic Panel    Collection Time: 23  9:13 AM    Specimen: Blood   Result Value Ref Range    Glucose 273 (H) 65 - 99 mg/dL    BUN 24 (H) 8 - 23 mg/dL    Creatinine 2.12 (H) 0.76 - 1.27 mg/dL    Sodium 137 136 - 145 mmol/L    Potassium 4.8 3.5 - 5.2 mmol/L    Chloride 101 98 - 107 mmol/L    CO2 24.0 22.0 - 29.0 mmol/L    Calcium 9.1 8.6 - 10.5 mg/dL    BUN/Creatinine Ratio 11.3 7.0 - 25.0    Anion Gap 12.0  5.0 - 15.0 mmol/L    eGFR 33.5 (L) >60.0 mL/min/1.73   Sedimentation Rate    Collection Time: 09/21/23  9:13 AM    Specimen: Blood   Result Value Ref Range    Sed Rate 59 (H) 0 - 20 mm/hr   C-reactive Protein    Collection Time: 09/21/23  9:13 AM    Specimen: Blood   Result Value Ref Range    C-Reactive Protein 2.35 (H) 0.00 - 0.50 mg/dL   Iron Profile    Collection Time: 09/21/23  9:13 AM    Specimen: Blood   Result Value Ref Range    Iron 30 (L) 59 - 158 mcg/dL    Iron Saturation (TSAT) 11 (L) 20 - 50 %    Transferrin 178 (L) 200 - 360 mg/dL    TIBC 265 (L) 298 - 536 mcg/dL   CBC Auto Differential    Collection Time: 09/21/23  9:13 AM    Specimen: Blood   Result Value Ref Range    WBC 7.45 3.40 - 10.80 10*3/mm3    RBC 3.31 (L) 4.14 - 5.80 10*6/mm3    Hemoglobin 8.9 (L) 13.0 - 17.7 g/dL    Hematocrit 29.4 (L) 37.5 - 51.0 %    MCV 88.8 79.0 - 97.0 fL    MCH 26.9 26.6 - 33.0 pg    MCHC 30.3 (L) 31.5 - 35.7 g/dL    RDW 15.7 (H) 12.3 - 15.4 %    RDW-SD 50.9 37.0 - 54.0 fl    MPV 11.7 6.0 - 12.0 fL    Platelets 328 140 - 450 10*3/mm3    Neutrophil % 62.9 42.7 - 76.0 %    Lymphocyte % 23.6 19.6 - 45.3 %    Monocyte % 7.0 5.0 - 12.0 %    Eosinophil % 4.8 0.3 - 6.2 %    Basophil % 1.3 0.0 - 1.5 %    Immature Grans % 0.4 0.0 - 0.5 %    Neutrophils, Absolute 4.68 1.70 - 7.00 10*3/mm3    Lymphocytes, Absolute 1.76 0.70 - 3.10 10*3/mm3    Monocytes, Absolute 0.52 0.10 - 0.90 10*3/mm3    Eosinophils, Absolute 0.36 0.00 - 0.40 10*3/mm3    Basophils, Absolute 0.10 0.00 - 0.20 10*3/mm3    Immature Grans, Absolute 0.03 0.00 - 0.05 10*3/mm3    nRBC 0.0 0.0 - 0.2 /100 WBC   POC Glucose Once    Collection Time: 09/21/23 11:04 AM    Specimen: Blood   Result Value Ref Range    Glucose 233 (H) 70 - 130 mg/dL                               Physical Examination:        Physical Exam  Vitals and nursing note reviewed.   Constitutional:       Appearance: Normal appearance. He is well-developed. He is obese.   HENT:      Head: Normocephalic  and atraumatic.      Right Ear: External ear normal.      Left Ear: External ear normal.      Nose: Nose normal.      Mouth/Throat:      Mouth: Mucous membranes are moist.      Pharynx: Oropharynx is clear.   Eyes:      Pupils: Pupils are equal, round, and reactive to light.   Cardiovascular:      Rate and Rhythm: Normal rate. Rhythm irregular.      Heart sounds: Normal heart sounds.   Pulmonary:      Effort: Pulmonary effort is normal.      Breath sounds: Normal breath sounds.   Abdominal:      General: Bowel sounds are normal.      Palpations: Abdomen is soft.      Comments: obese   Musculoskeletal:         General: Normal range of motion.      Cervical back: Normal range of motion and neck supple.      Comments: Generalized weakness   Skin:     General: Skin is warm and dry.      Comments: Dressing c.d.i   Neurological:      Mental Status: He is alert and oriented to person, place, and time.      Deep Tendon Reflexes: Reflexes are normal and symmetric.   Psychiatric:         Behavior: Behavior normal.         Assessment:            * No active hospital problems. *    Past Medical History:   Diagnosis Date    Arthritis     Autonomic disease     CAD (coronary artery disease) 02/06/2017    Cervical radiculopathy 09/16/2021    Chronic constipation with acute exaccerbation 05/10/2021    Coronary artery disease     Degeneration of cervical intervertebral disc 08/11/2021    Diabetes mellitus     Diabetic foot ulcer 08/31/2020    Diabetic polyneuropathy associated with type 2 diabetes mellitus 01/18/2021    Elevated cholesterol     Gastroesophageal reflux disease 05/13/2019    Headache     HTN (hypertension), benign 05/03/2017    Hyperlipidemia     Hypertension     Mixed hyperlipidemia 02/07/2017    Multiple lung nodules 01/26/2020    5mm, 9 mm RLL identified 1/2020, not present 10/2019.    Myocardial infarction     Osteomyelitis 01/22/2020    Osteomyelitis of fifth toe of right foot 10/07/2019    Pancreatitis      Persistent insomnia 01/20/2020    Renal disorder     Sleep apnea 02/06/2017    Sleep apnea with use of continuous positive airway pressure (CPAP)     NON-COMPLIANT    Slow transit constipation 01/16/2019    Spinal stenosis in cervical region 09/16/2021    Vitamin D deficiency 03/02/2021        Plan:        Proteus wound infection s/p debridement  New onset atrial fibrillation  Acute on chronic diastolic CHF  CKD3  Multifactorial anemia  DM2 with hyperglycemia on long term insulin  BRITTNI  Chronic pain syndrome  Chronic neck pain with radiculopathy  Diabetic peripheral neuropathy  Hx CAD  GERD  Possible gastroparesis  Continue current treatment. Monitor counts. Increase activity. Labs Monday. Wound care per wound care team. Continue IV antibiotics as tolerated. Appreciate nephrology evaluation and treatment. Continue glycemic control efforts.       Electronically signed by SUSAN Muñoz on 9/21/2023 at 13:02 CDT

## 2023-09-22 LAB
ANION GAP SERPL CALCULATED.3IONS-SCNC: 14 MMOL/L (ref 5–15)
BASOPHILS # BLD AUTO: 0.08 10*3/MM3 (ref 0–0.2)
BASOPHILS NFR BLD AUTO: 0.8 % (ref 0–1.5)
BUN SERPL-MCNC: 32 MG/DL (ref 8–23)
BUN/CREAT SERPL: 13 (ref 7–25)
CALCIUM SPEC-SCNC: 9.2 MG/DL (ref 8.6–10.5)
CHLORIDE SERPL-SCNC: 101 MMOL/L (ref 98–107)
CO2 SERPL-SCNC: 24 MMOL/L (ref 22–29)
CREAT SERPL-MCNC: 2.46 MG/DL (ref 0.76–1.27)
DEPRECATED RDW RBC AUTO: 51 FL (ref 37–54)
EGFRCR SERPLBLD CKD-EPI 2021: 28 ML/MIN/1.73
EOSINOPHIL # BLD AUTO: 0.25 10*3/MM3 (ref 0–0.4)
EOSINOPHIL NFR BLD AUTO: 2.4 % (ref 0.3–6.2)
ERYTHROCYTE [DISTWIDTH] IN BLOOD BY AUTOMATED COUNT: 15.7 % (ref 12.3–15.4)
GLUCOSE BLDC GLUCOMTR-MCNC: 172 MG/DL (ref 70–130)
GLUCOSE BLDC GLUCOMTR-MCNC: 188 MG/DL (ref 70–130)
GLUCOSE BLDC GLUCOMTR-MCNC: 193 MG/DL (ref 70–130)
GLUCOSE BLDC GLUCOMTR-MCNC: 297 MG/DL (ref 70–130)
GLUCOSE SERPL-MCNC: 150 MG/DL (ref 65–99)
HCT VFR BLD AUTO: 28.6 % (ref 37.5–51)
HGB BLD-MCNC: 8.7 G/DL (ref 13–17.7)
IMM GRANULOCYTES # BLD AUTO: 0.04 10*3/MM3 (ref 0–0.05)
IMM GRANULOCYTES NFR BLD AUTO: 0.4 % (ref 0–0.5)
LYMPHOCYTES # BLD AUTO: 1.81 10*3/MM3 (ref 0.7–3.1)
LYMPHOCYTES NFR BLD AUTO: 17.4 % (ref 19.6–45.3)
MCH RBC QN AUTO: 26.9 PG (ref 26.6–33)
MCHC RBC AUTO-ENTMCNC: 30.4 G/DL (ref 31.5–35.7)
MCV RBC AUTO: 88.5 FL (ref 79–97)
MONOCYTES # BLD AUTO: 0.75 10*3/MM3 (ref 0.1–0.9)
MONOCYTES NFR BLD AUTO: 7.2 % (ref 5–12)
NEUTROPHILS NFR BLD AUTO: 7.48 10*3/MM3 (ref 1.7–7)
NEUTROPHILS NFR BLD AUTO: 71.8 % (ref 42.7–76)
NRBC BLD AUTO-RTO: 0 /100 WBC (ref 0–0.2)
PLATELET # BLD AUTO: 330 10*3/MM3 (ref 140–450)
PMV BLD AUTO: 11.9 FL (ref 6–12)
POTASSIUM SERPL-SCNC: 4.3 MMOL/L (ref 3.5–5.2)
RBC # BLD AUTO: 3.23 10*6/MM3 (ref 4.14–5.8)
SODIUM SERPL-SCNC: 139 MMOL/L (ref 136–145)
WBC NRBC COR # BLD: 10.41 10*3/MM3 (ref 3.4–10.8)

## 2023-09-22 PROCEDURE — 85025 COMPLETE CBC W/AUTO DIFF WBC: CPT | Performed by: INTERNAL MEDICINE

## 2023-09-22 PROCEDURE — 82948 REAGENT STRIP/BLOOD GLUCOSE: CPT

## 2023-09-22 PROCEDURE — 25010000002 NA FERRIC GLUC CPLX PER 12.5 MG: Performed by: INTERNAL MEDICINE

## 2023-09-22 PROCEDURE — 80048 BASIC METABOLIC PNL TOTAL CA: CPT | Performed by: INTERNAL MEDICINE

## 2023-09-22 PROCEDURE — 63710000001 INSULIN DETEMIR PER 5 UNITS: Performed by: INTERNAL MEDICINE

## 2023-09-22 PROCEDURE — 25010000002 HYDROMORPHONE 1 MG/ML SOLUTION: Performed by: INTERNAL MEDICINE

## 2023-09-22 PROCEDURE — 97530 THERAPEUTIC ACTIVITIES: CPT

## 2023-09-22 PROCEDURE — 25010000002 CEFTRIAXONE PER 250 MG: Performed by: INTERNAL MEDICINE

## 2023-09-22 PROCEDURE — 25010000002 ONDANSETRON PER 1 MG: Performed by: INTERNAL MEDICINE

## 2023-09-22 PROCEDURE — 63710000001 INSULIN LISPRO (HUMAN) PER 5 UNITS: Performed by: INTERNAL MEDICINE

## 2023-09-22 RX ORDER — METOCLOPRAMIDE 5 MG/1
5 TABLET ORAL
Status: DISCONTINUED | OUTPATIENT
Start: 2023-09-22 | End: 2023-10-11 | Stop reason: HOSPADM

## 2023-09-22 RX ORDER — LANOLIN ALCOHOL/MO/W.PET/CERES
6 CREAM (GRAM) TOPICAL NIGHTLY
Status: DISCONTINUED | OUTPATIENT
Start: 2023-09-22 | End: 2023-10-11 | Stop reason: HOSPADM

## 2023-09-22 NOTE — PROGRESS NOTES
Nephrology ([facility]) Progress Note      Patient:  Erick Luong  YOB: 1955  Date of Service: 9/22/2023  MRN: [id number]   Acct: [id number]   Primary Care Physician: Del Shetty MD  Advance Directive:   There are no questions and answers to display.     Admit Date: 9/18/2023       Hospital Day: 0  Referring Provider: Juan Manuel Page MD      Patient personally seen and examined.  Complete chart including Consults, Notes, Operative Reports, Labs, Cardiology, and Radiology studies reviewed as able.    Chief complaint: Abnormal labs.    Subjective:  Erick Luong is a 68 y.o. male  whom we were consulted for chronic kidney disease stage IIIb.  He has stage IIIb chronic kidney disease baseline, follows me in the office as he has diabetic nephropathy.  He has history of insulin-dependent type 2 diabetes, hypertension, abdominal obesity, obstructive sleep apnea, diabetic foot ulcer and coronary artery disease.  He was accepted as a transfer from [facility].  [place] [facility] [place] and extensive debridement of left foot wound at plantar aspect.  Postoperative wound measuring 6 x 17 x 4 cm with exposed bone.  Surgical culture returned positive for Proteus Mirabills.  Patient needed long-term IV antibiotics.  He is now admitted for wound care, long-term IV antibiotics, chronic kidney disease and treatment of diastolic congestive heart failure.  Patient also has history of gastroparesis.       This morning patient is weak, denies any shortness of breath or swelling of legs.  His renal function continued to worsen      Allergies:  Cefepime, Bactrim [sulfamethoxazole-trimethoprim], Vancomycin, Zolpidem, Zolpidem tartrate, and Metronidazole    Home Meds:  Medications Prior to Admission   Medication Sig Dispense Refill Last Dose    amitriptyline (ELAVIL) 25 MG tablet Take 2 tablets by mouth Daily at bedtime. 60 tablet 1      amoxicillin-clavulanate (Augmentin) 500-125 MG per tablet Take 1 tablet by mouth every 12 hours with meals for 7 days. 14 tablet 0     ascorbic acid (VITAMIN C) 1000 MG tablet Take 1 tablet by mouth Daily. 30 tablet 3     Aspirin 81 MG capsule Take 81 mg by mouth Daily.       bumetanide (BUMEX) 2 MG tablet Take 1 tablet by mouth Daily. Take 2 tablets in morning;1 tablet at night       busPIRone (BUSPAR) 10 MG tablet Take 1 tablet by mouth 2 (Two) Times a Day As Needed. 100 tablet 3     calcitriol (ROCALTROL) 0.5 MCG capsule Take 1 capsule by mouth Daily. 90 capsule 4     carvedilol (COREG) 6.25 MG tablet Take 1 tablet by mouth 2 (Two) Times a Day. 180 tablet 4     Cholecalciferol (D-5000) 125 MCG (5000 UT) tablet Take 1 tablet by mouth Daily Before Lunch. 30 tablet 2     cloNIDine (CATAPRES) 0.1 MG tablet Take 1 tablet by mouth Every 12 (Twelve) Hours. 60 tablet 2     Diclofenac Sodium (VOLTAREN) 1 % gel gel Apply 2 g topically to the appropriate area as directed 4 (Four) Times a Day As Needed. 300 g 11     donepezil (ARICEPT) 10 MG tablet Take 1 tablet by mouth Daily. 90 tablet 4     Dulaglutide (Trulicity) 4.5 MG/0.5ML solution pen-injector Inject 0.5 mL under the skin into the appropriate area as directed 1 (One) Time Per Week. 2 mL 11     empagliflozin (JARDIANCE) 25 MG tablet tablet Take 1 tablet by mouth Daily. 30 tablet 2     HYDROcodone-acetaminophen (NORCO) 7.5-325 MG per tablet Take 1 tablet by mouth 2 (Two) Times a Day As Needed. 40 tablet 0     Insulin Regular Human, Conc, (HumuLIN R U-500 KwikPen) 500 UNIT/ML solution pen-injector CONCENTRATED injection Inject 120 Units under the skin into the appropriate area as directed 2 (Two) Times a Day with breakfast and dinner. (Patient taking differently: Inject 120 Units under the skin into the appropriate area as directed 3 (Three) Times a Day Before Meals.) 18 mL 5     midodrine (PROAMATINE) 10 MG tablet Take 1 tablet by mouth 3 (Three) Times a Day. 270  tablet 4     ondansetron ODT (ZOFRAN-ODT) 8 MG disintegrating tablet Place 1 tablet under tongue 3 times a day as needed. 25 tablet 1     pantoprazole (Protonix) 40 MG EC tablet Take 1 tablet by mouth 2 (Two) Times a Day. 180 tablet 4     polyethylene glycol (MIRALAX) 17 g packet Take 17 g by mouth Daily. Obtain OTC       pregabalin (LYRICA) 100 MG capsule Take 1 capsule by mouth Daily With Breakfast AND 2 capsules Every Night. 90 capsule 2     rosuvastatin (CRESTOR) 40 MG tablet Take 1 tablet by mouth Every Night. 90 tablet 3     sennosides-docusate (PERICOLACE) 8.6-50 MG per tablet Take 1 tablet by mouth Every Night. Obtain OTC       sennosides-docusate (PERICOLACE) 8.6-50 MG per tablet Take 1 tablet by mouth every night at bedtime. 30 tablet 2     sodium hypochlorite (DAKIN'S 1/4 STRENGTH) 0.125 % solution topical solution 0.125% Apply to affected area twice daily 473 mL 3     tamsulosin (FLOMAX) 0.4 MG capsule 24 hr capsule Take 1 capsule by mouth Daily. 90 capsule 1        Medicines:  Current Facility-Administered Medications   Medication Dose Route Frequency Provider Last Rate Last Admin    acetaminophen (TYLENOL) tablet 650 mg  650 mg Oral Q4H PRN Juan Manuel Page MD        Or    acetaminophen (TYLENOL) suppository 650 mg  650 mg Rectal Q4H PRN Juan Manuel Page MD        aluminum-magnesium hydroxide-simethicone (MAALOX MAX) 400-400-40 MG/5ML suspension 30 mL  30 mL Oral Q4H PRN Juan Manuel Page MD        apixaban (ELIQUIS) tablet 5 mg  5 mg Oral BID With Meals Juan Manuel Page MD        bumetanide (BUMEX) tablet 1 mg  1 mg Oral Daily Juan Manuel Page MD        busPIRone (BUSPAR) tablet 10 mg  10 mg Oral Q12H PRN Juan Manuel Page MD        calcitriol (ROCALTROL) capsule 0.5 mcg  0.5 mcg Oral Daily Juan Manuel Page MD        carvedilol (COREG) tablet 3.125 mg  3.125 mg Oral BID With Meals Juan Manuel Page MD        cefTRIAXone (ROCEPHIN) 1,000 mg in sodium  chloride 0.9 % 100 mL IVPB  1,000 mg Intravenous Q24H Juan Manuel Page MD        cholecalciferol (VITAMIN D3) tablet 5,000 Units  5,000 Units Oral Daily Juan Manuel Page MD        dextrose (D50W) (25 g/50 mL) IV injection 25 g  25 g Intravenous Q15 Min PRN Juan Manuel Page MD        dextrose (GLUTOSE) oral gel 15 g  15 g Oral Q15 Min PRN Juan Manuel Page MD        docusate sodium (COLACE) capsule 200 mg  200 mg Oral BID Juan Manuel Page MD        donepezil (ARICEPT) tablet 10 mg  10 mg Oral Daily Juan Manuel Page MD        [START ON 9/25/2023] Dulaglutide solution pen-injector 4.5 mg  4.5 mg Subcutaneous Weekly Juan Manuel Page MD        empagliflozin (JARDIANCE) tablet 25 mg  25 mg Oral Daily Juan Manuel Page MD        ferric gluconate (FERRLECIT) 125 mg in sodium chloride 0.9 % 100 mL IVPB  125 mg Intravenous Q24H Brian Lomas MD        glucagon (GLUCAGEN) injection 1 mg  1 mg Intramuscular Q15 Min PRN Juan Manuel Page MD        HYDROmorphone (DILAUDID) injection 1 mg  1 mg Intravenous Q6H PRN Juan Manuel Page MD        insulin detemir (LEVEMIR) injection 20 Units  20 Units Subcutaneous Daily Juan Manuel Page MD        Insulin Lispro (humaLOG) injection 2-9 Units  2-9 Units Subcutaneous 4x Daily AC & at Bedtime Juan Manuel Page MD        Lidocaine HCl 4 % gel gel 1 application   1 application  Topical PRN Juan Manuel Page MD        magnesium hydroxide (MILK OF MAGNESIA) 400 MG/5ML suspension 15 mL  15 mL Oral Daily PRN Juan Manuel Page MD        melatonin tablet 6 mg  6 mg Oral Nightly Shruthi Isabel, SUSAN        metoclopramide (REGLAN) tablet 10 mg  10 mg Oral TID AC Juan Manuel Page MD        ondansetron (ZOFRAN) tablet 4 mg  4 mg Oral Q6H PRN Juan Manuel Page MD        Or    ondansetron (ZOFRAN) injection 4 mg  4 mg Intravenous Q6H PRN Juan Manuel Page MD        oxyCODONE (oxyCONTIN) 12 hr tablet 10 mg   10 mg Oral Q12H Juan Manuel Page MD        oxyCODONE (ROXICODONE) immediate release tablet 5 mg  5 mg Oral Q4H PRN Juan Manuel Page MD        pantoprazole (PROTONIX) EC tablet 40 mg  40 mg Oral BID Juan Manuel Page MD        polyethylene glycol (MIRALAX) packet 17 g  17 g Oral Daily Juan Manuel Page MD        pregabalin (LYRICA) capsule 100 mg  100 mg Oral Daily Juan Manuel Page MD        pregabalin (LYRICA) capsule 200 mg  200 mg Oral Nightly Juan Mnauel Page MD        rosuvastatin (CRESTOR) tablet 40 mg  40 mg Oral Nightly Juan Manuel Page MD        saccharomyces boulardii (FLORASTOR) capsule 250 mg  250 mg Oral BID Juan Manuel Page MD        sennosides-docusate (PERICOLACE) 8.6-50 MG per tablet 1 tablet  1 tablet Oral Nightly Juan Manuel Page MD        sodium chloride 0.9 % flush 10 mL  10 mL Intravenous Q12H Juan Manuel Page MD        sodium chloride 0.9 % flush 10 mL  10 mL Intravenous PRN Juan Manuel Page MD        sucralfate (CARAFATE) tablet 1 g  1 g Oral TID AC Juan Manuel Page MD        tamsulosin (FLOMAX) 24 hr capsule 0.4 mg  0.4 mg Oral Daily Juan Manuel Page MD        valsartan (DIOVAN) tablet 40 mg  40 mg Oral Nightly Juan Manuel Page MD           Past Medical History:  Past Medical History:   Diagnosis Date    Arthritis     Autonomic disease     CAD (coronary artery disease) 02/06/2017    Cervical radiculopathy 09/16/2021    Chronic constipation with acute exaccerbation 05/10/2021    Coronary artery disease     Degeneration of cervical intervertebral disc 08/11/2021    Diabetes mellitus     Diabetic foot ulcer 08/31/2020    Diabetic polyneuropathy associated with type 2 diabetes mellitus 01/18/2021    Elevated cholesterol     Gastroesophageal reflux disease 05/13/2019    Headache     HTN (hypertension), benign 05/03/2017    Hyperlipidemia     Hypertension     Mixed hyperlipidemia 02/07/2017    Multiple lung nodules  01/26/2020    5mm, 9 mm RLL identified 1/2020, not present 10/2019.    Myocardial infarction     Osteomyelitis 01/22/2020    Osteomyelitis of fifth toe of right foot 10/07/2019    Pancreatitis     Persistent insomnia 01/20/2020    Renal disorder     Sleep apnea 02/06/2017    Sleep apnea with use of continuous positive airway pressure (CPAP)     NON-COMPLIANT    Slow transit constipation 01/16/2019    Spinal stenosis in cervical region 09/16/2021    Vitamin D deficiency 03/02/2021       Past Surgical History:  Past Surgical History:   Procedure Laterality Date    ABDOMINAL SURGERY      AMPUTATION FOOT / TOE Left 10/2021    5th digit     ANTERIOR CERVICAL DISCECTOMY W/ FUSION N/A 8/5/2022    Procedure: CERVICAL DISCECTOMY ANTERIOR WITH FUSION C5-6 with possible plating of C5-7 with neuromonitoring and 1 c-arm;  Surgeon: Karel Soliz MD;  Location:  [facility] OR;  Service: Neurosurgery;  Laterality: N/A;    APPENDECTOMY      BACK SURGERY      CARDIAC CATHETERIZATION Left 02/08/2021    Procedure: Left Heart Cath w poss intervention left anatomical snuff box acess;  Surgeon: Omkar Charles DO;  Location:  [facility] CATH INVASIVE LOCATION;  Service: Cardiology;  Laterality: Left;    CARDIAC SURGERY      CATARACT EXTRACTION      CERVICAL SPINE SURGERY      COLONOSCOPY N/A 01/31/2017    Normal exam repeat in 5 years    COLONOSCOPY N/A 02/11/2019    Mild acute inflammation    COLONOSCOPY W/ POLYPECTOMY  03/04/2014    Hyperplastic polyp    CORONARY ARTERY BYPASS GRAFT  10/2015    ENDOSCOPY  04/13/2011    Gastritis with hemorrhage    ENDOSCOPY N/A 05/05/2017    Normal exam    ENDOSCOPY N/A 02/11/2019    Gastritis    ENDOSCOPY N/A 09/01/2020    Non-erosive gastritis with hemorrhage    ENDOSCOPY N/A 02/10/2021    Esophagitis    FOOT SURGERY Left     INCISION AND DRAINAGE OF WOUND Left 09/2007    spider bite       Family History  Family History   Problem Relation Age of Onset    Colon cancer Father     Heart disease  Father     Colon cancer Sister     Colon polyps Sister     Alzheimer's disease Mother     Coronary artery disease Sister     Coronary artery disease Sister        Social History  Social History     Socioeconomic History    Marital status:    Tobacco Use    Smoking status: Former     Types: Cigarettes     Quit date:      Years since quittin.7    Smokeless tobacco: Never    Tobacco comments:     smoked in highschool   Vaping Use    Vaping Use: Never used   Substance and Sexual Activity    Alcohol use: No    Drug use: No    Sexual activity: Defer       Review of Systems:  History obtained from chart review and the patient  General ROS: No fever or chills  Respiratory ROS: No cough, shortness of breath, wheezing  Cardiovascular ROS: No chest pain or palpitations  Gastrointestinal ROS: No abdominal pain or melena  Genito-Urinary ROS: No dysuria or hematuria  Psych ROS: No anxiety and depression  14 point ROS reviewed with the patient and negative except as noted above and in the HPI unless unable to obtain.    Objective:  Blood pressure: 114/66 mmHg  Heart rate is 78 bpm.  O2 saturation 100%.    General: awake/alert   HEENT: Normocephalic atraumatic head  Neck: Supple with no JVD carotid bruits.  Chest:  clear to auscultation bilaterally without respiratory distress  CVS: regular rate and rhythm  Abdominal: soft, nontender, positive bowel sounds  Extremities:  Left foot diabetic ulcer at plantar aspect  Skin: warm and dry without rash      Labs:  Results from last 7 days   Lab Units 23  0603 23  0913 23  0517   WBC 10*3/mm3 10.41 7.45 8.69   HEMOGLOBIN g/dL 8.7* 8.9* 8.7*   HEMATOCRIT % 28.6* 29.4* 28.8*   PLATELETS 10*3/mm3 330 328 310         Results from last 7 days   Lab Units 23  0603 23  0913 23  0517 23  0629   SODIUM mmol/L 139 137 140 140   POTASSIUM mmol/L 4.3 4.8 4.2 4.3   CHLORIDE mmol/L 101 101 103 105   CO2 mmol/L 24.0 24.0 25.0 23.0   BUN mg/dL 32*  24* 21 23   CREATININE mg/dL 2.46* 2.12* 1.95* 1.87*   CALCIUM mg/dL 9.2 9.1 8.9 9.2   EGFR mL/min/1.73 28.0* 33.5* 37.0* 38.9*   BILIRUBIN mg/dL  --   --   --  0.2   ALK PHOS U/L  --   --   --  106   ALT (SGPT) U/L  --   --   --  9   AST (SGOT) U/L  --   --   --  12   GLUCOSE mg/dL 150* 273* 118* 112*       Radiology:   Imaging Results (Last 72 Hours)       ** No results found for the last 72 hours. **            Culture:  No results found for: BLOODCX, URINECX, WOUNDCX, MRSACX, RESPCX, STOOLCX      Assessment   1.  Acute kidney injury/stage I.  2..Acute tubular necrosis  3.  Stage IIIb chronic kidney disease baseline.  3.  Status post debridement of diabetic foot ulcer  4.  Chronic diastolic CHF.  5.  Proteus wound infection  6.  Insulin-dependent type 2 diabetes.  7.  History of gastroparesis.  8.  Secondary hyperparathyroidism.  9.  Anemia of CKD.  10.  Type II diabetic nephropathy.    Plan:  1.  Hold diuretics.  2.  Continue calcitriol.  3.  Continue intravenous Ferrlecit   4.  Midodrine as needed for orthostatic hypotension      Brian Lomas MD  9/22/2023  12:37 CDT

## 2023-09-22 NOTE — PROGRESS NOTES
[facility]  ABUNDIO Page M.D.  SUSAN Haney        Internal Medicine Progress Note    9/22/2023   10:13 CDT    Name:  Erick Luong  MRN:    [id number]     Acct:     [id number]   Room:  [address] [place] [address] Day: 0     Admit Date: 9/18/2023  6:30 PM  PCP: Del Shetty MD    Subjective:     C/C: weakness, fatigue    Interval History: Status:  improved. Up to chair. No family at bedside. Woke from sleep. Afebrile. Maintaining adequate 02 sats on room air.  Continued decline in renal function. Counts otherwise stable. Tolerating treatment thus far. Staff reports patient not sleeping  well at night.     Review of Systems   Constitutional: Positive for malaise/fatigue. Negative for chills, decreased appetite, weight gain and weight loss.   HENT:  Negative for congestion, ear discharge, hoarse voice and tinnitus.    Eyes:  Negative for blurred vision, discharge, visual disturbance and visual halos.   Cardiovascular:  Negative for chest pain, claudication, dyspnea on exertion, irregular heartbeat, leg swelling, orthopnea and paroxysmal nocturnal dyspnea.   Respiratory:  Negative for cough, shortness of breath, sputum production and wheezing.    Endocrine: Negative for cold intolerance, heat intolerance and polyuria.   Hematologic/Lymphatic: Negative for adenopathy. Does not bruise/bleed easily.   Skin:  Positive for poor wound healing. Negative for dry skin, itching and suspicious lesions.   Musculoskeletal:  Positive for neck pain. Negative for arthritis, back pain, falls, joint pain, muscle weakness and myalgias.   Gastrointestinal:  Negative for abdominal pain, constipation, diarrhea, dysphagia and hematemesis.   Genitourinary:  Negative for bladder incontinence, dysuria and frequency.   Neurological:  Positive for weakness. Negative for aphonia, disturbances in coordination and dizziness.   Psychiatric/Behavioral:  Negative for altered mental status, depression, memory loss and substance abuse. The patient  "does not have insomnia and is not nervous/anxious.        Medications:     Allergies:   Allergies   Allergen Reactions    Cefepime Hives and Anaphylaxis    Bactrim [Sulfamethoxazole-Trimethoprim] Other (See Comments)     \"RENAL FAILURE\"    Vancomycin Itching    Zolpidem Unknown - High Severity     \"makes him crazy\"    Zolpidem Tartrate Unknown - Low Severity and Provider Review Needed    Metronidazole Rash       Current Meds:   Current Facility-Administered Medications:     acetaminophen (TYLENOL) tablet 650 mg, 650 mg, Oral, Q4H PRN **OR** acetaminophen (TYLENOL) suppository 650 mg, 650 mg, Rectal, Q4H PRN, Juan Manuel Page MD    aluminum-magnesium hydroxide-simethicone (MAALOX MAX) 400-400-40 MG/5ML suspension 30 mL, 30 mL, Oral, Q4H PRN, Juan Manuel Page MD    apixaban (ELIQUIS) tablet 5 mg, 5 mg, Oral, BID With Meals, Juan Manuel Page MD    bumetanide (BUMEX) tablet 1 mg, 1 mg, Oral, Daily, Juan Manuel Page MD    busPIRone (BUSPAR) tablet 10 mg, 10 mg, Oral, Q12H PRN, Juan Manuel Page MD    calcitriol (ROCALTROL) capsule 0.5 mcg, 0.5 mcg, Oral, Daily, Juan Manuel Page MD    carvedilol (COREG) tablet 3.125 mg, 3.125 mg, Oral, BID With Meals, Juan Manuel Page MD    cefTRIAXone (ROCEPHIN) 1,000 mg in sodium chloride 0.9 % 100 mL IVPB, 1,000 mg, Intravenous, Q24H, Juan Manuel Page MD    cholecalciferol (VITAMIN D3) tablet 5,000 Units, 5,000 Units, Oral, Daily, Juan Manuel Page MD    dextrose (D50W) (25 g/50 mL) IV injection 25 g, 25 g, Intravenous, Q15 Min PRN, Juan Manuel Page MD    dextrose (GLUTOSE) oral gel 15 g, 15 g, Oral, Q15 Min PRN, Juan Manuel Page MD    docusate sodium (COLACE) capsule 200 mg, 200 mg, Oral, BID, Juan Manuel Page MD    donepezil (ARICEPT) tablet 10 mg, 10 mg, Oral, Daily, Juan Manuel Page MD    [START ON 9/25/2023] Dulaglutide solution pen-injector 4.5 mg, 4.5 mg, Subcutaneous, Weekly, Juan Manuel Page MD   "  empagliflozin (JARDIANCE) tablet 25 mg, 25 mg, Oral, Daily, Juan Manuel Page MD    ferric gluconate (FERRLECIT) 125 mg in sodium chloride 0.9 % 100 mL IVPB, 125 mg, Intravenous, Q24H, Brian Lomas MD    glucagon (GLUCAGEN) injection 1 mg, 1 mg, Intramuscular, Q15 Min PRN, Juan Manuel Page MD    HYDROmorphone (DILAUDID) injection 1 mg, 1 mg, Intravenous, Q6H PRN, Juan Manuel Page MD    insulin detemir (LEVEMIR) injection 20 Units, 20 Units, Subcutaneous, Daily, Juan Manuel Page MD    Insulin Lispro (humaLOG) injection 2-9 Units, 2-9 Units, Subcutaneous, 4x Daily AC & at Bedtime, Juan Manuel Page MD    Lidocaine HCl 4 % gel gel 1 application , 1 application , Topical, PRN, Juan Manuel Page MD    magnesium hydroxide (MILK OF MAGNESIA) 400 MG/5ML suspension 15 mL, 15 mL, Oral, Daily PRN, Juan Manuel Page MD    metoclopramide (REGLAN) tablet 10 mg, 10 mg, Oral, TID AC, Juan Manuel Page MD    ondansetron (ZOFRAN) tablet 4 mg, 4 mg, Oral, Q6H PRN **OR** ondansetron (ZOFRAN) injection 4 mg, 4 mg, Intravenous, Q6H PRN, Juan Manuel Page MD    oxyCODONE (oxyCONTIN) 12 hr tablet 10 mg, 10 mg, Oral, Q12H, Juan Manuel Page MD    oxyCODONE (ROXICODONE) immediate release tablet 5 mg, 5 mg, Oral, Q4H PRN, Juan Manuel Page MD    pantoprazole (PROTONIX) EC tablet 40 mg, 40 mg, Oral, BID, Juan Manuel Page MD    polyethylene glycol (MIRALAX) packet 17 g, 17 g, Oral, Daily, Juan Manuel Page MD    pregabalin (LYRICA) capsule 100 mg, 100 mg, Oral, Daily, Juan Manuel Page MD    pregabalin (LYRICA) capsule 200 mg, 200 mg, Oral, Nightly, Juan Manuel Page MD    rosuvastatin (CRESTOR) tablet 40 mg, 40 mg, Oral, Nightly, Juan Manuel Page MD    saccharomyces boulardii (FLORASTOR) capsule 250 mg, 250 mg, Oral, BID, Juan Manuel Page MD    sennosides-docusate (PERICOLACE) 8.6-50 MG per tablet 1 tablet, 1 tablet, Oral, Nightly, Juan Manuel Page  MD Santana    sodium chloride 0.9 % flush 10 mL, 10 mL, Intravenous, Q12H, Juan Manuel Page MD    sodium chloride 0.9 % flush 10 mL, 10 mL, Intravenous, PRN, Juan Manuel Page MD    sodium hypochlorite (DAKIN'S 1/4 STRENGTH) 0.125 % topical solution 0.125% solution, , Topical, Q8H, Juan Manuel Page MD    sucralfate (CARAFATE) tablet 1 g, 1 g, Oral, TID AC, Juan Manuel Page MD    tamsulosin (FLOMAX) 24 hr capsule 0.4 mg, 0.4 mg, Oral, Daily, Juan Manuel Page MD    valsartan (DIOVAN) tablet 40 mg, 40 mg, Oral, Nightly, Juan Manuel Page MD    Data:     Code Status:    There are no questions and answers to display.       Family History   Problem Relation Age of Onset    Colon cancer Father     Heart disease Father     Colon cancer Sister     Colon polyps Sister     Alzheimer's disease Mother     Coronary artery disease Sister     Coronary artery disease Sister        Social History     Socioeconomic History    Marital status:    Tobacco Use    Smoking status: Former     Types: Cigarettes     Quit date:      Years since quittin.7    Smokeless tobacco: Never    Tobacco comments:     smoked in [place]   Vaping Use    Vaping Use: Never used   Substance and Sexual Activity    Alcohol use: No    Drug use: No    Sexual activity: Defer       Vitals:  T 97.5 P 89 R 18 Bp 138/56 Sp02 100% (room air)            I/O (24Hr):  No intake or output data in the 24 hours ending 23 1013    Labs and imaging:      Recent Results (from the past 12 hour(s))   Basic Metabolic Panel    Collection Time: 23  6:03 AM    Specimen: Blood   Result Value Ref Range    Glucose 150 (H) 65 - 99 mg/dL    BUN 32 (H) 8 - 23 mg/dL    Creatinine 2.46 (H) 0.76 - 1.27 mg/dL    Sodium 139 136 - 145 mmol/L    Potassium 4.3 3.5 - 5.2 mmol/L    Chloride 101 98 - 107 mmol/L    CO2 24.0 22.0 - 29.0 mmol/L    Calcium 9.2 8.6 - 10.5 mg/dL    BUN/Creatinine Ratio 13.0 7.0 - 25.0    Anion Gap 14.0 5.0 - 15.0  mmol/L    eGFR 28.0 (L) >60.0 mL/min/1.73   CBC Auto Differential    Collection Time: 09/22/23  6:03 AM    Specimen: Blood   Result Value Ref Range    WBC 10.41 3.40 - 10.80 10*3/mm3    RBC 3.23 (L) 4.14 - 5.80 10*6/mm3    Hemoglobin 8.7 (L) 13.0 - 17.7 g/dL    Hematocrit 28.6 (L) 37.5 - 51.0 %    MCV 88.5 79.0 - 97.0 fL    MCH 26.9 26.6 - 33.0 pg    MCHC 30.4 (L) 31.5 - 35.7 g/dL    RDW 15.7 (H) 12.3 - 15.4 %    RDW-SD 51.0 37.0 - 54.0 fl    MPV 11.9 6.0 - 12.0 fL    Platelets 330 140 - 450 10*3/mm3    Neutrophil % 71.8 42.7 - 76.0 %    Lymphocyte % 17.4 (L) 19.6 - 45.3 %    Monocyte % 7.2 5.0 - 12.0 %    Eosinophil % 2.4 0.3 - 6.2 %    Basophil % 0.8 0.0 - 1.5 %    Immature Grans % 0.4 0.0 - 0.5 %    Neutrophils, Absolute 7.48 (H) 1.70 - 7.00 10*3/mm3    Lymphocytes, Absolute 1.81 0.70 - 3.10 10*3/mm3    Monocytes, Absolute 0.75 0.10 - 0.90 10*3/mm3    Eosinophils, Absolute 0.25 0.00 - 0.40 10*3/mm3    Basophils, Absolute 0.08 0.00 - 0.20 10*3/mm3    Immature Grans, Absolute 0.04 0.00 - 0.05 10*3/mm3    nRBC 0.0 0.0 - 0.2 /100 WBC   POC Glucose Once    Collection Time: 09/22/23  7:48 AM    Specimen: Blood   Result Value Ref Range    Glucose 188 (H) 70 - 130 mg/dL                               Physical Examination:        Physical Exam  Vitals and nursing note reviewed.   Constitutional:       Appearance: Normal appearance. He is well-developed. He is obese.   HENT:      Head: Normocephalic and atraumatic.      Right Ear: External ear normal.      Left Ear: External ear normal.      Nose: Nose normal.      Mouth/Throat:      Mouth: Mucous membranes are moist.      Pharynx: Oropharynx is clear.   Eyes:      Pupils: Pupils are equal, round, and reactive to light.   Cardiovascular:      Rate and Rhythm: Normal rate. Rhythm irregular.      Heart sounds: Normal heart sounds.   Pulmonary:      Effort: Pulmonary effort is normal.      Breath sounds: Normal breath sounds.   Abdominal:      General: Bowel sounds are normal.       Palpations: Abdomen is soft.      Comments: obese   Musculoskeletal:         General: Normal range of motion.      Cervical back: Normal range of motion and neck supple.      Comments: Generalized weakness   Skin:     General: Skin is warm and dry.      Comments: Wound vac intact   Neurological:      Mental Status: He is alert and oriented to person, place, and time.      Deep Tendon Reflexes: Reflexes are normal and symmetric.   Psychiatric:         Behavior: Behavior normal.         Assessment:            * No active hospital problems. *    Past Medical History:   Diagnosis Date    Arthritis     Autonomic disease     CAD (coronary artery disease) 02/06/2017    Cervical radiculopathy 09/16/2021    Chronic constipation with acute exaccerbation 05/10/2021    Coronary artery disease     Degeneration of cervical intervertebral disc 08/11/2021    Diabetes mellitus     Diabetic foot ulcer 08/31/2020    Diabetic polyneuropathy associated with type 2 diabetes mellitus 01/18/2021    Elevated cholesterol     Gastroesophageal reflux disease 05/13/2019    Headache     HTN (hypertension), benign 05/03/2017    Hyperlipidemia     Hypertension     Mixed hyperlipidemia 02/07/2017    Multiple lung nodules 01/26/2020    5mm, 9 mm RLL identified 1/2020, not present 10/2019.    Myocardial infarction     Osteomyelitis 01/22/2020    Osteomyelitis of fifth toe of right foot 10/07/2019    Pancreatitis     Persistent insomnia 01/20/2020    Renal disorder     Sleep apnea 02/06/2017    Sleep apnea with use of continuous positive airway pressure (CPAP)     NON-COMPLIANT    Slow transit constipation 01/16/2019    Spinal stenosis in cervical region 09/16/2021    Vitamin D deficiency 03/02/2021        Plan:        Proteus wound infection s/p debridement  New onset atrial fibrillation  Acute on chronic diastolic CHF  CKD3  Multifactorial anemia  DM2 with hyperglycemia on long term insulin  BRITTNI  Chronic pain syndrome  Chronic neck pain with  radiculopathy  Diabetic peripheral neuropathy  Hx CAD  GERD  Possible gastroparesis  Continue current treatment. Monitor counts. Increase activity. Labs Monday. Wound care per wound care team. Continue IV antibiotics as tolerated. Appreciate nephrology evaluation and treatment. Continue glycemic control efforts. The patient requires continued inpatient treatment due to the need for daily physician assessments related to atrial fibrillation, CKD and other comorbidities as well as ongoing wound care with wound vac changes and recurrent debridements.       Electronically signed by SUSAN Muñoz on 9/22/2023 at 10:13 CDT     I have discussed the care of Erick Luong, including pertinent history and exam findings, with the nurse practitioner.    I have seen and examined the patient and the key elements of all parts of the encounter have been performed by me.  I agree with the assessment, plan and orders as documented by SUSAN Haney, after I modified the exam findings and the plan of treatments and the final version is my approved version of the assessment.        Electronically signed by Juan Manuel Page MD on 9/22/2023 at 20:49 CDT

## 2023-09-23 LAB
GLUCOSE BLDC GLUCOMTR-MCNC: 163 MG/DL (ref 70–130)
GLUCOSE BLDC GLUCOMTR-MCNC: 218 MG/DL (ref 70–130)
GLUCOSE BLDC GLUCOMTR-MCNC: 221 MG/DL (ref 70–130)
GLUCOSE BLDC GLUCOMTR-MCNC: 295 MG/DL (ref 70–130)

## 2023-09-23 PROCEDURE — 97110 THERAPEUTIC EXERCISES: CPT

## 2023-09-23 PROCEDURE — 25010000002 CEFTRIAXONE PER 250 MG: Performed by: INTERNAL MEDICINE

## 2023-09-23 PROCEDURE — 82948 REAGENT STRIP/BLOOD GLUCOSE: CPT

## 2023-09-23 PROCEDURE — 25010000002 HYDROMORPHONE 1 MG/ML SOLUTION: Performed by: INTERNAL MEDICINE

## 2023-09-23 PROCEDURE — 25010000002 NA FERRIC GLUC CPLX PER 12.5 MG: Performed by: INTERNAL MEDICINE

## 2023-09-23 PROCEDURE — 63710000001 INSULIN LISPRO (HUMAN) PER 5 UNITS: Performed by: INTERNAL MEDICINE

## 2023-09-23 PROCEDURE — 63710000001 INSULIN DETEMIR PER 5 UNITS: Performed by: INTERNAL MEDICINE

## 2023-09-23 NOTE — PROGRESS NOTES
[facility]  ABUNDIO Page M.D.  SUSAN Haney        Internal Medicine Progress Note    9/23/2023   07:11 CDT    Name:  Erick Luong  MRN:    [id number]     Acct:     [id number]   Room:  [address] [place] [address] Day: 0     Admit Date: 9/18/2023  6:30 PM  PCP: Del Shetty MD    Subjective:     C/C: weakness, fatigue    Interval History: Status:  improved. Resting in bed. No family at bedside. BS stable. Wound debrided yesterday and wound vac applied per wound NP. RN reports patient will take gown off and get out of bed once wife leaves for evening. He is AOx3 but states he doesn't know why he behaves that way once she leaves for evening.      Review of Systems   Constitutional: Positive for malaise/fatigue. Negative for chills, decreased appetite, weight gain and weight loss.   HENT:  Negative for congestion, ear discharge, hoarse voice and tinnitus.    Eyes:  Negative for blurred vision, discharge, visual disturbance and visual halos.   Cardiovascular:  Negative for chest pain, claudication, dyspnea on exertion, irregular heartbeat, leg swelling, orthopnea and paroxysmal nocturnal dyspnea.   Respiratory:  Negative for cough, shortness of breath, sputum production and wheezing.    Endocrine: Negative for cold intolerance, heat intolerance and polyuria.   Hematologic/Lymphatic: Negative for adenopathy. Does not bruise/bleed easily.   Skin:  Positive for poor wound healing. Negative for dry skin, itching and suspicious lesions.   Musculoskeletal:  Positive for neck pain. Negative for arthritis, back pain, falls, joint pain, muscle weakness and myalgias.   Gastrointestinal:  Negative for abdominal pain, constipation, diarrhea, dysphagia and hematemesis.   Genitourinary:  Negative for bladder incontinence, dysuria and frequency.   Neurological:  Positive for weakness. Negative for aphonia, disturbances in coordination and dizziness.   Psychiatric/Behavioral:  Negative for altered mental status, depression,  "memory loss and substance abuse. The patient does not have insomnia and is not nervous/anxious.        Medications:     Allergies:   Allergies   Allergen Reactions    Cefepime Hives and Anaphylaxis    Bactrim [Sulfamethoxazole-Trimethoprim] Other (See Comments)     \"RENAL FAILURE\"    Vancomycin Itching    Zolpidem Unknown - High Severity     \"makes him crazy\"    Zolpidem Tartrate Unknown - Low Severity and Provider Review Needed    Metronidazole Rash       Current Meds:   Current Facility-Administered Medications:     acetaminophen (TYLENOL) tablet 650 mg, 650 mg, Oral, Q4H PRN **OR** acetaminophen (TYLENOL) suppository 650 mg, 650 mg, Rectal, Q4H PRN, Juan Manuel Page MD    aluminum-magnesium hydroxide-simethicone (MAALOX MAX) 400-400-40 MG/5ML suspension 30 mL, 30 mL, Oral, Q4H PRN, Juan Manuel Page MD    apixaban (ELIQUIS) tablet 5 mg, 5 mg, Oral, BID With Meals, Juan Manuel Page MD    bumetanide (BUMEX) tablet 1 mg, 1 mg, Oral, Daily, Juan Manuel Page MD    busPIRone (BUSPAR) tablet 10 mg, 10 mg, Oral, Q12H PRN, Juan Manuel Page MD    calcitriol (ROCALTROL) capsule 0.5 mcg, 0.5 mcg, Oral, Daily, Juan Manuel Page MD    carvedilol (COREG) tablet 3.125 mg, 3.125 mg, Oral, BID With Meals, Juan Manuel Page MD    cefTRIAXone (ROCEPHIN) 1,000 mg in sodium chloride 0.9 % 100 mL IVPB, 1,000 mg, Intravenous, Q24H, Juan Manuel Page MD    cholecalciferol (VITAMIN D3) tablet 5,000 Units, 5,000 Units, Oral, Daily, Juan Manuel Page MD    dextrose (D50W) (25 g/50 mL) IV injection 25 g, 25 g, Intravenous, Q15 Min PRN, Juan Manuel Page MD    dextrose (GLUTOSE) oral gel 15 g, 15 g, Oral, Q15 Min PRN, Juan Manuel Page MD    docusate sodium (COLACE) capsule 200 mg, 200 mg, Oral, BID, Juan Manuel Page MD    donepezil (ARICEPT) tablet 10 mg, 10 mg, Oral, Daily, Juan Manuel Page MD    [START ON 9/25/2023] Dulaglutide solution pen-injector 4.5 mg, 4.5 mg, " Subcutaneous, Weekly, Juan Manuel Page MD    empagliflozin (JARDIANCE) tablet 25 mg, 25 mg, Oral, Daily, Juan Manuel Page MD    ferric gluconate (FERRLECIT) 125 mg in sodium chloride 0.9 % 100 mL IVPB, 125 mg, Intravenous, Q24H, Brian Lomas MD    glucagon (GLUCAGEN) injection 1 mg, 1 mg, Intramuscular, Q15 Min PRN, Juan Manuel Page MD    HYDROmorphone (DILAUDID) injection 1 mg, 1 mg, Intravenous, Q6H PRN, Juan Manuel Page MD    insulin detemir (LEVEMIR) injection 20 Units, 20 Units, Subcutaneous, Daily, Juan Manuel Page MD    Insulin Lispro (humaLOG) injection 2-9 Units, 2-9 Units, Subcutaneous, 4x Daily AC & at Bedtime, Juan Manuel Page MD    Lidocaine HCl 4 % gel gel 1 application , 1 application , Topical, PRN, Juan Manuel Page MD    magnesium hydroxide (MILK OF MAGNESIA) 400 MG/5ML suspension 15 mL, 15 mL, Oral, Daily PRN, Juan Manuel Page MD    melatonin tablet 6 mg, 6 mg, Oral, Nightly, Shruthi Isabel, APRSHERRILL    metoclopramide (REGLAN) tablet 5 mg, 5 mg, Oral, TID AC, Juan Manuel Page MD    ondansetron (ZOFRAN) tablet 4 mg, 4 mg, Oral, Q6H PRN **OR** ondansetron (ZOFRAN) injection 4 mg, 4 mg, Intravenous, Q6H PRN, Juan Manuel Page MD    oxyCODONE (oxyCONTIN) 12 hr tablet 10 mg, 10 mg, Oral, Q12H, Juan Manuel Page MD    oxyCODONE (ROXICODONE) immediate release tablet 5 mg, 5 mg, Oral, Q4H PRN, Juan Manuel Page MD    pantoprazole (PROTONIX) EC tablet 40 mg, 40 mg, Oral, BID, Juan Manuel Page MD    polyethylene glycol (MIRALAX) packet 17 g, 17 g, Oral, Daily, Juan Manuel Page MD    pregabalin (LYRICA) capsule 100 mg, 100 mg, Oral, Daily, Juan Manuel Page MD    pregabalin (LYRICA) capsule 200 mg, 200 mg, Oral, Nightly, Juan Manuel Page MD    rosuvastatin (CRESTOR) tablet 40 mg, 40 mg, Oral, Nightly, Juan Manuel Page MD    saccharomyces boulardii (FLORASTOR) capsule 250 mg, 250 mg, Oral, BID, Juan Manuel Page  MD Santana    sennosides-docusate (PERICOLACE) 8.6-50 MG per tablet 1 tablet, 1 tablet, Oral, Nightly, Juan Manuel Page MD    sodium chloride 0.9 % flush 10 mL, 10 mL, Intravenous, Q12H, Juan Manuel Page MD    sodium chloride 0.9 % flush 10 mL, 10 mL, Intravenous, PRN, Juan Manuel Page MD    sucralfate (CARAFATE) tablet 1 g, 1 g, Oral, TID AC, Juan Manuel Page MD    tamsulosin (FLOMAX) 24 hr capsule 0.4 mg, 0.4 mg, Oral, Daily, Juan Manuel Page MD    valsartan (DIOVAN) tablet 40 mg, 40 mg, Oral, Nightly, Juan Manuel Page MD    Data:     Code Status:    There are no questions and answers to display.       Family History   Problem Relation Age of Onset    Colon cancer Father     Heart disease Father     Colon cancer Sister     Colon polyps Sister     Alzheimer's disease Mother     Coronary artery disease Sister     Coronary artery disease Sister        Social History     Socioeconomic History    Marital status:    Tobacco Use    Smoking status: Former     Types: Cigarettes     Quit date:      Years since quittin.7    Smokeless tobacco: Never    Tobacco comments:     smoked in [place]   Vaping Use    Vaping Use: Never used   Substance and Sexual Activity    Alcohol use: No    Drug use: No    Sexual activity: Defer       Vitals:  T 98.7 P 90 R 18 Bp 132/74 Sp02 100% (room air)            I/O (24Hr):  No intake or output data in the 24 hours ending 23 0711    Labs and imaging:      Recent Results (from the past 12 hour(s))   POC Glucose Once    Collection Time: 23  7:29 PM    Specimen: Blood   Result Value Ref Range    Glucose 297 (H) 70 - 130 mg/dL                               Physical Examination:        Physical Exam  Vitals and nursing note reviewed.   Constitutional:       Appearance: Normal appearance. He is well-developed. He is obese.   HENT:      Head: Normocephalic and atraumatic.      Right Ear: External ear normal.      Left Ear: External ear  normal.      Nose: Nose normal.      Mouth/Throat:      Mouth: Mucous membranes are moist.      Pharynx: Oropharynx is clear.   Eyes:      Pupils: Pupils are equal, round, and reactive to light.   Cardiovascular:      Rate and Rhythm: Normal rate. Rhythm irregular.      Heart sounds: Normal heart sounds.   Pulmonary:      Effort: Pulmonary effort is normal.      Breath sounds: Normal breath sounds.   Abdominal:      General: Bowel sounds are normal.      Palpations: Abdomen is soft.      Comments: obese   Musculoskeletal:         General: Normal range of motion.      Cervical back: Normal range of motion and neck supple.      Comments: Generalized weakness   Skin:     General: Skin is warm and dry.      Comments: Wound vac intact   Neurological:      Mental Status: He is alert and oriented to person, place, and time.      Deep Tendon Reflexes: Reflexes are normal and symmetric.   Psychiatric:         Behavior: Behavior normal.         Assessment:            * No active hospital problems. *    Past Medical History:   Diagnosis Date    Arthritis     Autonomic disease     CAD (coronary artery disease) 02/06/2017    Cervical radiculopathy 09/16/2021    Chronic constipation with acute exaccerbation 05/10/2021    Coronary artery disease     Degeneration of cervical intervertebral disc 08/11/2021    Diabetes mellitus     Diabetic foot ulcer 08/31/2020    Diabetic polyneuropathy associated with type 2 diabetes mellitus 01/18/2021    Elevated cholesterol     Gastroesophageal reflux disease 05/13/2019    Headache     HTN (hypertension), benign 05/03/2017    Hyperlipidemia     Hypertension     Mixed hyperlipidemia 02/07/2017    Multiple lung nodules 01/26/2020    5mm, 9 mm RLL identified 1/2020, not present 10/2019.    Myocardial infarction     Osteomyelitis 01/22/2020    Osteomyelitis of fifth toe of right foot 10/07/2019    Pancreatitis     Persistent insomnia 01/20/2020    Renal disorder     Sleep apnea 02/06/2017    Sleep  apnea with use of continuous positive airway pressure (CPAP)     NON-COMPLIANT    Slow transit constipation 01/16/2019    Spinal stenosis in cervical region 09/16/2021    Vitamin D deficiency 03/02/2021        Plan:        Proteus wound infection s/p debridement  New onset atrial fibrillation  Acute on chronic diastolic CHF  CKD3  Multifactorial anemia  DM2 with hyperglycemia on long term insulin  BRITTNI  Chronic pain syndrome  Chronic neck pain with radiculopathy  Diabetic peripheral neuropathy  Hx CAD  GERD  Possible gastroparesis  Continue current treatment. Monitor counts. Increase activity. Labs Monday. Wound care per wound care team. Continue IV antibiotics as tolerated. Appreciate nephrology evaluation and treatment. Continue glycemic control efforts. The patient requires continued inpatient treatment due to the need for daily physician assessments related to atrial fibrillation, CKD and other comorbidities as well as ongoing wound care with wound vac changes and recurrent debridements.       Electronically signed by SUSAN Bynum on 9/23/2023 at 07:11 CDT

## 2023-09-23 NOTE — PROGRESS NOTES
Nephrology ([facility]) Progress Note      Patient:  Erick Luong  YOB: 1955  Date of Service: 9/23/2023  MRN: [id number]   Acct: [id number]   Primary Care Physician: Del Shetty MD  Advance Directive:   There are no questions and answers to display.     Admit Date: 9/18/2023       Hospital Day: 0  Referring Provider: Juan Manuel Page MD      Patient personally seen and examined.  Complete chart including Consults, Notes, Operative Reports, Labs, Cardiology, and Radiology studies reviewed as able.    Chief complaint: Abnormal labs.    Subjective:  Erick Luong is a 68 y.o. male  whom we were consulted for chronic kidney disease stage IIIb.  He has stage IIIb chronic kidney disease baseline, follows me in the office as he has diabetic nephropathy.  He has history of insulin-dependent type 2 diabetes, hypertension, abdominal obesity, obstructive sleep apnea, diabetic foot ulcer and coronary artery disease.  He was accepted as a transfer from [facility].  Patient was admitted at [facility] by Dr. Gomes and extensive debridement of left foot wound at plantar aspect.  Postoperative wound measuring 6 x 17 x 4 cm with exposed bone.  Surgical culture returned positive for Proteus Mirabills.  Patient needed long-term IV antibiotics.  He is now admitted for wound care, long-term IV antibiotics, chronic kidney disease and treatment of diastolic congestive heart failure.  Patient also has history of gastroparesis.       This morning patient is still very weak.  He is sitting up in chair.  He denies any shortness of breath.      Allergies:  Cefepime, Bactrim [sulfamethoxazole-trimethoprim], Vancomycin, Zolpidem, Zolpidem tartrate, and Metronidazole    Home Meds:  Medications Prior to Admission   Medication Sig Dispense Refill Last Dose    amitriptyline (ELAVIL) 25 MG tablet Take 2 tablets by mouth Daily at bedtime. 60 tablet 1     amoxicillin-clavulanate  (Augmentin) 500-125 MG per tablet Take 1 tablet by mouth every 12 hours with meals for 7 days. 14 tablet 0     ascorbic acid (VITAMIN C) 1000 MG tablet Take 1 tablet by mouth Daily. 30 tablet 3     Aspirin 81 MG capsule Take 81 mg by mouth Daily.       bumetanide (BUMEX) 2 MG tablet Take 1 tablet by mouth Daily. Take 2 tablets in morning;1 tablet at night       busPIRone (BUSPAR) 10 MG tablet Take 1 tablet by mouth 2 (Two) Times a Day As Needed. 100 tablet 3     calcitriol (ROCALTROL) 0.5 MCG capsule Take 1 capsule by mouth Daily. 90 capsule 4     carvedilol (COREG) 6.25 MG tablet Take 1 tablet by mouth 2 (Two) Times a Day. 180 tablet 4     Cholecalciferol (D-5000) 125 MCG (5000 UT) tablet Take 1 tablet by mouth Daily Before Lunch. 30 tablet 2     cloNIDine (CATAPRES) 0.1 MG tablet Take 1 tablet by mouth Every 12 (Twelve) Hours. 60 tablet 2     Diclofenac Sodium (VOLTAREN) 1 % gel gel Apply 2 g topically to the appropriate area as directed 4 (Four) Times a Day As Needed. 300 g 11     donepezil (ARICEPT) 10 MG tablet Take 1 tablet by mouth Daily. 90 tablet 4     Dulaglutide (Trulicity) 4.5 MG/0.5ML solution pen-injector Inject 0.5 mL under the skin into the appropriate area as directed 1 (One) Time Per Week. 2 mL 11     empagliflozin (JARDIANCE) 25 MG tablet tablet Take 1 tablet by mouth Daily. 30 tablet 2     HYDROcodone-acetaminophen (NORCO) 7.5-325 MG per tablet Take 1 tablet by mouth 2 (Two) Times a Day As Needed. 40 tablet 0     Insulin Regular Human, Conc, (HumuLIN R U-500 KwikPen) 500 UNIT/ML solution pen-injector CONCENTRATED injection Inject 120 Units under the skin into the appropriate area as directed 2 (Two) Times a Day with breakfast and dinner. (Patient taking differently: Inject 120 Units under the skin into the appropriate area as directed 3 (Three) Times a Day Before Meals.) 18 mL 5     midodrine (PROAMATINE) 10 MG tablet Take 1 tablet by mouth 3 (Three) Times a Day. 270 tablet 4     ondansetron ODT  (ZOFRAN-ODT) 8 MG disintegrating tablet Place 1 tablet under tongue 3 times a day as needed. 25 tablet 1     pantoprazole (Protonix) 40 MG EC tablet Take 1 tablet by mouth 2 (Two) Times a Day. 180 tablet 4     polyethylene glycol (MIRALAX) 17 g packet Take 17 g by mouth Daily. Obtain OTC       pregabalin (LYRICA) 100 MG capsule Take 1 capsule by mouth Daily With Breakfast AND 2 capsules Every Night. 90 capsule 2     rosuvastatin (CRESTOR) 40 MG tablet Take 1 tablet by mouth Every Night. 90 tablet 3     sennosides-docusate (PERICOLACE) 8.6-50 MG per tablet Take 1 tablet by mouth Every Night. Obtain OTC       sennosides-docusate (PERICOLACE) 8.6-50 MG per tablet Take 1 tablet by mouth every night at bedtime. 30 tablet 2     sodium hypochlorite (DAKIN'S 1/4 STRENGTH) 0.125 % solution topical solution 0.125% Apply to affected area twice daily 473 mL 3     tamsulosin (FLOMAX) 0.4 MG capsule 24 hr capsule Take 1 capsule by mouth Daily. 90 capsule 1        Medicines:  Current Facility-Administered Medications   Medication Dose Route Frequency Provider Last Rate Last Admin    acetaminophen (TYLENOL) tablet 650 mg  650 mg Oral Q4H PRN Juan Manuel Page MD        Or    acetaminophen (TYLENOL) suppository 650 mg  650 mg Rectal Q4H PRN Juan Manuel Page MD        aluminum-magnesium hydroxide-simethicone (MAALOX MAX) 400-400-40 MG/5ML suspension 30 mL  30 mL Oral Q4H PRN Juan Manuel Page MD        apixaban (ELIQUIS) tablet 5 mg  5 mg Oral BID With Meals Juan Manuel Page MD        bumetanide (BUMEX) tablet 1 mg  1 mg Oral Daily Juan Manuel Page MD        busPIRone (BUSPAR) tablet 10 mg  10 mg Oral Q12H PRN Juan Manuel Page MD        calcitriol (ROCALTROL) capsule 0.5 mcg  0.5 mcg Oral Daily Juan Manuel Page MD        carvedilol (COREG) tablet 3.125 mg  3.125 mg Oral BID With Meals Juan Manuel Page MD        cefTRIAXone (ROCEPHIN) 1,000 mg in sodium chloride 0.9 % 100 mL IVPB  1,000 mg  Intravenous Q24H Juan Manuel Page MD        cholecalciferol (VITAMIN D3) tablet 5,000 Units  5,000 Units Oral Daily Juan Manuel Page MD        dextrose (D50W) (25 g/50 mL) IV injection 25 g  25 g Intravenous Q15 Min PRN Juan Manuel Page MD        dextrose (GLUTOSE) oral gel 15 g  15 g Oral Q15 Min PRN Juan aMnuel Page MD        docusate sodium (COLACE) capsule 200 mg  200 mg Oral BID Juan Manuel Page MD        donepezil (ARICEPT) tablet 10 mg  10 mg Oral Daily Juan Manuel Page MD        [START ON 9/25/2023] Dulaglutide solution pen-injector 4.5 mg  4.5 mg Subcutaneous Weekly Juan Manuel Page MD        empagliflozin (JARDIANCE) tablet 25 mg  25 mg Oral Daily Juan Manuel Page MD        ferric gluconate (FERRLECIT) 125 mg in sodium chloride 0.9 % 100 mL IVPB  125 mg Intravenous Q24H Brian Lomas MD        glucagon (GLUCAGEN) injection 1 mg  1 mg Intramuscular Q15 Min PRN Juan Manuel Page MD        HYDROmorphone (DILAUDID) injection 1 mg  1 mg Intravenous Q6H PRN Juan Manuel Page MD        insulin detemir (LEVEMIR) injection 20 Units  20 Units Subcutaneous Daily Juan Manuel Page MD        Insulin Lispro (humaLOG) injection 2-9 Units  2-9 Units Subcutaneous 4x Daily AC & at Bedtime Juan Manuel Page MD        Lidocaine HCl 4 % gel gel 1 application   1 application  Topical PRN Juan Manuel Page MD        magnesium hydroxide (MILK OF MAGNESIA) 400 MG/5ML suspension 15 mL  15 mL Oral Daily PRN Juan Manuel Page MD        melatonin tablet 6 mg  6 mg Oral Nightly Shruthi Isabel APRN        metoclopramide (REGLAN) tablet 5 mg  5 mg Oral TID AC Juan Manuel Page MD        ondansetron (ZOFRAN) tablet 4 mg  4 mg Oral Q6H PRN Juan Manuel Page MD        Or    ondansetron (ZOFRAN) injection 4 mg  4 mg Intravenous Q6H PRN Juan Manuel Page MD        oxyCODONE (oxyCONTIN) 12 hr tablet 10 mg  10 mg Oral Q12H Juan Manuel Page  MD        oxyCODONE (ROXICODONE) immediate release tablet 5 mg  5 mg Oral Q4H PRN Juan Manuel Page MD        pantoprazole (PROTONIX) EC tablet 40 mg  40 mg Oral BID Juan Manuel Page MD        polyethylene glycol (MIRALAX) packet 17 g  17 g Oral Daily Juan Manuel Page MD        pregabalin (LYRICA) capsule 100 mg  100 mg Oral Daily Juan Manuel Page MD        pregabalin (LYRICA) capsule 200 mg  200 mg Oral Nightly Juan Manuel Page MD        rosuvastatin (CRESTOR) tablet 40 mg  40 mg Oral Nightly Juan Manuel Page MD        saccharomyces boulardii (FLORASTOR) capsule 250 mg  250 mg Oral BID Juan Manuel Page MD        sennosides-docusate (PERICOLACE) 8.6-50 MG per tablet 1 tablet  1 tablet Oral Nightly Juan Manuel Page MD        sodium chloride 0.9 % flush 10 mL  10 mL Intravenous Q12H Juan Manuel Page MD        sodium chloride 0.9 % flush 10 mL  10 mL Intravenous PRN Juan Manuel Page MD        sucralfate (CARAFATE) tablet 1 g  1 g Oral TID AC Juan Manuel Page MD        tamsulosin (FLOMAX) 24 hr capsule 0.4 mg  0.4 mg Oral Daily Juan Manuel Page MD        valsartan (DIOVAN) tablet 40 mg  40 mg Oral Nightly Juan Manuel Page MD           Past Medical History:  Past Medical History:   Diagnosis Date    Arthritis     Autonomic disease     CAD (coronary artery disease) 02/06/2017    Cervical radiculopathy 09/16/2021    Chronic constipation with acute exaccerbation 05/10/2021    Coronary artery disease     Degeneration of cervical intervertebral disc 08/11/2021    Diabetes mellitus     Diabetic foot ulcer 08/31/2020    Diabetic polyneuropathy associated with type 2 diabetes mellitus 01/18/2021    Elevated cholesterol     Gastroesophageal reflux disease 05/13/2019    Headache     HTN (hypertension), benign 05/03/2017    Hyperlipidemia     Hypertension     Mixed hyperlipidemia 02/07/2017    Multiple lung nodules 01/26/2020    5mm, 9 mm RLL identified  1/2020, not present 10/2019.    Myocardial infarction     Osteomyelitis 01/22/2020    Osteomyelitis of fifth toe of right foot 10/07/2019    Pancreatitis     Persistent insomnia 01/20/2020    Renal disorder     Sleep apnea 02/06/2017    Sleep apnea with use of continuous positive airway pressure (CPAP)     NON-COMPLIANT    Slow transit constipation 01/16/2019    Spinal stenosis in cervical region 09/16/2021    Vitamin D deficiency 03/02/2021       Past Surgical History:  Past Surgical History:   Procedure Laterality Date    ABDOMINAL SURGERY      AMPUTATION FOOT / TOE Left 10/2021    5th digit     ANTERIOR CERVICAL DISCECTOMY W/ FUSION N/A 8/5/2022    Procedure: CERVICAL DISCECTOMY ANTERIOR WITH FUSION C5-6 with possible plating of C5-7 with neuromonitoring and 1 c-arm;  Surgeon: Karel Soliz MD;  Location:  [facility] OR;  Service: Neurosurgery;  Laterality: N/A;    APPENDECTOMY      BACK SURGERY      CARDIAC CATHETERIZATION Left 02/08/2021    Procedure: Left Heart Cath w poss intervention left anatomical snuff box acess;  Surgeon: Omkar Charles DO;  Location:  [facility] CATH INVASIVE LOCATION;  Service: Cardiology;  Laterality: Left;    CARDIAC SURGERY      CATARACT EXTRACTION      CERVICAL SPINE SURGERY      COLONOSCOPY N/A 01/31/2017    Normal exam repeat in 5 years    COLONOSCOPY N/A 02/11/2019    Mild acute inflammation    COLONOSCOPY W/ POLYPECTOMY  03/04/2014    Hyperplastic polyp    CORONARY ARTERY BYPASS GRAFT  10/2015    ENDOSCOPY  04/13/2011    Gastritis with hemorrhage    ENDOSCOPY N/A 05/05/2017    Normal exam    ENDOSCOPY N/A 02/11/2019    Gastritis    ENDOSCOPY N/A 09/01/2020    Non-erosive gastritis with hemorrhage    ENDOSCOPY N/A 02/10/2021    Esophagitis    FOOT SURGERY Left     INCISION AND DRAINAGE OF WOUND Left 09/2007    spider bite       Family History  Family History   Problem Relation Age of Onset    Colon cancer Father     Heart disease Father     Colon cancer Sister     Colon  polyps Sister     Alzheimer's disease Mother     Coronary artery disease Sister     Coronary artery disease Sister        Social History  Social History     Socioeconomic History    Marital status:    Tobacco Use    Smoking status: Former     Types: Cigarettes     Quit date:      Years since quittin.7    Smokeless tobacco: Never    Tobacco comments:     smoked in highschool   Vaping Use    Vaping Use: Never used   Substance and Sexual Activity    Alcohol use: No    Drug use: No    Sexual activity: Defer       Review of Systems:  History obtained from chart review and the patient  General ROS: No fever or chills  Respiratory ROS: No cough, shortness of breath, wheezing  Cardiovascular ROS: No chest pain or palpitations  Gastrointestinal ROS: No abdominal pain or melena  Genito-Urinary ROS: No dysuria or hematuria  Psych ROS: No anxiety and depression  14 point ROS reviewed with the patient and negative except as noted above and in the HPI unless unable to obtain.    Objective:  Blood pressure: 132/74 mmHg.  Heart rate is 90 bpm  O2 saturation 100%.    General: awake/alert   HEENT: Normocephalic atraumatic head  Neck: Supple with no JVD carotid bruits.  Chest:  clear to auscultation bilaterally without respiratory distress  CVS: regular rate and rhythm  Abdominal: soft, nontender, positive bowel sounds  Extremities:  Left foot diabetic ulcer at plantar aspect  Skin: warm and dry without rash      Labs:  Results from last 7 days   Lab Units 23  0603 23  0913 23  0517   WBC 10*3/mm3 10.41 7.45 8.69   HEMOGLOBIN g/dL 8.7* 8.9* 8.7*   HEMATOCRIT % 28.6* 29.4* 28.8*   PLATELETS 10*3/mm3 330 328 310         Results from last 7 days   Lab Units 23  0603 23  0913 23  0517 23  0629   SODIUM mmol/L 139 137 140 140   POTASSIUM mmol/L 4.3 4.8 4.2 4.3   CHLORIDE mmol/L 101 101 103 105   CO2 mmol/L 24.0 24.0 25.0 23.0   BUN mg/dL 32* 24* 21 23   CREATININE mg/dL 2.46* 2.12*  1.95* 1.87*   CALCIUM mg/dL 9.2 9.1 8.9 9.2   EGFR mL/min/1.73 28.0* 33.5* 37.0* 38.9*   BILIRUBIN mg/dL  --   --   --  0.2   ALK PHOS U/L  --   --   --  106   ALT (SGPT) U/L  --   --   --  9   AST (SGOT) U/L  --   --   --  12   GLUCOSE mg/dL 150* 273* 118* 112*       Radiology:   Imaging Results (Last 72 Hours)       ** No results found for the last 72 hours. **            Culture:  No results found for: BLOODCX, URINECX, WOUNDCX, MRSACX, RESPCX, STOOLCX      Assessment   1.  Acute kidney injury/stage I.  2..Acute tubular necrosis  3.  Stage IIIb chronic kidney disease baseline.  3.  Status post debridement of diabetic foot ulcer  4.  Chronic diastolic CHF.  5.  Proteus wound infection  6.  Insulin-dependent type 2 diabetes.  7.  History of gastroparesis.  8.  Secondary hyperparathyroidism.  9.  Anemia of CKD.  10.  Type II diabetic nephropathy.    Plan:  1.  Get routine labs tomorrow.  2.  Continue calcitriol.  3.  Continue intravenous Ferrlecit   4.  REGIS Lomas MD  9/23/2023  14:24 CDT

## 2023-09-24 LAB
ANION GAP SERPL CALCULATED.3IONS-SCNC: 10 MMOL/L (ref 5–15)
BUN SERPL-MCNC: 35 MG/DL (ref 8–23)
BUN/CREAT SERPL: 12.3 (ref 7–25)
CALCIUM SPEC-SCNC: 8.8 MG/DL (ref 8.6–10.5)
CHLORIDE SERPL-SCNC: 103 MMOL/L (ref 98–107)
CO2 SERPL-SCNC: 26 MMOL/L (ref 22–29)
CREAT SERPL-MCNC: 2.84 MG/DL (ref 0.76–1.27)
EGFRCR SERPLBLD CKD-EPI 2021: 23.6 ML/MIN/1.73
GLUCOSE BLDC GLUCOMTR-MCNC: 137 MG/DL (ref 70–130)
GLUCOSE BLDC GLUCOMTR-MCNC: 255 MG/DL (ref 70–130)
GLUCOSE BLDC GLUCOMTR-MCNC: 273 MG/DL (ref 70–130)
GLUCOSE BLDC GLUCOMTR-MCNC: 99 MG/DL (ref 70–130)
GLUCOSE SERPL-MCNC: 90 MG/DL (ref 65–99)
POTASSIUM SERPL-SCNC: 4.2 MMOL/L (ref 3.5–5.2)
SODIUM SERPL-SCNC: 139 MMOL/L (ref 136–145)

## 2023-09-24 PROCEDURE — 97110 THERAPEUTIC EXERCISES: CPT

## 2023-09-24 PROCEDURE — 82948 REAGENT STRIP/BLOOD GLUCOSE: CPT

## 2023-09-24 PROCEDURE — 25010000002 CEFTRIAXONE PER 250 MG: Performed by: INTERNAL MEDICINE

## 2023-09-24 PROCEDURE — 80048 BASIC METABOLIC PNL TOTAL CA: CPT | Performed by: INTERNAL MEDICINE

## 2023-09-24 PROCEDURE — 25010000002 NA FERRIC GLUC CPLX PER 12.5 MG: Performed by: INTERNAL MEDICINE

## 2023-09-24 PROCEDURE — 63710000001 INSULIN DETEMIR PER 5 UNITS: Performed by: INTERNAL MEDICINE

## 2023-09-24 PROCEDURE — 97530 THERAPEUTIC ACTIVITIES: CPT

## 2023-09-24 PROCEDURE — 25010000002 HYDROMORPHONE 1 MG/ML SOLUTION: Performed by: INTERNAL MEDICINE

## 2023-09-24 PROCEDURE — 63710000001 INSULIN LISPRO (HUMAN) PER 5 UNITS: Performed by: INTERNAL MEDICINE

## 2023-09-24 RX ORDER — OXYCODONE HYDROCHLORIDE 5 MG/1
5 TABLET ORAL EVERY 4 HOURS PRN
Status: DISPENSED | OUTPATIENT
Start: 2023-09-24 | End: 2023-10-01

## 2023-09-24 RX ORDER — OXYCODONE HCL 10 MG/1
10 TABLET, FILM COATED, EXTENDED RELEASE ORAL EVERY 12 HOURS SCHEDULED
Status: DISPENSED | OUTPATIENT
Start: 2023-09-25 | End: 2023-10-02

## 2023-09-24 NOTE — PROGRESS NOTES
Formerly [facility]  NIMESH Parkinson APRN        Internal Medicine Progress Note    9/24/2023   08:03 CDT    Name:  Erick Luong  MRN:    [id number]     Acct:     [id number]   Room:  [address] [place] [address] Day: 0     Admit Date: 9/18/2023  6:30 PM  PCP: Del Shetty MD    Subjective:     C/C: weakness, fatigue    Interval History: Status:  improved. Resting in bed. No family at bedside. BS stable. Wound vac alarming. Pt asking how wound looks, explained we would change wound vac twice weekly and know updates then.  He denies pain or other issues at this time.     Review of Systems   Constitutional: Positive for malaise/fatigue. Negative for chills, decreased appetite, weight gain and weight loss.   HENT:  Negative for congestion, ear discharge, hoarse voice and tinnitus.    Eyes:  Negative for blurred vision, discharge, visual disturbance and visual halos.   Cardiovascular:  Negative for chest pain, claudication, dyspnea on exertion, irregular heartbeat, leg swelling, orthopnea and paroxysmal nocturnal dyspnea.   Respiratory:  Negative for cough, shortness of breath, sputum production and wheezing.    Endocrine: Negative for cold intolerance, heat intolerance and polyuria.   Hematologic/Lymphatic: Negative for adenopathy. Does not bruise/bleed easily.   Skin:  Positive for poor wound healing. Negative for dry skin, itching and suspicious lesions.   Musculoskeletal:  Positive for neck pain. Negative for arthritis, back pain, falls, joint pain, muscle weakness and myalgias.   Gastrointestinal:  Negative for abdominal pain, constipation, diarrhea, dysphagia and hematemesis.   Genitourinary:  Negative for bladder incontinence, dysuria and frequency.   Neurological:  Positive for weakness. Negative for aphonia, disturbances in coordination and dizziness.   Psychiatric/Behavioral:  Negative for altered mental status, depression, memory loss and substance abuse. The patient does not have insomnia and is not  "nervous/anxious.        Medications:     Allergies:   Allergies   Allergen Reactions    Cefepime Hives and Anaphylaxis    Bactrim [Sulfamethoxazole-Trimethoprim] Other (See Comments)     \"RENAL FAILURE\"    Vancomycin Itching    Zolpidem Unknown - High Severity     \"makes him crazy\"    Zolpidem Tartrate Unknown - Low Severity and Provider Review Needed    Metronidazole Rash       Current Meds:   Current Facility-Administered Medications:     acetaminophen (TYLENOL) tablet 650 mg, 650 mg, Oral, Q4H PRN **OR** acetaminophen (TYLENOL) suppository 650 mg, 650 mg, Rectal, Q4H PRN, Juan Manuel Page MD    aluminum-magnesium hydroxide-simethicone (MAALOX MAX) 400-400-40 MG/5ML suspension 30 mL, 30 mL, Oral, Q4H PRN, Juan Manuel Page MD    apixaban (ELIQUIS) tablet 5 mg, 5 mg, Oral, BID With Meals, Juan Manuel Page MD    busPIRone (BUSPAR) tablet 10 mg, 10 mg, Oral, Q12H PRN, Juan Manuel Page MD    calcitriol (ROCALTROL) capsule 0.5 mcg, 0.5 mcg, Oral, Daily, Juan Manuel Page MD    carvedilol (COREG) tablet 3.125 mg, 3.125 mg, Oral, BID With Meals, Juan Manuel Page MD    cefTRIAXone (ROCEPHIN) 1,000 mg in sodium chloride 0.9 % 100 mL IVPB, 1,000 mg, Intravenous, Q24H, Juan Manuel Page MD    cholecalciferol (VITAMIN D3) tablet 5,000 Units, 5,000 Units, Oral, Daily, Juan Manuel Page MD    dextrose (D50W) (25 g/50 mL) IV injection 25 g, 25 g, Intravenous, Q15 Min PRN, Juan Manuel Page MD    dextrose (GLUTOSE) oral gel 15 g, 15 g, Oral, Q15 Min PRN, Juan Manuel Page MD    docusate sodium (COLACE) capsule 200 mg, 200 mg, Oral, BID, Juan Manuel Page MD    donepezil (ARICEPT) tablet 10 mg, 10 mg, Oral, Daily, Juan Manuel Page MD    [START ON 9/25/2023] Dulaglutide solution pen-injector 4.5 mg, 4.5 mg, Subcutaneous, Weekly, Juan Manuel Page MD    ferric gluconate (FERRLECIT) 125 mg in sodium chloride 0.9 % 100 mL IVPB, 125 mg, Intravenous, Q24H, Ali, " MD Brian    glucagon (GLUCAGEN) injection 1 mg, 1 mg, Intramuscular, Q15 Min PRN, Juan Manuel Page MD    HYDROmorphone (DILAUDID) injection 1 mg, 1 mg, Intravenous, Q6H PRN, Juan Manuel Page MD    insulin detemir (LEVEMIR) injection 20 Units, 20 Units, Subcutaneous, Daily, Juan Manuel Page MD    Insulin Lispro (humaLOG) injection 2-9 Units, 2-9 Units, Subcutaneous, 4x Daily AC & at Bedtime, Juan Manuel Page MD    Lidocaine HCl 4 % gel gel 1 application , 1 application , Topical, PRN, Juan Manuel Page MD    magnesium hydroxide (MILK OF MAGNESIA) 400 MG/5ML suspension 15 mL, 15 mL, Oral, Daily PRN, Juan Manuel Page MD    melatonin tablet 6 mg, 6 mg, Oral, Nightly, Shruthi Isabel, APRN    metoclopramide (REGLAN) tablet 5 mg, 5 mg, Oral, TID AC, Juan Manuel Page MD    ondansetron (ZOFRAN) tablet 4 mg, 4 mg, Oral, Q6H PRN **OR** ondansetron (ZOFRAN) injection 4 mg, 4 mg, Intravenous, Q6H PRN, Juan Manuel Page MD    oxyCODONE (oxyCONTIN) 12 hr tablet 10 mg, 10 mg, Oral, Q12H, Juan Manuel Page MD    oxyCODONE (ROXICODONE) immediate release tablet 5 mg, 5 mg, Oral, Q4H PRN, Juan Manuel Page MD    pantoprazole (PROTONIX) EC tablet 40 mg, 40 mg, Oral, BID, Juan Manuel Page MD    polyethylene glycol (MIRALAX) packet 17 g, 17 g, Oral, Daily, Juan Manuel Page MD    pregabalin (LYRICA) capsule 100 mg, 100 mg, Oral, Daily, Juan Manuel Page MD    pregabalin (LYRICA) capsule 200 mg, 200 mg, Oral, Nightly, Juan Manuel Page MD    rosuvastatin (CRESTOR) tablet 40 mg, 40 mg, Oral, Nightly, Juan Manuel Page MD    saccharomyces boulardii (FLORASTOR) capsule 250 mg, 250 mg, Oral, BID, Juan Manuel Page MD    sennosides-docusate (PERICOLACE) 8.6-50 MG per tablet 1 tablet, 1 tablet, Oral, Nightly, Juan Manuel Page MD    sodium chloride 0.9 % flush 10 mL, 10 mL, Intravenous, Q12H, Juan Manuel Page MD    sodium chloride 0.9 %  flush 10 mL, 10 mL, Intravenous, PRN, Juan Manuel Page MD    sucralfate (CARAFATE) tablet 1 g, 1 g, Oral, TID AC, Juan Manuel Page MD    tamsulosin (FLOMAX) 24 hr capsule 0.4 mg, 0.4 mg, Oral, Daily, Juan Manuel Page MD    valsartan (DIOVAN) tablet 40 mg, 40 mg, Oral, Nightly, Juan Manuel Page MD    Data:     Code Status:    There are no questions and answers to display.       Family History   Problem Relation Age of Onset    Colon cancer Father     Heart disease Father     Colon cancer Sister     Colon polyps Sister     Alzheimer's disease Mother     Coronary artery disease Sister     Coronary artery disease Sister        Social History     Socioeconomic History    Marital status:    Tobacco Use    Smoking status: Former     Types: Cigarettes     Quit date:      Years since quittin.7    Smokeless tobacco: Never    Tobacco comments:     smoked in [place]   Vaping Use    Vaping Use: Never used   Substance and Sexual Activity    Alcohol use: No    Drug use: No    Sexual activity: Defer       Vitals:  T 98.7 P 90 R 18 Bp 132/74 Sp02 100% (room air)            I/O (24Hr):  No intake or output data in the 24 hours ending 23 0803    Labs and imaging:      Recent Results (from the past 12 hour(s))   POC Glucose Once    Collection Time: 23  8:35 PM    Specimen: Blood   Result Value Ref Range    Glucose 295 (H) 70 - 130 mg/dL   Basic Metabolic Panel    Collection Time: 23  6:27 AM    Specimen: Blood   Result Value Ref Range    Glucose 90 65 - 99 mg/dL    BUN 35 (H) 8 - 23 mg/dL    Creatinine 2.84 (H) 0.76 - 1.27 mg/dL    Sodium 139 136 - 145 mmol/L    Potassium 4.2 3.5 - 5.2 mmol/L    Chloride 103 98 - 107 mmol/L    CO2 26.0 22.0 - 29.0 mmol/L    Calcium 8.8 8.6 - 10.5 mg/dL    BUN/Creatinine Ratio 12.3 7.0 - 25.0    Anion Gap 10.0 5.0 - 15.0 mmol/L    eGFR 23.6 (L) >60.0 mL/min/1.73   POC Glucose Once    Collection Time: 23  7:31 AM    Specimen: Blood    Result Value Ref Range    Glucose 99 70 - 130 mg/dL                               Physical Examination:        Physical Exam  Vitals and nursing note reviewed.   Constitutional:       Appearance: Normal appearance. He is well-developed. He is obese.   HENT:      Head: Normocephalic and atraumatic.      Right Ear: External ear normal.      Left Ear: External ear normal.      Nose: Nose normal.      Mouth/Throat:      Mouth: Mucous membranes are moist.      Pharynx: Oropharynx is clear.   Eyes:      Pupils: Pupils are equal, round, and reactive to light.   Cardiovascular:      Rate and Rhythm: Normal rate. Rhythm irregular.      Heart sounds: Normal heart sounds.   Pulmonary:      Effort: Pulmonary effort is normal.      Breath sounds: Normal breath sounds.   Abdominal:      General: Bowel sounds are normal.      Palpations: Abdomen is soft.      Comments: obese   Musculoskeletal:         General: Normal range of motion.      Cervical back: Normal range of motion and neck supple.      Comments: Generalized weakness   Skin:     General: Skin is warm and dry.      Comments: Wound vac intact   Neurological:      Mental Status: He is alert and oriented to person, place, and time.      Deep Tendon Reflexes: Reflexes are normal and symmetric.   Psychiatric:         Behavior: Behavior normal.         Assessment:            * No active hospital problems. *    Past Medical History:   Diagnosis Date    Arthritis     Autonomic disease     CAD (coronary artery disease) 02/06/2017    Cervical radiculopathy 09/16/2021    Chronic constipation with acute exaccerbation 05/10/2021    Coronary artery disease     Degeneration of cervical intervertebral disc 08/11/2021    Diabetes mellitus     Diabetic foot ulcer 08/31/2020    Diabetic polyneuropathy associated with type 2 diabetes mellitus 01/18/2021    Elevated cholesterol     Gastroesophageal reflux disease 05/13/2019    Headache     HTN (hypertension), benign 05/03/2017     Hyperlipidemia     Hypertension     Mixed hyperlipidemia 02/07/2017    Multiple lung nodules 01/26/2020    5mm, 9 mm RLL identified 1/2020, not present 10/2019.    Myocardial infarction     Osteomyelitis 01/22/2020    Osteomyelitis of fifth toe of right foot 10/07/2019    Pancreatitis     Persistent insomnia 01/20/2020    Renal disorder     Sleep apnea 02/06/2017    Sleep apnea with use of continuous positive airway pressure (CPAP)     NON-COMPLIANT    Slow transit constipation 01/16/2019    Spinal stenosis in cervical region 09/16/2021    Vitamin D deficiency 03/02/2021        Plan:        Proteus wound infection s/p debridement  New onset atrial fibrillation  Acute on chronic diastolic CHF  CKD3  Multifactorial anemia  DM2 with hyperglycemia on long term insulin  BRITTNI  Chronic pain syndrome  Chronic neck pain with radiculopathy  Diabetic peripheral neuropathy  Hx CAD  GERD  Possible gastroparesis  Continue current treatment. Monitor counts. Increase activity. Labs Monday. Wound care per wound care team. Continue IV antibiotics as tolerated. Appreciate nephrology evaluation and treatment. Continue glycemic control efforts. The patient requires continued inpatient treatment due to the need for daily physician assessments related to atrial fibrillation, CKD and other comorbidities as well as ongoing wound care with wound vac changes and recurrent debridements.       Electronically signed by SUSAN Bynum on 9/24/2023 at 08:03 CDT     I have discussed the care of Erick Luong, including pertinent history and exam findings, with the nurse practitioner.    I have seen and examined the patient and the key elements of all parts of the encounter have been performed by me.  I agree with the assessment, plan and orders as documented by SUSAN Pryor, after I modified the exam findings and the plan of treatments and the final version is my approved version of the assessment.        Electronically signed by  Juan Manuel Page MD on 9/24/2023 at 21:46 CDT

## 2023-09-24 NOTE — PROGRESS NOTES
Nephrology ([facility]) Progress Note      Patient:  Erick Luong  YOB: 1955  Date of Service: 9/24/2023  MRN: [id number]   Acct: [id number]   Primary Care Physician: Del Shetty MD  Advance Directive:   There are no questions and answers to display.     Admit Date: 9/18/2023       Hospital Day: 0  Referring Provider: Juan Manuel Page MD      Patient personally seen and examined.  Complete chart including Consults, Notes, Operative Reports, Labs, Cardiology, and Radiology studies reviewed as able.    Chief complaint: Abnormal labs.    Subjective:  Erick Luong is a 68 y.o. male  whom we were consulted for chronic kidney disease stage IIIb.  He has stage IIIb chronic kidney disease baseline, follows me in the office as he has diabetic nephropathy.  He has history of insulin-dependent type 2 diabetes, hypertension, abdominal obesity, obstructive sleep apnea, diabetic foot ulcer and coronary artery disease.  He was accepted as a transfer from [facility].  [place] [facility] [place] and extensive debridement of left foot wound at plantar aspect.  Postoperative wound measuring 6 x 17 x 4 cm with exposed bone.  Surgical culture returned positive for Proteus Mirabills.  Patient needed long-term IV antibiotics.  He is now admitted for wound care, long-term IV antibiotics, chronic kidney disease and treatment of diastolic congestive heart failure.  Patient also has history of gastroparesis.       This afternoon patient is complaining of weakness, lack of energy and fatigue.      Allergies:  Cefepime, Bactrim [sulfamethoxazole-trimethoprim], Vancomycin, Zolpidem, Zolpidem tartrate, and Metronidazole    Home Meds:  Medications Prior to Admission   Medication Sig Dispense Refill Last Dose    amitriptyline (ELAVIL) 25 MG tablet Take 2 tablets by mouth Daily at bedtime. 60 tablet 1     amoxicillin-clavulanate (Augmentin) 500-125 MG per  tablet Take 1 tablet by mouth every 12 hours with meals for 7 days. 14 tablet 0     ascorbic acid (VITAMIN C) 1000 MG tablet Take 1 tablet by mouth Daily. 30 tablet 3     Aspirin 81 MG capsule Take 81 mg by mouth Daily.       bumetanide (BUMEX) 2 MG tablet Take 1 tablet by mouth Daily. Take 2 tablets in morning;1 tablet at night       busPIRone (BUSPAR) 10 MG tablet Take 1 tablet by mouth 2 (Two) Times a Day As Needed. 100 tablet 3     calcitriol (ROCALTROL) 0.5 MCG capsule Take 1 capsule by mouth Daily. 90 capsule 4     carvedilol (COREG) 6.25 MG tablet Take 1 tablet by mouth 2 (Two) Times a Day. 180 tablet 4     Cholecalciferol (D-5000) 125 MCG (5000 UT) tablet Take 1 tablet by mouth Daily Before Lunch. 30 tablet 2     cloNIDine (CATAPRES) 0.1 MG tablet Take 1 tablet by mouth Every 12 (Twelve) Hours. 60 tablet 2     Diclofenac Sodium (VOLTAREN) 1 % gel gel Apply 2 g topically to the appropriate area as directed 4 (Four) Times a Day As Needed. 300 g 11     donepezil (ARICEPT) 10 MG tablet Take 1 tablet by mouth Daily. 90 tablet 4     Dulaglutide (Trulicity) 4.5 MG/0.5ML solution pen-injector Inject 0.5 mL under the skin into the appropriate area as directed 1 (One) Time Per Week. 2 mL 11     empagliflozin (JARDIANCE) 25 MG tablet tablet Take 1 tablet by mouth Daily. 30 tablet 2     HYDROcodone-acetaminophen (NORCO) 7.5-325 MG per tablet Take 1 tablet by mouth 2 (Two) Times a Day As Needed. 40 tablet 0     Insulin Regular Human, Conc, (HumuLIN R U-500 KwikPen) 500 UNIT/ML solution pen-injector CONCENTRATED injection Inject 120 Units under the skin into the appropriate area as directed 2 (Two) Times a Day with breakfast and dinner. (Patient taking differently: Inject 120 Units under the skin into the appropriate area as directed 3 (Three) Times a Day Before Meals.) 18 mL 5     midodrine (PROAMATINE) 10 MG tablet Take 1 tablet by mouth 3 (Three) Times a Day. 270 tablet 4     ondansetron ODT (ZOFRAN-ODT) 8 MG  disintegrating tablet Place 1 tablet under tongue 3 times a day as needed. 25 tablet 1     pantoprazole (Protonix) 40 MG EC tablet Take 1 tablet by mouth 2 (Two) Times a Day. 180 tablet 4     polyethylene glycol (MIRALAX) 17 g packet Take 17 g by mouth Daily. Obtain OTC       pregabalin (LYRICA) 100 MG capsule Take 1 capsule by mouth Daily With Breakfast AND 2 capsules Every Night. 90 capsule 2     rosuvastatin (CRESTOR) 40 MG tablet Take 1 tablet by mouth Every Night. 90 tablet 3     sennosides-docusate (PERICOLACE) 8.6-50 MG per tablet Take 1 tablet by mouth Every Night. Obtain OTC       sennosides-docusate (PERICOLACE) 8.6-50 MG per tablet Take 1 tablet by mouth every night at bedtime. 30 tablet 2     sodium hypochlorite (DAKIN'S 1/4 STRENGTH) 0.125 % solution topical solution 0.125% Apply to affected area twice daily 473 mL 3     tamsulosin (FLOMAX) 0.4 MG capsule 24 hr capsule Take 1 capsule by mouth Daily. 90 capsule 1        Medicines:  Current Facility-Administered Medications   Medication Dose Route Frequency Provider Last Rate Last Admin    acetaminophen (TYLENOL) tablet 650 mg  650 mg Oral Q4H PRN Juan Manuel Page MD        Or    acetaminophen (TYLENOL) suppository 650 mg  650 mg Rectal Q4H PRN Juan Manuel Page MD        aluminum-magnesium hydroxide-simethicone (MAALOX MAX) 400-400-40 MG/5ML suspension 30 mL  30 mL Oral Q4H PRN Juan Manuel Page MD        apixaban (ELIQUIS) tablet 5 mg  5 mg Oral BID With Meals Juan Manuel Page MD        busPIRone (BUSPAR) tablet 10 mg  10 mg Oral Q12H PRN Juan Manuel Page MD        calcitriol (ROCALTROL) capsule 0.5 mcg  0.5 mcg Oral Daily Juan Manuel Page MD        carvedilol (COREG) tablet 3.125 mg  3.125 mg Oral BID With Meals Juan Manuel Page MD        cefTRIAXone (ROCEPHIN) 1,000 mg in sodium chloride 0.9 % 100 mL IVPB  1,000 mg Intravenous Q24H Juan Manuel Page MD        cholecalciferol (VITAMIN D3) tablet 5,000 Units   5,000 Units Oral Daily Juan Manuel Page MD        dextrose (D50W) (25 g/50 mL) IV injection 25 g  25 g Intravenous Q15 Min PRN Juan Manuel Page MD        dextrose (GLUTOSE) oral gel 15 g  15 g Oral Q15 Min PRN Juan Manuel Page MD        docusate sodium (COLACE) capsule 200 mg  200 mg Oral BID JuanM anuel Page MD        donepezil (ARICEPT) tablet 10 mg  10 mg Oral Daily Juan Manuel Page MD        [START ON 9/25/2023] Dulaglutide solution pen-injector 4.5 mg  4.5 mg Subcutaneous Weekly Juan Manuel Page MD        ferric gluconate (FERRLECIT) 125 mg in sodium chloride 0.9 % 100 mL IVPB  125 mg Intravenous Q24H Brian Lomas MD        glucagon (GLUCAGEN) injection 1 mg  1 mg Intramuscular Q15 Min PRN Juan Manuel Page MD        HYDROmorphone (DILAUDID) injection 1 mg  1 mg Intravenous Q6H PRN Juan Manuel Page MD        insulin detemir (LEVEMIR) injection 20 Units  20 Units Subcutaneous Daily Juan Manuel Page MD        Insulin Lispro (humaLOG) injection 2-9 Units  2-9 Units Subcutaneous 4x Daily AC & at Bedtime Juan Manuel Page MD        Lidocaine HCl 4 % gel gel 1 application   1 application  Topical PRN Juan Manuel Page MD        magnesium hydroxide (MILK OF MAGNESIA) 400 MG/5ML suspension 15 mL  15 mL Oral Daily PRN Juan Manuel Page MD        melatonin tablet 6 mg  6 mg Oral Nightly Shruthi Isabel APRN        metoclopramide (REGLAN) tablet 5 mg  5 mg Oral TID AC Juan Manuel Page MD        ondansetron (ZOFRAN) tablet 4 mg  4 mg Oral Q6H PRN Juan Manuel Page MD        Or    ondansetron (ZOFRAN) injection 4 mg  4 mg Intravenous Q6H PRN Juan Manuel Page MD        oxyCODONE (oxyCONTIN) 12 hr tablet 10 mg  10 mg Oral Q12H Juan Manuel Page MD        oxyCODONE (ROXICODONE) immediate release tablet 5 mg  5 mg Oral Q4H PRN Juan Manuel Page MD        pantoprazole (PROTONIX) EC tablet 40 mg  40 mg Oral BID Juan Manuel Page  MD Santana        polyethylene glycol (MIRALAX) packet 17 g  17 g Oral Daily Juan Manuel Page MD        pregabalin (LYRICA) capsule 100 mg  100 mg Oral Daily Juan Manuel Page MD        pregabalin (LYRICA) capsule 200 mg  200 mg Oral Nightly Juan Maunel Page MD        rosuvastatin (CRESTOR) tablet 40 mg  40 mg Oral Nightly Juan Manuel Page MD        saccharomyces boulardii (FLORASTOR) capsule 250 mg  250 mg Oral BID Juan Manuel Page MD        sennosides-docusate (PERICOLACE) 8.6-50 MG per tablet 1 tablet  1 tablet Oral Nightly Juan Manuel Page MD        sodium chloride 0.9 % flush 10 mL  10 mL Intravenous Q12H Juan Manuel Page MD        sodium chloride 0.9 % flush 10 mL  10 mL Intravenous PRN Juan Manuel Page MD        sucralfate (CARAFATE) tablet 1 g  1 g Oral TID AC Juan Manuel Page MD        tamsulosin (FLOMAX) 24 hr capsule 0.4 mg  0.4 mg Oral Daily Juan Manuel Page MD        valsartan (DIOVAN) tablet 40 mg  40 mg Oral Nightly Juan Manuel Page MD           Past Medical History:  Past Medical History:   Diagnosis Date    Arthritis     Autonomic disease     CAD (coronary artery disease) 02/06/2017    Cervical radiculopathy 09/16/2021    Chronic constipation with acute exaccerbation 05/10/2021    Coronary artery disease     Degeneration of cervical intervertebral disc 08/11/2021    Diabetes mellitus     Diabetic foot ulcer 08/31/2020    Diabetic polyneuropathy associated with type 2 diabetes mellitus 01/18/2021    Elevated cholesterol     Gastroesophageal reflux disease 05/13/2019    Headache     HTN (hypertension), benign 05/03/2017    Hyperlipidemia     Hypertension     Mixed hyperlipidemia 02/07/2017    Multiple lung nodules 01/26/2020    5mm, 9 mm RLL identified 1/2020, not present 10/2019.    Myocardial infarction     Osteomyelitis 01/22/2020    Osteomyelitis of fifth toe of right foot 10/07/2019    Pancreatitis     Persistent insomnia 01/20/2020     Renal disorder     Sleep apnea 02/06/2017    Sleep apnea with use of continuous positive airway pressure (CPAP)     NON-COMPLIANT    Slow transit constipation 01/16/2019    Spinal stenosis in cervical region 09/16/2021    Vitamin D deficiency 03/02/2021       Past Surgical History:  Past Surgical History:   Procedure Laterality Date    ABDOMINAL SURGERY      AMPUTATION FOOT / TOE Left 10/2021    5th digit     ANTERIOR CERVICAL DISCECTOMY W/ FUSION N/A 8/5/2022    Procedure: CERVICAL DISCECTOMY ANTERIOR WITH FUSION C5-6 with possible plating of C5-7 with neuromonitoring and 1 c-arm;  Surgeon: Karel Soliz MD;  Location:  [facility] OR;  Service: Neurosurgery;  Laterality: N/A;    APPENDECTOMY      BACK SURGERY      CARDIAC CATHETERIZATION Left 02/08/2021    Procedure: Left Heart Cath w poss intervention left anatomical snuff box acess;  Surgeon: Omkar Charles DO;  Location:  [facility] CATH INVASIVE LOCATION;  Service: Cardiology;  Laterality: Left;    CARDIAC SURGERY      CATARACT EXTRACTION      CERVICAL SPINE SURGERY      COLONOSCOPY N/A 01/31/2017    Normal exam repeat in 5 years    COLONOSCOPY N/A 02/11/2019    Mild acute inflammation    COLONOSCOPY W/ POLYPECTOMY  03/04/2014    Hyperplastic polyp    CORONARY ARTERY BYPASS GRAFT  10/2015    ENDOSCOPY  04/13/2011    Gastritis with hemorrhage    ENDOSCOPY N/A 05/05/2017    Normal exam    ENDOSCOPY N/A 02/11/2019    Gastritis    ENDOSCOPY N/A 09/01/2020    Non-erosive gastritis with hemorrhage    ENDOSCOPY N/A 02/10/2021    Esophagitis    FOOT SURGERY Left     INCISION AND DRAINAGE OF WOUND Left 09/2007    spider bite       Family History  Family History   Problem Relation Age of Onset    Colon cancer Father     Heart disease Father     Colon cancer Sister     Colon polyps Sister     Alzheimer's disease Mother     Coronary artery disease Sister     Coronary artery disease Sister        Social History  Social History     Socioeconomic History    Marital  status:    Tobacco Use    Smoking status: Former     Types: Cigarettes     Quit date:      Years since quittin.7    Smokeless tobacco: Never    Tobacco comments:     smoked in highschool   Vaping Use    Vaping Use: Never used   Substance and Sexual Activity    Alcohol use: No    Drug use: No    Sexual activity: Defer       Review of Systems:  History obtained from chart review and the patient  General ROS: No fever or chills  Respiratory ROS: No cough, shortness of breath, wheezing  Cardiovascular ROS: No chest pain or palpitations  Gastrointestinal ROS: No abdominal pain or melena  Genito-Urinary ROS: No dysuria or hematuria  Psych ROS: No anxiety and depression  14 point ROS reviewed with the patient and negative except as noted above and in the HPI unless unable to obtain.    Objective:  Blood pressure: 132/74 mmHg.  Heart rate is 90 bpm  O2 saturation 100%.    General: awake/alert   HEENT: Normocephalic atraumatic head  Neck: Supple with no JVD carotid bruits.  Chest:  clear to auscultation bilaterally without respiratory distress  CVS: regular rate and rhythm  Abdominal: soft, nontender, positive bowel sounds  Extremities:  Left foot diabetic ulcer at plantar aspect  Skin: warm and dry without rash      Labs:  Results from last 7 days   Lab Units 23  0603 23  0913 23  0517   WBC 10*3/mm3 10.41 7.45 8.69   HEMOGLOBIN g/dL 8.7* 8.9* 8.7*   HEMATOCRIT % 28.6* 29.4* 28.8*   PLATELETS 10*3/mm3 330 328 310         Results from last 7 days   Lab Units 23  0627 23  0603 23  0913 23  0517 23  0629   SODIUM mmol/L 139 139 137   < > 140   POTASSIUM mmol/L 4.2 4.3 4.8   < > 4.3   CHLORIDE mmol/L 103 101 101   < > 105   CO2 mmol/L 26.0 24.0 24.0   < > 23.0   BUN mg/dL 35* 32* 24*   < > 23   CREATININE mg/dL 2.84* 2.46* 2.12*   < > 1.87*   CALCIUM mg/dL 8.8 9.2 9.1   < > 9.2   EGFR mL/min/1.73 23.6* 28.0* 33.5*   < > 38.9*   BILIRUBIN mg/dL  --   --   --   --   0.2   ALK PHOS U/L  --   --   --   --  106   ALT (SGPT) U/L  --   --   --   --  9   AST (SGOT) U/L  --   --   --   --  12   GLUCOSE mg/dL 90 150* 273*   < > 112*    < > = values in this interval not displayed.       Radiology:   Imaging Results (Last 72 Hours)       ** No results found for the last 72 hours. **            Culture:  No results found for: BLOODCX, URINECX, WOUNDCX, MRSACX, RESPCX, STOOLCX      Assessment   1.  Acute kidney injury/stage I.  2..Acute tubular necrosis  3.  Stage IIIb chronic kidney disease baseline.  3.  Status post debridement of diabetic foot ulcer  4.  Chronic diastolic CHF.  5.  Proteus wound infection  6.  Insulin-dependent type 2 diabetes.  7.  History of gastroparesis.  8.  Secondary hyperparathyroidism.  9.  Anemia of CKD.  10.  Type II diabetic nephropathy.    Plan:  1.  Continue to monitor renal function.  2.  Continue calcitriol.  3.  Continue intravenous Ferrlecit   4.  Plan was discussed with the patient.    Brian Lomas MD  9/24/2023  16:34 CDT

## 2023-09-25 LAB
ANION GAP SERPL CALCULATED.3IONS-SCNC: 11 MMOL/L (ref 5–15)
BASOPHILS # BLD AUTO: 0.09 10*3/MM3 (ref 0–0.2)
BASOPHILS NFR BLD AUTO: 1.3 % (ref 0–1.5)
BUN SERPL-MCNC: 32 MG/DL (ref 8–23)
BUN/CREAT SERPL: 14.6 (ref 7–25)
CALCIUM SPEC-SCNC: 9.4 MG/DL (ref 8.6–10.5)
CHLORIDE SERPL-SCNC: 104 MMOL/L (ref 98–107)
CO2 SERPL-SCNC: 26 MMOL/L (ref 22–29)
CREAT SERPL-MCNC: 2.19 MG/DL (ref 0.76–1.27)
CRP SERPL-MCNC: 2.92 MG/DL (ref 0–0.5)
DEPRECATED RDW RBC AUTO: 50.4 FL (ref 37–54)
EGFRCR SERPLBLD CKD-EPI 2021: 32.2 ML/MIN/1.73
EOSINOPHIL # BLD AUTO: 0.8 10*3/MM3 (ref 0–0.4)
EOSINOPHIL NFR BLD AUTO: 11.9 % (ref 0.3–6.2)
ERYTHROCYTE [DISTWIDTH] IN BLOOD BY AUTOMATED COUNT: 15.6 % (ref 12.3–15.4)
ERYTHROCYTE [SEDIMENTATION RATE] IN BLOOD: 35 MM/HR (ref 0–20)
GLUCOSE BLDC GLUCOMTR-MCNC: 224 MG/DL (ref 70–130)
GLUCOSE BLDC GLUCOMTR-MCNC: 317 MG/DL (ref 70–130)
GLUCOSE BLDC GLUCOMTR-MCNC: 320 MG/DL (ref 70–130)
GLUCOSE BLDC GLUCOMTR-MCNC: 99 MG/DL (ref 70–130)
GLUCOSE SERPL-MCNC: 93 MG/DL (ref 65–99)
HCT VFR BLD AUTO: 30.3 % (ref 37.5–51)
HGB BLD-MCNC: 9.1 G/DL (ref 13–17.7)
IMM GRANULOCYTES # BLD AUTO: 0.01 10*3/MM3 (ref 0–0.05)
IMM GRANULOCYTES NFR BLD AUTO: 0.1 % (ref 0–0.5)
LYMPHOCYTES # BLD AUTO: 1.81 10*3/MM3 (ref 0.7–3.1)
LYMPHOCYTES NFR BLD AUTO: 27 % (ref 19.6–45.3)
MCH RBC QN AUTO: 26.6 PG (ref 26.6–33)
MCHC RBC AUTO-ENTMCNC: 30 G/DL (ref 31.5–35.7)
MCV RBC AUTO: 88.6 FL (ref 79–97)
MONOCYTES # BLD AUTO: 0.59 10*3/MM3 (ref 0.1–0.9)
MONOCYTES NFR BLD AUTO: 8.8 % (ref 5–12)
NEUTROPHILS NFR BLD AUTO: 3.4 10*3/MM3 (ref 1.7–7)
NEUTROPHILS NFR BLD AUTO: 50.9 % (ref 42.7–76)
NRBC BLD AUTO-RTO: 0 /100 WBC (ref 0–0.2)
PLATELET # BLD AUTO: 288 10*3/MM3 (ref 140–450)
PMV BLD AUTO: 11.9 FL (ref 6–12)
POTASSIUM SERPL-SCNC: 4.6 MMOL/L (ref 3.5–5.2)
RBC # BLD AUTO: 3.42 10*6/MM3 (ref 4.14–5.8)
SODIUM SERPL-SCNC: 141 MMOL/L (ref 136–145)
WBC NRBC COR # BLD: 6.7 10*3/MM3 (ref 3.4–10.8)

## 2023-09-25 PROCEDURE — 25010000002 NA FERRIC GLUC CPLX PER 12.5 MG: Performed by: INTERNAL MEDICINE

## 2023-09-25 PROCEDURE — 25010000002 CEFTRIAXONE PER 250 MG: Performed by: INTERNAL MEDICINE

## 2023-09-25 PROCEDURE — 85025 COMPLETE CBC W/AUTO DIFF WBC: CPT | Performed by: INTERNAL MEDICINE

## 2023-09-25 PROCEDURE — 63710000001 INSULIN DETEMIR PER 5 UNITS: Performed by: INTERNAL MEDICINE

## 2023-09-25 PROCEDURE — 63710000001 INSULIN LISPRO (HUMAN) PER 5 UNITS: Performed by: INTERNAL MEDICINE

## 2023-09-25 PROCEDURE — 86140 C-REACTIVE PROTEIN: CPT | Performed by: INTERNAL MEDICINE

## 2023-09-25 PROCEDURE — 97530 THERAPEUTIC ACTIVITIES: CPT

## 2023-09-25 PROCEDURE — 80048 BASIC METABOLIC PNL TOTAL CA: CPT | Performed by: INTERNAL MEDICINE

## 2023-09-25 PROCEDURE — 97110 THERAPEUTIC EXERCISES: CPT

## 2023-09-25 PROCEDURE — 82948 REAGENT STRIP/BLOOD GLUCOSE: CPT

## 2023-09-25 PROCEDURE — 85652 RBC SED RATE AUTOMATED: CPT | Performed by: INTERNAL MEDICINE

## 2023-09-25 NOTE — PROGRESS NOTES
[facility]  NIMESH Parkinson APRN        Internal Medicine Progress Note    9/25/2023   11:08 CDT    Name:  Erick Luong  MRN:    [id number]     Acct:     [id number]   Room:  [address] [place] [address] Day: 0     Admit Date: 9/18/2023  6:30 PM  PCP: Del Shetty MD    Subjective:     C/C: weakness, fatigue    Interval History: Status:  improved. Up to chair. No family at bedside. Afebrile. 02 sats stable on room air. Tolerated wound care with wound vac change this morning. Slight improvement in renal function. Counts otherwise stable.     Review of Systems   Constitutional: Positive for malaise/fatigue. Negative for chills, decreased appetite, weight gain and weight loss.   HENT:  Negative for congestion, ear discharge, hoarse voice and tinnitus.    Eyes:  Negative for blurred vision, discharge, visual disturbance and visual halos.   Cardiovascular:  Negative for chest pain, claudication, dyspnea on exertion, irregular heartbeat, leg swelling, orthopnea and paroxysmal nocturnal dyspnea.   Respiratory:  Negative for cough, shortness of breath, sputum production and wheezing.    Endocrine: Negative for cold intolerance, heat intolerance and polyuria.   Hematologic/Lymphatic: Negative for adenopathy. Does not bruise/bleed easily.   Skin:  Positive for poor wound healing. Negative for dry skin, itching and suspicious lesions.   Musculoskeletal:  Positive for neck pain. Negative for arthritis, back pain, falls, joint pain, muscle weakness and myalgias.   Gastrointestinal:  Negative for abdominal pain, constipation, diarrhea, dysphagia and hematemesis.   Genitourinary:  Negative for bladder incontinence, dysuria and frequency.   Neurological:  Positive for weakness. Negative for aphonia, disturbances in coordination and dizziness.   Psychiatric/Behavioral:  Negative for altered mental status, depression, memory loss and substance abuse. The patient does not have insomnia and is not nervous/anxious.   "      Medications:     Allergies:   Allergies   Allergen Reactions    Cefepime Hives and Anaphylaxis    Bactrim [Sulfamethoxazole-Trimethoprim] Other (See Comments)     \"RENAL FAILURE\"    Vancomycin Itching    Zolpidem Unknown - High Severity     \"makes him crazy\"    Zolpidem Tartrate Unknown - Low Severity and Provider Review Needed    Metronidazole Rash       Current Meds:   Current Facility-Administered Medications:     acetaminophen (TYLENOL) tablet 650 mg, 650 mg, Oral, Q4H PRN **OR** acetaminophen (TYLENOL) suppository 650 mg, 650 mg, Rectal, Q4H PRN, Juan Manuel Page MD    aluminum-magnesium hydroxide-simethicone (MAALOX MAX) 400-400-40 MG/5ML suspension 30 mL, 30 mL, Oral, Q4H PRN, Juan Manuel Page MD    apixaban (ELIQUIS) tablet 5 mg, 5 mg, Oral, BID With Meals, Juan Manuel Page MD    busPIRone (BUSPAR) tablet 10 mg, 10 mg, Oral, Q12H PRN, Juan Manuel Page MD    calcitriol (ROCALTROL) capsule 0.5 mcg, 0.5 mcg, Oral, Daily, Juan Manuel Page MD    carvedilol (COREG) tablet 3.125 mg, 3.125 mg, Oral, BID With Meals, Juan Manuel Page MD    cefTRIAXone (ROCEPHIN) 1,000 mg in sodium chloride 0.9 % 100 mL IVPB, 1,000 mg, Intravenous, Q24H, Juan Manuel Page MD    cholecalciferol (VITAMIN D3) tablet 5,000 Units, 5,000 Units, Oral, Daily, Juan Manuel Page MD    dextrose (D50W) (25 g/50 mL) IV injection 25 g, 25 g, Intravenous, Q15 Min PRN, Juan Manuel Page MD    dextrose (GLUTOSE) oral gel 15 g, 15 g, Oral, Q15 Min PRN, Juan Manuel Page MD    docusate sodium (COLACE) capsule 200 mg, 200 mg, Oral, BID, Juan Manuel Page MD    donepezil (ARICEPT) tablet 10 mg, 10 mg, Oral, Daily, Juan Manuel Page MD    Dulaglutide solution pen-injector 4.5 mg, 4.5 mg, Subcutaneous, Weekly, Juan Manuel Page MD    ferric gluconate (FERRLECIT) 125 mg in sodium chloride 0.9 % 100 mL IVPB, 125 mg, Intravenous, Q24H, Brian Lomas MD    glucagon (GLUCAGEN) injection " 1 mg, 1 mg, Intramuscular, Q15 Min PRN, Juan Manuel Page MD    HYDROmorphone (DILAUDID) injection 1 mg, 1 mg, Intravenous, Q6H PRN, Juan Manuel Page MD    insulin detemir (LEVEMIR) injection 20 Units, 20 Units, Subcutaneous, Daily, Juan Manuel Page MD    Insulin Lispro (humaLOG) injection 2-9 Units, 2-9 Units, Subcutaneous, 4x Daily AC & at Bedtime, Juan Manuel Page MD    Lidocaine HCl 4 % gel gel 1 application , 1 application , Topical, PRN, Juan Manuel Page MD    magnesium hydroxide (MILK OF MAGNESIA) 400 MG/5ML suspension 15 mL, 15 mL, Oral, Daily PRN, Juan Manuel Page MD    melatonin tablet 6 mg, 6 mg, Oral, Nightly, Shruthi Isabel, APRSHERRILL    metoclopramide (REGLAN) tablet 5 mg, 5 mg, Oral, TID AC, Juan Manuel Page MD    ondansetron (ZOFRAN) tablet 4 mg, 4 mg, Oral, Q6H PRN **OR** ondansetron (ZOFRAN) injection 4 mg, 4 mg, Intravenous, Q6H PRN, Juan Manuel Page MD    oxyCODONE (oxyCONTIN) 12 hr tablet 10 mg, 10 mg, Oral, Q12H, Juan Manuel Page MD    oxyCODONE (ROXICODONE) immediate release tablet 5 mg, 5 mg, Oral, Q4H PRN, Juan Manuel Page MD    pantoprazole (PROTONIX) EC tablet 40 mg, 40 mg, Oral, BID, Juan Manuel Page MD    polyethylene glycol (MIRALAX) packet 17 g, 17 g, Oral, Daily, Juan Manuel Page MD    pregabalin (LYRICA) capsule 100 mg, 100 mg, Oral, Daily, Juan Manuel Page MD    pregabalin (LYRICA) capsule 200 mg, 200 mg, Oral, Nightly, Juan Manuel Page MD    rosuvastatin (CRESTOR) tablet 40 mg, 40 mg, Oral, Nightly, Juan Manuel Page MD    saccharomyces boulardii (FLORASTOR) capsule 250 mg, 250 mg, Oral, BID, Juan Manuel Page MD    sennosides-docusate (PERICOLACE) 8.6-50 MG per tablet 1 tablet, 1 tablet, Oral, Nightly, Juan Manuel Page MD    sodium chloride 0.9 % flush 10 mL, 10 mL, Intravenous, Q12H, Juan Manuel Page MD    sodium chloride 0.9 % flush 10 mL, 10 mL, Intravenous, PRN, Camilo,  Juan Manuel Dillon MD    sucralfate (CARAFATE) tablet 1 g, 1 g, Oral, TID AC, Juan Manuel Page MD    tamsulosin (FLOMAX) 24 hr capsule 0.4 mg, 0.4 mg, Oral, Daily, Juan Manuel Page MD    valsartan (DIOVAN) tablet 40 mg, 40 mg, Oral, Nightly, Juan Manuel Page MD    Data:     Code Status:    There are no questions and answers to display.       Family History   Problem Relation Age of Onset    Colon cancer Father     Heart disease Father     Colon cancer Sister     Colon polyps Sister     Alzheimer's disease Mother     Coronary artery disease Sister     Coronary artery disease Sister        Social History     Socioeconomic History    Marital status:    Tobacco Use    Smoking status: Former     Types: Cigarettes     Quit date:      Years since quittin.7    Smokeless tobacco: Never    Tobacco comments:     smoked in [employer]   Vaping Use    Vaping Use: Never used   Substance and Sexual Activity    Alcohol use: No    Drug use: No    Sexual activity: Defer       Vitals:  T 97.5 P 64 R 14 Bp 128/95 Sp02 100% (room air)            I/O (24Hr):  No intake or output data in the 24 hours ending 23 1108    Labs and imaging:      Recent Results (from the past 12 hour(s))   Basic Metabolic Panel    Collection Time: 23  6:27 AM    Specimen: Blood   Result Value Ref Range    Glucose 93 65 - 99 mg/dL    BUN 32 (H) 8 - 23 mg/dL    Creatinine 2.19 (H) 0.76 - 1.27 mg/dL    Sodium 141 136 - 145 mmol/L    Potassium 4.6 3.5 - 5.2 mmol/L    Chloride 104 98 - 107 mmol/L    CO2 26.0 22.0 - 29.0 mmol/L    Calcium 9.4 8.6 - 10.5 mg/dL    BUN/Creatinine Ratio 14.6 7.0 - 25.0    Anion Gap 11.0 5.0 - 15.0 mmol/L    eGFR 32.2 (L) >60.0 mL/min/1.73   Sedimentation Rate    Collection Time: 23  6:27 AM    Specimen: Blood   Result Value Ref Range    Sed Rate 35 (H) 0 - 20 mm/hr   C-reactive Protein    Collection Time: 23  6:27 AM    Specimen: Blood   Result Value Ref Range    C-Reactive Protein  2.92 (H) 0.00 - 0.50 mg/dL   CBC Auto Differential    Collection Time: 09/25/23  6:27 AM    Specimen: Blood   Result Value Ref Range    WBC 6.70 3.40 - 10.80 10*3/mm3    RBC 3.42 (L) 4.14 - 5.80 10*6/mm3    Hemoglobin 9.1 (L) 13.0 - 17.7 g/dL    Hematocrit 30.3 (L) 37.5 - 51.0 %    MCV 88.6 79.0 - 97.0 fL    MCH 26.6 26.6 - 33.0 pg    MCHC 30.0 (L) 31.5 - 35.7 g/dL    RDW 15.6 (H) 12.3 - 15.4 %    RDW-SD 50.4 37.0 - 54.0 fl    MPV 11.9 6.0 - 12.0 fL    Platelets 288 140 - 450 10*3/mm3    Neutrophil % 50.9 42.7 - 76.0 %    Lymphocyte % 27.0 19.6 - 45.3 %    Monocyte % 8.8 5.0 - 12.0 %    Eosinophil % 11.9 (H) 0.3 - 6.2 %    Basophil % 1.3 0.0 - 1.5 %    Immature Grans % 0.1 0.0 - 0.5 %    Neutrophils, Absolute 3.40 1.70 - 7.00 10*3/mm3    Lymphocytes, Absolute 1.81 0.70 - 3.10 10*3/mm3    Monocytes, Absolute 0.59 0.10 - 0.90 10*3/mm3    Eosinophils, Absolute 0.80 (H) 0.00 - 0.40 10*3/mm3    Basophils, Absolute 0.09 0.00 - 0.20 10*3/mm3    Immature Grans, Absolute 0.01 0.00 - 0.05 10*3/mm3    nRBC 0.0 0.0 - 0.2 /100 WBC   POC Glucose Once    Collection Time: 09/25/23  7:06 AM    Specimen: Blood   Result Value Ref Range    Glucose 99 70 - 130 mg/dL                               Physical Examination:        Physical Exam  Vitals and nursing note reviewed.   Constitutional:       Appearance: Normal appearance. He is well-developed. He is obese.   HENT:      Head: Normocephalic and atraumatic.      Right Ear: External ear normal.      Left Ear: External ear normal.      Nose: Nose normal.      Mouth/Throat:      Mouth: Mucous membranes are moist.      Pharynx: Oropharynx is clear.   Eyes:      Pupils: Pupils are equal, round, and reactive to light.   Cardiovascular:      Rate and Rhythm: Normal rate. Rhythm irregular.      Heart sounds: Normal heart sounds.   Pulmonary:      Effort: Pulmonary effort is normal.      Breath sounds: Normal breath sounds.   Abdominal:      General: Bowel sounds are normal.      Palpations:  Abdomen is soft.      Comments: obese   Musculoskeletal:         General: Normal range of motion.      Cervical back: Normal range of motion and neck supple.      Comments: Generalized weakness   Skin:     General: Skin is warm and dry.      Comments: Wound vac intact   Neurological:      Mental Status: He is alert and oriented to person, place, and time.      Deep Tendon Reflexes: Reflexes are normal and symmetric.   Psychiatric:         Behavior: Behavior normal.         Assessment:            * No active hospital problems. *    Past Medical History:   Diagnosis Date    Arthritis     Autonomic disease     CAD (coronary artery disease) 02/06/2017    Cervical radiculopathy 09/16/2021    Chronic constipation with acute exaccerbation 05/10/2021    Coronary artery disease     Degeneration of cervical intervertebral disc 08/11/2021    Diabetes mellitus     Diabetic foot ulcer 08/31/2020    Diabetic polyneuropathy associated with type 2 diabetes mellitus 01/18/2021    Elevated cholesterol     Gastroesophageal reflux disease 05/13/2019    Headache     HTN (hypertension), benign 05/03/2017    Hyperlipidemia     Hypertension     Mixed hyperlipidemia 02/07/2017    Multiple lung nodules 01/26/2020    5mm, 9 mm RLL identified 1/2020, not present 10/2019.    Myocardial infarction     Osteomyelitis 01/22/2020    Osteomyelitis of fifth toe of right foot 10/07/2019    Pancreatitis     Persistent insomnia 01/20/2020    Renal disorder     Sleep apnea 02/06/2017    Sleep apnea with use of continuous positive airway pressure (CPAP)     NON-COMPLIANT    Slow transit constipation 01/16/2019    Spinal stenosis in cervical region 09/16/2021    Vitamin D deficiency 03/02/2021        Plan:        Proteus wound infection s/p debridement  New onset atrial fibrillation  Acute on chronic diastolic CHF  CKD3  Multifactorial anemia  DM2 with hyperglycemia on long term insulin  BRITTNI  Chronic pain syndrome  Chronic neck pain with  radiculopathy  Diabetic peripheral neuropathy  Hx CAD  GERD  Possible gastroparesis  Continue current treatment. Monitor counts. Increase activity. Labs Thursday. Wound care per wound care team. Continue IV antibiotics as tolerated. Appreciate nephrology evaluation and treatment. Continue glycemic control efforts. Maintain patient safety. Continue pain control efforts.       Electronically signed by SUSAN Muñoz on 9/25/2023 at 11:08 CDT     I have discussed the care of Erick Luong, including pertinent history and exam findings, with the nurse practitioner.    I have seen and examined the patient and the key elements of all parts of the encounter have been performed by me.  I agree with the assessment, plan and orders as documented by SUSAN Haney, after I modified the exam findings and the plan of treatments and the final version is my approved version of the assessment.        Electronically signed by Juan Manuel Page MD on 9/25/2023 at 21:18 CDT

## 2023-09-25 NOTE — PROGRESS NOTES
Nephrology ([facility]) Progress Note      Patient:  Erick Luong  YOB: 1955  Date of Service: 9/25/2023  MRN: [id number]   Acct: [id number]   Primary Care Physician: Del Shetty MD  Advance Directive:   There are no questions and answers to display.     Admit Date: 9/18/2023       Hospital Day: 0  Referring Provider: Juan Manuel Page MD      Patient personally seen and examined.  Complete chart including Consults, Notes, Operative Reports, Labs, Cardiology, and Radiology studies reviewed as able.        Subjective:  Erick Luong is a 68 y.o. male for whom we were consulted for evaluation and treatment of chronic kidney disease stage IIIb.  He has stage IIIb chronic kidney disease baseline and follows with Dr. Lomas in the office as he has diabetic nephropathy.  He has a history of insulin-dependent type 2 diabetes, hypertension, abdominal obesity, obstructive sleep apnea, diabetic foot ulcer and coronary artery disease.  He was accepted as a transfer from [facility].  [place] [facility] [place] and extensive debridement of left foot wound at plantar aspect.  Postoperative wound measuring 6 x 17 x 4 cm with exposed bone.  Surgical culture returned positive for Proteus Mirabills.  Patient needed long-term IV antibiotics.  He is now admitted for wound care, long-term IV antibiotics, chronic kidney disease and treatment of diastolic congestive heart failure.  Patient also has history of gastroparesis.      Today, no overnight events.  Feels edema is much improved.  Still with some weakness.  Eating [employer] fries and chicken sandwich.    Temp- 97.5  Pulse- 64  Resp- 14  BP- 128/95  O2%- 100      Allergies:  Cefepime, Bactrim [sulfamethoxazole-trimethoprim], Vancomycin, Zolpidem, Zolpidem tartrate, and Metronidazole    Home Meds:  Medications Prior to Admission   Medication Sig Dispense Refill Last Dose    amitriptyline (ELAVIL)  25 MG tablet Take 2 tablets by mouth Daily at bedtime. 60 tablet 1     amoxicillin-clavulanate (Augmentin) 500-125 MG per tablet Take 1 tablet by mouth every 12 hours with meals for 7 days. 14 tablet 0     ascorbic acid (VITAMIN C) 1000 MG tablet Take 1 tablet by mouth Daily. 30 tablet 3     Aspirin 81 MG capsule Take 81 mg by mouth Daily.       bumetanide (BUMEX) 2 MG tablet Take 1 tablet by mouth Daily. Take 2 tablets in morning;1 tablet at night       busPIRone (BUSPAR) 10 MG tablet Take 1 tablet by mouth 2 (Two) Times a Day As Needed. 100 tablet 3     calcitriol (ROCALTROL) 0.5 MCG capsule Take 1 capsule by mouth Daily. 90 capsule 4     carvedilol (COREG) 6.25 MG tablet Take 1 tablet by mouth 2 (Two) Times a Day. 180 tablet 4     Cholecalciferol (D-5000) 125 MCG (5000 UT) tablet Take 1 tablet by mouth Daily Before Lunch. 30 tablet 2     cloNIDine (CATAPRES) 0.1 MG tablet Take 1 tablet by mouth Every 12 (Twelve) Hours. 60 tablet 2     Diclofenac Sodium (VOLTAREN) 1 % gel gel Apply 2 g topically to the appropriate area as directed 4 (Four) Times a Day As Needed. 300 g 11     donepezil (ARICEPT) 10 MG tablet Take 1 tablet by mouth Daily. 90 tablet 4     Dulaglutide (Trulicity) 4.5 MG/0.5ML solution pen-injector Inject 0.5 mL under the skin into the appropriate area as directed 1 (One) Time Per Week. 2 mL 11     empagliflozin (JARDIANCE) 25 MG tablet tablet Take 1 tablet by mouth Daily. 30 tablet 2     HYDROcodone-acetaminophen (NORCO) 7.5-325 MG per tablet Take 1 tablet by mouth 2 (Two) Times a Day As Needed. 40 tablet 0     Insulin Regular Human, Conc, (HumuLIN R U-500 KwikPen) 500 UNIT/ML solution pen-injector CONCENTRATED injection Inject 120 Units under the skin into the appropriate area as directed 2 (Two) Times a Day with breakfast and dinner. (Patient taking differently: Inject 120 Units under the skin into the appropriate area as directed 3 (Three) Times a Day Before Meals.) 18 mL 5     midodrine  (PROAMATINE) 10 MG tablet Take 1 tablet by mouth 3 (Three) Times a Day. 270 tablet 4     ondansetron ODT (ZOFRAN-ODT) 8 MG disintegrating tablet Place 1 tablet under tongue 3 times a day as needed. 25 tablet 1     pantoprazole (Protonix) 40 MG EC tablet Take 1 tablet by mouth 2 (Two) Times a Day. 180 tablet 4     polyethylene glycol (MIRALAX) 17 g packet Take 17 g by mouth Daily. Obtain OTC       pregabalin (LYRICA) 100 MG capsule Take 1 capsule by mouth Daily With Breakfast AND 2 capsules Every Night. 90 capsule 2     rosuvastatin (CRESTOR) 40 MG tablet Take 1 tablet by mouth Every Night. 90 tablet 3     sennosides-docusate (PERICOLACE) 8.6-50 MG per tablet Take 1 tablet by mouth Every Night. Obtain OTC       sennosides-docusate (PERICOLACE) 8.6-50 MG per tablet Take 1 tablet by mouth every night at bedtime. 30 tablet 2     sodium hypochlorite (DAKIN'S 1/4 STRENGTH) 0.125 % solution topical solution 0.125% Apply to affected area twice daily 473 mL 3     tamsulosin (FLOMAX) 0.4 MG capsule 24 hr capsule Take 1 capsule by mouth Daily. 90 capsule 1        Medicines:  Current Facility-Administered Medications   Medication Dose Route Frequency Provider Last Rate Last Admin    acetaminophen (TYLENOL) tablet 650 mg  650 mg Oral Q4H PRN Juan Manuel Page MD        Or    acetaminophen (TYLENOL) suppository 650 mg  650 mg Rectal Q4H PRN Juan Manuel Page MD        aluminum-magnesium hydroxide-simethicone (MAALOX MAX) 400-400-40 MG/5ML suspension 30 mL  30 mL Oral Q4H PRN Juan Manuel Page MD        apixaban (ELIQUIS) tablet 5 mg  5 mg Oral BID With Meals Juan Manuel Page MD        busPIRone (BUSPAR) tablet 10 mg  10 mg Oral Q12H PRN Juan Manuel Page MD        calcitriol (ROCALTROL) capsule 0.5 mcg  0.5 mcg Oral Daily Juan Manuel Page MD        carvedilol (COREG) tablet 3.125 mg  3.125 mg Oral BID With Meals Juan Manuel Page MD        cefTRIAXone (ROCEPHIN) 1,000 mg in sodium chloride 0.9  % 100 mL IVPB  1,000 mg Intravenous Q24H Juan Manuel Page MD        cholecalciferol (VITAMIN D3) tablet 5,000 Units  5,000 Units Oral Daily Juan Manuel Page MD        dextrose (D50W) (25 g/50 mL) IV injection 25 g  25 g Intravenous Q15 Min PRN Juan Manuel Page MD        dextrose (GLUTOSE) oral gel 15 g  15 g Oral Q15 Min PRN Juan Manuel Page MD        docusate sodium (COLACE) capsule 200 mg  200 mg Oral BID Juan Manuel Page MD        donepezil (ARICEPT) tablet 10 mg  10 mg Oral Daily Juan Manuel Page MD        Dulaglutide solution pen-injector 4.5 mg  4.5 mg Subcutaneous Weekly Juan Manuel Page MD        ferric gluconate (FERRLECIT) 125 mg in sodium chloride 0.9 % 100 mL IVPB  125 mg Intravenous Q24H Brian Lomas MD        glucagon (GLUCAGEN) injection 1 mg  1 mg Intramuscular Q15 Min PRN Juan Manuel Page MD        HYDROmorphone (DILAUDID) injection 1 mg  1 mg Intravenous Q6H PRN Juan Manuel Page MD        insulin detemir (LEVEMIR) injection 20 Units  20 Units Subcutaneous Daily Juan Manuel Page MD        Insulin Lispro (humaLOG) injection 2-9 Units  2-9 Units Subcutaneous 4x Daily AC & at Bedtime Juan Manuel Page MD        Lidocaine HCl 4 % gel gel 1 application   1 application  Topical PRN Juan Manuel Page MD        magnesium hydroxide (MILK OF MAGNESIA) 400 MG/5ML suspension 15 mL  15 mL Oral Daily PRN Juan Manuel Page MD        melatonin tablet 6 mg  6 mg Oral Nightly Shruthi Isabel APRN        metoclopramide (REGLAN) tablet 5 mg  5 mg Oral TID AC Juan Manuel Page MD        ondansetron (ZOFRAN) tablet 4 mg  4 mg Oral Q6H PRN Juan Manuel Page MD        Or    ondansetron (ZOFRAN) injection 4 mg  4 mg Intravenous Q6H PRN Juan Manuel Page MD        oxyCODONE (oxyCONTIN) 12 hr tablet 10 mg  10 mg Oral Q12H Juan Manuel Page MD        oxyCODONE (ROXICODONE) immediate release tablet 5 mg  5 mg Oral Q4H PRN  Juan Manuel Page MD        pantoprazole (PROTONIX) EC tablet 40 mg  40 mg Oral BID Juan Manuel Page MD        polyethylene glycol (MIRALAX) packet 17 g  17 g Oral Daily Juan Manuel Page MD        pregabalin (LYRICA) capsule 100 mg  100 mg Oral Daily Juan Manuel Page MD        pregabalin (LYRICA) capsule 200 mg  200 mg Oral Nightly Juan Manuel Page MD        rosuvastatin (CRESTOR) tablet 40 mg  40 mg Oral Nightly Juan Manuel Page MD        saccharomyces boulardii (FLORASTOR) capsule 250 mg  250 mg Oral BID Juan Manuel Page MD        sennosides-docusate (PERICOLACE) 8.6-50 MG per tablet 1 tablet  1 tablet Oral Nightly Juan Manuel Page MD        sodium chloride 0.9 % flush 10 mL  10 mL Intravenous Q12H Juan Manuel Page MD        sodium chloride 0.9 % flush 10 mL  10 mL Intravenous PRN Juan Manuel Page MD        sucralfate (CARAFATE) tablet 1 g  1 g Oral TID AC Juan Manuel Page MD        tamsulosin (FLOMAX) 24 hr capsule 0.4 mg  0.4 mg Oral Daily Juan Manuel Page MD        valsartan (DIOVAN) tablet 40 mg  40 mg Oral Nightly Juan Manuel Page MD           Past Medical History:  Past Medical History:   Diagnosis Date    Arthritis     Autonomic disease     CAD (coronary artery disease) 02/06/2017    Cervical radiculopathy 09/16/2021    Chronic constipation with acute exaccerbation 05/10/2021    Coronary artery disease     Degeneration of cervical intervertebral disc 08/11/2021    Diabetes mellitus     Diabetic foot ulcer 08/31/2020    Diabetic polyneuropathy associated with type 2 diabetes mellitus 01/18/2021    Elevated cholesterol     Gastroesophageal reflux disease 05/13/2019    Headache     HTN (hypertension), benign 05/03/2017    Hyperlipidemia     Hypertension     Mixed hyperlipidemia 02/07/2017    Multiple lung nodules 01/26/2020    5mm, 9 mm RLL identified 1/2020, not present 10/2019.    Myocardial infarction     Osteomyelitis 01/22/2020     Osteomyelitis of fifth toe of right foot 10/07/2019    Pancreatitis     Persistent insomnia 01/20/2020    Renal disorder     Sleep apnea 02/06/2017    Sleep apnea with use of continuous positive airway pressure (CPAP)     NON-COMPLIANT    Slow transit constipation 01/16/2019    Spinal stenosis in cervical region 09/16/2021    Vitamin D deficiency 03/02/2021       Past Surgical History:  Past Surgical History:   Procedure Laterality Date    ABDOMINAL SURGERY      AMPUTATION FOOT / TOE Left 10/2021    5th digit     ANTERIOR CERVICAL DISCECTOMY W/ FUSION N/A 8/5/2022    Procedure: CERVICAL DISCECTOMY ANTERIOR WITH FUSION C5-6 with possible plating of C5-7 with neuromonitoring and 1 c-arm;  Surgeon: Karel Soliz MD;  Location:  [facility] OR;  Service: Neurosurgery;  Laterality: N/A;    APPENDECTOMY      BACK SURGERY      CARDIAC CATHETERIZATION Left 02/08/2021    Procedure: Left Heart Cath w poss intervention left anatomical snuff box acess;  Surgeon: Omkar Charles DO;  Location:  [facility] CATH INVASIVE LOCATION;  Service: Cardiology;  Laterality: Left;    CARDIAC SURGERY      CATARACT EXTRACTION      CERVICAL SPINE SURGERY      COLONOSCOPY N/A 01/31/2017    Normal exam repeat in 5 years    COLONOSCOPY N/A 02/11/2019    Mild acute inflammation    COLONOSCOPY W/ POLYPECTOMY  03/04/2014    Hyperplastic polyp    CORONARY ARTERY BYPASS GRAFT  10/2015    ENDOSCOPY  04/13/2011    Gastritis with hemorrhage    ENDOSCOPY N/A 05/05/2017    Normal exam    ENDOSCOPY N/A 02/11/2019    Gastritis    ENDOSCOPY N/A 09/01/2020    Non-erosive gastritis with hemorrhage    ENDOSCOPY N/A 02/10/2021    Esophagitis    FOOT SURGERY Left     INCISION AND DRAINAGE OF WOUND Left 09/2007    spider bite       Family History  Family History   Problem Relation Age of Onset    Colon cancer Father     Heart disease Father     Colon cancer Sister     Colon polyps Sister     Alzheimer's disease Mother     Coronary artery disease Sister      Coronary artery disease Sister        Social History  Social History     Socioeconomic History    Marital status:    Tobacco Use    Smoking status: Former     Types: Cigarettes     Quit date:      Years since quittin.7    Smokeless tobacco: Never    Tobacco comments:     smoked in highschool   Vaping Use    Vaping Use: Never used   Substance and Sexual Activity    Alcohol use: No    Drug use: No    Sexual activity: Defer       Review of Systems:  History obtained from chart review and the patient  General ROS: No fever or chills  Respiratory ROS: No cough, shortness of breath, wheezing  Cardiovascular ROS: No chest pain or palpitations  Gastrointestinal ROS: No abdominal pain or melena  Genito-Urinary ROS: No dysuria or hematuria  Psych ROS: No anxiety and depression  14 point ROS reviewed with the patient and negative except as noted above and in the HPI unless unable to obtain.    Objective:  Patient Vitals for the past 24 hrs:   Weight   23 0300 (!) 139 kg (306 lb 1.6 oz)     No intake or output data in the 24 hours ending 23 1827  General: awake/alert   Chest:  clear to auscultation bilaterally without respiratory distress  CVS: regular rate and rhythm  Abdominal: soft, nontender, positive bowel sounds  Extremities: lle edema  Skin: lle wound vac, warm/dry      Labs:  Results from last 7 days   Lab Units 23  0627 23  0603 23  0913   WBC 10*3/mm3 6.70 10.41 7.45   HEMOGLOBIN g/dL 9.1* 8.7* 8.9*   HEMATOCRIT % 30.3* 28.6* 29.4*   PLATELETS 10*3/mm3 288 330 328         Results from last 7 days   Lab Units 23  0627 23  0627 23  0603 23  0517 23  0629   SODIUM mmol/L 141 139 139   < > 140   POTASSIUM mmol/L 4.6 4.2 4.3   < > 4.3   CHLORIDE mmol/L 104 103 101   < > 105   CO2 mmol/L 26.0 26.0 24.0   < > 23.0   BUN mg/dL 32* 35* 32*   < > 23   CREATININE mg/dL 2.19* 2.84* 2.46*   < > 1.87*   CALCIUM mg/dL 9.4 8.8 9.2   < > 9.2   EGFR mL/min/1.73  32.2* 23.6* 28.0*   < > 38.9*   BILIRUBIN mg/dL  --   --   --   --  0.2   ALK PHOS U/L  --   --   --   --  106   ALT (SGPT) U/L  --   --   --   --  9   AST (SGOT) U/L  --   --   --   --  12   GLUCOSE mg/dL 93 90 150*   < > 112*    < > = values in this interval not displayed.       Radiology:   Imaging Results (Last 72 Hours)       ** No results found for the last 72 hours. **            Culture:  No results found for: BLOODCX, URINECX, WOUNDCX, MRSACX, RESPCX, STOOLCX      Assessment   Acute kidney injury/ATN  Chronic kidney disease stage IIIb  Diabetes type 2 with diabetic nephropathy  Diabetic foot ulcer with surgical debridement and wound VAC  Chronic diastolic congestive heart failure  Wound infection with Proteus  Gastroparesis  Secondary hyperparathyroidism  Anemia and chronic kidney disease    Plan:  Discussed with patient, nursing, family  Work-up reviewed today  Monitor labs  Calcitriol  Continue to monitor renal function has improved today with adjustment in medication      Willy Arteaga MD  9/25/2023  18:27 CDT

## 2023-09-26 LAB
GLUCOSE BLDC GLUCOMTR-MCNC: 145 MG/DL (ref 70–130)
GLUCOSE BLDC GLUCOMTR-MCNC: 165 MG/DL (ref 70–130)
GLUCOSE BLDC GLUCOMTR-MCNC: 242 MG/DL (ref 70–130)
GLUCOSE BLDC GLUCOMTR-MCNC: 262 MG/DL (ref 70–130)
TROPONIN T SERPL HS-MCNC: 56 NG/L

## 2023-09-26 PROCEDURE — 92526 ORAL FUNCTION THERAPY: CPT

## 2023-09-26 PROCEDURE — 63710000001 INSULIN DETEMIR PER 5 UNITS: Performed by: INTERNAL MEDICINE

## 2023-09-26 PROCEDURE — 63710000001 INSULIN LISPRO (HUMAN) PER 5 UNITS: Performed by: INTERNAL MEDICINE

## 2023-09-26 PROCEDURE — 82948 REAGENT STRIP/BLOOD GLUCOSE: CPT

## 2023-09-26 PROCEDURE — 92507 TX SP LANG VOICE COMM INDIV: CPT

## 2023-09-26 PROCEDURE — 97110 THERAPEUTIC EXERCISES: CPT

## 2023-09-26 PROCEDURE — 25010000002 ONDANSETRON PER 1 MG: Performed by: INTERNAL MEDICINE

## 2023-09-26 PROCEDURE — 25010000002 CEFTRIAXONE PER 250 MG: Performed by: INTERNAL MEDICINE

## 2023-09-26 PROCEDURE — 84484 ASSAY OF TROPONIN QUANT: CPT | Performed by: NURSE PRACTITIONER

## 2023-09-26 PROCEDURE — 97530 THERAPEUTIC ACTIVITIES: CPT

## 2023-09-26 PROCEDURE — 93005 ELECTROCARDIOGRAM TRACING: CPT | Performed by: INTERNAL MEDICINE

## 2023-09-26 PROCEDURE — 93010 ELECTROCARDIOGRAM REPORT: CPT | Performed by: INTERNAL MEDICINE

## 2023-09-26 NOTE — PROGRESS NOTES
Adult Nutrition  Assessment/PES    Patient Name:  Erick Luong  YOB: 1955  MRN: [id number]  Admit Date:  9/18/2023    Assessment Date:  9/26/2023     Reason for Assessment       Row Name 09/26/23 1611          Reason for Assessment    Reason For Assessment follow-up protocol     Diagnosis cardiac disease;infection/sepsis     Identified At Risk by Screening Criteria large or nonhealing wound, burn or pressure injury                    Nutrition/Diet History       Row Name 09/26/23 1611          Nutrition/Diet History    Typical Intake (Food/Fluid/EN/PN) Pt reports good appetite. Receiving daily menu selections. Eating mostly sandwiches.                    Labs/Tests/Procedures/Meds       Row Name 09/26/23 1611          Labs/Procedures/Meds    Lab Results Reviewed reviewed        Diagnostic Tests/Procedures    Diagnostic Test/Procedure Reviewed reviewed        Medications    Pertinent Medications Reviewed reviewed     Pertinent Medications Comments See MAR                    Physical Findings       Row Name 09/26/23 1612          Physical Findings    Overall Physical Appearance Room air, BLE edema, BM 9/25.                    Estimated/Assessed Needs - Anthropometrics       Row Name 09/26/23 1612          Anthropometrics    Weight for Calculation 139 kg (306 lb 1.6 oz)        Estimated/Assessed Needs    Additional Documentation Fluid Requirements (Group);KCAL/KG (Group);Protein Requirements (Group)        KCAL/KG    KCAL/KG 14 Kcal/Kg (kcal)     14 Kcal/Kg (kcal) 1943.844        Protein Requirements    Weight Used For Protein Calculations 80.7 kg (178 lb)  IBW     Est Protein Requirement Amount (gms/kg) 1.5 gm protein     Estimated Protein Requirements (gms/day) 121.11        Fluid Requirements    Fluid Requirements (mL/day) 2000     RDA Method (mL) 2000                    Nutrition Prescription Ordered       Row Name 09/26/23 1612          Nutrition Prescription PO    Current PO Diet Regular      Fluid Consistency Thin     Common Modifiers Cardiac;Consistent Carbohydrate                    Evaluation of Received Nutrient/Fluid Intake       Row Name 09/26/23 1612          Nutrient/Fluid Evaluation    Number of Days Evaluated 3 days        Fluid Intake Evaluation    Oral Fluid (mL) 1560        PO Evaluation    Number of Days PO Intake Evaluated 3 days     Number of Meals 7     % PO Intake 93; 0% x 1 meal during 72-hr                       Problem/Interventions:   Problem 1       Row Name 09/26/23 1613          Nutrition Diagnoses Problem 1    Problem 1 Predicted Suboptimal Intake     Etiology (related to) Medical Diagnosis     Gastrointestinal Gastroparesis     Signs/Symptoms (evidenced by) Report of Mnimal PO Intake;Report/Observation     Reported/Observed By Patient     Reported GI Symptoms Nausea and vomiting;Other (comment)  without vomiting; early satiety     Resolved? Yes                    Problem 2       Row Name 09/26/23 1613          Nutrition Diagnoses Problem 2    Problem 2 Nutrition Appropriate for Condition at this Time                        Intervention Goal       Row Name 09/26/23 1613          Intervention Goal    General Meet nutritional needs for age/condition;Disease management/therapy     PO Tolerate PO;Meet estimated needs     Weight No significant weight loss                    Nutrition Intervention       Row Name 09/26/23 1613          Nutrition Intervention    RD/Tech Action Care plan reviewd;Follow Tx progress                    Nutrition Prescription       Row Name 09/26/23 1613          Nutrition Prescription PO    PO Prescription Other (comment)  continue same protocol                    Education/Evaluation       Row Name 09/26/23 1613          Education    Education No discharge needs identified at this time        Monitor/Evaluation    Monitor Per protocol                     Electronically signed by:  Jie Green RDN, DEEPA  09/26/23 16:13 CDT

## 2023-09-26 NOTE — PROGRESS NOTES
[facility]  NIMESH Parkinson APRN        Internal Medicine Progress Note    9/26/2023   11:10 CDT    Name:  Erick Luong  MRN:    [id number]     Acct:     [id number]   Room:  [address] [place] [address] Day: 0     Admit Date: 9/18/2023  6:30 PM  PCP: Del Shetty MD    Subjective:     C/C: weakness, fatigue    Interval History: Status:  improved. Up to chair. No family at bedside. Afebrile. 02 sats stable on room air. Woke from sleep. Blood sugars elevated, but stable. Still with intermittent pain.     Review of Systems   Constitutional: Positive for malaise/fatigue. Negative for chills, decreased appetite, weight gain and weight loss.   HENT:  Negative for congestion, ear discharge, hoarse voice and tinnitus.    Eyes:  Negative for blurred vision, discharge, visual disturbance and visual halos.   Cardiovascular:  Negative for chest pain, claudication, dyspnea on exertion, irregular heartbeat, leg swelling, orthopnea and paroxysmal nocturnal dyspnea.   Respiratory:  Negative for cough, shortness of breath, sputum production and wheezing.    Endocrine: Negative for cold intolerance, heat intolerance and polyuria.   Hematologic/Lymphatic: Negative for adenopathy. Does not bruise/bleed easily.   Skin:  Positive for poor wound healing. Negative for dry skin, itching and suspicious lesions.   Musculoskeletal:  Positive for neck pain. Negative for arthritis, back pain, falls, joint pain, muscle weakness and myalgias.   Gastrointestinal:  Negative for abdominal pain, constipation, diarrhea, dysphagia and hematemesis.   Genitourinary:  Negative for bladder incontinence, dysuria and frequency.   Neurological:  Positive for weakness. Negative for aphonia, disturbances in coordination and dizziness.   Psychiatric/Behavioral:  Negative for altered mental status, depression, memory loss and substance abuse. The patient does not have insomnia and is not nervous/anxious.        Medications:     Allergies:  "  Allergies   Allergen Reactions    Cefepime Hives and Anaphylaxis    Bactrim [Sulfamethoxazole-Trimethoprim] Other (See Comments)     \"RENAL FAILURE\"    Vancomycin Itching    Zolpidem Unknown - High Severity     \"makes him crazy\"    Zolpidem Tartrate Unknown - Low Severity and Provider Review Needed    Metronidazole Rash       Current Meds:   Current Facility-Administered Medications:     acetaminophen (TYLENOL) tablet 650 mg, 650 mg, Oral, Q4H PRN **OR** acetaminophen (TYLENOL) suppository 650 mg, 650 mg, Rectal, Q4H PRN, Juan Manuel Page MD    aluminum-magnesium hydroxide-simethicone (MAALOX MAX) 400-400-40 MG/5ML suspension 30 mL, 30 mL, Oral, Q4H PRN, Juan Manuel Page MD    apixaban (ELIQUIS) tablet 5 mg, 5 mg, Oral, BID With Meals, Juan Manuel Page MD    busPIRone (BUSPAR) tablet 10 mg, 10 mg, Oral, Q12H PRN, Juan Manuel Page MD    calcitriol (ROCALTROL) capsule 0.5 mcg, 0.5 mcg, Oral, Daily, Juan Manuel Page MD    carvedilol (COREG) tablet 3.125 mg, 3.125 mg, Oral, BID With Meals, Juan Manuel Page MD    cefTRIAXone (ROCEPHIN) 1,000 mg in sodium chloride 0.9 % 100 mL IVPB, 1,000 mg, Intravenous, Q24H, Juan Manuel Page MD    cholecalciferol (VITAMIN D3) tablet 5,000 Units, 5,000 Units, Oral, Daily, Juan Manuel Page MD    dextrose (D50W) (25 g/50 mL) IV injection 25 g, 25 g, Intravenous, Q15 Min PRN, Juan Manuel Page MD    dextrose (GLUTOSE) oral gel 15 g, 15 g, Oral, Q15 Min PRN, Juan Manuel Page MD    docusate sodium (COLACE) capsule 200 mg, 200 mg, Oral, BID, Juan Manuel Page MD    donepezil (ARICEPT) tablet 10 mg, 10 mg, Oral, Daily, Juan Manuel Page MD    Dulaglutide solution pen-injector 4.5 mg, 4.5 mg, Subcutaneous, Weekly, Juan Manuel Page MD    ferric gluconate (FERRLECIT) 125 mg in sodium chloride 0.9 % 100 mL IVPB, 125 mg, Intravenous, Q24H, Brian Lomas MD    glucagon (GLUCAGEN) injection 1 mg, 1 mg, Intramuscular, Q15 Min " PRN, Juan Manuel Page MD    HYDROmorphone (DILAUDID) injection 1 mg, 1 mg, Intravenous, Q6H PRN, Juan Manuel Page MD    insulin detemir (LEVEMIR) injection 20 Units, 20 Units, Subcutaneous, Daily, Juan Manuel Page MD    Insulin Lispro (humaLOG) injection 2-9 Units, 2-9 Units, Subcutaneous, 4x Daily AC & at Bedtime, Juan Manuel Page MD    Lidocaine HCl 4 % gel gel 1 application , 1 application , Topical, PRN, Juan Manuel Page MD    magnesium hydroxide (MILK OF MAGNESIA) 400 MG/5ML suspension 15 mL, 15 mL, Oral, Daily PRN, Juan Manuel Page MD    melatonin tablet 6 mg, 6 mg, Oral, Nightly, Shruthi Isabel, SUSAN    metoclopramide (REGLAN) tablet 5 mg, 5 mg, Oral, TID AC, Juan Manuel Page MD    ondansetron (ZOFRAN) tablet 4 mg, 4 mg, Oral, Q6H PRN **OR** ondansetron (ZOFRAN) injection 4 mg, 4 mg, Intravenous, Q6H PRN, Juan Manuel Page MD    oxyCODONE (oxyCONTIN) 12 hr tablet 10 mg, 10 mg, Oral, Q12H, Juan Manuel Page MD    oxyCODONE (ROXICODONE) immediate release tablet 5 mg, 5 mg, Oral, Q4H PRN, Juan Manuel Page MD    pantoprazole (PROTONIX) EC tablet 40 mg, 40 mg, Oral, BID, Juan Manuel Page MD    polyethylene glycol (MIRALAX) packet 17 g, 17 g, Oral, Daily, Juan Manuel Page MD    pregabalin (LYRICA) capsule 100 mg, 100 mg, Oral, Daily, Juan Manuel Page MD    pregabalin (LYRICA) capsule 200 mg, 200 mg, Oral, Nightly, Juan Manuel Page MD    rosuvastatin (CRESTOR) tablet 40 mg, 40 mg, Oral, Nightly, Juan Manuel Page MD    saccharomyces boulardii (FLORASTOR) capsule 250 mg, 250 mg, Oral, BID, Juan Manuel Page MD    sennosides-docusate (PERICOLACE) 8.6-50 MG per tablet 1 tablet, 1 tablet, Oral, Nightly, Juan Manuel Page MD    sodium chloride 0.9 % flush 10 mL, 10 mL, Intravenous, Q12H, Juan Manuel Page MD    sodium chloride 0.9 % flush 10 mL, 10 mL, Intravenous, PRN, Juan Manuel Page MD    sucralfate  (CARAFATE) tablet 1 g, 1 g, Oral, TID AC, Juan Manuel Page MD    tamsulosin (FLOMAX) 24 hr capsule 0.4 mg, 0.4 mg, Oral, Daily, Juan Manuel Page MD    valsartan (DIOVAN) tablet 40 mg, 40 mg, Oral, Nightly, Juan Manuel Page MD    Data:     Code Status:    There are no questions and answers to display.       Family History   Problem Relation Age of Onset    Colon cancer Father     Heart disease Father     Colon cancer Sister     Colon polyps Sister     Alzheimer's disease Mother     Coronary artery disease Sister     Coronary artery disease Sister        Social History     Socioeconomic History    Marital status:    Tobacco Use    Smoking status: Former     Types: Cigarettes     Quit date:      Years since quittin.7    Smokeless tobacco: Never    Tobacco comments:     smoked in [place]   Vaping Use    Vaping Use: Never used   Substance and Sexual Activity    Alcohol use: No    Drug use: No    Sexual activity: Defer       Vitals:  T 97.6 P 65 R 16 Bp 138/84 Sp02 98% (room air)            I/O (24Hr):  No intake or output data in the 24 hours ending 23 1110    Labs and imaging:      Recent Results (from the past 12 hour(s))   POC Glucose Once    Collection Time: 23  7:12 AM    Specimen: Blood   Result Value Ref Range    Glucose 145 (H) 70 - 130 mg/dL                               Physical Examination:        Physical Exam  Vitals and nursing note reviewed.   Constitutional:       Appearance: Normal appearance. He is well-developed. He is obese.   HENT:      Head: Normocephalic and atraumatic.      Right Ear: External ear normal.      Left Ear: External ear normal.      Nose: Nose normal.      Mouth/Throat:      Mouth: Mucous membranes are moist.      Pharynx: Oropharynx is clear.   Eyes:      Pupils: Pupils are equal, round, and reactive to light.   Cardiovascular:      Rate and Rhythm: Normal rate. Rhythm irregular.      Heart sounds: Normal heart sounds.   Pulmonary:       Effort: Pulmonary effort is normal.      Breath sounds: Normal breath sounds.   Abdominal:      General: Bowel sounds are normal.      Palpations: Abdomen is soft.      Comments: obese   Musculoskeletal:         General: Normal range of motion.      Cervical back: Normal range of motion and neck supple.      Comments: Generalized weakness   Skin:     General: Skin is warm and dry.      Comments: Wound vac intact   Neurological:      Mental Status: He is alert and oriented to person, place, and time.      Deep Tendon Reflexes: Reflexes are normal and symmetric.   Psychiatric:         Behavior: Behavior normal.         Assessment:            * No active hospital problems. *    Past Medical History:   Diagnosis Date    Arthritis     Autonomic disease     CAD (coronary artery disease) 02/06/2017    Cervical radiculopathy 09/16/2021    Chronic constipation with acute exaccerbation 05/10/2021    Coronary artery disease     Degeneration of cervical intervertebral disc 08/11/2021    Diabetes mellitus     Diabetic foot ulcer 08/31/2020    Diabetic polyneuropathy associated with type 2 diabetes mellitus 01/18/2021    Elevated cholesterol     Gastroesophageal reflux disease 05/13/2019    Headache     HTN (hypertension), benign 05/03/2017    Hyperlipidemia     Hypertension     Mixed hyperlipidemia 02/07/2017    Multiple lung nodules 01/26/2020    5mm, 9 mm RLL identified 1/2020, not present 10/2019.    Myocardial infarction     Osteomyelitis 01/22/2020    Osteomyelitis of fifth toe of right foot 10/07/2019    Pancreatitis     Persistent insomnia 01/20/2020    Renal disorder     Sleep apnea 02/06/2017    Sleep apnea with use of continuous positive airway pressure (CPAP)     NON-COMPLIANT    Slow transit constipation 01/16/2019    Spinal stenosis in cervical region 09/16/2021    Vitamin D deficiency 03/02/2021        Plan:        Proteus wound infection s/p debridement  New onset atrial fibrillation  Acute on chronic diastolic  CHF  CKD3  Multifactorial anemia  DM2 with hyperglycemia on long term insulin  BRITTNI  Chronic pain syndrome  Chronic neck pain with radiculopathy  Diabetic peripheral neuropathy  Hx CAD  GERD  Possible gastroparesis  Continue current treatment. Monitor counts. Increase activity. Labs Thursday. Wound care per wound care team. Continue IV antibiotics. Appreciate nephrology evaluation and treatment. Continue glycemic control efforts. Maintain patient safety. Continue pain control efforts.       Electronically signed by SUSAN Muñoz on 9/26/2023 at 11:10 CDT

## 2023-09-27 ENCOUNTER — DOCUMENTATION (OUTPATIENT)
Dept: PASTORAL CARE | Facility: HOSPITAL | Age: 68
End: 2023-09-27
Payer: COMMERCIAL

## 2023-09-27 LAB
GEN 5 2HR TROPONIN T REFLEX: 43 NG/L
GLUCOSE BLDC GLUCOMTR-MCNC: 153 MG/DL (ref 70–130)
GLUCOSE BLDC GLUCOMTR-MCNC: 253 MG/DL (ref 70–130)
GLUCOSE BLDC GLUCOMTR-MCNC: 290 MG/DL (ref 70–130)
GLUCOSE BLDC GLUCOMTR-MCNC: 459 MG/DL (ref 70–130)
QT INTERVAL: 422 MS
QTC INTERVAL: 428 MS
TROPONIN T DELTA: -13 NG/L

## 2023-09-27 PROCEDURE — 97110 THERAPEUTIC EXERCISES: CPT

## 2023-09-27 PROCEDURE — 82948 REAGENT STRIP/BLOOD GLUCOSE: CPT

## 2023-09-27 PROCEDURE — 25010000002 CEFTRIAXONE PER 250 MG: Performed by: INTERNAL MEDICINE

## 2023-09-27 PROCEDURE — 25010000002 ONDANSETRON PER 1 MG: Performed by: INTERNAL MEDICINE

## 2023-09-27 PROCEDURE — 63710000001 INSULIN LISPRO (HUMAN) PER 5 UNITS: Performed by: INTERNAL MEDICINE

## 2023-09-27 PROCEDURE — 63710000001 INSULIN DETEMIR PER 5 UNITS: Performed by: INTERNAL MEDICINE

## 2023-09-27 PROCEDURE — 84484 ASSAY OF TROPONIN QUANT: CPT | Performed by: NURSE PRACTITIONER

## 2023-09-27 PROCEDURE — 97530 THERAPEUTIC ACTIVITIES: CPT

## 2023-09-27 PROCEDURE — 97535 SELF CARE MNGMENT TRAINING: CPT

## 2023-09-27 NOTE — PROGRESS NOTES
Nephrology ([facility]) Progress Note      Patient:  Erick Luong  YOB: 1955  Date of Service: 9/27/2023  MRN: [id number]   Acct: [id number]   Primary Care Physician: Del Shetty MD  Advance Directive:   There are no questions and answers to display.     Admit Date: 9/18/2023       Hospital Day: 0  Referring Provider: JuanM anuel Page MD      Patient personally seen and examined.  Complete chart including Consults, Notes, Operative Reports, Labs, Cardiology, and Radiology studies reviewed as able.        Subjective:  Erick Luong is a 68 y.o. male for whom we were consulted for evaluation and treatment of chronic kidney disease stage IIIb.  He has stage IIIb chronic kidney disease baseline and follows with Dr. Lomas in the office as he has diabetic nephropathy.  He has a history of insulin-dependent type 2 diabetes, hypertension, abdominal obesity, obstructive sleep apnea, diabetic foot ulcer and coronary artery disease.  He was accepted as a transfer from [facility].  Patient was admitted at [facility] by Dr. Gomes and extensive debridement of left foot wound at plantar aspect.  Postoperative wound measuring 6 x 17 x 4 cm with exposed bone.  Surgical culture returned positive for Proteus Mirabills.  Patient needed long-term IV antibiotics.  He is now admitted for wound care, long-term IV antibiotics, chronic kidney disease and treatment of diastolic congestive heart failure.  Patient also has history of gastroparesis.      Today, no overnight events.  Feels edema is much improved this admission.  Still with some weakness.  Also with some chest pain and family with questions about further plans for evaluation of the chest pain.    Temp- 97.5  Pulse- 63  Resp- 20  BP- 126/69  O2%- 98      Allergies:  Cefepime, Bactrim [sulfamethoxazole-trimethoprim], Vancomycin, Zolpidem, Zolpidem tartrate, and Metronidazole    Home Meds:  Medications Prior to  Admission   Medication Sig Dispense Refill Last Dose    amitriptyline (ELAVIL) 25 MG tablet Take 2 tablets by mouth Daily at bedtime. 60 tablet 1     amoxicillin-clavulanate (Augmentin) 500-125 MG per tablet Take 1 tablet by mouth every 12 hours with meals for 7 days. 14 tablet 0     ascorbic acid (VITAMIN C) 1000 MG tablet Take 1 tablet by mouth Daily. 30 tablet 3     Aspirin 81 MG capsule Take 81 mg by mouth Daily.       bumetanide (BUMEX) 2 MG tablet Take 1 tablet by mouth Daily. Take 2 tablets in morning;1 tablet at night       busPIRone (BUSPAR) 10 MG tablet Take 1 tablet by mouth 2 (Two) Times a Day As Needed. 100 tablet 3     calcitriol (ROCALTROL) 0.5 MCG capsule Take 1 capsule by mouth Daily. 90 capsule 4     carvedilol (COREG) 6.25 MG tablet Take 1 tablet by mouth 2 (Two) Times a Day. 180 tablet 4     Cholecalciferol (D-5000) 125 MCG (5000 UT) tablet Take 1 tablet by mouth Daily Before Lunch. 30 tablet 2     cloNIDine (CATAPRES) 0.1 MG tablet Take 1 tablet by mouth Every 12 (Twelve) Hours. 60 tablet 2     Diclofenac Sodium (VOLTAREN) 1 % gel gel Apply 2 g topically to the appropriate area as directed 4 (Four) Times a Day As Needed. 300 g 11     donepezil (ARICEPT) 10 MG tablet Take 1 tablet by mouth Daily. 90 tablet 4     Dulaglutide (Trulicity) 4.5 MG/0.5ML solution pen-injector Inject 0.5 mL under the skin into the appropriate area as directed 1 (One) Time Per Week. 2 mL 11     empagliflozin (JARDIANCE) 25 MG tablet tablet Take 1 tablet by mouth Daily. 30 tablet 2     HYDROcodone-acetaminophen (NORCO) 7.5-325 MG per tablet Take 1 tablet by mouth 2 (Two) Times a Day As Needed. 40 tablet 0     Insulin Regular Human, Conc, (HumuLIN R U-500 KwikPen) 500 UNIT/ML solution pen-injector CONCENTRATED injection Inject 120 Units under the skin into the appropriate area as directed 2 (Two) Times a Day with breakfast and dinner. (Patient taking differently: Inject 120 Units under the skin into the appropriate area as  directed 3 (Three) Times a Day Before Meals.) 18 mL 5     midodrine (PROAMATINE) 10 MG tablet Take 1 tablet by mouth 3 (Three) Times a Day. 270 tablet 4     ondansetron ODT (ZOFRAN-ODT) 8 MG disintegrating tablet Place 1 tablet under tongue 3 times a day as needed. 25 tablet 1     pantoprazole (Protonix) 40 MG EC tablet Take 1 tablet by mouth 2 (Two) Times a Day. 180 tablet 4     polyethylene glycol (MIRALAX) 17 g packet Take 17 g by mouth Daily. Obtain OTC       pregabalin (LYRICA) 100 MG capsule Take 1 capsule by mouth Daily With Breakfast AND 2 capsules Every Night. 90 capsule 2     rosuvastatin (CRESTOR) 40 MG tablet Take 1 tablet by mouth Every Night. 90 tablet 3     sennosides-docusate (PERICOLACE) 8.6-50 MG per tablet Take 1 tablet by mouth Every Night. Obtain OTC       sennosides-docusate (PERICOLACE) 8.6-50 MG per tablet Take 1 tablet by mouth every night at bedtime. 30 tablet 2     sodium hypochlorite (DAKIN'S 1/4 STRENGTH) 0.125 % solution topical solution 0.125% Apply to affected area twice daily 473 mL 3     tamsulosin (FLOMAX) 0.4 MG capsule 24 hr capsule Take 1 capsule by mouth Daily. 90 capsule 1        Medicines:  Current Facility-Administered Medications   Medication Dose Route Frequency Provider Last Rate Last Admin    acetaminophen (TYLENOL) tablet 650 mg  650 mg Oral Q4H PRN Juan Manuel Page MD        Or    acetaminophen (TYLENOL) suppository 650 mg  650 mg Rectal Q4H PRN Juan Manuel Page MD        aluminum-magnesium hydroxide-simethicone (MAALOX MAX) 400-400-40 MG/5ML suspension 30 mL  30 mL Oral Q4H PRN Juan Manuel Page MD        apixaban (ELIQUIS) tablet 5 mg  5 mg Oral BID With Meals Juan Manuel Page MD        busPIRone (BUSPAR) tablet 10 mg  10 mg Oral Q12H PRN Juan Manuel Page MD        calcitriol (ROCALTROL) capsule 0.5 mcg  0.5 mcg Oral Daily Juan Manuel Page MD        carvedilol (COREG) tablet 3.125 mg  3.125 mg Oral BID With Meals Juan Manuel Page  MD Santana        cefTRIAXone (ROCEPHIN) 1,000 mg in sodium chloride 0.9 % 100 mL IVPB  1,000 mg Intravenous Q24H Juan Manuel Page MD        cholecalciferol (VITAMIN D3) tablet 5,000 Units  5,000 Units Oral Daily Juan Manuel Page MD        dextrose (D50W) (25 g/50 mL) IV injection 25 g  25 g Intravenous Q15 Min PRN Juan Manuel Page MD        dextrose (GLUTOSE) oral gel 15 g  15 g Oral Q15 Min PRN Juan Manuel Page MD        docusate sodium (COLACE) capsule 200 mg  200 mg Oral BID Juan Manuel Page MD        donepezil (ARICEPT) tablet 10 mg  10 mg Oral Daily Juan Manuel Page MD        Dulaglutide solution pen-injector 4.5 mg  4.5 mg Subcutaneous Weekly Juan Manuel Page MD        glucagon (GLUCAGEN) injection 1 mg  1 mg Intramuscular Q15 Min PRN Juan Manuel Page MD        HYDROmorphone (DILAUDID) injection 1 mg  1 mg Intravenous Q6H PRN Juan Manuel Page MD        insulin detemir (LEVEMIR) injection 20 Units  20 Units Subcutaneous Daily Juan Manuel Page MD        Insulin Lispro (humaLOG) injection 2-9 Units  2-9 Units Subcutaneous 4x Daily AC & at Bedtime Juan Manuel Page MD        Lidocaine HCl 4 % gel gel 1 application   1 application  Topical PRN Juan Manuel Page MD        magnesium hydroxide (MILK OF MAGNESIA) 400 MG/5ML suspension 15 mL  15 mL Oral Daily PRN Juan Manuel aPge MD        melatonin tablet 6 mg  6 mg Oral Nightly Shruthi Isabel APRN        metoclopramide (REGLAN) tablet 5 mg  5 mg Oral TID AC Juan Manuel Page MD        ondansetron (ZOFRAN) tablet 4 mg  4 mg Oral Q6H PRN Juan Manuel Page MD        Or    ondansetron (ZOFRAN) injection 4 mg  4 mg Intravenous Q6H PRN Juan Manuel Page MD        oxyCODONE (oxyCONTIN) 12 hr tablet 10 mg  10 mg Oral Q12H Juan Manuel Page MD        oxyCODONE (ROXICODONE) immediate release tablet 5 mg  5 mg Oral Q4H PRN Juan Manuel Page MD        pantoprazole  (PROTONIX) EC tablet 40 mg  40 mg Oral BID Juan Manuel Page MD        polyethylene glycol (MIRALAX) packet 17 g  17 g Oral Daily Juan Manuel Page MD        pregabalin (LYRICA) capsule 100 mg  100 mg Oral Daily Juan Manuel Page MD        pregabalin (LYRICA) capsule 200 mg  200 mg Oral Nightly Juan Manuel Page MD        rosuvastatin (CRESTOR) tablet 40 mg  40 mg Oral Nightly Juan Manuel Page MD        saccharomyces boulardii (FLORASTOR) capsule 250 mg  250 mg Oral BID Juan Manuel Page MD        sennosides-docusate (PERICOLACE) 8.6-50 MG per tablet 1 tablet  1 tablet Oral Nightly Juan Manuel Page MD        sodium chloride 0.9 % flush 10 mL  10 mL Intravenous Q12H Juan Manuel Page MD        sodium chloride 0.9 % flush 10 mL  10 mL Intravenous PRN Juan Manuel Page MD        sucralfate (CARAFATE) tablet 1 g  1 g Oral TID AC Juan Manuel Page MD        tamsulosin (FLOMAX) 24 hr capsule 0.4 mg  0.4 mg Oral Daily Juan Manuel Page MD        valsartan (DIOVAN) tablet 40 mg  40 mg Oral Nightly Juan Manuel Page MD           Past Medical History:  Past Medical History:   Diagnosis Date    Arthritis     Autonomic disease     CAD (coronary artery disease) 02/06/2017    Cervical radiculopathy 09/16/2021    Chronic constipation with acute exaccerbation 05/10/2021    Coronary artery disease     Degeneration of cervical intervertebral disc 08/11/2021    Diabetes mellitus     Diabetic foot ulcer 08/31/2020    Diabetic polyneuropathy associated with type 2 diabetes mellitus 01/18/2021    Elevated cholesterol     Gastroesophageal reflux disease 05/13/2019    Headache     HTN (hypertension), benign 05/03/2017    Hyperlipidemia     Hypertension     Mixed hyperlipidemia 02/07/2017    Multiple lung nodules 01/26/2020    5mm, 9 mm RLL identified 1/2020, not present 10/2019.    Myocardial infarction     Osteomyelitis 01/22/2020    Osteomyelitis of fifth toe of right foot  10/07/2019    Pancreatitis     Persistent insomnia 01/20/2020    Renal disorder     Sleep apnea 02/06/2017    Sleep apnea with use of continuous positive airway pressure (CPAP)     NON-COMPLIANT    Slow transit constipation 01/16/2019    Spinal stenosis in cervical region 09/16/2021    Vitamin D deficiency 03/02/2021       Past Surgical History:  Past Surgical History:   Procedure Laterality Date    ABDOMINAL SURGERY      AMPUTATION FOOT / TOE Left 10/2021    5th digit     ANTERIOR CERVICAL DISCECTOMY W/ FUSION N/A 8/5/2022    Procedure: CERVICAL DISCECTOMY ANTERIOR WITH FUSION C5-6 with possible plating of C5-7 with neuromonitoring and 1 c-arm;  Surgeon: Karel Soliz MD;  Location:  [facility] OR;  Service: Neurosurgery;  Laterality: N/A;    APPENDECTOMY      BACK SURGERY      CARDIAC CATHETERIZATION Left 02/08/2021    Procedure: Left Heart Cath w poss intervention left anatomical snuff box acess;  Surgeon: Omkar Charles DO;  Location:  [facility] CATH INVASIVE LOCATION;  Service: Cardiology;  Laterality: Left;    CARDIAC SURGERY      CATARACT EXTRACTION      CERVICAL SPINE SURGERY      COLONOSCOPY N/A 01/31/2017    Normal exam repeat in 5 years    COLONOSCOPY N/A 02/11/2019    Mild acute inflammation    COLONOSCOPY W/ POLYPECTOMY  03/04/2014    Hyperplastic polyp    CORONARY ARTERY BYPASS GRAFT  10/2015    ENDOSCOPY  04/13/2011    Gastritis with hemorrhage    ENDOSCOPY N/A 05/05/2017    Normal exam    ENDOSCOPY N/A 02/11/2019    Gastritis    ENDOSCOPY N/A 09/01/2020    Non-erosive gastritis with hemorrhage    ENDOSCOPY N/A 02/10/2021    Esophagitis    FOOT SURGERY Left     INCISION AND DRAINAGE OF WOUND Left 09/2007    spider bite       Family History  Family History   Problem Relation Age of Onset    Colon cancer Father     Heart disease Father     Colon cancer Sister     Colon polyps Sister     Alzheimer's disease Mother     Coronary artery disease Sister     Coronary artery disease Sister        Social  History  Social History     Socioeconomic History    Marital status:    Tobacco Use    Smoking status: Former     Types: Cigarettes     Quit date:      Years since quittin.7    Smokeless tobacco: Never    Tobacco comments:     smoked in highschool   Vaping Use    Vaping Use: Never used   Substance and Sexual Activity    Alcohol use: No    Drug use: No    Sexual activity: Defer       Review of Systems:  History obtained from chart review and the patient  General ROS: No fever or chills  Respiratory ROS: No cough, shortness of breath, wheezing  Cardiovascular ROS: No chest pain or palpitations  Gastrointestinal ROS: No abdominal pain or melena  Genito-Urinary ROS: No dysuria or hematuria  Psych ROS: No anxiety and depression  14 point ROS reviewed with the patient and negative except as noted above and in the HPI unless unable to obtain.    Objective:  No data found.    No intake or output data in the 24 hours ending 23  General: awake/alert   Chest:  clear to auscultation bilaterally without respiratory distress  CVS: regular rate and rhythm  Abdominal: soft, nontender, positive bowel sounds  Extremities: lle edema  Skin: lle wound vac, warm/dry      Labs:  Results from last 7 days   Lab Units 23  0623  0603 23  0913   WBC 10*3/mm3 6.70 10.41 7.45   HEMOGLOBIN g/dL 9.1* 8.7* 8.9*   HEMATOCRIT % 30.3* 28.6* 29.4*   PLATELETS 10*3/mm3 288 330 328         Results from last 7 days   Lab Units 23  0627 23  0627 23  0603   SODIUM mmol/L 141 139 139   POTASSIUM mmol/L 4.6 4.2 4.3   CHLORIDE mmol/L 104 103 101   CO2 mmol/L 26.0 26.0 24.0   BUN mg/dL 32* 35* 32*   CREATININE mg/dL 2.19* 2.84* 2.46*   CALCIUM mg/dL 9.4 8.8 9.2   EGFR mL/min/1.73 32.2* 23.6* 28.0*   GLUCOSE mg/dL 93 90 150*       Radiology:   Imaging Results (Last 72 Hours)       ** No results found for the last 72 hours. **            Culture:  No results found for: BLOODCX, URINECX, WOUNDCX,  MRSACX, RESPCX, STOOLCX      Assessment   Acute kidney injury/ATN  Chronic kidney disease stage IIIb  Diabetes type 2 with diabetic nephropathy  Diabetic foot ulcer with surgical debridement and wound VAC  Chronic diastolic congestive heart failure  Wound infection with Proteus  Gastroparesis  Secondary hyperparathyroidism  Anemia and chronic kidney disease  Chest Pain    Plan:  Discussed with patient, nursing, fmaily  Work-up reviewed today  Monitor labs per LTACH schedule  Calcitriol  Continue to monitor renal function has improved 9/25 with adjustment in medication      Willy Arteaga MD  9/27/2023  17:17 CDT

## 2023-09-27 NOTE — PROGRESS NOTES
Nephrology ([facility]) Progress Note      Patient:  Erick Luong  YOB: 1955  Date of Service: 9/26/2023  MRN: [id number]   Acct: [id number]   Primary Care Physician: Del Shetty MD  Advance Directive:   There are no questions and answers to display.     Admit Date: 9/18/2023       Hospital Day: 0  Referring Provider: Juan Manuel Page MD      Patient personally seen and examined.  Complete chart including Consults, Notes, Operative Reports, Labs, Cardiology, and Radiology studies reviewed as able.        Subjective:  Erick Luong is a 68 y.o. male for whom we were consulted for evaluation and treatment of chronic kidney disease stage IIIb.  He has stage IIIb chronic kidney disease baseline and follows with Dr. Lomas in the office as he has diabetic nephropathy.  He has a history of insulin-dependent type 2 diabetes, hypertension, abdominal obesity, obstructive sleep apnea, diabetic foot ulcer and coronary artery disease.  He was accepted as a transfer from [facility].  Patient was admitted at [facility] by Dr. Gomes and extensive debridement of left foot wound at plantar aspect.  Postoperative wound measuring 6 x 17 x 4 cm with exposed bone.  Surgical culture returned positive for Proteus Mirabills.  Patient needed long-term IV antibiotics.  He is now admitted for wound care, long-term IV antibiotics, chronic kidney disease and treatment of diastolic congestive heart failure.  Patient also has history of gastroparesis.      Today, no overnight events.  Feels edema is much improved this admission.  Still with some weakness.      Temp- 97.6  Pulse- 65  Resp- 16  BP- 138/84  O2%- 98      Allergies:  Cefepime, Bactrim [sulfamethoxazole-trimethoprim], Vancomycin, Zolpidem, Zolpidem tartrate, and Metronidazole    Home Meds:  Medications Prior to Admission   Medication Sig Dispense Refill Last Dose    amitriptyline (ELAVIL) 25 MG tablet Take 2 tablets by  mouth Daily at bedtime. 60 tablet 1     amoxicillin-clavulanate (Augmentin) 500-125 MG per tablet Take 1 tablet by mouth every 12 hours with meals for 7 days. 14 tablet 0     ascorbic acid (VITAMIN C) 1000 MG tablet Take 1 tablet by mouth Daily. 30 tablet 3     Aspirin 81 MG capsule Take 81 mg by mouth Daily.       bumetanide (BUMEX) 2 MG tablet Take 1 tablet by mouth Daily. Take 2 tablets in morning;1 tablet at night       busPIRone (BUSPAR) 10 MG tablet Take 1 tablet by mouth 2 (Two) Times a Day As Needed. 100 tablet 3     calcitriol (ROCALTROL) 0.5 MCG capsule Take 1 capsule by mouth Daily. 90 capsule 4     carvedilol (COREG) 6.25 MG tablet Take 1 tablet by mouth 2 (Two) Times a Day. 180 tablet 4     Cholecalciferol (D-5000) 125 MCG (5000 UT) tablet Take 1 tablet by mouth Daily Before Lunch. 30 tablet 2     cloNIDine (CATAPRES) 0.1 MG tablet Take 1 tablet by mouth Every 12 (Twelve) Hours. 60 tablet 2     Diclofenac Sodium (VOLTAREN) 1 % gel gel Apply 2 g topically to the appropriate area as directed 4 (Four) Times a Day As Needed. 300 g 11     donepezil (ARICEPT) 10 MG tablet Take 1 tablet by mouth Daily. 90 tablet 4     Dulaglutide (Trulicity) 4.5 MG/0.5ML solution pen-injector Inject 0.5 mL under the skin into the appropriate area as directed 1 (One) Time Per Week. 2 mL 11     empagliflozin (JARDIANCE) 25 MG tablet tablet Take 1 tablet by mouth Daily. 30 tablet 2     HYDROcodone-acetaminophen (NORCO) 7.5-325 MG per tablet Take 1 tablet by mouth 2 (Two) Times a Day As Needed. 40 tablet 0     Insulin Regular Human, Conc, (HumuLIN R U-500 KwikPen) 500 UNIT/ML solution pen-injector CONCENTRATED injection Inject 120 Units under the skin into the appropriate area as directed 2 (Two) Times a Day with breakfast and dinner. (Patient taking differently: Inject 120 Units under the skin into the appropriate area as directed 3 (Three) Times a Day Before Meals.) 18 mL 5     midodrine (PROAMATINE) 10 MG tablet Take 1 tablet  by mouth 3 (Three) Times a Day. 270 tablet 4     ondansetron ODT (ZOFRAN-ODT) 8 MG disintegrating tablet Place 1 tablet under tongue 3 times a day as needed. 25 tablet 1     pantoprazole (Protonix) 40 MG EC tablet Take 1 tablet by mouth 2 (Two) Times a Day. 180 tablet 4     polyethylene glycol (MIRALAX) 17 g packet Take 17 g by mouth Daily. Obtain OTC       pregabalin (LYRICA) 100 MG capsule Take 1 capsule by mouth Daily With Breakfast AND 2 capsules Every Night. 90 capsule 2     rosuvastatin (CRESTOR) 40 MG tablet Take 1 tablet by mouth Every Night. 90 tablet 3     sennosides-docusate (PERICOLACE) 8.6-50 MG per tablet Take 1 tablet by mouth Every Night. Obtain OTC       sennosides-docusate (PERICOLACE) 8.6-50 MG per tablet Take 1 tablet by mouth every night at bedtime. 30 tablet 2     sodium hypochlorite (DAKIN'S 1/4 STRENGTH) 0.125 % solution topical solution 0.125% Apply to affected area twice daily 473 mL 3     tamsulosin (FLOMAX) 0.4 MG capsule 24 hr capsule Take 1 capsule by mouth Daily. 90 capsule 1        Medicines:  Current Facility-Administered Medications   Medication Dose Route Frequency Provider Last Rate Last Admin    acetaminophen (TYLENOL) tablet 650 mg  650 mg Oral Q4H PRN Juan Manuel Page MD        Or    acetaminophen (TYLENOL) suppository 650 mg  650 mg Rectal Q4H PRN Juan Manuel Page MD        aluminum-magnesium hydroxide-simethicone (MAALOX MAX) 400-400-40 MG/5ML suspension 30 mL  30 mL Oral Q4H PRN Juan Manuel Page MD        apixaban (ELIQUIS) tablet 5 mg  5 mg Oral BID With Meals Juan Manuel Page MD        busPIRone (BUSPAR) tablet 10 mg  10 mg Oral Q12H PRN Juan Manuel Page MD        calcitriol (ROCALTROL) capsule 0.5 mcg  0.5 mcg Oral Daily Juan Manuel Page MD        carvedilol (COREG) tablet 3.125 mg  3.125 mg Oral BID With Meals Juan Manuel Page MD        cefTRIAXone (ROCEPHIN) 1,000 mg in sodium chloride 0.9 % 100 mL IVPB  1,000 mg Intravenous  Q24H Juan Manuel Page MD        cholecalciferol (VITAMIN D3) tablet 5,000 Units  5,000 Units Oral Daily Juan Manuel Page MD        dextrose (D50W) (25 g/50 mL) IV injection 25 g  25 g Intravenous Q15 Min PRN Juan Manuel Page MD        dextrose (GLUTOSE) oral gel 15 g  15 g Oral Q15 Min PRN Juan Manuel Page MD        docusate sodium (COLACE) capsule 200 mg  200 mg Oral BID Juan Manuel Page MD        donepezil (ARICEPT) tablet 10 mg  10 mg Oral Daily Juan Manuel Page MD        Dulaglutide solution pen-injector 4.5 mg  4.5 mg Subcutaneous Weekly Juan Manuel Page MD        glucagon (GLUCAGEN) injection 1 mg  1 mg Intramuscular Q15 Min PRN Juan Manuel Page MD        HYDROmorphone (DILAUDID) injection 1 mg  1 mg Intravenous Q6H PRN Juan Manuel Page MD        insulin detemir (LEVEMIR) injection 20 Units  20 Units Subcutaneous Daily Juan Manuel Page MD        Insulin Lispro (humaLOG) injection 2-9 Units  2-9 Units Subcutaneous 4x Daily AC & at Bedtime Juan Manuel Page MD        Lidocaine HCl 4 % gel gel 1 application   1 application  Topical PRN Juan Manuel Page MD        magnesium hydroxide (MILK OF MAGNESIA) 400 MG/5ML suspension 15 mL  15 mL Oral Daily PRN Juan Manuel Page MD        melatonin tablet 6 mg  6 mg Oral Nightly Shruthi Isabel APRN        metoclopramide (REGLAN) tablet 5 mg  5 mg Oral TID AC Juan Manuel Page MD        ondansetron (ZOFRAN) tablet 4 mg  4 mg Oral Q6H PRN Juan Manuel Page MD        Or    ondansetron (ZOFRAN) injection 4 mg  4 mg Intravenous Q6H PRN Juan Manuel Page MD        oxyCODONE (oxyCONTIN) 12 hr tablet 10 mg  10 mg Oral Q12H Juan Manuel Page MD        oxyCODONE (ROXICODONE) immediate release tablet 5 mg  5 mg Oral Q4H PRN Juan Manuel Page MD        pantoprazole (PROTONIX) EC tablet 40 mg  40 mg Oral BID Juan Manuel Page MD        polyethylene glycol (MIRALAX) packet 17 g   17 g Oral Daily Juan Manuel Page MD        pregabalin (LYRICA) capsule 100 mg  100 mg Oral Daily Juan Manuel Page MD        pregabalin (LYRICA) capsule 200 mg  200 mg Oral Nightly Juan Manuel Page MD        rosuvastatin (CRESTOR) tablet 40 mg  40 mg Oral Nightly Juan Manuel Page MD        saccharomyces boulardii (FLORASTOR) capsule 250 mg  250 mg Oral BID Juan Manuel Page MD        sennosides-docusate (PERICOLACE) 8.6-50 MG per tablet 1 tablet  1 tablet Oral Nightly Juan Manuel Page MD        sodium chloride 0.9 % flush 10 mL  10 mL Intravenous Q12H Juan Manuel Page MD        sodium chloride 0.9 % flush 10 mL  10 mL Intravenous PRN Juan Manuel Page MD        sucralfate (CARAFATE) tablet 1 g  1 g Oral TID AC Juan Manuel Page MD        tamsulosin (FLOMAX) 24 hr capsule 0.4 mg  0.4 mg Oral Daily Juan Manuel Page MD        valsartan (DIOVAN) tablet 40 mg  40 mg Oral Nightly Juan Manuel Page MD           Past Medical History:  Past Medical History:   Diagnosis Date    Arthritis     Autonomic disease     CAD (coronary artery disease) 02/06/2017    Cervical radiculopathy 09/16/2021    Chronic constipation with acute exaccerbation 05/10/2021    Coronary artery disease     Degeneration of cervical intervertebral disc 08/11/2021    Diabetes mellitus     Diabetic foot ulcer 08/31/2020    Diabetic polyneuropathy associated with type 2 diabetes mellitus 01/18/2021    Elevated cholesterol     Gastroesophageal reflux disease 05/13/2019    Headache     HTN (hypertension), benign 05/03/2017    Hyperlipidemia     Hypertension     Mixed hyperlipidemia 02/07/2017    Multiple lung nodules 01/26/2020    5mm, 9 mm RLL identified 1/2020, not present 10/2019.    Myocardial infarction     Osteomyelitis 01/22/2020    Osteomyelitis of fifth toe of right foot 10/07/2019    Pancreatitis     Persistent insomnia 01/20/2020    Renal disorder     Sleep apnea 02/06/2017    Sleep apnea  with use of continuous positive airway pressure (CPAP)     NON-COMPLIANT    Slow transit constipation 01/16/2019    Spinal stenosis in cervical region 09/16/2021    Vitamin D deficiency 03/02/2021       Past Surgical History:  Past Surgical History:   Procedure Laterality Date    ABDOMINAL SURGERY      AMPUTATION FOOT / TOE Left 10/2021    5th digit     ANTERIOR CERVICAL DISCECTOMY W/ FUSION N/A 8/5/2022    Procedure: CERVICAL DISCECTOMY ANTERIOR WITH FUSION C5-6 with possible plating of C5-7 with neuromonitoring and 1 c-arm;  Surgeon: Karel Soliz MD;  Location:  [facility] OR;  Service: Neurosurgery;  Laterality: N/A;    APPENDECTOMY      BACK SURGERY      CARDIAC CATHETERIZATION Left 02/08/2021    Procedure: Left Heart Cath w poss intervention left anatomical snuff box acess;  Surgeon: Omkar Charles DO;  Location:  [facility] CATH INVASIVE LOCATION;  Service: Cardiology;  Laterality: Left;    CARDIAC SURGERY      CATARACT EXTRACTION      CERVICAL SPINE SURGERY      COLONOSCOPY N/A 01/31/2017    Normal exam repeat in 5 years    COLONOSCOPY N/A 02/11/2019    Mild acute inflammation    COLONOSCOPY W/ POLYPECTOMY  03/04/2014    Hyperplastic polyp    CORONARY ARTERY BYPASS GRAFT  10/2015    ENDOSCOPY  04/13/2011    Gastritis with hemorrhage    ENDOSCOPY N/A 05/05/2017    Normal exam    ENDOSCOPY N/A 02/11/2019    Gastritis    ENDOSCOPY N/A 09/01/2020    Non-erosive gastritis with hemorrhage    ENDOSCOPY N/A 02/10/2021    Esophagitis    FOOT SURGERY Left     INCISION AND DRAINAGE OF WOUND Left 09/2007    spider bite       Family History  Family History   Problem Relation Age of Onset    Colon cancer Father     Heart disease Father     Colon cancer Sister     Colon polyps Sister     Alzheimer's disease Mother     Coronary artery disease Sister     Coronary artery disease Sister        Social History  Social History     Socioeconomic History    Marital status:    Tobacco Use    Smoking status: Former      Types: Cigarettes     Quit date:      Years since quittin.7    Smokeless tobacco: Never    Tobacco comments:     smoked in highschool   Vaping Use    Vaping Use: Never used   Substance and Sexual Activity    Alcohol use: No    Drug use: No    Sexual activity: Defer       Review of Systems:  History obtained from chart review and the patient  General ROS: No fever or chills  Respiratory ROS: No cough, shortness of breath, wheezing  Cardiovascular ROS: No chest pain or palpitations  Gastrointestinal ROS: No abdominal pain or melena  Genito-Urinary ROS: No dysuria or hematuria  Psych ROS: No anxiety and depression  14 point ROS reviewed with the patient and negative except as noted above and in the HPI unless unable to obtain.    Objective:  No data found.    No intake or output data in the 24 hours ending 23  General: awake/alert   Chest:  clear to auscultation bilaterally without respiratory distress  CVS: regular rate and rhythm  Abdominal: soft, nontender, positive bowel sounds  Extremities: lle edema  Skin: lle wound vac, warm/dry      Labs:  Results from last 7 days   Lab Units 23  0623  0603 23  0913   WBC 10*3/mm3 6.70 10.41 7.45   HEMOGLOBIN g/dL 9.1* 8.7* 8.9*   HEMATOCRIT % 30.3* 28.6* 29.4*   PLATELETS 10*3/mm3 288 330 328         Results from last 7 days   Lab Units 23  0623  0623  0603   SODIUM mmol/L 141 139 139   POTASSIUM mmol/L 4.6 4.2 4.3   CHLORIDE mmol/L 104 103 101   CO2 mmol/L 26.0 26.0 24.0   BUN mg/dL 32* 35* 32*   CREATININE mg/dL 2.19* 2.84* 2.46*   CALCIUM mg/dL 9.4 8.8 9.2   EGFR mL/min/1.73 32.2* 23.6* 28.0*   GLUCOSE mg/dL 93 90 150*       Radiology:   Imaging Results (Last 72 Hours)       ** No results found for the last 72 hours. **            Culture:  No results found for: BLOODCX, URINECX, WOUNDCX, MRSACX, RESPCX, STOOLCX      Assessment   Acute kidney injury/ATN  Chronic kidney disease stage IIIb  Diabetes type 2 with  diabetic nephropathy  Diabetic foot ulcer with surgical debridement and wound VAC  Chronic diastolic congestive heart failure  Wound infection with Proteus  Gastroparesis  Secondary hyperparathyroidism  Anemia and chronic kidney disease    Plan:  Discussed with patient, nursing  Work-up reviewed today  Monitor labs per LTACH schedule  Calcitriol  Continue to monitor renal function has improved 9/25 with adjustment in medication      Willy Arteaga MD  9/26/2023  21:34 CDT

## 2023-09-27 NOTE — PROGRESS NOTES
[facility]  ABUNDIO Page M.D.  SUSAN Haney        Internal Medicine Progress Note    9/27/2023   09:28 CDT    Name:  Erick Luong  MRN:    [id number]     Acct:     [id number]   Room:  [address] [place] [address] Day: 0     Admit Date: 9/18/2023  6:30 PM  PCP: Del Shetyt MD    Subjective:     C/C: weakness, fatigue    Interval History: Status:  improved. Up to chair. No family at bedside. Afebrile. 02 sats stable on room air. Woke from sleep. Blood sugars elevated, but stable. Still with intermittent pain. Had episode of increased chest pain and increased anxiety overnight without significant EKG changes. Troponin elevated, but appears near his baseline. Pain free at this time.     Review of Systems   Constitutional: Positive for malaise/fatigue. Negative for chills, decreased appetite, weight gain and weight loss.   HENT:  Negative for congestion, ear discharge, hoarse voice and tinnitus.    Eyes:  Negative for blurred vision, discharge, visual disturbance and visual halos.   Cardiovascular:  Negative for chest pain, claudication, dyspnea on exertion, irregular heartbeat, leg swelling, orthopnea and paroxysmal nocturnal dyspnea.   Respiratory:  Negative for cough, shortness of breath, sputum production and wheezing.    Endocrine: Negative for cold intolerance, heat intolerance and polyuria.   Hematologic/Lymphatic: Negative for adenopathy. Does not bruise/bleed easily.   Skin:  Positive for poor wound healing. Negative for dry skin, itching and suspicious lesions.   Musculoskeletal:  Positive for neck pain. Negative for arthritis, back pain, falls, joint pain, muscle weakness and myalgias.   Gastrointestinal:  Negative for abdominal pain, constipation, diarrhea, dysphagia and hematemesis.   Genitourinary:  Negative for bladder incontinence, dysuria and frequency.   Neurological:  Positive for weakness. Negative for aphonia, disturbances in coordination and dizziness.   Psychiatric/Behavioral:  Negative for  "altered mental status, depression, memory loss and substance abuse. The patient does not have insomnia and is not nervous/anxious.        Medications:     Allergies:   Allergies   Allergen Reactions    Cefepime Hives and Anaphylaxis    Bactrim [Sulfamethoxazole-Trimethoprim] Other (See Comments)     \"RENAL FAILURE\"    Vancomycin Itching    Zolpidem Unknown - High Severity     \"makes him crazy\"    Zolpidem Tartrate Unknown - Low Severity and Provider Review Needed    Metronidazole Rash       Current Meds:   Current Facility-Administered Medications:     acetaminophen (TYLENOL) tablet 650 mg, 650 mg, Oral, Q4H PRN **OR** acetaminophen (TYLENOL) suppository 650 mg, 650 mg, Rectal, Q4H PRN, Juan Manuel Page MD    aluminum-magnesium hydroxide-simethicone (MAALOX MAX) 400-400-40 MG/5ML suspension 30 mL, 30 mL, Oral, Q4H PRN, Juan Manuel Page MD    apixaban (ELIQUIS) tablet 5 mg, 5 mg, Oral, BID With Meals, Juan Manuel Page MD    busPIRone (BUSPAR) tablet 10 mg, 10 mg, Oral, Q12H PRN, Juan Manuel Page MD    calcitriol (ROCALTROL) capsule 0.5 mcg, 0.5 mcg, Oral, Daily, Juan Manuel Page MD    carvedilol (COREG) tablet 3.125 mg, 3.125 mg, Oral, BID With Meals, Juan Manuel Page MD    cefTRIAXone (ROCEPHIN) 1,000 mg in sodium chloride 0.9 % 100 mL IVPB, 1,000 mg, Intravenous, Q24H, Juan Manuel Page MD    cholecalciferol (VITAMIN D3) tablet 5,000 Units, 5,000 Units, Oral, Daily, Juan Manuel Page MD    dextrose (D50W) (25 g/50 mL) IV injection 25 g, 25 g, Intravenous, Q15 Min PRN, Juan Manuel Page MD    dextrose (GLUTOSE) oral gel 15 g, 15 g, Oral, Q15 Min PRN, Juan Manuel Page MD    docusate sodium (COLACE) capsule 200 mg, 200 mg, Oral, BID, Juan Manuel Page MD    donepezil (ARICEPT) tablet 10 mg, 10 mg, Oral, Daily, Juan Manuel Page MD    Dulaglutide solution pen-injector 4.5 mg, 4.5 mg, Subcutaneous, Weekly, Juan Manuel Page MD    glucagon (GLUCAGEN) " injection 1 mg, 1 mg, Intramuscular, Q15 Min PRN, Juan Manuel Page MD    HYDROmorphone (DILAUDID) injection 1 mg, 1 mg, Intravenous, Q6H PRN, Juan Manuel Page MD    insulin detemir (LEVEMIR) injection 20 Units, 20 Units, Subcutaneous, Daily, Juan Manuel Page MD    Insulin Lispro (humaLOG) injection 2-9 Units, 2-9 Units, Subcutaneous, 4x Daily AC & at Bedtime, Juan Manuel Page MD    Lidocaine HCl 4 % gel gel 1 application , 1 application , Topical, PRN, Juan Manuel Page MD    magnesium hydroxide (MILK OF MAGNESIA) 400 MG/5ML suspension 15 mL, 15 mL, Oral, Daily PRN, Juan Manuel Page MD    melatonin tablet 6 mg, 6 mg, Oral, Nightly, Shruthi Isabel, APRN    metoclopramide (REGLAN) tablet 5 mg, 5 mg, Oral, TID AC, Juan Manuel Page MD    ondansetron (ZOFRAN) tablet 4 mg, 4 mg, Oral, Q6H PRN **OR** ondansetron (ZOFRAN) injection 4 mg, 4 mg, Intravenous, Q6H PRN, Juan Manuel Page MD    oxyCODONE (oxyCONTIN) 12 hr tablet 10 mg, 10 mg, Oral, Q12H, Juan Manuel Page MD    oxyCODONE (ROXICODONE) immediate release tablet 5 mg, 5 mg, Oral, Q4H PRN, Juan Manuel Page MD    pantoprazole (PROTONIX) EC tablet 40 mg, 40 mg, Oral, BID, Juan Manuel Page MD    polyethylene glycol (MIRALAX) packet 17 g, 17 g, Oral, Daily, Juan Manuel Page MD    pregabalin (LYRICA) capsule 100 mg, 100 mg, Oral, Daily, Juan Manuel Page MD    pregabalin (LYRICA) capsule 200 mg, 200 mg, Oral, Nightly, Juan Manuel Page MD    rosuvastatin (CRESTOR) tablet 40 mg, 40 mg, Oral, Nightly, Juan Manuel Page MD    saccharomyces boulardii (FLORASTOR) capsule 250 mg, 250 mg, Oral, BID, Juan Manuel Page MD    sennosides-docusate (PERICOLACE) 8.6-50 MG per tablet 1 tablet, 1 tablet, Oral, Nightly, Juan Manuel Page MD    sodium chloride 0.9 % flush 10 mL, 10 mL, Intravenous, Q12H, Juan Manuel Page MD    sodium chloride 0.9 % flush 10 mL, 10 mL, Intravenous, PRN,  Juan Manuel Page MD    sucralfate (CARAFATE) tablet 1 g, 1 g, Oral, TID AC, Juan Manuel Page MD    tamsulosin (FLOMAX) 24 hr capsule 0.4 mg, 0.4 mg, Oral, Daily, Juan Manuel Page MD    valsartan (DIOVAN) tablet 40 mg, 40 mg, Oral, Nightly, Juan Manuel Page MD    Data:     Code Status:    There are no questions and answers to display.       Family History   Problem Relation Age of Onset    Colon cancer Father     Heart disease Father     Colon cancer Sister     Colon polyps Sister     Alzheimer's disease Mother     Coronary artery disease Sister     Coronary artery disease Sister        Social History     Socioeconomic History    Marital status:    Tobacco Use    Smoking status: Former     Types: Cigarettes     Quit date:      Years since quittin.7    Smokeless tobacco: Never    Tobacco comments:     smoked in [employer]   Vaping Use    Vaping Use: Never used   Substance and Sexual Activity    Alcohol use: No    Drug use: No    Sexual activity: Defer       Vitals:  T 97.5 P 63 R 20 Bp 126/69 Sp02 98% (room air)            I/O (24Hr):  No intake or output data in the 24 hours ending 23 0928    Labs and imaging:      Recent Results (from the past 12 hour(s))   High Sensitivity Troponin T 2Hr    Collection Time: 23  5:14 AM    Specimen: Blood   Result Value Ref Range    HS Troponin T 43 (H) <15 ng/L    Troponin T Delta -13 (L) >=-4 - <+4 ng/L   POC Glucose Once    Collection Time: 23  7:47 AM    Specimen: Blood   Result Value Ref Range    Glucose 153 (H) 70 - 130 mg/dL                               Physical Examination:        Physical Exam  Vitals and nursing note reviewed.   Constitutional:       Appearance: Normal appearance. He is well-developed. He is obese.   HENT:      Head: Normocephalic and atraumatic.      Right Ear: External ear normal.      Left Ear: External ear normal.      Nose: Nose normal.      Mouth/Throat:      Mouth: Mucous membranes are  moist.      Pharynx: Oropharynx is clear.   Eyes:      Pupils: Pupils are equal, round, and reactive to light.   Cardiovascular:      Rate and Rhythm: Normal rate. Rhythm irregular.      Heart sounds: Normal heart sounds.   Pulmonary:      Effort: Pulmonary effort is normal.      Breath sounds: Normal breath sounds.   Abdominal:      General: Bowel sounds are normal.      Palpations: Abdomen is soft.      Comments: obese   Musculoskeletal:         General: Normal range of motion.      Cervical back: Normal range of motion and neck supple.      Comments: Generalized weakness   Skin:     General: Skin is warm and dry.      Comments: Wound vac intact   Neurological:      Mental Status: He is alert and oriented to person, place, and time.      Deep Tendon Reflexes: Reflexes are normal and symmetric.   Psychiatric:         Behavior: Behavior normal.         Assessment:            * No active hospital problems. *    Past Medical History:   Diagnosis Date    Arthritis     Autonomic disease     CAD (coronary artery disease) 02/06/2017    Cervical radiculopathy 09/16/2021    Chronic constipation with acute exaccerbation 05/10/2021    Coronary artery disease     Degeneration of cervical intervertebral disc 08/11/2021    Diabetes mellitus     Diabetic foot ulcer 08/31/2020    Diabetic polyneuropathy associated with type 2 diabetes mellitus 01/18/2021    Elevated cholesterol     Gastroesophageal reflux disease 05/13/2019    Headache     HTN (hypertension), benign 05/03/2017    Hyperlipidemia     Hypertension     Mixed hyperlipidemia 02/07/2017    Multiple lung nodules 01/26/2020    5mm, 9 mm RLL identified 1/2020, not present 10/2019.    Myocardial infarction     Osteomyelitis 01/22/2020    Osteomyelitis of fifth toe of right foot 10/07/2019    Pancreatitis     Persistent insomnia 01/20/2020    Renal disorder     Sleep apnea 02/06/2017    Sleep apnea with use of continuous positive airway pressure (CPAP)     NON-COMPLIANT     Slow transit constipation 01/16/2019    Spinal stenosis in cervical region 09/16/2021    Vitamin D deficiency 03/02/2021        Plan:        Proteus wound infection s/p debridement  New onset atrial fibrillation  Acute on chronic diastolic CHF  CKD3  Multifactorial anemia  DM2 with hyperglycemia on long term insulin  BRITTNI  Chronic pain syndrome  Chronic neck pain with radiculopathy  Diabetic peripheral neuropathy  Hx CAD  GERD  Possible gastroparesis  Sepsis secondary to wound infection  Continue current treatment. Monitor counts. Increase activity. Labs in am. Wound care per wound care team. Continue IV antibiotics. Appreciate nephrology evaluation and treatment. Continue glycemic control efforts. Maintain patient safety. Continue pain control efforts.       Electronically signed by SUSAN Muñoz on 9/27/2023 at 09:28 CDT     I have discussed the care of Erick Luong, including pertinent history and exam findings, with the nurse practitioner.    I have seen and examined the patient and the key elements of all parts of the encounter have been performed by me.  I agree with the assessment, plan and orders as documented by SUSAN Haney, after I modified the exam findings and the plan of treatments and the final version is my approved version of the assessment.        Electronically signed by Juan Manuel Page MD on 9/27/2023 at 11:00 CDT

## 2023-09-28 LAB
ANION GAP SERPL CALCULATED.3IONS-SCNC: 10 MMOL/L (ref 5–15)
BASOPHILS # BLD AUTO: 0.07 10*3/MM3 (ref 0–0.2)
BASOPHILS NFR BLD AUTO: 1.1 % (ref 0–1.5)
BUN SERPL-MCNC: 29 MG/DL (ref 8–23)
BUN/CREAT SERPL: 13.9 (ref 7–25)
CALCIUM SPEC-SCNC: 9 MG/DL (ref 8.6–10.5)
CHLORIDE SERPL-SCNC: 103 MMOL/L (ref 98–107)
CO2 SERPL-SCNC: 24 MMOL/L (ref 22–29)
CREAT SERPL-MCNC: 2.09 MG/DL (ref 0.76–1.27)
CRP SERPL-MCNC: 1.53 MG/DL (ref 0–0.5)
DEPRECATED RDW RBC AUTO: 51.6 FL (ref 37–54)
EGFRCR SERPLBLD CKD-EPI 2021: 34.1 ML/MIN/1.73
EOSINOPHIL # BLD AUTO: 0.92 10*3/MM3 (ref 0–0.4)
EOSINOPHIL NFR BLD AUTO: 14.2 % (ref 0.3–6.2)
ERYTHROCYTE [DISTWIDTH] IN BLOOD BY AUTOMATED COUNT: 15.7 % (ref 12.3–15.4)
ERYTHROCYTE [SEDIMENTATION RATE] IN BLOOD: 71 MM/HR (ref 0–20)
GLUCOSE BLDC GLUCOMTR-MCNC: 142 MG/DL (ref 70–130)
GLUCOSE BLDC GLUCOMTR-MCNC: 208 MG/DL (ref 70–130)
GLUCOSE BLDC GLUCOMTR-MCNC: 291 MG/DL (ref 70–130)
GLUCOSE BLDC GLUCOMTR-MCNC: 293 MG/DL (ref 70–130)
GLUCOSE SERPL-MCNC: 384 MG/DL (ref 65–99)
HCT VFR BLD AUTO: 30.8 % (ref 37.5–51)
HGB BLD-MCNC: 9.2 G/DL (ref 13–17.7)
IMM GRANULOCYTES # BLD AUTO: 0.02 10*3/MM3 (ref 0–0.05)
IMM GRANULOCYTES NFR BLD AUTO: 0.3 % (ref 0–0.5)
LYMPHOCYTES # BLD AUTO: 1.57 10*3/MM3 (ref 0.7–3.1)
LYMPHOCYTES NFR BLD AUTO: 24.2 % (ref 19.6–45.3)
MCH RBC QN AUTO: 27 PG (ref 26.6–33)
MCHC RBC AUTO-ENTMCNC: 29.9 G/DL (ref 31.5–35.7)
MCV RBC AUTO: 90.3 FL (ref 79–97)
MONOCYTES # BLD AUTO: 0.61 10*3/MM3 (ref 0.1–0.9)
MONOCYTES NFR BLD AUTO: 9.4 % (ref 5–12)
NEUTROPHILS NFR BLD AUTO: 3.3 10*3/MM3 (ref 1.7–7)
NEUTROPHILS NFR BLD AUTO: 50.8 % (ref 42.7–76)
NRBC BLD AUTO-RTO: 0 /100 WBC (ref 0–0.2)
PLATELET # BLD AUTO: 260 10*3/MM3 (ref 140–450)
PMV BLD AUTO: 12.3 FL (ref 6–12)
POTASSIUM SERPL-SCNC: 4.7 MMOL/L (ref 3.5–5.2)
RBC # BLD AUTO: 3.41 10*6/MM3 (ref 4.14–5.8)
SODIUM SERPL-SCNC: 137 MMOL/L (ref 136–145)
WBC NRBC COR # BLD: 6.49 10*3/MM3 (ref 3.4–10.8)

## 2023-09-28 PROCEDURE — 63710000001 INSULIN DETEMIR PER 5 UNITS: Performed by: INTERNAL MEDICINE

## 2023-09-28 PROCEDURE — 25010000002 CEFTRIAXONE PER 250 MG: Performed by: INTERNAL MEDICINE

## 2023-09-28 PROCEDURE — 80048 BASIC METABOLIC PNL TOTAL CA: CPT | Performed by: INTERNAL MEDICINE

## 2023-09-28 PROCEDURE — 25010000002 HYDROMORPHONE 1 MG/ML SOLUTION: Performed by: INTERNAL MEDICINE

## 2023-09-28 PROCEDURE — 82948 REAGENT STRIP/BLOOD GLUCOSE: CPT

## 2023-09-28 PROCEDURE — 85652 RBC SED RATE AUTOMATED: CPT | Performed by: INTERNAL MEDICINE

## 2023-09-28 PROCEDURE — 63710000001 INSULIN LISPRO (HUMAN) PER 5 UNITS: Performed by: INTERNAL MEDICINE

## 2023-09-28 PROCEDURE — 97116 GAIT TRAINING THERAPY: CPT

## 2023-09-28 PROCEDURE — 99222 1ST HOSP IP/OBS MODERATE 55: CPT | Performed by: NURSE PRACTITIONER

## 2023-09-28 PROCEDURE — 97530 THERAPEUTIC ACTIVITIES: CPT

## 2023-09-28 PROCEDURE — 85025 COMPLETE CBC W/AUTO DIFF WBC: CPT | Performed by: INTERNAL MEDICINE

## 2023-09-28 PROCEDURE — 97535 SELF CARE MNGMENT TRAINING: CPT

## 2023-09-28 PROCEDURE — 86140 C-REACTIVE PROTEIN: CPT | Performed by: INTERNAL MEDICINE

## 2023-09-28 PROCEDURE — 97110 THERAPEUTIC EXERCISES: CPT

## 2023-09-28 RX ORDER — ISOSORBIDE MONONITRATE 30 MG/1
30 TABLET, EXTENDED RELEASE ORAL
Status: DISCONTINUED | OUTPATIENT
Start: 2023-09-28 | End: 2023-10-11 | Stop reason: HOSPADM

## 2023-09-28 NOTE — PROGRESS NOTES
Nephrology ([facility]) Progress Note      Patient:  Erick Luong  YOB: 1955  Date of Service: 9/28/2023  MRN: [id number]   Acct: [id number]   Primary Care Physician: Del Shetty MD  Advance Directive:   There are no questions and answers to display.     Admit Date: 9/18/2023       Hospital Day: 0  Referring Provider: Juan Manuel Page MD      Patient personally seen and examined.  Complete chart including Consults, Notes, Operative Reports, Labs, Cardiology, and Radiology studies reviewed as able.        Subjective:  Erick Luong is a 68 y.o. male for whom we were consulted for evaluation and treatment of chronic kidney disease stage IIIb.  He has stage IIIb chronic kidney disease baseline and follows with Dr. Lomas in the office as he has diabetic nephropathy.  He has a history of insulin-dependent type 2 diabetes, hypertension, abdominal obesity, obstructive sleep apnea, diabetic foot ulcer and coronary artery disease.  [facility]  [place] [facility] [place] and extensive debridement of left foot wound at plantar aspect.  Postoperative wound measuring 6 x 17 x 4 cm with exposed bone.  Surgical culture returned positive for Proteus Mirabills.  Patient needed long-term IV antibiotics.  He is now admitted for wound care, long-term IV antibiotics, chronic kidney disease and treatment of diastolic congestive heart failure.  Patient also has history of gastroparesis.      Today, no overnight events.  Feels edema has been much improved this admission.  Still with some weakness and chest pain.    Temp- 97.5  Pulse- 76  Resp- 22  BP- 146/78  O2%- 99      Allergies:  Cefepime, Bactrim [sulfamethoxazole-trimethoprim], Vancomycin, Zolpidem, Zolpidem tartrate, and Metronidazole    Home Meds:  Medications Prior to Admission   Medication Sig Dispense Refill Last Dose    amitriptyline (ELAVIL) 25 MG tablet  Take 2 tablets by mouth Daily at bedtime. 60 tablet 1     amoxicillin-clavulanate (Augmentin) 500-125 MG per tablet Take 1 tablet by mouth every 12 hours with meals for 7 days. 14 tablet 0     ascorbic acid (VITAMIN C) 1000 MG tablet Take 1 tablet by mouth Daily. 30 tablet 3     Aspirin 81 MG capsule Take 81 mg by mouth Daily.       bumetanide (BUMEX) 2 MG tablet Take 1 tablet by mouth Daily. Take 2 tablets in morning;1 tablet at night       busPIRone (BUSPAR) 10 MG tablet Take 1 tablet by mouth 2 (Two) Times a Day As Needed. 100 tablet 3     calcitriol (ROCALTROL) 0.5 MCG capsule Take 1 capsule by mouth Daily. 90 capsule 4     carvedilol (COREG) 6.25 MG tablet Take 1 tablet by mouth 2 (Two) Times a Day. 180 tablet 4     Cholecalciferol (D-5000) 125 MCG (5000 UT) tablet Take 1 tablet by mouth Daily Before Lunch. 30 tablet 2     cloNIDine (CATAPRES) 0.1 MG tablet Take 1 tablet by mouth Every 12 (Twelve) Hours. 60 tablet 2     Diclofenac Sodium (VOLTAREN) 1 % gel gel Apply 2 g topically to the appropriate area as directed 4 (Four) Times a Day As Needed. 300 g 11     donepezil (ARICEPT) 10 MG tablet Take 1 tablet by mouth Daily. 90 tablet 4     Dulaglutide (Trulicity) 4.5 MG/0.5ML solution pen-injector Inject 0.5 mL under the skin into the appropriate area as directed 1 (One) Time Per Week. 2 mL 11     empagliflozin (JARDIANCE) 25 MG tablet tablet Take 1 tablet by mouth Daily. 30 tablet 2     HYDROcodone-acetaminophen (NORCO) 7.5-325 MG per tablet Take 1 tablet by mouth 2 (Two) Times a Day As Needed. 40 tablet 0     Insulin Regular Human, Conc, (HumuLIN R U-500 KwikPen) 500 UNIT/ML solution pen-injector CONCENTRATED injection Inject 120 Units under the skin into the appropriate area as directed 2 (Two) Times a Day with breakfast and dinner. (Patient taking differently: Inject 120 Units under the skin into the appropriate area as directed 3 (Three) Times a Day Before Meals.) 18 mL 5     midodrine (PROAMATINE) 10 MG  tablet Take 1 tablet by mouth 3 (Three) Times a Day. 270 tablet 4     ondansetron ODT (ZOFRAN-ODT) 8 MG disintegrating tablet Place 1 tablet under tongue 3 times a day as needed. 25 tablet 1     pantoprazole (Protonix) 40 MG EC tablet Take 1 tablet by mouth 2 (Two) Times a Day. 180 tablet 4     polyethylene glycol (MIRALAX) 17 g packet Take 17 g by mouth Daily. Obtain OTC       pregabalin (LYRICA) 100 MG capsule Take 1 capsule by mouth Daily With Breakfast AND 2 capsules Every Night. 90 capsule 2     rosuvastatin (CRESTOR) 40 MG tablet Take 1 tablet by mouth Every Night. 90 tablet 3     sennosides-docusate (PERICOLACE) 8.6-50 MG per tablet Take 1 tablet by mouth Every Night. Obtain OTC       sennosides-docusate (PERICOLACE) 8.6-50 MG per tablet Take 1 tablet by mouth every night at bedtime. 30 tablet 2     sodium hypochlorite (DAKIN'S 1/4 STRENGTH) 0.125 % solution topical solution 0.125% Apply to affected area twice daily 473 mL 3     tamsulosin (FLOMAX) 0.4 MG capsule 24 hr capsule Take 1 capsule by mouth Daily. 90 capsule 1        Medicines:  Current Facility-Administered Medications   Medication Dose Route Frequency Provider Last Rate Last Admin    acetaminophen (TYLENOL) tablet 650 mg  650 mg Oral Q4H PRN Juan Manuel Page MD        Or    acetaminophen (TYLENOL) suppository 650 mg  650 mg Rectal Q4H PRN Juan Manuel Page MD        aluminum-magnesium hydroxide-simethicone (MAALOX MAX) 400-400-40 MG/5ML suspension 30 mL  30 mL Oral Q4H PRN Juan Manuel Page MD        apixaban (ELIQUIS) tablet 5 mg  5 mg Oral BID With Meals Juan Manuel Page MD        busPIRone (BUSPAR) tablet 10 mg  10 mg Oral Q12H PRN Juan Manuel Page MD        calcitriol (ROCALTROL) capsule 0.5 mcg  0.5 mcg Oral Daily Juan Manuel Page MD        carvedilol (COREG) tablet 3.125 mg  3.125 mg Oral BID With Meals Juan Manuel Page MD        cefTRIAXone (ROCEPHIN) 1,000 mg in sodium chloride 0.9 % 100 mL IVPB   1,000 mg Intravenous Q24H Juan Manuel Page MD        cholecalciferol (VITAMIN D3) tablet 5,000 Units  5,000 Units Oral Daily Juan Manuel Page MD        dextrose (D50W) (25 g/50 mL) IV injection 25 g  25 g Intravenous Q15 Min PRN Juan Manuel Page MD        dextrose (GLUTOSE) oral gel 15 g  15 g Oral Q15 Min PRN Juan Manuel Page MD        docusate sodium (COLACE) capsule 200 mg  200 mg Oral BID Juan Manuel Page MD        donepezil (ARICEPT) tablet 10 mg  10 mg Oral Daily uJan Manuel Page MD        Dulaglutide solution pen-injector 4.5 mg  4.5 mg Subcutaneous Weekly Juan Manuel Page MD        glucagon (GLUCAGEN) injection 1 mg  1 mg Intramuscular Q15 Min PRN Juan Manuel Page MD        HYDROmorphone (DILAUDID) injection 1 mg  1 mg Intravenous Q6H PRN Juan Manuel Page MD        insulin detemir (LEVEMIR) injection 20 Units  20 Units Subcutaneous Daily Juan Manuel Page MD        Insulin Lispro (humaLOG) injection 2-9 Units  2-9 Units Subcutaneous 4x Daily AC & at Bedtime Juan Manuel Page MD        isosorbide mononitrate (IMDUR) 24 hr tablet 30 mg  30 mg Oral Q24H Joanie Mendes APRN        Lidocaine HCl 4 % gel gel 1 application   1 application  Topical PRN Juan Manuel Page MD        magnesium hydroxide (MILK OF MAGNESIA) 400 MG/5ML suspension 15 mL  15 mL Oral Daily PRN Juan Manuel Page MD        melatonin tablet 6 mg  6 mg Oral Nightly Shruthi Isabel APRN        metoclopramide (REGLAN) tablet 5 mg  5 mg Oral TID AC Juan Manuel Page MD        ondansetron (ZOFRAN) tablet 4 mg  4 mg Oral Q6H PRN Juan Manuel Page MD        Or    ondansetron (ZOFRAN) injection 4 mg  4 mg Intravenous Q6H PRN Juan Manuel Page MD        oxyCODONE (oxyCONTIN) 12 hr tablet 10 mg  10 mg Oral Q12H Juan Manuel Page MD        oxyCODONE (ROXICODONE) immediate release tablet 5 mg  5 mg Oral Q4H PRN Juan Manuel Page MD        pantoprazole  (PROTONIX) EC tablet 40 mg  40 mg Oral BID Juan Manuel Page MD        polyethylene glycol (MIRALAX) packet 17 g  17 g Oral Daily Juan Manuel Page MD        pregabalin (LYRICA) capsule 100 mg  100 mg Oral Daily Juan Manuel Page MD        pregabalin (LYRICA) capsule 200 mg  200 mg Oral Nightly Juan Manuel Page MD        rosuvastatin (CRESTOR) tablet 40 mg  40 mg Oral Nightly Juan Manuel Page MD        saccharomyces boulardii (FLORASTOR) capsule 250 mg  250 mg Oral BID Juan Manuel Page MD        sennosides-docusate (PERICOLACE) 8.6-50 MG per tablet 1 tablet  1 tablet Oral Nightly Juan Manuel Page MD        sodium chloride 0.9 % flush 10 mL  10 mL Intravenous Q12H Juan Manuel Page MD        sodium chloride 0.9 % flush 10 mL  10 mL Intravenous PRN Juan Manuel Page MD        sucralfate (CARAFATE) tablet 1 g  1 g Oral TID AC Juan Manuel Page MD        tamsulosin (FLOMAX) 24 hr capsule 0.4 mg  0.4 mg Oral Daily Juan Manuel Page MD        valsartan (DIOVAN) tablet 40 mg  40 mg Oral Nightly Juan Manuel Page MD           Past Medical History:  Past Medical History:   Diagnosis Date    Arthritis     Autonomic disease     CAD (coronary artery disease) 02/06/2017    Cervical radiculopathy 09/16/2021    Chronic constipation with acute exaccerbation 05/10/2021    Coronary artery disease     Degeneration of cervical intervertebral disc 08/11/2021    Diabetes mellitus     Diabetic foot ulcer 08/31/2020    Diabetic polyneuropathy associated with type 2 diabetes mellitus 01/18/2021    Elevated cholesterol     Gastroesophageal reflux disease 05/13/2019    Headache     HTN (hypertension), benign 05/03/2017    Hyperlipidemia     Hypertension     Mixed hyperlipidemia 02/07/2017    Multiple lung nodules 01/26/2020    5mm, 9 mm RLL identified 1/2020, not present 10/2019.    Myocardial infarction     Osteomyelitis 01/22/2020    Osteomyelitis of fifth toe of right foot  10/07/2019    Pancreatitis     Persistent insomnia 01/20/2020    Renal disorder     Sleep apnea 02/06/2017    Sleep apnea with use of continuous positive airway pressure (CPAP)     NON-COMPLIANT    Slow transit constipation 01/16/2019    Spinal stenosis in cervical region 09/16/2021    Vitamin D deficiency 03/02/2021       Past Surgical History:  Past Surgical History:   Procedure Laterality Date    ABDOMINAL SURGERY      AMPUTATION FOOT / TOE Left 10/2021    5th digit     ANTERIOR CERVICAL DISCECTOMY W/ FUSION N/A 8/5/2022    Procedure: CERVICAL DISCECTOMY ANTERIOR WITH FUSION C5-6 with possible plating of C5-7 with neuromonitoring and 1 c-arm;  Surgeon: Karel Soliz MD;  Location:  [facility] OR;  Service: Neurosurgery;  Laterality: N/A;    APPENDECTOMY      BACK SURGERY      CARDIAC CATHETERIZATION Left 02/08/2021    Procedure: Left Heart Cath w poss intervention left anatomical snuff box acess;  Surgeon: Omkar Charles DO;  Location:  [facility] CATH INVASIVE LOCATION;  Service: Cardiology;  Laterality: Left;    CARDIAC SURGERY      CATARACT EXTRACTION      CERVICAL SPINE SURGERY      COLONOSCOPY N/A 01/31/2017    Normal exam repeat in 5 years    COLONOSCOPY N/A 02/11/2019    Mild acute inflammation    COLONOSCOPY W/ POLYPECTOMY  03/04/2014    Hyperplastic polyp    CORONARY ARTERY BYPASS GRAFT  10/2015    ENDOSCOPY  04/13/2011    Gastritis with hemorrhage    ENDOSCOPY N/A 05/05/2017    Normal exam    ENDOSCOPY N/A 02/11/2019    Gastritis    ENDOSCOPY N/A 09/01/2020    Non-erosive gastritis with hemorrhage    ENDOSCOPY N/A 02/10/2021    Esophagitis    FOOT SURGERY Left     INCISION AND DRAINAGE OF WOUND Left 09/2007    spider bite       Family History  Family History   Problem Relation Age of Onset    Colon cancer Father     Heart disease Father     Colon cancer Sister     Colon polyps Sister     Alzheimer's disease Mother     Coronary artery disease Sister     Coronary artery disease Sister        Social  History  Social History     Socioeconomic History    Marital status:    Tobacco Use    Smoking status: Former     Types: Cigarettes     Quit date:      Years since quittin.7    Smokeless tobacco: Never    Tobacco comments:     smoked in highschool   Vaping Use    Vaping Use: Never used   Substance and Sexual Activity    Alcohol use: No    Drug use: No    Sexual activity: Defer       Review of Systems:  History obtained from chart review and the patient  General ROS: No fever or chills  Respiratory ROS: No cough, shortness of breath, wheezing  Cardiovascular ROS: No chest pain or palpitations  Gastrointestinal ROS: No abdominal pain or melena  Genito-Urinary ROS: No dysuria or hematuria  Psych ROS: No anxiety and depression  14 point ROS reviewed with the patient and negative except as noted above and in the HPI unless unable to obtain.    Objective:  No data found.    No intake or output data in the 24 hours ending 23 1416  General: awake/alert   Chest:  clear to auscultation bilaterally without respiratory distress  CVS: regular rate and rhythm  Abdominal: soft, nontender, positive bowel sounds  Extremities: lle edema  Skin: lle wound vac, warm/dry      Labs:  Results from last 7 days   Lab Units 23  0530 23  0627 23  0603   WBC 10*3/mm3 6.49 6.70 10.41   HEMOGLOBIN g/dL 9.2* 9.1* 8.7*   HEMATOCRIT % 30.8* 30.3* 28.6*   PLATELETS 10*3/mm3 260 288 330         Results from last 7 days   Lab Units 23  0530 23  0627 23  0627   SODIUM mmol/L 137 141 139   POTASSIUM mmol/L 4.7 4.6 4.2   CHLORIDE mmol/L 103 104 103   CO2 mmol/L 24.0 26.0 26.0   BUN mg/dL 29* 32* 35*   CREATININE mg/dL 2.09* 2.19* 2.84*   CALCIUM mg/dL 9.0 9.4 8.8   EGFR mL/min/1.73 34.1* 32.2* 23.6*   GLUCOSE mg/dL 384* 93 90       Radiology:   Imaging Results (Last 72 Hours)       ** No results found for the last 72 hours. **            Culture:  No results found for: BLOODCX, URINECX, WOUNDCX,  MRSACX, RESPCX, STOOLCX      Assessment   Acute kidney injury/ATN  Chronic kidney disease stage IIIb  Diabetes type 2 with diabetic nephropathy  Diabetic foot ulcer with surgical debridement and wound VAC  Chronic diastolic congestive heart failure  Wound infection with Proteus  Gastroparesis  Secondary hyperparathyroidism  Anemia and chronic kidney disease  Chest Pain    Plan:  Discussed with patient, nursing, family, primary  Work-up reviewed today  Monitor labs per LTACH schedule  Calcitriol  Continue to monitor renal function has improved 9/25 and 9/28 with adjustments in medication      Willy Arteaga MD  9/28/2023  14:16 CDT

## 2023-09-28 NOTE — PROGRESS NOTES
[facility]  NIMESH Parkinson APRN        Internal Medicine Progress Note    9/28/2023   12:30 CDT    Name:  Erick Luong  MRN:    [id number]     Acct:     [id number]   Room:  [address] [place] [address] Day: 0     Admit Date: 9/18/2023  6:30 PM  PCP: Del Shetty MD    Subjective:     C/C: weakness, fatigue    Interval History: Status:  improved. Up to chair. Wife at bedside. Afebrile. 02 sats stable on room air. Blood sugars elevated. Increased left foot pain following wound care with wound vac change earlier today. Renal function improved. Counts otherwise stable.     Review of Systems   Constitutional: Positive for malaise/fatigue. Negative for chills, decreased appetite, weight gain and weight loss.   HENT:  Negative for congestion, ear discharge, hoarse voice and tinnitus.    Eyes:  Negative for blurred vision, discharge, visual disturbance and visual halos.   Cardiovascular:  Negative for chest pain, claudication, dyspnea on exertion, irregular heartbeat, leg swelling, orthopnea and paroxysmal nocturnal dyspnea.   Respiratory:  Negative for cough, shortness of breath, sputum production and wheezing.    Endocrine: Negative for cold intolerance, heat intolerance and polyuria.   Hematologic/Lymphatic: Negative for adenopathy. Does not bruise/bleed easily.   Skin:  Positive for poor wound healing. Negative for dry skin, itching and suspicious lesions.   Musculoskeletal:  Positive for neck pain. Negative for arthritis, back pain, falls, joint pain, muscle weakness and myalgias.   Gastrointestinal:  Negative for abdominal pain, constipation, diarrhea, dysphagia and hematemesis.   Genitourinary:  Negative for bladder incontinence, dysuria and frequency.   Neurological:  Positive for weakness. Negative for aphonia, disturbances in coordination and dizziness.   Psychiatric/Behavioral:  Negative for altered mental status, depression, memory loss and substance abuse. The patient does not have insomnia  "and is not nervous/anxious.        Medications:     Allergies:   Allergies   Allergen Reactions    Cefepime Hives and Anaphylaxis    Bactrim [Sulfamethoxazole-Trimethoprim] Other (See Comments)     \"RENAL FAILURE\"    Vancomycin Itching    Zolpidem Unknown - High Severity     \"makes him crazy\"    Zolpidem Tartrate Unknown - Low Severity and Provider Review Needed    Metronidazole Rash       Current Meds:   Current Facility-Administered Medications:     acetaminophen (TYLENOL) tablet 650 mg, 650 mg, Oral, Q4H PRN **OR** acetaminophen (TYLENOL) suppository 650 mg, 650 mg, Rectal, Q4H PRN, Juan Manuel aPge MD    aluminum-magnesium hydroxide-simethicone (MAALOX MAX) 400-400-40 MG/5ML suspension 30 mL, 30 mL, Oral, Q4H PRN, Juan Manuel Page MD    apixaban (ELIQUIS) tablet 5 mg, 5 mg, Oral, BID With Meals, Juan Manuel Page MD    busPIRone (BUSPAR) tablet 10 mg, 10 mg, Oral, Q12H PRN, Juan Manuel Page MD    calcitriol (ROCALTROL) capsule 0.5 mcg, 0.5 mcg, Oral, Daily, Juan Manuel Page MD    carvedilol (COREG) tablet 3.125 mg, 3.125 mg, Oral, BID With Meals, Juan Manuel Page MD    cefTRIAXone (ROCEPHIN) 1,000 mg in sodium chloride 0.9 % 100 mL IVPB, 1,000 mg, Intravenous, Q24H, Juan Manuel Page MD    cholecalciferol (VITAMIN D3) tablet 5,000 Units, 5,000 Units, Oral, Daily, Juan Manuel Page MD    dextrose (D50W) (25 g/50 mL) IV injection 25 g, 25 g, Intravenous, Q15 Min PRN, Juan Manuel Page MD    dextrose (GLUTOSE) oral gel 15 g, 15 g, Oral, Q15 Min PRN, Juan Manuel Page MD    docusate sodium (COLACE) capsule 200 mg, 200 mg, Oral, BID, Juan Manuel Page MD    donepezil (ARICEPT) tablet 10 mg, 10 mg, Oral, Daily, Juan Manuel Page MD    Dulaglutide solution pen-injector 4.5 mg, 4.5 mg, Subcutaneous, Weekly, Juan Manuel Page MD    glucagon (GLUCAGEN) injection 1 mg, 1 mg, Intramuscular, Q15 Min PRN, Juan Manuel Page MD    HYDROmorphone " (DILAUDID) injection 1 mg, 1 mg, Intravenous, Q6H PRN, Juan Manuel Page MD    insulin detemir (LEVEMIR) injection 20 Units, 20 Units, Subcutaneous, Daily, Juan Manuel Page MD    Insulin Lispro (humaLOG) injection 2-9 Units, 2-9 Units, Subcutaneous, 4x Daily AC & at Bedtime, Juan Manuel Page MD    Lidocaine HCl 4 % gel gel 1 application , 1 application , Topical, PRN, Juan Manuel Page MD    magnesium hydroxide (MILK OF MAGNESIA) 400 MG/5ML suspension 15 mL, 15 mL, Oral, Daily PRN, Juan Manuel Page MD    melatonin tablet 6 mg, 6 mg, Oral, Nightly, Shruthi Isabel, SUSAN    metoclopramide (REGLAN) tablet 5 mg, 5 mg, Oral, TID AC, Juan Manuel Page MD    ondansetron (ZOFRAN) tablet 4 mg, 4 mg, Oral, Q6H PRN **OR** ondansetron (ZOFRAN) injection 4 mg, 4 mg, Intravenous, Q6H PRN, Juan Manuel Page MD    oxyCODONE (oxyCONTIN) 12 hr tablet 10 mg, 10 mg, Oral, Q12H, Juan Manuel Page MD    oxyCODONE (ROXICODONE) immediate release tablet 5 mg, 5 mg, Oral, Q4H PRN, Juan Manuel Page MD    pantoprazole (PROTONIX) EC tablet 40 mg, 40 mg, Oral, BID, Juan Manuel Page MD    polyethylene glycol (MIRALAX) packet 17 g, 17 g, Oral, Daily, Juan Manuel Page MD    pregabalin (LYRICA) capsule 100 mg, 100 mg, Oral, Daily, Juan Manuel Page MD    pregabalin (LYRICA) capsule 200 mg, 200 mg, Oral, Nightly, Juan Manuel Page MD    rosuvastatin (CRESTOR) tablet 40 mg, 40 mg, Oral, Nightly, Juan Manuel Page MD    saccharomyces boulardii (FLORASTOR) capsule 250 mg, 250 mg, Oral, BID, Juan Manuel Page MD    sennosides-docusate (PERICOLACE) 8.6-50 MG per tablet 1 tablet, 1 tablet, Oral, Nightly, Juan Manuel Page MD    sodium chloride 0.9 % flush 10 mL, 10 mL, Intravenous, Q12H, Juan Manuel Page MD    sodium chloride 0.9 % flush 10 mL, 10 mL, Intravenous, PRN, Juan Manuel Page MD    sucralfate (CARAFATE) tablet 1 g, 1 g, Oral, TID ACCamilo,  Juan Manuel Dillon MD    tamsulosin (FLOMAX) 24 hr capsule 0.4 mg, 0.4 mg, Oral, Daily, Juan Manuel Page MD    valsartan (DIOVAN) tablet 40 mg, 40 mg, Oral, Nightly, Juan Manuel Page MD    Data:     Code Status:    There are no questions and answers to display.       Family History   Problem Relation Age of Onset    Colon cancer Father     Heart disease Father     Colon cancer Sister     Colon polyps Sister     Alzheimer's disease Mother     Coronary artery disease Sister     Coronary artery disease Sister        Social History     Socioeconomic History    Marital status:    Tobacco Use    Smoking status: Former     Types: Cigarettes     Quit date:      Years since quittin.7    Smokeless tobacco: Never    Tobacco comments:     smoked in [employer]   Vaping Use    Vaping Use: Never used   Substance and Sexual Activity    Alcohol use: No    Drug use: No    Sexual activity: Defer       Vitals:  T 97.5 P 76 R 22 Bp 146/78 Sp02 99% (room air)            I/O (24Hr):  No intake or output data in the 24 hours ending 23 1230    Labs and imaging:      Recent Results (from the past 12 hour(s))   Basic Metabolic Panel    Collection Time: 23  5:30 AM    Specimen: Blood   Result Value Ref Range    Glucose 384 (H) 65 - 99 mg/dL    BUN 29 (H) 8 - 23 mg/dL    Creatinine 2.09 (H) 0.76 - 1.27 mg/dL    Sodium 137 136 - 145 mmol/L    Potassium 4.7 3.5 - 5.2 mmol/L    Chloride 103 98 - 107 mmol/L    CO2 24.0 22.0 - 29.0 mmol/L    Calcium 9.0 8.6 - 10.5 mg/dL    BUN/Creatinine Ratio 13.9 7.0 - 25.0    Anion Gap 10.0 5.0 - 15.0 mmol/L    eGFR 34.1 (L) >60.0 mL/min/1.73   Sedimentation Rate    Collection Time: 23  5:30 AM    Specimen: Blood   Result Value Ref Range    Sed Rate 71 (H) 0 - 20 mm/hr   C-reactive Protein    Collection Time: 23  5:30 AM    Specimen: Blood   Result Value Ref Range    C-Reactive Protein 1.53 (H) 0.00 - 0.50 mg/dL   CBC Auto Differential    Collection Time: 23   5:30 AM    Specimen: Blood   Result Value Ref Range    WBC 6.49 3.40 - 10.80 10*3/mm3    RBC 3.41 (L) 4.14 - 5.80 10*6/mm3    Hemoglobin 9.2 (L) 13.0 - 17.7 g/dL    Hematocrit 30.8 (L) 37.5 - 51.0 %    MCV 90.3 79.0 - 97.0 fL    MCH 27.0 26.6 - 33.0 pg    MCHC 29.9 (L) 31.5 - 35.7 g/dL    RDW 15.7 (H) 12.3 - 15.4 %    RDW-SD 51.6 37.0 - 54.0 fl    MPV 12.3 (H) 6.0 - 12.0 fL    Platelets 260 140 - 450 10*3/mm3    Neutrophil % 50.8 42.7 - 76.0 %    Lymphocyte % 24.2 19.6 - 45.3 %    Monocyte % 9.4 5.0 - 12.0 %    Eosinophil % 14.2 (H) 0.3 - 6.2 %    Basophil % 1.1 0.0 - 1.5 %    Immature Grans % 0.3 0.0 - 0.5 %    Neutrophils, Absolute 3.30 1.70 - 7.00 10*3/mm3    Lymphocytes, Absolute 1.57 0.70 - 3.10 10*3/mm3    Monocytes, Absolute 0.61 0.10 - 0.90 10*3/mm3    Eosinophils, Absolute 0.92 (H) 0.00 - 0.40 10*3/mm3    Basophils, Absolute 0.07 0.00 - 0.20 10*3/mm3    Immature Grans, Absolute 0.02 0.00 - 0.05 10*3/mm3    nRBC 0.0 0.0 - 0.2 /100 WBC   POC Glucose Once    Collection Time: 09/28/23 11:11 AM    Specimen: Blood   Result Value Ref Range    Glucose 293 (H) 70 - 130 mg/dL                               Physical Examination:        Physical Exam  Vitals and nursing note reviewed.   Constitutional:       Appearance: Normal appearance. He is well-developed. He is obese.   HENT:      Head: Normocephalic and atraumatic.      Right Ear: External ear normal.      Left Ear: External ear normal.      Nose: Nose normal.      Mouth/Throat:      Mouth: Mucous membranes are moist.      Pharynx: Oropharynx is clear.   Eyes:      Pupils: Pupils are equal, round, and reactive to light.   Cardiovascular:      Rate and Rhythm: Normal rate. Rhythm irregular.      Heart sounds: Normal heart sounds.   Pulmonary:      Effort: Pulmonary effort is normal.      Breath sounds: Normal breath sounds.   Abdominal:      General: Bowel sounds are normal.      Palpations: Abdomen is soft.      Comments: obese   Musculoskeletal:         General:  Normal range of motion.      Cervical back: Normal range of motion and neck supple.      Comments: Generalized weakness   Skin:     General: Skin is warm and dry.      Comments: Wound vac intact   Neurological:      Mental Status: He is alert and oriented to person, place, and time.      Deep Tendon Reflexes: Reflexes are normal and symmetric.   Psychiatric:         Behavior: Behavior normal.         Assessment:            * No active hospital problems. *    Past Medical History:   Diagnosis Date    Arthritis     Autonomic disease     CAD (coronary artery disease) 02/06/2017    Cervical radiculopathy 09/16/2021    Chronic constipation with acute exaccerbation 05/10/2021    Coronary artery disease     Degeneration of cervical intervertebral disc 08/11/2021    Diabetes mellitus     Diabetic foot ulcer 08/31/2020    Diabetic polyneuropathy associated with type 2 diabetes mellitus 01/18/2021    Elevated cholesterol     Gastroesophageal reflux disease 05/13/2019    Headache     HTN (hypertension), benign 05/03/2017    Hyperlipidemia     Hypertension     Mixed hyperlipidemia 02/07/2017    Multiple lung nodules 01/26/2020    5mm, 9 mm RLL identified 1/2020, not present 10/2019.    Myocardial infarction     Osteomyelitis 01/22/2020    Osteomyelitis of fifth toe of right foot 10/07/2019    Pancreatitis     Persistent insomnia 01/20/2020    Renal disorder     Sleep apnea 02/06/2017    Sleep apnea with use of continuous positive airway pressure (CPAP)     NON-COMPLIANT    Slow transit constipation 01/16/2019    Spinal stenosis in cervical region 09/16/2021    Vitamin D deficiency 03/02/2021        Plan:        Proteus wound infection s/p debridement  New onset atrial fibrillation  Acute on chronic diastolic CHF  CKD3  Multifactorial anemia  DM2 with hyperglycemia on long term insulin  BRITTNI  Chronic pain syndrome  Chronic neck pain with radiculopathy  Diabetic peripheral neuropathy  Hx CAD  GERD  Possible gastroparesis  Sepsis  secondary to wound infection  Continue current treatment. Monitor counts. Increase activity. Labs Monday. Wound care per wound care team. Continue IV antibiotics. Appreciate nephrology evaluation and treatment. Continue glycemic control efforts. Maintain patient safety. Continue pain control efforts. Appreciate cardiology evaluation and recommendations.       Electronically signed by SUSAN Muñoz on 9/28/2023 at 12:30 CDT

## 2023-09-28 NOTE — CONSULTS
Patient Care Team:  Del Shetty MD as PCP - General (Family Medicine)  Emeka Garay MD as Cardiologist (Cardiology)  Cristopher Hannah MD as Consulting Physician (Gastroenterology)  Brian Lomas MD as Consulting Physician (Nephrology)  Karel Soliz MD as Surgeon (Neurosurgery)  Willy Romero APRN as Nurse Practitioner (Nurse Practitioner)  Dougie Taylor MD as Consulting Physician (Pulmonary Disease)  Juan Manuel Page MD  REASON FOR REFERRAL: chest pain, elevated troponin   Chief complaint : left foot wound     Subjective     Patient is a 68 y.o. male presented to the [facility] [place] on the date of [facility] efforts and wound care as well as IV antibiotic therapy for his left foot wound.  Review of records indicates he was treated for sepsis and left foot abscess with gas gangrene.  He underwent debridement on 9/7 and surgical cultures were positive for Proteus Mirabilis.  Shortly after surgery, on 9/8 according to the patient's wife, the patient was diagnosed with atrial fibrillation.  He was started on Eliquis for anticoagulation and Coreg for rate control.  Since transfer [facility] the [facility] he has been rate controlled.  Notes indicate at [facility] he also had some acute congestive heart failure and required diuresis.  There was also concern for gastroparesis and he developed WANDER on CKD stage III.    He follows with Dr. Garay for his cardiology care.  He has a history of coronary artery bypass grafting, chronic noncardiac chest pain, morbid obesity, diabetes mellitus type 2, chronic diastolic CHF and hypertension.  He takes Bumex regularly at home.  His most recent cardiac catheterization February 2021 revealed native multivessel disease with patent bypass grafts and PCI was not needed.  This was performed for an abnormal stress test and chest pain.    Cardiology consultation is obtained at this admission due to  "chest pain.  The patient states he has been having chest pain ever since debridement on 9/7.  He describes it as a \"hard\" pain.  In the past 2 to 3 days he also has radiation to his left arm.  This comes and goes multiple times per day and may last up to an hour.  It improves after about an hour of rest.  He does tell me at least part of this chest discomfort is reproducible to palpation.  He believes he was given sublingual nitroglycerin at [facility] with improvement but he has not required it here.  Troponins here are flat trending around 50.  I do not appreciate ischemic EKG changes.  He remains in rate controlled atrial fibrillation and denies palpitations and appears to be symptomatically unaware.  Diuretics are currently on hold given WANDER.  He denies any shortness of breath.  He states this is unlike any prior chest pain including his symptoms prior to bypass.    Echocardiogram at [facility] on 9/8 indicates his LVEF was 41 to 47%.    Last echocardiogram here on 8/22 revealed an LVEF of 51 to 55%    Review of Systems   Review of Systems   Constitutional:  Positive for fatigue. Negative for diaphoresis, fever and unexpected weight change.   HENT:  Negative for nosebleeds.    Respiratory:  Negative for apnea, cough, chest tightness, shortness of breath and wheezing.    Cardiovascular:  Positive for chest pain and leg swelling. Negative for palpitations.   Gastrointestinal:  Negative for abdominal distention, nausea and vomiting.   Genitourinary:  Negative for hematuria.   Musculoskeletal:  Negative for gait problem.   Skin:  Positive for wound (left foot). Negative for color change.   Neurological:  Positive for weakness. Negative for dizziness, syncope and light-headedness.     History  Past Medical History:   Diagnosis Date    Arthritis     Autonomic disease     CAD (coronary artery disease) 02/06/2017    Cervical radiculopathy 09/16/2021    Chronic constipation with acute exaccerbation 05/10/2021 "    Coronary artery disease     Degeneration of cervical intervertebral disc 08/11/2021    Diabetes mellitus     Diabetic foot ulcer 08/31/2020    Diabetic polyneuropathy associated with type 2 diabetes mellitus 01/18/2021    Elevated cholesterol     Gastroesophageal reflux disease 05/13/2019    Headache     HTN (hypertension), benign 05/03/2017    Hyperlipidemia     Hypertension     Mixed hyperlipidemia 02/07/2017    Multiple lung nodules 01/26/2020    5mm, 9 mm RLL identified 1/2020, not present 10/2019.    Myocardial infarction     Osteomyelitis 01/22/2020    Osteomyelitis of fifth toe of right foot 10/07/2019    Pancreatitis     Persistent insomnia 01/20/2020    Renal disorder     Sleep apnea 02/06/2017    Sleep apnea with use of continuous positive airway pressure (CPAP)     NON-COMPLIANT    Slow transit constipation 01/16/2019    Spinal stenosis in cervical region 09/16/2021    Vitamin D deficiency 03/02/2021     Past Surgical History:   Procedure Laterality Date    ABDOMINAL SURGERY      AMPUTATION FOOT / TOE Left 10/2021    5th digit     ANTERIOR CERVICAL DISCECTOMY W/ FUSION N/A 8/5/2022    Procedure: CERVICAL DISCECTOMY ANTERIOR WITH FUSION C5-6 with possible plating of C5-7 with neuromonitoring and 1 c-arm;  Surgeon: Karel Soliz MD;  Location:  [facility] OR;  Service: Neurosurgery;  Laterality: N/A;    APPENDECTOMY      BACK SURGERY      CARDIAC CATHETERIZATION Left 02/08/2021    Procedure: Left Heart Cath w poss intervention left anatomical snuff box acess;  Surgeon: Omkar Charles DO;  Location:  [facility] CATH INVASIVE LOCATION;  Service: Cardiology;  Laterality: Left;    CARDIAC SURGERY      CATARACT EXTRACTION      CERVICAL SPINE SURGERY      COLONOSCOPY N/A 01/31/2017    Normal exam repeat in 5 years    COLONOSCOPY N/A 02/11/2019    Mild acute inflammation    COLONOSCOPY W/ POLYPECTOMY  03/04/2014    Hyperplastic polyp    CORONARY ARTERY BYPASS GRAFT  10/2015    ENDOSCOPY  04/13/2011     Gastritis with hemorrhage    ENDOSCOPY N/A 2017    Normal exam    ENDOSCOPY N/A 2019    Gastritis    ENDOSCOPY N/A 2020    Non-erosive gastritis with hemorrhage    ENDOSCOPY N/A 02/10/2021    Esophagitis    FOOT SURGERY Left     INCISION AND DRAINAGE OF WOUND Left 2007    spider bite     Family History   Problem Relation Age of Onset    Colon cancer Father     Heart disease Father     Colon cancer Sister     Colon polyps Sister     Alzheimer's disease Mother     Coronary artery disease Sister     Coronary artery disease Sister      Social History     Tobacco Use    Smoking status: Former     Types: Cigarettes     Quit date:      Years since quittin.7    Smokeless tobacco: Never    Tobacco comments:     smoked in [employer]   Vaping Use    Vaping Use: Never used   Substance Use Topics    Alcohol use: No    Drug use: No     Medications Prior to Admission   Medication Sig Dispense Refill Last Dose    amitriptyline (ELAVIL) 25 MG tablet Take 2 tablets by mouth Daily at bedtime. 60 tablet 1     amoxicillin-clavulanate (Augmentin) 500-125 MG per tablet Take 1 tablet by mouth every 12 hours with meals for 7 days. 14 tablet 0     ascorbic acid (VITAMIN C) 1000 MG tablet Take 1 tablet by mouth Daily. 30 tablet 3     Aspirin 81 MG capsule Take 81 mg by mouth Daily.       bumetanide (BUMEX) 2 MG tablet Take 1 tablet by mouth Daily. Take 2 tablets in morning;1 tablet at night       busPIRone (BUSPAR) 10 MG tablet Take 1 tablet by mouth 2 (Two) Times a Day As Needed. 100 tablet 3     calcitriol (ROCALTROL) 0.5 MCG capsule Take 1 capsule by mouth Daily. 90 capsule 4     carvedilol (COREG) 6.25 MG tablet Take 1 tablet by mouth 2 (Two) Times a Day. 180 tablet 4     Cholecalciferol (D-5000) 125 MCG (5000 UT) tablet Take 1 tablet by mouth Daily Before Lunch. 30 tablet 2     cloNIDine (CATAPRES) 0.1 MG tablet Take 1 tablet by mouth Every 12 (Twelve) Hours. 60 tablet 2     Diclofenac Sodium (VOLTAREN) 1 %  gel gel Apply 2 g topically to the appropriate area as directed 4 (Four) Times a Day As Needed. 300 g 11     donepezil (ARICEPT) 10 MG tablet Take 1 tablet by mouth Daily. 90 tablet 4     Dulaglutide (Trulicity) 4.5 MG/0.5ML solution pen-injector Inject 0.5 mL under the skin into the appropriate area as directed 1 (One) Time Per Week. 2 mL 11     empagliflozin (JARDIANCE) 25 MG tablet tablet Take 1 tablet by mouth Daily. 30 tablet 2     HYDROcodone-acetaminophen (NORCO) 7.5-325 MG per tablet Take 1 tablet by mouth 2 (Two) Times a Day As Needed. 40 tablet 0     Insulin Regular Human, Conc, (HumuLIN R U-500 KwikPen) 500 UNIT/ML solution pen-injector CONCENTRATED injection Inject 120 Units under the skin into the appropriate area as directed 2 (Two) Times a Day with breakfast and dinner. (Patient taking differently: Inject 120 Units under the skin into the appropriate area as directed 3 (Three) Times a Day Before Meals.) 18 mL 5     midodrine (PROAMATINE) 10 MG tablet Take 1 tablet by mouth 3 (Three) Times a Day. 270 tablet 4     ondansetron ODT (ZOFRAN-ODT) 8 MG disintegrating tablet Place 1 tablet under tongue 3 times a day as needed. 25 tablet 1     pantoprazole (Protonix) 40 MG EC tablet Take 1 tablet by mouth 2 (Two) Times a Day. 180 tablet 4     polyethylene glycol (MIRALAX) 17 g packet Take 17 g by mouth Daily. Obtain OTC       pregabalin (LYRICA) 100 MG capsule Take 1 capsule by mouth Daily With Breakfast AND 2 capsules Every Night. 90 capsule 2     rosuvastatin (CRESTOR) 40 MG tablet Take 1 tablet by mouth Every Night. 90 tablet 3     sennosides-docusate (PERICOLACE) 8.6-50 MG per tablet Take 1 tablet by mouth Every Night. Obtain OTC       sennosides-docusate (PERICOLACE) 8.6-50 MG per tablet Take 1 tablet by mouth every night at bedtime. 30 tablet 2     sodium hypochlorite (DAKIN'S 1/4 STRENGTH) 0.125 % solution topical solution 0.125% Apply to affected area twice daily 473 mL 3     tamsulosin (FLOMAX) 0.4 MG  capsule 24 hr capsule Take 1 capsule by mouth Daily. 90 capsule 1        Current Facility-Administered Medications:     acetaminophen (TYLENOL) tablet 650 mg, 650 mg, Oral, Q4H PRN **OR** acetaminophen (TYLENOL) suppository 650 mg, 650 mg, Rectal, Q4H PRN, Juan Manuel Page MD    aluminum-magnesium hydroxide-simethicone (MAALOX MAX) 400-400-40 MG/5ML suspension 30 mL, 30 mL, Oral, Q4H PRN, Juan Manuel Page MD    apixaban (ELIQUIS) tablet 5 mg, 5 mg, Oral, BID With Meals, Juan Manuel Page MD    busPIRone (BUSPAR) tablet 10 mg, 10 mg, Oral, Q12H PRN, Juan Manuel Page MD    calcitriol (ROCALTROL) capsule 0.5 mcg, 0.5 mcg, Oral, Daily, Juan Manuel Page MD    carvedilol (COREG) tablet 3.125 mg, 3.125 mg, Oral, BID With Meals, Juan Manuel Page MD    cefTRIAXone (ROCEPHIN) 1,000 mg in sodium chloride 0.9 % 100 mL IVPB, 1,000 mg, Intravenous, Q24H, Juan Manuel Page MD    cholecalciferol (VITAMIN D3) tablet 5,000 Units, 5,000 Units, Oral, Daily, Juan Manuel Page MD    dextrose (D50W) (25 g/50 mL) IV injection 25 g, 25 g, Intravenous, Q15 Min PRN, Juan Manuel Page MD    dextrose (GLUTOSE) oral gel 15 g, 15 g, Oral, Q15 Min PRN, Juan Manuel Page MD    docusate sodium (COLACE) capsule 200 mg, 200 mg, Oral, BID, Juan Manuel Page MD    donepezil (ARICEPT) tablet 10 mg, 10 mg, Oral, Daily, Juan Manuel Page MD    Dulaglutide solution pen-injector 4.5 mg, 4.5 mg, Subcutaneous, Weekly, Juan Manuel Page MD    glucagon (GLUCAGEN) injection 1 mg, 1 mg, Intramuscular, Q15 Min PRN, Juan Manuel Page MD    HYDROmorphone (DILAUDID) injection 1 mg, 1 mg, Intravenous, Q6H PRN, Juan Manuel Page MD    insulin detemir (LEVEMIR) injection 20 Units, 20 Units, Subcutaneous, Daily, Juan Manuel Page MD    Insulin Lispro (humaLOG) injection 2-9 Units, 2-9 Units, Subcutaneous, 4x Daily AC & at Bedtime, Juan Manuel Page MD    Lidocaine HCl 4 % gel gel 1  application , 1 application , Topical, PRN, Juan Manuel Page MD    magnesium hydroxide (MILK OF MAGNESIA) 400 MG/5ML suspension 15 mL, 15 mL, Oral, Daily PRN, Juan Manuel Page MD    melatonin tablet 6 mg, 6 mg, Oral, Nightly, Shruthi Isabel, APRN    metoclopramide (REGLAN) tablet 5 mg, 5 mg, Oral, TID AC, Juan Manuel Page MD    ondansetron (ZOFRAN) tablet 4 mg, 4 mg, Oral, Q6H PRN **OR** ondansetron (ZOFRAN) injection 4 mg, 4 mg, Intravenous, Q6H PRN, Juan Manuel Page MD    oxyCODONE (oxyCONTIN) 12 hr tablet 10 mg, 10 mg, Oral, Q12H, Juan Manuel Page MD    oxyCODONE (ROXICODONE) immediate release tablet 5 mg, 5 mg, Oral, Q4H PRN, Juan Manuel Page MD    pantoprazole (PROTONIX) EC tablet 40 mg, 40 mg, Oral, BID, Juan Manuel Page MD    polyethylene glycol (MIRALAX) packet 17 g, 17 g, Oral, Daily, Juan Manuel Page MD    pregabalin (LYRICA) capsule 100 mg, 100 mg, Oral, Daily, Juan Manuel Page MD    pregabalin (LYRICA) capsule 200 mg, 200 mg, Oral, Nightly, Juan Manuel Page MD    rosuvastatin (CRESTOR) tablet 40 mg, 40 mg, Oral, Nightly, Juan Manuel Page MD    saccharomyces boulardii (FLORASTOR) capsule 250 mg, 250 mg, Oral, BID, Juan Manuel Page MD    sennosides-docusate (PERICOLACE) 8.6-50 MG per tablet 1 tablet, 1 tablet, Oral, Nightly, Juan Manuel Page MD    sodium chloride 0.9 % flush 10 mL, 10 mL, Intravenous, Q12H, Juan Manuel Page MD    sodium chloride 0.9 % flush 10 mL, 10 mL, Intravenous, PRN, Juan Manuel Page MD    sucralfate (CARAFATE) tablet 1 g, 1 g, Oral, TID AC, Juan Manuel Page MD    tamsulosin (FLOMAX) 24 hr capsule 0.4 mg, 0.4 mg, Oral, Daily, Juan Manuel Page MD    valsartan (DIOVAN) tablet 40 mg, 40 mg, Oral, Nightly, Juan Manuel Page MD  Allergies:  Cefepime, Bactrim [sulfamethoxazole-trimethoprim], Vancomycin, Zolpidem, Zolpidem tartrate, and Metronidazole    Objective     Vital Signs        Physical Exam:   Vitals and nursing note reviewed.   Constitutional:       General: Not in acute distress.     Appearance: Well-developed and not in distress. Chronically ill-appearing. Not diaphoretic.   Neck:      Vascular: No JVD.   Pulmonary:      Effort: Pulmonary effort is normal. No respiratory distress.      Breath sounds: Normal breath sounds.   Cardiovascular:      Normal rate. Irregular rhythm.      Murmurs: There is no murmur.   Edema:     Peripheral edema (1-2+ BLE edema) present.  Abdominal:      Tenderness: There is no abdominal tenderness.   Skin:     General: Skin is warm and dry.      Findings: Wound (left foot wound vac in place) present.   Neurological:      Mental Status: Alert and oriented to person, place, and time.     Results Review:     Lab Results (last 72 hours)       Procedure Component Value Units Date/Time    POC Glucose Once [[id number]]  (Abnormal) Collected: 09/27/23 2037    Specimen: Blood Updated: 09/28/23 0744     Glucose 291 mg/dL      Comment: : [email or web address] Willam KayleaMeter ID: [id number]       Basic Metabolic Panel [[id number]]  (Abnormal) Collected: 09/28/23 0530    Specimen: Blood Updated: 09/28/23 0638     Glucose 384 mg/dL      BUN 29 mg/dL      Creatinine 2.09 mg/dL      Sodium 137 mmol/L      Potassium 4.7 mmol/L      Chloride 103 mmol/L      CO2 24.0 mmol/L      Calcium 9.0 mg/dL      BUN/Creatinine Ratio 13.9     Anion Gap 10.0 mmol/L      eGFR 34.1 mL/min/1.73     Narrative:      GFR Normal >60  Chronic Kidney Disease <60  Kidney Failure <15      C-reactive Protein [[id number]]  (Abnormal) Collected: 09/28/23 0530    Specimen: Blood Updated: 09/28/23 0620     C-Reactive Protein 1.53 mg/dL     Sedimentation Rate [[id number]]  (Abnormal) Collected: 09/28/23 0530    Specimen: Blood Updated: 09/28/23 0614     Sed Rate 71 mm/hr     CBC & Differential [[id number]]  (Abnormal) Collected: 09/28/23 0530    Specimen: Blood Updated: 09/28/23 0603    Narrative:      The  following orders were created for panel order CBC & Differential.  Procedure                               Abnormality         Status                     ---------                               -----------         ------                     CBC Auto Differential[[id number]]        Abnormal            Final result                 Please view results for these tests on the individual orders.    CBC Auto Differential [[id number]]  (Abnormal) Collected: 09/28/23 0530    Specimen: Blood Updated: 09/28/23 0603     WBC 6.49 10*3/mm3      RBC 3.41 10*6/mm3      Hemoglobin 9.2 g/dL      Hematocrit 30.8 %      MCV 90.3 fL      MCH 27.0 pg      MCHC 29.9 g/dL      RDW 15.7 %      RDW-SD 51.6 fl      MPV 12.3 fL      Platelets 260 10*3/mm3      Neutrophil % 50.8 %      Lymphocyte % 24.2 %      Monocyte % 9.4 %      Eosinophil % 14.2 %      Basophil % 1.1 %      Immature Grans % 0.3 %      Neutrophils, Absolute 3.30 10*3/mm3      Lymphocytes, Absolute 1.57 10*3/mm3      Monocytes, Absolute 0.61 10*3/mm3      Eosinophils, Absolute 0.92 10*3/mm3      Basophils, Absolute 0.07 10*3/mm3      Immature Grans, Absolute 0.02 10*3/mm3      nRBC 0.0 /100 WBC     POC Glucose Once [[id number]]  (Abnormal) Collected: 09/27/23 2215    Specimen: Blood Updated: 09/27/23 2226     Glucose 290 mg/dL      Comment: : johanna GarciayleaMeter ID: [id number]       POC Glucose Once [[id number]]  (Abnormal) Collected: 09/27/23 2035    Specimen: Blood Updated: 09/27/23 2048     Glucose 459 mg/dL      Comment: : johanna GarciayleaMeter ID: [id number]       POC Glucose Once [[id number]]  (Abnormal) Collected: 09/27/23 1624    Specimen: Blood Updated: 09/27/23 1636     Glucose 253 mg/dL      Comment: : lobito Rodriguez ID: [id number]       POC Glucose Once [[id number]]  (Abnormal) Collected: 09/27/23 0747    Specimen: Blood Updated: 09/27/23 0758     Glucose 153 mg/dL      Comment: : lobito Rodriguez ID:  [id number]       High Sensitivity Troponin T 2Hr [[id number]]  (Abnormal) Collected: 09/27/23 0514    Specimen: Blood Updated: 09/27/23 0602     HS Troponin T 43 ng/L      Troponin T Delta -13 ng/L     Narrative:      High Sensitive Troponin T Reference Range:  <10.0 ng/L- Negative Female for AMI  <15.0 ng/L- Negative Male for AMI  >=10 - Abnormal Female indicating possible myocardial injury.  >=15 - Abnormal Male indicating possible myocardial injury.   Clinicians would have to utilize clinical acumen, EKG, Troponin, and serial changes to determine if it is an Acute Myocardial Infarction or myocardial injury due to an underlying chronic condition.         POC Glucose Once [[id number]]  (Abnormal) Collected: 09/26/23 2048    Specimen: Blood Updated: 09/26/23 2101     Glucose 242 mg/dL      Comment: : caroline Fernie larosechristopherMeter ID: [id number]       High Sensitivity Troponin T [[id number]]  (Abnormal) Collected: 09/26/23 2011    Specimen: Blood Updated: 09/26/23 2039     HS Troponin T 56 ng/L     Narrative:      High Sensitive Troponin T Reference Range:  <10.0 ng/L- Negative Female for AMI  <15.0 ng/L- Negative Male for AMI  >=10 - Abnormal Female indicating possible myocardial injury.  >=15 - Abnormal Male indicating possible myocardial injury.   Clinicians would have to utilize clinical acumen, EKG, Troponin, and serial changes to determine if it is an Acute Myocardial Infarction or myocardial injury due to an underlying chronic condition.         POC Glucose Once [[id number]]  (Abnormal) Collected: 09/26/23 1619    Specimen: Blood Updated: 09/26/23 1644     Glucose 262 mg/dL      Comment: : mona Lopez BrittanyMeter ID: [id number]       POC Glucose Once [[id number]]  (Abnormal) Collected: 09/26/23 1105    Specimen: Blood Updated: 09/26/23 1130     Glucose 165 mg/dL      Comment: : mona Wattslock BrittanyMeter ID: [id number]       POC Glucose Once [[id number]]  (Abnormal) Collected:  09/26/23 0712    Specimen: Blood Updated: 09/26/23 0723     Glucose 145 mg/dL      Comment: : mona Lopez BrittanyMeter ID: [id number]       POC Glucose Once [[id number]]  (Abnormal) Collected: 09/25/23 2026    Specimen: Blood Updated: 09/25/23 2039     Glucose 320 mg/dL      Comment: : caroline Agrawaler ID: [id number]       POC Glucose Once [[id number]]  (Abnormal) Collected: 09/25/23 1612    Specimen: Blood Updated: 09/25/23 1623     Glucose 224 mg/dL      Comment: : mona Lopez BrittanyMeter ID: [id number]       POC Glucose Once [[id number]]  (Abnormal) Collected: 09/25/23 1110    Specimen: Blood Updated: 09/25/23 1121     Glucose 317 mg/dL      Comment: : mona Lopez BrittanyMeter ID: [id number]                   Vital signs: Temperature 97.5, heart rate 76, respiratory rate 22, blood pressure 146/78, oxygen saturation 97%    9/20 778 intake and output documentation: Intake: 1200 cc, output: 1100 cc    Assessment & Plan     1.  Proteus wound infection status postdebridement of left foot on 9/7: Treatment per admitting team    2.  Chest pain: There are somewhat atypical features.  There is definitely a component of chest wall pain but he also describes exertional chest discomfort with occasional radiation to the left arm relieved within an hour of rest.  I do not appreciate ischemic EKG changes.  His troponins are elevated but flat trending.  Overall, picture is not consistent with acute coronary syndrome and is consistent with a component of noncardiac chest pain as well as possible chronic stable angina.  I would not recommend further ischemic evaluation at this time for his stable symptoms over the past 3 weeks.  If he continues to have symptoms following recovery from his wound, consider ischemic evaluation.  Repeat troponin and EKG with any recurrences of acute chest pain.  Add Imdur 30 mg daily to see if he may have any symptomatic improvement  if a component of his chest discomfort is indeed ischemic.    3.  Atrial fibrillation: Newly diagnosed around 9/8 according to documentation.  He is rate controlled and anticoagulated and appears to be asymptomatic to this.  Continue Eliquis for anticoagulation and Coreg for rate control.  Continue telemetry monitoring.    4.  Chronic diastolic congestive heart failure: Given his renal function it appears his diuretics are currently on hold.  He is euvolemic on exam.  Echocardiogram here on 8/22 revealed an LVEF of 51 to 55%.  Echocardiogram report at the outlying facility indicates his LVEF is 41 to 47% on 9/8 echo.  Continue Coreg and ARB.  Continue to monitor volume status closely.    5.  Deconditioning: Continue physical therapy per admitting team    6.  Morbid obesity  7.  Diabetes mellitus type 2  8.  CKD stage III with WANDER  9.  Obstructive sleep apnea  10.  Possible gastroparesis  11.  Recent sepsis secondary to wound infection  12.  CAD: He had CABG in approximately 2015.  Most recent cardiac catheterization performed for abnormal stress test in February 2021 revealed patent grafts and PCI was not needed.  Continue beta-blocker, high intensity statin.  Not currently on aspirin given anticoagulation with Eliquis  13.  Anemia      I discussed the patient's findings and my recommendations with patient, family, and nursing staff.     Electronically signed by SUSAN Amrstrong, 09/28/23, 11:07 AM CDT.

## 2023-09-29 LAB
GLUCOSE BLDC GLUCOMTR-MCNC: 164 MG/DL (ref 70–130)
GLUCOSE BLDC GLUCOMTR-MCNC: 192 MG/DL (ref 70–130)
GLUCOSE BLDC GLUCOMTR-MCNC: 205 MG/DL (ref 70–130)
GLUCOSE BLDC GLUCOMTR-MCNC: 250 MG/DL (ref 70–130)

## 2023-09-29 PROCEDURE — 63710000001 ONDANSETRON PER 8 MG: Performed by: INTERNAL MEDICINE

## 2023-09-29 PROCEDURE — 63710000001 INSULIN DETEMIR PER 5 UNITS: Performed by: INTERNAL MEDICINE

## 2023-09-29 PROCEDURE — 97535 SELF CARE MNGMENT TRAINING: CPT

## 2023-09-29 PROCEDURE — 97168 OT RE-EVAL EST PLAN CARE: CPT

## 2023-09-29 PROCEDURE — 25010000002 ONDANSETRON PER 1 MG: Performed by: INTERNAL MEDICINE

## 2023-09-29 PROCEDURE — 97116 GAIT TRAINING THERAPY: CPT

## 2023-09-29 PROCEDURE — 25010000002 HYDROMORPHONE 1 MG/ML SOLUTION: Performed by: INTERNAL MEDICINE

## 2023-09-29 PROCEDURE — 25010000002 CEFTRIAXONE PER 250 MG: Performed by: INTERNAL MEDICINE

## 2023-09-29 PROCEDURE — 63710000001 INSULIN LISPRO (HUMAN) PER 5 UNITS: Performed by: INTERNAL MEDICINE

## 2023-09-29 PROCEDURE — 82948 REAGENT STRIP/BLOOD GLUCOSE: CPT

## 2023-09-29 NOTE — PROGRESS NOTES
Nephrology ([facility]) Progress Note      Patient:  Erick Luong  YOB: 1955  Date of Service: 9/29/2023  MRN: [id number]   Acct: [id number]   Primary Care Physician: Del Shetty MD  Advance Directive:   There are no questions and answers to display.     Admit Date: 9/18/2023       Hospital Day: 0  Referring Provider: Juan Manuel Page MD      Patient personally seen and examined.  Complete chart including Consults, Notes, Operative Reports, Labs, Cardiology, and Radiology studies reviewed as able.        Subjective:  Erick Luong is a 68 y.o. male for whom we were consulted for evaluation and treatment of chronic kidney disease stage IIIb.  He has stage IIIb chronic kidney disease baseline and follows with Dr. Lomas in the office as he has diabetic nephropathy.  He has a history of insulin-dependent type 2 diabetes, hypertension, abdominal obesity, obstructive sleep apnea, diabetic foot ulcer and coronary artery disease.  He was accepted as a transfer from [facility].  Patient was [place] at [facility] [place]. Gomes and extensive debridement of left foot wound at plantar aspect.  Postoperative wound measuring 6 x 17 x 4 cm with exposed bone.  Surgical culture returned positive for Proteus Mirabills.  Patient needed long-term IV antibiotics.  He is now admitted for wound care, long-term IV antibiotics, chronic kidney disease and treatment of diastolic congestive heart failure.  Patient also has history of gastroparesis.      Today, no overnight events.  Feels edema has been much improved this admission.  Still with some weakness and was up in chair.    Temp- 97.8  Pulse- 79  Resp- 18  BP- 124/60  O2%- 95      Allergies:  Cefepime, Bactrim [sulfamethoxazole-trimethoprim], Vancomycin, Zolpidem, Zolpidem tartrate, and Metronidazole    Home Meds:  Medications Prior to Admission   Medication Sig Dispense Refill Last Dose    amitriptyline (ELAVIL) 25 MG  tablet Take 2 tablets by mouth Daily at bedtime. 60 tablet 1     amoxicillin-clavulanate (Augmentin) 500-125 MG per tablet Take 1 tablet by mouth every 12 hours with meals for 7 days. 14 tablet 0     ascorbic acid (VITAMIN C) 1000 MG tablet Take 1 tablet by mouth Daily. 30 tablet 3     Aspirin 81 MG capsule Take 81 mg by mouth Daily.       bumetanide (BUMEX) 2 MG tablet Take 1 tablet by mouth Daily. Take 2 tablets in morning;1 tablet at night       busPIRone (BUSPAR) 10 MG tablet Take 1 tablet by mouth 2 (Two) Times a Day As Needed. 100 tablet 3     calcitriol (ROCALTROL) 0.5 MCG capsule Take 1 capsule by mouth Daily. 90 capsule 4     carvedilol (COREG) 6.25 MG tablet Take 1 tablet by mouth 2 (Two) Times a Day. 180 tablet 4     Cholecalciferol (D-5000) 125 MCG (5000 UT) tablet Take 1 tablet by mouth Daily Before Lunch. 30 tablet 2     cloNIDine (CATAPRES) 0.1 MG tablet Take 1 tablet by mouth Every 12 (Twelve) Hours. 60 tablet 2     Diclofenac Sodium (VOLTAREN) 1 % gel gel Apply 2 g topically to the appropriate area as directed 4 (Four) Times a Day As Needed. 300 g 11     donepezil (ARICEPT) 10 MG tablet Take 1 tablet by mouth Daily. 90 tablet 4     Dulaglutide (Trulicity) 4.5 MG/0.5ML solution pen-injector Inject 0.5 mL under the skin into the appropriate area as directed 1 (One) Time Per Week. 2 mL 11     empagliflozin (JARDIANCE) 25 MG tablet tablet Take 1 tablet by mouth Daily. 30 tablet 2     HYDROcodone-acetaminophen (NORCO) 7.5-325 MG per tablet Take 1 tablet by mouth 2 (Two) Times a Day As Needed. 40 tablet 0     Insulin Regular Human, Conc, (HumuLIN R U-500 KwikPen) 500 UNIT/ML solution pen-injector CONCENTRATED injection Inject 120 Units under the skin into the appropriate area as directed 2 (Two) Times a Day with breakfast and dinner. (Patient taking differently: Inject 120 Units under the skin into the appropriate area as directed 3 (Three) Times a Day Before Meals.) 18 mL 5     midodrine (PROAMATINE) 10  MG tablet Take 1 tablet by mouth 3 (Three) Times a Day. 270 tablet 4     ondansetron ODT (ZOFRAN-ODT) 8 MG disintegrating tablet Place 1 tablet under tongue 3 times a day as needed. 25 tablet 1     pantoprazole (Protonix) 40 MG EC tablet Take 1 tablet by mouth 2 (Two) Times a Day. 180 tablet 4     polyethylene glycol (MIRALAX) 17 g packet Take 17 g by mouth Daily. Obtain OTC       pregabalin (LYRICA) 100 MG capsule Take 1 capsule by mouth Daily With Breakfast AND 2 capsules Every Night. 90 capsule 2     rosuvastatin (CRESTOR) 40 MG tablet Take 1 tablet by mouth Every Night. 90 tablet 3     sennosides-docusate (PERICOLACE) 8.6-50 MG per tablet Take 1 tablet by mouth Every Night. Obtain OTC       sennosides-docusate (PERICOLACE) 8.6-50 MG per tablet Take 1 tablet by mouth every night at bedtime. 30 tablet 2     sodium hypochlorite (DAKIN'S 1/4 STRENGTH) 0.125 % solution topical solution 0.125% Apply to affected area twice daily 473 mL 3     tamsulosin (FLOMAX) 0.4 MG capsule 24 hr capsule Take 1 capsule by mouth Daily. 90 capsule 1        Medicines:  Current Facility-Administered Medications   Medication Dose Route Frequency Provider Last Rate Last Admin    acetaminophen (TYLENOL) tablet 650 mg  650 mg Oral Q4H PRN Juan Manuel Page MD        Or    acetaminophen (TYLENOL) suppository 650 mg  650 mg Rectal Q4H PRN Juan Mnauel Page MD        aluminum-magnesium hydroxide-simethicone (MAALOX MAX) 400-400-40 MG/5ML suspension 30 mL  30 mL Oral Q4H PRN Juan Manuel Page MD        apixaban (ELIQUIS) tablet 5 mg  5 mg Oral BID With Meals Juan Manuel Page MD        busPIRone (BUSPAR) tablet 10 mg  10 mg Oral Q12H PRN Juan Manuel Page MD        calcitriol (ROCALTROL) capsule 0.5 mcg  0.5 mcg Oral Daily Juan Manuel Page MD        carvedilol (COREG) tablet 3.125 mg  3.125 mg Oral BID With Meals Juan Manuel Page MD        cefTRIAXone (ROCEPHIN) 1,000 mg in sodium chloride 0.9 % 100 mL IVPB   1,000 mg Intravenous Q24H Juan Manuel Page MD        cholecalciferol (VITAMIN D3) tablet 5,000 Units  5,000 Units Oral Daily Juan Manuel Page MD        dextrose (D50W) (25 g/50 mL) IV injection 25 g  25 g Intravenous Q15 Min PRN Juan Manuel Page MD        dextrose (GLUTOSE) oral gel 15 g  15 g Oral Q15 Min PRN Juan Manuel Page MD        docusate sodium (COLACE) capsule 200 mg  200 mg Oral BID Juan Manuel Page MD        donepezil (ARICEPT) tablet 10 mg  10 mg Oral Daily Juan Manuel Page MD        Dulaglutide solution pen-injector 4.5 mg  4.5 mg Subcutaneous Weekly Juan Manuel Page MD        glucagon (GLUCAGEN) injection 1 mg  1 mg Intramuscular Q15 Min PRN Juan Manuel Page MD        HYDROmorphone (DILAUDID) injection 1 mg  1 mg Intravenous Q6H PRN Juan Manuel Page MD        [START ON 9/30/2023] insulin detemir (LEVEMIR) injection 25 Units  25 Units Subcutaneous Daily Shruthi Isabel APRN        Insulin Lispro (humaLOG) injection 2-9 Units  2-9 Units Subcutaneous 4x Daily AC & at Bedtime Juan Manuel Page MD        isosorbide mononitrate (IMDUR) 24 hr tablet 30 mg  30 mg Oral Q24H Joanie Mendes APRN        Lidocaine HCl 4 % gel gel 1 application   1 application  Topical PRN Juan Manuel Page MD        magnesium hydroxide (MILK OF MAGNESIA) 400 MG/5ML suspension 15 mL  15 mL Oral Daily PRN Juan Manuel Page MD        melatonin tablet 6 mg  6 mg Oral Nightly Shruthi Isabel APRN        metoclopramide (REGLAN) tablet 5 mg  5 mg Oral TID AC Juan Manuel Page MD        ondansetron (ZOFRAN) tablet 4 mg  4 mg Oral Q6H PRN Juan Manuel Page MD        Or    ondansetron (ZOFRAN) injection 4 mg  4 mg Intravenous Q6H PRN Juan Manuel Page MD        oxyCODONE (oxyCONTIN) 12 hr tablet 10 mg  10 mg Oral Q12H Juan Manuel Page MD        oxyCODONE (ROXICODONE) immediate release tablet 5 mg  5 mg Oral Q4H PRN Juan Manuel Page MD         pantoprazole (PROTONIX) EC tablet 40 mg  40 mg Oral BID Juan Manuel Page MD        polyethylene glycol (MIRALAX) packet 17 g  17 g Oral Daily Juan Manuel Page MD        pregabalin (LYRICA) capsule 100 mg  100 mg Oral Daily Juan Manuel Page MD        pregabalin (LYRICA) capsule 200 mg  200 mg Oral Nightly Juan Manuel Page MD        rosuvastatin (CRESTOR) tablet 40 mg  40 mg Oral Nightly Juan Manuel Page MD        saccharomyces boulardii (FLORASTOR) capsule 250 mg  250 mg Oral BID Juan Manuel Page MD        sennosides-docusate (PERICOLACE) 8.6-50 MG per tablet 1 tablet  1 tablet Oral Nightly Juan Manuel Page MD        sodium chloride 0.9 % flush 10 mL  10 mL Intravenous Q12H Juan Manuel Page MD        sodium chloride 0.9 % flush 10 mL  10 mL Intravenous PRN Juan Manuel Page MD        sucralfate (CARAFATE) tablet 1 g  1 g Oral TID AC Juan Manuel Page MD        tamsulosin (FLOMAX) 24 hr capsule 0.4 mg  0.4 mg Oral Daily Juan Manuel Page MD        valsartan (DIOVAN) tablet 40 mg  40 mg Oral Nightly Juan Manuel Page MD           Past Medical History:  Past Medical History:   Diagnosis Date    Arthritis     Autonomic disease     CAD (coronary artery disease) 02/06/2017    Cervical radiculopathy 09/16/2021    Chronic constipation with acute exaccerbation 05/10/2021    Coronary artery disease     Degeneration of cervical intervertebral disc 08/11/2021    Diabetes mellitus     Diabetic foot ulcer 08/31/2020    Diabetic polyneuropathy associated with type 2 diabetes mellitus 01/18/2021    Elevated cholesterol     Gastroesophageal reflux disease 05/13/2019    Headache     HTN (hypertension), benign 05/03/2017    Hyperlipidemia     Hypertension     Mixed hyperlipidemia 02/07/2017    Multiple lung nodules 01/26/2020    5mm, 9 mm RLL identified 1/2020, not present 10/2019.    Myocardial infarction     Osteomyelitis 01/22/2020    Osteomyelitis of fifth  toe of right foot 10/07/2019    Pancreatitis     Persistent insomnia 01/20/2020    Renal disorder     Sleep apnea 02/06/2017    Sleep apnea with use of continuous positive airway pressure (CPAP)     NON-COMPLIANT    Slow transit constipation 01/16/2019    Spinal stenosis in cervical region 09/16/2021    Vitamin D deficiency 03/02/2021       Past Surgical History:  Past Surgical History:   Procedure Laterality Date    ABDOMINAL SURGERY      AMPUTATION FOOT / TOE Left 10/2021    5th digit     ANTERIOR CERVICAL DISCECTOMY W/ FUSION N/A 8/5/2022    Procedure: CERVICAL DISCECTOMY ANTERIOR WITH FUSION C5-6 with possible plating of C5-7 with neuromonitoring and 1 c-arm;  Surgeon: Karel Soliz MD;  Location:  [facility] OR;  Service: Neurosurgery;  Laterality: N/A;    APPENDECTOMY      BACK SURGERY      CARDIAC CATHETERIZATION Left 02/08/2021    Procedure: Left Heart Cath w poss intervention left anatomical snuff box acess;  Surgeon: Omkar Charles DO;  Location:  [facility] CATH INVASIVE LOCATION;  Service: Cardiology;  Laterality: Left;    CARDIAC SURGERY      CATARACT EXTRACTION      CERVICAL SPINE SURGERY      COLONOSCOPY N/A 01/31/2017    Normal exam repeat in 5 years    COLONOSCOPY N/A 02/11/2019    Mild acute inflammation    COLONOSCOPY W/ POLYPECTOMY  03/04/2014    Hyperplastic polyp    CORONARY ARTERY BYPASS GRAFT  10/2015    ENDOSCOPY  04/13/2011    Gastritis with hemorrhage    ENDOSCOPY N/A 05/05/2017    Normal exam    ENDOSCOPY N/A 02/11/2019    Gastritis    ENDOSCOPY N/A 09/01/2020    Non-erosive gastritis with hemorrhage    ENDOSCOPY N/A 02/10/2021    Esophagitis    FOOT SURGERY Left     INCISION AND DRAINAGE OF WOUND Left 09/2007    spider bite       Family History  Family History   Problem Relation Age of Onset    Colon cancer Father     Heart disease Father     Colon cancer Sister     Colon polyps Sister     Alzheimer's disease Mother     Coronary artery disease Sister     Coronary artery disease  Sister        Social History  Social History     Socioeconomic History    Marital status:    Tobacco Use    Smoking status: Former     Types: Cigarettes     Quit date:      Years since quittin.7    Smokeless tobacco: Never    Tobacco comments:     smoked in highschool   Vaping Use    Vaping Use: Never used   Substance and Sexual Activity    Alcohol use: No    Drug use: No    Sexual activity: Defer       Review of Systems:  History obtained from chart review and the patient  General ROS: No fever or chills  Respiratory ROS: No cough, shortness of breath, wheezing  Cardiovascular ROS: No chest pain or palpitations  Gastrointestinal ROS: No abdominal pain or melena  Genito-Urinary ROS: No dysuria or hematuria  Psych ROS: No anxiety and depression  14 point ROS reviewed with the patient and negative except as noted above and in the HPI unless unable to obtain.    Objective:  No data found.    No intake or output data in the 24 hours ending 23 1400  General: awake/alert   Chest:  clear to auscultation bilaterally without respiratory distress  CVS: regular rate and rhythm  Abdominal: soft, nontender, positive bowel sounds  Extremities: lle edema  Skin: lle wound vac, warm/dry      Labs:  Results from last 7 days   Lab Units 23  0530 23  0627   WBC 10*3/mm3 6.49 6.70   HEMOGLOBIN g/dL 9.2* 9.1*   HEMATOCRIT % 30.8* 30.3*   PLATELETS 10*3/mm3 260 288         Results from last 7 days   Lab Units 23  0530 23  0627 23  0627   SODIUM mmol/L 137 141 139   POTASSIUM mmol/L 4.7 4.6 4.2   CHLORIDE mmol/L 103 104 103   CO2 mmol/L 24.0 26.0 26.0   BUN mg/dL 29* 32* 35*   CREATININE mg/dL 2.09* 2.19* 2.84*   CALCIUM mg/dL 9.0 9.4 8.8   EGFR mL/min/1.73 34.1* 32.2* 23.6*   GLUCOSE mg/dL 384* 93 90       Radiology:   Imaging Results (Last 72 Hours)       ** No results found for the last 72 hours. **            Culture:  No results found for: BLOODCX, URINECX, WOUNDCX, MRSACX, RESPCX,  STOOLCX      Assessment   Acute kidney injury/ATN  Chronic kidney disease stage IIIb  Diabetes type 2 with diabetic nephropathy  Diabetic foot ulcer with surgical debridement and wound VAC  Chronic diastolic congestive heart failure  Wound infection with Proteus  Gastroparesis  Secondary hyperparathyroidism  Anemia and chronic kidney disease  Chest Pain    Plan:  Discussed with patient, nursing  Work-up reviewed today  Monitor labs per LTACH schedule  Calcitriol  Continue to monitor renal function has improved 9/25 and 9/28 with adjustments in medication      Willy Arteaga MD  9/29/2023  14:00 CDT

## 2023-09-29 NOTE — PROGRESS NOTES
[facility]  NIMESH Parkinson APRN        Internal Medicine Progress Note    9/29/2023   09:18 CDT    Name:  Erick Luong  MRN:    [id number]     Acct:     [id number]   Room:  [address] [place] [address] Day: 0     Admit Date: 9/18/2023  6:30 PM  PCP: Del Shetty MD    Subjective:     C/C: weakness, fatigue    Interval History: Status:  improved. Up to chair. No family at bedside. Afebrile. Woke from sleep. 02 sats stable on room air. Blood sugars elevated. Pain fairly well controlled at present.     Review of Systems   Constitutional: Positive for malaise/fatigue. Negative for chills, decreased appetite, weight gain and weight loss.   HENT:  Negative for congestion, ear discharge, hoarse voice and tinnitus.    Eyes:  Negative for blurred vision, discharge, visual disturbance and visual halos.   Cardiovascular:  Negative for chest pain, claudication, dyspnea on exertion, irregular heartbeat, leg swelling, orthopnea and paroxysmal nocturnal dyspnea.   Respiratory:  Negative for cough, shortness of breath, sputum production and wheezing.    Endocrine: Negative for cold intolerance, heat intolerance and polyuria.   Hematologic/Lymphatic: Negative for adenopathy. Does not bruise/bleed easily.   Skin:  Positive for poor wound healing. Negative for dry skin, itching and suspicious lesions.   Musculoskeletal:  Positive for neck pain. Negative for arthritis, back pain, falls, joint pain, muscle weakness and myalgias.   Gastrointestinal:  Negative for abdominal pain, constipation, diarrhea, dysphagia and hematemesis.   Genitourinary:  Negative for bladder incontinence, dysuria and frequency.   Neurological:  Positive for weakness. Negative for aphonia, disturbances in coordination and dizziness.   Psychiatric/Behavioral:  Negative for altered mental status, depression, memory loss and substance abuse. The patient does not have insomnia and is not nervous/anxious.        Medications:     Allergies:  "  Allergies   Allergen Reactions    Cefepime Hives and Anaphylaxis    Bactrim [Sulfamethoxazole-Trimethoprim] Other (See Comments)     \"RENAL FAILURE\"    Vancomycin Itching    Zolpidem Unknown - High Severity     \"makes him crazy\"    Zolpidem Tartrate Unknown - Low Severity and Provider Review Needed    Metronidazole Rash       Current Meds:   Current Facility-Administered Medications:     acetaminophen (TYLENOL) tablet 650 mg, 650 mg, Oral, Q4H PRN **OR** acetaminophen (TYLENOL) suppository 650 mg, 650 mg, Rectal, Q4H PRN, Juan Manuel Page MD    aluminum-magnesium hydroxide-simethicone (MAALOX MAX) 400-400-40 MG/5ML suspension 30 mL, 30 mL, Oral, Q4H PRN, Juan Manuel Page MD    apixaban (ELIQUIS) tablet 5 mg, 5 mg, Oral, BID With Meals, Juan Manuel Page MD    busPIRone (BUSPAR) tablet 10 mg, 10 mg, Oral, Q12H PRN, Juan Manuel Page MD    calcitriol (ROCALTROL) capsule 0.5 mcg, 0.5 mcg, Oral, Daily, Juan Manuel Page MD    carvedilol (COREG) tablet 3.125 mg, 3.125 mg, Oral, BID With Meals, Juan Manuel Page MD    cefTRIAXone (ROCEPHIN) 1,000 mg in sodium chloride 0.9 % 100 mL IVPB, 1,000 mg, Intravenous, Q24H, Juan Manuel Page MD    cholecalciferol (VITAMIN D3) tablet 5,000 Units, 5,000 Units, Oral, Daily, Juan Manuel Page MD    dextrose (D50W) (25 g/50 mL) IV injection 25 g, 25 g, Intravenous, Q15 Min PRN, Juan Manuel Page MD    dextrose (GLUTOSE) oral gel 15 g, 15 g, Oral, Q15 Min PRN, Juan Manuel Page MD    docusate sodium (COLACE) capsule 200 mg, 200 mg, Oral, BID, Juan Manuel Page MD    donepezil (ARICEPT) tablet 10 mg, 10 mg, Oral, Daily, Juan Manuel Page MD    Dulaglutide solution pen-injector 4.5 mg, 4.5 mg, Subcutaneous, Weekly, Juan Manuel Page MD    glucagon (GLUCAGEN) injection 1 mg, 1 mg, Intramuscular, Q15 Min PRN, Juan Manuel Page MD    HYDROmorphone (DILAUDID) injection 1 mg, 1 mg, Intravenous, Q6H PRN, Juan Manuel Page" MD Santana    insulin detemir (LEVEMIR) injection 20 Units, 20 Units, Subcutaneous, Daily, Juan Manuel Page MD    Insulin Lispro (humaLOG) injection 2-9 Units, 2-9 Units, Subcutaneous, 4x Daily AC & at Bedtime, Juan Manuel Page MD    isosorbide mononitrate (IMDUR) 24 hr tablet 30 mg, 30 mg, Oral, Q24H, Joanie Mendes APRN    Lidocaine HCl 4 % gel gel 1 application , 1 application , Topical, PRN, Juan Manuel Page MD    magnesium hydroxide (MILK OF MAGNESIA) 400 MG/5ML suspension 15 mL, 15 mL, Oral, Daily PRN, Juan Manuel Page MD    melatonin tablet 6 mg, 6 mg, Oral, Nightly, Shruthi Isabel APRN    metoclopramide (REGLAN) tablet 5 mg, 5 mg, Oral, TID AC, Juan Manuel Page MD    ondansetron (ZOFRAN) tablet 4 mg, 4 mg, Oral, Q6H PRN **OR** ondansetron (ZOFRAN) injection 4 mg, 4 mg, Intravenous, Q6H PRN, Juan Manuel Page MD    oxyCODONE (oxyCONTIN) 12 hr tablet 10 mg, 10 mg, Oral, Q12H, Juan Manuel Page MD    oxyCODONE (ROXICODONE) immediate release tablet 5 mg, 5 mg, Oral, Q4H PRN, Juan Manuel Page MD    pantoprazole (PROTONIX) EC tablet 40 mg, 40 mg, Oral, BID, Juan Manuel Page MD    polyethylene glycol (MIRALAX) packet 17 g, 17 g, Oral, Daily, Juan Manuel Page MD    pregabalin (LYRICA) capsule 100 mg, 100 mg, Oral, Daily, Juan Manuel Page MD    pregabalin (LYRICA) capsule 200 mg, 200 mg, Oral, Nightly, Juan Manuel Page MD    rosuvastatin (CRESTOR) tablet 40 mg, 40 mg, Oral, Nightly, Juan Manuel Page MD    saccharomyces boulardii (FLORASTOR) capsule 250 mg, 250 mg, Oral, BID, Juan Manuel Page MD    sennosides-docusate (PERICOLACE) 8.6-50 MG per tablet 1 tablet, 1 tablet, Oral, Nightly, Juan Manuel Page MD    sodium chloride 0.9 % flush 10 mL, 10 mL, Intravenous, Q12H, Juan Manuel Page MD    sodium chloride 0.9 % flush 10 mL, 10 mL, Intravenous, PRN, Juan Manuel Page MD    sucralfate (CARAFATE) tablet 1 g, 1 g,  Oral, TID AC, Juan Manuel Page MD    tamsulosin (FLOMAX) 24 hr capsule 0.4 mg, 0.4 mg, Oral, Daily, Juan Manuel Page MD    valsartan (DIOVAN) tablet 40 mg, 40 mg, Oral, Nightly, Juan Manule Page MD    Data:     Code Status:    There are no questions and answers to display.       Family History   Problem Relation Age of Onset    Colon cancer Father     Heart disease Father     Colon cancer Sister     Colon polyps Sister     Alzheimer's disease Mother     Coronary artery disease Sister     Coronary artery disease Sister        Social History     Socioeconomic History    Marital status:    Tobacco Use    Smoking status: Former     Types: Cigarettes     Quit date:      Years since quittin.7    Smokeless tobacco: Never    Tobacco comments:     smoked in [employer]   Vaping Use    Vaping Use: Never used   Substance and Sexual Activity    Alcohol use: No    Drug use: No    Sexual activity: Defer       Vitals:  T 97.8 P 79 R 18 Bp 124/60 Sp02 95% (room air)            I/O (24Hr):  No intake or output data in the 24 hours ending 23 0918    Labs and imaging:      Recent Results (from the past 12 hour(s))   POC Glucose Once    Collection Time: 23  8:09 AM    Specimen: Blood   Result Value Ref Range    Glucose 164 (H) 70 - 130 mg/dL                               Physical Examination:        Physical Exam  Vitals and nursing note reviewed.   Constitutional:       Appearance: Normal appearance. He is well-developed. He is obese.   HENT:      Head: Normocephalic and atraumatic.      Right Ear: External ear normal.      Left Ear: External ear normal.      Nose: Nose normal.      Mouth/Throat:      Mouth: Mucous membranes are moist.      Pharynx: Oropharynx is clear.   Eyes:      Pupils: Pupils are equal, round, and reactive to light.   Cardiovascular:      Rate and Rhythm: Normal rate. Rhythm irregular.      Heart sounds: Normal heart sounds.   Pulmonary:      Effort: Pulmonary effort  is normal.      Breath sounds: Normal breath sounds.   Abdominal:      General: Bowel sounds are normal.      Palpations: Abdomen is soft.      Comments: obese   Musculoskeletal:         General: Normal range of motion.      Cervical back: Normal range of motion and neck supple.      Comments: Generalized weakness   Skin:     General: Skin is warm and dry.      Comments: Wound vac intact   Neurological:      Mental Status: He is alert and oriented to person, place, and time.      Deep Tendon Reflexes: Reflexes are normal and symmetric.   Psychiatric:         Behavior: Behavior normal.         Assessment:            * No active hospital problems. *    Past Medical History:   Diagnosis Date    Arthritis     Autonomic disease     CAD (coronary artery disease) 02/06/2017    Cervical radiculopathy 09/16/2021    Chronic constipation with acute exaccerbation 05/10/2021    Coronary artery disease     Degeneration of cervical intervertebral disc 08/11/2021    Diabetes mellitus     Diabetic foot ulcer 08/31/2020    Diabetic polyneuropathy associated with type 2 diabetes mellitus 01/18/2021    Elevated cholesterol     Gastroesophageal reflux disease 05/13/2019    Headache     HTN (hypertension), benign 05/03/2017    Hyperlipidemia     Hypertension     Mixed hyperlipidemia 02/07/2017    Multiple lung nodules 01/26/2020    5mm, 9 mm RLL identified 1/2020, not present 10/2019.    Myocardial infarction     Osteomyelitis 01/22/2020    Osteomyelitis of fifth toe of right foot 10/07/2019    Pancreatitis     Persistent insomnia 01/20/2020    Renal disorder     Sleep apnea 02/06/2017    Sleep apnea with use of continuous positive airway pressure (CPAP)     NON-COMPLIANT    Slow transit constipation 01/16/2019    Spinal stenosis in cervical region 09/16/2021    Vitamin D deficiency 03/02/2021        Plan:        Proteus wound infection s/p debridement  New onset atrial fibrillation  Acute on chronic diastolic CHF  CKD3  Multifactorial  anemia  DM2 with hyperglycemia on long term insulin  BRITTNI  Chronic pain syndrome  Chronic neck pain with radiculopathy  Diabetic peripheral neuropathy  Hx CAD  GERD  Possible gastroparesis  Sepsis secondary to wound infection  Continue current treatment. Monitor counts. Increase activity. Labs Monday. Wound care per wound care team. Continue IV antibiotics. Appreciate nephrology evaluation and treatment. Continue glycemic control efforts. Maintain patient safety. Continue pain control efforts. Appreciate cardiology evaluation and recommendations.       Electronically signed by SUSAN Muñoz on 9/29/2023 at 09:18 CDT

## 2023-09-30 LAB
GLUCOSE BLDC GLUCOMTR-MCNC: 184 MG/DL (ref 70–130)
GLUCOSE BLDC GLUCOMTR-MCNC: 188 MG/DL (ref 70–130)
GLUCOSE BLDC GLUCOMTR-MCNC: 218 MG/DL (ref 70–130)
GLUCOSE BLDC GLUCOMTR-MCNC: 244 MG/DL (ref 70–130)

## 2023-09-30 PROCEDURE — 63710000001 INSULIN DETEMIR PER 5 UNITS: Performed by: NURSE PRACTITIONER

## 2023-09-30 PROCEDURE — 25010000002 CEFTRIAXONE PER 250 MG: Performed by: INTERNAL MEDICINE

## 2023-09-30 PROCEDURE — 82948 REAGENT STRIP/BLOOD GLUCOSE: CPT

## 2023-09-30 PROCEDURE — 97530 THERAPEUTIC ACTIVITIES: CPT

## 2023-09-30 PROCEDURE — 63710000001 INSULIN LISPRO (HUMAN) PER 5 UNITS: Performed by: INTERNAL MEDICINE

## 2023-09-30 PROCEDURE — 97116 GAIT TRAINING THERAPY: CPT

## 2023-09-30 PROCEDURE — 25010000002 HYDROMORPHONE 1 MG/ML SOLUTION: Performed by: INTERNAL MEDICINE

## 2023-09-30 RX ORDER — OXYCODONE HYDROCHLORIDE 5 MG/1
5 TABLET ORAL EVERY 4 HOURS PRN
Status: DISCONTINUED | OUTPATIENT
Start: 2023-10-01 | End: 2023-10-08

## 2023-09-30 RX ORDER — BUMETANIDE 1 MG/1
1 TABLET ORAL DAILY
Status: DISCONTINUED | OUTPATIENT
Start: 2023-10-01 | End: 2023-10-02 | Stop reason: SDUPTHER

## 2023-09-30 NOTE — PROGRESS NOTES
Nephrology ([facility]) Progress Note      Patient:  Erick Luong  YOB: 1955  Date of Service: 9/30/2023  MRN: [id number]   Acct: [id number]   Primary Care Physician: Del Shetty MD  Advance Directive:   There are no questions and answers to display.     Admit Date: 9/18/2023       Hospital Day: 0  Referring Provider: Juan Manuel Page MD      Patient personally seen and examined.  Complete chart including Consults, Notes, Operative Reports, Labs, Cardiology, and Radiology studies reviewed as able.        Subjective:  Erick Luong is a 68 y.o. male for whom we were consulted for evaluation and treatment of chronic kidney disease stage IIIb.  He has stage IIIb chronic kidney disease baseline and follows with Dr. Lomas in the office as he has diabetic nephropathy.  He has a history of insulin-dependent type 2 diabetes, hypertension, abdominal obesity, obstructive sleep apnea, diabetic foot ulcer and coronary artery disease.  He was accepted as a transfer from [facility].  Patient was admitted at [facility] by Dr. Gomes and extensive debridement of left foot wound at plantar aspect.  Postoperative wound measuring 6 x 17 x 4 cm with exposed bone.  Surgical culture returned positive for Proteus Mirabills.  Patient needed long-term IV antibiotics.  He is now admitted for wound care, long-term IV antibiotics, chronic kidney disease and treatment of diastolic congestive heart failure.  Patient also has history of gastroparesis.      Today, no overnight events.  Feels edema has been much improved this admission.  Still with some weakness and was up in chair.    Temp- 97.8  Pulse- 79  Resp- 18  BP- 124/60  O2%- 95      Allergies:  Cefepime, Bactrim [sulfamethoxazole-trimethoprim], Vancomycin, Zolpidem, Zolpidem tartrate, and Metronidazole    Home Meds:  Medications Prior to Admission   Medication Sig Dispense Refill Last Dose    amitriptyline (ELAVIL) 25 MG  tablet Take 2 tablets by mouth Daily at bedtime. 60 tablet 1     amoxicillin-clavulanate (Augmentin) 500-125 MG per tablet Take 1 tablet by mouth every 12 hours with meals for 7 days. 14 tablet 0     ascorbic acid (VITAMIN C) 1000 MG tablet Take 1 tablet by mouth Daily. 30 tablet 3     Aspirin 81 MG capsule Take 81 mg by mouth Daily.       bumetanide (BUMEX) 2 MG tablet Take 1 tablet by mouth Daily. Take 2 tablets in morning;1 tablet at night       busPIRone (BUSPAR) 10 MG tablet Take 1 tablet by mouth 2 (Two) Times a Day As Needed. 100 tablet 3     calcitriol (ROCALTROL) 0.5 MCG capsule Take 1 capsule by mouth Daily. 90 capsule 4     carvedilol (COREG) 6.25 MG tablet Take 1 tablet by mouth 2 (Two) Times a Day. 180 tablet 4     Cholecalciferol (D-5000) 125 MCG (5000 UT) tablet Take 1 tablet by mouth Daily Before Lunch. 30 tablet 2     cloNIDine (CATAPRES) 0.1 MG tablet Take 1 tablet by mouth Every 12 (Twelve) Hours. 60 tablet 2     Diclofenac Sodium (VOLTAREN) 1 % gel gel Apply 2 g topically to the appropriate area as directed 4 (Four) Times a Day As Needed. 300 g 11     donepezil (ARICEPT) 10 MG tablet Take 1 tablet by mouth Daily. 90 tablet 4     Dulaglutide (Trulicity) 4.5 MG/0.5ML solution pen-injector Inject 0.5 mL under the skin into the appropriate area as directed 1 (One) Time Per Week. 2 mL 11     empagliflozin (JARDIANCE) 25 MG tablet tablet Take 1 tablet by mouth Daily. 30 tablet 2     HYDROcodone-acetaminophen (NORCO) 7.5-325 MG per tablet Take 1 tablet by mouth 2 (Two) Times a Day As Needed. 40 tablet 0     Insulin Regular Human, Conc, (HumuLIN R U-500 KwikPen) 500 UNIT/ML solution pen-injector CONCENTRATED injection Inject 120 Units under the skin into the appropriate area as directed 2 (Two) Times a Day with breakfast and dinner. (Patient taking differently: Inject 120 Units under the skin into the appropriate area as directed 3 (Three) Times a Day Before Meals.) 18 mL 5     midodrine (PROAMATINE) 10  MG tablet Take 1 tablet by mouth 3 (Three) Times a Day. 270 tablet 4     ondansetron ODT (ZOFRAN-ODT) 8 MG disintegrating tablet Place 1 tablet under tongue 3 times a day as needed. 25 tablet 1     pantoprazole (Protonix) 40 MG EC tablet Take 1 tablet by mouth 2 (Two) Times a Day. 180 tablet 4     polyethylene glycol (MIRALAX) 17 g packet Take 17 g by mouth Daily. Obtain OTC       pregabalin (LYRICA) 100 MG capsule Take 1 capsule by mouth Daily With Breakfast AND 2 capsules Every Night. 90 capsule 2     rosuvastatin (CRESTOR) 40 MG tablet Take 1 tablet by mouth Every Night. 90 tablet 3     sennosides-docusate (PERICOLACE) 8.6-50 MG per tablet Take 1 tablet by mouth Every Night. Obtain OTC       sennosides-docusate (PERICOLACE) 8.6-50 MG per tablet Take 1 tablet by mouth every night at bedtime. 30 tablet 2     sodium hypochlorite (DAKIN'S 1/4 STRENGTH) 0.125 % solution topical solution 0.125% Apply to affected area twice daily 473 mL 3     tamsulosin (FLOMAX) 0.4 MG capsule 24 hr capsule Take 1 capsule by mouth Daily. 90 capsule 1        Medicines:  Current Facility-Administered Medications   Medication Dose Route Frequency Provider Last Rate Last Admin    acetaminophen (TYLENOL) tablet 650 mg  650 mg Oral Q4H PRN Juan Manuel Page MD        Or    acetaminophen (TYLENOL) suppository 650 mg  650 mg Rectal Q4H PRN Juan Maunel Page MD        aluminum-magnesium hydroxide-simethicone (MAALOX MAX) 400-400-40 MG/5ML suspension 30 mL  30 mL Oral Q4H PRN Juan Manuel Page MD        apixaban (ELIQUIS) tablet 5 mg  5 mg Oral BID With Meals Juan Manuel Page MD        busPIRone (BUSPAR) tablet 10 mg  10 mg Oral Q12H PRN Juan Manuel Page MD        calcitriol (ROCALTROL) capsule 0.5 mcg  0.5 mcg Oral Daily Juan Manuel Page MD        carvedilol (COREG) tablet 3.125 mg  3.125 mg Oral BID With Meals Juan Manuel Page MD        cefTRIAXone (ROCEPHIN) 1,000 mg in sodium chloride 0.9 % 100 mL IVPB   1,000 mg Intravenous Q24H Juan Manuel Page MD        cholecalciferol (VITAMIN D3) tablet 5,000 Units  5,000 Units Oral Daily Juan Manuel Page MD        dextrose (D50W) (25 g/50 mL) IV injection 25 g  25 g Intravenous Q15 Min PRN Juan Manuel Page MD        dextrose (GLUTOSE) oral gel 15 g  15 g Oral Q15 Min PRN Juan Manuel Page MD        docusate sodium (COLACE) capsule 200 mg  200 mg Oral BID Juan Manuel Page MD        donepezil (ARICEPT) tablet 10 mg  10 mg Oral Daily Juan Manuel Page MD        Dulaglutide solution pen-injector 4.5 mg  4.5 mg Subcutaneous Weekly Juan Manuel Page MD        glucagon (GLUCAGEN) injection 1 mg  1 mg Intramuscular Q15 Min PRN Juan Manuel Page MD        HYDROmorphone (DILAUDID) injection 1 mg  1 mg Intravenous Q6H PRN Juan Manuel Page MD        insulin detemir (LEVEMIR) injection 25 Units  25 Units Subcutaneous Daily Shruthi Isabel APRN        Insulin Lispro (humaLOG) injection 2-9 Units  2-9 Units Subcutaneous 4x Daily AC & at Bedtime Juan Manuel Page MD        isosorbide mononitrate (IMDUR) 24 hr tablet 30 mg  30 mg Oral Q24H Joanie Mendes APRN        Lidocaine HCl 4 % gel gel 1 application   1 application  Topical PRN Juan Manuel Page MD        magnesium hydroxide (MILK OF MAGNESIA) 400 MG/5ML suspension 15 mL  15 mL Oral Daily PRN Juan Manuel Page MD        melatonin tablet 6 mg  6 mg Oral Nightly Shruthi Isabel APRN        metoclopramide (REGLAN) tablet 5 mg  5 mg Oral TID AC Juan Manuel Page MD        ondansetron (ZOFRAN) tablet 4 mg  4 mg Oral Q6H PRN Juan Manuel Page MD        Or    ondansetron (ZOFRAN) injection 4 mg  4 mg Intravenous Q6H PRN Juan Manuel Page MD        oxyCODONE (oxyCONTIN) 12 hr tablet 10 mg  10 mg Oral Q12H Juan Manuel Page MD        oxyCODONE (ROXICODONE) immediate release tablet 5 mg  5 mg Oral Q4H PRN Juan Manuel Page MD        pantoprazole  (PROTONIX) EC tablet 40 mg  40 mg Oral BID Juan Manuel Page MD        polyethylene glycol (MIRALAX) packet 17 g  17 g Oral Daily Juan Manuel Page MD        pregabalin (LYRICA) capsule 100 mg  100 mg Oral Daily Juan Manuel Page MD        pregabalin (LYRICA) capsule 200 mg  200 mg Oral Nightly Juan Manuel Page MD        rosuvastatin (CRESTOR) tablet 40 mg  40 mg Oral Nightly Juan Manuel Page MD        saccharomyces boulardii (FLORASTOR) capsule 250 mg  250 mg Oral BID Juan Manuel Page MD        sennosides-docusate (PERICOLACE) 8.6-50 MG per tablet 1 tablet  1 tablet Oral Nightly Juan Manuel Page MD        sodium chloride 0.9 % flush 10 mL  10 mL Intravenous Q12H Juan Manuel Page MD        sodium chloride 0.9 % flush 10 mL  10 mL Intravenous PRN Juan Manuel Page MD        sucralfate (CARAFATE) tablet 1 g  1 g Oral TID AC Juan Manuel Page MD        tamsulosin (FLOMAX) 24 hr capsule 0.4 mg  0.4 mg Oral Daily Juan Manuel Page MD        valsartan (DIOVAN) tablet 40 mg  40 mg Oral Nightly Juan Manuel Page MD           Past Medical History:  Past Medical History:   Diagnosis Date    Arthritis     Autonomic disease     CAD (coronary artery disease) 02/06/2017    Cervical radiculopathy 09/16/2021    Chronic constipation with acute exaccerbation 05/10/2021    Coronary artery disease     Degeneration of cervical intervertebral disc 08/11/2021    Diabetes mellitus     Diabetic foot ulcer 08/31/2020    Diabetic polyneuropathy associated with type 2 diabetes mellitus 01/18/2021    Elevated cholesterol     Gastroesophageal reflux disease 05/13/2019    Headache     HTN (hypertension), benign 05/03/2017    Hyperlipidemia     Hypertension     Mixed hyperlipidemia 02/07/2017    Multiple lung nodules 01/26/2020    5mm, 9 mm RLL identified 1/2020, not present 10/2019.    Myocardial infarction     Osteomyelitis 01/22/2020    Osteomyelitis of fifth toe of right foot  10/07/2019    Pancreatitis     Persistent insomnia 01/20/2020    Renal disorder     Sleep apnea 02/06/2017    Sleep apnea with use of continuous positive airway pressure (CPAP)     NON-COMPLIANT    Slow transit constipation 01/16/2019    Spinal stenosis in cervical region 09/16/2021    Vitamin D deficiency 03/02/2021       Past Surgical History:  Past Surgical History:   Procedure Laterality Date    ABDOMINAL SURGERY      AMPUTATION FOOT / TOE Left 10/2021    5th digit     ANTERIOR CERVICAL DISCECTOMY W/ FUSION N/A 8/5/2022    Procedure: CERVICAL DISCECTOMY ANTERIOR WITH FUSION C5-6 with possible plating of C5-7 with neuromonitoring and 1 c-arm;  Surgeon: Karel Soliz MD;  Location:  [facility] OR;  Service: Neurosurgery;  Laterality: N/A;    APPENDECTOMY      BACK SURGERY      CARDIAC CATHETERIZATION Left 02/08/2021    Procedure: Left Heart Cath w poss intervention left anatomical snuff box acess;  Surgeon: Omkar Charles DO;  Location:  [facility] CATH INVASIVE LOCATION;  Service: Cardiology;  Laterality: Left;    CARDIAC SURGERY      CATARACT EXTRACTION      CERVICAL SPINE SURGERY      COLONOSCOPY N/A 01/31/2017    Normal exam repeat in 5 years    COLONOSCOPY N/A 02/11/2019    Mild acute inflammation    COLONOSCOPY W/ POLYPECTOMY  03/04/2014    Hyperplastic polyp    CORONARY ARTERY BYPASS GRAFT  10/2015    ENDOSCOPY  04/13/2011    Gastritis with hemorrhage    ENDOSCOPY N/A 05/05/2017    Normal exam    ENDOSCOPY N/A 02/11/2019    Gastritis    ENDOSCOPY N/A 09/01/2020    Non-erosive gastritis with hemorrhage    ENDOSCOPY N/A 02/10/2021    Esophagitis    FOOT SURGERY Left     INCISION AND DRAINAGE OF WOUND Left 09/2007    spider bite       Family History  Family History   Problem Relation Age of Onset    Colon cancer Father     Heart disease Father     Colon cancer Sister     Colon polyps Sister     Alzheimer's disease Mother     Coronary artery disease Sister     Coronary artery disease Sister        Social  History  Social History     Socioeconomic History    Marital status:    Tobacco Use    Smoking status: Former     Types: Cigarettes     Quit date:      Years since quittin.7    Smokeless tobacco: Never    Tobacco comments:     smoked in highschool   Vaping Use    Vaping Use: Never used   Substance and Sexual Activity    Alcohol use: No    Drug use: No    Sexual activity: Defer       Review of Systems:  History obtained from chart review and the patient  General ROS: No fever or chills  Respiratory ROS: No cough, shortness of breath, wheezing  Cardiovascular ROS: No chest pain or palpitations  Gastrointestinal ROS: No abdominal pain or melena  Genito-Urinary ROS: No dysuria or hematuria  Psych ROS: No anxiety and depression  14 point ROS reviewed with the patient and negative except as noted above and in the HPI unless unable to obtain.    Objective:  No data found.    No intake or output data in the 24 hours ending 23  General: awake/alert   Chest:  clear to auscultation bilaterally without respiratory distress  CVS: regular rate and rhythm  Abdominal: soft, nontender, positive bowel sounds  Extremities: lle edema  Skin: lle wound vac, warm/dry      Labs:  Results from last 7 days   Lab Units 23  0530 23  0627   WBC 10*3/mm3 6.49 6.70   HEMOGLOBIN g/dL 9.2* 9.1*   HEMATOCRIT % 30.8* 30.3*   PLATELETS 10*3/mm3 260 288         Results from last 7 days   Lab Units 23  0530 23  0627 23  0627   SODIUM mmol/L 137 141 139   POTASSIUM mmol/L 4.7 4.6 4.2   CHLORIDE mmol/L 103 104 103   CO2 mmol/L 24.0 26.0 26.0   BUN mg/dL 29* 32* 35*   CREATININE mg/dL 2.09* 2.19* 2.84*   CALCIUM mg/dL 9.0 9.4 8.8   EGFR mL/min/1.73 34.1* 32.2* 23.6*   GLUCOSE mg/dL 384* 93 90       Radiology:   Imaging Results (Last 72 Hours)       ** No results found for the last 72 hours. **            Culture:  No results found for: BLOODCX, URINECX, WOUNDCX, MRSACX, RESPCX,  STOOLCX      Assessment   Acute kidney injury/ATN  Chronic kidney disease stage IIIb  Diabetes type 2 with diabetic nephropathy  Diabetic foot ulcer with surgical debridement and wound VAC  Chronic diastolic congestive heart failure  Wound infection with Proteus  Gastroparesis  Secondary hyperparathyroidism  Anemia and chronic kidney disease  Chest Pain    Plan:  Discussed with patient, nursing  Work-up reviewed today  Monitor labs per LTACH schedule  Calcitriol  Continue to monitor renal function has improved 9/25 and 9/28 with adjustments in medication  Restart bumex daily and check bmp/mag on Monday    Willy Arteaga MD  9/30/2023  18:11 CDT

## 2023-09-30 NOTE — PROGRESS NOTES
[facility]  NIMESH Parkinson APRN        Internal Medicine Progress Note    9/30/2023   08:51 CDT    Name:  Erick Luong  MRN:    [id number]     Acct:     [id number]   Room:  [address] [place] [address] Day: 0     Admit Date: 9/18/2023  6:30 PM  PCP: Del Shetty MD    Subjective:     C/C: weakness, fatigue    Interval History: Status:  improved. Sleeping in recliner. No family at bedside. Woke from sleep. No complaints at present. AFebrile. BS elevated but stable.     Review of Systems   Constitutional: Positive for malaise/fatigue. Negative for chills, decreased appetite, weight gain and weight loss.   HENT:  Negative for congestion, ear discharge, hoarse voice and tinnitus.    Eyes:  Negative for blurred vision, discharge, visual disturbance and visual halos.   Cardiovascular:  Negative for chest pain, claudication, dyspnea on exertion, irregular heartbeat, leg swelling, orthopnea and paroxysmal nocturnal dyspnea.   Respiratory:  Negative for cough, shortness of breath, sputum production and wheezing.    Endocrine: Negative for cold intolerance, heat intolerance and polyuria.   Hematologic/Lymphatic: Negative for adenopathy. Does not bruise/bleed easily.   Skin:  Positive for poor wound healing. Negative for dry skin, itching and suspicious lesions.   Musculoskeletal:  Positive for neck pain. Negative for arthritis, back pain, falls, joint pain, muscle weakness and myalgias.   Gastrointestinal:  Negative for abdominal pain, constipation, diarrhea, dysphagia and hematemesis.   Genitourinary:  Negative for bladder incontinence, dysuria and frequency.   Neurological:  Positive for weakness. Negative for aphonia, disturbances in coordination and dizziness.   Psychiatric/Behavioral:  Negative for altered mental status, depression, memory loss and substance abuse. The patient does not have insomnia and is not nervous/anxious.        Medications:     Allergies:   Allergies   Allergen Reactions     "Cefepime Hives and Anaphylaxis    Bactrim [Sulfamethoxazole-Trimethoprim] Other (See Comments)     \"RENAL FAILURE\"    Vancomycin Itching    Zolpidem Unknown - High Severity     \"makes him crazy\"    Zolpidem Tartrate Unknown - Low Severity and Provider Review Needed    Metronidazole Rash       Current Meds:   Current Facility-Administered Medications:     acetaminophen (TYLENOL) tablet 650 mg, 650 mg, Oral, Q4H PRN **OR** acetaminophen (TYLENOL) suppository 650 mg, 650 mg, Rectal, Q4H PRN, Juan Manuel Page MD    aluminum-magnesium hydroxide-simethicone (MAALOX MAX) 400-400-40 MG/5ML suspension 30 mL, 30 mL, Oral, Q4H PRN, Juan Manuel Page MD    apixaban (ELIQUIS) tablet 5 mg, 5 mg, Oral, BID With Meals, Juan Manuel Page MD    busPIRone (BUSPAR) tablet 10 mg, 10 mg, Oral, Q12H PRN, Juan Manuel Page MD    calcitriol (ROCALTROL) capsule 0.5 mcg, 0.5 mcg, Oral, Daily, Juan Manuel Page MD    carvedilol (COREG) tablet 3.125 mg, 3.125 mg, Oral, BID With Meals, Juan Manuel Page MD    cefTRIAXone (ROCEPHIN) 1,000 mg in sodium chloride 0.9 % 100 mL IVPB, 1,000 mg, Intravenous, Q24H, Juan Manuel Page MD    cholecalciferol (VITAMIN D3) tablet 5,000 Units, 5,000 Units, Oral, Daily, Juan Manuel Page MD    dextrose (D50W) (25 g/50 mL) IV injection 25 g, 25 g, Intravenous, Q15 Min PRN, Juan Manuel Page MD    dextrose (GLUTOSE) oral gel 15 g, 15 g, Oral, Q15 Min PRN, Juan Manuel Page MD    docusate sodium (COLACE) capsule 200 mg, 200 mg, Oral, BID, Juan Manuel Page MD    donepezil (ARICEPT) tablet 10 mg, 10 mg, Oral, Daily, Juan Manuel Page MD    Dulaglutide solution pen-injector 4.5 mg, 4.5 mg, Subcutaneous, Weekly, Juan Manuel Page MD    glucagon (GLUCAGEN) injection 1 mg, 1 mg, Intramuscular, Q15 Min PRN, Juan Manuel Page MD    HYDROmorphone (DILAUDID) injection 1 mg, 1 mg, Intravenous, Q6H PRN, Juan Manuel Page MD    insulin detemir " (LEVEMIR) injection 25 Units, 25 Units, Subcutaneous, Daily, Shruthi Isabel APRN    Insulin Lispro (humaLOG) injection 2-9 Units, 2-9 Units, Subcutaneous, 4x Daily AC & at Bedtime, Juan Manuel Page MD    isosorbide mononitrate (IMDUR) 24 hr tablet 30 mg, 30 mg, Oral, Q24H, Joanie Mendes APRN    Lidocaine HCl 4 % gel gel 1 application , 1 application , Topical, PRN, Juan Manuel Page MD    magnesium hydroxide (MILK OF MAGNESIA) 400 MG/5ML suspension 15 mL, 15 mL, Oral, Daily PRN, Juan Manuel Page MD    melatonin tablet 6 mg, 6 mg, Oral, Nightly, Shruthi Isabel APRN    metoclopramide (REGLAN) tablet 5 mg, 5 mg, Oral, TID AC, Juan Manuel Page MD    ondansetron (ZOFRAN) tablet 4 mg, 4 mg, Oral, Q6H PRN **OR** ondansetron (ZOFRAN) injection 4 mg, 4 mg, Intravenous, Q6H PRN, Juan Manuel Page MD    oxyCODONE (oxyCONTIN) 12 hr tablet 10 mg, 10 mg, Oral, Q12H, Juan Manuel Page MD    oxyCODONE (ROXICODONE) immediate release tablet 5 mg, 5 mg, Oral, Q4H PRN, Juan Manuel Page MD    pantoprazole (PROTONIX) EC tablet 40 mg, 40 mg, Oral, BID, Juan Manuel Page MD    polyethylene glycol (MIRALAX) packet 17 g, 17 g, Oral, Daily, Juan Manuel Page MD    pregabalin (LYRICA) capsule 100 mg, 100 mg, Oral, Daily, Juan Manuel Page MD    pregabalin (LYRICA) capsule 200 mg, 200 mg, Oral, Nightly, Juan Manuel Page MD    rosuvastatin (CRESTOR) tablet 40 mg, 40 mg, Oral, Nightly, Juan Manuel Page MD    saccharomyces boulardii (FLORASTOR) capsule 250 mg, 250 mg, Oral, BID, Juan Manuel Page MD    sennosides-docusate (PERICOLACE) 8.6-50 MG per tablet 1 tablet, 1 tablet, Oral, Nightly, Juan Manuel Page MD    sodium chloride 0.9 % flush 10 mL, 10 mL, Intravenous, Q12H, Juan Manuel Page MD    sodium chloride 0.9 % flush 10 mL, 10 mL, Intravenous, PRN, Juan Manuel Page MD    sucralfate (CARAFATE) tablet 1 g, 1 g, Oral, TID AC, Juan Manuel Page  MD Santana    tamsulosin (FLOMAX) 24 hr capsule 0.4 mg, 0.4 mg, Oral, Daily, Juan Manuel Page MD    valsartan (DIOVAN) tablet 40 mg, 40 mg, Oral, Nightly, Juan Manuel Page MD    Data:     Code Status:    There are no questions and answers to display.       Family History   Problem Relation Age of Onset    Colon cancer Father     Heart disease Father     Colon cancer Sister     Colon polyps Sister     Alzheimer's disease Mother     Coronary artery disease Sister     Coronary artery disease Sister        Social History     Socioeconomic History    Marital status:    Tobacco Use    Smoking status: Former     Types: Cigarettes     Quit date:      Years since quittin.7    Smokeless tobacco: Never    Tobacco comments:     smoked in [employer]   Vaping Use    Vaping Use: Never used   Substance and Sexual Activity    Alcohol use: No    Drug use: No    Sexual activity: Defer       Vitals:  T 97.6 P 64 R 18 Bp 139/53 Sp02 100% (room air)            I/O (24Hr):  No intake or output data in the 24 hours ending 23 0851    Labs and imaging:      Recent Results (from the past 12 hour(s))   POC Glucose Once    Collection Time: 23 10:51 PM    Specimen: Blood   Result Value Ref Range    Glucose 192 (H) 70 - 130 mg/dL   POC Glucose Once    Collection Time: 23  7:44 AM    Specimen: Blood   Result Value Ref Range    Glucose 218 (H) 70 - 130 mg/dL                               Physical Examination:        Physical Exam  Vitals and nursing note reviewed.   Constitutional:       Appearance: Normal appearance. He is well-developed. He is obese.   HENT:      Head: Normocephalic and atraumatic.      Right Ear: External ear normal.      Left Ear: External ear normal.      Nose: Nose normal.      Mouth/Throat:      Mouth: Mucous membranes are moist.      Pharynx: Oropharynx is clear.   Eyes:      Pupils: Pupils are equal, round, and reactive to light.   Cardiovascular:      Rate and Rhythm: Normal  rate. Rhythm irregular.      Heart sounds: Normal heart sounds.   Pulmonary:      Effort: Pulmonary effort is normal.      Breath sounds: Normal breath sounds.   Abdominal:      General: Bowel sounds are normal.      Palpations: Abdomen is soft.      Comments: obese   Musculoskeletal:         General: Normal range of motion.      Cervical back: Normal range of motion and neck supple.      Comments: Generalized weakness   Skin:     General: Skin is warm and dry.      Comments: Wound vac intact   Neurological:      Mental Status: He is alert and oriented to person, place, and time.      Deep Tendon Reflexes: Reflexes are normal and symmetric.   Psychiatric:         Behavior: Behavior normal.         Assessment:            * No active hospital problems. *    Past Medical History:   Diagnosis Date    Arthritis     Autonomic disease     CAD (coronary artery disease) 02/06/2017    Cervical radiculopathy 09/16/2021    Chronic constipation with acute exaccerbation 05/10/2021    Coronary artery disease     Degeneration of cervical intervertebral disc 08/11/2021    Diabetes mellitus     Diabetic foot ulcer 08/31/2020    Diabetic polyneuropathy associated with type 2 diabetes mellitus 01/18/2021    Elevated cholesterol     Gastroesophageal reflux disease 05/13/2019    Headache     HTN (hypertension), benign 05/03/2017    Hyperlipidemia     Hypertension     Mixed hyperlipidemia 02/07/2017    Multiple lung nodules 01/26/2020    5mm, 9 mm RLL identified 1/2020, not present 10/2019.    Myocardial infarction     Osteomyelitis 01/22/2020    Osteomyelitis of fifth toe of right foot 10/07/2019    Pancreatitis     Persistent insomnia 01/20/2020    Renal disorder     Sleep apnea 02/06/2017    Sleep apnea with use of continuous positive airway pressure (CPAP)     NON-COMPLIANT    Slow transit constipation 01/16/2019    Spinal stenosis in cervical region 09/16/2021    Vitamin D deficiency 03/02/2021        Plan:        Proteus wound  infection s/p debridement  New onset atrial fibrillation  Acute on chronic diastolic CHF  CKD3  Multifactorial anemia  DM2 with hyperglycemia on long term insulin  BRITTNI  Chronic pain syndrome  Chronic neck pain with radiculopathy  Diabetic peripheral neuropathy  Hx CAD  GERD  Possible gastroparesis  Sepsis secondary to wound infection  Continue current treatment. Monitor counts. Increase activity. Labs Monday. Wound care per wound care team. Continue IV antibiotics. Appreciate nephrology evaluation and treatment. Continue glycemic control efforts. Maintain patient safety. Continue pain control efforts. Appreciate cardiology evaluation and recommendations.       Electronically signed by SUSAN Bynum on 9/30/2023 at 08:51 CDT     I have discussed the care of Erick Luong, including pertinent history and exam findings, with the nurse practitioner.    I have seen and examined the patient and the key elements of all parts of the encounter have been performed by me.  I agree with the assessment, plan and orders as documented by SUSAN Pryor, after I modified the exam findings and the plan of treatments and the final version is my approved version of the assessment.        Electronically signed by Juan Manuel Page MD on 10/1/2023 at 07:36 CDT

## 2023-10-01 LAB
GLUCOSE BLDC GLUCOMTR-MCNC: 122 MG/DL (ref 70–130)
GLUCOSE BLDC GLUCOMTR-MCNC: 216 MG/DL (ref 70–130)
GLUCOSE BLDC GLUCOMTR-MCNC: 222 MG/DL (ref 70–130)
GLUCOSE BLDC GLUCOMTR-MCNC: 242 MG/DL (ref 70–130)

## 2023-10-01 PROCEDURE — 25010000002 CEFTRIAXONE PER 250 MG: Performed by: INTERNAL MEDICINE

## 2023-10-01 PROCEDURE — 63710000001 INSULIN LISPRO (HUMAN) PER 5 UNITS: Performed by: INTERNAL MEDICINE

## 2023-10-01 PROCEDURE — 82948 REAGENT STRIP/BLOOD GLUCOSE: CPT

## 2023-10-01 PROCEDURE — 97110 THERAPEUTIC EXERCISES: CPT

## 2023-10-01 PROCEDURE — 25010000002 HYDROMORPHONE 1 MG/ML SOLUTION: Performed by: INTERNAL MEDICINE

## 2023-10-01 PROCEDURE — 25010000002 ONDANSETRON PER 1 MG: Performed by: INTERNAL MEDICINE

## 2023-10-01 PROCEDURE — 63710000001 INSULIN DETEMIR PER 5 UNITS: Performed by: NURSE PRACTITIONER

## 2023-10-01 PROCEDURE — 97116 GAIT TRAINING THERAPY: CPT

## 2023-10-01 NOTE — PROGRESS NOTES
Nephrology ([facility]) Progress Note      Patient:  Erick Luong  YOB: 1955  Date of Service: 10/1/2023  MRN: [id number]   Acct: [id number]   Primary Care Physician: Del Shetty MD  Advance Directive:   There are no questions and answers to display.     Admit Date: 9/18/2023       Hospital Day: 0  Referring Provider: Juan Manuel Page MD      Patient personally seen and examined.  Complete chart including Consults, Notes, Operative Reports, Labs, Cardiology, and Radiology studies reviewed as able.        Subjective:  Erick Luong is a 68 y.o. male for whom we were consulted for evaluation and treatment of chronic kidney disease stage IIIb.  He has stage IIIb chronic kidney disease baseline and follows with Dr. oLmas in the office as he has diabetic nephropathy.  He has a history of insulin-dependent type 2 diabetes, hypertension, abdominal obesity, obstructive sleep apnea, diabetic foot ulcer and coronary artery disease.  He was accepted as a transfer from [facility].  Patient was admitted at [facility] by Dr. Gomes and extensive debridement of left foot wound at plantar aspect.  Postoperative wound measuring 6 x 17 x 4 cm with exposed bone.  Surgical culture returned positive for Proteus [place]ills.  Patient needed long-term IV antibiotics.  He is now admitted for wound care, long-term IV antibiotics, chronic kidney disease and treatment of diastolic congestive heart failure.  Patient also has history of gastroparesis.      Today, no overnight events.  Seen with nursing.  Up in chair.  Tolerated Bumex with increased urine output.  No other complaints appreciated.    Temp- 97.5  Pulse- 63  Resp- 18  BP- 128/49  O2%- 98      Allergies:  Cefepime, Bactrim [sulfamethoxazole-trimethoprim], Vancomycin, Zolpidem, Zolpidem tartrate, and Metronidazole    Home Meds:  Medications Prior to Admission   Medication Sig Dispense Refill Last Dose    amitriptyline  (ELAVIL) 25 MG tablet Take 2 tablets by mouth Daily at bedtime. 60 tablet 1     amoxicillin-clavulanate (Augmentin) 500-125 MG per tablet Take 1 tablet by mouth every 12 hours with meals for 7 days. 14 tablet 0     ascorbic acid (VITAMIN C) 1000 MG tablet Take 1 tablet by mouth Daily. 30 tablet 3     Aspirin 81 MG capsule Take 81 mg by mouth Daily.       bumetanide (BUMEX) 2 MG tablet Take 1 tablet by mouth Daily. Take 2 tablets in morning;1 tablet at night       busPIRone (BUSPAR) 10 MG tablet Take 1 tablet by mouth 2 (Two) Times a Day As Needed. 100 tablet 3     calcitriol (ROCALTROL) 0.5 MCG capsule Take 1 capsule by mouth Daily. 90 capsule 4     carvedilol (COREG) 6.25 MG tablet Take 1 tablet by mouth 2 (Two) Times a Day. 180 tablet 4     Cholecalciferol (D-5000) 125 MCG (5000 UT) tablet Take 1 tablet by mouth Daily Before Lunch. 30 tablet 2     cloNIDine (CATAPRES) 0.1 MG tablet Take 1 tablet by mouth Every 12 (Twelve) Hours. 60 tablet 2     Diclofenac Sodium (VOLTAREN) 1 % gel gel Apply 2 g topically to the appropriate area as directed 4 (Four) Times a Day As Needed. 300 g 11     donepezil (ARICEPT) 10 MG tablet Take 1 tablet by mouth Daily. 90 tablet 4     Dulaglutide (Trulicity) 4.5 MG/0.5ML solution pen-injector Inject 0.5 mL under the skin into the appropriate area as directed 1 (One) Time Per Week. 2 mL 11     empagliflozin (JARDIANCE) 25 MG tablet tablet Take 1 tablet by mouth Daily. 30 tablet 2     HYDROcodone-acetaminophen (NORCO) 7.5-325 MG per tablet Take 1 tablet by mouth 2 (Two) Times a Day As Needed. 40 tablet 0     Insulin Regular Human, Conc, (HumuLIN R U-500 KwikPen) 500 UNIT/ML solution pen-injector CONCENTRATED injection Inject 120 Units under the skin into the appropriate area as directed 2 (Two) Times a Day with breakfast and dinner. (Patient taking differently: Inject 120 Units under the skin into the appropriate area as directed 3 (Three) Times a Day Before Meals.) 18 mL 5     midodrine  (PROAMATINE) 10 MG tablet Take 1 tablet by mouth 3 (Three) Times a Day. 270 tablet 4     ondansetron ODT (ZOFRAN-ODT) 8 MG disintegrating tablet Place 1 tablet under tongue 3 times a day as needed. 25 tablet 1     pantoprazole (Protonix) 40 MG EC tablet Take 1 tablet by mouth 2 (Two) Times a Day. 180 tablet 4     polyethylene glycol (MIRALAX) 17 g packet Take 17 g by mouth Daily. Obtain OTC       pregabalin (LYRICA) 100 MG capsule Take 1 capsule by mouth Daily With Breakfast AND 2 capsules Every Night. 90 capsule 2     rosuvastatin (CRESTOR) 40 MG tablet Take 1 tablet by mouth Every Night. 90 tablet 3     sennosides-docusate (PERICOLACE) 8.6-50 MG per tablet Take 1 tablet by mouth Every Night. Obtain OTC       sennosides-docusate (PERICOLACE) 8.6-50 MG per tablet Take 1 tablet by mouth every night at bedtime. 30 tablet 2     sodium hypochlorite (DAKIN'S 1/4 STRENGTH) 0.125 % solution topical solution 0.125% Apply to affected area twice daily 473 mL 3     tamsulosin (FLOMAX) 0.4 MG capsule 24 hr capsule Take 1 capsule by mouth Daily. 90 capsule 1        Medicines:  Current Facility-Administered Medications   Medication Dose Route Frequency Provider Last Rate Last Admin    acetaminophen (TYLENOL) tablet 650 mg  650 mg Oral Q4H PRN Juan Maunel Page MD        Or    acetaminophen (TYLENOL) suppository 650 mg  650 mg Rectal Q4H PRN Juan Manuel Page MD        aluminum-magnesium hydroxide-simethicone (MAALOX MAX) 400-400-40 MG/5ML suspension 30 mL  30 mL Oral Q4H PRN Juan Manuel Page MD        apixaban (ELIQUIS) tablet 5 mg  5 mg Oral BID With Meals Juan Manuel Page MD        bumetanide (BUMEX) tablet 1 mg  1 mg Oral Daily Willy Arteaga MD        busPIRone (BUSPAR) tablet 10 mg  10 mg Oral Q12H PRN Juan Manuel Page MD        calcitriol (ROCALTROL) capsule 0.5 mcg  0.5 mcg Oral Daily Juan Manuel Page MD        carvedilol (COREG) tablet 3.125 mg  3.125 mg Oral BID With Meals  Juan Manuel Page MD        cefTRIAXone (ROCEPHIN) 1,000 mg in sodium chloride 0.9 % 100 mL IVPB  1,000 mg Intravenous Q24H Juan Manuel Page MD        cholecalciferol (VITAMIN D3) tablet 5,000 Units  5,000 Units Oral Daily Juan Manuel Page MD        dextrose (D50W) (25 g/50 mL) IV injection 25 g  25 g Intravenous Q15 Min PRN Juan Manuel Page MD        dextrose (GLUTOSE) oral gel 15 g  15 g Oral Q15 Min PRN Juan Manuel Page MD        docusate sodium (COLACE) capsule 200 mg  200 mg Oral BID Juan Manuel Page MD        donepezil (ARICEPT) tablet 10 mg  10 mg Oral Daily Juan Manuel Page MD        Dulaglutide solution pen-injector 4.5 mg  4.5 mg Subcutaneous Weekly Juan Manuel Page MD        glucagon (GLUCAGEN) injection 1 mg  1 mg Intramuscular Q15 Min PRN Juan Manuel Page MD        HYDROmorphone (DILAUDID) injection 1 mg  1 mg Intravenous Q6H PRN Juan Manuel Page MD        HYDROmorphone (DILAUDID) injection 1 mg  1 mg Intravenous Q6H PRN Juan Manuel Page MD        insulin detemir (LEVEMIR) injection 25 Units  25 Units Subcutaneous Daily Shruthi Isabel APRN        Insulin Lispro (humaLOG) injection 2-9 Units  2-9 Units Subcutaneous 4x Daily AC & at Bedtime Juan Manuel Page MD        isosorbide mononitrate (IMDUR) 24 hr tablet 30 mg  30 mg Oral Q24H Joanie Mendes APRN        Lidocaine HCl 4 % gel gel 1 application   1 application  Topical PRN Juan Manuel Page MD        magnesium hydroxide (MILK OF MAGNESIA) 400 MG/5ML suspension 15 mL  15 mL Oral Daily PRN Juan Manuel Page MD        melatonin tablet 6 mg  6 mg Oral Nightly Shruthi Isabel APRN        metoclopramide (REGLAN) tablet 5 mg  5 mg Oral TID AC Juan Manuel Page MD        ondansetron (ZOFRAN) tablet 4 mg  4 mg Oral Q6H PRN Juan Manuel Page MD        Or    ondansetron (ZOFRAN) injection 4 mg  4 mg Intravenous Q6H PRN Juan Manuel Page MD         oxyCODONE (oxyCONTIN) 12 hr tablet 10 mg  10 mg Oral Q12H Juan Manuel Page MD        oxyCODONE (ROXICODONE) immediate release tablet 5 mg  5 mg Oral Q4H PRN Juan Manuel Page MD        oxyCODONE (ROXICODONE) immediate release tablet 5 mg  5 mg Oral Q4H PRN Juan Manuel Page MD        pantoprazole (PROTONIX) EC tablet 40 mg  40 mg Oral BID Juan Manuel Page MD        polyethylene glycol (MIRALAX) packet 17 g  17 g Oral Daily Juan Manuel Page MD        pregabalin (LYRICA) capsule 100 mg  100 mg Oral Daily Juan Manuel Page MD        pregabalin (LYRICA) capsule 200 mg  200 mg Oral Nightly Juan Manuel Page MD        rosuvastatin (CRESTOR) tablet 40 mg  40 mg Oral Nightly Juan Manuel Page MD        saccharomyces boulardii (FLORASTOR) capsule 250 mg  250 mg Oral BID Juan Manuel Page MD        sennosides-docusate (PERICOLACE) 8.6-50 MG per tablet 1 tablet  1 tablet Oral Nightly Juan Manuel Page MD        sodium chloride 0.9 % flush 10 mL  10 mL Intravenous Q12H Juan Manuel Page MD        sodium chloride 0.9 % flush 10 mL  10 mL Intravenous PRN Juan Manuel Page MD        sucralfate (CARAFATE) tablet 1 g  1 g Oral TID AC Juan Manuel Page MD        tamsulosin (FLOMAX) 24 hr capsule 0.4 mg  0.4 mg Oral Daily Juan Manuel Page MD        valsartan (DIOVAN) tablet 40 mg  40 mg Oral Nightly Juan Manuel Page MD           Past Medical History:  Past Medical History:   Diagnosis Date    Arthritis     Autonomic disease     CAD (coronary artery disease) 02/06/2017    Cervical radiculopathy 09/16/2021    Chronic constipation with acute exaccerbation 05/10/2021    Coronary artery disease     Degeneration of cervical intervertebral disc 08/11/2021    Diabetes mellitus     Diabetic foot ulcer 08/31/2020    Diabetic polyneuropathy associated with type 2 diabetes mellitus 01/18/2021    Elevated cholesterol     Gastroesophageal reflux disease 05/13/2019     Headache     HTN (hypertension), benign 05/03/2017    Hyperlipidemia     Hypertension     Mixed hyperlipidemia 02/07/2017    Multiple lung nodules 01/26/2020    5mm, 9 mm RLL identified 1/2020, not present 10/2019.    Myocardial infarction     Osteomyelitis 01/22/2020    Osteomyelitis of fifth toe of right foot 10/07/2019    Pancreatitis     Persistent insomnia 01/20/2020    Renal disorder     Sleep apnea 02/06/2017    Sleep apnea with use of continuous positive airway pressure (CPAP)     NON-COMPLIANT    Slow transit constipation 01/16/2019    Spinal stenosis in cervical region 09/16/2021    Vitamin D deficiency 03/02/2021       Past Surgical History:  Past Surgical History:   Procedure Laterality Date    ABDOMINAL SURGERY      AMPUTATION FOOT / TOE Left 10/2021    5th digit     ANTERIOR CERVICAL DISCECTOMY W/ FUSION N/A 8/5/2022    Procedure: CERVICAL DISCECTOMY ANTERIOR WITH FUSION C5-6 with possible plating of C5-7 with neuromonitoring and 1 c-arm;  Surgeon: Karel Soliz MD;  Location:  [facility] OR;  Service: Neurosurgery;  Laterality: N/A;    APPENDECTOMY      BACK SURGERY      CARDIAC CATHETERIZATION Left 02/08/2021    Procedure: Left Heart Cath w poss intervention left anatomical snuff box acess;  Surgeon: Omkar Charles DO;  Location:  [facility] CATH INVASIVE LOCATION;  Service: Cardiology;  Laterality: Left;    CARDIAC SURGERY      CATARACT EXTRACTION      CERVICAL SPINE SURGERY      COLONOSCOPY N/A 01/31/2017    Normal exam repeat in 5 years    COLONOSCOPY N/A 02/11/2019    Mild acute inflammation    COLONOSCOPY W/ POLYPECTOMY  03/04/2014    Hyperplastic polyp    CORONARY ARTERY BYPASS GRAFT  10/2015    ENDOSCOPY  04/13/2011    Gastritis with hemorrhage    ENDOSCOPY N/A 05/05/2017    Normal exam    ENDOSCOPY N/A 02/11/2019    Gastritis    ENDOSCOPY N/A 09/01/2020    Non-erosive gastritis with hemorrhage    ENDOSCOPY N/A 02/10/2021    Esophagitis    FOOT SURGERY Left     INCISION AND DRAINAGE OF  WOUND Left 2007    spider bite       Family History  Family History   Problem Relation Age of Onset    Colon cancer Father     Heart disease Father     Colon cancer Sister     Colon polyps Sister     Alzheimer's disease Mother     Coronary artery disease Sister     Coronary artery disease Sister        Social History  Social History     Socioeconomic History    Marital status:    Tobacco Use    Smoking status: Former     Types: Cigarettes     Quit date:      Years since quittin.7    Smokeless tobacco: Never    Tobacco comments:     smoked in highschool   Vaping Use    Vaping Use: Never used   Substance and Sexual Activity    Alcohol use: No    Drug use: No    Sexual activity: Defer       Review of Systems:  History obtained from chart review and the patient  General ROS: No fever or chills  Respiratory ROS: No cough, shortness of breath, wheezing  Cardiovascular ROS: No chest pain or palpitations  Gastrointestinal ROS: No abdominal pain or melena  Genito-Urinary ROS: No dysuria or hematuria  Psych ROS: No anxiety and depression  14 point ROS reviewed with the patient and negative except as noted above and in the HPI unless unable to obtain.    Objective:  No data found.    No intake or output data in the 24 hours ending 10/01/23 1847  General: awake/alert   Chest:  clear to auscultation bilaterally without respiratory distress  CVS: regular rate and rhythm  Abdominal: soft, nontender, positive bowel sounds  Extremities: lle edema  Skin: lle wound vac, warm/dry      Labs:  Results from last 7 days   Lab Units 23  0530 23  0627   WBC 10*3/mm3 6.49 6.70   HEMOGLOBIN g/dL 9.2* 9.1*   HEMATOCRIT % 30.8* 30.3*   PLATELETS 10*3/mm3 260 288         Results from last 7 days   Lab Units 23  0530 23  0627   SODIUM mmol/L 137 141   POTASSIUM mmol/L 4.7 4.6   CHLORIDE mmol/L 103 104   CO2 mmol/L 24.0 26.0   BUN mg/dL 29* 32*   CREATININE mg/dL 2.09* 2.19*   CALCIUM mg/dL 9.0 9.4   EGFR  mL/min/1.73 34.1* 32.2*   GLUCOSE mg/dL 384* 93       Radiology:   Imaging Results (Last 72 Hours)       ** No results found for the last 72 hours. **            Culture:  No results found for: BLOODCX, URINECX, WOUNDCX, MRSACX, RESPCX, STOOLCX      Assessment   Acute kidney injury/ATN  Chronic kidney disease stage IIIb  Diabetes type 2 with diabetic nephropathy  Diabetic foot ulcer with surgical debridement and wound VAC  Chronic diastolic congestive heart failure  Wound infection with Proteus  Gastroparesis  Secondary hyperparathyroidism  Anemia and chronic kidney disease  Chest Pain    Plan:  Discussed with patient, nursing  Work-up reviewed today  Monitor labs per LTACH schedule  Calcitriol  Continue to monitor renal function has improved 9/25 and 9/28 with adjustments in medication  Restarted bumex daily and check bmp/mag on Monday    Willy Arteaga MD  10/1/2023  18:47 CDT

## 2023-10-01 NOTE — PROGRESS NOTES
[facility]  NIMESH Parkinson APRN        Internal Medicine Progress Note    10/1/2023   02:11 CDT    Name:  Erick Luong  MRN:    [id number]     Acct:     [id number]   Room:  [address] [place] [address] Day: 0     Admit Date: 9/18/2023  6:30 PM  PCP: Del Shetty MD    Subjective:     C/C: weakness, fatigue    Interval History: Status:  improved. Sleeping in recliner. No family at bedside. Woke from sleep. Denies needs at present. Afebrile. BS elevated.     Review of Systems   Constitutional: Positive for malaise/fatigue. Negative for chills, decreased appetite, weight gain and weight loss.   HENT:  Negative for congestion, ear discharge, hoarse voice and tinnitus.    Eyes:  Negative for blurred vision, discharge, visual disturbance and visual halos.   Cardiovascular:  Negative for chest pain, claudication, dyspnea on exertion, irregular heartbeat, leg swelling, orthopnea and paroxysmal nocturnal dyspnea.   Respiratory:  Negative for cough, shortness of breath, sputum production and wheezing.    Endocrine: Negative for cold intolerance, heat intolerance and polyuria.   Hematologic/Lymphatic: Negative for adenopathy. Does not bruise/bleed easily.   Skin:  Positive for poor wound healing. Negative for dry skin, itching and suspicious lesions.   Musculoskeletal:  Positive for neck pain. Negative for arthritis, back pain, falls, joint pain, muscle weakness and myalgias.   Gastrointestinal:  Negative for abdominal pain, constipation, diarrhea, dysphagia and hematemesis.   Genitourinary:  Negative for bladder incontinence, dysuria and frequency.   Neurological:  Positive for weakness. Negative for aphonia, disturbances in coordination and dizziness.   Psychiatric/Behavioral:  Negative for altered mental status, depression, memory loss and substance abuse. The patient does not have insomnia and is not nervous/anxious.        Medications:     Allergies:   Allergies   Allergen Reactions    Cefepime Hives and  "Anaphylaxis    Bactrim [Sulfamethoxazole-Trimethoprim] Other (See Comments)     \"RENAL FAILURE\"    Vancomycin Itching    Zolpidem Unknown - High Severity     \"makes him crazy\"    Zolpidem Tartrate Unknown - Low Severity and Provider Review Needed    Metronidazole Rash       Current Meds:   Current Facility-Administered Medications:     acetaminophen (TYLENOL) tablet 650 mg, 650 mg, Oral, Q4H PRN **OR** acetaminophen (TYLENOL) suppository 650 mg, 650 mg, Rectal, Q4H PRN, Juan Manuel Page MD    aluminum-magnesium hydroxide-simethicone (MAALOX MAX) 400-400-40 MG/5ML suspension 30 mL, 30 mL, Oral, Q4H PRN, Juan Manuel Page MD    apixaban (ELIQUIS) tablet 5 mg, 5 mg, Oral, BID With Meals, Juan Manuel Page MD    bumetanide (BUMEX) tablet 1 mg, 1 mg, Oral, Daily, Willy Arteaga MD    busPIRone (BUSPAR) tablet 10 mg, 10 mg, Oral, Q12H PRN, Juan Manuel Page MD    calcitriol (ROCALTROL) capsule 0.5 mcg, 0.5 mcg, Oral, Daily, Juan Manuel Page MD    carvedilol (COREG) tablet 3.125 mg, 3.125 mg, Oral, BID With Meals, Juan Manuel Page MD    cefTRIAXone (ROCEPHIN) 1,000 mg in sodium chloride 0.9 % 100 mL IVPB, 1,000 mg, Intravenous, Q24H, Juan Manuel Page MD    cholecalciferol (VITAMIN D3) tablet 5,000 Units, 5,000 Units, Oral, Daily, Juan Manuel Page MD    dextrose (D50W) (25 g/50 mL) IV injection 25 g, 25 g, Intravenous, Q15 Min PRN, Juan Manuel Page MD    dextrose (GLUTOSE) oral gel 15 g, 15 g, Oral, Q15 Min PRN, Juan Manuel Page MD    docusate sodium (COLACE) capsule 200 mg, 200 mg, Oral, BID, Juan Manuel Page MD    donepezil (ARICEPT) tablet 10 mg, 10 mg, Oral, Daily, Juan Manuel Page MD    Dulaglutide solution pen-injector 4.5 mg, 4.5 mg, Subcutaneous, Weekly, Juan Manuel Page MD    glucagon (GLUCAGEN) injection 1 mg, 1 mg, Intramuscular, Q15 Min PRN, Juan Manuel Page MD    HYDROmorphone (DILAUDID) injection 1 mg, 1 mg, " Intravenous, Q6H PRN, Juan Manuel Page MD    HYDROmorphone (DILAUDID) injection 1 mg, 1 mg, Intravenous, Q6H PRN, Juan Manuel Page MD    insulin detemir (LEVEMIR) injection 25 Units, 25 Units, Subcutaneous, Daily, Shruthi Isabel, SUSAN    Insulin Lispro (humaLOG) injection 2-9 Units, 2-9 Units, Subcutaneous, 4x Daily AC & at Bedtime, Juan Manuel Page MD    isosorbide mononitrate (IMDUR) 24 hr tablet 30 mg, 30 mg, Oral, Q24H, Joanie Mendes APRN    Lidocaine HCl 4 % gel gel 1 application , 1 application , Topical, PRN, Juan Manuel Page MD    magnesium hydroxide (MILK OF MAGNESIA) 400 MG/5ML suspension 15 mL, 15 mL, Oral, Daily PRN, Juan Manuel Page MD    melatonin tablet 6 mg, 6 mg, Oral, Nightly, Shruthi Isabel, SUSAN    metoclopramide (REGLAN) tablet 5 mg, 5 mg, Oral, TID AC, Juan Manuel Page MD    ondansetron (ZOFRAN) tablet 4 mg, 4 mg, Oral, Q6H PRN **OR** ondansetron (ZOFRAN) injection 4 mg, 4 mg, Intravenous, Q6H PRN, Juan Manuel Page MD    oxyCODONE (oxyCONTIN) 12 hr tablet 10 mg, 10 mg, Oral, Q12H, Juan Manuel Page MD    oxyCODONE (ROXICODONE) immediate release tablet 5 mg, 5 mg, Oral, Q4H PRN, Juan Manuel Page MD    oxyCODONE (ROXICODONE) immediate release tablet 5 mg, 5 mg, Oral, Q4H PRN, Juan Manuel Page MD    pantoprazole (PROTONIX) EC tablet 40 mg, 40 mg, Oral, BID, Juan Manuel Page MD    polyethylene glycol (MIRALAX) packet 17 g, 17 g, Oral, Daily, Juan Manuel Page MD    pregabalin (LYRICA) capsule 100 mg, 100 mg, Oral, Daily, Juan Manuel Page MD    pregabalin (LYRICA) capsule 200 mg, 200 mg, Oral, Nightly, Juan Manuel Page MD    rosuvastatin (CRESTOR) tablet 40 mg, 40 mg, Oral, Nightly, Juan Manuel Page MD    saccharomyces boulardii (FLORASTOR) capsule 250 mg, 250 mg, Oral, BID, Juan Manuel Page MD    sennosides-docusate (PERICOLACE) 8.6-50 MG per tablet 1 tablet, 1 tablet, Oral, Nightly, Camilo  Juan Manuel Dillon MD    sodium chloride 0.9 % flush 10 mL, 10 mL, Intravenous, Q12H, Juan Manuel Page MD    sodium chloride 0.9 % flush 10 mL, 10 mL, Intravenous, PRN, Juan Manuel Page MD    sucralfate (CARAFATE) tablet 1 g, 1 g, Oral, TID AC, Juan Manuel Page MD    tamsulosin (FLOMAX) 24 hr capsule 0.4 mg, 0.4 mg, Oral, Daily, Juan Manuel Page MD    valsartan (DIOVAN) tablet 40 mg, 40 mg, Oral, Nightly, Juan Manuel Page MD    Data:     Code Status:    There are no questions and answers to display.       Family History   Problem Relation Age of Onset    Colon cancer Father     Heart disease Father     Colon cancer Sister     Colon polyps Sister     Alzheimer's disease Mother     Coronary artery disease Sister     Coronary artery disease Sister        Social History     Socioeconomic History    Marital status:    Tobacco Use    Smoking status: Former     Types: Cigarettes     Quit date:      Years since quittin.7    Smokeless tobacco: Never    Tobacco comments:     smoked in [employer]   Vaping Use    Vaping Use: Never used   Substance and Sexual Activity    Alcohol use: No    Drug use: No    Sexual activity: Defer       Vitals:  T 97.5 P 63 R 18 Bp 128/49 Sp02 98% (room air)            I/O (24Hr):  No intake or output data in the 24 hours ending 10/01/23 0211    Labs and imaging:      Recent Results (from the past 12 hour(s))   POC Glucose Once    Collection Time: 23  4:49 PM    Specimen: Blood   Result Value Ref Range    Glucose 188 (H) 70 - 130 mg/dL   POC Glucose Once    Collection Time: 23  8:19 PM    Specimen: Blood   Result Value Ref Range    Glucose 244 (H) 70 - 130 mg/dL                               Physical Examination:        Physical Exam  Vitals and nursing note reviewed.   Constitutional:       Appearance: Normal appearance. He is well-developed. He is obese.   HENT:      Head: Normocephalic and atraumatic.      Right Ear: External ear normal.       Left Ear: External ear normal.      Nose: Nose normal.      Mouth/Throat:      Mouth: Mucous membranes are moist.      Pharynx: Oropharynx is clear.   Eyes:      Pupils: Pupils are equal, round, and reactive to light.   Cardiovascular:      Rate and Rhythm: Normal rate. Rhythm irregular.      Heart sounds: Normal heart sounds.   Pulmonary:      Effort: Pulmonary effort is normal.      Breath sounds: Normal breath sounds.   Abdominal:      General: Bowel sounds are normal.      Palpations: Abdomen is soft.      Comments: obese   Musculoskeletal:         General: Normal range of motion.      Cervical back: Normal range of motion and neck supple.      Comments: Generalized weakness   Skin:     General: Skin is warm and dry.      Comments: Wound vac intact   Neurological:      Mental Status: He is alert and oriented to person, place, and time.      Deep Tendon Reflexes: Reflexes are normal and symmetric.   Psychiatric:         Behavior: Behavior normal.         Assessment:            * No active hospital problems. *    Past Medical History:   Diagnosis Date    Arthritis     Autonomic disease     CAD (coronary artery disease) 02/06/2017    Cervical radiculopathy 09/16/2021    Chronic constipation with acute exaccerbation 05/10/2021    Coronary artery disease     Degeneration of cervical intervertebral disc 08/11/2021    Diabetes mellitus     Diabetic foot ulcer 08/31/2020    Diabetic polyneuropathy associated with type 2 diabetes mellitus 01/18/2021    Elevated cholesterol     Gastroesophageal reflux disease 05/13/2019    Headache     HTN (hypertension), benign 05/03/2017    Hyperlipidemia     Hypertension     Mixed hyperlipidemia 02/07/2017    Multiple lung nodules 01/26/2020    5mm, 9 mm RLL identified 1/2020, not present 10/2019.    Myocardial infarction     Osteomyelitis 01/22/2020    Osteomyelitis of fifth toe of right foot 10/07/2019    Pancreatitis     Persistent insomnia 01/20/2020    Renal disorder     Sleep  apnea 02/06/2017    Sleep apnea with use of continuous positive airway pressure (CPAP)     NON-COMPLIANT    Slow transit constipation 01/16/2019    Spinal stenosis in cervical region 09/16/2021    Vitamin D deficiency 03/02/2021        Plan:        Proteus wound infection s/p debridement  New onset atrial fibrillation  Acute on chronic diastolic CHF  CKD3  Multifactorial anemia  DM2 with hyperglycemia on long term insulin  BRITTNI  Chronic pain syndrome  Chronic neck pain with radiculopathy  Diabetic peripheral neuropathy  Hx CAD  GERD  Possible gastroparesis  Sepsis secondary to wound infection  Continue current treatment. Monitor counts. Increase activity. Labs Monday. Wound care per wound care team. Continue IV antibiotics. Appreciate nephrology evaluation and treatment. Continue glycemic control efforts. Maintain patient safety. Continue pain control efforts. Appreciate cardiology evaluation and recommendations.       Electronically signed by SUSAN Bynum on 10/1/2023 at 02:11 CDT

## 2023-10-02 LAB
ANION GAP SERPL CALCULATED.3IONS-SCNC: 10 MMOL/L (ref 5–15)
BASOPHILS # BLD AUTO: 0.07 10*3/MM3 (ref 0–0.2)
BASOPHILS NFR BLD AUTO: 1.1 % (ref 0–1.5)
BUN SERPL-MCNC: 31 MG/DL (ref 8–23)
BUN/CREAT SERPL: 14.6 (ref 7–25)
CALCIUM SPEC-SCNC: 9.1 MG/DL (ref 8.6–10.5)
CHLORIDE SERPL-SCNC: 102 MMOL/L (ref 98–107)
CO2 SERPL-SCNC: 25 MMOL/L (ref 22–29)
CREAT SERPL-MCNC: 2.12 MG/DL (ref 0.76–1.27)
CRP SERPL-MCNC: 1.69 MG/DL (ref 0–0.5)
DEPRECATED RDW RBC AUTO: 51.7 FL (ref 37–54)
EGFRCR SERPLBLD CKD-EPI 2021: 33.5 ML/MIN/1.73
EOSINOPHIL # BLD AUTO: 0.87 10*3/MM3 (ref 0–0.4)
EOSINOPHIL NFR BLD AUTO: 13.3 % (ref 0.3–6.2)
ERYTHROCYTE [DISTWIDTH] IN BLOOD BY AUTOMATED COUNT: 15.9 % (ref 12.3–15.4)
ERYTHROCYTE [SEDIMENTATION RATE] IN BLOOD: 55 MM/HR (ref 0–20)
GLUCOSE BLDC GLUCOMTR-MCNC: 192 MG/DL (ref 70–130)
GLUCOSE BLDC GLUCOMTR-MCNC: 236 MG/DL (ref 70–130)
GLUCOSE BLDC GLUCOMTR-MCNC: 239 MG/DL (ref 70–130)
GLUCOSE BLDC GLUCOMTR-MCNC: 255 MG/DL (ref 70–130)
GLUCOSE SERPL-MCNC: 213 MG/DL (ref 65–99)
HCT VFR BLD AUTO: 29.4 % (ref 37.5–51)
HGB BLD-MCNC: 9 G/DL (ref 13–17.7)
IMM GRANULOCYTES # BLD AUTO: 0.02 10*3/MM3 (ref 0–0.05)
IMM GRANULOCYTES NFR BLD AUTO: 0.3 % (ref 0–0.5)
LYMPHOCYTES # BLD AUTO: 1.63 10*3/MM3 (ref 0.7–3.1)
LYMPHOCYTES NFR BLD AUTO: 25 % (ref 19.6–45.3)
MAGNESIUM SERPL-MCNC: 2.5 MG/DL (ref 1.6–2.4)
MCH RBC QN AUTO: 27 PG (ref 26.6–33)
MCHC RBC AUTO-ENTMCNC: 30.6 G/DL (ref 31.5–35.7)
MCV RBC AUTO: 88.3 FL (ref 79–97)
MONOCYTES # BLD AUTO: 0.64 10*3/MM3 (ref 0.1–0.9)
MONOCYTES NFR BLD AUTO: 9.8 % (ref 5–12)
NEUTROPHILS NFR BLD AUTO: 3.29 10*3/MM3 (ref 1.7–7)
NEUTROPHILS NFR BLD AUTO: 50.5 % (ref 42.7–76)
NRBC BLD AUTO-RTO: 0 /100 WBC (ref 0–0.2)
PLATELET # BLD AUTO: 212 10*3/MM3 (ref 140–450)
PMV BLD AUTO: 12.8 FL (ref 6–12)
POTASSIUM SERPL-SCNC: 5.2 MMOL/L (ref 3.5–5.2)
RBC # BLD AUTO: 3.33 10*6/MM3 (ref 4.14–5.8)
SODIUM SERPL-SCNC: 137 MMOL/L (ref 136–145)
WBC NRBC COR # BLD: 6.52 10*3/MM3 (ref 3.4–10.8)

## 2023-10-02 PROCEDURE — 63710000001 INSULIN LISPRO (HUMAN) PER 5 UNITS: Performed by: INTERNAL MEDICINE

## 2023-10-02 PROCEDURE — 83735 ASSAY OF MAGNESIUM: CPT | Performed by: INTERNAL MEDICINE

## 2023-10-02 PROCEDURE — 63710000001 INSULIN DETEMIR PER 5 UNITS: Performed by: NURSE PRACTITIONER

## 2023-10-02 PROCEDURE — 82948 REAGENT STRIP/BLOOD GLUCOSE: CPT

## 2023-10-02 PROCEDURE — 25010000002 HYDROMORPHONE 1 MG/ML SOLUTION: Performed by: INTERNAL MEDICINE

## 2023-10-02 PROCEDURE — 97110 THERAPEUTIC EXERCISES: CPT | Performed by: OCCUPATIONAL THERAPIST

## 2023-10-02 PROCEDURE — 25010000002 CEFTRIAXONE PER 250 MG: Performed by: INTERNAL MEDICINE

## 2023-10-02 PROCEDURE — 97116 GAIT TRAINING THERAPY: CPT

## 2023-10-02 PROCEDURE — 80048 BASIC METABOLIC PNL TOTAL CA: CPT | Performed by: INTERNAL MEDICINE

## 2023-10-02 PROCEDURE — 85025 COMPLETE CBC W/AUTO DIFF WBC: CPT | Performed by: INTERNAL MEDICINE

## 2023-10-02 PROCEDURE — 97110 THERAPEUTIC EXERCISES: CPT

## 2023-10-02 PROCEDURE — 85652 RBC SED RATE AUTOMATED: CPT | Performed by: INTERNAL MEDICINE

## 2023-10-02 PROCEDURE — 86140 C-REACTIVE PROTEIN: CPT | Performed by: INTERNAL MEDICINE

## 2023-10-02 RX ORDER — OXYCODONE HCL 10 MG/1
10 TABLET, FILM COATED, EXTENDED RELEASE ORAL EVERY 12 HOURS SCHEDULED
Status: DISPENSED | OUTPATIENT
Start: 2023-10-02 | End: 2023-10-09

## 2023-10-02 RX ORDER — BUMETANIDE 1 MG/1
2 TABLET ORAL
Status: DISCONTINUED | OUTPATIENT
Start: 2023-10-02 | End: 2023-10-09

## 2023-10-02 NOTE — PROGRESS NOTES
Nephrology ([facility]) Progress Note      Patient:  Erick Luong  YOB: 1955  Date of Service: 10/2/2023  MRN: [id number]   Acct: [id number]   Primary Care Physician: Del Shetty MD  Advance Directive:   There are no questions and answers to display.     Admit Date: 9/18/2023       Hospital Day: 0  Referring Provider: Juan Manuel Page MD      Patient personally seen and examined.  Complete chart including Consults, Notes, Operative Reports, Labs, Cardiology, and Radiology studies reviewed as able.        Subjective:  Erick Luong is a 68 y.o. male for whom we were consulted for evaluation and treatment of chronic kidney disease stage IIIb.  He has stage IIIb chronic kidney disease baseline and follows with Dr. Lomas in the office as he has diabetic nephropathy.  He has a history of insulin-dependent type 2 diabetes, hypertension, abdominal obesity, obstructive sleep apnea, diabetic foot ulcer and coronary artery disease.  He was accepted as a transfer from [facility].  [place] [facility] [place] and extensive debridement of left foot wound at plantar aspect.  Postoperative wound measuring 6 x 17 x 4 cm with exposed bone.  Surgical culture returned positive for Proteus Mirabills.  Patient needed long-term IV antibiotics.  He is now admitted for wound care, long-term IV antibiotics, chronic kidney disease and treatment of diastolic congestive heart failure.  Patient also has history of gastroparesis.      Today, no overnight events. Borderline urine output.  No other complaints appreciated. He remains weak.     Temp- 97.5  Pulse- 58  Resp- 18  BP- 132/62      Allergies:  Cefepime, Bactrim [sulfamethoxazole-trimethoprim], Vancomycin, Zolpidem, Zolpidem tartrate, and Metronidazole    Home Meds:  Medications Prior to Admission   Medication Sig Dispense Refill Last Dose    amitriptyline (ELAVIL) 25 MG tablet Take 2 tablets by mouth  Daily at bedtime. 60 tablet 1     amoxicillin-clavulanate (Augmentin) 500-125 MG per tablet Take 1 tablet by mouth every 12 hours with meals for 7 days. 14 tablet 0     ascorbic acid (VITAMIN C) 1000 MG tablet Take 1 tablet by mouth Daily. 30 tablet 3     Aspirin 81 MG capsule Take 81 mg by mouth Daily.       bumetanide (BUMEX) 2 MG tablet Take 1 tablet by mouth Daily. Take 2 tablets in morning;1 tablet at night       busPIRone (BUSPAR) 10 MG tablet Take 1 tablet by mouth 2 (Two) Times a Day As Needed. 100 tablet 3     calcitriol (ROCALTROL) 0.5 MCG capsule Take 1 capsule by mouth Daily. 90 capsule 4     carvedilol (COREG) 6.25 MG tablet Take 1 tablet by mouth 2 (Two) Times a Day. 180 tablet 4     Cholecalciferol (D-5000) 125 MCG (5000 UT) tablet Take 1 tablet by mouth Daily Before Lunch. 30 tablet 2     cloNIDine (CATAPRES) 0.1 MG tablet Take 1 tablet by mouth Every 12 (Twelve) Hours. 60 tablet 2     Diclofenac Sodium (VOLTAREN) 1 % gel gel Apply 2 g topically to the appropriate area as directed 4 (Four) Times a Day As Needed. 300 g 11     donepezil (ARICEPT) 10 MG tablet Take 1 tablet by mouth Daily. 90 tablet 4     Dulaglutide (Trulicity) 4.5 MG/0.5ML solution pen-injector Inject 0.5 mL under the skin into the appropriate area as directed 1 (One) Time Per Week. 2 mL 11     empagliflozin (JARDIANCE) 25 MG tablet tablet Take 1 tablet by mouth Daily. 30 tablet 2     HYDROcodone-acetaminophen (NORCO) 7.5-325 MG per tablet Take 1 tablet by mouth 2 (Two) Times a Day As Needed. 40 tablet 0     Insulin Regular Human, Conc, (HumuLIN R U-500 KwikPen) 500 UNIT/ML solution pen-injector CONCENTRATED injection Inject 120 Units under the skin into the appropriate area as directed 2 (Two) Times a Day with breakfast and dinner. (Patient taking differently: Inject 120 Units under the skin into the appropriate area as directed 3 (Three) Times a Day Before Meals.) 18 mL 5     midodrine (PROAMATINE) 10 MG tablet Take 1 tablet by  mouth 3 (Three) Times a Day. 270 tablet 4     ondansetron ODT (ZOFRAN-ODT) 8 MG disintegrating tablet Place 1 tablet under tongue 3 times a day as needed. 25 tablet 1     pantoprazole (Protonix) 40 MG EC tablet Take 1 tablet by mouth 2 (Two) Times a Day. 180 tablet 4     polyethylene glycol (MIRALAX) 17 g packet Take 17 g by mouth Daily. Obtain OTC       pregabalin (LYRICA) 100 MG capsule Take 1 capsule by mouth Daily With Breakfast AND 2 capsules Every Night. 90 capsule 2     rosuvastatin (CRESTOR) 40 MG tablet Take 1 tablet by mouth Every Night. 90 tablet 3     sennosides-docusate (PERICOLACE) 8.6-50 MG per tablet Take 1 tablet by mouth Every Night. Obtain OTC       sennosides-docusate (PERICOLACE) 8.6-50 MG per tablet Take 1 tablet by mouth every night at bedtime. 30 tablet 2     sodium hypochlorite (DAKIN'S 1/4 STRENGTH) 0.125 % solution topical solution 0.125% Apply to affected area twice daily 473 mL 3     tamsulosin (FLOMAX) 0.4 MG capsule 24 hr capsule Take 1 capsule by mouth Daily. 90 capsule 1        Medicines:  Current Facility-Administered Medications   Medication Dose Route Frequency Provider Last Rate Last Admin    acetaminophen (TYLENOL) tablet 650 mg  650 mg Oral Q4H PRN Juan Manuel Page MD        Or    acetaminophen (TYLENOL) suppository 650 mg  650 mg Rectal Q4H PRN Juan Manuel Page MD        aluminum-magnesium hydroxide-simethicone (MAALOX MAX) 400-400-40 MG/5ML suspension 30 mL  30 mL Oral Q4H PRN Juan Manuel Page MD        apixaban (ELIQUIS) tablet 5 mg  5 mg Oral BID With Meals Juan Manuel Page MD        bumetanide (BUMEX) tablet 1 mg  1 mg Oral Daily Willy Arteaga MD        busPIRone (BUSPAR) tablet 10 mg  10 mg Oral Q12H PRN Juan Manuel Page MD        calcitriol (ROCALTROL) capsule 0.5 mcg  0.5 mcg Oral Daily Juan Manuel Page MD        carvedilol (COREG) tablet 3.125 mg  3.125 mg Oral BID With Meals Juan Manuel Page MD        cefTRIAXone  (ROCEPHIN) 1,000 mg in sodium chloride 0.9 % 100 mL IVPB  1,000 mg Intravenous Q24H Juan Manuel Page MD        cholecalciferol (VITAMIN D3) tablet 5,000 Units  5,000 Units Oral Daily Juan Manuel Page MD        dextrose (D50W) (25 g/50 mL) IV injection 25 g  25 g Intravenous Q15 Min PRN Juan Manuel Page MD        dextrose (GLUTOSE) oral gel 15 g  15 g Oral Q15 Min PRN Juan Manuel Page MD        docusate sodium (COLACE) capsule 200 mg  200 mg Oral BID Juan Manuel Page MD        donepezil (ARICEPT) tablet 10 mg  10 mg Oral Daily Juan Manuel Page MD        Dulaglutide solution pen-injector 4.5 mg  4.5 mg Subcutaneous Weekly Juan Manuel Page MD        glucagon (GLUCAGEN) injection 1 mg  1 mg Intramuscular Q15 Min PRN Juan Manuel Page MD        HYDROmorphone (DILAUDID) injection 1 mg  1 mg Intravenous Q6H PRN Juan Manuel Page MD        [START ON 10/3/2023] insulin detemir (LEVEMIR) injection 30 Units  30 Units Subcutaneous Daily Juan Manuel Page MD        Insulin Lispro (humaLOG) injection 2-9 Units  2-9 Units Subcutaneous 4x Daily AC & at Bedtime Juan Manuel Page MD        isosorbide mononitrate (IMDUR) 24 hr tablet 30 mg  30 mg Oral Q24H Joanie Mendes APRN        Lidocaine HCl 4 % gel gel 1 application   1 application  Topical PRN Juan Manuel Page MD        magnesium hydroxide (MILK OF MAGNESIA) 400 MG/5ML suspension 15 mL  15 mL Oral Daily PRN Juan Manuel Paeg MD        melatonin tablet 6 mg  6 mg Oral Nightly Shruthi Isabel APRN        metoclopramide (REGLAN) tablet 5 mg  5 mg Oral TID AC Juan Manuel Page MD        ondansetron (ZOFRAN) tablet 4 mg  4 mg Oral Q6H PRN Juan Manuel Page MD        Or    ondansetron (ZOFRAN) injection 4 mg  4 mg Intravenous Q6H PRN Juan Manuel Page MD        oxyCODONE (oxyCONTIN) 12 hr tablet 10 mg  10 mg Oral Q12H Juan Manuel Page MD        oxyCODONE (ROXICODONE) immediate release  tablet 5 mg  5 mg Oral Q4H PRN Juan Manuel Page MD        pantoprazole (PROTONIX) EC tablet 40 mg  40 mg Oral BID Juan Manuel Page MD        polyethylene glycol (MIRALAX) packet 17 g  17 g Oral Daily Juan Manuel Page MD        pregabalin (LYRICA) capsule 100 mg  100 mg Oral Daily Juan Manuel Page MD        pregabalin (LYRICA) capsule 200 mg  200 mg Oral Nightly Juan Manuel Page MD        rosuvastatin (CRESTOR) tablet 40 mg  40 mg Oral Nightly Juan Manuel Page MD        saccharomyces boulardii (FLORASTOR) capsule 250 mg  250 mg Oral BID Juan Manuel Page MD        sennosides-docusate (PERICOLACE) 8.6-50 MG per tablet 1 tablet  1 tablet Oral Nightly Juan Manuel Page MD        sodium chloride 0.9 % flush 10 mL  10 mL Intravenous Q12H Juan Manuel Page MD        sodium chloride 0.9 % flush 10 mL  10 mL Intravenous PRN Juan Manuel Page MD        sucralfate (CARAFATE) tablet 1 g  1 g Oral TID AC Juan Manuel Page MD        tamsulosin (FLOMAX) 24 hr capsule 0.4 mg  0.4 mg Oral Daily Juan Manuel Page MD        valsartan (DIOVAN) tablet 40 mg  40 mg Oral Nightly Juan Manuel Page MD           Past Medical History:  Past Medical History:   Diagnosis Date    Arthritis     Autonomic disease     CAD (coronary artery disease) 02/06/2017    Cervical radiculopathy 09/16/2021    Chronic constipation with acute exaccerbation 05/10/2021    Coronary artery disease     Degeneration of cervical intervertebral disc 08/11/2021    Diabetes mellitus     Diabetic foot ulcer 08/31/2020    Diabetic polyneuropathy associated with type 2 diabetes mellitus 01/18/2021    Elevated cholesterol     Gastroesophageal reflux disease 05/13/2019    Headache     HTN (hypertension), benign 05/03/2017    Hyperlipidemia     Hypertension     Mixed hyperlipidemia 02/07/2017    Multiple lung nodules 01/26/2020    5mm, 9 mm RLL identified 1/2020, not present 10/2019.    Myocardial infarction      Osteomyelitis 01/22/2020    Osteomyelitis of fifth toe of right foot 10/07/2019    Pancreatitis     Persistent insomnia 01/20/2020    Renal disorder     Sleep apnea 02/06/2017    Sleep apnea with use of continuous positive airway pressure (CPAP)     NON-COMPLIANT    Slow transit constipation 01/16/2019    Spinal stenosis in cervical region 09/16/2021    Vitamin D deficiency 03/02/2021       Past Surgical History:  Past Surgical History:   Procedure Laterality Date    ABDOMINAL SURGERY      AMPUTATION FOOT / TOE Left 10/2021    5th digit     ANTERIOR CERVICAL DISCECTOMY W/ FUSION N/A 8/5/2022    Procedure: CERVICAL DISCECTOMY ANTERIOR WITH FUSION C5-6 with possible plating of C5-7 with neuromonitoring and 1 c-arm;  Surgeon: Karel Soliz MD;  Location:  [facility] OR;  Service: Neurosurgery;  Laterality: N/A;    APPENDECTOMY      BACK SURGERY      CARDIAC CATHETERIZATION Left 02/08/2021    Procedure: Left Heart Cath w poss intervention left anatomical snuff box acess;  Surgeon: Omkar Charles DO;  Location:  [facility] CATH INVASIVE LOCATION;  Service: Cardiology;  Laterality: Left;    CARDIAC SURGERY      CATARACT EXTRACTION      CERVICAL SPINE SURGERY      COLONOSCOPY N/A 01/31/2017    Normal exam repeat in 5 years    COLONOSCOPY N/A 02/11/2019    Mild acute inflammation    COLONOSCOPY W/ POLYPECTOMY  03/04/2014    Hyperplastic polyp    CORONARY ARTERY BYPASS GRAFT  10/2015    ENDOSCOPY  04/13/2011    Gastritis with hemorrhage    ENDOSCOPY N/A 05/05/2017    Normal exam    ENDOSCOPY N/A 02/11/2019    Gastritis    ENDOSCOPY N/A 09/01/2020    Non-erosive gastritis with hemorrhage    ENDOSCOPY N/A 02/10/2021    Esophagitis    FOOT SURGERY Left     INCISION AND DRAINAGE OF WOUND Left 09/2007    spider bite       Family History  Family History   Problem Relation Age of Onset    Colon cancer Father     Heart disease Father     Colon cancer Sister     Colon polyps Sister     Alzheimer's disease Mother      Coronary artery disease Sister     Coronary artery disease Sister        Social History  Social History     Socioeconomic History    Marital status:    Tobacco Use    Smoking status: Former     Types: Cigarettes     Quit date:      Years since quittin.7    Smokeless tobacco: Never    Tobacco comments:     smoked in highschool   Vaping Use    Vaping Use: Never used   Substance and Sexual Activity    Alcohol use: No    Drug use: No    Sexual activity: Defer       Review of Systems:  History obtained from chart review and the patient  General ROS: No fever or chills  Respiratory ROS: No cough, shortness of breath, wheezing  Cardiovascular ROS: No chest pain or palpitations  Gastrointestinal ROS: No abdominal pain or melena  Genito-Urinary ROS: No dysuria or hematuria  Psych ROS: No anxiety and depression  14 point ROS reviewed with the patient and negative except as noted above and in the HPI unless unable to obtain.    Objective:  Patient Vitals for the past 24 hrs:   Weight   10/01/23 2300 (!) 140 kg (309 lb)       No intake or output data in the 24 hours ending 10/02/23 1638  General: awake/alert   Chest:  clear to auscultation bilaterally without respiratory distress  CVS: regular rate and rhythm  Abdominal: soft, nontender, positive bowel sounds  Extremities: lle edema  Skin: lle wound vac, warm/dry      Labs:  Results from last 7 days   Lab Units 10/02/23  0440 23  0530   WBC 10*3/mm3 6.52 6.49   HEMOGLOBIN g/dL 9.0* 9.2*   HEMATOCRIT % 29.4* 30.8*   PLATELETS 10*3/mm3 212 260           Results from last 7 days   Lab Units 10/02/23  0440 23  0530   SODIUM mmol/L 137 137   POTASSIUM mmol/L 5.2 4.7   CHLORIDE mmol/L 102 103   CO2 mmol/L 25.0 24.0   BUN mg/dL 31* 29*   CREATININE mg/dL 2.12* 2.09*   CALCIUM mg/dL 9.1 9.0   EGFR mL/min/1.73 33.5* 34.1*   GLUCOSE mg/dL 213* 384*         Radiology:   Imaging Results (Last 72 Hours)       ** No results found for the last 72 hours. **             Culture:  No results found for: BLOODCX, URINECX, WOUNDCX, MRSACX, RESPCX, STOOLCX      Assessment   Acute kidney injury/ATN  Chronic kidney disease stage IIIb  Diabetes type 2 with diabetic nephropathy  Diabetic foot ulcer with surgical debridement and wound VAC  Chronic diastolic congestive heart failure  Wound infection with Proteus  Gastroparesis  Secondary hyperparathyroidism  Anemia and chronic kidney disease  Chest Pain    Plan:  Discussed with patient, nursing  Work-up reviewed today  Monitor labs per LTACH schedule  Calcitriol  Continue to monitor renal function has improved 9/25 and 9/28 with adjustments in medication  Increase bumex to 2 mg po bid and follow up labs.     Vinny Hartley MD  10/2/2023  16:38 CDT

## 2023-10-02 NOTE — PROGRESS NOTES
"[facility]  SUSAN Haney        Internal Medicine Progress Note    10/2/2023   10:02 CDT    Name:  Erick Luong  MRN:    [id number]     Acct:     [id number]   Room:  [address] [place] [address] Day: 0     Admit Date: 9/18/2023  6:30 PM  PCP: Del Shetty MD    Subjective:     C/C: weakness, fatigue    Interval History: Status:  improved. Up to chair. No family at bedside. Afebrile. Blood sugars elevated but stable. Renal function stable. Counts otherwise stable. C/o increased pain to foot and \"not feeling good\" today.     Review of Systems   Constitutional: Positive for malaise/fatigue. Negative for chills, decreased appetite, weight gain and weight loss.   HENT:  Negative for congestion, ear discharge, hoarse voice and tinnitus.    Eyes:  Negative for blurred vision, discharge, visual disturbance and visual halos.   Cardiovascular:  Negative for chest pain, claudication, dyspnea on exertion, irregular heartbeat, leg swelling, orthopnea and paroxysmal nocturnal dyspnea.   Respiratory:  Negative for cough, shortness of breath, sputum production and wheezing.    Endocrine: Negative for cold intolerance, heat intolerance and polyuria.   Hematologic/Lymphatic: Negative for adenopathy. Does not bruise/bleed easily.   Skin:  Positive for poor wound healing. Negative for dry skin, itching and suspicious lesions.   Musculoskeletal:  Positive for neck pain. Negative for arthritis, back pain, falls, joint pain, muscle weakness and myalgias.   Gastrointestinal:  Negative for abdominal pain, constipation, diarrhea, dysphagia and hematemesis.   Genitourinary:  Negative for bladder incontinence, dysuria and frequency.   Neurological:  Positive for weakness. Negative for aphonia, disturbances in coordination and dizziness.   Psychiatric/Behavioral:  Negative for altered mental status, depression, memory loss and substance abuse. The patient does not have insomnia and is not nervous/anxious.  " "      Medications:     Allergies:   Allergies   Allergen Reactions    Cefepime Hives and Anaphylaxis    Bactrim [Sulfamethoxazole-Trimethoprim] Other (See Comments)     \"RENAL FAILURE\"    Vancomycin Itching    Zolpidem Unknown - High Severity     \"makes him crazy\"    Zolpidem Tartrate Unknown - Low Severity and Provider Review Needed    Metronidazole Rash       Current Meds:   Current Facility-Administered Medications:     acetaminophen (TYLENOL) tablet 650 mg, 650 mg, Oral, Q4H PRN **OR** acetaminophen (TYLENOL) suppository 650 mg, 650 mg, Rectal, Q4H PRN, Juan Manuel Page MD    aluminum-magnesium hydroxide-simethicone (MAALOX MAX) 400-400-40 MG/5ML suspension 30 mL, 30 mL, Oral, Q4H PRN, Juan Manuel Page MD    apixaban (ELIQUIS) tablet 5 mg, 5 mg, Oral, BID With Meals, Juan Manuel Page MD    bumetanide (BUMEX) tablet 1 mg, 1 mg, Oral, Daily, Willy Arteaga MD    busPIRone (BUSPAR) tablet 10 mg, 10 mg, Oral, Q12H PRN, Juan Manuel Page MD    calcitriol (ROCALTROL) capsule 0.5 mcg, 0.5 mcg, Oral, Daily, Juan Manuel Page MD    carvedilol (COREG) tablet 3.125 mg, 3.125 mg, Oral, BID With Meals, Juan Manuel Page MD    cefTRIAXone (ROCEPHIN) 1,000 mg in sodium chloride 0.9 % 100 mL IVPB, 1,000 mg, Intravenous, Q24H, Juan Manuel Page MD    cholecalciferol (VITAMIN D3) tablet 5,000 Units, 5,000 Units, Oral, Daily, Juan Manuel Page MD    dextrose (D50W) (25 g/50 mL) IV injection 25 g, 25 g, Intravenous, Q15 Min PRN, Juan Manuel Page MD    dextrose (GLUTOSE) oral gel 15 g, 15 g, Oral, Q15 Min PRN, Juan Manuel Page MD    docusate sodium (COLACE) capsule 200 mg, 200 mg, Oral, BID, Juan Manuel Page MD    donepezil (ARICEPT) tablet 10 mg, 10 mg, Oral, Daily, Juan Manuel Page MD    Dulaglutide solution pen-injector 4.5 mg, 4.5 mg, Subcutaneous, Weekly, Juan Manuel Page MD    glucagon (GLUCAGEN) injection 1 mg, 1 mg, Intramuscular, Q15 Min " PRN, Juan Manuel Page MD    HYDROmorphone (DILAUDID) injection 1 mg, 1 mg, Intravenous, Q6H PRN, Juan Manuel Page MD    insulin detemir (LEVEMIR) injection 25 Units, 25 Units, Subcutaneous, Daily, Shruthi Isabel, SUSAN    Insulin Lispro (humaLOG) injection 2-9 Units, 2-9 Units, Subcutaneous, 4x Daily AC & at Bedtime, Juan Manuel Page MD    isosorbide mononitrate (IMDUR) 24 hr tablet 30 mg, 30 mg, Oral, Q24H, Joanie Mendes APRN    Lidocaine HCl 4 % gel gel 1 application , 1 application , Topical, PRN, Juan Manuel Page MD    magnesium hydroxide (MILK OF MAGNESIA) 400 MG/5ML suspension 15 mL, 15 mL, Oral, Daily PRN, Juan Manuel Page MD    melatonin tablet 6 mg, 6 mg, Oral, Nightly, Shruthi Isabel, APRSHERRILL    metoclopramide (REGLAN) tablet 5 mg, 5 mg, Oral, TID AC, Juan Manuel Page MD    ondansetron (ZOFRAN) tablet 4 mg, 4 mg, Oral, Q6H PRN **OR** ondansetron (ZOFRAN) injection 4 mg, 4 mg, Intravenous, Q6H PRN, Juan Manuel Page MD    oxyCODONE (oxyCONTIN) 12 hr tablet 10 mg, 10 mg, Oral, Q12H, Juan Manuel Page MD    oxyCODONE (ROXICODONE) immediate release tablet 5 mg, 5 mg, Oral, Q4H PRN, Juan Manuel Page MD    pantoprazole (PROTONIX) EC tablet 40 mg, 40 mg, Oral, BID, Juan Manuel Page MD    polyethylene glycol (MIRALAX) packet 17 g, 17 g, Oral, Daily, Juan Manuel Page MD    pregabalin (LYRICA) capsule 100 mg, 100 mg, Oral, Daily, Juan Manuel Page MD    pregabalin (LYRICA) capsule 200 mg, 200 mg, Oral, Nightly, Juan Manuel Page MD    rosuvastatin (CRESTOR) tablet 40 mg, 40 mg, Oral, Nightly, Juan Manuel Page MD    saccharomyces boulardii (FLORASTOR) capsule 250 mg, 250 mg, Oral, BID, Juan Manuel Page MD    sennosides-docusate (PERICOLACE) 8.6-50 MG per tablet 1 tablet, 1 tablet, Oral, Nightly, Juan Manuel Page MD    sodium chloride 0.9 % flush 10 mL, 10 mL, Intravenous, Q12H, Juan Manuel Page MD    sodium  chloride 0.9 % flush 10 mL, 10 mL, Intravenous, PRN, Juan Manuel Page MD    sucralfate (CARAFATE) tablet 1 g, 1 g, Oral, TID AC, Juan Manuel Page MD    tamsulosin (FLOMAX) 24 hr capsule 0.4 mg, 0.4 mg, Oral, Daily, Juan Manuel Page MD    valsartan (DIOVAN) tablet 40 mg, 40 mg, Oral, Nightly, Juan Manuel Page MD    Data:     Code Status:    There are no questions and answers to display.       Family History   Problem Relation Age of Onset    Colon cancer Father     Heart disease Father     Colon cancer Sister     Colon polyps Sister     Alzheimer's disease Mother     Coronary artery disease Sister     Coronary artery disease Sister        Social History     Socioeconomic History    Marital status:    Tobacco Use    Smoking status: Former     Types: Cigarettes     Quit date:      Years since quittin.7    Smokeless tobacco: Never    Tobacco comments:     smoked in [place]   Vaping Use    Vaping Use: Never used   Substance and Sexual Activity    Alcohol use: No    Drug use: No    Sexual activity: Defer       Vitals:  T 97.7 P 61 R 20 Bp 125/53 Sp02 97% (room air)            I/O (24Hr):  No intake or output data in the 24 hours ending 10/02/23 1002    Labs and imaging:      Recent Results (from the past 12 hour(s))   Basic Metabolic Panel    Collection Time: 10/02/23  4:40 AM    Specimen: Blood   Result Value Ref Range    Glucose 213 (H) 65 - 99 mg/dL    BUN 31 (H) 8 - 23 mg/dL    Creatinine 2.12 (H) 0.76 - 1.27 mg/dL    Sodium 137 136 - 145 mmol/L    Potassium 5.2 3.5 - 5.2 mmol/L    Chloride 102 98 - 107 mmol/L    CO2 25.0 22.0 - 29.0 mmol/L    Calcium 9.1 8.6 - 10.5 mg/dL    BUN/Creatinine Ratio 14.6 7.0 - 25.0    Anion Gap 10.0 5.0 - 15.0 mmol/L    eGFR 33.5 (L) >60.0 mL/min/1.73   C-reactive Protein    Collection Time: 10/02/23  4:40 AM    Specimen: Blood   Result Value Ref Range    C-Reactive Protein 1.69 (H) 0.00 - 0.50 mg/dL   Magnesium    Collection Time: 10/02/23  4:40  AM    Specimen: Blood   Result Value Ref Range    Magnesium 2.5 (H) 1.6 - 2.4 mg/dL   CBC Auto Differential    Collection Time: 10/02/23  4:40 AM    Specimen: Blood   Result Value Ref Range    WBC 6.52 3.40 - 10.80 10*3/mm3    RBC 3.33 (L) 4.14 - 5.80 10*6/mm3    Hemoglobin 9.0 (L) 13.0 - 17.7 g/dL    Hematocrit 29.4 (L) 37.5 - 51.0 %    MCV 88.3 79.0 - 97.0 fL    MCH 27.0 26.6 - 33.0 pg    MCHC 30.6 (L) 31.5 - 35.7 g/dL    RDW 15.9 (H) 12.3 - 15.4 %    RDW-SD 51.7 37.0 - 54.0 fl    MPV 12.8 (H) 6.0 - 12.0 fL    Platelets 212 140 - 450 10*3/mm3    Neutrophil % 50.5 42.7 - 76.0 %    Lymphocyte % 25.0 19.6 - 45.3 %    Monocyte % 9.8 5.0 - 12.0 %    Eosinophil % 13.3 (H) 0.3 - 6.2 %    Basophil % 1.1 0.0 - 1.5 %    Immature Grans % 0.3 0.0 - 0.5 %    Neutrophils, Absolute 3.29 1.70 - 7.00 10*3/mm3    Lymphocytes, Absolute 1.63 0.70 - 3.10 10*3/mm3    Monocytes, Absolute 0.64 0.10 - 0.90 10*3/mm3    Eosinophils, Absolute 0.87 (H) 0.00 - 0.40 10*3/mm3    Basophils, Absolute 0.07 0.00 - 0.20 10*3/mm3    Immature Grans, Absolute 0.02 0.00 - 0.05 10*3/mm3    nRBC 0.0 0.0 - 0.2 /100 WBC   Sedimentation Rate    Collection Time: 10/02/23  5:56 AM    Specimen: Blood   Result Value Ref Range    Sed Rate 55 (H) 0 - 20 mm/hr   POC Glucose Once    Collection Time: 10/02/23  7:29 AM    Specimen: Blood   Result Value Ref Range    Glucose 192 (H) 70 - 130 mg/dL                               Physical Examination:        Physical Exam  Vitals and nursing note reviewed.   Constitutional:       Appearance: Normal appearance. He is well-developed. He is obese.   HENT:      Head: Normocephalic and atraumatic.      Right Ear: External ear normal.      Left Ear: External ear normal.      Nose: Nose normal.      Mouth/Throat:      Mouth: Mucous membranes are moist.      Pharynx: Oropharynx is clear.   Eyes:      Pupils: Pupils are equal, round, and reactive to light.   Cardiovascular:      Rate and Rhythm: Normal rate. Rhythm irregular.       Heart sounds: Normal heart sounds.   Pulmonary:      Effort: Pulmonary effort is normal.      Breath sounds: Normal breath sounds.   Abdominal:      General: Bowel sounds are normal.      Palpations: Abdomen is soft.      Comments: obese   Musculoskeletal:         General: Normal range of motion.      Cervical back: Normal range of motion and neck supple.      Comments: Generalized weakness   Skin:     General: Skin is warm and dry.      Comments: Wound vac intact   Neurological:      Mental Status: He is alert and oriented to person, place, and time.      Deep Tendon Reflexes: Reflexes are normal and symmetric.   Psychiatric:         Behavior: Behavior normal.         Assessment:            * No active hospital problems. *    Past Medical History:   Diagnosis Date    Arthritis     Autonomic disease     CAD (coronary artery disease) 02/06/2017    Cervical radiculopathy 09/16/2021    Chronic constipation with acute exaccerbation 05/10/2021    Coronary artery disease     Degeneration of cervical intervertebral disc 08/11/2021    Diabetes mellitus     Diabetic foot ulcer 08/31/2020    Diabetic polyneuropathy associated with type 2 diabetes mellitus 01/18/2021    Elevated cholesterol     Gastroesophageal reflux disease 05/13/2019    Headache     HTN (hypertension), benign 05/03/2017    Hyperlipidemia     Hypertension     Mixed hyperlipidemia 02/07/2017    Multiple lung nodules 01/26/2020    5mm, 9 mm RLL identified 1/2020, not present 10/2019.    Myocardial infarction     Osteomyelitis 01/22/2020    Osteomyelitis of fifth toe of right foot 10/07/2019    Pancreatitis     Persistent insomnia 01/20/2020    Renal disorder     Sleep apnea 02/06/2017    Sleep apnea with use of continuous positive airway pressure (CPAP)     NON-COMPLIANT    Slow transit constipation 01/16/2019    Spinal stenosis in cervical region 09/16/2021    Vitamin D deficiency 03/02/2021        Plan:        Proteus wound infection s/p debridement  New  onset atrial fibrillation  Acute on chronic diastolic CHF  CKD3  Multifactorial anemia  DM2 with hyperglycemia on long term insulin  BRITTNI  Chronic pain syndrome  Chronic neck pain with radiculopathy  Diabetic peripheral neuropathy  Hx CAD  GERD  Possible gastroparesis  Sepsis secondary to wound infection  Continue current treatment. Monitor counts. Increase activity. Labs Thursday. Wound care per wound care team. Continue IV antibiotics. Appreciate nephrology evaluation and treatment. Continue glycemic control efforts. Maintain patient safety. Continue pain control efforts. Appreciate cardiology evaluation and recommendations.       Electronically signed by SUSAN Muñoz on 10/2/2023 at 10:02 CDT   I have discussed the care of Erick Luong, including pertinent history and exam findings, with the nurse practitioner.    I have seen and examined the patient and the key elements of all parts of the encounter have been performed by me.  I agree with the assessment, plan and orders as documented by SUSAN Haney, after I modified the exam findings and the plan of treatments and the final version is my approved version of the assessment.        Electronically signed by Juan Manuel Page MD on 10/2/2023 at 12:10 CDT

## 2023-10-02 NOTE — ACP (ADVANCE CARE PLANNING)
Date of [facility] ACP conversation: 9/27/23  Location of conversation: Patient's Room.  ACP facilitator: Juan Manuel Quarles.  Referral source: Patient.  Time spent in conversation and documentation: drop down [id number]    DOCUMENTS COMPLETED:    Living Will Directive: YES  CHOOSING A HEALTHCARE SURROGATE/DECISION MAKER:   Reviewed the qualities of healthcare surrogate: YES   Name of healthcare surrogate, if chosen: Joan Luong.    Relationship to patient: Wife.   Has not chosen a healthcare surrogate at this time: YES    SUMMARY OF CONVERSATION:   Discussed the Living Will Directive with Mr. Luong with his wife present.  After explaining some key points of the document, he confirmed that he understood everything.  I notarized it and gave him some copies and the original.  The directive was faxed to HIM in our hospital.  [facility] also placed the directive into his chart.

## 2023-10-03 LAB
GLUCOSE BLDC GLUCOMTR-MCNC: 111 MG/DL (ref 70–130)
GLUCOSE BLDC GLUCOMTR-MCNC: 226 MG/DL (ref 70–130)
GLUCOSE BLDC GLUCOMTR-MCNC: 270 MG/DL (ref 70–130)
GLUCOSE BLDC GLUCOMTR-MCNC: 305 MG/DL (ref 70–130)

## 2023-10-03 PROCEDURE — 82948 REAGENT STRIP/BLOOD GLUCOSE: CPT

## 2023-10-03 PROCEDURE — 25010000002 HYDROMORPHONE 1 MG/ML SOLUTION: Performed by: INTERNAL MEDICINE

## 2023-10-03 PROCEDURE — 92507 TX SP LANG VOICE COMM INDIV: CPT

## 2023-10-03 PROCEDURE — 63710000001 INSULIN DETEMIR PER 5 UNITS: Performed by: INTERNAL MEDICINE

## 2023-10-03 PROCEDURE — 97110 THERAPEUTIC EXERCISES: CPT

## 2023-10-03 PROCEDURE — 25010000002 CEFTRIAXONE PER 250 MG: Performed by: INTERNAL MEDICINE

## 2023-10-03 PROCEDURE — 63710000001 INSULIN LISPRO (HUMAN) PER 5 UNITS: Performed by: INTERNAL MEDICINE

## 2023-10-03 NOTE — PROGRESS NOTES
[facility]  NIMESH Parkinson APRN        Internal Medicine Progress Note    10/3/2023   11:10 CDT    Name:  Erick Luong  MRN:    [id number]     Acct:     [id number]   Room:  [address] [place] [address] Day: 0     Admit Date: 9/18/2023  6:30 PM  PCP: Del Shetty MD    Subjective:     C/C: weakness, fatigue    Interval History: Status:  improved. Up to chair. No family at bedside. Woke from sleep. Afebrile. Blood sugars remain elevated but stable. Tolerated wound vac change. Discharge planning underway.     Review of Systems   Constitutional: Positive for malaise/fatigue. Negative for chills, decreased appetite, weight gain and weight loss.   HENT:  Negative for congestion, ear discharge, hoarse voice and tinnitus.    Eyes:  Negative for blurred vision, discharge, visual disturbance and visual halos.   Cardiovascular:  Negative for chest pain, claudication, dyspnea on exertion, irregular heartbeat, leg swelling, orthopnea and paroxysmal nocturnal dyspnea.   Respiratory:  Negative for cough, shortness of breath, sputum production and wheezing.    Endocrine: Negative for cold intolerance, heat intolerance and polyuria.   Hematologic/Lymphatic: Negative for adenopathy. Does not bruise/bleed easily.   Skin:  Positive for poor wound healing. Negative for dry skin, itching and suspicious lesions.   Musculoskeletal:  Positive for neck pain. Negative for arthritis, back pain, falls, joint pain, muscle weakness and myalgias.   Gastrointestinal:  Negative for abdominal pain, constipation, diarrhea, dysphagia and hematemesis.   Genitourinary:  Negative for bladder incontinence, dysuria and frequency.   Neurological:  Positive for weakness. Negative for aphonia, disturbances in coordination and dizziness.   Psychiatric/Behavioral:  Negative for altered mental status, depression, memory loss and substance abuse. The patient does not have insomnia and is not nervous/anxious.        Medications:     Allergies:  "  Allergies   Allergen Reactions    Cefepime Hives and Anaphylaxis    Bactrim [Sulfamethoxazole-Trimethoprim] Other (See Comments)     \"RENAL FAILURE\"    Vancomycin Itching    Zolpidem Unknown - High Severity     \"makes him crazy\"    Zolpidem Tartrate Unknown - Low Severity and Provider Review Needed    Metronidazole Rash       Current Meds:   Current Facility-Administered Medications:     acetaminophen (TYLENOL) tablet 650 mg, 650 mg, Oral, Q4H PRN **OR** acetaminophen (TYLENOL) suppository 650 mg, 650 mg, Rectal, Q4H PRN, Juan Manuel Page MD    aluminum-magnesium hydroxide-simethicone (MAALOX MAX) 400-400-40 MG/5ML suspension 30 mL, 30 mL, Oral, Q4H PRN, Juan Manuel Page MD    apixaban (ELIQUIS) tablet 5 mg, 5 mg, Oral, BID With Meals, Juan Manuel Page MD    bumetanide (BUMEX) tablet 2 mg, 2 mg, Oral, BID, Willy Arteaga MD    busPIRone (BUSPAR) tablet 10 mg, 10 mg, Oral, Q12H PRN, Juan Manuel Page MD    calcitriol (ROCALTROL) capsule 0.5 mcg, 0.5 mcg, Oral, Daily, Juan Manuel Page MD    carvedilol (COREG) tablet 3.125 mg, 3.125 mg, Oral, BID With Meals, Juan Manuel Page MD    cefTRIAXone (ROCEPHIN) 1,000 mg in sodium chloride 0.9 % 100 mL IVPB, 1,000 mg, Intravenous, Q24H, Juan Manuel Page MD    cholecalciferol (VITAMIN D3) tablet 5,000 Units, 5,000 Units, Oral, Daily, Juan Manuel Page MD    dextrose (D50W) (25 g/50 mL) IV injection 25 g, 25 g, Intravenous, Q15 Min PRN, Juan Manuel Page MD    dextrose (GLUTOSE) oral gel 15 g, 15 g, Oral, Q15 Min PRN, Juan Manuel Page MD    docusate sodium (COLACE) capsule 200 mg, 200 mg, Oral, BID, Juan Manuel Page MD    donepezil (ARICEPT) tablet 10 mg, 10 mg, Oral, Daily, Juan Manuel Page MD    Dulaglutide solution pen-injector 4.5 mg, 4.5 mg, Subcutaneous, Weekly, Juan Manuel Page MD    glucagon (GLUCAGEN) injection 1 mg, 1 mg, Intramuscular, Q15 Min PRN, Juan Manuel Page MD    " HYDROmorphone (DILAUDID) injection 1 mg, 1 mg, Intravenous, Q6H PRN, Juan Manuel Page MD    insulin detemir (LEVEMIR) injection 30 Units, 30 Units, Subcutaneous, Daily, Juan Manuel Page MD    Insulin Lispro (humaLOG) injection 2-9 Units, 2-9 Units, Subcutaneous, 4x Daily AC & at Bedtime, Juan Manuel Page MD    isosorbide mononitrate (IMDUR) 24 hr tablet 30 mg, 30 mg, Oral, Q24H, Joanie Mendes APRN    Lidocaine HCl 4 % gel gel 1 application , 1 application , Topical, PRN, Juan Manuel Page MD    magnesium hydroxide (MILK OF MAGNESIA) 400 MG/5ML suspension 15 mL, 15 mL, Oral, Daily PRN, Juan Manuel Page MD    melatonin tablet 6 mg, 6 mg, Oral, Nightly, Shruthi Isabel, SUSAN    metoclopramide (REGLAN) tablet 5 mg, 5 mg, Oral, TID AC, Juan Manuel Page MD    ondansetron (ZOFRAN) tablet 4 mg, 4 mg, Oral, Q6H PRN **OR** ondansetron (ZOFRAN) injection 4 mg, 4 mg, Intravenous, Q6H PRN, Juan Manuel Page MD    oxyCODONE (oxyCONTIN) 12 hr tablet 10 mg, 10 mg, Oral, Q12H, Juan Manuel Page MD    oxyCODONE (ROXICODONE) immediate release tablet 5 mg, 5 mg, Oral, Q4H PRN, Juan Manuel Page MD    pantoprazole (PROTONIX) EC tablet 40 mg, 40 mg, Oral, BID, Juan Manuel Page MD    polyethylene glycol (MIRALAX) packet 17 g, 17 g, Oral, Daily, Juan Manuel Page MD    pregabalin (LYRICA) capsule 100 mg, 100 mg, Oral, Daily, Juan Manuel Page MD    pregabalin (LYRICA) capsule 200 mg, 200 mg, Oral, Nightly, Juan Manuel Page MD    rosuvastatin (CRESTOR) tablet 40 mg, 40 mg, Oral, Nightly, Juan Manuel Page MD    saccharomyces boulardii (FLORASTOR) capsule 250 mg, 250 mg, Oral, BID, Juan Manuel Page MD    sennosides-docusate (PERICOLACE) 8.6-50 MG per tablet 1 tablet, 1 tablet, Oral, Nightly, Juan Manuel Page MD    sodium chloride 0.9 % flush 10 mL, 10 mL, Intravenous, Q12H, Juan Manuel Page MD    sodium chloride 0.9 % flush 10 mL, 10 mL,  Intravenous, PRN, Juan Manuel Page MD    sucralfate (CARAFATE) tablet 1 g, 1 g, Oral, TID AC, Juan Manuel Page MD    tamsulosin (FLOMAX) 24 hr capsule 0.4 mg, 0.4 mg, Oral, Daily, Juan Manuel Page MD    valsartan (DIOVAN) tablet 40 mg, 40 mg, Oral, Nightly, Juan Manuel Page MD    Data:     Code Status:    There are no questions and answers to display.       Family History   Problem Relation Age of Onset    Colon cancer Father     Heart disease Father     Colon cancer Sister     Colon polyps Sister     Alzheimer's disease Mother     Coronary artery disease Sister     Coronary artery disease Sister        Social History     Socioeconomic History    Marital status:    Tobacco Use    Smoking status: Former     Types: Cigarettes     Quit date:      Years since quittin.7    Smokeless tobacco: Never    Tobacco comments:     smoked in [place]   Vaping Use    Vaping Use: Never used   Substance and Sexual Activity    Alcohol use: No    Drug use: No    Sexual activity: Defer       Vitals:  T 98.6 P 74 R 18 Bp 118/61 Sp02 95% (room air)            I/O (24Hr):  No intake or output data in the 24 hours ending 10/03/23 1110    Labs and imaging:      Recent Results (from the past 12 hour(s))   POC Glucose Once    Collection Time: 10/03/23  7:40 AM    Specimen: Blood   Result Value Ref Range    Glucose 111 70 - 130 mg/dL                               Physical Examination:        Physical Exam  Vitals and nursing note reviewed.   Constitutional:       Appearance: Normal appearance. He is well-developed. He is obese.   HENT:      Head: Normocephalic and atraumatic.      Right Ear: External ear normal.      Left Ear: External ear normal.      Nose: Nose normal.      Mouth/Throat:      Mouth: Mucous membranes are moist.      Pharynx: Oropharynx is clear.   Eyes:      Pupils: Pupils are equal, round, and reactive to light.   Cardiovascular:      Rate and Rhythm: Normal rate. Rhythm irregular.       Heart sounds: Normal heart sounds.   Pulmonary:      Effort: Pulmonary effort is normal.      Breath sounds: Normal breath sounds.   Abdominal:      General: Bowel sounds are normal.      Palpations: Abdomen is soft.      Comments: obese   Musculoskeletal:         General: Normal range of motion.      Cervical back: Normal range of motion and neck supple.      Comments: Generalized weakness   Skin:     General: Skin is warm and dry.      Comments: Wound vac intact   Neurological:      Mental Status: He is alert and oriented to person, place, and time.      Deep Tendon Reflexes: Reflexes are normal and symmetric.   Psychiatric:         Behavior: Behavior normal.         Assessment:            * No active hospital problems. *    Past Medical History:   Diagnosis Date    Arthritis     Autonomic disease     CAD (coronary artery disease) 02/06/2017    Cervical radiculopathy 09/16/2021    Chronic constipation with acute exaccerbation 05/10/2021    Coronary artery disease     Degeneration of cervical intervertebral disc 08/11/2021    Diabetes mellitus     Diabetic foot ulcer 08/31/2020    Diabetic polyneuropathy associated with type 2 diabetes mellitus 01/18/2021    Elevated cholesterol     Gastroesophageal reflux disease 05/13/2019    Headache     HTN (hypertension), benign 05/03/2017    Hyperlipidemia     Hypertension     Mixed hyperlipidemia 02/07/2017    Multiple lung nodules 01/26/2020    5mm, 9 mm RLL identified 1/2020, not present 10/2019.    Myocardial infarction     Osteomyelitis 01/22/2020    Osteomyelitis of fifth toe of right foot 10/07/2019    Pancreatitis     Persistent insomnia 01/20/2020    Renal disorder     Sleep apnea 02/06/2017    Sleep apnea with use of continuous positive airway pressure (CPAP)     NON-COMPLIANT    Slow transit constipation 01/16/2019    Spinal stenosis in cervical region 09/16/2021    Vitamin D deficiency 03/02/2021        Plan:        Proteus wound infection s/p debridement  New  onset atrial fibrillation  Acute on chronic diastolic CHF  CKD3  Multifactorial anemia  DM2 with hyperglycemia on long term insulin  BRITTNI  Chronic pain syndrome  Chronic neck pain with radiculopathy  Diabetic peripheral neuropathy  Hx CAD  GERD  Possible gastroparesis  Sepsis secondary to wound infection  Continue current treatment. Monitor counts. Increase activity. Labs Thursday. Wound care per wound care team. Continue IV antibiotics. Appreciate nephrology evaluation and treatment. Continue glycemic control efforts. Maintain patient safety. Continue pain control efforts. Appreciate cardiology evaluation and recommendations. Discharge planning.       Electronically signed by SUSAN Muñoz on 10/3/2023 at 11:10 CDT

## 2023-10-03 NOTE — PROGRESS NOTES
Nephrology ([facility]) Progress Note      Patient:  Erick Luong  YOB: 1955  Date of Service: 10/3/2023  MRN: [id number]   Acct: [id number]   Primary Care Physician: Del Shetty MD  Advance Directive:   There are no questions and answers to display.     Admit Date: 9/18/2023       Hospital Day: 0  Referring Provider: Juan Manuel Page MD      Patient personally seen and examined.  Complete chart including Consults, Notes, Operative Reports, Labs, Cardiology, and Radiology studies reviewed as able.        Subjective:  Erick Luong is a 68 y.o. male for whom we were consulted for evaluation and treatment of chronic kidney disease stage IIIb.  He has stage IIIb chronic kidney disease baseline and follows with Dr. Lomas in the office as he has diabetic nephropathy.  He has a history of insulin-dependent type 2 diabetes, hypertension, abdominal obesity, obstructive sleep apnea, diabetic foot ulcer and coronary artery disease.  He was accepted as a transfer from [facility].  Patient was admitted at [facility] by Dr. Gomes and extensive debridement of left foot wound at plantar aspect.  Postoperative wound measuring 6 x 17 x 4 cm with exposed bone.  Surgical culture returned positive for Proteus Mirabills.  Patient needed long-term IV antibiotics.  He is now admitted for wound care, long-term IV antibiotics, chronic kidney disease and treatment of diastolic congestive heart failure.  Patient also has history of gastroparesis.      We increased his Bumex dose and his urine output has subsequently improved.  Today he noticed less swelling in his lower extremities and has been less dyspneic.    Temp- 98.6  Pulse- 74  Resp- 18  BP- 118/61      Allergies:  Cefepime, Bactrim [sulfamethoxazole-trimethoprim], Vancomycin, Zolpidem, Zolpidem tartrate, and Metronidazole    Home Meds:  Medications Prior to Admission   Medication Sig Dispense Refill Last Dose     amitriptyline (ELAVIL) 25 MG tablet Take 2 tablets by mouth Daily at bedtime. 60 tablet 1     amoxicillin-clavulanate (Augmentin) 500-125 MG per tablet Take 1 tablet by mouth every 12 hours with meals for 7 days. 14 tablet 0     ascorbic acid (VITAMIN C) 1000 MG tablet Take 1 tablet by mouth Daily. 30 tablet 3     Aspirin 81 MG capsule Take 81 mg by mouth Daily.       bumetanide (BUMEX) 2 MG tablet Take 1 tablet by mouth Daily. Take 2 tablets in morning;1 tablet at night       busPIRone (BUSPAR) 10 MG tablet Take 1 tablet by mouth 2 (Two) Times a Day As Needed. 100 tablet 3     calcitriol (ROCALTROL) 0.5 MCG capsule Take 1 capsule by mouth Daily. 90 capsule 4     carvedilol (COREG) 6.25 MG tablet Take 1 tablet by mouth 2 (Two) Times a Day. 180 tablet 4     Cholecalciferol (D-5000) 125 MCG (5000 UT) tablet Take 1 tablet by mouth Daily Before Lunch. 30 tablet 2     cloNIDine (CATAPRES) 0.1 MG tablet Take 1 tablet by mouth Every 12 (Twelve) Hours. 60 tablet 2     Diclofenac Sodium (VOLTAREN) 1 % gel gel Apply 2 g topically to the appropriate area as directed 4 (Four) Times a Day As Needed. 300 g 11     donepezil (ARICEPT) 10 MG tablet Take 1 tablet by mouth Daily. 90 tablet 4     Dulaglutide (Trulicity) 4.5 MG/0.5ML solution pen-injector Inject 0.5 mL under the skin into the appropriate area as directed 1 (One) Time Per Week. 2 mL 11     empagliflozin (JARDIANCE) 25 MG tablet tablet Take 1 tablet by mouth Daily. 30 tablet 2     HYDROcodone-acetaminophen (NORCO) 7.5-325 MG per tablet Take 1 tablet by mouth 2 (Two) Times a Day As Needed. 40 tablet 0     Insulin Regular Human, Conc, (HumuLIN R U-500 KwikPen) 500 UNIT/ML solution pen-injector CONCENTRATED injection Inject 120 Units under the skin into the appropriate area as directed 2 (Two) Times a Day with breakfast and dinner. (Patient taking differently: Inject 120 Units under the skin into the appropriate area as directed 3 (Three) Times a Day Before Meals.) 18 mL 5      midodrine (PROAMATINE) 10 MG tablet Take 1 tablet by mouth 3 (Three) Times a Day. 270 tablet 4     ondansetron ODT (ZOFRAN-ODT) 8 MG disintegrating tablet Place 1 tablet under tongue 3 times a day as needed. 25 tablet 1     pantoprazole (Protonix) 40 MG EC tablet Take 1 tablet by mouth 2 (Two) Times a Day. 180 tablet 4     polyethylene glycol (MIRALAX) 17 g packet Take 17 g by mouth Daily. Obtain OTC       pregabalin (LYRICA) 100 MG capsule Take 1 capsule by mouth Daily With Breakfast AND 2 capsules Every Night. 90 capsule 2     rosuvastatin (CRESTOR) 40 MG tablet Take 1 tablet by mouth Every Night. 90 tablet 3     sennosides-docusate (PERICOLACE) 8.6-50 MG per tablet Take 1 tablet by mouth Every Night. Obtain OTC       sennosides-docusate (PERICOLACE) 8.6-50 MG per tablet Take 1 tablet by mouth every night at bedtime. 30 tablet 2     sodium hypochlorite (DAKIN'S 1/4 STRENGTH) 0.125 % solution topical solution 0.125% Apply to affected area twice daily 473 mL 3     tamsulosin (FLOMAX) 0.4 MG capsule 24 hr capsule Take 1 capsule by mouth Daily. 90 capsule 1        Medicines:  Current Facility-Administered Medications   Medication Dose Route Frequency Provider Last Rate Last Admin    acetaminophen (TYLENOL) tablet 650 mg  650 mg Oral Q4H PRN Juan Manuel Page MD        Or    acetaminophen (TYLENOL) suppository 650 mg  650 mg Rectal Q4H PRN Juan Manuel Page MD        aluminum-magnesium hydroxide-simethicone (MAALOX MAX) 400-400-40 MG/5ML suspension 30 mL  30 mL Oral Q4H PRN Juan Manuel Page MD        apixaban (ELIQUIS) tablet 5 mg  5 mg Oral BID With Meals Juan Manuel Page MD        bumetanide (BUMEX) tablet 2 mg  2 mg Oral BID Willy Arteaga MD        busPIRone (BUSPAR) tablet 10 mg  10 mg Oral Q12H PRN Juan Manuel Page MD        calcitriol (ROCALTROL) capsule 0.5 mcg  0.5 mcg Oral Daily Juan Manuel Page MD        carvedilol (COREG) tablet 3.125 mg  3.125 mg Oral BID  With Meals Juan Manuel Page MD        cefTRIAXone (ROCEPHIN) 1,000 mg in sodium chloride 0.9 % 100 mL IVPB  1,000 mg Intravenous Q24H Juan Manuel Page MD        cholecalciferol (VITAMIN D3) tablet 5,000 Units  5,000 Units Oral Daily Juan Manuel Page MD        dextrose (D50W) (25 g/50 mL) IV injection 25 g  25 g Intravenous Q15 Min PRN Juan Manuel Page MD        dextrose (GLUTOSE) oral gel 15 g  15 g Oral Q15 Min PRN Juan Manuel Page MD        docusate sodium (COLACE) capsule 200 mg  200 mg Oral BID Juan Manuel Page MD        donepezil (ARICEPT) tablet 10 mg  10 mg Oral Daily Juan Manuel Page MD        Dulaglutide solution pen-injector 4.5 mg  4.5 mg Subcutaneous Weekly Juan Manuel Page MD        glucagon (GLUCAGEN) injection 1 mg  1 mg Intramuscular Q15 Min PRN Juan Manuel Page MD        HYDROmorphone (DILAUDID) injection 1 mg  1 mg Intravenous Q6H PRN Juan Manuel Page MD        insulin detemir (LEVEMIR) injection 30 Units  30 Units Subcutaneous Daily Juan Manuel Page MD        Insulin Lispro (humaLOG) injection 2-9 Units  2-9 Units Subcutaneous 4x Daily AC & at Bedtime Juan Manuel Page MD        isosorbide mononitrate (IMDUR) 24 hr tablet 30 mg  30 mg Oral Q24H Joanie Mendes APRN        Lidocaine HCl 4 % gel gel 1 application   1 application  Topical PRN Juan Manuel Page MD        magnesium hydroxide (MILK OF MAGNESIA) 400 MG/5ML suspension 15 mL  15 mL Oral Daily PRN Juan Manuel Page MD        melatonin tablet 6 mg  6 mg Oral Nightly Shruthi Isabel APRN        metoclopramide (REGLAN) tablet 5 mg  5 mg Oral TID AC Juan Manuel Page MD        ondansetron (ZOFRAN) tablet 4 mg  4 mg Oral Q6H PRN Juan Manuel Page MD        Or    ondansetron (ZOFRAN) injection 4 mg  4 mg Intravenous Q6H PRN Juan Manuel Page MD        oxyCODONE (oxyCONTIN) 12 hr tablet 10 mg  10 mg Oral Q12H Juan Manuel Page MD         oxyCODONE (ROXICODONE) immediate release tablet 5 mg  5 mg Oral Q4H PRN Juan Manuel Page MD        pantoprazole (PROTONIX) EC tablet 40 mg  40 mg Oral BID Juan Manuel Page MD        polyethylene glycol (MIRALAX) packet 17 g  17 g Oral Daily Juan Manuel Page MD        pregabalin (LYRICA) capsule 100 mg  100 mg Oral Daily Juan Manuel Page MD        pregabalin (LYRICA) capsule 200 mg  200 mg Oral Nightly Juan Manuel Page MD        rosuvastatin (CRESTOR) tablet 40 mg  40 mg Oral Nightly Juan Manuel Page MD        saccharomyces boulardii (FLORASTOR) capsule 250 mg  250 mg Oral BID Juan Manuel Page MD        sennosides-docusate (PERICOLACE) 8.6-50 MG per tablet 1 tablet  1 tablet Oral Nightly Juan Manuel Page MD        sodium chloride 0.9 % flush 10 mL  10 mL Intravenous Q12H Juan Manuel Page MD        sodium chloride 0.9 % flush 10 mL  10 mL Intravenous PRN Juan Manuel Page MD        sucralfate (CARAFATE) tablet 1 g  1 g Oral TID AC Juan Manuel Page MD        tamsulosin (FLOMAX) 24 hr capsule 0.4 mg  0.4 mg Oral Daily Juan Manuel Page MD        valsartan (DIOVAN) tablet 40 mg  40 mg Oral Nightly Juan Manuel Page MD           Past Medical History:  Past Medical History:   Diagnosis Date    Arthritis     Autonomic disease     CAD (coronary artery disease) 02/06/2017    Cervical radiculopathy 09/16/2021    Chronic constipation with acute exaccerbation 05/10/2021    Coronary artery disease     Degeneration of cervical intervertebral disc 08/11/2021    Diabetes mellitus     Diabetic foot ulcer 08/31/2020    Diabetic polyneuropathy associated with type 2 diabetes mellitus 01/18/2021    Elevated cholesterol     Gastroesophageal reflux disease 05/13/2019    Headache     HTN (hypertension), benign 05/03/2017    Hyperlipidemia     Hypertension     Mixed hyperlipidemia 02/07/2017    Multiple lung nodules 01/26/2020    5mm, 9 mm RLL identified 1/2020, not  present 10/2019.    Myocardial infarction     Osteomyelitis 01/22/2020    Osteomyelitis of fifth toe of right foot 10/07/2019    Pancreatitis     Persistent insomnia 01/20/2020    Renal disorder     Sleep apnea 02/06/2017    Sleep apnea with use of continuous positive airway pressure (CPAP)     NON-COMPLIANT    Slow transit constipation 01/16/2019    Spinal stenosis in cervical region 09/16/2021    Vitamin D deficiency 03/02/2021       Past Surgical History:  Past Surgical History:   Procedure Laterality Date    ABDOMINAL SURGERY      AMPUTATION FOOT / TOE Left 10/2021    5th digit     ANTERIOR CERVICAL DISCECTOMY W/ FUSION N/A 8/5/2022    Procedure: CERVICAL DISCECTOMY ANTERIOR WITH FUSION C5-6 with possible plating of C5-7 with neuromonitoring and 1 c-arm;  Surgeon: Karel Soliz MD;  Location:  [facility] OR;  Service: Neurosurgery;  Laterality: N/A;    APPENDECTOMY      BACK SURGERY      CARDIAC CATHETERIZATION Left 02/08/2021    Procedure: Left Heart Cath w poss intervention left anatomical snuff box acess;  Surgeon: Omkar Charles DO;  Location:  [facility] CATH INVASIVE LOCATION;  Service: Cardiology;  Laterality: Left;    CARDIAC SURGERY      CATARACT EXTRACTION      CERVICAL SPINE SURGERY      COLONOSCOPY N/A 01/31/2017    Normal exam repeat in 5 years    COLONOSCOPY N/A 02/11/2019    Mild acute inflammation    COLONOSCOPY W/ POLYPECTOMY  03/04/2014    Hyperplastic polyp    CORONARY ARTERY BYPASS GRAFT  10/2015    ENDOSCOPY  04/13/2011    Gastritis with hemorrhage    ENDOSCOPY N/A 05/05/2017    Normal exam    ENDOSCOPY N/A 02/11/2019    Gastritis    ENDOSCOPY N/A 09/01/2020    Non-erosive gastritis with hemorrhage    ENDOSCOPY N/A 02/10/2021    Esophagitis    FOOT SURGERY Left     INCISION AND DRAINAGE OF WOUND Left 09/2007    spider bite       Family History  Family History   Problem Relation Age of Onset    Colon cancer Father     Heart disease Father     Colon cancer Sister     Colon polyps Sister      Alzheimer's disease Mother     Coronary artery disease Sister     Coronary artery disease Sister        Social History  Social History     Socioeconomic History    Marital status:    Tobacco Use    Smoking status: Former     Types: Cigarettes     Quit date:      Years since quittin.7    Smokeless tobacco: Never    Tobacco comments:     smoked in highschool   Vaping Use    Vaping Use: Never used   Substance and Sexual Activity    Alcohol use: No    Drug use: No    Sexual activity: Defer       Review of Systems:  History obtained from chart review and the patient  General ROS: No fever or chills  Respiratory ROS: No cough, shortness of breath, wheezing  Cardiovascular ROS: No chest pain or palpitations  Gastrointestinal ROS: No abdominal pain or melena  Genito-Urinary ROS: No dysuria or hematuria  Psych ROS: No anxiety and depression  14 point ROS reviewed with the patient and negative except as noted above and in the HPI unless unable to obtain.    Objective:  No data found.      No intake or output data in the 24 hours ending 10/03/23 1415  General: awake/alert   Chest:  clear to auscultation bilaterally without respiratory distress  CVS: regular rate and rhythm  Abdominal: soft, nontender, positive bowel sounds  Extremities: leg edema  Skin: lle wound vac, warm/dry      Labs:  Results from last 7 days   Lab Units 10/02/23  0440 23  0530   WBC 10*3/mm3 6.52 6.49   HEMOGLOBIN g/dL 9.0* 9.2*   HEMATOCRIT % 29.4* 30.8*   PLATELETS 10*3/mm3 212 260           Results from last 7 days   Lab Units 10/02/23  0440 23  0530   SODIUM mmol/L 137 137   POTASSIUM mmol/L 5.2 4.7   CHLORIDE mmol/L 102 103   CO2 mmol/L 25.0 24.0   BUN mg/dL 31* 29*   CREATININE mg/dL 2.12* 2.09*   CALCIUM mg/dL 9.1 9.0   EGFR mL/min/1.73 33.5* 34.1*   GLUCOSE mg/dL 213* 384*         Radiology:   Imaging Results (Last 72 Hours)       ** No results found for the last 72 hours. **            Culture:  No results found  for: BLOODCX, URINECX, WOUNDCX, MRSACX, RESPCX, STOOLCX      Assessment   Acute kidney injury/ATN  Chronic kidney disease stage IIIb  Diabetes type 2 with diabetic nephropathy  Diabetic foot ulcer with surgical debridement and wound VAC  Chronic diastolic congestive heart failure  Wound infection with Proteus  Gastroparesis  Secondary hyperparathyroidism  Anemia and chronic kidney disease  Chest Pain    Plan:  Discussed with patient, nursing  Work-up reviewed today  Monitor labs per LTACH schedule  Calcitriol  Continue bumex at 2 mg po bid and follow up labs.     Vinny Hartley MD  10/3/2023  14:15 CDT

## 2023-10-03 NOTE — PROGRESS NOTES
Adult Nutrition  Assessment/PES    Patient Name:  Erick Luong  YOB: 1955  MRN: [id number]  Admit Date:  9/18/2023    Assessment Date:  10/3/2023       Reason for Assessment       Row Name 10/03/23 1525          Reason for Assessment    Reason For Assessment follow-up protocol     Diagnosis cardiac disease;infection/sepsis     Identified At Risk by Screening Criteria large or nonhealing wound, burn or pressure injury                    Nutrition/Diet History       Row Name 10/03/23 1526          Nutrition/Diet History    Typical Intake (Food/Fluid/EN/PN) Wound vac left heel continues. PO remains excellent. Receiving daily menu selections. Glu ranges 111-255.                    Labs/Tests/Procedures/Meds       Row Name 10/03/23 1526          Labs/Procedures/Meds    Lab Results Reviewed reviewed     Lab Results Comments Glu 111-255        Diagnostic Tests/Procedures    Diagnostic Test/Procedure Reviewed reviewed        Medications    Pertinent Medications Reviewed reviewed     Pertinent Medications Comments See MAR                    Physical Findings       Row Name 10/03/23 1526          Physical Findings    Overall Physical Appearance Room air, BM 10/2, left heel wound vac.                    Estimated/Assessed Needs - Anthropometrics       Row Name 10/03/23 1527          Anthropometrics    Weight for Calculation 139 kg (306 lb 1.6 oz)        Estimated/Assessed Needs    Additional Documentation Fluid Requirements (Group);KCAL/KG (Group);Protein Requirements (Group)        KCAL/KG    KCAL/KG 14 Kcal/Kg (kcal)     14 Kcal/Kg (kcal) 1943.844        Protein Requirements    Weight Used For Protein Calculations 80.7 kg (178 lb)  IBW     Est Protein Requirement Amount (gms/kg) 1.5 gm protein     Estimated Protein Requirements (gms/day) 121.11        Fluid Requirements    Fluid Requirements (mL/day) 1944     RDA Method (mL) 1944                    Nutrition Prescription Ordered       Row Name 10/03/23 1527           Nutrition Prescription PO    Current PO Diet Regular     Fluid Consistency Thin     Common Modifiers Cardiac;Consistent Carbohydrate                    Evaluation of Received Nutrient/Fluid Intake       Row Name 10/03/23 1527          Nutrient/Fluid Evaluation    Number of Days Evaluated 3 days        Fluid Intake Evaluation    Oral Fluid (mL) 1355        PO Evaluation    Number of Days PO Intake Evaluated 3 days     Number of Meals 8     % PO Intake 100                       Problem/Interventions:     Problem 2       Row Name 10/03/23 1527          Nutrition Diagnoses Problem 2    Problem 2 Nutrition Appropriate for Condition at this Time                        Intervention Goal       Row Name 10/03/23 1527          Intervention Goal    General Meet nutritional needs for age/condition;Reduce/improve symptoms;Disease management/therapy     PO Tolerate PO;Meet estimated needs     Weight No significant weight loss                    Nutrition Intervention       Row Name 10/03/23 1528          Nutrition Intervention    RD/Tech Action Care plan reviewd;Follow Tx progress                    Nutrition Prescription       Row Name 10/03/23 1528          Nutrition Prescription PO    PO Prescription Other (comment)  continue same protocol                    Education/Evaluation       Row Name 10/03/23 1528          Education    Education No discharge needs identified at this time        Monitor/Evaluation    Monitor Per protocol                     Electronically signed by:  Jie Green RDN, DEEPA  10/03/23 15:28 CDT

## 2023-10-04 LAB
ANION GAP SERPL CALCULATED.3IONS-SCNC: 14 MMOL/L (ref 5–15)
BUN SERPL-MCNC: 40 MG/DL (ref 8–23)
BUN/CREAT SERPL: 15.7 (ref 7–25)
CALCIUM SPEC-SCNC: 9 MG/DL (ref 8.6–10.5)
CHLORIDE SERPL-SCNC: 102 MMOL/L (ref 98–107)
CO2 SERPL-SCNC: 21 MMOL/L (ref 22–29)
CREAT SERPL-MCNC: 2.54 MG/DL (ref 0.76–1.27)
EGFRCR SERPLBLD CKD-EPI 2021: 27 ML/MIN/1.73
GLUCOSE BLDC GLUCOMTR-MCNC: 112 MG/DL (ref 70–130)
GLUCOSE BLDC GLUCOMTR-MCNC: 217 MG/DL (ref 70–130)
GLUCOSE BLDC GLUCOMTR-MCNC: 233 MG/DL (ref 70–130)
GLUCOSE BLDC GLUCOMTR-MCNC: 306 MG/DL (ref 70–130)
GLUCOSE SERPL-MCNC: 78 MG/DL (ref 65–99)
POTASSIUM SERPL-SCNC: 4.7 MMOL/L (ref 3.5–5.2)
SODIUM SERPL-SCNC: 137 MMOL/L (ref 136–145)

## 2023-10-04 PROCEDURE — 63710000001 INSULIN LISPRO (HUMAN) PER 5 UNITS: Performed by: INTERNAL MEDICINE

## 2023-10-04 PROCEDURE — 80048 BASIC METABOLIC PNL TOTAL CA: CPT | Performed by: INTERNAL MEDICINE

## 2023-10-04 PROCEDURE — 25010000002 HYDROMORPHONE 1 MG/ML SOLUTION: Performed by: INTERNAL MEDICINE

## 2023-10-04 PROCEDURE — 25010000002 CEFTRIAXONE PER 250 MG: Performed by: INTERNAL MEDICINE

## 2023-10-04 PROCEDURE — 97164 PT RE-EVAL EST PLAN CARE: CPT | Performed by: PHYSICAL THERAPIST

## 2023-10-04 PROCEDURE — 82948 REAGENT STRIP/BLOOD GLUCOSE: CPT

## 2023-10-04 PROCEDURE — 63710000001 INSULIN DETEMIR PER 5 UNITS: Performed by: INTERNAL MEDICINE

## 2023-10-04 PROCEDURE — 97535 SELF CARE MNGMENT TRAINING: CPT

## 2023-10-04 NOTE — PROGRESS NOTES
Nephrology ([facility]) Progress Note      Patient:  Erick Luong  YOB: 1955  Date of Service: 10/4/2023  MRN: [id number]   Acct: [id number]   Primary Care Physician: Del Shetty MD  Advance Directive:   There are no questions and answers to display.     Admit Date: 9/18/2023       Hospital Day: 0  Referring Provider: Juan Manuel Page MD      Patient personally seen and examined.  Complete chart including Consults, Notes, Operative Reports, Labs, Cardiology, and Radiology studies reviewed as able.        Subjective:  Erick Luong is a 68 y.o. male for whom we were consulted for evaluation and treatment of chronic kidney disease stage IIIb.  He has stage IIIb chronic kidney disease baseline and follows with Dr. Lomas in the office as he has diabetic nephropathy.  He has a history of insulin-dependent type 2 diabetes, hypertension, abdominal obesity, obstructive sleep apnea, diabetic foot ulcer and coronary artery disease.  He was accepted as a transfer from [facility].  Patient was admitted at [facility] by Dr. Gomes and extensive debridement of left foot wound at plantar aspect.  Postoperative wound measuring 6 x 17 x 4 cm with exposed bone.  Surgical culture returned positive for Proteus Mirabills.  Patient needed long-term IV antibiotics.  He is now admitted for wound care, long-term IV antibiotics, chronic kidney disease and treatment of diastolic congestive heart failure.  Patient also has history of gastroparesis.    Today, he offered no new complaints. He has less leg edema and he has been less dyspneic.    Temp- 98.1  Pulse- 72  Resp- 18  BP- 134/62      Allergies:  Cefepime, Bactrim [sulfamethoxazole-trimethoprim], Vancomycin, Zolpidem, Zolpidem tartrate, and Metronidazole    Home Meds:  Medications Prior to Admission   Medication Sig Dispense Refill Last Dose    amitriptyline (ELAVIL) 25 MG tablet Take 2 tablets by mouth Daily at bedtime.  60 tablet 1     amoxicillin-clavulanate (Augmentin) 500-125 MG per tablet Take 1 tablet by mouth every 12 hours with meals for 7 days. 14 tablet 0     ascorbic acid (VITAMIN C) 1000 MG tablet Take 1 tablet by mouth Daily. 30 tablet 3     Aspirin 81 MG capsule Take 81 mg by mouth Daily.       bumetanide (BUMEX) 2 MG tablet Take 1 tablet by mouth Daily. Take 2 tablets in morning;1 tablet at night       busPIRone (BUSPAR) 10 MG tablet Take 1 tablet by mouth 2 (Two) Times a Day As Needed. 100 tablet 3     calcitriol (ROCALTROL) 0.5 MCG capsule Take 1 capsule by mouth Daily. 90 capsule 4     carvedilol (COREG) 6.25 MG tablet Take 1 tablet by mouth 2 (Two) Times a Day. 180 tablet 4     Cholecalciferol (D-5000) 125 MCG (5000 UT) tablet Take 1 tablet by mouth Daily Before Lunch. 30 tablet 2     cloNIDine (CATAPRES) 0.1 MG tablet Take 1 tablet by mouth Every 12 (Twelve) Hours. 60 tablet 2     Diclofenac Sodium (VOLTAREN) 1 % gel gel Apply 2 g topically to the appropriate area as directed 4 (Four) Times a Day As Needed. 300 g 11     donepezil (ARICEPT) 10 MG tablet Take 1 tablet by mouth Daily. 90 tablet 4     Dulaglutide (Trulicity) 4.5 MG/0.5ML solution pen-injector Inject 0.5 mL under the skin into the appropriate area as directed 1 (One) Time Per Week. 2 mL 11     empagliflozin (JARDIANCE) 25 MG tablet tablet Take 1 tablet by mouth Daily. 30 tablet 2     HYDROcodone-acetaminophen (NORCO) 7.5-325 MG per tablet Take 1 tablet by mouth 2 (Two) Times a Day As Needed. 40 tablet 0     Insulin Regular Human, Conc, (HumuLIN R U-500 KwikPen) 500 UNIT/ML solution pen-injector CONCENTRATED injection Inject 120 Units under the skin into the appropriate area as directed 2 (Two) Times a Day with breakfast and dinner. (Patient taking differently: Inject 120 Units under the skin into the appropriate area as directed 3 (Three) Times a Day Before Meals.) 18 mL 5     midodrine (PROAMATINE) 10 MG tablet Take 1 tablet by mouth 3 (Three) Times  a Day. 270 tablet 4     ondansetron ODT (ZOFRAN-ODT) 8 MG disintegrating tablet Place 1 tablet under tongue 3 times a day as needed. 25 tablet 1     pantoprazole (Protonix) 40 MG EC tablet Take 1 tablet by mouth 2 (Two) Times a Day. 180 tablet 4     polyethylene glycol (MIRALAX) 17 g packet Take 17 g by mouth Daily. Obtain OTC       pregabalin (LYRICA) 100 MG capsule Take 1 capsule by mouth Daily With Breakfast AND 2 capsules Every Night. 90 capsule 2     rosuvastatin (CRESTOR) 40 MG tablet Take 1 tablet by mouth Every Night. 90 tablet 3     sennosides-docusate (PERICOLACE) 8.6-50 MG per tablet Take 1 tablet by mouth Every Night. Obtain OTC       sennosides-docusate (PERICOLACE) 8.6-50 MG per tablet Take 1 tablet by mouth every night at bedtime. 30 tablet 2     sodium hypochlorite (DAKIN'S 1/4 STRENGTH) 0.125 % solution topical solution 0.125% Apply to affected area twice daily 473 mL 3     tamsulosin (FLOMAX) 0.4 MG capsule 24 hr capsule Take 1 capsule by mouth Daily. 90 capsule 1        Medicines:  Current Facility-Administered Medications   Medication Dose Route Frequency Provider Last Rate Last Admin    acetaminophen (TYLENOL) tablet 650 mg  650 mg Oral Q4H PRN Juan Manuel Page MD        Or    acetaminophen (TYLENOL) suppository 650 mg  650 mg Rectal Q4H PRN Juan Manuel Page MD        aluminum-magnesium hydroxide-simethicone (MAALOX MAX) 400-400-40 MG/5ML suspension 30 mL  30 mL Oral Q4H PRN Juan Manuel Page MD        apixaban (ELIQUIS) tablet 5 mg  5 mg Oral BID With Meals Juan Manuel Page MD        bumetanide (BUMEX) tablet 2 mg  2 mg Oral BID Willy Arteaga MD        busPIRone (BUSPAR) tablet 10 mg  10 mg Oral Q12H PRN Juna Manuel Page MD        calcitriol (ROCALTROL) capsule 0.5 mcg  0.5 mcg Oral Daily Juan Manuel Page MD        carvedilol (COREG) tablet 3.125 mg  3.125 mg Oral BID With Meals Juan Manuel Page MD        cefTRIAXone (ROCEPHIN) 1,000 mg in  sodium chloride 0.9 % 100 mL IVPB  1,000 mg Intravenous Q24H Juan Manuel Page MD        cholecalciferol (VITAMIN D3) tablet 5,000 Units  5,000 Units Oral Daily Juan Manuel Page MD        dextrose (D50W) (25 g/50 mL) IV injection 25 g  25 g Intravenous Q15 Min PRN Juan Manuel Page MD        dextrose (GLUTOSE) oral gel 15 g  15 g Oral Q15 Min PRN Juan Manuel Page MD        docusate sodium (COLACE) capsule 200 mg  200 mg Oral BID Juan Manuel Page MD        donepezil (ARICEPT) tablet 10 mg  10 mg Oral Daily Juan Manuel Page MD        Dulaglutide solution pen-injector 4.5 mg  4.5 mg Subcutaneous Weekly Juan Manuel Page MD        glucagon (GLUCAGEN) injection 1 mg  1 mg Intramuscular Q15 Min PRN Juan Manuel Page MD        HYDROmorphone (DILAUDID) injection 1 mg  1 mg Intravenous Q6H PRN Juan Manuel Page MD        insulin detemir (LEVEMIR) injection 30 Units  30 Units Subcutaneous Daily Juan Manuel Page MD        Insulin Lispro (humaLOG) injection 2-9 Units  2-9 Units Subcutaneous 4x Daily AC & at Bedtime Juan Manuel Page MD        isosorbide mononitrate (IMDUR) 24 hr tablet 30 mg  30 mg Oral Q24H Joanie Mendes APRN        Lidocaine HCl 4 % gel gel 1 application   1 application  Topical PRN Juan Manuel Page MD        magnesium hydroxide (MILK OF MAGNESIA) 400 MG/5ML suspension 15 mL  15 mL Oral Daily PRN Juan Manuel Page MD        melatonin tablet 6 mg  6 mg Oral Nightly Shruthi Isabel APRN        metoclopramide (REGLAN) tablet 5 mg  5 mg Oral TID AC Juan Manuel Page MD        ondansetron (ZOFRAN) tablet 4 mg  4 mg Oral Q6H PRN Juan Manuel Page MD        Or    ondansetron (ZOFRAN) injection 4 mg  4 mg Intravenous Q6H PRN Juan Manuel Page MD        oxyCODONE (oxyCONTIN) 12 hr tablet 10 mg  10 mg Oral Q12H Juan Manuel Page MD        oxyCODONE (ROXICODONE) immediate release tablet 5 mg  5 mg Oral Q4H PRN Camilo  Juan Manuel Dillon MD        pantoprazole (PROTONIX) EC tablet 40 mg  40 mg Oral BID Juan Manuel Page MD        polyethylene glycol (MIRALAX) packet 17 g  17 g Oral Daily Juan Manuel Page MD        pregabalin (LYRICA) capsule 100 mg  100 mg Oral Daily Juan Manuel Page MD        pregabalin (LYRICA) capsule 200 mg  200 mg Oral Nightly Juan Manuel Page MD        rosuvastatin (CRESTOR) tablet 40 mg  40 mg Oral Nightly Juan Manuel Page MD        saccharomyces boulardii (FLORASTOR) capsule 250 mg  250 mg Oral BID Juan Manuel Page MD        sennosides-docusate (PERICOLACE) 8.6-50 MG per tablet 1 tablet  1 tablet Oral Nightly Juan Manuel Page MD        sodium chloride 0.9 % flush 10 mL  10 mL Intravenous Q12H Juan Manuel Page MD        sodium chloride 0.9 % flush 10 mL  10 mL Intravenous PRN Juan Manuel Page MD        sucralfate (CARAFATE) tablet 1 g  1 g Oral TID AC Juan Manuel Page MD        tamsulosin (FLOMAX) 24 hr capsule 0.4 mg  0.4 mg Oral Daily Juan Manuel Page MD        valsartan (DIOVAN) tablet 40 mg  40 mg Oral Nightly Juan Manuel Page MD           Past Medical History:  Past Medical History:   Diagnosis Date    Arthritis     Autonomic disease     CAD (coronary artery disease) 02/06/2017    Cervical radiculopathy 09/16/2021    Chronic constipation with acute exaccerbation 05/10/2021    Coronary artery disease     Degeneration of cervical intervertebral disc 08/11/2021    Diabetes mellitus     Diabetic foot ulcer 08/31/2020    Diabetic polyneuropathy associated with type 2 diabetes mellitus 01/18/2021    Elevated cholesterol     Gastroesophageal reflux disease 05/13/2019    Headache     HTN (hypertension), benign 05/03/2017    Hyperlipidemia     Hypertension     Mixed hyperlipidemia 02/07/2017    Multiple lung nodules 01/26/2020    5mm, 9 mm RLL identified 1/2020, not present 10/2019.    Myocardial infarction     Osteomyelitis 01/22/2020     Osteomyelitis of fifth toe of right foot 10/07/2019    Pancreatitis     Persistent insomnia 01/20/2020    Renal disorder     Sleep apnea 02/06/2017    Sleep apnea with use of continuous positive airway pressure (CPAP)     NON-COMPLIANT    Slow transit constipation 01/16/2019    Spinal stenosis in cervical region 09/16/2021    Vitamin D deficiency 03/02/2021       Past Surgical History:  Past Surgical History:   Procedure Laterality Date    ABDOMINAL SURGERY      AMPUTATION FOOT / TOE Left 10/2021    5th digit     ANTERIOR CERVICAL DISCECTOMY W/ FUSION N/A 8/5/2022    Procedure: CERVICAL DISCECTOMY ANTERIOR WITH FUSION C5-6 with possible plating of C5-7 with neuromonitoring and 1 c-arm;  Surgeon: Karel Soliz MD;  Location:  [facility] OR;  Service: Neurosurgery;  Laterality: N/A;    APPENDECTOMY      BACK SURGERY      CARDIAC CATHETERIZATION Left 02/08/2021    Procedure: Left Heart Cath w poss intervention left anatomical snuff box acess;  Surgeon: Omkar Charles DO;  Location:  [facility] CATH INVASIVE LOCATION;  Service: Cardiology;  Laterality: Left;    CARDIAC SURGERY      CATARACT EXTRACTION      CERVICAL SPINE SURGERY      COLONOSCOPY N/A 01/31/2017    Normal exam repeat in 5 years    COLONOSCOPY N/A 02/11/2019    Mild acute inflammation    COLONOSCOPY W/ POLYPECTOMY  03/04/2014    Hyperplastic polyp    CORONARY ARTERY BYPASS GRAFT  10/2015    ENDOSCOPY  04/13/2011    Gastritis with hemorrhage    ENDOSCOPY N/A 05/05/2017    Normal exam    ENDOSCOPY N/A 02/11/2019    Gastritis    ENDOSCOPY N/A 09/01/2020    Non-erosive gastritis with hemorrhage    ENDOSCOPY N/A 02/10/2021    Esophagitis    FOOT SURGERY Left     INCISION AND DRAINAGE OF WOUND Left 09/2007    spider bite       Family History  Family History   Problem Relation Age of Onset    Colon cancer Father     Heart disease Father     Colon cancer Sister     Colon polyps Sister     Alzheimer's disease Mother     Coronary artery disease Sister      Coronary artery disease Sister        Social History  Social History     Socioeconomic History    Marital status:    Tobacco Use    Smoking status: Former     Types: Cigarettes     Quit date:      Years since quittin.7    Smokeless tobacco: Never    Tobacco comments:     smoked in highschool   Vaping Use    Vaping Use: Never used   Substance and Sexual Activity    Alcohol use: No    Drug use: No    Sexual activity: Defer       Review of Systems:  History obtained from chart review and the patient  General ROS: No fever or chills  Respiratory ROS: No cough, shortness of breath, wheezing  Cardiovascular ROS: No chest pain or palpitations  Gastrointestinal ROS: No abdominal pain or melena  Genito-Urinary ROS: No dysuria or hematuria  Psych ROS: No anxiety and depression  14 point ROS reviewed with the patient and negative except as noted above and in the HPI unless unable to obtain.    Objective:  No data found.      No intake or output data in the 24 hours ending 10/04/23 1224  General: awake/alert   Chest:  clear to auscultation bilaterally without respiratory distress  CVS: regular rate and rhythm  Abdominal: soft, nontender, positive bowel sounds  Extremities: leg edema  Skin: lle wound vac, warm/dry      Labs:  Results from last 7 days   Lab Units 10/02/23  0440 23  0530   WBC 10*3/mm3 6.52 6.49   HEMOGLOBIN g/dL 9.0* 9.2*   HEMATOCRIT % 29.4* 30.8*   PLATELETS 10*3/mm3 212 260           Results from last 7 days   Lab Units 10/04/23  0610 10/02/23  0440 23  0530   SODIUM mmol/L 137 137 137   POTASSIUM mmol/L 4.7 5.2 4.7   CHLORIDE mmol/L 102 102 103   CO2 mmol/L 21.0* 25.0 24.0   BUN mg/dL 40* 31* 29*   CREATININE mg/dL 2.54* 2.12* 2.09*   CALCIUM mg/dL 9.0 9.1 9.0   EGFR mL/min/1.73 27.0* 33.5* 34.1*   GLUCOSE mg/dL 78 213* 384*         Radiology:   Imaging Results (Last 72 Hours)       ** No results found for the last 72 hours. **            Culture:  No results found for: BLOODCX,  URINECX, WOUNDCX, MRSACX, RESPCX, STOOLCX      Assessment   Acute kidney injury/ATN  Chronic kidney disease stage IIIb  Diabetes type 2 with diabetic nephropathy  Diabetic foot ulcer with surgical debridement and wound VAC  Chronic diastolic congestive heart failure  Wound infection with Proteus  Gastroparesis  Secondary hyperparathyroidism  Anemia and chronic kidney disease  Chest Pain    Plan:  Discussed with patient, nursing  Work-up reviewed today  Monitor labs   Calcitriol  Continue bumex at 2 mg po bid for now cinthia[ite the mild rise in serum creatinine.    Vinny Hartley MD  10/4/2023  12:24 CDT

## 2023-10-04 NOTE — PROGRESS NOTES
Formerly [facility]  NIMESH Parkinson APRN        Internal Medicine Progress Note    10/4/2023   09:56 CDT    Name:  Erick Luong  MRN:    [id number]     Acct:     [id number]   Room:  [address] [place] [address] Day: 0     Admit Date: 9/18/2023  6:30 PM  PCP: Del Shetty MD    Subjective:     C/C: weakness, fatigue    Interval History: Status:  improved. Up to chair. No family at bedside. Woke from sleep. Afebrile. Blood sugars remain elevated but stable. No new concerns today. Discharge planning underway.     Review of Systems   Constitutional: Positive for malaise/fatigue. Negative for chills, decreased appetite, weight gain and weight loss.   HENT:  Negative for congestion, ear discharge, hoarse voice and tinnitus.    Eyes:  Negative for blurred vision, discharge, visual disturbance and visual halos.   Cardiovascular:  Negative for chest pain, claudication, dyspnea on exertion, irregular heartbeat, leg swelling, orthopnea and paroxysmal nocturnal dyspnea.   Respiratory:  Negative for cough, shortness of breath, sputum production and wheezing.    Endocrine: Negative for cold intolerance, heat intolerance and polyuria.   Hematologic/Lymphatic: Negative for adenopathy. Does not bruise/bleed easily.   Skin:  Positive for poor wound healing. Negative for dry skin, itching and suspicious lesions.   Musculoskeletal:  Positive for neck pain. Negative for arthritis, back pain, falls, joint pain, muscle weakness and myalgias.   Gastrointestinal:  Negative for abdominal pain, constipation, diarrhea, dysphagia and hematemesis.   Genitourinary:  Negative for bladder incontinence, dysuria and frequency.   Neurological:  Positive for weakness. Negative for aphonia, disturbances in coordination and dizziness.   Psychiatric/Behavioral:  Negative for altered mental status, depression, memory loss and substance abuse. The patient does not have insomnia and is not nervous/anxious.        Medications:     Allergies:  "  Allergies   Allergen Reactions    Cefepime Hives and Anaphylaxis    Bactrim [Sulfamethoxazole-Trimethoprim] Other (See Comments)     \"RENAL FAILURE\"    Vancomycin Itching    Zolpidem Unknown - High Severity     \"makes him crazy\"    Zolpidem Tartrate Unknown - Low Severity and Provider Review Needed    Metronidazole Rash       Current Meds:   Current Facility-Administered Medications:     acetaminophen (TYLENOL) tablet 650 mg, 650 mg, Oral, Q4H PRN **OR** acetaminophen (TYLENOL) suppository 650 mg, 650 mg, Rectal, Q4H PRN, Juan Manuel Page MD    aluminum-magnesium hydroxide-simethicone (MAALOX MAX) 400-400-40 MG/5ML suspension 30 mL, 30 mL, Oral, Q4H PRN, Juan Manuel Page MD    apixaban (ELIQUIS) tablet 5 mg, 5 mg, Oral, BID With Meals, Juan Maunel Page MD    bumetanide (BUMEX) tablet 2 mg, 2 mg, Oral, BID, Willy Arteaga MD    busPIRone (BUSPAR) tablet 10 mg, 10 mg, Oral, Q12H PRN, Juan Manuel Page MD    calcitriol (ROCALTROL) capsule 0.5 mcg, 0.5 mcg, Oral, Daily, Juan Manuel Page MD    carvedilol (COREG) tablet 3.125 mg, 3.125 mg, Oral, BID With Meals, Juan Manuel Page MD    cefTRIAXone (ROCEPHIN) 1,000 mg in sodium chloride 0.9 % 100 mL IVPB, 1,000 mg, Intravenous, Q24H, Juan Manuel Page MD    cholecalciferol (VITAMIN D3) tablet 5,000 Units, 5,000 Units, Oral, Daily, Juan Manuel Page MD    dextrose (D50W) (25 g/50 mL) IV injection 25 g, 25 g, Intravenous, Q15 Min PRN, Juan Manuel Page MD    dextrose (GLUTOSE) oral gel 15 g, 15 g, Oral, Q15 Min PRN, Juan Manuel Page MD    docusate sodium (COLACE) capsule 200 mg, 200 mg, Oral, BID, Juan Manuel Page MD    donepezil (ARICEPT) tablet 10 mg, 10 mg, Oral, Daily, Juan Manuel Page MD    Dulaglutide solution pen-injector 4.5 mg, 4.5 mg, Subcutaneous, Weekly, Juan Manuel Page MD    glucagon (GLUCAGEN) injection 1 mg, 1 mg, Intramuscular, Q15 Min PRN, Juan Manuel Page MD    " HYDROmorphone (DILAUDID) injection 1 mg, 1 mg, Intravenous, Q6H PRN, Juan Manuel Page MD    insulin detemir (LEVEMIR) injection 30 Units, 30 Units, Subcutaneous, Daily, Juan Manuel Page MD    Insulin Lispro (humaLOG) injection 2-9 Units, 2-9 Units, Subcutaneous, 4x Daily AC & at Bedtime, Juan Manuel Page MD    isosorbide mononitrate (IMDUR) 24 hr tablet 30 mg, 30 mg, Oral, Q24H, Joanie Mendes APRN    Lidocaine HCl 4 % gel gel 1 application , 1 application , Topical, PRN, Juan Manuel Page MD    magnesium hydroxide (MILK OF MAGNESIA) 400 MG/5ML suspension 15 mL, 15 mL, Oral, Daily PRN, Juan Manuel Page MD    melatonin tablet 6 mg, 6 mg, Oral, Nightly, Shruthi Isabel, SUSAN    metoclopramide (REGLAN) tablet 5 mg, 5 mg, Oral, TID AC, Juan Manuel Page MD    ondansetron (ZOFRAN) tablet 4 mg, 4 mg, Oral, Q6H PRN **OR** ondansetron (ZOFRAN) injection 4 mg, 4 mg, Intravenous, Q6H PRN, Juan Manuel Page MD    oxyCODONE (oxyCONTIN) 12 hr tablet 10 mg, 10 mg, Oral, Q12H, Juan Manuel Page MD    oxyCODONE (ROXICODONE) immediate release tablet 5 mg, 5 mg, Oral, Q4H PRN, Juan Manuel Page MD    pantoprazole (PROTONIX) EC tablet 40 mg, 40 mg, Oral, BID, Juan Manuel Page MD    polyethylene glycol (MIRALAX) packet 17 g, 17 g, Oral, Daily, Juan Manuel Page MD    pregabalin (LYRICA) capsule 100 mg, 100 mg, Oral, Daily, Juan Manuel Page MD    pregabalin (LYRICA) capsule 200 mg, 200 mg, Oral, Nightly, Juan Manuel Page MD    rosuvastatin (CRESTOR) tablet 40 mg, 40 mg, Oral, Nightly, Juan Manuel Page MD    saccharomyces boulardii (FLORASTOR) capsule 250 mg, 250 mg, Oral, BID, Juan Manuel Page MD    sennosides-docusate (PERICOLACE) 8.6-50 MG per tablet 1 tablet, 1 tablet, Oral, Nightly, Juan Manuel Page MD    sodium chloride 0.9 % flush 10 mL, 10 mL, Intravenous, Q12H, Juan Manuel Page MD    sodium chloride 0.9 % flush 10 mL, 10 mL,  Intravenous, PRN, Juan Manuel Page MD    sucralfate (CARAFATE) tablet 1 g, 1 g, Oral, TID AC, Juan Manuel Page MD    tamsulosin (FLOMAX) 24 hr capsule 0.4 mg, 0.4 mg, Oral, Daily, Juan Manuel Page MD    valsartan (DIOVAN) tablet 40 mg, 40 mg, Oral, Nightly, Juan Manuel Page MD    Data:     Code Status:    There are no questions and answers to display.       Family History   Problem Relation Age of Onset    Colon cancer Father     Heart disease Father     Colon cancer Sister     Colon polyps Sister     Alzheimer's disease Mother     Coronary artery disease Sister     Coronary artery disease Sister        Social History     Socioeconomic History    Marital status:    Tobacco Use    Smoking status: Former     Types: Cigarettes     Quit date:      Years since quittin.7    Smokeless tobacco: Never    Tobacco comments:     smoked in [employer] [place]   Vaping Use    Vaping Use: Never used   Substance and Sexual Activity    Alcohol use: No    Drug use: No    Sexual activity: Defer       Vitals:  T 98.1 P 72 R 18 Bp 134/82 Sp02 99% (room air)            I/O (24Hr):  No intake or output data in the 24 hours ending 10/04/23 0956    Labs and imaging:      Recent Results (from the past 12 hour(s))   Basic Metabolic Panel    Collection Time: 10/04/23  6:10 AM    Specimen: Blood   Result Value Ref Range    Glucose 78 65 - 99 mg/dL    BUN 40 (H) 8 - 23 mg/dL    Creatinine 2.54 (H) 0.76 - 1.27 mg/dL    Sodium 137 136 - 145 mmol/L    Potassium 4.7 3.5 - 5.2 mmol/L    Chloride 102 98 - 107 mmol/L    CO2 21.0 (L) 22.0 - 29.0 mmol/L    Calcium 9.0 8.6 - 10.5 mg/dL    BUN/Creatinine Ratio 15.7 7.0 - 25.0    Anion Gap 14.0 5.0 - 15.0 mmol/L    eGFR 27.0 (L) >60.0 mL/min/1.73   POC Glucose Once    Collection Time: 10/04/23  7:44 AM    Specimen: Blood   Result Value Ref Range    Glucose 112 70 - 130 mg/dL                               Physical Examination:        Physical Exam  Vitals and nursing note  reviewed.   Constitutional:       Appearance: Normal appearance. He is well-developed. He is obese.   HENT:      Head: Normocephalic and atraumatic.      Right Ear: External ear normal.      Left Ear: External ear normal.      Nose: Nose normal.      Mouth/Throat:      Mouth: Mucous membranes are moist.      Pharynx: Oropharynx is clear.   Eyes:      Pupils: Pupils are equal, round, and reactive to light.   Cardiovascular:      Rate and Rhythm: Normal rate. Rhythm irregular.      Heart sounds: Normal heart sounds.   Pulmonary:      Effort: Pulmonary effort is normal.      Breath sounds: Normal breath sounds.   Abdominal:      General: Bowel sounds are normal.      Palpations: Abdomen is soft.      Comments: obese   Musculoskeletal:         General: Normal range of motion.      Cervical back: Normal range of motion and neck supple.      Comments: Generalized weakness   Skin:     General: Skin is warm and dry.      Comments: Wound vac intact   Neurological:      Mental Status: He is alert and oriented to person, place, and time.      Deep Tendon Reflexes: Reflexes are normal and symmetric.   Psychiatric:         Behavior: Behavior normal.         Assessment:            * No active hospital problems. *    Past Medical History:   Diagnosis Date    Arthritis     Autonomic disease     CAD (coronary artery disease) 02/06/2017    Cervical radiculopathy 09/16/2021    Chronic constipation with acute exaccerbation 05/10/2021    Coronary artery disease     Degeneration of cervical intervertebral disc 08/11/2021    Diabetes mellitus     Diabetic foot ulcer 08/31/2020    Diabetic polyneuropathy associated with type 2 diabetes mellitus 01/18/2021    Elevated cholesterol     Gastroesophageal reflux disease 05/13/2019    Headache     HTN (hypertension), benign 05/03/2017    Hyperlipidemia     Hypertension     Mixed hyperlipidemia 02/07/2017    Multiple lung nodules 01/26/2020    5mm, 9 mm RLL identified 1/2020, not present 10/2019.     Myocardial infarction     Osteomyelitis 01/22/2020    Osteomyelitis of fifth toe of right foot 10/07/2019    Pancreatitis     Persistent insomnia 01/20/2020    Renal disorder     Sleep apnea 02/06/2017    Sleep apnea with use of continuous positive airway pressure (CPAP)     NON-COMPLIANT    Slow transit constipation 01/16/2019    Spinal stenosis in cervical region 09/16/2021    Vitamin D deficiency 03/02/2021        Plan:        Proteus wound infection s/p debridement  New onset atrial fibrillation  Acute on chronic diastolic CHF  CKD3  Multifactorial anemia  DM2 with hyperglycemia on long term insulin  BRITTNI  Chronic pain syndrome  Chronic neck pain with radiculopathy  Diabetic peripheral neuropathy  Hx CAD  GERD  Possible gastroparesis  Sepsis secondary to wound infection  Continue current treatment. Monitor counts. Increase activity. Labs in am. Wound care per wound care team. Continue IV antibiotics. Appreciate nephrology evaluation and treatment. Continue glycemic control efforts. Maintain patient safety. Continue pain control efforts. Appreciate cardiology evaluation and recommendations. Discharge planning.       Electronically signed by SUSAN Muñoz on 10/4/2023 at 09:56 CDT   I have discussed the care of Erick Luong, including pertinent history and exam findings, with the nurse practitioner.    I have seen and examined the patient and the key elements of all parts of the encounter have been performed by me.  I agree with the assessment, plan and orders as documented by SUSAN Haney, after I modified the exam findings and the plan of treatments and the final version is my approved version of the assessment.        Electronically signed by Juan Manuel Page MD on 10/4/2023 at 11:07 CDT

## 2023-10-05 LAB
ANION GAP SERPL CALCULATED.3IONS-SCNC: 12 MMOL/L (ref 5–15)
BASOPHILS # BLD AUTO: 0.05 10*3/MM3 (ref 0–0.2)
BASOPHILS NFR BLD AUTO: 0.7 % (ref 0–1.5)
BUN SERPL-MCNC: 42 MG/DL (ref 8–23)
BUN/CREAT SERPL: 16.5 (ref 7–25)
CALCIUM SPEC-SCNC: 9.4 MG/DL (ref 8.6–10.5)
CHLORIDE SERPL-SCNC: 100 MMOL/L (ref 98–107)
CO2 SERPL-SCNC: 28 MMOL/L (ref 22–29)
CREAT SERPL-MCNC: 2.55 MG/DL (ref 0.76–1.27)
CRP SERPL-MCNC: 1.68 MG/DL (ref 0–0.5)
DEPRECATED RDW RBC AUTO: 51.9 FL (ref 37–54)
EGFRCR SERPLBLD CKD-EPI 2021: 26.8 ML/MIN/1.73
EOSINOPHIL # BLD AUTO: 0.73 10*3/MM3 (ref 0–0.4)
EOSINOPHIL NFR BLD AUTO: 10.5 % (ref 0.3–6.2)
ERYTHROCYTE [DISTWIDTH] IN BLOOD BY AUTOMATED COUNT: 15.9 % (ref 12.3–15.4)
ERYTHROCYTE [SEDIMENTATION RATE] IN BLOOD: 58 MM/HR (ref 0–20)
GLUCOSE BLDC GLUCOMTR-MCNC: 147 MG/DL (ref 70–130)
GLUCOSE BLDC GLUCOMTR-MCNC: 237 MG/DL (ref 70–130)
GLUCOSE BLDC GLUCOMTR-MCNC: 268 MG/DL (ref 70–130)
GLUCOSE BLDC GLUCOMTR-MCNC: 323 MG/DL (ref 70–130)
GLUCOSE SERPL-MCNC: 78 MG/DL (ref 65–99)
HCT VFR BLD AUTO: 29.3 % (ref 37.5–51)
HGB BLD-MCNC: 8.8 G/DL (ref 13–17.7)
IMM GRANULOCYTES # BLD AUTO: 0.02 10*3/MM3 (ref 0–0.05)
IMM GRANULOCYTES NFR BLD AUTO: 0.3 % (ref 0–0.5)
LYMPHOCYTES # BLD AUTO: 1.95 10*3/MM3 (ref 0.7–3.1)
LYMPHOCYTES NFR BLD AUTO: 27.9 % (ref 19.6–45.3)
MCH RBC QN AUTO: 26.6 PG (ref 26.6–33)
MCHC RBC AUTO-ENTMCNC: 30 G/DL (ref 31.5–35.7)
MCV RBC AUTO: 88.5 FL (ref 79–97)
MONOCYTES # BLD AUTO: 0.71 10*3/MM3 (ref 0.1–0.9)
MONOCYTES NFR BLD AUTO: 10.2 % (ref 5–12)
NEUTROPHILS NFR BLD AUTO: 3.52 10*3/MM3 (ref 1.7–7)
NEUTROPHILS NFR BLD AUTO: 50.4 % (ref 42.7–76)
NRBC BLD AUTO-RTO: 0 /100 WBC (ref 0–0.2)
PLATELET # BLD AUTO: 244 10*3/MM3 (ref 140–450)
PMV BLD AUTO: 11.7 FL (ref 6–12)
POTASSIUM SERPL-SCNC: 4.2 MMOL/L (ref 3.5–5.2)
RBC # BLD AUTO: 3.31 10*6/MM3 (ref 4.14–5.8)
SODIUM SERPL-SCNC: 140 MMOL/L (ref 136–145)
WBC NRBC COR # BLD: 6.98 10*3/MM3 (ref 3.4–10.8)

## 2023-10-05 PROCEDURE — 82948 REAGENT STRIP/BLOOD GLUCOSE: CPT

## 2023-10-05 PROCEDURE — 80048 BASIC METABOLIC PNL TOTAL CA: CPT | Performed by: INTERNAL MEDICINE

## 2023-10-05 PROCEDURE — 97110 THERAPEUTIC EXERCISES: CPT

## 2023-10-05 PROCEDURE — 97116 GAIT TRAINING THERAPY: CPT

## 2023-10-05 PROCEDURE — 25010000002 HYDROMORPHONE 1 MG/ML SOLUTION: Performed by: INTERNAL MEDICINE

## 2023-10-05 PROCEDURE — 63710000001 INSULIN DETEMIR PER 5 UNITS: Performed by: INTERNAL MEDICINE

## 2023-10-05 PROCEDURE — 85025 COMPLETE CBC W/AUTO DIFF WBC: CPT | Performed by: INTERNAL MEDICINE

## 2023-10-05 PROCEDURE — 97535 SELF CARE MNGMENT TRAINING: CPT

## 2023-10-05 PROCEDURE — 86140 C-REACTIVE PROTEIN: CPT | Performed by: INTERNAL MEDICINE

## 2023-10-05 PROCEDURE — 63710000001 ONDANSETRON PER 8 MG: Performed by: INTERNAL MEDICINE

## 2023-10-05 PROCEDURE — 92507 TX SP LANG VOICE COMM INDIV: CPT | Performed by: SPEECH-LANGUAGE PATHOLOGIST

## 2023-10-05 PROCEDURE — 63710000001 INSULIN LISPRO (HUMAN) PER 5 UNITS: Performed by: INTERNAL MEDICINE

## 2023-10-05 PROCEDURE — 25010000002 CEFTRIAXONE PER 250 MG: Performed by: INTERNAL MEDICINE

## 2023-10-05 PROCEDURE — 85652 RBC SED RATE AUTOMATED: CPT | Performed by: INTERNAL MEDICINE

## 2023-10-05 NOTE — PROGRESS NOTES
Nephrology ([facility]) Progress Note      Patient:  Erick Luong  YOB: 1955  Date of Service: 10/5/2023  MRN: [id number]   Acct: [id number]   Primary Care Physician: Del Shetty MD  Advance Directive:   There are no questions and answers to display.     Admit Date: 9/18/2023       Hospital Day: 0  Referring Provider: Juan Manuel Page MD      Patient personally seen and examined.  Complete chart including Consults, Notes, Operative Reports, Labs, Cardiology, and Radiology studies reviewed as able.        Subjective:  Erick Luong is a 68 y.o. male for whom we were consulted for evaluation and treatment of chronic kidney disease stage IIIb.  He has stage IIIb chronic kidney disease baseline and follows with Dr. Lomas in the office as he has diabetic nephropathy.  He has a history of insulin-dependent type 2 diabetes, hypertension, abdominal obesity, obstructive sleep apnea, diabetic foot ulcer and coronary artery disease.  He was accepted as a transfer from [facility].  Patient was admitted at [facility] by Dr. Gomes and extensive debridement of left foot wound at plantar aspect.  Postoperative wound measuring 6 x 17 x 4 cm with exposed bone.  Surgical culture returned positive for Proteus Mirabills.  Patient needed long-term IV antibiotics.  He is now admitted for wound care, long-term IV antibiotics, chronic kidney disease and treatment of diastolic congestive heart failure.  Patient also has history of gastroparesis.    Today, he offered no new complaints. He was very concerned with the outcomes of his left foot infecton.    Vitals: reviewed.    Allergies:  Cefepime, Bactrim [sulfamethoxazole-trimethoprim], Vancomycin, Zolpidem, Zolpidem tartrate, and Metronidazole    Home Meds:  Medications Prior to Admission   Medication Sig Dispense Refill Last Dose    amitriptyline (ELAVIL) 25 MG tablet Take 2 tablets by mouth Daily at bedtime. 60 tablet 1      amoxicillin-clavulanate (Augmentin) 500-125 MG per tablet Take 1 tablet by mouth every 12 hours with meals for 7 days. 14 tablet 0     ascorbic acid (VITAMIN C) 1000 MG tablet Take 1 tablet by mouth Daily. 30 tablet 3     Aspirin 81 MG capsule Take 81 mg by mouth Daily.       bumetanide (BUMEX) 2 MG tablet Take 1 tablet by mouth Daily. Take 2 tablets in morning;1 tablet at night       busPIRone (BUSPAR) 10 MG tablet Take 1 tablet by mouth 2 (Two) Times a Day As Needed. 100 tablet 3     calcitriol (ROCALTROL) 0.5 MCG capsule Take 1 capsule by mouth Daily. 90 capsule 4     carvedilol (COREG) 6.25 MG tablet Take 1 tablet by mouth 2 (Two) Times a Day. 180 tablet 4     Cholecalciferol (D-5000) 125 MCG (5000 UT) tablet Take 1 tablet by mouth Daily Before Lunch. 30 tablet 2     cloNIDine (CATAPRES) 0.1 MG tablet Take 1 tablet by mouth Every 12 (Twelve) Hours. 60 tablet 2     Diclofenac Sodium (VOLTAREN) 1 % gel gel Apply 2 g topically to the appropriate area as directed 4 (Four) Times a Day As Needed. 300 g 11     donepezil (ARICEPT) 10 MG tablet Take 1 tablet by mouth Daily. 90 tablet 4     Dulaglutide (Trulicity) 4.5 MG/0.5ML solution pen-injector Inject 0.5 mL under the skin into the appropriate area as directed 1 (One) Time Per Week. 2 mL 11     empagliflozin (JARDIANCE) 25 MG tablet tablet Take 1 tablet by mouth Daily. 30 tablet 2     HYDROcodone-acetaminophen (NORCO) 7.5-325 MG per tablet Take 1 tablet by mouth 2 (Two) Times a Day As Needed. 40 tablet 0     Insulin Regular Human, Conc, (HumuLIN R U-500 KwikPen) 500 UNIT/ML solution pen-injector CONCENTRATED injection Inject 120 Units under the skin into the appropriate area as directed 2 (Two) Times a Day with breakfast and dinner. (Patient taking differently: Inject 120 Units under the skin into the appropriate area as directed 3 (Three) Times a Day Before Meals.) 18 mL 5     midodrine (PROAMATINE) 10 MG tablet Take 1 tablet by mouth 3 (Three) Times a Day. 270  tablet 4     ondansetron ODT (ZOFRAN-ODT) 8 MG disintegrating tablet Place 1 tablet under tongue 3 times a day as needed. 25 tablet 1     pantoprazole (Protonix) 40 MG EC tablet Take 1 tablet by mouth 2 (Two) Times a Day. 180 tablet 4     polyethylene glycol (MIRALAX) 17 g packet Take 17 g by mouth Daily. Obtain OTC       pregabalin (LYRICA) 100 MG capsule Take 1 capsule by mouth Daily With Breakfast AND 2 capsules Every Night. 90 capsule 2     rosuvastatin (CRESTOR) 40 MG tablet Take 1 tablet by mouth Every Night. 90 tablet 3     sennosides-docusate (PERICOLACE) 8.6-50 MG per tablet Take 1 tablet by mouth Every Night. Obtain OTC       sennosides-docusate (PERICOLACE) 8.6-50 MG per tablet Take 1 tablet by mouth every night at bedtime. 30 tablet 2     sodium hypochlorite (DAKIN'S 1/4 STRENGTH) 0.125 % solution topical solution 0.125% Apply to affected area twice daily 473 mL 3     tamsulosin (FLOMAX) 0.4 MG capsule 24 hr capsule Take 1 capsule by mouth Daily. 90 capsule 1        Medicines:  Current Facility-Administered Medications   Medication Dose Route Frequency Provider Last Rate Last Admin    acetaminophen (TYLENOL) tablet 650 mg  650 mg Oral Q4H PRN Juan Manuel Page MD        Or    acetaminophen (TYLENOL) suppository 650 mg  650 mg Rectal Q4H PRN Juan Manuel Page MD        aluminum-magnesium hydroxide-simethicone (MAALOX MAX) 400-400-40 MG/5ML suspension 30 mL  30 mL Oral Q4H PRN Juan Manuel Page MD        apixaban (ELIQUIS) tablet 5 mg  5 mg Oral BID With Meals Juan Manuel Page MD        bumetanide (BUMEX) tablet 2 mg  2 mg Oral BID Willy Arteaga MD        busPIRone (BUSPAR) tablet 10 mg  10 mg Oral Q12H PRN Juan Manuel Page MD        calcitriol (ROCALTROL) capsule 0.5 mcg  0.5 mcg Oral Daily Juan Manuel Page MD        carvedilol (COREG) tablet 3.125 mg  3.125 mg Oral BID With Meals Juan Manuel Page MD        cefTRIAXone (ROCEPHIN) 1,000 mg in sodium  chloride 0.9 % 100 mL IVPB  1,000 mg Intravenous Q24H Juan Manuel Page MD        cholecalciferol (VITAMIN D3) tablet 5,000 Units  5,000 Units Oral Daily Juan Manuel Page MD        dextrose (D50W) (25 g/50 mL) IV injection 25 g  25 g Intravenous Q15 Min PRN Juan Manuel Page MD        dextrose (GLUTOSE) oral gel 15 g  15 g Oral Q15 Min PRN Juan Manuel Page MD        docusate sodium (COLACE) capsule 200 mg  200 mg Oral BID Juan Manuel Page MD        donepezil (ARICEPT) tablet 10 mg  10 mg Oral Daily Juan Manuel Page MD        Dulaglutide solution pen-injector 4.5 mg  4.5 mg Subcutaneous Weekly Juan Manuel Page MD        glucagon (GLUCAGEN) injection 1 mg  1 mg Intramuscular Q15 Min PRN Juan Manuel Page MD        HYDROmorphone (DILAUDID) injection 1 mg  1 mg Intravenous Q6H PRN Juan Manuel Page MD        insulin detemir (LEVEMIR) injection 30 Units  30 Units Subcutaneous Daily Juan Manuel Page MD        Insulin Lispro (humaLOG) injection 2-9 Units  2-9 Units Subcutaneous 4x Daily AC & at Bedtime Juan Manuel Page MD        isosorbide mononitrate (IMDUR) 24 hr tablet 30 mg  30 mg Oral Q24H Joanie Mendes APRN        Lidocaine HCl 4 % gel gel 1 application   1 application  Topical PRN Juan Manuel Page MD        magnesium hydroxide (MILK OF MAGNESIA) 400 MG/5ML suspension 15 mL  15 mL Oral Daily PRN Juan Manuel Page MD        melatonin tablet 6 mg  6 mg Oral Nightly Shruthi Isabel APRN        metoclopramide (REGLAN) tablet 5 mg  5 mg Oral TID AC Juan Manuel Page MD        ondansetron (ZOFRAN) tablet 4 mg  4 mg Oral Q6H PRN Juan Manuel Page MD        Or    ondansetron (ZOFRAN) injection 4 mg  4 mg Intravenous Q6H PRN Juan Manuel Page MD        oxyCODONE (oxyCONTIN) 12 hr tablet 10 mg  10 mg Oral Q12H Juan Manuel Page MD        oxyCODONE (ROXICODONE) immediate release tablet 5 mg  5 mg Oral Q4H PRN Juan Manuel Page  MD Santana        pantoprazole (PROTONIX) EC tablet 40 mg  40 mg Oral BID Juan Manuel Page MD        polyethylene glycol (MIRALAX) packet 17 g  17 g Oral Daily Juan Manuel Page MD        pregabalin (LYRICA) capsule 100 mg  100 mg Oral Daily Juan Manuel Page MD        pregabalin (LYRICA) capsule 200 mg  200 mg Oral Nightly Juan Manuel Page MD        rosuvastatin (CRESTOR) tablet 40 mg  40 mg Oral Nightly Juan Manuel Page MD        saccharomyces boulardii (FLORASTOR) capsule 250 mg  250 mg Oral BID Juan Manuel Page MD        sennosides-docusate (PERICOLACE) 8.6-50 MG per tablet 1 tablet  1 tablet Oral Nightly Juan Manuel Page MD        sodium chloride 0.9 % flush 10 mL  10 mL Intravenous Q12H Juan Manuel Page MD        sodium chloride 0.9 % flush 10 mL  10 mL Intravenous PRN Juan Manuel Page MD        sucralfate (CARAFATE) tablet 1 g  1 g Oral TID AC Juan Manuel Page MD        tamsulosin (FLOMAX) 24 hr capsule 0.4 mg  0.4 mg Oral Daily Juan Manuel Page MD        valsartan (DIOVAN) tablet 40 mg  40 mg Oral Nightly Juan Manuel Page MD           Past Medical History:  Past Medical History:   Diagnosis Date    Arthritis     Autonomic disease     CAD (coronary artery disease) 02/06/2017    Cervical radiculopathy 09/16/2021    Chronic constipation with acute exaccerbation 05/10/2021    Coronary artery disease     Degeneration of cervical intervertebral disc 08/11/2021    Diabetes mellitus     Diabetic foot ulcer 08/31/2020    Diabetic polyneuropathy associated with type 2 diabetes mellitus 01/18/2021    Elevated cholesterol     Gastroesophageal reflux disease 05/13/2019    Headache     HTN (hypertension), benign 05/03/2017    Hyperlipidemia     Hypertension     Mixed hyperlipidemia 02/07/2017    Multiple lung nodules 01/26/2020    5mm, 9 mm RLL identified 1/2020, not present 10/2019.    Myocardial infarction     Osteomyelitis 01/22/2020    Osteomyelitis  of fifth toe of right foot 10/07/2019    Pancreatitis     Persistent insomnia 01/20/2020    Renal disorder     Sleep apnea 02/06/2017    Sleep apnea with use of continuous positive airway pressure (CPAP)     NON-COMPLIANT    Slow transit constipation 01/16/2019    Spinal stenosis in cervical region 09/16/2021    Vitamin D deficiency 03/02/2021       Past Surgical History:  Past Surgical History:   Procedure Laterality Date    ABDOMINAL SURGERY      AMPUTATION FOOT / TOE Left 10/2021    5th digit     ANTERIOR CERVICAL DISCECTOMY W/ FUSION N/A 8/5/2022    Procedure: CERVICAL DISCECTOMY ANTERIOR WITH FUSION C5-6 with possible plating of C5-7 with neuromonitoring and 1 c-arm;  Surgeon: Karel Soliz MD;  Location:  [facility] OR;  Service: Neurosurgery;  Laterality: N/A;    APPENDECTOMY      BACK SURGERY      CARDIAC CATHETERIZATION Left 02/08/2021    Procedure: Left Heart Cath w poss intervention left anatomical snuff box acess;  Surgeon: Omkar Charles DO;  Location:  [facility] CATH INVASIVE LOCATION;  Service: Cardiology;  Laterality: Left;    CARDIAC SURGERY      CATARACT EXTRACTION      CERVICAL SPINE SURGERY      COLONOSCOPY N/A 01/31/2017    Normal exam repeat in 5 years    COLONOSCOPY N/A 02/11/2019    Mild acute inflammation    COLONOSCOPY W/ POLYPECTOMY  03/04/2014    Hyperplastic polyp    CORONARY ARTERY BYPASS GRAFT  10/2015    ENDOSCOPY  04/13/2011    Gastritis with hemorrhage    ENDOSCOPY N/A 05/05/2017    Normal exam    ENDOSCOPY N/A 02/11/2019    Gastritis    ENDOSCOPY N/A 09/01/2020    Non-erosive gastritis with hemorrhage    ENDOSCOPY N/A 02/10/2021    Esophagitis    FOOT SURGERY Left     INCISION AND DRAINAGE OF WOUND Left 09/2007    spider bite       Family History  Family History   Problem Relation Age of Onset    Colon cancer Father     Heart disease Father     Colon cancer Sister     Colon polyps Sister     Alzheimer's disease Mother     Coronary artery disease Sister     Coronary artery  disease Sister        Social History  Social History     Socioeconomic History    Marital status:    Tobacco Use    Smoking status: Former     Types: Cigarettes     Quit date:      Years since quittin.7    Smokeless tobacco: Never    Tobacco comments:     smoked in highschool   Vaping Use    Vaping Use: Never used   Substance and Sexual Activity    Alcohol use: No    Drug use: No    Sexual activity: Defer       Review of Systems:  History obtained from chart review and the patient  General ROS: No fever or chills  Respiratory ROS: No cough, shortness of breath, wheezing  Cardiovascular ROS: No chest pain or palpitations  Gastrointestinal ROS: No abdominal pain or melena  Genito-Urinary ROS: No dysuria or hematuria  Psych ROS: No anxiety and depression  14 point ROS reviewed with the patient and negative except as noted above and in the HPI unless unable to obtain.    Objective:  No data found.      No intake or output data in the 24 hours ending 10/05/23 1457  General: awake/alert   Chest:  clear to auscultation bilaterally without respiratory distress  CVS: regular rate and rhythm  Abdominal: soft, nontender, positive bowel sounds  Extremities: leg edema, left foot attached to wound VAC    Skin: lle wound vac, warm/dry      Labs:  Results from last 7 days   Lab Units 10/05/23  0502 10/02/23  0440   WBC 10*3/mm3 6.98 6.52   HEMOGLOBIN g/dL 8.8* 9.0*   HEMATOCRIT % 29.3* 29.4*   PLATELETS 10*3/mm3 244 212           Results from last 7 days   Lab Units 10/05/23  0502 10/04/23  0610 10/02/23  0440   SODIUM mmol/L 140 137 137   POTASSIUM mmol/L 4.2 4.7 5.2   CHLORIDE mmol/L 100 102 102   CO2 mmol/L 28.0 21.0* 25.0   BUN mg/dL 42* 40* 31*   CREATININE mg/dL 2.55* 2.54* 2.12*   CALCIUM mg/dL 9.4 9.0 9.1   EGFR mL/min/1.73 26.8* 27.0* 33.5*   GLUCOSE mg/dL 78 78 213*         Radiology:   Imaging Results (Last 72 Hours)       ** No results found for the last 72 hours. **            Culture:  No results found  for: BLOODCX, URINECX, WOUNDCX, MRSACX, RESPCX, STOOLCX      Assessment   Acute kidney injury/ATN  Chronic kidney disease stage IIIb  Diabetes type 2 with diabetic nephropathy  Diabetic foot ulcer with surgical debridement and wound VAC  Chronic diastolic congestive heart failure  Wound infection with Proteus  Gastroparesis  Secondary hyperparathyroidism  Anemia and chronic kidney disease  Chest Pain      Plan:  Discussed with patient  Work-up reviewed today  Monitor labs   Calcitriol  Continue bumex at 2 mg since renal function is about stable.    Vinny Hartley MD  10/5/2023  14:57 CDT

## 2023-10-05 NOTE — PROGRESS NOTES
NIMESH Cadena APRN        Internal Medicine Progress Note    10/5/2023   12:19 CDT    Name:  Erick Luong  MRN:    [id number]     Acct:     [id number]   Room:  [address] [place] [address] Day: 0     Admit Date: 9/18/2023  6:30 PM  PCP: Del Shetty MD    Subjective:     C/C: weakness, fatigue    Interval History: Status:  improved. Up to chair. No family at bedside.  Afebrile. Blood sugars remain elevated but stable. No new concerns today. Less left foot pain today. Counts stable. Discharge planning underway.     Review of Systems   Constitutional: Positive for malaise/fatigue. Negative for chills, decreased appetite, weight gain and weight loss.   HENT:  Negative for congestion, ear discharge, hoarse voice and tinnitus.    Eyes:  Negative for blurred vision, discharge, visual disturbance and visual halos.   Cardiovascular:  Negative for chest pain, claudication, dyspnea on exertion, irregular heartbeat, leg swelling, orthopnea and paroxysmal nocturnal dyspnea.   Respiratory:  Negative for cough, shortness of breath, sputum production and wheezing.    Endocrine: Negative for cold intolerance, heat intolerance and polyuria.   Hematologic/Lymphatic: Negative for adenopathy. Does not bruise/bleed easily.   Skin:  Positive for poor wound healing. Negative for dry skin, itching and suspicious lesions.   Musculoskeletal:  Positive for neck pain. Negative for arthritis, back pain, falls, joint pain, muscle weakness and myalgias.   Gastrointestinal:  Negative for abdominal pain, constipation, diarrhea, dysphagia and hematemesis.   Genitourinary:  Negative for bladder incontinence, dysuria and frequency.   Neurological:  Positive for weakness. Negative for aphonia, disturbances in coordination and dizziness.   Psychiatric/Behavioral:  Negative for altered mental status, depression, memory loss and substance abuse. The patient does not have insomnia and is not nervous/anxious.   "      Medications:     Allergies:   Allergies   Allergen Reactions    Cefepime Hives and Anaphylaxis    Bactrim [Sulfamethoxazole-Trimethoprim] Other (See Comments)     \"RENAL FAILURE\"    Vancomycin Itching    Zolpidem Unknown - High Severity     \"makes him crazy\"    Zolpidem Tartrate Unknown - Low Severity and Provider Review Needed    Metronidazole Rash       Current Meds:   Current Facility-Administered Medications:     acetaminophen (TYLENOL) tablet 650 mg, 650 mg, Oral, Q4H PRN **OR** acetaminophen (TYLENOL) suppository 650 mg, 650 mg, Rectal, Q4H PRN, Juan Manuel Page MD    aluminum-magnesium hydroxide-simethicone (MAALOX MAX) 400-400-40 MG/5ML suspension 30 mL, 30 mL, Oral, Q4H PRN, Juan Manuel Page MD    apixaban (ELIQUIS) tablet 5 mg, 5 mg, Oral, BID With Meals, Juan Manuel Page MD    bumetanide (BUMEX) tablet 2 mg, 2 mg, Oral, BID, Willy Arteaga MD    busPIRone (BUSPAR) tablet 10 mg, 10 mg, Oral, Q12H PRN, Juan Manuel Page MD    calcitriol (ROCALTROL) capsule 0.5 mcg, 0.5 mcg, Oral, Daily, Juan Manuel Page MD    carvedilol (COREG) tablet 3.125 mg, 3.125 mg, Oral, BID With Meals, Juan Manuel Page MD    cefTRIAXone (ROCEPHIN) 1,000 mg in sodium chloride 0.9 % 100 mL IVPB, 1,000 mg, Intravenous, Q24H, Juan Manuel Page MD    cholecalciferol (VITAMIN D3) tablet 5,000 Units, 5,000 Units, Oral, Daily, Juan Manuel Page MD    dextrose (D50W) (25 g/50 mL) IV injection 25 g, 25 g, Intravenous, Q15 Min PRN, Juan Manuel Page MD    dextrose (GLUTOSE) oral gel 15 g, 15 g, Oral, Q15 Min PRN, Juan Manuel Page MD    docusate sodium (COLACE) capsule 200 mg, 200 mg, Oral, BID, Juan Manuel Page MD    donepezil (ARICEPT) tablet 10 mg, 10 mg, Oral, Daily, Juan Manuel Page MD    Dulaglutide solution pen-injector 4.5 mg, 4.5 mg, Subcutaneous, Weekly, Juan Manuel Page MD    glucagon (GLUCAGEN) injection 1 mg, 1 mg, Intramuscular, Q15 Min PRN, " Juan Manuel Page MD    HYDROmorphone (DILAUDID) injection 1 mg, 1 mg, Intravenous, Q6H PRN, Juan Manuel Page MD    insulin detemir (LEVEMIR) injection 30 Units, 30 Units, Subcutaneous, Daily, Juan Manuel Page MD    Insulin Lispro (humaLOG) injection 2-9 Units, 2-9 Units, Subcutaneous, 4x Daily AC & at Bedtime, Juan Manuel Page MD    isosorbide mononitrate (IMDUR) 24 hr tablet 30 mg, 30 mg, Oral, Q24H, Joanie Mendes APRN    Lidocaine HCl 4 % gel gel 1 application , 1 application , Topical, PRN, Juan Manuel Page MD    magnesium hydroxide (MILK OF MAGNESIA) 400 MG/5ML suspension 15 mL, 15 mL, Oral, Daily PRN, Juan Manuel Page MD    melatonin tablet 6 mg, 6 mg, Oral, Nightly, Shruthi Isabel APRN    metoclopramide (REGLAN) tablet 5 mg, 5 mg, Oral, TID AC, Juan Manuel Page MD    ondansetron (ZOFRAN) tablet 4 mg, 4 mg, Oral, Q6H PRN **OR** ondansetron (ZOFRAN) injection 4 mg, 4 mg, Intravenous, Q6H PRN, Juan Manuel Page MD    oxyCODONE (oxyCONTIN) 12 hr tablet 10 mg, 10 mg, Oral, Q12H, Juan Manuel Page MD    oxyCODONE (ROXICODONE) immediate release tablet 5 mg, 5 mg, Oral, Q4H PRN, Juan Manuel Page MD    pantoprazole (PROTONIX) EC tablet 40 mg, 40 mg, Oral, BID, Juan Manuel Page MD    polyethylene glycol (MIRALAX) packet 17 g, 17 g, Oral, Daily, Juan Manuel Page MD    pregabalin (LYRICA) capsule 100 mg, 100 mg, Oral, Daily, Juan Manuel Page MD    pregabalin (LYRICA) capsule 200 mg, 200 mg, Oral, Nightly, Juan Manuel Page MD    rosuvastatin (CRESTOR) tablet 40 mg, 40 mg, Oral, Nightly, Juan Manuel Page MD    saccharomyces boulardii (FLORASTOR) capsule 250 mg, 250 mg, Oral, BID, Juan Manuel Page MD    sennosides-docusate (PERICOLACE) 8.6-50 MG per tablet 1 tablet, 1 tablet, Oral, Nightly, Juan Manuel Page MD    sodium chloride 0.9 % flush 10 mL, 10 mL, Intravenous, Q12H, Juan Manuel Page MD    sodium chloride  0.9 % flush 10 mL, 10 mL, Intravenous, PRN, Juan Manuel Page MD    sucralfate (CARAFATE) tablet 1 g, 1 g, Oral, TID AC, Juan Manuel Page MD    tamsulosin (FLOMAX) 24 hr capsule 0.4 mg, 0.4 mg, Oral, Daily, Juan Manuel Page MD    valsartan (DIOVAN) tablet 40 mg, 40 mg, Oral, Nightly, Juan Manuel Page MD    Data:     Code Status:    There are no questions and answers to display.       Family History   Problem Relation Age of Onset    Colon cancer Father     Heart disease Father     Colon cancer Sister     Colon polyps Sister     Alzheimer's disease Mother     Coronary artery disease Sister     Coronary artery disease Sister        Social History     Socioeconomic History    Marital status:    Tobacco Use    Smoking status: Former     Types: Cigarettes     Quit date:      Years since quittin.7    Smokeless tobacco: Never    Tobacco comments:     smoked in [facility]   Vaping Use    Vaping Use: Never used   Substance and Sexual Activity    Alcohol use: No    Drug use: No    Sexual activity: Defer       Vitals:  T 98.4 P 63 R 20 Bp 103/66 Sp02 96% (room air)            I/O (24Hr):  No intake or output data in the 24 hours ending 10/05/23 1219    Labs and imaging:      Recent Results (from the past 12 hour(s))   Basic Metabolic Panel    Collection Time: 10/05/23  5:02 AM    Specimen: Blood   Result Value Ref Range    Glucose 78 65 - 99 mg/dL    BUN 42 (H) 8 - 23 mg/dL    Creatinine 2.55 (H) 0.76 - 1.27 mg/dL    Sodium 140 136 - 145 mmol/L    Potassium 4.2 3.5 - 5.2 mmol/L    Chloride 100 98 - 107 mmol/L    CO2 28.0 22.0 - 29.0 mmol/L    Calcium 9.4 8.6 - 10.5 mg/dL    BUN/Creatinine Ratio 16.5 7.0 - 25.0    Anion Gap 12.0 5.0 - 15.0 mmol/L    eGFR 26.8 (L) >60.0 mL/min/1.73   Sedimentation Rate    Collection Time: 10/05/23  5:02 AM    Specimen: Blood   Result Value Ref Range    Sed Rate 58 (H) 0 - 20 mm/hr   C-reactive Protein    Collection Time: 10/05/23  5:02 AM    Specimen: Blood    Result Value Ref Range    C-Reactive Protein 1.68 (H) 0.00 - 0.50 mg/dL   CBC Auto Differential    Collection Time: 10/05/23  5:02 AM    Specimen: Blood   Result Value Ref Range    WBC 6.98 3.40 - 10.80 10*3/mm3    RBC 3.31 (L) 4.14 - 5.80 10*6/mm3    Hemoglobin 8.8 (L) 13.0 - 17.7 g/dL    Hematocrit 29.3 (L) 37.5 - 51.0 %    MCV 88.5 79.0 - 97.0 fL    MCH 26.6 26.6 - 33.0 pg    MCHC 30.0 (L) 31.5 - 35.7 g/dL    RDW 15.9 (H) 12.3 - 15.4 %    RDW-SD 51.9 37.0 - 54.0 fl    MPV 11.7 6.0 - 12.0 fL    Platelets 244 140 - 450 10*3/mm3    Neutrophil % 50.4 42.7 - 76.0 %    Lymphocyte % 27.9 19.6 - 45.3 %    Monocyte % 10.2 5.0 - 12.0 %    Eosinophil % 10.5 (H) 0.3 - 6.2 %    Basophil % 0.7 0.0 - 1.5 %    Immature Grans % 0.3 0.0 - 0.5 %    Neutrophils, Absolute 3.52 1.70 - 7.00 10*3/mm3    Lymphocytes, Absolute 1.95 0.70 - 3.10 10*3/mm3    Monocytes, Absolute 0.71 0.10 - 0.90 10*3/mm3    Eosinophils, Absolute 0.73 (H) 0.00 - 0.40 10*3/mm3    Basophils, Absolute 0.05 0.00 - 0.20 10*3/mm3    Immature Grans, Absolute 0.02 0.00 - 0.05 10*3/mm3    nRBC 0.0 0.0 - 0.2 /100 WBC   POC Glucose Once    Collection Time: 10/05/23  7:03 AM    Specimen: Blood   Result Value Ref Range    Glucose 147 (H) 70 - 130 mg/dL   POC Glucose Once    Collection Time: 10/05/23 11:10 AM    Specimen: Blood   Result Value Ref Range    Glucose 237 (H) 70 - 130 mg/dL                               Physical Examination:        Physical Exam  Vitals and nursing note reviewed.   Constitutional:       Appearance: Normal appearance. He is well-developed. He is obese.   HENT:      Head: Normocephalic and atraumatic.      Right Ear: External ear normal.      Left Ear: External ear normal.      Nose: Nose normal.      Mouth/Throat:      Mouth: Mucous membranes are moist.      Pharynx: Oropharynx is clear.   Eyes:      Pupils: Pupils are equal, round, and reactive to light.   Cardiovascular:      Rate and Rhythm: Normal rate. Rhythm irregular.      Heart sounds:  Normal heart sounds.   Pulmonary:      Effort: Pulmonary effort is normal.      Breath sounds: Normal breath sounds.   Abdominal:      General: Bowel sounds are normal.      Palpations: Abdomen is soft.      Comments: obese   Musculoskeletal:         General: Normal range of motion.      Cervical back: Normal range of motion and neck supple.      Comments: Generalized weakness   Skin:     General: Skin is warm and dry.      Comments: Wound vac intact   Neurological:      Mental Status: He is alert and oriented to person, place, and time.      Deep Tendon Reflexes: Reflexes are normal and symmetric.   Psychiatric:         Behavior: Behavior normal.         Assessment:            * No active hospital problems. *    Past Medical History:   Diagnosis Date    Arthritis     Autonomic disease     CAD (coronary artery disease) 02/06/2017    Cervical radiculopathy 09/16/2021    Chronic constipation with acute exaccerbation 05/10/2021    Coronary artery disease     Degeneration of cervical intervertebral disc 08/11/2021    Diabetes mellitus     Diabetic foot ulcer 08/31/2020    Diabetic polyneuropathy associated with type 2 diabetes mellitus 01/18/2021    Elevated cholesterol     Gastroesophageal reflux disease 05/13/2019    Headache     HTN (hypertension), benign 05/03/2017    Hyperlipidemia     Hypertension     Mixed hyperlipidemia 02/07/2017    Multiple lung nodules 01/26/2020    5mm, 9 mm RLL identified 1/2020, not present 10/2019.    Myocardial infarction     Osteomyelitis 01/22/2020    Osteomyelitis of fifth toe of right foot 10/07/2019    Pancreatitis     Persistent insomnia 01/20/2020    Renal disorder     Sleep apnea 02/06/2017    Sleep apnea with use of continuous positive airway pressure (CPAP)     NON-COMPLIANT    Slow transit constipation 01/16/2019    Spinal stenosis in cervical region 09/16/2021    Vitamin D deficiency 03/02/2021        Plan:        Proteus wound infection s/p debridement  New onset atrial  fibrillation  Acute on chronic diastolic CHF  CKD3  Multifactorial anemia  DM2 with hyperglycemia on long term insulin  BRITTNI  Chronic pain syndrome  Chronic neck pain with radiculopathy  Diabetic peripheral neuropathy  Hx CAD  GERD  Possible gastroparesis  Sepsis secondary to wound infection  Continue current treatment. Monitor counts. Increase activity. Labs Monday. Wound care per wound care team. Continue IV antibiotics. Appreciate nephrology evaluation and treatment. Continue glycemic control efforts. Maintain patient safety. Continue pain control efforts. Appreciate cardiology evaluation and recommendations. Discharge planning.       Electronically signed by SUSAN Muñoz on 10/5/2023 at 12:19 CDT

## 2023-10-06 LAB
GLUCOSE BLDC GLUCOMTR-MCNC: 128 MG/DL (ref 70–130)
GLUCOSE BLDC GLUCOMTR-MCNC: 195 MG/DL (ref 70–130)
GLUCOSE BLDC GLUCOMTR-MCNC: 205 MG/DL (ref 70–130)
GLUCOSE BLDC GLUCOMTR-MCNC: 238 MG/DL (ref 70–130)

## 2023-10-06 PROCEDURE — 97110 THERAPEUTIC EXERCISES: CPT

## 2023-10-06 PROCEDURE — 25010000002 CEFTRIAXONE PER 250 MG: Performed by: INTERNAL MEDICINE

## 2023-10-06 PROCEDURE — 63710000001 INSULIN DETEMIR PER 5 UNITS: Performed by: INTERNAL MEDICINE

## 2023-10-06 PROCEDURE — 82948 REAGENT STRIP/BLOOD GLUCOSE: CPT

## 2023-10-06 PROCEDURE — 63710000001 INSULIN LISPRO (HUMAN) PER 5 UNITS: Performed by: INTERNAL MEDICINE

## 2023-10-06 PROCEDURE — 25010000002 HYDROMORPHONE 1 MG/ML SOLUTION: Performed by: INTERNAL MEDICINE

## 2023-10-06 PROCEDURE — 25010000002 ONDANSETRON PER 1 MG: Performed by: INTERNAL MEDICINE

## 2023-10-06 PROCEDURE — 97530 THERAPEUTIC ACTIVITIES: CPT

## 2023-10-06 RX ORDER — NITROGLYCERIN 0.4 MG/1
0.4 TABLET SUBLINGUAL
Status: DISCONTINUED | OUTPATIENT
Start: 2023-10-06 | End: 2023-10-11 | Stop reason: HOSPADM

## 2023-10-06 NOTE — PROGRESS NOTES
[facility]  NIMESH Parkinson APRN        Internal Medicine Progress Note    10/6/2023   09:24 CDT    Name:  Erick Luong  MRN:    [id number]     Acct:     [id number]   Room:  [address] [place] [address] Day: 0     Admit Date: 9/18/2023  6:30 PM  PCP: Del Shetty MD    Subjective:     C/C: weakness, fatigue    Interval History: Status:  improved. Up to chair. No family at bedside.  Afebrile. Blood sugars stable. No new concerns today. Less left foot pain today. Asking for nitroglycerin prn for chronic chest pain.  Discharge planning underway.     Review of Systems   Constitutional: Positive for malaise/fatigue. Negative for chills, decreased appetite, weight gain and weight loss.   HENT:  Negative for congestion, ear discharge, hoarse voice and tinnitus.    Eyes:  Negative for blurred vision, discharge, visual disturbance and visual halos.   Cardiovascular:  Negative for chest pain, claudication, dyspnea on exertion, irregular heartbeat, leg swelling, orthopnea and paroxysmal nocturnal dyspnea.   Respiratory:  Negative for cough, shortness of breath, sputum production and wheezing.    Endocrine: Negative for cold intolerance, heat intolerance and polyuria.   Hematologic/Lymphatic: Negative for adenopathy. Does not bruise/bleed easily.   Skin:  Positive for poor wound healing. Negative for dry skin, itching and suspicious lesions.   Musculoskeletal:  Positive for neck pain. Negative for arthritis, back pain, falls, joint pain, muscle weakness and myalgias.   Gastrointestinal:  Negative for abdominal pain, constipation, diarrhea, dysphagia and hematemesis.   Genitourinary:  Negative for bladder incontinence, dysuria and frequency.   Neurological:  Positive for weakness. Negative for aphonia, disturbances in coordination and dizziness.   Psychiatric/Behavioral:  Negative for altered mental status, depression, memory loss and substance abuse. The patient does not have insomnia and is not  "nervous/anxious.        Medications:     Allergies:   Allergies   Allergen Reactions    Cefepime Hives and Anaphylaxis    Bactrim [Sulfamethoxazole-Trimethoprim] Other (See Comments)     \"RENAL FAILURE\"    Vancomycin Itching    Zolpidem Unknown - High Severity     \"makes him crazy\"    Zolpidem Tartrate Unknown - Low Severity and Provider Review Needed    Metronidazole Rash       Current Meds:   Current Facility-Administered Medications:     acetaminophen (TYLENOL) tablet 650 mg, 650 mg, Oral, Q4H PRN **OR** acetaminophen (TYLENOL) suppository 650 mg, 650 mg, Rectal, Q4H PRN, Juan Manuel Page MD    aluminum-magnesium hydroxide-simethicone (MAALOX MAX) 400-400-40 MG/5ML suspension 30 mL, 30 mL, Oral, Q4H PRN, Juan Manuel Page MD    apixaban (ELIQUIS) tablet 5 mg, 5 mg, Oral, BID With Meals, Juan Manuel Page MD    bumetanide (BUMEX) tablet 2 mg, 2 mg, Oral, BID, Willy Arteaga MD    busPIRone (BUSPAR) tablet 10 mg, 10 mg, Oral, Q12H PRN, Juan Manuel Page MD    calcitriol (ROCALTROL) capsule 0.5 mcg, 0.5 mcg, Oral, Daily, Juan Manuel Page MD    carvedilol (COREG) tablet 3.125 mg, 3.125 mg, Oral, BID With Meals, Juan Manuel Page MD    cefTRIAXone (ROCEPHIN) 1,000 mg in sodium chloride 0.9 % 100 mL IVPB, 1,000 mg, Intravenous, Q24H, Juan Manuel Page MD    cholecalciferol (VITAMIN D3) tablet 5,000 Units, 5,000 Units, Oral, Daily, Juan Manuel Page MD    dextrose (D50W) (25 g/50 mL) IV injection 25 g, 25 g, Intravenous, Q15 Min PRN, Juan Manuel Page MD    dextrose (GLUTOSE) oral gel 15 g, 15 g, Oral, Q15 Min PRN, Juan Manuel Page MD    docusate sodium (COLACE) capsule 200 mg, 200 mg, Oral, BID, Juan Manuel Page MD    donepezil (ARICEPT) tablet 10 mg, 10 mg, Oral, Daily, Juan Manuel Page MD    Dulaglutide solution pen-injector 4.5 mg ** PATIENT-SUPPLIED MEDICATION **, 4.5 mg, Subcutaneous, Weekly, Juan Manuel Page MD    glucagon " (GLUCAGEN) injection 1 mg, 1 mg, Intramuscular, Q15 Min PRN, Juan Manuel Page MD    HYDROmorphone (DILAUDID) injection 1 mg, 1 mg, Intravenous, Q6H PRN, Juan Manuel Page MD    insulin detemir (LEVEMIR) injection 30 Units, 30 Units, Subcutaneous, Daily, Juan Manuel Page MD    Insulin Lispro (humaLOG) injection 2-9 Units, 2-9 Units, Subcutaneous, 4x Daily AC & at Bedtime, Juan Manuel Page MD    isosorbide mononitrate (IMDUR) 24 hr tablet 30 mg, 30 mg, Oral, Q24H, Joanie Mendes APRN    Lidocaine HCl 4 % gel gel 1 application , 1 application , Topical, PRN, Juan Manuel Page MD    magnesium hydroxide (MILK OF MAGNESIA) 400 MG/5ML suspension 15 mL, 15 mL, Oral, Daily PRN, Juan Manuel Page MD    melatonin tablet 6 mg, 6 mg, Oral, Nightly, Shruthi Isabel, SUSAN    metoclopramide (REGLAN) tablet 5 mg, 5 mg, Oral, TID AC, Juan Manuel Page MD    ondansetron (ZOFRAN) tablet 4 mg, 4 mg, Oral, Q6H PRN **OR** ondansetron (ZOFRAN) injection 4 mg, 4 mg, Intravenous, Q6H PRN, Juan Manuel Page MD    oxyCODONE (oxyCONTIN) 12 hr tablet 10 mg, 10 mg, Oral, Q12H, Juan Manuel Page MD    oxyCODONE (ROXICODONE) immediate release tablet 5 mg, 5 mg, Oral, Q4H PRN, Juan Manuel Paeg MD    pantoprazole (PROTONIX) EC tablet 40 mg, 40 mg, Oral, BID, Juan Manuel Page MD    polyethylene glycol (MIRALAX) packet 17 g, 17 g, Oral, Daily, Juan Manuel Page MD    pregabalin (LYRICA) capsule 100 mg, 100 mg, Oral, Daily, Juan Manuel Page MD    pregabalin (LYRICA) capsule 200 mg, 200 mg, Oral, Nightly, Juan Manuel Page MD    rosuvastatin (CRESTOR) tablet 40 mg, 40 mg, Oral, Nightly, Juan Manuel Page MD    saccharomyces boulardii (FLORASTOR) capsule 250 mg, 250 mg, Oral, BID, Juan Manuel Page MD    sennosides-docusate (PERICOLACE) 8.6-50 MG per tablet 1 tablet, 1 tablet, Oral, Nightly, Juan Manuel Page MD    sodium chloride 0.9 % flush 10 mL, 10 mL,  Intravenous, Q12H, Juan Manuel Page MD    sodium chloride 0.9 % flush 10 mL, 10 mL, Intravenous, PRN, Juan Manuel Page MD    sucralfate (CARAFATE) tablet 1 g, 1 g, Oral, TID AC, Juan Manuel Page MD    tamsulosin (FLOMAX) 24 hr capsule 0.4 mg, 0.4 mg, Oral, Daily, Juan Manuel Page MD    valsartan (DIOVAN) tablet 40 mg, 40 mg, Oral, Nightly, Juan Manuel Page MD    Data:     Code Status:    There are no questions and answers to display.       Family History   Problem Relation Age of Onset    Colon cancer Father     Heart disease Father     Colon cancer Sister     Colon polyps Sister     Alzheimer's disease Mother     Coronary artery disease Sister     Coronary artery disease Sister        Social History     Socioeconomic History    Marital status:    Tobacco Use    Smoking status: Former     Types: Cigarettes     Quit date:      Years since quittin.7    Smokeless tobacco: Never    Tobacco comments:     smoked in [employer]   Vaping Use    Vaping Use: Never used   Substance and Sexual Activity    Alcohol use: No    Drug use: No    Sexual activity: Defer       Vitals:  T 97.2 P 68 R 20 Bp 133/77 Sp02 98% (room air)            I/O (24Hr):  No intake or output data in the 24 hours ending 10/06/23 0924    Labs and imaging:      Recent Results (from the past 12 hour(s))   POC Glucose Once    Collection Time: 10/06/23  7:28 AM    Specimen: Blood   Result Value Ref Range    Glucose 195 (H) 70 - 130 mg/dL                               Physical Examination:        Physical Exam  Vitals and nursing note reviewed.   Constitutional:       Appearance: Normal appearance. He is well-developed. He is obese.   HENT:      Head: Normocephalic and atraumatic.      Right Ear: External ear normal.      Left Ear: External ear normal.      Nose: Nose normal.      Mouth/Throat:      Mouth: Mucous membranes are moist.      Pharynx: Oropharynx is clear.   Eyes:      Pupils: Pupils are equal, round,  and reactive to light.   Cardiovascular:      Rate and Rhythm: Normal rate. Rhythm irregular.      Heart sounds: Normal heart sounds.   Pulmonary:      Effort: Pulmonary effort is normal.      Breath sounds: Normal breath sounds.   Abdominal:      General: Bowel sounds are normal.      Palpations: Abdomen is soft.      Comments: obese   Musculoskeletal:         General: Normal range of motion.      Cervical back: Normal range of motion and neck supple.      Comments: Generalized weakness   Skin:     General: Skin is warm and dry.      Comments: Wound vac intact   Neurological:      Mental Status: He is alert and oriented to person, place, and time.      Deep Tendon Reflexes: Reflexes are normal and symmetric.   Psychiatric:         Behavior: Behavior normal.         Assessment:            * No active hospital problems. *    Past Medical History:   Diagnosis Date    Arthritis     Autonomic disease     CAD (coronary artery disease) 02/06/2017    Cervical radiculopathy 09/16/2021    Chronic constipation with acute exaccerbation 05/10/2021    Coronary artery disease     Degeneration of cervical intervertebral disc 08/11/2021    Diabetes mellitus     Diabetic foot ulcer 08/31/2020    Diabetic polyneuropathy associated with type 2 diabetes mellitus 01/18/2021    Elevated cholesterol     Gastroesophageal reflux disease 05/13/2019    Headache     HTN (hypertension), benign 05/03/2017    Hyperlipidemia     Hypertension     Mixed hyperlipidemia 02/07/2017    Multiple lung nodules 01/26/2020    5mm, 9 mm RLL identified 1/2020, not present 10/2019.    Myocardial infarction     Osteomyelitis 01/22/2020    Osteomyelitis of fifth toe of right foot 10/07/2019    Pancreatitis     Persistent insomnia 01/20/2020    Renal disorder     Sleep apnea 02/06/2017    Sleep apnea with use of continuous positive airway pressure (CPAP)     NON-COMPLIANT    Slow transit constipation 01/16/2019    Spinal stenosis in cervical region 09/16/2021     Vitamin D deficiency 03/02/2021        Plan:        Proteus wound infection s/p debridement  New onset atrial fibrillation  Acute on chronic diastolic CHF  CKD3  Multifactorial anemia  DM2 with hyperglycemia on long term insulin  BRITTNI  Chronic pain syndrome  Chronic neck pain with radiculopathy  Diabetic peripheral neuropathy  Hx CAD  GERD  Possible gastroparesis  Sepsis secondary to wound infection  Continue current treatment. Monitor counts. Increase activity. Labs Monday. Wound care per wound care team. Continue IV antibiotics. Appreciate nephrology evaluation and treatment. Continue glycemic control efforts. Maintain patient safety. Continue pain control efforts. Appreciate cardiology evaluation and recommendations. Discharge planning.       Electronically signed by SUSAN Muñoz on 10/6/2023 at 09:24 CDT     I have discussed the care of Erick Luong, including pertinent history and exam findings, with the nurse practitioner.    I have seen and examined the patient and the key elements of all parts of the encounter have been performed by me.  I agree with the assessment, plan and orders as documented by SUSAN Haney, after I modified the exam findings and the plan of treatments and the final version is my approved version of the assessment.        Electronically signed by Juan Manuel Page MD on 10/6/2023 at 12:09 CDT

## 2023-10-06 NOTE — PROGRESS NOTES
Nephrology ([facility]) Progress Note      Patient:  Erick Luong  YOB: 1955  Date of Service: 10/6/2023  MRN: [id number]   Acct: [id number]   Primary Care Physician: Del Shetty MD  Advance Directive:   There are no questions and answers to display.     Admit Date: 9/18/2023       Hospital Day: 0  Referring Provider: Juan Manuel Page MD      Patient personally seen and examined.  Complete chart including Consults, Notes, Operative Reports, Labs, Cardiology, and Radiology studies reviewed as able.        Subjective:  Erick Luong is a 68 y.o. male for whom we were consulted for evaluation and treatment of chronic kidney disease stage IIIb.  He has stage IIIb chronic kidney disease baseline and follows with Dr. Lomas in the office as he has diabetic nephropathy.  He has a history of insulin-dependent type 2 diabetes, hypertension, abdominal obesity, obstructive sleep apnea, diabetic foot ulcer and coronary artery disease.  He was accepted as a transfer from [facility].  Patient was admitted at [facility] by Dr. Gomes and extensive debridement of left foot wound at plantar aspect.  Postoperative wound measuring 6 x 17 x 4 cm with exposed bone.  Surgical culture returned positive for Proteus Mirabills.  Patient needed long-term IV antibiotics.  He is now admitted for wound care, long-term IV antibiotics, chronic kidney disease and treatment of diastolic congestive heart failure.  Patient also has history of gastroparesis.    Today, he offered no new complaints. He continues to have severe left foot pain.  It sometimes affects his sleep.      Allergies:  Cefepime, Bactrim [sulfamethoxazole-trimethoprim], Vancomycin, Zolpidem, Zolpidem tartrate, and Metronidazole    Home Meds:  Medications Prior to Admission   Medication Sig Dispense Refill Last Dose    amitriptyline (ELAVIL) 25 MG tablet Take 2 tablets by mouth Daily at bedtime. 60 tablet 1      amoxicillin-clavulanate (Augmentin) 500-125 MG per tablet Take 1 tablet by mouth every 12 hours with meals for 7 days. 14 tablet 0     ascorbic acid (VITAMIN C) 1000 MG tablet Take 1 tablet by mouth Daily. 30 tablet 3     Aspirin 81 MG capsule Take 81 mg by mouth Daily.       bumetanide (BUMEX) 2 MG tablet Take 1 tablet by mouth Daily. Take 2 tablets in morning;1 tablet at night       busPIRone (BUSPAR) 10 MG tablet Take 1 tablet by mouth 2 (Two) Times a Day As Needed. 100 tablet 3     calcitriol (ROCALTROL) 0.5 MCG capsule Take 1 capsule by mouth Daily. 90 capsule 4     carvedilol (COREG) 6.25 MG tablet Take 1 tablet by mouth 2 (Two) Times a Day. 180 tablet 4     Cholecalciferol (D-5000) 125 MCG (5000 UT) tablet Take 1 tablet by mouth Daily Before Lunch. 30 tablet 2     cloNIDine (CATAPRES) 0.1 MG tablet Take 1 tablet by mouth Every 12 (Twelve) Hours. 60 tablet 2     Diclofenac Sodium (VOLTAREN) 1 % gel gel Apply 2 g topically to the appropriate area as directed 4 (Four) Times a Day As Needed. 300 g 11     donepezil (ARICEPT) 10 MG tablet Take 1 tablet by mouth Daily. 90 tablet 4     Dulaglutide (Trulicity) 4.5 MG/0.5ML solution pen-injector Inject 0.5 mL under the skin into the appropriate area as directed 1 (One) Time Per Week. 2 mL 11     empagliflozin (JARDIANCE) 25 MG tablet tablet Take 1 tablet by mouth Daily. 30 tablet 2     HYDROcodone-acetaminophen (NORCO) 7.5-325 MG per tablet Take 1 tablet by mouth 2 (Two) Times a Day As Needed. 40 tablet 0     Insulin Regular Human, Conc, (HumuLIN R U-500 KwikPen) 500 UNIT/ML solution pen-injector CONCENTRATED injection Inject 120 Units under the skin into the appropriate area as directed 2 (Two) Times a Day with breakfast and dinner. (Patient taking differently: Inject 120 Units under the skin into the appropriate area as directed 3 (Three) Times a Day Before Meals.) 18 mL 5     midodrine (PROAMATINE) 10 MG tablet Take 1 tablet by mouth 3 (Three) Times a Day. 270  tablet 4     ondansetron ODT (ZOFRAN-ODT) 8 MG disintegrating tablet Place 1 tablet under tongue 3 times a day as needed. 25 tablet 1     pantoprazole (Protonix) 40 MG EC tablet Take 1 tablet by mouth 2 (Two) Times a Day. 180 tablet 4     polyethylene glycol (MIRALAX) 17 g packet Take 17 g by mouth Daily. Obtain OTC       pregabalin (LYRICA) 100 MG capsule Take 1 capsule by mouth Daily With Breakfast AND 2 capsules Every Night. 90 capsule 2     rosuvastatin (CRESTOR) 40 MG tablet Take 1 tablet by mouth Every Night. 90 tablet 3     sennosides-docusate (PERICOLACE) 8.6-50 MG per tablet Take 1 tablet by mouth Every Night. Obtain OTC       sennosides-docusate (PERICOLACE) 8.6-50 MG per tablet Take 1 tablet by mouth every night at bedtime. 30 tablet 2     sodium hypochlorite (DAKIN'S 1/4 STRENGTH) 0.125 % solution topical solution 0.125% Apply to affected area twice daily 473 mL 3     tamsulosin (FLOMAX) 0.4 MG capsule 24 hr capsule Take 1 capsule by mouth Daily. 90 capsule 1        Medicines:  Current Facility-Administered Medications   Medication Dose Route Frequency Provider Last Rate Last Admin    acetaminophen (TYLENOL) tablet 650 mg  650 mg Oral Q4H PRN Juan Manuel Page MD        Or    acetaminophen (TYLENOL) suppository 650 mg  650 mg Rectal Q4H PRN Juan Manuel Page MD        aluminum-magnesium hydroxide-simethicone (MAALOX MAX) 400-400-40 MG/5ML suspension 30 mL  30 mL Oral Q4H PRN Juan Manuel Page MD        apixaban (ELIQUIS) tablet 5 mg  5 mg Oral BID With Meals Juan Manuel Page MD        bumetanide (BUMEX) tablet 2 mg  2 mg Oral BID Willy Arteaga MD        busPIRone (BUSPAR) tablet 10 mg  10 mg Oral Q12H PRN Juan Manuel Page MD        calcitriol (ROCALTROL) capsule 0.5 mcg  0.5 mcg Oral Daily Juan Manuel Page MD        carvedilol (COREG) tablet 3.125 mg  3.125 mg Oral BID With Meals Juan Manuel Page MD        cefTRIAXone (ROCEPHIN) 1,000 mg in sodium  chloride 0.9 % 100 mL IVPB  1,000 mg Intravenous Q24H Juan Manuel Page MD        cholecalciferol (VITAMIN D3) tablet 5,000 Units  5,000 Units Oral Daily Juan Manuel Page MD        dextrose (D50W) (25 g/50 mL) IV injection 25 g  25 g Intravenous Q15 Min PRN Juan Manuel Page MD        dextrose (GLUTOSE) oral gel 15 g  15 g Oral Q15 Min PRN Juan Manuel Page MD        docusate sodium (COLACE) capsule 200 mg  200 mg Oral BID Juan Manuel Page MD        donepezil (ARICEPT) tablet 10 mg  10 mg Oral Daily Juan Manuel Page MD        Dulaglutide solution pen-injector 4.5 mg ** PATIENT-SUPPLIED MEDICATION **  4.5 mg Subcutaneous Weekly Juan Manuel Page MD        glucagon (GLUCAGEN) injection 1 mg  1 mg Intramuscular Q15 Min PRN Juan Manuel Page MD        HYDROmorphone (DILAUDID) injection 1 mg  1 mg Intravenous Q6H PRN Juan Manuel Page MD        insulin detemir (LEVEMIR) injection 30 Units  30 Units Subcutaneous Daily Juan Manuel Page MD        Insulin Lispro (humaLOG) injection 2-9 Units  2-9 Units Subcutaneous 4x Daily AC & at Bedtime Juan Manuel Page MD        isosorbide mononitrate (IMDUR) 24 hr tablet 30 mg  30 mg Oral Q24H Joanie Mendes APRN        Lidocaine HCl 4 % gel gel 1 application   1 application  Topical PRN Juan Manuel Page MD        magnesium hydroxide (MILK OF MAGNESIA) 400 MG/5ML suspension 15 mL  15 mL Oral Daily PRN Juan Manuel Page MD        melatonin tablet 6 mg  6 mg Oral Nightly Shruthi Isabel APRN        metoclopramide (REGLAN) tablet 5 mg  5 mg Oral TID AC Juan Manuel Page MD        nitroglycerin (NITROSTAT) SL tablet 0.4 mg  0.4 mg Sublingual Q5 Min PRN Shruthi Isabel APRN        ondansetron (ZOFRAN) tablet 4 mg  4 mg Oral Q6H PRN Juan Manuel Page MD        Or    ondansetron (ZOFRAN) injection 4 mg  4 mg Intravenous Q6H PRN Juan Manuel Page MD        oxyCODONE (oxyCONTIN) 12 hr tablet 10 mg   10 mg Oral Q12H Juan Manuel Page MD        oxyCODONE (ROXICODONE) immediate release tablet 5 mg  5 mg Oral Q4H PRN Juan Manuel Page MD        pantoprazole (PROTONIX) EC tablet 40 mg  40 mg Oral BID Juan Manuel Page MD        polyethylene glycol (MIRALAX) packet 17 g  17 g Oral Daily Juan Manuel Page MD        pregabalin (LYRICA) capsule 100 mg  100 mg Oral Daily Juan Manuel Page MD        pregabalin (LYRICA) capsule 200 mg  200 mg Oral Nightly Juan Manuel Page MD        rosuvastatin (CRESTOR) tablet 40 mg  40 mg Oral Nightly Juan Manuel Page MD        saccharomyces boulardii (FLORASTOR) capsule 250 mg  250 mg Oral BID Juan Manuel Page MD        sennosides-docusate (PERICOLACE) 8.6-50 MG per tablet 1 tablet  1 tablet Oral Nightly Juan Manuel Page MD        sodium chloride 0.9 % flush 10 mL  10 mL Intravenous Q12H Juan Manuel Page MD        sodium chloride 0.9 % flush 10 mL  10 mL Intravenous PRN Juan Manuel Page MD        sucralfate (CARAFATE) tablet 1 g  1 g Oral TID AC Juan Manuel Page MD        tamsulosin (FLOMAX) 24 hr capsule 0.4 mg  0.4 mg Oral Daily Juan Manuel Page MD        valsartan (DIOVAN) tablet 40 mg  40 mg Oral Nightly Juan Manuel Page MD           Past Medical History:  Past Medical History:   Diagnosis Date    Arthritis     Autonomic disease     CAD (coronary artery disease) 02/06/2017    Cervical radiculopathy 09/16/2021    Chronic constipation with acute exaccerbation 05/10/2021    Coronary artery disease     Degeneration of cervical intervertebral disc 08/11/2021    Diabetes mellitus     Diabetic foot ulcer 08/31/2020    Diabetic polyneuropathy associated with type 2 diabetes mellitus 01/18/2021    Elevated cholesterol     Gastroesophageal reflux disease 05/13/2019    Headache     HTN (hypertension), benign 05/03/2017    Hyperlipidemia     Hypertension     Mixed hyperlipidemia 02/07/2017    Multiple lung nodules  01/26/2020    5mm, 9 mm RLL identified 1/2020, not present 10/2019.    Myocardial infarction     Osteomyelitis 01/22/2020    Osteomyelitis of fifth toe of right foot 10/07/2019    Pancreatitis     Persistent insomnia 01/20/2020    Renal disorder     Sleep apnea 02/06/2017    Sleep apnea with use of continuous positive airway pressure (CPAP)     NON-COMPLIANT    Slow transit constipation 01/16/2019    Spinal stenosis in cervical region 09/16/2021    Vitamin D deficiency 03/02/2021       Past Surgical History:  Past Surgical History:   Procedure Laterality Date    ABDOMINAL SURGERY      AMPUTATION FOOT / TOE Left 10/2021    5th digit     ANTERIOR CERVICAL DISCECTOMY W/ FUSION N/A 8/5/2022    Procedure: CERVICAL DISCECTOMY ANTERIOR WITH FUSION C5-6 with possible plating of C5-7 with neuromonitoring and 1 c-arm;  Surgeon: Karel Soliz MD;  Location:  [facility] OR;  Service: Neurosurgery;  Laterality: N/A;    APPENDECTOMY      BACK SURGERY      CARDIAC CATHETERIZATION Left 02/08/2021    Procedure: Left Heart Cath w poss intervention left anatomical snuff box acess;  Surgeon: Omkar Charles DO;  Location:  [facility] CATH INVASIVE LOCATION;  Service: Cardiology;  Laterality: Left;    CARDIAC SURGERY      CATARACT EXTRACTION      CERVICAL SPINE SURGERY      COLONOSCOPY N/A 01/31/2017    Normal exam repeat in 5 years    COLONOSCOPY N/A 02/11/2019    Mild acute inflammation    COLONOSCOPY W/ POLYPECTOMY  03/04/2014    Hyperplastic polyp    CORONARY ARTERY BYPASS GRAFT  10/2015    ENDOSCOPY  04/13/2011    Gastritis with hemorrhage    ENDOSCOPY N/A 05/05/2017    Normal exam    ENDOSCOPY N/A 02/11/2019    Gastritis    ENDOSCOPY N/A 09/01/2020    Non-erosive gastritis with hemorrhage    ENDOSCOPY N/A 02/10/2021    Esophagitis    FOOT SURGERY Left     INCISION AND DRAINAGE OF WOUND Left 09/2007    spider bite       Family History  Family History   Problem Relation Age of Onset    Colon cancer Father     Heart disease  Father     Colon cancer Sister     Colon polyps Sister     Alzheimer's disease Mother     Coronary artery disease Sister     Coronary artery disease Sister        Social History  Social History     Socioeconomic History    Marital status:    Tobacco Use    Smoking status: Former     Types: Cigarettes     Quit date:      Years since quittin.7    Smokeless tobacco: Never    Tobacco comments:     smoked in highschool   Vaping Use    Vaping Use: Never used   Substance and Sexual Activity    Alcohol use: No    Drug use: No    Sexual activity: Defer       Review of Systems:  History obtained from chart review and the patient  General ROS: No fever or chills  Respiratory ROS: No cough, shortness of breath, wheezing  Cardiovascular ROS: No chest pain or palpitations  Gastrointestinal ROS: No abdominal pain or melena  Genito-Urinary ROS: No dysuria or hematuria  Psych ROS: No anxiety and depression  14 point ROS reviewed with the patient and negative except as noted above and in the HPI unless unable to obtain.    Objective:  Vitals: BP-133/77  HR-68  temp-97.2  General: awake/alert   Chest:  clear to auscultation bilaterally without respiratory distress  CVS: regular rate and rhythm  Abdominal: soft, nontender, positive bowel sounds  Extremities: leg edema, left foot attached to wound VAC    Skin: lle wound vac, warm/dry      Labs:  Results from last 7 days   Lab Units 10/05/23  0502 10/02/23  0440   WBC 10*3/mm3 6.98 6.52   HEMOGLOBIN g/dL 8.8* 9.0*   HEMATOCRIT % 29.3* 29.4*   PLATELETS 10*3/mm3 244 212           Results from last 7 days   Lab Units 10/05/23  0502 10/04/23  0610 10/02/23  0440   SODIUM mmol/L 140 137 137   POTASSIUM mmol/L 4.2 4.7 5.2   CHLORIDE mmol/L 100 102 102   CO2 mmol/L 28.0 21.0* 25.0   BUN mg/dL 42* 40* 31*   CREATININE mg/dL 2.55* 2.54* 2.12*   CALCIUM mg/dL 9.4 9.0 9.1   EGFR mL/min/1.73 26.8* 27.0* 33.5*   GLUCOSE mg/dL 78 78 213*         Radiology:   Imaging Results (Last 72  Hours)       ** No results found for the last 72 hours. **            Culture:  No results found for: BLOODCX, URINECX, WOUNDCX, MRSACX, RESPCX, STOOLCX      Assessment   Acute kidney injury/ATN  Chronic kidney disease stage IIIb  Diabetes type 2 with diabetic nephropathy  Diabetic foot ulcer with surgical debridement and wound VAC  Chronic diastolic congestive heart failure  Wound infection with Proteus  Gastroparesis  Secondary hyperparathyroidism  Anemia and chronic kidney disease  Chest Pain      Plan:  Discussed with patient  Work-up reviewed today  Monitor labs   Calcitriol  Bumex at 2 mg bid since renal function is about stable.    Vinny Hartley MD  10/6/2023  12:44 CDT

## 2023-10-07 ENCOUNTER — APPOINTMENT (OUTPATIENT)
Dept: GENERAL RADIOLOGY | Facility: HOSPITAL | Age: 68
End: 2023-10-07
Payer: COMMERCIAL

## 2023-10-07 LAB
GLUCOSE BLDC GLUCOMTR-MCNC: 138 MG/DL (ref 70–130)
GLUCOSE BLDC GLUCOMTR-MCNC: 240 MG/DL (ref 70–130)
GLUCOSE BLDC GLUCOMTR-MCNC: 61 MG/DL (ref 70–130)
GLUCOSE BLDC GLUCOMTR-MCNC: 72 MG/DL (ref 70–130)

## 2023-10-07 PROCEDURE — 25010000002 PROMETHAZINE PER 50 MG: Performed by: INTERNAL MEDICINE

## 2023-10-07 PROCEDURE — 63710000001 INSULIN LISPRO (HUMAN) PER 5 UNITS: Performed by: INTERNAL MEDICINE

## 2023-10-07 PROCEDURE — 63710000001 INSULIN DETEMIR PER 5 UNITS: Performed by: INTERNAL MEDICINE

## 2023-10-07 PROCEDURE — 25010000002 ONDANSETRON PER 1 MG: Performed by: INTERNAL MEDICINE

## 2023-10-07 PROCEDURE — 74018 RADEX ABDOMEN 1 VIEW: CPT

## 2023-10-07 PROCEDURE — 25010000002 HYDROMORPHONE 1 MG/ML SOLUTION: Performed by: INTERNAL MEDICINE

## 2023-10-07 PROCEDURE — 82948 REAGENT STRIP/BLOOD GLUCOSE: CPT

## 2023-10-07 PROCEDURE — 97110 THERAPEUTIC EXERCISES: CPT

## 2023-10-07 PROCEDURE — 25010000002 CEFTRIAXONE PER 250 MG: Performed by: INTERNAL MEDICINE

## 2023-10-07 PROCEDURE — 97530 THERAPEUTIC ACTIVITIES: CPT

## 2023-10-07 NOTE — PROGRESS NOTES
NIMESH Cadena APRN        Internal Medicine Progress Note    10/7/2023   15:00 CDT    Name:  Erick Luong  MRN:    [id number]     Acct:     [id number]   Room:  [address] [place] [address] Day: 0     Admit Date: 9/18/2023  6:30 PM  PCP: Del Shetty MD    Subjective:     C/C: weakness, fatigue    Interval History: Status:  improved. Up to chair. No family at bedside.  Afebrile. Blood sugars stable. No new concerns today. Pain well controlled at present time.  Discharge planning underway.     Review of Systems   Constitutional: Positive for malaise/fatigue. Negative for chills, decreased appetite, weight gain and weight loss.   HENT:  Negative for congestion, ear discharge, hoarse voice and tinnitus.    Eyes:  Negative for blurred vision, discharge, visual disturbance and visual halos.   Cardiovascular:  Negative for chest pain, claudication, dyspnea on exertion, irregular heartbeat, leg swelling, orthopnea and paroxysmal nocturnal dyspnea.   Respiratory:  Negative for cough, shortness of breath, sputum production and wheezing.    Endocrine: Negative for cold intolerance, heat intolerance and polyuria.   Hematologic/Lymphatic: Negative for adenopathy. Does not bruise/bleed easily.   Skin:  Positive for poor wound healing. Negative for dry skin, itching and suspicious lesions.   Musculoskeletal:  Positive for neck pain. Negative for arthritis, back pain, falls, joint pain, muscle weakness and myalgias.   Gastrointestinal:  Negative for abdominal pain, constipation, diarrhea, dysphagia and hematemesis.   Genitourinary:  Negative for bladder incontinence, dysuria and frequency.   Neurological:  Positive for weakness. Negative for aphonia, disturbances in coordination and dizziness.   Psychiatric/Behavioral:  Negative for altered mental status, depression, memory loss and substance abuse. The patient does not have insomnia and is not nervous/anxious.          Medications:     Allergies:  "  Allergies   Allergen Reactions    Cefepime Hives and Anaphylaxis    Bactrim [Sulfamethoxazole-Trimethoprim] Other (See Comments)     \"RENAL FAILURE\"    Vancomycin Itching    Zolpidem Unknown - High Severity     \"makes him crazy\"    Zolpidem Tartrate Unknown - Low Severity and Provider Review Needed    Metronidazole Rash       Current Meds:   Current Facility-Administered Medications:     acetaminophen (TYLENOL) tablet 650 mg, 650 mg, Oral, Q4H PRN **OR** acetaminophen (TYLENOL) suppository 650 mg, 650 mg, Rectal, Q4H PRN, Juan Manuel Page MD    aluminum-magnesium hydroxide-simethicone (MAALOX MAX) 400-400-40 MG/5ML suspension 30 mL, 30 mL, Oral, Q4H PRN, Juan Manuel Page MD    apixaban (ELIQUIS) tablet 5 mg, 5 mg, Oral, BID With Meals, Juan Manuel Page MD    bumetanide (BUMEX) tablet 2 mg, 2 mg, Oral, BID, Willy Arteaga MD    busPIRone (BUSPAR) tablet 10 mg, 10 mg, Oral, Q12H PRN, Juan Manuel Page MD    calcitriol (ROCALTROL) capsule 0.5 mcg, 0.5 mcg, Oral, Daily, Juan Manuel Page MD    carvedilol (COREG) tablet 3.125 mg, 3.125 mg, Oral, BID With Meals, Juan Manuel Page MD    cefTRIAXone (ROCEPHIN) 1,000 mg in sodium chloride 0.9 % 100 mL IVPB, 1,000 mg, Intravenous, Q24H, Juan Manuel Page MD    cholecalciferol (VITAMIN D3) tablet 5,000 Units, 5,000 Units, Oral, Daily, Juan Manuel Page MD    dextrose (D50W) (25 g/50 mL) IV injection 25 g, 25 g, Intravenous, Q15 Min PRN, Juan Manuel Page MD    dextrose (GLUTOSE) oral gel 15 g, 15 g, Oral, Q15 Min PRN, Juan Manuel Page MD    docusate sodium (COLACE) capsule 200 mg, 200 mg, Oral, BID, Juan Manuel Page MD    donepezil (ARICEPT) tablet 10 mg, 10 mg, Oral, Daily, Juan Manuel Page MD    Dulaglutide solution pen-injector 4.5 mg ** PATIENT-SUPPLIED MEDICATION **, 4.5 mg, Subcutaneous, Weekly, Juan Manuel Page MD    glucagon (GLUCAGEN) injection 1 mg, 1 mg, Intramuscular, Q15 Min PRN, " Juan Manuel Page MD    HYDROmorphone (DILAUDID) injection 1 mg, 1 mg, Intravenous, Q6H PRN, Juan Manuel Page MD    insulin detemir (LEVEMIR) injection 30 Units, 30 Units, Subcutaneous, Daily, Juan Manuel Page MD    Insulin Lispro (humaLOG) injection 2-9 Units, 2-9 Units, Subcutaneous, 4x Daily AC & at Bedtime, Juan Manuel Page MD    isosorbide mononitrate (IMDUR) 24 hr tablet 30 mg, 30 mg, Oral, Q24H, Joanie Mendes APRN    Lidocaine HCl 4 % gel gel 1 application , 1 application , Topical, PRN, Juan Manuel Page MD    magnesium hydroxide (MILK OF MAGNESIA) 400 MG/5ML suspension 15 mL, 15 mL, Oral, Daily PRN, Juan Manuel Page MD    melatonin tablet 6 mg, 6 mg, Oral, Nightly, Shruthi Isabel, SUSAN    metoclopramide (REGLAN) tablet 5 mg, 5 mg, Oral, TID AC, Juan Manuel Page MD    nitroglycerin (NITROSTAT) SL tablet 0.4 mg, 0.4 mg, Sublingual, Q5 Min PRN, Shruthi Isabel APRN    ondansetron (ZOFRAN) tablet 4 mg, 4 mg, Oral, Q6H PRN **OR** ondansetron (ZOFRAN) injection 4 mg, 4 mg, Intravenous, Q6H PRN, Juan Manuel Page MD    oxyCODONE (oxyCONTIN) 12 hr tablet 10 mg, 10 mg, Oral, Q12H, Juan Manuel Page MD    oxyCODONE (ROXICODONE) immediate release tablet 5 mg, 5 mg, Oral, Q4H PRN, Juan Manuel Page MD    pantoprazole (PROTONIX) EC tablet 40 mg, 40 mg, Oral, BID, Juan Manuel Page MD    polyethylene glycol (MIRALAX) packet 17 g, 17 g, Oral, Daily, Juan Manuel Page MD    pregabalin (LYRICA) capsule 100 mg, 100 mg, Oral, Daily, Juan Manuel Page MD    pregabalin (LYRICA) capsule 200 mg, 200 mg, Oral, Nightly, Juan Manuel Page MD    rosuvastatin (CRESTOR) tablet 40 mg, 40 mg, Oral, Nightly, Juan Manuel Page MD    saccharomyces boulardii (FLORASTOR) capsule 250 mg, 250 mg, Oral, BID, Juan Manuel Page MD    sennosides-docusate (PERICOLACE) 8.6-50 MG per tablet 1 tablet, 1 tablet, Oral, Nightly, Juan Manuel Page MD     sodium chloride 0.9 % flush 10 mL, 10 mL, Intravenous, Q12H, Juan Manuel Page MD    sodium chloride 0.9 % flush 10 mL, 10 mL, Intravenous, PRN, Juan Manuel Page MD    sucralfate (CARAFATE) tablet 1 g, 1 g, Oral, TID AC, Juan Manuel Page MD    tamsulosin (FLOMAX) 24 hr capsule 0.4 mg, 0.4 mg, Oral, Daily, Juan Manuel Page MD    valsartan (DIOVAN) tablet 40 mg, 40 mg, Oral, Nightly, Juan Manuel Page MD    Data:     Code Status:    There are no questions and answers to display.       Family History   Problem Relation Age of Onset    Colon cancer Father     Heart disease Father     Colon cancer Sister     Colon polyps Sister     Alzheimer's disease Mother     Coronary artery disease Sister     Coronary artery disease Sister        Social History     Socioeconomic History    Marital status:    Tobacco Use    Smoking status: Former     Types: Cigarettes     Quit date:      Years since quittin.7    Smokeless tobacco: Never    Tobacco comments:     smoked in [employer]   Vaping Use    Vaping Use: Never used   Substance and Sexual Activity    Alcohol use: No    Drug use: No    Sexual activity: Defer       Vitals:  T 98.6 P 80 R 20 Bp 118/53 Sp02 99% (room air)            I/O (24Hr):  No intake or output data in the 24 hours ending 10/07/23 1500    Labs and imaging:      Recent Results (from the past 12 hour(s))   POC Glucose Once    Collection Time: 10/07/23  7:19 AM    Specimen: Blood   Result Value Ref Range    Glucose 138 (H) 70 - 130 mg/dL   POC Glucose Once    Collection Time: 10/07/23 11:32 AM    Specimen: Blood   Result Value Ref Range    Glucose 240 (H) 70 - 130 mg/dL                               Physical Examination:        Physical Exam  Vitals and nursing note reviewed.   Constitutional:       Appearance: Normal appearance. He is well-developed. He is obese.   HENT:      Head: Normocephalic and atraumatic.      Right Ear: External ear normal.      Left Ear: External  ear normal.      Nose: Nose normal.      Mouth/Throat:      Mouth: Mucous membranes are moist.      Pharynx: Oropharynx is clear.   Eyes:      Pupils: Pupils are equal, round, and reactive to light.   Cardiovascular:      Rate and Rhythm: Normal rate. Rhythm irregular.      Heart sounds: Normal heart sounds.   Pulmonary:      Effort: Pulmonary effort is normal.      Breath sounds: Normal breath sounds.   Abdominal:      General: Bowel sounds are normal.      Palpations: Abdomen is soft.      Comments: obese   Musculoskeletal:         General: Normal range of motion.      Cervical back: Normal range of motion and neck supple.      Comments: Generalized weakness   Skin:     General: Skin is warm and dry.      Comments: Wound vac intact   Neurological:      Mental Status: He is alert and oriented to person, place, and time.      Deep Tendon Reflexes: Reflexes are normal and symmetric.   Psychiatric:         Behavior: Behavior normal.           Assessment:            * No active hospital problems. *    Past Medical History:   Diagnosis Date    Arthritis     Autonomic disease     CAD (coronary artery disease) 02/06/2017    Cervical radiculopathy 09/16/2021    Chronic constipation with acute exaccerbation 05/10/2021    Coronary artery disease     Degeneration of cervical intervertebral disc 08/11/2021    Diabetes mellitus     Diabetic foot ulcer 08/31/2020    Diabetic polyneuropathy associated with type 2 diabetes mellitus 01/18/2021    Elevated cholesterol     Gastroesophageal reflux disease 05/13/2019    Headache     HTN (hypertension), benign 05/03/2017    Hyperlipidemia     Hypertension     Mixed hyperlipidemia 02/07/2017    Multiple lung nodules 01/26/2020    5mm, 9 mm RLL identified 1/2020, not present 10/2019.    Myocardial infarction     Osteomyelitis 01/22/2020    Osteomyelitis of fifth toe of right foot 10/07/2019    Pancreatitis     Persistent insomnia 01/20/2020    Renal disorder     Sleep apnea 02/06/2017     Sleep apnea with use of continuous positive airway pressure (CPAP)     NON-COMPLIANT    Slow transit constipation 01/16/2019    Spinal stenosis in cervical region 09/16/2021    Vitamin D deficiency 03/02/2021        Plan:        Proteus wound infection s/p debridement  New onset atrial fibrillation  Acute on chronic diastolic CHF  CKD3  Multifactorial anemia  DM2 with hyperglycemia on long term insulin  BRITTNI  Chronic pain syndrome  Chronic neck pain with radiculopathy  Diabetic peripheral neuropathy  Hx CAD  GERD  Possible gastroparesis  Sepsis secondary to wound infection  Continue current treatment. Monitor counts. Increase activity. Labs Monday. Wound care per wound care team. Continue IV antibiotics. Appreciate nephrology evaluation and treatment. Continue glycemic control efforts. Maintain patient safety. Continue pain control efforts. Appreciate cardiology evaluation and recommendations. Discharge planning.       Electronically signed by SUSAN Muñoz on 10/7/2023 at 15:00 CDT

## 2023-10-07 NOTE — PROGRESS NOTES
Nephrology ([facility]) Progress Note      Patient:  Erick Luong  YOB: 1955  Date of Service: 10/7/2023  MRN: [id number]   Acct: [id number]   Primary Care Physician: Del Shetty MD  Advance Directive:   There are no questions and answers to display.     Admit Date: 9/18/2023       Hospital Day: 0  Referring Provider: Juan Manuel Page MD      Patient personally seen and examined.  Complete chart including Consults, Notes, Operative Reports, Labs, Cardiology, and Radiology studies reviewed as able.        Subjective:  Erick Luong is a 68 y.o. male for whom we were consulted for evaluation and treatment of chronic kidney disease stage IIIb.  He has stage IIIb chronic kidney disease baseline and follows with Dr. Lomas in the office as he has diabetic nephropathy.  He has a history of insulin-dependent type 2 diabetes, hypertension, abdominal obesity, obstructive sleep apnea, diabetic foot ulcer and coronary artery disease.  He was accepted as a transfer from [facility].  Patient was admitted at [facility] by Dr. Gomes and extensive debridement of left foot wound at plantar aspect.  Postoperative wound measuring 6 x 17 x 4 cm with exposed bone.  Surgical culture returned positive for Proteus Mirabills.  Patient needed long-term IV antibiotics.  He is now admitted for wound care, long-term IV antibiotics, chronic kidney disease and treatment of diastolic congestive heart failure.  Patient also has history of gastroparesis.    Today, he offers no new complaints. He continues to have severe left foot pain.  He is not ambulatory. No changes in urine habits.       Allergies:  Cefepime, Bactrim [sulfamethoxazole-trimethoprim], Vancomycin, Zolpidem, Zolpidem tartrate, and Metronidazole    Home Meds:  Medications Prior to Admission   Medication Sig Dispense Refill Last Dose    amitriptyline (ELAVIL) 25 MG tablet Take 2 tablets by mouth Daily at bedtime. 60  tablet 1     amoxicillin-clavulanate (Augmentin) 500-125 MG per tablet Take 1 tablet by mouth every 12 hours with meals for 7 days. 14 tablet 0     ascorbic acid (VITAMIN C) 1000 MG tablet Take 1 tablet by mouth Daily. 30 tablet 3     Aspirin 81 MG capsule Take 81 mg by mouth Daily.       bumetanide (BUMEX) 2 MG tablet Take 1 tablet by mouth Daily. Take 2 tablets in morning;1 tablet at night       busPIRone (BUSPAR) 10 MG tablet Take 1 tablet by mouth 2 (Two) Times a Day As Needed. 100 tablet 3     calcitriol (ROCALTROL) 0.5 MCG capsule Take 1 capsule by mouth Daily. 90 capsule 4     carvedilol (COREG) 6.25 MG tablet Take 1 tablet by mouth 2 (Two) Times a Day. 180 tablet 4     Cholecalciferol (D-5000) 125 MCG (5000 UT) tablet Take 1 tablet by mouth Daily Before Lunch. 30 tablet 2     cloNIDine (CATAPRES) 0.1 MG tablet Take 1 tablet by mouth Every 12 (Twelve) Hours. 60 tablet 2     Diclofenac Sodium (VOLTAREN) 1 % gel gel Apply 2 g topically to the appropriate area as directed 4 (Four) Times a Day As Needed. 300 g 11     donepezil (ARICEPT) 10 MG tablet Take 1 tablet by mouth Daily. 90 tablet 4     Dulaglutide (Trulicity) 4.5 MG/0.5ML solution pen-injector Inject 0.5 mL under the skin into the appropriate area as directed 1 (One) Time Per Week. 2 mL 11     empagliflozin (JARDIANCE) 25 MG tablet tablet Take 1 tablet by mouth Daily. 30 tablet 2     HYDROcodone-acetaminophen (NORCO) 7.5-325 MG per tablet Take 1 tablet by mouth 2 (Two) Times a Day As Needed. 40 tablet 0     Insulin Regular Human, Conc, (HumuLIN R U-500 KwikPen) 500 UNIT/ML solution pen-injector CONCENTRATED injection Inject 120 Units under the skin into the appropriate area as directed 2 (Two) Times a Day with breakfast and dinner. (Patient taking differently: Inject 120 Units under the skin into the appropriate area as directed 3 (Three) Times a Day Before Meals.) 18 mL 5     midodrine (PROAMATINE) 10 MG tablet Take 1 tablet by mouth 3 (Three) Times a  Day. 270 tablet 4     ondansetron ODT (ZOFRAN-ODT) 8 MG disintegrating tablet Place 1 tablet under tongue 3 times a day as needed. 25 tablet 1     pantoprazole (Protonix) 40 MG EC tablet Take 1 tablet by mouth 2 (Two) Times a Day. 180 tablet 4     polyethylene glycol (MIRALAX) 17 g packet Take 17 g by mouth Daily. Obtain OTC       pregabalin (LYRICA) 100 MG capsule Take 1 capsule by mouth Daily With Breakfast AND 2 capsules Every Night. 90 capsule 2     rosuvastatin (CRESTOR) 40 MG tablet Take 1 tablet by mouth Every Night. 90 tablet 3     sennosides-docusate (PERICOLACE) 8.6-50 MG per tablet Take 1 tablet by mouth Every Night. Obtain OTC       sennosides-docusate (PERICOLACE) 8.6-50 MG per tablet Take 1 tablet by mouth every night at bedtime. 30 tablet 2     sodium hypochlorite (DAKIN'S 1/4 STRENGTH) 0.125 % solution topical solution 0.125% Apply to affected area twice daily 473 mL 3     tamsulosin (FLOMAX) 0.4 MG capsule 24 hr capsule Take 1 capsule by mouth Daily. 90 capsule 1        Medicines:  Current Facility-Administered Medications   Medication Dose Route Frequency Provider Last Rate Last Admin    acetaminophen (TYLENOL) tablet 650 mg  650 mg Oral Q4H PRN Juan Manuel Page MD        Or    acetaminophen (TYLENOL) suppository 650 mg  650 mg Rectal Q4H PRN Juan Manuel Page MD        aluminum-magnesium hydroxide-simethicone (MAALOX MAX) 400-400-40 MG/5ML suspension 30 mL  30 mL Oral Q4H PRN Juan Manuel Page MD        apixaban (ELIQUIS) tablet 5 mg  5 mg Oral BID With Meals Juan Manuel Page MD        bumetanide (BUMEX) tablet 2 mg  2 mg Oral BID Willy Arteaga MD        busPIRone (BUSPAR) tablet 10 mg  10 mg Oral Q12H PRN Juan Manuel Page MD        calcitriol (ROCALTROL) capsule 0.5 mcg  0.5 mcg Oral Daily Juan Manuel Page MD        carvedilol (COREG) tablet 3.125 mg  3.125 mg Oral BID With Meals Juan Manuel Page MD        cefTRIAXone (ROCEPHIN) 1,000 mg in  sodium chloride 0.9 % 100 mL IVPB  1,000 mg Intravenous Q24H Juan Manuel Page MD        cholecalciferol (VITAMIN D3) tablet 5,000 Units  5,000 Units Oral Daily Juan Manuel Page MD        dextrose (D50W) (25 g/50 mL) IV injection 25 g  25 g Intravenous Q15 Min PRN Juan Manuel Page MD        dextrose (GLUTOSE) oral gel 15 g  15 g Oral Q15 Min PRN Juan Manuel Page MD        docusate sodium (COLACE) capsule 200 mg  200 mg Oral BID Juan Manuel Page MD        donepezil (ARICEPT) tablet 10 mg  10 mg Oral Daily Juan Manuel Page MD        Dulaglutide solution pen-injector 4.5 mg ** PATIENT-SUPPLIED MEDICATION **  4.5 mg Subcutaneous Weekly Juan Manuel Page MD        glucagon (GLUCAGEN) injection 1 mg  1 mg Intramuscular Q15 Min PRN Juan Manuel Page MD        HYDROmorphone (DILAUDID) injection 1 mg  1 mg Intravenous Q6H PRN Juan Manuel Page MD        insulin detemir (LEVEMIR) injection 30 Units  30 Units Subcutaneous Daily Juan Manuel Page MD        Insulin Lispro (humaLOG) injection 2-9 Units  2-9 Units Subcutaneous 4x Daily AC & at Bedtime Juan Manuel Page MD        isosorbide mononitrate (IMDUR) 24 hr tablet 30 mg  30 mg Oral Q24H Joanie Mendes APRN        Lidocaine HCl 4 % gel gel 1 application   1 application  Topical PRN Juan Manuel Page MD        magnesium hydroxide (MILK OF MAGNESIA) 400 MG/5ML suspension 15 mL  15 mL Oral Daily PRN Juan Manuel Page MD        melatonin tablet 6 mg  6 mg Oral Nightly Shruthi Isabel APRN        metoclopramide (REGLAN) tablet 5 mg  5 mg Oral TID AC Juan Manuel Page MD        nitroglycerin (NITROSTAT) SL tablet 0.4 mg  0.4 mg Sublingual Q5 Min PRN Shruthi Isabel APRN        ondansetron (ZOFRAN) tablet 4 mg  4 mg Oral Q6H PRN Juan Manuel Page MD        Or    ondansetron (ZOFRAN) injection 4 mg  4 mg Intravenous Q6H PRN Juan Manuel Page MD        oxyCODONE (oxyCONTIN) 12 hr tablet  10 mg  10 mg Oral Q12H Juan Manuel Page MD        oxyCODONE (ROXICODONE) immediate release tablet 5 mg  5 mg Oral Q4H PRN Juan Manuel Page MD        pantoprazole (PROTONIX) EC tablet 40 mg  40 mg Oral BID Juan Manuel Page MD        polyethylene glycol (MIRALAX) packet 17 g  17 g Oral Daily Juan Manuel Page MD        pregabalin (LYRICA) capsule 100 mg  100 mg Oral Daily Juan Manuel Page MD        pregabalin (LYRICA) capsule 200 mg  200 mg Oral Nightly Juan Manuel Page MD        rosuvastatin (CRESTOR) tablet 40 mg  40 mg Oral Nightly Juan Manuel Page MD        saccharomyces boulardii (FLORASTOR) capsule 250 mg  250 mg Oral BID Juan Manuel Page MD        sennosides-docusate (PERICOLACE) 8.6-50 MG per tablet 1 tablet  1 tablet Oral Nightly Juan Manuel Page MD        sodium chloride 0.9 % flush 10 mL  10 mL Intravenous Q12H Juan Manuel Page MD        sodium chloride 0.9 % flush 10 mL  10 mL Intravenous PRN Juan Manuel Page MD        sucralfate (CARAFATE) tablet 1 g  1 g Oral TID AC Juan Manuel Page MD        tamsulosin (FLOMAX) 24 hr capsule 0.4 mg  0.4 mg Oral Daily Juan Manuel Page MD        valsartan (DIOVAN) tablet 40 mg  40 mg Oral Nightly Juan Manuel Page MD           Past Medical History:  Past Medical History:   Diagnosis Date    Arthritis     Autonomic disease     CAD (coronary artery disease) 02/06/2017    Cervical radiculopathy 09/16/2021    Chronic constipation with acute exaccerbation 05/10/2021    Coronary artery disease     Degeneration of cervical intervertebral disc 08/11/2021    Diabetes mellitus     Diabetic foot ulcer 08/31/2020    Diabetic polyneuropathy associated with type 2 diabetes mellitus 01/18/2021    Elevated cholesterol     Gastroesophageal reflux disease 05/13/2019    Headache     HTN (hypertension), benign 05/03/2017    Hyperlipidemia     Hypertension     Mixed hyperlipidemia 02/07/2017    Multiple lung  nodules 01/26/2020    5mm, 9 mm RLL identified 1/2020, not present 10/2019.    Myocardial infarction     Osteomyelitis 01/22/2020    Osteomyelitis of fifth toe of right foot 10/07/2019    Pancreatitis     Persistent insomnia 01/20/2020    Renal disorder     Sleep apnea 02/06/2017    Sleep apnea with use of continuous positive airway pressure (CPAP)     NON-COMPLIANT    Slow transit constipation 01/16/2019    Spinal stenosis in cervical region 09/16/2021    Vitamin D deficiency 03/02/2021       Past Surgical History:  Past Surgical History:   Procedure Laterality Date    ABDOMINAL SURGERY      AMPUTATION FOOT / TOE Left 10/2021    5th digit     ANTERIOR CERVICAL DISCECTOMY W/ FUSION N/A 8/5/2022    Procedure: CERVICAL DISCECTOMY ANTERIOR WITH FUSION C5-6 with possible plating of C5-7 with neuromonitoring and 1 c-arm;  Surgeon: Karel Soliz MD;  Location:  [facility] OR;  Service: Neurosurgery;  Laterality: N/A;    APPENDECTOMY      BACK SURGERY      CARDIAC CATHETERIZATION Left 02/08/2021    Procedure: Left Heart Cath w poss intervention left anatomical snuff box acess;  Surgeon: Omkar Charles DO;  Location:  [facility] CATH INVASIVE LOCATION;  Service: Cardiology;  Laterality: Left;    CARDIAC SURGERY      CATARACT EXTRACTION      CERVICAL SPINE SURGERY      COLONOSCOPY N/A 01/31/2017    Normal exam repeat in 5 years    COLONOSCOPY N/A 02/11/2019    Mild acute inflammation    COLONOSCOPY W/ POLYPECTOMY  03/04/2014    Hyperplastic polyp    CORONARY ARTERY BYPASS GRAFT  10/2015    ENDOSCOPY  04/13/2011    Gastritis with hemorrhage    ENDOSCOPY N/A 05/05/2017    Normal exam    ENDOSCOPY N/A 02/11/2019    Gastritis    ENDOSCOPY N/A 09/01/2020    Non-erosive gastritis with hemorrhage    ENDOSCOPY N/A 02/10/2021    Esophagitis    FOOT SURGERY Left     INCISION AND DRAINAGE OF WOUND Left 09/2007    spider bite       Family History  Family History   Problem Relation Age of Onset    Colon cancer Father     Heart  disease Father     Colon cancer Sister     Colon polyps Sister     Alzheimer's disease Mother     Coronary artery disease Sister     Coronary artery disease Sister        Social History  Social History     Socioeconomic History    Marital status:    Tobacco Use    Smoking status: Former     Types: Cigarettes     Quit date:      Years since quittin.7    Smokeless tobacco: Never    Tobacco comments:     smoked in highschool   Vaping Use    Vaping Use: Never used   Substance and Sexual Activity    Alcohol use: No    Drug use: No    Sexual activity: Defer       Review of Systems:  History obtained from chart review and the patient  General ROS: No fever or chills  Respiratory ROS: No cough, shortness of breath, wheezing  Cardiovascular ROS: No chest pain or palpitations  Gastrointestinal ROS: No abdominal pain or melena  Genito-Urinary ROS: No dysuria or hematuria  Psych ROS: No anxiety and depression  14 point ROS reviewed with the patient and negative except as noted above and in the HPI unless unable to obtain.    Objective:  Vitals: BP-118/53  HR-80  temp-98.6  General: awake/alert   Chest:  clear to auscultation bilaterally without respiratory distress  CVS: regular rate and rhythm  Abdominal: soft, nontender, positive bowel sounds  Extremities: mild leg edema, left foot attached to wound VAC    Skin: lle wound vac, warm/dry      Labs:  Results from last 7 days   Lab Units 10/05/23  0502 10/02/23  0440   WBC 10*3/mm3 6.98 6.52   HEMOGLOBIN g/dL 8.8* 9.0*   HEMATOCRIT % 29.3* 29.4*   PLATELETS 10*3/mm3 244 212         Results from last 7 days   Lab Units 10/05/23  0502 10/04/23  0610 10/02/23  0440   SODIUM mmol/L 140 137 137   POTASSIUM mmol/L 4.2 4.7 5.2   CHLORIDE mmol/L 100 102 102   CO2 mmol/L 28.0 21.0* 25.0   BUN mg/dL 42* 40* 31*   CREATININE mg/dL 2.55* 2.54* 2.12*   CALCIUM mg/dL 9.4 9.0 9.1   EGFR mL/min/1.73 26.8* 27.0* 33.5*   GLUCOSE mg/dL 78 78 213*       Radiology:   Imaging Results  (Last 72 Hours)       ** No results found for the last 72 hours. **            Culture:  No results found for: BLOODCX, URINECX, WOUNDCX, MRSACX, RESPCX, STOOLCX      Assessment   Acute kidney injury/ATN  Chronic kidney disease stage IIIb  Diabetes type 2 with diabetic nephropathy  Diabetic foot ulcer with surgical debridement and wound VAC  Chronic diastolic congestive heart failure  Wound infection with Proteus  Gastroparesis  Secondary hyperparathyroidism  Anemia and chronic kidney disease  Chest Pain      Plan:  Discussed with patient, wife  Work-up reviewed today  Monitor labs   Calcitriol  Bumex at 2 mg bid since renal function is about stable.    Vinny Hartley MD  10/7/2023  13:42 CDT

## 2023-10-08 VITALS — WEIGHT: 296 LBS | BODY MASS INDEX: 40.14 KG/M2

## 2023-10-08 LAB
GLUCOSE BLDC GLUCOMTR-MCNC: 135 MG/DL (ref 70–130)
GLUCOSE BLDC GLUCOMTR-MCNC: 163 MG/DL (ref 70–130)
GLUCOSE BLDC GLUCOMTR-MCNC: 305 MG/DL (ref 70–130)
GLUCOSE BLDC GLUCOMTR-MCNC: 94 MG/DL (ref 70–130)

## 2023-10-08 PROCEDURE — 82948 REAGENT STRIP/BLOOD GLUCOSE: CPT

## 2023-10-08 PROCEDURE — 97116 GAIT TRAINING THERAPY: CPT

## 2023-10-08 PROCEDURE — 25010000002 METHYLNALTREXONE 12 MG/0.6ML SOLUTION: Performed by: NURSE PRACTITIONER

## 2023-10-08 PROCEDURE — 63710000001 ONDANSETRON PER 8 MG: Performed by: INTERNAL MEDICINE

## 2023-10-08 PROCEDURE — 25010000002 ONDANSETRON PER 1 MG: Performed by: INTERNAL MEDICINE

## 2023-10-08 PROCEDURE — 25010000002 CEFTRIAXONE PER 250 MG: Performed by: INTERNAL MEDICINE

## 2023-10-08 PROCEDURE — 97110 THERAPEUTIC EXERCISES: CPT

## 2023-10-08 PROCEDURE — 25010000002 HYDROMORPHONE 1 MG/ML SOLUTION: Performed by: INTERNAL MEDICINE

## 2023-10-08 PROCEDURE — 63710000001 INSULIN LISPRO (HUMAN) PER 5 UNITS: Performed by: INTERNAL MEDICINE

## 2023-10-08 PROCEDURE — 63710000001 INSULIN DETEMIR PER 5 UNITS: Performed by: INTERNAL MEDICINE

## 2023-10-08 RX ORDER — OXYCODONE HYDROCHLORIDE 5 MG/1
5 TABLET ORAL EVERY 4 HOURS PRN
Status: DISCONTINUED | OUTPATIENT
Start: 2023-10-08 | End: 2023-10-10

## 2023-10-08 NOTE — PROGRESS NOTES
Nephrology ([facility]) Progress Note      Patient:  Erick Luong  YOB: 1955  Date of Service: 10/8/2023  MRN: [id number]   Acct: [id number]   Primary Care Physician: Del Shetty MD  Advance Directive:   There are no questions and answers to display.     Admit Date: 9/18/2023       Hospital Day: 0  Referring Provider: Juan Manuel Page MD      Patient personally seen and examined.  Complete chart including Consults, Notes, Operative Reports, Labs, Cardiology, and Radiology studies reviewed as able.        Subjective:  Erick Luong is a 68 y.o. male for whom we were consulted for evaluation and treatment of chronic kidney disease stage IIIb.  He has stage IIIb chronic kidney disease baseline and follows with Dr. Lomas in the office as he has diabetic nephropathy.  He has a history of insulin-dependent type 2 diabetes, hypertension, abdominal obesity, obstructive sleep apnea, diabetic foot ulcer and coronary artery disease.  He was accepted as a transfer from [facility].  Patient was admitted at [facility] by Dr. Gomes and extensive debridement of left foot wound at plantar aspect.  Postoperative wound measuring 6 x 17 x 4 cm with exposed bone.  Surgical culture returned positive for Proteus Mirabills.  Patient needed long-term IV antibiotics.  He is now admitted for wound care, long-term IV antibiotics, chronic kidney disease and treatment of diastolic congestive heart failure.  Patient also has history of gastroparesis.    Today, he continues to complain severe left foot pain.  He is not ambulatory. No changes in urine habits.       Allergies:  Cefepime, Bactrim [sulfamethoxazole-trimethoprim], Vancomycin, Zolpidem, Zolpidem tartrate, and Metronidazole    Home Meds:  Medications Prior to Admission   Medication Sig Dispense Refill Last Dose    amitriptyline (ELAVIL) 25 MG tablet Take 2 tablets by mouth Daily at bedtime. 60 tablet 1      amoxicillin-clavulanate (Augmentin) 500-125 MG per tablet Take 1 tablet by mouth every 12 hours with meals for 7 days. 14 tablet 0     ascorbic acid (VITAMIN C) 1000 MG tablet Take 1 tablet by mouth Daily. 30 tablet 3     Aspirin 81 MG capsule Take 81 mg by mouth Daily.       bumetanide (BUMEX) 2 MG tablet Take 1 tablet by mouth Daily. Take 2 tablets in morning;1 tablet at night       busPIRone (BUSPAR) 10 MG tablet Take 1 tablet by mouth 2 (Two) Times a Day As Needed. 100 tablet 3     calcitriol (ROCALTROL) 0.5 MCG capsule Take 1 capsule by mouth Daily. 90 capsule 4     carvedilol (COREG) 6.25 MG tablet Take 1 tablet by mouth 2 (Two) Times a Day. 180 tablet 4     Cholecalciferol (D-5000) 125 MCG (5000 UT) tablet Take 1 tablet by mouth Daily Before Lunch. 30 tablet 2     cloNIDine (CATAPRES) 0.1 MG tablet Take 1 tablet by mouth Every 12 (Twelve) Hours. 60 tablet 2     Diclofenac Sodium (VOLTAREN) 1 % gel gel Apply 2 g topically to the appropriate area as directed 4 (Four) Times a Day As Needed. 300 g 11     donepezil (ARICEPT) 10 MG tablet Take 1 tablet by mouth Daily. 90 tablet 4     Dulaglutide (Trulicity) 4.5 MG/0.5ML solution pen-injector Inject 0.5 mL under the skin into the appropriate area as directed 1 (One) Time Per Week. 2 mL 11     empagliflozin (JARDIANCE) 25 MG tablet tablet Take 1 tablet by mouth Daily. 30 tablet 2     HYDROcodone-acetaminophen (NORCO) 7.5-325 MG per tablet Take 1 tablet by mouth 2 (Two) Times a Day As Needed. 40 tablet 0     Insulin Regular Human, Conc, (HumuLIN R U-500 KwikPen) 500 UNIT/ML solution pen-injector CONCENTRATED injection Inject 120 Units under the skin into the appropriate area as directed 2 (Two) Times a Day with breakfast and dinner. (Patient taking differently: Inject 120 Units under the skin into the appropriate area as directed 3 (Three) Times a Day Before Meals.) 18 mL 5     midodrine (PROAMATINE) 10 MG tablet Take 1 tablet by mouth 3 (Three) Times a Day. 270  tablet 4     ondansetron ODT (ZOFRAN-ODT) 8 MG disintegrating tablet Place 1 tablet under tongue 3 times a day as needed. 25 tablet 1     pantoprazole (Protonix) 40 MG EC tablet Take 1 tablet by mouth 2 (Two) Times a Day. 180 tablet 4     polyethylene glycol (MIRALAX) 17 g packet Take 17 g by mouth Daily. Obtain OTC       pregabalin (LYRICA) 100 MG capsule Take 1 capsule by mouth Daily With Breakfast AND 2 capsules Every Night. 90 capsule 2     rosuvastatin (CRESTOR) 40 MG tablet Take 1 tablet by mouth Every Night. 90 tablet 3     sennosides-docusate (PERICOLACE) 8.6-50 MG per tablet Take 1 tablet by mouth Every Night. Obtain OTC       sennosides-docusate (PERICOLACE) 8.6-50 MG per tablet Take 1 tablet by mouth every night at bedtime. 30 tablet 2     sodium hypochlorite (DAKIN'S 1/4 STRENGTH) 0.125 % solution topical solution 0.125% Apply to affected area twice daily 473 mL 3     tamsulosin (FLOMAX) 0.4 MG capsule 24 hr capsule Take 1 capsule by mouth Daily. 90 capsule 1        Medicines:  Current Facility-Administered Medications   Medication Dose Route Frequency Provider Last Rate Last Admin    acetaminophen (TYLENOL) tablet 650 mg  650 mg Oral Q4H PRN Juan Manuel Page MD        Or    acetaminophen (TYLENOL) suppository 650 mg  650 mg Rectal Q4H PRN Juan Manuel Page MD        aluminum-magnesium hydroxide-simethicone (MAALOX MAX) 400-400-40 MG/5ML suspension 30 mL  30 mL Oral Q4H PRN Juan Manuel Page MD        apixaban (ELIQUIS) tablet 5 mg  5 mg Oral BID With Meals Juan Manuel Page MD        bumetanide (BUMEX) tablet 2 mg  2 mg Oral BID Willy Arteaga MD        busPIRone (BUSPAR) tablet 10 mg  10 mg Oral Q12H PRN Juan Manuel Page MD        calcitriol (ROCALTROL) capsule 0.5 mcg  0.5 mcg Oral Daily Juan Manuel Page MD        carvedilol (COREG) tablet 3.125 mg  3.125 mg Oral BID With Meals Juan Manuel Page MD        cefTRIAXone (ROCEPHIN) 1,000 mg in sodium  chloride 0.9 % 100 mL IVPB  1,000 mg Intravenous Q24H Juan Manuel Page MD        cholecalciferol (VITAMIN D3) tablet 5,000 Units  5,000 Units Oral Daily Juan Manuel Page MD        dextrose (D50W) (25 g/50 mL) IV injection 25 g  25 g Intravenous Q15 Min PRN Juan Manuel Page MD        dextrose (GLUTOSE) oral gel 15 g  15 g Oral Q15 Min PRN Juan Manuel Page MD        docusate sodium (COLACE) capsule 200 mg  200 mg Oral BID Juan Manuel Paeg MD        donepezil (ARICEPT) tablet 10 mg  10 mg Oral Daily Juan Manuel Page MD        Dulaglutide solution pen-injector 4.5 mg ** PATIENT-SUPPLIED MEDICATION **  4.5 mg Subcutaneous Weekly Juan Manuel Page MD        glucagon (GLUCAGEN) injection 1 mg  1 mg Intramuscular Q15 Min PRN Juan Manuel Page MD        HYDROmorphone (DILAUDID) injection 1 mg  1 mg Intravenous Q6H PRN Juan Manuel Page MD        insulin detemir (LEVEMIR) injection 30 Units  30 Units Subcutaneous Daily Juan Manuel Page MD        Insulin Lispro (humaLOG) injection 2-9 Units  2-9 Units Subcutaneous 4x Daily AC & at Bedtime Juan Manuel Page MD        isosorbide mononitrate (IMDUR) 24 hr tablet 30 mg  30 mg Oral Q24H Joanie Mendes APRN        Lidocaine HCl 4 % gel gel 1 application   1 application  Topical PRN Juan Manuel Page MD        magnesium hydroxide (MILK OF MAGNESIA) 400 MG/5ML suspension 15 mL  15 mL Oral Daily PRN Juan Manuel Page MD        magnesium hydroxide (MILK OF MAGNESIA) suspension 10 mL  10 mL Oral Once Shruthi Isabel APRN        melatonin tablet 6 mg  6 mg Oral Nightly Shruthi Isabel APRN        methylnaltrexone (RELISTOR) injection 12 mg  12 mg Subcutaneous Every Other Day Shruthi Isabel APRN        metoclopramide (REGLAN) tablet 5 mg  5 mg Oral TID AC Juan Manuel Page MD        nitroglycerin (NITROSTAT) SL tablet 0.4 mg  0.4 mg Sublingual Q5 Min PRN Shruthi Isabel APRN         ondansetron (ZOFRAN) tablet 4 mg  4 mg Oral Q6H PRN Juan Manuel Page MD        Or    ondansetron (ZOFRAN) injection 4 mg  4 mg Intravenous Q6H PRN Juan Manuel Page MD        oxyCODONE (oxyCONTIN) 12 hr tablet 10 mg  10 mg Oral Q12H Juan Manuel Page MD        oxyCODONE (ROXICODONE) immediate release tablet 5 mg  5 mg Oral Q4H PRN Juan Manuel Page MD        pantoprazole (PROTONIX) EC tablet 40 mg  40 mg Oral BID Juan Manuel Page MD        polyethylene glycol (MIRALAX) packet 17 g  17 g Oral Daily Juan Manuel Page MD        pregabalin (LYRICA) capsule 100 mg  100 mg Oral Daily Juan Manuel Page MD        pregabalin (LYRICA) capsule 200 mg  200 mg Oral Nightly Juan Manuel Page MD        promethazine (PHENERGAN) 12.5 mg in sodium chloride 0.9 % 50 mL  12.5 mg Intravenous Once Juan Manuel Page MD        rosuvastatin (CRESTOR) tablet 40 mg  40 mg Oral Nightly Juan Manuel Page MD        saccharomyces boulardii (FLORASTOR) capsule 250 mg  250 mg Oral BID Juan Manuel Page MD        sennosides-docusate (PERICOLACE) 8.6-50 MG per tablet 1 tablet  1 tablet Oral Nightly Juan Manuel Page MD        sodium chloride 0.9 % flush 10 mL  10 mL Intravenous Q12H Juan Manuel Page MD        sodium chloride 0.9 % flush 10 mL  10 mL Intravenous PRN Juan Manuel Page MD        sucralfate (CARAFATE) tablet 1 g  1 g Oral TID AC Juan Manuel Page MD        tamsulosin (FLOMAX) 24 hr capsule 0.4 mg  0.4 mg Oral Daily Juan Manuel Page MD        valsartan (DIOVAN) tablet 40 mg  40 mg Oral Nightly Juan Manuel Page MD           Past Medical History:  Past Medical History:   Diagnosis Date    Arthritis     Autonomic disease     CAD (coronary artery disease) 02/06/2017    Cervical radiculopathy 09/16/2021    Chronic constipation with acute exaccerbation 05/10/2021    Coronary artery disease     Degeneration of cervical intervertebral disc 08/11/2021     Diabetes mellitus     Diabetic foot ulcer 08/31/2020    Diabetic polyneuropathy associated with type 2 diabetes mellitus 01/18/2021    Elevated cholesterol     Gastroesophageal reflux disease 05/13/2019    Headache     HTN (hypertension), benign 05/03/2017    Hyperlipidemia     Hypertension     Mixed hyperlipidemia 02/07/2017    Multiple lung nodules 01/26/2020    5mm, 9 mm RLL identified 1/2020, not present 10/2019.    Myocardial infarction     Osteomyelitis 01/22/2020    Osteomyelitis of fifth toe of right foot 10/07/2019    Pancreatitis     Persistent insomnia 01/20/2020    Renal disorder     Sleep apnea 02/06/2017    Sleep apnea with use of continuous positive airway pressure (CPAP)     NON-COMPLIANT    Slow transit constipation 01/16/2019    Spinal stenosis in cervical region 09/16/2021    Vitamin D deficiency 03/02/2021       Past Surgical History:  Past Surgical History:   Procedure Laterality Date    ABDOMINAL SURGERY      AMPUTATION FOOT / TOE Left 10/2021    5th digit     ANTERIOR CERVICAL DISCECTOMY W/ FUSION N/A 8/5/2022    Procedure: CERVICAL DISCECTOMY ANTERIOR WITH FUSION C5-6 with possible plating of C5-7 with neuromonitoring and 1 c-arm;  Surgeon: Karel Soliz MD;  Location:  [facility] OR;  Service: Neurosurgery;  Laterality: N/A;    APPENDECTOMY      BACK SURGERY      CARDIAC CATHETERIZATION Left 02/08/2021    Procedure: Left Heart Cath w poss intervention left anatomical snuff box acess;  Surgeon: Omkar Charles DO;  Location:  [facility] CATH INVASIVE LOCATION;  Service: Cardiology;  Laterality: Left;    CARDIAC SURGERY      CATARACT EXTRACTION      CERVICAL SPINE SURGERY      COLONOSCOPY N/A 01/31/2017    Normal exam repeat in 5 years    COLONOSCOPY N/A 02/11/2019    Mild acute inflammation    COLONOSCOPY W/ POLYPECTOMY  03/04/2014    Hyperplastic polyp    CORONARY ARTERY BYPASS GRAFT  10/2015    ENDOSCOPY  04/13/2011    Gastritis with hemorrhage    ENDOSCOPY N/A 05/05/2017    Normal  exam    ENDOSCOPY N/A 2019    Gastritis    ENDOSCOPY N/A 2020    Non-erosive gastritis with hemorrhage    ENDOSCOPY N/A 02/10/2021    Esophagitis    FOOT SURGERY Left     INCISION AND DRAINAGE OF WOUND Left 2007    spider bite       Family History  Family History   Problem Relation Age of Onset    Colon cancer Father     Heart disease Father     Colon cancer Sister     Colon polyps Sister     Alzheimer's disease Mother     Coronary artery disease Sister     Coronary artery disease Sister        Social History  Social History     Socioeconomic History    Marital status:    Tobacco Use    Smoking status: Former     Types: Cigarettes     Quit date:      Years since quittin.7    Smokeless tobacco: Never    Tobacco comments:     smoked in [employer]   Vaping Use    Vaping Use: Never used   Substance and Sexual Activity    Alcohol use: No    Drug use: No    Sexual activity: Defer       Review of Systems:  History obtained from chart review and the patient  General ROS: No fever or chills  Respiratory ROS: No cough, shortness of breath, wheezing  Cardiovascular ROS: No chest pain or palpitations  Gastrointestinal ROS: No abdominal pain or melena  Genito-Urinary ROS: No dysuria or hematuria  Psych ROS: No anxiety and depression  14 point ROS reviewed with the patient and negative except as noted above and in the HPI unless unable to obtain.    Objective:  Vitals: BP-118/53  HR-80  temp-98.6  General: awake/alert   Chest:  clear to auscultation bilaterally without respiratory distress  CVS: regular rate and rhythm  Abdominal: soft, nontender, positive bowel sounds  Extremities: mild leg edema, left foot attached to wound VAC    Skin: lle wound vac, warm/dry      Labs:  Results from last 7 days   Lab Units 10/05/23  0502 10/02/23  0440   WBC 10*3/mm3 6.98 6.52   HEMOGLOBIN g/dL 8.8* 9.0*   HEMATOCRIT % 29.3* 29.4*   PLATELETS 10*3/mm3 244 212         Results from last 7 days   Lab Units  10/05/23  0502 10/04/23  0610 10/02/23  0440   SODIUM mmol/L 140 137 137   POTASSIUM mmol/L 4.2 4.7 5.2   CHLORIDE mmol/L 100 102 102   CO2 mmol/L 28.0 21.0* 25.0   BUN mg/dL 42* 40* 31*   CREATININE mg/dL 2.55* 2.54* 2.12*   CALCIUM mg/dL 9.4 9.0 9.1   EGFR mL/min/1.73 26.8* 27.0* 33.5*   GLUCOSE mg/dL 78 78 213*       Radiology:   Imaging Results (Last 72 Hours)       Procedure Component Value Units Date/Time    XR Abdomen KUB [[id number]] Collected: 10/07/23 2124     Updated: 10/07/23 2128    Narrative:      EXAMINATION: XR ABDOMEN KUB-  10/7/2023 9:24 PM CDT     HISTORY: Acute vomiting.     FINDINGS: KUB radiograph demonstrates moderate constipation with a large  volume of stool throughout the colon. There is moderate gaseous  distention of the stomach. No free air.       Impression:      1.. Moderate constipation.  2. Moderate gaseous distention of the stomach.     This report was signed and finalized on 10/7/2023 9:25 PM CDT by Dr. Phillip Hart MD.               Culture:  No results found for: BLOODCX, URINECX, WOUNDCX, MRSACX, RESPCX, STOOLCX      Assessment   Acute kidney injury/ATN  Chronic kidney disease stage IIIb  Diabetes type 2 with diabetic nephropathy  Diabetic foot ulcer with surgical debridement and wound VAC  Chronic diastolic congestive heart failure  Wound infection with Proteus  Gastroparesis  Secondary hyperparathyroidism  Anemia and chronic kidney disease  Chest Pain      Plan:  Discussed with patient  Work-up reviewed today  Monitor labs   Calcitriol  Bumex at 2 mg bid     Vinny Hartley MD  10/8/2023  14:56 CDT

## 2023-10-08 NOTE — PROGRESS NOTES
[facility]  NIMESH Parkinson APRN        Internal Medicine Progress Note    10/8/2023   11:29 CDT    Name:  Erick Luong  MRN:    [id number]     Acct:     [id number]   Room:  [address] [place] [address] Day: 0     Admit Date: 9/18/2023  6:30 PM  PCP: Del Shetty MD    Subjective:     C/C: weakness, fatigue    Interval History: Status:  improved. Up to chair. No family at bedside.  Afebrile. Woke from sleep. Blood sugars stable. Episodes of nausea and vomiting overnight.  Pain well controlled at present time.  Discharge planning underway.     Review of Systems   Constitutional: Positive for malaise/fatigue. Negative for chills, decreased appetite, weight gain and weight loss.   HENT:  Negative for congestion, ear discharge, hoarse voice and tinnitus.    Eyes:  Negative for blurred vision, discharge, visual disturbance and visual halos.   Cardiovascular:  Negative for chest pain, claudication, dyspnea on exertion, irregular heartbeat, leg swelling, orthopnea and paroxysmal nocturnal dyspnea.   Respiratory:  Negative for cough, shortness of breath, sputum production and wheezing.    Endocrine: Negative for cold intolerance, heat intolerance and polyuria.   Hematologic/Lymphatic: Negative for adenopathy. Does not bruise/bleed easily.   Skin:  Positive for poor wound healing. Negative for dry skin, itching and suspicious lesions.   Musculoskeletal:  Positive for neck pain. Negative for arthritis, back pain, falls, joint pain, muscle weakness and myalgias.   Gastrointestinal:  Negative for abdominal pain, constipation, diarrhea, dysphagia and hematemesis.   Genitourinary:  Negative for bladder incontinence, dysuria and frequency.   Neurological:  Positive for weakness. Negative for aphonia, disturbances in coordination and dizziness.   Psychiatric/Behavioral:  Negative for altered mental status, depression, memory loss and substance abuse. The patient does not have insomnia and is not nervous/anxious.   "        Medications:     Allergies:   Allergies   Allergen Reactions    Cefepime Hives and Anaphylaxis    Bactrim [Sulfamethoxazole-Trimethoprim] Other (See Comments)     \"RENAL FAILURE\"    Vancomycin Itching    Zolpidem Unknown - High Severity     \"makes him crazy\"    Zolpidem Tartrate Unknown - Low Severity and Provider Review Needed    Metronidazole Rash       Current Meds:   Current Facility-Administered Medications:     acetaminophen (TYLENOL) tablet 650 mg, 650 mg, Oral, Q4H PRN **OR** acetaminophen (TYLENOL) suppository 650 mg, 650 mg, Rectal, Q4H PRN, Juan Manuel Page MD    aluminum-magnesium hydroxide-simethicone (MAALOX MAX) 400-400-40 MG/5ML suspension 30 mL, 30 mL, Oral, Q4H PRN, Juan Manuel Page MD    apixaban (ELIQUIS) tablet 5 mg, 5 mg, Oral, BID With Meals, Juan Manuel Page MD    bumetanide (BUMEX) tablet 2 mg, 2 mg, Oral, BID, Willy Arteaga MD    busPIRone (BUSPAR) tablet 10 mg, 10 mg, Oral, Q12H PRN, Juan Manuel Page MD    calcitriol (ROCALTROL) capsule 0.5 mcg, 0.5 mcg, Oral, Daily, Juan Manuel Page MD    carvedilol (COREG) tablet 3.125 mg, 3.125 mg, Oral, BID With Meals, Juan Manuel Page MD    cefTRIAXone (ROCEPHIN) 1,000 mg in sodium chloride 0.9 % 100 mL IVPB, 1,000 mg, Intravenous, Q24H, Juan Manuel Page MD    cholecalciferol (VITAMIN D3) tablet 5,000 Units, 5,000 Units, Oral, Daily, Juan Manuel Page MD    dextrose (D50W) (25 g/50 mL) IV injection 25 g, 25 g, Intravenous, Q15 Min PRN, Juan Manuel Page MD    dextrose (GLUTOSE) oral gel 15 g, 15 g, Oral, Q15 Min PRN, Juan Manuel Page MD    docusate sodium (COLACE) capsule 200 mg, 200 mg, Oral, BID, Juan Manuel Page MD    donepezil (ARICEPT) tablet 10 mg, 10 mg, Oral, Daily, Juan Manuel Page MD    Dulaglutide solution pen-injector 4.5 mg ** PATIENT-SUPPLIED MEDICATION **, 4.5 mg, Subcutaneous, Weekly, Juan Manuel Page MD    glucagon (GLUCAGEN) injection 1 " mg, 1 mg, Intramuscular, Q15 Min PRN, Juan Manuel Page MD    HYDROmorphone (DILAUDID) injection 1 mg, 1 mg, Intravenous, Q6H PRN, Juan Manuel Page MD    insulin detemir (LEVEMIR) injection 30 Units, 30 Units, Subcutaneous, Daily, Juan Manuel Page MD    Insulin Lispro (humaLOG) injection 2-9 Units, 2-9 Units, Subcutaneous, 4x Daily AC & at Bedtime, Juan Manuel Page MD    isosorbide mononitrate (IMDUR) 24 hr tablet 30 mg, 30 mg, Oral, Q24H, Joanie Mendes APRN    Lidocaine HCl 4 % gel gel 1 application , 1 application , Topical, PRN, Juan Manuel Page MD    magnesium hydroxide (MILK OF MAGNESIA) 400 MG/5ML suspension 15 mL, 15 mL, Oral, Daily PRN, Juan Manuel Page MD    magnesium hydroxide (MILK OF MAGNESIA) suspension 10 mL, 10 mL, Oral, Once, Shruthi Isabel APRN    melatonin tablet 6 mg, 6 mg, Oral, Nightly, Shruthi Isabel APRN    methylnaltrexone (RELISTOR) injection 12 mg, 12 mg, Subcutaneous, Every Other Day, Shruthi Isabel APRN    metoclopramide (REGLAN) tablet 5 mg, 5 mg, Oral, TID AC, Juan Manuel Page MD    nitroglycerin (NITROSTAT) SL tablet 0.4 mg, 0.4 mg, Sublingual, Q5 Min PRN, Shruthi Isabel APRN    ondansetron (ZOFRAN) tablet 4 mg, 4 mg, Oral, Q6H PRN **OR** ondansetron (ZOFRAN) injection 4 mg, 4 mg, Intravenous, Q6H PRN, Juan Manuel Page MD    oxyCODONE (oxyCONTIN) 12 hr tablet 10 mg, 10 mg, Oral, Q12H, Juan Manuel Page MD    oxyCODONE (ROXICODONE) immediate release tablet 5 mg, 5 mg, Oral, Q4H PRN, Juan Manuel Page MD    pantoprazole (PROTONIX) EC tablet 40 mg, 40 mg, Oral, BID, Juan Manuel Page MD    polyethylene glycol (MIRALAX) packet 17 g, 17 g, Oral, Daily, Juan Manuel Page MD    pregabalin (LYRICA) capsule 100 mg, 100 mg, Oral, Daily, Juan Manuel Page MD    pregabalin (LYRICA) capsule 200 mg, 200 mg, Oral, Nightly, Juan Manuel Page MD    promethazine (PHENERGAN) 12.5 mg in sodium  chloride 0.9 % 50 mL, 12.5 mg, Intravenous, Once, Juan Manuel Page MD    rosuvastatin (CRESTOR) tablet 40 mg, 40 mg, Oral, Nightly, Juan Manuel Page MD    saccharomyces boulardii (FLORASTOR) capsule 250 mg, 250 mg, Oral, BID, Juan Manuel Page MD    sennosides-docusate (PERICOLACE) 8.6-50 MG per tablet 1 tablet, 1 tablet, Oral, Nightly, Juan Manuel Page MD    sodium chloride 0.9 % flush 10 mL, 10 mL, Intravenous, Q12H, Juan Manuel Page MD    sodium chloride 0.9 % flush 10 mL, 10 mL, Intravenous, PRN, Juan Manuel Page MD    sucralfate (CARAFATE) tablet 1 g, 1 g, Oral, TID AC, Juan Manuel Page MD    tamsulosin (FLOMAX) 24 hr capsule 0.4 mg, 0.4 mg, Oral, Daily, Juan Manuel Page MD    valsartan (DIOVAN) tablet 40 mg, 40 mg, Oral, Nightly, Juan Manuel Page MD    Data:     Code Status:    There are no questions and answers to display.       Family History   Problem Relation Age of Onset    Colon cancer Father     Heart disease Father     Colon cancer Sister     Colon polyps Sister     Alzheimer's disease Mother     Coronary artery disease Sister     Coronary artery disease Sister        Social History     Socioeconomic History    Marital status:    Tobacco Use    Smoking status: Former     Types: Cigarettes     Quit date:      Years since quittin.7    Smokeless tobacco: Never    Tobacco comments:     smoked in highschool   Vaping Use    Vaping Use: Never used   Substance and Sexual Activity    Alcohol use: No    Drug use: No    Sexual activity: Defer       Vitals:  T 97.9 P 75 R 18 Bp 148/52 Sp02 99% (room air)            I/O (24Hr):  No intake or output data in the 24 hours ending 10/08/23 1129    Labs and imaging:      Recent Results (from the past 12 hour(s))   POC Glucose Once    Collection Time: 10/08/23  8:21 AM    Specimen: Blood   Result Value Ref Range    Glucose 94 70 - 130 mg/dL                               Physical Examination:         Physical Exam  Vitals and nursing note reviewed.   Constitutional:       Appearance: Normal appearance. He is well-developed. He is obese.   HENT:      Head: Normocephalic and atraumatic.      Right Ear: External ear normal.      Left Ear: External ear normal.      Nose: Nose normal.      Mouth/Throat:      Mouth: Mucous membranes are moist.      Pharynx: Oropharynx is clear.   Eyes:      Pupils: Pupils are equal, round, and reactive to light.   Cardiovascular:      Rate and Rhythm: Normal rate. Rhythm irregular.      Heart sounds: Normal heart sounds.   Pulmonary:      Effort: Pulmonary effort is normal.      Breath sounds: Normal breath sounds.   Abdominal:      General: Bowel sounds are normal.      Palpations: Abdomen is soft.      Comments: obese   Musculoskeletal:         General: Normal range of motion.      Cervical back: Normal range of motion and neck supple.      Comments: Generalized weakness   Skin:     General: Skin is warm and dry.      Comments: Wound vac intact   Neurological:      Mental Status: He is alert and oriented to person, place, and time.      Deep Tendon Reflexes: Reflexes are normal and symmetric.   Psychiatric:         Behavior: Behavior normal.           Assessment:            * No active hospital problems. *    Past Medical History:   Diagnosis Date    Arthritis     Autonomic disease     CAD (coronary artery disease) 02/06/2017    Cervical radiculopathy 09/16/2021    Chronic constipation with acute exaccerbation 05/10/2021    Coronary artery disease     Degeneration of cervical intervertebral disc 08/11/2021    Diabetes mellitus     Diabetic foot ulcer 08/31/2020    Diabetic polyneuropathy associated with type 2 diabetes mellitus 01/18/2021    Elevated cholesterol     Gastroesophageal reflux disease 05/13/2019    Headache     HTN (hypertension), benign 05/03/2017    Hyperlipidemia     Hypertension     Mixed hyperlipidemia 02/07/2017    Multiple lung nodules 01/26/2020    5mm, 9 mm  RLL identified 1/2020, not present 10/2019.    Myocardial infarction     Osteomyelitis 01/22/2020    Osteomyelitis of fifth toe of right foot 10/07/2019    Pancreatitis     Persistent insomnia 01/20/2020    Renal disorder     Sleep apnea 02/06/2017    Sleep apnea with use of continuous positive airway pressure (CPAP)     NON-COMPLIANT    Slow transit constipation 01/16/2019    Spinal stenosis in cervical region 09/16/2021    Vitamin D deficiency 03/02/2021        Plan:        Proteus wound infection s/p debridement  New onset atrial fibrillation  Acute on chronic diastolic CHF  CKD3  Multifactorial anemia  DM2 with hyperglycemia on long term insulin  BRITTNI  Chronic pain syndrome  Chronic neck pain with radiculopathy  Diabetic peripheral neuropathy  Hx CAD  GERD  Possible gastroparesis  Sepsis secondary to wound infection  Continue current treatment. Monitor counts. Increase activity. Labs in am. Wound care per wound care team. Continue IV antibiotics. Appreciate nephrology evaluation and treatment. Continue glycemic control efforts. Maintain patient safety. Continue pain control efforts. Appreciate cardiology evaluation and recommendations. Add relistor and continue bowel regimen.  Discharge planning.       Electronically signed by SUSAN Muñoz on 10/8/2023 at 11:29 CDT     I have discussed the care of Erick Luong, including pertinent history and exam findings, with the nurse practitioner.    I have seen and examined the patient and the key elements of all parts of the encounter have been performed by me.  I agree with the assessment, plan and orders as documented by SUSAN Haney, after I modified the exam findings and the plan of treatments and the final version is my approved version of the assessment.        Electronically signed by Juan Manuel Page MD on 10/8/2023 at 20:09 CDT

## 2023-10-09 LAB
ANION GAP SERPL CALCULATED.3IONS-SCNC: 13 MMOL/L (ref 5–15)
BASOPHILS # BLD AUTO: 0.07 10*3/MM3 (ref 0–0.2)
BASOPHILS NFR BLD AUTO: 0.9 % (ref 0–1.5)
BUN SERPL-MCNC: 46 MG/DL (ref 8–23)
BUN/CREAT SERPL: 14.6 (ref 7–25)
CALCIUM SPEC-SCNC: 9.7 MG/DL (ref 8.6–10.5)
CHLORIDE SERPL-SCNC: 96 MMOL/L (ref 98–107)
CO2 SERPL-SCNC: 31 MMOL/L (ref 22–29)
CREAT SERPL-MCNC: 3.15 MG/DL (ref 0.76–1.27)
CRP SERPL-MCNC: 2.75 MG/DL (ref 0–0.5)
DEPRECATED RDW RBC AUTO: 51.3 FL (ref 37–54)
EGFRCR SERPLBLD CKD-EPI 2021: 20.8 ML/MIN/1.73
EOSINOPHIL # BLD AUTO: 0.51 10*3/MM3 (ref 0–0.4)
EOSINOPHIL NFR BLD AUTO: 6.5 % (ref 0.3–6.2)
ERYTHROCYTE [DISTWIDTH] IN BLOOD BY AUTOMATED COUNT: 15.6 % (ref 12.3–15.4)
ERYTHROCYTE [SEDIMENTATION RATE] IN BLOOD: 31 MM/HR (ref 0–20)
GLUCOSE BLDC GLUCOMTR-MCNC: 128 MG/DL (ref 70–130)
GLUCOSE BLDC GLUCOMTR-MCNC: 179 MG/DL (ref 70–130)
GLUCOSE BLDC GLUCOMTR-MCNC: 212 MG/DL (ref 70–130)
GLUCOSE BLDC GLUCOMTR-MCNC: 500 MG/DL (ref 70–130)
GLUCOSE SERPL-MCNC: 181 MG/DL (ref 65–99)
HCT VFR BLD AUTO: 32 % (ref 37.5–51)
HGB BLD-MCNC: 9.5 G/DL (ref 13–17.7)
IMM GRANULOCYTES # BLD AUTO: 0.02 10*3/MM3 (ref 0–0.05)
IMM GRANULOCYTES NFR BLD AUTO: 0.3 % (ref 0–0.5)
LYMPHOCYTES # BLD AUTO: 1.59 10*3/MM3 (ref 0.7–3.1)
LYMPHOCYTES NFR BLD AUTO: 20.4 % (ref 19.6–45.3)
MCH RBC QN AUTO: 26.7 PG (ref 26.6–33)
MCHC RBC AUTO-ENTMCNC: 29.7 G/DL (ref 31.5–35.7)
MCV RBC AUTO: 89.9 FL (ref 79–97)
MONOCYTES # BLD AUTO: 0.88 10*3/MM3 (ref 0.1–0.9)
MONOCYTES NFR BLD AUTO: 11.3 % (ref 5–12)
NEUTROPHILS NFR BLD AUTO: 4.74 10*3/MM3 (ref 1.7–7)
NEUTROPHILS NFR BLD AUTO: 60.6 % (ref 42.7–76)
NRBC BLD AUTO-RTO: 0 /100 WBC (ref 0–0.2)
PLATELET # BLD AUTO: 280 10*3/MM3 (ref 140–450)
PMV BLD AUTO: 11.8 FL (ref 6–12)
POTASSIUM SERPL-SCNC: 4.4 MMOL/L (ref 3.5–5.2)
RBC # BLD AUTO: 3.56 10*6/MM3 (ref 4.14–5.8)
SODIUM SERPL-SCNC: 140 MMOL/L (ref 136–145)
WBC NRBC COR # BLD: 7.81 10*3/MM3 (ref 3.4–10.8)

## 2023-10-09 PROCEDURE — 97110 THERAPEUTIC EXERCISES: CPT

## 2023-10-09 PROCEDURE — 25010000002 HYDROMORPHONE 1 MG/ML SOLUTION: Performed by: INTERNAL MEDICINE

## 2023-10-09 PROCEDURE — 63710000001 INSULIN LISPRO (HUMAN) PER 5 UNITS: Performed by: INTERNAL MEDICINE

## 2023-10-09 PROCEDURE — 82948 REAGENT STRIP/BLOOD GLUCOSE: CPT

## 2023-10-09 PROCEDURE — 80048 BASIC METABOLIC PNL TOTAL CA: CPT | Performed by: INTERNAL MEDICINE

## 2023-10-09 PROCEDURE — 92507 TX SP LANG VOICE COMM INDIV: CPT | Performed by: SPEECH-LANGUAGE PATHOLOGIST

## 2023-10-09 PROCEDURE — 97530 THERAPEUTIC ACTIVITIES: CPT

## 2023-10-09 PROCEDURE — 85025 COMPLETE CBC W/AUTO DIFF WBC: CPT | Performed by: INTERNAL MEDICINE

## 2023-10-09 PROCEDURE — 97535 SELF CARE MNGMENT TRAINING: CPT

## 2023-10-09 PROCEDURE — 86140 C-REACTIVE PROTEIN: CPT | Performed by: INTERNAL MEDICINE

## 2023-10-09 PROCEDURE — 97116 GAIT TRAINING THERAPY: CPT

## 2023-10-09 PROCEDURE — 63710000001 INSULIN DETEMIR PER 5 UNITS: Performed by: INTERNAL MEDICINE

## 2023-10-09 PROCEDURE — 25010000002 CEFTRIAXONE PER 250 MG: Performed by: INTERNAL MEDICINE

## 2023-10-09 PROCEDURE — 85652 RBC SED RATE AUTOMATED: CPT | Performed by: INTERNAL MEDICINE

## 2023-10-09 RX ORDER — BUMETANIDE 1 MG/1
1 TABLET ORAL
Status: DISCONTINUED | OUTPATIENT
Start: 2023-10-09 | End: 2023-10-11 | Stop reason: HOSPADM

## 2023-10-09 RX ORDER — OXYCODONE HCL 10 MG/1
10 TABLET, FILM COATED, EXTENDED RELEASE ORAL EVERY 12 HOURS SCHEDULED
Status: DISCONTINUED | OUTPATIENT
Start: 2023-10-09 | End: 2023-10-10

## 2023-10-09 NOTE — PROGRESS NOTES
Nephrology ([facility]) Progress Note      Patient:  Erick Luong  YOB: 1955  Date of Service: 10/9/2023  MRN: [id number]   Acct: [id number]   Primary Care Physician: Del Shetty MD  Advance Directive:   There are no questions and answers to display.     Admit Date: 9/18/2023       Hospital Day: 0  Referring Provider: Juan Manuel Page MD      Patient personally seen and examined.  Complete chart including Consults, Notes, Operative Reports, Labs, Cardiology, and Radiology studies reviewed as able.    Chief complaint: Abnormal labs.    Subjective:  Erick Luong is a 68 y.o. male  whom we were consulted for chronic kidney disease stage IIIb.  He has stage IIIb chronic kidney disease baseline, follows me in the office as he has diabetic nephropathy.  He has history of insulin-dependent type 2 diabetes, hypertension, abdominal obesity, obstructive sleep apnea, diabetic foot ulcer and coronary artery disease.  He was accepted as a transfer from [facility].  Patient was admitted at [facility] by Dr. Gomes and extensive debridement of left foot wound at plantar aspect.  Postoperative wound measuring 6 x 17 x 4 cm with exposed bone.  Surgical culture returned positive for Proteus Mirabills.  Patient needed long-term IV antibiotics.  He is now admitted for wound care, long-term IV antibiotics, chronic kidney disease and treatment of diastolic congestive heart failure.  Patient also has history of gastroparesis.       This afternoon patient is sitting up and eating lunch.  He denies any shortness of breath.  His renal function is worsening      Allergies:  Cefepime, Bactrim [sulfamethoxazole-trimethoprim], Vancomycin, Zolpidem, Zolpidem tartrate, and Metronidazole    Home Meds:  Medications Prior to Admission   Medication Sig Dispense Refill Last Dose    amitriptyline (ELAVIL) 25 MG tablet Take 2 tablets by mouth Daily at bedtime. 60 tablet 1      amoxicillin-clavulanate (Augmentin) 500-125 MG per tablet Take 1 tablet by mouth every 12 hours with meals for 7 days. 14 tablet 0     ascorbic acid (VITAMIN C) 1000 MG tablet Take 1 tablet by mouth Daily. 30 tablet 3     Aspirin 81 MG capsule Take 81 mg by mouth Daily.       bumetanide (BUMEX) 2 MG tablet Take 1 tablet by mouth Daily. Take 2 tablets in morning;1 tablet at night       busPIRone (BUSPAR) 10 MG tablet Take 1 tablet by mouth 2 (Two) Times a Day As Needed. 100 tablet 3     calcitriol (ROCALTROL) 0.5 MCG capsule Take 1 capsule by mouth Daily. 90 capsule 4     carvedilol (COREG) 6.25 MG tablet Take 1 tablet by mouth 2 (Two) Times a Day. 180 tablet 4     Cholecalciferol (D-5000) 125 MCG (5000 UT) tablet Take 1 tablet by mouth Daily Before Lunch. 30 tablet 2     cloNIDine (CATAPRES) 0.1 MG tablet Take 1 tablet by mouth Every 12 (Twelve) Hours. 60 tablet 2     Diclofenac Sodium (VOLTAREN) 1 % gel gel Apply 2 g topically to the appropriate area as directed 4 (Four) Times a Day As Needed. 300 g 11     donepezil (ARICEPT) 10 MG tablet Take 1 tablet by mouth Daily. 90 tablet 4     Dulaglutide (Trulicity) 4.5 MG/0.5ML solution pen-injector Inject 0.5 mL under the skin into the appropriate area as directed 1 (One) Time Per Week. 2 mL 11     empagliflozin (JARDIANCE) 25 MG tablet tablet Take 1 tablet by mouth Daily. 30 tablet 2     HYDROcodone-acetaminophen (NORCO) 7.5-325 MG per tablet Take 1 tablet by mouth 2 (Two) Times a Day As Needed. 40 tablet 0     Insulin Regular Human, Conc, (HumuLIN R U-500 KwikPen) 500 UNIT/ML solution pen-injector CONCENTRATED injection Inject 120 Units under the skin into the appropriate area as directed 2 (Two) Times a Day with breakfast and dinner. (Patient taking differently: Inject 120 Units under the skin into the appropriate area as directed 3 (Three) Times a Day Before Meals.) 18 mL 5     midodrine (PROAMATINE) 10 MG tablet Take 1 tablet by mouth 3 (Three) Times a Day. 270  tablet 4     ondansetron ODT (ZOFRAN-ODT) 8 MG disintegrating tablet Place 1 tablet under tongue 3 times a day as needed. 25 tablet 1     pantoprazole (Protonix) 40 MG EC tablet Take 1 tablet by mouth 2 (Two) Times a Day. 180 tablet 4     polyethylene glycol (MIRALAX) 17 g packet Take 17 g by mouth Daily. Obtain OTC       pregabalin (LYRICA) 100 MG capsule Take 1 capsule by mouth Daily With Breakfast AND 2 capsules Every Night. 90 capsule 2     rosuvastatin (CRESTOR) 40 MG tablet Take 1 tablet by mouth Every Night. 90 tablet 3     sennosides-docusate (PERICOLACE) 8.6-50 MG per tablet Take 1 tablet by mouth Every Night. Obtain OTC       sennosides-docusate (PERICOLACE) 8.6-50 MG per tablet Take 1 tablet by mouth every night at bedtime. 30 tablet 2     sodium hypochlorite (DAKIN'S 1/4 STRENGTH) 0.125 % solution topical solution 0.125% Apply to affected area twice daily 473 mL 3     tamsulosin (FLOMAX) 0.4 MG capsule 24 hr capsule Take 1 capsule by mouth Daily. 90 capsule 1        Medicines:  Current Facility-Administered Medications   Medication Dose Route Frequency Provider Last Rate Last Admin    acetaminophen (TYLENOL) tablet 650 mg  650 mg Oral Q4H PRN Juan Manuel Page MD        Or    acetaminophen (TYLENOL) suppository 650 mg  650 mg Rectal Q4H PRN Juan Manuel Page MD        aluminum-magnesium hydroxide-simethicone (MAALOX MAX) 400-400-40 MG/5ML suspension 30 mL  30 mL Oral Q4H PRN Juan Manuel Page MD        apixaban (ELIQUIS) tablet 5 mg  5 mg Oral BID With Meals Juan Manuel Page MD        bumetanide (BUMEX) tablet 2 mg  2 mg Oral BID Willy Arteaga MD        busPIRone (BUSPAR) tablet 10 mg  10 mg Oral Q12H PRN Juan Manuel Page MD        calcitriol (ROCALTROL) capsule 0.5 mcg  0.5 mcg Oral Daily Juan Manuel Page MD        carvedilol (COREG) tablet 3.125 mg  3.125 mg Oral BID With Meals Juan Manuel Page MD        cefTRIAXone (ROCEPHIN) 1,000 mg in sodium  chloride 0.9 % 100 mL IVPB  1,000 mg Intravenous Q24H Juan Manuel Page MD        cholecalciferol (VITAMIN D3) tablet 5,000 Units  5,000 Units Oral Daily Juan Manuel Page MD        dextrose (D50W) (25 g/50 mL) IV injection 25 g  25 g Intravenous Q15 Min PRN Juan Manuel Page MD        dextrose (GLUTOSE) oral gel 15 g  15 g Oral Q15 Min PRN Juan Manuel Page MD        docusate sodium (COLACE) capsule 200 mg  200 mg Oral BID Juan Manuel Page MD        donepezil (ARICEPT) tablet 10 mg  10 mg Oral Daily Juan Manuel Page MD        Dulaglutide solution pen-injector 4.5 mg ** PATIENT-SUPPLIED MEDICATION **  4.5 mg Subcutaneous Weekly Juan Manuel Page MD        glucagon (GLUCAGEN) injection 1 mg  1 mg Intramuscular Q15 Min PRN Juan Manuel Page MD        HYDROmorphone (DILAUDID) injection 1 mg  1 mg Intravenous Q6H PRN Juan Manuel Page MD        insulin detemir (LEVEMIR) injection 30 Units  30 Units Subcutaneous Daily Juan Manuel Page MD        Insulin Lispro (humaLOG) injection 2-9 Units  2-9 Units Subcutaneous 4x Daily AC & at Bedtime Juan Manuel Page MD        isosorbide mononitrate (IMDUR) 24 hr tablet 30 mg  30 mg Oral Q24H Joanie Mendes APRN        Lidocaine HCl 4 % gel gel 1 application   1 application  Topical PRN Juan Manuel Page MD        magnesium hydroxide (MILK OF MAGNESIA) 400 MG/5ML suspension 15 mL  15 mL Oral Daily PRN Juan Manuel Page MD        melatonin tablet 6 mg  6 mg Oral Nightly Shruthi Isabel APRN        metoclopramide (REGLAN) tablet 5 mg  5 mg Oral TID AC Juan Manuel Page MD        nitroglycerin (NITROSTAT) SL tablet 0.4 mg  0.4 mg Sublingual Q5 Min PRN Shruthi Isabel APRN        ondansetron (ZOFRAN) tablet 4 mg  4 mg Oral Q6H PRN Juan Manuel Page MD        Or    ondansetron (ZOFRAN) injection 4 mg  4 mg Intravenous Q6H PRN Juan Manuel Page MD        oxyCODONE (oxyCONTIN) 12 hr tablet 10 mg   10 mg Oral Q12H Juan Manuel Page MD        oxyCODONE (ROXICODONE) immediate release tablet 5 mg  5 mg Oral Q4H PRN Juan Manuel Page MD        pantoprazole (PROTONIX) EC tablet 40 mg  40 mg Oral BID Juan Manuel Page MD        polyethylene glycol (MIRALAX) packet 17 g  17 g Oral Daily Juan Manuel Page MD        pregabalin (LYRICA) capsule 100 mg  100 mg Oral Daily Juan Manuel Page MD        pregabalin (LYRICA) capsule 200 mg  200 mg Oral Nightly Juan Manuel Page MD        rosuvastatin (CRESTOR) tablet 40 mg  40 mg Oral Nightly Juan Manuel Page MD        saccharomyces boulardii (FLORASTOR) capsule 250 mg  250 mg Oral BID Juan Manuel Page MD        sennosides-docusate (PERICOLACE) 8.6-50 MG per tablet 1 tablet  1 tablet Oral Nightly Juan Manuel Page MD        sodium chloride 0.9 % flush 10 mL  10 mL Intravenous Q12H Juan Manuel Page MD        sodium chloride 0.9 % flush 10 mL  10 mL Intravenous PRN Juan Manuel Page MD        sucralfate (CARAFATE) tablet 1 g  1 g Oral TID AC Juan Manuel Page MD        tamsulosin (FLOMAX) 24 hr capsule 0.4 mg  0.4 mg Oral Daily Juan Manuel Page MD        valsartan (DIOVAN) tablet 40 mg  40 mg Oral Nightly Juan Manuel Page MD           Past Medical History:  Past Medical History:   Diagnosis Date    Arthritis     Autonomic disease     CAD (coronary artery disease) 02/06/2017    Cervical radiculopathy 09/16/2021    Chronic constipation with acute exaccerbation 05/10/2021    Coronary artery disease     Degeneration of cervical intervertebral disc 08/11/2021    Diabetes mellitus     Diabetic foot ulcer 08/31/2020    Diabetic polyneuropathy associated with type 2 diabetes mellitus 01/18/2021    Elevated cholesterol     Gastroesophageal reflux disease 05/13/2019    Headache     HTN (hypertension), benign 05/03/2017    Hyperlipidemia     Hypertension     Mixed hyperlipidemia 02/07/2017    Multiple lung nodules  01/26/2020    5mm, 9 mm RLL identified 1/2020, not present 10/2019.    Myocardial infarction     Osteomyelitis 01/22/2020    Osteomyelitis of fifth toe of right foot 10/07/2019    Pancreatitis     Persistent insomnia 01/20/2020    Renal disorder     Sleep apnea 02/06/2017    Sleep apnea with use of continuous positive airway pressure (CPAP)     NON-COMPLIANT    Slow transit constipation 01/16/2019    Spinal stenosis in cervical region 09/16/2021    Vitamin D deficiency 03/02/2021       Past Surgical History:  Past Surgical History:   Procedure Laterality Date    ABDOMINAL SURGERY      AMPUTATION FOOT / TOE Left 10/2021    5th digit     ANTERIOR CERVICAL DISCECTOMY W/ FUSION N/A 8/5/2022    Procedure: CERVICAL DISCECTOMY ANTERIOR WITH FUSION C5-6 with possible plating of C5-7 with neuromonitoring and 1 c-arm;  Surgeon: Karel Soliz MD;  Location:  [facility] OR;  Service: Neurosurgery;  Laterality: N/A;    APPENDECTOMY      BACK SURGERY      CARDIAC CATHETERIZATION Left 02/08/2021    Procedure: Left Heart Cath w poss intervention left anatomical snuff box acess;  Surgeon: Omkar Charles DO;  Location:  [facility] CATH INVASIVE LOCATION;  Service: Cardiology;  Laterality: Left;    CARDIAC SURGERY      CATARACT EXTRACTION      CERVICAL SPINE SURGERY      COLONOSCOPY N/A 01/31/2017    Normal exam repeat in 5 years    COLONOSCOPY N/A 02/11/2019    Mild acute inflammation    COLONOSCOPY W/ POLYPECTOMY  03/04/2014    Hyperplastic polyp    CORONARY ARTERY BYPASS GRAFT  10/2015    ENDOSCOPY  04/13/2011    Gastritis with hemorrhage    ENDOSCOPY N/A 05/05/2017    Normal exam    ENDOSCOPY N/A 02/11/2019    Gastritis    ENDOSCOPY N/A 09/01/2020    Non-erosive gastritis with hemorrhage    ENDOSCOPY N/A 02/10/2021    Esophagitis    FOOT SURGERY Left     INCISION AND DRAINAGE OF WOUND Left 09/2007    spider bite       Family History  Family History   Problem Relation Age of Onset    Colon cancer Father     Heart disease  Father     Colon cancer Sister     Colon polyps Sister     Alzheimer's disease Mother     Coronary artery disease Sister     Coronary artery disease Sister        Social History  Social History     Socioeconomic History    Marital status:    Tobacco Use    Smoking status: Former     Types: Cigarettes     Quit date:      Years since quittin.7    Smokeless tobacco: Never    Tobacco comments:     smoked in highschool   Vaping Use    Vaping Use: Never used   Substance and Sexual Activity    Alcohol use: No    Drug use: No    Sexual activity: Defer       Review of Systems:  History obtained from chart review and the patient  General ROS: No fever or chills  Respiratory ROS: No cough, shortness of breath, wheezing  Cardiovascular ROS: No chest pain or palpitations  Gastrointestinal ROS: No abdominal pain or melena  Genito-Urinary ROS: No dysuria or hematuria  Psych ROS: No anxiety and depression  14 point ROS reviewed with the patient and negative except as noted above and in the HPI unless unable to obtain.    Objective:  Blood pressure: 109/53 mmHg  Heart rate is 90 bpm  O2 saturation 100%.    General: awake/alert   HEENT: Normocephalic atraumatic head  Neck: Supple with no JVD carotid bruits.  Chest:  clear to auscultation bilaterally without respiratory distress  CVS: regular rate and rhythm  Abdominal: soft, nontender, positive bowel sounds  Extremities:  Left foot diabetic ulcer at plantar aspect  Skin: warm and dry without rash      Labs:  Results from last 7 days   Lab Units 10/09/23  0554 10/05/23  0502   WBC 10*3/mm3 7.81 6.98   HEMOGLOBIN g/dL 9.5* 8.8*   HEMATOCRIT % 32.0* 29.3*   PLATELETS 10*3/mm3 280 244         Results from last 7 days   Lab Units 10/09/23  0554 10/05/23  0502 10/04/23  0610   SODIUM mmol/L 140 140 137   POTASSIUM mmol/L 4.4 4.2 4.7   CHLORIDE mmol/L 96* 100 102   CO2 mmol/L 31.0* 28.0 21.0*   BUN mg/dL 46* 42* 40*   CREATININE mg/dL 3.15* 2.55* 2.54*   CALCIUM mg/dL 9.7  9.4 9.0   EGFR mL/min/1.73 20.8* 26.8* 27.0*   GLUCOSE mg/dL 181* 78 78       Radiology:   Imaging Results (Last 72 Hours)       Procedure Component Value Units Date/Time    XR Abdomen KUB [[id number]] Collected: 10/07/23 2124     Updated: 10/07/23 2128    Narrative:      EXAMINATION: XR ABDOMEN KUB-  10/7/2023 9:24 PM CDT     HISTORY: Acute vomiting.     FINDINGS: KUB radiograph demonstrates moderate constipation with a large  volume of stool throughout the colon. There is moderate gaseous  distention of the stomach. No free air.       Impression:      1.. Moderate constipation.  2. Moderate gaseous distention of the stomach.     This report was signed and finalized on 10/7/2023 9:25 PM CDT by Dr. Phillip Hart MD.               Culture:  No results found for: BLOODCX, URINECX, WOUNDCX, MRSACX, RESPCX, STOOLCX      Assessment   1.  Acute kidney injury/stage I.  2..Acute tubular necrosis  3.  Stage IIIb chronic kidney disease baseline.  3.  Status post debridement of diabetic foot ulcer  4.  Chronic diastolic CHF.  5.  Proteus wound infection  6.  Insulin-dependent type 2 diabetes.  7.  History of gastroparesis.  8.  Secondary hyperparathyroidism.  9.  Anemia of CKD.  10.  Type II diabetic nephropathy.    Plan:  1.  Hold diuretics.  2.  Continue wound care.  3.  Continue to monitor renal function closely.    Brian Lomas MD  10/9/2023  12:43 CDT

## 2023-10-09 NOTE — PROGRESS NOTES
[facility]  NIMESH Parkinson APRN        Internal Medicine Progress Note    10/9/2023   08:49 CDT    Name:  Erick Luong  MRN:    [id number]     Acct:     [id number]   Room:  [address] [place] [address] Day: 0     Admit Date: 9/18/2023  6:30 PM  PCP: Del Shetty MD    Subjective:     C/C: weakness, fatigue    Interval History: Status:  improved. Up to chair. No family at bedside.  Afebrile. Woke from sleep. Blood sugars stable. Pain well controlled at present time.  Bowels moved yesterday afternoon and patient feeling some better. Decline in renal function noted. Counts otherwise stable. Discharge planning underway.     Review of Systems   Constitutional: Positive for malaise/fatigue. Negative for chills, decreased appetite, weight gain and weight loss.   HENT:  Negative for congestion, ear discharge, hoarse voice and tinnitus.    Eyes:  Negative for blurred vision, discharge, visual disturbance and visual halos.   Cardiovascular:  Negative for chest pain, claudication, dyspnea on exertion, irregular heartbeat, leg swelling, orthopnea and paroxysmal nocturnal dyspnea.   Respiratory:  Negative for cough, shortness of breath, sputum production and wheezing.    Endocrine: Negative for cold intolerance, heat intolerance and polyuria.   Hematologic/Lymphatic: Negative for adenopathy. Does not bruise/bleed easily.   Skin:  Positive for poor wound healing. Negative for dry skin, itching and suspicious lesions.   Musculoskeletal:  Positive for neck pain. Negative for arthritis, back pain, falls, joint pain, muscle weakness and myalgias.   Gastrointestinal:  Negative for abdominal pain, constipation, diarrhea, dysphagia and hematemesis.   Genitourinary:  Negative for bladder incontinence, dysuria and frequency.   Neurological:  Positive for weakness. Negative for aphonia, disturbances in coordination and dizziness.   Psychiatric/Behavioral:  Negative for altered mental status, depression, memory loss and  "substance abuse. The patient does not have insomnia and is not nervous/anxious.          Medications:     Allergies:   Allergies   Allergen Reactions    Cefepime Hives and Anaphylaxis    Bactrim [Sulfamethoxazole-Trimethoprim] Other (See Comments)     \"RENAL FAILURE\"    Vancomycin Itching    Zolpidem Unknown - High Severity     \"makes him crazy\"    Zolpidem Tartrate Unknown - Low Severity and Provider Review Needed    Metronidazole Rash       Current Meds:   Current Facility-Administered Medications:     acetaminophen (TYLENOL) tablet 650 mg, 650 mg, Oral, Q4H PRN **OR** acetaminophen (TYLENOL) suppository 650 mg, 650 mg, Rectal, Q4H PRN, Juan Manuel Page MD    aluminum-magnesium hydroxide-simethicone (MAALOX MAX) 400-400-40 MG/5ML suspension 30 mL, 30 mL, Oral, Q4H PRN, Juan Manuel Page MD    apixaban (ELIQUIS) tablet 5 mg, 5 mg, Oral, BID With Meals, Juan Manuel Page MD    bumetanide (BUMEX) tablet 2 mg, 2 mg, Oral, BID, Willy Arteaga MD    busPIRone (BUSPAR) tablet 10 mg, 10 mg, Oral, Q12H PRN, Juan Manuel Page MD    calcitriol (ROCALTROL) capsule 0.5 mcg, 0.5 mcg, Oral, Daily, Juan Manuel Page MD    carvedilol (COREG) tablet 3.125 mg, 3.125 mg, Oral, BID With Meals, Juan Manuel Page MD    cefTRIAXone (ROCEPHIN) 1,000 mg in sodium chloride 0.9 % 100 mL IVPB, 1,000 mg, Intravenous, Q24H, Juan Manuel Page MD    cholecalciferol (VITAMIN D3) tablet 5,000 Units, 5,000 Units, Oral, Daily, Juan Manuel Page MD    dextrose (D50W) (25 g/50 mL) IV injection 25 g, 25 g, Intravenous, Q15 Min PRN, Juan Manuel Page MD    dextrose (GLUTOSE) oral gel 15 g, 15 g, Oral, Q15 Min PRN, Juan Manuel Page MD    docusate sodium (COLACE) capsule 200 mg, 200 mg, Oral, BID, Juan Manuel Page MD    donepezil (ARICEPT) tablet 10 mg, 10 mg, Oral, Daily, Juan Manuel Page MD    Dulaglutide solution pen-injector 4.5 mg ** PATIENT-SUPPLIED MEDICATION **, 4.5 mg, " Subcutaneous, Weekly, Juan Manuel Page MD    glucagon (GLUCAGEN) injection 1 mg, 1 mg, Intramuscular, Q15 Min PRN, Juan Manuel Page MD    HYDROmorphone (DILAUDID) injection 1 mg, 1 mg, Intravenous, Q6H PRN, Juan Manuel Page MD    insulin detemir (LEVEMIR) injection 30 Units, 30 Units, Subcutaneous, Daily, Juan Manuel Page MD    Insulin Lispro (humaLOG) injection 2-9 Units, 2-9 Units, Subcutaneous, 4x Daily AC & at Bedtime, Juan Manuel Page MD    isosorbide mononitrate (IMDUR) 24 hr tablet 30 mg, 30 mg, Oral, Q24H, Joanie Mednes APRN    Lidocaine HCl 4 % gel gel 1 application , 1 application , Topical, PRN, Juan Manuel Page MD    magnesium hydroxide (MILK OF MAGNESIA) 400 MG/5ML suspension 15 mL, 15 mL, Oral, Daily PRN, Juan Manuel Page MD    melatonin tablet 6 mg, 6 mg, Oral, Nightly, Shruthi Isabel APRN    methylnaltrexone (RELISTOR) injection 12 mg, 12 mg, Subcutaneous, Every Other Day, Shruthi Isabel APRN    metoclopramide (REGLAN) tablet 5 mg, 5 mg, Oral, TID AC, Juan Manuel Page MD    nitroglycerin (NITROSTAT) SL tablet 0.4 mg, 0.4 mg, Sublingual, Q5 Min PRN, Shruthi Isabel APRN    ondansetron (ZOFRAN) tablet 4 mg, 4 mg, Oral, Q6H PRN **OR** ondansetron (ZOFRAN) injection 4 mg, 4 mg, Intravenous, Q6H PRN, Juan Manuel Page MD    oxyCODONE (oxyCONTIN) 12 hr tablet 10 mg, 10 mg, Oral, Q12H, Juan Manuel Page MD    oxyCODONE (oxyCONTIN) 12 hr tablet 10 mg, 10 mg, Oral, Q12H, Juan Manuel Page MD    oxyCODONE (ROXICODONE) immediate release tablet 5 mg, 5 mg, Oral, Q4H PRN, Juan Manuel Page MD    pantoprazole (PROTONIX) EC tablet 40 mg, 40 mg, Oral, BID, Juan Manuel Page MD    polyethylene glycol (MIRALAX) packet 17 g, 17 g, Oral, Daily, Juan Manuel Page MD    pregabalin (LYRICA) capsule 100 mg, 100 mg, Oral, Daily, Juan Manuel Page MD    pregabalin (LYRICA) capsule 200 mg, 200 mg, Oral, Nightly, [facility]  Juan Manuel Dillon MD    rosuvastatin (CRESTOR) tablet 40 mg, 40 mg, Oral, Nightly, Juan Manuel Page MD    saccharomyces boulardii (FLORASTOR) capsule 250 mg, 250 mg, Oral, BID, Juan Manuel Page MD    sennosides-docusate (PERICOLACE) 8.6-50 MG per tablet 1 tablet, 1 tablet, Oral, Nightly, Juan Manuel Page MD    sodium chloride 0.9 % flush 10 mL, 10 mL, Intravenous, Q12H, Juan Manuel Page MD    sodium chloride 0.9 % flush 10 mL, 10 mL, Intravenous, PRN, Juan Manuel Page MD    sucralfate (CARAFATE) tablet 1 g, 1 g, Oral, TID AC, Juan Manuel Page MD    tamsulosin (FLOMAX) 24 hr capsule 0.4 mg, 0.4 mg, Oral, Daily, Juan Manuel Page MD    valsartan (DIOVAN) tablet 40 mg, 40 mg, Oral, Nightly, Juan Manuel Page MD    Data:     Code Status:    There are no questions and answers to display.       Family History   Problem Relation Age of Onset    Colon cancer Father     Heart disease Father     Colon cancer Sister     Colon polyps Sister     Alzheimer's disease Mother     Coronary artery disease Sister     Coronary artery disease Sister        Social History     Socioeconomic History    Marital status:    Tobacco Use    Smoking status: Former     Types: Cigarettes     Quit date:      Years since quittin.7    Smokeless tobacco: Never    Tobacco comments:     smoked in [employer]   Vaping Use    Vaping Use: Never used   Substance and Sexual Activity    Alcohol use: No    Drug use: No    Sexual activity: Defer       Vitals:  T 97.6 P 74 R 14 Bp 109/53 Sp02 99% (room air)            I/O (24Hr):  No intake or output data in the 24 hours ending 10/09/23 0849    Labs and imaging:      Recent Results (from the past 12 hour(s))   Basic Metabolic Panel    Collection Time: 10/09/23  5:54 AM    Specimen: Blood   Result Value Ref Range    Glucose 181 (H) 65 - 99 mg/dL    BUN 46 (H) 8 - 23 mg/dL    Creatinine 3.15 (H) 0.76 - 1.27 mg/dL    Sodium 140 136 - 145 mmol/L    Potassium  4.4 3.5 - 5.2 mmol/L    Chloride 96 (L) 98 - 107 mmol/L    CO2 31.0 (H) 22.0 - 29.0 mmol/L    Calcium 9.7 8.6 - 10.5 mg/dL    BUN/Creatinine Ratio 14.6 7.0 - 25.0    Anion Gap 13.0 5.0 - 15.0 mmol/L    eGFR 20.8 (L) >60.0 mL/min/1.73   Sedimentation Rate    Collection Time: 10/09/23  5:54 AM    Specimen: Blood   Result Value Ref Range    Sed Rate 31 (H) 0 - 20 mm/hr   C-reactive Protein    Collection Time: 10/09/23  5:54 AM    Specimen: Blood   Result Value Ref Range    C-Reactive Protein 2.75 (H) 0.00 - 0.50 mg/dL   CBC Auto Differential    Collection Time: 10/09/23  5:54 AM    Specimen: Blood   Result Value Ref Range    WBC 7.81 3.40 - 10.80 10*3/mm3    RBC 3.56 (L) 4.14 - 5.80 10*6/mm3    Hemoglobin 9.5 (L) 13.0 - 17.7 g/dL    Hematocrit 32.0 (L) 37.5 - 51.0 %    MCV 89.9 79.0 - 97.0 fL    MCH 26.7 26.6 - 33.0 pg    MCHC 29.7 (L) 31.5 - 35.7 g/dL    RDW 15.6 (H) 12.3 - 15.4 %    RDW-SD 51.3 37.0 - 54.0 fl    MPV 11.8 6.0 - 12.0 fL    Platelets 280 140 - 450 10*3/mm3    Neutrophil % 60.6 42.7 - 76.0 %    Lymphocyte % 20.4 19.6 - 45.3 %    Monocyte % 11.3 5.0 - 12.0 %    Eosinophil % 6.5 (H) 0.3 - 6.2 %    Basophil % 0.9 0.0 - 1.5 %    Immature Grans % 0.3 0.0 - 0.5 %    Neutrophils, Absolute 4.74 1.70 - 7.00 10*3/mm3    Lymphocytes, Absolute 1.59 0.70 - 3.10 10*3/mm3    Monocytes, Absolute 0.88 0.10 - 0.90 10*3/mm3    Eosinophils, Absolute 0.51 (H) 0.00 - 0.40 10*3/mm3    Basophils, Absolute 0.07 0.00 - 0.20 10*3/mm3    Immature Grans, Absolute 0.02 0.00 - 0.05 10*3/mm3    nRBC 0.0 0.0 - 0.2 /100 WBC   POC Glucose Once    Collection Time: 10/09/23  7:05 AM    Specimen: Blood   Result Value Ref Range    Glucose 212 (H) 70 - 130 mg/dL                               Physical Examination:        Physical Exam  Vitals and nursing note reviewed.   Constitutional:       Appearance: Normal appearance. He is well-developed. He is obese.   HENT:      Head: Normocephalic and atraumatic.      Right Ear: External ear normal.       Left Ear: External ear normal.      Nose: Nose normal.      Mouth/Throat:      Mouth: Mucous membranes are moist.      Pharynx: Oropharynx is clear.   Eyes:      Pupils: Pupils are equal, round, and reactive to light.   Cardiovascular:      Rate and Rhythm: Normal rate. Rhythm irregular.      Heart sounds: Normal heart sounds.   Pulmonary:      Effort: Pulmonary effort is normal.      Breath sounds: Normal breath sounds.   Abdominal:      General: Bowel sounds are normal.      Palpations: Abdomen is soft.      Comments: obese   Musculoskeletal:         General: Normal range of motion.      Cervical back: Normal range of motion and neck supple.      Comments: Generalized weakness   Skin:     General: Skin is warm and dry.      Comments: Wound vac intact   Neurological:      Mental Status: He is alert and oriented to person, place, and time.      Deep Tendon Reflexes: Reflexes are normal and symmetric.   Psychiatric:         Behavior: Behavior normal.           Assessment:            * No active hospital problems. *    Past Medical History:   Diagnosis Date    Arthritis     Autonomic disease     CAD (coronary artery disease) 02/06/2017    Cervical radiculopathy 09/16/2021    Chronic constipation with acute exaccerbation 05/10/2021    Coronary artery disease     Degeneration of cervical intervertebral disc 08/11/2021    Diabetes mellitus     Diabetic foot ulcer 08/31/2020    Diabetic polyneuropathy associated with type 2 diabetes mellitus 01/18/2021    Elevated cholesterol     Gastroesophageal reflux disease 05/13/2019    Headache     HTN (hypertension), benign 05/03/2017    Hyperlipidemia     Hypertension     Mixed hyperlipidemia 02/07/2017    Multiple lung nodules 01/26/2020    5mm, 9 mm RLL identified 1/2020, not present 10/2019.    Myocardial infarction     Osteomyelitis 01/22/2020    Osteomyelitis of fifth toe of right foot 10/07/2019    Pancreatitis     Persistent insomnia 01/20/2020    Renal disorder     Sleep  apnea 02/06/2017    Sleep apnea with use of continuous positive airway pressure (CPAP)     NON-COMPLIANT    Slow transit constipation 01/16/2019    Spinal stenosis in cervical region 09/16/2021    Vitamin D deficiency 03/02/2021        Plan:        Proteus wound infection s/p debridement  New onset atrial fibrillation  Acute on chronic diastolic CHF  CKD3  Multifactorial anemia  DM2 with hyperglycemia on long term insulin  BRITTNI  Chronic pain syndrome  Chronic neck pain with radiculopathy  Diabetic peripheral neuropathy  Hx CAD  GERD  Possible gastroparesis  Sepsis secondary to wound infection  Continue current treatment. Monitor counts. Increase activity. Labs Thursday. Wound care per wound care team. Continue IV antibiotics. Appreciate nephrology evaluation and treatment. Continue glycemic control efforts. Maintain patient safety. Continue pain control efforts. Continue bowel regimen.  Discharge planning.       Electronically signed by SUSAN Muñoz on 10/9/2023 at 08:49 CDT

## 2023-10-10 ENCOUNTER — APPOINTMENT (OUTPATIENT)
Dept: CT IMAGING | Facility: HOSPITAL | Age: 68
End: 2023-10-10
Payer: COMMERCIAL

## 2023-10-10 ENCOUNTER — APPOINTMENT (OUTPATIENT)
Dept: GENERAL RADIOLOGY | Facility: HOSPITAL | Age: 68
End: 2023-10-10
Payer: COMMERCIAL

## 2023-10-10 LAB
ANION GAP SERPL CALCULATED.3IONS-SCNC: 12 MMOL/L (ref 5–15)
BUN SERPL-MCNC: 46 MG/DL (ref 8–23)
BUN/CREAT SERPL: 13.2 (ref 7–25)
CALCIUM SPEC-SCNC: 9.6 MG/DL (ref 8.6–10.5)
CHLORIDE SERPL-SCNC: 99 MMOL/L (ref 98–107)
CO2 SERPL-SCNC: 33 MMOL/L (ref 22–29)
CREAT SERPL-MCNC: 3.49 MG/DL (ref 0.76–1.27)
EGFRCR SERPLBLD CKD-EPI 2021: 18.4 ML/MIN/1.73
GLUCOSE BLDC GLUCOMTR-MCNC: 125 MG/DL (ref 70–130)
GLUCOSE BLDC GLUCOMTR-MCNC: 188 MG/DL (ref 70–130)
GLUCOSE BLDC GLUCOMTR-MCNC: 203 MG/DL (ref 70–130)
GLUCOSE BLDC GLUCOMTR-MCNC: 84 MG/DL (ref 70–130)
GLUCOSE SERPL-MCNC: 134 MG/DL (ref 65–99)
POTASSIUM SERPL-SCNC: 4.8 MMOL/L (ref 3.5–5.2)
SODIUM SERPL-SCNC: 144 MMOL/L (ref 136–145)

## 2023-10-10 PROCEDURE — 82948 REAGENT STRIP/BLOOD GLUCOSE: CPT

## 2023-10-10 PROCEDURE — 80048 BASIC METABOLIC PNL TOTAL CA: CPT | Performed by: INTERNAL MEDICINE

## 2023-10-10 PROCEDURE — 72125 CT NECK SPINE W/O DYE: CPT

## 2023-10-10 PROCEDURE — 73030 X-RAY EXAM OF SHOULDER: CPT

## 2023-10-10 PROCEDURE — 63710000001 INSULIN LISPRO (HUMAN) PER 5 UNITS: Performed by: INTERNAL MEDICINE

## 2023-10-10 PROCEDURE — 63710000001 INSULIN DETEMIR PER 5 UNITS: Performed by: INTERNAL MEDICINE

## 2023-10-10 PROCEDURE — 70450 CT HEAD/BRAIN W/O DYE: CPT

## 2023-10-10 PROCEDURE — 97110 THERAPEUTIC EXERCISES: CPT

## 2023-10-10 PROCEDURE — 25010000002 ONDANSETRON PER 1 MG: Performed by: INTERNAL MEDICINE

## 2023-10-10 PROCEDURE — 25810000003 SODIUM CHLORIDE 0.9 % SOLUTION: Performed by: INTERNAL MEDICINE

## 2023-10-10 PROCEDURE — 25010000002 HYDROMORPHONE 1 MG/ML SOLUTION: Performed by: INTERNAL MEDICINE

## 2023-10-10 PROCEDURE — 25010000002 CEFTRIAXONE PER 250 MG: Performed by: INTERNAL MEDICINE

## 2023-10-10 PROCEDURE — 92507 TX SP LANG VOICE COMM INDIV: CPT | Performed by: SPEECH-LANGUAGE PATHOLOGIST

## 2023-10-10 PROCEDURE — 63710000001 ONDANSETRON PER 8 MG: Performed by: INTERNAL MEDICINE

## 2023-10-10 RX ORDER — TIMOLOL MALEATE 5 MG/ML
1 SOLUTION/ DROPS OPHTHALMIC EVERY 12 HOURS SCHEDULED
Status: DISCONTINUED | OUTPATIENT
Start: 2023-10-10 | End: 2023-10-11 | Stop reason: HOSPADM

## 2023-10-10 RX ORDER — OXYCODONE HCL 10 MG/1
10 TABLET, FILM COATED, EXTENDED RELEASE ORAL
Status: DISCONTINUED | OUTPATIENT
Start: 2023-10-11 | End: 2023-10-11 | Stop reason: HOSPADM

## 2023-10-10 RX ORDER — OXYCODONE HYDROCHLORIDE 5 MG/1
5 TABLET ORAL EVERY 8 HOURS PRN
Status: DISCONTINUED | OUTPATIENT
Start: 2023-10-10 | End: 2023-10-11 | Stop reason: HOSPADM

## 2023-10-10 RX ORDER — ROSUVASTATIN CALCIUM 10 MG/1
10 TABLET, COATED ORAL NIGHTLY
Status: DISCONTINUED | OUTPATIENT
Start: 2023-10-11 | End: 2023-10-11 | Stop reason: HOSPADM

## 2023-10-10 RX ORDER — PREGABALIN 100 MG/1
100 CAPSULE ORAL NIGHTLY
Status: DISCONTINUED | OUTPATIENT
Start: 2023-10-10 | End: 2023-10-11 | Stop reason: HOSPADM

## 2023-10-10 RX ORDER — PREGABALIN 25 MG/1
50 CAPSULE ORAL DAILY
Status: DISCONTINUED | OUTPATIENT
Start: 2023-10-10 | End: 2023-10-11 | Stop reason: HOSPADM

## 2023-10-10 RX ORDER — MIDODRINE HYDROCHLORIDE 5 MG/1
2.5 TABLET ORAL EVERY 8 HOURS
Status: DISCONTINUED | OUTPATIENT
Start: 2023-10-10 | End: 2023-10-11 | Stop reason: HOSPADM

## 2023-10-10 NOTE — PROGRESS NOTES
All year round:  1. Refresh Liquigel is an over-the-counter artificial tear.  One drop in each eye at bedtime and 1- 2 times daily.     For persistent itching  2. Pataday is an antihistamine. One drop in each eye 1-2 times daily for itching    Adult Nutrition  Assessment/PES    Patient Name:  Erick Luong  YOB: 1955  MRN: [id number]  Admit Date:  9/18/2023    Assessment Date:  10/10/2023       Reason for Assessment       Row Name 10/10/23 0958          Reason for Assessment    Reason For Assessment follow-up protocol     Diagnosis cardiac disease;infection/sepsis     Identified At Risk by Screening Criteria large or nonhealing wound, burn or pressure injury                    Nutrition/Diet History       Row Name 10/10/23 0948          Nutrition/Diet History    Typical Intake (Food/Fluid/EN/PN) CT today s/p fall. Glu ranges . Wound vac left heel continues. PO excellent. Wt 296lb.                    Labs/Tests/Procedures/Meds       Row Name 10/10/23 0950          Labs/Procedures/Meds    Lab Results Reviewed reviewed     Lab Results Comments Glu         Diagnostic Tests/Procedures    Diagnostic Test/Procedure Reviewed reviewed        Medications    Pertinent Medications Reviewed reviewed     Pertinent Medications Comments See MAR                    Physical Findings       Row Name 10/10/23 0950          Physical Findings    Overall Physical Appearance Room air, generalized edema, BM 10/9, left heel wound vac, left plantar food wound.                    Estimated/Assessed Needs - Anthropometrics       Row Name 10/10/23 0950          Anthropometrics    Weight for Calculation 134 kg (296 lb)        Estimated/Assessed Needs    Additional Documentation Fluid Requirements (Group);KCAL/KG (Group);Protein Requirements (Group)        KCAL/KG    KCAL/KG 15 Kcal/Kg (kcal);14 Kcal/Kg (kcal)     14 Kcal/Kg (kcal) 1879.71     15 Kcal/Kg (kcal) 2013.975        Protein Requirements    Weight Used For Protein Calculations 80.7 kg (178 lb)  IBW     Est Protein Requirement Amount (gms/kg) 1.3 gm protein     Estimated Protein Requirements (gms/day) 104.96        Fluid Requirements    Fluid Requirements (mL/day) 2014     RDA Method (mL) 2014                     Nutrition Prescription Ordered       Row Name 10/10/23 0951          Nutrition Prescription PO    Current PO Diet Regular     Fluid Consistency Thin     Common Modifiers Cardiac;Consistent Carbohydrate                    Evaluation of Received Nutrient/Fluid Intake       Row Name 10/10/23 0951          Nutrient/Fluid Evaluation    Number of Days Evaluated 3 days        Fluid Intake Evaluation    Oral Fluid (mL) 920        PO Evaluation    Number of Days PO Intake Evaluated 3 days     Number of Meals 6     % PO Intake 92; 0% x 2 meals in 72-hr review                       Problem/Interventions:     Problem 2       Row Name 10/10/23 0951          Nutrition Diagnoses Problem 2    Problem 2 Nutrition Appropriate for Condition at this Time                        Intervention Goal       Row Name 10/10/23 0951          Intervention Goal    General Disease management/therapy;Meet nutritional needs for age/condition;Improved nutrition related lab(s)     PO Meet estimated needs;Continue positive trend     Weight No significant weight loss                    Nutrition Intervention       Row Name 10/10/23 0951          Nutrition Intervention    RD/Tech Action Follow Tx progress;Care plan reviewd                    Nutrition Prescription       Row Name 10/10/23 0951          Nutrition Prescription PO    PO Prescription Other (comment)  continue same protocol                    Education/Evaluation       Row Name 10/10/23 0952          Education    Education No discharge needs identified at this time        Monitor/Evaluation    Monitor Per protocol                     Electronically signed by:  Jie Green RDN, LD  10/10/23 09:52 CDT

## 2023-10-10 NOTE — PROGRESS NOTES
[facility]  NIMESH Parkinson APRN        Internal Medicine Progress Note    10/10/2023   10:42 CDT    Name:  Erick Luong  MRN:    [id number]     Acct:     [id number]   Room:  [address] [place] [address] Day: 0     Admit Date: 9/18/2023  6:30 PM  PCP: Del Shetty MD    Subjective:     C/C: weakness, fatigue    Interval History: Status:  improved. Up to chair. Wife at bedside.  Afebrile. Undergoing wound care. Blood sugars stable. Pain well controlled at present time.  Bowels moving. Suffered a fall early this morning and reported increased right shoulder pain and head pain to wife. Continued decline in renal function noted. Counts otherwise stable. Discharge planning underway.     Review of Systems   Constitutional: Positive for malaise/fatigue. Negative for chills, decreased appetite, weight gain and weight loss.   HENT:  Negative for congestion, ear discharge, hoarse voice and tinnitus.    Eyes:  Negative for blurred vision, discharge, visual disturbance and visual halos.   Cardiovascular:  Negative for chest pain, claudication, dyspnea on exertion, irregular heartbeat, leg swelling, orthopnea and paroxysmal nocturnal dyspnea.   Respiratory:  Negative for cough, shortness of breath, sputum production and wheezing.    Endocrine: Negative for cold intolerance, heat intolerance and polyuria.   Hematologic/Lymphatic: Negative for adenopathy. Does not bruise/bleed easily.   Skin:  Positive for poor wound healing. Negative for dry skin, itching and suspicious lesions.   Musculoskeletal:  Positive for neck pain. Negative for arthritis, back pain, falls, joint pain, muscle weakness and myalgias.   Gastrointestinal:  Negative for abdominal pain, constipation, diarrhea, dysphagia and hematemesis.   Genitourinary:  Negative for bladder incontinence, dysuria and frequency.   Neurological:  Positive for weakness. Negative for aphonia, disturbances in coordination and dizziness.   Psychiatric/Behavioral:   "Negative for altered mental status, depression, memory loss and substance abuse. The patient does not have insomnia and is not nervous/anxious.          Medications:     Allergies:   Allergies   Allergen Reactions    Cefepime Hives and Anaphylaxis    Bactrim [Sulfamethoxazole-Trimethoprim] Other (See Comments)     \"RENAL FAILURE\"    Vancomycin Itching    Zolpidem Unknown - High Severity     \"makes him crazy\"    Zolpidem Tartrate Unknown - Low Severity and Provider Review Needed    Metronidazole Rash       Current Meds:   Current Facility-Administered Medications:     acetaminophen (TYLENOL) tablet 650 mg, 650 mg, Oral, Q4H PRN **OR** acetaminophen (TYLENOL) suppository 650 mg, 650 mg, Rectal, Q4H PRN, Juan Manuel Page MD    aluminum-magnesium hydroxide-simethicone (MAALOX MAX) 400-400-40 MG/5ML suspension 30 mL, 30 mL, Oral, Q4H PRN, Juan Manuel Page MD    apixaban (ELIQUIS) tablet 5 mg, 5 mg, Oral, BID With Meals, Juan Manuel Page MD    bumetanide (BUMEX) tablet 1 mg, 1 mg, Oral, BID, Brian Lomas MD    busPIRone (BUSPAR) tablet 10 mg, 10 mg, Oral, Q12H PRN, Juan Manuel Page MD    calcitriol (ROCALTROL) capsule 0.5 mcg, 0.5 mcg, Oral, Daily, Juan Manuel Page MD    carvedilol (COREG) tablet 3.125 mg, 3.125 mg, Oral, BID With Meals, Juan Manuel Page MD    cefTRIAXone (ROCEPHIN) 1,000 mg in sodium chloride 0.9 % 100 mL IVPB, 1,000 mg, Intravenous, Q24H, Juan Manuel Page MD    cholecalciferol (VITAMIN D3) tablet 5,000 Units, 5,000 Units, Oral, Daily, Juan Manuel Page MD    dextrose (D50W) (25 g/50 mL) IV injection 25 g, 25 g, Intravenous, Q15 Min PRN, Juan Manuel Page MD    dextrose (GLUTOSE) oral gel 15 g, 15 g, Oral, Q15 Min PRN, Juan Manuel Page MD    docusate sodium (COLACE) capsule 200 mg, 200 mg, Oral, BID, Juan Manuel Page MD    donepezil (ARICEPT) tablet 10 mg, 10 mg, Oral, Daily, Juan Manuel Page MD    Dulaglutide solution pen-injector " 4.5 mg ** PATIENT-SUPPLIED MEDICATION **, 4.5 mg, Subcutaneous, Weekly, Juan Manuel Page MD    glucagon (GLUCAGEN) injection 1 mg, 1 mg, Intramuscular, Q15 Min PRN, Juan Manuel Page MD    HYDROmorphone (DILAUDID) injection 1 mg, 1 mg, Intravenous, Q8H PRN, Juan Manuel Page MD    insulin detemir (LEVEMIR) injection 30 Units, 30 Units, Subcutaneous, Daily, Juan Manuel Pgae MD    Insulin Lispro (humaLOG) injection 2-9 Units, 2-9 Units, Subcutaneous, 4x Daily AC & at Bedtime, Juan Manuel Page MD    isosorbide mononitrate (IMDUR) 24 hr tablet 30 mg, 30 mg, Oral, Q24H, Joanie Mendes APRN    Lidocaine HCl 4 % gel gel 1 application , 1 application , Topical, PRN, Juan Manuel Page MD    magnesium hydroxide (MILK OF MAGNESIA) 400 MG/5ML suspension 15 mL, 15 mL, Oral, Daily PRN, Juan Manuel Page MD    melatonin tablet 6 mg, 6 mg, Oral, Nightly, Shruthi Isable APRN    metoclopramide (REGLAN) tablet 5 mg, 5 mg, Oral, TID AC, Juan Manuel Page MD    nitroglycerin (NITROSTAT) SL tablet 0.4 mg, 0.4 mg, Sublingual, Q5 Min PRN, Shruthi Isabel APRN    ondansetron (ZOFRAN) tablet 4 mg, 4 mg, Oral, Q6H PRN **OR** ondansetron (ZOFRAN) injection 4 mg, 4 mg, Intravenous, Q6H PRN, Juan Manuel Page MD    [START ON 10/11/2023] oxyCODONE (oxyCONTIN) 12 hr tablet 10 mg, 10 mg, Oral, Q24H, Juan Manuel Page MD    oxyCODONE (ROXICODONE) immediate release tablet 5 mg, 5 mg, Oral, Q8H PRN, Juan Manuel Page MD    pantoprazole (PROTONIX) EC tablet 40 mg, 40 mg, Oral, BID, Juan Manuel Page MD    polyethylene glycol (MIRALAX) packet 17 g, 17 g, Oral, Daily, Juan Manuel Page MD    pregabalin (LYRICA) capsule 100 mg, 100 mg, Oral, Nightly, Juan Manuel Page MD    pregabalin (LYRICA) capsule 50 mg, 50 mg, Oral, Daily, Juan Manuel Page MD    [START ON 10/11/2023] rosuvastatin (CRESTOR) tablet 10 mg, 10 mg, Oral, Nightly, Juan Manuel Page MD     saccharomyces boulardii (FLORASTOR) capsule 250 mg, 250 mg, Oral, BID, Juan Manuel Page MD    sennosides-docusate (PERICOLACE) 8.6-50 MG per tablet 1 tablet, 1 tablet, Oral, Nightly, Juan Manuel Page MD    sodium chloride 0.9 % flush 10 mL, 10 mL, Intravenous, Q12H, Juan Manuel Page MD    sodium chloride 0.9 % flush 10 mL, 10 mL, Intravenous, PRN, Juan Manuel Page MD    sucralfate (CARAFATE) tablet 1 g, 1 g, Oral, TID AC, Juan Manuel Page MD    tamsulosin (FLOMAX) 24 hr capsule 0.4 mg, 0.4 mg, Oral, Daily, Juan Manuel Page MD    valsartan (DIOVAN) tablet 40 mg, 40 mg, Oral, Nightly, Juan Manuel Page MD    Data:     Code Status:    There are no questions and answers to display.       Family History   Problem Relation Age of Onset    Colon cancer Father     Heart disease Father     Colon cancer Sister     Colon polyps Sister     Alzheimer's disease Mother     Coronary artery disease Sister     Coronary artery disease Sister        Social History     Socioeconomic History    Marital status:    Tobacco Use    Smoking status: Former     Types: Cigarettes     Quit date:      Years since quittin.7    Smokeless tobacco: Never    Tobacco comments:     smoked in [employer]   Vaping Use    Vaping Use: Never used   Substance and Sexual Activity    Alcohol use: No    Drug use: No    Sexual activity: Defer       Vitals:  T 97.8 P 76 R 16 Bp 92/63 Sp02 97% (room air)            I/O (24Hr):  No intake or output data in the 24 hours ending 10/10/23 1042    Labs and imaging:      Recent Results (from the past 12 hour(s))   Basic Metabolic Panel    Collection Time: 10/10/23  7:01 AM    Specimen: Blood   Result Value Ref Range    Glucose 134 (H) 65 - 99 mg/dL    BUN 46 (H) 8 - 23 mg/dL    Creatinine 3.49 (H) 0.76 - 1.27 mg/dL    Sodium 144 136 - 145 mmol/L    Potassium 4.8 3.5 - 5.2 mmol/L    Chloride 99 98 - 107 mmol/L    CO2 33.0 (H) 22.0 - 29.0 mmol/L    Calcium 9.6 8.6 -  10.5 mg/dL    BUN/Creatinine Ratio 13.2 7.0 - 25.0    Anion Gap 12.0 5.0 - 15.0 mmol/L    eGFR 18.4 (L) >60.0 mL/min/1.73   POC Glucose Once    Collection Time: 10/10/23  7:13 AM    Specimen: Blood   Result Value Ref Range    Glucose 125 70 - 130 mg/dL                               Physical Examination:        Physical Exam  Vitals and nursing note reviewed.   Constitutional:       Appearance: Normal appearance. He is well-developed. He is obese.   HENT:      Head: Normocephalic and atraumatic.      Right Ear: External ear normal.      Left Ear: External ear normal.      Nose: Nose normal.      Mouth/Throat:      Mouth: Mucous membranes are moist.      Pharynx: Oropharynx is clear.   Eyes:      Pupils: Pupils are equal, round, and reactive to light.   Cardiovascular:      Rate and Rhythm: Normal rate. Rhythm irregular.      Heart sounds: Normal heart sounds.   Pulmonary:      Effort: Pulmonary effort is normal.      Breath sounds: Normal breath sounds.   Abdominal:      General: Bowel sounds are normal.      Palpations: Abdomen is soft.      Comments: obese   Musculoskeletal:         General: Normal range of motion.      Cervical back: Normal range of motion and neck supple.      Comments: Generalized weakness   Skin:     General: Skin is warm and dry.      Comments: Wound vac intact   Neurological:      Mental Status: He is alert and oriented to person, place, and time.      Deep Tendon Reflexes: Reflexes are normal and symmetric.   Psychiatric:         Behavior: Behavior normal.           Assessment:            * No active hospital problems. *    Past Medical History:   Diagnosis Date    Arthritis     Autonomic disease     CAD (coronary artery disease) 02/06/2017    Cervical radiculopathy 09/16/2021    Chronic constipation with acute exaccerbation 05/10/2021    Coronary artery disease     Degeneration of cervical intervertebral disc 08/11/2021    Diabetes mellitus     Diabetic foot ulcer 08/31/2020    Diabetic  polyneuropathy associated with type 2 diabetes mellitus 01/18/2021    Elevated cholesterol     Gastroesophageal reflux disease 05/13/2019    Headache     HTN (hypertension), benign 05/03/2017    Hyperlipidemia     Hypertension     Mixed hyperlipidemia 02/07/2017    Multiple lung nodules 01/26/2020    5mm, 9 mm RLL identified 1/2020, not present 10/2019.    Myocardial infarction     Osteomyelitis 01/22/2020    Osteomyelitis of fifth toe of right foot 10/07/2019    Pancreatitis     Persistent insomnia 01/20/2020    Renal disorder     Sleep apnea 02/06/2017    Sleep apnea with use of continuous positive airway pressure (CPAP)     NON-COMPLIANT    Slow transit constipation 01/16/2019    Spinal stenosis in cervical region 09/16/2021    Vitamin D deficiency 03/02/2021        Plan:        Proteus wound infection s/p debridement  New onset atrial fibrillation  Acute on chronic diastolic CHF  CKD3  Multifactorial anemia  DM2 with hyperglycemia on long term insulin  BRITTNI  Chronic pain syndrome  Chronic neck pain with radiculopathy  Diabetic peripheral neuropathy  Hx CAD  GERD  Possible gastroparesis  Sepsis secondary to wound infection  Continue current treatment. Monitor counts. Increase activity. Labs Thursday. Wound care per wound care team. Continue IV antibiotics. Appreciate nephrology evaluation and treatment. Continue glycemic control efforts. Maintain patient safety. Continue pain control efforts. Continue bowel regimen.  Discharge planning.       Electronically signed by SUSAN Muñoz on 10/10/2023 at 10:42 CDT

## 2023-10-10 NOTE — PROGRESS NOTES
[facility] Fall Assessment Note    Erick Luong is a 68 y.o.male  [Ht:  ; Wt: 134 kg (296 lb)] admitted 9/18/2023  6:30 PM.    Current medications associated with an increased risk for fall include:      aluminum-magnesium hydroxide-simethicone (MAALOX MAX) 400-400-40 MG/5ML suspension 30 mL, 30 mL, Oral, Q4H PRN    bumetanide (BUMEX) tablet 1 mg, 1 mg, Oral, BID    busPIRone (BUSPAR) tablet 10 mg, 10 mg, Oral, Q12H PRN    carvedilol (COREG) tablet 3.125 mg, 3.125 mg, Oral, BID With Meals    docusate sodium (COLACE) capsule 200 mg, 200 mg, Oral, BID     donepezil (ARICEPT) tablet 10 mg, 10 mg, Oral, Daily    Dulaglutide solution pen-injector 4.5 mg ** PATIENT-SUPPLIED MEDICATION **, 4.5 mg, Subcutaneous, Weekly    HYDROmorphone (DILAUDID) injection 1 mg, 1 mg, Intravenous, Q6H PRN    insulin detemir (LEVEMIR) injection 30 Units, 30 Units, Subcutaneous, Daily    Insulin Lispro (humaLOG) injection 2-9 Units, 2-9 Units, Subcutaneous, 4x Daily AC & at Bedtime    magnesium hydroxide (MILK OF MAGNESIA) 400 MG/5ML suspension 15 mL, 15 mL, Oral, Daily PRN    melatonin tablet 6 mg, 6 mg, Oral, Nightly    metoclopramide (REGLAN) tablet 5 mg, 5 mg, Oral, TID AC    ondansetron (ZOFRAN) tablet 4 mg, 4 mg, Oral, Q6H PRN **OR** ondansetron (ZOFRAN) injection 4 mg, 4 mg, Intravenous, Q6H PRN    oxyCODONE (oxyCONTIN) 12 hr tablet 10 mg, 10 mg, Oral, Q12H    oxyCODONE (ROXICODONE) immediate release tablet 5 mg, 5 mg, Oral, Q4H PRN    polyethylene glycol (MIRALAX) packet 17 g, 17 g, Oral, Daily    pregabalin (LYRICA) capsule 100 mg, 100 mg, Oral, Daily    pregabalin (LYRICA) capsule 200 mg, 200 mg, Oral, Nightly    sennosides-docusate (PERICOLACE) 8.6-50 MG per tablet 1 tablet, 1 tablet, Oral, Nightly    tamsulosin (FLOMAX) 24 hr capsule 0.4 mg, 0.4 mg, Oral, Daily    valsartan (DIOVAN) tablet 40 mg, 40 mg, Oral, Nightly      Fernando Bar, PharmD  10/10/2304:33 CDT

## 2023-10-11 LAB
ANION GAP SERPL CALCULATED.3IONS-SCNC: 11 MMOL/L (ref 5–15)
BASOPHILS # BLD AUTO: 0.04 10*3/MM3 (ref 0–0.2)
BASOPHILS NFR BLD AUTO: 0.6 % (ref 0–1.5)
BUN SERPL-MCNC: 50 MG/DL (ref 8–23)
BUN/CREAT SERPL: 16.2 (ref 7–25)
CALCIUM SPEC-SCNC: 9.4 MG/DL (ref 8.6–10.5)
CHLORIDE SERPL-SCNC: 99 MMOL/L (ref 98–107)
CO2 SERPL-SCNC: 33 MMOL/L (ref 22–29)
CREAT SERPL-MCNC: 3.08 MG/DL (ref 0.76–1.27)
DEPRECATED RDW RBC AUTO: 51.3 FL (ref 37–54)
EGFRCR SERPLBLD CKD-EPI 2021: 21.4 ML/MIN/1.73
EOSINOPHIL # BLD AUTO: 0.49 10*3/MM3 (ref 0–0.4)
EOSINOPHIL NFR BLD AUTO: 7 % (ref 0.3–6.2)
ERYTHROCYTE [DISTWIDTH] IN BLOOD BY AUTOMATED COUNT: 15.2 % (ref 12.3–15.4)
GLUCOSE BLDC GLUCOMTR-MCNC: 101 MG/DL (ref 70–130)
GLUCOSE BLDC GLUCOMTR-MCNC: 161 MG/DL (ref 70–130)
GLUCOSE BLDC GLUCOMTR-MCNC: 195 MG/DL (ref 70–130)
GLUCOSE SERPL-MCNC: 146 MG/DL (ref 65–99)
HCT VFR BLD AUTO: 32.4 % (ref 37.5–51)
HGB BLD-MCNC: 9.7 G/DL (ref 13–17.7)
IMM GRANULOCYTES # BLD AUTO: 0.03 10*3/MM3 (ref 0–0.05)
IMM GRANULOCYTES NFR BLD AUTO: 0.4 % (ref 0–0.5)
LYMPHOCYTES # BLD AUTO: 2.01 10*3/MM3 (ref 0.7–3.1)
LYMPHOCYTES NFR BLD AUTO: 28.8 % (ref 19.6–45.3)
MCH RBC QN AUTO: 26.9 PG (ref 26.6–33)
MCHC RBC AUTO-ENTMCNC: 29.9 G/DL (ref 31.5–35.7)
MCV RBC AUTO: 90 FL (ref 79–97)
MONOCYTES # BLD AUTO: 0.63 10*3/MM3 (ref 0.1–0.9)
MONOCYTES NFR BLD AUTO: 9 % (ref 5–12)
NEUTROPHILS NFR BLD AUTO: 3.79 10*3/MM3 (ref 1.7–7)
NEUTROPHILS NFR BLD AUTO: 54.2 % (ref 42.7–76)
NRBC BLD AUTO-RTO: 0 /100 WBC (ref 0–0.2)
PLATELET # BLD AUTO: 262 10*3/MM3 (ref 140–450)
PMV BLD AUTO: 12 FL (ref 6–12)
POTASSIUM SERPL-SCNC: 4.5 MMOL/L (ref 3.5–5.2)
RBC # BLD AUTO: 3.6 10*6/MM3 (ref 4.14–5.8)
SODIUM SERPL-SCNC: 143 MMOL/L (ref 136–145)
WBC NRBC COR # BLD: 6.99 10*3/MM3 (ref 3.4–10.8)

## 2023-10-11 PROCEDURE — 63710000001 INSULIN DETEMIR PER 5 UNITS: Performed by: INTERNAL MEDICINE

## 2023-10-11 PROCEDURE — 97110 THERAPEUTIC EXERCISES: CPT

## 2023-10-11 PROCEDURE — 97535 SELF CARE MNGMENT TRAINING: CPT

## 2023-10-11 PROCEDURE — 63710000001 INSULIN LISPRO (HUMAN) PER 5 UNITS: Performed by: INTERNAL MEDICINE

## 2023-10-11 PROCEDURE — 82948 REAGENT STRIP/BLOOD GLUCOSE: CPT

## 2023-10-11 PROCEDURE — 25010000002 CEFTRIAXONE PER 250 MG: Performed by: INTERNAL MEDICINE

## 2023-10-11 PROCEDURE — 25010000002 HYDROMORPHONE 1 MG/ML SOLUTION: Performed by: INTERNAL MEDICINE

## 2023-10-11 PROCEDURE — 85025 COMPLETE CBC W/AUTO DIFF WBC: CPT | Performed by: INTERNAL MEDICINE

## 2023-10-11 PROCEDURE — 97116 GAIT TRAINING THERAPY: CPT

## 2023-10-11 PROCEDURE — 80048 BASIC METABOLIC PNL TOTAL CA: CPT | Performed by: INTERNAL MEDICINE

## 2023-10-11 NOTE — PROGRESS NOTES
[facility]  NIMESH Parkinson APRN        Internal Medicine Progress Note    10/11/2023   09:38 CDT    Name:  Erick Luong  MRN:    [id number]     Acct:     [id number]   Room:  [address] [place] [address] Day: 0     Admit Date: 9/18/2023  6:30 PM  PCP: Del Shetty MD    Subjective:     C/C: weakness, fatigue    Interval History: Status:  improved. Up to chair. No family at bedside.  Afebrile.  Blood sugars stable. Pain well controlled at present time. Preparing to work with therapy. More alert today and appears in no acute distress, but reports not feeling well, in general. Renal function improved.  Discharge planning underway.     Review of Systems   Constitutional: Positive for malaise/fatigue. Negative for chills, decreased appetite, weight gain and weight loss.   HENT:  Negative for congestion, ear discharge, hoarse voice and tinnitus.    Eyes:  Negative for blurred vision, discharge, visual disturbance and visual halos.   Cardiovascular:  Negative for chest pain, claudication, dyspnea on exertion, irregular heartbeat, leg swelling, orthopnea and paroxysmal nocturnal dyspnea.   Respiratory:  Negative for cough, shortness of breath, sputum production and wheezing.    Endocrine: Negative for cold intolerance, heat intolerance and polyuria.   Hematologic/Lymphatic: Negative for adenopathy. Does not bruise/bleed easily.   Skin:  Positive for poor wound healing. Negative for dry skin, itching and suspicious lesions.   Musculoskeletal:  Positive for neck pain. Negative for arthritis, back pain, falls, joint pain, muscle weakness and myalgias.   Gastrointestinal:  Negative for abdominal pain, constipation, diarrhea, dysphagia and hematemesis.   Genitourinary:  Negative for bladder incontinence, dysuria and frequency.   Neurological:  Positive for weakness. Negative for aphonia, disturbances in coordination and dizziness.   Psychiatric/Behavioral:  Negative for altered mental status, depression, memory  "loss and substance abuse. The patient does not have insomnia and is not nervous/anxious.          Medications:     Allergies:   Allergies   Allergen Reactions    Cefepime Hives and Anaphylaxis    Bactrim [Sulfamethoxazole-Trimethoprim] Other (See Comments)     \"RENAL FAILURE\"    Vancomycin Itching    Zolpidem Unknown - High Severity     \"makes him crazy\"    Zolpidem Tartrate Unknown - Low Severity and Provider Review Needed    Metronidazole Rash       Current Meds:   Current Facility-Administered Medications:     acetaminophen (TYLENOL) tablet 650 mg, 650 mg, Oral, Q4H PRN **OR** acetaminophen (TYLENOL) suppository 650 mg, 650 mg, Rectal, Q4H PRN, Juan Manuel Page MD    aluminum-magnesium hydroxide-simethicone (MAALOX MAX) 400-400-40 MG/5ML suspension 30 mL, 30 mL, Oral, Q4H PRN, Juan Manuel Page MD    apixaban (ELIQUIS) tablet 5 mg, 5 mg, Oral, BID With Meals, Juan Manuel Page MD    bumetanide (BUMEX) tablet 1 mg, 1 mg, Oral, BID, Brian Lomas MD    busPIRone (BUSPAR) tablet 10 mg, 10 mg, Oral, Q12H PRN, Juan Manuel Page MD    calcitriol (ROCALTROL) capsule 0.5 mcg, 0.5 mcg, Oral, Daily, Juan Manuel Page MD    carvedilol (COREG) tablet 3.125 mg, 3.125 mg, Oral, BID With Meals, Juan Manuel Page MD    cefTRIAXone (ROCEPHIN) 1,000 mg in sodium chloride 0.9 % 100 mL IVPB, 1,000 mg, Intravenous, Q24H, Juan Manuel Page MD    cholecalciferol (VITAMIN D3) tablet 5,000 Units, 5,000 Units, Oral, Daily, Juan Manuel Page MD    dextrose (D50W) (25 g/50 mL) IV injection 25 g, 25 g, Intravenous, Q15 Min PRN, Juan Manuel Page MD    dextrose (GLUTOSE) oral gel 15 g, 15 g, Oral, Q15 Min PRN, Juan Manuel Page MD    docusate sodium (COLACE) capsule 200 mg, 200 mg, Oral, BID, Juan Manuel Page MD    Dulaglutide solution pen-injector 4.5 mg ** PATIENT-SUPPLIED MEDICATION **, 4.5 mg, Subcutaneous, Weekly, Juan Manuel Page MD    glucagon (GLUCAGEN) injection 1 mg, 1 " mg, Intramuscular, Q15 Min PRN, Juan Manuel Page MD    HYDROmorphone (DILAUDID) injection 1 mg, 1 mg, Intravenous, Q8H PRN, Juan Manuel Page MD    insulin detemir (LEVEMIR) injection 30 Units, 30 Units, Subcutaneous, Daily, Juan Manuel Page MD    Insulin Lispro (humaLOG) injection 2-9 Units, 2-9 Units, Subcutaneous, 4x Daily AC & at Bedtime, Juan Manuel Page MD    isosorbide mononitrate (IMDUR) 24 hr tablet 30 mg, 30 mg, Oral, Q24H, Joanie Mendes APRN    Lidocaine HCl 4 % gel gel 1 application , 1 application , Topical, PRN, Juan Manuel Page MD    magnesium hydroxide (MILK OF MAGNESIA) 400 MG/5ML suspension 15 mL, 15 mL, Oral, Daily PRN, Juan Manuel Page MD    melatonin tablet 6 mg, 6 mg, Oral, Nightly, Shruthi Isabel APRN    metoclopramide (REGLAN) tablet 5 mg, 5 mg, Oral, TID AC, Juan Manuel Page MD    midodrine (PROAMATINE) tablet 2.5 mg, 2.5 mg, Oral, Q8H, Brian Lomas MD    nitroglycerin (NITROSTAT) SL tablet 0.4 mg, 0.4 mg, Sublingual, Q5 Min PRN, Shruthi Isabel APRN    ondansetron (ZOFRAN) tablet 4 mg, 4 mg, Oral, Q6H PRN **OR** ondansetron (ZOFRAN) injection 4 mg, 4 mg, Intravenous, Q6H PRN, Juan Manuel Page MD    oxyCODONE (oxyCONTIN) 12 hr tablet 10 mg, 10 mg, Oral, Q24H, Juan Manuel Page MD    oxyCODONE (ROXICODONE) immediate release tablet 5 mg, 5 mg, Oral, Q8H PRN, Juan Manuel Page MD    pantoprazole (PROTONIX) EC tablet 40 mg, 40 mg, Oral, BID, Juan Manuel Page MD    polyethylene glycol (MIRALAX) packet 17 g, 17 g, Oral, Daily, Juan Manuel Page MD    pregabalin (LYRICA) capsule 100 mg, 100 mg, Oral, Nightly, Juan Manuel Page MD    pregabalin (LYRICA) capsule 50 mg, 50 mg, Oral, Daily, Juan Manuel Page MD    rosuvastatin (CRESTOR) tablet 10 mg, 10 mg, Oral, Nightly, Juan Manuel Page MD    saccharomyces boulardii (FLORASTOR) capsule 250 mg, 250 mg, Oral, BID, Juan Manuel Page MD     sennosides-docusate (PERICOLACE) 8.6-50 MG per tablet 1 tablet, 1 tablet, Oral, Nightly, Juan Manuel Page MD    sodium chloride 0.9 % bolus 500 mL, 500 mL, Intravenous, Once, Brian Lomas MD    sodium chloride 0.9 % flush 10 mL, 10 mL, Intravenous, Q12H, Juan Manuel Page MD    sodium chloride 0.9 % flush 10 mL, 10 mL, Intravenous, PRN, Juan Manuel Page MD    sucralfate (CARAFATE) tablet 1 g, 1 g, Oral, TID AC, Juan Manuel Page MD    tamsulosin (FLOMAX) 24 hr capsule 0.4 mg, 0.4 mg, Oral, Daily, Juan Manuel Page MD    timolol (TIMOPTIC) 0.5 % ophthalmic solution 1 drop, 1 drop, Both Eyes, Q12H, Juan Manuel Page MD    valsartan (DIOVAN) tablet 40 mg, 40 mg, Oral, Nightly, Juan Manuel Page MD    Data:     Code Status:    There are no questions and answers to display.       Family History   Problem Relation Age of Onset    Colon cancer Father     Heart disease Father     Colon cancer Sister     Colon polyps Sister     Alzheimer's disease Mother     Coronary artery disease Sister     Coronary artery disease Sister        Social History     Socioeconomic History    Marital status:    Tobacco Use    Smoking status: Former     Types: Cigarettes     Quit date:      Years since quittin.7    Smokeless tobacco: Never    Tobacco comments:     smoked in [employer]   Vaping Use    Vaping Use: Never used   Substance and Sexual Activity    Alcohol use: No    Drug use: No    Sexual activity: Defer       Vitals:  T 97.6 P 66 R 18 Bp 106/65 Sp02 96% (room air)            I/O (24Hr):  No intake or output data in the 24 hours ending 10/11/23 0938    Labs and imaging:      Recent Results (from the past 12 hour(s))   Basic Metabolic Panel    Collection Time: 10/11/23  5:57 AM    Specimen: Blood   Result Value Ref Range    Glucose 146 (H) 65 - 99 mg/dL    BUN 50 (H) 8 - 23 mg/dL    Creatinine 3.08 (H) 0.76 - 1.27 mg/dL    Sodium 143 136 - 145 mmol/L    Potassium 4.5 3.5 - 5.2 mmol/L     Chloride 99 98 - 107 mmol/L    CO2 33.0 (H) 22.0 - 29.0 mmol/L    Calcium 9.4 8.6 - 10.5 mg/dL    BUN/Creatinine Ratio 16.2 7.0 - 25.0    Anion Gap 11.0 5.0 - 15.0 mmol/L    eGFR 21.4 (L) >60.0 mL/min/1.73   CBC Auto Differential    Collection Time: 10/11/23  5:57 AM    Specimen: Blood   Result Value Ref Range    WBC 6.99 3.40 - 10.80 10*3/mm3    RBC 3.60 (L) 4.14 - 5.80 10*6/mm3    Hemoglobin 9.7 (L) 13.0 - 17.7 g/dL    Hematocrit 32.4 (L) 37.5 - 51.0 %    MCV 90.0 79.0 - 97.0 fL    MCH 26.9 26.6 - 33.0 pg    MCHC 29.9 (L) 31.5 - 35.7 g/dL    RDW 15.2 12.3 - 15.4 %    RDW-SD 51.3 37.0 - 54.0 fl    MPV 12.0 6.0 - 12.0 fL    Platelets 262 140 - 450 10*3/mm3    Neutrophil % 54.2 42.7 - 76.0 %    Lymphocyte % 28.8 19.6 - 45.3 %    Monocyte % 9.0 5.0 - 12.0 %    Eosinophil % 7.0 (H) 0.3 - 6.2 %    Basophil % 0.6 0.0 - 1.5 %    Immature Grans % 0.4 0.0 - 0.5 %    Neutrophils, Absolute 3.79 1.70 - 7.00 10*3/mm3    Lymphocytes, Absolute 2.01 0.70 - 3.10 10*3/mm3    Monocytes, Absolute 0.63 0.10 - 0.90 10*3/mm3    Eosinophils, Absolute 0.49 (H) 0.00 - 0.40 10*3/mm3    Basophils, Absolute 0.04 0.00 - 0.20 10*3/mm3    Immature Grans, Absolute 0.03 0.00 - 0.05 10*3/mm3    nRBC 0.0 0.0 - 0.2 /100 WBC   POC Glucose Once    Collection Time: 10/11/23  7:17 AM    Specimen: Blood   Result Value Ref Range    Glucose 101 70 - 130 mg/dL                               Physical Examination:        Physical Exam  Vitals and nursing note reviewed.   Constitutional:       Appearance: Normal appearance. He is well-developed. He is obese.   HENT:      Head: Normocephalic and atraumatic.      Right Ear: External ear normal.      Left Ear: External ear normal.      Nose: Nose normal.      Mouth/Throat:      Mouth: Mucous membranes are moist.      Pharynx: Oropharynx is clear.   Eyes:      Pupils: Pupils are equal, round, and reactive to light.   Cardiovascular:      Rate and Rhythm: Normal rate. Rhythm irregular.      Heart sounds: Normal  heart sounds.   Pulmonary:      Effort: Pulmonary effort is normal.      Breath sounds: Normal breath sounds.   Abdominal:      General: Bowel sounds are normal.      Palpations: Abdomen is soft.      Comments: obese   Musculoskeletal:         General: Normal range of motion.      Cervical back: Normal range of motion and neck supple.      Comments: Generalized weakness   Skin:     General: Skin is warm and dry.      Comments: Wound vac intact   Neurological:      Mental Status: He is alert and oriented to person, place, and time.      Deep Tendon Reflexes: Reflexes are normal and symmetric.   Psychiatric:         Behavior: Behavior normal.           Assessment:            * No active hospital problems. *    Past Medical History:   Diagnosis Date    Arthritis     Autonomic disease     CAD (coronary artery disease) 02/06/2017    Cervical radiculopathy 09/16/2021    Chronic constipation with acute exaccerbation 05/10/2021    Coronary artery disease     Degeneration of cervical intervertebral disc 08/11/2021    Diabetes mellitus     Diabetic foot ulcer 08/31/2020    Diabetic polyneuropathy associated with type 2 diabetes mellitus 01/18/2021    Elevated cholesterol     Gastroesophageal reflux disease 05/13/2019    Headache     HTN (hypertension), benign 05/03/2017    Hyperlipidemia     Hypertension     Mixed hyperlipidemia 02/07/2017    Multiple lung nodules 01/26/2020    5mm, 9 mm RLL identified 1/2020, not present 10/2019.    Myocardial infarction     Osteomyelitis 01/22/2020    Osteomyelitis of fifth toe of right foot 10/07/2019    Pancreatitis     Persistent insomnia 01/20/2020    Renal disorder     Sleep apnea 02/06/2017    Sleep apnea with use of continuous positive airway pressure (CPAP)     NON-COMPLIANT    Slow transit constipation 01/16/2019    Spinal stenosis in cervical region 09/16/2021    Vitamin D deficiency 03/02/2021        Plan:        Proteus wound infection s/p debridement  New onset atrial  fibrillation  Acute on chronic diastolic CHF  CKD3  Multifactorial anemia  DM2 with hyperglycemia on long term insulin  BRITTNI  Chronic pain syndrome  Chronic neck pain with radiculopathy  Diabetic peripheral neuropathy  Hx CAD  GERD  Possible gastroparesis  Sepsis secondary to wound infection  Continue current treatment. Monitor counts. Increase activity. Labs in am. Wound care per wound care team. Continue IV antibiotics. Appreciate nephrology evaluation and treatment. Monitor renal function. Continue glycemic control efforts. Maintain patient safety. Continue pain control efforts. Continue bowel regimen.  Discharge planning.       Electronically signed by SUSAN Muñoz on 10/11/2023 at 09:38 CDT

## 2023-10-11 NOTE — DISCHARGE SUMMARY
NIMESH Cadena APRN      Internal Medicine Discharge Summary    Patient ID: Erick Luong  MRN: [id number]     Acct:  [id number]       Patient's PCP: Del Shetty MD    Admit Date: 9/18/2023     Discharge Date:   10/11/23    Admitting Physician: Juan Manuel Page MD    Discharge Physician: SUSAN Muñoz     Active Discharge Diagnoses:  Proteus wound infection s/p debridement  New onset atrial fibrillation  Acute on chronic diastolic CHF  CKD3  Multifactorial anemia  DM2 with hyperglycemia on long term insulin  BRITTNI  Chronic pain syndrome  Chronic neck pain with radiculopathy  Diabetic peripheral neuropathy  Hx CAD  GERD  Possible gastroparesis  Sepsis secondary to wound infection    Hospital Problems    * No active hospital problems. *     Past Medical History:   Diagnosis Date    Arthritis     Autonomic disease     CAD (coronary artery disease) 02/06/2017    Cervical radiculopathy 09/16/2021    Chronic constipation with acute exaccerbation 05/10/2021    Coronary artery disease     Degeneration of cervical intervertebral disc 08/11/2021    Diabetes mellitus     Diabetic foot ulcer 08/31/2020    Diabetic polyneuropathy associated with type 2 diabetes mellitus 01/18/2021    Elevated cholesterol     Gastroesophageal reflux disease 05/13/2019    Headache     HTN (hypertension), benign 05/03/2017    Hyperlipidemia     Hypertension     Mixed hyperlipidemia 02/07/2017    Multiple lung nodules 01/26/2020    5mm, 9 mm RLL identified 1/2020, not present 10/2019.    Myocardial infarction     Osteomyelitis 01/22/2020    Osteomyelitis of fifth toe of right foot 10/07/2019    Pancreatitis     Persistent insomnia 01/20/2020    Renal disorder     Sleep apnea 02/06/2017    Sleep apnea with use of continuous positive airway pressure (CPAP)     NON-COMPLIANT    Slow transit constipation 01/16/2019    Spinal stenosis in cervical region 09/16/2021    Vitamin D deficiency  03/02/2021       The patient was seen and examined on the day of discharge and this discharge summary is in conjunction with any daily progress note from day of discharge.    Code Status:    There are no questions and answers to display.       Hospital Course: Erick Luong is a  68 y.o.  male who presents with need for continued rehabilitation efforts, wound care and IV antibiotic therapy following a recent acute care stay. The patient had been in his usual state of health when he developed acute worsening of a wound that was under care. The patient had suffered a fall and was using a walking boot when he developed increased bloody, malodorous drainage from an existing wound. He presented to his podiatrist and was sent to ER for evaluation and treatment. He was found to have evidence of abscess to the left foot with gas gangrene. He was treated with IV antibiotics and underwent surgical debridement on 9/7/23 with post-operative wound measuring 6 cm x 17 cm x 4 cm with exposed bone. Surgical cultures returned positive for Proteus mirabilis. IV antibiotics with Rocephin were recommended for 6 weeks. Post-operatively, the patient developed new onset atrial fibrillation with associated chest pain. He was treated with beta blockers and anticoagulated with Eliquis. He developed acute on chronic diastolic CHF and required diuresis. He has also had issues with increased GERD with nausea and vomiting. He was evaluated by GI and underwent gastric emptying study that unfortunately revealed evidence of gastroparesis. He was treated with PPI, carafate and reglan. He was seen in consultation by nephrology for acute kidney injury on CKD 3. Renal function appears to have returned to his baseline. He transferred to our facility for ongoing antibiotic therapy, wound care and rehabilitation efforts.    The patient has maintained adequate 02 sats on room air. While here, he was seen in consultation by nephrology for ongoing  evaluation and treatment of chronic kidney disease. Other than fluctuations in renal function, counts have remained stable. He had recurrent complaints of chest pain consistent with his history of chronic angina. Cardiology was consulted for further evaluation and recommendations and isosorbide was added to medication regimen. He was seen in consultation by our wound care team and underwent routine wound care with wound vac changes. Patient has had slow progress and at times, limited participation with therapies. He has continued with antibiotic therapy and remains stable. He is now felt stable for discharge to SNF for continued wound care, IV antibiotics, and rehabilitation efforts.     Consults:  Dr. Lomas (nephrology)  Dr. Onofre (cardiology)    Disposition: SNF    Discharged Condition: Stable    Physical Exam  Vitals and nursing note reviewed.   Constitutional:       Appearance: Normal appearance. He is well-developed. He is obese.   HENT:      Head: Normocephalic and atraumatic.      Right Ear: External ear normal.      Left Ear: External ear normal.      Nose: Nose normal.      Mouth/Throat:      Mouth: Mucous membranes are moist.      Pharynx: Oropharynx is clear.   Eyes:      Pupils: Pupils are equal, round, and reactive to light.   Cardiovascular:      Rate and Rhythm: Normal rate. Rhythm irregular.      Heart sounds: Normal heart sounds.   Pulmonary:      Effort: Pulmonary effort is normal.      Breath sounds: Normal breath sounds.   Abdominal:      General: Bowel sounds are normal.      Palpations: Abdomen is soft.      Comments: obese   Musculoskeletal:         General: Normal range of motion.      Cervical back: Normal range of motion and neck supple.      Comments: Generalized weakness   Skin:     General: Skin is warm and dry.      Comments: Wound vac intact   Neurological:      Mental Status: He is alert and oriented to person, place, and time.      Deep Tendon Reflexes: Reflexes are normal and  symmetric.   Psychiatric:         Behavior: Behavior normal.     Follow Up: Facility physician of record    Diet: Diet: Regular/House Diet, Cardiac Diets, Diabetic Diets; Low Sodium (2g); Consistent Carbohydrate; Texture: Regular Texture (IDDSI 7); Fluid Consistency: Thin (IDDSI 0)    Discharge Medications:   See computer generated medication reconciliation form      Time Spent on discharge is  32 minutes in patient examination, evaluation, patient/family counseling as well as medication reconciliation, prescriptions for required medications, discharge plan and follow up.     Electronically signed by SUSAN Muñoz on 10/11/2023 at 10:22 CDT     I have discussed the care of Erick Luong, including pertinent history and exam findings, with the nurse practitioner.    I have seen and examined the patient and the key elements of all parts of the encounter have been performed by me.  I agree with the assessment, plan and orders as documented by SUSAN Haney, after I modified the exam findings and the plan of treatments and the final version is my approved version of the assessment.        Electronically signed by Juan Manuel Page MD on 10/11/2023 at 21:04 CDT

## 2023-10-11 NOTE — PROGRESS NOTES
Nephrology ([facility]) Progress Note      Patient:  Erick Luong  YOB: 1955  Date of Service: 10/11/2023  MRN: [id number]   Acct: [id number]   Primary Care Physician: Del Shetty MD  Advance Directive:   There are no questions and answers to display.     Admit Date: 9/18/2023       Hospital Day: 0  Referring Provider: Juan Manuel Page MD      Patient personally seen and examined.  Complete chart including Consults, Notes, Operative Reports, Labs, Cardiology, and Radiology studies reviewed as able.    Chief complaint: Abnormal labs.    Subjective:  Erick Luong is a 68 y.o. male  whom we were consulted for chronic kidney disease stage IIIb.  He has stage IIIb chronic kidney disease baseline, follows me in the office as he has diabetic nephropathy.  He has history of insulin-dependent type 2 diabetes, hypertension, abdominal obesity, obstructive sleep apnea, diabetic foot ulcer and coronary artery disease.  He was accepted as a transfer from [facility].  Patient was admitted at [facility] by Dr. Gomes and extensive debridement of left foot wound at plantar aspect.  Postoperative wound measuring 6 x 17 x 4 cm with exposed bone.  Surgical culture returned positive for Proteus Mirabills.  Patient needed long-term IV antibiotics.  He is now admitted for wound care, long-term IV antibiotics, chronic kidney disease and treatment of diastolic congestive heart failure.  Patient also has history of gastroparesis.   This morning patient was unable to urinate.  His bladder scan was consistent for more than 800 cc of urine.  He underwent straight catheterization.    This morning he is sitting up, still complaining of weak, lack of energy and tiredness.      Allergies:  Cefepime, Bactrim [sulfamethoxazole-trimethoprim], Vancomycin, Zolpidem, Zolpidem tartrate, and Metronidazole    Home Meds:  Medications Prior to Admission   Medication Sig Dispense Refill Last  Dose    amitriptyline (ELAVIL) 25 MG tablet Take 2 tablets by mouth Daily at bedtime. 60 tablet 1     amoxicillin-clavulanate (Augmentin) 500-125 MG per tablet Take 1 tablet by mouth every 12 hours with meals for 7 days. 14 tablet 0     ascorbic acid (VITAMIN C) 1000 MG tablet Take 1 tablet by mouth Daily. 30 tablet 3     Aspirin 81 MG capsule Take 81 mg by mouth Daily.       bumetanide (BUMEX) 2 MG tablet Take 1 tablet by mouth Daily. Take 2 tablets in morning;1 tablet at night       busPIRone (BUSPAR) 10 MG tablet Take 1 tablet by mouth 2 (Two) Times a Day As Needed. 100 tablet 3     calcitriol (ROCALTROL) 0.5 MCG capsule Take 1 capsule by mouth Daily. 90 capsule 4     carvedilol (COREG) 6.25 MG tablet Take 1 tablet by mouth 2 (Two) Times a Day. 180 tablet 4     Cholecalciferol (D-5000) 125 MCG (5000 UT) tablet Take 1 tablet by mouth Daily Before Lunch. 30 tablet 2     cloNIDine (CATAPRES) 0.1 MG tablet Take 1 tablet by mouth Every 12 (Twelve) Hours. 60 tablet 2     Diclofenac Sodium (VOLTAREN) 1 % gel gel Apply 2 g topically to the appropriate area as directed 4 (Four) Times a Day As Needed. 300 g 11     donepezil (ARICEPT) 10 MG tablet Take 1 tablet by mouth Daily. 90 tablet 4     Dulaglutide (Trulicity) 4.5 MG/0.5ML solution pen-injector Inject 0.5 mL under the skin into the appropriate area as directed 1 (One) Time Per Week. 2 mL 11     empagliflozin (JARDIANCE) 25 MG tablet tablet Take 1 tablet by mouth Daily. 30 tablet 2     HYDROcodone-acetaminophen (NORCO) 7.5-325 MG per tablet Take 1 tablet by mouth 2 (Two) Times a Day As Needed. 40 tablet 0     Insulin Regular Human, Conc, (HumuLIN R U-500 KwikPen) 500 UNIT/ML solution pen-injector CONCENTRATED injection Inject 120 Units under the skin into the appropriate area as directed 2 (Two) Times a Day with breakfast and dinner. (Patient taking differently: Inject 120 Units under the skin into the appropriate area as directed 3 (Three) Times a Day Before Meals.)  18 mL 5     midodrine (PROAMATINE) 10 MG tablet Take 1 tablet by mouth 3 (Three) Times a Day. 270 tablet 4     ondansetron ODT (ZOFRAN-ODT) 8 MG disintegrating tablet Place 1 tablet under tongue 3 times a day as needed. 25 tablet 1     pantoprazole (Protonix) 40 MG EC tablet Take 1 tablet by mouth 2 (Two) Times a Day. 180 tablet 4     polyethylene glycol (MIRALAX) 17 g packet Take 17 g by mouth Daily. Obtain OTC       pregabalin (LYRICA) 100 MG capsule Take 1 capsule by mouth Daily With Breakfast AND 2 capsules Every Night. 90 capsule 2     rosuvastatin (CRESTOR) 40 MG tablet Take 1 tablet by mouth Every Night. 90 tablet 3     sennosides-docusate (PERICOLACE) 8.6-50 MG per tablet Take 1 tablet by mouth Every Night. Obtain OTC       sennosides-docusate (PERICOLACE) 8.6-50 MG per tablet Take 1 tablet by mouth every night at bedtime. 30 tablet 2     sodium hypochlorite (DAKIN'S 1/4 STRENGTH) 0.125 % solution topical solution 0.125% Apply to affected area twice daily 473 mL 3     tamsulosin (FLOMAX) 0.4 MG capsule 24 hr capsule Take 1 capsule by mouth Daily. 90 capsule 1        Medicines:  Current Facility-Administered Medications   Medication Dose Route Frequency Provider Last Rate Last Admin    acetaminophen (TYLENOL) tablet 650 mg  650 mg Oral Q4H PRN Juan Manuel Page MD        Or    acetaminophen (TYLENOL) suppository 650 mg  650 mg Rectal Q4H PRN Juan Manuel Page MD        aluminum-magnesium hydroxide-simethicone (MAALOX MAX) 400-400-40 MG/5ML suspension 30 mL  30 mL Oral Q4H PRN Juan Manuel Page MD        apixaban (ELIQUIS) tablet 5 mg  5 mg Oral BID With Meals Juan Manuel Page MD        bumetanide (BUMEX) tablet 1 mg  1 mg Oral BID Brian Lomas MD        busPIRone (BUSPAR) tablet 10 mg  10 mg Oral Q12H PRN Juan Manuel Page MD        calcitriol (ROCALTROL) capsule 0.5 mcg  0.5 mcg Oral Daily Juan Manuel Page MD        carvedilol (COREG) tablet 3.125 mg  3.125 mg Oral BID With  Meals Juan Manuel Page MD        cefTRIAXone (ROCEPHIN) 1,000 mg in sodium chloride 0.9 % 100 mL IVPB  1,000 mg Intravenous Q24H Juan Manuel Page MD        cholecalciferol (VITAMIN D3) tablet 5,000 Units  5,000 Units Oral Daily Juan Manuel Page MD        dextrose (D50W) (25 g/50 mL) IV injection 25 g  25 g Intravenous Q15 Min PRN Juan Manuel Page MD        dextrose (GLUTOSE) oral gel 15 g  15 g Oral Q15 Min PRN Juan Manuel Page MD        docusate sodium (COLACE) capsule 200 mg  200 mg Oral BID Juan Manuel Page MD        Dulaglutide solution pen-injector 4.5 mg ** PATIENT-SUPPLIED MEDICATION **  4.5 mg Subcutaneous Weekly Juan Manuel Page MD        glucagon (GLUCAGEN) injection 1 mg  1 mg Intramuscular Q15 Min PRN Juan Manuel Page MD        HYDROmorphone (DILAUDID) injection 1 mg  1 mg Intravenous Q8H PRN Juan Manuel Page MD        insulin detemir (LEVEMIR) injection 30 Units  30 Units Subcutaneous Daily Juan Manuel Page MD        Insulin Lispro (humaLOG) injection 2-9 Units  2-9 Units Subcutaneous 4x Daily AC & at Bedtime Juan Manuel Page MD        isosorbide mononitrate (IMDUR) 24 hr tablet 30 mg  30 mg Oral Q24H Joanie Mendes APRN        Lidocaine HCl 4 % gel gel 1 application   1 application  Topical PRN Juan Manuel Page MD        magnesium hydroxide (MILK OF MAGNESIA) 400 MG/5ML suspension 15 mL  15 mL Oral Daily PRN Juan Manuel Page MD        melatonin tablet 6 mg  6 mg Oral Nightly Shruthi Isabel APRN        metoclopramide (REGLAN) tablet 5 mg  5 mg Oral TID AC Juan Manuel Page MD        midodrine (PROAMATINE) tablet 2.5 mg  2.5 mg Oral Q8H Brian Lomas MD        nitroglycerin (NITROSTAT) SL tablet 0.4 mg  0.4 mg Sublingual Q5 Min PRN Shruthi Isabel APRN        ondansetron (ZOFRAN) tablet 4 mg  4 mg Oral Q6H PRN Juan Manuel Page MD        Or    ondansetron (ZOFRAN) injection 4 mg  4 mg Intravenous Q6H PRN  Juan Manuel Page MD        oxyCODONE (oxyCONTIN) 12 hr tablet 10 mg  10 mg Oral Q24H Juan Manuel Page MD        oxyCODONE (ROXICODONE) immediate release tablet 5 mg  5 mg Oral Q8H PRN Juan Manuel Page MD        pantoprazole (PROTONIX) EC tablet 40 mg  40 mg Oral BID Juan Manuel Page MD        polyethylene glycol (MIRALAX) packet 17 g  17 g Oral Daily Juan Manuel Page MD        pregabalin (LYRICA) capsule 100 mg  100 mg Oral Nightly Juan Manuel Page MD        pregabalin (LYRICA) capsule 50 mg  50 mg Oral Daily Juan Manuel Page MD        rosuvastatin (CRESTOR) tablet 10 mg  10 mg Oral Nightly Juan Manuel Page MD        saccharomyces boulardii (FLORASTOR) capsule 250 mg  250 mg Oral BID Juan Manuel Page MD        sennosides-docusate (PERICOLACE) 8.6-50 MG per tablet 1 tablet  1 tablet Oral Nightly Juan Manuel Page MD        sodium chloride 0.9 % bolus 500 mL  500 mL Intravenous Once Brian Lomas MD        sodium chloride 0.9 % flush 10 mL  10 mL Intravenous Q12H Juan Manuel Page MD        sodium chloride 0.9 % flush 10 mL  10 mL Intravenous PRN Juan Manuel Page MD        sucralfate (CARAFATE) tablet 1 g  1 g Oral TID AC Juan Manuel Page MD        tamsulosin (FLOMAX) 24 hr capsule 0.4 mg  0.4 mg Oral Daily Juan Manuel Page MD        timolol (TIMOPTIC) 0.5 % ophthalmic solution 1 drop  1 drop Both Eyes Q12H Juan Manuel Page MD        valsartan (DIOVAN) tablet 40 mg  40 mg Oral Nightly Juan Manuel Page MD           Past Medical History:  Past Medical History:   Diagnosis Date    Arthritis     Autonomic disease     CAD (coronary artery disease) 02/06/2017    Cervical radiculopathy 09/16/2021    Chronic constipation with acute exaccerbation 05/10/2021    Coronary artery disease     Degeneration of cervical intervertebral disc 08/11/2021    Diabetes mellitus     Diabetic foot ulcer 08/31/2020    Diabetic polyneuropathy associated  with type 2 diabetes mellitus 01/18/2021    Elevated cholesterol     Gastroesophageal reflux disease 05/13/2019    Headache     HTN (hypertension), benign 05/03/2017    Hyperlipidemia     Hypertension     Mixed hyperlipidemia 02/07/2017    Multiple lung nodules 01/26/2020    5mm, 9 mm RLL identified 1/2020, not present 10/2019.    Myocardial infarction     Osteomyelitis 01/22/2020    Osteomyelitis of fifth toe of right foot 10/07/2019    Pancreatitis     Persistent insomnia 01/20/2020    Renal disorder     Sleep apnea 02/06/2017    Sleep apnea with use of continuous positive airway pressure (CPAP)     NON-COMPLIANT    Slow transit constipation 01/16/2019    Spinal stenosis in cervical region 09/16/2021    Vitamin D deficiency 03/02/2021       Past Surgical History:  Past Surgical History:   Procedure Laterality Date    ABDOMINAL SURGERY      AMPUTATION FOOT / TOE Left 10/2021    5th digit     ANTERIOR CERVICAL DISCECTOMY W/ FUSION N/A 8/5/2022    Procedure: CERVICAL DISCECTOMY ANTERIOR WITH FUSION C5-6 with possible plating of C5-7 with neuromonitoring and 1 c-arm;  Surgeon: Karel Soliz MD;  Location:  [facility] OR;  Service: Neurosurgery;  Laterality: N/A;    APPENDECTOMY      BACK SURGERY      CARDIAC CATHETERIZATION Left 02/08/2021    Procedure: Left Heart Cath w poss intervention left anatomical snuff box acess;  Surgeon: Omkar Charles DO;  Location:  [facility] CATH INVASIVE LOCATION;  Service: Cardiology;  Laterality: Left;    CARDIAC SURGERY      CATARACT EXTRACTION      CERVICAL SPINE SURGERY      COLONOSCOPY N/A 01/31/2017    Normal exam repeat in 5 years    COLONOSCOPY N/A 02/11/2019    Mild acute inflammation    COLONOSCOPY W/ POLYPECTOMY  03/04/2014    Hyperplastic polyp    CORONARY ARTERY BYPASS GRAFT  10/2015    ENDOSCOPY  04/13/2011    Gastritis with hemorrhage    ENDOSCOPY N/A 05/05/2017    Normal exam    ENDOSCOPY N/A 02/11/2019    Gastritis    ENDOSCOPY N/A 09/01/2020    Non-erosive  gastritis with hemorrhage    ENDOSCOPY N/A 02/10/2021    Esophagitis    FOOT SURGERY Left     INCISION AND DRAINAGE OF WOUND Left 2007    spider bite       Family History  Family History   Problem Relation Age of Onset    Colon cancer Father     Heart disease Father     Colon cancer Sister     Colon polyps Sister     Alzheimer's disease Mother     Coronary artery disease Sister     Coronary artery disease Sister        Social History  Social History     Socioeconomic History    Marital status:    Tobacco Use    Smoking status: Former     Types: Cigarettes     Quit date:      Years since quittin.7    Smokeless tobacco: Never    Tobacco comments:     smoked in highschool   Vaping Use    Vaping Use: Never used   Substance and Sexual Activity    Alcohol use: No    Drug use: No    Sexual activity: Defer       Review of Systems:  History obtained from chart review and the patient  General ROS: No fever or chills  Respiratory ROS: No cough, shortness of breath, wheezing  Cardiovascular ROS: No chest pain or palpitations  Gastrointestinal ROS: No abdominal pain or melena  Genito-Urinary ROS: No dysuria or hematuria  Psych ROS: No anxiety and depression  14 point ROS reviewed with the patient and negative except as noted above and in the HPI unless unable to obtain.    Objective:  Blood pressure: 92/63 mmHg  Heart rate is 76 bpm  O2 saturation 97%    General: awake/alert   HEENT: Normocephalic atraumatic head  Neck: Supple with no JVD carotid bruits.  Chest:  clear to auscultation bilaterally without respiratory distress  CVS: regular rate and rhythm  Abdominal: soft, nontender, positive bowel sounds  Extremities:  Left foot diabetic ulcer at plantar aspect  Skin: warm and dry without rash      Labs:  Results from last 7 days   Lab Units 10/11/23  0557 10/09/23  0554 10/05/23  0502   WBC 10*3/mm3 6.99 7.81 6.98   HEMOGLOBIN g/dL 9.7* 9.5* 8.8*   HEMATOCRIT % 32.4* 32.0* 29.3*   PLATELETS 10*3/mm3 262 280  244         Results from last 7 days   Lab Units 10/11/23  0557 10/10/23  0701 10/09/23  0554   SODIUM mmol/L 143 144 140   POTASSIUM mmol/L 4.5 4.8 4.4   CHLORIDE mmol/L 99 99 96*   CO2 mmol/L 33.0* 33.0* 31.0*   BUN mg/dL 50* 46* 46*   CREATININE mg/dL 3.08* 3.49* 3.15*   CALCIUM mg/dL 9.4 9.6 9.7   EGFR mL/min/1.73 21.4* 18.4* 20.8*   GLUCOSE mg/dL 146* 134* 181*       Radiology:   Imaging Results (Last 72 Hours)       Procedure Component Value Units Date/Time    XR Shoulder 2+ View Bilateral [[id number]] Collected: 10/10/23 1553     Updated: 10/10/23 1558    Narrative:      EXAM: XR SHOULDER 2+ VW BILATERAL-      DATE: 10/10/2023 2:44 PM CDT     HISTORY: fall c/o pain       COMPARISON: No existing relevant imaging studies available.     TECHNIQUE: 2 views of the bilateral shoulders. AP internal rotation, AP  external rotation of the bilateral shoulders. 4 images.     FINDINGS:    RIGHT shoulder. Humeral head abuts the acromion with degenerative  remodeling. Moderate degenerative changes of the acromioclavicular joint  with 4 mm undersurface spurring. Limited assessment of the glenohumeral  joint alignment on provided views. No convincing acute bony fracture.  Visualized portion of the RIGHT chest appears grossly within normal  limits.     LEFT shoulder. No convincing acute fracture or aggressive bony finding.  Moderate degenerative changes of the acromioclavicular joint with 4 mm  undersurface spurring. Limited assessment of the glenohumeral joint on  provided views. Visualized portion of the LEFT chest appears grossly  within normal limits, not optimally assessed on this exam.     Partially visualized cervical fixation hardware, not optimally evaluated  on this exam.  Sternotomy wires and changes of CABG.          Impression:      1. Degenerative changes of the bilateral shoulders as above.     This report was signed and finalized on 10/10/2023 3:55 PM CDT by Dr Bree Hannah MD.       CT Cervical Spine  Without Contrast [[id number]] Collected: 10/10/23 0956     Updated: 10/10/23 1005    Narrative:      EXAM: CT CERVICAL SPINE WO CONTRAST- - 10/10/2023 9:25 AM CDT     HISTORY: Spinal cord injury, follow up       COMPARISON: 11/7/2021.     DOSE LENGTH PRODUCT: 402.3 mGy cm. Automated exposure control was also  utilized to decrease patient radiation dose.     TECHNIQUE: Unenhanced CT of the cervical spine performed with  multiplanar reformats.     FINDINGS:  C1 through mid T2 visualized. 7 cervical vertebral bodies. Anterior  cervical discectomy and fixation hardware C5-C7. There appears to be  near complete bony fusion across C6-7. No evidence of hardware  loosening.     Normal alignment. The head is tilted toward the LEFT, which could be  positional or seen with torticollis. Normal vertebral body height. No  evidence of acute fracture. Limited evaluation of the spinal canal given  CT technique. Mild diffuse native disc height loss, moderate at C7-T1.     C2-C3: Small disc osteophyte complex. No convincing spinal canal or  foraminal stenosis.  C3-C4: Disc osteophyte complex. Severe appearing spinal canal stenosis  with 8 x 13 mm central disc protrusion indenting the ventral spinal  cord. No RIGHT and mild LEFT foraminal stenosis.  C4-C5: Disc osteophyte complex. Severe appearing spinal canal stenosis  indenting the ventral spinal cord. No foraminal stenosis.  C5-C6: Discectomy. Residual or recurrent disc osteophyte complex. Severe  spinal canal stenosis. Moderate to severe bilateral foraminal stenosis.  Mild bilateral facet hypertrophy.  C6-C7: Discectomy. Spinal canal and foramen appear patent.  C7-T1: Disc osteophyte complex. Limited evaluation of the spinal canal  due to beam starvation. Foramen appear patent.     Overlying soft tissues appear within normal limits.          Impression:      1. Severe appearing spinal canal stenosis at C3-4, C4-5 and C5-6.  2. The head is tilted toward the LEFT, which could be  positional or seen  with torticollis.  3. Postoperative changes C5-C7. No evidence of hardware loosening.        This report was signed and finalized on 10/10/2023 10:02 AM CDT by Dr Bree Hannah MD.       CT Head Without Contrast [[id number]] Collected: 10/10/23 0942     Updated: 10/10/23 0953    Narrative:      EXAMINATION: CT HEAD WO CONTRAST-      10/10/2023 9:25 AM CDT     HISTORY: Head trauma, minor (Age >= 65y)     In order to have a CT radiation dose as low as reasonably achievable  Automated Exposure Control was utilized for adjustment of the mA and/or  KV according to patient size.     DLP in mGycm= 780     The CT scan of the head is performed without intravenous contrast  enhancement.     The images are acquired in axial plane with subsequent reconstruction in  coronal and sagittal planes.     Comparison is made with the previous study dated 8/21/2023.     There is no evidence of intracranial hemorrhage or hematoma.     There is no evidence of a mass. No midline shift.     Moderately prominent ventricles, basal cisterns and cortical sulci are  age appropriate.     The gray-white matter differentiation is maintained.     Images reviewed in bone window show no evidence of a skull fracture.  Limited visualized paranasal sinuses and mastoid air cells are  unremarkable. Severe atheromatous changes of the intracranial internal  carotid arteries in the cavernous sinus bilaterally are noted.  Atheromatous changes of the intracranial vertebral arteries bilaterally  right more than the left are noted.       Impression:      1. No acute intracranial abnormality.  2. No skull fracture.                                This report was signed and finalized on 10/10/2023 9:50 AM CDT by Dr. Carlos Cutler MD.               Culture:  No results found for: BLOODCX, URINECX, WOUNDCX, MRSACX, RESPCX, STOOLCX      Assessment   1.  Acute kidney injury/improving.  2..Acute tubular necrosis  3.  Stage IIIb chronic kidney  disease baseline.  3.  Status post debridement of diabetic foot ulcer  4.  Chronic diastolic CHF.  5.  Proteus wound infection  6.  Insulin-dependent type 2 diabetes.  7.  History of gastroparesis.  8.  Secondary hyperparathyroidism.  9.  Anemia of CKD.  10.  Type II diabetic nephropathy.  11.  Currently hypotensive    Plan:  1.  Continue straight cath as needed.  2.  Midodrine 2.5 mg p.o. 3 times daily.  3.  Continue p.o. Bumex  4.  Continue wound care.    Brain Lomas MD  10/11/2023  13:22 CDT

## 2023-11-20 ENCOUNTER — TELEPHONE (OUTPATIENT)
Dept: VASCULAR SURGERY | Facility: CLINIC | Age: 68
End: 2023-11-20
Payer: COMMERCIAL

## 2023-11-20 NOTE — TELEPHONE ENCOUNTER
Call placed to patient and spoke with wife, patient is currently in [facility] for rehab and may not be able to attend , will call office to let us know

## 2023-11-21 ENCOUNTER — OFFICE VISIT (OUTPATIENT)
Dept: VASCULAR SURGERY | Facility: CLINIC | Age: 68
End: 2023-11-21
Payer: COMMERCIAL

## 2023-11-21 VITALS
DIASTOLIC BLOOD PRESSURE: 82 MMHG | HEART RATE: 88 BPM | BODY MASS INDEX: 39.28 KG/M2 | WEIGHT: 290 LBS | SYSTOLIC BLOOD PRESSURE: 144 MMHG | HEIGHT: 72 IN | OXYGEN SATURATION: 97 %

## 2023-11-21 DIAGNOSIS — L97.422 DIABETIC ULCER OF LEFT MIDFOOT ASSOCIATED WITH TYPE 2 DIABETES MELLITUS, WITH FAT LAYER EXPOSED: ICD-10-CM

## 2023-11-21 DIAGNOSIS — I73.9 PAD (PERIPHERAL ARTERY DISEASE): Primary | ICD-10-CM

## 2023-11-21 DIAGNOSIS — E11.621 DIABETIC ULCER OF LEFT MIDFOOT ASSOCIATED WITH TYPE 2 DIABETES MELLITUS, WITH FAT LAYER EXPOSED: ICD-10-CM

## 2023-11-21 DIAGNOSIS — I10 ESSENTIAL HYPERTENSION: ICD-10-CM

## 2023-11-21 PROCEDURE — 99204 OFFICE O/P NEW MOD 45 MIN: CPT | Performed by: SURGERY

## 2023-11-21 NOTE — LETTER
November 21, 2023     Charley Gomes DPM  [address] [place] [address] [place] [address]    Patient: Erick Luong   YOB: 1955   Date of Visit: 11/21/2023     Dear Charley Gomes DPM:       Thank you for referring Erick Luong to me for evaluation. Below are the relevant portions of my assessment and plan of care.    If you have questions, please do not hesitate to call me. I look forward to following Erick along with you.         Sincerely,        Benito Garvey,         CC: MD Mare Quintero Griffin K, DO  11/21/23 1638  Sign when Signing Visit  11/21/2023      Charley Gomes DPM  [address] [place] [address] [place] [address]    Erick Luong  1955    Chief Complaint   Patient presents with   • NEW PATIENT     Referred from Charley Gomes for abnormal arterial duplex. Complains of swelling, pain and numbness in legs.        Dear Charley Gomes DPM    HPI  I had the pleasure of seeing your patient Erick Luong in the office today.  Thank you kindly for this consultation.  As you recall, Erick Luong is a 68 y.o.  male who you are currently following for wound to left foot. Surgery was second week of September.  He went back for 2 other surgeries since that time, last about 4 weeks ago.  He is diabetic.  He is maintained on aspirin and Crestor.  He states that the wound has been healing slowly but is clean and dry.  He did have noninvasive testing performed which I did review in office.       Past Medical History:   Diagnosis Date   • Arthritis    • Autonomic disease    • CAD (coronary artery disease) 02/06/2017   • Cervical radiculopathy 09/16/2021   • Chronic constipation with acute exaccerbation 05/10/2021   • Coronary artery disease    • Degeneration of cervical intervertebral disc 08/11/2021   • Diabetes mellitus    • Diabetic foot ulcer 08/31/2020   • Diabetic polyneuropathy associated with type 2 diabetes mellitus 01/18/2021   • Elevated cholesterol    •  Gastroesophageal reflux disease 05/13/2019   • Headache    • HTN (hypertension), benign 05/03/2017   • Hyperlipidemia    • Hypertension    • Mixed hyperlipidemia 02/07/2017   • Multiple lung nodules 01/26/2020    5mm, 9 mm RLL identified 1/2020, not present 10/2019.   • Myocardial infarction    • Osteomyelitis 01/22/2020   • Osteomyelitis of fifth toe of right foot 10/07/2019   • Pancreatitis    • Persistent insomnia 01/20/2020   • Renal disorder    • Sleep apnea 02/06/2017   • Sleep apnea with use of continuous positive airway pressure (CPAP)     NON-COMPLIANT   • Slow transit constipation 01/16/2019   • Spinal stenosis in cervical region 09/16/2021   • Vitamin D deficiency 03/02/2021       Past Surgical History:   Procedure Laterality Date   • ABDOMINAL SURGERY     • AMPUTATION FOOT / TOE Left 10/2021    5th digit    • ANTERIOR CERVICAL DISCECTOMY W/ FUSION N/A 8/5/2022    Procedure: CERVICAL DISCECTOMY ANTERIOR WITH FUSION C5-6 with possible plating of C5-7 with neuromonitoring and 1 c-arm;  Surgeon: Karel Soliz MD;  Location: [facility] OR;  Service: Neurosurgery;  Laterality: N/A;   • APPENDECTOMY     • BACK SURGERY     • CARDIAC CATHETERIZATION Left 02/08/2021    Procedure: Left Heart Cath w poss intervention left anatomical snuff box acess;  Surgeon: Omkar Charles DO;  Location:  [facility];  Service: Cardiology;  Laterality: Left;   • CARDIAC SURGERY     • CATARACT EXTRACTION     • CERVICAL SPINE SURGERY     • COLONOSCOPY N/A 01/31/2017    Normal exam repeat in 5 years   • COLONOSCOPY N/A 02/11/2019    Mild acute inflammation   • COLONOSCOPY W/ POLYPECTOMY  03/04/2014    Hyperplastic polyp   • CORONARY ARTERY BYPASS GRAFT  10/2015   • ENDOSCOPY  04/13/2011    Gastritis with hemorrhage   • ENDOSCOPY N/A 05/05/2017    Normal exam   • ENDOSCOPY N/A 02/11/2019    Gastritis   • ENDOSCOPY N/A 09/01/2020    Non-erosive gastritis with hemorrhage   • ENDOSCOPY N/A 02/10/2021     "Esophagitis   • FOOT SURGERY Left    • INCISION AND DRAINAGE OF WOUND Left 2007    spider bite       Family History   Problem Relation Age of Onset   • Colon cancer Father    • Heart disease Father    • Colon cancer Sister    • Colon polyps Sister    • Alzheimer's disease Mother    • Coronary artery disease Sister    • Coronary artery disease Sister        Social History     Socioeconomic History   • Marital status:    Tobacco Use   • Smoking status: Former     Types: Cigarettes     Quit date:      Years since quittin.9   • Smokeless tobacco: Never   • Tobacco comments:     smoked in highschool   Vaping Use   • Vaping Use: Never used   Substance and Sexual Activity   • Alcohol use: No   • Drug use: No   • Sexual activity: Defer       Allergies   Allergen Reactions   • Cefepime Hives and Anaphylaxis   • Bactrim [Sulfamethoxazole-Trimethoprim] Other (See Comments)     \"RENAL FAILURE\"   • Vancomycin Itching   • Zolpidem Unknown - High Severity     \"makes him crazy\"   • Zolpidem Tartrate Unknown - Low Severity and Provider Review Needed   • Metronidazole Rash         Current Outpatient Medications:   •  amitriptyline (ELAVIL) 25 MG tablet, Take 2 tablets by mouth Daily at bedtime., Disp: 60 tablet, Rfl: 1  •  amoxicillin-clavulanate (Augmentin) 500-125 MG per tablet, Take 1 tablet by mouth every 12 hours with meals for 7 days., Disp: 14 tablet, Rfl: 0  •  ascorbic acid (VITAMIN C) 1000 MG tablet, Take 1 tablet by mouth Daily., Disp: 30 tablet, Rfl: 3  •  Aspirin 81 MG capsule, Take 81 mg by mouth Daily., Disp: , Rfl:   •  bumetanide (BUMEX) 2 MG tablet, Take 1 tablet by mouth Daily. Take 2 tablets in morning;1 tablet at night, Disp: , Rfl:   •  busPIRone (BUSPAR) 10 MG tablet, Take 1 tablet by mouth 2 (Two) Times a Day As Needed., Disp: 100 tablet, Rfl: 3  •  calcitriol (ROCALTROL) 0.5 MCG capsule, Take 1 capsule by mouth Daily., Disp: 90 capsule, Rfl: 4  •  carvedilol (COREG) 6.25 MG tablet, Take 1 " tablet by mouth 2 (Two) Times a Day., Disp: 180 tablet, Rfl: 4  •  Cholecalciferol (D-5000) 125 MCG (5000 UT) tablet, Take 1 tablet by mouth Daily Before Lunch., Disp: 30 tablet, Rfl: 2  •  cloNIDine (CATAPRES) 0.1 MG tablet, Take 1 tablet by mouth Every 12 (Twelve) Hours., Disp: 60 tablet, Rfl: 2  •  Diclofenac Sodium (VOLTAREN) 1 % gel gel, Apply 2 g topically to the appropriate area as directed 4 (Four) Times a Day As Needed., Disp: 300 g, Rfl: 11  •  donepezil (ARICEPT) 10 MG tablet, Take 1 tablet by mouth Daily., Disp: 90 tablet, Rfl: 4  •  Dulaglutide (Trulicity) 4.5 MG/0.5ML solution pen-injector, Inject 0.5 mL under the skin into the appropriate area as directed 1 (One) Time Per Week., Disp: 2 mL, Rfl: 11  •  empagliflozin (JARDIANCE) 25 MG tablet tablet, Take 1 tablet by mouth Daily., Disp: 30 tablet, Rfl: 2  •  HYDROcodone-acetaminophen (NORCO) 7.5-325 MG per tablet, Take 1 tablet by mouth 2 (Two) Times a Day As Needed., Disp: 40 tablet, Rfl: 0  •  Insulin Regular Human, Conc, (HumuLIN R U-500 KwikPen) 500 UNIT/ML solution pen-injector CONCENTRATED injection, Inject 120 Units under the skin into the appropriate area as directed 2 (Two) Times a Day with breakfast and dinner. (Patient taking differently: Inject 120 Units under the skin into the appropriate area as directed 3 (Three) Times a Day Before Meals.), Disp: 18 mL, Rfl: 5  •  midodrine (PROAMATINE) 10 MG tablet, Take 1 tablet by mouth 3 (Three) Times a Day., Disp: 270 tablet, Rfl: 4  •  ondansetron ODT (ZOFRAN-ODT) 8 MG disintegrating tablet, Place 1 tablet under tongue 3 times a day as needed., Disp: 25 tablet, Rfl: 1  •  pantoprazole (Protonix) 40 MG EC tablet, Take 1 tablet by mouth 2 (Two) Times a Day., Disp: 180 tablet, Rfl: 4  •  polyethylene glycol (MIRALAX) 17 g packet, Take 17 g by mouth Daily. Obtain OTC, Disp: , Rfl:   •  pregabalin (LYRICA) 100 MG capsule, Take 1 capsule by mouth Daily With Breakfast AND 2 capsules Every Night., Disp: 90  "capsule, Rfl: 2  •  rosuvastatin (CRESTOR) 40 MG tablet, Take 1 tablet by mouth Every Night., Disp: 90 tablet, Rfl: 3  •  sennosides-docusate (PERICOLACE) 8.6-50 MG per tablet, Take 1 tablet by mouth Every Night. Obtain OTC, Disp: , Rfl:   •  sennosides-docusate (PERICOLACE) 8.6-50 MG per tablet, Take 1 tablet by mouth every night at bedtime., Disp: 30 tablet, Rfl: 2  •  sodium hypochlorite (DAKIN'S 1/4 STRENGTH) 0.125 % solution topical solution 0.125%, Apply to affected area twice daily, Disp: 473 mL, Rfl: 3  •  tamsulosin (FLOMAX) 0.4 MG capsule 24 hr capsule, Take 1 capsule by mouth Daily., Disp: 90 capsule, Rfl: 1    Review of Systems   Constitutional: Negative.    HENT: Negative.     Eyes: Negative.    Respiratory: Negative.     Cardiovascular: Negative.    Gastrointestinal: Negative.    Endocrine: Negative.    Genitourinary: Negative.    Musculoskeletal: Negative.    Skin:  Positive for wound.   Allergic/Immunologic: Negative.    Neurological: Negative.    Hematological: Negative.    Psychiatric/Behavioral: Negative.     All other systems reviewed and are negative.    /82   Pulse 88   Ht 182.9 cm (72\")   Wt 132 kg (290 lb)   SpO2 97%   BMI 39.33 kg/m²     Physical Exam  Vitals and nursing note reviewed.   Constitutional:       Appearance: He is well-developed.   HENT:      Head: Normocephalic and atraumatic.   Eyes:      General: No scleral icterus.     Pupils: Pupils are equal, round, and reactive to light.   Neck:      Thyroid: No thyromegaly.      Vascular: No carotid bruit or JVD.   Cardiovascular:      Rate and Rhythm: Normal rate and regular rhythm.      Pulses:           Carotid pulses are 2+ on the right side and 2+ on the left side.       Femoral pulses are 2+ on the right side and 2+ on the left side.       Popliteal pulses are 0 on the right side and 0 on the left side.        Dorsalis pedis pulses are detected w/ Doppler on the right side and detected w/ Doppler on the left side.        " Posterior tibial pulses are detected w/ Doppler on the right side and detected w/ Doppler on the left side.      Heart sounds: Normal heart sounds.      Comments: Wound to left foot  Pulmonary:      Effort: Pulmonary effort is normal.      Breath sounds: Normal breath sounds.   Abdominal:      General: Bowel sounds are normal. There is no distension or abdominal bruit.      Palpations: Abdomen is soft. There is no mass.      Tenderness: There is no abdominal tenderness.   Musculoskeletal:         General: Normal range of motion.      Cervical back: Neck supple.   Lymphadenopathy:      Cervical: No cervical adenopathy.   Skin:     General: Skin is warm and dry.   Neurological:      Mental Status: He is alert and oriented to person, place, and time.      Cranial Nerves: No cranial nerve deficit.      Sensory: No sensory deficit.         Patient Active Problem List   Diagnosis   • Obesity, unspecified obesity severity, unspecified obesity type   • Essential hypertension   • Type 2 diabetes mellitus with hyperglycemia, with long-term current use of insulin   • Nonsmoker   • Anemia due to chronic kidney disease   • Class 3 severe obesity due to excess calories with body mass index (BMI) of 40.0 to 44.9 in adult   • Anasarca   • Sleep apnea with use of continuous positive airway pressure (CPAP)   • Medically noncompliant   • Diabetic ulcer of left midfoot associated with type 2 diabetes mellitus, with fat layer exposed   • Diabetic polyneuropathy associated with type 2 diabetes mellitus   • Spinal stenosis in cervical region   • Degeneration of cervical intervertebral disc   • Cervical radiculopathy   • Degeneration of lumbar or lumbosacral intervertebral disc   • Cervical myelopathy   • Bilateral carpal tunnel syndrome   • CAD (coronary artery disease)   • GERD without esophagitis   • BPH without obstruction/lower urinary tract symptoms   • Stage 3b chronic kidney disease   • Chronic diastolic heart failure   • Type 2  myocardial infarction due to heart failure   • Left carpal tunnel syndrome   • Syncope and collapse, recurrent episodes   • Poorly-controlled hypertension   • Rhinovirus   • Peripheral vascular disease   • Chronic kidney disease (CKD), stage IV (severe)        Diagnosis Plan   1. PAD (peripheral artery disease)  US Ankle / Brachial Indices Extremity Complete      2. Essential hypertension        3. Diabetic ulcer of left midfoot associated with type 2 diabetes mellitus, with fat layer exposed            Plan: After thoroughly evaluating Erick Luong, I believe the best course of action is to initially remain conservative from vascular surgery standpoint.  I did carefully review his arterial duplex which does show adequate flow down to the foot with questionable narrowing of the posterior tibial artery.  Since the wound is currently healing and you have 2 more surgeries planned we can hold off on intervention at this time.  If we find that the wound has stalled out or worsens, then we can always proceed with an angiogram.  Otherwise, I will see him back in 6 months time with an [place] for continued surveillance.  We will check a carotid duplex in 1 year. I did discuss vascular risk factors as they pertain to the progression of vascular disease including controlling hypertension and diabetes mellitus.  The patient is to continue taking their medications as previously discussed. This was all discussed in full with complete understanding.  Thank you for allowing me to participate in the care of your patient.  Please do not hesitate to call with any questions or concerns.  We will keep you aware of any further encounters with Erick Luong.        Sincerely yours,         DO Diogenes Haque Jeffrey L, MD

## 2023-11-21 NOTE — PROGRESS NOTES
11/21/2023      Charley Gomes DPM  [address] [place] [address]    Erick Luong  1955    Chief Complaint   Patient presents with    NEW PATIENT     Referred from Charley Gomes for abnormal arterial duplex. Complains of swelling, pain and numbness in legs.        Dear Charley Gomes DPM    HPI  I had the pleasure of seeing your patient Erick Luong in the office today.  Thank you kindly for this consultation.  As you recall, Erick Luong is a 68 y.o.  male who you are currently following for wound to left foot. Surgery was second week of September.  He went back for 2 other surgeries since that time, last about 4 weeks ago.  He is diabetic.  He is maintained on aspirin and Crestor.  He states that the wound has been healing slowly but is clean and dry.  He did have noninvasive testing performed which I did review in office.       Past Medical History:   Diagnosis Date    Arthritis     Autonomic disease     CAD (coronary artery disease) 02/06/2017    Cervical radiculopathy 09/16/2021    Chronic constipation with acute exaccerbation 05/10/2021    Coronary artery disease     Degeneration of cervical intervertebral disc 08/11/2021    Diabetes mellitus     Diabetic foot ulcer 08/31/2020    Diabetic polyneuropathy associated with type 2 diabetes mellitus 01/18/2021    Elevated cholesterol     Gastroesophageal reflux disease 05/13/2019    Headache     HTN (hypertension), benign 05/03/2017    Hyperlipidemia     Hypertension     Mixed hyperlipidemia 02/07/2017    Multiple lung nodules 01/26/2020    5mm, 9 mm RLL identified 1/2020, not present 10/2019.    Myocardial infarction     Osteomyelitis 01/22/2020    Osteomyelitis of fifth toe of right foot 10/07/2019    Pancreatitis     Persistent insomnia 01/20/2020    Renal disorder     Sleep apnea 02/06/2017    Sleep apnea with use of continuous positive airway pressure (CPAP)     NON-COMPLIANT    Slow transit constipation 01/16/2019    Spinal  stenosis in cervical region 2021    Vitamin D deficiency 2021       Past Surgical History:   Procedure Laterality Date    ABDOMINAL SURGERY      AMPUTATION FOOT / TOE Left 10/2021    5th digit     ANTERIOR CERVICAL DISCECTOMY W/ FUSION N/A 2022    Procedure: CERVICAL DISCECTOMY ANTERIOR WITH FUSION C5-6 with possible plating of C5-7 with neuromonitoring and 1 c-arm;  Surgeon: Karel Soliz MD;  Location:  [facility] OR;  Service: Neurosurgery;  Laterality: N/A;    APPENDECTOMY      BACK SURGERY      CARDIAC CATHETERIZATION Left 2021    Procedure: Left Heart Cath w poss intervention left anatomical snuff box acess;  Surgeon: Omkar Charles DO;  Location:  [facility] CATH INVASIVE LOCATION;  Service: Cardiology;  Laterality: Left;    CARDIAC SURGERY      CATARACT EXTRACTION      CERVICAL SPINE SURGERY      COLONOSCOPY N/A 2017    Normal exam repeat in 5 years    COLONOSCOPY N/A 2019    Mild acute inflammation    COLONOSCOPY W/ POLYPECTOMY  2014    Hyperplastic polyp    CORONARY ARTERY BYPASS GRAFT  10/2015    ENDOSCOPY  2011    Gastritis with hemorrhage    ENDOSCOPY N/A 2017    Normal exam    ENDOSCOPY N/A 2019    Gastritis    ENDOSCOPY N/A 2020    Non-erosive gastritis with hemorrhage    ENDOSCOPY N/A 02/10/2021    Esophagitis    FOOT SURGERY Left     INCISION AND DRAINAGE OF WOUND Left 2007    spider bite       Family History   Problem Relation Age of Onset    Colon cancer Father     Heart disease Father     Colon cancer Sister     Colon polyps Sister     Alzheimer's disease Mother     Coronary artery disease Sister     Coronary artery disease Sister        Social History     Socioeconomic History    Marital status:    Tobacco Use    Smoking status: Former     Types: Cigarettes     Quit date:      Years since quittin.9    Smokeless tobacco: Never    Tobacco comments:     smoked in [place]   Vaping Use    Vaping Use: Never  "used   Substance and Sexual Activity    Alcohol use: No    Drug use: No    Sexual activity: Defer       Allergies   Allergen Reactions    Cefepime Hives and Anaphylaxis    Bactrim [Sulfamethoxazole-Trimethoprim] Other (See Comments)     \"RENAL FAILURE\"    Vancomycin Itching    Zolpidem Unknown - High Severity     \"makes him crazy\"    Zolpidem Tartrate Unknown - Low Severity and Provider Review Needed    Metronidazole Rash         Current Outpatient Medications:     amitriptyline (ELAVIL) 25 MG tablet, Take 2 tablets by mouth Daily at bedtime., Disp: 60 tablet, Rfl: 1    amoxicillin-clavulanate (Augmentin) 500-125 MG per tablet, Take 1 tablet by mouth every 12 hours with meals for 7 days., Disp: 14 tablet, Rfl: 0    ascorbic acid (VITAMIN C) 1000 MG tablet, Take 1 tablet by mouth Daily., Disp: 30 tablet, Rfl: 3    Aspirin 81 MG capsule, Take 81 mg by mouth Daily., Disp: , Rfl:     bumetanide (BUMEX) 2 MG tablet, Take 1 tablet by mouth Daily. Take 2 tablets in morning;1 tablet at night, Disp: , Rfl:     busPIRone (BUSPAR) 10 MG tablet, Take 1 tablet by mouth 2 (Two) Times a Day As Needed., Disp: 100 tablet, Rfl: 3    calcitriol (ROCALTROL) 0.5 MCG capsule, Take 1 capsule by mouth Daily., Disp: 90 capsule, Rfl: 4    carvedilol (COREG) 6.25 MG tablet, Take 1 tablet by mouth 2 (Two) Times a Day., Disp: 180 tablet, Rfl: 4    Cholecalciferol (D-5000) 125 MCG (5000 UT) tablet, Take 1 tablet by mouth Daily Before Lunch., Disp: 30 tablet, Rfl: 2    cloNIDine (CATAPRES) 0.1 MG tablet, Take 1 tablet by mouth Every 12 (Twelve) Hours., Disp: 60 tablet, Rfl: 2    Diclofenac Sodium (VOLTAREN) 1 % gel gel, Apply 2 g topically to the appropriate area as directed 4 (Four) Times a Day As Needed., Disp: 300 g, Rfl: 11    donepezil (ARICEPT) 10 MG tablet, Take 1 tablet by mouth Daily., Disp: 90 tablet, Rfl: 4    Dulaglutide (Trulicity) 4.5 MG/0.5ML solution pen-injector, Inject 0.5 mL under the skin into the appropriate area as directed " 1 (One) Time Per Week., Disp: 2 mL, Rfl: 11    empagliflozin (JARDIANCE) 25 MG tablet tablet, Take 1 tablet by mouth Daily., Disp: 30 tablet, Rfl: 2    HYDROcodone-acetaminophen (NORCO) 7.5-325 MG per tablet, Take 1 tablet by mouth 2 (Two) Times a Day As Needed., Disp: 40 tablet, Rfl: 0    Insulin Regular Human, Conc, (HumuLIN R U-500 KwikPen) 500 UNIT/ML solution pen-injector CONCENTRATED injection, Inject 120 Units under the skin into the appropriate area as directed 2 (Two) Times a Day with breakfast and dinner. (Patient taking differently: Inject 120 Units under the skin into the appropriate area as directed 3 (Three) Times a Day Before Meals.), Disp: 18 mL, Rfl: 5    midodrine (PROAMATINE) 10 MG tablet, Take 1 tablet by mouth 3 (Three) Times a Day., Disp: 270 tablet, Rfl: 4    ondansetron ODT (ZOFRAN-ODT) 8 MG disintegrating tablet, Place 1 tablet under tongue 3 times a day as needed., Disp: 25 tablet, Rfl: 1    pantoprazole (Protonix) 40 MG EC tablet, Take 1 tablet by mouth 2 (Two) Times a Day., Disp: 180 tablet, Rfl: 4    polyethylene glycol (MIRALAX) 17 g packet, Take 17 g by mouth Daily. Obtain OTC, Disp: , Rfl:     pregabalin (LYRICA) 100 MG capsule, Take 1 capsule by mouth Daily With Breakfast AND 2 capsules Every Night., Disp: 90 capsule, Rfl: 2    rosuvastatin (CRESTOR) 40 MG tablet, Take 1 tablet by mouth Every Night., Disp: 90 tablet, Rfl: 3    sennosides-docusate (PERICOLACE) 8.6-50 MG per tablet, Take 1 tablet by mouth Every Night. Obtain OTC, Disp: , Rfl:     sennosides-docusate (PERICOLACE) 8.6-50 MG per tablet, Take 1 tablet by mouth every night at bedtime., Disp: 30 tablet, Rfl: 2    sodium hypochlorite (DAKIN'S 1/4 STRENGTH) 0.125 % solution topical solution 0.125%, Apply to affected area twice daily, Disp: 473 mL, Rfl: 3    tamsulosin (FLOMAX) 0.4 MG capsule 24 hr capsule, Take 1 capsule by mouth Daily., Disp: 90 capsule, Rfl: 1    Review of Systems   Constitutional: Negative.    HENT:  "Negative.     Eyes: Negative.    Respiratory: Negative.     Cardiovascular: Negative.    Gastrointestinal: Negative.    Endocrine: Negative.    Genitourinary: Negative.    Musculoskeletal: Negative.    Skin:  Positive for wound.   Allergic/Immunologic: Negative.    Neurological: Negative.    Hematological: Negative.    Psychiatric/Behavioral: Negative.     All other systems reviewed and are negative.    /82   Pulse 88   Ht 182.9 cm (72\")   Wt 132 kg (290 lb)   SpO2 97%   BMI 39.33 kg/m²     Physical Exam  Vitals and nursing note reviewed.   Constitutional:       Appearance: He is well-developed.   HENT:      Head: Normocephalic and atraumatic.   Eyes:      General: No scleral icterus.     Pupils: Pupils are equal, round, and reactive to light.   Neck:      Thyroid: No thyromegaly.      Vascular: No carotid bruit or JVD.   Cardiovascular:      Rate and Rhythm: Normal rate and regular rhythm.      Pulses:           Carotid pulses are 2+ on the right side and 2+ on the left side.       Femoral pulses are 2+ on the right side and 2+ on the left side.       Popliteal pulses are 0 on the right side and 0 on the left side.        Dorsalis pedis pulses are detected w/ Doppler on the right side and detected w/ Doppler on the left side.        Posterior tibial pulses are detected w/ Doppler on the right side and detected w/ Doppler on the left side.      Heart sounds: Normal heart sounds.      Comments: Wound to left foot  Pulmonary:      Effort: Pulmonary effort is normal.      Breath sounds: Normal breath sounds.   Abdominal:      General: Bowel sounds are normal. There is no distension or abdominal bruit.      Palpations: Abdomen is soft. There is no mass.      Tenderness: There is no abdominal tenderness.   Musculoskeletal:         General: Normal range of motion.      Cervical back: Neck supple.   Lymphadenopathy:      Cervical: No cervical adenopathy.   Skin:     General: Skin is warm and dry.   Neurological: "      Mental Status: He is alert and oriented to person, place, and time.      Cranial Nerves: No cranial nerve deficit.      Sensory: No sensory deficit.         Patient Active Problem List   Diagnosis    Obesity, unspecified obesity severity, unspecified obesity type    Essential hypertension    Type 2 diabetes mellitus with hyperglycemia, with long-term current use of insulin    Nonsmoker    Anemia due to chronic kidney disease    Class 3 severe obesity due to excess calories with body mass index (BMI) of 40.0 to 44.9 in adult    Anasarca    Sleep apnea with use of continuous positive airway pressure (CPAP)    Medically noncompliant    Diabetic ulcer of left midfoot associated with type 2 diabetes mellitus, with fat layer exposed    Diabetic polyneuropathy associated with type 2 diabetes mellitus    Spinal stenosis in cervical region    Degeneration of cervical intervertebral disc    Cervical radiculopathy    Degeneration of lumbar or lumbosacral intervertebral disc    Cervical myelopathy    Bilateral carpal tunnel syndrome    CAD (coronary artery disease)    GERD without esophagitis    BPH without obstruction/lower urinary tract symptoms    Stage 3b chronic kidney disease    Chronic diastolic heart failure    Type 2 myocardial infarction due to heart failure    Left carpal tunnel syndrome    Syncope and collapse, recurrent episodes    Poorly-controlled hypertension    Rhinovirus    Peripheral vascular disease    Chronic kidney disease (CKD), stage IV (severe)        Diagnosis Plan   1. PAD (peripheral artery disease)  US Ankle / Brachial Indices Extremity Complete      2. Essential hypertension        3. Diabetic ulcer of left midfoot associated with type 2 diabetes mellitus, with fat layer exposed            Plan: After thoroughly evaluating Erick Luong, I believe the best course of action is to initially remain conservative from vascular surgery standpoint.  I did carefully review his arterial duplex which  does show adequate flow down to the foot with questionable narrowing of the posterior tibial artery.  Since the wound is currently healing and you have 2 more surgeries planned we can hold off on intervention at this time.  If we find that the wound has stalled out or worsens, then we can always proceed with an angiogram.  Otherwise, I will see him back in 6 months time with an [place] for continued surveillance.  We will check a carotid duplex in 1 year. I did discuss vascular risk factors as they pertain to the progression of vascular disease including controlling hypertension and diabetes mellitus.  The patient is to continue taking their medications as previously discussed. This was all discussed in full with complete understanding.  Thank you for allowing me to participate in the care of your patient.  Please do not hesitate to call with any questions or concerns.  We will keep you aware of any further encounters with Erick Luong.        Sincerely yours,         DO Diogenes Haque Jeffrey L, MD

## 2023-11-22 ENCOUNTER — PATIENT ROUNDING (BHMG ONLY) (OUTPATIENT)
Dept: VASCULAR SURGERY | Facility: CLINIC | Age: 68
End: 2023-11-22
Payer: COMMERCIAL

## 2023-11-22 NOTE — PROGRESS NOTES
November 22, 2023    Hello, may I speak with Erick Luong?    My name is ELIZABETH      I am  with [facility]  [address], [address]  [place] [address] [phone].    Before we get started may I verify your date of birth? 1955    I am calling to officially welcome you to our practice and ask about your recent visit. Is this a good time to talk? yes    Tell me about your visit with us. What things went well?  VERY GOOD VISIT WAS VERY IMPRESSED       We're always looking for ways to make our patients' experiences even better. Do you have recommendations on ways we may improve?  no    Overall were you satisfied with your first visit to our practice? yes       I appreciate you taking the time to speak with me today. Is there anything else I can do for you? no      Thank you, and have a great day.

## 2024-03-11 ENCOUNTER — TRANSCRIBE ORDERS (OUTPATIENT)
Dept: ADMINISTRATIVE | Facility: HOSPITAL | Age: 69
End: 2024-03-11
Payer: COMMERCIAL

## 2024-03-11 DIAGNOSIS — E11.65 TYPE 2 DIABETES MELLITUS WITH HYPERGLYCEMIA, UNSPECIFIED WHETHER LONG TERM INSULIN USE: Primary | ICD-10-CM

## 2024-03-26 ENCOUNTER — APPOINTMENT (OUTPATIENT)
Dept: GENERAL RADIOLOGY | Facility: HOSPITAL | Age: 69
DRG: 871 | End: 2024-03-26
Payer: COMMERCIAL

## 2024-03-26 ENCOUNTER — APPOINTMENT (OUTPATIENT)
Dept: CT IMAGING | Facility: HOSPITAL | Age: 69
DRG: 871 | End: 2024-03-26
Payer: COMMERCIAL

## 2024-03-26 ENCOUNTER — HOSPITAL ENCOUNTER (INPATIENT)
Facility: HOSPITAL | Age: 69
LOS: 16 days | Discharge: HOME HEALTH | DRG: 871 | End: 2024-04-11
Attending: EMERGENCY MEDICINE | Admitting: INTERNAL MEDICINE
Payer: COMMERCIAL

## 2024-03-26 DIAGNOSIS — N18.4 CHRONIC KIDNEY DISEASE (CKD), STAGE IV (SEVERE): ICD-10-CM

## 2024-03-26 DIAGNOSIS — K62.5 RECTAL BLEEDING: ICD-10-CM

## 2024-03-26 DIAGNOSIS — R65.20 SEPSIS DUE TO METHICILLIN RESISTANT STAPHYLOCOCCUS AUREUS (MRSA) WITH ENCEPHALOPATHY WITHOUT SEPTIC SHOCK: ICD-10-CM

## 2024-03-26 DIAGNOSIS — R10.13 EPIGASTRIC PAIN: ICD-10-CM

## 2024-03-26 DIAGNOSIS — Z74.09 IMPAIRED FUNCTIONAL MOBILITY AND ACTIVITY TOLERANCE: ICD-10-CM

## 2024-03-26 DIAGNOSIS — N18.30 ANEMIA DUE TO STAGE 3 CHRONIC KIDNEY DISEASE, UNSPECIFIED WHETHER STAGE 3A OR 3B CKD: ICD-10-CM

## 2024-03-26 DIAGNOSIS — A41.02 SEPSIS DUE TO METHICILLIN RESISTANT STAPHYLOCOCCUS AUREUS (MRSA) WITH ENCEPHALOPATHY WITHOUT SEPTIC SHOCK: ICD-10-CM

## 2024-03-26 DIAGNOSIS — E11.65 POORLY CONTROLLED DIABETES MELLITUS: ICD-10-CM

## 2024-03-26 DIAGNOSIS — D63.1 ANEMIA DUE TO STAGE 3 CHRONIC KIDNEY DISEASE, UNSPECIFIED WHETHER STAGE 3A OR 3B CKD: ICD-10-CM

## 2024-03-26 DIAGNOSIS — G93.41 SEPSIS DUE TO METHICILLIN RESISTANT STAPHYLOCOCCUS AUREUS (MRSA) WITH ENCEPHALOPATHY WITHOUT SEPTIC SHOCK: ICD-10-CM

## 2024-03-26 DIAGNOSIS — K21.9 GERD WITHOUT ESOPHAGITIS: ICD-10-CM

## 2024-03-26 DIAGNOSIS — L08.9 DIABETIC FOOT INFECTION: Primary | ICD-10-CM

## 2024-03-26 DIAGNOSIS — E11.628 DIABETIC FOOT INFECTION: Primary | ICD-10-CM

## 2024-03-26 PROBLEM — A41.9 SEPSIS: Status: ACTIVE | Noted: 2024-03-26

## 2024-03-26 LAB
ALBUMIN SERPL-MCNC: 3.7 G/DL (ref 3.5–5.2)
ALBUMIN/GLOB SERPL: 1.3 G/DL
ALP SERPL-CCNC: 91 U/L (ref 39–117)
ALT SERPL W P-5'-P-CCNC: 8 U/L (ref 1–41)
ANION GAP SERPL CALCULATED.3IONS-SCNC: 14 MMOL/L (ref 5–15)
AST SERPL-CCNC: 13 U/L (ref 1–40)
B PARAPERT DNA SPEC QL NAA+PROBE: NOT DETECTED
B PERT DNA SPEC QL NAA+PROBE: NOT DETECTED
BACTERIA UR QL AUTO: ABNORMAL /HPF
BASOPHILS # BLD AUTO: 0.03 10*3/MM3 (ref 0–0.2)
BASOPHILS NFR BLD AUTO: 0.2 % (ref 0–1.5)
BILIRUB SERPL-MCNC: 0.7 MG/DL (ref 0–1.2)
BILIRUB UR QL STRIP: NEGATIVE
BUN SERPL-MCNC: 29 MG/DL (ref 8–23)
BUN/CREAT SERPL: 15 (ref 7–25)
C PNEUM DNA NPH QL NAA+NON-PROBE: NOT DETECTED
CALCIUM SPEC-SCNC: 9.5 MG/DL (ref 8.6–10.5)
CHLORIDE SERPL-SCNC: 103 MMOL/L (ref 98–107)
CLARITY UR: CLEAR
CO2 SERPL-SCNC: 23 MMOL/L (ref 22–29)
COLOR UR: YELLOW
CREAT SERPL-MCNC: 1.93 MG/DL (ref 0.76–1.27)
CRP SERPL-MCNC: 4.3 MG/DL (ref 0–0.5)
D-LACTATE SERPL-SCNC: 2.8 MMOL/L (ref 0.5–2)
D-LACTATE SERPL-SCNC: 3.4 MMOL/L (ref 0.5–2)
D-LACTATE SERPL-SCNC: 3.6 MMOL/L (ref 0.5–2)
D-LACTATE SERPL-SCNC: 3.9 MMOL/L (ref 0.5–2)
DEPRECATED RDW RBC AUTO: 44.8 FL (ref 37–54)
EGFRCR SERPLBLD CKD-EPI 2021: 37.5 ML/MIN/1.73
EOSINOPHIL # BLD AUTO: 0.01 10*3/MM3 (ref 0–0.4)
EOSINOPHIL NFR BLD AUTO: 0.1 % (ref 0.3–6.2)
ERYTHROCYTE [DISTWIDTH] IN BLOOD BY AUTOMATED COUNT: 14.4 % (ref 12.3–15.4)
ERYTHROCYTE [SEDIMENTATION RATE] IN BLOOD: 39 MM/HR (ref 0–20)
FLUAV SUBTYP SPEC NAA+PROBE: NOT DETECTED
FLUBV RNA ISLT QL NAA+PROBE: NOT DETECTED
GLOBULIN UR ELPH-MCNC: 2.8 GM/DL
GLUCOSE BLDC GLUCOMTR-MCNC: 156 MG/DL (ref 70–130)
GLUCOSE BLDC GLUCOMTR-MCNC: 241 MG/DL (ref 70–130)
GLUCOSE BLDC GLUCOMTR-MCNC: 400 MG/DL (ref 70–130)
GLUCOSE SERPL-MCNC: 438 MG/DL (ref 65–99)
GLUCOSE UR STRIP-MCNC: ABNORMAL MG/DL
HADV DNA SPEC NAA+PROBE: NOT DETECTED
HCOV 229E RNA SPEC QL NAA+PROBE: NOT DETECTED
HCOV HKU1 RNA SPEC QL NAA+PROBE: NOT DETECTED
HCOV NL63 RNA SPEC QL NAA+PROBE: NOT DETECTED
HCOV OC43 RNA SPEC QL NAA+PROBE: NOT DETECTED
HCT VFR BLD AUTO: 33.8 % (ref 37.5–51)
HGB BLD-MCNC: 11.3 G/DL (ref 13–17.7)
HGB UR QL STRIP.AUTO: ABNORMAL
HMPV RNA NPH QL NAA+NON-PROBE: NOT DETECTED
HOLD SPECIMEN: NORMAL
HOLD SPECIMEN: NORMAL
HPIV1 RNA ISLT QL NAA+PROBE: NOT DETECTED
HPIV2 RNA SPEC QL NAA+PROBE: NOT DETECTED
HPIV3 RNA NPH QL NAA+PROBE: NOT DETECTED
HPIV4 P GENE NPH QL NAA+PROBE: NOT DETECTED
HYALINE CASTS UR QL AUTO: ABNORMAL /LPF
IMM GRANULOCYTES # BLD AUTO: 0.12 10*3/MM3 (ref 0–0.05)
IMM GRANULOCYTES NFR BLD AUTO: 0.8 % (ref 0–0.5)
INR PPP: 1.07 (ref 0.91–1.09)
KETONES UR QL STRIP: ABNORMAL
LEUKOCYTE ESTERASE UR QL STRIP.AUTO: NEGATIVE
LYMPHOCYTES # BLD AUTO: 0.25 10*3/MM3 (ref 0.7–3.1)
LYMPHOCYTES NFR BLD AUTO: 1.7 % (ref 19.6–45.3)
M PNEUMO IGG SER IA-ACNC: NOT DETECTED
MAGNESIUM SERPL-MCNC: 1.7 MG/DL (ref 1.6–2.4)
MCH RBC QN AUTO: 29 PG (ref 26.6–33)
MCHC RBC AUTO-ENTMCNC: 33.4 G/DL (ref 31.5–35.7)
MCV RBC AUTO: 86.7 FL (ref 79–97)
MONOCYTES # BLD AUTO: 1.19 10*3/MM3 (ref 0.1–0.9)
MONOCYTES NFR BLD AUTO: 8.1 % (ref 5–12)
NEUTROPHILS NFR BLD AUTO: 13.09 10*3/MM3 (ref 1.7–7)
NEUTROPHILS NFR BLD AUTO: 89.1 % (ref 42.7–76)
NITRITE UR QL STRIP: NEGATIVE
NRBC BLD AUTO-RTO: 0 /100 WBC (ref 0–0.2)
PH UR STRIP.AUTO: 5.5 [PH] (ref 5–8)
PLATELET # BLD AUTO: 171 10*3/MM3 (ref 140–450)
PMV BLD AUTO: 12.4 FL (ref 6–12)
POTASSIUM SERPL-SCNC: 3.4 MMOL/L (ref 3.5–5.2)
PROCALCITONIN SERPL-MCNC: 6.56 NG/ML (ref 0–0.25)
PROT SERPL-MCNC: 6.5 G/DL (ref 6–8.5)
PROT UR QL STRIP: ABNORMAL
PROTHROMBIN TIME: 14.3 SECONDS (ref 11.8–14.8)
RBC # BLD AUTO: 3.9 10*6/MM3 (ref 4.14–5.8)
RBC # UR STRIP: ABNORMAL /HPF
REF LAB TEST METHOD: ABNORMAL
RHINOVIRUS RNA SPEC NAA+PROBE: NOT DETECTED
RSV RNA NPH QL NAA+NON-PROBE: NOT DETECTED
SARS-COV-2 RNA NPH QL NAA+NON-PROBE: NOT DETECTED
SODIUM SERPL-SCNC: 140 MMOL/L (ref 136–145)
SP GR UR STRIP: 1.02 (ref 1–1.03)
SQUAMOUS #/AREA URNS HPF: ABNORMAL /HPF
UROBILINOGEN UR QL STRIP: ABNORMAL
WBC # UR STRIP: ABNORMAL /HPF
WBC NRBC COR # BLD AUTO: 14.69 10*3/MM3 (ref 3.4–10.8)
WHOLE BLOOD HOLD COAG: NORMAL
WHOLE BLOOD HOLD SPECIMEN: NORMAL

## 2024-03-26 PROCEDURE — 93010 ELECTROCARDIOGRAM REPORT: CPT | Performed by: EMERGENCY MEDICINE

## 2024-03-26 PROCEDURE — 25010000002 HALOPERIDOL LACTATE PER 5 MG: Performed by: STUDENT IN AN ORGANIZED HEALTH CARE EDUCATION/TRAINING PROGRAM

## 2024-03-26 PROCEDURE — 25010000002 MEROPENEM PER 100 MG: Performed by: NURSE PRACTITIONER

## 2024-03-26 PROCEDURE — 83735 ASSAY OF MAGNESIUM: CPT | Performed by: EMERGENCY MEDICINE

## 2024-03-26 PROCEDURE — 70450 CT HEAD/BRAIN W/O DYE: CPT

## 2024-03-26 PROCEDURE — 82948 REAGENT STRIP/BLOOD GLUCOSE: CPT

## 2024-03-26 PROCEDURE — 85025 COMPLETE CBC W/AUTO DIFF WBC: CPT | Performed by: EMERGENCY MEDICINE

## 2024-03-26 PROCEDURE — 25810000003 LACTATED RINGERS SOLUTION: Performed by: EMERGENCY MEDICINE

## 2024-03-26 PROCEDURE — 87147 CULTURE TYPE IMMUNOLOGIC: CPT | Performed by: EMERGENCY MEDICINE

## 2024-03-26 PROCEDURE — 63710000001 INSULIN LISPRO (HUMAN) PER 5 UNITS: Performed by: NURSE PRACTITIONER

## 2024-03-26 PROCEDURE — 85652 RBC SED RATE AUTOMATED: CPT | Performed by: EMERGENCY MEDICINE

## 2024-03-26 PROCEDURE — 36415 COLL VENOUS BLD VENIPUNCTURE: CPT | Performed by: EMERGENCY MEDICINE

## 2024-03-26 PROCEDURE — 73630 X-RAY EXAM OF FOOT: CPT

## 2024-03-26 PROCEDURE — 25010000002 CLINDAMYCIN 600 MG/50ML SOLUTION: Performed by: EMERGENCY MEDICINE

## 2024-03-26 PROCEDURE — 81001 URINALYSIS AUTO W/SCOPE: CPT | Performed by: EMERGENCY MEDICINE

## 2024-03-26 PROCEDURE — 25010000002 LORAZEPAM PER 2 MG: Performed by: STUDENT IN AN ORGANIZED HEALTH CARE EDUCATION/TRAINING PROGRAM

## 2024-03-26 PROCEDURE — 93005 ELECTROCARDIOGRAM TRACING: CPT | Performed by: EMERGENCY MEDICINE

## 2024-03-26 PROCEDURE — 83605 ASSAY OF LACTIC ACID: CPT | Performed by: EMERGENCY MEDICINE

## 2024-03-26 PROCEDURE — 63710000001 INSULIN REGULAR HUMAN PER 5 UNITS: Performed by: EMERGENCY MEDICINE

## 2024-03-26 PROCEDURE — 25810000003 SODIUM CHLORIDE 0.9 % SOLUTION: Performed by: NURSE PRACTITIONER

## 2024-03-26 PROCEDURE — 87154 CUL TYP ID BLD PTHGN 6+ TRGT: CPT | Performed by: EMERGENCY MEDICINE

## 2024-03-26 PROCEDURE — 86140 C-REACTIVE PROTEIN: CPT | Performed by: EMERGENCY MEDICINE

## 2024-03-26 PROCEDURE — 93005 ELECTROCARDIOGRAM TRACING: CPT | Performed by: NURSE PRACTITIONER

## 2024-03-26 PROCEDURE — 25010000002 DIPHENHYDRAMINE PER 50 MG: Performed by: NURSE PRACTITIONER

## 2024-03-26 PROCEDURE — 25010000002 ZIPRASIDONE MESYLATE PER 10 MG: Performed by: STUDENT IN AN ORGANIZED HEALTH CARE EDUCATION/TRAINING PROGRAM

## 2024-03-26 PROCEDURE — 85610 PROTHROMBIN TIME: CPT | Performed by: EMERGENCY MEDICINE

## 2024-03-26 PROCEDURE — 0202U NFCT DS 22 TRGT SARS-COV-2: CPT | Performed by: EMERGENCY MEDICINE

## 2024-03-26 PROCEDURE — 99285 EMERGENCY DEPT VISIT HI MDM: CPT

## 2024-03-26 PROCEDURE — 87186 SC STD MICRODIL/AGAR DIL: CPT | Performed by: EMERGENCY MEDICINE

## 2024-03-26 PROCEDURE — 25010000002 DIPHENHYDRAMINE PER 50 MG: Performed by: STUDENT IN AN ORGANIZED HEALTH CARE EDUCATION/TRAINING PROGRAM

## 2024-03-26 PROCEDURE — 63710000001 INSULIN DETEMIR PER 5 UNITS: Performed by: NURSE PRACTITIONER

## 2024-03-26 PROCEDURE — 84145 PROCALCITONIN (PCT): CPT | Performed by: EMERGENCY MEDICINE

## 2024-03-26 PROCEDURE — P9612 CATHETERIZE FOR URINE SPEC: HCPCS

## 2024-03-26 PROCEDURE — 71045 X-RAY EXAM CHEST 1 VIEW: CPT

## 2024-03-26 PROCEDURE — 80053 COMPREHEN METABOLIC PANEL: CPT | Performed by: EMERGENCY MEDICINE

## 2024-03-26 RX ORDER — CARVEDILOL 6.25 MG/1
6.25 TABLET ORAL 2 TIMES DAILY
Status: DISCONTINUED | OUTPATIENT
Start: 2024-03-26 | End: 2024-03-28

## 2024-03-26 RX ORDER — DEXTROSE MONOHYDRATE 25 G/50ML
25 INJECTION, SOLUTION INTRAVENOUS
Status: DISCONTINUED | OUTPATIENT
Start: 2024-03-26 | End: 2024-04-11 | Stop reason: HOSPADM

## 2024-03-26 RX ORDER — OXYCODONE HYDROCHLORIDE 5 MG/1
10 TABLET ORAL 2 TIMES DAILY PRN
Status: DISCONTINUED | OUTPATIENT
Start: 2024-03-26 | End: 2024-04-11 | Stop reason: HOSPADM

## 2024-03-26 RX ORDER — SODIUM HYPOCHLORITE 1.25 MG/ML
SOLUTION TOPICAL 2 TIMES DAILY
Status: DISCONTINUED | OUTPATIENT
Start: 2024-03-26 | End: 2024-03-28

## 2024-03-26 RX ORDER — ACETAMINOPHEN 160 MG/5ML
650 SOLUTION ORAL EVERY 4 HOURS PRN
Status: DISCONTINUED | OUTPATIENT
Start: 2024-03-26 | End: 2024-04-11 | Stop reason: HOSPADM

## 2024-03-26 RX ORDER — SODIUM CHLORIDE 9 MG/ML
40 INJECTION, SOLUTION INTRAVENOUS AS NEEDED
Status: DISCONTINUED | OUTPATIENT
Start: 2024-03-26 | End: 2024-04-11 | Stop reason: HOSPADM

## 2024-03-26 RX ORDER — SODIUM CHLORIDE 0.9 % (FLUSH) 0.9 %
10 SYRINGE (ML) INJECTION AS NEEDED
Status: DISCONTINUED | OUTPATIENT
Start: 2024-03-26 | End: 2024-04-11 | Stop reason: HOSPADM

## 2024-03-26 RX ORDER — BISACODYL 10 MG
10 SUPPOSITORY, RECTAL RECTAL DAILY PRN
Status: DISCONTINUED | OUTPATIENT
Start: 2024-03-26 | End: 2024-04-04

## 2024-03-26 RX ORDER — DULOXETIN HYDROCHLORIDE 30 MG/1
60 CAPSULE, DELAYED RELEASE ORAL DAILY
Status: DISCONTINUED | OUTPATIENT
Start: 2024-03-26 | End: 2024-03-26

## 2024-03-26 RX ORDER — ACETAMINOPHEN 650 MG/1
650 SUPPOSITORY RECTAL EVERY 4 HOURS PRN
Status: DISCONTINUED | OUTPATIENT
Start: 2024-03-26 | End: 2024-04-11 | Stop reason: HOSPADM

## 2024-03-26 RX ORDER — DIPHENHYDRAMINE HYDROCHLORIDE 50 MG/ML
50 INJECTION INTRAMUSCULAR; INTRAVENOUS ONCE
Status: COMPLETED | OUTPATIENT
Start: 2024-03-26 | End: 2024-03-26

## 2024-03-26 RX ORDER — LORAZEPAM 2 MG/ML
2 INJECTION INTRAMUSCULAR ONCE
Status: COMPLETED | OUTPATIENT
Start: 2024-03-26 | End: 2024-03-26

## 2024-03-26 RX ORDER — ONDANSETRON 4 MG/1
4 TABLET, ORALLY DISINTEGRATING ORAL EVERY 6 HOURS PRN
Status: DISCONTINUED | OUTPATIENT
Start: 2024-03-26 | End: 2024-04-11 | Stop reason: HOSPADM

## 2024-03-26 RX ORDER — HALOPERIDOL 5 MG/ML
5 INJECTION INTRAMUSCULAR ONCE
Status: COMPLETED | OUTPATIENT
Start: 2024-03-26 | End: 2024-03-26

## 2024-03-26 RX ORDER — INSULIN LISPRO 100 [IU]/ML
4-24 INJECTION, SOLUTION INTRAVENOUS; SUBCUTANEOUS
Status: DISCONTINUED | OUTPATIENT
Start: 2024-03-26 | End: 2024-04-11 | Stop reason: HOSPADM

## 2024-03-26 RX ORDER — PANTOPRAZOLE SODIUM 40 MG/1
40 TABLET, DELAYED RELEASE ORAL 2 TIMES DAILY
Status: DISCONTINUED | OUTPATIENT
Start: 2024-03-26 | End: 2024-03-28

## 2024-03-26 RX ORDER — NITROGLYCERIN 0.4 MG/1
0.4 TABLET SUBLINGUAL
Status: DISCONTINUED | OUTPATIENT
Start: 2024-03-26 | End: 2024-04-11 | Stop reason: HOSPADM

## 2024-03-26 RX ORDER — IBUPROFEN 600 MG/1
1 TABLET ORAL
Status: DISCONTINUED | OUTPATIENT
Start: 2024-03-26 | End: 2024-04-11 | Stop reason: HOSPADM

## 2024-03-26 RX ORDER — AMOXICILLIN 250 MG
1 CAPSULE ORAL 2 TIMES DAILY
Status: DISCONTINUED | OUTPATIENT
Start: 2024-03-26 | End: 2024-04-04

## 2024-03-26 RX ORDER — BUMETANIDE 1 MG/1
1 TABLET ORAL DAILY
Status: DISCONTINUED | OUTPATIENT
Start: 2024-03-26 | End: 2024-03-30

## 2024-03-26 RX ORDER — DIPHENHYDRAMINE HYDROCHLORIDE 50 MG/ML
25 INJECTION INTRAMUSCULAR; INTRAVENOUS ONCE
Status: COMPLETED | OUTPATIENT
Start: 2024-03-26 | End: 2024-03-26

## 2024-03-26 RX ORDER — POLYETHYLENE GLYCOL 3350 17 G/17G
17 POWDER, FOR SOLUTION ORAL DAILY PRN
Status: DISCONTINUED | OUTPATIENT
Start: 2024-03-26 | End: 2024-04-04

## 2024-03-26 RX ORDER — BUSPIRONE HYDROCHLORIDE 10 MG/1
10 TABLET ORAL 2 TIMES DAILY PRN
Status: DISCONTINUED | OUTPATIENT
Start: 2024-03-26 | End: 2024-03-26

## 2024-03-26 RX ORDER — LINEZOLID 2 MG/ML
600 INJECTION, SOLUTION INTRAVENOUS EVERY 12 HOURS
Qty: 4200 ML | Refills: 0 | Status: DISCONTINUED | OUTPATIENT
Start: 2024-03-27 | End: 2024-04-05

## 2024-03-26 RX ORDER — CLINDAMYCIN PHOSPHATE 600 MG/50ML
600 INJECTION, SOLUTION INTRAVENOUS ONCE
Status: COMPLETED | OUTPATIENT
Start: 2024-03-26 | End: 2024-03-26

## 2024-03-26 RX ORDER — SODIUM CHLORIDE 0.9 % (FLUSH) 0.9 %
10 SYRINGE (ML) INJECTION EVERY 12 HOURS SCHEDULED
Status: DISCONTINUED | OUTPATIENT
Start: 2024-03-26 | End: 2024-04-11 | Stop reason: HOSPADM

## 2024-03-26 RX ORDER — AMITRIPTYLINE HYDROCHLORIDE 25 MG/1
50 TABLET, FILM COATED ORAL DAILY
Status: DISCONTINUED | OUTPATIENT
Start: 2024-03-26 | End: 2024-03-26

## 2024-03-26 RX ORDER — ZIPRASIDONE MESYLATE 20 MG/ML
20 INJECTION, POWDER, LYOPHILIZED, FOR SOLUTION INTRAMUSCULAR EVERY 4 HOURS PRN
Status: DISCONTINUED | OUTPATIENT
Start: 2024-03-26 | End: 2024-03-27

## 2024-03-26 RX ORDER — DONEPEZIL HYDROCHLORIDE 10 MG/1
10 TABLET, FILM COATED ORAL DAILY
Status: DISCONTINUED | OUTPATIENT
Start: 2024-03-27 | End: 2024-04-11 | Stop reason: HOSPADM

## 2024-03-26 RX ORDER — NICOTINE POLACRILEX 4 MG
15 LOZENGE BUCCAL
Status: DISCONTINUED | OUTPATIENT
Start: 2024-03-26 | End: 2024-04-11 | Stop reason: HOSPADM

## 2024-03-26 RX ORDER — ACETAMINOPHEN 325 MG/1
650 TABLET ORAL EVERY 4 HOURS PRN
Status: DISCONTINUED | OUTPATIENT
Start: 2024-03-26 | End: 2024-04-11 | Stop reason: HOSPADM

## 2024-03-26 RX ORDER — ZIPRASIDONE MESYLATE 20 MG/ML
20 INJECTION, POWDER, LYOPHILIZED, FOR SOLUTION INTRAMUSCULAR EVERY 4 HOURS PRN
Status: DISCONTINUED | OUTPATIENT
Start: 2024-03-27 | End: 2024-03-26

## 2024-03-26 RX ORDER — SODIUM CHLORIDE 9 MG/ML
100 INJECTION, SOLUTION INTRAVENOUS CONTINUOUS
Status: DISPENSED | OUTPATIENT
Start: 2024-03-26 | End: 2024-03-27

## 2024-03-26 RX ORDER — MIDODRINE HYDROCHLORIDE 2.5 MG/1
10 TABLET ORAL
Status: DISCONTINUED | OUTPATIENT
Start: 2024-03-27 | End: 2024-03-28

## 2024-03-26 RX ORDER — ONDANSETRON 2 MG/ML
4 INJECTION INTRAMUSCULAR; INTRAVENOUS EVERY 6 HOURS PRN
Status: DISCONTINUED | OUTPATIENT
Start: 2024-03-26 | End: 2024-04-11 | Stop reason: HOSPADM

## 2024-03-26 RX ORDER — BISACODYL 5 MG/1
5 TABLET, DELAYED RELEASE ORAL DAILY PRN
Status: DISCONTINUED | OUTPATIENT
Start: 2024-03-26 | End: 2024-04-04

## 2024-03-26 RX ADMIN — BUMETANIDE 1 MG: 1 TABLET ORAL at 20:57

## 2024-03-26 RX ADMIN — CARVEDILOL 6.25 MG: 6.25 TABLET, FILM COATED ORAL at 20:57

## 2024-03-26 RX ADMIN — SODIUM CHLORIDE, POTASSIUM CHLORIDE, SODIUM LACTATE AND CALCIUM CHLORIDE 1000 ML: 600; 310; 30; 20 INJECTION, SOLUTION INTRAVENOUS at 18:31

## 2024-03-26 RX ADMIN — SODIUM CHLORIDE 100 ML/HR: 9 INJECTION, SOLUTION INTRAVENOUS at 18:32

## 2024-03-26 RX ADMIN — ACETAMINOPHEN 650 MG: 325 TABLET, FILM COATED ORAL at 18:37

## 2024-03-26 RX ADMIN — ZIPRASIDONE MESYLATE 20 MG: 20 INJECTION, POWDER, LYOPHILIZED, FOR SOLUTION INTRAMUSCULAR at 22:42

## 2024-03-26 RX ADMIN — DOCUSATE SODIUM AND SENNOSIDES 1 TABLET: 8.6; 5 TABLET, FILM COATED ORAL at 21:04

## 2024-03-26 RX ADMIN — SODIUM HYPOCHLORITE: 1.25 SOLUTION TOPICAL at 21:05

## 2024-03-26 RX ADMIN — CLINDAMYCIN PHOSPHATE 600 MG: 600 INJECTION, SOLUTION INTRAVENOUS at 17:27

## 2024-03-26 RX ADMIN — INSULIN HUMAN 10 UNITS: 100 INJECTION, SOLUTION PARENTERAL at 17:37

## 2024-03-26 RX ADMIN — MEROPENEM 1000 MG: 1 INJECTION, POWDER, FOR SOLUTION INTRAVENOUS at 20:12

## 2024-03-26 RX ADMIN — INSULIN DETEMIR 30 UNITS: 100 INJECTION, SOLUTION SUBCUTANEOUS at 22:13

## 2024-03-26 RX ADMIN — DIPHENHYDRAMINE HYDROCHLORIDE 50 MG: 50 INJECTION, SOLUTION INTRAMUSCULAR; INTRAVENOUS at 21:03

## 2024-03-26 RX ADMIN — APIXABAN 5 MG: 5 TABLET, FILM COATED ORAL at 20:57

## 2024-03-26 RX ADMIN — HALOPERIDOL LACTATE 5 MG: 5 INJECTION, SOLUTION INTRAMUSCULAR at 21:03

## 2024-03-26 RX ADMIN — PANTOPRAZOLE SODIUM 40 MG: 40 TABLET, DELAYED RELEASE ORAL at 21:04

## 2024-03-26 RX ADMIN — LORAZEPAM 2 MG: 2 INJECTION, SOLUTION INTRAMUSCULAR; INTRAVENOUS at 21:03

## 2024-03-26 RX ADMIN — INSULIN LISPRO 4 UNITS: 100 INJECTION, SOLUTION INTRAVENOUS; SUBCUTANEOUS at 22:13

## 2024-03-26 RX ADMIN — DIPHENHYDRAMINE HYDROCHLORIDE 25 MG: 50 INJECTION, SOLUTION INTRAMUSCULAR; INTRAVENOUS at 20:12

## 2024-03-26 NOTE — ED PROVIDER NOTES
Subjective   History of Present Illness  68-year-old male presents to the emergency department with complaint of altered mental status.  He has a history of hypertension, hyperlipidemia, poorly controlled diabetes, coronary disease.  Patient was last known well yesterday evening.  Wife states she saw him at that time, went to bed without any bedtime and he was in his usual state of health.  Wife went to work earlier this morning, states she did take about her  but could not tell if he was confused or just sleepy.  This afternoon son states the patient appeared extremely confused, having difficulty caring on normal conversations.  There was no obvious focal numbness or weakness, no facial asymmetry or slurred speech but the patient had slowed speech and was a little disoriented.  EMS was called, on scene patient's glucose was in the 500s.    Upon arrival to the emergency department, was found that patient had a wound to his left foot.  Wife states he had significant worsening to the wounds over the past several days.  No fever, no nausea or vomiting.    History provided by:  Patient      Review of Systems   All other systems reviewed and are negative.      Past Medical History:   Diagnosis Date    Arthritis     Autonomic disease     CAD (coronary artery disease) 02/06/2017    Cervical radiculopathy 09/16/2021    Chronic constipation with acute exaccerbation 05/10/2021    Coronary artery disease     Degeneration of cervical intervertebral disc 08/11/2021    Diabetes mellitus     Diabetic foot ulcer 08/31/2020    Diabetic polyneuropathy associated with type 2 diabetes mellitus 01/18/2021    Elevated cholesterol     Gastroesophageal reflux disease 05/13/2019    Headache     HTN (hypertension), benign 05/03/2017    Hyperlipidemia     Hypertension     Mixed hyperlipidemia 02/07/2017    Multiple lung nodules 01/26/2020    5mm, 9 mm RLL identified 1/2020, not present 10/2019.    Myocardial infarction      "Osteomyelitis 01/22/2020    Osteomyelitis of fifth toe of right foot 10/07/2019    Pancreatitis     Persistent insomnia 01/20/2020    Renal disorder     Sleep apnea 02/06/2017    Sleep apnea with use of continuous positive airway pressure (CPAP)     NON-COMPLIANT    Slow transit constipation 01/16/2019    Spinal stenosis in cervical region 09/16/2021    Vitamin D deficiency 03/02/2021       Allergies   Allergen Reactions    Cefepime Hives and Anaphylaxis    Bactrim [Sulfamethoxazole-Trimethoprim] Other (See Comments)     \"RENAL FAILURE\"    Vancomycin Itching    Zolpidem Unknown - High Severity     \"makes him crazy\"    Zolpidem Tartrate Unknown - Low Severity and Provider Review Needed    Metronidazole Rash       Past Surgical History:   Procedure Laterality Date    ABDOMINAL SURGERY      AMPUTATION FOOT / TOE Left 10/2021    5th digit     ANTERIOR CERVICAL DISCECTOMY W/ FUSION N/A 8/5/2022    Procedure: CERVICAL DISCECTOMY ANTERIOR WITH FUSION C5-6 with possible plating of C5-7 with neuromonitoring and 1 c-arm;  Surgeon: Karel Soliz MD;  Location:  [facility] OR;  Service: Neurosurgery;  Laterality: N/A;    APPENDECTOMY      BACK SURGERY      CARDIAC CATHETERIZATION Left 02/08/2021    Procedure: Left Heart Cath w poss intervention left anatomical snuff box acess;  Surgeon: Omkar Charles DO;  Location:  [facility] CATH INVASIVE LOCATION;  Service: Cardiology;  Laterality: Left;    CARDIAC SURGERY      CATARACT EXTRACTION      CERVICAL SPINE SURGERY      COLONOSCOPY N/A 01/31/2017    Normal exam repeat in 5 years    COLONOSCOPY N/A 02/11/2019    Mild acute inflammation    COLONOSCOPY W/ POLYPECTOMY  03/04/2014    Hyperplastic polyp    CORONARY ARTERY BYPASS GRAFT  10/2015    ENDOSCOPY  04/13/2011    Gastritis with hemorrhage    ENDOSCOPY N/A 05/05/2017    Normal exam    ENDOSCOPY N/A 02/11/2019    Gastritis    ENDOSCOPY N/A 09/01/2020    Non-erosive gastritis with hemorrhage    ENDOSCOPY N/A 02/10/2021    " Esophagitis    FOOT SURGERY Left     INCISION AND DRAINAGE OF WOUND Left 2007    spider bite       Family History   Problem Relation Age of Onset    Colon cancer Father     Heart disease Father     Colon cancer Sister     Colon polyps Sister     Alzheimer's disease Mother     Coronary artery disease Sister     Coronary artery disease Sister        Social History     Socioeconomic History    Marital status:    Tobacco Use    Smoking status: Former     Current packs/day: 0.00     Types: Cigarettes     Quit date:      Years since quittin.2    Smokeless tobacco: Never    Tobacco comments:     smoked in highschool   Vaping Use    Vaping status: Never Used   Substance and Sexual Activity    Alcohol use: No    Drug use: No    Sexual activity: Defer           Objective   Physical Exam  Vitals and nursing note reviewed.   Constitutional:       Appearance: He is ill-appearing.      Comments: Patient is awake but drowsy, confused, ill-appearing but not in distress   HENT:      Head: Normocephalic and atraumatic.      Nose: Nose normal. No congestion or rhinorrhea.      Mouth/Throat:      Mouth: Mucous membranes are moist.      Pharynx: No oropharyngeal exudate or posterior oropharyngeal erythema.   Eyes:      Extraocular Movements: Extraocular movements intact.      Conjunctiva/sclera: Conjunctivae normal.      Pupils: Pupils are equal, round, and reactive to light.   Cardiovascular:      Rate and Rhythm: Regular rhythm. Tachycardia present.      Heart sounds: Normal heart sounds. No murmur heard.  Pulmonary:      Effort: Pulmonary effort is normal.      Breath sounds: Normal breath sounds. No wheezing, rhonchi or rales.   Abdominal:      General: Abdomen is flat. Bowel sounds are normal.      Palpations: Abdomen is soft.   Genitourinary:     Comments: Scrotum and perineum normal appearing without evidence of cellulitis, soft tissue infection  Musculoskeletal:      Right lower leg: No edema.      Left lower  leg: Edema present.      Comments: Large soft tissue wound plantar aspect of left foot about the calcaneus, base is beefy red.  No surrounding erythema, no abnormal discharge or drainage.  Wound to the plantar aspect of forefoot.  Wound to dorsal medial aspect left foot with surrounding erythema.  Some nonviable appearing tissue at the base of the wound, no abnormal discharge or drainage.  Wound is foul-smelling   Skin:     General: Skin is warm and dry.   Neurological:      Mental Status: He is disoriented.         Procedures       Lab Results (last 24 hours)       Procedure Component Value Units Date/Time    POC Glucose Once [[id number]]  (Abnormal) Collected: 03/26/24 1315    Specimen: Blood Updated: 03/26/24 1326     Glucose 400 mg/dL      Comment: : [id number] Karel Schwartz ID: [id number]       CBC & Differential [[id number]]  (Abnormal) Collected: 03/26/24 1326    Specimen: Blood Updated: 03/26/24 1336    Narrative:      The following orders were created for panel order CBC & Differential.  Procedure                               Abnormality         Status                     ---------                               -----------         ------                     CBC Auto Differential[[id number]]        Abnormal            Final result                 Please view results for these tests on the individual orders.    Comprehensive Metabolic Panel [[id number]]  (Abnormal) Collected: 03/26/24 1326    Specimen: Blood Updated: 03/26/24 1418     Glucose 438 mg/dL      BUN 29 mg/dL      Creatinine 1.93 mg/dL      Sodium 140 mmol/L      Potassium 3.4 mmol/L      Chloride 103 mmol/L      CO2 23.0 mmol/L      Calcium 9.5 mg/dL      Total Protein 6.5 g/dL      Albumin 3.7 g/dL      ALT (SGPT) 8 U/L      AST (SGOT) 13 U/L      Alkaline Phosphatase 91 U/L      Total Bilirubin 0.7 mg/dL      Globulin 2.8 gm/dL      A/G Ratio 1.3 g/dL      BUN/Creatinine Ratio 15.0     Anion Gap 14.0 mmol/L      eGFR 37.5 mL/min/1.73      "Narrative:      GFR Normal >60  Chronic Kidney Disease <60  Kidney Failure <15      Lactic Acid, Plasma [[id number]]  (Abnormal) Collected: 03/26/24 1326    Specimen: Blood Updated: 03/26/24 1417     Lactate 3.6 mmol/L     CBC Auto Differential [[id number]]  (Abnormal) Collected: 03/26/24 1326    Specimen: Blood Updated: 03/26/24 1336     WBC 14.69 10*3/mm3      RBC 3.90 10*6/mm3      Hemoglobin 11.3 g/dL      Hematocrit 33.8 %      MCV 86.7 fL      MCH 29.0 pg      MCHC 33.4 g/dL      RDW 14.4 %      RDW-SD 44.8 fl      MPV 12.4 fL      Platelets 171 10*3/mm3      Neutrophil % 89.1 %      Lymphocyte % 1.7 %      Monocyte % 8.1 %      Eosinophil % 0.1 %      Basophil % 0.2 %      Immature Grans % 0.8 %      Neutrophils, Absolute 13.09 10*3/mm3      Lymphocytes, Absolute 0.25 10*3/mm3      Monocytes, Absolute 1.19 10*3/mm3      Eosinophils, Absolute 0.01 10*3/mm3      Basophils, Absolute 0.03 10*3/mm3      Immature Grans, Absolute 0.12 10*3/mm3      nRBC 0.0 /100 WBC     Procalcitonin [[id number]]  (Abnormal) Collected: 03/26/24 1326    Specimen: Blood Updated: 03/26/24 1402     Procalcitonin 6.56 ng/mL     Narrative:      As a Marker for Sepsis (Non-Neonates):    1. <0.5 ng/mL represents a low risk of severe sepsis and/or septic shock.  2. >2 ng/mL represents a high risk of severe sepsis and/or septic shock.    As a Marker for Lower Respiratory Tract Infections that require antibiotic therapy:    PCT on Admission    Antibiotic Therapy       6-12 Hrs later    >0.5                Strongly Recommended  >0.25 - <0.5        Recommended   0.1 - 0.25          Discouraged              Remeasure/reassess PCT  <0.1                Strongly Discouraged     Remeasure/reassess PCT    As 28 day mortality risk marker: \"Change in Procalcitonin Result\" (>80% or <=80%) if Day 0 (or Day 1) and Day 4 values are available. Refer to http://www.brahms-pct-calculator.com    Change in PCT <=80%  A decrease of PCT levels below or equal to 80% " defines a positive change in PCT test result representing a higher risk for 28-day all-cause mortality of patients diagnosed with severe sepsis for septic shock.    Change in PCT >80%  A decrease of PCT levels of more than 80% defines a negative change in PCT result representing a lower risk for 28-day all-cause mortality of patients diagnosed with severe sepsis or septic shock.       Magnesium [[id number]]  (Normal) Collected: 03/26/24 1326    Specimen: Blood Updated: 03/26/24 1418     Magnesium 1.7 mg/dL     Protime-INR [[id number]]  (Normal) Collected: 03/26/24 1326    Specimen: Blood Updated: 03/26/24 1345     Protime 14.3 Seconds      INR 1.07    Sedimentation Rate [[id number]]  (Abnormal) Collected: 03/26/24 1326    Specimen: Blood Updated: 03/26/24 1713     Sed Rate 39 mm/hr     Blood Culture - Blood, Arm, Right [[id number]] Collected: 03/26/24 1346    Specimen: Blood from Arm, Right Updated: 03/26/24 1408    Blood Culture - Blood, Arm, Left [[id number]] Collected: 03/26/24 1346    Specimen: Blood from Arm, Left Updated: 03/26/24 1409    Urinalysis With Culture If Indicated - Urine, Catheter [[id number]]  (Abnormal) Collected: 03/26/24 1434    Specimen: Urine, Catheter Updated: 03/26/24 1500     Color, UA Yellow     Appearance, UA Clear     pH, UA 5.5     Specific Gravity, UA 1.020     Glucose,  mg/dL (2+)     Ketones, UA Trace     Bilirubin, UA Negative     Blood, UA Small (1+)     Protein, UA >=300 mg/dL (3+)     Leuk Esterase, UA Negative     Nitrite, UA Negative     Urobilinogen, UA 0.2 E.U./dL    Narrative:      In absence of clinical symptoms, the presence of pyuria, bacteria, and/or nitrites on the urinalysis result does not correlate with infection.    Urinalysis, Microscopic Only - Urine, Catheter [[id number]]  (Abnormal) Collected: 03/26/24 1434    Specimen: Urine, Catheter Updated: 03/26/24 1500     RBC, UA 0-2 /HPF      WBC, UA 0-2 /HPF      Comment: Urine culture not indicated.        Bacteria, UA  Trace /HPF      Squamous Epithelial Cells, UA 3-6 /HPF      Hyaline Casts, UA 0-2 /LPF      Methodology Manual Light Microscopy    STAT Lactic Acid, Reflex [[id number]]  (Abnormal) Collected: 03/26/24 1624    Specimen: Blood from Arm, Left Updated: 03/26/24 1658     Lactate 3.4 mmol/L          XR Foot 3+ View Left    Result Date: 3/26/2024  EXAMINATION: XR FOOT 3+ VW LEFT-  3/26/2024 4:19 PM  HISTORY: foot infection  3 view LEFT foot exam.  Previous amputation of the mid/distal fifth metatarsal.  Interval resection or resorption of the fourth metatarsal head.  No bone destruction or soft tissue air is seen.  High-grade degenerative disease at the first metatarsal phalangeal joint.  Unchanged appearance of prominent dorsal midfoot spurring and prominent inferior calcaneal spurring.  Mild soft tissue edema over the dorsum of the foot is less prominent as compared with the previous exam.      1. No bone destruction or soft tissue air is seen. 2. Prominent degenerative spurring.    This report was signed and finalized on 3/26/2024 5:25 PM by Dr. Gomez Mcgill MD.      XR Chest 1 View    Result Date: 3/26/2024  EXAM: XR CHEST 1 VW-  DATE: 3/26/2024 1:15 PM  HISTORY: ams   COMPARISON: 8/28/2023.  TECHNIQUE:  Frontal view(s) of the chest submitted.  FINDINGS:  Patient is status post median sternotomy and CABG. There is enlargement of the cardiac silhouette. There are low lung volumes with vascular crowding versus mild volume overload. No effusion or pneumothorax is seen.        1. Postoperative chest and low lung volumes with vascular crowding versus mild volume overload.  This report was signed and finalized on 3/26/2024 2:34 PM by Eran Christina.      CT Head Without Contrast    Result Date: 3/26/2024  EXAM: CT HEAD WO CONTRAST-  DATE: 3/26/2024 1:03 PM  HISTORY: ams   COMPARISON: 10/10/2023.  DOSE LENGTH PRODUCT: 876.8 mGy.cm  Automated exposure control was also utilized to decrease patient radiation dose.  TECHNIQUE:  Unenhanced CT images obtained from vertex to skull base with multiplanar reformats.  FINDINGS: There is no acute intracranial hemorrhage, midline shift, mass effect, or hydrocephalus. There is no CT evidence for acute infarct.  There are chronic changes with volume loss and chronic small vessel ischemic change of the periventricular white matter.  SOFT TISSUES: The scalp soft tissues are unremarkable.   SINUS: There is partially imaged mucosal thickening at the right maxillary sinus.  ORBITS: The visualized orbits and globes are unremarkable. There is bilateral lens extraction.       1. Chronic changes and no acute intracranial findings.  This report was signed and finalized on 3/26/2024 2:10 PM by Eran Christina.        ED Course  ED Course as of 03/26/24 1728 Tue Mar 26, 2024   1724 68-year-old male with history of poorly controlled diabetes, hypertension, hyperlipidemia, coronary disease who presents to the ED with generalized weakness and altered mental status.  Likely secondary to infection of the left foot.  Patient was afebrile on arrival to the ER but was slightly tachycardic, white count 14,000.  Lactic acid 3.6.  Repeat 3.4.  Procalcitonin 15.  Chest x-ray negative for pneumonia.  Urine negative for UTI.  Abdomen soft, nontender nondistended.  No vomiting or diarrhea.  Doubt GI etiology for patient's sepsis.    Patient does have several wounds to his left foot, possible etiology for the patient's infection.  Will add x-ray to check for osteomyelitis.  He has an allergy to cephalosporins, allergy to vancomycin.  Will give 600 mg clindamycin IV, plan for admission to the hospitalist service.  Patient is receiving fluids but does not need 30 mill per KG fluid bolus as lactate less than 4, blood pressure normal and no evidence of endorgan damage. [AW]      ED Course User Index  [AW] Steev De Jesus MD                          Total (NIH Stroke Scale): 1                  Medical Decision  Making  Amount and/or Complexity of Data Reviewed  Labs: ordered.  Radiology: ordered.  ECG/medicine tests: ordered.    Risk  OTC drugs.  Prescription drug management.        Final diagnoses:   Diabetic foot infection   Poorly controlled diabetes mellitus       ED Disposition  ED Disposition       ED Disposition   Decision to Admit    Condition   --    Comment   Level of Care: Remote Telemetry [26]   Diagnosis: Diabetic foot infection [683718]   Admitting Physician: ADITYA GUNN [[id number]]   Attending Physician: ADITYA GUNN [[id number]]   Isolate for COVID?: No [0]   Certification: I Certify That Inpatient Hospital Services Are Medically Necessary For Greater Than 2 Midnights                 No follow-up provider specified.       Medication List        New Prescriptions      famotidine 20 MG tablet  Commonly known as: PEPCID  Take 1 tablet twice a day by oral route.     fluticasone 50 MCG/ACT nasal spray  Commonly known as: FLONASE  1 spray into the nostril(s) as directed by provider Daily.     methylcellulose oral powder  Commonly known as: Citrucel  Mix 5g as directed and drink twice daily for 90 days.            Changed      * bumetanide 2 MG tablet  Commonly known as: BUMEX  Take 1 tablet by mouth Daily. Take 2 tablets in morning;1 tablet at night  What changed: Another medication with the same name was added. Make sure you understand how and when to take each.     * bumetanide 1 MG tablet  Commonly known as: BUMEX  Take 1 tablet every day by oral route for 30 days.  What changed: You were already taking a medication with the same name, and this prescription was added. Make sure you understand how and when to take each.     * calcitriol 0.5 MCG capsule  Commonly known as: ROCALTROL  Take 1 capsule by mouth Daily.  What changed: Another medication with the same name was added. Make sure you understand how and when to take each.     * calcitriol 0.5 MCG capsule  Commonly known as: ROCALTROL  Take 1 capsule  every day by oral route for 90 days.  What changed: You were already taking a medication with the same name, and this prescription was added. Make sure you understand how and when to take each.     * carvedilol 6.25 MG tablet  Commonly known as: COREG  Take 1 tablet by mouth 2 (Two) Times a Day.  What changed: Another medication with the same name was added. Make sure you understand how and when to take each.     * carvedilol 3.125 MG tablet  Commonly known as: COREG  Take 1 tablet twice a day by oral route.  What changed: You were already taking a medication with the same name, and this prescription was added. Make sure you understand how and when to take each.     * Diclofenac Sodium 1 % gel gel  Commonly known as: VOLTAREN  Apply 2 g topically to the appropriate area as directed 4 (Four) Times a Day As Needed.  What changed: Another medication with the same name was added. Make sure you understand how and when to take each.     * Diclofenac Sodium 1 % gel gel  Commonly known as: VOLTAREN  Apply 2 g topically to the appropriate area as directed 4 (Four) Times a Day.  What changed: You were already taking a medication with the same name, and this prescription was added. Make sure you understand how and when to take each.     * DULoxetine 60 MG capsule  Commonly known as: CYMBALTA  Take 1 capsule by mouth Daily.  What changed: Another medication with the same name was added. Make sure you understand how and when to take each.     * DULoxetine 60 MG capsule  Commonly known as: CYMBALTA  Take 1 capsule by mouth Daily.  What changed: Another medication with the same name was added. Make sure you understand how and when to take each.     * DULoxetine 60 MG capsule  Commonly known as: CYMBALTA  Take 1 capsule by mouth Daily.  What changed: You were already taking a medication with the same name, and this prescription was added. Make sure you understand how and when to take each.     * magnesium hydroxide 400 MG/5ML  suspension  Commonly known as: Milk of Magnesia  Take 30 mL every day by oral route for 30 days.  What changed: Another medication with the same name was added. Make sure you understand how and when to take each.     * magnesium hydroxide 400 MG/5ML suspension  Commonly known as: Milk of Magnesia  Take 30mL by mouth once daily for 30 days.  What changed: You were already taking a medication with the same name, and this prescription was added. Make sure you understand how and when to take each.           * This list has 13 medication(s) that are the same as other medications prescribed for you. Read the directions carefully, and ask your doctor or other care provider to review them with you.                   Where to Get Your Medications        These medications were sent to [facility] - [place] [id number]      Hours: Monday to Friday 7 AM to 5 PM (Closed 12:30 PM to 1 PM) Phone: [phone]   bumetanide 1 MG tablet  calcitriol 0.5 MCG capsule  carvedilol 3.125 MG tablet  Diclofenac Sodium 1 % gel gel  DULoxetine 60 MG capsule  famotidine 20 MG tablet  fluticasone 50 MCG/ACT nasal spray  magnesium hydroxide 400 MG/5ML suspension  methylcellulose oral powder            Steve De Jesus MD  03/26/24 1012

## 2024-03-26 NOTE — H&P
[facility]  HISTORY AND PHYSICAL    Date of Admission: 3/26/2024  Primary Care Physician: Del Shetty MD    Subjective   Primary Historian: Patient's wife Joan    Chief Complaint: Altered mental status    History of Present Illness  Erick Luong is a 68-year-old male with a past medical history of coronary artery disease, diastolic dysfunction followed by Dr. Garay, vascular disease, GERD, diabetes, chronic anemia, chronic nonhealing wound to the left foot followed by [facility] wound care.  Patient had an extended hospitalization 8/2023 due to syncope, subsequent admission to [facility] 9/18 - 10/11, 2023.  During that admission he did undergo debridement of Proteus wound infection.    Patient seen by PCP on 3/11/2024, started on prazosin 1 mg daily, duloxetine 60 mg daily magnesium hydroxide 400 mg daily per office note.  Wife is unaware of exactly what patient is taking.  Will need pharmacy to obtain correct med list.    Wife states they were seen by Dylan Pearson, podiatry yesterday who performed debridement on the plantar wound treating with Santyl, is also a area on dorsal aspect of the foot.  Foot is erythemic, warm to touch.  Doctor told patient and wife he was doing well.  There was no signs of infection yesterday.  Today patient was brought to emergency department via EMS for altered mental status with disorientation, glucose was noted to be over 500 in the ambulance.  Initial glucose here 400 followed with 438 treated with regular insulin.  I have taken care of this patient many times.  He awakens for questions.  He is somewhat somnolent.  Remainder workup reveals creatinine 1.9 at baseline, CRP 4.3, lactic acid 3.2 and procalcitonin 6.56.  He does have a white count of 14.6, no bandemia.  Initially afebrile however now 100.7, patient meets criteria for sepsis with tachycardia and tachypnea, elevated lactic acid and source of infection.   There is no organ failure.  Patient is admitted for simple sepsis, condition stable.      Review of Systems   Otherwise complete ROS reviewed and negative except as mentioned in the HPI.    Past Medical History:   Past Medical History:   Diagnosis Date    Arthritis     Autonomic disease     CAD (coronary artery disease) 02/06/2017    Cervical radiculopathy 09/16/2021    Chronic constipation with acute exaccerbation 05/10/2021    Coronary artery disease     Degeneration of cervical intervertebral disc 08/11/2021    Diabetes mellitus     Diabetic foot ulcer 08/31/2020    Diabetic polyneuropathy associated with type 2 diabetes mellitus 01/18/2021    Elevated cholesterol     Gastroesophageal reflux disease 05/13/2019    Headache     HTN (hypertension), benign 05/03/2017    Hyperlipidemia     Hypertension     Mixed hyperlipidemia 02/07/2017    Multiple lung nodules 01/26/2020    5mm, 9 mm RLL identified 1/2020, not present 10/2019.    Myocardial infarction     Osteomyelitis 01/22/2020    Osteomyelitis of fifth toe of right foot 10/07/2019    Pancreatitis     Persistent insomnia 01/20/2020    Renal disorder     Sleep apnea 02/06/2017    Sleep apnea with use of continuous positive airway pressure (CPAP)     NON-COMPLIANT    Slow transit constipation 01/16/2019    Spinal stenosis in cervical region 09/16/2021    Vitamin D deficiency 03/02/2021     Past Surgical History:  Past Surgical History:   Procedure Laterality Date    ABDOMINAL SURGERY      AMPUTATION FOOT / TOE Left 10/2021    5th digit     ANTERIOR CERVICAL DISCECTOMY W/ FUSION N/A 8/5/2022    Procedure: CERVICAL DISCECTOMY ANTERIOR WITH FUSION C5-6 with possible plating of C5-7 with neuromonitoring and 1 c-arm;  Surgeon: Karel Soliz MD;  Location: [facility] OR;  Service: Neurosurgery;  Laterality: N/A;    APPENDECTOMY      BACK SURGERY      CARDIAC CATHETERIZATION Left 02/08/2021    Procedure: Left Heart Cath w poss intervention left anatomical snuff box acess;   "Surgeon: Omkar Charles DO;  Location: [facility] CATH INVASIVE LOCATION;  Service: Cardiology;  Laterality: Left;    CARDIAC SURGERY      CATARACT EXTRACTION      CERVICAL SPINE SURGERY      COLONOSCOPY N/A 01/31/2017    Normal exam repeat in 5 years    COLONOSCOPY N/A 02/11/2019    Mild acute inflammation    COLONOSCOPY W/ POLYPECTOMY  03/04/2014    Hyperplastic polyp    CORONARY ARTERY BYPASS GRAFT  10/2015    ENDOSCOPY  04/13/2011    Gastritis with hemorrhage    ENDOSCOPY N/A 05/05/2017    Normal exam    ENDOSCOPY N/A 02/11/2019    Gastritis    ENDOSCOPY N/A 09/01/2020    Non-erosive gastritis with hemorrhage    ENDOSCOPY N/A 02/10/2021    Esophagitis    FOOT SURGERY Left     INCISION AND DRAINAGE OF WOUND Left 09/2007    spider bite     Social History:  reports that he quit smoking about 33 years ago. His smoking use included cigarettes. He has never used smokeless tobacco. He reports that he does not drink alcohol and does not use drugs.    Family History: family history includes Alzheimer's disease in his mother; Colon cancer in his father and sister; Colon polyps in his sister; Coronary artery disease in his sister and sister; Heart disease in his father.       Allergies:  Allergies   Allergen Reactions    Cefepime Hives and Anaphylaxis    Bactrim [Sulfamethoxazole-Trimethoprim] Other (See Comments)     \"RENAL FAILURE\"    Vancomycin Itching    Zolpidem Unknown - High Severity     \"makes him crazy\"    Zolpidem Tartrate Unknown - Low Severity and Provider Review Needed    Metronidazole Rash       Medications:  Prior to Admission medications    Medication Sig Start Date End Date Taking? Authorizing Provider   amitriptyline (ELAVIL) 25 MG tablet/ Take 2 tablets by mouth Daily at bedtime. 2/16/23      amoxicillin-clavulanate (Augmentin) 500-125 MG per tablet Take 1 tablet by mouth every 12 hours with meals for 7 days. 9/5/23      ascorbic acid (VITAMIN C) 1000 MG tablet/ Take 1 tablet by mouth Daily. " 8/25/23      Aspirin 81 MG capsule/ Take 81 mg by mouth Daily.    Provider, MD Bossman   bumetanide (BUMEX) 1 MG tablet Take 1 tablet every day by oral route for 30 days. 3/26/24 4/25/24     bumetanide (BUMEX) 2 MG tablet/ Take 2 tablets in morning;1 tablet at night 8/23/23   Lexie Houston APRN   busPIRone (BUSPAR) 10 MG tablet/ Take 1 tablet by mouth 2 (Two) Times a Day As Needed. 6/21/23   Payam Keyes DO   calcitriol (ROCALTROL) 0.5 MCG capsule/ Take 1 capsule every day by oral route for 90 days. 3/26/24 6/24/24     carvedilol (COREG) 6.25 MG tablet/ Take 1 tablet by mouth 2 (Two) Times a Day. 12/22/22      Cholecalciferol (D-5000) 125 MCG (5000 UT) tablet// Take 1 tablet by mouth Daily Before Lunch. 8/25/23      cloNIDine (CATAPRES) 0.1 MG tablet Take 1 tablet by mouth Every 12 (Twelve) Hours. 11/16/23      Diclofenac Sodium (VOLTAREN) 1 % gel gel Apply 2 g topically to the appropriate area as directed 4 (Four) Times a Day As Needed. 6/1/23      Diclofenac Sodium (VOLTAREN) 1 % gel gel Apply 2 g topically to the appropriate area as directed 4 (Four) Times a Day. 3/26/24      donepezil (ARICEPT) 10 MG tablet Take 1 tablet by mouth Daily. 7/20/22      Dulaglutide (Trulicity) 4.5 MG/0.5ML solution pen-injector Inject 0.5 mL under the skin into the appropriate area as directed 1 (One) Time Per Week. 7/24/23      DULoxetine (CYMBALTA) 60 MG capsule Take 1 capsule by mouth Daily. 3/11/24      DULoxetine (CYMBALTA) 60 MG capsule Take 1 capsule by mouth Daily. 3/15/24      DULoxetine (CYMBALTA) 60 MG capsule Take 1 capsule by mouth Daily. 3/26/24      empagliflozin (JARDIANCE) 25 MG tablet tablet/ Take 1 tablet by mouth Daily. 6/22/23   Keyes, Payam S, DO   famotidine (PEPCID) 20 MG tablet Take 1 tablet twice a day by oral route. 3/26/24      fluticasone (FLONASE) 50 MCG/ACT nasal spray 1 spray into the nostril(s) as directed by provider Daily. 3/26/24      HYDROcodone-acetaminophen (NORCO) 7.5-325 MG  per tablet/ Take 1 tablet by mouth 2 (Two) Times a Day As Needed. 9/5/23      Insulin Glargine (Lantus SoloStar) 100 UNIT/ML injection pen Inject 20 Units under the skin into the appropriate area as directed Every Evening. 3/11/24      Insulin Regular Human, Conc, (HumuLIN R U-500 KwikPen) 500 UNIT/ML solution pen-injector CONCENTRATED injection Inject 120 Units under the skin into the appropriate area as directed 2 (Two) Times a Day with breakfast and dinner.  Patient taking differently: Inject 120 Units under the skin into the appropriate area as directed 3 (Three) Times a Day Before Meals. 7/10/23      Insulin Regular Human, Conc, (HumuLIN R U-500 KwikPen) 500 UNIT/ML solution pen-injector CONCENTRATED injection Inject 40 Units under the skin into the appropriate area with regular meals AND 40 Units with large meals. 3/4/24      magnesium hydroxide (Milk of Magnesia) 400 MG/5ML suspension/ Take 30mL by mouth once daily for 30 days. 3/26/24 4/25/24     methylcellulose (Citrucel) oral powder Mix 5g as directed and drink twice daily for 90 days. 3/26/24 6/24/24     midodrine (PROAMATINE) 10 MG tablet/ Take 1 tablet by mouth 3 (Three) Times a Day. 9/10/23   Brian Lomas MD   nitroglycerin (NITROSTAT) 0.4 MG SL tablet Dissolve 1 tablet by mouth every 5 minutes as needed for chest pain; MAX 3 tabs/24 hours.  If no improvement after 3 tabs go to ER 3/11/24      ondansetron ODT (ZOFRAN-ODT) 8 MG disintegrating tablet Place 1 tablet under tongue 3 times a day as needed. 8/16/23      oxyCODONE (ROXICODONE) 10 MG tablet Take 1 tablet by mouth twice a day as needed 3/15/24      pantoprazole (Protonix) 40 MG EC tablet/ Take 1 tablet by mouth 2 (Two) Times a Day. 11/8/22      polyethylene glycol (MIRALAX) 17 g packet/ Take 17 g by mouth Daily. Obtain OTC 8/23/23   Lexie Houston APRN   prazosin (MINIPRESS) 1 MG capsule Take 1 capsule by mouth every night at bedtime. 3/11/24      pregabalin (LYRICA) 100 MG capsule/ Take 1  "capsule by mouth Daily With Breakfast AND 2 capsules Every Night. 7/25/23      rosuvastatin (CRESTOR) 40 MG tablet/ Take 1 tablet by mouth Every Night. 3/18/22      sennosides-docusate (PERICOLACE) 8.6-50 MG per tablet/ Take 1 tablet by mouth every night at bedtime. 8/25/23      sodium hypochlorite (DAKIN'S 1/4 STRENGTH) 0.125 % solution topical solution 0.125% Apply to affected area twice daily as directed 3/25/24      tamsulosin (FLOMAX) 0.4 MG capsule 24 hr capsule/ Take 1 capsule by mouth Daily. 8/7/23      spironolactone (ALDACTONE) 25 MG tablet Take 1 tablet by mouth Daily. 9/28/20 12/31/20  eDl Shetty MD     I have utilized all available immediate resources to obtain, update, or review the patient's current medications (including all prescriptions, over-the-counter products, herbals, cannabis/cannabidiol products, and vitamin/mineral/dietary (nutritional) supplements).    Objective     Vital Signs: /54 (BP Location: Right arm, Patient Position: Lying)   Pulse 107   Temp (!) 102.3 °F (39.1 °C) (Oral) Comment: nurse noted  Resp 20   Ht 182.9 cm (72\")   Wt 130 kg (286 lb 9.6 oz)   SpO2 98%   BMI 38.87 kg/m²   Physical Exam  Vitals reviewed.   Constitutional:       Appearance: He is obese. He is ill-appearing.   HENT:      Head: Normocephalic and atraumatic.      Mouth/Throat:      Mouth: Mucous membranes are moist.      Pharynx: Oropharynx is clear.   Eyes:      Extraocular Movements: Extraocular movements intact.      Conjunctiva/sclera: Conjunctivae normal.   Cardiovascular:      Rate and Rhythm: Tachycardia present. Rhythm irregular.      Comments: Atrial fibrillation  Pulmonary:      Effort: Pulmonary effort is normal.      Breath sounds: Normal breath sounds.   Abdominal:      General: Abdomen is protuberant.      Palpations: Abdomen is soft.   Musculoskeletal:      Cervical back: Normal range of motion and neck supple.      Right lower leg: No edema.      Left lower leg: No edema.       "  Feet:       Comments: Generalized weakness and debility.  Amputation fifth digit on left foot   Skin:     Findings: Erythema present.   Neurological:      Comments: Obtunded   Psychiatric:         Mood and Affect: Affect is flat.         Behavior: Behavior is cooperative.        Results Reviewed:  Lab Results (last 24 hours)       Procedure Component Value Units Date/Time    Sedimentation Rate [[id number]]  (Abnormal) Collected: 03/26/24 1326    Specimen: Blood Updated: 03/26/24 1713     Sed Rate 39 mm/hr     STAT Lactic Acid, Reflex [[id number]]  (Abnormal) Collected: 03/26/24 1624    Specimen: Blood from Arm, Left Updated: 03/26/24 1658     Lactate 3.4 mmol/L     Urinalysis With Culture If Indicated - Urine, Catheter [[id number]]  (Abnormal) Collected: 03/26/24 1434    Specimen: Urine, Catheter Updated: 03/26/24 1500     Color, UA Yellow     Appearance, UA Clear     pH, UA 5.5     Specific Gravity, UA 1.020     Glucose,  mg/dL (2+)     Ketones, UA Trace     Bilirubin, UA Negative     Blood, UA Small (1+)     Protein, UA >=300 mg/dL (3+)     Leuk Esterase, UA Negative     Nitrite, UA Negative     Urobilinogen, UA 0.2 E.U./dL    Urinalysis, Microscopic Only - Urine, Catheter [[id number]]  (Abnormal) Collected: 03/26/24 1434    Specimen: Urine, Catheter Updated: 03/26/24 1500     RBC, UA 0-2 /HPF      WBC, UA 0-2 /HPF      Comment: Urine culture not indicated.        Bacteria, UA Trace /HPF      Squamous Epithelial Cells, UA 3-6 /HPF      Hyaline Casts, UA 0-2 /LPF      Methodology Manual Light Microscopy    Comprehensive Metabolic Panel [[id number]]  (Abnormal) Collected: 03/26/24 1326    Specimen: Blood Updated: 03/26/24 1418     Glucose 438 mg/dL      BUN 29 mg/dL      Creatinine 1.93 mg/dL      Sodium 140 mmol/L      Potassium 3.4 mmol/L      Chloride 103 mmol/L      CO2 23.0 mmol/L      Calcium 9.5 mg/dL      Total Protein 6.5 g/dL      Albumin 3.7 g/dL      ALT (SGPT) 8 U/L      AST (SGOT) 13 U/L       Alkaline Phosphatase 91 U/L      Total Bilirubin 0.7 mg/dL      Globulin 2.8 gm/dL      A/G Ratio 1.3 g/dL      BUN/Creatinine Ratio 15.0     Anion Gap 14.0 mmol/L      eGFR 37.5 mL/min/1.73     Magnesium [[id number]]  (Normal) Collected: 03/26/24 1326    Specimen: Blood Updated: 03/26/24 1418     Magnesium 1.7 mg/dL     Lactic Acid, Plasma [[id number]]  (Abnormal) Collected: 03/26/24 1326    Specimen: Blood Updated: 03/26/24 1417     Lactate 3.6 mmol/L     Blood Culture - Blood, Arm, Left [[id number]] Collected: 03/26/24 1346    Specimen: Blood from Arm, Left Updated: 03/26/24 1409    Blood Culture - Blood, Arm, Right [[id number]] Collected: 03/26/24 1346    Specimen: Blood from Arm, Right Updated: 03/26/24 1408    Procalcitonin [[id number]]  (Abnormal) Collected: 03/26/24 1326    Specimen: Blood Updated: 03/26/24 1402     Procalcitonin 6.56 ng/mL     Protime-INR [[id number]]  (Normal) Collected: 03/26/24 1326    Specimen: Blood Updated: 03/26/24 1345     Protime 14.3 Seconds      INR 1.07    CBC Auto Differential [[id number]]  (Abnormal) Collected: 03/26/24 1326    Specimen: Blood Updated: 03/26/24 1336     WBC 14.69 10*3/mm3      RBC 3.90 10*6/mm3      Hemoglobin 11.3 g/dL      Hematocrit 33.8 %      MCV 86.7 fL      MCH 29.0 pg      MCHC 33.4 g/dL      RDW 14.4 %      RDW-SD 44.8 fl      MPV 12.4 fL      Platelets 171 10*3/mm3      Neutrophil % 89.1 %      Lymphocyte % 1.7 %      Monocyte % 8.1 %      Eosinophil % 0.1 %      Basophil % 0.2 %      Immature Grans % 0.8 %      Neutrophils, Absolute 13.09 10*3/mm3      Lymphocytes, Absolute 0.25 10*3/mm3      Monocytes, Absolute 1.19 10*3/mm3      Eosinophils, Absolute 0.01 10*3/mm3      Basophils, Absolute 0.03 10*3/mm3      Immature Grans, Absolute 0.12 10*3/mm3      nRBC 0.0 /100 WBC     POC Glucose Once [[id number]]  (Abnormal) Collected: 03/26/24 1315    Specimen: Blood Updated: 03/26/24 1326     Glucose 400 mg/dL      Comment: : [id number] Karel Schwartz ID:  [id number]             Imaging Results (Last 24 Hours)       Procedure Component Value Units Date/Time    XR Foot 3+ View Left [[id number]] Collected: 03/26/24 1723     Updated: 03/26/24 1728    Narrative:      EXAMINATION: XR FOOT 3+ VW LEFT-     3/26/2024 4:19 PM     HISTORY: foot infection     3 view LEFT foot exam.     Previous amputation of the mid/distal fifth metatarsal.     Interval resection or resorption of the fourth metatarsal head.     No bone destruction or soft tissue air is seen.     High-grade degenerative disease at the first metatarsal phalangeal  joint.     Unchanged appearance of prominent dorsal midfoot spurring and prominent  inferior calcaneal spurring.     Mild soft tissue edema over the dorsum of the foot is less prominent as  compared with the previous exam.       Impression:      1. No bone destruction or soft tissue air is seen.  2. Prominent degenerative spurring.           This report was signed and finalized on 3/26/2024 5:25 PM by Dr. Gomez Mcgill MD.       XR Chest 1 View [[id number]] Collected: 03/26/24 1433     Updated: 03/26/24 1437    Narrative:      EXAM: XR CHEST 1 VW-      DATE: 3/26/2024 1:15 PM     HISTORY: ams       COMPARISON: 8/28/2023.     TECHNIQUE:  Frontal view(s) of the chest submitted.     FINDINGS:    Patient is status post median sternotomy and CABG. There is enlargement  of the cardiac silhouette. There are low lung volumes with vascular  crowding versus mild volume overload. No effusion or pneumothorax is  seen.          Impression:         1. Postoperative chest and low lung volumes with vascular crowding  versus mild volume overload.     This report was signed and finalized on 3/26/2024 2:34 PM by Eran Christina.       CT Head Without Contrast [[id number]] Collected: 03/26/24 1408     Updated: 03/26/24 1413    Narrative:      EXAM: CT HEAD WO CONTRAST-      DATE: 3/26/2024 1:03 PM     HISTORY: ams       COMPARISON: 10/10/2023.     DOSE LENGTH PRODUCT: 876.8  mGy.cm  Automated exposure control was also  utilized to decrease patient radiation dose.     TECHNIQUE: Unenhanced CT images obtained from vertex to skull base with  multiplanar reformats.     FINDINGS:  There is no acute intracranial hemorrhage, midline shift, mass effect,  or hydrocephalus. There is no CT evidence for acute infarct.     There are chronic changes with volume loss and chronic small vessel  ischemic change of the periventricular white matter.      SOFT TISSUES: The scalp soft tissues are unremarkable.        SINUS: There is partially imaged mucosal thickening at the right  maxillary sinus.     ORBITS: The visualized orbits and globes are unremarkable. There is  bilateral lens extraction.          Impression:      1. Chronic changes and no acute intracranial findings.      This report was signed and finalized on 3/26/2024 2:10 PM by Eran Christina.           8/22/2023 ECHO  Interpretation Summary     Technically difficult study.    Left ventricular systolic function is low normal. Left ventricular ejection fraction appears to be 51 - 55%.    Left ventricular diastolic dysfunction is noted.    Normal right ventricular cavity size and systolic function noted.    There is no significant (greater than mild) valvular dysfunction.    Compared to study from 1/21/2023, there are no significant changes.       Assessment / Plan   Assessment:   Active Hospital Problems    Diagnosis     **Sepsis     Diabetic foot infection     Chronic kidney disease (CKD), stage IV (severe)     Chronic diastolic heart failure     BPH without obstruction/lower urinary tract symptoms     Diabetic polyneuropathy associated with type 2 diabetes mellitus     Anemia due to chronic kidney disease     Essential hypertension     Type 2 diabetes mellitus with hyperglycemia, with long-term current use of insulin        Treatment Plan  1.  The patient will be admitted to Dr. Maloney's service here at [facility].   2.  Start  Linezolid and meropenem  3.  Reviewed home medications as currently documented, continued those felt to be vital, will need in-depth med rec completed by pharmacy tech ASA  5.  DVT prophylaxis with ongoing Eliquis use  6.  Monitor glucose before meals and at bedtime with regular insulin, high-dose, will start Levemir 30 units nightly  7.  Pain management, start bowel regimen  8.  Supplemental oxygen as needed-use at night due to history of sleep apnea, incentive spirometry, continuous pulse oximetry  9.  Labs in a.m.  10.  Cardiac monitoring, daily weights, neurochecks, oral care, turn every 2 hours  11.  Consult -patient may require rehab again  12.  Consult podiatry  12.  Consult wound nurse, OT and PT    Medical Decision Making  Number and Complexity of problems: 9  Differential Diagnosis: None    Conditions and Status        Condition is unchanged.     [facility] Data  External documents reviewed: No  Cardiac tracing (EKG, telemetry) interpretation: Reviewed  Radiology interpretation: Reviewed  Labs reviewed: Yes  Any tests that were considered but not ordered: No     Decision rules/scores evaluated (example [id number], Wells, etc): No     Discussed with: Patient, wife and Dr. Maloney     Care Planning  Shared decision making: Patient, wife and Dr. Maloney  Code status and discussions: Full    Disposition  Social Determinants of Health that impact treatment or disposition: May need rehab  Estimated length of stay is 2+ days.     I confirmed that the patient's advanced care plan is present, code status is documented, and a surrogate decision maker is listed in the patient's medical record.     The patient's surrogate decision maker is wife Joan.     The patient was seen and examined by me on 3/26/2024 at 5:27 PM.    Electronically signed by SUSAN Ash, 03/26/24, 19:35 CDT.

## 2024-03-26 NOTE — ED NOTES
Nursing report ED to floor  Erick Luong  68 y.o.  male    HPI:   Chief Complaint   Patient presents with    Altered Mental Status    Hyperglycemia       Admitting doctor:   Rene Maloney MD    Consulting provider(s):  Consults       No orders found from 2/26/2024 to 3/27/2024.             Admitting diagnosis:   The primary encounter diagnosis was Diabetic foot infection. A diagnosis of Poorly controlled diabetes mellitus was also pertinent to this visit.    Code status:   Current Code Status       Date Active Code Status Order ID Comments User Context       Prior            Allergies:   Cefepime, Bactrim [sulfamethoxazole-trimethoprim], Vancomycin, Zolpidem, Zolpidem tartrate, and Metronidazole    Intake and Output  No intake or output data in the 24 hours ending 03/26/24 1739    Weight:       03/26/24  1313   Weight: 130 kg (286 lb 9.6 oz)       Most recent vitals:   Vitals:    03/26/24 1325 03/26/24 1346 03/26/24 1416 03/26/24 1446   BP:  144/71 132/71 131/69   BP Location:       Patient Position:       Pulse:  99 102 103   Resp:       Temp: 97.8 °F (36.6 °C)      TempSrc: Oral      SpO2:  97%  97%   Weight:       Height:         Oxygen Therapy: .    Active LDAs/IV Access:   Lines, Drains & Airways       Active LDAs       Name Placement date Placement time Site Days    Peripheral IV 03/26/24 1725 Left Antecubital 03/26/24  1725  Antecubital  less than 1                    Labs (abnormal labs have a star):   Labs Reviewed   COMPREHENSIVE METABOLIC PANEL - Abnormal; Notable for the following components:       Result Value    Glucose 438 (*)     BUN 29 (*)     Creatinine 1.93 (*)     Potassium 3.4 (*)     eGFR 37.5 (*)     All other components within normal limits    Narrative:     GFR Normal >60  Chronic Kidney Disease <60  Kidney Failure <15     LACTIC ACID, PLASMA - Abnormal; Notable for the following components:    Lactate 3.6 (*)     All other components within normal limits   CBC WITH AUTO DIFFERENTIAL -  "Abnormal; Notable for the following components:    WBC 14.69 (*)     RBC 3.90 (*)     Hemoglobin 11.3 (*)     Hematocrit 33.8 (*)     MPV 12.4 (*)     Neutrophil % 89.1 (*)     Lymphocyte % 1.7 (*)     Eosinophil % 0.1 (*)     Immature Grans % 0.8 (*)     Neutrophils, Absolute 13.09 (*)     Lymphocytes, Absolute 0.25 (*)     Monocytes, Absolute 1.19 (*)     Immature Grans, Absolute 0.12 (*)     All other components within normal limits   URINALYSIS W/ CULTURE IF INDICATED - Abnormal; Notable for the following components:    Glucose,  mg/dL (2+) (*)     Ketones, UA Trace (*)     Blood, UA Small (1+) (*)     Protein, UA >=300 mg/dL (3+) (*)     All other components within normal limits    Narrative:     In absence of clinical symptoms, the presence of pyuria, bacteria, and/or nitrites on the urinalysis result does not correlate with infection.   PROCALCITONIN - Abnormal; Notable for the following components:    Procalcitonin 6.56 (*)     All other components within normal limits    Narrative:     As a Marker for Sepsis (Non-Neonates):    1. <0.5 ng/mL represents a low risk of severe sepsis and/or septic shock.  2. >2 ng/mL represents a high risk of severe sepsis and/or septic shock.    As a Marker for Lower Respiratory Tract Infections that require antibiotic therapy:    PCT on Admission    Antibiotic Therapy       6-12 Hrs later    >0.5                Strongly Recommended  >0.25 - <0.5        Recommended   0.1 - 0.25          Discouraged              Remeasure/reassess PCT  <0.1                Strongly Discouraged     Remeasure/reassess PCT    As 28 day mortality risk marker: \"Change in Procalcitonin Result\" (>80% or <=80%) if Day 0 (or Day 1) and Day 4 values are available. Refer to http://[email or web address]-pct-calculator.com    Change in PCT <=80%  A decrease of PCT levels below or equal to 80% defines a positive change in PCT test result representing a higher risk for 28-day all-cause mortality of patients " diagnosed with severe sepsis for septic shock.    Change in PCT >80%  A decrease of PCT levels of more than 80% defines a negative change in PCT result representing a lower risk for 28-day all-cause mortality of patients diagnosed with severe sepsis or septic shock.      LACTIC ACID, REFLEX - Abnormal; Notable for the following components:    Lactate 3.4 (*)     All other components within normal limits   URINALYSIS, MICROSCOPIC ONLY - Abnormal; Notable for the following components:    Bacteria, UA Trace (*)     Squamous Epithelial Cells, UA 3-6 (*)     All other components within normal limits   SEDIMENTATION RATE - Abnormal; Notable for the following components:    Sed Rate 39 (*)     All other components within normal limits   POCT GLUCOSE FINGERSTICK - Abnormal; Notable for the following components:    Glucose 400 (*)     All other components within normal limits   POCT GLUCOSE FINGERSTICK - Abnormal; Notable for the following components:    Glucose 241 (*)     All other components within normal limits   MAGNESIUM - Normal   PROTIME-INR - Normal   BLOOD CULTURE   BLOOD CULTURE   RESPIRATORY PANEL PCR W/ COVID-19 (SARS-COV-2), NP SWAB IN UTM/VTP, 2 HR TAT   RAINBOW DRAW    Narrative:     The following orders were created for panel order [place] Draw.  Procedure                               Abnormality         Status                     ---------                               -----------         ------                     Green Top (Gel)[[id number]]                                  Final result               Lavender Top[[id number]]                                     Final result               Red Top[[id number]]                                          Final result               Light Blue Top[[id number]]                                   Final result                 Please view results for these tests on the individual orders.   LACTIC ACID, REFLEX   C-REACTIVE PROTEIN   CBC AND DIFFERENTIAL    Narrative:     The  following orders were created for panel order CBC & Differential.  Procedure                               Abnormality         Status                     ---------                               -----------         ------                     CBC Auto Differential[[id number]]        Abnormal            Final result                 Please view results for these tests on the individual orders.   GREEN TOP   LAVENDER TOP   RED TOP   LIGHT BLUE TOP       Meds given in ED:   Medications   sodium chloride 0.9 % flush 10 mL (has no administration in time range)   clindamycin (CLEOCIN) 600 mg in dextrose 5% 50 mL IVPB (premix) (600 mg Intravenous New Bag 3/26/24 1727)   lactated ringers bolus 1,000 mL (has no administration in time range)   insulin regular (humuLIN R,novoLIN R) injection 10 Units (10 Units Subcutaneous Given 3/26/24 1737)           NIH Stroke Scale:  Interval: baseline    Isolation/Infection(s):  No active isolations   MRSA, COVID (History), COVID (rule out)     COVID Testing  Collected .  Resulted .    Nursing report ED to floor:  Mental status: .  Ambulatory status: .  Precautions: .    ED nurse phone extentsion- ..

## 2024-03-27 ENCOUNTER — APPOINTMENT (OUTPATIENT)
Dept: MRI IMAGING | Facility: HOSPITAL | Age: 69
DRG: 871 | End: 2024-03-27
Payer: COMMERCIAL

## 2024-03-27 LAB
ANION GAP SERPL CALCULATED.3IONS-SCNC: 14 MMOL/L (ref 5–15)
BACTERIA BLD CULT: ABNORMAL
BASOPHILS # BLD AUTO: 0.03 10*3/MM3 (ref 0–0.2)
BASOPHILS NFR BLD AUTO: 0.2 % (ref 0–1.5)
BOTTLE TYPE: ABNORMAL
BUN SERPL-MCNC: 29 MG/DL (ref 8–23)
BUN/CREAT SERPL: 14 (ref 7–25)
CALCIUM SPEC-SCNC: 8.9 MG/DL (ref 8.6–10.5)
CHLORIDE SERPL-SCNC: 103 MMOL/L (ref 98–107)
CHOLEST SERPL-MCNC: 143 MG/DL (ref 0–200)
CO2 SERPL-SCNC: 25 MMOL/L (ref 22–29)
CREAT SERPL-MCNC: 2.07 MG/DL (ref 0.76–1.27)
CRP SERPL-MCNC: 16.33 MG/DL (ref 0–0.5)
D-LACTATE SERPL-SCNC: 1.5 MMOL/L (ref 0.5–2)
DEPRECATED RDW RBC AUTO: 46.3 FL (ref 37–54)
EGFRCR SERPLBLD CKD-EPI 2021: 34.5 ML/MIN/1.73
EOSINOPHIL # BLD AUTO: 0 10*3/MM3 (ref 0–0.4)
EOSINOPHIL NFR BLD AUTO: 0 % (ref 0.3–6.2)
ERYTHROCYTE [DISTWIDTH] IN BLOOD BY AUTOMATED COUNT: 14.5 % (ref 12.3–15.4)
ERYTHROCYTE [SEDIMENTATION RATE] IN BLOOD: 35 MM/HR (ref 0–20)
GLUCOSE BLDC GLUCOMTR-MCNC: 148 MG/DL (ref 70–130)
GLUCOSE BLDC GLUCOMTR-MCNC: 159 MG/DL (ref 70–130)
GLUCOSE BLDC GLUCOMTR-MCNC: 162 MG/DL (ref 70–130)
GLUCOSE BLDC GLUCOMTR-MCNC: 178 MG/DL (ref 70–130)
GLUCOSE BLDC GLUCOMTR-MCNC: 62 MG/DL (ref 70–130)
GLUCOSE BLDC GLUCOMTR-MCNC: 79 MG/DL (ref 70–130)
GLUCOSE SERPL-MCNC: 169 MG/DL (ref 65–99)
HBA1C MFR BLD: 10.8 % (ref 4.8–5.6)
HCT VFR BLD AUTO: 35.9 % (ref 37.5–51)
HDLC SERPL-MCNC: 48 MG/DL (ref 40–60)
HGB BLD-MCNC: 11.8 G/DL (ref 13–17.7)
IMM GRANULOCYTES # BLD AUTO: 0.08 10*3/MM3 (ref 0–0.05)
IMM GRANULOCYTES NFR BLD AUTO: 0.6 % (ref 0–0.5)
LDLC SERPL CALC-MCNC: 76 MG/DL (ref 0–100)
LDLC/HDLC SERPL: 1.54 {RATIO}
LYMPHOCYTES # BLD AUTO: 0.66 10*3/MM3 (ref 0.7–3.1)
LYMPHOCYTES NFR BLD AUTO: 5.2 % (ref 19.6–45.3)
MAGNESIUM SERPL-MCNC: 1.7 MG/DL (ref 1.6–2.4)
MCH RBC QN AUTO: 28.6 PG (ref 26.6–33)
MCHC RBC AUTO-ENTMCNC: 32.9 G/DL (ref 31.5–35.7)
MCV RBC AUTO: 87.1 FL (ref 79–97)
MONOCYTES # BLD AUTO: 0.95 10*3/MM3 (ref 0.1–0.9)
MONOCYTES NFR BLD AUTO: 7.5 % (ref 5–12)
NEUTROPHILS NFR BLD AUTO: 11.03 10*3/MM3 (ref 1.7–7)
NEUTROPHILS NFR BLD AUTO: 86.5 % (ref 42.7–76)
NRBC BLD AUTO-RTO: 0 /100 WBC (ref 0–0.2)
PHOSPHATE SERPL-MCNC: 2 MG/DL (ref 2.5–4.5)
PLATELET # BLD AUTO: 163 10*3/MM3 (ref 140–450)
PMV BLD AUTO: 11.6 FL (ref 6–12)
POTASSIUM SERPL-SCNC: 3.5 MMOL/L (ref 3.5–5.2)
QT INTERVAL: 344 MS
QT INTERVAL: 348 MS
QTC INTERVAL: 428 MS
QTC INTERVAL: 448 MS
RBC # BLD AUTO: 4.12 10*6/MM3 (ref 4.14–5.8)
SODIUM SERPL-SCNC: 142 MMOL/L (ref 136–145)
TRIGL SERPL-MCNC: 106 MG/DL (ref 0–150)
TSH SERPL DL<=0.05 MIU/L-ACNC: 0.57 UIU/ML (ref 0.27–4.2)
VLDLC SERPL-MCNC: 19 MG/DL (ref 5–40)
WBC NRBC COR # BLD AUTO: 12.75 10*3/MM3 (ref 3.4–10.8)

## 2024-03-27 PROCEDURE — 25010000002 ZIPRASIDONE MESYLATE PER 10 MG: Performed by: STUDENT IN AN ORGANIZED HEALTH CARE EDUCATION/TRAINING PROGRAM

## 2024-03-27 PROCEDURE — 25010000002 LINEZOLID 600 MG/300ML SOLUTION: Performed by: NURSE PRACTITIONER

## 2024-03-27 PROCEDURE — 83735 ASSAY OF MAGNESIUM: CPT | Performed by: NURSE PRACTITIONER

## 2024-03-27 PROCEDURE — 85652 RBC SED RATE AUTOMATED: CPT | Performed by: NURSE PRACTITIONER

## 2024-03-27 PROCEDURE — 63710000001 INSULIN DETEMIR PER 5 UNITS: Performed by: NURSE PRACTITIONER

## 2024-03-27 PROCEDURE — 83605 ASSAY OF LACTIC ACID: CPT | Performed by: EMERGENCY MEDICINE

## 2024-03-27 PROCEDURE — 87102 FUNGUS ISOLATION CULTURE: CPT | Performed by: INTERNAL MEDICINE

## 2024-03-27 PROCEDURE — 84443 ASSAY THYROID STIM HORMONE: CPT | Performed by: NURSE PRACTITIONER

## 2024-03-27 PROCEDURE — 25010000002 MEROPENEM PER 100 MG: Performed by: NURSE PRACTITIONER

## 2024-03-27 PROCEDURE — 85025 COMPLETE CBC W/AUTO DIFF WBC: CPT | Performed by: NURSE PRACTITIONER

## 2024-03-27 PROCEDURE — 86140 C-REACTIVE PROTEIN: CPT | Performed by: NURSE PRACTITIONER

## 2024-03-27 PROCEDURE — 82948 REAGENT STRIP/BLOOD GLUCOSE: CPT

## 2024-03-27 PROCEDURE — 63710000001 INSULIN REGULAR HUMAN PER 5 UNITS: Performed by: EMERGENCY MEDICINE

## 2024-03-27 PROCEDURE — 99231 SBSQ HOSP IP/OBS SF/LOW 25: CPT

## 2024-03-27 PROCEDURE — 99221 1ST HOSP IP/OBS SF/LOW 40: CPT | Performed by: NURSE PRACTITIONER

## 2024-03-27 PROCEDURE — 25010000002 LORAZEPAM PER 2 MG: Performed by: INTERNAL MEDICINE

## 2024-03-27 PROCEDURE — 83036 HEMOGLOBIN GLYCOSYLATED A1C: CPT | Performed by: NURSE PRACTITIONER

## 2024-03-27 PROCEDURE — 84100 ASSAY OF PHOSPHORUS: CPT | Performed by: NURSE PRACTITIONER

## 2024-03-27 PROCEDURE — 80048 BASIC METABOLIC PNL TOTAL CA: CPT | Performed by: NURSE PRACTITIONER

## 2024-03-27 PROCEDURE — 63710000001 INSULIN LISPRO (HUMAN) PER 5 UNITS: Performed by: NURSE PRACTITIONER

## 2024-03-27 PROCEDURE — 80061 LIPID PANEL: CPT | Performed by: NURSE PRACTITIONER

## 2024-03-27 RX ORDER — SUCRALFATE 1 G/1
1 TABLET ORAL 3 TIMES DAILY
COMMUNITY

## 2024-03-27 RX ORDER — ROSUVASTATIN CALCIUM 10 MG/1
10 TABLET, COATED ORAL DAILY
COMMUNITY

## 2024-03-27 RX ORDER — ONDANSETRON 4 MG/1
4 TABLET, FILM COATED ORAL EVERY 6 HOURS PRN
COMMUNITY
End: 2024-04-11 | Stop reason: HOSPADM

## 2024-03-27 RX ORDER — BUSPIRONE HYDROCHLORIDE 10 MG/1
10 TABLET ORAL 3 TIMES DAILY
COMMUNITY
End: 2024-04-11 | Stop reason: HOSPADM

## 2024-03-27 RX ORDER — MIDODRINE HYDROCHLORIDE 2.5 MG/1
2.5 TABLET ORAL
COMMUNITY

## 2024-03-27 RX ORDER — PREGABALIN 50 MG/1
50 CAPSULE ORAL DAILY
COMMUNITY

## 2024-03-27 RX ORDER — PREGABALIN 100 MG/1
200 CAPSULE ORAL NIGHTLY
COMMUNITY

## 2024-03-27 RX ORDER — ISOSORBIDE MONONITRATE 30 MG/1
30 TABLET, EXTENDED RELEASE ORAL DAILY
COMMUNITY

## 2024-03-27 RX ORDER — LANOLIN ALCOHOL/MO/W.PET/CERES
6 CREAM (GRAM) TOPICAL NIGHTLY
Status: DISCONTINUED | OUTPATIENT
Start: 2024-03-27 | End: 2024-04-11 | Stop reason: HOSPADM

## 2024-03-27 RX ORDER — LORAZEPAM 2 MG/ML
1 INJECTION INTRAMUSCULAR EVERY 4 HOURS PRN
Status: DISPENSED | OUTPATIENT
Start: 2024-03-27 | End: 2024-04-01

## 2024-03-27 RX ORDER — METOCLOPRAMIDE 5 MG/1
5 TABLET ORAL 3 TIMES DAILY
COMMUNITY

## 2024-03-27 RX ORDER — VALSARTAN 40 MG/1
40 TABLET ORAL DAILY
COMMUNITY

## 2024-03-27 RX ORDER — TIMOLOL MALEATE 5 MG/ML
1 SOLUTION/ DROPS OPHTHALMIC 2 TIMES DAILY
COMMUNITY

## 2024-03-27 RX ORDER — LANOLIN ALCOHOL/MO/W.PET/CERES
3 CREAM (GRAM) TOPICAL NIGHTLY PRN
Status: ON HOLD | COMMUNITY
End: 2024-04-11

## 2024-03-27 RX ORDER — HALOPERIDOL 5 MG/ML
5 INJECTION INTRAMUSCULAR EVERY 6 HOURS PRN
Status: DISCONTINUED | OUTPATIENT
Start: 2024-03-27 | End: 2024-04-07

## 2024-03-27 RX ADMIN — LINEZOLID 600 MG: 600 INJECTION, SOLUTION INTRAVENOUS at 00:37

## 2024-03-27 RX ADMIN — LORAZEPAM 1 MG: 2 INJECTION INTRAMUSCULAR; INTRAVENOUS at 11:50

## 2024-03-27 RX ADMIN — SODIUM HYPOCHLORITE: 1.25 SOLUTION TOPICAL at 10:01

## 2024-03-27 RX ADMIN — ACETAMINOPHEN 650 MG: 650 SOLUTION ORAL at 21:09

## 2024-03-27 RX ADMIN — INSULIN HUMAN 10 UNITS: 100 INJECTION, SOLUTION PARENTERAL at 11:51

## 2024-03-27 RX ADMIN — INSULIN LISPRO 4 UNITS: 100 INJECTION, SOLUTION INTRAVENOUS; SUBCUTANEOUS at 09:08

## 2024-03-27 RX ADMIN — LORAZEPAM 1 MG: 2 INJECTION INTRAMUSCULAR; INTRAVENOUS at 22:40

## 2024-03-27 RX ADMIN — Medication 10 ML: at 20:38

## 2024-03-27 RX ADMIN — ACETAMINOPHEN 650 MG: 325 TABLET, FILM COATED ORAL at 10:05

## 2024-03-27 RX ADMIN — CARVEDILOL 6.25 MG: 6.25 TABLET, FILM COATED ORAL at 20:29

## 2024-03-27 RX ADMIN — MEROPENEM 1000 MG: 1 INJECTION, POWDER, FOR SOLUTION INTRAVENOUS at 00:45

## 2024-03-27 RX ADMIN — ZIPRASIDONE MESYLATE 20 MG: 20 INJECTION, POWDER, LYOPHILIZED, FOR SOLUTION INTRAMUSCULAR at 10:05

## 2024-03-27 RX ADMIN — MEROPENEM 1000 MG: 1 INJECTION, POWDER, FOR SOLUTION INTRAVENOUS at 17:26

## 2024-03-27 RX ADMIN — ACETAMINOPHEN 650 MG: 650 SOLUTION ORAL at 12:39

## 2024-03-27 RX ADMIN — APIXABAN 5 MG: 5 TABLET, FILM COATED ORAL at 20:29

## 2024-03-27 RX ADMIN — ZIPRASIDONE MESYLATE 20 MG: 20 INJECTION, POWDER, LYOPHILIZED, FOR SOLUTION INTRAMUSCULAR at 14:38

## 2024-03-27 RX ADMIN — Medication 10 ML: at 09:55

## 2024-03-27 RX ADMIN — INSULIN HUMAN 10 UNITS: 100 INJECTION, SOLUTION PARENTERAL at 09:09

## 2024-03-27 RX ADMIN — LINEZOLID 600 MG: 600 INJECTION, SOLUTION INTRAVENOUS at 11:51

## 2024-03-27 RX ADMIN — Medication 6 MG: at 20:29

## 2024-03-27 RX ADMIN — ZIPRASIDONE MESYLATE 20 MG: 20 INJECTION, POWDER, LYOPHILIZED, FOR SOLUTION INTRAMUSCULAR at 05:18

## 2024-03-27 RX ADMIN — INSULIN DETEMIR 30 UNITS: 100 INJECTION, SOLUTION SUBCUTANEOUS at 20:34

## 2024-03-27 RX ADMIN — MEROPENEM 1000 MG: 1 INJECTION, POWDER, FOR SOLUTION INTRAVENOUS at 09:11

## 2024-03-27 RX ADMIN — DEXTROSE 15 G: 15 GEL ORAL at 17:10

## 2024-03-27 RX ADMIN — DONEPEZIL HYDROCHLORIDE 10 MG: 10 TABLET, FILM COATED ORAL at 10:02

## 2024-03-27 NOTE — PROGRESS NOTES
[facility] Medicine Services  INPATIENT PROGRESS NOTE    Patient Name: Erick Luong  Date of Admission: 3/26/2024  Today's Date: 03/27/24  Length of Stay: 1  Primary Care Physician: Del Shetty MD    Subjective   Chief Complaint: Altered mental status    HPI   Patient was agitated this morning and received Haldol and Geodon.  He is lethargic at the time of my evaluation.  Blood cultures growing MRSA.    Review of Systems   All pertinent negatives and positives are as above. All other systems have been reviewed and are negative unless otherwise stated.     Objective    Temp:  [97.6 °F (36.4 °C)-102.3 °F (39.1 °C)] 98.3 °F (36.8 °C)  Heart Rate:  [] 90  Resp:  [18-22] 18  BP: (107-150)/(54-99) 132/99  Physical Exam  Constitutional:       General: He is not in acute distress.     Appearance: He is well-developed. He is not diaphoretic.   HENT:      Head: Normocephalic.   Eyes:      Conjunctiva/sclera: Conjunctivae normal.      Pupils: Pupils are equal, round, and reactive to light.   Neck:      Thyroid: No thyromegaly.      Vascular: No JVD.      Trachea: No tracheal deviation.   Cardiovascular:      Rate and Rhythm: Normal rate and regular rhythm.      Heart sounds: Normal heart sounds. No murmur heard.     No friction rub. No gallop.   Pulmonary:      Effort: Pulmonary effort is normal. No respiratory distress.      Breath sounds: Normal breath sounds. No stridor. No wheezing or rales.   Chest:      Chest wall: No tenderness.   Abdominal:      General: Bowel sounds are normal. There is no distension.      Palpations: Abdomen is soft. There is no mass.      Tenderness: There is no abdominal tenderness. There is no guarding or rebound.      Hernia: No hernia is present.   Musculoskeletal:         General: Swelling and signs of injury present. No tenderness or deformity. Normal range of motion.      Cervical back: Normal range of motion and neck supple.      Right lower  leg: No edema.      Comments: Left foot with ulcers noted on dorsal and plantar surface/heel   Lymphadenopathy:      Cervical: No cervical adenopathy.   Skin:     General: Skin is warm and dry.      Coloration: Skin is not pale.      Findings: No erythema or rash.   Neurological:      General: No focal deficit present.      Mental Status: He is alert and oriented to person, place, and time.      Cranial Nerves: No cranial nerve deficit.      Sensory: No sensory deficit.      Motor: No abnormal muscle tone.      Coordination: Coordination normal.   Psychiatric:         Mood and Affect: Mood normal.         Behavior: Behavior normal.            File Link    Scan on 3/26/2024 1423 by Ro Vinson RN: Wound 06/15/23 0102 Left anterior plantar        Key Information    Document ID File Type Document Type Description   [id number] Image WOUND IMAGE Wound 06/15/23 0102 Left anterior plantar     Import Information    Attached At Date Time User Dept   Encounter Level 3/26/2024  2:23 PM Ro Vinson RN [facility]     Encounter    Hospital Encounter on 3/26/24         Results Review:  I have reviewed the labs, radiology results, and diagnostic studies.    Laboratory Data:   Results from last 7 days   Lab Units 03/27/24  0449 03/26/24  1326   WBC 10*3/mm3 12.75* 14.69*   HEMOGLOBIN g/dL 11.8* 11.3*   HEMATOCRIT % 35.9* 33.8*   PLATELETS 10*3/mm3 163 171        Results from last 7 days   Lab Units 03/27/24  0449 03/26/24  1326   SODIUM mmol/L 142 140   POTASSIUM mmol/L 3.5 3.4*   CHLORIDE mmol/L 103 103   CO2 mmol/L 25.0 23.0   BUN mg/dL 29* 29*   CREATININE mg/dL 2.07* 1.93*   CALCIUM mg/dL 8.9 9.5   BILIRUBIN mg/dL  --  0.7   ALK PHOS U/L  --  91   ALT (SGPT) U/L  --  8   AST (SGOT) U/L  --  13   GLUCOSE mg/dL 169* 438*       Culture Data:   Blood Culture   Date Value Ref Range Status   03/26/2024 Abnormal Stain (C)  Preliminary   03/26/2024 Abnormal Stain (C)  Preliminary       Radiology Data:    Imaging Results (Last 24 Hours)       Procedure Component Value Units Date/Time    XR Foot 3+ View Left [[id number]] Collected: 03/26/24 1723     Updated: 03/26/24 1728    Narrative:      EXAMINATION: XR FOOT 3+ VW LEFT-     3/26/2024 4:19 PM     HISTORY: foot infection     3 view LEFT foot exam.     Previous amputation of the mid/distal fifth metatarsal.     Interval resection or resorption of the fourth metatarsal head.     No bone destruction or soft tissue air is seen.     High-grade degenerative disease at the first metatarsal phalangeal  joint.     Unchanged appearance of prominent dorsal midfoot spurring and prominent  inferior calcaneal spurring.     Mild soft tissue edema over the dorsum of the foot is less prominent as  compared with the previous exam.       Impression:      1. No bone destruction or soft tissue air is seen.  2. Prominent degenerative spurring.           This report was signed and finalized on 3/26/2024 5:25 PM by Dr. Gomez Mcgill MD.               I have reviewed the patient's current medications.     Assessment/Plan   Assessment  Active Hospital Problems    Diagnosis     **Sepsis     Diabetic foot infection     Chronic kidney disease (CKD), stage IV (severe)     Chronic diastolic heart failure     BPH without obstruction/lower urinary tract symptoms     Diabetic polyneuropathy associated with type 2 diabetes mellitus     Anemia due to chronic kidney disease     Essential hypertension     Type 2 diabetes mellitus with hyperglycemia, with long-term current use of insulin    MRSA bacteremia  Metabolic encephalopathy secondary to sepsis    Treatment Plan  Continue IV antibiotics with Zyvox and meropenem.  Podiatry consultation input appreciated.  Will follow recommendations.  Follow-up MRI to rule out osteomyelitis.  Repeat blood cultures in the next 24 to 48 hours to assess for clearing of MRSA bacteremia.    Pain control with opiate pain medications.    Insulin sliding scale and Levemir  for type 2 diabetes mellitus    IV Haldol as needed for agitation    DVT prophylaxis with Eliquis    PT/OT/wound care consultations    DVT prophylaxis with Eliquis    CODE STATUS is full code    Medical Decision Making  Number and Complexity of problems: 4 acute, severe, high complexity medical problems.  Multiple chronic medical problems.  Differential Diagnosis: None    Conditions and Status        Condition is worsening.     [facility] Data  External documents reviewed: None  Cardiac tracing (EKG, telemetry) interpretation: None  Radiology interpretation: None  Labs reviewed: CBC, BMP, blood cultures reviewed by me  Any tests that were considered but not ordered: None     Decision rules/scores evaluated (example [id number], [place], etc): N/A     Discussed with: Patient     Care Planning  Shared decision making: Patient and his wife  Code status and discussions: CODE STATUS is full code    Disposition  Social Determinants of Health that impact treatment or disposition: None  I expect the patient to be discharged to skilled nursing facility in 5 to 7 days.     Electronically signed by Rene Maloney MD, 03/27/24, 17:19 CDT.

## 2024-03-27 NOTE — CONSULTS
[facility]  INPATIENT WOUND & OSTOMY CONSULTATION    Today's Date: 03/27/24    Patient Name: Erick Luong  MRN: [id number]  CSN: [id number]  PCP: Del Shetty MD  Referring Provider:   Consulting Provider (From admission, onward)      Start Ordered     Status Ordering Provider    03/26/24 1806 03/26/24 1815  Inpatient Wound Care MD Consult  Once        Specialty:  Wound Care  Provider:  Dunia Wolfe APRN Acknowledged THOMPSON, TERRI D           Attending Provider: Rene Maloney MD  Length of Stay: 1    SUBJECTIVE   Chief Complaint: Wounds of left foot    HPI: Erick Luong, a 68 y.o.male, presents with a past medical history of arthritis, coronary artery disease, type 2 diabetes, hypertension, hyperlipidemia, myocardial infarction, osteomyelitis, neuropathy.  A full past medical history as listed below.  Patient presented to the hospital on 3/26 due to altered mental status.  Patient is found to be septic and admitted for treatment.  Inpatient wound care consulted due to wounds of left foot.  Patient sees Dr. Gomes in [place] for wound care.  Wounds are treated with Santyl.  Patient is currently somnolent and lethargic.  He is resting in bed.      Visit Dx:    ICD-10-CM ICD-9-CM   1. Diabetic foot infection  E11.628 250.80    L08.9 686.9   2. Poorly controlled diabetes mellitus  E11.65 250.00       Hospital Problem List:     Sepsis    Essential hypertension    Type 2 diabetes mellitus with hyperglycemia, with long-term current use of insulin    Anemia due to chronic kidney disease    Diabetic polyneuropathy associated with type 2 diabetes mellitus    BPH without obstruction/lower urinary tract symptoms    Chronic diastolic heart failure    Chronic kidney disease (CKD), stage IV (severe)    Diabetic foot infection      History:   Past Medical History:   Diagnosis Date    Arthritis     Autonomic disease     CAD (coronary artery disease) 02/06/2017    Cervical radiculopathy  09/16/2021    Chronic constipation with acute exaccerbation 05/10/2021    Coronary artery disease     Degeneration of cervical intervertebral disc 08/11/2021    Diabetes mellitus     Diabetic foot ulcer 08/31/2020    Diabetic polyneuropathy associated with type 2 diabetes mellitus 01/18/2021    Elevated cholesterol     Gastroesophageal reflux disease 05/13/2019    Headache     HTN (hypertension), benign 05/03/2017    Hyperlipidemia     Hypertension     Mixed hyperlipidemia 02/07/2017    Multiple lung nodules 01/26/2020    5mm, 9 mm RLL identified 1/2020, not present 10/2019.    Myocardial infarction     Osteomyelitis 01/22/2020    Osteomyelitis of fifth toe of right foot 10/07/2019    Pancreatitis     Persistent insomnia 01/20/2020    Renal disorder     Sleep apnea 02/06/2017    Sleep apnea with use of continuous positive airway pressure (CPAP)     NON-COMPLIANT    Slow transit constipation 01/16/2019    Spinal stenosis in cervical region 09/16/2021    Vitamin D deficiency 03/02/2021     Past Surgical History:   Procedure Laterality Date    ABDOMINAL SURGERY      AMPUTATION FOOT / TOE Left 10/2021    5th digit     ANTERIOR CERVICAL DISCECTOMY W/ FUSION N/A 8/5/2022    Procedure: CERVICAL DISCECTOMY ANTERIOR WITH FUSION C5-6 with possible plating of C5-7 with neuromonitoring and 1 c-arm;  Surgeon: Karel Soliz MD;  Location:  [facility] OR;  Service: Neurosurgery;  Laterality: N/A;    APPENDECTOMY      BACK SURGERY      CARDIAC CATHETERIZATION Left 02/08/2021    Procedure: Left Heart Cath w poss intervention left anatomical snuff box acess;  Surgeon: Omkar Charles DO;  Location:  [facility] CATH INVASIVE LOCATION;  Service: Cardiology;  Laterality: Left;    CARDIAC SURGERY      CATARACT EXTRACTION      CERVICAL SPINE SURGERY      COLONOSCOPY N/A 01/31/2017    Normal exam repeat in 5 years    COLONOSCOPY N/A 02/11/2019    Mild acute inflammation    COLONOSCOPY W/ POLYPECTOMY  03/04/2014    Hyperplastic polyp  "   CORONARY ARTERY BYPASS GRAFT  10/2015    ENDOSCOPY  2011    Gastritis with hemorrhage    ENDOSCOPY N/A 2017    Normal exam    ENDOSCOPY N/A 2019    Gastritis    ENDOSCOPY N/A 2020    Non-erosive gastritis with hemorrhage    ENDOSCOPY N/A 02/10/2021    Esophagitis    FOOT SURGERY Left     INCISION AND DRAINAGE OF WOUND Left 2007    spider bite     Social History     Socioeconomic History    Marital status:    Tobacco Use    Smoking status: Former     Current packs/day: 0.00     Types: Cigarettes     Quit date:      Years since quittin.2    Smokeless tobacco: Never    Tobacco comments:     smoked in [employer]   Vaping Use    Vaping status: Never Used   Substance and Sexual Activity    Alcohol use: No    Drug use: No    Sexual activity: Defer     Family History   Problem Relation Age of Onset    Colon cancer Father     Heart disease Father     Colon cancer Sister     Colon polyps Sister     Alzheimer's disease Mother     Coronary artery disease Sister     Coronary artery disease Sister        Allergies:  Allergies   Allergen Reactions    Cefepime Hives and Anaphylaxis    Bactrim [Sulfamethoxazole-Trimethoprim] Other (See Comments)     \"RENAL FAILURE\"    Vancomycin Itching    Zolpidem Unknown - High Severity     \"makes him crazy\"    Zolpidem Tartrate Unknown - Low Severity and Provider Review Needed    Metronidazole Rash       Medications:    Current Facility-Administered Medications:     acetaminophen (TYLENOL) tablet 650 mg, 650 mg, Oral, Q4H PRN, 650 mg at 24 183 **OR** acetaminophen (TYLENOL) 160 MG/5ML oral solution 650 mg, 650 mg, Oral, Q4H PRN **OR** acetaminophen (TYLENOL) suppository 650 mg, 650 mg, Rectal, Q4H PRN, Raquel Duncan, APRN    apixaban (ELIQUIS) tablet 5 mg, 5 mg, Oral, Q12H, Raquel Duncan, APRN, 5 mg at 24    sennosides-docusate (PERICOLACE) 8.6-50 MG per tablet 1 tablet, 1 tablet, Oral, BID, 1 tablet at 24 **AND** " polyethylene glycol (MIRALAX) packet 17 g, 17 g, Oral, Daily PRN **AND** bisacodyl (DULCOLAX) EC tablet 5 mg, 5 mg, Oral, Daily PRN **AND** bisacodyl (DULCOLAX) suppository 10 mg, 10 mg, Rectal, Daily PRN, Raquel Duncan, APRN    bumetanide (BUMEX) tablet 1 mg, 1 mg, Oral, Daily, Raquel Duncan, APRN, 1 mg at 03/26/24 2057    carvedilol (COREG) tablet 6.25 mg, 6.25 mg, Oral, BID, Raquel Duncan, APRN, 6.25 mg at 03/26/24 2057    dextrose (D50W) (25 g/50 mL) IV injection 25 g, 25 g, Intravenous, Q15 Min PRN, Raquel Duncan, APRN    dextrose (GLUTOSE) oral gel 15 g, 15 g, Oral, Q15 Min PRN, Raquel Duncan, APRN    donepezil (ARICEPT) tablet 10 mg, 10 mg, Oral, Daily, Raquel Duncan, APRN    glucagon (GLUCAGEN) injection 1 mg, 1 mg, Intramuscular, Q15 Min PRN, Raquel Duncan, APRN    insulin detemir (LEVEMIR) injection 30 Units, 30 Units, Subcutaneous, Nightly, Raquel Duncan, APRN, 30 Units at 03/26/24 2213    Insulin Lispro (humaLOG) injection 4-24 Units, 4-24 Units, Subcutaneous, 4x Daily AC & at Bedtime, Raquel Duncan APRN, 4 Units at 03/26/24 2213    insulin regular (humuLIN R,novoLIN R) injection 10 Units, 10 Units, Subcutaneous, TID AC, Steve De Jesus MD, 10 Units at 03/26/24 1737    Linezolid (ZYVOX) 600 mg 300 mL, 600 mg, Intravenous, Q12H, Raquel Duncan, APRN, Last Rate: 300 mL/hr at 03/27/24 0037, 600 mg at 03/27/24 0037    meropenem (MERREM) 1,000 mg in sodium chloride 0.9 % 100 mL MBP, 1,000 mg, Intravenous, Q8H, Raquel Duncan, SUSAN, 1,000 mg at 03/27/24 0045    midodrine (PROAMATINE) tablet 10 mg, 10 mg, Oral, TID AC, Raquel Duncan APRN    nitroglycerin (NITROSTAT) SL tablet 0.4 mg, 0.4 mg, Sublingual, Q5 Min PRN, Raquel Duncan APRN    ondansetron ODT (ZOFRAN-ODT) disintegrating tablet 4 mg, 4 mg, Oral, Q6H PRN **OR** ondansetron (ZOFRAN) injection 4 mg, 4 mg, Intravenous, Q6H PRN, Raquel Duncan APRN    oxyCODONE (ROXICODONE) immediate release  tablet 10 mg, 10 mg, Oral, BID PRN, Raquel Duncan, APRN    pantoprazole (PROTONIX) EC tablet 40 mg, 40 mg, Oral, BID, Raquel Duncan, APRN, 40 mg at 03/26/24 2104    sodium chloride 0.9 % flush 10 mL, 10 mL, Intravenous, PRN, Steve De Jesus MD    sodium chloride 0.9 % flush 10 mL, 10 mL, Intravenous, Q12H, Raquel Duncan, APRN    sodium chloride 0.9 % flush 10 mL, 10 mL, Intravenous, PRN, Raquel Duncan, APRN    sodium chloride 0.9 % infusion 40 mL, 40 mL, Intravenous, PRN, Raquel Duncan, APRN    sodium hypochlorite (DAKIN'S 1/4 STRENGTH) 0.125 % topical solution 0.125% solution, , Topical, BID, Raquel Duncan, APRN, Given at 03/26/24 2105    ziprasidone (GEODON) injection 20 mg, 20 mg, Intramuscular, Q4H PRN, Laury, Nabeel, DO, 20 mg at 03/27/24 0518    OBJECTIVE     Vitals:    03/27/24 0348   BP: 134/54   Pulse: 94   Resp: 18   Temp: 97.6 °F (36.4 °C)   SpO2: 97%       PHYSICAL EXAM:   Physical Exam  Vitals and nursing note reviewed.   Constitutional:       Appearance: He is obese. He is ill-appearing.      Comments: Body mass index is 39.22 kg/m².    HENT:      Head: Normocephalic and atraumatic.   Eyes:      General: Lids are normal. Gaze aligned appropriately.   Cardiovascular:      Rate and Rhythm: Normal rate and regular rhythm.   Pulmonary:      Effort: Pulmonary effort is normal. No respiratory distress.   Abdominal:      General: Abdomen is protuberant.      Palpations: Abdomen is soft.   Musculoskeletal:      Cervical back: Normal range of motion and neck supple.   Feet:      Left foot:      Skin integrity: Ulcer and erythema present.      Comments: Patient presents with 3 open wounds of left foot.  All wounds are to subcutaneous tissue.  Wound of left distal plantar foot has slough present.  Edges touched wound bed.  No undermining or tunneling noted.  Left heel wound is to subcutaneous tissue with small amount of serous drainage present wound bed.  Periwound area is dry  with slight callus formulation.  Left medial foot has large amount of slough and eschar present over wound bed.  Very minimal drainage noted to wound.  Significant erythema periwound area.    Skin:     General: Skin is warm and dry.      Findings: Wound (3 wounds of left foot) present.   Neurological:      Mental Status: He is lethargic.      Motor: Weakness present.   Psychiatric:         Attention and Perception: He is inattentive.         Mood and Affect: Affect is flat.         Speech: He is noncommunicative.                        Results Review:  Lab Results (last 48 hours)       Procedure Component Value Units Date/Time    Basic Metabolic Panel [[id number]]  (Abnormal) Collected: 03/27/24 0449    Specimen: Blood Updated: 03/27/24 0532     Glucose 169 mg/dL      BUN 29 mg/dL      Creatinine 2.07 mg/dL      Sodium 142 mmol/L      Potassium 3.5 mmol/L      Chloride 103 mmol/L      CO2 25.0 mmol/L      Calcium 8.9 mg/dL      BUN/Creatinine Ratio 14.0     Anion Gap 14.0 mmol/L      eGFR 34.5 mL/min/1.73     Narrative:      GFR Normal >60  Chronic Kidney Disease <60  Kidney Failure <15      Lipid Panel [[id number]] Collected: 03/27/24 0449    Specimen: Blood Updated: 03/27/24 0532     Total Cholesterol 143 mg/dL      Triglycerides 106 mg/dL      HDL Cholesterol 48 mg/dL      LDL Cholesterol  76 mg/dL      VLDL Cholesterol 19 mg/dL      LDL/HDL Ratio 1.54    Narrative:      Cholesterol Reference Ranges  (U.S. Department of Health and Human Services ATP III Classifications)    Desirable          <200 mg/dL  Borderline High    200-239 mg/dL  High Risk          >240 mg/dL      Triglyceride Reference Ranges  (U.S. Department of Health and Human Services ATP III Classifications)    Normal           <150 mg/dL  Borderline High  150-199 mg/dL  High             200-499 mg/dL  Very High        >500 mg/dL    HDL Reference Ranges  (U.S. Department of Health and Human Services ATP III Classifications)    Low     <40 mg/dl (major  risk factor for CHD)  High    >60 mg/dl ('negative' risk factor for CHD)        LDL Reference Ranges  (U.S. Department of Health and Human Services ATP III Classifications)    Optimal          <100 mg/dL  Near Optimal     100-129 mg/dL  Borderline High  130-159 mg/dL  High             160-189 mg/dL  Very High        >189 mg/dL    Magnesium [[id number]]  (Normal) Collected: 03/27/24 0449    Specimen: Blood Updated: 03/27/24 0532     Magnesium 1.7 mg/dL     C-reactive Protein [[id number]]  (Abnormal) Collected: 03/27/24 0449    Specimen: Blood Updated: 03/27/24 0532     C-Reactive Protein 16.33 mg/dL     Phosphorus [[id number]]  (Abnormal) Collected: 03/27/24 0449    Specimen: Blood Updated: 03/27/24 0532     Phosphorus 2.0 mg/dL     TSH [[id number]]  (Normal) Collected: 03/27/24 0449    Specimen: Blood Updated: 03/27/24 0532     TSH 0.571 uIU/mL     STAT Lactic Acid, Reflex [[id number]]  (Normal) Collected: 03/27/24 0450    Specimen: Blood Updated: 03/27/24 0521     Lactate 1.5 mmol/L     Sedimentation Rate [[id number]]  (Abnormal) Collected: 03/27/24 0449    Specimen: Blood Updated: 03/27/24 0519     Sed Rate 35 mm/hr     Hemoglobin A1c [[id number]]  (Abnormal) Collected: 03/27/24 0450    Specimen: Blood Updated: 03/27/24 0513     Hemoglobin A1C 10.80 %     Narrative:      Hemoglobin A1C Ranges:    Increased Risk for Diabetes  5.7% to 6.4%  Diabetes                     >= 6.5%  Diabetic Goal                < 7.0%    CBC Auto Differential [[id number]]  (Abnormal) Collected: 03/27/24 0449    Specimen: Blood Updated: 03/27/24 0458     WBC 12.75 10*3/mm3      RBC 4.12 10*6/mm3      Hemoglobin 11.8 g/dL      Hematocrit 35.9 %      MCV 87.1 fL      MCH 28.6 pg      MCHC 32.9 g/dL      RDW 14.5 %      RDW-SD 46.3 fl      MPV 11.6 fL      Platelets 163 10*3/mm3      Neutrophil % 86.5 %      Lymphocyte % 5.2 %      Monocyte % 7.5 %      Eosinophil % 0.0 %      Basophil % 0.2 %      Immature Grans % 0.6 %      Neutrophils, Absolute  11.03 10*3/mm3      Lymphocytes, Absolute 0.66 10*3/mm3      Monocytes, Absolute 0.95 10*3/mm3      Eosinophils, Absolute 0.00 10*3/mm3      Basophils, Absolute 0.03 10*3/mm3      Immature Grans, Absolute 0.08 10*3/mm3      nRBC 0.0 /100 WBC     Blood Culture ID, PCR - Blood, Arm, Left [[id number]]  (Abnormal) Collected: 03/26/24 1346    Specimen: Blood from Arm, Left Updated: 03/27/24 0426     BCID, PCR Staph aureus. mecA/C and MREJ (methicillin resistance gene) detected. Identification by BCID2 PCR.     BOTTLE TYPE Aerobic Bottle    Narrative:      Infectious disease consultation is highly recommended to rule out distant foci of infection.    CANDIDA AURIS SCREEN - Swab, Axilla Right, Axilla Left and Groin [[id number]] Collected: 03/27/24 0351    Specimen: Swab from Axilla Right, Axilla Left and Groin Updated: 03/27/24 0401    POC Glucose Once [[id number]]  (Abnormal) Collected: 03/27/24 0350    Specimen: Blood Updated: 03/27/24 0400     Glucose 159 mg/dL      Comment: : [id number] Leonard BrittaneyMeter ID: [id number]       Blood Culture - Blood, Arm, Right [[id number]]  (Abnormal) Collected: 03/26/24 1346    Specimen: Blood from Arm, Right Updated: 03/27/24 0356     Blood Culture Abnormal Stain     Gram Stain Aerobic Bottle Gram positive cocci      Anaerobic Bottle Gram positive cocci    Blood Culture - Blood, Arm, Left [[id number]]  (Abnormal) Collected: 03/26/24 1346    Specimen: Blood from Arm, Left Updated: 03/27/24 0355     Blood Culture Abnormal Stain     Gram Stain Aerobic Bottle Gram positive cocci      Anaerobic Bottle Gram positive cocci    STAT Lactic Acid, Reflex [[id number]]  (Abnormal) Collected: 03/26/24 2229    Specimen: Blood Updated: 03/26/24 2319     Lactate 2.8 mmol/L     POC Glucose Once [[id number]]  (Abnormal) Collected: 03/26/24 2016    Specimen: Blood Updated: 03/26/24 2028     Glucose 156 mg/dL      Comment: : [id number] Leonard BrittaneyMeter ID: [id number]       STAT Lactic Acid, Reflex  [[id number]]  (Abnormal) Collected: 03/26/24 1923    Specimen: Blood Updated: 03/26/24 1959     Lactate 3.9 mmol/L     Respiratory Panel PCR w/COVID-19(SARS-CoV-2) [facility] In-House, NP Swab in UTM/VTM, 2 HR TAT - Swab, Nasopharynx [[id number]]  (Normal) Collected: 03/26/24 1732    Specimen: Swab from Nasopharynx Updated: 03/26/24 1905     ADENOVIRUS, PCR Not Detected     Coronavirus 229E Not Detected     Coronavirus HKU1 Not Detected     Coronavirus NL63 Not Detected     Coronavirus OC43 Not Detected     COVID19 Not Detected     Human Metapneumovirus Not Detected     Human Rhinovirus/Enterovirus Not Detected     Influenza A PCR Not Detected     Influenza B PCR Not Detected     Parainfluenza Virus 1 Not Detected     Parainfluenza Virus 2 Not Detected     Parainfluenza Virus 3 Not Detected     Parainfluenza Virus 4 Not Detected     RSV, PCR Not Detected     Bordetella pertussis pcr Not Detected     Bordetella parapertussis PCR Not Detected     Chlamydophila pneumoniae PCR Not Detected     Mycoplasma pneumo by PCR Not Detected    Narrative:      In the setting of a positive respiratory panel with a viral infection PLUS a negative procalcitonin without other underlying concern for bacterial infection, consider observing off antibiotics or discontinuation of antibiotics and continue supportive care. If the respiratory panel is positive for atypical bacterial infection (Bordetella pertussis, Chlamydophila pneumoniae, or Mycoplasma pneumoniae), consider antibiotic de-escalation to target atypical bacterial infection.    C-reactive Protein [[id number]]  (Abnormal) Collected: 03/26/24 1326    Specimen: Blood Updated: 03/26/24 1818     C-Reactive Protein 4.30 mg/dL     POC Glucose Once [[id number]]  (Abnormal) Collected: 03/26/24 1724    Specimen: Blood Updated: 03/26/24 1736     Glucose 241 mg/dL      Comment: : [id number] Karel NisaMaricarmen ID: [id number]       Sedimentation Rate [[id number]]  (Abnormal)  Collected: 03/26/24 1326    Specimen: Blood Updated: 03/26/24 1713     Sed Rate 39 mm/hr     STAT Lactic Acid, Reflex [[id number]]  (Abnormal) Collected: 03/26/24 1624    Specimen: Blood from Arm, Left Updated: 03/26/24 1658     Lactate 3.4 mmol/L     Urinalysis With Culture If Indicated - Urine, Catheter [[id number]]  (Abnormal) Collected: 03/26/24 1434    Specimen: Urine, Catheter Updated: 03/26/24 1500     Color, UA Yellow     Appearance, UA Clear     pH, UA 5.5     Specific Gravity, UA 1.020     Glucose,  mg/dL (2+)     Ketones, UA Trace     Bilirubin, UA Negative     Blood, UA Small (1+)     Protein, UA >=300 mg/dL (3+)     Leuk Esterase, UA Negative     Nitrite, UA Negative     Urobilinogen, UA 0.2 E.U./dL    Narrative:      In absence of clinical symptoms, the presence of pyuria, bacteria, and/or nitrites on the urinalysis result does not correlate with infection.    Urinalysis, Microscopic Only - Urine, Catheter [[id number]]  (Abnormal) Collected: 03/26/24 1434    Specimen: Urine, Catheter Updated: 03/26/24 1500     RBC, UA 0-2 /HPF      WBC, UA 0-2 /HPF      Comment: Urine culture not indicated.        Bacteria, UA Trace /HPF      Squamous Epithelial Cells, UA 3-6 /HPF      Hyaline Casts, UA 0-2 /LPF      Methodology Manual Light Microscopy    [facility] Draw [[id number]] Collected: 03/26/24 1326    Specimen: Blood Updated: 03/26/24 1432    Narrative:      The following orders were created for panel order [facility] Draw.  Procedure                               Abnormality         Status                     ---------                               -----------         ------                     Green Top (Gel)[[id number]]                                  Final result               Lavender Top[[id number]]                                     Final result               Red Top[[id number]]                                          Final result               Light Blue Top[[id number]]                                    Final result                 Please view results for these tests on the individual orders.    Green Top (Gel) [[id number]] Collected: 03/26/24 1326    Specimen: Blood Updated: 03/26/24 1432     Extra Tube Hold for add-ons.     Comment: Auto resulted.       Lavender Top [[id number]] Collected: 03/26/24 1326    Specimen: Blood Updated: 03/26/24 1432     Extra Tube hold for add-on     Comment: Auto resulted       Red Top [[id number]] Collected: 03/26/24 1326    Specimen: Blood Updated: 03/26/24 1432     Extra Tube Hold for add-ons.     Comment: Auto resulted.       Light Blue Top [[id number]] Collected: 03/26/24 1326    Specimen: Blood Updated: 03/26/24 1432     Extra Tube Hold for add-ons.     Comment: Auto resulted       Comprehensive Metabolic Panel [[id number]]  (Abnormal) Collected: 03/26/24 1326    Specimen: Blood Updated: 03/26/24 1418     Glucose 438 mg/dL      BUN 29 mg/dL      Creatinine 1.93 mg/dL      Sodium 140 mmol/L      Potassium 3.4 mmol/L      Chloride 103 mmol/L      CO2 23.0 mmol/L      Calcium 9.5 mg/dL      Total Protein 6.5 g/dL      Albumin 3.7 g/dL      ALT (SGPT) 8 U/L      AST (SGOT) 13 U/L      Alkaline Phosphatase 91 U/L      Total Bilirubin 0.7 mg/dL      Globulin 2.8 gm/dL      A/G Ratio 1.3 g/dL      BUN/Creatinine Ratio 15.0     Anion Gap 14.0 mmol/L      eGFR 37.5 mL/min/1.73     Narrative:      GFR Normal >60  Chronic Kidney Disease <60  Kidney Failure <15      Magnesium [[id number]]  (Normal) Collected: 03/26/24 1326    Specimen: Blood Updated: 03/26/24 1418     Magnesium 1.7 mg/dL     Lactic Acid, Plasma [[id number]]  (Abnormal) Collected: 03/26/24 1326    Specimen: Blood Updated: 03/26/24 1417     Lactate 3.6 mmol/L     Procalcitonin [[id number]]  (Abnormal) Collected: 03/26/24 1326    Specimen: Blood Updated: 03/26/24 1402     Procalcitonin 6.56 ng/mL     Narrative:      As a Marker for Sepsis (Non-Neonates):    1. <0.5 ng/mL represents a low risk of severe sepsis and/or septic shock.  2.  ">2 ng/mL represents a high risk of severe sepsis and/or septic shock.    As a Marker for Lower Respiratory Tract Infections that require antibiotic therapy:    PCT on Admission    Antibiotic Therapy       6-12 Hrs later    >0.5                Strongly Recommended  >0.25 - <0.5        Recommended   0.1 - 0.25          Discouraged              Remeasure/reassess PCT  <0.1                Strongly Discouraged     Remeasure/reassess PCT    As 28 day mortality risk marker: \"Change in Procalcitonin Result\" (>80% or <=80%) if Day 0 (or Day 1) and Day 4 values are available. Refer to [email or web address]://[email or web address] [email or web address] [email or web address]-pct-calculator.[email or web address]    Change in PCT <=80%  A decrease of PCT levels below or equal to 80% defines a positive change in PCT test result representing a higher risk for 28-day all-cause mortality of patients diagnosed with severe sepsis for septic shock.    Change in PCT >80%  A decrease of PCT levels of more than 80% defines a negative change in PCT result representing a lower risk for 28-day all-cause mortality of patients diagnosed with severe sepsis or septic shock.       Protime-INR [[id number]]  (Normal) Collected: 03/26/24 1326    Specimen: Blood Updated: 03/26/24 1345     Protime 14.3 Seconds      INR 1.07    CBC & Differential [[id number]]  (Abnormal) Collected: 03/26/24 1326    Specimen: Blood Updated: 03/26/24 1336    Narrative:      The following orders were created for panel order CBC & Differential.  Procedure                               Abnormality         Status                     ---------                               -----------         ------                     CBC Auto Differential[[id number]]        Abnormal            Final result                 Please view results for these tests on the individual orders.    CBC Auto Differential [[id number]]  (Abnormal) Collected: 03/26/24 1326    Specimen: Blood Updated: 03/26/24 1336     WBC 14.69 10*3/mm3      RBC 3.90 10*6/mm3      Hemoglobin 11.3 g/dL      Hematocrit " 33.8 %      MCV 86.7 fL      MCH 29.0 pg      MCHC 33.4 g/dL      RDW 14.4 %      RDW-SD 44.8 fl      MPV 12.4 fL      Platelets 171 10*3/mm3      Neutrophil % 89.1 %      Lymphocyte % 1.7 %      Monocyte % 8.1 %      Eosinophil % 0.1 %      Basophil % 0.2 %      Immature Grans % 0.8 %      Neutrophils, Absolute 13.09 10*3/mm3      Lymphocytes, Absolute 0.25 10*3/mm3      Monocytes, Absolute 1.19 10*3/mm3      Eosinophils, Absolute 0.01 10*3/mm3      Basophils, Absolute 0.03 10*3/mm3      Immature Grans, Absolute 0.12 10*3/mm3      nRBC 0.0 /100 WBC     POC Glucose Once [[id number]]  (Abnormal) Collected: 03/26/24 1315    Specimen: Blood Updated: 03/26/24 1326     Glucose 400 mg/dL      Comment: : [id number] Karel JennaMeter ID: [id number]             Imaging Results (Last 72 Hours)       Procedure Component Value Units Date/Time    XR Foot 3+ View Left [[id number]] Collected: 03/26/24 1723     Updated: 03/26/24 1728    Narrative:      EXAMINATION: XR FOOT 3+ VW LEFT-     3/26/2024 4:19 PM     HISTORY: foot infection     3 view LEFT foot exam.     Previous amputation of the mid/distal fifth metatarsal.     Interval resection or resorption of the fourth metatarsal head.     No bone destruction or soft tissue air is seen.     High-grade degenerative disease at the first metatarsal phalangeal  joint.     Unchanged appearance of prominent dorsal midfoot spurring and prominent  inferior calcaneal spurring.     Mild soft tissue edema over the dorsum of the foot is less prominent as  compared with the previous exam.       Impression:      1. No bone destruction or soft tissue air is seen.  2. Prominent degenerative spurring.           This report was signed and finalized on 3/26/2024 5:25 PM by Dr. Gomez Mcgill MD.       XR Chest 1 View [[id number]] Collected: 03/26/24 1433     Updated: 03/26/24 1437    Narrative:      EXAM: XR CHEST 1 VW-      DATE: 3/26/2024 1:15 PM     HISTORY: ams       COMPARISON: 8/28/2023.      TECHNIQUE:  Frontal view(s) of the chest submitted.     FINDINGS:    Patient is status post median sternotomy and CABG. There is enlargement  of the cardiac silhouette. There are low lung volumes with vascular  crowding versus mild volume overload. No effusion or pneumothorax is  seen.          Impression:         1. Postoperative chest and low lung volumes with vascular crowding  versus mild volume overload.     This report was signed and finalized on 3/26/2024 2:34 PM by Eran Christina.       CT Head Without Contrast [[id number]] Collected: 03/26/24 1408     Updated: 03/26/24 1413    Narrative:      EXAM: CT HEAD WO CONTRAST-      DATE: 3/26/2024 1:03 PM     HISTORY: ams       COMPARISON: 10/10/2023.     DOSE LENGTH PRODUCT: 876.8 mGy.cm  Automated exposure control was also  utilized to decrease patient radiation dose.     TECHNIQUE: Unenhanced CT images obtained from vertex to skull base with  multiplanar reformats.     FINDINGS:  There is no acute intracranial hemorrhage, midline shift, mass effect,  or hydrocephalus. There is no CT evidence for acute infarct.     There are chronic changes with volume loss and chronic small vessel  ischemic change of the periventricular white matter.      SOFT TISSUES: The scalp soft tissues are unremarkable.        SINUS: There is partially imaged mucosal thickening at the right  maxillary sinus.     ORBITS: The visualized orbits and globes are unremarkable. There is  bilateral lens extraction.          Impression:      1. Chronic changes and no acute intracranial findings.      This report was signed and finalized on 3/26/2024 2:10 PM by Eran Christina.                  ASSESSMENT/PLAN       Examination and evaluation of wound(s) was performed.    DIAGNOSIS:   Non-pressure chronic ulcer of other part of left foot with fat layer exposed x2  Non-pressure chronic ulcer of left heel with fat layer exposed    Sepsis    Essential hypertension    Type 2 diabetes mellitus with  hyperglycemia, with long-term current use of insulin    Anemia due to chronic kidney disease    Diabetic polyneuropathy associated with type 2 diabetes mellitus    BPH without obstruction/lower urinary tract symptoms    Chronic diastolic heart failure    Chronic kidney disease (CKD), stage IV (severe)    Diabetic foot infection       PLAN:   Current wound care orders are in place for Dakins to left foot.    Podiatry consult is in place.   Patient is receiving meropenem and clindamycin.   He follows with Dr. Gomes in [place] for wounds.   No other needs at this time as podiatry is consulted.       This document has been electronically signed by SUSAN Saab on 3/27/2024 06:34 CDT

## 2024-03-27 NOTE — PLAN OF CARE
Goal Outcome Evaluation:              Outcome Evaluation: Nutrition consult complete. Pt familiar from LTACH LOS last fall. Has been refusing PO meds during this admission, some confusion at times, and wrist restraints ordered. Will add Boost Glucose Control to all trays to help meet EEN; pt has a hx of refusing these supplements. Chronic left foot wound on admit. A1c 10.8%, up from 10.1% from last August. Wt down about 15lb from last fall. Will monitor POC per protocol.

## 2024-03-27 NOTE — CONSULTS
[facility] - PODIATRY    Today's Date: 03/27/24     Patient Name: Erick Luong  MRN: [id number]  CSN: [id number]  PCP: Del Shetty MD  Referring Provider: No ref. provider found  Attending Provider: Rene Maloney MD  Length of Stay: 1    SUBJECTIVE   Chief Complaint:     HPI: Erick Luong, a 68 y.o.male, who was admitted on 03/26/2024.  With a past medical history of arthritis, autonomic disease, CAD, chronic anemia, cervical radiculopathy, chronic constipation, diastolic dysfunction, degenerative disc disease, diabetes mellitus, diabetic foot ulcer, diabetic polyneuropathy, elevated cholesterol, GERD, hypertension, hyperlipidemia, MI, multiple lung nodules, osteomyelitis, pancreatitis, renal disorder, sleep apnea, spinal stenosis, vascular disease, vitamin D deficiency.     Unable to obtain history from patient today due to altered mental status.  Patient's wife was not present at bedside today during evaluation.    According to the admitting providers history, the patient's wife reported they were seen by Dr. Gomes on 03/25/2024.  During that visit, the plantar and dorsal wounds to the patient's left foot were debrided and treated with Santyl.  At that time the foot was noted to be erythematous and warm to touch, however it was said to be there lower no other signs of infection at that time.  The next day (03/26/2024) the patient was brought to the emergency room via EMS for altered mental status.     Most recent A1C 10.8%.  Most recent WBC 12.75.  Last Temp 101 F.        Past Medical History:   Diagnosis Date    Arthritis     Autonomic disease     CAD (coronary artery disease) 02/06/2017    Cervical radiculopathy 09/16/2021    Chronic constipation with acute exaccerbation 05/10/2021    Coronary artery disease     Degeneration of cervical intervertebral disc 08/11/2021    Diabetes mellitus     Diabetic foot ulcer 08/31/2020    Diabetic polyneuropathy associated with type 2 diabetes  mellitus 01/18/2021    Elevated cholesterol     Gastroesophageal reflux disease 05/13/2019    Headache     HTN (hypertension), benign 05/03/2017    Hyperlipidemia     Hypertension     Mixed hyperlipidemia 02/07/2017    Multiple lung nodules 01/26/2020    5mm, 9 mm RLL identified 1/2020, not present 10/2019.    Myocardial infarction     Osteomyelitis 01/22/2020    Osteomyelitis of fifth toe of right foot 10/07/2019    Pancreatitis     Persistent insomnia 01/20/2020    Renal disorder     Sleep apnea 02/06/2017    Sleep apnea with use of continuous positive airway pressure (CPAP)     NON-COMPLIANT    Slow transit constipation 01/16/2019    Spinal stenosis in cervical region 09/16/2021    Vitamin D deficiency 03/02/2021     Past Surgical History:   Procedure Laterality Date    ABDOMINAL SURGERY      AMPUTATION FOOT / TOE Left 10/2021    5th digit     ANTERIOR CERVICAL DISCECTOMY W/ FUSION N/A 8/5/2022    Procedure: CERVICAL DISCECTOMY ANTERIOR WITH FUSION C5-6 with possible plating of C5-7 with neuromonitoring and 1 c-arm;  Surgeon: Karel Soliz MD;  Location:  [facility] OR;  Service: Neurosurgery;  Laterality: N/A;    APPENDECTOMY      BACK SURGERY      CARDIAC CATHETERIZATION Left 02/08/2021    Procedure: Left Heart Cath w poss intervention left anatomical snuff box acess;  Surgeon: Omkar Charles DO;  Location:  [facility] CATH INVASIVE LOCATION;  Service: Cardiology;  Laterality: Left;    CARDIAC SURGERY      CATARACT EXTRACTION      CERVICAL SPINE SURGERY      COLONOSCOPY N/A 01/31/2017    Normal exam repeat in 5 years    COLONOSCOPY N/A 02/11/2019    Mild acute inflammation    COLONOSCOPY W/ POLYPECTOMY  03/04/2014    Hyperplastic polyp    CORONARY ARTERY BYPASS GRAFT  10/2015    ENDOSCOPY  04/13/2011    Gastritis with hemorrhage    ENDOSCOPY N/A 05/05/2017    Normal exam    ENDOSCOPY N/A 02/11/2019    Gastritis    ENDOSCOPY N/A 09/01/2020    Non-erosive gastritis with hemorrhage    ENDOSCOPY N/A  "02/10/2021    Esophagitis    FOOT SURGERY Left     INCISION AND DRAINAGE OF WOUND Left 2007    spider bite     Family History   Problem Relation Age of Onset    Colon cancer Father     Heart disease Father     Colon cancer Sister     Colon polyps Sister     Alzheimer's disease Mother     Coronary artery disease Sister     Coronary artery disease Sister      Social History     Socioeconomic History    Marital status:    Tobacco Use    Smoking status: Former     Current packs/day: 0.00     Types: Cigarettes     Quit date:      Years since quittin.2    Smokeless tobacco: Never    Tobacco comments:     smoked in highschool   Vaping Use    Vaping status: Never Used   Substance and Sexual Activity    Alcohol use: No    Drug use: No    Sexual activity: Defer     Allergies   Allergen Reactions    Cefepime Hives and Anaphylaxis    Bactrim [Sulfamethoxazole-Trimethoprim] Other (See Comments)     \"RENAL FAILURE\"    Vancomycin Itching    Zolpidem Unknown - High Severity     \"makes him crazy\"    Zolpidem Tartrate Unknown - Low Severity and Provider Review Needed    Metronidazole Rash     Current Facility-Administered Medications   Medication Dose Route Frequency Provider Last Rate Last Admin    acetaminophen (TYLENOL) tablet 650 mg  650 mg Oral Q4H PRN Raquel Duncan APRN   650 mg at 24 1005    Or    acetaminophen (TYLENOL) 160 MG/5ML oral solution 650 mg  650 mg Oral Q4H PRN Raquel Duncan APRN   650 mg at 24 1239    Or    acetaminophen (TYLENOL) suppository 650 mg  650 mg Rectal Q4H PRN Raquel Duncan APRN        apixaban (ELIQUIS) tablet 5 mg  5 mg Oral Q12H Raquel Duncan APRN   5 mg at 24    sennosides-docusate (PERICOLACE) 8.6-50 MG per tablet 1 tablet  1 tablet Oral BID Raquel Duncan APRN   1 tablet at 24    And    polyethylene glycol (MIRALAX) packet 17 g  17 g Oral Daily PRN Raquel Duncan APRN        And    bisacodyl (DULCOLAX) EC tablet 5 mg "  5 mg Oral Daily PRN Raquel Duncan, SUSAN        And    bisacodyl (DULCOLAX) suppository 10 mg  10 mg Rectal Daily PRN Raquel Duncan, SUSAN        bumetanide (BUMEX) tablet 1 mg  1 mg Oral Daily Raquel Duncan, APRN   1 mg at 03/26/24 2057    carvedilol (COREG) tablet 6.25 mg  6.25 mg Oral BID Raquel Duncan APRN   6.25 mg at 03/26/24 2057    dextrose (D50W) (25 g/50 mL) IV injection 25 g  25 g Intravenous Q15 Min PRN Raquel Duncan, SUSAN        dextrose (GLUTOSE) oral gel 15 g  15 g Oral Q15 Min PRN Raquel Duncan, SUSAN        donepezil (ARICEPT) tablet 10 mg  10 mg Oral Daily Raquel Duncan APRN   10 mg at 03/27/24 1002    glucagon (GLUCAGEN) injection 1 mg  1 mg Intramuscular Q15 Min PRN Raquel Duncan APRN        haloperidol lactate (HALDOL) injection 5 mg  5 mg Intravenous Q6H PRN Rene Maloney MD        insulin detemir (LEVEMIR) injection 30 Units  30 Units Subcutaneous Nightly Raquel Duncan APRN   30 Units at 03/26/24 2213    Insulin Lispro (humaLOG) injection 4-24 Units  4-24 Units Subcutaneous 4x Daily AC & at Bedtime Raquel Duncan APRN   4 Units at 03/27/24 0908    insulin regular (humuLIN R,novoLIN R) injection 10 Units  10 Units Subcutaneous TID AC Steve De Jesus MD   10 Units at 03/27/24 1151    Linezolid (ZYVOX) 600 mg 300 mL  600 mg Intravenous Q12H Raquel Duncan APRN 300 mL/hr at 03/27/24 1151 600 mg at 03/27/24 1151    LORazepam (ATIVAN) injection 1 mg  1 mg Intravenous Q4H PRN Rene Maloney MD   1 mg at 03/27/24 1150    melatonin tablet 6 mg  6 mg Oral Nightly Rene Maloney MD        meropenem (MERREM) 1,000 mg in sodium chloride 0.9 % 100 mL MBP  1,000 mg Intravenous Q8H Raquel Duncan APRN   1,000 mg at 03/27/24 0911    midodrine (PROAMATINE) tablet 10 mg  10 mg Oral TID AC Raquel Duncan APRN        nitroglycerin (NITROSTAT) SL tablet 0.4 mg  0.4 mg Sublingual Q5 Min PRN Raquel Duncan APRN        ondansetron ODT  (ZOFRAN-ODT) disintegrating tablet 4 mg  4 mg Oral Q6H PRN Raquel Duncan APRN        Or    ondansetron (ZOFRAN) injection 4 mg  4 mg Intravenous Q6H PRN Raquel Duncan APRN        oxyCODONE (ROXICODONE) immediate release tablet 10 mg  10 mg Oral BID PRN Raquel Duncan APRN        pantoprazole (PROTONIX) EC tablet 40 mg  40 mg Oral BID Raquel Duncan, APRN   40 mg at 24 2104    sodium chloride 0.9 % flush 10 mL  10 mL Intravenous PRN Steve De Jesus MD        sodium chloride 0.9 % flush 10 mL  10 mL Intravenous Q12H Raquel Duncan APRN   10 mL at 24 0955    sodium chloride 0.9 % flush 10 mL  10 mL Intravenous PRN Raquel Duncan APRN        sodium chloride 0.9 % infusion 40 mL  40 mL Intravenous PRN Raquel Duncan APRN        sodium hypochlorite (DAKIN'S 1/4 STRENGTH) 0.125 % topical solution 0.125% solution   Topical BID Raquel Duncan APRN   Given at 24 1001     Review of Systems   Unable to perform ROS: Mental status change       OBJECTIVE     Vitals:    24 1501   BP: 132/99   Pulse: 90   Resp: 18   Temp: 98.3 °F (36.8 °C)   SpO2: 95%       PHYSICAL EXAM  GEN:   Pt presents in hospital bed. Accompanied by none.     Foot/Ankle Exam    GENERAL  Appearance:  appears ill  Orientation:  unable to assess    VASCULAR     Right Foot Vascularity   Dorsalis pedis:  2+  Posterior tibial:  2+  Skin temperature:  cool  Edema grading:  Trace  CFT:  3  Pedal hair growth:  Absent  Varicosities:  none     Left Foot Vascularity   Dorsalis pedis:  Doppler  Posterior tibial:  Doppler  Skin temperature:  cool  Edema gradin+ and pitting  CFT:  3  Pedal hair growth:  Absent  Varicosities:  none     NEUROLOGIC     Right Foot Neurologic   Right ankle sensation: Unable to assess d/t altered mental status.  Right lateral antebrachial cutaneous nerve sensation: Unable to assess d/t altered mental status.  Hot/Cold sensation: (Unable to assess d/t altered mental status)      Left Foot Neurologic   Left ankle sensation: Unable to assess d/t altered mental status.  Left lateral antebrachial cutaneous nerve sensation: Unable to assess d/t altered mental status.  Hot/Cold sensation:  (Unable to assess d/t altered mental status)    MUSCULOSKELETAL     Right Foot Musculoskeletal   Ecchymosis:  none  Tenderness:  none       Left Foot Musculoskeletal    Amputation   Toes amputated: fifth toe  Tenderness:  (Patient grimacing upon light touch/manipulation of foot.)    RANGE OF MOTION     Right Foot Range of Motion   Foot and ankle ROM within normal limits       Left Foot Range of Motion   Foot and ankle ROM within normal limits      DERMATOLOGIC      Right Foot Dermatologic   Skin  Positive for skin changes and wound.      Left Foot Dermatologic   Skin  Positive for erythema, skin changes and wound.      Left foot additional comments:             Image:                  RADIOLOGY/NUCLEAR:  XR Foot 3+ View Left    Result Date: 3/26/2024  Narrative: EXAMINATION: XR FOOT 3+ VW LEFT-  3/26/2024 4:19 PM  HISTORY: foot infection  3 view LEFT foot exam.  Previous amputation of the mid/distal fifth metatarsal.  Interval resection or resorption of the fourth metatarsal head.  No bone destruction or soft tissue air is seen.  High-grade degenerative disease at the first metatarsal phalangeal joint.  Unchanged appearance of prominent dorsal midfoot spurring and prominent inferior calcaneal spurring.  Mild soft tissue edema over the dorsum of the foot is less prominent as compared with the previous exam.      Impression: 1. No bone destruction or soft tissue air is seen. 2. Prominent degenerative spurring.    This report was signed and finalized on 3/26/2024 5:25 PM by Dr. Gomez Mcgill MD.      XR Chest 1 View    Result Date: 3/26/2024  Narrative: EXAM: XR CHEST 1 VW-  DATE: 3/26/2024 1:15 PM  HISTORY: ams   COMPARISON: 8/28/2023.  TECHNIQUE:  Frontal view(s) of the chest submitted.  FINDINGS:  Patient is  status post median sternotomy and CABG. There is enlargement of the cardiac silhouette. There are low lung volumes with vascular crowding versus mild volume overload. No effusion or pneumothorax is seen.       Impression:  1. Postoperative chest and low lung volumes with vascular crowding versus mild volume overload.  This report was signed and finalized on 3/26/2024 2:34 PM by Eran Christina.      CT Head Without Contrast    Result Date: 3/26/2024  Narrative: EXAM: CT HEAD WO CONTRAST-  DATE: 3/26/2024 1:03 PM  HISTORY: ams   COMPARISON: 10/10/2023.  DOSE LENGTH PRODUCT: 876.8 mGy.cm  Automated exposure control was also utilized to decrease patient radiation dose.  TECHNIQUE: Unenhanced CT images obtained from vertex to skull base with multiplanar reformats.  FINDINGS: There is no acute intracranial hemorrhage, midline shift, mass effect, or hydrocephalus. There is no CT evidence for acute infarct.  There are chronic changes with volume loss and chronic small vessel ischemic change of the periventricular white matter.  SOFT TISSUES: The scalp soft tissues are unremarkable.   SINUS: There is partially imaged mucosal thickening at the right maxillary sinus.  ORBITS: The visualized orbits and globes are unremarkable. There is bilateral lens extraction.       Impression: 1. Chronic changes and no acute intracranial findings.  This report was signed and finalized on 3/26/2024 2:10 PM by Eran Christina.       LABORATORY/CULTURE RESULTS:  Results from last 7 days   Lab Units 03/27/24  0449 03/26/24  1326   WBC 10*3/mm3 12.75* 14.69*   HEMOGLOBIN g/dL 11.8* 11.3*   HEMATOCRIT % 35.9* 33.8*   PLATELETS 10*3/mm3 163 171     Results from last 7 days   Lab Units 03/27/24  0449 03/26/24  1326   SODIUM mmol/L 142 140   POTASSIUM mmol/L 3.5 3.4*   CHLORIDE mmol/L 103 103   CO2 mmol/L 25.0 23.0   BUN mg/dL 29* 29*   CREATININE mg/dL 2.07* 1.93*   CALCIUM mg/dL 8.9 9.5   BILIRUBIN mg/dL  --  0.7   ALK PHOS U/L  --  91   ALT  (SGPT) U/L  --  8   AST (SGOT) U/L  --  13   GLUCOSE mg/dL 169* 438*     Results from last 7 days   Lab Units 03/26/24  1326   INR  1.07     Microbiology Results (last 10 days)       Procedure Component Value - Date/Time    Respiratory Panel PCR w/COVID-19(SARS-CoV-2) [facility] In-House, NP Swab in UTM/VTM, 2 HR TAT - Swab, Nasopharynx [[id number]]  (Normal) Collected: 03/26/24 1732    Lab Status: Final result Specimen: Swab from Nasopharynx Updated: 03/26/24 1905     ADENOVIRUS, PCR Not Detected     Coronavirus 229E Not Detected     Coronavirus HKU1 Not Detected     Coronavirus NL63 Not Detected     Coronavirus OC43 Not Detected     COVID19 Not Detected     Human Metapneumovirus Not Detected     Human Rhinovirus/Enterovirus Not Detected     Influenza A PCR Not Detected     Influenza B PCR Not Detected     Parainfluenza Virus 1 Not Detected     Parainfluenza Virus 2 Not Detected     Parainfluenza Virus 3 Not Detected     Parainfluenza Virus 4 Not Detected     RSV, PCR Not Detected     Bordetella pertussis pcr Not Detected     Bordetella parapertussis PCR Not Detected     Chlamydophila pneumoniae PCR Not Detected     Mycoplasma pneumo by PCR Not Detected    Narrative:      In the setting of a positive respiratory panel with a viral infection PLUS a negative procalcitonin without other underlying concern for bacterial infection, consider observing off antibiotics or discontinuation of antibiotics and continue supportive care. If the respiratory panel is positive for atypical bacterial infection (Bordetella pertussis, Chlamydophila pneumoniae, or Mycoplasma pneumoniae), consider antibiotic de-escalation to target atypical bacterial infection.    Blood Culture - Blood, Arm, Right [[id number]]  (Abnormal) Collected: 03/26/24 1346    Lab Status: Preliminary result Specimen: Blood from Arm, Right Updated: 03/27/24 0356     Blood Culture Abnormal Stain     Gram Stain Aerobic Bottle Gram positive cocci       Anaerobic Bottle Gram positive cocci    Blood Culture - Blood, Arm, Left [[id number]]  (Abnormal) Collected: 03/26/24 1346    Lab Status: Preliminary result Specimen: Blood from Arm, Left Updated: 03/27/24 0355     Blood Culture Abnormal Stain     Gram Stain Aerobic Bottle Gram positive cocci      Anaerobic Bottle Gram positive cocci    Blood Culture ID, PCR - Blood, Arm, Left [[id number]]  (Abnormal) Collected: 03/26/24 1346    Lab Status: Final result Specimen: Blood from Arm, Left Updated: 03/27/24 0426     BCID, PCR Staph aureus. mecA/C and MREJ (methicillin resistance gene) detected. Identification by BCID2 PCR.     BOTTLE TYPE Aerobic Bottle    Narrative:      Infectious disease consultation is highly recommended to rule out distant foci of infection.            PATHOLOGY RESULTS:       ASSESSMENT/PLAN   Sepsis  Metabolic Encephalopathy Secondary to Sepsis  Multiple Diabetic Foot Ulcerations to Left Foot  Type II DM with Hyperglycemia, With Long-Term Current Use of Insulin.  Diabetic Polyneuropathy    Review of labs, imaging, and other provider documentation  Comprehensive lower extremity examination and evaluation was performed.    Awaiting MRI of left foot to be performed for further evaluation of wounds- if there is no Osteomyelitis or abscess present, we can continue antibiotics and localized wound care.  New dressing applied to left foot today.   Wound care order placed for Betadine soaked gauze wet to dry to be changed twice daily.     Continue antibiotic therapy per Hospitalist.     Thank you kindly for the consult, we will continue to follow the patient while in house.     This document has been electronically signed by SUSAN Luna on March 27, 2024 16:31 CDT

## 2024-03-27 NOTE — PLAN OF CARE
Goal Outcome Evaluation:  Plan of Care Reviewed With: patient        Progress: no change  Outcome Evaluation: pt drowsy, able to swallow liquid tylenol for fever, unable to take other po meds. iv abx infused. restless this shift, multiple attempts to pull smith catheter out, pulled iv out. vats consulted for new iv. restraint order obtained and restraints applied by two rns. fever has come down. vss. bed alarm set. safety maintained.

## 2024-03-27 NOTE — PAYOR COMM NOTE
"Erick Luong (68 y.o. Male)    [id number]    admit 3/26   [facility] phone    Fax          Date of Birth   1955    Social Security Number       Address   [address] [place] [address]    Home Phone   [phone]    MRN   [id number]       Anglican   [facility]    Marital Status                               Admission Date   3/26/24    Admission Type   Emergency    Admitting Provider   Rene Maloney MD    Attending Provider   Rene Maloney MD    Department, Room/Bed   [facility] 3C, 372/1       Discharge Date       Discharge Disposition       Discharge Destination                                 Attending Provider: Rene Maloney MD    Allergies: Cefepime, Bactrim [Sulfamethoxazole-trimethoprim], Vancomycin, Zolpidem, Zolpidem Tartrate, Metronidazole    Isolation: Contact   Infection: MRSA (05/19/19), COVID (History) (08/08/22), Indira Auris (rule out) (03/27/24)   Code Status: CPR    Ht: 182.9 cm (72\")   Wt: 132 kg (290 lb 5.5 oz)    Admission Cmt: None   Principal Problem: Sepsis [A41.9]                   Active Insurance as of 3/26/2024       Primary Coverage       Payor Plan Insurance Group Employer/Plan Group    [employer] EM[employer] [id number]       Payor Plan Address Payor Plan Phone Number Payor Plan Fax Number Effective Dates    [address] [phone]  1/1/2022 - None Entered    [facility] [id number]         Subscriber Name Subscriber Birth Date Member ID       ZAINAB LUONG 11/27/1969 [id number]               Secondary Coverage       Payor Plan Insurance Group Employer/Plan Group    MEDICARE MEDICARE A & B        Payor Plan Address Payor Plan Phone Number Payor Plan Fax Number Effective Dates    [address] [phone]  7/1/2013 - None Entered    [facility] [id number]         Subscriber Name Subscriber Birth Date Member ID       ERICK LUONG 1955 [id number]               "       Emergency Contacts        (Rel.) Home Phone Work Phone Mobile Phone    Joan Luong (Spouse) [phone]                 History & Physical        Raquel Duncan APRN at 03/26/24 1721       Attestation signed by Rene Maloney MD at 03/27/24 8286    I personally evaluated and examined the patient face to face in conjunction with the APC and agree with the management and disposition of the patient. My key findings are:     History: Patient presented with altered mental status and generalized weakness.  He had recent debridement of a left foot ulcer.    PHYSICAL EXAM  Constitutional: He is alert but confused.  Not oriented. He appears acutely ill, well-developed and well-nourished.   HENT:   Head: Normocephalic and atraumatic.   Cardiovascular: Normal rate and regular rhythm.   Pulmonary/Chest: Effort normal and breath sounds normal. No stridor. No respiratory distress.   Abdominal: Soft. Bowel sounds are normal. He exhibits no distension. There is no tenderness.   Neurological: He is alert and confused.  Not oriented.   Musculoskeletal: Left foot ulcer as noted.  Surrounding erythema warmth  Psychiatric: He has a normal mood and affect     Assessment  Sepsis  Metabolic encephalopathy secondary to sepsis  Left infected diabetic foot ulcer    Plan  Admit to my service.  Start IV antibiotics meropenem and vancomycin.  Follow-up blood cultures.  Will have podiatry for evaluation in the morning and possible MRI of the foot to assess for osteomyelitis.                      [facility]  HISTORY AND PHYSICAL    Date of Admission: 3/26/2024  Primary Care Physician: Del Shetty MD    Subjective   Primary Historian: Patient's wife Joan    Chief Complaint: Altered mental status    History of Present Illness  Erick Luong is a 68-year-old male with a past medical history of coronary artery disease, diastolic  dysfunction followed by Dr. Garay, vascular disease, GERD, diabetes, chronic anemia, chronic nonhealing wound to the left foot followed by [facility].  Patient had an extended hospitalization 8/2023 due to syncope, subsequent admission to [facility] 9/18 - 10/11, 2023.  During that admission he did undergo debridement of Proteus wound infection.    Patient seen by PCP on 3/11/2024, started on prazosin 1 mg daily, duloxetine 60 mg daily magnesium hydroxide 400 mg daily per office note.  Wife is unaware of exactly what patient is taking.  Will need pharmacy to obtain correct med list.    Wife states they were seen by Dylan Pearson, podiatry yesterday who performed debridement on the plantar wound treating with Santyl, is also a area on dorsal aspect of the foot.  Foot is erythemic, warm to touch.  Doctor told patient and wife he was doing well.  There was no signs of infection yesterday.  Today patient was brought to emergency department via EMS for altered mental status with disorientation, glucose was noted to be over 500 in the ambulance.  Initial glucose here 400 followed with 438 treated with regular insulin.  I have taken care of this patient many times.  He awakens for questions.  He is somewhat somnolent.  Remainder workup reveals creatinine 1.9 at baseline, CRP 4.3, lactic acid 3.2 and procalcitonin 6.56.  He does have a white count of 14.6, no bandemia.  Initially afebrile however now 100.7, patient meets criteria for sepsis with tachycardia and tachypnea, elevated lactic acid and source of infection.  There is no organ failure.  Patient is admitted for simple sepsis, condition stable.      Review of Systems   Otherwise complete ROS reviewed and negative except as mentioned in the HPI.    Past Medical History:   Past Medical History:   Diagnosis Date    Arthritis     Autonomic disease     CAD (coronary artery disease) 02/06/2017    Cervical radiculopathy 09/16/2021    Chronic constipation with  acute exaccerbation 05/10/2021    Coronary artery disease     Degeneration of cervical intervertebral disc 08/11/2021    Diabetes mellitus     Diabetic foot ulcer 08/31/2020    Diabetic polyneuropathy associated with type 2 diabetes mellitus 01/18/2021    Elevated cholesterol     Gastroesophageal reflux disease 05/13/2019    Headache     HTN (hypertension), benign 05/03/2017    Hyperlipidemia     Hypertension     Mixed hyperlipidemia 02/07/2017    Multiple lung nodules 01/26/2020    5mm, 9 mm RLL identified 1/2020, not present 10/2019.    Myocardial infarction     Osteomyelitis 01/22/2020    Osteomyelitis of fifth toe of right foot 10/07/2019    Pancreatitis     Persistent insomnia 01/20/2020    Renal disorder     Sleep apnea 02/06/2017    Sleep apnea with use of continuous positive airway pressure (CPAP)     NON-COMPLIANT    Slow transit constipation 01/16/2019    Spinal stenosis in cervical region 09/16/2021    Vitamin D deficiency 03/02/2021     Past Surgical History:  Past Surgical History:   Procedure Laterality Date    ABDOMINAL SURGERY      AMPUTATION FOOT / TOE Left 10/2021    5th digit     ANTERIOR CERVICAL DISCECTOMY W/ FUSION N/A 8/5/2022    Procedure: CERVICAL DISCECTOMY ANTERIOR WITH FUSION C5-6 with possible plating of C5-7 with neuromonitoring and 1 c-arm;  Surgeon: Karel Soliz MD;  Location:  [facility] OR;  Service: Neurosurgery;  Laterality: N/A;    APPENDECTOMY      BACK SURGERY      CARDIAC CATHETERIZATION Left 02/08/2021    Procedure: Left Heart Cath w poss intervention left anatomical snuff box acess;  Surgeon: Omkar Charles DO;  Location:  [facility] CATH INVASIVE LOCATION;  Service: Cardiology;  Laterality: Left;    CARDIAC SURGERY      CATARACT EXTRACTION      CERVICAL SPINE SURGERY      COLONOSCOPY N/A 01/31/2017    Normal exam repeat in 5 years    COLONOSCOPY N/A 02/11/2019    Mild acute inflammation    COLONOSCOPY W/ POLYPECTOMY  03/04/2014    Hyperplastic polyp    CORONARY  "ARTERY BYPASS GRAFT  10/2015    ENDOSCOPY  04/13/2011    Gastritis with hemorrhage    ENDOSCOPY N/A 05/05/2017    Normal exam    ENDOSCOPY N/A 02/11/2019    Gastritis    ENDOSCOPY N/A 09/01/2020    Non-erosive gastritis with hemorrhage    ENDOSCOPY N/A 02/10/2021    Esophagitis    FOOT SURGERY Left     INCISION AND DRAINAGE OF WOUND Left 09/2007    spider bite     Social History:  reports that he quit smoking about 33 years ago. His smoking use included cigarettes. He has never used smokeless tobacco. He reports that he does not drink alcohol and does not use drugs.    Family History: family history includes Alzheimer's disease in his mother; Colon cancer in his father and sister; Colon polyps in his sister; Coronary artery disease in his sister and sister; Heart disease in his father.       Allergies:  Allergies   Allergen Reactions    Cefepime Hives and Anaphylaxis    Bactrim [Sulfamethoxazole-Trimethoprim] Other (See Comments)     \"RENAL FAILURE\"    Vancomycin Itching    Zolpidem Unknown - High Severity     \"makes him crazy\"    Zolpidem Tartrate Unknown - Low Severity and Provider Review Needed    Metronidazole Rash       Medications:  Prior to Admission medications    Medication Sig Start Date End Date Taking? Authorizing Provider   amitriptyline (ELAVIL) 25 MG tablet/ Take 2 tablets by mouth Daily at bedtime. 2/16/23      amoxicillin-clavulanate (Augmentin) 500-125 MG per tablet Take 1 tablet by mouth every 12 hours with meals for 7 days. 9/5/23      ascorbic acid (VITAMIN C) 1000 MG tablet/ Take 1 tablet by mouth Daily. 8/25/23      Aspirin 81 MG capsule/ Take 81 mg by mouth Daily.    Provider, MD Bossman   bumetanide (BUMEX) 1 MG tablet Take 1 tablet every day by oral route for 30 days. 3/26/24 4/25/24     bumetanide (BUMEX) 2 MG tablet/ Take 2 tablets in morning;1 tablet at night 8/23/23   Lexie Houston APRN   busPIRone (BUSPAR) 10 MG tablet/ Take 1 tablet by mouth 2 (Two) Times a Day As Needed. 6/21/23   " Payam Keyes DO   calcitriol (ROCALTROL) 0.5 MCG capsule/ Take 1 capsule every day by oral route for 90 days. 3/26/24 6/24/24     carvedilol (COREG) 6.25 MG tablet/ Take 1 tablet by mouth 2 (Two) Times a Day. 12/22/22      Cholecalciferol (D-5000) 125 MCG (5000 UT) tablet// Take 1 tablet by mouth Daily Before Lunch. 8/25/23      cloNIDine (CATAPRES) 0.1 MG tablet Take 1 tablet by mouth Every 12 (Twelve) Hours. 11/16/23      Diclofenac Sodium (VOLTAREN) 1 % gel gel Apply 2 g topically to the appropriate area as directed 4 (Four) Times a Day As Needed. 6/1/23      Diclofenac Sodium (VOLTAREN) 1 % gel gel Apply 2 g topically to the appropriate area as directed 4 (Four) Times a Day. 3/26/24      donepezil (ARICEPT) 10 MG tablet Take 1 tablet by mouth Daily. 7/20/22      Dulaglutide (Trulicity) 4.5 MG/0.5ML solution pen-injector Inject 0.5 mL under the skin into the appropriate area as directed 1 (One) Time Per Week. 7/24/23      DULoxetine (CYMBALTA) 60 MG capsule Take 1 capsule by mouth Daily. 3/11/24      DULoxetine (CYMBALTA) 60 MG capsule Take 1 capsule by mouth Daily. 3/15/24      DULoxetine (CYMBALTA) 60 MG capsule Take 1 capsule by mouth Daily. 3/26/24      empagliflozin (JARDIANCE) 25 MG tablet tablet/ Take 1 tablet by mouth Daily. 6/22/23   Payam Keyes DO   famotidine (PEPCID) 20 MG tablet Take 1 tablet twice a day by oral route. 3/26/24      fluticasone (FLONASE) 50 MCG/ACT nasal spray 1 spray into the nostril(s) as directed by provider Daily. 3/26/24      HYDROcodone-acetaminophen (NORCO) 7.5-325 MG per tablet/ Take 1 tablet by mouth 2 (Two) Times a Day As Needed. 9/5/23      Insulin Glargine (Lantus SoloStar) 100 UNIT/ML injection pen Inject 20 Units under the skin into the appropriate area as directed Every Evening. 3/11/24      Insulin Regular Human, Conc, (HumuLIN R U-500 KwikPen) 500 UNIT/ML solution pen-injector CONCENTRATED injection Inject 120 Units under the skin into the appropriate  area as directed 2 (Two) Times a Day with breakfast and dinner.  Patient taking differently: Inject 120 Units under the skin into the appropriate area as directed 3 (Three) Times a Day Before Meals. 7/10/23      Insulin Regular Human, Conc, (HumuLIN R U-500 KwikPen) 500 UNIT/ML solution pen-injector CONCENTRATED injection Inject 40 Units under the skin into the appropriate area with regular meals AND 40 Units with large meals. 3/4/24      magnesium hydroxide (Milk of Magnesia) 400 MG/5ML suspension/ Take 30mL by mouth once daily for 30 days. 3/26/24 4/25/24     methylcellulose (Citrucel) oral powder Mix 5g as directed and drink twice daily for 90 days. 3/26/24 6/24/24     midodrine (PROAMATINE) 10 MG tablet/ Take 1 tablet by mouth 3 (Three) Times a Day. 9/10/23   Brian Lomas MD   nitroglycerin (NITROSTAT) 0.4 MG SL tablet Dissolve 1 tablet by mouth every 5 minutes as needed for chest pain; MAX 3 tabs/24 hours.  If no improvement after 3 tabs go to ER 3/11/24      ondansetron ODT (ZOFRAN-ODT) 8 MG disintegrating tablet Place 1 tablet under tongue 3 times a day as needed. 8/16/23      oxyCODONE (ROXICODONE) 10 MG tablet Take 1 tablet by mouth twice a day as needed 3/15/24      pantoprazole (Protonix) 40 MG EC tablet/ Take 1 tablet by mouth 2 (Two) Times a Day. 11/8/22      polyethylene glycol (MIRALAX) 17 g packet/ Take 17 g by mouth Daily. Obtain OTC 8/23/23   Lexie Houston APRN   prazosin (MINIPRESS) 1 MG capsule Take 1 capsule by mouth every night at bedtime. 3/11/24      pregabalin (LYRICA) 100 MG capsule/ Take 1 capsule by mouth Daily With Breakfast AND 2 capsules Every Night. 7/25/23      rosuvastatin (CRESTOR) 40 MG tablet/ Take 1 tablet by mouth Every Night. 3/18/22      sennosides-docusate (PERICOLACE) 8.6-50 MG per tablet/ Take 1 tablet by mouth every night at bedtime. 8/25/23      sodium hypochlorite (DAKIN'S 1/4 STRENGTH) 0.125 % solution topical solution 0.125% Apply to affected area twice daily as  "directed 3/25/24      tamsulosin (FLOMAX) 0.4 MG capsule 24 hr capsule/ Take 1 capsule by mouth Daily. 8/7/23      spironolactone (ALDACTONE) 25 MG tablet Take 1 tablet by mouth Daily. 9/28/20 12/31/20  Del Shetty MD     I have utilized all available immediate resources to obtain, update, or review the patient's current medications (including all prescriptions, over-the-counter products, herbals, cannabis/cannabidiol products, and vitamin/mineral/dietary (nutritional) supplements).    Objective     Vital Signs: /54 (BP Location: Right arm, Patient Position: Lying)   Pulse 107   Temp (!) 102.3 °F (39.1 °C) (Oral) Comment: nurse noted  Resp 20   Ht 182.9 cm (72\")   Wt 130 kg (286 lb 9.6 oz)   SpO2 98%   BMI 38.87 kg/m²   Physical Exam  Vitals reviewed.   Constitutional:       Appearance: He is obese. He is ill-appearing.   HENT:      Head: Normocephalic and atraumatic.      Mouth/Throat:      Mouth: Mucous membranes are moist.      Pharynx: Oropharynx is clear.   Eyes:      Extraocular Movements: Extraocular movements intact.      Conjunctiva/sclera: Conjunctivae normal.   Cardiovascular:      Rate and Rhythm: Tachycardia present. Rhythm irregular.      Comments: Atrial fibrillation  Pulmonary:      Effort: Pulmonary effort is normal.      Breath sounds: Normal breath sounds.   Abdominal:      General: Abdomen is protuberant.      Palpations: Abdomen is soft.   Musculoskeletal:      Cervical back: Normal range of motion and neck supple.      Right lower leg: No edema.      Left lower leg: No edema.        Feet:       Comments: Generalized weakness and debility.  Amputation fifth digit on left foot   Skin:     Findings: Erythema present.   Neurological:      Comments: Obtunded   Psychiatric:         Mood and Affect: Affect is flat.         Behavior: Behavior is cooperative.        Results Reviewed:  Lab Results (last 24 hours)       Procedure Component Value Units Date/Time    Sedimentation Rate " [[id number]]  (Abnormal) Collected: 03/26/24 1326    Specimen: Blood Updated: 03/26/24 1713     Sed Rate 39 mm/hr     STAT Lactic Acid, Reflex [[id number]]  (Abnormal) Collected: 03/26/24 1624    Specimen: Blood from Arm, Left Updated: 03/26/24 1658     Lactate 3.4 mmol/L     Urinalysis With Culture If Indicated - Urine, Catheter [[id number]]  (Abnormal) Collected: 03/26/24 1434    Specimen: Urine, Catheter Updated: 03/26/24 1500     Color, UA Yellow     Appearance, UA Clear     pH, UA 5.5     Specific Gravity, UA 1.020     Glucose,  mg/dL (2+)     Ketones, UA Trace     Bilirubin, UA Negative     Blood, UA Small (1+)     Protein, UA >=300 mg/dL (3+)     Leuk Esterase, UA Negative     Nitrite, UA Negative     Urobilinogen, UA 0.2 E.U./dL    Urinalysis, Microscopic Only - Urine, Catheter [[id number]]  (Abnormal) Collected: 03/26/24 1434    Specimen: Urine, Catheter Updated: 03/26/24 1500     RBC, UA 0-2 /HPF      WBC, UA 0-2 /HPF      Comment: Urine culture not indicated.        Bacteria, UA Trace /HPF      Squamous Epithelial Cells, UA 3-6 /HPF      Hyaline Casts, UA 0-2 /LPF      Methodology Manual Light Microscopy    Comprehensive Metabolic Panel [[id number]]  (Abnormal) Collected: 03/26/24 1326    Specimen: Blood Updated: 03/26/24 1418     Glucose 438 mg/dL      BUN 29 mg/dL      Creatinine 1.93 mg/dL      Sodium 140 mmol/L      Potassium 3.4 mmol/L      Chloride 103 mmol/L      CO2 23.0 mmol/L      Calcium 9.5 mg/dL      Total Protein 6.5 g/dL      Albumin 3.7 g/dL      ALT (SGPT) 8 U/L      AST (SGOT) 13 U/L      Alkaline Phosphatase 91 U/L      Total Bilirubin 0.7 mg/dL      Globulin 2.8 gm/dL      A/G Ratio 1.3 g/dL      BUN/Creatinine Ratio 15.0     Anion Gap 14.0 mmol/L      eGFR 37.5 mL/min/1.73     Magnesium [[id number]]  (Normal) Collected: 03/26/24 1326    Specimen: Blood Updated: 03/26/24 1418     Magnesium 1.7 mg/dL     Lactic Acid, Plasma [[id number]]  (Abnormal) Collected: 03/26/24 1326    Specimen:  Blood Updated: 03/26/24 1417     Lactate 3.6 mmol/L     Blood Culture - Blood, Arm, Left [[id number]] Collected: 03/26/24 1346    Specimen: Blood from Arm, Left Updated: 03/26/24 1409    Blood Culture - Blood, Arm, Right [[id number]] Collected: 03/26/24 1346    Specimen: Blood from Arm, Right Updated: 03/26/24 1408    Procalcitonin [[id number]]  (Abnormal) Collected: 03/26/24 1326    Specimen: Blood Updated: 03/26/24 1402     Procalcitonin 6.56 ng/mL     Protime-INR [[id number]]  (Normal) Collected: 03/26/24 1326    Specimen: Blood Updated: 03/26/24 1345     Protime 14.3 Seconds      INR 1.07    CBC Auto Differential [[id number]]  (Abnormal) Collected: 03/26/24 1326    Specimen: Blood Updated: 03/26/24 1336     WBC 14.69 10*3/mm3      RBC 3.90 10*6/mm3      Hemoglobin 11.3 g/dL      Hematocrit 33.8 %      MCV 86.7 fL      MCH 29.0 pg      MCHC 33.4 g/dL      RDW 14.4 %      RDW-SD 44.8 fl      MPV 12.4 fL      Platelets 171 10*3/mm3      Neutrophil % 89.1 %      Lymphocyte % 1.7 %      Monocyte % 8.1 %      Eosinophil % 0.1 %      Basophil % 0.2 %      Immature Grans % 0.8 %      Neutrophils, Absolute 13.09 10*3/mm3      Lymphocytes, Absolute 0.25 10*3/mm3      Monocytes, Absolute 1.19 10*3/mm3      Eosinophils, Absolute 0.01 10*3/mm3      Basophils, Absolute 0.03 10*3/mm3      Immature Grans, Absolute 0.12 10*3/mm3      nRBC 0.0 /100 WBC     POC Glucose Once [[id number]]  (Abnormal) Collected: 03/26/24 1315    Specimen: Blood Updated: 03/26/24 1326     Glucose 400 mg/dL      Comment: : [id number] Karel NisaYousifeter ID: [id number]             Imaging Results (Last 24 Hours)       Procedure Component Value Units Date/Time    XR Foot 3+ View Left [[id number]] Collected: 03/26/24 1723     Updated: 03/26/24 1728    Narrative:      EXAMINATION: XR FOOT 3+ VW LEFT-     3/26/2024 4:19 PM     HISTORY: foot infection     3 view LEFT foot exam.     Previous amputation of the mid/distal fifth metatarsal.     Interval resection or  resorption of the fourth metatarsal head.     No bone destruction or soft tissue air is seen.     High-grade degenerative disease at the first metatarsal phalangeal  joint.     Unchanged appearance of prominent dorsal midfoot spurring and prominent  inferior calcaneal spurring.     Mild soft tissue edema over the dorsum of the foot is less prominent as  compared with the previous exam.       Impression:      1. No bone destruction or soft tissue air is seen.  2. Prominent degenerative spurring.           This report was signed and finalized on 3/26/2024 5:25 PM by Dr. Gomez Mcgill MD.       XR Chest 1 View [[id number]] Collected: 03/26/24 1433     Updated: 03/26/24 1437    Narrative:      EXAM: XR CHEST 1 VW-      DATE: 3/26/2024 1:15 PM     HISTORY: ams       COMPARISON: 8/28/2023.     TECHNIQUE:  Frontal view(s) of the chest submitted.     FINDINGS:    Patient is status post median sternotomy and CABG. There is enlargement  of the cardiac silhouette. There are low lung volumes with vascular  crowding versus mild volume overload. No effusion or pneumothorax is  seen.          Impression:         1. Postoperative chest and low lung volumes with vascular crowding  versus mild volume overload.     This report was signed and finalized on 3/26/2024 2:34 PM by Eran Christina.       CT Head Without Contrast [[id number]] Collected: 03/26/24 1408     Updated: 03/26/24 1413    Narrative:      EXAM: CT HEAD WO CONTRAST-      DATE: 3/26/2024 1:03 PM     HISTORY: ams       COMPARISON: 10/10/2023.     DOSE LENGTH PRODUCT: 876.8 mGy.cm  Automated exposure control was also  utilized to decrease patient radiation dose.     TECHNIQUE: Unenhanced CT images obtained from vertex to skull base with  multiplanar reformats.     FINDINGS:  There is no acute intracranial hemorrhage, midline shift, mass effect,  or hydrocephalus. There is no CT evidence for acute infarct.     There are chronic changes with volume loss and chronic small  vessel  ischemic change of the periventricular white matter.      SOFT TISSUES: The scalp soft tissues are unremarkable.        SINUS: There is partially imaged mucosal thickening at the right  maxillary sinus.     ORBITS: The visualized orbits and globes are unremarkable. There is  bilateral lens extraction.          Impression:      1. Chronic changes and no acute intracranial findings.      This report was signed and finalized on 3/26/2024 2:10 PM by Eran Christina.           8/22/2023 ECHO  Interpretation Summary     Technically difficult study.    Left ventricular systolic function is low normal. Left ventricular ejection fraction appears to be 51 - 55%.    Left ventricular diastolic dysfunction is noted.    Normal right ventricular cavity size and systolic function noted.    There is no significant (greater than mild) valvular dysfunction.    Compared to study from 1/21/2023, there are no significant changes.       Assessment / Plan   Assessment:   Active Hospital Problems    Diagnosis     **Sepsis     Diabetic foot infection     Chronic kidney disease (CKD), stage IV (severe)     Chronic diastolic heart failure     BPH without obstruction/lower urinary tract symptoms     Diabetic polyneuropathy associated with type 2 diabetes mellitus     Anemia due to chronic kidney disease     Essential hypertension     Type 2 diabetes mellitus with hyperglycemia, with long-term current use of insulin        Treatment Plan  1.  The patient will be admitted to Dr. Maloney's service here at [facility].   2.  Start Linezolid and meropenem  3.  Reviewed home medications as currently documented, continued those felt to be vital, will need in-depth med rec completed by pharmacy tech ASAP  5.  DVT prophylaxis with ongoing Eliquis use  6.  Monitor glucose before meals and at bedtime with regular insulin, high-dose, will start Levemir 30 units nightly  7.  Pain management, start bowel regimen  8.  Supplemental oxygen  as needed-use at night due to history of sleep apnea, incentive spirometry, continuous pulse oximetry  9.  Labs in a.m.  10.  Cardiac monitoring, daily weights, neurochecks, oral care, turn every 2 hours  11.  Consult -patient may require rehab again  12.  Consult podiatry  12.  Consult wound nurse, OT and PT    Medical Decision Making  Number and Complexity of problems: 9  Differential Diagnosis: None    Conditions and Status        Condition is unchanged.     [facility] Data  External documents reviewed: No  Cardiac tracing (EKG, telemetry) interpretation: Reviewed  Radiology interpretation: Reviewed  Labs reviewed: Yes  Any tests that were considered but not ordered: No     Decision rules/scores evaluated (example [id number], Wells, etc): No     Discussed with: Patient, wife and Dr. Maloney     Care Planning  Shared decision making: Patient, wife and Dr. Maloney  Code status and discussions: Full    Disposition  Social Determinants of Health that impact treatment or disposition: May need rehab  Estimated length of stay is 2+ days.     I confirmed that the patient's advanced care plan is present, code status is documented, and a surrogate decision maker is listed in the patient's medical record.     The patient's surrogate decision maker is wife Joan.     The patient was seen and examined by me on 3/26/2024 at 5:27 PM.    Electronically signed by SUSAN Ash, 03/26/24, 19:35 CDT.               Electronically signed by Rene Maloney MD at 03/27/24 0330          Emergency Department Notes        Ro Vinson, RN at 03/26/24 5109          Nursing report ED to floor  Erick R Cherise  68 y.o.  male    HPI:   Chief Complaint   Patient presents with    Altered Mental Status    Hyperglycemia       Admitting doctor:   Rene Maloney MD    Consulting provider(s):  Consults       No orders found from 2/26/2024 to 3/27/2024.             Admitting diagnosis:   The primary encounter diagnosis  was Diabetic foot infection. A diagnosis of Poorly controlled diabetes mellitus was also pertinent to this visit.    Code status:   Current Code Status       Date Active Code Status Order ID Comments User Context       Prior            Allergies:   Cefepime, Bactrim [sulfamethoxazole-trimethoprim], Vancomycin, Zolpidem, Zolpidem tartrate, and Metronidazole    Intake and Output  No intake or output data in the 24 hours ending 03/26/24 1739    Weight:       03/26/24  1313   Weight: 130 kg (286 lb 9.6 oz)       Most recent vitals:   Vitals:    03/26/24 1325 03/26/24 1346 03/26/24 1416 03/26/24 1446   BP:  144/71 132/71 131/69   BP Location:       Patient Position:       Pulse:  99 102 103   Resp:       Temp: 97.8 °F (36.6 °C)      TempSrc: Oral      SpO2:  97%  97%   Weight:       Height:         Oxygen Therapy: .    Active LDAs/IV Access:   Lines, Drains & Airways       Active LDAs       Name Placement date Placement time Site Days    Peripheral IV 03/26/24 1725 Left Antecubital 03/26/24  1725  Antecubital  less than 1                    Labs (abnormal labs have a star):   Labs Reviewed   COMPREHENSIVE METABOLIC PANEL - Abnormal; Notable for the following components:       Result Value    Glucose 438 (*)     BUN 29 (*)     Creatinine 1.93 (*)     Potassium 3.4 (*)     eGFR 37.5 (*)     All other components within normal limits    Narrative:     GFR Normal >60  Chronic Kidney Disease <60  Kidney Failure <15     LACTIC ACID, PLASMA - Abnormal; Notable for the following components:    Lactate 3.6 (*)     All other components within normal limits   CBC WITH AUTO DIFFERENTIAL - Abnormal; Notable for the following components:    WBC 14.69 (*)     RBC 3.90 (*)     Hemoglobin 11.3 (*)     Hematocrit 33.8 (*)     MPV 12.4 (*)     Neutrophil % 89.1 (*)     Lymphocyte % 1.7 (*)     Eosinophil % 0.1 (*)     Immature Grans % 0.8 (*)     Neutrophils, Absolute 13.09 (*)     Lymphocytes, Absolute 0.25 (*)     Monocytes, Absolute 1.19  "(*)     Immature Grans, Absolute 0.12 (*)     All other components within normal limits   URINALYSIS W/ CULTURE IF INDICATED - Abnormal; Notable for the following components:    Glucose,  mg/dL (2+) (*)     Ketones, UA Trace (*)     Blood, UA Small (1+) (*)     Protein, UA >=300 mg/dL (3+) (*)     All other components within normal limits    Narrative:     In absence of clinical symptoms, the presence of pyuria, bacteria, and/or nitrites on the urinalysis result does not correlate with infection.   PROCALCITONIN - Abnormal; Notable for the following components:    Procalcitonin 6.56 (*)     All other components within normal limits    Narrative:     As a Marker for Sepsis (Non-Neonates):    1. <0.5 ng/mL represents a low risk of severe sepsis and/or septic shock.  2. >2 ng/mL represents a high risk of severe sepsis and/or septic shock.    As a Marker for Lower Respiratory Tract Infections that require antibiotic therapy:    PCT on Admission    Antibiotic Therapy       6-12 Hrs later    >0.5                Strongly Recommended  >0.25 - <0.5        Recommended   0.1 - 0.25          Discouraged              Remeasure/reassess PCT  <0.1                Strongly Discouraged     Remeasure/reassess PCT    As 28 day mortality risk marker: \"Change in Procalcitonin Result\" (>80% or <=80%) if Day 0 (or Day 1) and Day 4 values are available. Refer to http://www.[facility]-pct-calculator.com    Change in PCT <=80%  A decrease of PCT levels below or equal to 80% defines a positive change in PCT test result representing a higher risk for 28-day all-cause mortality of patients diagnosed with severe sepsis for septic shock.    Change in PCT >80%  A decrease of PCT levels of more than 80% defines a negative change in PCT result representing a lower risk for 28-day all-cause mortality of patients diagnosed with severe sepsis or septic shock.      LACTIC ACID, REFLEX - Abnormal; Notable for the following components:    Lactate 3.4 (*) "     All other components within normal limits   URINALYSIS, MICROSCOPIC ONLY - Abnormal; Notable for the following components:    Bacteria, UA Trace (*)     Squamous Epithelial Cells, UA 3-6 (*)     All other components within normal limits   SEDIMENTATION RATE - Abnormal; Notable for the following components:    Sed Rate 39 (*)     All other components within normal limits   POCT GLUCOSE FINGERSTICK - Abnormal; Notable for the following components:    Glucose 400 (*)     All other components within normal limits   POCT GLUCOSE FINGERSTICK - Abnormal; Notable for the following components:    Glucose 241 (*)     All other components within normal limits   MAGNESIUM - Normal   PROTIME-INR - Normal   BLOOD CULTURE   BLOOD CULTURE   RESPIRATORY PANEL PCR W/ COVID-19 (SARS-COV-2), NP SWAB IN UTM/VTP, 2 HR TAT   RAINBOW DRAW    Narrative:     The following orders were created for panel order [place] Draw.  Procedure                               Abnormality         Status                     ---------                               -----------         ------                     Green Top (Gel)[[id number]]                                  Final result               Lavender Top[[id number]]                                     Final result               Red Top[[id number]]                                          Final result               Light Blue Top[[id number]]                                   Final result                 Please view results for these tests on the individual orders.   LACTIC ACID, REFLEX   C-REACTIVE PROTEIN   CBC AND DIFFERENTIAL    Narrative:     The following orders were created for panel order CBC & Differential.  Procedure                               Abnormality         Status                     ---------                               -----------         ------                     CBC Auto Differential[[id number]]        Abnormal            Final result                 Please view results for these  tests on the individual orders.   GREEN TOP   LAVENDER TOP   RED TOP   LIGHT BLUE TOP       Meds given in ED:   Medications   sodium chloride 0.9 % flush 10 mL (has no administration in time range)   clindamycin (CLEOCIN) 600 mg in dextrose 5% 50 mL IVPB (premix) (600 mg Intravenous New Bag 3/26/24 1727)   lactated ringers bolus 1,000 mL (has no administration in time range)   insulin regular (humuLIN R,novoLIN R) injection 10 Units (10 Units Subcutaneous Given 3/26/24 1737)           NIH Stroke Scale:  Interval: baseline    Isolation/Infection(s):  No active isolations   MRSA, COVID (History), COVID (rule out)     COVID Testing  Collected .  Resulted .    Nursing report ED to floor:  Mental status: .  Ambulatory status: .  Precautions: .    ED nurse phone extentsion- ..      Electronically signed by Ro Vinson RN at 03/26/24 1739       Steve De Jesus MD at 03/26/24 1716          Subjective   History of Present Illness  68-year-old male presents to the emergency department with complaint of altered mental status.  He has a history of hypertension, hyperlipidemia, poorly controlled diabetes, coronary disease.  Patient was last known well yesterday evening.  Wife states she saw him at that time, went to bed without any bedtime and he was in his usual state of health.  Wife went to work earlier this morning, states she did take about her  but could not tell if he was confused or just sleepy.  This afternoon son states the patient appeared extremely confused, having difficulty caring on normal conversations.  There was no obvious focal numbness or weakness, no facial asymmetry or slurred speech but the patient had slowed speech and was a little disoriented.  EMS was called, on scene patient's glucose was in the 500s.    Upon arrival to the emergency department, was found that patient had a wound to his left foot.  Wife states he had significant worsening to the wounds over the past several days.  No  "fever, no nausea or vomiting.    History provided by:  Patient      Review of Systems   All other systems reviewed and are negative.      Past Medical History:   Diagnosis Date    Arthritis     Autonomic disease     CAD (coronary artery disease) 02/06/2017    Cervical radiculopathy 09/16/2021    Chronic constipation with acute exaccerbation 05/10/2021    Coronary artery disease     Degeneration of cervical intervertebral disc 08/11/2021    Diabetes mellitus     Diabetic foot ulcer 08/31/2020    Diabetic polyneuropathy associated with type 2 diabetes mellitus 01/18/2021    Elevated cholesterol     Gastroesophageal reflux disease 05/13/2019    Headache     HTN (hypertension), benign 05/03/2017    Hyperlipidemia     Hypertension     Mixed hyperlipidemia 02/07/2017    Multiple lung nodules 01/26/2020    5mm, 9 mm RLL identified 1/2020, not present 10/2019.    Myocardial infarction     Osteomyelitis 01/22/2020    Osteomyelitis of fifth toe of right foot 10/07/2019    Pancreatitis     Persistent insomnia 01/20/2020    Renal disorder     Sleep apnea 02/06/2017    Sleep apnea with use of continuous positive airway pressure (CPAP)     NON-COMPLIANT    Slow transit constipation 01/16/2019    Spinal stenosis in cervical region 09/16/2021    Vitamin D deficiency 03/02/2021       Allergies   Allergen Reactions    Cefepime Hives and Anaphylaxis    Bactrim [Sulfamethoxazole-Trimethoprim] Other (See Comments)     \"RENAL FAILURE\"    Vancomycin Itching    Zolpidem Unknown - High Severity     \"makes him crazy\"    Zolpidem Tartrate Unknown - Low Severity and Provider Review Needed    Metronidazole Rash       Past Surgical History:   Procedure Laterality Date    ABDOMINAL SURGERY      AMPUTATION FOOT / TOE Left 10/2021    5th digit     ANTERIOR CERVICAL DISCECTOMY W/ FUSION N/A 8/5/2022    Procedure: CERVICAL DISCECTOMY ANTERIOR WITH FUSION C5-6 with possible plating of C5-7 with neuromonitoring and 1 c-arm;  Surgeon: Karel Soliz, " MD;  Location:  [facility] OR;  Service: Neurosurgery;  Laterality: N/A;    APPENDECTOMY      BACK SURGERY      CARDIAC CATHETERIZATION Left 2021    Procedure: Left Heart Cath w poss intervention left anatomical snuff box acess;  Surgeon: Omkar Charles DO;  Location:  [facility] CATH INVASIVE LOCATION;  Service: Cardiology;  Laterality: Left;    CARDIAC SURGERY      CATARACT EXTRACTION      CERVICAL SPINE SURGERY      COLONOSCOPY N/A 2017    Normal exam repeat in 5 years    COLONOSCOPY N/A 2019    Mild acute inflammation    COLONOSCOPY W/ POLYPECTOMY  2014    Hyperplastic polyp    CORONARY ARTERY BYPASS GRAFT  10/2015    ENDOSCOPY  2011    Gastritis with hemorrhage    ENDOSCOPY N/A 2017    Normal exam    ENDOSCOPY N/A 2019    Gastritis    ENDOSCOPY N/A 2020    Non-erosive gastritis with hemorrhage    ENDOSCOPY N/A 02/10/2021    Esophagitis    FOOT SURGERY Left     INCISION AND DRAINAGE OF WOUND Left 2007    spider bite       Family History   Problem Relation Age of Onset    Colon cancer Father     Heart disease Father     Colon cancer Sister     Colon polyps Sister     Alzheimer's disease Mother     Coronary artery disease Sister     Coronary artery disease Sister        Social History     Socioeconomic History    Marital status:    Tobacco Use    Smoking status: Former     Current packs/day: 0.00     Types: Cigarettes     Quit date:      Years since quittin.2    Smokeless tobacco: Never    Tobacco comments:     smoked in highschool   Vaping Use    Vaping status: Never Used   Substance and Sexual Activity    Alcohol use: No    Drug use: No    Sexual activity: Defer           Objective   Physical Exam  Vitals and nursing note reviewed.   Constitutional:       Appearance: He is ill-appearing.      Comments: Patient is awake but drowsy, confused, ill-appearing but not in distress   HENT:      Head: Normocephalic and atraumatic.      Nose: Nose  normal. No congestion or rhinorrhea.      Mouth/Throat:      Mouth: Mucous membranes are moist.      Pharynx: No oropharyngeal exudate or posterior oropharyngeal erythema.   Eyes:      Extraocular Movements: Extraocular movements intact.      Conjunctiva/sclera: Conjunctivae normal.      Pupils: Pupils are equal, round, and reactive to light.   Cardiovascular:      Rate and Rhythm: Regular rhythm. Tachycardia present.      Heart sounds: Normal heart sounds. No murmur heard.  Pulmonary:      Effort: Pulmonary effort is normal.      Breath sounds: Normal breath sounds. No wheezing, rhonchi or rales.   Abdominal:      General: Abdomen is flat. Bowel sounds are normal.      Palpations: Abdomen is soft.   Genitourinary:     Comments: Scrotum and perineum normal appearing without evidence of cellulitis, soft tissue infection  Musculoskeletal:      Right lower leg: No edema.      Left lower leg: Edema present.      Comments: Large soft tissue wound plantar aspect of left foot about the calcaneus, base is beefy red.  No surrounding erythema, no abnormal discharge or drainage.  Wound to the plantar aspect of forefoot.  Wound to dorsal medial aspect left foot with surrounding erythema.  Some nonviable appearing tissue at the base of the wound, no abnormal discharge or drainage.  Wound is foul-smelling   Skin:     General: Skin is warm and dry.   Neurological:      Mental Status: He is disoriented.         Procedures      Lab Results (last 24 hours)       Procedure Component Value Units Date/Time    POC Glucose Once [[id number]]  (Abnormal) Collected: 03/26/24 1315    Specimen: Blood Updated: 03/26/24 1326     Glucose 400 mg/dL      Comment: : [id number] Karel Sanchezeter ID: [id number]       CBC & Differential [[id number]]  (Abnormal) Collected: 03/26/24 1326    Specimen: Blood Updated: 03/26/24 1336    Narrative:      The following orders were created for panel order CBC & Differential.  Procedure                                Abnormality         Status                     ---------                               -----------         ------                     CBC Auto Differential[[id number]]        Abnormal            Final result                 Please view results for these tests on the individual orders.    Comprehensive Metabolic Panel [[id number]]  (Abnormal) Collected: 03/26/24 1326    Specimen: Blood Updated: 03/26/24 1418     Glucose 438 mg/dL      BUN 29 mg/dL      Creatinine 1.93 mg/dL      Sodium 140 mmol/L      Potassium 3.4 mmol/L      Chloride 103 mmol/L      CO2 23.0 mmol/L      Calcium 9.5 mg/dL      Total Protein 6.5 g/dL      Albumin 3.7 g/dL      ALT (SGPT) 8 U/L      AST (SGOT) 13 U/L      Alkaline Phosphatase 91 U/L      Total Bilirubin 0.7 mg/dL      Globulin 2.8 gm/dL      A/G Ratio 1.3 g/dL      BUN/Creatinine Ratio 15.0     Anion Gap 14.0 mmol/L      eGFR 37.5 mL/min/1.73     Narrative:      GFR Normal >60  Chronic Kidney Disease <60  Kidney Failure <15      Lactic Acid, Plasma [[id number]]  (Abnormal) Collected: 03/26/24 1326    Specimen: Blood Updated: 03/26/24 1417     Lactate 3.6 mmol/L     CBC Auto Differential [[id number]]  (Abnormal) Collected: 03/26/24 1326    Specimen: Blood Updated: 03/26/24 1336     WBC 14.69 10*3/mm3      RBC 3.90 10*6/mm3      Hemoglobin 11.3 g/dL      Hematocrit 33.8 %      MCV 86.7 fL      MCH 29.0 pg      MCHC 33.4 g/dL      RDW 14.4 %      RDW-SD 44.8 fl      MPV 12.4 fL      Platelets 171 10*3/mm3      Neutrophil % 89.1 %      Lymphocyte % 1.7 %      Monocyte % 8.1 %      Eosinophil % 0.1 %      Basophil % 0.2 %      Immature Grans % 0.8 %      Neutrophils, Absolute 13.09 10*3/mm3      Lymphocytes, Absolute 0.25 10*3/mm3      Monocytes, Absolute 1.19 10*3/mm3      Eosinophils, Absolute 0.01 10*3/mm3      Basophils, Absolute 0.03 10*3/mm3      Immature Grans, Absolute 0.12 10*3/mm3      nRBC 0.0 /100 WBC     Procalcitonin [[id number]]  (Abnormal) Collected: 03/26/24 132     "Specimen: Blood Updated: 03/26/24 1402     Procalcitonin 6.56 ng/mL     Narrative:      As a Marker for Sepsis (Non-Neonates):    1. <0.5 ng/mL represents a low risk of severe sepsis and/or septic shock.  2. >2 ng/mL represents a high risk of severe sepsis and/or septic shock.    As a Marker for Lower Respiratory Tract Infections that require antibiotic therapy:    PCT on Admission    Antibiotic Therapy       6-12 Hrs later    >0.5                Strongly Recommended  >0.25 - <0.5        Recommended   0.1 - 0.25          Discouraged              Remeasure/reassess PCT  <0.1                Strongly Discouraged     Remeasure/reassess PCT    As 28 day mortality risk marker: \"Change in Procalcitonin Result\" (>80% or <=80%) if Day 0 (or Day 1) and Day 4 values are available. Refer to http://www.[employer]ct-calculator.com    Change in PCT <=80%  A decrease of PCT levels below or equal to 80% defines a positive change in PCT test result representing a higher risk for 28-day all-cause mortality of patients diagnosed with severe sepsis for septic shock.    Change in PCT >80%  A decrease of PCT levels of more than 80% defines a negative change in PCT result representing a lower risk for 28-day all-cause mortality of patients diagnosed with severe sepsis or septic shock.       Magnesium [[id number]]  (Normal) Collected: 03/26/24 1326    Specimen: Blood Updated: 03/26/24 1418     Magnesium 1.7 mg/dL     Protime-INR [[id number]]  (Normal) Collected: 03/26/24 1326    Specimen: Blood Updated: 03/26/24 1345     Protime 14.3 Seconds      INR 1.07    Sedimentation Rate [[id number]]  (Abnormal) Collected: 03/26/24 1326    Specimen: Blood Updated: 03/26/24 1713     Sed Rate 39 mm/hr     Blood Culture - Blood, Arm, Right [[id number]] Collected: 03/26/24 1346    Specimen: Blood from Arm, Right Updated: 03/26/24 1408    Blood Culture - Blood, Arm, Left [[id number]] Collected: 03/26/24 1346    Specimen: Blood from Arm, Left Updated: 03/26/24 " 1409    Urinalysis With Culture If Indicated - Urine, Catheter [[id number]]  (Abnormal) Collected: 03/26/24 1434    Specimen: Urine, Catheter Updated: 03/26/24 1500     Color, UA Yellow     Appearance, UA Clear     pH, UA 5.5     Specific Gravity, UA 1.020     Glucose,  mg/dL (2+)     Ketones, UA Trace     Bilirubin, UA Negative     Blood, UA Small (1+)     Protein, UA >=300 mg/dL (3+)     Leuk Esterase, UA Negative     Nitrite, UA Negative     Urobilinogen, UA 0.2 E.U./dL    Narrative:      In absence of clinical symptoms, the presence of pyuria, bacteria, and/or nitrites on the urinalysis result does not correlate with infection.    Urinalysis, Microscopic Only - Urine, Catheter [[id number]]  (Abnormal) Collected: 03/26/24 1434    Specimen: Urine, Catheter Updated: 03/26/24 1500     RBC, UA 0-2 /HPF      WBC, UA 0-2 /HPF      Comment: Urine culture not indicated.        Bacteria, UA Trace /HPF      Squamous Epithelial Cells, UA 3-6 /HPF      Hyaline Casts, UA 0-2 /LPF      Methodology Manual Light Microscopy    STAT Lactic Acid, Reflex [[id number]]  (Abnormal) Collected: 03/26/24 1624    Specimen: Blood from Arm, Left Updated: 03/26/24 1658     Lactate 3.4 mmol/L          XR Foot 3+ View Left    Result Date: 3/26/2024  EXAMINATION: XR FOOT 3+ VW LEFT-  3/26/2024 4:19 PM  HISTORY: foot infection  3 view LEFT foot exam.  Previous amputation of the mid/distal fifth metatarsal.  Interval resection or resorption of the fourth metatarsal head.  No bone destruction or soft tissue air is seen.  High-grade degenerative disease at the first metatarsal phalangeal joint.  Unchanged appearance of prominent dorsal midfoot spurring and prominent inferior calcaneal spurring.  Mild soft tissue edema over the dorsum of the foot is less prominent as compared with the previous exam.      1. No bone destruction or soft tissue air is seen. 2. Prominent degenerative spurring.    This report was signed and finalized on 3/26/2024 5:25  PM by Dr. Gomez Mcgill MD.      XR Chest 1 View    Result Date: 3/26/2024  EXAM: XR CHEST 1 VW-  DATE: 3/26/2024 1:15 PM  HISTORY: ams   COMPARISON: 8/28/2023.  TECHNIQUE:  Frontal view(s) of the chest submitted.  FINDINGS:  Patient is status post median sternotomy and CABG. There is enlargement of the cardiac silhouette. There are low lung volumes with vascular crowding versus mild volume overload. No effusion or pneumothorax is seen.        1. Postoperative chest and low lung volumes with vascular crowding versus mild volume overload.  This report was signed and finalized on 3/26/2024 2:34 PM by Eran Christina.      CT Head Without Contrast    Result Date: 3/26/2024  EXAM: CT HEAD WO CONTRAST-  DATE: 3/26/2024 1:03 PM  HISTORY: ams   COMPARISON: 10/10/2023.  DOSE LENGTH PRODUCT: 876.8 mGy.cm  Automated exposure control was also utilized to decrease patient radiation dose.  TECHNIQUE: Unenhanced CT images obtained from vertex to skull base with multiplanar reformats.  FINDINGS: There is no acute intracranial hemorrhage, midline shift, mass effect, or hydrocephalus. There is no CT evidence for acute infarct.  There are chronic changes with volume loss and chronic small vessel ischemic change of the periventricular white matter.  SOFT TISSUES: The scalp soft tissues are unremarkable.   SINUS: There is partially imaged mucosal thickening at the right maxillary sinus.  ORBITS: The visualized orbits and globes are unremarkable. There is bilateral lens extraction.       1. Chronic changes and no acute intracranial findings.  This report was signed and finalized on 3/26/2024 2:10 PM by Eran Christina.        ED Course  ED Course as of 03/26/24 1728   Tue Mar 26, 2024   1724 68-year-old male with history of poorly controlled diabetes, hypertension, hyperlipidemia, coronary disease who presents to the ED with generalized weakness and altered mental status.  Likely secondary to infection of the left foot.  Patient was  afebrile on arrival to the ER but was slightly tachycardic, white count 14,000.  Lactic acid 3.6.  Repeat 3.4.  Procalcitonin 15.  Chest x-ray negative for pneumonia.  Urine negative for UTI.  Abdomen soft, nontender nondistended.  No vomiting or diarrhea.  Doubt GI etiology for patient's sepsis.    Patient does have several wounds to his left foot, possible etiology for the patient's infection.  Will add x-ray to check for osteomyelitis.  He has an allergy to cephalosporins, allergy to vancomycin.  Will give 600 mg clindamycin IV, plan for admission to the hospitalist service.  Patient is receiving fluids but does not need 30 mill per KG fluid bolus as lactate less than 4, blood pressure normal and no evidence of endorgan damage. [AW]      ED Course User Index  [AW] Steve De Jesus MD                          Total (NIH Stroke Scale): 1                  Medical Decision Making  Amount and/or Complexity of Data Reviewed  Labs: ordered.  Radiology: ordered.  ECG/medicine tests: ordered.    Risk  OTC drugs.  Prescription drug management.        Final diagnoses:   Diabetic foot infection   Poorly controlled diabetes mellitus       ED Disposition  ED Disposition       ED Disposition   Decision to Admit    Condition   --    Comment   Level of Care: Remote Telemetry [26]   Diagnosis: Diabetic foot infection [[id number]]   Admitting Physician: ADITYA GUNN [[id number]]   Attending Physician: ADITYA GUNN [[id number]]   Isolate for COVID?: No [0]   Certification: I Certify That Inpatient Hospital Services Are Medically Necessary For Greater Than 2 Midnights                 No follow-up provider specified.       Medication List        New Prescriptions      famotidine 20 MG tablet  Commonly known as: PEPCID  Take 1 tablet twice a day by oral route.     fluticasone 50 MCG/ACT nasal spray  Commonly known as: FLONASE  1 spray into the nostril(s) as directed by provider Daily.     methylcellulose oral powder  Commonly  known as: Citrucel  Mix 5g as directed and drink twice daily for 90 days.            Changed      * bumetanide 2 MG tablet  Commonly known as: BUMEX  Take 1 tablet by mouth Daily. Take 2 tablets in morning;1 tablet at night  What changed: Another medication with the same name was added. Make sure you understand how and when to take each.     * bumetanide 1 MG tablet  Commonly known as: BUMEX  Take 1 tablet every day by oral route for 30 days.  What changed: You were already taking a medication with the same name, and this prescription was added. Make sure you understand how and when to take each.     * calcitriol 0.5 MCG capsule  Commonly known as: ROCALTROL  Take 1 capsule by mouth Daily.  What changed: Another medication with the same name was added. Make sure you understand how and when to take each.     * calcitriol 0.5 MCG capsule  Commonly known as: ROCALTROL  Take 1 capsule every day by oral route for 90 days.  What changed: You were already taking a medication with the same name, and this prescription was added. Make sure you understand how and when to take each.     * carvedilol 6.25 MG tablet  Commonly known as: COREG  Take 1 tablet by mouth 2 (Two) Times a Day.  What changed: Another medication with the same name was added. Make sure you understand how and when to take each.     * carvedilol 3.125 MG tablet  Commonly known as: COREG  Take 1 tablet twice a day by oral route.  What changed: You were already taking a medication with the same name, and this prescription was added. Make sure you understand how and when to take each.     * Diclofenac Sodium 1 % gel gel  Commonly known as: VOLTAREN  Apply 2 g topically to the appropriate area as directed 4 (Four) Times a Day As Needed.  What changed: Another medication with the same name was added. Make sure you understand how and when to take each.     * Diclofenac Sodium 1 % gel gel  Commonly known as: VOLTAREN  Apply 2 g topically to the appropriate area as  directed 4 (Four) Times a Day.  What changed: You were already taking a medication with the same name, and this prescription was added. Make sure you understand how and when to take each.     * DULoxetine 60 MG capsule  Commonly known as: CYMBALTA  Take 1 capsule by mouth Daily.  What changed: Another medication with the same name was added. Make sure you understand how and when to take each.     * DULoxetine 60 MG capsule  Commonly known as: CYMBALTA  Take 1 capsule by mouth Daily.  What changed: Another medication with the same name was added. Make sure you understand how and when to take each.     * DULoxetine 60 MG capsule  Commonly known as: CYMBALTA  Take 1 capsule by mouth Daily.  What changed: You were already taking a medication with the same name, and this prescription was added. Make sure you understand how and when to take each.     * magnesium hydroxide 400 MG/5ML suspension  Commonly known as: Milk of Magnesia  Take 30 mL every day by oral route for 30 days.  What changed: Another medication with the same name was added. Make sure you understand how and when to take each.     * magnesium hydroxide 400 MG/5ML suspension  Commonly known as: Milk of Magnesia  Take 30mL by mouth once daily for 30 days.  What changed: You were already taking a medication with the same name, and this prescription was added. Make sure you understand how and when to take each.           * This list has 13 medication(s) that are the same as other medications prescribed for you. Read the directions carefully, and ask your doctor or other care provider to review them with you.                   Where to Get Your Medications        These medications were sent to [facility] - [address] [address]      Hours: Monday to Friday 7 AM to 5 PM (Closed 12:30 PM to 1 PM) Phone: [phone]   bumetanide 1 MG tablet  calcitriol 0.5 MCG capsule  carvedilol 3.125 MG  tablet  Diclofenac Sodium 1 % gel gel  DULoxetine 60 MG capsule  famotidine 20 MG tablet  fluticasone 50 MCG/ACT nasal spray  magnesium hydroxide 400 MG/5ML suspension  methylcellulose oral powder            Steve De Jesus MD  03/26/24 1729      Electronically signed by Steve De Jesus MD at 03/26/24 1729       Vital Signs (last day)       Date/Time Temp Temp src Pulse Resp BP Patient Position SpO2    03/27/24 1056 100.2 (37.9) Oral 99 18 127/60 Lying 95    03/27/24 0946 100.5 (38.1) Axillary -- -- -- -- --    03/27/24 0728 99.9 (37.7) Axillary 103 18 107/92 Lying 99    03/27/24 0348 97.6 (36.4) Oral 94 18 134/54 Lying 97    03/26/24 2344 98.7 (37.1) Oral 98 18 131/60 Lying 99    03/26/24 2010 100.5 (38.1) Oral 92 20 107/55 Lying 97    03/26/24 1934 102.3 (39.1) Oral 107 20 120/54 Lying 98    03/26/24 1811 100.7 (38.2) Oral 104 22 150/91 Lying 95    03/26/24 1745 -- -- -- -- -- -- 98    03/26/24 1730 -- -- 95 -- 148/87 -- --    03/26/24 1446 -- -- 103 -- 131/69 -- 97    03/26/24 1416 -- -- 102 -- 132/71 -- --    03/26/24 1346 -- -- 99 -- 144/71 -- 97    03/26/24 1325 97.8 (36.6) Oral -- -- -- -- --    03/26/24 1313 -- -- 104 28 154/74 Lying 96          Current Facility-Administered Medications   Medication Dose Route Frequency Provider Last Rate Last Admin    acetaminophen (TYLENOL) tablet 650 mg  650 mg Oral Q4H PRN Raquel Duncan APRN   650 mg at 03/27/24 1005    Or    acetaminophen (TYLENOL) 160 MG/5ML oral solution 650 mg  650 mg Oral Q4H PRN Raquel Duncan APRN        Or    acetaminophen (TYLENOL) suppository 650 mg  650 mg Rectal Q4H PRN Raquel Duncan APRN        apixaban (ELIQUIS) tablet 5 mg  5 mg Oral Q12H Raquel uDncan APRN   5 mg at 03/26/24 2057    sennosides-docusate (PERICOLACE) 8.6-50 MG per tablet 1 tablet  1 tablet Oral BID Raquel Duncan APRN   1 tablet at 03/26/24 2104    And    polyethylene glycol (MIRALAX) packet 17 g  17 g Oral Daily PRN Raquel Duncan,  SUSAN        And    bisacodyl (DULCOLAX) EC tablet 5 mg  5 mg Oral Daily PRN Raquel Duncan APRN        And    bisacodyl (DULCOLAX) suppository 10 mg  10 mg Rectal Daily PRN Raquel Duncan APRN        bumetanide (BUMEX) tablet 1 mg  1 mg Oral Daily Raquel Duncan APRN   1 mg at 03/26/24 2057    carvedilol (COREG) tablet 6.25 mg  6.25 mg Oral BID Raquel Duncan APRN   6.25 mg at 03/26/24 2057    dextrose (D50W) (25 g/50 mL) IV injection 25 g  25 g Intravenous Q15 Min PRN Raquel Duncan APRN        dextrose (GLUTOSE) oral gel 15 g  15 g Oral Q15 Min PRN Raquel Duncan APRN        donepezil (ARICEPT) tablet 10 mg  10 mg Oral Daily Raquel Duncan APRN   10 mg at 03/27/24 1002    glucagon (GLUCAGEN) injection 1 mg  1 mg Intramuscular Q15 Min PRN Raquel Duncan APRN        insulin detemir (LEVEMIR) injection 30 Units  30 Units Subcutaneous Nightly Raquel Duncan APRN   30 Units at 03/26/24 2213    Insulin Lispro (humaLOG) injection 4-24 Units  4-24 Units Subcutaneous 4x Daily AC & at Bedtime Raquel Duncan APRN   4 Units at 03/27/24 0908    insulin regular (humuLIN R,novoLIN R) injection 10 Units  10 Units Subcutaneous TID AC Steve De Jesus MD   10 Units at 03/27/24 0909    Linezolid (ZYVOX) 600 mg 300 mL  600 mg Intravenous Q12H Raquel Duncan APRN 300 mL/hr at 03/27/24 0037 600 mg at 03/27/24 0037    meropenem (MERREM) 1,000 mg in sodium chloride 0.9 % 100 mL MBP  1,000 mg Intravenous Q8H Raquel Duncan APRN   1,000 mg at 03/27/24 0911    midodrine (PROAMATINE) tablet 10 mg  10 mg Oral TID AC Raquel Duncan APRN        nitroglycerin (NITROSTAT) SL tablet 0.4 mg  0.4 mg Sublingual Q5 Min PRN Raquel Duncan APRN        ondansetron ODT (ZOFRAN-ODT) disintegrating tablet 4 mg  4 mg Oral Q6H PRN Raquel Duncan APRN        Or    ondansetron (ZOFRAN) injection 4 mg  4 mg Intravenous Q6H PRN Raquel Duncan APRN        oxyCODONE (ROXICODONE) immediate release  tablet 10 mg  10 mg Oral BID PRN Raquel Duncan APRN        pantoprazole (PROTONIX) EC tablet 40 mg  40 mg Oral BID Raquel Duncan, APRN   40 mg at 03/26/24 2104    sodium chloride 0.9 % flush 10 mL  10 mL Intravenous PRN Steve De Jesus MD        sodium chloride 0.9 % flush 10 mL  10 mL Intravenous Q12H Raquel Duncan APRN   10 mL at 03/27/24 0955    sodium chloride 0.9 % flush 10 mL  10 mL Intravenous PRN Raquel Duncan APRN        sodium chloride 0.9 % infusion 40 mL  40 mL Intravenous PRN Raquel Duncan APRN        sodium hypochlorite (DAKIN'S 1/4 STRENGTH) 0.125 % topical solution 0.125% solution   Topical BID Raquel Duncan APRN   Given at 03/27/24 1001    ziprasidone (GEODON) injection 20 mg  20 mg Intramuscular Q4H PRN Nabeel Schaeffer DO   20 mg at 03/27/24 1005       Geodon   x1  on 3/26  and x2 on 3/27

## 2024-03-27 NOTE — CASE MANAGEMENT/SOCIAL WORK
Continued Stay Note   [facility]     Patient Name: Erick Luong  MRN: [id number]  Today's Date: 3/27/2024    Admit Date: 3/26/2024    Plan: Undetermined   Discharge Plan       Row Name 03/27/24 1513       Plan    Plan Undetermined    Plan Comments Voicemail left for pt's spouse Joan [phone] to discuss d/c plans.                   Discharge Codes    No documentation.                 Expected Discharge Date and Time       Expected Discharge Date Expected Discharge Time    Mar 29, 2024               MEJIA Carreno

## 2024-03-28 ENCOUNTER — TELEPHONE (OUTPATIENT)
Age: 69
End: 2024-03-28
Payer: COMMERCIAL

## 2024-03-28 LAB
ANION GAP SERPL CALCULATED.3IONS-SCNC: 16 MMOL/L (ref 5–15)
BASOPHILS # BLD AUTO: 0.03 10*3/MM3 (ref 0–0.2)
BASOPHILS NFR BLD AUTO: 0.3 % (ref 0–1.5)
BUN SERPL-MCNC: 36 MG/DL (ref 8–23)
BUN/CREAT SERPL: 17.7 (ref 7–25)
CALCIUM SPEC-SCNC: 9.2 MG/DL (ref 8.6–10.5)
CHLORIDE SERPL-SCNC: 102 MMOL/L (ref 98–107)
CO2 SERPL-SCNC: 22 MMOL/L (ref 22–29)
CREAT SERPL-MCNC: 2.03 MG/DL (ref 0.76–1.27)
DEPRECATED RDW RBC AUTO: 45.3 FL (ref 37–54)
EGFRCR SERPLBLD CKD-EPI 2021: 35.3 ML/MIN/1.73
EOSINOPHIL # BLD AUTO: 0.07 10*3/MM3 (ref 0–0.4)
EOSINOPHIL NFR BLD AUTO: 0.7 % (ref 0.3–6.2)
ERYTHROCYTE [DISTWIDTH] IN BLOOD BY AUTOMATED COUNT: 14.4 % (ref 12.3–15.4)
GLUCOSE BLDC GLUCOMTR-MCNC: 171 MG/DL (ref 70–130)
GLUCOSE BLDC GLUCOMTR-MCNC: 199 MG/DL (ref 70–130)
GLUCOSE BLDC GLUCOMTR-MCNC: 66 MG/DL (ref 70–130)
GLUCOSE BLDC GLUCOMTR-MCNC: 89 MG/DL (ref 70–130)
GLUCOSE BLDC GLUCOMTR-MCNC: 95 MG/DL (ref 70–130)
GLUCOSE SERPL-MCNC: 79 MG/DL (ref 65–99)
HCT VFR BLD AUTO: 41.1 % (ref 37.5–51)
HGB BLD-MCNC: 13.7 G/DL (ref 13–17.7)
IRON 24H UR-MRATE: 18 MCG/DL (ref 59–158)
IRON SATN MFR SERPL: 7 % (ref 20–50)
LYMPHOCYTES # BLD AUTO: 1.27 10*3/MM3 (ref 0.7–3.1)
LYMPHOCYTES NFR BLD AUTO: 13.4 % (ref 19.6–45.3)
MCH RBC QN AUTO: 28.6 PG (ref 26.6–33)
MCHC RBC AUTO-ENTMCNC: 33.3 G/DL (ref 31.5–35.7)
MCV RBC AUTO: 85.8 FL (ref 79–97)
MONOCYTES # BLD AUTO: 0.75 10*3/MM3 (ref 0.1–0.9)
MONOCYTES NFR BLD AUTO: 7.9 % (ref 5–12)
MRSA DNA SPEC QL NAA+PROBE: ABNORMAL
NEUTROPHILS NFR BLD AUTO: 7.29 10*3/MM3 (ref 1.7–7)
NEUTROPHILS NFR BLD AUTO: 77.3 % (ref 42.7–76)
PLATELET # BLD AUTO: 154 10*3/MM3 (ref 140–450)
PMV BLD AUTO: 12.4 FL (ref 6–12)
POTASSIUM SERPL-SCNC: 3.6 MMOL/L (ref 3.5–5.2)
RBC # BLD AUTO: 4.79 10*6/MM3 (ref 4.14–5.8)
SODIUM SERPL-SCNC: 140 MMOL/L (ref 136–145)
SODIUM UR-SCNC: <20 MMOL/L
TIBC SERPL-MCNC: 265 MCG/DL (ref 298–536)
TRANSFERRIN SERPL-MCNC: 178 MG/DL (ref 200–360)
URATE SERPL-MCNC: 8.5 MG/DL (ref 3.4–7)
WBC NRBC COR # BLD AUTO: 9.45 10*3/MM3 (ref 3.4–10.8)

## 2024-03-28 PROCEDURE — 25010000002 LINEZOLID 600 MG/300ML SOLUTION: Performed by: NURSE PRACTITIONER

## 2024-03-28 PROCEDURE — 82570 ASSAY OF URINE CREATININE: CPT | Performed by: INTERNAL MEDICINE

## 2024-03-28 PROCEDURE — 84466 ASSAY OF TRANSFERRIN: CPT | Performed by: INTERNAL MEDICINE

## 2024-03-28 PROCEDURE — 82948 REAGENT STRIP/BLOOD GLUCOSE: CPT

## 2024-03-28 PROCEDURE — 84300 ASSAY OF URINE SODIUM: CPT | Performed by: INTERNAL MEDICINE

## 2024-03-28 PROCEDURE — 25010000002 MEROPENEM PER 100 MG: Performed by: NURSE PRACTITIONER

## 2024-03-28 PROCEDURE — 85025 COMPLETE CBC W/AUTO DIFF WBC: CPT | Performed by: INTERNAL MEDICINE

## 2024-03-28 PROCEDURE — 83540 ASSAY OF IRON: CPT | Performed by: INTERNAL MEDICINE

## 2024-03-28 PROCEDURE — 87641 MR-STAPH DNA AMP PROBE: CPT | Performed by: INTERNAL MEDICINE

## 2024-03-28 PROCEDURE — 99232 SBSQ HOSP IP/OBS MODERATE 35: CPT | Performed by: NURSE PRACTITIONER

## 2024-03-28 PROCEDURE — 63710000001 INSULIN LISPRO (HUMAN) PER 5 UNITS: Performed by: NURSE PRACTITIONER

## 2024-03-28 PROCEDURE — 99221 1ST HOSP IP/OBS SF/LOW 40: CPT | Performed by: UROLOGY

## 2024-03-28 PROCEDURE — 63710000001 INSULIN DETEMIR PER 5 UNITS: Performed by: NURSE PRACTITIONER

## 2024-03-28 PROCEDURE — 80048 BASIC METABOLIC PNL TOTAL CA: CPT | Performed by: INTERNAL MEDICINE

## 2024-03-28 PROCEDURE — 25010000002 ONDANSETRON PER 1 MG: Performed by: NURSE PRACTITIONER

## 2024-03-28 PROCEDURE — 82043 UR ALBUMIN QUANTITATIVE: CPT | Performed by: INTERNAL MEDICINE

## 2024-03-28 PROCEDURE — 84550 ASSAY OF BLOOD/URIC ACID: CPT | Performed by: INTERNAL MEDICINE

## 2024-03-28 PROCEDURE — 99222 1ST HOSP IP/OBS MODERATE 55: CPT | Performed by: INTERNAL MEDICINE

## 2024-03-28 RX ORDER — ASCORBIC ACID 500 MG
1000 TABLET ORAL DAILY
Status: DISCONTINUED | OUTPATIENT
Start: 2024-03-28 | End: 2024-04-11 | Stop reason: HOSPADM

## 2024-03-28 RX ORDER — GLIMEPIRIDE 2 MG/1
1 TABLET ORAL EVERY 12 HOURS SCHEDULED
Status: DISCONTINUED | OUTPATIENT
Start: 2024-03-28 | End: 2024-04-11 | Stop reason: HOSPADM

## 2024-03-28 RX ORDER — FAMOTIDINE 20 MG/1
20 TABLET, FILM COATED ORAL
Status: DISCONTINUED | OUTPATIENT
Start: 2024-03-28 | End: 2024-03-30

## 2024-03-28 RX ORDER — BUSPIRONE HYDROCHLORIDE 10 MG/1
10 TABLET ORAL
Status: DISCONTINUED | OUTPATIENT
Start: 2024-03-28 | End: 2024-03-28

## 2024-03-28 RX ORDER — SUCRALFATE ORAL 1 G/10ML
1 SUSPENSION ORAL
Status: DISCONTINUED | OUTPATIENT
Start: 2024-03-28 | End: 2024-04-11 | Stop reason: HOSPADM

## 2024-03-28 RX ORDER — ZINC SULFATE 50(220)MG
220 CAPSULE ORAL DAILY
Status: DISCONTINUED | OUTPATIENT
Start: 2024-03-28 | End: 2024-04-11 | Stop reason: HOSPADM

## 2024-03-28 RX ORDER — ASPIRIN 81 MG/1
81 TABLET ORAL DAILY
Status: DISCONTINUED | OUTPATIENT
Start: 2024-03-28 | End: 2024-04-08

## 2024-03-28 RX ORDER — PREGABALIN 50 MG/1
50 CAPSULE ORAL DAILY
Status: DISCONTINUED | OUTPATIENT
Start: 2024-03-28 | End: 2024-04-11 | Stop reason: HOSPADM

## 2024-03-28 RX ORDER — MIDODRINE HYDROCHLORIDE 2.5 MG/1
2.5 TABLET ORAL
Status: DISCONTINUED | OUTPATIENT
Start: 2024-03-28 | End: 2024-04-11 | Stop reason: HOSPADM

## 2024-03-28 RX ORDER — ISOSORBIDE MONONITRATE 30 MG/1
30 TABLET, EXTENDED RELEASE ORAL
Status: DISCONTINUED | OUTPATIENT
Start: 2024-03-28 | End: 2024-04-11 | Stop reason: HOSPADM

## 2024-03-28 RX ORDER — CARVEDILOL 3.12 MG/1
3.12 TABLET ORAL 2 TIMES DAILY WITH MEALS
Status: DISCONTINUED | OUTPATIENT
Start: 2024-03-28 | End: 2024-04-11 | Stop reason: HOSPADM

## 2024-03-28 RX ORDER — TAMSULOSIN HYDROCHLORIDE 0.4 MG/1
0.4 CAPSULE ORAL DAILY
Status: DISCONTINUED | OUTPATIENT
Start: 2024-03-28 | End: 2024-04-07

## 2024-03-28 RX ORDER — PREGABALIN 100 MG/1
100 CAPSULE ORAL NIGHTLY
Status: DISCONTINUED | OUTPATIENT
Start: 2024-03-28 | End: 2024-04-11 | Stop reason: HOSPADM

## 2024-03-28 RX ORDER — CALCITRIOL 0.25 UG/1
0.5 CAPSULE, LIQUID FILLED ORAL DAILY
Status: DISCONTINUED | OUTPATIENT
Start: 2024-03-28 | End: 2024-04-05

## 2024-03-28 RX ORDER — ROSUVASTATIN CALCIUM 10 MG/1
10 TABLET, COATED ORAL NIGHTLY
Status: DISCONTINUED | OUTPATIENT
Start: 2024-03-28 | End: 2024-04-11 | Stop reason: HOSPADM

## 2024-03-28 RX ADMIN — INSULIN DETEMIR 30 UNITS: 100 INJECTION, SOLUTION SUBCUTANEOUS at 21:25

## 2024-03-28 RX ADMIN — OXYCODONE HYDROCHLORIDE 10 MG: 5 TABLET ORAL at 01:11

## 2024-03-28 RX ADMIN — DOCUSATE SODIUM AND SENNOSIDES 1 TABLET: 8.6; 5 TABLET, FILM COATED ORAL at 21:26

## 2024-03-28 RX ADMIN — CARVEDILOL 3.12 MG: 3.12 TABLET, FILM COATED ORAL at 18:36

## 2024-03-28 RX ADMIN — CALCITRIOL 0.5 MCG: 0.25 CAPSULE ORAL at 10:48

## 2024-03-28 RX ADMIN — APIXABAN 5 MG: 5 TABLET, FILM COATED ORAL at 10:36

## 2024-03-28 RX ADMIN — PREGABALIN 100 MG: 100 CAPSULE ORAL at 21:26

## 2024-03-28 RX ADMIN — SUCRALFATE 1 G: 1 SUSPENSION ORAL at 10:50

## 2024-03-28 RX ADMIN — PREGABALIN 50 MG: 50 CAPSULE ORAL at 10:56

## 2024-03-28 RX ADMIN — Medication 10 ML: at 10:40

## 2024-03-28 RX ADMIN — ASPIRIN 81 MG: 81 TABLET, COATED ORAL at 10:48

## 2024-03-28 RX ADMIN — Medication 5000 UNITS: at 10:52

## 2024-03-28 RX ADMIN — TIMOLOL MALEATE 1 DROP: 2.5 SOLUTION/ DROPS OPHTHALMIC at 21:26

## 2024-03-28 RX ADMIN — Medication 10 ML: at 21:28

## 2024-03-28 RX ADMIN — INSULIN LISPRO 4 UNITS: 100 INJECTION, SOLUTION INTRAVENOUS; SUBCUTANEOUS at 18:38

## 2024-03-28 RX ADMIN — MEROPENEM 1000 MG: 1 INJECTION, POWDER, FOR SOLUTION INTRAVENOUS at 02:10

## 2024-03-28 RX ADMIN — FAMOTIDINE 20 MG: 20 TABLET, FILM COATED ORAL at 10:48

## 2024-03-28 RX ADMIN — ROSUVASTATIN CALCIUM 10 MG: 10 TABLET, FILM COATED ORAL at 21:26

## 2024-03-28 RX ADMIN — OXYCODONE HYDROCHLORIDE AND ACETAMINOPHEN 1000 MG: 500 TABLET ORAL at 10:48

## 2024-03-28 RX ADMIN — BUMETANIDE 1 MG: 1 TABLET ORAL at 10:36

## 2024-03-28 RX ADMIN — MEROPENEM 1000 MG: 1 INJECTION, POWDER, FOR SOLUTION INTRAVENOUS at 10:35

## 2024-03-28 RX ADMIN — OXYCODONE HYDROCHLORIDE 10 MG: 5 TABLET ORAL at 22:00

## 2024-03-28 RX ADMIN — LINEZOLID 600 MG: 600 INJECTION, SOLUTION INTRAVENOUS at 13:07

## 2024-03-28 RX ADMIN — ISOSORBIDE MONONITRATE 30 MG: 30 TABLET, EXTENDED RELEASE ORAL at 10:49

## 2024-03-28 RX ADMIN — ONDANSETRON 4 MG: 2 INJECTION INTRAMUSCULAR; INTRAVENOUS at 13:14

## 2024-03-28 RX ADMIN — MIDODRINE HYDROCHLORIDE 2.5 MG: 2.5 TABLET ORAL at 10:52

## 2024-03-28 RX ADMIN — FAMOTIDINE 20 MG: 20 TABLET, FILM COATED ORAL at 18:37

## 2024-03-28 RX ADMIN — DONEPEZIL HYDROCHLORIDE 10 MG: 10 TABLET, FILM COATED ORAL at 10:36

## 2024-03-28 RX ADMIN — LINEZOLID 600 MG: 600 INJECTION, SOLUTION INTRAVENOUS at 00:05

## 2024-03-28 RX ADMIN — MIDODRINE HYDROCHLORIDE 2.5 MG: 2.5 TABLET ORAL at 18:37

## 2024-03-28 RX ADMIN — SUCRALFATE 1 G: 1 SUSPENSION ORAL at 18:39

## 2024-03-28 RX ADMIN — Medication 6 MG: at 21:26

## 2024-03-28 RX ADMIN — ZINC SULFATE 220 MG (50 MG) CAPSULE 220 MG: CAPSULE at 10:52

## 2024-03-28 RX ADMIN — APIXABAN 5 MG: 5 TABLET, FILM COATED ORAL at 21:26

## 2024-03-28 RX ADMIN — TAMSULOSIN HYDROCHLORIDE 0.4 MG: 0.4 CAPSULE ORAL at 10:50

## 2024-03-28 NOTE — PROGRESS NOTES
[facility] - PODIATRY    Today's Date: 03/28/24     Patient Name: Erick Luong  MRN: [id number]  CSN: [id number]  PCP: Del Shetty MD  Referring Provider: No ref. provider found  Attending Provider: Rene Maloney MD  Length of Stay: 2    SUBJECTIVE   Chief Complaint: Left foot wounds    HPI: Erick Luong, a 68 y.o.male, who was admitted on 3/6/2024 and podiatry consult was made for left foot wounds.  Patient more alert today and denies pain in left foot.  Patient reports he has not had an MRI of his left foot performed yet.     Past Medical History:   Diagnosis Date    Arthritis     Autonomic disease     CAD (coronary artery disease) 02/06/2017    Cervical radiculopathy 09/16/2021    Chronic constipation with acute exaccerbation 05/10/2021    Coronary artery disease     Degeneration of cervical intervertebral disc 08/11/2021    Diabetes mellitus     Diabetic foot ulcer 08/31/2020    Diabetic polyneuropathy associated with type 2 diabetes mellitus 01/18/2021    Elevated cholesterol     Gastroesophageal reflux disease 05/13/2019    Headache     HTN (hypertension), benign 05/03/2017    Hyperlipidemia     Hypertension     Mixed hyperlipidemia 02/07/2017    Multiple lung nodules 01/26/2020    5mm, 9 mm RLL identified 1/2020, not present 10/2019.    Myocardial infarction     Osteomyelitis 01/22/2020    Osteomyelitis of fifth toe of right foot 10/07/2019    Pancreatitis     Persistent insomnia 01/20/2020    Renal disorder     Sleep apnea 02/06/2017    Sleep apnea with use of continuous positive airway pressure (CPAP)     NON-COMPLIANT    Slow transit constipation 01/16/2019    Spinal stenosis in cervical region 09/16/2021    Vitamin D deficiency 03/02/2021     Past Surgical History:   Procedure Laterality Date    ABDOMINAL SURGERY      AMPUTATION FOOT / TOE Left 10/2021    5th digit     ANTERIOR CERVICAL DISCECTOMY W/ FUSION N/A 8/5/2022    Procedure: CERVICAL DISCECTOMY ANTERIOR WITH FUSION  "C5-6 with possible plating of C5-7 with neuromonitoring and 1 c-arm;  Surgeon: Karel Solzi MD;  Location:  [facility] OR;  Service: Neurosurgery;  Laterality: N/A;    APPENDECTOMY      BACK SURGERY      CARDIAC CATHETERIZATION Left 2021    Procedure: Left Heart Cath w poss intervention left anatomical snuff box acess;  Surgeon: Omkar Charles DO;  Location:  [facility] CATH INVASIVE LOCATION;  Service: Cardiology;  Laterality: Left;    CARDIAC SURGERY      CATARACT EXTRACTION      CERVICAL SPINE SURGERY      COLONOSCOPY N/A 2017    Normal exam repeat in 5 years    COLONOSCOPY N/A 2019    Mild acute inflammation    COLONOSCOPY W/ POLYPECTOMY  2014    Hyperplastic polyp    CORONARY ARTERY BYPASS GRAFT  10/2015    ENDOSCOPY  2011    Gastritis with hemorrhage    ENDOSCOPY N/A 2017    Normal exam    ENDOSCOPY N/A 2019    Gastritis    ENDOSCOPY N/A 2020    Non-erosive gastritis with hemorrhage    ENDOSCOPY N/A 02/10/2021    Esophagitis    FOOT SURGERY Left     INCISION AND DRAINAGE OF WOUND Left 2007    spider bite     Family History   Problem Relation Age of Onset    Colon cancer Father     Heart disease Father     Colon cancer Sister     Colon polyps Sister     Alzheimer's disease Mother     Coronary artery disease Sister     Coronary artery disease Sister      Social History     Socioeconomic History    Marital status:    Tobacco Use    Smoking status: Former     Current packs/day: 0.00     Types: Cigarettes     Quit date:      Years since quittin.2    Smokeless tobacco: Never    Tobacco comments:     smoked in highschool   Vaping Use    Vaping status: Never Used   Substance and Sexual Activity    Alcohol use: No    Drug use: No    Sexual activity: Defer     Allergies   Allergen Reactions    Cefepime Hives and Anaphylaxis    Bactrim [Sulfamethoxazole-Trimethoprim] Other (See Comments)     \"RENAL FAILURE\"    Vancomycin Itching    Zolpidem Unknown " "- High Severity     \"makes him crazy\"    Zolpidem Tartrate Unknown - Low Severity and Provider Review Needed    Metronidazole Rash     Current Facility-Administered Medications   Medication Dose Route Frequency Provider Last Rate Last Admin    acetaminophen (TYLENOL) tablet 650 mg  650 mg Oral Q4H PRN Raquel Duncan APRN   650 mg at 03/27/24 1005    Or    acetaminophen (TYLENOL) 160 MG/5ML oral solution 650 mg  650 mg Oral Q4H PRN Raquel Duncan APRN   650 mg at 03/27/24 2109    Or    acetaminophen (TYLENOL) suppository 650 mg  650 mg Rectal Q4H PRN Raquel Duncan APRN        apixaban (ELIQUIS) tablet 5 mg  5 mg Oral Q12H Raquel Duncan APRN   5 mg at 03/28/24 1036    ascorbic acid (VITAMIN C) tablet 1,000 mg  1,000 mg Oral Daily Rene Maloney MD   1,000 mg at 03/28/24 1048    aspirin EC tablet 81 mg  81 mg Oral Daily Rene Maloney MD   81 mg at 03/28/24 1048    sennosides-docusate (PERICOLACE) 8.6-50 MG per tablet 1 tablet  1 tablet Oral BID Raquel Duncan APRN   1 tablet at 03/26/24 2104    And    polyethylene glycol (MIRALAX) packet 17 g  17 g Oral Daily PRN Raquel Duncan APRN        And    bisacodyl (DULCOLAX) EC tablet 5 mg  5 mg Oral Daily PRN Raquel Duncan APRN        And    bisacodyl (DULCOLAX) suppository 10 mg  10 mg Rectal Daily PRN Raquel Duncan APRN        bumetanide (BUMEX) tablet 1 mg  1 mg Oral Daily Raquel Duncan APRN   1 mg at 03/28/24 1036    calcitriol (ROCALTROL) capsule 0.5 mcg  0.5 mcg Oral Daily Rene Maloney MD   0.5 mcg at 03/28/24 1048    carvedilol (COREG) tablet 3.125 mg  3.125 mg Oral BID With Meals Rene Maloney MD        dextrose (D50W) (25 g/50 mL) IV injection 25 g  25 g Intravenous Q15 Min PRN Raquel Duncan APRN        dextrose (GLUTOSE) oral gel 15 g  15 g Oral Q15 Min PRN Raquel Duncan APRN   15 g at 03/27/24 1710    Diclofenac Sodium (VOLTAREN) 1 % gel 2 g  2 g Topical 4x Daily PRN Rene Maloney MD   "      donepezil (ARICEPT) tablet 10 mg  10 mg Oral Daily Raquel Duncan, APRSHERRILL   10 mg at 03/28/24 1036    famotidine (PEPCID) tablet 20 mg  20 mg Oral BID AC Rene Maloney MD   20 mg at 03/28/24 1048    glucagon (GLUCAGEN) injection 1 mg  1 mg Intramuscular Q15 Min PRN Raquel Duncan, APRN        haloperidol lactate (HALDOL) injection 5 mg  5 mg Intravenous Q6H PRN Rene Maloney MD        insulin detemir (LEVEMIR) injection 30 Units  30 Units Subcutaneous Nightly Raquel Duncan APRN   30 Units at 03/27/24 2034    Insulin Lispro (humaLOG) injection 4-24 Units  4-24 Units Subcutaneous 4x Daily AC & at Bedtime Raquel Duncan, SUSAN   4 Units at 03/27/24 0908    insulin regular (humuLIN R,novoLIN R) injection 10 Units  10 Units Subcutaneous TID  Steve De Jesus MD   10 Units at 03/27/24 1151    isosorbide mononitrate (IMDUR) 24 hr tablet 30 mg  30 mg Oral Q24H Rene Maloney MD   30 mg at 03/28/24 1049    Linezolid (ZYVOX) 600 mg 300 mL  600 mg Intravenous Q12H Raquel Duncan, SUSAN 300 mL/hr at 03/28/24 1307 600 mg at 03/28/24 1307    LORazepam (ATIVAN) injection 1 mg  1 mg Intravenous Q4H PRN Rene Maloney MD   1 mg at 03/27/24 2240    magnesium hydroxide (MILK OF MAGNESIA) suspension 10 mL  10 mL Oral Daily PRN Rene Maloney MD        melatonin tablet 6 mg  6 mg Oral Nightly Rene Maloney MD   6 mg at 03/27/24 2029    midodrine (PROAMATINE) tablet 2.5 mg  2.5 mg Oral TID AC Rene Maloney MD   2.5 mg at 03/28/24 1052    nitroglycerin (NITROSTAT) SL tablet 0.4 mg  0.4 mg Sublingual Q5 Min PRN Raquel Duncan, APRN        ondansetron ODT (ZOFRAN-ODT) disintegrating tablet 4 mg  4 mg Oral Q6H PRN Raquel Duncan APRN        Or    ondansetron (ZOFRAN) injection 4 mg  4 mg Intravenous Q6H PRN Raquel Duncan, SUSAN        oxyCODONE (ROXICODONE) immediate release tablet 10 mg  10 mg Oral BID PRN Raquel Duncan APRN   10 mg at 03/28/24 0113     pregabalin (LYRICA) capsule 100 mg  100 mg Oral Nightly Rene Maloney MD        pregabalin (LYRICA) capsule 50 mg  50 mg Oral Daily Rene Maloney MD   50 mg at 03/28/24 1056    rosuvastatin (CRESTOR) tablet 10 mg  10 mg Oral Nightly Rene Maloney MD        sodium chloride 0.9 % flush 10 mL  10 mL Intravenous PRN Steve De Jesus MD        sodium chloride 0.9 % flush 10 mL  10 mL Intravenous Q12H Raquel Duncan APRN   10 mL at 03/28/24 1040    sodium chloride 0.9 % flush 10 mL  10 mL Intravenous PRN Raquel Duncan APRN        sodium chloride 0.9 % infusion 40 mL  40 mL Intravenous PRN Raquel Duncan APRN        sodium hypochlorite (DAKIN'S 1/4 STRENGTH) 0.125 % topical solution 0.125% solution   Topical BID Raquel Duncan APRN   Given at 03/27/24 1001    sucralfate (CARAFATE) 1 GM/10ML suspension 1 g  1 g Oral TID AC Rene Maloney MD   1 g at 03/28/24 1050    tamsulosin (FLOMAX) 24 hr capsule 0.4 mg  0.4 mg Oral Daily Rene Maloney MD   0.4 mg at 03/28/24 1050    timolol (TIMOPTIC) 0.25 % ophthalmic solution 1 drop  1 drop Both Eyes Q12H Rene Maloney MD        vitamin D3 capsule 5,000 Units  5,000 Units Oral Daily Rene Maloney MD   5,000 Units at 03/28/24 1052    zinc sulfate (ZINCATE) capsule 220 mg  220 mg Oral Daily Rene Maloney MD   220 mg at 03/28/24 1052     Review of Systems   Constitutional:  Negative for activity change.   HENT:  Negative for congestion.    Respiratory:  Negative for apnea and shortness of breath.    Gastrointestinal:  Negative for abdominal pain.   Musculoskeletal:  Positive for arthralgias. Negative for back pain.   Skin:  Positive for wound.   Neurological:  Positive for numbness.   Psychiatric/Behavioral:  Negative for agitation, behavioral problems and confusion.        OBJECTIVE     Vitals:    03/28/24 1645   BP: 119/54   Pulse: 95   Resp: 18   Temp: 98.2 °F (36.8 °C)   SpO2: 99%       PHYSICAL EXAM  GEN:   Pt  presents in hospital bed. Accompanied by none.     Foot/Ankle Exam    GENERAL  Appearance:  appears stated age  Orientation:  AAOx3  Affect:  appropriate    VASCULAR     Right Foot Vascularity   Dorsalis pedis:  2+  Posterior tibial:  2+  Skin temperature:  warm  Edema grading:  Trace  CFT:  3  Varicosities:  none     Left Foot Vascularity   Posterior tibial:  2+  Skin temperature:  warm  Edema grading:  Trace  CFT:  3  Pedal hair growth:  Absent  Varicosities:  none     NEUROLOGIC     Right Foot Neurologic   Light touch sensation: diminished  Vibratory sensation: diminished  Hot/Cold sensation: diminished     Left Foot Neurologic   Light touch sensation: diminished  Vibratory sensation: diminished    MUSCULOSKELETAL     Right Foot Musculoskeletal   Tenderness:  none       Left Foot Musculoskeletal    Amputation   Toes amputated: fifth toe  Tenderness:  none    MUSCLE STRENGTH     Right Foot Muscle Strength   Foot dorsiflexion:  5  Foot plantar flexion:  5  Foot inversion:  5  Foot eversion:  5     Left Foot Muscle Strength   Foot dorsiflexion:  4+  Foot plantar flexion:  4+  Foot inversion:  4+  Foot eversion:  4+    DERMATOLOGIC        Left Foot Dermatologic   Skin  Positive for wound.      Left foot additional comments: Multiple wounds  See photos    Image:                  RADIOLOGY/NUCLEAR:  XR Foot 3+ View Left    Result Date: 3/26/2024  Narrative: EXAMINATION: XR FOOT 3+ VW LEFT-  3/26/2024 4:19 PM  HISTORY: foot infection  3 view LEFT foot exam.  Previous amputation of the mid/distal fifth metatarsal.  Interval resection or resorption of the fourth metatarsal head.  No bone destruction or soft tissue air is seen.  High-grade degenerative disease at the first metatarsal phalangeal joint.  Unchanged appearance of prominent dorsal midfoot spurring and prominent inferior calcaneal spurring.  Mild soft tissue edema over the dorsum of the foot is less prominent as compared with the previous exam.      Impression:  1. No bone destruction or soft tissue air is seen. 2. Prominent degenerative spurring.    This report was signed and finalized on 3/26/2024 5:25 PM by Dr. Gomez Mcgill MD.      XR Chest 1 View    Result Date: 3/26/2024  Narrative: EXAM: XR CHEST 1 VW-  DATE: 3/26/2024 1:15 PM  HISTORY: ams   COMPARISON: 8/28/2023.  TECHNIQUE:  Frontal view(s) of the chest submitted.  FINDINGS:  Patient is status post median sternotomy and CABG. There is enlargement of the cardiac silhouette. There are low lung volumes with vascular crowding versus mild volume overload. No effusion or pneumothorax is seen.       Impression:  1. Postoperative chest and low lung volumes with vascular crowding versus mild volume overload.  This report was signed and finalized on 3/26/2024 2:34 PM by Eran Christina.      CT Head Without Contrast    Result Date: 3/26/2024  Narrative: EXAM: CT HEAD WO CONTRAST-  DATE: 3/26/2024 1:03 PM  HISTORY: ams   COMPARISON: 10/10/2023.  DOSE LENGTH PRODUCT: 876.8 mGy.cm  Automated exposure control was also utilized to decrease patient radiation dose.  TECHNIQUE: Unenhanced CT images obtained from vertex to skull base with multiplanar reformats.  FINDINGS: There is no acute intracranial hemorrhage, midline shift, mass effect, or hydrocephalus. There is no CT evidence for acute infarct.  There are chronic changes with volume loss and chronic small vessel ischemic change of the periventricular white matter.  SOFT TISSUES: The scalp soft tissues are unremarkable.   SINUS: There is partially imaged mucosal thickening at the right maxillary sinus.  ORBITS: The visualized orbits and globes are unremarkable. There is bilateral lens extraction.       Impression: 1. Chronic changes and no acute intracranial findings.  This report was signed and finalized on 3/26/2024 2:10 PM by rEan Christina.       LABORATORY/CULTURE RESULTS:  Results from last 7 days   Lab Units 03/28/24  1004 03/27/24  0449 03/26/24  1326   WBC  10*3/mm3 9.45 12.75* 14.69*   HEMOGLOBIN g/dL 13.7 11.8* 11.3*   HEMATOCRIT % 41.1 35.9* 33.8*   PLATELETS 10*3/mm3 154 163 171     Results from last 7 days   Lab Units 03/28/24  1004 03/27/24  0449 03/26/24  1326   SODIUM mmol/L 140 142 140   POTASSIUM mmol/L 3.6 3.5 3.4*   CHLORIDE mmol/L 102 103 103   CO2 mmol/L 22.0 25.0 23.0   BUN mg/dL 36* 29* 29*   CREATININE mg/dL 2.03* 2.07* 1.93*   CALCIUM mg/dL 9.2 8.9 9.5   BILIRUBIN mg/dL  --   --  0.7   ALK PHOS U/L  --   --  91   ALT (SGPT) U/L  --   --  8   AST (SGOT) U/L  --   --  13   GLUCOSE mg/dL 79 169* 438*     Results from last 7 days   Lab Units 03/26/24  1326   INR  1.07     Microbiology Results (last 10 days)       Procedure Component Value - Date/Time    CANDIDA AURIS SCREEN - Swab, Axilla Right, Axilla Left and Groin [[id number]]  (Normal) Collected: 03/27/24 0351    Lab Status: Preliminary result Specimen: Swab from Axilla Right, Axilla Left and Groin Updated: 03/28/24 0415     Candida Auris Screen Culture No Candida auris isolated at 24 hours    Respiratory Panel PCR w/COVID-19(SARS-CoV-2) [facility]-House, NP Swab in UTM/VTM, 2 HR TAT - Swab, Nasopharynx [[id number]]  (Normal) Collected: 03/26/24 1732    Lab Status: Final result Specimen: Swab from Nasopharynx Updated: 03/26/24 1905     ADENOVIRUS, PCR Not Detected     Coronavirus 229E Not Detected     Coronavirus HKU1 Not Detected     Coronavirus NL63 Not Detected     Coronavirus OC43 Not Detected     COVID19 Not Detected     Human Metapneumovirus Not Detected     Human Rhinovirus/Enterovirus Not Detected     Influenza A PCR Not Detected     Influenza B PCR Not Detected     Parainfluenza Virus 1 Not Detected     Parainfluenza Virus 2 Not Detected     Parainfluenza Virus 3 Not Detected     Parainfluenza Virus 4 Not Detected     RSV, PCR Not Detected     Bordetella pertussis pcr Not Detected     Bordetella parapertussis PCR Not Detected     Chlamydophila pneumoniae PCR Not Detected      Mycoplasma pneumo by PCR Not Detected    Narrative:      In the setting of a positive respiratory panel with a viral infection PLUS a negative procalcitonin without other underlying concern for bacterial infection, consider observing off antibiotics or discontinuation of antibiotics and continue supportive care. If the respiratory panel is positive for atypical bacterial infection (Bordetella pertussis, Chlamydophila pneumoniae, or Mycoplasma pneumoniae), consider antibiotic de-escalation to target atypical bacterial infection.    Blood Culture - Blood, Arm, Right [[id number]]  (Abnormal) Collected: 03/26/24 1346    Lab Status: Preliminary result Specimen: Blood from Arm, Right Updated: 03/28/24 0547     Blood Culture Staphylococcus aureus, MRSA     Comment:   Infectious disease consultation is highly recommended to rule out distant foci of infection.  Methicillin resistant Staphylococcus aureus, Patient may be an isolation risk.        Isolated from Aerobic and Anaerobic Bottles     Gram Stain Aerobic Bottle Gram positive cocci      Anaerobic Bottle Gram positive cocci    Blood Culture - Blood, Arm, Left [[id number]]  (Abnormal) Collected: 03/26/24 1346    Lab Status: Preliminary result Specimen: Blood from Arm, Left Updated: 03/28/24 0547     Blood Culture Staphylococcus aureus, MRSA     Comment:   Infectious disease consultation is highly recommended to rule out distant foci of infection.  Methicillin resistant Staphylococcus aureus, Patient may be an isolation risk.        Isolated from Aerobic and Anaerobic Bottles     Gram Stain Aerobic Bottle Gram positive cocci      Anaerobic Bottle Gram positive cocci    Blood Culture ID, PCR - Blood, Arm, Left [[id number]]  (Abnormal) Collected: 03/26/24 1346    Lab Status: Final result Specimen: Blood from Arm, Left Updated: 03/27/24 0426     BCID, PCR Staph aureus. mecA/C and MREJ (methicillin resistance gene) detected. Identification by BCID2 PCR.     BOTTLE TYPE Aerobic  Bottle    Narrative:      Infectious disease consultation is highly recommended to rule out distant foci of infection.            PATHOLOGY RESULTS:       ASSESSMENT/PLAN   Diabetic foot ulcerations to left foot  Type 2 diabetes mellitus with hyperglycemia  Diabetic polyneuropathy  Sepsis   Metabolic encephalopathy secondary to sepsis-improving    Continue wound care twice daily with Betadine soaked gauze wet-to-dry to be changed by nursing.    Awaiting MRI of left foot to be performed to determine presence of osteomyelitis or abscess.    Antibiotic therapy per infectious disease.    Will continue to follow patient while admitted.      This document has been electronically signed by SUSAN Perez on March 28, 2024 17:09 CDT

## 2024-03-28 NOTE — PLAN OF CARE
Problem: Adult Inpatient Plan of Care  Goal: Plan of Care Review  Outcome: Ongoing, Progressing  Flowsheets (Taken 3/28/2024 0429)  Progress: no change  Plan of Care Reviewed With: patient  Outcome Evaluation: Pt confused and uncooperative at times. Bilateral soft wrist restraints still in use. Pt attempts to pull at lines and get out of bed. Eason in place. PRN ativan given x1 for agitation. Repositioning frequently. IV abx. Tele running afib. Wound care completed per order this AM. Accu check. Safety maintained.

## 2024-03-28 NOTE — TELEPHONE ENCOUNTER
Message sent to Dr. Garrett:  New Consult:   Shannon with [facility]  [phone]  Erick Luong,  1955  [facility] room # 372  Consulting: Amara  For: MRSA bacteremia, left foot infected ulcer. ND    [10:27 AM] Danielle Heath ([facility])  You saw Erick Luong at [facility] in 2020 for acute bacterial prostatitis secondary to E. Coli      [10:20 AM] Julia Adrian ([facility])  Ok ajdyan

## 2024-03-28 NOTE — PROGRESS NOTES
[facility] Medicine Services  INPATIENT PROGRESS NOTE    Patient Name: Erick Luong  Date of Admission: 3/26/2024  Today's Date: 03/28/24  Length of Stay: 2  Primary Care Physician: Del Shetty MD    Subjective   Chief Complaint: Altered mental status    HPI   Patient is alert and cooperative today but remains confused. He was evaluated with his wife at bedside.    Review of Systems   All pertinent negatives and positives are as above. All other systems have been reviewed and are negative unless otherwise stated.     Objective    Temp:  [97.4 °F (36.3 °C)-102.2 °F (39 °C)] 98.2 °F (36.8 °C)  Heart Rate:  [] 95  Resp:  [16-18] 18  BP: (119-148)/(50-81) 119/54  Physical Exam  Constitutional:       General: He is not in acute distress.     Appearance: He is well-developed. He is not diaphoretic.   HENT:      Head: Normocephalic.   Eyes:      Conjunctiva/sclera: Conjunctivae normal.      Pupils: Pupils are equal, round, and reactive to light.   Neck:      Thyroid: No thyromegaly.      Vascular: No JVD.      Trachea: No tracheal deviation.   Cardiovascular:      Rate and Rhythm: Normal rate and regular rhythm.      Heart sounds: Normal heart sounds. No murmur heard.     No friction rub. No gallop.   Pulmonary:      Effort: Pulmonary effort is normal. No respiratory distress.      Breath sounds: Normal breath sounds. No stridor. No wheezing or rales.   Chest:      Chest wall: No tenderness.   Abdominal:      General: Bowel sounds are normal. There is no distension.      Palpations: Abdomen is soft. There is no mass.      Tenderness: There is no abdominal tenderness. There is no guarding or rebound.      Hernia: No hernia is present.   Musculoskeletal:         General: Swelling and signs of injury present. No tenderness or deformity. Normal range of motion.      Cervical back: Normal range of motion and neck supple.      Right lower leg: No edema.      Comments: Left foot  with ulcers noted on dorsal and plantar surface/heel   Lymphadenopathy:      Cervical: No cervical adenopathy.   Skin:     General: Skin is warm and dry.      Coloration: Skin is not pale.      Findings: No erythema or rash.   Neurological:      General: No focal deficit present.      Mental Status: He is alert.      Cranial Nerves: No cranial nerve deficit.      Sensory: No sensory deficit.      Motor: No abnormal muscle tone.      Coordination: Coordination normal.      Comments: Oriented to self only.   Psychiatric:         Mood and Affect: Mood normal.         Behavior: Behavior normal.            File Link    Scan on 3/26/2024 1423 by Ro Vinson RN: Wound 06/15/23 0102 Left anterior plantar        Key Information    Document ID File Type Document Type Description   [id number] Image WOUND IMAGE Wound 06/15/23 0102 Left anterior plantar     Import Information    Attached At Date Time User Dept   Encounter Level 3/26/2024  2:23 PM Ro Vinson RN [facility] Emergency Dept     Encounter    Hospital Encounter on 3/26/24         Results Review:  I have reviewed the labs, radiology results, and diagnostic studies.    Laboratory Data:   Results from last 7 days   Lab Units 03/28/24  1004 03/27/24  0449 03/26/24  1326   WBC 10*3/mm3 9.45 12.75* 14.69*   HEMOGLOBIN g/dL 13.7 11.8* 11.3*   HEMATOCRIT % 41.1 35.9* 33.8*   PLATELETS 10*3/mm3 154 163 171        Results from last 7 days   Lab Units 03/28/24  1004 03/27/24  0449 03/26/24  1326   SODIUM mmol/L 140 142 140   POTASSIUM mmol/L 3.6 3.5 3.4*   CHLORIDE mmol/L 102 103 103   CO2 mmol/L 22.0 25.0 23.0   BUN mg/dL 36* 29* 29*   CREATININE mg/dL 2.03* 2.07* 1.93*   CALCIUM mg/dL 9.2 8.9 9.5   BILIRUBIN mg/dL  --   --  0.7   ALK PHOS U/L  --   --  91   ALT (SGPT) U/L  --   --  8   AST (SGOT) U/L  --   --  13   GLUCOSE mg/dL 79 169* 438*       Culture Data:   Blood Culture   Date Value Ref Range Status   03/26/2024 Abnormal Stain (C)  Preliminary   03/26/2024  Abnormal Stain (C)  Preliminary       Radiology Data:   Imaging Results (Last 24 Hours)       ** No results found for the last 24 hours. **            I have reviewed the patient's current medications.     Assessment/Plan   Assessment  Active Hospital Problems    Diagnosis     **Sepsis     Diabetic foot infection     Chronic kidney disease (CKD), stage IV (severe)     Chronic diastolic heart failure     BPH without obstruction/lower urinary tract symptoms     Diabetic polyneuropathy associated with type 2 diabetes mellitus     Anemia due to chronic kidney disease     Essential hypertension     Type 2 diabetes mellitus with hyperglycemia, with long-term current use of insulin    MRSA bacteremia  Metabolic encephalopathy secondary to sepsis    Treatment Plan  Continue IV antibiotics with Zyvox.  Discontinue meropenem.  Infectious disease input appreciated.    Podiatry consultation input appreciated.  Will follow recommendations.  Please do MR left foot today to rule out osteomyelitis.  Repeat blood cultures in the next 24 to 48 hours to assess for clearing of MRSA bacteremia.    Nephrology and urology input appreciated.  Will monitor renal function and watch for signs of urinary retention    Pain control with opiate pain medications.    Insulin sliding scale and Levemir for type 2 diabetes mellitus    IV Haldol as needed for agitation.  Will restrain patient for safety    DVT prophylaxis with Eliquis    PT/OT/wound care consultations    DVT prophylaxis with Eliquis    CODE STATUS is full code    Medical Decision Making  Number and Complexity of problems: 4 acute, severe, high complexity medical problems.  Multiple chronic medical problems.  Differential Diagnosis: None    Conditions and Status        Condition is worsening.     MDM Data  External documents reviewed: None  Cardiac tracing (EKG, telemetry) interpretation: None  Radiology interpretation: None  Labs reviewed: CBC, BMP, blood cultures reviewed by me  Any  tests that were considered but not ordered: None     Decision rules/scores evaluated (example [id number], Wells, etc): N/A     Discussed with: Patient     Care Planning  Shared decision making: Patient and his wife  Code status and discussions: CODE STATUS is full code    Disposition  Social Determinants of Health that impact treatment or disposition: None  I expect the patient to be discharged to skilled nursing facility in 5 to 7 days.     Electronically signed by Rene Maloney MD, 03/28/24, 18:17 CDT.

## 2024-03-28 NOTE — CASE MANAGEMENT/SOCIAL WORK
Discharge Planning Assessment  [facility]     Patient Name: Erick Luong  MRN: [id number]  Today's Date: 3/28/2024    Admit Date: 3/26/2024    Plan: Undetermined   Discharge Needs Assessment       Row Name 03/28/24 1346       Living Environment    People in Home spouse    Current Living Arrangements home    Potentially Unsafe Housing Conditions none    Primary Care Provided by self    Provides Primary Care For no one    Family Caregiver if Needed spouse    Quality of Family Relationships helpful;involved;supportive    Able to Return to Prior Arrangements yes       Resource/Environmental Concerns    Resource/Environmental Concerns none       Transition Planning    Patient/Family Anticipates Transition to home with family    Patient/Family Anticipated Services at Transition none    Transportation Anticipated family or friend will provide       Discharge Needs Assessment    Readmission Within the Last 30 Days no previous admission in last 30 days    Equipment Currently Used at Home walker, rolling    Concerns to be Addressed denies needs/concerns at this time    Anticipated Changes Related to Illness none    Equipment Needed After Discharge none    Discharge Coordination/Progress Spoke with pt's spouse, Joan [phone], about d/c plans. Pt was d/c'ed from rehab about 2 weeks ago. She plans for pt to return home at d/c. He does have rx coverage. She denies needs at this time.                   Discharge Plan       Row Name 03/28/24 1355       Plan    Final Discharge Disposition Code 01 - home or self-care                  Continued Care and Services - Admitted Since 3/26/2024    No active coordination exists for this encounter.       Expected Discharge Date and Time       Expected Discharge Date Expected Discharge Time    Mar 29, 2024            Demographic Summary    No documentation.                  Functional Status    No documentation.                  Psychosocial    No documentation.                   Abuse/Neglect    No documentation.                  Legal    No documentation.                  Substance Abuse    No documentation.                  Patient Forms    No documentation.                     MEJIA Carreno

## 2024-03-28 NOTE — CONSULTS
INFECTIOUS DISEASES CONSULT NOTE    Patient:  Erick Luong 68 y.o. male  ROOM # 372/1  YOB: 1955  MRN: [id number]  [facility]:  [id number]  Admit date: 3/26/2024   Admitting Physician: Rene Maloney MD  Primary Care Physician: Del Shetty MD  REFERRING PROVIDER: No ref. provider found    Inpatient Infectious Diseases Consult  Consult performed by: Julia Adrian MD  Consult ordered by: Rene Maloney MD          REASON FOR CONSULTATION :     MRSA bacteremia. Left foot infected ulcer         HISTORY OF PRESENT ILLNESS: The patient is a pleasant 68-year-old gentleman who was admitted with confusion and worsening foot wound.    I spoke with his wife earlier who served as an independent historian.  She states just recently got home from rehab.  He was a bit more active and that affected the wound on his foot.  He saw his podiatrist, Dr. Gomes in [place] given the worsening of his foot wounds and underwent debridement.  His wife noted that they worsened very quickly.    His wife noted he was more confused.  Called EMS.  Glucose was in the 500s at that time.    He has since had blood cultures positive for MRSA.  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    Past Medical History:   Diagnosis Date    Arthritis     Autonomic disease     CAD (coronary artery disease) 02/06/2017    Cervical radiculopathy 09/16/2021    Chronic constipation with acute exaccerbation 05/10/2021    Coronary artery disease     Degeneration of cervical intervertebral disc 08/11/2021    Diabetes mellitus     Diabetic foot ulcer 08/31/2020    Diabetic polyneuropathy associated with type 2 diabetes mellitus 01/18/2021    Elevated cholesterol     Gastroesophageal reflux disease 05/13/2019    Headache     HTN (hypertension), benign 05/03/2017    Hyperlipidemia     Hypertension     Mixed hyperlipidemia 02/07/2017    Multiple lung nodules 01/26/2020    5mm, 9 mm RLL identified 1/2020, not present  10/2019.    Myocardial infarction     Osteomyelitis 01/22/2020    Osteomyelitis of fifth toe of right foot 10/07/2019    Pancreatitis     Persistent insomnia 01/20/2020    Renal disorder     Sleep apnea 02/06/2017    Sleep apnea with use of continuous positive airway pressure (CPAP)     NON-COMPLIANT    Slow transit constipation 01/16/2019    Spinal stenosis in cervical region 09/16/2021    Vitamin D deficiency 03/02/2021     Past Surgical History:   Procedure Laterality Date    ABDOMINAL SURGERY      AMPUTATION FOOT / TOE Left 10/2021    5th digit     ANTERIOR CERVICAL DISCECTOMY W/ FUSION N/A 8/5/2022    Procedure: CERVICAL DISCECTOMY ANTERIOR WITH FUSION C5-6 with possible plating of C5-7 with neuromonitoring and 1 c-arm;  Surgeon: Karel Soliz MD;  Location:  [facility] OR;  Service: Neurosurgery;  Laterality: N/A;    APPENDECTOMY      BACK SURGERY      CARDIAC CATHETERIZATION Left 02/08/2021    Procedure: Left Heart Cath w poss intervention left anatomical snuff box acess;  Surgeon: Omkar Charles DO;  Location:  [facility] CATH INVASIVE LOCATION;  Service: Cardiology;  Laterality: Left;    CARDIAC SURGERY      CATARACT EXTRACTION      CERVICAL SPINE SURGERY      COLONOSCOPY N/A 01/31/2017    Normal exam repeat in 5 years    COLONOSCOPY N/A 02/11/2019    Mild acute inflammation    COLONOSCOPY W/ POLYPECTOMY  03/04/2014    Hyperplastic polyp    CORONARY ARTERY BYPASS GRAFT  10/2015    ENDOSCOPY  04/13/2011    Gastritis with hemorrhage    ENDOSCOPY N/A 05/05/2017    Normal exam    ENDOSCOPY N/A 02/11/2019    Gastritis    ENDOSCOPY N/A 09/01/2020    Non-erosive gastritis with hemorrhage    ENDOSCOPY N/A 02/10/2021    Esophagitis    FOOT SURGERY Left     INCISION AND DRAINAGE OF WOUND Left 09/2007    spider bite     Family History   Problem Relation Age of Onset    Colon cancer Father     Heart disease Father     Colon cancer Sister     Colon polyps Sister     Alzheimer's disease Mother     Coronary artery  disease Sister     Coronary artery disease Sister      Social History     Socioeconomic History    Marital status:    Tobacco Use    Smoking status: Former     Current packs/day: 0.00     Types: Cigarettes     Quit date:      Years since quittin.2    Smokeless tobacco: Never    Tobacco comments:     smoked in [place]   Vaping Use    Vaping status: Never Used   Substance and Sexual Activity    Alcohol use: No    Drug use: No    Sexual activity: Defer       Current Scheduled Medications:   apixaban, 5 mg, Oral, Q12H  ascorbic acid, 1,000 mg, Oral, Daily  aspirin, 81 mg, Oral, Daily  bumetanide, 1 mg, Oral, Daily  calcitriol, 0.5 mcg, Oral, Daily  carvedilol, 3.125 mg, Oral, BID With Meals  donepezil, 10 mg, Oral, Daily  famotidine, 20 mg, Oral, BID AC  insulin detemir, 30 Units, Subcutaneous, Nightly  insulin lispro, 4-24 Units, Subcutaneous, 4x Daily AC & at Bedtime  insulin regular, 10 Units, Subcutaneous, TID AC  isosorbide mononitrate, 30 mg, Oral, Q24H  Linezolid, 600 mg, Intravenous, Q12H  melatonin, 6 mg, Oral, Nightly  meropenem, 1,000 mg, Intravenous, Q8H  midodrine, 2.5 mg, Oral, TID AC  pregabalin, 100 mg, Oral, Nightly  pregabalin, 50 mg, Oral, Daily  rosuvastatin, 10 mg, Oral, Nightly  senna-docusate sodium, 1 tablet, Oral, BID  sodium chloride, 10 mL, Intravenous, Q12H  sodium hypochlorite, , Topical, BID  sucralfate, 1 g, Oral, TID AC  tamsulosin, 0.4 mg, Oral, Daily  timolol, 1 drop, Both Eyes, Q12H  vitamin D3, 5,000 Units, Oral, Daily  zinc sulfate, 220 mg, Oral, Daily              Current PRN Medications:    acetaminophen **OR** acetaminophen **OR** acetaminophen    senna-docusate sodium **AND** polyethylene glycol **AND** bisacodyl **AND** bisacodyl    dextrose    dextrose    Diclofenac Sodium    glucagon (human recombinant)    haloperidol lactate    LORazepam    magnesium hydroxide    nitroglycerin    ondansetron ODT **OR** ondansetron    oxyCODONE    sodium chloride    sodium  "chloride    sodium chloride                Allergies   Allergen Reactions    Cefepime Hives and Anaphylaxis    Bactrim [Sulfamethoxazole-Trimethoprim] Other (See Comments)     \"RENAL FAILURE\"    Vancomycin Itching    Zolpidem Unknown - High Severity     \"makes him crazy\"    Zolpidem Tartrate Unknown - Low Severity and Provider Review Needed    Metronidazole Rash           Vital Signs:  /66 (BP Location: Left arm, Patient Position: Lying)   Pulse 86   Temp 98.8 °F (37.1 °C) (Axillary)   Resp 18   Ht 182.9 cm (72\")   Wt 132 kg (290 lb 2 oz)   SpO2 100%   BMI 39.35 kg/m²   Temp (24hrs), Av.9 °F (37.2 °C), Min:97.4 °F (36.3 °C), Max:102.2 °F (39 °C)      Physical Exam  General: Patient is sitting up in bed with Davi RN and GALA Frias at bedside changing patient's linens  Neuro: Patient awake, able to answer simple questions  Respiratory: Effort even and unlabored  Heart: No murmur appreciated  Left foot dressing clean dry and intact-see photos                  Results Review:    I reviewed the patient's new clinical results.    Lab Results:    CBC:   Lab Results   Lab 24  1326 24  1004   WBC 14.69* 12.75* 9.45   HEMOGLOBIN 11.3* 11.8* 13.7   HEMATOCRIT 33.8* 35.9* 41.1   PLATELETS 171 163 154        AutoDiff:   Lab Results   Lab 24  13224  1004   NEUTROPHIL % 89.1* 86.5* 77.3*   LYMPHOCYTE % 1.7* 5.2* 13.4*   MONOCYTES % 8.1 7.5 7.9   EOSINOPHIL % 0.1* 0.0* 0.7   BASOPHIL % 0.2 0.2 0.3   NEUTROS ABS 13.09* 11.03* 7.29*   LYMPHS ABS 0.25* 0.66* 1.27   MONOS ABS 1.19* 0.95* 0.75   EOS ABS 0.01 0.00 0.07   BASOS ABS 0.03 0.03 0.03        Manual Diff:    Lab Results   Lab 24  1326 24  0449 24  1004   NEUTROS ABS 13.09* 11.03* 7.29*        CMP:   Lab Results   Lab 24  1326 24  0449 24  1004   SODIUM 140 142 140   POTASSIUM 3.4* 3.5 3.6   CHLORIDE 103 103 102   CO2 23.0 25.0 22.0   BUN 29* 29* 36*   CREATININE 1.93* " 2.07* 2.03*   CALCIUM 9.5 8.9 9.2   BILIRUBIN 0.7  --   --    ALK PHOS 91  --   --    ALT (SGPT) 8  --   --    AST (SGOT) 13  --   --    GLUCOSE 438* 169* 79       Lab Results (last 72 hours)       Procedure Component Value Units Date/Time    POC Glucose Once [[id number]]  (Normal) Collected: 03/28/24 1234    Specimen: Blood Updated: 03/28/24 1255     Glucose 95 mg/dL      Comment: : [id number] Brianna Varela ID: [id number]       Basic Metabolic Panel [[id number]]  (Abnormal) Collected: 03/28/24 1004    Specimen: Blood Updated: 03/28/24 1129     Glucose 79 mg/dL      BUN 36 mg/dL      Creatinine 2.03 mg/dL      Sodium 140 mmol/L      Potassium 3.6 mmol/L      Comment: Slight hemolysis detected by analyzer. Result may be falsely elevated.        Chloride 102 mmol/L      CO2 22.0 mmol/L      Calcium 9.2 mg/dL      BUN/Creatinine Ratio 17.7     Anion Gap 16.0 mmol/L      eGFR 35.3 mL/min/1.73     Narrative:      GFR Normal >60  Chronic Kidney Disease <60  Kidney Failure <15      CBC & Differential [[id number]]  (Abnormal) Collected: 03/28/24 1004    Specimen: Blood Updated: 03/28/24 1046    Narrative:      The following orders were created for panel order CBC & Differential.  Procedure                               Abnormality         Status                     ---------                               -----------         ------                     CBC Auto Differential[[id number]]        Abnormal            Final result                 Please view results for these tests on the individual orders.    CBC Auto Differential [[id number]]  (Abnormal) Collected: 03/28/24 1004    Specimen: Blood Updated: 03/28/24 1046     WBC 9.45 10*3/mm3      RBC 4.79 10*6/mm3      Hemoglobin 13.7 g/dL      Hematocrit 41.1 %      MCV 85.8 fL      MCH 28.6 pg      MCHC 33.3 g/dL      RDW 14.4 %      RDW-SD 45.3 fl      MPV 12.4 fL      Platelets 154 10*3/mm3      Neutrophil % 77.3 %      Lymphocyte % 13.4 %      Monocyte % 7.9 %       Eosinophil % 0.7 %      Basophil % 0.3 %      Neutrophils, Absolute 7.29 10*3/mm3      Lymphocytes, Absolute 1.27 10*3/mm3      Monocytes, Absolute 0.75 10*3/mm3      Eosinophils, Absolute 0.07 10*3/mm3      Basophils, Absolute 0.03 10*3/mm3     POC Glucose Once [[id number]]  (Abnormal) Collected: 03/28/24 0752    Specimen: Blood Updated: 03/28/24 0803     Glucose 66 mg/dL      Comment: : [id number] Barry CarsynMeter ID: [id number]       Blood Culture - Blood, Arm, Right [[id number]]  (Abnormal) Collected: 03/26/24 1346    Specimen: Blood from Arm, Right Updated: 03/28/24 0547     Blood Culture Staphylococcus aureus, MRSA     Comment:   Infectious disease consultation is highly recommended to rule out distant foci of infection.  Methicillin resistant Staphylococcus aureus, Patient may be an isolation risk.        Isolated from Aerobic and Anaerobic Bottles     Gram Stain Aerobic Bottle Gram positive cocci      Anaerobic Bottle Gram positive cocci    Blood Culture - Blood, Arm, Left [[id number]]  (Abnormal) Collected: 03/26/24 1346    Specimen: Blood from Arm, Left Updated: 03/28/24 0547     Blood Culture Staphylococcus aureus, MRSA     Comment:   Infectious disease consultation is highly recommended to rule out distant foci of infection.  Methicillin resistant Staphylococcus aureus, Patient may be an isolation risk.        Isolated from Aerobic and Anaerobic Bottles     Gram Stain Aerobic Bottle Gram positive cocci      Anaerobic Bottle Gram positive cocci    CANDIDA AURIS SCREEN - Swab, Axilla Right, Axilla Left and Groin [[id number]]  (Normal) Collected: 03/27/24 0351    Specimen: Swab from Axilla Right, Axilla Left and Groin Updated: 03/28/24 0415     Candida Auris Screen Culture No Candida auris isolated at 24 hours    POC Glucose Once [[id number]]  (Normal) Collected: 03/28/24 0353    Specimen: Blood Updated: 03/28/24 0404     Glucose 89 mg/dL      Comment: : [id number] Ashe SaraMeter ID: [id number]        POC Glucose Once [[id number]]  (Abnormal) Collected: 03/27/24 2028    Specimen: Blood Updated: 03/27/24 2039     Glucose 178 mg/dL      Comment: : [id number] Jermaine SaraMeter ID: [id number]       POC Glucose Once [[id number]]  (Normal) Collected: 03/27/24 1745    Specimen: Blood Updated: 03/27/24 1756     Glucose 79 mg/dL      Comment: : [id number] Reyes HarrisineMeter ID: [id number]       POC Glucose Once [[id number]]  (Abnormal) Collected: 03/27/24 1653    Specimen: Blood Updated: 03/27/24 1707     Glucose 62 mg/dL      Comment: : [id number] Reyes HarrisineMeter ID: [id number]       POC Glucose Once [[id number]]  (Abnormal) Collected: 03/27/24 1146    Specimen: Blood Updated: 03/27/24 1157     Glucose 148 mg/dL      Comment: : [id number] Reyes HarrisineMeter ID: [id number]       POC Glucose Once [[id number]]  (Abnormal) Collected: 03/27/24 0842    Specimen: Blood Updated: 03/27/24 0908     Glucose 162 mg/dL      Comment: : [id number] Reyes HarrisineMeter ID: [id number]       Basic Metabolic Panel [[id number]]  (Abnormal) Collected: 03/27/24 0449    Specimen: Blood Updated: 03/27/24 0532     Glucose 169 mg/dL      BUN 29 mg/dL      Creatinine 2.07 mg/dL      Sodium 142 mmol/L      Potassium 3.5 mmol/L      Chloride 103 mmol/L      CO2 25.0 mmol/L      Calcium 8.9 mg/dL      BUN/Creatinine Ratio 14.0     Anion Gap 14.0 mmol/L      eGFR 34.5 mL/min/1.73     Narrative:      GFR Normal >60  Chronic Kidney Disease <60  Kidney Failure <15      Lipid Panel [[id number]] Collected: 03/27/24 0449    Specimen: Blood Updated: 03/27/24 0532     Total Cholesterol 143 mg/dL      Triglycerides 106 mg/dL      HDL Cholesterol 48 mg/dL      LDL Cholesterol  76 mg/dL      VLDL Cholesterol 19 mg/dL      LDL/HDL Ratio 1.54    Narrative:      Cholesterol Reference Ranges  (U.S. Department of Health and Human Services ATP III Classifications)    Desirable          <200 mg/dL  Borderline High    200-239 mg/dL  High  Risk          >240 mg/dL      Triglyceride Reference Ranges  (U.S. Department of Health and Human Services ATP III Classifications)    Normal           <150 mg/dL  Borderline High  150-199 mg/dL  High             200-499 mg/dL  Very High        >500 mg/dL    HDL Reference Ranges  (U.S. Department of Health and Human Services ATP III Classifications)    Low     <40 mg/dl (major risk factor for CHD)  High    >60 mg/dl ('negative' risk factor for CHD)        LDL Reference Ranges  (U.S. Department of Health and Human Services ATP III Classifications)    Optimal          <100 mg/dL  Near Optimal     100-129 mg/dL  Borderline High  130-159 mg/dL  High             160-189 mg/dL  Very High        >189 mg/dL    Magnesium [[id number]]  (Normal) Collected: 03/27/24 0449    Specimen: Blood Updated: 03/27/24 0532     Magnesium 1.7 mg/dL     C-reactive Protein [[id number]]  (Abnormal) Collected: 03/27/24 0449    Specimen: Blood Updated: 03/27/24 0532     C-Reactive Protein 16.33 mg/dL     Phosphorus [[id number]]  (Abnormal) Collected: 03/27/24 0449    Specimen: Blood Updated: 03/27/24 0532     Phosphorus 2.0 mg/dL     TSH [[id number]]  (Normal) Collected: 03/27/24 0449    Specimen: Blood Updated: 03/27/24 0532     TSH 0.571 uIU/mL     STAT Lactic Acid, Reflex [[id number]]  (Normal) Collected: 03/27/24 0450    Specimen: Blood Updated: 03/27/24 0521     Lactate 1.5 mmol/L     Sedimentation Rate [[id number]]  (Abnormal) Collected: 03/27/24 0449    Specimen: Blood Updated: 03/27/24 0519     Sed Rate 35 mm/hr     Hemoglobin A1c [[id number]]  (Abnormal) Collected: 03/27/24 0450    Specimen: Blood Updated: 03/27/24 0513     Hemoglobin A1C 10.80 %     Narrative:      Hemoglobin A1C Ranges:    Increased Risk for Diabetes  5.7% to 6.4%  Diabetes                     >= 6.5%  Diabetic Goal                < 7.0%    CBC Auto Differential [[id number]]  (Abnormal) Collected: 03/27/24 0449    Specimen: Blood Updated: 03/27/24 0458     WBC 12.75 10*3/mm3       RBC 4.12 10*6/mm3      Hemoglobin 11.8 g/dL      Hematocrit 35.9 %      MCV 87.1 fL      MCH 28.6 pg      MCHC 32.9 g/dL      RDW 14.5 %      RDW-SD 46.3 fl      MPV 11.6 fL      Platelets 163 10*3/mm3      Neutrophil % 86.5 %      Lymphocyte % 5.2 %      Monocyte % 7.5 %      Eosinophil % 0.0 %      Basophil % 0.2 %      Immature Grans % 0.6 %      Neutrophils, Absolute 11.03 10*3/mm3      Lymphocytes, Absolute 0.66 10*3/mm3      Monocytes, Absolute 0.95 10*3/mm3      Eosinophils, Absolute 0.00 10*3/mm3      Basophils, Absolute 0.03 10*3/mm3      Immature Grans, Absolute 0.08 10*3/mm3      nRBC 0.0 /100 WBC     Blood Culture ID, PCR - Blood, Arm, Left [[id number]]  (Abnormal) Collected: 03/26/24 1346    Specimen: Blood from Arm, Left Updated: 03/27/24 0426     BCID, PCR Staph aureus. mecA/C and MREJ (methicillin resistance gene) detected. Identification by BCID2 PCR.     BOTTLE TYPE Aerobic Bottle    Narrative:      Infectious disease consultation is highly recommended to rule out distant foci of infection.    POC Glucose Once [[id number]]  (Abnormal) Collected: 03/27/24 0350    Specimen: Blood Updated: 03/27/24 0400     Glucose 159 mg/dL      Comment: : [id number] Leonard BrittaneyMeter ID: [id number]       STAT Lactic Acid, Reflex [[id number]]  (Abnormal) Collected: 03/26/24 2229    Specimen: Blood Updated: 03/26/24 2319     Lactate 2.8 mmol/L     POC Glucose Once [[id number]]  (Abnormal) Collected: 03/26/24 2016    Specimen: Blood Updated: 03/26/24 2028     Glucose 156 mg/dL      Comment: : [id number] Leonard BrittaneyMeter ID: [id number]       STAT Lactic Acid, Reflex [[id number]]  (Abnormal) Collected: 03/26/24 1923    Specimen: Blood Updated: 03/26/24 1959     Lactate 3.9 mmol/L     Respiratory Panel PCR w/COVID-19(SARS-CoV-2) [facility] In-House, NP Swab in UTM/VTM, 2 HR TAT - Swab, Nasopharynx [[id number]]  (Normal) Collected: 03/26/24 1732    Specimen: Swab from Nasopharynx Updated: 03/26/24  1905     ADENOVIRUS, PCR Not Detected     Coronavirus 229E Not Detected     Coronavirus HKU1 Not Detected     Coronavirus NL63 Not Detected     Coronavirus OC43 Not Detected     COVID19 Not Detected     Human Metapneumovirus Not Detected     Human Rhinovirus/Enterovirus Not Detected     Influenza A PCR Not Detected     Influenza B PCR Not Detected     Parainfluenza Virus 1 Not Detected     Parainfluenza Virus 2 Not Detected     Parainfluenza Virus 3 Not Detected     Parainfluenza Virus 4 Not Detected     RSV, PCR Not Detected     Bordetella pertussis pcr Not Detected     Bordetella parapertussis PCR Not Detected     Chlamydophila pneumoniae PCR Not Detected     Mycoplasma pneumo by PCR Not Detected    Narrative:      In the setting of a positive respiratory panel with a viral infection PLUS a negative procalcitonin without other underlying concern for bacterial infection, consider observing off antibiotics or discontinuation of antibiotics and continue supportive care. If the respiratory panel is positive for atypical bacterial infection (Bordetella pertussis, Chlamydophila pneumoniae, or Mycoplasma pneumoniae), consider antibiotic de-escalation to target atypical bacterial infection.    C-reactive Protein [[id number]]  (Abnormal) Collected: 03/26/24 1326    Specimen: Blood Updated: 03/26/24 1818     C-Reactive Protein 4.30 mg/dL     POC Glucose Once [[id number]]  (Abnormal) Collected: 03/26/24 1724    Specimen: Blood Updated: 03/26/24 1736     Glucose 241 mg/dL      Comment: : [id number] Karel Efren ID: [id number]       Sedimentation Rate [[id number]]  (Abnormal) Collected: 03/26/24 1326    Specimen: Blood Updated: 03/26/24 1713     Sed Rate 39 mm/hr     STAT Lactic Acid, Reflex [[id number]]  (Abnormal) Collected: 03/26/24 1624    Specimen: Blood from Arm, Left Updated: 03/26/24 1658     Lactate 3.4 mmol/L     Urinalysis With Culture If Indicated - Urine, Catheter [[id number]]  (Abnormal) Collected: 03/26/24  1434    Specimen: Urine, Catheter Updated: 03/26/24 1500     Color, UA Yellow     Appearance, UA Clear     pH, UA 5.5     Specific Gravity, UA 1.020     Glucose,  mg/dL (2+)     Ketones, UA Trace     Bilirubin, UA Negative     Blood, UA Small (1+)     Protein, UA >=300 mg/dL (3+)     Leuk Esterase, UA Negative     Nitrite, UA Negative     Urobilinogen, UA 0.2 E.U./dL    Narrative:      In absence of clinical symptoms, the presence of pyuria, bacteria, and/or nitrites on the urinalysis result does not correlate with infection.    Urinalysis, Microscopic Only - Urine, Catheter [[id number]]  (Abnormal) Collected: 03/26/24 1434    Specimen: Urine, Catheter Updated: 03/26/24 1500     RBC, UA 0-2 /HPF      WBC, UA 0-2 /HPF      Comment: Urine culture not indicated.        Bacteria, UA Trace /HPF      Squamous Epithelial Cells, UA 3-6 /HPF      Hyaline Casts, UA 0-2 /LPF      Methodology Manual Light Microscopy    [facility] Draw [[id number]] Collected: 03/26/24 1326    Specimen: Blood Updated: 03/26/24 1432    Narrative:      The following orders were created for panel order [place] Draw.  Procedure                               Abnormality         Status                     ---------                               -----------         ------                     Green Top (Gel)[[id number]]                                  Final result               Lavender Top[[id number]]                                     Final result               Red Top[[id number]]                                          Final result               Light Blue Top[[id number]]                                   Final result                 Please view results for these tests on the individual orders.    Green Top (Gel) [[id number]] Collected: 03/26/24 1326    Specimen: Blood Updated: 03/26/24 1432     Extra Tube Hold for add-ons.     Comment: Auto resulted.       Lavender Top [[id number]] Collected: 03/26/24 1326    Specimen: Blood Updated: 03/26/24 1432      Extra Tube hold for add-on     Comment: Auto resulted       Red Top [[id number]] Collected: 03/26/24 1326    Specimen: Blood Updated: 03/26/24 1432     Extra Tube Hold for add-ons.     Comment: Auto resulted.       Light Blue Top [[id number]] Collected: 03/26/24 1326    Specimen: Blood Updated: 03/26/24 1432     Extra Tube Hold for add-ons.     Comment: Auto resulted       Comprehensive Metabolic Panel [[id number]]  (Abnormal) Collected: 03/26/24 1326    Specimen: Blood Updated: 03/26/24 1418     Glucose 438 mg/dL      BUN 29 mg/dL      Creatinine 1.93 mg/dL      Sodium 140 mmol/L      Potassium 3.4 mmol/L      Chloride 103 mmol/L      CO2 23.0 mmol/L      Calcium 9.5 mg/dL      Total Protein 6.5 g/dL      Albumin 3.7 g/dL      ALT (SGPT) 8 U/L      AST (SGOT) 13 U/L      Alkaline Phosphatase 91 U/L      Total Bilirubin 0.7 mg/dL      Globulin 2.8 gm/dL      A/G Ratio 1.3 g/dL      BUN/Creatinine Ratio 15.0     Anion Gap 14.0 mmol/L      eGFR 37.5 mL/min/1.73     Narrative:      GFR Normal >60  Chronic Kidney Disease <60  Kidney Failure <15      Magnesium [[id number]]  (Normal) Collected: 03/26/24 1326    Specimen: Blood Updated: 03/26/24 1418     Magnesium 1.7 mg/dL     Lactic Acid, Plasma [[id number]]  (Abnormal) Collected: 03/26/24 1326    Specimen: Blood Updated: 03/26/24 1417     Lactate 3.6 mmol/L     Procalcitonin [[id number]]  (Abnormal) Collected: 03/26/24 1326    Specimen: Blood Updated: 03/26/24 1402     Procalcitonin 6.56 ng/mL     Narrative:      As a Marker for Sepsis (Non-Neonates):    1. <0.5 ng/mL represents a low risk of severe sepsis and/or septic shock.  2. >2 ng/mL represents a high risk of severe sepsis and/or septic shock.    As a Marker for Lower Respiratory Tract Infections that require antibiotic therapy:    PCT on Admission    Antibiotic Therapy       6-12 Hrs later    >0.5                Strongly Recommended  >0.25 - <0.5        Recommended   0.1 - 0.25          Discouraged               "Remeasure/reassess PCT  <0.1                Strongly Discouraged     Remeasure/reassess PCT    As 28 day mortality risk marker: \"Change in Procalcitonin Result\" (>80% or <=80%) if Day 0 (or Day 1) and Day 4 values are available. Refer to http://[email or web address]    Change in PCT <=80%  A decrease of PCT levels below or equal to 80% defines a positive change in PCT test result representing a higher risk for 28-day all-cause mortality of patients diagnosed with severe sepsis for septic shock.    Change in PCT >80%  A decrease of PCT levels of more than 80% defines a negative change in PCT result representing a lower risk for 28-day all-cause mortality of patients diagnosed with severe sepsis or septic shock.       Protime-INR [[id number]]  (Normal) Collected: 03/26/24 1326    Specimen: Blood Updated: 03/26/24 1345     Protime 14.3 Seconds      INR 1.07    CBC & Differential [[id number]]  (Abnormal) Collected: 03/26/24 1326    Specimen: Blood Updated: 03/26/24 1336    Narrative:      The following orders were created for panel order CBC & Differential.  Procedure                               Abnormality         Status                     ---------                               -----------         ------                     CBC Auto Differential[[id number]]        Abnormal            Final result                 Please view results for these tests on the individual orders.    CBC Auto Differential [[id number]]  (Abnormal) Collected: 03/26/24 1326    Specimen: Blood Updated: 03/26/24 1336     WBC 14.69 10*3/mm3      RBC 3.90 10*6/mm3      Hemoglobin 11.3 g/dL      Hematocrit 33.8 %      MCV 86.7 fL      MCH 29.0 pg      MCHC 33.4 g/dL      RDW 14.4 %      RDW-SD 44.8 fl      MPV 12.4 fL      Platelets 171 10*3/mm3      Neutrophil % 89.1 %      Lymphocyte % 1.7 %      Monocyte % 8.1 %      Eosinophil % 0.1 %      Basophil % 0.2 %      Immature Grans % 0.8 %      Neutrophils, Absolute 13.09 10*3/mm3      " Lymphocytes, Absolute 0.25 10*3/mm3      Monocytes, Absolute 1.19 10*3/mm3      Eosinophils, Absolute 0.01 10*3/mm3      Basophils, Absolute 0.03 10*3/mm3      Immature Grans, Absolute 0.12 10*3/mm3      nRBC 0.0 /100 WBC     POC Glucose Once [[id number]]  (Abnormal) Collected: 03/26/24 1315    Specimen: Blood Updated: 03/26/24 1326     Glucose 400 mg/dL      Comment: : [id number] Karel Schwartz ID: [id number]               Estimated Creatinine Clearance: 49.6 mL/min (A) (by C-G formula based on SCr of 2.03 mg/dL (H)).    Culture Results:    Microbiology Results (last 10 days)       Procedure Component Value - Date/Time    CANDIDA AURIS SCREEN - Swab, Axilla Right, Axilla Left and Groin [[id number]]  (Normal) Collected: 03/27/24 0351    Lab Status: Preliminary result Specimen: Swab from Axilla Right, Axilla Left and Groin Updated: 03/28/24 0415     Candida Auris Screen Culture No Candida auris isolated at 24 hours    Respiratory Panel PCR w/COVID-19(SARS-CoV-2) MICHAEL/ANKUSH/SEAN/[facility] In-House, NP Swab in UTM/VTM, 2 HR TAT - Swab, Nasopharynx [[id number]]  (Normal) Collected: 03/26/24 1732    Lab Status: Final result Specimen: Swab from Nasopharynx Updated: 03/26/24 1905     ADENOVIRUS, PCR Not Detected     Coronavirus 229E Not Detected     Coronavirus HKU1 Not Detected     Coronavirus NL63 Not Detected     Coronavirus OC43 Not Detected     COVID19 Not Detected     Human Metapneumovirus Not Detected     Human Rhinovirus/Enterovirus Not Detected     Influenza A PCR Not Detected     Influenza B PCR Not Detected     Parainfluenza Virus 1 Not Detected     Parainfluenza Virus 2 Not Detected     Parainfluenza Virus 3 Not Detected     Parainfluenza Virus 4 Not Detected     RSV, PCR Not Detected     Bordetella pertussis pcr Not Detected     Bordetella parapertussis PCR Not Detected     Chlamydophila pneumoniae PCR Not Detected     Mycoplasma pneumo by PCR Not Detected    Narrative:      In the setting of a positive  respiratory panel with a viral infection PLUS a negative procalcitonin without other underlying concern for bacterial infection, consider observing off antibiotics or discontinuation of antibiotics and continue supportive care. If the respiratory panel is positive for atypical bacterial infection (Bordetella pertussis, Chlamydophila pneumoniae, or Mycoplasma pneumoniae), consider antibiotic de-escalation to target atypical bacterial infection.    Blood Culture - Blood, Arm, Right [[id number]]  (Abnormal) Collected: 03/26/24 1346    Lab Status: Preliminary result Specimen: Blood from Arm, Right Updated: 03/28/24 0547     Blood Culture Staphylococcus aureus, MRSA     Comment:   Infectious disease consultation is highly recommended to rule out distant foci of infection.  Methicillin resistant Staphylococcus aureus, Patient may be an isolation risk.        Isolated from Aerobic and Anaerobic Bottles     Gram Stain Aerobic Bottle Gram positive cocci      Anaerobic Bottle Gram positive cocci    Blood Culture - Blood, Arm, Left [[id number]]  (Abnormal) Collected: 03/26/24 1346    Lab Status: Preliminary result Specimen: Blood from Arm, Left Updated: 03/28/24 0547     Blood Culture Staphylococcus aureus, MRSA     Comment:   Infectious disease consultation is highly recommended to rule out distant foci of infection.  Methicillin resistant Staphylococcus aureus, Patient may be an isolation risk.        Isolated from Aerobic and Anaerobic Bottles     Gram Stain Aerobic Bottle Gram positive cocci      Anaerobic Bottle Gram positive cocci    Blood Culture ID, PCR - Blood, Arm, Left [[id number]]  (Abnormal) Collected: 03/26/24 1346    Lab Status: Final result Specimen: Blood from Arm, Left Updated: 03/27/24 0426     BCID, PCR Staph aureus. mecA/C and MREJ (methicillin resistance gene) detected. Identification by BCID2 PCR.     BOTTLE TYPE Aerobic Bottle    Narrative:      Infectious disease consultation is highly recommended to rule  out distant foci of infection.               Radiology:         Foot with amputation fifth distal digit.  Resorption noted at the fourth metatarsal head.    Imaging Results (Last 72 Hours)       Procedure Component Value Units Date/Time    XR Foot 3+ View Left [[id number]] Collected: 03/26/24 1723     Updated: 03/26/24 1728    Narrative:      EXAMINATION: XR FOOT 3+ VW LEFT-     3/26/2024 4:19 PM     HISTORY: foot infection     3 view LEFT foot exam.     Previous amputation of the mid/distal fifth metatarsal.     Interval resection or resorption of the fourth metatarsal head.     No bone destruction or soft tissue air is seen.     High-grade degenerative disease at the first metatarsal phalangeal  joint.     Unchanged appearance of prominent dorsal midfoot spurring and prominent  inferior calcaneal spurring.     Mild soft tissue edema over the dorsum of the foot is less prominent as  compared with the previous exam.       Impression:      1. No bone destruction or soft tissue air is seen.  2. Prominent degenerative spurring.           This report was signed and finalized on 3/26/2024 5:25 PM by Dr. Gomez Mcgill MD.       XR Chest 1 View [[id number]] Collected: 03/26/24 1433     Updated: 03/26/24 1437    Narrative:      EXAM: XR CHEST 1 VW-      DATE: 3/26/2024 1:15 PM     HISTORY: ams       COMPARISON: 8/28/2023.     TECHNIQUE:  Frontal view(s) of the chest submitted.     FINDINGS:    Patient is status post median sternotomy and CABG. There is enlargement  of the cardiac silhouette. There are low lung volumes with vascular  crowding versus mild volume overload. No effusion or pneumothorax is  seen.          Impression:         1. Postoperative chest and low lung volumes with vascular crowding  versus mild volume overload.     This report was signed and finalized on 3/26/2024 2:34 PM by Eran Christina.       CT Head Without Contrast [[id number]] Collected: 03/26/24 1408     Updated: 03/26/24 1413    Narrative:       EXAM: CT HEAD WO CONTRAST-      DATE: 3/26/2024 1:03 PM     HISTORY: ams       COMPARISON: 10/10/2023.     DOSE LENGTH PRODUCT: 876.8 mGy.cm  Automated exposure control was also  utilized to decrease patient radiation dose.     TECHNIQUE: Unenhanced CT images obtained from vertex to skull base with  multiplanar reformats.     FINDINGS:  There is no acute intracranial hemorrhage, midline shift, mass effect,  or hydrocephalus. There is no CT evidence for acute infarct.     There are chronic changes with volume loss and chronic small vessel  ischemic change of the periventricular white matter.      SOFT TISSUES: The scalp soft tissues are unremarkable.        SINUS: There is partially imaged mucosal thickening at the right  maxillary sinus.     ORBITS: The visualized orbits and globes are unremarkable. There is  bilateral lens extraction.          Impression:      1. Chronic changes and no acute intracranial findings.      This report was signed and finalized on 3/26/2024 2:10 PM by Eran Christina.                 HOSPITAL PROBLEM LIST:      Sepsis    Essential hypertension    Type 2 diabetes mellitus with hyperglycemia, with long-term current use of insulin    Anemia due to chronic kidney disease    Diabetic polyneuropathy associated with type 2 diabetes mellitus    BPH without obstruction/lower urinary tract symptoms    Chronic diastolic heart failure    Chronic kidney disease (CKD), stage IV (severe)    Diabetic foot infection      IMPRESSION:  MRSA bacteremia in patient with a history of MRSA infection right foot.  Suspect foot wound portal of entry.  Lactic acidosis on admission-resolved  Encephalopathy secondary to infection  Chronic kidney disease stage IIIb  Type 2 diabetes mellitus-poorly controlled with hemoglobin A1c 10.8 on March 27.  Glucose in 400s on admission.      RECOMMENDATION:   Continue linezolid  MRSA nasal screen  Await MRI  Wound care per Dr. Cerrato.  Discontinue meropenem  Continue to monitor  white count for improvement  Glucose control per attending.         Julia Adrian MD  03/28/24  12:55 CDT

## 2024-03-28 NOTE — CONSULTS
Nephrology ([facility]) Consult Note      Patient:  Erick Luong  YOB: 1955  Date of Service: 3/28/2024  MRN: [id number]   Acct: [id number]   Primary Care Physician: Del Shetty MD  Advance Directive:   Code Status and Medical Interventions:   Ordered at: 03/26/24 1815     Level Of Support Discussed With:    Health Care Surrogate     Code Status (Patient has no pulse and is not breathing):    CPR (Attempt to Resuscitate)     Medical Interventions (Patient has pulse or is breathing):    Full Support     Admit Date: 3/26/2024       Hospital Day: 2  Referring Provider: No ref. provider found      Patient personally seen and examined.  Complete chart including Consults, Notes, Operative Reports, Labs, Cardiology, and Radiology studies reviewed as able.        Subjective:  Erick uLong is a 68 y.o. male for whom we were consulted for evaluation and treatment of acute kidney injury.  He has stage IIIb chronic kidney disease baseline, follows with Dr Lomas in our office as he has diabetic nephropathy.  Baseline creatinine approximately 1.8. He has history of insulin-dependent type 2 diabetes, hypertension, abdominal obesity, obstructive sleep apnea, diabetic foot ulcer and coronary artery disease.   patient presented to ER on 3/26 with altered mental status. Had debridement of ongoing wound on left foot the day prior. Left foot warm and erythremic on arrival to ER. Noted to have elevated blood glucose over 400. Initial creatinine of 1.9.  admitted to medical floor for sepsis due to left foot wound. Patient has been agitated and confused during the admission, requiring wrist restraints due to pulling at lines and tubes. Currently is quite drowsy, received sedation overnight. Nursing staff reports that mentation has improved somewhat compared to admission. Patient unable to provide any additional history. Urine output nonoliguric    Allergies:  Cefepime, Bactrim  [sulfamethoxazole-trimethoprim], Vancomycin, Zolpidem, Zolpidem tartrate, and Metronidazole    Home Meds:  Medications Prior to Admission   Medication Sig Dispense Refill Last Dose    amitriptyline (ELAVIL) 25 MG tablet Take 2 tablets by mouth Daily at bedtime. (Patient not taking: Reported on 3/27/2024) 60 tablet 1 Not Taking    apixaban (ELIQUIS) 5 MG tablet tablet Take 1 tablet by mouth Every 12 (Twelve) Hours.       ascorbic acid (VITAMIN C) 1000 MG tablet Take 1 tablet by mouth Daily. 30 tablet 3     Aspirin 81 MG capsule Take 81 mg by mouth Daily.       bumetanide (BUMEX) 1 MG tablet Take 1 tablet every day by oral route for 30 days. 30 tablet 0     bumetanide (BUMEX) 2 MG tablet Take 1 tablet by mouth Daily. Take 2 tablets in morning;1 tablet at night (Patient not taking: Reported on 3/27/2024)   Not Taking    busPIRone (BUSPAR) 10 MG tablet Take 1 tablet by mouth 3 (Three) Times a Day.       calcitriol (ROCALTROL) 0.5 MCG capsule Take 1 capsule by mouth Daily. 90 capsule 4     calcitriol (ROCALTROL) 0.5 MCG capsule Take 1 capsule every day by oral route for 90 days. (Patient not taking: Reported on 3/27/2024) 90 capsule 4 Not Taking    carvedilol (COREG) 3.125 MG tablet Take 1 tablet twice a day by oral route. 60 tablet 4     carvedilol (COREG) 6.25 MG tablet Take 1 tablet by mouth 2 (Two) Times a Day. (Patient not taking: Reported on 3/27/2024) 180 tablet 4 Not Taking    Cholecalciferol (D-5000) 125 MCG (5000 UT) tablet Take 1 tablet by mouth Daily Before Lunch. 30 tablet 2     cloNIDine (CATAPRES) 0.1 MG tablet Take 1 tablet by mouth Every 12 (Twelve) Hours. (Patient not taking: Reported on 3/27/2024) 60 tablet 2 Not Taking    Diclofenac Sodium (VOLTAREN) 1 % gel gel Apply 2 g topically to the appropriate area as directed 4 (Four) Times a Day As Needed. 300 g 11     Diclofenac Sodium (VOLTAREN) 1 % gel gel Apply 2 g topically to the appropriate area as directed 4 (Four) Times a Day. (Patient not taking:  Reported on 3/27/2024) 300 g 11 Not Taking    donepezil (ARICEPT) 10 MG tablet Take 1 tablet by mouth Daily. (Patient not taking: Reported on 3/27/2024) 90 tablet 4 Not Taking    Dulaglutide (Trulicity) 4.5 MG/0.5ML solution pen-injector Inject 0.5 mL under the skin into the appropriate area as directed 1 (One) Time Per Week. (Patient not taking: Reported on 3/27/2024) 2 mL 11 Not Taking    DULoxetine (CYMBALTA) 60 MG capsule Take 1 capsule by mouth Daily. 90 capsule 4     DULoxetine (CYMBALTA) 60 MG capsule Take 1 capsule by mouth Daily. (Patient not taking: Reported on 3/27/2024) 90 capsule 4 Not Taking    DULoxetine (CYMBALTA) 60 MG capsule Take 1 capsule by mouth Daily. (Patient not taking: Reported on 3/27/2024) 90 capsule 4 Not Taking    empagliflozin (JARDIANCE) 25 MG tablet tablet Take 1 tablet by mouth Daily. (Patient not taking: Reported on 3/27/2024) 30 tablet 2 Not Taking    famotidine (PEPCID) 20 MG tablet Take 1 tablet twice a day by oral route. 180 tablet 4     fluticasone (FLONASE) 50 MCG/ACT nasal spray 1 spray into the nostril(s) as directed by provider Daily. (Patient taking differently: 1 spray into the nostril(s) as directed by provider 2 (Two) Times a Day.) 16 g 1     HYDROcodone-acetaminophen (NORCO) 7.5-325 MG per tablet Take 1 tablet by mouth 2 (Two) Times a Day As Needed. (Patient not taking: Reported on 3/27/2024) 40 tablet 0 Not Taking    Insulin Glargine (Lantus SoloStar) 100 UNIT/ML injection pen Inject 20 Units under the skin into the appropriate area as directed Every Evening. 15 mL 3     Insulin Regular Human, Conc, (HumuLIN R U-500 KwikPen) 500 UNIT/ML solution pen-injector CONCENTRATED injection Inject 120 Units under the skin into the appropriate area as directed 2 (Two) Times a Day with breakfast and dinner. (Patient not taking: Reported on 3/27/2024) 18 mL 5 Not Taking    Insulin Regular Human, Conc, (HumuLIN R U-500 KwikPen) 500 UNIT/ML solution pen-injector CONCENTRATED  injection Inject 40 Units under the skin into the appropriate area with regular meals AND 40 Units with large meals. 54 mL 3     isosorbide mononitrate (IMDUR) 30 MG 24 hr tablet Take 1 tablet by mouth Daily.       magnesium hydroxide (Milk of Magnesia) 400 MG/5ML suspension Take 30 mL every day by oral route for 30 days. 900 mL 0     magnesium hydroxide (Milk of Magnesia) 400 MG/5ML suspension Take 30mL by mouth once daily for 30 days. (Patient not taking: Reported on 3/27/2024) 900 mL 0 Not Taking    melatonin 3 MG tablet Take 1 tablet by mouth At Night As Needed for Sleep.       methylcellulose (Citrucel) oral powder Mix 5g as directed and drink twice daily for 90 days. 900 g 10     metoclopramide (REGLAN) 5 MG tablet Take 1 tablet by mouth 3 times a day.       midodrine (PROAMATINE) 2.5 MG tablet Take 1 tablet by mouth 3 (Three) Times a Day Before Meals.       nitroglycerin (NITROSTAT) 0.4 MG SL tablet Dissolve 1 tablet by mouth every 5 minutes as needed for chest pain; MAX 3 tabs/24 hours.  If no improvement after 3 tabs go to ER 25 tablet 0     ondansetron (ZOFRAN) 4 MG tablet Take 1 tablet by mouth Every 6 (Six) Hours As Needed for Nausea or Vomiting.       oxyCODONE (ROXICODONE) 10 MG tablet Take 1 tablet by mouth twice a day as needed 60 tablet 0     pantoprazole (Protonix) 40 MG EC tablet Take 1 tablet by mouth 2 (Two) Times a Day. (Patient not taking: Reported on 3/27/2024) 180 tablet 4 Not Taking    polyethylene glycol (MIRALAX) 17 g packet Take 17 g by mouth Daily. Obtain OTC       prazosin (MINIPRESS) 1 MG capsule Take 1 capsule by mouth every night at bedtime. 30 capsule 2     pregabalin (LYRICA) 100 MG capsule Take 2 capsules by mouth Every Night.       pregabalin (LYRICA) 50 MG capsule Take 1 capsule by mouth Daily.       rosuvastatin (CRESTOR) 10 MG tablet Take 1 tablet by mouth Daily.       sennosides-docusate (PERICOLACE) 8.6-50 MG per tablet Take 1 tablet by mouth Every Night. Obtain OTC        sennosides-docusate (PERICOLACE) 8.6-50 MG per tablet Take 1 tablet by mouth every night at bedtime. (Patient not taking: Reported on 3/27/2024) 30 tablet 2 Not Taking    sodium hypochlorite (DAKIN'S 1/4 STRENGTH) 0.125 % solution topical solution 0.125% Apply to affected area twice daily (Patient not taking: Reported on 3/27/2024) 473 mL 3 Not Taking    sodium hypochlorite (DAKIN'S 1/4 STRENGTH) 0.125 % solution topical solution 0.125% Apply to affected area twice daily as directed (Patient not taking: Reported on 3/27/2024) 473 mL 5 Not Taking    sodium hypochlorite (DAKIN'S) 0.25 % topical solution Use to wound daily as instructed 473 mL 1     sucralfate (CARAFATE) 1 g tablet Take 1 tablet by mouth 3 times a day.       tamsulosin (FLOMAX) 0.4 MG capsule 24 hr capsule Take 1 capsule by mouth Daily. 90 capsule 1     timolol (TIMOPTIC) 0.5 % ophthalmic solution Administer 1 drop to both eyes 2 (Two) Times a Day.       valsartan (DIOVAN) 40 MG tablet Take 1 tablet by mouth Daily.       zinc sulfate (ZINCATE) 50 MG capsule Take 1 capsule by mouth Daily.          Medicines:  Current Facility-Administered Medications   Medication Dose Route Frequency Provider Last Rate Last Admin    acetaminophen (TYLENOL) tablet 650 mg  650 mg Oral Q4H PRN Raquel Duncan, APRN   650 mg at 03/27/24 1005    Or    acetaminophen (TYLENOL) 160 MG/5ML oral solution 650 mg  650 mg Oral Q4H PRN Raquel Duncan, APRN   650 mg at 03/27/24 2109    Or    acetaminophen (TYLENOL) suppository 650 mg  650 mg Rectal Q4H PRN Raquel Duncan, APRN        apixaban (ELIQUIS) tablet 5 mg  5 mg Oral Q12H Raquel Duncan, APRN   5 mg at 03/28/24 1036    ascorbic acid (VITAMIN C) tablet 1,000 mg  1,000 mg Oral Daily Rene Maloney MD        aspirin EC tablet 81 mg  81 mg Oral Daily Rene Maloney MD        sennosides-docusate (PERICOLACE) 8.6-50 MG per tablet 1 tablet  1 tablet Oral BID Raquel Duncan, APRN   1 tablet at 03/26/24 2100     And    polyethylene glycol (MIRALAX) packet 17 g  17 g Oral Daily PRN Raquel Duncan APRN        And    bisacodyl (DULCOLAX) EC tablet 5 mg  5 mg Oral Daily PRN Raquel Duncan APRN        And    bisacodyl (DULCOLAX) suppository 10 mg  10 mg Rectal Daily PRN Raquel Duncan, SUSAN        bumetanide (BUMEX) tablet 1 mg  1 mg Oral Daily Raquel Duncan APRN   1 mg at 03/28/24 1036    calcitriol (ROCALTROL) capsule 0.5 mcg  0.5 mcg Oral Daily Rene Maloney MD        carvedilol (COREG) tablet 3.125 mg  3.125 mg Oral BID With Meals Rene Maloney MD        dextrose (D50W) (25 g/50 mL) IV injection 25 g  25 g Intravenous Q15 Min PRN Raquel Duncan APRN        dextrose (GLUTOSE) oral gel 15 g  15 g Oral Q15 Min PRN Raquel Duncan APRN   15 g at 03/27/24 1710    Diclofenac Sodium (VOLTAREN) 1 % gel 2 g  2 g Topical 4x Daily PRN Rene Maloney MD        donepezil (ARICEPT) tablet 10 mg  10 mg Oral Daily Raquel Duncan APRN   10 mg at 03/28/24 1036    famotidine (PEPCID) tablet 20 mg  20 mg Oral BID AC Rene Maloney MD        glucagon (GLUCAGEN) injection 1 mg  1 mg Intramuscular Q15 Min PRN Raquel Duncan APRN        haloperidol lactate (HALDOL) injection 5 mg  5 mg Intravenous Q6H PRN Rene Maloney MD        insulin detemir (LEVEMIR) injection 30 Units  30 Units Subcutaneous Nightly Raquel Duncan APRN   30 Units at 03/27/24 2034    Insulin Lispro (humaLOG) injection 4-24 Units  4-24 Units Subcutaneous 4x Daily AC & at Bedtime Raquel Duncan APRN   4 Units at 03/27/24 0908    insulin regular (humuLIN R,novoLIN R) injection 10 Units  10 Units Subcutaneous TID AC Steve De Jesus MD   10 Units at 03/27/24 1151    isosorbide mononitrate (IMDUR) 24 hr tablet 30 mg  30 mg Oral Q24H Rene Maloney MD        Linezolid (ZYVOX) 600 mg 300 mL  600 mg Intravenous Q12H Raquel Duncan APRN 300 mL/hr at 03/28/24 0005 600 mg at 03/28/24 0005    LORazepam  (ATIVAN) injection 1 mg  1 mg Intravenous Q4H PRN Rene Maloney MD   1 mg at 03/27/24 2240    magnesium hydroxide (MILK OF MAGNESIA) suspension 10 mL  10 mL Oral Daily PRN Rene Maloney MD        melatonin tablet 6 mg  6 mg Oral Nightly Rene Maloney MD   6 mg at 03/27/24 2029    meropenem (MERREM) 1,000 mg in sodium chloride 0.9 % 100 mL MBP  1,000 mg Intravenous Q8H Raquel Duncan APRN   1,000 mg at 03/28/24 1035    midodrine (PROAMATINE) tablet 2.5 mg  2.5 mg Oral TID AC Rene Maloney MD        nitroglycerin (NITROSTAT) SL tablet 0.4 mg  0.4 mg Sublingual Q5 Min PRN Raquel Duncan APRN        ondansetron ODT (ZOFRAN-ODT) disintegrating tablet 4 mg  4 mg Oral Q6H PRN Raquel Duncan APRN        Or    ondansetron (ZOFRAN) injection 4 mg  4 mg Intravenous Q6H PRN Raquel Duncan APRN        oxyCODONE (ROXICODONE) immediate release tablet 10 mg  10 mg Oral BID PRN Raquel Duncan APRN   10 mg at 03/28/24 0111    pregabalin (LYRICA) capsule 100 mg  100 mg Oral Nightly Rene Maloney MD        pregabalin (LYRICA) capsule 50 mg  50 mg Oral Daily Rene Maloney MD        rosuvastatin (CRESTOR) tablet 10 mg  10 mg Oral Nightly Rene Maloney MD        sodium chloride 0.9 % flush 10 mL  10 mL Intravenous PRN Steve De Jesus MD        sodium chloride 0.9 % flush 10 mL  10 mL Intravenous Q12H Raquel Duncan APRN   10 mL at 03/27/24 2038    sodium chloride 0.9 % flush 10 mL  10 mL Intravenous PRN Raquel Duncan APRN        sodium chloride 0.9 % infusion 40 mL  40 mL Intravenous PRN Raquel Duncan APRN        sodium hypochlorite (DAKIN'S 1/4 STRENGTH) 0.125 % topical solution 0.125% solution   Topical BID Raquel Duncan APRN   Given at 03/27/24 1001    sucralfate (CARAFATE) 1 GM/10ML suspension 1 g  1 g Oral TID AC eRne Maloney MD        tamsulosin (FLOMAX) 24 hr capsule 0.4 mg  0.4 mg Oral Daily Rene Maloney MD        timolol  (TIMOPTIC) 0.25 % ophthalmic solution 1 drop  1 drop Both Eyes Q12H Rene Maloney MD        vitamin D3 capsule 5,000 Units  5,000 Units Oral Daily Rene Maloney MD        zinc sulfate (ZINCATE) capsule 220 mg  220 mg Oral Daily Rene Maloney MD           Past Medical History:  Past Medical History:   Diagnosis Date    Arthritis     Autonomic disease     CAD (coronary artery disease) 02/06/2017    Cervical radiculopathy 09/16/2021    Chronic constipation with acute exaccerbation 05/10/2021    Coronary artery disease     Degeneration of cervical intervertebral disc 08/11/2021    Diabetes mellitus     Diabetic foot ulcer 08/31/2020    Diabetic polyneuropathy associated with type 2 diabetes mellitus 01/18/2021    Elevated cholesterol     Gastroesophageal reflux disease 05/13/2019    Headache     HTN (hypertension), benign 05/03/2017    Hyperlipidemia     Hypertension     Mixed hyperlipidemia 02/07/2017    Multiple lung nodules 01/26/2020    5mm, 9 mm RLL identified 1/2020, not present 10/2019.    Myocardial infarction     Osteomyelitis 01/22/2020    Osteomyelitis of fifth toe of right foot 10/07/2019    Pancreatitis     Persistent insomnia 01/20/2020    Renal disorder     Sleep apnea 02/06/2017    Sleep apnea with use of continuous positive airway pressure (CPAP)     NON-COMPLIANT    Slow transit constipation 01/16/2019    Spinal stenosis in cervical region 09/16/2021    Vitamin D deficiency 03/02/2021       Past Surgical History:  Past Surgical History:   Procedure Laterality Date    ABDOMINAL SURGERY      AMPUTATION FOOT / TOE Left 10/2021    5th digit     ANTERIOR CERVICAL DISCECTOMY W/ FUSION N/A 8/5/2022    Procedure: CERVICAL DISCECTOMY ANTERIOR WITH FUSION C5-6 with possible plating of C5-7 with neuromonitoring and 1 c-arm;  Surgeon: Karel Soliz MD;  [facility];  Service: Neurosurgery;  Laterality: N/A;    APPENDECTOMY      BACK SURGERY      CARDIAC CATHETERIZATION Left 02/08/2021     Procedure: Left Heart Cath w poss intervention left anatomical snuff box aceflora;  Surgeon: Omkar Charles DO;  Location:  [facility];  Service: Cardiology;  Laterality: Left;    CARDIAC SURGERY      CATARACT EXTRACTION      CERVICAL SPINE SURGERY      COLONOSCOPY N/A 2017    Normal exam repeat in 5 years    COLONOSCOPY N/A 2019    Mild acute inflammation    COLONOSCOPY W/ POLYPECTOMY  2014    Hyperplastic polyp    CORONARY ARTERY BYPASS GRAFT  10/2015    ENDOSCOPY  2011    Gastritis with hemorrhage    ENDOSCOPY N/A 2017    Normal exam    ENDOSCOPY N/A 2019    Gastritis    ENDOSCOPY N/A 2020    Non-erosive gastritis with hemorrhage    ENDOSCOPY N/A 02/10/2021    Esophagitis    FOOT SURGERY Left     INCISION AND DRAINAGE OF WOUND Left 2007    spider bite       Family History  Family History   Problem Relation Age of Onset    Colon cancer Father     Heart disease Father     Colon cancer Sister     Colon polyps Sister     Alzheimer's disease Mother     Coronary artery disease Sister     Coronary artery disease Sister        Social History  Social History     Socioeconomic History    Marital status:    Tobacco Use    Smoking status: Former     Current packs/day: 0.00     Types: Cigarettes     Quit date:      Years since quittin.2    Smokeless tobacco: Never    Tobacco comments:     smoked in [place]   Vaping Use    Vaping status: Never Used   Substance and Sexual Activity    Alcohol use: No    Drug use: No    Sexual activity: Defer         Review of Systems:  History obtained from chart review and the patient  General ROS: No fever or chills  Respiratory ROS: No cough, shortness of breath, wheezing  Cardiovascular ROS: No chest pain or palpitations  Gastrointestinal ROS: No abdominal pain or melena  Genito-Urinary ROS: No dysuria or hematuria  Psych ROS: No anxiety and depression  14 point ROS reviewed with the patient and  negative except as noted above and in the HPI unless unable to obtain.      Objective:  Patient Vitals for the past 24 hrs:   BP Temp Temp src Pulse Resp SpO2 Weight   03/28/24 0747 137/69 98.8 °F (37.1 °C) Oral 88 18 93 % --   03/28/24 0330 119/50 97.4 °F (36.3 °C) Oral 90 18 93 % 132 kg (290 lb 2 oz)   03/28/24 0115 -- 99.8 °F (37.7 °C) Axillary -- -- -- --   03/27/24 2330 123/67 97.6 °F (36.4 °C) Oral 91 16 96 % --   03/27/24 2145 -- 98.1 °F (36.7 °C) Oral -- -- -- --   03/27/24 2028 -- (!) 102.2 °F (39 °C) Axillary -- -- -- --   03/27/24 1930 148/81 -- -- 101 16 98 % --   03/27/24 1501 132/99 98.3 °F (36.8 °C) Oral 90 18 95 % --   03/27/24 1322 -- 98.9 °F (37.2 °C) Oral 90 -- -- --   03/27/24 1205 -- (!) 101 °F (38.3 °C) Oral -- -- -- --   03/27/24 1140 -- 99.9 °F (37.7 °C) Oral -- -- -- --   03/27/24 1056 127/60 100.2 °F (37.9 °C) Oral 99 18 95 % --       Intake/Output Summary (Last 24 hours) at 3/28/2024 1038  Last data filed at 3/28/2024 0900  Gross per 24 hour   Intake 120 ml   Output 1025 ml   Net -905 ml     General:  somnolent  Chest:  clear to auscultation bilaterally without respiratory distress  CVS: regular rate and rhythm  Abdominal: soft, nontender, positive bowel sounds  Extremities:  1+ lower extremity edema  Skin:  left foot wound and warm and dry without rash      Labs:  Results from last 7 days   Lab Units 03/27/24  0449 03/26/24  1326   WBC 10*3/mm3 12.75* 14.69*   HEMOGLOBIN g/dL 11.8* 11.3*   HEMATOCRIT % 35.9* 33.8*   PLATELETS 10*3/mm3 163 171         Results from last 7 days   Lab Units 03/27/24  0449 03/26/24  1326   SODIUM mmol/L 142 140   POTASSIUM mmol/L 3.5 3.4*   CHLORIDE mmol/L 103 103   CO2 mmol/L 25.0 23.0   BUN mg/dL 29* 29*   CREATININE mg/dL 2.07* 1.93*   CALCIUM mg/dL 8.9 9.5   EGFR mL/min/1.73 34.5* 37.5*   BILIRUBIN mg/dL  --  0.7   ALK PHOS U/L  --  91   ALT (SGPT) U/L  --  8   AST (SGOT) U/L  --  13   GLUCOSE mg/dL 169* 438*       Radiology:   Imaging Results (Last 72  Hours)       Procedure Component Value Units Date/Time    XR Foot 3+ View Left [[id number]] Collected: 03/26/24 1723     Updated: 03/26/24 1728    Narrative:      EXAMINATION: XR FOOT 3+ VW LEFT-     3/26/2024 4:19 PM     HISTORY: foot infection     3 view LEFT foot exam.     Previous amputation of the mid/distal fifth metatarsal.     Interval resection or resorption of the fourth metatarsal head.     No bone destruction or soft tissue air is seen.     High-grade degenerative disease at the first metatarsal phalangeal  joint.     Unchanged appearance of prominent dorsal midfoot spurring and prominent  inferior calcaneal spurring.     Mild soft tissue edema over the dorsum of the foot is less prominent as  compared with the previous exam.       Impression:      1. No bone destruction or soft tissue air is seen.  2. Prominent degenerative spurring.           This report was signed and finalized on 3/26/2024 5:25 PM by Dr. Gomez Mcgill MD.       XR Chest 1 View [[id number]] Collected: 03/26/24 1433     Updated: 03/26/24 1437    Narrative:      EXAM: XR CHEST 1 VW-      DATE: 3/26/2024 1:15 PM     HISTORY: ams       COMPARISON: 8/28/2023.     TECHNIQUE:  Frontal view(s) of the chest submitted.     FINDINGS:    Patient is status post median sternotomy and CABG. There is enlargement  of the cardiac silhouette. There are low lung volumes with vascular  crowding versus mild volume overload. No effusion or pneumothorax is  seen.          Impression:         1. Postoperative chest and low lung volumes with vascular crowding  versus mild volume overload.     This report was signed and finalized on 3/26/2024 2:34 PM by Eran Christina.       CT Head Without Contrast [[id number]] Collected: 03/26/24 1408     Updated: 03/26/24 1413    Narrative:      EXAM: CT HEAD WO CONTRAST-      DATE: 3/26/2024 1:03 PM     HISTORY: ams       COMPARISON: 10/10/2023.     DOSE LENGTH PRODUCT: 876.8 mGy.cm  Automated exposure control was  also  utilized to decrease patient radiation dose.     TECHNIQUE: Unenhanced CT images obtained from vertex to skull base with  multiplanar reformats.     FINDINGS:  There is no acute intracranial hemorrhage, midline shift, mass effect,  or hydrocephalus. There is no CT evidence for acute infarct.     There are chronic changes with volume loss and chronic small vessel  ischemic change of the periventricular white matter.      SOFT TISSUES: The scalp soft tissues are unremarkable.        SINUS: There is partially imaged mucosal thickening at the right  maxillary sinus.     ORBITS: The visualized orbits and globes are unremarkable. There is  bilateral lens extraction.          Impression:      1. Chronic changes and no acute intracranial findings.      This report was signed and finalized on 3/26/2024 2:10 PM by Eran Christina.               Culture:  Blood Culture   Date Value Ref Range Status   03/26/2024 Staphylococcus aureus, MRSA (C)  Preliminary     Comment:       Infectious disease consultation is highly recommended to rule out distant foci of infection.  Methicillin resistant Staphylococcus aureus, Patient may be an isolation risk.   03/26/2024 Staphylococcus aureus, MRSA (C)  Preliminary     Comment:       Infectious disease consultation is highly recommended to rule out distant foci of infection.  Methicillin resistant Staphylococcus aureus, Patient may be an isolation risk.         Assessment    Acute kidney injury, ATN  Baseline chronic kidney disease stage 3b  Type 2 diabetes, poorly controlled (A1c 10.8%)  Hypertension   Metabolic encephalopathy  Anemia of CKD  Hypokalemia--improved  Sepsis due to infection of left foot wound    Plan:   Continue PO Bumex  AM labs were ordered but not yet drawn  Further assessment and plan pending Dr Lomas's evaluation of patient      Thank you for the consult, we appreciate the opportunity to provide care to your patients.  Feel free to contact me if I can be of any  further assistance.      Stewart Alvarez, APRN  3/28/2024  10:38 CDT

## 2024-03-28 NOTE — CONSULTS
"Urology    Mr. Luong is 68 y.o. male    REASON FOR CONSULT/CHIEF COMPLAINT: Urinary retention    HPI  Asked to see this gentleman for urinary retention.  Nurses placed a Eason catheter while in the emergency room.  It is not documented the volume obtained at time of placement of catheter.  When patient was admitted he was agitated and disoriented.  The admission is in the context of possible encephalopathy secondary to infection from MRSA bacteremia with MRSA infection of the foot.    This history is obtained from the patient.  He is more oriented now at this time.  He did tell me that he was in the nursing home for rehab due to his foot.  He says he has been having some difficulty urinating at home with \"dribbling \".  I was unable to get him to comprehend a few of the questions but I know from his prior history that this was an issue in 2020 when he was seen by our service for similar issues.      The following portions of the patient's history were reviewed and updated as appropriate: allergies, current medications, past family history, past medical history, past social history, past surgical history and problem list.    Review of Systems    Medications Prior to Admission   Medication Sig Dispense Refill Last Dose    amitriptyline (ELAVIL) 25 MG tablet Take 2 tablets by mouth Daily at bedtime. (Patient not taking: Reported on 3/27/2024) 60 tablet 1 Not Taking    apixaban (ELIQUIS) 5 MG tablet tablet Take 1 tablet by mouth Every 12 (Twelve) Hours.       ascorbic acid (VITAMIN C) 1000 MG tablet Take 1 tablet by mouth Daily. 30 tablet 3     Aspirin 81 MG capsule Take 81 mg by mouth Daily.       bumetanide (BUMEX) 1 MG tablet Take 1 tablet every day by oral route for 30 days. 30 tablet 0     bumetanide (BUMEX) 2 MG tablet Take 1 tablet by mouth Daily. Take 2 tablets in morning;1 tablet at night (Patient not taking: Reported on 3/27/2024)   Not Taking    busPIRone (BUSPAR) 10 MG tablet Take 1 tablet by mouth 3 (Three) " Times a Day.       calcitriol (ROCALTROL) 0.5 MCG capsule Take 1 capsule by mouth Daily. 90 capsule 4     calcitriol (ROCALTROL) 0.5 MCG capsule Take 1 capsule every day by oral route for 90 days. (Patient not taking: Reported on 3/27/2024) 90 capsule 4 Not Taking    carvedilol (COREG) 3.125 MG tablet Take 1 tablet twice a day by oral route. 60 tablet 4     carvedilol (COREG) 6.25 MG tablet Take 1 tablet by mouth 2 (Two) Times a Day. (Patient not taking: Reported on 3/27/2024) 180 tablet 4 Not Taking    Cholecalciferol (D-5000) 125 MCG (5000 UT) tablet Take 1 tablet by mouth Daily Before Lunch. 30 tablet 2     cloNIDine (CATAPRES) 0.1 MG tablet Take 1 tablet by mouth Every 12 (Twelve) Hours. (Patient not taking: Reported on 3/27/2024) 60 tablet 2 Not Taking    Diclofenac Sodium (VOLTAREN) 1 % gel gel Apply 2 g topically to the appropriate area as directed 4 (Four) Times a Day As Needed. 300 g 11     Diclofenac Sodium (VOLTAREN) 1 % gel gel Apply 2 g topically to the appropriate area as directed 4 (Four) Times a Day. (Patient not taking: Reported on 3/27/2024) 300 g 11 Not Taking    donepezil (ARICEPT) 10 MG tablet Take 1 tablet by mouth Daily. (Patient not taking: Reported on 3/27/2024) 90 tablet 4 Not Taking    Dulaglutide (Trulicity) 4.5 MG/0.5ML solution pen-injector Inject 0.5 mL under the skin into the appropriate area as directed 1 (One) Time Per Week. (Patient not taking: Reported on 3/27/2024) 2 mL 11 Not Taking    DULoxetine (CYMBALTA) 60 MG capsule Take 1 capsule by mouth Daily. 90 capsule 4     DULoxetine (CYMBALTA) 60 MG capsule Take 1 capsule by mouth Daily. (Patient not taking: Reported on 3/27/2024) 90 capsule 4 Not Taking    DULoxetine (CYMBALTA) 60 MG capsule Take 1 capsule by mouth Daily. (Patient not taking: Reported on 3/27/2024) 90 capsule 4 Not Taking    empagliflozin (JARDIANCE) 25 MG tablet tablet Take 1 tablet by mouth Daily. (Patient not taking: Reported on 3/27/2024) 30 tablet 2 Not Taking     famotidine (PEPCID) 20 MG tablet Take 1 tablet twice a day by oral route. 180 tablet 4     fluticasone (FLONASE) 50 MCG/ACT nasal spray 1 spray into the nostril(s) as directed by provider Daily. (Patient taking differently: 1 spray into the nostril(s) as directed by provider 2 (Two) Times a Day.) 16 g 1     HYDROcodone-acetaminophen (NORCO) 7.5-325 MG per tablet Take 1 tablet by mouth 2 (Two) Times a Day As Needed. (Patient not taking: Reported on 3/27/2024) 40 tablet 0 Not Taking    Insulin Glargine (Lantus SoloStar) 100 UNIT/ML injection pen Inject 20 Units under the skin into the appropriate area as directed Every Evening. 15 mL 3     Insulin Regular Human, Conc, (HumuLIN R U-500 KwikPen) 500 UNIT/ML solution pen-injector CONCENTRATED injection Inject 120 Units under the skin into the appropriate area as directed 2 (Two) Times a Day with breakfast and dinner. (Patient not taking: Reported on 3/27/2024) 18 mL 5 Not Taking    Insulin Regular Human, Conc, (HumuLIN R U-500 KwikPen) 500 UNIT/ML solution pen-injector CONCENTRATED injection Inject 40 Units under the skin into the appropriate area with regular meals AND 40 Units with large meals. 54 mL 3     isosorbide mononitrate (IMDUR) 30 MG 24 hr tablet Take 1 tablet by mouth Daily.       magnesium hydroxide (Milk of Magnesia) 400 MG/5ML suspension Take 30 mL every day by oral route for 30 days. 900 mL 0     magnesium hydroxide (Milk of Magnesia) 400 MG/5ML suspension Take 30mL by mouth once daily for 30 days. (Patient not taking: Reported on 3/27/2024) 900 mL 0 Not Taking    melatonin 3 MG tablet Take 1 tablet by mouth At Night As Needed for Sleep.       methylcellulose (Citrucel) oral powder Mix 5g as directed and drink twice daily for 90 days. 900 g 10     metoclopramide (REGLAN) 5 MG tablet Take 1 tablet by mouth 3 times a day.       midodrine (PROAMATINE) 2.5 MG tablet Take 1 tablet by mouth 3 (Three) Times a Day Before Meals.       nitroglycerin (NITROSTAT)  0.4 MG SL tablet Dissolve 1 tablet by mouth every 5 minutes as needed for chest pain; MAX 3 tabs/24 hours.  If no improvement after 3 tabs go to ER 25 tablet 0     ondansetron (ZOFRAN) 4 MG tablet Take 1 tablet by mouth Every 6 (Six) Hours As Needed for Nausea or Vomiting.       oxyCODONE (ROXICODONE) 10 MG tablet Take 1 tablet by mouth twice a day as needed 60 tablet 0     pantoprazole (Protonix) 40 MG EC tablet Take 1 tablet by mouth 2 (Two) Times a Day. (Patient not taking: Reported on 3/27/2024) 180 tablet 4 Not Taking    polyethylene glycol (MIRALAX) 17 g packet Take 17 g by mouth Daily. Obtain OTC       prazosin (MINIPRESS) 1 MG capsule Take 1 capsule by mouth every night at bedtime. 30 capsule 2     pregabalin (LYRICA) 100 MG capsule Take 2 capsules by mouth Every Night.       pregabalin (LYRICA) 50 MG capsule Take 1 capsule by mouth Daily.       rosuvastatin (CRESTOR) 10 MG tablet Take 1 tablet by mouth Daily.       sennosides-docusate (PERICOLACE) 8.6-50 MG per tablet Take 1 tablet by mouth Every Night. Obtain OTC       sennosides-docusate (PERICOLACE) 8.6-50 MG per tablet Take 1 tablet by mouth every night at bedtime. (Patient not taking: Reported on 3/27/2024) 30 tablet 2 Not Taking    sodium hypochlorite (DAKIN'S 1/4 STRENGTH) 0.125 % solution topical solution 0.125% Apply to affected area twice daily (Patient not taking: Reported on 3/27/2024) 473 mL 3 Not Taking    sodium hypochlorite (DAKIN'S 1/4 STRENGTH) 0.125 % solution topical solution 0.125% Apply to affected area twice daily as directed (Patient not taking: Reported on 3/27/2024) 473 mL 5 Not Taking    sodium hypochlorite (DAKIN'S) 0.25 % topical solution Use to wound daily as instructed 473 mL 1     sucralfate (CARAFATE) 1 g tablet Take 1 tablet by mouth 3 times a day.       tamsulosin (FLOMAX) 0.4 MG capsule 24 hr capsule Take 1 capsule by mouth Daily. 90 capsule 1     timolol (TIMOPTIC) 0.5 % ophthalmic solution Administer 1 drop to both eyes  2 (Two) Times a Day.       valsartan (DIOVAN) 40 MG tablet Take 1 tablet by mouth Daily.       zinc sulfate (ZINCATE) 50 MG capsule Take 1 capsule by mouth Daily.            Current Facility-Administered Medications:     acetaminophen (TYLENOL) tablet 650 mg, 650 mg, Oral, Q4H PRN, 650 mg at 03/27/24 1005 **OR** acetaminophen (TYLENOL) 160 MG/5ML oral solution 650 mg, 650 mg, Oral, Q4H PRN, 650 mg at 03/27/24 2109 **OR** acetaminophen (TYLENOL) suppository 650 mg, 650 mg, Rectal, Q4H PRN, Raquel Duncan, APRN    apixaban (ELIQUIS) tablet 5 mg, 5 mg, Oral, Q12H, Raquel Duncan APRN, 5 mg at 03/28/24 1036    ascorbic acid (VITAMIN C) tablet 1,000 mg, 1,000 mg, Oral, Daily, Rene Maloney MD, 1,000 mg at 03/28/24 1048    aspirin EC tablet 81 mg, 81 mg, Oral, Daily, Rene Maloney MD, 81 mg at 03/28/24 1048    sennosides-docusate (PERICOLACE) 8.6-50 MG per tablet 1 tablet, 1 tablet, Oral, BID, 1 tablet at 03/26/24 2104 **AND** polyethylene glycol (MIRALAX) packet 17 g, 17 g, Oral, Daily PRN **AND** bisacodyl (DULCOLAX) EC tablet 5 mg, 5 mg, Oral, Daily PRN **AND** bisacodyl (DULCOLAX) suppository 10 mg, 10 mg, Rectal, Daily PRN, Raquel Duncan APRN    bumetanide (BUMEX) tablet 1 mg, 1 mg, Oral, Daily, Raquel Duncan APRN, 1 mg at 03/28/24 1036    calcitriol (ROCALTROL) capsule 0.5 mcg, 0.5 mcg, Oral, Daily, Rene Maloney MD, 0.5 mcg at 03/28/24 1048    carvedilol (COREG) tablet 3.125 mg, 3.125 mg, Oral, BID With Meals, Rene Maloney MD    dextrose (D50W) (25 g/50 mL) IV injection 25 g, 25 g, Intravenous, Q15 Min PRN, Raquel Duncan APRN    dextrose (GLUTOSE) oral gel 15 g, 15 g, Oral, Q15 Min PRN, Raquel Duncan APRN, 15 g at 03/27/24 1710    Diclofenac Sodium (VOLTAREN) 1 % gel 2 g, 2 g, Topical, 4x Daily PRN, Rene Maloney MD    donepezil (ARICEPT) tablet 10 mg, 10 mg, Oral, Daily, Raquel Duncan APRN, 10 mg at 03/28/24 1036    famotidine (PEPCID) tablet 20 mg, 20  mg, Oral, BID AC, Rene Maloney MD, 20 mg at 03/28/24 1048    glucagon (GLUCAGEN) injection 1 mg, 1 mg, Intramuscular, Q15 Min PRN, Raquel Duncan, APRN    haloperidol lactate (HALDOL) injection 5 mg, 5 mg, Intravenous, Q6H PRN, Rene Maloney MD    insulin detemir (LEVEMIR) injection 30 Units, 30 Units, Subcutaneous, Nightly, Raquel Duncan, APRN, 30 Units at 03/27/24 2034    Insulin Lispro (humaLOG) injection 4-24 Units, 4-24 Units, Subcutaneous, 4x Daily AC & at Bedtime, Raquel Duncan APRN, 4 Units at 03/27/24 0908    insulin regular (humuLIN R,novoLIN R) injection 10 Units, 10 Units, Subcutaneous, TID AC, Steve De Jesus MD, 10 Units at 03/27/24 1151    isosorbide mononitrate (IMDUR) 24 hr tablet 30 mg, 30 mg, Oral, Q24H, Rene Maloney MD, 30 mg at 03/28/24 1049    Linezolid (ZYVOX) 600 mg 300 mL, 600 mg, Intravenous, Q12H, Raquel Duncan, SUSAN, Last Rate: 300 mL/hr at 03/28/24 1307, 600 mg at 03/28/24 1307    LORazepam (ATIVAN) injection 1 mg, 1 mg, Intravenous, Q4H PRN, Rene Maloney MD, 1 mg at 03/27/24 2240    magnesium hydroxide (MILK OF MAGNESIA) suspension 10 mL, 10 mL, Oral, Daily PRN, Rene Maloney MD    melatonin tablet 6 mg, 6 mg, Oral, Nightly, Rene Maloney MD, 6 mg at 03/27/24 2029    meropenem (MERREM) 1,000 mg in sodium chloride 0.9 % 100 mL MBP, 1,000 mg, Intravenous, Q8H, Raquel Duncan, APRN, 1,000 mg at 03/28/24 1035    midodrine (PROAMATINE) tablet 2.5 mg, 2.5 mg, Oral, TID ACAmara Sotonte E, MD, 2.5 mg at 03/28/24 1052    nitroglycerin (NITROSTAT) SL tablet 0.4 mg, 0.4 mg, Sublingual, Q5 Min PRN, Raquel Duncan APRN    ondansetron ODT (ZOFRAN-ODT) disintegrating tablet 4 mg, 4 mg, Oral, Q6H PRN **OR** ondansetron (ZOFRAN) injection 4 mg, 4 mg, Intravenous, Q6H PRN, Raquel Duncan APRN    oxyCODONE (ROXICODONE) immediate release tablet 10 mg, 10 mg, Oral, BID PRN, Raquel Duncan APRN, 10 mg at 03/28/24 0111     pregabalin (LYRICA) capsule 100 mg, 100 mg, Oral, Nightly, Rene Maloney MD    pregabalin (LYRICA) capsule 50 mg, 50 mg, Oral, Daily, Rene Maloney MD, 50 mg at 03/28/24 1056    rosuvastatin (CRESTOR) tablet 10 mg, 10 mg, Oral, Nightly, Rene Maloney MD    sodium chloride 0.9 % flush 10 mL, 10 mL, Intravenous, PRN, Steve De Jesus MD    sodium chloride 0.9 % flush 10 mL, 10 mL, Intravenous, Q12H, Raquel Duncan, APRN, 10 mL at 03/28/24 1040    sodium chloride 0.9 % flush 10 mL, 10 mL, Intravenous, PRN, Rqauel Duncan, SUSAN    sodium chloride 0.9 % infusion 40 mL, 40 mL, Intravenous, PRN, Raquel Duncan, APRN    sodium hypochlorite (DAKIN'S 1/4 STRENGTH) 0.125 % topical solution 0.125% solution, , Topical, BID, Raquel Duncan, APRN, Given at 03/27/24 1001    sucralfate (CARAFATE) 1 GM/10ML suspension 1 g, 1 g, Oral, TID AC, Rene Maloney MD, 1 g at 03/28/24 1050    tamsulosin (FLOMAX) 24 hr capsule 0.4 mg, 0.4 mg, Oral, Daily, Rene Maloney MD, 0.4 mg at 03/28/24 1050    timolol (TIMOPTIC) 0.25 % ophthalmic solution 1 drop, 1 drop, Both Eyes, Q12H, Rene Maloney MD    vitamin D3 capsule 5,000 Units, 5,000 Units, Oral, Daily, Rene Maloney MD, 5,000 Units at 03/28/24 1052    zinc sulfate (ZINCATE) capsule 220 mg, 220 mg, Oral, Daily, Rene Maloney MD, 220 mg at 03/28/24 1052    Past Medical History:   Diagnosis Date    Arthritis     Autonomic disease     CAD (coronary artery disease) 02/06/2017    Cervical radiculopathy 09/16/2021    Chronic constipation with acute exaccerbation 05/10/2021    Coronary artery disease     Degeneration of cervical intervertebral disc 08/11/2021    Diabetes mellitus     Diabetic foot ulcer 08/31/2020    Diabetic polyneuropathy associated with type 2 diabetes mellitus 01/18/2021    Elevated cholesterol     Gastroesophageal reflux disease 05/13/2019    Headache     HTN (hypertension), benign 05/03/2017    Hyperlipidemia      Hypertension     Mixed hyperlipidemia 02/07/2017    Multiple lung nodules 01/26/2020    5mm, 9 mm RLL identified 1/2020, not present 10/2019.    Myocardial infarction     Osteomyelitis 01/22/2020    Osteomyelitis of fifth toe of right foot 10/07/2019    Pancreatitis     Persistent insomnia 01/20/2020    Renal disorder     Sleep apnea 02/06/2017    Sleep apnea with use of continuous positive airway pressure (CPAP)     NON-COMPLIANT    Slow transit constipation 01/16/2019    Spinal stenosis in cervical region 09/16/2021    Vitamin D deficiency 03/02/2021       Past Surgical History:   Procedure Laterality Date    ABDOMINAL SURGERY      AMPUTATION FOOT / TOE Left 10/2021    5th digit     ANTERIOR CERVICAL DISCECTOMY W/ FUSION N/A 8/5/2022    Procedure: CERVICAL DISCECTOMY ANTERIOR WITH FUSION C5-6 with possible plating of C5-7 with neuromonitoring and 1 c-arm;  Surgeon: Karel Soliz MD;  Location:  [facility] OR;  Service: Neurosurgery;  Laterality: N/A;    APPENDECTOMY      BACK SURGERY      CARDIAC CATHETERIZATION Left 02/08/2021    Procedure: Left Heart Cath w poss intervention left anatomical snuff box acess;  Surgeon: Omkar Charles DO;  Location:  [facility] CATH INVASIVE LOCATION;  Service: Cardiology;  Laterality: Left;    CARDIAC SURGERY      CATARACT EXTRACTION      CERVICAL SPINE SURGERY      COLONOSCOPY N/A 01/31/2017    Normal exam repeat in 5 years    COLONOSCOPY N/A 02/11/2019    Mild acute inflammation    COLONOSCOPY W/ POLYPECTOMY  03/04/2014    Hyperplastic polyp    CORONARY ARTERY BYPASS GRAFT  10/2015    ENDOSCOPY  04/13/2011    Gastritis with hemorrhage    ENDOSCOPY N/A 05/05/2017    Normal exam    ENDOSCOPY N/A 02/11/2019    Gastritis    ENDOSCOPY N/A 09/01/2020    Non-erosive gastritis with hemorrhage    ENDOSCOPY N/A 02/10/2021    Esophagitis    FOOT SURGERY Left     INCISION AND DRAINAGE OF WOUND Left 09/2007    spider bite       Social History     Socioeconomic History    Marital  "status:    Tobacco Use    Smoking status: Former     Current packs/day: 0.00     Types: Cigarettes     Quit date:      Years since quittin.2    Smokeless tobacco: Never    Tobacco comments:     smoked in highschool   Vaping Use    Vaping status: Never Used   Substance and Sexual Activity    Alcohol use: No    Drug use: No    Sexual activity: Defer       Family History   Problem Relation Age of Onset    Colon cancer Father     Heart disease Father     Colon cancer Sister     Colon polyps Sister     Alzheimer's disease Mother     Coronary artery disease Sister     Coronary artery disease Sister        /66 (BP Location: Left arm, Patient Position: Lying)   Pulse 86   Temp 98.8 °F (37.1 °C) (Axillary)   Resp 18   Ht 182.9 cm (72\")   Wt 132 kg (290 lb 2 oz)   SpO2 100%   BMI 39.35 kg/m²     Physical Exam  In general the patient does seem to be more oriented than when present on admission.  He is not agitated or combative at this time.  The abdomen is soft nontender nondistended.  The bladder is not palpably distended.  There is no CVA tenderness over the kidneys.        Lab Results   Component Value Date    GLUCOSE 79 2024    BUN 36 (H) 2024    CREATININE 2.03 (H) 2024    EGFRIFNONA 18 (L) 2022    EGFRIFAFRI 30 (L) 2022    BCR 17.7 2024    CO2 22.0 2024    CALCIUM 9.2 2024    ALBUMIN 3.7 2024    AST 13 2024    ALT 8 2024     Lab Results   Component Value Date    GLUCOSE 79 2024    CALCIUM 9.2 2024     2024    K 3.6 2024    CO2 22.0 2024     2024    BUN 36 (H) 2024    CREATININE 2.03 (H) 2024    EGFRIFAFRI 30 (L) 2022    EGFRIFNONA 18 (L) 2022    BCR 17.7 2024    ANIONGAP 16.0 (H) 2024     Lab Results   Component Value Date    WBC 9.45 2024    HGB 13.7 2024    HCT 41.1 2024    MCV 85.8 2024     2024     Lab " "Results   Component Value Date    PSA 0.354 02/22/2023    PSA 0.296 10/18/2022    PSA 0.167 12/29/2021     No results found for: \"URINECX\"  Brief Urine Lab Results  (Last result in the past 365 days)        Color   Clarity   Blood   Leuk Est   Nitrite   Protein   CREAT   Urine HCG        03/26/24 1434 Yellow   Clear   Small (1+)   Negative   Negative   >=300 mg/dL (3+)                   Imaging Results (Last 7 Days)       Procedure Component Value Units Date/Time    XR Foot 3+ View Left [[id number]] Collected: 03/26/24 1723     Updated: 03/26/24 1728    Narrative:      EXAMINATION: XR FOOT 3+ VW LEFT-     3/26/2024 4:19 PM     HISTORY: foot infection     3 view LEFT foot exam.     Previous amputation of the mid/distal fifth metatarsal.     Interval resection or resorption of the fourth metatarsal head.     No bone destruction or soft tissue air is seen.     High-grade degenerative disease at the first metatarsal phalangeal  joint.     Unchanged appearance of prominent dorsal midfoot spurring and prominent  inferior calcaneal spurring.     Mild soft tissue edema over the dorsum of the foot is less prominent as  compared with the previous exam.       Impression:      1. No bone destruction or soft tissue air is seen.  2. Prominent degenerative spurring.           This report was signed and finalized on 3/26/2024 5:25 PM by Dr. Gomez Mcgill MD.       XR Chest 1 View [[id number]] Collected: 03/26/24 1433     Updated: 03/26/24 1437    Narrative:      EXAM: XR CHEST 1 VW-      DATE: 3/26/2024 1:15 PM     HISTORY: ams       COMPARISON: 8/28/2023.     TECHNIQUE:  Frontal view(s) of the chest submitted.     FINDINGS:    Patient is status post median sternotomy and CABG. There is enlargement  of the cardiac silhouette. There are low lung volumes with vascular  crowding versus mild volume overload. No effusion or pneumothorax is  seen.          Impression:         1. Postoperative chest and low lung volumes with vascular " "crowding  versus mild volume overload.     This report was signed and finalized on 3/26/2024 2:34 PM by Eran Christina.       CT Head Without Contrast [[id number]] Collected: 03/26/24 1408     Updated: 03/26/24 1413    Narrative:      EXAM: CT HEAD WO CONTRAST-      DATE: 3/26/2024 1:03 PM     HISTORY: ams       COMPARISON: 10/10/2023.     DOSE LENGTH PRODUCT: 876.8 mGy.cm  Automated exposure control was also  utilized to decrease patient radiation dose.     TECHNIQUE: Unenhanced CT images obtained from vertex to skull base with  multiplanar reformats.     FINDINGS:  There is no acute intracranial hemorrhage, midline shift, mass effect,  or hydrocephalus. There is no CT evidence for acute infarct.     There are chronic changes with volume loss and chronic small vessel  ischemic change of the periventricular white matter.      SOFT TISSUES: The scalp soft tissues are unremarkable.        SINUS: There is partially imaged mucosal thickening at the right  maxillary sinus.     ORBITS: The visualized orbits and globes are unremarkable. There is  bilateral lens extraction.          Impression:      1. Chronic changes and no acute intracranial findings.      This report was signed and finalized on 3/26/2024 2:10 PM by Eran Christina.                 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  This could potentially lead to elevated residuals which are chronic in nature.  He has not received ongoing urologic follow-up.    I really have nothing to offer at this time.  Certainly this situation can be worsened by the severity of his acute illness.  I would continue treating him for the active infection.  I am " concerned this gentleman will not be able to avoid lying flat in bed and will probably benefit from catheterization for at least a few more days.  Obviously he cannot get up and go to the bathroom when he wants to do this.    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  However, improvement in his overall general medical condition may provide adequate enough voiding that we can keep the catheter out of him long-term.  This would be managed as an outpatient.        (Please note that portions of this note were completed with a voice recognition program.)  Ritchie Arechiga MD  03/28/24  13:34 CDT

## 2024-03-29 ENCOUNTER — APPOINTMENT (OUTPATIENT)
Dept: MRI IMAGING | Facility: HOSPITAL | Age: 69
DRG: 871 | End: 2024-03-29
Payer: COMMERCIAL

## 2024-03-29 LAB
ALBUMIN SERPL-MCNC: 3.1 G/DL (ref 3.5–5.2)
ALBUMIN UR-MCNC: 74.5 MG/DL
ALBUMIN/GLOB SERPL: 1.1 G/DL
ALP SERPL-CCNC: 81 U/L (ref 39–117)
ALT SERPL W P-5'-P-CCNC: 17 U/L (ref 1–41)
ANION GAP SERPL CALCULATED.3IONS-SCNC: 12 MMOL/L (ref 5–15)
AST SERPL-CCNC: 32 U/L (ref 1–40)
BACTERIA SPEC AEROBE CULT: ABNORMAL
BACTERIA SPEC AEROBE CULT: ABNORMAL
BASOPHILS # BLD AUTO: 0.03 10*3/MM3 (ref 0–0.2)
BASOPHILS NFR BLD AUTO: 0.5 % (ref 0–1.5)
BILIRUB SERPL-MCNC: 0.7 MG/DL (ref 0–1.2)
BUN SERPL-MCNC: 40 MG/DL (ref 8–23)
BUN/CREAT SERPL: 19.3 (ref 7–25)
CALCIUM SPEC-SCNC: 8.4 MG/DL (ref 8.6–10.5)
CHLORIDE SERPL-SCNC: 103 MMOL/L (ref 98–107)
CO2 SERPL-SCNC: 23 MMOL/L (ref 22–29)
CREAT SERPL-MCNC: 2.07 MG/DL (ref 0.76–1.27)
CREAT UR-MCNC: 120.7 MG/DL
DEPRECATED RDW RBC AUTO: 45.8 FL (ref 37–54)
EGFRCR SERPLBLD CKD-EPI 2021: 34.5 ML/MIN/1.73
EOSINOPHIL # BLD AUTO: 0.09 10*3/MM3 (ref 0–0.4)
EOSINOPHIL NFR BLD AUTO: 1.5 % (ref 0.3–6.2)
ERYTHROCYTE [DISTWIDTH] IN BLOOD BY AUTOMATED COUNT: 14.6 % (ref 12.3–15.4)
GLOBULIN UR ELPH-MCNC: 2.8 GM/DL
GLUCOSE BLDC GLUCOMTR-MCNC: 110 MG/DL (ref 70–130)
GLUCOSE BLDC GLUCOMTR-MCNC: 114 MG/DL (ref 70–130)
GLUCOSE BLDC GLUCOMTR-MCNC: 129 MG/DL (ref 70–130)
GLUCOSE BLDC GLUCOMTR-MCNC: 174 MG/DL (ref 70–130)
GLUCOSE SERPL-MCNC: 120 MG/DL (ref 65–99)
GRAM STN SPEC: ABNORMAL
HCT VFR BLD AUTO: 33.6 % (ref 37.5–51)
HGB BLD-MCNC: 11.1 G/DL (ref 13–17.7)
IMM GRANULOCYTES # BLD AUTO: 0.04 10*3/MM3 (ref 0–0.05)
IMM GRANULOCYTES NFR BLD AUTO: 0.7 % (ref 0–0.5)
ISOLATED FROM: ABNORMAL
ISOLATED FROM: ABNORMAL
LYMPHOCYTES # BLD AUTO: 0.94 10*3/MM3 (ref 0.7–3.1)
LYMPHOCYTES NFR BLD AUTO: 15.5 % (ref 19.6–45.3)
MCH RBC QN AUTO: 28.5 PG (ref 26.6–33)
MCHC RBC AUTO-ENTMCNC: 33 G/DL (ref 31.5–35.7)
MCV RBC AUTO: 86.2 FL (ref 79–97)
MICROALBUMIN/CREAT UR: 617.2 MG/G (ref 0–29)
MONOCYTES # BLD AUTO: 0.67 10*3/MM3 (ref 0.1–0.9)
MONOCYTES NFR BLD AUTO: 11 % (ref 5–12)
NEUTROPHILS NFR BLD AUTO: 4.31 10*3/MM3 (ref 1.7–7)
NEUTROPHILS NFR BLD AUTO: 70.8 % (ref 42.7–76)
NRBC BLD AUTO-RTO: 0 /100 WBC (ref 0–0.2)
PLATELET # BLD AUTO: 156 10*3/MM3 (ref 140–450)
PMV BLD AUTO: 11.8 FL (ref 6–12)
POTASSIUM SERPL-SCNC: 3.3 MMOL/L (ref 3.5–5.2)
PROT SERPL-MCNC: 5.9 G/DL (ref 6–8.5)
RBC # BLD AUTO: 3.9 10*6/MM3 (ref 4.14–5.8)
SODIUM SERPL-SCNC: 138 MMOL/L (ref 136–145)
WBC NRBC COR # BLD AUTO: 6.08 10*3/MM3 (ref 3.4–10.8)

## 2024-03-29 PROCEDURE — 0 GADOBENATE DIMEGLUMINE 529 MG/ML SOLUTION: Performed by: INTERNAL MEDICINE

## 2024-03-29 PROCEDURE — 87186 SC STD MICRODIL/AGAR DIL: CPT | Performed by: INTERNAL MEDICINE

## 2024-03-29 PROCEDURE — 87154 CUL TYP ID BLD PTHGN 6+ TRGT: CPT | Performed by: INTERNAL MEDICINE

## 2024-03-29 PROCEDURE — 63710000001 INSULIN REGULAR HUMAN PER 5 UNITS: Performed by: EMERGENCY MEDICINE

## 2024-03-29 PROCEDURE — 82948 REAGENT STRIP/BLOOD GLUCOSE: CPT

## 2024-03-29 PROCEDURE — 63710000001 INSULIN DETEMIR PER 5 UNITS: Performed by: NURSE PRACTITIONER

## 2024-03-29 PROCEDURE — 73720 MRI LWR EXTREMITY W/O&W/DYE: CPT

## 2024-03-29 PROCEDURE — 87040 BLOOD CULTURE FOR BACTERIA: CPT | Performed by: INTERNAL MEDICINE

## 2024-03-29 PROCEDURE — 25010000002 LORAZEPAM PER 2 MG: Performed by: INTERNAL MEDICINE

## 2024-03-29 PROCEDURE — 25010000002 LINEZOLID 600 MG/300ML SOLUTION: Performed by: NURSE PRACTITIONER

## 2024-03-29 PROCEDURE — 25010000002 ONDANSETRON PER 1 MG: Performed by: NURSE PRACTITIONER

## 2024-03-29 PROCEDURE — 85025 COMPLETE CBC W/AUTO DIFF WBC: CPT | Performed by: INTERNAL MEDICINE

## 2024-03-29 PROCEDURE — A9577 INJ MULTIHANCE: HCPCS | Performed by: INTERNAL MEDICINE

## 2024-03-29 PROCEDURE — 97165 OT EVAL LOW COMPLEX 30 MIN: CPT

## 2024-03-29 PROCEDURE — 97162 PT EVAL MOD COMPLEX 30 MIN: CPT

## 2024-03-29 PROCEDURE — 99231 SBSQ HOSP IP/OBS SF/LOW 25: CPT | Performed by: NURSE PRACTITIONER

## 2024-03-29 PROCEDURE — 87147 CULTURE TYPE IMMUNOLOGIC: CPT | Performed by: INTERNAL MEDICINE

## 2024-03-29 PROCEDURE — 99232 SBSQ HOSP IP/OBS MODERATE 35: CPT | Performed by: INTERNAL MEDICINE

## 2024-03-29 PROCEDURE — 80053 COMPREHEN METABOLIC PANEL: CPT | Performed by: INTERNAL MEDICINE

## 2024-03-29 RX ADMIN — SUCRALFATE 1 G: 1 SUSPENSION ORAL at 08:27

## 2024-03-29 RX ADMIN — FAMOTIDINE 20 MG: 20 TABLET, FILM COATED ORAL at 18:34

## 2024-03-29 RX ADMIN — Medication 6 MG: at 20:36

## 2024-03-29 RX ADMIN — TAMSULOSIN HYDROCHLORIDE 0.4 MG: 0.4 CAPSULE ORAL at 08:26

## 2024-03-29 RX ADMIN — DONEPEZIL HYDROCHLORIDE 10 MG: 10 TABLET, FILM COATED ORAL at 08:26

## 2024-03-29 RX ADMIN — LINEZOLID 600 MG: 600 INJECTION, SOLUTION INTRAVENOUS at 12:25

## 2024-03-29 RX ADMIN — Medication 1 APPLICATION: at 08:26

## 2024-03-29 RX ADMIN — BUMETANIDE 1 MG: 1 TABLET ORAL at 08:26

## 2024-03-29 RX ADMIN — ZINC SULFATE 220 MG (50 MG) CAPSULE 220 MG: CAPSULE at 08:27

## 2024-03-29 RX ADMIN — MIDODRINE HYDROCHLORIDE 2.5 MG: 2.5 TABLET ORAL at 18:34

## 2024-03-29 RX ADMIN — LINEZOLID 600 MG: 600 INJECTION, SOLUTION INTRAVENOUS at 00:20

## 2024-03-29 RX ADMIN — APIXABAN 5 MG: 5 TABLET, FILM COATED ORAL at 08:26

## 2024-03-29 RX ADMIN — INSULIN HUMAN 10 UNITS: 100 INJECTION, SOLUTION PARENTERAL at 12:25

## 2024-03-29 RX ADMIN — PREGABALIN 50 MG: 50 CAPSULE ORAL at 08:26

## 2024-03-29 RX ADMIN — SUCRALFATE 1 G: 1 SUSPENSION ORAL at 12:25

## 2024-03-29 RX ADMIN — GADOBENATE DIMEGLUMINE 20 ML: 529 INJECTION, SOLUTION INTRAVENOUS at 18:07

## 2024-03-29 RX ADMIN — CALCITRIOL 0.5 MCG: 0.25 CAPSULE ORAL at 08:26

## 2024-03-29 RX ADMIN — MIDODRINE HYDROCHLORIDE 2.5 MG: 2.5 TABLET ORAL at 12:25

## 2024-03-29 RX ADMIN — OXYCODONE HYDROCHLORIDE AND ACETAMINOPHEN 1000 MG: 500 TABLET ORAL at 08:26

## 2024-03-29 RX ADMIN — ONDANSETRON 4 MG: 2 INJECTION INTRAMUSCULAR; INTRAVENOUS at 18:37

## 2024-03-29 RX ADMIN — Medication 1 APPLICATION: at 20:35

## 2024-03-29 RX ADMIN — APIXABAN 5 MG: 5 TABLET, FILM COATED ORAL at 20:35

## 2024-03-29 RX ADMIN — TIMOLOL MALEATE 1 DROP: 2.5 SOLUTION/ DROPS OPHTHALMIC at 08:28

## 2024-03-29 RX ADMIN — SUCRALFATE 1 G: 1 SUSPENSION ORAL at 18:34

## 2024-03-29 RX ADMIN — ISOSORBIDE MONONITRATE 30 MG: 30 TABLET, EXTENDED RELEASE ORAL at 08:26

## 2024-03-29 RX ADMIN — PREGABALIN 100 MG: 100 CAPSULE ORAL at 20:36

## 2024-03-29 RX ADMIN — Medication 10 ML: at 08:28

## 2024-03-29 RX ADMIN — INSULIN DETEMIR 30 UNITS: 100 INJECTION, SOLUTION SUBCUTANEOUS at 20:56

## 2024-03-29 RX ADMIN — Medication 10 ML: at 20:36

## 2024-03-29 RX ADMIN — ONDANSETRON 4 MG: 2 INJECTION INTRAMUSCULAR; INTRAVENOUS at 11:23

## 2024-03-29 RX ADMIN — ROSUVASTATIN CALCIUM 10 MG: 10 TABLET, FILM COATED ORAL at 20:36

## 2024-03-29 RX ADMIN — ASPIRIN 81 MG: 81 TABLET, COATED ORAL at 08:26

## 2024-03-29 RX ADMIN — INSULIN HUMAN 10 UNITS: 100 INJECTION, SOLUTION PARENTERAL at 08:26

## 2024-03-29 RX ADMIN — LORAZEPAM 1 MG: 2 INJECTION INTRAMUSCULAR; INTRAVENOUS at 01:40

## 2024-03-29 RX ADMIN — INSULIN HUMAN 10 UNITS: 100 INJECTION, SOLUTION PARENTERAL at 18:34

## 2024-03-29 RX ADMIN — Medication 1 APPLICATION: at 00:20

## 2024-03-29 RX ADMIN — FAMOTIDINE 20 MG: 20 TABLET, FILM COATED ORAL at 08:26

## 2024-03-29 RX ADMIN — Medication 5000 UNITS: at 08:26

## 2024-03-29 RX ADMIN — MIDODRINE HYDROCHLORIDE 2.5 MG: 2.5 TABLET ORAL at 08:26

## 2024-03-29 RX ADMIN — OXYCODONE HYDROCHLORIDE 10 MG: 5 TABLET ORAL at 12:29

## 2024-03-29 RX ADMIN — TIMOLOL MALEATE 1 DROP: 2.5 SOLUTION/ DROPS OPHTHALMIC at 20:36

## 2024-03-29 NOTE — CONSULTS
[facility] - PODIATRY    Today's Date: 03/29/24     Patient Name: Erick Luong  MRN: [id number]  CSN: [id number]  PCP: Del Shetty MD  Referring Provider: No ref. provider found  Attending Provider: Rene Maloney MD  Length of Stay: 3    SUBJECTIVE   Chief Complaint: Left foot wounds    HPI: Erick Luong, a 68 y.o.male, who was admitted on 3/26/2024 and podiatry consult was made for left foot wounds.  Patient alert today and denies pain in left foot.  Patient reports he has not had an MRI of his left foot performed yet.     Past Medical History:   Diagnosis Date    Arthritis     Autonomic disease     CAD (coronary artery disease) 02/06/2017    Cervical radiculopathy 09/16/2021    Chronic constipation with acute exaccerbation 05/10/2021    Coronary artery disease     Degeneration of cervical intervertebral disc 08/11/2021    Diabetes mellitus     Diabetic foot ulcer 08/31/2020    Diabetic polyneuropathy associated with type 2 diabetes mellitus 01/18/2021    Elevated cholesterol     Gastroesophageal reflux disease 05/13/2019    Headache     HTN (hypertension), benign 05/03/2017    Hyperlipidemia     Hypertension     Mixed hyperlipidemia 02/07/2017    Multiple lung nodules 01/26/2020    5mm, 9 mm RLL identified 1/2020, not present 10/2019.    Myocardial infarction     Osteomyelitis 01/22/2020    Osteomyelitis of fifth toe of right foot 10/07/2019    Pancreatitis     Persistent insomnia 01/20/2020    Renal disorder     Sleep apnea 02/06/2017    Sleep apnea with use of continuous positive airway pressure (CPAP)     NON-COMPLIANT    Slow transit constipation 01/16/2019    Spinal stenosis in cervical region 09/16/2021    Vitamin D deficiency 03/02/2021     Past Surgical History:   Procedure Laterality Date    ABDOMINAL SURGERY      AMPUTATION FOOT / TOE Left 10/2021    5th digit     ANTERIOR CERVICAL DISCECTOMY W/ FUSION N/A 8/5/2022    Procedure: CERVICAL DISCECTOMY ANTERIOR WITH FUSION C5-6  "with possible plating of C5-7 with neuromonitoring and 1 c-arm;  Surgeon: Karel Soliz MD;  Location:  [facility] OR;  Service: Neurosurgery;  Laterality: N/A;    APPENDECTOMY      BACK SURGERY      CARDIAC CATHETERIZATION Left 2021    Procedure: Left Heart Cath w poss intervention left anatomical snuff box acess;  Surgeon: Omkar Charles DO;  Location:  [facility] CATH INVASIVE LOCATION;  Service: Cardiology;  Laterality: Left;    CARDIAC SURGERY      CATARACT EXTRACTION      CERVICAL SPINE SURGERY      COLONOSCOPY N/A 2017    Normal exam repeat in 5 years    COLONOSCOPY N/A 2019    Mild acute inflammation    COLONOSCOPY W/ POLYPECTOMY  2014    Hyperplastic polyp    CORONARY ARTERY BYPASS GRAFT  10/2015    ENDOSCOPY  2011    Gastritis with hemorrhage    ENDOSCOPY N/A 2017    Normal exam    ENDOSCOPY N/A 2019    Gastritis    ENDOSCOPY N/A 2020    Non-erosive gastritis with hemorrhage    ENDOSCOPY N/A 02/10/2021    Esophagitis    FOOT SURGERY Left     INCISION AND DRAINAGE OF WOUND Left 2007    spider bite     Family History   Problem Relation Age of Onset    Colon cancer Father     Heart disease Father     Colon cancer Sister     Colon polyps Sister     Alzheimer's disease Mother     Coronary artery disease Sister     Coronary artery disease Sister      Social History     Socioeconomic History    Marital status:    Tobacco Use    Smoking status: Former     Current packs/day: 0.00     Types: Cigarettes     Quit date:      Years since quittin.2    Smokeless tobacco: Never    Tobacco comments:     smoked in highschool   Vaping Use    Vaping status: Never Used   Substance and Sexual Activity    Alcohol use: No    Drug use: No    Sexual activity: Defer     Allergies   Allergen Reactions    Cefepime Hives and Anaphylaxis    Bactrim [Sulfamethoxazole-Trimethoprim] Other (See Comments)     \"RENAL FAILURE\"    Vancomycin Itching    Zolpidem Unknown - " "High Severity     \"makes him crazy\"    Zolpidem Tartrate Unknown - Low Severity and Provider Review Needed    Metronidazole Rash     Current Facility-Administered Medications   Medication Dose Route Frequency Provider Last Rate Last Admin    acetaminophen (TYLENOL) tablet 650 mg  650 mg Oral Q4H PRN Raquel Duncan APRN   650 mg at 03/27/24 1005    Or    acetaminophen (TYLENOL) 160 MG/5ML oral solution 650 mg  650 mg Oral Q4H PRN Raquel Duncan APRN   650 mg at 03/27/24 2109    Or    acetaminophen (TYLENOL) suppository 650 mg  650 mg Rectal Q4H PRN Raquel Duncan APRN        apixaban (ELIQUIS) tablet 5 mg  5 mg Oral Q12H Raquel Duncan APRN   5 mg at 03/29/24 0826    ascorbic acid (VITAMIN C) tablet 1,000 mg  1,000 mg Oral Daily Rene Maloney MD   1,000 mg at 03/29/24 0826    aspirin EC tablet 81 mg  81 mg Oral Daily Rene Maloney MD   81 mg at 03/29/24 0826    sennosides-docusate (PERICOLACE) 8.6-50 MG per tablet 1 tablet  1 tablet Oral BID Raquel Duncan APRN   1 tablet at 03/28/24 2126    And    polyethylene glycol (MIRALAX) packet 17 g  17 g Oral Daily PRN Raquel Duncan APRN        And    bisacodyl (DULCOLAX) EC tablet 5 mg  5 mg Oral Daily PRN Raquel Duncan APRN        And    bisacodyl (DULCOLAX) suppository 10 mg  10 mg Rectal Daily PRN Raquel Duncan APRN        bumetanide (BUMEX) tablet 1 mg  1 mg Oral Daily Raquel Duncan APRN   1 mg at 03/29/24 0826    calcitriol (ROCALTROL) capsule 0.5 mcg  0.5 mcg Oral Daily Rene Maloney MD   0.5 mcg at 03/29/24 0826    carvedilol (COREG) tablet 3.125 mg  3.125 mg Oral BID With Meals Rene Maloney MD   3.125 mg at 03/28/24 1836    dextrose (D50W) (25 g/50 mL) IV injection 25 g  25 g Intravenous Q15 Min PRN Raquel Duncan, APRN        dextrose (GLUTOSE) oral gel 15 g  15 g Oral Q15 Min PRN Raquel Duncan APRN   15 g at 03/27/24 1710    Diclofenac Sodium (VOLTAREN) 1 % gel 2 g  2 g Topical 4x Daily PRN " Rene Maloney MD        donepezil (ARICEPT) tablet 10 mg  10 mg Oral Daily Raquel Duncan, SUSAN   10 mg at 03/29/24 0826    famotidine (PEPCID) tablet 20 mg  20 mg Oral BID AC Rene Maloney MD   20 mg at 03/29/24 0826    glucagon (GLUCAGEN) injection 1 mg  1 mg Intramuscular Q15 Min PRN Raquel Duncan, SUSAN        haloperidol lactate (HALDOL) injection 5 mg  5 mg Intravenous Q6H PRN Rene Maloney MD        insulin detemir (LEVEMIR) injection 30 Units  30 Units Subcutaneous Nightly Raquel Duncan APRN   30 Units at 03/28/24 2125    Insulin Lispro (humaLOG) injection 4-24 Units  4-24 Units Subcutaneous 4x Daily AC & at Bedtime Raquel Duncan APRN   4 Units at 03/28/24 1838    insulin regular (humuLIN R,novoLIN R) injection 10 Units  10 Units Subcutaneous TID AC Steve De Jesus MD   10 Units at 03/29/24 1225    isosorbide mononitrate (IMDUR) 24 hr tablet 30 mg  30 mg Oral Q24H Rene Maloney MD   30 mg at 03/29/24 0826    Linezolid (ZYVOX) 600 mg 300 mL  600 mg Intravenous Q12H Raquel Duncan, SUSAN 300 mL/hr at 03/29/24 1225 600 mg at 03/29/24 1225    LORazepam (ATIVAN) injection 1 mg  1 mg Intravenous Q4H PRN Rene Maloney MD   1 mg at 03/29/24 0140    magnesium hydroxide (MILK OF MAGNESIA) suspension 10 mL  10 mL Oral Daily PRN Rnee Maloney MD        melatonin tablet 6 mg  6 mg Oral Nightly Rene Maloney MD   6 mg at 03/28/24 2126    midodrine (PROAMATINE) tablet 2.5 mg  2.5 mg Oral TID AC Rene Maloney MD   2.5 mg at 03/29/24 1225    mupirocin (BACTROBAN) 2 % nasal ointment 1 Application  1 Application Each Nare BID Nadia Polo APRN   1 Application at 03/29/24 0826    nitroglycerin (NITROSTAT) SL tablet 0.4 mg  0.4 mg Sublingual Q5 Min PRN Raquel Duncan APRN        ondansetron ODT (ZOFRAN-ODT) disintegrating tablet 4 mg  4 mg Oral Q6H PRN Raquel Duncan APRN        Or    ondansetron (ZOFRAN) injection 4 mg  4 mg Intravenous Q6H  PRN Raquel Duncan APRN   4 mg at 03/29/24 1123    oxyCODONE (ROXICODONE) immediate release tablet 10 mg  10 mg Oral BID PRN Raquel Duncan APRN   10 mg at 03/29/24 1229    pregabalin (LYRICA) capsule 100 mg  100 mg Oral Nightly Rene Mlaoney MD   100 mg at 03/28/24 2126    pregabalin (LYRICA) capsule 50 mg  50 mg Oral Daily Rene Maloney MD   50 mg at 03/29/24 0826    rosuvastatin (CRESTOR) tablet 10 mg  10 mg Oral Nightly Rene Maloney MD   10 mg at 03/28/24 2126    sodium chloride 0.9 % flush 10 mL  10 mL Intravenous PRN Steve De Jesus MD        sodium chloride 0.9 % flush 10 mL  10 mL Intravenous Q12H Raquel Duncan APRN   10 mL at 03/29/24 0828    sodium chloride 0.9 % flush 10 mL  10 mL Intravenous PRN Raquel Duncan APRN        sodium chloride 0.9 % infusion 40 mL  40 mL Intravenous PRN Raquel Duncan APRN        sucralfate (CARAFATE) 1 GM/10ML suspension 1 g  1 g Oral TID AC Rene Maloney MD   1 g at 03/29/24 1225    tamsulosin (FLOMAX) 24 hr capsule 0.4 mg  0.4 mg Oral Daily Rene Maloney MD   0.4 mg at 03/29/24 0826    timolol (TIMOPTIC) 0.25 % ophthalmic solution 1 drop  1 drop Both Eyes Q12H Rene Maloney MD   1 drop at 03/29/24 0828    vitamin D3 capsule 5,000 Units  5,000 Units Oral Daily Rene Maloney MD   5,000 Units at 03/29/24 0826    zinc sulfate (ZINCATE) capsule 220 mg  220 mg Oral Daily Rene Maloney MD   220 mg at 03/29/24 0827     Review of Systems   Constitutional:  Negative for activity change.   HENT:  Negative for congestion.    Respiratory:  Negative for apnea and shortness of breath.    Gastrointestinal:  Negative for abdominal pain.   Musculoskeletal:  Positive for arthralgias. Negative for back pain.   Skin:  Positive for wound.   Neurological:  Positive for numbness.   Psychiatric/Behavioral:  Negative for agitation, behavioral problems and confusion.        OBJECTIVE     Vitals:    03/29/24 1125   BP: 94/50    Pulse: 79   Resp: 16   Temp: 99.3 °F (37.4 °C)   SpO2: 95%       PHYSICAL EXAM  GEN:   Pt presents in hospital bed. Accompanied by none.     Foot/Ankle Exam    GENERAL  Appearance:  appears stated age  Orientation:  AAOx3  Affect:  appropriate    VASCULAR     Right Foot Vascularity   Dorsalis pedis:  2+  Posterior tibial:  2+  Skin temperature:  warm  Edema grading:  Trace  CFT:  3  Varicosities:  none     Left Foot Vascularity   Posterior tibial:  2+  Skin temperature:  warm  Edema grading:  Trace  CFT:  3  Pedal hair growth:  Absent  Varicosities:  none     NEUROLOGIC     Right Foot Neurologic   Light touch sensation: diminished  Vibratory sensation: diminished  Hot/Cold sensation: diminished     Left Foot Neurologic   Light touch sensation: diminished  Vibratory sensation: diminished    MUSCULOSKELETAL     Right Foot Musculoskeletal   Tenderness:  none       Left Foot Musculoskeletal    Amputation   Toes amputated: fifth toe  Tenderness:  none    MUSCLE STRENGTH     Right Foot Muscle Strength   Foot dorsiflexion:  5  Foot plantar flexion:  5  Foot inversion:  5  Foot eversion:  5     Left Foot Muscle Strength   Foot dorsiflexion:  4+  Foot plantar flexion:  4+  Foot inversion:  4+  Foot eversion:  4+    DERMATOLOGIC        Left Foot Dermatologic   Skin  Positive for wound.      Left foot additional comments: Multiple wounds  See photos    Image:                                    RADIOLOGY/NUCLEAR:  XR Foot 3+ View Left    Result Date: 3/26/2024  Narrative: EXAMINATION: XR FOOT 3+ VW LEFT-  3/26/2024 4:19 PM  HISTORY: foot infection  3 view LEFT foot exam.  Previous amputation of the mid/distal fifth metatarsal.  Interval resection or resorption of the fourth metatarsal head.  No bone destruction or soft tissue air is seen.  High-grade degenerative disease at the first metatarsal phalangeal joint.  Unchanged appearance of prominent dorsal midfoot spurring and prominent inferior calcaneal spurring.  Mild soft  tissue edema over the dorsum of the foot is less prominent as compared with the previous exam.      Impression: 1. No bone destruction or soft tissue air is seen. 2. Prominent degenerative spurring.    This report was signed and finalized on 3/26/2024 5:25 PM by Dr. Gomez Mcgill MD.      XR Chest 1 View    Result Date: 3/26/2024  Narrative: EXAM: XR CHEST 1 VW-  DATE: 3/26/2024 1:15 PM  HISTORY: ams   COMPARISON: 8/28/2023.  TECHNIQUE:  Frontal view(s) of the chest submitted.  FINDINGS:  Patient is status post median sternotomy and CABG. There is enlargement of the cardiac silhouette. There are low lung volumes with vascular crowding versus mild volume overload. No effusion or pneumothorax is seen.       Impression:  1. Postoperative chest and low lung volumes with vascular crowding versus mild volume overload.  This report was signed and finalized on 3/26/2024 2:34 PM by Eran Christina.      CT Head Without Contrast    Result Date: 3/26/2024  Narrative: EXAM: CT HEAD WO CONTRAST-  DATE: 3/26/2024 1:03 PM  HISTORY: ams   COMPARISON: 10/10/2023.  DOSE LENGTH PRODUCT: 876.8 mGy.cm  Automated exposure control was also utilized to decrease patient radiation dose.  TECHNIQUE: Unenhanced CT images obtained from vertex to skull base with multiplanar reformats.  FINDINGS: There is no acute intracranial hemorrhage, midline shift, mass effect, or hydrocephalus. There is no CT evidence for acute infarct.  There are chronic changes with volume loss and chronic small vessel ischemic change of the periventricular white matter.  SOFT TISSUES: The scalp soft tissues are unremarkable.   SINUS: There is partially imaged mucosal thickening at the right maxillary sinus.  ORBITS: The visualized orbits and globes are unremarkable. There is bilateral lens extraction.       Impression: 1. Chronic changes and no acute intracranial findings.  This report was signed and finalized on 3/26/2024 2:10 PM by Eran Christina.        LABORATORY/CULTURE RESULTS:  Results from last 7 days   Lab Units 03/29/24  0551 03/28/24  1004 03/27/24  0449   WBC 10*3/mm3 6.08 9.45 12.75*   HEMOGLOBIN g/dL 11.1* 13.7 11.8*   HEMATOCRIT % 33.6* 41.1 35.9*   PLATELETS 10*3/mm3 156 154 163     Results from last 7 days   Lab Units 03/29/24  0551 03/28/24  1004 03/27/24  0449 03/26/24  1326   SODIUM mmol/L 138 140 142 140   POTASSIUM mmol/L 3.3* 3.6 3.5 3.4*   CHLORIDE mmol/L 103 102 103 103   CO2 mmol/L 23.0 22.0 25.0 23.0   BUN mg/dL 40* 36* 29* 29*   CREATININE mg/dL 2.07* 2.03* 2.07* 1.93*   CALCIUM mg/dL 8.4* 9.2 8.9 9.5   BILIRUBIN mg/dL 0.7  --   --  0.7   ALK PHOS U/L 81  --   --  91   ALT (SGPT) U/L 17  --   --  8   AST (SGOT) U/L 32  --   --  13   GLUCOSE mg/dL 120* 79 169* 438*     Results from last 7 days   Lab Units 03/26/24  1326   INR  1.07     Microbiology Results (last 10 days)       Procedure Component Value - Date/Time    MRSA Screen, PCR (Inpatient) - Swab, Nares [[id number]]  (Abnormal) Collected: 03/28/24 2128    Lab Status: Final result Specimen: Swab from Nares Updated: 03/28/24 2239     MRSA PCR MRSA Detected    Narrative:      The negative predictive value of this diagnostic test is high and should only be used to consider de-escalating anti-MRSA therapy. A positive result may indicate colonization with MRSA and must be correlated clinically.    AMAN AURIS SCREEN - Swab, Axilla Right, Axilla Left and Groin [[id number]]  (Normal) Collected: 03/27/24 0351    Lab Status: Preliminary result Specimen: Swab from Axilla Right, Axilla Left and Groin Updated: 03/29/24 0416     Candida Auris Screen Culture No Candida auris isolated at 2 days    Respiratory Panel PCR w/COVID-19(SARS-CoV-2) [facility] In-House, NP Swab in UTM/VTM, 2 HR TAT - Swab, Nasopharynx [[id number]]  (Normal) Collected: 03/26/24 2532    Lab Status: Final result Specimen: Swab from Nasopharynx Updated: 03/26/24 7919     ADENOVIRUS, PCR Not Detected      Coronavirus 229E Not Detected     Coronavirus HKU1 Not Detected     Coronavirus NL63 Not Detected     Coronavirus OC43 Not Detected     COVID19 Not Detected     Human Metapneumovirus Not Detected     Human Rhinovirus/Enterovirus Not Detected     Influenza A PCR Not Detected     Influenza B PCR Not Detected     Parainfluenza Virus 1 Not Detected     Parainfluenza Virus 2 Not Detected     Parainfluenza Virus 3 Not Detected     Parainfluenza Virus 4 Not Detected     RSV, PCR Not Detected     Bordetella pertussis pcr Not Detected     Bordetella parapertussis PCR Not Detected     Chlamydophila pneumoniae PCR Not Detected     Mycoplasma pneumo by PCR Not Detected    Narrative:      In the setting of a positive respiratory panel with a viral infection PLUS a negative procalcitonin without other underlying concern for bacterial infection, consider observing off antibiotics or discontinuation of antibiotics and continue supportive care. If the respiratory panel is positive for atypical bacterial infection (Bordetella pertussis, Chlamydophila pneumoniae, or Mycoplasma pneumoniae), consider antibiotic de-escalation to target atypical bacterial infection.    Blood Culture - Blood, Arm, Right [[id number]]  (Abnormal) Collected: 03/26/24 1346    Lab Status: Final result Specimen: Blood from Arm, Right Updated: 03/29/24 0513     Blood Culture Staphylococcus aureus, MRSA     Comment:   Infectious disease consultation is highly recommended to rule out distant foci of infection.  Methicillin resistant Staphylococcus aureus, Patient may be an isolation risk.        Isolated from Aerobic and Anaerobic Bottles     Gram Stain Aerobic Bottle Gram positive cocci      Anaerobic Bottle Gram positive cocci    Blood Culture - Blood, Arm, Left [[id number]]  (Abnormal)  (Susceptibility) Collected: 03/26/24 1346    Lab Status: Final result Specimen: Blood from Arm, Left Updated: 03/29/24 0513     Blood Culture Staphylococcus aureus, MRSA      Comment:   Infectious disease consultation is highly recommended to rule out distant foci of infection.  Methicillin resistant Staphylococcus aureus, Patient may be an isolation risk.        Isolated from Aerobic and Anaerobic Bottles     Gram Stain Aerobic Bottle Gram positive cocci      Anaerobic Bottle Gram positive cocci    Susceptibility        Staphylococcus aureus, MRSA      MATT      Gentamicin Susceptible      Oxacillin Resistant      Rifampin Susceptible      Vancomycin Susceptible                       Susceptibility Comments       Staphylococcus aureus, MRSA    This isolate is presumed to be clindamycin resistant based on detection of inducible clindamycin resistance.  Clindamycin may still be effective in some patients.               Blood Culture ID, PCR - Blood, Arm, Left [[id number]]  (Abnormal) Collected: 03/26/24 1346    Lab Status: Final result Specimen: Blood from Arm, Left Updated: 03/27/24 0426     BCID, PCR Staph aureus. mecA/C and MREJ (methicillin resistance gene) detected. Identification by BCID2 PCR.     BOTTLE TYPE Aerobic Bottle    Narrative:      Infectious disease consultation is highly recommended to rule out distant foci of infection.            PATHOLOGY RESULTS:       ASSESSMENT/PLAN   Diabetic foot ulcerations to left foot  Type 2 diabetes mellitus with hyperglycemia  Diabetic polyneuropathy  Sepsis   Metabolic encephalopathy secondary to sepsis-improving    Patient may bear weight with CAM boot when out of bed.     Dressing changed done today at bedside. Continue wound care twice daily with Betadine soaked gauze wet-to-dry to be changed by nursing.    Awaiting MRI of left foot to be performed to determine presence of osteomyelitis or abscess.    Antibiotic therapy per infectious disease.    Will continue to follow patient while admitted.      This document has been electronically signed by SUSAN Perez on March 29, 2024 12:58 CDT

## 2024-03-29 NOTE — THERAPY EVALUATION
Acute Care - Occupational Therapy Initial Evaluation  [facility]     Patient Name: Erick Luong  : 1955  MRN: [id number]  Today's Date: 3/29/2024     Date of Referral to OT: 24       Admit Date: 3/26/2024       ICD-10-CM ICD-9-CM   1. Diabetic foot infection  E11.628 250.80    L08.9 686.9   2. Poorly controlled diabetes mellitus  E11.65 250.00   3. Impaired functional mobility and activity tolerance [Z74.09]  Z74.09 V49.89     Patient Active Problem List   Diagnosis    Obesity, unspecified obesity severity, unspecified obesity type    Essential hypertension    Type 2 diabetes mellitus with hyperglycemia, with long-term current use of insulin    Nonsmoker    Anemia due to chronic kidney disease    Class 3 severe obesity due to excess calories with body mass index (BMI) of 40.0 to 44.9 in adult    Anasarca    Sleep apnea with use of continuous positive airway pressure (CPAP)    Medically noncompliant    Diabetic ulcer of left midfoot associated with type 2 diabetes mellitus, with fat layer exposed    Diabetic polyneuropathy associated with type 2 diabetes mellitus    Spinal stenosis in cervical region    Degeneration of cervical intervertebral disc    Cervical radiculopathy    Degeneration of lumbar or lumbosacral intervertebral disc    Cervical myelopathy    Bilateral carpal tunnel syndrome    CAD (coronary artery disease)    GERD without esophagitis    BPH without obstruction/lower urinary tract symptoms    Stage 3b chronic kidney disease    Chronic diastolic heart failure    Type 2 myocardial infarction due to heart failure    Left carpal tunnel syndrome    Syncope and collapse, recurrent episodes    Poorly-controlled hypertension    Rhinovirus    Peripheral vascular disease    Chronic kidney disease (CKD), stage IV (severe)    Diabetic foot infection    Sepsis     Past Medical History:   Diagnosis Date    Arthritis     Autonomic disease     CAD (coronary artery disease) 2017    Cervical  radiculopathy 09/16/2021    Chronic constipation with acute exaccerbation 05/10/2021    Coronary artery disease     Degeneration of cervical intervertebral disc 08/11/2021    Diabetes mellitus     Diabetic foot ulcer 08/31/2020    Diabetic polyneuropathy associated with type 2 diabetes mellitus 01/18/2021    Elevated cholesterol     Gastroesophageal reflux disease 05/13/2019    Headache     HTN (hypertension), benign 05/03/2017    Hyperlipidemia     Hypertension     Mixed hyperlipidemia 02/07/2017    Multiple lung nodules 01/26/2020    5mm, 9 mm RLL identified 1/2020, not present 10/2019.    Myocardial infarction     Osteomyelitis 01/22/2020    Osteomyelitis of fifth toe of right foot 10/07/2019    Pancreatitis     Persistent insomnia 01/20/2020    Renal disorder     Sleep apnea 02/06/2017    Sleep apnea with use of continuous positive airway pressure (CPAP)     NON-COMPLIANT    Slow transit constipation 01/16/2019    Spinal stenosis in cervical region 09/16/2021    Vitamin D deficiency 03/02/2021     Past Surgical History:   Procedure Laterality Date    ABDOMINAL SURGERY      AMPUTATION FOOT / TOE Left 10/2021    5th digit     ANTERIOR CERVICAL DISCECTOMY W/ FUSION N/A 8/5/2022    Procedure: CERVICAL DISCECTOMY ANTERIOR WITH FUSION C5-6 with possible plating of C5-7 with neuromonitoring and 1 c-arm;  Surgeon: Karel Soliz MD;  Location:  [facility] OR;  Service: Neurosurgery;  Laterality: N/A;    APPENDECTOMY      BACK SURGERY      CARDIAC CATHETERIZATION Left 02/08/2021    Procedure: Left Heart Cath w poss intervention left anatomical snuff box acess;  Surgeon: Omkar Charles DO;  Location:  [facility] CATH INVASIVE LOCATION;  Service: Cardiology;  Laterality: Left;    CARDIAC SURGERY      CATARACT EXTRACTION      CERVICAL SPINE SURGERY      COLONOSCOPY N/A 01/31/2017    Normal exam repeat in 5 years    COLONOSCOPY N/A 02/11/2019    Mild acute inflammation    COLONOSCOPY W/ POLYPECTOMY  03/04/2014     Hyperplastic polyp    CORONARY ARTERY BYPASS GRAFT  10/2015    ENDOSCOPY  04/13/2011    Gastritis with hemorrhage    ENDOSCOPY N/A 05/05/2017    Normal exam    ENDOSCOPY N/A 02/11/2019    Gastritis    ENDOSCOPY N/A 09/01/2020    Non-erosive gastritis with hemorrhage    ENDOSCOPY N/A 02/10/2021    Esophagitis    FOOT SURGERY Left     INCISION AND DRAINAGE OF WOUND Left 09/2007    spider bite         OT ASSESSMENT FLOWSHEET (Last 12 Hours)       OT Evaluation and Treatment       Row Name 03/29/24 5531                   OT Time and Intention    Subjective Information complains of;dizziness;nausea/vomiting;pain;fatigue;numbness  tang hand numbness  -AC (r) HW (t) AC (c)        Document Type evaluation  -AC (r) HW (t) AC (c)        Mode of Treatment occupational therapy  -AC (r) HW (t) AC (c)        Patient Effort adequate  -AC (r) HW (t) AC (c)           General Information    Patient Profile Reviewed yes  -AC (r) HW (t) AC (c)        Prior Level of Function independent:;ADL's;cooking;cleaning;driving;work;shopping  -AC (r) HW (t) AC (c)        Equipment Currently Used at Home grab bar;commode  -AC (r) HW (t) AC (c)        Pertinent History of Current Functional Problem c/o altered mental status, worsening L foot wound; Dx: diabetic foot infection  -AC (r) HW (t) AC (c)        Existing Precautions/Restrictions fall  -AC (r) HW (t) AC (c)        Barriers to Rehab medically complex;previous functional deficit;cognitive status  -AC (r) HW (t) AC (c)           Living Environment    Current Living Arrangements home  step over tub  -AC (r) HW (t) AC (c)        Home Accessibility stairs to enter home  -AC (r) HW (t) AC (c)        People in Home spouse  -AC (r) HW (t) AC (c)        Name(s) of People in Home Joan  -AC (r) HW (t) AC (c)           Home Main Entrance    Number of Stairs, Main Entrance one  -AC (r) HW (t) AC (c)        Stair Railings, Main Entrance none  -AC (r) HW (t) AC (c)           Stairs Within Home, Primary     Number of Stairs, Within Home, Primary none  - (r) HW (t) AC (c)           Pain Assessment    Pretreatment Pain Rating 7/10  -AC (r) HW (t) AC (c)        Posttreatment Pain Rating 9/10  -AC (r) HW (t) AC (c)        Pain Location - abdomen  - (r) HW (t) AC (c)        Pain Intervention(s) Repositioned;Food;Rest  - (r) HW (t) AC (c)           Cognition    Orientation Status (Cognition) oriented to;person;place;time  - (r) HW (t) AC (c)        Personal Safety Interventions elopement precautions initiated;supervised activity;fall prevention program maintained  - (r) HW (t) AC (c)           Range of Motion Comprehensive    Comment, General Range of Motion BUE AROM WFL except tang shoulder flexion impaired approx. 50%  - (r) HW (t) AC (c)           Strength Comprehensive (MMT)    Comment, General Manual Muscle Testing (MMT) Assessment BUE strength grossly 4/5 except tang hand  strength 4-/5, R shoulder grossly 3+/5  - (r) HW (t) AC (c)           Sensory    Additional Documentation Sensory Assessment (Somatosensory) (Group)  - (r) HW (t) AC (c)           Sensory Assessment (Somatosensory)    Sensory Assessment (Somatosensory) right UE;sensation intact;left UE;other (see comments)  pt reports decreased sensation in posterior LUE from elbow to shoulder  - (r) HW (t) AC (c)        Left UE Sensory Assessment light touch awareness;intact  LUE sensation intact except L posterior from elbow to shoulder  - (r) HW (t) AC (c)        Right UE Sensory Assessment light touch awareness;intact  -AC (r) HW (t) AC (c)        Sensory Subjective Reports numbness  pt reports numbness in tang hands  - (r) HW (t) AC (c)           Activities of Daily Living    BADL Assessment/Intervention feeding;lower body dressing  - (r) HW (t) AC (c)           Lower Body Dressing Assessment/Training    [place] Level (Lower Body Dressing) doff;don;socks;maximum assist (25% patient effort)  - (r) HW (t) AC (c)        Position  (Lower Body Dressing) edge of bed sitting  -AC (r) HW (t) AC (c)        Comment, (Lower Body Dressing) simulated  -AC (r) HW (t) AC (c)           Self-Feeding Assessment/Training    [place] Level (Feeding) finger foods;scoop food and bring to mouth;supervision;prepare tray/open items;set up;liquids to mouth;minimum assist (75% patient effort)  -AC (r) HW (t) AC (c)        Position (Self-Feeding) edge of bed sitting  -AC (r) HW (t) AC (c)        Comment, (Feeding) pt demo's difficulty w/ FMC using silverware and required Min A to  cup  -AC (r) HW (t) AC (c)           BADL Safety/Performance    Impairments, BADL Safety/Performance endurance/activity tolerance;cognition;pain;range of motion;sensory awareness;strength  -AC (r) HW (t) AC (c)        Cognitive Impairments, BADL Safety/Performance safety precaution awareness;safety precaution follow-through  -AC (r) HW (t) AC (c)           Mobility    Extremity Weight-bearing Status left lower extremity  -AC (r) HW (t) AC (c)        Left Lower Extremity (Weight-bearing Status) non weight-bearing (NWB);other (see comments)  until otherwise ordered  -AC (r) HW (t) AC (c)           Bed Mobility    Bed Mobility supine-sit;sit-supine  -AC (r) HW (t) AC (c)        Rolling Left [place] (Bed Mobility) standby assist;verbal cues  -AC (r) HW (t) AC (c)        Rolling Right [place] (Bed Mobility) standby assist;verbal cues  -AC (r) HW (t) AC (c)        Scooting/Bridging [place] (Bed Mobility) maximum assist (25% patient effort);verbal cues  -AC (r) HW (t) AC (c)        Supine-Sit [place] (Bed Mobility) verbal cues;moderate assist (50% patient effort)  -AC (r) HW (t) AC (c)        Sit-Supine [place] (Bed Mobility) verbal cues;moderate assist (50% patient effort)  -AC (r) HW (t) AC (c)        Assistive Device (Bed Mobility) bed rails;draw sheet;head of bed elevated  -AC (r) HW (t) AC (c)           Functional Mobility    Patient was able to Ambulate no,  other medical factors prevent ambulation  -AC (r) HW (t) AC (c)        Reason Patient was unable to Ambulate Other (Comment)  awaiting further clarification on L foot WB status  -AC (r) HW (t) AC (c)           Transfer Assessment/Treatment    Comment, (Transfers) RN advised to hold out of bed activity this date; awaiting clarification on L foot  -AC (r) HW (t) AC (c)           Safety Issues, Functional Mobility    Safety Issues Affecting Function (Mobility) awareness of need for assistance;friction/shear risk;problem-solving;safety precaution awareness;safety precautions follow-through/compliance  -AC (r) HW (t) AC (c)        Impairments Affecting Function (Mobility) cognition;endurance/activity tolerance;grasp;pain;range of motion (ROM);sensation/sensory awareness;strength  -AC (r) HW (t) AC (c)        Cognitive Impairments, Mobility Safety/Performance safety precaution awareness;safety precaution follow-through;problem-solving/reasoning  -AC (r) HW (t) AC (c)           Balance    Balance Assessment sitting static balance;sitting dynamic balance  -AC (r) HW (t) AC (c)        Static Sitting Balance standby assist  -AC (r) HW (t) AC (c)        Dynamic Sitting Balance standby assist  -AC (r) HW (t) AC (c)        Position, Sitting Balance unsupported;sitting edge of bed  -AC (r) HW (t) AC (c)           Wound 06/15/23 0102 Left anterior plantar    Wound - Properties Group Placement Date: 06/15/23  -SM Placement Time: 0102  -SM Side: Left  -SM Orientation: anterior  -SM Location: plantar  -SM    Retired Wound - Properties Group Placement Date: 06/15/23  -SM Placement Time: 0102  -SM Side: Left  -SM Orientation: anterior  -SM Location: plantar  -SM    Retired Wound - Properties Group Date first assessed: 06/15/23  -SM Time first assessed: 0102  -SM Side: Left  -SM Location: plantar  -SM       Wound 08/22/23 0140 Left posterior plantar    Wound - Properties Group Placement Date: 08/22/23  -AM Placement Time: 0140  -AM  Present on Original Admission: Y  -AM Side: Left  -AM Orientation: posterior  -AM Location: plantar  -AM    Retired Wound - Properties Group Placement Date: 08/22/23  -AM Placement Time: 0140  -AM Present on Original Admission: Y  -AM Side: Left  -AM Orientation: posterior  -AM Location: plantar  -AM    Retired Wound - Properties Group Date first assessed: 08/22/23  -AM Time first assessed: 0140  -AM Present on Original Admission: Y  -AM Side: Left  -AM Location: plantar  -AM       Wound Left lateral foot Diabetic Ulcer    Wound - Properties Group Side: Left  -MH Orientation: lateral  -MH Location: foot  -JACIEL, 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;diabetic foot ulcer/gangrene  Primary Wound Type: Diabetic ulc  -JACIEL Wound Outcome: Amputation  -JACIEL Stage, Pressure Injury : other (see comments)  -JACIEL, full thickness starting 10/20/21     Retired Wound - Properties Group Side: Left  -MH Orientation: lateral  -MH Location: foot  -JACIEL, 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;diabetic foot ulcer/gangrene  Primary Wound Type: Diabetic ulc  -JACIEL Stage, Pressure Injury : other (see comments)  -JACIEL, full thickness starting 10/20/21  Wound Outcome: Amputation  -JACIEL    Retired Wound - Properties Group Side: Left  -MH Location: foot  -JACIEL, 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;diabetic foot ulcer/gangrene  Primary Wound Type: Diabetic ulc  -JACIEL Wound Outcome: Amputation  -JACIEL       Plan of Care Review    Plan of Care Reviewed With patient  -AC (r) HW (t) AC (c)        Progress improving  -AC (r) HW (t) AC (c)        Outcome Evaluation OT eval complete. Pt resting in supine upon OT arrival. Lethargic, but oriented to self, place, & time.  Mod A & vc for supine <> sit. Jcarlos shoulder flexion impaired approx. 50%. BUE strength grossly 4/5 except jcarlos hand  strength 4-/5 & R shoulder grossly 3+/5. Pt simulated doff/don socks sitting EOB, but required Max A. Pt completed lunch after tray set up w/ supervision to scoop/bring food to mouth & Min A to drink from a cup w/ straw. Pt demo'd  difficulty w/ FMC using silverware & to  cup. Pt required SBA & vc for rolling L/R in bed & Max A for scooting/bridging. Continued skilled therapy necessary to increase ADL independence. OT recommends SNF or sub-acute at d/c care pending medical progress.  -AC (r) HW (t) AC (c)           Positioning and Restraints    Pre-Treatment Position in bed  -AC (r) HW (t) AC (c)        Post Treatment Position bed  -AC (r) HW (t) AC (c)        In Bed fowlers;call light within reach;encouraged to call for assist;notified nsg;side rails up x3;exit alarm on  -AC (r) HW (t) AC (c)           Therapy Assessment/Plan (OT)    Date of Referral to OT 03/26/24  -AC (r) HW (t) AC (c)        OT Diagnosis decreased ADL independence  -AC (r) HW (t) AC (c)        Rehab Potential (OT) good, to achieve stated therapy goals  -AC (r) HW (t) AC (c)        Criteria for Skilled Therapeutic Interventions Met (OT) yes;meets criteria;skilled treatment is necessary  -AC (r) HW (t) AC (c)        Therapy Frequency (OT) 5 times/wk  -AC (r) HW (t) AC (c)        Predicted Duration of Therapy Intervention (OT) 10 days  -AC (r) HW (t) AC (c)        Problem List (OT) problems related to;cognition;mobility;range of motion (ROM);sensation;strength;pain;balance  -AC (r) HW (t) AC (c)        Planned Therapy Interventions (OT) activity tolerance training;adaptive equipment training;BADL retraining;cognitive/visual perception retraining;neuromuscular control/coordination retraining;occupation/activity based interventions;patient/caregiver education/training;ROM/therapeutic exercise;transfer/mobility retraining;strengthening exercise;functional balance retraining  -AC (r) HW (t) AC (c)           Therapy Plan Review/Discharge Plan (OT)    Anticipated Discharge Disposition (OT) sub acute care setting;skilled nursing facility  -AC (r) HW (t) AC (c)           OT Goals    Transfer Goal Selection (OT) transfer, OT goal 1  -AC (r) HW (t) AC (c)        Dressing Goal  Selection (OT) dressing, OT goal 1  -AC (r) HW (t) AC (c)        Grooming Goal Selection (OT) grooming, OT goal 1  -AC (r) HW (t) AC (c)           Transfer Goal 1 (OT)    Activity/Assistive Device (Transfer Goal 1, OT) bed-to-chair/chair-to-bed;toilet;tub;commode  -AC (r) HW (t) AC (c)        [place] Level/Cues Needed (Transfer Goal 1, OT) standby assist  -AC (r) HW (t) AC (c)        Time Frame (Transfer Goal 1, OT) 10 days  -AC (r) HW (t) AC (c)        Progress/Outcome (Transfer Goal 1, OT) new goal  -AC (r) HW (t) AC (c)           Dressing Goal 1 (OT)    Activity/Device (Dressing Goal 1, OT) lower body dressing  -AC (r) HW (t) AC (c)        [place]/Cues Needed (Dressing Goal 1, OT) standby assist  -AC (r) HW (t) AC (c)        Time Frame (Dressing Goal 1, OT) 10 days  -AC (r) HW (t) AC (c)        Progress/Outcome (Dressing Goal 1, OT) new goal  -AC (r) HW (t) AC (c)           Grooming Goal 1 (OT)    Activity/Device (Grooming Goal 1, OT) grooming skills, all  -AC (r) HW (t) AC (c)        [place] (Grooming Goal 1, OT) standby assist  -AC (r) HW (t) AC (c)        Time Frame (Grooming Goal 1, OT) 10 days  -AC (r) HW (t) AC (c)        Strategies/Barriers (Grooming Goal 1, OT) standing sink side  -AC (r) HW (t) AC (c)        Progress/Outcome (Grooming Goal 1, OT) new goal  -AC (r) HW (t) AC (c)                  User Key  (r) = Recorded By, (t) = Taken By, (c) = Cosigned By      Initials Name Effective Dates    AC Ezekiel Blake, OTR/L, CNT 02/03/23 -     Yuliana Lisa RN 06/16/21 - 02/13/22    MH Haase, Mallory L, RN 06/16/21 - 07/14/22    Faviola Kunz, RN 06/16/21 -     Michelle Diaz RN 09/22/22 -     Yulia Escobar, OT Student 01/09/24 -                      Occupational Therapy Education       Title: PT OT SLP Therapies (In Progress)       Topic: Occupational Therapy (In Progress)       Point: ADL training (Done)       Description:   Instruct learner(s) on proper safety  adaptation and remediation techniques during self care or transfers.   Instruct in proper use of assistive devices.                  Learning Progress Summary             Patient Acceptance, E, VU,NR by  at 3/29/2024 1424    Comment: Role of OT, OT POC, fall prevention, benefits of ambulation, d/c recommendations                         Point: Home exercise program (Not Started)       Description:   Instruct learner(s) on appropriate technique for monitoring, assisting and/or progressing therapeutic exercises/activities.                  Learner Progress:  Not documented in this visit.              Point: Precautions (Done)       Description:   Instruct learner(s) on prescribed precautions during self-care and functional transfers.                  Learning Progress Summary             Patient Acceptance, E, VU,NR by  at 3/29/2024 1424    Comment: Role of OT, OT POC, fall prevention, benefits of ambulation, d/c recommendations                         Point: Body mechanics (Done)       Description:   Instruct learner(s) on proper positioning and spine alignment during self-care, functional mobility activities and/or exercises.                  Learning Progress Summary             Patient Acceptance, E, VU,NR by  at 3/29/2024 1424    Comment: Role of OT, OT POC, fall prevention, benefits of ambulation, d/c recommendations                                         User Key       Initials Effective Dates Name Provider Type Discipline     01/09/24 -  Yulia Pritchett OT Student OT Student OT                      OT Recommendation and Plan  Planned Therapy Interventions (OT): activity tolerance training, adaptive equipment training, BADL retraining, cognitive/visual perception retraining, neuromuscular control/coordination retraining, occupation/activity based interventions, patient/caregiver education/training, ROM/therapeutic exercise, transfer/mobility retraining, strengthening exercise, functional balance  retraining  Therapy Frequency (OT): 5 times/wk  Plan of Care Review  Plan of Care Reviewed With: patient  Progress: improving  Outcome Evaluation: OT eval complete. Pt resting in supine upon OT arrival. Lethargic, but oriented to self, place, & time.  Mod A & vc for supine <> sit. Jcarlos shoulder flexion impaired approx. 50%. BUE strength grossly 4/5 except jcarlos hand  strength 4-/5 & R shoulder grossly 3+/5. Pt simulated doff/don socks sitting EOB, but required Max A. Pt completed lunch after tray set up w/ supervision to scoop/bring food to mouth & Min A to drink from a cup w/ straw. Pt demo'd difficulty w/ FMC using silverware & to  cup. Pt required SBA & vc for rolling L/R in bed & Max A for scooting/bridging. Continued skilled therapy necessary to increase ADL independence. OT recommends SNF or sub-acute at d/c care pending medical progress.  Plan of Care Reviewed With: patient  Outcome Evaluation: OT karenal complete. Pt resting in supine upon OT arrival. Lethargic, but oriented to self, place, & time.  Mod A & vc for supine <> sit. Jcarlos shoulder flexion impaired approx. 50%. BUE strength grossly 4/5 except jcarlos hand  strength 4-/5 & R shoulder grossly 3+/5. Pt simulated doff/don socks sitting EOB, but required Max A. Pt completed lunch after tray set up w/ supervision to scoop/bring food to mouth & Min A to drink from a cup w/ straw. Pt demo'd difficulty w/ FMC using silverware & to  cup. Pt required SBA & vc for rolling L/R in bed & Max A for scooting/bridging. Continued skilled therapy necessary to increase ADL independence. OT recommends SNF or sub-acute at d/c care pending medical progress.     Outcome Measures       Row Name 03/29/24 1400             How much help from another is currently needed...    Putting on and taking off regular lower body clothing? 1  -AC (r) HW (t) AC (c)      Bathing (including washing, rinsing, and drying) 1  -AC (r) HW (t) AC (c)      Toileting (which includes using  toilet bed pan or urinal) 2  -AC (r) HW (t) AC (c)      Putting on and taking off regular upper body clothing 3  -AC (r) HW (t) AC (c)      Taking care of personal grooming (such as brushing teeth) 2  -AC (r) HW (t) AC (c)      Eating meals 3  -AC (r) HW (t) AC (c)      AM-PAC 6 Clicks Score (OT) 12  -AC (r) HW (t)         Functional Assessment    Outcome Measure Options AM-PAC 6 Clicks Daily Activity (OT)  -AC (r) HW (t) AC (c)                User Key  (r) = Recorded By, (t) = Taken By, (c) = Cosigned By      Initials Name Provider Type    Ezekiel Ponce, OTR/L, CNT Occupational Therapist    Yulia Escobar, OT Student OT Student                    Time Calculation:    Time Calculation- OT       Row Name 03/29/24 1423             Time Calculation- OT    OT Start Time 1312  +15 mins chart review  -AC (r) HW (t) AC (c)      OT Stop Time 1350  -AC (r) HW (t) AC (c)      OT Time Calculation (min) 38 min  -AC (r) HW (t)      Total Timed Code Minutes- OT --  -AC (r) HW (t) AC (c)      OT Received On 03/29/24  -AC (r) HW (t) AC (c)      OT Goal Re-Cert Due Date 04/08/24  -AC (r) HW (t) AC (c)                User Key  (r) = Recorded By, (t) = Taken By, (c) = Cosigned By      Initials Name Provider Type    Ezekiel Ponce, OTR/L, CNT Occupational Therapist    Yulia Escobar, OT Student OT Student                           Yulia Pritchett, OT Student  3/29/2024

## 2024-03-29 NOTE — PROGRESS NOTES
[facility] Medicine Services  INPATIENT PROGRESS NOTE    Patient Name: Erick Luong  Date of Admission: 3/26/2024  Today's Date: 03/29/24  Length of Stay: 3  Primary Care Physician: Del Shetty MD    Subjective   Chief Complaint: Altered mental status    HPI   Patient is alert and oriented x 3.  He appears to be back to baseline mental status.  Still awaiting MRI of the left foot which has not been done.    Review of Systems   All pertinent negatives and positives are as above. All other systems have been reviewed and are negative unless otherwise stated.     Objective    Temp:  [98.3 °F (36.8 °C)-99.5 °F (37.5 °C)] 98.7 °F (37.1 °C)  Heart Rate:  [] 77  Resp:  [16] 16  BP: ()/(45-70) 94/45  Physical Exam  Constitutional:       General: He is not in acute distress.     Appearance: He is well-developed. He is not diaphoretic.   HENT:      Head: Normocephalic.   Eyes:      General: No scleral icterus.     Conjunctiva/sclera: Conjunctivae normal.      Pupils: Pupils are equal, round, and reactive to light.   Neck:      Thyroid: No thyromegaly.      Vascular: No JVD.      Trachea: No tracheal deviation.   Cardiovascular:      Rate and Rhythm: Normal rate and regular rhythm.      Heart sounds: Normal heart sounds. No murmur heard.     No friction rub. No gallop.   Pulmonary:      Effort: Pulmonary effort is normal. No respiratory distress.      Breath sounds: Normal breath sounds. No stridor. No wheezing or rales.   Chest:      Chest wall: No tenderness.   Abdominal:      General: Bowel sounds are normal. There is no distension.      Palpations: Abdomen is soft. There is no mass.      Tenderness: There is no abdominal tenderness. There is no guarding or rebound.      Hernia: No hernia is present.   Musculoskeletal:         General: Swelling and signs of injury present. No tenderness or deformity. Normal range of motion.      Cervical back: Normal range of motion and  neck supple.      Right lower leg: No edema.      Comments: Left foot with ulcers noted on dorsal and plantar surface/heel   Lymphadenopathy:      Cervical: No cervical adenopathy.   Skin:     General: Skin is warm and dry.      Coloration: Skin is not pale.      Findings: No erythema or rash.   Neurological:      General: No focal deficit present.      Mental Status: He is alert and oriented to person, place, and time.      Cranial Nerves: No cranial nerve deficit.      Sensory: No sensory deficit.      Motor: No abnormal muscle tone.      Coordination: Coordination normal.   Psychiatric:         Mood and Affect: Mood normal.         Behavior: Behavior normal.            File Link    Scan on 3/26/2024 1423 by Ro Vinson RN: Wound 06/15/23 0102 Left anterior plantar        Key Information    Document ID File Type Document Type Description   [id number] Image WOUND IMAGE Wound 06/15/23 0102 Left anterior plantar     Import Information    Attached At Date Time User Dept   Encounter Level 3/26/2024  2:23 PM Ro Vinson RN [facility] Emergency Dept     Encounter    Hospital Encounter on 3/26/24         Results Review:  I have reviewed the labs, radiology results, and diagnostic studies.    Laboratory Data:   Results from last 7 days   Lab Units 03/29/24  0551 03/28/24  1004 03/27/24  0449   WBC 10*3/mm3 6.08 9.45 12.75*   HEMOGLOBIN g/dL 11.1* 13.7 11.8*   HEMATOCRIT % 33.6* 41.1 35.9*   PLATELETS 10*3/mm3 156 154 163        Results from last 7 days   Lab Units 03/29/24  0551 03/28/24  1004 03/27/24  0449 03/26/24  1326   SODIUM mmol/L 138 140 142 140   POTASSIUM mmol/L 3.3* 3.6 3.5 3.4*   CHLORIDE mmol/L 103 102 103 103   CO2 mmol/L 23.0 22.0 25.0 23.0   BUN mg/dL 40* 36* 29* 29*   CREATININE mg/dL 2.07* 2.03* 2.07* 1.93*   CALCIUM mg/dL 8.4* 9.2 8.9 9.5   BILIRUBIN mg/dL 0.7  --   --  0.7   ALK PHOS U/L 81  --   --  91   ALT (SGPT) U/L 17  --   --  8   AST (SGOT) U/L 32  --   --  13   GLUCOSE mg/dL 120* 79  169* 438*       Culture Data:   Blood Culture   Date Value Ref Range Status   03/26/2024 Abnormal Stain (C)  Preliminary   03/26/2024 Abnormal Stain (C)  Preliminary       Radiology Data:   Imaging Results (Last 24 Hours)       Procedure Component Value Units Date/Time    MRI Foot Left With & Without Contrast [[id number]] Resulted: 03/29/24 1731     Updated: 03/29/24 1814            I have reviewed the patient's current medications.     Assessment/Plan   Assessment  Active Hospital Problems    Diagnosis     **Sepsis     Diabetic foot infection     Chronic kidney disease (CKD), stage IV (severe)     Chronic diastolic heart failure     BPH without obstruction/lower urinary tract symptoms     Diabetic polyneuropathy associated with type 2 diabetes mellitus     Anemia due to chronic kidney disease     Essential hypertension     Type 2 diabetes mellitus with hyperglycemia, with long-term current use of insulin    MRSA bacteremia  Metabolic encephalopathy secondary to sepsis    Treatment Plan  Continue IV antibiotics with Zyvox.  Discontinue meropenem.  Infectious disease input appreciated.    Podiatry consultation input appreciated.  Will follow recommendations.  Please do MR left foot today to rule out osteomyelitis.  Follow-up repeat blood cultures.    Nephrology and urology input appreciated.  Will monitor renal function and watch for signs of urinary retention    Pain control with opiate pain medications.    Insulin sliding scale and Levemir for type 2 diabetes mellitus    IV Haldol as needed for agitation.    DVT prophylaxis with Eliquis    PT/OT/wound care consultations    DVT prophylaxis with Eliquis    CODE STATUS is full code    Medical Decision Making  Number and Complexity of problems: 4 acute, severe, high complexity medical problems.  Multiple chronic medical problems.  Differential Diagnosis: None    Conditions and Status        Condition is worsening.     MDM Data  External documents reviewed: None  Cardiac  tracing (EKG, telemetry) interpretation: None  Radiology interpretation: None  Labs reviewed: CBC, BMP, blood cultures reviewed by me  Any tests that were considered but not ordered: None     Decision rules/scores evaluated (example [id number], Wells, etc): N/A     Discussed with: Patient     Care Planning  Shared decision making: Patient and his wife  Code status and discussions: CODE STATUS is full code    Disposition  Social Determinants of Health that impact treatment or disposition: None  I expect the patient to be discharged to skilled nursing facility in 5 to 7 days.     Electronically signed by Rene Maloney MD, 03/29/24, 18:17 CDT.    Daily Assessment

## 2024-03-29 NOTE — PLAN OF CARE
Goal Outcome Evaluation:  Plan of Care Reviewed With: patient        Progress: improving  Outcome Evaluation: OT eval complete. Pt resting in supine upon OT arrival. Lethargic, but oriented to self, place, & time.  Mod A & vc for supine <> sit. Jcarlos shoulder flexion impaired approx. 50%. BUE strength grossly 4/5 except jcarlos hand  strength 4-/5 & R shoulder grossly 3+/5. Pt simulated doff/don socks sitting EOB, but required Max A. Pt completed lunch after tray set up w/ supervision to scoop/bring food to mouth & Min A to drink from a cup w/ straw. Pt demo'd difficulty w/ FMC using silverware & to  cup. Pt required SBA & vc for rolling L/R in bed & Max A for scooting/bridging. Continued skilled therapy necessary to increase ADL independence. OT recommends SNF or sub-acute at d/c care pending medical progress.      Anticipated Discharge Disposition (OT): sub acute care setting, skilled nursing facility

## 2024-03-29 NOTE — PROGRESS NOTES
"INFECTIOUS DISEASES PROGRESS NOTE    Patient:  Erick Luong  YOB: 1955  MRN: [id number]   Admit date: 3/26/2024   Admitting Physician: Rene Maloney MD  Primary Care Physician: Del Shetty MD    Chief Complaint: \"Doing okay\"        Interval History: Patient indicated me that he is \"doing okay\".  No further emesis.  Still has not had an MRI.  Per ELVIS Cabrera plans are to reattempt that today.    Allergies:   Allergies   Allergen Reactions    Cefepime Hives and Anaphylaxis    Bactrim [Sulfamethoxazole-Trimethoprim] Other (See Comments)     \"RENAL FAILURE\"    Vancomycin Itching    Zolpidem Unknown - High Severity     \"makes him crazy\"    Zolpidem Tartrate Unknown - Low Severity and Provider Review Needed    Metronidazole Rash       Current Scheduled Medications:   apixaban, 5 mg, Oral, Q12H  ascorbic acid, 1,000 mg, Oral, Daily  aspirin, 81 mg, Oral, Daily  bumetanide, 1 mg, Oral, Daily  calcitriol, 0.5 mcg, Oral, Daily  carvedilol, 3.125 mg, Oral, BID With Meals  donepezil, 10 mg, Oral, Daily  famotidine, 20 mg, Oral, BID AC  insulin detemir, 30 Units, Subcutaneous, Nightly  insulin lispro, 4-24 Units, Subcutaneous, 4x Daily AC & at Bedtime  insulin regular, 10 Units, Subcutaneous, TID AC  isosorbide mononitrate, 30 mg, Oral, Q24H  Linezolid, 600 mg, Intravenous, Q12H  melatonin, 6 mg, Oral, Nightly  midodrine, 2.5 mg, Oral, TID AC  mupirocin, 1 Application, Each Nare, BID  pregabalin, 100 mg, Oral, Nightly  pregabalin, 50 mg, Oral, Daily  rosuvastatin, 10 mg, Oral, Nightly  senna-docusate sodium, 1 tablet, Oral, BID  sodium chloride, 10 mL, Intravenous, Q12H  sucralfate, 1 g, Oral, TID AC  tamsulosin, 0.4 mg, Oral, Daily  timolol, 1 drop, Both Eyes, Q12H  vitamin D3, 5,000 Units, Oral, Daily  zinc sulfate, 220 mg, Oral, Daily      Current PRN Medications:    acetaminophen **OR** acetaminophen **OR** acetaminophen    senna-docusate sodium **AND** polyethylene glycol **AND** bisacodyl **AND** " "bisacodyl    dextrose    dextrose    Diclofenac Sodium    glucagon (human recombinant)    haloperidol lactate    LORazepam    magnesium hydroxide    nitroglycerin    ondansetron ODT **OR** ondansetron    oxyCODONE    sodium chloride    sodium chloride    sodium chloride            Objective     Vital Signs:  Temp (24hrs), Av.8 °F (37.1 °C), Min:98.2 °F (36.8 °C), Max:99.5 °F (37.5 °C)      /55 (BP Location: Left arm, Patient Position: Lying)   Pulse 78   Temp 99.3 °F (37.4 °C) (Oral)   Resp 16   Ht 182.9 cm (72\")   Wt 132 kg (290 lb 2 oz)   SpO2 100%   BMI 39.35 kg/m²         Physical Exam:  General: The patient is nontoxic-appearing lying in bed in no acute distress  Respiratory: Effort even and unlabored  Abdomen: Soft, nontender  Left lower extremity dressing clean dry and intact.  See photos  Neuro: Hard of hearing, alert, conversant                        Results Review:    I reviewed the patient's new clinical results.    Lab Results:    CBC:   Lab Results   Lab 24  1326 24  0449 24  1004 24  0551   WBC 14.69* 12.75* 9.45 6.08   HEMOGLOBIN 11.3* 11.8* 13.7 11.1*   HEMATOCRIT 33.8* 35.9* 41.1 33.6*   PLATELETS 171 163 154 156        AutoDiff:   Lab Results   Lab 24  0449 24  1004 24  0551   NEUTROPHIL % 86.5* 77.3* 70.8   LYMPHOCYTE % 5.2* 13.4* 15.5*   MONOCYTES % 7.5 7.9 11.0   EOSINOPHIL % 0.0* 0.7 1.5   BASOPHIL % 0.2 0.3 0.5   NEUTROS ABS 11.03* 7.29* 4.31   LYMPHS ABS 0.66* 1.27 0.94   MONOS ABS 0.95* 0.75 0.67   EOS ABS 0.00 0.07 0.09   BASOS ABS 0.03 0.03 0.03        Manual Diff:    Lab Results   Lab 24  0449 24  1004 24  0551   NEUTROS ABS 11.03* 7.29* 4.31           CMP:   Lab Results   Lab 24  1326 24  0449 24  1004 24  0551   SODIUM 140 142 140 138   POTASSIUM 3.4* 3.5 3.6 3.3*   CHLORIDE 103 103 102 103   CO2 23.0 25.0 22.0 23.0   BUN 29* 29* 36* 40*   CREATININE 1.93* 2.07* 2.03* 2.07*   CALCIUM " 9.5 8.9 9.2 8.4*   BILIRUBIN 0.7  --   --  0.7   ALK PHOS 91  --   --  81   ALT (SGPT) 8  --   --  17   AST (SGOT) 13  --   --  32   GLUCOSE 438* 169* 79 120*       Estimated Creatinine Clearance: 48.7 mL/min (A) (by C-G formula based on SCr of 2.07 mg/dL (H)).    Culture Results:    Microbiology Results (last 10 days)       Procedure Component Value - Date/Time    MRSA Screen, PCR (Inpatient) - Swab, Nares [[id number]]  (Abnormal) Collected: 03/28/24 2128    Lab Status: Final result Specimen: Swab from Nares Updated: 03/28/24 2239     MRSA PCR MRSA Detected    Narrative:      The negative predictive value of this diagnostic test is high and should only be used to consider de-escalating anti-MRSA therapy. A positive result may indicate colonization with MRSA and must be correlated clinically.    AMAN AURIS SCREEN - Swab, Axilla Right, Axilla Left and Groin [[id number]]  (Normal) Collected: 03/27/24 0351    Lab Status: Preliminary result Specimen: Swab from Axilla Right, Axilla Left and Groin Updated: 03/29/24 0416     Candida Auris Screen Culture No Candida auris isolated at 2 days    Respiratory Panel PCR w/COVID-19(SARS-CoV-2) MICHAEL/ANKUSH/SEAN/PAD/[facility] In-House, NP Swab in UTM/VTM, 2 HR TAT - Swab, Nasopharynx [[id number]]  (Normal) Collected: 03/26/24 1732    Lab Status: Final result Specimen: Swab from Nasopharynx Updated: 03/26/24 1905     ADENOVIRUS, PCR Not Detected     Coronavirus 229E Not Detected     Coronavirus HKU1 Not Detected     Coronavirus NL63 Not Detected     Coronavirus OC43 Not Detected     COVID19 Not Detected     Human Metapneumovirus Not Detected     Human Rhinovirus/Enterovirus Not Detected     Influenza A PCR Not Detected     Influenza B PCR Not Detected     Parainfluenza Virus 1 Not Detected     Parainfluenza Virus 2 Not Detected     Parainfluenza Virus 3 Not Detected     Parainfluenza Virus 4 Not Detected     RSV, PCR Not Detected     Bordetella pertussis pcr Not Detected     Bordetella  parapertussis PCR Not Detected     Chlamydophila pneumoniae PCR Not Detected     Mycoplasma pneumo by PCR Not Detected    Narrative:      In the setting of a positive respiratory panel with a viral infection PLUS a negative procalcitonin without other underlying concern for bacterial infection, consider observing off antibiotics or discontinuation of antibiotics and continue supportive care. If the respiratory panel is positive for atypical bacterial infection (Bordetella pertussis, Chlamydophila pneumoniae, or Mycoplasma pneumoniae), consider antibiotic de-escalation to target atypical bacterial infection.    Blood Culture - Blood, Arm, Right [[id number]]  (Abnormal) Collected: 03/26/24 1346    Lab Status: Final result Specimen: Blood from Arm, Right Updated: 03/29/24 0513     Blood Culture Staphylococcus aureus, MRSA     Comment:   Infectious disease consultation is highly recommended to rule out distant foci of infection.  Methicillin resistant Staphylococcus aureus, Patient may be an isolation risk.        Isolated from Aerobic and Anaerobic Bottles     Gram Stain Aerobic Bottle Gram positive cocci      Anaerobic Bottle Gram positive cocci    Blood Culture - Blood, Arm, Left [[id number]]  (Abnormal)  (Susceptibility) Collected: 03/26/24 1346    Lab Status: Final result Specimen: Blood from Arm, Left Updated: 03/29/24 0513     Blood Culture Staphylococcus aureus, MRSA     Comment:   Infectious disease consultation is highly recommended to rule out distant foci of infection.  Methicillin resistant Staphylococcus aureus, Patient may be an isolation risk.        Isolated from Aerobic and Anaerobic Bottles     Gram Stain Aerobic Bottle Gram positive cocci      Anaerobic Bottle Gram positive cocci    Susceptibility        Staphylococcus aureus, MRSA      MATT      Gentamicin Susceptible      Oxacillin Resistant      Rifampin Susceptible      Vancomycin Susceptible                       Susceptibility Comments        Staphylococcus aureus, MRSA    This isolate is presumed to be clindamycin resistant based on detection of inducible clindamycin resistance.  Clindamycin may still be effective in some patients.               Blood Culture ID, PCR - Blood, Arm, Left [[id number]]  (Abnormal) Collected: 03/26/24 1346    Lab Status: Final result Specimen: Blood from Arm, Left Updated: 03/27/24 0426     BCID, PCR Staph aureus. mecA/C and MREJ (methicillin resistance gene) detected. Identification by BCID2 PCR.     BOTTLE TYPE Aerobic Bottle    Narrative:      Infectious disease consultation is highly recommended to rule out distant foci of infection.                 Radiology:   Imaging Results (Last 72 Hours)       Procedure Component Value Units Date/Time    XR Foot 3+ View Left [[id number]] Collected: 03/26/24 1723     Updated: 03/26/24 1728    Narrative:      EXAMINATION: XR FOOT 3+ VW LEFT-     3/26/2024 4:19 PM     HISTORY: foot infection     3 view LEFT foot exam.     Previous amputation of the mid/distal fifth metatarsal.     Interval resection or resorption of the fourth metatarsal head.     No bone destruction or soft tissue air is seen.     High-grade degenerative disease at the first metatarsal phalangeal  joint.     Unchanged appearance of prominent dorsal midfoot spurring and prominent  inferior calcaneal spurring.     Mild soft tissue edema over the dorsum of the foot is less prominent as  compared with the previous exam.       Impression:      1. No bone destruction or soft tissue air is seen.  2. Prominent degenerative spurring.           This report was signed and finalized on 3/26/2024 5:25 PM by Dr. Gomez Mcgill MD.       XR Chest 1 View [[id number]] Collected: 03/26/24 1433     Updated: 03/26/24 1437    Narrative:      EXAM: XR CHEST 1 VW-      DATE: 3/26/2024 1:15 PM     HISTORY: ams       COMPARISON: 8/28/2023.     TECHNIQUE:  Frontal view(s) of the chest submitted.     FINDINGS:    Patient is status post median  sternotomy and CABG. There is enlargement  of the cardiac silhouette. There are low lung volumes with vascular  crowding versus mild volume overload. No effusion or pneumothorax is  seen.          Impression:         1. Postoperative chest and low lung volumes with vascular crowding  versus mild volume overload.     This report was signed and finalized on 3/26/2024 2:34 PM by Eran Christina.       CT Head Without Contrast [[id number]] Collected: 03/26/24 1408     Updated: 03/26/24 1413    Narrative:      EXAM: CT HEAD WO CONTRAST-      DATE: 3/26/2024 1:03 PM     HISTORY: ams       COMPARISON: 10/10/2023.     DOSE LENGTH PRODUCT: 876.8 mGy.cm  Automated exposure control was also  utilized to decrease patient radiation dose.     TECHNIQUE: Unenhanced CT images obtained from vertex to skull base with  multiplanar reformats.     FINDINGS:  There is no acute intracranial hemorrhage, midline shift, mass effect,  or hydrocephalus. There is no CT evidence for acute infarct.     There are chronic changes with volume loss and chronic small vessel  ischemic change of the periventricular white matter.      SOFT TISSUES: The scalp soft tissues are unremarkable.        SINUS: There is partially imaged mucosal thickening at the right  maxillary sinus.     ORBITS: The visualized orbits and globes are unremarkable. There is  bilateral lens extraction.          Impression:      1. Chronic changes and no acute intracranial findings.      This report was signed and finalized on 3/26/2024 2:10 PM by Eran Christina.                   Active Hospital Problems    Diagnosis     **Sepsis     Diabetic foot infection     Chronic kidney disease (CKD), stage IV (severe)     Chronic diastolic heart failure     BPH without obstruction/lower urinary tract symptoms     Diabetic polyneuropathy associated with type 2 diabetes mellitus     Anemia due to chronic kidney disease     Essential hypertension     Type 2 diabetes mellitus with  hyperglycemia, with long-term current use of insulin        IMPRESSION:  MRSA bacteremia-patient with history of MRSA infection right foot in past and any left foot wounds this admission and associated cellulitis.  Encephalopathy on admission-appears resolved  Chronic kidney disease stage IIIb  Type 2 diabetes mellitus-poorly controlled with hemoglobin A1c of 10.7 this admission.  MRSA nasal screen positive      RECOMMENDATION:   Continue linezolid  Wound care per Dr. Cerrato  Await MRI findings  Continue surveillance blood cultures-ordered 1 today and will order one for her in the morning  If has persistent bacteremia will need additional workup.  Glucose management per attending  Start intranasal Bactroban for MRSA nasal carriage      Julia Adrian MD  03/29/24  11:20 CDT

## 2024-03-29 NOTE — PLAN OF CARE
Goal Outcome Evaluation:           Progress: improving  Outcome Evaluation: Patient rested well. Complains of pain x1 thus far. PRN ativan given x1 for anxiety. Eason in place. IV abx infusing per order. Wound care completed per order this AM. VSS. Safety maintained.

## 2024-03-29 NOTE — PLAN OF CARE
Goal Outcome Evaluation:  Plan of Care Reviewed With: patient        Progress: improving  Outcome Evaluation: Pt was very sleepy when this RD visited and kept his eyes closed most of the time. He is receiving a healthy heart, C.CHO diet. He does not report nausea today and it appears his appetite is slowly improving. He has consumed 23% of the last 4 meals. He is receiving Boost gluc control with meals, unsure if he is drinking these supplements. He did not respond when this RD questioned him. Will cont to follow and encourage intake.

## 2024-03-29 NOTE — PLAN OF CARE
Goal Outcome Evaluation:  Plan of Care Reviewed With: patient        Progress: no change  Outcome Evaluation: Pt has been sleeping off and on most of the shift; c/o pain x1, medicated with PRN meds; F/C d/c'd, DTV; eating better, but c/o nausea x1, medicated with PRN zofran; small amt of stool this shift; safety maintained.

## 2024-03-29 NOTE — PAYOR COMM NOTE
"Erick Luong (68 y.o. Male)    Erick Luong (68 y.o. Male)      [id number]   additional  clinical         [facility] phone    fax                Date of Birth   1955    Social Security Number       Address   [address] [place] [address]    Home Phone   [phone]    MRN   [id number]       Denominational   Mormonism    Marital Status                               Admission Date   3/26/24    Admission Type   Emergency    Admitting Provider   Rene Maloney MD    Attending Provider   Rene Maloney MD    Department, Room/Bed   [facility] 3C, 372/1       Discharge Date       Discharge Disposition       Discharge Destination                                 Attending Provider: Rene Maloney MD    Allergies: Cefepime, Bactrim [Sulfamethoxazole-trimethoprim], Vancomycin, Zolpidem, Zolpidem Tartrate, Metronidazole    Isolation: Contact   Infection: MRSA (05/19/19), COVID (History) (08/08/22), Indira Auris (rule out) (03/27/24)   Code Status: CPR    Ht: 182.9 cm (72\")   Wt: 132 kg (290 lb 2 oz)    Admission Cmt: None   Principal Problem: Sepsis [A41.9]                   Active Insurance as of 3/26/2024       Primary Coverage       Payor Plan Insurance Group Employer/Plan Group    [employer]UE [facility] EMPLOYEE [id number]       Payor Plan Address Payor Plan Phone Number Payor Plan Fax Number Effective Dates    [address] [phone]  1/1/2022 - None Entered    [facility] [id number]         Subscriber Name Subscriber Birth Date Member ID       ZAINAB LUONG 11/27/1969 [id number]               Secondary Coverage       Payor Plan Insurance Group Employer/Plan Group    MEDICARE MEDICARE A & B        Payor Plan Address Payor Plan Phone Number Payor Plan Fax Number Effective Dates    [address] [phone]  7/1/2013 - None Entered    [facility] [id number]         Subscriber Name Subscriber Birth Date Member ID    "    ERICK LUONG 1955 [id number]                     Emergency Contacts        (Rel.) Home Phone Work Phone Mobile Phone    Joan Luong (Spouse) [phone]                 Physician Progress Notes (last 24 hours)        Rene Maloney MD at 03/28/24 1814              [facility]  INPATIENT PROGRESS NOTE    Patient Name: Erick Luong  Date of Admission: 3/26/2024  Today's Date: 03/28/24  Length of Stay: 2  Primary Care Physician: Del Shetty MD    Subjective   Chief Complaint: Altered mental status    HPI   Patient is alert and cooperative today but remains confused. He was evaluated with his wife at bedside.    Review of Systems   All pertinent negatives and positives are as above. All other systems have been reviewed and are negative unless otherwise stated.     Objective    Temp:  [97.4 °F (36.3 °C)-102.2 °F (39 °C)] 98.2 °F (36.8 °C)  Heart Rate:  [] 95  Resp:  [16-18] 18  BP: (119-148)/(50-81) 119/54  Physical Exam  Constitutional:       General: He is not in acute distress.     Appearance: He is well-developed. He is not diaphoretic.   HENT:      Head: Normocephalic.   Eyes:      Conjunctiva/sclera: Conjunctivae normal.      Pupils: Pupils are equal, round, and reactive to light.   Neck:      Thyroid: No thyromegaly.      Vascular: No JVD.      Trachea: No tracheal deviation.   Cardiovascular:      Rate and Rhythm: Normal rate and regular rhythm.      Heart sounds: Normal heart sounds. No murmur heard.     No friction rub. No gallop.   Pulmonary:      Effort: Pulmonary effort is normal. No respiratory distress.      Breath sounds: Normal breath sounds. No stridor. No wheezing or rales.   Chest:      Chest wall: No tenderness.   Abdominal:      General: Bowel sounds are normal. There is no distension.      Palpations: Abdomen is soft. There is no mass.      Tenderness: There is no abdominal  tenderness. There is no guarding or rebound.      Hernia: No hernia is present.   Musculoskeletal:         General: Swelling and signs of injury present. No tenderness or deformity. Normal range of motion.      Cervical back: Normal range of motion and neck supple.      Right lower leg: No edema.      Comments: Left foot with ulcers noted on dorsal and plantar surface/heel   Lymphadenopathy:      Cervical: No cervical adenopathy.   Skin:     General: Skin is warm and dry.      Coloration: Skin is not pale.      Findings: No erythema or rash.   Neurological:      General: No focal deficit present.      Mental Status: He is alert.      Cranial Nerves: No cranial nerve deficit.      Sensory: No sensory deficit.      Motor: No abnormal muscle tone.      Coordination: Coordination normal.      Comments: Oriented to self only.   Psychiatric:         Mood and Affect: Mood normal.         Behavior: Behavior normal.            File Link    Scan on 3/26/2024 1423 by Ro Vinson RN: Wound 06/15/23 0102 Left anterior plantar        Key Information    Document ID File Type Document Type Description   [id number] Image WOUND IMAGE Wound 06/15/23 0102 Left anterior plantar     Import Information    Attached At Date Time User Dept   Encounter Level 3/26/2024  2:23 PM Ro Vinson RN [facility] Emergency Dept     Encounter    Hospital Encounter on 3/26/24         Results Review:  I have reviewed the labs, radiology results, and diagnostic studies.    Laboratory Data:   Results from last 7 days   Lab Units 03/28/24  1004 03/27/24  0449 03/26/24  1326   WBC 10*3/mm3 9.45 12.75* 14.69*   HEMOGLOBIN g/dL 13.7 11.8* 11.3*   HEMATOCRIT % 41.1 35.9* 33.8*   PLATELETS 10*3/mm3 154 163 171        Results from last 7 days   Lab Units 03/28/24  1004 03/27/24  0449 03/26/24  1326   SODIUM mmol/L 140 142 140   POTASSIUM mmol/L 3.6 3.5 3.4*   CHLORIDE mmol/L 102 103 103   CO2 mmol/L 22.0 25.0 23.0   BUN mg/dL 36* 29* 29*   CREATININE  mg/dL 2.03* 2.07* 1.93*   CALCIUM mg/dL 9.2 8.9 9.5   BILIRUBIN mg/dL  --   --  0.7   ALK PHOS U/L  --   --  91   ALT (SGPT) U/L  --   --  8   AST (SGOT) U/L  --   --  13   GLUCOSE mg/dL 79 169* 438*       Culture Data:   Blood Culture   Date Value Ref Range Status   03/26/2024 Abnormal Stain (C)  Preliminary   03/26/2024 Abnormal Stain (C)  Preliminary       Radiology Data:   Imaging Results (Last 24 Hours)       ** No results found for the last 24 hours. **            I have reviewed the patient's current medications.     Assessment/Plan   Assessment  Active Hospital Problems    Diagnosis     **Sepsis     Diabetic foot infection     Chronic kidney disease (CKD), stage IV (severe)     Chronic diastolic heart failure     BPH without obstruction/lower urinary tract symptoms     Diabetic polyneuropathy associated with type 2 diabetes mellitus     Anemia due to chronic kidney disease     Essential hypertension     Type 2 diabetes mellitus with hyperglycemia, with long-term current use of insulin    MRSA bacteremia  Metabolic encephalopathy secondary to sepsis    Treatment Plan  Continue IV antibiotics with Zyvox.  Discontinue meropenem.  Infectious disease input appreciated.    Podiatry consultation input appreciated.  Will follow recommendations.  Please do MR left foot today to rule out osteomyelitis.  Repeat blood cultures in the next 24 to 48 hours to assess for clearing of MRSA bacteremia.    Nephrology and urology input appreciated.  Will monitor renal function and watch for signs of urinary retention    Pain control with opiate pain medications.    Insulin sliding scale and Levemir for type 2 diabetes mellitus    IV Haldol as needed for agitation.  Will stay inpatient for safety    DVT prophylaxis with Eliquis    PT/OT/wound care consultations    DVT prophylaxis with Eliquis    CODE STATUS is full code    Medical Decision Making  Number and Complexity of problems: 4 acute, severe, high complexity medical  problems.  Multiple chronic medical problems.  Differential Diagnosis: None    Conditions and Status        Condition is worsening.     [facility] Data  External documents reviewed: None  Cardiac tracing (EKG, telemetry) interpretation: None  Radiology interpretation: None  Labs reviewed: CBC, BMP, blood cultures reviewed by me  Any tests that were considered but not ordered: None     Decision rules/scores evaluated (example [id number], Wells, etc): N/A     Discussed with: Patient     Care Planning  Shared decision making: Patient and his wife  Code status and discussions: CODE STATUS is full code    Disposition  Social Determinants of Health that impact treatment or disposition: None  I expect the patient to be discharged to skilled nursing facility in 5 to 7 days.     Electronically signed by Rene Maloney MD, 03/28/24, 18:17 CDT.     Electronically signed by Rene Maloney MD at 03/28/24 1820       Adelaida Vines APRN at 03/28/24 1709              [facility] - PODIATRY    Today's Date: 03/28/24     Patient Name: Erick Luong  MRN: [id number]  CSN: [id number]  PCP: Del Shetty MD  Referring Provider: No ref. provider found  Attending Provider: Rene Maloney MD  Length of Stay: 2    SUBJECTIVE   Chief Complaint: Left foot wounds    HPI: Erick Luong, a 68 y.o.male, who was admitted on 3/6/2024 and podiatry consult was made for left foot wounds.  Patient more alert today and denies pain in left foot.  Patient reports he has not had an MRI of his left foot performed yet.     Past Medical History:   Diagnosis Date    Arthritis     Autonomic disease     CAD (coronary artery disease) 02/06/2017    Cervical radiculopathy 09/16/2021    Chronic constipation with acute exaccerbation 05/10/2021    Coronary artery disease     Degeneration of cervical intervertebral disc 08/11/2021    Diabetes mellitus     Diabetic foot ulcer 08/31/2020    Diabetic polyneuropathy associated with type 2 diabetes  mellitus 01/18/2021    Elevated cholesterol     Gastroesophageal reflux disease 05/13/2019    Headache     HTN (hypertension), benign 05/03/2017    Hyperlipidemia     Hypertension     Mixed hyperlipidemia 02/07/2017    Multiple lung nodules 01/26/2020    5mm, 9 mm RLL identified 1/2020, not present 10/2019.    Myocardial infarction     Osteomyelitis 01/22/2020    Osteomyelitis of fifth toe of right foot 10/07/2019    Pancreatitis     Persistent insomnia 01/20/2020    Renal disorder     Sleep apnea 02/06/2017    Sleep apnea with use of continuous positive airway pressure (CPAP)     NON-COMPLIANT    Slow transit constipation 01/16/2019    Spinal stenosis in cervical region 09/16/2021    Vitamin D deficiency 03/02/2021     Past Surgical History:   Procedure Laterality Date    ABDOMINAL SURGERY      AMPUTATION FOOT / TOE Left 10/2021    5th digit     ANTERIOR CERVICAL DISCECTOMY W/ FUSION N/A 8/5/2022    Procedure: CERVICAL DISCECTOMY ANTERIOR WITH FUSION C5-6 with possible plating of C5-7 with neuromonitoring and 1 c-arm;  Surgeon: Karel Soliz MD;  Location:  [facility] OR;  Service: Neurosurgery;  Laterality: N/A;    APPENDECTOMY      BACK SURGERY      CARDIAC CATHETERIZATION Left 02/08/2021    Procedure: Left Heart Cath w poss intervention left anatomical snuff box acess;  Surgeon: Omkar Charles DO;  Location:  [facility] CATH INVASIVE LOCATION;  Service: Cardiology;  Laterality: Left;    CARDIAC SURGERY      CATARACT EXTRACTION      CERVICAL SPINE SURGERY      COLONOSCOPY N/A 01/31/2017    Normal exam repeat in 5 years    COLONOSCOPY N/A 02/11/2019    Mild acute inflammation    COLONOSCOPY W/ POLYPECTOMY  03/04/2014    Hyperplastic polyp    CORONARY ARTERY BYPASS GRAFT  10/2015    ENDOSCOPY  04/13/2011    Gastritis with hemorrhage    ENDOSCOPY N/A 05/05/2017    Normal exam    ENDOSCOPY N/A 02/11/2019    Gastritis    ENDOSCOPY N/A 09/01/2020    Non-erosive gastritis with hemorrhage    ENDOSCOPY N/A  "02/10/2021    Esophagitis    FOOT SURGERY Left     INCISION AND DRAINAGE OF WOUND Left 2007    spider bite     Family History   Problem Relation Age of Onset    Colon cancer Father     Heart disease Father     Colon cancer Sister     Colon polyps Sister     Alzheimer's disease Mother     Coronary artery disease Sister     Coronary artery disease Sister      Social History     Socioeconomic History    Marital status:    Tobacco Use    Smoking status: Former     Current packs/day: 0.00     Types: Cigarettes     Quit date:      Years since quittin.2    Smokeless tobacco: Never    Tobacco comments:     smoked in highschool   Vaping Use    Vaping status: Never Used   Substance and Sexual Activity    Alcohol use: No    Drug use: No    Sexual activity: Defer     Allergies   Allergen Reactions    Cefepime Hives and Anaphylaxis    Bactrim [Sulfamethoxazole-Trimethoprim] Other (See Comments)     \"RENAL FAILURE\"    Vancomycin Itching    Zolpidem Unknown - High Severity     \"makes him crazy\"    Zolpidem Tartrate Unknown - Low Severity and Provider Review Needed    Metronidazole Rash     Current Facility-Administered Medications   Medication Dose Route Frequency Provider Last Rate Last Admin    acetaminophen (TYLENOL) tablet 650 mg  650 mg Oral Q4H PRN Raquel Duncan APRN   650 mg at 24 1005    Or    acetaminophen (TYLENOL) 160 MG/5ML oral solution 650 mg  650 mg Oral Q4H PRN Raquel Duncan APRN   650 mg at 24 2109    Or    acetaminophen (TYLENOL) suppository 650 mg  650 mg Rectal Q4H PRN Raquel Duncan, APRN        apixaban (ELIQUIS) tablet 5 mg  5 mg Oral Q12H Raquel Duncan, APRN   5 mg at 24 1036    ascorbic acid (VITAMIN C) tablet 1,000 mg  1,000 mg Oral Daily Rene Maloney MD   1,000 mg at 24 1048    aspirin EC tablet 81 mg  81 mg Oral Daily Rene Maloney MD   81 mg at 24 1048    sennosides-docusate (PERICOLACE) 8.6-50 MG per tablet 1 tablet  1 " tablet Oral BID Raquel Duncan APRN   1 tablet at 03/26/24 2104    And    polyethylene glycol (MIRALAX) packet 17 g  17 g Oral Daily PRN Raquel Duncan APRN        And    bisacodyl (DULCOLAX) EC tablet 5 mg  5 mg Oral Daily PRN Raquel Duncan APRN        And    bisacodyl (DULCOLAX) suppository 10 mg  10 mg Rectal Daily PRN Raquel Duncan APRN        bumetanide (BUMEX) tablet 1 mg  1 mg Oral Daily Raquel Duncan APRN   1 mg at 03/28/24 1036    calcitriol (ROCALTROL) capsule 0.5 mcg  0.5 mcg Oral Daily Rene Maloney MD   0.5 mcg at 03/28/24 1048    carvedilol (COREG) tablet 3.125 mg  3.125 mg Oral BID With Meals Rene Maloney MD        dextrose (D50W) (25 g/50 mL) IV injection 25 g  25 g Intravenous Q15 Min PRN Raquel Duncan APRN        dextrose (GLUTOSE) oral gel 15 g  15 g Oral Q15 Min PRN Raquel Duncan APRN   15 g at 03/27/24 1710    Diclofenac Sodium (VOLTAREN) 1 % gel 2 g  2 g Topical 4x Daily PRN Rene Maloney MD        donepezil (ARICEPT) tablet 10 mg  10 mg Oral Daily Raquel Duncan APRN   10 mg at 03/28/24 1036    famotidine (PEPCID) tablet 20 mg  20 mg Oral BID AC Rene Maloney MD   20 mg at 03/28/24 1048    glucagon (GLUCAGEN) injection 1 mg  1 mg Intramuscular Q15 Min PRN Raquel Duncan APRN        haloperidol lactate (HALDOL) injection 5 mg  5 mg Intravenous Q6H PRN Rene Maloney MD        insulin detemir (LEVEMIR) injection 30 Units  30 Units Subcutaneous Nightly Raquel Duncan APRN   30 Units at 03/27/24 2034    Insulin Lispro (humaLOG) injection 4-24 Units  4-24 Units Subcutaneous 4x Daily AC & at Bedtime Raquel Duncan APRN   4 Units at 03/27/24 0908    insulin regular (humuLIN R,novoLIN R) injection 10 Units  10 Units Subcutaneous TID AC Steve De Jesus MD   10 Units at 03/27/24 1151    isosorbide mononitrate (IMDUR) 24 hr tablet 30 mg  30 mg Oral Q24H Rene Maloney MD   30 mg at 03/28/24 1049    Linezolid  (ZYVOX) 600 mg 300 mL  600 mg Intravenous Q12H Raquel Duncan APRN 300 mL/hr at 03/28/24 1307 600 mg at 03/28/24 1307    LORazepam (ATIVAN) injection 1 mg  1 mg Intravenous Q4H PRN Rene Maloney MD   1 mg at 03/27/24 2240    magnesium hydroxide (MILK OF MAGNESIA) suspension 10 mL  10 mL Oral Daily PRN Rene Maloney MD        melatonin tablet 6 mg  6 mg Oral Nightly Rene Maloney MD   6 mg at 03/27/24 2029    midodrine (PROAMATINE) tablet 2.5 mg  2.5 mg Oral TID AC Rene Maloney MD   2.5 mg at 03/28/24 1052    nitroglycerin (NITROSTAT) SL tablet 0.4 mg  0.4 mg Sublingual Q5 Min PRN Raquel Duncan APRN        ondansetron ODT (ZOFRAN-ODT) disintegrating tablet 4 mg  4 mg Oral Q6H PRN Raquel Duncan APRN        Or    ondansetron (ZOFRAN) injection 4 mg  4 mg Intravenous Q6H PRN Raquel Duncan APRN        oxyCODONE (ROXICODONE) immediate release tablet 10 mg  10 mg Oral BID PRN Raquel Duncan APRN   10 mg at 03/28/24 0111    pregabalin (LYRICA) capsule 100 mg  100 mg Oral Nightly Rene Maloney MD        pregabalin (LYRICA) capsule 50 mg  50 mg Oral Daily Rene Maloney MD   50 mg at 03/28/24 1056    rosuvastatin (CRESTOR) tablet 10 mg  10 mg Oral Nightly Rene Maloney MD        sodium chloride 0.9 % flush 10 mL  10 mL Intravenous PRN Steve De Jesus MD        sodium chloride 0.9 % flush 10 mL  10 mL Intravenous Q12H Raquel Duncan APRN   10 mL at 03/28/24 1040    sodium chloride 0.9 % flush 10 mL  10 mL Intravenous PRN Raquel Duncan APRN        sodium chloride 0.9 % infusion 40 mL  40 mL Intravenous PRN Raquel Duncan APRN        sodium hypochlorite (DAKIN'S 1/4 STRENGTH) 0.125 % topical solution 0.125% solution   Topical BID Raquel Duncan APRN   Given at 03/27/24 1001    sucralfate (CARAFATE) 1 GM/10ML suspension 1 g  1 g Oral TID AC Rene Maloney MD   1 g at 03/28/24 1050    tamsulosin (FLOMAX) 24 hr capsule 0.4 mg  0.4 mg Oral  Daily Rene Maloney MD   0.4 mg at 03/28/24 1050    timolol (TIMOPTIC) 0.25 % ophthalmic solution 1 drop  1 drop Both Eyes Q12H Rene Maloney MD        vitamin D3 capsule 5,000 Units  5,000 Units Oral Daily Rene Maloney MD   5,000 Units at 03/28/24 1052    zinc sulfate (ZINCATE) capsule 220 mg  220 mg Oral Daily Rene Maloney MD   220 mg at 03/28/24 1052     Review of Systems   Constitutional:  Negative for activity change.   HENT:  Negative for congestion.    Respiratory:  Negative for apnea and shortness of breath.    Gastrointestinal:  Negative for abdominal pain.   Musculoskeletal:  Positive for arthralgias. Negative for back pain.   Skin:  Positive for wound.   Neurological:  Positive for numbness.   Psychiatric/Behavioral:  Negative for agitation, behavioral problems and confusion.        OBJECTIVE     Vitals:    03/28/24 1645   BP: 119/54   Pulse: 95   Resp: 18   Temp: 98.2 °F (36.8 °C)   SpO2: 99%       PHYSICAL EXAM  GEN:   Pt presents in hospital bed. Accompanied by none.     Foot/Ankle Exam    GENERAL  Appearance:  appears stated age  Orientation:  AAOx3  Affect:  appropriate    VASCULAR     Right Foot Vascularity   Dorsalis pedis:  2+  Posterior tibial:  2+  Skin temperature:  warm  Edema grading:  Trace  CFT:  3  Varicosities:  none     Left Foot Vascularity   Posterior tibial:  2+  Skin temperature:  warm  Edema grading:  Trace  CFT:  3  Pedal hair growth:  Absent  Varicosities:  none     NEUROLOGIC     Right Foot Neurologic   Light touch sensation: diminished  Vibratory sensation: diminished  Hot/Cold sensation: diminished     Left Foot Neurologic   Light touch sensation: diminished  Vibratory sensation: diminished    MUSCULOSKELETAL     Right Foot Musculoskeletal   Tenderness:  none       Left Foot Musculoskeletal    Amputation   Toes amputated: fifth toe  Tenderness:  none    MUSCLE STRENGTH     Right Foot Muscle Strength   Foot dorsiflexion:  5  Foot plantar flexion:   5  Foot inversion:  5  Foot eversion:  5     Left Foot Muscle Strength   Foot dorsiflexion:  4+  Foot plantar flexion:  4+  Foot inversion:  4+  Foot eversion:  4+    DERMATOLOGIC        Left Foot Dermatologic   Skin  Positive for wound.      Left foot additional comments: Multiple wounds  See photos    Image:                  RADIOLOGY/NUCLEAR:  XR Foot 3+ View Left    Result Date: 3/26/2024  Narrative: EXAMINATION: XR FOOT 3+ VW LEFT-  3/26/2024 4:19 PM  HISTORY: foot infection  3 view LEFT foot exam.  Previous amputation of the mid/distal fifth metatarsal.  Interval resection or resorption of the fourth metatarsal head.  No bone destruction or soft tissue air is seen.  High-grade degenerative disease at the first metatarsal phalangeal joint.  Unchanged appearance of prominent dorsal midfoot spurring and prominent inferior calcaneal spurring.  Mild soft tissue edema over the dorsum of the foot is less prominent as compared with the previous exam.      Impression: 1. No bone destruction or soft tissue air is seen. 2. Prominent degenerative spurring.    This report was signed and finalized on 3/26/2024 5:25 PM by Dr. Gomez Mcgill MD.      XR Chest 1 View    Result Date: 3/26/2024  Narrative: EXAM: XR CHEST 1 VW-  DATE: 3/26/2024 1:15 PM  HISTORY: ams   COMPARISON: 8/28/2023.  TECHNIQUE:  Frontal view(s) of the chest submitted.  FINDINGS:  Patient is status post median sternotomy and CABG. There is enlargement of the cardiac silhouette. There are low lung volumes with vascular crowding versus mild volume overload. No effusion or pneumothorax is seen.       Impression:  1. Postoperative chest and low lung volumes with vascular crowding versus mild volume overload.  This report was signed and finalized on 3/26/2024 2:34 PM by Eran Christina.      CT Head Without Contrast    Result Date: 3/26/2024  Narrative: EXAM: CT HEAD WO CONTRAST-  DATE: 3/26/2024 1:03 PM  HISTORY: ams   COMPARISON: 10/10/2023.  DOSE LENGTH  PRODUCT: 876.8 mGy.cm  Automated exposure control was also utilized to decrease patient radiation dose.  TECHNIQUE: Unenhanced CT images obtained from vertex to skull base with multiplanar reformats.  FINDINGS: There is no acute intracranial hemorrhage, midline shift, mass effect, or hydrocephalus. There is no CT evidence for acute infarct.  There are chronic changes with volume loss and chronic small vessel ischemic change of the periventricular white matter.  SOFT TISSUES: The scalp soft tissues are unremarkable.   SINUS: There is partially imaged mucosal thickening at the right maxillary sinus.  ORBITS: The visualized orbits and globes are unremarkable. There is bilateral lens extraction.       Impression: 1. Chronic changes and no acute intracranial findings.  This report was signed and finalized on 3/26/2024 2:10 PM by Eran Christina.       LABORATORY/CULTURE RESULTS:  Results from last 7 days   Lab Units 03/28/24  1004 03/27/24  0449 03/26/24  1326   WBC 10*3/mm3 9.45 12.75* 14.69*   HEMOGLOBIN g/dL 13.7 11.8* 11.3*   HEMATOCRIT % 41.1 35.9* 33.8*   PLATELETS 10*3/mm3 154 163 171     Results from last 7 days   Lab Units 03/28/24  1004 03/27/24  0449 03/26/24  1326   SODIUM mmol/L 140 142 140   POTASSIUM mmol/L 3.6 3.5 3.4*   CHLORIDE mmol/L 102 103 103   CO2 mmol/L 22.0 25.0 23.0   BUN mg/dL 36* 29* 29*   CREATININE mg/dL 2.03* 2.07* 1.93*   CALCIUM mg/dL 9.2 8.9 9.5   BILIRUBIN mg/dL  --   --  0.7   ALK PHOS U/L  --   --  91   ALT (SGPT) U/L  --   --  8   AST (SGOT) U/L  --   --  13   GLUCOSE mg/dL 79 169* 438*     Results from last 7 days   Lab Units 03/26/24  1326   INR  1.07     Microbiology Results (last 10 days)       Procedure Component Value - Date/Time    CANDIDA AURIS SCREEN - Swab, Axilla Right, Axilla Left and Groin [[id number]]  (Normal) Collected: 03/27/24 0351    Lab Status: Preliminary result Specimen: Swab from Axilla Right, Axilla Left and Groin Updated: 03/28/24 0415     Candida Auris  Screen Culture No Candida auris isolated at 24 hours    Respiratory Panel PCR w/COVID-19(SARS-CoV-2) [facility]HAEL/[facility]USH/[facility] In-House, NP Swab in UTM/VTM, 2 HR TAT - Swab, Nasopharynx [[id number]]  (Normal) Collected: 03/26/24 1732    Lab Status: Final result Specimen: Swab from Nasopharynx Updated: 03/26/24 1905     ADENOVIRUS, PCR Not Detected     Coronavirus 229E Not Detected     Coronavirus HKU1 Not Detected     Coronavirus NL63 Not Detected     Coronavirus OC43 Not Detected     COVID19 Not Detected     Human Metapneumovirus Not Detected     Human Rhinovirus/Enterovirus Not Detected     Influenza A PCR Not Detected     Influenza B PCR Not Detected     Parainfluenza Virus 1 Not Detected     Parainfluenza Virus 2 Not Detected     Parainfluenza Virus 3 Not Detected     Parainfluenza Virus 4 Not Detected     RSV, PCR Not Detected     Bordetella pertussis pcr Not Detected     Bordetella parapertussis PCR Not Detected     Chlamydophila pneumoniae PCR Not Detected     Mycoplasma pneumo by PCR Not Detected    Narrative:      In the setting of a positive respiratory panel with a viral infection PLUS a negative procalcitonin without other underlying concern for bacterial infection, consider observing off antibiotics or discontinuation of antibiotics and continue supportive care. If the respiratory panel is positive for atypical bacterial infection (Bordetella pertussis, Chlamydophila pneumoniae, or Mycoplasma pneumoniae), consider antibiotic de-escalation to target atypical bacterial infection.    Blood Culture - Blood, Arm, Right [[id number]]  (Abnormal) Collected: 03/26/24 1346    Lab Status: Preliminary result Specimen: Blood from Arm, Right Updated: 03/28/24 0547     Blood Culture Staphylococcus aureus, MRSA     Comment:   Infectious disease consultation is highly recommended to rule out distant foci of infection.  Methicillin resistant Staphylococcus aureus, Patient may be an isolation risk.        Isolated from  Aerobic and Anaerobic Bottles     Gram Stain Aerobic Bottle Gram positive cocci      Anaerobic Bottle Gram positive cocci    Blood Culture - Blood, Arm, Left [[id number]]  (Abnormal) Collected: 03/26/24 1346    Lab Status: Preliminary result Specimen: Blood from Arm, Left Updated: 03/28/24 0547     Blood Culture Staphylococcus aureus, MRSA     Comment:   Infectious disease consultation is highly recommended to rule out distant foci of infection.  Methicillin resistant Staphylococcus aureus, Patient may be an isolation risk.        Isolated from Aerobic and Anaerobic Bottles     Gram Stain Aerobic Bottle Gram positive cocci      Anaerobic Bottle Gram positive cocci    Blood Culture ID, PCR - Blood, Arm, Left [[id number]]  (Abnormal) Collected: 03/26/24 1346    Lab Status: Final result Specimen: Blood from Arm, Left Updated: 03/27/24 0426     BCID, PCR Staph aureus. mecA/C and MREJ (methicillin resistance gene) detected. Identification by BCID2 PCR.     BOTTLE TYPE Aerobic Bottle    Narrative:      Infectious disease consultation is highly recommended to rule out distant foci of infection.            PATHOLOGY RESULTS:       ASSESSMENT/PLAN   Diabetic foot ulcerations to left foot  Type 2 diabetes mellitus with hyperglycemia  Diabetic polyneuropathy  Sepsis   Metabolic encephalopathy secondary to sepsis-improving    Continue wound care twice daily with Betadine soaked gauze wet-to-dry to be changed by nursing.    Awaiting MRI of left foot to be performed to determine presence of osteomyelitis or abscess.    Antibiotic therapy per infectious disease.    Will continue to follow patient while admitted.      This document has been electronically signed by SUSAN Perez on March 28, 2024 17:09 CDT            Electronically signed by Adelaida Vines APRN at 03/28/24 6827

## 2024-03-29 NOTE — PROGRESS NOTES
Nephrology ([facility]) Progress Note      Patient:  Erick Luong  YOB: 1955  Date of Service: 3/29/2024  MRN: [id number]   Acct: [id number]   Primary Care Physician: Del Shetty MD  Advance Directive:   Code Status and Medical Interventions:   Ordered at: 03/26/24 1815     Level Of Support Discussed With:    Health Care Surrogate     Code Status (Patient has no pulse and is not breathing):    CPR (Attempt to Resuscitate)     Medical Interventions (Patient has pulse or is breathing):    Full Support     Admit Date: 3/26/2024       Hospital Day: 3  Referring Provider: No ref. provider found      Patient personally seen and examined.  Complete chart including Consults, Notes, Operative Reports, Labs, Cardiology, and Radiology studies reviewed as able.        Subjective:  Erick Luong is a 68 y.o. male for whom we were consulted for evaluation and treatment of acute kidney injury.  He has stage IIIb chronic kidney disease baseline, follows with Dr Lomas in our office.  Baseline creatinine approximately 1.8. He has history of insulin-dependent type 2 diabetes, hypertension, abdominal obesity, obstructive sleep apnea, diabetic foot ulcer and coronary artery disease.   Patient presented to ER on 3/26 with altered mental status. Had debridement of ongoing wound on left foot the day prior. Left foot warm and erythremic on arrival to ER. Noted to have elevated blood glucose over 400. Initial creatinine of 1.9.  Admitted to medical floor for sepsis due to left foot wound. Patient has been agitated and confused during the admission, requiring wrist restraints due to pulling at lines and tubes.     Today is more awake and oriented, no longer restrained. No new complaint or overnight issues. Urine output nonoliguric     Allergies:  Cefepime, Bactrim [sulfamethoxazole-trimethoprim], Vancomycin, Zolpidem, Zolpidem tartrate, and Metronidazole    Home Meds:  Medications Prior to  Admission   Medication Sig Dispense Refill Last Dose    amitriptyline (ELAVIL) 25 MG tablet Take 2 tablets by mouth Daily at bedtime. (Patient not taking: Reported on 3/27/2024) 60 tablet 1 Not Taking    apixaban (ELIQUIS) 5 MG tablet tablet Take 1 tablet by mouth Every 12 (Twelve) Hours.       ascorbic acid (VITAMIN C) 1000 MG tablet Take 1 tablet by mouth Daily. 30 tablet 3     Aspirin 81 MG capsule Take 81 mg by mouth Daily.       bumetanide (BUMEX) 1 MG tablet Take 1 tablet every day by oral route for 30 days. 30 tablet 0     bumetanide (BUMEX) 2 MG tablet Take 1 tablet by mouth Daily. Take 2 tablets in morning;1 tablet at night (Patient not taking: Reported on 3/27/2024)   Not Taking    busPIRone (BUSPAR) 10 MG tablet Take 1 tablet by mouth 3 (Three) Times a Day.       calcitriol (ROCALTROL) 0.5 MCG capsule Take 1 capsule by mouth Daily. 90 capsule 4     calcitriol (ROCALTROL) 0.5 MCG capsule Take 1 capsule every day by oral route for 90 days. (Patient not taking: Reported on 3/27/2024) 90 capsule 4 Not Taking    carvedilol (COREG) 3.125 MG tablet Take 1 tablet twice a day by oral route. 60 tablet 4     carvedilol (COREG) 6.25 MG tablet Take 1 tablet by mouth 2 (Two) Times a Day. (Patient not taking: Reported on 3/27/2024) 180 tablet 4 Not Taking    Cholecalciferol (D-5000) 125 MCG (5000 UT) tablet Take 1 tablet by mouth Daily Before Lunch. 30 tablet 2     cloNIDine (CATAPRES) 0.1 MG tablet Take 1 tablet by mouth Every 12 (Twelve) Hours. (Patient not taking: Reported on 3/27/2024) 60 tablet 2 Not Taking    Diclofenac Sodium (VOLTAREN) 1 % gel gel Apply 2 g topically to the appropriate area as directed 4 (Four) Times a Day As Needed. 300 g 11     Diclofenac Sodium (VOLTAREN) 1 % gel gel Apply 2 g topically to the appropriate area as directed 4 (Four) Times a Day. (Patient not taking: Reported on 3/27/2024) 300 g 11 Not Taking    donepezil (ARICEPT) 10 MG tablet Take 1 tablet by mouth Daily. (Patient not taking:  Reported on 3/27/2024) 90 tablet 4 Not Taking    Dulaglutide (Trulicity) 4.5 MG/0.5ML solution pen-injector Inject 0.5 mL under the skin into the appropriate area as directed 1 (One) Time Per Week. (Patient not taking: Reported on 3/27/2024) 2 mL 11 Not Taking    DULoxetine (CYMBALTA) 60 MG capsule Take 1 capsule by mouth Daily. 90 capsule 4     DULoxetine (CYMBALTA) 60 MG capsule Take 1 capsule by mouth Daily. (Patient not taking: Reported on 3/27/2024) 90 capsule 4 Not Taking    DULoxetine (CYMBALTA) 60 MG capsule Take 1 capsule by mouth Daily. (Patient not taking: Reported on 3/27/2024) 90 capsule 4 Not Taking    empagliflozin (JARDIANCE) 25 MG tablet tablet Take 1 tablet by mouth Daily. (Patient not taking: Reported on 3/27/2024) 30 tablet 2 Not Taking    famotidine (PEPCID) 20 MG tablet Take 1 tablet twice a day by oral route. 180 tablet 4     fluticasone (FLONASE) 50 MCG/ACT nasal spray 1 spray into the nostril(s) as directed by provider Daily. (Patient taking differently: 1 spray into the nostril(s) as directed by provider 2 (Two) Times a Day.) 16 g 1     HYDROcodone-acetaminophen (NORCO) 7.5-325 MG per tablet Take 1 tablet by mouth 2 (Two) Times a Day As Needed. (Patient not taking: Reported on 3/27/2024) 40 tablet 0 Not Taking    Insulin Glargine (Lantus SoloStar) 100 UNIT/ML injection pen Inject 20 Units under the skin into the appropriate area as directed Every Evening. 15 mL 3     Insulin Regular Human, Conc, (HumuLIN R U-500 KwikPen) 500 UNIT/ML solution pen-injector CONCENTRATED injection Inject 120 Units under the skin into the appropriate area as directed 2 (Two) Times a Day with breakfast and dinner. (Patient not taking: Reported on 3/27/2024) 18 mL 5 Not Taking    Insulin Regular Human, Conc, (HumuLIN R U-500 KwikPen) 500 UNIT/ML solution pen-injector CONCENTRATED injection Inject 40 Units under the skin into the appropriate area with regular meals AND 40 Units with large meals. 54 mL 3      isosorbide mononitrate (IMDUR) 30 MG 24 hr tablet Take 1 tablet by mouth Daily.       magnesium hydroxide (Milk of Magnesia) 400 MG/5ML suspension Take 30 mL every day by oral route for 30 days. 900 mL 0     magnesium hydroxide (Milk of Magnesia) 400 MG/5ML suspension Take 30mL by mouth once daily for 30 days. (Patient not taking: Reported on 3/27/2024) 900 mL 0 Not Taking    melatonin 3 MG tablet Take 1 tablet by mouth At Night As Needed for Sleep.       methylcellulose (Citrucel) oral powder Mix 5g as directed and drink twice daily for 90 days. 900 g 10     metoclopramide (REGLAN) 5 MG tablet Take 1 tablet by mouth 3 times a day.       midodrine (PROAMATINE) 2.5 MG tablet Take 1 tablet by mouth 3 (Three) Times a Day Before Meals.       nitroglycerin (NITROSTAT) 0.4 MG SL tablet Dissolve 1 tablet by mouth every 5 minutes as needed for chest pain; MAX 3 tabs/24 hours.  If no improvement after 3 tabs go to ER 25 tablet 0     ondansetron (ZOFRAN) 4 MG tablet Take 1 tablet by mouth Every 6 (Six) Hours As Needed for Nausea or Vomiting.       oxyCODONE (ROXICODONE) 10 MG tablet Take 1 tablet by mouth twice a day as needed 60 tablet 0     pantoprazole (Protonix) 40 MG EC tablet Take 1 tablet by mouth 2 (Two) Times a Day. (Patient not taking: Reported on 3/27/2024) 180 tablet 4 Not Taking    polyethylene glycol (MIRALAX) 17 g packet Take 17 g by mouth Daily. Obtain OTC       prazosin (MINIPRESS) 1 MG capsule Take 1 capsule by mouth every night at bedtime. 30 capsule 2     pregabalin (LYRICA) 100 MG capsule Take 2 capsules by mouth Every Night.       pregabalin (LYRICA) 50 MG capsule Take 1 capsule by mouth Daily.       rosuvastatin (CRESTOR) 10 MG tablet Take 1 tablet by mouth Daily.       sennosides-docusate (PERICOLACE) 8.6-50 MG per tablet Take 1 tablet by mouth Every Night. Obtain OTC       sennosides-docusate (PERICOLACE) 8.6-50 MG per tablet Take 1 tablet by mouth every night at bedtime. (Patient not taking: Reported  on 3/27/2024) 30 tablet 2 Not Taking    sodium hypochlorite (DAKIN'S 1/4 STRENGTH) 0.125 % solution topical solution 0.125% Apply to affected area twice daily (Patient not taking: Reported on 3/27/2024) 473 mL 3 Not Taking    sodium hypochlorite (DAKIN'S 1/4 STRENGTH) 0.125 % solution topical solution 0.125% Apply to affected area twice daily as directed (Patient not taking: Reported on 3/27/2024) 473 mL 5 Not Taking    sodium hypochlorite (DAKIN'S) 0.25 % topical solution Use to wound daily as instructed 473 mL 1     sucralfate (CARAFATE) 1 g tablet Take 1 tablet by mouth 3 times a day.       tamsulosin (FLOMAX) 0.4 MG capsule 24 hr capsule Take 1 capsule by mouth Daily. 90 capsule 1     timolol (TIMOPTIC) 0.5 % ophthalmic solution Administer 1 drop to both eyes 2 (Two) Times a Day.       valsartan (DIOVAN) 40 MG tablet Take 1 tablet by mouth Daily.       zinc sulfate (ZINCATE) 50 MG capsule Take 1 capsule by mouth Daily.          Medicines:  Current Facility-Administered Medications   Medication Dose Route Frequency Provider Last Rate Last Admin    acetaminophen (TYLENOL) tablet 650 mg  650 mg Oral Q4H PRN Raquel Duncan APRN   650 mg at 03/27/24 1005    Or    acetaminophen (TYLENOL) 160 MG/5ML oral solution 650 mg  650 mg Oral Q4H PRN Raquel Duncan APRN   650 mg at 03/27/24 2109    Or    acetaminophen (TYLENOL) suppository 650 mg  650 mg Rectal Q4H PRN Raquel Duncan APRN        apixaban (ELIQUIS) tablet 5 mg  5 mg Oral Q12H Raquel Duncan APRN   5 mg at 03/29/24 0826    ascorbic acid (VITAMIN C) tablet 1,000 mg  1,000 mg Oral Daily Rene Maloney MD   1,000 mg at 03/29/24 0826    aspirin EC tablet 81 mg  81 mg Oral Daily Rene Maloney MD   81 mg at 03/29/24 0826    sennosides-docusate (PERICOLACE) 8.6-50 MG per tablet 1 tablet  1 tablet Oral BID Raquel Duncan APRN   1 tablet at 03/28/24 2126    And    polyethylene glycol (MIRALAX) packet 17 g  17 g Oral Daily PRN Raquel Duncan  SUSAN FLANNERY        And    bisacodyl (DULCOLAX) EC tablet 5 mg  5 mg Oral Daily PRN Raquel Duncan APRN        And    bisacodyl (DULCOLAX) suppository 10 mg  10 mg Rectal Daily PRN Raquel Duncan APRN        bumetanide (BUMEX) tablet 1 mg  1 mg Oral Daily Raquel Duncan APRN   1 mg at 03/29/24 0826    calcitriol (ROCALTROL) capsule 0.5 mcg  0.5 mcg Oral Daily Rene Maloney MD   0.5 mcg at 03/29/24 0826    carvedilol (COREG) tablet 3.125 mg  3.125 mg Oral BID With Meals Rene Maloney MD   3.125 mg at 03/28/24 1836    dextrose (D50W) (25 g/50 mL) IV injection 25 g  25 g Intravenous Q15 Min PRN Raquel Duncan APRN        dextrose (GLUTOSE) oral gel 15 g  15 g Oral Q15 Min PRN Raquel Duncan APRN   15 g at 03/27/24 1710    Diclofenac Sodium (VOLTAREN) 1 % gel 2 g  2 g Topical 4x Daily PRN Rene Maloney MD        donepezil (ARICEPT) tablet 10 mg  10 mg Oral Daily Raquel Duncan APRN   10 mg at 03/29/24 0826    famotidine (PEPCID) tablet 20 mg  20 mg Oral BID AC Rene Maloney MD   20 mg at 03/29/24 0826    glucagon (GLUCAGEN) injection 1 mg  1 mg Intramuscular Q15 Min PRN Raquel Duncan APRN        haloperidol lactate (HALDOL) injection 5 mg  5 mg Intravenous Q6H PRN Rene Maloney MD        insulin detemir (LEVEMIR) injection 30 Units  30 Units Subcutaneous Nightly Raquel Duncan APRN   30 Units at 03/28/24 2125    Insulin Lispro (humaLOG) injection 4-24 Units  4-24 Units Subcutaneous 4x Daily AC & at Bedtime Raquel Duncan APRN   4 Units at 03/28/24 1838    insulin regular (humuLIN R,novoLIN R) injection 10 Units  10 Units Subcutaneous TID AC Steve De Jesus MD   10 Units at 03/29/24 0826    isosorbide mononitrate (IMDUR) 24 hr tablet 30 mg  30 mg Oral Q24H Rene Maloney MD   30 mg at 03/29/24 0826    Linezolid (ZYVOX) 600 mg 300 mL  600 mg Intravenous Q12H Raquel Duncan APRN 300 mL/hr at 03/29/24 0020 600 mg at 03/29/24 0020    LORazepam  (ATIVAN) injection 1 mg  1 mg Intravenous Q4H PRN Rene Maloney MD   1 mg at 03/29/24 0140    magnesium hydroxide (MILK OF MAGNESIA) suspension 10 mL  10 mL Oral Daily PRN Rene Maloney MD        melatonin tablet 6 mg  6 mg Oral Nightly Rene Maloney MD   6 mg at 03/28/24 2126    midodrine (PROAMATINE) tablet 2.5 mg  2.5 mg Oral TID AC Rene Maloney MD   2.5 mg at 03/29/24 0826    mupirocin (BACTROBAN) 2 % nasal ointment 1 Application  1 Application Each Nare BID Nadia Polo, APRN   1 Application at 03/29/24 0826    nitroglycerin (NITROSTAT) SL tablet 0.4 mg  0.4 mg Sublingual Q5 Min PRN Raquel Duncan, SUSAN        ondansetron ODT (ZOFRAN-ODT) disintegrating tablet 4 mg  4 mg Oral Q6H PRN Raquel Duncan APRN        Or    ondansetron (ZOFRAN) injection 4 mg  4 mg Intravenous Q6H PRN Raquel Duncan, APRSHERRILL   4 mg at 03/28/24 1314    oxyCODONE (ROXICODONE) immediate release tablet 10 mg  10 mg Oral BID PRN Raquel Duncan APRN   10 mg at 03/28/24 2200    pregabalin (LYRICA) capsule 100 mg  100 mg Oral Nightly Rene Maloney MD   100 mg at 03/28/24 2126    pregabalin (LYRICA) capsule 50 mg  50 mg Oral Daily Rene Maloney MD   50 mg at 03/29/24 0826    rosuvastatin (CRESTOR) tablet 10 mg  10 mg Oral Nightly Rene Maloney MD   10 mg at 03/28/24 2126    sodium chloride 0.9 % flush 10 mL  10 mL Intravenous PRN Steve De Jesus MD        sodium chloride 0.9 % flush 10 mL  10 mL Intravenous Q12H Raquel Duncan APRN   10 mL at 03/29/24 0828    sodium chloride 0.9 % flush 10 mL  10 mL Intravenous PRN Raquel Duncan, SUSAN        sodium chloride 0.9 % infusion 40 mL  40 mL Intravenous PRN Raquel Duncan, APRN        sucralfate (CARAFATE) 1 GM/10ML suspension 1 g  1 g Oral TID AC Rene Maloney MD   1 g at 03/29/24 0827    tamsulosin (FLOMAX) 24 hr capsule 0.4 mg  0.4 mg Oral Daily Rene Maloney MD   0.4 mg at 03/29/24 0826    timolol (TIMOPTIC)  0.25 % ophthalmic solution 1 drop  1 drop Both Eyes Q12H Rene Maloney MD   1 drop at 03/29/24 0828    vitamin D3 capsule 5,000 Units  5,000 Units Oral Daily Rene Maloney MD   5,000 Units at 03/29/24 0826    zinc sulfate (ZINCATE) capsule 220 mg  220 mg Oral Daily Rene Maloney MD   220 mg at 03/29/24 0827       Past Medical History:  Past Medical History:   Diagnosis Date    Arthritis     Autonomic disease     CAD (coronary artery disease) 02/06/2017    Cervical radiculopathy 09/16/2021    Chronic constipation with acute exaccerbation 05/10/2021    Coronary artery disease     Degeneration of cervical intervertebral disc 08/11/2021    Diabetes mellitus     Diabetic foot ulcer 08/31/2020    Diabetic polyneuropathy associated with type 2 diabetes mellitus 01/18/2021    Elevated cholesterol     Gastroesophageal reflux disease 05/13/2019    Headache     HTN (hypertension), benign 05/03/2017    Hyperlipidemia     Hypertension     Mixed hyperlipidemia 02/07/2017    Multiple lung nodules 01/26/2020    5mm, 9 mm RLL identified 1/2020, not present 10/2019.    Myocardial infarction     Osteomyelitis 01/22/2020    Osteomyelitis of fifth toe of right foot 10/07/2019    Pancreatitis     Persistent insomnia 01/20/2020    Renal disorder     Sleep apnea 02/06/2017    Sleep apnea with use of continuous positive airway pressure (CPAP)     NON-COMPLIANT    Slow transit constipation 01/16/2019    Spinal stenosis in cervical region 09/16/2021    Vitamin D deficiency 03/02/2021       Past Surgical History:  Past Surgical History:   Procedure Laterality Date    ABDOMINAL SURGERY      AMPUTATION FOOT / TOE Left 10/2021    5th digit     ANTERIOR CERVICAL DISCECTOMY W/ FUSION N/A 8/5/2022    Procedure: CERVICAL DISCECTOMY ANTERIOR WITH FUSION C5-6 with possible plating of C5-7 with neuromonitoring and 1 c-arm;  Surgeon: Karel Soliz MD;  Location: [facility];  Service: Neurosurgery;  Laterality: N/A;    APPENDECTOMY       BACK SURGERY      CARDIAC CATHETERIZATION Left 2021    Procedure: Left Heart Cath w poss intervention left anatomical snuff box acess;  Surgeon: Omkar Charles DO;  Location:  [facility];  Service: Cardiology;  Laterality: Left;    CARDIAC SURGERY      CATARACT EXTRACTION      CERVICAL SPINE SURGERY      COLONOSCOPY N/A 2017    Normal exam repeat in 5 years    COLONOSCOPY N/A 2019    Mild acute inflammation    COLONOSCOPY W/ POLYPECTOMY  2014    Hyperplastic polyp    CORONARY ARTERY BYPASS GRAFT  10/2015    ENDOSCOPY  2011    Gastritis with hemorrhage    ENDOSCOPY N/A 2017    Normal exam    ENDOSCOPY N/A 2019    Gastritis    ENDOSCOPY N/A 2020    Non-erosive gastritis with hemorrhage    ENDOSCOPY N/A 02/10/2021    Esophagitis    FOOT SURGERY Left     INCISION AND DRAINAGE OF WOUND Left 2007    spider bite       Family History  Family History   Problem Relation Age of Onset    Colon cancer Father     Heart disease Father     Colon cancer Sister     Colon polyps Sister     Alzheimer's disease Mother     Coronary artery disease Sister     Coronary artery disease Sister        Social History  Social History     Socioeconomic History    Marital status:    Tobacco Use    Smoking status: Former     Current packs/day: 0.00     Types: Cigarettes     Quit date:      Years since quittin.2    Smokeless tobacco: Never    Tobacco comments:     smoked [facility] [employer]   Vaping Use    Vaping status: Never Used   Substance and Sexual Activity    Alcohol use: No    Drug use: No    Sexual activity: Defer       Review of Systems:  History obtained from chart review and the patient  General ROS: No fever or chills  Respiratory ROS: No cough, shortness of breath, wheezing  Cardiovascular ROS: No chest pain or palpitations  Gastrointestinal ROS: No abdominal pain or melena  Genito-Urinary ROS: No dysuria or hematuria  Psych ROS: No anxiety and  depression  14 point ROS reviewed with the patient and negative except as noted above and in the HPI unless unable to obtain.    Objective:  Patient Vitals for the past 24 hrs:   BP Temp Temp src Pulse Resp SpO2   03/29/24 0827 106/55 99.3 °F (37.4 °C) Oral 78 16 100 %   03/29/24 0500 127/70 98.6 °F (37 °C) Axillary 81 16 95 %   03/29/24 0100 -- 98.6 °F (37 °C) Axillary -- -- --   03/28/24 2110 -- 99.5 °F (37.5 °C) Axillary -- -- --   03/28/24 1959 118/59 98.3 °F (36.8 °C) Oral 100 16 98 %   03/28/24 1645 119/54 98.2 °F (36.8 °C) Oral 95 18 99 %   03/28/24 1126 134/66 98.8 °F (37.1 °C) Axillary 86 18 100 %       Intake/Output Summary (Last 24 hours) at 3/29/2024 1016  Last data filed at 3/29/2024 0900  Gross per 24 hour   Intake 600 ml   Output 925 ml   Net -325 ml     General: awake/alert   Chest:  clear to auscultation bilaterally without respiratory distress  CVS: regular rate and rhythm  Abdominal: soft, nontender, positive bowel sounds  Extremities:  bilateral 1+ edema  Skin: warm and dry without rash      Labs:  Results from last 7 days   Lab Units 03/29/24  0551 03/28/24  1004 03/27/24  0449   WBC 10*3/mm3 6.08 9.45 12.75*   HEMOGLOBIN g/dL 11.1* 13.7 11.8*   HEMATOCRIT % 33.6* 41.1 35.9*   PLATELETS 10*3/mm3 156 154 163         Results from last 7 days   Lab Units 03/29/24  0551 03/28/24  1004 03/27/24  0449 03/26/24  1326   SODIUM mmol/L 138 140 142 140   POTASSIUM mmol/L 3.3* 3.6 3.5 3.4*   CHLORIDE mmol/L 103 102 103 103   CO2 mmol/L 23.0 22.0 25.0 23.0   BUN mg/dL 40* 36* 29* 29*   CREATININE mg/dL 2.07* 2.03* 2.07* 1.93*   CALCIUM mg/dL 8.4* 9.2 8.9 9.5   EGFR mL/min/1.73 34.5* 35.3* 34.5* 37.5*   BILIRUBIN mg/dL 0.7  --   --  0.7   ALK PHOS U/L 81  --   --  91   ALT (SGPT) U/L 17  --   --  8   AST (SGOT) U/L 32  --   --  13   GLUCOSE mg/dL 120* 79 169* 438*       Radiology:   Imaging Results (Last 72 Hours)       Procedure Component Value Units Date/Time    XR Foot 3+ View Left [[id number]] Collected:  03/26/24 1723     Updated: 03/26/24 1728    Narrative:      EXAMINATION: XR FOOT 3+ VW LEFT-     3/26/2024 4:19 PM     HISTORY: foot infection     3 view LEFT foot exam.     Previous amputation of the mid/distal fifth metatarsal.     Interval resection or resorption of the fourth metatarsal head.     No bone destruction or soft tissue air is seen.     High-grade degenerative disease at the first metatarsal phalangeal  joint.     Unchanged appearance of prominent dorsal midfoot spurring and prominent  inferior calcaneal spurring.     Mild soft tissue edema over the dorsum of the foot is less prominent as  compared with the previous exam.       Impression:      1. No bone destruction or soft tissue air is seen.  2. Prominent degenerative spurring.           This report was signed and finalized on 3/26/2024 5:25 PM by Dr. Gomez Mcgill MD.       XR Chest 1 View [[id number]] Collected: 03/26/24 1433     Updated: 03/26/24 1437    Narrative:      EXAM: XR CHEST 1 VW-      DATE: 3/26/2024 1:15 PM     HISTORY: ams       COMPARISON: 8/28/2023.     TECHNIQUE:  Frontal view(s) of the chest submitted.     FINDINGS:    Patient is status post median sternotomy and CABG. There is enlargement  of the cardiac silhouette. There are low lung volumes with vascular  crowding versus mild volume overload. No effusion or pneumothorax is  seen.          Impression:         1. Postoperative chest and low lung volumes with vascular crowding  versus mild volume overload.     This report was signed and finalized on 3/26/2024 2:34 PM by Eran Christina.       CT Head Without Contrast [[id number]] Collected: 03/26/24 1408     Updated: 03/26/24 1413    Narrative:      EXAM: CT HEAD WO CONTRAST-      DATE: 3/26/2024 1:03 PM     HISTORY: ams       COMPARISON: 10/10/2023.     DOSE LENGTH PRODUCT: 876.8 mGy.cm  Automated exposure control was also  utilized to decrease patient radiation dose.     TECHNIQUE: Unenhanced CT images obtained from [facility] to  skull base with  multiplanar reformats.     FINDINGS:  There is no acute intracranial hemorrhage, midline shift, mass effect,  or hydrocephalus. There is no CT evidence for acute infarct.     There are chronic changes with volume loss and chronic small vessel  ischemic change of the periventricular white matter.      SOFT TISSUES: The scalp soft tissues are unremarkable.        SINUS: There is partially imaged mucosal thickening at the right  maxillary sinus.     ORBITS: The visualized orbits and globes are unremarkable. There is  bilateral lens extraction.          Impression:      1. Chronic changes and no acute intracranial findings.      This report was signed and finalized on 3/26/2024 2:10 PM by Eran Christina.               Culture:  Blood Culture   Date Value Ref Range Status   03/26/2024 Staphylococcus aureus, MRSA (C)  Final     Comment:       Infectious disease consultation is highly recommended to rule out distant foci of infection.  Methicillin resistant Staphylococcus aureus, Patient may be an isolation risk.   03/26/2024 Staphylococcus aureus, MRSA (C)  Final     Comment:       Infectious disease consultation is highly recommended to rule out distant foci of infection.  Methicillin resistant Staphylococcus aureus, Patient may be an isolation risk.         Assessment    Acute kidney injury, ATN  Baseline chronic kidney disease stage 3b  Type 2 diabetes, poorly controlled (A1c 10.8%)  Hypertension   Metabolic encephalopathy--improving  Anemia of CKD  Hypokalemia--improved  Sepsis due to infection of left foot wound    Plan:   Continue PO Bumex  Monitor labs      Stewart Alvarez, APRN  3/29/2024  10:16 CDT

## 2024-03-29 NOTE — THERAPY EVALUATION
Patient Name: Erick Luong  : 1955    MRN: [id number]                              Today's Date: 3/29/2024       Admit Date: 3/26/2024    Visit Dx:     ICD-10-CM ICD-9-CM   1. Diabetic foot infection  E11.628 250.80    L08.9 686.9   2. Poorly controlled diabetes mellitus  E11.65 250.00   3. Impaired functional mobility and activity tolerance [Z74.09]  Z74.09 V49.89     Patient Active Problem List   Diagnosis    Obesity, unspecified obesity severity, unspecified obesity type    Essential hypertension    Type 2 diabetes mellitus with hyperglycemia, with long-term current use of insulin    Nonsmoker    Anemia due to chronic kidney disease    Class 3 severe obesity due to excess calories with body mass index (BMI) of 40.0 to 44.9 in adult    Anasarca    Sleep apnea with use of continuous positive airway pressure (CPAP)    Medically noncompliant    Diabetic ulcer of left midfoot associated with type 2 diabetes mellitus, with fat layer exposed    Diabetic polyneuropathy associated with type 2 diabetes mellitus    Spinal stenosis in cervical region    Degeneration of cervical intervertebral disc    Cervical radiculopathy    Degeneration of lumbar or lumbosacral intervertebral disc    Cervical myelopathy    Bilateral carpal tunnel syndrome    CAD (coronary artery disease)    GERD without esophagitis    BPH without obstruction/lower urinary tract symptoms    Stage 3b chronic kidney disease    Chronic diastolic heart failure    Type 2 myocardial infarction due to heart failure    Left carpal tunnel syndrome    Syncope and collapse, recurrent episodes    Poorly-controlled hypertension    Rhinovirus    Peripheral vascular disease    Chronic kidney disease (CKD), stage IV (severe)    Diabetic foot infection    Sepsis     Past Medical History:   Diagnosis Date    Arthritis     Autonomic disease     CAD (coronary artery disease) 2017    Cervical radiculopathy 2021    Chronic constipation with acute  exaccerbation 05/10/2021    Coronary artery disease     Degeneration of cervical intervertebral disc 08/11/2021    Diabetes mellitus     Diabetic foot ulcer 08/31/2020    Diabetic polyneuropathy associated with type 2 diabetes mellitus 01/18/2021    Elevated cholesterol     Gastroesophageal reflux disease 05/13/2019    Headache     HTN (hypertension), benign 05/03/2017    Hyperlipidemia     Hypertension     Mixed hyperlipidemia 02/07/2017    Multiple lung nodules 01/26/2020    5mm, 9 mm RLL identified 1/2020, not present 10/2019.    Myocardial infarction     Osteomyelitis 01/22/2020    Osteomyelitis of fifth toe of right foot 10/07/2019    Pancreatitis     Persistent insomnia 01/20/2020    Renal disorder     Sleep apnea 02/06/2017    Sleep apnea with use of continuous positive airway pressure (CPAP)     NON-COMPLIANT    Slow transit constipation 01/16/2019    Spinal stenosis in cervical region 09/16/2021    Vitamin D deficiency 03/02/2021     Past Surgical History:   Procedure Laterality Date    ABDOMINAL SURGERY      AMPUTATION FOOT / TOE Left 10/2021    5th digit     ANTERIOR CERVICAL DISCECTOMY W/ FUSION N/A 8/5/2022    Procedure: CERVICAL DISCECTOMY ANTERIOR WITH FUSION C5-6 with possible plating of C5-7 with neuromonitoring and 1 c-arm;  Surgeon: Karel Soliz MD;  Location:  [facility] OR;  Service: Neurosurgery;  Laterality: N/A;    APPENDECTOMY      BACK SURGERY      CARDIAC CATHETERIZATION Left 02/08/2021    Procedure: Left Heart Cath w poss intervention left anatomical snuff box acess;  Surgeon: Omkar Charles DO;  Location:  [facility]ATH [facility]AS[facility]ATION;  Service: Cardiology;  Laterality: Left;    CARDIAC SURGERY      CATARACT EXTRACTION      CERVICAL SPINE SURGERY      COLONOSCOPY N/A 01/31/2017    Normal exam repeat in 5 years    COLONOSCOPY N/A 02/11/2019    Mild acute inflammation    COLONOSCOPY W/ POLYPECTOMY  03/04/2014    Hyperplastic polyp    CORONARY ARTERY BYPASS GRAFT  10/2015     ENDOSCOPY  04/13/2011    Gastritis with hemorrhage    ENDOSCOPY N/A 05/05/2017    Normal exam    ENDOSCOPY N/A 02/11/2019    Gastritis    ENDOSCOPY N/A 09/01/2020    Non-erosive gastritis with hemorrhage    ENDOSCOPY N/A 02/10/2021    Esophagitis    FOOT SURGERY Left     INCISION AND DRAINAGE OF WOUND Left 09/2007    spider bite      General Information       Row Name 03/29/24 0935          Physical Therapy Time and Intention    Document Type evaluation;other (see comments)  see MAR  -JE     Mode of Treatment physical therapy  -       Row Name 03/29/24 0935          General Information    Patient Profile Reviewed yes  -JE     Prior Level of Function independent:;all household mobility;min assist:;dressing;bathing  denies use of equipment for mobility  -JE     Existing Precautions/Restrictions fall  -JE     Barriers to Rehab medically complex;physical barrier;cognitive status  reports intact to lt touch, but LEs are tingly  -JE       Row Name 03/29/24 0935          Living Environment    People in Home spouse  -JE     Name(s) of People in Home lives w/ spouse - Joan  -       Row Name 03/29/24 0935          Home Main Entrance    Number of Stairs, Main Entrance one  -JE     Stair Railings, Main Entrance none  -JE       Row Name 03/29/24 0935          Stairs Within Home, Primary    Number of Stairs, Within Home, Primary none  -JE       Row Name 03/29/24 0935          Cognition    Orientation Status (Cognition) oriented to;person;place;situation;other (see comments)  knew year, did not know month  -       Row Name 03/29/24 0935          Safety Issues, Functional Mobility    Safety Issues Affecting Function (Mobility) friction/shear risk;insight into deficits/self-awareness;safety precaution awareness  -JE     Impairments Affecting Function (Mobility) cognition;endurance/activity tolerance;pain;range of motion (ROM);sensation/sensory awareness;strength  -JE     Cognitive Impairments, Mobility Safety/Performance  insight into deficits/self-awareness;safety precaution awareness  -               User Key  (r) = Recorded By, (t) = Taken By, (c) = Cosigned By      Initials Name Provider Type    Melly Brown, PT Physical Therapist                   Mobility       Row Name 03/29/24 0935          Bed Mobility    Bed Mobility rolling left;rolling right;scooting/bridging;supine-sit;sit-supine  -     Rolling Left [place] (Bed Mobility) minimum assist (75% patient effort);verbal cues  -     Rolling Right [place] (Bed Mobility) minimum assist (75% patient effort);verbal cues  -     Scooting/Bridging [place] (Bed Mobility) maximum assist (25% patient effort);verbal cues  -     Supine-Sit [place] (Bed Mobility) moderate assist (50% patient effort);verbal cues  -     Sit-Supine [place] (Bed Mobility) minimum assist (75% patient effort);contact guard;verbal cues  -     Assistive Device (Bed Mobility) bed rails;draw sheet;other (see comments)  bed placed in trendelenberg to pull pt up in bed  -       Row Name 03/29/24 0935          Transfers    Comment, (Transfers) RN advised to hold out of bed activity this date; awaiting clarification re: L foot   -       Row Name 03/29/24 0935          Mobility    Extremity Weight-bearing Status left lower extremity  -     Left Lower Extremity (Weight-bearing Status) non weight-bearing (NWB);other (see comments)   unless otherwise ordered  -               User Key  (r) = Recorded By, (t) = Taken By, (c) = Cosigned By      Initials Name Provider Type    Melly Brown, PT Physical Therapist                   Obj/Interventions       Row Name 03/29/24 0935          Range of Motion Comprehensive    Comment, General Range of Motion B shld flexion limited to less than 50%  -       Row Name 03/29/24 0935          Strength Comprehensive (MMT)    Comment, General Manual Muscle Testing (MMT) Assessment R shld grossly 3+ to 4-/5, R ankle DF 3+/5, L ankle  no formal assessment, all other ms groups grossly 4/5  -       Row Name 03/29/24 0935          Motor Skills    Motor Skills other (see comments)  performs finger opposition B w/ increase difficulty opposing the 5th finger B  -       Row Name 03/29/24 0935          Balance    Balance Assessment sitting static balance  -JE     Static Sitting Balance standby assist  -JE     Position, Sitting Balance supported;unsupported;sitting edge of bed  -       Row Name 03/29/24 0935          Sensory Assessment (Somatosensory)    Sensory Assessment (Somatosensory) other (see comments)  reports intact to lt touch, but c/os tingling in LEs  -JE               User Key  (r) = Recorded By, (t) = Taken By, (c) = Cosigned By      Initials Name Provider Type    Melly Brown, PT Physical Therapist                   Goals/Plan       Row Name 03/29/24 0935          Bed Mobility Goal 1 (PT)    Activity/Assistive Device (Bed Mobility Goal 1, PT) rolling to left;rolling to right;scooting;sit to supine/supine to sit;sidelying to sit/sit to sidelying  -JE     [place] Level/Cues Needed (Bed Mobility Goal 1, PT) contact guard required;standby assist  -JE     Time Frame (Bed Mobility Goal 1, PT) long term goal (LTG);10 days  -JE     Strategies/Barriers (Bed Mobility Goal 1, PT) w/ bedrails  -JE     Progress/Outcomes (Bed Mobility Goal 1, PT) goal ongoing  -       Row Name 03/29/24 0935          Transfer Goal 1 (PT)    Activity/Assistive Device (Transfer Goal 1, PT) bed-to-chair/chair-to-bed;sliding board;other (see comments)  maintain NWB L LE  -JE     [place] Level/Cues Needed (Transfer Goal 1, PT) minimum assist (75% or more patient effort)  -JE     Time Frame (Transfer Goal 1, PT) long term goal (LTG);10 days  -JE     Strategies/Barriers (Transfers Goal 1, PT) begin when safely appropriate for Out of bed activity  -JE     Progress/Outcome (Transfer Goal 1, PT) goal ongoing  -       Row Name 03/29/24 0935           Patient Education Goal (PT)    Activity (Patient Education Goal, PT) w/c mobility and mechanics on level surface functional distances w/ L LE maintained NWB  -JE     [place]/Cues/Accuracy (Memory Goal 2, PT) demonstrates adequately;independent;verbalizes understanding  -JE     Time Frame (Patient Education Goal, PT) long term goal (LTG);10 days  -JE     Progress/Outcome (Patient Education Goal, PT) goal ongoing  -       Row Name 03/29/24 0909          Therapy Assessment/Plan (PT)    Planned Therapy Interventions (PT) balance training;bed mobility training;home exercise program;postural re-education;patient/family education;strengthening;transfer training;wheelchair management/propulsion training;other (see comments)  NWB L LE unless otherwise ordered; out of bed activity only when advised safe to begin  -               User Key  (r) = Recorded By, (t) = Taken By, (c) = Cosigned By      Initials Name Provider Type    Melly Brown, PT Physical Therapist                   Clinical Impression       Row Name 03/29/24 0927          Pain    Pretreatment Pain Rating 7/10  -     Posttreatment Pain Rating 8/10   in stomach at end of visit  -JE     Pain Location lower  -JE     Pain Location - back  -JE     Pain Intervention(s) Repositioned;Rest  -       Row Name 03/29/24 0903          Plan of Care Review    Plan of Care Reviewed With patient  -JE     Progress no change  -JE     Outcome Evaluation PT eval completed.  Pt oriented X 4 except did not know the current month.  Pt very sleepy during visit and keeps his eyes closed most of the time, however rouses to answer questions and perform tasks.  Pt w/ dressing in place L foot.  Pt demonstrates generalized weakness.  Performs rolling w/ min assist, scooting up in bed w/ max assist and modifications, supine to sit w/ mod assist and sit to supine w/ min to CGA.  Pt w/ increase c/os feeling dizzy upon sitting up w/out improvements reported.  Pt also reported  back pain at rest, which changed to abdominal pain w/ activity and remained at completion of visit.  Out of bed activity held this date.  Pt w/ low tolerance to upright w/ increase c/os, but also awaiting MRI and clarication re: plans for L foot.  PT to follow for continued strengthening, bed mobility training, to improve activity tolerance, to assist w/ skin protection/pressure relief and to progress out of bed activity as safely able and when activity advised ok.  Pt remain NWB L LE until otherwise ordered.  Will follow for progress and needs.  Anticipate pt may need stay in subacute rehab or SNF prior to returnng home pending progress.  -       Row Name 03/29/24 0935          Therapy Assessment/Plan (PT)    Patient/Family Therapy Goals Statement (PT) none stated  -     Rehab Potential (PT) good, to achieve stated therapy goals  -     Criteria for Skilled Interventions Met (PT) yes;meets criteria;skilled treatment is necessary  -     Therapy Frequency (PT) 2 times/day  -     Predicted Duration of Therapy Intervention (PT) until discharge or goals achieved  -       Row Name 03/29/24 0935          Vital Signs    Pretreatment Heart Rate (beats/min) 84  -JE     Intratreatment Heart Rate (beats/min) 92  -JE     Posttreatment Heart Rate (beats/min) 84  -JE     Pre SpO2 (%) 96  -JE     O2 Delivery Pre Treatment room air  -JE     O2 Delivery Intra Treatment room air  -JE     Post SpO2 (%) 95  -JE     O2 Delivery Post Treatment room air  -JE     Pre Patient Position Supine  fowlers  -     Intra Patient Position Sitting  -     Post Patient Position Supine  fowlers  -       Row Name 03/29/24 0935          Positioning and Restraints    Pre-Treatment Position in bed  -JE     Post Treatment Position bed  -JE     In Bed notified nsg;fowlers;supine;call light within reach;encouraged to call for assist;exit alarm on;side rails up x3;legs elevated;LLE elevated  L heel floating  -               User Key  (r) =  Recorded By, (t) = Taken By, (c) = Cosigned By      Initials Name Provider Type    Melly Brown, PT Physical Therapist                   Outcome Measures       Row Name 03/29/24 0935          How much help from another person do you currently need...    Turning from your back to your side while in flat bed without using bedrails? 2  -JE     Moving from lying on back to sitting on the side of a flat bed without bedrails? 2  -JE     Moving to and from a bed to a chair (including a wheelchair)? 2  -JE     Standing up from a chair using your arms (e.g., wheelchair, bedside chair)? 2  -JE     Climbing 3-5 steps with a railing? 1  -JE     To walk in hospital room? 1  -     AM-PAC 6 Clicks Score (PT) 10  -     Highest Level of Mobility Goal 4 --> Transfer to chair/commode  -NIKO       Row Name 03/29/24 0935          Functional Assessment    Outcome Measure Options AM-PAC 6 Clicks Basic Mobility (PT)  -               User Key  (r) = Recorded By, (t) = Taken By, (c) = Cosigned By      Initials Name Provider Type    Melly Brown, PT Physical Therapist                                 Physical Therapy Education       Title: PT OT SLP Therapies (In Progress)       Topic: Physical Therapy (In Progress)       Point: Mobility training (Done)       Learning Progress Summary             Patient Acceptance, E,TB,D, VU,NR by  at 3/29/2024 1009    Comment: education re: purpose of PT/importance of activity, safety/falls prevention, NWB L LE, improved tech w/ bed mobility, positioning for pressure relief                         Point: Home exercise program (Not Started)       Learner Progress:  Not documented in this visit.              Point: Precautions (Done)       Learning Progress Summary             Patient Acceptance, E,TB,D, VU,NR by NIKO at 3/29/2024 1009    Comment: education re: purpose of PT/importance of activity, safety/falls prevention, NWB L LE, improved tech w/ bed mobility, positioning for pressure  relief                                         User Key       Initials Effective Dates Name Provider Type Discipline     08/02/18 -  Melly Mustafa, PT Physical Therapist PT                  PT Recommendation and Plan  Planned Therapy Interventions (PT): balance training, bed mobility training, home exercise program, postural re-education, patient/family education, strengthening, transfer training, wheelchair management/propulsion training, other (see comments) (NWB L LE unless otherwise ordered; out of bed activity only when advised safe to begin)  Plan of Care Reviewed With: patient  Progress: no change  Outcome Evaluation: PT eval completed.  Pt oriented X 4 except did not know the current month.  Pt very sleepy during visit and keeps his eyes closed most of the time, however rouses to answer questions and perform tasks.  Pt w/ dressing in place L foot.  Pt demonstrates generalized weakness.  Performs rolling w/ min assist, scooting up in bed w/ max assist and modifications, supine to sit w/ mod assist and sit to supine w/ min to CGA.  Pt w/ increase c/os feeling dizzy upon sitting up w/out improvements reported.  Pt also reported back pain at rest, which changed to abdominal pain w/ activity and remained at completion of visit.  Out of bed activity held this date.  Pt w/ low tolerance to upright w/ increase c/os, but also awaiting MRI and clarication re: plans for L foot.  PT to follow for continued strengthening, bed mobility training, to improve activity tolerance, to assist w/ skin protection/pressure relief and to progress out of bed activity as safely able and when activity advised ok.  Pt remain NWB L LE until otherwise ordered.  Will follow for progress and needs.  Anticipate pt may need stay in subacute rehab or SNF prior to returnng home pending progress.     Time Calculation:         PT Charges       Row Name 03/29/24 1021             Time Calculation    Start Time 0992  -      Stop Time 3316   -NIKO      Time Calculation (min) 54 min  -NIKO      PT Received On 03/29/24  -NIKO      PT Goal Re-Cert Due Date 04/08/24  -NIKO                User Key  (r) = Recorded By, (t) = Taken By, (c) = Cosigned By      Initials Name Provider Type    Melly Brown, PT Physical Therapist                  Therapy Charges for Today       Code Description Service Date Service Provider Modifiers Qty    78150139191 HC PT EVAL MOD COMPLEXITY 4 3/29/2024 Melly Mustafa, PT GP 1            PT G-Codes  Outcome Measure Options: AM-PAC 6 Clicks Basic Mobility (PT)  AM-PAC 6 Clicks Score (PT): 10  PT Discharge Summary  Anticipated Discharge Disposition (PT): sub acute care setting, skilled nursing facility (pending progress)    Melly Mustafa, PT  3/29/2024

## 2024-03-29 NOTE — CASE MANAGEMENT/SOCIAL WORK
Continued Stay Note  [facility]     Patient Name: Erick Luong  MRN: [id number]  Today's Date: 3/29/2024    Admit Date: 3/26/2024    Plan: Home   Discharge Plan       Row Name 03/29/24 1346       Plan    Plan Home    Patient/Family in Agreement with Plan yes    Plan Comments Pt's spouse plans for pt to return home at d/c. She has denied needs.                   Discharge Codes    No documentation.                 Expected Discharge Date and Time       Expected Discharge Date Expected Discharge Time    Mar 29, 2024               MEJIA Carreno

## 2024-03-30 LAB
ALBUMIN SERPL-MCNC: 2.8 G/DL (ref 3.5–5.2)
ALBUMIN/GLOB SERPL: 1 G/DL
ALP SERPL-CCNC: 95 U/L (ref 39–117)
ALT SERPL W P-5'-P-CCNC: 19 U/L (ref 1–41)
ANION GAP SERPL CALCULATED.3IONS-SCNC: 11 MMOL/L (ref 5–15)
AST SERPL-CCNC: 32 U/L (ref 1–40)
BACTERIA BLD CULT: ABNORMAL
BASOPHILS # BLD AUTO: 0.02 10*3/MM3 (ref 0–0.2)
BASOPHILS NFR BLD AUTO: 0.4 % (ref 0–1.5)
BILIRUB SERPL-MCNC: 0.5 MG/DL (ref 0–1.2)
BOTTLE TYPE: ABNORMAL
BUN SERPL-MCNC: 51 MG/DL (ref 8–23)
BUN/CREAT SERPL: 18.6 (ref 7–25)
CALCIUM SPEC-SCNC: 8.2 MG/DL (ref 8.6–10.5)
CHLORIDE SERPL-SCNC: 100 MMOL/L (ref 98–107)
CO2 SERPL-SCNC: 24 MMOL/L (ref 22–29)
CREAT SERPL-MCNC: 2.74 MG/DL (ref 0.76–1.27)
DEPRECATED RDW RBC AUTO: 46.5 FL (ref 37–54)
EGFRCR SERPLBLD CKD-EPI 2021: 24.6 ML/MIN/1.73
EOSINOPHIL # BLD AUTO: 0.23 10*3/MM3 (ref 0–0.4)
EOSINOPHIL NFR BLD AUTO: 4 % (ref 0.3–6.2)
ERYTHROCYTE [DISTWIDTH] IN BLOOD BY AUTOMATED COUNT: 14.6 % (ref 12.3–15.4)
GLOBULIN UR ELPH-MCNC: 2.7 GM/DL
GLUCOSE BLDC GLUCOMTR-MCNC: 145 MG/DL (ref 70–130)
GLUCOSE BLDC GLUCOMTR-MCNC: 153 MG/DL (ref 70–130)
GLUCOSE BLDC GLUCOMTR-MCNC: 225 MG/DL (ref 70–130)
GLUCOSE BLDC GLUCOMTR-MCNC: 90 MG/DL (ref 70–130)
GLUCOSE SERPL-MCNC: 104 MG/DL (ref 65–99)
HCT VFR BLD AUTO: 30.6 % (ref 37.5–51)
HGB BLD-MCNC: 10 G/DL (ref 13–17.7)
IMM GRANULOCYTES # BLD AUTO: 0.02 10*3/MM3 (ref 0–0.05)
IMM GRANULOCYTES NFR BLD AUTO: 0.4 % (ref 0–0.5)
LYMPHOCYTES # BLD AUTO: 1.31 10*3/MM3 (ref 0.7–3.1)
LYMPHOCYTES NFR BLD AUTO: 23 % (ref 19.6–45.3)
MCH RBC QN AUTO: 27.9 PG (ref 26.6–33)
MCHC RBC AUTO-ENTMCNC: 32.7 G/DL (ref 31.5–35.7)
MCV RBC AUTO: 85.5 FL (ref 79–97)
MONOCYTES # BLD AUTO: 0.67 10*3/MM3 (ref 0.1–0.9)
MONOCYTES NFR BLD AUTO: 11.8 % (ref 5–12)
NEUTROPHILS NFR BLD AUTO: 3.44 10*3/MM3 (ref 1.7–7)
NEUTROPHILS NFR BLD AUTO: 60.4 % (ref 42.7–76)
NRBC BLD AUTO-RTO: 0 /100 WBC (ref 0–0.2)
PLATELET # BLD AUTO: 155 10*3/MM3 (ref 140–450)
PMV BLD AUTO: 11.7 FL (ref 6–12)
POTASSIUM SERPL-SCNC: 3.5 MMOL/L (ref 3.5–5.2)
PROT SERPL-MCNC: 5.5 G/DL (ref 6–8.5)
RBC # BLD AUTO: 3.58 10*6/MM3 (ref 4.14–5.8)
SODIUM SERPL-SCNC: 135 MMOL/L (ref 136–145)
WBC NRBC COR # BLD AUTO: 5.69 10*3/MM3 (ref 3.4–10.8)

## 2024-03-30 PROCEDURE — 97110 THERAPEUTIC EXERCISES: CPT

## 2024-03-30 PROCEDURE — 85025 COMPLETE CBC W/AUTO DIFF WBC: CPT | Performed by: INTERNAL MEDICINE

## 2024-03-30 PROCEDURE — 25810000003 SODIUM CHLORIDE 0.9 % SOLUTION: Performed by: INTERNAL MEDICINE

## 2024-03-30 PROCEDURE — 97530 THERAPEUTIC ACTIVITIES: CPT

## 2024-03-30 PROCEDURE — 99232 SBSQ HOSP IP/OBS MODERATE 35: CPT | Performed by: INTERNAL MEDICINE

## 2024-03-30 PROCEDURE — 63710000001 INSULIN DETEMIR PER 5 UNITS: Performed by: NURSE PRACTITIONER

## 2024-03-30 PROCEDURE — 63710000001 ONDANSETRON ODT 4 MG TABLET DISPERSIBLE: Performed by: NURSE PRACTITIONER

## 2024-03-30 PROCEDURE — 63710000001 INSULIN REGULAR HUMAN PER 5 UNITS: Performed by: EMERGENCY MEDICINE

## 2024-03-30 PROCEDURE — 82948 REAGENT STRIP/BLOOD GLUCOSE: CPT

## 2024-03-30 PROCEDURE — 87040 BLOOD CULTURE FOR BACTERIA: CPT | Performed by: INTERNAL MEDICINE

## 2024-03-30 PROCEDURE — 99232 SBSQ HOSP IP/OBS MODERATE 35: CPT | Performed by: UROLOGY

## 2024-03-30 PROCEDURE — 80053 COMPREHEN METABOLIC PANEL: CPT | Performed by: INTERNAL MEDICINE

## 2024-03-30 PROCEDURE — 25010000002 LINEZOLID 600 MG/300ML SOLUTION: Performed by: NURSE PRACTITIONER

## 2024-03-30 RX ORDER — FAMOTIDINE 20 MG/1
20 TABLET, FILM COATED ORAL DAILY
Status: DISCONTINUED | OUTPATIENT
Start: 2024-03-31 | End: 2024-04-05

## 2024-03-30 RX ADMIN — MIDODRINE HYDROCHLORIDE 2.5 MG: 2.5 TABLET ORAL at 08:36

## 2024-03-30 RX ADMIN — MIDODRINE HYDROCHLORIDE 2.5 MG: 2.5 TABLET ORAL at 17:45

## 2024-03-30 RX ADMIN — ISOSORBIDE MONONITRATE 30 MG: 30 TABLET, EXTENDED RELEASE ORAL at 08:36

## 2024-03-30 RX ADMIN — ASPIRIN 81 MG: 81 TABLET, COATED ORAL at 08:36

## 2024-03-30 RX ADMIN — INSULIN HUMAN 10 UNITS: 100 INJECTION, SOLUTION PARENTERAL at 08:36

## 2024-03-30 RX ADMIN — SODIUM CHLORIDE 500 ML: 9 INJECTION, SOLUTION INTRAVENOUS at 17:45

## 2024-03-30 RX ADMIN — Medication 5000 UNITS: at 08:36

## 2024-03-30 RX ADMIN — ZINC SULFATE 220 MG (50 MG) CAPSULE 220 MG: CAPSULE at 08:36

## 2024-03-30 RX ADMIN — OXYCODONE HYDROCHLORIDE 10 MG: 5 TABLET ORAL at 17:17

## 2024-03-30 RX ADMIN — TAMSULOSIN HYDROCHLORIDE 0.4 MG: 0.4 CAPSULE ORAL at 08:38

## 2024-03-30 RX ADMIN — Medication 1 APPLICATION: at 20:32

## 2024-03-30 RX ADMIN — Medication 1 APPLICATION: at 08:36

## 2024-03-30 RX ADMIN — TIMOLOL MALEATE 1 DROP: 2.5 SOLUTION/ DROPS OPHTHALMIC at 08:37

## 2024-03-30 RX ADMIN — PREGABALIN 50 MG: 50 CAPSULE ORAL at 08:41

## 2024-03-30 RX ADMIN — CALCITRIOL 0.5 MCG: 0.25 CAPSULE ORAL at 08:36

## 2024-03-30 RX ADMIN — SUCRALFATE 1 G: 1 SUSPENSION ORAL at 17:45

## 2024-03-30 RX ADMIN — PREGABALIN 100 MG: 100 CAPSULE ORAL at 20:32

## 2024-03-30 RX ADMIN — INSULIN HUMAN 10 UNITS: 100 INJECTION, SOLUTION PARENTERAL at 17:45

## 2024-03-30 RX ADMIN — FAMOTIDINE 20 MG: 20 TABLET, FILM COATED ORAL at 08:36

## 2024-03-30 RX ADMIN — OXYCODONE HYDROCHLORIDE AND ACETAMINOPHEN 1000 MG: 500 TABLET ORAL at 08:36

## 2024-03-30 RX ADMIN — Medication 6 MG: at 20:32

## 2024-03-30 RX ADMIN — APIXABAN 5 MG: 5 TABLET, FILM COATED ORAL at 08:36

## 2024-03-30 RX ADMIN — CARVEDILOL 3.12 MG: 3.12 TABLET, FILM COATED ORAL at 17:48

## 2024-03-30 RX ADMIN — SUCRALFATE 1 G: 1 SUSPENSION ORAL at 08:36

## 2024-03-30 RX ADMIN — ROSUVASTATIN CALCIUM 10 MG: 10 TABLET, FILM COATED ORAL at 20:32

## 2024-03-30 RX ADMIN — INSULIN DETEMIR 30 UNITS: 100 INJECTION, SOLUTION SUBCUTANEOUS at 20:34

## 2024-03-30 RX ADMIN — APIXABAN 5 MG: 5 TABLET, FILM COATED ORAL at 20:32

## 2024-03-30 RX ADMIN — ONDANSETRON 4 MG: 4 TABLET, ORALLY DISINTEGRATING ORAL at 17:48

## 2024-03-30 RX ADMIN — BUMETANIDE 1 MG: 1 TABLET ORAL at 08:36

## 2024-03-30 RX ADMIN — SUCRALFATE 1 G: 1 SUSPENSION ORAL at 11:32

## 2024-03-30 RX ADMIN — ONDANSETRON 4 MG: 4 TABLET, ORALLY DISINTEGRATING ORAL at 11:32

## 2024-03-30 RX ADMIN — LINEZOLID 600 MG: 600 INJECTION, SOLUTION INTRAVENOUS at 11:32

## 2024-03-30 RX ADMIN — MIDODRINE HYDROCHLORIDE 2.5 MG: 2.5 TABLET ORAL at 11:32

## 2024-03-30 RX ADMIN — LINEZOLID 600 MG: 600 INJECTION, SOLUTION INTRAVENOUS at 23:39

## 2024-03-30 RX ADMIN — INSULIN HUMAN 10 UNITS: 100 INJECTION, SOLUTION PARENTERAL at 11:32

## 2024-03-30 RX ADMIN — OXYCODONE HYDROCHLORIDE 10 MG: 5 TABLET ORAL at 00:13

## 2024-03-30 RX ADMIN — LINEZOLID 600 MG: 600 INJECTION, SOLUTION INTRAVENOUS at 00:14

## 2024-03-30 RX ADMIN — DONEPEZIL HYDROCHLORIDE 10 MG: 10 TABLET, FILM COATED ORAL at 08:36

## 2024-03-30 RX ADMIN — Medication 10 ML: at 08:37

## 2024-03-30 RX ADMIN — TIMOLOL MALEATE 1 DROP: 2.5 SOLUTION/ DROPS OPHTHALMIC at 20:32

## 2024-03-30 NOTE — THERAPY TREATMENT NOTE
Acute Care - Physical Therapy Treatment Note  [facility]     Patient Name: Erick Luong  : 1955  MRN: [id number]  Today's Date: 3/30/2024      Visit Dx:     ICD-10-CM ICD-9-CM   1. Diabetic foot infection  E11.628 250.80    L08.9 686.9   2. Poorly controlled diabetes mellitus  E11.65 250.00   3. Impaired functional mobility and activity tolerance [Z74.09]  Z74.09 V49.89     Patient Active Problem List   Diagnosis    Obesity, unspecified obesity severity, unspecified obesity type    Essential hypertension    Type 2 diabetes mellitus with hyperglycemia, with long-term current use of insulin    Nonsmoker    Anemia due to chronic kidney disease    Class 3 severe obesity due to excess calories with body mass index (BMI) of 40.0 to 44.9 in adult    Anasarca    Sleep apnea with use of continuous positive airway pressure (CPAP)    Medically noncompliant    Diabetic ulcer of left midfoot associated with type 2 diabetes mellitus, with fat layer exposed    Diabetic polyneuropathy associated with type 2 diabetes mellitus    Spinal stenosis in cervical region    Degeneration of cervical intervertebral disc    Cervical radiculopathy    Degeneration of lumbar or lumbosacral intervertebral disc    Cervical myelopathy    Bilateral carpal tunnel syndrome    CAD (coronary artery disease)    GERD without esophagitis    BPH without obstruction/lower urinary tract symptoms    Stage 3b chronic kidney disease    Chronic diastolic heart failure    Type 2 myocardial infarction due to heart failure    Left carpal tunnel syndrome    Syncope and collapse, recurrent episodes    Poorly-controlled hypertension    Rhinovirus    Peripheral vascular disease    Chronic kidney disease (CKD), stage IV (severe)    Diabetic foot infection    Sepsis     Past Medical History:   Diagnosis Date    Arthritis     Autonomic disease     CAD (coronary artery disease) 2017    Cervical radiculopathy 2021    Chronic constipation with acute  exaccerbation 05/10/2021    Coronary artery disease     Degeneration of cervical intervertebral disc 08/11/2021    Diabetes mellitus     Diabetic foot ulcer 08/31/2020    Diabetic polyneuropathy associated with type 2 diabetes mellitus 01/18/2021    Elevated cholesterol     Gastroesophageal reflux disease 05/13/2019    Headache     HTN (hypertension), benign 05/03/2017    Hyperlipidemia     Hypertension     Mixed hyperlipidemia 02/07/2017    Multiple lung nodules 01/26/2020    5mm, 9 mm RLL identified 1/2020, not present 10/2019.    Myocardial infarction     Osteomyelitis 01/22/2020    Osteomyelitis of fifth toe of right foot 10/07/2019    Pancreatitis     Persistent insomnia 01/20/2020    Renal disorder     Sleep apnea 02/06/2017    Sleep apnea with use of continuous positive airway pressure (CPAP)     NON-COMPLIANT    Slow transit constipation 01/16/2019    Spinal stenosis in cervical region 09/16/2021    Vitamin D deficiency 03/02/2021     Past Surgical History:   Procedure Laterality Date    ABDOMINAL SURGERY      AMPUTATION FOOT / TOE Left 10/2021    5th digit     ANTERIOR CERVICAL DISCECTOMY W/ FUSION N/A 8/5/2022    Procedure: CERVICAL DISCECTOMY ANTERIOR WITH FUSION C5-6 with possible plating of C5-7 with neuromonitoring and 1 c-arm;  Surgeon: Karel Soliz MD;  Location:  [facility] OR;  Service: Neurosurgery;  Laterality: N/A;    APPENDECTOMY      BACK SURGERY      CARDIAC CATHETERIZATION Left 02/08/2021    Procedure: Left Heart Cath w poss intervention left anatomical snuff box acess;  Surgeon: Omkar Charles DO;  Location:  [facility] CATH INVASIVE LOCATION;  Service: Cardiology;  Laterality: Left;    CARDIAC SURGERY      CATARACT EXTRACTION      CERVICAL SPINE SURGERY      COLONOSCOPY N/A 01/31/2017    Normal exam repeat in 5 years    COLONOSCOPY N/A 02/11/2019    Mild acute inflammation    COLONOSCOPY W/ POLYPECTOMY  03/04/2014    Hyperplastic polyp    CORONARY ARTERY BYPASS GRAFT  10/2015     ENDOSCOPY  04/13/2011    Gastritis with hemorrhage    ENDOSCOPY N/A 05/05/2017    Normal exam    ENDOSCOPY N/A 02/11/2019    Gastritis    ENDOSCOPY N/A 09/01/2020    Non-erosive gastritis with hemorrhage    ENDOSCOPY N/A 02/10/2021    Esophagitis    FOOT SURGERY Left     INCISION AND DRAINAGE OF WOUND Left 09/2007    spider bite     PT Assessment (Last 12 Hours)       PT Evaluation and Treatment       Row Name 03/30/24 1250          Physical Therapy Time and Intention    Subjective Information no complaints  -KJ     Document Type therapy note (daily note)  -KJ     Mode of Treatment physical therapy  -KJ     Patient Effort good  -KJ       Row Name 03/30/24 1250          General Information    Existing Precautions/Restrictions fall  cam boot  on LLE with transfers or walking; WBAT  -KJ       Row Name 03/30/24 1250          Pain    Pretreatment Pain Rating 3/10  -KJ     Posttreatment Pain Rating 5/10  -KJ     Pain Location - abdomen  -KJ       Row Name 03/30/24 1250          Mobility    Extremity Weight-bearing Status left lower extremity  -KJ     Left Lower Extremity (Weight-bearing Status) weight-bearing as tolerated (WBAT)  with cam boot on per Dr. Saqib ELIZABETH       Row Name 03/30/24 1250          Gait/Stairs (Locomotion)    Weld Level (Gait) verbal cues;contact guard  -KJ     Assistive Device (Gait) walker, front-wheeled  -KJ     Distance in Feet (Gait) 5  steps to chair  -KJ     Comment, (Gait/Stairs) cam boot donned at bedside to LLE, able to bear weight as tolerated per Dr. Saqib ELIZABETH       Row Name 03/30/24 1250          Motor Skills    Therapeutic Exercise aerobic  -KJ       Row Name 03/30/24 1250          Aerobic Exercise    Comment, Aerobic Exercise (Therapeutic Exercise) AROM  BLE x 15 reps  -KJ       Row Name             Wound 06/15/23 0102 Left anterior plantar    Wound - Properties Group Placement Date: 06/15/23  -SM Placement Time: 0102 -SM Side: Left  -SM Orientation: anterior  -SM Location:  plantar  -SM    Retired Wound - Properties Group Placement Date: 06/15/23  -SM Placement Time: 0102  -SM Side: Left  -SM Orientation: anterior  -SM Location: plantar  -SM    Retired Wound - Properties Group Date first assessed: 06/15/23  -SM Time first assessed: 0102  -SM Side: Left  -SM Location: plantar  -SM      Row Name             Wound 08/22/23 0140 Left posterior plantar    Wound - Properties Group Placement Date: 08/22/23  -AM Placement Time: 0140  -AM Present on Original Admission: Y  -AM Side: Left  -AM Orientation: posterior  -AM Location: plantar  -AM    Retired Wound - Properties Group Placement Date: 08/22/23  -AM Placement Time: 0140  -AM Present on Original Admission: Y  -AM Side: Left  -AM Orientation: posterior  -AM Location: plantar  -AM    Retired Wound - Properties Group Date first assessed: 08/22/23  -AM Time first assessed: 0140  -AM Present on Original Admission: Y  -AM Side: Left  -AM Location: plantar  -AM      Row Name             Wound Left lateral foot Diabetic Ulcer    Wound - Properties Group Side: Left  -MH Orientation: lateral  -MH Location: foot  -JACIEL, 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;diabetic foot ulcer/gangrene  Primary Wound Type: Diabetic ulc  -JACIEL Wound Outcome: Amputation  -JACIEL Stage, Pressure Injury : other (see comments)  -JACIEL, full thickness starting 10/20/21     Retired Wound - Properties Group Side: Left  -MH Orientation: lateral  -MH Location: foot  -JACIEL, 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;diabetic foot ulcer/gangrene  Primary Wound Type: Diabetic ulc  -JACIEL Stage, Pressure Injury : other (see comments)  -JACIEL, full thickness starting 10/20/21  Wound Outcome: Amputation  -JACIEL    Retired Wound - Properties Group Side: Left  -MH Location: foot  -JACIEL, 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;diabetic foot ulcer/gangrene  Primary Wound Type: Diabetic ulc  -JACIEL Wound Outcome: Amputation  -JACIEL      Row Name 03/30/24 1250          Positioning and Restraints    Pre-Treatment Position in bed  -KJ     Post Treatment Position  chair  -KJ     In Bed call light within reach;exit alarm on  -KJ               User Key  (r) = Recorded By, (t) = Taken By, (c) = Cosigned By      Initials Name Provider Type    Claudia Maddox, PARUL Physical Therapist Assistant    Yuliana Lisa RN Registered Nurse    MH Haase, Mallory L, RN Registered Nurse    Faviola Kunz RN Registered Nurse    Michelle Diaz RN Registered Nurse                    Physical Therapy Education       Title: PT OT SLP Therapies (In Progress)       Topic: Physical Therapy (In Progress)       Point: Mobility training (Done)       Learning Progress Summary             Patient Acceptance, E,TB,D, VU,NR by  at 3/29/2024 1009    Comment: education re: purpose of PT/importance of activity, safety/falls prevention, NWB L LE, improved tech w/ bed mobility, positioning for pressure relief                         Point: Home exercise program (Not Started)       Learner Progress:  Not documented in this visit.              Point: Precautions (Done)       Learning Progress Summary             Patient Acceptance, E,TB,D, VU,NR by  at 3/29/2024 1009    Comment: education re: purpose of PT/importance of activity, safety/falls prevention, NWB L LE, improved tech w/ bed mobility, positioning for pressure relief                                         User Key       Initials Effective Dates Name Provider Type Discipline     08/02/18 -  Melly Mustafa, PT Physical Therapist PT                  PT Recommendation and Plan     Plan of Care Reviewed With: patient  Progress: improving  Outcome Evaluation: PT tx completed. Pt c/o no pain BLE's. Donned cam boot to L foot for transfer to chair. Pt requires little assistance with mobility. WBAT per Dr. Cerrato with cam boot on. UP in chair with exit alarm.   Outcome Measures       Row Name 03/30/24 1300 03/29/24 1400          How much help from another person do you currently need...    Turning from your back to your side while in  flat bed without using bedrails? 4  -KJ --     Moving from lying on back to sitting on the side of a flat bed without bedrails? 4  -KJ --     Moving to and from a bed to a chair (including a wheelchair)? 3  -KJ --     Standing up from a chair using your arms (e.g., wheelchair, bedside chair)? 3  -KJ --     Climbing 3-5 steps with a railing? 3  -KJ --     To walk in hospital room? 3  -KJ --     AM-PAC 6 Clicks Score (PT) 20  -KJ --     Highest Level of Mobility Goal 6 --> Walk 10 steps or more  -KJ --        How much help from another is currently needed...    Putting on and taking off regular lower body clothing? -- 1  -AC (r) HW (t) AC (c)     Bathing (including washing, rinsing, and drying) -- 1  -AC (r) HW (t) AC (c)     Toileting (which includes using toilet bed pan or urinal) -- 2  -AC (r) HW (t) AC (c)     Putting on and taking off regular upper body clothing -- 3  -AC (r) HW (t) AC (c)     Taking care of personal grooming (such as brushing teeth) -- 2  -AC (r) HW (t) AC (c)     Eating meals -- 3  -AC (r) HW (t) AC (c)     AM-[facility] 6 Clicks Score (OT) -- 12  -AC (r) HW (t)        Functional Assessment    Outcome Measure Options AM-PAC 6 Clicks Basic Mobility (PT)  -KJ AM-[facility] 6 Clicks Daily Activity (OT)  -AC (r) HW (t) AC (c)               User Key  (r) = Recorded By, (t) = Taken By, (c) = Cosigned By      Initials Name Provider Type    AC Ezekiel Blake, OTR/L, CNT Occupational Therapist    Claudia Maddox, PTA Physical Therapist Assistant    Yulia Escobar, OT Student OT Student                     Time Calculation:    PT Charges       Row Name 03/30/24 1315             Time Calculation    Start Time 1250  -KJ      Stop Time 1315  -KJ      Time Calculation (min) 25 min  -KJ      PT Received On 03/30/24  -KJ      PT Goal Re-Cert Due Date 04/08/24  -KJ         Time Calculation- PT    Total Timed Code Minutes- PT 25 minute(s)  -KJ                User Key  (r) = Recorded By, (t) = Taken By, (c) = Cosigned  By      Initials Name Provider Type    Claudia Maddox PTA Physical Therapist Assistant                  Therapy Charges for Today       Code Description Service Date Service Provider Modifiers Qty    55320958661 HC PT THER PROC EA 15 MIN 3/30/2024 Claudia Prakash, PARUL GP 1    65900243687 HC PT THERAPEUTIC ACT EA 15 MIN 3/30/2024 Claudia Prakash PTA GP 1            PT G-Codes  Outcome Measure Options: AM-PAC 6 Clicks Basic Mobility (PT)  AM-PAC 6 Clicks Score (PT): 20  AM-PAC 6 Clicks Score (OT): 12    Claudia Prakash PTA  3/30/2024

## 2024-03-30 NOTE — PROGRESS NOTES
"Pharmacy Dosing Service  Automatic Renal Adjustment  Famotidine    Assessment/Action/Plan:  Patient w/ WANDER (worsening). Based on prescribing information & [facility] policy, famotidine 20mg PO BID has been adjusted to famotidine 20mg PO daily. Pharmacy will continue to monitor and make further adjustment(s) accordingly.     Subjective:  Erick Luong is a 68 y.o. male     Objective:  Ht: 182.9 cm (72\"); Wt: 134 kg (295 lb 6.7 oz)  Estimated Creatinine Clearance: 37 mL/min (A) (by C-G formula based on SCr of 2.74 mg/dL (H)).     Creatinine   Date Value Ref Range Status   03/30/2024 2.74 (H) 0.76 - 1.27 mg/dL Final   03/29/2024 2.07 (H) 0.76 - 1.27 mg/dL Final   03/28/2024 2.03 (H) 0.76 - 1.27 mg/dL Final   01/03/2022 2.6 (H) 0.5 - 1.2 mg/dL Final   07/21/2021 2.30 (H) 0.60 - 1.30 mg/dL Final     Comment:     Serial Number: [id number]:  [id number]       Reuben Esposito, PharmD  03/30/24 15:42 CDT    "

## 2024-03-30 NOTE — PROGRESS NOTES
Nephrology ([facility]) Progress Note      Patient:  Erick Luong  YOB: 1955  Date of Service: 3/30/2024  MRN: [id number]   Acct: [id number]   Primary Care Physician: Del Shetty MD  Advance Directive:   Code Status and Medical Interventions:   Ordered at: 03/26/24 1815     Level Of Support Discussed With:    Health Care Surrogate     Code Status (Patient has no pulse and is not breathing):    CPR (Attempt to Resuscitate)     Medical Interventions (Patient has pulse or is breathing):    Full Support     Admit Date: 3/26/2024       Hospital Day: 4  Referring Provider: No ref. provider found      Patient personally seen and examined.  Complete chart including Consults, Notes, Operative Reports, Labs, Cardiology, and Radiology studies reviewed as able.    Chief complaint: Abnormal labs.    Subjective:  Erick Luong is a 68 y.o. male for whom we were consulted for evaluation and treatment of acute kidney injury.  He has stage IIIb chronic kidney disease baseline, follows with Dr Lomas in our office.  Baseline creatinine approximately 1.8. He has history of insulin-dependent type 2 diabetes, hypertension, abdominal obesity, obstructive sleep apnea, diabetic foot ulcer and coronary artery disease.   Patient presented to ER on 3/26 with altered mental status. Had debridement of ongoing wound on left foot the day prior. Left foot warm and erythremic on arrival to ER. Noted to have elevated blood glucose over 400. Initial creatinine of 1.9.  Admitted to medical floor for sepsis due to left foot wound. Patient has been agitated and confused during the admission, requiring wrist restraints due to pulling at lines and tubes.     This morning's patient is more alert and awake.  He is no longer restrained or disoriented.  He has good appetite.  His renal function remained stable.    Allergies:  Cefepime, Bactrim [sulfamethoxazole-trimethoprim], Vancomycin, Zolpidem, Zolpidem  tartrate, and Metronidazole    Home Meds:  Medications Prior to Admission   Medication Sig Dispense Refill Last Dose    amitriptyline (ELAVIL) 25 MG tablet Take 2 tablets by mouth Daily at bedtime. (Patient not taking: Reported on 3/27/2024) 60 tablet 1 Not Taking    apixaban (ELIQUIS) 5 MG tablet tablet Take 1 tablet by mouth Every 12 (Twelve) Hours.       ascorbic acid (VITAMIN C) 1000 MG tablet Take 1 tablet by mouth Daily. 30 tablet 3     Aspirin 81 MG capsule Take 81 mg by mouth Daily.       bumetanide (BUMEX) 1 MG tablet Take 1 tablet every day by oral route for 30 days. 30 tablet 0     bumetanide (BUMEX) 2 MG tablet Take 1 tablet by mouth Daily. Take 2 tablets in morning;1 tablet at night (Patient not taking: Reported on 3/27/2024)   Not Taking    busPIRone (BUSPAR) 10 MG tablet Take 1 tablet by mouth 3 (Three) Times a Day.       calcitriol (ROCALTROL) 0.5 MCG capsule Take 1 capsule by mouth Daily. 90 capsule 4     calcitriol (ROCALTROL) 0.5 MCG capsule Take 1 capsule every day by oral route for 90 days. (Patient not taking: Reported on 3/27/2024) 90 capsule 4 Not Taking    carvedilol (COREG) 3.125 MG tablet Take 1 tablet twice a day by oral route. 60 tablet 4     carvedilol (COREG) 6.25 MG tablet Take 1 tablet by mouth 2 (Two) Times a Day. (Patient not taking: Reported on 3/27/2024) 180 tablet 4 Not Taking    Cholecalciferol (D-5000) 125 MCG (5000 UT) tablet Take 1 tablet by mouth Daily Before Lunch. 30 tablet 2     cloNIDine (CATAPRES) 0.1 MG tablet Take 1 tablet by mouth Every 12 (Twelve) Hours. (Patient not taking: Reported on 3/27/2024) 60 tablet 2 Not Taking    Diclofenac Sodium (VOLTAREN) 1 % gel gel Apply 2 g topically to the appropriate area as directed 4 (Four) Times a Day As Needed. 300 g 11     Diclofenac Sodium (VOLTAREN) 1 % gel gel Apply 2 g topically to the appropriate area as directed 4 (Four) Times a Day. (Patient not taking: Reported on 3/27/2024) 300 g 11 Not Taking    donepezil (ARICEPT)  10 MG tablet Take 1 tablet by mouth Daily. (Patient not taking: Reported on 3/27/2024) 90 tablet 4 Not Taking    Dulaglutide (Trulicity) 4.5 MG/0.5ML solution pen-injector Inject 0.5 mL under the skin into the appropriate area as directed 1 (One) Time Per Week. (Patient not taking: Reported on 3/27/2024) 2 mL 11 Not Taking    DULoxetine (CYMBALTA) 60 MG capsule Take 1 capsule by mouth Daily. 90 capsule 4     DULoxetine (CYMBALTA) 60 MG capsule Take 1 capsule by mouth Daily. (Patient not taking: Reported on 3/27/2024) 90 capsule 4 Not Taking    DULoxetine (CYMBALTA) 60 MG capsule Take 1 capsule by mouth Daily. (Patient not taking: Reported on 3/27/2024) 90 capsule 4 Not Taking    empagliflozin (JARDIANCE) 25 MG tablet tablet Take 1 tablet by mouth Daily. (Patient not taking: Reported on 3/27/2024) 30 tablet 2 Not Taking    famotidine (PEPCID) 20 MG tablet Take 1 tablet twice a day by oral route. 180 tablet 4     fluticasone (FLONASE) 50 MCG/ACT nasal spray 1 spray into the nostril(s) as directed by provider Daily. (Patient taking differently: 1 spray into the nostril(s) as directed by provider 2 (Two) Times a Day.) 16 g 1     HYDROcodone-acetaminophen (NORCO) 7.5-325 MG per tablet Take 1 tablet by mouth 2 (Two) Times a Day As Needed. (Patient not taking: Reported on 3/27/2024) 40 tablet 0 Not Taking    Insulin Glargine (Lantus SoloStar) 100 UNIT/ML injection pen Inject 20 Units under the skin into the appropriate area as directed Every Evening. 15 mL 3     Insulin Regular Human, Conc, (HumuLIN R U-500 KwikPen) 500 UNIT/ML solution pen-injector CONCENTRATED injection Inject 120 Units under the skin into the appropriate area as directed 2 (Two) Times a Day with breakfast and dinner. (Patient not taking: Reported on 3/27/2024) 18 mL 5 Not Taking    Insulin Regular Human, Conc, (HumuLIN R U-500 KwikPen) 500 UNIT/ML solution pen-injector CONCENTRATED injection Inject 40 Units under the skin into the appropriate area with  regular meals AND 40 Units with large meals. 54 mL 3     isosorbide mononitrate (IMDUR) 30 MG 24 hr tablet Take 1 tablet by mouth Daily.       magnesium hydroxide (Milk of Magnesia) 400 MG/5ML suspension Take 30 mL every day by oral route for 30 days. 900 mL 0     magnesium hydroxide (Milk of Magnesia) 400 MG/5ML suspension Take 30mL by mouth once daily for 30 days. (Patient not taking: Reported on 3/27/2024) 900 mL 0 Not Taking    melatonin 3 MG tablet Take 1 tablet by mouth At Night As Needed for Sleep.       methylcellulose (Citrucel) oral powder Mix 5g as directed and drink twice daily for 90 days. 900 g 10     metoclopramide (REGLAN) 5 MG tablet Take 1 tablet by mouth 3 times a day.       midodrine (PROAMATINE) 2.5 MG tablet Take 1 tablet by mouth 3 (Three) Times a Day Before Meals.       nitroglycerin (NITROSTAT) 0.4 MG SL tablet Dissolve 1 tablet by mouth every 5 minutes as needed for chest pain; MAX 3 tabs/24 hours.  If no improvement after 3 tabs go to ER 25 tablet 0     ondansetron (ZOFRAN) 4 MG tablet Take 1 tablet by mouth Every 6 (Six) Hours As Needed for Nausea or Vomiting.       oxyCODONE (ROXICODONE) 10 MG tablet Take 1 tablet by mouth twice a day as needed 60 tablet 0     pantoprazole (Protonix) 40 MG EC tablet Take 1 tablet by mouth 2 (Two) Times a Day. (Patient not taking: Reported on 3/27/2024) 180 tablet 4 Not Taking    polyethylene glycol (MIRALAX) 17 g packet Take 17 g by mouth Daily. Obtain OTC       prazosin (MINIPRESS) 1 MG capsule Take 1 capsule by mouth every night at bedtime. 30 capsule 2     pregabalin (LYRICA) 100 MG capsule Take 2 capsules by mouth Every Night.       pregabalin (LYRICA) 50 MG capsule Take 1 capsule by mouth Daily.       rosuvastatin (CRESTOR) 10 MG tablet Take 1 tablet by mouth Daily.       sennosides-docusate (PERICOLACE) 8.6-50 MG per tablet Take 1 tablet by mouth Every Night. Obtain OTC       sennosides-docusate (PERICOLACE) 8.6-50 MG per tablet Take 1 tablet by  mouth every night at bedtime. (Patient not taking: Reported on 3/27/2024) 30 tablet 2 Not Taking    sodium hypochlorite (DAKIN'S 1/4 STRENGTH) 0.125 % solution topical solution 0.125% Apply to affected area twice daily (Patient not taking: Reported on 3/27/2024) 473 mL 3 Not Taking    sodium hypochlorite (DAKIN'S 1/4 STRENGTH) 0.125 % solution topical solution 0.125% Apply to affected area twice daily as directed (Patient not taking: Reported on 3/27/2024) 473 mL 5 Not Taking    sodium hypochlorite (DAKIN'S) 0.25 % topical solution Use to wound daily as instructed 473 mL 1     sucralfate (CARAFATE) 1 g tablet Take 1 tablet by mouth 3 times a day.       tamsulosin (FLOMAX) 0.4 MG capsule 24 hr capsule Take 1 capsule by mouth Daily. 90 capsule 1     timolol (TIMOPTIC) 0.5 % ophthalmic solution Administer 1 drop to both eyes 2 (Two) Times a Day.       valsartan (DIOVAN) 40 MG tablet Take 1 tablet by mouth Daily.       zinc sulfate (ZINCATE) 50 MG capsule Take 1 capsule by mouth Daily.          Medicines:  Current Facility-Administered Medications   Medication Dose Route Frequency Provider Last Rate Last Admin    acetaminophen (TYLENOL) tablet 650 mg  650 mg Oral Q4H PRN Raquel Duncan APRN   650 mg at 03/27/24 1005    Or    acetaminophen (TYLENOL) 160 MG/5ML oral solution 650 mg  650 mg Oral Q4H PRN Raquel Duncan APRN   650 mg at 03/27/24 2109    Or    acetaminophen (TYLENOL) suppository 650 mg  650 mg Rectal Q4H PRN Raquel Duncan, APRN        apixaban (ELIQUIS) tablet 5 mg  5 mg Oral Q12H Raquel Duncan APRN   5 mg at 03/30/24 0836    ascorbic acid (VITAMIN C) tablet 1,000 mg  1,000 mg Oral Daily Rene Maloney MD   1,000 mg at 03/30/24 0836    aspirin EC tablet 81 mg  81 mg Oral Daily Rene Maloney MD   81 mg at 03/30/24 0836    sennosides-docusate (PERICOLACE) 8.6-50 MG per tablet 1 tablet  1 tablet Oral BID Raquel Duncan APRN   1 tablet at 03/28/24 2126    And    polyethylene glycol  (MIRALAX) packet 17 g  17 g Oral Daily PRN Raquel Duncan, SUSAN        And    bisacodyl (DULCOLAX) EC tablet 5 mg  5 mg Oral Daily PRN Raquel Duncan APRN        And    bisacodyl (DULCOLAX) suppository 10 mg  10 mg Rectal Daily PRN Raquel Duncan, SUSAN        bumetanide (BUMEX) tablet 1 mg  1 mg Oral Daily Raquel Duncan APRN   1 mg at 03/30/24 0836    calcitriol (ROCALTROL) capsule 0.5 mcg  0.5 mcg Oral Daily eRne Maloney MD   0.5 mcg at 03/30/24 0836    carvedilol (COREG) tablet 3.125 mg  3.125 mg Oral BID With Meals Rene Maloney MD   3.125 mg at 03/28/24 1836    dextrose (D50W) (25 g/50 mL) IV injection 25 g  25 g Intravenous Q15 Min PRN Raquel Duncan APRN        dextrose (GLUTOSE) oral gel 15 g  15 g Oral Q15 Min PRN Raquel Duncan APRN   15 g at 03/27/24 1710    Diclofenac Sodium (VOLTAREN) 1 % gel 2 g  2 g Topical 4x Daily PRN Rene Maloney MD        donepezil (ARICEPT) tablet 10 mg  10 mg Oral Daily Raquel Duncan APRN   10 mg at 03/30/24 0836    [START ON 3/31/2024] famotidine (PEPCID) tablet 20 mg  20 mg Oral Daily Rene Maloney MD        glucagon (GLUCAGEN) injection 1 mg  1 mg Intramuscular Q15 Min PRN Raquel Duncan APRN        haloperidol lactate (HALDOL) injection 5 mg  5 mg Intravenous Q6H PRN Rene Maloney MD        insulin detemir (LEVEMIR) injection 30 Units  30 Units Subcutaneous Nightly Raquel Duncan APRN   30 Units at 03/29/24 2056    Insulin Lispro (humaLOG) injection 4-24 Units  4-24 Units Subcutaneous 4x Daily AC & at Bedtime Raquel Duncan APRN   4 Units at 03/28/24 1838    insulin regular (humuLIN R,novoLIN R) injection 10 Units  10 Units Subcutaneous TID Steve Remy MD   10 Units at 03/30/24 1132    isosorbide mononitrate (IMDUR) 24 hr tablet 30 mg  30 mg Oral Q24H Rene Maloney MD   30 mg at 03/30/24 0836    Linezolid (ZYVOX) 600 mg 300 mL  600 mg Intravenous Q12H Raquel Duncan APRN 300 mL/hr  at 03/30/24 1132 600 mg at 03/30/24 1132    LORazepam (ATIVAN) injection 1 mg  1 mg Intravenous Q4H PRN Rene Maloney MD   1 mg at 03/29/24 0140    magnesium hydroxide (MILK OF MAGNESIA) suspension 10 mL  10 mL Oral Daily PRN Rene Maloney MD        melatonin tablet 6 mg  6 mg Oral Nightly Rene Maloney MD   6 mg at 03/29/24 2036    midodrine (PROAMATINE) tablet 2.5 mg  2.5 mg Oral TID AC Rene Maloney MD   2.5 mg at 03/30/24 1132    mupirocin (BACTROBAN) 2 % nasal ointment 1 Application  1 Application Each Nare BID Nadia Polo, APRN   1 Application at 03/30/24 0836    nitroglycerin (NITROSTAT) SL tablet 0.4 mg  0.4 mg Sublingual Q5 Min PRN Raquel Duncan, APRN        ondansetron ODT (ZOFRAN-ODT) disintegrating tablet 4 mg  4 mg Oral Q6H PRN Raquel Duncan, APRN   4 mg at 03/30/24 1132    Or    ondansetron (ZOFRAN) injection 4 mg  4 mg Intravenous Q6H PRN Raquel Duncan, APRN   4 mg at 03/29/24 1837    oxyCODONE (ROXICODONE) immediate release tablet 10 mg  10 mg Oral BID PRN Raquel Duncan, APRN   10 mg at 03/30/24 0013    pregabalin (LYRICA) capsule 100 mg  100 mg Oral Nightly Rene Maloney MD   100 mg at 03/29/24 2036    pregabalin (LYRICA) capsule 50 mg  50 mg Oral Daily Rene Maloney MD   50 mg at 03/30/24 0841    rosuvastatin (CRESTOR) tablet 10 mg  10 mg Oral Nightly Rene Maloney MD   10 mg at 03/29/24 2036    sodium chloride 0.9 % flush 10 mL  10 mL Intravenous PRN Steve De Jesus MD        sodium chloride 0.9 % flush 10 mL  10 mL Intravenous Q12H Raquel Duncan, APRN   10 mL at 03/30/24 0837    sodium chloride 0.9 % flush 10 mL  10 mL Intravenous PRN Raquel Duncan APRN        sodium chloride 0.9 % infusion 40 mL  40 mL Intravenous PRN Raquel Duncan, SUSAN        sucralfate (CARAFATE) 1 GM/10ML suspension 1 g  1 g Oral TID AC Rene Maloney MD   1 g at 03/30/24 1132    tamsulosin (FLOMAX) 24 hr capsule 0.4 mg  0.4 mg Oral  Daily Rene Maloney MD   0.4 mg at 03/30/24 0838    timolol (TIMOPTIC) 0.25 % ophthalmic solution 1 drop  1 drop Both Eyes Q12H Rene Maloney MD   1 drop at 03/30/24 0837    vitamin D3 capsule 5,000 Units  5,000 Units Oral Daily Rene Maloney MD   5,000 Units at 03/30/24 0836    zinc sulfate (ZINCATE) capsule 220 mg  220 mg Oral Daily Rene Maloney MD   220 mg at 03/30/24 0836       Past Medical History:  Past Medical History:   Diagnosis Date    Arthritis     Autonomic disease     CAD (coronary artery disease) 02/06/2017    Cervical radiculopathy 09/16/2021    Chronic constipation with acute exaccerbation 05/10/2021    Coronary artery disease     Degeneration of cervical intervertebral disc 08/11/2021    Diabetes mellitus     Diabetic foot ulcer 08/31/2020    Diabetic polyneuropathy associated with type 2 diabetes mellitus 01/18/2021    Elevated cholesterol     Gastroesophageal reflux disease 05/13/2019    Headache     HTN (hypertension), benign 05/03/2017    Hyperlipidemia     Hypertension     Mixed hyperlipidemia 02/07/2017    Multiple lung nodules 01/26/2020    5mm, 9 mm RLL identified 1/2020, not present 10/2019.    Myocardial infarction     Osteomyelitis 01/22/2020    Osteomyelitis of fifth toe of right foot 10/07/2019    Pancreatitis     Persistent insomnia 01/20/2020    Renal disorder     Sleep apnea 02/06/2017    Sleep apnea with use of continuous positive airway pressure (CPAP)     NON-COMPLIANT    Slow transit constipation 01/16/2019    Spinal stenosis in cervical region 09/16/2021    Vitamin D deficiency 03/02/2021       Past Surgical History:  Past Surgical History:   Procedure Laterality Date    ABDOMINAL SURGERY      AMPUTATION FOOT / TOE Left 10/2021    5th digit     ANTERIOR CERVICAL DISCECTOMY W/ FUSION N/A 8/5/2022    Procedure: CERVICAL DISCECTOMY ANTERIOR WITH FUSION C5-6 with possible plating of C5-7 with neuromonitoring and 1 c-arm;  Surgeon: Karel Soliz MD;   Location:  [facility] OR;  Service: Neurosurgery;  Laterality: N/A;    APPENDECTOMY      BACK SURGERY      CARDIAC CATHETERIZATION Left 2021    Procedure: Left Heart Cath w poss intervention left anatomical snuff box acess;  Surgeon: Omkar Charles DO;  Location:  [facility] CATH INVASIVE LOCATION;  Service: Cardiology;  Laterality: Left;    CARDIAC SURGERY      CATARACT EXTRACTION      CERVICAL SPINE SURGERY      COLONOSCOPY N/A 2017    Normal exam repeat in 5 years    COLONOSCOPY N/A 2019    Mild acute inflammation    COLONOSCOPY W/ POLYPECTOMY  2014    Hyperplastic polyp    CORONARY ARTERY BYPASS GRAFT  10/2015    ENDOSCOPY  2011    Gastritis with hemorrhage    ENDOSCOPY N/A 2017    Normal exam    ENDOSCOPY N/A 2019    Gastritis    ENDOSCOPY N/A 2020    Non-erosive gastritis with hemorrhage    ENDOSCOPY N/A 02/10/2021    Esophagitis    FOOT SURGERY Left     INCISION AND DRAINAGE OF WOUND Left 2007    spider bite       Family History  Family History   Problem Relation Age of Onset    Colon cancer Father     Heart disease Father     Colon cancer Sister     Colon polyps Sister     Alzheimer's disease Mother     Coronary artery disease Sister     Coronary artery disease Sister        Social History  Social History     Socioeconomic History    Marital status:    Tobacco Use    Smoking status: Former     Current packs/day: 0.00     Types: Cigarettes     Quit date:      Years since quittin.2    Smokeless tobacco: Never    Tobacco comments:     smoked in highschool   Vaping Use    Vaping status: Never Used   Substance and Sexual Activity    Alcohol use: No    Drug use: No    Sexual activity: Defer       Review of Systems:  History obtained from chart review and the patient  General ROS: No fever or chills  Respiratory ROS: No cough, shortness of breath, wheezing  Cardiovascular ROS: No chest pain or palpitations  Gastrointestinal ROS: No abdominal pain  or melena  Genito-Urinary ROS: No dysuria or hematuria  Psych ROS: No anxiety and depression  14 point ROS reviewed with the patient and negative except as noted above and in the HPI unless unable to obtain.    Objective:  Patient Vitals for the past 24 hrs:   BP Temp Temp src Pulse Resp SpO2 Weight   03/30/24 1425 118/67 97.7 °F (36.5 °C) Oral 75 16 98 % --   03/30/24 1108 123/58 97.7 °F (36.5 °C) Oral 76 18 96 % --   03/30/24 0803 102/53 97.3 °F (36.3 °C) Oral 74 18 96 % --   03/30/24 0300 97/51 97.6 °F (36.4 °C) Oral 78 16 95 % 134 kg (295 lb 6.7 oz)   03/29/24 2348 118/57 97.6 °F (36.4 °C) Oral 89 16 96 % --   03/29/24 2017 116/55 98.7 °F (37.1 °C) Oral 78 16 98 % --       Intake/Output Summary (Last 24 hours) at 3/30/2024 1611  Last data filed at 3/30/2024 1417  Gross per 24 hour   Intake 840 ml   Output 750 ml   Net 90 ml     General: awake/alert   HEENT: Normocephalic atraumatic head  Neck: Supple with no JVD or carotid bruits  Chest:  clear to auscultation bilaterally without respiratory distress  CVS: regular rate and rhythm  Abdominal: soft, nontender, positive bowel sounds  Extremities:  bilateral 1+ edema  Skin: warm and dry without rash      Labs:  Results from last 7 days   Lab Units 03/30/24  0620 03/29/24  0551 03/28/24  1004   WBC 10*3/mm3 5.69 6.08 9.45   HEMOGLOBIN g/dL 10.0* 11.1* 13.7   HEMATOCRIT % 30.6* 33.6* 41.1   PLATELETS 10*3/mm3 155 156 154         Results from last 7 days   Lab Units 03/30/24  0620 03/29/24  0551 03/28/24  1004 03/27/24  0449 03/26/24  1326   SODIUM mmol/L 135* 138 140   < > 140   POTASSIUM mmol/L 3.5 3.3* 3.6   < > 3.4*   CHLORIDE mmol/L 100 103 102   < > 103   CO2 mmol/L 24.0 23.0 22.0   < > 23.0   BUN mg/dL 51* 40* 36*   < > 29*   CREATININE mg/dL 2.74* 2.07* 2.03*   < > 1.93*   CALCIUM mg/dL 8.2* 8.4* 9.2   < > 9.5   EGFR mL/min/1.73 24.6* 34.5* 35.3*   < > 37.5*   BILIRUBIN mg/dL 0.5 0.7  --   --  0.7   ALK PHOS U/L 95 81  --   --  91   ALT (SGPT) U/L 19 17  --    --  8   AST (SGOT) U/L 32 32  --   --  13   GLUCOSE mg/dL 104* 120* 79   < > 438*    < > = values in this interval not displayed.       Radiology:   Imaging Results (Last 72 Hours)       Procedure Component Value Units Date/Time    MRI Foot Left With & Without Contrast [[id number]] Collected: 03/29/24 1856     Updated: 03/29/24 1901    Narrative:      EXAMINATION: MRI FOOT LEFT W WO CONTRAST-     3/29/2024 4:30 PM     HISTORY: Foot swelling, diabetic, osteomyelitis suspected, xray done;  E11.628-Type 2 diabetes mellitus with other skin complications;  L08.9-Local infection of the skin and subcutaneous tissue, unspecified;  E11.65-Type 2 diabetes mellitus with hyperglycemia; Z74.09-Other reduced  mobility     LEFT foot MRI without and with IV gadolinium contrast.  Axial, sagittal, and coronal sequences.     COMPARISON:  LEFT foot x-rays from 3/26/2024.     There is a tiny metal foreign body within the plantar soft tissues  adjacent to the second toe and despite the small size it produces a  prominent amount of artifact related to image distortion.     I see no soft tissue abscess.     No discrete bone edema or bone enhancement is seen to indicate  osteomyelitis.       Impression:      1. There are some limitations to the study based on metal related  artifact.  2. No soft tissue abscess or definite bone marrow edema or enhancement  is seen to indicate osteomyelitis.           This report was signed and finalized on 3/29/2024 6:58 PM by Dr. Gomez Mcgill MD.               Culture:  Blood Culture   Date Value Ref Range Status   03/26/2024 Staphylococcus aureus, MRSA (C)  Final     Comment:       Infectious disease consultation is highly recommended to rule out distant foci of infection.  Methicillin resistant Staphylococcus aureus, Patient may be an isolation risk.   03/26/2024 Staphylococcus aureus, MRSA (C)  Final     Comment:       Infectious disease consultation is highly recommended to rule out distant foci of  infection.  Methicillin resistant Staphylococcus aureus, Patient may be an isolation risk.         Assessment    Acute kidney injury,/worsening  Acute tubular necrosis.  Baseline chronic kidney disease stage 3b  Type 2 diabetes, poorly controlled (A1c 10.8%)  Hypertension   Metabolic encephalopathy--improving  Anemia of CKD  Hypokalemia--improved  Sepsis due to infection of left foot wound    Plan:  Hold p.o. Bumex.  Urinary electrolytes.  Monitor renal      Brian Lomas MD  3/30/2024  16:11 CDT

## 2024-03-30 NOTE — PROGRESS NOTES
Urology  Length of Stay: 4  Patient Care Team:  Del Shetty MD as PCP - General (Family Medicine)  Emeka Garay MD as Cardiologist (Cardiology)  Cristopher Hannah MD as Consulting Physician (Gastroenterology)  Brian Lomas MD as Consulting Physician (Nephrology)  Karel Soliz MD as Surgeon (Neurosurgery)  Willy Romero APRN as Nurse Practitioner (Nurse Practitioner)  Dougie Taylor MD as Consulting Physician (Pulmonary Disease)    Chief Complaint: Lower urinary tract symptoms    Subjective     Interval History:   Patient said catheter removed.  He said he is voiding back to his baseline.  Apparently has been having some difficulty while he is at home.    Review of Systems:   Review of Systems    Objective       Intake/Output Summary (Last 24 hours) at 3/30/2024 1410  Last data filed at 3/30/2024 1050  Gross per 24 hour   Intake 720 ml   Output 1175 ml   Net -455 ml       [REMOVED] Urethral Catheter 16 Fr.-Output (mL): 50 mL (dc smith at 1642 pm)           Physical Exam:  Temp:  [97.3 °F (36.3 °C)-98.7 °F (37.1 °C)] 97.7 °F (36.5 °C)  Heart Rate:  [74-89] 76  Resp:  [16-18] 18  BP: ()/(45-58) 123/58  Bladder is not palpably distended       Results Review:       I reviewed the patient's new clinical results.  Lab Results (last 24 hours)       Procedure Component Value Units Date/Time    POC Glucose Once [[id number]]  (Abnormal) Collected: 03/30/24 1106    Specimen: Blood Updated: 03/30/24 1117     Glucose 145 mg/dL      Comment: : [id number] Julia Shelton ID: [id number]       Blood Culture With DARSHAN - Blood, Hand, Right [[id number]]  (Normal) Collected: 03/29/24 0843    Specimen: Blood from Hand, Right Updated: 03/30/24 0915     Blood Culture No growth at 24 hours    POC Glucose Once [[id number]]  (Normal) Collected: 03/30/24 0805    Specimen: Blood Updated: 03/30/24 0816     Glucose 90 mg/dL      Comment: : [id number] Julia Shelton ID: [id number]        Comprehensive Metabolic Panel [[id number]]  (Abnormal) Collected: 03/30/24 0620    Specimen: Blood Updated: 03/30/24 0650     Glucose 104 mg/dL      BUN 51 mg/dL      Creatinine 2.74 mg/dL      Sodium 135 mmol/L      Potassium 3.5 mmol/L      Chloride 100 mmol/L      CO2 24.0 mmol/L      Calcium 8.2 mg/dL      Total Protein 5.5 g/dL      Albumin 2.8 g/dL      ALT (SGPT) 19 U/L      AST (SGOT) 32 U/L      Alkaline Phosphatase 95 U/L      Total Bilirubin 0.5 mg/dL      Globulin 2.7 gm/dL      A/G Ratio 1.0 g/dL      BUN/Creatinine Ratio 18.6     Anion Gap 11.0 mmol/L      eGFR 24.6 mL/min/1.73     Narrative:      GFR Normal >60  Chronic Kidney Disease <60  Kidney Failure <15      CBC & Differential [[id number]]  (Abnormal) Collected: 03/30/24 0620    Specimen: Blood Updated: 03/30/24 0632    Narrative:      The following orders were created for panel order CBC & Differential.  Procedure                               Abnormality         Status                     ---------                               -----------         ------                     CBC Auto Differential[[id number]]        Abnormal            Final result                 Please view results for these tests on the individual orders.    CBC Auto Differential [[id number]]  (Abnormal) Collected: 03/30/24 0620    Specimen: Blood Updated: 03/30/24 0632     WBC 5.69 10*3/mm3      RBC 3.58 10*6/mm3      Hemoglobin 10.0 g/dL      Hematocrit 30.6 %      MCV 85.5 fL      MCH 27.9 pg      MCHC 32.7 g/dL      RDW 14.6 %      RDW-SD 46.5 fl      MPV 11.7 fL      Platelets 155 10*3/mm3      Neutrophil % 60.4 %      Lymphocyte % 23.0 %      Monocyte % 11.8 %      Eosinophil % 4.0 %      Basophil % 0.4 %      Immature Grans % 0.4 %      Neutrophils, Absolute 3.44 10*3/mm3      Lymphocytes, Absolute 1.31 10*3/mm3      Monocytes, Absolute 0.67 10*3/mm3      Eosinophils, Absolute 0.23 10*3/mm3      Basophils, Absolute 0.02 10*3/mm3      Immature Grans, Absolute 0.02 10*3/mm3       nRBC 0.0 /100 WBC     Blood Culture With DARSHAN - Blood, Arm, Left [[id number]] Collected: 03/30/24 0620    Specimen: Blood from Arm, Left Updated: 03/30/24 0631    CANDIDA AURIS SCREEN - Swab, Axilla Right, Axilla Left and Groin [[id number]]  (Normal) Collected: 03/27/24 0351    Specimen: Swab from Axilla Right, Axilla Left and Groin Updated: 03/30/24 0415     Candida Auris Screen Culture No Candida auris isolated at 3 days    POC Glucose Once [[id number]]  (Abnormal) Collected: 03/29/24 2039    Specimen: Blood Updated: 03/29/24 2050     Glucose 174 mg/dL      Comment: : [id number] Willam EmmaMeter ID: [id number]       POC Glucose Once [[id number]]  (Normal) Collected: 03/29/24 1710    Specimen: Blood Updated: 03/29/24 1721     Glucose 129 mg/dL      Comment: : [id number] Familia DeannaMeter ID: [id number]             Imaging Results (Last 24 Hours)       Procedure Component Value Units Date/Time    MRI Foot Left With & Without Contrast [[id number]] Collected: 03/29/24 1856     Updated: 03/29/24 1901    Narrative:      EXAMINATION: MRI FOOT LEFT W WO CONTRAST-     3/29/2024 4:30 PM     HISTORY: Foot swelling, diabetic, osteomyelitis suspected, xray done;  E11.628-Type 2 diabetes mellitus with other skin complications;  L08.9-Local infection of the skin and subcutaneous tissue, unspecified;  E11.65-Type 2 diabetes mellitus with hyperglycemia; Z74.09-Other reduced  mobility     LEFT foot MRI without and with IV gadolinium contrast.  Axial, sagittal, and coronal sequences.     COMPARISON:  LEFT foot x-rays from 3/26/2024.     There is a tiny metal foreign body within the plantar soft tissues  adjacent to the second toe and despite the small size it produces a  prominent amount of artifact related to image distortion.     I see no soft tissue abscess.     No discrete bone edema or bone enhancement is seen to indicate  osteomyelitis.       Impression:      1. There are some limitations to the study based on metal  related  artifact.  2. No soft tissue abscess or definite bone marrow edema or enhancement  is seen to indicate osteomyelitis.           This report was signed and finalized on 3/29/2024 6:58 PM by Dr. Gomez Mcgill MD.               Medication Review:     Current Facility-Administered Medications:     acetaminophen (TYLENOL) tablet 650 mg, 650 mg, Oral, Q4H PRN, 650 mg at 03/27/24 1005 **OR** acetaminophen (TYLENOL) 160 MG/5ML oral solution 650 mg, 650 mg, Oral, Q4H PRN, 650 mg at 03/27/24 2109 **OR** acetaminophen (TYLENOL) suppository 650 mg, 650 mg, Rectal, Q4H PRN, Raquel Duncan APRN    apixaban (ELIQUIS) tablet 5 mg, 5 mg, Oral, Q12H, Raquel Duncan APRN, 5 mg at 03/30/24 0836    ascorbic acid (VITAMIN C) tablet 1,000 mg, 1,000 mg, Oral, Daily, Rene Maloney MD, 1,000 mg at 03/30/24 0836    aspirin EC tablet 81 mg, 81 mg, Oral, Daily, Rene Maloney MD, 81 mg at 03/30/24 0836    sennosides-docusate (PERICOLACE) 8.6-50 MG per tablet 1 tablet, 1 tablet, Oral, BID, 1 tablet at 03/28/24 2126 **AND** polyethylene glycol (MIRALAX) packet 17 g, 17 g, Oral, Daily PRN **AND** bisacodyl (DULCOLAX) EC tablet 5 mg, 5 mg, Oral, Daily PRN **AND** bisacodyl (DULCOLAX) suppository 10 mg, 10 mg, Rectal, Daily PRN, Raquel Duncan APRN    bumetanide (BUMEX) tablet 1 mg, 1 mg, Oral, Daily, Raquel Duncan APRN, 1 mg at 03/30/24 0836    calcitriol (ROCALTROL) capsule 0.5 mcg, 0.5 mcg, Oral, Daily, Rene Maloney MD, 0.5 mcg at 03/30/24 0836    carvedilol (COREG) tablet 3.125 mg, 3.125 mg, Oral, BID With Meals, Rene Maloney MD, 3.125 mg at 03/28/24 1836    dextrose (D50W) (25 g/50 mL) IV injection 25 g, 25 g, Intravenous, Q15 Min PRN, Raquel Duncan, SUSAN    dextrose (GLUTOSE) oral gel 15 g, 15 g, Oral, Q15 Min PRN, Raquel Duncan, SUSAN, 15 g at 03/27/24 1710    Diclofenac Sodium (VOLTAREN) 1 % gel 2 g, 2 g, Topical, 4x Daily PRN, Rene Maloney MD    donepezil (ARICEPT) tablet 10  mg, 10 mg, Oral, Daily, Raquel Duncan, APRN, 10 mg at 03/30/24 0836    famotidine (PEPCID) tablet 20 mg, 20 mg, Oral, BID Amara CANELA Sotonte E, MD, 20 mg at 03/30/24 0836    glucagon (GLUCAGEN) injection 1 mg, 1 mg, Intramuscular, Q15 Min PRN, Raquel Duncan, SUSAN    haloperidol lactate (HALDOL) injection 5 mg, 5 mg, Intravenous, Q6H PRN, Rene Maloney MD    insulin detemir (LEVEMIR) injection 30 Units, 30 Units, Subcutaneous, Nightly, Raquel Duncan APRN, 30 Units at 03/29/24 2056    Insulin Lispro (humaLOG) injection 4-24 Units, 4-24 Units, Subcutaneous, 4x Daily AC & at Bedtime, Raquel Duncan APRN, 4 Units at 03/28/24 1838    insulin regular (humuLIN R,novoLIN R) injection 10 Units, 10 Units, Subcutaneous, TID Neal CANELA Andrew Coker, MD, 10 Units at 03/30/24 1132    isosorbide mononitrate (IMDUR) 24 hr tablet 30 mg, 30 mg, Oral, Q24H, Rene Maloney MD, 30 mg at 03/30/24 0836    Linezolid (ZYVOX) 600 mg 300 mL, 600 mg, Intravenous, Q12H, Raquel Duncan, SUSAN, Last Rate: 300 mL/hr at 03/30/24 1132, 600 mg at 03/30/24 1132    LORazepam (ATIVAN) injection 1 mg, 1 mg, Intravenous, Q4H PRN, Rene Maloney MD, 1 mg at 03/29/24 0140    magnesium hydroxide (MILK OF MAGNESIA) suspension 10 mL, 10 mL, Oral, Daily PRN, Rene Maloney MD    melatonin tablet 6 mg, 6 mg, Oral, Nightly, Rene Maloney MD, 6 mg at 03/29/24 2036    midodrine (PROAMATINE) tablet 2.5 mg, 2.5 mg, Oral, TID ACAmara Sotonte E, MD, 2.5 mg at 03/30/24 1132    mupirocin (BACTROBAN) 2 % nasal ointment 1 Application, 1 Application, Each Nare, BID, Nadia Polo, APRN, 1 Application at 03/30/24 0836    nitroglycerin (NITROSTAT) SL tablet 0.4 mg, 0.4 mg, Sublingual, Q5 Min PRN, Raquel Duncan, APRN    ondansetron ODT (ZOFRAN-ODT) disintegrating tablet 4 mg, 4 mg, Oral, Q6H PRN, 4 mg at 03/30/24 1132 **OR** ondansetron (ZOFRAN) injection 4 mg, 4 mg, Intravenous, Q6H PRN, Raquel Duncan, APRN, 4  mg at 03/29/24 1837    oxyCODONE (ROXICODONE) immediate release tablet 10 mg, 10 mg, Oral, BID PRN, Raquel Duncan, APRN, 10 mg at 03/30/24 0013    pregabalin (LYRICA) capsule 100 mg, 100 mg, Oral, Nightly, Rene Maloney MD, 100 mg at 03/29/24 2036    pregabalin (LYRICA) capsule 50 mg, 50 mg, Oral, Daily, Rene Maloney MD, 50 mg at 03/30/24 0841    rosuvastatin (CRESTOR) tablet 10 mg, 10 mg, Oral, Nightly, Rene Maloney MD, 10 mg at 03/29/24 2036    sodium chloride 0.9 % flush 10 mL, 10 mL, Intravenous, PRN, Steve De Jesus MD    sodium chloride 0.9 % flush 10 mL, 10 mL, Intravenous, Q12H, Raquel Duncan APRN, 10 mL at 03/30/24 0837    sodium chloride 0.9 % flush 10 mL, 10 mL, Intravenous, PRN, Raquel Duncan, SUSAN    sodium chloride 0.9 % infusion 40 mL, 40 mL, Intravenous, PRN, Raquel Duncan, SUSAN    sucralfate (CARAFATE) 1 GM/10ML suspension 1 g, 1 g, Oral, TID AC, Rene Maloney MD, 1 g at 03/30/24 1132    tamsulosin (FLOMAX) 24 hr capsule 0.4 mg, 0.4 mg, Oral, Daily, Rene Maloney MD, 0.4 mg at 03/30/24 0838    timolol (TIMOPTIC) 0.25 % ophthalmic solution 1 drop, 1 drop, Both Eyes, Q12H, Rene Maloney MD, 1 drop at 03/30/24 0837    vitamin D3 capsule 5,000 Units, 5,000 Units, Oral, Daily, Rene Maloney MD, 5,000 Units at 03/30/24 0836    zinc sulfate (ZINCATE) capsule 220 mg, 220 mg, Oral, Daily, Rene Maloney MD, 220 mg at 03/30/24 0836    Assessment/Plan:   BPH with lower urinary tract symptoms  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  -We will continue his tamsulosin at current dose of 0.4 mg.  -Will set him up to see Pepe Davis PA-C at [facility] urology.  -No further inpatient urologic management needed.    (Please note that portions of this note were completed with a voice recognition program.)  Ritchie Arechiga MD  03/30/24  14:10  CDT

## 2024-03-30 NOTE — PROGRESS NOTES
"INFECTIOUS DISEASES PROGRESS NOTE    Patient:  Erick Luong  YOB: 1955  MRN: [id number]   Admit date: 3/26/2024   Admitting Physician: Rene Maloney MD  Primary Care Physician: Del Shetty MD    Chief Complaint: No complaints offered      Interval History: ELVIS Cabrera at bedside with patient.  She noted he is feeding himself and brighter today.    He did ask \"how is the infection?\"    MRI yesterday without evidence of abscess or definitive osteomyelitis.    Allergies:   Allergies   Allergen Reactions    Cefepime Hives and Anaphylaxis    Bactrim [Sulfamethoxazole-Trimethoprim] Other (See Comments)     \"RENAL FAILURE\"    Vancomycin Itching    Zolpidem Unknown - High Severity     \"makes him crazy\"    Zolpidem Tartrate Unknown - Low Severity and Provider Review Needed    Metronidazole Rash       Current Scheduled Medications:   apixaban, 5 mg, Oral, Q12H  ascorbic acid, 1,000 mg, Oral, Daily  aspirin, 81 mg, Oral, Daily  bumetanide, 1 mg, Oral, Daily  calcitriol, 0.5 mcg, Oral, Daily  carvedilol, 3.125 mg, Oral, BID With Meals  donepezil, 10 mg, Oral, Daily  famotidine, 20 mg, Oral, BID AC  insulin detemir, 30 Units, Subcutaneous, Nightly  insulin lispro, 4-24 Units, Subcutaneous, 4x Daily AC & at Bedtime  insulin regular, 10 Units, Subcutaneous, TID AC  isosorbide mononitrate, 30 mg, Oral, Q24H  Linezolid, 600 mg, Intravenous, Q12H  melatonin, 6 mg, Oral, Nightly  midodrine, 2.5 mg, Oral, TID AC  mupirocin, 1 Application, Each Nare, BID  pregabalin, 100 mg, Oral, Nightly  pregabalin, 50 mg, Oral, Daily  rosuvastatin, 10 mg, Oral, Nightly  senna-docusate sodium, 1 tablet, Oral, BID  sodium chloride, 10 mL, Intravenous, Q12H  sucralfate, 1 g, Oral, TID AC  tamsulosin, 0.4 mg, Oral, Daily  timolol, 1 drop, Both Eyes, Q12H  vitamin D3, 5,000 Units, Oral, Daily  zinc sulfate, 220 mg, Oral, Daily      Current PRN Medications:    acetaminophen **OR** acetaminophen **OR** acetaminophen    " "senna-docusate sodium **AND** polyethylene glycol **AND** bisacodyl **AND** bisacodyl    dextrose    dextrose    Diclofenac Sodium    glucagon (human recombinant)    haloperidol lactate    LORazepam    magnesium hydroxide    nitroglycerin    ondansetron ODT **OR** ondansetron    oxyCODONE    sodium chloride    sodium chloride    sodium chloride            Objective     Vital Signs:  Temp (24hrs), Av.2 °F (36.8 °C), Min:97.3 °F (36.3 °C), Max:99.3 °F (37.4 °C)      /53 (BP Location: Right arm, Patient Position: Lying)   Pulse 74   Temp 97.3 °F (36.3 °C) (Oral)   Resp 18   Ht 182.9 cm (72\")   Wt 134 kg (295 lb 6.7 oz)   SpO2 96%   BMI 40.07 kg/m²         Physical Exam:  General: Patient sitting up some in bed in no acute distress.  Breakfast tray in front of him  Respiratory: Effort even and unlabored  Left foot dressing clean dry and intact                          Results Review:    I reviewed the patient's new clinical results.    Lab Results:    CBC:   Lab Results   Lab 24  1326 24  0449 24  1004 24  0551 24  0620   WBC 14.69* 12.75* 9.45 6.08 5.69   HEMOGLOBIN 11.3* 11.8* 13.7 11.1* 10.0*   HEMATOCRIT 33.8* 35.9* 41.1 33.6* 30.6*   PLATELETS 171 163 154 156 155        AutoDiff:   Lab Results   Lab 24  1004 24  0551 24  0620   NEUTROPHIL % 77.3* 70.8 60.4   LYMPHOCYTE % 13.4* 15.5* 23.0   MONOCYTES % 7.9 11.0 11.8   EOSINOPHIL % 0.7 1.5 4.0   BASOPHIL % 0.3 0.5 0.4   NEUTROS ABS 7.29* 4.31 3.44   LYMPHS ABS 1.27 0.94 1.31   MONOS ABS 0.75 0.67 0.67   EOS ABS 0.07 0.09 0.23   BASOS ABS 0.03 0.03 0.02        Manual Diff:    Lab Results   Lab 24  1004 24  0551 24  0620   NEUTROS ABS 7.29* 4.31 3.44           CMP:   Lab Results   Lab 24  1326 24  0449 24  1004 24  0551 24  0620   SODIUM 140   < > 140 138 135*   POTASSIUM 3.4*   < > 3.6 3.3* 3.5   CHLORIDE 103   < > 102 103 100   CO2 23.0   < > 22.0 23.0 " 24.0   BUN 29*   < > 36* 40* 51*   CREATININE 1.93*   < > 2.03* 2.07* 2.74*   CALCIUM 9.5   < > 9.2 8.4* 8.2*   BILIRUBIN 0.7  --   --  0.7 0.5   ALK PHOS 91  --   --  81 95   ALT (SGPT) 8  --   --  17 19   AST (SGOT) 13  --   --  32 32   GLUCOSE 438*   < > 79 120* 104*    < > = values in this interval not displayed.       Estimated Creatinine Clearance: 37 mL/min (A) (by C-G formula based on SCr of 2.74 mg/dL (H)).    Culture Results:    Microbiology Results (last 10 days)       Procedure Component Value - Date/Time    Blood Culture With DARSHAN - Blood, Hand, Right [[id number]]  (Normal) Collected: 03/29/24 0843    Lab Status: Preliminary result Specimen: Blood from Hand, Right Updated: 03/29/24 2115     Blood Culture No growth at less than 24 hours    MRSA Screen, PCR (Inpatient) - Swab, Nares [[id number]]  (Abnormal) Collected: 03/28/24 2128    Lab Status: Final result Specimen: Swab from Nares Updated: 03/28/24 2239     MRSA PCR MRSA Detected    Narrative:      The negative predictive value of this diagnostic test is high and should only be used to consider de-escalating anti-MRSA therapy. A positive result may indicate colonization with MRSA and must be correlated clinically.    AMAN AURIS SCREEN - Swab, Axilla Right, Axilla Left and Groin [[id number]]  (Normal) Collected: 03/27/24 0351    Lab Status: Preliminary result Specimen: Swab from Axilla Right, Axilla Left and Groin Updated: 03/30/24 0415     Candida Auris Screen Culture No Candida auris isolated at 3 days    Respiratory Panel PCR w/COVID-19(SARS-CoV-2) MICHAEL/ANKUSH/SEAN/PAD/COR/ROSE MARY In-House, NP Swab in UTM/VTM, 2 HR TAT - Swab, Nasopharynx [[id number]]  (Normal) Collected: 03/26/24 1732    Lab Status: Final result Specimen: Swab from Nasopharynx Updated: 03/26/24 1905     ADENOVIRUS, PCR Not Detected     Coronavirus 229E Not Detected     Coronavirus HKU1 Not Detected     Coronavirus NL63 Not Detected     Coronavirus OC43 Not Detected     COVID19 Not Detected      Human Metapneumovirus Not Detected     Human Rhinovirus/Enterovirus Not Detected     Influenza A PCR Not Detected     Influenza B PCR Not Detected     Parainfluenza Virus 1 Not Detected     Parainfluenza Virus 2 Not Detected     Parainfluenza Virus 3 Not Detected     Parainfluenza Virus 4 Not Detected     RSV, PCR Not Detected     Bordetella pertussis pcr Not Detected     Bordetella parapertussis PCR Not Detected     Chlamydophila pneumoniae PCR Not Detected     Mycoplasma pneumo by PCR Not Detected    Narrative:      In the setting of a positive respiratory panel with a viral infection PLUS a negative procalcitonin without other underlying concern for bacterial infection, consider observing off antibiotics or discontinuation of antibiotics and continue supportive care. If the respiratory panel is positive for atypical bacterial infection (Bordetella pertussis, Chlamydophila pneumoniae, or Mycoplasma pneumoniae), consider antibiotic de-escalation to target atypical bacterial infection.    Blood Culture - Blood, Arm, Right [[id number]]  (Abnormal) Collected: 03/26/24 1346    Lab Status: Final result Specimen: Blood from Arm, Right Updated: 03/29/24 0513     Blood Culture Staphylococcus aureus, MRSA     Comment:   Infectious disease consultation is highly recommended to rule out distant foci of infection.  Methicillin resistant Staphylococcus aureus, Patient may be an isolation risk.        Isolated from Aerobic and Anaerobic Bottles     Gram Stain Aerobic Bottle Gram positive cocci      Anaerobic Bottle Gram positive cocci    Blood Culture - Blood, Arm, Left [[id number]]  (Abnormal)  (Susceptibility) Collected: 03/26/24 1346    Lab Status: Final result Specimen: Blood from Arm, Left Updated: 03/29/24 0513     Blood Culture Staphylococcus aureus, MRSA     Comment:   Infectious disease consultation is highly recommended to rule out distant foci of infection.  Methicillin resistant Staphylococcus aureus, Patient may be  an isolation risk.        Isolated from Aerobic and Anaerobic Bottles     Gram Stain Aerobic Bottle Gram positive cocci      Anaerobic Bottle Gram positive cocci    Susceptibility        Staphylococcus aureus, MRSA      MATT      Gentamicin Susceptible      Oxacillin Resistant      Rifampin Susceptible      Vancomycin Susceptible                       Susceptibility Comments       Staphylococcus aureus, MRSA    This isolate is presumed to be clindamycin resistant based on detection of inducible clindamycin resistance.  Clindamycin may still be effective in some patients.               Blood Culture ID, PCR - Blood, Arm, Left [[id number]]  (Abnormal) Collected: 03/26/24 1346    Lab Status: Final result Specimen: Blood from Arm, Left Updated: 03/27/24 0426     BCID, PCR Staph aureus. mecA/C and MREJ (methicillin resistance gene) detected. Identification by BCID2 PCR.     BOTTLE TYPE Aerobic Bottle    Narrative:      Infectious disease consultation is highly recommended to rule out distant foci of infection.                 Radiology:   Imaging Results (Last 72 Hours)       Procedure Component Value Units Date/Time    MRI Foot Left With & Without Contrast [[id number]] Collected: 03/29/24 1856     Updated: 03/29/24 1901    Narrative:      EXAMINATION: MRI FOOT LEFT W WO CONTRAST-     3/29/2024 4:30 PM     HISTORY: Foot swelling, diabetic, osteomyelitis suspected, xray done;  E11.628-Type 2 diabetes mellitus with other skin complications;  L08.9-Local infection of the skin and subcutaneous tissue, unspecified;  E11.65-Type 2 diabetes mellitus with hyperglycemia; Z74.09-Other reduced  mobility     LEFT foot MRI without and with IV gadolinium contrast.  Axial, sagittal, and coronal sequences.     COMPARISON:  LEFT foot x-rays from 3/26/2024.     There is a tiny metal foreign body within the plantar soft tissues  adjacent to the second toe and despite the small size it produces a  prominent amount of artifact related to image  distortion.     I see no soft tissue abscess.     No discrete bone edema or bone enhancement is seen to indicate  osteomyelitis.       Impression:      1. There are some limitations to the study based on metal related  artifact.  2. No soft tissue abscess or definite bone marrow edema or enhancement  is seen to indicate osteomyelitis.           This report was signed and finalized on 3/29/2024 6:58 PM by Dr. Gomez Mcgill MD.                   Active Hospital Problems    Diagnosis     **Sepsis     Diabetic foot infection     Chronic kidney disease (CKD), stage IV (severe)     Chronic diastolic heart failure     BPH without obstruction/lower urinary tract symptoms     Diabetic polyneuropathy associated with type 2 diabetes mellitus     Anemia due to chronic kidney disease     Essential hypertension     Type 2 diabetes mellitus with hyperglycemia, with long-term current use of insulin        IMPRESSION:  MRSA bacteremia-blood cultures + March 26.  Secondary to infected left foot with wounds and associated cellulitis on admission.  Encephalopathy on admission-secondary to infection.  Resolved  Chronic kidney disease stage IIIb-BUN and creatinine both increased today.  Type 2 diabetes mellitus-poorly controlled with hemoglobin A1c of 10.7 this admission-appears controlled in hospital.  MRSA nasal screen positive      RECOMMENDATION:   Continue linezolid  Wound care per Dr. Cerrato's orders  Continue to monitor surveillance blood cultures from March 29 and March 30.  If has persistent bacteremia will need additional workup.  Glucose management per attending  Continue intranasal Bactroban for MRSA nasal carriage  Hold Bumex given increased BUN and creatinine?    Reviewed Dr. Cerrato's note March 29    Julia Adrian MD  03/30/24  08:35 CDT

## 2024-03-30 NOTE — PLAN OF CARE
"Goal Outcome Evaluation:  Plan of Care Reviewed With: patient        Progress: no change  Outcome Evaluation: Pt c/o abdominal discomfort still, says he \"just doesn't feel good\"; nausea meds given x2 PRN; sat up in chair most of the shift; voiding per urinal; IID; IV abx continue; safety maintained.                               "

## 2024-03-30 NOTE — PROGRESS NOTES
[facility] Medicine Services  INPATIENT PROGRESS NOTE    Patient Name: Erick Luong  Date of Admission: 3/26/2024  Today's Date: 03/30/24  Length of Stay: 4  Primary Care Physician: Del Shetty MD    Subjective   Chief Complaint: Altered mental status    HPI   Patient is alert and oriented x 3.  He appears to be in good spirits and ate all his breakfast.  MRI of the left foot did not show any osteomyelitis or abscess.    Review of Systems   All pertinent negatives and positives are as above. All other systems have been reviewed and are negative unless otherwise stated.     Objective    Temp:  [97.3 °F (36.3 °C)-98.7 °F (37.1 °C)] 97.8 °F (36.6 °C)  Heart Rate:  [74-89] 75  Resp:  [16-18] 16  BP: ()/(51-67) 118/67  Physical Exam  Constitutional:       General: He is not in acute distress.     Appearance: He is well-developed. He is not diaphoretic.   HENT:      Head: Normocephalic.   Eyes:      General: No scleral icterus.     Conjunctiva/sclera: Conjunctivae normal.      Pupils: Pupils are equal, round, and reactive to light.   Neck:      Thyroid: No thyromegaly.      Vascular: No JVD.      Trachea: No tracheal deviation.   Cardiovascular:      Rate and Rhythm: Normal rate and regular rhythm.      Heart sounds: Normal heart sounds. No murmur heard.     No friction rub. No gallop.   Pulmonary:      Effort: Pulmonary effort is normal. No respiratory distress.      Breath sounds: Normal breath sounds. No stridor. No wheezing or rales.   Chest:      Chest wall: No tenderness.   Abdominal:      General: Bowel sounds are normal. There is no distension.      Palpations: Abdomen is soft. There is no mass.      Tenderness: There is no abdominal tenderness. There is no guarding or rebound.      Hernia: No hernia is present.   Musculoskeletal:         General: Swelling and signs of injury present. No tenderness or deformity. Normal range of motion.      Cervical back: Normal  range of motion and neck supple.      Right lower leg: No edema.      Comments: Dressing over left foot noted.   Lymphadenopathy:      Cervical: No cervical adenopathy.   Skin:     General: Skin is warm and dry.      Coloration: Skin is not pale.      Findings: No erythema or rash.   Neurological:      General: No focal deficit present.      Mental Status: He is alert and oriented to person, place, and time.      Cranial Nerves: No cranial nerve deficit.      Sensory: No sensory deficit.      Motor: No abnormal muscle tone.      Coordination: Coordination normal.   Psychiatric:         Mood and Affect: Mood normal.         Behavior: Behavior normal.            File Link    Scan on 3/26/2024 1423 by Ro Vinson RN: Wound 06/15/23 0102 Left anterior plantar        Key Information    Document ID File Type Document Type Description   [id number] Image WOUND IMAGE Wound 06/15/23 0102 Left anterior plantar     Import Information    Attached At Date Time User Dept   Encounter Level 3/26/2024  2:23 PM Ro Vinson RN [facility] Emergency Dept     Encounter    Hospital Encounter on 3/26/24         Results Review:  I have reviewed the labs, radiology results, and diagnostic studies.    Laboratory Data:   Results from last 7 days   Lab Units 03/30/24  0620 03/29/24  0551 03/28/24  1004   WBC 10*3/mm3 5.69 6.08 9.45   HEMOGLOBIN g/dL 10.0* 11.1* 13.7   HEMATOCRIT % 30.6* 33.6* 41.1   PLATELETS 10*3/mm3 155 156 154        Results from last 7 days   Lab Units 03/30/24  0620 03/29/24  0551 03/28/24  1004 03/27/24  0449 03/26/24  1326   SODIUM mmol/L 135* 138 140   < > 140   POTASSIUM mmol/L 3.5 3.3* 3.6   < > 3.4*   CHLORIDE mmol/L 100 103 102   < > 103   CO2 mmol/L 24.0 23.0 22.0   < > 23.0   BUN mg/dL 51* 40* 36*   < > 29*   CREATININE mg/dL 2.74* 2.07* 2.03*   < > 1.93*   CALCIUM mg/dL 8.2* 8.4* 9.2   < > 9.5   BILIRUBIN mg/dL 0.5 0.7  --   --  0.7   ALK PHOS U/L 95 81  --   --  91   ALT (SGPT) U/L 19 17  --   --  8    AST (SGOT) U/L 32 32  --   --  13   GLUCOSE mg/dL 104* 120* 79   < > 438*    < > = values in this interval not displayed.       Culture Data:   Blood Culture   Date Value Ref Range Status   03/26/2024 Abnormal Stain (C)  Preliminary   03/26/2024 Abnormal Stain (C)  Preliminary       Radiology Data:   Imaging Results (Last 24 Hours)       Procedure Component Value Units Date/Time    MRI Foot Left With & Without Contrast [[id number]] Collected: 03/29/24 1856     Updated: 03/29/24 1901    Narrative:      EXAMINATION: MRI FOOT LEFT W WO CONTRAST-     3/29/2024 4:30 PM     HISTORY: Foot swelling, diabetic, osteomyelitis suspected, xray done;  E11.628-Type 2 diabetes mellitus with other skin complications;  L08.9-Local infection of the skin and subcutaneous tissue, unspecified;  E11.65-Type 2 diabetes mellitus with hyperglycemia; Z74.09-Other reduced  mobility     LEFT foot MRI without and with IV gadolinium contrast.  Axial, sagittal, and coronal sequences.     COMPARISON:  LEFT foot x-rays from 3/26/2024.     There is a tiny metal foreign body within the plantar soft tissues  adjacent to the second toe and despite the small size it produces a  prominent amount of artifact related to image distortion.     I see no soft tissue abscess.     No discrete bone edema or bone enhancement is seen to indicate  osteomyelitis.       Impression:      1. There are some limitations to the study based on metal related  artifact.  2. No soft tissue abscess or definite bone marrow edema or enhancement  is seen to indicate osteomyelitis.           This report was signed and finalized on 3/29/2024 6:58 PM by Dr. Gomez Mcgill MD.               I have reviewed the patient's current medications.     Assessment/Plan   Assessment  Active Hospital Problems    Diagnosis     **Sepsis     Diabetic foot infection     Chronic kidney disease (CKD), stage IV (severe)     Chronic diastolic heart failure     BPH without obstruction/lower urinary tract  symptoms     Diabetic polyneuropathy associated with type 2 diabetes mellitus     Anemia due to chronic kidney disease     Essential hypertension     Type 2 diabetes mellitus with hyperglycemia, with long-term current use of insulin    MRSA bacteremia  Metabolic encephalopathy secondary to sepsis  WANDER on CKD    Treatment Plan  Continue IV antibiotics with Zyvox. Infectious disease input appreciated.  Mental status appears back to baseline now that sepsis is better controlled.    Podiatry consultation input appreciated.  Will follow recommendations.  Continue wound dressings as per podiatry Follow-up repeat blood cultures.    Nephrology and urology input appreciated.  Hold Bumex due to elevated creatinine and BUN.  No signs of urinary retention at this time.    Pain control with opiate pain medications.    Insulin sliding scale and Levemir for type 2 diabetes mellitus    DVT prophylaxis with Eliquis    PT/OT/wound care consultations    DVT prophylaxis with Eliquis    CODE STATUS is full code    Medical Decision Making  Number and Complexity of problems: 4 acute, severe, high complexity medical problems.  Multiple chronic medical problems.  Differential Diagnosis: None    Conditions and Status        Condition is worsening.     MDM Data  External documents reviewed: None  Cardiac tracing (EKG, telemetry) interpretation: None  Radiology interpretation: None  Labs reviewed: CBC, BMP, blood cultures reviewed by me  Any tests that were considered but not ordered: None     Decision rules/scores evaluated (example [id number], Wells, etc): N/A     Discussed with: Patient     Care Planning  Shared decision making: Patient and his wife  Code status and discussions: CODE STATUS is full code    Disposition  Social Determinants of Health that impact treatment or disposition: None  I expect the patient to be discharged to skilled nursing facility in 3 to 5 days.     Electronically signed by Rene Maloney MD, 03/30/24, 16:39  CDT.

## 2024-03-30 NOTE — PLAN OF CARE
Goal Outcome Evaluation:  Plan of Care Reviewed With: patient        Progress: no change  Outcome Evaluation: Patient has rested well. Complains pain x1 thus far. Voiding. IV abx infusing per order. VSS. Safety maintained.

## 2024-03-30 NOTE — PLAN OF CARE
Goal Outcome Evaluation:  Plan of Care Reviewed With: patient        Progress: improving  Outcome Evaluation: PT tx completed. Pt c/o no pain BLE's. Donned cam boot to L foot for transfer to chair. Pt requires little assistance with mobility. WBAT per Dr. Cerrato with cam boot on. UP in chair with exit alarm.

## 2024-03-31 LAB
ALBUMIN SERPL-MCNC: 2.9 G/DL (ref 3.5–5.2)
ALBUMIN/GLOB SERPL: 1.1 G/DL
ALP SERPL-CCNC: 110 U/L (ref 39–117)
ALT SERPL W P-5'-P-CCNC: 19 U/L (ref 1–41)
ANION GAP SERPL CALCULATED.3IONS-SCNC: 12 MMOL/L (ref 5–15)
AST SERPL-CCNC: 30 U/L (ref 1–40)
BASOPHILS # BLD AUTO: 0.02 10*3/MM3 (ref 0–0.2)
BASOPHILS NFR BLD AUTO: 0.4 % (ref 0–1.5)
BILIRUB SERPL-MCNC: 0.5 MG/DL (ref 0–1.2)
BUN SERPL-MCNC: 51 MG/DL (ref 8–23)
BUN/CREAT SERPL: 20.2 (ref 7–25)
CALCIUM SPEC-SCNC: 8.6 MG/DL (ref 8.6–10.5)
CHLORIDE SERPL-SCNC: 101 MMOL/L (ref 98–107)
CO2 SERPL-SCNC: 23 MMOL/L (ref 22–29)
CREAT SERPL-MCNC: 2.52 MG/DL (ref 0.76–1.27)
DEPRECATED RDW RBC AUTO: 46.6 FL (ref 37–54)
EGFRCR SERPLBLD CKD-EPI 2021: 27.2 ML/MIN/1.73
EOSINOPHIL # BLD AUTO: 0.21 10*3/MM3 (ref 0–0.4)
EOSINOPHIL NFR BLD AUTO: 4.2 % (ref 0.3–6.2)
ERYTHROCYTE [DISTWIDTH] IN BLOOD BY AUTOMATED COUNT: 14.5 % (ref 12.3–15.4)
GLOBULIN UR ELPH-MCNC: 2.6 GM/DL
GLUCOSE BLDC GLUCOMTR-MCNC: 169 MG/DL (ref 70–130)
GLUCOSE BLDC GLUCOMTR-MCNC: 204 MG/DL (ref 70–130)
GLUCOSE BLDC GLUCOMTR-MCNC: 284 MG/DL (ref 70–130)
GLUCOSE BLDC GLUCOMTR-MCNC: 81 MG/DL (ref 70–130)
GLUCOSE SERPL-MCNC: 201 MG/DL (ref 65–99)
HCT VFR BLD AUTO: 31.6 % (ref 37.5–51)
HGB BLD-MCNC: 10.3 G/DL (ref 13–17.7)
IMM GRANULOCYTES # BLD AUTO: 0.02 10*3/MM3 (ref 0–0.05)
IMM GRANULOCYTES NFR BLD AUTO: 0.4 % (ref 0–0.5)
LYMPHOCYTES # BLD AUTO: 1.45 10*3/MM3 (ref 0.7–3.1)
LYMPHOCYTES NFR BLD AUTO: 28.9 % (ref 19.6–45.3)
MCH RBC QN AUTO: 28.3 PG (ref 26.6–33)
MCHC RBC AUTO-ENTMCNC: 32.6 G/DL (ref 31.5–35.7)
MCV RBC AUTO: 86.8 FL (ref 79–97)
MONOCYTES # BLD AUTO: 0.54 10*3/MM3 (ref 0.1–0.9)
MONOCYTES NFR BLD AUTO: 10.8 % (ref 5–12)
NEUTROPHILS NFR BLD AUTO: 2.77 10*3/MM3 (ref 1.7–7)
NEUTROPHILS NFR BLD AUTO: 55.3 % (ref 42.7–76)
NRBC BLD AUTO-RTO: 0 /100 WBC (ref 0–0.2)
PLATELET # BLD AUTO: 149 10*3/MM3 (ref 140–450)
PMV BLD AUTO: 12.6 FL (ref 6–12)
POTASSIUM SERPL-SCNC: 3.6 MMOL/L (ref 3.5–5.2)
PROT SERPL-MCNC: 5.5 G/DL (ref 6–8.5)
RBC # BLD AUTO: 3.64 10*6/MM3 (ref 4.14–5.8)
SODIUM SERPL-SCNC: 136 MMOL/L (ref 136–145)
WBC NRBC COR # BLD AUTO: 5.01 10*3/MM3 (ref 3.4–10.8)

## 2024-03-31 PROCEDURE — 99232 SBSQ HOSP IP/OBS MODERATE 35: CPT | Performed by: INTERNAL MEDICINE

## 2024-03-31 PROCEDURE — 85025 COMPLETE CBC W/AUTO DIFF WBC: CPT | Performed by: INTERNAL MEDICINE

## 2024-03-31 PROCEDURE — 63710000001 INSULIN LISPRO (HUMAN) PER 5 UNITS: Performed by: NURSE PRACTITIONER

## 2024-03-31 PROCEDURE — 25010000002 MORPHINE PER 10 MG: Performed by: INTERNAL MEDICINE

## 2024-03-31 PROCEDURE — 25010000002 LINEZOLID 600 MG/300ML SOLUTION: Performed by: INTERNAL MEDICINE

## 2024-03-31 PROCEDURE — 80053 COMPREHEN METABOLIC PANEL: CPT | Performed by: INTERNAL MEDICINE

## 2024-03-31 PROCEDURE — 82948 REAGENT STRIP/BLOOD GLUCOSE: CPT

## 2024-03-31 PROCEDURE — 63710000001 INSULIN DETEMIR PER 5 UNITS: Performed by: NURSE PRACTITIONER

## 2024-03-31 PROCEDURE — 63710000001 INSULIN REGULAR HUMAN PER 5 UNITS: Performed by: EMERGENCY MEDICINE

## 2024-03-31 RX ORDER — MORPHINE SULFATE 2 MG/ML
2 INJECTION, SOLUTION INTRAMUSCULAR; INTRAVENOUS
Status: DISPENSED | OUTPATIENT
Start: 2024-03-31 | End: 2024-04-07

## 2024-03-31 RX ORDER — DICYCLOMINE HYDROCHLORIDE 10 MG/1
20 CAPSULE ORAL 3 TIMES DAILY PRN
Status: DISCONTINUED | OUTPATIENT
Start: 2024-03-31 | End: 2024-04-11 | Stop reason: HOSPADM

## 2024-03-31 RX ADMIN — LINEZOLID 600 MG: 600 INJECTION, SOLUTION INTRAVENOUS at 23:31

## 2024-03-31 RX ADMIN — TIMOLOL MALEATE 1 DROP: 2.5 SOLUTION/ DROPS OPHTHALMIC at 08:35

## 2024-03-31 RX ADMIN — FAMOTIDINE 20 MG: 20 TABLET, FILM COATED ORAL at 08:33

## 2024-03-31 RX ADMIN — Medication 5000 UNITS: at 08:32

## 2024-03-31 RX ADMIN — SUCRALFATE 1 G: 1 SUSPENSION ORAL at 08:31

## 2024-03-31 RX ADMIN — Medication 10 ML: at 08:35

## 2024-03-31 RX ADMIN — SUCRALFATE 1 G: 1 SUSPENSION ORAL at 12:07

## 2024-03-31 RX ADMIN — MIDODRINE HYDROCHLORIDE 2.5 MG: 2.5 TABLET ORAL at 18:07

## 2024-03-31 RX ADMIN — INSULIN HUMAN 10 UNITS: 100 INJECTION, SOLUTION PARENTERAL at 18:07

## 2024-03-31 RX ADMIN — Medication 1 APPLICATION: at 20:23

## 2024-03-31 RX ADMIN — Medication 1 APPLICATION: at 08:32

## 2024-03-31 RX ADMIN — LINEZOLID 600 MG: 600 INJECTION, SOLUTION INTRAVENOUS at 12:07

## 2024-03-31 RX ADMIN — INSULIN LISPRO 4 UNITS: 100 INJECTION, SOLUTION INTRAVENOUS; SUBCUTANEOUS at 12:02

## 2024-03-31 RX ADMIN — Medication 10 ML: at 20:25

## 2024-03-31 RX ADMIN — OXYCODONE HYDROCHLORIDE 10 MG: 5 TABLET ORAL at 20:36

## 2024-03-31 RX ADMIN — PREGABALIN 50 MG: 50 CAPSULE ORAL at 08:34

## 2024-03-31 RX ADMIN — MIDODRINE HYDROCHLORIDE 2.5 MG: 2.5 TABLET ORAL at 08:32

## 2024-03-31 RX ADMIN — MAGNESIUM HYDROXIDE 10 ML: 2400 SUSPENSION ORAL at 12:02

## 2024-03-31 RX ADMIN — INSULIN HUMAN 10 UNITS: 100 INJECTION, SOLUTION PARENTERAL at 12:02

## 2024-03-31 RX ADMIN — APIXABAN 5 MG: 5 TABLET, FILM COATED ORAL at 08:33

## 2024-03-31 RX ADMIN — INSULIN LISPRO 8 UNITS: 100 INJECTION, SOLUTION INTRAVENOUS; SUBCUTANEOUS at 08:30

## 2024-03-31 RX ADMIN — ZINC SULFATE 220 MG (50 MG) CAPSULE 220 MG: CAPSULE at 08:31

## 2024-03-31 RX ADMIN — INSULIN DETEMIR 30 UNITS: 100 INJECTION, SOLUTION SUBCUTANEOUS at 20:23

## 2024-03-31 RX ADMIN — INSULIN LISPRO 6 UNITS: 100 INJECTION, SOLUTION INTRAVENOUS; SUBCUTANEOUS at 20:36

## 2024-03-31 RX ADMIN — APIXABAN 5 MG: 5 TABLET, FILM COATED ORAL at 20:24

## 2024-03-31 RX ADMIN — OXYCODONE HYDROCHLORIDE AND ACETAMINOPHEN 1000 MG: 500 TABLET ORAL at 08:32

## 2024-03-31 RX ADMIN — CALCITRIOL 0.5 MCG: 0.25 CAPSULE ORAL at 08:33

## 2024-03-31 RX ADMIN — ACETAMINOPHEN 650 MG: 325 TABLET, FILM COATED ORAL at 14:16

## 2024-03-31 RX ADMIN — INSULIN HUMAN 10 UNITS: 100 INJECTION, SOLUTION PARENTERAL at 08:30

## 2024-03-31 RX ADMIN — TIMOLOL MALEATE 1 DROP: 2.5 SOLUTION/ DROPS OPHTHALMIC at 20:23

## 2024-03-31 RX ADMIN — DICYCLOMINE HYDROCHLORIDE 20 MG: 10 CAPSULE ORAL at 16:03

## 2024-03-31 RX ADMIN — TAMSULOSIN HYDROCHLORIDE 0.4 MG: 0.4 CAPSULE ORAL at 08:34

## 2024-03-31 RX ADMIN — OXYCODONE HYDROCHLORIDE 10 MG: 5 TABLET ORAL at 05:58

## 2024-03-31 RX ADMIN — DONEPEZIL HYDROCHLORIDE 10 MG: 10 TABLET, FILM COATED ORAL at 08:31

## 2024-03-31 RX ADMIN — SUCRALFATE 1 G: 1 SUSPENSION ORAL at 18:07

## 2024-03-31 RX ADMIN — ASPIRIN 81 MG: 81 TABLET, COATED ORAL at 08:33

## 2024-03-31 RX ADMIN — MORPHINE SULFATE 2 MG: 2 INJECTION, SOLUTION INTRAMUSCULAR; INTRAVENOUS at 15:01

## 2024-03-31 RX ADMIN — PREGABALIN 100 MG: 100 CAPSULE ORAL at 20:23

## 2024-03-31 RX ADMIN — Medication 6 MG: at 20:24

## 2024-03-31 RX ADMIN — MIDODRINE HYDROCHLORIDE 2.5 MG: 2.5 TABLET ORAL at 12:02

## 2024-03-31 RX ADMIN — ISOSORBIDE MONONITRATE 30 MG: 30 TABLET, EXTENDED RELEASE ORAL at 08:33

## 2024-03-31 RX ADMIN — ROSUVASTATIN CALCIUM 10 MG: 10 TABLET, FILM COATED ORAL at 20:24

## 2024-03-31 NOTE — PLAN OF CARE
Goal Outcome Evaluation:  Plan of Care Reviewed With: patient        Progress: no change  Outcome Evaluation: Pt has still c/o not feeling well today and c/o stomach pain, medications added for PRN pain and PRN cramps; drsg changed per order; up with assist; voiding; IID except abx; safety maintained.

## 2024-03-31 NOTE — PLAN OF CARE
Goal Outcome Evaluation:  Plan of Care Reviewed With: patient        Progress: no change  Outcome Evaluation: Patient rested well. No complaints of pain thus far. IV abx infusing per order, otherwise IID. Voiding. VSS. Safety maintained.

## 2024-03-31 NOTE — PROGRESS NOTES
[facility] Medicine Services  INPATIENT PROGRESS NOTE    Patient Name: Erick Luong  Date of Admission: 3/26/2024  Today's Date: 03/31/24  Length of Stay: 5  Primary Care Physician: Del Shetty MD    Subjective   Chief Complaint: Altered mental status    HPI   Patient is alert and oriented x 3.  He appears to be in good spirits.  He is requesting for discharge.  He was counseled that we will need to wait until later in this week, after we set up IV antibiotics before we can discharge him.      Review of Systems   All pertinent negatives and positives are as above. All other systems have been reviewed and are negative unless otherwise stated.     Objective    Temp:  [97.6 °F (36.4 °C)-98.4 °F (36.9 °C)] 98.4 °F (36.9 °C)  Heart Rate:  [63-81] 67  Resp:  [16] 16  BP: (111-120)/(50-67) 114/50  Physical Exam  Constitutional:       General: He is not in acute distress.     Appearance: He is well-developed. He is not diaphoretic.   HENT:      Head: Normocephalic.   Eyes:      General: No scleral icterus.     Conjunctiva/sclera: Conjunctivae normal.      Pupils: Pupils are equal, round, and reactive to light.   Neck:      Thyroid: No thyromegaly.      Vascular: No JVD.      Trachea: No tracheal deviation.   Cardiovascular:      Rate and Rhythm: Normal rate and regular rhythm.      Heart sounds: Normal heart sounds. No murmur heard.     No friction rub. No gallop.   Pulmonary:      Effort: Pulmonary effort is normal. No respiratory distress.      Breath sounds: Normal breath sounds. No stridor. No wheezing or rales.   Chest:      Chest wall: No tenderness.   Abdominal:      General: Bowel sounds are normal. There is no distension.      Palpations: Abdomen is soft. There is no mass.      Tenderness: There is no abdominal tenderness. There is no guarding or rebound.      Hernia: No hernia is present.   Musculoskeletal:         General: Swelling and signs of injury present. No  tenderness or deformity. Normal range of motion.      Cervical back: Normal range of motion and neck supple.      Right lower leg: No edema.      Comments: Dressing over left foot noted.   Lymphadenopathy:      Cervical: No cervical adenopathy.   Skin:     General: Skin is warm and dry.      Coloration: Skin is not pale.      Findings: No erythema or rash.   Neurological:      General: No focal deficit present.      Mental Status: He is alert and oriented to person, place, and time.      Cranial Nerves: No cranial nerve deficit.      Sensory: No sensory deficit.      Motor: No abnormal muscle tone.      Coordination: Coordination normal.   Psychiatric:         Mood and Affect: Mood normal.         Behavior: Behavior normal.            File Link    Scan on 3/26/2024 1423 by Ro Vinson RN: Wound 06/15/23 0102 Left anterior plantar        Key Information    Document ID File Type Document Type Description   [id number] Image WOUND IMAGE Wound 06/15/23 0102 Left anterior plantar     Import Information    Attached At Date Time User Dept   Encounter Level 3/26/2024  2:23 PM Ro Vinson RN [facility] Emergency Dept     Encounter    Hospital Encounter on 3/26/24     Results Review:  I have reviewed the labs, radiology results, and diagnostic studies.    Laboratory Data:   Results from last 7 days   Lab Units 03/31/24  0526 03/30/24  0620 03/29/24  0551   WBC 10*3/mm3 5.01 5.69 6.08   HEMOGLOBIN g/dL 10.3* 10.0* 11.1*   HEMATOCRIT % 31.6* 30.6* 33.6*   PLATELETS 10*3/mm3 149 155 156        Results from last 7 days   Lab Units 03/31/24  0526 03/30/24  0620 03/29/24  0551   SODIUM mmol/L 136 135* 138   POTASSIUM mmol/L 3.6 3.5 3.3*   CHLORIDE mmol/L 101 100 103   CO2 mmol/L 23.0 24.0 23.0   BUN mg/dL 51* 51* 40*   CREATININE mg/dL 2.52* 2.74* 2.07*   CALCIUM mg/dL 8.6 8.2* 8.4*   BILIRUBIN mg/dL 0.5 0.5 0.7   ALK PHOS U/L 110 95 81   ALT (SGPT) U/L 19 19 17   AST (SGOT) U/L 30 32 32   GLUCOSE mg/dL 201* 104* 120*        Culture Data:   Blood Culture   Date Value Ref Range Status   03/26/2024 Abnormal Stain (C)  Preliminary   03/26/2024 Abnormal Stain (C)  Preliminary       Radiology Data:   Imaging Results (Last 24 Hours)       ** No results found for the last 24 hours. **            I have reviewed the patient's current medications.     Assessment/Plan   Assessment  Active Hospital Problems    Diagnosis     **Sepsis     Diabetic foot infection     Chronic kidney disease (CKD), stage IV (severe)     Chronic diastolic heart failure     BPH without obstruction/lower urinary tract symptoms     Diabetic polyneuropathy associated with type 2 diabetes mellitus     Anemia due to chronic kidney disease     Essential hypertension     Type 2 diabetes mellitus with hyperglycemia, with long-term current use of insulin    MRSA bacteremia  Metabolic encephalopathy secondary to sepsis  WANDER on CKD    Treatment Plan  Continue IV antibiotics with Zyvox. Infectious disease input appreciated.  Mental status appears back to baseline now that sepsis is better controlled.  However he has persistently positive blood cultures.  Plan for 2D echo on Monday.  Will hold off on inserting PICC line for now until blood cultures are clear.    Podiatry consultation input appreciated.  Will follow recommendations.  Continue wound dressings as per podiatry Follow-up repeat blood cultures.    Nephrology and urology input appreciated.  Hold Bumex due to elevated creatinine and BUN.  No signs of urinary retention at this time.    Pain control with opiate pain medications.    Insulin sliding scale and Levemir for type 2 diabetes mellitus    DVT prophylaxis with Eliquis    PT/OT/wound care consultations    DVT prophylaxis with Eliquis    CODE STATUS is full code    Medical Decision Making  Number and Complexity of problems: 4 acute, severe, high complexity medical problems.  Multiple chronic medical problems.  Differential Diagnosis: None    Conditions and Status         Condition is worsening.     [facility] Data  External documents reviewed: None  Cardiac tracing (EKG, telemetry) interpretation: None  Radiology interpretation: None  Labs reviewed: CBC, BMP, blood cultures reviewed by me  Any tests that were considered but not ordered: None     Decision rules/scores evaluated (example [id number], Wells, etc): N/A     Discussed with: Patient     Care Planning  Shared decision making: Patient and his wife  Code status and discussions: CODE STATUS is full code    Disposition  Social Determinants of Health that impact treatment or disposition: None  I expect the patient to be discharged to home in 3 to 5 days.     Electronically signed by Rene Maloney MD, 03/31/24, 13:48 CDT.

## 2024-03-31 NOTE — PROGRESS NOTES
"INFECTIOUS DISEASES PROGRESS NOTE    Patient:  Erick Luong  YOB: 1955  MRN: [id number]   Admit date: 3/26/2024   Admitting Physician: Rene Maloney MD  Primary Care Physician: Del Shetty MD    Chief Complaint: Getting up to bedside toilet      Interval History: Melisa at bedside with patient to get him up to bedside toilet.  He indicates he is able to stand and pivot with his right foot and not bear weight on his left foot.    Allergies:   Allergies   Allergen Reactions    Cefepime Hives and Anaphylaxis    Bactrim [Sulfamethoxazole-Trimethoprim] Other (See Comments)     \"RENAL FAILURE\"    Vancomycin Itching    Zolpidem Unknown - High Severity     \"makes him crazy\"    Zolpidem Tartrate Unknown - Low Severity and Provider Review Needed    Metronidazole Rash       Current Scheduled Medications:   apixaban, 5 mg, Oral, Q12H  ascorbic acid, 1,000 mg, Oral, Daily  aspirin, 81 mg, Oral, Daily  calcitriol, 0.5 mcg, Oral, Daily  carvedilol, 3.125 mg, Oral, BID With Meals  donepezil, 10 mg, Oral, Daily  famotidine, 20 mg, Oral, Daily  insulin detemir, 30 Units, Subcutaneous, Nightly  insulin lispro, 4-24 Units, Subcutaneous, 4x Daily AC & at Bedtime  insulin regular, 10 Units, Subcutaneous, TID AC  isosorbide mononitrate, 30 mg, Oral, Q24H  Linezolid, 600 mg, Intravenous, Q12H  melatonin, 6 mg, Oral, Nightly  midodrine, 2.5 mg, Oral, TID AC  mupirocin, 1 Application, Each Nare, BID  pregabalin, 100 mg, Oral, Nightly  pregabalin, 50 mg, Oral, Daily  rosuvastatin, 10 mg, Oral, Nightly  senna-docusate sodium, 1 tablet, Oral, BID  sodium chloride, 10 mL, Intravenous, Q12H  sucralfate, 1 g, Oral, TID AC  tamsulosin, 0.4 mg, Oral, Daily  timolol, 1 drop, Both Eyes, Q12H  vitamin D3, 5,000 Units, Oral, Daily  zinc sulfate, 220 mg, Oral, Daily      Current PRN Medications:    acetaminophen **OR** acetaminophen **OR** acetaminophen    senna-docusate sodium **AND** polyethylene glycol **AND** bisacodyl " "**AND** bisacodyl    dextrose    dextrose    Diclofenac Sodium    glucagon (human recombinant)    haloperidol lactate    LORazepam    magnesium hydroxide    nitroglycerin    ondansetron ODT **OR** ondansetron    oxyCODONE    sodium chloride    sodium chloride    sodium chloride            Objective     Vital Signs:  Temp (24hrs), Av.7 °F (36.5 °C), Min:97.6 °F (36.4 °C), Max:97.8 °F (36.6 °C)      /57 (BP Location: Right arm, Patient Position: Lying)   Pulse 64   Temp 97.6 °F (36.4 °C) (Axillary)   Resp 16   Ht 182.9 cm (72\")   Wt 134 kg (295 lb 6.7 oz)   SpO2 99%   BMI 40.07 kg/m²         Physical Exam:  General: Patient was alert sitting up at bedside.  He was in no acute distress  Left foot dressing clean dry and intact.  Neuro: Speech clear, alert, able to assist getting to bedside toilet                        Results Review:    I reviewed the patient's new clinical results.    Lab Results:    CBC:   Lab Results   Lab 24  1326 24  0449 24  1004 24  0551 24  0620 24  0526   WBC 14.69* 12.75* 9.45 6.08 5.69 5.01   HEMOGLOBIN 11.3* 11.8* 13.7 11.1* 10.0* 10.3*   HEMATOCRIT 33.8* 35.9* 41.1 33.6* 30.6* 31.6*   PLATELETS 171 163 154 156 155 149        AutoDiff:   Lab Results   Lab 24  0551 24  0620 24  0526   NEUTROPHIL % 70.8 60.4 55.3   LYMPHOCYTE % 15.5* 23.0 28.9   MONOCYTES % 11.0 11.8 10.8   EOSINOPHIL % 1.5 4.0 4.2   BASOPHIL % 0.5 0.4 0.4   NEUTROS ABS 4.31 3.44 2.77   LYMPHS ABS 0.94 1.31 1.45   MONOS ABS 0.67 0.67 0.54   EOS ABS 0.09 0.23 0.21   BASOS ABS 0.03 0.02 0.02        Manual Diff:    Lab Results   Lab 24  0551 24  0620 24  0526   NEUTROS ABS 4.31 3.44 2.77           CMP:   Lab Results   Lab 24  0551 24  0620 24  0526   SODIUM 138 135* 136   POTASSIUM 3.3* 3.5 3.6   CHLORIDE 103 100 101   CO2 23.0 24.0 23.0   BUN 40* 51* 51*   CREATININE 2.07* 2.74* 2.52*   CALCIUM 8.4* 8.2* 8.6   BILIRUBIN " 0.7 0.5 0.5   ALK PHOS 81 95 110   ALT (SGPT) 17 19 19   AST (SGOT) 32 32 30   GLUCOSE 120* 104* 201*       Estimated Creatinine Clearance: 40.2 mL/min (A) (by C-G formula based on SCr of 2.52 mg/dL (H)).    Culture Results:    Microbiology Results (last 10 days)       Procedure Component Value - Date/Time    Blood Culture With DARSHAN - Blood, Arm, Left [[id number]]  (Normal) Collected: 03/30/24 0620    Lab Status: Preliminary result Specimen: Blood from Arm, Left Updated: 03/31/24 0645     Blood Culture No growth at 24 hours    Blood Culture With DARSHAN - Blood, Hand, Right [[id number]]  (Abnormal) Collected: 03/29/24 0843    Lab Status: Preliminary result Specimen: Blood from Hand, Right Updated: 03/30/24 1904     Blood Culture Abnormal Stain     Gram Stain Aerobic Bottle Gram positive cocci    Blood Culture ID, PCR - Blood, Hand, Right [[id number]]  (Abnormal) Collected: 03/29/24 0843    Lab Status: Final result Specimen: Blood from Hand, Right Updated: 03/30/24 2028     BCID, PCR Staph aureus. mecA/C and MREJ (methicillin resistance gene) detected. Identification by BCID2 PCR.     BOTTLE TYPE Aerobic Bottle    Narrative:      Infectious disease consultation is highly recommended to rule out distant foci of infection.    MRSA Screen, PCR (Inpatient) - Swab, Nares [[id number]]  (Abnormal) Collected: 03/28/24 2128    Lab Status: Final result Specimen: Swab from Nares Updated: 03/28/24 2239     MRSA PCR MRSA Detected    Narrative:      The negative predictive value of this diagnostic test is high and should only be used to consider de-escalating anti-MRSA therapy. A positive result may indicate colonization with MRSA and must be correlated clinically.    AMAN AURIS SCREEN - Swab, Axilla Right, Axilla Left and Groin [[id number]]  (Normal) Collected: 03/27/24 0351    Lab Status: Preliminary result Specimen: Swab from Axilla Right, Axilla Left and Groin Updated: 03/31/24 0415     Candida Auris Screen Culture No Candida auris  isolated at 4 days    Respiratory Panel PCR w/COVID-19(SARS-CoV-2) [facility] In-House, NP Swab in UTM/VTM, 2 HR TAT - Swab, Nasopharynx [[id number]]  (Normal) Collected: 03/26/24 1732    Lab Status: Final result Specimen: Swab from Nasopharynx Updated: 03/26/24 1905     ADENOVIRUS, PCR Not Detected     Coronavirus 229E Not Detected     Coronavirus HKU1 Not Detected     Coronavirus NL63 Not Detected     Coronavirus OC43 Not Detected     COVID19 Not Detected     Human Metapneumovirus Not Detected     Human Rhinovirus/Enterovirus Not Detected     Influenza A PCR Not Detected     Influenza B PCR Not Detected     Parainfluenza Virus 1 Not Detected     Parainfluenza Virus 2 Not Detected     Parainfluenza Virus 3 Not Detected     Parainfluenza Virus 4 Not Detected     RSV, PCR Not Detected     Bordetella pertussis pcr Not Detected     Bordetella parapertussis PCR Not Detected     Chlamydophila pneumoniae PCR Not Detected     Mycoplasma pneumo by PCR Not Detected    Narrative:      In the setting of a positive respiratory panel with a viral infection PLUS a negative procalcitonin without other underlying concern for bacterial infection, consider observing off antibiotics or discontinuation of antibiotics and continue supportive care. If the respiratory panel is positive for atypical bacterial infection (Bordetella pertussis, Chlamydophila pneumoniae, or Mycoplasma pneumoniae), consider antibiotic de-escalation to target atypical bacterial infection.    Blood Culture - Blood, Arm, Right [[id number]]  (Abnormal) Collected: 03/26/24 1346    Lab Status: Final result Specimen: Blood from Arm, Right Updated: 03/29/24 0513     Blood Culture Staphylococcus aureus, MRSA     Comment:   Infectious disease consultation is highly recommended to rule out distant foci of infection.  Methicillin resistant Staphylococcus aureus, Patient may be an isolation risk.        Isolated from Aerobic and Anaerobic Bottles     Gram Stain  Aerobic Bottle Gram positive cocci      Anaerobic Bottle Gram positive cocci    Blood Culture - Blood, Arm, Left [[id number]]  (Abnormal)  (Susceptibility) Collected: 03/26/24 1346    Lab Status: Final result Specimen: Blood from Arm, Left Updated: 03/29/24 0513     Blood Culture Staphylococcus aureus, MRSA     Comment:   Infectious disease consultation is highly recommended to rule out distant foci of infection.  Methicillin resistant Staphylococcus aureus, Patient may be an isolation risk.        Isolated from Aerobic and Anaerobic Bottles     Gram Stain Aerobic Bottle Gram positive cocci      Anaerobic Bottle Gram positive cocci    Susceptibility        Staphylococcus aureus, MRSA      MATT      Gentamicin Susceptible      Oxacillin Resistant      Rifampin Susceptible      Vancomycin Susceptible                       Susceptibility Comments       Staphylococcus aureus, MRSA    This isolate is presumed to be clindamycin resistant based on detection of inducible clindamycin resistance.  Clindamycin may still be effective in some patients.               Blood Culture ID, PCR - Blood, Arm, Left [[id number]]  (Abnormal) Collected: 03/26/24 1346    Lab Status: Final result Specimen: Blood from Arm, Left Updated: 03/27/24 0426     BCID, PCR Staph aureus. mecA/C and MREJ (methicillin resistance gene) detected. Identification by BCID2 PCR.     BOTTLE TYPE Aerobic Bottle    Narrative:      Infectious disease consultation is highly recommended to rule out distant foci of infection.                 Radiology:   Imaging Results (Last 72 Hours)       Procedure Component Value Units Date/Time    MRI Foot Left With & Without Contrast [[id number]] Collected: 03/29/24 1856     Updated: 03/29/24 1901    Narrative:      EXAMINATION: MRI FOOT LEFT W WO CONTRAST-     3/29/2024 4:30 PM     HISTORY: Foot swelling, diabetic, osteomyelitis suspected, xray done;  E11.628-Type 2 diabetes mellitus with other skin complications;  L08.9-Local  infection of the skin and subcutaneous tissue, unspecified;  E11.65-Type 2 diabetes mellitus with hyperglycemia; Z74.09-Other reduced  mobility     LEFT foot MRI without and with IV gadolinium contrast.  Axial, sagittal, and coronal sequences.     COMPARISON:  LEFT foot x-rays from 3/26/2024.     There is a tiny metal foreign body within the plantar soft tissues  adjacent to the second toe and despite the small size it produces a  prominent amount of artifact related to image distortion.     I see no soft tissue abscess.     No discrete bone edema or bone enhancement is seen to indicate  osteomyelitis.       Impression:      1. There are some limitations to the study based on metal related  artifact.  2. No soft tissue abscess or definite bone marrow edema or enhancement  is seen to indicate osteomyelitis.           This report was signed and finalized on 3/29/2024 6:58 PM by Dr. Gomez Mcgill MD.                   Active Hospital Problems    Diagnosis     **Sepsis     Diabetic foot infection     Chronic kidney disease (CKD), stage IV (severe)     Chronic diastolic heart failure     BPH without obstruction/lower urinary tract symptoms     Diabetic polyneuropathy associated with type 2 diabetes mellitus     Anemia due to chronic kidney disease     Essential hypertension     Type 2 diabetes mellitus with hyperglycemia, with long-term current use of insulin        IMPRESSION:  MRSA bacteremia-blood cultures + March 26 and March 29.  Secondary to infected left foot with wounds and associated cellulitis on admission.  Chronic kidney disease stage IIIb-BUN and creatinine both increased yesterday.  Bumex held.  Creatinine trending down today  Type 2 diabetes mellitus-poorly controlled with hemoglobin A1c of 10.7 this admission-  MRSA nasal screen positive      RECOMMENDATION:   Continue linezolid-extended duration as patient had positive blood culture from March 29  Order surveillance culture for morning of April 1  Wound  care per Dr. Cerrato's orders  Continue to monitor surveillance blood cultures from  March 30.  Given persistent bacteremia, will order 2D echo for Monday  Glucose management per attending          Julia Adrian MD  03/31/24  09:43 CDT

## 2024-03-31 NOTE — PROGRESS NOTES
Nephrology ([facility]) Progress Note      Patient:  Erick Luong  YOB: 1955  Date of Service: 3/31/2024  MRN: [id number]   Acct: [id number]   Primary Care Physician: Del Shetty MD  Advance Directive:   Code Status and Medical Interventions:   Ordered at: 03/26/24 1815     Level Of Support Discussed With:    Health Care Surrogate     Code Status (Patient has no pulse and is not breathing):    CPR (Attempt to Resuscitate)     Medical Interventions (Patient has pulse or is breathing):    Full Support     Admit Date: 3/26/2024       Hospital Day: 5  Referring Provider: No ref. provider found      Patient personally seen and examined.  Complete chart including Consults, Notes, Operative Reports, Labs, Cardiology, and Radiology studies reviewed as able.    Chief complaint: Abnormal labs.    Subjective:  Erick Luong is a 68 y.o. male for whom we were consulted for evaluation and treatment of acute kidney injury.  He has stage IIIb chronic kidney disease baseline, follows with Dr Lomas in our office.  Baseline creatinine approximately 1.8. He has history of insulin-dependent type 2 diabetes, hypertension, abdominal obesity, obstructive sleep apnea, diabetic foot ulcer and coronary artery disease.   Patient presented to ER on 3/26 with altered mental status. Had debridement of ongoing wound on left foot the day prior. Left foot warm and erythremic on arrival to ER. Noted to have elevated blood glucose over 400. Initial creatinine of 1.9.  Admitted to medical floor for sepsis due to left foot wound. Patient has been agitated and confused during the admission, requiring wrist restraints due to pulling at lines and tubes.     This afternoon patient is more alert and awake.  He denies any shortness of breath or dyspnea on exertion.  However he is still very weak    Allergies:  Cefepime, Bactrim [sulfamethoxazole-trimethoprim], Vancomycin, Zolpidem, Zolpidem tartrate, and  Metronidazole    Home Meds:  Medications Prior to Admission   Medication Sig Dispense Refill Last Dose    amitriptyline (ELAVIL) 25 MG tablet Take 2 tablets by mouth Daily at bedtime. (Patient not taking: Reported on 3/27/2024) 60 tablet 1 Not Taking    apixaban (ELIQUIS) 5 MG tablet tablet Take 1 tablet by mouth Every 12 (Twelve) Hours.       ascorbic acid (VITAMIN C) 1000 MG tablet Take 1 tablet by mouth Daily. 30 tablet 3     Aspirin 81 MG capsule Take 81 mg by mouth Daily.       bumetanide (BUMEX) 1 MG tablet Take 1 tablet every day by oral route for 30 days. 30 tablet 0     bumetanide (BUMEX) 2 MG tablet Take 1 tablet by mouth Daily. Take 2 tablets in morning;1 tablet at night (Patient not taking: Reported on 3/27/2024)   Not Taking    busPIRone (BUSPAR) 10 MG tablet Take 1 tablet by mouth 3 (Three) Times a Day.       calcitriol (ROCALTROL) 0.5 MCG capsule Take 1 capsule by mouth Daily. 90 capsule 4     calcitriol (ROCALTROL) 0.5 MCG capsule Take 1 capsule every day by oral route for 90 days. (Patient not taking: Reported on 3/27/2024) 90 capsule 4 Not Taking    carvedilol (COREG) 3.125 MG tablet Take 1 tablet twice a day by oral route. 60 tablet 4     carvedilol (COREG) 6.25 MG tablet Take 1 tablet by mouth 2 (Two) Times a Day. (Patient not taking: Reported on 3/27/2024) 180 tablet 4 Not Taking    Cholecalciferol (D-5000) 125 MCG (5000 UT) tablet Take 1 tablet by mouth Daily Before Lunch. 30 tablet 2     cloNIDine (CATAPRES) 0.1 MG tablet Take 1 tablet by mouth Every 12 (Twelve) Hours. (Patient not taking: Reported on 3/27/2024) 60 tablet 2 Not Taking    Diclofenac Sodium (VOLTAREN) 1 % gel gel Apply 2 g topically to the appropriate area as directed 4 (Four) Times a Day As Needed. 300 g 11     Diclofenac Sodium (VOLTAREN) 1 % gel gel Apply 2 g topically to the appropriate area as directed 4 (Four) Times a Day. (Patient not taking: Reported on 3/27/2024) 300 g 11 Not Taking    donepezil (ARICEPT) 10 MG tablet  Take 1 tablet by mouth Daily. (Patient not taking: Reported on 3/27/2024) 90 tablet 4 Not Taking    Dulaglutide (Trulicity) 4.5 MG/0.5ML solution pen-injector Inject 0.5 mL under the skin into the appropriate area as directed 1 (One) Time Per Week. (Patient not taking: Reported on 3/27/2024) 2 mL 11 Not Taking    DULoxetine (CYMBALTA) 60 MG capsule Take 1 capsule by mouth Daily. 90 capsule 4     DULoxetine (CYMBALTA) 60 MG capsule Take 1 capsule by mouth Daily. (Patient not taking: Reported on 3/27/2024) 90 capsule 4 Not Taking    DULoxetine (CYMBALTA) 60 MG capsule Take 1 capsule by mouth Daily. (Patient not taking: Reported on 3/27/2024) 90 capsule 4 Not Taking    empagliflozin (JARDIANCE) 25 MG tablet tablet Take 1 tablet by mouth Daily. (Patient not taking: Reported on 3/27/2024) 30 tablet 2 Not Taking    famotidine (PEPCID) 20 MG tablet Take 1 tablet twice a day by oral route. 180 tablet 4     fluticasone (FLONASE) 50 MCG/ACT nasal spray 1 spray into the nostril(s) as directed by provider Daily. (Patient taking differently: 1 spray into the nostril(s) as directed by provider 2 (Two) Times a Day.) 16 g 1     HYDROcodone-acetaminophen (NORCO) 7.5-325 MG per tablet Take 1 tablet by mouth 2 (Two) Times a Day As Needed. (Patient not taking: Reported on 3/27/2024) 40 tablet 0 Not Taking    Insulin Glargine (Lantus SoloStar) 100 UNIT/ML injection pen Inject 20 Units under the skin into the appropriate area as directed Every Evening. 15 mL 3     Insulin Regular Human, Conc, (HumuLIN R U-500 KwikPen) 500 UNIT/ML solution pen-injector CONCENTRATED injection Inject 120 Units under the skin into the appropriate area as directed 2 (Two) Times a Day with breakfast and dinner. (Patient not taking: Reported on 3/27/2024) 18 mL 5 Not Taking    Insulin Regular Human, Conc, (HumuLIN R U-500 KwikPen) 500 UNIT/ML solution pen-injector CONCENTRATED injection Inject 40 Units under the skin into the appropriate area with regular meals  AND 40 Units with large meals. 54 mL 3     isosorbide mononitrate (IMDUR) 30 MG 24 hr tablet Take 1 tablet by mouth Daily.       magnesium hydroxide (Milk of Magnesia) 400 MG/5ML suspension Take 30 mL every day by oral route for 30 days. 900 mL 0     magnesium hydroxide (Milk of Magnesia) 400 MG/5ML suspension Take 30mL by mouth once daily for 30 days. (Patient not taking: Reported on 3/27/2024) 900 mL 0 Not Taking    melatonin 3 MG tablet Take 1 tablet by mouth At Night As Needed for Sleep.       methylcellulose (Citrucel) oral powder Mix 5g as directed and drink twice daily for 90 days. 900 g 10     metoclopramide (REGLAN) 5 MG tablet Take 1 tablet by mouth 3 times a day.       midodrine (PROAMATINE) 2.5 MG tablet Take 1 tablet by mouth 3 (Three) Times a Day Before Meals.       nitroglycerin (NITROSTAT) 0.4 MG SL tablet Dissolve 1 tablet by mouth every 5 minutes as needed for chest pain; MAX 3 tabs/24 hours.  If no improvement after 3 tabs go to ER 25 tablet 0     ondansetron (ZOFRAN) 4 MG tablet Take 1 tablet by mouth Every 6 (Six) Hours As Needed for Nausea or Vomiting.       oxyCODONE (ROXICODONE) 10 MG tablet Take 1 tablet by mouth twice a day as needed 60 tablet 0     pantoprazole (Protonix) 40 MG EC tablet Take 1 tablet by mouth 2 (Two) Times a Day. (Patient not taking: Reported on 3/27/2024) 180 tablet 4 Not Taking    polyethylene glycol (MIRALAX) 17 g packet Take 17 g by mouth Daily. Obtain OTC       prazosin (MINIPRESS) 1 MG capsule Take 1 capsule by mouth every night at bedtime. 30 capsule 2     pregabalin (LYRICA) 100 MG capsule Take 2 capsules by mouth Every Night.       pregabalin (LYRICA) 50 MG capsule Take 1 capsule by mouth Daily.       rosuvastatin (CRESTOR) 10 MG tablet Take 1 tablet by mouth Daily.       sennosides-docusate (PERICOLACE) 8.6-50 MG per tablet Take 1 tablet by mouth Every Night. Obtain OTC       sennosides-docusate (PERICOLACE) 8.6-50 MG per tablet Take 1 tablet by mouth every  night at bedtime. (Patient not taking: Reported on 3/27/2024) 30 tablet 2 Not Taking    sodium hypochlorite (DAKIN'S 1/4 STRENGTH) 0.125 % solution topical solution 0.125% Apply to affected area twice daily (Patient not taking: Reported on 3/27/2024) 473 mL 3 Not Taking    sodium hypochlorite (DAKIN'S 1/4 STRENGTH) 0.125 % solution topical solution 0.125% Apply to affected area twice daily as directed (Patient not taking: Reported on 3/27/2024) 473 mL 5 Not Taking    sodium hypochlorite (DAKIN'S) 0.25 % topical solution Use to wound daily as instructed 473 mL 1     sucralfate (CARAFATE) 1 g tablet Take 1 tablet by mouth 3 times a day.       tamsulosin (FLOMAX) 0.4 MG capsule 24 hr capsule Take 1 capsule by mouth Daily. 90 capsule 1     timolol (TIMOPTIC) 0.5 % ophthalmic solution Administer 1 drop to both eyes 2 (Two) Times a Day.       valsartan (DIOVAN) 40 MG tablet Take 1 tablet by mouth Daily.       zinc sulfate (ZINCATE) 50 MG capsule Take 1 capsule by mouth Daily.          Medicines:  Current Facility-Administered Medications   Medication Dose Route Frequency Provider Last Rate Last Admin    acetaminophen (TYLENOL) tablet 650 mg  650 mg Oral Q4H PRN Raquel Duncan APRN   650 mg at 03/31/24 1416    Or    acetaminophen (TYLENOL) 160 MG/5ML oral solution 650 mg  650 mg Oral Q4H PRN Raquel Duncan APRN   650 mg at 03/27/24 2109    Or    acetaminophen (TYLENOL) suppository 650 mg  650 mg Rectal Q4H PRN Raquel Duncan APRN        apixaban (ELIQUIS) tablet 5 mg  5 mg Oral Q12H Raquel Duncan APRN   5 mg at 03/31/24 0833    ascorbic acid (VITAMIN C) tablet 1,000 mg  1,000 mg Oral Daily Rene Maloney MD   1,000 mg at 03/31/24 0832    aspirin EC tablet 81 mg  81 mg Oral Daily Rene Maloney MD   81 mg at 03/31/24 0833    sennosides-docusate (PERICOLACE) 8.6-50 MG per tablet 1 tablet  1 tablet Oral BID Raquel Duncan APRN   1 tablet at 03/28/24 2126    And    polyethylene glycol (MIRALAX)  packet 17 g  17 g Oral Daily PRN Raquel Duncan APRN        And    bisacodyl (DULCOLAX) EC tablet 5 mg  5 mg Oral Daily PRN Raquel Duncan APRN        And    bisacodyl (DULCOLAX) suppository 10 mg  10 mg Rectal Daily PRN Raquel Duncan APRN        calcitriol (ROCALTROL) capsule 0.5 mcg  0.5 mcg Oral Daily Rene Maloney MD   0.5 mcg at 03/31/24 0833    carvedilol (COREG) tablet 3.125 mg  3.125 mg Oral BID With Meals Rene Maloney MD   3.125 mg at 03/30/24 1748    dextrose (D50W) (25 g/50 mL) IV injection 25 g  25 g Intravenous Q15 Min PRN Raquel Duncan APRN        dextrose (GLUTOSE) oral gel 15 g  15 g Oral Q15 Min PRN Raquel Duncan APRN   15 g at 03/27/24 1710    Diclofenac Sodium (VOLTAREN) 1 % gel 2 g  2 g Topical 4x Daily PRN Rene Maloney MD        dicyclomine (BENTYL) capsule 20 mg  20 mg Oral TID PRN Rene Maloney MD        donepezil (ARICEPT) tablet 10 mg  10 mg Oral Daily Raquel Duncan APRN   10 mg at 03/31/24 0831    famotidine (PEPCID) tablet 20 mg  20 mg Oral Daily Rene Maloney MD   20 mg at 03/31/24 0833    glucagon (GLUCAGEN) injection 1 mg  1 mg Intramuscular Q15 Min PRN Raquel Duncan APRN        haloperidol lactate (HALDOL) injection 5 mg  5 mg Intravenous Q6H PRN Rene Maloney MD        insulin detemir (LEVEMIR) injection 30 Units  30 Units Subcutaneous Nightly Raquel Duncan APRN   30 Units at 03/30/24 2034    Insulin Lispro (humaLOG) injection 4-24 Units  4-24 Units Subcutaneous 4x Daily AC & at Bedtime Raquel Duncan APRN   4 Units at 03/31/24 1202    insulin regular (humuLIN R,novoLIN R) injection 10 Units  10 Units Subcutaneous TID AC Steve De Jesusker, MD   10 Units at 03/31/24 1202    isosorbide mononitrate (IMDUR) 24 hr tablet 30 mg  30 mg Oral Q24H Rene Maloney MD   30 mg at 03/31/24 0833    Linezolid (ZYVOX) 600 mg 300 mL  600 mg Intravenous Q12H Julia Adrian  mL/hr at 03/31/24 1207 600  mg at 03/31/24 1207    LORazepam (ATIVAN) injection 1 mg  1 mg Intravenous Q4H PRN Rene Maloney MD   1 mg at 03/29/24 0140    magnesium hydroxide (MILK OF MAGNESIA) suspension 10 mL  10 mL Oral Daily PRN Rene Maloney MD   10 mL at 03/31/24 1202    melatonin tablet 6 mg  6 mg Oral Nightly Rene Maloney MD   6 mg at 03/30/24 2032    midodrine (PROAMATINE) tablet 2.5 mg  2.5 mg Oral TID AC Rene Maloney MD   2.5 mg at 03/31/24 1202    Morphine sulfate (PF) injection 2 mg  2 mg Intravenous Q3H PRN Rene Maloney MD   2 mg at 03/31/24 1501    mupirocin (BACTROBAN) 2 % nasal ointment 1 Application  1 Application Each Nare BID Nadia Polo K, APRN   1 Application at 03/31/24 0832    nitroglycerin (NITROSTAT) SL tablet 0.4 mg  0.4 mg Sublingual Q5 Min PRN Raquel Duncan, APRN        ondansetron ODT (ZOFRAN-ODT) disintegrating tablet 4 mg  4 mg Oral Q6H PRN Raquel Duncan, APRN   4 mg at 03/30/24 1748    Or    ondansetron (ZOFRAN) injection 4 mg  4 mg Intravenous Q6H PRN Raquel Duncan, APRN   4 mg at 03/29/24 1837    oxyCODONE (ROXICODONE) immediate release tablet 10 mg  10 mg Oral BID PRN Raquel Duncan, APRN   10 mg at 03/31/24 0558    pregabalin (LYRICA) capsule 100 mg  100 mg Oral Nightly Rene Maloney MD   100 mg at 03/30/24 2032    pregabalin (LYRICA) capsule 50 mg  50 mg Oral Daily Rene Maloney MD   50 mg at 03/31/24 0834    rosuvastatin (CRESTOR) tablet 10 mg  10 mg Oral Nightly Rene Maloney MD   10 mg at 03/30/24 2032    sodium chloride 0.9 % flush 10 mL  10 mL Intravenous PRN Steve De Jesus MD        sodium chloride 0.9 % flush 10 mL  10 mL Intravenous Q12H Raquel Duncan APRN   10 mL at 03/31/24 0835    sodium chloride 0.9 % flush 10 mL  10 mL Intravenous PRN Raquel Duncan APRN        sodium chloride 0.9 % infusion 40 mL  40 mL Intravenous PRN Raquel Duncan APRN        sucralfate (CARAFATE) 1 GM/10ML suspension 1 g  1 g Oral  TID AC Rene Maloney MD   1 g at 03/31/24 1207    tamsulosin (FLOMAX) 24 hr capsule 0.4 mg  0.4 mg Oral Daily Rene Maloney MD   0.4 mg at 03/31/24 0834    timolol (TIMOPTIC) 0.25 % ophthalmic solution 1 drop  1 drop Both Eyes Q12H Rene Maloney MD   1 drop at 03/31/24 0835    vitamin D3 capsule 5,000 Units  5,000 Units Oral Daily Rene Maloney MD   5,000 Units at 03/31/24 0832    zinc sulfate (ZINCATE) capsule 220 mg  220 mg Oral Daily Rene Maloney MD   220 mg at 03/31/24 0831       Past Medical History:  Past Medical History:   Diagnosis Date    Arthritis     Autonomic disease     CAD (coronary artery disease) 02/06/2017    Cervical radiculopathy 09/16/2021    Chronic constipation with acute exaccerbation 05/10/2021    Coronary artery disease     Degeneration of cervical intervertebral disc 08/11/2021    Diabetes mellitus     Diabetic foot ulcer 08/31/2020    Diabetic polyneuropathy associated with type 2 diabetes mellitus 01/18/2021    Elevated cholesterol     Gastroesophageal reflux disease 05/13/2019    Headache     HTN (hypertension), benign 05/03/2017    Hyperlipidemia     Hypertension     Mixed hyperlipidemia 02/07/2017    Multiple lung nodules 01/26/2020    5mm, 9 mm RLL identified 1/2020, not present 10/2019.    Myocardial infarction     Osteomyelitis 01/22/2020    Osteomyelitis of fifth toe of right foot 10/07/2019    Pancreatitis     Persistent insomnia 01/20/2020    Renal disorder     Sleep apnea 02/06/2017    Sleep apnea with use of continuous positive airway pressure (CPAP)     NON-COMPLIANT    Slow transit constipation 01/16/2019    Spinal stenosis in cervical region 09/16/2021    Vitamin D deficiency 03/02/2021       Past Surgical History:  Past Surgical History:   Procedure Laterality Date    ABDOMINAL SURGERY      AMPUTATION FOOT / TOE Left 10/2021    5th digit     ANTERIOR CERVICAL DISCECTOMY W/ FUSION N/A 8/5/2022    Procedure: CERVICAL DISCECTOMY ANTERIOR WITH  FUSION C5-6 with possible plating of C5-7 with neuromonitoring and 1 c-arm;  Surgeon: Karel Soliz MD;  Location:  [facility] OR;  Service: Neurosurgery;  Laterality: N/A;    APPENDECTOMY      BACK SURGERY      CARDIAC CATHETERIZATION Left 2021    Procedure: Left Heart Cath w poss intervention left anatomical snuff box acess;  Surgeon: Omkar Charles DO;  Location:  [facility] CATH INVASIVE LOCATION;  Service: Cardiology;  Laterality: Left;    CARDIAC SURGERY      CATARACT EXTRACTION      CERVICAL SPINE SURGERY      COLONOSCOPY N/A 2017    Normal exam repeat in 5 years    COLONOSCOPY N/A 2019    Mild acute inflammation    COLONOSCOPY W/ POLYPECTOMY  2014    Hyperplastic polyp    CORONARY ARTERY BYPASS GRAFT  10/2015    ENDOSCOPY  2011    Gastritis with hemorrhage    ENDOSCOPY N/A 2017    Normal exam    ENDOSCOPY N/A 2019    Gastritis    ENDOSCOPY N/A 2020    Non-erosive gastritis with hemorrhage    ENDOSCOPY N/A 02/10/2021    Esophagitis    FOOT SURGERY Left     INCISION AND DRAINAGE OF WOUND Left 2007    spider bite       Family History  Family History   Problem Relation Age of Onset    Colon cancer Father     Heart disease Father     Colon cancer Sister     Colon polyps Sister     Alzheimer's disease Mother     Coronary artery disease Sister     Coronary artery disease Sister        Social History  Social History     Socioeconomic History    Marital status:    Tobacco Use    Smoking status: Former     Current packs/day: 0.00     Types: Cigarettes     Quit date:      Years since quittin.2    Smokeless tobacco: Never    Tobacco comments:     smoked in highschool   Vaping Use    Vaping status: Never Used   Substance and Sexual Activity    Alcohol use: No    Drug use: No    Sexual activity: Defer       Review of Systems:  History obtained from chart review and the patient  General ROS: No fever or chills  Respiratory ROS: No cough, shortness of  breath, wheezing  Cardiovascular ROS: No chest pain or palpitations  Gastrointestinal ROS: No abdominal pain or melena  Genito-Urinary ROS: No dysuria or hematuria  Psych ROS: No anxiety and depression  14 point ROS reviewed with the patient and negative except as noted above and in the HPI unless unable to obtain.    Objective:  Patient Vitals for the past 24 hrs:   BP Temp Temp src Pulse Resp SpO2   03/31/24 1154 114/50 98.4 °F (36.9 °C) Axillary 67 16 95 %   03/31/24 0816 111/57 97.6 °F (36.4 °C) Axillary 64 16 99 %   03/31/24 0610 120/60 97.6 °F (36.4 °C) Oral 63 16 98 %   03/30/24 2017 120/62 97.7 °F (36.5 °C) Oral 81 16 98 %   03/30/24 1628 -- 97.8 °F (36.6 °C) Axillary -- -- --       Intake/Output Summary (Last 24 hours) at 3/31/2024 1505  Last data filed at 3/31/2024 1400  Gross per 24 hour   Intake 240 ml   Output 2900 ml   Net -2660 ml     General: awake/alert   HEENT: Normocephalic atraumatic head  Neck: Supple with no JVD or carotid bruits  Chest:  clear to auscultation bilaterally without respiratory distress  CVS: regular rate and rhythm  Abdominal: soft, nontender, positive bowel sounds  Extremities:  bilateral 1+ edema  Skin: warm and dry without rash      Labs:  Results from last 7 days   Lab Units 03/31/24 0526 03/30/24 0620 03/29/24  0551   WBC 10*3/mm3 5.01 5.69 6.08   HEMOGLOBIN g/dL 10.3* 10.0* 11.1*   HEMATOCRIT % 31.6* 30.6* 33.6*   PLATELETS 10*3/mm3 149 155 156         Results from last 7 days   Lab Units 03/31/24 0526 03/30/24 0620 03/29/24  0551   SODIUM mmol/L 136 135* 138   POTASSIUM mmol/L 3.6 3.5 3.3*   CHLORIDE mmol/L 101 100 103   CO2 mmol/L 23.0 24.0 23.0   BUN mg/dL 51* 51* 40*   CREATININE mg/dL 2.52* 2.74* 2.07*   CALCIUM mg/dL 8.6 8.2* 8.4*   EGFR mL/min/1.73 27.2* 24.6* 34.5*   BILIRUBIN mg/dL 0.5 0.5 0.7   ALK PHOS U/L 110 95 81   ALT (SGPT) U/L 19 19 17   AST (SGOT) U/L 30 32 32   GLUCOSE mg/dL 201* 104* 120*       Radiology:   Imaging Results (Last 72 Hours)        Procedure Component Value Units Date/Time    MRI Foot Left With & Without Contrast [[id number]] Collected: 03/29/24 1856     Updated: 03/29/24 1901    Narrative:      EXAMINATION: MRI FOOT LEFT W WO CONTRAST-     3/29/2024 4:30 PM     HISTORY: Foot swelling, diabetic, osteomyelitis suspected, xray done;  E11.628-Type 2 diabetes mellitus with other skin complications;  L08.9-Local infection of the skin and subcutaneous tissue, unspecified;  E11.65-Type 2 diabetes mellitus with hyperglycemia; Z74.09-Other reduced  mobility     LEFT foot MRI without and with IV gadolinium contrast.  Axial, sagittal, and coronal sequences.     COMPARISON:  LEFT foot x-rays from 3/26/2024.     There is a tiny metal foreign body within the plantar soft tissues  adjacent to the second toe and despite the small size it produces a  prominent amount of artifact related to image distortion.     I see no soft tissue abscess.     No discrete bone edema or bone enhancement is seen to indicate  osteomyelitis.       Impression:      1. There are some limitations to the study based on metal related  artifact.  2. No soft tissue abscess or definite bone marrow edema or enhancement  is seen to indicate osteomyelitis.           This report was signed and finalized on 3/29/2024 6:58 PM by Dr. Gomez Mcgill MD.               Culture:  Blood Culture   Date Value Ref Range Status   03/26/2024 Staphylococcus aureus, MRSA (C)  Final     Comment:       Infectious disease consultation is highly recommended to rule out distant foci of infection.  Methicillin resistant Staphylococcus aureus, Patient may be an isolation risk.   03/26/2024 Staphylococcus aureus, MRSA (C)  Final     Comment:       Infectious disease consultation is highly recommended to rule out distant foci of infection.  Methicillin resistant Staphylococcus aureus, Patient may be an isolation risk.         Assessment    Acute kidney injury,/improving.  Acute tubular necrosis.  Baseline chronic  kidney disease stage 3b  Type 2 diabetes, poorly controlled (A1c 10.8%)  Hypertension   Metabolic encephalopathy--improving  Anemia of CKD  Hypokalemia--improved  Sepsis due to infection of left foot wound    Plan:  Encourage him to drink more water.  Hold diuretic at this point.  Monitor renal functions      Brian Lomas MD  3/31/2024  15:05 CDT

## 2024-04-01 LAB
ANION GAP SERPL CALCULATED.3IONS-SCNC: 11 MMOL/L (ref 5–15)
BACTERIA ISLT: NORMAL
BASOPHILS # BLD AUTO: 0.02 10*3/MM3 (ref 0–0.2)
BASOPHILS NFR BLD AUTO: 0.4 % (ref 0–1.5)
BUN SERPL-MCNC: 46 MG/DL (ref 8–23)
BUN/CREAT SERPL: 21.1 (ref 7–25)
CALCIUM SPEC-SCNC: 9.1 MG/DL (ref 8.6–10.5)
CHLORIDE SERPL-SCNC: 101 MMOL/L (ref 98–107)
CO2 SERPL-SCNC: 23 MMOL/L (ref 22–29)
CREAT SERPL-MCNC: 2.18 MG/DL (ref 0.76–1.27)
DEPRECATED RDW RBC AUTO: 46.5 FL (ref 37–54)
EGFRCR SERPLBLD CKD-EPI 2021: 32.4 ML/MIN/1.73
EOSINOPHIL # BLD AUTO: 0.24 10*3/MM3 (ref 0–0.4)
EOSINOPHIL NFR BLD AUTO: 4.7 % (ref 0.3–6.2)
ERYTHROCYTE [DISTWIDTH] IN BLOOD BY AUTOMATED COUNT: 14.4 % (ref 12.3–15.4)
GLUCOSE BLDC GLUCOMTR-MCNC: 173 MG/DL (ref 70–130)
GLUCOSE BLDC GLUCOMTR-MCNC: 235 MG/DL (ref 70–130)
GLUCOSE BLDC GLUCOMTR-MCNC: 249 MG/DL (ref 70–130)
GLUCOSE BLDC GLUCOMTR-MCNC: 265 MG/DL (ref 70–130)
GLUCOSE SERPL-MCNC: 292 MG/DL (ref 65–99)
HCT VFR BLD AUTO: 30.6 % (ref 37.5–51)
HGB BLD-MCNC: 9.9 G/DL (ref 13–17.7)
IMM GRANULOCYTES # BLD AUTO: 0.02 10*3/MM3 (ref 0–0.05)
IMM GRANULOCYTES NFR BLD AUTO: 0.4 % (ref 0–0.5)
LYMPHOCYTES # BLD AUTO: 1.46 10*3/MM3 (ref 0.7–3.1)
LYMPHOCYTES NFR BLD AUTO: 28.3 % (ref 19.6–45.3)
MCH RBC QN AUTO: 28.3 PG (ref 26.6–33)
MCHC RBC AUTO-ENTMCNC: 32.4 G/DL (ref 31.5–35.7)
MCV RBC AUTO: 87.4 FL (ref 79–97)
MONOCYTES # BLD AUTO: 0.55 10*3/MM3 (ref 0.1–0.9)
MONOCYTES NFR BLD AUTO: 10.7 % (ref 5–12)
NEUTROPHILS NFR BLD AUTO: 2.86 10*3/MM3 (ref 1.7–7)
NEUTROPHILS NFR BLD AUTO: 55.5 % (ref 42.7–76)
NRBC BLD AUTO-RTO: 0 /100 WBC (ref 0–0.2)
PLATELET # BLD AUTO: 162 10*3/MM3 (ref 140–450)
PMV BLD AUTO: 12.5 FL (ref 6–12)
POTASSIUM SERPL-SCNC: 4.1 MMOL/L (ref 3.5–5.2)
RBC # BLD AUTO: 3.5 10*6/MM3 (ref 4.14–5.8)
SODIUM SERPL-SCNC: 135 MMOL/L (ref 136–145)
WBC NRBC COR # BLD AUTO: 5.15 10*3/MM3 (ref 3.4–10.8)

## 2024-04-01 PROCEDURE — 80048 BASIC METABOLIC PNL TOTAL CA: CPT | Performed by: INTERNAL MEDICINE

## 2024-04-01 PROCEDURE — 63710000001 INSULIN LISPRO (HUMAN) PER 5 UNITS: Performed by: NURSE PRACTITIONER

## 2024-04-01 PROCEDURE — 63710000001 ONDANSETRON ODT 4 MG TABLET DISPERSIBLE: Performed by: NURSE PRACTITIONER

## 2024-04-01 PROCEDURE — 99232 SBSQ HOSP IP/OBS MODERATE 35: CPT | Performed by: INTERNAL MEDICINE

## 2024-04-01 PROCEDURE — 63710000001 INSULIN REGULAR HUMAN PER 5 UNITS: Performed by: EMERGENCY MEDICINE

## 2024-04-01 PROCEDURE — 85025 COMPLETE CBC W/AUTO DIFF WBC: CPT | Performed by: INTERNAL MEDICINE

## 2024-04-01 PROCEDURE — 87040 BLOOD CULTURE FOR BACTERIA: CPT | Performed by: INTERNAL MEDICINE

## 2024-04-01 PROCEDURE — 97535 SELF CARE MNGMENT TRAINING: CPT

## 2024-04-01 PROCEDURE — 97110 THERAPEUTIC EXERCISES: CPT

## 2024-04-01 PROCEDURE — 25010000002 LINEZOLID 600 MG/300ML SOLUTION: Performed by: INTERNAL MEDICINE

## 2024-04-01 PROCEDURE — 25010000002 MORPHINE PER 10 MG: Performed by: INTERNAL MEDICINE

## 2024-04-01 PROCEDURE — 82948 REAGENT STRIP/BLOOD GLUCOSE: CPT

## 2024-04-01 PROCEDURE — 99231 SBSQ HOSP IP/OBS SF/LOW 25: CPT

## 2024-04-01 PROCEDURE — 97116 GAIT TRAINING THERAPY: CPT

## 2024-04-01 PROCEDURE — 63710000001 INSULIN DETEMIR PER 5 UNITS: Performed by: NURSE PRACTITIONER

## 2024-04-01 RX ADMIN — SUCRALFATE 1 G: 1 SUSPENSION ORAL at 08:02

## 2024-04-01 RX ADMIN — OXYCODONE HYDROCHLORIDE AND ACETAMINOPHEN 1000 MG: 500 TABLET ORAL at 08:04

## 2024-04-01 RX ADMIN — MIDODRINE HYDROCHLORIDE 2.5 MG: 2.5 TABLET ORAL at 17:05

## 2024-04-01 RX ADMIN — ZINC SULFATE 220 MG (50 MG) CAPSULE 220 MG: CAPSULE at 08:02

## 2024-04-01 RX ADMIN — CALCITRIOL 0.5 MCG: 0.25 CAPSULE ORAL at 08:04

## 2024-04-01 RX ADMIN — INSULIN LISPRO 4 UNITS: 100 INJECTION, SOLUTION INTRAVENOUS; SUBCUTANEOUS at 17:05

## 2024-04-01 RX ADMIN — Medication 1 APPLICATION: at 20:15

## 2024-04-01 RX ADMIN — INSULIN DETEMIR 30 UNITS: 100 INJECTION, SOLUTION SUBCUTANEOUS at 20:15

## 2024-04-01 RX ADMIN — DOCUSATE SODIUM AND SENNOSIDES 1 TABLET: 8.6; 5 TABLET, FILM COATED ORAL at 20:19

## 2024-04-01 RX ADMIN — INSULIN LISPRO 8 UNITS: 100 INJECTION, SOLUTION INTRAVENOUS; SUBCUTANEOUS at 20:15

## 2024-04-01 RX ADMIN — MIDODRINE HYDROCHLORIDE 2.5 MG: 2.5 TABLET ORAL at 08:03

## 2024-04-01 RX ADMIN — SUCRALFATE 1 G: 1 SUSPENSION ORAL at 17:05

## 2024-04-01 RX ADMIN — MORPHINE SULFATE 2 MG: 2 INJECTION, SOLUTION INTRAMUSCULAR; INTRAVENOUS at 17:41

## 2024-04-01 RX ADMIN — OXYCODONE HYDROCHLORIDE 10 MG: 5 TABLET ORAL at 12:59

## 2024-04-01 RX ADMIN — LINEZOLID 600 MG: 600 INJECTION, SOLUTION INTRAVENOUS at 11:13

## 2024-04-01 RX ADMIN — DONEPEZIL HYDROCHLORIDE 10 MG: 10 TABLET, FILM COATED ORAL at 08:02

## 2024-04-01 RX ADMIN — INSULIN LISPRO 12 UNITS: 100 INJECTION, SOLUTION INTRAVENOUS; SUBCUTANEOUS at 08:10

## 2024-04-01 RX ADMIN — MIDODRINE HYDROCHLORIDE 2.5 MG: 2.5 TABLET ORAL at 11:13

## 2024-04-01 RX ADMIN — DOCUSATE SODIUM AND SENNOSIDES 1 TABLET: 8.6; 5 TABLET, FILM COATED ORAL at 08:01

## 2024-04-01 RX ADMIN — CARVEDILOL 3.12 MG: 3.12 TABLET, FILM COATED ORAL at 17:05

## 2024-04-01 RX ADMIN — Medication 5000 UNITS: at 08:02

## 2024-04-01 RX ADMIN — APIXABAN 5 MG: 5 TABLET, FILM COATED ORAL at 08:02

## 2024-04-01 RX ADMIN — ONDANSETRON 4 MG: 4 TABLET, ORALLY DISINTEGRATING ORAL at 13:13

## 2024-04-01 RX ADMIN — Medication 6 MG: at 20:14

## 2024-04-01 RX ADMIN — ISOSORBIDE MONONITRATE 30 MG: 30 TABLET, EXTENDED RELEASE ORAL at 08:04

## 2024-04-01 RX ADMIN — PREGABALIN 50 MG: 50 CAPSULE ORAL at 08:01

## 2024-04-01 RX ADMIN — INSULIN HUMAN 10 UNITS: 100 INJECTION, SOLUTION PARENTERAL at 11:24

## 2024-04-01 RX ADMIN — SUCRALFATE 1 G: 1 SUSPENSION ORAL at 11:13

## 2024-04-01 RX ADMIN — APIXABAN 5 MG: 5 TABLET, FILM COATED ORAL at 20:14

## 2024-04-01 RX ADMIN — INSULIN LISPRO 8 UNITS: 100 INJECTION, SOLUTION INTRAVENOUS; SUBCUTANEOUS at 11:24

## 2024-04-01 RX ADMIN — CARVEDILOL 3.12 MG: 3.12 TABLET, FILM COATED ORAL at 08:03

## 2024-04-01 RX ADMIN — INSULIN HUMAN 10 UNITS: 100 INJECTION, SOLUTION PARENTERAL at 08:10

## 2024-04-01 RX ADMIN — ASPIRIN 81 MG: 81 TABLET, COATED ORAL at 08:02

## 2024-04-01 RX ADMIN — TIMOLOL MALEATE 1 DROP: 2.5 SOLUTION/ DROPS OPHTHALMIC at 20:15

## 2024-04-01 RX ADMIN — LINEZOLID 600 MG: 600 INJECTION, SOLUTION INTRAVENOUS at 23:35

## 2024-04-01 RX ADMIN — Medication 10 ML: at 08:05

## 2024-04-01 RX ADMIN — TAMSULOSIN HYDROCHLORIDE 0.4 MG: 0.4 CAPSULE ORAL at 08:04

## 2024-04-01 RX ADMIN — FAMOTIDINE 20 MG: 20 TABLET, FILM COATED ORAL at 08:03

## 2024-04-01 RX ADMIN — Medication 10 ML: at 20:16

## 2024-04-01 RX ADMIN — INSULIN HUMAN 10 UNITS: 100 INJECTION, SOLUTION PARENTERAL at 17:06

## 2024-04-01 RX ADMIN — OXYCODONE HYDROCHLORIDE 10 MG: 5 TABLET ORAL at 20:19

## 2024-04-01 RX ADMIN — Medication 1 APPLICATION: at 08:02

## 2024-04-01 RX ADMIN — ACETAMINOPHEN 650 MG: 325 TABLET, FILM COATED ORAL at 04:46

## 2024-04-01 RX ADMIN — TIMOLOL MALEATE 1 DROP: 2.5 SOLUTION/ DROPS OPHTHALMIC at 08:04

## 2024-04-01 RX ADMIN — ROSUVASTATIN CALCIUM 10 MG: 10 TABLET, FILM COATED ORAL at 20:14

## 2024-04-01 NOTE — CASE MANAGEMENT/SOCIAL WORK
Continued Stay Note  [facility]     Patient Name: Erick Luong  MRN: [id number]  Today's Date: 4/1/2024    Admit Date: 3/26/2024    Plan: Home   Discharge Plan       Row Name 04/01/24 1503       Plan    Plan Home    Patient/Family in Agreement with Plan yes    Plan Comments Pt has been working with therapy. Spouse plans for pt to return home at d/c. Will follow.                   Discharge Codes    No documentation.                 Expected Discharge Date and Time       Expected Discharge Date Expected Discharge Time    Apr 3, 2024               MEJIA Carreno

## 2024-04-01 NOTE — PROGRESS NOTES
"INFECTIOUS DISEASES PROGRESS NOTE    Patient:  Erick Luong  YOB: 1955  MRN: [id number]   Admit date: 3/26/2024   Admitting Physician: Latanya Paez MD  Primary Care Physician: Del Shetty MD    Chief Complaint: Got a little dizzy when ambulating back from bathroom.    Interval History: Patient ambulating back from bathroom with physical therapy.  He indicated he got a little dizzy.    He said he tolerated breakfast and \"ate all of it\"    Allergies:   Allergies   Allergen Reactions    Cefepime Hives and Anaphylaxis    Bactrim [Sulfamethoxazole-Trimethoprim] Other (See Comments)     \"RENAL FAILURE\"    Vancomycin Itching    Zolpidem Unknown - High Severity     \"makes him crazy\"    Zolpidem Tartrate Unknown - Low Severity and Provider Review Needed    Metronidazole Rash       Current Scheduled Medications:   apixaban, 5 mg, Oral, Q12H  ascorbic acid, 1,000 mg, Oral, Daily  aspirin, 81 mg, Oral, Daily  calcitriol, 0.5 mcg, Oral, Daily  carvedilol, 3.125 mg, Oral, BID With Meals  donepezil, 10 mg, Oral, Daily  famotidine, 20 mg, Oral, Daily  insulin detemir, 30 Units, Subcutaneous, Nightly  insulin lispro, 4-24 Units, Subcutaneous, 4x Daily AC & at Bedtime  insulin regular, 10 Units, Subcutaneous, TID AC  isosorbide mononitrate, 30 mg, Oral, Q24H  Linezolid, 600 mg, Intravenous, Q12H  melatonin, 6 mg, Oral, Nightly  midodrine, 2.5 mg, Oral, TID AC  mupirocin, 1 Application, Each Nare, BID  pregabalin, 100 mg, Oral, Nightly  pregabalin, 50 mg, Oral, Daily  rosuvastatin, 10 mg, Oral, Nightly  senna-docusate sodium, 1 tablet, Oral, BID  sodium chloride, 10 mL, Intravenous, Q12H  sucralfate, 1 g, Oral, TID AC  tamsulosin, 0.4 mg, Oral, Daily  timolol, 1 drop, Both Eyes, Q12H  vitamin D3, 5,000 Units, Oral, Daily  zinc sulfate, 220 mg, Oral, Daily      Current PRN Medications:    acetaminophen **OR** acetaminophen **OR** acetaminophen    senna-docusate sodium **AND** polyethylene glycol **AND** " "bisacodyl **AND** bisacodyl    dextrose    dextrose    Diclofenac Sodium    dicyclomine    glucagon (human recombinant)    haloperidol lactate    LORazepam    magnesium hydroxide    Morphine    nitroglycerin    ondansetron ODT **OR** ondansetron    oxyCODONE    sodium chloride    sodium chloride    sodium chloride            Objective     Vital Signs:  Temp (24hrs), Av.6 °F (36.4 °C), Min:96.9 °F (36.1 °C), Max:98.4 °F (36.9 °C)      /52 (BP Location: Right arm, Patient Position: Lying)   Pulse 59   Temp 97.6 °F (36.4 °C) (Oral)   Resp 16   Ht 182.9 cm (72\")   Wt 134 kg (295 lb 6.7 oz)   SpO2 100%   BMI 40.07 kg/m²         Physical Exam:  General: Patient was ambulating from the bathroom to the chair with the assistance of physical therapy and walker.  He did not appear to be having any difficulty  Left lower extremity boot in place  Pulse around 75 and regular.                    Results Review:    I reviewed the patient's new clinical results.    Lab Results:    CBC:   Lab Results   Lab 24  1326 24  0449 24  1004 24  0551 24  0620 24  0526 24  0402   WBC 14.69* 12.75* 9.45 6.08 5.69 5.01 5.15   HEMOGLOBIN 11.3* 11.8* 13.7 11.1* 10.0* 10.3* 9.9*   HEMATOCRIT 33.8* 35.9* 41.1 33.6* 30.6* 31.6* 30.6*   PLATELETS 171 163 154 156 155 149 162        AutoDiff:   Lab Results   Lab 24  0620 24  0526 24  0402   NEUTROPHIL % 60.4 55.3 55.5   LYMPHOCYTE % 23.0 28.9 28.3   MONOCYTES % 11.8 10.8 10.7   EOSINOPHIL % 4.0 4.2 4.7   BASOPHIL % 0.4 0.4 0.4   NEUTROS ABS 3.44 2.77 2.86   LYMPHS ABS 1.31 1.45 1.46   MONOS ABS 0.67 0.54 0.55   EOS ABS 0.23 0.21 0.24   BASOS ABS 0.02 0.02 0.02        Manual Diff:    Lab Results   Lab 24  0620 24  0526 24  0402   NEUTROS ABS 3.44 2.77 2.86           CMP:   Lab Results   Lab 24  0551 24  0620 24  0526 24  0402   SODIUM 138 135* 136 135*   POTASSIUM 3.3* 3.5 3.6 4.1 "   CHLORIDE 103 100 101 101   CO2 23.0 24.0 23.0 23.0   BUN 40* 51* 51* 46*   CREATININE 2.07* 2.74* 2.52* 2.18*   CALCIUM 8.4* 8.2* 8.6 9.1   BILIRUBIN 0.7 0.5 0.5  --    ALK PHOS 81 95 110  --    ALT (SGPT) 17 19 19  --    AST (SGOT) 32 32 30  --    GLUCOSE 120* 104* 201* 292*       Estimated Creatinine Clearance: 46.5 mL/min (A) (by C-G formula based on SCr of 2.18 mg/dL (H)).    Culture Results:    Microbiology Results (last 10 days)       Procedure Component Value - Date/Time    Blood Culture With DARSHAN - Blood, Arm, Left [[id number]]  (Normal) Collected: 03/30/24 0620    Lab Status: Preliminary result Specimen: Blood from Arm, Left Updated: 04/01/24 0645     Blood Culture No growth at 2 days    Blood Culture With DARSHAN - Blood, Hand, Right [[id number]]  (Abnormal) Collected: 03/29/24 0843    Lab Status: Preliminary result Specimen: Blood from Hand, Right Updated: 04/01/24 0550     Blood Culture Staphylococcus aureus, MRSA     Comment:   Methicillin resistant Staphylococcus aureus, Patient may be an isolation risk.        Isolated from Aerobic Bottle     Gram Stain Aerobic Bottle Gram positive cocci    Blood Culture ID, PCR - Blood, Hand, Right [[id number]]  (Abnormal) Collected: 03/29/24 0843    Lab Status: Final result Specimen: Blood from Hand, Right Updated: 03/30/24 2028     BCID, PCR Staph aureus. mecA/C and MREJ (methicillin resistance gene) detected. Identification by BCID2 PCR.     BOTTLE TYPE Aerobic Bottle    Narrative:      Infectious disease consultation is highly recommended to rule out distant foci of infection.    MRSA Screen, PCR (Inpatient) - Swab, Nares [[id number]]  (Abnormal) Collected: 03/28/24 2128    Lab Status: Final result Specimen: Swab from Nares Updated: 03/28/24 2239     MRSA PCR MRSA Detected    Narrative:      The negative predictive value of this diagnostic test is high and should only be used to consider de-escalating anti-MRSA therapy. A positive result may indicate colonization with  MRSA and must be correlated clinically.    AMAN AURIS SCREEN - Swab, Axilla Right, Axilla Left and Groin [[id number]]  (Normal) Collected: 03/27/24 0351    Lab Status: Final result Specimen: Swab from Axilla Right, Axilla Left and Groin Updated: 04/01/24 0415     Candida Auris Screen Culture No Candida auris isolated at 5 days    Respiratory Panel PCR w/COVID-19(SARS-CoV-2) [facility] In-House, NP Swab in UTM/VTM, 2 HR TAT - Swab, Nasopharynx [[id number]]  (Normal) Collected: 03/26/24 1732    Lab Status: Final result Specimen: Swab from Nasopharynx Updated: 03/26/24 1905     ADENOVIRUS, PCR Not Detected     Coronavirus 229E Not Detected     Coronavirus HKU1 Not Detected     Coronavirus NL63 Not Detected     Coronavirus OC43 Not Detected     COVID19 Not Detected     Human Metapneumovirus Not Detected     Human Rhinovirus/Enterovirus Not Detected     Influenza A PCR Not Detected     Influenza B PCR Not Detected     Parainfluenza Virus 1 Not Detected     Parainfluenza Virus 2 Not Detected     Parainfluenza Virus 3 Not Detected     Parainfluenza Virus 4 Not Detected     RSV, PCR Not Detected     Bordetella pertussis pcr Not Detected     Bordetella parapertussis PCR Not Detected     Chlamydophila pneumoniae PCR Not Detected     Mycoplasma pneumo by PCR Not Detected    Narrative:      In the setting of a positive respiratory panel with a viral infection PLUS a negative procalcitonin without other underlying concern for bacterial infection, consider observing off antibiotics or discontinuation of antibiotics and continue supportive care. If the respiratory panel is positive for atypical bacterial infection (Bordetella pertussis, Chlamydophila pneumoniae, or Mycoplasma pneumoniae), consider antibiotic de-escalation to target atypical bacterial infection.    Blood Culture - Blood, Arm, Right [[id number]]  (Abnormal) Collected: 03/26/24 1346    Lab Status: Final result Specimen: Blood from Arm, Right Updated:  03/29/24 0513     Blood Culture Staphylococcus aureus, MRSA     Comment:   Infectious disease consultation is highly recommended to rule out distant foci of infection.  Methicillin resistant Staphylococcus aureus, Patient may be an isolation risk.        Isolated from Aerobic and Anaerobic Bottles     Gram Stain Aerobic Bottle Gram positive cocci      Anaerobic Bottle Gram positive cocci    Blood Culture - Blood, Arm, Left [[id number]]  (Abnormal)  (Susceptibility) Collected: 03/26/24 1346    Lab Status: Final result Specimen: Blood from Arm, Left Updated: 03/29/24 0513     Blood Culture Staphylococcus aureus, MRSA     Comment:   Infectious disease consultation is highly recommended to rule out distant foci of infection.  Methicillin resistant Staphylococcus aureus, Patient may be an isolation risk.        Isolated from Aerobic and Anaerobic Bottles     Gram Stain Aerobic Bottle Gram positive cocci      Anaerobic Bottle Gram positive cocci    Susceptibility        Staphylococcus aureus, MRSA      MATT      Gentamicin Susceptible      Oxacillin Resistant      Rifampin Susceptible      Vancomycin Susceptible                       Susceptibility Comments       Staphylococcus aureus, MRSA    This isolate is presumed to be clindamycin resistant based on detection of inducible clindamycin resistance.  Clindamycin may still be effective in some patients.               Blood Culture ID, PCR - Blood, Arm, Left [[id number]]  (Abnormal) Collected: 03/26/24 1346    Lab Status: Final result Specimen: Blood from Arm, Left Updated: 03/27/24 0426     BCID, PCR Staph aureus. mecA/C and MREJ (methicillin resistance gene) detected. Identification by BCID2 PCR.     BOTTLE TYPE Aerobic Bottle    Narrative:      Infectious disease consultation is highly recommended to rule out distant foci of infection.                 Radiology:   Imaging Results (Last 72 Hours)       Procedure Component Value Units Date/Time    MRI Foot Left With &  Without Contrast [[id number]] Collected: 03/29/24 1856     Updated: 03/29/24 1901    Narrative:      EXAMINATION: MRI FOOT LEFT W WO CONTRAST-     3/29/2024 4:30 PM     HISTORY: Foot swelling, diabetic, osteomyelitis suspected, xray done;  E11.628-Type 2 diabetes mellitus with other skin complications;  L08.9-Local infection of the skin and subcutaneous tissue, unspecified;  E11.65-Type 2 diabetes mellitus with hyperglycemia; Z74.09-Other reduced  mobility     LEFT foot MRI without and with IV gadolinium contrast.  Axial, sagittal, and coronal sequences.     COMPARISON:  LEFT foot x-rays from 3/26/2024.     There is a tiny metal foreign body within the plantar soft tissues  adjacent to the second toe and despite the small size it produces a  prominent amount of artifact related to image distortion.     I see no soft tissue abscess.     No discrete bone edema or bone enhancement is seen to indicate  osteomyelitis.       Impression:      1. There are some limitations to the study based on metal related  artifact.  2. No soft tissue abscess or definite bone marrow edema or enhancement  is seen to indicate osteomyelitis.           This report was signed and finalized on 3/29/2024 6:58 PM by Dr. Gomez Mcgill MD.                   Active Hospital Problems    Diagnosis     **Sepsis     Diabetic foot infection     Chronic kidney disease (CKD), stage IV (severe)     Chronic diastolic heart failure     BPH without obstruction/lower urinary tract symptoms     Diabetic polyneuropathy associated with type 2 diabetes mellitus     Anemia due to chronic kidney disease     Essential hypertension     Type 2 diabetes mellitus with hyperglycemia, with long-term current use of insulin        IMPRESSION:  MRSA bacteremia-blood cultures + March 26 and March 29.  Secondary to infected left foot with wounds and associated cellulitis on admission.  Blood culture March 30 remains negative to date.  Chronic kidney disease stage IIIb-with  acute kidney injury-Bumex held.  BUN and creatinine both improved.  Type 2 diabetes mellitus-poorly controlled with hemoglobin A1c of 10.7 this admission-  MRSA nasal screen positive      RECOMMENDATION:   Continue linezolid-continue to encourage increased water intake  Will need to monitor CBC closely given linezolid's risk of bone marrow suppression.  Wound care per Dr. Cerrato's orders  Continue to monitor surveillance blood cultures from  March 30 and April 1.  Follow-up on 2D echo   Glucose management per attending    Currently plan for at least 2 weeks of linezolid with start date after clearance of blood cultures-currently March 30.  Should be able to transition to oral linezolid given increased bioavailability.  Duration may change based on echocardiogram findings.    Reviewed Dr. Lomas's note      Julia Adrian MD  04/01/24  09:57 CDT

## 2024-04-01 NOTE — PLAN OF CARE
Goal Outcome Evaluation:  Plan of Care Reviewed With: patient        Progress: improving  Outcome Evaluation: OT tx completed. Pt in fowlers upon therapist arrival; A&Ox3; c/o abdominal pain. Pt performed supine>sit utilizing bedrail with HOB elevated with SBA. Pt dependent for donning sock and cam boot while seated EOB. Pt performed sit<>stand and ambulated to/from BR utilizing rwx with CGA. Pt performed sit<>stand toilet transfer utilizing rwx and grab bar with CGA. Cont OT POC to address remaining deficits limiting ADL independence. Recommend sub acute rehab at discharge.      Anticipated Discharge Disposition (OT): sub acute care setting

## 2024-04-01 NOTE — PLAN OF CARE
Goal Outcome Evaluation:  Plan of Care Reviewed With: patient        Progress: no change  Outcome Evaluation: Patient rested well. PRN pain medication given x1 thus far. IV abx infusing per order, otherwise IID. Voiding. VSS. Safety maintained.

## 2024-04-01 NOTE — PLAN OF CARE
Goal Outcome Evaluation:      Pt has required 1 dose of Zofran for nausea and 1 dose of Oxycodone PO for pain control. Pt working with physical therapy. Wound care to left foot twice a day. Continuing IV antibiotic-Zyvox. Monitoring labs and blood glucose levels.

## 2024-04-01 NOTE — PROGRESS NOTES
[facility] - PODIATRY    Today's Date: 04/01/24     Patient Name: Erick Luong  MRN: [id number]  CSN: [id number]  PCP: Del Shetty MD  Referring Provider: No ref. provider found  Attending Provider: Latanya Paez MD  Length of Stay: 6    SUBJECTIVE   Chief Complaint: Left foot wounds    HPI: Erick Luong, a 68 y.o.male, who was admitted on 3/26/2024 and podiatry consult was made for left foot wounds.      Patient is up in bedside chair and alert today. He denies any significant overnight events. He did report he was able to get his MRI on Friday 03/29/2024.      Past Medical History:   Diagnosis Date    Arthritis     Autonomic disease     CAD (coronary artery disease) 02/06/2017    Cervical radiculopathy 09/16/2021    Chronic constipation with acute exaccerbation 05/10/2021    Coronary artery disease     Degeneration of cervical intervertebral disc 08/11/2021    Diabetes mellitus     Diabetic foot ulcer 08/31/2020    Diabetic polyneuropathy associated with type 2 diabetes mellitus 01/18/2021    Elevated cholesterol     Gastroesophageal reflux disease 05/13/2019    Headache     HTN (hypertension), benign 05/03/2017    Hyperlipidemia     Hypertension     Mixed hyperlipidemia 02/07/2017    Multiple lung nodules 01/26/2020    5mm, 9 mm RLL identified 1/2020, not present 10/2019.    Myocardial infarction     Osteomyelitis 01/22/2020    Osteomyelitis of fifth toe of right foot 10/07/2019    Pancreatitis     Persistent insomnia 01/20/2020    Renal disorder     Sleep apnea 02/06/2017    Sleep apnea with use of continuous positive airway pressure (CPAP)     NON-COMPLIANT    Slow transit constipation 01/16/2019    Spinal stenosis in cervical region 09/16/2021    Vitamin D deficiency 03/02/2021     Past Surgical History:   Procedure Laterality Date    ABDOMINAL SURGERY      AMPUTATION FOOT / TOE Left 10/2021    5th digit     ANTERIOR CERVICAL DISCECTOMY W/ FUSION N/A 8/5/2022    Procedure: CERVICAL  "DISCECTOMY ANTERIOR WITH FUSION C5-6 with possible plating of C5-7 with neuromonitoring and 1 c-arm;  Surgeon: Karel Soliz MD;  Location:  [facility] OR;  Service: Neurosurgery;  Laterality: N/A;    APPENDECTOMY      BACK SURGERY      CARDIAC CATHETERIZATION Left 2021    Procedure: Left Heart Cath w poss intervention left anatomical snuff box acess;  Surgeon: Omkar Charles DO;  Location:  [facility] CATH INVASIVE LOCATION;  Service: Cardiology;  Laterality: Left;    CARDIAC SURGERY      CATARACT EXTRACTION      CERVICAL SPINE SURGERY      COLONOSCOPY N/A 2017    Normal exam repeat in 5 years    COLONOSCOPY N/A 2019    Mild acute inflammation    COLONOSCOPY W/ POLYPECTOMY  2014    Hyperplastic polyp    CORONARY ARTERY BYPASS GRAFT  10/2015    ENDOSCOPY  2011    Gastritis with hemorrhage    ENDOSCOPY N/A 2017    Normal exam    ENDOSCOPY N/A 2019    Gastritis    ENDOSCOPY N/A 2020    Non-erosive gastritis with hemorrhage    ENDOSCOPY N/A 02/10/2021    Esophagitis    FOOT SURGERY Left     INCISION AND DRAINAGE OF WOUND Left 2007    spider bite     Family History   Problem Relation Age of Onset    Colon cancer Father     Heart disease Father     Colon cancer Sister     Colon polyps Sister     Alzheimer's disease Mother     Coronary artery disease Sister     Coronary artery disease Sister      Social History     Socioeconomic History    Marital status:    Tobacco Use    Smoking status: Former     Current packs/day: 0.00     Types: Cigarettes     Quit date:      Years since quittin.2    Smokeless tobacco: Never    Tobacco comments:     smoked in highschool   Vaping Use    Vaping status: Never Used   Substance and Sexual Activity    Alcohol use: No    Drug use: No    Sexual activity: Defer     Allergies   Allergen Reactions    Cefepime Hives and Anaphylaxis    Bactrim [Sulfamethoxazole-Trimethoprim] Other (See Comments)     \"RENAL FAILURE\"    " "Vancomycin Itching    Zolpidem Unknown - High Severity     \"makes him crazy\"    Zolpidem Tartrate Unknown - Low Severity and Provider Review Needed    Metronidazole Rash     Current Facility-Administered Medications   Medication Dose Route Frequency Provider Last Rate Last Admin    acetaminophen (TYLENOL) tablet 650 mg  650 mg Oral Q4H PRN Raquel Duncan APRN   650 mg at 04/01/24 0446    Or    acetaminophen (TYLENOL) 160 MG/5ML oral solution 650 mg  650 mg Oral Q4H PRN Raquel Duncan APRN   650 mg at 03/27/24 2109    Or    acetaminophen (TYLENOL) suppository 650 mg  650 mg Rectal Q4H PRN Raquel Duncan APRN        apixaban (ELIQUIS) tablet 5 mg  5 mg Oral Q12H Raquel Duncan APRN   5 mg at 04/01/24 0802    ascorbic acid (VITAMIN C) tablet 1,000 mg  1,000 mg Oral Daily Rene Maloney MD   1,000 mg at 04/01/24 0804    aspirin EC tablet 81 mg  81 mg Oral Daily Rene Maloney MD   81 mg at 04/01/24 0802    sennosides-docusate (PERICOLACE) 8.6-50 MG per tablet 1 tablet  1 tablet Oral BID Raquel Duncan APRN   1 tablet at 04/01/24 0801    And    polyethylene glycol (MIRALAX) packet 17 g  17 g Oral Daily PRN Raquel Duncan APRN        And    bisacodyl (DULCOLAX) EC tablet 5 mg  5 mg Oral Daily PRN Raquel Duncan APRN        And    bisacodyl (DULCOLAX) suppository 10 mg  10 mg Rectal Daily PRN Raquel Duncan APRN        calcitriol (ROCALTROL) capsule 0.5 mcg  0.5 mcg Oral Daily Rene Maloney MD   0.5 mcg at 04/01/24 0804    carvedilol (COREG) tablet 3.125 mg  3.125 mg Oral BID With Meals Rene Maloney MD   3.125 mg at 04/01/24 1705    dextrose (D50W) (25 g/50 mL) IV injection 25 g  25 g Intravenous Q15 Min PRN Duncan, Raquel D, APRN        dextrose (GLUTOSE) oral gel 15 g  15 g Oral Q15 Min PRN Raquel Duncan, SUSAN   15 g at 03/27/24 1710    Diclofenac Sodium (VOLTAREN) 1 % gel 2 g  2 g Topical 4x Daily PRN Rene Maloney MD        dicyclomine (BENTYL) capsule " 20 mg  20 mg Oral TID PRN Rene Maloney MD   20 mg at 03/31/24 1603    donepezil (ARICEPT) tablet 10 mg  10 mg Oral Daily Raquel Duncan APRN   10 mg at 04/01/24 0802    famotidine (PEPCID) tablet 20 mg  20 mg Oral Daily Rene Maloney MD   20 mg at 04/01/24 0803    glucagon (GLUCAGEN) injection 1 mg  1 mg Intramuscular Q15 Min PRN Raquel Duncan APRN        haloperidol lactate (HALDOL) injection 5 mg  5 mg Intravenous Q6H PRN Rene Maloney MD        insulin detemir (LEVEMIR) injection 30 Units  30 Units Subcutaneous Nightly Raquel Duncan APRN   30 Units at 03/31/24 2023    Insulin Lispro (humaLOG) injection 4-24 Units  4-24 Units Subcutaneous 4x Daily AC & at Bedtime Raquel Duncan APRN   4 Units at 04/01/24 1705    insulin regular (humuLIN R,novoLIN R) injection 10 Units  10 Units Subcutaneous TID AC Steve De Jesus MD   10 Units at 04/01/24 1706    isosorbide mononitrate (IMDUR) 24 hr tablet 30 mg  30 mg Oral Q24H Rene Maloney MD   30 mg at 04/01/24 0804    Linezolid (ZYVOX) 600 mg 300 mL  600 mg Intravenous Q12H Julia Adrian  mL/hr at 04/01/24 1113 600 mg at 04/01/24 1113    magnesium hydroxide (MILK OF MAGNESIA) suspension 10 mL  10 mL Oral Daily PRN Rene Maloney MD   10 mL at 03/31/24 1202    melatonin tablet 6 mg  6 mg Oral Nightly Rene Maloney MD   6 mg at 03/31/24 2024    midodrine (PROAMATINE) tablet 2.5 mg  2.5 mg Oral TID AC Rene Maloney MD   2.5 mg at 04/01/24 1705    Morphine sulfate (PF) injection 2 mg  2 mg Intravenous Q3H PRN Rene Maloney MD   2 mg at 03/31/24 1501    mupirocin (BACTROBAN) 2 % nasal ointment 1 Application  1 Application Each Nare BID Nadia Polo, APRN   1 Application at 04/01/24 0802    nitroglycerin (NITROSTAT) SL tablet 0.4 mg  0.4 mg Sublingual Q5 Min PRN Raquel Duncan, APRN        ondansetron ODT (ZOFRAN-ODT) disintegrating tablet 4 mg  4 mg Oral Q6H PRN Raquel Duncan, APRN    4 mg at 04/01/24 1313    Or    ondansetron (ZOFRAN) injection 4 mg  4 mg Intravenous Q6H PRN Raquel Duncan APRN   4 mg at 03/29/24 1837    oxyCODONE (ROXICODONE) immediate release tablet 10 mg  10 mg Oral BID PRN Raquel Duncan APRN   10 mg at 04/01/24 1259    pregabalin (LYRICA) capsule 100 mg  100 mg Oral Nightly Rene Maloney MD   100 mg at 03/31/24 2023    pregabalin (LYRICA) capsule 50 mg  50 mg Oral Daily Rene Maloney MD   50 mg at 04/01/24 0801    rosuvastatin (CRESTOR) tablet 10 mg  10 mg Oral Nightly Rene Maloney MD   10 mg at 03/31/24 2024    sodium chloride 0.9 % flush 10 mL  10 mL Intravenous PRN Steve De Jesus MD        sodium chloride 0.9 % flush 10 mL  10 mL Intravenous Q12H Raquel Duncan APRN   10 mL at 04/01/24 0805    sodium chloride 0.9 % flush 10 mL  10 mL Intravenous PRN Raquel Duncan APRN        sodium chloride 0.9 % infusion 40 mL  40 mL Intravenous PRN Raquel Duncan APRN        sucralfate (CARAFATE) 1 GM/10ML suspension 1 g  1 g Oral TID AC Rene Maloney MD   1 g at 04/01/24 1705    tamsulosin (FLOMAX) 24 hr capsule 0.4 mg  0.4 mg Oral Daily Rene Maloney MD   0.4 mg at 04/01/24 0804    timolol (TIMOPTIC) 0.25 % ophthalmic solution 1 drop  1 drop Both Eyes Q12H Rene Maloney MD   1 drop at 04/01/24 0804    vitamin D3 capsule 5,000 Units  5,000 Units Oral Daily Rene Maloney MD   5,000 Units at 04/01/24 0802    zinc sulfate (ZINCATE) capsule 220 mg  220 mg Oral Daily Rene Maloney MD   220 mg at 04/01/24 0802     Review of Systems   Constitutional:  Negative for activity change.   HENT:  Negative for congestion.    Respiratory:  Negative for apnea and shortness of breath.    Gastrointestinal:  Negative for abdominal pain.   Musculoskeletal:  Positive for arthralgias. Negative for back pain.   Skin:  Positive for wound.   Neurological:  Positive for numbness.   Psychiatric/Behavioral:  Negative for agitation,  behavioral problems and confusion.        OBJECTIVE     Vitals:    04/01/24 1554   BP: 134/63   Pulse: 67   Resp: 16   Temp: 97.6 °F (36.4 °C)   SpO2: 99%       PHYSICAL EXAM  GEN:   Pt presents up in bedside chair. Accompanied by none.     Foot/Ankle Exam    GENERAL  Appearance:  appears stated age  Orientation:  AAOx3  Affect:  appropriate    VASCULAR     Right Foot Vascularity   Dorsalis pedis:  2+  Posterior tibial:  2+  Skin temperature:  warm  Edema grading:  Trace  CFT:  3  Varicosities:  none     Left Foot Vascularity   Posterior tibial:  2+  Skin temperature:  warm  Edema grading:  Trace  CFT:  3  Pedal hair growth:  Absent  Varicosities:  none     NEUROLOGIC     Right Foot Neurologic   Light touch sensation: diminished  Vibratory sensation: diminished  Hot/Cold sensation: diminished     Left Foot Neurologic   Light touch sensation: diminished  Vibratory sensation: diminished  Hot/Cold sensation:  diminished    MUSCULOSKELETAL     Right Foot Musculoskeletal   Tenderness:  none       Left Foot Musculoskeletal    Amputation   Toes amputated: fifth toe  Tenderness:  none    MUSCLE STRENGTH     Right Foot Muscle Strength   Foot dorsiflexion:  5  Foot plantar flexion:  5  Foot inversion:  5  Foot eversion:  5     Left Foot Muscle Strength   Foot dorsiflexion:  4+  Foot plantar flexion:  4+  Foot inversion:  4+  Foot eversion:  4+    DERMATOLOGIC      Right Foot Dermatologic   Skin  Positive for wound.      Left Foot Dermatologic   Skin  Positive for wound.      Left foot additional comments: Multiple wounds  See photos    Image:                  RADIOLOGY/NUCLEAR:  MRI Foot Left With & Without Contrast    Result Date: 3/29/2024  Narrative: EXAMINATION: MRI FOOT LEFT W WO CONTRAST-  3/29/2024 4:30 PM  HISTORY: Foot swelling, diabetic, osteomyelitis suspected, xray done; E11.628-Type 2 diabetes mellitus with other skin complications; L08.9-Local infection of the skin and subcutaneous tissue, unspecified;  E11.65-Type 2 diabetes mellitus with hyperglycemia; Z74.09-Other reduced mobility  LEFT foot MRI without and with IV gadolinium contrast. Axial, sagittal, and coronal sequences.  COMPARISON: LEFT foot x-rays from 3/26/2024.  There is a tiny metal foreign body within the plantar soft tissues adjacent to the second toe and despite the small size it produces a prominent amount of artifact related to image distortion.  I see no soft tissue abscess.  No discrete bone edema or bone enhancement is seen to indicate osteomyelitis.      Impression: 1. There are some limitations to the study based on metal related artifact. 2. No soft tissue abscess or definite bone marrow edema or enhancement is seen to indicate osteomyelitis.    This report was signed and finalized on 3/29/2024 6:58 PM by Dr. Gomez Mcgill MD.      XR Foot 3+ View Left    Result Date: 3/26/2024  Narrative: EXAMINATION: XR FOOT 3+ VW LEFT-  3/26/2024 4:19 PM  HISTORY: foot infection  3 view LEFT foot exam.  Previous amputation of the mid/distal fifth metatarsal.  Interval resection or resorption of the fourth metatarsal head.  No bone destruction or soft tissue air is seen.  High-grade degenerative disease at the first metatarsal phalangeal joint.  Unchanged appearance of prominent dorsal midfoot spurring and prominent inferior calcaneal spurring.  Mild soft tissue edema over the dorsum of the foot is less prominent as compared with the previous exam.      Impression: 1. No bone destruction or soft tissue air is seen. 2. Prominent degenerative spurring.    This report was signed and finalized on 3/26/2024 5:25 PM by Dr. Gomez Mcgill MD.      XR Chest 1 View    Result Date: 3/26/2024  Narrative: EXAM: XR CHEST 1 VW-  DATE: 3/26/2024 1:15 PM  HISTORY: ams   COMPARISON: 8/28/2023.  TECHNIQUE:  Frontal view(s) of the chest submitted.  FINDINGS:  Patient is status post median sternotomy and CABG. There is enlargement of the cardiac silhouette. There are low lung  volumes with vascular crowding versus mild volume overload. No effusion or pneumothorax is seen.       Impression:  1. Postoperative chest and low lung volumes with vascular crowding versus mild volume overload.  This report was signed and finalized on 3/26/2024 2:34 PM by Eran Christina.      CT Head Without Contrast    Result Date: 3/26/2024  Narrative: EXAM: CT HEAD WO CONTRAST-  DATE: 3/26/2024 1:03 PM  HISTORY: ams   COMPARISON: 10/10/2023.  DOSE LENGTH PRODUCT: 876.8 mGy.cm  Automated exposure control was also utilized to decrease patient radiation dose.  TECHNIQUE: Unenhanced CT images obtained from vertex to skull base with multiplanar reformats.  FINDINGS: There is no acute intracranial hemorrhage, midline shift, mass effect, or hydrocephalus. There is no CT evidence for acute infarct.  There are chronic changes with volume loss and chronic small vessel ischemic change of the periventricular white matter.  SOFT TISSUES: The scalp soft tissues are unremarkable.   SINUS: There is partially imaged mucosal thickening at the right maxillary sinus.  ORBITS: The visualized orbits and globes are unremarkable. There is bilateral lens extraction.       Impression: 1. Chronic changes and no acute intracranial findings.  This report was signed and finalized on 3/26/2024 2:10 PM by Eran Christina.       LABORATORY/CULTURE RESULTS:  Results from last 7 days   Lab Units 04/01/24  0402 03/31/24  0526 03/30/24  0620   WBC 10*3/mm3 5.15 5.01 5.69   HEMOGLOBIN g/dL 9.9* 10.3* 10.0*   HEMATOCRIT % 30.6* 31.6* 30.6*   PLATELETS 10*3/mm3 162 149 155     Results from last 7 days   Lab Units 04/01/24  0402 03/31/24  0526 03/30/24  0620 03/29/24  0551   SODIUM mmol/L 135* 136 135* 138   POTASSIUM mmol/L 4.1 3.6 3.5 3.3*   CHLORIDE mmol/L 101 101 100 103   CO2 mmol/L 23.0 23.0 24.0 23.0   BUN mg/dL 46* 51* 51* 40*   CREATININE mg/dL 2.18* 2.52* 2.74* 2.07*   CALCIUM mg/dL 9.1 8.6 8.2* 8.4*   BILIRUBIN mg/dL  --  0.5 0.5 0.7    ALK PHOS U/L  --  110 95 81   ALT (SGPT) U/L  --  19 19 17   AST (SGOT) U/L  --  30 32 32   GLUCOSE mg/dL 292* 201* 104* 120*     Results from last 7 days   Lab Units 03/26/24  1326   INR  1.07     Microbiology Results (last 10 days)       Procedure Component Value - Date/Time    Blood Culture With DARSHAN - Blood, Hand, Right [[id number]]  (Normal) Collected: 04/01/24 0402    Lab Status: Preliminary result Specimen: Blood from Hand, Right Updated: 04/01/24 1645     Blood Culture No growth at less than 24 hours    Blood Culture With DARSHAN - Blood, Arm, Left [[id number]]  (Normal) Collected: 03/30/24 0620    Lab Status: Preliminary result Specimen: Blood from Arm, Left Updated: 04/01/24 0645     Blood Culture No growth at 2 days    Blood Culture With DARSHAN - Blood, Hand, Right [[id number]]  (Abnormal) Collected: 03/29/24 0843    Lab Status: Preliminary result Specimen: Blood from Hand, Right Updated: 04/01/24 0550     Blood Culture Staphylococcus aureus, MRSA     Comment:   Methicillin resistant Staphylococcus aureus, Patient may be an isolation risk.        Isolated from Aerobic Bottle     Gram Stain Aerobic Bottle Gram positive cocci    Blood Culture ID, PCR - Blood, Hand, Right [[id number]]  (Abnormal) Collected: 03/29/24 0843    Lab Status: Final result Specimen: Blood from Hand, Right Updated: 03/30/24 2028     BCID, PCR Staph aureus. mecA/C and MREJ (methicillin resistance gene) detected. Identification by BCID2 PCR.     BOTTLE TYPE Aerobic Bottle    Narrative:      Infectious disease consultation is highly recommended to rule out distant foci of infection.    MRSA Screen, PCR (Inpatient) - Swab, Nares [[id number]]  (Abnormal) Collected: 03/28/24 2128    Lab Status: Final result Specimen: Swab from Nares Updated: 03/28/24 2239     MRSA PCR MRSA Detected    Narrative:      The negative predictive value of this diagnostic test is high and should only be used to consider de-escalating anti-MRSA therapy. A positive result may  indicate colonization with MRSA and must be correlated clinically.    AMAN AURIS SCREEN - Swab, Axilla Right, Axilla Left and Groin [[id number]]  (Normal) Collected: 03/27/24 0351    Lab Status: Final result Specimen: Swab from Axilla Right, Axilla Left and Groin Updated: 04/01/24 0415     Candida Auris Screen Culture No Candida auris isolated at 5 days    Respiratory Panel PCR w/COVID-19(SARS-CoV-2) [facility] In-House, NP Swab in UTM/VTM, 2 HR TAT - Swab, Nasopharynx [[id number]]  (Normal) Collected: 03/26/24 1732    Lab Status: Final result Specimen: Swab from Nasopharynx Updated: 03/26/24 1905     ADENOVIRUS, PCR Not Detected     Coronavirus 229E Not Detected     Coronavirus HKU1 Not Detected     Coronavirus NL63 Not Detected     Coronavirus OC43 Not Detected     COVID19 Not Detected     Human Metapneumovirus Not Detected     Human Rhinovirus/Enterovirus Not Detected     Influenza A PCR Not Detected     Influenza B PCR Not Detected     Parainfluenza Virus 1 Not Detected     Parainfluenza Virus 2 Not Detected     Parainfluenza Virus 3 Not Detected     Parainfluenza Virus 4 Not Detected     RSV, PCR Not Detected     Bordetella pertussis pcr Not Detected     Bordetella parapertussis PCR Not Detected     Chlamydophila pneumoniae PCR Not Detected     Mycoplasma pneumo by PCR Not Detected    Narrative:      In the setting of a positive respiratory panel with a viral infection PLUS a negative procalcitonin without other underlying concern for bacterial infection, consider observing off antibiotics or discontinuation of antibiotics and continue supportive care. If the respiratory panel is positive for atypical bacterial infection (Bordetella pertussis, Chlamydophila pneumoniae, or Mycoplasma pneumoniae), consider antibiotic de-escalation to target atypical bacterial infection.    Blood Culture - Blood, Arm, Right [[id number]]  (Abnormal) Collected: 03/26/24 1346    Lab Status: Final result Specimen: Blood  from Arm, Right Updated: 03/29/24 0513     Blood Culture Staphylococcus aureus, MRSA     Comment:   Infectious disease consultation is highly recommended to rule out distant foci of infection.  Methicillin resistant Staphylococcus aureus, Patient may be an isolation risk.        Isolated from Aerobic and Anaerobic Bottles     Gram Stain Aerobic Bottle Gram positive cocci      Anaerobic Bottle Gram positive cocci    Blood Culture - Blood, Arm, Left [[id number]]  (Abnormal)  (Susceptibility) Collected: 03/26/24 1346    Lab Status: Final result Specimen: Blood from Arm, Left Updated: 03/29/24 0513     Blood Culture Staphylococcus aureus, MRSA     Comment:   Infectious disease consultation is highly recommended to rule out distant foci of infection.  Methicillin resistant Staphylococcus aureus, Patient may be an isolation risk.        Isolated from Aerobic and Anaerobic Bottles     Gram Stain Aerobic Bottle Gram positive cocci      Anaerobic Bottle Gram positive cocci    Susceptibility        Staphylococcus aureus, MRSA      MATT      Gentamicin Susceptible      Oxacillin Resistant      Rifampin Susceptible      Vancomycin Susceptible                       Susceptibility Comments       Staphylococcus aureus, MRSA    This isolate is presumed to be clindamycin resistant based on detection of inducible clindamycin resistance.  Clindamycin may still be effective in some patients.               Blood Culture ID, PCR - Blood, Arm, Left [[id number]]  (Abnormal) Collected: 03/26/24 1346    Lab Status: Final result Specimen: Blood from Arm, Left Updated: 03/27/24 0426     BCID, PCR Staph aureus. mecA/C and MREJ (methicillin resistance gene) detected. Identification by BCID2 PCR.     BOTTLE TYPE Aerobic Bottle    Narrative:      Infectious disease consultation is highly recommended to rule out distant foci of infection.            PATHOLOGY RESULTS:       ASSESSMENT/PLAN   Diabetic foot ulcerations to left foot  Type 2 diabetes  mellitus with hyperglycemia  Diabetic polyneuropathy  Sepsis   Metabolic encephalopathy secondary to sepsis-improving    Continue wound care twice daily with Betadine soaked gauze wet-to-dry to be changed by nursing.    To continue antibiotic therapy per infectious disease.    MRI impression shows no soft tissue abscess, no definite bone marrow edema, and no enhancement seen to indicate osteomyelitis at this time.    Recommend patient to see outpatient wound care upon discharge.     Will continue to follow patient while admitted.       This document has been electronically signed by SUSAN Luna on April 1, 2024 17:14 CDT

## 2024-04-01 NOTE — PROGRESS NOTES
[facility]  INPATIENT PROGRESS NOTE    Patient Name: Erick Luong  Date of Admission: 3/26/2024  Today's Date: 04/01/24  Length of Stay: 6  Primary Care Physician: Del Shetty MD    Subjective   Chief Complaint: Altered mental status    Altered Mental Status    Hyperglycemia    3/31   Patient is alert and oriented x 3.  He appears to be in good spirits.  He is requesting for discharge.  He was counseled that we will need to wait until later in this week, after we set up IV antibiotics before we can discharge him.    4/1  Patient was admitted to ER on 3/26 with altered mental status. Had debridement of ongoing wound on left foot the day prior. Left foot warm and erythremic on arrival to ER. Noted to have elevated blood glucose over 400. Initial creatinine of 1.9.  Appreciate nephrology were consulted for acute on chronic renal failure MRI of the left foot did not show any osteomyelitis or abscess.  Appreciate ID patient was found to have MRSA bacteremia-blood cultures + March 26 and March 29.  Secondary to infected left foot with wounds and associated cellulitis on admission patient was started on Zyvox plan is for echo today rule out endocarditis given persistent bacteremia holding off on PICC line pending that   Review of Systems   All pertinent negatives and positives are as above. All other systems have been reviewed and are negative unless otherwise stated.     Objective    Temp:  [96.9 °F (36.1 °C)-98.4 °F (36.9 °C)] 97.6 °F (36.4 °C)  Heart Rate:  [59-70] 59  Resp:  [16-18] 16  BP: (114-127)/(50-63) 115/52  Physical Exam  Constitutional:       General: He is not in acute distress.     Appearance: He is well-developed. He is not diaphoretic.   HENT:      Head: Normocephalic.   Eyes:      General: No scleral icterus.     Conjunctiva/sclera: Conjunctivae normal.      Pupils: Pupils are equal, round, and reactive to light.   Neck:      Thyroid: No  thyromegaly.      Vascular: No JVD.      Trachea: No tracheal deviation.   Cardiovascular:      Rate and Rhythm: Normal rate and regular rhythm.      Heart sounds: Normal heart sounds. No murmur heard.     No friction rub. No gallop.   Pulmonary:      Effort: Pulmonary effort is normal. No respiratory distress.      Breath sounds: Normal breath sounds. No stridor. No wheezing or rales.   Chest:      Chest wall: No tenderness.   Abdominal:      General: Bowel sounds are normal. There is no distension.      Palpations: Abdomen is soft. There is no mass.      Tenderness: There is no abdominal tenderness. There is no guarding or rebound.      Hernia: No hernia is present.   Musculoskeletal:         General: Swelling and signs of injury present. No tenderness or deformity. Normal range of motion.      Cervical back: Normal range of motion and neck supple.      Right lower leg: No edema.      Comments: Dressing over left foot noted.   Lymphadenopathy:      Cervical: No cervical adenopathy.   Skin:     General: Skin is warm and dry.      Coloration: Skin is not pale.      Findings: No erythema or rash.   Neurological:      General: No focal deficit present.      Mental Status: He is alert and oriented to person, place, and time.      Cranial Nerves: No cranial nerve deficit.      Sensory: No sensory deficit.      Motor: No abnormal muscle tone.      Coordination: Coordination normal.   Psychiatric:         Mood and Affect: Mood normal.         Behavior: Behavior normal.            File Link    Scan on 3/26/2024 1423 by Ro Vinson RN: Wound 06/15/23 0102 Left anterior plantar        Key Information    Document ID File Type Document Type Description   [id number] Image WOUND IMAGE Wound 06/15/23 0102 Left anterior plantar     Import Information    Attached At Date Time User Dept   Encounter Level 3/26/2024  2:23 PM Ro Vinson RN [facility] Emergency Dept     Encounter    Hospital Encounter on 3/26/24     Results  Review:  I have reviewed the labs, radiology results, and diagnostic studies.    Laboratory Data:   Results from last 7 days   Lab Units 04/01/24  0402 03/31/24  0526 03/30/24  0620   WBC 10*3/mm3 5.15 5.01 5.69   HEMOGLOBIN g/dL 9.9* 10.3* 10.0*   HEMATOCRIT % 30.6* 31.6* 30.6*   PLATELETS 10*3/mm3 162 149 155        Results from last 7 days   Lab Units 04/01/24  0402 03/31/24  0526 03/30/24  0620 03/29/24  0551   SODIUM mmol/L 135* 136 135* 138   POTASSIUM mmol/L 4.1 3.6 3.5 3.3*   CHLORIDE mmol/L 101 101 100 103   CO2 mmol/L 23.0 23.0 24.0 23.0   BUN mg/dL 46* 51* 51* 40*   CREATININE mg/dL 2.18* 2.52* 2.74* 2.07*   CALCIUM mg/dL 9.1 8.6 8.2* 8.4*   BILIRUBIN mg/dL  --  0.5 0.5 0.7   ALK PHOS U/L  --  110 95 81   ALT (SGPT) U/L  --  19 19 17   AST (SGOT) U/L  --  30 32 32   GLUCOSE mg/dL 292* 201* 104* 120*       Culture Data:   Blood Culture   Date Value Ref Range Status   03/26/2024 Abnormal Stain (C)  Preliminary   03/26/2024 Abnormal Stain (C)  Preliminary       Radiology Data:   Imaging Results (Last 24 Hours)       ** No results found for the last 24 hours. **            I have reviewed the patient's current medications.     Assessment/Plan   Assessment  Active Hospital Problems    Diagnosis     **Sepsis     Diabetic foot infection     Chronic kidney disease (CKD), stage IV (severe)     Chronic diastolic heart failure     BPH without obstruction/lower urinary tract symptoms     Diabetic polyneuropathy associated with type 2 diabetes mellitus     Anemia due to chronic kidney disease     Essential hypertension     Type 2 diabetes mellitus with hyperglycemia, with long-term current use of insulin    MRSA bacteremia  Metabolic encephalopathy secondary to sepsis  WANDER on CKD    Treatment Plan  Continue IV antibiotics with Zyvox. Infectious disease input appreciated.  Mental status appears back to baseline now that sepsis is better controlled.  However he has persistently positive blood cultures.  Plan for 2D echo  on Monday.  Will hold off on inserting PICC line for now until blood cultures are clear.    Podiatry consultation input appreciated.  Will follow recommendations.  Continue wound dressings as per podiatry Follow-up repeat blood cultures.    Nephrology and urology input appreciated.  Hold Bumex due to elevated creatinine and BUN.  No signs of urinary retention at this time.    Pain control with opiate pain medications.    Insulin sliding scale and Levemir for type 2 diabetes mellitus    DVT prophylaxis with Eliquis    PT/OT/wound care consultations    DVT prophylaxis with Eliquis    CODE STATUS is full code    Medical Decision Making  Number and Complexity of problems: 4 acute, severe, high complexity medical problems.  Multiple chronic medical problems.  Differential Diagnosis: None    Conditions and Status        Condition is worsening.     MDM Data  External documents reviewed: None  Cardiac tracing (EKG, telemetry) interpretation: None  Radiology interpretation: None  Labs reviewed: CBC, BMP, blood cultures reviewed by me  Any tests that were considered but not ordered: None     Decision rules/scores evaluated (example [id number], Wells, etc): N/A     Discussed with: Patient     Care Planning  Shared decision making: Patient and his wife  Code status and discussions: CODE STATUS is full code    Disposition  Social Determinants of Health that impact treatment or disposition: None  I expect the patient to be discharged to home in 3 to 5 days.     Electronically signed by Latanya Paez MD, 04/01/24, 09:20 CDT.

## 2024-04-01 NOTE — PLAN OF CARE
Goal Outcome Evaluation:  Plan of Care Reviewed With: patient        Progress: improving  Outcome Evaluation: pt in chair, dong dhaliwal boot, trans sit-stand cga, pt amb 5 feet rwx cga chair-bed, BLE AROM 10 x 2 reps, trans back to bed min assist

## 2024-04-01 NOTE — PROGRESS NOTES
Nephrology ([facility]) Progress Note      Patient:  rEick Luong  YOB: 1955  Date of Service: 4/1/2024  MRN: [id number]   Acct: [id number]   Primary Care Physician: Del Shetty MD  Advance Directive:   Code Status and Medical Interventions:   Ordered at: 03/26/24 1815     Level Of Support Discussed With:    Health Care Surrogate     Code Status (Patient has no pulse and is not breathing):    CPR (Attempt to Resuscitate)     Medical Interventions (Patient has pulse or is breathing):    Full Support     Admit Date: 3/26/2024       Hospital Day: 6  Referring Provider: No ref. provider found      Patient personally seen and examined.  Complete chart including Consults, Notes, Operative Reports, Labs, Cardiology, and Radiology studies reviewed as able.        Subjective:  Erick Luong is a 68 y.o. male for whom we were consulted for evaluation and treatment of acute kidney injury.  He has stage IIIb chronic kidney disease baseline, follows with Dr Lomas in our office.  Baseline creatinine approximately 1.8. He has history of insulin-dependent type 2 diabetes, hypertension, abdominal obesity, obstructive sleep apnea, diabetic foot ulcer and coronary artery disease.   Patient presented to ER on 3/26 with altered mental status. Had debridement of ongoing wound on left foot the day prior. Left foot warm and erythremic on arrival to ER. Noted to have elevated blood glucose over 400. Initial creatinine of 1.9.  Admitted to medical floor for sepsis due to left foot wound. Patient has been agitated and confused during the admission, requiring wrist restraints due to pulling at lines and tubes.     Today is awake and alert. No new complaint or overnight issues. Dizzy earlier when working with PT but none at time of exam. Urine output nonoliguric     Allergies:  Cefepime, Bactrim [sulfamethoxazole-trimethoprim], Vancomycin, Zolpidem, Zolpidem tartrate, and Metronidazole    Home  Meds:  Medications Prior to Admission   Medication Sig Dispense Refill Last Dose    amitriptyline (ELAVIL) 25 MG tablet Take 2 tablets by mouth Daily at bedtime. (Patient not taking: Reported on 3/27/2024) 60 tablet 1 Not Taking    apixaban (ELIQUIS) 5 MG tablet tablet Take 1 tablet by mouth Every 12 (Twelve) Hours.       ascorbic acid (VITAMIN C) 1000 MG tablet Take 1 tablet by mouth Daily. 30 tablet 3     Aspirin 81 MG capsule Take 81 mg by mouth Daily.       bumetanide (BUMEX) 1 MG tablet Take 1 tablet every day by oral route for 30 days. 30 tablet 0     bumetanide (BUMEX) 2 MG tablet Take 1 tablet by mouth Daily. Take 2 tablets in morning;1 tablet at night (Patient not taking: Reported on 3/27/2024)   Not Taking    busPIRone (BUSPAR) 10 MG tablet Take 1 tablet by mouth 3 (Three) Times a Day.       calcitriol (ROCALTROL) 0.5 MCG capsule Take 1 capsule by mouth Daily. 90 capsule 4     calcitriol (ROCALTROL) 0.5 MCG capsule Take 1 capsule every day by oral route for 90 days. (Patient not taking: Reported on 3/27/2024) 90 capsule 4 Not Taking    carvedilol (COREG) 3.125 MG tablet Take 1 tablet twice a day by oral route. 60 tablet 4     carvedilol (COREG) 6.25 MG tablet Take 1 tablet by mouth 2 (Two) Times a Day. (Patient not taking: Reported on 3/27/2024) 180 tablet 4 Not Taking    Cholecalciferol (D-5000) 125 MCG (5000 UT) tablet Take 1 tablet by mouth Daily Before Lunch. 30 tablet 2     cloNIDine (CATAPRES) 0.1 MG tablet Take 1 tablet by mouth Every 12 (Twelve) Hours. (Patient not taking: Reported on 3/27/2024) 60 tablet 2 Not Taking    Diclofenac Sodium (VOLTAREN) 1 % gel gel Apply 2 g topically to the appropriate area as directed 4 (Four) Times a Day As Needed. 300 g 11     Diclofenac Sodium (VOLTAREN) 1 % gel gel Apply 2 g topically to the appropriate area as directed 4 (Four) Times a Day. (Patient not taking: Reported on 3/27/2024) 300 g 11 Not Taking    donepezil (ARICEPT) 10 MG tablet Take 1 tablet by mouth  Daily. (Patient not taking: Reported on 3/27/2024) 90 tablet 4 Not Taking    Dulaglutide (Trulicity) 4.5 MG/0.5ML solution pen-injector Inject 0.5 mL under the skin into the appropriate area as directed 1 (One) Time Per Week. (Patient not taking: Reported on 3/27/2024) 2 mL 11 Not Taking    DULoxetine (CYMBALTA) 60 MG capsule Take 1 capsule by mouth Daily. 90 capsule 4     DULoxetine (CYMBALTA) 60 MG capsule Take 1 capsule by mouth Daily. (Patient not taking: Reported on 3/27/2024) 90 capsule 4 Not Taking    DULoxetine (CYMBALTA) 60 MG capsule Take 1 capsule by mouth Daily. (Patient not taking: Reported on 3/27/2024) 90 capsule 4 Not Taking    empagliflozin (JARDIANCE) 25 MG tablet tablet Take 1 tablet by mouth Daily. (Patient not taking: Reported on 3/27/2024) 30 tablet 2 Not Taking    famotidine (PEPCID) 20 MG tablet Take 1 tablet twice a day by oral route. 180 tablet 4     fluticasone (FLONASE) 50 MCG/ACT nasal spray 1 spray into the nostril(s) as directed by provider Daily. (Patient taking differently: 1 spray into the nostril(s) as directed by provider 2 (Two) Times a Day.) 16 g 1     HYDROcodone-acetaminophen (NORCO) 7.5-325 MG per tablet Take 1 tablet by mouth 2 (Two) Times a Day As Needed. (Patient not taking: Reported on 3/27/2024) 40 tablet 0 Not Taking    Insulin Glargine (Lantus SoloStar) 100 UNIT/ML injection pen Inject 20 Units under the skin into the appropriate area as directed Every Evening. 15 mL 3     Insulin Regular Human, Conc, (HumuLIN R U-500 KwikPen) 500 UNIT/ML solution pen-injector CONCENTRATED injection Inject 120 Units under the skin into the appropriate area as directed 2 (Two) Times a Day with breakfast and dinner. (Patient not taking: Reported on 3/27/2024) 18 mL 5 Not Taking    Insulin Regular Human, Conc, (HumuLIN R U-500 KwikPen) 500 UNIT/ML solution pen-injector CONCENTRATED injection Inject 40 Units under the skin into the appropriate area with regular meals AND 40 Units with  large meals. 54 mL 3     isosorbide mononitrate (IMDUR) 30 MG 24 hr tablet Take 1 tablet by mouth Daily.       magnesium hydroxide (Milk of Magnesia) 400 MG/5ML suspension Take 30 mL every day by oral route for 30 days. 900 mL 0     magnesium hydroxide (Milk of Magnesia) 400 MG/5ML suspension Take 30mL by mouth once daily for 30 days. (Patient not taking: Reported on 3/27/2024) 900 mL 0 Not Taking    melatonin 3 MG tablet Take 1 tablet by mouth At Night As Needed for Sleep.       methylcellulose (Citrucel) oral powder Mix 5g as directed and drink twice daily for 90 days. 900 g 10     metoclopramide (REGLAN) 5 MG tablet Take 1 tablet by mouth 3 times a day.       midodrine (PROAMATINE) 2.5 MG tablet Take 1 tablet by mouth 3 (Three) Times a Day Before Meals.       nitroglycerin (NITROSTAT) 0.4 MG SL tablet Dissolve 1 tablet by mouth every 5 minutes as needed for chest pain; MAX 3 tabs/24 hours.  If no improvement after 3 tabs go to ER 25 tablet 0     ondansetron (ZOFRAN) 4 MG tablet Take 1 tablet by mouth Every 6 (Six) Hours As Needed for Nausea or Vomiting.       oxyCODONE (ROXICODONE) 10 MG tablet Take 1 tablet by mouth twice a day as needed 60 tablet 0     pantoprazole (Protonix) 40 MG EC tablet Take 1 tablet by mouth 2 (Two) Times a Day. (Patient not taking: Reported on 3/27/2024) 180 tablet 4 Not Taking    polyethylene glycol (MIRALAX) 17 g packet Take 17 g by mouth Daily. Obtain OTC       prazosin (MINIPRESS) 1 MG capsule Take 1 capsule by mouth every night at bedtime. 30 capsule 2     pregabalin (LYRICA) 100 MG capsule Take 2 capsules by mouth Every Night.       pregabalin (LYRICA) 50 MG capsule Take 1 capsule by mouth Daily.       rosuvastatin (CRESTOR) 10 MG tablet Take 1 tablet by mouth Daily.       sennosides-docusate (PERICOLACE) 8.6-50 MG per tablet Take 1 tablet by mouth Every Night. Obtain OTC       sennosides-docusate (PERICOLACE) 8.6-50 MG per tablet Take 1 tablet by mouth every night at bedtime.  (Patient not taking: Reported on 3/27/2024) 30 tablet 2 Not Taking    sodium hypochlorite (DAKIN'S 1/4 STRENGTH) 0.125 % solution topical solution 0.125% Apply to affected area twice daily (Patient not taking: Reported on 3/27/2024) 473 mL 3 Not Taking    sodium hypochlorite (DAKIN'S 1/4 STRENGTH) 0.125 % solution topical solution 0.125% Apply to affected area twice daily as directed (Patient not taking: Reported on 3/27/2024) 473 mL 5 Not Taking    sodium hypochlorite (DAKIN'S) 0.25 % topical solution Use to wound daily as instructed 473 mL 1     sucralfate (CARAFATE) 1 g tablet Take 1 tablet by mouth 3 times a day.       tamsulosin (FLOMAX) 0.4 MG capsule 24 hr capsule Take 1 capsule by mouth Daily. 90 capsule 1     timolol (TIMOPTIC) 0.5 % ophthalmic solution Administer 1 drop to both eyes 2 (Two) Times a Day.       valsartan (DIOVAN) 40 MG tablet Take 1 tablet by mouth Daily.       zinc sulfate (ZINCATE) 50 MG capsule Take 1 capsule by mouth Daily.          Medicines:  Current Facility-Administered Medications   Medication Dose Route Frequency Provider Last Rate Last Admin    acetaminophen (TYLENOL) tablet 650 mg  650 mg Oral Q4H PRN Raquel Duncan APRN   650 mg at 04/01/24 0446    Or    acetaminophen (TYLENOL) 160 MG/5ML oral solution 650 mg  650 mg Oral Q4H PRN Raquel Duncan APRN   650 mg at 03/27/24 2109    Or    acetaminophen (TYLENOL) suppository 650 mg  650 mg Rectal Q4H PRN Raquel Duncan APRN        apixaban (ELIQUIS) tablet 5 mg  5 mg Oral Q12H Raquel Duncan APRN   5 mg at 04/01/24 0802    ascorbic acid (VITAMIN C) tablet 1,000 mg  1,000 mg Oral Daily Rene Maloney MD   1,000 mg at 04/01/24 0804    aspirin EC tablet 81 mg  81 mg Oral Daily Rene Maloney MD   81 mg at 04/01/24 0802    sennosides-docusate (PERICOLACE) 8.6-50 MG per tablet 1 tablet  1 tablet Oral BID Duncan, Raquel D, APRN   1 tablet at 04/01/24 0801    And    polyethylene glycol (MIRALAX) packet 17 g  17 g  Oral Daily PRN Raquel Duncan APRN        And    bisacodyl (DULCOLAX) EC tablet 5 mg  5 mg Oral Daily PRN Raquel Duncan APRN        And    bisacodyl (DULCOLAX) suppository 10 mg  10 mg Rectal Daily PRN Raquel Duncan APRN        calcitriol (ROCALTROL) capsule 0.5 mcg  0.5 mcg Oral Daily Rene Maloney MD   0.5 mcg at 04/01/24 0804    carvedilol (COREG) tablet 3.125 mg  3.125 mg Oral BID With Meals Rene Maloney MD   3.125 mg at 04/01/24 0803    dextrose (D50W) (25 g/50 mL) IV injection 25 g  25 g Intravenous Q15 Min PRN Raquel Duncan APRN        dextrose (GLUTOSE) oral gel 15 g  15 g Oral Q15 Min PRN Raquel Duncan APRN   15 g at 03/27/24 1710    Diclofenac Sodium (VOLTAREN) 1 % gel 2 g  2 g Topical 4x Daily PRN Rene Maloney MD        dicyclomine (BENTYL) capsule 20 mg  20 mg Oral TID PRN Rene Maloney MD   20 mg at 03/31/24 1603    donepezil (ARICEPT) tablet 10 mg  10 mg Oral Daily Raquel Duncan APRN   10 mg at 04/01/24 0802    famotidine (PEPCID) tablet 20 mg  20 mg Oral Daily Rene Maloney MD   20 mg at 04/01/24 0803    glucagon (GLUCAGEN) injection 1 mg  1 mg Intramuscular Q15 Min PRN Raquel Duncan APRN        haloperidol lactate (HALDOL) injection 5 mg  5 mg Intravenous Q6H PRN Rene Maloney MD        insulin detemir (LEVEMIR) injection 30 Units  30 Units Subcutaneous Nightly Raquel Duncan APRN   30 Units at 03/31/24 2023    Insulin Lispro (humaLOG) injection 4-24 Units  4-24 Units Subcutaneous 4x Daily AC & at Bedtime Raquel Duncan APRN   8 Units at 04/01/24 1124    insulin regular (humuLIN R,novoLIN R) injection 10 Units  10 Units Subcutaneous TID AC De Jesus, Steve Josea Rmando, MD   10 Units at 04/01/24 1124    isosorbide mononitrate (IMDUR) 24 hr tablet 30 mg  30 mg Oral Q24H Rene Maloney MD   30 mg at 04/01/24 0804    Linezolid (ZYVOX) 600 mg 300 mL  600 mg Intravenous Q12H Julia Adrian  mL/hr at 04/01/24 1113  600 mg at 04/01/24 1113    LORazepam (ATIVAN) injection 1 mg  1 mg Intravenous Q4H PRN Rene Maloney MD   1 mg at 03/29/24 0140    magnesium hydroxide (MILK OF MAGNESIA) suspension 10 mL  10 mL Oral Daily PRN Rene Maloney MD   10 mL at 03/31/24 1202    melatonin tablet 6 mg  6 mg Oral Nightly Rene Maloney MD   6 mg at 03/31/24 2024    midodrine (PROAMATINE) tablet 2.5 mg  2.5 mg Oral TID AC Rene Maloney MD   2.5 mg at 04/01/24 1113    Morphine sulfate (PF) injection 2 mg  2 mg Intravenous Q3H PRN Rene Maloney MD   2 mg at 03/31/24 1501    mupirocin (BACTROBAN) 2 % nasal ointment 1 Application  1 Application Each Nare BID Nadia Polo K, APRN   1 Application at 04/01/24 0802    nitroglycerin (NITROSTAT) SL tablet 0.4 mg  0.4 mg Sublingual Q5 Min PRN Raquel Duncan, APRN        ondansetron ODT (ZOFRAN-ODT) disintegrating tablet 4 mg  4 mg Oral Q6H PRN Raquel Duncan, APRN   4 mg at 03/30/24 1748    Or    ondansetron (ZOFRAN) injection 4 mg  4 mg Intravenous Q6H PRN Raquel Duncan, APRN   4 mg at 03/29/24 1837    oxyCODONE (ROXICODONE) immediate release tablet 10 mg  10 mg Oral BID PRN Raquel Duncan, APRN   10 mg at 03/31/24 2036    pregabalin (LYRICA) capsule 100 mg  100 mg Oral Nightly Rene Maloney MD   100 mg at 03/31/24 2023    pregabalin (LYRICA) capsule 50 mg  50 mg Oral Daily Rene Maloney MD   50 mg at 04/01/24 0801    rosuvastatin (CRESTOR) tablet 10 mg  10 mg Oral Nightly Rene Maloney MD   10 mg at 03/31/24 2024    sodium chloride 0.9 % flush 10 mL  10 mL Intravenous PRN Steve De Jesus MD        sodium chloride 0.9 % flush 10 mL  10 mL Intravenous Q12H Raquel Duncan APRN   10 mL at 04/01/24 0805    sodium chloride 0.9 % flush 10 mL  10 mL Intravenous PRN Raquel Duncan APRN        sodium chloride 0.9 % infusion 40 mL  40 mL Intravenous PRN Raquel Duncan APRN        sucralfate (CARAFATE) 1 GM/10ML suspension 1 g  1 g  Oral TID AC Rene Maloney MD   1 g at 04/01/24 1113    tamsulosin (FLOMAX) 24 hr capsule 0.4 mg  0.4 mg Oral Daily Rene Maloney MD   0.4 mg at 04/01/24 0804    timolol (TIMOPTIC) 0.25 % ophthalmic solution 1 drop  1 drop Both Eyes Q12H Rene Maloney MD   1 drop at 04/01/24 0804    vitamin D3 capsule 5,000 Units  5,000 Units Oral Daily Rene Maloney MD   5,000 Units at 04/01/24 0802    zinc sulfate (ZINCATE) capsule 220 mg  220 mg Oral Daily Rene Maloney MD   220 mg at 04/01/24 0802       Past Medical History:  Past Medical History:   Diagnosis Date    Arthritis     Autonomic disease     CAD (coronary artery disease) 02/06/2017    Cervical radiculopathy 09/16/2021    Chronic constipation with acute exaccerbation 05/10/2021    Coronary artery disease     Degeneration of cervical intervertebral disc 08/11/2021    Diabetes mellitus     Diabetic foot ulcer 08/31/2020    Diabetic polyneuropathy associated with type 2 diabetes mellitus 01/18/2021    Elevated cholesterol     Gastroesophageal reflux disease 05/13/2019    Headache     HTN (hypertension), benign 05/03/2017    Hyperlipidemia     Hypertension     Mixed hyperlipidemia 02/07/2017    Multiple lung nodules 01/26/2020    5mm, 9 mm RLL identified 1/2020, not present 10/2019.    Myocardial infarction     Osteomyelitis 01/22/2020    Osteomyelitis of fifth toe of right foot 10/07/2019    Pancreatitis     Persistent insomnia 01/20/2020    Renal disorder     Sleep apnea 02/06/2017    Sleep apnea with use of continuous positive airway pressure (CPAP)     NON-COMPLIANT    Slow transit constipation 01/16/2019    Spinal stenosis in cervical region 09/16/2021    Vitamin D deficiency 03/02/2021       Past Surgical History:  Past Surgical History:   Procedure Laterality Date    ABDOMINAL SURGERY      AMPUTATION FOOT / TOE Left 10/2021    5th digit     ANTERIOR CERVICAL DISCECTOMY W/ FUSION N/A 8/5/2022    Procedure: CERVICAL DISCECTOMY ANTERIOR  WITH FUSION C5-6 with possible plating of C5-7 with neuromonitoring and 1 c-arm;  Surgeon: Karel Soliz MD;  Location:  [facility] OR;  Service: Neurosurgery;  Laterality: N/A;    APPENDECTOMY      BACK SURGERY      CARDIAC CATHETERIZATION Left 2021    Procedure: Left Heart Cath w poss intervention left anatomical snuff box acess;  Surgeon: Omkar Charles DO;  Location:  [facility] CATH INVASIVE LOCATION;  Service: Cardiology;  Laterality: Left;    CARDIAC SURGERY      CATARACT EXTRACTION      CERVICAL SPINE SURGERY      COLONOSCOPY N/A 2017    Normal exam repeat in 5 years    COLONOSCOPY N/A 2019    Mild acute inflammation    COLONOSCOPY W/ POLYPECTOMY  2014    Hyperplastic polyp    CORONARY ARTERY BYPASS GRAFT  10/2015    ENDOSCOPY  2011    Gastritis with hemorrhage    ENDOSCOPY N/A 2017    Normal exam    ENDOSCOPY N/A 2019    Gastritis    ENDOSCOPY N/A 2020    Non-erosive gastritis with hemorrhage    ENDOSCOPY N/A 02/10/2021    Esophagitis    FOOT SURGERY Left     INCISION AND DRAINAGE OF WOUND Left 2007    spider bite       Family History  Family History   Problem Relation Age of Onset    Colon cancer Father     Heart disease Father     Colon cancer Sister     Colon polyps Sister     Alzheimer's disease Mother     Coronary artery disease Sister     Coronary artery disease Sister        Social History  Social History     Socioeconomic History    Marital status:    Tobacco Use    Smoking status: Former     Current packs/day: 0.00     Types: Cigarettes     Quit date:      Years since quittin.2    Smokeless tobacco: Never    Tobacco comments:     smoked in highschool   Vaping Use    Vaping status: Never Used   Substance and Sexual Activity    Alcohol use: No    Drug use: No    Sexual activity: Defer       Review of Systems:  History obtained from chart review and the patient  General ROS: No fever or chills  Respiratory ROS: No cough,  shortness of breath, wheezing  Cardiovascular ROS: No chest pain or palpitations  Gastrointestinal ROS: No abdominal pain or melena  Genito-Urinary ROS: No dysuria or hematuria  Psych ROS: No anxiety and depression  14 point ROS reviewed with the patient and negative except as noted above and in the HPI unless unable to obtain.    Objective:  Patient Vitals for the past 24 hrs:   BP Temp Temp src Pulse Resp SpO2   04/01/24 0804 115/52 97.6 °F (36.4 °C) Oral 59 16 100 %   04/01/24 0404 127/50 97.4 °F (36.3 °C) Oral 60 18 98 %   03/31/24 2034 117/56 96.9 °F (36.1 °C) Axillary 65 16 99 %   03/31/24 1558 121/63 97.9 °F (36.6 °C) Axillary 70 16 95 %   03/31/24 1154 114/50 98.4 °F (36.9 °C) Axillary 67 16 95 %       Intake/Output Summary (Last 24 hours) at 4/1/2024 1125  Last data filed at 3/31/2024 1400  Gross per 24 hour   Intake --   Output 400 ml   Net -400 ml     General: awake/alert   Chest:  clear to auscultation bilaterally without respiratory distress  CVS: regular rate and rhythm  Abdominal: soft, nontender, positive bowel sounds  Extremities:  bilateral 1+ edema  Skin: warm and dry without rash      Labs:  Results from last 7 days   Lab Units 04/01/24 0402 03/31/24  0526 03/30/24  0620   WBC 10*3/mm3 5.15 5.01 5.69   HEMOGLOBIN g/dL 9.9* 10.3* 10.0*   HEMATOCRIT % 30.6* 31.6* 30.6*   PLATELETS 10*3/mm3 162 149 155         Results from last 7 days   Lab Units 04/01/24 0402 03/31/24  0526 03/30/24  0620 03/29/24  0551   SODIUM mmol/L 135* 136 135* 138   POTASSIUM mmol/L 4.1 3.6 3.5 3.3*   CHLORIDE mmol/L 101 101 100 103   CO2 mmol/L 23.0 23.0 24.0 23.0   BUN mg/dL 46* 51* 51* 40*   CREATININE mg/dL 2.18* 2.52* 2.74* 2.07*   CALCIUM mg/dL 9.1 8.6 8.2* 8.4*   EGFR mL/min/1.73 32.4* 27.2* 24.6* 34.5*   BILIRUBIN mg/dL  --  0.5 0.5 0.7   ALK PHOS U/L  --  110 95 81   ALT (SGPT) U/L  --  19 19 17   AST (SGOT) U/L  --  30 32 32   GLUCOSE mg/dL 292* 201* 104* 120*       Radiology:   Imaging Results (Last 72 Hours)        Procedure Component Value Units Date/Time    MRI Foot Left With & Without Contrast [[id number]] Collected: 03/29/24 1856     Updated: 03/29/24 1901    Narrative:      EXAMINATION: MRI FOOT LEFT W WO CONTRAST-     3/29/2024 4:30 PM     HISTORY: Foot swelling, diabetic, osteomyelitis suspected, xray done;  E11.628-Type 2 diabetes mellitus with other skin complications;  L08.9-Local infection of the skin and subcutaneous tissue, unspecified;  E11.65-Type 2 diabetes mellitus with hyperglycemia; Z74.09-Other reduced  mobility     LEFT foot MRI without and with IV gadolinium contrast.  Axial, sagittal, and coronal sequences.     COMPARISON:  LEFT foot x-rays from 3/26/2024.     There is a tiny metal foreign body within the plantar soft tissues  adjacent to the second toe and despite the small size it produces a  prominent amount of artifact related to image distortion.     I see no soft tissue abscess.     No discrete bone edema or bone enhancement is seen to indicate  osteomyelitis.       Impression:      1. There are some limitations to the study based on metal related  artifact.  2. No soft tissue abscess or definite bone marrow edema or enhancement  is seen to indicate osteomyelitis.           This report was signed and finalized on 3/29/2024 6:58 PM by Dr. Gomez Mcgill MD.               Culture:  Blood Culture   Date Value Ref Range Status   03/26/2024 Staphylococcus aureus, MRSA (C)  Final     Comment:       Infectious disease consultation is highly recommended to rule out distant foci of infection.  Methicillin resistant Staphylococcus aureus, Patient may be an isolation risk.   03/26/2024 Staphylococcus aureus, MRSA (C)  Final     Comment:       Infectious disease consultation is highly recommended to rule out distant foci of infection.  Methicillin resistant Staphylococcus aureus, Patient may be an isolation risk.         Assessment    Acute kidney injury, ATN--improving  Baseline chronic kidney disease  stage 3b  Type 2 diabetes, poorly controlled (A1c 10.8%)  Hypertension   Metabolic encephalopathy--improving  Anemia of CKD  Hypokalemia--improved  Sepsis due to infection of left foot wound    Plan:   Bumex on hold.  Monitor labs      Stewart Alvarez, APRN  4/1/2024  11:25 CDT

## 2024-04-01 NOTE — THERAPY TREATMENT NOTE
Acute Care - Physical Therapy Treatment Note  [facility]     Patient Name: Erick Luong  : 1955  MRN: [id number]  Today's Date: 2024      Visit Dx:     ICD-10-CM ICD-9-CM   1. Diabetic foot infection  E11.628 250.80    L08.9 686.9   2. Poorly controlled diabetes mellitus  E11.65 250.00   3. Impaired functional mobility and activity tolerance [Z74.09]  Z74.09 V49.89     Patient Active Problem List   Diagnosis    Obesity, unspecified obesity severity, unspecified obesity type    Essential hypertension    Type 2 diabetes mellitus with hyperglycemia, with long-term current use of insulin    Nonsmoker    Anemia due to chronic kidney disease    Class 3 severe obesity due to excess calories with body mass index (BMI) of 40.0 to 44.9 in adult    Anasarca    Sleep apnea with use of continuous positive airway pressure (CPAP)    Medically noncompliant    Diabetic ulcer of left midfoot associated with type 2 diabetes mellitus, with fat layer exposed    Diabetic polyneuropathy associated with type 2 diabetes mellitus    Spinal stenosis in cervical region    Degeneration of cervical intervertebral disc    Cervical radiculopathy    Degeneration of lumbar or lumbosacral intervertebral disc    Cervical myelopathy    Bilateral carpal tunnel syndrome    CAD (coronary artery disease)    GERD without esophagitis    BPH without obstruction/lower urinary tract symptoms    Stage 3b chronic kidney disease    Chronic diastolic heart failure    Type 2 myocardial infarction due to heart failure    Left carpal tunnel syndrome    Syncope and collapse, recurrent episodes    Poorly-controlled hypertension    Rhinovirus    Peripheral vascular disease    Chronic kidney disease (CKD), stage IV (severe)    Diabetic foot infection    Sepsis     Past Medical History:   Diagnosis Date    Arthritis     Autonomic disease     CAD (coronary artery disease) 2017    Cervical radiculopathy 2021    Chronic constipation with acute  exaccerbation 05/10/2021    Coronary artery disease     Degeneration of cervical intervertebral disc 08/11/2021    Diabetes mellitus     Diabetic foot ulcer 08/31/2020    Diabetic polyneuropathy associated with type 2 diabetes mellitus 01/18/2021    Elevated cholesterol     Gastroesophageal reflux disease 05/13/2019    Headache     HTN (hypertension), benign 05/03/2017    Hyperlipidemia     Hypertension     Mixed hyperlipidemia 02/07/2017    Multiple lung nodules 01/26/2020    5mm, 9 mm RLL identified 1/2020, not present 10/2019.    Myocardial infarction     Osteomyelitis 01/22/2020    Osteomyelitis of fifth toe of right foot 10/07/2019    Pancreatitis     Persistent insomnia 01/20/2020    Renal disorder     Sleep apnea 02/06/2017    Sleep apnea with use of continuous positive airway pressure (CPAP)     NON-COMPLIANT    Slow transit constipation 01/16/2019    Spinal stenosis in cervical region 09/16/2021    Vitamin D deficiency 03/02/2021     Past Surgical History:   Procedure Laterality Date    ABDOMINAL SURGERY      AMPUTATION FOOT / TOE Left 10/2021    5th digit     ANTERIOR CERVICAL DISCECTOMY W/ FUSION N/A 8/5/2022    Procedure: CERVICAL DISCECTOMY ANTERIOR WITH FUSION C5-6 with possible plating of C5-7 with neuromonitoring and 1 c-arm;  Surgeon: Karel Soliz MD;  Location:  [facility] OR;  Service: Neurosurgery;  Laterality: N/A;    APPENDECTOMY      BACK SURGERY      CARDIAC CATHETERIZATION Left 02/08/2021    Procedure: Left Heart Cath w poss intervention left anatomical snuff box acess;  Surgeon: Omkar Charles DO;  Location:  [facility] CATH INVASIVE LOCATION;  Service: Cardiology;  Laterality: Left;    CARDIAC SURGERY      CATARACT EXTRACTION      CERVICAL SPINE SURGERY      COLONOSCOPY N/A 01/31/2017    Normal exam repeat in 5 years    COLONOSCOPY N/A 02/11/2019    Mild acute inflammation    COLONOSCOPY W/ POLYPECTOMY  03/04/2014    Hyperplastic polyp    CORONARY ARTERY BYPASS GRAFT  10/2015     ENDOSCOPY  04/13/2011    Gastritis with hemorrhage    ENDOSCOPY N/A 05/05/2017    Normal exam    ENDOSCOPY N/A 02/11/2019    Gastritis    ENDOSCOPY N/A 09/01/2020    Non-erosive gastritis with hemorrhage    ENDOSCOPY N/A 02/10/2021    Esophagitis    FOOT SURGERY Left     INCISION AND DRAINAGE OF WOUND Left 09/2007    spider bite     PT Assessment (Last 12 Hours)       PT Evaluation and Treatment       [facility] Name 04/01/24 1254 04/01/24 1127       Physical Therapy Time and Intention    Subjective Information complains of;pain;nausea/vomiting  - --  -    Document Type therapy note (daily note)  - --    Mode of Treatment physical therapy  - --    Session Not Performed -- other (see comments)  -    Comment, Session Not Performed -- RN working on IV  -    Patient Effort good  - --      [facility] Name 04/01/24 1254          General Information    Existing Precautions/Restrictions fall  cam boot  on LLE with transfers or walking; WBAT  -AH       Row Name 04/01/24 1254          Pain    Pretreatment Pain Rating 7/10  -     Posttreatment Pain Rating 7/10  -     Pain Location - abdomen  -     Pain Intervention(s) Repositioned;Medication (See MAR)  -AH       Row Name 04/01/24 1254          Mobility    Extremity Weight-bearing Status left lower extremity  -     Left Lower Extremity (Weight-bearing Status) weight-bearing as tolerated (WBAT)  with cam boot on per Dr. Cerrato  -AH       Row Name 04/01/24 1254          Bed Mobility    Supine-Sit [place] (Bed Mobility) --  [facility]  -     Sit-Supine [place] (Bed Mobility) minimum assist (75% patient effort);verbal cues  -AH       Row Name 04/01/24 1254          Sit-Stand Transfer    Sit-Stand [place] (Transfers) contact guard;verbal cues  -AH       Row Name 04/01/24 1254          Stand-Sit Transfer    Stand-Sit [place] (Transfers) contact guard;verbal cues  -[facility] Name 04/01/24 1254          Gait/Stairs (Locomotion)    [place] Level (Gait)  verbal cues;contact guard  -     Assistive Device (Gait) walker, front-wheeled  -     Distance in Feet (Gait) 5  -AH       Row Name 04/01/24 1254          Motor Skills    Comments, Therapeutic Exercise BLE AROM 10 x 2 reps  -     Additional Documentation Comments, Therapeutic Exercise (Row)  -       Row Name             Wound 06/15/23 0102 Left anterior plantar    Wound - Properties Group Placement Date: 06/15/23  -SM Placement Time: 0102 -SM Side: Left  -SM Orientation: anterior  -SM Location: plantar  -SM    Retired Wound - Properties Group Placement Date: 06/15/23  -SM Placement Time: 0102  -SM Side: Left  -SM Orientation: anterior  -SM Location: plantar  -SM    Retired Wound - Properties Group Date first assessed: 06/15/23  -SM Time first assessed: 0102 -SM Side: Left  -SM Location: plantar  -SM      Row Name             Wound 08/22/23 0140 Left posterior plantar    Wound - Properties Group Placement Date: 08/22/23  -AM Placement Time: 0140  -AM Present on Original Admission: Y  -AM Side: Left  -AM Orientation: posterior  -AM Location: plantar  -AM    Retired Wound - Properties Group Placement Date: 08/22/23  -AM Placement Time: 0140  -AM Present on Original Admission: Y  -AM Side: Left  -AM Orientation: posterior  -AM Location: plantar  -AM    Retired Wound - Properties Group Date first assessed: 08/22/23  -AM Time first assessed: 0140  -AM Present on Original Admission: Y  -AM Side: Left  -AM Location: plantar  -AM      Row Name             Wound Left lateral foot Diabetic Ulcer    Wound - Properties Group Side: Left  -MH Orientation: lateral  -MH Location: foot  -JACIEL, 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;diabetic foot ulcer/gangrene  Primary Wound Type: Diabetic ulc  -JACIEL Wound Outcome: Amputation  -JACIEL Stage, Pressure Injury : other (see comments)  -JACIEL, full thickness starting 10/20/21     Retired Wound - Properties Group Side: Left  -MH Orientation: lateral  -MH Location: foot  -JACIEL, left 5th toe  amputation;diabetic foot ulcer/gangrene  Primary Wound Type: Diabetic ulc  -JACIEL Stage, Pressure Injury : other (see comments)  -JACIEL, full thickness starting 10/20/21  Wound Outcome: Amputation  -JACIEL    Retired Wound - Properties Group Side: Left  - Location: foot  -JACIEL, 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;diabetic foot ulcer/gangrene  Primary Wound Type: Diabetic ulc  -JACIEL Wound Outcome: Amputation  -JACIEL      Row Name 04/01/24 1254          Plan of Care Review    Plan of Care Reviewed With patient  -     Progress improving  -     Outcome Evaluation pt in chair, dong L cam boot, trans sit-stand cga, pt amb 5 feet rwx cga chair-bed, BLE AROM 10 x 2 reps, trans back to bed min assist  -       Row Name 04/01/24 1254          Positioning and Restraints    Pre-Treatment Position sitting in chair/recliner  -     Post Treatment Position bed  -     In Bed fowlers;call light within reach;encouraged to call for assist;exit alarm on  [facility]               User Key  (r) = Recorded By, (t) = Taken By, (c) = Cosigned By      Initials Name Provider Type     Chery Rosado, PTA Physical Therapist Assistant    Yuliana Lisa RN Registered Nurse    MH Haase, Mallory L, RN Registered Nurse    Faviola Kunz RN Registered Nurse    Michelle Diaz RN Registered Nurse                    Physical Therapy Education       Title: PT OT SLP Therapies (In Progress)       Topic: Physical Therapy (In Progress)       Point: Mobility training (Done)       Learning Progress Summary             Patient Acceptance, E,TB,D, VU,NR by  at 3/29/2024 1009    Comment: education re: purpose of PT/importance of activity, safety/falls prevention, NWB L LE, improved tech w/ bed mobility, positioning for pressure relief                         Point: Home exercise program (Not Started)       Learner Progress:  Not documented in this visit.              Point: Precautions (Done)       Learning Progress Summary             Patient Acceptance,  E,TB,D, VU,NR by NIKO at 3/29/2024 1009    Comment: education re: purpose of PT/importance of activity, safety/falls prevention, NWB L LE, improved tech w/ bed mobility, positioning for pressure relief                                         User Key       Initials Effective Dates Name Provider Type Discipline     08/02/18 -  Melly Mustafa, PT Physical Therapist PT                  PT Recommendation and Plan     Plan of Care Reviewed With: patient  Progress: improving  Outcome Evaluation: pt in chair, dong L cam boot, trans sit-stand cga, pt amb 5 feet rwx cga chair-bed, BLE AROM 10 x 2 reps, trans back to bed min assist   Outcome Measures       Row Name 04/01/24 1300 03/30/24 1300 03/29/24 1400       How much help from another person do you currently need...    Turning from your back to your side while in flat bed without using bedrails? 4  -AH 4  -KJ --    Moving from lying on back to sitting on the side of a flat bed without bedrails? 3  -AH 4  -KJ --    Moving to and from a bed to a chair (including a wheelchair)? 3  -AH 3  -KJ --    Standing up from a chair using your arms (e.g., wheelchair, bedside chair)? 3  -AH 3  -KJ --    Climbing 3-5 steps with a railing? 2  -AH 3  -KJ --    To walk in hospital room? 3  -AH 3  -KJ --    AM-PAC 6 Clicks Score (PT) 18  -AH 20  -KJ --    Highest Level of Mobility Goal 6 --> Walk 10 steps or more  -AH 6 --> Walk 10 steps or more  -KJ --       How much help from another is currently needed...    Putting on and taking off regular lower body clothing? -- -- 1  -AC (r) HW (t) AC (c)    Bathing (including washing, rinsing, and drying) -- -- 1  -AC (r) HW (t) AC (c)    Toileting (which includes using toilet bed pan or urinal) -- -- 2  -AC (r) HW (t) AC (c)    Putting on and taking off regular upper body clothing -- -- 3  -AC (r) HW (t) AC (c)    Taking care of personal grooming (such as brushing teeth) -- -- 2  -AC (r) HW (t) AC (c)    Eating meals -- -- 3  -AC (r) HW (t) AC (c)     AM-PAC 6 Clicks Score (OT) -- -- 12  -AC (r) HW (t)       Functional Assessment    Outcome Measure Options AM-PAC 6 Clicks Basic Mobility (PT)  -AH AM-PAC 6 Clicks Basic Mobility (PT)  -KJ AM-PAC 6 Clicks Daily Activity (OT)  -AC (r) HW (t) AC (c)              User Key  (r) = Recorded By, (t) = Taken By, (c) = Cosigned By      Initials Name Provider Type     Ezekiel Blake, OTR/L, CNT Occupational Therapist    Chery Cedeno, PTA Physical Therapist Assistant    Claudia Maddox, PARUL Physical Therapist Assistant    Yulia Escobar, OT Student OT Student                     Time Calculation:    PT Charges       Row Name 04/01/24 1332             Time Calculation    Start Time 1254  -      Stop Time 1317  -      Time Calculation (min) 23 min  -      PT Received On 04/01/24  -         Time Calculation- PT    Total Timed Code Minutes- PT 23 minute(s)  -AH         Timed Charges    86418 - PT Therapeutic Exercise Minutes 13  -AH      76488 - Gait Training Minutes  10  -AH         Total Minutes    Timed Charges Total Minutes 23  -AH       Total Minutes 23  -AH                User Key  (r) = Recorded By, (t) = Taken By, (c) = Cosigned By      Initials Name Provider Type     Chery Rosado, PARUL Physical Therapist Assistant                  Therapy Charges for Today       Code Description Service Date Service Provider Modifiers Qty    68201376212 HC PT THER PROC EA 15 MIN 4/1/2024 Chery Rosado, PTA GP 1    17469748051 HC GAIT TRAINING EA 15 MIN 4/1/2024 Chery Rosado, PTA GP 1            PT G-Codes  Outcome Measure Options: AM-PAC 6 Clicks Basic Mobility (PT)  AM-PAC 6 Clicks Score (PT): 18  AM-PAC 6 Clicks Score (OT): 16    Chery Rosado PTA  4/1/2024

## 2024-04-01 NOTE — THERAPY TREATMENT NOTE
Patient Name: Erick Luong  : 1955    MRN: [id number]                              Today's Date: 2024       Admit Date: 3/26/2024    Visit Dx:     ICD-10-CM ICD-9-CM   1. Diabetic foot infection  E11.628 250.80    L08.9 686.9   2. Poorly controlled diabetes mellitus  E11.65 250.00   3. Impaired functional mobility and activity tolerance [Z74.09]  Z74.09 V49.89     Patient Active Problem List   Diagnosis    Obesity, unspecified obesity severity, unspecified obesity type    Essential hypertension    Type 2 diabetes mellitus with hyperglycemia, with long-term current use of insulin    Nonsmoker    Anemia due to chronic kidney disease    Class 3 severe obesity due to excess calories with body mass index (BMI) of 40.0 to 44.9 in adult    Anasarca    Sleep apnea with use of continuous positive airway pressure (CPAP)    Medically noncompliant    Diabetic ulcer of left midfoot associated with type 2 diabetes mellitus, with fat layer exposed    Diabetic polyneuropathy associated with type 2 diabetes mellitus    Spinal stenosis in cervical region    Degeneration of cervical intervertebral disc    Cervical radiculopathy    Degeneration of lumbar or lumbosacral intervertebral disc    Cervical myelopathy    Bilateral carpal tunnel syndrome    CAD (coronary artery disease)    GERD without esophagitis    BPH without obstruction/lower urinary tract symptoms    Stage 3b chronic kidney disease    Chronic diastolic heart failure    Type 2 myocardial infarction due to heart failure    Left carpal tunnel syndrome    Syncope and collapse, recurrent episodes    Poorly-controlled hypertension    Rhinovirus    Peripheral vascular disease    Chronic kidney disease (CKD), stage IV (severe)    Diabetic foot infection    Sepsis     Past Medical History:   Diagnosis Date    Arthritis     Autonomic disease     CAD (coronary artery disease) 2017    Cervical radiculopathy 2021    Chronic constipation with acute  exaccerbation 05/10/2021    Coronary artery disease     Degeneration of cervical intervertebral disc 08/11/2021    Diabetes mellitus     Diabetic foot ulcer 08/31/2020    Diabetic polyneuropathy associated with type 2 diabetes mellitus 01/18/2021    Elevated cholesterol     Gastroesophageal reflux disease 05/13/2019    Headache     HTN (hypertension), benign 05/03/2017    Hyperlipidemia     Hypertension     Mixed hyperlipidemia 02/07/2017    Multiple lung nodules 01/26/2020    5mm, 9 mm RLL identified 1/2020, not present 10/2019.    Myocardial infarction     Osteomyelitis 01/22/2020    Osteomyelitis of fifth toe of right foot 10/07/2019    Pancreatitis     Persistent insomnia 01/20/2020    Renal disorder     Sleep apnea 02/06/2017    Sleep apnea with use of continuous positive airway pressure (CPAP)     NON-COMPLIANT    Slow transit constipation 01/16/2019    Spinal stenosis in cervical region 09/16/2021    Vitamin D deficiency 03/02/2021     Past Surgical History:   Procedure Laterality Date    ABDOMINAL SURGERY      AMPUTATION FOOT / TOE Left 10/2021    5th digit     ANTERIOR CERVICAL DISCECTOMY W/ FUSION N/A 8/5/2022    Procedure: CERVICAL DISCECTOMY ANTERIOR WITH FUSION C5-6 with possible plating of C5-7 with neuromonitoring and 1 c-arm;  Surgeon: Karel Soliz MD;  Location:  [facility] OR;  Service: Neurosurgery;  Laterality: N/A;    APPENDECTOMY      BACK SURGERY      CARDIAC CATHETERIZATION Left 02/08/2021    Procedure: Left Heart Cath w poss intervention left anatomical snuff box acess;  Surgeon: Omkar Charles DO;  Location:  [facility]ATH [facility];  Service: Cardiology;  Laterality: Left;    CARDIAC SURGERY      CATARACT EXTRACTION      CERVICAL SPINE SURGERY      COLONOSCOPY N/A 01/31/2017    Normal exam repeat in 5 years    COLONOSCOPY N/A 02/11/2019    Mild acute inflammation    COLONOSCOPY W/ POLYPECTOMY  03/04/2014    Hyperplastic polyp    CORONARY ARTERY BYPASS GRAFT  10/2015     ENDOSCOPY  04/13/2011    Gastritis with hemorrhage    ENDOSCOPY N/A 05/05/2017    Normal exam    ENDOSCOPY N/A 02/11/2019    Gastritis    ENDOSCOPY N/A 09/01/2020    Non-erosive gastritis with hemorrhage    ENDOSCOPY N/A 02/10/2021    Esophagitis    FOOT SURGERY Left     INCISION AND DRAINAGE OF WOUND Left 09/2007    spider bite      General Information       Row Name 04/01/24 0948          OT Time and Intention    Document Type therapy note (daily note)  -LS     Mode of Treatment occupational therapy  -       Row Name 04/01/24 0948          General Information    Patient Profile Reviewed yes  -LS     Existing Precautions/Restrictions fall  LLE WBAT in cam boot  -LS       Row Name 04/01/24 0948          Cognition    Orientation Status (Cognition) oriented to;person;place;time;oriented x 3  -LS       Row Name 04/01/24 0948          Safety Issues, Functional Mobility    Safety Issues Affecting Function (Mobility) safety precaution awareness;safety precautions follow-through/compliance  -LS     Impairments Affecting Function (Mobility) balance;endurance/activity tolerance;pain  -LS               User Key  (r) = Recorded By, (t) = Taken By, (c) = Cosigned By      Initials Name Provider Type    LS Alesha Barrett OTR/L Occupational Therapist                     Mobility/ADL's       Row Name 04/01/24 0948          Bed Mobility    Bed Mobility supine-sit  -LS     Supine-Sit [place] (Bed Mobility) standby assist  -     Assistive Device (Bed Mobility) bed rails;head of bed elevated  -       Row Name 04/01/24 0948          Transfers    Transfers sit-stand transfer;toilet transfer;stand-sit transfer  -       Row Name 04/01/24 0948          Sit-Stand Transfer    Sit-Stand [place] (Transfers) contact guard  -     Assistive Device (Sit-Stand Transfers) walker, front-wheeled  -       Row Name 04/01/24 0948          Stand-Sit Transfer    Stand-Sit [place] (Transfers) contact guard  -     Assistive Device  (Stand-Sit Transfers) walker, front-wheeled  -LS       Row Name 04/01/24 0948          Toilet Transfer    Type (Toilet Transfer) sit-stand;stand-sit  -LS     [place] Level (Toilet Transfer) contact guard  -LS     Assistive Device (Toilet Transfer) walker, front-wheeled;grab bars/safety frame  -       Row Name 04/01/24 0948          Functional Mobility    Functional Mobility- Ind. Level contact guard assist  -LS     Functional Mobility- Device walker, front-wheeled  -LS     Patient was able to Ambulate yes  -LS               User Key  (r) = Recorded By, (t) = Taken By, (c) = Cosigned By      Initials Name Provider Type    LS Alesha Barrett, OTR/L Occupational Therapist                   Obj/Interventions    No documentation.                  Goals/Plan    No documentation.                  Clinical Impression       Row Name 04/01/24 0948          Pain Assessment    Pretreatment Pain Rating 6/10  -LS     Posttreatment Pain Rating 6/10  -LS     Pain Location lower  -LS     Pain Location - abdomen  -LS     Pain Intervention(s) Repositioned;Ambulation/increased activity  -       Row Name 04/01/24 0948          Plan of Care Review    Plan of Care Reviewed With patient  -LS     Progress improving  -LS     Outcome Evaluation OT tx completed. Pt in fowlers upon therapist arrival; A&Ox3; c/o abdominal pain. Pt performed supine>sit utilizing bedrail with HOB elevated with SBA. Pt dependent for donning sock and cam boot while seated EOB. Pt performed sit<>stand and ambulated to/from BR utilizing rwx with CGA. Pt performed sit<>stand toilet transfer utilizing rwx and grab bar with CGA. Cont OT POC to address remaining deficits limiting ADL independence. Recommend sub acute rehab at discharge.  -       Row Name 04/01/24 0948          Therapy Plan Review/Discharge Plan (OT)    Anticipated Discharge Disposition (OT) sub acute care setting  -       Row Name 04/01/24 0948          Positioning and Restraints     Pre-Treatment Position in bed  -LS     Post Treatment Position chair  -LS     In Chair reclined;call light within reach;encouraged to call for assist;exit alarm on;legs elevated  LLE CAM boot donned  -               User Key  (r) = Recorded By, (t) = Taken By, (c) = Cosigned By      Initials Name Provider Type    Alesha Bloom OTR/L Occupational Therapist                   Outcome Measures       Row Name 04/01/24 0948          How much help from another is currently needed...    Putting on and taking off regular lower body clothing? 2  -LS     Bathing (including washing, rinsing, and drying) 2  -LS     Toileting (which includes using toilet bed pan or urinal) 2  -LS     Putting on and taking off regular upper body clothing 3  -LS     Taking care of personal grooming (such as brushing teeth) 3  -LS     Eating meals 4  -LS     AM-PAC 6 Clicks Score (OT) 16  -LS       Row Name 04/01/24 0720          How much help from another person do you currently need...    Turning from your back to your side while in flat bed without using bedrails? 3  -CM     Moving from lying on back to sitting on the side of a flat bed without bedrails? 2  -CM     Moving to and from a bed to a chair (including a wheelchair)? 2  -CM     Standing up from a chair using your arms (e.g., wheelchair, bedside chair)? 2  -CM     Climbing 3-5 steps with a railing? 1  -CM     To walk in hospital room? 2  -CM     AM-PAC 6 Clicks Score (PT) 12  -CM     Highest Level of Mobility Goal 4 --> Transfer to chair/commode  -CM       Row Name 04/01/24 0948          Functional Assessment    Outcome Measure Options AM-PAC 6 Clicks Daily Activity (OT)  -LS               User Key  (r) = Recorded By, (t) = Taken By, (c) = Cosigned By      Initials Name Provider Type    Yara Fontana RN Registered Nurse    Alesha Bloom OTR/L Occupational Therapist                    Occupational Therapy Education       Title: PT OT SLP Therapies (In Progress)        Topic: Occupational Therapy (In Progress)       Point: ADL training (Done)       Description:   Instruct learner(s) on proper safety adaptation and remediation techniques during self care or transfers.   Instruct in proper use of assistive devices.                  Learning Progress Summary             Patient Acceptance, E, VU,NR by  at 3/29/2024 1424    Comment: Role of OT, OT POC, fall prevention, benefits of ambulation, d/c recommendations                         Point: Home exercise program (Not Started)       Description:   Instruct learner(s) on appropriate technique for monitoring, assisting and/or progressing therapeutic exercises/activities.                  Learner Progress:  Not documented in this visit.              Point: Precautions (Done)       Description:   Instruct learner(s) on prescribed precautions during self-care and functional transfers.                  Learning Progress Summary             Patient Acceptance, E, VU,NR by  at 3/29/2024 1424    Comment: Role of OT, OT POC, fall prevention, benefits of ambulation, d/c recommendations                         Point: Body mechanics (Done)       Description:   Instruct learner(s) on proper positioning and spine alignment during self-care, functional mobility activities and/or exercises.                  Learning Progress Summary             Patient Acceptance, E, VU,NR by  at 3/29/2024 1424    Comment: Role of OT, OT POC, fall prevention, benefits of ambulation, d/c recommendations                                         User Key       Initials Effective Dates Name Provider Type Discipline     01/09/24 -  Yulia Pritchett OT Student OT Student OT                  OT Recommendation and Plan     Plan of Care Review  Plan of Care Reviewed With: patient  Progress: improving  Outcome Evaluation: OT tx completed. Pt in fowlers upon therapist arrival; A&Ox3; c/o abdominal pain. Pt performed supine>sit utilizing bedrail with HOB elevated with SBA.  Pt dependent for donning sock and cam boot while seated EOB. Pt performed sit<>stand and ambulated to/from BR utilizing rwx with CGA. Pt performed sit<>stand toilet transfer utilizing rwx and grab bar with CGA. Cont OT POC to address remaining deficits limiting ADL independence. Recommend sub acute rehab at discharge.     Time Calculation:         Time Calculation- OT       Row Name 04/01/24 0948             Time Calculation- OT    OT Start Time 0948  -LS      OT Stop Time 1013  -LS      OT Time Calculation (min) 25 min  -LS      Total Timed Code Minutes- OT 25 minute(s)  -LS      OT Received On 04/01/24  -                User Key  (r) = Recorded By, (t) = Taken By, (c) = Cosigned By      Initials Name Provider Type     Alesha Barrett OTR/L Occupational Therapist                  Therapy Charges for Today       Code Description Service Date Service Provider Modifiers Qty    [id number] HC OT SELF CARE/MGMT/TRAIN EA 15 MIN 4/1/2024 Alesha Barrett OTR/L GO 2                 ALEXANDER Gross/EDVIN  4/1/2024

## 2024-04-01 NOTE — PAYOR COMM NOTE
"Erick Luong (68 y.o. Male) [id number]   cont stay   please review clinical for additional days  [facility] phone    fax        3/29 clinical        Date of Birth   1955    Social Security Number       Address   [address] [place] [address]    Home Phone   [phone]    MRN   [id number]       Jew   [facility]    Marital Status                               Admission Date   3/26/24    Admission Type   Emergency    Admitting Provider   Latanya Paez MD    Attending Provider   Latanya Paez MD    Department, Room/Bed   [facility] 3C, 372/1       Discharge Date       Discharge Disposition       Discharge Destination                                 Attending Provider: Latanya Paez MD    Allergies: Cefepime, Bactrim [Sulfamethoxazole-trimethoprim], Vancomycin, Zolpidem, Zolpidem Tartrate, Metronidazole    Isolation: Contact   Infection: MRSA (05/19/19), COVID (History) (08/08/22)   Code Status: CPR    Ht: 182.9 cm (72\")   Wt: 134 kg (295 lb 6.7 oz)    Admission Cmt: None   Principal Problem: Sepsis [A41.9]                   Active Insurance as of 3/26/2024       Primary Coverage       Payor Plan Insurance Group Employer/Plan Group    [employer] [facility] [facility] [employer] EMPLOYEE [id number]       Payor Plan Address Payor Plan Phone Number Payor Plan Fax Number Effective Dates    [address] [phone]  1/1/2022 - None Entered    [facility] [id number]         Subscriber Name Subscriber Birth Date Member ID       ZAINAB LUONG 11/27/1969 [id number]               Secondary Coverage       Payor Plan Insurance Group Employer/Plan Group    MEDICARE MEDICARE A & B        Payor Plan Address Payor Plan Phone Number Payor Plan Fax Number Effective Dates    [address] [phone]  7/1/2013 - None Entered    [facility] [id number]         Subscriber Name Subscriber Birth Date Member ID       ERICK LUONG 1955 [id number]       "               Emergency Contacts        (Rel.) Home Phone Work Phone Mobile Phone    Joan Luong (Spouse) [phone]              Current Facility-Administered Medications   Medication Dose Route Frequency Provider Last Rate Last Admin    acetaminophen (TYLENOL) tablet 650 mg  650 mg Oral Q4H PRN Raquel Duncan APRN   650 mg at 04/01/24 0446    Or    acetaminophen (TYLENOL) 160 MG/5ML oral solution 650 mg  650 mg Oral Q4H PRN Raquel Duncan APRN   650 mg at 03/27/24 2109    Or    acetaminophen (TYLENOL) suppository 650 mg  650 mg Rectal Q4H PRN Raquel Duncan APRN        apixaban (ELIQUIS) tablet 5 mg  5 mg Oral Q12H Raquel Duncan APRN   5 mg at 04/01/24 0802    ascorbic acid (VITAMIN C) tablet 1,000 mg  1,000 mg Oral Daily Rene Maloney MD   1,000 mg at 04/01/24 0804    aspirin EC tablet 81 mg  81 mg Oral Daily Rene Maloney MD   81 mg at 04/01/24 0802    sennosides-docusate (PERICOLACE) 8.6-50 MG per tablet 1 tablet  1 tablet Oral BID Raquel Duncan APRN   1 tablet at 04/01/24 0801    And    polyethylene glycol (MIRALAX) packet 17 g  17 g Oral Daily PRN Raquel Duncan APRN        And    bisacodyl (DULCOLAX) EC tablet 5 mg  5 mg Oral Daily PRN Raquel Duncan APRN        And    bisacodyl (DULCOLAX) suppository 10 mg  10 mg Rectal Daily PRN Raquel Duncan APRN        calcitriol (ROCALTROL) capsule 0.5 mcg  0.5 mcg Oral Daily Rene Maloney MD   0.5 mcg at 04/01/24 0804    carvedilol (COREG) tablet 3.125 mg  3.125 mg Oral BID With Meals Rene Maloney MD   3.125 mg at 04/01/24 0803    dextrose (D50W) (25 g/50 mL) IV injection 25 g  25 g Intravenous Q15 Min PRN Raquel Duncan APRN        dextrose (GLUTOSE) oral gel 15 g  15 g Oral Q15 Min PRN Raquel Duncan APRN   15 g at 03/27/24 1710    Diclofenac Sodium (VOLTAREN) 1 % gel 2 g  2 g Topical 4x Daily PRN Rene Maloney MD        dicyclomine (BENTYL) capsule  20 mg  20 mg Oral TID PRN Rene Maloney MD   20 mg at 03/31/24 1603    donepezil (ARICEPT) tablet 10 mg  10 mg Oral Daily Raquel Duncan APRN   10 mg at 04/01/24 0802    famotidine (PEPCID) tablet 20 mg  20 mg Oral Daily Rene Maloney MD   20 mg at 04/01/24 0803    glucagon (GLUCAGEN) injection 1 mg  1 mg Intramuscular Q15 Min PRN Raquel Duncan, APRSHERRILL        haloperidol lactate (HALDOL) injection 5 mg  5 mg Intravenous Q6H PRN Rene Maloney MD        insulin detemir (LEVEMIR) injection 30 Units  30 Units Subcutaneous Nightly Raquel Duncan APRN   30 Units at 03/31/24 2023    Insulin Lispro (humaLOG) injection 4-24 Units  4-24 Units Subcutaneous 4x Daily AC & at Bedtime Raquel Duncan APRN   12 Units at 04/01/24 0810    insulin regular (humuLIN R,novoLIN R) injection 10 Units  10 Units Subcutaneous TID AC Steve De Jesus MD   10 Units at 04/01/24 0810    isosorbide mononitrate (IMDUR) 24 hr tablet 30 mg  30 mg Oral Q24H Rene Maloney MD   30 mg at 04/01/24 0804    Linezolid (ZYVOX) 600 mg 300 mL  600 mg Intravenous Q12H Julia Adrian  mL/hr at 03/31/24 2331 600 mg at 03/31/24 2331    LORazepam (ATIVAN) injection 1 mg  1 mg Intravenous Q4H PRN Rene Maloney MD   1 mg at 03/29/24 0140    magnesium hydroxide (MILK OF MAGNESIA) suspension 10 mL  10 mL Oral Daily PRN Rene Maloney MD   10 mL at 03/31/24 1202    melatonin tablet 6 mg  6 mg Oral Nightly Rene Maloney MD   6 mg at 03/31/24 2024    midodrine (PROAMATINE) tablet 2.5 mg  2.5 mg Oral TID AC Rene Maloney MD   2.5 mg at 04/01/24 0803    Morphine sulfate (PF) injection 2 mg  2 mg Intravenous Q3H PRN Rene Maloney MD   2 mg at 03/31/24 1501    mupirocin (BACTROBAN) 2 % nasal ointment 1 Application  1 Application Each Nare BID Nadia Polo, APRN   1 Application at 04/01/24 0802    nitroglycerin (NITROSTAT) SL tablet 0.4 mg  0.4 mg Sublingual Q5 Min PRN Raquel Duncan,  APRN        ondansetron ODT (ZOFRAN-ODT) disintegrating tablet 4 mg  4 mg Oral Q6H PRN Raquel Duncan APRN   4 mg at 03/30/24 1748    Or    ondansetron (ZOFRAN) injection 4 mg  4 mg Intravenous Q6H PRN Raquel Duncan, APRN   4 mg at 03/29/24 1837    oxyCODONE (ROXICODONE) immediate release tablet 10 mg  10 mg Oral BID PRN Raquel Duncan APRN   10 mg at 03/31/24 2036    pregabalin (LYRICA) capsule 100 mg  100 mg Oral Nightly Rene Maloney MD   100 mg at 03/31/24 2023    pregabalin (LYRICA) capsule 50 mg  50 mg Oral Daily Rene Maloney MD   50 mg at 04/01/24 0801    rosuvastatin (CRESTOR) tablet 10 mg  10 mg Oral Nightly Rene Maloney MD   10 mg at 03/31/24 2024    sodium chloride 0.9 % flush 10 mL  10 mL Intravenous PRN Steve De Jesus MD        sodium chloride 0.9 % flush 10 mL  10 mL Intravenous Q12H Raquel Duncan, SUSAN   10 mL at 04/01/24 0805    sodium chloride 0.9 % flush 10 mL  10 mL Intravenous PRN Raquel Duncan APRN        sodium chloride 0.9 % infusion 40 mL  40 mL Intravenous PRN Raquel Duncan, SUSAN        sucralfate (CARAFATE) 1 GM/10ML suspension 1 g  1 g Oral TID AC Rene Maloney MD   1 g at 04/01/24 0802    tamsulosin (FLOMAX) 24 hr capsule 0.4 mg  0.4 mg Oral Daily Rene Maloney MD   0.4 mg at 04/01/24 0804    timolol (TIMOPTIC) 0.25 % ophthalmic solution 1 drop  1 drop Both Eyes Q12H Rene Maloney MD   1 drop at 04/01/24 0804    vitamin D3 capsule 5,000 Units  5,000 Units Oral Daily Rene Maloney MD   5,000 Units at 04/01/24 0802    zinc sulfate (ZINCATE) capsule 220 mg  220 mg Oral Daily Rene Maloney MD   220 mg at 04/01/24 0802        Rene Maloney MD   Physician  Hospitalist     Progress Notes     Signed     Date of Service: 03/29/24 1817  Creation Time: 03/29/24 1817     Signed              [facility] Medicine Services  INPATIENT PROGRESS NOTE     Patient Name: Erick Luong  Date  of Admission: 3/26/2024  Today's Date: 03/29/24  Length of Stay: 3  Primary Care Physician: Del Shetty MD     Subjective   Chief Complaint: Altered mental status     HPI   Patient is alert and oriented x 3.  He appears to be back to baseline mental status.  Still awaiting MRI of the left foot which has not been done.     Review of Systems   All pertinent negatives and positives are as above. All other systems have been reviewed and are negative unless otherwise stated.      Objective    Temp:  [98.3 °F (36.8 °C)-99.5 °F (37.5 °C)] 98.7 °F (37.1 °C)  Heart Rate:  [] 77  Resp:  [16] 16  BP: ()/(45-70) 94/45  Physical Exam  Constitutional:       General: He is not in acute distress.     Appearance: He is well-developed. He is not diaphoretic.   HENT:      Head: Normocephalic.   Eyes:      General: No scleral icterus.     Conjunctiva/sclera: Conjunctivae normal.      Pupils: Pupils are equal, round, and reactive to light.   Neck:      Thyroid: No thyromegaly.      Vascular: No JVD.      Trachea: No tracheal deviation.   Cardiovascular:      Rate and Rhythm: Normal rate and regular rhythm.      Heart sounds: Normal heart sounds. No murmur heard.     No friction rub. No gallop.   Pulmonary:      Effort: Pulmonary effort is normal. No respiratory distress.      Breath sounds: Normal breath sounds. No stridor. No wheezing or rales.   Chest:      Chest wall: No tenderness.   Abdominal:      General: Bowel sounds are normal. There is no distension.      Palpations: Abdomen is soft. There is no mass.      Tenderness: There is no abdominal tenderness. There is no guarding or rebound.      Hernia: No hernia is present.   Musculoskeletal:         General: Swelling and signs of injury present. No tenderness or deformity. Normal range of motion.      Cervical back: Normal range of motion and neck supple.      Right lower leg: No edema.      Comments: Left foot with ulcers noted on dorsal and plantar surface/heel    Lymphadenopathy:      Cervical: No cervical adenopathy.   Skin:     General: Skin is warm and dry.      Coloration: Skin is not pale.      Findings: No erythema or rash.   Neurological:      General: No focal deficit present.      Mental Status: He is alert and oriented to person, place, and time.      Cranial Nerves: No cranial nerve deficit.      Sensory: No sensory deficit.      Motor: No abnormal muscle tone.      Coordination: Coordination normal.   Psychiatric:         Mood and Affect: Mood normal.         Behavior: Behavior normal.              File Link     Scan on 3/26/2024 1423 by Ro Vinson RN: Wound 06/15/23 0102 Left anterior plantar        Key Information     Document ID File Type Document Type Description   [id number] Image WOUND IMAGE Wound 06/15/23 0102 Left anterior plantar      Import Information     Attached At Date Time User Dept   Encounter Level 3/26/2024  2:23 PM Ro Vinson RN [facility] Emergency Dept      Encounter     Hospital Encounter on 3/26/24            Results Review:  I have reviewed the labs, radiology results, and diagnostic studies.     Laboratory Data:          Results from last 7 days   Lab Units 03/29/24  0551 03/28/24  1004 03/27/24  0449   WBC 10*3/mm3 6.08 9.45 12.75*   HEMOGLOBIN g/dL 11.1* 13.7 11.8*   HEMATOCRIT % 33.6* 41.1 35.9*   PLATELETS 10*3/mm3 156 154 163                 Results from last 7 days   Lab Units 03/29/24  0551 03/28/24  1004 03/27/24  0449 03/26/24  1326   SODIUM mmol/L 138 140 142 140   POTASSIUM mmol/L 3.3* 3.6 3.5 3.4*   CHLORIDE mmol/L 103 102 103 103   CO2 mmol/L 23.0 22.0 25.0 23.0   BUN mg/dL 40* 36* 29* 29*   CREATININE mg/dL 2.07* 2.03* 2.07* 1.93*   CALCIUM mg/dL 8.4* 9.2 8.9 9.5   BILIRUBIN mg/dL 0.7  --   --  0.7   ALK PHOS U/L 81  --   --  91   ALT (SGPT) U/L 17  --   --  8   AST (SGOT) U/L 32  --   --  13   GLUCOSE mg/dL 120* 79 169* 438*         Culture Data:         Blood Culture   Date Value Ref Range Status    03/26/2024 Abnormal Stain (C)   Preliminary   03/26/2024 Abnormal Stain (C)   Preliminary         Radiology Data:   Imaging Results (Last 24 Hours)         Procedure Component Value Units Date/Time     MRI Foot Left With & Without Contrast [[id number]] Resulted: 03/29/24 1731       Updated: 03/29/24 1814                I have reviewed the patient's current medications.      Assessment/Plan   Assessment       Active Hospital Problems     Diagnosis      **Sepsis      Diabetic foot infection      Chronic kidney disease (CKD), stage IV (severe)      Chronic diastolic heart failure      BPH without obstruction/lower urinary tract symptoms      Diabetic polyneuropathy associated with type 2 diabetes mellitus      Anemia due to chronic kidney disease      Essential hypertension      Type 2 diabetes mellitus with hyperglycemia, with long-term current use of insulin     MRSA bacteremia  Metabolic encephalopathy secondary to sepsis     Treatment Plan  Continue IV antibiotics with Zyvox.  Discontinue meropenem.  Infectious disease input appreciated.     Podiatry consultation input appreciated.  Will follow recommendations.  Please do MR left foot today to rule out osteomyelitis.  Follow-up repeat blood cultures.     Nephrology and urology input appreciated.  Will monitor renal function and watch for signs of urinary retention     Pain control with opiate pain medications.     Insulin sliding scale and Levemir for type 2 diabetes mellitus     IV Haldol as needed for agitation.     DVT prophylaxis with Eliquis     PT/OT/wound care consultations     DVT prophylaxis with Eliquis     CODE STATUS is full code     Medical Decision Making  Number and Complexity of problems: 4 acute, severe, high complexity medical problems.  Multiple chronic medical problems.  Differential Diagnosis: None     Conditions and Status        Condition is worsening.     [facility] Data  External documents reviewed: None  Cardiac tracing (EKG, telemetry)  interpretation: None  Radiology interpretation: None  Labs reviewed: CBC, BMP, blood cultures reviewed by me  Any tests that were considered but not ordered: None     Decision rules/scores evaluated (example [id number], Wells, etc): N/A     Discussed with: Patient     Care Planning  Shared decision making: Patient and his wife  Code status and discussions: CODE STATUS is full code     Disposition  Social Determinants of Health that impact treatment or disposition: None  I expect the patient to be discharged to skilled nursing facility in 5 to 7 days.      Electronically signed by Rene Maloney MD, 03/29/24, 18:17 CDT.                     Adelaida Vines APRN   Nurse Practitioner  Podiatry     Consults     Attested     Date of Service: 03/29/24 1258  Creation Time: 03/29/24 1258     Attested           Attestation signed by Asad Cerrato DPM at 03/29/24 6907     I have reviewed this documentation and agree.  Will re-evaluate on Monday if patient remains admitted.  Otherwise, from a podiatry stand point, continue current wound care orders and may follow-up with wound care in [place] upon discharge.                Expand All Collapse All[]Expand All by Default         [facility] - PODIATRY     Today's Date: 03/29/24      Patient Name: Erick Luong  MRN: [id number]  CSN: [id number]  PCP: Del Shetty MD  Referring Provider: No ref. provider found  Attending Provider: Rene Maloney MD  Length of Stay: 3     SUBJECTIVE   Chief Complaint: Left foot wounds     HPI: Erick Luong, a 68 y.o.male, who was admitted on 3/26/2024 and podiatry consult was made for left foot wounds.  Patient alert today and denies pain in left foot.  Patient reports he has not had an MRI of his left foot performed yet.      Medical History        Past Medical History:   Diagnosis Date    Arthritis      Autonomic disease      CAD (coronary artery disease) 02/06/2017    Cervical radiculopathy 09/16/2021    Chronic  constipation with acute exaccerbation 05/10/2021    Coronary artery disease      Degeneration of cervical intervertebral disc 08/11/2021    Diabetes mellitus      Diabetic foot ulcer 08/31/2020    Diabetic polyneuropathy associated with type 2 diabetes mellitus 01/18/2021    Elevated cholesterol      Gastroesophageal reflux disease 05/13/2019    Headache      HTN (hypertension), benign 05/03/2017    Hyperlipidemia      Hypertension      Mixed hyperlipidemia 02/07/2017    Multiple lung nodules 01/26/2020     5mm, 9 mm RLL identified 1/2020, not present 10/2019.    Myocardial infarction      Osteomyelitis 01/22/2020    Osteomyelitis of fifth toe of right foot 10/07/2019    Pancreatitis      Persistent insomnia 01/20/2020    Renal disorder      Sleep apnea 02/06/2017    Sleep apnea with use of continuous positive airway pressure (CPAP)       NON-COMPLIANT    Slow transit constipation 01/16/2019    Spinal stenosis in cervical region 09/16/2021    Vitamin D deficiency 03/02/2021         Surgical History         Past Surgical History:   Procedure Laterality Date    ABDOMINAL SURGERY        AMPUTATION FOOT / TOE Left 10/2021     5th digit     ANTERIOR CERVICAL DISCECTOMY W/ FUSION N/A 8/5/2022     Procedure: CERVICAL DISCECTOMY ANTERIOR WITH FUSION C5-6 with possible plating of C5-7 with neuromonitoring and 1 c-arm;  Surgeon: Karel Soliz MD;  Location:  [facility] OR;  Service: Neurosurgery;  Laterality: N/A;    APPENDECTOMY        BACK SURGERY        CARDIAC CATHETERIZATION Left 02/08/2021     Procedure: Left Heart Cath w poss intervention left anatomical snuff box acess;  Surgeon: Omkar Charles DO;  Location:  [facility] CATH INVASIVE LOCATION;  Service: Cardiology;  Laterality: Left;    CARDIAC SURGERY        CATARACT EXTRACTION        CERVICAL SPINE SURGERY        COLONOSCOPY N/A 01/31/2017     Normal exam repeat in 5 years    COLONOSCOPY N/A 02/11/2019     Mild acute inflammation    COLONOSCOPY W/ POLYPECTOMY  "  2014     Hyperplastic polyp    CORONARY ARTERY BYPASS GRAFT   10/2015    ENDOSCOPY   2011     Gastritis with hemorrhage    ENDOSCOPY N/A 2017     Normal exam    ENDOSCOPY N/A 2019     Gastritis    ENDOSCOPY N/A 2020     Non-erosive gastritis with hemorrhage    ENDOSCOPY N/A 02/10/2021     Esophagitis    FOOT SURGERY Left      INCISION AND DRAINAGE OF WOUND Left 2007     spider bite               Family History   Problem Relation Age of Onset    Colon cancer Father      Heart disease Father      Colon cancer Sister      Colon polyps Sister      Alzheimer's disease Mother      Coronary artery disease Sister      Coronary artery disease Sister        Social History   Social History            Socioeconomic History    Marital status:    Tobacco Use    Smoking status: Former       Current packs/day: 0.00       Types: Cigarettes       Quit date:        Years since quittin.2    Smokeless tobacco: Never    Tobacco comments:       smoked in [employer]   Vaping Use    Vaping status: Never Used   Substance and Sexual Activity    Alcohol use: No    Drug use: No    Sexual activity: Defer         Allergies         Allergies   Allergen Reactions    Cefepime Hives and Anaphylaxis    Bactrim [Sulfamethoxazole-Trimethoprim] Other (See Comments)       \"RENAL FAILURE\"    Vancomycin Itching    Zolpidem Unknown - High Severity       \"makes him crazy\"    Zolpidem Tartrate Unknown - Low Severity and Provider Review Needed    Metronidazole Rash         Current Medications             Current Facility-Administered Medications   Medication Dose Route Frequency Provider Last Rate Last Admin    acetaminophen (TYLENOL) tablet 650 mg  650 mg Oral Q4H PRN Raquel Duncan APRN   650 mg at 24 1005     Or    acetaminophen (TYLENOL) 160 MG/5ML oral solution 650 mg  650 mg Oral Q4H PRN Raquel Duncan APRN   650 mg at 24     Or    acetaminophen (TYLENOL) suppository 650 mg  650 " mg Rectal Q4H PRN Raquel Duncan APRN        apixaban (ELIQUIS) tablet 5 mg  5 mg Oral Q12H Raquel Duncan APRN   5 mg at 03/29/24 0826    ascorbic acid (VITAMIN C) tablet 1,000 mg  1,000 mg Oral Daily Rene Maloney MD   1,000 mg at 03/29/24 0826    aspirin EC tablet 81 mg  81 mg Oral Daily Rene Maloney MD   81 mg at 03/29/24 0826    sennosides-docusate (PERICOLACE) 8.6-50 MG per tablet 1 tablet  1 tablet Oral BID Raquel Duncan APRN   1 tablet at 03/28/24 2126     And    polyethylene glycol (MIRALAX) packet 17 g  17 g Oral Daily PRN Raquel Duncan APRN         And    bisacodyl (DULCOLAX) EC tablet 5 mg  5 mg Oral Daily PRN Raquel Duncan APRN         And    bisacodyl (DULCOLAX) suppository 10 mg  10 mg Rectal Daily PRN Raquel Duncan APRN        bumetanide (BUMEX) tablet 1 mg  1 mg Oral Daily Raquel Duncan APRN   1 mg at 03/29/24 0826    calcitriol (ROCALTROL) capsule 0.5 mcg  0.5 mcg Oral Daily Rene Maloney MD   0.5 mcg at 03/29/24 0826    carvedilol (COREG) tablet 3.125 mg  3.125 mg Oral BID With Meals Rene Maloney MD   3.125 mg at 03/28/24 1836    dextrose (D50W) (25 g/50 mL) IV injection 25 g  25 g Intravenous Q15 Min PRN Raquel Duncan APRN        dextrose (GLUTOSE) oral gel 15 g  15 g Oral Q15 Min PRN Raquel Duncan APRN   15 g at 03/27/24 1710    Diclofenac Sodium (VOLTAREN) 1 % gel 2 g  2 g Topical 4x Daily PRN Rene Maloney MD        donepezil (ARICEPT) tablet 10 mg  10 mg Oral Daily Raquel Duncan APRN   10 mg at 03/29/24 0826    famotidine (PEPCID) tablet 20 mg  20 mg Oral BID AC Rene Maloney MD   20 mg at 03/29/24 0826    glucagon (GLUCAGEN) injection 1 mg  1 mg Intramuscular Q15 Min PRN Raquel Duncan, APRN        haloperidol lactate (HALDOL) injection 5 mg  5 mg Intravenous Q6H PRN Rene Maloney MD        insulin detemir (LEVEMIR) injection 30 Units  30 Units Subcutaneous Nightly Raquel Duncan, APRN   30  Units at 03/28/24 2125    Insulin Lispro (humaLOG) injection 4-24 Units  4-24 Units Subcutaneous 4x Daily AC & at Bedtime Raquel Duncan APRN   4 Units at 03/28/24 1838    insulin regular (humuLIN R,novoLIN R) injection 10 Units  10 Units Subcutaneous TID AC Steve De Jesus MD   10 Units at 03/29/24 1225    isosorbide mononitrate (IMDUR) 24 hr tablet 30 mg  30 mg Oral Q24H Rene Maloney MD   30 mg at 03/29/24 0826    Linezolid (ZYVOX) 600 mg 300 mL  600 mg Intravenous Q12H Raquel Duncan APRN 300 mL/hr at 03/29/24 1225 600 mg at 03/29/24 1225    LORazepam (ATIVAN) injection 1 mg  1 mg Intravenous Q4H PRN Rene Maloney MD   1 mg at 03/29/24 0140    magnesium hydroxide (MILK OF MAGNESIA) suspension 10 mL  10 mL Oral Daily PRN Rene Maloney MD        melatonin tablet 6 mg  6 mg Oral Nightly eRne Maloney MD   6 mg at 03/28/24 2126    midodrine (PROAMATINE) tablet 2.5 mg  2.5 mg Oral TID AC Rene Maloney MD   2.5 mg at 03/29/24 1225    mupirocin (BACTROBAN) 2 % nasal ointment 1 Application  1 Application Each Nare BID Nadia Polo, APRN   1 Application at 03/29/24 0826    nitroglycerin (NITROSTAT) SL tablet 0.4 mg  0.4 mg Sublingual Q5 Min PRN Raquel Duncan APRN        ondansetron ODT (ZOFRAN-ODT) disintegrating tablet 4 mg  4 mg Oral Q6H PRN Raquel Duncan APRN         Or    ondansetron (ZOFRAN) injection 4 mg  4 mg Intravenous Q6H PRN Raquel Duncan APRN   4 mg at 03/29/24 1123    oxyCODONE (ROXICODONE) immediate release tablet 10 mg  10 mg Oral BID PRN Raquel Duncan APRN   10 mg at 03/29/24 1229    pregabalin (LYRICA) capsule 100 mg  100 mg Oral Nightly Rene Maloney MD   100 mg at 03/28/24 2126    pregabalin (LYRICA) capsule 50 mg  50 mg Oral Daily Rene Maloney MD   50 mg at 03/29/24 0826    rosuvastatin (CRESTOR) tablet 10 mg  10 mg Oral Nightly Rene Maloney MD   10 mg at 03/28/24 2126    sodium chloride 0.9 % flush 10 mL  10  mL Intravenous PRN Steve De Jesus MD        sodium chloride 0.9 % flush 10 mL  10 mL Intravenous Q12H Raquel Duncan APRN   10 mL at 03/29/24 0828    sodium chloride 0.9 % flush 10 mL  10 mL Intravenous PRN Raquel Duncan APRN        sodium chloride 0.9 % infusion 40 mL  40 mL Intravenous PRN Raquel Duncan, SUSAN        sucralfate (CARAFATE) 1 GM/10ML suspension 1 g  1 g Oral TID AC Rene Maloney MD   1 g at 03/29/24 1225    tamsulosin (FLOMAX) 24 hr capsule 0.4 mg  0.4 mg Oral Daily Rene Maloney MD   0.4 mg at 03/29/24 0826    timolol (TIMOPTIC) 0.25 % ophthalmic solution 1 drop  1 drop Both Eyes Q12H Rene Maloney MD   1 drop at 03/29/24 0828    vitamin D3 capsule 5,000 Units  5,000 Units Oral Daily Rene Maloney MD   5,000 Units at 03/29/24 0826    zinc sulfate (ZINCATE) capsule 220 mg  220 mg Oral Daily Rene Maloney MD   220 mg at 03/29/24 0827         Review of Systems   Constitutional:  Negative for activity change.   HENT:  Negative for congestion.    Respiratory:  Negative for apnea and shortness of breath.    Gastrointestinal:  Negative for abdominal pain.   Musculoskeletal:  Positive for arthralgias. Negative for back pain.   Skin:  Positive for wound.   Neurological:  Positive for numbness.   Psychiatric/Behavioral:  Negative for agitation, behavioral problems and confusion.          OBJECTIVE          Vitals:     03/29/24 1125   BP: 94/50   Pulse: 79   Resp: 16   Temp: 99.3 °F (37.4 °C)   SpO2: 95%         PHYSICAL EXAM  GEN:   Pt presents in hospital bed. Accompanied by none.     Foot/Ankle Exam     GENERAL  Appearance:  appears stated age  Orientation:  AAOx3  Affect:  appropriate     VASCULAR      Right Foot Vascularity   Dorsalis pedis:  2+  Posterior tibial:  2+  Skin temperature:  warm  Edema grading:  Trace  CFT:  3  Varicosities:  none      Left Foot Vascularity   Posterior tibial:  2+  Skin temperature:  warm  Edema grading:  Trace  CFT:   3  Pedal hair growth:  Absent  Varicosities:  none     NEUROLOGIC      Right Foot Neurologic   Light touch sensation: diminished  Vibratory sensation: diminished  Hot/Cold sensation: diminished      Left Foot Neurologic   Light touch sensation: diminished  Vibratory sensation: diminished     MUSCULOSKELETAL      Right Foot Musculoskeletal   Tenderness:  none        Left Foot Musculoskeletal    Amputation   Toes amputated: fifth toe  Tenderness:  none     MUSCLE STRENGTH      Right Foot Muscle Strength   Foot dorsiflexion:  5  Foot plantar flexion:  5  Foot inversion:  5  Foot eversion:  5      Left Foot Muscle Strength   Foot dorsiflexion:  4+  Foot plantar flexion:  4+  Foot inversion:  4+  Foot eversion:  4+     DERMATOLOGIC          Left Foot Dermatologic   Skin  Positive for wound.      Left foot additional comments: Multiple wounds  See photos     Image:                                              RADIOLOGY/NUCLEAR:  XR Foot 3+ View Left     Result Date: 3/26/2024  Narrative: EXAMINATION: XR FOOT 3+ VW LEFT-  3/26/2024 4:19 PM  HISTORY: foot infection  3 view LEFT foot exam.  Previous amputation of the mid/distal fifth metatarsal.  Interval resection or resorption of the fourth metatarsal head.  No bone destruction or soft tissue air is seen.  High-grade degenerative disease at the first metatarsal phalangeal joint.  Unchanged appearance of prominent dorsal midfoot spurring and prominent inferior calcaneal spurring.  Mild soft tissue edema over the dorsum of the foot is less prominent as compared with the previous exam.       Impression: 1. No bone destruction or soft tissue air is seen. 2. Prominent degenerative spurring.    This report was signed and finalized on 3/26/2024 5:25 PM by Dr. Gomez Mcgill MD.       XR Chest 1 View     Result Date: 3/26/2024  Narrative: EXAM: XR CHEST 1 VW-  DATE: 3/26/2024 1:15 PM  HISTORY: ams   COMPARISON: 8/28/2023.  TECHNIQUE:  Frontal view(s) of the chest submitted.   FINDINGS:  Patient is status post median sternotomy and CABG. There is enlargement of the cardiac silhouette. There are low lung volumes with vascular crowding versus mild volume overload. No effusion or pneumothorax is seen.        Impression:  1. Postoperative chest and low lung volumes with vascular crowding versus mild volume overload.  This report was signed and finalized on 3/26/2024 2:34 PM by Eran Christina.       CT Head Without Contrast     Result Date: 3/26/2024  Narrative: EXAM: CT HEAD WO CONTRAST-  DATE: 3/26/2024 1:03 PM  HISTORY: ams   COMPARISON: 10/10/2023.  DOSE LENGTH PRODUCT: 876.8 mGy.cm  Automated exposure control was also utilized to decrease patient radiation dose.  TECHNIQUE: Unenhanced CT images obtained from vertex to skull base with multiplanar reformats.  FINDINGS: There is no acute intracranial hemorrhage, midline shift, mass effect, or hydrocephalus. There is no CT evidence for acute infarct.  There are chronic changes with volume loss and chronic small vessel ischemic change of the periventricular white matter.  SOFT TISSUES: The scalp soft tissues are unremarkable.   SINUS: There is partially imaged mucosal thickening at the right maxillary sinus.  ORBITS: The visualized orbits and globes are unremarkable. There is bilateral lens extraction.        Impression: 1. Chronic changes and no acute intracranial findings.  This report was signed and finalized on 3/26/2024 2:10 PM by Eran Christina.        LABORATORY/CULTURE RESULTS:         Results from last 7 days   Lab Units 03/29/24  0551 03/28/24  1004 03/27/24  0449   WBC 10*3/mm3 6.08 9.45 12.75*   HEMOGLOBIN g/dL 11.1* 13.7 11.8*   HEMATOCRIT % 33.6* 41.1 35.9*   PLATELETS 10*3/mm3 156 154 163              Results from last 7 days   Lab Units 03/29/24  0551 03/28/24  1004 03/27/24  0449 03/26/24  1326   SODIUM mmol/L 138 140 142 140   POTASSIUM mmol/L 3.3* 3.6 3.5 3.4*   CHLORIDE mmol/L 103 102 103 103   CO2 mmol/L 23.0 22.0 25.0  23.0   BUN mg/dL 40* 36* 29* 29*   CREATININE mg/dL 2.07* 2.03* 2.07* 1.93*   CALCIUM mg/dL 8.4* 9.2 8.9 9.5   BILIRUBIN mg/dL 0.7  --   --  0.7   ALK PHOS U/L 81  --   --  91   ALT (SGPT) U/L 17  --   --  8   AST (SGOT) U/L 32  --   --  13   GLUCOSE mg/dL 120* 79 169* 438*           Results from last 7 days   Lab Units 03/26/24  1326   INR   1.07      Microbiology Results (last 10 days)         Procedure Component Value - Date/Time     MRSA Screen, PCR (Inpatient) - Swab, Nares [[id number]]  (Abnormal) Collected: 03/28/24 2128     Lab Status: Final result Specimen: Swab from Nares Updated: 03/28/24 2239       MRSA PCR MRSA Detected     Narrative:       The negative predictive value of this diagnostic test is high and should only be used to consider de-escalating anti-MRSA therapy. A positive result may indicate colonization with MRSA and must be correlated clinically.     AMAN AURIS SCREEN - Swab, Axilla Right, Axilla Left and Groin [[id number]]  (Normal) Collected: 03/27/24 0351     Lab Status: Preliminary result Specimen: Swab from Axilla Right, Axilla Left and Groin Updated: 03/29/24 0416       Candida Auris Screen Culture No Candida auris isolated at 2 days     Respiratory Panel PCR w/COVID-19(SARS-CoV-2) [facility] In-House, NP Swab in UTM/VTM, 2 HR TAT - Swab, Nasopharynx [[id number]]  (Normal) Collected: 03/26/24 1732     Lab Status: Final result Specimen: Swab from Nasopharynx Updated: 03/26/24 1905       ADENOVIRUS, PCR Not Detected       Coronavirus 229E Not Detected       Coronavirus HKU1 Not Detected       Coronavirus NL63 Not Detected       Coronavirus OC43 Not Detected       COVID19 Not Detected       Human Metapneumovirus Not Detected       Human Rhinovirus/Enterovirus Not Detected       Influenza A PCR Not Detected       Influenza B PCR Not Detected       Parainfluenza Virus 1 Not Detected       Parainfluenza Virus 2 Not Detected       Parainfluenza Virus 3 Not Detected        Parainfluenza Virus 4 Not Detected       RSV, PCR Not Detected       Bordetella pertussis pcr Not Detected       Bordetella parapertussis PCR Not Detected       Chlamydophila pneumoniae PCR Not Detected       Mycoplasma pneumo by PCR Not Detected     Narrative:       In the setting of a positive respiratory panel with a viral infection PLUS a negative procalcitonin without other underlying concern for bacterial infection, consider observing off antibiotics or discontinuation of antibiotics and continue supportive care. If the respiratory panel is positive for atypical bacterial infection (Bordetella pertussis, Chlamydophila pneumoniae, or Mycoplasma pneumoniae), consider antibiotic de-escalation to target atypical bacterial infection.     Blood Culture - Blood, Arm, Right [[id number]]  (Abnormal) Collected: 03/26/24 1346     Lab Status: Final result Specimen: Blood from Arm, Right Updated: 03/29/24 0513       Blood Culture Staphylococcus aureus, MRSA       Comment:    Infectious disease consultation is highly recommended to rule out distant foci of infection.  Methicillin resistant Staphylococcus aureus, Patient may be an isolation risk.          Isolated from Aerobic and Anaerobic Bottles       Gram Stain Aerobic Bottle Gram positive cocci         Anaerobic Bottle Gram positive cocci     Blood Culture - Blood, Arm, Left [[id number]]  (Abnormal)  (Susceptibility) Collected: 03/26/24 1346     Lab Status: Final result Specimen: Blood from Arm, Left Updated: 03/29/24 0513       Blood Culture Staphylococcus aureus, MRSA       Comment:    Infectious disease consultation is highly recommended to rule out distant foci of infection.  Methicillin resistant Staphylococcus aureus, Patient may be an isolation risk.          Isolated from Aerobic and Anaerobic Bottles       Gram Stain Aerobic Bottle Gram positive cocci         Anaerobic Bottle Gram positive cocci     Susceptibility                   Staphylococcus aureus, MRSA          MATT         Gentamicin Susceptible         Oxacillin Resistant         Rifampin Susceptible         Vancomycin Susceptible                                Susceptibility Comments         Staphylococcus aureus, MRSA     This isolate is presumed to be clindamycin resistant based on detection of inducible clindamycin resistance.  Clindamycin may still be effective in some patients.                  Blood Culture ID, PCR - Blood, Arm, Left [[id number]]  (Abnormal) Collected: 03/26/24 1346     Lab Status: Final result Specimen: Blood from Arm, Left Updated: 03/27/24 0426       BCID, PCR Staph aureus. mecA/C and MREJ (methicillin resistance gene) detected. Identification by BCID2 PCR.       BOTTLE TYPE Aerobic Bottle     Narrative:       Infectious disease consultation is highly recommended to rule out distant foci of infection.                PATHOLOGY RESULTS:        ASSESSMENT/PLAN   Diabetic foot ulcerations to left foot  Type 2 diabetes mellitus with hyperglycemia  Diabetic polyneuropathy  Sepsis   Metabolic encephalopathy secondary to sepsis-improving     Patient may bear weight with CAM boot when out of bed.      Dressing changed done today at bedside. Continue wound care twice daily with Betadine soaked gauze wet-to-dry to be changed by nursing.     Awaiting MRI of left foot to be performed to determine presence of osteomyelitis or abscess.     Antibiotic therapy per infectious disease.     Will continue to follow patient while admitted.        This document has been electronically signed by SUSAN Perez on March 29, 2024 12:58 CDT                      Cosigned by: Asad Cerrato DPM at 03/29/24 1629     Revision History             Julia Adrian MD   Physician  Infectious Disease     Progress Notes     Addendum     Date of Service: 03/29/24 1119  Creation Time: 03/29/24 1119     Expand All Collapse All[]Expand All by Default    INFECTIOUS DISEASES PROGRESS NOTE     Patient:  Erick DONATO  "Cherise  YOB: 1955  MRN: [id number]       Admit date: 3/26/2024             Admitting Physician: Rene Maloney MD  Primary Care Physician: Del Shetty MD     Chief Complaint: \"Doing okay\"           Interval History: Patient indicated me that he is \"doing okay\".  No further emesis.  Still has not had an MRI.  Per ELVIS Cabrera plans are to reattempt that today.     Allergies:   Allergies         Allergies   Allergen Reactions    Cefepime Hives and Anaphylaxis    Bactrim [Sulfamethoxazole-Trimethoprim] Other (See Comments)       \"RENAL FAILURE\"    Vancomycin Itching    Zolpidem Unknown - High Severity       \"makes him crazy\"    Zolpidem Tartrate Unknown - Low Severity and Provider Review Needed    Metronidazole Rash            Current Scheduled Medications:     Scheduled Medication   apixaban, 5 mg, Oral, Q12H  ascorbic acid, 1,000 mg, Oral, Daily  aspirin, 81 mg, Oral, Daily  bumetanide, 1 mg, Oral, Daily  calcitriol, 0.5 mcg, Oral, Daily  carvedilol, 3.125 mg, Oral, BID With Meals  donepezil, 10 mg, Oral, Daily  famotidine, 20 mg, Oral, BID AC  insulin detemir, 30 Units, Subcutaneous, Nightly  insulin lispro, 4-24 Units, Subcutaneous, 4x Daily AC & at Bedtime  insulin regular, 10 Units, Subcutaneous, TID AC  isosorbide mononitrate, 30 mg, Oral, Q24H  Linezolid, 600 mg, Intravenous, Q12H  melatonin, 6 mg, Oral, Nightly  midodrine, 2.5 mg, Oral, TID AC  mupirocin, 1 Application, Each Nare, BID  pregabalin, 100 mg, Oral, Nightly  pregabalin, 50 mg, Oral, Daily  rosuvastatin, 10 mg, Oral, Nightly  senna-docusate sodium, 1 tablet, Oral, BID  sodium chloride, 10 mL, Intravenous, Q12H  sucralfate, 1 g, Oral, TID AC  tamsulosin, 0.4 mg, Oral, Daily  timolol, 1 drop, Both Eyes, Q12H  vitamin D3, 5,000 Units, Oral, Daily  zinc sulfate, 220 mg, Oral, Daily         Current PRN Medications:    PRN Medication     acetaminophen **OR** acetaminophen **OR** acetaminophen    senna-docusate sodium **AND** " "polyethylene glycol **AND** bisacodyl **AND** bisacodyl    dextrose    dextrose    Diclofenac Sodium    glucagon (human recombinant)    haloperidol lactate    LORazepam    magnesium hydroxide    nitroglycerin    ondansetron ODT **OR** ondansetron    oxyCODONE    sodium chloride    sodium chloride    sodium chloride        Infusion Medications                   Objective  Vital Signs:  Temp (24hrs), Av.8 °F (37.1 °C), Min:98.2 °F (36.8 °C), Max:99.5 °F (37.5 °C)        /55 (BP Location: Left arm, Patient Position: Lying)   Pulse 78   Temp 99.3 °F (37.4 °C) (Oral)   Resp 16   Ht 182.9 cm (72\")   Wt 132 kg (290 lb 2 oz)   SpO2 100%   BMI 39.35 kg/m²            Physical Exam:  General: The patient is nontoxic-appearing lying in bed in no acute distress  Respiratory: Effort even and unlabored  Abdomen: Soft, nontender  Left lower extremity dressing clean dry and intact.  See photos  Neuro: Hard of hearing, alert, conversant                               Results Review:    I reviewed the patient's new clinical results.     Lab Results:     CBC:          Lab Results   Lab 24  1326 24  0449 24  1004 24  0551   WBC 14.69* 12.75* 9.45 6.08   HEMOGLOBIN 11.3* 11.8* 13.7 11.1*   HEMATOCRIT 33.8* 35.9* 41.1 33.6*   PLATELETS 171 163 154 156         AutoDiff:         Lab Results   Lab 24  0449 24  1004 24  0551   NEUTROPHIL % 86.5* 77.3* 70.8   LYMPHOCYTE % 5.2* 13.4* 15.5*   MONOCYTES % 7.5 7.9 11.0   EOSINOPHIL % 0.0* 0.7 1.5   BASOPHIL % 0.2 0.3 0.5   NEUTROS ABS 11.03* 7.29* 4.31   LYMPHS ABS 0.66* 1.27 0.94   MONOS ABS 0.95* 0.75 0.67   EOS ABS 0.00 0.07 0.09   BASOS ABS 0.03 0.03 0.03         Manual Diff:          Lab Results   Lab 24  0449 24  1004 24  0551   NEUTROS ABS 11.03* 7.29* 4.31               CMP:          Lab Results   Lab 24  1326 24  0449 24  1004 24  0551   SODIUM 140 142 140 138   POTASSIUM 3.4* 3.5 3.6 3.3* "   CHLORIDE 103 103 102 103   CO2 23.0 25.0 22.0 23.0   BUN 29* 29* 36* 40*   CREATININE 1.93* 2.07* 2.03* 2.07*   CALCIUM 9.5 8.9 9.2 8.4*   BILIRUBIN 0.7  --   --  0.7   ALK PHOS 91  --   --  81   ALT (SGPT) 8  --   --  17   AST (SGOT) 13  --   --  32   GLUCOSE 438* 169* 79 120*         Estimated Creatinine Clearance: 48.7 mL/min (A) (by C-G formula based on SCr of 2.07 mg/dL (H)).     Culture Results:     Microbiology Results (last 10 days)         Procedure Component Value - Date/Time     MRSA Screen, PCR (Inpatient) - Swab, Nares [[id number]]  (Abnormal) Collected: 03/28/24 2128     Lab Status: Final result Specimen: Swab from Nares Updated: 03/28/24 2239       MRSA PCR MRSA Detected     Narrative:       The negative predictive value of this diagnostic test is high and should only be used to consider de-escalating anti-MRSA therapy. A positive result may indicate colonization with MRSA and must be correlated clinically.     AMAN AURIS SCREEN - Swab, Axilla Right, Axilla Left and Groin [[id number]]  (Normal) Collected: 03/27/24 0351     Lab Status: Preliminary result Specimen: Swab from Axilla Right, Axilla Left and Groin Updated: 03/29/24 0416       Candida Auris Screen Culture No Candida auris isolated at 2 days     Respiratory Panel PCR w/COVID-19(SARS-CoV-2) [facility] In-House, NP Swab in UTM/VTM, 2 HR TAT - Swab, Nasopharynx [[id number]]  (Normal) Collected: 03/26/24 1732     Lab Status: Final result Specimen: Swab from Nasopharynx Updated: 03/26/24 1905       ADENOVIRUS, PCR Not Detected       Coronavirus 229E Not Detected       Coronavirus HKU1 Not Detected       Coronavirus NL63 Not Detected       Coronavirus OC43 Not Detected       COVID19 Not Detected       Human Metapneumovirus Not Detected       Human Rhinovirus/Enterovirus Not Detected       Influenza A PCR Not Detected       Influenza B PCR Not Detected       Parainfluenza Virus 1 Not Detected       Parainfluenza Virus 2 Not Detected        Parainfluenza Virus 3 Not Detected       Parainfluenza Virus 4 Not Detected       RSV, PCR Not Detected       Bordetella pertussis pcr Not Detected       Bordetella parapertussis PCR Not Detected       Chlamydophila pneumoniae PCR Not Detected       Mycoplasma pneumo by PCR Not Detected     Narrative:       In the setting of a positive respiratory panel with a viral infection PLUS a negative procalcitonin without other underlying concern for bacterial infection, consider observing off antibiotics or discontinuation of antibiotics and continue supportive care. If the respiratory panel is positive for atypical bacterial infection (Bordetella pertussis, Chlamydophila pneumoniae, or Mycoplasma pneumoniae), consider antibiotic de-escalation to target atypical bacterial infection.     Blood Culture - Blood, Arm, Right [[id number]]  (Abnormal) Collected: 03/26/24 1346     Lab Status: Final result Specimen: Blood from Arm, Right Updated: 03/29/24 0513       Blood Culture Staphylococcus aureus, MRSA       Comment:    Infectious disease consultation is highly recommended to rule out distant foci of infection.  Methicillin resistant Staphylococcus aureus, Patient may be an isolation risk.          Isolated from Aerobic and Anaerobic Bottles       Gram Stain Aerobic Bottle Gram positive cocci         Anaerobic Bottle Gram positive cocci     Blood Culture - Blood, Arm, Left [[id number]]  (Abnormal)  (Susceptibility) Collected: 03/26/24 1346     Lab Status: Final result Specimen: Blood from Arm, Left Updated: 03/29/24 0513       Blood Culture Staphylococcus aureus, MRSA       Comment:    Infectious disease consultation is highly recommended to rule out distant foci of infection.  Methicillin resistant Staphylococcus aureus, Patient may be an isolation risk.          Isolated from Aerobic and Anaerobic Bottles       Gram Stain Aerobic Bottle Gram positive cocci         Anaerobic Bottle Gram positive cocci     Susceptibility                    Staphylococcus aureus, MRSA         MATT         Gentamicin Susceptible         Oxacillin Resistant         Rifampin Susceptible         Vancomycin Susceptible                                Susceptibility Comments         Staphylococcus aureus, MRSA     This isolate is presumed to be clindamycin resistant based on detection of inducible clindamycin resistance.  Clindamycin may still be effective in some patients.                  Blood Culture ID, PCR - Blood, Arm, Left [[id number]]  (Abnormal) Collected: 03/26/24 1346     Lab Status: Final result Specimen: Blood from Arm, Left Updated: 03/27/24 0426       BCID, PCR Staph aureus. mecA/C and MREJ (methicillin resistance gene) detected. Identification by BCID2 PCR.       BOTTLE TYPE Aerobic Bottle     Narrative:       Infectious disease consultation is highly recommended to rule out distant foci of infection.                      Radiology:   Imaging Results (Last 72 Hours)         Procedure Component Value Units Date/Time     XR Foot 3+ View Left [[id number]] Collected: 03/26/24 1723       Updated: 03/26/24 1728     Narrative:       EXAMINATION: XR FOOT 3+ VW LEFT-     3/26/2024 4:19 PM     HISTORY: foot infection     3 view LEFT foot exam.     Previous amputation of the mid/distal fifth metatarsal.     Interval resection or resorption of the fourth metatarsal head.     No bone destruction or soft tissue air is seen.     High-grade degenerative disease at the first metatarsal phalangeal  joint.     Unchanged appearance of prominent dorsal midfoot spurring and prominent  inferior calcaneal spurring.     Mild soft tissue edema over the dorsum of the foot is less prominent as  compared with the previous exam.        Impression:       1. No bone destruction or soft tissue air is seen.  2. Prominent degenerative spurring.           This report was signed and finalized on 3/26/2024 5:25 PM by Dr. Gomez Mcgill MD.        XR Chest 1 View [[id number]]  Collected: 03/26/24 1433       Updated: 03/26/24 1437     Narrative:       EXAM: XR CHEST 1 VW-      DATE: 3/26/2024 1:15 PM     HISTORY: ams       COMPARISON: 8/28/2023.     TECHNIQUE:  Frontal view(s) of the chest submitted.     FINDINGS:    Patient is status post median sternotomy and CABG. There is enlargement  of the cardiac silhouette. There are low lung volumes with vascular  crowding versus mild volume overload. No effusion or pneumothorax is  seen.           Impression:          1. Postoperative chest and low lung volumes with vascular crowding  versus mild volume overload.     This report was signed and finalized on 3/26/2024 2:34 PM by Eran Christina.        CT Head Without Contrast [[id number]] Collected: 03/26/24 1408       Updated: 03/26/24 1413     Narrative:       EXAM: CT HEAD WO CONTRAST-      DATE: 3/26/2024 1:03 PM     HISTORY: ams       COMPARISON: 10/10/2023.     DOSE LENGTH PRODUCT: 876.8 mGy.cm  Automated exposure control was also  utilized to decrease patient radiation dose.     TECHNIQUE: Unenhanced CT images obtained from vertex to skull base with  multiplanar reformats.     FINDINGS:  There is no acute intracranial hemorrhage, midline shift, mass effect,  or hydrocephalus. There is no CT evidence for acute infarct.     There are chronic changes with volume loss and chronic small vessel  ischemic change of the periventricular white matter.      SOFT TISSUES: The scalp soft tissues are unremarkable.        SINUS: There is partially imaged mucosal thickening at the right  maxillary sinus.     ORBITS: The visualized orbits and globes are unremarkable. There is  bilateral lens extraction.           Impression:       1. Chronic changes and no acute intracranial findings.      This report was signed and finalized on 3/26/2024 2:10 PM by Eran Christina.                              Active Hospital Problems     Diagnosis      **Sepsis      Diabetic foot infection      Chronic kidney disease  (CKD), stage IV (severe)      Chronic diastolic heart failure      BPH without obstruction/lower urinary tract symptoms      Diabetic polyneuropathy associated with type 2 diabetes mellitus      Anemia due to chronic kidney disease      Essential hypertension      Type 2 diabetes mellitus with hyperglycemia, with long-term current use of insulin           IMPRESSION:  MRSA bacteremia-patient with history of MRSA infection right foot in past and any left foot wounds this admission and associated cellulitis.  Encephalopathy on admission-appears resolved  Chronic kidney disease stage IIIb  Type 2 diabetes mellitus-poorly controlled with hemoglobin A1c of 10.7 this admission.  MRSA nasal screen positive        RECOMMENDATION:   Continue linezolid  Wound care per Dr. Cerrato  Await MRI findings  Continue surveillance blood cultures-ordered 1 today and will order one for her in the morning  If has persistent bacteremia will need additional workup.  Glucose management per attending  Start intranasal Bactroban for MRSA nasal carriage        Julia Adrian MD  03/29/24  11:20 CDT                    Revision History

## 2024-04-01 NOTE — PAYOR COMM NOTE
"Erick Luong (68 y.o. Male)  [id number]   cont stay   please review clinical for additional days  [facility] phone    fax          Date of Birth   1955    Social Security Number       Address   [address] [place] [address]    Home Phone   [phone]    MRN   [id number]       Sabianist   Anglican    Marital Status                               Admission Date   3/26/24    Admission Type   Emergency    Admitting Provider   Latanya Paez MD    Attending Provider   Latanya Paez MD    Department, Room/Bed   [facility] 3C, 372/1       Discharge Date       Discharge Disposition       Discharge Destination                                 Attending Provider: Latanya Paez MD    Allergies: Cefepime, Bactrim [Sulfamethoxazole-trimethoprim], Vancomycin, Zolpidem, Zolpidem Tartrate, Metronidazole    Isolation: Contact   Infection: MRSA (05/19/19), COVID (History) (08/08/22)   Code Status: CPR    Ht: 182.9 cm (72\")   Wt: 134 kg (295 lb 6.7 oz)    Admission Cmt: None   Principal Problem: Sepsis [A41.9]                   Active Insurance as of 3/26/2024       Primary Coverage       Payor Plan Insurance Group Employer/Plan Group    [employer] [facility] EMPLOYEE [id number]       Payor Plan Address Payor Plan Phone Number Payor Plan Fax Number Effective Dates    [address] [phone]  1/1/2022 - None Entered    [facility] [id number]         Subscriber Name Subscriber Birth Date Member ID       ZAINAB LUONG 11/27/1969 [id number]               Secondary Coverage       Payor Plan Insurance Group Employer/Plan Group    MEDICARE MEDICARE A & B        Payor Plan Address Payor Plan Phone Number Payor Plan Fax Number Effective Dates    [address] [phone]  7/1/2013 - None Entered    [facility] [id number]         Subscriber Name Subscriber Birth Date Member ID       ERIKC LUONG 1955 [id number]               "       Emergency Contacts        (Rel.) Home Phone Work Phone Mobile Phone    Joan Luong (Spouse) [phone]              Current Facility-Administered Medications   Medication Dose Route Frequency Provider Last Rate Last Admin    acetaminophen (TYLENOL) tablet 650 mg  650 mg Oral Q4H PRN Raquel Duncan APRN   650 mg at 04/01/24 0446    Or    acetaminophen (TYLENOL) 160 MG/5ML oral solution 650 mg  650 mg Oral Q4H PRN Raquel Duncan APRN   650 mg at 03/27/24 2109    Or    acetaminophen (TYLENOL) suppository 650 mg  650 mg Rectal Q4H PRN Raquel Duncan APRN        apixaban (ELIQUIS) tablet 5 mg  5 mg Oral Q12H Raquel Duncan APRN   5 mg at 04/01/24 0802    ascorbic acid (VITAMIN C) tablet 1,000 mg  1,000 mg Oral Daily Rene Maloney MD   1,000 mg at 04/01/24 0804    aspirin EC tablet 81 mg  81 mg Oral Daily Rene Maloney MD   81 mg at 04/01/24 0802    sennosides-docusate (PERICOLACE) 8.6-50 MG per tablet 1 tablet  1 tablet Oral BID Raquel Duncan APRN   1 tablet at 04/01/24 0801    And    polyethylene glycol (MIRALAX) packet 17 g  17 g Oral Daily PRN Raquel Duncan APRN        And    bisacodyl (DULCOLAX) EC tablet 5 mg  5 mg Oral Daily PRN Raquel Duncan APRN        And    bisacodyl (DULCOLAX) suppository 10 mg  10 mg Rectal Daily PRN Raquel Duncan APRN        calcitriol (ROCALTROL) capsule 0.5 mcg  0.5 mcg Oral Daily Rene Maloney MD   0.5 mcg at 04/01/24 0804    carvedilol (COREG) tablet 3.125 mg  3.125 mg Oral BID With Meals Rene Maloney MD   3.125 mg at 04/01/24 0803    dextrose (D50W) (25 g/50 mL) IV injection 25 g  25 g Intravenous Q15 Min PRN Raquel Duncan APRN        dextrose (GLUTOSE) oral gel 15 g  15 g Oral Q15 Min PRN Raquel Duncan APRN   15 g at 03/27/24 1710    Diclofenac Sodium (VOLTAREN) 1 % gel 2 g  2 g Topical 4x Daily PRN Rene Maloney MD        dicyclomine (BENTYL) capsule 20 mg  20  mg Oral TID PRN Rene Maloney MD   20 mg at 03/31/24 1603    donepezil (ARICEPT) tablet 10 mg  10 mg Oral Daily Raquel Duncan APRN   10 mg at 04/01/24 0802    famotidine (PEPCID) tablet 20 mg  20 mg Oral Daily Rene Maloney MD   20 mg at 04/01/24 0803    glucagon (GLUCAGEN) injection 1 mg  1 mg Intramuscular Q15 Min PRN Raquel Duncan, APRSHERRILL        haloperidol lactate (HALDOL) injection 5 mg  5 mg Intravenous Q6H PRN Rene Maloney MD        insulin detemir (LEVEMIR) injection 30 Units  30 Units Subcutaneous Nightly Raquel Duncan APRN   30 Units at 03/31/24 2023    Insulin Lispro (humaLOG) injection 4-24 Units  4-24 Units Subcutaneous 4x Daily AC & at Bedtime Raquel Duncan APRN   12 Units at 04/01/24 0810    insulin regular (humuLIN R,novoLIN R) injection 10 Units  10 Units Subcutaneous TID AC Steve De Jesus MD   10 Units at 04/01/24 0810    isosorbide mononitrate (IMDUR) 24 hr tablet 30 mg  30 mg Oral Q24H Rene Maloney MD   30 mg at 04/01/24 0804    Linezolid (ZYVOX) 600 mg 300 mL  600 mg Intravenous Q12H Julia Adrian  mL/hr at 03/31/24 2331 600 mg at 03/31/24 2331    LORazepam (ATIVAN) injection 1 mg  1 mg Intravenous Q4H PRN Rene Maloney MD   1 mg at 03/29/24 0140    magnesium hydroxide (MILK OF MAGNESIA) suspension 10 mL  10 mL Oral Daily PRN Rene Maloney MD   10 mL at 03/31/24 1202    melatonin tablet 6 mg  6 mg Oral Nightly Rene Maloney MD   6 mg at 03/31/24 2024    midodrine (PROAMATINE) tablet 2.5 mg  2.5 mg Oral TID AC Rene Maloney MD   2.5 mg at 04/01/24 0803    Morphine sulfate (PF) injection 2 mg  2 mg Intravenous Q3H PRN Rene Maloney MD   2 mg at 03/31/24 1501    mupirocin (BACTROBAN) 2 % nasal ointment 1 Application  1 Application Each Nare BID Nadia Polo, APRN   1 Application at 04/01/24 0802    nitroglycerin (NITROSTAT) SL tablet 0.4 mg  0.4 mg Sublingual Q5 Min PRN Raquel Duncan, APRN         ondansetron ODT (ZOFRAN-ODT) disintegrating tablet 4 mg  4 mg Oral Q6H PRN Raquel Duncan, APRN   4 mg at 03/30/24 1748    Or    ondansetron (ZOFRAN) injection 4 mg  4 mg Intravenous Q6H PRN Raquel Duncan, APRN   4 mg at 03/29/24 1837    oxyCODONE (ROXICODONE) immediate release tablet 10 mg  10 mg Oral BID PRN Raquel Duncan, APRN   10 mg at 03/31/24 2036    pregabalin (LYRICA) capsule 100 mg  100 mg Oral Nightly Rene Maloney MD   100 mg at 03/31/24 2023    pregabalin (LYRICA) capsule 50 mg  50 mg Oral Daily Rene Maloney MD   50 mg at 04/01/24 0801    rosuvastatin (CRESTOR) tablet 10 mg  10 mg Oral Nightly Rene Maloney MD   10 mg at 03/31/24 2024    sodium chloride 0.9 % flush 10 mL  10 mL Intravenous PRN Steve De Jesus MD        sodium chloride 0.9 % flush 10 mL  10 mL Intravenous Q12H Raquel Duncan, SUSAN   10 mL at 04/01/24 0805    sodium chloride 0.9 % flush 10 mL  10 mL Intravenous PRN Raquel Duncan APRN        sodium chloride 0.9 % infusion 40 mL  40 mL Intravenous PRN Raquel Duncan, SUSAN        sucralfate (CARAFATE) 1 GM/10ML suspension 1 g  1 g Oral TID AC Rene Maloney MD   1 g at 04/01/24 0802    tamsulosin (FLOMAX) 24 hr capsule 0.4 mg  0.4 mg Oral Daily Rene Maloney MD   0.4 mg at 04/01/24 0804    timolol (TIMOPTIC) 0.25 % ophthalmic solution 1 drop  1 drop Both Eyes Q12H Rene Maloney MD   1 drop at 04/01/24 0804    vitamin D3 capsule 5,000 Units  5,000 Units Oral Daily Rene Maloney MD   5,000 Units at 04/01/24 0802    zinc sulfate (ZINCATE) capsule 220 mg  220 mg Oral Daily Rene Maloney MD   220 mg at 04/01/24 0802        Physician Progress Notes (last 48 hours)        Brian Lomas MD at 03/31/24 1505          Nephrology ([facility]) Progress Note      Patient:  Erick Luong  YOB: 1955  Date of Service: 3/31/2024  MRN: [id number]   Acct: [id number]   Primary Care Physician:  Del Shetty MD  Advance Directive:   Code Status and Medical Interventions:   Ordered at: 03/26/24 1815     Level Of Support Discussed With:    Health Care Surrogate     Code Status (Patient has no pulse and is not breathing):    CPR (Attempt to Resuscitate)     Medical Interventions (Patient has pulse or is breathing):    Full Support     Admit Date: 3/26/2024       Hospital Day: 5  Referring Provider: No ref. provider found      Patient personally seen and examined.  Complete chart including Consults, Notes, Operative Reports, Labs, Cardiology, and Radiology studies reviewed as able.    Chief complaint: Abnormal labs.    Subjective:  Erick Luong is a 68 y.o. male for whom we were consulted for evaluation and treatment of acute kidney injury.  He has stage IIIb chronic kidney disease baseline, follows with Dr Lomas in our office.  Baseline creatinine approximately 1.8. He has history of insulin-dependent type 2 diabetes, hypertension, abdominal obesity, obstructive sleep apnea, diabetic foot ulcer and coronary artery disease.   Patient presented to ER on 3/26 with altered mental status. Had debridement of ongoing wound on left foot the day prior. Left foot warm and erythremic on arrival to ER. Noted to have elevated blood glucose over 400. Initial creatinine of 1.9.  Admitted to medical floor for sepsis due to left foot wound. Patient has been agitated and confused during the admission, requiring wrist restraints due to pulling at lines and tubes.     This afternoon patient is more alert and awake.  He denies any shortness of breath or dyspnea on exertion.  However he is still very weak    Allergies:  Cefepime, Bactrim [sulfamethoxazole-trimethoprim], Vancomycin, Zolpidem, Zolpidem tartrate, and Metronidazole    Home Meds:  Medications Prior to Admission   Medication Sig Dispense Refill Last Dose    amitriptyline (ELAVIL) 25 MG tablet Take 2 tablets by mouth Daily at bedtime. (Patient not taking: Reported on  3/27/2024) 60 tablet 1 Not Taking    apixaban (ELIQUIS) 5 MG tablet tablet Take 1 tablet by mouth Every 12 (Twelve) Hours.       ascorbic acid (VITAMIN C) 1000 MG tablet Take 1 tablet by mouth Daily. 30 tablet 3     Aspirin 81 MG capsule Take 81 mg by mouth Daily.       bumetanide (BUMEX) 1 MG tablet Take 1 tablet every day by oral route for 30 days. 30 tablet 0     bumetanide (BUMEX) 2 MG tablet Take 1 tablet by mouth Daily. Take 2 tablets in morning;1 tablet at night (Patient not taking: Reported on 3/27/2024)   Not Taking    busPIRone (BUSPAR) 10 MG tablet Take 1 tablet by mouth 3 (Three) Times a Day.       calcitriol (ROCALTROL) 0.5 MCG capsule Take 1 capsule by mouth Daily. 90 capsule 4     calcitriol (ROCALTROL) 0.5 MCG capsule Take 1 capsule every day by oral route for 90 days. (Patient not taking: Reported on 3/27/2024) 90 capsule 4 Not Taking    carvedilol (COREG) 3.125 MG tablet Take 1 tablet twice a day by oral route. 60 tablet 4     carvedilol (COREG) 6.25 MG tablet Take 1 tablet by mouth 2 (Two) Times a Day. (Patient not taking: Reported on 3/27/2024) 180 tablet 4 Not Taking    Cholecalciferol (D-5000) 125 MCG (5000 UT) tablet Take 1 tablet by mouth Daily Before Lunch. 30 tablet 2     cloNIDine (CATAPRES) 0.1 MG tablet Take 1 tablet by mouth Every 12 (Twelve) Hours. (Patient not taking: Reported on 3/27/2024) 60 tablet 2 Not Taking    Diclofenac Sodium (VOLTAREN) 1 % gel gel Apply 2 g topically to the appropriate area as directed 4 (Four) Times a Day As Needed. 300 g 11     Diclofenac Sodium (VOLTAREN) 1 % gel gel Apply 2 g topically to the appropriate area as directed 4 (Four) Times a Day. (Patient not taking: Reported on 3/27/2024) 300 g 11 Not Taking    donepezil (ARICEPT) 10 MG tablet Take 1 tablet by mouth Daily. (Patient not taking: Reported on 3/27/2024) 90 tablet 4 Not Taking    Dulaglutide (Trulicity) 4.5 MG/0.5ML solution pen-injector Inject 0.5 mL under the skin into the appropriate area as  directed 1 (One) Time Per Week. (Patient not taking: Reported on 3/27/2024) 2 mL 11 Not Taking    DULoxetine (CYMBALTA) 60 MG capsule Take 1 capsule by mouth Daily. 90 capsule 4     DULoxetine (CYMBALTA) 60 MG capsule Take 1 capsule by mouth Daily. (Patient not taking: Reported on 3/27/2024) 90 capsule 4 Not Taking    DULoxetine (CYMBALTA) 60 MG capsule Take 1 capsule by mouth Daily. (Patient not taking: Reported on 3/27/2024) 90 capsule 4 Not Taking    empagliflozin (JARDIANCE) 25 MG tablet tablet Take 1 tablet by mouth Daily. (Patient not taking: Reported on 3/27/2024) 30 tablet 2 Not Taking    famotidine (PEPCID) 20 MG tablet Take 1 tablet twice a day by oral route. 180 tablet 4     fluticasone (FLONASE) 50 MCG/ACT nasal spray 1 spray into the nostril(s) as directed by provider Daily. (Patient taking differently: 1 spray into the nostril(s) as directed by provider 2 (Two) Times a Day.) 16 g 1     HYDROcodone-acetaminophen (NORCO) 7.5-325 MG per tablet Take 1 tablet by mouth 2 (Two) Times a Day As Needed. (Patient not taking: Reported on 3/27/2024) 40 tablet 0 Not Taking    Insulin Glargine (Lantus SoloStar) 100 UNIT/ML injection pen Inject 20 Units under the skin into the appropriate area as directed Every Evening. 15 mL 3     Insulin Regular Human, Conc, (HumuLIN R U-500 KwikPen) 500 UNIT/ML solution pen-injector CONCENTRATED injection Inject 120 Units under the skin into the appropriate area as directed 2 (Two) Times a Day with breakfast and dinner. (Patient not taking: Reported on 3/27/2024) 18 mL 5 Not Taking    Insulin Regular Human, Conc, (HumuLIN R U-500 KwikPen) 500 UNIT/ML solution pen-injector CONCENTRATED injection Inject 40 Units under the skin into the appropriate area with regular meals AND 40 Units with large meals. 54 mL 3     isosorbide mononitrate (IMDUR) 30 MG 24 hr tablet Take 1 tablet by mouth Daily.       magnesium hydroxide (Milk of Magnesia) 400 MG/5ML suspension Take 30 mL every day by  oral route for 30 days. 900 mL 0     magnesium hydroxide (Milk of Magnesia) 400 MG/5ML suspension Take 30mL by mouth once daily for 30 days. (Patient not taking: Reported on 3/27/2024) 900 mL 0 Not Taking    melatonin 3 MG tablet Take 1 tablet by mouth At Night As Needed for Sleep.       methylcellulose (Citrucel) oral powder Mix 5g as directed and drink twice daily for 90 days. 900 g 10     metoclopramide (REGLAN) 5 MG tablet Take 1 tablet by mouth 3 times a day.       midodrine (PROAMATINE) 2.5 MG tablet Take 1 tablet by mouth 3 (Three) Times a Day Before Meals.       nitroglycerin (NITROSTAT) 0.4 MG SL tablet Dissolve 1 tablet by mouth every 5 minutes as needed for chest pain; MAX 3 tabs/24 hours.  If no improvement after 3 tabs go to ER 25 tablet 0     ondansetron (ZOFRAN) 4 MG tablet Take 1 tablet by mouth Every 6 (Six) Hours As Needed for Nausea or Vomiting.       oxyCODONE (ROXICODONE) 10 MG tablet Take 1 tablet by mouth twice a day as needed 60 tablet 0     pantoprazole (Protonix) 40 MG EC tablet Take 1 tablet by mouth 2 (Two) Times a Day. (Patient not taking: Reported on 3/27/2024) 180 tablet 4 Not Taking    polyethylene glycol (MIRALAX) 17 g packet Take 17 g by mouth Daily. Obtain OTC       prazosin (MINIPRESS) 1 MG capsule Take 1 capsule by mouth every night at bedtime. 30 capsule 2     pregabalin (LYRICA) 100 MG capsule Take 2 capsules by mouth Every Night.       pregabalin (LYRICA) 50 MG capsule Take 1 capsule by mouth Daily.       rosuvastatin (CRESTOR) 10 MG tablet Take 1 tablet by mouth Daily.       sennosides-docusate (PERICOLACE) 8.6-50 MG per tablet Take 1 tablet by mouth Every Night. Obtain OTC       sennosides-docusate (PERICOLACE) 8.6-50 MG per tablet Take 1 tablet by mouth every night at bedtime. (Patient not taking: Reported on 3/27/2024) 30 tablet 2 Not Taking    sodium hypochlorite (DAKIN'S 1/4 STRENGTH) 0.125 % solution topical solution 0.125% Apply to affected area twice daily (Patient  not taking: Reported on 3/27/2024) 473 mL 3 Not Taking    sodium hypochlorite (DAKIN'S 1/4 STRENGTH) 0.125 % solution topical solution 0.125% Apply to affected area twice daily as directed (Patient not taking: Reported on 3/27/2024) 473 mL 5 Not Taking    sodium hypochlorite (DAKIN'S) 0.25 % topical solution Use to wound daily as instructed 473 mL 1     sucralfate (CARAFATE) 1 g tablet Take 1 tablet by mouth 3 times a day.       tamsulosin (FLOMAX) 0.4 MG capsule 24 hr capsule Take 1 capsule by mouth Daily. 90 capsule 1     timolol (TIMOPTIC) 0.5 % ophthalmic solution Administer 1 drop to both eyes 2 (Two) Times a Day.       valsartan (DIOVAN) 40 MG tablet Take 1 tablet by mouth Daily.       zinc sulfate (ZINCATE) 50 MG capsule Take 1 capsule by mouth Daily.          Medicines:  Current Facility-Administered Medications   Medication Dose Route Frequency Provider Last Rate Last Admin    acetaminophen (TYLENOL) tablet 650 mg  650 mg Oral Q4H PRN Raquel Duncan APRN   650 mg at 03/31/24 1416    Or    acetaminophen (TYLENOL) 160 MG/5ML oral solution 650 mg  650 mg Oral Q4H PRN Raquel Duncan APRN   650 mg at 03/27/24 2109    Or    acetaminophen (TYLENOL) suppository 650 mg  650 mg Rectal Q4H PRN Raquel Duncan APRN        apixaban (ELIQUIS) tablet 5 mg  5 mg Oral Q12H Raquel Duncan APRN   5 mg at 03/31/24 0833    ascorbic acid (VITAMIN C) tablet 1,000 mg  1,000 mg Oral Daily Rene Maloney MD   1,000 mg at 03/31/24 0832    aspirin EC tablet 81 mg  81 mg Oral Daily Rene Maloney MD   81 mg at 03/31/24 0833    sennosides-docusate (PERICOLACE) 8.6-50 MG per tablet 1 tablet  1 tablet Oral BID Raquel Duncan APRN   1 tablet at 03/28/24 2126    And    polyethylene glycol (MIRALAX) packet 17 g  17 g Oral Daily PRN Raquel Duncan APRN        And    bisacodyl (DULCOLAX) EC tablet 5 mg  5 mg Oral Daily PRN Raquel Duncan APRN        And    bisacodyl (DULCOLAX) suppository 10 mg  10 mg  Rectal Daily PRN Raquel Duncan, APRN        calcitriol (ROCALTROL) capsule 0.5 mcg  0.5 mcg Oral Daily Rene Maloney MD   0.5 mcg at 03/31/24 0833    carvedilol (COREG) tablet 3.125 mg  3.125 mg Oral BID With Meals Rene Maloney MD   3.125 mg at 03/30/24 1748    dextrose (D50W) (25 g/50 mL) IV injection 25 g  25 g Intravenous Q15 Min PRN Raquel Duncan, SUSAN        dextrose (GLUTOSE) oral gel 15 g  15 g Oral Q15 Min PRN Raquel Duncan APRN   15 g at 03/27/24 1710    Diclofenac Sodium (VOLTAREN) 1 % gel 2 g  2 g Topical 4x Daily PRN Rene Maloney MD        dicyclomine (BENTYL) capsule 20 mg  20 mg Oral TID PRN Rene Maloney MD        donepezil (ARICEPT) tablet 10 mg  10 mg Oral Daily Raquel Duncan APRN   10 mg at 03/31/24 0831    famotidine (PEPCID) tablet 20 mg  20 mg Oral Daily Rene Maloney MD   20 mg at 03/31/24 0833    glucagon (GLUCAGEN) injection 1 mg  1 mg Intramuscular Q15 Min PRN Raquel Duncan APRN        haloperidol lactate (HALDOL) injection 5 mg  5 mg Intravenous Q6H PRN Rene Maloney MD        insulin detemir (LEVEMIR) injection 30 Units  30 Units Subcutaneous Nightly Raquel Duncan APRN   30 Units at 03/30/24 2034    Insulin Lispro (humaLOG) injection 4-24 Units  4-24 Units Subcutaneous 4x Daily AC & at Bedtime Raquel Duncan APRN   4 Units at 03/31/24 1202    insulin regular (humuLIN R,novoLIN R) injection 10 Units  10 Units Subcutaneous TID AC Steve De Jesus MD   10 Units at 03/31/24 1202    isosorbide mononitrate (IMDUR) 24 hr tablet 30 mg  30 mg Oral Q24H Rene Maloney MD   30 mg at 03/31/24 0833    Linezolid (ZYVOX) 600 mg 300 mL  600 mg Intravenous Q12H Julia Adrian  mL/hr at 03/31/24 1207 600 mg at 03/31/24 1207    LORazepam (ATIVAN) injection 1 mg  1 mg Intravenous Q4H PRN Rene Maloney MD   1 mg at 03/29/24 0140    magnesium hydroxide (MILK OF MAGNESIA) suspension 10 mL  10 mL Oral Daily PRN  Rene Maloney MD   10 mL at 03/31/24 1202    melatonin tablet 6 mg  6 mg Oral Nightly Rene Maloney MD   6 mg at 03/30/24 2032    midodrine (PROAMATINE) tablet 2.5 mg  2.5 mg Oral TID AC Rene Maloney MD   2.5 mg at 03/31/24 1202    Morphine sulfate (PF) injection 2 mg  2 mg Intravenous Q3H PRN Rene Maloney MD   2 mg at 03/31/24 1501    mupirocin (BACTROBAN) 2 % nasal ointment 1 Application  1 Application Each Nare BID Nadia Polo K, APRN   1 Application at 03/31/24 0832    nitroglycerin (NITROSTAT) SL tablet 0.4 mg  0.4 mg Sublingual Q5 Min PRN Raquel Duncan, APRN        ondansetron ODT (ZOFRAN-ODT) disintegrating tablet 4 mg  4 mg Oral Q6H PRN Raquel Dunacn, APRN   4 mg at 03/30/24 1748    Or    ondansetron (ZOFRAN) injection 4 mg  4 mg Intravenous Q6H PRN Raquel Duncan, APRN   4 mg at 03/29/24 1837    oxyCODONE (ROXICODONE) immediate release tablet 10 mg  10 mg Oral BID PRN Raquel Duncan, APRN   10 mg at 03/31/24 0558    pregabalin (LYRICA) capsule 100 mg  100 mg Oral Nightly Rene Maloney MD   100 mg at 03/30/24 2032    pregabalin (LYRICA) capsule 50 mg  50 mg Oral Daily Rene Maloney MD   50 mg at 03/31/24 0834    rosuvastatin (CRESTOR) tablet 10 mg  10 mg Oral Nightly Rene Maloney MD   10 mg at 03/30/24 2032    sodium chloride 0.9 % flush 10 mL  10 mL Intravenous PRN Steve De Jesus MD        sodium chloride 0.9 % flush 10 mL  10 mL Intravenous Q12H Raquel Duncan, APRN   10 mL at 03/31/24 0835    sodium chloride 0.9 % flush 10 mL  10 mL Intravenous PRN Raquel Duncan, SUSAN        sodium chloride 0.9 % infusion 40 mL  40 mL Intravenous PRN Raquel Duncan, APRN        sucralfate (CARAFATE) 1 GM/10ML suspension 1 g  1 g Oral TID AC Rene Maloney MD   1 g at 03/31/24 1207    tamsulosin (FLOMAX) 24 hr capsule 0.4 mg  0.4 mg Oral Daily Rene Maloney MD   0.4 mg at 03/31/24 0846    timolol (TIMOPTIC) 0.25 % ophthalmic  solution 1 drop  1 drop Both Eyes Q12H Rene Maloney MD   1 drop at 03/31/24 0835    vitamin D3 capsule 5,000 Units  5,000 Units Oral Daily Rene Maloney MD   5,000 Units at 03/31/24 0832    zinc sulfate (ZINCATE) capsule 220 mg  220 mg Oral Daily Rene Maloney MD   220 mg at 03/31/24 0831       Past Medical History:  Past Medical History:   Diagnosis Date    Arthritis     Autonomic disease     CAD (coronary artery disease) 02/06/2017    Cervical radiculopathy 09/16/2021    Chronic constipation with acute exaccerbation 05/10/2021    Coronary artery disease     Degeneration of cervical intervertebral disc 08/11/2021    Diabetes mellitus     Diabetic foot ulcer 08/31/2020    Diabetic polyneuropathy associated with type 2 diabetes mellitus 01/18/2021    Elevated cholesterol     Gastroesophageal reflux disease 05/13/2019    Headache     HTN (hypertension), benign 05/03/2017    Hyperlipidemia     Hypertension     Mixed hyperlipidemia 02/07/2017    Multiple lung nodules 01/26/2020    5mm, 9 mm RLL identified 1/2020, not present 10/2019.    Myocardial infarction     Osteomyelitis 01/22/2020    Osteomyelitis of fifth toe of right foot 10/07/2019    Pancreatitis     Persistent insomnia 01/20/2020    Renal disorder     Sleep apnea 02/06/2017    Sleep apnea with use of continuous positive airway pressure (CPAP)     NON-COMPLIANT    Slow transit constipation 01/16/2019    Spinal stenosis in cervical region 09/16/2021    Vitamin D deficiency 03/02/2021       Past Surgical History:  Past Surgical History:   Procedure Laterality Date    ABDOMINAL SURGERY      AMPUTATION FOOT / TOE Left 10/2021    5th digit     ANTERIOR CERVICAL DISCECTOMY W/ FUSION N/A 8/5/2022    Procedure: CERVICAL DISCECTOMY ANTERIOR WITH FUSION C5-6 with possible plating of C5-7 with neuromonitoring and 1 c-arm;  Surgeon: Karel Soliz MD;  Location: [facility];  Service: Neurosurgery;  Laterality: N/A;    APPENDECTOMY      BACK SURGERY       CARDIAC CATHETERIZATION Left 2021    Procedure: Left Heart Cath w poss intervention left anatomical snuff box acess;  Surgeon: Omkar Charles DO;  Location:  [facility];  Service: Cardiology;  Laterality: Left;    CARDIAC SURGERY      CATARACT EXTRACTION      CERVICAL SPINE SURGERY      COLONOSCOPY N/A 2017    Normal exam repeat in 5 years    COLONOSCOPY N/A 2019    Mild acute inflammation    COLONOSCOPY W/ POLYPECTOMY  2014    Hyperplastic polyp    CORONARY ARTERY BYPASS GRAFT  10/2015    ENDOSCOPY  2011    Gastritis with hemorrhage    ENDOSCOPY N/A 2017    Normal exam    ENDOSCOPY N/A 2019    Gastritis    ENDOSCOPY N/A 2020    Non-erosive gastritis with hemorrhage    ENDOSCOPY N/A 02/10/2021    Esophagitis    FOOT SURGERY Left     INCISION AND DRAINAGE OF WOUND Left 2007    spider bite       Family History  Family History   Problem Relation Age of Onset    Colon cancer Father     Heart disease Father     Colon cancer Sister     Colon polyps Sister     Alzheimer's disease Mother     Coronary artery disease Sister     Coronary artery disease Sister        Social History  Social History     Socioeconomic History    Marital status:    Tobacco Use    Smoking status: Former     Current packs/day: 0.00     Types: Cigarettes     Quit date:      Years since quittin.2    Smokeless tobacco: Never    Tobacco comments:     smoked in [employer]   Vaping Use    Vaping status: Never Used   Substance and Sexual Activity    Alcohol use: No    Drug use: No    Sexual activity: Defer       Review of Systems:  History obtained from chart review and the patient  General ROS: No fever or chills  Respiratory ROS: No cough, shortness of breath, wheezing  Cardiovascular ROS: No chest pain or palpitations  Gastrointestinal ROS: No abdominal pain or melena  Genito-Urinary ROS: No dysuria or hematuria  Psych ROS: No anxiety and depression  14  point ROS reviewed with the patient and negative except as noted above and in the HPI unless unable to obtain.    Objective:  Patient Vitals for the past 24 hrs:   BP Temp Temp src Pulse Resp SpO2   03/31/24 1154 114/50 98.4 °F (36.9 °C) Axillary 67 16 95 %   03/31/24 0816 111/57 97.6 °F (36.4 °C) Axillary 64 16 99 %   03/31/24 0610 120/60 97.6 °F (36.4 °C) Oral 63 16 98 %   03/30/24 2017 120/62 97.7 °F (36.5 °C) Oral 81 16 98 %   03/30/24 1628 -- 97.8 °F (36.6 °C) Axillary -- -- --       Intake/Output Summary (Last 24 hours) at 3/31/2024 1505  Last data filed at 3/31/2024 1400  Gross per 24 hour   Intake 240 ml   Output 2900 ml   Net -2660 ml     General: awake/alert   HEENT: Normocephalic atraumatic head  Neck: Supple with no JVD or carotid bruits  Chest:  clear to auscultation bilaterally without respiratory distress  CVS: regular rate and rhythm  Abdominal: soft, nontender, positive bowel sounds  Extremities:  bilateral 1+ edema  Skin: warm and dry without rash      Labs:  Results from last 7 days   Lab Units 03/31/24  0526 03/30/24  0620 03/29/24  0551   WBC 10*3/mm3 5.01 5.69 6.08   HEMOGLOBIN g/dL 10.3* 10.0* 11.1*   HEMATOCRIT % 31.6* 30.6* 33.6*   PLATELETS 10*3/mm3 149 155 156         Results from last 7 days   Lab Units 03/31/24  0526 03/30/24  0620 03/29/24  0551   SODIUM mmol/L 136 135* 138   POTASSIUM mmol/L 3.6 3.5 3.3*   CHLORIDE mmol/L 101 100 103   CO2 mmol/L 23.0 24.0 23.0   BUN mg/dL 51* 51* 40*   CREATININE mg/dL 2.52* 2.74* 2.07*   CALCIUM mg/dL 8.6 8.2* 8.4*   EGFR mL/min/1.73 27.2* 24.6* 34.5*   BILIRUBIN mg/dL 0.5 0.5 0.7   ALK PHOS U/L 110 95 81   ALT (SGPT) U/L 19 19 17   AST (SGOT) U/L 30 32 32   GLUCOSE mg/dL 201* 104* 120*       Radiology:   Imaging Results (Last 72 Hours)       Procedure Component Value Units Date/Time    MRI Foot Left With & Without Contrast [[id number]] Collected: 03/29/24 1856     Updated: 03/29/24 1901    Narrative:      EXAMINATION: MRI FOOT LEFT W WO CONTRAST-      3/29/2024 4:30 PM     HISTORY: Foot swelling, diabetic, osteomyelitis suspected, xray done;  E11.628-Type 2 diabetes mellitus with other skin complications;  L08.9-Local infection of the skin and subcutaneous tissue, unspecified;  E11.65-Type 2 diabetes mellitus with hyperglycemia; Z74.09-Other reduced  mobility     LEFT foot MRI without and with IV gadolinium contrast.  Axial, sagittal, and coronal sequences.     COMPARISON:  LEFT foot x-rays from 3/26/2024.     There is a tiny metal foreign body within the plantar soft tissues  adjacent to the second toe and despite the small size it produces a  prominent amount of artifact related to image distortion.     I see no soft tissue abscess.     No discrete bone edema or bone enhancement is seen to indicate  osteomyelitis.       Impression:      1. There are some limitations to the study based on metal related  artifact.  2. No soft tissue abscess or definite bone marrow edema or enhancement  is seen to indicate osteomyelitis.           This report was signed and finalized on 3/29/2024 6:58 PM by Dr. Gomez Mcgill MD.               Culture:  Blood Culture   Date Value Ref Range Status   03/26/2024 Staphylococcus aureus, MRSA (C)  Final     Comment:       Infectious disease consultation is highly recommended to rule out distant foci of infection.  Methicillin resistant Staphylococcus aureus, Patient may be an isolation risk.   03/26/2024 Staphylococcus aureus, MRSA (C)  Final     Comment:       Infectious disease consultation is highly recommended to rule out distant foci of infection.  Methicillin resistant Staphylococcus aureus, Patient may be an isolation risk.         Assessment    Acute kidney injury,/improving.  Acute tubular necrosis.  Baseline chronic kidney disease stage 3b  Type 2 diabetes, poorly controlled (A1c 10.8%)  Hypertension   Metabolic encephalopathy--improving  Anemia of CKD  Hypokalemia--improved  Sepsis due to infection of left foot  wound    Plan:  Encourage him to drink more water.  Hold diuretic at this point.  Monitor renal functions      Brian Lomas MD  3/31/2024  15:05 CDT      Electronically signed by Brian Lomas MD at 03/31/24 1506       Rene Maloney MD at 03/31/24 6740              [facility] Medicine Services  INPATIENT PROGRESS NOTE    Patient Name: Erick Luong  Date of Admission: 3/26/2024  Today's Date: 03/31/24  Length of Stay: 5  Primary Care Physician: Del Shetty MD    Subjective   Chief Complaint: Altered mental status    HPI   Patient is alert and oriented x 3.  He appears to be in good spirits.  He is requesting for discharge.  He was counseled that we will need to wait until later in this week, after we set up IV antibiotics before we can discharge him.      Review of Systems   All pertinent negatives and positives are as above. All other systems have been reviewed and are negative unless otherwise stated.     Objective    Temp:  [97.6 °F (36.4 °C)-98.4 °F (36.9 °C)] 98.4 °F (36.9 °C)  Heart Rate:  [63-81] 67  Resp:  [16] 16  BP: (111-120)/(50-67) 114/50  Physical Exam  Constitutional:       General: He is not in acute distress.     Appearance: He is well-developed. He is not diaphoretic.   HENT:      Head: Normocephalic.   Eyes:      General: No scleral icterus.     Conjunctiva/sclera: Conjunctivae normal.      Pupils: Pupils are equal, round, and reactive to light.   Neck:      Thyroid: No thyromegaly.      Vascular: No JVD.      Trachea: No tracheal deviation.   Cardiovascular:      Rate and Rhythm: Normal rate and regular rhythm.      Heart sounds: Normal heart sounds. No murmur heard.     No friction rub. No gallop.   Pulmonary:      Effort: Pulmonary effort is normal. No respiratory distress.      Breath sounds: Normal breath sounds. No stridor. No wheezing or rales.   Chest:      Chest wall: No tenderness.   Abdominal:      General: Bowel sounds are normal. There is  no distension.      Palpations: Abdomen is soft. There is no mass.      Tenderness: There is no abdominal tenderness. There is no guarding or rebound.      Hernia: No hernia is present.   Musculoskeletal:         General: Swelling and signs of injury present. No tenderness or deformity. Normal range of motion.      Cervical back: Normal range of motion and neck supple.      Right lower leg: No edema.      Comments: Dressing over left foot noted.   Lymphadenopathy:      Cervical: No cervical adenopathy.   Skin:     General: Skin is warm and dry.      Coloration: Skin is not pale.      Findings: No erythema or rash.   Neurological:      General: No focal deficit present.      Mental Status: He is alert and oriented to person, place, and time.      Cranial Nerves: No cranial nerve deficit.      Sensory: No sensory deficit.      Motor: No abnormal muscle tone.      Coordination: Coordination normal.   Psychiatric:         Mood and Affect: Mood normal.         Behavior: Behavior normal.            File Link    Scan on 3/26/2024 1423 by Ro Vinson RN: Wound 06/15/23 0102 Left anterior plantar        Key Information    Document ID File Type Document Type Description   [id number] Image WOUND IMAGE Wound 06/15/23 0102 Left anterior plantar     Import Information    Attached At Date Time User Dept   Encounter Level 3/26/2024  2:23 PM Ro Vinson RN [facility] Emergency Dept     Encounter    Hospital Encounter on 3/26/24     Results Review:  I have reviewed the labs, radiology results, and diagnostic studies.    Laboratory Data:   Results from last 7 days   Lab Units 03/31/24  0526 03/30/24  0620 03/29/24  0551   WBC 10*3/mm3 5.01 5.69 6.08   HEMOGLOBIN g/dL 10.3* 10.0* 11.1*   HEMATOCRIT % 31.6* 30.6* 33.6*   PLATELETS 10*3/mm3 149 155 156        Results from last 7 days   Lab Units 03/31/24  0526 03/30/24  0620 03/29/24  0551   SODIUM mmol/L 136 135* 138   POTASSIUM mmol/L 3.6 3.5 3.3*   CHLORIDE mmol/L 101 100  103   CO2 mmol/L 23.0 24.0 23.0   BUN mg/dL 51* 51* 40*   CREATININE mg/dL 2.52* 2.74* 2.07*   CALCIUM mg/dL 8.6 8.2* 8.4*   BILIRUBIN mg/dL 0.5 0.5 0.7   ALK PHOS U/L 110 95 81   ALT (SGPT) U/L 19 19 17   AST (SGOT) U/L 30 32 32   GLUCOSE mg/dL 201* 104* 120*       Culture Data:   Blood Culture   Date Value Ref Range Status   03/26/2024 Abnormal Stain (C)  Preliminary   03/26/2024 Abnormal Stain (C)  Preliminary       Radiology Data:   Imaging Results (Last 24 Hours)       ** No results found for the last 24 hours. **            I have reviewed the patient's current medications.     Assessment/Plan   Assessment  Active Hospital Problems    Diagnosis     **Sepsis     Diabetic foot infection     Chronic kidney disease (CKD), stage IV (severe)     Chronic diastolic heart failure     BPH without obstruction/lower urinary tract symptoms     Diabetic polyneuropathy associated with type 2 diabetes mellitus     Anemia due to chronic kidney disease     Essential hypertension     Type 2 diabetes mellitus with hyperglycemia, with long-term current use of insulin    MRSA bacteremia  Metabolic encephalopathy secondary to sepsis  WANDER on CKD    Treatment Plan  Continue IV antibiotics with Zyvox. Infectious disease input appreciated.  Mental status appears back to baseline now that sepsis is better controlled.  However he has persistently positive blood cultures.  Plan for 2D echo on Monday.  Will hold off on inserting PICC line for now until blood cultures are clear.    Podiatry consultation input appreciated.  Will follow recommendations.  Continue wound dressings as per podiatry Follow-up repeat blood cultures.    Nephrology and urology input appreciated.  Hold Bumex due to elevated creatinine and BUN.  No signs of urinary retention at this time.    Pain control with opiate pain medications.    Insulin sliding scale and Levemir for type 2 diabetes mellitus    DVT prophylaxis with Eliquis    PT/OT/wound care consultations    DVT  "prophylaxis with Eliquis    CODE STATUS is full code    Medical Decision Making  Number and Complexity of problems: 4 acute, severe, high complexity medical problems.  Multiple chronic medical problems.  Differential Diagnosis: None    Conditions and Status        Condition is worsening.     [facility] Data  External documents reviewed: None  Cardiac tracing (EKG, telemetry) interpretation: None  Radiology interpretation: None  Labs reviewed: CBC, BMP, blood cultures reviewed by me  Any tests that were considered but not ordered: None     Decision rules/scores evaluated (example [id number], Wells, etc): N/A     Discussed with: Patient     Care Planning  Shared decision making: Patient and his wife  Code status and discussions: CODE STATUS is full code    Disposition  Social Determinants of Health that impact treatment or disposition: None  I expect the patient to be discharged to home in 3 to 5 days.     Electronically signed by Rene Maloney MD, 03/31/24, 13:48 CDT.     Electronically signed by Rene Maloney MD at 03/31/24 1351       Julia Adrian MD at 03/31/24 0943          INFECTIOUS DISEASES PROGRESS NOTE    Patient:  Erick Luong  YOB: 1955  MRN: [id number]   Admit date: 3/26/2024   Admitting Physician: Rene Maloney MD  Primary Care Physician: Del Shetty MD    Chief Complaint: Getting up to bedside toilet      Interval History: Melisa at bedside with patient to get him up to bedside toilet.  He indicates he is able to stand and pivot with his right foot and not bear weight on his left foot.    Allergies:   Allergies   Allergen Reactions    Cefepime Hives and Anaphylaxis    Bactrim [Sulfamethoxazole-Trimethoprim] Other (See Comments)     \"RENAL FAILURE\"    Vancomycin Itching    Zolpidem Unknown - High Severity     \"makes him crazy\"    Zolpidem Tartrate Unknown - Low Severity and Provider Review Needed    Metronidazole Rash       Current Scheduled Medications: " "  apixaban, 5 mg, Oral, Q12H  ascorbic acid, 1,000 mg, Oral, Daily  aspirin, 81 mg, Oral, Daily  calcitriol, 0.5 mcg, Oral, Daily  carvedilol, 3.125 mg, Oral, BID With Meals  donepezil, 10 mg, Oral, Daily  famotidine, 20 mg, Oral, Daily  insulin detemir, 30 Units, Subcutaneous, Nightly  insulin lispro, 4-24 Units, Subcutaneous, 4x Daily AC & at Bedtime  insulin regular, 10 Units, Subcutaneous, TID AC  isosorbide mononitrate, 30 mg, Oral, Q24H  Linezolid, 600 mg, Intravenous, Q12H  melatonin, 6 mg, Oral, Nightly  midodrine, 2.5 mg, Oral, TID AC  mupirocin, 1 Application, Each Nare, BID  pregabalin, 100 mg, Oral, Nightly  pregabalin, 50 mg, Oral, Daily  rosuvastatin, 10 mg, Oral, Nightly  senna-docusate sodium, 1 tablet, Oral, BID  sodium chloride, 10 mL, Intravenous, Q12H  sucralfate, 1 g, Oral, TID AC  tamsulosin, 0.4 mg, Oral, Daily  timolol, 1 drop, Both Eyes, Q12H  vitamin D3, 5,000 Units, Oral, Daily  zinc sulfate, 220 mg, Oral, Daily      Current PRN Medications:    acetaminophen **OR** acetaminophen **OR** acetaminophen    senna-docusate sodium **AND** polyethylene glycol **AND** bisacodyl **AND** bisacodyl    dextrose    dextrose    Diclofenac Sodium    glucagon (human recombinant)    haloperidol lactate    LORazepam    magnesium hydroxide    nitroglycerin    ondansetron ODT **OR** ondansetron    oxyCODONE    sodium chloride    sodium chloride    sodium chloride            Objective     Vital Signs:  Temp (24hrs), Av.7 °F (36.5 °C), Min:97.6 °F (36.4 °C), Max:97.8 °F (36.6 °C)      /57 (BP Location: Right arm, Patient Position: Lying)   Pulse 64   Temp 97.6 °F (36.4 °C) (Axillary)   Resp 16   Ht 182.9 cm (72\")   Wt 134 kg (295 lb 6.7 oz)   SpO2 99%   BMI 40.07 kg/m²         Physical Exam:  General: Patient was alert sitting up at bedside.  He was in no acute distress  Left foot dressing clean dry and intact.  Neuro: Speech clear, alert, able to assist getting to bedside " toilet                        Results Review:    I reviewed the patient's new clinical results.    Lab Results:    CBC:   Lab Results   Lab 03/26/24  1326 03/27/24  0449 03/28/24  1004 03/29/24  0551 03/30/24  0620 03/31/24  0526   WBC 14.69* 12.75* 9.45 6.08 5.69 5.01   HEMOGLOBIN 11.3* 11.8* 13.7 11.1* 10.0* 10.3*   HEMATOCRIT 33.8* 35.9* 41.1 33.6* 30.6* 31.6*   PLATELETS 171 163 154 156 155 149        AutoDiff:   Lab Results   Lab 03/29/24  0551 03/30/24  0620 03/31/24  0526   NEUTROPHIL % 70.8 60.4 55.3   LYMPHOCYTE % 15.5* 23.0 28.9   MONOCYTES % 11.0 11.8 10.8   EOSINOPHIL % 1.5 4.0 4.2   BASOPHIL % 0.5 0.4 0.4   NEUTROS ABS 4.31 3.44 2.77   LYMPHS ABS 0.94 1.31 1.45   MONOS ABS 0.67 0.67 0.54   EOS ABS 0.09 0.23 0.21   BASOS ABS 0.03 0.02 0.02        Manual Diff:    Lab Results   Lab 03/29/24  0551 03/30/24  0620 03/31/24  0526   NEUTROS ABS 4.31 3.44 2.77           CMP:   Lab Results   Lab 03/29/24  0551 03/30/24  0620 03/31/24  0526   SODIUM 138 135* 136   POTASSIUM 3.3* 3.5 3.6   CHLORIDE 103 100 101   CO2 23.0 24.0 23.0   BUN 40* 51* 51*   CREATININE 2.07* 2.74* 2.52*   CALCIUM 8.4* 8.2* 8.6   BILIRUBIN 0.7 0.5 0.5   ALK PHOS 81 95 110   ALT (SGPT) 17 19 19   AST (SGOT) 32 32 30   GLUCOSE 120* 104* 201*       Estimated Creatinine Clearance: 40.2 mL/min (A) (by C-G formula based on SCr of 2.52 mg/dL (H)).    Culture Results:    Microbiology Results (last 10 days)       Procedure Component Value - Date/Time    Blood Culture With DARSHAN - Blood, Arm, Left [[id number]]  (Normal) Collected: 03/30/24 0620    Lab Status: Preliminary result Specimen: Blood from Arm, Left Updated: 03/31/24 0645     Blood Culture No growth at 24 hours    Blood Culture With DARSHAN - Blood, Hand, Right [[id number]]  (Abnormal) Collected: 03/29/24 0843    Lab Status: Preliminary result Specimen: Blood from Hand, Right Updated: 03/30/24 1904     Blood Culture Abnormal Stain     Gram Stain Aerobic Bottle Gram positive cocci    Blood Culture  ID, PCR - Blood, Hand, Right [[id number]]  (Abnormal) Collected: 03/29/24 0843    Lab Status: Final result Specimen: Blood from Hand, Right Updated: 03/30/24 2028     BCID, PCR Staph aureus. mecA/C and MREJ (methicillin resistance gene) detected. Identification by BCID2 PCR.     BOTTLE TYPE Aerobic Bottle    Narrative:      Infectious disease consultation is highly recommended to rule out distant foci of infection.    MRSA Screen, PCR (Inpatient) - Swab, Nares [[id number]]  (Abnormal) Collected: 03/28/24 2128    Lab Status: Final result Specimen: Swab from Nares Updated: 03/28/24 2239     MRSA PCR MRSA Detected    Narrative:      The negative predictive value of this diagnostic test is high and should only be used to consider de-escalating anti-MRSA therapy. A positive result may indicate colonization with MRSA and must be correlated clinically.    AMAN AURIS SCREEN - Swab, Axilla Right, Axilla Left and Groin [[id number]]  (Normal) Collected: 03/27/24 0351    Lab Status: Preliminary result Specimen: Swab from Axilla Right, Axilla Left and Groin Updated: 03/31/24 0415     Candida Auris Screen Culture No Candida auris isolated at 4 days    Respiratory Panel PCR w/COVID-19(SARS-CoV-2) MICHAEL/ANKUSH/SEAN/PAD/COR/ROSE MARY In-House, NP Swab in UTM/VTM, 2 HR TAT - Swab, Nasopharynx [[id number]]  (Normal) Collected: 03/26/24 1732    Lab Status: Final result Specimen: Swab from Nasopharynx Updated: 03/26/24 1905     ADENOVIRUS, PCR Not Detected     Coronavirus 229E Not Detected     Coronavirus HKU1 Not Detected     Coronavirus NL63 Not Detected     Coronavirus OC43 Not Detected     COVID19 Not Detected     Human Metapneumovirus Not Detected     Human Rhinovirus/Enterovirus Not Detected     Influenza A PCR Not Detected     Influenza B PCR Not Detected     Parainfluenza Virus 1 Not Detected     Parainfluenza Virus 2 Not Detected     Parainfluenza Virus 3 Not Detected     Parainfluenza Virus 4 Not Detected     RSV, PCR Not Detected      Bordetella pertussis pcr Not Detected     Bordetella parapertussis PCR Not Detected     Chlamydophila pneumoniae PCR Not Detected     Mycoplasma pneumo by PCR Not Detected    Narrative:      In the setting of a positive respiratory panel with a viral infection PLUS a negative procalcitonin without other underlying concern for bacterial infection, consider observing off antibiotics or discontinuation of antibiotics and continue supportive care. If the respiratory panel is positive for atypical bacterial infection (Bordetella pertussis, Chlamydophila pneumoniae, or Mycoplasma pneumoniae), consider antibiotic de-escalation to target atypical bacterial infection.    Blood Culture - Blood, Arm, Right [[id number]]  (Abnormal) Collected: 03/26/24 1346    Lab Status: Final result Specimen: Blood from Arm, Right Updated: 03/29/24 0513     Blood Culture Staphylococcus aureus, MRSA     Comment:   Infectious disease consultation is highly recommended to rule out distant foci of infection.  Methicillin resistant Staphylococcus aureus, Patient may be an isolation risk.        Isolated from Aerobic and Anaerobic Bottles     Gram Stain Aerobic Bottle Gram positive cocci      Anaerobic Bottle Gram positive cocci    Blood Culture - Blood, Arm, Left [[id number]]  (Abnormal)  (Susceptibility) Collected: 03/26/24 1346    Lab Status: Final result Specimen: Blood from Arm, Left Updated: 03/29/24 0513     Blood Culture Staphylococcus aureus, MRSA     Comment:   Infectious disease consultation is highly recommended to rule out distant foci of infection.  Methicillin resistant Staphylococcus aureus, Patient may be an isolation risk.        Isolated from Aerobic and Anaerobic Bottles     Gram Stain Aerobic Bottle Gram positive cocci      Anaerobic Bottle Gram positive cocci    Susceptibility        Staphylococcus aureus, MRSA      MATT      Gentamicin Susceptible      Oxacillin Resistant      Rifampin Susceptible      Vancomycin Susceptible                        Susceptibility Comments       Staphylococcus aureus, MRSA    This isolate is presumed to be clindamycin resistant based on detection of inducible clindamycin resistance.  Clindamycin may still be effective in some patients.               Blood Culture ID, PCR - Blood, Arm, Left [[id number]]  (Abnormal) Collected: 03/26/24 1346    Lab Status: Final result Specimen: Blood from Arm, Left Updated: 03/27/24 0426     BCID, PCR Staph aureus. mecA/C and MREJ (methicillin resistance gene) detected. Identification by BCID2 PCR.     BOTTLE TYPE Aerobic Bottle    Narrative:      Infectious disease consultation is highly recommended to rule out distant foci of infection.                 Radiology:   Imaging Results (Last 72 Hours)       Procedure Component Value Units Date/Time    MRI Foot Left With & Without Contrast [[id number]] Collected: 03/29/24 1856     Updated: 03/29/24 1901    Narrative:      EXAMINATION: MRI FOOT LEFT W WO CONTRAST-     3/29/2024 4:30 PM     HISTORY: Foot swelling, diabetic, osteomyelitis suspected, xray done;  E11.628-Type 2 diabetes mellitus with other skin complications;  L08.9-Local infection of the skin and subcutaneous tissue, unspecified;  E11.65-Type 2 diabetes mellitus with hyperglycemia; Z74.09-Other reduced  mobility     LEFT foot MRI without and with IV gadolinium contrast.  Axial, sagittal, and coronal sequences.     COMPARISON:  LEFT foot x-rays from 3/26/2024.     There is a tiny metal foreign body within the plantar soft tissues  adjacent to the second toe and despite the small size it produces a  prominent amount of artifact related to image distortion.     I see no soft tissue abscess.     No discrete bone edema or bone enhancement is seen to indicate  osteomyelitis.       Impression:      1. There are some limitations to the study based on metal related  artifact.  2. No soft tissue abscess or definite bone marrow edema or enhancement  is seen to indicate  osteomyelitis.           This report was signed and finalized on 3/29/2024 6:58 PM by Dr. Gomez Mcgill MD.                   Active Hospital Problems    Diagnosis     **Sepsis     Diabetic foot infection     Chronic kidney disease (CKD), stage IV (severe)     Chronic diastolic heart failure     BPH without obstruction/lower urinary tract symptoms     Diabetic polyneuropathy associated with type 2 diabetes mellitus     Anemia due to chronic kidney disease     Essential hypertension     Type 2 diabetes mellitus with hyperglycemia, with long-term current use of insulin        IMPRESSION:  MRSA bacteremia-blood cultures + March 26 and March 29.  Secondary to infected left foot with wounds and associated cellulitis on admission.  Chronic kidney disease stage IIIb-BUN and creatinine both increased yesterday.  Bumex held.  Creatinine trending down today  Type 2 diabetes mellitus-poorly controlled with hemoglobin A1c of 10.7 this admission-  MRSA nasal screen positive      RECOMMENDATION:   Continue linezolid-extended duration as patient had positive blood culture from March 29  Order surveillance culture for morning of April 1  Wound care per Dr. Cerrato's orders  Continue to monitor surveillance blood cultures from  March 30.  Given persistent bacteremia, will order 2D echo for Monday  Glucose management per attending          Julia Adrian MD  03/31/24  09:43 CDT        Electronically signed by Julia Adrian MD at 03/31/24 1240       Rene Maloney MD at 03/30/24 1639              [facility] Medicine Services  INPATIENT PROGRESS NOTE    Patient Name: Erick Luong  Date of Admission: 3/26/2024  Today's Date: 03/30/24  Length of Stay: 4  Primary Care Physician: Del Shetty MD    Subjective   Chief Complaint: Altered mental status    HPI   Patient is alert and oriented x 3.  He appears to be in good spirits and ate all his breakfast.  MRI of the left foot did not  show any osteomyelitis or abscess.    Review of Systems   All pertinent negatives and positives are as above. All other systems have been reviewed and are negative unless otherwise stated.     Objective    Temp:  [97.3 °F (36.3 °C)-98.7 °F (37.1 °C)] 97.8 °F (36.6 °C)  Heart Rate:  [74-89] 75  Resp:  [16-18] 16  BP: ()/(51-67) 118/67  Physical Exam  Constitutional:       General: He is not in acute distress.     Appearance: He is well-developed. He is not diaphoretic.   HENT:      Head: Normocephalic.   Eyes:      General: No scleral icterus.     Conjunctiva/sclera: Conjunctivae normal.      Pupils: Pupils are equal, round, and reactive to light.   Neck:      Thyroid: No thyromegaly.      Vascular: No JVD.      Trachea: No tracheal deviation.   Cardiovascular:      Rate and Rhythm: Normal rate and regular rhythm.      Heart sounds: Normal heart sounds. No murmur heard.     No friction rub. No gallop.   Pulmonary:      Effort: Pulmonary effort is normal. No respiratory distress.      Breath sounds: Normal breath sounds. No stridor. No wheezing or rales.   Chest:      Chest wall: No tenderness.   Abdominal:      General: Bowel sounds are normal. There is no distension.      Palpations: Abdomen is soft. There is no mass.      Tenderness: There is no abdominal tenderness. There is no guarding or rebound.      Hernia: No hernia is present.   Musculoskeletal:         General: Swelling and signs of injury present. No tenderness or deformity. Normal range of motion.      Cervical back: Normal range of motion and neck supple.      Right lower leg: No edema.      Comments: Dressing over left foot noted.   Lymphadenopathy:      Cervical: No cervical adenopathy.   Skin:     General: Skin is warm and dry.      Coloration: Skin is not pale.      Findings: No erythema or rash.   Neurological:      General: No focal deficit present.      Mental Status: He is alert and oriented to person, place, and time.      Cranial Nerves:  No cranial nerve deficit.      Sensory: No sensory deficit.      Motor: No abnormal muscle tone.      Coordination: Coordination normal.   Psychiatric:         Mood and Affect: Mood normal.         Behavior: Behavior normal.            File Link    Scan on 3/26/2024 1423 by Ro Vinson RN: Wound 06/15/23 0102 Left anterior plantar        Key Information    Document ID File Type Document Type Description   [id number] Image WOUND IMAGE Wound 06/15/23 0102 Left anterior plantar     Import Information    Attached At Date Time User Dept   Encounter Level 3/26/2024  2:23 PM Ro Vinson RN [facility] Emergency Dept     Encounter    Hospital Encounter on 3/26/24         Results Review:  I have reviewed the labs, radiology results, and diagnostic studies.    Laboratory Data:   Results from last 7 days   Lab Units 03/30/24  0620 03/29/24  0551 03/28/24  1004   WBC 10*3/mm3 5.69 6.08 9.45   HEMOGLOBIN g/dL 10.0* 11.1* 13.7   HEMATOCRIT % 30.6* 33.6* 41.1   PLATELETS 10*3/mm3 155 156 154        Results from last 7 days   Lab Units 03/30/24  0620 03/29/24  0551 03/28/24  1004 03/27/24  0449 03/26/24  1326   SODIUM mmol/L 135* 138 140   < > 140   POTASSIUM mmol/L 3.5 3.3* 3.6   < > 3.4*   CHLORIDE mmol/L 100 103 102   < > 103   CO2 mmol/L 24.0 23.0 22.0   < > 23.0   BUN mg/dL 51* 40* 36*   < > 29*   CREATININE mg/dL 2.74* 2.07* 2.03*   < > 1.93*   CALCIUM mg/dL 8.2* 8.4* 9.2   < > 9.5   BILIRUBIN mg/dL 0.5 0.7  --   --  0.7   ALK PHOS U/L 95 81  --   --  91   ALT (SGPT) U/L 19 17  --   --  8   AST (SGOT) U/L 32 32  --   --  13   GLUCOSE mg/dL 104* 120* 79   < > 438*    < > = values in this interval not displayed.       Culture Data:   Blood Culture   Date Value Ref Range Status   03/26/2024 Abnormal Stain (C)  Preliminary   03/26/2024 Abnormal Stain (C)  Preliminary       Radiology Data:   Imaging Results (Last 24 Hours)       Procedure Component Value Units Date/Time    MRI Foot Left With & Without Contrast  [[id number]] Collected: 03/29/24 1856     Updated: 03/29/24 1901    Narrative:      EXAMINATION: MRI FOOT LEFT W WO CONTRAST-     3/29/2024 4:30 PM     HISTORY: Foot swelling, diabetic, osteomyelitis suspected, xray done;  E11.628-Type 2 diabetes mellitus with other skin complications;  L08.9-Local infection of the skin and subcutaneous tissue, unspecified;  E11.65-Type 2 diabetes mellitus with hyperglycemia; Z74.09-Other reduced  mobility     LEFT foot MRI without and with IV gadolinium contrast.  Axial, sagittal, and coronal sequences.     COMPARISON:  LEFT foot x-rays from 3/26/2024.     There is a tiny metal foreign body within the plantar soft tissues  adjacent to the second toe and despite the small size it produces a  prominent amount of artifact related to image distortion.     I see no soft tissue abscess.     No discrete bone edema or bone enhancement is seen to indicate  osteomyelitis.       Impression:      1. There are some limitations to the study based on metal related  artifact.  2. No soft tissue abscess or definite bone marrow edema or enhancement  is seen to indicate osteomyelitis.           This report was signed and finalized on 3/29/2024 6:58 PM by Dr. Gomez Mcgill MD.               I have reviewed the patient's current medications.     Assessment/Plan   Assessment  Active Hospital Problems    Diagnosis     **Sepsis     Diabetic foot infection     Chronic kidney disease (CKD), stage IV (severe)     Chronic diastolic heart failure     BPH without obstruction/lower urinary tract symptoms     Diabetic polyneuropathy associated with type 2 diabetes mellitus     Anemia due to chronic kidney disease     Essential hypertension     Type 2 diabetes mellitus with hyperglycemia, with long-term current use of insulin    MRSA bacteremia  Metabolic encephalopathy secondary to sepsis  WANDER on CKD    Treatment Plan  Continue IV antibiotics with Zyvox. Infectious disease input appreciated.  Mental status  appears back to baseline now that sepsis is better controlled.    Podiatry consultation input appreciated.  Will follow recommendations.  Continue wound dressings as per podiatry Follow-up repeat blood cultures.    Nephrology and urology input appreciated.  Hold Bumex due to elevated creatinine and BUN.  No signs of urinary retention at this time.    Pain control with opiate pain medications.    Insulin sliding scale and Levemir for type 2 diabetes mellitus    DVT prophylaxis with Eliquis    PT/OT/wound care consultations    DVT prophylaxis with Eliquis    CODE STATUS is full code    Medical Decision Making  Number and Complexity of problems: 4 acute, severe, high complexity medical problems.  Multiple chronic medical problems.  Differential Diagnosis: None    Conditions and Status        Condition is worsening.     MDM Data  External documents reviewed: None  Cardiac tracing (EKG, telemetry) interpretation: None  Radiology interpretation: None  Labs reviewed: CBC, BMP, blood cultures reviewed by me  Any tests that were considered but not ordered: None     Decision rules/scores evaluated (example [id number], Wells, etc): N/A     Discussed with: Patient     Care Planning  Shared decision making: Patient and his wife  Code status and discussions: CODE STATUS is full code    Disposition  Social Determinants of Health that impact treatment or disposition: None  I expect the patient to be discharged to skilled nursing facility in 3 to 5 days.     Electronically signed by Rene Maloney MD, 03/30/24, 16:39 CDT.     Electronically signed by Rene Maloney MD at 03/30/24 1644       Brian Lomas MD at 03/30/24 1611          Nephrology ([facility]) Progress Note      Patient:  Erick Luong  YOB: 1955  Date of Service: 3/30/2024  MRN: [id number]   Acct: [id number]   Primary Care Physician: Del Shetty MD  Advance Directive:   Code Status and Medical Interventions:    Ordered at: 03/26/24 1815     Level Of Support Discussed With:    Health Care Surrogate     Code Status (Patient has no pulse and is not breathing):    CPR (Attempt to Resuscitate)     Medical Interventions (Patient has pulse or is breathing):    Full Support     Admit Date: 3/26/2024       Hospital Day: 4  Referring Provider: No ref. provider found      Patient personally seen and examined.  Complete chart including Consults, Notes, Operative Reports, Labs, Cardiology, and Radiology studies reviewed as able.    Chief complaint: Abnormal labs.    Subjective:  Erick Luong is a 68 y.o. male for whom we were consulted for evaluation and treatment of acute kidney injury.  He has stage IIIb chronic kidney disease baseline, follows with Dr Lomas in our office.  Baseline creatinine approximately 1.8. He has history of insulin-dependent type 2 diabetes, hypertension, abdominal obesity, obstructive sleep apnea, diabetic foot ulcer and coronary artery disease.   Patient presented to ER on 3/26 with altered mental status. Had debridement of ongoing wound on left foot the day prior. Left foot warm and erythremic on arrival to ER. Noted to have elevated blood glucose over 400. Initial creatinine of 1.9.  Admitted to medical floor for sepsis due to left foot wound. Patient has been agitated and confused during the admission, requiring wrist restraints due to pulling at lines and tubes.     This morning's patient is more alert and awake.  He is no longer restrained or disoriented.  He has good appetite.  His renal function remained stable.    Allergies:  Cefepime, Bactrim [sulfamethoxazole-trimethoprim], Vancomycin, Zolpidem, Zolpidem tartrate, and Metronidazole    Home Meds:  Medications Prior to Admission   Medication Sig Dispense Refill Last Dose    amitriptyline (ELAVIL) 25 MG tablet Take 2 tablets by mouth Daily at bedtime. (Patient not taking: Reported on 3/27/2024) 60 tablet 1 Not Taking    apixaban (ELIQUIS) 5 MG tablet  tablet Take 1 tablet by mouth Every 12 (Twelve) Hours.       ascorbic acid (VITAMIN C) 1000 MG tablet Take 1 tablet by mouth Daily. 30 tablet 3     Aspirin 81 MG capsule Take 81 mg by mouth Daily.       bumetanide (BUMEX) 1 MG tablet Take 1 tablet every day by oral route for 30 days. 30 tablet 0     bumetanide (BUMEX) 2 MG tablet Take 1 tablet by mouth Daily. Take 2 tablets in morning;1 tablet at night (Patient not taking: Reported on 3/27/2024)   Not Taking    busPIRone (BUSPAR) 10 MG tablet Take 1 tablet by mouth 3 (Three) Times a Day.       calcitriol (ROCALTROL) 0.5 MCG capsule Take 1 capsule by mouth Daily. 90 capsule 4     calcitriol (ROCALTROL) 0.5 MCG capsule Take 1 capsule every day by oral route for 90 days. (Patient not taking: Reported on 3/27/2024) 90 capsule 4 Not Taking    carvedilol (COREG) 3.125 MG tablet Take 1 tablet twice a day by oral route. 60 tablet 4     carvedilol (COREG) 6.25 MG tablet Take 1 tablet by mouth 2 (Two) Times a Day. (Patient not taking: Reported on 3/27/2024) 180 tablet 4 Not Taking    Cholecalciferol (D-5000) 125 MCG (5000 UT) tablet Take 1 tablet by mouth Daily Before Lunch. 30 tablet 2     cloNIDine (CATAPRES) 0.1 MG tablet Take 1 tablet by mouth Every 12 (Twelve) Hours. (Patient not taking: Reported on 3/27/2024) 60 tablet 2 Not Taking    Diclofenac Sodium (VOLTAREN) 1 % gel gel Apply 2 g topically to the appropriate area as directed 4 (Four) Times a Day As Needed. 300 g 11     Diclofenac Sodium (VOLTAREN) 1 % gel gel Apply 2 g topically to the appropriate area as directed 4 (Four) Times a Day. (Patient not taking: Reported on 3/27/2024) 300 g 11 Not Taking    donepezil (ARICEPT) 10 MG tablet Take 1 tablet by mouth Daily. (Patient not taking: Reported on 3/27/2024) 90 tablet 4 Not Taking    Dulaglutide (Trulicity) 4.5 MG/0.5ML solution pen-injector Inject 0.5 mL under the skin into the appropriate area as directed 1 (One) Time Per Week. (Patient not taking: Reported on  3/27/2024) 2 mL 11 Not Taking    DULoxetine (CYMBALTA) 60 MG capsule Take 1 capsule by mouth Daily. 90 capsule 4     DULoxetine (CYMBALTA) 60 MG capsule Take 1 capsule by mouth Daily. (Patient not taking: Reported on 3/27/2024) 90 capsule 4 Not Taking    DULoxetine (CYMBALTA) 60 MG capsule Take 1 capsule by mouth Daily. (Patient not taking: Reported on 3/27/2024) 90 capsule 4 Not Taking    empagliflozin (JARDIANCE) 25 MG tablet tablet Take 1 tablet by mouth Daily. (Patient not taking: Reported on 3/27/2024) 30 tablet 2 Not Taking    famotidine (PEPCID) 20 MG tablet Take 1 tablet twice a day by oral route. 180 tablet 4     fluticasone (FLONASE) 50 MCG/ACT nasal spray 1 spray into the nostril(s) as directed by provider Daily. (Patient taking differently: 1 spray into the nostril(s) as directed by provider 2 (Two) Times a Day.) 16 g 1     HYDROcodone-acetaminophen (NORCO) 7.5-325 MG per tablet Take 1 tablet by mouth 2 (Two) Times a Day As Needed. (Patient not taking: Reported on 3/27/2024) 40 tablet 0 Not Taking    Insulin Glargine (Lantus SoloStar) 100 UNIT/ML injection pen Inject 20 Units under the skin into the appropriate area as directed Every Evening. 15 mL 3     Insulin Regular Human, Conc, (HumuLIN R U-500 KwikPen) 500 UNIT/ML solution pen-injector CONCENTRATED injection Inject 120 Units under the skin into the appropriate area as directed 2 (Two) Times a Day with breakfast and dinner. (Patient not taking: Reported on 3/27/2024) 18 mL 5 Not Taking    Insulin Regular Human, Conc, (HumuLIN R U-500 KwikPen) 500 UNIT/ML solution pen-injector CONCENTRATED injection Inject 40 Units under the skin into the appropriate area with regular meals AND 40 Units with large meals. 54 mL 3     isosorbide mononitrate (IMDUR) 30 MG 24 hr tablet Take 1 tablet by mouth Daily.       magnesium hydroxide (Milk of Magnesia) 400 MG/5ML suspension Take 30 mL every day by oral route for 30 days. 900 mL 0     magnesium hydroxide (Milk of  Magnesia) 400 MG/5ML suspension Take 30mL by mouth once daily for 30 days. (Patient not taking: Reported on 3/27/2024) 900 mL 0 Not Taking    melatonin 3 MG tablet Take 1 tablet by mouth At Night As Needed for Sleep.       methylcellulose (Citrucel) oral powder Mix 5g as directed and drink twice daily for 90 days. 900 g 10     metoclopramide (REGLAN) 5 MG tablet Take 1 tablet by mouth 3 times a day.       midodrine (PROAMATINE) 2.5 MG tablet Take 1 tablet by mouth 3 (Three) Times a Day Before Meals.       nitroglycerin (NITROSTAT) 0.4 MG SL tablet Dissolve 1 tablet by mouth every 5 minutes as needed for chest pain; MAX 3 tabs/24 hours.  If no improvement after 3 tabs go to ER 25 tablet 0     ondansetron (ZOFRAN) 4 MG tablet Take 1 tablet by mouth Every 6 (Six) Hours As Needed for Nausea or Vomiting.       oxyCODONE (ROXICODONE) 10 MG tablet Take 1 tablet by mouth twice a day as needed 60 tablet 0     pantoprazole (Protonix) 40 MG EC tablet Take 1 tablet by mouth 2 (Two) Times a Day. (Patient not taking: Reported on 3/27/2024) 180 tablet 4 Not Taking    polyethylene glycol (MIRALAX) 17 g packet Take 17 g by mouth Daily. Obtain OTC       prazosin (MINIPRESS) 1 MG capsule Take 1 capsule by mouth every night at bedtime. 30 capsule 2     pregabalin (LYRICA) 100 MG capsule Take 2 capsules by mouth Every Night.       pregabalin (LYRICA) 50 MG capsule Take 1 capsule by mouth Daily.       rosuvastatin (CRESTOR) 10 MG tablet Take 1 tablet by mouth Daily.       sennosides-docusate (PERICOLACE) 8.6-50 MG per tablet Take 1 tablet by mouth Every Night. Obtain OTC       sennosides-docusate (PERICOLACE) 8.6-50 MG per tablet Take 1 tablet by mouth every night at bedtime. (Patient not taking: Reported on 3/27/2024) 30 tablet 2 Not Taking    sodium hypochlorite (DAKIN'S 1/4 STRENGTH) 0.125 % solution topical solution 0.125% Apply to affected area twice daily (Patient not taking: Reported on 3/27/2024) 473 mL 3 Not Taking    sodium  hypochlorite (DAKIN'S 1/4 STRENGTH) 0.125 % solution topical solution 0.125% Apply to affected area twice daily as directed (Patient not taking: Reported on 3/27/2024) 473 mL 5 Not Taking    sodium hypochlorite (DAKIN'S) 0.25 % topical solution Use to wound daily as instructed 473 mL 1     sucralfate (CARAFATE) 1 g tablet Take 1 tablet by mouth 3 times a day.       tamsulosin (FLOMAX) 0.4 MG capsule 24 hr capsule Take 1 capsule by mouth Daily. 90 capsule 1     timolol (TIMOPTIC) 0.5 % ophthalmic solution Administer 1 drop to both eyes 2 (Two) Times a Day.       valsartan (DIOVAN) 40 MG tablet Take 1 tablet by mouth Daily.       zinc sulfate (ZINCATE) 50 MG capsule Take 1 capsule by mouth Daily.          Medicines:  Current Facility-Administered Medications   Medication Dose Route Frequency Provider Last Rate Last Admin    acetaminophen (TYLENOL) tablet 650 mg  650 mg Oral Q4H PRN Raquel Duncan APRN   650 mg at 03/27/24 1005    Or    acetaminophen (TYLENOL) 160 MG/5ML oral solution 650 mg  650 mg Oral Q4H PRN Raquel Duncan APRN   650 mg at 03/27/24 2109    Or    acetaminophen (TYLENOL) suppository 650 mg  650 mg Rectal Q4H PRN Raquel Duncan APRN        apixaban (ELIQUIS) tablet 5 mg  5 mg Oral Q12H Raquel Duncan APRN   5 mg at 03/30/24 0836    ascorbic acid (VITAMIN C) tablet 1,000 mg  1,000 mg Oral Daily Rene Maloney MD   1,000 mg at 03/30/24 0836    aspirin EC tablet 81 mg  81 mg Oral Daily Rene Maloney MD   81 mg at 03/30/24 0836    sennosides-docusate (PERICOLACE) 8.6-50 MG per tablet 1 tablet  1 tablet Oral BID Raquel Duncan APRN   1 tablet at 03/28/24 2126    And    polyethylene glycol (MIRALAX) packet 17 g  17 g Oral Daily PRN Raquel Duncan APRN        And    bisacodyl (DULCOLAX) EC tablet 5 mg  5 mg Oral Daily PRN Raquel Duncan APRN        And    bisacodyl (DULCOLAX) suppository 10 mg  10 mg Rectal Daily PRN Raquel Duncan APRN        bumetanide (BUMEX)  tablet 1 mg  1 mg Oral Daily Raquel Duncan APRN   1 mg at 03/30/24 0836    calcitriol (ROCALTROL) capsule 0.5 mcg  0.5 mcg Oral Daily Rene Maloney MD   0.5 mcg at 03/30/24 0836    carvedilol (COREG) tablet 3.125 mg  3.125 mg Oral BID With Meals Rene Maloney MD   3.125 mg at 03/28/24 1836    dextrose (D50W) (25 g/50 mL) IV injection 25 g  25 g Intravenous Q15 Min PRN Raquel Duncan, SUSAN        dextrose (GLUTOSE) oral gel 15 g  15 g Oral Q15 Min PRN Raquel Duncan APRN   15 g at 03/27/24 1710    Diclofenac Sodium (VOLTAREN) 1 % gel 2 g  2 g Topical 4x Daily PRN Rene Maloney MD        donepezil (ARICEPT) tablet 10 mg  10 mg Oral Daily Raquel Duncan APRN   10 mg at 03/30/24 0836    [START ON 3/31/2024] famotidine (PEPCID) tablet 20 mg  20 mg Oral Daily Rene Maloney MD        glucagon (GLUCAGEN) injection 1 mg  1 mg Intramuscular Q15 Min PRN Raquel Duncan APRN        haloperidol lactate (HALDOL) injection 5 mg  5 mg Intravenous Q6H PRN Rene Maloney MD        insulin detemir (LEVEMIR) injection 30 Units  30 Units Subcutaneous Nightly Raquel Duncan APRN   30 Units at 03/29/24 2056    Insulin Lispro (humaLOG) injection 4-24 Units  4-24 Units Subcutaneous 4x Daily AC & at Bedtime Raquel Duncan APRN   4 Units at 03/28/24 1838    insulin regular (humuLIN R,novoLIN R) injection 10 Units  10 Units Subcutaneous TID AC Steve De Jesus MD   10 Units at 03/30/24 1132    isosorbide mononitrate (IMDUR) 24 hr tablet 30 mg  30 mg Oral Q24H Rene Maloney MD   30 mg at 03/30/24 0836    Linezolid (ZYVOX) 600 mg 300 mL  600 mg Intravenous Q12H Raquel Duncan APRN 300 mL/hr at 03/30/24 1132 600 mg at 03/30/24 1132    LORazepam (ATIVAN) injection 1 mg  1 mg Intravenous Q4H PRN Rene Maloney MD   1 mg at 03/29/24 0140    magnesium hydroxide (MILK OF MAGNESIA) suspension 10 mL  10 mL Oral Daily PRN Rene Maloney MD        melatonin tablet 6 mg  6 mg  Oral Nightly Rene Maloney MD   6 mg at 03/29/24 2036    midodrine (PROAMATINE) tablet 2.5 mg  2.5 mg Oral TID AC Rene Maloney MD   2.5 mg at 03/30/24 1132    mupirocin (BACTROBAN) 2 % nasal ointment 1 Application  1 Application Each Nare BID WoNadia higginbotham K, APRN   1 Application at 03/30/24 0836    nitroglycerin (NITROSTAT) SL tablet 0.4 mg  0.4 mg Sublingual Q5 Min PRN Raquel Dunacn APRN        ondansetron ODT (ZOFRAN-ODT) disintegrating tablet 4 mg  4 mg Oral Q6H PRN Raquel Duncan APRN   4 mg at 03/30/24 1132    Or    ondansetron (ZOFRAN) injection 4 mg  4 mg Intravenous Q6H PRN Raquel Duncan APRN   4 mg at 03/29/24 1837    oxyCODONE (ROXICODONE) immediate release tablet 10 mg  10 mg Oral BID PRN Raquel Duncan APRN   10 mg at 03/30/24 0013    pregabalin (LYRICA) capsule 100 mg  100 mg Oral Nightly Rene Maloney MD   100 mg at 03/29/24 2036    pregabalin (LYRICA) capsule 50 mg  50 mg Oral Daily eRne Maloney MD   50 mg at 03/30/24 0841    rosuvastatin (CRESTOR) tablet 10 mg  10 mg Oral Nightly Rene Maloney MD   10 mg at 03/29/24 2036    sodium chloride 0.9 % flush 10 mL  10 mL Intravenous PRN Steve De Jesus MD        sodium chloride 0.9 % flush 10 mL  10 mL Intravenous Q12H Raquel Duncan APRN   10 mL at 03/30/24 0837    sodium chloride 0.9 % flush 10 mL  10 mL Intravenous PRN Raquel Duncan, SUSAN        sodium chloride 0.9 % infusion 40 mL  40 mL Intravenous PRN Raquel Duncan, SUSAN        sucralfate (CARAFATE) 1 GM/10ML suspension 1 g  1 g Oral TID AC Rene Maloney MD   1 g at 03/30/24 1132    tamsulosin (FLOMAX) 24 hr capsule 0.4 mg  0.4 mg Oral Daily Rene Maloney MD   0.4 mg at 03/30/24 0838    timolol (TIMOPTIC) 0.25 % ophthalmic solution 1 drop  1 drop Both Eyes Q12H Rene Maloney MD   1 drop at 03/30/24 0837    vitamin D3 capsule 5,000 Units  5,000 Units Oral Daily Rene Maloney MD   5,000 Units at 03/30/24  0836    zinc sulfate (ZINCATE) capsule 220 mg  220 mg Oral Daily Rene Maloney MD   220 mg at 03/30/24 0836       Past Medical History:  Past Medical History:   Diagnosis Date    Arthritis     Autonomic disease     CAD (coronary artery disease) 02/06/2017    Cervical radiculopathy 09/16/2021    Chronic constipation with acute exaccerbation 05/10/2021    Coronary artery disease     Degeneration of cervical intervertebral disc 08/11/2021    Diabetes mellitus     Diabetic foot ulcer 08/31/2020    Diabetic polyneuropathy associated with type 2 diabetes mellitus 01/18/2021    Elevated cholesterol     Gastroesophageal reflux disease 05/13/2019    Headache     HTN (hypertension), benign 05/03/2017    Hyperlipidemia     Hypertension     Mixed hyperlipidemia 02/07/2017    Multiple lung nodules 01/26/2020    5mm, 9 mm RLL identified 1/2020, not present 10/2019.    Myocardial infarction     Osteomyelitis 01/22/2020    Osteomyelitis of fifth toe of right foot 10/07/2019    Pancreatitis     Persistent insomnia 01/20/2020    Renal disorder     Sleep apnea 02/06/2017    Sleep apnea with use of continuous positive airway pressure (CPAP)     NON-COMPLIANT    Slow transit constipation 01/16/2019    Spinal stenosis in cervical region 09/16/2021    Vitamin D deficiency 03/02/2021       Past Surgical History:  Past Surgical History:   Procedure Laterality Date    ABDOMINAL SURGERY      AMPUTATION FOOT / TOE Left 10/2021    5th digit     ANTERIOR CERVICAL DISCECTOMY W/ FUSION N/A 8/5/2022    Procedure: CERVICAL DISCECTOMY ANTERIOR WITH FUSION C5-6 with possible plating of C5-7 with neuromonitoring and 1 c-arm;  Surgeon: Karel Soliz MD;  Location:  [facility] OR;  Service: Neurosurgery;  Laterality: N/A;    APPENDECTOMY      BACK SURGERY      CARDIAC CATHETERIZATION Left 02/08/2021    Procedure: Left Heart Cath w poss intervention left anatomical snuff box acess;  Surgeon: Omkar Charles DO;  Location:  [facility] CATH  INVASIVE LOCATION;  Service: Cardiology;  Laterality: Left;    CARDIAC SURGERY      CATARACT EXTRACTION      CERVICAL SPINE SURGERY      COLONOSCOPY N/A 2017    Normal exam repeat in 5 years    COLONOSCOPY N/A 2019    Mild acute inflammation    COLONOSCOPY W/ POLYPECTOMY  2014    Hyperplastic polyp    CORONARY ARTERY BYPASS GRAFT  10/2015    ENDOSCOPY  2011    Gastritis with hemorrhage    ENDOSCOPY N/A 2017    Normal exam    ENDOSCOPY N/A 2019    Gastritis    ENDOSCOPY N/A 2020    Non-erosive gastritis with hemorrhage    ENDOSCOPY N/A 02/10/2021    Esophagitis    FOOT SURGERY Left     INCISION AND DRAINAGE OF WOUND Left 2007    spider bite       Family History  Family History   Problem Relation Age of Onset    Colon cancer Father     Heart disease Father     Colon cancer Sister     Colon polyps Sister     Alzheimer's disease Mother     Coronary artery disease Sister     Coronary artery disease Sister        Social History  Social History     Socioeconomic History    Marital status:    Tobacco Use    Smoking status: Former     Current packs/day: 0.00     Types: Cigarettes     Quit date:      Years since quittin.2    Smokeless tobacco: Never    Tobacco comments:     smoked [place] [employer]   Vaping Use    Vaping status: Never Used   Substance and Sexual Activity    Alcohol use: No    Drug use: No    Sexual activity: Defer       Review of Systems:  History obtained from chart review and the patient  General ROS: No fever or chills  Respiratory ROS: No cough, shortness of breath, wheezing  Cardiovascular ROS: No chest pain or palpitations  Gastrointestinal ROS: No abdominal pain or melena  Genito-Urinary ROS: No dysuria or hematuria  Psych ROS: No anxiety and depression  14 point ROS reviewed with the patient and negative except as noted above and in the HPI unless unable to obtain.    Objective:  Patient Vitals for the past 24 hrs:   BP Temp Temp src Pulse Resp  SpO2 Weight   03/30/24 1425 118/67 97.7 °F (36.5 °C) Oral 75 16 98 % --   03/30/24 1108 123/58 97.7 °F (36.5 °C) Oral 76 18 96 % --   03/30/24 0803 102/53 97.3 °F (36.3 °C) Oral 74 18 96 % --   03/30/24 0300 97/51 97.6 °F (36.4 °C) Oral 78 16 95 % 134 kg (295 lb 6.7 oz)   03/29/24 2348 118/57 97.6 °F (36.4 °C) Oral 89 16 96 % --   03/29/24 2017 116/55 98.7 °F (37.1 °C) Oral 78 16 98 % --       Intake/Output Summary (Last 24 hours) at 3/30/2024 1611  Last data filed at 3/30/2024 1417  Gross per 24 hour   Intake 840 ml   Output 750 ml   Net 90 ml     General: awake/alert   HEENT: Normocephalic atraumatic head  Neck: Supple with no JVD or carotid bruits  Chest:  clear to auscultation bilaterally without respiratory distress  CVS: regular rate and rhythm  Abdominal: soft, nontender, positive bowel sounds  Extremities:  bilateral 1+ edema  Skin: warm and dry without rash      Labs:  Results from last 7 days   Lab Units 03/30/24  0620 03/29/24  0551 03/28/24  1004   WBC 10*3/mm3 5.69 6.08 9.45   HEMOGLOBIN g/dL 10.0* 11.1* 13.7   HEMATOCRIT % 30.6* 33.6* 41.1   PLATELETS 10*3/mm3 155 156 154         Results from last 7 days   Lab Units 03/30/24  0620 03/29/24  0551 03/28/24  1004 03/27/24  0449 03/26/24  1326   SODIUM mmol/L 135* 138 140   < > 140   POTASSIUM mmol/L 3.5 3.3* 3.6   < > 3.4*   CHLORIDE mmol/L 100 103 102   < > 103   CO2 mmol/L 24.0 23.0 22.0   < > 23.0   BUN mg/dL 51* 40* 36*   < > 29*   CREATININE mg/dL 2.74* 2.07* 2.03*   < > 1.93*   CALCIUM mg/dL 8.2* 8.4* 9.2   < > 9.5   EGFR mL/min/1.73 24.6* 34.5* 35.3*   < > 37.5*   BILIRUBIN mg/dL 0.5 0.7  --   --  0.7   ALK PHOS U/L 95 81  --   --  91   ALT (SGPT) U/L 19 17  --   --  8   AST (SGOT) U/L 32 32  --   --  13   GLUCOSE mg/dL 104* 120* 79   < > 438*    < > = values in this interval not displayed.       Radiology:   Imaging Results (Last 72 Hours)       Procedure Component Value Units Date/Time    MRI Foot Left With & Without Contrast [[id number]]  Collected: 03/29/24 1856     Updated: 03/29/24 1901    Narrative:      EXAMINATION: MRI FOOT LEFT W WO CONTRAST-     3/29/2024 4:30 PM     HISTORY: Foot swelling, diabetic, osteomyelitis suspected, xray done;  E11.628-Type 2 diabetes mellitus with other skin complications;  L08.9-Local infection of the skin and subcutaneous tissue, unspecified;  E11.65-Type 2 diabetes mellitus with hyperglycemia; Z74.09-Other reduced  mobility     LEFT foot MRI without and with IV gadolinium contrast.  Axial, sagittal, and coronal sequences.     COMPARISON:  LEFT foot x-rays from 3/26/2024.     There is a tiny metal foreign body within the plantar soft tissues  adjacent to the second toe and despite the small size it produces a  prominent amount of artifact related to image distortion.     I see no soft tissue abscess.     No discrete bone edema or bone enhancement is seen to indicate  osteomyelitis.       Impression:      1. There are some limitations to the study based on metal related  artifact.  2. No soft tissue abscess or definite bone marrow edema or enhancement  is seen to indicate osteomyelitis.           This report was signed and finalized on 3/29/2024 6:58 PM by Dr. Gomez Mcgill MD.               Culture:  Blood Culture   Date Value Ref Range Status   03/26/2024 Staphylococcus aureus, MRSA (C)  Final     Comment:       Infectious disease consultation is highly recommended to rule out distant foci of infection.  Methicillin resistant Staphylococcus aureus, Patient may be an isolation risk.   03/26/2024 Staphylococcus aureus, MRSA (C)  Final     Comment:       Infectious disease consultation is highly recommended to rule out distant foci of infection.  Methicillin resistant Staphylococcus aureus, Patient may be an isolation risk.         Assessment    Acute kidney injury,/worsening  Acute tubular necrosis.  Baseline chronic kidney disease stage 3b  Type 2 diabetes, poorly controlled (A1c 10.8%)  Hypertension   Metabolic  encephalopathy--improving  Anemia of CKD  Hypokalemia--improved  Sepsis due to infection of left foot wound    Plan:  Hold p.o. Bumex.  Urinary electrolytes.  Monitor renal      Brian Lomas MD  3/30/2024  16:11 CDT      Electronically signed by Brian Lomas MD at 03/30/24 1614       Ritchie Arechiga MD at 03/30/24 1410          Urology  Length of Stay: 4  Patient Care Team:  Del Shetty MD as PCP - General (Family Medicine)  Emeka Garay MD as Cardiologist (Cardiology)  Cristopher Hannah MD as Consulting Physician (Gastroenterology)  Brian Lomas MD as Consulting Physician (Nephrology)  Karel Soliz MD as Surgeon (Neurosurgery)  Willy Romero APRN as Nurse Practitioner (Nurse Practitioner)  Dougie Taylor MD as Consulting Physician (Pulmonary Disease)    Chief Complaint: Lower urinary tract symptoms    Subjective     Interval History:   Patient said catheter removed.  He said he is voiding back to his baseline.  Apparently has been having some difficulty while he is at home.    Review of Systems:   Review of Systems    Objective       Intake/Output Summary (Last 24 hours) at 3/30/2024 1410  Last data filed at 3/30/2024 1050  Gross per 24 hour   Intake 720 ml   Output 1175 ml   Net -455 ml       [REMOVED] Urethral Catheter 16 Fr.-Output (mL): 50 mL (dc smith at 1642 pm)           Physical Exam:  Temp:  [97.3 °F (36.3 °C)-98.7 °F (37.1 °C)] 97.7 °F (36.5 °C)  Heart Rate:  [74-89] 76  Resp:  [16-18] 18  BP: ()/(45-58) 123/58  Bladder is not palpably distended       Results Review:       I reviewed the patient's new clinical results.  Lab Results (last 24 hours)       Procedure Component Value Units Date/Time    POC Glucose Once [[id number]]  (Abnormal) Collected: 03/30/24 1106    Specimen: Blood Updated: 03/30/24 1117     Glucose 145 mg/dL      Comment: : [id number] Julia Shelton ID: [id number]       Blood Culture With DARSHAN - Blood, Hand, Right [[id number]]  (Normal)  Collected: 03/29/24 0843    Specimen: Blood from Hand, Right Updated: 03/30/24 0915     Blood Culture No growth at 24 hours    POC Glucose Once [[id number]]  (Normal) Collected: 03/30/24 0805    Specimen: Blood Updated: 03/30/24 0816     Glucose 90 mg/dL      Comment: : [id number] Julia Shelton ID: [id number]       Comprehensive Metabolic Panel [[id number]]  (Abnormal) Collected: 03/30/24 0620    Specimen: Blood Updated: 03/30/24 0650     Glucose 104 mg/dL      BUN 51 mg/dL      Creatinine 2.74 mg/dL      Sodium 135 mmol/L      Potassium 3.5 mmol/L      Chloride 100 mmol/L      CO2 24.0 mmol/L      Calcium 8.2 mg/dL      Total Protein 5.5 g/dL      Albumin 2.8 g/dL      ALT (SGPT) 19 U/L      AST (SGOT) 32 U/L      Alkaline Phosphatase 95 U/L      Total Bilirubin 0.5 mg/dL      Globulin 2.7 gm/dL      A/G Ratio 1.0 g/dL      BUN/Creatinine Ratio 18.6     Anion Gap 11.0 mmol/L      eGFR 24.6 mL/min/1.73     Narrative:      GFR Normal >60  Chronic Kidney Disease <60  Kidney Failure <15      CBC & Differential [[id number]]  (Abnormal) Collected: 03/30/24 0620    Specimen: Blood Updated: 03/30/24 0632    Narrative:      The following orders were created for panel order CBC & Differential.  Procedure                               Abnormality         Status                     ---------                               -----------         ------                     CBC Auto Differential[[id number]]        Abnormal            Final result                 Please view results for these tests on the individual orders.    CBC Auto Differential [[id number]]  (Abnormal) Collected: 03/30/24 0620    Specimen: Blood Updated: 03/30/24 0632     WBC 5.69 10*3/mm3      RBC 3.58 10*6/mm3      Hemoglobin 10.0 g/dL      Hematocrit 30.6 %      MCV 85.5 fL      MCH 27.9 pg      MCHC 32.7 g/dL      RDW 14.6 %      RDW-SD 46.5 fl      MPV 11.7 fL      Platelets 155 10*3/mm3      Neutrophil % 60.4 %      Lymphocyte % 23.0 %      Monocyte %  11.8 %      Eosinophil % 4.0 %      Basophil % 0.4 %      Immature Grans % 0.4 %      Neutrophils, Absolute 3.44 10*3/mm3      Lymphocytes, Absolute 1.31 10*3/mm3      Monocytes, Absolute 0.67 10*3/mm3      Eosinophils, Absolute 0.23 10*3/mm3      Basophils, Absolute 0.02 10*3/mm3      Immature Grans, Absolute 0.02 10*3/mm3      nRBC 0.0 /100 WBC     Blood Culture With DARSHAN - Blood, Arm, Left [[id number]] Collected: 03/30/24 0620    Specimen: Blood from Arm, Left Updated: 03/30/24 0631    CANDIDA AURIS SCREEN - Swab, Axilla Right, Axilla Left and Groin [[id number]]  (Normal) Collected: 03/27/24 0351    Specimen: Swab from Axilla Right, Axilla Left and Groin Updated: 03/30/24 0415     Candida Auris Screen Culture No Candida auris isolated at 3 days    POC Glucose Once [[id number]]  (Abnormal) Collected: 03/29/24 2039    Specimen: Blood Updated: 03/29/24 2050     Glucose 174 mg/dL      Comment: : [id number] Willam EmmaMeter ID: [id number]       POC Glucose Once [[id number]]  (Normal) Collected: 03/29/24 1710    Specimen: Blood Updated: 03/29/24 1721     Glucose 129 mg/dL      Comment: : [id number] Familia DeannaMeter ID: [id number]             Imaging Results (Last 24 Hours)       Procedure Component Value Units Date/Time    MRI Foot Left With & Without Contrast [[id number]] Collected: 03/29/24 1856     Updated: 03/29/24 1901    Narrative:      EXAMINATION: MRI FOOT LEFT W WO CONTRAST-     3/29/2024 4:30 PM     HISTORY: Foot swelling, diabetic, osteomyelitis suspected, xray done;  E11.628-Type 2 diabetes mellitus with other skin complications;  L08.9-Local infection of the skin and subcutaneous tissue, unspecified;  E11.65-Type 2 diabetes mellitus with hyperglycemia; Z74.09-Other reduced  mobility     LEFT foot MRI without and with IV gadolinium contrast.  Axial, sagittal, and coronal sequences.     COMPARISON:  LEFT foot x-rays from 3/26/2024.     There is a tiny metal foreign body within the plantar soft  tissues  adjacent to the second toe and despite the small size it produces a  prominent amount of artifact related to image distortion.     I see no soft tissue abscess.     No discrete bone edema or bone enhancement is seen to indicate  osteomyelitis.       Impression:      1. There are some limitations to the study based on metal related  artifact.  2. No soft tissue abscess or definite bone marrow edema or enhancement  is seen to indicate osteomyelitis.           This report was signed and finalized on 3/29/2024 6:58 PM by Dr. Gomez Mcgill MD.               Medication Review:     Current Facility-Administered Medications:     acetaminophen (TYLENOL) tablet 650 mg, 650 mg, Oral, Q4H PRN, 650 mg at 03/27/24 1005 **OR** acetaminophen (TYLENOL) 160 MG/5ML oral solution 650 mg, 650 mg, Oral, Q4H PRN, 650 mg at 03/27/24 2109 **OR** acetaminophen (TYLENOL) suppository 650 mg, 650 mg, Rectal, Q4H PRN, Raquel Duncan, APRN    apixaban (ELIQUIS) tablet 5 mg, 5 mg, Oral, Q12H, Raquel Duncan APRN, 5 mg at 03/30/24 0836    ascorbic acid (VITAMIN C) tablet 1,000 mg, 1,000 mg, Oral, Daily, Rene Maloney MD, 1,000 mg at 03/30/24 0836    aspirin EC tablet 81 mg, 81 mg, Oral, Daily, Rene Maloney MD, 81 mg at 03/30/24 0836    sennosides-docusate (PERICOLACE) 8.6-50 MG per tablet 1 tablet, 1 tablet, Oral, BID, 1 tablet at 03/28/24 2126 **AND** polyethylene glycol (MIRALAX) packet 17 g, 17 g, Oral, Daily PRN **AND** bisacodyl (DULCOLAX) EC tablet 5 mg, 5 mg, Oral, Daily PRN **AND** bisacodyl (DULCOLAX) suppository 10 mg, 10 mg, Rectal, Daily PRN, Raquel Duncan, APRN    bumetanide (BUMEX) tablet 1 mg, 1 mg, Oral, Daily, Raquel Duncan APRN, 1 mg at 03/30/24 0836    calcitriol (ROCALTROL) capsule 0.5 mcg, 0.5 mcg, Oral, Daily, Rene Maloney MD, 0.5 mcg at 03/30/24 0836    carvedilol (COREG) tablet 3.125 mg, 3.125 mg, Oral, BID With Meals, Rene Maloney MD, 3.125 mg at 03/28/24 6567    dextrose  (D50W) (25 g/50 mL) IV injection 25 g, 25 g, Intravenous, Q15 Min PRN, Raquel Duncan APRN    dextrose (GLUTOSE) oral gel 15 g, 15 g, Oral, Q15 Min PRN, Raquel Duncan, SUSAN, 15 g at 03/27/24 1710    Diclofenac Sodium (VOLTAREN) 1 % gel 2 g, 2 g, Topical, 4x Daily PRN, Rene Maloney MD    donepezil (ARICEPT) tablet 10 mg, 10 mg, Oral, Daily, Raquel Duncan APRN, 10 mg at 03/30/24 0836    famotidine (PEPCID) tablet 20 mg, 20 mg, Oral, BID AC, Rene Maloney MD, 20 mg at 03/30/24 0836    glucagon (GLUCAGEN) injection 1 mg, 1 mg, Intramuscular, Q15 Min PRN, Raquel Duncan, SUSAN    haloperidol lactate (HALDOL) injection 5 mg, 5 mg, Intravenous, Q6H PRN, Rene Maloney MD    insulin detemir (LEVEMIR) injection 30 Units, 30 Units, Subcutaneous, Nightly, Raquel Duncan APRN, 30 Units at 03/29/24 2056    Insulin Lispro (humaLOG) injection 4-24 Units, 4-24 Units, Subcutaneous, 4x Daily AC & at Bedtime, Raquel Duncan APRN, 4 Units at 03/28/24 1838    insulin regular (humuLIN R,novoLIN R) injection 10 Units, 10 Units, Subcutaneous, TID ACNeal Andrew Coker, MD, 10 Units at 03/30/24 1132    isosorbide mononitrate (IMDUR) 24 hr tablet 30 mg, 30 mg, Oral, Q24H, Rene Maloney MD, 30 mg at 03/30/24 0836    Linezolid (ZYVOX) 600 mg 300 mL, 600 mg, Intravenous, Q12H, Raquel Duncan APRN, Last Rate: 300 mL/hr at 03/30/24 1132, 600 mg at 03/30/24 1132    LORazepam (ATIVAN) injection 1 mg, 1 mg, Intravenous, Q4H PRN, Rene Maloney MD, 1 mg at 03/29/24 0140    magnesium hydroxide (MILK OF MAGNESIA) suspension 10 mL, 10 mL, Oral, Daily PRN, Rene Maloney MD    melatonin tablet 6 mg, 6 mg, Oral, Nightly, Rene Maloney MD, 6 mg at 03/29/24 2036    midodrine (PROAMATINE) tablet 2.5 mg, 2.5 mg, Oral, TID AC, Rene Maloney MD, 2.5 mg at 03/30/24 1132    mupirocin (BACTROBAN) 2 % nasal ointment 1 Application, 1 Application, Each Nare, BID, Nadia Polo, APRN, 1  Application at 03/30/24 0836    nitroglycerin (NITROSTAT) SL tablet 0.4 mg, 0.4 mg, Sublingual, Q5 Min PRN, Raquel Duncan APRN    ondansetron ODT (ZOFRAN-ODT) disintegrating tablet 4 mg, 4 mg, Oral, Q6H PRN, 4 mg at 03/30/24 1132 **OR** ondansetron (ZOFRAN) injection 4 mg, 4 mg, Intravenous, Q6H PRN, Raquel Duncan APRN, 4 mg at 03/29/24 1837    oxyCODONE (ROXICODONE) immediate release tablet 10 mg, 10 mg, Oral, BID PRN, Raquel Duncan APRN, 10 mg at 03/30/24 0013    pregabalin (LYRICA) capsule 100 mg, 100 mg, Oral, Nightly, Rene Maloney MD, 100 mg at 03/29/24 2036    pregabalin (LYRICA) capsule 50 mg, 50 mg, Oral, Daily, Rene Maloney MD, 50 mg at 03/30/24 0841    rosuvastatin (CRESTOR) tablet 10 mg, 10 mg, Oral, Nightly, Rene Maloney MD, 10 mg at 03/29/24 2036    sodium chloride 0.9 % flush 10 mL, 10 mL, Intravenous, PRN, Steve De Jesus MD    sodium chloride 0.9 % flush 10 mL, 10 mL, Intravenous, Q12H, Raquel Duncan APRN, 10 mL at 03/30/24 0837    sodium chloride 0.9 % flush 10 mL, 10 mL, Intravenous, PRN, Raquel Duncan APRN    sodium chloride 0.9 % infusion 40 mL, 40 mL, Intravenous, PRN, Raquel Duncan APRN    sucralfate (CARAFATE) 1 GM/10ML suspension 1 g, 1 g, Oral, TID AC, Rene Maloney MD, 1 g at 03/30/24 1132    tamsulosin (FLOMAX) 24 hr capsule 0.4 mg, 0.4 mg, Oral, Daily, Rene Maloney MD, 0.4 mg at 03/30/24 0838    timolol (TIMOPTIC) 0.25 % ophthalmic solution 1 drop, 1 drop, Both Eyes, Q12H, Rene Maloney MD, 1 drop at 03/30/24 0837    vitamin D3 capsule 5,000 Units, 5,000 Units, Oral, Daily, Rene Maloney MD, 5,000 Units at 03/30/24 0836    zinc sulfate (ZINCATE) capsule 220 mg, 220 mg, Oral, Daily, Rene Maloney MD, 220 mg at 03/30/24 0836    Assessment/Plan:   BPH with lower urinary tract symptoms  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  -We will continue his tamsulosin at current dose of 0.4 mg.  -Will set him up to see Pepe Davis PA-C at [facility] urology.  -No further inpatient urologic management needed.    (Please note that portions of this note were completed with a voice recognition program.)  Ritchie Arechiga MD  03/30/24  14:10 CDT            Electronically signed by Ritchie Arechiga MD at 03/30/24 9315

## 2024-04-02 ENCOUNTER — APPOINTMENT (OUTPATIENT)
Dept: CARDIOLOGY | Facility: HOSPITAL | Age: 69
DRG: 871 | End: 2024-04-02
Payer: COMMERCIAL

## 2024-04-02 LAB
ANION GAP SERPL CALCULATED.3IONS-SCNC: 9 MMOL/L (ref 5–15)
BACTERIA SPEC AEROBE CULT: ABNORMAL
BASOPHILS # BLD AUTO: 0.03 10*3/MM3 (ref 0–0.2)
BASOPHILS NFR BLD AUTO: 0.4 % (ref 0–1.5)
BH CV ECHO MEAS - AO MAX PG: 10.4 MMHG
BH CV ECHO MEAS - AO MEAN PG: 7 MMHG
BH CV ECHO MEAS - AO V2 MAX: 161 CM/SEC
BH CV ECHO MEAS - AO V2 VTI: 33.6 CM
BH CV ECHO MEAS - AVA(I,D): 2.7 CM2
BH CV ECHO MEAS - EDV(CUBED): 125 ML
BH CV ECHO MEAS - EDV(MOD-SP4): 111 ML
BH CV ECHO MEAS - EF(MOD-SP4): 64.4 %
BH CV ECHO MEAS - ESV(CUBED): 68.9 ML
BH CV ECHO MEAS - ESV(MOD-SP4): 39.5 ML
BH CV ECHO MEAS - FS: 18 %
BH CV ECHO MEAS - IVS/LVPW: 0.86 CM
BH CV ECHO MEAS - IVSD: 1.2 CM
BH CV ECHO MEAS - LA DIMENSION: 5 CM
BH CV ECHO MEAS - LAT PEAK E' VEL: 8.5 CM/SEC
BH CV ECHO MEAS - LV DIASTOLIC VOL/BSA (35-75): 44.2 CM2
BH CV ECHO MEAS - LV MASS(C)D: 261.8 GRAMS
BH CV ECHO MEAS - LV MAX PG: 3 MMHG
BH CV ECHO MEAS - LV MEAN PG: 2 MMHG
BH CV ECHO MEAS - LV SYSTOLIC VOL/BSA (12-30): 15.7 CM2
BH CV ECHO MEAS - LV V1 MAX: 87.2 CM/SEC
BH CV ECHO MEAS - LV V1 VTI: 19.8 CM
BH CV ECHO MEAS - LVIDD: 5 CM
BH CV ECHO MEAS - LVIDS: 4.1 CM
BH CV ECHO MEAS - LVOT AREA: 4.5 CM2
BH CV ECHO MEAS - LVOT DIAM: 2.4 CM
BH CV ECHO MEAS - LVPWD: 1.4 CM
BH CV ECHO MEAS - MED PEAK E' VEL: 6.7 CM/SEC
BH CV ECHO MEAS - MV A MAX VEL: 33 CM/SEC
BH CV ECHO MEAS - MV DEC SLOPE: 502 CM/SEC2
BH CV ECHO MEAS - MV DEC TIME: 0.23 SEC
BH CV ECHO MEAS - MV E MAX VEL: 115 CM/SEC
BH CV ECHO MEAS - MV E/A: 3.5
BH CV ECHO MEAS - PA V2 MAX: 106 CM/SEC
BH CV ECHO MEAS - SI(MOD-SP4): 28.5 ML/M2
BH CV ECHO MEAS - SV(LVOT): 89.6 ML
BH CV ECHO MEAS - SV(MOD-SP4): 71.5 ML
BH CV ECHO MEAS - TAPSE (>1.6): 1.01 CM
BH CV ECHO MEASUREMENTS AVERAGE E/E' RATIO: 15.13
BH CV XLRA - TDI S': 10.8 CM/SEC
BUN SERPL-MCNC: 43 MG/DL (ref 8–23)
BUN/CREAT SERPL: 21.3 (ref 7–25)
CALCIUM SPEC-SCNC: 9.8 MG/DL (ref 8.6–10.5)
CHLORIDE SERPL-SCNC: 104 MMOL/L (ref 98–107)
CO2 SERPL-SCNC: 26 MMOL/L (ref 22–29)
CREAT SERPL-MCNC: 2.02 MG/DL (ref 0.76–1.27)
DEPRECATED RDW RBC AUTO: 46.1 FL (ref 37–54)
EGFRCR SERPLBLD CKD-EPI 2021: 35.5 ML/MIN/1.73
EOSINOPHIL # BLD AUTO: 0.28 10*3/MM3 (ref 0–0.4)
EOSINOPHIL NFR BLD AUTO: 3.7 % (ref 0.3–6.2)
ERYTHROCYTE [DISTWIDTH] IN BLOOD BY AUTOMATED COUNT: 14.3 % (ref 12.3–15.4)
GLUCOSE BLDC GLUCOMTR-MCNC: 100 MG/DL (ref 70–130)
GLUCOSE BLDC GLUCOMTR-MCNC: 145 MG/DL (ref 70–130)
GLUCOSE BLDC GLUCOMTR-MCNC: 200 MG/DL (ref 70–130)
GLUCOSE BLDC GLUCOMTR-MCNC: 351 MG/DL (ref 70–130)
GLUCOSE BLDC GLUCOMTR-MCNC: 87 MG/DL (ref 70–130)
GLUCOSE SERPL-MCNC: 106 MG/DL (ref 65–99)
GRAM STN SPEC: ABNORMAL
HCT VFR BLD AUTO: 34 % (ref 37.5–51)
HGB BLD-MCNC: 10.8 G/DL (ref 13–17.7)
IMM GRANULOCYTES # BLD AUTO: 0.03 10*3/MM3 (ref 0–0.05)
IMM GRANULOCYTES NFR BLD AUTO: 0.4 % (ref 0–0.5)
ISOLATED FROM: ABNORMAL
LYMPHOCYTES # BLD AUTO: 1.83 10*3/MM3 (ref 0.7–3.1)
LYMPHOCYTES NFR BLD AUTO: 24.1 % (ref 19.6–45.3)
MCH RBC QN AUTO: 27.8 PG (ref 26.6–33)
MCHC RBC AUTO-ENTMCNC: 31.8 G/DL (ref 31.5–35.7)
MCV RBC AUTO: 87.6 FL (ref 79–97)
MONOCYTES # BLD AUTO: 0.76 10*3/MM3 (ref 0.1–0.9)
MONOCYTES NFR BLD AUTO: 10 % (ref 5–12)
NEUTROPHILS NFR BLD AUTO: 4.66 10*3/MM3 (ref 1.7–7)
NEUTROPHILS NFR BLD AUTO: 61.4 % (ref 42.7–76)
NRBC BLD AUTO-RTO: 0 /100 WBC (ref 0–0.2)
PLATELET # BLD AUTO: 197 10*3/MM3 (ref 140–450)
PMV BLD AUTO: 11.7 FL (ref 6–12)
POTASSIUM SERPL-SCNC: 4.1 MMOL/L (ref 3.5–5.2)
RBC # BLD AUTO: 3.88 10*6/MM3 (ref 4.14–5.8)
SODIUM SERPL-SCNC: 139 MMOL/L (ref 136–145)
WBC NRBC COR # BLD AUTO: 7.59 10*3/MM3 (ref 3.4–10.8)

## 2024-04-02 PROCEDURE — 63710000001 INSULIN REGULAR HUMAN PER 5 UNITS: Performed by: EMERGENCY MEDICINE

## 2024-04-02 PROCEDURE — 97116 GAIT TRAINING THERAPY: CPT

## 2024-04-02 PROCEDURE — 25510000001 PERFLUTREN 6.52 MG/ML SUSPENSION: Performed by: HOSPITALIST

## 2024-04-02 PROCEDURE — 99232 SBSQ HOSP IP/OBS MODERATE 35: CPT | Performed by: INTERNAL MEDICINE

## 2024-04-02 PROCEDURE — 93306 TTE W/DOPPLER COMPLETE: CPT | Performed by: EMERGENCY MEDICINE

## 2024-04-02 PROCEDURE — 80048 BASIC METABOLIC PNL TOTAL CA: CPT | Performed by: INTERNAL MEDICINE

## 2024-04-02 PROCEDURE — 97110 THERAPEUTIC EXERCISES: CPT

## 2024-04-02 PROCEDURE — 93306 TTE W/DOPPLER COMPLETE: CPT

## 2024-04-02 PROCEDURE — 63710000001 INSULIN LISPRO (HUMAN) PER 5 UNITS: Performed by: NURSE PRACTITIONER

## 2024-04-02 PROCEDURE — 97535 SELF CARE MNGMENT TRAINING: CPT

## 2024-04-02 PROCEDURE — 63710000001 INSULIN DETEMIR PER 5 UNITS: Performed by: NURSE PRACTITIONER

## 2024-04-02 PROCEDURE — 82948 REAGENT STRIP/BLOOD GLUCOSE: CPT

## 2024-04-02 PROCEDURE — 25010000002 MORPHINE PER 10 MG: Performed by: INTERNAL MEDICINE

## 2024-04-02 PROCEDURE — 25010000002 LINEZOLID 600 MG/300ML SOLUTION: Performed by: INTERNAL MEDICINE

## 2024-04-02 PROCEDURE — 85025 COMPLETE CBC W/AUTO DIFF WBC: CPT | Performed by: INTERNAL MEDICINE

## 2024-04-02 RX ADMIN — INSULIN HUMAN 10 UNITS: 100 INJECTION, SOLUTION PARENTERAL at 11:30

## 2024-04-02 RX ADMIN — ASPIRIN 81 MG: 81 TABLET, COATED ORAL at 09:46

## 2024-04-02 RX ADMIN — CARVEDILOL 3.12 MG: 3.12 TABLET, FILM COATED ORAL at 17:33

## 2024-04-02 RX ADMIN — MORPHINE SULFATE 2 MG: 2 INJECTION, SOLUTION INTRAMUSCULAR; INTRAVENOUS at 11:29

## 2024-04-02 RX ADMIN — LINEZOLID 600 MG: 600 INJECTION, SOLUTION INTRAVENOUS at 11:31

## 2024-04-02 RX ADMIN — CARVEDILOL 3.12 MG: 3.12 TABLET, FILM COATED ORAL at 09:43

## 2024-04-02 RX ADMIN — SUCRALFATE 1 G: 1 SUSPENSION ORAL at 09:41

## 2024-04-02 RX ADMIN — INSULIN LISPRO 8 UNITS: 100 INJECTION, SOLUTION INTRAVENOUS; SUBCUTANEOUS at 11:44

## 2024-04-02 RX ADMIN — Medication 6 MG: at 20:35

## 2024-04-02 RX ADMIN — Medication 1 APPLICATION: at 20:38

## 2024-04-02 RX ADMIN — DOCUSATE SODIUM AND SENNOSIDES 1 TABLET: 8.6; 5 TABLET, FILM COATED ORAL at 09:42

## 2024-04-02 RX ADMIN — INSULIN DETEMIR 30 UNITS: 100 INJECTION, SOLUTION SUBCUTANEOUS at 20:49

## 2024-04-02 RX ADMIN — OXYCODONE HYDROCHLORIDE AND ACETAMINOPHEN 1000 MG: 500 TABLET ORAL at 09:44

## 2024-04-02 RX ADMIN — DOCUSATE SODIUM AND SENNOSIDES 1 TABLET: 8.6; 5 TABLET, FILM COATED ORAL at 20:37

## 2024-04-02 RX ADMIN — DICYCLOMINE HYDROCHLORIDE 20 MG: 10 CAPSULE ORAL at 22:34

## 2024-04-02 RX ADMIN — DONEPEZIL HYDROCHLORIDE 10 MG: 10 TABLET, FILM COATED ORAL at 09:47

## 2024-04-02 RX ADMIN — MIDODRINE HYDROCHLORIDE 2.5 MG: 2.5 TABLET ORAL at 17:33

## 2024-04-02 RX ADMIN — PREGABALIN 100 MG: 100 CAPSULE ORAL at 20:38

## 2024-04-02 RX ADMIN — ACETAMINOPHEN 650 MG: 325 TABLET, FILM COATED ORAL at 22:08

## 2024-04-02 RX ADMIN — Medication 1 APPLICATION: at 09:47

## 2024-04-02 RX ADMIN — INSULIN HUMAN 10 UNITS: 100 INJECTION, SOLUTION PARENTERAL at 09:48

## 2024-04-02 RX ADMIN — TIMOLOL MALEATE 1 DROP: 2.5 SOLUTION/ DROPS OPHTHALMIC at 14:35

## 2024-04-02 RX ADMIN — Medication 10 ML: at 09:49

## 2024-04-02 RX ADMIN — PREGABALIN 50 MG: 50 CAPSULE ORAL at 09:42

## 2024-04-02 RX ADMIN — MIDODRINE HYDROCHLORIDE 2.5 MG: 2.5 TABLET ORAL at 09:45

## 2024-04-02 RX ADMIN — MORPHINE SULFATE 2 MG: 2 INJECTION, SOLUTION INTRAMUSCULAR; INTRAVENOUS at 03:16

## 2024-04-02 RX ADMIN — Medication 10 ML: at 20:39

## 2024-04-02 RX ADMIN — OXYCODONE HYDROCHLORIDE 10 MG: 5 TABLET ORAL at 14:31

## 2024-04-02 RX ADMIN — ZINC SULFATE 220 MG (50 MG) CAPSULE 220 MG: CAPSULE at 09:47

## 2024-04-02 RX ADMIN — ISOSORBIDE MONONITRATE 30 MG: 30 TABLET, EXTENDED RELEASE ORAL at 09:44

## 2024-04-02 RX ADMIN — SUCRALFATE 1 G: 1 SUSPENSION ORAL at 17:32

## 2024-04-02 RX ADMIN — TIMOLOL MALEATE 1 DROP: 2.5 SOLUTION/ DROPS OPHTHALMIC at 20:35

## 2024-04-02 RX ADMIN — SUCRALFATE 1 G: 1 SUSPENSION ORAL at 11:28

## 2024-04-02 RX ADMIN — Medication 5000 UNITS: at 09:42

## 2024-04-02 RX ADMIN — APIXABAN 5 MG: 5 TABLET, FILM COATED ORAL at 20:37

## 2024-04-02 RX ADMIN — TAMSULOSIN HYDROCHLORIDE 0.4 MG: 0.4 CAPSULE ORAL at 09:45

## 2024-04-02 RX ADMIN — INSULIN HUMAN 10 UNITS: 100 INJECTION, SOLUTION PARENTERAL at 17:32

## 2024-04-02 RX ADMIN — OXYCODONE HYDROCHLORIDE 10 MG: 5 TABLET ORAL at 20:36

## 2024-04-02 RX ADMIN — PERFLUTREN 6.52 MG: 6.52 INJECTION, SUSPENSION INTRAVENOUS at 10:59

## 2024-04-02 RX ADMIN — ROSUVASTATIN CALCIUM 10 MG: 10 TABLET, FILM COATED ORAL at 20:37

## 2024-04-02 RX ADMIN — CALCITRIOL 0.5 MCG: 0.25 CAPSULE ORAL at 09:45

## 2024-04-02 RX ADMIN — LINEZOLID 600 MG: 600 INJECTION, SOLUTION INTRAVENOUS at 23:17

## 2024-04-02 RX ADMIN — MIDODRINE HYDROCHLORIDE 2.5 MG: 2.5 TABLET ORAL at 11:30

## 2024-04-02 RX ADMIN — APIXABAN 5 MG: 5 TABLET, FILM COATED ORAL at 09:43

## 2024-04-02 RX ADMIN — FAMOTIDINE 20 MG: 20 TABLET, FILM COATED ORAL at 09:45

## 2024-04-02 NOTE — PLAN OF CARE
Goal Outcome Evaluation:  Plan of Care Reviewed With: patient      Pt could not sleep overnight. Pain meds times 2. Wound care complete. VSS, CTM  Progress: no change

## 2024-04-02 NOTE — THERAPY TREATMENT NOTE
Acute Care - Physical Therapy Treatment Note  [facility]     Patient Name: Erick Luong  : 1955  MRN: [id number]  Today's Date: 2024      Visit Dx:     ICD-10-CM ICD-9-CM   1. Diabetic foot infection  E11.628 250.80    L08.9 686.9   2. Poorly controlled diabetes mellitus  E11.65 250.00   3. Impaired functional mobility and activity tolerance [Z74.09]  Z74.09 V49.89     Patient Active Problem List   Diagnosis    Obesity, unspecified obesity severity, unspecified obesity type    Essential hypertension    Type 2 diabetes mellitus with hyperglycemia, with long-term current use of insulin    Nonsmoker    Anemia due to chronic kidney disease    Class 3 severe obesity due to excess calories with body mass index (BMI) of 40.0 to 44.9 in adult    Anasarca    Sleep apnea with use of continuous positive airway pressure (CPAP)    Medically noncompliant    Diabetic ulcer of left midfoot associated with type 2 diabetes mellitus, with fat layer exposed    Diabetic polyneuropathy associated with type 2 diabetes mellitus    Spinal stenosis in cervical region    Degeneration of cervical intervertebral disc    Cervical radiculopathy    Degeneration of lumbar or lumbosacral intervertebral disc    Cervical myelopathy    Bilateral carpal tunnel syndrome    CAD (coronary artery disease)    GERD without esophagitis    BPH without obstruction/lower urinary tract symptoms    Stage 3b chronic kidney disease    Chronic diastolic heart failure    Type 2 myocardial infarction due to heart failure    Left carpal tunnel syndrome    Syncope and collapse, recurrent episodes    Poorly-controlled hypertension    Rhinovirus    Peripheral vascular disease    Chronic kidney disease (CKD), stage IV (severe)    Diabetic foot infection    Sepsis     Past Medical History:   Diagnosis Date    Arthritis     Autonomic disease     CAD (coronary artery disease) 2017    Cervical radiculopathy 2021    Chronic constipation with acute  exaccerbation 05/10/2021    Coronary artery disease     Degeneration of cervical intervertebral disc 08/11/2021    Diabetes mellitus     Diabetic foot ulcer 08/31/2020    Diabetic polyneuropathy associated with type 2 diabetes mellitus 01/18/2021    Elevated cholesterol     Gastroesophageal reflux disease 05/13/2019    Headache     HTN (hypertension), benign 05/03/2017    Hyperlipidemia     Hypertension     Mixed hyperlipidemia 02/07/2017    Multiple lung nodules 01/26/2020    5mm, 9 mm RLL identified 1/2020, not present 10/2019.    Myocardial infarction     Osteomyelitis 01/22/2020    Osteomyelitis of fifth toe of right foot 10/07/2019    Pancreatitis     Persistent insomnia 01/20/2020    Renal disorder     Sleep apnea 02/06/2017    Sleep apnea with use of continuous positive airway pressure (CPAP)     NON-COMPLIANT    Slow transit constipation 01/16/2019    Spinal stenosis in cervical region 09/16/2021    Vitamin D deficiency 03/02/2021     Past Surgical History:   Procedure Laterality Date    ABDOMINAL SURGERY      AMPUTATION FOOT / TOE Left 10/2021    5th digit     ANTERIOR CERVICAL DISCECTOMY W/ FUSION N/A 8/5/2022    Procedure: CERVICAL DISCECTOMY ANTERIOR WITH FUSION C5-6 with possible plating of C5-7 with neuromonitoring and 1 c-arm;  Surgeon: Karel Soliz MD;  Location:  [facility] OR;  Service: Neurosurgery;  Laterality: N/A;    APPENDECTOMY      BACK SURGERY      CARDIAC CATHETERIZATION Left 02/08/2021    Procedure: Left Heart Cath w poss intervention left anatomical snuff box acess;  Surgeon: Omkar Charles DO;  Location:  [facility] CATH INVASIVE LOCATION;  Service: Cardiology;  Laterality: Left;    CARDIAC SURGERY      CATARACT EXTRACTION      CERVICAL SPINE SURGERY      COLONOSCOPY N/A 01/31/2017    Normal exam repeat in 5 years    COLONOSCOPY N/A 02/11/2019    Mild acute inflammation    COLONOSCOPY W/ POLYPECTOMY  03/04/2014    Hyperplastic polyp    CORONARY ARTERY BYPASS GRAFT  10/2015     ENDOSCOPY  04/13/2011    Gastritis with hemorrhage    ENDOSCOPY N/A 05/05/2017    Normal exam    ENDOSCOPY N/A 02/11/2019    Gastritis    ENDOSCOPY N/A 09/01/2020    Non-erosive gastritis with hemorrhage    ENDOSCOPY N/A 02/10/2021    Esophagitis    FOOT SURGERY Left     INCISION AND DRAINAGE OF WOUND Left 09/2007    spider bite     PT Assessment (Last 12 Hours)       PT Evaluation and Treatment       [facility] Name 04/02/24 0756          Physical Therapy Time and Intention    Subjective Information complains of;weakness;fatigue;pain;nausea/vomiting  -     Document Type therapy note (daily note)  -     Mode of Treatment physical therapy  -[facility] Name 04/02/24 0756          General Information    Existing Precautions/Restrictions fall  cam boot  on LLE with transfers or walking; WBAT  -[facility] Name 04/02/24 0756          Pain    Pretreatment Pain Rating 7/10  -     Posttreatment Pain Rating 7/10  -     Pain Location - abdomen  -     Pain Intervention(s) Repositioned;Ambulation/increased activity  -[facility] Name 04/02/24 0756          Mobility    Extremity Weight-bearing Status left lower extremity  -     Left Lower Extremity (Weight-bearing Status) weight-bearing as tolerated (WBAT)  with cam boot on per Dr. Cerrato  -[facility] Name 04/02/24 0756          Bed Mobility    Supine-Sit [place] (Bed Mobility) --  sitting EOB  -     Sit-Supine [place] (Bed Mobility) --  CHAIR  -[facility] Name 04/02/24 0756          Sit-Stand Transfer    Sit-Stand [place] (Transfers) contact guard;verbal cues  -[facility] Name 04/02/24 0756          Stand-Sit Transfer    Stand-Sit [place] (Transfers) contact guard;verbal cues  -[facility] Name 04/02/24 0756          Gait/Stairs (Locomotion)    [place] Level (Gait) verbal cues;contact guard  -     Assistive Device (Gait) walker, front-wheeled  -     Distance in Feet (Gait) 25  -[facility] Name 04/02/24 0756          Motor Skills     Comments, Therapeutic Exercise BLE AROM 10 x 2 reps  -       Row Name 04/02/24 0756          Orthotics & Prosthetics Management    Orthosis Location AFO (ankle foot orthosis)  -     Additional Documentation Orthosis Location (Row)  -       Row Name 04/02/24 0756          Ankle Foot Orthosis Management    Type (Ankle/Foot Orthosis) left  CAM boot  -AH     Fabrication Comment (Ankle Foot Orthosis) L CAM boot  -AH     Wearing Schedule (Ankle Foot Orthosis) wear when out of bed only  -       Row Name             Wound 06/15/23 0102 Left anterior plantar    Wound - Properties Group Placement Date: 06/15/23  -SM Placement Time: 0102  -SM Side: Left  -SM Orientation: anterior  -SM Location: plantar  -SM    Retired Wound - Properties Group Placement Date: 06/15/23  -SM Placement Time: 0102  -SM Side: Left  -SM Orientation: anterior  -SM Location: plantar  -SM    Retired Wound - Properties Group Date first assessed: 06/15/23  -SM Time first assessed: 0102  -SM Side: Left  -SM Location: plantar  -SM      Row Name             Wound 08/22/23 0140 Left posterior plantar    Wound - Properties Group Placement Date: 08/22/23  -AM Placement Time: 0140  -AM Present on Original Admission: Y  -AM Side: Left  -AM Orientation: posterior  -AM Location: plantar  -AM    Retired Wound - Properties Group Placement Date: 08/22/23  -AM Placement Time: 0140  -AM Present on Original Admission: Y  -AM Side: Left  -AM Orientation: posterior  -AM Location: plantar  -AM    Retired Wound - Properties Group Date first assessed: 08/22/23  -AM Time first assessed: 0140  -AM Present on Original Admission: Y  -AM Side: Left  -AM Location: plantar  -AM      Row Name             Wound Left lateral foot Diabetic Ulcer    Wound - Properties Group Side: Left  -MH Orientation: lateral  -MH Location: foot  -JACIEL, 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;diabetic foot ulcer/gangrene  Primary Wound Type: Diabetic ulc  -JACIEL Wound Outcome: Amputation  -JACIEL Stage, Pressure  Injury : other (see comments)  -JACIEL, full thickness starting 10/20/21     Retired Wound - Properties Group Side: Left  - Orientation: lateral  - Location: foot  -JACIEL, 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;diabetic foot ulcer/gangrene  Primary Wound Type: Diabetic ulc  -JACIEL Stage, Pressure Injury : other (see comments)  -JACIEL, full thickness starting 10/20/21  Wound Outcome: Amputation  -JACIEL    Retired Wound - Properties Group Side: Left  - Location: foot  -JACIEL, 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;diabetic foot ulcer/gangrene  Primary Wound Type: Diabetic ulc  -JACIEL Wound Outcome: Amputation  -JACIEL      Row Name 04/02/24 0756          Plan of Care Review    Plan of Care Reviewed With patient  -     Progress improving  -     Outcome Evaluation pt sitting EOB, dong L CAM boot, sit-stand cga, pt amb 25 feet rwx cga, trans to chair cga, cues for safety, BLE AROM 10 x2 reps, pt would benefit from cont therapy  -       Row Name 04/02/24 0756          Positioning and Restraints    Pre-Treatment Position in bed  -     Post Treatment Position chair  -     In Chair reclined;call light within reach;encouraged to call for assist;exit alarm on  -               User Key  (r) = Recorded By, (t) = Taken By, (c) = Cosigned By      Initials Name Provider Type     Chery Rosado, PTA Physical Therapist Assistant    Yuliana Lisa RN Registered Nurse    MH Haase, Mallory L, RN Registered Nurse    Faviola Kunz RN Registered Nurse    Michelle Diaz RN Registered Nurse                    Physical Therapy Education       Title: PT OT SLP Therapies (In Progress)       Topic: Physical Therapy (In Progress)       Point: Mobility training (Done)       Learning Progress Summary             Patient Acceptance, E,TB,D, VU,NR by NIKO at 3/29/2024 1009    Comment: education re: purpose of PT/importance of activity, safety/falls prevention, NWB L LE, improved tech w/ bed mobility, positioning for pressure relief                         Point:  Home exercise program (Not Started)       Learner Progress:  Not documented in this visit.              Point: Precautions (Done)       Learning Progress Summary             Patient Acceptance, E,TB,D, VU,NR by NIKO at 3/29/2024 1009    Comment: education re: purpose of PT/importance of activity, safety/falls prevention, NWB L LE, improved tech w/ bed mobility, positioning for pressure relief                                         User Key       Initials Effective Dates Name Provider Type Discipline     08/02/18 -  Melly Mustafa, PT Physical Therapist PT                  PT Recommendation and Plan     Plan of Care Reviewed With: patient  Progress: improving  Outcome Evaluation: pt sitting EOB, dong L CAM boot, sit-stand cga, pt amb 25 feet rwx cga, trans to chair cga, cues for safety, BLE AROM 10 x2 reps, pt would benefit from cont therapy   Outcome Measures       Row Name 04/02/24 0800 04/01/24 1300 03/30/24 1300       How much help from another person do you currently need...    Turning from your back to your side while in flat bed without using bedrails? 4  - 4  -AH 4  -KJ    Moving from lying on back to sitting on the side of a flat bed without bedrails? 3  - 3  -AH 4  -KJ    Moving to and from a bed to a chair (including a wheelchair)? 3  - 3  -AH 3  -KJ    Standing up from a chair using your arms (e.g., wheelchair, bedside chair)? 3  - 3  -AH 3  -KJ    Climbing 3-5 steps with a railing? 2  -AH 2  -AH 3  -KJ    To walk in hospital room? 3  - 3  -AH 3  -KJ    AM-PAC 6 Clicks Score (PT) 18  - 18  -AH 20  -KJ    Highest Level of Mobility Goal 6 --> Walk 10 steps or more  - 6 --> Walk 10 steps or more  - 6 --> Walk 10 steps or more  -KJ       Functional Assessment    Outcome Measure Options AM-PAC 6 Clicks Basic Mobility (PT)  - AM-PAC 6 Clicks Basic Mobility (PT)  - AM-PAC 6 Clicks Basic Mobility (PT)  -KJ              User Key  (r) = Recorded By, (t) = Taken By, (c) = Cosigned By       Initials Name Provider Type     Chery Rosado, PARUL Physical Therapist Assistant    Claudia Maddox, PARUL Physical Therapist Assistant                     Time Calculation:    PT Charges       Row Name 04/02/24 0822             Time Calculation    Start Time 0756  -      Stop Time 0819  -      Time Calculation (min) 23 min  -      PT Received On 04/02/24  -         Time Calculation- PT    Total Timed Code Minutes- PT 23 minute(s)  -         Timed Charges    75863 - PT Therapeutic Exercise Minutes 10  -AH      25587 - Gait Training Minutes  13  -AH         Total Minutes    Timed Charges Total Minutes 23  -AH       Total Minutes 23  -AH                User Key  (r) = Recorded By, (t) = Taken By, (c) = Cosigned By      Initials Name Provider Type     Chery Rosado, PARUL Physical Therapist Assistant                  Therapy Charges for Today       Code Description Service Date Service Provider Modifiers Qty    00657601330 HC PT THER PROC EA 15 MIN 4/1/2024 Chery Rosado, PTA GP 1    55697492026 HC GAIT TRAINING EA 15 MIN 4/1/2024 Chery Rosado, PTA GP 1    12042759199 HC PT THER PROC EA 15 MIN 4/2/2024 Chery Rosado, PTA GP 1    08678003273 HC GAIT TRAINING EA 15 MIN 4/2/2024 Chery Rosado, PTA GP 1            PT G-Codes  Outcome Measure Options: AM-PAC 6 Clicks Basic Mobility (PT)  AM-PAC 6 Clicks Score (PT): 18  AM-PAC 6 Clicks Score (OT): 16    Chery Rosado PTA  4/2/2024

## 2024-04-02 NOTE — PLAN OF CARE
Goal Outcome Evaluation:  Plan of Care Reviewed With: patient        Progress: improving  Outcome Evaluation: OT tx completed. Pt seated in recliner upon therapist arrival; A&Ox4; c/o 8/10 pain in lower abdomen. As therapist approached Pt, Pt began to stand despite therapist cues that CAM boot had to first be donned. Pt stated that he had to quickly utilize urinal and could not do so in a seated position. Pt performed toileting task utilizing urinal in standing position with SBA. Once Pt returned to seated position, therapist provided additional education on importance of wearing CAM boot per MD orders; Pt verbalized understanding. Pt also observed to have soiled gown; Pt donned new gown in seated position with Min A to correctly orient gown. Pt requested to rest at this point due to abdominal pain. Pt continues to benefit from skilled OT intervention in order to address remaining deficits in fxl mobility, fxl activity tolerance, balance, strength, and use of adaptive techniques/equpiment during performance of BADLs. Recommend sub acute rehab at discharge.      Anticipated Discharge Disposition (OT): sub acute care setting

## 2024-04-02 NOTE — PLAN OF CARE
Goal Outcome Evaluation:           Progress: no change          patient medicated per pain per orders. medication effective for patient. wound care completed as ordered. vital signs stable throughout the shift. sliding scale insulin given per order.

## 2024-04-02 NOTE — PROGRESS NOTES
"Adult Nutrition  Assessment/PES    Patient Name:  Erick Luong  YOB: 1955  MRN: [id number]  Admit Date:  3/26/2024    Assessment Date:  4/2/2024     Reason for Assessment       Row Name 04/02/24 1314          Reason for Assessment    Reason For Assessment follow-up protocol                    Nutrition/Diet History       Row Name 04/02/24 1406          Nutrition/Diet History    Typical Intake (Food/Fluid/EN/PN) NTN follow-up. Visited pt at bedside. When asked pt about appetite pt stated, \"I just fell very sick.\" observed pt consumed ~25% of lunch. Pt ordered ONS (Boost Glucose Control)  TID. Encouraged consumption of ONS to prevent significant weight loss. Pt stated he is trying to consume ONS. Pt stated pain is in abdomen. Last BM 3/30. Pt ordered laxative  and stool softener. Per intake report, 1-day intake average 87.5% over 2 meals. Continue to encourage intake. Will continue to follow per protocol.     Factors Affecting Nutritional Intake appetite;nausea                    Labs/Tests/Procedures/Meds       Row Name 04/02/24 1314          Labs/Procedures/Meds    Lab Results Reviewed reviewed, pertinent     Lab Results Comments BUN 43, Cr 2.02, GFR 35.5, Glu 200        Diagnostic Tests/Procedures    Diagnostic Test/Procedure Reviewed reviewed        Medications    Pertinent Medications Reviewed reviewed     Pertinent Medications Comments see MAR                    Physical Findings       Row Name 04/02/24 1317          Physical Findings    Overall Physical Appearance Room air. Last BM 3/30. Wound-left posterior plantar. WOund-left anterior plantar. Wound-left lateral foot diabetic ulcer.                    Estimated/Assessed Needs - Anthropometrics       Row Name 04/02/24 1030          Anthropometrics    Height 182.9 cm (72\")     Weight 133 kg (294 lb)                    Nutrition Prescription Ordered       Row Name 04/02/24 1317          Nutrition Prescription PO    Current PO Diet Regular     " Fluid Consistency Thin     Supplement Boost Glucose Control (Glucerna Shake)     Supplement Frequency 3 times a day     Common Modifiers Cardiac;Consistent Carbohydrate                    Evaluation of Received Nutrient/Fluid Intake       Row Name 04/02/24 1317          Nutrient/Fluid Evaluation    Number of Days Evaluated 3 days     Additional Documentation Fluid Intake Evaluation (Group)        Fluid Intake Evaluation    Oral Fluid (mL) 560        PO Evaluation    Number of Days PO Intake Evaluated 1 day     Number of Meals 2     % PO Intake 87.5                       Problem/Interventions:   Problem 1       Row Name 04/02/24 1406          Nutrition Diagnoses Problem 1    Problem 1 Inadequate Nutrient Intake     Etiology (related to) Factors Affecting Nutrition     Reported/Observed By RD/Tech     Mental State/Condition Discomfort     Signs/Symptoms (evidenced by) Report of Mnimal PO Intake;PO Intake;Report/Observation     Percent (%) intake recorded 87.5 %     Over number of meals 2     Appetite Poor at this time                          Intervention Goal       Row Name 04/02/24 1407          Intervention Goal    General Reduce/improve symptoms;Meet nutritional needs for age/condition;Disease management/therapy     PO Tolerate PO;Increase intake     Weight Appropriate weight loss                    Nutrition Intervention       Row Name 04/02/24 1407          Nutrition Intervention    RD/Tech Action Care plan reviewd;Follow Tx progress;Encourage intake                      Education/Evaluation       Row Name 04/02/24 1407          Education    Education No discharge needs identified at this time        Monitor/Evaluation    Monitor Per protocol                     Electronically signed by:  Igor Schroeder RD  04/02/24 14:07 CDT

## 2024-04-02 NOTE — PROGRESS NOTES
"INFECTIOUS DISEASES PROGRESS NOTE    Patient:  Erick Luong  YOB: 1955  MRN: [id number]   Admit date: 3/26/2024   Admitting Physician: Latanya Paez MD  Primary Care Physician: Del Shetty MD    Chief Complaint: Patient initially offers no complaints.    Interval History: Patient initially had no complaints.  I told him that I spoke with his wife earlier and she said that he was not feeling well and had some abdominal complaints.  He then said, \"I do not feel good\".  I asked him if he could be more specific in he just said, \"I just do not feel good\".    He denied myalgias or arthralgias.  He did say he was having some abdominal pain.  He was not having nausea or diarrhea.  He said his bowels are moving.  He pointed to his whole abdomen.    He said he is eating well.        Allergies:   Allergies   Allergen Reactions    Cefepime Hives and Anaphylaxis    Bactrim [Sulfamethoxazole-Trimethoprim] Other (See Comments)     \"RENAL FAILURE\"    Vancomycin Itching    Zolpidem Unknown - High Severity     \"makes him crazy\"    Zolpidem Tartrate Unknown - Low Severity and Provider Review Needed    Metronidazole Rash       Current Scheduled Medications:   apixaban, 5 mg, Oral, Q12H  ascorbic acid, 1,000 mg, Oral, Daily  aspirin, 81 mg, Oral, Daily  calcitriol, 0.5 mcg, Oral, Daily  carvedilol, 3.125 mg, Oral, BID With Meals  donepezil, 10 mg, Oral, Daily  famotidine, 20 mg, Oral, Daily  insulin detemir, 30 Units, Subcutaneous, Nightly  insulin lispro, 4-24 Units, Subcutaneous, 4x Daily AC & at Bedtime  insulin regular, 10 Units, Subcutaneous, TID AC  isosorbide mononitrate, 30 mg, Oral, Q24H  Linezolid, 600 mg, Intravenous, Q12H  melatonin, 6 mg, Oral, Nightly  midodrine, 2.5 mg, Oral, TID AC  mupirocin, 1 Application, Each Nare, BID  pregabalin, 100 mg, Oral, Nightly  pregabalin, 50 mg, Oral, Daily  rosuvastatin, 10 mg, Oral, Nightly  senna-docusate sodium, 1 tablet, Oral, BID  sodium chloride, 10 mL, " "Intravenous, Q12H  sucralfate, 1 g, Oral, TID AC  tamsulosin, 0.4 mg, Oral, Daily  timolol, 1 drop, Both Eyes, Q12H  vitamin D3, 5,000 Units, Oral, Daily  zinc sulfate, 220 mg, Oral, Daily      Current PRN Medications:    acetaminophen **OR** acetaminophen **OR** acetaminophen    senna-docusate sodium **AND** polyethylene glycol **AND** bisacodyl **AND** bisacodyl    dextrose    dextrose    Diclofenac Sodium    dicyclomine    glucagon (human recombinant)    haloperidol lactate    magnesium hydroxide    Morphine    nitroglycerin    ondansetron ODT **OR** ondansetron    oxyCODONE    sodium chloride    sodium chloride    sodium chloride            Objective     Vital Signs:  Temp (24hrs), Av.4 °F (36.3 °C), Min:96.8 °F (36 °C), Max:97.7 °F (36.5 °C)      /68 (BP Location: Right arm, Patient Position: Lying)   Pulse 60   Temp 96.8 °F (36 °C) (Oral)   Resp 16   Ht 182.9 cm (72\")   Wt 134 kg (294 lb 12.8 oz)   SpO2 100%   BMI 39.98 kg/m²         Physical Exam:  General: The patient is sitting up in recliner with breakfast tray in front of him.  He was in no acute distress  Respiratory: Effort even and unlabored, he is not conversationally dyspneic  Abdomen: Obese, soft, no rebound or guarding appreciated  Left lower extremity dressing in place.  And intact      esults Review:    I reviewed the patient's new clinical results.    Lab Results:    CBC:   Lab Results   Lab 24  0449 24  1004 24  0551 24  0620 24  0526 24  0402 24  0617   WBC 12.75* 9.45 6.08 5.69 5.01 5.15 7.59   HEMOGLOBIN 11.8* 13.7 11.1* 10.0* 10.3* 9.9* 10.8*   HEMATOCRIT 35.9* 41.1 33.6* 30.6* 31.6* 30.6* 34.0*   PLATELETS 163 154 156 155 149 162 197        AutoDiff:   Lab Results   Lab 24  0526 24  0402 24  0617   NEUTROPHIL % 55.3 55.5 61.4   LYMPHOCYTE % 28.9 28.3 24.1   MONOCYTES % 10.8 10.7 10.0   EOSINOPHIL % 4.2 4.7 3.7   BASOPHIL % 0.4 0.4 0.4   NEUTROS ABS 2.77 2.86 4.66 "   LYMPHS ABS 1.45 1.46 1.83   MONOS ABS 0.54 0.55 0.76   EOS ABS 0.21 0.24 0.28   BASOS ABS 0.02 0.02 0.03        Manual Diff:    Lab Results   Lab 03/31/24  0526 04/01/24  0402 04/02/24  0617   NEUTROS ABS 2.77 2.86 4.66           CMP:   Lab Results   Lab 03/29/24  0551 03/30/24  0620 03/31/24  0526 04/01/24  0402 04/02/24  0656   SODIUM 138 135* 136 135* 139   POTASSIUM 3.3* 3.5 3.6 4.1 4.1   CHLORIDE 103 100 101 101 104   CO2 23.0 24.0 23.0 23.0 26.0   BUN 40* 51* 51* 46* 43*   CREATININE 2.07* 2.74* 2.52* 2.18* 2.02*   CALCIUM 8.4* 8.2* 8.6 9.1 9.8   BILIRUBIN 0.7 0.5 0.5  --   --    ALK PHOS 81 95 110  --   --    ALT (SGPT) 17 19 19  --   --    AST (SGOT) 32 32 30  --   --    GLUCOSE 120* 104* 201* 292* 106*       Estimated Creatinine Clearance: 50.2 mL/min (A) (by C-G formula based on SCr of 2.02 mg/dL (H)).    Culture Results:    Microbiology Results (last 10 days)       Procedure Component Value - Date/Time    Blood Culture With DARSHAN - Blood, Hand, Right [[id number]]  (Normal) Collected: 04/01/24 0402    Lab Status: Preliminary result Specimen: Blood from Hand, Right Updated: 04/02/24 0445     Blood Culture No growth at 24 hours    Blood Culture With DARSHAN - Blood, Arm, Left [[id number]]  (Normal) Collected: 03/30/24 0620    Lab Status: Preliminary result Specimen: Blood from Arm, Left Updated: 04/02/24 0645     Blood Culture No growth at 3 days    Blood Culture With DARSHAN - Blood, Hand, Right [[id number]]  (Abnormal)  (Susceptibility) Collected: 03/29/24 0843    Lab Status: Final result Specimen: Blood from Hand, Right Updated: 04/02/24 0521     Blood Culture Staphylococcus aureus, MRSA     Comment:   Methicillin resistant Staphylococcus aureus, Patient may be an isolation risk.  Infectious disease consultation is highly recommended to rule out distant foci of infection.        Isolated from Aerobic Bottle     Gram Stain Aerobic Bottle Gram positive cocci    Susceptibility        Staphylococcus aureus, MRSA       MATT      Gentamicin Susceptible      Oxacillin Resistant      Rifampin Susceptible      Vancomycin Susceptible                       Susceptibility Comments       Staphylococcus aureus, MRSA    This isolate is presumed to be clindamycin resistant based on detection of inducible clindamycin resistance.  Clindamycin may still be effective in some patients.               Blood Culture ID, PCR - Blood, Hand, Right [[id number]]  (Abnormal) Collected: 03/29/24 0843    Lab Status: Final result Specimen: Blood from Hand, Right Updated: 03/30/24 2028     BCID, PCR Staph aureus. mecA/C and MREJ (methicillin resistance gene) detected. Identification by BCID2 PCR.     BOTTLE TYPE Aerobic Bottle    Narrative:      Infectious disease consultation is highly recommended to rule out distant foci of infection.    MRSA Screen, PCR (Inpatient) - Swab, Nares [[id number]]  (Abnormal) Collected: 03/28/24 2128    Lab Status: Final result Specimen: Swab from Nares Updated: 03/28/24 2239     MRSA PCR MRSA Detected    Narrative:      The negative predictive value of this diagnostic test is high and should only be used to consider de-escalating anti-MRSA therapy. A positive result may indicate colonization with MRSA and must be correlated clinically.    AMAN AURIS SCREEN - Swab, Axilla Right, Axilla Left and Groin [[id number]]  (Normal) Collected: 03/27/24 0351    Lab Status: Final result Specimen: Swab from Axilla Right, Axilla Left and Groin Updated: 04/01/24 0415     Candida Auris Screen Culture No Candida auris isolated at 5 days    Respiratory Panel PCR w/COVID-19(SARS-CoV-2) [facility]HAEL/ANKUSH/SEAN/[facility] In-House, NP Swab in UTM/VTM, 2 HR TAT - Swab, Nasopharynx [[id number]]  (Normal) Collected: 03/26/24 1732    Lab Status: Final result Specimen: Swab from Nasopharynx Updated: 03/26/24 1905     ADENOVIRUS, PCR Not Detected     Coronavirus 229E Not Detected     Coronavirus HKU1 Not Detected     Coronavirus NL63 Not Detected     Coronavirus OC43  Not Detected     COVID19 Not Detected     Human Metapneumovirus Not Detected     Human Rhinovirus/Enterovirus Not Detected     Influenza A PCR Not Detected     Influenza B PCR Not Detected     Parainfluenza Virus 1 Not Detected     Parainfluenza Virus 2 Not Detected     Parainfluenza Virus 3 Not Detected     Parainfluenza Virus 4 Not Detected     RSV, PCR Not Detected     Bordetella pertussis pcr Not Detected     Bordetella parapertussis PCR Not Detected     Chlamydophila pneumoniae PCR Not Detected     Mycoplasma pneumo by PCR Not Detected    Narrative:      In the setting of a positive respiratory panel with a viral infection PLUS a negative procalcitonin without other underlying concern for bacterial infection, consider observing off antibiotics or discontinuation of antibiotics and continue supportive care. If the respiratory panel is positive for atypical bacterial infection (Bordetella pertussis, Chlamydophila pneumoniae, or Mycoplasma pneumoniae), consider antibiotic de-escalation to target atypical bacterial infection.    Blood Culture - Blood, Arm, Right [[id number]]  (Abnormal) Collected: 03/26/24 1346    Lab Status: Final result Specimen: Blood from Arm, Right Updated: 03/29/24 0513     Blood Culture Staphylococcus aureus, MRSA     Comment:   Infectious disease consultation is highly recommended to rule out distant foci of infection.  Methicillin resistant Staphylococcus aureus, Patient may be an isolation risk.        Isolated from Aerobic and Anaerobic Bottles     Gram Stain Aerobic Bottle Gram positive cocci      Anaerobic Bottle Gram positive cocci    Blood Culture - Blood, Arm, Left [[id number]]  (Abnormal)  (Susceptibility) Collected: 03/26/24 1346    Lab Status: Final result Specimen: Blood from Arm, Left Updated: 03/29/24 0513     Blood Culture Staphylococcus aureus, MRSA     Comment:   Infectious disease consultation is highly recommended to rule out distant foci of infection.  Methicillin  resistant Staphylococcus aureus, Patient may be an isolation risk.        Isolated from Aerobic and Anaerobic Bottles     Gram Stain Aerobic Bottle Gram positive cocci      Anaerobic Bottle Gram positive cocci    Susceptibility        Staphylococcus aureus, MRSA      MATT      Gentamicin Susceptible      Oxacillin Resistant      Rifampin Susceptible      Vancomycin Susceptible                       Susceptibility Comments       Staphylococcus aureus, MRSA    This isolate is presumed to be clindamycin resistant based on detection of inducible clindamycin resistance.  Clindamycin may still be effective in some patients.               Blood Culture ID, PCR - Blood, Arm, Left [[id number]]  (Abnormal) Collected: 03/26/24 1346    Lab Status: Final result Specimen: Blood from Arm, Left Updated: 03/27/24 0426     BCID, PCR Staph aureus. mecA/C and MREJ (methicillin resistance gene) detected. Identification by BCID2 PCR.     BOTTLE TYPE Aerobic Bottle    Narrative:      Infectious disease consultation is highly recommended to rule out distant foci of infection.                 Radiology:   Imaging Results (Last 72 Hours)       ** No results found for the last 72 hours. **                Active Hospital Problems    Diagnosis     **Sepsis     Diabetic foot infection     Chronic kidney disease (CKD), stage IV (severe)     Chronic diastolic heart failure     BPH without obstruction/lower urinary tract symptoms     Diabetic polyneuropathy associated with type 2 diabetes mellitus     Anemia due to chronic kidney disease     Essential hypertension     Type 2 diabetes mellitus with hyperglycemia, with long-term current use of insulin        IMPRESSION:  MRSA bacteremia-blood cultures + March 26 and March 29.  Secondary to infected left foot with wounds and associated cellulitis on admission.  Blood culture March 30 remains negative to date.  And blood culture April 1 no growth at 24 hours  Chronic kidney disease stage IIIb-with acute  "kidney injury-Bumex discontinued.  Creatinine continues to improve.  Type 2 diabetes mellitus-poorly controlled with hemoglobin A1c of 10.7 this admission-  History of GI bleed-reviewed this with patient's wife.  Patient on Carafate and famotidine.  Per patient's wife he was on Protonix at 1 point but was switched to famotidine at recent rehab facility.  Hemoglobin remained stable.  Abdominal discomfort.  Patient denies nausea or vomiting or diarrhea.  He is eating well.  May be related to linezolid is nonspecific abdominal pain has been reported with linezolid.  MRSA nasal screen positive      RECOMMENDATION:   Continue linezolid-if patient continues to have abdominal pain, could consider switch to vancomycin however given chronic kidney disease and reaction listed as \"itching\" would like to avoid vancomycin.  Will need to monitor CBC closely given linezolid's risk of bone marrow suppression.  Wound care per Dr. Cerrato's orders  Continue to monitor surveillance blood cultures from  March 30 and April 1.  Follow-up on 2D echo-no report available currently.  Will place order for repeat photos with next dressing change.  Glucose management per attending    Currently plan for at least 2 weeks of linezolid with start date after clearance of blood cultures-currently March 30.  Should be able to transition to oral linezolid given increased bioavailability.  Duration may change based on echocardiogram findings.          Julia Adrian MD  04/02/24  08:49 CDT      "

## 2024-04-02 NOTE — PLAN OF CARE
Goal Outcome Evaluation:  Plan of Care Reviewed With: patient        Progress: improving  Outcome Evaluation: pt sitting EOB, dong nunn, sit-stand cga, pt amb 25 feet rwx cga, trans to chair cga, cues for safety, BLE AROM 10 x2 reps, pt would benefit from cont therapy

## 2024-04-02 NOTE — PROGRESS NOTES
[facility] Medicine Services  INPATIENT PROGRESS NOTE    Patient Name: Erick Luong  Date of Admission: 3/26/2024  Today's Date: 04/02/24  Length of Stay: 7  Primary Care Physician: Del Shetty MD    Subjective   Chief Complaint: Altered mental status  Erick Luong is a 68-year-old male with a past medical history of coronary artery disease, diastolic dysfunction followed by Dr. Garay, vascular disease, GERD, diabetes, chronic anemia, chronic nonhealing wound to the left foot followed by [facility], [place], operated by [facility] wound care.  Patient had an extended hospitalization 8/2023 due to syncope, subsequent admission to [facility] 9/18 - 10/11, 2023.  During that admission he did undergo debridement of Proteus wound infection.     Patient seen by PCP on 3/11/2024, started on prazosin 1 mg daily, duloxetine 60 mg daily magnesium hydroxide 400 mg daily per office note.  Wife is unaware of exactly what patient is taking.  Will need pharmacy to obtain correct med list.     W[facility] who performed debridement on the plantar wound treating with Santyl, is also a area on dorsal aspect of the foot.  Foot is erythemic, warm to touch.  Doctor told patient and wife he was doing well.  There was no signs of infection yesterday.  Today patient was brought to emergency department via EMS for altered mental status with disorientation, glucose was noted to be over 500 in the ambulance.  Initial glucose here 400 followed with 438 treated with regular insulin.  I have taken care of this patient many times.  He awakens for questions.  He is somewhat somnolent.  Remainder workup reveals creatinine 1.9 at baseline, CRP 4.3, lactic acid 3.2 and procalcitonin 6.56.  He does have a white count of 14.6, no bandemia.  Initially afebrile however now 100.7, patient meets criteria for sepsis with tachycardia and tachypnea, elevated lactic acid and source of infection.   There is no organ failure.  Patient is admitted for simple sepsis, condition stabl  3/31   Patient is alert and oriented x 3.  He appears to be in good spirits.  He is requesting for discharge.  He was counseled that we will need to wait until later in this week, after we set up IV antibiotics before we can discharge him.    4/1  Patient was admitted to ER on 3/26 with altered mental status. Had debridement of ongoing wound on left foot the day prior. Left foot warm and erythremic on arrival to ER. Noted to have elevated blood glucose over 400. Initial creatinine of 1.9.  Appreciate nephrology were consulted for acute on chronic renal failure MRI of the left foot did not show any osteomyelitis or abscess.  Hali ID patient was found to have MRSA bacteremia-blood cultures + March 26 and March 29.  Secondary to infected left foot with wounds and associated cellulitis on admission patient was started on Zyvox plan is for echo today rule out endocarditis given persistent bacteremia holding off on PICC line pending that   4/2  As before history of coronary disease diabetes chronic diabetic foot ulcer noncompliance with A1c 10.4 presented to us with mental status change was found to have MRSA sepsis bacteremia secondary to his infected foot MRI did not show any osteo or abscess has been on Zyvox echo was ordered to rule out endocarditis for persistent bacteremia, podiatry recommends wound care and follow-up with outpatient wound care appreciate ID Currently plan for at least 2 weeks of linezolid with start date after clearance of blood cultures-currently March 30. Should be able to transition to oral linezolid given increased bioavailability. Duration may change based on echocardiogram findings , appreciate nephrology acute on chronic renal failure secondary to ATN resolving  Review of Systems   All pertinent negatives and positives are as above. All other systems have been reviewed and are negative unless otherwise  stated.     Objective    Temp:  [96.8 °F (36 °C)-97.7 °F (36.5 °C)] 96.8 °F (36 °C)  Heart Rate:  [60-67] 60  Resp:  [16] 16  BP: (122-136)/(46-73) 126/68  Physical Exam  Constitutional:       General: He is not in acute distress.     Appearance: He is well-developed. He is not diaphoretic.   HENT:      Head: Normocephalic.   Eyes:      General: No scleral icterus.     Conjunctiva/sclera: Conjunctivae normal.      Pupils: Pupils are equal, round, and reactive to light.   Neck:      Thyroid: No thyromegaly.      Vascular: No JVD.      Trachea: No tracheal deviation.   Cardiovascular:      Rate and Rhythm: Normal rate and regular rhythm.      Heart sounds: Normal heart sounds. No murmur heard.     No friction rub. No gallop.   Pulmonary:      Effort: Pulmonary effort is normal. No respiratory distress.      Breath sounds: Normal breath sounds. No stridor. No wheezing or rales.   Chest:      Chest wall: No tenderness.   Abdominal:      General: Bowel sounds are normal. There is no distension.      Palpations: Abdomen is soft. There is no mass.      Tenderness: There is no abdominal tenderness. There is no guarding or rebound.      Hernia: No hernia is present.   Musculoskeletal:         General: Swelling and signs of injury present. No tenderness or deformity. Normal range of motion.      Cervical back: Normal range of motion and neck supple.      Right lower leg: No edema.      Comments: Dressing over left foot noted.   Lymphadenopathy:      Cervical: No cervical adenopathy.   Skin:     General: Skin is warm and dry.      Coloration: Skin is not pale.      Findings: No erythema or rash.   Neurological:      General: No focal deficit present.      Mental Status: He is alert and oriented to person, place, and time.      Cranial Nerves: No cranial nerve deficit.      Sensory: No sensory deficit.      Motor: No abnormal muscle tone.      Coordination: Coordination normal.   Psychiatric:         Mood and Affect: Mood  normal.         Behavior: Behavior normal.            File Link    Scan on 3/26/2024 1423 by Ro Vinson RN: Wound 06/15/23 0102 Left anterior plantar        Key Information    Document ID File Type Document Type Description   [id number] Image WOUND IMAGE Wound 06/15/23 0102 Left anterior plantar     Import Information    Attached At Date Time User Dept   Encounter Level 3/26/2024  2:23 PM Ro Vinson RN [facility] Emergency Dept     Encounter    Hospital Encounter on 3/26/24     Results Review:  I have reviewed the labs, radiology results, and diagnostic studies.    Laboratory Data:   Results from last 7 days   Lab Units 04/02/24  0617 04/01/24  0402 03/31/24  0526   WBC 10*3/mm3 7.59 5.15 5.01   HEMOGLOBIN g/dL 10.8* 9.9* 10.3*   HEMATOCRIT % 34.0* 30.6* 31.6*   PLATELETS 10*3/mm3 197 162 149        Results from last 7 days   Lab Units 04/02/24  0656 04/01/24  0402 03/31/24  0526 03/30/24  0620 03/29/24  0551   SODIUM mmol/L 139 135* 136 135* 138   POTASSIUM mmol/L 4.1 4.1 3.6 3.5 3.3*   CHLORIDE mmol/L 104 101 101 100 103   CO2 mmol/L 26.0 23.0 23.0 24.0 23.0   BUN mg/dL 43* 46* 51* 51* 40*   CREATININE mg/dL 2.02* 2.18* 2.52* 2.74* 2.07*   CALCIUM mg/dL 9.8 9.1 8.6 8.2* 8.4*   BILIRUBIN mg/dL  --   --  0.5 0.5 0.7   ALK PHOS U/L  --   --  110 95 81   ALT (SGPT) U/L  --   --  19 19 17   AST (SGOT) U/L  --   --  30 32 32   GLUCOSE mg/dL 106* 292* 201* 104* 120*       Culture Data:   Blood Culture   Date Value Ref Range Status   03/26/2024 Abnormal Stain (C)  Preliminary   03/26/2024 Abnormal Stain (C)  Preliminary       Radiology Data:   Imaging Results (Last 24 Hours)       ** No results found for the last 24 hours. **            I have reviewed the patient's current medications.     Assessment/Plan   Assessment  Active Hospital Problems    Diagnosis     **Sepsis     Diabetic foot infection     Chronic kidney disease (CKD), stage IV (severe)     Chronic diastolic heart failure     BPH without  obstruction/lower urinary tract symptoms     Diabetic polyneuropathy associated with type 2 diabetes mellitus     Anemia due to chronic kidney disease     Essential hypertension     Type 2 diabetes mellitus with hyperglycemia, with long-term current use of insulin    MRSA bacteremia  Metabolic encephalopathy secondary to sepsis  WANDER on CKD    Treatment Plan  Continue IV antibiotics with Zyvox. Infectious disease input appreciated.  Mental status appears back to baseline now that sepsis is better controlled.  However he has persistently positive blood cultures.  Plan for 2D echo on Monday.  Will hold off on inserting PICC line for now until blood cultures are clear.    Podiatry consultation input appreciated.  Will follow recommendations.  Continue wound dressings as per podiatry Follow-up repeat blood cultures.    Nephrology and urology input appreciated.  Hold Bumex due to elevated creatinine and BUN.  No signs of urinary retention at this time.    Pain control with opiate pain medications.    Insulin sliding scale and Levemir for type 2 diabetes mellitus    DVT prophylaxis with Eliquis    PT/OT/wound care consultations    DVT prophylaxis with Eliquis    CODE STATUS is full code    Medical Decision Making  Number and Complexity of problems: 4 acute, severe, high complexity medical problems.  Multiple chronic medical problems.  Differential Diagnosis: None    Conditions and Status        Condition is worsening.     [facility] Data  External documents reviewed: None  Cardiac tracing (EKG, telemetry) interpretation: None  Radiology interpretation: None  Labs reviewed: CBC, BMP, blood cultures reviewed by me  Any tests that were considered but not ordered: None     Decision rules/scores evaluated (example [id number], Wells, etc): N/A     Discussed with: Patient     Care Planning  Shared decision making: Patient and his wife  Code status and discussions: CODE STATUS is full code    Disposition  Social Determinants of Health  that impact treatment or disposition: None  I expect the patient to be discharged to home in 3 to 5 days.     Electronically signed by Latanya Paez MD, 04/02/24, 08:47 CDT.

## 2024-04-02 NOTE — PAYOR COMM NOTE
"4/2/24 [facility] [phone]  [phone]    FAXING DAILY CLINICAL 4/2/24.              Erick Luong (68 y.o. Male)       Date of Birth   1955    Social Security Number       Address   [address] [place] [address]    Home Phone   [phone]    MRN   [id number]       [facility]    Marital Status                               Admission Date   3/26/24    Admission Type   Emergency    Admitting Provider   Latanya Paez MD    Attending Provider   Latanya Paez MD    Department, Room/Bed   [facility] 3C, 372/1       Discharge Date       Discharge Disposition       Discharge Destination                                 Attending Provider: Latanya Paez MD    Allergies: Cefepime, Bactrim [Sulfamethoxazole-trimethoprim], Vancomycin, Zolpidem, Zolpidem Tartrate, Metronidazole    Isolation: Contact   Infection: MRSA (05/19/19), COVID (History) (08/08/22)   Code Status: CPR    Ht: 182.9 cm (72\")   Wt: 134 kg (294 lb 12.8 oz)    Admission Cmt: None   Principal Problem: Sepsis [A41.9]                   Active Insurance as of 3/26/2024       Primary Coverage       Payor Plan Insurance Group Employer/Plan Group    [employer] [facility] EMPLOYEE [id number]       Payor Plan Address Payor Plan Phone Number Payor Plan Fax Number Effective Dates    [address] [phone]  1/1/2022 - None Entered    [facility] [id number]         Subscriber Name Subscriber Birth Date Member ID       ZAINAB LUONG 11/27/1969 [id number]               Secondary Coverage       Payor Plan Insurance Group Employer/Plan Group    MEDICARE MEDICARE A & B        Payor Plan Address Payor Plan Phone Number Payor Plan Fax Number Effective Dates    [address] [phone]  7/1/2013 - None Entered    [facility] [id number]         Subscriber Name Subscriber Birth Date Member ID       ERICK LUONG 1955 [id number]                     Emergency Contacts       "  (Rel.) Home Phone Work Phone Mobile Phone    CheriseLev chrislis (Spouse) [phone]           [facility] Encounter Date/Time: 3/26/2024 [facility]   Hospital Account: [id number]    MRN: [id number]   Patient:  Erick Luong   Contact Serial #: [id number]   SSN:          ENCOUNTER             Patient Class: Inpatient   Unit: [address] Service: Medicine     Bed: 372/1   Admitting Provider: Latanya Paez MD   Referring Physician:     Attending Provider: Latanya Paez MD   Adm Diagnosis: Diabetic foot infection *               PATIENT             Name: Erick Luong : 1955 (68 yrs)   Address: [address] [place] [address]  Sex: Male   City: [place] [address]   County: [facility]   Marital Status:  Ethnicity: NOT                                                                         Race: WHITE   Primary Care Provider: Del Shetty MD Patients Phone: Home Phone: [phone]     Mobile Phone: [phone]     EMERGENCY CONTACT   Contact Name Legal Guardian? Relationship to Patient Home Phone Work Phone Mobile Phone   1. Joan Luong  2. *No Contact Specified* No    Spouse    [phone]      GUARANTOR             Guarantor: Erick Luong     : 1955   Address: [address] [facility] Sex: [place] [address]     Relation to Patient: Self       Home Phone: [phone]   Guarantor ID: [id number]       Work Phone:     GUARANTOR EMPLOYER   Employer:           Status: DISABLED   COVERAGE          PRIMARY INSURANCE   Payor: [employer] Plan: [employer]   Group Number: [id number] Insurance Type: INDEMNITY   Subscriber Name: LEV LUONGLINEMO SCHUMACHER Subscriber : 1969   Subscriber ID: [id number] Coverage Address: [facility] [address]  [place] [address]   Pat. Rel. to Subscriber: Spouse Coverage Phone: [phone]   SECONDARY INSURANCE   Payor: MEDICARE Plan: MEDICARE A & B   Group Number:    "Insurance Type: INDEMNITY   Subscriber Name: ERICK LUONG Subscriber : 1955   Subscriber ID: [id number] Coverage Address: [place] [id number]  [place] [address]   Pat. Rel. to Subscriber: SELF Coverage Phone: [phone]      Contact Serial # ([id number])         2024    Chart ID ([id number])            Julia Adrian MD   Physician  Infectious Disease     Progress Notes     Signed     Date of Service: 24  Creation Time: 24     Signed       Expand All Collapse All    INFECTIOUS DISEASES PROGRESS NOTE     Patient:  Erick Luong  YOB: 1955  MRN: [id number]       Admit date: 3/26/2024             Admitting Physician: Latanya Paez MD  Primary Care Physician: Del Shetty MD     Chief Complaint: Patient initially offers no complaints.     Interval History: Patient initially had no complaints.  I told him that I spoke with his wife earlier and she said that he was not feeling well and had some abdominal complaints.  He then said, \"I do not feel good\".  I asked him if he could be more specific in he just said, \"I just do not feel good\".     He denied myalgias or arthralgias.  He did say he was having some abdominal pain.  He was not having nausea or diarrhea.  He said his bowels are moving.  He pointed to his whole abdomen.     He said he is eating well.           Allergies:   Allergies         Allergies   Allergen Reactions    Cefepime Hives and Anaphylaxis    Bactrim [Sulfamethoxazole-Trimethoprim] Other (See Comments)       \"RENAL FAILURE\"    Vancomycin Itching    Zolpidem Unknown - High Severity       \"makes him crazy\"    Zolpidem Tartrate Unknown - Low Severity and Provider Review Needed    Metronidazole Rash            Current Scheduled Medications:     Scheduled Medication   apixaban, 5 mg, Oral, Q12H  ascorbic acid, 1,000 mg, Oral, Daily  aspirin, 81 mg, Oral, Daily  calcitriol, 0.5 mcg, Oral, Daily  carvedilol, 3.125 mg, Oral, " "BID With Meals  donepezil, 10 mg, Oral, Daily  famotidine, 20 mg, Oral, Daily  insulin detemir, 30 Units, Subcutaneous, Nightly  insulin lispro, 4-24 Units, Subcutaneous, 4x Daily AC & at Bedtime  insulin regular, 10 Units, Subcutaneous, TID AC  isosorbide mononitrate, 30 mg, Oral, Q24H  Linezolid, 600 mg, Intravenous, Q12H  melatonin, 6 mg, Oral, Nightly  midodrine, 2.5 mg, Oral, TID AC  mupirocin, 1 Application, Each Nare, BID  pregabalin, 100 mg, Oral, Nightly  pregabalin, 50 mg, Oral, Daily  rosuvastatin, 10 mg, Oral, Nightly  senna-docusate sodium, 1 tablet, Oral, BID  sodium chloride, 10 mL, Intravenous, Q12H  sucralfate, 1 g, Oral, TID AC  tamsulosin, 0.4 mg, Oral, Daily  timolol, 1 drop, Both Eyes, Q12H  vitamin D3, 5,000 Units, Oral, Daily  zinc sulfate, 220 mg, Oral, Daily         Current PRN Medications:    PRN Medication     acetaminophen **OR** acetaminophen **OR** acetaminophen    senna-docusate sodium **AND** polyethylene glycol **AND** bisacodyl **AND** bisacodyl    dextrose    dextrose    Diclofenac Sodium    dicyclomine    glucagon (human recombinant)    haloperidol lactate    magnesium hydroxide    Morphine    nitroglycerin    ondansetron ODT **OR** ondansetron    oxyCODONE    sodium chloride    sodium chloride    sodium chloride        Infusion Medications                   Objective[]Expand by Default  Vital Signs:  Temp (24hrs), Av.4 °F (36.3 °C), Min:96.8 °F (36 °C), Max:97.7 °F (36.5 °C)        /68 (BP Location: Right arm, Patient Position: Lying)   Pulse 60   Temp 96.8 °F (36 °C) (Oral)   Resp 16   Ht 182.9 cm (72\")   Wt 134 kg (294 lb 12.8 oz)   SpO2 100%   BMI 39.98 kg/m²            Physical Exam:  General: The patient is sitting up in recliner with breakfast tray in front of him.  He was in no acute distress  Respiratory: Effort even and unlabored, he is not conversationally dyspneic  Abdomen: Obese, soft, no rebound or guarding appreciated  Left lower extremity dressing " in place.  And intact        esults Review:    I reviewed the patient's new clinical results.     Lab Results:     CBC:             Lab Results   Lab 03/27/24  0449 03/28/24  1004 03/29/24  0551 03/30/24  0620 03/31/24  0526 04/01/24  0402 04/02/24  0617   WBC 12.75* 9.45 6.08 5.69 5.01 5.15 7.59   HEMOGLOBIN 11.8* 13.7 11.1* 10.0* 10.3* 9.9* 10.8*   HEMATOCRIT 35.9* 41.1 33.6* 30.6* 31.6* 30.6* 34.0*   PLATELETS 163 154 156 155 149 162 197         AutoDiff:         Lab Results   Lab 03/31/24  0526 04/01/24  0402 04/02/24  0617   NEUTROPHIL % 55.3 55.5 61.4   LYMPHOCYTE % 28.9 28.3 24.1   MONOCYTES % 10.8 10.7 10.0   EOSINOPHIL % 4.2 4.7 3.7   BASOPHIL % 0.4 0.4 0.4   NEUTROS ABS 2.77 2.86 4.66   LYMPHS ABS 1.45 1.46 1.83   MONOS ABS 0.54 0.55 0.76   EOS ABS 0.21 0.24 0.28   BASOS ABS 0.02 0.02 0.03         Manual Diff:          Lab Results   Lab 03/31/24  0526 04/01/24  0402 04/02/24  0617   NEUTROS ABS 2.77 2.86 4.66               CMP:           Lab Results   Lab 03/29/24  0551 03/30/24  0620 03/31/24  0526 04/01/24  0402 04/02/24  0656   SODIUM 138 135* 136 135* 139   POTASSIUM 3.3* 3.5 3.6 4.1 4.1   CHLORIDE 103 100 101 101 104   CO2 23.0 24.0 23.0 23.0 26.0   BUN 40* 51* 51* 46* 43*   CREATININE 2.07* 2.74* 2.52* 2.18* 2.02*   CALCIUM 8.4* 8.2* 8.6 9.1 9.8   BILIRUBIN 0.7 0.5 0.5  --   --    ALK PHOS 81 95 110  --   --    ALT (SGPT) 17 19 19  --   --    AST (SGOT) 32 32 30  --   --    GLUCOSE 120* 104* 201* 292* 106*         Estimated Creatinine Clearance: 50.2 mL/min (A) (by C-G formula based on SCr of 2.02 mg/dL (H)).     Culture Results:     Microbiology Results (last 10 days)         Procedure Component Value - Date/Time     Blood Culture With DARSHAN - Blood, Hand, Right [[id number]]  (Normal) Collected: 04/01/24 0402     Lab Status: Preliminary result Specimen: Blood from Hand, Right Updated: 04/02/24 0445       Blood Culture No growth at 24 hours     Blood Culture With DARSHAN - Blood, Arm, Left [[id number]]   (Normal) Collected: 03/30/24 0620     Lab Status: Preliminary result Specimen: Blood from Arm, Left Updated: 04/02/24 0645       Blood Culture No growth at 3 days     Blood Culture With DARSHAN - Blood, Hand, Right [[id number]]  (Abnormal)  (Susceptibility) Collected: 03/29/24 0843     Lab Status: Final result Specimen: Blood from Hand, Right Updated: 04/02/24 0521       Blood Culture Staphylococcus aureus, MRSA       Comment:    Methicillin resistant Staphylococcus aureus, Patient may be an isolation risk.  Infectious disease consultation is highly recommended to rule out distant foci of infection.          Isolated from Aerobic Bottle       Gram Stain Aerobic Bottle Gram positive cocci     Susceptibility                   Staphylococcus aureus, MRSA         MATT         Gentamicin Susceptible         Oxacillin Resistant         Rifampin Susceptible         Vancomycin Susceptible                                Susceptibility Comments         Staphylococcus aureus, MRSA     This isolate is presumed to be clindamycin resistant based on detection of inducible clindamycin resistance.  Clindamycin may still be effective in some patients.                  Blood Culture ID, PCR - Blood, Hand, Right [[id number]]  (Abnormal) Collected: 03/29/24 0843     Lab Status: Final result Specimen: Blood from Hand, Right Updated: 03/30/24 2028       BCID, PCR Staph aureus. mecA/C and MREJ (methicillin resistance gene) detected. Identification by BCID2 PCR.       BOTTLE TYPE Aerobic Bottle     Narrative:       Infectious disease consultation is highly recommended to rule out distant foci of infection.     MRSA Screen, PCR (Inpatient) - Swab, Nares [[id number]]  (Abnormal) Collected: 03/28/24 2128     Lab Status: Final result Specimen: Swab from Nares Updated: 03/28/24 2239       MRSA PCR MRSA Detected     Narrative:       The negative predictive value of this diagnostic test is high and should only be used to consider de-escalating anti-MRSA  therapy. A positive result may indicate colonization with MRSA and must be correlated clinically.     AMAN AURIS SCREEN - Swab, Axilla Right, Axilla Left and Groin [[id number]]  (Normal) Collected: 03/27/24 0351     Lab Status: Final result Specimen: Swab from Axilla Right, Axilla Left and Groin Updated: 04/01/24 0415       Candida Auris Screen Culture No Candida auris isolated at 5 days     Respiratory Panel PCR w/COVID-19(SARS-CoV-2) MICHAEL/ANKUSH/SEAN/PAD/COR/ROSE MARY In-House, NP Swab in UTM/VTM, 2 HR TAT - Swab, Nasopharynx [[id number]]  (Normal) Collected: 03/26/24 1732     Lab Status: Final result Specimen: Swab from Nasopharynx Updated: 03/26/24 1905       ADENOVIRUS, PCR Not Detected       Coronavirus 229E Not Detected       Coronavirus HKU1 Not Detected       Coronavirus NL63 Not Detected       Coronavirus OC43 Not Detected       COVID19 Not Detected       Human Metapneumovirus Not Detected       Human Rhinovirus/Enterovirus Not Detected       Influenza A PCR Not Detected       Influenza B PCR Not Detected       Parainfluenza Virus 1 Not Detected       Parainfluenza Virus 2 Not Detected       Parainfluenza Virus 3 Not Detected       Parainfluenza Virus 4 Not Detected       RSV, PCR Not Detected       Bordetella pertussis pcr Not Detected       Bordetella parapertussis PCR Not Detected       Chlamydophila pneumoniae PCR Not Detected       Mycoplasma pneumo by PCR Not Detected     Narrative:       In the setting of a positive respiratory panel with a viral infection PLUS a negative procalcitonin without other underlying concern for bacterial infection, consider observing off antibiotics or discontinuation of antibiotics and continue supportive care. If the respiratory panel is positive for atypical bacterial infection (Bordetella pertussis, Chlamydophila pneumoniae, or Mycoplasma pneumoniae), consider antibiotic de-escalation to target atypical bacterial infection.     Blood Culture - Blood, Arm, Right [[id number]]   (Abnormal) Collected: 03/26/24 1346     Lab Status: Final result Specimen: Blood from Arm, Right Updated: 03/29/24 0513       Blood Culture Staphylococcus aureus, MRSA       Comment:    Infectious disease consultation is highly recommended to rule out distant foci of infection.  Methicillin resistant Staphylococcus aureus, Patient may be an isolation risk.          Isolated from Aerobic and Anaerobic Bottles       Gram Stain Aerobic Bottle Gram positive cocci         Anaerobic Bottle Gram positive cocci     Blood Culture - Blood, Arm, Left [[id number]]  (Abnormal)  (Susceptibility) Collected: 03/26/24 1346     Lab Status: Final result Specimen: Blood from Arm, Left Updated: 03/29/24 0513       Blood Culture Staphylococcus aureus, MRSA       Comment:    Infectious disease consultation is highly recommended to rule out distant foci of infection.  Methicillin resistant Staphylococcus aureus, Patient may be an isolation risk.          Isolated from Aerobic and Anaerobic Bottles       Gram Stain Aerobic Bottle Gram positive cocci         Anaerobic Bottle Gram positive cocci     Susceptibility                   Staphylococcus aureus, MRSA         MATT         Gentamicin Susceptible         Oxacillin Resistant         Rifampin Susceptible         Vancomycin Susceptible                                Susceptibility Comments         Staphylococcus aureus, MRSA     This isolate is presumed to be clindamycin resistant based on detection of inducible clindamycin resistance.  Clindamycin may still be effective in some patients.                  Blood Culture ID, PCR - Blood, Arm, Left [[id number]]  (Abnormal) Collected: 03/26/24 1346     Lab Status: Final result Specimen: Blood from Arm, Left Updated: 03/27/24 0426       BCID, PCR Staph aureus. mecA/C and MREJ (methicillin resistance gene) detected. Identification by BCID2 PCR.       BOTTLE TYPE Aerobic Bottle     Narrative:       Infectious disease consultation is highly  "recommended to rule out distant foci of infection.                      Radiology:   Imaging Results (Last 72 Hours)         ** No results found for the last 72 hours. **                           Active Hospital Problems     Diagnosis      **Sepsis      Diabetic foot infection      Chronic kidney disease (CKD), stage IV (severe)      Chronic diastolic heart failure      BPH without obstruction/lower urinary tract symptoms      Diabetic polyneuropathy associated with type 2 diabetes mellitus      Anemia due to chronic kidney disease      Essential hypertension      Type 2 diabetes mellitus with hyperglycemia, with long-term current use of insulin           IMPRESSION:  MRSA bacteremia-blood cultures + March 26 and March 29.  Secondary to infected left foot with wounds and associated cellulitis on admission.  Blood culture March 30 remains negative to date.  And blood culture April 1 no growth at 24 hours  Chronic kidney disease stage IIIb-with acute kidney injury-Bumex discontinued.  Creatinine continues to improve.  Type 2 diabetes mellitus-poorly controlled with hemoglobin A1c of 10.7 this admission-  History of GI bleed-reviewed this with patient's wife.  Patient on Carafate and famotidine.  Per patient's wife he was on Protonix at 1 point but was switched to famotidine at recent rehab facility.  Hemoglobin remained stable.  Abdominal discomfort.  Patient denies nausea or vomiting or diarrhea.  He is eating well.  May be related to linezolid is nonspecific abdominal pain has been reported with linezolid.  MRSA nasal screen positive        RECOMMENDATION:   Continue linezolid-if patient continues to have abdominal pain, could consider switch to vancomycin however given chronic kidney disease and reaction listed as \"itching\" would like to avoid vancomycin.  Will need to monitor CBC closely given linezolid's risk of bone marrow suppression.  Wound care per Dr. Cerrato's orders  Continue to monitor surveillance blood " cultures from  March 30 and April 1.  Follow-up on 2D echo-no report available currently.  Will place order for repeat photos with next dressing change.  Glucose management per attending     Currently plan for at least 2 weeks of linezolid with start date after clearance of blood cultures-currently March 30.  Should be able to transition to oral linezolid given increased bioavailability.  Duration may change based on echocardiogram findings.              Julia Adrian MD  04/02/24  08:49 CDT                    Latanya Paez MD   Physician  Hospitalist     Progress Notes     Incomplete     Date of Service: 04/02/24 0847  Creation Time: 04/02/24 0847     Incomplete              [facility] Medicine Services  INPATIENT PROGRESS NOTE     Patient Name: Erick Luong  Date of Admission: 3/26/2024  Today's Date: 04/02/24  Length of Stay: 7  Primary Care Physician: Del Shetty MD     Subjective   Chief Complaint: Altered mental status  Erick Luong is a 68-year-old male with a past medical history of coronary artery disease, diastolic dysfunction followed by Dr. Garay, vascular disease, GERD, diabetes, chronic anemia, chronic nonhealing wound to the left foot followed by [facility] wound care.  Patient had an extended hospitalization 8/2023 due to syncope, subsequent admission to [facility] 9/18 - 10/11, 2023.  During that admission he did undergo debridement of Proteus wound infection.     Patient seen by PCP on 3/11/2024, started on prazosin 1 mg daily, duloxetine 60 mg daily magnesium hydroxide 400 mg daily per office note.  Wife is unaware of exactly what patient is taking.  Will need pharmacy to obtain correct med list.     Wife states they were seen by Dylan Pearson, podiatry yesterday who performed debridement on the plantar wound treating with Santyl, is also a area on dorsal aspect of the foot.  Foot is erythemic, warm to touch.  Doctor told patient and wife he was  doing well.  There was no signs of infection yesterday.  Today patient was brought to emergency department via EMS for altered mental status with disorientation, glucose was noted to be over 500 in the ambulance.  Initial glucose here 400 followed with 438 treated with regular insulin.  I have taken care of this patient many times.  He awakens for questions.  He is somewhat somnolent.  Remainder workup reveals creatinine 1.9 at baseline, CRP 4.3, lactic acid 3.2 and procalcitonin 6.56.  He does have a white count of 14.6, no bandemia.  Initially afebrile however now 100.7, patient meets criteria for sepsis with tachycardia and tachypnea, elevated lactic acid and source of infection.  There is no organ failure.  Patient is admitted for simple sepsis, condition stabl  3/31   Patient is alert and oriented x 3.  He appears to be in good spirits.  He is requesting for discharge.  He was counseled that we will need to wait until later in this week, after we set up IV antibiotics before we can discharge him.    4/1  Patient was admitted to ER on 3/26 with altered mental status. Had debridement of ongoing wound on left foot the day prior. Left foot warm and erythremic on arrival to ER. Noted to have elevated blood glucose over 400. Initial creatinine of 1.9.  Appreciate nephrology were consulted for acute on chronic renal failure MRI of the left foot did not show any osteomyelitis or abscess.  Appreciate ID patient was found to have MRSA bacteremia-blood cultures + March 26 and March 29.  Secondary to infected left foot with wounds and associated cellulitis on admission patient was started on Zyvox plan is for echo today rule out endocarditis given persistent bacteremia holding off on PICC line pending that   4/2  As before history of coronary disease diabetes chronic diabetic foot ulcer noncompliance with A1c 10.4 presented to us with mental status change was found to have MRSA sepsis bacteremia secondary to his infected  foot MRI did not show any osteo or abscess has been on Zyvox echo was ordered to rule out endocarditis for persistent bacteremia, podiatry recommends wound care and follow-up with outpatient wound care appreciate ID Currently plan for at least 2 weeks of linezolid with start date after clearance of blood cultures-currently March 30. Should be able to transition to oral linezolid given increased bioavailability. Duration may change based on echocardiogram findings , appreciate nephrology acute on chronic renal failure secondary to ATN resolving  Review of Systems   All pertinent negatives and positives are as above. All other systems have been reviewed and are negative unless otherwise stated.      Objective    Temp:  [96.8 °F (36 °C)-97.7 °F (36.5 °C)] 96.8 °F (36 °C)  Heart Rate:  [60-67] 60  Resp:  [16] 16  BP: (122-136)/(46-73) 126/68  Physical Exam  Constitutional:       General: He is not in acute distress.     Appearance: He is well-developed. He is not diaphoretic.   HENT:      Head: Normocephalic.   Eyes:      General: No scleral icterus.     Conjunctiva/sclera: Conjunctivae normal.      Pupils: Pupils are equal, round, and reactive to light.   Neck:      Thyroid: No thyromegaly.      Vascular: No JVD.      Trachea: No tracheal deviation.   Cardiovascular:      Rate and Rhythm: Normal rate and regular rhythm.      Heart sounds: Normal heart sounds. No murmur heard.     No friction rub. No gallop.   Pulmonary:      Effort: Pulmonary effort is normal. No respiratory distress.      Breath sounds: Normal breath sounds. No stridor. No wheezing or rales.   Chest:      Chest wall: No tenderness.   Abdominal:      General: Bowel sounds are normal. There is no distension.      Palpations: Abdomen is soft. There is no mass.      Tenderness: There is no abdominal tenderness. There is no guarding or rebound.      Hernia: No hernia is present.   Musculoskeletal:         General: Swelling and signs of injury present. No  tenderness or deformity. Normal range of motion.      Cervical back: Normal range of motion and neck supple.      Right lower leg: No edema.      Comments: Dressing over left foot noted.   Lymphadenopathy:      Cervical: No cervical adenopathy.   Skin:     General: Skin is warm and dry.      Coloration: Skin is not pale.      Findings: No erythema or rash.   Neurological:      General: No focal deficit present.      Mental Status: He is alert and oriented to person, place, and time.      Cranial Nerves: No cranial nerve deficit.      Sensory: No sensory deficit.      Motor: No abnormal muscle tone.      Coordination: Coordination normal.   Psychiatric:         Mood and Affect: Mood normal.         Behavior: Behavior normal.              File Link     Scan on 3/26/2024 1423 by Ro Vinson RN: Wound 06/15/23 0102 Left anterior plantar        Key Information     Document ID File Type Document Type Description   [id number] Image WOUND IMAGE Wound 06/15/23 0102 Left anterior plantar      Import Information     Attached At Date Time User Dept   Encounter Level 3/26/2024  2:23 PM Ro Vinson RN [facility] Emergency Dept      Encounter     Hospital Encounter on 3/26/24      Results Review:  I have reviewed the labs, radiology results, and diagnostic studies.     Laboratory Data:          Results from last 7 days   Lab Units 04/02/24  0617 04/01/24  0402 03/31/24  0526   WBC 10*3/mm3 7.59 5.15 5.01   HEMOGLOBIN g/dL 10.8* 9.9* 10.3*   HEMATOCRIT % 34.0* 30.6* 31.6*   PLATELETS 10*3/mm3 197 162 149                  Results from last 7 days   Lab Units 04/02/24  0656 04/01/24  0402 03/31/24  0526 03/30/24  0620 03/29/24  0551   SODIUM mmol/L 139 135* 136 135* 138   POTASSIUM mmol/L 4.1 4.1 3.6 3.5 3.3*   CHLORIDE mmol/L 104 101 101 100 103   CO2 mmol/L 26.0 23.0 23.0 24.0 23.0   BUN mg/dL 43* 46* 51* 51* 40*   CREATININE mg/dL 2.02* 2.18* 2.52* 2.74* 2.07*   CALCIUM mg/dL 9.8 9.1 8.6 8.2* 8.4*   BILIRUBIN mg/dL  --    --  0.5 0.5 0.7   ALK PHOS U/L  --   --  110 95 81   ALT (SGPT) U/L  --   --  19 19 17   AST (SGOT) U/L  --   --  30 32 32   GLUCOSE mg/dL 106* 292* 201* 104* 120*         Culture Data:         Blood Culture   Date Value Ref Range Status   03/26/2024 Abnormal Stain (C)   Preliminary   03/26/2024 Abnormal Stain (C)   Preliminary         Radiology Data:   Imaging Results (Last 24 Hours)         ** No results found for the last 24 hours. **                I have reviewed the patient's current medications.      Assessment/Plan   Assessment       Active Hospital Problems     Diagnosis      **Sepsis      Diabetic foot infection      Chronic kidney disease (CKD), stage IV (severe)      Chronic diastolic heart failure      BPH without obstruction/lower urinary tract symptoms      Diabetic polyneuropathy associated with type 2 diabetes mellitus      Anemia due to chronic kidney disease      Essential hypertension      Type 2 diabetes mellitus with hyperglycemia, with long-term current use of insulin     MRSA bacteremia  Metabolic encephalopathy secondary to sepsis  WANDER on CKD     Treatment Plan  Continue IV antibiotics with Zyvox. Infectious disease input appreciated.  Mental status appears back to baseline now that sepsis is better controlled.  However he has persistently positive blood cultures.  Plan for 2D echo on Monday.  Will hold off on inserting PICC line for now until blood cultures are clear.     Podiatry consultation input appreciated.  Will follow recommendations.  Continue wound dressings as per podiatry Follow-up repeat blood cultures.     Nephrology and urology input appreciated.  Hold Bumex due to elevated creatinine and BUN.  No signs of urinary retention at this time.     Pain control with opiate pain medications.     Insulin sliding scale and Levemir for type 2 diabetes mellitus     DVT prophylaxis with Eliquis     PT/OT/wound care consultations     DVT prophylaxis with Eliquis     CODE STATUS is full  code     Medical Decision Making  Number and Complexity of problems: 4 acute, severe, high complexity medical problems.  Multiple chronic medical problems.  Differential Diagnosis: None     Conditions and Status        Condition is worsening.     [facility] Data  External documents reviewed: None  Cardiac tracing (EKG, telemetry) interpretation: None  Radiology interpretation: None  Labs reviewed: CBC, BMP, blood cultures reviewed by me  Any tests that were considered but not ordered: None     Decision rules/scores evaluated (example [id number], Wells, etc): N/A     Discussed with: Patient     Care Planning  Shared decision making: Patient and his wife  Code status and discussions: CODE STATUS is full code     Disposition  Social Determinants of Health that impact treatment or disposition: None  I expect the patient to be discharged to home in 3 to 5 days.      Electronically signed by Latanya Paez MD, 04/02/24, 08:47 CDT.                   Time Temp Pulse Resp BP Patient Position Device (Oxygen Therapy) SpO2   04/02/24 0700 96.8 (36) 60 16 126/68 Lying room air 100   04/02/24 0300 97.4 (36.3) 62 16 132/69 Sitting room air 100   04/02/24 0016 97.4 (36.3) 63 16 122/46 Lying room air 97   04/01/24 2006 97.7 (36.5) 63 16 136/60 Sitting room air 100   04/01/24 1554 97.6 (36.4) 67 16 134/63 Lying room air 99   04/01/24 1152 97.5 (36.4) 65 16 123/73 Sitting room air 99   04/01                Encounter Date    3/26/24    MRI Foot Left With & Without Contrast [[id number]] (Order [id number])  Order  Status: Final result     Patient Location    Patient Class Location   Inpatient  PAD 3C, 372, 1     [phone]     Study Notes     Claire Gibson on 3/29/2024  6:14 PM CDT   Foot swelling, diabetic, osteomyelitis suspected, xray done. Fore foot protocol     Appointment Information    PACS Images     Radiology Images  Study Result    Narrative & Impression   EXAMINATION: MRI FOOT LEFT W WO CONTRAST-     3/29/2024 4:30 PM     HISTORY:  Foot swelling, diabetic, osteomyelitis suspected, xray done;  E11.628-Type 2 diabetes mellitus with other skin complications;  L08.9-Local infection of the skin and subcutaneous tissue, unspecified;  E11.65-Type 2 diabetes mellitus with hyperglycemia; Z74.09-Other reduced  mobility     LEFT foot MRI without and with IV gadolinium contrast.  Axial, sagittal, and coronal sequences.     COMPARISON:  LEFT foot x-rays from 3/26/2024.     There is a tiny metal foreign body within the plantar soft tissues  adjacent to the second toe and despite the small size it produces a  prominent amount of artifact related to image distortion.     I see no soft tissue abscess.     No discrete bone edema or bone enhancement is seen to indicate  osteomyelitis.     IMPRESSION:  1. There are some limitations to the study based on metal related  artifact.  2. No soft tissue abscess or definite bone marrow edema or enhancement  is seen to indicate osteomyelitis.           This report was signed and finalized on 3/29/2024 6:58 PM by Dr. Gomez Mcgill MD.        6 High  235 High  R,  High  R,  High  R,  High  R,  High  284 High  R, CM   BUN  8 - 23 mg/dL 43 High      46 High    Creatinine  0.76 - 1.27 mg/dL 2.02 High      2.18 High    Sodium  136 - 145 mmol/L 139     135 Low    Potassium  3.5 - 5.2 mmol/L 4.1     4.1   Chloride  98 - 107 mmol/L 104     101   CO2  22.0 - 29.0 mmol/L 26.0     23.0   Calcium  8.6 - 10.5 mg/dL 9.8     9.1   BUN/Creatinine Ratio  7.0 - 25.0 21.3     21.1   Anion Gap  5.0 - 15.0 mmol/L 9.0     11.0   eGFR  >60.0 mL/min/1.73 35.5 Low             to patient: No (scheduled for 4/2/2024  8:01 AM)    Specimen Information: Blood   0 Result Notes            Component  Ref Range & Units 06:17 1 d ago 2 d ago 3 d ago 4 d ago 5 d ago 6 d ago   WBC  3.40 - 10.80 10*3/mm3 7.59 5.15 5.01 5.69 6.08 9.45 12.75 High    RBC  4.14 - 5.80 10*6/mm3 3.88 Low  3.50 Low  3.64 Low  3.58 Low  3.90 Low  4.79 4.12 Low     Hemoglobin  13.0 - 17.7 g/dL 10.8 Low  9.9 Low  10.3 Low  10.0 Low  11.1 Low  13.7 11.8 Low    Hematocrit  37.5 - 51.0 % 34.0 Low  30.6 Low  31.6 Low  30.6 Low  33.6 Low  41.1 35.9 Low    MCV  79.0 - 97.0 fL 87.6                            Current Facility-Administered Medications   Medication Dose Route Frequency Provider Last Rate Last Admin    acetaminophen (TYLENOL) tablet 650 mg  650 mg Oral Q4H PRN Raquel Duncan APRN   650 mg at 04/01/24 0446    Or    acetaminophen (TYLENOL) 160 MG/5ML oral solution 650 mg  650 mg Oral Q4H PRN Raquel Duncan APRN   650 mg at 03/27/24 2109    Or    acetaminophen (TYLENOL) suppository 650 mg  650 mg Rectal Q4H PRN Raquel Duncan APRN        apixaban (ELIQUIS) tablet 5 mg  5 mg Oral Q12H Raquel Duncan APRN   5 mg at 04/02/24 0943    ascorbic acid (VITAMIN C) tablet 1,000 mg  1,000 mg Oral Daily Rene Maloney MD   1,000 mg at 04/02/24 0944    aspirin EC tablet 81 mg  81 mg Oral Daily Rene Maloney MD   81 mg at 04/02/24 0946    sennosides-docusate (PERICOLACE) 8.6-50 MG per tablet 1 tablet  1 tablet Oral BID Raquel Duncan APRN   1 tablet at 04/02/24 0942    And    polyethylene glycol (MIRALAX) packet 17 g  17 g Oral Daily PRN Raquel Duncan APRN        And    bisacodyl (DULCOLAX) EC tablet 5 mg  5 mg Oral Daily PRN Raquel Duncan APRN        And    bisacodyl (DULCOLAX) suppository 10 mg  10 mg Rectal Daily PRN Raquel Duncan APRN        calcitriol (ROCALTROL) capsule 0.5 mcg  0.5 mcg Oral Daily Rene Maloney MD   0.5 mcg at 04/02/24 0945    carvedilol (COREG) tablet 3.125 mg  3.125 mg Oral BID With Meals Rene Maloney MD   3.125 mg at 04/02/24 0943    dextrose (D50W) (25 g/50 mL) IV injection 25 g  25 g Intravenous Q15 Min PRN Raquel Duncan APRN        dextrose (GLUTOSE) oral gel 15 g  15 g Oral Q15 Min PRN Raquel Duncan APRN   15 g at 03/27/24 1710    Diclofenac Sodium (VOLTAREN) 1 % gel 2 g  2 g Topical 4x  Daily PRN Rene Maloney MD        dicyclomine (BENTYL) capsule 20 mg  20 mg Oral TID PRN Rene Maloney MD   20 mg at 03/31/24 1603    donepezil (ARICEPT) tablet 10 mg  10 mg Oral Daily Raquel Duncan, SUSAN   10 mg at 04/02/24 0947    famotidine (PEPCID) tablet 20 mg  20 mg Oral Daily Rene Maloney MD   20 mg at 04/02/24 0945    glucagon (GLUCAGEN) injection 1 mg  1 mg Intramuscular Q15 Min PRN Raquel Duncan, APRSHERRILL        haloperidol lactate (HALDOL) injection 5 mg  5 mg Intravenous Q6H PRN Rene Maloney MD        insulin detemir (LEVEMIR) injection 30 Units  30 Units Subcutaneous Nightly Raquel Duncan, SUSAN   30 Units at 04/01/24 2015    Insulin Lispro (humaLOG) injection 4-24 Units  4-24 Units Subcutaneous 4x Daily AC & at Bedtime Raquel Duncan APRN   8 Units at 04/01/24 2015    insulin regular (humuLIN R,novoLIN R) injection 10 Units  10 Units Subcutaneous TID AC Steve De Jesus MD   10 Units at 04/02/24 0948    isosorbide mononitrate (IMDUR) 24 hr tablet 30 mg  30 mg Oral Q24H Rene Maloney MD   30 mg at 04/02/24 0944    Linezolid (ZYVOX) 600 mg 300 mL  600 mg Intravenous Q12H Julia Adrian  mL/hr at 04/01/24 2335 600 mg at 04/01/24 2335    magnesium hydroxide (MILK OF MAGNESIA) suspension 10 mL  10 mL Oral Daily PRN Rene Maloney MD   10 mL at 03/31/24 1202    melatonin tablet 6 mg  6 mg Oral Nightly Rene Maloney MD   6 mg at 04/01/24 2014    midodrine (PROAMATINE) tablet 2.5 mg  2.5 mg Oral TID AC Rene Maloney MD   2.5 mg at 04/02/24 0945    Morphine sulfate (PF) injection 2 mg  2 mg Intravenous Q3H PRN Rene Maloney MD   2 mg at 04/02/24 0316    mupirocin (BACTROBAN) 2 % nasal ointment 1 Application  1 Application Each Nare BID Nadia Polo, APRN   1 Application at 04/02/24 0947    nitroglycerin (NITROSTAT) SL tablet 0.4 mg  0.4 mg Sublingual Q5 Min PRN Raquel Duncan, APRN        ondansetron ODT (ZOFRAN-ODT)  disintegrating tablet 4 mg  4 mg Oral Q6H PRN Raquel Duncan APRN   4 mg at 04/01/24 1313    Or    ondansetron (ZOFRAN) injection 4 mg  4 mg Intravenous Q6H PRN Raquel Duncan APRN   4 mg at 03/29/24 1837    oxyCODONE (ROXICODONE) immediate release tablet 10 mg  10 mg Oral BID PRN Raquel Duncan APRN   10 mg at 04/01/24 2019    pregabalin (LYRICA) capsule 100 mg  100 mg Oral Nightly Rene Maloney MD   100 mg at 03/31/24 2023    pregabalin (LYRICA) capsule 50 mg  50 mg Oral Daily Rene Maloney MD   50 mg at 04/02/24 0942    rosuvastatin (CRESTOR) tablet 10 mg  10 mg Oral Nightly Rene Maloney MD   10 mg at 04/01/24 2014    sodium chloride 0.9 % flush 10 mL  10 mL Intravenous PRN Steve De Jesus MD        sodium chloride 0.9 % flush 10 mL  10 mL Intravenous Q12H Raquel Duncan APRN   10 mL at 04/02/24 0949    sodium chloride 0.9 % flush 10 mL  10 mL Intravenous PRN Raquel Duncan APRN        sodium chloride 0.9 % infusion 40 mL  40 mL Intravenous PRN Raquel Duncan APRN        sucralfate (CARAFATE) 1 GM/10ML suspension 1 g  1 g Oral TID AC Rene Maloney MD   1 g at 04/02/24 0941    tamsulosin (FLOMAX) 24 hr capsule 0.4 mg  0.4 mg Oral Daily Rene Maloney MD   0.4 mg at 04/02/24 0945    timolol (TIMOPTIC) 0.25 % ophthalmic solution 1 drop  1 drop Both Eyes Q12H Rene Malonye MD   1 drop at 04/01/24 2015    vitamin D3 capsule 5,000 Units  5,000 Units Oral Daily Rene Maloney MD   5,000 Units at 04/02/24 0942    zinc sulfate (ZINCATE) capsule 220 mg  220 mg Oral Daily Rene Maloney MD   220 mg at 04/02/24 0947

## 2024-04-02 NOTE — THERAPY TREATMENT NOTE
Patient Name: Erick Luong  : 1955    MRN: [id number]                              Today's Date: 2024       Admit Date: 3/26/2024    Visit Dx:     ICD-10-CM ICD-9-CM   1. Diabetic foot infection  E11.628 250.80    L08.9 686.9   2. Poorly controlled diabetes mellitus  E11.65 250.00   3. Impaired functional mobility and activity tolerance [Z74.09]  Z74.09 V49.89     Patient Active Problem List   Diagnosis    Obesity, unspecified obesity severity, unspecified obesity type    Essential hypertension    Type 2 diabetes mellitus with hyperglycemia, with long-term current use of insulin    Nonsmoker    Anemia due to chronic kidney disease    Class 3 severe obesity due to excess calories with body mass index (BMI) of 40.0 to 44.9 in adult    Anasarca    Sleep apnea with use of continuous positive airway pressure (CPAP)    Medically noncompliant    Diabetic ulcer of left midfoot associated with type 2 diabetes mellitus, with fat layer exposed    Diabetic polyneuropathy associated with type 2 diabetes mellitus    Spinal stenosis in cervical region    Degeneration of cervical intervertebral disc    Cervical radiculopathy    Degeneration of lumbar or lumbosacral intervertebral disc    Cervical myelopathy    Bilateral carpal tunnel syndrome    CAD (coronary artery disease)    GERD without esophagitis    BPH without obstruction/lower urinary tract symptoms    Stage 3b chronic kidney disease    Chronic diastolic heart failure    Type 2 myocardial infarction due to heart failure    Left carpal tunnel syndrome    Syncope and collapse, recurrent episodes    Poorly-controlled hypertension    Rhinovirus    Peripheral vascular disease    Chronic kidney disease (CKD), stage IV (severe)    Diabetic foot infection    Sepsis     Past Medical History:   Diagnosis Date    Arthritis     Autonomic disease     CAD (coronary artery disease) 2017    Cervical radiculopathy 2021    Chronic constipation with acute  exaccerbation 05/10/2021    Coronary artery disease     Degeneration of cervical intervertebral disc 08/11/2021    Diabetes mellitus     Diabetic foot ulcer 08/31/2020    Diabetic polyneuropathy associated with type 2 diabetes mellitus 01/18/2021    Elevated cholesterol     Gastroesophageal reflux disease 05/13/2019    Headache     HTN (hypertension), benign 05/03/2017    Hyperlipidemia     Hypertension     Mixed hyperlipidemia 02/07/2017    Multiple lung nodules 01/26/2020    5mm, 9 mm RLL identified 1/2020, not present 10/2019.    Myocardial infarction     Osteomyelitis 01/22/2020    Osteomyelitis of fifth toe of right foot 10/07/2019    Pancreatitis     Persistent insomnia 01/20/2020    Renal disorder     Sleep apnea 02/06/2017    Sleep apnea with use of continuous positive airway pressure (CPAP)     NON-COMPLIANT    Slow transit constipation 01/16/2019    Spinal stenosis in cervical region 09/16/2021    Vitamin D deficiency 03/02/2021     Past Surgical History:   Procedure Laterality Date    ABDOMINAL SURGERY      AMPUTATION FOOT / TOE Left 10/2021    5th digit     ANTERIOR CERVICAL DISCECTOMY W/ FUSION N/A 8/5/2022    Procedure: CERVICAL DISCECTOMY ANTERIOR WITH FUSION C5-6 with possible plating of C5-7 with neuromonitoring and 1 c-arm;  Surgeon: Karel Soliz MD;  Location:  [facility] OR;  Service: Neurosurgery;  Laterality: N/A;    APPENDECTOMY      BACK SURGERY      CARDIAC CATHETERIZATION Left 02/08/2021    Procedure: Left Heart Cath w poss intervention left anatomical snuff box acess;  Surgeon: Omkar Charles DO;  Location:  [facility]ATH [facility]ATION;  Service: Cardiology;  Laterality: Left;    CARDIAC SURGERY      CATARACT EXTRACTION      CERVICAL SPINE SURGERY      COLONOSCOPY N/A 01/31/2017    Normal exam repeat in 5 years    COLONOSCOPY N/A 02/11/2019    Mild acute inflammation    COLONOSCOPY W/ POLYPECTOMY  03/04/2014    Hyperplastic polyp    CORONARY ARTERY BYPASS GRAFT  10/2015     ENDOSCOPY  04/13/2011    Gastritis with hemorrhage    ENDOSCOPY N/A 05/05/2017    Normal exam    ENDOSCOPY N/A 02/11/2019    Gastritis    ENDOSCOPY N/A 09/01/2020    Non-erosive gastritis with hemorrhage    ENDOSCOPY N/A 02/10/2021    Esophagitis    FOOT SURGERY Left     INCISION AND DRAINAGE OF WOUND Left 09/2007    spider bite      General Information       Row Name 04/02/24 1112          OT Time and Intention    Document Type therapy note (daily note)  -LS     Mode of Treatment occupational therapy  -LS       Row Name 04/02/24 1112          General Information    Patient Profile Reviewed yes  -LS     Existing Precautions/Restrictions fall  WBAT LLE in cam boot  -LS       Row Name 04/02/24 1112          Cognition    Orientation Status (Cognition) oriented x 4  -LS       Row Name 04/02/24 1112          Safety Issues, Functional Mobility    Safety Issues Affecting Function (Mobility) safety precautions follow-through/compliance;safety precaution awareness  -LS     Impairments Affecting Function (Mobility) balance;endurance/activity tolerance;pain  -LS               User Key  (r) = Recorded By, (t) = Taken By, (c) = Cosigned By      Initials Name Provider Type    LS Alesha Barrett OTR/L Occupational Therapist                     Mobility/ADL's       Row Name 04/02/24 1112          Transfers    Transfers sit-stand transfer;stand-sit transfer  -LS       Row Name 04/02/24 1112          Sit-Stand Transfer    Sit-Stand [place] (Transfers) standby assist;verbal cues  -LS       Row Name 04/02/24 1112          Stand-Sit Transfer    Stand-Sit [place] (Transfers) standby assist;verbal cues  -       Row Name 04/02/24 1112          Toileting Assessment/Training    [place] Level (Toileting) toileting skills;adjust/manage clothing;standby assist  -LS     Position (Toileting) unsupported standing  -LS               User Key  (r) = Recorded By, (t) = Taken By, (c) = Cosigned By      Initials Name Provider Type    LS  Alesha Barrett, OTR/L Occupational Therapist                   Obj/Interventions    No documentation.                  Goals/Plan    No documentation.                  Clinical Impression       Row Name 04/02/24 1112          Pain Assessment    Pretreatment Pain Rating 8/10  -LS     Posttreatment Pain Rating 8/10  -LS     Pain Location - Side/Orientation Left  -LS     Pain Location lower  -LS     Pain Location - abdomen  -LS     Pain Intervention(s) Repositioned;Nursing Notified  -LS       Row Name 04/02/24 1112          Plan of Care Review    Plan of Care Reviewed With patient  -LS     Progress improving  -LS     Outcome Evaluation OT tx completed. Pt seated in recliner upon therapist arrival; A&Ox4; c/o 8/10 pain in lower abdomen. As therapist approached Pt, Pt began to stand despite therapist cues that CAM boot had to first be donned. Pt stated that he had to quickly utilize urinal and could not do so in a seated position. Pt performed toileting task utilizing urinal in standing position with SBA. Once Pt returned to seated position, therapist provided additional education on importance of wearing CAM boot per MD orders; Pt verbalized understanding. Pt also observed to have soiled gown; Pt donned new gown in seated position with Min A to correctly orient gown. Pt requested to rest at this point due to abdominal pain. Pt continues to benefit from skilled OT intervention in order to address remaining deficits in fxl mobility, fxl activity tolerance, balance, strength, and use of adaptive techniques/equpiment during performance of BADLs. Recommend sub acute rehab at discharge.  -       Row Name 04/02/24 1112          Therapy Plan Review/Discharge Plan (OT)    Anticipated Discharge Disposition (OT) sub acute care setting  -       Row Name 04/02/24 1112          Positioning and Restraints    Pre-Treatment Position sitting in chair/recliner  -LS     Post Treatment Position chair  -LS     In Chair reclined;call light  within reach;encouraged to call for assist;exit alarm on;legs elevated  -               User Key  (r) = Recorded By, (t) = Taken By, (c) = Cosigned By      Initials Name Provider Type    Alesha Bloom OTR/L Occupational Therapist                   Outcome Measures       Row Name 04/02/24 1127          How much help from another is currently needed...    Putting on and taking off regular lower body clothing? 2  -LS     Bathing (including washing, rinsing, and drying) 2  -LS     Toileting (which includes using toilet bed pan or urinal) 3  -LS     Putting on and taking off regular upper body clothing 3  -LS     Taking care of personal grooming (such as brushing teeth) 3  -LS     Eating meals 4  -     AM-PAC 6 Clicks Score (OT) 17  -       Row Name 04/02/24 0800          How much help from another person do you currently need...    Turning from your back to your side while in flat bed without using bedrails? 4  -AH     Moving from lying on back to sitting on the side of a flat bed without bedrails? 3  -AH     Moving to and from a bed to a chair (including a wheelchair)? 3  -AH     Standing up from a chair using your arms (e.g., wheelchair, bedside chair)? 3  -AH     Climbing 3-5 steps with a railing? 2  -AH     To walk in hospital room? 3  -AH     AM-PAC 6 Clicks Score (PT) 18  -AH     Highest Level of Mobility Goal 6 --> Walk 10 steps or more  -       Row Name 04/02/24 1127 04/02/24 0800       Functional Assessment    Outcome Measure Options AM-PAC 6 Clicks Daily Activity (OT)  - AM-PAC 6 Clicks Basic Mobility (PT)  -              User Key  (r) = Recorded By, (t) = Taken By, (c) = Cosigned By      Initials Name Provider Type     Chery Rosado PTA Physical Therapist Assistant    Alesha Bloom OTR/L Occupational Therapist                    Occupational Therapy Education       Title: PT OT SLP Therapies (In Progress)       Topic: Occupational Therapy (In Progress)       Point: ADL training (Done)        Description:   Instruct learner(s) on proper safety adaptation and remediation techniques during self care or transfers.   Instruct in proper use of assistive devices.                  Learning Progress Summary             Patient Acceptance, E, VU,NR by  at 4/2/2024 1128    Acceptance, E, VU,NR by  at 3/29/2024 1424    Comment: Role of OT, OT POC, fall prevention, benefits of ambulation, d/c recommendations                         Point: Home exercise program (Not Started)       Description:   Instruct learner(s) on appropriate technique for monitoring, assisting and/or progressing therapeutic exercises/activities.                  Learner Progress:  Not documented in this visit.              Point: Precautions (Done)       Description:   Instruct learner(s) on prescribed precautions during self-care and functional transfers.                  Learning Progress Summary             Patient Acceptance, E, VU,NR by  at 4/2/2024 1128    Acceptance, E, VU,NR by  at 3/29/2024 1424    Comment: Role of OT, OT POC, fall prevention, benefits of ambulation, d/c recommendations                         Point: Body mechanics (Done)       Description:   Instruct learner(s) on proper positioning and spine alignment during self-care, functional mobility activities and/or exercises.                  Learning Progress Summary             Patient Acceptance, E, VU,NR by  at 4/2/2024 1128    Acceptance, E, VU,NR by  at 3/29/2024 1424    Comment: Role of OT, OT POC, fall prevention, benefits of ambulation, d/c recommendations                                         User Key       Initials Effective Dates Name Provider Type Discipline     06/20/22 -  Alesha Barrett OTR/L Occupational Therapist OT     01/09/24 -  Yulia Pritchett OT Student OT Student OT                  OT Recommendation and Plan     Plan of Care Review  Plan of Care Reviewed With: patient  Progress: improving  Outcome Evaluation: OT tx completed. Pt  seated in recliner upon therapist arrival; A&Ox4; c/o 8/10 pain in lower abdomen. As therapist approached Pt, Pt began to stand despite therapist cues that CAM boot had to first be donned. Pt stated that he had to quickly utilize urinal and could not do so in a seated position. Pt performed toileting task utilizing urinal in standing position with SBA. Once Pt returned to seated position, therapist provided additional education on importance of wearing CAM boot per MD orders; Pt verbalized understanding. Pt also observed to have soiled gown; Pt donned new gown in seated position with Min A to correctly orient gown. Pt requested to rest at this point due to abdominal pain. Pt continues to benefit from skilled OT intervention in order to address remaining deficits in fxl mobility, fxl activity tolerance, balance, strength, and use of adaptive techniques/equpiment during performance of BADLs. Recommend sub acute rehab at discharge.     Time Calculation:         Time Calculation- OT       Row Name 04/02/24 1112 04/02/24 0822          Time Calculation- OT    OT Start Time 1112  -LS --     OT Stop Time 1128  -LS --     OT Time Calculation (min) 16 min  -LS --     Total Timed Code Minutes- OT 16 minute(s)  -LS --     OT Received On 04/02/24  -LS --        Timed Charges    97803 - Gait Training Minutes  -- 13  -AH        Total Minutes    Timed Charges Total Minutes -- 13  -AH      Total Minutes -- 13  -AH               User Key  (r) = Recorded By, (t) = Taken By, (c) = Cosigned By      Initials Name Provider Type    Chery Cedeno PTA Physical Therapist Assistant     Alesha Barrett OTR/L Occupational Therapist                  Therapy Charges for Today       Code Description Service Date Service Provider Modifiers Qty    45701697158 HC OT SELF CARE/MGMT/TRAIN EA 15 MIN 4/1/2024 Alesha Barrett OTR/L GO 2    [id number] HC OT SELF CARE/MGMT/TRAIN EA 15 MIN 4/2/2024 Alesha Barrett OTR/L GO 1                 Alesha  Arnold, OTR/L  4/2/2024

## 2024-04-03 ENCOUNTER — APPOINTMENT (OUTPATIENT)
Dept: GENERAL RADIOLOGY | Facility: HOSPITAL | Age: 69
DRG: 871 | End: 2024-04-03
Payer: COMMERCIAL

## 2024-04-03 LAB
ANION GAP SERPL CALCULATED.3IONS-SCNC: 10 MMOL/L (ref 5–15)
BASOPHILS # BLD AUTO: 0.03 10*3/MM3 (ref 0–0.2)
BASOPHILS NFR BLD AUTO: 0.5 % (ref 0–1.5)
BUN SERPL-MCNC: 37 MG/DL (ref 8–23)
BUN/CREAT SERPL: 21 (ref 7–25)
CALCIUM SPEC-SCNC: 9.3 MG/DL (ref 8.6–10.5)
CHLORIDE SERPL-SCNC: 104 MMOL/L (ref 98–107)
CO2 SERPL-SCNC: 25 MMOL/L (ref 22–29)
CREAT SERPL-MCNC: 1.76 MG/DL (ref 0.76–1.27)
DEPRECATED RDW RBC AUTO: 46.2 FL (ref 37–54)
EGFRCR SERPLBLD CKD-EPI 2021: 41.9 ML/MIN/1.73
EOSINOPHIL # BLD AUTO: 0.29 10*3/MM3 (ref 0–0.4)
EOSINOPHIL NFR BLD AUTO: 4.4 % (ref 0.3–6.2)
ERYTHROCYTE [DISTWIDTH] IN BLOOD BY AUTOMATED COUNT: 14.2 % (ref 12.3–15.4)
GLUCOSE BLDC GLUCOMTR-MCNC: 122 MG/DL (ref 70–130)
GLUCOSE BLDC GLUCOMTR-MCNC: 128 MG/DL (ref 70–130)
GLUCOSE BLDC GLUCOMTR-MCNC: 93 MG/DL (ref 70–130)
GLUCOSE BLDC GLUCOMTR-MCNC: 94 MG/DL (ref 70–130)
GLUCOSE BLDC GLUCOMTR-MCNC: 98 MG/DL (ref 70–130)
GLUCOSE SERPL-MCNC: 118 MG/DL (ref 65–99)
HCT VFR BLD AUTO: 28.9 % (ref 37.5–51)
HGB BLD-MCNC: 9 G/DL (ref 13–17.7)
IMM GRANULOCYTES # BLD AUTO: 0.04 10*3/MM3 (ref 0–0.05)
IMM GRANULOCYTES NFR BLD AUTO: 0.6 % (ref 0–0.5)
LYMPHOCYTES # BLD AUTO: 1.93 10*3/MM3 (ref 0.7–3.1)
LYMPHOCYTES NFR BLD AUTO: 29.5 % (ref 19.6–45.3)
MCH RBC QN AUTO: 27.7 PG (ref 26.6–33)
MCHC RBC AUTO-ENTMCNC: 31.1 G/DL (ref 31.5–35.7)
MCV RBC AUTO: 88.9 FL (ref 79–97)
MONOCYTES # BLD AUTO: 0.61 10*3/MM3 (ref 0.1–0.9)
MONOCYTES NFR BLD AUTO: 9.3 % (ref 5–12)
NEUTROPHILS NFR BLD AUTO: 3.64 10*3/MM3 (ref 1.7–7)
NEUTROPHILS NFR BLD AUTO: 55.7 % (ref 42.7–76)
NRBC BLD AUTO-RTO: 0 /100 WBC (ref 0–0.2)
PLATELET # BLD AUTO: 197 10*3/MM3 (ref 140–450)
PMV BLD AUTO: 11.5 FL (ref 6–12)
POTASSIUM SERPL-SCNC: 4.2 MMOL/L (ref 3.5–5.2)
RBC # BLD AUTO: 3.25 10*6/MM3 (ref 4.14–5.8)
SODIUM SERPL-SCNC: 139 MMOL/L (ref 136–145)
WBC NRBC COR # BLD AUTO: 6.54 10*3/MM3 (ref 3.4–10.8)

## 2024-04-03 PROCEDURE — 63710000001 INSULIN REGULAR HUMAN PER 5 UNITS: Performed by: EMERGENCY MEDICINE

## 2024-04-03 PROCEDURE — 99232 SBSQ HOSP IP/OBS MODERATE 35: CPT | Performed by: INTERNAL MEDICINE

## 2024-04-03 PROCEDURE — 25010000002 HALOPERIDOL LACTATE PER 5 MG: Performed by: INTERNAL MEDICINE

## 2024-04-03 PROCEDURE — 74018 RADEX ABDOMEN 1 VIEW: CPT

## 2024-04-03 PROCEDURE — 25010000002 MORPHINE PER 10 MG: Performed by: INTERNAL MEDICINE

## 2024-04-03 PROCEDURE — 25010000002 LINEZOLID 600 MG/300ML SOLUTION: Performed by: INTERNAL MEDICINE

## 2024-04-03 PROCEDURE — 97535 SELF CARE MNGMENT TRAINING: CPT

## 2024-04-03 PROCEDURE — 80048 BASIC METABOLIC PNL TOTAL CA: CPT | Performed by: INTERNAL MEDICINE

## 2024-04-03 PROCEDURE — 82948 REAGENT STRIP/BLOOD GLUCOSE: CPT

## 2024-04-03 PROCEDURE — 97116 GAIT TRAINING THERAPY: CPT

## 2024-04-03 PROCEDURE — 63710000001 ONDANSETRON ODT 4 MG TABLET DISPERSIBLE: Performed by: NURSE PRACTITIONER

## 2024-04-03 PROCEDURE — 97110 THERAPEUTIC EXERCISES: CPT

## 2024-04-03 PROCEDURE — 85025 COMPLETE CBC W/AUTO DIFF WBC: CPT | Performed by: INTERNAL MEDICINE

## 2024-04-03 RX ORDER — LACTULOSE 20 G/30ML
20 SOLUTION ORAL 3 TIMES DAILY
Status: DISCONTINUED | OUTPATIENT
Start: 2024-04-03 | End: 2024-04-11 | Stop reason: HOSPADM

## 2024-04-03 RX ORDER — BUMETANIDE 1 MG/1
1 TABLET ORAL DAILY
Status: DISCONTINUED | OUTPATIENT
Start: 2024-04-03 | End: 2024-04-08

## 2024-04-03 RX ADMIN — DOCUSATE SODIUM AND SENNOSIDES 1 TABLET: 8.6; 5 TABLET, FILM COATED ORAL at 19:52

## 2024-04-03 RX ADMIN — LACTULOSE 20 G: 20 SOLUTION ORAL at 19:52

## 2024-04-03 RX ADMIN — MIDODRINE HYDROCHLORIDE 2.5 MG: 2.5 TABLET ORAL at 11:12

## 2024-04-03 RX ADMIN — MIDODRINE HYDROCHLORIDE 2.5 MG: 2.5 TABLET ORAL at 08:01

## 2024-04-03 RX ADMIN — MORPHINE SULFATE 2 MG: 2 INJECTION, SOLUTION INTRAMUSCULAR; INTRAVENOUS at 11:17

## 2024-04-03 RX ADMIN — ISOSORBIDE MONONITRATE 30 MG: 30 TABLET, EXTENDED RELEASE ORAL at 08:03

## 2024-04-03 RX ADMIN — BUMETANIDE 1 MG: 1 TABLET ORAL at 11:40

## 2024-04-03 RX ADMIN — SUCRALFATE 1 G: 1 SUSPENSION ORAL at 11:12

## 2024-04-03 RX ADMIN — INSULIN HUMAN 10 UNITS: 100 INJECTION, SOLUTION PARENTERAL at 17:09

## 2024-04-03 RX ADMIN — TAMSULOSIN HYDROCHLORIDE 0.4 MG: 0.4 CAPSULE ORAL at 08:11

## 2024-04-03 RX ADMIN — Medication 1 APPLICATION: at 08:03

## 2024-04-03 RX ADMIN — APIXABAN 5 MG: 5 TABLET, FILM COATED ORAL at 08:01

## 2024-04-03 RX ADMIN — POLYETHYLENE GLYCOL 3350 17 G: 17 POWDER, FOR SOLUTION ORAL at 15:23

## 2024-04-03 RX ADMIN — LACTULOSE 20 G: 20 SOLUTION ORAL at 13:23

## 2024-04-03 RX ADMIN — LINEZOLID 600 MG: 600 INJECTION, SOLUTION INTRAVENOUS at 11:17

## 2024-04-03 RX ADMIN — ACETAMINOPHEN 650 MG: 325 TABLET, FILM COATED ORAL at 05:18

## 2024-04-03 RX ADMIN — ROSUVASTATIN CALCIUM 10 MG: 10 TABLET, FILM COATED ORAL at 19:52

## 2024-04-03 RX ADMIN — MIDODRINE HYDROCHLORIDE 2.5 MG: 2.5 TABLET ORAL at 17:09

## 2024-04-03 RX ADMIN — TIMOLOL MALEATE 1 DROP: 2.5 SOLUTION/ DROPS OPHTHALMIC at 08:12

## 2024-04-03 RX ADMIN — DOCUSATE SODIUM AND SENNOSIDES 1 TABLET: 8.6; 5 TABLET, FILM COATED ORAL at 08:01

## 2024-04-03 RX ADMIN — PREGABALIN 100 MG: 100 CAPSULE ORAL at 20:53

## 2024-04-03 RX ADMIN — OXYCODONE HYDROCHLORIDE 10 MG: 5 TABLET ORAL at 20:53

## 2024-04-03 RX ADMIN — INSULIN HUMAN 10 UNITS: 100 INJECTION, SOLUTION PARENTERAL at 08:11

## 2024-04-03 RX ADMIN — INSULIN HUMAN 10 UNITS: 100 INJECTION, SOLUTION PARENTERAL at 11:16

## 2024-04-03 RX ADMIN — Medication 6 MG: at 20:53

## 2024-04-03 RX ADMIN — Medication 1 APPLICATION: at 21:08

## 2024-04-03 RX ADMIN — SUCRALFATE 1 G: 1 SUSPENSION ORAL at 17:10

## 2024-04-03 RX ADMIN — APIXABAN 5 MG: 5 TABLET, FILM COATED ORAL at 19:52

## 2024-04-03 RX ADMIN — Medication 5000 UNITS: at 08:01

## 2024-04-03 RX ADMIN — FAMOTIDINE 20 MG: 20 TABLET, FILM COATED ORAL at 08:11

## 2024-04-03 RX ADMIN — TIMOLOL MALEATE 1 DROP: 2.5 SOLUTION/ DROPS OPHTHALMIC at 20:56

## 2024-04-03 RX ADMIN — PREGABALIN 50 MG: 50 CAPSULE ORAL at 08:01

## 2024-04-03 RX ADMIN — CARVEDILOL 3.12 MG: 3.12 TABLET, FILM COATED ORAL at 17:09

## 2024-04-03 RX ADMIN — HALOPERIDOL LACTATE 5 MG: 5 INJECTION, SOLUTION INTRAMUSCULAR at 00:26

## 2024-04-03 RX ADMIN — CARVEDILOL 3.12 MG: 3.12 TABLET, FILM COATED ORAL at 08:01

## 2024-04-03 RX ADMIN — OXYCODONE HYDROCHLORIDE AND ACETAMINOPHEN 1000 MG: 500 TABLET ORAL at 08:00

## 2024-04-03 RX ADMIN — SUCRALFATE 1 G: 1 SUSPENSION ORAL at 08:01

## 2024-04-03 RX ADMIN — ZINC SULFATE 220 MG (50 MG) CAPSULE 220 MG: CAPSULE at 08:01

## 2024-04-03 RX ADMIN — ONDANSETRON 4 MG: 4 TABLET, ORALLY DISINTEGRATING ORAL at 05:37

## 2024-04-03 RX ADMIN — CALCITRIOL 0.5 MCG: 0.25 CAPSULE ORAL at 08:00

## 2024-04-03 RX ADMIN — DONEPEZIL HYDROCHLORIDE 10 MG: 10 TABLET, FILM COATED ORAL at 08:02

## 2024-04-03 RX ADMIN — Medication 10 ML: at 08:03

## 2024-04-03 RX ADMIN — ASPIRIN 81 MG: 81 TABLET, COATED ORAL at 08:00

## 2024-04-03 RX ADMIN — MORPHINE SULFATE 2 MG: 2 INJECTION, SOLUTION INTRAMUSCULAR; INTRAVENOUS at 06:31

## 2024-04-03 RX ADMIN — MAGNESIUM HYDROXIDE 10 ML: 2400 SUSPENSION ORAL at 04:14

## 2024-04-03 NOTE — PLAN OF CARE
Goal Outcome Evaluation:      Pt has required 1 dose of Morphine IV for pain control today. KUB completed for abdominal distention and pain. Pending results. Continuing IV antibiotic- Zyvox. Wound care to left foot. Pt working with physical therapy. Voiding. Monitoring labs and blood glucose levels.

## 2024-04-03 NOTE — PAYOR COMM NOTE
"Erick Luong (68 y.o. Male)    [id number]   cont stay   please review clinical for additional days  [facility] phone    fax                  Date of Birth   1955    Social Security Number       Address   [address] [place] [address]    Home Phone   [phone]    MRN   [id number]       Evangelical   Episcopal    Marital Status                               Admission Date   3/26/24    Admission Type   Emergency    Admitting Provider   Latanya Paez MD    Attending Provider   Latanya Paez MD    Department, Room/Bed   [facility] 3C, 372/1       Discharge Date       Discharge Disposition       Discharge Destination                                 Attending Provider: Latanya Paez MD    Allergies: Cefepime, Bactrim [Sulfamethoxazole-trimethoprim], Vancomycin, Zolpidem, Zolpidem Tartrate, Metronidazole    Isolation: Contact   Infection: MRSA (05/19/19), COVID (History) (08/08/22)   Code Status: CPR    Ht: 182.9 cm (72\")   Wt: 133 kg (292 lb 8.1 oz)    Admission Cmt: None   Principal Problem: Sepsis [A41.9]                   Active Insurance as of 3/26/2024       Primary Coverage       Payor Plan Insurance Group Employer/Plan Group    [employer] [facility] [employer] EMPLOYEE [id number]       Payor Plan Address Payor Plan Phone Number Payor Plan Fax Number Effective Dates    [address] [phone]  1/1/2022 - None Entered    [facility] [id number]         Subscriber Name Subscriber Birth Date Member ID       ZAINAB LUONG 11/27/1969 [id number]               Secondary Coverage       Payor Plan Insurance Group Employer/Plan Group    MEDICARE MEDICARE A & B        Payor Plan Address Payor Plan Phone Number Payor Plan Fax Number Effective Dates    [address] [phone]  7/1/2013 - None Entered    [facility] [id number]         Subscriber Name Subscriber Birth Date Member ID       ERICK LUONG 1955 [id number]               "       Emergency Contacts        (Rel.) Home Phone Work Phone Mobile Phone    Joan Luong (Spouse) [phone]              Vital Signs (last day)       Date/Time Temp Temp src Pulse Resp BP Patient Position SpO2    04/03/24 1154 97.4 (36.3) Oral 60 18 140/68 Lying 95    04/03/24 0805 97.5 (36.4) Oral 58 18 146/68 Sitting 98    04/03/24 0446 97.8 (36.6) Oral 61 18 128/68 Lying 98    04/02/24 2232 97.7 (36.5) Oral 63 18 132/72 Lying 100    04/02/24 1957 97.8 (36.6) Oral 56 18 122/58 Lying 99    04/02/24 1554 97.2 (36.2) Oral 67 18 157/78 Sitting 97    04/02/24 1206 96.7 (35.9) Oral 64 16 138/64 Sitting 100    04/02/24 1030 -- -- -- -- 126/68 -- --    04/02/24 0700 96.8 (36) Oral 60 16 126/68 Lying 100    04/02/24 0300 97.4 (36.3) Oral 62 16 132/69 Sitting 100    04/02/24 0016 97.4 (36.3) Oral 63 16 122/46 Lying 97          Current Facility-Administered Medications   Medication Dose Route Frequency Provider Last Rate Last Admin    acetaminophen (TYLENOL) tablet 650 mg  650 mg Oral Q4H PRN Raquel Duncan APRN   650 mg at 04/03/24 0518    Or    acetaminophen (TYLENOL) 160 MG/5ML oral solution 650 mg  650 mg Oral Q4H PRN Raquel Duncan APRN   650 mg at 03/27/24 2109    Or    acetaminophen (TYLENOL) suppository 650 mg  650 mg Rectal Q4H PRN Raquel Duncan APRN        apixaban (ELIQUIS) tablet 5 mg  5 mg Oral Q12H Raquel Duncan, APRN   5 mg at 04/03/24 0801    ascorbic acid (VITAMIN C) tablet 1,000 mg  1,000 mg Oral Daily Rene Maloney MD   1,000 mg at 04/03/24 0800    aspirin EC tablet 81 mg  81 mg Oral Daily Rene Maloney MD   81 mg at 04/03/24 0800    sennosides-docusate (PERICOLACE) 8.6-50 MG per tablet 1 tablet  1 tablet Oral BID Raquel Duncan, APRN   1 tablet at 04/03/24 0801    And    polyethylene glycol (MIRALAX) packet 17 g  17 g Oral Daily PRN Raquel Duncan, APRN        And    bisacodyl (DULCOLAX) EC tablet 5 mg  5 mg Oral Daily PRN  Raquel Duncan APRN        And    bisacodyl (DULCOLAX) suppository 10 mg  10 mg Rectal Daily PRN Raquel Duncan APRN        bumetanide (BUMEX) tablet 1 mg  1 mg Oral Daily Stewart Alvarez APRN   1 mg at 04/03/24 1140    calcitriol (ROCALTROL) capsule 0.5 mcg  0.5 mcg Oral Daily Rene Maloney MD   0.5 mcg at 04/03/24 0800    carvedilol (COREG) tablet 3.125 mg  3.125 mg Oral BID With Meals Rene Maloney MD   3.125 mg at 04/03/24 0801    dextrose (D50W) (25 g/50 mL) IV injection 25 g  25 g Intravenous Q15 Min PRN Raquel Duncan APRN        dextrose (GLUTOSE) oral gel 15 g  15 g Oral Q15 Min PRN Raquel Duncan APRN   15 g at 03/27/24 1710    Diclofenac Sodium (VOLTAREN) 1 % gel 2 g  2 g Topical 4x Daily PRN Rene Maloney MD        dicyclomine (BENTYL) capsule 20 mg  20 mg Oral TID PRN Rene Maloney MD   20 mg at 04/02/24 2234    donepezil (ARICEPT) tablet 10 mg  10 mg Oral Daily Raquel Duncan APRN   10 mg at 04/03/24 0802    famotidine (PEPCID) tablet 20 mg  20 mg Oral Daily Rene Maloney MD   20 mg at 04/03/24 0811    glucagon (GLUCAGEN) injection 1 mg  1 mg Intramuscular Q15 Min PRN Raquel Duncan APRN        haloperidol lactate (HALDOL) injection 5 mg  5 mg Intravenous Q6H PRN Rene Maloney MD   5 mg at 04/03/24 0026    insulin detemir (LEVEMIR) injection 30 Units  30 Units Subcutaneous Nightly Raquel Duncan APRN   30 Units at 04/02/24 2049    Insulin Lispro (humaLOG) injection 4-24 Units  4-24 Units Subcutaneous 4x Daily AC & at Bedtime Raquel Duncan APRN   8 Units at 04/02/24 1144    insulin regular (humuLIN R,novoLIN R) injection 10 Units  10 Units Subcutaneous TID AC Steve De Jesus MD   10 Units at 04/03/24 1116    isosorbide mononitrate (IMDUR) 24 hr tablet 30 mg  30 mg Oral Q24H Rene Maloney MD   30 mg at 04/03/24 0803    lactulose solution 20 g  20 g Oral TID Latanya Paez MD        Linezolid (ZYVOX) 600 mg 300 mL  600  mg Intravenous Q12H Julia Adrian  mL/hr at 04/03/24 1117 600 mg at 04/03/24 1117    magnesium hydroxide (MILK OF MAGNESIA) suspension 10 mL  10 mL Oral Daily PRN Rene Maloney MD   10 mL at 04/03/24 0414    melatonin tablet 6 mg  6 mg Oral Nightly Rene Maloney MD   6 mg at 04/02/24 2035    midodrine (PROAMATINE) tablet 2.5 mg  2.5 mg Oral TID AC Rene Maloney MD   2.5 mg at 04/03/24 1112    Morphine sulfate (PF) injection 2 mg  2 mg Intravenous Q3H PRN Rene Maloney MD   2 mg at 04/03/24 1117    mupirocin (BACTROBAN) 2 % nasal ointment 1 Application  1 Application Each Nare BID Nadia Polo, APRN   1 Application at 04/03/24 0803    nitroglycerin (NITROSTAT) SL tablet 0.4 mg  0.4 mg Sublingual Q5 Min PRN Raquel Duncan, APRN        ondansetron ODT (ZOFRAN-ODT) disintegrating tablet 4 mg  4 mg Oral Q6H PRN Raquel Duncan, APRN   4 mg at 04/03/24 0537    Or    ondansetron (ZOFRAN) injection 4 mg  4 mg Intravenous Q6H PRN Raquel Duncan, APRN   4 mg at 03/29/24 1837    oxyCODONE (ROXICODONE) immediate release tablet 10 mg  10 mg Oral BID PRN Raquel Duncan, APRN   10 mg at 04/02/24 2036    pregabalin (LYRICA) capsule 100 mg  100 mg Oral Nightly Rene Maloney MD   100 mg at 04/02/24 2038    pregabalin (LYRICA) capsule 50 mg  50 mg Oral Daily Rene Maloney MD   50 mg at 04/03/24 0801    rosuvastatin (CRESTOR) tablet 10 mg  10 mg Oral Nightly Rene Maloney MD   10 mg at 04/02/24 2037    sodium chloride 0.9 % flush 10 mL  10 mL Intravenous PRN Steve De Jesus MD        sodium chloride 0.9 % flush 10 mL  10 mL Intravenous Q12H Raquel Duncan APRN   10 mL at 04/03/24 0803    sodium chloride 0.9 % flush 10 mL  10 mL Intravenous PRN Raquel Duncan APRN        sodium chloride 0.9 % infusion 40 mL  40 mL Intravenous PRN Raquel Duncan APRN        sucralfate (CARAFATE) 1 GM/10ML suspension 1 g  1 g Oral TID AC Rene Maloney MD   1  g at 04/03/24 1112    tamsulosin (FLOMAX) 24 hr capsule 0.4 mg  0.4 mg Oral Daily Rene Maloney MD   0.4 mg at 04/03/24 0811    timolol (TIMOPTIC) 0.25 % ophthalmic solution 1 drop  1 drop Both Eyes Q12H Rene Maloney MD   1 drop at 04/03/24 0812    vitamin D3 capsule 5,000 Units  5,000 Units Oral Daily Rene Maloney MD   5,000 Units at 04/03/24 0801    zinc sulfate (ZINCATE) capsule 220 mg  220 mg Oral Daily Rene Maloney MD   220 mg at 04/03/24 0801        Physician Progress Notes (last 24 hours)        Stewart Alvarez APRN at 04/03/24 1055          Nephrology ([facility]) Progress Note      Patient:  Erick Luong  YOB: 1955  Date of Service: 4/3/2024  MRN: [id number]   Acct: [id number]   Primary Care Physician: Del Shetty MD  Advance Directive:   Code Status and Medical Interventions:   Ordered at: 03/26/24 1815     Level Of Support Discussed With:    Health Care Surrogate     Code Status (Patient has no pulse and is not breathing):    CPR (Attempt to Resuscitate)     Medical Interventions (Patient has pulse or is breathing):    Full Support     Admit Date: 3/26/2024       Hospital Day: 8  Referring Provider: No ref. provider found      Patient personally seen and examined.  Complete chart including Consults, Notes, Operative Reports, Labs, Cardiology, and Radiology studies reviewed as able.        Subjective:  Erick Luong is a 68 y.o. male for whom we were consulted for evaluation and treatment of acute kidney injury.  He has stage IIIb chronic kidney disease baseline, follows with Dr Lomas in our office.  Baseline creatinine approximately 1.8. He has history of insulin-dependent type 2 diabetes, hypertension, abdominal obesity, obstructive sleep apnea, diabetic foot ulcer and coronary artery disease.   Patient presented to ER on 3/26 with altered mental status. Had debridement of ongoing wound on left foot the day prior. Left foot  warm and erythremic on arrival to ER. Noted to have elevated blood glucose over 400. Initial creatinine of 1.9.  Admitted to medical floor for sepsis due to left foot wound. Patient has been agitated and confused during the admission, requiring wrist restraints due to pulling at lines and tubes. Renal function and mentation have been improving.    Today is awake and alert. No new complaint. Did receive Haldol once overnight for restlessness. Urine output nonoliguric     Allergies:  Cefepime, Bactrim [sulfamethoxazole-trimethoprim], Vancomycin, Zolpidem, Zolpidem tartrate, and Metronidazole    Home Meds:  Medications Prior to Admission   Medication Sig Dispense Refill Last Dose    amitriptyline (ELAVIL) 25 MG tablet Take 2 tablets by mouth Daily at bedtime. (Patient not taking: Reported on 3/27/2024) 60 tablet 1 Not Taking    apixaban (ELIQUIS) 5 MG tablet tablet Take 1 tablet by mouth Every 12 (Twelve) Hours.       ascorbic acid (VITAMIN C) 1000 MG tablet Take 1 tablet by mouth Daily. 30 tablet 3     Aspirin 81 MG capsule Take 81 mg by mouth Daily.       bumetanide (BUMEX) 1 MG tablet Take 1 tablet every day by oral route for 30 days. 30 tablet 0     bumetanide (BUMEX) 2 MG tablet Take 1 tablet by mouth Daily. Take 2 tablets in morning;1 tablet at night (Patient not taking: Reported on 3/27/2024)   Not Taking    busPIRone (BUSPAR) 10 MG tablet Take 1 tablet by mouth 3 (Three) Times a Day.       calcitriol (ROCALTROL) 0.5 MCG capsule Take 1 capsule by mouth Daily. 90 capsule 4     calcitriol (ROCALTROL) 0.5 MCG capsule Take 1 capsule every day by oral route for 90 days. (Patient not taking: Reported on 3/27/2024) 90 capsule 4 Not Taking    carvedilol (COREG) 3.125 MG tablet Take 1 tablet twice a day by oral route. 60 tablet 4     carvedilol (COREG) 6.25 MG tablet Take 1 tablet by mouth 2 (Two) Times a Day. (Patient not taking: Reported on 3/27/2024) 180 tablet 4 Not Taking    Cholecalciferol (D-5000) 125 MCG (5000  UT) tablet Take 1 tablet by mouth Daily Before Lunch. 30 tablet 2     cloNIDine (CATAPRES) 0.1 MG tablet Take 1 tablet by mouth Every 12 (Twelve) Hours. (Patient not taking: Reported on 3/27/2024) 60 tablet 2 Not Taking    Diclofenac Sodium (VOLTAREN) 1 % gel gel Apply 2 g topically to the appropriate area as directed 4 (Four) Times a Day As Needed. 300 g 11     Diclofenac Sodium (VOLTAREN) 1 % gel gel Apply 2 g topically to the appropriate area as directed 4 (Four) Times a Day. (Patient not taking: Reported on 3/27/2024) 300 g 11 Not Taking    donepezil (ARICEPT) 10 MG tablet Take 1 tablet by mouth Daily. (Patient not taking: Reported on 3/27/2024) 90 tablet 4 Not Taking    Dulaglutide (Trulicity) 4.5 MG/0.5ML solution pen-injector Inject 0.5 mL under the skin into the appropriate area as directed 1 (One) Time Per Week. (Patient not taking: Reported on 3/27/2024) 2 mL 11 Not Taking    DULoxetine (CYMBALTA) 60 MG capsule Take 1 capsule by mouth Daily. 90 capsule 4     DULoxetine (CYMBALTA) 60 MG capsule Take 1 capsule by mouth Daily. (Patient not taking: Reported on 3/27/2024) 90 capsule 4 Not Taking    DULoxetine (CYMBALTA) 60 MG capsule Take 1 capsule by mouth Daily. (Patient not taking: Reported on 3/27/2024) 90 capsule 4 Not Taking    empagliflozin (JARDIANCE) 25 MG tablet tablet Take 1 tablet by mouth Daily. (Patient not taking: Reported on 3/27/2024) 30 tablet 2 Not Taking    famotidine (PEPCID) 20 MG tablet Take 1 tablet twice a day by oral route. 180 tablet 4     fluticasone (FLONASE) 50 MCG/ACT nasal spray 1 spray into the nostril(s) as directed by provider Daily. (Patient taking differently: 1 spray into the nostril(s) as directed by provider 2 (Two) Times a Day.) 16 g 1     HYDROcodone-acetaminophen (NORCO) 7.5-325 MG per tablet Take 1 tablet by mouth 2 (Two) Times a Day As Needed. (Patient not taking: Reported on 3/27/2024) 40 tablet 0 Not Taking    Insulin Glargine (Lantus SoloStar) 100 UNIT/ML  injection pen Inject 20 Units under the skin into the appropriate area as directed Every Evening. 15 mL 3     Insulin Regular Human, Conc, (HumuLIN R U-500 KwikPen) 500 UNIT/ML solution pen-injector CONCENTRATED injection Inject 120 Units under the skin into the appropriate area as directed 2 (Two) Times a Day with breakfast and dinner. (Patient not taking: Reported on 3/27/2024) 18 mL 5 Not Taking    Insulin Regular Human, Conc, (HumuLIN R U-500 KwikPen) 500 UNIT/ML solution pen-injector CONCENTRATED injection Inject 40 Units under the skin into the appropriate area with regular meals AND 40 Units with large meals. 54 mL 3     isosorbide mononitrate (IMDUR) 30 MG 24 hr tablet Take 1 tablet by mouth Daily.       magnesium hydroxide (Milk of Magnesia) 400 MG/5ML suspension Take 30 mL every day by oral route for 30 days. 900 mL 0     magnesium hydroxide (Milk of Magnesia) 400 MG/5ML suspension Take 30mL by mouth once daily for 30 days. (Patient not taking: Reported on 3/27/2024) 900 mL 0 Not Taking    melatonin 3 MG tablet Take 1 tablet by mouth At Night As Needed for Sleep.       methylcellulose (Citrucel) oral powder Mix 5g as directed and drink twice daily for 90 days. 900 g 10     metoclopramide (REGLAN) 5 MG tablet Take 1 tablet by mouth 3 times a day.       midodrine (PROAMATINE) 2.5 MG tablet Take 1 tablet by mouth 3 (Three) Times a Day Before Meals.       nitroglycerin (NITROSTAT) 0.4 MG SL tablet Dissolve 1 tablet by mouth every 5 minutes as needed for chest pain; MAX 3 tabs/24 hours.  If no improvement after 3 tabs go to ER 25 tablet 0     ondansetron (ZOFRAN) 4 MG tablet Take 1 tablet by mouth Every 6 (Six) Hours As Needed for Nausea or Vomiting.       oxyCODONE (ROXICODONE) 10 MG tablet Take 1 tablet by mouth twice a day as needed 60 tablet 0     pantoprazole (Protonix) 40 MG EC tablet Take 1 tablet by mouth 2 (Two) Times a Day. (Patient not taking: Reported on 3/27/2024) 180 tablet 4 Not Taking     polyethylene glycol (MIRALAX) 17 g packet Take 17 g by mouth Daily. Obtain OTC       prazosin (MINIPRESS) 1 MG capsule Take 1 capsule by mouth every night at bedtime. 30 capsule 2     pregabalin (LYRICA) 100 MG capsule Take 2 capsules by mouth Every Night.       pregabalin (LYRICA) 50 MG capsule Take 1 capsule by mouth Daily.       rosuvastatin (CRESTOR) 10 MG tablet Take 1 tablet by mouth Daily.       sennosides-docusate (PERICOLACE) 8.6-50 MG per tablet Take 1 tablet by mouth Every Night. Obtain OTC       sennosides-docusate (PERICOLACE) 8.6-50 MG per tablet Take 1 tablet by mouth every night at bedtime. (Patient not taking: Reported on 3/27/2024) 30 tablet 2 Not Taking    sodium hypochlorite (DAKIN'S 1/4 STRENGTH) 0.125 % solution topical solution 0.125% Apply to affected area twice daily (Patient not taking: Reported on 3/27/2024) 473 mL 3 Not Taking    sodium hypochlorite (DAKIN'S 1/4 STRENGTH) 0.125 % solution topical solution 0.125% Apply to affected area twice daily as directed (Patient not taking: Reported on 3/27/2024) 473 mL 5 Not Taking    sodium hypochlorite (DAKIN'S) 0.25 % topical solution Use to wound daily as instructed 473 mL 1     sucralfate (CARAFATE) 1 g tablet Take 1 tablet by mouth 3 times a day.       tamsulosin (FLOMAX) 0.4 MG capsule 24 hr capsule Take 1 capsule by mouth Daily. 90 capsule 1     timolol (TIMOPTIC) 0.5 % ophthalmic solution Administer 1 drop to both eyes 2 (Two) Times a Day.       valsartan (DIOVAN) 40 MG tablet Take 1 tablet by mouth Daily.       zinc sulfate (ZINCATE) 50 MG capsule Take 1 capsule by mouth Daily.          Medicines:  Current Facility-Administered Medications   Medication Dose Route Frequency Provider Last Rate Last Admin    acetaminophen (TYLENOL) tablet 650 mg  650 mg Oral Q4H PRN Raquel Duncan APRN   650 mg at 04/03/24 0518    Or    acetaminophen (TYLENOL) 160 MG/5ML oral solution 650 mg  650 mg Oral Q4H PRN Raquel Duncan APRN   650 mg at  03/27/24 2109    Or    acetaminophen (TYLENOL) suppository 650 mg  650 mg Rectal Q4H PRN Raquel Duncan APRN        apixaban (ELIQUIS) tablet 5 mg  5 mg Oral Q12H Raquel Duncan APRN   5 mg at 04/03/24 0801    ascorbic acid (VITAMIN C) tablet 1,000 mg  1,000 mg Oral Daily Rene Maloney MD   1,000 mg at 04/03/24 0800    aspirin EC tablet 81 mg  81 mg Oral Daily Rene Maloney MD   81 mg at 04/03/24 0800    sennosides-docusate (PERICOLACE) 8.6-50 MG per tablet 1 tablet  1 tablet Oral BID Raquel Duncan APRN   1 tablet at 04/03/24 0801    And    polyethylene glycol (MIRALAX) packet 17 g  17 g Oral Daily PRN Raquel Duncan APRN        And    bisacodyl (DULCOLAX) EC tablet 5 mg  5 mg Oral Daily PRN Raquel Duncan APRN        And    bisacodyl (DULCOLAX) suppository 10 mg  10 mg Rectal Daily PRN Raquel Duncan APRN        calcitriol (ROCALTROL) capsule 0.5 mcg  0.5 mcg Oral Daily Rene Maloney MD   0.5 mcg at 04/03/24 0800    carvedilol (COREG) tablet 3.125 mg  3.125 mg Oral BID With Meals Rene Maloney MD   3.125 mg at 04/03/24 0801    dextrose (D50W) (25 g/50 mL) IV injection 25 g  25 g Intravenous Q15 Min PRN Raquel Duncan APRN        dextrose (GLUTOSE) oral gel 15 g  15 g Oral Q15 Min PRN Raquel Duncan APRN   15 g at 03/27/24 1710    Diclofenac Sodium (VOLTAREN) 1 % gel 2 g  2 g Topical 4x Daily PRN Rene Maloney MD        dicyclomine (BENTYL) capsule 20 mg  20 mg Oral TID PRN Rene Maloney MD   20 mg at 04/02/24 2234    donepezil (ARICEPT) tablet 10 mg  10 mg Oral Daily Raquel Duncan APRN   10 mg at 04/03/24 0802    famotidine (PEPCID) tablet 20 mg  20 mg Oral Daily Rene Maloney MD   20 mg at 04/03/24 0811    glucagon (GLUCAGEN) injection 1 mg  1 mg Intramuscular Q15 Min PRN Raquel Duncan, SUSAN        haloperidol lactate (HALDOL) injection 5 mg  5 mg Intravenous Q6H PRN Rene Maloney MD   5 mg at 04/03/24 0026    insulin detemir  (LEVEMIR) injection 30 Units  30 Units Subcutaneous Nightly Raquel Duncan APRN   30 Units at 04/02/24 2049    Insulin Lispro (humaLOG) injection 4-24 Units  4-24 Units Subcutaneous 4x Daily AC & at Bedtime Raquel Duncan APRN   8 Units at 04/02/24 1144    insulin regular (humuLIN R,novoLIN R) injection 10 Units  10 Units Subcutaneous TID AC Steve De Jesus MD   10 Units at 04/03/24 0811    isosorbide mononitrate (IMDUR) 24 hr tablet 30 mg  30 mg Oral Q24H Rene Maloney MD   30 mg at 04/03/24 0803    Linezolid (ZYVOX) 600 mg 300 mL  600 mg Intravenous Q12H Julia Adrian  mL/hr at 04/02/24 2317 600 mg at 04/02/24 2317    magnesium hydroxide (MILK OF MAGNESIA) suspension 10 mL  10 mL Oral Daily PRN Rene Maloney MD   10 mL at 04/03/24 0414    melatonin tablet 6 mg  6 mg Oral Nightly Rene Maloney MD   6 mg at 04/02/24 2035    midodrine (PROAMATINE) tablet 2.5 mg  2.5 mg Oral TID AC Rene Maloney MD   2.5 mg at 04/03/24 0801    Morphine sulfate (PF) injection 2 mg  2 mg Intravenous Q3H PRN Rene Maloney MD   2 mg at 04/03/24 0631    mupirocin (BACTROBAN) 2 % nasal ointment 1 Application  1 Application Each Nare BID Nadia Polo, APRN   1 Application at 04/03/24 0803    nitroglycerin (NITROSTAT) SL tablet 0.4 mg  0.4 mg Sublingual Q5 Min PRN Raquel Duncan APRN        ondansetron ODT (ZOFRAN-ODT) disintegrating tablet 4 mg  4 mg Oral Q6H PRN Raquel Duncan APRN   4 mg at 04/03/24 0537    Or    ondansetron (ZOFRAN) injection 4 mg  4 mg Intravenous Q6H PRN Raquel Duncan APRN   4 mg at 03/29/24 1837    oxyCODONE (ROXICODONE) immediate release tablet 10 mg  10 mg Oral BID PRN Raquel Duncan APRN   10 mg at 04/02/24 2036    pregabalin (LYRICA) capsule 100 mg  100 mg Oral Nightly Rene Maloney MD   100 mg at 04/02/24 2038    pregabalin (LYRICA) capsule 50 mg  50 mg Oral Daily Rene Maloney MD   50 mg at 04/03/24 0801    rosuvastatin  (CRESTOR) tablet 10 mg  10 mg Oral Nightly Rene Maloney MD   10 mg at 04/02/24 2037    sodium chloride 0.9 % flush 10 mL  10 mL Intravenous PRN Steve De Jesus MD        sodium chloride 0.9 % flush 10 mL  10 mL Intravenous Q12H Raquel Duncan APRN   10 mL at 04/03/24 0803    sodium chloride 0.9 % flush 10 mL  10 mL Intravenous PRN Raquel Duncan APRN        sodium chloride 0.9 % infusion 40 mL  40 mL Intravenous PRN Raquel Duncan, APRN        sucralfate (CARAFATE) 1 GM/10ML suspension 1 g  1 g Oral TID AC Rene Maloney MD   1 g at 04/03/24 0801    tamsulosin (FLOMAX) 24 hr capsule 0.4 mg  0.4 mg Oral Daily Rene Maloney MD   0.4 mg at 04/03/24 0811    timolol (TIMOPTIC) 0.25 % ophthalmic solution 1 drop  1 drop Both Eyes Q12H Rene Maloney MD   1 drop at 04/03/24 0812    vitamin D3 capsule 5,000 Units  5,000 Units Oral Daily Rene Maloney MD   5,000 Units at 04/03/24 0801    zinc sulfate (ZINCATE) capsule 220 mg  220 mg Oral Daily Rene Maloney MD   220 mg at 04/03/24 0801       Past Medical History:  Past Medical History:   Diagnosis Date    Arthritis     Autonomic disease     CAD (coronary artery disease) 02/06/2017    Cervical radiculopathy 09/16/2021    Chronic constipation with acute exaccerbation 05/10/2021    Coronary artery disease     Degeneration of cervical intervertebral disc 08/11/2021    Diabetes mellitus     Diabetic foot ulcer 08/31/2020    Diabetic polyneuropathy associated with type 2 diabetes mellitus 01/18/2021    Elevated cholesterol     Gastroesophageal reflux disease 05/13/2019    Headache     HTN (hypertension), benign 05/03/2017    Hyperlipidemia     Hypertension     Mixed hyperlipidemia 02/07/2017    Multiple lung nodules 01/26/2020    5mm, 9 mm RLL identified 1/2020, not present 10/2019.    Myocardial infarction     Osteomyelitis 01/22/2020    Osteomyelitis of fifth toe of right foot 10/07/2019    Pancreatitis     Persistent insomnia  01/20/2020    Renal disorder     Sleep apnea 02/06/2017    Sleep apnea with use of continuous positive airway pressure (CPAP)     NON-COMPLIANT    Slow transit constipation 01/16/2019    Spinal stenosis in cervical region 09/16/2021    Vitamin D deficiency 03/02/2021       Past Surgical History:  Past Surgical History:   Procedure Laterality Date    ABDOMINAL SURGERY      AMPUTATION FOOT / TOE Left 10/2021    5th digit     ANTERIOR CERVICAL DISCECTOMY W/ FUSION N/A 8/5/2022    Procedure: CERVICAL DISCECTOMY ANTERIOR WITH FUSION C5-6 with possible plating of C5-7 with neuromonitoring and 1 c-arm;  Surgeon: Karel Soliz MD;  Location:  [facility] OR;  Service: Neurosurgery;  Laterality: N/A;    APPENDECTOMY      BACK SURGERY      CARDIAC CATHETERIZATION Left 02/08/2021    Procedure: Left Heart Cath w poss intervention left anatomical snuff box acess;  Surgeon: Omkar Charles DO;  Location:  [facility] CATH INVASIVE LOCATION;  Service: Cardiology;  Laterality: Left;    CARDIAC SURGERY      CATARACT EXTRACTION      CERVICAL SPINE SURGERY      COLONOSCOPY N/A 01/31/2017    Normal exam repeat in 5 years    COLONOSCOPY N/A 02/11/2019    Mild acute inflammation    COLONOSCOPY W/ POLYPECTOMY  03/04/2014    Hyperplastic polyp    CORONARY ARTERY BYPASS GRAFT  10/2015    ENDOSCOPY  04/13/2011    Gastritis with hemorrhage    ENDOSCOPY N/A 05/05/2017    Normal exam    ENDOSCOPY N/A 02/11/2019    Gastritis    ENDOSCOPY N/A 09/01/2020    Non-erosive gastritis with hemorrhage    ENDOSCOPY N/A 02/10/2021    Esophagitis    FOOT SURGERY Left     INCISION AND DRAINAGE OF WOUND Left 09/2007    spider bite       Family History  Family History   Problem Relation Age of Onset    Colon cancer Father     Heart disease Father     Colon cancer Sister     Colon polyps Sister     Alzheimer's disease Mother     Coronary artery disease Sister     Coronary artery disease Sister        Social History  Social History     Socioeconomic History     Marital status:    Tobacco Use    Smoking status: Former     Current packs/day: 0.00     Types: Cigarettes     Quit date:      Years since quittin.2    Smokeless tobacco: Never    Tobacco comments:     smoked in highschool   Vaping Use    Vaping status: Never Used   Substance and Sexual Activity    Alcohol use: No    Drug use: No    Sexual activity: Defer       Review of Systems:  History obtained from chart review and the patient  General ROS: No fever or chills  Respiratory ROS: No cough, shortness of breath, wheezing  Cardiovascular ROS: No chest pain or palpitations  Gastrointestinal ROS: No abdominal pain or melena  Genito-Urinary ROS: No dysuria or hematuria  Psych ROS: No anxiety and depression  14 point ROS reviewed with the patient and negative except as noted above and in the HPI unless unable to obtain.    Objective:  Patient Vitals for the past 24 hrs:   BP Temp Temp src Pulse Resp SpO2 Weight   24 0805 146/68 97.5 °F (36.4 °C) Oral 58 18 98 % --   24 0550 -- -- -- -- -- -- 133 kg (292 lb 8.1 oz)   24 0446 128/68 97.8 °F (36.6 °C) Oral 61 18 98 % --   24 2232 132/72 97.7 °F (36.5 °C) Oral 63 18 100 % --   24 1957 122/58 97.8 °F (36.6 °C) Oral 56 18 99 % --   24 1554 157/78 97.2 °F (36.2 °C) Oral 67 18 97 % --   24 1206 138/64 96.7 °F (35.9 °C) Oral 64 16 100 % --       Intake/Output Summary (Last 24 hours) at 4/3/2024 1055  Last data filed at 4/3/2024 0836  Gross per 24 hour   Intake 240 ml   Output 1350 ml   Net -1110 ml     General: awake/alert   Chest:  clear to auscultation bilaterally without respiratory distress  CVS: regular rate and rhythm  Abdominal: soft, nontender, positive bowel sounds  Extremities:  bilateral 1+ edema  Skin: warm and dry without rash      Labs:  Results from last 7 days   Lab Units 24  0334 24  0617 24  0402   WBC 10*3/mm3 6.54 7.59 5.15   HEMOGLOBIN g/dL 9.0* 10.8* 9.9*   HEMATOCRIT % 28.9* 34.0*  30.6*   PLATELETS 10*3/mm3 197 197 162         Results from last 7 days   Lab Units 04/03/24  0334 04/02/24  0656 04/01/24  0402 03/31/24  0526 03/30/24  0620 03/29/24  0551   SODIUM mmol/L 139 139 135* 136 135* 138   POTASSIUM mmol/L 4.2 4.1 4.1 3.6 3.5 3.3*   CHLORIDE mmol/L 104 104 101 101 100 103   CO2 mmol/L 25.0 26.0 23.0 23.0 24.0 23.0   BUN mg/dL 37* 43* 46* 51* 51* 40*   CREATININE mg/dL 1.76* 2.02* 2.18* 2.52* 2.74* 2.07*   CALCIUM mg/dL 9.3 9.8 9.1 8.6 8.2* 8.4*   EGFR mL/min/1.73 41.9* 35.5* 32.4* 27.2* 24.6* 34.5*   BILIRUBIN mg/dL  --   --   --  0.5 0.5 0.7   ALK PHOS U/L  --   --   --  110 95 81   ALT (SGPT) U/L  --   --   --  19 19 17   AST (SGOT) U/L  --   --   --  30 32 32   GLUCOSE mg/dL 118* 106* 292* 201* 104* 120*       Radiology:   Imaging Results (Last 72 Hours)       ** No results found for the last 72 hours. **            Culture:  Blood Culture   Date Value Ref Range Status   03/26/2024 Staphylococcus aureus, MRSA (C)  Final     Comment:       Infectious disease consultation is highly recommended to rule out distant foci of infection.  Methicillin resistant Staphylococcus aureus, Patient may be an isolation risk.   03/26/2024 Staphylococcus aureus, MRSA (C)  Final     Comment:       Infectious disease consultation is highly recommended to rule out distant foci of infection.  Methicillin resistant Staphylococcus aureus, Patient may be an isolation risk.         Assessment    Acute kidney injury, ATN--resolving  Baseline chronic kidney disease stage 3b  Type 2 diabetes, poorly controlled (A1c 10.8%)  Hypertension   Metabolic encephalopathy--improved  Anemia of CKD  Hypokalemia--improved  Sepsis due to infection of left foot wound    Plan:  OK to resume daily Bumex  Renal function is now at baseline level  Monitor labs      SUSAN Calvert  4/3/2024  10:55 CDT      Electronically signed by Stewart Alvarez APRN at 04/03/24 1100       Latanya Paez MD at 04/03/24 0949               [facility] Medicine Services  INPATIENT PROGRESS NOTE    Patient Name: Erick Luong  Date of Admission: 3/26/2024  Today's Date: 04/03/24  Length of Stay: 8  Primary Care Physician: Del Shetty MD    Subjective   Chief Complaint: Altered mental status  Erick Luong is a 68-year-old male with a past medical history of coronary artery disease, diastolic dysfunction followed by Dr. Garay, vascular disease, GERD, diabetes, chronic anemia, chronic nonhealing wound to the left foot followed by [facility] wound care.  Patient had an extended hospitalization 8/2023 due to syncope, subsequent admission to [facility] 9/18 - 10/11, 2023.  During that admission he did undergo debridement of Proteus wound infection.     Patient seen by PCP on 3/11/2024, started on prazosin 1 mg daily, duloxetine 60 mg daily magnesium hydroxide 400 mg daily per office note.  Wife is unaware of exactly what patient is taking.  Will need pharmacy to obtain correct med list.     W[facility] who performed debridement on the plantar wound treating with Santyl, is also a area on dorsal aspect of the foot.  Foot is erythemic, warm to touch.  Doctor told patient and wife he was doing well.  There was no signs of infection yesterday.  Today patient was brought to emergency department via EMS for altered mental status with disorientation, glucose was noted to be over 500 in the ambulance.  Initial glucose here 400 followed with 438 treated with regular insulin.  I have taken care of this patient many times.  He awakens for questions.  He is somewhat somnolent.  Remainder workup reveals creatinine 1.9 at baseline, CRP 4.3, lactic acid 3.2 and procalcitonin 6.56.  He does have a white count of 14.6, no bandemia.  Initially afebrile however now 100.7, patient meets criteria for sepsis with tachycardia and tachypnea, elevated lactic acid and source of  infection.  There is no organ failure.  Patient is admitted for simple sepsis, condition stabl  3/31   Patient is alert and oriented x 3.  He appears to be in good spirits.  He is requesting for discharge.  He was counseled that we will need to wait until later in this week, after we set up IV antibiotics before we can discharge him.    4/1  Patient was admitted to ER on 3/26 with altered mental status. Had debridement of ongoing wound on left foot the day prior. Left foot warm and erythremic on arrival to ER. Noted to have elevated blood glucose over 400. Initial creatinine of 1.9.  [facility] nephrology were consulted for acute on chronic renal failure MRI of the left foot did not show any osteomyelitis or abscess.  [facility] ID patient was found to have MRSA bacteremia-blood cultures + March 26 and March 29.  Secondary to infected left foot with wounds and associated cellulitis on admission patient was started on Zyvox plan is for echo today rule out endocarditis given persistent bacteremia holding off on PICC line pending that   4/2  As before history of coronary disease diabetes chronic diabetic foot ulcer noncompliance with A1c 10.4 presented to us with mental status change was found to have MRSA sepsis bacteremia secondary to his infected foot MRI did not show any osteo or abscess has been on Zyvox echo was ordered to rule out endocarditis for persistent bacteremia, podiatry recommends wound care and follow-up with outpatient wound care [facility] Currently plan for at least 2 weeks of linezolid with start date after clearance of blood cultures-currently March 30. Should be able to transition to oral linezolid given increased bioavailability. Duration may change based on echocardiogram findings , [facility] nephrology acute on chronic renal failure secondary to ATN resolving  4/3  Remains on Zyvox blood cultures remains negative echo of the heart could not rule out endocarditis poor quality recommending  YANIV if endocarditis is a concern we will leave this up to the infectious disease attending appreciate nephrology renal function is getting better, per podiatry continue wound care continue antibiotics MRI is showing no evidence of soft tissue abscess or osteomyelitis podiatry will see as outpatient, patient A1c is 10.8 needs much better control of his diabetes and much better follow-up with his family doctor regarding his diabetes, and is having stomach distention and bloating last bowel movement was 3 days ago will do KUB and start him on lactulose  Review of Systems   All pertinent negatives and positives are as above. All other systems have been reviewed and are negative unless otherwise stated.     Objective    Temp:  [96.7 °F (35.9 °C)-97.8 °F (36.6 °C)] 97.5 °F (36.4 °C)  Heart Rate:  [56-67] 58  Resp:  [16-18] 18  BP: (122-157)/(58-78) 146/68  Physical Exam  Constitutional:       General: He is not in acute distress.     Appearance: He is well-developed. He is not diaphoretic.   HENT:      Head: Normocephalic.   Eyes:      General: No scleral icterus.     Conjunctiva/sclera: Conjunctivae normal.      Pupils: Pupils are equal, round, and reactive to light.   Neck:      Thyroid: No thyromegaly.      Vascular: No JVD.      Trachea: No tracheal deviation.   Cardiovascular:      Rate and Rhythm: Normal rate and regular rhythm.      Heart sounds: Normal heart sounds. No murmur heard.     No friction rub. No gallop.   Pulmonary:      Effort: Pulmonary effort is normal. No respiratory distress.      Breath sounds: Normal breath sounds. No stridor. No wheezing or rales.   Chest:      Chest wall: No tenderness.   Abdominal:      General: Bowel sounds are normal. There is no distension.      Palpations: Abdomen is soft. There is no mass.      Tenderness: There is no abdominal tenderness. There is no guarding or rebound.      Hernia: No hernia is present.   Musculoskeletal:         General: Swelling and signs of injury  present. No tenderness or deformity. Normal range of motion.      Cervical back: Normal range of motion and neck supple.      Right lower leg: No edema.      Comments: Dressing over left foot noted.   Lymphadenopathy:      Cervical: No cervical adenopathy.   Skin:     General: Skin is warm and dry.      Coloration: Skin is not pale.      Findings: No erythema or rash.   Neurological:      General: No focal deficit present.      Mental Status: He is alert and oriented to person, place, and time.      Cranial Nerves: No cranial nerve deficit.      Sensory: No sensory deficit.      Motor: No abnormal muscle tone.      Coordination: Coordination normal.   Psychiatric:         Mood and Affect: Mood normal.         Behavior: Behavior normal.            File Link    Scan on 3/26/2024 1423 by Ro Vinson RN: Wound 06/15/23 0102 Left anterior plantar        Key Information    Document ID File Type Document Type Description   [id number] Image WOUND IMAGE Wound 06/15/23 0102 Left anterior plantar     Import Information    Attached At Date Time User Dept   Encounter Level 3/26/2024  2:23 PM Ro Vinson RN [facility] Emergency Dept     Encounter    Hospital Encounter on 3/26/24     Results Review:  I have reviewed the labs, radiology results, and diagnostic studies.    Laboratory Data:   Results from last 7 days   Lab Units 04/03/24  0334 04/02/24  0617 04/01/24  0402   WBC 10*3/mm3 6.54 7.59 5.15   HEMOGLOBIN g/dL 9.0* 10.8* 9.9*   HEMATOCRIT % 28.9* 34.0* 30.6*   PLATELETS 10*3/mm3 197 197 162        Results from last 7 days   Lab Units 04/03/24  0334 04/02/24  0656 04/01/24  0402 03/31/24  0526 03/30/24  0620 03/29/24  0551   SODIUM mmol/L 139 139 135* 136 135* 138   POTASSIUM mmol/L 4.2 4.1 4.1 3.6 3.5 3.3*   CHLORIDE mmol/L 104 104 101 101 100 103   CO2 mmol/L 25.0 26.0 23.0 23.0 24.0 23.0   BUN mg/dL 37* 43* 46* 51* 51* 40*   CREATININE mg/dL 1.76* 2.02* 2.18* 2.52* 2.74* 2.07*   CALCIUM mg/dL 9.3 9.8 9.1 8.6  8.2* 8.4*   BILIRUBIN mg/dL  --   --   --  0.5 0.5 0.7   ALK PHOS U/L  --   --   --  110 95 81   ALT (SGPT) U/L  --   --   --  19 19 17   AST (SGOT) U/L  --   --   --  30 32 32   GLUCOSE mg/dL 118* 106* 292* 201* 104* 120*       Culture Data:   Blood Culture   Date Value Ref Range Status   03/26/2024 Abnormal Stain (C)  Preliminary   03/26/2024 Abnormal Stain (C)  Preliminary       Radiology Data:   Imaging Results (Last 24 Hours)       ** No results found for the last 24 hours. **            I have reviewed the patient's current medications.     Assessment/Plan   Assessment  Active Hospital Problems    Diagnosis     **Sepsis     Diabetic foot infection     Chronic kidney disease (CKD), stage IV (severe)     Chronic diastolic heart failure     BPH without obstruction/lower urinary tract symptoms     Diabetic polyneuropathy associated with type 2 diabetes mellitus     Anemia due to chronic kidney disease     Essential hypertension     Type 2 diabetes mellitus with hyperglycemia, with long-term current use of insulin    MRSA bacteremia  Metabolic encephalopathy secondary to sepsis  WANDER on CKD    Treatment Plan  Continue IV antibiotics with Zyvox. Infectious disease input appreciated.  Mental status appears back to baseline now that sepsis is better controlled.  However he has persistently positive blood cultures.  Plan for 2D echo on Monday.  Will hold off on inserting PICC line for now until blood cultures are clear.    Podiatry consultation input appreciated.  Will follow recommendations.  Continue wound dressings as per podiatry Follow-up repeat blood cultures.    Nephrology and urology input appreciated.  Hold Bumex due to elevated creatinine and BUN.  No signs of urinary retention at this time.    Pain control with opiate pain medications.    Insulin sliding scale and Levemir for type 2 diabetes mellitus    DVT prophylaxis with Eliquis    PT/OT/wound care consultations    DVT prophylaxis with Eliquis    CODE  "STATUS is full code    Medical Decision Making  Number and Complexity of problems: 4 acute, severe, high complexity medical problems.  Multiple chronic medical problems.  Differential Diagnosis: None    Conditions and Status        Condition is worsening.     [facility] Data  External documents reviewed: None  Cardiac tracing (EKG, telemetry) interpretation: None  Radiology interpretation: None  Labs reviewed: CBC, BMP, blood cultures reviewed by me  Any tests that were considered but not ordered: None     Decision rules/scores evaluated (example [id number], Wells, etc): N/A     Discussed with: Patient     Care Planning  Shared decision making: Patient and his wife  Code status and discussions: CODE STATUS is full code    Disposition  Social Determinants of Health that impact treatment or disposition: None  I expect the patient to be discharged to home in 3 to 5 days.     Electronically signed by Latanya Paez MD, 04/03/24, 09:49 CDT.     Electronically signed by Latanya Paez MD at 04/03/24 1235       Julia Adrian MD at 04/03/24 0741          INFECTIOUS DISEASES PROGRESS NOTE    Patient:  Erick uLong  YOB: 1955  MRN: [id number]   Admit date: 3/26/2024   Admitting Physician: Latanya Paez MD  Primary Care Physician: Del Shetty MD    Chief Complaint: Abdominal pain      Interval History: Patient still says he is having some abdominal pain.  He denied vomiting or diarrhea      Allergies:   Allergies   Allergen Reactions    Cefepime Hives and Anaphylaxis    Bactrim [Sulfamethoxazole-Trimethoprim] Other (See Comments)     \"RENAL FAILURE\"    Vancomycin Itching    Zolpidem Unknown - High Severity     \"makes him crazy\"    Zolpidem Tartrate Unknown - Low Severity and Provider Review Needed    Metronidazole Rash       Current Scheduled Medications:   apixaban, 5 mg, Oral, Q12H  ascorbic acid, 1,000 mg, Oral, Daily  aspirin, 81 mg, Oral, Daily  calcitriol, 0.5 mcg, Oral, Daily  carvedilol, " "3.125 mg, Oral, BID With Meals  donepezil, 10 mg, Oral, Daily  famotidine, 20 mg, Oral, Daily  insulin detemir, 30 Units, Subcutaneous, Nightly  insulin lispro, 4-24 Units, Subcutaneous, 4x Daily AC & at Bedtime  insulin regular, 10 Units, Subcutaneous, TID AC  isosorbide mononitrate, 30 mg, Oral, Q24H  Linezolid, 600 mg, Intravenous, Q12H  melatonin, 6 mg, Oral, Nightly  midodrine, 2.5 mg, Oral, TID AC  mupirocin, 1 Application, Each Nare, BID  pregabalin, 100 mg, Oral, Nightly  pregabalin, 50 mg, Oral, Daily  rosuvastatin, 10 mg, Oral, Nightly  senna-docusate sodium, 1 tablet, Oral, BID  sodium chloride, 10 mL, Intravenous, Q12H  sucralfate, 1 g, Oral, TID AC  tamsulosin, 0.4 mg, Oral, Daily  timolol, 1 drop, Both Eyes, Q12H  vitamin D3, 5,000 Units, Oral, Daily  zinc sulfate, 220 mg, Oral, Daily      Current PRN Medications:    acetaminophen **OR** acetaminophen **OR** acetaminophen    senna-docusate sodium **AND** polyethylene glycol **AND** bisacodyl **AND** bisacodyl    dextrose    dextrose    Diclofenac Sodium    dicyclomine    glucagon (human recombinant)    haloperidol lactate    magnesium hydroxide    Morphine    nitroglycerin    ondansetron ODT **OR** ondansetron    oxyCODONE    sodium chloride    sodium chloride    sodium chloride            Objective     Vital Signs:  Temp (24hrs), Av.4 °F (36.3 °C), Min:96.7 °F (35.9 °C), Max:97.8 °F (36.6 °C)      /68 (BP Location: Left arm, Patient Position: Lying)   Pulse 61   Temp 97.8 °F (36.6 °C) (Oral)   Resp 18   Ht 182.9 cm (72\")   Wt 133 kg (292 lb 8.1 oz)   SpO2 98%   BMI 39.67 kg/m²         Physical Exam:  General: Patient sitting up in the recliner no acute distress  Respiratory: Effort even and unlabored  Abdomen: Soft, no rebound or guarding.  Does complain of tenderness with palpation.  Left foot dressing clean dry and intact    Left foot photos below                        esults Review:    I reviewed the patient's new clinical " results.    Lab Results:    CBC:   Lab Results   Lab 03/28/24  1004 03/29/24  0551 03/30/24  0620 03/31/24  0526 04/01/24  0402 04/02/24  0617 04/03/24  0334   WBC 9.45 6.08 5.69 5.01 5.15 7.59 6.54   HEMOGLOBIN 13.7 11.1* 10.0* 10.3* 9.9* 10.8* 9.0*   HEMATOCRIT 41.1 33.6* 30.6* 31.6* 30.6* 34.0* 28.9*   PLATELETS 154 156 155 149 162 197 197        AutoDiff:   Lab Results   Lab 04/01/24  0402 04/02/24  0617 04/03/24  0334   NEUTROPHIL % 55.5 61.4 55.7   LYMPHOCYTE % 28.3 24.1 29.5   MONOCYTES % 10.7 10.0 9.3   EOSINOPHIL % 4.7 3.7 4.4   BASOPHIL % 0.4 0.4 0.5   NEUTROS ABS 2.86 4.66 3.64   LYMPHS ABS 1.46 1.83 1.93   MONOS ABS 0.55 0.76 0.61   EOS ABS 0.24 0.28 0.29   BASOS ABS 0.02 0.03 0.03        Manual Diff:    Lab Results   Lab 04/01/24  0402 04/02/24  0617 04/03/24  0334   NEUTROS ABS 2.86 4.66 3.64           CMP:   Lab Results   Lab 03/29/24  0551 03/30/24  0620 03/31/24  0526 04/01/24  0402 04/02/24  0656 04/03/24  0334   SODIUM 138 135* 136 135* 139 139   POTASSIUM 3.3* 3.5 3.6 4.1 4.1 4.2   CHLORIDE 103 100 101 101 104 104   CO2 23.0 24.0 23.0 23.0 26.0 25.0   BUN 40* 51* 51* 46* 43* 37*   CREATININE 2.07* 2.74* 2.52* 2.18* 2.02* 1.76*   CALCIUM 8.4* 8.2* 8.6 9.1 9.8 9.3   BILIRUBIN 0.7 0.5 0.5  --   --   --    ALK PHOS 81 95 110  --   --   --    ALT (SGPT) 17 19 19  --   --   --    AST (SGOT) 32 32 30  --   --   --    GLUCOSE 120* 104* 201* 292* 106* 118*       Estimated Creatinine Clearance: 57.5 mL/min (A) (by C-G formula based on SCr of 1.76 mg/dL (H)).    Culture Results:    Microbiology Results (last 10 days)       Procedure Component Value - Date/Time    Blood Culture With DARSHAN - Blood, Hand, Right [[id number]]  (Normal) Collected: 04/01/24 0402    Lab Status: Preliminary result Specimen: Blood from Hand, Right Updated: 04/03/24 0445     Blood Culture No growth at 2 days    Blood Culture With DARSHAN - Blood, Arm, Left [[id number]]  (Normal) Collected: 03/30/24 0620    Lab Status: Preliminary result  Specimen: Blood from Arm, Left Updated: 04/03/24 0645     Blood Culture No growth at 4 days    Blood Culture With DARSHAN - Blood, Hand, Right [[id number]]  (Abnormal)  (Susceptibility) Collected: 03/29/24 0843    Lab Status: Final result Specimen: Blood from Hand, Right Updated: 04/02/24 0521     Blood Culture Staphylococcus aureus, MRSA     Comment:   Methicillin resistant Staphylococcus aureus, Patient may be an isolation risk.  Infectious disease consultation is highly recommended to rule out distant foci of infection.        Isolated from Aerobic Bottle     Gram Stain Aerobic Bottle Gram positive cocci    Susceptibility        Staphylococcus aureus, MRSA      MATT      Gentamicin Susceptible      Oxacillin Resistant      Rifampin Susceptible      Vancomycin Susceptible                       Susceptibility Comments       Staphylococcus aureus, MRSA    This isolate is presumed to be clindamycin resistant based on detection of inducible clindamycin resistance.  Clindamycin may still be effective in some patients.               Blood Culture ID, PCR - Blood, Hand, Right [[id number]]  (Abnormal) Collected: 03/29/24 0843    Lab Status: Final result Specimen: Blood from Hand, Right Updated: 03/30/24 2028     BCID, PCR Staph aureus. mecA/C and MREJ (methicillin resistance gene) detected. Identification by BCID2 PCR.     BOTTLE TYPE Aerobic Bottle    Narrative:      Infectious disease consultation is highly recommended to rule out distant foci of infection.    MRSA Screen, PCR (Inpatient) - Swab, Nares [[id number]]  (Abnormal) Collected: 03/28/24 2128    Lab Status: Final result Specimen: Swab from Nares Updated: 03/28/24 2239     MRSA PCR MRSA Detected    Narrative:      The negative predictive value of this diagnostic test is high and should only be used to consider de-escalating anti-MRSA therapy. A positive result may indicate colonization with MRSA and must be correlated clinically.    AMAN AURIS SCREEN - Swab, Axilla  Right, Axilla Left and Groin [[id number]]  (Normal) Collected: 03/27/24 0351    Lab Status: Final result Specimen: Swab from Axilla Right, Axilla Left and Groin Updated: 04/01/24 0415     Candida Auris Screen Culture No Candida auris isolated at 5 days    Respiratory Panel PCR w/COVID-19(SARS-CoV-2) MICHAEL/ANKUSH/SEAN/PAD/COR/ROSE MARY In-House, NP Swab in UTM/VTM, 2 HR TAT - Swab, Nasopharynx [[id number]]  (Normal) Collected: 03/26/24 1732    Lab Status: Final result Specimen: Swab from Nasopharynx Updated: 03/26/24 1905     ADENOVIRUS, PCR Not Detected     Coronavirus 229E Not Detected     Coronavirus HKU1 Not Detected     Coronavirus NL63 Not Detected     Coronavirus OC43 Not Detected     COVID19 Not Detected     Human Metapneumovirus Not Detected     Human Rhinovirus/Enterovirus Not Detected     Influenza A PCR Not Detected     Influenza B PCR Not Detected     Parainfluenza Virus 1 Not Detected     Parainfluenza Virus 2 Not Detected     Parainfluenza Virus 3 Not Detected     Parainfluenza Virus 4 Not Detected     RSV, PCR Not Detected     Bordetella pertussis pcr Not Detected     Bordetella parapertussis PCR Not Detected     Chlamydophila pneumoniae PCR Not Detected     Mycoplasma pneumo by PCR Not Detected    Narrative:      In the setting of a positive respiratory panel with a viral infection PLUS a negative procalcitonin without other underlying concern for bacterial infection, consider observing off antibiotics or discontinuation of antibiotics and continue supportive care. If the respiratory panel is positive for atypical bacterial infection (Bordetella pertussis, Chlamydophila pneumoniae, or Mycoplasma pneumoniae), consider antibiotic de-escalation to target atypical bacterial infection.    Blood Culture - Blood, Arm, Right [[id number]]  (Abnormal) Collected: 03/26/24 1346    Lab Status: Final result Specimen: Blood from Arm, Right Updated: 03/29/24 0513     Blood Culture Staphylococcus aureus, MRSA     Comment:    Infectious disease consultation is highly recommended to rule out distant foci of infection.  Methicillin resistant Staphylococcus aureus, Patient may be an isolation risk.        Isolated from Aerobic and Anaerobic Bottles     Gram Stain Aerobic Bottle Gram positive cocci      Anaerobic Bottle Gram positive cocci    Blood Culture - Blood, Arm, Left [[id number]]  (Abnormal)  (Susceptibility) Collected: 03/26/24 1346    Lab Status: Final result Specimen: Blood from Arm, Left Updated: 03/29/24 0513     Blood Culture Staphylococcus aureus, MRSA     Comment:   Infectious disease consultation is highly recommended to rule out distant foci of infection.  Methicillin resistant Staphylococcus aureus, Patient may be an isolation risk.        Isolated from Aerobic and Anaerobic Bottles     Gram Stain Aerobic Bottle Gram positive cocci      Anaerobic Bottle Gram positive cocci    Susceptibility        Staphylococcus aureus, MRSA      AMTT      Gentamicin Susceptible      Oxacillin Resistant      Rifampin Susceptible      Vancomycin Susceptible                       Susceptibility Comments       Staphylococcus aureus, MRSA    This isolate is presumed to be clindamycin resistant based on detection of inducible clindamycin resistance.  Clindamycin may still be effective in some patients.               Blood Culture ID, PCR - Blood, Arm, Left [[id number]]  (Abnormal) Collected: 03/26/24 1346    Lab Status: Final result Specimen: Blood from Arm, Left Updated: 03/27/24 0426     BCID, PCR Staph aureus. mecA/C and MREJ (methicillin resistance gene) detected. Identification by BCID2 PCR.     BOTTLE TYPE Aerobic Bottle    Narrative:      Infectious disease consultation is highly recommended to rule out distant foci of infection.             Interpretation Summary         The study is technically difficult for diagnosis.  If there is concern for possible infective endocarditis, recommend transesophageal echocardiogram to better visualize  the valves.    Left ventricular systolic function is normal. Left ventricular ejection fraction appears to be 61 - 65%.    Left ventricular wall thickness is consistent with mild concentric hypertrophy    Left ventricular diastolic function is consistent with (grade III w/high LAP) fixed restrictive pattern.    Mildly reduced right ventricular systolic function noted.    The left atrial cavity is mildly dilated.    There is mild calcification of the aortic valve. No aortic valve regurgitation is present. No hemodynamically significant aortic valve stenosis is present.       Signed    Electronically signed by Omkar Charles DO on 4/2/24 at 1412 CDT       Radiology:   Imaging Results (Last 72 Hours)       ** No results found for the last 72 hours. **                Active Hospital Problems    Diagnosis     **Sepsis     Diabetic foot infection     Chronic kidney disease (CKD), stage IV (severe)     Chronic diastolic heart failure     BPH without obstruction/lower urinary tract symptoms     Diabetic polyneuropathy associated with type 2 diabetes mellitus     Anemia due to chronic kidney disease     Essential hypertension     Type 2 diabetes mellitus with hyperglycemia, with long-term current use of insulin        IMPRESSION:  MRSA bacteremia-blood cultures + March 26 and March 29.  Secondary to infected left foot with wounds and associated cellulitis on admission.  Blood culture March 30 are negative at 4 days and blood cultures from April 1 negative at 2 days  Chronic kidney disease stage IIIb-with acute kidney injury-creatinine continues to improve since Bumex was discontinued  Type 2 diabetes mellitus-poorly controlled with hemoglobin A1c of 10.7 this admission-  History of GI bleed-reviewed this with patient's wife.  Patient on Carafate and famotidine.  Per patient's wife he was on Protonix at 1 point but was switched to famotidine at recent rehab facility.  Hemoglobin remained stable.  Abdominal  "discomfort.  Patient denies nausea or vomiting or diarrhea.  He is eating well.  May be related to linezolid is nonspecific abdominal pain has been reported with linezolid.  MRSA nasal screen positive      RECOMMENDATION:   Continue linezolid-although unclear if this is related to his complaints of abdominal pain.  Would start with plain x-ray before moving to CT abdomen/pelvis.  Continue think about alternatives given \"allergy\" to vancomycin and chronic kidney disease while keeping with standard of care for MRSA bacteremia.  Given lack of persistent bacteremia, do not feel like we need to proceed with YANIV at this time.  Will need to monitor CBC closely given linezolid's risk of bone marrow suppression.  Wound care per Dr. Cerrato's orders  Continue to monitor surveillance blood cultures from  March 30 and April 1 until complete.  Glucose management per attending    Currently plan for at least 2 weeks of linezolid with start date after clearance of blood cultures-currently March 30.  Should be able to transition to oral linezolid given increased bioavailability.         Julia Adrian MD  04/03/24  07:41 CDT        Electronically signed by Julia Adrian MD at 04/03/24 1238       Willy Arteaga MD at 04/02/24 1800          Nephrology ([facility]) Progress Note      Patient:  Erick Luong  YOB: 1955  Date of Service: 4/2/2024  MRN: [id number]   Acct: [id number]   Primary Care Physician: Del Shetty MD  Advance Directive:   Code Status and Medical Interventions:   Ordered at: 03/26/24 1815     Level Of Support Discussed With:    Health Care Surrogate     Code Status (Patient has no pulse and is not breathing):    CPR (Attempt to Resuscitate)     Medical Interventions (Patient has pulse or is breathing):    Full Support     Admit Date: 3/26/2024       Hospital Day: 7  Referring Provider: No ref. provider found      Patient personally seen and " examined.  Complete chart including Consults, Notes, Operative Reports, Labs, Cardiology, and Radiology studies reviewed as able.        Subjective:  Erick Luong is a 68 y.o. male for whom we were consulted for evaluation and treatment of cute kidney injury.  He has stage IIIb chronic kidney disease baseline and follows with Dr Lomas in our office.  Baseline creatinine approximately 1.8. He has a history of insulin-dependent type 2 diabetes, hypertension, abdominal obesity, obstructive sleep apnea, diabetic foot ulcer and coronary artery disease.   Patient presented to ER on 3/26 with altered mental status.  He had debridement of ongoing wound on left foot the day prior. Left foot was found to be warm and erythremic on arrival to ER.  He was noted to have elevated blood glucose over 400. Initial creatinine was 1.9.  He was admitted to medical floor for sepsis due to left foot wound. Patient had been agitated and confused during the admission, requiring wrist restraints due to pulling at lines and tubes.      Today, patient appears in his usual mental state of health.  He was up in chair with family visiting.  Still with some peripheral edema but better than my last examination with him.  He denied other complaints upon questioning aside from some abdominal pain earlier.    Allergies:  Cefepime, Bactrim [sulfamethoxazole-trimethoprim], Vancomycin, Zolpidem, Zolpidem tartrate, and Metronidazole    Home Meds:  Medications Prior to Admission   Medication Sig Dispense Refill Last Dose    amitriptyline (ELAVIL) 25 MG tablet Take 2 tablets by mouth Daily at bedtime. (Patient not taking: Reported on 3/27/2024) 60 tablet 1 Not Taking    apixaban (ELIQUIS) 5 MG tablet tablet Take 1 tablet by mouth Every 12 (Twelve) Hours.       ascorbic acid (VITAMIN C) 1000 MG tablet Take 1 tablet by mouth Daily. 30 tablet 3     Aspirin 81 MG capsule Take 81 mg by mouth Daily.       bumetanide (BUMEX) 1 MG tablet Take 1 tablet every day by  oral route for 30 days. 30 tablet 0     bumetanide (BUMEX) 2 MG tablet Take 1 tablet by mouth Daily. Take 2 tablets in morning;1 tablet at night (Patient not taking: Reported on 3/27/2024)   Not Taking    busPIRone (BUSPAR) 10 MG tablet Take 1 tablet by mouth 3 (Three) Times a Day.       calcitriol (ROCALTROL) 0.5 MCG capsule Take 1 capsule by mouth Daily. 90 capsule 4     calcitriol (ROCALTROL) 0.5 MCG capsule Take 1 capsule every day by oral route for 90 days. (Patient not taking: Reported on 3/27/2024) 90 capsule 4 Not Taking    carvedilol (COREG) 3.125 MG tablet Take 1 tablet twice a day by oral route. 60 tablet 4     carvedilol (COREG) 6.25 MG tablet Take 1 tablet by mouth 2 (Two) Times a Day. (Patient not taking: Reported on 3/27/2024) 180 tablet 4 Not Taking    Cholecalciferol (D-5000) 125 MCG (5000 UT) tablet Take 1 tablet by mouth Daily Before Lunch. 30 tablet 2     cloNIDine (CATAPRES) 0.1 MG tablet Take 1 tablet by mouth Every 12 (Twelve) Hours. (Patient not taking: Reported on 3/27/2024) 60 tablet 2 Not Taking    Diclofenac Sodium (VOLTAREN) 1 % gel gel Apply 2 g topically to the appropriate area as directed 4 (Four) Times a Day As Needed. 300 g 11     Diclofenac Sodium (VOLTAREN) 1 % gel gel Apply 2 g topically to the appropriate area as directed 4 (Four) Times a Day. (Patient not taking: Reported on 3/27/2024) 300 g 11 Not Taking    donepezil (ARICEPT) 10 MG tablet Take 1 tablet by mouth Daily. (Patient not taking: Reported on 3/27/2024) 90 tablet 4 Not Taking    Dulaglutide (Trulicity) 4.5 MG/0.5ML solution pen-injector Inject 0.5 mL under the skin into the appropriate area as directed 1 (One) Time Per Week. (Patient not taking: Reported on 3/27/2024) 2 mL 11 Not Taking    DULoxetine (CYMBALTA) 60 MG capsule Take 1 capsule by mouth Daily. 90 capsule 4     DULoxetine (CYMBALTA) 60 MG capsule Take 1 capsule by mouth Daily. (Patient not taking: Reported on 3/27/2024) 90 capsule 4 Not Taking    DULoxetine  (CYMBALTA) 60 MG capsule Take 1 capsule by mouth Daily. (Patient not taking: Reported on 3/27/2024) 90 capsule 4 Not Taking    empagliflozin (JARDIANCE) 25 MG tablet tablet Take 1 tablet by mouth Daily. (Patient not taking: Reported on 3/27/2024) 30 tablet 2 Not Taking    famotidine (PEPCID) 20 MG tablet Take 1 tablet twice a day by oral route. 180 tablet 4     fluticasone (FLONASE) 50 MCG/ACT nasal spray 1 spray into the nostril(s) as directed by provider Daily. (Patient taking differently: 1 spray into the nostril(s) as directed by provider 2 (Two) Times a Day.) 16 g 1     HYDROcodone-acetaminophen (NORCO) 7.5-325 MG per tablet Take 1 tablet by mouth 2 (Two) Times a Day As Needed. (Patient not taking: Reported on 3/27/2024) 40 tablet 0 Not Taking    Insulin Glargine (Lantus SoloStar) 100 UNIT/ML injection pen Inject 20 Units under the skin into the appropriate area as directed Every Evening. 15 mL 3     Insulin Regular Human, Conc, (HumuLIN R U-500 KwikPen) 500 UNIT/ML solution pen-injector CONCENTRATED injection Inject 120 Units under the skin into the appropriate area as directed 2 (Two) Times a Day with breakfast and dinner. (Patient not taking: Reported on 3/27/2024) 18 mL 5 Not Taking    Insulin Regular Human, Conc, (HumuLIN R U-500 KwikPen) 500 UNIT/ML solution pen-injector CONCENTRATED injection Inject 40 Units under the skin into the appropriate area with regular meals AND 40 Units with large meals. 54 mL 3     isosorbide mononitrate (IMDUR) 30 MG 24 hr tablet Take 1 tablet by mouth Daily.       magnesium hydroxide (Milk of Magnesia) 400 MG/5ML suspension Take 30 mL every day by oral route for 30 days. 900 mL 0     magnesium hydroxide (Milk of Magnesia) 400 MG/5ML suspension Take 30mL by mouth once daily for 30 days. (Patient not taking: Reported on 3/27/2024) 900 mL 0 Not Taking    melatonin 3 MG tablet Take 1 tablet by mouth At Night As Needed for Sleep.       methylcellulose (Citrucel) oral powder Mix  5g as directed and drink twice daily for 90 days. 900 g 10     metoclopramide (REGLAN) 5 MG tablet Take 1 tablet by mouth 3 times a day.       midodrine (PROAMATINE) 2.5 MG tablet Take 1 tablet by mouth 3 (Three) Times a Day Before Meals.       nitroglycerin (NITROSTAT) 0.4 MG SL tablet Dissolve 1 tablet by mouth every 5 minutes as needed for chest pain; MAX 3 tabs/24 hours.  If no improvement after 3 tabs go to ER 25 tablet 0     ondansetron (ZOFRAN) 4 MG tablet Take 1 tablet by mouth Every 6 (Six) Hours As Needed for Nausea or Vomiting.       oxyCODONE (ROXICODONE) 10 MG tablet Take 1 tablet by mouth twice a day as needed 60 tablet 0     pantoprazole (Protonix) 40 MG EC tablet Take 1 tablet by mouth 2 (Two) Times a Day. (Patient not taking: Reported on 3/27/2024) 180 tablet 4 Not Taking    polyethylene glycol (MIRALAX) 17 g packet Take 17 g by mouth Daily. Obtain OTC       prazosin (MINIPRESS) 1 MG capsule Take 1 capsule by mouth every night at bedtime. 30 capsule 2     pregabalin (LYRICA) 100 MG capsule Take 2 capsules by mouth Every Night.       pregabalin (LYRICA) 50 MG capsule Take 1 capsule by mouth Daily.       rosuvastatin (CRESTOR) 10 MG tablet Take 1 tablet by mouth Daily.       sennosides-docusate (PERICOLACE) 8.6-50 MG per tablet Take 1 tablet by mouth Every Night. Obtain OTC       sennosides-docusate (PERICOLACE) 8.6-50 MG per tablet Take 1 tablet by mouth every night at bedtime. (Patient not taking: Reported on 3/27/2024) 30 tablet 2 Not Taking    sodium hypochlorite (DAKIN'S 1/4 STRENGTH) 0.125 % solution topical solution 0.125% Apply to affected area twice daily (Patient not taking: Reported on 3/27/2024) 473 mL 3 Not Taking    sodium hypochlorite (DAKIN'S 1/4 STRENGTH) 0.125 % solution topical solution 0.125% Apply to affected area twice daily as directed (Patient not taking: Reported on 3/27/2024) 473 mL 5 Not Taking    sodium hypochlorite (DAKIN'S) 0.25 % topical solution Use to wound daily as  instructed 473 mL 1     sucralfate (CARAFATE) 1 g tablet Take 1 tablet by mouth 3 times a day.       tamsulosin (FLOMAX) 0.4 MG capsule 24 hr capsule Take 1 capsule by mouth Daily. 90 capsule 1     timolol (TIMOPTIC) 0.5 % ophthalmic solution Administer 1 drop to both eyes 2 (Two) Times a Day.       valsartan (DIOVAN) 40 MG tablet Take 1 tablet by mouth Daily.       zinc sulfate (ZINCATE) 50 MG capsule Take 1 capsule by mouth Daily.          Medicines:  Current Facility-Administered Medications   Medication Dose Route Frequency Provider Last Rate Last Admin    acetaminophen (TYLENOL) tablet 650 mg  650 mg Oral Q4H PRN Raquel Duncan APRN   650 mg at 04/01/24 0446    Or    acetaminophen (TYLENOL) 160 MG/5ML oral solution 650 mg  650 mg Oral Q4H PRN Raquel Duncan APRN   650 mg at 03/27/24 2109    Or    acetaminophen (TYLENOL) suppository 650 mg  650 mg Rectal Q4H PRN Raquel Duncan APRN        apixaban (ELIQUIS) tablet 5 mg  5 mg Oral Q12H Raquel Duncan APRN   5 mg at 04/02/24 2037    ascorbic acid (VITAMIN C) tablet 1,000 mg  1,000 mg Oral Daily Rene Maloney MD   1,000 mg at 04/02/24 0944    aspirin EC tablet 81 mg  81 mg Oral Daily Rene Maloney MD   81 mg at 04/02/24 0946    sennosides-docusate (PERICOLACE) 8.6-50 MG per tablet 1 tablet  1 tablet Oral BID Raquel Duncan APRN   1 tablet at 04/02/24 2037    And    polyethylene glycol (MIRALAX) packet 17 g  17 g Oral Daily PRN Raquel Duncan APRN        And    bisacodyl (DULCOLAX) EC tablet 5 mg  5 mg Oral Daily PRN Raquel Duncan APRN        And    bisacodyl (DULCOLAX) suppository 10 mg  10 mg Rectal Daily PRN Raquel Duncan APRN        calcitriol (ROCALTROL) capsule 0.5 mcg  0.5 mcg Oral Daily Rene Maloney MD   0.5 mcg at 04/02/24 0945    carvedilol (COREG) tablet 3.125 mg  3.125 mg Oral BID With Meals Rene Maloney MD   3.125 mg at 04/02/24 1733    dextrose (D50W) (25 g/50 mL) IV injection 25 g  25 g  Intravenous Q15 Min PRN Raquel Duncan, APRN        dextrose (GLUTOSE) oral gel 15 g  15 g Oral Q15 Min PRN Raquel Duncan APRSHERRILL   15 g at 03/27/24 1710    Diclofenac Sodium (VOLTAREN) 1 % gel 2 g  2 g Topical 4x Daily PRN Rene Maloney MD        dicyclomine (BENTYL) capsule 20 mg  20 mg Oral TID PRN Rene Maloney MD   20 mg at 03/31/24 1603    donepezil (ARICEPT) tablet 10 mg  10 mg Oral Daily Raquel Duncan, APRN   10 mg at 04/02/24 0947    famotidine (PEPCID) tablet 20 mg  20 mg Oral Daily Rene Maloney MD   20 mg at 04/02/24 0945    glucagon (GLUCAGEN) injection 1 mg  1 mg Intramuscular Q15 Min PRN Raquel Duncan, APRN        haloperidol lactate (HALDOL) injection 5 mg  5 mg Intravenous Q6H PRN Rene Maloney MD        insulin detemir (LEVEMIR) injection 30 Units  30 Units Subcutaneous Nightly Raquel Duncan, APRN   30 Units at 04/02/24 2049    Insulin Lispro (humaLOG) injection 4-24 Units  4-24 Units Subcutaneous 4x Daily AC & at Bedtime Raquel Duncan, APRN   8 Units at 04/02/24 1144    insulin regular (humuLIN R,novoLIN R) injection 10 Units  10 Units Subcutaneous TID AC Steve De Jesus MD   10 Units at 04/02/24 1732    isosorbide mononitrate (IMDUR) 24 hr tablet 30 mg  30 mg Oral Q24H Rene Maloney MD   30 mg at 04/02/24 0944    Linezolid (ZYVOX) 600 mg 300 mL  600 mg Intravenous Q12H Julia Adrian  mL/hr at 04/02/24 1131 600 mg at 04/02/24 1131    magnesium hydroxide (MILK OF MAGNESIA) suspension 10 mL  10 mL Oral Daily PRN Rene Maloney MD   10 mL at 03/31/24 1202    melatonin tablet 6 mg  6 mg Oral Nightly Rene Maloney MD   6 mg at 04/02/24 2035    midodrine (PROAMATINE) tablet 2.5 mg  2.5 mg Oral TID AC Rene Maloney MD   2.5 mg at 04/02/24 1733    Morphine sulfate (PF) injection 2 mg  2 mg Intravenous Q3H PRN Rene Maloney MD   2 mg at 04/02/24 1129    mupirocin (BACTROBAN) 2 % nasal ointment 1 Application  1  Application Each Nare BID Nadia Polo, APRN   1 Application at 04/02/24 2038    nitroglycerin (NITROSTAT) SL tablet 0.4 mg  0.4 mg Sublingual Q5 Min PRN Raquel Duncan APRN        ondansetron ODT (ZOFRAN-ODT) disintegrating tablet 4 mg  4 mg Oral Q6H PRN Raquel Duncan, APRN   4 mg at 04/01/24 1313    Or    ondansetron (ZOFRAN) injection 4 mg  4 mg Intravenous Q6H PRN Raquel Duncan, APRN   4 mg at 03/29/24 1837    oxyCODONE (ROXICODONE) immediate release tablet 10 mg  10 mg Oral BID PRN Raquel Duncan APRN   10 mg at 04/02/24 2036    pregabalin (LYRICA) capsule 100 mg  100 mg Oral Nightly Rene Maloney MD   100 mg at 04/02/24 2038    pregabalin (LYRICA) capsule 50 mg  50 mg Oral Daily Rene Maloney MD   50 mg at 04/02/24 0942    rosuvastatin (CRESTOR) tablet 10 mg  10 mg Oral Nightly Rene Maloney MD   10 mg at 04/02/24 2037    sodium chloride 0.9 % flush 10 mL  10 mL Intravenous PRN Steve De Jesus MD        sodium chloride 0.9 % flush 10 mL  10 mL Intravenous Q12H Raquel Duncan APRN   10 mL at 04/02/24 2039    sodium chloride 0.9 % flush 10 mL  10 mL Intravenous PRN Raquel Duncan APRN        sodium chloride 0.9 % infusion 40 mL  40 mL Intravenous PRN Raquel Duncan APRN        sucralfate (CARAFATE) 1 GM/10ML suspension 1 g  1 g Oral TID AC Rene Maloney MD   1 g at 04/02/24 1732    tamsulosin (FLOMAX) 24 hr capsule 0.4 mg  0.4 mg Oral Daily Rene Maloney MD   0.4 mg at 04/02/24 0945    timolol (TIMOPTIC) 0.25 % ophthalmic solution 1 drop  1 drop Both Eyes Q12H Rene Maloney MD   1 drop at 04/02/24 2035    vitamin D3 capsule 5,000 Units  5,000 Units Oral Daily Rene Maloney MD   5,000 Units at 04/02/24 0942    zinc sulfate (ZINCATE) capsule 220 mg  220 mg Oral Daily Rene Maloney MD   220 mg at 04/02/24 0947       Past Medical History:  Past Medical History:   Diagnosis Date    Arthritis     Autonomic disease     CAD  (coronary artery disease) 02/06/2017    Cervical radiculopathy 09/16/2021    Chronic constipation with acute exaccerbation 05/10/2021    Coronary artery disease     Degeneration of cervical intervertebral disc 08/11/2021    Diabetes mellitus     Diabetic foot ulcer 08/31/2020    Diabetic polyneuropathy associated with type 2 diabetes mellitus 01/18/2021    Elevated cholesterol     Gastroesophageal reflux disease 05/13/2019    Headache     HTN (hypertension), benign 05/03/2017    Hyperlipidemia     Hypertension     Mixed hyperlipidemia 02/07/2017    Multiple lung nodules 01/26/2020    5mm, 9 mm RLL identified 1/2020, not present 10/2019.    Myocardial infarction     Osteomyelitis 01/22/2020    Osteomyelitis of fifth toe of right foot 10/07/2019    Pancreatitis     Persistent insomnia 01/20/2020    Renal disorder     Sleep apnea 02/06/2017    Sleep apnea with use of continuous positive airway pressure (CPAP)     NON-COMPLIANT    Slow transit constipation 01/16/2019    Spinal stenosis in cervical region 09/16/2021    Vitamin D deficiency 03/02/2021       Past Surgical History:  Past Surgical History:   Procedure Laterality Date    ABDOMINAL SURGERY      AMPUTATION FOOT / TOE Left 10/2021    5th digit     ANTERIOR CERVICAL DISCECTOMY W/ FUSION N/A 8/5/2022    Procedure: CERVICAL DISCECTOMY ANTERIOR WITH FUSION C5-6 with possible plating of C5-7 with neuromonitoring and 1 c-arm;  Surgeon: Karel Soliz MD;  Location:  [facility] OR;  Service: Neurosurgery;  Laterality: N/A;    APPENDECTOMY      BACK SURGERY      CARDIAC CATHETERIZATION Left 02/08/2021    Procedure: Left Heart Cath w poss intervention left anatomical snuff box acess;  Surgeon: Omkar Charles DO;  Location:  [facility] CATH INVASIVE LOCATION;  Service: Cardiology;  Laterality: Left;    CARDIAC SURGERY      CATARACT EXTRACTION      CERVICAL SPINE SURGERY      COLONOSCOPY N/A 01/31/2017    Normal exam repeat in 5 years    COLONOSCOPY N/A 02/11/2019     Mild acute inflammation    COLONOSCOPY W/ POLYPECTOMY  2014    Hyperplastic polyp    CORONARY ARTERY BYPASS GRAFT  10/2015    ENDOSCOPY  2011    Gastritis with hemorrhage    ENDOSCOPY N/A 2017    Normal exam    ENDOSCOPY N/A 2019    Gastritis    ENDOSCOPY N/A 2020    Non-erosive gastritis with hemorrhage    ENDOSCOPY N/A 02/10/2021    Esophagitis    FOOT SURGERY Left     INCISION AND DRAINAGE OF WOUND Left 2007    spider bite       Family History  Family History   Problem Relation Age of Onset    Colon cancer Father     Heart disease Father     Colon cancer Sister     Colon polyps Sister     Alzheimer's disease Mother     Coronary artery disease Sister     Coronary artery disease Sister        Social History  Social History     Socioeconomic History    Marital status:    Tobacco Use    Smoking status: Former     Current packs/day: 0.00     Types: Cigarettes     Quit date:      Years since quittin.2    Smokeless tobacco: Never    Tobacco comments:     smoked in [employer]   Vaping Use    Vaping status: Never Used   Substance and Sexual Activity    Alcohol use: No    Drug use: No    Sexual activity: Defer       Review of Systems:  History obtained from chart review and the patient  General ROS: No fever or chills  Respiratory ROS: No cough, shortness of breath, wheezing  Cardiovascular ROS: No chest pain or palpitations  Gastrointestinal ROS: No nausea or vomiting  Genito-Urinary ROS: No dysuria or hematuria  Psych ROS: No anxiety and depression  14 point ROS reviewed with the patient and negative except as noted above and in the HPI unless unable to obtain.    Objective:  Patient Vitals for the past 24 hrs:   BP Temp Temp src Pulse Resp SpO2 Height Weight   24 1957 122/58 97.8 °F (36.6 °C) Oral 56 18 99 % -- --   24 1554 157/78 97.2 °F (36.2 °C) Oral 67 18 97 % -- --   24 1206 138/64 96.7 °F (35.9 °C) Oral 64 16 100 % -- --   24 1030 126/68 --  "-- -- -- -- 182.9 cm (72\") 133 kg (294 lb)   04/02/24 0700 126/68 96.8 °F (36 °C) Oral 60 16 100 % -- --   04/02/24 0557 -- -- -- -- -- -- -- 134 kg (294 lb 12.8 oz)   04/02/24 0300 132/69 97.4 °F (36.3 °C) Oral 62 16 100 % -- --   04/02/24 0016 122/46 97.4 °F (36.3 °C) Oral 63 16 97 % -- --       Intake/Output Summary (Last 24 hours) at 4/2/2024 2107  Last data filed at 4/2/2024 1900  Gross per 24 hour   Intake --   Output 1175 ml   Net -1175 ml     General: awake/alert   Chest:  clear to auscultation bilaterally without respiratory distress  CVS: regular rate and rhythm  Abdominal: soft, nontender, positive bowel sounds  Extremities: ble edema  Skin: warm and dry without rash      Labs:  Results from last 7 days   Lab Units 04/02/24  0617 04/01/24  0402 03/31/24  0526   WBC 10*3/mm3 7.59 5.15 5.01   HEMOGLOBIN g/dL 10.8* 9.9* 10.3*   HEMATOCRIT % 34.0* 30.6* 31.6*   PLATELETS 10*3/mm3 197 162 149         Results from last 7 days   Lab Units 04/02/24  0656 04/01/24  0402 03/31/24  0526 03/30/24  0620 03/29/24  0551   SODIUM mmol/L 139 135* 136 135* 138   POTASSIUM mmol/L 4.1 4.1 3.6 3.5 3.3*   CHLORIDE mmol/L 104 101 101 100 103   CO2 mmol/L 26.0 23.0 23.0 24.0 23.0   BUN mg/dL 43* 46* 51* 51* 40*   CREATININE mg/dL 2.02* 2.18* 2.52* 2.74* 2.07*   CALCIUM mg/dL 9.8 9.1 8.6 8.2* 8.4*   EGFR mL/min/1.73 35.5* 32.4* 27.2* 24.6* 34.5*   BILIRUBIN mg/dL  --   --  0.5 0.5 0.7   ALK PHOS U/L  --   --  110 95 81   ALT (SGPT) U/L  --   --  19 19 17   AST (SGOT) U/L  --   --  30 32 32   GLUCOSE mg/dL 106* 292* 201* 104* 120*       Radiology:   Imaging Results (Last 72 Hours)       ** No results found for the last 72 hours. **            Culture:  Blood Culture   Date Value Ref Range Status   04/01/2024 No growth at 24 hours  Preliminary   03/30/2024 No growth at 3 days  Preliminary   03/29/2024 Staphylococcus aureus, MRSA (C)  Final     Comment:       Methicillin resistant Staphylococcus aureus, Patient may be an isolation " risk.  Infectious disease consultation is highly recommended to rule out distant foci of infection.         Assessment   Acute kidney injury  Chronic kidney disease stage IIIb  Diabetes type 2 with diabetic nephropathy  Hypertension  Hypokalemia  Anemia and chronic kidney disease  MRSA bacteremia     Plan:  Discussed with patient, nursing  Workup reviewed today  ID and podiatry evaluations reviewed  Holding Bumex for now-likely can restart tomorrow  Antibiotics per ID recommendations        Willy Arteaga MD  4/2/2024  21:07 CDT        Electronically signed by Willy Arteaga MD at 04/02/24 2875

## 2024-04-03 NOTE — PROGRESS NOTES
"INFECTIOUS DISEASES PROGRESS NOTE    Patient:  Erick Luong  YOB: 1955  MRN: [id number]   Admit date: 3/26/2024   Admitting Physician: Latanya Paez MD  Primary Care Physician: Del Shetty MD    Chief Complaint: Abdominal pain      Interval History: Patient still says he is having some abdominal pain.  He denied vomiting or diarrhea      Allergies:   Allergies   Allergen Reactions    Cefepime Hives and Anaphylaxis    Bactrim [Sulfamethoxazole-Trimethoprim] Other (See Comments)     \"RENAL FAILURE\"    Vancomycin Itching    Zolpidem Unknown - High Severity     \"makes him crazy\"    Zolpidem Tartrate Unknown - Low Severity and Provider Review Needed    Metronidazole Rash       Current Scheduled Medications:   apixaban, 5 mg, Oral, Q12H  ascorbic acid, 1,000 mg, Oral, Daily  aspirin, 81 mg, Oral, Daily  calcitriol, 0.5 mcg, Oral, Daily  carvedilol, 3.125 mg, Oral, BID With Meals  donepezil, 10 mg, Oral, Daily  famotidine, 20 mg, Oral, Daily  insulin detemir, 30 Units, Subcutaneous, Nightly  insulin lispro, 4-24 Units, Subcutaneous, 4x Daily AC & at Bedtime  insulin regular, 10 Units, Subcutaneous, TID AC  isosorbide mononitrate, 30 mg, Oral, Q24H  Linezolid, 600 mg, Intravenous, Q12H  melatonin, 6 mg, Oral, Nightly  midodrine, 2.5 mg, Oral, TID AC  mupirocin, 1 Application, Each Nare, BID  pregabalin, 100 mg, Oral, Nightly  pregabalin, 50 mg, Oral, Daily  rosuvastatin, 10 mg, Oral, Nightly  senna-docusate sodium, 1 tablet, Oral, BID  sodium chloride, 10 mL, Intravenous, Q12H  sucralfate, 1 g, Oral, TID AC  tamsulosin, 0.4 mg, Oral, Daily  timolol, 1 drop, Both Eyes, Q12H  vitamin D3, 5,000 Units, Oral, Daily  zinc sulfate, 220 mg, Oral, Daily      Current PRN Medications:    acetaminophen **OR** acetaminophen **OR** acetaminophen    senna-docusate sodium **AND** polyethylene glycol **AND** bisacodyl **AND** bisacodyl    dextrose    dextrose    Diclofenac Sodium    dicyclomine    glucagon (human " "recombinant)    haloperidol lactate    magnesium hydroxide    Morphine    nitroglycerin    ondansetron ODT **OR** ondansetron    oxyCODONE    sodium chloride    sodium chloride    sodium chloride            Objective     Vital Signs:  Temp (24hrs), Av.4 °F (36.3 °C), Min:96.7 °F (35.9 °C), Max:97.8 °F (36.6 °C)      /68 (BP Location: Left arm, Patient Position: Lying)   Pulse 61   Temp 97.8 °F (36.6 °C) (Oral)   Resp 18   Ht 182.9 cm (72\")   Wt 133 kg (292 lb 8.1 oz)   SpO2 98%   BMI 39.67 kg/m²         Physical Exam:  General: Patient sitting up in the recliner no acute distress  Respiratory: Effort even and unlabored  Abdomen: Soft, no rebound or guarding.  Does complain of tenderness with palpation.  Left foot dressing clean dry and intact    Left foot photos below                        esults Review:    I reviewed the patient's new clinical results.    Lab Results:    CBC:   Lab Results   Lab 24  1004 24  0551 24  0620 24  0526 24  0402 24  0617 24  0334   WBC 9.45 6.08 5.69 5.01 5.15 7.59 6.54   HEMOGLOBIN 13.7 11.1* 10.0* 10.3* 9.9* 10.8* 9.0*   HEMATOCRIT 41.1 33.6* 30.6* 31.6* 30.6* 34.0* 28.9*   PLATELETS 154 156 155 149 162 197 197        AutoDiff:   Lab Results   Lab 24  0402 24  0617 24  0334   NEUTROPHIL % 55.5 61.4 55.7   LYMPHOCYTE % 28.3 24.1 29.5   MONOCYTES % 10.7 10.0 9.3   EOSINOPHIL % 4.7 3.7 4.4   BASOPHIL % 0.4 0.4 0.5   NEUTROS ABS 2.86 4.66 3.64   LYMPHS ABS 1.46 1.83 1.93   MONOS ABS 0.55 0.76 0.61   EOS ABS 0.24 0.28 0.29   BASOS ABS 0.02 0.03 0.03        Manual Diff:    Lab Results   Lab 24  0402 24  0617 24  0334   NEUTROS ABS 2.86 4.66 3.64           CMP:   Lab Results   Lab 24  0551 24  0620 24  0526 24  0402 24  0656 24  0334   SODIUM 138 135* 136 135* 139 139   POTASSIUM 3.3* 3.5 3.6 4.1 4.1 4.2   CHLORIDE 103 100 101 101 104 104   CO2 23.0 24.0 23.0 " 23.0 26.0 25.0   BUN 40* 51* 51* 46* 43* 37*   CREATININE 2.07* 2.74* 2.52* 2.18* 2.02* 1.76*   CALCIUM 8.4* 8.2* 8.6 9.1 9.8 9.3   BILIRUBIN 0.7 0.5 0.5  --   --   --    ALK PHOS 81 95 110  --   --   --    ALT (SGPT) 17 19 19  --   --   --    AST (SGOT) 32 32 30  --   --   --    GLUCOSE 120* 104* 201* 292* 106* 118*       Estimated Creatinine Clearance: 57.5 mL/min (A) (by C-G formula based on SCr of 1.76 mg/dL (H)).    Culture Results:    Microbiology Results (last 10 days)       Procedure Component Value - Date/Time    Blood Culture With DARSHAN - Blood, Hand, Right [[id number]]  (Normal) Collected: 04/01/24 0402    Lab Status: Preliminary result Specimen: Blood from Hand, Right Updated: 04/03/24 0445     Blood Culture No growth at 2 days    Blood Culture With DARSHAN - Blood, Arm, Left [[id number]]  (Normal) Collected: 03/30/24 0620    Lab Status: Preliminary result Specimen: Blood from Arm, Left Updated: 04/03/24 0645     Blood Culture No growth at 4 days    Blood Culture With DARSHAN - Blood, Hand, Right [[id number]]  (Abnormal)  (Susceptibility) Collected: 03/29/24 0843    Lab Status: Final result Specimen: Blood from Hand, Right Updated: 04/02/24 0521     Blood Culture Staphylococcus aureus, MRSA     Comment:   Methicillin resistant Staphylococcus aureus, Patient may be an isolation risk.  Infectious disease consultation is highly recommended to rule out distant foci of infection.        Isolated from Aerobic Bottle     Gram Stain Aerobic Bottle Gram positive cocci    Susceptibility        Staphylococcus aureus, MRSA      MATT      Gentamicin Susceptible      Oxacillin Resistant      Rifampin Susceptible      Vancomycin Susceptible                       Susceptibility Comments       Staphylococcus aureus, MRSA    This isolate is presumed to be clindamycin resistant based on detection of inducible clindamycin resistance.  Clindamycin may still be effective in some patients.               Blood Culture ID, PCR - Blood, Hand,  Right [[id number]]  (Abnormal) Collected: 03/29/24 0843    Lab Status: Final result Specimen: Blood from Hand, Right Updated: 03/30/24 2028     BCID, PCR Staph aureus. mecA/C and MREJ (methicillin resistance gene) detected. Identification by BCID2 PCR.     BOTTLE TYPE Aerobic Bottle    Narrative:      Infectious disease consultation is highly recommended to rule out distant foci of infection.    MRSA Screen, PCR (Inpatient) - Swab, Nares [[id number]]  (Abnormal) Collected: 03/28/24 2128    Lab Status: Final result Specimen: Swab from Nares Updated: 03/28/24 2239     MRSA PCR MRSA Detected    Narrative:      The negative predictive value of this diagnostic test is high and should only be used to consider de-escalating anti-MRSA therapy. A positive result may indicate colonization with MRSA and must be correlated clinically.    AMAN AURIS SCREEN - Swab, Axilla Right, Axilla Left and Groin [[id number]]  (Normal) Collected: 03/27/24 0351    Lab Status: Final result Specimen: Swab from Axilla Right, Axilla Left and Groin Updated: 04/01/24 0415     Candida Auris Screen Culture No Candida auris isolated at 5 days    Respiratory Panel PCR w/COVID-19(SARS-CoV-2) [facility] In-[facility], NP Swab in UTM/VTM, 2 HR TAT - Swab, Nasopharynx [[id number]]  (Normal) Collected: 03/26/24 1732    Lab Status: Final result Specimen: Swab from Nasopharynx Updated: 03/26/24 1905     ADENOVIRUS, PCR Not Detected     Coronavirus 229E Not Detected     Coronavirus HKU1 Not Detected     Coronavirus NL63 Not Detected     Coronavirus OC43 Not Detected     COVID19 Not Detected     Human Metapneumovirus Not Detected     Human Rhinovirus/Enterovirus Not Detected     Influenza A PCR Not Detected     Influenza B PCR Not Detected     Parainfluenza Virus 1 Not Detected     Parainfluenza Virus 2 Not Detected     Parainfluenza Virus 3 Not Detected     Parainfluenza Virus 4 Not Detected     RSV, PCR Not Detected     Bordetella pertussis pcr Not  Detected     Bordetella parapertussis PCR Not Detected     Chlamydophila pneumoniae PCR Not Detected     Mycoplasma pneumo by PCR Not Detected    Narrative:      In the setting of a positive respiratory panel with a viral infection PLUS a negative procalcitonin without other underlying concern for bacterial infection, consider observing off antibiotics or discontinuation of antibiotics and continue supportive care. If the respiratory panel is positive for atypical bacterial infection (Bordetella pertussis, Chlamydophila pneumoniae, or Mycoplasma pneumoniae), consider antibiotic de-escalation to target atypical bacterial infection.    Blood Culture - Blood, Arm, Right [[id number]]  (Abnormal) Collected: 03/26/24 1346    Lab Status: Final result Specimen: Blood from Arm, Right Updated: 03/29/24 0513     Blood Culture Staphylococcus aureus, MRSA     Comment:   Infectious disease consultation is highly recommended to rule out distant foci of infection.  Methicillin resistant Staphylococcus aureus, Patient may be an isolation risk.        Isolated from Aerobic and Anaerobic Bottles     Gram Stain Aerobic Bottle Gram positive cocci      Anaerobic Bottle Gram positive cocci    Blood Culture - Blood, Arm, Left [[id number]]  (Abnormal)  (Susceptibility) Collected: 03/26/24 1346    Lab Status: Final result Specimen: Blood from Arm, Left Updated: 03/29/24 0513     Blood Culture Staphylococcus aureus, MRSA     Comment:   Infectious disease consultation is highly recommended to rule out distant foci of infection.  Methicillin resistant Staphylococcus aureus, Patient may be an isolation risk.        Isolated from Aerobic and Anaerobic Bottles     Gram Stain Aerobic Bottle Gram positive cocci      Anaerobic Bottle Gram positive cocci    Susceptibility        Staphylococcus aureus, MRSA      MATT      Gentamicin Susceptible      Oxacillin Resistant      Rifampin Susceptible      Vancomycin Susceptible                        Susceptibility Comments       Staphylococcus aureus, MRSA    This isolate is presumed to be clindamycin resistant based on detection of inducible clindamycin resistance.  Clindamycin may still be effective in some patients.               Blood Culture ID, PCR - Blood, Arm, Left [[id number]]  (Abnormal) Collected: 03/26/24 1346    Lab Status: Final result Specimen: Blood from Arm, Left Updated: 03/27/24 0421     BCID, PCR Staph aureus. mecA/C and MREJ (methicillin resistance gene) detected. Identification by BCID2 PCR.     BOTTLE TYPE Aerobic Bottle    Narrative:      Infectious disease consultation is highly recommended to rule out distant foci of infection.             Interpretation Summary         The study is technically difficult for diagnosis.  If there is concern for possible infective endocarditis, recommend transesophageal echocardiogram to better visualize the valves.    Left ventricular systolic function is normal. Left ventricular ejection fraction appears to be 61 - 65%.    Left ventricular wall thickness is consistent with mild concentric hypertrophy    Left ventricular diastolic function is consistent with (grade III w/high LAP) fixed restrictive pattern.    Mildly reduced right ventricular systolic function noted.    The left atrial cavity is mildly dilated.    There is mild calcification of the aortic valve. No aortic valve regurgitation is present. No hemodynamically significant aortic valve stenosis is present.       Signed    Electronically signed by Omkar Charles DO on 4/2/24 at 1412 CDT       Radiology:   Imaging Results (Last 72 Hours)       ** No results found for the last 72 hours. **                Active Hospital Problems    Diagnosis     **Sepsis     Diabetic foot infection     Chronic kidney disease (CKD), stage IV (severe)     Chronic diastolic heart failure     BPH without obstruction/lower urinary tract symptoms     Diabetic polyneuropathy associated with type 2 diabetes  "mellitus     Anemia due to chronic kidney disease     Essential hypertension     Type 2 diabetes mellitus with hyperglycemia, with long-term current use of insulin        IMPRESSION:  MRSA bacteremia-blood cultures + March 26 and March 29.  Secondary to infected left foot with wounds and associated cellulitis on admission.  Blood culture March 30 are negative at 4 days and blood cultures from April 1 negative at 2 days  Chronic kidney disease stage IIIb-with acute kidney injury-creatinine continues to improve since Bumex was discontinued  Type 2 diabetes mellitus-poorly controlled with hemoglobin A1c of 10.7 this admission-  History of GI bleed-reviewed this with patient's wife.  Patient on Carafate and famotidine.  Per patient's wife he was on Protonix at 1 point but was switched to famotidine at recent rehab facility.  Hemoglobin remained stable.  Abdominal discomfort.  Patient denies nausea or vomiting or diarrhea.  He is eating well.  May be related to linezolid is nonspecific abdominal pain has been reported with linezolid.  MRSA nasal screen positive      RECOMMENDATION:   Continue linezolid-although unclear if this is related to his complaints of abdominal pain.  Would start with plain x-ray before moving to CT abdomen/pelvis.  Continue think about alternatives given \"allergy\" to vancomycin and chronic kidney disease while keeping with standard of care for MRSA bacteremia.  Given lack of persistent bacteremia, do not feel like we need to proceed with YANIV at this time.  Will need to monitor CBC closely given linezolid's risk of bone marrow suppression.  Wound care per Dr. Cerrato's orders  Continue to monitor surveillance blood cultures from  March 30 and April 1 until complete.  Glucose management per attending    Currently plan for at least 2 weeks of linezolid with start date after clearance of blood cultures-currently March 30.  Should be able to transition to oral linezolid given increased bioavailability. "         Julia Adrian MD  04/03/24  07:41 CDT

## 2024-04-03 NOTE — PROGRESS NOTES
Nephrology ([facility]) Progress Note      Patient:  Erick Luong  YOB: 1955  Date of Service: 4/2/2024  MRN: [id number]   Acct: [id number]   Primary Care Physician: Del Shetty MD  Advance Directive:   Code Status and Medical Interventions:   Ordered at: 03/26/24 1815     Level Of Support Discussed With:    Health Care Surrogate     Code Status (Patient has no pulse and is not breathing):    CPR (Attempt to Resuscitate)     Medical Interventions (Patient has pulse or is breathing):    Full Support     Admit Date: 3/26/2024       Hospital Day: 7  Referring Provider: No ref. provider found      Patient personally seen and examined.  Complete chart including Consults, Notes, Operative Reports, Labs, Cardiology, and Radiology studies reviewed as able.        Subjective:  rEick Luong is a 68 y.o. male for whom we were consulted for evaluation and treatment of cute kidney injury.  He has stage IIIb chronic kidney disease baseline and follows with Dr Lomas in our office.  Baseline creatinine approximately 1.8. He has a history of insulin-dependent type 2 diabetes, hypertension, abdominal obesity, obstructive sleep apnea, diabetic foot ulcer and coronary artery disease.   Patient presented to ER on 3/26 with altered mental status.  He had debridement of ongoing wound on left foot the day prior. Left foot was found to be warm and erythremic on arrival to ER.  He was noted to have elevated blood glucose over 400. Initial creatinine was 1.9.  He was admitted to medical floor for sepsis due to left foot wound. Patient had been agitated and confused during the admission, requiring wrist restraints due to pulling at lines and tubes.      Today, patient appears in his usual mental state of health.  He was up in chair with family visiting.  Still with some peripheral edema but better than my last examination with him.  He denied other complaints upon questioning aside from some  abdominal pain earlier.    Allergies:  Cefepime, Bactrim [sulfamethoxazole-trimethoprim], Vancomycin, Zolpidem, Zolpidem tartrate, and Metronidazole    Home Meds:  Medications Prior to Admission   Medication Sig Dispense Refill Last Dose    amitriptyline (ELAVIL) 25 MG tablet Take 2 tablets by mouth Daily at bedtime. (Patient not taking: Reported on 3/27/2024) 60 tablet 1 Not Taking    apixaban (ELIQUIS) 5 MG tablet tablet Take 1 tablet by mouth Every 12 (Twelve) Hours.       ascorbic acid (VITAMIN C) 1000 MG tablet Take 1 tablet by mouth Daily. 30 tablet 3     Aspirin 81 MG capsule Take 81 mg by mouth Daily.       bumetanide (BUMEX) 1 MG tablet Take 1 tablet every day by oral route for 30 days. 30 tablet 0     bumetanide (BUMEX) 2 MG tablet Take 1 tablet by mouth Daily. Take 2 tablets in morning;1 tablet at night (Patient not taking: Reported on 3/27/2024)   Not Taking    busPIRone (BUSPAR) 10 MG tablet Take 1 tablet by mouth 3 (Three) Times a Day.       calcitriol (ROCALTROL) 0.5 MCG capsule Take 1 capsule by mouth Daily. 90 capsule 4     calcitriol (ROCALTROL) 0.5 MCG capsule Take 1 capsule every day by oral route for 90 days. (Patient not taking: Reported on 3/27/2024) 90 capsule 4 Not Taking    carvedilol (COREG) 3.125 MG tablet Take 1 tablet twice a day by oral route. 60 tablet 4     carvedilol (COREG) 6.25 MG tablet Take 1 tablet by mouth 2 (Two) Times a Day. (Patient not taking: Reported on 3/27/2024) 180 tablet 4 Not Taking    Cholecalciferol (D-5000) 125 MCG (5000 UT) tablet Take 1 tablet by mouth Daily Before Lunch. 30 tablet 2     cloNIDine (CATAPRES) 0.1 MG tablet Take 1 tablet by mouth Every 12 (Twelve) Hours. (Patient not taking: Reported on 3/27/2024) 60 tablet 2 Not Taking    Diclofenac Sodium (VOLTAREN) 1 % gel gel Apply 2 g topically to the appropriate area as directed 4 (Four) Times a Day As Needed. 300 g 11     Diclofenac Sodium (VOLTAREN) 1 % gel gel Apply 2 g topically to the appropriate area  as directed 4 (Four) Times a Day. (Patient not taking: Reported on 3/27/2024) 300 g 11 Not Taking    donepezil (ARICEPT) 10 MG tablet Take 1 tablet by mouth Daily. (Patient not taking: Reported on 3/27/2024) 90 tablet 4 Not Taking    Dulaglutide (Trulicity) 4.5 MG/0.5ML solution pen-injector Inject 0.5 mL under the skin into the appropriate area as directed 1 (One) Time Per Week. (Patient not taking: Reported on 3/27/2024) 2 mL 11 Not Taking    DULoxetine (CYMBALTA) 60 MG capsule Take 1 capsule by mouth Daily. 90 capsule 4     DULoxetine (CYMBALTA) 60 MG capsule Take 1 capsule by mouth Daily. (Patient not taking: Reported on 3/27/2024) 90 capsule 4 Not Taking    DULoxetine (CYMBALTA) 60 MG capsule Take 1 capsule by mouth Daily. (Patient not taking: Reported on 3/27/2024) 90 capsule 4 Not Taking    empagliflozin (JARDIANCE) 25 MG tablet tablet Take 1 tablet by mouth Daily. (Patient not taking: Reported on 3/27/2024) 30 tablet 2 Not Taking    famotidine (PEPCID) 20 MG tablet Take 1 tablet twice a day by oral route. 180 tablet 4     fluticasone (FLONASE) 50 MCG/ACT nasal spray 1 spray into the nostril(s) as directed by provider Daily. (Patient taking differently: 1 spray into the nostril(s) as directed by provider 2 (Two) Times a Day.) 16 g 1     HYDROcodone-acetaminophen (NORCO) 7.5-325 MG per tablet Take 1 tablet by mouth 2 (Two) Times a Day As Needed. (Patient not taking: Reported on 3/27/2024) 40 tablet 0 Not Taking    Insulin Glargine (Lantus SoloStar) 100 UNIT/ML injection pen Inject 20 Units under the skin into the appropriate area as directed Every Evening. 15 mL 3     Insulin Regular Human, Conc, (HumuLIN R U-500 KwikPen) 500 UNIT/ML solution pen-injector CONCENTRATED injection Inject 120 Units under the skin into the appropriate area as directed 2 (Two) Times a Day with breakfast and dinner. (Patient not taking: Reported on 3/27/2024) 18 mL 5 Not Taking    Insulin Regular Human, Conc, (HumuLIN R U-500  KwikPen) 500 UNIT/ML solution pen-injector CONCENTRATED injection Inject 40 Units under the skin into the appropriate area with regular meals AND 40 Units with large meals. 54 mL 3     isosorbide mononitrate (IMDUR) 30 MG 24 hr tablet Take 1 tablet by mouth Daily.       magnesium hydroxide (Milk of Magnesia) 400 MG/5ML suspension Take 30 mL every day by oral route for 30 days. 900 mL 0     magnesium hydroxide (Milk of Magnesia) 400 MG/5ML suspension Take 30mL by mouth once daily for 30 days. (Patient not taking: Reported on 3/27/2024) 900 mL 0 Not Taking    melatonin 3 MG tablet Take 1 tablet by mouth At Night As Needed for Sleep.       methylcellulose (Citrucel) oral powder Mix 5g as directed and drink twice daily for 90 days. 900 g 10     metoclopramide (REGLAN) 5 MG tablet Take 1 tablet by mouth 3 times a day.       midodrine (PROAMATINE) 2.5 MG tablet Take 1 tablet by mouth 3 (Three) Times a Day Before Meals.       nitroglycerin (NITROSTAT) 0.4 MG SL tablet Dissolve 1 tablet by mouth every 5 minutes as needed for chest pain; MAX 3 tabs/24 hours.  If no improvement after 3 tabs go to ER 25 tablet 0     ondansetron (ZOFRAN) 4 MG tablet Take 1 tablet by mouth Every 6 (Six) Hours As Needed for Nausea or Vomiting.       oxyCODONE (ROXICODONE) 10 MG tablet Take 1 tablet by mouth twice a day as needed 60 tablet 0     pantoprazole (Protonix) 40 MG EC tablet Take 1 tablet by mouth 2 (Two) Times a Day. (Patient not taking: Reported on 3/27/2024) 180 tablet 4 Not Taking    polyethylene glycol (MIRALAX) 17 g packet Take 17 g by mouth Daily. Obtain OTC       prazosin (MINIPRESS) 1 MG capsule Take 1 capsule by mouth every night at bedtime. 30 capsule 2     pregabalin (LYRICA) 100 MG capsule Take 2 capsules by mouth Every Night.       pregabalin (LYRICA) 50 MG capsule Take 1 capsule by mouth Daily.       rosuvastatin (CRESTOR) 10 MG tablet Take 1 tablet by mouth Daily.       sennosides-docusate (PERICOLACE) 8.6-50 MG per  tablet Take 1 tablet by mouth Every Night. Obtain OTC       sennosides-docusate (PERICOLACE) 8.6-50 MG per tablet Take 1 tablet by mouth every night at bedtime. (Patient not taking: Reported on 3/27/2024) 30 tablet 2 Not Taking    sodium hypochlorite (DAKIN'S 1/4 STRENGTH) 0.125 % solution topical solution 0.125% Apply to affected area twice daily (Patient not taking: Reported on 3/27/2024) 473 mL 3 Not Taking    sodium hypochlorite (DAKIN'S 1/4 STRENGTH) 0.125 % solution topical solution 0.125% Apply to affected area twice daily as directed (Patient not taking: Reported on 3/27/2024) 473 mL 5 Not Taking    sodium hypochlorite (DAKIN'S) 0.25 % topical solution Use to wound daily as instructed 473 mL 1     sucralfate (CARAFATE) 1 g tablet Take 1 tablet by mouth 3 times a day.       tamsulosin (FLOMAX) 0.4 MG capsule 24 hr capsule Take 1 capsule by mouth Daily. 90 capsule 1     timolol (TIMOPTIC) 0.5 % ophthalmic solution Administer 1 drop to both eyes 2 (Two) Times a Day.       valsartan (DIOVAN) 40 MG tablet Take 1 tablet by mouth Daily.       zinc sulfate (ZINCATE) 50 MG capsule Take 1 capsule by mouth Daily.          Medicines:  Current Facility-Administered Medications   Medication Dose Route Frequency Provider Last Rate Last Admin    acetaminophen (TYLENOL) tablet 650 mg  650 mg Oral Q4H PRN Raquel Duncan APRN   650 mg at 04/01/24 0446    Or    acetaminophen (TYLENOL) 160 MG/5ML oral solution 650 mg  650 mg Oral Q4H PRN Raquel Duncan APRN   650 mg at 03/27/24 2109    Or    acetaminophen (TYLENOL) suppository 650 mg  650 mg Rectal Q4H PRN Raquel Duncan APRN        apixaban (ELIQUIS) tablet 5 mg  5 mg Oral Q12H Raquel Duncan APRN   5 mg at 04/02/24 2037    ascorbic acid (VITAMIN C) tablet 1,000 mg  1,000 mg Oral Daily Rene Maloney MD   1,000 mg at 04/02/24 0944    aspirin EC tablet 81 mg  81 mg Oral Daily Rene Maloney MD   81 mg at 04/02/24 0946    sennosides-docusate (PERICOLACE)  8.6-50 MG per tablet 1 tablet  1 tablet Oral BID Raquel Duncan APRN   1 tablet at 04/02/24 2037    And    polyethylene glycol (MIRALAX) packet 17 g  17 g Oral Daily PRN Raquel Duncan APRN        And    bisacodyl (DULCOLAX) EC tablet 5 mg  5 mg Oral Daily PRN Raquel Duncan APRN        And    bisacodyl (DULCOLAX) suppository 10 mg  10 mg Rectal Daily PRN Raquel Duncan APRN        calcitriol (ROCALTROL) capsule 0.5 mcg  0.5 mcg Oral Daily Rene Maloney MD   0.5 mcg at 04/02/24 0945    carvedilol (COREG) tablet 3.125 mg  3.125 mg Oral BID With Meals Rene Maloney MD   3.125 mg at 04/02/24 1733    dextrose (D50W) (25 g/50 mL) IV injection 25 g  25 g Intravenous Q15 Min PRN Raquel Duncan APRN        dextrose (GLUTOSE) oral gel 15 g  15 g Oral Q15 Min PRN Raquel Duncan APRN   15 g at 03/27/24 1710    Diclofenac Sodium (VOLTAREN) 1 % gel 2 g  2 g Topical 4x Daily PRN Rene Maloney MD        dicyclomine (BENTYL) capsule 20 mg  20 mg Oral TID PRN Rene Maolney MD   20 mg at 03/31/24 1603    donepezil (ARICEPT) tablet 10 mg  10 mg Oral Daily Raquel Duncan APRN   10 mg at 04/02/24 0947    famotidine (PEPCID) tablet 20 mg  20 mg Oral Daily Rene Maloney MD   20 mg at 04/02/24 0945    glucagon (GLUCAGEN) injection 1 mg  1 mg Intramuscular Q15 Min PRN Raquel Duncan APRN        haloperidol lactate (HALDOL) injection 5 mg  5 mg Intravenous Q6H PRN Rene Maloney MD        insulin detemir (LEVEMIR) injection 30 Units  30 Units Subcutaneous Nightly Raquel Duncan APRN   30 Units at 04/02/24 2049    Insulin Lispro (humaLOG) injection 4-24 Units  4-24 Units Subcutaneous 4x Daily AC & at Bedtime Raquel Duncan APRN   8 Units at 04/02/24 1144    insulin regular (humuLIN R,novoLIN R) injection 10 Units  10 Units Subcutaneous TID AC Steve De Jesus MD   10 Units at 04/02/24 1732    isosorbide mononitrate (IMDUR) 24 hr tablet 30 mg  30 mg Oral Q24H  Rene Maloney MD   30 mg at 04/02/24 0944    Linezolid (ZYVOX) 600 mg 300 mL  600 mg Intravenous Q12H Julia Adrian  mL/hr at 04/02/24 1131 600 mg at 04/02/24 1131    magnesium hydroxide (MILK OF MAGNESIA) suspension 10 mL  10 mL Oral Daily PRN Rene Maloney MD   10 mL at 03/31/24 1202    melatonin tablet 6 mg  6 mg Oral Nightly Rene Maloney MD   6 mg at 04/02/24 2035    midodrine (PROAMATINE) tablet 2.5 mg  2.5 mg Oral TID AC Rene Maloney MD   2.5 mg at 04/02/24 1733    Morphine sulfate (PF) injection 2 mg  2 mg Intravenous Q3H PRN Rene Maloney MD   2 mg at 04/02/24 1129    mupirocin (BACTROBAN) 2 % nasal ointment 1 Application  1 Application Each Nare BID Nadia Polo K, APRN   1 Application at 04/02/24 2038    nitroglycerin (NITROSTAT) SL tablet 0.4 mg  0.4 mg Sublingual Q5 Min PRN Raquel Duncan, APRN        ondansetron ODT (ZOFRAN-ODT) disintegrating tablet 4 mg  4 mg Oral Q6H PRN Raquel Duncan, APRN   4 mg at 04/01/24 1313    Or    ondansetron (ZOFRAN) injection 4 mg  4 mg Intravenous Q6H PRN Raquel Duncan, APRN   4 mg at 03/29/24 1837    oxyCODONE (ROXICODONE) immediate release tablet 10 mg  10 mg Oral BID PRN Raquel Duncan, APRN   10 mg at 04/02/24 2036    pregabalin (LYRICA) capsule 100 mg  100 mg Oral Nightly Rene Maloney MD   100 mg at 04/02/24 2038    pregabalin (LYRICA) capsule 50 mg  50 mg Oral Daily Rene Maloney MD   50 mg at 04/02/24 0942    rosuvastatin (CRESTOR) tablet 10 mg  10 mg Oral Nightly Rene Maloney MD   10 mg at 04/02/24 2037    sodium chloride 0.9 % flush 10 mL  10 mL Intravenous PRN Steve De Jesus MD        sodium chloride 0.9 % flush 10 mL  10 mL Intravenous Q12H Raquel Duncan, APRN   10 mL at 04/02/24 2039    sodium chloride 0.9 % flush 10 mL  10 mL Intravenous PRN Raquel Duncan, APRN        sodium chloride 0.9 % infusion 40 mL  40 mL Intravenous PRN Raquel Duncan, APRN         sucralfate (CARAFATE) 1 GM/10ML suspension 1 g  1 g Oral TID AC Rene Maloney MD   1 g at 04/02/24 1732    tamsulosin (FLOMAX) 24 hr capsule 0.4 mg  0.4 mg Oral Daily Rene Maloney MD   0.4 mg at 04/02/24 0945    timolol (TIMOPTIC) 0.25 % ophthalmic solution 1 drop  1 drop Both Eyes Q12H Rene Maloney MD   1 drop at 04/02/24 2035    vitamin D3 capsule 5,000 Units  5,000 Units Oral Daily Rene Maloney MD   5,000 Units at 04/02/24 0942    zinc sulfate (ZINCATE) capsule 220 mg  220 mg Oral Daily Rene Maloney MD   220 mg at 04/02/24 0947       Past Medical History:  Past Medical History:   Diagnosis Date    Arthritis     Autonomic disease     CAD (coronary artery disease) 02/06/2017    Cervical radiculopathy 09/16/2021    Chronic constipation with acute exaccerbation 05/10/2021    Coronary artery disease     Degeneration of cervical intervertebral disc 08/11/2021    Diabetes mellitus     Diabetic foot ulcer 08/31/2020    Diabetic polyneuropathy associated with type 2 diabetes mellitus 01/18/2021    Elevated cholesterol     Gastroesophageal reflux disease 05/13/2019    Headache     HTN (hypertension), benign 05/03/2017    Hyperlipidemia     Hypertension     Mixed hyperlipidemia 02/07/2017    Multiple lung nodules 01/26/2020    5mm, 9 mm RLL identified 1/2020, not present 10/2019.    Myocardial infarction     Osteomyelitis 01/22/2020    Osteomyelitis of fifth toe of right foot 10/07/2019    Pancreatitis     Persistent insomnia 01/20/2020    Renal disorder     Sleep apnea 02/06/2017    Sleep apnea with use of continuous positive airway pressure (CPAP)     NON-COMPLIANT    Slow transit constipation 01/16/2019    Spinal stenosis in cervical region 09/16/2021    Vitamin D deficiency 03/02/2021       Past Surgical History:  Past Surgical History:   Procedure Laterality Date    ABDOMINAL SURGERY      AMPUTATION FOOT / TOE Left 10/2021    5th digit     ANTERIOR CERVICAL DISCECTOMY W/ FUSION N/A  2022    Procedure: CERVICAL DISCECTOMY ANTERIOR WITH FUSION C5-6 with possible plating of C5-7 with neuromonitoring and 1 c-arm;  Surgeon: Karel Soliz MD;  Location:  [facility] OR;  Service: Neurosurgery;  Laterality: N/A;    APPENDECTOMY      BACK SURGERY      CARDIAC CATHETERIZATION Left 2021    Procedure: Left Heart Cath w poss intervention left anatomical snuff box acess;  Surgeon: Omkar Charles DO;  Location:  [facility] CATH INVASIVE LOCATION;  Service: Cardiology;  Laterality: Left;    CARDIAC SURGERY      CATARACT EXTRACTION      CERVICAL SPINE SURGERY      COLONOSCOPY N/A 2017    Normal exam repeat in 5 years    COLONOSCOPY N/A 2019    Mild acute inflammation    COLONOSCOPY W/ POLYPECTOMY  2014    Hyperplastic polyp    CORONARY ARTERY BYPASS GRAFT  10/2015    ENDOSCOPY  2011    Gastritis with hemorrhage    ENDOSCOPY N/A 2017    Normal exam    ENDOSCOPY N/A 2019    Gastritis    ENDOSCOPY N/A 2020    Non-erosive gastritis with hemorrhage    ENDOSCOPY N/A 02/10/2021    Esophagitis    FOOT SURGERY Left     INCISION AND DRAINAGE OF WOUND Left 2007    spider bite       Family History  Family History   Problem Relation Age of Onset    Colon cancer Father     Heart disease Father     Colon cancer Sister     Colon polyps Sister     Alzheimer's disease Mother     Coronary artery disease Sister     Coronary artery disease Sister        Social History  Social History     Socioeconomic History    Marital status:    Tobacco Use    Smoking status: Former     Current packs/day: 0.00     Types: Cigarettes     Quit date:      Years since quittin.2    Smokeless tobacco: Never    Tobacco comments:     smoked in highschool   Vaping Use    Vaping status: Never Used   Substance and Sexual Activity    Alcohol use: No    Drug use: No    Sexual activity: Defer       Review of Systems:  History obtained from chart review and the patient  General ROS: No  "fever or chills  Respiratory ROS: No cough, shortness of breath, wheezing  Cardiovascular ROS: No chest pain or palpitations  Gastrointestinal ROS: No nausea or vomiting  Genito-Urinary ROS: No dysuria or hematuria  Psych ROS: No anxiety and depression  14 point ROS reviewed with the patient and negative except as noted above and in the HPI unless unable to obtain.    Objective:  Patient Vitals for the past 24 hrs:   BP Temp Temp src Pulse Resp SpO2 Height Weight   04/02/24 1957 122/58 97.8 °F (36.6 °C) Oral 56 18 99 % -- --   04/02/24 1554 157/78 97.2 °F (36.2 °C) Oral 67 18 97 % -- --   04/02/24 1206 138/64 96.7 °F (35.9 °C) Oral 64 16 100 % -- --   04/02/24 1030 126/68 -- -- -- -- -- 182.9 cm (72\") 133 kg (294 lb)   04/02/24 0700 126/68 96.8 °F (36 °C) Oral 60 16 100 % -- --   04/02/24 0557 -- -- -- -- -- -- -- 134 kg (294 lb 12.8 oz)   04/02/24 0300 132/69 97.4 °F (36.3 °C) Oral 62 16 100 % -- --   04/02/24 0016 122/46 97.4 °F (36.3 °C) Oral 63 16 97 % -- --       Intake/Output Summary (Last 24 hours) at 4/2/2024 2107  Last data filed at 4/2/2024 1900  Gross per 24 hour   Intake --   Output 1175 ml   Net -1175 ml     General: awake/alert   Chest:  clear to auscultation bilaterally without respiratory distress  CVS: regular rate and rhythm  Abdominal: soft, nontender, positive bowel sounds  Extremities: ble edema  Skin: warm and dry without rash      Labs:  Results from last 7 days   Lab Units 04/02/24  0617 04/01/24  0402 03/31/24  0526   WBC 10*3/mm3 7.59 5.15 5.01   HEMOGLOBIN g/dL 10.8* 9.9* 10.3*   HEMATOCRIT % 34.0* 30.6* 31.6*   PLATELETS 10*3/mm3 197 162 149         Results from last 7 days   Lab Units 04/02/24  0656 04/01/24  0402 03/31/24  0526 03/30/24  0620 03/29/24  0551   SODIUM mmol/L 139 135* 136 135* 138   POTASSIUM mmol/L 4.1 4.1 3.6 3.5 3.3*   CHLORIDE mmol/L 104 101 101 100 103   CO2 mmol/L 26.0 23.0 23.0 24.0 23.0   BUN mg/dL 43* 46* 51* 51* 40*   CREATININE mg/dL 2.02* 2.18* 2.52* 2.74* " 2.07*   CALCIUM mg/dL 9.8 9.1 8.6 8.2* 8.4*   EGFR mL/min/1.73 35.5* 32.4* 27.2* 24.6* 34.5*   BILIRUBIN mg/dL  --   --  0.5 0.5 0.7   ALK PHOS U/L  --   --  110 95 81   ALT (SGPT) U/L  --   --  19 19 17   AST (SGOT) U/L  --   --  30 32 32   GLUCOSE mg/dL 106* 292* 201* 104* 120*       Radiology:   Imaging Results (Last 72 Hours)       ** No results found for the last 72 hours. **            Culture:  Blood Culture   Date Value Ref Range Status   04/01/2024 No growth at 24 hours  Preliminary   03/30/2024 No growth at 3 days  Preliminary   03/29/2024 Staphylococcus aureus, MRSA (C)  Final     Comment:       Methicillin resistant Staphylococcus aureus, Patient may be an isolation risk.  Infectious disease consultation is highly recommended to rule out distant foci of infection.         Assessment   Acute kidney injury  Chronic kidney disease stage IIIb  Diabetes type 2 with diabetic nephropathy  Hypertension  Hypokalemia  Anemia and chronic kidney disease  MRSA bacteremia     Plan:  Discussed with patient, nursing  Workup reviewed today  ID and podiatry evaluations reviewed  Holding Bumex for now-likely can restart tomorrow  Antibiotics per ID recommendations        Willy Arteaga MD  4/2/2024  21:07 CDT

## 2024-04-03 NOTE — THERAPY TREATMENT NOTE
Patient Name: Erick Luong  : 1955    MRN: [id number]                              Today's Date: 4/3/2024       Admit Date: 3/26/2024    Visit Dx:     ICD-10-CM ICD-9-CM   1. Diabetic foot infection  E11.628 250.80    L08.9 686.9   2. Poorly controlled diabetes mellitus  E11.65 250.00   3. Impaired functional mobility and activity tolerance [Z74.09]  Z74.09 V49.89     Patient Active Problem List   Diagnosis    Obesity, unspecified obesity severity, unspecified obesity type    Essential hypertension    Type 2 diabetes mellitus with hyperglycemia, with long-term current use of insulin    Nonsmoker    Anemia due to chronic kidney disease    Class 3 severe obesity due to excess calories with body mass index (BMI) of 40.0 to 44.9 in adult    Anasarca    Sleep apnea with use of continuous positive airway pressure (CPAP)    Medically noncompliant    Diabetic ulcer of left midfoot associated with type 2 diabetes mellitus, with fat layer exposed    Diabetic polyneuropathy associated with type 2 diabetes mellitus    Spinal stenosis in cervical region    Degeneration of cervical intervertebral disc    Cervical radiculopathy    Degeneration of lumbar or lumbosacral intervertebral disc    Cervical myelopathy    Bilateral carpal tunnel syndrome    CAD (coronary artery disease)    GERD without esophagitis    BPH without obstruction/lower urinary tract symptoms    Stage 3b chronic kidney disease    Chronic diastolic heart failure    Type 2 myocardial infarction due to heart failure    Left carpal tunnel syndrome    Syncope and collapse, recurrent episodes    Poorly-controlled hypertension    Rhinovirus    Peripheral vascular disease    Chronic kidney disease (CKD), stage IV (severe)    Diabetic foot infection    Sepsis     Past Medical History:   Diagnosis Date    Arthritis     Autonomic disease     CAD (coronary artery disease) 2017    Cervical radiculopathy 2021    Chronic constipation with acute  exaccerbation 05/10/2021    Coronary artery disease     Degeneration of cervical intervertebral disc 08/11/2021    Diabetes mellitus     Diabetic foot ulcer 08/31/2020    Diabetic polyneuropathy associated with type 2 diabetes mellitus 01/18/2021    Elevated cholesterol     Gastroesophageal reflux disease 05/13/2019    Headache     HTN (hypertension), benign 05/03/2017    Hyperlipidemia     Hypertension     Mixed hyperlipidemia 02/07/2017    Multiple lung nodules 01/26/2020    5mm, 9 mm RLL identified 1/2020, not present 10/2019.    Myocardial infarction     Osteomyelitis 01/22/2020    Osteomyelitis of fifth toe of right foot 10/07/2019    Pancreatitis     Persistent insomnia 01/20/2020    Renal disorder     Sleep apnea 02/06/2017    Sleep apnea with use of continuous positive airway pressure (CPAP)     NON-COMPLIANT    Slow transit constipation 01/16/2019    Spinal stenosis in cervical region 09/16/2021    Vitamin D deficiency 03/02/2021     Past Surgical History:   Procedure Laterality Date    ABDOMINAL SURGERY      AMPUTATION FOOT / TOE Left 10/2021    5th digit     ANTERIOR CERVICAL DISCECTOMY W/ FUSION N/A 8/5/2022    Procedure: CERVICAL DISCECTOMY ANTERIOR WITH FUSION C5-6 with possible plating of C5-7 with neuromonitoring and 1 c-arm;  Surgeon: Karel Soliz MD;  Location:  [facility] OR;  Service: Neurosurgery;  Laterality: N/A;    APPENDECTOMY      BACK SURGERY      CARDIAC CATHETERIZATION Left 02/08/2021    Procedure: Left Heart Cath w poss intervention left anatomical snuff box acess;  Surgeon: Omkar Chalres DO;  Location:  [facility]ATH [facility]AS[facility]ATION;  Service: Cardiology;  Laterality: Left;    CARDIAC SURGERY      CATARACT EXTRACTION      CERVICAL SPINE SURGERY      COLONOSCOPY N/A 01/31/2017    Normal exam repeat in 5 years    COLONOSCOPY N/A 02/11/2019    Mild acute inflammation    COLONOSCOPY W/ POLYPECTOMY  03/04/2014    Hyperplastic polyp    CORONARY ARTERY BYPASS GRAFT  10/2015     ENDOSCOPY  04/13/2011    Gastritis with hemorrhage    ENDOSCOPY N/A 05/05/2017    Normal exam    ENDOSCOPY N/A 02/11/2019    Gastritis    ENDOSCOPY N/A 09/01/2020    Non-erosive gastritis with hemorrhage    ENDOSCOPY N/A 02/10/2021    Esophagitis    FOOT SURGERY Left     INCISION AND DRAINAGE OF WOUND Left 09/2007    spider bite      General Information       Row Name 04/03/24 1038          OT Time and Intention    Document Type therapy note (daily note)  -LR     Mode of Treatment occupational therapy  -       Row Name 04/03/24 1038          General Information    Patient Profile Reviewed yes  -LR     Existing Precautions/Restrictions fall  WBAT L LE with CAM boot  -LR               User Key  (r) = Recorded By, (t) = Taken By, (c) = Cosigned By      Initials Name Provider Type    LR Tabitha Webb, OTR/L Occupational Therapist                     Mobility/ADL's       Row Name 04/03/24 1038          Bed Mobility    Comment, (Bed Mobility) sitting in chair  -       Row Name 04/03/24 1038          Transfers    Transfers sit-stand transfer;stand-sit transfer;toilet transfer  -LR     Comment, (Transfers) impulsivity noted  -       Row Name 04/03/24 1038          Sit-Stand Transfer    Sit-Stand [place] (Transfers) contact guard;verbal cues  -LR     Assistive Device (Sit-Stand Transfers) walker, front-wheeled  -LR       Row Name 04/03/24 1038          Stand-Sit Transfer    Stand-Sit [place] (Transfers) contact guard;verbal cues  -LR     Assistive Device (Stand-Sit Transfers) walker, front-wheeled  -LR       Row Name 04/03/24 1038          Toilet Transfer    Type (Toilet Transfer) sit-stand;stand-sit  -LR     [place] Level (Toilet Transfer) contact guard;verbal cues  -LR     Assistive Device (Toilet Transfer) commode, bedside without drop arms;walker, front-wheeled  -LR       Row Name 04/03/24 1038          Functional Mobility    Functional Mobility- Ind. Level contact guard assist  -LR      Functional Mobility- Device walker, front-wheeled  -LR       Row Name 04/03/24 1038          Activities of Daily Living    BADL Assessment/Intervention lower body dressing;grooming;toileting  -LR       Row Name 04/03/24 1038          Toileting Assessment/Training    [place] Level (Toileting) adjust/manage clothing;contact guard assist  -LR     Assistive Devices (Toileting) commode  -LR     Position (Toileting) unsupported standing  -LR       Row Name 04/03/24 1038          Lower Body Dressing Assessment/Training    [place] Level (Lower Body Dressing) doff;don;socks;dependent (less than 25% patient effort);verbal cues  -LR     Position (Lower Body Dressing) supported sitting  -LR       Row Name 04/03/24 1038          Grooming Assessment/Training    [place] Level (Grooming) oral care regimen;wash face, hands;contact guard assist;set up;verbal cues  -LR     Position (Grooming) sink side;supported standing;unsupported standing  -LR     Comment, (Grooming) posterior LOB while standing unsupported at sink  -LR               User Key  (r) = Recorded By, (t) = Taken By, (c) = Cosigned By      Initials Name Provider Type    Tabitha Marquez, OTR/L Occupational Therapist                   Obj/Interventions       Row Name 04/03/24 1038          Balance    Balance Assessment sitting static balance;sitting dynamic balance;standing static balance;standing dynamic balance  -LR     Static Sitting Balance supervision  -LR     Dynamic Sitting Balance standby assist  -LR     Position, Sitting Balance supported;sitting in chair  -LR     Static Standing Balance contact guard  -LR     Dynamic Standing Balance contact guard  -LR     Position/Device Used, Standing Balance supported;walker, front-wheeled  -LR     Comment, Balance posterior LOB noted while unsupported standing sink-side  -LR               User Key  (r) = Recorded By, (t) = Taken By, (c) = Cosigned By      Initials Name Provider Type    SUMA Webb  Tabitha, OTR/L Occupational Therapist                   Goals/Plan    No documentation.                  Clinical Impression       Row Name 04/03/24 1038          Pain Assessment    Pretreatment Pain Rating 8/10  -LR     Posttreatment Pain Rating 8/10  -LR     Pain Location - abdomen  -LR     Pain Intervention(s) Medication (See MAR);Repositioned;Ambulation/increased activity;Nursing Notified  -LR       Row Name 04/03/24 1038          Plan of Care Review    Plan of Care Reviewed With patient  -LR     Progress no change  -LR     Outcome Evaluation OT tx completed. Pt chair alarm sounding, pt attempting to stand without assist. Pt educated on need for assist and use of call light. Pt with L CAM boot donned. Pt completed sit<>stand t/f at chair and BSC with CGA and cues required for hand placement. Pt completed fxl mobility using FWW with CGA required. Toileting completed while unsupported standing, requiring cues for proximity to commode. Pt completed grooming while standing sink-side, demo 1 posterior LOB that required CGA and UE support for correction. Pt required seated RB on BSC d/t c/o fatigue. R sock doffed/donned with Dep A required, pt refused attempt. Pt slow to respond at times during conversation and impulsive during tasks. Pt c/o 8/10 abdominal pain, nsg notified. Continue OT POC in order to increase pt safety and I during ADLs, fxl mobility, and fxl t/f's.  -LR       Row Name 04/03/24 1038          Vital Signs    O2 Delivery Pre Treatment room air  -LR     O2 Delivery Intra Treatment room air  -LR     O2 Delivery Post Treatment room air  -LR     Pre Patient Position Sitting  -LR     Intra Patient Position Standing  -LR     Post Patient Position Sitting  -LR       Row Name 04/03/24 1038          Positioning and Restraints    Pre-Treatment Position sitting in chair/recliner  -LR     Post Treatment Position chair  -LR     In Chair notified nsg;reclined;call light within reach;encouraged to call for  assist;exit alarm on  -LR               User Key  (r) = Recorded By, (t) = Taken By, (c) = Cosigned By      Initials Name Provider Type    Tabitha Marquez OTR/L Occupational Therapist                   Outcome Measures       Row Name 04/03/24 1038          How much help from another is currently needed...    Putting on and taking off regular lower body clothing? 2  -LR     Bathing (including washing, rinsing, and drying) 2  -LR     Toileting (which includes using toilet bed pan or urinal) 3  -LR     Putting on and taking off regular upper body clothing 3  -LR     Taking care of personal grooming (such as brushing teeth) 3  -LR     Eating meals 4  -LR     AM-PAC 6 Clicks Score (OT) 17  -LR       Row Name 04/03/24 0800 04/03/24 0714       How much help from another person do you currently need...    Turning from your back to your side while in flat bed without using bedrails? 3  -AH 4  -CM    Moving from lying on back to sitting on the side of a flat bed without bedrails? 3  -AH 3  -CM    Moving to and from a bed to a chair (including a wheelchair)? 3  -AH 3  -CM    Standing up from a chair using your arms (e.g., wheelchair, bedside chair)? 3  -AH 3  -CM    Climbing 3-5 steps with a railing? 2  -AH 2  -CM    To walk in hospital room? 3  -AH 3  -CM    AM-PAC 6 Clicks Score (PT) 17  -AH 18  -CM    Highest Level of Mobility Goal 5 --> Static standing  -AH 6 --> Walk 10 steps or more  -CM      Row Name 04/03/24 1038 04/03/24 0800       Functional Assessment    Outcome Measure Options AM-PAC 6 Clicks Daily Activity (OT)  -LR AM-PAC 6 Clicks Basic Mobility (PT)  -AH              User Key  (r) = Recorded By, (t) = Taken By, (c) = Cosigned By      Initials Name Provider Type    Chery Cedeno PTA Physical Therapist Assistant    Yara Fontana, RN Registered Nurse    Tabitha Marquez OTR/L Occupational Therapist                    Occupational Therapy Education       Title: PT OT SLP Therapies (In  Progress)       Topic: Occupational Therapy (In Progress)       Point: ADL training (Done)       Description:   Instruct learner(s) on proper safety adaptation and remediation techniques during self care or transfers.   Instruct in proper use of assistive devices.                  Learning Progress Summary             Patient Acceptance, E,D, VU,NR by LR at 4/3/2024 1119    Acceptance, E, VU,NR by LS at 4/2/2024 1128    Acceptance, E, VU,NR by  at 3/29/2024 1424    Comment: Role of OT, OT POC, fall prevention, benefits of ambulation, d/c recommendations                         Point: Home exercise program (Not Started)       Description:   Instruct learner(s) on appropriate technique for monitoring, assisting and/or progressing therapeutic exercises/activities.                  Learner Progress:  Not documented in this visit.              Point: Precautions (Done)       Description:   Instruct learner(s) on prescribed precautions during self-care and functional transfers.                  Learning Progress Summary             Patient Acceptance, E,D, VU,NR by LR at 4/3/2024 1119    Acceptance, E, VU,NR by  at 4/2/2024 1128    Acceptance, E, VU,NR by  at 3/29/2024 1424    Comment: Role of OT, OT POC, fall prevention, benefits of ambulation, d/c recommendations                         Point: Body mechanics (Done)       Description:   Instruct learner(s) on proper positioning and spine alignment during self-care, functional mobility activities and/or exercises.                  Learning Progress Summary             Patient Acceptance, E,D, VU,NR by LR at 4/3/2024 1119    Acceptance, E, VU,NR by  at 4/2/2024 1128    Acceptance, E, VU,NR by  at 3/29/2024 1424    Comment: Role of OT, OT POC, fall prevention, benefits of ambulation, d/c recommendations                                         User Key       Initials Effective Dates Name Provider Type Discipline     06/20/22 -  Alesha Barrett OTR/EDVIN Occupational  Therapist OT    LR 04/25/23 -  Tabitha Webb, OTR/L Occupational Therapist OT     01/09/24 -  Yulia Pritchett OT Student OT Student OT                  OT Recommendation and Plan     Plan of Care Review  Plan of Care Reviewed With: patient  Progress: no change  Outcome Evaluation: OT tx completed. Pt chair alarm sounding, pt attempting to stand without assist. Pt educated on need for assist and use of call light. Pt with L CAM boot donned. Pt completed sit<>stand t/f at chair and BSC with CGA and cues required for hand placement. Pt completed fxl mobility using FWW with CGA required. Toileting completed while unsupported standing, requiring cues for proximity to commode. Pt completed grooming while standing sink-side, demo 1 posterior LOB that required CGA and UE support for correction. Pt required seated RB on BSC d/t c/o fatigue. R sock doffed/donned with Dep A required, pt refused attempt. Pt slow to respond at times during conversation and impulsive during tasks. Pt c/o 8/10 abdominal pain, nsg notified. Continue OT POC in order to increase pt safety and I during ADLs, fxl mobility, and fxl t/f's.     Time Calculation:         Time Calculation- OT       Row Name 04/03/24 1038 04/03/24 0802          Time Calculation- OT    OT Start Time 1038  -LR --     OT Stop Time 1116  -LR --     OT Time Calculation (min) 38 min  -LR --     Total Timed Code Minutes- OT 38 minute(s)  -LR --     OT Received On 04/03/24  -LR --        Timed Charges    52101 - Gait Training Minutes  -- 13  -AH     56090 - OT Self Care/Mgmt Minutes 38  -LR --        Total Minutes    Timed Charges Total Minutes 38  -LR 13  -AH      Total Minutes 38  -LR 13  -AH               User Key  (r) = Recorded By, (t) = Taken By, (c) = Cosigned By      Initials Name Provider Type    Chery Cedeno, PTA Physical Therapist Assistant    LR Tabitha Webb, OTR/L Occupational Therapist                  Therapy Charges for Today       Code  Description Service Date Service Provider Modifiers Qty    [id number] HC OT SELF CARE/MGMT/TRAIN EA 15 MIN 4/3/2024 Tabitha Webb, JENR/L GO 3                 Tabitha Webb OTR/L  4/3/2024

## 2024-04-03 NOTE — PROGRESS NOTES
Nephrology ([facility]) Progress Note      Patient:  Erick Luong  YOB: 1955  Date of Service: 4/3/2024  MRN: [id number]   Acct: [id number]   Primary Care Physician: Del Shetty MD  Advance Directive:   Code Status and Medical Interventions:   Ordered at: 03/26/24 1815     Level Of Support Discussed With:    Health Care Surrogate     Code Status (Patient has no pulse and is not breathing):    CPR (Attempt to Resuscitate)     Medical Interventions (Patient has pulse or is breathing):    Full Support     Admit Date: 3/26/2024       Hospital Day: 8  Referring Provider: No ref. provider found      Patient personally seen and examined.  Complete chart including Consults, Notes, Operative Reports, Labs, Cardiology, and Radiology studies reviewed as able.        Subjective:  Erick Luong is a 68 y.o. male for whom we were consulted for evaluation and treatment of acute kidney injury.  He has stage IIIb chronic kidney disease baseline, follows with Dr Lomas in our office.  Baseline creatinine approximately 1.8. He has history of insulin-dependent type 2 diabetes, hypertension, abdominal obesity, obstructive sleep apnea, diabetic foot ulcer and coronary artery disease.   Patient presented to ER on 3/26 with altered mental status. Had debridement of ongoing wound on left foot the day prior. Left foot warm and erythremic on arrival to ER. Noted to have elevated blood glucose over 400. Initial creatinine of 1.9.  Admitted to medical floor for sepsis due to left foot wound. Patient has been agitated and confused during the admission, requiring wrist restraints due to pulling at lines and tubes. Renal function and mentation have been improving.    Today is awake and alert. No new complaint. Did receive Haldol once overnight for restlessness. Urine output nonoliguric     Allergies:  Cefepime, Bactrim [sulfamethoxazole-trimethoprim], Vancomycin, Zolpidem, Zolpidem tartrate, and  Metronidazole    Home Meds:  Medications Prior to Admission   Medication Sig Dispense Refill Last Dose    amitriptyline (ELAVIL) 25 MG tablet Take 2 tablets by mouth Daily at bedtime. (Patient not taking: Reported on 3/27/2024) 60 tablet 1 Not Taking    apixaban (ELIQUIS) 5 MG tablet tablet Take 1 tablet by mouth Every 12 (Twelve) Hours.       ascorbic acid (VITAMIN C) 1000 MG tablet Take 1 tablet by mouth Daily. 30 tablet 3     Aspirin 81 MG capsule Take 81 mg by mouth Daily.       bumetanide (BUMEX) 1 MG tablet Take 1 tablet every day by oral route for 30 days. 30 tablet 0     bumetanide (BUMEX) 2 MG tablet Take 1 tablet by mouth Daily. Take 2 tablets in morning;1 tablet at night (Patient not taking: Reported on 3/27/2024)   Not Taking    busPIRone (BUSPAR) 10 MG tablet Take 1 tablet by mouth 3 (Three) Times a Day.       calcitriol (ROCALTROL) 0.5 MCG capsule Take 1 capsule by mouth Daily. 90 capsule 4     calcitriol (ROCALTROL) 0.5 MCG capsule Take 1 capsule every day by oral route for 90 days. (Patient not taking: Reported on 3/27/2024) 90 capsule 4 Not Taking    carvedilol (COREG) 3.125 MG tablet Take 1 tablet twice a day by oral route. 60 tablet 4     carvedilol (COREG) 6.25 MG tablet Take 1 tablet by mouth 2 (Two) Times a Day. (Patient not taking: Reported on 3/27/2024) 180 tablet 4 Not Taking    Cholecalciferol (D-5000) 125 MCG (5000 UT) tablet Take 1 tablet by mouth Daily Before Lunch. 30 tablet 2     cloNIDine (CATAPRES) 0.1 MG tablet Take 1 tablet by mouth Every 12 (Twelve) Hours. (Patient not taking: Reported on 3/27/2024) 60 tablet 2 Not Taking    Diclofenac Sodium (VOLTAREN) 1 % gel gel Apply 2 g topically to the appropriate area as directed 4 (Four) Times a Day As Needed. 300 g 11     Diclofenac Sodium (VOLTAREN) 1 % gel gel Apply 2 g topically to the appropriate area as directed 4 (Four) Times a Day. (Patient not taking: Reported on 3/27/2024) 300 g 11 Not Taking    donepezil (ARICEPT) 10 MG tablet  Take 1 tablet by mouth Daily. (Patient not taking: Reported on 3/27/2024) 90 tablet 4 Not Taking    Dulaglutide (Trulicity) 4.5 MG/0.5ML solution pen-injector Inject 0.5 mL under the skin into the appropriate area as directed 1 (One) Time Per Week. (Patient not taking: Reported on 3/27/2024) 2 mL 11 Not Taking    DULoxetine (CYMBALTA) 60 MG capsule Take 1 capsule by mouth Daily. 90 capsule 4     DULoxetine (CYMBALTA) 60 MG capsule Take 1 capsule by mouth Daily. (Patient not taking: Reported on 3/27/2024) 90 capsule 4 Not Taking    DULoxetine (CYMBALTA) 60 MG capsule Take 1 capsule by mouth Daily. (Patient not taking: Reported on 3/27/2024) 90 capsule 4 Not Taking    empagliflozin (JARDIANCE) 25 MG tablet tablet Take 1 tablet by mouth Daily. (Patient not taking: Reported on 3/27/2024) 30 tablet 2 Not Taking    famotidine (PEPCID) 20 MG tablet Take 1 tablet twice a day by oral route. 180 tablet 4     fluticasone (FLONASE) 50 MCG/ACT nasal spray 1 spray into the nostril(s) as directed by provider Daily. (Patient taking differently: 1 spray into the nostril(s) as directed by provider 2 (Two) Times a Day.) 16 g 1     HYDROcodone-acetaminophen (NORCO) 7.5-325 MG per tablet Take 1 tablet by mouth 2 (Two) Times a Day As Needed. (Patient not taking: Reported on 3/27/2024) 40 tablet 0 Not Taking    Insulin Glargine (Lantus SoloStar) 100 UNIT/ML injection pen Inject 20 Units under the skin into the appropriate area as directed Every Evening. 15 mL 3     Insulin Regular Human, Conc, (HumuLIN R U-500 KwikPen) 500 UNIT/ML solution pen-injector CONCENTRATED injection Inject 120 Units under the skin into the appropriate area as directed 2 (Two) Times a Day with breakfast and dinner. (Patient not taking: Reported on 3/27/2024) 18 mL 5 Not Taking    Insulin Regular Human, Conc, (HumuLIN R U-500 KwikPen) 500 UNIT/ML solution pen-injector CONCENTRATED injection Inject 40 Units under the skin into the appropriate area with regular meals  AND 40 Units with large meals. 54 mL 3     isosorbide mononitrate (IMDUR) 30 MG 24 hr tablet Take 1 tablet by mouth Daily.       magnesium hydroxide (Milk of Magnesia) 400 MG/5ML suspension Take 30 mL every day by oral route for 30 days. 900 mL 0     magnesium hydroxide (Milk of Magnesia) 400 MG/5ML suspension Take 30mL by mouth once daily for 30 days. (Patient not taking: Reported on 3/27/2024) 900 mL 0 Not Taking    melatonin 3 MG tablet Take 1 tablet by mouth At Night As Needed for Sleep.       methylcellulose (Citrucel) oral powder Mix 5g as directed and drink twice daily for 90 days. 900 g 10     metoclopramide (REGLAN) 5 MG tablet Take 1 tablet by mouth 3 times a day.       midodrine (PROAMATINE) 2.5 MG tablet Take 1 tablet by mouth 3 (Three) Times a Day Before Meals.       nitroglycerin (NITROSTAT) 0.4 MG SL tablet Dissolve 1 tablet by mouth every 5 minutes as needed for chest pain; MAX 3 tabs/24 hours.  If no improvement after 3 tabs go to ER 25 tablet 0     ondansetron (ZOFRAN) 4 MG tablet Take 1 tablet by mouth Every 6 (Six) Hours As Needed for Nausea or Vomiting.       oxyCODONE (ROXICODONE) 10 MG tablet Take 1 tablet by mouth twice a day as needed 60 tablet 0     pantoprazole (Protonix) 40 MG EC tablet Take 1 tablet by mouth 2 (Two) Times a Day. (Patient not taking: Reported on 3/27/2024) 180 tablet 4 Not Taking    polyethylene glycol (MIRALAX) 17 g packet Take 17 g by mouth Daily. Obtain OTC       prazosin (MINIPRESS) 1 MG capsule Take 1 capsule by mouth every night at bedtime. 30 capsule 2     pregabalin (LYRICA) 100 MG capsule Take 2 capsules by mouth Every Night.       pregabalin (LYRICA) 50 MG capsule Take 1 capsule by mouth Daily.       rosuvastatin (CRESTOR) 10 MG tablet Take 1 tablet by mouth Daily.       sennosides-docusate (PERICOLACE) 8.6-50 MG per tablet Take 1 tablet by mouth Every Night. Obtain OTC       sennosides-docusate (PERICOLACE) 8.6-50 MG per tablet Take 1 tablet by mouth every  night at bedtime. (Patient not taking: Reported on 3/27/2024) 30 tablet 2 Not Taking    sodium hypochlorite (DAKIN'S 1/4 STRENGTH) 0.125 % solution topical solution 0.125% Apply to affected area twice daily (Patient not taking: Reported on 3/27/2024) 473 mL 3 Not Taking    sodium hypochlorite (DAKIN'S 1/4 STRENGTH) 0.125 % solution topical solution 0.125% Apply to affected area twice daily as directed (Patient not taking: Reported on 3/27/2024) 473 mL 5 Not Taking    sodium hypochlorite (DAKIN'S) 0.25 % topical solution Use to wound daily as instructed 473 mL 1     sucralfate (CARAFATE) 1 g tablet Take 1 tablet by mouth 3 times a day.       tamsulosin (FLOMAX) 0.4 MG capsule 24 hr capsule Take 1 capsule by mouth Daily. 90 capsule 1     timolol (TIMOPTIC) 0.5 % ophthalmic solution Administer 1 drop to both eyes 2 (Two) Times a Day.       valsartan (DIOVAN) 40 MG tablet Take 1 tablet by mouth Daily.       zinc sulfate (ZINCATE) 50 MG capsule Take 1 capsule by mouth Daily.          Medicines:  Current Facility-Administered Medications   Medication Dose Route Frequency Provider Last Rate Last Admin    acetaminophen (TYLENOL) tablet 650 mg  650 mg Oral Q4H PRN Raquel Duncan APRN   650 mg at 04/03/24 0518    Or    acetaminophen (TYLENOL) 160 MG/5ML oral solution 650 mg  650 mg Oral Q4H PRN Raquel Duncan APRN   650 mg at 03/27/24 2109    Or    acetaminophen (TYLENOL) suppository 650 mg  650 mg Rectal Q4H PRN Raquel Duncan APRN        apixaban (ELIQUIS) tablet 5 mg  5 mg Oral Q12H Raquel Duncan APRN   5 mg at 04/03/24 0801    ascorbic acid (VITAMIN C) tablet 1,000 mg  1,000 mg Oral Daily Rene Maloney MD   1,000 mg at 04/03/24 0800    aspirin EC tablet 81 mg  81 mg Oral Daily Rene Maloney MD   81 mg at 04/03/24 0800    sennosides-docusate (PERICOLACE) 8.6-50 MG per tablet 1 tablet  1 tablet Oral BID Raquel Duncan APRN   1 tablet at 04/03/24 0801    And    polyethylene glycol (MIRALAX)  packet 17 g  17 g Oral Daily PRN Raquel Duncan APRN        And    bisacodyl (DULCOLAX) EC tablet 5 mg  5 mg Oral Daily PRN Raquel Duncan APRN        And    bisacodyl (DULCOLAX) suppository 10 mg  10 mg Rectal Daily PRN Raquel Duncan APRN        calcitriol (ROCALTROL) capsule 0.5 mcg  0.5 mcg Oral Daily Rene Maloney MD   0.5 mcg at 04/03/24 0800    carvedilol (COREG) tablet 3.125 mg  3.125 mg Oral BID With Meals Rene Maloney MD   3.125 mg at 04/03/24 0801    dextrose (D50W) (25 g/50 mL) IV injection 25 g  25 g Intravenous Q15 Min PRN Raquel Duncan APRN        dextrose (GLUTOSE) oral gel 15 g  15 g Oral Q15 Min PRN Raquel Duncan APRN   15 g at 03/27/24 1710    Diclofenac Sodium (VOLTAREN) 1 % gel 2 g  2 g Topical 4x Daily PRN Rene Maloney MD        dicyclomine (BENTYL) capsule 20 mg  20 mg Oral TID PRN Rene Maloney MD   20 mg at 04/02/24 2234    donepezil (ARICEPT) tablet 10 mg  10 mg Oral Daily Raquel Duncan APRN   10 mg at 04/03/24 0802    famotidine (PEPCID) tablet 20 mg  20 mg Oral Daily Rene Maloney MD   20 mg at 04/03/24 0811    glucagon (GLUCAGEN) injection 1 mg  1 mg Intramuscular Q15 Min PRN Raquel Duncan APRN        haloperidol lactate (HALDOL) injection 5 mg  5 mg Intravenous Q6H PRN Rene Maloney MD   5 mg at 04/03/24 0026    insulin detemir (LEVEMIR) injection 30 Units  30 Units Subcutaneous Nightly Raquel Duncan APRN   30 Units at 04/02/24 2049    Insulin Lispro (humaLOG) injection 4-24 Units  4-24 Units Subcutaneous 4x Daily AC & at Bedtime Raquel Duncan APRN   8 Units at 04/02/24 1144    insulin regular (humuLIN R,novoLIN R) injection 10 Units  10 Units Subcutaneous TID Steve Remy MD   10 Units at 04/03/24 0811    isosorbide mononitrate (IMDUR) 24 hr tablet 30 mg  30 mg Oral Q24H Rene Maloney MD   30 mg at 04/03/24 0803    Linezolid (ZYVOX) 600 mg 300 mL  600 mg Intravenous Q12H Kaylah,  Julia MACK  mL/hr at 04/02/24 2317 600 mg at 04/02/24 2317    magnesium hydroxide (MILK OF MAGNESIA) suspension 10 mL  10 mL Oral Daily PRN Rene Maloney MD   10 mL at 04/03/24 0414    melatonin tablet 6 mg  6 mg Oral Nightly Rene Maloney MD   6 mg at 04/02/24 2035    midodrine (PROAMATINE) tablet 2.5 mg  2.5 mg Oral TID AC Rene Maloney MD   2.5 mg at 04/03/24 0801    Morphine sulfate (PF) injection 2 mg  2 mg Intravenous Q3H PRN Rene Maloney MD   2 mg at 04/03/24 0631    mupirocin (BACTROBAN) 2 % nasal ointment 1 Application  1 Application Each Nare BID Nadia Polo, APRN   1 Application at 04/03/24 0803    nitroglycerin (NITROSTAT) SL tablet 0.4 mg  0.4 mg Sublingual Q5 Min PRN Raquel Duncan, APRN        ondansetron ODT (ZOFRAN-ODT) disintegrating tablet 4 mg  4 mg Oral Q6H PRN Raquel Duncan, APRN   4 mg at 04/03/24 0537    Or    ondansetron (ZOFRAN) injection 4 mg  4 mg Intravenous Q6H PRN Raquel Duncan, APRN   4 mg at 03/29/24 1837    oxyCODONE (ROXICODONE) immediate release tablet 10 mg  10 mg Oral BID PRN Raquel Duncan APRN   10 mg at 04/02/24 2036    pregabalin (LYRICA) capsule 100 mg  100 mg Oral Nightly Rene Maloney MD   100 mg at 04/02/24 2038    pregabalin (LYRICA) capsule 50 mg  50 mg Oral Daily Rene Maloney MD   50 mg at 04/03/24 0801    rosuvastatin (CRESTOR) tablet 10 mg  10 mg Oral Nightly Rene Maloney MD   10 mg at 04/02/24 2037    sodium chloride 0.9 % flush 10 mL  10 mL Intravenous PRN Steve De Jesus MD        sodium chloride 0.9 % flush 10 mL  10 mL Intravenous Q12H Raquel Duncan APRN   10 mL at 04/03/24 0803    sodium chloride 0.9 % flush 10 mL  10 mL Intravenous PRN Raquel Duncan APRN        sodium chloride 0.9 % infusion 40 mL  40 mL Intravenous PRN Raquel Duncan APRN        sucralfate (CARAFATE) 1 GM/10ML suspension 1 g  1 g Oral TID Rene Berg MD   1 g at 04/03/24 0801    tamsulosin  (FLOMAX) 24 hr capsule 0.4 mg  0.4 mg Oral Daily Rene Maloney MD   0.4 mg at 04/03/24 0811    timolol (TIMOPTIC) 0.25 % ophthalmic solution 1 drop  1 drop Both Eyes Q12H Rene Maloney MD   1 drop at 04/03/24 0812    vitamin D3 capsule 5,000 Units  5,000 Units Oral Daily Rene Maloney MD   5,000 Units at 04/03/24 0801    zinc sulfate (ZINCATE) capsule 220 mg  220 mg Oral Daily Rene Maloney MD   220 mg at 04/03/24 0801       Past Medical History:  Past Medical History:   Diagnosis Date    Arthritis     Autonomic disease     CAD (coronary artery disease) 02/06/2017    Cervical radiculopathy 09/16/2021    Chronic constipation with acute exaccerbation 05/10/2021    Coronary artery disease     Degeneration of cervical intervertebral disc 08/11/2021    Diabetes mellitus     Diabetic foot ulcer 08/31/2020    Diabetic polyneuropathy associated with type 2 diabetes mellitus 01/18/2021    Elevated cholesterol     Gastroesophageal reflux disease 05/13/2019    Headache     HTN (hypertension), benign 05/03/2017    Hyperlipidemia     Hypertension     Mixed hyperlipidemia 02/07/2017    Multiple lung nodules 01/26/2020    5mm, 9 mm RLL identified 1/2020, not present 10/2019.    Myocardial infarction     Osteomyelitis 01/22/2020    Osteomyelitis of fifth toe of right foot 10/07/2019    Pancreatitis     Persistent insomnia 01/20/2020    Renal disorder     Sleep apnea 02/06/2017    Sleep apnea with use of continuous positive airway pressure (CPAP)     NON-COMPLIANT    Slow transit constipation 01/16/2019    Spinal stenosis in cervical region 09/16/2021    Vitamin D deficiency 03/02/2021       Past Surgical History:  Past Surgical History:   Procedure Laterality Date    ABDOMINAL SURGERY      AMPUTATION FOOT / TOE Left 10/2021    5th digit     ANTERIOR CERVICAL DISCECTOMY W/ FUSION N/A 8/5/2022    Procedure: CERVICAL DISCECTOMY ANTERIOR WITH FUSION C5-6 with possible plating of C5-7 with neuromonitoring and 1  c-arm;  Surgeon: Karel Soliz MD;  Location:  [facility] OR;  Service: Neurosurgery;  Laterality: N/A;    APPENDECTOMY      BACK SURGERY      CARDIAC CATHETERIZATION Left 2021    Procedure: Left Heart Cath w poss intervention left anatomical snuff box acess;  Surgeon: Omkar Charles DO;  Location:  [facility] CATH INVASIVE LOCATION;  Service: Cardiology;  Laterality: Left;    CARDIAC SURGERY      CATARACT EXTRACTION      CERVICAL SPINE SURGERY      COLONOSCOPY N/A 2017    Normal exam repeat in 5 years    COLONOSCOPY N/A 2019    Mild acute inflammation    COLONOSCOPY W/ POLYPECTOMY  2014    Hyperplastic polyp    CORONARY ARTERY BYPASS GRAFT  10/2015    ENDOSCOPY  2011    Gastritis with hemorrhage    ENDOSCOPY N/A 2017    Normal exam    ENDOSCOPY N/A 2019    Gastritis    ENDOSCOPY N/A 2020    Non-erosive gastritis with hemorrhage    ENDOSCOPY N/A 02/10/2021    Esophagitis    FOOT SURGERY Left     INCISION AND DRAINAGE OF WOUND Left 2007    spider bite       Family History  Family History   Problem Relation Age of Onset    Colon cancer Father     Heart disease Father     Colon cancer Sister     Colon polyps Sister     Alzheimer's disease Mother     Coronary artery disease Sister     Coronary artery disease Sister        Social History  Social History     Socioeconomic History    Marital status:    Tobacco Use    Smoking status: Former     Current packs/day: 0.00     Types: Cigarettes     Quit date:      Years since quittin.2    Smokeless tobacco: Never    Tobacco comments:     [place]   Vaping Use    Vaping status: Never Used   Substance and Sexual Activity    Alcohol use: No    Drug use: No    Sexual activity: Defer       Review of Systems:  History obtained from chart review and the patient  General ROS: No fever or chills  Respiratory ROS: No cough, shortness of breath, wheezing  Cardiovascular ROS: No chest pain or  palpitations  Gastrointestinal ROS: No abdominal pain or melena  Genito-Urinary ROS: No dysuria or hematuria  Psych ROS: No anxiety and depression  14 point ROS reviewed with the patient and negative except as noted above and in the HPI unless unable to obtain.    Objective:  Patient Vitals for the past 24 hrs:   BP Temp Temp src Pulse Resp SpO2 Weight   04/03/24 0805 146/68 97.5 °F (36.4 °C) Oral 58 18 98 % --   04/03/24 0550 -- -- -- -- -- -- 133 kg (292 lb 8.1 oz)   04/03/24 0446 128/68 97.8 °F (36.6 °C) Oral 61 18 98 % --   04/02/24 2232 132/72 97.7 °F (36.5 °C) Oral 63 18 100 % --   04/02/24 1957 122/58 97.8 °F (36.6 °C) Oral 56 18 99 % --   04/02/24 1554 157/78 97.2 °F (36.2 °C) Oral 67 18 97 % --   04/02/24 1206 138/64 96.7 °F (35.9 °C) Oral 64 16 100 % --       Intake/Output Summary (Last 24 hours) at 4/3/2024 1055  Last data filed at 4/3/2024 0836  Gross per 24 hour   Intake 240 ml   Output 1350 ml   Net -1110 ml     General: awake/alert   Chest:  clear to auscultation bilaterally without respiratory distress  CVS: regular rate and rhythm  Abdominal: soft, nontender, positive bowel sounds  Extremities:  bilateral 1+ edema  Skin: warm and dry without rash      Labs:  Results from last 7 days   Lab Units 04/03/24  0334 04/02/24  0617 04/01/24  0402   WBC 10*3/mm3 6.54 7.59 5.15   HEMOGLOBIN g/dL 9.0* 10.8* 9.9*   HEMATOCRIT % 28.9* 34.0* 30.6*   PLATELETS 10*3/mm3 197 197 162         Results from last 7 days   Lab Units 04/03/24  0334 04/02/24  0656 04/01/24  0402 03/31/24  0526 03/30/24  0620 03/29/24  0551   SODIUM mmol/L 139 139 135* 136 135* 138   POTASSIUM mmol/L 4.2 4.1 4.1 3.6 3.5 3.3*   CHLORIDE mmol/L 104 104 101 101 100 103   CO2 mmol/L 25.0 26.0 23.0 23.0 24.0 23.0   BUN mg/dL 37* 43* 46* 51* 51* 40*   CREATININE mg/dL 1.76* 2.02* 2.18* 2.52* 2.74* 2.07*   CALCIUM mg/dL 9.3 9.8 9.1 8.6 8.2* 8.4*   EGFR mL/min/1.73 41.9* 35.5* 32.4* 27.2* 24.6* 34.5*   BILIRUBIN mg/dL  --   --   --  0.5 0.5 0.7    ALK PHOS U/L  --   --   --  110 95 81   ALT (SGPT) U/L  --   --   --  19 19 17   AST (SGOT) U/L  --   --   --  30 32 32   GLUCOSE mg/dL 118* 106* 292* 201* 104* 120*       Radiology:   Imaging Results (Last 72 Hours)       ** No results found for the last 72 hours. **            Culture:  Blood Culture   Date Value Ref Range Status   03/26/2024 Staphylococcus aureus, MRSA (C)  Final     Comment:       Infectious disease consultation is highly recommended to rule out distant foci of infection.  Methicillin resistant Staphylococcus aureus, Patient may be an isolation risk.   03/26/2024 Staphylococcus aureus, MRSA (C)  Final     Comment:       Infectious disease consultation is highly recommended to rule out distant foci of infection.  Methicillin resistant Staphylococcus aureus, Patient may be an isolation risk.         Assessment    Acute kidney injury, ATN--resolving  Baseline chronic kidney disease stage 3b  Type 2 diabetes, poorly controlled (A1c 10.8%)  Hypertension   Metabolic encephalopathy--improved  Anemia of CKD  Hypokalemia--improved  Sepsis due to infection of left foot wound    Plan:  OK to resume daily Bumex  Renal function is now at baseline level  Monitor labs      Stewart Alvarez, APRN  4/3/2024  10:55 CDT

## 2024-04-03 NOTE — PLAN OF CARE
Goal Outcome Evaluation:  Plan of Care Reviewed With: patient        Progress: no change  Outcome Evaluation: OT tx completed. Pt chair alarm sounding, pt attempting to stand without assist. Pt educated on need for assist and use of call light. Pt with L CAM boot donned. Pt completed sit<>stand t/f at chair and BSC with CGA and cues required for hand placement. Pt completed fxl mobility using FWW with CGA required. Toileting completed while unsupported standing, requiring cues for proximity to commode. Pt completed grooming while standing sink-side, demo 1 posterior LOB that required CGA and UE support for correction. Pt required seated RB on BSC d/t c/o fatigue. R sock doffed/donned with Dep A required, pt refused attempt. Pt slow to respond at times during conversation and impulsive during tasks. Pt c/o 8/10 abdominal pain, nsg notified. Continue OT POC in order to increase pt safety and I during ADLs, fxl mobility, and fxl t/f's.

## 2024-04-03 NOTE — PLAN OF CARE
Problem: Adult Inpatient Plan of Care  Goal: Plan of Care Review  Outcome: Ongoing, Progressing  Flowsheets (Taken 4/3/2024 8286)  Progress: no change  Plan of Care Reviewed With: patient  Outcome Evaluation: VSS. IV L FA IID. IV abx. Edematous. RA. Voiding per urinal. Bedcheck. Up x2. Cardiac diet. Accuchek. Wound care to L foot, per order. C/O pain, see MAR. PRN Haldol given x1 for restlessness, agitation. PRN milk of mag. Call light within reach. Safety maintained.

## 2024-04-03 NOTE — THERAPY TREATMENT NOTE
Acute Care - Physical Therapy Treatment Note  [facility]     Patient Name: Erick Luong  : 1955  MRN: [id number]  Today's Date: 4/3/2024      Visit Dx:     ICD-10-CM ICD-9-CM   1. Diabetic foot infection  E11.628 250.80    L08.9 686.9   2. Poorly controlled diabetes mellitus  E11.65 250.00   3. Impaired functional mobility and activity tolerance [Z74.09]  Z74.09 V49.89     Patient Active Problem List   Diagnosis    Obesity, unspecified obesity severity, unspecified obesity type    Essential hypertension    Type 2 diabetes mellitus with hyperglycemia, with long-term current use of insulin    Nonsmoker    Anemia due to chronic kidney disease    Class 3 severe obesity due to excess calories with body mass index (BMI) of 40.0 to 44.9 in adult    Anasarca    Sleep apnea with use of continuous positive airway pressure (CPAP)    Medically noncompliant    Diabetic ulcer of left midfoot associated with type 2 diabetes mellitus, with fat layer exposed    Diabetic polyneuropathy associated with type 2 diabetes mellitus    Spinal stenosis in cervical region    Degeneration of cervical intervertebral disc    Cervical radiculopathy    Degeneration of lumbar or lumbosacral intervertebral disc    Cervical myelopathy    Bilateral carpal tunnel syndrome    CAD (coronary artery disease)    GERD without esophagitis    BPH without obstruction/lower urinary tract symptoms    Stage 3b chronic kidney disease    Chronic diastolic heart failure    Type 2 myocardial infarction due to heart failure    Left carpal tunnel syndrome    Syncope and collapse, recurrent episodes    Poorly-controlled hypertension    Rhinovirus    Peripheral vascular disease    Chronic kidney disease (CKD), stage IV (severe)    Diabetic foot infection    Sepsis     Past Medical History:   Diagnosis Date    Arthritis     Autonomic disease     CAD (coronary artery disease) 2017    Cervical radiculopathy 2021    Chronic constipation with acute  exaccerbation 05/10/2021    Coronary artery disease     Degeneration of cervical intervertebral disc 08/11/2021    Diabetes mellitus     Diabetic foot ulcer 08/31/2020    Diabetic polyneuropathy associated with type 2 diabetes mellitus 01/18/2021    Elevated cholesterol     Gastroesophageal reflux disease 05/13/2019    Headache     HTN (hypertension), benign 05/03/2017    Hyperlipidemia     Hypertension     Mixed hyperlipidemia 02/07/2017    Multiple lung nodules 01/26/2020    5mm, 9 mm RLL identified 1/2020, not present 10/2019.    Myocardial infarction     Osteomyelitis 01/22/2020    Osteomyelitis of fifth toe of right foot 10/07/2019    Pancreatitis     Persistent insomnia 01/20/2020    Renal disorder     Sleep apnea 02/06/2017    Sleep apnea with use of continuous positive airway pressure (CPAP)     NON-COMPLIANT    Slow transit constipation 01/16/2019    Spinal stenosis in cervical region 09/16/2021    Vitamin D deficiency 03/02/2021     Past Surgical History:   Procedure Laterality Date    ABDOMINAL SURGERY      AMPUTATION FOOT / TOE Left 10/2021    5th digit     ANTERIOR CERVICAL DISCECTOMY W/ FUSION N/A 8/5/2022    Procedure: CERVICAL DISCECTOMY ANTERIOR WITH FUSION C5-6 with possible plating of C5-7 with neuromonitoring and 1 c-arm;  Surgeon: Karel Soliz MD;  Location:  [facility] OR;  Service: Neurosurgery;  Laterality: N/A;    APPENDECTOMY      BACK SURGERY      CARDIAC CATHETERIZATION Left 02/08/2021    Procedure: Left Heart Cath w poss intervention left anatomical snuff box acess;  Surgeon: Omkar Charles DO;  Location:  [facility] CATH INVASIVE LOCATION;  Service: Cardiology;  Laterality: Left;    CARDIAC SURGERY      CATARACT EXTRACTION      CERVICAL SPINE SURGERY      COLONOSCOPY N/A 01/31/2017    Normal exam repeat in 5 years    COLONOSCOPY N/A 02/11/2019    Mild acute inflammation    COLONOSCOPY W/ POLYPECTOMY  03/04/2014    Hyperplastic polyp    CORONARY ARTERY BYPASS GRAFT  10/2015     ENDOSCOPY  04/13/2011    Gastritis with hemorrhage    ENDOSCOPY N/A 05/05/2017    Normal exam    ENDOSCOPY N/A 02/11/2019    Gastritis    ENDOSCOPY N/A 09/01/2020    Non-erosive gastritis with hemorrhage    ENDOSCOPY N/A 02/10/2021    Esophagitis    FOOT SURGERY Left     INCISION AND DRAINAGE OF WOUND Left 09/2007    spider bite     PT Assessment (Last 12 Hours)       PT Evaluation and Treatment       [facility] Name 04/03/24 0730          Physical Therapy Time and Intention    Subjective Information complains of;weakness;fatigue;pain  -     Document Type therapy note (daily note)  -     Mode of Treatment physical therapy  -[facility] Name 04/03/24 0730          General Information    Existing Precautions/Restrictions fall  cam boot  on LLE with transfers or walking; WBAT  -[facility] Name 04/03/24 0730          Pain    Pretreatment Pain Rating 7/10  -     Posttreatment Pain Rating 7/10  -     Pain Location - abdomen  -     Pain Intervention(s) Repositioned;Ambulation/increased activity  -[facility] Name 04/03/24 0730          Mobility    Extremity Weight-bearing Status left lower extremity  -     Left Lower Extremity (Weight-bearing Status) weight-bearing as tolerated (WBAT)  with cam boot on per Dr. Cerrato  -[facility] Name 04/03/24 0730          Bed Mobility    Supine-Sit Rhea (Bed Mobility) standby assist  -     Sit-Supine Rhea (Bed Mobility) --  CHAIR  -[facility] Name 04/03/24 0730          Sit-Stand Transfer    Sit-Stand Rhea (Transfers) contact guard;verbal cues  -[facility] Name 04/03/24 0730          Stand-Sit Transfer    Stand-Sit Rhea (Transfers) contact guard;verbal cues  -[facility] Name 04/03/24 0730          Gait/Stairs (Locomotion)    Rhea Level (Gait) verbal cues;contact guard  -     Assistive Device (Gait) walker, front-wheeled  -     Distance in Feet (Gait) 30  -[facility] Name 04/03/24 0730          Motor Skills    Comments,  Therapeutic Exercise BLE AROM 10 x2 reps  -       Row Name 04/03/24 0730          Ankle Foot Orthosis Management    Fabrication Comment (Ankle Foot Orthosis) L CAM boot  -     Wearing Schedule (Ankle Foot Orthosis) wear when out of bed only  -       Row Name             Wound 06/15/23 0102 Left anterior plantar    Wound - Properties Group Placement Date: 06/15/23  -SM Placement Time: 0102  -SM Side: Left  -SM Orientation: anterior  -SM Location: plantar  -SM    Retired Wound - Properties Group Placement Date: 06/15/23  -SM Placement Time: 0102  -SM Side: Left  -SM Orientation: anterior  -SM Location: plantar  -SM    Retired Wound - Properties Group Date first assessed: 06/15/23  -SM Time first assessed: 0102 -SM Side: Left  -SM Location: plantar  -SM      Row Name             Wound 08/22/23 0140 Left posterior plantar    Wound - Properties Group Placement Date: 08/22/23  -AM Placement Time: 0140  -AM Present on Original Admission: Y  -AM Side: Left  -AM Orientation: posterior  -AM Location: plantar  -AM    Retired Wound - Properties Group Placement Date: 08/22/23  -AM Placement Time: 0140  -AM Present on Original Admission: Y  -AM Side: Left  -AM Orientation: posterior  -AM Location: plantar  -AM    Retired Wound - Properties Group Date first assessed: 08/22/23  -AM Time first assessed: 0140  -AM Present on Original Admission: Y  -AM Side: Left  -AM Location: plantar  -AM      Row Name             Wound Left lateral foot Diabetic Ulcer    Wound - Properties Group Side: Left  -MH Orientation: lateral  -MH Location: foot  -JACIEL, 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;diabetic foot ulcer/gangrene  Primary Wound Type: Diabetic ulc  -JACIEL Wound Outcome: Amputation  -JACIEL Stage, Pressure Injury : other (see comments)  -JACIEL, full thickness starting 10/20/21     Retired Wound - Properties Group Side: Left  -MH Orientation: lateral  -MH Location: foot  -JACIEL, 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;diabetic foot ulcer/gangrene  Primary Wound Type: Diabetic  ulc  -JACIEL Stage, Pressure Injury : other (see comments)  -JACIEL, full thickness starting 10/20/21  Wound Outcome: Amputation  -JACIEL    Retired Wound - Properties Group Side: Left  - Location: foot  -JACIEL, 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;diabetic foot ulcer/gangrene  Primary Wound Type: Diabetic ulc  -JACIEL Wound Outcome: Amputation  -JACIEL      Row Name 04/03/24 0730          Plan of Care Review    Plan of Care Reviewed With patient  -     Progress no change  -     Outcome Evaluation pt bed exit alarming, pt trans to EOB sba, dong L CAM boot max assist, sit-stand cga, pt amb 30 feet cga rwx, trans to chair cga, cues for safety, BLE AROM 10 x 2 reps,  -AH       Row Name 04/03/24 0730          Positioning and Restraints    Pre-Treatment Position in bed  -     Post Treatment Position chair  -AH     In Chair notified nsg;reclined;call light within reach;encouraged to call for assist;exit alarm on  -AH               User Key  (r) = Recorded By, (t) = Taken By, (c) = Cosigned By      Initials Name Provider Type     Chery Rosado, PTA Physical Therapist Assistant    Yuliana Lisa RN Registered Nurse    MH Haase, Mallory L, RN Registered Nurse    Faviola Kunz RN Registered Nurse    Michelle Diaz RN Registered Nurse                    Physical Therapy Education       Title: PT OT SLP Therapies (In Progress)       Topic: Physical Therapy (In Progress)       Point: Mobility training (Done)       Learning Progress Summary             Patient Acceptance, E,TB,D, VU,NR by  at 3/29/2024 1009    Comment: education re: purpose of PT/importance of activity, safety/falls prevention, NWB L LE, improved tech w/ bed mobility, positioning for pressure relief                         Point: Home exercise program (Not Started)       Learner Progress:  Not documented in this visit.              Point: Precautions (Done)       Learning Progress Summary             Patient Acceptance, E,TB,D, VU,NR by  at 3/29/2024 1009     Comment: education re: purpose of PT/importance of activity, safety/falls prevention, NWB L LE, improved tech w/ bed mobility, positioning for pressure relief                                         User Key       Initials Effective Dates Name Provider Type [facility] 08/02/18 -  Melly Mustafa, PT Physical Therapist PT                  PT Recommendation and Plan     Plan of Care Reviewed With: patient  Progress: no change  Outcome Evaluation: pt bed exit alarming, pt trans to EOB sba, dong L CAM boot max assist, sit-stand cga, pt amb 30 feet cga rwx, trans to chair cga, cues for safety, BLE AROM 10 x 2 reps,   Outcome Measures       Row Name 04/03/24 0800 04/02/24 0800 04/01/24 1300       How much help from another person do you currently need...    Turning from your back to your side while in flat bed without using bedrails? 3  -AH 4  -AH 4  -AH    Moving from lying on back to sitting on the side of a flat bed without bedrails? 3  -AH 3  -AH 3  -AH    Moving to and from a bed to a chair (including a wheelchair)? 3  -AH 3  -AH 3  -AH    Standing up from a chair using your arms (e.g., wheelchair, bedside chair)? 3  -AH 3  -AH 3  -AH    Climbing 3-5 steps with a railing? 2  -AH 2  -AH 2  -AH    To walk in hospital room? 3  -AH 3  -AH 3  -AH    AM-PAC 6 Clicks Score (PT) 17  -AH 18  -AH 18  -AH    Highest Level of Mobility Goal 5 --> Static standing  - 6 --> Walk 10 steps or more  - 6 --> Walk 10 steps or more  -       Functional Assessment    Outcome Measure Options AM-PAC 6 Clicks Basic Mobility (PT)  - AM-PAC 6 Clicks Basic Mobility (PT)  - AM-PAC 6 Clicks Basic Mobility (PT)  -              User Key  (r) = Recorded By, (t) = Taken By, (c) = Cosigned By      Initials Name Provider Type     Chery Rosado, PTA Physical Therapist Assistant                     Time Calculation:    PT Charges       Row Name 04/03/24 0802             Time Calculation    Start Time 0730  -      Stop Time 0753   -      Time Calculation (min) 23 min  -      PT Received On 04/03/24  -         Time Calculation- PT    Total Timed Code Minutes- PT 23 minute(s)  -         Timed Charges    19521 - PT Therapeutic Exercise Minutes 10  -      94301 - Gait Training Minutes  13  -         Total Minutes    Timed Charges Total Minutes 23  -       Total Minutes 23  -                User Key  (r) = Recorded By, (t) = Taken By, (c) = Cosigned By      Initials Name Provider Type     Chery Rosado, PARUL Physical Therapist Assistant                  Therapy Charges for Today       Code Description Service Date Service Provider Modifiers Qty    50127299875 HC PT THER PROC EA 15 MIN 4/2/2024 Chery Rosado, PTA GP 1    32462241023 HC GAIT TRAINING EA 15 MIN 4/2/2024 Chery Rosado, PTA GP 1    82527426721 HC PT THER PROC EA 15 MIN 4/3/2024 Chery Rosado, PTA GP 1    39368494608 HC GAIT TRAINING EA 15 MIN 4/3/2024 Chery Rosado, PTA GP 1            PT G-Codes  Outcome Measure Options: AM-PAC 6 Clicks Basic Mobility (PT)  AM-PAC 6 Clicks Score (PT): 17  AM-PAC 6 Clicks Score (OT): 17    Chery Rosado PTA  4/3/2024

## 2024-04-03 NOTE — PROGRESS NOTES
[facility] Medicine Services  INPATIENT PROGRESS NOTE    Patient Name: Erick Luong  Date of Admission: 3/26/2024  Today's Date: 04/03/24  Length of Stay: 8  Primary Care Physician: Del Shetty MD    Subjective   Chief Complaint: Altered mental status  Erick Luong is a 68-year-old male with a past medical history of coronary artery disease, diastolic dysfunction followed by Dr. Garay, vascular disease, GERD, diabetes, chronic anemia, chronic nonhealing wound to the left foot followed by [facility] wound care.  Patient had an extended hospitalization 8/2023 due to syncope, subsequent admission to [facility] 9/18 - 10/11, 2023.  During that admission he did undergo debridement of Proteus wound infection.     Patient seen by PCP on 3/11/2024, started on prazosin 1 mg daily, duloxetine 60 mg daily magnesium hydroxide 400 mg daily per office note.  Wife is unaware of exactly what patient is taking.  Will need pharmacy to obtain correct med list.     W[facility] who performed debridement on the plantar wound treating with Santyl, is also a area on dorsal aspect of the foot.  Foot is erythemic, warm to touch.  Doctor told patient and wife he was doing well.  There was no signs of infection yesterday.  Today patient was brought to emergency department via EMS for altered mental status with disorientation, glucose was noted to be over 500 in the ambulance.  Initial glucose here 400 followed with 438 treated with regular insulin.  I have taken care of this patient many times.  He awakens for questions.  He is somewhat somnolent.  Remainder workup reveals creatinine 1.9 at baseline, CRP 4.3, lactic acid 3.2 and procalcitonin 6.56.  He does have a white count of 14.6, no bandemia.  Initially afebrile however now 100.7, patient meets criteria for sepsis with tachycardia and tachypnea, elevated lactic acid and source of infection.   There is no organ failure.  Patient is admitted for simple sepsis, condition stabl  3/31   Patient is alert and oriented x 3.  He appears to be in good spirits.  He is requesting for discharge.  He was counseled that we will need to wait until later in this week, after we set up IV antibiotics before we can discharge him.    4/1  Patient was admitted to ER on 3/26 with altered mental status. Had debridement of ongoing wound on left foot the day prior. Left foot warm and erythremic on arrival to ER. Noted to have elevated blood glucose over 400. Initial creatinine of 1.9.  [facility] nephrology were consulted for acute on chronic renal failure MRI of the left foot did not show any osteomyelitis or abscess.  [facility] ID patient was found to have MRSA bacteremia-blood cultures + March 26 and March 29.  Secondary to infected left foot with wounds and associated cellulitis on admission patient was started on Zyvox plan is for echo today rule out endocarditis given persistent bacteremia holding off on PICC line pending that   4/2  As before history of coronary disease diabetes chronic diabetic foot ulcer noncompliance with A1c 10.4 presented to us with mental status change was found to have MRSA sepsis bacteremia secondary to his infected foot MRI did not show any osteo or abscess has been on Zyvox echo was ordered to rule out endocarditis for persistent bacteremia, podiatry recommends wound care and follow-up with outpatient wound care appreciate ID Currently plan for at least 2 weeks of linezolid with start date after clearance of blood cultures-currently March 30. Should be able to transition to oral linezolid given increased bioavailability. Duration may change based on echocardiogram findings , [facility] nephrology acute on chronic renal failure secondary to ATN resolving  4/3  Remains on Zyvox blood cultures remains negative echo of the heart could not rule out endocarditis poor quality recommending YANIV if  endocarditis is a concern we will leave this up to the infectious disease attending appreciate nephrology renal function is getting better, per podiatry continue wound care continue antibiotics MRI is showing no evidence of soft tissue abscess or osteomyelitis podiatry will see as outpatient, patient A1c is 10.8 needs much better control of his diabetes and much better follow-up with his family doctor regarding his diabetes, and is having stomach distention and bloating last bowel movement was 3 days ago will do KUB and start him on lactulose  Review of Systems   All pertinent negatives and positives are as above. All other systems have been reviewed and are negative unless otherwise stated.     Objective    Temp:  [96.7 °F (35.9 °C)-97.8 °F (36.6 °C)] 97.5 °F (36.4 °C)  Heart Rate:  [56-67] 58  Resp:  [16-18] 18  BP: (122-157)/(58-78) 146/68  Physical Exam  Constitutional:       General: He is not in acute distress.     Appearance: He is well-developed. He is not diaphoretic.   HENT:      Head: Normocephalic.   Eyes:      General: No scleral icterus.     Conjunctiva/sclera: Conjunctivae normal.      Pupils: Pupils are equal, round, and reactive to light.   Neck:      Thyroid: No thyromegaly.      Vascular: No JVD.      Trachea: No tracheal deviation.   Cardiovascular:      Rate and Rhythm: Normal rate and regular rhythm.      Heart sounds: Normal heart sounds. No murmur heard.     No friction rub. No gallop.   Pulmonary:      Effort: Pulmonary effort is normal. No respiratory distress.      Breath sounds: Normal breath sounds. No stridor. No wheezing or rales.   Chest:      Chest wall: No tenderness.   Abdominal:      General: Bowel sounds are normal. There is no distension.      Palpations: Abdomen is soft. There is no mass.      Tenderness: There is no abdominal tenderness. There is no guarding or rebound.      Hernia: No hernia is present.   Musculoskeletal:         General: Swelling and signs of injury present.  No tenderness or deformity. Normal range of motion.      Cervical back: Normal range of motion and neck supple.      Right lower leg: No edema.      Comments: Dressing over left foot noted.   Lymphadenopathy:      Cervical: No cervical adenopathy.   Skin:     General: Skin is warm and dry.      Coloration: Skin is not pale.      Findings: No erythema or rash.   Neurological:      General: No focal deficit present.      Mental Status: He is alert and oriented to person, place, and time.      Cranial Nerves: No cranial nerve deficit.      Sensory: No sensory deficit.      Motor: No abnormal muscle tone.      Coordination: Coordination normal.   Psychiatric:         Mood and Affect: Mood normal.         Behavior: Behavior normal.            File Link    Scan on 3/26/2024 1423 by Ro Vinson RN: Wound 06/15/23 0102 Left anterior plantar        Key Information    Document ID File Type Document Type Description   [id number] Image WOUND IMAGE Wound 06/15/23 0102 Left anterior plantar     Import Information    Attached At Date Time User Dept   Encounter Level 3/26/2024  2:23 PM Ro Vinson RN [facility] Emergency Dept     Encounter    Hospital Encounter on 3/26/24     Results Review:  I have reviewed the labs, radiology results, and diagnostic studies.    Laboratory Data:   Results from last 7 days   Lab Units 04/03/24  0334 04/02/24  0617 04/01/24  0402   WBC 10*3/mm3 6.54 7.59 5.15   HEMOGLOBIN g/dL 9.0* 10.8* 9.9*   HEMATOCRIT % 28.9* 34.0* 30.6*   PLATELETS 10*3/mm3 197 197 162        Results from last 7 days   Lab Units 04/03/24  0334 04/02/24  0656 04/01/24  0402 03/31/24  0526 03/30/24  0620 03/29/24  0551   SODIUM mmol/L 139 139 135* 136 135* 138   POTASSIUM mmol/L 4.2 4.1 4.1 3.6 3.5 3.3*   CHLORIDE mmol/L 104 104 101 101 100 103   CO2 mmol/L 25.0 26.0 23.0 23.0 24.0 23.0   BUN mg/dL 37* 43* 46* 51* 51* 40*   CREATININE mg/dL 1.76* 2.02* 2.18* 2.52* 2.74* 2.07*   CALCIUM mg/dL 9.3 9.8 9.1 8.6 8.2* 8.4*    BILIRUBIN mg/dL  --   --   --  0.5 0.5 0.7   ALK PHOS U/L  --   --   --  110 95 81   ALT (SGPT) U/L  --   --   --  19 19 17   AST (SGOT) U/L  --   --   --  30 32 32   GLUCOSE mg/dL 118* 106* 292* 201* 104* 120*       Culture Data:   Blood Culture   Date Value Ref Range Status   03/26/2024 Abnormal Stain (C)  Preliminary   03/26/2024 Abnormal Stain (C)  Preliminary       Radiology Data:   Imaging Results (Last 24 Hours)       ** No results found for the last 24 hours. **            I have reviewed the patient's current medications.     Assessment/Plan   Assessment  Active Hospital Problems    Diagnosis     **Sepsis     Diabetic foot infection     Chronic kidney disease (CKD), stage IV (severe)     Chronic diastolic heart failure     BPH without obstruction/lower urinary tract symptoms     Diabetic polyneuropathy associated with type 2 diabetes mellitus     Anemia due to chronic kidney disease     Essential hypertension     Type 2 diabetes mellitus with hyperglycemia, with long-term current use of insulin    MRSA bacteremia  Metabolic encephalopathy secondary to sepsis  WANDER on CKD    Treatment Plan  Continue IV antibiotics with Zyvox. Infectious disease input appreciated.  Mental status appears back to baseline now that sepsis is better controlled.  However he has persistently positive blood cultures.  Plan for 2D echo on Monday.  Will hold off on inserting PICC line for now until blood cultures are clear.    Podiatry consultation input appreciated.  Will follow recommendations.  Continue wound dressings as per podiatry Follow-up repeat blood cultures.    Nephrology and urology input appreciated.  Hold Bumex due to elevated creatinine and BUN.  No signs of urinary retention at this time.    Pain control with opiate pain medications.    Insulin sliding scale and Levemir for type 2 diabetes mellitus    DVT prophylaxis with Eliquis    PT/OT/wound care consultations    DVT prophylaxis with Eliquis    CODE STATUS is full  code    Medical Decision Making  Number and Complexity of problems: 4 acute, severe, high complexity medical problems.  Multiple chronic medical problems.  Differential Diagnosis: None    Conditions and Status        Condition is worsening.     [facility] Data  External documents reviewed: None  Cardiac tracing (EKG, telemetry) interpretation: None  Radiology interpretation: None  Labs reviewed: CBC, BMP, blood cultures reviewed by me  Any tests that were considered but not ordered: None     Decision rules/scores evaluated (example [id number], Wells, etc): N/A     Discussed with: Patient     Care Planning  Shared decision making: Patient and his wife  Code status and discussions: CODE STATUS is full code    Disposition  Social Determinants of Health that impact treatment or disposition: None  I expect the patient to be discharged to home in 3 to 5 days.     Electronically signed by Latanya Paez MD, 04/03/24, 09:49 CDT.

## 2024-04-04 LAB
ANION GAP SERPL CALCULATED.3IONS-SCNC: 11 MMOL/L (ref 5–15)
BACTERIA SPEC AEROBE CULT: NORMAL
BASOPHILS # BLD AUTO: 0.03 10*3/MM3 (ref 0–0.2)
BASOPHILS NFR BLD AUTO: 0.5 % (ref 0–1.5)
BUN SERPL-MCNC: 32 MG/DL (ref 8–23)
BUN/CREAT SERPL: 18 (ref 7–25)
CALCIUM SPEC-SCNC: 10.5 MG/DL (ref 8.6–10.5)
CHLORIDE SERPL-SCNC: 102 MMOL/L (ref 98–107)
CO2 SERPL-SCNC: 26 MMOL/L (ref 22–29)
CREAT SERPL-MCNC: 1.78 MG/DL (ref 0.76–1.27)
DEPRECATED RDW RBC AUTO: 45.7 FL (ref 37–54)
EGFRCR SERPLBLD CKD-EPI 2021: 41.3 ML/MIN/1.73
EOSINOPHIL # BLD AUTO: 0.25 10*3/MM3 (ref 0–0.4)
EOSINOPHIL NFR BLD AUTO: 4.4 % (ref 0.3–6.2)
ERYTHROCYTE [DISTWIDTH] IN BLOOD BY AUTOMATED COUNT: 14.2 % (ref 12.3–15.4)
GLUCOSE BLDC GLUCOMTR-MCNC: 124 MG/DL (ref 70–130)
GLUCOSE BLDC GLUCOMTR-MCNC: 127 MG/DL (ref 70–130)
GLUCOSE BLDC GLUCOMTR-MCNC: 127 MG/DL (ref 70–130)
GLUCOSE BLDC GLUCOMTR-MCNC: 230 MG/DL (ref 70–130)
GLUCOSE BLDC GLUCOMTR-MCNC: 232 MG/DL (ref 70–130)
GLUCOSE SERPL-MCNC: 185 MG/DL (ref 65–99)
HCT VFR BLD AUTO: 31.3 % (ref 37.5–51)
HGB BLD-MCNC: 10 G/DL (ref 13–17.7)
IMM GRANULOCYTES # BLD AUTO: 0.03 10*3/MM3 (ref 0–0.05)
IMM GRANULOCYTES NFR BLD AUTO: 0.5 % (ref 0–0.5)
LYMPHOCYTES # BLD AUTO: 1.57 10*3/MM3 (ref 0.7–3.1)
LYMPHOCYTES NFR BLD AUTO: 27.8 % (ref 19.6–45.3)
MCH RBC QN AUTO: 28.4 PG (ref 26.6–33)
MCHC RBC AUTO-ENTMCNC: 31.9 G/DL (ref 31.5–35.7)
MCV RBC AUTO: 88.9 FL (ref 79–97)
MONOCYTES # BLD AUTO: 0.57 10*3/MM3 (ref 0.1–0.9)
MONOCYTES NFR BLD AUTO: 10.1 % (ref 5–12)
NEUTROPHILS NFR BLD AUTO: 3.19 10*3/MM3 (ref 1.7–7)
NEUTROPHILS NFR BLD AUTO: 56.7 % (ref 42.7–76)
NRBC BLD AUTO-RTO: 0 /100 WBC (ref 0–0.2)
PLATELET # BLD AUTO: 251 10*3/MM3 (ref 140–450)
PMV BLD AUTO: 10.6 FL (ref 6–12)
POTASSIUM SERPL-SCNC: 4.6 MMOL/L (ref 3.5–5.2)
RBC # BLD AUTO: 3.52 10*6/MM3 (ref 4.14–5.8)
SODIUM SERPL-SCNC: 139 MMOL/L (ref 136–145)
WBC NRBC COR # BLD AUTO: 5.64 10*3/MM3 (ref 3.4–10.8)

## 2024-04-04 PROCEDURE — 97110 THERAPEUTIC EXERCISES: CPT

## 2024-04-04 PROCEDURE — 25010000002 MORPHINE PER 10 MG: Performed by: INTERNAL MEDICINE

## 2024-04-04 PROCEDURE — 97116 GAIT TRAINING THERAPY: CPT

## 2024-04-04 PROCEDURE — 63710000001 INSULIN LISPRO (HUMAN) PER 5 UNITS: Performed by: NURSE PRACTITIONER

## 2024-04-04 PROCEDURE — 85025 COMPLETE CBC W/AUTO DIFF WBC: CPT | Performed by: INTERNAL MEDICINE

## 2024-04-04 PROCEDURE — 25010000002 LINEZOLID 600 MG/300ML SOLUTION: Performed by: INTERNAL MEDICINE

## 2024-04-04 PROCEDURE — 80048 BASIC METABOLIC PNL TOTAL CA: CPT | Performed by: INTERNAL MEDICINE

## 2024-04-04 PROCEDURE — 97535 SELF CARE MNGMENT TRAINING: CPT

## 2024-04-04 PROCEDURE — 63710000001 INSULIN DETEMIR PER 5 UNITS: Performed by: NURSE PRACTITIONER

## 2024-04-04 PROCEDURE — 82948 REAGENT STRIP/BLOOD GLUCOSE: CPT

## 2024-04-04 PROCEDURE — 63710000001 INSULIN REGULAR HUMAN PER 5 UNITS: Performed by: EMERGENCY MEDICINE

## 2024-04-04 PROCEDURE — 99232 SBSQ HOSP IP/OBS MODERATE 35: CPT | Performed by: INTERNAL MEDICINE

## 2024-04-04 RX ORDER — BISACODYL 5 MG/1
5 TABLET, DELAYED RELEASE ORAL DAILY PRN
Status: DISCONTINUED | OUTPATIENT
Start: 2024-04-04 | End: 2024-04-11 | Stop reason: HOSPADM

## 2024-04-04 RX ORDER — AMOXICILLIN 250 MG
1 CAPSULE ORAL 2 TIMES DAILY
Status: DISCONTINUED | OUTPATIENT
Start: 2024-04-04 | End: 2024-04-11 | Stop reason: HOSPADM

## 2024-04-04 RX ORDER — POLYETHYLENE GLYCOL 3350 17 G/17G
17 POWDER, FOR SOLUTION ORAL DAILY
Status: DISCONTINUED | OUTPATIENT
Start: 2024-04-04 | End: 2024-04-11 | Stop reason: HOSPADM

## 2024-04-04 RX ORDER — BISACODYL 10 MG
10 SUPPOSITORY, RECTAL RECTAL DAILY PRN
Status: DISCONTINUED | OUTPATIENT
Start: 2024-04-04 | End: 2024-04-11 | Stop reason: HOSPADM

## 2024-04-04 RX ADMIN — TAMSULOSIN HYDROCHLORIDE 0.4 MG: 0.4 CAPSULE ORAL at 08:49

## 2024-04-04 RX ADMIN — MORPHINE SULFATE 2 MG: 2 INJECTION, SOLUTION INTRAMUSCULAR; INTRAVENOUS at 06:01

## 2024-04-04 RX ADMIN — ASPIRIN 81 MG: 81 TABLET, COATED ORAL at 08:49

## 2024-04-04 RX ADMIN — TIMOLOL MALEATE 1 DROP: 2.5 SOLUTION/ DROPS OPHTHALMIC at 20:47

## 2024-04-04 RX ADMIN — Medication 5000 UNITS: at 08:49

## 2024-04-04 RX ADMIN — POLYETHYLENE GLYCOL 3350 17 G: 17 POWDER, FOR SOLUTION ORAL at 12:22

## 2024-04-04 RX ADMIN — LINEZOLID 600 MG: 600 INJECTION, SOLUTION INTRAVENOUS at 23:31

## 2024-04-04 RX ADMIN — ACETAMINOPHEN 650 MG: 325 TABLET, FILM COATED ORAL at 00:02

## 2024-04-04 RX ADMIN — INSULIN HUMAN 10 UNITS: 100 INJECTION, SOLUTION PARENTERAL at 17:43

## 2024-04-04 RX ADMIN — ISOSORBIDE MONONITRATE 30 MG: 30 TABLET, EXTENDED RELEASE ORAL at 08:48

## 2024-04-04 RX ADMIN — SUCRALFATE 1 G: 1 SUSPENSION ORAL at 08:52

## 2024-04-04 RX ADMIN — INSULIN HUMAN 10 UNITS: 100 INJECTION, SOLUTION PARENTERAL at 08:47

## 2024-04-04 RX ADMIN — PREGABALIN 100 MG: 100 CAPSULE ORAL at 20:43

## 2024-04-04 RX ADMIN — FAMOTIDINE 20 MG: 20 TABLET, FILM COATED ORAL at 08:47

## 2024-04-04 RX ADMIN — MIDODRINE HYDROCHLORIDE 2.5 MG: 2.5 TABLET ORAL at 12:22

## 2024-04-04 RX ADMIN — INSULIN DETEMIR 30 UNITS: 100 INJECTION, SOLUTION SUBCUTANEOUS at 21:07

## 2024-04-04 RX ADMIN — DOCUSATE SODIUM AND SENNOSIDES 1 TABLET: 8.6; 5 TABLET, FILM COATED ORAL at 20:43

## 2024-04-04 RX ADMIN — SUCRALFATE 1 G: 1 SUSPENSION ORAL at 12:22

## 2024-04-04 RX ADMIN — ROSUVASTATIN CALCIUM 10 MG: 10 TABLET, FILM COATED ORAL at 20:43

## 2024-04-04 RX ADMIN — Medication 10 MG: at 00:34

## 2024-04-04 RX ADMIN — OXYCODONE HYDROCHLORIDE 10 MG: 5 TABLET ORAL at 11:08

## 2024-04-04 RX ADMIN — CALCITRIOL 0.5 MCG: 0.25 CAPSULE ORAL at 08:48

## 2024-04-04 RX ADMIN — Medication 10 ML: at 12:25

## 2024-04-04 RX ADMIN — LACTULOSE 20 G: 20 SOLUTION ORAL at 08:53

## 2024-04-04 RX ADMIN — Medication 10 ML: at 20:39

## 2024-04-04 RX ADMIN — LINEZOLID 600 MG: 600 INJECTION, SOLUTION INTRAVENOUS at 12:24

## 2024-04-04 RX ADMIN — APIXABAN 5 MG: 5 TABLET, FILM COATED ORAL at 20:43

## 2024-04-04 RX ADMIN — CARVEDILOL 3.12 MG: 3.12 TABLET, FILM COATED ORAL at 17:42

## 2024-04-04 RX ADMIN — APIXABAN 5 MG: 5 TABLET, FILM COATED ORAL at 08:49

## 2024-04-04 RX ADMIN — SUCRALFATE 1 G: 1 SUSPENSION ORAL at 17:43

## 2024-04-04 RX ADMIN — DONEPEZIL HYDROCHLORIDE 10 MG: 10 TABLET, FILM COATED ORAL at 08:49

## 2024-04-04 RX ADMIN — LACTULOSE 20 G: 20 SOLUTION ORAL at 20:43

## 2024-04-04 RX ADMIN — Medication 6 MG: at 20:43

## 2024-04-04 RX ADMIN — Medication 1 APPLICATION: at 08:49

## 2024-04-04 RX ADMIN — OXYCODONE HYDROCHLORIDE 10 MG: 5 TABLET ORAL at 23:29

## 2024-04-04 RX ADMIN — DOCUSATE SODIUM AND SENNOSIDES 1 TABLET: 8.6; 5 TABLET, FILM COATED ORAL at 08:47

## 2024-04-04 RX ADMIN — MIDODRINE HYDROCHLORIDE 2.5 MG: 2.5 TABLET ORAL at 08:48

## 2024-04-04 RX ADMIN — TIMOLOL MALEATE 1 DROP: 2.5 SOLUTION/ DROPS OPHTHALMIC at 14:50

## 2024-04-04 RX ADMIN — OXYCODONE HYDROCHLORIDE AND ACETAMINOPHEN 1000 MG: 500 TABLET ORAL at 08:49

## 2024-04-04 RX ADMIN — PREGABALIN 50 MG: 50 CAPSULE ORAL at 08:49

## 2024-04-04 RX ADMIN — INSULIN LISPRO 8 UNITS: 100 INJECTION, SOLUTION INTRAVENOUS; SUBCUTANEOUS at 12:22

## 2024-04-04 RX ADMIN — INSULIN LISPRO 8 UNITS: 100 INJECTION, SOLUTION INTRAVENOUS; SUBCUTANEOUS at 08:47

## 2024-04-04 RX ADMIN — ZINC SULFATE 220 MG (50 MG) CAPSULE 220 MG: CAPSULE at 08:49

## 2024-04-04 RX ADMIN — LINEZOLID 600 MG: 600 INJECTION, SOLUTION INTRAVENOUS at 00:02

## 2024-04-04 RX ADMIN — INSULIN HUMAN 10 UNITS: 100 INJECTION, SOLUTION PARENTERAL at 12:22

## 2024-04-04 RX ADMIN — Medication 1 APPLICATION: at 20:43

## 2024-04-04 RX ADMIN — MIDODRINE HYDROCHLORIDE 2.5 MG: 2.5 TABLET ORAL at 17:42

## 2024-04-04 RX ADMIN — LACTULOSE 20 G: 20 SOLUTION ORAL at 17:43

## 2024-04-04 RX ADMIN — BUMETANIDE 1 MG: 1 TABLET ORAL at 08:47

## 2024-04-04 RX ADMIN — MORPHINE SULFATE 2 MG: 2 INJECTION, SOLUTION INTRAMUSCULAR; INTRAVENOUS at 20:40

## 2024-04-04 RX ADMIN — MORPHINE SULFATE 2 MG: 2 INJECTION, SOLUTION INTRAMUSCULAR; INTRAVENOUS at 01:48

## 2024-04-04 NOTE — NURSING NOTE
Pt called out, as nurse and PCA Yuliana was walking in pt was attempting to get up by himself and fell to his R knee. PT was helped up and vitals were stable. There was a small scrape to R knee, area cleaned and Band-Aid applied. Dr Lees notified no new orders. wife Joan updated.

## 2024-04-04 NOTE — PROGRESS NOTES
"INFECTIOUS DISEASES PROGRESS NOTE    Patient:  Erick Luong  YOB: 1955  MRN: [id number]   Admit date: 3/26/2024   Admitting Physician: Latanya Paez MD  Primary Care Physician: Del Shetty MD    Chief Complaint: None offered    Interval History: Patient had KUB yesterday which showed large amount of stool in the transverse colon and flexures.  He was started on lactulose.    He has a sitter in his room.    He ate all of his breakfast.  He did not drink his boost    Allergies:   Allergies   Allergen Reactions    Cefepime Hives and Anaphylaxis    Bactrim [Sulfamethoxazole-Trimethoprim] Other (See Comments)     \"RENAL FAILURE\"    Vancomycin Itching    Zolpidem Unknown - High Severity     \"makes him crazy\"    Zolpidem Tartrate Unknown - Low Severity and Provider Review Needed    Metronidazole Rash       Current Scheduled Medications:   apixaban, 5 mg, Oral, Q12H  ascorbic acid, 1,000 mg, Oral, Daily  aspirin, 81 mg, Oral, Daily  bumetanide, 1 mg, Oral, Daily  calcitriol, 0.5 mcg, Oral, Daily  carvedilol, 3.125 mg, Oral, BID With Meals  donepezil, 10 mg, Oral, Daily  famotidine, 20 mg, Oral, Daily  insulin detemir, 30 Units, Subcutaneous, Nightly  insulin lispro, 4-24 Units, Subcutaneous, 4x Daily AC & at Bedtime  insulin regular, 10 Units, Subcutaneous, TID AC  isosorbide mononitrate, 30 mg, Oral, Q24H  lactulose, 20 g, Oral, TID  Linezolid, 600 mg, Intravenous, Q12H  melatonin, 6 mg, Oral, Nightly  midodrine, 2.5 mg, Oral, TID AC  mupirocin, 1 Application, Each Nare, BID  pregabalin, 100 mg, Oral, Nightly  pregabalin, 50 mg, Oral, Daily  rosuvastatin, 10 mg, Oral, Nightly  senna-docusate sodium, 1 tablet, Oral, BID  sodium chloride, 10 mL, Intravenous, Q12H  sucralfate, 1 g, Oral, TID AC  tamsulosin, 0.4 mg, Oral, Daily  timolol, 1 drop, Both Eyes, Q12H  vitamin D3, 5,000 Units, Oral, Daily  zinc sulfate, 220 mg, Oral, Daily      Current PRN Medications:    acetaminophen **OR** acetaminophen " "**OR** acetaminophen    senna-docusate sodium **AND** polyethylene glycol **AND** bisacodyl **AND** bisacodyl    dextrose    dextrose    Diclofenac Sodium    dicyclomine    glucagon (human recombinant)    haloperidol lactate    magnesium hydroxide    Morphine    nitroglycerin    ondansetron ODT **OR** ondansetron    oxyCODONE    sodium chloride    sodium chloride    sodium chloride            Objective     Vital Signs:  Temp (24hrs), Av.7 °F (36.5 °C), Min:97.4 °F (36.3 °C), Max:98.2 °F (36.8 °C)      /63 (BP Location: Right arm, Patient Position: Lying)   Pulse 68   Temp 97.5 °F (36.4 °C) (Oral)   Resp 16   Ht 182.9 cm (72\")   Wt 133 kg (293 lb 12.8 oz)   SpO2 97%   BMI 39.85 kg/m²         Physical Exam:  General: Patient sitting up at the edge of the chair in no acute distress  Left foot dressing intact  Respiratory: Effort even and unlabored    Photos below from                       esults Review:    I reviewed the patient's new clinical results.    Lab Results:    CBC:   Lab Results   Lab 24  0551 24  0620 24  0526 24  0402 24  03324  0514   WBC 6.08 5.69 5.01 5.15 7.59 6.54 5.64   HEMOGLOBIN 11.1* 10.0* 10.3* 9.9* 10.8* 9.0* 10.0*   HEMATOCRIT 33.6* 30.6* 31.6* 30.6* 34.0* 28.9* 31.3*   PLATELETS 156 155 149 162 197 197 251        AutoDiff:   Lab Results   Lab 24  0624  03324  0514   NEUTROPHIL % 61.4 55.7 56.7   LYMPHOCYTE % 24.1 29.5 27.8   MONOCYTES % 10.0 9.3 10.1   EOSINOPHIL % 3.7 4.4 4.4   BASOPHIL % 0.4 0.5 0.5   NEUTROS ABS 4.66 3.64 3.19   LYMPHS ABS 1.83 1.93 1.57   MONOS ABS 0.76 0.61 0.57   EOS ABS 0.28 0.29 0.25   BASOS ABS 0.03 0.03 0.03        Manual Diff:    Lab Results   Lab 24  0617 24  0334 24  0514   NEUTROS ABS 4.66 3.64 3.19           CMP:   Lab Results   Lab 24  0551 24  0620 24  0526 24  0402 24  0656 24  0334 24  0514   SODIUM " 138 135* 136   < > 139 139 139   POTASSIUM 3.3* 3.5 3.6   < > 4.1 4.2 4.6   CHLORIDE 103 100 101   < > 104 104 102   CO2 23.0 24.0 23.0   < > 26.0 25.0 26.0   BUN 40* 51* 51*   < > 43* 37* 32*   CREATININE 2.07* 2.74* 2.52*   < > 2.02* 1.76* 1.78*   CALCIUM 8.4* 8.2* 8.6   < > 9.8 9.3 10.5   BILIRUBIN 0.7 0.5 0.5  --   --   --   --    ALK PHOS 81 95 110  --   --   --   --    ALT (SGPT) 17 19 19  --   --   --   --    AST (SGOT) 32 32 30  --   --   --   --    GLUCOSE 120* 104* 201*   < > 106* 118* 185*    < > = values in this interval not displayed.       Estimated Creatinine Clearance: 56.8 mL/min (A) (by C-G formula based on SCr of 1.78 mg/dL (H)).    Culture Results:    Microbiology Results (last 10 days)       Procedure Component Value - Date/Time    Blood Culture With DARSHAN - Blood, Hand, Right [[id number]]  (Normal) Collected: 04/01/24 0402    Lab Status: Preliminary result Specimen: Blood from Hand, Right Updated: 04/04/24 0445     Blood Culture No growth at 3 days    Blood Culture With DARSHAN - Blood, Arm, Left [[id number]]  (Normal) Collected: 03/30/24 0620    Lab Status: Final result Specimen: Blood from Arm, Left Updated: 04/04/24 0645     Blood Culture No growth at 5 days    Blood Culture With DARSHAN - Blood, Hand, Right [[id number]]  (Abnormal)  (Susceptibility) Collected: 03/29/24 0843    Lab Status: Final result Specimen: Blood from Hand, Right Updated: 04/02/24 0521     Blood Culture Staphylococcus aureus, MRSA     Comment:   Methicillin resistant Staphylococcus aureus, Patient may be an isolation risk.  Infectious disease consultation is highly recommended to rule out distant foci of infection.        Isolated from Aerobic Bottle     Gram Stain Aerobic Bottle Gram positive cocci    Susceptibility        Staphylococcus aureus, MRSA      MATT      Gentamicin Susceptible      Oxacillin Resistant      Rifampin Susceptible      Vancomycin Susceptible                       Susceptibility Comments       Staphylococcus  aureus, MRSA    This isolate is presumed to be clindamycin resistant based on detection of inducible clindamycin resistance.  Clindamycin may still be effective in some patients.               Blood Culture ID, PCR - Blood, Hand, Right [[id number]]  (Abnormal) Collected: 03/29/24 0843    Lab Status: Final result Specimen: Blood from Hand, Right Updated: 03/30/24 2028     BCID, PCR Staph aureus. mecA/C and MREJ (methicillin resistance gene) detected. Identification by BCID2 PCR.     BOTTLE TYPE Aerobic Bottle    Narrative:      Infectious disease consultation is highly recommended to rule out distant foci of infection.    MRSA Screen, PCR (Inpatient) - Swab, Nares [[id number]]  (Abnormal) Collected: 03/28/24 2128    Lab Status: Final result Specimen: Swab from Nares Updated: 03/28/24 2239     MRSA PCR MRSA Detected    Narrative:      The negative predictive value of this diagnostic test is high and should only be used to consider de-escalating anti-MRSA therapy. A positive result may indicate colonization with MRSA and must be correlated clinically.    AMAN AURIS SCREEN - Swab, Axilla Right, Axilla Left and Groin [[id number]]  (Normal) Collected: 03/27/24 0351    Lab Status: Final result Specimen: Swab from Axilla Right, Axilla Left and Groin Updated: 04/01/24 0415     Candida Auris Screen Culture No Candida auris isolated at 5 days    Respiratory Panel PCR w/COVID-19(SARS-CoV-2) [facility] In-House, NP Swab in UTM/VTM, 2 HR TAT - Swab, Nasopharynx [[id number]]  (Normal) Collected: 03/26/24 1732    Lab Status: Final result Specimen: Swab from Nasopharynx Updated: 03/26/24 1905     ADENOVIRUS, PCR Not Detected     Coronavirus 229E Not Detected     Coronavirus HKU1 Not Detected     Coronavirus NL63 Not Detected     Coronavirus OC43 Not Detected     COVID19 Not Detected     Human Metapneumovirus Not Detected     Human Rhinovirus/Enterovirus Not Detected     Influenza A PCR Not Detected     Influenza B PCR  Not Detected     Parainfluenza Virus 1 Not Detected     Parainfluenza Virus 2 Not Detected     Parainfluenza Virus 3 Not Detected     Parainfluenza Virus 4 Not Detected     RSV, PCR Not Detected     Bordetella pertussis pcr Not Detected     Bordetella parapertussis PCR Not Detected     Chlamydophila pneumoniae PCR Not Detected     Mycoplasma pneumo by PCR Not Detected    Narrative:      In the setting of a positive respiratory panel with a viral infection PLUS a negative procalcitonin without other underlying concern for bacterial infection, consider observing off antibiotics or discontinuation of antibiotics and continue supportive care. If the respiratory panel is positive for atypical bacterial infection (Bordetella pertussis, Chlamydophila pneumoniae, or Mycoplasma pneumoniae), consider antibiotic de-escalation to target atypical bacterial infection.    Blood Culture - Blood, Arm, Right [[id number]]  (Abnormal) Collected: 03/26/24 1346    Lab Status: Final result Specimen: Blood from Arm, Right Updated: 03/29/24 0513     Blood Culture Staphylococcus aureus, MRSA     Comment:   Infectious disease consultation is highly recommended to rule out distant foci of infection.  Methicillin resistant Staphylococcus aureus, Patient may be an isolation risk.        Isolated from Aerobic and Anaerobic Bottles     Gram Stain Aerobic Bottle Gram positive cocci      Anaerobic Bottle Gram positive cocci    Blood Culture - Blood, Arm, Left [[id number]]  (Abnormal)  (Susceptibility) Collected: 03/26/24 1346    Lab Status: Final result Specimen: Blood from Arm, Left Updated: 03/29/24 0513     Blood Culture Staphylococcus aureus, MRSA     Comment:   Infectious disease consultation is highly recommended to rule out distant foci of infection.  Methicillin resistant Staphylococcus aureus, Patient may be an isolation risk.        Isolated from Aerobic and Anaerobic Bottles     Gram Stain Aerobic Bottle Gram positive cocci      Anaerobic  Bottle Gram positive cocci    Susceptibility        Staphylococcus aureus, MRSA      MATT      Gentamicin Susceptible      Oxacillin Resistant      Rifampin Susceptible      Vancomycin Susceptible                       Susceptibility Comments       Staphylococcus aureus, MRSA    This isolate is presumed to be clindamycin resistant based on detection of inducible clindamycin resistance.  Clindamycin may still be effective in some patients.               Blood Culture ID, PCR - Blood, Arm, Left [[id number]]  (Abnormal) Collected: 03/26/24 1346    Lab Status: Final result Specimen: Blood from Arm, Left Updated: 03/27/24 0426     BCID, PCR Staph aureus. mecA/C and MREJ (methicillin resistance gene) detected. Identification by BCID2 PCR.     BOTTLE TYPE Aerobic Bottle    Narrative:      Infectious disease consultation is highly recommended to rule out distant foci of infection.             Interpretation Summary         The study is technically difficult for diagnosis.  If there is concern for possible infective endocarditis, recommend transesophageal echocardiogram to better visualize the valves.    Left ventricular systolic function is normal. Left ventricular ejection fraction appears to be 61 - 65%.    Left ventricular wall thickness is consistent with mild concentric hypertrophy    Left ventricular diastolic function is consistent with (grade III w/high LAP) fixed restrictive pattern.    Mildly reduced right ventricular systolic function noted.    The left atrial cavity is mildly dilated.    There is mild calcification of the aortic valve. No aortic valve regurgitation is present. No hemodynamically significant aortic valve stenosis is present.       Signed    Electronically signed by Omkar Charles DO on 4/2/24 at 1412 CDT       Radiology:         Abdominal films above.  Patient with large amount of stool noted.    Imaging Results (Last 72 Hours)       Procedure Component Value Units Date/Time    XR  Abdomen KUB [[id number]] Collected: 04/03/24 1345     Updated: 04/03/24 1349    Narrative:      EXAMINATION: XR ABDOMEN KUB- 4/3/2024 1:45 PM     HISTORY: abdominal distention; E11.628-Type 2 diabetes mellitus with  other skin complications; L08.9-Local infection of the skin and  subcutaneous tissue, unspecified; E11.65-Type 2 diabetes mellitus with  hyperglycemia; Z74.09-Other reduced mobility.     REPORT: Supine imaging of the abdomen was performed.     COMPARISON: KUB 10/7/2023.     A large amount of stool seen within the transverse colon and flexures,  likely representing constipation. No evidence of bowel obstruction is  identified. There are no definite urinary tract calculi. Cholecystectomy  clips are present. No acute osseous abnormality is identified.       Impression:      Extensive constipation within the transverse colon and  flexures.     This report was signed and finalized on 4/3/2024 1:46 PM by Dr. Percy Cesar MD.                   Active Hospital Problems    Diagnosis     **Sepsis     Diabetic foot infection     Chronic kidney disease (CKD), stage IV (severe)     Chronic diastolic heart failure     BPH without obstruction/lower urinary tract symptoms     Diabetic polyneuropathy associated with type 2 diabetes mellitus     Anemia due to chronic kidney disease     Essential hypertension     Type 2 diabetes mellitus with hyperglycemia, with long-term current use of insulin        IMPRESSION:  MRSA bacteremia-blood cultures + March 26 and March 29.  Secondary to infected left foot with wounds and associated cellulitis on admission.  Blood culture March 30 are negative final and blood cultures from April 1 negative at 3 days  Chronic kidney disease stage IIIb-with acute kidney injury-improved and bumex restarted 4/3  Type 2 diabetes mellitus-poorly controlled with hemoglobin A1c of 10.7 this admission-  History of GI bleed-hemoglobin stable  Abdominal pain likely related to extensive constipation.   Started on lactulose yesterday  MRSA nasal screen positive      RECOMMENDATION:   Continue linezolid-  Given lack of persistent bacteremia, do not feel like we need to proceed with YANIV at this time.  Continue to monitor CBC on linezolid.  Wound care per Dr. Cerrato's orders  Continue to monitor surveillance blood culture from April 1 until complete.  Glucose management per attending  Bowel regiment as per orders    Currently plan for at least 2 weeks of linezolid with start date after clearance of blood cultures-currently March 30.  Should be able to transition to oral linezolid given increased bioavailability.         Julia Adrian MD  04/04/24  08:22 CDT

## 2024-04-04 NOTE — PLAN OF CARE
Problem: Adult Inpatient Plan of Care  Goal: Plan of Care Review  Outcome: Ongoing, Progressing  Flowsheets (Taken 4/4/2024 0441)  Progress: no change  Plan of Care Reviewed With: patient  Outcome Evaluation: Pt complains of pain, see MAR. No complaints of nausea. Multiple things given for constipation. Pt has had 2 BMs this morning. Up stand by assist with walker. Pt has ambulated to the bathroom a lot this shift. Up in chair. Bed/chair alarms set. Accu check. Safety maintained.

## 2024-04-04 NOTE — THERAPY TREATMENT NOTE
Patient Name: Erick Luong  : 1955    MRN: [id number]                              Today's Date: 2024       Admit Date: 3/26/2024    Visit Dx:     ICD-10-CM ICD-9-CM   1. Diabetic foot infection  E11.628 250.80    L08.9 686.9   2. Poorly controlled diabetes mellitus  E11.65 250.00   3. Impaired functional mobility and activity tolerance [Z74.09]  Z74.09 V49.89     Patient Active Problem List   Diagnosis    Obesity, unspecified obesity severity, unspecified obesity type    Essential hypertension    Type 2 diabetes mellitus with hyperglycemia, with long-term current use of insulin    Nonsmoker    Anemia due to chronic kidney disease    Class 3 severe obesity due to excess calories with body mass index (BMI) of 40.0 to 44.9 in adult    Anasarca    Sleep apnea with use of continuous positive airway pressure (CPAP)    Medically noncompliant    Diabetic ulcer of left midfoot associated with type 2 diabetes mellitus, with fat layer exposed    Diabetic polyneuropathy associated with type 2 diabetes mellitus    Spinal stenosis in cervical region    Degeneration of cervical intervertebral disc    Cervical radiculopathy    Degeneration of lumbar or lumbosacral intervertebral disc    Cervical myelopathy    Bilateral carpal tunnel syndrome    CAD (coronary artery disease)    GERD without esophagitis    BPH without obstruction/lower urinary tract symptoms    Stage 3b chronic kidney disease    Chronic diastolic heart failure    Type 2 myocardial infarction due to heart failure    Left carpal tunnel syndrome    Syncope and collapse, recurrent episodes    Poorly-controlled hypertension    Rhinovirus    Peripheral vascular disease    Chronic kidney disease (CKD), stage IV (severe)    Diabetic foot infection    Sepsis     Past Medical History:   Diagnosis Date    Arthritis     Autonomic disease     CAD (coronary artery disease) 2017    Cervical radiculopathy 2021    Chronic constipation with acute  exaccerbation 05/10/2021    Coronary artery disease     Degeneration of cervical intervertebral disc 08/11/2021    Diabetes mellitus     Diabetic foot ulcer 08/31/2020    Diabetic polyneuropathy associated with type 2 diabetes mellitus 01/18/2021    Elevated cholesterol     Gastroesophageal reflux disease 05/13/2019    Headache     HTN (hypertension), benign 05/03/2017    Hyperlipidemia     Hypertension     Mixed hyperlipidemia 02/07/2017    Multiple lung nodules 01/26/2020    5mm, 9 mm RLL identified 1/2020, not present 10/2019.    Myocardial infarction     Osteomyelitis 01/22/2020    Osteomyelitis of fifth toe of right foot 10/07/2019    Pancreatitis     Persistent insomnia 01/20/2020    Renal disorder     Sleep apnea 02/06/2017    Sleep apnea with use of continuous positive airway pressure (CPAP)     NON-COMPLIANT    Slow transit constipation 01/16/2019    Spinal stenosis in cervical region 09/16/2021    Vitamin D deficiency 03/02/2021     Past Surgical History:   Procedure Laterality Date    ABDOMINAL SURGERY      AMPUTATION FOOT / TOE Left 10/2021    5th digit     ANTERIOR CERVICAL DISCECTOMY W/ FUSION N/A 8/5/2022    Procedure: CERVICAL DISCECTOMY ANTERIOR WITH FUSION C5-6 with possible plating of C5-7 with neuromonitoring and 1 c-arm;  Surgeon: Karel Soliz MD;  Location:  [facility] OR;  Service: Neurosurgery;  Laterality: N/A;    APPENDECTOMY      BACK SURGERY      CARDIAC CATHETERIZATION Left 02/08/2021    Procedure: Left Heart Cath w poss intervention left anatomical snuff box acess;  Surgeon: Omkar Charles DO;  Location:  [facility]ATH [facility]VAS[facility]ATION;  Service: Cardiology;  Laterality: Left;    CARDIAC SURGERY      CATARACT EXTRACTION      CERVICAL SPINE SURGERY      COLONOSCOPY N/A 01/31/2017    Normal exam repeat in 5 years    COLONOSCOPY N/A 02/11/2019    Mild acute inflammation    COLONOSCOPY W/ POLYPECTOMY  03/04/2014    Hyperplastic polyp    CORONARY ARTERY BYPASS GRAFT  10/2015     ENDOSCOPY  04/13/2011    Gastritis with hemorrhage    ENDOSCOPY N/A 05/05/2017    Normal exam    ENDOSCOPY N/A 02/11/2019    Gastritis    ENDOSCOPY N/A 09/01/2020    Non-erosive gastritis with hemorrhage    ENDOSCOPY N/A 02/10/2021    Esophagitis    FOOT SURGERY Left     INCISION AND DRAINAGE OF WOUND Left 09/2007    spider bite      General Information       Row Name 04/04/24 1130          OT Time and Intention    Document Type therapy note (daily note)  -LR     Mode of Treatment occupational therapy  -       Row Name 04/04/24 1130          General Information    Patient Profile Reviewed yes  -LR     Existing Precautions/Restrictions fall  WBAT L LE with CAM boot  -LR               User Key  (r) = Recorded By, (t) = Taken By, (c) = Cosigned By      Initials Name Provider Type    LR Tabitha Webb OTR/L Occupational Therapist                     Mobility/ADL's       Row Name 04/04/24 1130          Bed Mobility    Comment, (Bed Mobility) sitting in chair  -       Row Name 04/04/24 1130          Transfers    Transfers sit-stand transfer;stand-sit transfer;toilet transfer  -       Row Name 04/04/24 1130          Sit-Stand Transfer    Sit-Stand [place] (Transfers) contact guard;verbal cues  -LR     Assistive Device (Sit-Stand Transfers) walker, front-wheeled  -LR       Row Name 04/04/24 1130          Stand-Sit Transfer    Stand-Sit [place] (Transfers) contact guard;verbal cues  -LR     Assistive Device (Stand-Sit Transfers) walker, front-wheeled  -LR       Row Name 04/04/24 1130          Toilet Transfer    Type (Toilet Transfer) sit-stand;stand-sit  -LR     [place] Level (Toilet Transfer) contact guard;verbal cues  -LR     Assistive Device (Toilet Transfer) commode, bedside without drop arms;walker, front-wheeled  -LR       Row Name 04/04/24 1130          Functional Mobility    Functional Mobility- Ind. Level contact guard assist  -LR     Functional Mobility- Device walker, front-wheeled  -LR        Row Name 04/04/24 1130          Activities of Daily Living    BADL Assessment/Intervention grooming;lower body dressing  -LR       Row Name 04/04/24 1130          Lower Body Dressing Assessment/Training    [place] Level (Lower Body Dressing) doff;don;dependent (less than 25% patient effort)  -LR     Position (Lower Body Dressing) supported sitting  -LR     Comment, (Lower Body Dressing) CAM boot  -LR       Row Name 04/04/24 1130          Grooming Assessment/Training    [place] Level (Grooming) oral care regimen;wash face, hands;contact guard assist;set up;verbal cues  -LR     Position (Grooming) sink side;supported standing  -LR               User Key  (r) = Recorded By, (t) = Taken By, (c) = Cosigned By      Initials Name Provider Type    Tabitha Marquez OTR/L Occupational Therapist                   Obj/Interventions       Row Name 04/04/24 1130          Balance    Balance Assessment sitting static balance;sitting dynamic balance;standing static balance;standing dynamic balance  -LR     Static Sitting Balance standby assist  -LR     Dynamic Sitting Balance standby assist  -LR     Position, Sitting Balance supported;sitting in chair  -LR     Static Standing Balance contact guard  -LR     Dynamic Standing Balance contact guard  -LR     Position/Device Used, Standing Balance supported;walker, front-wheeled  -LR               User Key  (r) = Recorded By, (t) = Taken By, (c) = Cosigned By      Initials Name Provider Type    Tabitha Marquez, JENR/L Occupational Therapist                   Goals/Plan    No documentation.                  Clinical Impression       Row Name 04/04/24 1130          Pain Assessment    Pretreatment Pain Rating 8/10  -LR     Posttreatment Pain Rating 8/10  -LR     Pain Location - abdomen  -LR     Pain Intervention(s) Medication (See MAR);Repositioned;Ambulation/increased activity;Nursing Notified  -LR       Row Name 04/04/24 1130          Plan of Care Review    Plan  of Care Reviewed With patient  -LR     Progress no change  -LR     Outcome Evaluation OT tx completed. Pt presents with chair alarm sounding, reporting desire to go home. Pt restless this date. Pt completed fxl mobility using FWW with CGA. Pt reported fatigue and [place] [facility] [place]. Pt completed sit<>stand [place] [facility] with CGA. Pt completed grooming standing sink-side with CGA required and cues for initiation and tech. Pt with continued confusion, unable to recall need for CAM boot to L LE when up despite recent education and cues. Pt left sitting in chair with sitter present in room and chair alarm in place. Continue OT POC in order to increase pt safety and I during ADLs, fxl mobility, and fxl t/f's.  -LR       Row Name 04/04/24 1130          Vital Signs    O2 Delivery Pre Treatment room air  -LR     O2 Delivery Intra Treatment room air  -LR     O2 Delivery Post Treatment room air  -LR     Pre Patient Position Standing  -LR     Intra Patient Position Standing  -LR     Post Patient Position Sitting  -LR       Row Name 04/04/24 1130          Positioning and Restraints    Pre-Treatment Position standing in room  -LR     Post Treatment Position chair  -LR     In Chair notified nsg;sitting;call light within reach;encouraged to call for assist;exit alarm on;with other staff  -LR               User Key  (r) = Recorded By, (t) = Taken By, (c) = Cosigned By      Initials Name Provider Type    LR Tabitha Webb OTR/L Occupational Therapist                   Outcome Measures       Row Name 04/04/24 1136          How much help from another is currently needed...    Putting on and taking off regular lower body clothing? 2  -LR     Bathing (including washing, rinsing, and drying) 2  -LR     Toileting (which includes using toilet bed pan or urinal) 2  -LR     Putting on and taking off regular upper body clothing 3  -LR     Taking care of personal grooming (such as brushing teeth) 3  -LR      Eating meals 3  -LR     AM-PAC 6 Clicks Score (OT) 15  -LR       Row Name 04/04/24 0900 04/04/24 0825       How much help from another person do you currently need...    Turning from your back to your side while in flat bed without using bedrails? 4  -AH 4  -SM    Moving from lying on back to sitting on the side of a flat bed without bedrails? 4  -AH 4  -SM    Moving to and from a bed to a chair (including a wheelchair)? 4  -AH 4  -SM    Standing up from a chair using your arms (e.g., wheelchair, bedside chair)? 4  -AH 4  -SM    Climbing 3-5 steps with a railing? 3  -AH 3  -SM    To walk in hospital room? 3  -AH 3  -SM    AM-PAC 6 Clicks Score (PT) 22  - 22  -SM    Highest Level of Mobility Goal 7 --> Walk 25 feet or more  - 7 --> Walk 25 feet or more  -SM      Row Name 04/04/24 1130 04/04/24 0900       Functional Assessment    Outcome Measure Options AM-PAC 6 Clicks Daily Activity (OT)  - AM-PAC 6 Clicks Basic Mobility (PT)  -              User Key  (r) = Recorded By, (t) = Taken By, (c) = Cosigned By      Initials Name Provider Type     Cehry Rosado PTA Physical Therapist Assistant    Racquel Austin, RN Registered Nurse    Tabitha Marquez, OTR/L Occupational Therapist                    Occupational Therapy Education       Title: PT OT SLP Therapies (In Progress)       Topic: Occupational Therapy (In Progress)       Point: ADL training (In Progress)       Description:   Instruct learner(s) on proper safety adaptation and remediation techniques during self care or transfers.   Instruct in proper use of assistive devices.                  Learning Progress Summary             Patient Acceptance, E,D, NR,NL by LR at 4/4/2024 1325    Acceptance, E,D, VU,NR by LR at 4/3/2024 1119    Acceptance, E, VU,NR by LS at 4/2/2024 1128    Acceptance, E, VU,NR by  at 3/29/2024 1424    Comment: Role of OT, OT POC, fall prevention, benefits of ambulation, d/c recommendations                         Point:  Home exercise program (Not Started)       Description:   Instruct learner(s) on appropriate technique for monitoring, assisting and/or progressing therapeutic exercises/activities.                  Learner Progress:  Not documented in this visit.              Point: Precautions (In Progress)       Description:   Instruct learner(s) on prescribed precautions during self-care and functional transfers.                  Learning Progress Summary             Patient Acceptance, E,D, NR,NL by LR at 4/4/2024 1325    Acceptance, E,D, VU,NR by LR at 4/3/2024 1119    Acceptance, E, VU,NR by LS at 4/2/2024 1128    Acceptance, E, VU,NR by  at 3/29/2024 1424    Comment: Role of OT, OT POC, fall prevention, benefits of ambulation, d/c recommendations                         Point: Body mechanics (In Progress)       Description:   Instruct learner(s) on proper positioning and spine alignment during self-care, functional mobility activities and/or exercises.                  Learning Progress Summary             Patient Acceptance, E,D, NR,NL by LR at 4/4/2024 1325    Acceptance, E,D, VU,NR by LR at 4/3/2024 1119    Acceptance, E, VU,NR by LS at 4/2/2024 1128    Acceptance, E, VU,NR by  at 3/29/2024 1424    Comment: Role of OT, OT POC, fall prevention, benefits of ambulation, d/c recommendations                                         User Key       Initials Effective Dates Name Provider Type Discipline     06/20/22 -  Alesha Barrett OTR/L Occupational Therapist OT    LR 04/25/23 -  Tabitha Webb OTR/L Occupational Therapist OT     01/09/24 -  Yulia Pritchett OT Student OT Student OT                  OT Recommendation and Plan     Plan of Care Review  Plan of Care Reviewed With: patient  Progress: no change  Outcome Evaluation: OT tx completed. Pt presents with chair alarm sounding, reporting desire to go home. Pt restless this date. Pt completed fxl mobility using FWW with CGA. Pt reported fatigue and requested  se[place] [facility] [place]. Pt completed sit<>stand t/f at [facility] [place] with [place]GA. Pt completed grooming standing sink-side with CGA required and cues for initiation and tech. Pt with continued confusion, unable to recall need for CAM boot to L LE when up despite recent education and cues. Pt left sitting in chair with sitter present in room and chair alarm in place. Continue OT POC in order to increase pt safety and I during ADLs, fxl mobility, and fxl t/f's.     Time Calculation:         Time Calculation- OT       Row Name 04/04/24 1130 04/04/24 0932          Time Calculation- OT    OT Start Time 1130  -LR --     OT Stop Time 1200  -LR --     OT Time Calculation (min) 30 min  -LR --     Total Timed Code Minutes- OT 30 minute(s)  -LR --     OT Received On 04/04/24  -LR --        Timed Charges    57540 - Gait Training Minutes  -- 15  -AH     04009 - OT Self Care/Mgmt Minutes 30  -LR --        Total Minutes    Timed Charges Total Minutes 30  -LR 15  -AH      Total Minutes 30  -LR 15  -AH               User Key  (r) = Recorded By, (t) = Taken By, (c) = Cosigned By      Initials Name Provider Type     Chery Rosado PTA Physical Therapist Assistant    LR Tabitha Webb OTR/L Occupational Therapist                  Therapy Charges for Today       Code Description Service Date Service Provider Modifiers Qty    86294787408 HC OT SELF CARE/MGMT/TRAIN EA 15 MIN 4/3/2024 Tabitha Webb OTR/L GO 3    78022209054 HC OT SELF CARE/MGMT/TRAIN EA 15 MIN 4/4/2024 Tabitha Webb OTR/L GO 2                 ALEXANDER Dick/EDVIN  4/4/2024

## 2024-04-04 NOTE — PROGRESS NOTES
Nephrology ([facility]) Progress Note      Patient:  Erick Luong  YOB: 1955  Date of Service: 4/4/2024  MRN: [id number]   Acct: [id number]   Primary Care Physician: Del Shetty MD  Advance Directive:   Code Status and Medical Interventions:   Ordered at: 03/26/24 1815     Level Of Support Discussed With:    Health Care Surrogate     Code Status (Patient has no pulse and is not breathing):    CPR (Attempt to Resuscitate)     Medical Interventions (Patient has pulse or is breathing):    Full Support     Admit Date: 3/26/2024       Hospital Day: 9  Referring Provider: No ref. provider found      Patient personally seen and examined.  Complete chart including Consults, Notes, Operative Reports, Labs, Cardiology, and Radiology studies reviewed as able.        Subjective:  Erick Luong is a 68 y.o. male for whom we were consulted for evaluation and treatment of acute kidney injury.  He has stage IIIb chronic kidney disease baseline, follows with Dr Lomas in our office.  Baseline creatinine approximately 1.8. He has history of insulin-dependent type 2 diabetes, hypertension, abdominal obesity, obstructive sleep apnea, diabetic foot ulcer and coronary artery disease.   Patient presented to ER on 3/26 with altered mental status. Had debridement of ongoing wound on left foot the day prior. Left foot warm and erythremic on arrival to ER. Noted to have elevated blood glucose over 400. Initial creatinine of 1.9.  Admitted to medical floor for sepsis due to left foot wound. Patient has been agitated and confused during the admission, requiring wrist restraints due to pulling at lines and tubes. Renal function and mentation have been improving.    Today is awake and alert. No new complaint. Overnight he got out of bed unattended and sustained a fall. Superficial abrasion to right knee but no other injury was noted. Sitter now present in room. Urine output nonoliguric      Allergies:  Cefepime, Bactrim [sulfamethoxazole-trimethoprim], Vancomycin, Zolpidem, Zolpidem tartrate, and Metronidazole    Home Meds:  Medications Prior to Admission   Medication Sig Dispense Refill Last Dose    amitriptyline (ELAVIL) 25 MG tablet Take 2 tablets by mouth Daily at bedtime. (Patient not taking: Reported on 3/27/2024) 60 tablet 1 Not Taking    apixaban (ELIQUIS) 5 MG tablet tablet Take 1 tablet by mouth Every 12 (Twelve) Hours.       ascorbic acid (VITAMIN C) 1000 MG tablet Take 1 tablet by mouth Daily. 30 tablet 3     Aspirin 81 MG capsule Take 81 mg by mouth Daily.       bumetanide (BUMEX) 1 MG tablet Take 1 tablet every day by oral route for 30 days. 30 tablet 0     bumetanide (BUMEX) 2 MG tablet Take 1 tablet by mouth Daily. Take 2 tablets in morning;1 tablet at night (Patient not taking: Reported on 3/27/2024)   Not Taking    busPIRone (BUSPAR) 10 MG tablet Take 1 tablet by mouth 3 (Three) Times a Day.       calcitriol (ROCALTROL) 0.5 MCG capsule Take 1 capsule by mouth Daily. 90 capsule 4     calcitriol (ROCALTROL) 0.5 MCG capsule Take 1 capsule every day by oral route for 90 days. (Patient not taking: Reported on 3/27/2024) 90 capsule 4 Not Taking    carvedilol (COREG) 3.125 MG tablet Take 1 tablet twice a day by oral route. 60 tablet 4     carvedilol (COREG) 6.25 MG tablet Take 1 tablet by mouth 2 (Two) Times a Day. (Patient not taking: Reported on 3/27/2024) 180 tablet 4 Not Taking    Cholecalciferol (D-5000) 125 MCG (5000 UT) tablet Take 1 tablet by mouth Daily Before Lunch. 30 tablet 2     cloNIDine (CATAPRES) 0.1 MG tablet Take 1 tablet by mouth Every 12 (Twelve) Hours. (Patient not taking: Reported on 3/27/2024) 60 tablet 2 Not Taking    Diclofenac Sodium (VOLTAREN) 1 % gel gel Apply 2 g topically to the appropriate area as directed 4 (Four) Times a Day As Needed. 300 g 11     Diclofenac Sodium (VOLTAREN) 1 % gel gel Apply 2 g topically to the appropriate area as directed 4 (Four)  Times a Day. (Patient not taking: Reported on 3/27/2024) 300 g 11 Not Taking    donepezil (ARICEPT) 10 MG tablet Take 1 tablet by mouth Daily. (Patient not taking: Reported on 3/27/2024) 90 tablet 4 Not Taking    Dulaglutide (Trulicity) 4.5 MG/0.5ML solution pen-injector Inject 0.5 mL under the skin into the appropriate area as directed 1 (One) Time Per Week. (Patient not taking: Reported on 3/27/2024) 2 mL 11 Not Taking    DULoxetine (CYMBALTA) 60 MG capsule Take 1 capsule by mouth Daily. 90 capsule 4     DULoxetine (CYMBALTA) 60 MG capsule Take 1 capsule by mouth Daily. (Patient not taking: Reported on 3/27/2024) 90 capsule 4 Not Taking    DULoxetine (CYMBALTA) 60 MG capsule Take 1 capsule by mouth Daily. (Patient not taking: Reported on 3/27/2024) 90 capsule 4 Not Taking    empagliflozin (JARDIANCE) 25 MG tablet tablet Take 1 tablet by mouth Daily. (Patient not taking: Reported on 3/27/2024) 30 tablet 2 Not Taking    famotidine (PEPCID) 20 MG tablet Take 1 tablet twice a day by oral route. 180 tablet 4     fluticasone (FLONASE) 50 MCG/ACT nasal spray 1 spray into the nostril(s) as directed by provider Daily. (Patient taking differently: 1 spray into the nostril(s) as directed by provider 2 (Two) Times a Day.) 16 g 1     HYDROcodone-acetaminophen (NORCO) 7.5-325 MG per tablet Take 1 tablet by mouth 2 (Two) Times a Day As Needed. (Patient not taking: Reported on 3/27/2024) 40 tablet 0 Not Taking    Insulin Glargine (Lantus SoloStar) 100 UNIT/ML injection pen Inject 20 Units under the skin into the appropriate area as directed Every Evening. 15 mL 3     Insulin Regular Human, Conc, (HumuLIN R U-500 KwikPen) 500 UNIT/ML solution pen-injector CONCENTRATED injection Inject 120 Units under the skin into the appropriate area as directed 2 (Two) Times a Day with breakfast and dinner. (Patient not taking: Reported on 3/27/2024) 18 mL 5 Not Taking    Insulin Regular Human, Conc, (HumuLIN R U-500 KwikPen) 500 UNIT/ML  solution pen-injector CONCENTRATED injection Inject 40 Units under the skin into the appropriate area with regular meals AND 40 Units with large meals. 54 mL 3     isosorbide mononitrate (IMDUR) 30 MG 24 hr tablet Take 1 tablet by mouth Daily.       magnesium hydroxide (Milk of Magnesia) 400 MG/5ML suspension Take 30 mL every day by oral route for 30 days. 900 mL 0     magnesium hydroxide (Milk of Magnesia) 400 MG/5ML suspension Take 30mL by mouth once daily for 30 days. (Patient not taking: Reported on 3/27/2024) 900 mL 0 Not Taking    melatonin 3 MG tablet Take 1 tablet by mouth At Night As Needed for Sleep.       methylcellulose (Citrucel) oral powder Mix 5g as directed and drink twice daily for 90 days. 900 g 10     metoclopramide (REGLAN) 5 MG tablet Take 1 tablet by mouth 3 times a day.       midodrine (PROAMATINE) 2.5 MG tablet Take 1 tablet by mouth 3 (Three) Times a Day Before Meals.       nitroglycerin (NITROSTAT) 0.4 MG SL tablet Dissolve 1 tablet by mouth every 5 minutes as needed for chest pain; MAX 3 tabs/24 hours.  If no improvement after 3 tabs go to ER 25 tablet 0     ondansetron (ZOFRAN) 4 MG tablet Take 1 tablet by mouth Every 6 (Six) Hours As Needed for Nausea or Vomiting.       oxyCODONE (ROXICODONE) 10 MG tablet Take 1 tablet by mouth twice a day as needed 60 tablet 0     pantoprazole (Protonix) 40 MG EC tablet Take 1 tablet by mouth 2 (Two) Times a Day. (Patient not taking: Reported on 3/27/2024) 180 tablet 4 Not Taking    polyethylene glycol (MIRALAX) 17 g packet Take 17 g by mouth Daily. Obtain OTC       prazosin (MINIPRESS) 1 MG capsule Take 1 capsule by mouth every night at bedtime. 30 capsule 2     pregabalin (LYRICA) 100 MG capsule Take 2 capsules by mouth Every Night.       pregabalin (LYRICA) 50 MG capsule Take 1 capsule by mouth Daily.       rosuvastatin (CRESTOR) 10 MG tablet Take 1 tablet by mouth Daily.       sennosides-docusate (PERICOLACE) 8.6-50 MG per tablet Take 1 tablet by  mouth Every Night. Obtain OTC       sennosides-docusate (PERICOLACE) 8.6-50 MG per tablet Take 1 tablet by mouth every night at bedtime. (Patient not taking: Reported on 3/27/2024) 30 tablet 2 Not Taking    sodium hypochlorite (DAKIN'S 1/4 STRENGTH) 0.125 % solution topical solution 0.125% Apply to affected area twice daily (Patient not taking: Reported on 3/27/2024) 473 mL 3 Not Taking    sodium hypochlorite (DAKIN'S 1/4 STRENGTH) 0.125 % solution topical solution 0.125% Apply to affected area twice daily as directed (Patient not taking: Reported on 3/27/2024) 473 mL 5 Not Taking    sodium hypochlorite (DAKIN'S) 0.25 % topical solution Use to wound daily as instructed 473 mL 1     sucralfate (CARAFATE) 1 g tablet Take 1 tablet by mouth 3 times a day.       tamsulosin (FLOMAX) 0.4 MG capsule 24 hr capsule Take 1 capsule by mouth Daily. 90 capsule 1     timolol (TIMOPTIC) 0.5 % ophthalmic solution Administer 1 drop to both eyes 2 (Two) Times a Day.       valsartan (DIOVAN) 40 MG tablet Take 1 tablet by mouth Daily.       zinc sulfate (ZINCATE) 50 MG capsule Take 1 capsule by mouth Daily.          Medicines:  Current Facility-Administered Medications   Medication Dose Route Frequency Provider Last Rate Last Admin    acetaminophen (TYLENOL) tablet 650 mg  650 mg Oral Q4H PRN Raquel Duncan APRN   650 mg at 04/04/24 0002    Or    acetaminophen (TYLENOL) 160 MG/5ML oral solution 650 mg  650 mg Oral Q4H PRN Raquel Duncan APRN   650 mg at 03/27/24 2109    Or    acetaminophen (TYLENOL) suppository 650 mg  650 mg Rectal Q4H PRN Raquel Duncan APRN        apixaban (ELIQUIS) tablet 5 mg  5 mg Oral Q12H Raquel Duncan APRN   5 mg at 04/04/24 0849    ascorbic acid (VITAMIN C) tablet 1,000 mg  1,000 mg Oral Daily Rene Maloney MD   1,000 mg at 04/04/24 0849    aspirin EC tablet 81 mg  81 mg Oral Daily Rene Maloney MD   81 mg at 04/04/24 0849    sennosides-docusate (PERICOLACE) 8.6-50 MG per tablet 1  tablet  1 tablet Oral BID Julia Adrian MD   1 tablet at 04/04/24 0847    And    polyethylene glycol (MIRALAX) packet 17 g  17 g Oral Daily Julia Adrian MD        And    bisacodyl (DULCOLAX) EC tablet 5 mg  5 mg Oral Daily PRN Julia Adrian MD        And    bisacodyl (DULCOLAX) suppository 10 mg  10 mg Rectal Daily PRN Julia Adrian MD        bumetanide (BUMEX) tablet 1 mg  1 mg Oral Daily Stewart Alavrez, APRN   1 mg at 04/04/24 0847    calcitriol (ROCALTROL) capsule 0.5 mcg  0.5 mcg Oral Daily Rene Maloney MD   0.5 mcg at 04/04/24 0848    carvedilol (COREG) tablet 3.125 mg  3.125 mg Oral BID With Meals Rene Maloney MD   3.125 mg at 04/03/24 1709    dextrose (D50W) (25 g/50 mL) IV injection 25 g  25 g Intravenous Q15 Min PRN Raquel Duncan, APRN        dextrose (GLUTOSE) oral gel 15 g  15 g Oral Q15 Min PRN Raquel Duncan APRN   15 g at 03/27/24 1710    Diclofenac Sodium (VOLTAREN) 1 % gel 2 g  2 g Topical 4x Daily PRN Rene Maloney MD        dicyclomine (BENTYL) capsule 20 mg  20 mg Oral TID PRN Rene Maloney MD   20 mg at 04/02/24 2234    donepezil (ARICEPT) tablet 10 mg  10 mg Oral Daily Raquel Duncan APRN   10 mg at 04/04/24 0849    famotidine (PEPCID) tablet 20 mg  20 mg Oral Daily Rene Maloney MD   20 mg at 04/04/24 0847    glucagon (GLUCAGEN) injection 1 mg  1 mg Intramuscular Q15 Min PRN Raquel Duncan, APRN        haloperidol lactate (HALDOL) injection 5 mg  5 mg Intravenous Q6H PRN Rene Maloney MD   5 mg at 04/03/24 0026    insulin detemir (LEVEMIR) injection 30 Units  30 Units Subcutaneous Nightly Raquel Duncan APRN   30 Units at 04/02/24 2049    Insulin Lispro (humaLOG) injection 4-24 Units  4-24 Units Subcutaneous 4x Daily AC & at Bedtime Raquel Duncan APRN   8 Units at 04/04/24 0847    insulin regular (humuLIN R,novoLIN R) injection 10 Units  10 Units Subcutaneous TID AC Steve De Jesus MD    10 Units at 04/04/24 0847    isosorbide mononitrate (IMDUR) 24 hr tablet 30 mg  30 mg Oral Q24H Rene Maloney MD   30 mg at 04/04/24 0848    lactulose solution 20 g  20 g Oral TID Latanya Paez MD   20 g at 04/04/24 0853    Linezolid (ZYVOX) 600 mg 300 mL  600 mg Intravenous Q12H Julia Adrian  mL/hr at 04/04/24 0002 600 mg at 04/04/24 0002    magnesium hydroxide (MILK OF MAGNESIA) suspension 10 mL  10 mL Oral Daily PRN Rene Maloney MD   10 mL at 04/03/24 0414    melatonin tablet 6 mg  6 mg Oral Nightly Rene Maloney MD   6 mg at 04/03/24 2053    midodrine (PROAMATINE) tablet 2.5 mg  2.5 mg Oral TID AC Rene Maloney MD   2.5 mg at 04/04/24 0848    Morphine sulfate (PF) injection 2 mg  2 mg Intravenous Q3H PRN Rene Maloney MD   2 mg at 04/04/24 0601    mupirocin (BACTROBAN) 2 % nasal ointment 1 Application  1 Application Each Nare BID Nadia Polo APRN   1 Application at 04/04/24 0849    nitroglycerin (NITROSTAT) SL tablet 0.4 mg  0.4 mg Sublingual Q5 Min PRN Raquel Duncan, APRN        ondansetron ODT (ZOFRAN-ODT) disintegrating tablet 4 mg  4 mg Oral Q6H PRN Raquel Duncan APRN   4 mg at 04/03/24 0537    Or    ondansetron (ZOFRAN) injection 4 mg  4 mg Intravenous Q6H PRN Raquel Duncan, APRN   4 mg at 03/29/24 1837    oxyCODONE (ROXICODONE) immediate release tablet 10 mg  10 mg Oral BID PRN Raquel Duncan, APRN   10 mg at 04/03/24 2053    pregabalin (LYRICA) capsule 100 mg  100 mg Oral Nightly Rene Maloney MD   100 mg at 04/03/24 2053    pregabalin (LYRICA) capsule 50 mg  50 mg Oral Daily Rene Maloney MD   50 mg at 04/04/24 0849    rosuvastatin (CRESTOR) tablet 10 mg  10 mg Oral Nightly Rene Maloney MD   10 mg at 04/03/24 1952    sodium chloride 0.9 % flush 10 mL  10 mL Intravenous PRN Steve De Jesus MD        sodium chloride 0.9 % flush 10 mL  10 mL Intravenous Q12H Raquel Duncan APRN   10 mL at 04/03/24 0803     sodium chloride 0.9 % flush 10 mL  10 mL Intravenous PRN Raquel Duncan APRN        sodium chloride 0.9 % infusion 40 mL  40 mL Intravenous PRN Raquel Duncan, APRN        sucralfate (CARAFATE) 1 GM/10ML suspension 1 g  1 g Oral TID AC Rene Maloney MD   1 g at 04/04/24 0852    tamsulosin (FLOMAX) 24 hr capsule 0.4 mg  0.4 mg Oral Daily Rene Maloney MD   0.4 mg at 04/04/24 0849    timolol (TIMOPTIC) 0.25 % ophthalmic solution 1 drop  1 drop Both Eyes Q12H Rene Maloney MD   1 drop at 04/03/24 2056    vitamin D3 capsule 5,000 Units  5,000 Units Oral Daily Rene Maloney MD   5,000 Units at 04/04/24 0849    zinc sulfate (ZINCATE) capsule 220 mg  220 mg Oral Daily Rene Maloney MD   220 mg at 04/04/24 0849       Past Medical History:  Past Medical History:   Diagnosis Date    Arthritis     Autonomic disease     CAD (coronary artery disease) 02/06/2017    Cervical radiculopathy 09/16/2021    Chronic constipation with acute exaccerbation 05/10/2021    Coronary artery disease     Degeneration of cervical intervertebral disc 08/11/2021    Diabetes mellitus     Diabetic foot ulcer 08/31/2020    Diabetic polyneuropathy associated with type 2 diabetes mellitus 01/18/2021    Elevated cholesterol     Gastroesophageal reflux disease 05/13/2019    Headache     HTN (hypertension), benign 05/03/2017    Hyperlipidemia     Hypertension     Mixed hyperlipidemia 02/07/2017    Multiple lung nodules 01/26/2020    5mm, 9 mm RLL identified 1/2020, not present 10/2019.    Myocardial infarction     Osteomyelitis 01/22/2020    Osteomyelitis of fifth toe of right foot 10/07/2019    Pancreatitis     Persistent insomnia 01/20/2020    Renal disorder     Sleep apnea 02/06/2017    Sleep apnea with use of continuous positive airway pressure (CPAP)     NON-COMPLIANT    Slow transit constipation 01/16/2019    Spinal stenosis in cervical region 09/16/2021    Vitamin D deficiency 03/02/2021       Past Surgical  History:  Past Surgical History:   Procedure Laterality Date    ABDOMINAL SURGERY      AMPUTATION FOOT / TOE Left 10/2021    5th digit     ANTERIOR CERVICAL DISCECTOMY W/ FUSION N/A 2022    Procedure: CERVICAL DISCECTOMY ANTERIOR WITH FUSION C5-6 with possible plating of C5-7 with neuromonitoring and 1 c-arm;  Surgeon: Karel Soliz MD;  Location:  [facility] OR;  Service: Neurosurgery;  Laterality: N/A;    APPENDECTOMY      BACK SURGERY      CARDIAC CATHETERIZATION Left 2021    Procedure: Left Heart Cath w poss intervention left anatomical snuff box acess;  Surgeon: Omkar Charles DO;  Location:  [facility] CATH INVASIVE LOCATION;  Service: Cardiology;  Laterality: Left;    CARDIAC SURGERY      CATARACT EXTRACTION      CERVICAL SPINE SURGERY      COLONOSCOPY N/A 2017    Normal exam repeat in 5 years    COLONOSCOPY N/A 2019    Mild acute inflammation    COLONOSCOPY W/ POLYPECTOMY  2014    Hyperplastic polyp    CORONARY ARTERY BYPASS GRAFT  10/2015    ENDOSCOPY  2011    Gastritis with hemorrhage    ENDOSCOPY N/A 2017    Normal exam    ENDOSCOPY N/A 2019    Gastritis    ENDOSCOPY N/A 2020    Non-erosive gastritis with hemorrhage    ENDOSCOPY N/A 02/10/2021    Esophagitis    FOOT SURGERY Left     INCISION AND DRAINAGE OF WOUND Left 2007    spider bite       Family History  Family History   Problem Relation Age of Onset    Colon cancer Father     Heart disease Father     Colon cancer Sister     Colon polyps Sister     Alzheimer's disease Mother     Coronary artery disease Sister     Coronary artery disease Sister        Social History  Social History     Socioeconomic History    Marital status:    Tobacco Use    Smoking status: Former     Current packs/day: 0.00     Types: Cigarettes     Quit date:      Years since quittin.2    Smokeless tobacco: Never    Tobacco comments:     smoked in [employer]   Vaping Use    Vaping status: Never Used    Substance and Sexual Activity    Alcohol use: No    Drug use: No    Sexual activity: Defer       Review of Systems:  History obtained from chart review and the patient  General ROS: No fever or chills  Respiratory ROS: No cough, shortness of breath, wheezing  Cardiovascular ROS: No chest pain or palpitations  Gastrointestinal ROS: No abdominal pain or melena  Genito-Urinary ROS: No dysuria or hematuria  Psych ROS: No anxiety and depression  14 point ROS reviewed with the patient and negative except as noted above and in the HPI unless unable to obtain.    Objective:  Patient Vitals for the past 24 hrs:   BP Temp Temp src Pulse Resp SpO2 Weight   04/04/24 0845 131/59 97.6 °F (36.4 °C) Oral 62 16 100 % --   04/04/24 0449 125/63 97.5 °F (36.4 °C) Oral 68 16 97 % --   04/04/24 0100 -- -- -- -- -- -- 133 kg (293 lb 12.8 oz)   04/03/24 2353 147/67 97.7 °F (36.5 °C) Oral 100 16 97 % --   04/03/24 2120 157/67 98.2 °F (36.8 °C) Oral 87 18 93 % --   04/03/24 1946 152/65 98 °F (36.7 °C) Oral 74 16 100 % --   04/03/24 1650 153/71 97.4 °F (36.3 °C) Oral 73 18 98 % --   04/03/24 1154 140/68 97.4 °F (36.3 °C) Oral 60 18 95 % --       Intake/Output Summary (Last 24 hours) at 4/4/2024 1046  Last data filed at 4/4/2024 1032  Gross per 24 hour   Intake 360 ml   Output 1101 ml   Net -741 ml     General: awake/alert   Chest:  clear to auscultation bilaterally without respiratory distress  CVS: regular rate and rhythm  Abdominal: soft, nontender, positive bowel sounds  Extremities:  bilateral 1+ edema  Skin: warm and dry without rash      Labs:  Results from last 7 days   Lab Units 04/04/24  0514 04/03/24  0334 04/02/24  0617   WBC 10*3/mm3 5.64 6.54 7.59   HEMOGLOBIN g/dL 10.0* 9.0* 10.8*   HEMATOCRIT % 31.3* 28.9* 34.0*   PLATELETS 10*3/mm3 251 197 197         Results from last 7 days   Lab Units 04/04/24  0514 04/03/24  0334 04/02/24  0656 04/01/24  0402 03/31/24  0526 03/30/24  0620 03/29/24  0551   SODIUM mmol/L 139 139 139   < >  136 135* 138   POTASSIUM mmol/L 4.6 4.2 4.1   < > 3.6 3.5 3.3*   CHLORIDE mmol/L 102 104 104   < > 101 100 103   CO2 mmol/L 26.0 25.0 26.0   < > 23.0 24.0 23.0   BUN mg/dL 32* 37* 43*   < > 51* 51* 40*   CREATININE mg/dL 1.78* 1.76* 2.02*   < > 2.52* 2.74* 2.07*   CALCIUM mg/dL 10.5 9.3 9.8   < > 8.6 8.2* 8.4*   EGFR mL/min/1.73 41.3* 41.9* 35.5*   < > 27.2* 24.6* 34.5*   BILIRUBIN mg/dL  --   --   --   --  0.5 0.5 0.7   ALK PHOS U/L  --   --   --   --  110 95 81   ALT (SGPT) U/L  --   --   --   --  19 19 17   AST (SGOT) U/L  --   --   --   --  30 32 32   GLUCOSE mg/dL 185* 118* 106*   < > 201* 104* 120*    < > = values in this interval not displayed.       Radiology:   Imaging Results (Last 72 Hours)       Procedure Component Value Units Date/Time    XR Abdomen KUB [[id number]] Collected: 04/03/24 1345     Updated: 04/03/24 1349    Narrative:      EXAMINATION: XR ABDOMEN KUB- 4/3/2024 1:45 PM     HISTORY: abdominal distention; E11.628-Type 2 diabetes mellitus with  other skin complications; L08.9-Local infection of the skin and  subcutaneous tissue, unspecified; E11.65-Type 2 diabetes mellitus with  hyperglycemia; Z74.09-Other reduced mobility.     REPORT: Supine imaging of the abdomen was performed.     COMPARISON: KUB 10/7/2023.     A large amount of stool seen within the transverse colon and flexures,  likely representing constipation. No evidence of bowel obstruction is  identified. There are no definite urinary tract calculi. Cholecystectomy  clips are present. No acute osseous abnormality is identified.       Impression:      Extensive constipation within the transverse colon and  flexures.     This report was signed and finalized on 4/3/2024 1:46 PM by Dr. Percy Cesar MD.               Culture:  Blood Culture   Date Value Ref Range Status   03/26/2024 Staphylococcus aureus, MRSA (C)  Final     Comment:       Infectious disease consultation is highly recommended to rule out distant foci of  infection.  Methicillin resistant Staphylococcus aureus, Patient may be an isolation risk.   03/26/2024 Staphylococcus aureus, MRSA (C)  Final     Comment:       Infectious disease consultation is highly recommended to rule out distant foci of infection.  Methicillin resistant Staphylococcus aureus, Patient may be an isolation risk.         Assessment    Acute kidney injury, ATN--resolving  Baseline chronic kidney disease stage 3b  Type 2 diabetes, poorly controlled (A1c 10.8%)  Hypertension   Metabolic encephalopathy--improved  Anemia of CKD  Hypokalemia--improved  Sepsis due to infection of left foot wound    Plan:  Continue daily Bumex  Renal function stable  Monitor labs      Stewart Alvarez, SUSAN  4/4/2024  10:46 CDT

## 2024-04-04 NOTE — PLAN OF CARE
Goal Outcome Evaluation:  Plan of Care Reviewed With: patient        Progress: no change  Outcome Evaluation: OT tx completed. Pt presents with chair alarm sounding, reporting desire to go home. Pt restless this date. Pt completed fxl mobility using FWW with CGA. Pt reported fatigue and requested seated RB at BS following fxl mobility. Pt completed sit<>stand t/f at BS with CGA. Pt completed grooming standing sink-side with CGA required and cues for initiation and tech. Pt with continued confusion, unable to recall need for CAM boot to L LE when up despite recent education and cues. Pt left sitting in chair with sitter present in room and chair alarm in place. Continue OT POC in order to increase pt safety and I during ADLs, fxl mobility, and fxl t/f's.

## 2024-04-04 NOTE — PAYOR COMM NOTE
"4/4/24 [facility] [phone]  [phone]      FAXING DAILY CLINICAL 4/4/24            Erick Luong (68 y.o. Male)       Date of Birth   1955    Social Security Number       Address   [address] [place] [address]    Home Phone   [phone]    MRN   [id number]       [place]an   [facility]    Marital Status                               Admission Date   3/26/24    Admission Type   Emergency    Admitting Provider   Latanya Paez MD    Attending Provider   Latanya Paez MD    Department, Room/Bed   [facility] 3C, 372/1       Discharge Date       Discharge Disposition       Discharge Destination                                 Attending Provider: Latanya Paez MD    Allergies: Cefepime, Bactrim [Sulfamethoxazole-trimethoprim], Vancomycin, Zolpidem, Zolpidem Tartrate, Metronidazole    Isolation: Contact   Infection: MRSA (05/19/19), COVID (History) (08/08/22)   Code Status: CPR    Ht: 182.9 cm (72\")   Wt: 133 kg (293 lb 12.8 oz)    Admission Cmt: None   Principal Problem: Sepsis [A41.9]                   Active Insurance as of 3/26/2024       Primary Coverage       Payor Plan Insurance Group Employer/Plan Group    [employer] [facility] EMPLOYEE [id number]       Payor Plan Address Payor Plan Phone Number Payor Plan Fax Number Effective Dates    [address] [phone]  1/1/2022 - None Entered    [facility] [address]         Subscriber Name Subscriber Birth Date Member ID       ZAINAB LUONG 11/27/1969 [id number]               Secondary Coverage       Payor Plan Insurance Group Employer/Plan Group    MEDICARE MEDICARE A & B        Payor Plan Address Payor Plan Phone Number Payor Plan Fax Number Effective Dates    [address] [phone]  7/1/2013 - None Entered    [facility] [address]         Subscriber Name Subscriber Birth Date Member ID       ERICK LUONG 1955 [id number]                     Emergency Contacts       Contact " Person (Rel.) Home Phone Work Phone Mobile Phone    Joan Luong (Spouse) [phone]             Patient Had a Fall This Encounter  View Post Fall Documentation and Orders     [facility] Encounter Date/Time: 3/26/2024 [facility]   Hospital Account: [id number]    MRN: [id number]   Patient:  Erick Luong   Contact Serial #: [id number]   SSN:          ENCOUNTER             Patient Class: Inpatient   Unit: [address] Service: Medicine     Bed: 372/1   Admitting Provider: Latanya Paez MD   Referring Physician:     Attending Provider: Latanya Paez MD   Adm Diagnosis: Diabetic foot infection *               PATIENT             Name: Erick Luong : 1955 (68 yrs)   Address: [address] [place] [address] Sex: Male   City: [place] [address]   County: [facility]   Marital Status:  Ethnicity: NOT                                                                         Race: WHITE   Primary Care Provider: Del Shetty MD Patients Phone: Home Phone: [phone]     Mobile Phone: [phone]     EMERGENCY CONTACT   Contact Name Legal Guardian? Relationship to Patient Home Phone Work Phone Mobile Phone   1. Cherise,Joan  2. *No Contact Specified* No    Spouse    [phone]      GUARANTOR             Guarantor: Erick Luong     : 1955   Address: [address] [place] [address] Sex: [place] [address]     Relation to Patient: Self       Home Phone: [phone]   Guarantor ID: [id number]       Work Phone:     GUARANTOR EMPLOYER   Employer:           Status: DISABLED   COVERAGE          PRIMARY INSURANCE   Payor: [employer] Plan: [employer]   Group Number: [id number] Insurance Type: INDEMNITY   Subscriber Name: JOAN LUONG Subscriber : 1969   Subscriber ID: [id number] Coverage Address: [facility] [address]  [place] [address]   Pat. Rel. to Subscriber: Spouse Coverage Phone: [phone]  "  SECONDARY INSURANCE   Payor: MEDICARE Plan: MEDICARE A & B   Group Number:   Insurance Type: INDEMNITY   Subscriber Name: ERICK LUONG Subscriber : 1955   Subscriber ID: [id number] Coverage Address: [facility] [address]  [place] [address]   Pat. Rel. to Subscriber: SELF Coverage Phone: [phone]      Contact Serial # ([id number])         2024    Chart ID ([id number])            Julia Adrian MD   Physician  Infectious Disease     Progress Notes     Incomplete     Date of Service: 24  Creation Time: 24     Incomplete       Expand All Collapse All    INFECTIOUS DISEASES PROGRESS NOTE     Patient:  Erick Luong  YOB: 1955  MRN: [id number]       Admit date: 3/26/2024             Admitting Physician: Latanya Paez MD  Primary Care Physician: Del Shetty MD     Chief Complaint: Abdominal pain        Interval History: Patient still says he is having some abdominal pain.  He denied vomiting or diarrhea        Allergies:   Allergies         Allergies   Allergen Reactions    Cefepime Hives and Anaphylaxis    Bactrim [Sulfamethoxazole-Trimethoprim] Other (See Comments)       \"RENAL FAILURE\"    Vancomycin Itching    Zolpidem Unknown - High Severity       \"makes him crazy\"    Zolpidem Tartrate Unknown - Low Severity and Provider Review Needed    Metronidazole Rash            Current Scheduled Medications:     Scheduled Medication   apixaban, 5 mg, Oral, Q12H  ascorbic acid, 1,000 mg, Oral, Daily  aspirin, 81 mg, Oral, Daily  bumetanide, 1 mg, Oral, Daily  calcitriol, 0.5 mcg, Oral, Daily  carvedilol, 3.125 mg, Oral, BID With Meals  donepezil, 10 mg, Oral, Daily  famotidine, 20 mg, Oral, Daily  insulin detemir, 30 Units, Subcutaneous, Nightly  insulin lispro, 4-24 Units, Subcutaneous, 4x Daily AC & at Bedtime  insulin regular, 10 Units, Subcutaneous, TID AC  isosorbide mononitrate, 30 mg, Oral, Q24H  lactulose, 20 g, Oral, TID  Linezolid, " "600 mg, Intravenous, Q12H  melatonin, 6 mg, Oral, Nightly  midodrine, 2.5 mg, Oral, TID AC  mupirocin, 1 Application, Each Nare, BID  pregabalin, 100 mg, Oral, Nightly  pregabalin, 50 mg, Oral, Daily  rosuvastatin, 10 mg, Oral, Nightly  senna-docusate sodium, 1 tablet, Oral, BID  sodium chloride, 10 mL, Intravenous, Q12H  sucralfate, 1 g, Oral, TID AC  tamsulosin, 0.4 mg, Oral, Daily  timolol, 1 drop, Both Eyes, Q12H  vitamin D3, 5,000 Units, Oral, Daily  zinc sulfate, 220 mg, Oral, Daily         Current PRN Medications:    PRN Medication     acetaminophen **OR** acetaminophen **OR** acetaminophen    senna-docusate sodium **AND** polyethylene glycol **AND** bisacodyl **AND** bisacodyl    dextrose    dextrose    Diclofenac Sodium    dicyclomine    glucagon (human recombinant)    haloperidol lactate    magnesium hydroxide    Morphine    nitroglycerin    ondansetron ODT **OR** ondansetron    oxyCODONE    sodium chloride    sodium chloride    sodium chloride        Infusion Medications                   Objective[]Expand by Default  Vital Signs:  Temp (24hrs), Av.7 °F (36.5 °C), Min:97.4 °F (36.3 °C), Max:98.2 °F (36.8 °C)        /63 (BP Location: Right arm, Patient Position: Lying)   Pulse 68   Temp 97.5 °F (36.4 °C) (Oral)   Resp 16   Ht 182.9 cm (72\")   Wt 133 kg (293 lb 12.8 oz)   SpO2 97%   BMI 39.85 kg/m²            Physical Exam:  General: Patient sitting up in the recliner no acute distress  Respiratory: Effort even and unlabored  Abdomen: Soft, no rebound or guarding.  Does complain of tenderness with palpation.  Left foot dressing clean dry and intact     Left foot photos below                                esults Review:    I reviewed the patient's new clinical results.     Lab Results:     CBC:             Lab Results   Lab 24  0551 24  0620 24  0526 24  0402 24  0617 24  0334 24  0514   WBC 6.08 5.69 5.01 5.15 7.59 6.54 5.64   HEMOGLOBIN 11.1* " 10.0* 10.3* 9.9* 10.8* 9.0* 10.0*   HEMATOCRIT 33.6* 30.6* 31.6* 30.6* 34.0* 28.9* 31.3*   PLATELETS 156 155 149 162 197 197 251         AutoDiff:         Lab Results   Lab 04/02/24  0617 04/03/24  0334 04/04/24  0514   NEUTROPHIL % 61.4 55.7 56.7   LYMPHOCYTE % 24.1 29.5 27.8   MONOCYTES % 10.0 9.3 10.1   EOSINOPHIL % 3.7 4.4 4.4   BASOPHIL % 0.4 0.5 0.5   NEUTROS ABS 4.66 3.64 3.19   LYMPHS ABS 1.83 1.93 1.57   MONOS ABS 0.76 0.61 0.57   EOS ABS 0.28 0.29 0.25   BASOS ABS 0.03 0.03 0.03         Manual Diff:          Lab Results   Lab 04/02/24  0617 04/03/24  0334 04/04/24  0514   NEUTROS ABS 4.66 3.64 3.19               CMP:             Lab Results   Lab 03/29/24  0551 03/30/24  0620 03/31/24  0526 04/01/24  0402 04/02/24  0656 04/03/24  0334 04/04/24  0514   SODIUM 138 135* 136   < > 139 139 139   POTASSIUM 3.3* 3.5 3.6   < > 4.1 4.2 4.6   CHLORIDE 103 100 101   < > 104 104 102   CO2 23.0 24.0 23.0   < > 26.0 25.0 26.0   BUN 40* 51* 51*   < > 43* 37* 32*   CREATININE 2.07* 2.74* 2.52*   < > 2.02* 1.76* 1.78*   CALCIUM 8.4* 8.2* 8.6   < > 9.8 9.3 10.5   BILIRUBIN 0.7 0.5 0.5  --   --   --   --    ALK PHOS 81 95 110  --   --   --   --    ALT (SGPT) 17 19 19  --   --   --   --    AST (SGOT) 32 32 30  --   --   --   --    GLUCOSE 120* 104* 201*   < > 106* 118* 185*    < > = values in this interval not displayed.         Estimated Creatinine Clearance: 56.8 mL/min (A) (by C-G formula based on SCr of 1.78 mg/dL (H)).     Culture Results:     Microbiology Results (last 10 days)         Procedure Component Value - Date/Time     Blood Culture With DARSHAN - Blood, Hand, Right [[id number]]  (Normal) Collected: 04/01/24 0402     Lab Status: Preliminary result Specimen: Blood from Hand, Right Updated: 04/04/24 0445       Blood Culture No growth at 3 days     Blood Culture With DARSHAN - Blood, Arm, Left [[id number]]  (Normal) Collected: 03/30/24 0620     Lab Status: Final result Specimen: Blood from Arm, Left Updated: 04/04/24 0645        Blood Culture No growth at 5 days     Blood Culture With DARSHAN - Blood, Hand, Right [[id number]]  (Abnormal)  (Susceptibility) Collected: 03/29/24 0843     Lab Status: Final result Specimen: Blood from Hand, Right Updated: 04/02/24 0521       Blood Culture Staphylococcus aureus, MRSA       Comment:    Methicillin resistant Staphylococcus aureus, Patient may be an isolation risk.  Infectious disease consultation is highly recommended to rule out distant foci of infection.          Isolated from Aerobic Bottle       Gram Stain Aerobic Bottle Gram positive cocci     Susceptibility                   Staphylococcus aureus, MRSA         MATT         Gentamicin Susceptible         Oxacillin Resistant         Rifampin Susceptible         Vancomycin Susceptible                                Susceptibility Comments         Staphylococcus aureus, MRSA     This isolate is presumed to be clindamycin resistant based on detection of inducible clindamycin resistance.  Clindamycin may still be effective in some patients.                  Blood Culture ID, PCR - Blood, Hand, Right [[id number]]  (Abnormal) Collected: 03/29/24 0843     Lab Status: Final result Specimen: Blood from Hand, Right Updated: 03/30/24 2028       BCID, PCR Staph aureus. mecA/C and MREJ (methicillin resistance gene) detected. Identification by BCID2 PCR.       BOTTLE TYPE Aerobic Bottle     Narrative:       Infectious disease consultation is highly recommended to rule out distant foci of infection.     MRSA Screen, PCR (Inpatient) - Swab, Nares [[id number]]  (Abnormal) Collected: 03/28/24 2128     Lab Status: Final result Specimen: Swab from Nares Updated: 03/28/24 2239       MRSA PCR MRSA Detected     Narrative:       The negative predictive value of this diagnostic test is high and should only be used to consider de-escalating anti-MRSA therapy. A positive result may indicate colonization with MRSA and must be correlated clinically.     AMAN AURIS SCREEN -  Swab, Axilla Right, Axilla Left and Groin [[id number]]  (Normal) Collected: 03/27/24 0351     Lab Status: Final result Specimen: Swab from Axilla Right, Axilla Left and Groin Updated: 04/01/24 0415       Candida Auris Screen Culture No Candida auris isolated at 5 days     Respiratory Panel PCR w/COVID-19(SARS-CoV-2) MICHAEL/ANKUSH/SEAN/PAD/COR/ROSE MARY In-House, NP Swab in UTM/VTM, 2 HR TAT - Swab, Nasopharynx [[id number]]  (Normal) Collected: 03/26/24 1732     Lab Status: Final result Specimen: Swab from Nasopharynx Updated: 03/26/24 1905       ADENOVIRUS, PCR Not Detected       Coronavirus 229E Not Detected       Coronavirus HKU1 Not Detected       Coronavirus NL63 Not Detected       Coronavirus OC43 Not Detected       COVID19 Not Detected       Human Metapneumovirus Not Detected       Human Rhinovirus/Enterovirus Not Detected       Influenza A PCR Not Detected       Influenza B PCR Not Detected       Parainfluenza Virus 1 Not Detected       Parainfluenza Virus 2 Not Detected       Parainfluenza Virus 3 Not Detected       Parainfluenza Virus 4 Not Detected       RSV, PCR Not Detected       Bordetella pertussis pcr Not Detected       Bordetella parapertussis PCR Not Detected       Chlamydophila pneumoniae PCR Not Detected       Mycoplasma pneumo by PCR Not Detected     Narrative:       In the setting of a positive respiratory panel with a viral infection PLUS a negative procalcitonin without other underlying concern for bacterial infection, consider observing off antibiotics or discontinuation of antibiotics and continue supportive care. If the respiratory panel is positive for atypical bacterial infection (Bordetella pertussis, Chlamydophila pneumoniae, or Mycoplasma pneumoniae), consider antibiotic de-escalation to target atypical bacterial infection.     Blood Culture - Blood, Arm, Right [[id number]]  (Abnormal) Collected: 03/26/24 1346     Lab Status: Final result Specimen: Blood from Arm, Right Updated: 03/29/24 0542        Blood Culture Staphylococcus aureus, MRSA       Comment:    Infectious disease consultation is highly recommended to rule out distant foci of infection.  Methicillin resistant Staphylococcus aureus, Patient may be an isolation risk.          Isolated from Aerobic and Anaerobic Bottles       Gram Stain Aerobic Bottle Gram positive cocci         Anaerobic Bottle Gram positive cocci     Blood Culture - Blood, Arm, Left [[id number]]  (Abnormal)  (Susceptibility) Collected: 03/26/24 1346     Lab Status: Final result Specimen: Blood from Arm, Left Updated: 03/29/24 0513       Blood Culture Staphylococcus aureus, MRSA       Comment:    Infectious disease consultation is highly recommended to rule out distant foci of infection.  Methicillin resistant Staphylococcus aureus, Patient may be an isolation risk.          Isolated from Aerobic and Anaerobic Bottles       Gram Stain Aerobic Bottle Gram positive cocci         Anaerobic Bottle Gram positive cocci     Susceptibility                   Staphylococcus aureus, MRSA         MATT         Gentamicin Susceptible         Oxacillin Resistant         Rifampin Susceptible         Vancomycin Susceptible                                Susceptibility Comments         Staphylococcus aureus, MRSA     This isolate is presumed to be clindamycin resistant based on detection of inducible clindamycin resistance.  Clindamycin may still be effective in some patients.                  Blood Culture ID, PCR - Blood, Arm, Left [[id number]]  (Abnormal) Collected: 03/26/24 1346     Lab Status: Final result Specimen: Blood from Arm, Left Updated: 03/27/24 0426       BCID, PCR Staph aureus. mecA/C and MREJ (methicillin resistance gene) detected. Identification by BCID2 PCR.       BOTTLE TYPE Aerobic Bottle     Narrative:       Infectious disease consultation is highly recommended to rule out distant foci of infection.                Interpretation Summary          The study is technically difficult  for diagnosis.  If there is concern for possible infective endocarditis, recommend transesophageal echocardiogram to better visualize the valves.    Left ventricular systolic function is normal. Left ventricular ejection fraction appears to be 61 - 65%.    Left ventricular wall thickness is consistent with mild concentric hypertrophy    Left ventricular diastolic function is consistent with (grade III w/high LAP) fixed restrictive pattern.    Mildly reduced right ventricular systolic function noted.    The left atrial cavity is mildly dilated.    There is mild calcification of the aortic valve. No aortic valve regurgitation is present. No hemodynamically significant aortic valve stenosis is present.        Signed     Electronically signed by Omkar Charles DO on 4/2/24 at 1412 CDT         Radiology:                 Imaging Results (Last 72 Hours)         Procedure Component Value Units Date/Time     XR Abdomen KUB [[id number]] Collected: 04/03/24 1345       Updated: 04/03/24 1349     Narrative:       EXAMINATION: XR ABDOMEN KUB- 4/3/2024 1:45 PM     HISTORY: abdominal distention; E11.628-Type 2 diabetes mellitus with  other skin complications; L08.9-Local infection of the skin and  subcutaneous tissue, unspecified; E11.65-Type 2 diabetes mellitus with  hyperglycemia; Z74.09-Other reduced mobility.     REPORT: Supine imaging of the abdomen was performed.     COMPARISON: KUB 10/7/2023.     A large amount of stool seen within the transverse colon and flexures,  likely representing constipation. No evidence of bowel obstruction is  identified. There are no definite urinary tract calculi. Cholecystectomy  clips are present. No acute osseous abnormality is identified.        Impression:       Extensive constipation within the transverse colon and  flexures.     This report was signed and finalized on 4/3/2024 1:46 PM by Dr. Percy Cesar MD.                              Active Hospital Problems     Diagnosis  "     **Sepsis      Diabetic foot infection      Chronic kidney disease (CKD), stage IV (severe)      Chronic diastolic heart failure      BPH without obstruction/lower urinary tract symptoms      Diabetic polyneuropathy associated with type 2 diabetes mellitus      Anemia due to chronic kidney disease      Essential hypertension      Type 2 diabetes mellitus with hyperglycemia, with long-term current use of insulin           IMPRESSION:  MRSA bacteremia-blood cultures + March 26 and March 29.  Secondary to infected left foot with wounds and associated cellulitis on admission.  Blood culture March 30 are negative at 4 days and blood cultures from April 1 negative at 2 days  Chronic kidney disease stage IIIb-with acute kidney injury-improved and bumex restarted 4/3  Type 2 diabetes mellitus-poorly controlled with hemoglobin A1c of 10.7 this admission-  History of GI bleed-reviewed this with patient's wife.  Patient on Carafate and famotidine.  Per patient's wife he was on Protonix at 1 point but was switched to famotidine at recent rehab facility.  Hemoglobin remained stable.  Abdominal discomfort.  Patient denies nausea or vomiting or diarrhea.  He is eating well.  May be related to linezolid is nonspecific abdominal pain has been reported with linezolid.  MRSA nasal screen positive        RECOMMENDATION:   Continue linezolid-although unclear if this is related to his complaints of abdominal pain.  Would start with plain x-ray before moving to CT abdomen/pelvis.  Continue think about alternatives given \"allergy\" to vancomycin and chronic kidney disease while keeping with standard of care for MRSA bacteremia.  Given lack of persistent bacteremia, do not feel like we need to proceed with YANIV at this time.  Will need to monitor CBC closely given linezolid's risk of bone marrow suppression.  Wound care per Dr. Cerrato's orders  Continue to monitor surveillance blood cultures from  March 30 and April 1 until " complete.  Glucose management per attending     Currently plan for at least 2 weeks of linezolid with start date after clearance of blood cultures-currently March 30.  Should be able to transition to oral linezolid given increased bioavailability.            Julia Adrian MD  04/04/24  08:22 CDT                          Encounter Date    3/26/24    XR Abdomen KUB [[id number]] (Order [id number])  Order  Status: Final result     Patient Location    Patient Class Location   Inpatient [facility] 3C, 372, 1     [phone]     Appointment Information    PACS Images     Radiology Images  Study Result    Narrative & Impression   EXAMINATION: XR ABDOMEN KUB- 4/3/2024 1:45 PM     HISTORY: abdominal distention; E11.628-Type 2 diabetes mellitus with  other skin complications; L08.9-Local infection of the skin and  subcutaneous tissue, unspecified; E11.65-Type 2 diabetes mellitus with  hyperglycemia; Z74.09-Other reduced mobility.     REPORT: Supine imaging of the abdomen was performed.     COMPARISON: KUB 10/7/2023.     A large amount of stool seen within the transverse colon and flexures,  likely representing constipation. No evidence of bowel obstruction is  identified. There are no definite urinary tract calculi. Cholecystectomy  clips are present. No acute osseous abnormality is identified.     IMPRESSION:  Extensive constipation within the transverse colon and  flexures.     This report was signed and finalized on 4/3/2024 1:46 PM by Dr. Percy Cesar MD.        Imaging    XR Abdomen KUB (Order: [id number]) - 4/3/2024      04/04/24 0845 97.6 (36.4) 62 16 131/59 Sitting room air 100   04/04/24 0825 -- -- -- -- -- room air --   04/04/24 0449 97.5 (36.4) 68 16 125/63 Lying room air 97   04/03/24 2353 97.7 (36.5) 100 16 147/67 Sitting room air 97                   Current Facility-Administered Medications   Medication Dose Route Frequency Provider Last Rate Last Admin    acetaminophen (TYLENOL) tablet 650 mg  650 mg Oral  Q4H PRN Raquel Duncan APRN   650 mg at 04/04/24 0002    Or    acetaminophen (TYLENOL) 160 MG/5ML oral solution 650 mg  650 mg Oral Q4H PRN Raquel Duncan APRN   650 mg at 03/27/24 2109    Or    acetaminophen (TYLENOL) suppository 650 mg  650 mg Rectal Q4H PRN Raquel Duncan APRN        apixaban (ELIQUIS) tablet 5 mg  5 mg Oral Q12H Raquel Duncan APRN   5 mg at 04/04/24 0849    ascorbic acid (VITAMIN C) tablet 1,000 mg  1,000 mg Oral Daily Rene Maloney MD   1,000 mg at 04/04/24 0849    aspirin EC tablet 81 mg  81 mg Oral Daily Rene Maloney MD   81 mg at 04/04/24 0849    sennosides-docusate (PERICOLACE) 8.6-50 MG per tablet 1 tablet  1 tablet Oral BID Julia Adrian MD   1 tablet at 04/04/24 0847    And    polyethylene glycol (MIRALAX) packet 17 g  17 g Oral Daily Julia Adrian MD        And    bisacodyl (DULCOLAX) EC tablet 5 mg  5 mg Oral Daily PRN Julia Adrian MD        And    bisacodyl (DULCOLAX) suppository 10 mg  10 mg Rectal Daily PRN Julia Adrian MD        bumetanide (BUMEX) tablet 1 mg  1 mg Oral Daily Stewart Alvarez APRN   1 mg at 04/04/24 0847    calcitriol (ROCALTROL) capsule 0.5 mcg  0.5 mcg Oral Daily Rene Maloney MD   0.5 mcg at 04/04/24 0848    carvedilol (COREG) tablet 3.125 mg  3.125 mg Oral BID With Meals Rene Maloney MD   3.125 mg at 04/03/24 1709    dextrose (D50W) (25 g/50 mL) IV injection 25 g  25 g Intravenous Q15 Min PRN Raquel Duncan APRN        dextrose (GLUTOSE) oral gel 15 g  15 g Oral Q15 Min PRN Raquel Duncan APRN   15 g at 03/27/24 1710    Diclofenac Sodium (VOLTAREN) 1 % gel 2 g  2 g Topical 4x Daily PRN Rene Maloney MD        dicyclomine (BENTYL) capsule 20 mg  20 mg Oral TID PRN Rene Maloney MD   20 mg at 04/02/24 2234    donepezil (ARICEPT) tablet 10 mg  10 mg Oral Daily Raquel Duncan APRN   10 mg at 04/04/24 0849    famotidine (PEPCID) tablet 20 mg  20 mg Oral Daily  Rene Maloney MD   20 mg at 04/04/24 0847    glucagon (GLUCAGEN) injection 1 mg  1 mg Intramuscular Q15 Min PRN Raquel Duncan, APRSHERRILL        haloperidol lactate (HALDOL) injection 5 mg  5 mg Intravenous Q6H PRN Rene Maloney MD   5 mg at 04/03/24 0026    insulin detemir (LEVEMIR) injection 30 Units  30 Units Subcutaneous Nightly Raquel Duncan APRN   30 Units at 04/02/24 2049    Insulin Lispro (humaLOG) injection 4-24 Units  4-24 Units Subcutaneous 4x Daily AC & at Bedtime Raquel Duncan APRSHERRILL   8 Units at 04/04/24 0847    insulin regular (humuLIN R,novoLIN R) injection 10 Units  10 Units Subcutaneous TID AC Steve De Jesus MD   10 Units at 04/04/24 0847    isosorbide mononitrate (IMDUR) 24 hr tablet 30 mg  30 mg Oral Q24H Rene Maloney MD   30 mg at 04/04/24 0848    lactulose solution 20 g  20 g Oral TID Latanya Paez MD   20 g at 04/04/24 0853    Linezolid (ZYVOX) 600 mg 300 mL  600 mg Intravenous Q12H Julia Adrian  mL/hr at 04/04/24 0002 600 mg at 04/04/24 0002    magnesium hydroxide (MILK OF MAGNESIA) suspension 10 mL  10 mL Oral Daily PRN Rene Maloney MD   10 mL at 04/03/24 0414    melatonin tablet 6 mg  6 mg Oral Nightly Rene Maloney MD   6 mg at 04/03/24 2053    midodrine (PROAMATINE) tablet 2.5 mg  2.5 mg Oral TID AC Rene Maloney MD   2.5 mg at 04/04/24 0848    Morphine sulfate (PF) injection 2 mg  2 mg Intravenous Q3H PRN Rene Maloney MD   2 mg at 04/04/24 0601    mupirocin (BACTROBAN) 2 % nasal ointment 1 Application  1 Application Each Nare BID Nadia Polo, SUSAN   1 Application at 04/04/24 0849    nitroglycerin (NITROSTAT) SL tablet 0.4 mg  0.4 mg Sublingual Q5 Min PRN Raquel Duncan APRN        ondansetron ODT (ZOFRAN-ODT) disintegrating tablet 4 mg  4 mg Oral Q6H PRN Raquel Duncan APRN   4 mg at 04/03/24 0537    Or    ondansetron (ZOFRAN) injection 4 mg  4 mg Intravenous Q6H PRN Raquel Duncan APRN   4 mg  at 03/29/24 1837    oxyCODONE (ROXICODONE) immediate release tablet 10 mg  10 mg Oral BID PRN Raquel Duncan APRN   10 mg at 04/03/24 2053    pregabalin (LYRICA) capsule 100 mg  100 mg Oral Nightly Rene Maloney MD   100 mg at 04/03/24 2053    pregabalin (LYRICA) capsule 50 mg  50 mg Oral Daily Rene Maloney MD   50 mg at 04/04/24 0849    rosuvastatin (CRESTOR) tablet 10 mg  10 mg Oral Nightly Rene Maloney MD   10 mg at 04/03/24 1952    sodium chloride 0.9 % flush 10 mL  10 mL Intravenous PRN Steve De Jesus MD        sodium chloride 0.9 % flush 10 mL  10 mL Intravenous Q12H Raquel Duncan APRN   10 mL at 04/03/24 0803    sodium chloride 0.9 % flush 10 mL  10 mL Intravenous PRN Raquel Duncan APRN        sodium chloride 0.9 % infusion 40 mL  40 mL Intravenous PRN Raquel Duncan APRN        sucralfate (CARAFATE) 1 GM/10ML suspension 1 g  1 g Oral TID AC Rene Maloney MD   1 g at 04/04/24 0852    tamsulosin (FLOMAX) 24 hr capsule 0.4 mg  0.4 mg Oral Daily Rene Maloney MD   0.4 mg at 04/04/24 0849    timolol (TIMOPTIC) 0.25 % ophthalmic solution 1 drop  1 drop Both Eyes Q12H Rene Maloney MD   1 drop at 04/03/24 2056    vitamin D3 capsule 5,000 Units  5,000 Units Oral Daily Rene Maloney MD   5,000 Units at 04/04/24 0849    zinc sulfate (ZINCATE) capsule 220 mg  220 mg Oral Daily Rene Maloney MD   220 mg at 04/04/24 0849

## 2024-04-04 NOTE — PROGRESS NOTES
[facility] Medicine Services  INPATIENT PROGRESS NOTE    Patient Name: Erick Luong  Date of Admission: 3/26/2024  Today's Date: 04/04/24  Length of Stay: 9  Primary Care Physician: Del Shetty MD    Subjective   Chief Complaint: Altered mental status  Erick Luong is a 68-year-old male with a past medical history of coronary artery disease, diastolic dysfunction followed by Dr. Garay, vascular disease, GERD, diabetes, chronic anemia, chronic nonhealing wound to the left foot followed by [facility] wound care.  Patient had an extended hospitalization 8/2023 due to syncope, subsequent admission to [facility] 9/18 - 10/11, 2023.  During that admission he did undergo debridement of Proteus wound infection.     Patient seen by PCP on 3/11/2024, started on prazosin 1 mg daily, duloxetine 60 mg daily magnesium hydroxide 400 mg daily per office note.  Wife is unaware of exactly what patient is taking.  Will need pharmacy to obtain correct med list.     [facility] performed debridement on the plantar wound treating with Santyl, is also a area on dorsal aspect of the foot.  Foot is erythemic, warm to touch.  Doctor told patient and wife he was doing well.  There was no signs of infection yesterday.  Today patient was brought to emergency department via EMS for altered mental status with disorientation, glucose was noted to be over 500 in the ambulance.  Initial glucose here 400 followed with 438 treated with regular insulin.  I have taken care of this patient many times.  He awakens for questions.  He is somewhat somnolent.  Remainder workup reveals creatinine 1.9 at baseline, CRP 4.3, lactic acid 3.2 and procalcitonin 6.56.  He does have a white count of 14.6, no bandemia.  Initially afebrile however now 100.7, patient meets criteria for sepsis with tachycardia and tachypnea, elevated lactic acid and source of infection.   There is no organ failure.  Patient is admitted for simple sepsis, condition stabl  3/31   Patient is alert and oriented x 3.  He appears to be in good spirits.  He is requesting for discharge.  He was counseled that we will need to wait until later in this week, after we set up IV antibiotics before we can discharge him.    4/1  Patient was admitted to ER on 3/26 with altered mental status. Had debridement of ongoing wound on left foot the day prior. Left foot warm and erythremic on arrival to ER. Noted to have elevated blood glucose over 400. Initial creatinine of 1.9.  [facility] neph[facility] were consulted for acute on chronic renal failure MRI of the left foot did not show any osteomyelitis or abscess.  [facility] ID patient was found to have MRSA bacteremia-blood cultures + March 26 and March 29.  Secondary to infected left foot with wounds and associated cellulitis on admission patient was started on Zyvox plan is for echo today rule out endocarditis given persistent bacteremia holding off on PICC line pending that   4/2  As before history of coronary disease diabetes chronic diabetic foot ulcer noncompliance with A1c 10.4 presented to us with mental status change was found to have MRSA sepsis bacteremia secondary to his infected foot MRI did not show any osteo or abscess has been on Zyvox echo was ordered to rule out endocarditis for persistent bacteremia, podiatry recommends wound care and follow-up with outpatient wound care appreciate ID Currently plan for at least 2 weeks of linezolid with start date after clearance of blood cultures-currently March 30. Should be able to transition to oral linezolid given increased bioavailability. Duration may change based on echocardiogram findings , [facility] neph[facility] acute on chronic renal failure secondary to ATN resolving  4/3  Remains on Zyvox blood cultures remains negative echo of the heart could not rule out endocarditis poor quality recommending YANIV if  endocarditis is a concern we will leave this up to the infectious disease attending appreciate nephrology renal function is getting better, per podiatry continue wound care continue antibiotics MRI is showing no evidence of soft tissue abscess or osteomyelitis podiatry will see as outpatient, patient A1c is 10.8 needs much better control of his diabetes and much better follow-up with his family doctor regarding his diabetes, and is having stomach distention and bloating last bowel movement was 3 days ago will do KUB and start him on lactulose  4/4  As above ATN is resolving creatinine down to 1.78, reassuring extensive constipation to account for his abdominal pain was started on lactulose and will go ahead and start him on enemas, appreciate ID continue Zyvox I agree with ID given the lack of persistent bacteremia no need for YANIV continue wound care repeat blood culture remains unremarkable from March 30, awaiting SNF, patient said that he did not have any more bowel movements we will go ahead and start him on enemas awaiting SNF  Review of Systems   All pertinent negatives and positives are as above. All other systems have been reviewed and are negative unless otherwise stated.     Objective    Temp:  [97.4 °F (36.3 °C)-98.2 °F (36.8 °C)] 97.6 °F (36.4 °C)  Heart Rate:  [] 62  Resp:  [16-18] 16  BP: (125-157)/(59-71) 131/59  Physical Exam  Constitutional:       General: He is not in acute distress.     Appearance: He is well-developed. He is not diaphoretic.   HENT:      Head: Normocephalic.   Eyes:      General: No scleral icterus.     Conjunctiva/sclera: Conjunctivae normal.      Pupils: Pupils are equal, round, and reactive to light.   Neck:      Thyroid: No thyromegaly.      Vascular: No JVD.      Trachea: No tracheal deviation.   Cardiovascular:      Rate and Rhythm: Normal rate and regular rhythm.      Heart sounds: Normal heart sounds. No murmur heard.     No friction rub. No gallop.   Pulmonary:       Effort: Pulmonary effort is normal. No respiratory distress.      Breath sounds: Normal breath sounds. No stridor. No wheezing or rales.   Chest:      Chest wall: No tenderness.   Abdominal:      General: Bowel sounds are normal. There is no distension.      Palpations: Abdomen is soft. There is no mass.      Tenderness: There is no abdominal tenderness. There is no guarding or rebound.      Hernia: No hernia is present.   Musculoskeletal:         General: Swelling and signs of injury present. No tenderness or deformity. Normal range of motion.      Cervical back: Normal range of motion and neck supple.      Right lower leg: No edema.      Comments: Dressing over left foot noted.   Lymphadenopathy:      Cervical: No cervical adenopathy.   Skin:     General: Skin is warm and dry.      Coloration: Skin is not pale.      Findings: No erythema or rash.   Neurological:      General: No focal deficit present.      Mental Status: He is alert and oriented to person, place, and time.      Cranial Nerves: No cranial nerve deficit.      Sensory: No sensory deficit.      Motor: No abnormal muscle tone.      Coordination: Coordination normal.   Psychiatric:         Mood and Affect: Mood normal.         Behavior: Behavior normal.            File Link    Scan on 3/26/2024 1423 by Ro Vinson RN: Wound 06/15/23 0102 Left anterior plantar        Key Information    Document ID File Type Document Type Description   [id number] Image WOUND IMAGE Wound 06/15/23 0102 Left anterior plantar     Import Information    Attached At Date Time User Dept   Encounter Level 3/26/2024  2:23 PM Ro Vinson RN [facility] Emergency Dept     Encounter    Hospital Encounter on 3/26/24     Results Review:  I have reviewed the labs, radiology results, and diagnostic studies.    Laboratory Data:   Results from last 7 days   Lab Units 04/04/24  0514 04/03/24  0334 04/02/24  0617   WBC 10*3/mm3 5.64 6.54 7.59   HEMOGLOBIN g/dL 10.0* 9.0* 10.8*    HEMATOCRIT % 31.3* 28.9* 34.0*   PLATELETS 10*3/mm3 251 197 197        Results from last 7 days   Lab Units 04/04/24  0514 04/03/24  0334 04/02/24  0656 04/01/24  0402 03/31/24  0526 03/30/24  0620 03/29/24  0551   SODIUM mmol/L 139 139 139   < > 136 135* 138   POTASSIUM mmol/L 4.6 4.2 4.1   < > 3.6 3.5 3.3*   CHLORIDE mmol/L 102 104 104   < > 101 100 103   CO2 mmol/L 26.0 25.0 26.0   < > 23.0 24.0 23.0   BUN mg/dL 32* 37* 43*   < > 51* 51* 40*   CREATININE mg/dL 1.78* 1.76* 2.02*   < > 2.52* 2.74* 2.07*   CALCIUM mg/dL 10.5 9.3 9.8   < > 8.6 8.2* 8.4*   BILIRUBIN mg/dL  --   --   --   --  0.5 0.5 0.7   ALK PHOS U/L  --   --   --   --  110 95 81   ALT (SGPT) U/L  --   --   --   --  19 19 17   AST (SGOT) U/L  --   --   --   --  30 32 32   GLUCOSE mg/dL 185* 118* 106*   < > 201* 104* 120*    < > = values in this interval not displayed.       Culture Data:   Blood Culture   Date Value Ref Range Status   03/26/2024 Abnormal Stain (C)  Preliminary   03/26/2024 Abnormal Stain (C)  Preliminary       Radiology Data:   Imaging Results (Last 24 Hours)       Procedure Component Value Units Date/Time    XR Abdomen KUB [[id number]] Collected: 04/03/24 1345     Updated: 04/03/24 1349    Narrative:      EXAMINATION: XR ABDOMEN KUB- 4/3/2024 1:45 PM     HISTORY: abdominal distention; E11.628-Type 2 diabetes mellitus with  other skin complications; L08.9-Local infection of the skin and  subcutaneous tissue, unspecified; E11.65-Type 2 diabetes mellitus with  hyperglycemia; Z74.09-Other reduced mobility.     REPORT: Supine imaging of the abdomen was performed.     COMPARISON: KUB 10/7/2023.     A large amount of stool seen within the transverse colon and flexures,  likely representing constipation. No evidence of bowel obstruction is  identified. There are no definite urinary tract calculi. Cholecystectomy  clips are present. No acute osseous abnormality is identified.       Impression:      Extensive constipation within the  transverse colon and  flexures.     This report was signed and finalized on 4/3/2024 1:46 PM by Dr. Percy Cesar MD.               I have reviewed the patient's current medications.     Assessment/Plan   Assessment  Active Hospital Problems    Diagnosis     **Sepsis     Diabetic foot infection     Chronic kidney disease (CKD), stage IV (severe)     Chronic diastolic heart failure     BPH without obstruction/lower urinary tract symptoms     Diabetic polyneuropathy associated with type 2 diabetes mellitus     Anemia due to chronic kidney disease     Essential hypertension     Type 2 diabetes mellitus with hyperglycemia, with long-term current use of insulin    MRSA bacteremia  Metabolic encephalopathy secondary to sepsis  WANDER on CKD    Treatment Plan  Continue IV antibiotics with Zyvox. Infectious disease input appreciated.  Mental status appears back to baseline now that sepsis is better controlled.  However he has persistently positive blood cultures.  Plan for 2D echo on Monday.  Will hold off on inserting PICC line for now until blood cultures are clear.    Podiatry consultation input appreciated.  Will follow recommendations.  Continue wound dressings as per podiatry Follow-up repeat blood cultures.    Nephrology and urology input appreciated.  Hold Bumex due to elevated creatinine and BUN.  No signs of urinary retention at this time.    Pain control with opiate pain medications.    Insulin sliding scale and Levemir for type 2 diabetes mellitus    DVT prophylaxis with Eliquis    PT/OT/wound care consultations    DVT prophylaxis with Eliquis    CODE STATUS is full code    Medical Decision Making  Number and Complexity of problems: 4 acute, severe, high complexity medical problems.  Multiple chronic medical problems.  Differential Diagnosis: None    Conditions and Status        Condition is worsening.     [facility] Data  External documents reviewed: None  Cardiac tracing (EKG, telemetry) interpretation:  None  Radiology interpretation: None  Labs reviewed: CBC, BMP, blood cultures reviewed by me  Any tests that were considered but not ordered: None     Decision rules/scores evaluated (example [id number], Wells, etc): N/A     Discussed with: Patient     Care Planning  Shared decision making: Patient and his wife  Code status and discussions: CODE STATUS is full code    Disposition  Social Determinants of Health that impact treatment or disposition: None  I expect the patient to be discharged to home in 3 to 5 days.     Electronically signed by Latanya Paez MD, 04/04/24, 09:55 CDT.

## 2024-04-04 NOTE — PLAN OF CARE
Goal Outcome Evaluation:  Plan of Care Reviewed With: patient        Progress: no change  Outcome Evaluation: pt restless, wanting to go home, dong L CAM boot, sit-stand cga-sba, pt amb 50 feet rwx cga, trans to chair cga, BLE AROM 10 x 2 reps, cues to stay on task

## 2024-04-04 NOTE — THERAPY TREATMENT NOTE
Acute Care - Physical Therapy Treatment Note  [facility]     Patient Name: Erick Luong  : 1955  MRN: [id number]  Today's Date: 2024      Visit Dx:     ICD-10-CM ICD-9-CM   1. Diabetic foot infection  E11.628 250.80    L08.9 686.9   2. Poorly controlled diabetes mellitus  E11.65 250.00   3. Impaired functional mobility and activity tolerance [Z74.09]  Z74.09 V49.89     Patient Active Problem List   Diagnosis    Obesity, unspecified obesity severity, unspecified obesity type    Essential hypertension    Type 2 diabetes mellitus with hyperglycemia, with long-term current use of insulin    Nonsmoker    Anemia due to chronic kidney disease    Class 3 severe obesity due to excess calories with body mass index (BMI) of 40.0 to 44.9 in adult    Anasarca    Sleep apnea with use of continuous positive airway pressure (CPAP)    Medically noncompliant    Diabetic ulcer of left midfoot associated with type 2 diabetes mellitus, with fat layer exposed    Diabetic polyneuropathy associated with type 2 diabetes mellitus    Spinal stenosis in cervical region    Degeneration of cervical intervertebral disc    Cervical radiculopathy    Degeneration of lumbar or lumbosacral intervertebral disc    Cervical myelopathy    Bilateral carpal tunnel syndrome    CAD (coronary artery disease)    GERD without esophagitis    BPH without obstruction/lower urinary tract symptoms    Stage 3b chronic kidney disease    Chronic diastolic heart failure    Type 2 myocardial infarction due to heart failure    Left carpal tunnel syndrome    Syncope and collapse, recurrent episodes    Poorly-controlled hypertension    Rhinovirus    Peripheral vascular disease    Chronic kidney disease (CKD), stage IV (severe)    Diabetic foot infection    Sepsis     Past Medical History:   Diagnosis Date    Arthritis     Autonomic disease     CAD (coronary artery disease) 2017    Cervical radiculopathy 2021    Chronic constipation with acute  exaccerbation 05/10/2021    Coronary artery disease     Degeneration of cervical intervertebral disc 08/11/2021    Diabetes mellitus     Diabetic foot ulcer 08/31/2020    Diabetic polyneuropathy associated with type 2 diabetes mellitus 01/18/2021    Elevated cholesterol     Gastroesophageal reflux disease 05/13/2019    Headache     HTN (hypertension), benign 05/03/2017    Hyperlipidemia     Hypertension     Mixed hyperlipidemia 02/07/2017    Multiple lung nodules 01/26/2020    5mm, 9 mm RLL identified 1/2020, not present 10/2019.    Myocardial infarction     Osteomyelitis 01/22/2020    Osteomyelitis of fifth toe of right foot 10/07/2019    Pancreatitis     Persistent insomnia 01/20/2020    Renal disorder     Sleep apnea 02/06/2017    Sleep apnea with use of continuous positive airway pressure (CPAP)     NON-COMPLIANT    Slow transit constipation 01/16/2019    Spinal stenosis in cervical region 09/16/2021    Vitamin D deficiency 03/02/2021     Past Surgical History:   Procedure Laterality Date    ABDOMINAL SURGERY      AMPUTATION FOOT / TOE Left 10/2021    5th digit     ANTERIOR CERVICAL DISCECTOMY W/ FUSION N/A 8/5/2022    Procedure: CERVICAL DISCECTOMY ANTERIOR WITH FUSION C5-6 with possible plating of C5-7 with neuromonitoring and 1 c-arm;  Surgeon: Karel Soliz MD;  Location:  [facility] OR;  Service: Neurosurgery;  Laterality: N/A;    APPENDECTOMY      BACK SURGERY      CARDIAC CATHETERIZATION Left 02/08/2021    Procedure: Left Heart Cath w poss intervention left anatomical snuff box acess;  Surgeon: Omkar Charles DO;  Location:  [facility] CATH INVASIVE LOCATION;  Service: Cardiology;  Laterality: Left;    CARDIAC SURGERY      CATARACT EXTRACTION      CERVICAL SPINE SURGERY      COLONOSCOPY N/A 01/31/2017    Normal exam repeat in 5 years    COLONOSCOPY N/A 02/11/2019    Mild acute inflammation    COLONOSCOPY W/ POLYPECTOMY  03/04/2014    Hyperplastic polyp    CORONARY ARTERY BYPASS GRAFT  10/2015     ENDOSCOPY  04/13/2011    Gastritis with hemorrhage    ENDOSCOPY N/A 05/05/2017    Normal exam    ENDOSCOPY N/A 02/11/2019    Gastritis    ENDOSCOPY N/A 09/01/2020    Non-erosive gastritis with hemorrhage    ENDOSCOPY N/A 02/10/2021    Esophagitis    FOOT SURGERY Left     INCISION AND DRAINAGE OF WOUND Left 09/2007    spider bite     PT Assessment (Last 12 Hours)       PT Evaluation and Treatment       [facility] Name 04/04/24 0850 04/04/24 0746       Physical Therapy Time and Intention    Subjective Information complains of;pain  - --  -    Document Type therapy note (daily note)  - --    Mode of Treatment physical therapy  - --    Session Not Performed -- other (see comments)  -    Comment, Session Not Performed -- just got to chair, waiting for breakfast  -[facility] Name 04/04/24 0850          General Information    Existing Precautions/Restrictions fall  cam boot  on LLE with transfers or walking; WBAT  -     Limitations/Impairments safety/cognitive  -AH       Row Name 04/04/24 0850          Pain    Pretreatment Pain Rating 7/10  -     Posttreatment Pain Rating 7/10  -     Pain Location - abdomen  -     Pain Intervention(s) Repositioned;Ambulation/increased activity  -[facility] Name 04/04/24 0850          Mobility    Extremity Weight-bearing Status left lower extremity  -     Left Lower Extremity (Weight-bearing Status) weight-bearing as tolerated (WBAT)  with cam boot on per Dr. Cerrato  -[facility] Name 04/04/24 0850          Bed Mobility    Comment, (Bed Mobility) CHAIR  -AH       Row Name 04/04/24 0850          Sit-Stand Transfer    Sit-Stand [place] (Transfers) contact guard;verbal cues;standby assist  -[facility] Name 04/04/24 0850          Stand-Sit Transfer    Stand-Sit [place] (Transfers) contact guard;verbal cues  -AH       Row Name 04/04/24 0850          Gait/Stairs (Locomotion)    [place] Level (Gait) verbal cues;contact guard  -     Assistive Device (Gait) walker,  front-wheeled  -     Distance in Feet (Gait) 50  -AH       Row Name 04/04/24 0850          Motor Skills    Comments, Therapeutic Exercise BLE AROM 10 X 2 reps  -AH       Row Name             Wound 06/15/23 0102 Left anterior plantar    Wound - Properties Group Placement Date: 06/15/23  -SM Placement Time: 0102  -SM Side: Left  -SM Orientation: anterior  -SM Location: plantar  -SM    Retired Wound - Properties Group Placement Date: 06/15/23  -SM Placement Time: 0102  -SM Side: Left  -SM Orientation: anterior  -SM Location: plantar  -SM    Retired Wound - Properties Group Date first assessed: 06/15/23  -SM Time first assessed: 0102  -SM Side: Left  -SM Location: plantar  -SM      Row Name             Wound 08/22/23 0140 Left posterior plantar    Wound - Properties Group Placement Date: 08/22/23  -AM Placement Time: 0140  -AM Present on Original Admission: Y  -AM Side: Left  -AM Orientation: posterior  -AM Location: plantar  -AM    Retired Wound - Properties Group Placement Date: 08/22/23  -AM Placement Time: 0140  -AM Present on Original Admission: Y  -AM Side: Left  -AM Orientation: posterior  -AM Location: plantar  -AM    Retired Wound - Properties Group Date first assessed: 08/22/23  -AM Time first assessed: 0140  -AM Present on Original Admission: Y  -AM Side: Left  -AM Location: plantar  -AM      Row Name             Wound Left lateral foot Diabetic Ulcer    Wound - Properties Group Side: Left  -MH Orientation: lateral  -MH Location: foot  -JACIEL, 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;diabetic foot ulcer/gangrene  Primary Wound Type: Diabetic ulc  -JACIEL Wound Outcome: Amputation  -JACIEL Stage, Pressure Injury : other (see comments)  -JACIEL, full thickness starting 10/20/21     Retired Wound - Properties Group Side: Left  -MH Orientation: lateral  -MH Location: foot  -JACIEL, 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;diabetic foot ulcer/gangrene  Primary Wound Type: Diabetic ulc  -JACIEL Stage, Pressure Injury : other (see comments)  -JACIEL, full thickness starting  10/20/21  Wound Outcome: Amputation  -JACIEL    Retired Wound - Properties Group Side: Left  - Location: foot  -JACIEL, 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;diabetic foot ulcer/gangrene  Primary Wound Type: Diabetic ulc  -JACIEL Wound Outcome: Amputation  -JACIEL      Row Name 04/04/24 0850          Plan of Care Review    Plan of Care Reviewed With patient  -     Progress no change  -     Outcome Evaluation pt restless, wanting to go home, dong L CAM boot, sit-stand cga-sba, pt amb 50 feet rwx cga, trans to chair cga, BLE AROM 10 x 2 reps, cues to stay on task  -       Row Name 04/04/24 0850          Positioning and Restraints    Pre-Treatment Position sitting in chair/recliner  -     Post Treatment Position chair  -AH     In Chair reclined;call light within reach;encouraged to call for assist;exit alarm on;with other staff;notified [facility]  -               User Key  (r) = Recorded By, (t) = Taken By, (c) = Cosigned By      Initials Name Provider Type     Chery Rosado, PTA Physical Therapist Assistant    Yuliana Lisa, RN Registered Nurse    MH Haase, Mallory L, RN Registered Nurse    Faviola Kunz RN Registered Nurse    Michelle Diaz, RN Registered Nurse                    Physical Therapy Education       Title: PT OT SLP Therapies (In Progress)       Topic: Physical Therapy (In Progress)       Point: Mobility training (Done)       Learning Progress Summary             Patient Acceptance, E,TB,D, VU,NR by  at 3/29/2024 1009    Comment: education re: purpose of PT/importance of activity, safety/falls prevention, NWB L LE, improved tech w/ bed mobility, positioning for pressure relief                         Point: Home exercise program (Not Started)       Learner Progress:  Not documented in this visit.              Point: Precautions (Done)       Learning Progress Summary             Patient Acceptance, E,TB,D, VU,NR by  at 3/29/2024 1009    Comment: education re: purpose of PT/importance of activity,  safety/falls prevention, NWB L LE, improved tech w/ bed mobility, positioning for pressure relief                                         User Key       Initials Effective Dates Name Provider Type Discipline     08/02/18 -  Melly Mustafa, PT Physical Therapist PT                  PT Recommendation and Plan     Plan of Care Reviewed With: patient  Progress: no change  Outcome Evaluation: pt restless, wanting to go home, dong L CAM boot, sit-stand cga-sba, pt amb 50 feet rwx cga, trans to chair cga, BLE AROM 10 x 2 reps, cues to stay on task   Outcome Measures       Row Name 04/04/24 0900 04/03/24 0800 04/02/24 0800       How much help from another person do you currently need...    Turning from your back to your side while in flat bed without using bedrails? 4  -AH 3  -AH 4  -AH    Moving from lying on back to sitting on the side of a flat bed without bedrails? 4  -AH 3  -AH 3  -AH    Moving to and from a bed to a chair (including a wheelchair)? 4  -AH 3  -AH 3  -AH    Standing up from a chair using your arms (e.g., wheelchair, bedside chair)? 4  -AH 3  -AH 3  -AH    Climbing 3-5 steps with a railing? 3  -AH 2  -AH 2  -AH    To walk in hospital room? 3  -AH 3  -AH 3  -AH    AM-PAC 6 Clicks Score (PT) 22  -AH 17  -AH 18  -AH    Highest Level of Mobility Goal 7 --> Walk 25 feet or more  -AH 5 --> Static standing  -AH 6 --> Walk 10 steps or more  -AH       Functional Assessment    Outcome Measure Options AM-PAC 6 Clicks Basic Mobility (PT)  - AM-PAC 6 Clicks Basic Mobility (PT)  - AM-PAC 6 Clicks Basic Mobility (PT)  -      Row Name 04/01/24 1300             How much help from another person do you currently need...    Turning from your back to your side while in flat bed without using bedrails? 4  -AH      Moving from lying on back to sitting on the side of a flat bed without bedrails? 3  -AH      Moving to and from a bed to a chair (including a wheelchair)? 3  -AH      Standing up from a chair using your  arms (e.g., wheelchair, bedside chair)? 3  -AH      Climbing 3-5 steps with a railing? 2  -AH      To walk in hospital room? 3  -AH      AM-PAC 6 Clicks Score (PT) 18  -      Highest Level of Mobility Goal 6 --> Walk 10 steps or more  -         Functional Assessment    Outcome Measure Options AM-PAC 6 Clicks Basic Mobility (PT)  -                User Key  (r) = Recorded By, (t) = Taken By, (c) = Cosigned By      Initials Name Provider Type     Chery Rosado PTA Physical Therapist Assistant                     Time Calculation:    PT Charges       Row Name 04/04/24 0932             Time Calculation    Start Time 0850  -      Stop Time 0920  -      Time Calculation (min) 30 min  -      PT Received On 04/04/24  -         Time Calculation- PT    Total Timed Code Minutes- PT 30 minute(s)  -         Timed Charges    69290 - PT Therapeutic Exercise Minutes 15  -AH      76540 - Gait Training Minutes  15  -AH         Total Minutes    Timed Charges Total Minutes 30  -AH       Total Minutes 30  -AH                User Key  (r) = Recorded By, (t) = Taken By, (c) = Cosigned By      Initials Name Provider Type     Chery Rosado PTA Physical Therapist Assistant                  Therapy Charges for Today       Code Description Service Date Service Provider Modifiers Qty    62563259129 HC PT THER PROC EA 15 MIN 4/3/2024 Chery Rosado, PTA GP 1    55609166340 HC GAIT TRAINING EA 15 MIN 4/3/2024 Chery Rosado, PTA GP 1    69508833453 HC PT THER PROC EA 15 MIN 4/4/2024 Chery Rosado, PTA GP 1    15892353771 HC GAIT TRAINING EA 15 MIN 4/4/2024 Chery Rosado, PTA GP 1            PT G-Codes  Outcome Measure Options: AM-PAC 6 Clicks Basic Mobility (PT)  AM-PAC 6 Clicks Score (PT): 22  AM-PAC 6 Clicks Score (OT): 17    Chery Rosado PTA  4/4/2024

## 2024-04-04 NOTE — CASE MANAGEMENT/SOCIAL WORK
Continued Stay Note  [facility]     Patient Name: Erick Luong  MRN: [id number]  Today's Date: 4/4/2024    Admit Date: 3/26/2024    Plan: Home   Discharge Plan       Row Name 04/04/24 1121       Plan    Plan Home    Patient/Family in Agreement with Plan yes    Plan Comments Spoke with pt's spouse, Joan [phone], about snf. She still hopes to take pt home at d/c. She says this is not normal for him. If she absolutely has to send him to a rehab, she will, but right now she still plans to take pt home.                   Discharge Codes    No documentation.                 Expected Discharge Date and Time       Expected Discharge Date Expected Discharge Time    Apr 3, 2024               MEJIA Carreno

## 2024-04-05 LAB
ALBUMIN SERPL-MCNC: 3.8 G/DL (ref 3.5–5.2)
ALBUMIN/GLOB SERPL: 1.1 G/DL
ALP SERPL-CCNC: 138 U/L (ref 39–117)
ALT SERPL W P-5'-P-CCNC: 18 U/L (ref 1–41)
ANION GAP SERPL CALCULATED.3IONS-SCNC: 12 MMOL/L (ref 5–15)
ANION GAP SERPL CALCULATED.3IONS-SCNC: 12 MMOL/L (ref 5–15)
AST SERPL-CCNC: 22 U/L (ref 1–40)
BASOPHILS # BLD AUTO: 0.04 10*3/MM3 (ref 0–0.2)
BASOPHILS NFR BLD AUTO: 0.6 % (ref 0–1.5)
BILIRUB SERPL-MCNC: 0.4 MG/DL (ref 0–1.2)
BUN SERPL-MCNC: 31 MG/DL (ref 8–23)
BUN SERPL-MCNC: 31 MG/DL (ref 8–23)
BUN/CREAT SERPL: 15.7 (ref 7–25)
BUN/CREAT SERPL: 15.7 (ref 7–25)
CALCIUM SPEC-SCNC: 11.1 MG/DL (ref 8.6–10.5)
CALCIUM SPEC-SCNC: 11.1 MG/DL (ref 8.6–10.5)
CHLORIDE SERPL-SCNC: 102 MMOL/L (ref 98–107)
CHLORIDE SERPL-SCNC: 102 MMOL/L (ref 98–107)
CO2 SERPL-SCNC: 25 MMOL/L (ref 22–29)
CO2 SERPL-SCNC: 25 MMOL/L (ref 22–29)
CREAT SERPL-MCNC: 1.98 MG/DL (ref 0.76–1.27)
CREAT SERPL-MCNC: 1.98 MG/DL (ref 0.76–1.27)
DEPRECATED RDW RBC AUTO: 45.7 FL (ref 37–54)
EGFRCR SERPLBLD CKD-EPI 2021: 36.3 ML/MIN/1.73
EGFRCR SERPLBLD CKD-EPI 2021: 36.3 ML/MIN/1.73
EOSINOPHIL # BLD AUTO: 0.34 10*3/MM3 (ref 0–0.4)
EOSINOPHIL NFR BLD AUTO: 4.9 % (ref 0.3–6.2)
ERYTHROCYTE [DISTWIDTH] IN BLOOD BY AUTOMATED COUNT: 14.1 % (ref 12.3–15.4)
GLOBULIN UR ELPH-MCNC: 3.4 GM/DL
GLUCOSE BLDC GLUCOMTR-MCNC: 149 MG/DL (ref 70–130)
GLUCOSE BLDC GLUCOMTR-MCNC: 160 MG/DL (ref 70–130)
GLUCOSE BLDC GLUCOMTR-MCNC: 217 MG/DL (ref 70–130)
GLUCOSE BLDC GLUCOMTR-MCNC: 239 MG/DL (ref 70–130)
GLUCOSE SERPL-MCNC: 173 MG/DL (ref 65–99)
GLUCOSE SERPL-MCNC: 173 MG/DL (ref 65–99)
HCT VFR BLD AUTO: 34.5 % (ref 37.5–51)
HGB BLD-MCNC: 10.9 G/DL (ref 13–17.7)
IMM GRANULOCYTES # BLD AUTO: 0.02 10*3/MM3 (ref 0–0.05)
IMM GRANULOCYTES NFR BLD AUTO: 0.3 % (ref 0–0.5)
LYMPHOCYTES # BLD AUTO: 1.93 10*3/MM3 (ref 0.7–3.1)
LYMPHOCYTES NFR BLD AUTO: 27.8 % (ref 19.6–45.3)
MAGNESIUM SERPL-MCNC: 2.3 MG/DL (ref 1.6–2.4)
MCH RBC QN AUTO: 27.9 PG (ref 26.6–33)
MCHC RBC AUTO-ENTMCNC: 31.6 G/DL (ref 31.5–35.7)
MCV RBC AUTO: 88.5 FL (ref 79–97)
MONOCYTES # BLD AUTO: 0.59 10*3/MM3 (ref 0.1–0.9)
MONOCYTES NFR BLD AUTO: 8.5 % (ref 5–12)
NEUTROPHILS NFR BLD AUTO: 4.01 10*3/MM3 (ref 1.7–7)
NEUTROPHILS NFR BLD AUTO: 57.9 % (ref 42.7–76)
NRBC BLD AUTO-RTO: 0 /100 WBC (ref 0–0.2)
PLATELET # BLD AUTO: 321 10*3/MM3 (ref 140–450)
PMV BLD AUTO: 10.6 FL (ref 6–12)
POTASSIUM SERPL-SCNC: 4.4 MMOL/L (ref 3.5–5.2)
POTASSIUM SERPL-SCNC: 4.4 MMOL/L (ref 3.5–5.2)
PROT SERPL-MCNC: 7.2 G/DL (ref 6–8.5)
RBC # BLD AUTO: 3.9 10*6/MM3 (ref 4.14–5.8)
SODIUM SERPL-SCNC: 139 MMOL/L (ref 136–145)
SODIUM SERPL-SCNC: 139 MMOL/L (ref 136–145)
WBC NRBC COR # BLD AUTO: 6.93 10*3/MM3 (ref 3.4–10.8)

## 2024-04-05 PROCEDURE — 97535 SELF CARE MNGMENT TRAINING: CPT

## 2024-04-05 PROCEDURE — 63710000001 INSULIN DETEMIR PER 5 UNITS: Performed by: NURSE PRACTITIONER

## 2024-04-05 PROCEDURE — 97116 GAIT TRAINING THERAPY: CPT

## 2024-04-05 PROCEDURE — 80053 COMPREHEN METABOLIC PANEL: CPT | Performed by: NURSE PRACTITIONER

## 2024-04-05 PROCEDURE — 82948 REAGENT STRIP/BLOOD GLUCOSE: CPT

## 2024-04-05 PROCEDURE — 85025 COMPLETE CBC W/AUTO DIFF WBC: CPT | Performed by: INTERNAL MEDICINE

## 2024-04-05 PROCEDURE — 63710000001 INSULIN LISPRO (HUMAN) PER 5 UNITS: Performed by: NURSE PRACTITIONER

## 2024-04-05 PROCEDURE — 99231 SBSQ HOSP IP/OBS SF/LOW 25: CPT | Performed by: INTERNAL MEDICINE

## 2024-04-05 PROCEDURE — 63710000001 INSULIN REGULAR HUMAN PER 5 UNITS: Performed by: EMERGENCY MEDICINE

## 2024-04-05 PROCEDURE — 97110 THERAPEUTIC EXERCISES: CPT

## 2024-04-05 PROCEDURE — 25010000002 LINEZOLID 600 MG/300ML SOLUTION: Performed by: INTERNAL MEDICINE

## 2024-04-05 PROCEDURE — 83735 ASSAY OF MAGNESIUM: CPT | Performed by: NURSE PRACTITIONER

## 2024-04-05 PROCEDURE — 25010000002 MORPHINE PER 10 MG: Performed by: INTERNAL MEDICINE

## 2024-04-05 RX ORDER — LINEZOLID 600 MG/1
600 TABLET, FILM COATED ORAL EVERY 12 HOURS SCHEDULED
Status: DISCONTINUED | OUTPATIENT
Start: 2024-04-05 | End: 2024-04-11 | Stop reason: HOSPADM

## 2024-04-05 RX ORDER — PANTOPRAZOLE SODIUM 40 MG/10ML
40 INJECTION, POWDER, LYOPHILIZED, FOR SOLUTION INTRAVENOUS EVERY 12 HOURS SCHEDULED
Status: DISCONTINUED | OUTPATIENT
Start: 2024-04-05 | End: 2024-04-07

## 2024-04-05 RX ORDER — PANTOPRAZOLE SODIUM 40 MG/1
40 TABLET, DELAYED RELEASE ORAL
Status: DISCONTINUED | OUTPATIENT
Start: 2024-04-05 | End: 2024-04-05

## 2024-04-05 RX ADMIN — ROSUVASTATIN CALCIUM 10 MG: 10 TABLET, FILM COATED ORAL at 20:36

## 2024-04-05 RX ADMIN — OXYCODONE HYDROCHLORIDE 10 MG: 5 TABLET ORAL at 18:40

## 2024-04-05 RX ADMIN — LACTULOSE 20 G: 20 SOLUTION ORAL at 20:35

## 2024-04-05 RX ADMIN — INSULIN DETEMIR 30 UNITS: 100 INJECTION, SOLUTION SUBCUTANEOUS at 21:00

## 2024-04-05 RX ADMIN — SUCRALFATE 1 G: 1 SUSPENSION ORAL at 09:30

## 2024-04-05 RX ADMIN — DOCUSATE SODIUM AND SENNOSIDES 1 TABLET: 8.6; 5 TABLET, FILM COATED ORAL at 09:30

## 2024-04-05 RX ADMIN — INSULIN LISPRO 4 UNITS: 100 INJECTION, SOLUTION INTRAVENOUS; SUBCUTANEOUS at 18:40

## 2024-04-05 RX ADMIN — PREGABALIN 50 MG: 50 CAPSULE ORAL at 09:30

## 2024-04-05 RX ADMIN — TIMOLOL MALEATE 1 DROP: 2.5 SOLUTION/ DROPS OPHTHALMIC at 20:52

## 2024-04-05 RX ADMIN — TAMSULOSIN HYDROCHLORIDE 0.4 MG: 0.4 CAPSULE ORAL at 09:31

## 2024-04-05 RX ADMIN — CARVEDILOL 3.12 MG: 3.12 TABLET, FILM COATED ORAL at 18:40

## 2024-04-05 RX ADMIN — MORPHINE SULFATE 2 MG: 2 INJECTION, SOLUTION INTRAMUSCULAR; INTRAVENOUS at 00:24

## 2024-04-05 RX ADMIN — MIDODRINE HYDROCHLORIDE 2.5 MG: 2.5 TABLET ORAL at 09:32

## 2024-04-05 RX ADMIN — LINEZOLID 600 MG: 600 TABLET, FILM COATED ORAL at 21:00

## 2024-04-05 RX ADMIN — Medication 10 ML: at 09:36

## 2024-04-05 RX ADMIN — PREGABALIN 100 MG: 100 CAPSULE ORAL at 20:36

## 2024-04-05 RX ADMIN — Medication 1 APPLICATION: at 09:31

## 2024-04-05 RX ADMIN — INSULIN LISPRO 8 UNITS: 100 INJECTION, SOLUTION INTRAVENOUS; SUBCUTANEOUS at 12:53

## 2024-04-05 RX ADMIN — MORPHINE SULFATE 2 MG: 2 INJECTION, SOLUTION INTRAMUSCULAR; INTRAVENOUS at 06:03

## 2024-04-05 RX ADMIN — LACTULOSE 20 G: 20 SOLUTION ORAL at 09:30

## 2024-04-05 RX ADMIN — OXYCODONE HYDROCHLORIDE AND ACETAMINOPHEN 1000 MG: 500 TABLET ORAL at 09:31

## 2024-04-05 RX ADMIN — ZINC SULFATE 220 MG (50 MG) CAPSULE 220 MG: CAPSULE at 09:31

## 2024-04-05 RX ADMIN — DONEPEZIL HYDROCHLORIDE 10 MG: 10 TABLET, FILM COATED ORAL at 09:33

## 2024-04-05 RX ADMIN — INSULIN HUMAN 10 UNITS: 100 INJECTION, SOLUTION PARENTERAL at 09:29

## 2024-04-05 RX ADMIN — ASPIRIN 81 MG: 81 TABLET, COATED ORAL at 09:33

## 2024-04-05 RX ADMIN — PANTOPRAZOLE SODIUM 40 MG: 40 INJECTION, POWDER, FOR SOLUTION INTRAVENOUS at 21:00

## 2024-04-05 RX ADMIN — DOCUSATE SODIUM AND SENNOSIDES 1 TABLET: 8.6; 5 TABLET, FILM COATED ORAL at 20:34

## 2024-04-05 RX ADMIN — LINEZOLID 600 MG: 600 INJECTION, SOLUTION INTRAVENOUS at 12:58

## 2024-04-05 RX ADMIN — SUCRALFATE 1 G: 1 SUSPENSION ORAL at 18:40

## 2024-04-05 RX ADMIN — APIXABAN 5 MG: 5 TABLET, FILM COATED ORAL at 09:33

## 2024-04-05 RX ADMIN — MIDODRINE HYDROCHLORIDE 2.5 MG: 2.5 TABLET ORAL at 18:40

## 2024-04-05 RX ADMIN — ISOSORBIDE MONONITRATE 30 MG: 30 TABLET, EXTENDED RELEASE ORAL at 09:31

## 2024-04-05 RX ADMIN — Medication 10 ML: at 20:52

## 2024-04-05 RX ADMIN — MIDODRINE HYDROCHLORIDE 2.5 MG: 2.5 TABLET ORAL at 12:53

## 2024-04-05 RX ADMIN — INSULIN HUMAN 10 UNITS: 100 INJECTION, SOLUTION PARENTERAL at 12:53

## 2024-04-05 RX ADMIN — TIMOLOL MALEATE 1 DROP: 2.5 SOLUTION/ DROPS OPHTHALMIC at 09:29

## 2024-04-05 RX ADMIN — POLYETHYLENE GLYCOL 3350 17 G: 17 POWDER, FOR SOLUTION ORAL at 09:29

## 2024-04-05 RX ADMIN — CARVEDILOL 3.12 MG: 3.12 TABLET, FILM COATED ORAL at 09:32

## 2024-04-05 RX ADMIN — Medication 6 MG: at 20:34

## 2024-04-05 RX ADMIN — FAMOTIDINE 20 MG: 20 TABLET, FILM COATED ORAL at 09:33

## 2024-04-05 RX ADMIN — MORPHINE SULFATE 2 MG: 2 INJECTION, SOLUTION INTRAMUSCULAR; INTRAVENOUS at 09:35

## 2024-04-05 RX ADMIN — INSULIN HUMAN 10 UNITS: 100 INJECTION, SOLUTION PARENTERAL at 18:40

## 2024-04-05 RX ADMIN — INSULIN LISPRO 8 UNITS: 100 INJECTION, SOLUTION INTRAVENOUS; SUBCUTANEOUS at 09:29

## 2024-04-05 RX ADMIN — Medication 1 APPLICATION: at 20:35

## 2024-04-05 RX ADMIN — SUCRALFATE 1 G: 1 SUSPENSION ORAL at 12:53

## 2024-04-05 NOTE — PLAN OF CARE
Goal Outcome Evaluation:  Plan of Care Reviewed With: patient        Progress: improving  Outcome Evaluation: PT tx completed. Pt sitting in chair, seems less interactive today. Mostly nonverbal, at times had to repeat question for him to answer. Reports abdominal pn. MaxA don/doffing cam boot LLE. Transfer sit<>stand CG/Eric, amb short distance with r wx CG/Eric. Decreased distance due to wound on L foot. AROM BLE's in chair x 20 reps. Recommend SNF at discharge due to the need for care, strength, and cognitive level.  Or 24/7 care at home, until pt physically and mentally clear for safety.

## 2024-04-05 NOTE — PLAN OF CARE
Problem: Adult Inpatient Plan of Care  Goal: Plan of Care Review  Outcome: Ongoing, Progressing  Flowsheets (Taken 4/5/2024 1555)  Outcome Evaluation: Patient RA. IV CDI, IVF abx infusing per order. Up standby with walker. Sitter at bedside. Intermittently confused. Left foot wound drsg changed per order. Boot when ambulating VSS, safety maintained.

## 2024-04-05 NOTE — PROGRESS NOTES
Nephrology ([facility]) Progress Note      Patient:  Erick Luong  YOB: 1955  Date of Service: 4/5/2024  MRN: [id number]   Acct: [id number]   Primary Care Physician: Del Shetty MD  Advance Directive:   Code Status and Medical Interventions:   Ordered at: 03/26/24 1815     Level Of Support Discussed With:    Health Care Surrogate     Code Status (Patient has no pulse and is not breathing):    CPR (Attempt to Resuscitate)     Medical Interventions (Patient has pulse or is breathing):    Full Support     Admit Date: 3/26/2024       Hospital Day: 10  Referring Provider: No ref. provider found      Patient personally seen and examined.  Complete chart including Consults, Notes, Operative Reports, Labs, Cardiology, and Radiology studies reviewed as able.        Subjective:  Erick Luong is a 68 y.o. male for whom we were consulted for evaluation and treatment of acute kidney injury.  He has stage IIIb chronic kidney disease baseline, follows with Dr Lomas in our office.  Baseline creatinine approximately 1.8. He has history of insulin-dependent type 2 diabetes, hypertension, abdominal obesity, obstructive sleep apnea, diabetic foot ulcer and coronary artery disease.   Patient presented to ER on 3/26 with altered mental status. Had debridement of ongoing wound on left foot the day prior. Left foot warm and erythremic on arrival to ER. Noted to have elevated blood glucose over 400. Initial creatinine of 1.9.  Admitted to medical floor for sepsis due to left foot wound. Patient has been agitated and confused during the admission, requiring wrist restraints due to pulling at lines and tubes. Renal function and mentation have been improving. Now has a sitter present in room due to repeatedly getting up unattended    Today is awake and responding appropriately to questions. However he has an extremely flat affect and his answers are mainly yes or no without elaboration.  He does  mention that his back hurts but no other complaints. Urine output nonoliguric     Allergies:  Cefepime, Bactrim [sulfamethoxazole-trimethoprim], Vancomycin, Zolpidem, Zolpidem tartrate, and Metronidazole    Home Meds:  Medications Prior to Admission   Medication Sig Dispense Refill Last Dose    amitriptyline (ELAVIL) 25 MG tablet Take 2 tablets by mouth Daily at bedtime. (Patient not taking: Reported on 3/27/2024) 60 tablet 1 Not Taking    apixaban (ELIQUIS) 5 MG tablet tablet Take 1 tablet by mouth Every 12 (Twelve) Hours.       ascorbic acid (VITAMIN C) 1000 MG tablet Take 1 tablet by mouth Daily. 30 tablet 3     Aspirin 81 MG capsule Take 81 mg by mouth Daily.       bumetanide (BUMEX) 1 MG tablet Take 1 tablet every day by oral route for 30 days. 30 tablet 0     bumetanide (BUMEX) 2 MG tablet Take 1 tablet by mouth Daily. Take 2 tablets in morning;1 tablet at night (Patient not taking: Reported on 3/27/2024)   Not Taking    busPIRone (BUSPAR) 10 MG tablet Take 1 tablet by mouth 3 (Three) Times a Day.       calcitriol (ROCALTROL) 0.5 MCG capsule Take 1 capsule by mouth Daily. 90 capsule 4     calcitriol (ROCALTROL) 0.5 MCG capsule Take 1 capsule every day by oral route for 90 days. (Patient not taking: Reported on 3/27/2024) 90 capsule 4 Not Taking    carvedilol (COREG) 3.125 MG tablet Take 1 tablet twice a day by oral route. 60 tablet 4     carvedilol (COREG) 6.25 MG tablet Take 1 tablet by mouth 2 (Two) Times a Day. (Patient not taking: Reported on 3/27/2024) 180 tablet 4 Not Taking    Cholecalciferol (D-5000) 125 MCG (5000 UT) tablet Take 1 tablet by mouth Daily Before Lunch. 30 tablet 2     cloNIDine (CATAPRES) 0.1 MG tablet Take 1 tablet by mouth Every 12 (Twelve) Hours. (Patient not taking: Reported on 3/27/2024) 60 tablet 2 Not Taking    Diclofenac Sodium (VOLTAREN) 1 % gel gel Apply 2 g topically to the appropriate area as directed 4 (Four) Times a Day As Needed. 300 g 11     Diclofenac Sodium (VOLTAREN) 1  % gel gel Apply 2 g topically to the appropriate area as directed 4 (Four) Times a Day. (Patient not taking: Reported on 3/27/2024) 300 g 11 Not Taking    donepezil (ARICEPT) 10 MG tablet Take 1 tablet by mouth Daily. (Patient not taking: Reported on 3/27/2024) 90 tablet 4 Not Taking    Dulaglutide (Trulicity) 4.5 MG/0.5ML solution pen-injector Inject 0.5 mL under the skin into the appropriate area as directed 1 (One) Time Per Week. (Patient not taking: Reported on 3/27/2024) 2 mL 11 Not Taking    DULoxetine (CYMBALTA) 60 MG capsule Take 1 capsule by mouth Daily. 90 capsule 4     DULoxetine (CYMBALTA) 60 MG capsule Take 1 capsule by mouth Daily. (Patient not taking: Reported on 3/27/2024) 90 capsule 4 Not Taking    DULoxetine (CYMBALTA) 60 MG capsule Take 1 capsule by mouth Daily. (Patient not taking: Reported on 3/27/2024) 90 capsule 4 Not Taking    empagliflozin (JARDIANCE) 25 MG tablet tablet Take 1 tablet by mouth Daily. (Patient not taking: Reported on 3/27/2024) 30 tablet 2 Not Taking    famotidine (PEPCID) 20 MG tablet Take 1 tablet twice a day by oral route. 180 tablet 4     fluticasone (FLONASE) 50 MCG/ACT nasal spray 1 spray into the nostril(s) as directed by provider Daily. (Patient taking differently: 1 spray into the nostril(s) as directed by provider 2 (Two) Times a Day.) 16 g 1     HYDROcodone-acetaminophen (NORCO) 7.5-325 MG per tablet Take 1 tablet by mouth 2 (Two) Times a Day As Needed. (Patient not taking: Reported on 3/27/2024) 40 tablet 0 Not Taking    Insulin Glargine (Lantus SoloStar) 100 UNIT/ML injection pen Inject 20 Units under the skin into the appropriate area as directed Every Evening. 15 mL 3     Insulin Regular Human, Conc, (HumuLIN R U-500 KwikPen) 500 UNIT/ML solution pen-injector CONCENTRATED injection Inject 120 Units under the skin into the appropriate area as directed 2 (Two) Times a Day with breakfast and dinner. (Patient not taking: Reported on 3/27/2024) 18 mL 5 Not Taking     Insulin Regular Human, Conc, (HumuLIN R U-500 KwikPen) 500 UNIT/ML solution pen-injector CONCENTRATED injection Inject 40 Units under the skin into the appropriate area with regular meals AND 40 Units with large meals. 54 mL 3     isosorbide mononitrate (IMDUR) 30 MG 24 hr tablet Take 1 tablet by mouth Daily.       magnesium hydroxide (Milk of Magnesia) 400 MG/5ML suspension Take 30 mL every day by oral route for 30 days. 900 mL 0     magnesium hydroxide (Milk of Magnesia) 400 MG/5ML suspension Take 30mL by mouth once daily for 30 days. (Patient not taking: Reported on 3/27/2024) 900 mL 0 Not Taking    melatonin 3 MG tablet Take 1 tablet by mouth At Night As Needed for Sleep.       methylcellulose (Citrucel) oral powder Mix 5g as directed and drink twice daily for 90 days. 900 g 10     metoclopramide (REGLAN) 5 MG tablet Take 1 tablet by mouth 3 times a day.       midodrine (PROAMATINE) 2.5 MG tablet Take 1 tablet by mouth 3 (Three) Times a Day Before Meals.       nitroglycerin (NITROSTAT) 0.4 MG SL tablet Dissolve 1 tablet by mouth every 5 minutes as needed for chest pain; MAX 3 tabs/24 hours.  If no improvement after 3 tabs go to ER 25 tablet 0     ondansetron (ZOFRAN) 4 MG tablet Take 1 tablet by mouth Every 6 (Six) Hours As Needed for Nausea or Vomiting.       oxyCODONE (ROXICODONE) 10 MG tablet Take 1 tablet by mouth twice a day as needed 60 tablet 0     pantoprazole (Protonix) 40 MG EC tablet Take 1 tablet by mouth 2 (Two) Times a Day. (Patient not taking: Reported on 3/27/2024) 180 tablet 4 Not Taking    polyethylene glycol (MIRALAX) 17 g packet Take 17 g by mouth Daily. Obtain OTC       prazosin (MINIPRESS) 1 MG capsule Take 1 capsule by mouth every night at bedtime. 30 capsule 2     pregabalin (LYRICA) 100 MG capsule Take 2 capsules by mouth Every Night.       pregabalin (LYRICA) 50 MG capsule Take 1 capsule by mouth Daily.       rosuvastatin (CRESTOR) 10 MG tablet Take 1 tablet by mouth Daily.        sennosides-docusate (PERICOLACE) 8.6-50 MG per tablet Take 1 tablet by mouth Every Night. Obtain OTC       sennosides-docusate (PERICOLACE) 8.6-50 MG per tablet Take 1 tablet by mouth every night at bedtime. (Patient not taking: Reported on 3/27/2024) 30 tablet 2 Not Taking    sodium hypochlorite (DAKIN'S 1/4 STRENGTH) 0.125 % solution topical solution 0.125% Apply to affected area twice daily (Patient not taking: Reported on 3/27/2024) 473 mL 3 Not Taking    sodium hypochlorite (DAKIN'S 1/4 STRENGTH) 0.125 % solution topical solution 0.125% Apply to affected area twice daily as directed (Patient not taking: Reported on 3/27/2024) 473 mL 5 Not Taking    sodium hypochlorite (DAKIN'S) 0.25 % topical solution Use to wound daily as instructed 473 mL 1     sucralfate (CARAFATE) 1 g tablet Take 1 tablet by mouth 3 times a day.       tamsulosin (FLOMAX) 0.4 MG capsule 24 hr capsule Take 1 capsule by mouth Daily. 90 capsule 1     timolol (TIMOPTIC) 0.5 % ophthalmic solution Administer 1 drop to both eyes 2 (Two) Times a Day.       valsartan (DIOVAN) 40 MG tablet Take 1 tablet by mouth Daily.       zinc sulfate (ZINCATE) 50 MG capsule Take 1 capsule by mouth Daily.          Medicines:  Current Facility-Administered Medications   Medication Dose Route Frequency Provider Last Rate Last Admin    acetaminophen (TYLENOL) tablet 650 mg  650 mg Oral Q4H PRN Raquel Duncan APRN   650 mg at 04/04/24 0002    Or    acetaminophen (TYLENOL) 160 MG/5ML oral solution 650 mg  650 mg Oral Q4H PRN Raquel Duncan APRN   650 mg at 03/27/24 2109    Or    acetaminophen (TYLENOL) suppository 650 mg  650 mg Rectal Q4H PRN Raquel Duncan APRN        apixaban (ELIQUIS) tablet 5 mg  5 mg Oral Q12H Raquel Duncan, APRN   5 mg at 04/04/24 2043    ascorbic acid (VITAMIN C) tablet 1,000 mg  1,000 mg Oral Daily Rene Maloney MD   1,000 mg at 04/04/24 0849    aspirin EC tablet 81 mg  81 mg Oral Daily Rene Maloney MD   81 mg at  04/04/24 0849    sennosides-docusate (PERICOLACE) 8.6-50 MG per tablet 1 tablet  1 tablet Oral BID Julia Adrian MD   1 tablet at 04/04/24 2043    And    polyethylene glycol (MIRALAX) packet 17 g  17 g Oral Daily Julia Adrian MD   17 g at 04/04/24 1222    And    bisacodyl (DULCOLAX) EC tablet 5 mg  5 mg Oral Daily PRN Julia Adrian MD        And    bisacodyl (DULCOLAX) suppository 10 mg  10 mg Rectal Daily PRN Julia Adrian MD        bumetanide (BUMEX) tablet 1 mg  1 mg Oral Daily Stewart Alvarez, APRN   1 mg at 04/04/24 0847    carvedilol (COREG) tablet 3.125 mg  3.125 mg Oral BID With Meals Rene Maloney MD   3.125 mg at 04/04/24 1742    dextrose (D50W) (25 g/50 mL) IV injection 25 g  25 g Intravenous Q15 Min PRN Raquel Duncan, APRN        dextrose (GLUTOSE) oral gel 15 g  15 g Oral Q15 Min PRN Raquel Duncan APRN   15 g at 03/27/24 1710    Diclofenac Sodium (VOLTAREN) 1 % gel 2 g  2 g Topical 4x Daily PRN Rene Maloney MD        dicyclomine (BENTYL) capsule 20 mg  20 mg Oral TID PRN Rene Maloney MD   20 mg at 04/02/24 2234    donepezil (ARICEPT) tablet 10 mg  10 mg Oral Daily Raquel Duncan APRN   10 mg at 04/04/24 0849    famotidine (PEPCID) tablet 20 mg  20 mg Oral Daily Rene Maloney MD   20 mg at 04/04/24 0847    glucagon (GLUCAGEN) injection 1 mg  1 mg Intramuscular Q15 Min PRN Raquel Duncan, APRN        haloperidol lactate (HALDOL) injection 5 mg  5 mg Intravenous Q6H PRN Rene Maloney MD   5 mg at 04/03/24 0026    insulin detemir (LEVEMIR) injection 30 Units  30 Units Subcutaneous Nightly Raquel Duncan APRN   30 Units at 04/04/24 2107    Insulin Lispro (humaLOG) injection 4-24 Units  4-24 Units Subcutaneous 4x Daily AC & at Bedtime Raquel Duncan APRN   8 Units at 04/04/24 1222    insulin regular (humuLIN R,novoLIN R) injection 10 Units  10 Units Subcutaneous TID AC Steve De Jesus MD   10 Units at  04/04/24 1743    isosorbide mononitrate (IMDUR) 24 hr tablet 30 mg  30 mg Oral Q24H Rene Maloney MD   30 mg at 04/04/24 0848    lactulose solution 20 g  20 g Oral TID Latanya Paez MD   20 g at 04/04/24 2043    Linezolid (ZYVOX) 600 mg 300 mL  600 mg Intravenous Q12H Julia Adrian  mL/hr at 04/04/24 2331 600 mg at 04/04/24 2331    magnesium hydroxide (MILK OF MAGNESIA) suspension 10 mL  10 mL Oral Daily PRN Rene Maloney MD   10 mL at 04/03/24 0414    melatonin tablet 6 mg  6 mg Oral Nightly Rene Maloney MD   6 mg at 04/04/24 2043    midodrine (PROAMATINE) tablet 2.5 mg  2.5 mg Oral TID AC Rene Maloney MD   2.5 mg at 04/04/24 1742    Morphine sulfate (PF) injection 2 mg  2 mg Intravenous Q3H PRN Rene Maloney MD   2 mg at 04/05/24 0603    mupirocin (BACTROBAN) 2 % nasal ointment 1 Application  1 Application Each Nare BID Nadia Polo APRN   1 Application at 04/04/24 2043    nitroglycerin (NITROSTAT) SL tablet 0.4 mg  0.4 mg Sublingual Q5 Min PRN Raquel Duncan, APRN        ondansetron ODT (ZOFRAN-ODT) disintegrating tablet 4 mg  4 mg Oral Q6H PRN Raquel Duncan, APRN   4 mg at 04/03/24 0537    Or    ondansetron (ZOFRAN) injection 4 mg  4 mg Intravenous Q6H PRN Raquel Duncan, APRN   4 mg at 03/29/24 1837    oxyCODONE (ROXICODONE) immediate release tablet 10 mg  10 mg Oral BID PRN Raquel Duncan, APRN   10 mg at 04/04/24 2329    pregabalin (LYRICA) capsule 100 mg  100 mg Oral Nightly Rene Maloney MD   100 mg at 04/04/24 2043    pregabalin (LYRICA) capsule 50 mg  50 mg Oral Daily Rene Maloney MD   50 mg at 04/04/24 0849    rosuvastatin (CRESTOR) tablet 10 mg  10 mg Oral Nightly Rene Maloney MD   10 mg at 04/04/24 2043    sodium chloride 0.9 % flush 10 mL  10 mL Intravenous PRN Steve De Jesus MD        sodium chloride 0.9 % flush 10 mL  10 mL Intravenous Q12H Raquel Duncan APRN   10 mL at 04/04/24 2039    sodium  chloride 0.9 % flush 10 mL  10 mL Intravenous PRN Raquel Duncan APRN        sodium chloride 0.9 % infusion 40 mL  40 mL Intravenous PRN Raquel Duncan, APRN        sucralfate (CARAFATE) 1 GM/10ML suspension 1 g  1 g Oral TID AC Rene Maloney MD   1 g at 04/04/24 1743    tamsulosin (FLOMAX) 24 hr capsule 0.4 mg  0.4 mg Oral Daily Rene Maloney MD   0.4 mg at 04/04/24 0849    timolol (TIMOPTIC) 0.25 % ophthalmic solution 1 drop  1 drop Both Eyes Q12H Rene Maloney MD   1 drop at 04/04/24 2047    zinc sulfate (ZINCATE) capsule 220 mg  220 mg Oral Daily Rene Maloney MD   220 mg at 04/04/24 0849       Past Medical History:  Past Medical History:   Diagnosis Date    Arthritis     Autonomic disease     CAD (coronary artery disease) 02/06/2017    Cervical radiculopathy 09/16/2021    Chronic constipation with acute exaccerbation 05/10/2021    Coronary artery disease     Degeneration of cervical intervertebral disc 08/11/2021    Diabetes mellitus     Diabetic foot ulcer 08/31/2020    Diabetic polyneuropathy associated with type 2 diabetes mellitus 01/18/2021    Elevated cholesterol     Gastroesophageal reflux disease 05/13/2019    Headache     HTN (hypertension), benign 05/03/2017    Hyperlipidemia     Hypertension     Mixed hyperlipidemia 02/07/2017    Multiple lung nodules 01/26/2020    5mm, 9 mm RLL identified 1/2020, not present 10/2019.    Myocardial infarction     Osteomyelitis 01/22/2020    Osteomyelitis of fifth toe of right foot 10/07/2019    Pancreatitis     Persistent insomnia 01/20/2020    Renal disorder     Sleep apnea 02/06/2017    Sleep apnea with use of continuous positive airway pressure (CPAP)     NON-COMPLIANT    Slow transit constipation 01/16/2019    Spinal stenosis in cervical region 09/16/2021    Vitamin D deficiency 03/02/2021       Past Surgical History:  Past Surgical History:   Procedure Laterality Date    ABDOMINAL SURGERY      AMPUTATION FOOT / TOE Left 10/2021     5th digit     ANTERIOR CERVICAL DISCECTOMY W/ FUSION N/A 2022    Procedure: CERVICAL DISCECTOMY ANTERIOR WITH FUSION C5-6 with possible plating of C5-7 with neuromonitoring and 1 c-arm;  Surgeon: Karel Soliz MD;  Location:  [facility] OR;  Service: Neurosurgery;  Laterality: N/A;    APPENDECTOMY      BACK SURGERY      CARDIAC CATHETERIZATION Left 2021    Procedure: Left Heart Cath w poss intervention left anatomical snuff box acess;  Surgeon: Omkar Charles DO;  Location:  [facility] CATH INVASIVE LOCATION;  Service: Cardiology;  Laterality: Left;    CARDIAC SURGERY      CATARACT EXTRACTION      CERVICAL SPINE SURGERY      COLONOSCOPY N/A 2017    Normal exam repeat in 5 years    COLONOSCOPY N/A 2019    Mild acute inflammation    COLONOSCOPY W/ POLYPECTOMY  2014    Hyperplastic polyp    CORONARY ARTERY BYPASS GRAFT  10/2015    ENDOSCOPY  2011    Gastritis with hemorrhage    ENDOSCOPY N/A 2017    Normal exam    ENDOSCOPY N/A 2019    Gastritis    ENDOSCOPY N/A 2020    Non-erosive gastritis with hemorrhage    ENDOSCOPY N/A 02/10/2021    Esophagitis    FOOT SURGERY Left     INCISION AND DRAINAGE OF WOUND Left 2007    spider bite       Family History  Family History   Problem Relation Age of Onset    Colon cancer Father     Heart disease Father     Colon cancer Sister     Colon polyps Sister     Alzheimer's disease Mother     Coronary artery disease Sister     Coronary artery disease Sister        Social History  Social History     Socioeconomic History    Marital status:    Tobacco Use    Smoking status: Former     Current packs/day: 0.00     Types: Cigarettes     Quit date:      Years since quittin.2    Smokeless tobacco: Never    Tobacco comments:     smoked in highschool   Vaping Use    Vaping status: Never Used   Substance and Sexual Activity    Alcohol use: No    Drug use: No    Sexual activity: Defer       Review of Systems:  History  obtained from chart review and the patient  General ROS: No fever or chills  Respiratory ROS: No cough, shortness of breath, wheezing  Cardiovascular ROS: No chest pain or palpitations  Gastrointestinal ROS: No abdominal pain or melena  Genito-Urinary ROS: No dysuria or hematuria  Psych ROS: No anxiety and depression  14 point ROS reviewed with the patient and negative except as noted above and in the HPI unless unable to obtain.    Objective:  Patient Vitals for the past 24 hrs:   BP Temp Temp src Pulse Resp SpO2   04/05/24 0801 139/65 97.3 °F (36.3 °C) Oral 72 16 100 %   04/05/24 0323 151/71 98.1 °F (36.7 °C) Oral 70 18 --   04/04/24 2307 138/59 97.8 °F (36.6 °C) Oral 79 18 98 %   04/04/24 1955 132/56 98.1 °F (36.7 °C) Oral 65 16 100 %   04/04/24 1742 135/68 -- -- 69 -- --   04/04/24 1606 135/68 97.8 °F (36.6 °C) Oral 69 16 100 %   04/04/24 1159 129/75 97.5 °F (36.4 °C) Oral 74 16 100 %       Intake/Output Summary (Last 24 hours) at 4/5/2024 0931  Last data filed at 4/5/2024 0047  Gross per 24 hour   Intake 960 ml   Output 226 ml   Net 734 ml     General: awake/alert   Chest:  clear to auscultation bilaterally without respiratory distress  CVS: regular rate and rhythm  Abdominal: soft, nontender, positive bowel sounds  Extremities:  bilateral 1+ edema  Skin: warm and dry without rash      Labs:  Results from last 7 days   Lab Units 04/05/24  0512 04/04/24  0514 04/03/24  0334   WBC 10*3/mm3 6.93 5.64 6.54   HEMOGLOBIN g/dL 10.9* 10.0* 9.0*   HEMATOCRIT % 34.5* 31.3* 28.9*   PLATELETS 10*3/mm3 321 251 197         Results from last 7 days   Lab Units 04/05/24  0512 04/04/24  0514 04/03/24  0334 04/01/24  0402 03/31/24  0526 03/30/24  0620   SODIUM mmol/L 139 139 139   < > 136 135*   POTASSIUM mmol/L 4.4 4.6 4.2   < > 3.6 3.5   CHLORIDE mmol/L 102 102 104   < > 101 100   CO2 mmol/L 25.0 26.0 25.0   < > 23.0 24.0   BUN mg/dL 31* 32* 37*   < > 51* 51*   CREATININE mg/dL 1.98* 1.78* 1.76*   < > 2.52* 2.74*   CALCIUM  mg/dL 11.1* 10.5 9.3   < > 8.6 8.2*   EGFR mL/min/1.73 36.3* 41.3* 41.9*   < > 27.2* 24.6*   BILIRUBIN mg/dL  --   --   --   --  0.5 0.5   ALK PHOS U/L  --   --   --   --  110 95   ALT (SGPT) U/L  --   --   --   --  19 19   AST (SGOT) U/L  --   --   --   --  30 32   GLUCOSE mg/dL 173* 185* 118*   < > 201* 104*    < > = values in this interval not displayed.       Radiology:   Imaging Results (Last 72 Hours)       Procedure Component Value Units Date/Time    XR Abdomen KUB [[id number]] Collected: 04/03/24 1345     Updated: 04/03/24 1349    Narrative:      EXAMINATION: XR ABDOMEN KUB- 4/3/2024 1:45 PM     HISTORY: abdominal distention; E11.628-Type 2 diabetes mellitus with  other skin complications; L08.9-Local infection of the skin and  subcutaneous tissue, unspecified; E11.65-Type 2 diabetes mellitus with  hyperglycemia; Z74.09-Other reduced mobility.     REPORT: Supine imaging of the abdomen was performed.     COMPARISON: KUB 10/7/2023.     A large amount of stool seen within the transverse colon and flexures,  likely representing constipation. No evidence of bowel obstruction is  identified. There are no definite urinary tract calculi. Cholecystectomy  clips are present. No acute osseous abnormality is identified.       Impression:      Extensive constipation within the transverse colon and  flexures.     This report was signed and finalized on 4/3/2024 1:46 PM by Dr. Percy Cesar MD.               Culture:  Blood Culture   Date Value Ref Range Status   03/26/2024 Staphylococcus aureus, MRSA (C)  Final     Comment:       Infectious disease consultation is highly recommended to rule out distant foci of infection.  Methicillin resistant Staphylococcus aureus, Patient may be an isolation risk.   03/26/2024 Staphylococcus aureus, MRSA (C)  Final     Comment:       Infectious disease consultation is highly recommended to rule out distant foci of infection.  Methicillin resistant Staphylococcus aureus, Patient may be  an isolation risk.         Assessment    Acute kidney injury, ATN--resolving  Baseline chronic kidney disease stage 3b  Type 2 diabetes, poorly controlled (A1c 10.8%)  Hypertension   Metabolic encephalopathy--improved  Anemia of CKD  Hypokalemia--improved  Sepsis due to infection of left foot wound  Hypercalcemia     Plan:  Renal function a little worse today. Will hold Bumex today, possibly can resume tomorrow  Stop Vitamin D3 and calcitriol  Check serum Vitamin D level, PTH      Stewart Alvarez, APRN  4/5/2024  09:31 CDT

## 2024-04-05 NOTE — THERAPY TREATMENT NOTE
Acute Care - Physical Therapy Treatment Note  [facility]     Patient Name: Erick Luong  : 1955  MRN: [id number]  Today's Date: 2024      Visit Dx:     ICD-10-CM ICD-9-CM   1. Diabetic foot infection  E11.628 250.80    L08.9 686.9   2. Poorly controlled diabetes mellitus  E11.65 250.00   3. Impaired functional mobility and activity tolerance [Z74.09]  Z74.09 V49.89     Patient Active Problem List   Diagnosis    Obesity, unspecified obesity severity, unspecified obesity type    Essential hypertension    Type 2 diabetes mellitus with hyperglycemia, with long-term current use of insulin    Nonsmoker    Anemia due to chronic kidney disease    Class 3 severe obesity due to excess calories with body mass index (BMI) of 40.0 to 44.9 in adult    Anasarca    Sleep apnea with use of continuous positive airway pressure (CPAP)    Medically noncompliant    Diabetic ulcer of left midfoot associated with type 2 diabetes mellitus, with fat layer exposed    Diabetic polyneuropathy associated with type 2 diabetes mellitus    Spinal stenosis in cervical region    Degeneration of cervical intervertebral disc    Cervical radiculopathy    Degeneration of lumbar or lumbosacral intervertebral disc    Cervical myelopathy    Bilateral carpal tunnel syndrome    CAD (coronary artery disease)    GERD without esophagitis    BPH without obstruction/lower urinary tract symptoms    Stage 3b chronic kidney disease    Chronic diastolic heart failure    Type 2 myocardial infarction due to heart failure    Left carpal tunnel syndrome    Syncope and collapse, recurrent episodes    Poorly-controlled hypertension    Rhinovirus    Peripheral vascular disease    Chronic kidney disease (CKD), stage IV (severe)    Diabetic foot infection    Sepsis     Past Medical History:   Diagnosis Date    Arthritis     Autonomic disease     CAD (coronary artery disease) 2017    Cervical radiculopathy 2021    Chronic constipation with acute  exaccerbation 05/10/2021    Coronary artery disease     Degeneration of cervical intervertebral disc 08/11/2021    Diabetes mellitus     Diabetic foot ulcer 08/31/2020    Diabetic polyneuropathy associated with type 2 diabetes mellitus 01/18/2021    Elevated cholesterol     Gastroesophageal reflux disease 05/13/2019    Headache     HTN (hypertension), benign 05/03/2017    Hyperlipidemia     Hypertension     Mixed hyperlipidemia 02/07/2017    Multiple lung nodules 01/26/2020    5mm, 9 mm RLL identified 1/2020, not present 10/2019.    Myocardial infarction     Osteomyelitis 01/22/2020    Osteomyelitis of fifth toe of right foot 10/07/2019    Pancreatitis     Persistent insomnia 01/20/2020    Renal disorder     Sleep apnea 02/06/2017    Sleep apnea with use of continuous positive airway pressure (CPAP)     NON-COMPLIANT    Slow transit constipation 01/16/2019    Spinal stenosis in cervical region 09/16/2021    Vitamin D deficiency 03/02/2021     Past Surgical History:   Procedure Laterality Date    ABDOMINAL SURGERY      AMPUTATION FOOT / TOE Left 10/2021    5th digit     ANTERIOR CERVICAL DISCECTOMY W/ FUSION N/A 8/5/2022    Procedure: CERVICAL DISCECTOMY ANTERIOR WITH FUSION C5-6 with possible plating of C5-7 with neuromonitoring and 1 c-arm;  Surgeon: Karel Soliz MD;  Location:  [facility] OR;  Service: Neurosurgery;  Laterality: N/A;    APPENDECTOMY      BACK SURGERY      CARDIAC CATHETERIZATION Left 02/08/2021    Procedure: Left Heart Cath w poss intervention left anatomical snuff box acess;  Surgeon: Omkar Charles DO;  Location:  [facility] CATH INVASIVE LOCATION;  Service: Cardiology;  Laterality: Left;    CARDIAC SURGERY      CATARACT EXTRACTION      CERVICAL SPINE SURGERY      COLONOSCOPY N/A 01/31/2017    Normal exam repeat in 5 years    COLONOSCOPY N/A 02/11/2019    Mild acute inflammation    COLONOSCOPY W/ POLYPECTOMY  03/04/2014    Hyperplastic polyp    CORONARY ARTERY BYPASS GRAFT  10/2015     ENDOSCOPY  04/13/2011    Gastritis with hemorrhage    ENDOSCOPY N/A 05/05/2017    Normal exam    ENDOSCOPY N/A 02/11/2019    Gastritis    ENDOSCOPY N/A 09/01/2020    Non-erosive gastritis with hemorrhage    ENDOSCOPY N/A 02/10/2021    Esophagitis    FOOT SURGERY Left     INCISION AND DRAINAGE OF WOUND Left 09/2007    spider bite     PT Assessment (Last 12 Hours)       PT Evaluation and Treatment       Row Name 04/05/24 1334          Physical Therapy Time and Intention    Subjective Information complains of;pain  -KJ     Document Type therapy note (daily note)  -KJ     Mode of Treatment physical therapy  -KJ     Patient Effort adequate  -KJ       Row Name 04/05/24 1334          General Information    Existing Precautions/Restrictions fall  cam boot L foot when up  -KJ     Limitations/Impairments safety/cognitive  -KJ       Row Name 04/05/24 1334          Pain    Pretreatment Pain Rating 6/10  -KJ     Posttreatment Pain Rating 6/10  -KJ     Pain Location - abdomen  -KJ       Row Name 04/05/24 1334          Bed Mobility    Comment, (Bed Mobility) up in chair  -KJ       Row Name 04/05/24 1334          Sit-Stand Transfer    Sit-Stand [place] (Transfers) verbal cues;contact guard  -KJ     Assistive Device (Sit-Stand Transfers) walker, front-wheeled  -KJ       Row Name 04/05/24 1334          Stand-Sit Transfer    Stand-Sit [place] (Transfers) verbal cues;contact guard  -KJ     Assistive Device (Stand-Sit Transfers) walker, front-wheeled  -KJ       Row Name 04/05/24 1334          Gait/Stairs (Locomotion)    [place] Level (Gait) verbal cues;contact guard;minimum assist (75% patient effort)  -KJ     Assistive Device (Gait) walker, front-wheeled  -KJ     Distance in Feet (Gait) 35  x 2; due to sore on L foot did  not walk too far  -KJ       Row Name 04/05/24 1334          Aerobic Exercise    Comment, Aerobic Exercise (Therapeutic Exercise) AROM x 15-20 reps  -KJ       Row Name             Wound 06/15/23 0102  Left anterior plantar    Wound - Properties Group Placement Date: 06/15/23  -SM Placement Time: 0102 -SM Side: Left  -SM Orientation: anterior  -SM Location: plantar  -SM    Retired Wound - Properties Group Placement Date: 06/15/23  -SM Placement Time: 0102 -SM Side: Left  -SM Orientation: anterior  -SM Location: plantar  -SM    Retired Wound - Properties Group Date first assessed: 06/15/23  -SM Time first assessed: 0102 -SM Side: Left  -SM Location: plantar  -SM      Row Name             Wound 08/22/23 0140 Left posterior plantar    Wound - Properties Group Placement Date: 08/22/23  -AM Placement Time: 0140  -AM Present on Original Admission: Y  -AM Side: Left  -AM Orientation: posterior  -AM Location: plantar  -AM    Retired Wound - Properties Group Placement Date: 08/22/23  -AM Placement Time: 0140  -AM Present on Original Admission: Y  -AM Side: Left  -AM Orientation: posterior  -AM Location: plantar  -AM    Retired Wound - Properties Group Date first assessed: 08/22/23  -AM Time first assessed: 0140  -AM Present on Original Admission: Y  -AM Side: Left  -AM Location: plantar  -AM      Row Name             Wound Left lateral foot Diabetic Ulcer    Wound - Properties Group Side: Left  -MH Orientation: lateral  -MH Location: foot  -JACIEL, 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;diabetic foot ulcer/gangrene  Primary Wound Type: Diabetic ulc  -JACIEL Wound Outcome: Amputation  -JACIEL Stage, Pressure Injury : other (see comments)  -JACIEL, full thickness starting 10/20/21     Retired Wound - Properties Group Side: Left  -MH Orientation: lateral  -MH Location: foot  -JACIEL, 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;diabetic foot ulcer/gangrene  Primary Wound Type: Diabetic ulc  -JACIEL Stage, Pressure Injury : other (see comments)  -JACIEL, full thickness starting 10/20/21  Wound Outcome: Amputation  -JACIEL    Retired Wound - Properties Group Side: Left  -MH Location: foot  -JACIEL, 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;diabetic foot ulcer/gangrene  Primary Wound Type: Diabetic ulc  -JACIEL Wound  Outcome: Amputation  -JACIEL      Row Name             Wound 04/04/24 2100 Left posterior wrist Skin Tear    Wound - Properties Group Placement Date: 04/04/24  -NR Placement Time: 2100  -NR Present on Original Admission: N  -NR Side: Left  -NR Orientation: posterior  -NR Location: wrist  -NR Primary Wound Type: Skin tear  -NR Additional Comments: likely caused by wristband. Band removed, site cleaned with alcohol swab, wraped in gauze  -NR    Retired Wound - Properties Group Placement Date: 04/04/24  -NR Placement Time: 2100  -NR Present on Original Admission: N  -NR Side: Left  -NR Orientation: posterior  -NR Location: wrist  -NR Primary Wound Type: Skin tear  -NR Additional Comments: likely caused by wristband. Band removed, site cleaned with alcohol swab, wraped in gauze  -NR    Retired Wound - Properties Group Date first assessed: 04/04/24  -NR Time first assessed: 2100 -NR Present on Original Admission: N  -NR Side: Left  -NR Location: wrist  -NR Primary Wound Type: Skin tear  -NR Additional Comments: likely caused by wristband. Band removed, site cleaned with alcohol swab, wraped in gauze  -NR      Row Name 04/05/24 1334          Positioning and Restraints    Pre-Treatment Position sitting in chair/recliner  -KJ     Post Treatment Position chair  -KJ     In Bed call light within reach  pt had sitter in room  -KJ               User Key  (r) = Recorded By, (t) = Taken By, (c) = Cosigned By      Initials Name Provider Type    Claudia Maddox, PTA Physical Therapist Assistant    Yuliana Lisa RN Registered Nurse    MH Haase, Mallory L, RN Registered Nurse    Faviola Kunz RN Registered Nurse    Michelle Diaz RN Registered Nurse    Reuben Johnson RN Registered Nurse                    Physical Therapy Education       Title: PT OT SLP Therapies (In Progress)       Topic: Physical Therapy (In Progress)       Point: Mobility training (Done)       Learning Progress Summary              Patient Acceptance, E,TB,D, VU,NR by  at 3/29/2024 1009    Comment: education re: purpose of PT/importance of activity, safety/falls prevention, NWB L LE, improved tech w/ bed mobility, positioning for pressure relief                         Point: Home exercise program (Not Started)       Learner Progress:  Not documented in this visit.              Point: Precautions (Done)       Learning Progress Summary             Patient Acceptance, E,TB,D, VU,NR by  at 3/29/2024 1009    Comment: education re: purpose of PT/importance of activity, safety/falls prevention, NWB L LE, improved tech w/ bed mobility, positioning for pressure relief                                         User Key       Initials Effective Dates Name Provider Type Discipline     08/02/18 -  Melly Mustafa, PT Physical Therapist PT                  PT Recommendation and Plan     Plan of Care Reviewed With: patient  Progress: improving  Outcome Evaluation: PT tx completed. Pt sitting in chair, seems less interactive today. Mostly nonverbal, at times had to repeat question for him to answer. Reports abdominal pn. MaxA don/doffing cam boot LLE. Transfer sit<>stand CG/Eric, amb short distance with r wx CG/Eric. Decreased distance due to wound on L foot. AROM BLE's in chair x 20 reps. Recommend SNF at discharge due to the need for care, strength, and cognitive level.   Outcome Measures       Row Name 04/05/24 1400 04/04/24 0900 04/03/24 0800       How much help from another person do you currently need...    Turning from your back to your side while in flat bed without using bedrails? 4  -KJ 4  -AH 3  -AH    Moving from lying on back to sitting on the side of a flat bed without bedrails? 4  -KJ 4  -AH 3  -AH    Moving to and from a bed to a chair (including a wheelchair)? 3  -KJ 4  -AH 3  -AH    Standing up from a chair using your arms (e.g., wheelchair, bedside chair)? 3  -KJ 4  -AH 3  -AH    Climbing 3-5 steps with a railing? 3  -KJ 3  -AH 2   -AH    To walk in hospital room? 3  -KJ 3  -AH 3  -AH    AM-PAC 6 Clicks Score (PT) 20  -KJ 22  -AH 17  -AH    Highest Level of Mobility Goal 6 --> Walk 10 steps or more  -KJ 7 --> Walk 25 feet or more  -AH 5 --> Static standing  -AH       Functional Assessment    Outcome Measure Options AM-PAC 6 Clicks Basic Mobility (PT)  -KJ AM-PAC 6 Clicks Basic Mobility (PT)  -AH AM-PAC 6 Clicks Basic Mobility (PT)  -              User Key  (r) = Recorded By, (t) = Taken By, (c) = Cosigned By      Initials Name Provider Type     Chery Rosado, PARUL Physical Therapist Assistant    Claudia Maddox PTA Physical Therapist Assistant                     Time Calculation:    PT Charges       Row Name 04/05/24 1401             Time Calculation    Start Time 1334  -KJ      Stop Time 1401  -KJ      Time Calculation (min) 27 min  -KJ      PT Received On 04/05/24  -KJ      PT Goal Re-Cert Due Date 04/08/24  -KJ         Time Calculation- PT    Total Timed Code Minutes- PT 27 minute(s)  -KJ                User Key  (r) = Recorded By, (t) = Taken By, (c) = Cosigned By      Initials Name Provider Type    Claudia Maddox PTA Physical Therapist Assistant                  Therapy Charges for Today       Code Description Service Date Service Provider Modifiers Qty    64305145935 HC PT THER PROC EA 15 MIN 4/5/2024 Claudia Prakash PTA GP 1    17113014853 HC GAIT TRAINING EA 15 MIN 4/5/2024 Claudia Prakash PTA GP 1            PT G-Codes  Outcome Measure Options: AM-PAC 6 Clicks Basic Mobility (PT)  AM-PAC 6 Clicks Score (PT): 20  AM-PAC 6 Clicks Score (OT): 16    Claudia Prakash PTA  4/5/2024

## 2024-04-05 NOTE — PROGRESS NOTES
Automatic IV to PO Pharmacy Note - Antimicrobial    Erick Luong is a 68 y.o. male who meets the following criteria for IV to PO therapy conversion :     Tolerating oral fluids or 40ml/hour of enteral nutrition and oral route not otherwise compromised  Receiving other oral medications on a scheduled basis  Afebrile (temperature less than 100.4 degrees F) for at least 24 hours  WBC within/decreasing toward normal range    Lab Results   Component Value Date    WBC 6.93 04/05/2024     Temp Readings from Last 1 Encounters:   04/05/24 97.3 °F (36.3 °C)       Assessment/Plan  Based on this criteria, Linezolid 600 mg IV Q12H has been changed to Linezolid 600 mg PO Q12H per the directives and guidelines established by [facility] Pharmacy and Therapeutics Committee and [facility] Medical Executive Committee .       Santana Crum, PharmD  04/05/2415:51 CDT

## 2024-04-05 NOTE — PLAN OF CARE
Goal Outcome Evaluation:      No BM this shift. C/o abdominal pain. Administered morphine x3, oxycodone x1, see MAR for details. Pt slept in chair all night.

## 2024-04-05 NOTE — PAYOR COMM NOTE
"Erick Luong (68 y.o. Male)      [id number]   cont stay   please review clinical for additional days  [facility] phone    fax                   Date of Birth   1955    Social Security Number       Address   [address] [place] [address]    Home Phone   [phone]    MRN   [id number]       Gnosticism   Orthodoxy    Marital Status                               Admission Date   3/26/24    Admission Type   Emergency    Admitting Provider   Latanya Paez MD    Attending Provider   Latanya Paez MD    Department, Room/Bed   [facility] 3C, 372/1       Discharge Date       Discharge Disposition       Discharge Destination                                 Attending Provider: Latanya Paez MD    Allergies: Cefepime, Bactrim [Sulfamethoxazole-trimethoprim], Vancomycin, Zolpidem, Zolpidem Tartrate, Metronidazole    Isolation: Contact   Infection: MRSA (05/19/19), COVID (History) (08/08/22)   Code Status: CPR    Ht: 182.9 cm (72\")   Wt: 133 kg (293 lb 12.8 oz)    Admission Cmt: None   Principal Problem: Sepsis [A41.9]                   Active Insurance as of 3/26/2024       Primary Coverage       Payor Plan Insurance Group Employer/Plan Group    [employer] [facility] EMPLOYEE [id number]       Payor Plan Address Payor Plan Phone Number Payor Plan Fax Number Effective Dates    [address] [phone]  1/1/2022 - None Entered    [facility] [id number]         Subscriber Name Subscriber Birth Date Member ID       ZAINAB LUONG 11/27/1969 [id number]               Secondary Coverage       Payor Plan Insurance Group Employer/Plan Group    MEDICARE MEDICARE A & B        Payor Plan Address Payor Plan Phone Number Payor Plan Fax Number Effective Dates    [address] [phone]  7/1/2013 - None Entered    [facility] [id number]         Subscriber Name Subscriber Birth Date Member ID       ERICK LUONG 1955 [id number]          "            Emergency Contacts        (Rel.) Home Phone Work Phone Mobile Phone    Joan Luong (Spouse) [phone]              Current Facility-Administered Medications   Medication Dose Route Frequency Provider Last Rate Last Admin    acetaminophen (TYLENOL) tablet 650 mg  650 mg Oral Q4H PRN Raquel Duncan, APRN   650 mg at 04/04/24 0002    Or    acetaminophen (TYLENOL) 160 MG/5ML oral solution 650 mg  650 mg Oral Q4H PRN Raquel Duncan, APRN   650 mg at 03/27/24 2109    Or    acetaminophen (TYLENOL) suppository 650 mg  650 mg Rectal Q4H PRN Raquel Duncan, APRN        apixaban (ELIQUIS) tablet 5 mg  5 mg Oral Q12H Raquel Duncan, APRN   5 mg at 04/05/24 0933    ascorbic acid (VITAMIN C) tablet 1,000 mg  1,000 mg Oral Daily Rene Maloney MD   1,000 mg at 04/05/24 0931    aspirin EC tablet 81 mg  81 mg Oral Daily Rene Maloney MD   81 mg at 04/05/24 0933    sennosides-docusate (PERICOLACE) 8.6-50 MG per tablet 1 tablet  1 tablet Oral BID Julia Adrian MD   1 tablet at 04/05/24 0930    And    polyethylene glycol (MIRALAX) packet 17 g  17 g Oral Daily Julia Adrian MD   17 g at 04/05/24 0929    And    bisacodyl (DULCOLAX) EC tablet 5 mg  5 mg Oral Daily PRN Julia Adrian MD        And    bisacodyl (DULCOLAX) suppository 10 mg  10 mg Rectal Daily PRN Julia Adrian MD        [Held by provider] bumetanide (BUMEX) tablet 1 mg  1 mg Oral Daily Stewart Alvarez, APRN   1 mg at 04/04/24 0847    carvedilol (COREG) tablet 3.125 mg  3.125 mg Oral BID With Meals Rene Maloney MD   3.125 mg at 04/05/24 0932    dextrose (D50W) (25 g/50 mL) IV injection 25 g  25 g Intravenous Q15 Min PRN Raquel Duncan APRN        dextrose (GLUTOSE) oral gel 15 g  15 g Oral Q15 Min PRN Raquel Duncan APRN   15 g at 03/27/24 1710    Diclofenac Sodium (VOLTAREN) 1 % gel 2 g  2 g Topical 4x Daily PRN Rene Maloney MD         dicyclomine (BENTYL) capsule 20 mg  20 mg Oral TID PRN Rene Maloney MD   20 mg at 04/02/24 2234    donepezil (ARICEPT) tablet 10 mg  10 mg Oral Daily Raquel Duncan, APRN   10 mg at 04/05/24 0933    famotidine (PEPCID) tablet 20 mg  20 mg Oral Daily Rene Maloney MD   20 mg at 04/05/24 0933    glucagon (GLUCAGEN) injection 1 mg  1 mg Intramuscular Q15 Min PRN Raquel Duncan, APRN        haloperidol lactate (HALDOL) injection 5 mg  5 mg Intravenous Q6H PRN Rene Maloney MD   5 mg at 04/03/24 0026    insulin detemir (LEVEMIR) injection 30 Units  30 Units Subcutaneous Nightly Raquel Duncan, APRN   30 Units at 04/04/24 2107    Insulin Lispro (humaLOG) injection 4-24 Units  4-24 Units Subcutaneous 4x Daily AC & at Bedtime Raquel Duncan, APRN   8 Units at 04/05/24 0929    insulin regular (humuLIN R,novoLIN R) injection 10 Units  10 Units Subcutaneous TID AC Steve De Jesus MD   10 Units at 04/05/24 0929    isosorbide mononitrate (IMDUR) 24 hr tablet 30 mg  30 mg Oral Q24H Rene Maloney MD   30 mg at 04/05/24 0931    lactulose solution 20 g  20 g Oral TID Latanya Paez MD   20 g at 04/05/24 0930    Linezolid (ZYVOX) 600 mg 300 mL  600 mg Intravenous Q12H Julia Adrian  mL/hr at 04/04/24 2331 600 mg at 04/04/24 2331    magnesium hydroxide (MILK OF MAGNESIA) suspension 10 mL  10 mL Oral Daily PRN Rene Maloney MD   10 mL at 04/03/24 0414    melatonin tablet 6 mg  6 mg Oral Nightly Rene Maloney MD   6 mg at 04/04/24 2043    midodrine (PROAMATINE) tablet 2.5 mg  2.5 mg Oral TID AC Rene Maloney MD   2.5 mg at 04/05/24 0932    Morphine sulfate (PF) injection 2 mg  2 mg Intravenous Q3H PRN Rene Maloney MD   2 mg at 04/05/24 0935    mupirocin (BACTROBAN) 2 % nasal ointment 1 Application  1 Application Each Nare BID Nadia Polo, APRN   1 Application at 04/05/24 0931    nitroglycerin (NITROSTAT) SL tablet 0.4 mg  0.4 mg Sublingual Q5 Min  PRN Raquel Duncan, APRN        ondansetron ODT (ZOFRAN-ODT) disintegrating tablet 4 mg  4 mg Oral Q6H PRN Raquel Duncan APRN   4 mg at 04/03/24 0537    Or    ondansetron (ZOFRAN) injection 4 mg  4 mg Intravenous Q6H PRN Raquel Duncan, SUSAN   4 mg at 03/29/24 1837    oxyCODONE (ROXICODONE) immediate release tablet 10 mg  10 mg Oral BID PRN Raquel Duncan APRN   10 mg at 04/04/24 2329    pregabalin (LYRICA) capsule 100 mg  100 mg Oral Nightly Rene Maloney MD   100 mg at 04/04/24 2043    pregabalin (LYRICA) capsule 50 mg  50 mg Oral Daily Rene Maloney MD   50 mg at 04/05/24 0930    rosuvastatin (CRESTOR) tablet 10 mg  10 mg Oral Nightly Rene Maloney MD   10 mg at 04/04/24 2043    sodium chloride 0.9 % flush 10 mL  10 mL Intravenous PRN Steve De Jesus MD        sodium chloride 0.9 % flush 10 mL  10 mL Intravenous Q12H Raquel Duncan APRN   10 mL at 04/05/24 0936    sodium chloride 0.9 % flush 10 mL  10 mL Intravenous PRN Raquel Duncan APRN        sodium chloride 0.9 % infusion 40 mL  40 mL Intravenous PRN Raquel Duncan APRN        sucralfate (CARAFATE) 1 GM/10ML suspension 1 g  1 g Oral TID AC Rene Maloney MD   1 g at 04/05/24 0930    tamsulosin (FLOMAX) 24 hr capsule 0.4 mg  0.4 mg Oral Daily Rene Maloney MD   0.4 mg at 04/05/24 0931    timolol (TIMOPTIC) 0.25 % ophthalmic solution 1 drop  1 drop Both Eyes Q12H Rene Maloney MD   1 drop at 04/05/24 0929    zinc sulfate (ZINCATE) capsule 220 mg  220 mg Oral Daily Rene Maloney MD   220 mg at 04/05/24 0931        Physician Progress Notes (last 24 hours)        Stewart Alvarez, APRN at 04/05/24 0931          Nephrology ([facility]) Progress Note      Patient:  Erick Luong  YOB: 1955  Date of Service: 4/5/2024  MRN: [id number]   Acct: [id number]   Primary Care Physician: Del Shetty MD  Advance Directive:   Code Status and Medical  Interventions:   Ordered at: 03/26/24 1815     Level Of Support Discussed With:    Health Care Surrogate     Code Status (Patient has no pulse and is not breathing):    CPR (Attempt to Resuscitate)     Medical Interventions (Patient has pulse or is breathing):    Full Support     Admit Date: 3/26/2024       Hospital Day: 10  Referring Provider: No ref. provider found      Patient personally seen and examined.  Complete chart including Consults, Notes, Operative Reports, Labs, Cardiology, and Radiology studies reviewed as able.        Subjective:  Erick Luong is a 68 y.o. male for whom we were consulted for evaluation and treatment of acute kidney injury.  He has stage IIIb chronic kidney disease baseline, follows with Dr Lomas in our office.  Baseline creatinine approximately 1.8. He has history of insulin-dependent type 2 diabetes, hypertension, abdominal obesity, obstructive sleep apnea, diabetic foot ulcer and coronary artery disease.   Patient presented to ER on 3/26 with altered mental status. Had debridement of ongoing wound on left foot the day prior. Left foot warm and erythremic on arrival to ER. Noted to have elevated blood glucose over 400. Initial creatinine of 1.9.  Admitted to medical floor for sepsis due to left foot wound. Patient has been agitated and confused during the admission, requiring wrist restraints due to pulling at lines and tubes. Renal function and mentation have been improving. Now has a sitter present in room due to repeatedly getting up unattended    Today is awake and responding appropriately to questions. However he has an extremely flat affect and his answers are mainly yes or no without elaboration.  He does mention that his back hurts but no other complaints. Urine output nonoliguric     Allergies:  Cefepime, Bactrim [sulfamethoxazole-trimethoprim], Vancomycin, Zolpidem, Zolpidem tartrate, and Metronidazole    Home Meds:  Medications Prior to Admission   Medication Sig Dispense  Refill Last Dose    amitriptyline (ELAVIL) 25 MG tablet Take 2 tablets by mouth Daily at bedtime. (Patient not taking: Reported on 3/27/2024) 60 tablet 1 Not Taking    apixaban (ELIQUIS) 5 MG tablet tablet Take 1 tablet by mouth Every 12 (Twelve) Hours.       ascorbic acid (VITAMIN C) 1000 MG tablet Take 1 tablet by mouth Daily. 30 tablet 3     Aspirin 81 MG capsule Take 81 mg by mouth Daily.       bumetanide (BUMEX) 1 MG tablet Take 1 tablet every day by oral route for 30 days. 30 tablet 0     bumetanide (BUMEX) 2 MG tablet Take 1 tablet by mouth Daily. Take 2 tablets in morning;1 tablet at night (Patient not taking: Reported on 3/27/2024)   Not Taking    busPIRone (BUSPAR) 10 MG tablet Take 1 tablet by mouth 3 (Three) Times a Day.       calcitriol (ROCALTROL) 0.5 MCG capsule Take 1 capsule by mouth Daily. 90 capsule 4     calcitriol (ROCALTROL) 0.5 MCG capsule Take 1 capsule every day by oral route for 90 days. (Patient not taking: Reported on 3/27/2024) 90 capsule 4 Not Taking    carvedilol (COREG) 3.125 MG tablet Take 1 tablet twice a day by oral route. 60 tablet 4     carvedilol (COREG) 6.25 MG tablet Take 1 tablet by mouth 2 (Two) Times a Day. (Patient not taking: Reported on 3/27/2024) 180 tablet 4 Not Taking    Cholecalciferol (D-5000) 125 MCG (5000 UT) tablet Take 1 tablet by mouth Daily Before Lunch. 30 tablet 2     cloNIDine (CATAPRES) 0.1 MG tablet Take 1 tablet by mouth Every 12 (Twelve) Hours. (Patient not taking: Reported on 3/27/2024) 60 tablet 2 Not Taking    Diclofenac Sodium (VOLTAREN) 1 % gel gel Apply 2 g topically to the appropriate area as directed 4 (Four) Times a Day As Needed. 300 g 11     Diclofenac Sodium (VOLTAREN) 1 % gel gel Apply 2 g topically to the appropriate area as directed 4 (Four) Times a Day. (Patient not taking: Reported on 3/27/2024) 300 g 11 Not Taking    donepezil (ARICEPT) 10 MG tablet Take 1 tablet by mouth Daily. (Patient not taking: Reported on 3/27/2024) 90 tablet 4  Not Taking    Dulaglutide (Trulicity) 4.5 MG/0.5ML solution pen-injector Inject 0.5 mL under the skin into the appropriate area as directed 1 (One) Time Per Week. (Patient not taking: Reported on 3/27/2024) 2 mL 11 Not Taking    DULoxetine (CYMBALTA) 60 MG capsule Take 1 capsule by mouth Daily. 90 capsule 4     DULoxetine (CYMBALTA) 60 MG capsule Take 1 capsule by mouth Daily. (Patient not taking: Reported on 3/27/2024) 90 capsule 4 Not Taking    DULoxetine (CYMBALTA) 60 MG capsule Take 1 capsule by mouth Daily. (Patient not taking: Reported on 3/27/2024) 90 capsule 4 Not Taking    empagliflozin (JARDIANCE) 25 MG tablet tablet Take 1 tablet by mouth Daily. (Patient not taking: Reported on 3/27/2024) 30 tablet 2 Not Taking    famotidine (PEPCID) 20 MG tablet Take 1 tablet twice a day by oral route. 180 tablet 4     fluticasone (FLONASE) 50 MCG/ACT nasal spray 1 spray into the nostril(s) as directed by provider Daily. (Patient taking differently: 1 spray into the nostril(s) as directed by provider 2 (Two) Times a Day.) 16 g 1     HYDROcodone-acetaminophen (NORCO) 7.5-325 MG per tablet Take 1 tablet by mouth 2 (Two) Times a Day As Needed. (Patient not taking: Reported on 3/27/2024) 40 tablet 0 Not Taking    Insulin Glargine (Lantus SoloStar) 100 UNIT/ML injection pen Inject 20 Units under the skin into the appropriate area as directed Every Evening. 15 mL 3     Insulin Regular Human, Conc, (HumuLIN R U-500 KwikPen) 500 UNIT/ML solution pen-injector CONCENTRATED injection Inject 120 Units under the skin into the appropriate area as directed 2 (Two) Times a Day with breakfast and dinner. (Patient not taking: Reported on 3/27/2024) 18 mL 5 Not Taking    Insulin Regular Human, Conc, (HumuLIN R U-500 KwikPen) 500 UNIT/ML solution pen-injector CONCENTRATED injection Inject 40 Units under the skin into the appropriate area with regular meals AND 40 Units with large meals. 54 mL 3     isosorbide mononitrate (IMDUR) 30 MG 24 hr  tablet Take 1 tablet by mouth Daily.       magnesium hydroxide (Milk of Magnesia) 400 MG/5ML suspension Take 30 mL every day by oral route for 30 days. 900 mL 0     magnesium hydroxide (Milk of Magnesia) 400 MG/5ML suspension Take 30mL by mouth once daily for 30 days. (Patient not taking: Reported on 3/27/2024) 900 mL 0 Not Taking    melatonin 3 MG tablet Take 1 tablet by mouth At Night As Needed for Sleep.       methylcellulose (Citrucel) oral powder Mix 5g as directed and drink twice daily for 90 days. 900 g 10     metoclopramide (REGLAN) 5 MG tablet Take 1 tablet by mouth 3 times a day.       midodrine (PROAMATINE) 2.5 MG tablet Take 1 tablet by mouth 3 (Three) Times a Day Before Meals.       nitroglycerin (NITROSTAT) 0.4 MG SL tablet Dissolve 1 tablet by mouth every 5 minutes as needed for chest pain; MAX 3 tabs/24 hours.  If no improvement after 3 tabs go to ER 25 tablet 0     ondansetron (ZOFRAN) 4 MG tablet Take 1 tablet by mouth Every 6 (Six) Hours As Needed for Nausea or Vomiting.       oxyCODONE (ROXICODONE) 10 MG tablet Take 1 tablet by mouth twice a day as needed 60 tablet 0     pantoprazole (Protonix) 40 MG EC tablet Take 1 tablet by mouth 2 (Two) Times a Day. (Patient not taking: Reported on 3/27/2024) 180 tablet 4 Not Taking    polyethylene glycol (MIRALAX) 17 g packet Take 17 g by mouth Daily. Obtain OTC       prazosin (MINIPRESS) 1 MG capsule Take 1 capsule by mouth every night at bedtime. 30 capsule 2     pregabalin (LYRICA) 100 MG capsule Take 2 capsules by mouth Every Night.       pregabalin (LYRICA) 50 MG capsule Take 1 capsule by mouth Daily.       rosuvastatin (CRESTOR) 10 MG tablet Take 1 tablet by mouth Daily.       sennosides-docusate (PERICOLACE) 8.6-50 MG per tablet Take 1 tablet by mouth Every Night. Obtain OTC       sennosides-docusate (PERICOLACE) 8.6-50 MG per tablet Take 1 tablet by mouth every night at bedtime. (Patient not taking: Reported on 3/27/2024) 30 tablet 2 Not Taking     sodium hypochlorite (DAKIN'S 1/4 STRENGTH) 0.125 % solution topical solution 0.125% Apply to affected area twice daily (Patient not taking: Reported on 3/27/2024) 473 mL 3 Not Taking    sodium hypochlorite (DAKIN'S 1/4 STRENGTH) 0.125 % solution topical solution 0.125% Apply to affected area twice daily as directed (Patient not taking: Reported on 3/27/2024) 473 mL 5 Not Taking    sodium hypochlorite (DAKIN'S) 0.25 % topical solution Use to wound daily as instructed 473 mL 1     sucralfate (CARAFATE) 1 g tablet Take 1 tablet by mouth 3 times a day.       tamsulosin (FLOMAX) 0.4 MG capsule 24 hr capsule Take 1 capsule by mouth Daily. 90 capsule 1     timolol (TIMOPTIC) 0.5 % ophthalmic solution Administer 1 drop to both eyes 2 (Two) Times a Day.       valsartan (DIOVAN) 40 MG tablet Take 1 tablet by mouth Daily.       zinc sulfate (ZINCATE) 50 MG capsule Take 1 capsule by mouth Daily.          Medicines:  Current Facility-Administered Medications   Medication Dose Route Frequency Provider Last Rate Last Admin    acetaminophen (TYLENOL) tablet 650 mg  650 mg Oral Q4H PRN Raquel Duncan APRN   650 mg at 04/04/24 0002    Or    acetaminophen (TYLENOL) 160 MG/5ML oral solution 650 mg  650 mg Oral Q4H PRN Raquel Duncan, APRN   650 mg at 03/27/24 2109    Or    acetaminophen (TYLENOL) suppository 650 mg  650 mg Rectal Q4H PRN Raquel Duncan, APRN        apixaban (ELIQUIS) tablet 5 mg  5 mg Oral Q12H Raquel Duncan APRN   5 mg at 04/04/24 2043    ascorbic acid (VITAMIN C) tablet 1,000 mg  1,000 mg Oral Daily Rene Maloney MD   1,000 mg at 04/04/24 0849    aspirin EC tablet 81 mg  81 mg Oral Daily Rene Maloney MD   81 mg at 04/04/24 0849    sennosides-docusate (PERICOLACE) 8.6-50 MG per tablet 1 tablet  1 tablet Oral BID Julia Adrian MD   1 tablet at 04/04/24 2043    And    polyethylene glycol (MIRALAX) packet 17 g  17 g Oral Daily Julia Adrian MD   17 g at 04/04/24 1222    And     bisacodyl (DULCOLAX) EC tablet 5 mg  5 mg Oral Daily PRN Julia Adrian MD        And    bisacodyl (DULCOLAX) suppository 10 mg  10 mg Rectal Daily PRN Julia Adrian MD        bumetanide (BUMEX) tablet 1 mg  1 mg Oral Daily Stewart Alvarez APRN   1 mg at 04/04/24 0847    carvedilol (COREG) tablet 3.125 mg  3.125 mg Oral BID With Meals Rene Maloney MD   3.125 mg at 04/04/24 1742    dextrose (D50W) (25 g/50 mL) IV injection 25 g  25 g Intravenous Q15 Min PRN Raquel Duncan APRN        dextrose (GLUTOSE) oral gel 15 g  15 g Oral Q15 Min PRN Raquel Duncan APRN   15 g at 03/27/24 1710    Diclofenac Sodium (VOLTAREN) 1 % gel 2 g  2 g Topical 4x Daily PRN Rene Maloney MD        dicyclomine (BENTYL) capsule 20 mg  20 mg Oral TID PRN Rene Maloney MD   20 mg at 04/02/24 2234    donepezil (ARICEPT) tablet 10 mg  10 mg Oral Daily Raquel Duncan APRN   10 mg at 04/04/24 0849    famotidine (PEPCID) tablet 20 mg  20 mg Oral Daily Rene Maloney MD   20 mg at 04/04/24 0847    glucagon (GLUCAGEN) injection 1 mg  1 mg Intramuscular Q15 Min PRN Raquel Duncan APRN        haloperidol lactate (HALDOL) injection 5 mg  5 mg Intravenous Q6H PRN Rene Maloney MD   5 mg at 04/03/24 0026    insulin detemir (LEVEMIR) injection 30 Units  30 Units Subcutaneous Nightly Raquel Duncan APRN   30 Units at 04/04/24 2107    Insulin Lispro (humaLOG) injection 4-24 Units  4-24 Units Subcutaneous 4x Daily AC & at Bedtime Raquel Duncan APRN   8 Units at 04/04/24 1222    insulin regular (humuLIN R,novoLIN R) injection 10 Units  10 Units Subcutaneous TID AC Steve De Jesus MD   10 Units at 04/04/24 1743    isosorbide mononitrate (IMDUR) 24 hr tablet 30 mg  30 mg Oral Q24H Rene Maloney MD   30 mg at 04/04/24 0848    lactulose solution 20 g  20 g Oral TID Latanya Paez MD   20 g at 04/04/24 2043    Linezolid (ZYVOX) 600 mg 300 mL  600 mg Intravenous Q12H  Julia Adrian  mL/hr at 04/04/24 2331 600 mg at 04/04/24 2331    magnesium hydroxide (MILK OF MAGNESIA) suspension 10 mL  10 mL Oral Daily PRN Rene Maloney MD   10 mL at 04/03/24 0414    melatonin tablet 6 mg  6 mg Oral Nightly Rene Maloney MD   6 mg at 04/04/24 2043    midodrine (PROAMATINE) tablet 2.5 mg  2.5 mg Oral TID AC Rene Maloney MD   2.5 mg at 04/04/24 1742    Morphine sulfate (PF) injection 2 mg  2 mg Intravenous Q3H PRN Rene Maloney MD   2 mg at 04/05/24 0603    mupirocin (BACTROBAN) 2 % nasal ointment 1 Application  1 Application Each Nare BID Nadia Polo, APRN   1 Application at 04/04/24 2043    nitroglycerin (NITROSTAT) SL tablet 0.4 mg  0.4 mg Sublingual Q5 Min PRN Raquel Duncan, APRN        ondansetron ODT (ZOFRAN-ODT) disintegrating tablet 4 mg  4 mg Oral Q6H PRN Raquel Duncan, APRN   4 mg at 04/03/24 0537    Or    ondansetron (ZOFRAN) injection 4 mg  4 mg Intravenous Q6H PRN Raquel Duncan, APRN   4 mg at 03/29/24 1837    oxyCODONE (ROXICODONE) immediate release tablet 10 mg  10 mg Oral BID PRN Raquel Duncan, APRN   10 mg at 04/04/24 2329    pregabalin (LYRICA) capsule 100 mg  100 mg Oral Nightly Rene Maloney MD   100 mg at 04/04/24 2043    pregabalin (LYRICA) capsule 50 mg  50 mg Oral Daily Rene Maloney MD   50 mg at 04/04/24 0849    rosuvastatin (CRESTOR) tablet 10 mg  10 mg Oral Nightly Rene Maloney MD   10 mg at 04/04/24 2043    sodium chloride 0.9 % flush 10 mL  10 mL Intravenous PRN Steve De Jesus MD        sodium chloride 0.9 % flush 10 mL  10 mL Intravenous Q12H Raquel Duncan APRN   10 mL at 04/04/24 2039    sodium chloride 0.9 % flush 10 mL  10 mL Intravenous PRN Raquel Duncan APRN        sodium chloride 0.9 % infusion 40 mL  40 mL Intravenous PRN Raquel Duncan, APRN        sucralfate (CARAFATE) 1 GM/10ML suspension 1 g  1 g Oral TID AC Rene Maloney MD   1 g at 04/04/24 5329     tamsulosin (FLOMAX) 24 hr capsule 0.4 mg  0.4 mg Oral Daily Rene Maloney MD   0.4 mg at 04/04/24 0849    timolol (TIMOPTIC) 0.25 % ophthalmic solution 1 drop  1 drop Both Eyes Q12H Rene Maloney MD   1 drop at 04/04/24 2047    zinc sulfate (ZINCATE) capsule 220 mg  220 mg Oral Daily Rene Maloney MD   220 mg at 04/04/24 0849       Past Medical History:  Past Medical History:   Diagnosis Date    Arthritis     Autonomic disease     CAD (coronary artery disease) 02/06/2017    Cervical radiculopathy 09/16/2021    Chronic constipation with acute exaccerbation 05/10/2021    Coronary artery disease     Degeneration of cervical intervertebral disc 08/11/2021    Diabetes mellitus     Diabetic foot ulcer 08/31/2020    Diabetic polyneuropathy associated with type 2 diabetes mellitus 01/18/2021    Elevated cholesterol     Gastroesophageal reflux disease 05/13/2019    Headache     HTN (hypertension), benign 05/03/2017    Hyperlipidemia     Hypertension     Mixed hyperlipidemia 02/07/2017    Multiple lung nodules 01/26/2020    5mm, 9 mm RLL identified 1/2020, not present 10/2019.    Myocardial infarction     Osteomyelitis 01/22/2020    Osteomyelitis of fifth toe of right foot 10/07/2019    Pancreatitis     Persistent insomnia 01/20/2020    Renal disorder     Sleep apnea 02/06/2017    Sleep apnea with use of continuous positive airway pressure (CPAP)     NON-COMPLIANT    Slow transit constipation 01/16/2019    Spinal stenosis in cervical region 09/16/2021    Vitamin D deficiency 03/02/2021       Past Surgical History:  Past Surgical History:   Procedure Laterality Date    ABDOMINAL SURGERY      AMPUTATION FOOT / TOE Left 10/2021    5th digit     ANTERIOR CERVICAL DISCECTOMY W/ FUSION N/A 8/5/2022    Procedure: CERVICAL DISCECTOMY ANTERIOR WITH FUSION C5-6 with possible plating of C5-7 with neuromonitoring and 1 c-arm;  Surgeon: Karel Soliz MD;  Location: [facility];  Service: Neurosurgery;  Laterality:  N/A;    APPENDECTOMY      BACK SURGERY      CARDIAC CATHETERIZATION Left 2021    Procedure: Left Heart Cath w poss intervention left anatomical snuff box acess;  Surgeon: Omkar Charles DO;  Location:  [facility];  Service: Cardiology;  Laterality: Left;    CARDIAC SURGERY      CATARACT EXTRACTION      CERVICAL SPINE SURGERY      COLONOSCOPY N/A 2017    Normal exam repeat in 5 years    COLONOSCOPY N/A 2019    Mild acute inflammation    COLONOSCOPY W/ POLYPECTOMY  2014    Hyperplastic polyp    CORONARY ARTERY BYPASS GRAFT  10/2015    ENDOSCOPY  2011    Gastritis with hemorrhage    ENDOSCOPY N/A 2017    Normal exam    ENDOSCOPY N/A 2019    Gastritis    ENDOSCOPY N/A 2020    Non-erosive gastritis with hemorrhage    ENDOSCOPY N/A 02/10/2021    Esophagitis    FOOT SURGERY Left     INCISION AND DRAINAGE OF WOUND Left 2007    spider bite       Family History  Family History   Problem Relation Age of Onset    Colon cancer Father     Heart disease Father     Colon cancer Sister     Colon polyps Sister     Alzheimer's disease Mother     Coronary artery disease Sister     Coronary artery disease Sister        Social History  Social History     Socioeconomic History    Marital status:    Tobacco Use    Smoking status: Former     Current packs/day: 0.00     Types: Cigarettes     Quit date:      Years since quittin.2    Smokeless tobacco: Never    Tobacco comments:     [place]   Vaping Use    Vaping status: Never Used   Substance and Sexual Activity    Alcohol use: No    Drug use: No    Sexual activity: Defer       Review of Systems:  History obtained from chart review and the patient  General ROS: No fever or chills  Respiratory ROS: No cough, shortness of breath, wheezing  Cardiovascular ROS: No chest pain or palpitations  Gastrointestinal ROS: No abdominal pain or melena  Genito-Urinary ROS: No dysuria or  hematuria  Psych ROS: No anxiety and depression  14 point ROS reviewed with the patient and negative except as noted above and in the HPI unless unable to obtain.    Objective:  Patient Vitals for the past 24 hrs:   BP Temp Temp src Pulse Resp SpO2   04/05/24 0801 139/65 97.3 °F (36.3 °C) Oral 72 16 100 %   04/05/24 0323 151/71 98.1 °F (36.7 °C) Oral 70 18 --   04/04/24 2307 138/59 97.8 °F (36.6 °C) Oral 79 18 98 %   04/04/24 1955 132/56 98.1 °F (36.7 °C) Oral 65 16 100 %   04/04/24 1742 135/68 -- -- 69 -- --   04/04/24 1606 135/68 97.8 °F (36.6 °C) Oral 69 16 100 %   04/04/24 1159 129/75 97.5 °F (36.4 °C) Oral 74 16 100 %       Intake/Output Summary (Last 24 hours) at 4/5/2024 0931  Last data filed at 4/5/2024 0047  Gross per 24 hour   Intake 960 ml   Output 226 ml   Net 734 ml     General: awake/alert   Chest:  clear to auscultation bilaterally without respiratory distress  CVS: regular rate and rhythm  Abdominal: soft, nontender, positive bowel sounds  Extremities:  bilateral 1+ edema  Skin: warm and dry without rash      Labs:  Results from last 7 days   Lab Units 04/05/24  0512 04/04/24  0514 04/03/24  0334   WBC 10*3/mm3 6.93 5.64 6.54   HEMOGLOBIN g/dL 10.9* 10.0* 9.0*   HEMATOCRIT % 34.5* 31.3* 28.9*   PLATELETS 10*3/mm3 321 251 197         Results from last 7 days   Lab Units 04/05/24  0512 04/04/24  0514 04/03/24  0334 04/01/24  0402 03/31/24  0526 03/30/24  0620   SODIUM mmol/L 139 139 139   < > 136 135*   POTASSIUM mmol/L 4.4 4.6 4.2   < > 3.6 3.5   CHLORIDE mmol/L 102 102 104   < > 101 100   CO2 mmol/L 25.0 26.0 25.0   < > 23.0 24.0   BUN mg/dL 31* 32* 37*   < > 51* 51*   CREATININE mg/dL 1.98* 1.78* 1.76*   < > 2.52* 2.74*   CALCIUM mg/dL 11.1* 10.5 9.3   < > 8.6 8.2*   EGFR mL/min/1.73 36.3* 41.3* 41.9*   < > 27.2* 24.6*   BILIRUBIN mg/dL  --   --   --   --  0.5 0.5   ALK PHOS U/L  --   --   --   --  110 95   ALT (SGPT) U/L  --   --   --   --  19 19   AST (SGOT) U/L  --   --   --   --  30 32   GLUCOSE  mg/dL 173* 185* 118*   < > 201* 104*    < > = values in this interval not displayed.       Radiology:   Imaging Results (Last 72 Hours)       Procedure Component Value Units Date/Time    XR Abdomen KUB [[id number]] Collected: 04/03/24 1345     Updated: 04/03/24 1349    Narrative:      EXAMINATION: XR ABDOMEN KUB- 4/3/2024 1:45 PM     HISTORY: abdominal distention; E11.628-Type 2 diabetes mellitus with  other skin complications; L08.9-Local infection of the skin and  subcutaneous tissue, unspecified; E11.65-Type 2 diabetes mellitus with  hyperglycemia; Z74.09-Other reduced mobility.     REPORT: Supine imaging of the abdomen was performed.     COMPARISON: KUB 10/7/2023.     A large amount of stool seen within the transverse colon and flexures,  likely representing constipation. No evidence of bowel obstruction is  identified. There are no definite urinary tract calculi. Cholecystectomy  clips are present. No acute osseous abnormality is identified.       Impression:      Extensive constipation within the transverse colon and  flexures.     This report was signed and finalized on 4/3/2024 1:46 PM by Dr. Percy Cesar MD.               Culture:  Blood Culture   Date Value Ref Range Status   03/26/2024 Staphylococcus aureus, MRSA (C)  Final     Comment:       Infectious disease consultation is highly recommended to rule out distant foci of infection.  Methicillin resistant Staphylococcus aureus, Patient may be an isolation risk.   03/26/2024 Staphylococcus aureus, MRSA (C)  Final     Comment:       Infectious disease consultation is highly recommended to rule out distant foci of infection.  Methicillin resistant Staphylococcus aureus, Patient may be an isolation risk.         Assessment    Acute kidney injury, ATN--resolving  Baseline chronic kidney disease stage 3b  Type 2 diabetes, poorly controlled (A1c 10.8%)  Hypertension   Metabolic encephalopathy--improved  Anemia of CKD  Hypokalemia--improved  Sepsis due to  infection of left foot wound  Hypercalcemia     Plan:  Renal function a little worse today. Will hold Bumex today, possibly can resume tomorrow  Stop Vitamin D3 and calcitriol  Check serum Vitamin D level, PTH      SUSAN Calvert  4/5/2024  09:31 CDT      Electronically signed by Stewart Alvarez APRN at 04/05/24 0941           4/5 PRN MED   morphine iv x3

## 2024-04-05 NOTE — PROGRESS NOTES
Adult Nutrition  Assessment/PES    Patient Name:  Erick Luong  YOB: 1955  MRN: [id number]  Admit Date:  3/26/2024    Assessment Date:  4/5/2024     Reason for Assessment       Row Name 04/05/24 1205          Reason for Assessment    Reason For Assessment follow-up protocol                    Nutrition/Diet History       Row Name 04/05/24 1214          Nutrition/Diet History    Typical Intake (Food/Fluid/EN/PN) NTN follow-up. Visited pt at bedside with sitter. Pt only responding with one word answers. When asked if pt appetite is good pt said no. Sitter stated he consumed  some sausage and eggs this morning. Per intake report, 1-day intake average 58.33% over 3 meals. Continue to encourage intake. Pt stated he not consuming ONS. Offered  alternative, pt refused. Continuing ONS, continue to encourage consumption. Will continue follow per protocol.     Factors Affecting Nutritional Intake appetite                    Labs/Tests/Procedures/Meds       Row Name 04/05/24 1206          Labs/Procedures/Meds    Lab Results Reviewed reviewed, pertinent     Lab Results Comments BUN 31, Cr 1.98, GFR 36.3, Glu 239, , H/H        Diagnostic Tests/Procedures    Diagnostic Test/Procedure Reviewed reviewed        Medications    Pertinent Medications Reviewed reviewed     Pertinent Medications Comments see MAR                    Physical Findings       Row Name 04/05/24 1206          Physical Findings    Overall Physical Appearance Room air. Last bm 4/4. Edema. Wound-left posterior plantar. WOund-left anterior plantar. Wound-left lateral foot diabetic ulcer.     Additional Documentation Fluid Accumulation (Group)        Fluid Accumulation    Location (Edema) generalized     Generalized (Edema) 2-->mild                    Estimated/Assessed Needs - Anthropometrics       Row Name 04/05/24 1207          Anthropometrics    Weight for Calculation 132 kg (290 lb 5.5 oz)        Estimated/Assessed Needs    Additional  Documentation Fluid Requirements (Group);Protein Requirements (Group);KCAL/KG (Group)        KCAL/KG    KCAL/KG 15 Kcal/Kg (kcal);14 Kcal/Kg (kcal)     14 Kcal/Kg (kcal) 1843.8     15 Kcal/Kg (kcal) 1975.5        Protein Requirements    Weight Used For Protein Calculations 80.7 kg (178 lb)  IBW     Est Protein Requirement Amount (gms/kg) 0.8 gm protein     Estimated Protein Requirements (gms/day) 64.59        Fluid Requirements    Fluid Requirements (mL/day) 1976     Estimated Fluid Requirement Method other (see comments)  1 mL/kcal     RDA Method (mL) 1976                    Nutrition Prescription Ordered       Row Name 04/05/24 1208          Nutrition Prescription PO    Current PO Diet Regular     Fluid Consistency Thin     Supplement Boost Glucose Control (Glucerna Shake)     Supplement Frequency 3 times a day     Common Modifiers Cardiac;Consistent Carbohydrate                    Evaluation of Received Nutrient/Fluid Intake       Row Name 04/05/24 1208          Nutrient/Fluid Evaluation    Number of Days Evaluated 3 days     Additional Documentation Fluid Intake Evaluation (Group)        Fluid Intake Evaluation    Oral Fluid (mL) 400        PO Evaluation    Number of Days PO Intake Evaluated 1 day     Number of Meals 3     % PO Intake 58.33                       Problem/Interventions:   Problem 1       Row Name 04/05/24 1215          Nutrition Diagnoses Problem 1    Problem 1 Inadequate Nutrient Intake     Etiology (related to) Factors Affecting Nutrition     Reported/Observed By RD/Tech     Mental State/Condition Discomfort     Signs/Symptoms (evidenced by) Report of Mnimal PO Intake;PO Intake;Report/Observation     Percent (%) intake recorded 58 %     Over number of meals 3     Appetite Poor at this time                          Intervention Goal       Row Name 04/05/24 1215          Intervention Goal    General Reduce/improve symptoms;Meet nutritional needs for age/condition;Disease management/therapy     PO  Increase intake;Tolerate PO     Weight Appropriate weight loss                    Nutrition Intervention       Row Name 04/05/24 1215          Nutrition Intervention    RD/Tech Action Care plan reviewd;Follow Tx progress                      Education/Evaluation       Row Name 04/05/24 1215          Education    Education No discharge needs identified at this time        Monitor/Evaluation    Monitor Per protocol                     Electronically signed by:  Igor Schroeder RD  04/05/24 12:15 CDT

## 2024-04-05 NOTE — PLAN OF CARE
Goal Outcome Evaluation:  Plan of Care Reviewed With: patient        Progress: no change  Outcome Evaluation: OT tx completed. Pt in fowlers upon therapist arrival; A&Ox3; c/o 8/10 pain in lower abdomen; sitter present. Pt demo'd increased difficulty following commands on this date. Pt performed supine>sit utilizing bedrail with HOB elevated with SBA. Pt required Max A to dong LLE CAM boot while seated EOB. Pt performed sit>stand requiring Min A and verbal cues to push from the bed this date. Pt ambulated from bed<>BR utilizing rwx with CGA. Pt performed oral hygiene while standing with single UE support at sink with CGA; Pt required intermittent verbal cues for sequencing. Pt continues to benefit from skilled OT intervention in order to address deficits in fxl mobility, fxl activity tolerance, balance, strength, and use of adaptive techniques/equipment during performance of BADLs. Recommend SNF at discharge.      Anticipated Discharge Disposition (OT): skilled nursing facility

## 2024-04-05 NOTE — PROGRESS NOTES
[facility] Medicine Services  INPATIENT PROGRESS NOTE    Patient Name: Erick Luong  Date of Admission: 3/26/2024  Today's Date: 04/05/24  Length of Stay: 10  Primary Care Physician: Del Shetty MD    Subjective   Chief Complaint: Altered mental status  Erick Luong is a 68-year-old male with a past medical history of coronary artery disease, diastolic dysfunction followed by Dr. Garay, vascular disease, GERD, diabetes, chronic anemia, chronic nonhealing wound to the left foot followed by [facility] wound care.  Patient had an extended hospitalization 8/2023 due to syncope, subsequent admission to [facility] 9/18 - 10/11, 2023.  During that admission he did undergo debridement of Proteus wound infection.     Patient seen by PCP on 3/11/2024, started on prazosin 1 mg daily, duloxetine 60 mg daily magnesium hydroxide 400 mg daily per office note.  Wife is unaware of exactly what patient is taking.  Will need pharmacy to obtain correct med list.     [facility] who performed debridement on the plantar wound treating with Santyl, is also a area on dorsal aspect of the foot.  Foot is erythemic, warm to touch.  Doctor told patient and wife he was doing well.  There was no signs of infection yesterday.  Today patient was brought to emergency department via EMS for altered mental status with disorientation, glucose was noted to be over 500 in the ambulance.  Initial glucose here 400 followed with 438 treated with regular insulin.  I have taken care of this patient many times.  He awakens for questions.  He is somewhat somnolent.  Remainder workup reveals creatinine 1.9 at baseline, CRP 4.3, lactic acid 3.2 and procalcitonin 6.56.  He does have a white count of 14.6, no bandemia.  Initially afebrile however now 100.7, patient meets criteria for sepsis with tachycardia and tachypnea, elevated lactic acid and source of infection.   There is no organ failure.  Patient is admitted for simple sepsis, condition stabl  3/31   Patient is alert and oriented x 3.  He appears to be in good spirits.  He is requesting for discharge.  He was counseled that we will need to wait until later in this week, after we set up IV antibiotics before we can discharge him.    4/1  Patient was admitted to ER on 3/26 with altered mental status. Had debridement of ongoing wound on left foot the day prior. Left foot warm and erythremic on arrival to ER. Noted to have elevated blood glucose over 400. Initial creatinine of 1.9.  [facility] nephrology were consulted for acute on chronic renal failure MRI of the left foot did not show any osteomyelitis or abscess.  [facility] ID patient was found to have MRSA bacteremia-blood cultures + March 26 and March 29.  Secondary to infected left foot with wounds and associated cellulitis on admission patient was started on Zyvox plan is for echo today rule out endocarditis given persistent bacteremia holding off on PICC line pending that   4/2  As before history of coronary disease diabetes chronic diabetic foot ulcer noncompliance with A1c 10.4 presented to us with mental status change was found to have MRSA sepsis bacteremia secondary to his infected foot MRI did not show any osteo or abscess has been on Zyvox echo was ordered to rule out endocarditis for persistent bacteremia, podiatry recommends wound care and follow-up with outpatient wound care appreciate ID Currently plan for at least 2 weeks of linezolid with start date after clearance of blood cultures-currently March 30. Should be able to transition to oral linezolid given increased bioavailability. Duration may change based on echocardiogram findings , [facility] nephrology acute on chronic renal failure secondary to ATN resolving  4/3  Remains on Zyvox blood cultures remains negative echo of the heart could not rule out endocarditis poor quality recommending YANIV if  endocarditis is a concern we will leave this up to the infectious disease attending appreciate nephrology renal function is getting better, per podiatry continue wound care continue antibiotics MRI is showing no evidence of soft tissue abscess or osteomyelitis podiatry will see as outpatient, patient A1c is 10.8 needs much better control of his diabetes and much better follow-up with his family doctor regarding his diabetes, and is having stomach distention and bloating last bowel movement was 3 days ago will do KUB and start him on lactulose  4/4  As above ATN is resolving creatinine down to 1.78, reassuring extensive constipation to account for his abdominal pain was started on lactulose and will go ahead and start him on enemas, appreciate ID continue Zyvox I agree with ID given the lack of persistent bacteremia no need for YANIV continue wound care repeat blood culture remains unremarkable from March 30, awaiting SNF, patient said that he did not have any more bowel movements we will go ahead and start him on enemas awaiting SNF  4/5  Appreciate nephrology will hold Bumex today kidney function is slightly worse appreciate ID remains on Zyvox for MRSA bacteremia no need for YANIV continue wound care family still wants to take him home with home health and not interested in rehab but culture from March 30 and February 1 remains unremarkable, and has a sitter last night  Review of Systems   All pertinent negatives and positives are as above. All other systems have been reviewed and are negative unless otherwise stated.     Objective    Temp:  [97.3 °F (36.3 °C)-98.1 °F (36.7 °C)] 97.3 °F (36.3 °C)  Heart Rate:  [65-79] 72  Resp:  [16-18] 16  BP: (129-151)/(56-75) 139/65  Physical Exam  Constitutional:       General: He is not in acute distress.     Appearance: He is well-developed. He is not diaphoretic.   HENT:      Head: Normocephalic.   Eyes:      General: No scleral icterus.     Conjunctiva/sclera: Conjunctivae  normal.      Pupils: Pupils are equal, round, and reactive to light.   Neck:      Thyroid: No thyromegaly.      Vascular: No JVD.      Trachea: No tracheal deviation.   Cardiovascular:      Rate and Rhythm: Normal rate and regular rhythm.      Heart sounds: Normal heart sounds. No murmur heard.     No friction rub. No gallop.   Pulmonary:      Effort: Pulmonary effort is normal. No respiratory distress.      Breath sounds: Normal breath sounds. No stridor. No wheezing or rales.   Chest:      Chest wall: No tenderness.   Abdominal:      General: Bowel sounds are normal. There is no distension.      Palpations: Abdomen is soft. There is no mass.      Tenderness: There is no abdominal tenderness. There is no guarding or rebound.      Hernia: No hernia is present.   Musculoskeletal:         General: Swelling and signs of injury present. No tenderness or deformity. Normal range of motion.      Cervical back: Normal range of motion and neck supple.      Right lower leg: No edema.      Comments: Dressing over left foot noted.   Lymphadenopathy:      Cervical: No cervical adenopathy.   Skin:     General: Skin is warm and dry.      Coloration: Skin is not pale.      Findings: No erythema or rash.   Neurological:      General: No focal deficit present.      Mental Status: He is alert and oriented to person, place, and time.      Cranial Nerves: No cranial nerve deficit.      Sensory: No sensory deficit.      Motor: No abnormal muscle tone.      Coordination: Coordination normal.   Psychiatric:         Mood and Affect: Mood normal.         Behavior: Behavior normal.            File Link    Scan on 3/26/2024 1423 by Ro Vinson RN: Wound 06/15/23 0102 Left anterior plantar        Key Information    Document ID File Type Document Type Description   [id number] Image WOUND IMAGE Wound 06/15/23 0102 Left anterior plantar     Import Information    Attached At Date Time User Dept   Encounter Level 3/26/2024  2:23 PM  Ro Vinson RN [facility] Emergency Dept     Encounter    Hospital Encounter on 3/26/24     Results Review:  I have reviewed the labs, radiology results, and diagnostic studies.    Laboratory Data:   Results from last 7 days   Lab Units 04/05/24 0512 04/04/24 0514 04/03/24  0334   WBC 10*3/mm3 6.93 5.64 6.54   HEMOGLOBIN g/dL 10.9* 10.0* 9.0*   HEMATOCRIT % 34.5* 31.3* 28.9*   PLATELETS 10*3/mm3 321 251 197        Results from last 7 days   Lab Units 04/05/24  0512 04/04/24  0514 04/03/24  0334 04/01/24  0402 03/31/24  0526 03/30/24  0620   SODIUM mmol/L 139  139 139 139   < > 136 135*   POTASSIUM mmol/L 4.4  4.4 4.6 4.2   < > 3.6 3.5   CHLORIDE mmol/L 102  102 102 104   < > 101 100   CO2 mmol/L 25.0  25.0 26.0 25.0   < > 23.0 24.0   BUN mg/dL 31*  31* 32* 37*   < > 51* 51*   CREATININE mg/dL 1.98*  1.98* 1.78* 1.76*   < > 2.52* 2.74*   CALCIUM mg/dL 11.1*  11.1* 10.5 9.3   < > 8.6 8.2*   BILIRUBIN mg/dL 0.4  --   --   --  0.5 0.5   ALK PHOS U/L 138*  --   --   --  110 95   ALT (SGPT) U/L 18  --   --   --  19 19   AST (SGOT) U/L 22  --   --   --  30 32   GLUCOSE mg/dL 173*  173* 185* 118*   < > 201* 104*    < > = values in this interval not displayed.       Culture Data:   Blood Culture   Date Value Ref Range Status   03/26/2024 Abnormal Stain (C)  Preliminary   03/26/2024 Abnormal Stain (C)  Preliminary       Radiology Data:   Imaging Results (Last 24 Hours)       ** No results found for the last 24 hours. **            I have reviewed the patient's current medications.     Assessment/Plan   Assessment  Active Hospital Problems    Diagnosis     **Sepsis     Diabetic foot infection     Chronic kidney disease (CKD), stage IV (severe)     Chronic diastolic heart failure     BPH without obstruction/lower urinary tract symptoms     Diabetic polyneuropathy associated with type 2 diabetes mellitus     Anemia due to chronic kidney disease     Essential hypertension     Type 2 diabetes mellitus with hyperglycemia,  with long-term current use of insulin    MRSA bacteremia  Metabolic encephalopathy secondary to sepsis  WANDER on CKD    Treatment Plan  Continue IV antibiotics with Zyvox. Infectious disease input appreciated.  Mental status appears back to baseline now that sepsis is better controlled.  However he has persistently positive blood cultures.  Plan for 2D echo on Monday.  Will hold off on inserting PICC line for now until blood cultures are clear.    Podiatry consultation input appreciated.  Will follow recommendations.  Continue wound dressings as per podiatry Follow-up repeat blood cultures.    Nephrology and urology input appreciated.  Hold Bumex due to elevated creatinine and BUN.  No signs of urinary retention at this time.    Pain control with opiate pain medications.    Insulin sliding scale and Levemir for type 2 diabetes mellitus    DVT prophylaxis with Eliquis    PT/OT/wound care consultations    DVT prophylaxis with Eliquis    CODE STATUS is full code    Medical Decision Making  Number and Complexity of problems: 4 acute, severe, high complexity medical problems.  Multiple chronic medical problems.  Differential Diagnosis: None    Conditions and Status        Condition is worsening.     MDM Data  External documents reviewed: None  Cardiac tracing (EKG, telemetry) interpretation: None  Radiology interpretation: None  Labs reviewed: CBC, BMP, blood cultures reviewed by me  Any tests that were considered but not ordered: None     Decision rules/scores evaluated (example [id number], Wells, etc): N/A     Discussed with: Patient     Care Planning  Shared decision making: Patient and his wife  Code status and discussions: CODE STATUS is full code    Disposition  Social Determinants of Health that impact treatment or disposition: None  I expect the patient to be discharged to home in 3 to 5 days.     Electronically signed by Latanya Paez MD, 04/05/24, 10:07 CDT.

## 2024-04-05 NOTE — PROGRESS NOTES
"INFECTIOUS DISEASES PROGRESS NOTE    Patient:  Erick Luong  YOB: 1955  MRN: [id number]   Admit date: 3/26/2024   Admitting Physician: Latanya Paez MD  Primary Care Physician: Del Shetty MD    Chief Complaint: Patient did not offer any complaint      Interval History: Patient still has sitter at bedside.  She noted he was able to ambulate to the bathroom and back with a walker.    I asked him for his abdomen was any better since he is having some bowel movements and he said, \"no, still sore\".    Allergies:   Allergies   Allergen Reactions    Cefepime Hives and Anaphylaxis    Bactrim [Sulfamethoxazole-Trimethoprim] Other (See Comments)     \"RENAL FAILURE\"    Vancomycin Itching    Zolpidem Unknown - High Severity     \"makes him crazy\"    Zolpidem Tartrate Unknown - Low Severity and Provider Review Needed    Metronidazole Rash       Current Scheduled Medications:   apixaban, 5 mg, Oral, Q12H  ascorbic acid, 1,000 mg, Oral, Daily  aspirin, 81 mg, Oral, Daily  bumetanide, 1 mg, Oral, Daily  calcitriol, 0.5 mcg, Oral, Daily  carvedilol, 3.125 mg, Oral, BID With Meals  donepezil, 10 mg, Oral, Daily  famotidine, 20 mg, Oral, Daily  insulin detemir, 30 Units, Subcutaneous, Nightly  insulin lispro, 4-24 Units, Subcutaneous, 4x Daily AC & at Bedtime  insulin regular, 10 Units, Subcutaneous, TID AC  isosorbide mononitrate, 30 mg, Oral, Q24H  lactulose, 20 g, Oral, TID  Linezolid, 600 mg, Intravenous, Q12H  melatonin, 6 mg, Oral, Nightly  midodrine, 2.5 mg, Oral, TID AC  mupirocin, 1 Application, Each Nare, BID  senna-docusate sodium, 1 tablet, Oral, BID   And  polyethylene glycol, 17 g, Oral, Daily  pregabalin, 100 mg, Oral, Nightly  pregabalin, 50 mg, Oral, Daily  rosuvastatin, 10 mg, Oral, Nightly  sodium chloride, 10 mL, Intravenous, Q12H  sucralfate, 1 g, Oral, TID AC  tamsulosin, 0.4 mg, Oral, Daily  timolol, 1 drop, Both Eyes, Q12H  vitamin D3, 5,000 Units, Oral, Daily  zinc sulfate, 220 mg, " "Oral, Daily      Current PRN Medications:    acetaminophen **OR** acetaminophen **OR** acetaminophen    senna-docusate sodium **AND** polyethylene glycol **AND** bisacodyl **AND** bisacodyl    dextrose    dextrose    Diclofenac Sodium    dicyclomine    glucagon (human recombinant)    haloperidol lactate    magnesium hydroxide    Morphine    nitroglycerin    ondansetron ODT **OR** ondansetron    oxyCODONE    sodium chloride    sodium chloride    sodium chloride            Objective     Vital Signs:  Temp (24hrs), Av.7 °F (36.5 °C), Min:97.3 °F (36.3 °C), Max:98.1 °F (36.7 °C)      /65 (BP Location: Right arm, Patient Position: Sitting)   Pulse 72   Temp 97.3 °F (36.3 °C) (Oral)   Resp 16   Ht 182.9 cm (72\")   Wt 133 kg (293 lb 12.8 oz)   SpO2 100%   BMI 39.85 kg/m²         Physical Exam:    General: The patient is nontoxic-appearing sitting in the recliner.  Sitter at bedside  Respiratory: Effort even and unlabored, is not conversationally dyspneic  Abdomen: Soft, nontender, no rebound or guarding  Neuro: Patient answered questions appropriately.  Assisted with scooting in recliner away from the wall so we could put it in the reclined position and elevate his lower extremities.  Psych: Flat affect  Left upper extremity IV no evidence of phlebitis.  Patient did complain of a little tenderness on palpation when specifically asked      Photos below from                       esults Review:    I reviewed the patient's new clinical results.    Lab Results:    CBC:   Lab Results   Lab 24  0620 24  0526 24  0402 24  0617 24  0334 24  0514 24  0512   WBC 5.69 5.01 5.15 7.59 6.54 5.64 6.93   HEMOGLOBIN 10.0* 10.3* 9.9* 10.8* 9.0* 10.0* 10.9*   HEMATOCRIT 30.6* 31.6* 30.6* 34.0* 28.9* 31.3* 34.5*   PLATELETS 155 149 162 197 197 251 321        AutoDiff:   Lab Results   Lab 24  0334 24  0514 24  0512   NEUTROPHIL % 55.7 56.7 57.9   LYMPHOCYTE % " 29.5 27.8 27.8   MONOCYTES % 9.3 10.1 8.5   EOSINOPHIL % 4.4 4.4 4.9   BASOPHIL % 0.5 0.5 0.6   NEUTROS ABS 3.64 3.19 4.01   LYMPHS ABS 1.93 1.57 1.93   MONOS ABS 0.61 0.57 0.59   EOS ABS 0.29 0.25 0.34   BASOS ABS 0.03 0.03 0.04        Manual Diff:    Lab Results   Lab 04/03/24  0334 04/04/24 0514 04/05/24 0512   NEUTROS ABS 3.64 3.19 4.01           CMP:   Lab Results   Lab 03/30/24  0620 03/31/24  0526 04/01/24  0402 04/03/24 0334 04/04/24 0514 04/05/24 0512   SODIUM 135* 136   < > 139 139 139   POTASSIUM 3.5 3.6   < > 4.2 4.6 4.4   CHLORIDE 100 101   < > 104 102 102   CO2 24.0 23.0   < > 25.0 26.0 25.0   BUN 51* 51*   < > 37* 32* 31*   CREATININE 2.74* 2.52*   < > 1.76* 1.78* 1.98*   CALCIUM 8.2* 8.6   < > 9.3 10.5 11.1*   BILIRUBIN 0.5 0.5  --   --   --   --    ALK PHOS 95 110  --   --   --   --    ALT (SGPT) 19 19  --   --   --   --    AST (SGOT) 32 30  --   --   --   --    GLUCOSE 104* 201*   < > 118* 185* 173*    < > = values in this interval not displayed.       Estimated Creatinine Clearance: 51.1 mL/min (A) (by C-G formula based on SCr of 1.98 mg/dL (H)).    Culture Results:    Microbiology Results (last 10 days)       Procedure Component Value - Date/Time    Blood Culture With DARSHAN - Blood, Hand, Right [[id number]]  (Normal) Collected: 04/01/24 0402    Lab Status: Preliminary result Specimen: Blood from Hand, Right Updated: 04/05/24 0445     Blood Culture No growth at 4 days    Blood Culture With DARSHAN - Blood, Arm, Left [[id number]]  (Normal) Collected: 03/30/24 0620    Lab Status: Final result Specimen: Blood from Arm, Left Updated: 04/04/24 0645     Blood Culture No growth at 5 days    Blood Culture With DARSHAN - Blood, Hand, Right [[id number]]  (Abnormal)  (Susceptibility) Collected: 03/29/24 0843    Lab Status: Final result Specimen: Blood from Hand, Right Updated: 04/02/24 0521     Blood Culture Staphylococcus aureus, MRSA     Comment:   Methicillin resistant Staphylococcus aureus, Patient may be an  isolation risk.  Infectious disease consultation is highly recommended to rule out distant foci of infection.        Isolated from Aerobic Bottle     Gram Stain Aerobic Bottle Gram positive cocci    Susceptibility        Staphylococcus aureus, MRSA      MATT      Gentamicin Susceptible      Oxacillin Resistant      Rifampin Susceptible      Vancomycin Susceptible                       Susceptibility Comments       Staphylococcus aureus, MRSA    This isolate is presumed to be clindamycin resistant based on detection of inducible clindamycin resistance.  Clindamycin may still be effective in some patients.               Blood Culture ID, PCR - Blood, Hand, Right [[id number]]  (Abnormal) Collected: 03/29/24 0843    Lab Status: Final result Specimen: Blood from Hand, Right Updated: 03/30/24 2028     BCID, PCR Staph aureus. mecA/C and MREJ (methicillin resistance gene) detected. Identification by BCID2 PCR.     BOTTLE TYPE Aerobic Bottle    Narrative:      Infectious disease consultation is highly recommended to rule out distant foci of infection.    MRSA Screen, PCR (Inpatient) - Swab, Nares [[id number]]  (Abnormal) Collected: 03/28/24 2128    Lab Status: Final result Specimen: Swab from Nares Updated: 03/28/24 2239     MRSA PCR MRSA Detected    Narrative:      The negative predictive value of this diagnostic test is high and should only be used to consider de-escalating anti-MRSA therapy. A positive result may indicate colonization with MRSA and must be correlated clinically.    AMAN AURIS SCREEN - Swab, Axilla Right, Axilla Left and Groin [[id number]]  (Normal) Collected: 03/27/24 0351    Lab Status: Final result Specimen: Swab from Axilla Right, Axilla Left and Groin Updated: 04/01/24 0415     Candida Auris Screen Culture No Candida auris isolated at 5 days    Respiratory Panel PCR w/COVID-19(SARS-CoV-2) [facility] In-House, NP Swab in UTM/VTM, 2 HR TAT - Swab, Nasopharynx [[id number]]  (Normal) Collected:  03/26/24 1732    Lab Status: Final result Specimen: Swab from Nasopharynx Updated: 03/26/24 1905     ADENOVIRUS, PCR Not Detected     Coronavirus 229E Not Detected     Coronavirus HKU1 Not Detected     Coronavirus NL63 Not Detected     Coronavirus OC43 Not Detected     COVID19 Not Detected     Human Metapneumovirus Not Detected     Human Rhinovirus/Enterovirus Not Detected     Influenza A PCR Not Detected     Influenza B PCR Not Detected     Parainfluenza Virus 1 Not Detected     Parainfluenza Virus 2 Not Detected     Parainfluenza Virus 3 Not Detected     Parainfluenza Virus 4 Not Detected     RSV, PCR Not Detected     Bordetella pertussis pcr Not Detected     Bordetella parapertussis PCR Not Detected     Chlamydophila pneumoniae PCR Not Detected     Mycoplasma pneumo by PCR Not Detected    Narrative:      In the setting of a positive respiratory panel with a viral infection PLUS a negative procalcitonin without other underlying concern for bacterial infection, consider observing off antibiotics or discontinuation of antibiotics and continue supportive care. If the respiratory panel is positive for atypical bacterial infection (Bordetella pertussis, Chlamydophila pneumoniae, or Mycoplasma pneumoniae), consider antibiotic de-escalation to target atypical bacterial infection.    Blood Culture - Blood, Arm, Right [[id number]]  (Abnormal) Collected: 03/26/24 1346    Lab Status: Final result Specimen: Blood from Arm, Right Updated: 03/29/24 0513     Blood Culture Staphylococcus aureus, MRSA     Comment:   Infectious disease consultation is highly recommended to rule out distant foci of infection.  Methicillin resistant Staphylococcus aureus, Patient may be an isolation risk.        Isolated from Aerobic and Anaerobic Bottles     Gram Stain Aerobic Bottle Gram positive cocci      Anaerobic Bottle Gram positive cocci    Blood Culture - Blood, Arm, Left [[id number]]  (Abnormal)  (Susceptibility) Collected: 03/26/24 134     Lab Status: Final result Specimen: Blood from Arm, Left Updated: 03/29/24 0513     Blood Culture Staphylococcus aureus, MRSA     Comment:   Infectious disease consultation is highly recommended to rule out distant foci of infection.  Methicillin resistant Staphylococcus aureus, Patient may be an isolation risk.        Isolated from Aerobic and Anaerobic Bottles     Gram Stain Aerobic Bottle Gram positive cocci      Anaerobic Bottle Gram positive cocci    Susceptibility        Staphylococcus aureus, MRSA      MATT      Gentamicin Susceptible      Oxacillin Resistant      Rifampin Susceptible      Vancomycin Susceptible                       Susceptibility Comments       Staphylococcus aureus, MRSA    This isolate is presumed to be clindamycin resistant based on detection of inducible clindamycin resistance.  Clindamycin may still be effective in some patients.               Blood Culture ID, PCR - Blood, Arm, Left [[id number]]  (Abnormal) Collected: 03/26/24 1346    Lab Status: Final result Specimen: Blood from Arm, Left Updated: 03/27/24 0426     BCID, PCR Staph aureus. mecA/C and MREJ (methicillin resistance gene) detected. Identification by BCID2 PCR.     BOTTLE TYPE Aerobic Bottle    Narrative:      Infectious disease consultation is highly recommended to rule out distant foci of infection.             Interpretation Summary         The study is technically difficult for diagnosis.  If there is concern for possible infective endocarditis, recommend transesophageal echocardiogram to better visualize the valves.    Left ventricular systolic function is normal. Left ventricular ejection fraction appears to be 61 - 65%.    Left ventricular wall thickness is consistent with mild concentric hypertrophy    Left ventricular diastolic function is consistent with (grade III w/high LAP) fixed restrictive pattern.    Mildly reduced right ventricular systolic function noted.    The left atrial cavity is mildly dilated.    There  is mild calcification of the aortic valve. No aortic valve regurgitation is present. No hemodynamically significant aortic valve stenosis is present.       Signed    Electronically signed by Omkar Charles DO on 4/2/24 at 1412 CDT       Radiology:         Abdominal films above.  Patient with large amount of stool noted.    Imaging Results (Last 72 Hours)       Procedure Component Value Units Date/Time    XR Abdomen KUB [[id number]] Collected: 04/03/24 1345     Updated: 04/03/24 1349    Narrative:      EXAMINATION: XR ABDOMEN KUB- 4/3/2024 1:45 PM     HISTORY: abdominal distention; E11.628-Type 2 diabetes mellitus with  other skin complications; L08.9-Local infection of the skin and  subcutaneous tissue, unspecified; E11.65-Type 2 diabetes mellitus with  hyperglycemia; Z74.09-Other reduced mobility.     REPORT: Supine imaging of the abdomen was performed.     COMPARISON: KUB 10/7/2023.     A large amount of stool seen within the transverse colon and flexures,  likely representing constipation. No evidence of bowel obstruction is  identified. There are no definite urinary tract calculi. Cholecystectomy  clips are present. No acute osseous abnormality is identified.       Impression:      Extensive constipation within the transverse colon and  flexures.     This report was signed and finalized on 4/3/2024 1:46 PM by Dr. Percy Cesar MD.                   Active Hospital Problems    Diagnosis     **Sepsis     Diabetic foot infection     Chronic kidney disease (CKD), stage IV (severe)     Chronic diastolic heart failure     BPH without obstruction/lower urinary tract symptoms     Diabetic polyneuropathy associated with type 2 diabetes mellitus     Anemia due to chronic kidney disease     Essential hypertension     Type 2 diabetes mellitus with hyperglycemia, with long-term current use of insulin        IMPRESSION:    MRSA bacteremia-blood cultures + March 26 and March 29.  Secondary to infected left foot  with wounds and associated cellulitis on admission.  Blood cultures negative as of March 30.  Chronic kidney disease stage IIIb-with acute kidney injury-improved off Bumex and bumex restarted 4/3-creatinine trending up today and Bumex to be held.  Type 2 diabetes mellitus-poorly controlled with hemoglobin A1c of 10.7 this admission  Abdominal pain likely related to extensive constipation.  Continues on bowel regimen and has had some documented bowel movements.  MRSA nasal screen positive      RECOMMENDATION:   Continue linezolid-  Continue to monitor CBC on linezolid.  Wound care per Dr. Cerrato's orders  Glucose management per attending  Bowel regimen as per orders      Currently plan for at least 2 weeks of linezolid with start date after clearance of blood cultures-currently March 30.  Should be able to transition to oral linezolid given increased bioavailability.         Julia Adrian MD  04/05/24  08:31 CDT

## 2024-04-05 NOTE — THERAPY TREATMENT NOTE
Patient Name: Erick Luong  : 1955    MRN: [id number]                              Today's Date: 2024       Admit Date: 3/26/2024    Visit Dx:     ICD-10-CM ICD-9-CM   1. Diabetic foot infection  E11.628 250.80    L08.9 686.9   2. Poorly controlled diabetes mellitus  E11.65 250.00   3. Impaired functional mobility and activity tolerance [Z74.09]  Z74.09 V49.89     Patient Active Problem List   Diagnosis    Obesity, unspecified obesity severity, unspecified obesity type    Essential hypertension    Type 2 diabetes mellitus with hyperglycemia, with long-term current use of insulin    Nonsmoker    Anemia due to chronic kidney disease    Class 3 severe obesity due to excess calories with body mass index (BMI) of 40.0 to 44.9 in adult    Anasarca    Sleep apnea with use of continuous positive airway pressure (CPAP)    Medically noncompliant    Diabetic ulcer of left midfoot associated with type 2 diabetes mellitus, with fat layer exposed    Diabetic polyneuropathy associated with type 2 diabetes mellitus    Spinal stenosis in cervical region    Degeneration of cervical intervertebral disc    Cervical radiculopathy    Degeneration of lumbar or lumbosacral intervertebral disc    Cervical myelopathy    Bilateral carpal tunnel syndrome    CAD (coronary artery disease)    GERD without esophagitis    BPH without obstruction/lower urinary tract symptoms    Stage 3b chronic kidney disease    Chronic diastolic heart failure    Type 2 myocardial infarction due to heart failure    Left carpal tunnel syndrome    Syncope and collapse, recurrent episodes    Poorly-controlled hypertension    Rhinovirus    Peripheral vascular disease    Chronic kidney disease (CKD), stage IV (severe)    Diabetic foot infection    Sepsis     Past Medical History:   Diagnosis Date    Arthritis     Autonomic disease     CAD (coronary artery disease) 2017    Cervical radiculopathy 2021    Chronic constipation with acute  exaccerbation 05/10/2021    Coronary artery disease     Degeneration of cervical intervertebral disc 08/11/2021    Diabetes mellitus     Diabetic foot ulcer 08/31/2020    Diabetic polyneuropathy associated with type 2 diabetes mellitus 01/18/2021    Elevated cholesterol     Gastroesophageal reflux disease 05/13/2019    Headache     HTN (hypertension), benign 05/03/2017    Hyperlipidemia     Hypertension     Mixed hyperlipidemia 02/07/2017    Multiple lung nodules 01/26/2020    5mm, 9 mm RLL identified 1/2020, not present 10/2019.    Myocardial infarction     Osteomyelitis 01/22/2020    Osteomyelitis of fifth toe of right foot 10/07/2019    Pancreatitis     Persistent insomnia 01/20/2020    Renal disorder     Sleep apnea 02/06/2017    Sleep apnea with use of continuous positive airway pressure (CPAP)     NON-COMPLIANT    Slow transit constipation 01/16/2019    Spinal stenosis in cervical region 09/16/2021    Vitamin D deficiency 03/02/2021     Past Surgical History:   Procedure Laterality Date    ABDOMINAL SURGERY      AMPUTATION FOOT / TOE Left 10/2021    5th digit     ANTERIOR CERVICAL DISCECTOMY W/ FUSION N/A 8/5/2022    Procedure: CERVICAL DISCECTOMY ANTERIOR WITH FUSION C5-6 with possible plating of C5-7 with neuromonitoring and 1 c-arm;  Surgeon: Karel Soliz MD;  Location:  [facility] OR;  Service: Neurosurgery;  Laterality: N/A;    APPENDECTOMY      BACK SURGERY      CARDIAC CATHETERIZATION Left 02/08/2021    Procedure: Left Heart Cath w poss intervention left anatomical snuff box acess;  Surgeon: Omkar Charles DO;  Location:  [facility]ATH [facility]AS[facility]ATION;  Service: Cardiology;  Laterality: Left;    CARDIAC SURGERY      CATARACT EXTRACTION      CERVICAL SPINE SURGERY      COLONOSCOPY N/A 01/31/2017    Normal exam repeat in 5 years    COLONOSCOPY N/A 02/11/2019    Mild acute inflammation    COLONOSCOPY W/ POLYPECTOMY  03/04/2014    Hyperplastic polyp    CORONARY ARTERY BYPASS GRAFT  10/2015     ENDOSCOPY  04/13/2011    Gastritis with hemorrhage    ENDOSCOPY N/A 05/05/2017    Normal exam    ENDOSCOPY N/A 02/11/2019    Gastritis    ENDOSCOPY N/A 09/01/2020    Non-erosive gastritis with hemorrhage    ENDOSCOPY N/A 02/10/2021    Esophagitis    FOOT SURGERY Left     INCISION AND DRAINAGE OF WOUND Left 09/2007    spider bite      General Information       Row Name 04/05/24 1118          OT Time and Intention    Document Type therapy note (daily note)  -     Mode of Treatment occupational therapy  -       Row Name 04/05/24 1118          General Information    Patient Profile Reviewed yes  -LS     Existing Precautions/Restrictions fall  LLE WBAT in CAM boot  -       Row Name 04/05/24 1118          Cognition    Orientation Status (Cognition) oriented x 3;oriented to;person;place;situation;disoriented to;time  -       Row Name 04/05/24 1118          Safety Issues, Functional Mobility    Safety Issues Affecting Function (Mobility) ability to follow commands;insight into deficits/self-awareness;positioning of assistive device;awareness of need for assistance;safety precaution awareness;safety precautions follow-through/compliance;impulsivity;problem-solving;judgment  -     Impairments Affecting Function (Mobility) balance;endurance/activity tolerance;pain;cognition;strength  -     Cognitive Impairments, Mobility Safety/Performance attention;awareness, need for assistance;insight into deficits/self-awareness;judgment;problem-solving/reasoning;safety precaution awareness;safety precaution follow-through;impulsivity;sequencing abilities  -               User Key  (r) = Recorded By, (t) = Taken By, (c) = Cosigned By      Initials Name Provider Type     Alesha Barrett OTR/L Occupational Therapist                     Mobility/ADL's       Row Name 04/05/24 1118          Bed Mobility    Bed Mobility supine-sit  -LS     Supine-Sit [place] (Bed Mobility) standby assist  -LS     Assistive Device (Bed  Mobility) bed rails;head of bed elevated  -LS       Row Name 04/05/24 1118          Transfers    Transfers sit-stand transfer;stand-sit transfer  -LS       Row Name 04/05/24 1118          Sit-Stand Transfer    Sit-Stand [place] (Transfers) minimum assist (75% patient effort);verbal cues  -LS     Assistive Device (Sit-Stand Transfers) walker, front-wheeled  -LS       Row Name 04/05/24 1118          Stand-Sit Transfer    Stand-Sit [place] (Transfers) contact guard  -       Row Name 04/05/24 1118          Functional Mobility    Functional Mobility- Ind. Level contact guard assist  -LS     Functional Mobility- Device walker, front-wheeled  -LS     Patient was able to Ambulate yes  -LS       Row Name 04/05/24 1118          Activities of Daily Living    BADL Assessment/Intervention grooming  -       Row Name 04/05/24 1118          Mobility    Extremity Weight-bearing Status left lower extremity  -LS     Left Lower Extremity (Weight-bearing Status) weight-bearing as tolerated (WBAT)  with CAM boot  -       Row Name 04/05/24 1118          Grooming Assessment/Training    [place] Level (Grooming) grooming skills;oral care regimen;contact guard assist  -LS     Position (Grooming) sink side;supported standing  -LS               User Key  (r) = Recorded By, (t) = Taken By, (c) = Cosigned By      Initials Name Provider Type    Alesha Bloom OTR/L Occupational Therapist                   Obj/Interventions    No documentation.                  Goals/Plan    No documentation.                  Clinical Impression       Row Name 04/05/24 1118          Pain Assessment    Pretreatment Pain Rating 8/10  -LS     Posttreatment Pain Rating 8/10  -LS     Pain Location lower  -LS     Pain Location - abdomen  -LS       Row Name 04/05/24 1118          Plan of Care Review    Plan of Care Reviewed With patient  -LS     Progress no change  -LS     Outcome Evaluation OT tx completed. Pt in fowlers upon therapist arrival;  A&Ox3; c/o 8/10 pain in lower abdomen; sitter present. Pt demo'd increased difficulty following commands on this date. Pt performed supine>sit utilizing bedrail with HOB elevated with SBA. Pt required Max A to dong LLE CAM boot while seated EOB. Pt performed sit>stand requiring Min A and verbal cues to push from the bed this date. Pt ambulated from bed<>BR utilizing rwx with CGA. Pt performed oral hygiene while standing with single UE support at sink with CGA; Pt required intermittent verbal cues for sequencing. Pt continues to benefit from skilled OT intervention in order to address deficits in fxl mobility, fxl activity tolerance, balance, strength, and use of adaptive techniques/equipment during performance of BADLs. Recommend SNF at discharge.  -       Row Name 04/05/24 1118          Therapy Plan Review/Discharge Plan (OT)    Anticipated Discharge Disposition (OT) skilled nursing facility  -       Row Name 04/05/24 1118          Positioning and Restraints    Pre-Treatment Position in bed  -LS     Post Treatment Position chair  -LS     In Chair call light within reach;sitting;encouraged to call for assist;with other staff  -               User Key  (r) = Recorded By, (t) = Taken By, (c) = Cosigned By      Initials Name Provider Type    LS Alesha Barrett, OTR/L Occupational Therapist                   Outcome Measures       Row Name 04/05/24 1118          How much help from another is currently needed...    Putting on and taking off regular lower body clothing? 2  -LS     Bathing (including washing, rinsing, and drying) 2  -LS     Toileting (which includes using toilet bed pan or urinal) 2  -LS     Putting on and taking off regular upper body clothing 3  -LS     Taking care of personal grooming (such as brushing teeth) 3  -LS     Eating meals 4  -LS     AM-PAC 6 Clicks Score (OT) 16  -       Row Name 04/05/24 0816          How much help from another person do you currently need...    Turning from your back  to your side while in flat bed without using bedrails? 4  -SM     Moving from lying on back to sitting on the side of a flat bed without bedrails? 4  -SM     Moving to and from a bed to a chair (including a wheelchair)? 4  -SM     Standing up from a chair using your arms (e.g., wheelchair, bedside chair)? 4  -SM     Climbing 3-5 steps with a railing? 3  -SM     To walk in hospital room? 3  -SM     AM-PAC 6 Clicks Score (PT) 22  -SM     Highest Level of Mobility Goal 7 --> Walk 25 feet or more  -       Row Name 04/05/24 1118          Functional Assessment    Outcome Measure Options AM-PAC 6 Clicks Daily Activity (OT)  -LS               User Key  (r) = Recorded By, (t) = Taken By, (c) = Cosigned By      Initials Name Provider Type    Racquel Austin, RN Registered Nurse    Alesha Bloom, OTR/L Occupational Therapist                    Occupational Therapy Education       Title: PT OT SLP Therapies (In Progress)       Topic: Occupational Therapy (In Progress)       Point: ADL training (Done)       Description:   Instruct learner(s) on proper safety adaptation and remediation techniques during self care or transfers.   Instruct in proper use of assistive devices.                  Learning Progress Summary             Patient Acceptance, E, VU,NR by LS at 4/5/2024 1240    Acceptance, E,D, NR,NL by LR at 4/4/2024 1325    Acceptance, E,D, VU,NR by LR at 4/3/2024 1119    Acceptance, E, VU,NR by LS at 4/2/2024 1128    Acceptance, E, VU,NR by  at 3/29/2024 1424    Comment: Role of OT, OT POC, fall prevention, benefits of ambulation, d/c recommendations                         Point: Home exercise program (Not Started)       Description:   Instruct learner(s) on appropriate technique for monitoring, assisting and/or progressing therapeutic exercises/activities.                  Learner Progress:  Not documented in this visit.              Point: Precautions (Done)       Description:   Instruct learner(s) on prescribed  precautions during self-care and functional transfers.                  Learning Progress Summary             Patient Acceptance, E, VU,NR by LS at 4/5/2024 1240    Acceptance, E,D, NR,NL by LR at 4/4/2024 1325    Acceptance, E,D, VU,NR by LR at 4/3/2024 1119    Acceptance, E, VU,NR by LS at 4/2/2024 1128    Acceptance, E, VU,NR by  at 3/29/2024 1424    Comment: Role of OT, OT POC, fall prevention, benefits of ambulation, d/c recommendations                         Point: Body mechanics (Done)       Description:   Instruct learner(s) on proper positioning and spine alignment during self-care, functional mobility activities and/or exercises.                  Learning Progress Summary             Patient Acceptance, E, VU,NR by LS at 4/5/2024 1240    Acceptance, E,D, NR,NL by LR at 4/4/2024 1325    Acceptance, E,D, VU,NR by LR at 4/3/2024 1119    Acceptance, E, VU,NR by  at 4/2/2024 1128    Acceptance, E, VU,NR by  at 3/29/2024 1424    Comment: Role of OT, OT POC, fall prevention, benefits of ambulation, d/c recommendations                                         User Key       Initials Effective Dates Name Provider Type Discipline     06/20/22 -  Alesha Barrett OTR/L Occupational Therapist OT    LR 04/25/23 -  Tabitha Webb OTR/L Occupational Therapist OT     01/09/24 -  Yulia Pritchett OT Student OT Student OT                  OT Recommendation and Plan     Plan of Care Review  Plan of Care Reviewed With: patient  Progress: no change  Outcome Evaluation: OT tx completed. Pt in fowlers upon therapist arrival; A&Ox3; c/o 8/10 pain in lower abdomen; sitter present. Pt demo'd increased difficulty following commands on this date. Pt performed supine>sit utilizing bedrail with HOB elevated with SBA. Pt required Max A to dong LLE CAM boot while seated EOB. Pt performed sit>stand requiring Min A and verbal cues to push from the bed this date. Pt ambulated from bed<>BR utilizing rwx with CGA. Pt performed  oral hygiene while standing with single UE support at sink with CGA; Pt required intermittent verbal cues for sequencing. Pt continues to benefit from skilled OT intervention in order to address deficits in fxl mobility, fxl activity tolerance, balance, strength, and use of adaptive techniques/equipment during performance of BADLs. Recommend SNF at discharge.     Time Calculation:         Time Calculation- OT       Row Name 04/05/24 1118             Time Calculation- OT    OT Start Time 1118  -LS      OT Stop Time 1141  -LS      OT Time Calculation (min) 23 min  -LS      Total Timed Code Minutes- OT 23 minute(s)  -LS      OT Received On 04/05/24  -                User Key  (r) = Recorded By, (t) = Taken By, (c) = Cosigned By      Initials Name Provider Type     Alesha Barrett OTR/L Occupational Therapist                  Therapy Charges for Today       Code Description Service Date Service Provider Modifiers Qty    [id number] HC OT SELF CARE/MGMT/TRAIN EA 15 MIN 4/5/2024 Alesha Barrett OTR/L GO 2                 ALEXANDER Gross/EDVIN  4/5/2024

## 2024-04-05 NOTE — CASE MANAGEMENT/SOCIAL WORK
Continued Stay Note  [facility]     Patient Name: Erick Luong  MRN: [id number]  Today's Date: 4/5/2024    Admit Date: 3/26/2024    Plan: Home   Discharge Plan       Row Name 04/05/24 1356       Plan    Plan Home    Patient/Family in Agreement with Plan yes    Plan Comments Spouse plans to take pt home at d/c. She is hoping he will start clearing up mentally. Will follow.                   Discharge Codes    No documentation.                 Expected Discharge Date and Time       Expected Discharge Date Expected Discharge Time    Apr 8, 2024               MEJIA Carreno

## 2024-04-06 ENCOUNTER — ANESTHESIA EVENT (OUTPATIENT)
Dept: PERIOP | Facility: HOSPITAL | Age: 69
End: 2024-04-06
Payer: COMMERCIAL

## 2024-04-06 ENCOUNTER — APPOINTMENT (OUTPATIENT)
Dept: NUCLEAR MEDICINE | Facility: HOSPITAL | Age: 69
DRG: 871 | End: 2024-04-06
Payer: COMMERCIAL

## 2024-04-06 LAB
25(OH)D3 SERPL-MCNC: 49.4 NG/ML (ref 30–100)
ANION GAP SERPL CALCULATED.3IONS-SCNC: 9 MMOL/L (ref 5–15)
APTT PPP: 39.2 SECONDS (ref 24.5–36)
BACTERIA SPEC AEROBE CULT: NORMAL
BASOPHILS # BLD AUTO: 0.04 10*3/MM3 (ref 0–0.2)
BASOPHILS NFR BLD AUTO: 0.7 % (ref 0–1.5)
BUN SERPL-MCNC: 33 MG/DL (ref 8–23)
BUN/CREAT SERPL: 15.7 (ref 7–25)
CALCIUM SPEC-SCNC: 10.7 MG/DL (ref 8.6–10.5)
CHLORIDE SERPL-SCNC: 104 MMOL/L (ref 98–107)
CO2 SERPL-SCNC: 28 MMOL/L (ref 22–29)
CREAT SERPL-MCNC: 2.1 MG/DL (ref 0.76–1.27)
DEPRECATED RDW RBC AUTO: 45.8 FL (ref 37–54)
EGFRCR SERPLBLD CKD-EPI 2021: 33.9 ML/MIN/1.73
EOSINOPHIL # BLD AUTO: 0.23 10*3/MM3 (ref 0–0.4)
EOSINOPHIL NFR BLD AUTO: 4.2 % (ref 0.3–6.2)
ERYTHROCYTE [DISTWIDTH] IN BLOOD BY AUTOMATED COUNT: 14.1 % (ref 12.3–15.4)
GLUCOSE BLDC GLUCOMTR-MCNC: 100 MG/DL (ref 70–130)
GLUCOSE BLDC GLUCOMTR-MCNC: 182 MG/DL (ref 70–130)
GLUCOSE BLDC GLUCOMTR-MCNC: 195 MG/DL (ref 70–130)
GLUCOSE BLDC GLUCOMTR-MCNC: 214 MG/DL (ref 70–130)
GLUCOSE BLDC GLUCOMTR-MCNC: 231 MG/DL (ref 70–130)
GLUCOSE SERPL-MCNC: 86 MG/DL (ref 65–99)
HCT VFR BLD AUTO: 28.8 % (ref 37.5–51)
HCT VFR BLD AUTO: 29.5 % (ref 37.5–51)
HCT VFR BLD AUTO: 30 % (ref 37.5–51)
HCT VFR BLD AUTO: 30.2 % (ref 37.5–51)
HGB BLD-MCNC: 9.3 G/DL (ref 13–17.7)
HGB BLD-MCNC: 9.4 G/DL (ref 13–17.7)
HGB BLD-MCNC: 9.5 G/DL (ref 13–17.7)
HGB BLD-MCNC: 9.5 G/DL (ref 13–17.7)
IMM GRANULOCYTES # BLD AUTO: 0.02 10*3/MM3 (ref 0–0.05)
IMM GRANULOCYTES NFR BLD AUTO: 0.4 % (ref 0–0.5)
INR PPP: 1.13 (ref 0.91–1.09)
LYMPHOCYTES # BLD AUTO: 1.69 10*3/MM3 (ref 0.7–3.1)
LYMPHOCYTES NFR BLD AUTO: 31.1 % (ref 19.6–45.3)
MCH RBC QN AUTO: 28.2 PG (ref 26.6–33)
MCHC RBC AUTO-ENTMCNC: 31.5 G/DL (ref 31.5–35.7)
MCV RBC AUTO: 89.6 FL (ref 79–97)
MONOCYTES # BLD AUTO: 0.53 10*3/MM3 (ref 0.1–0.9)
MONOCYTES NFR BLD AUTO: 9.8 % (ref 5–12)
NEUTROPHILS NFR BLD AUTO: 2.92 10*3/MM3 (ref 1.7–7)
NEUTROPHILS NFR BLD AUTO: 53.8 % (ref 42.7–76)
NRBC BLD AUTO-RTO: 0 /100 WBC (ref 0–0.2)
PLATELET # BLD AUTO: 294 10*3/MM3 (ref 140–450)
PMV BLD AUTO: 10.5 FL (ref 6–12)
POTASSIUM SERPL-SCNC: 4.5 MMOL/L (ref 3.5–5.2)
PROTHROMBIN TIME: 15 SECONDS (ref 11.8–14.8)
PTH-INTACT SERPL-MCNC: 7.3 PG/ML (ref 15–65)
RBC # BLD AUTO: 3.37 10*6/MM3 (ref 4.14–5.8)
SODIUM SERPL-SCNC: 141 MMOL/L (ref 136–145)
WBC NRBC COR # BLD AUTO: 5.43 10*3/MM3 (ref 3.4–10.8)

## 2024-04-06 PROCEDURE — 85610 PROTHROMBIN TIME: CPT | Performed by: INTERNAL MEDICINE

## 2024-04-06 PROCEDURE — 63710000001 INSULIN LISPRO (HUMAN) PER 5 UNITS: Performed by: NURSE PRACTITIONER

## 2024-04-06 PROCEDURE — 85025 COMPLETE CBC W/AUTO DIFF WBC: CPT | Performed by: INTERNAL MEDICINE

## 2024-04-06 PROCEDURE — 99222 1ST HOSP IP/OBS MODERATE 55: CPT | Performed by: INTERNAL MEDICINE

## 2024-04-06 PROCEDURE — 82306 VITAMIN D 25 HYDROXY: CPT | Performed by: NURSE PRACTITIONER

## 2024-04-06 PROCEDURE — 63710000001 INSULIN REGULAR HUMAN PER 5 UNITS: Performed by: EMERGENCY MEDICINE

## 2024-04-06 PROCEDURE — 25010000002 MORPHINE PER 10 MG: Performed by: INTERNAL MEDICINE

## 2024-04-06 PROCEDURE — 85730 THROMBOPLASTIN TIME PARTIAL: CPT | Performed by: INTERNAL MEDICINE

## 2024-04-06 PROCEDURE — 78278 ACUTE GI BLOOD LOSS IMAGING: CPT

## 2024-04-06 PROCEDURE — A9560 TC99M LABELED RBC: HCPCS | Performed by: HOSPITALIST

## 2024-04-06 PROCEDURE — 97116 GAIT TRAINING THERAPY: CPT

## 2024-04-06 PROCEDURE — 99232 SBSQ HOSP IP/OBS MODERATE 35: CPT | Performed by: INTERNAL MEDICINE

## 2024-04-06 PROCEDURE — 63710000001 INSULIN DETEMIR PER 5 UNITS: Performed by: NURSE PRACTITIONER

## 2024-04-06 PROCEDURE — 85014 HEMATOCRIT: CPT | Performed by: HOSPITALIST

## 2024-04-06 PROCEDURE — 85018 HEMOGLOBIN: CPT | Performed by: HOSPITALIST

## 2024-04-06 PROCEDURE — 80048 BASIC METABOLIC PNL TOTAL CA: CPT | Performed by: INTERNAL MEDICINE

## 2024-04-06 PROCEDURE — 82948 REAGENT STRIP/BLOOD GLUCOSE: CPT

## 2024-04-06 PROCEDURE — 83970 ASSAY OF PARATHORMONE: CPT | Performed by: NURSE PRACTITIONER

## 2024-04-06 PROCEDURE — 0 TECHNETIUM LABELED RED BLOOD CELLS: Performed by: HOSPITALIST

## 2024-04-06 RX ORDER — HYDROCORTISONE ACETATE 25 MG/1
25 SUPPOSITORY RECTAL 2 TIMES DAILY
Status: DISCONTINUED | OUTPATIENT
Start: 2024-04-06 | End: 2024-04-11 | Stop reason: HOSPADM

## 2024-04-06 RX ADMIN — POLYETHYLENE GLYCOL-3350 AND ELECTROLYTES 3000 ML: 236; 6.74; 5.86; 2.97; 22.74 POWDER, FOR SOLUTION ORAL at 18:42

## 2024-04-06 RX ADMIN — MORPHINE SULFATE 2 MG: 2 INJECTION, SOLUTION INTRAMUSCULAR; INTRAVENOUS at 21:41

## 2024-04-06 RX ADMIN — OXYCODONE HYDROCHLORIDE AND ACETAMINOPHEN 1000 MG: 500 TABLET ORAL at 09:33

## 2024-04-06 RX ADMIN — ISOSORBIDE MONONITRATE 30 MG: 30 TABLET, EXTENDED RELEASE ORAL at 09:32

## 2024-04-06 RX ADMIN — ROSUVASTATIN CALCIUM 10 MG: 10 TABLET, FILM COATED ORAL at 21:28

## 2024-04-06 RX ADMIN — HYDROCORTISONE ACETATE 25 MG: 25 SUPPOSITORY RECTAL at 11:37

## 2024-04-06 RX ADMIN — LACTULOSE 20 G: 20 SOLUTION ORAL at 17:19

## 2024-04-06 RX ADMIN — INSULIN LISPRO 4 UNITS: 100 INJECTION, SOLUTION INTRAVENOUS; SUBCUTANEOUS at 17:30

## 2024-04-06 RX ADMIN — DICYCLOMINE HYDROCHLORIDE 20 MG: 10 CAPSULE ORAL at 23:11

## 2024-04-06 RX ADMIN — LINEZOLID 600 MG: 600 TABLET, FILM COATED ORAL at 21:29

## 2024-04-06 RX ADMIN — LACTULOSE 20 G: 20 SOLUTION ORAL at 09:32

## 2024-04-06 RX ADMIN — HYDROCORTISONE ACETATE 25 MG: 25 SUPPOSITORY RECTAL at 21:29

## 2024-04-06 RX ADMIN — SUCRALFATE 1 G: 1 SUSPENSION ORAL at 17:18

## 2024-04-06 RX ADMIN — TIMOLOL MALEATE 1 DROP: 2.5 SOLUTION/ DROPS OPHTHALMIC at 21:28

## 2024-04-06 RX ADMIN — TECHNETIUM TC 99M-LABELED RED BLOOD CELLS 1 DOSE: KIT at 12:00

## 2024-04-06 RX ADMIN — LINEZOLID 600 MG: 600 TABLET, FILM COATED ORAL at 09:33

## 2024-04-06 RX ADMIN — INSULIN DETEMIR 30 UNITS: 100 INJECTION, SOLUTION SUBCUTANEOUS at 21:52

## 2024-04-06 RX ADMIN — ZINC SULFATE 220 MG (50 MG) CAPSULE 220 MG: CAPSULE at 09:34

## 2024-04-06 RX ADMIN — Medication 6 MG: at 21:28

## 2024-04-06 RX ADMIN — TIMOLOL MALEATE 1 DROP: 2.5 SOLUTION/ DROPS OPHTHALMIC at 13:40

## 2024-04-06 RX ADMIN — Medication 10 ML: at 21:29

## 2024-04-06 RX ADMIN — SUCRALFATE 1 G: 1 SUSPENSION ORAL at 11:37

## 2024-04-06 RX ADMIN — SUCRALFATE 1 G: 1 SUSPENSION ORAL at 09:32

## 2024-04-06 RX ADMIN — Medication 1 APPLICATION: at 09:34

## 2024-04-06 RX ADMIN — TAMSULOSIN HYDROCHLORIDE 0.4 MG: 0.4 CAPSULE ORAL at 09:33

## 2024-04-06 RX ADMIN — OXYCODONE HYDROCHLORIDE 10 MG: 5 TABLET ORAL at 17:18

## 2024-04-06 RX ADMIN — CARVEDILOL 3.12 MG: 3.12 TABLET, FILM COATED ORAL at 17:18

## 2024-04-06 RX ADMIN — MIDODRINE HYDROCHLORIDE 2.5 MG: 2.5 TABLET ORAL at 09:33

## 2024-04-06 RX ADMIN — PREGABALIN 50 MG: 50 CAPSULE ORAL at 09:33

## 2024-04-06 RX ADMIN — CARVEDILOL 3.12 MG: 3.12 TABLET, FILM COATED ORAL at 09:32

## 2024-04-06 RX ADMIN — PANTOPRAZOLE SODIUM 40 MG: 40 INJECTION, POWDER, FOR SOLUTION INTRAVENOUS at 21:29

## 2024-04-06 RX ADMIN — LACTULOSE 20 G: 20 SOLUTION ORAL at 21:27

## 2024-04-06 RX ADMIN — INSULIN HUMAN 10 UNITS: 100 INJECTION, SOLUTION PARENTERAL at 13:40

## 2024-04-06 RX ADMIN — PREGABALIN 100 MG: 100 CAPSULE ORAL at 21:30

## 2024-04-06 RX ADMIN — PANTOPRAZOLE SODIUM 40 MG: 40 INJECTION, POWDER, FOR SOLUTION INTRAVENOUS at 09:34

## 2024-04-06 RX ADMIN — Medication 1 APPLICATION: at 21:30

## 2024-04-06 RX ADMIN — DOCUSATE SODIUM AND SENNOSIDES 1 TABLET: 8.6; 5 TABLET, FILM COATED ORAL at 09:34

## 2024-04-06 RX ADMIN — INSULIN LISPRO 4 UNITS: 100 INJECTION, SOLUTION INTRAVENOUS; SUBCUTANEOUS at 21:52

## 2024-04-06 RX ADMIN — Medication 10 ML: at 09:34

## 2024-04-06 RX ADMIN — POLYETHYLENE GLYCOL 3350 17 G: 17 POWDER, FOR SOLUTION ORAL at 09:32

## 2024-04-06 RX ADMIN — MIDODRINE HYDROCHLORIDE 2.5 MG: 2.5 TABLET ORAL at 17:18

## 2024-04-06 RX ADMIN — DOCUSATE SODIUM AND SENNOSIDES 1 TABLET: 8.6; 5 TABLET, FILM COATED ORAL at 21:28

## 2024-04-06 RX ADMIN — DONEPEZIL HYDROCHLORIDE 10 MG: 10 TABLET, FILM COATED ORAL at 09:34

## 2024-04-06 NOTE — PLAN OF CARE
Goal Outcome Evaluation:      - oriented x4. - wound care of left foot. - VSS. - no reports of pain. - abdominal discomfort at times. UOP: 4 unmeasured occurences.      Problem: Adult Inpatient Plan of Care  Goal: Plan of Care Review  Outcome: Ongoing, Progressing  Goal: Patient-Specific Goal (Individualized)  Outcome: Ongoing, Progressing  Goal: Absence of Hospital-Acquired Illness or Injury  Outcome: Ongoing, Progressing  Intervention: Identify and Manage Fall Risk  Recent Flowsheet Documentation  Taken 4/6/2024 0400 by William Benitez RN  Safety Promotion/Fall Prevention:   safety round/check completed   room organization consistent   clutter free environment maintained  Taken 4/6/2024 0300 by Willaim Benitez RN  Safety Promotion/Fall Prevention:   safety round/check completed   room organization consistent   clutter free environment maintained  Taken 4/6/2024 0200 by William Benitez RN  Safety Promotion/Fall Prevention:   safety round/check completed   room organization consistent   clutter free environment maintained  Taken 4/6/2024 0100 by William Benitez RN  Safety Promotion/Fall Prevention:   safety round/check completed   room organization consistent   clutter free environment maintained  Taken 4/6/2024 0000 by William Benitez RN  Safety Promotion/Fall Prevention:   safety round/check completed   room organization consistent   clutter free environment maintained  Taken 4/5/2024 2300 by William Benitez RN  Safety Promotion/Fall Prevention:   safety round/check completed   room organization consistent   clutter free environment maintained  Taken 4/5/2024 2200 by William Benitez RN  Safety Promotion/Fall Prevention:   safety round/check completed   room organization consistent   clutter free environment maintained  Taken 4/5/2024 2100 by William Benitez RN  Safety Promotion/Fall Prevention:   safety round/check completed   room organization consistent   clutter free environment maintained  Taken 4/5/2024 2000 by  [place], William, RN  Safety Promotion/Fall Prevention:   safety round/check completed   room organization consistent   clutter free environment maintained  Taken 4/5/2024 1900 by William Benitez RN  Safety Promotion/Fall Prevention:   safety round/check completed   room organization consistent   clutter free environment maintained  Intervention: Prevent Skin Injury  Recent Flowsheet Documentation  Taken 4/6/2024 0400 by William Benitez RN  Body Position: (up in chair)   position changed independently   weight shifting  Taken 4/6/2024 0200 by William Benitez RN  Body Position: (up in chair)   position changed independently   weight shifting  Taken 4/6/2024 0000 by William Benitez RN  Body Position: (up in chair)   position changed independently   weight shifting  Taken 4/5/2024 2000 by William Benitez RN  Body Position:   position changed independently   weight shifting  Intervention: Prevent and Manage VTE (Venous Thromboembolism) Risk  Recent Flowsheet Documentation  Taken 4/5/2024 2000 by William Benitez RN  Activity Management: up in chair  VTE Prevention/Management: (see MAR) other (see comments)  Range of Motion: active ROM (range of motion) encouraged  Intervention: Prevent Infection  Recent Flowsheet Documentation  Taken 4/5/2024 2300 by William Benitez RN  Infection Prevention:   single patient room provided   rest/sleep promoted   hand hygiene promoted  Taken 4/5/2024 2200 by William Bneitez RN  Infection Prevention:   single patient room provided   rest/sleep promoted   hand hygiene promoted  Taken 4/5/2024 2100 by William Benitez RN  Infection Prevention:   single patient room provided   rest/sleep promoted   hand hygiene promoted  Taken 4/5/2024 2000 by William Benitez RN  Infection Prevention:   single patient room provided   rest/sleep promoted  Taken 4/5/2024 1900 by William Benitez RN  Infection Prevention:   single patient room provided   rest/sleep promoted   hand hygiene promoted  Goal: Optimal Comfort and  Wellbeing  Outcome: Ongoing, Progressing  Intervention: Provide Person-Centered Care  Recent Flowsheet Documentation  Taken 4/5/2024 2000 by William Benitez RN  Trust Relationship/Rapport:   care explained   choices provided   reassurance provided   thoughts/feelings acknowledged  Goal: Readiness for Transition of Care  Outcome: Ongoing, Progressing     Problem: Adjustment to Illness (Sepsis/Septic Shock)  Goal: Optimal Coping  Outcome: Ongoing, Progressing  Intervention: Optimize Psychosocial Adjustment to Illness  Recent Flowsheet Documentation  Taken 4/5/2024 2000 by William Benitez RN  Family/Support System Care: support provided     Problem: Bleeding (Sepsis/Septic Shock)  Goal: Absence of Bleeding  Outcome: Ongoing, Progressing     Problem: Glycemic Control Impaired (Sepsis/Septic Shock)  Goal: Blood Glucose Level Within Desired Range  Outcome: Ongoing, Progressing     Problem: Infection Progression (Sepsis/Septic Shock)  Goal: Absence of Infection Signs and Symptoms  Outcome: Ongoing, Progressing  Intervention: Initiate Sepsis Management  Recent Flowsheet Documentation  Taken 4/5/2024 2300 by William Benitez RN  Infection Prevention:   single patient room provided   rest/sleep promoted   hand hygiene promoted  Isolation Precautions: precautions maintained  Taken 4/5/2024 2200 by William Benitze RN  Infection Prevention:   single patient room provided   rest/sleep promoted   hand hygiene promoted  Isolation Precautions: precautions maintained  Taken 4/5/2024 2100 by William Benitez RN  Infection Prevention:   single patient room provided   rest/sleep promoted   hand hygiene promoted  Isolation Precautions: precautions maintained  Taken 4/5/2024 2000 by William Benitez RN  Infection Prevention:   single patient room provided   rest/sleep promoted  Isolation Precautions: precautions maintained  Taken 4/5/2024 1900 by William Benitez RN  Infection Prevention:   single patient room provided   rest/sleep promoted   hand  hygiene promoted  Isolation Precautions: precautions maintained  Intervention: Promote Recovery  Recent Flowsheet Documentation  Taken 4/5/2024 2000 by William Benitez RN  Activity Management: up in chair     Problem: Nutrition Impaired (Sepsis/Septic Shock)  Goal: Optimal Nutrition Intake  Outcome: Ongoing, Progressing     Problem: Asthma Comorbidity  Goal: Maintenance of Asthma Control  Outcome: Ongoing, Progressing  Intervention: Maintain Asthma Symptom Control  Recent Flowsheet Documentation  Taken 4/6/2024 0400 by William Benitez RN  Medication Review/Management: medications reviewed  Taken 4/6/2024 0300 by William Benitez RN  Medication Review/Management: medications reviewed  Taken 4/6/2024 0200 by William Benitez RN  Medication Review/Management: medications reviewed  Taken 4/6/2024 0100 by William Benitez RN  Medication Review/Management: medications reviewed  Taken 4/6/2024 0000 by William Benitez RN  Medication Review/Management: medications reviewed  Taken 4/5/2024 2300 by William Benitez RN  Medication Review/Management: medications reviewed  Taken 4/5/2024 2200 by William Benitez RN  Medication Review/Management: medications reviewed  Taken 4/5/2024 2100 by William Benitez RN  Medication Review/Management: medications reviewed  Taken 4/5/2024 2000 by William Benitez RN  Medication Review/Management: medications reviewed  Taken 4/5/2024 1900 by William Benitez RN  Medication Review/Management: medications reviewed     Problem: Behavioral Health Comorbidity  Goal: Maintenance of Behavioral Health Symptom Control  Outcome: Ongoing, Progressing  Intervention: Maintain Behavioral Health Symptom Control  Recent Flowsheet Documentation  Taken 4/6/2024 0400 by William Benitez RN  Medication Review/Management: medications reviewed  Taken 4/6/2024 0300 by William Benitez RN  Medication Review/Management: medications reviewed  Taken 4/6/2024 0200 by William Benitez RN  Medication Review/Management: medications reviewed  Taken  4/6/2024 0100 by William Benitez RN  Medication Review/Management: medications reviewed  Taken 4/6/2024 0000 by William Benitez RN  Medication Review/Management: medications reviewed  Taken 4/5/2024 2300 by William Benitez RN  Medication Review/Management: medications reviewed  Taken 4/5/2024 2200 by William Benitez RN  Medication Review/Management: medications reviewed  Taken 4/5/2024 2100 by William Benitez RN  Medication Review/Management: medications reviewed  Taken 4/5/2024 2000 by William Benitez RN  Medication Review/Management: medications reviewed  Taken 4/5/2024 1900 by William Benitez RN  Medication Review/Management: medications reviewed     Problem: COPD (Chronic Obstructive Pulmonary Disease) Comorbidity  Goal: Maintenance of COPD Symptom Control  Outcome: Ongoing, Progressing  Intervention: Maintain COPD-Symptom Control  Recent Flowsheet Documentation  Taken 4/6/2024 0400 by William Benitez RN  Medication Review/Management: medications reviewed  Taken 4/6/2024 0300 by William Benitez RN  Medication Review/Management: medications reviewed  Taken 4/6/2024 0200 by William Benitez RN  Medication Review/Management: medications reviewed  Taken 4/6/2024 0100 by William Benitez RN  Medication Review/Management: medications reviewed  Taken 4/6/2024 0000 by William Benitez RN  Medication Review/Management: medications reviewed  Taken 4/5/2024 2300 by William Benitez RN  Medication Review/Management: medications reviewed  Taken 4/5/2024 2200 by William Benitez RN  Medication Review/Management: medications reviewed  Taken 4/5/2024 2100 by William Benitez RN  Medication Review/Management: medications reviewed  Taken 4/5/2024 2000 by William Benitez RN  Medication Review/Management: medications reviewed  Taken 4/5/2024 1900 by William Benitez RN  Medication Review/Management: medications reviewed     Problem: Diabetes Comorbidity  Goal: Blood Glucose Level Within Targeted Range  Outcome: Ongoing, Progressing     Problem: Heart  Failure Comorbidity  Goal: Maintenance of Heart Failure Symptom Control  Outcome: Ongoing, Progressing  Intervention: Maintain Heart Failure-Management  Recent Flowsheet Documentation  Taken 4/6/2024 0400 by William Benitez RN  Medication Review/Management: medications reviewed  Taken 4/6/2024 0300 by William Benitez RN  Medication Review/Management: medications reviewed  Taken 4/6/2024 0200 by William Benitez RN  Medication Review/Management: medications reviewed  Taken 4/6/2024 0100 by William Benitez RN  Medication Review/Management: medications reviewed  Taken 4/6/2024 0000 by William Benitez RN  Medication Review/Management: medications reviewed  Taken 4/5/2024 2300 by William Benitez RN  Medication Review/Management: medications reviewed  Taken 4/5/2024 2200 by William Benitez RN  Medication Review/Management: medications reviewed  Taken 4/5/2024 2100 by William Benitez RN  Medication Review/Management: medications reviewed  Taken 4/5/2024 2000 by William Benitez RN  Medication Review/Management: medications reviewed  Taken 4/5/2024 1900 by William Benitez RN  Medication Review/Management: medications reviewed     Problem: Hypertension Comorbidity  Goal: Blood Pressure in Desired Range  Outcome: Ongoing, Progressing  Intervention: Maintain Blood Pressure Management  Recent Flowsheet Documentation  Taken 4/6/2024 0400 by William Benitez RN  Medication Review/Management: medications reviewed  Taken 4/6/2024 0300 by William Benitez RN  Medication Review/Management: medications reviewed  Taken 4/6/2024 0200 by William Benitez RN  Medication Review/Management: medications reviewed  Taken 4/6/2024 0100 by William Benitez RN  Medication Review/Management: medications reviewed  Taken 4/6/2024 0000 by William Benitez RN  Medication Review/Management: medications reviewed  Taken 4/5/2024 2300 by William Benitez RN  Medication Review/Management: medications reviewed  Taken 4/5/2024 2200 by William Benitez RN  Medication  Review/Management: medications reviewed  Taken 4/5/2024 2100 by William Benitez RN  Medication Review/Management: medications reviewed  Taken 4/5/2024 2000 by William Benitez RN  Medication Review/Management: medications reviewed  Taken 4/5/2024 1900 by William Benitez RN  Medication Review/Management: medications reviewed     Problem: Obstructive Sleep Apnea Risk or Actual Comorbidity Management  Goal: Unobstructed Breathing During Sleep  Outcome: Ongoing, Progressing     Problem: Osteoarthritis Comorbidity  Goal: Maintenance of Osteoarthritis Symptom Control  Outcome: Ongoing, Progressing  Intervention: Maintain Osteoarthritis Symptom Control  Recent Flowsheet Documentation  Taken 4/6/2024 0400 by William Benitez RN  Medication Review/Management: medications reviewed  Taken 4/6/2024 0300 by William Benitez RN  Medication Review/Management: medications reviewed  Taken 4/6/2024 0200 by William Benitez RN  Medication Review/Management: medications reviewed  Taken 4/6/2024 0100 by William Benitez RN  Medication Review/Management: medications reviewed  Taken 4/6/2024 0000 by William Benitez RN  Medication Review/Management: medications reviewed  Taken 4/5/2024 2300 by William Benitez RN  Medication Review/Management: medications reviewed  Taken 4/5/2024 2200 by William Benitez RN  Medication Review/Management: medications reviewed  Taken 4/5/2024 2100 by William Benitez RN  Medication Review/Management: medications reviewed  Taken 4/5/2024 2000 by William Benitez RN  Activity Management: up in chair  Medication Review/Management: medications reviewed  Taken 4/5/2024 1900 by William Benitez RN  Medication Review/Management: medications reviewed     Problem: Pain Chronic (Persistent) (Comorbidity Management)  Goal: Acceptable Pain Control and Functional Ability  Outcome: Ongoing, Progressing  Intervention: Manage Persistent Pain  Recent Flowsheet Documentation  Taken 4/6/2024 0400 by William Benitez RN  Medication Review/Management:  medications reviewed  Taken 4/6/2024 0300 by William Benitez RN  Medication Review/Management: medications reviewed  Taken 4/6/2024 0200 by William Benitez RN  Medication Review/Management: medications reviewed  Taken 4/6/2024 0100 by William Benitez RN  Medication Review/Management: medications reviewed  Taken 4/6/2024 0000 by William Benitez RN  Medication Review/Management: medications reviewed  Taken 4/5/2024 2300 by William Benitez RN  Medication Review/Management: medications reviewed  Taken 4/5/2024 2200 by William Benitez RN  Medication Review/Management: medications reviewed  Taken 4/5/2024 2100 by William Benitez RN  Medication Review/Management: medications reviewed  Taken 4/5/2024 2000 by William Benitez RN  Medication Review/Management: medications reviewed  Taken 4/5/2024 1900 by William Benitez RN  Medication Review/Management: medications reviewed  Intervention: Optimize Psychosocial Wellbeing  Recent Flowsheet Documentation  Taken 4/5/2024 2000 by William Benitez RN  Diversional Activities: television  Family/Support System Care: support provided     Problem: Seizure Disorder Comorbidity  Goal: Maintenance of Seizure Control  Outcome: Ongoing, Progressing     Problem: Impaired Wound Healing  Goal: Optimal Wound Healing  Outcome: Ongoing, Progressing  Intervention: Promote Wound Healing  Recent Flowsheet Documentation  Taken 4/5/2024 2000 by William Benitez RN  Activity Management: up in chair     Problem: Fall Injury Risk  Goal: Absence of Fall and Fall-Related Injury  Outcome: Ongoing, Progressing  Intervention: Identify and Manage Contributors  Recent Flowsheet Documentation  Taken 4/6/2024 0400 by William Benitez RN  Medication Review/Management: medications reviewed  Taken 4/6/2024 0300 by William Benitez RN  Medication Review/Management: medications reviewed  Taken 4/6/2024 0200 by William Benitez RN  Medication Review/Management: medications reviewed  Taken 4/6/2024 0100 by William Benitez RN  Medication  Review/Management: medications reviewed  Taken 4/6/2024 0000 by William Benitez RN  Medication Review/Management: medications reviewed  Taken 4/5/2024 2300 by William Benitez RN  Medication Review/Management: medications reviewed  Taken 4/5/2024 2200 by William Benitez RN  Medication Review/Management: medications reviewed  Taken 4/5/2024 2100 by William Benitez RN  Medication Review/Management: medications reviewed  Taken 4/5/2024 2000 by William Benitez RN  Medication Review/Management: medications reviewed  Taken 4/5/2024 1900 by William Benitez RN  Medication Review/Management: medications reviewed  Intervention: Promote Injury-Free Environment  Recent Flowsheet Documentation  Taken 4/6/2024 0400 by William Benitez RN  Safety Promotion/Fall Prevention:   safety round/check completed   room organization consistent   clutter free environment maintained  Taken 4/6/2024 0300 by William Benitez RN  Safety Promotion/Fall Prevention:   safety round/check completed   room organization consistent   clutter free environment maintained  Taken 4/6/2024 0200 by William Benitez RN  Safety Promotion/Fall Prevention:   safety round/check completed   room organization consistent   clutter free environment maintained  Taken 4/6/2024 0100 by William Benitez RN  Safety Promotion/Fall Prevention:   safety round/check completed   room organization consistent   clutter free environment maintained  Taken 4/6/2024 0000 by William Benitez RN  Safety Promotion/Fall Prevention:   safety round/check completed   room organization consistent   clutter free environment maintained  Taken 4/5/2024 2300 by William Benitez RN  Safety Promotion/Fall Prevention:   safety round/check completed   room organization consistent   clutter free environment maintained  Taken 4/5/2024 2200 by William Benitez RN  Safety Promotion/Fall Prevention:   safety round/check completed   room organization consistent   clutter free environment maintained  Taken 4/5/2024 2100 by  [place], William, RN  Safety Promotion/Fall Prevention:   safety round/check completed   room organization consistent   clutter free environment maintained  Taken 4/5/2024 2000 by William Benitez RN  Safety Promotion/Fall Prevention:   safety round/check completed   room organization consistent   clutter free environment maintained  Taken 4/5/2024 1900 by William Benitez RN  Safety Promotion/Fall Prevention:   safety round/check completed   room organization consistent   clutter free environment maintained     Problem: Skin Injury Risk Increased  Goal: Skin Health and Integrity  Outcome: Ongoing, Progressing  Intervention: Optimize Skin Protection  Recent Flowsheet Documentation  Taken 4/6/2024 0400 by William Benitez RN  Head of Bed (HOB) Positioning: HOB elevated  Taken 4/6/2024 0200 by William Benitez RN  Head of Bed (HOB) Positioning: HOB elevated  Taken 4/5/2024 2000 by William Benitez RN  Head of Bed (HOB) Positioning: HOB elevated     Problem: Restraint, Nonviolent  Goal: Absence of Harm or Injury  Outcome: Ongoing, Progressing  Intervention: Implement Least Restrictive Safety Strategies  Recent Flowsheet Documentation  Taken 4/5/2024 2300 by William Benitez RN  Medical Device Protection:   IV pole/bag removed from visual field   torso covered   tubing secured  Taken 4/5/2024 2200 by William Benitez RN  Medical Device Protection:   IV pole/bag removed from visual field   torso covered   tubing secured  Taken 4/5/2024 2100 by William Benitez RN  Medical Device Protection:   IV pole/bag removed from visual field   torso covered   tubing secured  Taken 4/5/2024 2000 by William Benitez RN  Medical Device Protection:   IV pole/bag removed from visual field   torso covered   tubing secured  Diversional Activities: television  Taken 4/5/2024 1900 by William Benitez RN  Medical Device Protection:   IV pole/bag removed from visual field   torso covered   tubing secured  Intervention: Protect Dignity, Rights, and Personal  Wellbeing  Recent Flowsheet Documentation  Taken 4/5/2024 2000 by William Benitez RN  Trust Relationship/Rapport:   care explained   choices provided   reassurance provided   thoughts/feelings acknowledged  Intervention: Protect Skin and Joint Integrity  Recent Flowsheet Documentation  Taken 4/6/2024 0400 by William Benitez RN  Body Position: (up in chair)   position changed independently   weight shifting  Taken 4/6/2024 0200 by William Benitez RN  Body Position: (up in chair)   position changed independently   weight shifting  Taken 4/6/2024 0000 by William Benitez RN  Body Position: (up in chair)   position changed independently   weight shifting  Taken 4/5/2024 2000 by William Benitez RN  Body Position:   position changed independently   weight shifting  Range of Motion: active ROM (range of motion) encouraged

## 2024-04-06 NOTE — PROGRESS NOTES
"INFECTIOUS DISEASES PROGRESS NOTE    Patient:  Erick Luong  YOB: 1955  MRN: [id number]   Admit date: 3/26/2024   Admitting Physician: Latanya Paez MD  Primary Care Physician: Del Shetty MD    Chief Complaint: Stomach \"sore\"    Interval History: Patient was up to bathroom.  He did have offload boot on.  Small stool noted with some bright red blood in the toilet.  Apparently this started yesterday.  GI consulted.      Allergies:   Allergies   Allergen Reactions    Cefepime Hives and Anaphylaxis    Bactrim [Sulfamethoxazole-Trimethoprim] Other (See Comments)     \"RENAL FAILURE\"    Vancomycin Itching    Zolpidem Unknown - High Severity     \"makes him crazy\"    Zolpidem Tartrate Unknown - Low Severity and Provider Review Needed    Metronidazole Rash       Current Scheduled Medications:   [Held by provider] apixaban, 5 mg, Oral, Q12H  ascorbic acid, 1,000 mg, Oral, Daily  [Held by provider] aspirin, 81 mg, Oral, Daily  [Held by provider] bumetanide, 1 mg, Oral, Daily  carvedilol, 3.125 mg, Oral, BID With Meals  donepezil, 10 mg, Oral, Daily  insulin detemir, 30 Units, Subcutaneous, Nightly  insulin lispro, 4-24 Units, Subcutaneous, 4x Daily AC & at Bedtime  insulin regular, 10 Units, Subcutaneous, TID AC  isosorbide mononitrate, 30 mg, Oral, Q24H  lactulose, 20 g, Oral, TID  linezolid, 600 mg, Oral, Q12H  melatonin, 6 mg, Oral, Nightly  midodrine, 2.5 mg, Oral, TID AC  mupirocin, 1 Application, Each Nare, BID  pantoprazole, 40 mg, Intravenous, Q12H  senna-docusate sodium, 1 tablet, Oral, BID   And  polyethylene glycol, 17 g, Oral, Daily  pregabalin, 100 mg, Oral, Nightly  pregabalin, 50 mg, Oral, Daily  rosuvastatin, 10 mg, Oral, Nightly  sodium chloride, 10 mL, Intravenous, Q12H  sucralfate, 1 g, Oral, TID AC  tamsulosin, 0.4 mg, Oral, Daily  timolol, 1 drop, Both Eyes, Q12H  zinc sulfate, 220 mg, Oral, Daily      Current PRN Medications:    acetaminophen **OR** acetaminophen **OR** " "acetaminophen    senna-docusate sodium **AND** polyethylene glycol **AND** bisacodyl **AND** bisacodyl    dextrose    dextrose    Diclofenac Sodium    dicyclomine    glucagon (human recombinant)    haloperidol lactate    magnesium hydroxide    Morphine    nitroglycerin    ondansetron ODT **OR** ondansetron    oxyCODONE    sodium chloride    sodium chloride    sodium chloride            Objective     Vital Signs:  Temp (24hrs), Av.5 °F (36.4 °C), Min:96.9 °F (36.1 °C), Max:97.8 °F (36.6 °C)      /66 (BP Location: Right arm, Patient Position: Sitting)   Pulse 71   Temp 96.9 °F (36.1 °C) (Axillary)   Resp 18   Ht 182.9 cm (72\")   Wt 131 kg (289 lb 3.2 oz)   SpO2 100%   BMI 39.22 kg/m²         Physical Exam:    General: Patient was standing up in the bathroom and he was about to ambulate back to his chair.  Lower extremity dressing in place.  Patient had offloading boot in place.  Noted bowel movement with bright red blood in toilet.    Photos below from                       esults Review:    I reviewed the patient's new clinical results.    Lab Results:    CBC:   Lab Results   Lab 24  0526 24  0402 24  0617 24  0334 24  0514 24  0512 24  0340   WBC 5.01 5.15 7.59 6.54 5.64 6.93 5.43   HEMOGLOBIN 10.3* 9.9* 10.8* 9.0* 10.0* 10.9* 9.5*   HEMATOCRIT 31.6* 30.6* 34.0* 28.9* 31.3* 34.5* 30.2*   PLATELETS 149 162 197 197 251 321 294        AutoDiff:   Lab Results   Lab 24  0514 24  0512 24  0340   NEUTROPHIL % 56.7 57.9 53.8   LYMPHOCYTE % 27.8 27.8 31.1   MONOCYTES % 10.1 8.5 9.8   EOSINOPHIL % 4.4 4.9 4.2   BASOPHIL % 0.5 0.6 0.7   NEUTROS ABS 3.19 4.01 2.92   LYMPHS ABS 1.57 1.93 1.69   MONOS ABS 0.57 0.59 0.53   EOS ABS 0.25 0.34 0.23   BASOS ABS 0.03 0.04 0.04        Manual Diff:    Lab Results   Lab 24  0514 24  0512 24  0340   NEUTROS ABS 3.19 4.01 2.92           CMP:   Lab Results   Lab 24  0526 24  0402 " 04/04/24  0514 04/05/24  0512 04/06/24  0340   SODIUM 136   < > 139 139  139 141   POTASSIUM 3.6   < > 4.6 4.4  4.4 4.5   CHLORIDE 101   < > 102 102  102 104   CO2 23.0   < > 26.0 25.0  25.0 28.0   BUN 51*   < > 32* 31*  31* 33*   CREATININE 2.52*   < > 1.78* 1.98*  1.98* 2.10*   CALCIUM 8.6   < > 10.5 11.1*  11.1* 10.7*   BILIRUBIN 0.5  --   --  0.4  --    ALK PHOS 110  --   --  138*  --    ALT (SGPT) 19  --   --  18  --    AST (SGOT) 30  --   --  22  --    GLUCOSE 201*   < > 185* 173*  173* 86    < > = values in this interval not displayed.       Estimated Creatinine Clearance: 47.8 mL/min (A) (by C-G formula based on SCr of 2.1 mg/dL (H)).    Culture Results:    Microbiology Results (last 10 days)       Procedure Component Value - Date/Time    Blood Culture With DARSHAN - Blood, Hand, Right [[id number]]  (Normal) Collected: 04/01/24 0402    Lab Status: Final result Specimen: Blood from Hand, Right Updated: 04/06/24 0445     Blood Culture No growth at 5 days    Blood Culture With DARSHAN - Blood, Arm, Left [[id number]]  (Normal) Collected: 03/30/24 0620    Lab Status: Final result Specimen: Blood from Arm, Left Updated: 04/04/24 0645     Blood Culture No growth at 5 days    Blood Culture With DARSHAN - Blood, Hand, Right [[id number]]  (Abnormal)  (Susceptibility) Collected: 03/29/24 0843    Lab Status: Final result Specimen: Blood from Hand, Right Updated: 04/02/24 0521     Blood Culture Staphylococcus aureus, MRSA     Comment:   Methicillin resistant Staphylococcus aureus, Patient may be an isolation risk.  Infectious disease consultation is highly recommended to rule out distant foci of infection.        Isolated from Aerobic Bottle     Gram Stain Aerobic Bottle Gram positive cocci    Susceptibility        Staphylococcus aureus, MRSA      MATT      Gentamicin Susceptible      Oxacillin Resistant      Rifampin Susceptible      Vancomycin Susceptible                       Susceptibility Comments       Staphylococcus  aureus, MRSA    This isolate is presumed to be clindamycin resistant based on detection of inducible clindamycin resistance.  Clindamycin may still be effective in some patients.               Blood Culture ID, PCR - Blood, Hand, Right [[id number]]  (Abnormal) Collected: 03/29/24 0843    Lab Status: Final result Specimen: Blood from Hand, Right Updated: 03/30/24 2028     BCID, PCR Staph aureus. mecA/C and MREJ (methicillin resistance gene) detected. Identification by BCID2 PCR.     BOTTLE TYPE Aerobic Bottle    Narrative:      Infectious disease consultation is highly recommended to rule out distant foci of infection.    MRSA Screen, PCR (Inpatient) - Swab, Nares [[id number]]  (Abnormal) Collected: 03/28/24 2128    Lab Status: Final result Specimen: Swab from Nares Updated: 03/28/24 2239     MRSA PCR MRSA Detected    Narrative:      The negative predictive value of this diagnostic test is high and should only be used to consider de-escalating anti-MRSA therapy. A positive result may indicate colonization with MRSA and must be correlated clinically.             Interpretation Summary         The study is technically difficult for diagnosis.  If there is concern for possible infective endocarditis, recommend transesophageal echocardiogram to better visualize the valves.    Left ventricular systolic function is normal. Left ventricular ejection fraction appears to be 61 - 65%.    Left ventricular wall thickness is consistent with mild concentric hypertrophy    Left ventricular diastolic function is consistent with (grade III w/high LAP) fixed restrictive pattern.    Mildly reduced right ventricular systolic function noted.    The left atrial cavity is mildly dilated.    There is mild calcification of the aortic valve. No aortic valve regurgitation is present. No hemodynamically significant aortic valve stenosis is present.       Signed    Electronically signed by Omkar Charles DO on 4/2/24 at 1412 CDT        Radiology:         Abdominal films above.  Patient with large amount of stool noted.    Imaging Results (Last 72 Hours)       Procedure Component Value Units Date/Time    XR Abdomen KUB [[id number]] Collected: 04/03/24 1345     Updated: 04/03/24 1349    Narrative:      EXAMINATION: XR ABDOMEN KUB- 4/3/2024 1:45 PM     HISTORY: abdominal distention; E11.628-Type 2 diabetes mellitus with  other skin complications; L08.9-Local infection of the skin and  subcutaneous tissue, unspecified; E11.65-Type 2 diabetes mellitus with  hyperglycemia; Z74.09-Other reduced mobility.     REPORT: Supine imaging of the abdomen was performed.     COMPARISON: KUB 10/7/2023.     A large amount of stool seen within the transverse colon and flexures,  likely representing constipation. No evidence of bowel obstruction is  identified. There are no definite urinary tract calculi. Cholecystectomy  clips are present. No acute osseous abnormality is identified.       Impression:      Extensive constipation within the transverse colon and  flexures.     This report was signed and finalized on 4/3/2024 1:46 PM by Dr. Percy Cesar MD.                   Active Hospital Problems    Diagnosis     **Sepsis     Diabetic foot infection     Chronic kidney disease (CKD), stage IV (severe)     Chronic diastolic heart failure     BPH without obstruction/lower urinary tract symptoms     Diabetic polyneuropathy associated with type 2 diabetes mellitus     Anemia due to chronic kidney disease     Essential hypertension     Type 2 diabetes mellitus with hyperglycemia, with long-term current use of insulin        IMPRESSION:    MRSA bacteremia-blood cultures + March 26 and March 29.  Secondary to infected left foot with wounds and associated cellulitis on admission.  Blood cultures negative as of March 30.  Today is day #8/14 of antibiotic therapy since first negative blood culture  Chronic kidney disease stage IIIb-creatinine has been somewhat variable  and improved off Bumex however Bumex was restarted.  Difficult situation  Type 2 diabetes mellitus-poorly controlled with hemoglobin A1c of 10.7 this admission  Bright red blood per rectum-has been seen by GI.  Plan for colonoscopy  MRSA nasal screen positive      RECOMMENDATION:   Continue linezolid-changed to p.o.  Wound care per Dr. Cerrato's orders  Glucose management per attending      Discussed with Dr Paez    >>> will see again Tuesday, call in interim if needed    Julia Adrian MD  04/06/24  09:15 CDT

## 2024-04-06 NOTE — THERAPY TREATMENT NOTE
Acute Care - Physical Therapy Treatment Note  [facility]     Patient Name: Erick Luong  : 1955  MRN: [id number]  Today's Date: 2024      Visit Dx:     ICD-10-CM ICD-9-CM   1. Diabetic foot infection  E11.628 250.80    L08.9 686.9   2. Poorly controlled diabetes mellitus  E11.65 250.00   3. Impaired functional mobility and activity tolerance [Z74.09]  Z74.09 V49.89   4. Rectal bleeding  K62.5 569.3   5. Anemia due to stage 3 chronic kidney disease, unspecified whether stage 3a or 3b CKD  N18.30 285.21    D63.1 585.3     Patient Active Problem List   Diagnosis    Obesity, unspecified obesity severity, unspecified obesity type    Essential hypertension    Type 2 diabetes mellitus with hyperglycemia, with long-term current use of insulin    Nonsmoker    Anemia due to chronic kidney disease    Class 3 severe obesity due to excess calories with body mass index (BMI) of 40.0 to 44.9 in adult    Anasarca    Sleep apnea with use of continuous positive airway pressure (CPAP)    Medically noncompliant    Diabetic ulcer of left midfoot associated with type 2 diabetes mellitus, with fat layer exposed    Diabetic polyneuropathy associated with type 2 diabetes mellitus    Spinal stenosis in cervical region    Degeneration of cervical intervertebral disc    Cervical radiculopathy    Degeneration of lumbar or lumbosacral intervertebral disc    Cervical myelopathy    Bilateral carpal tunnel syndrome    CAD (coronary artery disease)    GERD without esophagitis    BPH without obstruction/lower urinary tract symptoms    Stage 3b chronic kidney disease    Chronic diastolic heart failure    Type 2 myocardial infarction due to heart failure    Left carpal tunnel syndrome    Syncope and collapse, recurrent episodes    Poorly-controlled hypertension    Rhinovirus    Peripheral vascular disease    Chronic kidney disease (CKD), stage IV (severe)    Diabetic foot infection    Sepsis     Past Medical History:   Diagnosis Date     Arthritis     Autonomic disease     CAD (coronary artery disease) 02/06/2017    Cervical radiculopathy 09/16/2021    Chronic constipation with acute exaccerbation 05/10/2021    Coronary artery disease     Degeneration of cervical intervertebral disc 08/11/2021    Diabetes mellitus     Diabetic foot ulcer 08/31/2020    Diabetic polyneuropathy associated with type 2 diabetes mellitus 01/18/2021    Elevated cholesterol     Gastroesophageal reflux disease 05/13/2019    Headache     HTN (hypertension), benign 05/03/2017    Hyperlipidemia     Hypertension     Mixed hyperlipidemia 02/07/2017    Multiple lung nodules 01/26/2020    5mm, 9 mm RLL identified 1/2020, not present 10/2019.    Myocardial infarction     Osteomyelitis 01/22/2020    Osteomyelitis of fifth toe of right foot 10/07/2019    Pancreatitis     Persistent insomnia 01/20/2020    Renal disorder     Sleep apnea 02/06/2017    Sleep apnea with use of continuous positive airway pressure (CPAP)     NON-COMPLIANT    Slow transit constipation 01/16/2019    Spinal stenosis in cervical region 09/16/2021    Vitamin D deficiency 03/02/2021     Past Surgical History:   Procedure Laterality Date    ABDOMINAL SURGERY      AMPUTATION FOOT / TOE Left 10/2021    5th digit     ANTERIOR CERVICAL DISCECTOMY W/ FUSION N/A 8/5/2022    Procedure: CERVICAL DISCECTOMY ANTERIOR WITH FUSION C5-6 with possible plating of C5-7 with neuromonitoring and 1 c-arm;  Surgeon: Karel Soliz MD;  Location:  [facility] OR;  Service: Neurosurgery;  Laterality: N/A;    APPENDECTOMY      BACK SURGERY      CARDIAC CATHETERIZATION Left 02/08/2021    Procedure: Left Heart Cath w poss intervention left anatomical snuff box acess;  Surgeon: Omkar Charles DO;  Location:  [facility] CATH INVASIVE LOCATION;  Service: Cardiology;  Laterality: Left;    CARDIAC SURGERY      CATARACT EXTRACTION      CERVICAL SPINE SURGERY      COLONOSCOPY N/A 01/31/2017    Normal exam repeat in 5 years    COLONOSCOPY N/A  02/11/2019    Mild acute inflammation    COLONOSCOPY W/ POLYPECTOMY  03/04/2014    Hyperplastic polyp    CORONARY ARTERY BYPASS GRAFT  10/2015    ENDOSCOPY  04/13/2011    Gastritis with hemorrhage    ENDOSCOPY N/A 05/05/2017    Normal exam    ENDOSCOPY N/A 02/11/2019    Gastritis    ENDOSCOPY N/A 09/01/2020    Non-erosive gastritis with hemorrhage    ENDOSCOPY N/A 02/10/2021    Esophagitis    FOOT SURGERY Left     INCISION AND DRAINAGE OF WOUND Left 09/2007    spider bite     PT Assessment (Last 12 Hours)       PT Evaluation and Treatment       [facility] Name 04/06/24 1437          Physical Therapy Time and Intention    Subjective Information complains of;pain;weakness  -     Document Type therapy note (daily note)  -     Mode of Treatment physical therapy  -     Patient Effort adequate  -[facility] Name 04/06/24 1437          General Information    Existing Precautions/Restrictions fall  cam boot L foot when up  -     Limitations/Impairments safety/cognitive  -[facility] Name 04/06/24 1437          Pain    Pretreatment Pain Rating 6/10  -     Posttreatment Pain Rating 6/10  -     Pain Location - abdomen;back  -     Pain Intervention(s) Repositioned;Ambulation/increased activity  -[facility] Name 04/06/24 1437          Bed Mobility    Comment, (Bed Mobility) chair  -[facility] Name 04/06/24 1437          Sit-Stand Transfer    Sit-Stand [place] (Transfers) verbal cues;contact guard  -     Assistive Device (Sit-Stand Transfers) walker, front-wheeled  -[facility] Name 04/06/24 1437          Stand-Sit Transfer    Stand-Sit [place] (Transfers) verbal cues;contact guard  -     Assistive Device (Stand-Sit Transfers) walker, front-wheeled  -[facility] Name 04/06/24 1437          Gait/Stairs (Locomotion)    [place] Level (Gait) verbal cues;contact guard  -     Assistive Device (Gait) walker, front-wheeled  -     Distance in Feet (Gait) 50  50 x 2  -[facility] Name              Wound 06/15/23 0102 Left anterior plantar    Wound - Properties Group Placement Date: 06/15/23  -SM Placement Time: 0102 -SM Side: Left  -SM Orientation: anterior  -SM Location: plantar  -SM    Retired Wound - Properties Group Placement Date: 06/15/23  -SM Placement Time: 0102 -SM Side: Left  -SM Orientation: anterior  -SM Location: plantar  -SM    Retired Wound - Properties Group Date first assessed: 06/15/23  -SM Time first assessed: 0102 -SM Side: Left  -SM Location: plantar  -SM      Row Name             Wound 08/22/23 0140 Left posterior plantar    Wound - Properties Group Placement Date: 08/22/23  -AM Placement Time: 0140  -AM Present on Original Admission: Y  -AM Side: Left  -AM Orientation: posterior  -AM Location: plantar  -AM    Retired Wound - Properties Group Placement Date: 08/22/23  -AM Placement Time: 0140  -AM Present on Original Admission: Y  -AM Side: Left  -AM Orientation: posterior  -AM Location: plantar  -AM    Retired Wound - Properties Group Date first assessed: 08/22/23  -AM Time first assessed: 0140  -AM Present on Original Admission: Y  -AM Side: Left  -AM Location: plantar  -AM      Row Name             Wound Left lateral foot Diabetic Ulcer    Wound - Properties Group Side: Left  -MH Orientation: lateral  -MH Location: foot  -JACIEL, 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;diabetic foot ulcer/gangrene  Primary Wound Type: Diabetic ulc  -JACIEL Wound Outcome: Amputation  -JACIEL Stage, Pressure Injury : other (see comments)  -JACIEL, full thickness starting 10/20/21     Retired Wound - Properties Group Side: Left  -MH Orientation: lateral  -MH Location: foot  -JACIEL, 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;diabetic foot ulcer/gangrene  Primary Wound Type: Diabetic ulc  -JACIEL Stage, Pressure Injury : other (see comments)  -JACIEL, full thickness starting 10/20/21  Wound Outcome: Amputation  -JACIEL    Retired Wound - Properties Group Side: Left  -MH Location: foot  -JACIEL, 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;diabetic foot ulcer/gangrene  Primary Wound Type:  Diabetic ulc  -JACIEL Wound Outcome: Amputation  -JACIEL      Row Name             Wound 04/04/24 2100 Left posterior wrist Skin Tear    Wound - Properties Group Placement Date: 04/04/24  -NR Placement Time: 2100 -NR Present on Original Admission: N  -NR Side: Left  -NR Orientation: posterior  -NR Location: wrist  -NR Primary Wound Type: Skin tear  -NR Additional Comments: likely caused by wristband. Band removed, site cleaned with alcohol swab, wraped in gauze  -NR    Retired Wound - Properties Group Placement Date: 04/04/24  -NR Placement Time: 2100  -NR Present on Original Admission: N  -NR Side: Left  -NR Orientation: posterior  -NR Location: wrist  -NR Primary Wound Type: Skin tear  -NR Additional Comments: likely caused by wristband. Band removed, site cleaned with alcohol swab, wraped in gauze  -NR    Retired Wound - Properties Group Date first assessed: 04/04/24  -NR Time first assessed: 2100 -NR Present on Original Admission: N  -NR Side: Left  -NR Location: wrist  -NR Primary Wound Type: Skin tear  -NR Additional Comments: likely caused by wristband. Band removed, site cleaned with alcohol swab, wraped in gauze  -NR      Row Name 04/06/24 1437          Positioning and Restraints    Pre-Treatment Position sitting in chair/recliner  -     Post Treatment Position chair  -     In Chair reclined;call light within reach;encouraged to call for assist;with other staff  -               User Key  (r) = Recorded By, (t) = Taken By, (c) = Cosigned By      Initials Name Provider Type     Chery Rosado, PARUL Physical Therapist Assistant    Yuliana Lisa RN Registered Nurse    MH Haase, Mallory L, RN Registered Nurse    Faviola Kunz RN Registered Nurse    Michelle Diaz RN Registered Nurse    Reuben Johnson RN Registered Nurse                    Physical Therapy Education       Title: PT OT SLP Therapies (In Progress)       Topic: Physical Therapy (In Progress)       Point: Mobility  training (Done)       Learning Progress Summary             Patient Acceptance, E,TB,D, VU,NR by  at 3/29/2024 1009    Comment: education re: purpose of PT/importance of activity, safety/falls prevention, NWB L LE, improved tech w/ bed mobility, positioning for pressure relief                         Point: Home exercise program (Not Started)       Learner Progress:  Not documented in this visit.              Point: Precautions (Done)       Learning Progress Summary             Patient Acceptance, E,TB,D, VU,NR by  at 3/29/2024 1009    Comment: education re: purpose of PT/importance of activity, safety/falls prevention, NWB L LE, improved tech w/ bed mobility, positioning for pressure relief                                         User Key       Initials Effective Dates Name Provider Type Discipline     08/02/18 -  Melly Mustafa, PT Physical Therapist PT                  PT Recommendation and Plan     Plan of Care Reviewed With: patient  Progress: no change  Outcome Evaluation: pt restless, wanting to go home, dong L CAM boot, sit-stand cga-sba, pt amb 50 feet rwx cga, trans to chair cga, BLE AROM 10 x 2 reps, cues to stay on task   Outcome Measures       Row Name 04/06/24 1400 04/05/24 1400 04/04/24 0900       How much help from another person do you currently need...    Turning from your back to your side while in flat bed without using bedrails? 4  -AH 4  -KJ 4  -AH    Moving from lying on back to sitting on the side of a flat bed without bedrails? 4  -AH 4  -KJ 4  -AH    Moving to and from a bed to a chair (including a wheelchair)? 3  -AH 3  -KJ 4  -AH    Standing up from a chair using your arms (e.g., wheelchair, bedside chair)? 3  -AH 3  -KJ 4  -AH    Climbing 3-5 steps with a railing? 3  -AH 3  -KJ 3  -AH    To walk in hospital room? 3  - 3  -KJ 3  -AH    AM-PAC 6 Clicks Score (PT) 20  -AH 20  -KJ 22  -AH    Highest Level of Mobility Goal 6 --> Walk 10 steps or more  -AH 6 --> Walk 10 steps or more   -ANDRES 7 --> Walk 25 feet or more  -       Functional Assessment    Outcome Measure Options AM-PAC 6 Clicks Basic Mobility (PT)  - AM-PAC 6 Clicks Basic Mobility (PT)  -KJ AM-PAC 6 Clicks Basic Mobility (PT)  -              User Key  (r) = Recorded By, (t) = Taken By, (c) = Cosigned By      Initials Name Provider Type     Chery Rosado, PARUL Physical Therapist Assistant    Claudia Maddox, PARUL Physical Therapist Assistant                     Time Calculation:    PT Charges       Row Name 04/06/24 1452             Time Calculation    Start Time 1437  -      Stop Time 1452  -      Time Calculation (min) 15 min  -      PT Received On 04/06/24  -         Time Calculation- PT    Total Timed Code Minutes- PT 15 minute(s)  -         Timed Charges    29724 - Gait Training Minutes  15  -AH         Total Minutes    Timed Charges Total Minutes 15  -       Total Minutes 15  -                User Key  (r) = Recorded By, (t) = Taken By, (c) = Cosigned By      Initials Name Provider Type     Chery Rosado PTA Physical Therapist Assistant                  Therapy Charges for Today       Code Description Service Date Service Provider Modifiers Qty    [id number] HC GAIT TRAINING EA 15 MIN 4/6/2024 Chery Rosado PTA GP 1            PT G-Codes  Outcome Measure Options: AM-PAC 6 Clicks Basic Mobility (PT)  AM-PAC 6 Clicks Score (PT): 20  AM-PAC 6 Clicks Score (OT): 16    Chery Rosado PTA  4/6/2024

## 2024-04-06 NOTE — PROGRESS NOTES
Was notified by the nurse that the patient had a large bowel movement with more red rectal bleeding.  Will therefore prep the patient for a colonoscopy for tomorrow.  The patient should have clear liquids today then n.p.o. after midnight except medications with sips of water for colonoscopy tomorrow.  Will also obtain a nuclear tagged red blood cell scan.  If the patient should bleed further would recommend transfer for interventional radiology evaluation/mesenteric arteriography.  Avoid aspirin and nonsteroidal anti-inflammatory drugs.  Agree with serial CBCs and blood transfusions as needed per the primary/referring service.  Further recommendations will be made based on the results of the above studies and the patient's clinical course.  Thank you

## 2024-04-06 NOTE — CONSULTS
"        [facility] Gastroenterology  Inpatient Consult Note  Today's date:  04/06/24    Erick Luong  1955       Referring Provider: No ref. provider found  Primary Physician: Del Shetty MD   Primary Gastroenterologist: Unknown    Date of Admission: 3/26/2024  Date of Service:  04/06/24    Reason for Consultation/Chief Complaint: Rectal bleeding    History of present illness: 68-year-old patient with multiple medical problems.  He is in the hospital for diabetic foot infection.  He had 1 episode of red rectal bleeding yesterday.  His hemoglobin was 10.9 and is now 9.5.  There has been no further rectal bleeding today or last night per the nursing staff.  The patient was on aspirin and Eliquis which are now on hold.  He did have an upper endoscopy in 2021 which showed esophagitis and a colonoscopy in February 2019 showed a suspected \"infectious colitis\".  He does have chronic constipation.  He is eating his breakfast today and is on a solid diet and tolerating.  He denies abdominal pain, nausea, vomiting, hematemesis, melena, fevers, chills, diarrhea or constipation.  The patient was sitting up in a chair and eating breakfast.    Past Medical History:   Diagnosis Date    Arthritis     Autonomic disease     CAD (coronary artery disease) 02/06/2017    Cervical radiculopathy 09/16/2021    Chronic constipation with acute exaccerbation 05/10/2021    Coronary artery disease     Degeneration of cervical intervertebral disc 08/11/2021    Diabetes mellitus     Diabetic foot ulcer 08/31/2020    Diabetic polyneuropathy associated with type 2 diabetes mellitus 01/18/2021    Elevated cholesterol     Gastroesophageal reflux disease 05/13/2019    Headache     HTN (hypertension), benign 05/03/2017    Hyperlipidemia     Hypertension     Mixed hyperlipidemia 02/07/2017    Multiple lung nodules 01/26/2020    5mm, 9 mm RLL identified 1/2020, not present 10/2019.    Myocardial infarction     Osteomyelitis 01/22/2020    " "Osteomyelitis of fifth toe of right foot 10/07/2019    Pancreatitis     Persistent insomnia 01/20/2020    Renal disorder     Sleep apnea 02/06/2017    Sleep apnea with use of continuous positive airway pressure (CPAP)     NON-COMPLIANT    Slow transit constipation 01/16/2019    Spinal stenosis in cervical region 09/16/2021    Vitamin D deficiency 03/02/2021       Past Surgical History:   Procedure Laterality Date    ABDOMINAL SURGERY      AMPUTATION FOOT / TOE Left 10/2021    5th digit     ANTERIOR CERVICAL DISCECTOMY W/ FUSION N/A 8/5/2022    Procedure: CERVICAL DISCECTOMY ANTERIOR WITH FUSION C5-6 with possible plating of C5-7 with neuromonitoring and 1 c-arm;  Surgeon: Karel Soliz MD;  Location:  [facility] OR;  Service: Neurosurgery;  Laterality: N/A;    APPENDECTOMY      BACK SURGERY      CARDIAC CATHETERIZATION Left 02/08/2021    Procedure: Left Heart Cath w poss intervention left anatomical snuff box acess;  Surgeon: Omkar Charles DO;  Location:  [facility] CATH INVASIVE LOCATION;  Service: Cardiology;  Laterality: Left;    CARDIAC SURGERY      CATARACT EXTRACTION      CERVICAL SPINE SURGERY      COLONOSCOPY N/A 01/31/2017    Normal exam repeat in 5 years    COLONOSCOPY N/A 02/11/2019    Mild acute inflammation    COLONOSCOPY W/ POLYPECTOMY  03/04/2014    Hyperplastic polyp    CORONARY ARTERY BYPASS GRAFT  10/2015    ENDOSCOPY  04/13/2011    Gastritis with hemorrhage    ENDOSCOPY N/A 05/05/2017    Normal exam    ENDOSCOPY N/A 02/11/2019    Gastritis    ENDOSCOPY N/A 09/01/2020    Non-erosive gastritis with hemorrhage    ENDOSCOPY N/A 02/10/2021    Esophagitis    FOOT SURGERY Left     INCISION AND DRAINAGE OF WOUND Left 09/2007    spider bite        Allergies   Allergen Reactions    Cefepime Hives and Anaphylaxis    Bactrim [Sulfamethoxazole-Trimethoprim] Other (See Comments)     \"RENAL FAILURE\"    Vancomycin Itching    Zolpidem Unknown - High Severity     \"makes him crazy\"    Zolpidem Tartrate " Unknown - Low Severity and Provider Review Needed    Metronidazole Rash       Medications Prior to Admission   Medication Sig Dispense Refill Last Dose    amitriptyline (ELAVIL) 25 MG tablet Take 2 tablets by mouth Daily at bedtime. (Patient not taking: Reported on 3/27/2024) 60 tablet 1 Not Taking    apixaban (ELIQUIS) 5 MG tablet tablet Take 1 tablet by mouth Every 12 (Twelve) Hours.       ascorbic acid (VITAMIN C) 1000 MG tablet Take 1 tablet by mouth Daily. 30 tablet 3     Aspirin 81 MG capsule Take 81 mg by mouth Daily.       bumetanide (BUMEX) 1 MG tablet Take 1 tablet every day by oral route for 30 days. 30 tablet 0     bumetanide (BUMEX) 2 MG tablet Take 1 tablet by mouth Daily. Take 2 tablets in morning;1 tablet at night (Patient not taking: Reported on 3/27/2024)   Not Taking    busPIRone (BUSPAR) 10 MG tablet Take 1 tablet by mouth 3 (Three) Times a Day.       calcitriol (ROCALTROL) 0.5 MCG capsule Take 1 capsule by mouth Daily. 90 capsule 4     calcitriol (ROCALTROL) 0.5 MCG capsule Take 1 capsule every day by oral route for 90 days. (Patient not taking: Reported on 3/27/2024) 90 capsule 4 Not Taking    carvedilol (COREG) 3.125 MG tablet Take 1 tablet twice a day by oral route. 60 tablet 4     carvedilol (COREG) 6.25 MG tablet Take 1 tablet by mouth 2 (Two) Times a Day. (Patient not taking: Reported on 3/27/2024) 180 tablet 4 Not Taking    Cholecalciferol (D-5000) 125 MCG (5000 UT) tablet Take 1 tablet by mouth Daily Before Lunch. 30 tablet 2     cloNIDine (CATAPRES) 0.1 MG tablet Take 1 tablet by mouth Every 12 (Twelve) Hours. (Patient not taking: Reported on 3/27/2024) 60 tablet 2 Not Taking    Diclofenac Sodium (VOLTAREN) 1 % gel gel Apply 2 g topically to the appropriate area as directed 4 (Four) Times a Day As Needed. 300 g 11     Diclofenac Sodium (VOLTAREN) 1 % gel gel Apply 2 g topically to the appropriate area as directed 4 (Four) Times a Day. (Patient not taking: Reported on 3/27/2024) 300 g 11  Not Taking    donepezil (ARICEPT) 10 MG tablet Take 1 tablet by mouth Daily. (Patient not taking: Reported on 3/27/2024) 90 tablet 4 Not Taking    Dulaglutide (Trulicity) 4.5 MG/0.5ML solution pen-injector Inject 0.5 mL under the skin into the appropriate area as directed 1 (One) Time Per Week. (Patient not taking: Reported on 3/27/2024) 2 mL 11 Not Taking    DULoxetine (CYMBALTA) 60 MG capsule Take 1 capsule by mouth Daily. 90 capsule 4     DULoxetine (CYMBALTA) 60 MG capsule Take 1 capsule by mouth Daily. (Patient not taking: Reported on 3/27/2024) 90 capsule 4 Not Taking    DULoxetine (CYMBALTA) 60 MG capsule Take 1 capsule by mouth Daily. (Patient not taking: Reported on 3/27/2024) 90 capsule 4 Not Taking    empagliflozin (JARDIANCE) 25 MG tablet tablet Take 1 tablet by mouth Daily. (Patient not taking: Reported on 3/27/2024) 30 tablet 2 Not Taking    famotidine (PEPCID) 20 MG tablet Take 1 tablet twice a day by oral route. 180 tablet 4     fluticasone (FLONASE) 50 MCG/ACT nasal spray 1 spray into the nostril(s) as directed by provider Daily. (Patient taking differently: 1 spray into the nostril(s) as directed by provider 2 (Two) Times a Day.) 16 g 1     HYDROcodone-acetaminophen (NORCO) 7.5-325 MG per tablet Take 1 tablet by mouth 2 (Two) Times a Day As Needed. (Patient not taking: Reported on 3/27/2024) 40 tablet 0 Not Taking    Insulin Glargine (Lantus SoloStar) 100 UNIT/ML injection pen Inject 20 Units under the skin into the appropriate area as directed Every Evening. 15 mL 3     Insulin Regular Human, Conc, (HumuLIN R U-500 KwikPen) 500 UNIT/ML solution pen-injector CONCENTRATED injection Inject 120 Units under the skin into the appropriate area as directed 2 (Two) Times a Day with breakfast and dinner. (Patient not taking: Reported on 3/27/2024) 18 mL 5 Not Taking    Insulin Regular Human, Conc, (HumuLIN R U-500 KwikPen) 500 UNIT/ML solution pen-injector CONCENTRATED injection Inject 40 Units under the  skin into the appropriate area with regular meals AND 40 Units with large meals. 54 mL 3     isosorbide mononitrate (IMDUR) 30 MG 24 hr tablet Take 1 tablet by mouth Daily.       magnesium hydroxide (Milk of Magnesia) 400 MG/5ML suspension Take 30 mL every day by oral route for 30 days. 900 mL 0     magnesium hydroxide (Milk of Magnesia) 400 MG/5ML suspension Take 30mL by mouth once daily for 30 days. (Patient not taking: Reported on 3/27/2024) 900 mL 0 Not Taking    melatonin 3 MG tablet Take 1 tablet by mouth At Night As Needed for Sleep.       methylcellulose (Citrucel) oral powder Mix 5g as directed and drink twice daily for 90 days. 900 g 10     metoclopramide (REGLAN) 5 MG tablet Take 1 tablet by mouth 3 times a day.       midodrine (PROAMATINE) 2.5 MG tablet Take 1 tablet by mouth 3 (Three) Times a Day Before Meals.       nitroglycerin (NITROSTAT) 0.4 MG SL tablet Dissolve 1 tablet by mouth every 5 minutes as needed for chest pain; MAX 3 tabs/24 hours.  If no improvement after 3 tabs go to ER 25 tablet 0     ondansetron (ZOFRAN) 4 MG tablet Take 1 tablet by mouth Every 6 (Six) Hours As Needed for Nausea or Vomiting.       oxyCODONE (ROXICODONE) 10 MG tablet Take 1 tablet by mouth twice a day as needed 60 tablet 0     pantoprazole (Protonix) 40 MG EC tablet Take 1 tablet by mouth 2 (Two) Times a Day. (Patient not taking: Reported on 3/27/2024) 180 tablet 4 Not Taking    polyethylene glycol (MIRALAX) 17 g packet Take 17 g by mouth Daily. Obtain OTC       prazosin (MINIPRESS) 1 MG capsule Take 1 capsule by mouth every night at bedtime. 30 capsule 2     pregabalin (LYRICA) 100 MG capsule Take 2 capsules by mouth Every Night.       pregabalin (LYRICA) 50 MG capsule Take 1 capsule by mouth Daily.       rosuvastatin (CRESTOR) 10 MG tablet Take 1 tablet by mouth Daily.       sennosides-docusate (PERICOLACE) 8.6-50 MG per tablet Take 1 tablet by mouth Every Night. Obtain OTC       sennosides-docusate (PERICOLACE)  "8.6-50 MG per tablet Take 1 tablet by mouth every night at bedtime. (Patient not taking: Reported on 3/27/2024) 30 tablet 2 Not Taking    sodium hypochlorite (DAKIN'S 1/4 STRENGTH) 0.125 % solution topical solution 0.125% Apply to affected area twice daily (Patient not taking: Reported on 3/27/2024) 473 mL 3 Not Taking    sodium hypochlorite (DAKIN'S 1/4 STRENGTH) 0.125 % solution topical solution 0.125% Apply to affected area twice daily as directed (Patient not taking: Reported on 3/27/2024) 473 mL 5 Not Taking    sodium hypochlorite (DAKIN'S) 0.25 % topical solution Use to wound daily as instructed 473 mL 1     sucralfate (CARAFATE) 1 g tablet Take 1 tablet by mouth 3 times a day.       tamsulosin (FLOMAX) 0.4 MG capsule 24 hr capsule Take 1 capsule by mouth Daily. 90 capsule 1     timolol (TIMOPTIC) 0.5 % ophthalmic solution Administer 1 drop to both eyes 2 (Two) Times a Day.       valsartan (DIOVAN) 40 MG tablet Take 1 tablet by mouth Daily.       zinc sulfate (ZINCATE) 50 MG capsule Take 1 capsule by mouth Daily.          Hospital Medications (active)         Dose Frequency Start End    acetaminophen (TYLENOL) 160 MG/5ML oral solution 650 mg 650 mg Every 4 Hours PRN 3/26/2024 --    Admin Instructions: If given for fever, use fever parameter: fever greater than 100.4 °F  Based on patient request - if ordered for moderate or severe pain, provider allows for administration of a medication prescribed for a lower pain scale.    Do not exceed 4 grams of acetaminophen in a 24 hr period. Max dose of 2gm for AST/ALT greater than 120 units/L.    If given for pain, use the following pain scale:   Mild Pain = Pain Score of 1-3, CPOT 1-2  Moderate Pain = Pain Score of 4-6, CPOT 3-4  Severe Pain = Pain Score of 7-10, CPOT 5-8    Route: Oral    Linked Group 1: Placed in \"Or\" Linked Group        acetaminophen (TYLENOL) suppository 650 mg 650 mg Every 4 Hours PRN 3/26/2024 --    Admin Instructions: If given for fever, use " "fever parameter: fever greater than 100.4 °F  Based on patient request - if ordered for moderate or severe pain, provider allows for administration of a medication prescribed for a lower pain scale.    Do not exceed 4 grams of acetaminophen in a 24 hr period. Max dose of 2gm for AST/ALT greater than 120 units/L.    If given for pain, use the following pain scale:   Mild Pain = Pain Score of 1-3, CPOT 1-2  Moderate Pain = Pain Score of 4-6, CPOT 3-4  Severe Pain = Pain Score of 7-10, CPOT 5-8    Route: Rectal    Linked Group 1: Placed in \"Or\" Linked Group        acetaminophen (TYLENOL) tablet 650 mg 650 mg Every 4 Hours PRN 3/26/2024 --    Admin Instructions: If given for fever, use fever parameter: fever greater than 100.4 °F  Based on patient request - if ordered for moderate or severe pain, provider allows for administration of a medication prescribed for a lower pain scale.    Do not exceed 4 grams of acetaminophen in a 24 hr period. Max dose of 2gm for AST/ALT greater than 120 units/L.    If given for pain, use the following pain scale:   Mild Pain = Pain Score of 1-3, CPOT 1-2  Moderate Pain = Pain Score of 4-6, CPOT 3-4  Severe Pain = Pain Score of 7-10, CPOT 5-8    Route: Oral    Linked Group 1: Placed in \"Or\" Linked Group        apixaban (ELIQUIS) tablet 5 mg ([Held by provider] since 4/5/2024  4:47 PM) 5 mg Every 12 Hours Scheduled 3/26/2024 --    Admin Instructions: Tablet may be crushed and suspended in 60 mL of water or D5W and immediately delivered via NG tube.    Route: Oral    ascorbic acid (VITAMIN C) tablet 1,000 mg 1,000 mg Daily 3/28/2024 --    Route: Oral    aspirin EC tablet 81 mg ([Held by provider] since 4/5/2024  4:47 PM) 81 mg Daily 3/28/2024 --    Admin Instructions: Do not crush or chew the capsules or tablets. The drug may not work as designed if the capsule or tablet is crushed or chewed. Swallow whole.  Do not exceed 4 grams of aspirin in a 24 hr period.    If given for pain, use the " "following pain scale:   Mild Pain = Pain Score of 1-3, CPOT 1-2  Moderate Pain = Pain Score of 4-6, CPOT 3-4  Severe Pain = Pain Score of 7-10, CPOT 5-8    Route: Oral    bisacodyl (DULCOLAX) EC tablet 5 mg 5 mg Daily PRN 4/4/2024 --    Admin Instructions: Use if no bowel movement after 12 hours.  Swallow whole. Do not crush, split, or chew tablet.    Route: Oral    Linked Group 2: Placed in \"And\" Linked Group        bisacodyl (DULCOLAX) suppository 10 mg 10 mg Daily PRN 4/4/2024 --    Admin Instructions: Use if no bowel movement after 12 hours.  Hold for diarrhea    Route: Rectal    Linked Group 2: Placed in \"And\" Linked Group        bumetanide (BUMEX) tablet 1 mg ([Held by provider] since 4/5/2024  9:35 AM) 1 mg Daily 4/3/2024 --    Admin Instructions: Hold for SBP less than 100, DBP less than 60.  Take with food if GI upset occurs.    Route: Oral    carvedilol (COREG) tablet 3.125 mg 3.125 mg 2 Times Daily With Meals 3/28/2024 --    Admin Instructions: Hold for SBP less than 100, DBP less than 60, or heart rate less than 50. If a dose is held, please contact the provider.  Give with food.    Route: Oral    dextrose (D50W) (25 g/50 mL) IV injection 25 g 25 g Every 15 Minutes PRN 3/26/2024 --    Admin Instructions: Blood sugar less than 70; patient has IV access - Unresponsive, NPO or Unable To Safely Swallow    Route: Intravenous    dextrose (GLUTOSE) oral gel 15 g 15 g Every 15 Minutes PRN 3/26/2024 --    Admin Instructions: BS<70, Patient Alert, Is not NPO, Can safely swallow.    Route: Oral    Diclofenac Sodium (VOLTAREN) 1 % gel 2 g 2 g 4 Times Daily PRN 3/28/2024 --    Admin Instructions: Apply to shoulders and knees  Unknown do not cover area with occlusive dressing or apply any other med to affected area. Do not wash affected area for 1 hr after applying. Use dosing card for correct dose measurement.    Route: Topical    dicyclomine (BENTYL) capsule 20 mg 20 mg 3 Times Daily PRN 3/31/2024 --    Route: Oral "    donepezil (ARICEPT) tablet 10 mg 10 mg Daily 3/27/2024 --    Route: Oral    glucagon (GLUCAGEN) injection 1 mg 1 mg Every 15 Minutes PRN 3/26/2024 --    Admin Instructions: Blood Glucose Less Than 70 - Patient Without IV Access - Unresponsive, NPO or Unable To Safely Swallow  Reconstitute powder for injection by adding 1 mL of -supplied sterile diluent or sterile water for injection to a vial containing 1 mg of the drug, to provide solutions containing 1 mg/mL. Shake vial gently to dissolve.    Route: Intramuscular    haloperidol lactate (HALDOL) injection 5 mg 5 mg Every 6 Hours PRN 3/27/2024 --    Admin Instructions: For IV Push, administer no faster than 5 mg / minute.    Route: Intravenous    insulin detemir (LEVEMIR) injection 30 Units 30 Units Nightly 3/26/2024 --    Admin Instructions: Do not hold basal insulin without an order. Consider requesting a dose edit, if needed.  Unknown    Route: Subcutaneous    Insulin Lispro (humaLOG) injection 4-24 Units 4-24 Units 4 Times Daily Before Meals & Nightly 3/26/2024 --    Admin Instructions: Correction Insulin - High Dose (Total Insulin Dose Greater Than 80 units/day, Insulin Resistant Patient, Patient With Infection, Steroid Treatment)    Blood Glucose 150-199 mg/dL - 4 units  Blood Glucose 200-249 mg/dL - 8 units  Blood Glucose 250-299 mg/dL - 12 units  Blood Glucose 300-349 mg/dL - 16 units  Blood Glucose 350-400 mg/dL - 20 units  Blood Glucose Greater Than 400 mg/dL - 24 units & Call Provider  Unknown caution: Look alike/sound alike drug alert unknown    Route: Subcutaneous    insulin regular (humuLIN R,novoLIN R) injection 10 Units 10 Units 3 Times Daily Before Meals 3/26/2024 --    Admin Instructions: Unknown caution: Look alike/sound alike drug alert unknown    Route: Subcutaneous    isosorbide mononitrate (IMDUR) 24 hr tablet 30 mg 30 mg Every 24 Hours Scheduled 3/28/2024 --    Admin Instructions: Do not crush or chew the capsules or tablets.  The drug may not work as designed if the capsule or tablet is crushed or chewed. Swallow whole.    Route: Oral    lactulose solution 20 g 20 g 3 Times Daily 4/3/2024 --    Route: Oral    linezolid (ZYVOX) tablet 600 mg 600 mg Every 12 Hours Scheduled 2024    Route: Oral    magnesium hydroxide (MILK OF MAGNESIA) suspension 10 mL 10 mL Daily PRN 3/28/2024 --    Route: Oral    melatonin tablet 6 mg 6 mg Nightly 3/27/2024 --    Route: Oral    midodrine (PROAMATINE) tablet 2.5 mg 2.5 mg 3 Times Daily Before Meals 3/28/2024 --    Route: Oral    Morphine sulfate (PF) injection 2 mg 2 mg Every 3 Hours PRN 3/31/2024 2024    Admin Instructions: Based on patient request - if ordered for moderate or severe pain, provider allows for administration of a medication prescribed for a lower pain scale.  Unknown    Caution: Look alike/sound alike drug alert    If given for pain, use the following pain scale:  Mild Pain = Pain Score of 1-3, CPOT 1-2  Moderate Pain = Pain Score of 4-6, CPOT 3-4  Severe Pain = Pain Score of 7-10, CPOT 5-8    Route: Intravenous    mupirocin (BACTROBAN) 2 % nasal ointment 1 Application 1 Application 2 Times Daily 3/28/2024 --    Admin Instructions: Apply ½ tube (smaller amount may be sufficient for pediatric/ patients) in each nare. Press sides of nose together and gently massage for 1 minute to spread the ointment throughout the inside of the nostrils  Unknown    Route: Each Nare    nitroglycerin (NITROSTAT) SL tablet 0.4 mg 0.4 mg Every 5 Minutes PRN 3/26/2024 --    Admin Instructions: If Pain Unrelieved After 3 Doses Notify MD  May administer up to 3 doses per episode.    Route: Sublingual    ondansetron (ZOFRAN) injection 4 mg 4 mg Every 6 Hours PRN 3/26/2024 --    Admin Instructions: If BOTH ondansetron (ZOFRAN) and promethazine (PHENERGAN) are ordered use ondansetron first and THEN promethazine IF ondansetron is ineffective.    Route: Intravenous    Linked Group 3: Placed in  "\"Or\" Linked Group        ondansetron ODT (ZOFRAN-ODT) disintegrating tablet 4 mg 4 mg Every 6 Hours PRN 3/26/2024 --    Admin Instructions: If BOTH ondansetron (ZOFRAN) and promethazine (PHENERGAN) are ordered use ondansetron first and THEN promethazine IF ondansetron is ineffective.  Place on tongue and allow to dissolve.    Route: Oral    Linked Group 3: Placed in \"Or\" Linked Group        oxyCODONE (ROXICODONE) immediate release tablet 10 mg 10 mg 2 Times Daily PRN 3/26/2024 --    Admin Instructions: Based on patient request - if ordered for moderate or severe pain, provider allows for administration of a medication prescribed for a lower pain scale.  Unknown    If given for pain, use the following pain scale:  Mild Pain = Pain Score of 1-3, CPOT 1-2  Moderate Pain = Pain Score of 4-6, CPOT 3-4  Severe Pain = Pain Score of 7-10, CPOT 5-8    Route: Oral    pantoprazole (PROTONIX) injection 40 mg 40 mg Every 12 Hours Scheduled 4/5/2024 --    Admin Instructions: Dilute with 10 mL of 0.9% NaCl and give IV push over 2 minutes.    Route: Intravenous    polyethylene glycol (MIRALAX) packet 17 g 17 g Daily 4/4/2024 --    Admin Instructions: Use if no bowel movement after 12 hours. Mix in 6-8 ounces of water.  Use 4-8 ounces of water, tea, or juice for each 17 gram dose.    Route: Oral    Linked Group 2: Placed in \"And\" Linked Group        pregabalin (LYRICA) capsule 100 mg 100 mg Nightly 3/28/2024 --    Admin Instructions: Unknown    Route: Oral    pregabalin (LYRICA) capsule 50 mg 50 mg Daily 3/28/2024 --    Admin Instructions: Unknown    Route: Oral    rosuvastatin (CRESTOR) tablet 10 mg 10 mg Nightly 3/28/2024 --    Admin Instructions: Avoid grapefruit juice.    Route: Oral    sennosides-docusate (PERICOLACE) 8.6-50 MG per tablet 1 tablet 1 tablet 2 Times Daily 4/4/2024 --    Admin Instructions: HOLD MEDICATION IF PATIENT HAS HAD BOWEL MOVEMENT. Start bowel management regimen if patient has not had a bowel movement " "after 12 hours.    Route: Oral    Linked Group 2: Placed in \"And\" Linked Group        sodium chloride 0.9 % flush 10 mL 10 mL As Needed 3/26/2024 --    Route: Intravenous    sodium chloride 0.9 % flush 10 mL 10 mL Every 12 Hours Scheduled 3/26/2024 --    Route: Intravenous    sodium chloride 0.9 % flush 10 mL 10 mL As Needed 3/26/2024 --    Route: Intravenous    sodium chloride 0.9 % infusion 40 mL 40 mL As Needed 3/26/2024 --    Admin Instructions: Following administration of an IV intermittent medication, flush line with 40mL NS at 100mL/hr.    Route: Intravenous    sucralfate (CARAFATE) 1 GM/10ML suspension 1 g 1 g 3 Times Daily Before Meals 3/28/2024 --    Route: Oral    tamsulosin (FLOMAX) 24 hr capsule 0.4 mg 0.4 mg Daily 3/28/2024 --    Admin Instructions: Do not crush or chew the capsules or tablets. The drug may not work as designed if the capsule or tablet is crushed or chewed. Swallow whole.    Route: Oral    timolol (TIMOPTIC) 0.25 % ophthalmic solution 1 drop 1 drop Every 12 Hours Scheduled 3/28/2024 --    Route: Both Eyes    zinc sulfate (ZINCATE) capsule 220 mg 220 mg Daily 3/28/2024 --    Route: Oral            Social History     Tobacco Use    Smoking status: Former     Current packs/day: 0.00     Types: Cigarettes     Quit date:      Years since quittin.2    Smokeless tobacco: Never    Tobacco comments:     [place]   Substance Use Topics    Alcohol use: No        Past Family History:  Family History   Problem Relation Age of Onset    Colon cancer Father     Heart disease Father     Colon cancer Sister     Colon polyps Sister     Alzheimer's disease Mother     Coronary artery disease Sister     Coronary artery disease Sister        Review of Systems:  Constitutional: No unexpected weight change, no fatigue, no unexplained fever, no sweats or chills.   HEENT: No icteric sclera.  No hearing or visual deficits.  No sore throat.  No chronic nasal discharge.  Pulmonary: No chronic " cough.  No hemoptysis.  No shortness of breath.  Cardiovascular: No chest pain.  No palpitations.  No shortness of breath.  Gastrointestinal: As above.  Musculoskeletal/extremities: No peripheral edema.  No cyanosis.  No claudications.  No back pain.  Genitourinary: No dysuria.  No blood in stool.  No urethral discharges.  Neurologic: No seizures.  No headaches.  No dizziness.  No gait problems.  Skin: No rash.  No icterus.  Mental: No psychosis.  No confusions.  No hallucinations.      Physical Exam:  Temp:  [96.9 °F (36.1 °C)-97.8 °F (36.6 °C)] 96.9 °F (36.1 °C)  Heart Rate:  [61-82] 71  Resp:  [16-18] 19  BP: (130-146)/(66-82) 136/66  Body mass index is 39.22 kg/m².  No intake or output data in the 24 hours ending 04/06/24 0941  No intake/output data recorded.    General appearance: Overweight  HEENT: Nonicteric sclerae.  Moist oral mucosa.  PERRLA.  EOMI.  Clear pharynx.  Lungs: Clear to auscultation bilaterally.  No wheezing, rales or rhonchi.  Heart: Regular rate and rhythm.  Normal S1 and S2, no S3, S4 or murmur.  Abdomen: Soft, nondistended, nontender to palpation, with normoactive bowel sounds, no hepatosplenomegaly, no palpable masses.  Extremities: No cyanosis, edema or pulse deficits.  Lower extremity ulceration  Skin: No rash or jaundice.    Results Review:  Lab Results (last 24 hours)       Procedure Component Value Units Date/Time    POC Glucose Once [[id number]]  (Normal) Collected: 04/06/24 0730    Specimen: Blood Updated: 04/06/24 0743     Glucose 100 mg/dL      Comment: : [id number] Aaron Deal ID: [id number]       Blood Culture With DARSHAN - Blood, Hand, Right [[id number]]  (Normal) Collected: 04/01/24 0402    Specimen: Blood from Hand, Right Updated: 04/06/24 0445     Blood Culture No growth at 5 days    Basic Metabolic Panel [[id number]]  (Abnormal) Collected: 04/06/24 0340    Specimen: Blood Updated: 04/06/24 0432     Glucose 86 mg/dL      BUN 33 mg/dL      Creatinine 2.10 mg/dL       Sodium 141 mmol/L      Potassium 4.5 mmol/L      Comment: Specimen hemolyzed.  Result may be falsely elevated.        Chloride 104 mmol/L      CO2 28.0 mmol/L      Calcium 10.7 mg/dL      BUN/Creatinine Ratio 15.7     Anion Gap 9.0 mmol/L      eGFR 33.9 mL/min/1.73     Narrative:      GFR Normal >60  Chronic Kidney Disease <60  Kidney Failure <15      PTH, Intact [[id number]]  (Abnormal) Collected: 04/06/24 0340    Specimen: Blood Updated: 04/06/24 0427     PTH, Intact 7.3 pg/mL     Narrative:      Results may be falsely decreased if patient taking Biotin.      Protime-INR [[id number]]  (Abnormal) Collected: 04/06/24 0340    Specimen: Blood Updated: 04/06/24 0423     Protime 15.0 Seconds      INR 1.13    aPTT [[id number]]  (Abnormal) Collected: 04/06/24 0340    Specimen: Blood Updated: 04/06/24 0423     PTT 39.2 seconds     CBC & Differential [[id number]]  (Abnormal) Collected: 04/06/24 0340    Specimen: Blood Updated: 04/06/24 0411    Narrative:      The following orders were created for panel order CBC & Differential.  Procedure                               Abnormality         Status                     ---------                               -----------         ------                     CBC Auto Differential[[id number]]        Abnormal            Final result                 Please view results for these tests on the individual orders.    CBC Auto Differential [[id number]]  (Abnormal) Collected: 04/06/24 0340    Specimen: Blood Updated: 04/06/24 0411     WBC 5.43 10*3/mm3      RBC 3.37 10*6/mm3      Hemoglobin 9.5 g/dL      Hematocrit 30.2 %      MCV 89.6 fL      MCH 28.2 pg      MCHC 31.5 g/dL      RDW 14.1 %      RDW-SD 45.8 fl      MPV 10.5 fL      Platelets 294 10*3/mm3      Neutrophil % 53.8 %      Lymphocyte % 31.1 %      Monocyte % 9.8 %      Eosinophil % 4.2 %      Basophil % 0.7 %      Immature Grans % 0.4 %      Neutrophils, Absolute 2.92 10*3/mm3      Lymphocytes, Absolute 1.69 10*3/mm3      Monocytes,  Absolute 0.53 10*3/mm3      Eosinophils, Absolute 0.23 10*3/mm3      Basophils, Absolute 0.04 10*3/mm3      Immature Grans, Absolute 0.02 10*3/mm3      nRBC 0.0 /100 WBC     Vitamin D,25-Hydroxy [[id number]] Collected: 04/06/24 0340    Specimen: Blood Updated: 04/06/24 0405    POC Glucose Once [[id number]]  (Abnormal) Collected: 04/05/24 2042    Specimen: Blood Updated: 04/05/24 2053     Glucose 149 mg/dL      Comment: : [id number] Eli Mcphersoneter ID: [id number]       POC Glucose Once [[id number]]  (Abnormal) Collected: 04/05/24 1718    Specimen: Blood Updated: 04/05/24 1729     Glucose 160 mg/dL      Comment: : [id number] Severiano RuthahMeter ID: [id number]       POC Glucose Once [[id number]]  (Abnormal) Collected: 04/05/24 1142    Specimen: Blood Updated: 04/05/24 1152     Glucose 239 mg/dL      Comment: : [id number] العلي SarahMeter ID: [id number]       Magnesium [[id number]]  (Normal) Collected: 04/05/24 0512    Specimen: Blood Updated: 04/05/24 0954     Magnesium 2.3 mg/dL     Comprehensive Metabolic Panel [[id number]]  (Abnormal) Collected: 04/05/24 0512    Specimen: Blood Updated: 04/05/24 0954     Glucose 173 mg/dL      BUN 31 mg/dL      Creatinine 1.98 mg/dL      Sodium 139 mmol/L      Potassium 4.4 mmol/L      Chloride 102 mmol/L      CO2 25.0 mmol/L      Calcium 11.1 mg/dL      Total Protein 7.2 g/dL      Albumin 3.8 g/dL      ALT (SGPT) 18 U/L      AST (SGOT) 22 U/L      Alkaline Phosphatase 138 U/L      Total Bilirubin 0.4 mg/dL      Globulin 3.4 gm/dL      A/G Ratio 1.1 g/dL      BUN/Creatinine Ratio 15.7     Anion Gap 12.0 mmol/L      eGFR 36.3 mL/min/1.73     Narrative:      GFR Normal >60  Chronic Kidney Disease <60  Kidney Failure <15              Radiology Review:  Imaging Results (Last 72 Hours)       Procedure Component Value Units Date/Time    XR Abdomen KUB [[id number]] Collected: 04/03/24 1345     Updated: 04/03/24 1349    Narrative:      EXAMINATION: XR ABDOMEN KUB- 4/3/2024 1:45 PM      HISTORY: abdominal distention; E11.628-Type 2 diabetes mellitus with  other skin complications; L08.9-Local infection of the skin and  subcutaneous tissue, unspecified; E11.65-Type 2 diabetes mellitus with  hyperglycemia; Z74.09-Other reduced mobility.     REPORT: Supine imaging of the abdomen was performed.     COMPARISON: KUB 10/7/2023.     A large amount of stool seen within the transverse colon and flexures,  likely representing constipation. No evidence of bowel obstruction is  identified. There are no definite urinary tract calculi. Cholecystectomy  clips are present. No acute osseous abnormality is identified.       Impression:      Extensive constipation within the transverse colon and  flexures.     This report was signed and finalized on 4/3/2024 1:46 PM by Dr. Percy Cesar MD.               Impression/Plan:    Sepsis    Essential hypertension    Type 2 diabetes mellitus with hyperglycemia, with long-term current use of insulin    Anemia due to chronic kidney disease    Diabetic polyneuropathy associated with type 2 diabetes mellitus    BPH without obstruction/lower urinary tract symptoms    Chronic diastolic heart failure    Chronic kidney disease (CKD), stage IV (severe)    Diabetic foot infection      1 episode of red rectal bleeding, possibly perianal source such as hemorrhoids.  Cannot rule out a more proximal colonic source.  The patient appears to have stopped bleeding presently and is not having any further rectal bleeding.  He is tolerating a regular diet and his aspirin and Eliquis are on hold.  Agree with serial CBCs and blood transfusions as needed.  If the patient should bleed further and his hemoglobin dropped further, would recommend colonoscopy plus or minus upper endoscopy.  The patient should have a colonoscopy at some point which can be done electively if the patient does not have any further rectal bleeding.  He should follow-up with a gastroenterologist within the week after  hospital release to consider outpatient upper endoscopy and colonoscopy.  The above was discussed in detail with the patient and the patient's nursing staff and they appeared to understand and agreed.  Avoid aspirin and nonsteroidal anti-inflammatory drugs if possible.  Further recommendations based on the results of the above studies and the patient's clinical course.  Thank you    Raymond Mederos MD  04/06/24   09:41 CDT    DISCLAIMER:    This physician works through a [employer] as an inpatient consultant gastroenterologist only and has no outpatient clinic for patient follow up.  Any results not available at time of inpatient discharge and/or GI clinic follow up should be managed by the hospitalist team, PCP, or outpatient gastroenterologist.

## 2024-04-06 NOTE — PROGRESS NOTES
[facility] Medicine Services  INPATIENT PROGRESS NOTE    Patient Name: Erick Luong  Date of Admission: 3/26/2024  Today's Date: 04/06/24  Length of Stay: 11  Primary Care Physician: Del Shetty MD    Subjective   Chief Complaint: Altered mental status  Erick Luong is a 68-year-old male with a past medical history of coronary artery disease, diastolic dysfunction followed by Dr. Garay, vascular disease, GERD, diabetes, chronic anemia, chronic nonhealing wound to the left foot followed by [facility] wound care.  Patient had an extended hospitalization 8/2023 due to syncope, subsequent admission to [facility] 9/18 - 10/11, 2023.  During that admission he did undergo debridement of Proteus wound infection.     Patient seen by PCP on 3/11/2024, started on prazosin 1 mg daily, duloxetine 60 mg daily magnesium hydroxide 400 mg daily per office note.  Wife is unaware of exactly what patient is taking.  Will need pharmacy to obtain correct med list.     [facility] performed debridement on the plantar wound treating with Santyl, is also a area on dorsal aspect of the foot.  Foot is erythemic, warm to touch.  Doctor told patient and wife he was doing well.  There was no signs of infection yesterday.  Today patient was brought to emergency department via EMS for altered mental status with disorientation, glucose was noted to be over 500 in the ambulance.  Initial glucose here 400 followed with 438 treated with regular insulin.  I have taken care of this patient many times.  He awakens for questions.  He is somewhat somnolent.  Remainder workup reveals creatinine 1.9 at baseline, CRP 4.3, lactic acid 3.2 and procalcitonin 6.56.  He does have a white count of 14.6, no bandemia.  Initially afebrile however now 100.7, patient meets criteria for sepsis with tachycardia and tachypnea, elevated lactic acid and source of infection.   There is no organ failure.  Patient is admitted for simple sepsis, condition stabl  3/31   Patient is alert and oriented x 3.  He appears to be in good spirits.  He is requesting for discharge.  He was counseled that we will need to wait until later in this week, after we set up IV antibiotics before we can discharge him.    4/1  Patient was admitted to ER on 3/26 with altered mental status. Had debridement of ongoing wound on left foot the day prior. Left foot warm and erythremic on arrival to ER. Noted to have elevated blood glucose over 400. Initial creatinine of 1.9.  [facility] nephrology were consulted for acute on chronic renal failure MRI of the left foot did not show any osteomyelitis or abscess.  [facility] ID patient was found to have MRSA bacteremia-blood cultures + March 26 and March 29.  Secondary to infected left foot with wounds and associated cellulitis on admission patient was started on Zyvox plan is for echo today rule out endocarditis given persistent bacteremia holding off on PICC line pending that   4/2  As before history of coronary disease diabetes chronic diabetic foot ulcer noncompliance with A1c 10.4 presented to us with mental status change was found to have MRSA sepsis bacteremia secondary to his infected foot MRI did not show any osteo or abscess has been on Zyvox echo was ordered to rule out endocarditis for persistent bacteremia, podiatry recommends wound care and follow-up with outpatient wound care appreciate ID Currently plan for at least 2 weeks of linezolid with start date after clearance of blood cultures-currently March 30. Should be able to transition to oral linezolid given increased bioavailability. Duration may change based on echocardiogram findings , [facility] nephrology acute on chronic renal failure secondary to ATN resolving  4/3  Remains on Zyvox blood cultures remains negative echo of the heart could not rule out endocarditis poor quality recommending YANIV if  endocarditis is a concern we will leave this up to the infectious disease attending appreciate nephrology renal function is getting better, per podiatry continue wound care continue antibiotics MRI is showing no evidence of soft tissue abscess or osteomyelitis podiatry will see as outpatient, patient A1c is 10.8 needs much better control of his diabetes and much better follow-up with his family doctor regarding his diabetes, and is having stomach distention and bloating last bowel movement was 3 days ago will do KUB and start him on lactulose  4/4  As above ATN is resolving creatinine down to 1.78, reassuring extensive constipation to account for his abdominal pain was started on lactulose and will go ahead and start him on enemas, appreciate ID continue Zyvox I agree with ID given the lack of persistent bacteremia no need for YANIV continue wound care repeat blood culture remains unremarkable from March 30, awaiting SNF, patient said that he did not have any more bowel movements we will go ahead and start him on enemas awaiting SNF  4/5  Appreciate nephrology will hold Bumex today kidney function is slightly worse appreciate ID remains on Zyvox for MRSA bacteremia no need for YANIV continue wound care family still wants to take him home with home health and not interested in rehab but culture from March 30 and February 1 remains unremarkable, and has a sitter last night  4/6  Patient apparently had a massive GI bleed reported his aspirin and Eliquis on hold started Protonix do H&H and consulted GI globin is relatively stable and for colonoscopy for tomorrow , Will also obtain a nuclear tagged red blood cell scan.   Review of Systems   All pertinent negatives and positives are as above. All other systems have been reviewed and are negative unless otherwise stated.     Objective    Temp:  [96.9 °F (36.1 °C)-97.8 °F (36.6 °C)] 96.9 °F (36.1 °C)  Heart Rate:  [61-82] 71  Resp:  [16-18] 18  BP: (130-146)/(66-82)  136/66  Physical Exam  Constitutional:       General: He is not in acute distress.     Appearance: He is well-developed. He is not diaphoretic.   HENT:      Head: Normocephalic.   Eyes:      General: No scleral icterus.     Conjunctiva/sclera: Conjunctivae normal.      Pupils: Pupils are equal, round, and reactive to light.   Neck:      Thyroid: No thyromegaly.      Vascular: No JVD.      Trachea: No tracheal deviation.   Cardiovascular:      Rate and Rhythm: Normal rate and regular rhythm.      Heart sounds: Normal heart sounds. No murmur heard.     No friction rub. No gallop.   Pulmonary:      Effort: Pulmonary effort is normal. No respiratory distress.      Breath sounds: Normal breath sounds. No stridor. No wheezing or rales.   Chest:      Chest wall: No tenderness.   Abdominal:      General: Bowel sounds are normal. There is no distension.      Palpations: Abdomen is soft. There is no mass.      Tenderness: There is no abdominal tenderness. There is no guarding or rebound.      Hernia: No hernia is present.   Musculoskeletal:         General: Swelling and signs of injury present. No tenderness or deformity. Normal range of motion.      Cervical back: Normal range of motion and neck supple.      Right lower leg: No edema.      Comments: Dressing over left foot noted.   Lymphadenopathy:      Cervical: No cervical adenopathy.   Skin:     General: Skin is warm and dry.      Coloration: Skin is not pale.      Findings: No erythema or rash.   Neurological:      General: No focal deficit present.      Mental Status: He is alert and oriented to person, place, and time.      Cranial Nerves: No cranial nerve deficit.      Sensory: No sensory deficit.      Motor: No abnormal muscle tone.      Coordination: Coordination normal.   Psychiatric:         Mood and Affect: Mood normal.         Behavior: Behavior normal.            File Link    Scan on 3/26/2024 1423 by Ro Vinson RN: Wound 06/15/23 0102 Left anterior  plantar        Key Information    Document ID File Type Document Type Description   [id number] Image WOUND IMAGE Wound 06/15/23 0102 Left anterior plantar     Import Information    Attached At Date Time User Dept   Encounter Level 3/26/2024  2:23 PM Ro Vinson RN [facility] Emergency Dept     Encounter    Hospital Encounter on 3/26/24     Results Review:  I have reviewed the labs, radiology results, and diagnostic studies.    Laboratory Data:   Results from last 7 days   Lab Units 04/06/24 0340 04/05/24  0512 04/04/24  0514   WBC 10*3/mm3 5.43 6.93 5.64   HEMOGLOBIN g/dL 9.5* 10.9* 10.0*   HEMATOCRIT % 30.2* 34.5* 31.3*   PLATELETS 10*3/mm3 294 321 251        Results from last 7 days   Lab Units 04/06/24 0340 04/05/24 0512 04/04/24  0514 04/01/24  0402 03/31/24  0526   SODIUM mmol/L 141 139  139 139   < > 136   POTASSIUM mmol/L 4.5 4.4  4.4 4.6   < > 3.6   CHLORIDE mmol/L 104 102  102 102   < > 101   CO2 mmol/L 28.0 25.0  25.0 26.0   < > 23.0   BUN mg/dL 33* 31*  31* 32*   < > 51*   CREATININE mg/dL 2.10* 1.98*  1.98* 1.78*   < > 2.52*   CALCIUM mg/dL 10.7* 11.1*  11.1* 10.5   < > 8.6   BILIRUBIN mg/dL  --  0.4  --   --  0.5   ALK PHOS U/L  --  138*  --   --  110   ALT (SGPT) U/L  --  18  --   --  19   AST (SGOT) U/L  --  22  --   --  30   GLUCOSE mg/dL 86 173*  173* 185*   < > 201*    < > = values in this interval not displayed.       Culture Data:   Blood Culture   Date Value Ref Range Status   03/26/2024 Abnormal Stain (C)  Preliminary   03/26/2024 Abnormal Stain (C)  Preliminary       Radiology Data:   Imaging Results (Last 24 Hours)       ** No results found for the last 24 hours. **            I have reviewed the patient's current medications.     Assessment/Plan   Assessment  Active Hospital Problems    Diagnosis     **Sepsis     Diabetic foot infection     Chronic kidney disease (CKD), stage IV (severe)     Chronic diastolic heart failure     BPH without obstruction/lower urinary tract  symptoms     Diabetic polyneuropathy associated with type 2 diabetes mellitus     Anemia due to chronic kidney disease     Essential hypertension     Type 2 diabetes mellitus with hyperglycemia, with long-term current use of insulin    MRSA bacteremia  Metabolic encephalopathy secondary to sepsis  WANDER on CKD    Treatment Plan  Continue IV antibiotics with Zyvox. Infectious disease input appreciated.  Mental status appears back to baseline now that sepsis is better controlled.  However he has persistently positive blood cultures.  Plan for 2D echo on Monday.  Will hold off on inserting PICC line for now until blood cultures are clear.    Podiatry consultation input appreciated.  Will follow recommendations.  Continue wound dressings as per podiatry Follow-up repeat blood cultures.    Nephrology and urology input appreciated.  Hold Bumex due to elevated creatinine and BUN.  No signs of urinary retention at this time.    Pain control with opiate pain medications.    Insulin sliding scale and Levemir for type 2 diabetes mellitus    DVT prophylaxis with Eliquis    PT/OT/wound care consultations    DVT prophylaxis with Eliquis    CODE STATUS is full code    Medical Decision Making  Number and Complexity of problems: 4 acute, severe, high complexity medical problems.  Multiple chronic medical problems.  Differential Diagnosis: None    Conditions and Status        Condition is worsening.     [facility] Data  External documents reviewed: None  Cardiac tracing (EKG, telemetry) interpretation: None  Radiology interpretation: None  Labs reviewed: CBC, BMP, blood cultures reviewed by me  Any tests that were considered but not ordered: None     Decision rules/scores evaluated (example [id number], [place], etc): N/A     Discussed with: Patient     Care Planning  Shared decision making: Patient and his wife  Code status and discussions: CODE STATUS is full code    Disposition  Social Determinants of Health that impact treatment or  disposition: None  I expect the patient to be discharged to home in 3 to 5 days.     Electronically signed by Latanya Paez MD, 04/06/24, 10:26 CDT.

## 2024-04-06 NOTE — PLAN OF CARE
Goal Outcome Evaluation:  Plan of Care Reviewed With: patient           Outcome Evaluation: pt AO4 this shift; IV IID; up in chair, clear diet, NPO at 0000, sitter at bedside; boot on while ambulating; VSS; accu checks; safety maintained

## 2024-04-07 ENCOUNTER — ANESTHESIA (OUTPATIENT)
Dept: PERIOP | Facility: HOSPITAL | Age: 69
End: 2024-04-07
Payer: COMMERCIAL

## 2024-04-07 LAB
ANION GAP SERPL CALCULATED.3IONS-SCNC: 11 MMOL/L (ref 5–15)
BASOPHILS # BLD AUTO: 0.04 10*3/MM3 (ref 0–0.2)
BASOPHILS NFR BLD AUTO: 0.7 % (ref 0–1.5)
BUN SERPL-MCNC: 33 MG/DL (ref 8–23)
BUN/CREAT SERPL: 18.9 (ref 7–25)
CALCIUM SPEC-SCNC: 10.1 MG/DL (ref 8.6–10.5)
CHLORIDE SERPL-SCNC: 103 MMOL/L (ref 98–107)
CO2 SERPL-SCNC: 27 MMOL/L (ref 22–29)
CREAT SERPL-MCNC: 1.75 MG/DL (ref 0.76–1.27)
DEPRECATED RDW RBC AUTO: 46.2 FL (ref 37–54)
EGFRCR SERPLBLD CKD-EPI 2021: 42.1 ML/MIN/1.73
EOSINOPHIL # BLD AUTO: 0.25 10*3/MM3 (ref 0–0.4)
EOSINOPHIL NFR BLD AUTO: 4.4 % (ref 0.3–6.2)
ERYTHROCYTE [DISTWIDTH] IN BLOOD BY AUTOMATED COUNT: 14.1 % (ref 12.3–15.4)
GLUCOSE BLDC GLUCOMTR-MCNC: 160 MG/DL (ref 70–130)
GLUCOSE BLDC GLUCOMTR-MCNC: 174 MG/DL (ref 70–130)
GLUCOSE BLDC GLUCOMTR-MCNC: 184 MG/DL (ref 70–130)
GLUCOSE BLDC GLUCOMTR-MCNC: 200 MG/DL (ref 70–130)
GLUCOSE BLDC GLUCOMTR-MCNC: 208 MG/DL (ref 70–130)
GLUCOSE SERPL-MCNC: 94 MG/DL (ref 65–99)
HCT VFR BLD AUTO: 25.8 % (ref 37.5–51)
HCT VFR BLD AUTO: 29.5 % (ref 37.5–51)
HCT VFR BLD AUTO: 30.5 % (ref 37.5–51)
HGB BLD-MCNC: 8.4 G/DL (ref 13–17.7)
HGB BLD-MCNC: 9.2 G/DL (ref 13–17.7)
HGB BLD-MCNC: 9.5 G/DL (ref 13–17.7)
IMM GRANULOCYTES # BLD AUTO: 0.02 10*3/MM3 (ref 0–0.05)
IMM GRANULOCYTES NFR BLD AUTO: 0.4 % (ref 0–0.5)
LYMPHOCYTES # BLD AUTO: 1.69 10*3/MM3 (ref 0.7–3.1)
LYMPHOCYTES NFR BLD AUTO: 29.6 % (ref 19.6–45.3)
MCH RBC QN AUTO: 28 PG (ref 26.6–33)
MCHC RBC AUTO-ENTMCNC: 31.2 G/DL (ref 31.5–35.7)
MCV RBC AUTO: 89.9 FL (ref 79–97)
MONOCYTES # BLD AUTO: 0.55 10*3/MM3 (ref 0.1–0.9)
MONOCYTES NFR BLD AUTO: 9.6 % (ref 5–12)
NEUTROPHILS NFR BLD AUTO: 3.15 10*3/MM3 (ref 1.7–7)
NEUTROPHILS NFR BLD AUTO: 55.3 % (ref 42.7–76)
NRBC BLD AUTO-RTO: 0 /100 WBC (ref 0–0.2)
PLATELET # BLD AUTO: 275 10*3/MM3 (ref 140–450)
PMV BLD AUTO: 10.5 FL (ref 6–12)
POTASSIUM SERPL-SCNC: 4 MMOL/L (ref 3.5–5.2)
RBC # BLD AUTO: 3.28 10*6/MM3 (ref 4.14–5.8)
SODIUM SERPL-SCNC: 141 MMOL/L (ref 136–145)
WBC NRBC COR # BLD AUTO: 5.7 10*3/MM3 (ref 3.4–10.8)

## 2024-04-07 PROCEDURE — 0DB38ZX EXCISION OF LOWER ESOPHAGUS, VIA NATURAL OR ARTIFICIAL OPENING ENDOSCOPIC, DIAGNOSTIC: ICD-10-PCS | Performed by: INTERNAL MEDICINE

## 2024-04-07 PROCEDURE — 85018 HEMOGLOBIN: CPT | Performed by: HOSPITALIST

## 2024-04-07 PROCEDURE — 63710000001 INSULIN LISPRO (HUMAN) PER 5 UNITS: Performed by: INTERNAL MEDICINE

## 2024-04-07 PROCEDURE — 0W3P8ZZ CONTROL BLEEDING IN GASTROINTESTINAL TRACT, VIA NATURAL OR ARTIFICIAL OPENING ENDOSCOPIC: ICD-10-PCS | Performed by: INTERNAL MEDICINE

## 2024-04-07 PROCEDURE — 25010000002 EPINEPHRINE 1 MG/10ML SOLUTION PREFILLED SYRINGE: Performed by: INTERNAL MEDICINE

## 2024-04-07 PROCEDURE — 85014 HEMATOCRIT: CPT | Performed by: HOSPITALIST

## 2024-04-07 PROCEDURE — 45334 SIGMOIDOSCOPY FOR BLEEDING: CPT | Performed by: INTERNAL MEDICINE

## 2024-04-07 PROCEDURE — 63710000001 INSULIN DETEMIR PER 5 UNITS: Performed by: INTERNAL MEDICINE

## 2024-04-07 PROCEDURE — 85025 COMPLETE CBC W/AUTO DIFF WBC: CPT | Performed by: INTERNAL MEDICINE

## 2024-04-07 PROCEDURE — 25010000002 PROPOFOL 200 MG/20ML EMULSION

## 2024-04-07 PROCEDURE — 80048 BASIC METABOLIC PNL TOTAL CA: CPT | Performed by: INTERNAL MEDICINE

## 2024-04-07 PROCEDURE — 82948 REAGENT STRIP/BLOOD GLUCOSE: CPT

## 2024-04-07 DEVICE — DEV CLIP HEMO RESOLUTION360/ULTR 235CM 17MM STRL: Type: IMPLANTABLE DEVICE | Site: COLON | Status: FUNCTIONAL

## 2024-04-07 DEVICE — DEV CLIP ENDO RESOLUTION360 CONTRL ROT 235CM: Type: IMPLANTABLE DEVICE | Site: COLON | Status: FUNCTIONAL

## 2024-04-07 RX ORDER — TIMOLOL MALEATE 5 MG/ML
1 SOLUTION/ DROPS OPHTHALMIC 2 TIMES DAILY
Status: DISCONTINUED | OUTPATIENT
Start: 2024-04-07 | End: 2024-04-07

## 2024-04-07 RX ORDER — TAMSULOSIN HYDROCHLORIDE 0.4 MG/1
0.4 CAPSULE ORAL DAILY
Status: DISCONTINUED | OUTPATIENT
Start: 2024-04-07 | End: 2024-04-11 | Stop reason: HOSPADM

## 2024-04-07 RX ORDER — LIDOCAINE HYDROCHLORIDE 20 MG/ML
INJECTION, SOLUTION EPIDURAL; INFILTRATION; INTRACAUDAL; PERINEURAL AS NEEDED
Status: DISCONTINUED | OUTPATIENT
Start: 2024-04-07 | End: 2024-04-07 | Stop reason: SURG

## 2024-04-07 RX ORDER — PROPOFOL 10 MG/ML
INJECTION, EMULSION INTRAVENOUS AS NEEDED
Status: DISCONTINUED | OUTPATIENT
Start: 2024-04-07 | End: 2024-04-07 | Stop reason: SURG

## 2024-04-07 RX ORDER — EPINEPHRINE IN SOD CHLOR,ISO 1 MG/10 ML
SYRINGE (ML) INTRAVENOUS AS NEEDED
Status: DISCONTINUED | OUTPATIENT
Start: 2024-04-07 | End: 2024-04-07 | Stop reason: HOSPADM

## 2024-04-07 RX ADMIN — LINEZOLID 600 MG: 600 TABLET, FILM COATED ORAL at 20:50

## 2024-04-07 RX ADMIN — Medication 10 ML: at 14:36

## 2024-04-07 RX ADMIN — INSULIN DETEMIR 30 UNITS: 100 INJECTION, SOLUTION SUBCUTANEOUS at 21:35

## 2024-04-07 RX ADMIN — Medication 1 APPLICATION: at 14:35

## 2024-04-07 RX ADMIN — TIMOLOL MALEATE 1 DROP: 2.5 SOLUTION/ DROPS OPHTHALMIC at 14:35

## 2024-04-07 RX ADMIN — LIDOCAINE HYDROCHLORIDE 100 MG: 20 INJECTION, SOLUTION EPIDURAL; INFILTRATION; INTRACAUDAL; PERINEURAL at 12:34

## 2024-04-07 RX ADMIN — Medication 10 ML: at 20:50

## 2024-04-07 RX ADMIN — PREGABALIN 50 MG: 50 CAPSULE ORAL at 14:35

## 2024-04-07 RX ADMIN — POLYETHYLENE GLYCOL-3350 AND ELECTROLYTES 1000 ML: 236; 6.74; 5.86; 2.97; 22.74 POWDER, FOR SOLUTION ORAL at 06:08

## 2024-04-07 RX ADMIN — Medication 6 MG: at 20:50

## 2024-04-07 RX ADMIN — TAMSULOSIN HYDROCHLORIDE 0.4 MG: 0.4 CAPSULE ORAL at 14:35

## 2024-04-07 RX ADMIN — PREGABALIN 100 MG: 100 CAPSULE ORAL at 20:50

## 2024-04-07 RX ADMIN — INSULIN LISPRO 8 UNITS: 100 INJECTION, SOLUTION INTRAVENOUS; SUBCUTANEOUS at 17:25

## 2024-04-07 RX ADMIN — ISOSORBIDE MONONITRATE 30 MG: 30 TABLET, EXTENDED RELEASE ORAL at 14:35

## 2024-04-07 RX ADMIN — Medication 1 APPLICATION: at 20:50

## 2024-04-07 RX ADMIN — PROPOFOL 50 MG: 10 INJECTION, EMULSION INTRAVENOUS at 12:53

## 2024-04-07 RX ADMIN — TIMOLOL MALEATE 1 DROP: 2.5 SOLUTION/ DROPS OPHTHALMIC at 20:50

## 2024-04-07 RX ADMIN — DOCUSATE SODIUM AND SENNOSIDES 1 TABLET: 8.6; 5 TABLET, FILM COATED ORAL at 20:50

## 2024-04-07 RX ADMIN — PROPOFOL 50 MG: 10 INJECTION, EMULSION INTRAVENOUS at 12:39

## 2024-04-07 RX ADMIN — LACTULOSE 20 G: 20 SOLUTION ORAL at 17:24

## 2024-04-07 RX ADMIN — CARVEDILOL 3.12 MG: 3.12 TABLET, FILM COATED ORAL at 17:25

## 2024-04-07 RX ADMIN — DICYCLOMINE HYDROCHLORIDE 20 MG: 10 CAPSULE ORAL at 20:50

## 2024-04-07 RX ADMIN — DONEPEZIL HYDROCHLORIDE 10 MG: 10 TABLET, FILM COATED ORAL at 14:35

## 2024-04-07 RX ADMIN — INSULIN LISPRO 8 UNITS: 100 INJECTION, SOLUTION INTRAVENOUS; SUBCUTANEOUS at 21:36

## 2024-04-07 RX ADMIN — PROPOFOL 50 MG: 10 INJECTION, EMULSION INTRAVENOUS at 12:34

## 2024-04-07 RX ADMIN — PROPOFOL 50 MG: 10 INJECTION, EMULSION INTRAVENOUS at 12:45

## 2024-04-07 RX ADMIN — LACTULOSE 20 G: 20 SOLUTION ORAL at 20:49

## 2024-04-07 RX ADMIN — ROSUVASTATIN CALCIUM 10 MG: 10 TABLET, FILM COATED ORAL at 20:50

## 2024-04-07 RX ADMIN — LINEZOLID 600 MG: 600 TABLET, FILM COATED ORAL at 14:35

## 2024-04-07 RX ADMIN — OXYCODONE HYDROCHLORIDE 10 MG: 5 TABLET ORAL at 17:32

## 2024-04-07 RX ADMIN — SUCRALFATE 1 G: 1 SUSPENSION ORAL at 17:24

## 2024-04-07 NOTE — PROGRESS NOTES
Nephrology ([facility]) Progress Note      Patient:  Erick Luong  YOB: 1955  Date of Service: 4/7/2024  MRN: [id number]   Acct: [id number]   Primary Care Physician: Del Shetty MD  Advance Directive:   Code Status and Medical Interventions:   Ordered at: 03/26/24 1815     Level Of Support Discussed With:    Health Care Surrogate     Code Status (Patient has no pulse and is not breathing):    CPR (Attempt to Resuscitate)     Medical Interventions (Patient has pulse or is breathing):    Full Support     Admit Date: 3/26/2024       Hospital Day: 12  Referring Provider: No ref. provider found      Patient personally seen and examined.  Complete chart including Consults, Notes, Operative Reports, Labs, Cardiology, and Radiology studies reviewed as able.        Subjective:  Erick Luong is a 68 y.o. male for whom we were consulted for evaluation and treatment of cute kidney injury.  He has stage IIIb chronic kidney disease baseline and follows with Dr Lomas in our office.  Baseline creatinine approximately 1.8. He has a history of insulin-dependent type 2 diabetes, hypertension, abdominal obesity, obstructive sleep apnea, diabetic foot ulcer and coronary artery disease.   Patient presented to ER on 3/26 with altered mental status.  He had debridement of ongoing wound on left foot the day prior. Left foot was found to be warm and erythremic on arrival to ER.  He was noted to have elevated blood glucose over 400. Initial creatinine was 1.9.  He was admitted to medical floor for sepsis due to left foot wound. Patient had been agitated and confused during the admission, requiring wrist restraints due to pulling at lines and tubes.      Today, patient had some rectal bleeding and so was scheduled for colonoscopy tomorrow.  He was up in bed with family visiting.  Still with some increased peripheral edema.  He denied other complaints upon questioning aside from some off-and-on  abdominal pain earlier.    Allergies:  Cefepime, Bactrim [sulfamethoxazole-trimethoprim], Vancomycin, Zolpidem, Zolpidem tartrate, and Metronidazole    Home Meds:  Medications Prior to Admission   Medication Sig Dispense Refill Last Dose    amitriptyline (ELAVIL) 25 MG tablet Take 2 tablets by mouth Daily at bedtime. (Patient not taking: Reported on 3/27/2024) 60 tablet 1 Not Taking    apixaban (ELIQUIS) 5 MG tablet tablet Take 1 tablet by mouth Every 12 (Twelve) Hours.       ascorbic acid (VITAMIN C) 1000 MG tablet Take 1 tablet by mouth Daily. 30 tablet 3     Aspirin 81 MG capsule Take 81 mg by mouth Daily.       bumetanide (BUMEX) 1 MG tablet Take 1 tablet every day by oral route for 30 days. 30 tablet 0     bumetanide (BUMEX) 2 MG tablet Take 1 tablet by mouth Daily. Take 2 tablets in morning;1 tablet at night (Patient not taking: Reported on 3/27/2024)   Not Taking    busPIRone (BUSPAR) 10 MG tablet Take 1 tablet by mouth 3 (Three) Times a Day.       calcitriol (ROCALTROL) 0.5 MCG capsule Take 1 capsule by mouth Daily. 90 capsule 4     calcitriol (ROCALTROL) 0.5 MCG capsule Take 1 capsule every day by oral route for 90 days. (Patient not taking: Reported on 3/27/2024) 90 capsule 4 Not Taking    carvedilol (COREG) 3.125 MG tablet Take 1 tablet twice a day by oral route. 60 tablet 4     carvedilol (COREG) 6.25 MG tablet Take 1 tablet by mouth 2 (Two) Times a Day. (Patient not taking: Reported on 3/27/2024) 180 tablet 4 Not Taking    Cholecalciferol (D-5000) 125 MCG (5000 UT) tablet Take 1 tablet by mouth Daily Before Lunch. 30 tablet 2     cloNIDine (CATAPRES) 0.1 MG tablet Take 1 tablet by mouth Every 12 (Twelve) Hours. (Patient not taking: Reported on 3/27/2024) 60 tablet 2 Not Taking    Diclofenac Sodium (VOLTAREN) 1 % gel gel Apply 2 g topically to the appropriate area as directed 4 (Four) Times a Day As Needed. 300 g 11     Diclofenac Sodium (VOLTAREN) 1 % gel gel Apply 2 g topically to the appropriate area  as directed 4 (Four) Times a Day. (Patient not taking: Reported on 3/27/2024) 300 g 11 Not Taking    donepezil (ARICEPT) 10 MG tablet Take 1 tablet by mouth Daily. (Patient not taking: Reported on 3/27/2024) 90 tablet 4 Not Taking    Dulaglutide (Trulicity) 4.5 MG/0.5ML solution pen-injector Inject 0.5 mL under the skin into the appropriate area as directed 1 (One) Time Per Week. (Patient not taking: Reported on 3/27/2024) 2 mL 11 Not Taking    DULoxetine (CYMBALTA) 60 MG capsule Take 1 capsule by mouth Daily. 90 capsule 4     DULoxetine (CYMBALTA) 60 MG capsule Take 1 capsule by mouth Daily. (Patient not taking: Reported on 3/27/2024) 90 capsule 4 Not Taking    DULoxetine (CYMBALTA) 60 MG capsule Take 1 capsule by mouth Daily. (Patient not taking: Reported on 3/27/2024) 90 capsule 4 Not Taking    empagliflozin (JARDIANCE) 25 MG tablet tablet Take 1 tablet by mouth Daily. (Patient not taking: Reported on 3/27/2024) 30 tablet 2 Not Taking    famotidine (PEPCID) 20 MG tablet Take 1 tablet twice a day by oral route. 180 tablet 4     fluticasone (FLONASE) 50 MCG/ACT nasal spray 1 spray into the nostril(s) as directed by provider Daily. (Patient taking differently: 1 spray into the nostril(s) as directed by provider 2 (Two) Times a Day.) 16 g 1     HYDROcodone-acetaminophen (NORCO) 7.5-325 MG per tablet Take 1 tablet by mouth 2 (Two) Times a Day As Needed. (Patient not taking: Reported on 3/27/2024) 40 tablet 0 Not Taking    Insulin Glargine (Lantus SoloStar) 100 UNIT/ML injection pen Inject 20 Units under the skin into the appropriate area as directed Every Evening. 15 mL 3     Insulin Regular Human, Conc, (HumuLIN R U-500 KwikPen) 500 UNIT/ML solution pen-injector CONCENTRATED injection Inject 120 Units under the skin into the appropriate area as directed 2 (Two) Times a Day with breakfast and dinner. (Patient not taking: Reported on 3/27/2024) 18 mL 5 Not Taking    Insulin Regular Human, Conc, (HumuLIN R U-500  KwikPen) 500 UNIT/ML solution pen-injector CONCENTRATED injection Inject 40 Units under the skin into the appropriate area with regular meals AND 40 Units with large meals. 54 mL 3     isosorbide mononitrate (IMDUR) 30 MG 24 hr tablet Take 1 tablet by mouth Daily.       magnesium hydroxide (Milk of Magnesia) 400 MG/5ML suspension Take 30 mL every day by oral route for 30 days. 900 mL 0     magnesium hydroxide (Milk of Magnesia) 400 MG/5ML suspension Take 30mL by mouth once daily for 30 days. (Patient not taking: Reported on 3/27/2024) 900 mL 0 Not Taking    melatonin 3 MG tablet Take 1 tablet by mouth At Night As Needed for Sleep.       methylcellulose (Citrucel) oral powder Mix 5g as directed and drink twice daily for 90 days. 900 g 10     metoclopramide (REGLAN) 5 MG tablet Take 1 tablet by mouth 3 times a day.       midodrine (PROAMATINE) 2.5 MG tablet Take 1 tablet by mouth 3 (Three) Times a Day Before Meals.       nitroglycerin (NITROSTAT) 0.4 MG SL tablet Dissolve 1 tablet by mouth every 5 minutes as needed for chest pain; MAX 3 tabs/24 hours.  If no improvement after 3 tabs go to ER 25 tablet 0     ondansetron (ZOFRAN) 4 MG tablet Take 1 tablet by mouth Every 6 (Six) Hours As Needed for Nausea or Vomiting.       oxyCODONE (ROXICODONE) 10 MG tablet Take 1 tablet by mouth twice a day as needed 60 tablet 0     pantoprazole (Protonix) 40 MG EC tablet Take 1 tablet by mouth 2 (Two) Times a Day. (Patient not taking: Reported on 3/27/2024) 180 tablet 4 Not Taking    polyethylene glycol (MIRALAX) 17 g packet Take 17 g by mouth Daily. Obtain OTC       prazosin (MINIPRESS) 1 MG capsule Take 1 capsule by mouth every night at bedtime. 30 capsule 2     pregabalin (LYRICA) 100 MG capsule Take 2 capsules by mouth Every Night.       pregabalin (LYRICA) 50 MG capsule Take 1 capsule by mouth Daily.       rosuvastatin (CRESTOR) 10 MG tablet Take 1 tablet by mouth Daily.       sennosides-docusate (PERICOLACE) 8.6-50 MG per  tablet Take 1 tablet by mouth Every Night. Obtain OTC       sennosides-docusate (PERICOLACE) 8.6-50 MG per tablet Take 1 tablet by mouth every night at bedtime. (Patient not taking: Reported on 3/27/2024) 30 tablet 2 Not Taking    sodium hypochlorite (DAKIN'S 1/4 STRENGTH) 0.125 % solution topical solution 0.125% Apply to affected area twice daily (Patient not taking: Reported on 3/27/2024) 473 mL 3 Not Taking    sodium hypochlorite (DAKIN'S 1/4 STRENGTH) 0.125 % solution topical solution 0.125% Apply to affected area twice daily as directed (Patient not taking: Reported on 3/27/2024) 473 mL 5 Not Taking    sodium hypochlorite (DAKIN'S) 0.25 % topical solution Use to wound daily as instructed 473 mL 1     sucralfate (CARAFATE) 1 g tablet Take 1 tablet by mouth 3 times a day.       tamsulosin (FLOMAX) 0.4 MG capsule 24 hr capsule Take 1 capsule by mouth Daily. 90 capsule 1     timolol (TIMOPTIC) 0.5 % ophthalmic solution Administer 1 drop to both eyes 2 (Two) Times a Day.       valsartan (DIOVAN) 40 MG tablet Take 1 tablet by mouth Daily.       zinc sulfate (ZINCATE) 50 MG capsule Take 1 capsule by mouth Daily.          Medicines:  Current Facility-Administered Medications   Medication Dose Route Frequency Provider Last Rate Last Admin    acetaminophen (TYLENOL) tablet 650 mg  650 mg Oral Q4H PRN Raymond Mederos MD   650 mg at 04/04/24 0002    Or    acetaminophen (TYLENOL) 160 MG/5ML oral solution 650 mg  650 mg Oral Q4H PRN Raymond Mederos MD   650 mg at 03/27/24 2109    Or    acetaminophen (TYLENOL) suppository 650 mg  650 mg Rectal Q4H PRN Raymond Mederos MD        [Held by provider] apixaban (ELIQUIS) tablet 5 mg  5 mg Oral Q12H Raquel Duncan APRN   5 mg at 04/05/24 0933    ascorbic acid (VITAMIN C) tablet 1,000 mg  1,000 mg Oral Daily Raymond Mederos MD   1,000 mg at 04/06/24 0933    [Held by provider] aspirin EC tablet 81 mg  81 mg Oral Daily Rene Maloney MD   81 mg at 04/05/24 0946     sennosides-docusate (PERICOLACE) 8.6-50 MG per tablet 1 tablet  1 tablet Oral BID Raymond Mederos MD   1 tablet at 04/06/24 2128    And    polyethylene glycol (MIRALAX) packet 17 g  17 g Oral Daily Raymond Mederos MD   17 g at 04/06/24 0932    And    bisacodyl (DULCOLAX) EC tablet 5 mg  5 mg Oral Daily PRN Raymond Mederos MD        And    bisacodyl (DULCOLAX) suppository 10 mg  10 mg Rectal Daily PRN Raymond Mederos MD        [Held by provider] bumetanide (BUMEX) tablet 1 mg  1 mg Oral Daily Stewart Alvarez, APRN   1 mg at 04/04/24 0847    carvedilol (COREG) tablet 3.125 mg  3.125 mg Oral BID With Meals Rene Maloney MD   3.125 mg at 04/07/24 1725    dextrose (D50W) (25 g/50 mL) IV injection 25 g  25 g Intravenous Q15 Min PRN Raymond Mederos MD        dextrose (GLUTOSE) oral gel 15 g  15 g Oral Q15 Min PRN Raymond Mederos MD   15 g at 03/27/24 1710    Diclofenac Sodium (VOLTAREN) 1 % gel 2 g  2 g Topical 4x Daily PRN Raymond Mederos MD        dicyclomine (BENTYL) capsule 20 mg  20 mg Oral TID PRN Raymond Mederos MD   20 mg at 04/06/24 2311    donepezil (ARICEPT) tablet 10 mg  10 mg Oral Daily Raymond Mederos MD   10 mg at 04/07/24 1435    [Transfer Hold] glucagon (GLUCAGEN) injection 1 mg  1 mg Intramuscular Q15 Min PRN Raquel Duncan, APRN        hydrocortisone (ANUSOL-HC) suppository 25 mg  25 mg Rectal BID Raymond Mederos MD   25 mg at 04/06/24 2129    insulin detemir (LEVEMIR) injection 30 Units  30 Units Subcutaneous Nightly Raymond Mederos MD   30 Units at 04/06/24 2152    Insulin Lispro (humaLOG) injection 4-24 Units  4-24 Units Subcutaneous 4x Daily AC & at Bedtime Raymond Mederos MD   8 Units at 04/07/24 1725    insulin regular (humuLIN R,novoLIN R) injection 10 Units  10 Units Subcutaneous TID AC Raymond Mederos MD   10 Units at 04/06/24 1340    isosorbide mononitrate (IMDUR) 24 hr tablet 30 mg  30 mg Oral Q24H Raymond Mederos MD   30 mg at 04/07/24 1435    lactulose solution 20 g  20 g Oral  TID Raymond Mederos MD   20 g at 04/07/24 1724    linezolid (ZYVOX) tablet 600 mg  600 mg Oral Q12H Raymond Mederos MD   600 mg at 04/07/24 1435    magnesium hydroxide (MILK OF MAGNESIA) suspension 10 mL  10 mL Oral Daily PRN Raymond Mederos MD   10 mL at 04/03/24 0414    melatonin tablet 6 mg  6 mg Oral Nightly Raymond Mederos MD   6 mg at 04/06/24 2128    midodrine (PROAMATINE) tablet 2.5 mg  2.5 mg Oral TID AC Raymond Mederos MD   2.5 mg at 04/06/24 1718    mupirocin (BACTROBAN) 2 % nasal ointment 1 Application  1 Application Each Nare BID Raymond Mederos MD   1 Application at 04/07/24 1435    nitroglycerin (NITROSTAT) SL tablet 0.4 mg  0.4 mg Sublingual Q5 Min PRN Raymond Mederos MD        ondansetron ODT (ZOFRAN-ODT) disintegrating tablet 4 mg  4 mg Oral Q6H PRN Raymond Mederos MD   4 mg at 04/03/24 0537    Or    ondansetron (ZOFRAN) injection 4 mg  4 mg Intravenous Q6H PRN Raymond Mederos MD   4 mg at 03/29/24 1837    oxyCODONE (ROXICODONE) immediate release tablet 10 mg  10 mg Oral BID PRN Raymond Mederos MD   10 mg at 04/07/24 1732    pregabalin (LYRICA) capsule 100 mg  100 mg Oral Nightly Rene Maloney MD   100 mg at 04/06/24 2130    pregabalin (LYRICA) capsule 50 mg  50 mg Oral Daily Rene Maloney MD   50 mg at 04/07/24 1435    rosuvastatin (CRESTOR) tablet 10 mg  10 mg Oral Nightly Raymond Mederos MD   10 mg at 04/06/24 2128    sodium chloride 0.9 % flush 10 mL  10 mL Intravenous PRN Raymond Mederos MD        sodium chloride 0.9 % flush 10 mL  10 mL Intravenous Q12H Raymond Mederos MD   10 mL at 04/07/24 1436    sodium chloride 0.9 % flush 10 mL  10 mL Intravenous PRN Raymond Mederos MD        sodium chloride 0.9 % infusion 40 mL  40 mL Intravenous PRN Raymond Mederos MD        sucralfate (CARAFATE) 1 GM/10ML suspension 1 g  1 g Oral TID AC Raymond Mederos MD   1 g at 04/07/24 1724    tamsulosin (FLOMAX) 24 hr capsule 0.4 mg  0.4 mg Oral Daily Raymond Mederos MD   0.4 mg at 04/07/24 143     timolol (TIMOPTIC) 0.25 % ophthalmic solution 1 drop  1 drop Both Eyes Q12H Raymond Mederos MD   1 drop at 04/07/24 1435    zinc sulfate (ZINCATE) capsule 220 mg  220 mg Oral Daily Raymond Mederos MD   220 mg at 04/06/24 0934       Past Medical History:  Past Medical History:   Diagnosis Date    Arthritis     Autonomic disease     CAD (coronary artery disease) 02/06/2017    Cervical radiculopathy 09/16/2021    Chronic constipation with acute exaccerbation 05/10/2021    Coronary artery disease     Degeneration of cervical intervertebral disc 08/11/2021    Diabetes mellitus     Diabetic foot ulcer 08/31/2020    Diabetic polyneuropathy associated with type 2 diabetes mellitus 01/18/2021    Elevated cholesterol     Gastroesophageal reflux disease 05/13/2019    Headache     HTN (hypertension), benign 05/03/2017    Hyperlipidemia     Hypertension     Mixed hyperlipidemia 02/07/2017    Multiple lung nodules 01/26/2020    5mm, 9 mm RLL identified 1/2020, not present 10/2019.    Myocardial infarction     Osteomyelitis 01/22/2020    Osteomyelitis of fifth toe of right foot 10/07/2019    Pancreatitis     Persistent insomnia 01/20/2020    Renal disorder     Sleep apnea 02/06/2017    Sleep apnea with use of continuous positive airway pressure (CPAP)     NON-COMPLIANT    Slow transit constipation 01/16/2019    Spinal stenosis in cervical region 09/16/2021    Vitamin D deficiency 03/02/2021       Past Surgical History:  Past Surgical History:   Procedure Laterality Date    ABDOMINAL SURGERY      AMPUTATION FOOT / TOE Left 10/2021    5th digit     ANTERIOR CERVICAL DISCECTOMY W/ FUSION N/A 8/5/2022    Procedure: CERVICAL DISCECTOMY ANTERIOR WITH FUSION C5-6 with possible plating of C5-7 with neuromonitoring and 1 c-arm;  Surgeon: Karel Soliz MD;  Location: [facility];  Service: Neurosurgery;  Laterality: N/A;    APPENDECTOMY      BACK SURGERY      CARDIAC CATHETERIZATION Left 02/08/2021    Procedure: Left Heart Cath w poss  intervention left anatomical snuff box acess;  Surgeon: Omkar Charles DO;  Location:  [facility];  Service: Cardiology;  Laterality: Left;    CARDIAC SURGERY      CATARACT EXTRACTION      CERVICAL SPINE SURGERY      COLONOSCOPY N/A 2017    Normal exam repeat in 5 years    COLONOSCOPY N/A 2019    Mild acute inflammation    COLONOSCOPY W/ POLYPECTOMY  2014    Hyperplastic polyp    CORONARY ARTERY BYPASS GRAFT  10/2015    ENDOSCOPY  2011    Gastritis with hemorrhage    ENDOSCOPY N/A 2017    Normal exam    ENDOSCOPY N/A 2019    Gastritis    ENDOSCOPY N/A 2020    Non-erosive gastritis with hemorrhage    ENDOSCOPY N/A 02/10/2021    Esophagitis    FOOT SURGERY Left     INCISION AND DRAINAGE OF WOUND Left 2007    spider bite       Family History  Family History   Problem Relation Age of Onset    Colon cancer Father     Heart disease Father     Colon cancer Sister     Colon polyps Sister     Alzheimer's disease Mother     Coronary artery disease Sister     Coronary artery disease Sister        Social History  Social History     Socioeconomic History    Marital status:    Tobacco Use    Smoking status: Former     Current packs/day: 0.00     Types: Cigarettes     Quit date:      Years since quittin.2    Smokeless tobacco: Never    Tobacco comments:     [place]   Vaping Use    Vaping status: Never Used   Substance and Sexual Activity    Alcohol use: No    Drug use: No    Sexual activity: Defer       Review of Systems:  History obtained from chart review and the patient  General ROS: No fever or chills  Respiratory ROS: No cough, shortness of breath, wheezing  Cardiovascular ROS: No chest pain or palpitations  Gastrointestinal ROS: No nausea or vomiting  Genito-Urinary ROS: No dysuria or hematuria  Psych ROS: No anxiety and depression  14 point ROS reviewed with the patient and negative except as noted above and in the HPI  unless unable to obtain.    Objective:  Patient Vitals for the past 24 hrs:   BP Temp Temp src Pulse Resp SpO2   04/07/24 1609 143/62 97.4 °F (36.3 °C) Oral 72 16 99 %   04/07/24 1422 137/66 97.2 °F (36.2 °C) Oral 70 16 98 %   04/07/24 1343 125/65 96.8 °F (36 °C) Oral 63 14 98 %   04/07/24 1335 125/65 97 °F (36.1 °C) Temporal 68 14 98 %   04/07/24 1325 140/91 -- -- 65 14 100 %   04/07/24 1320 127/67 -- -- 59 14 100 %   04/07/24 1315 124/73 -- -- 67 12 99 %   04/07/24 1310 162/81 -- -- 68 12 100 %   04/07/24 1309 162/81 97.2 °F (36.2 °C) Temporal 67 12 100 %   04/07/24 1102 139/74 97.2 °F (36.2 °C) Oral 68 16 100 %   04/07/24 0742 126/68 96.8 °F (36 °C) Oral 62 16 100 %   04/07/24 0440 116/61 97.4 °F (36.3 °C) Oral 63 16 100 %   04/06/24 2355 113/62 97.2 °F (36.2 °C) Oral 62 16 100 %   04/06/24 2027 127/53 97.4 °F (36.3 °C) Oral 60 16 100 %       Intake/Output Summary (Last 24 hours) at 4/7/2024 1855  Last data filed at 4/7/2024 1707  Gross per 24 hour   Intake 240 ml   Output --   Net 240 ml     General: awake/alert   Chest:  clear to auscultation bilaterally without respiratory distress  CVS: regular rate and rhythm  Abdominal: soft, nontender, positive bowel sounds  Extremities: ble edema  Skin: warm and dry without rash      Labs:  Results from last 7 days   Lab Units 04/07/24  1429 04/07/24  0422 04/06/24  2352 04/06/24  1344 04/06/24  0340 04/05/24  0512   WBC 10*3/mm3  --  5.70  --   --  5.43 6.93   HEMOGLOBIN g/dL 9.5* 9.2* 9.3*   < > 9.5* 10.9*   HEMATOCRIT % 30.5* 29.5* 28.8*   < > 30.2* 34.5*   PLATELETS 10*3/mm3  --  275  --   --  294 321    < > = values in this interval not displayed.         Results from last 7 days   Lab Units 04/07/24  0422 04/06/24  0340 04/05/24  0512   SODIUM mmol/L 141 141 139  139   POTASSIUM mmol/L 4.0 4.5 4.4  4.4   CHLORIDE mmol/L 103 104 102  102   CO2 mmol/L 27.0 28.0 25.0  25.0   BUN mg/dL 33* 33* 31*  31*   CREATININE mg/dL 1.75* 2.10* 1.98*  1.98*   CALCIUM mg/dL  10.1 10.7* 11.1*  11.1*   EGFR mL/min/1.73 42.1* 33.9* 36.3*  36.3*   BILIRUBIN mg/dL  --   --  0.4   ALK PHOS U/L  --   --  138*   ALT (SGPT) U/L  --   --  18   AST (SGOT) U/L  --   --  22   GLUCOSE mg/dL 94 86 173*  173*       Radiology:   Imaging Results (Last 72 Hours)       Procedure Component Value Units Date/Time    NM GI Blood Loss [[id number]] Collected: 04/06/24 1327     Updated: 04/06/24 1334    Narrative:      EXAM: NM GI BLOOD LOSS-     INDICATION: Lower GI bleed (Ped 0-17y)     RADIOPHARMACEUTICAL: Tc-99m labeled red cells 27.6 mCi IV     TECHNICAL: The patient's red blood cells were labeled in vitro and  reinjected.  Sequential five-minute anterior planar images of the  abdomen were obtained for two hours.  These were also reformatted into  cine mode.     COMPARISON: CT abdomen pelvis 7/22/2023     FINDINGS:     No evidence of an active GI bleed.       Impression:         No evidence of an active GI bleed.      This report was signed and finalized on 4/6/2024 1:31 PM by Ronal Wisdom.               Culture:  Blood Culture   Date Value Ref Range Status   04/01/2024 No growth at 24 hours  Preliminary   03/30/2024 No growth at 3 days  Preliminary   03/29/2024 Staphylococcus aureus, MRSA (C)  Final     Comment:       Methicillin resistant Staphylococcus aureus, Patient may be an isolation risk.  Infectious disease consultation is highly recommended to rule out distant foci of infection.         Assessment   Acute kidney injury  Chronic kidney disease stage IIIb  Diabetes type 2 with diabetic nephropathy  Hypertension  Hypokalemia  Anemia and chronic kidney disease  MRSA bacteremia     Plan:  Discussed with patient, nursing, family  Workup reviewed today  ID and podiatry evaluations reviewed  Holding Bumex for now-planning to restart today  Antibiotics per ID recommendations        Willy Arteaga MD  4/7/2024  18:55 CDT

## 2024-04-07 NOTE — ANESTHESIA POSTPROCEDURE EVALUATION
"Patient: Erick DONATO Cherise    Procedure Summary       Date: 04/07/24 Room / Location:  [facility] OR 02 /  [facility]    Anesthesia Start: 1219 Anesthesia Stop: 1314    Procedure: COLONOSCOPY WITH ANESTHESIA Diagnosis: (Rectal bleed)    Surgeons: Raymond Mederos MD Provider: Tj Hardwick CRNA    Anesthesia Type: MAC ASA Status: 3            Anesthesia Type: MAC    Vitals  Vitals Value Taken Time   /73 04/07/24 1318   Temp 97.2 °F (36.2 °C) 04/07/24 1309   Pulse 59 04/07/24 1320   Resp 12 04/07/24 1315   SpO2 100 % 04/07/24 1320   Vitals shown include unfiled device data.        Post Anesthesia Care and Evaluation    Patient location during evaluation: PHASE II  Patient participation: complete - patient participated  Level of consciousness: awake and alert  Pain score: 0  Pain management: adequate    Airway patency: patent  Anesthetic complications: No anesthetic complications  PONV Status: none  Cardiovascular status: stable and acceptable  Respiratory status: acceptable  Hydration status: acceptable    Comments: Blood pressure 124/73, pulse 67, temperature 97.2 °F (36.2 °C), temperature source Temporal, resp. rate 12, height 182.9 cm (72\"), weight 131 kg (289 lb 3.2 oz), SpO2 99%.    No anesthesia care post op    "

## 2024-04-07 NOTE — PLAN OF CARE
Goal Outcome Evaluation:      Patient A/O x2-3 with intermittent confusion/forgetfulness. Patient had frequent/repeated requests for pain medication, even while giving medication on one instance. Patient had to be redirected that pain medication was being administered. Patient remained impulsive and sitter had to intervene. Patient finished bowel prep per MAR. Will update oncoming dayshift nurse at bedside shift change report in the a.m. as appropriate.

## 2024-04-07 NOTE — ANESTHESIA PREPROCEDURE EVALUATION
Anesthesia Evaluation     Patient summary reviewed and Nursing notes reviewed   no history of anesthetic complications:                Airway   Mallampati: III  TM distance: >3 FB  Neck ROM: limited  Dental - normal exam     Pulmonary - normal exam   (+) ,sleep apnea  (-) COPD, asthma, not a smoker  Cardiovascular     Patient on routine beta blocker and Beta blocker given within 24 hours of surgery    (+) hypertension, CAD, CABG, PVD, hyperlipidemia  (-) pacemaker, angina, cardiac stents      Neuro/Psych  (+) headaches, syncope, numbness  (-) seizures, TIA, CVA  GI/Hepatic/Renal/Endo    (+) morbid obesity, - CRI, diabetes mellitus well controlled using insulin  (-) GERD, liver disease    Musculoskeletal     Abdominal  - normal exam   Substance History - negative use     OB/GYN negative ob/gyn ROS         Other   arthritis,                       Anesthesia Plan    ASA 3     MAC     intravenous induction     Anesthetic plan, risks, benefits, and alternatives have been provided, discussed and informed consent has been obtained with: patient.  Pre-procedure education provided  Use of blood products discussed with patient  Consented to blood products.    Plan discussed with CRNA.

## 2024-04-07 NOTE — OP NOTE
COLONOSCOPY with hemoclipping of a bleeding lesion and submucosal injection therapy of a bleeding lesion (incomplete secondary to poor colon preparation    Date:  4/7/2024    Indications: Rectal bleeding       Procedure: Colonoscopy with hemoclipping of a bleeding lesion and injection therapy of a bleeding lesion    Sedation: As per anesthesia.    Surgeon: Cristopher Mederos MD     Anesthesia: Monitored Anesthesia Care     Procedure  Description: After informed consent was obtained, a timeout was called in the endoscopy suite to confirm the correct patient and appropriate procedure.  Thereafter with the patient in the left lateral position, adult Olympus colonoscope was inserted into the rectum.  Thereafter under direct vision scope was slowly advanced into the area of the transverse colon by palpation.  The procedure was aborted at this point secondary to a large amount of solid thick liquid stool which precluded further advancement of the colonoscope.  Careful examination of the colon was performed while slowly withdrawing the scope.  Retroflex examination of the rectum was also performed.    Findings: After informed consent and medications per the anesthesia service, the digital rectal examination was unremarkable.  The Olympus variable stiffness pediatric videocolonoscope was advanced into the anal canal and through to approximately the transverse colon by palpation.  We could not advance the colonoscope further secondary to solid and thick liquid stool precluding full visualization of the colonic mucosa.  No blood was seen and brown stool was noted.  There was however, 2 areas at 10 cm and 20 cm with friability ulceration and what appeared to be visible vessels noted in these locations.  We placed 2 clips on what appeared to be visible vessels at 20 cm and 4 clips and what appeared to be an area of visible vessels and ulceration at 10 cm and these areas were injected with a total of 5 cc of 1: 10,000 epinephrine with  hemostasis achieved.  These areas did bleed easily when initially hemoclipped however hemostasis was achieved at the termination of the procedure.  Photodocumentation was obtained throughout.  Rectal turnaround otherwise appeared normal.  Withdrawal time from the transverse colon was approximately 7 minutes.  The preparation was poor throughout with a scattered amount of thick liquid stool precluding full visualization of the mucosa however the mucosa that was visualized appeared normal except for these focal areas of mild erosions and ulcerations in visible vessels.    Complications: No immediate complications.    Recommendations: May have clear liquids today and advance diet further tomorrow if the patient has not had any further clinical bleeding and hemoglobin is stable.  If the patient should bleed further would recommend transfer to an institution that can provide interventional radiology and mesenteric arteriographic services.  Avoid aspirin and nonsteroidal anti-inflammatory drugs if possible.  The patient will eventually need a full colonoscopy, however, I believe that the patient is red rectal bleeding was coming from the areas of ulceration and visible vessels in the rectosigmoid colon.  The patient should follow-up with a gastroenterologist within the week after hospital release to have a complete colonoscopy with a more extensive colonic preparation.  The patient will be seen by another gastroenterologist tomorrow who will take over the service.  Agree with serial CBCs and blood transfusions as needed per the primary service.  Avoid constipation.  Further recommendations will be made based on the results of the above studies and the patient's clinical course.  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  Any results not available at time of inpatient discharge and/or GI clinic follow up should be managed by the hospitalist team, PCP, or outpatient gastroenterologist.

## 2024-04-07 NOTE — NURSING NOTE
Bedside shift report completed. Care handed over at this time. Sitter assigned for this room. Patient is finishing his golytely per MAR--otherwise strict NPO per orders.

## 2024-04-07 NOTE — PROGRESS NOTES
[facility] Medicine Services  INPATIENT PROGRESS NOTE    Patient Name: Erick Luong  Date of Admission: 3/26/2024  Today's Date: 04/07/24  Length of Stay: 12  Primary Care Physician: Del Shetty MD    Subjective   Chief Complaint: Altered mental status  Erick Luong is a 68-year-old male with a past medical history of coronary artery disease, diastolic dysfunction followed by Dr. Garay, vascular disease, GERD, diabetes, chronic anemia, chronic nonhealing wound to the left foot followed by [facility] wound care.  Patient had an extended hospitalization 8/2023 due to syncope, subsequent admission to [facility] 9/18 - 10/11, 2023.  During that admission he did undergo debridement of Proteus wound infection.     Patient seen by PCP on 3/11/2024, started on prazosin 1 mg daily, duloxetine 60 mg daily magnesium hydroxide 400 mg daily per office note.  Wife is unaware of exactly what patient is taking.  Will need pharmacy to obtain correct med list.     [facility] performed debridement on the plantar wound treating with Santyl, is also a area on dorsal aspect of the foot.  Foot is erythemic, warm to touch.  Doctor told patient and wife he was doing well.  There was no signs of infection yesterday.  Today patient was brought to emergency department via EMS for altered mental status with disorientation, glucose was noted to be over 500 in the ambulance.  Initial glucose here 400 followed with 438 treated with regular insulin.  I have taken care of this patient many times.  He awakens for questions.  He is somewhat somnolent.  Remainder workup reveals creatinine 1.9 at baseline, CRP 4.3, lactic acid 3.2 and procalcitonin 6.56.  He does have a white count of 14.6, no bandemia.  Initially afebrile however now 100.7, patient meets criteria for sepsis with tachycardia and tachypnea, elevated lactic acid and source of infection.   There is no organ failure.  Patient is admitted for simple sepsis, condition stabl  3/31   Patient is alert and oriented x 3.  He appears to be in good spirits.  He is requesting for discharge.  He was counseled that we will need to wait until later in this week, after we set up IV antibiotics before we can discharge him.    4/1  Patient was admitted to ER on 3/26 with altered mental status. Had debridement of ongoing wound on left foot the day prior. Left foot warm and erythremic on arrival to ER. Noted to have elevated blood glucose over 400. Initial creatinine of 1.9.  [facility] nephrology were consulted for acute on chronic renal failure MRI of the left foot did not show any osteomyelitis or abscess.  [facility] ID patient was found to have MRSA bacteremia-blood cultures + March 26 and March 29.  Secondary to infected left foot with wounds and associated cellulitis on admission patient was started on Zyvox plan is for echo today rule out endocarditis given persistent bacteremia holding off on PICC line pending that   4/2  As before history of coronary disease diabetes chronic diabetic foot ulcer noncompliance with A1c 10.4 presented to us with mental status change was found to have MRSA sepsis bacteremia secondary to his infected foot MRI did not show any osteo or abscess has been on Zyvox echo was ordered to rule out endocarditis for persistent bacteremia, podiatry recommends wound care and follow-up with outpatient wound care appreciate ID Currently plan for at least 2 weeks of linezolid with start date after clearance of blood cultures-currently March 30. Should be able to transition to oral linezolid given increased bioavailability. Duration may change based on echocardiogram findings , [facility] nephrology acute on chronic renal failure secondary to ATN resolving  4/3  Remains on Zyvox blood cultures remains negative echo of the heart could not rule out endocarditis poor quality recommending YANIV if  endocarditis is a concern we will leave this up to the infectious disease attending appreciate nephrology renal function is getting better, per podiatry continue wound care continue antibiotics MRI is showing no evidence of soft tissue abscess or osteomyelitis podiatry will see as outpatient, patient A1c is 10.8 needs much better control of his diabetes and much better follow-up with his family doctor regarding his diabetes, and is having stomach distention and bloating last bowel movement was 3 days ago will do KUB and start him on lactulose  4/4  As above ATN is resolving creatinine down to 1.78, reassuring extensive constipation to account for his abdominal pain was started on lactulose and will go ahead and start him on enemas, appreciate ID continue Zyvox I agree with ID given the lack of persistent bacteremia no need for YANIV continue wound care repeat blood culture remains unremarkable from March 30, awaiting SNF, patient said that he did not have any more bowel movements we will go ahead and start him on enemas awaiting SNF  4/5  Appreciate nephrology will hold Bumex today kidney function is slightly worse appreciate ID remains on Zyvox for MRSA bacteremia no need for YANIV continue wound care family still wants to take him home with home health and not interested in rehab but culture from March 30 and February 1 remains unremarkable, and has a sitter last night  4/6  Patient apparently had a massive GI bleed reported his aspirin and Eliquis on hold started Protonix do H&H and consulted GI globin is relatively stable and for colonoscopy for tomorrow , Will also obtain a nuclear tagged red blood cell scan.   4/7  Hemoglobin remained stable off aspirin and Eliquis appreciate GI plan for colonoscopy today admitted for MRSA sepsis secondary to diabetic foot ulcer was seen by podiatry ID on board remains on Zyvox Zyvox has been changed to oral will need 1 more week ATN is resolving needs SNF, going for  colonoscopy  Review of Systems   All pertinent negatives and positives are as above. All other systems have been reviewed and are negative unless otherwise stated.     Objective    Temp:  [96.5 °F (35.8 °C)-97.5 °F (36.4 °C)] 96.8 °F (36 °C)  Heart Rate:  [60-74] 62  Resp:  [16-18] 16  BP: (113-150)/(53-79) 126/68  Physical Exam  Constitutional:       General: He is not in acute distress.     Appearance: He is well-developed. He is not diaphoretic.   HENT:      Head: Normocephalic.   Eyes:      General: No scleral icterus.     Conjunctiva/sclera: Conjunctivae normal.      Pupils: Pupils are equal, round, and reactive to light.   Neck:      Thyroid: No thyromegaly.      Vascular: No JVD.      Trachea: No tracheal deviation.   Cardiovascular:      Rate and Rhythm: Normal rate and regular rhythm.      Heart sounds: Normal heart sounds. No murmur heard.     No friction rub. No gallop.   Pulmonary:      Effort: Pulmonary effort is normal. No respiratory distress.      Breath sounds: Normal breath sounds. No stridor. No wheezing or rales.   Chest:      Chest wall: No tenderness.   Abdominal:      General: Bowel sounds are normal. There is no distension.      Palpations: Abdomen is soft. There is no mass.      Tenderness: There is no abdominal tenderness. There is no guarding or rebound.      Hernia: No hernia is present.   Musculoskeletal:         General: Swelling and signs of injury present. No tenderness or deformity. Normal range of motion.      Cervical back: Normal range of motion and neck supple.      Right lower leg: No edema.      Comments: Dressing over left foot noted.   Lymphadenopathy:      Cervical: No cervical adenopathy.   Skin:     General: Skin is warm and dry.      Coloration: Skin is not pale.      Findings: No erythema or rash.   Neurological:      General: No focal deficit present.      Mental Status: He is alert and oriented to person, place, and time.      Cranial Nerves: No cranial nerve deficit.       Sensory: No sensory deficit.      Motor: No abnormal muscle tone.      Coordination: Coordination normal.   Psychiatric:         Mood and Affect: Mood normal.         Behavior: Behavior normal.            File Link    Scan on 3/26/2024 1423 by Ro Vinson RN: Wound 06/15/23 0102 Left anterior plantar        Key Information    Document ID File Type Document Type Description   [id number] Image WOUND IMAGE Wound 06/15/23 0102 Left anterior plantar     Import Information    Attached At Date Time User Dept   Encounter Level 3/26/2024  2:23 PM Ro Vinson RN [facility] Emergency Dept     Encounter    Hospital Encounter on 3/26/24     Results Review:  I have reviewed the labs, radiology results, and diagnostic studies.    Laboratory Data:   Results from last 7 days   Lab Units 04/07/24  0422 04/06/24  2352 04/06/24  1749 04/06/24  1344 04/06/24  0340 04/05/24  0512   WBC 10*3/mm3 5.70  --   --   --  5.43 6.93   HEMOGLOBIN g/dL 9.2* 9.3* 9.5*   < > 9.5* 10.9*   HEMATOCRIT % 29.5* 28.8* 30.0*   < > 30.2* 34.5*   PLATELETS 10*3/mm3 275  --   --   --  294 321    < > = values in this interval not displayed.        Results from last 7 days   Lab Units 04/07/24  0422 04/06/24  0340 04/05/24  0512   SODIUM mmol/L 141 141 139  139   POTASSIUM mmol/L 4.0 4.5 4.4  4.4   CHLORIDE mmol/L 103 104 102  102   CO2 mmol/L 27.0 28.0 25.0  25.0   BUN mg/dL 33* 33* 31*  31*   CREATININE mg/dL 1.75* 2.10* 1.98*  1.98*   CALCIUM mg/dL 10.1 10.7* 11.1*  11.1*   BILIRUBIN mg/dL  --   --  0.4   ALK PHOS U/L  --   --  138*   ALT (SGPT) U/L  --   --  18   AST (SGOT) U/L  --   --  22   GLUCOSE mg/dL 94 86 173*  173*       Culture Data:   Blood Culture   Date Value Ref Range Status   03/26/2024 Abnormal Stain (C)  Preliminary   03/26/2024 Abnormal Stain (C)  Preliminary       Radiology Data:   Imaging Results (Last 24 Hours)       Procedure Component Value Units Date/Time    NM GI Blood Loss [[id number]] Collected: 04/06/24 1322      Updated: 04/06/24 5266    Narrative:      EXAM: NM GI BLOOD LOSS-     INDICATION: Lower GI bleed (Ped 0-17y)     RADIOPHARMACEUTICAL: Tc-99m labeled red cells 27.6 mCi IV     TECHNICAL: The patient's red blood cells were labeled in vitro and  reinjected.  Sequential five-minute anterior planar images of the  abdomen were obtained for two hours.  These were also reformatted into  cine mode.     COMPARISON: CT abdomen pelvis 7/22/2023     FINDINGS:     No evidence of an active GI bleed.       Impression:         No evidence of an active GI bleed.      This report was signed and finalized on 4/6/2024 1:31 PM by Ronal Wisdom.               I have reviewed the patient's current medications.     Assessment/Plan   Assessment  Active Hospital Problems    Diagnosis     **Sepsis     Diabetic foot infection     Chronic kidney disease (CKD), stage IV (severe)     Chronic diastolic heart failure     BPH without obstruction/lower urinary tract symptoms     Diabetic polyneuropathy associated with type 2 diabetes mellitus     Anemia due to chronic kidney disease     Essential hypertension     Type 2 diabetes mellitus with hyperglycemia, with long-term current use of insulin    MRSA bacteremia  Metabolic encephalopathy secondary to sepsis  WANDER on CKD    Treatment Plan  Continue IV antibiotics with Zyvox. Infectious disease input appreciated.  Mental status appears back to baseline now that sepsis is better controlled.  However he has persistently positive blood cultures.  Plan for 2D echo on Monday.  Will hold off on inserting PICC line for now until blood cultures are clear.    Podiatry consultation input appreciated.  Will follow recommendations.  Continue wound dressings as per podiatry Follow-up repeat blood cultures.    Nephrology and urology input appreciated.  Hold Bumex due to elevated creatinine and BUN.  No signs of urinary retention at this time.    Pain control with opiate pain medications.    Insulin sliding  scale and Levemir for type 2 diabetes mellitus    DVT prophylaxis with Eliquis    PT/OT/wound care consultations    DVT prophylaxis with Eliquis    CODE STATUS is full code    Medical Decision Making  Number and Complexity of problems: 4 acute, severe, high complexity medical problems.  Multiple chronic medical problems.  Differential Diagnosis: None    Conditions and Status        Condition is worsening.     [facility] Data  External documents reviewed: None  Cardiac tracing (EKG, telemetry) interpretation: None  Radiology interpretation: None  Labs reviewed: CBC, BMP, blood cultures reviewed by me  Any tests that were considered but not ordered: None     Decision rules/scores evaluated (example [id number], [place], etc): N/A     Discussed with: Patient     Care Planning  Shared decision making: Patient and his wife  Code status and discussions: CODE STATUS is full code    Disposition  Social Determinants of Health that impact treatment or disposition: None  I expect the patient to be discharged to home in 3 to 5 days.     Electronically signed by Latanya Paez MD, 04/07/24, 11:03 CDT.

## 2024-04-07 NOTE — PROGRESS NOTES
Nephrology ([facility]) Progress Note      Patient:  Erick Luong  YOB: 1955  Date of Service: 4/6/2024  MRN: [id number]   Acct: [id number]   Primary Care Physician: Del Shetty MD  Advance Directive:   Code Status and Medical Interventions:   Ordered at: 03/26/24 1815     Level Of Support Discussed With:    Health Care Surrogate     Code Status (Patient has no pulse and is not breathing):    CPR (Attempt to Resuscitate)     Medical Interventions (Patient has pulse or is breathing):    Full Support     Admit Date: 3/26/2024       Hospital Day: 11  Referring Provider: No ref. provider found      Patient personally seen and examined.  Complete chart including Consults, Notes, Operative Reports, Labs, Cardiology, and Radiology studies reviewed as able.        Subjective:  Erick Luong is a 68 y.o. male for whom we were consulted for evaluation and treatment of cute kidney injury.  He has stage IIIb chronic kidney disease baseline and follows with Dr Lomas in our office.  Baseline creatinine approximately 1.8. He has a history of insulin-dependent type 2 diabetes, hypertension, abdominal obesity, obstructive sleep apnea, diabetic foot ulcer and coronary artery disease.   Patient presented to ER on 3/26 with altered mental status.  He had debridement of ongoing wound on left foot the day prior. Left foot was found to be warm and erythremic on arrival to ER.  He was noted to have elevated blood glucose over 400. Initial creatinine was 1.9.  He was admitted to medical floor for sepsis due to left foot wound. Patient had been agitated and confused during the admission, requiring wrist restraints due to pulling at lines and tubes.      Today, patient had some rectal bleeding and so was scheduled for colonoscopy tomorrow.  He was up in chair with family visiting.  Still with some peripheral edema.  He denied other complaints upon questioning aside from some off-and-on abdominal  pain earlier.    Allergies:  Cefepime, Bactrim [sulfamethoxazole-trimethoprim], Vancomycin, Zolpidem, Zolpidem tartrate, and Metronidazole    Home Meds:  Medications Prior to Admission   Medication Sig Dispense Refill Last Dose    amitriptyline (ELAVIL) 25 MG tablet Take 2 tablets by mouth Daily at bedtime. (Patient not taking: Reported on 3/27/2024) 60 tablet 1 Not Taking    apixaban (ELIQUIS) 5 MG tablet tablet Take 1 tablet by mouth Every 12 (Twelve) Hours.       ascorbic acid (VITAMIN C) 1000 MG tablet Take 1 tablet by mouth Daily. 30 tablet 3     Aspirin 81 MG capsule Take 81 mg by mouth Daily.       bumetanide (BUMEX) 1 MG tablet Take 1 tablet every day by oral route for 30 days. 30 tablet 0     bumetanide (BUMEX) 2 MG tablet Take 1 tablet by mouth Daily. Take 2 tablets in morning;1 tablet at night (Patient not taking: Reported on 3/27/2024)   Not Taking    busPIRone (BUSPAR) 10 MG tablet Take 1 tablet by mouth 3 (Three) Times a Day.       calcitriol (ROCALTROL) 0.5 MCG capsule Take 1 capsule by mouth Daily. 90 capsule 4     calcitriol (ROCALTROL) 0.5 MCG capsule Take 1 capsule every day by oral route for 90 days. (Patient not taking: Reported on 3/27/2024) 90 capsule 4 Not Taking    carvedilol (COREG) 3.125 MG tablet Take 1 tablet twice a day by oral route. 60 tablet 4     carvedilol (COREG) 6.25 MG tablet Take 1 tablet by mouth 2 (Two) Times a Day. (Patient not taking: Reported on 3/27/2024) 180 tablet 4 Not Taking    Cholecalciferol (D-5000) 125 MCG (5000 UT) tablet Take 1 tablet by mouth Daily Before Lunch. 30 tablet 2     cloNIDine (CATAPRES) 0.1 MG tablet Take 1 tablet by mouth Every 12 (Twelve) Hours. (Patient not taking: Reported on 3/27/2024) 60 tablet 2 Not Taking    Diclofenac Sodium (VOLTAREN) 1 % gel gel Apply 2 g topically to the appropriate area as directed 4 (Four) Times a Day As Needed. 300 g 11     Diclofenac Sodium (VOLTAREN) 1 % gel gel Apply 2 g topically to the appropriate area as  directed 4 (Four) Times a Day. (Patient not taking: Reported on 3/27/2024) 300 g 11 Not Taking    donepezil (ARICEPT) 10 MG tablet Take 1 tablet by mouth Daily. (Patient not taking: Reported on 3/27/2024) 90 tablet 4 Not Taking    Dulaglutide (Trulicity) 4.5 MG/0.5ML solution pen-injector Inject 0.5 mL under the skin into the appropriate area as directed 1 (One) Time Per Week. (Patient not taking: Reported on 3/27/2024) 2 mL 11 Not Taking    DULoxetine (CYMBALTA) 60 MG capsule Take 1 capsule by mouth Daily. 90 capsule 4     DULoxetine (CYMBALTA) 60 MG capsule Take 1 capsule by mouth Daily. (Patient not taking: Reported on 3/27/2024) 90 capsule 4 Not Taking    DULoxetine (CYMBALTA) 60 MG capsule Take 1 capsule by mouth Daily. (Patient not taking: Reported on 3/27/2024) 90 capsule 4 Not Taking    empagliflozin (JARDIANCE) 25 MG tablet tablet Take 1 tablet by mouth Daily. (Patient not taking: Reported on 3/27/2024) 30 tablet 2 Not Taking    famotidine (PEPCID) 20 MG tablet Take 1 tablet twice a day by oral route. 180 tablet 4     fluticasone (FLONASE) 50 MCG/ACT nasal spray 1 spray into the nostril(s) as directed by provider Daily. (Patient taking differently: 1 spray into the nostril(s) as directed by provider 2 (Two) Times a Day.) 16 g 1     HYDROcodone-acetaminophen (NORCO) 7.5-325 MG per tablet Take 1 tablet by mouth 2 (Two) Times a Day As Needed. (Patient not taking: Reported on 3/27/2024) 40 tablet 0 Not Taking    Insulin Glargine (Lantus SoloStar) 100 UNIT/ML injection pen Inject 20 Units under the skin into the appropriate area as directed Every Evening. 15 mL 3     Insulin Regular Human, Conc, (HumuLIN R U-500 KwikPen) 500 UNIT/ML solution pen-injector CONCENTRATED injection Inject 120 Units under the skin into the appropriate area as directed 2 (Two) Times a Day with breakfast and dinner. (Patient not taking: Reported on 3/27/2024) 18 mL 5 Not Taking    Insulin Regular Human, Conc, (HumuLIN R U-500 KwikPen)  500 UNIT/ML solution pen-injector CONCENTRATED injection Inject 40 Units under the skin into the appropriate area with regular meals AND 40 Units with large meals. 54 mL 3     isosorbide mononitrate (IMDUR) 30 MG 24 hr tablet Take 1 tablet by mouth Daily.       magnesium hydroxide (Milk of Magnesia) 400 MG/5ML suspension Take 30 mL every day by oral route for 30 days. 900 mL 0     magnesium hydroxide (Milk of Magnesia) 400 MG/5ML suspension Take 30mL by mouth once daily for 30 days. (Patient not taking: Reported on 3/27/2024) 900 mL 0 Not Taking    melatonin 3 MG tablet Take 1 tablet by mouth At Night As Needed for Sleep.       methylcellulose (Citrucel) oral powder Mix 5g as directed and drink twice daily for 90 days. 900 g 10     metoclopramide (REGLAN) 5 MG tablet Take 1 tablet by mouth 3 times a day.       midodrine (PROAMATINE) 2.5 MG tablet Take 1 tablet by mouth 3 (Three) Times a Day Before Meals.       nitroglycerin (NITROSTAT) 0.4 MG SL tablet Dissolve 1 tablet by mouth every 5 minutes as needed for chest pain; MAX 3 tabs/24 hours.  If no improvement after 3 tabs go to ER 25 tablet 0     ondansetron (ZOFRAN) 4 MG tablet Take 1 tablet by mouth Every 6 (Six) Hours As Needed for Nausea or Vomiting.       oxyCODONE (ROXICODONE) 10 MG tablet Take 1 tablet by mouth twice a day as needed 60 tablet 0     pantoprazole (Protonix) 40 MG EC tablet Take 1 tablet by mouth 2 (Two) Times a Day. (Patient not taking: Reported on 3/27/2024) 180 tablet 4 Not Taking    polyethylene glycol (MIRALAX) 17 g packet Take 17 g by mouth Daily. Obtain OTC       prazosin (MINIPRESS) 1 MG capsule Take 1 capsule by mouth every night at bedtime. 30 capsule 2     pregabalin (LYRICA) 100 MG capsule Take 2 capsules by mouth Every Night.       pregabalin (LYRICA) 50 MG capsule Take 1 capsule by mouth Daily.       rosuvastatin (CRESTOR) 10 MG tablet Take 1 tablet by mouth Daily.       sennosides-docusate (PERICOLACE) 8.6-50 MG per tablet Take 1  tablet by mouth Every Night. Obtain OTC       sennosides-docusate (PERICOLACE) 8.6-50 MG per tablet Take 1 tablet by mouth every night at bedtime. (Patient not taking: Reported on 3/27/2024) 30 tablet 2 Not Taking    sodium hypochlorite (DAKIN'S 1/4 STRENGTH) 0.125 % solution topical solution 0.125% Apply to affected area twice daily (Patient not taking: Reported on 3/27/2024) 473 mL 3 Not Taking    sodium hypochlorite (DAKIN'S 1/4 STRENGTH) 0.125 % solution topical solution 0.125% Apply to affected area twice daily as directed (Patient not taking: Reported on 3/27/2024) 473 mL 5 Not Taking    sodium hypochlorite (DAKIN'S) 0.25 % topical solution Use to wound daily as instructed 473 mL 1     sucralfate (CARAFATE) 1 g tablet Take 1 tablet by mouth 3 times a day.       tamsulosin (FLOMAX) 0.4 MG capsule 24 hr capsule Take 1 capsule by mouth Daily. 90 capsule 1     timolol (TIMOPTIC) 0.5 % ophthalmic solution Administer 1 drop to both eyes 2 (Two) Times a Day.       valsartan (DIOVAN) 40 MG tablet Take 1 tablet by mouth Daily.       zinc sulfate (ZINCATE) 50 MG capsule Take 1 capsule by mouth Daily.          Medicines:  Current Facility-Administered Medications   Medication Dose Route Frequency Provider Last Rate Last Admin    acetaminophen (TYLENOL) tablet 650 mg  650 mg Oral Q4H PRN Raquel Duncan APRN   650 mg at 04/04/24 0002    Or    acetaminophen (TYLENOL) 160 MG/5ML oral solution 650 mg  650 mg Oral Q4H PRN Raquel Duncan APRN   650 mg at 03/27/24 2109    Or    acetaminophen (TYLENOL) suppository 650 mg  650 mg Rectal Q4H PRN Raquel Duncan APRN        [Held by provider] apixaban (ELIQUIS) tablet 5 mg  5 mg Oral Q12H Raquel Duncan APRN   5 mg at 04/05/24 0933    ascorbic acid (VITAMIN C) tablet 1,000 mg  1,000 mg Oral Daily Rene Maloney MD   1,000 mg at 04/06/24 0933    [Held by provider] aspirin EC tablet 81 mg  81 mg Oral Daily Rene Maloney MD   81 mg at 04/05/24 0939     sennosides-docusate (PERICOLACE) 8.6-50 MG per tablet 1 tablet  1 tablet Oral BID Julia Adrian MD   1 tablet at 04/06/24 0934    And    polyethylene glycol (MIRALAX) packet 17 g  17 g Oral Daily Julia Adrian MD   17 g at 04/06/24 0932    And    bisacodyl (DULCOLAX) EC tablet 5 mg  5 mg Oral Daily PRN Julia Adrian MD        And    bisacodyl (DULCOLAX) suppository 10 mg  10 mg Rectal Daily PRN Julia Adrian MD        [Held by provider] bumetanide (BUMEX) tablet 1 mg  1 mg Oral Daily Stewart Alvarez APRN   1 mg at 04/04/24 0847    carvedilol (COREG) tablet 3.125 mg  3.125 mg Oral BID With Meals Rene Maloney MD   3.125 mg at 04/06/24 1718    dextrose (D50W) (25 g/50 mL) IV injection 25 g  25 g Intravenous Q15 Min PRN Raquel Duncan APRN        dextrose (GLUTOSE) oral gel 15 g  15 g Oral Q15 Min PRN Raquel Duncan APRN   15 g at 03/27/24 1710    Diclofenac Sodium (VOLTAREN) 1 % gel 2 g  2 g Topical 4x Daily PRN Rene Maloney MD        dicyclomine (BENTYL) capsule 20 mg  20 mg Oral TID PRN Rene Maloney MD   20 mg at 04/02/24 2234    donepezil (ARICEPT) tablet 10 mg  10 mg Oral Daily Raquel Duncan APRN   10 mg at 04/06/24 0934    glucagon (GLUCAGEN) injection 1 mg  1 mg Intramuscular Q15 Min PRN Raquel Duncan APRN        haloperidol lactate (HALDOL) injection 5 mg  5 mg Intravenous Q6H PRN Rene Maloney MD   5 mg at 04/03/24 0026    hydrocortisone (ANUSOL-HC) suppository 25 mg  25 mg Rectal BID Raymond Mederos MD   25 mg at 04/06/24 1137    insulin detemir (LEVEMIR) injection 30 Units  30 Units Subcutaneous Nightly Raquel Duncan APRN   30 Units at 04/05/24 2100    Insulin Lispro (humaLOG) injection 4-24 Units  4-24 Units Subcutaneous 4x Daily AC & at Bedtime Raquel Duncan APRN   4 Units at 04/06/24 1730    insulin regular (humuLIN R,novoLIN R) injection 10 Units  10 Units Subcutaneous TID AC Steve De Jesus MD   10  Units at 04/06/24 1340    isosorbide mononitrate (IMDUR) 24 hr tablet 30 mg  30 mg Oral Q24H Rene Maloney MD   30 mg at 04/06/24 0932    lactulose solution 20 g  20 g Oral TID Latanya Paez MD   20 g at 04/06/24 1719    linezolid (ZYVOX) tablet 600 mg  600 mg Oral Q12H Latanya Paez MD   600 mg at 04/06/24 0933    magnesium hydroxide (MILK OF MAGNESIA) suspension 10 mL  10 mL Oral Daily PRN Rene Maloney MD   10 mL at 04/03/24 0414    melatonin tablet 6 mg  6 mg Oral Nightly Rene Maloney MD   6 mg at 04/05/24 2034    midodrine (PROAMATINE) tablet 2.5 mg  2.5 mg Oral TID AC Rene Maloney MD   2.5 mg at 04/06/24 1718    Morphine sulfate (PF) injection 2 mg  2 mg Intravenous Q3H PRN Rene Maloney MD   2 mg at 04/05/24 0935    mupirocin (BACTROBAN) 2 % nasal ointment 1 Application  1 Application Each Nare BID Nadia Polo APRN   1 Application at 04/06/24 0934    nitroglycerin (NITROSTAT) SL tablet 0.4 mg  0.4 mg Sublingual Q5 Min PRN Raquel Duncan, APRN        ondansetron ODT (ZOFRAN-ODT) disintegrating tablet 4 mg  4 mg Oral Q6H PRN Raquel Duncan APRN   4 mg at 04/03/24 0537    Or    ondansetron (ZOFRAN) injection 4 mg  4 mg Intravenous Q6H PRN Raquel Duncan, APRN   4 mg at 03/29/24 1837    oxyCODONE (ROXICODONE) immediate release tablet 10 mg  10 mg Oral BID PRN Raquel Duncan APRN   10 mg at 04/06/24 1718    pantoprazole (PROTONIX) injection 40 mg  40 mg Intravenous Q12H Raymond Mederos MD   40 mg at 04/06/24 0934    [START ON 4/7/2024] polyethylene glycol (GoLYTELY) solution 1,000 mL  1,000 mL Oral Once Raymond Mederos MD        pregabalin (LYRICA) capsule 100 mg  100 mg Oral Nightly Rene Maloney MD   100 mg at 04/05/24 2036    pregabalin (LYRICA) capsule 50 mg  50 mg Oral Daily Rene Maloney MD   50 mg at 04/06/24 0933    rosuvastatin (CRESTOR) tablet 10 mg  10 mg Oral Nightly Rene Maloney MD   10 mg at 04/05/24 2036    sodium chloride  0.9 % flush 10 mL  10 mL Intravenous PRN Steve De Jesus MD        sodium chloride 0.9 % flush 10 mL  10 mL Intravenous Q12H Raquel Duncan APRN   10 mL at 04/06/24 0934    sodium chloride 0.9 % flush 10 mL  10 mL Intravenous PRN Raquel Duncan APRN        sodium chloride 0.9 % infusion 40 mL  40 mL Intravenous PRN Raquel Duncan, SUSAN        sucralfate (CARAFATE) 1 GM/10ML suspension 1 g  1 g Oral TID AC Rene Maloney MD   1 g at 04/06/24 1718    tamsulosin (FLOMAX) 24 hr capsule 0.4 mg  0.4 mg Oral Daily Rene Maloney MD   0.4 mg at 04/06/24 0933    timolol (TIMOPTIC) 0.25 % ophthalmic solution 1 drop  1 drop Both Eyes Q12H Rene Maloney MD   1 drop at 04/06/24 1340    zinc sulfate (ZINCATE) capsule 220 mg  220 mg Oral Daily Rene Maloney MD   220 mg at 04/06/24 0934       Past Medical History:  Past Medical History:   Diagnosis Date    Arthritis     Autonomic disease     CAD (coronary artery disease) 02/06/2017    Cervical radiculopathy 09/16/2021    Chronic constipation with acute exaccerbation 05/10/2021    Coronary artery disease     Degeneration of cervical intervertebral disc 08/11/2021    Diabetes mellitus     Diabetic foot ulcer 08/31/2020    Diabetic polyneuropathy associated with type 2 diabetes mellitus 01/18/2021    Elevated cholesterol     Gastroesophageal reflux disease 05/13/2019    Headache     HTN (hypertension), benign 05/03/2017    Hyperlipidemia     Hypertension     Mixed hyperlipidemia 02/07/2017    Multiple lung nodules 01/26/2020    5mm, 9 mm RLL identified 1/2020, not present 10/2019.    Myocardial infarction     Osteomyelitis 01/22/2020    Osteomyelitis of fifth toe of right foot 10/07/2019    Pancreatitis     Persistent insomnia 01/20/2020    Renal disorder     Sleep apnea 02/06/2017    Sleep apnea with use of continuous positive airway pressure (CPAP)     NON-COMPLIANT    Slow transit constipation 01/16/2019    Spinal stenosis in cervical region  2021    Vitamin D deficiency 2021       Past Surgical History:  Past Surgical History:   Procedure Laterality Date    ABDOMINAL SURGERY      AMPUTATION FOOT / TOE Left 10/2021    5th digit     ANTERIOR CERVICAL DISCECTOMY W/ FUSION N/A 2022    Procedure: CERVICAL DISCECTOMY ANTERIOR WITH FUSION C5-6 with possible plating of C5-7 with neuromonitoring and 1 c-arm;  Surgeon: Karel Soliz MD;  Location:  [facility] OR;  Service: Neurosurgery;  Laterality: N/A;    APPENDECTOMY      BACK SURGERY      CARDIAC CATHETERIZATION Left 2021    Procedure: Left Heart Cath w poss intervention left anatomical snuff box acess;  Surgeon: Omkar Charles DO;  Location:  [facility] CATH INVASIVE LOCATION;  Service: Cardiology;  Laterality: Left;    CARDIAC SURGERY      CATARACT EXTRACTION      CERVICAL SPINE SURGERY      COLONOSCOPY N/A 2017    Normal exam repeat in 5 years    COLONOSCOPY N/A 2019    Mild acute inflammation    COLONOSCOPY W/ POLYPECTOMY  2014    Hyperplastic polyp    CORONARY ARTERY BYPASS GRAFT  10/2015    ENDOSCOPY  2011    Gastritis with hemorrhage    ENDOSCOPY N/A 2017    Normal exam    ENDOSCOPY N/A 2019    Gastritis    ENDOSCOPY N/A 2020    Non-erosive gastritis with hemorrhage    ENDOSCOPY N/A 02/10/2021    Esophagitis    FOOT SURGERY Left     INCISION AND DRAINAGE OF WOUND Left 2007    spider bite       Family History  Family History   Problem Relation Age of Onset    Colon cancer Father     Heart disease Father     Colon cancer Sister     Colon polyps Sister     Alzheimer's disease Mother     Coronary artery disease Sister     Coronary artery disease Sister        Social History  Social History     Socioeconomic History    Marital status:    Tobacco Use    Smoking status: Former     Current packs/day: 0.00     Types: Cigarettes     Quit date:      Years since quittin.2    Smokeless tobacco: Never    Tobacco comments:      smoked in highschool   Vaping Use    Vaping status: Never Used   Substance and Sexual Activity    Alcohol use: No    Drug use: No    Sexual activity: Defer       Review of Systems:  History obtained from chart review and the patient  General ROS: No fever or chills  Respiratory ROS: No cough, shortness of breath, wheezing  Cardiovascular ROS: No chest pain or palpitations  Gastrointestinal ROS: No nausea or vomiting  Genito-Urinary ROS: No dysuria or hematuria  Psych ROS: No anxiety and depression  14 point ROS reviewed with the patient and negative except as noted above and in the HPI unless unable to obtain.    Objective:  Patient Vitals for the past 24 hrs:   BP Temp Temp src Pulse Resp SpO2 Weight   04/06/24 2027 127/53 97.4 °F (36.3 °C) Oral 60 16 100 % --   04/06/24 1530 119/62 97.5 °F (36.4 °C) Oral 70 18 100 % --   04/06/24 1135 150/79 96.5 °F (35.8 °C) Axillary 74 18 100 % --   04/06/24 0740 136/66 96.9 °F (36.1 °C) Axillary 71 18 100 % --   04/06/24 0318 139/75 97.7 °F (36.5 °C) Oral 63 18 100 % 131 kg (289 lb 3.2 oz)   04/05/24 2340 130/75 97.8 °F (36.6 °C) Oral 67 18 100 % --       Intake/Output Summary (Last 24 hours) at 4/6/2024 2057  Last data filed at 4/6/2024 1401  Gross per 24 hour   Intake 240 ml   Output 250 ml   Net -10 ml     General: awake/alert   Chest:  clear to auscultation bilaterally without respiratory distress  CVS: regular rate and rhythm  Abdominal: soft, nontender, positive bowel sounds  Extremities: ble edema  Skin: warm and dry without rash      Labs:  Results from last 7 days   Lab Units 04/06/24  1749 04/06/24  1344 04/06/24  0340 04/05/24  0512 04/04/24  0514   WBC 10*3/mm3  --   --  5.43 6.93 5.64   HEMOGLOBIN g/dL 9.5* 9.4* 9.5* 10.9* 10.0*   HEMATOCRIT % 30.0* 29.5* 30.2* 34.5* 31.3*   PLATELETS 10*3/mm3  --   --  294 321 251         Results from last 7 days   Lab Units 04/06/24  0340 04/05/24  0512 04/04/24  0514 04/01/24  0402 03/31/24  0526   SODIUM mmol/L 141 139  139  139   < > 136   POTASSIUM mmol/L 4.5 4.4  4.4 4.6   < > 3.6   CHLORIDE mmol/L 104 102  102 102   < > 101   CO2 mmol/L 28.0 25.0  25.0 26.0   < > 23.0   BUN mg/dL 33* 31*  31* 32*   < > 51*   CREATININE mg/dL 2.10* 1.98*  1.98* 1.78*   < > 2.52*   CALCIUM mg/dL 10.7* 11.1*  11.1* 10.5   < > 8.6   EGFR mL/min/1.73 33.9* 36.3*  36.3* 41.3*   < > 27.2*   BILIRUBIN mg/dL  --  0.4  --   --  0.5   ALK PHOS U/L  --  138*  --   --  110   ALT (SGPT) U/L  --  18  --   --  19   AST (SGOT) U/L  --  22  --   --  30   GLUCOSE mg/dL 86 173*  173* 185*   < > 201*    < > = values in this interval not displayed.       Radiology:   Imaging Results (Last 72 Hours)       Procedure Component Value Units Date/Time    NM GI Blood Loss [[id number]] Collected: 04/06/24 1327     Updated: 04/06/24 1334    Narrative:      EXAM: NM GI BLOOD LOSS-     INDICATION: Lower GI bleed (Ped 0-17y)     RADIOPHARMACEUTICAL: Tc-99m labeled red cells 27.6 mCi IV     TECHNICAL: The patient's red blood cells were labeled in vitro and  reinjected.  Sequential five-minute anterior planar images of the  abdomen were obtained for two hours.  These were also reformatted into  cine mode.     COMPARISON: CT abdomen pelvis 7/22/2023     FINDINGS:     No evidence of an active GI bleed.       Impression:         No evidence of an active GI bleed.      This report was signed and finalized on 4/6/2024 1:31 PM by Ronal Wisdom.               Culture:  Blood Culture   Date Value Ref Range Status   04/01/2024 No growth at 24 hours  Preliminary   03/30/2024 No growth at 3 days  Preliminary   03/29/2024 Staphylococcus aureus, MRSA (C)  Final     Comment:       Methicillin resistant Staphylococcus aureus, Patient may be an isolation risk.  Infectious disease consultation is highly recommended to rule out distant foci of infection.         Assessment   Acute kidney injury  Chronic kidney disease stage IIIb  Diabetes type 2 with diabetic  nephropathy  Hypertension  Hypokalemia  Anemia and chronic kidney disease  MRSA bacteremia     Plan:  Discussed with patient, nursing, family  Workup reviewed today  ID and podiatry evaluations reviewed  Holding Bumex for now-planning to restart tomorrow or the following day given bowel prep plans  Antibiotics per ID recommendations        Willy Arteaga MD  4/6/2024  20:57 CDT

## 2024-04-07 NOTE — PLAN OF CARE
Goal Outcome Evaluation:  Plan of Care Reviewed With: patient           Outcome Evaluation: pt AOx4 this shift; colonoscopy today; up in chair; clear liquid diet; VSS; accu checks; safety maintained

## 2024-04-08 LAB
ALBUMIN SERPL-MCNC: 3.3 G/DL (ref 3.5–5.2)
ALBUMIN/GLOB SERPL: 1.3 G/DL
ALP SERPL-CCNC: 107 U/L (ref 39–117)
ALT SERPL W P-5'-P-CCNC: 14 U/L (ref 1–41)
ANION GAP SERPL CALCULATED.3IONS-SCNC: 12 MMOL/L (ref 5–15)
AST SERPL-CCNC: 16 U/L (ref 1–40)
BASOPHILS # BLD AUTO: 0.04 10*3/MM3 (ref 0–0.2)
BASOPHILS NFR BLD AUTO: 0.6 % (ref 0–1.5)
BILIRUB SERPL-MCNC: 0.4 MG/DL (ref 0–1.2)
BUN SERPL-MCNC: 30 MG/DL (ref 8–23)
BUN/CREAT SERPL: 18.6 (ref 7–25)
CALCIUM SPEC-SCNC: 9.1 MG/DL (ref 8.6–10.5)
CHLORIDE SERPL-SCNC: 104 MMOL/L (ref 98–107)
CO2 SERPL-SCNC: 25 MMOL/L (ref 22–29)
CREAT SERPL-MCNC: 1.61 MG/DL (ref 0.76–1.27)
DEPRECATED RDW RBC AUTO: 45.1 FL (ref 37–54)
EGFRCR SERPLBLD CKD-EPI 2021: 46.6 ML/MIN/1.73
EOSINOPHIL # BLD AUTO: 0.16 10*3/MM3 (ref 0–0.4)
EOSINOPHIL NFR BLD AUTO: 2.6 % (ref 0.3–6.2)
ERYTHROCYTE [DISTWIDTH] IN BLOOD BY AUTOMATED COUNT: 14 % (ref 12.3–15.4)
GLOBULIN UR ELPH-MCNC: 2.6 GM/DL
GLUCOSE BLDC GLUCOMTR-MCNC: 163 MG/DL (ref 70–130)
GLUCOSE BLDC GLUCOMTR-MCNC: 77 MG/DL (ref 70–130)
GLUCOSE BLDC GLUCOMTR-MCNC: 86 MG/DL (ref 70–130)
GLUCOSE BLDC GLUCOMTR-MCNC: 98 MG/DL (ref 70–130)
GLUCOSE SERPL-MCNC: 82 MG/DL (ref 65–99)
HCT VFR BLD AUTO: 24.3 % (ref 37.5–51)
HCT VFR BLD AUTO: 24.7 % (ref 37.5–51)
HCT VFR BLD AUTO: 27 % (ref 37.5–51)
HCT VFR BLD AUTO: 27.5 % (ref 37.5–51)
HGB BLD-MCNC: 7.8 G/DL (ref 13–17.7)
HGB BLD-MCNC: 8.4 G/DL (ref 13–17.7)
HGB BLD-MCNC: 8.7 G/DL (ref 13–17.7)
HGB BLD-MCNC: 8.8 G/DL (ref 13–17.7)
IMM GRANULOCYTES # BLD AUTO: 0.02 10*3/MM3 (ref 0–0.05)
IMM GRANULOCYTES NFR BLD AUTO: 0.3 % (ref 0–0.5)
LYMPHOCYTES # BLD AUTO: 1.37 10*3/MM3 (ref 0.7–3.1)
LYMPHOCYTES NFR BLD AUTO: 22.1 % (ref 19.6–45.3)
MCH RBC QN AUTO: 28.2 PG (ref 26.6–33)
MCHC RBC AUTO-ENTMCNC: 32.2 G/DL (ref 31.5–35.7)
MCV RBC AUTO: 87.7 FL (ref 79–97)
MONOCYTES # BLD AUTO: 0.54 10*3/MM3 (ref 0.1–0.9)
MONOCYTES NFR BLD AUTO: 8.7 % (ref 5–12)
NEUTROPHILS NFR BLD AUTO: 4.06 10*3/MM3 (ref 1.7–7)
NEUTROPHILS NFR BLD AUTO: 65.7 % (ref 42.7–76)
NRBC BLD AUTO-RTO: 0 /100 WBC (ref 0–0.2)
PLATELET # BLD AUTO: 246 10*3/MM3 (ref 140–450)
PMV BLD AUTO: 10.1 FL (ref 6–12)
POTASSIUM SERPL-SCNC: 4 MMOL/L (ref 3.5–5.2)
PROT SERPL-MCNC: 5.9 G/DL (ref 6–8.5)
RBC # BLD AUTO: 3.08 10*6/MM3 (ref 4.14–5.8)
SODIUM SERPL-SCNC: 141 MMOL/L (ref 136–145)
WBC NRBC COR # BLD AUTO: 6.19 10*3/MM3 (ref 3.4–10.8)

## 2024-04-08 PROCEDURE — 85014 HEMATOCRIT: CPT | Performed by: HOSPITALIST

## 2024-04-08 PROCEDURE — 63710000001 INSULIN DETEMIR PER 5 UNITS: Performed by: INTERNAL MEDICINE

## 2024-04-08 PROCEDURE — 85025 COMPLETE CBC W/AUTO DIFF WBC: CPT | Performed by: INTERNAL MEDICINE

## 2024-04-08 PROCEDURE — 80053 COMPREHEN METABOLIC PANEL: CPT | Performed by: HOSPITALIST

## 2024-04-08 PROCEDURE — 99231 SBSQ HOSP IP/OBS SF/LOW 25: CPT | Performed by: NURSE PRACTITIONER

## 2024-04-08 PROCEDURE — 99232 SBSQ HOSP IP/OBS MODERATE 35: CPT | Performed by: INTERNAL MEDICINE

## 2024-04-08 PROCEDURE — 97530 THERAPEUTIC ACTIVITIES: CPT

## 2024-04-08 PROCEDURE — 82948 REAGENT STRIP/BLOOD GLUCOSE: CPT

## 2024-04-08 PROCEDURE — 63710000001 INSULIN REGULAR HUMAN PER 5 UNITS: Performed by: INTERNAL MEDICINE

## 2024-04-08 PROCEDURE — 63710000001 ONDANSETRON ODT 4 MG TABLET DISPERSIBLE: Performed by: INTERNAL MEDICINE

## 2024-04-08 PROCEDURE — 97168 OT RE-EVAL EST PLAN CARE: CPT

## 2024-04-08 PROCEDURE — 85018 HEMOGLOBIN: CPT | Performed by: HOSPITALIST

## 2024-04-08 PROCEDURE — 97535 SELF CARE MNGMENT TRAINING: CPT

## 2024-04-08 PROCEDURE — 97116 GAIT TRAINING THERAPY: CPT

## 2024-04-08 RX ORDER — BUMETANIDE 1 MG/1
2 TABLET ORAL DAILY
Status: DISCONTINUED | OUTPATIENT
Start: 2024-04-09 | End: 2024-04-11 | Stop reason: HOSPADM

## 2024-04-08 RX ORDER — SODIUM CHLORIDE 0.9 % (FLUSH) 0.9 %
10 SYRINGE (ML) INJECTION EVERY 12 HOURS SCHEDULED
Status: DISCONTINUED | OUTPATIENT
Start: 2024-04-08 | End: 2024-04-11 | Stop reason: HOSPADM

## 2024-04-08 RX ORDER — SODIUM CHLORIDE 0.9 % (FLUSH) 0.9 %
10 SYRINGE (ML) INJECTION AS NEEDED
Status: DISCONTINUED | OUTPATIENT
Start: 2024-04-08 | End: 2024-04-11 | Stop reason: HOSPADM

## 2024-04-08 RX ORDER — SODIUM CHLORIDE 9 MG/ML
40 INJECTION, SOLUTION INTRAVENOUS AS NEEDED
Status: DISCONTINUED | OUTPATIENT
Start: 2024-04-08 | End: 2024-04-11 | Stop reason: HOSPADM

## 2024-04-08 RX ORDER — SODIUM CHLORIDE 9 MG/ML
100 INJECTION, SOLUTION INTRAVENOUS CONTINUOUS
Status: DISCONTINUED | OUTPATIENT
Start: 2024-04-08 | End: 2024-04-11 | Stop reason: HOSPADM

## 2024-04-08 RX ADMIN — LINEZOLID 600 MG: 600 TABLET, FILM COATED ORAL at 09:09

## 2024-04-08 RX ADMIN — TIMOLOL MALEATE 1 DROP: 2.5 SOLUTION/ DROPS OPHTHALMIC at 21:07

## 2024-04-08 RX ADMIN — OXYCODONE HYDROCHLORIDE 10 MG: 5 TABLET ORAL at 21:15

## 2024-04-08 RX ADMIN — CARVEDILOL 3.12 MG: 3.12 TABLET, FILM COATED ORAL at 17:56

## 2024-04-08 RX ADMIN — OXYCODONE HYDROCHLORIDE AND ACETAMINOPHEN 1000 MG: 500 TABLET ORAL at 09:08

## 2024-04-08 RX ADMIN — INSULIN DETEMIR 30 UNITS: 100 INJECTION, SOLUTION SUBCUTANEOUS at 21:06

## 2024-04-08 RX ADMIN — DOCUSATE SODIUM AND SENNOSIDES 1 TABLET: 8.6; 5 TABLET, FILM COATED ORAL at 21:07

## 2024-04-08 RX ADMIN — LINEZOLID 600 MG: 600 TABLET, FILM COATED ORAL at 21:06

## 2024-04-08 RX ADMIN — BUMETANIDE 1 MG: 1 TABLET ORAL at 09:08

## 2024-04-08 RX ADMIN — SUCRALFATE 1 G: 1 SUSPENSION ORAL at 11:49

## 2024-04-08 RX ADMIN — SUCRALFATE 1 G: 1 SUSPENSION ORAL at 09:13

## 2024-04-08 RX ADMIN — TIMOLOL MALEATE 1 DROP: 2.5 SOLUTION/ DROPS OPHTHALMIC at 09:11

## 2024-04-08 RX ADMIN — ISOSORBIDE MONONITRATE 30 MG: 30 TABLET, EXTENDED RELEASE ORAL at 09:08

## 2024-04-08 RX ADMIN — CARVEDILOL 3.12 MG: 3.12 TABLET, FILM COATED ORAL at 09:08

## 2024-04-08 RX ADMIN — INSULIN HUMAN 10 UNITS: 100 INJECTION, SOLUTION PARENTERAL at 09:19

## 2024-04-08 RX ADMIN — LACTULOSE 20 G: 20 SOLUTION ORAL at 17:56

## 2024-04-08 RX ADMIN — ONDANSETRON 4 MG: 4 TABLET, ORALLY DISINTEGRATING ORAL at 21:15

## 2024-04-08 RX ADMIN — PREGABALIN 100 MG: 100 CAPSULE ORAL at 21:06

## 2024-04-08 RX ADMIN — SUCRALFATE 1 G: 1 SUSPENSION ORAL at 17:56

## 2024-04-08 RX ADMIN — MIDODRINE HYDROCHLORIDE 2.5 MG: 2.5 TABLET ORAL at 09:11

## 2024-04-08 RX ADMIN — Medication 1 APPLICATION: at 09:09

## 2024-04-08 RX ADMIN — TAMSULOSIN HYDROCHLORIDE 0.4 MG: 0.4 CAPSULE ORAL at 09:10

## 2024-04-08 RX ADMIN — MIDODRINE HYDROCHLORIDE 2.5 MG: 2.5 TABLET ORAL at 11:49

## 2024-04-08 RX ADMIN — PREGABALIN 50 MG: 50 CAPSULE ORAL at 09:18

## 2024-04-08 RX ADMIN — Medication 6 MG: at 21:06

## 2024-04-08 RX ADMIN — Medication 1 APPLICATION: at 21:06

## 2024-04-08 RX ADMIN — OXYCODONE HYDROCHLORIDE 10 MG: 5 TABLET ORAL at 09:18

## 2024-04-08 RX ADMIN — LACTULOSE 20 G: 20 SOLUTION ORAL at 09:10

## 2024-04-08 RX ADMIN — ZINC SULFATE 220 MG (50 MG) CAPSULE 220 MG: CAPSULE at 09:09

## 2024-04-08 RX ADMIN — MIDODRINE HYDROCHLORIDE 2.5 MG: 2.5 TABLET ORAL at 19:41

## 2024-04-08 RX ADMIN — DONEPEZIL HYDROCHLORIDE 10 MG: 10 TABLET, FILM COATED ORAL at 09:11

## 2024-04-08 RX ADMIN — LACTULOSE 20 G: 20 SOLUTION ORAL at 21:06

## 2024-04-08 RX ADMIN — ROSUVASTATIN CALCIUM 10 MG: 10 TABLET, FILM COATED ORAL at 21:06

## 2024-04-08 NOTE — THERAPY TREATMENT NOTE
Acute Care - Physical Therapy Treatment Note  [facility]     Patient Name: Erick Luong  : 1955  MRN: [id number]  Today's Date: 2024      Visit Dx:     ICD-10-CM ICD-9-CM   1. Diabetic foot infection  E11.628 250.80    L08.9 686.9   2. Poorly controlled diabetes mellitus  E11.65 250.00   3. Impaired functional mobility and activity tolerance [Z74.09]  Z74.09 V49.89   4. Rectal bleeding  K62.5 569.3   5. Anemia due to stage 3 chronic kidney disease, unspecified whether stage 3a or 3b CKD  N18.30 285.21    D63.1 585.3     Patient Active Problem List   Diagnosis    Obesity, unspecified obesity severity, unspecified obesity type    Essential hypertension    Type 2 diabetes mellitus with hyperglycemia, with long-term current use of insulin    Nonsmoker    Anemia due to chronic kidney disease    Class 3 severe obesity due to excess calories with body mass index (BMI) of 40.0 to 44.9 in adult    Anasarca    Sleep apnea with use of continuous positive airway pressure (CPAP)    Medically noncompliant    Diabetic ulcer of left midfoot associated with type 2 diabetes mellitus, with fat layer exposed    Diabetic polyneuropathy associated with type 2 diabetes mellitus    Spinal stenosis in cervical region    Degeneration of cervical intervertebral disc    Cervical radiculopathy    Degeneration of lumbar or lumbosacral intervertebral disc    Cervical myelopathy    Bilateral carpal tunnel syndrome    CAD (coronary artery disease)    GERD without esophagitis    BPH without obstruction/lower urinary tract symptoms    Stage 3b chronic kidney disease    Chronic diastolic heart failure    Type 2 myocardial infarction due to heart failure    Left carpal tunnel syndrome    Syncope and collapse, recurrent episodes    Poorly-controlled hypertension    Rhinovirus    Peripheral vascular disease    Chronic kidney disease (CKD), stage IV (severe)    Diabetic foot infection    Sepsis     Past Medical History:   Diagnosis Date     Arthritis     Autonomic disease     CAD (coronary artery disease) 02/06/2017    Cervical radiculopathy 09/16/2021    Chronic constipation with acute exaccerbation 05/10/2021    Coronary artery disease     Degeneration of cervical intervertebral disc 08/11/2021    Diabetes mellitus     Diabetic foot ulcer 08/31/2020    Diabetic polyneuropathy associated with type 2 diabetes mellitus 01/18/2021    Elevated cholesterol     Gastroesophageal reflux disease 05/13/2019    Headache     HTN (hypertension), benign 05/03/2017    Hyperlipidemia     Hypertension     Mixed hyperlipidemia 02/07/2017    Multiple lung nodules 01/26/2020    5mm, 9 mm RLL identified 1/2020, not present 10/2019.    Myocardial infarction     Osteomyelitis 01/22/2020    Osteomyelitis of fifth toe of right foot 10/07/2019    Pancreatitis     Persistent insomnia 01/20/2020    Renal disorder     Sleep apnea 02/06/2017    Sleep apnea with use of continuous positive airway pressure (CPAP)     NON-COMPLIANT    Slow transit constipation 01/16/2019    Spinal stenosis in cervical region 09/16/2021    Vitamin D deficiency 03/02/2021     Past Surgical History:   Procedure Laterality Date    ABDOMINAL SURGERY      AMPUTATION FOOT / TOE Left 10/2021    5th digit     ANTERIOR CERVICAL DISCECTOMY W/ FUSION N/A 8/5/2022    Procedure: CERVICAL DISCECTOMY ANTERIOR WITH FUSION C5-6 with possible plating of C5-7 with neuromonitoring and 1 c-arm;  Surgeon: Karel Soliz MD;  Location:  [facility] OR;  Service: Neurosurgery;  Laterality: N/A;    APPENDECTOMY      BACK SURGERY      CARDIAC CATHETERIZATION Left 02/08/2021    Procedure: Left Heart Cath w poss intervention left anatomical snuff box acess;  Surgeon: Omkar Charles DO;  Location:  [facility] CATH INVASIVE LOCATION;  Service: Cardiology;  Laterality: Left;    CARDIAC SURGERY      CATARACT EXTRACTION      CERVICAL SPINE SURGERY      COLONOSCOPY N/A 01/31/2017    Normal exam repeat in 5 years    COLONOSCOPY N/A  02/11/2019    Mild acute inflammation    COLONOSCOPY N/A 4/7/2024    Procedure: COLONOSCOPY WITH ANESTHESIA;  Surgeon: Raymond Mederos MD;  Location: [facility] OR;  Service: Gastroenterology;  Laterality: N/A;  Pre: Anemia, Rectal bleed;  Post: Visible vessel at 20cm, clip x6 placed;  Del Shetty MD    COLONOSCOPY W/ POLYPECTOMY  03/04/2014    Hyperplastic polyp    CORONARY ARTERY BYPASS GRAFT  10/2015    ENDOSCOPY  04/13/2011    Gastritis with hemorrhage    ENDOSCOPY N/A 05/05/2017    Normal exam    ENDOSCOPY N/A 02/11/2019    Gastritis    ENDOSCOPY N/A 09/01/2020    Non-erosive gastritis with hemorrhage    ENDOSCOPY N/A 02/10/2021    Esophagitis    FOOT SURGERY Left     INCISION AND DRAINAGE OF WOUND Left 09/2007    spider bite     PT Assessment (Last 12 Hours)       PT Evaluation and Treatment       Row Name 04/08/24 1007          Physical Therapy Time and Intention    Subjective Information complains of;pain  -LY     Document Type therapy note (daily note)  -LY     Mode of Treatment physical therapy  -LY       Row Name 04/08/24 1007          General Information    Existing Precautions/Restrictions fall  cam boot L foot when up  -LY       Row Name 04/08/24 1007          Pain    Pretreatment Pain Rating 6/10  -LY     Posttreatment Pain Rating 6/10  -LY     Pain Location - abdomen  -LY     Pain Intervention(s) Medication (See MAR);Ambulation/increased activity  -LY       Row Name 04/08/24 1007          Sit-Stand Transfer    Sit-Stand [place] (Transfers) verbal cues;contact guard  -LY     Assistive Device (Sit-Stand Transfers) walker, front-wheeled  -LY       Row Name 04/08/24 1007          Stand-Sit Transfer    Stand-Sit [place] (Transfers) verbal cues;contact guard  -LY     Assistive Device (Stand-Sit Transfers) walker, front-wheeled  -LY       Row Name 04/08/24 1007          Toilet Transfer    Type (Toilet Transfer) sit-stand;stand-sit  -LY     [place] Level (Toilet Transfer) contact  guard;verbal cues  -LY     Assistive Device (Toilet Transfer) commode, bedside without drop arms;walker, front-wheeled  -LY       Row Name 04/08/24 1007          Gait/Stairs (Locomotion)    [place] Level (Gait) verbal cues;contact guard  -LY     Assistive Device (Gait) walker, front-wheeled  -LY     Distance in Feet (Gait) 50  -LY     Bilateral Gait Deviations forward flexed posture;heel strike decreased  -LY       Row Name             Wound 06/15/23 0102 Left anterior plantar    Wound - Properties Group Placement Date: 06/15/23  -SM Placement Time: 0102  -SM Side: Left  -SM Orientation: anterior  -SM Location: plantar  -SM    Retired Wound - Properties Group Placement Date: 06/15/23  -SM Placement Time: 0102  -SM Side: Left  -SM Orientation: anterior  -SM Location: plantar  -SM    Retired Wound - Properties Group Date first assessed: 06/15/23  -SM Time first assessed: 0102  -SM Side: Left  -SM Location: plantar  -SM      Row Name             Wound 08/22/23 0140 Left posterior plantar    Wound - Properties Group Placement Date: 08/22/23  -AM Placement Time: 0140  -AM Present on Original Admission: Y  -AM Side: Left  -AM Orientation: posterior  -AM Location: plantar  -AM    Retired Wound - Properties Group Placement Date: 08/22/23  -AM Placement Time: 0140  -AM Present on Original Admission: Y  -AM Side: Left  -AM Orientation: posterior  -AM Location: plantar  -AM    Retired Wound - Properties Group Date first assessed: 08/22/23  -AM Time first assessed: 0140  -AM Present on Original Admission: Y  -AM Side: Left  -AM Location: plantar  -AM      Row Name             Wound Left lateral foot Diabetic Ulcer    Wound - Properties Group Side: Left  -MH Orientation: lateral  -MH Location: foot  -JACIEL, 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;diabetic foot ulcer/gangrene  Primary Wound Type: Diabetic ulc  -JACIEL Wound Outcome: Amputation  -JACIEL Stage, Pressure Injury : other (see comments)  -JACIEL, full thickness starting 10/20/21     Retired  Wound - Properties Group Side: Left  -MH Orientation: lateral  -MH Location: foot  -JACIEL, 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;diabetic foot ulcer/gangrene  Primary Wound Type: Diabetic ulc  -JACIEL Stage, Pressure Injury : other (see comments)  -JACIEL, full thickness starting 10/20/21  Wound Outcome: Amputation  -JACIEL    Retired Wound - Properties Group Side: Left  -MH Location: foot  -JACIEL, 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;diabetic foot ulcer/gangrene  Primary Wound Type: Diabetic ulc  -JACIEL Wound Outcome: Amputation  -JACIEL      Row Name             Wound 04/04/24 2100 Left posterior wrist Skin Tear    Wound - Properties Group Placement Date: 04/04/24  -NR Placement Time: 2100  -NR Present on Original Admission: N  -NR Side: Left  -NR Orientation: posterior  -NR Location: wrist  -NR Primary Wound Type: Skin tear  -NR Additional Comments: likely caused by wristband. Band removed, site cleaned with alcohol swab, wraped in gauze  -NR    Retired Wound - Properties Group Placement Date: 04/04/24  -NR Placement Time: 2100  -NR Present on Original Admission: N  -NR Side: Left  -NR Orientation: posterior  -NR Location: wrist  -NR Primary Wound Type: Skin tear  -NR Additional Comments: likely caused by wristband. Band removed, site cleaned with alcohol swab, wraped in gauze  -NR    Retired Wound - Properties Group Date first assessed: 04/04/24  -NR Time first assessed: 2100  -NR Present on Original Admission: N  -NR Side: Left  -NR Location: wrist  -NR Primary Wound Type: Skin tear  -NR Additional Comments: likely caused by wristband. Band removed, site cleaned with alcohol swab, wraped in gauze  -NR      Row Name 04/08/24 1007          Plan of Care Review    Plan of Care Reviewed With patient  -LY     Progress improving  -LY       Row Name 04/08/24 1007          Positioning and Restraints    Pre-Treatment Position in bed  -LY     Post Treatment Position bed  -LY     In Bed fowlers;call light within reach;encouraged to call for assist;exit alarm on  -LY                User Key  (r) = Recorded By, (t) = Taken By, (c) = Cosigned By      Initials Name Provider Type    Yuliana Lisa RN Registered Nurse    MH Haase, Mallory L, RN Registered Nurse    Alesha Armenta, PTA Physical Therapist Assistant    Faviola Kunz RN Registered Nurse    Michelle Diaz RN Registered Nurse    Reuben Johnson RN Registered Nurse                    Physical Therapy Education       Title: PT OT SLP Therapies (In Progress)       Topic: Physical Therapy (In Progress)       Point: Mobility training (Done)       Learning Progress Summary             Patient Acceptance, E,TB,D, VU,NR by  at 3/29/2024 1009    Comment: education re: purpose of PT/importance of activity, safety/falls prevention, NWB L LE, improved tech w/ bed mobility, positioning for pressure relief                         Point: Home exercise program (Not Started)       Learner Progress:  Not documented in this visit.              Point: Precautions (Done)       Learning Progress Summary             Patient Acceptance, E,TB,D, VU,NR by  at 3/29/2024 1009    Comment: education re: purpose of PT/importance of activity, safety/falls prevention, NWB L LE, improved tech w/ bed mobility, positioning for pressure relief                                         User Key       Initials Effective Dates Name Provider Type Discipline     08/02/18 -  Melly Mustafa, PT Physical Therapist PT                  PT Recommendation and Plan     Plan of Care Reviewed With: patient  Progress: improving   Outcome Measures       Row Name 04/06/24 1400             How much help from another person do you currently need...    Turning from your back to your side while in flat bed without using bedrails? 4  -AH      Moving from lying on back to sitting on the side of a flat bed without bedrails? 4  -AH      Moving to and from a bed to a chair (including a wheelchair)? 3  -AH      Standing up from a chair using your arms  (e.g., wheelchair, bedside chair)? 3  -      Climbing 3-5 steps with a railing? 3  -      To walk in hospital room? 3  -      AM-PAC 6 Clicks Score (PT) 20  -      Highest Level of Mobility Goal 6 --> Walk 10 steps or more  -         Functional Assessment    Outcome Measure Options AM-PAC 6 Clicks Basic Mobility (PT)  -                User Key  (r) = Recorded By, (t) = Taken By, (c) = Cosigned By      Initials Name Provider Type     Chery Rosado PTA Physical Therapist Assistant                     Time Calculation:    PT Charges       Row Name 04/08/24 1535             Time Calculation    Start Time 1007  -LY      Stop Time 1031  -LY      Time Calculation (min) 24 min  -LY      PT Received On 04/08/24  -LY         Time Calculation- PT    Total Timed Code Minutes- PT 24 minute(s)  -LY         Timed Charges    97935 - Gait Training Minutes  12  -LY      96562 - PT Therapeutic Activity Minutes 12  -LY         Total Minutes    Timed Charges Total Minutes 24  -LY       Total Minutes 24  -LY                User Key  (r) = Recorded By, (t) = Taken By, (c) = Cosigned By      Initials Name Provider Type    LY Alesha Dominguez PTA Physical Therapist Assistant                  Therapy Charges for Today       Code Description Service Date Service Provider Modifiers Qty    69041543883 HC GAIT TRAINING EA 15 MIN 4/8/2024 Alesha Dominguez PTA GP 1    64268703573 HC PT THERAPEUTIC ACT EA 15 MIN 4/8/2024 Alesha Dominguez PTA GP 1            PT G-Codes  Outcome Measure Options: AM-PAC 6 Clicks Basic Mobility (PT)  AM-PAC 6 Clicks Score (PT): 20  AM-PAC 6 Clicks Score (OT): 16    Alesha Dominguez PTA  4/8/2024

## 2024-04-08 NOTE — NURSING NOTE
Discussed no IV access and difficulty obtaining with Dr Maloney who informed that it is ok to leave without IV assess due to no medications ordered IV route.

## 2024-04-08 NOTE — PROGRESS NOTES
Nephrology ([facility]) Progress Note      Patient:  Erick Luong  YOB: 1955  Date of Service: 4/8/2024  MRN: [id number]   Acct: [id number]   Primary Care Physician: Del Shetty MD  Advance Directive:   Code Status and Medical Interventions:   Ordered at: 03/26/24 1815     Level Of Support Discussed With:    Health Care Surrogate     Code Status (Patient has no pulse and is not breathing):    CPR (Attempt to Resuscitate)     Medical Interventions (Patient has pulse or is breathing):    Full Support     Admit Date: 3/26/2024       Hospital Day: 13  Referring Provider: No ref. provider found      Patient personally seen and examined.  Complete chart including Consults, Notes, Operative Reports, Labs, Cardiology, and Radiology studies reviewed as able.        Subjective:  Erick Luong is a 68 y.o. male for whom we were consulted for evaluation and treatment of acute kidney injury.  He has stage IIIb chronic kidney disease baseline, follows with Dr Lomas in our office.  Baseline creatinine approximately 1.8. He has history of insulin-dependent type 2 diabetes, hypertension, abdominal obesity, obstructive sleep apnea, diabetic foot ulcer and coronary artery disease.   Patient presented to ER on 3/26 with altered mental status. Had debridement of ongoing wound on left foot the day prior. Left foot warm and erythremic on arrival to ER. Noted to have elevated blood glucose over 400. Initial creatinine of 1.9.  Admitted to medical floor for sepsis due to left foot wound. Patient has been agitated and confused during the admission, requiring wrist restraints due to pulling at lines and tubes. Renal function and mentation have been improving. Now has a sitter present in room due to repeatedly getting up unattended. He is s/p colonoscopy on 4/07 due to rectal bleding.     Today is awake and alert. No new complaint or overnight issues. Urine output nonoliguric      Allergies:  Cefepime, Bactrim [sulfamethoxazole-trimethoprim], Vancomycin, Zolpidem, Zolpidem tartrate, and Metronidazole    Home Meds:  Medications Prior to Admission   Medication Sig Dispense Refill Last Dose    amitriptyline (ELAVIL) 25 MG tablet Take 2 tablets by mouth Daily at bedtime. (Patient not taking: Reported on 3/27/2024) 60 tablet 1 Not Taking    apixaban (ELIQUIS) 5 MG tablet tablet Take 1 tablet by mouth Every 12 (Twelve) Hours.       ascorbic acid (VITAMIN C) 1000 MG tablet Take 1 tablet by mouth Daily. 30 tablet 3     Aspirin 81 MG capsule Take 81 mg by mouth Daily.       bumetanide (BUMEX) 1 MG tablet Take 1 tablet every day by oral route for 30 days. 30 tablet 0     bumetanide (BUMEX) 2 MG tablet Take 1 tablet by mouth Daily. Take 2 tablets in morning;1 tablet at night (Patient not taking: Reported on 3/27/2024)   Not Taking    busPIRone (BUSPAR) 10 MG tablet Take 1 tablet by mouth 3 (Three) Times a Day.       calcitriol (ROCALTROL) 0.5 MCG capsule Take 1 capsule by mouth Daily. 90 capsule 4     calcitriol (ROCALTROL) 0.5 MCG capsule Take 1 capsule every day by oral route for 90 days. (Patient not taking: Reported on 3/27/2024) 90 capsule 4 Not Taking    carvedilol (COREG) 3.125 MG tablet Take 1 tablet twice a day by oral route. 60 tablet 4     carvedilol (COREG) 6.25 MG tablet Take 1 tablet by mouth 2 (Two) Times a Day. (Patient not taking: Reported on 3/27/2024) 180 tablet 4 Not Taking    Cholecalciferol (D-5000) 125 MCG (5000 UT) tablet Take 1 tablet by mouth Daily Before Lunch. 30 tablet 2     cloNIDine (CATAPRES) 0.1 MG tablet Take 1 tablet by mouth Every 12 (Twelve) Hours. (Patient not taking: Reported on 3/27/2024) 60 tablet 2 Not Taking    Diclofenac Sodium (VOLTAREN) 1 % gel gel Apply 2 g topically to the appropriate area as directed 4 (Four) Times a Day As Needed. 300 g 11     Diclofenac Sodium (VOLTAREN) 1 % gel gel Apply 2 g topically to the appropriate area as directed 4 (Four)  Times a Day. (Patient not taking: Reported on 3/27/2024) 300 g 11 Not Taking    donepezil (ARICEPT) 10 MG tablet Take 1 tablet by mouth Daily. (Patient not taking: Reported on 3/27/2024) 90 tablet 4 Not Taking    Dulaglutide (Trulicity) 4.5 MG/0.5ML solution pen-injector Inject 0.5 mL under the skin into the appropriate area as directed 1 (One) Time Per Week. (Patient not taking: Reported on 3/27/2024) 2 mL 11 Not Taking    DULoxetine (CYMBALTA) 60 MG capsule Take 1 capsule by mouth Daily. 90 capsule 4     DULoxetine (CYMBALTA) 60 MG capsule Take 1 capsule by mouth Daily. (Patient not taking: Reported on 3/27/2024) 90 capsule 4 Not Taking    DULoxetine (CYMBALTA) 60 MG capsule Take 1 capsule by mouth Daily. (Patient not taking: Reported on 3/27/2024) 90 capsule 4 Not Taking    empagliflozin (JARDIANCE) 25 MG tablet tablet Take 1 tablet by mouth Daily. (Patient not taking: Reported on 3/27/2024) 30 tablet 2 Not Taking    famotidine (PEPCID) 20 MG tablet Take 1 tablet twice a day by oral route. 180 tablet 4     fluticasone (FLONASE) 50 MCG/ACT nasal spray 1 spray into the nostril(s) as directed by provider Daily. (Patient taking differently: 1 spray into the nostril(s) as directed by provider 2 (Two) Times a Day.) 16 g 1     HYDROcodone-acetaminophen (NORCO) 7.5-325 MG per tablet Take 1 tablet by mouth 2 (Two) Times a Day As Needed. (Patient not taking: Reported on 3/27/2024) 40 tablet 0 Not Taking    Insulin Glargine (Lantus SoloStar) 100 UNIT/ML injection pen Inject 20 Units under the skin into the appropriate area as directed Every Evening. 15 mL 3     Insulin Regular Human, Conc, (HumuLIN R U-500 KwikPen) 500 UNIT/ML solution pen-injector CONCENTRATED injection Inject 120 Units under the skin into the appropriate area as directed 2 (Two) Times a Day with breakfast and dinner. (Patient not taking: Reported on 3/27/2024) 18 mL 5 Not Taking    Insulin Regular Human, Conc, (HumuLIN R U-500 KwikPen) 500 UNIT/ML  solution pen-injector CONCENTRATED injection Inject 40 Units under the skin into the appropriate area with regular meals AND 40 Units with large meals. 54 mL 3     isosorbide mononitrate (IMDUR) 30 MG 24 hr tablet Take 1 tablet by mouth Daily.       magnesium hydroxide (Milk of Magnesia) 400 MG/5ML suspension Take 30 mL every day by oral route for 30 days. 900 mL 0     magnesium hydroxide (Milk of Magnesia) 400 MG/5ML suspension Take 30mL by mouth once daily for 30 days. (Patient not taking: Reported on 3/27/2024) 900 mL 0 Not Taking    melatonin 3 MG tablet Take 1 tablet by mouth At Night As Needed for Sleep.       methylcellulose (Citrucel) oral powder Mix 5g as directed and drink twice daily for 90 days. 900 g 10     metoclopramide (REGLAN) 5 MG tablet Take 1 tablet by mouth 3 times a day.       midodrine (PROAMATINE) 2.5 MG tablet Take 1 tablet by mouth 3 (Three) Times a Day Before Meals.       nitroglycerin (NITROSTAT) 0.4 MG SL tablet Dissolve 1 tablet by mouth every 5 minutes as needed for chest pain; MAX 3 tabs/24 hours.  If no improvement after 3 tabs go to ER 25 tablet 0     ondansetron (ZOFRAN) 4 MG tablet Take 1 tablet by mouth Every 6 (Six) Hours As Needed for Nausea or Vomiting.       oxyCODONE (ROXICODONE) 10 MG tablet Take 1 tablet by mouth twice a day as needed 60 tablet 0     pantoprazole (Protonix) 40 MG EC tablet Take 1 tablet by mouth 2 (Two) Times a Day. (Patient not taking: Reported on 3/27/2024) 180 tablet 4 Not Taking    polyethylene glycol (MIRALAX) 17 g packet Take 17 g by mouth Daily. Obtain OTC       prazosin (MINIPRESS) 1 MG capsule Take 1 capsule by mouth every night at bedtime. 30 capsule 2     pregabalin (LYRICA) 100 MG capsule Take 2 capsules by mouth Every Night.       pregabalin (LYRICA) 50 MG capsule Take 1 capsule by mouth Daily.       rosuvastatin (CRESTOR) 10 MG tablet Take 1 tablet by mouth Daily.       sennosides-docusate (PERICOLACE) 8.6-50 MG per tablet Take 1 tablet by  mouth Every Night. Obtain OTC       sennosides-docusate (PERICOLACE) 8.6-50 MG per tablet Take 1 tablet by mouth every night at bedtime. (Patient not taking: Reported on 3/27/2024) 30 tablet 2 Not Taking    sodium hypochlorite (DAKIN'S 1/4 STRENGTH) 0.125 % solution topical solution 0.125% Apply to affected area twice daily (Patient not taking: Reported on 3/27/2024) 473 mL 3 Not Taking    sodium hypochlorite (DAKIN'S 1/4 STRENGTH) 0.125 % solution topical solution 0.125% Apply to affected area twice daily as directed (Patient not taking: Reported on 3/27/2024) 473 mL 5 Not Taking    sodium hypochlorite (DAKIN'S) 0.25 % topical solution Use to wound daily as instructed 473 mL 1     sucralfate (CARAFATE) 1 g tablet Take 1 tablet by mouth 3 times a day.       tamsulosin (FLOMAX) 0.4 MG capsule 24 hr capsule Take 1 capsule by mouth Daily. 90 capsule 1     timolol (TIMOPTIC) 0.5 % ophthalmic solution Administer 1 drop to both eyes 2 (Two) Times a Day.       valsartan (DIOVAN) 40 MG tablet Take 1 tablet by mouth Daily.       zinc sulfate (ZINCATE) 50 MG capsule Take 1 capsule by mouth Daily.          Medicines:  Current Facility-Administered Medications   Medication Dose Route Frequency Provider Last Rate Last Admin    acetaminophen (TYLENOL) tablet 650 mg  650 mg Oral Q4H PRN Raymond Mederos MD   650 mg at 04/04/24 0002    Or    acetaminophen (TYLENOL) 160 MG/5ML oral solution 650 mg  650 mg Oral Q4H PRN Raymond Mederos MD   650 mg at 03/27/24 2109    Or    acetaminophen (TYLENOL) suppository 650 mg  650 mg Rectal Q4H PRN Raymond Mederos MD        [Held by provider] apixaban (ELIQUIS) tablet 5 mg  5 mg Oral Q12H Raquel Duncan APRN   5 mg at 04/05/24 0933    ascorbic acid (VITAMIN C) tablet 1,000 mg  1,000 mg Oral Daily Raymond Mederos MD   1,000 mg at 04/08/24 0908    [Held by provider] aspirin EC tablet 81 mg  81 mg Oral Daily Rene Maloney MD   81 mg at 04/05/24 0933    sennosides-docusate (PERICOLACE)  8.6-50 MG per tablet 1 tablet  1 tablet Oral BID Raymond Mederos MD   1 tablet at 04/07/24 2050    And    polyethylene glycol (MIRALAX) packet 17 g  17 g Oral Daily Raymond Mederos MD   17 g at 04/06/24 0932    And    bisacodyl (DULCOLAX) EC tablet 5 mg  5 mg Oral Daily PRN Raymond Mederos MD        And    bisacodyl (DULCOLAX) suppository 10 mg  10 mg Rectal Daily PRN Raymond Mederos MD        bumetanide (BUMEX) tablet 1 mg  1 mg Oral Daily Willy Arteaga MD   1 mg at 04/08/24 0908    carvedilol (COREG) tablet 3.125 mg  3.125 mg Oral BID With Meals Rene Maloney MD   3.125 mg at 04/08/24 0908    dextrose (D50W) (25 g/50 mL) IV injection 25 g  25 g Intravenous Q15 Min PRN Raymond Mederos MD        dextrose (GLUTOSE) oral gel 15 g  15 g Oral Q15 Min PRN Raymond Mederos MD   15 g at 03/27/24 1710    Diclofenac Sodium (VOLTAREN) 1 % gel 2 g  2 g Topical 4x Daily PRN Raymond Mederos MD        dicyclomine (BENTYL) capsule 20 mg  20 mg Oral TID PRN Raymond Mederos MD   20 mg at 04/07/24 2050    donepezil (ARICEPT) tablet 10 mg  10 mg Oral Daily Raymond Mederos MD   10 mg at 04/08/24 0911    [Transfer Hold] glucagon (GLUCAGEN) injection 1 mg  1 mg Intramuscular Q15 Min PRN Raquel Duncan APRN        hydrocortisone (ANUSOL-HC) suppository 25 mg  25 mg Rectal BID Raymond Mederos MD   25 mg at 04/08/24 0912    insulin detemir (LEVEMIR) injection 30 Units  30 Units Subcutaneous Nightly Raymond Mederos MD   30 Units at 04/07/24 2135    Insulin Lispro (humaLOG) injection 4-24 Units  4-24 Units Subcutaneous 4x Daily AC & at Bedtime Raymond Mederos MD   8 Units at 04/07/24 2136    insulin regular (humuLIN R,novoLIN R) injection 10 Units  10 Units Subcutaneous TID AC Raymond Mederos MD   10 Units at 04/08/24 0919    isosorbide mononitrate (IMDUR) 24 hr tablet 30 mg  30 mg Oral Q24H Raymond Mederos MD   30 mg at 04/08/24 0908    lactulose solution 20 g  20 g Oral TID Raymond Mederos MD   20 g at 04/08/24 0910     linezolid (ZYVOX) tablet 600 mg  600 mg Oral Q12H Raymond Mederos MD   600 mg at 04/08/24 0909    magnesium hydroxide (MILK OF MAGNESIA) suspension 10 mL  10 mL Oral Daily PRN Raymond Mederos MD   10 mL at 04/03/24 0414    melatonin tablet 6 mg  6 mg Oral Nightly Raymond Mederos MD   6 mg at 04/07/24 2050    midodrine (PROAMATINE) tablet 2.5 mg  2.5 mg Oral TID AC Raymond Mederos MD   2.5 mg at 04/08/24 0911    mupirocin (BACTROBAN) 2 % nasal ointment 1 Application  1 Application Each Nare BID Raymond Mederos MD   1 Application at 04/08/24 0909    nitroglycerin (NITROSTAT) SL tablet 0.4 mg  0.4 mg Sublingual Q5 Min PRN Raymond Mederos MD        ondansetron ODT (ZOFRAN-ODT) disintegrating tablet 4 mg  4 mg Oral Q6H PRN Raymond Mederos MD   4 mg at 04/03/24 0537    Or    ondansetron (ZOFRAN) injection 4 mg  4 mg Intravenous Q6H PRN Raymond Mederos MD   4 mg at 03/29/24 1837    oxyCODONE (ROXICODONE) immediate release tablet 10 mg  10 mg Oral BID PRN Raymnod Mederos MD   10 mg at 04/08/24 0918    pregabalin (LYRICA) capsule 100 mg  100 mg Oral Nightly Reen Maloney MD   100 mg at 04/07/24 2050    pregabalin (LYRICA) capsule 50 mg  50 mg Oral Daily Rene Maloney MD   50 mg at 04/08/24 0918    rosuvastatin (CRESTOR) tablet 10 mg  10 mg Oral Nightly Raymond Mederos MD   10 mg at 04/07/24 2050    sodium chloride 0.9 % flush 10 mL  10 mL Intravenous PRN Raymond Mederos MD        sodium chloride 0.9 % flush 10 mL  10 mL Intravenous Q12H Raymond Mederos MD   10 mL at 04/07/24 2050    sodium chloride 0.9 % flush 10 mL  10 mL Intravenous PRN Raymond Mederos MD        sodium chloride 0.9 % infusion 40 mL  40 mL Intravenous PRN Raymond Mederos MD        sucralfate (CARAFATE) 1 GM/10ML suspension 1 g  1 g Oral TID AC Raymond Mederos MD   1 g at 04/08/24 0913    tamsulosin (FLOMAX) 24 hr capsule 0.4 mg  0.4 mg Oral Daily Raymond Mederos MD   0.4 mg at 04/08/24 0910    timolol (TIMOPTIC) 0.25 % ophthalmic solution 1 drop   1 drop Both Eyes Q12H Raymond Mederos MD   1 drop at 04/08/24 0911    zinc sulfate (ZINCATE) capsule 220 mg  220 mg Oral Daily Raymond Mederos MD   220 mg at 04/08/24 0909       Past Medical History:  Past Medical History:   Diagnosis Date    Arthritis     Autonomic disease     CAD (coronary artery disease) 02/06/2017    Cervical radiculopathy 09/16/2021    Chronic constipation with acute exaccerbation 05/10/2021    Coronary artery disease     Degeneration of cervical intervertebral disc 08/11/2021    Diabetes mellitus     Diabetic foot ulcer 08/31/2020    Diabetic polyneuropathy associated with type 2 diabetes mellitus 01/18/2021    Elevated cholesterol     Gastroesophageal reflux disease 05/13/2019    Headache     HTN (hypertension), benign 05/03/2017    Hyperlipidemia     Hypertension     Mixed hyperlipidemia 02/07/2017    Multiple lung nodules 01/26/2020    5mm, 9 mm RLL identified 1/2020, not present 10/2019.    Myocardial infarction     Osteomyelitis 01/22/2020    Osteomyelitis of fifth toe of right foot 10/07/2019    Pancreatitis     Persistent insomnia 01/20/2020    Renal disorder     Sleep apnea 02/06/2017    Sleep apnea with use of continuous positive airway pressure (CPAP)     NON-COMPLIANT    Slow transit constipation 01/16/2019    Spinal stenosis in cervical region 09/16/2021    Vitamin D deficiency 03/02/2021       Past Surgical History:  Past Surgical History:   Procedure Laterality Date    ABDOMINAL SURGERY      AMPUTATION FOOT / TOE Left 10/2021    5th digit     ANTERIOR CERVICAL DISCECTOMY W/ FUSION N/A 8/5/2022    Procedure: CERVICAL DISCECTOMY ANTERIOR WITH FUSION C5-6 with possible plating of C5-7 with neuromonitoring and 1 c-arm;  Surgeon: Karel Soliz MD;  Location: [facility] OR;  Service: Neurosurgery;  Laterality: N/A;    APPENDECTOMY      BACK SURGERY      CARDIAC CATHETERIZATION Left 02/08/2021    Procedure: Left Heart Cath w poss intervention left anatomical snuff box acess;  Surgeon:  Omkar Charles DO;  Location:  [facility] CATH INVASIVE [facility]ATION;  Service: Cardiology;  Laterality: Left;    CARDIAC SURGERY      CATARACT EXTRACTION      CERVICAL SPINE SURGERY      COLONOSCOPY N/A 2017    Normal exam repeat in 5 years    COLONOSCOPY N/A 2019    Mild acute inflammation    COLONOSCOPY N/A 2024    Procedure: COLONOSCOPY WITH ANESTHESIA;  Surgeon: Raymond Mederos MD;  Location:  [facility] OR;  Service: Gastroenterology;  Laterality: N/A;  Pre: Anemia, Rectal bleed;  Post: Visible vessel at 20cm, clip x6 placed;  Del Shetty MD    COLONOSCOPY W/ POLYPECTOMY  2014    Hyperplastic polyp    CORONARY ARTERY BYPASS GRAFT  10/2015    ENDOSCOPY  2011    Gastritis with hemorrhage    ENDOSCOPY N/A 2017    Normal exam    ENDOSCOPY N/A 2019    Gastritis    ENDOSCOPY N/A 2020    Non-erosive gastritis with hemorrhage    ENDOSCOPY N/A 02/10/2021    Esophagitis    FOOT SURGERY Left     INCISION AND DRAINAGE OF WOUND Left 2007    spider bite       Family History  Family History   Problem Relation Age of Onset    Colon cancer Father     Heart disease Father     Colon cancer Sister     Colon polyps Sister     Alzheimer's disease Mother     Coronary artery disease Sister     Coronary artery disease Sister        Social History  Social History     Socioeconomic History    Marital status:    Tobacco Use    Smoking status: Former     Current packs/day: 0.00     Types: Cigarettes     Quit date:      Years since quittin.2    Smokeless tobacco: Never    Tobacco comments:     smoked in [employer]   Vaping Use    Vaping status: Never Used   Substance and Sexual Activity    Alcohol use: No    Drug use: No    Sexual activity: Defer       Review of Systems:  History obtained from chart review and the patient  General ROS: No fever or chills  Respiratory ROS: No cough, shortness of breath, wheezing  Cardiovascular ROS: No chest pain or  palpitations  Gastrointestinal ROS: No abdominal pain or melena  Genito-Urinary ROS: No dysuria or hematuria  Psych ROS: No anxiety and depression  14 point ROS reviewed with the patient and negative except as noted above and in the HPI unless unable to obtain.    Objective:  Patient Vitals for the past 24 hrs:   BP Temp Temp src Pulse Resp SpO2   04/08/24 0726 136/52 98 °F (36.7 °C) Axillary 70 16 97 %   04/08/24 0427 119/51 98.2 °F (36.8 °C) Oral 67 16 100 %   04/07/24 2029 112/48 97.8 °F (36.6 °C) Oral 70 16 96 %   04/07/24 1609 143/62 97.4 °F (36.3 °C) Oral 72 16 99 %   04/07/24 1422 137/66 97.2 °F (36.2 °C) Oral 70 16 98 %   04/07/24 1343 125/65 96.8 °F (36 °C) Oral 63 14 98 %   04/07/24 1335 125/65 97 °F (36.1 °C) Temporal 68 14 98 %   04/07/24 1325 140/91 -- -- 65 14 100 %   04/07/24 1320 127/67 -- -- 59 14 100 %   04/07/24 1315 124/73 -- -- 67 12 99 %   04/07/24 1310 162/81 -- -- 68 12 100 %   04/07/24 1309 162/81 97.2 °F (36.2 °C) Temporal 67 12 100 %       Intake/Output Summary (Last 24 hours) at 4/8/2024 1131  Last data filed at 4/8/2024 1100  Gross per 24 hour   Intake 240 ml   Output 500 ml   Net -260 ml     General: awake/alert   Chest:  clear to auscultation bilaterally without respiratory distress  CVS: regular rate and rhythm  Abdominal: soft, nontender, positive bowel sounds  Extremities:  bilateral 1+ edema  Skin: warm and dry without rash      Labs:  Results from last 7 days   Lab Units 04/08/24  0623 04/07/24  2333 04/07/24  1954 04/07/24  1429 04/07/24  0422 04/06/24  1344 04/06/24  0340   WBC 10*3/mm3 6.19  --   --   --  5.70  --  5.43   HEMOGLOBIN g/dL 8.7* 8.4* 8.4*   < > 9.2*   < > 9.5*   HEMATOCRIT % 27.0* 24.7* 25.8*   < > 29.5*   < > 30.2*   PLATELETS 10*3/mm3 246  --   --   --  275  --  294    < > = values in this interval not displayed.         Results from last 7 days   Lab Units 04/08/24  0623 04/07/24  0422 04/06/24  0340 04/05/24  0512   SODIUM mmol/L 141 141 141 139  139    POTASSIUM mmol/L 4.0 4.0 4.5 4.4  4.4   CHLORIDE mmol/L 104 103 104 102  102   CO2 mmol/L 25.0 27.0 28.0 25.0  25.0   BUN mg/dL 30* 33* 33* 31*  31*   CREATININE mg/dL 1.61* 1.75* 2.10* 1.98*  1.98*   CALCIUM mg/dL 9.1 10.1 10.7* 11.1*  11.1*   EGFR mL/min/1.73 46.6* 42.1* 33.9* 36.3*  36.3*   BILIRUBIN mg/dL 0.4  --   --  0.4   ALK PHOS U/L 107  --   --  138*   ALT (SGPT) U/L 14  --   --  18   AST (SGOT) U/L 16  --   --  22   GLUCOSE mg/dL 82 94 86 173*  173*       Radiology:   Imaging Results (Last 72 Hours)       Procedure Component Value Units Date/Time    NM GI Blood Loss [[id number]] Collected: 04/06/24 1327     Updated: 04/06/24 1334    Narrative:      EXAM: NM GI BLOOD LOSS-     INDICATION: Lower GI bleed (Ped 0-17y)     RADIOPHARMACEUTICAL: Tc-99m labeled red cells 27.6 mCi IV     TECHNICAL: The patient's red blood cells were labeled in vitro and  reinjected.  Sequential five-minute anterior planar images of the  abdomen were obtained for two hours.  These were also reformatted into  cine mode.     COMPARISON: CT abdomen pelvis 7/22/2023     FINDINGS:     No evidence of an active GI bleed.       Impression:         No evidence of an active GI bleed.      This report was signed and finalized on 4/6/2024 1:31 PM by Ronal Wisdom.               Culture:  Blood Culture   Date Value Ref Range Status   03/26/2024 Staphylococcus aureus, MRSA (C)  Final     Comment:       Infectious disease consultation is highly recommended to rule out distant foci of infection.  Methicillin resistant Staphylococcus aureus, Patient may be an isolation risk.   03/26/2024 Staphylococcus aureus, MRSA (C)  Final     Comment:       Infectious disease consultation is highly recommended to rule out distant foci of infection.  Methicillin resistant Staphylococcus aureus, Patient may be an isolation risk.         Assessment    Acute kidney injury, ATN--resolving  Baseline chronic kidney disease stage 3b  Type 2 diabetes, poorly  controlled (A1c 10.8%)  Hypertension   Metabolic encephalopathy--improved  Anemia of CKD  Hypokalemia--improved  Sepsis due to infection of left foot wound  Hypercalcemia--improving    Plan:  Renal function stable  Monitor jamar Alvarez, APRN  4/8/2024  11:31 CDT

## 2024-04-08 NOTE — PAYOR COMM NOTE
"4/8/24 [facility] [phone]  [phone]      FAXING DAILY CLINICAL FROM 4/6/24 THRU 4/8/24 FOR CONT STAY.                Erick Luong (68 y.o. Male)       Date of Birth   1955    Social Security Number       Address   [address] [facility] [address] [place] [address]    Home Phone   [phone]    MRN   [id number]       Faith   [facility]    Marital Status                               Admission Date   3/26/24    Admission Type   Emergency    Admitting Provider   Rene Maloney MD    Attending Provider   Rene Maloney MD    Department, Room/Bed   [facility] 3C, 372/1       Discharge Date       Discharge Disposition       Discharge Destination                                 Attending Provider: Rene Maloney MD    Allergies: Cefepime, Bactrim [Sulfamethoxazole-trimethoprim], Vancomycin, Zolpidem, Zolpidem Tartrate, Metronidazole    Isolation: Contact   Infection: MRSA (05/19/19), COVID (History) (08/08/22)   Code Status: CPR    Ht: 182.9 cm (72\")   Wt: 131 kg (289 lb 3.2 oz)    Admission Cmt: None   Principal Problem: Sepsis [A41.9]                   Active Insurance as of 3/26/2024       Primary Coverage       Payor Plan Insurance Group Employer/Plan Group    [facility] BLUE CROSS [employer] EMPLOYEE [id number]       Payor Plan Address Payor Plan Phone Number Payor Plan Fax Number Effective Dates    [address] [phone]  1/1/2022 - None Entered    [facility] [id number]         Subscriber Name Subscriber Birth Date Member ID       ZAINAB LUONG 11/27/1969 [id number]               Secondary Coverage       Payor Plan Insurance Group Employer/Plan Group    MEDICARE MEDICARE A & B        Payor Plan Address Payor Plan Phone Number Payor Plan Fax Number Effective Dates    [address] [phone]  7/1/2013 - None Entered    [facility] [id number]         Subscriber Name Subscriber Birth Date Member ID       ERICK LUONG 1955 [id number]       "               Emergency Contacts        (Rel.) Home Phone Work Phone Mobile Phone    Joan Luong (Spouse) [phone]             Patient Had a Fall This Encounter  View Post Fall Documentation and Orders     [facility] Encounter Date/Time: 3/26/2024 [facility]   Hospital Account: [id number]    MRN: [id number]   Patient:  Erick Luong   Contact Serial #: [id number]   SSN:          ENCOUNTER             Patient Class: Inpatient   Unit: [address] Service: Medicine     Bed: 372/1   Admitting Provider: Rene Maloney MD   Referring Physician:     Attending Provider: Rene Maloney MD   Adm Diagnosis: Diabetic foot infection *               PATIENT             Name: Erick Luong : 1955 (68 yrs)   Address: [address] [place] [address] 1948 Sex: Male   City: [place] [address]   County: [facility]   Marital Status:  Ethnicity: NOT                                                                         Race: WHITE   Primary Care Provider: Del Shetty MD Patients Phone: Home Phone: [phone]     Mobile Phone: [phone]     EMERGENCY CONTACT   Contact Name Legal Guardian? Relationship to Patient Home Phone Work Phone Mobile Phone   1. Cinthya Luongs  2. *No Contact Specified* No    Spouse    [phone]      GUARANTOR             Guarantor: Erick Luong     : 1955   Address: [address] [place], [place] [address] Sex: [place] [address]     Relation to Patient: Self       Home Phone: [phone]   Guarantor ID: [id number]       Work Phone:     GUARANTOR EMPLOYER   Employer:           Status: DISABLED   COVERAGE          PRIMARY INSURANCE   Payor: [employer]UE CR[employer] Plan: [employer]   Group Number: [id number] Insurance Type: INDEMNITY   Subscriber Name: JOAN LUONG Subscriber : 1969   Subscriber ID: [id number] Coverage Address: [facility] [id number]  [place] [address]   Pat. Rel. to  "Subscriber: Spouse Coverage Phone: [phone]   SECONDARY INSURANCE   Payor: MEDICARE Plan: MEDICARE A & B   Group Number:   Insurance Type: INDEMNITY   Subscriber Name: ERICK LUONG Subscriber : 1955   Subscriber ID: [id number] Coverage Address: [facility] [id number] [place] [address]   Pat. Rel. to Subscriber: SELF Coverage Phone: [phone]      Contact Serial # ([id number])         2024    Chart ID ([id number])           Raymond Mederos MD   Physician  Gastroenterology     Consults     Signed     Date of Service: 24  Creation Time: 24     Signed       Expand All [facility] All               [facility] Gastroenterology  Inpatient Consult Note  Today's date:  24     Erikc Luong  1955         Referring Provider: No ref. provider found  Primary Physician: Del Shetty MD   Primary Gastroenterologist: Unknown     Date of Admission: 3/26/2024  Date of Service:  24     Reason for Consultation/Chief Complaint: Rectal bleeding     History of present illness: 68-year-old patient with multiple medical problems.  He is in the hospital for diabetic foot infection.  He had 1 episode of red rectal bleeding yesterday.  His hemoglobin was 10.9 and is now 9.5.  There has been no further rectal bleeding today or last night per the nursing staff.  The patient was on aspirin and Eliquis which are now on hold.  He did have an upper endoscopy in  which showed esophagitis and a colonoscopy in 2019 showed a suspected \"infectious colitis\".  He does have chronic constipation.  He is eating his breakfast today and is on a solid diet and tolerating.  He denies abdominal pain, nausea, vomiting, hematemesis, melena, fevers, chills, diarrhea or constipation.  The patient was sitting up in a chair and eating breakfast.     Medical History[]Expand by Default        Past Medical History:   Diagnosis Date    Arthritis      Autonomic disease      CAD " (coronary artery disease) 02/06/2017    Cervical radiculopathy 09/16/2021    Chronic constipation with acute exaccerbation 05/10/2021    Coronary artery disease      Degeneration of cervical intervertebral disc 08/11/2021    Diabetes mellitus      Diabetic foot ulcer 08/31/2020    Diabetic polyneuropathy associated with type 2 diabetes mellitus 01/18/2021    Elevated cholesterol      Gastroesophageal reflux disease 05/13/2019    Headache      HTN (hypertension), benign 05/03/2017    Hyperlipidemia      Hypertension      Mixed hyperlipidemia 02/07/2017    Multiple lung nodules 01/26/2020     5mm, 9 mm RLL identified 1/2020, not present 10/2019.    Myocardial infarction      Osteomyelitis 01/22/2020    Osteomyelitis of fifth toe of right foot 10/07/2019    Pancreatitis      Persistent insomnia 01/20/2020    Renal disorder      Sleep apnea 02/06/2017    Sleep apnea with use of continuous positive airway pressure (CPAP)       NON-COMPLIANT    Slow transit constipation 01/16/2019    Spinal stenosis in cervical region 09/16/2021    Vitamin D deficiency 03/02/2021            Surgical History         Past Surgical History:   Procedure Laterality Date    ABDOMINAL SURGERY        AMPUTATION FOOT / TOE Left 10/2021     5th digit     ANTERIOR CERVICAL DISCECTOMY W/ FUSION N/A 8/5/2022     Procedure: CERVICAL DISCECTOMY ANTERIOR WITH FUSION C5-6 with possible plating of C5-7 with neuromonitoring and 1 c-arm;  Surgeon: Karel Soliz MD;  Location:  [facility] OR;  Service: Neurosurgery;  Laterality: N/A;    APPENDECTOMY        BACK SURGERY        CARDIAC CATHETERIZATION Left 02/08/2021     Procedure: Left Heart Cath w poss intervention left anatomical snuff box acess;  Surgeon: Omkar Charles DO;  Location:  [facility] CATH INVASIVE LOCATION;  Service: Cardiology;  Laterality: Left;    CARDIAC SURGERY        CATARACT EXTRACTION        CERVICAL SPINE SURGERY        COLONOSCOPY N/A 01/31/2017     Normal exam repeat in 5 years  "   COLONOSCOPY N/A 02/11/2019     Mild acute inflammation    COLONOSCOPY W/ POLYPECTOMY   03/04/2014     Hyperplastic polyp    CORONARY ARTERY BYPASS GRAFT   10/2015    ENDOSCOPY   04/13/2011     Gastritis with hemorrhage    ENDOSCOPY N/A 05/05/2017     Normal exam    ENDOSCOPY N/A 02/11/2019     Gastritis    ENDOSCOPY N/A 09/01/2020     Non-erosive gastritis with hemorrhage    ENDOSCOPY N/A 02/10/2021     Esophagitis    FOOT SURGERY Left      INCISION AND DRAINAGE OF WOUND Left 09/2007     spider bite            Allergies         Allergies   Allergen Reactions    Cefepime Hives and Anaphylaxis    Bactrim [Sulfamethoxazole-Trimethoprim] Other (See Comments)       \"RENAL FAILURE\"    Vancomycin Itching    Zolpidem Unknown - High Severity       \"makes him crazy\"    Zolpidem Tartrate Unknown - Low Severity and Provider Review Needed    Metronidazole Rash            Prescriptions Prior to Admission           Medications Prior to Admission   Medication Sig Dispense Refill Last Dose    amitriptyline (ELAVIL) 25 MG tablet Take 2 tablets by mouth Daily at bedtime. (Patient not taking: Reported on 3/27/2024) 60 tablet 1 Not Taking    apixaban (ELIQUIS) 5 MG tablet tablet Take 1 tablet by mouth Every 12 (Twelve) Hours.          ascorbic acid (VITAMIN C) 1000 MG tablet Take 1 tablet by mouth Daily. 30 tablet 3      Aspirin 81 MG capsule Take 81 mg by mouth Daily.          bumetanide (BUMEX) 1 MG tablet Take 1 tablet every day by oral route for 30 days. 30 tablet 0      bumetanide (BUMEX) 2 MG tablet Take 1 tablet by mouth Daily. Take 2 tablets in morning;1 tablet at night (Patient not taking: Reported on 3/27/2024)     Not Taking    busPIRone (BUSPAR) 10 MG tablet Take 1 tablet by mouth 3 (Three) Times a Day.          calcitriol (ROCALTROL) 0.5 MCG capsule Take 1 capsule by mouth Daily. 90 capsule 4      calcitriol (ROCALTROL) 0.5 MCG capsule Take 1 capsule every day by oral route for 90 days. (Patient not taking: Reported on " 3/27/2024) 90 capsule 4 Not Taking    carvedilol (COREG) 3.125 MG tablet Take 1 tablet twice a day by oral route. 60 tablet 4      carvedilol (COREG) 6.25 MG tablet Take 1 tablet by mouth 2 (Two) Times a Day. (Patient not taking: Reported on 3/27/2024) 180 tablet 4 Not Taking    Cholecalciferol (D-5000) 125 MCG (5000 UT) tablet Take 1 tablet by mouth Daily Before Lunch. 30 tablet 2      cloNIDine (CATAPRES) 0.1 MG tablet Take 1 tablet by mouth Every 12 (Twelve) Hours. (Patient not taking: Reported on 3/27/2024) 60 tablet 2 Not Taking    Diclofenac Sodium (VOLTAREN) 1 % gel gel Apply 2 g topically to the appropriate area as directed 4 (Four) Times a Day As Needed. 300 g 11      Diclofenac Sodium (VOLTAREN) 1 % gel gel Apply 2 g topically to the appropriate area as directed 4 (Four) Times a Day. (Patient not taking: Reported on 3/27/2024) 300 g 11 Not Taking    donepezil (ARICEPT) 10 MG tablet Take 1 tablet by mouth Daily. (Patient not taking: Reported on 3/27/2024) 90 tablet 4 Not Taking    Dulaglutide (Trulicity) 4.5 MG/0.5ML solution pen-injector Inject 0.5 mL under the skin into the appropriate area as directed 1 (One) Time Per Week. (Patient not taking: Reported on 3/27/2024) 2 mL 11 Not Taking    DULoxetine (CYMBALTA) 60 MG capsule Take 1 capsule by mouth Daily. 90 capsule 4      DULoxetine (CYMBALTA) 60 MG capsule Take 1 capsule by mouth Daily. (Patient not taking: Reported on 3/27/2024) 90 capsule 4 Not Taking    DULoxetine (CYMBALTA) 60 MG capsule Take 1 capsule by mouth Daily. (Patient not taking: Reported on 3/27/2024) 90 capsule 4 Not Taking    empagliflozin (JARDIANCE) 25 MG tablet tablet Take 1 tablet by mouth Daily. (Patient not taking: Reported on 3/27/2024) 30 tablet 2 Not Taking    famotidine (PEPCID) 20 MG tablet Take 1 tablet twice a day by oral route. 180 tablet 4      fluticasone (FLONASE) 50 MCG/ACT nasal spray 1 spray into the nostril(s) as directed by provider Daily. (Patient taking  differently: 1 spray into the nostril(s) as directed by provider 2 (Two) Times a Day.) 16 g 1      HYDROcodone-acetaminophen (NORCO) 7.5-325 MG per tablet Take 1 tablet by mouth 2 (Two) Times a Day As Needed. (Patient not taking: Reported on 3/27/2024) 40 tablet 0 Not Taking    Insulin Glargine (Lantus SoloStar) 100 UNIT/ML injection pen Inject 20 Units under the skin into the appropriate area as directed Every Evening. 15 mL 3      Insulin Regular Human, Conc, (HumuLIN R U-500 KwikPen) 500 UNIT/ML solution pen-injector CONCENTRATED injection Inject 120 Units under the skin into the appropriate area as directed 2 (Two) Times a Day with breakfast and dinner. (Patient not taking: Reported on 3/27/2024) 18 mL 5 Not Taking    Insulin Regular Human, Conc, (HumuLIN R U-500 KwikPen) 500 UNIT/ML solution pen-injector CONCENTRATED injection Inject 40 Units under the skin into the appropriate area with regular meals AND 40 Units with large meals. 54 mL 3      isosorbide mononitrate (IMDUR) 30 MG 24 hr tablet Take 1 tablet by mouth Daily.          magnesium hydroxide (Milk of Magnesia) 400 MG/5ML suspension Take 30 mL every day by oral route for 30 days. 900 mL 0      magnesium hydroxide (Milk of Magnesia) 400 MG/5ML suspension Take 30mL by mouth once daily for 30 days. (Patient not taking: Reported on 3/27/2024) 900 mL 0 Not Taking    melatonin 3 MG tablet Take 1 tablet by mouth At Night As Needed for Sleep.          methylcellulose (Citrucel) oral powder Mix 5g as directed and drink twice daily for 90 days. 900 g 10      metoclopramide (REGLAN) 5 MG tablet Take 1 tablet by mouth 3 times a day.          midodrine (PROAMATINE) 2.5 MG tablet Take 1 tablet by mouth 3 (Three) Times a Day Before Meals.          nitroglycerin (NITROSTAT) 0.4 MG SL tablet Dissolve 1 tablet by mouth every 5 minutes as needed for chest pain; MAX 3 tabs/24 hours.  If no improvement after 3 tabs go to ER 25 tablet 0      ondansetron (ZOFRAN) 4 MG tablet  Take 1 tablet by mouth Every 6 (Six) Hours As Needed for Nausea or Vomiting.          oxyCODONE (ROXICODONE) 10 MG tablet Take 1 tablet by mouth twice a day as needed 60 tablet 0      pantoprazole (Protonix) 40 MG EC tablet Take 1 tablet by mouth 2 (Two) Times a Day. (Patient not taking: Reported on 3/27/2024) 180 tablet 4 Not Taking    polyethylene glycol (MIRALAX) 17 g packet Take 17 g by mouth Daily. Obtain OTC          prazosin (MINIPRESS) 1 MG capsule Take 1 capsule by mouth every night at bedtime. 30 capsule 2      pregabalin (LYRICA) 100 MG capsule Take 2 capsules by mouth Every Night.          pregabalin (LYRICA) 50 MG capsule Take 1 capsule by mouth Daily.          rosuvastatin (CRESTOR) 10 MG tablet Take 1 tablet by mouth Daily.          sennosides-docusate (PERICOLACE) 8.6-50 MG per tablet Take 1 tablet by mouth Every Night. Obtain OTC          sennosides-docusate (PERICOLACE) 8.6-50 MG per tablet Take 1 tablet by mouth every night at bedtime. (Patient not taking: Reported on 3/27/2024) 30 tablet 2 Not Taking    sodium hypochlorite (DAKIN'S 1/4 STRENGTH) 0.125 % solution topical solution 0.125% Apply to affected area twice daily (Patient not taking: Reported on 3/27/2024) 473 mL 3 Not Taking    sodium hypochlorite (DAKIN'S 1/4 STRENGTH) 0.125 % solution topical solution 0.125% Apply to affected area twice daily as directed (Patient not taking: Reported on 3/27/2024) 473 mL 5 Not Taking    sodium hypochlorite (DAKIN'S) 0.25 % topical solution Use to wound daily as instructed 473 mL 1      sucralfate (CARAFATE) 1 g tablet Take 1 tablet by mouth 3 times a day.          tamsulosin (FLOMAX) 0.4 MG capsule 24 hr capsule Take 1 capsule by mouth Daily. 90 capsule 1      timolol (TIMOPTIC) 0.5 % ophthalmic solution Administer 1 drop to both eyes 2 (Two) Times a Day.          valsartan (DIOVAN) 40 MG tablet Take 1 tablet by mouth Daily.          zinc sulfate (ZINCATE) 50 MG capsule Take 1 capsule by mouth Daily.     "              Hospital Medications (active)           Dose Frequency Start End     acetaminophen (TYLENOL) 160 MG/5ML oral solution 650 mg 650 mg Every 4 Hours PRN 3/26/2024 --     Admin Instructions: If given for fever, use fever parameter: fever greater than 100.4 °F  Based on patient request - if ordered for moderate or severe pain, provider allows for administration of a medication prescribed for a lower pain scale.     Do not exceed 4 grams of acetaminophen in a 24 hr period. Max dose of 2gm for AST/ALT greater than 120 units/L.     If given for pain, use the following pain scale:   Mild Pain = Pain Score of 1-3, CPOT 1-2  Moderate Pain = Pain Score of 4-6, CPOT 3-4  Severe Pain = Pain Score of 7-10, CPOT 5-8     Route: Oral     Linked Group 1: Placed in \"Or\" Linked Group             acetaminophen (TYLENOL) suppository 650 mg 650 mg Every 4 Hours PRN 3/26/2024 --     Admin Instructions: If given for fever, use fever parameter: fever greater than 100.4 °F  Based on patient request - if ordered for moderate or severe pain, provider allows for administration of a medication prescribed for a lower pain scale.     Do not exceed 4 grams of acetaminophen in a 24 hr period. Max dose of 2gm for AST/ALT greater than 120 units/L.     If given for pain, use the following pain scale:   Mild Pain = Pain Score of 1-3, CPOT 1-2  Moderate Pain = Pain Score of 4-6, CPOT 3-4  Severe Pain = Pain Score of 7-10, CPOT 5-8     Route: Rectal     Linked Group 1: Placed in \"Or\" Linked Group             acetaminophen (TYLENOL) tablet 650 mg 650 mg Every 4 Hours PRN 3/26/2024 --     Admin Instructions: If given for fever, use fever parameter: fever greater than 100.4 °F  Based on patient request - if ordered for moderate or severe pain, provider allows for administration of a medication prescribed for a lower pain scale.     Do not exceed 4 grams of acetaminophen in a 24 hr period. Max dose of 2gm for AST/ALT greater than 120 units/L.   " "  If given for pain, use the following pain scale:   Mild Pain = Pain Score of 1-3, CPOT 1-2  Moderate Pain = Pain Score of 4-6, CPOT 3-4  Severe Pain = Pain Score of 7-10, CPOT 5-8     Route: Oral     Linked Group 1: Placed in \"Or\" Linked Group             apixaban (ELIQUIS) tablet 5 mg ([Held by provider] since 4/5/2024  4:47 PM) 5 mg Every 12 Hours Scheduled 3/26/2024 --     Admin Instructions: Tablet may be crushed and suspended in 60 mL of water or D5W and immediately delivered via NG tube.     Route: Oral     ascorbic acid (VITAMIN C) tablet 1,000 mg 1,000 mg Daily 3/28/2024 --     Route: Oral     aspirin EC tablet 81 mg ([Held by provider] since 4/5/2024  4:47 PM) 81 mg Daily 3/28/2024 --     Admin Instructions: Do not crush or chew the capsules or tablets. The drug may not work as designed if the capsule or tablet is crushed or chewed. Swallow whole.  Do not exceed 4 grams of aspirin in a 24 hr period.     If given for pain, use the following pain scale:   Mild Pain = Pain Score of 1-3, CPOT 1-2  Moderate Pain = Pain Score of 4-6, CPOT 3-4  Severe Pain = Pain Score of 7-10, CPOT 5-8     Route: Oral     bisacodyl (DULCOLAX) EC tablet 5 mg 5 mg Daily PRN 4/4/2024 --     Admin Instructions: Use if no bowel movement after 12 hours.  Swallow whole. Do not crush, split, or chew tablet.     Route: Oral     Linked Group 2: Placed in \"And\" Linked Group             bisacodyl (DULCOLAX) suppository 10 mg 10 mg Daily PRN 4/4/2024 --     Admin Instructions: Use if no bowel movement after 12 hours.  Hold for diarrhea     Route: Rectal     Linked Group 2: Placed in \"And\" Linked Group             bumetanide (BUMEX) tablet 1 mg ([Held by provider] since 4/5/2024  9:35 AM) 1 mg Daily 4/3/2024 --     Admin Instructions: Hold for SBP less than 100, DBP less than 60.  Take with food if GI upset occurs.     Route: Oral     carvedilol (COREG) tablet 3.125 mg 3.125 mg 2 Times Daily With Meals 3/28/2024 --     Admin Instructions: " Hold for SBP less than 100, DBP less than 60, or heart rate less than 50. If a dose is held, please contact the provider.  Give with food.     Route: Oral     dextrose (D50W) (25 g/50 mL) IV injection 25 g 25 g Every 15 Minutes PRN 3/26/2024 --     Admin Instructions: Blood sugar less than 70; patient has IV access - Unresponsive, NPO or Unable To Safely Swallow     Route: Intravenous     dextrose (GLUTOSE) oral gel 15 g 15 g Every 15 Minutes PRN 3/26/2024 --     Admin Instructions: BS<70, Patient Alert, Is not NPO, Can safely swallow.     Route: Oral     Diclofenac Sodium (VOLTAREN) 1 % gel 2 g 2 g 4 Times Daily PRN 3/28/2024 --     Admin Instructions: Apply to shoulders and knees  Unknown do not cover area with occlusive dressing or apply any other med to affected area. Do not wash affected area for 1 hr after applying. Use dosing card for correct dose measurement.     Route: Topical     dicyclomine (BENTYL) capsule 20 mg 20 mg 3 Times Daily PRN 3/31/2024 --     Route: Oral     donepezil (ARICEPT) tablet 10 mg 10 mg Daily 3/27/2024 --     Route: Oral     glucagon (GLUCAGEN) injection 1 mg 1 mg Every 15 Minutes PRN 3/26/2024 --     Admin Instructions: Blood Glucose Less Than 70 - Patient Without IV Access - Unresponsive, NPO or Unable To Safely Swallow  Reconstitute powder for injection by adding 1 mL of -supplied sterile diluent or sterile water for injection to a vial containing 1 mg of the drug, to provide solutions containing 1 mg/mL. Shake vial gently to dissolve.     Route: Intramuscular     haloperidol lactate (HALDOL) injection 5 mg 5 mg Every 6 Hours PRN 3/27/2024 --     Admin Instructions: For IV Push, administer no faster than 5 mg / minute.     Route: Intravenous     insulin detemir (LEVEMIR) injection 30 Units 30 Units Nightly 3/26/2024 --     Admin Instructions: Do not hold basal insulin without an order. Consider requesting a dose edit, if needed.  Unknown     Route: Subcutaneous      Insulin Lispro (humaLOG) injection 4-24 Units 4-24 Units 4 Times Daily Before Meals & Nightly 3/26/2024 --     Admin Instructions: Correction Insulin - High Dose (Total Insulin Dose Greater Than 80 units/day, Insulin Resistant Patient, Patient With Infection, Steroid Treatment)     Blood Glucose 150-199 mg/dL - 4 units  Blood Glucose 200-249 mg/dL - 8 units  Blood Glucose 250-299 mg/dL - 12 units  Blood Glucose 300-349 mg/dL - 16 units  Blood Glucose 350-400 mg/dL - 20 units  Blood Glucose Greater Than 400 mg/dL - 24 units & Call Provider  Unknown caution: Look alike/sound alike drug alert unknown     Route: Subcutaneous     insulin regular (humuLIN R,novoLIN R) injection 10 Units 10 Units 3 Times Daily Before Meals 3/26/2024 --     Admin Instructions: Unknown caution: Look alike/sound alike drug alert unknown     Route: Subcutaneous     isosorbide mononitrate (IMDUR) 24 hr tablet 30 mg 30 mg Every 24 Hours Scheduled 3/28/2024 --     Admin Instructions: Do not crush or chew the capsules or tablets. The drug may not work as designed if the capsule or tablet is crushed or chewed. Swallow whole.     Route: Oral     lactulose solution 20 g 20 g 3 Times Daily 4/3/2024 --     Route: Oral     linezolid (ZYVOX) tablet 600 mg 600 mg Every 12 Hours Scheduled 4/5/2024 4/14/2024     Route: Oral     magnesium hydroxide (MILK OF MAGNESIA) suspension 10 mL 10 mL Daily PRN 3/28/2024 --     Route: Oral     melatonin tablet 6 mg 6 mg Nightly 3/27/2024 --     Route: Oral     midodrine (PROAMATINE) tablet 2.5 mg 2.5 mg 3 Times Daily Before Meals 3/28/2024 --     Route: Oral     Morphine sulfate (PF) injection 2 mg 2 mg Every 3 Hours PRN 3/31/2024 4/7/2024     Admin Instructions: Based on patient request - if ordered for moderate or severe pain, provider allows for administration of a medication prescribed for a lower pain scale.  Unknown     Caution: Look alike/sound alike drug alert     If given for pain, use the following pain  "scale:  Mild Pain = Pain Score of 1-3, CPOT 1-2  Moderate Pain = Pain Score of 4-6, CPOT 3-4  Severe Pain = Pain Score of 7-10, CPOT 5-8     Route: Intravenous     mupirocin (BACTROBAN) 2 % nasal ointment 1 Application 1 Application 2 Times Daily 3/28/2024 --     Admin Instructions: Apply ½ tube (smaller amount may be sufficient for pediatric/ patients) in each nare. Press sides of nose together and gently massage for 1 minute to spread the ointment throughout the inside of the nostrils  Unknown     Route: Each Nare     nitroglycerin (NITROSTAT) SL tablet 0.4 mg 0.4 mg Every 5 Minutes PRN 3/26/2024 --     Admin Instructions: If Pain Unrelieved After 3 Doses Notify MD  May administer up to 3 doses per episode.     Route: Sublingual     ondansetron (ZOFRAN) injection 4 mg 4 mg Every 6 Hours PRN 3/26/2024 --     Admin Instructions: If BOTH ondansetron (ZOFRAN) and promethazine (PHENERGAN) are ordered use ondansetron first and THEN promethazine IF ondansetron is ineffective.     Route: Intravenous     Linked Group 3: Placed in \"Or\" Linked Group             ondansetron ODT (ZOFRAN-ODT) disintegrating tablet 4 mg 4 mg Every 6 Hours PRN 3/26/2024 --     Admin Instructions: If BOTH ondansetron (ZOFRAN) and promethazine (PHENERGAN) are ordered use ondansetron first and THEN promethazine IF ondansetron is ineffective.  Place on tongue and allow to dissolve.     Route: Oral     Linked Group 3: Placed in \"Or\" Linked Group             oxyCODONE (ROXICODONE) immediate release tablet 10 mg 10 mg 2 Times Daily PRN 3/26/2024 --     Admin Instructions: Based on patient request - if ordered for moderate or severe pain, provider allows for administration of a medication prescribed for a lower pain scale.  Unknown     If given for pain, use the following pain scale:  Mild Pain = Pain Score of 1-3, CPOT 1-2  Moderate Pain = Pain Score of 4-6, CPOT 3-4  Severe Pain = Pain Score of 7-10, CPOT 5-8     Route: Oral     pantoprazole " "(PROTONIX) injection 40 mg 40 mg Every 12 Hours Scheduled 4/5/2024 --     Admin Instructions: Dilute with 10 mL of 0.9% NaCl and give IV push over 2 minutes.     Route: Intravenous     polyethylene glycol (MIRALAX) packet 17 g 17 g Daily 4/4/2024 --     Admin Instructions: Use if no bowel movement after 12 hours. Mix in 6-8 ounces of water.  Use 4-8 ounces of water, tea, or juice for each 17 gram dose.     Route: Oral     Linked Group 2: Placed in \"And\" Linked Group             pregabalin (LYRICA) capsule 100 mg 100 mg Nightly 3/28/2024 --     Admin Instructions: Unknown     Route: Oral     pregabalin (LYRICA) capsule 50 mg 50 mg Daily 3/28/2024 --     Admin Instructions: Unknown     Route: Oral     rosuvastatin (CRESTOR) tablet 10 mg 10 mg Nightly 3/28/2024 --     Admin Instructions: Avoid grapefruit juice.     Route: Oral     sennosides-docusate (PERICOLACE) 8.6-50 MG per tablet 1 tablet 1 tablet 2 Times Daily 4/4/2024 --     Admin Instructions: HOLD MEDICATION IF PATIENT HAS HAD BOWEL MOVEMENT. Start bowel management regimen if patient has not had a bowel movement after 12 hours.     Route: Oral     Linked Group 2: Placed in \"And\" Linked Group             sodium chloride 0.9 % flush 10 mL 10 mL As Needed 3/26/2024 --     Route: Intravenous     sodium chloride 0.9 % flush 10 mL 10 mL Every 12 Hours Scheduled 3/26/2024 --     Route: Intravenous     sodium chloride 0.9 % flush 10 mL 10 mL As Needed 3/26/2024 --     Route: Intravenous     sodium chloride 0.9 % infusion 40 mL 40 mL As Needed 3/26/2024 --     Admin Instructions: Following administration of an IV intermittent medication, flush line with 40mL NS at 100mL/hr.     Route: Intravenous     sucralfate (CARAFATE) 1 GM/10ML suspension 1 g 1 g 3 Times Daily Before Meals 3/28/2024 --     Route: Oral     tamsulosin (FLOMAX) 24 hr capsule 0.4 mg 0.4 mg Daily 3/28/2024 --     Admin Instructions: Do not crush or chew the capsules or tablets. The drug may not work as " designed if the capsule or tablet is crushed or chewed. Swallow whole.     Route: Oral     timolol (TIMOPTIC) 0.25 % ophthalmic solution 1 drop 1 drop Every 12 Hours Scheduled 3/28/2024 --     Route: Both Eyes     zinc sulfate (ZINCATE) capsule 220 mg 220 mg Daily 3/28/2024 --     Route: Oral                Social History            Tobacco Use    Smoking status: Former       Current packs/day: 0.00       Types: Cigarettes       Quit date:        Years since quittin.2    Smokeless tobacco: Never    Tobacco comments:       smoked in [place]   Substance Use Topics    Alcohol use: No         Past Family History:        Family History   Problem Relation Age of Onset    Colon cancer Father      Heart disease Father      Colon cancer Sister      Colon polyps Sister      Alzheimer's disease Mother      Coronary artery disease Sister      Coronary artery disease Sister           Review of Systems:  Constitutional: No unexpected weight change, no fatigue, no unexplained fever, no sweats or chills.           HEENT: No icteric sclera.  No hearing or visual deficits.  No sore throat.  No chronic nasal discharge.  Pulmonary: No chronic cough.  No hemoptysis.  No shortness of breath.  Cardiovascular: No chest pain.  No palpitations.  No shortness of breath.  Gastrointestinal: As above.  Musculoskeletal/extremities: No peripheral edema.  No cyanosis.  No claudications.  No back pain.  Genitourinary: No dysuria.  No blood in stool.  No urethral discharges.  Neurologic: No seizures.  No headaches.  No dizziness.  No gait problems.  Skin: No rash.  No icterus.  Mental: No psychosis.  No confusions.  No hallucinations.        Physical Exam:  Temp:  [96.9 °F (36.1 °C)-97.8 °F (36.6 °C)] 96.9 °F (36.1 °C)  Heart Rate:  [61-82] 71  Resp:  [16-18] 18  BP: (130-146)/(66-82) 136/66  Body mass index is 39.22 kg/m².  No intake or output data in the 24 hours ending 24 0941  No intake/output data recorded.     General  appearance: Overweight  HEENT: Nonicteric sclerae.  Moist oral mucosa.  PERRLA.  EOMI.  Clear pharynx.  Lungs: Clear to auscultation bilaterally.  No wheezing, rales or rhonchi.  Heart: Regular rate and rhythm.  Normal S1 and S2, no S3, S4 or murmur.  Abdomen: Soft, nondistended, nontender to palpation, with normoactive bowel sounds, no hepatosplenomegaly, no palpable masses.  Extremities: No cyanosis, edema or pulse deficits.  Lower extremity ulceration  Skin: No rash or jaundice.     Results Review:  Lab Results (last 24 hours)         Procedure Component Value Units Date/Time     POC Glucose Once [[id number]]  (Normal) Collected: 04/06/24 0730     Specimen: Blood Updated: 04/06/24 0743       Glucose 100 mg/dL         Comment: : [id number] Aaron Deal ID: [id number]        Blood Culture With DARSHAN - Blood, Hand, Right [[id number]]  (Normal) Collected: 04/01/24 0402     Specimen: Blood from Hand, Right Updated: 04/06/24 0445       Blood Culture No growth at 5 days     Basic Metabolic Panel [[id number]]  (Abnormal) Collected: 04/06/24 0340     Specimen: Blood Updated: 04/06/24 0432       Glucose 86 mg/dL         BUN 33 mg/dL         Creatinine 2.10 mg/dL         Sodium 141 mmol/L         Potassium 4.5 mmol/L         Comment: Specimen hemolyzed.  Result may be falsely elevated.          Chloride 104 mmol/L         CO2 28.0 mmol/L         Calcium 10.7 mg/dL         BUN/Creatinine Ratio 15.7       Anion Gap 9.0 mmol/L         eGFR 33.9 mL/min/1.73       Narrative:       GFR Normal >60  Chronic Kidney Disease <60  Kidney Failure <15        PTH, Intact [[id number]]  (Abnormal) Collected: 04/06/24 0340     Specimen: Blood Updated: 04/06/24 0427       PTH, Intact 7.3 pg/mL       Narrative:       Results may be falsely decreased if patient taking Biotin.        Protime-INR [[id number]]  (Abnormal) Collected: 04/06/24 0340     Specimen: Blood Updated: 04/06/24 0423       Protime 15.0 Seconds         INR 1.13     aPTT  [[id number]]  (Abnormal) Collected: 04/06/24 0340     Specimen: Blood Updated: 04/06/24 0423       PTT 39.2 seconds       CBC & Differential [[id number]]  (Abnormal) Collected: 04/06/24 0340     Specimen: Blood Updated: 04/06/24 0411     Narrative:       The following orders were created for panel order CBC & Differential.  Procedure                               Abnormality         Status                     ---------                               -----------         ------                     CBC Auto Differential[[id number]]        Abnormal            Final result                  Please view results for these tests on the individual orders.     CBC Auto Differential [[id number]]  (Abnormal) Collected: 04/06/24 0340     Specimen: Blood Updated: 04/06/24 0411       WBC 5.43 10*3/mm3         RBC 3.37 10*6/mm3         Hemoglobin 9.5 g/dL         Hematocrit 30.2 %         MCV 89.6 fL         MCH 28.2 pg         MCHC 31.5 g/dL         RDW 14.1 %         RDW-SD 45.8 fl         MPV 10.5 fL         Platelets 294 10*3/mm3         Neutrophil % 53.8 %         Lymphocyte % 31.1 %         Monocyte % 9.8 %         Eosinophil % 4.2 %         Basophil % 0.7 %         Immature Grans % 0.4 %         Neutrophils, Absolute 2.92 10*3/mm3         Lymphocytes, Absolute 1.69 10*3/mm3         Monocytes, Absolute 0.53 10*3/mm3         Eosinophils, Absolute 0.23 10*3/mm3         Basophils, Absolute 0.04 10*3/mm3         Immature Grans, Absolute 0.02 10*3/mm3         nRBC 0.0 /100 WBC       Vitamin D,25-Hydroxy [[id number]] Collected: 04/06/24 0340     Specimen: Blood Updated: 04/06/24 0405     POC Glucose Once [[id number]]  (Abnormal) Collected: 04/05/24 2042     Specimen: Blood Updated: 04/05/24 2053       Glucose 149 mg/dL         Comment: : [id number] Eli Simpson ID: [id number]        POC Glucose Once [[id number]]  (Abnormal) Collected: 04/05/24 1718     Specimen: Blood Updated: 04/05/24 1729       Glucose 160 mg/dL         Comment:  : [id number] Severiano RuthahMeter ID: [id number]        POC Glucose Once [[id number]]  (Abnormal) Collected: 04/05/24 1142     Specimen: Blood Updated: 04/05/24 1152       Glucose 239 mg/dL         Comment: : [id number] العلي SarahMeter ID: [id number]        Magnesium [[id number]]  (Normal) Collected: 04/05/24 0512     Specimen: Blood Updated: 04/05/24 0954       Magnesium 2.3 mg/dL       Comprehensive Metabolic Panel [[id number]]  (Abnormal) Collected: 04/05/24 0512     Specimen: Blood Updated: 04/05/24 0954       Glucose 173 mg/dL         BUN 31 mg/dL         Creatinine 1.98 mg/dL         Sodium 139 mmol/L         Potassium 4.4 mmol/L         Chloride 102 mmol/L         CO2 25.0 mmol/L         Calcium 11.1 mg/dL         Total Protein 7.2 g/dL         Albumin 3.8 g/dL         ALT (SGPT) 18 U/L         AST (SGOT) 22 U/L         Alkaline Phosphatase 138 U/L         Total Bilirubin 0.4 mg/dL         Globulin 3.4 gm/dL         A/G Ratio 1.1 g/dL         BUN/Creatinine Ratio 15.7       Anion Gap 12.0 mmol/L         eGFR 36.3 mL/min/1.73       Narrative:       GFR Normal >60  Chronic Kidney Disease <60  Kidney Failure <15                   Radiology Review:  Imaging Results (Last 72 Hours)         Procedure Component Value Units Date/Time     XR Abdomen KUB [[id number]] Collected: 04/03/24 1345       Updated: 04/03/24 1349     Narrative:       EXAMINATION: XR ABDOMEN KUB- 4/3/2024 1:45 PM     HISTORY: abdominal distention; E11.628-Type 2 diabetes mellitus with  other skin complications; L08.9-Local infection of the skin and  subcutaneous tissue, unspecified; E11.65-Type 2 diabetes mellitus with  hyperglycemia; Z74.09-Other reduced mobility.     REPORT: Supine imaging of the abdomen was performed.     COMPARISON: KUB 10/7/2023.     A large amount of stool seen within the transverse colon and flexures,  likely representing constipation. No evidence of bowel obstruction is  identified. There are no definite urinary tract  calculi. Cholecystectomy  clips are present. No acute osseous abnormality is identified.        Impression:       Extensive constipation within the transverse colon and  flexures.     This report was signed and finalized on 4/3/2024 1:46 PM by Dr. Percy Cesar MD.                   Impression/Plan:    Sepsis    Essential hypertension    Type 2 diabetes mellitus with hyperglycemia, with long-term current use of insulin    Anemia due to chronic kidney disease    Diabetic polyneuropathy associated with type 2 diabetes mellitus    BPH without obstruction/lower urinary tract symptoms    Chronic diastolic heart failure    Chronic kidney disease (CKD), stage IV (severe)    Diabetic foot infection        1 episode of red rectal bleeding, possibly perianal source such as hemorrhoids.  Cannot rule out a more proximal colonic source.  The patient appears to have stopped bleeding presently and is not having any further rectal bleeding.  He is tolerating a regular diet and his aspirin and Eliquis are on hold.  Agree with serial CBCs and blood transfusions as needed.  If the patient should bleed further and his hemoglobin dropped further, would recommend colonoscopy plus or minus upper endoscopy.  The patient should have a colonoscopy at some point which can be done electively if the patient does not have any further rectal bleeding.  He should follow-up with a gastroenterologist within the week after hospital release to consider outpatient upper endoscopy and colonoscopy.  The above was discussed in detail with the patient and the patient's nursing staff and they appeared to understand and agreed.  Avoid aspirin and nonsteroidal anti-inflammatory drugs if possible.  Further recommendations based on the results of the above studies and the patient's clinical course.  Thank you     Raymond Mederos MD  04/06/24   09:41 CDT     DISCLAIMER:    This physician works through a [employer] as an inpatient consultant  "gastroenterologist only and has no outpatient clinic for patient follow up.  Any results not available at time of inpatient discharge and/or GI clinic follow up should be managed by the hospitalist team, PCP, or outpatient gastroenterologist.                      Julia Adrian MD   Physician  Infectious Disease     Progress Notes     Addendum     Date of Service: 04/06/24 0915  Creation Time: 04/06/24 0915     Expand All Collapse All    INFECTIOUS DISEASES PROGRESS NOTE     Patient:  Erick Luong  YOB: 1955  MRN: [id number]       Admit date: 3/26/2024             Admitting Physician: Latanya Paez MD  Primary Care Physician: Del Shetty MD     Chief Complaint: Stomach \"sore\"     Interval History: Patient was up to bathroom.  He did have offload boot on.  Small stool noted with some bright red blood in the toilet.  Apparently this started yesterday.  GI consulted.        Allergies:   Allergies         Allergies   Allergen Reactions    Cefepime Hives and Anaphylaxis    Bactrim [Sulfamethoxazole-Trimethoprim] Other (See Comments)       \"RENAL FAILURE\"    Vancomycin Itching    Zolpidem Unknown - High Severity       \"makes him crazy\"    Zolpidem Tartrate Unknown - Low Severity and Provider Review Needed    Metronidazole Rash            Current Scheduled Medications:     Scheduled Medication   [Held by provider] apixaban, 5 mg, Oral, Q12H  ascorbic acid, 1,000 mg, Oral, Daily  [Held by provider] aspirin, 81 mg, Oral, Daily  [Held by provider] bumetanide, 1 mg, Oral, Daily  carvedilol, 3.125 mg, Oral, BID With Meals  donepezil, 10 mg, Oral, Daily  insulin detemir, 30 Units, Subcutaneous, Nightly  insulin lispro, 4-24 Units, Subcutaneous, 4x Daily AC & at Bedtime  insulin regular, 10 Units, Subcutaneous, TID AC  isosorbide mononitrate, 30 mg, Oral, Q24H  lactulose, 20 g, Oral, TID  linezolid, 600 mg, Oral, Q12H  melatonin, 6 mg, Oral, Nightly  midodrine, 2.5 mg, Oral, TID AC  mupirocin, 1 " "Application, Each Nare, BID  pantoprazole, 40 mg, Intravenous, Q12H  senna-docusate sodium, 1 tablet, Oral, BID   And  polyethylene glycol, 17 g, Oral, Daily  pregabalin, 100 mg, Oral, Nightly  pregabalin, 50 mg, Oral, Daily  rosuvastatin, 10 mg, Oral, Nightly  sodium chloride, 10 mL, Intravenous, Q12H  sucralfate, 1 g, Oral, TID AC  tamsulosin, 0.4 mg, Oral, Daily  timolol, 1 drop, Both Eyes, Q12H  zinc sulfate, 220 mg, Oral, Daily         Current PRN Medications:    PRN Medication     acetaminophen **OR** acetaminophen **OR** acetaminophen    senna-docusate sodium **AND** polyethylene glycol **AND** bisacodyl **AND** bisacodyl    dextrose    dextrose    Diclofenac Sodium    dicyclomine    glucagon (human recombinant)    haloperidol lactate    magnesium hydroxide    Morphine    nitroglycerin    ondansetron ODT **OR** ondansetron    oxyCODONE    sodium chloride    sodium chloride    sodium chloride        Infusion Medications                   Objective[]Expand by Default  Vital Signs:  Temp (24hrs), Av.5 °F (36.4 °C), Min:96.9 °F (36.1 °C), Max:97.8 °F (36.6 °C)        /66 (BP Location: Right arm, Patient Position: Sitting)   Pulse 71   Temp 96.9 °F (36.1 °C) (Axillary)   Resp 18   Ht 182.9 cm (72\")   Wt 131 kg (289 lb 3.2 oz)   SpO2 100%   BMI 39.22 kg/m²            Physical Exam:     General: Patient was standing up in the bathroom and he was about to ambulate back to his chair.  Lower extremity dressing in place.  Patient had offloading boot in place.  Noted bowel movement with bright red blood in toilet.     Photos below from                              esults Review:    I reviewed the patient's new clinical results.     Lab Results:     CBC:             Lab Results   Lab 24  0526 24  0402 24  0617 24  0334 24  0514 24  0512 24  0340   WBC 5.01 5.15 7.59 6.54 5.64 6.93 5.43   HEMOGLOBIN 10.3* 9.9* 10.8* 9.0* 10.0* 10.9* 9.5*   HEMATOCRIT 31.6* " 30.6* 34.0* 28.9* 31.3* 34.5* 30.2*   PLATELETS 149 162 197 197 251 321 294         AutoDiff:         Lab Results   Lab 04/04/24  0514 04/05/24 0512 04/06/24  0340   NEUTROPHIL % 56.7 57.9 53.8   LYMPHOCYTE % 27.8 27.8 31.1   MONOCYTES % 10.1 8.5 9.8   EOSINOPHIL % 4.4 4.9 4.2   BASOPHIL % 0.5 0.6 0.7   NEUTROS ABS 3.19 4.01 2.92   LYMPHS ABS 1.57 1.93 1.69   MONOS ABS 0.57 0.59 0.53   EOS ABS 0.25 0.34 0.23   BASOS ABS 0.03 0.04 0.04         Manual Diff:          Lab Results   Lab 04/04/24  0514 04/05/24 0512 04/06/24  0340   NEUTROS ABS 3.19 4.01 2.92               CMP:           Lab Results   Lab 03/31/24  0526 04/01/24 0402 04/04/24 0514 04/05/24 0512 04/06/24  0340   SODIUM 136   < > 139 139  139 141   POTASSIUM 3.6   < > 4.6 4.4  4.4 4.5   CHLORIDE 101   < > 102 102  102 104   CO2 23.0   < > 26.0 25.0  25.0 28.0   BUN 51*   < > 32* 31*  31* 33*   CREATININE 2.52*   < > 1.78* 1.98*  1.98* 2.10*   CALCIUM 8.6   < > 10.5 11.1*  11.1* 10.7*   BILIRUBIN 0.5  --   --  0.4  --    ALK PHOS 110  --   --  138*  --    ALT (SGPT) 19  --   --  18  --    AST (SGOT) 30  --   --  22  --    GLUCOSE 201*   < > 185* 173*  173* 86    < > = values in this interval not displayed.         Estimated Creatinine Clearance: 47.8 mL/min (A) (by C-G formula based on SCr of 2.1 mg/dL (H)).     Culture Results:     Microbiology Results (last 10 days)         Procedure Component Value - Date/Time     Blood Culture With DARSHAN - Blood, Hand, Right [[id number]]  (Normal) Collected: 04/01/24 0402     Lab Status: Final result Specimen: Blood from Hand, Right Updated: 04/06/24 0445       Blood Culture No growth at 5 days     Blood Culture With DARSHAN - Blood, Arm, Left [[id number]]  (Normal) Collected: 03/30/24 0620     Lab Status: Final result Specimen: Blood from Arm, Left Updated: 04/04/24 0645       Blood Culture No growth at 5 days     Blood Culture With DARSHAN - Blood, Hand, Right [[id number]]  (Abnormal)  (Susceptibility) Collected:  03/29/24 0843     Lab Status: Final result Specimen: Blood from Hand, Right Updated: 04/02/24 0521       Blood Culture Staphylococcus aureus, MRSA       Comment:    Methicillin resistant Staphylococcus aureus, Patient may be an isolation risk.  Infectious disease consultation is highly recommended to rule out distant foci of infection.          Isolated from Aerobic Bottle       Gram Stain Aerobic Bottle Gram positive cocci     Susceptibility                   Staphylococcus aureus, MRSA         MATT         Gentamicin Susceptible         Oxacillin Resistant         Rifampin Susceptible         Vancomycin Susceptible                                Susceptibility Comments         Staphylococcus aureus, MRSA     This isolate is presumed to be clindamycin resistant based on detection of inducible clindamycin resistance.  Clindamycin may still be effective in some patients.                  Blood Culture ID, PCR - Blood, Hand, Right [[id number]]  (Abnormal) Collected: 03/29/24 0843     Lab Status: Final result Specimen: Blood from Hand, Right Updated: 03/30/24 2028       BCID, PCR Staph aureus. mecA/C and MREJ (methicillin resistance gene) detected. Identification by BCID2 PCR.       BOTTLE TYPE Aerobic Bottle     Narrative:       Infectious disease consultation is highly recommended to rule out distant foci of infection.     MRSA Screen, PCR (Inpatient) - Swab, Nares [[id number]]  (Abnormal) Collected: 03/28/24 2128     Lab Status: Final result Specimen: Swab from Nares Updated: 03/28/24 2239       MRSA PCR MRSA Detected     Narrative:       The negative predictive value of this diagnostic test is high and should only be used to consider de-escalating anti-MRSA therapy. A positive result may indicate colonization with MRSA and must be correlated clinically.                Interpretation Summary          The study is technically difficult for diagnosis.  If there is concern for possible infective endocarditis, recommend  transesophageal echocardiogram to better visualize the valves.    Left ventricular systolic function is normal. Left ventricular ejection fraction appears to be 61 - 65%.    Left ventricular wall thickness is consistent with mild concentric hypertrophy    Left ventricular diastolic function is consistent with (grade III w/high LAP) fixed restrictive pattern.    Mildly reduced right ventricular systolic function noted.    The left atrial cavity is mildly dilated.    There is mild calcification of the aortic valve. No aortic valve regurgitation is present. No hemodynamically significant aortic valve stenosis is present.        Signed     Electronically signed by Omkar Charles DO on 4/2/24 at 1412 CDT         Radiology:            Abdominal films above.  Patient with large amount of stool noted.     Imaging Results (Last 72 Hours)         Procedure Component Value Units Date/Time     XR Abdomen KUB [[id number]] Collected: 04/03/24 1345       Updated: 04/03/24 1349     Narrative:       EXAMINATION: XR ABDOMEN KUB- 4/3/2024 1:45 PM     HISTORY: abdominal distention; E11.628-Type 2 diabetes mellitus with  other skin complications; L08.9-Local infection of the skin and  subcutaneous tissue, unspecified; E11.65-Type 2 diabetes mellitus with  hyperglycemia; Z74.09-Other reduced mobility.     REPORT: Supine imaging of the abdomen was performed.     COMPARISON: KUB 10/7/2023.     A large amount of stool seen within the transverse colon and flexures,  likely representing constipation. No evidence of bowel obstruction is  identified. There are no definite urinary tract calculi. Cholecystectomy  clips are present. No acute osseous abnormality is identified.        Impression:       Extensive constipation within the transverse colon and  flexures.     This report was signed and finalized on 4/3/2024 1:46 PM by Dr. Percy Cesar MD.                              Active Hospital Problems     Diagnosis      **Sepsis       Diabetic foot infection      Chronic kidney disease (CKD), stage IV (severe)      Chronic diastolic heart failure      BPH without obstruction/lower urinary tract symptoms      Diabetic polyneuropathy associated with type 2 diabetes mellitus      Anemia due to chronic kidney disease      Essential hypertension      Type 2 diabetes mellitus with hyperglycemia, with long-term current use of insulin           IMPRESSION:     MRSA bacteremia-blood cultures + March 26 and March 29.  Secondary to infected left foot with wounds and associated cellulitis on admission.  Blood cultures negative as of March 30.  Today is day #8/14 of antibiotic therapy since first negative blood culture  Chronic kidney disease stage IIIb-creatinine has been somewhat variable and improved off Bumex however Bumex was restarted.  Difficult situation  Type 2 diabetes mellitus-poorly controlled with hemoglobin A1c of 10.7 this admission  Bright red blood per rectum-has been seen by GI.  Plan for colonoscopy  MRSA nasal screen positive        RECOMMENDATION:   Continue linezolid-changed to p.o.  Wound care per Dr. Cerrato's orders  Glucose management per attending        Discussed with Dr Paez     >>> will see again Tuesday, call in interim if needed     Julia Adrian MD  04/06/24  09:15 CDT                Raymond Mederos MD   Physician  Gastroenterology     Op Note     Signed     Date of Service: 04/07/24 1233  Creation Time: 04/07/24 1323  Case Time: Procedures: Surgeons:   04/07/24 1233 COLONOSCOPY WITH ANESTHESIA    Raymond Mederos MD               Signed         COLONOSCOPY with hemoclipping of a bleeding lesion and submucosal injection therapy of a bleeding lesion (incomplete secondary to poor colon preparation     Date:  4/7/2024     Indications: Rectal bleeding       Procedure: Colonoscopy with hemoclipping of a bleeding lesion and injection therapy of a bleeding lesion     Sedation: As per anesthesia.     Surgeon: Cristopher Mederos MD           Anesthesia: Monitored Anesthesia Care     Procedure  Description: After informed consent was obtained, a timeout was called in the endoscopy suite to confirm the correct patient and appropriate procedure.  Thereafter with the patient in the left lateral position, adult Olympus colonoscope was inserted into the rectum.  Thereafter under direct vision scope was slowly advanced into the area of the transverse colon by palpation.  The procedure was aborted at this point secondary to a large amount of solid thick liquid stool which precluded further advancement of the colonoscope.  Careful examination of the colon was performed while slowly withdrawing the scope.  Retroflex examination of the rectum was also performed.     Findings: After informed consent and medications per the anesthesia service, the digital rectal examination was unremarkable.  The Olympus variable stiffness pediatric videocolonoscope was advanced into the anal canal and through to approximately the transverse colon by palpation.  We could not advance the colonoscope further secondary to solid and thick liquid stool precluding full visualization of the colonic mucosa.  No blood was seen and brown stool was noted.  There was however, 2 areas at 10 cm and 20 cm with friability ulceration and what appeared to be visible vessels noted in these locations.  We placed 2 clips on what appeared to be visible vessels at 20 cm and 4 clips and what appeared to be an area of visible vessels and ulceration at 10 cm and these areas were injected with a total of 5 cc of 1: 10,000 epinephrine with hemostasis achieved.  These areas did bleed easily when initially hemoclipped however hemostasis was achieved at the termination of the procedure.  Photodocumentation was obtained throughout.  Rectal turnaround otherwise appeared normal.  Withdrawal time from the transverse colon was approximately 7 minutes.  The preparation was poor throughout with a scattered  amount of thick liquid stool precluding full visualization of the mucosa however the mucosa that was visualized appeared normal except for these focal areas of mild erosions and ulcerations in visible vessels.     Complications: No immediate complications.     Recommendations: May have clear liquids today and advance diet further tomorrow if the patient has not had any further clinical bleeding and hemoglobin is stable.  If the patient should bleed further would recommend transfer to an institution that can provide interventional radiology and mesenteric arteriographic services.  Avoid aspirin and nonsteroidal anti-inflammatory drugs if possible.  The patient will eventually need a full colonoscopy, however, I believe that the patient is red rectal bleeding was coming from the areas of ulceration and visible vessels in the rectosigmoid colon.  The patient should follow-up with a gastroenterologist within the week after hospital release to have a complete colonoscopy with a more extensive colonic preparation.  The patient will be seen by another gastroenterologist tomorrow who will take over the service.  Agree with serial CBCs and blood transfusions as needed per the primary service.  Avoid constipation.  Further recommendations will be made based on the results of the above studies and the patient's clinical course.  Thank you     Procedure CPT code: Unknown     Post-Op Diagnosis Codes:  Unknown     Raymond Mederos MD     DISCLAIMER:    This physician works through a [employer] as an inpatient consultant gastroenterologist only and has no outpatient clinic for patient follow up.  Any results not available at time of inpatient discharge and/or GI clinic follow up should be managed by the hospitalist team, PCP, or outpatient gastroenterologist.                       Willy Arteaga MD   Physician  Nephrology     Progress Notes     Addendum     Date of Service: 04/07/24 1635  Creation Time:  04/07/24 1854     Expand All Collapse All    Nephrology ([facility]) Progress Note        Patient:  Erick Luong  YOB: 1955  Date of Service: 4/7/2024  MRN: [id number]        Acct: [id number]      Primary Care Physician: Del Shetty MD  Advance Directive:       Code Status and Medical Interventions:   Ordered at: 03/26/24 1815     Level Of Support Discussed With:     Health Care Surrogate     Code Status (Patient has no pulse and is not breathing):     CPR (Attempt to Resuscitate)     Medical Interventions (Patient has pulse or is breathing):     Full Support      Admit Date: 3/26/2024                        Hospital Day: 12  Referring Provider: No ref. provider found        Patient personally seen and examined.  Complete chart including Consults, Notes, Operative Reports, Labs, Cardiology, and Radiology studies reviewed as able.           Subjective:  Erick Luong is a 68 y.o. male for whom we were consulted for evaluation and treatment of cute kidney injury.  He has stage IIIb chronic kidney disease baseline and follows with Dr Lomas in our office.  Baseline creatinine approximately 1.8. He has a history of insulin-dependent type 2 diabetes, hypertension, abdominal obesity, obstructive sleep apnea, diabetic foot ulcer and coronary artery disease.   Patient presented to ER on 3/26 with altered mental status.  He had debridement of ongoing wound on left foot the day prior. Left foot was found to be warm and erythremic on arrival to ER.  He was noted to have elevated blood glucose over 400. Initial creatinine was 1.9.  He was admitted to medical floor for sepsis due to left foot wound. Patient had been agitated and confused during the admission, requiring wrist restraints due to pulling at lines and tubes.      Today, patient had some rectal bleeding and so was scheduled for colonoscopy tomorrow.  He was up in bed with family visiting.  Still with some increased  peripheral edema.  He denied other complaints upon questioning aside from some off-and-on abdominal pain earlier.     Allergies:  Cefepime, Bactrim [sulfamethoxazole-trimethoprim], Vancomycin, Zolpidem, Zolpidem tartrate, and Metronidazole     Home Meds:  Prescriptions Prior to Admission           Past Medical History:  Medical History        Past Medical History:   Diagnosis Date    Arthritis      Autonomic disease      CAD (coronary artery disease) 02/06/2017    Cervical radiculopathy 09/16/2021    Chronic constipation with acute exaccerbation 05/10/2021    Coronary artery disease      Degeneration of cervical intervertebral disc 08/11/2021    Diabetes mellitus      Diabetic foot ulcer 08/31/2020    Diabetic polyneuropathy associated with type 2 diabetes mellitus 01/18/2021    Elevated cholesterol      Gastroesophageal reflux disease 05/13/2019    Headache      HTN (hypertension), benign 05/03/2017    Hyperlipidemia      Hypertension      Mixed hyperlipidemia 02/07/2017    Multiple lung nodules 01/26/2020     5mm, 9 mm RLL identified 1/2020, not present 10/2019.    Myocardial infarction      Osteomyelitis 01/22/2020    Osteomyelitis of fifth toe of right foot 10/07/2019    Pancreatitis      Persistent insomnia 01/20/2020    Renal disorder      Sleep apnea 02/06/2017    Sleep apnea with use of continuous positive airway pressure (CPAP)       NON-COMPLIANT    Slow transit constipation 01/16/2019    Spinal stenosis in cervical region 09/16/2021    Vitamin D deficiency 03/02/2021         Problem Relation Age of Onset    Colon cancer Father      Heart disease Father      Colon cancer Sister      Colon polyps Sister      Alzheimer's disease Mother      Coronary artery disease Sister      Coronary artery disease Sister           Social History  Social History   Social History            Socioeconomic History    Marital status:    Tobacco Use    Smoking status: Former       Current packs/day: 0.00       Types:  Cigarettes       Quit date:        Years since quittin.2    Smokeless tobacco: Never    Tobacco comments:       smoked in highschool   Vaping Use    Vaping status: Never Used   Substance and Sexual Activity    Alcohol use: No    Drug use: No    Sexual activity: Defer            Review of Systems:  History obtained from chart review and the patient  General ROS: No fever or chills  Respiratory ROS: No cough, shortness of breath, wheezing  Cardiovascular ROS: No chest pain or palpitations  Gastrointestinal ROS: No nausea or vomiting  Genito-Urinary ROS: No dysuria or hematuria  Psych ROS: No anxiety and depression  14 point ROS reviewed with the patient and negative except as noted above and in the HPI unless unable to obtain.     Objective:  Patient Vitals for the past 24 hrs:    BP Temp Temp src Pulse Resp SpO2   24 1609 143/62 97.4 °F (36.3 °C) Oral 72 16 99 %   24 1422 137/66 97.2 °F (36.2 °C) Oral 70 16 98 %   24 1343 125/65 96.8 °F (36 °C) Oral 63 14 98 %   24 1335 125/65 97 °F (36.1 °C) Temporal 68 14 98 %   24 1325 140/91 -- -- 65 14 100 %   24 1320 127/67 -- -- 59 14 100 %   24 1315 124/73 -- -- 67 12 99 %   24 1310 162/81 -- -- 68 12 100 %   24 1309 162/81 97.2 °F (36.2 °C) Temporal 67 12 100 %   24 1102 139/74 97.2 °F (36.2 °C) Oral 68 16 100 %   24 0742 126/68 96.8 °F (36 °C) Oral 62 16 100 %   24 0440 116/61 97.4 °F (36.3 °C) Oral 63 16 100 %   24 2355 113/62 97.2 °F (36.2 °C) Oral 62 16 100 %   24 127/53 97.4 °F (36.3 °C) Oral 60 16 100 %         Intake/Output Summary (Last 24 hours) at 2024 1855  Last data filed at 2024 1707      Gross per 24 hour   Intake 240 ml   Output --   Net 240 ml      General: awake/alert   Chest:  clear to auscultation bilaterally without respiratory distress  CVS: regular rate and rhythm  Abdominal: soft, nontender, positive bowel sounds  Extremities: ble edema  Skin: warm  and dry without rash        Labs:            Results from last 7 days   Lab Units 04/07/24  1429 04/07/24  0422 04/06/24  2352 04/06/24  1344 04/06/24  0340 04/05/24  0512   WBC 10*3/mm3  --  5.70  --   --  5.43 6.93   HEMOGLOBIN g/dL 9.5* 9.2* 9.3*   < > 9.5* 10.9*   HEMATOCRIT % 30.5* 29.5* 28.8*   < > 30.2* 34.5*   PLATELETS 10*3/mm3  --  275  --   --  294 321    < > = values in this interval not displayed.                   Results from last 7 days   Lab Units 04/07/24  0422 04/06/24  0340 04/05/24  0512   SODIUM mmol/L 141 141 139  139   POTASSIUM mmol/L 4.0 4.5 4.4  4.4   CHLORIDE mmol/L 103 104 102  102   CO2 mmol/L 27.0 28.0 25.0  25.0   BUN mg/dL 33* 33* 31*  31*   CREATININE mg/dL 1.75* 2.10* 1.98*  1.98*   CALCIUM mg/dL 10.1 10.7* 11.1*  11.1*   EGFR mL/min/1.73 42.1* 33.9* 36.3*  36.3*   BILIRUBIN mg/dL  --   --  0.4   ALK PHOS U/L  --   --  138*   ALT (SGPT) U/L  --   --  18   AST (SGOT) U/L  --   --  22   GLUCOSE mg/dL 94 86 173*  173*         Radiology:   Imaging Results (Last 72 Hours)         Procedure Component Value Units Date/Time     NM GI Blood Loss [[id number]] Collected: 04/06/24 1327       Updated: 04/06/24 1334     Narrative:       EXAM: NM GI BLOOD LOSS-     INDICATION: Lower GI bleed (Ped 0-17y)     RADIOPHARMACEUTICAL: Tc-99m labeled red cells 27.6 mCi IV     TECHNICAL: The patient's red blood cells were labeled in vitro and  reinjected.  Sequential five-minute anterior planar images of the  abdomen were obtained for two hours.  These were also reformatted into  cine mode.     COMPARISON: CT abdomen pelvis 7/22/2023     FINDINGS:     No evidence of an active GI bleed.        Impression:          No evidence of an active GI bleed.      This report was signed and finalized on 4/6/2024 1:31 PM by Ronal Wisdom.                   Culture:          Blood Culture   Date Value Ref Range Status   04/01/2024 No growth at 24 hours   Preliminary   03/30/2024 No growth at 3 days    Preliminary   03/29/2024 Staphylococcus aureus, MRSA (C)   Final       Comment:          Methicillin resistant Staphylococcus aureus, Patient may be an isolation risk.  Infectious disease consultation is highly recommended to rule out distant foci of infection.            Assessment   Acute kidney injury  Chronic kidney disease stage IIIb  Diabetes type 2 with diabetic nephropathy  Hypertension  Hypokalemia  Anemia and chronic kidney disease  MRSA bacteremia     Plan:  Discussed with patient, nursing, family  Workup reviewed today  ID and podiatry evaluations reviewed  Holding Bumex for now-planning to restart today  Antibiotics per ID recommendations           Willy Arteaga MD  4/7/2024  18:55 CDT              Stewart Alvarez, APRN   Nurse Practitioner  Nephrology     Progress Notes      Cosign Needed     Date of Service: 04/08/24 1131  Creation Time: 04/08/24 1131     Cosign Needed       Expand All Collapse All    Nephrology ([facility]) Progress Note        Patient:  Erick Luong  YOB: 1955  Date of Service: 4/8/2024  MRN: [id number]        Acct: [id number]      Primary Care Physician: Del Shetty MD  Advance Directive:       Code Status and Medical Interventions:   Ordered at: 03/26/24 1815     Level Of Support Discussed With:     Health Care Surrogate     Code Status (Patient has no pulse and is not breathing):     CPR (Attempt to Resuscitate)     Medical Interventions (Patient has pulse or is breathing):     Full Support      Admit Date: 3/26/2024                        Hospital Day: 13  Referring Provider: No ref. provider found        Patient personally seen and examined.  Complete chart including Consults, Notes, Operative Reports, Labs, Cardiology, and Radiology studies reviewed as able.           Subjective:  Erick Luong is a 68 y.o. male for whom we were consulted for evaluation and treatment of acute kidney injury.  He has stage IIIb  chronic kidney disease baseline, follows with Dr Lomas in our office.  Baseline creatinine approximately 1.8. He has history of insulin-dependent type 2 diabetes, hypertension, abdominal obesity, obstructive sleep apnea, diabetic foot ulcer and coronary artery disease.   Patient presented to ER on 3/26 with altered mental status. Had debridement of ongoing wound on left foot the day prior. Left foot warm and erythremic on arrival to ER. Noted to have elevated blood glucose over 400. Initial creatinine of 1.9.  Admitted to medical floor for sepsis due to left foot wound. Patient has been agitated and confused during the admission, requiring wrist restraints due to pulling at lines and tubes. Renal function and mentation have been improving. Now has a sitter present in room due to repeatedly getting up unattended. He is s/p colonoscopy on 4/07 due to rectal bleding.      Today is awake and alert. No new complaint or overnight issues. Urine output nonoliguric      Allergies:  Cefepime, Bactrim [sulfamethoxazole-trimethoprim], Vancomycin, Zolpidem, Zolpidem tartrate, and Metronidazole     Home Meds:  Prescriptions Prior to Admission           Medications Prior to Admission   Medication Sig Dispense Refill Last Dose    amitriptyline (ELAVIL) 25 MG tablet Take 2 tablets by mouth Daily at bedtime. (Patient not taking: Reported on 3/27/2024) 60 tablet 1 Not Taking    apixaban (ELIQUIS) 5 MG tablet tablet Take 1 tablet by mouth Every 12 (Twelve) Hours.          ascorbic acid (VITAMIN C) 1000 MG tablet Take 1 tablet by mouth Daily. 30 tablet 3      Aspirin 81 MG capsule Take 81 mg by mouth Daily.          bumetanide (BUMEX) 1 MG tablet Take 1 tablet every day by oral route for 30 days. 30 tablet 0      bumetanide (BUMEX) 2 MG tablet Take 1 tablet by mouth Daily. Take 2 tablets in morning;1 tablet at night (Patient not taking: Reported on 3/27/2024)     Not Taking    busPIRone (BUSPAR) 10 MG tablet Take 1 tablet by mouth 3  (Three) Times a Day.          calcitriol (ROCALTROL) 0.5 MCG capsule Take 1 capsule by mouth Daily. 90 capsule 4      calcitriol (ROCALTROL) 0.5 MCG capsule Take 1 capsule every day by oral route for 90 days. (Patient not taking: Reported on 3/27/2024) 90 capsule 4 Not Taking    carvedilol (COREG) 3.125 MG tablet Take 1 tablet twice a day by oral route. 60 tablet 4      carvedilol (COREG) 6.25 MG tablet Take 1 tablet by mouth 2 (Two) Times a Day. (Patient not taking: Reported on 3/27/2024) 180 tablet 4 Not Taking    Cholecalciferol (D-5000) 125 MCG (5000 UT) tablet Take 1 tablet by mouth Daily Before Lunch. 30 tablet 2      cloNIDine (CATAPRES) 0.1 MG tablet Take 1 tablet by mouth Every 12 (Twelve) Hours. (Patient not taking: Reported on 3/27/2024) 60 tablet 2 Not Taking    Diclofenac Sodium (VOLTAREN) 1 % gel gel Apply 2 g topically to the appropriate area as directed 4 (Four) Times a Day As Needed. 300 g 11      Diclofenac Sodium (VOLTAREN) 1 % gel gel Apply 2 g topically to the appropriate area as directed 4 (Four) Times a Day. (Patient not taking: Reported on 3/27/2024) 300 g 11 Not Taking    donepezil (ARICEPT) 10 MG tablet Take 1 tablet by mouth Daily. (Patient not taking: Reported on 3/27/2024) 90 tablet 4 Not Taking    Dulaglutide (Trulicity) 4.5 MG/0.5ML solution pen-injector Inject 0.5 mL under the skin into the appropriate area as directed 1 (One) Time Per Week. (Patient not taking: Reported on 3/27/2024) 2 mL 11 Not Taking    DULoxetine (CYMBALTA) 60 MG capsule Take 1 capsule by mouth Daily. 90 capsule 4      DULoxetine (CYMBALTA) 60 MG capsule Take 1 capsule by mouth Daily. (Patient not taking: Reported on 3/27/2024) 90 capsule 4 Not Taking    DULoxetine (CYMBALTA) 60 MG capsule Take 1 capsule by mouth Daily. (Patient not taking: Reported on 3/27/2024) 90 capsule 4 Not Taking    empagliflozin (JARDIANCE) 25 MG tablet tablet Take 1 tablet by mouth Daily. (Patient not taking: Reported on 3/27/2024) 30  tablet 2 Not Taking    famotidine (PEPCID) 20 MG tablet Take 1 tablet twice a day by oral route. 180 tablet 4      fluticasone (FLONASE) 50 MCG/ACT nasal spray 1 spray into the nostril(s) as directed by provider Daily. (Patient taking differently: 1 spray into the nostril(s) as directed by provider 2 (Two) Times a Day.) 16 g 1      HYDROcodone-acetaminophen (NORCO) 7.5-325 MG per tablet Take 1 tablet by mouth 2 (Two) Times a Day As Needed. (Patient not taking: Reported on 3/27/2024) 40 tablet 0 Not Taking    Insulin Glargine (Lantus SoloStar) 100 UNIT/ML injection pen Inject 20 Units under the skin into the appropriate area as directed Every Evening. 15 mL 3      Insulin Regular Human, Conc, (HumuLIN R U-500 KwikPen) 500 UNIT/ML solution pen-injector CONCENTRATED injection Inject 120 Units under the skin into the appropriate area as directed 2 (Two) Times a Day with breakfast and dinner. (Patient not taking: Reported on 3/27/2024) 18 mL 5 Not Taking    Insulin Regular Human, Conc, (HumuLIN R U-500 KwikPen) 500 UNIT/ML solution pen-injector CONCENTRATED injection Inject 40 Units under the skin into the appropriate area with regular meals AND 40 Units with large meals. 54 mL 3      isosorbide mononitrate (IMDUR) 30 MG 24 hr tablet Take 1 tablet by mouth Daily.          magnesium hydroxide (Milk of Magnesia) 400 MG/5ML suspension Take 30 mL every day by oral route for 30 days. 900 mL 0      magnesium hydroxide (Milk of Magnesia) 400 MG/5ML suspension Take 30mL by mouth once daily for 30 days. (Patient not taking: Reported on 3/27/2024) 900 mL 0 Not Taking    melatonin 3 MG tablet Take 1 tablet by mouth At Night As Needed for Sleep.          methylcellulose (Citrucel) oral powder Mix 5g as directed and drink twice daily for 90 days. 900 g 10      metoclopramide (REGLAN) 5 MG tablet Take 1 tablet by mouth 3 times a day.          midodrine (PROAMATINE) 2.5 MG tablet Take 1 tablet by mouth 3 (Three) Times a Day Before  Meals.          nitroglycerin (NITROSTAT) 0.4 MG SL tablet Dissolve 1 tablet by mouth every 5 minutes as needed for chest pain; MAX 3 tabs/24 hours.  If no improvement after 3 tabs go to ER 25 tablet 0      ondansetron (ZOFRAN) 4 MG tablet Take 1 tablet by mouth Every 6 (Six) Hours As Needed for Nausea or Vomiting.          oxyCODONE (ROXICODONE) 10 MG tablet Take 1 tablet by mouth twice a day as needed 60 tablet 0      pantoprazole (Protonix) 40 MG EC tablet Take 1 tablet by mouth 2 (Two) Times a Day. (Patient not taking: Reported on 3/27/2024) 180 tablet 4 Not Taking    polyethylene glycol (MIRALAX) 17 g packet Take 17 g by mouth Daily. Obtain OTC          prazosin (MINIPRESS) 1 MG capsule Take 1 capsule by mouth every night at bedtime. 30 capsule 2      pregabalin (LYRICA) 100 MG capsule Take 2 capsules by mouth Every Night.          pregabalin (LYRICA) 50 MG capsule Take 1 capsule by mouth Daily.          rosuvastatin (CRESTOR) 10 MG tablet Take 1 tablet by mouth Daily.          sennosides-docusate (PERICOLACE) 8.6-50 MG per tablet Take 1 tablet by mouth Every Night. Obtain OTC          sennosides-docusate (PERICOLACE) 8.6-50 MG per tablet Take 1 tablet by mouth every night at bedtime. (Patient not taking: Reported on 3/27/2024) 30 tablet 2 Not Taking    sodium hypochlorite (DAKIN'S 1/4 STRENGTH) 0.125 % solution topical solution 0.125% Apply to affected area twice daily (Patient not taking: Reported on 3/27/2024) 473 mL 3 Not Taking    sodium hypochlorite (DAKIN'S 1/4 STRENGTH) 0.125 % solution topical solution 0.125% Apply to affected area twice daily as directed (Patient not taking: Reported on 3/27/2024) 473 mL 5 Not Taking    sodium hypochlorite (DAKIN'S) 0.25 % topical solution Use to wound daily as instructed 473 mL 1      sucralfate (CARAFATE) 1 g tablet Take 1 tablet by mouth 3 times a day.          tamsulosin (FLOMAX) 0.4 MG capsule 24 hr capsule Take 1 capsule by mouth Daily. 90 capsule 1      timolol  (TIMOPTIC) 0.5 % ophthalmic solution Administer 1 drop to both eyes 2 (Two) Times a Day.          valsartan (DIOVAN) 40 MG tablet Take 1 tablet by mouth Daily.          zinc sulfate (ZINCATE) 50 MG capsule Take 1 capsule by mouth Daily.                  Medicines:  Current Medications             Current Facility-Administered Medications   Medication Dose Route Frequency Provider Last Rate Last Admin    acetaminophen (TYLENOL) tablet 650 mg  650 mg Oral Q4H PRN Raymond Mederos MD   650 mg at 04/04/24 0002     Or    acetaminophen (TYLENOL) 160 MG/5ML oral solution 650 mg  650 mg Oral Q4H PRN Raymond Mederos MD   650 mg at 03/27/24 2109     Or    acetaminophen (TYLENOL) suppository 650 mg  650 mg Rectal Q4H PRN Raymond Mederos MD        [Held by provider] apixaban (ELIQUIS) tablet 5 mg  5 mg Oral Q12H Raquel Duncan APRN   5 mg at 04/05/24 0933    ascorbic acid (VITAMIN C) tablet 1,000 mg  1,000 mg Oral Daily Raymond Mederos MD   1,000 mg at 04/08/24 0908    [Held by provider] aspirin EC tablet 81 mg  81 mg Oral Daily Rene Maloney MD   81 mg at 04/05/24 0933    sennosides-docusate (PERICOLACE) 8.6-50 MG per tablet 1 tablet  1 tablet Oral BID Raymond Mederos MD   1 tablet at 04/07/24 2050     And    polyethylene glycol (MIRALAX) packet 17 g  17 g Oral Daily Raymond Mederos MD   17 g at 04/06/24 0932     And    bisacodyl (DULCOLAX) EC tablet 5 mg  5 mg Oral Daily PRN Raymond Mederos MD         And    bisacodyl (DULCOLAX) suppository 10 mg  10 mg Rectal Daily PRN Raymond Mederos MD        bumetanide (BUMEX) tablet 1 mg  1 mg Oral Daily Willy Arteaga MD   1 mg at 04/08/24 0908    carvedilol (COREG) tablet 3.125 mg  3.125 mg Oral BID With Meals Rene Maloney MD   3.125 mg at 04/08/24 0908    dextrose (D50W) (25 g/50 mL) IV injection 25 g  25 g Intravenous Q15 Min PRN Raymond Mederos MD        dextrose (GLUTOSE) oral gel 15 g  15 g Oral Q15 Min PRN Raymond Mederos MD   15 g at 03/27/24 1719     Diclofenac Sodium (VOLTAREN) 1 % gel 2 g  2 g Topical 4x Daily PRN Raymond Mederos MD        dicyclomine (BENTYL) capsule 20 mg  20 mg Oral TID PRN Raymond Mederos MD   20 mg at 04/07/24 2050    donepezil (ARICEPT) tablet 10 mg  10 mg Oral Daily Raymond Mederos MD   10 mg at 04/08/24 0911    [Transfer Hold] glucagon (GLUCAGEN) injection 1 mg  1 mg Intramuscular Q15 Min PRN Raquel Duncan, APRN        hydrocortisone (ANUSOL-HC) suppository 25 mg  25 mg Rectal BID Raymond Mederos MD   25 mg at 04/08/24 0912    insulin detemir (LEVEMIR) injection 30 Units  30 Units Subcutaneous Nightly Raymond Mederos MD   30 Units at 04/07/24 2135    Insulin Lispro (humaLOG) injection 4-24 Units  4-24 Units Subcutaneous 4x Daily AC & at Bedtime Raymond Mederos MD   8 Units at 04/07/24 2136    insulin regular (humuLIN R,novoLIN R) injection 10 Units  10 Units Subcutaneous TID AC Raymond Mederos MD   10 Units at 04/08/24 0919    isosorbide mononitrate (IMDUR) 24 hr tablet 30 mg  30 mg Oral Q24H Raymond Mederos MD   30 mg at 04/08/24 0908    lactulose solution 20 g  20 g Oral TID Raymond Mederos MD   20 g at 04/08/24 0910    linezolid (ZYVOX) tablet 600 mg  600 mg Oral Q12H Raymond Mederos MD   600 mg at 04/08/24 0909    magnesium hydroxide (MILK OF MAGNESIA) suspension 10 mL  10 mL Oral Daily PRN Raymond Mederos MD   10 mL at 04/03/24 0414    melatonin tablet 6 mg  6 mg Oral Nightly Raymond Mederos MD   6 mg at 04/07/24 2050    midodrine (PROAMATINE) tablet 2.5 mg  2.5 mg Oral TID AC Raymond Mederos MD   2.5 mg at 04/08/24 0911    mupirocin (BACTROBAN) 2 % nasal ointment 1 Application  1 Application Each Nare BID Raymond Mederos MD   1 Application at 04/08/24 0909    nitroglycerin (NITROSTAT) SL tablet 0.4 mg  0.4 mg Sublingual Q5 Min PRN Raymond Mederos MD        ondansetron ODT (ZOFRAN-ODT) disintegrating tablet 4 mg  4 mg Oral Q6H PRN Raymond Mederos MD   4 mg at 04/03/24 0537     Or    ondansetron (ZOFRAN) injection 4 mg  4 mg  Intravenous Q6H PRN Raymond Mederos MD   4 mg at 03/29/24 1837    oxyCODONE (ROXICODONE) immediate release tablet 10 mg  10 mg Oral BID PRN Raymond Mederos MD   10 mg at 04/08/24 0918    pregabalin (LYRICA) capsule 100 mg  100 mg Oral Nightly Rene Maloney MD   100 mg at 04/07/24 2050    pregabalin (LYRICA) capsule 50 mg  50 mg Oral Daily Rene Maloney MD   50 mg at 04/08/24 0918    rosuvastatin (CRESTOR) tablet 10 mg  10 mg Oral Nightly Raymond Mederos MD   10 mg at 04/07/24 2050    sodium chloride 0.9 % flush 10 mL  10 mL Intravenous PRN Raymond Mederos MD        sodium chloride 0.9 % flush 10 mL  10 mL Intravenous Q12H Raymond Mederos MD   10 mL at 04/07/24 2050    sodium chloride 0.9 % flush 10 mL  10 mL Intravenous PRN Raymond Mederos MD        sodium chloride 0.9 % infusion 40 mL  40 mL Intravenous PRN Raymond Mederos MD        sucralfate (CARAFATE) 1 GM/10ML suspension 1 g  1 g Oral TID AC Raymond Mederos MD   1 g at 04/08/24 0913    tamsulosin (FLOMAX) 24 hr capsule 0.4 mg  0.4 mg Oral Daily Raymond Mederos MD   0.4 mg at 04/08/24 0910    timolol (TIMOPTIC) 0.25 % ophthalmic solution 1 drop  1 drop Both Eyes Q12H Raymond Mederos MD   1 drop at 04/08/24 0911    zinc sulfate (ZINCATE) capsule 220 mg  220 mg Oral Daily Raymond Mederos MD   220 mg at 04/08/24 0909            Past Medical History:  Medical History        Past Medical History:   Diagnosis Date    Arthritis      Autonomic disease      CAD (coronary artery disease) 02/06/2017    Cervical radiculopathy 09/16/2021    Chronic constipation with acute exaccerbation 05/10/2021    Coronary artery disease      Degeneration of cervical intervertebral disc 08/11/2021    Diabetes mellitus      Diabetic foot ulcer 08/31/2020    Diabetic polyneuropathy associated with type 2 diabetes mellitus 01/18/2021    Elevated cholesterol      Gastroesophageal reflux disease 05/13/2019    Headache      HTN (hypertension), benign 05/03/2017    Hyperlipidemia       Hypertension      Mixed hyperlipidemia 02/07/2017    Multiple lung nodules 01/26/2020     5mm, 9 mm RLL identified 1/2020, not present 10/2019.    Myocardial infarction      Osteomyelitis 01/22/2020    Osteomyelitis of fifth toe of right foot 10/07/2019    Pancreatitis      Persistent insomnia 01/20/2020    Renal disorder      Sleep apnea 02/06/2017    Sleep apnea with use of continuous positive airway pressure (CPAP)       NON-COMPLIANT    Slow transit constipation 01/16/2019    Spinal stenosis in cervical region 09/16/2021    Vitamin D deficiency 03/02/2021            Past Surgical History:  Surgical History         Past Surgical History:   Procedure Laterality Date    ABDOMINAL SURGERY        AMPUTATION FOOT / TOE Left 10/2021     5th digit     ANTERIOR CERVICAL DISCECTOMY W/ FUSION N/A 8/5/2022     Procedure: CERVICAL DISCECTOMY ANTERIOR WITH FUSION C5-6 with possible plating of C5-7 with neuromonitoring and 1 c-arm;  Surgeon: Karel Soliz MD;  Location:  [facility] OR;  Service: Neurosurgery;  Laterality: N/A;    APPENDECTOMY        BACK SURGERY        CARDIAC CATHETERIZATION Left 02/08/2021     Procedure: Left Heart Cath w poss intervention left anatomical snuff box acess;  Surgeon: Omkar Charles DO;  Location:  [facility];  Service: Cardiology;  Laterality: Left;    CARDIAC SURGERY        CATARACT EXTRACTION        CERVICAL SPINE SURGERY        COLONOSCOPY N/A 01/31/2017     Normal exam repeat in 5 years    COLONOSCOPY N/A 02/11/2019     Mild acute inflammation    COLONOSCOPY N/A 4/7/2024     Procedure: COLONOSCOPY WITH ANESTHESIA;  Surgeon: Raymond Mederos MD;  Location:  [facility];  Service: Gastroenterology;  Laterality: N/A;  Pre: Anemia, Rectal bleed;  Post: Visible vessel at 20cm, clip x6 placed;  Del Shetty MD    COLONOSCOPY W/ POLYPECTOMY   03/04/2014     Hyperplastic polyp    CORONARY ARTERY BYPASS GRAFT   10/2015    ENDOSCOPY   04/13/2011     Gastritis with  hemorrhage    ENDOSCOPY N/A 2017     Normal exam    ENDOSCOPY N/A 2019     Gastritis    ENDOSCOPY N/A 2020     Non-erosive gastritis with hemorrhage    ENDOSCOPY N/A 02/10/2021     Esophagitis    FOOT SURGERY Left      INCISION AND DRAINAGE OF WOUND Left 2007     spider bite            Family History        Family History   Problem Relation Age of Onset    Colon cancer Father      Heart disease Father      Colon cancer Sister      Colon polyps Sister      Alzheimer's disease Mother      Coronary artery disease Sister      Coronary artery disease Sister           Social History  Social History   Social History            Socioeconomic History    Marital status:    Tobacco Use    Smoking status: Former       Current packs/day: 0.00       Types: Cigarettes       Quit date:        Years since quittin.2    Smokeless tobacco: Never    Tobacco comments:       smoked in [employer]   Vaping Use    Vaping status: Never Used   Substance and Sexual Activity    Alcohol use: No    Drug use: No    Sexual activity: Defer            Review of Systems:  History obtained from chart review and the patient  General ROS: No fever or chills  Respiratory ROS: No cough, shortness of breath, wheezing  Cardiovascular ROS: No chest pain or palpitations  Gastrointestinal ROS: No abdominal pain or melena  Genito-Urinary ROS: No dysuria or hematuria  Psych ROS: No anxiety and depression  14 point ROS reviewed with the patient and negative except as noted above and in the HPI unless unable to obtain.     Objective:  Patient Vitals for the past 24 hrs:    BP Temp Temp src Pulse Resp SpO2   24 0726 136/52 98 °F (36.7 °C) Axillary 70 16 97 %   24 0427 119/51 98.2 °F (36.8 °C) Oral 67 16 100 %   249 112/48 97.8 °F (36.6 °C) Oral 70 16 96 %   24 1609 143/62 97.4 °F (36.3 °C) Oral 72 16 99 %   24 1422 137/66 97.2 °F (36.2 °C) Oral 70 16 98 %   24 1343 125/65 96.8 °F (36 °C)  Oral 63 14 98 %   04/07/24 1335 125/65 97 °F (36.1 °C) Temporal 68 14 98 %   04/07/24 1325 140/91 -- -- 65 14 100 %   04/07/24 1320 127/67 -- -- 59 14 100 %   04/07/24 1315 124/73 -- -- 67 12 99 %   04/07/24 1310 162/81 -- -- 68 12 100 %   04/07/24 1309 162/81 97.2 °F (36.2 °C) Temporal 67 12 100 %         Intake/Output Summary (Last 24 hours) at 4/8/2024 1131  Last data filed at 4/8/2024 1100      Gross per 24 hour   Intake 240 ml   Output 500 ml   Net -260 ml      General: awake/alert   Chest:  clear to auscultation bilaterally without respiratory distress  CVS: regular rate and rhythm  Abdominal: soft, nontender, positive bowel sounds  Extremities:  bilateral 1+ edema  Skin: warm and dry without rash        Labs:             Results from last 7 days   Lab Units 04/08/24  0623 04/07/24  2333 04/07/24  1954 04/07/24  1429 04/07/24  0422 04/06/24  1344 04/06/24  0340   WBC 10*3/mm3 6.19  --   --   --  5.70  --  5.43   HEMOGLOBIN g/dL 8.7* 8.4* 8.4*   < > 9.2*   < > 9.5*   HEMATOCRIT % 27.0* 24.7* 25.8*   < > 29.5*   < > 30.2*   PLATELETS 10*3/mm3 246  --   --   --  275  --  294    < > = values in this interval not displayed.                    Results from last 7 days   Lab Units 04/08/24  0623 04/07/24  0422 04/06/24  0340 04/05/24  0512   SODIUM mmol/L 141 141 141 139  139   POTASSIUM mmol/L 4.0 4.0 4.5 4.4  4.4   CHLORIDE mmol/L 104 103 104 102  102   CO2 mmol/L 25.0 27.0 28.0 25.0  25.0   BUN mg/dL 30* 33* 33* 31*  31*   CREATININE mg/dL 1.61* 1.75* 2.10* 1.98*  1.98*   CALCIUM mg/dL 9.1 10.1 10.7* 11.1*  11.1*   EGFR mL/min/1.73 46.6* 42.1* 33.9* 36.3*  36.3*   BILIRUBIN mg/dL 0.4  --   --  0.4   ALK PHOS U/L 107  --   --  138*   ALT (SGPT) U/L 14  --   --  18   AST (SGOT) U/L 16  --   --  22   GLUCOSE mg/dL 82 94 86 173*  173*         Radiology:   Imaging Results (Last 72 Hours)         Procedure Component Value Units Date/Time     NM GI Blood Loss [[id number]] Collected: 04/06/24 1327       Updated:  04/06/24 1334     Narrative:       EXAM: NM GI BLOOD LOSS-     INDICATION: Lower GI bleed (Ped 0-17y)     RADIOPHARMACEUTICAL: Tc-99m labeled red cells 27.6 mCi IV     TECHNICAL: The patient's red blood cells were labeled in vitro and  reinjected.  Sequential five-minute anterior planar images of the  abdomen were obtained for two hours.  These were also reformatted into  cine mode.     COMPARISON: CT abdomen pelvis 7/22/2023     FINDINGS:     No evidence of an active GI bleed.        Impression:          No evidence of an active GI bleed.      This report was signed and finalized on 4/6/2024 1:31 PM by Ronal Wisdom.                   Culture:          Blood Culture   Date Value Ref Range Status   03/26/2024 Staphylococcus aureus, MRSA (C)   Final       Comment:          Infectious disease consultation is highly recommended to rule out distant foci of infection.  Methicillin resistant Staphylococcus aureus, Patient may be an isolation risk.   03/26/2024 Staphylococcus aureus, MRSA (C)   Final       Comment:          Infectious disease consultation is highly recommended to rule out distant foci of infection.  Methicillin resistant Staphylococcus aureus, Patient may be an isolation risk.            Assessment    Acute kidney injury, ATN--resolving  Baseline chronic kidney disease stage 3b  Type 2 diabetes, poorly controlled (A1c 10.8%)  Hypertension   Metabolic encephalopathy--improved  Anemia of CKD  Hypokalemia--improved  Sepsis due to infection of left foot wound  Hypercalcemia--improving     Plan:  Renal function stable  Monitor jamar Alvarez, SUSAN  4/8/2024  11:31 CDT                  2050 -- -- -- -- -- room air -- --   04/07/24 2029 97.8 (36.6) 70 16 112/48 Lying room air -- 96   04/07/24 1609 97.4 (36.3) 72 16 143/62 Lying room air -- 99   04/07/24 1422 97.2 (36.2) 70 16 137/66 Lying room air -- 98   04/07/24 1343 96.8 (36) 63 14 125/65 Lying room air -- 98   04/07/24 1335 97 (36.1) 68 14  125/65 -- room air -- 98   04/07/24 1325 -- 65 14 140/91 -- room air -- 100   04/07/24 1320 -- 59 14 127/67 -- room air -- 100   04/07/24 1315 -- 67 12 124/73 -- simple face mask 8 99   04/07/24 1310 -- 68 12 162/81 -- simple face mask 8 100   04/07/24 1309 97.2 (36.2) 67 12 162/81 Lying simple face mask 8 100   04/07/24 1102 97.2 (36.2) 68 16 139/74 Sitting room air -- 100   04                  Current Facility-Administered Medications   Medication Dose Route Frequency Provider Last Rate Last Admin    acetaminophen (TYLENOL) tablet 650 mg  650 mg Oral Q4H PRN Raymond Mederos MD   650 mg at 04/04/24 0002    Or    acetaminophen (TYLENOL) 160 MG/5ML oral solution 650 mg  650 mg Oral Q4H PRN Raymond Mederos MD   650 mg at 03/27/24 2109    Or    acetaminophen (TYLENOL) suppository 650 mg  650 mg Rectal Q4H PRN Raymond Mederos MD        [Held by provider] apixaban (ELIQUIS) tablet 5 mg  5 mg Oral Q12H Raquel Duncan APRN   5 mg at 04/05/24 0933    ascorbic acid (VITAMIN C) tablet 1,000 mg  1,000 mg Oral Daily Raymond Mederos MD   1,000 mg at 04/08/24 0908    [Held by provider] aspirin EC tablet 81 mg  81 mg Oral Daily Reen Maloney MD   81 mg at 04/05/24 0933    sennosides-docusate (PERICOLACE) 8.6-50 MG per tablet 1 tablet  1 tablet Oral BID Raymond Mederos MD   1 tablet at 04/07/24 2050    And    polyethylene glycol (MIRALAX) packet 17 g  17 g Oral Daily Raymond Mederos MD   17 g at 04/06/24 0932    And    bisacodyl (DULCOLAX) EC tablet 5 mg  5 mg Oral Daily PRN Raymond Mederos MD        And    bisacodyl (DULCOLAX) suppository 10 mg  10 mg Rectal Daily PRN Raymond Mederos MD        bumetanide (BUMEX) tablet 1 mg  1 mg Oral Daily Willy Arteaga MD   1 mg at 04/08/24 0908    carvedilol (COREG) tablet 3.125 mg  3.125 mg Oral BID With Meals Rene Maloney MD   3.125 mg at 04/08/24 0908    dextrose (D50W) (25 g/50 mL) IV injection 25 g  25 g Intravenous Q15 Min PRN Raymond Mederos MD         dextrose (GLUTOSE) oral gel 15 g  15 g Oral Q15 Min PRN Raymond Mederos MD   15 g at 03/27/24 1710    Diclofenac Sodium (VOLTAREN) 1 % gel 2 g  2 g Topical 4x Daily PRN Raymond Mederos MD        dicyclomine (BENTYL) capsule 20 mg  20 mg Oral TID PRN Raymond Mederos MD   20 mg at 04/07/24 2050    donepezil (ARICEPT) tablet 10 mg  10 mg Oral Daily Raymond Mederos MD   10 mg at 04/08/24 0911    [Transfer Hold] glucagon (GLUCAGEN) injection 1 mg  1 mg Intramuscular Q15 Min PRN Raquel Duncan, APRN        hydrocortisone (ANUSOL-HC) suppository 25 mg  25 mg Rectal BID Raymond Mederos MD   25 mg at 04/08/24 0912    insulin detemir (LEVEMIR) injection 30 Units  30 Units Subcutaneous Nightly Raymond Mederos MD   30 Units at 04/07/24 2135    Insulin Lispro (humaLOG) injection 4-24 Units  4-24 Units Subcutaneous 4x Daily AC & at Bedtime Raymond Mederos MD   8 Units at 04/07/24 2136    insulin regular (humuLIN R,novoLIN R) injection 10 Units  10 Units Subcutaneous TID AC Raymond Mederos MD   10 Units at 04/08/24 0919    isosorbide mononitrate (IMDUR) 24 hr tablet 30 mg  30 mg Oral Q24H Raymond Mederos MD   30 mg at 04/08/24 0908    lactulose solution 20 g  20 g Oral TID Raymond Mederos MD   20 g at 04/08/24 0910    linezolid (ZYVOX) tablet 600 mg  600 mg Oral Q12H Raymond Mederos MD   600 mg at 04/08/24 0909    magnesium hydroxide (MILK OF MAGNESIA) suspension 10 mL  10 mL Oral Daily PRN Raymond Mederos MD   10 mL at 04/03/24 0414    melatonin tablet 6 mg  6 mg Oral Nightly Raymond Mederos MD   6 mg at 04/07/24 2050    midodrine (PROAMATINE) tablet 2.5 mg  2.5 mg Oral TID AC Raymond Mederos MD   2.5 mg at 04/08/24 1149    mupirocin (BACTROBAN) 2 % nasal ointment 1 Application  1 Application Each Nare BID Raymond Mederos MD   1 Application at 04/08/24 0909    nitroglycerin (NITROSTAT) SL tablet 0.4 mg  0.4 mg Sublingual Q5 Min PRN Raymond Mederos MD        ondansetron ODT (ZOFRAN-ODT) disintegrating tablet 4 mg  4 mg Oral Q6H  PRN Raymond Mederos MD   4 mg at 04/03/24 0537    Or    ondansetron (ZOFRAN) injection 4 mg  4 mg Intravenous Q6H PRN Raymond Mederos MD   4 mg at 03/29/24 1837    oxyCODONE (ROXICODONE) immediate release tablet 10 mg  10 mg Oral BID PRN Raymond Mederos MD   10 mg at 04/08/24 0918    pregabalin (LYRICA) capsule 100 mg  100 mg Oral Nightly Rene Maloney MD   100 mg at 04/07/24 2050    pregabalin (LYRICA) capsule 50 mg  50 mg Oral Daily Rene Maloney MD   50 mg at 04/08/24 0918    rosuvastatin (CRESTOR) tablet 10 mg  10 mg Oral Nightly Raymond Mederos MD   10 mg at 04/07/24 2050    sodium chloride 0.9 % flush 10 mL  10 mL Intravenous PRN Raymond Mederos MD        sodium chloride 0.9 % flush 10 mL  10 mL Intravenous Q12H Raymond Mederos MD   10 mL at 04/07/24 2050    sodium chloride 0.9 % flush 10 mL  10 mL Intravenous PRN Raymond Mederos MD        sodium chloride 0.9 % infusion 40 mL  40 mL Intravenous PRN Raymond Mederos MD        sucralfate (CARAFATE) 1 GM/10ML suspension 1 g  1 g Oral TID AC Raymond Mederos MD   1 g at 04/08/24 1149    tamsulosin (FLOMAX) 24 hr capsule 0.4 mg  0.4 mg Oral Daily Raymond Mederos MD   0.4 mg at 04/08/24 0910    timolol (TIMOPTIC) 0.25 % ophthalmic solution 1 drop  1 drop Both Eyes Q12H Raymond Mederos MD   1 drop at 04/08/24 0911    zinc sulfate (ZINCATE) capsule 220 mg  220 mg Oral Daily Raymond Mederos MD   220 mg at 04/08/24 0909

## 2024-04-08 NOTE — PLAN OF CARE
Goal Outcome Evaluation:           Progress: improving    VSS.  RA.  Up in chair much of shift.  Medicated for pain X1.  Tolerating clear liquids.  Safety maintained.  Flat affect.  Cooperative.

## 2024-04-08 NOTE — PROGRESS NOTES
"      [facility]astroenterology  Inpatient Progress Note  Today's date:  04/08/24    Erick Luong  1955       Reason for Follow Up: GI bleed    Subjective:   Denies any further bleeding.  Does have abdominal discomfort.  Denies any nausea or vomiting.    Allergies   Allergen Reactions    Cefepime Hives and Anaphylaxis    Bactrim [Sulfamethoxazole-Trimethoprim] Other (See Comments)     \"RENAL FAILURE\"    Vancomycin Itching    Zolpidem Unknown - High Severity     \"makes him crazy\"    Zolpidem Tartrate Unknown - Low Severity and Provider Review Needed    Metronidazole Rash       Current Facility-Administered Medications:     acetaminophen (TYLENOL) tablet 650 mg, 650 mg, Oral, Q4H PRN, 650 mg at 04/04/24 0002 **OR** acetaminophen (TYLENOL) 160 MG/5ML oral solution 650 mg, 650 mg, Oral, Q4H PRN, 650 mg at 03/27/24 2109 **OR** acetaminophen (TYLENOL) suppository 650 mg, 650 mg, Rectal, Q4H PRN, Raymond Mederos MD    [Held by provider] apixaban (ELIQUIS) tablet 5 mg, 5 mg, Oral, Q12H, Raquel Duncan, APRN, 5 mg at 04/05/24 0933    ascorbic acid (VITAMIN C) tablet 1,000 mg, 1,000 mg, Oral, Daily, Raymond Mederos MD, 1,000 mg at 04/08/24 0908    [Held by provider] aspirin EC tablet 81 mg, 81 mg, Oral, Daily, Rene Maloney MD, 81 mg at 04/05/24 0933    sennosides-docusate (PERICOLACE) 8.6-50 MG per tablet 1 tablet, 1 tablet, Oral, BID, 1 tablet at 04/07/24 2050 **AND** polyethylene glycol (MIRALAX) packet 17 g, 17 g, Oral, Daily, 17 g at 04/06/24 0932 **AND** bisacodyl (DULCOLAX) EC tablet 5 mg, 5 mg, Oral, Daily PRN **AND** bisacodyl (DULCOLAX) suppository 10 mg, 10 mg, Rectal, Daily PRN, Raymond Meedros MD    bumetanide (BUMEX) tablet 1 mg, 1 mg, Oral, Daily, Willy Arteaga MD, 1 mg at 04/08/24 0908    carvedilol (COREG) tablet 3.125 mg, 3.125 mg, Oral, BID With Meals, Rene Maloney MD, 3.125 mg at 04/08/24 0908    dextrose (D50W) (25 g/50 mL) IV injection 25 g, 25 g, Intravenous, Q15 " Min PRN, Raymond Mederos MD    dextrose (GLUTOSE) oral gel 15 g, 15 g, Oral, Q15 Min PRN, Raymond Mederos MD, 15 g at 03/27/24 1710    Diclofenac Sodium (VOLTAREN) 1 % gel 2 g, 2 g, Topical, 4x Daily PRN, Raymond Mederos MD    dicyclomine (BENTYL) capsule 20 mg, 20 mg, Oral, TID PRN, Raymond Mederos MD, 20 mg at 04/07/24 2050    donepezil (ARICEPT) tablet 10 mg, 10 mg, Oral, Daily, Raymond Mederos MD, 10 mg at 04/08/24 0911    [Transfer Hold] glucagon (GLUCAGEN) injection 1 mg, 1 mg, Intramuscular, Q15 Min PRN, Raquel Duncan, APRN    hydrocortisone (ANUSOL-HC) suppository 25 mg, 25 mg, Rectal, BID, Raymond Mederos MD, 25 mg at 04/08/24 0912    insulin detemir (LEVEMIR) injection 30 Units, 30 Units, Subcutaneous, Nightly, Raymond Mederos MD, 30 Units at 04/07/24 2135    Insulin Lispro (humaLOG) injection 4-24 Units, 4-24 Units, Subcutaneous, 4x Daily AC & at Bedtime, Raymond Mederos MD, 8 Units at 04/07/24 2136    insulin regular (humuLIN R,novoLIN R) injection 10 Units, 10 Units, Subcutaneous, TID AC, Raymond Mederos MD, 10 Units at 04/08/24 0919    isosorbide mononitrate (IMDUR) 24 hr tablet 30 mg, 30 mg, Oral, Q24H, Raymond Mederos MD, 30 mg at 04/08/24 0908    lactulose solution 20 g, 20 g, Oral, TID, Raymond Mederos MD, 20 g at 04/08/24 0910    linezolid (ZYVOX) tablet 600 mg, 600 mg, Oral, Q12H, Raymond Mederos MD, 600 mg at 04/08/24 0909    magnesium hydroxide (MILK OF MAGNESIA) suspension 10 mL, 10 mL, Oral, Daily PRN, Raymond Mederos MD, 10 mL at 04/03/24 0414    melatonin tablet 6 mg, 6 mg, Oral, Nightly, Raymond Mederos MD, 6 mg at 04/07/24 2050    midodrine (PROAMATINE) tablet 2.5 mg, 2.5 mg, Oral, TID AC, Raymond Mederos MD, 2.5 mg at 04/08/24 1149    mupirocin (BACTROBAN) 2 % nasal ointment 1 Application, 1 Application, Each Nare, BID, Raymond Mederos MD, 1 Application at 04/08/24 0909    nitroglycerin (NITROSTAT) SL tablet 0.4 mg, 0.4 mg, Sublingual, Q5 Min PRN, Raymond Mederos MD    ondansetron  ODT (ZOFRAN-ODT) disintegrating tablet 4 mg, 4 mg, Oral, Q6H PRN, 4 mg at 04/03/24 0537 **OR** ondansetron (ZOFRAN) injection 4 mg, 4 mg, Intravenous, Q6H PRN, Raymond Mederos MD, 4 mg at 03/29/24 1837    oxyCODONE (ROXICODONE) immediate release tablet 10 mg, 10 mg, Oral, BID PRN, Raymond Mederos MD, 10 mg at 04/08/24 0918    pregabalin (LYRICA) capsule 100 mg, 100 mg, Oral, Nightly, Rene Maloney MD, 100 mg at 04/07/24 2050    pregabalin (LYRICA) capsule 50 mg, 50 mg, Oral, Daily, Rene Maloney MD, 50 mg at 04/08/24 0918    rosuvastatin (CRESTOR) tablet 10 mg, 10 mg, Oral, Nightly, Raymond Mederos MD, 10 mg at 04/07/24 2050    sodium chloride 0.9 % flush 10 mL, 10 mL, Intravenous, PRN, Raymond Mederos MD    sodium chloride 0.9 % flush 10 mL, 10 mL, Intravenous, Q12H, Raymond Mederos MD, 10 mL at 04/07/24 2050    sodium chloride 0.9 % flush 10 mL, 10 mL, Intravenous, PRN, Raymond Mederos MD    sodium chloride 0.9 % infusion 40 mL, 40 mL, Intravenous, PRN, Raymond Mederos MD    sucralfate (CARAFATE) 1 GM/10ML suspension 1 g, 1 g, Oral, TID AC, Raymond Mederos MD, 1 g at 04/08/24 1149    tamsulosin (FLOMAX) 24 hr capsule 0.4 mg, 0.4 mg, Oral, Daily, Raymond Mederos MD, 0.4 mg at 04/08/24 0910    timolol (TIMOPTIC) 0.25 % ophthalmic solution 1 drop, 1 drop, Both Eyes, Q12H, Raymond Mederos MD, 1 drop at 04/08/24 0911    zinc sulfate (ZINCATE) capsule 220 mg, 220 mg, Oral, Daily, Raymond Mederos MD, 220 mg at 04/08/24 0909    Review of Systems:   Review of Systems     Vital Signs:  Temp:  [97.4 °F (36.3 °C)-98.2 °F (36.8 °C)] 98 °F (36.7 °C)  Heart Rate:  [67-82] 82  Resp:  [16] 16  BP: ()/(48-64) 99/64  Body mass index is 39.22 kg/m².     Intake/Output Summary (Last 24 hours) at 4/8/2024 1548  Last data filed at 4/8/2024 1100  Gross per 24 hour   Intake 240 ml   Output 500 ml   Net -260 ml     I/O this shift:  In: -   Out: 300 [Urine:300]  Physical Exam:  Physical Exam     Patient appears to be in no  apparent distress.  HEENT: Oral mucosa moist, no icterus noted  Heart: S1, S2  Abdomen: Nondistended, nontender  Results Review:   I have reviewed all of the patient's current test results    Results from last 7 days   Lab Units 04/08/24  1218 04/08/24  0623 04/07/24  2333 04/07/24  1429 04/07/24  0422 04/06/24  1344 04/06/24  0340   WBC 10*3/mm3  --  6.19  --   --  5.70  --  5.43   HEMOGLOBIN g/dL 8.8* 8.7* 8.4*   < > 9.2*   < > 9.5*   HEMATOCRIT % 27.5* 27.0* 24.7*   < > 29.5*   < > 30.2*   PLATELETS 10*3/mm3  --  246  --   --  275  --  294    < > = values in this interval not displayed.       Results from last 7 days   Lab Units 04/08/24  0623 04/07/24  0422 04/06/24  0340 04/05/24  0512   SODIUM mmol/L 141 141 141 139  139   POTASSIUM mmol/L 4.0 4.0 4.5 4.4  4.4   CHLORIDE mmol/L 104 103 104 102  102   CO2 mmol/L 25.0 27.0 28.0 25.0  25.0   BUN mg/dL 30* 33* 33* 31*  31*   CREATININE mg/dL 1.61* 1.75* 2.10* 1.98*  1.98*   CALCIUM mg/dL 9.1 10.1 10.7* 11.1*  11.1*   BILIRUBIN mg/dL 0.4  --   --  0.4   ALK PHOS U/L 107  --   --  138*   ALT (SGPT) U/L 14  --   --  18   AST (SGOT) U/L 16  --   --  22   GLUCOSE mg/dL 82 94 86 173*  173*       Results from last 7 days   Lab Units 04/06/24  0340   INR  1.13*       Lab Results   Lab Value Date/Time    LIPASE 190 08/16/2023 1257    LIPASE 22 07/21/2023 2202    LIPASE 77 (H) 02/24/2023 2017    LIPASE 6 (L) 01/19/2023 1824    LIPASE 12 (L) 02/08/2021 2008    LIPASE 60 01/21/2020 2245    LIPASE 72 (H) 01/17/2020 2312    LIPASE 38 10/27/2019 1656    LIPASE 14 10/12/2019 0321    LIPASE 38 03/05/2019 0544    LIPASE 25 02/08/2019 0631    LIPASE 16 (L) 02/07/2019 1742    LIPASE 38 01/09/2019 1120    LIPASE 42 05/23/2017 0532    LIPASE 76 04/23/2017 2003    LIPASE 197 04/13/2017 1702    LIPASE 60 03/29/2017 0602    LIPASE 115 03/28/2017 0352    LIPASE 603 (H) 03/27/2017 0003    LIPASE 108 12/22/2016 0536    LIPASE 257 (H) 12/21/2016 0256    LIPASE 29 09/19/2016 1802     LIPASE 11 (L) 08/29/2016 1923    LIPASE 264 (H) 06/11/2015 0144    LIPASE 120 02/28/2014 1558    LIPASE 57 02/27/2014 1110       Radiology Review:  Imaging Results (Last 24 Hours)       ** No results found for the last 24 hours. **            Impression/Plan:    Sepsis    Essential hypertension    Type 2 diabetes mellitus with hyperglycemia, with long-term current use of insulin    Anemia due to chronic kidney disease    Diabetic polyneuropathy associated with type 2 diabetes mellitus    BPH without obstruction/lower urinary tract symptoms    Chronic diastolic heart failure    Chronic kidney disease (CKD), stage IV (severe)    Diabetic foot infection    68-year-old male admitted for GI bleed and status post incomplete colonoscopy but with evidence of source of bleeding in the sigmoid colon status post hemoclipping with no further bleeding.  Given the fact that the colonoscopy was incomplete, I have stressed to him about the importance of close follow-up with outpatient GI for a full colonoscopy.  Depending on his hemoglobin, he may benefit from upper endoscopy as well.  Importance of follow-up in outpatient setting stressed and he verbalized understanding.  Further recommendations based on the above.  Repeat an H&H in 1 to 2 weeks.  Follow-up with GI in 2 weeks.      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  Any results not available at time of inpatient discharge and/or GI clinic follow up should be managed by the hospitalist team, PCP, or outpatient gastroenterologist.

## 2024-04-08 NOTE — H&P (VIEW-ONLY)
"      [facility] Gastroenterology  Inpatient Progress Note  Today's date:  04/08/24    Erick Luong  1955       Reason for Follow Up: GI bleed    Subjective:   Denies any further bleeding.  Does have abdominal discomfort.  Denies any nausea or vomiting.    Allergies   Allergen Reactions    Cefepime Hives and Anaphylaxis    Bactrim [Sulfamethoxazole-Trimethoprim] Other (See Comments)     \"RENAL FAILURE\"    Vancomycin Itching    Zolpidem Unknown - High Severity     \"makes him crazy\"    Zolpidem Tartrate Unknown - Low Severity and Provider Review Needed    Metronidazole Rash       Current Facility-Administered Medications:     acetaminophen (TYLENOL) tablet 650 mg, 650 mg, Oral, Q4H PRN, 650 mg at 04/04/24 0002 **OR** acetaminophen (TYLENOL) 160 MG/5ML oral solution 650 mg, 650 mg, Oral, Q4H PRN, 650 mg at 03/27/24 2109 **OR** acetaminophen (TYLENOL) suppository 650 mg, 650 mg, Rectal, Q4H PRN, Raymond Mederos MD    [Held by provider] apixaban (ELIQUIS) tablet 5 mg, 5 mg, Oral, Q12H, Raquel Duncan, APRN, 5 mg at 04/05/24 0933    ascorbic acid (VITAMIN C) tablet 1,000 mg, 1,000 mg, Oral, Daily, Raymond Mederos MD, 1,000 mg at 04/08/24 0908    [Held by provider] aspirin EC tablet 81 mg, 81 mg, Oral, Daily, Rene Maloney MD, 81 mg at 04/05/24 0933    sennosides-docusate (PERICOLACE) 8.6-50 MG per tablet 1 tablet, 1 tablet, Oral, BID, 1 tablet at 04/07/24 2050 **AND** polyethylene glycol (MIRALAX) packet 17 g, 17 g, Oral, Daily, 17 g at 04/06/24 0932 **AND** bisacodyl (DULCOLAX) EC tablet 5 mg, 5 mg, Oral, Daily PRN **AND** bisacodyl (DULCOLAX) suppository 10 mg, 10 mg, Rectal, Daily PRN, Raymond Mederos MD    bumetanide (BUMEX) tablet 1 mg, 1 mg, Oral, Daily, Willy Arteaga MD, 1 mg at 04/08/24 0908    carvedilol (COREG) tablet 3.125 mg, 3.125 mg, Oral, BID With Meals, Rene Maloney MD, 3.125 mg at 04/08/24 0908    dextrose (D50W) (25 g/50 mL) IV injection 25 g, 25 g, Intravenous, Q15 " Min PRN, Raymond Mederos MD    dextrose (GLUTOSE) oral gel 15 g, 15 g, Oral, Q15 Min PRN, Raymond Mederos MD, 15 g at 03/27/24 1710    Diclofenac Sodium (VOLTAREN) 1 % gel 2 g, 2 g, Topical, 4x Daily PRN, Raymond Mederos MD    dicyclomine (BENTYL) capsule 20 mg, 20 mg, Oral, TID PRN, Raymond Mederos MD, 20 mg at 04/07/24 2050    donepezil (ARICEPT) tablet 10 mg, 10 mg, Oral, Daily, Raymond Mederos MD, 10 mg at 04/08/24 0911    [Transfer Hold] glucagon (GLUCAGEN) injection 1 mg, 1 mg, Intramuscular, Q15 Min PRN, Raquel Duncan, APRN    hydrocortisone (ANUSOL-HC) suppository 25 mg, 25 mg, Rectal, BID, Raymond Mederos MD, 25 mg at 04/08/24 0912    insulin detemir (LEVEMIR) injection 30 Units, 30 Units, Subcutaneous, Nightly, Raymond Mederos MD, 30 Units at 04/07/24 2135    Insulin Lispro (humaLOG) injection 4-24 Units, 4-24 Units, Subcutaneous, 4x Daily AC & at Bedtime, Raymond Mederos MD, 8 Units at 04/07/24 2136    insulin regular (humuLIN R,novoLIN R) injection 10 Units, 10 Units, Subcutaneous, TID AC, Raymond Mederos MD, 10 Units at 04/08/24 0919    isosorbide mononitrate (IMDUR) 24 hr tablet 30 mg, 30 mg, Oral, Q24H, Raymond Mederos MD, 30 mg at 04/08/24 0908    lactulose solution 20 g, 20 g, Oral, TID, Raymond Mederos MD, 20 g at 04/08/24 0910    linezolid (ZYVOX) tablet 600 mg, 600 mg, Oral, Q12H, Raymond Mederos MD, 600 mg at 04/08/24 0909    magnesium hydroxide (MILK OF MAGNESIA) suspension 10 mL, 10 mL, Oral, Daily PRN, Raymond Mederos MD, 10 mL at 04/03/24 0414    melatonin tablet 6 mg, 6 mg, Oral, Nightly, Raymond Mederos MD, 6 mg at 04/07/24 2050    midodrine (PROAMATINE) tablet 2.5 mg, 2.5 mg, Oral, TID AC, Raymond Mederos MD, 2.5 mg at 04/08/24 1149    mupirocin (BACTROBAN) 2 % nasal ointment 1 Application, 1 Application, Each Nare, BID, Raymond Mederos MD, 1 Application at 04/08/24 0909    nitroglycerin (NITROSTAT) SL tablet 0.4 mg, 0.4 mg, Sublingual, Q5 Min PRN, Raymond Mederos MD    ondansetron  ODT (ZOFRAN-ODT) disintegrating tablet 4 mg, 4 mg, Oral, Q6H PRN, 4 mg at 04/03/24 0537 **OR** ondansetron (ZOFRAN) injection 4 mg, 4 mg, Intravenous, Q6H PRN, Raymond Mederos MD, 4 mg at 03/29/24 1837    oxyCODONE (ROXICODONE) immediate release tablet 10 mg, 10 mg, Oral, BID PRN, Raymond Mederos MD, 10 mg at 04/08/24 0918    pregabalin (LYRICA) capsule 100 mg, 100 mg, Oral, Nightly, Rene Maloney MD, 100 mg at 04/07/24 2050    pregabalin (LYRICA) capsule 50 mg, 50 mg, Oral, Daily, Rene Maloney MD, 50 mg at 04/08/24 0918    rosuvastatin (CRESTOR) tablet 10 mg, 10 mg, Oral, Nightly, Raymond Mederos MD, 10 mg at 04/07/24 2050    sodium chloride 0.9 % flush 10 mL, 10 mL, Intravenous, PRN, Raymond Mederos MD    sodium chloride 0.9 % flush 10 mL, 10 mL, Intravenous, Q12H, Raymond Mederos MD, 10 mL at 04/07/24 2050    sodium chloride 0.9 % flush 10 mL, 10 mL, Intravenous, PRN, Raymond Mederos MD    sodium chloride 0.9 % infusion 40 mL, 40 mL, Intravenous, PRN, Raymond Mederos MD    sucralfate (CARAFATE) 1 GM/10ML suspension 1 g, 1 g, Oral, TID AC, Raymond Mederos MD, 1 g at 04/08/24 1149    tamsulosin (FLOMAX) 24 hr capsule 0.4 mg, 0.4 mg, Oral, Daily, Raymond Mederos MD, 0.4 mg at 04/08/24 0910    timolol (TIMOPTIC) 0.25 % ophthalmic solution 1 drop, 1 drop, Both Eyes, Q12H, Raymond Mederos MD, 1 drop at 04/08/24 0911    zinc sulfate (ZINCATE) capsule 220 mg, 220 mg, Oral, Daily, Raymond Mederos MD, 220 mg at 04/08/24 0909    Review of Systems:   Review of Systems     Vital Signs:  Temp:  [97.4 °F (36.3 °C)-98.2 °F (36.8 °C)] 98 °F (36.7 °C)  Heart Rate:  [67-82] 82  Resp:  [16] 16  BP: ()/(48-64) 99/64  Body mass index is 39.22 kg/m².     Intake/Output Summary (Last 24 hours) at 4/8/2024 1548  Last data filed at 4/8/2024 1100  Gross per 24 hour   Intake 240 ml   Output 500 ml   Net -260 ml     I/O this shift:  In: -   Out: 300 [Urine:300]  Physical Exam:  Physical Exam     Patient appears to be in no  apparent distress.  HEENT: Oral mucosa moist, no icterus noted  Heart: S1, S2  Abdomen: Nondistended, nontender  Results Review:   I have reviewed all of the patient's current test results    Results from last 7 days   Lab Units 04/08/24  1218 04/08/24  0623 04/07/24  2333 04/07/24  1429 04/07/24  0422 04/06/24  1344 04/06/24  0340   WBC 10*3/mm3  --  6.19  --   --  5.70  --  5.43   HEMOGLOBIN g/dL 8.8* 8.7* 8.4*   < > 9.2*   < > 9.5*   HEMATOCRIT % 27.5* 27.0* 24.7*   < > 29.5*   < > 30.2*   PLATELETS 10*3/mm3  --  246  --   --  275  --  294    < > = values in this interval not displayed.       Results from last 7 days   Lab Units 04/08/24  0623 04/07/24  0422 04/06/24  0340 04/05/24  0512   SODIUM mmol/L 141 141 141 139  139   POTASSIUM mmol/L 4.0 4.0 4.5 4.4  4.4   CHLORIDE mmol/L 104 103 104 102  102   CO2 mmol/L 25.0 27.0 28.0 25.0  25.0   BUN mg/dL 30* 33* 33* 31*  31*   CREATININE mg/dL 1.61* 1.75* 2.10* 1.98*  1.98*   CALCIUM mg/dL 9.1 10.1 10.7* 11.1*  11.1*   BILIRUBIN mg/dL 0.4  --   --  0.4   ALK PHOS U/L 107  --   --  138*   ALT (SGPT) U/L 14  --   --  18   AST (SGOT) U/L 16  --   --  22   GLUCOSE mg/dL 82 94 86 173*  173*       Results from last 7 days   Lab Units 04/06/24  0340   INR  1.13*       Lab Results   Lab Value Date/Time    LIPASE 190 08/16/2023 1257    LIPASE 22 07/21/2023 2202    LIPASE 77 (H) 02/24/2023 2017    LIPASE 6 (L) 01/19/2023 1824    LIPASE 12 (L) 02/08/2021 2008    LIPASE 60 01/21/2020 2245    LIPASE 72 (H) 01/17/2020 2312    LIPASE 38 10/27/2019 1656    LIPASE 14 10/12/2019 0321    LIPASE 38 03/05/2019 0544    LIPASE 25 02/08/2019 0631    LIPASE 16 (L) 02/07/2019 1742    LIPASE 38 01/09/2019 1120    LIPASE 42 05/23/2017 0532    LIPASE 76 04/23/2017 2003    LIPASE 197 04/13/2017 1702    LIPASE 60 03/29/2017 0602    LIPASE 115 03/28/2017 0352    LIPASE 603 (H) 03/27/2017 0003    LIPASE 108 12/22/2016 0536    LIPASE 257 (H) 12/21/2016 0256    LIPASE 29 09/19/2016 1802     LIPASE 11 (L) 08/29/2016 1923    LIPASE 264 (H) 06/11/2015 0144    LIPASE 120 02/28/2014 1558    LIPASE 57 02/27/2014 1110       Radiology Review:  Imaging Results (Last 24 Hours)       ** No results found for the last 24 hours. **            Impression/Plan:    Sepsis    Essential hypertension    Type 2 diabetes mellitus with hyperglycemia, with long-term current use of insulin    Anemia due to chronic kidney disease    Diabetic polyneuropathy associated with type 2 diabetes mellitus    BPH without obstruction/lower urinary tract symptoms    Chronic diastolic heart failure    Chronic kidney disease (CKD), stage IV (severe)    Diabetic foot infection    68-year-old male admitted for GI bleed and status post incomplete colonoscopy but with evidence of source of bleeding in the sigmoid colon status post hemoclipping with no further bleeding.  Given the fact that the colonoscopy was incomplete, I have stressed to him about the importance of close follow-up with outpatient GI for a full colonoscopy.  Depending on his hemoglobin, he may benefit from upper endoscopy as well.  Importance of follow-up in outpatient setting stressed and he verbalized understanding.  Further recommendations based on the above.  Repeat an H&H in 1 to 2 weeks.  Follow-up with GI in 2 weeks.      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  Any results not available at time of inpatient discharge and/or GI clinic follow up should be managed by the hospitalist team, PCP, or outpatient gastroenterologist.

## 2024-04-08 NOTE — PROGRESS NOTES
[facility] - PODIATRY    Today's Date: 04/08/24     Patient Name: Erick Luong  MRN: [id number]  CSN: [id number]  PCP: Del Shetty MD  Referring Provider: No ref. provider found  Attending Provider: Rene Maloney MD  Length of Stay: 13    SUBJECTIVE   Chief Complaint: Left foot wounds    HPI: Erick Luong, a 68 y.o.male, who was admitted on 3/26/2024 and podiatry consult was made for left foot wounds.      Patient is up in bedside chair and alert today. He denies any significant overnight events.      Past Medical History:   Diagnosis Date    Arthritis     Autonomic disease     CAD (coronary artery disease) 02/06/2017    Cervical radiculopathy 09/16/2021    Chronic constipation with acute exaccerbation 05/10/2021    Coronary artery disease     Degeneration of cervical intervertebral disc 08/11/2021    Diabetes mellitus     Diabetic foot ulcer 08/31/2020    Diabetic polyneuropathy associated with type 2 diabetes mellitus 01/18/2021    Elevated cholesterol     Gastroesophageal reflux disease 05/13/2019    Headache     HTN (hypertension), benign 05/03/2017    Hyperlipidemia     Hypertension     Mixed hyperlipidemia 02/07/2017    Multiple lung nodules 01/26/2020    5mm, 9 mm RLL identified 1/2020, not present 10/2019.    Myocardial infarction     Osteomyelitis 01/22/2020    Osteomyelitis of fifth toe of right foot 10/07/2019    Pancreatitis     Persistent insomnia 01/20/2020    Renal disorder     Sleep apnea 02/06/2017    Sleep apnea with use of continuous positive airway pressure (CPAP)     NON-COMPLIANT    Slow transit constipation 01/16/2019    Spinal stenosis in cervical region 09/16/2021    Vitamin D deficiency 03/02/2021     Past Surgical History:   Procedure Laterality Date    ABDOMINAL SURGERY      AMPUTATION FOOT / TOE Left 10/2021    5th digit     ANTERIOR CERVICAL DISCECTOMY W/ FUSION N/A 8/5/2022    Procedure: CERVICAL DISCECTOMY ANTERIOR WITH FUSION C5-6 with possible plating  of C5-7 with neuromonitoring and 1 c-arm;  Surgeon: Karel Soliz MD;  Location:  [facility] OR;  Service: Neurosurgery;  Laterality: N/A;    APPENDECTOMY      BACK SURGERY      CARDIAC CATHETERIZATION Left 2021    Procedure: Left Heart Cath w poss intervention left anatomical snuff box acess;  Surgeon: Omkar Charles DO;  Location:  [facility] CATH INVASIVE LOCATION;  Service: Cardiology;  Laterality: Left;    CARDIAC SURGERY      CATARACT EXTRACTION      CERVICAL SPINE SURGERY      COLONOSCOPY N/A 2017    Normal exam repeat in 5 years    COLONOSCOPY N/A 2019    Mild acute inflammation    COLONOSCOPY N/A 2024    Procedure: COLONOSCOPY WITH ANESTHESIA;  Surgeon: aRymond Mederos MD;  Location:  [facility] OR;  Service: Gastroenterology;  Laterality: N/A;  Pre: Anemia, Rectal bleed;  Post: Visible vessel at 20cm, clip x6 placed;  Del Shetty MD    COLONOSCOPY W/ POLYPECTOMY  2014    Hyperplastic polyp    CORONARY ARTERY BYPASS GRAFT  10/2015    ENDOSCOPY  2011    Gastritis with hemorrhage    ENDOSCOPY N/A 2017    Normal exam    ENDOSCOPY N/A 2019    Gastritis    ENDOSCOPY N/A 2020    Non-erosive gastritis with hemorrhage    ENDOSCOPY N/A 02/10/2021    Esophagitis    FOOT SURGERY Left     INCISION AND DRAINAGE OF WOUND Left 2007    spider bite     Family History   Problem Relation Age of Onset    Colon cancer Father     Heart disease Father     Colon cancer Sister     Colon polyps Sister     Alzheimer's disease Mother     Coronary artery disease Sister     Coronary artery disease Sister      Social History     Socioeconomic History    Marital status:    Tobacco Use    Smoking status: Former     Current packs/day: 0.00     Types: Cigarettes     Quit date:      Years since quittin.2    Smokeless tobacco: Never    Tobacco comments:     smoked in [employer]   Vaping Use    Vaping status: Never Used   Substance and Sexual Activity    Alcohol use:  "No    Drug use: No    Sexual activity: Defer     Allergies   Allergen Reactions    Cefepime Hives and Anaphylaxis    Bactrim [Sulfamethoxazole-Trimethoprim] Other (See Comments)     \"RENAL FAILURE\"    Vancomycin Itching    Zolpidem Unknown - High Severity     \"makes him crazy\"    Zolpidem Tartrate Unknown - Low Severity and Provider Review Needed    Metronidazole Rash     Current Facility-Administered Medications   Medication Dose Route Frequency Provider Last Rate Last Admin    acetaminophen (TYLENOL) tablet 650 mg  650 mg Oral Q4H PRN Raymond Mederos MD   650 mg at 04/04/24 0002    Or    acetaminophen (TYLENOL) 160 MG/5ML oral solution 650 mg  650 mg Oral Q4H PRN Raymond Mederos MD   650 mg at 03/27/24 2109    Or    acetaminophen (TYLENOL) suppository 650 mg  650 mg Rectal Q4H PRN Raymond Mederos MD        [Held by provider] apixaban (ELIQUIS) tablet 5 mg  5 mg Oral Q12H Raquel Duncan APRN   5 mg at 04/05/24 0933    ascorbic acid (VITAMIN C) tablet 1,000 mg  1,000 mg Oral Daily Raymond Mederos MD   1,000 mg at 04/08/24 0908    [Held by provider] aspirin EC tablet 81 mg  81 mg Oral Daily Rene Maloney MD   81 mg at 04/05/24 0933    sennosides-docusate (PERICOLACE) 8.6-50 MG per tablet 1 tablet  1 tablet Oral BID Raymond Mederos MD   1 tablet at 04/07/24 2050    And    polyethylene glycol (MIRALAX) packet 17 g  17 g Oral Daily Raymond Mederos MD   17 g at 04/06/24 0932    And    bisacodyl (DULCOLAX) EC tablet 5 mg  5 mg Oral Daily PRN Raymond Mederos MD        And    bisacodyl (DULCOLAX) suppository 10 mg  10 mg Rectal Daily PRN Raymond Mederos MD        bumetanide (BUMEX) tablet 1 mg  1 mg Oral Daily Willy Arteaga MD   1 mg at 04/08/24 0908    carvedilol (COREG) tablet 3.125 mg  3.125 mg Oral BID With Meals Rene Maloney MD   3.125 mg at 04/08/24 0908    dextrose (D50W) (25 g/50 mL) IV injection 25 g  25 g Intravenous Q15 Min PRN Raymond Mederos MD        dextrose (GLUTOSE) oral gel 15 " g  15 g Oral Q15 Min PRN Raymond Mederos MD   15 g at 03/27/24 1710    Diclofenac Sodium (VOLTAREN) 1 % gel 2 g  2 g Topical 4x Daily PRN Raymond Mederos MD        dicyclomine (BENTYL) capsule 20 mg  20 mg Oral TID PRN Raymond Mederos MD   20 mg at 04/07/24 2050    donepezil (ARICEPT) tablet 10 mg  10 mg Oral Daily Raymond Mederos MD   10 mg at 04/08/24 0911    [Transfer Hold] glucagon (GLUCAGEN) injection 1 mg  1 mg Intramuscular Q15 Min PRN Raquel Duncan, APRN        hydrocortisone (ANUSOL-HC) suppository 25 mg  25 mg Rectal BID Raymond Mederos MD   25 mg at 04/08/24 0912    insulin detemir (LEVEMIR) injection 30 Units  30 Units Subcutaneous Nightly Raymond Mederos MD   30 Units at 04/07/24 2135    Insulin Lispro (humaLOG) injection 4-24 Units  4-24 Units Subcutaneous 4x Daily AC & at Bedtime Raymond Mederos MD   8 Units at 04/07/24 2136    insulin regular (humuLIN R,novoLIN R) injection 10 Units  10 Units Subcutaneous TID AC Raymond Mederos MD   10 Units at 04/08/24 0919    isosorbide mononitrate (IMDUR) 24 hr tablet 30 mg  30 mg Oral Q24H Raymond Mederos MD   30 mg at 04/08/24 0908    lactulose solution 20 g  20 g Oral TID Raymond Mederos MD   20 g at 04/08/24 0910    linezolid (ZYVOX) tablet 600 mg  600 mg Oral Q12H Raymond Mederos MD   600 mg at 04/08/24 0909    magnesium hydroxide (MILK OF MAGNESIA) suspension 10 mL  10 mL Oral Daily PRN Raymond Mederos MD   10 mL at 04/03/24 0414    melatonin tablet 6 mg  6 mg Oral Nightly Raymond Mederos MD   6 mg at 04/07/24 2050    midodrine (PROAMATINE) tablet 2.5 mg  2.5 mg Oral TID AC Raymond Mederos MD   2.5 mg at 04/08/24 1149    mupirocin (BACTROBAN) 2 % nasal ointment 1 Application  1 Application Each Nare BID Raymond Mederos MD   1 Application at 04/08/24 0909    nitroglycerin (NITROSTAT) SL tablet 0.4 mg  0.4 mg Sublingual Q5 Min PRN Raymond Mederos MD        ondansetron ODT (ZOFRAN-ODT) disintegrating tablet 4 mg  4 mg Oral Q6H PRN Raymond Mederos MD   4 mg  at 04/03/24 0537    Or    ondansetron (ZOFRAN) injection 4 mg  4 mg Intravenous Q6H PRN Raymond Mederos MD   4 mg at 03/29/24 1837    oxyCODONE (ROXICODONE) immediate release tablet 10 mg  10 mg Oral BID PRN Raymond Mederos MD   10 mg at 04/08/24 0918    pregabalin (LYRICA) capsule 100 mg  100 mg Oral Nightly Rene Maloney MD   100 mg at 04/07/24 2050    pregabalin (LYRICA) capsule 50 mg  50 mg Oral Daily Rene Maloney MD   50 mg at 04/08/24 0918    rosuvastatin (CRESTOR) tablet 10 mg  10 mg Oral Nightly Raymond Mederos MD   10 mg at 04/07/24 2050    sodium chloride 0.9 % flush 10 mL  10 mL Intravenous PRN Raymond Mederos MD        sodium chloride 0.9 % flush 10 mL  10 mL Intravenous Q12H Raymond Mederos MD   10 mL at 04/07/24 2050    sodium chloride 0.9 % flush 10 mL  10 mL Intravenous PRN Raymond Mederos MD        sodium chloride 0.9 % infusion 40 mL  40 mL Intravenous PRN Raymond Mederos MD        sucralfate (CARAFATE) 1 GM/10ML suspension 1 g  1 g Oral TID AC Raymond Meedros MD   1 g at 04/08/24 1149    tamsulosin (FLOMAX) 24 hr capsule 0.4 mg  0.4 mg Oral Daily Raymond Mederos MD   0.4 mg at 04/08/24 0910    timolol (TIMOPTIC) 0.25 % ophthalmic solution 1 drop  1 drop Both Eyes Q12H Raymond Mederos MD   1 drop at 04/08/24 0911    zinc sulfate (ZINCATE) capsule 220 mg  220 mg Oral Daily Raymond Mederos MD   220 mg at 04/08/24 0909     Review of Systems   Constitutional:  Negative for activity change.   HENT:  Negative for congestion.    Respiratory:  Negative for apnea and shortness of breath.    Gastrointestinal:  Negative for abdominal pain.   Musculoskeletal:  Positive for arthralgias. Negative for back pain.   Skin:  Positive for wound.   Neurological:  Positive for numbness.   Psychiatric/Behavioral:  Negative for agitation, behavioral problems and confusion.        OBJECTIVE     Vitals:    04/08/24 1152   BP: 99/64   Pulse: 82   Resp: 16   Temp: 98 °F (36.7 °C)   SpO2: 97%       PHYSICAL  EXAM  GEN:   Pt presents up in bedside chair. Accompanied by none.     Foot/Ankle Exam    GENERAL  Appearance:  appears stated age  Orientation:  AAOx3  Affect:  appropriate    VASCULAR     Right Foot Vascularity   Dorsalis pedis:  2+  Posterior tibial:  2+  Skin temperature:  warm  Edema grading:  Trace  CFT:  3  Varicosities:  none     Left Foot Vascularity   Posterior tibial:  2+  Skin temperature:  warm  Edema grading:  Trace  CFT:  3  Pedal hair growth:  Absent  Varicosities:  none     NEUROLOGIC     Right Foot Neurologic   Light touch sensation: diminished  Vibratory sensation: diminished  Hot/Cold sensation: diminished     Left Foot Neurologic   Light touch sensation: diminished  Vibratory sensation: diminished  Hot/Cold sensation:  diminished    MUSCULOSKELETAL     Right Foot Musculoskeletal   Tenderness:  none       Left Foot Musculoskeletal    Amputation   Toes amputated: fifth toe  Tenderness:  none    MUSCLE STRENGTH     Right Foot Muscle Strength   Foot dorsiflexion:  5  Foot plantar flexion:  5  Foot inversion:  5  Foot eversion:  5     Left Foot Muscle Strength   Foot dorsiflexion:  4+  Foot plantar flexion:  4+  Foot inversion:  4+  Foot eversion:  4+    DERMATOLOGIC      Right Foot Dermatologic   Skin  Positive for wound.      Left Foot Dermatologic   Skin  Positive for wound.      Left foot additional comments: Multiple wounds  See photos    Image:                            RADIOLOGY/NUCLEAR:  NM GI Blood Loss    Result Date: 4/6/2024  Narrative: EXAM: NM GI BLOOD LOSS-  INDICATION: Lower GI bleed (Ped 0-17y)  RADIOPHARMACEUTICAL: Tc-99m labeled red cells 27.6 mCi IV  TECHNICAL: The patient's red blood cells were labeled in vitro and reinjected.  Sequential five-minute anterior planar images of the abdomen were obtained for two hours.  These were also reformatted into cine mode.  COMPARISON: CT abdomen pelvis 7/22/2023  FINDINGS:  No evidence of an active GI bleed.      Impression:  No evidence of  an active GI bleed.  This report was signed and finalized on 4/6/2024 1:31 PM by Ronal Wisdom.      XR Abdomen KUB    Result Date: 4/3/2024  Narrative: EXAMINATION: XR ABDOMEN KUB- 4/3/2024 1:45 PM  HISTORY: abdominal distention; E11.628-Type 2 diabetes mellitus with other skin complications; L08.9-Local infection of the skin and subcutaneous tissue, unspecified; E11.65-Type 2 diabetes mellitus with hyperglycemia; Z74.09-Other reduced mobility.  REPORT: Supine imaging of the abdomen was performed.  COMPARISON: KUB 10/7/2023.  A large amount of stool seen within the transverse colon and flexures, likely representing constipation. No evidence of bowel obstruction is identified. There are no definite urinary tract calculi. Cholecystectomy clips are present. No acute osseous abnormality is identified.      Impression: Extensive constipation within the transverse colon and flexures.  This report was signed and finalized on 4/3/2024 1:46 PM by Dr. Percy Cesar MD.      Adult Transthoracic Echo Complete W/ Cont if Necessary Per Protocol    Result Date: 4/2/2024  Narrative:   The study is technically difficult for diagnosis.  If there is concern for possible infective endocarditis, recommend transesophageal echocardiogram to better visualize the valves.   Left ventricular systolic function is normal. Left ventricular ejection fraction appears to be 61 - 65%.   Left ventricular wall thickness is consistent with mild concentric hypertrophy   Left ventricular diastolic function is consistent with (grade III w/high LAP) fixed restrictive pattern.   Mildly reduced right ventricular systolic function noted.   The left atrial cavity is mildly dilated.   There is mild calcification of the aortic valve. No aortic valve regurgitation is present. No hemodynamically significant aortic valve stenosis is present.     MRI Foot Left With & Without Contrast    Result Date: 3/29/2024  Narrative: EXAMINATION: MRI FOOT LEFT W WO CONTRAST-   3/29/2024 4:30 PM  HISTORY: Foot swelling, diabetic, osteomyelitis suspected, xray done; E11.628-Type 2 diabetes mellitus with other skin complications; L08.9-Local infection of the skin and subcutaneous tissue, unspecified; E11.65-Type 2 diabetes mellitus with hyperglycemia; Z74.09-Other reduced mobility  LEFT foot MRI without and with IV gadolinium contrast. Axial, sagittal, and coronal sequences.  COMPARISON: LEFT foot x-rays from 3/26/2024.  There is a tiny metal foreign body within the plantar soft tissues adjacent to the second toe and despite the small size it produces a prominent amount of artifact related to image distortion.  I see no soft tissue abscess.  No discrete bone edema or bone enhancement is seen to indicate osteomyelitis.      Impression: 1. There are some limitations to the study based on metal related artifact. 2. No soft tissue abscess or definite bone marrow edema or enhancement is seen to indicate osteomyelitis.    This report was signed and finalized on 3/29/2024 6:58 PM by Dr. Gomez Mcgill MD.      XR Foot 3+ View Left    Result Date: 3/26/2024  Narrative: EXAMINATION: XR FOOT 3+ VW LEFT-  3/26/2024 4:19 PM  HISTORY: foot infection  3 view LEFT foot exam.  Previous amputation of the mid/distal fifth metatarsal.  Interval resection or resorption of the fourth metatarsal head.  No bone destruction or soft tissue air is seen.  High-grade degenerative disease at the first metatarsal phalangeal joint.  Unchanged appearance of prominent dorsal midfoot spurring and prominent inferior calcaneal spurring.  Mild soft tissue edema over the dorsum of the foot is less prominent as compared with the previous exam.      Impression: 1. No bone destruction or soft tissue air is seen. 2. Prominent degenerative spurring.    This report was signed and finalized on 3/26/2024 5:25 PM by Dr. Gomez Mcgill MD.      XR Chest 1 View    Result Date: 3/26/2024  Narrative: EXAM: XR CHEST 1 VW-  DATE: 3/26/2024 1:15  PM  HISTORY: ams   COMPARISON: 8/28/2023.  TECHNIQUE:  Frontal view(s) of the chest submitted.  FINDINGS:  Patient is status post median sternotomy and CABG. There is enlargement of the cardiac silhouette. There are low lung volumes with vascular crowding versus mild volume overload. No effusion or pneumothorax is seen.       Impression:  1. Postoperative chest and low lung volumes with vascular crowding versus mild volume overload.  This report was signed and finalized on 3/26/2024 2:34 PM by Eran Christina.      CT Head Without Contrast    Result Date: 3/26/2024  Narrative: EXAM: CT HEAD WO CONTRAST-  DATE: 3/26/2024 1:03 PM  HISTORY: ams   COMPARISON: 10/10/2023.  DOSE LENGTH PRODUCT: 876.8 mGy.cm  Automated exposure control was also utilized to decrease patient radiation dose.  TECHNIQUE: Unenhanced CT images obtained from vertex to skull base with multiplanar reformats.  FINDINGS: There is no acute intracranial hemorrhage, midline shift, mass effect, or hydrocephalus. There is no CT evidence for acute infarct.  There are chronic changes with volume loss and chronic small vessel ischemic change of the periventricular white matter.  SOFT TISSUES: The scalp soft tissues are unremarkable.   SINUS: There is partially imaged mucosal thickening at the right maxillary sinus.  ORBITS: The visualized orbits and globes are unremarkable. There is bilateral lens extraction.       Impression: 1. Chronic changes and no acute intracranial findings.  This report was signed and finalized on 3/26/2024 2:10 PM by Eran Christina.       LABORATORY/CULTURE RESULTS:  Results from last 7 days   Lab Units 04/08/24  1218 04/08/24  0623 04/07/24  2333 04/07/24  1429 04/07/24  0422 04/06/24  1344 04/06/24  0340   WBC 10*3/mm3  --  6.19  --   --  5.70  --  5.43   HEMOGLOBIN g/dL 8.8* 8.7* 8.4*   < > 9.2*   < > 9.5*   HEMATOCRIT % 27.5* 27.0* 24.7*   < > 29.5*   < > 30.2*   PLATELETS 10*3/mm3  --  246  --   --  275  --  294    < > =  values in this interval not displayed.     Results from last 7 days   Lab Units 04/08/24  0623 04/07/24  0422 04/06/24  0340 04/05/24  0512   SODIUM mmol/L 141 141 141 139  139   POTASSIUM mmol/L 4.0 4.0 4.5 4.4  4.4   CHLORIDE mmol/L 104 103 104 102  102   CO2 mmol/L 25.0 27.0 28.0 25.0  25.0   BUN mg/dL 30* 33* 33* 31*  31*   CREATININE mg/dL 1.61* 1.75* 2.10* 1.98*  1.98*   CALCIUM mg/dL 9.1 10.1 10.7* 11.1*  11.1*   BILIRUBIN mg/dL 0.4  --   --  0.4   ALK PHOS U/L 107  --   --  138*   ALT (SGPT) U/L 14  --   --  18   AST (SGOT) U/L 16  --   --  22   GLUCOSE mg/dL 82 94 86 173*  173*     Results from last 7 days   Lab Units 04/06/24  0340   INR  1.13*   APTT seconds 39.2*     Microbiology Results (last 10 days)       Procedure Component Value - Date/Time    Blood Culture With DARSHAN - Blood, Hand, Right [[id number]]  (Normal) Collected: 04/01/24 0402    Lab Status: Final result Specimen: Blood from Hand, Right Updated: 04/06/24 0445     Blood Culture No growth at 5 days    Blood Culture With DARSHAN - Blood, Arm, Left [[id number]]  (Normal) Collected: 03/30/24 0620    Lab Status: Final result Specimen: Blood from Arm, Left Updated: 04/04/24 0645     Blood Culture No growth at 5 days            PATHOLOGY RESULTS:       ASSESSMENT/PLAN   Diabetic foot ulcerations to left foot  Type 2 diabetes mellitus with hyperglycemia  Diabetic polyneuropathy  Sepsis   Metabolic encephalopathy secondary to sepsis-improving    Continue wound care twice daily with Betadine soaked gauze wet-to-dry to be changed by nursing.    To continue antibiotic therapy per infectious disease.    MRI impression shows no soft tissue abscess, no definite bone marrow edema, and no enhancement seen to indicate osteomyelitis at this time.    Patient to follow up [facility]. Gomes [facility] upon [facility].     Will continue to follow patient while admitted.       This document has been electronically signed by SUSAN Perez on April 8, 2024  13:37 CDT

## 2024-04-08 NOTE — THERAPY RE-EVALUATION
Patient Name: Erick Luong  : 1955    MRN: [id number]                              Today's Date: 2024       Admit Date: 3/26/2024    Visit Dx:     ICD-10-CM ICD-9-CM   1. Diabetic foot infection  E11.628 250.80    L08.9 686.9   2. Poorly controlled diabetes mellitus  E11.65 250.00   3. Impaired functional mobility and activity tolerance [Z74.09]  Z74.09 V49.89   4. Rectal bleeding  K62.5 569.3   5. Anemia due to stage 3 chronic kidney disease, unspecified whether stage 3a or 3b CKD  N18.30 285.21    D63.1 585.3     Patient Active Problem List   Diagnosis    Obesity, unspecified obesity severity, unspecified obesity type    Essential hypertension    Type 2 diabetes mellitus with hyperglycemia, with long-term current use of insulin    Nonsmoker    Anemia due to chronic kidney disease    Class 3 severe obesity due to excess calories with body mass index (BMI) of 40.0 to 44.9 in adult    Anasarca    Sleep apnea with use of continuous positive airway pressure (CPAP)    Medically noncompliant    Diabetic ulcer of left midfoot associated with type 2 diabetes mellitus, with fat layer exposed    Diabetic polyneuropathy associated with type 2 diabetes mellitus    Spinal stenosis in cervical region    Degeneration of cervical intervertebral disc    Cervical radiculopathy    Degeneration of lumbar or lumbosacral intervertebral disc    Cervical myelopathy    Bilateral carpal tunnel syndrome    CAD (coronary artery disease)    GERD without esophagitis    BPH without obstruction/lower urinary tract symptoms    Stage 3b chronic kidney disease    Chronic diastolic heart failure    Type 2 myocardial infarction due to heart failure    Left carpal tunnel syndrome    Syncope and collapse, recurrent episodes    Poorly-controlled hypertension    Rhinovirus    Peripheral vascular disease    Chronic kidney disease (CKD), stage IV (severe)    Diabetic foot infection    Sepsis     Past Medical History:   Diagnosis Date     Arthritis     Autonomic disease     CAD (coronary artery disease) 02/06/2017    Cervical radiculopathy 09/16/2021    Chronic constipation with acute exaccerbation 05/10/2021    Coronary artery disease     Degeneration of cervical intervertebral disc 08/11/2021    Diabetes mellitus     Diabetic foot ulcer 08/31/2020    Diabetic polyneuropathy associated with type 2 diabetes mellitus 01/18/2021    Elevated cholesterol     Gastroesophageal reflux disease 05/13/2019    Headache     HTN (hypertension), benign 05/03/2017    Hyperlipidemia     Hypertension     Mixed hyperlipidemia 02/07/2017    Multiple lung nodules 01/26/2020    5mm, 9 mm RLL identified 1/2020, not present 10/2019.    Myocardial infarction     Osteomyelitis 01/22/2020    Osteomyelitis of fifth toe of right foot 10/07/2019    Pancreatitis     Persistent insomnia 01/20/2020    Renal disorder     Sleep apnea 02/06/2017    Sleep apnea with use of continuous positive airway pressure (CPAP)     NON-COMPLIANT    Slow transit constipation 01/16/2019    Spinal stenosis in cervical region 09/16/2021    Vitamin D deficiency 03/02/2021     Past Surgical History:   Procedure Laterality Date    ABDOMINAL SURGERY      AMPUTATION FOOT / TOE Left 10/2021    5th digit     ANTERIOR CERVICAL DISCECTOMY W/ FUSION N/A 8/5/2022    Procedure: CERVICAL DISCECTOMY ANTERIOR WITH FUSION C5-6 with possible plating of C5-7 with neuromonitoring and 1 c-arm;  Surgeon: Karel Soliz MD;  Location:  [facility] OR;  Service: Neurosurgery;  Laterality: N/A;    APPENDECTOMY      BACK SURGERY      CARDIAC CATHETERIZATION Left 02/08/2021    Procedure: Left Heart Cath w poss intervention left anatomical snuff box acess;  Surgeon: Omkar Charles DO;  Location:  [facility]ATH [facility]AS[facility];  Service: Cardiology;  Laterality: Left;    CARDIAC SURGERY      CATARACT EXTRACTION      CERVICAL SPINE SURGERY      COLONOSCOPY N/A 01/31/2017    Normal exam repeat in 5 years    COLONOSCOPY N/A  02/11/2019    Mild acute inflammation    COLONOSCOPY N/A 4/7/2024    Procedure: COLONOSCOPY WITH ANESTHESIA;  Surgeon: Raymond Mederos MD;  Location: [facility];  Service: Gastroenterology;  Laterality: N/A;  Pre: Anemia, Rectal bleed;  Post: Visible vessel at 20cm, clip x6 placed;  Del Shetty MD    COLONOSCOPY W/ POLYPECTOMY  03/04/2014    Hyperplastic polyp    CORONARY ARTERY BYPASS GRAFT  10/2015    ENDOSCOPY  04/13/2011    Gastritis with hemorrhage    ENDOSCOPY N/A 05/05/2017    Normal exam    ENDOSCOPY N/A 02/11/2019    Gastritis    ENDOSCOPY N/A 09/01/2020    Non-erosive gastritis with hemorrhage    ENDOSCOPY N/A 02/10/2021    Esophagitis    FOOT SURGERY Left     INCISION AND DRAINAGE OF WOUND Left 09/2007    spider bite      General Information       Row Name 04/08/24 1549          OT Time and Intention    Document Type re-evaluation  -LR     Mode of Treatment occupational therapy  -LR       Row Name 04/08/24 1549          General Information    Patient Profile Reviewed yes  -LR     Existing Precautions/Restrictions fall  WBAT L LE with CAM boot  -LR       Row Name 04/08/24 1549          Cognition    Orientation Status (Cognition) oriented x 4  -LR       Row Name 04/08/24 1549          Safety Issues, Functional Mobility    Safety Issues Affecting Function (Mobility) ability to follow commands;at risk behavior observed;awareness of need for assistance;friction/shear risk;impulsivity;insight into deficits/self-awareness;judgment;problem-solving;safety precaution awareness;safety precautions follow-through/compliance;sequencing abilities  -LR     Impairments Affecting Function (Mobility) balance;coordination;endurance/activity tolerance;pain;cognition;sensation/sensory awareness;strength;range of motion (ROM)  -LR     Cognitive Impairments, Mobility Safety/Performance attention;awareness, need for assistance;impulsivity;insight into deficits/self-awareness;judgment;problem-solving/reasoning;safety  precaution awareness;safety precaution follow-through;sequencing abilities  -LR               User Key  (r) = Recorded By, (t) = Taken By, (c) = Cosigned By      Initials Name Provider Type    LR Tabitha Webb OTR/L Occupational Therapist                     Mobility/ADL's       Row Name 04/08/24 1549          Bed Mobility    Bed Mobility supine-sit;sit-supine;scooting/bridging  -LR     Scooting/Bridging [place] (Bed Mobility) standby assist;verbal cues  -LR     Supine-Sit [place] (Bed Mobility) standby assist;verbal cues  -LR     Sit-Supine [place] (Bed Mobility) moderate assist (50% patient effort);verbal cues  -LR     Assistive Device (Bed Mobility) bed rails;head of bed elevated  -LR       Row Name 04/08/24 1549          Transfers    Transfers sit-stand transfer;stand-sit transfer;toilet transfer  -       Row Name 04/08/24 1549          Sit-Stand Transfer    Sit-Stand [place] (Transfers) verbal cues;contact guard  -LR     Assistive Device (Sit-Stand Transfers) walker, front-wheeled  -LR       Row Name 04/08/24 1549          Stand-Sit Transfer    Stand-Sit [place] (Transfers) verbal cues;contact guard  -LR     Assistive Device (Stand-Sit Transfers) walker, front-wheeled  -LR       Row Name 04/08/24 1549          Toilet Transfer    Type (Toilet Transfer) sit-stand;stand-sit  -LR     [place] Level (Toilet Transfer) contact guard;verbal cues  -LR     Assistive Device (Toilet Transfer) commode;grab bars/safety frame;walker, front-wheeled  -LR       Row Name 04/08/24 1549          Functional Mobility    Functional Mobility- Ind. Level contact guard assist  -LR     Functional Mobility- Device walker, front-wheeled  -LR       Row Name 04/08/24 1549          Activities of Daily Living    BADL Assessment/Intervention grooming;lower body dressing  -       Row Name 04/08/24 1549          Mobility    Extremity Weight-bearing Status left lower extremity  -LR     Left Lower Extremity  (Weight-bearing Status) weight-bearing as tolerated (WBAT)  with CAM boot  -LR       Row Name 04/08/24 1549          Lower Body Dressing Assessment/Training    [place] Level (Lower Body Dressing) don;socks;maximum assist (25% patient effort);verbal cues  socks and CAM boot  -LR     Position (Lower Body Dressing) edge of bed sitting  -LR       Row Name 04/08/24 1549          Grooming Assessment/Training    [place] Level (Grooming) wash face, hands;standby assist  -LR     Position (Grooming) sink side;supported standing  -LR               User Key  (r) = Recorded By, (t) = Taken By, (c) = Cosigned By      Initials Name Provider Type    LR Tabitha Webb, OTR/L Occupational Therapist                   Obj/Interventions       Row Name 04/08/24 1549          Sensory Assessment (Somatosensory)    Sensory Assessment (Somatosensory) bilateral UE  -LR     Sensory Subjective Reports numbness  -LR     Sensory Assessment pt reports numbness mid-forearm to digits; unable to elaborate  -LR       Row Name 04/08/24 1549          Vision Assessment/Intervention    Visual Impairment/Limitations WFL  -LR       Row Name 04/08/24 1549          Range of Motion Comprehensive    General Range of Motion bilateral upper extremity ROM WFL  -LR     Comment, General Range of Motion B UE AROM WFL, except B proximal 50%  -LR       Row Name 04/08/24 1549          Strength Comprehensive (MMT)    General Manual Muscle Testing (MMT) Assessment upper extremity strength deficits identified  -LR     Comment, General Manual Muscle Testing (MMT) Assessment B UE proximal 4-/5 within available ROM, distal 4+/5  -LR       Row Name 04/08/24 1549          Motor Skills    Motor Skills coordination  -LR     Coordination fine motor deficit;bilateral;upper extremity  -LR       [facility] Name 04/08/24 1549          Balance    Balance Assessment sitting static balance;sitting dynamic balance;standing static balance;standing dynamic balance  -LR     Static  Sitting Balance standby assist  -LR     Dynamic Sitting Balance standby assist  -LR     Position, Sitting Balance unsupported;sitting edge of bed  -LR     Static Standing Balance contact guard;standby assist  -LR     Dynamic Standing Balance contact guard  -LR     Position/Device Used, Standing Balance supported;walker, front-wheeled  -LR               User Key  (r) = Recorded By, (t) = Taken By, (c) = Cosigned By      Initials Name Provider Type    LR Tabitha Webb, OTR/L Occupational Therapist                   Goals/Plan       Row Name 04/08/24 1549          Transfer Goal 1 (OT)    Activity/Assistive Device (Transfer Goal 1, OT) bed-to-chair/chair-to-bed;toilet;tub;commode  -LR     [place] Level/Cues Needed (Transfer Goal 1, OT) standby assist  -LR     Time Frame (Transfer Goal 1, OT) 10 days  -LR     Progress/Outcome (Transfer Goal 1, OT) goal ongoing  -LR       Row Name 04/08/24 1549          Dressing Goal 1 (OT)    Activity/Device (Dressing Goal 1, OT) lower body dressing  -LR     [place]/Cues Needed (Dressing Goal 1, OT) standby assist  -LR     Time Frame (Dressing Goal 1, OT) 10 days  -LR     Progress/Outcome (Dressing Goal 1, OT) goal ongoing  -LR       Row Name 04/08/24 1549          Toileting Goal 1 (OT)    Activity/Device (Toileting Goal 1, OT) toileting skills, all  -LR     [place] Level/Cues Needed (Toileting Goal 1, OT) standby assist  -LR     Time Frame (Toileting Goal 1, OT) long term goal (LTG);by discharge  -LR     Progress/Outcome (Toileting Goal 1, OT) new goal  -LR       Row Name 04/08/24 1549          Grooming Goal 1 (OT)    Activity/Device (Grooming Goal 1, OT) grooming skills, all  -LR     [place] (Grooming Goal 1, OT) standby assist  -LR     Time Frame (Grooming Goal 1, OT) 10 days  -LR     Strategies/Barriers (Grooming Goal 1, OT) standing sink side  -LR     Progress/Outcome (Grooming Goal 1, OT) goal met  -LR       Row Name 04/08/24 2199          Therapy  Assessment/Plan (OT)    Planned Therapy Interventions (OT) activity tolerance training;adaptive equipment training;BADL retraining;cognitive/visual perception retraining;edema control/reduction;functional balance retraining;IADL retraining;neuromuscular control/coordination retraining;occupation/activity based interventions;patient/caregiver education/training;ROM/therapeutic exercise;strengthening exercise;transfer/mobility retraining  -LR               User Key  (r) = Recorded By, (t) = Taken By, (c) = Cosigned By      Initials Name Provider Type    LR Tabitha Webb OTR/L Occupational Therapist                   Clinical Impression       Row Name 04/08/24 1549          Pain Assessment    Pretreatment Pain Rating 8/10  -LR     Posttreatment Pain Rating 8/10  -LR     Pain Location - abdomen;head  -LR     Pre/Posttreatment Pain Comment headache  -LR     Pain Intervention(s) Medication (See MAR);Repositioned;Ambulation/increased activity;Nursing Notified  -LR       Row Name 04/08/24 1549          Plan of Care Review    Plan of Care Reviewed With patient  -LR     Progress no change  -LR     Outcome Evaluation OT reeval completed. Pt presents A&Ox4 with c/o 8/10 abdominal pain prior to activities. Pt willing to participate and came to EOB with SBA. Pt required Max A for donning R sock and L CAM boot. Pt completed sit<>stand t/f's at EOB and commode with CGA required. Pt required CGA for fxl mobility using FWW. Grooming completed standing sink-side with SBA required and cues for proper items. Pt required Mod A for sit>supine. Pt c/o headache following activities, nsg notified. Pt continues to be slow to respond at times with impulsivity noted. Pt has demo progress since SOC with 1 goal met and new goal added this date. Continued skilled OT warranted to provide education and address pain, cognition, balance, activity tolerance, coordination, sensation, ROM, and strength in order to increase pt safety and I during  ADLs, fxl mobility, and fxl t/f's. Recommend rehab placement at D/C pending progress.  -LR       Row Name 04/08/24 1549          Therapy Assessment/Plan (OT)    Patient/Family Therapy Goal Statement (OT) go home  -LR     Rehab Potential (OT) good, to achieve stated therapy goals  -LR     Criteria for Skilled Therapeutic Interventions Met (OT) yes;skilled treatment is necessary  -LR     Therapy Frequency (OT) 5 times/wk  -LR     Predicted Duration of Therapy Intervention (OT) until hospital D/C  -LR       Row Name 04/08/24 1549          Therapy Plan Review/Discharge Plan (OT)    Anticipated Discharge Disposition (OT) sub acute care setting;skilled nursing facility  -LR       Row Name 04/08/24 1549          Vital Signs    O2 Delivery Pre Treatment room air  -LR     O2 Delivery Intra Treatment room air  -LR     O2 Delivery Post Treatment room air  -LR     Pre Patient Position Supine  -LR     Intra Patient Position Standing  -LR     Post Patient Position Supine  -LR       Row Name 04/08/24 1549          Positioning and Restraints    Pre-Treatment Position in bed  -LR     Post Treatment Position bed  -LR     In Bed notified nsg;fowlers;call light within reach;encouraged to call for assist;exit alarm on;side rails up x2;patient within staff view  -LR               User Key  (r) = Recorded By, (t) = Taken By, (c) = Cosigned By      Initials Name Provider Type    LR Tabitha Webb, OTR/L Occupational Therapist                   Outcome Measures       Row Name 04/08/24 1548          How much help from another is currently needed...    Putting on and taking off regular lower body clothing? 2  -LR     Bathing (including washing, rinsing, and drying) 2  -LR     Toileting (which includes using toilet bed pan or urinal) 2  -LR     Putting on and taking off regular upper body clothing 3  -LR     Taking care of personal grooming (such as brushing teeth) 3  -LR     Eating meals 4  -LR     AM-PAC 6 Clicks Score (OT) 16  -LR        Row Name 04/08/24 0918          How much help from another person do you currently need...    Turning from your back to your side while in flat bed without using bedrails? 4  -PB     Moving from lying on back to sitting on the side of a flat bed without bedrails? 4  -PB     Moving to and from a bed to a chair (including a wheelchair)? 3  -PB     Standing up from a chair using your arms (e.g., wheelchair, bedside chair)? 4  -PB     Climbing 3-5 steps with a railing? 2  -PB     To walk in hospital room? 3  -PB     AM-PAC 6 Clicks Score (PT) 20  -PB     Highest Level of Mobility Goal 6 --> Walk 10 steps or more  -PB       Row Name 04/08/24 1549          Functional Assessment    Outcome Measure Options AM-PAC 6 Clicks Daily Activity (OT)  -LR               User Key  (r) = Recorded By, (t) = Taken By, (c) = Cosigned By      Initials Name Provider Type    PB Vicki Braxton RN Registered Nurse    Tabitha Marquez OTR/L Occupational Therapist                    Occupational Therapy Education       Title: PT OT SLP Therapies (In Progress)       Topic: Occupational Therapy (In Progress)       Point: ADL training (Done)       Description:   Instruct learner(s) on proper safety adaptation and remediation techniques during self care or transfers.   Instruct in proper use of assistive devices.                  Learning Progress Summary             Patient Acceptance, E,D, VU,NR by LR at 4/8/2024 1629    Acceptance, E, VU,NR by LS at 4/5/2024 1240    Acceptance, E,D, NR,NL by LR at 4/4/2024 1325    Acceptance, E,D, VU,NR by LR at 4/3/2024 1119    Acceptance, E, VU,NR by LS at 4/2/2024 1128    Acceptance, E, VU,NR by  at 3/29/2024 1424    Comment: Role of OT, OT POC, fall prevention, benefits of ambulation, d/c recommendations                         Point: Home exercise program (Not Started)       Description:   Instruct learner(s) on appropriate technique for monitoring, assisting and/or progressing therapeutic  exercises/activities.                  Learner Progress:  Not documented in this visit.              Point: Precautions (Done)       Description:   Instruct learner(s) on prescribed precautions during self-care and functional transfers.                  Learning Progress Summary             Patient Acceptance, E,D, VU,NR by LR at 4/8/2024 1629    Acceptance, E, VU,NR by LS at 4/5/2024 1240    Acceptance, E,D, NR,NL by LR at 4/4/2024 1325    Acceptance, E,D, VU,NR by LR at 4/3/2024 1119    Acceptance, E, VU,NR by LS at 4/2/2024 1128    Acceptance, E, VU,NR by  at 3/29/2024 1424    Comment: Role of OT, OT POC, fall prevention, benefits of ambulation, d/c recommendations                         Point: Body mechanics (Done)       Description:   Instruct learner(s) on proper positioning and spine alignment during self-care, functional mobility activities and/or exercises.                  Learning Progress Summary             Patient Acceptance, E,D, VU,NR by LR at 4/8/2024 1629    Acceptance, E, VU,NR by LS at 4/5/2024 1240    Acceptance, E,D, NR,NL by LR at 4/4/2024 1325    Acceptance, E,D, VU,NR by LR at 4/3/2024 1119    Acceptance, E, VU,NR by  at 4/2/2024 1128    Acceptance, E, VU,NR by  at 3/29/2024 1424    Comment: Role of OT, OT POC, fall prevention, benefits of ambulation, d/c recommendations                                         User Key       Initials Effective Dates Name Provider Type Discipline     06/20/22 -  Alesha Barrett OTR/L Occupational Therapist OT     04/25/23 -  Tabitha Webb OTR/L Occupational Therapist OT     01/09/24 -  Yulia Pritchett OT Student OT Student OT                  OT Recommendation and Plan  Planned Therapy Interventions (OT): activity tolerance training, adaptive equipment training, BADL retraining, cognitive/visual perception retraining, edema control/reduction, functional balance retraining, IADL retraining, neuromuscular control/coordination retraining,  occupation/activity based interventions, patient/caregiver education/training, ROM/therapeutic exercise, strengthening exercise, transfer/mobility retraining  Therapy Frequency (OT): 5 times/wk  Plan of Care Review  Plan of Care Reviewed With: patient  Progress: no change  Outcome Evaluation: OT reeval completed. Pt presents A&Ox4 with c/o 8/10 abdominal pain prior to activities. Pt willing to participate and came to EOB with SBA. Pt required Max A for donning R sock and L CAM boot. Pt completed sit<>stand t/f's at EOB and commode with CGA required. Pt required CGA for fxl mobility using FWW. Grooming completed standing sink-side with SBA required and cues for proper items. Pt required Mod A for sit>supine. Pt c/o headache following activities, nsg notified. Pt continues to be slow to respond at times with impulsivity noted. Pt has demo progress since SOC with 1 goal met and new goal added this date. Continued skilled OT warranted to provide education and address pain, cognition, balance, activity tolerance, coordination, sensation, ROM, and strength in order to increase pt safety and I during ADLs, fxl mobility, and fxl t/f's. Recommend rehab placement at D/C pending progress.     Time Calculation:         Time Calculation- OT       Row Name 04/08/24 1549 04/08/24 1535          Time Calculation- OT    OT Start Time 1549  -LR --     OT Stop Time 1617  -LR --     OT Time Calculation (min) 28 min  -LR --     OT Received On 04/08/24  -LR --     OT Goal Re-Cert Due Date 04/18/24  -LR --        Timed Charges    46642 - Gait Training Minutes  -- 12  -LY        Untimed Charges    OT Eval/Re-eval Minutes 28  -LR --        Total Minutes    Timed Charges Total Minutes -- 12  -LY     Untimed Charges Total Minutes 28  -LR --      Total Minutes 28  -LR 12  -LY               User Key  (r) = Recorded By, (t) = Taken By, (c) = Cosigned By      Initials Name Provider Type    Alesha Armenta, PTA Physical Therapist Assistant    LR  Tabitha Webb, OTR/L Occupational Therapist                  Therapy Charges for Today       Code Description Service Date Service Provider Modifiers Qty    [id number] HC OT RE-EVAL 2 4/8/2024 Tabitha Webb OTR/L GO 1                 Tabitha Webb OTR/L  4/8/2024

## 2024-04-08 NOTE — PLAN OF CARE
Goal Outcome Evaluation:  Plan of Care Reviewed With: patient        Progress: no change  Outcome Evaluation: OT reeval completed. Pt presents A&Ox4 with c/o 8/10 abdominal pain prior to activities. Pt willing to participate and came to EOB with SBA. Pt required Max A for donning R sock and L CAM boot. Pt completed sit<>stand t/f's at EOB and commode with CGA required. Pt required CGA for fxl mobility using FWW. Grooming completed standing sink-side with SBA required and cues for proper items. Pt required Mod A for sit>supine. Pt c/o headache following activities, nsg notified. Pt continues to be slow to respond at times with impulsivity noted. Pt has demo progress since SOC with 1 goal met and new goal added this date. Continued skilled OT warranted to provide education and address pain, cognition, balance, activity tolerance, coordination, sensation, ROM, and strength in order to increase pt safety and I during ADLs, fxl mobility, and fxl t/f's. Recommend rehab placement at D/C pending progress.      Anticipated Discharge Disposition (OT): sub acute care setting, skilled nursing facility

## 2024-04-08 NOTE — CASE MANAGEMENT/SOCIAL WORK
Continued Stay Note   [place]     Patient Name: Erick Luong  MRN: [id number]  Today's Date: 4/8/2024    Admit Date: 3/26/2024    Plan: Home   Discharge Plan       Row Name 04/08/24 0905       Plan    Plan Home    Plan Comments Spouse plans to take patient home at discharge.  Note, bedside sitter.  Will follow.                   Discharge Codes    No documentation.                 Expected Discharge Date and Time       Expected Discharge Date Expected Discharge Time    Apr 8, 2024               SUZIE Hernandez

## 2024-04-08 NOTE — CONSULTS
Medications and notes reviewed. Pt is currently on all oral medications. No Iv medications ordered. Will reassess as needed but no need for IV access at this time.

## 2024-04-08 NOTE — NURSING NOTE
Patient lost I.V. access. X4 attempts. Will notify VATS team in the a.m. for I.V. placement need.

## 2024-04-08 NOTE — PROGRESS NOTES
[facility] Medicine Services  INPATIENT PROGRESS NOTE    Patient Name: Erick Luong  Date of Admission: 3/26/2024  Today's Date: 04/08/24  Length of Stay: 13  Primary Care Physician: Del Shetty MD    Subjective   Chief Complaint: Altered mental status  Erick Luong is a 68-year-old male with a past medical history of coronary artery disease, diastolic dysfunction followed by Dr. Garay, vascular disease, GERD, diabetes, chronic anemia, chronic nonhealing wound to the left foot followed by [facility] wound care.  Patient had an extended hospitalization 8/2023 due to syncope, subsequent admission to [facility] 9/18 - 10/11, 2023.  During that admission he did undergo debridement of Proteus wound infection.     Patient seen by PCP on 3/11/2024, started on prazosin 1 mg daily, duloxetine 60 mg daily magnesium hydroxide 400 mg daily per office note.  Wife is unaware of exactly what patient is taking.  Will need pharmacy to obtain correct med list.     [facility] who performed debridement on the plantar wound treating with Santyl, is also a area on dorsal aspect of the foot.  Foot is erythemic, warm to touch.  Doctor told patient and wife he was doing well.  There was no signs of infection yesterday.  Today patient was brought to emergency department via EMS for altered mental status with disorientation, glucose was noted to be over 500 in the ambulance.  Initial glucose here 400 followed with 438 treated with regular insulin.  I have taken care of this patient many times.  He awakens for questions.  He is somewhat somnolent.  Remainder workup reveals creatinine 1.9 at baseline, CRP 4.3, lactic acid 3.2 and procalcitonin 6.56.  He does have a white count of 14.6, no bandemia.  Initially afebrile however now 100.7, patient meets criteria for sepsis with tachycardia and tachypnea, elevated lactic acid and source of infection.   There is no organ failure.  Patient is admitted for simple sepsis, condition stabl  3/31   Patient is alert and oriented x 3.  He appears to be in good spirits.  He is requesting for discharge.  He was counseled that we will need to wait until later in this week, after we set up IV antibiotics before we can discharge him.    4/1  Patient was admitted to ER on 3/26 with altered mental status. Had debridement of ongoing wound on left foot the day prior. Left foot warm and erythremic on arrival to ER. Noted to have elevated blood glucose over 400. Initial creatinine of 1.9.  [facility] nephrology were consulted for acute on chronic renal failure MRI of the left foot did not show any osteomyelitis or abscess.  [facility] ID patient was found to have MRSA bacteremia-blood cultures + March 26 and March 29.  Secondary to infected left foot with wounds and associated cellulitis on admission patient was started on Zyvox plan is for echo today rule out endocarditis given persistent bacteremia holding off on PICC line pending that   4/2  As before history of coronary disease diabetes chronic diabetic foot ulcer noncompliance with A1c 10.4 presented to us with mental status change was found to have MRSA sepsis bacteremia secondary to his infected foot MRI did not show any osteo or abscess has been on Zyvox echo was ordered to rule out endocarditis for persistent bacteremia, podiatry recommends wound care and follow-up with outpatient wound care appreciate ID Currently plan for at least 2 weeks of linezolid with start date after clearance of blood cultures-currently March 30. Should be able to transition to oral linezolid given increased bioavailability. Duration may change based on echocardiogram findings , [facility] nephrology acute on chronic renal failure secondary to ATN resolving  4/3  Remains on Zyvox blood cultures remains negative echo of the heart could not rule out endocarditis poor quality recommending YANIV if  endocarditis is a concern we will leave this up to the infectious disease attending appreciate nephrology renal function is getting better, per podiatry continue wound care continue antibiotics MRI is showing no evidence of soft tissue abscess or osteomyelitis podiatry will see as outpatient, patient A1c is 10.8 needs much better control of his diabetes and much better follow-up with his family doctor regarding his diabetes, and is having stomach distention and bloating last bowel movement was 3 days ago will do KUB and start him on lactulose  4/4  As above ATN is resolving creatinine down to 1.78, reassuring extensive constipation to account for his abdominal pain was started on lactulose and will go ahead and start him on enemas, appreciate ID continue Zyvox I agree with ID given the lack of persistent bacteremia no need for YANIV continue wound care repeat blood culture remains unremarkable from March 30, awaiting SNF, patient said that he did not have any more bowel movements we will go ahead and start him on enemas awaiting SNF  4/5  Appreciate nephrology will hold Bumex today kidney function is slightly worse appreciate ID remains on Zyvox for MRSA bacteremia no need for YANIV continue wound care family still wants to take him home with home health and not interested in rehab but culture from March 30 and February 1 remains unremarkable, and has a sitter last night  4/6  Patient apparently had a massive GI bleed reported his aspirin and Eliquis on hold started Protonix do H&H and consulted GI globin is relatively stable and for colonoscopy for tomorrow , Will also obtain a nuclear tagged red blood cell scan.   4/7  Hemoglobin remained stable off aspirin and Eliquis appreciate GI plan for colonoscopy today admitted for MRSA sepsis secondary to diabetic foot ulcer was seen by podiatry ID on board remains on Zyvox Zyvox has been changed to oral will need 1 more week ATN is resolving needs SNF, going for  colonoscopy.    4/8  Patient hemoglobin is stable of aspirin.  Colonoscopy done today was incomplete but did show some bleeding region in the sigmoid colon which was clipped without further bleeding.  Outpatient repeat colonoscopy with proper prep was recommended.    Review of Systems   All pertinent negatives and positives are as above. All other systems have been reviewed and are negative unless otherwise stated.     Objective    Temp:  [97.3 °F (36.3 °C)-98.2 °F (36.8 °C)] 97.3 °F (36.3 °C)  Heart Rate:  [63-82] 63  Resp:  [16] 16  BP: ()/(48-64) 131/53  Physical Exam  Constitutional:       General: He is not in acute distress.     Appearance: He is well-developed. He is not diaphoretic.   HENT:      Head: Normocephalic.   Eyes:      General: No scleral icterus.     Conjunctiva/sclera: Conjunctivae normal.      Pupils: Pupils are equal, round, and reactive to light.   Neck:      Thyroid: No thyromegaly.      Vascular: No JVD.      Trachea: No tracheal deviation.   Cardiovascular:      Rate and Rhythm: Normal rate and regular rhythm.      Heart sounds: Normal heart sounds. No murmur heard.     No friction rub. No gallop.   Pulmonary:      Effort: Pulmonary effort is normal. No respiratory distress.      Breath sounds: Normal breath sounds. No stridor. No wheezing or rales.   Chest:      Chest wall: No tenderness.   Abdominal:      General: Bowel sounds are normal. There is no distension.      Palpations: Abdomen is soft. There is no mass.      Tenderness: There is no abdominal tenderness. There is no guarding or rebound.      Hernia: No hernia is present.   Musculoskeletal:         General: Swelling and signs of injury present. No tenderness or deformity. Normal range of motion.      Cervical back: Normal range of motion and neck supple.      Right lower leg: No edema.      Comments: Dressing over left foot noted.   Lymphadenopathy:      Cervical: No cervical adenopathy.   Skin:     General: Skin is warm and  dry.      Coloration: Skin is not pale.      Findings: No erythema or rash.   Neurological:      General: No focal deficit present.      Mental Status: He is alert and oriented to person, place, and time.      Cranial Nerves: No cranial nerve deficit.      Sensory: No sensory deficit.      Motor: No abnormal muscle tone.      Coordination: Coordination normal.   Psychiatric:         Mood and Affect: Mood normal.         Behavior: Behavior normal.            Results Review:  I have reviewed the labs, radiology results, and diagnostic studies.    Laboratory Data:   Results from last 7 days   Lab Units 04/08/24  1722 04/08/24  1218 04/08/24  0623 04/07/24  1429 04/07/24  0422 04/06/24  1344 04/06/24  0340   WBC 10*3/mm3  --   --  6.19  --  5.70  --  5.43   HEMOGLOBIN g/dL 7.8* 8.8* 8.7*   < > 9.2*   < > 9.5*   HEMATOCRIT % 24.3* 27.5* 27.0*   < > 29.5*   < > 30.2*   PLATELETS 10*3/mm3  --   --  246  --  275  --  294    < > = values in this interval not displayed.        Results from last 7 days   Lab Units 04/08/24  0623 04/07/24  0422 04/06/24  0340 04/05/24  0512   SODIUM mmol/L 141 141 141 139  139   POTASSIUM mmol/L 4.0 4.0 4.5 4.4  4.4   CHLORIDE mmol/L 104 103 104 102  102   CO2 mmol/L 25.0 27.0 28.0 25.0  25.0   BUN mg/dL 30* 33* 33* 31*  31*   CREATININE mg/dL 1.61* 1.75* 2.10* 1.98*  1.98*   CALCIUM mg/dL 9.1 10.1 10.7* 11.1*  11.1*   BILIRUBIN mg/dL 0.4  --   --  0.4   ALK PHOS U/L 107  --   --  138*   ALT (SGPT) U/L 14  --   --  18   AST (SGOT) U/L 16  --   --  22   GLUCOSE mg/dL 82 94 86 173*  173*       Culture Data:   Blood Culture   Date Value Ref Range Status   03/26/2024 Abnormal Stain (C)  Preliminary   03/26/2024 Abnormal Stain (C)  Preliminary       Radiology Data:   Imaging Results (Last 24 Hours)       ** No results found for the last 24 hours. **            I have reviewed the patient's current medications.     Assessment/Plan   Assessment  Active Hospital Problems    Diagnosis      **Sepsis     Diabetic foot infection     Chronic kidney disease (CKD), stage IV (severe)     Chronic diastolic heart failure     BPH without obstruction/lower urinary tract symptoms     Diabetic polyneuropathy associated with type 2 diabetes mellitus     Anemia due to chronic kidney disease     Essential hypertension     Type 2 diabetes mellitus with hyperglycemia, with long-term current use of insulin    MRSA bacteremia  Metabolic encephalopathy secondary to sepsis  WANDER on CKD    Treatment Plan  Continue p.o. Zyvox.  Infectious disease input appreciated.      Nephrology input appreciated.  Creatinine appears to be at baseline.    Gastroenterology input appreciated.  Bleeding area in the sigmoid colon was clipped.  Will discontinue aspirin and Eliquis.  Plan for discharge and repeat colonoscopy with poor prep as outpatient..  Monitor hemoglobin closely every 6 hours.  Transfuse if hemoglobin less than 7G/DL    Podiatry consultation input appreciated.  Will follow recommendations.  Continue wound dressings as per podiatry Follow-up repeat blood cultures.    Nephrology and urology input appreciated.  Hold Bumex due to elevated creatinine and BUN.  No signs of urinary retention at this time.    Pain control with opiate pain medications.    Insulin sliding scale and Levemir for type 2 diabetes mellitus    PT/OT/wound care consultations    DVT prophylaxis with SCDs    CODE STATUS is full code    Medical Decision Making  Number and Complexity of problems: 4 acute, severe, high complexity medical problems.  Multiple chronic medical problems.  Differential Diagnosis: None    Conditions and Status        Condition is worsening.     [facility] Data  External documents reviewed: None  Cardiac tracing (EKG, telemetry) interpretation: None  Radiology interpretation: None  Labs reviewed: CBC, BMP, blood cultures reviewed by me  Any tests that were considered but not ordered: None     Decision rules/scores evaluated (example  [id number], [place], etc): N/A     Discussed with: Patient     Care Planning  Shared decision making: Patient and his wife  Code status and discussions: CODE STATUS is full code    Disposition  Social Determinants of Health that impact treatment or disposition: None  I expect the patient to be discharged to home in 2 to 3 days    Electronically signed by Rene Maloney MD, 04/08/24, 18:38 CDT.

## 2024-04-08 NOTE — PLAN OF CARE
Goal Outcome Evaluation:         Patient A/O x3 this shift. Patient will needs VATs to place new I.V. Patient treated for abdominal cramps/gas per MAR--notified provider per documentation. Will update oncoming dayshift nurse at bedside report in the a.m. as appropriate.

## 2024-04-09 LAB
ANION GAP SERPL CALCULATED.3IONS-SCNC: 11 MMOL/L (ref 5–15)
BASOPHILS # BLD AUTO: 0.03 10*3/MM3 (ref 0–0.2)
BASOPHILS NFR BLD AUTO: 0.7 % (ref 0–1.5)
BUN SERPL-MCNC: 28 MG/DL (ref 8–23)
BUN/CREAT SERPL: 15.2 (ref 7–25)
CALCIUM SPEC-SCNC: 9.4 MG/DL (ref 8.6–10.5)
CHLORIDE SERPL-SCNC: 104 MMOL/L (ref 98–107)
CO2 SERPL-SCNC: 26 MMOL/L (ref 22–29)
CREAT SERPL-MCNC: 1.84 MG/DL (ref 0.76–1.27)
DEPRECATED RDW RBC AUTO: 45.6 FL (ref 37–54)
EGFRCR SERPLBLD CKD-EPI 2021: 39.7 ML/MIN/1.73
EOSINOPHIL # BLD AUTO: 0.18 10*3/MM3 (ref 0–0.4)
EOSINOPHIL NFR BLD AUTO: 4.1 % (ref 0.3–6.2)
ERYTHROCYTE [DISTWIDTH] IN BLOOD BY AUTOMATED COUNT: 13.9 % (ref 12.3–15.4)
GLUCOSE BLDC GLUCOMTR-MCNC: 146 MG/DL (ref 70–130)
GLUCOSE BLDC GLUCOMTR-MCNC: 155 MG/DL (ref 70–130)
GLUCOSE BLDC GLUCOMTR-MCNC: 159 MG/DL (ref 70–130)
GLUCOSE BLDC GLUCOMTR-MCNC: 76 MG/DL (ref 70–130)
GLUCOSE SERPL-MCNC: 170 MG/DL (ref 65–99)
HCT VFR BLD AUTO: 26.5 % (ref 37.5–51)
HCT VFR BLD AUTO: 29 % (ref 37.5–51)
HGB BLD-MCNC: 8.4 G/DL (ref 13–17.7)
HGB BLD-MCNC: 8.4 G/DL (ref 13–17.7)
HGB BLD-MCNC: 8.7 G/DL (ref 13–17.7)
HGB BLD-MCNC: 9.2 G/DL (ref 13–17.7)
IMM GRANULOCYTES # BLD AUTO: 0.01 10*3/MM3 (ref 0–0.05)
IMM GRANULOCYTES NFR BLD AUTO: 0.2 % (ref 0–0.5)
LYMPHOCYTES # BLD AUTO: 1.74 10*3/MM3 (ref 0.7–3.1)
LYMPHOCYTES NFR BLD AUTO: 40 % (ref 19.6–45.3)
MCH RBC QN AUTO: 28.2 PG (ref 26.6–33)
MCHC RBC AUTO-ENTMCNC: 31.7 G/DL (ref 31.5–35.7)
MCV RBC AUTO: 89 FL (ref 79–97)
MONOCYTES # BLD AUTO: 0.43 10*3/MM3 (ref 0.1–0.9)
MONOCYTES NFR BLD AUTO: 9.9 % (ref 5–12)
NEUTROPHILS NFR BLD AUTO: 1.96 10*3/MM3 (ref 1.7–7)
NEUTROPHILS NFR BLD AUTO: 45.1 % (ref 42.7–76)
NRBC BLD AUTO-RTO: 0 /100 WBC (ref 0–0.2)
PLATELET # BLD AUTO: 231 10*3/MM3 (ref 140–450)
PMV BLD AUTO: 9.9 FL (ref 6–12)
POTASSIUM SERPL-SCNC: 4.1 MMOL/L (ref 3.5–5.2)
RBC # BLD AUTO: 3.26 10*6/MM3 (ref 4.14–5.8)
SODIUM SERPL-SCNC: 141 MMOL/L (ref 136–145)
WBC NRBC COR # BLD AUTO: 4.35 10*3/MM3 (ref 3.4–10.8)

## 2024-04-09 PROCEDURE — 85018 HEMOGLOBIN: CPT | Performed by: HOSPITALIST

## 2024-04-09 PROCEDURE — 82948 REAGENT STRIP/BLOOD GLUCOSE: CPT

## 2024-04-09 PROCEDURE — 99232 SBSQ HOSP IP/OBS MODERATE 35: CPT | Performed by: NURSE PRACTITIONER

## 2024-04-09 PROCEDURE — 85014 HEMATOCRIT: CPT | Performed by: HOSPITALIST

## 2024-04-09 PROCEDURE — 63710000001 INSULIN LISPRO (HUMAN) PER 5 UNITS: Performed by: INTERNAL MEDICINE

## 2024-04-09 PROCEDURE — 63710000001 INSULIN REGULAR HUMAN PER 5 UNITS: Performed by: INTERNAL MEDICINE

## 2024-04-09 PROCEDURE — 63710000001 INSULIN DETEMIR PER 5 UNITS: Performed by: INTERNAL MEDICINE

## 2024-04-09 PROCEDURE — 85025 COMPLETE CBC W/AUTO DIFF WBC: CPT | Performed by: INTERNAL MEDICINE

## 2024-04-09 PROCEDURE — 80048 BASIC METABOLIC PNL TOTAL CA: CPT | Performed by: INTERNAL MEDICINE

## 2024-04-09 PROCEDURE — 99231 SBSQ HOSP IP/OBS SF/LOW 25: CPT | Performed by: INTERNAL MEDICINE

## 2024-04-09 PROCEDURE — 97535 SELF CARE MNGMENT TRAINING: CPT

## 2024-04-09 PROCEDURE — 97530 THERAPEUTIC ACTIVITIES: CPT

## 2024-04-09 PROCEDURE — 97164 PT RE-EVAL EST PLAN CARE: CPT

## 2024-04-09 PROCEDURE — 97110 THERAPEUTIC EXERCISES: CPT

## 2024-04-09 PROCEDURE — 97116 GAIT TRAINING THERAPY: CPT

## 2024-04-09 RX ORDER — PANTOPRAZOLE SODIUM 40 MG/1
40 TABLET, DELAYED RELEASE ORAL
Status: DISCONTINUED | OUTPATIENT
Start: 2024-04-09 | End: 2024-04-11 | Stop reason: HOSPADM

## 2024-04-09 RX ADMIN — PANTOPRAZOLE SODIUM 40 MG: 40 TABLET, DELAYED RELEASE ORAL at 17:32

## 2024-04-09 RX ADMIN — MIDODRINE HYDROCHLORIDE 2.5 MG: 2.5 TABLET ORAL at 12:26

## 2024-04-09 RX ADMIN — ZINC SULFATE 220 MG (50 MG) CAPSULE 220 MG: CAPSULE at 08:27

## 2024-04-09 RX ADMIN — INSULIN LISPRO 4 UNITS: 100 INJECTION, SOLUTION INTRAVENOUS; SUBCUTANEOUS at 17:32

## 2024-04-09 RX ADMIN — MIDODRINE HYDROCHLORIDE 2.5 MG: 2.5 TABLET ORAL at 08:27

## 2024-04-09 RX ADMIN — OXYCODONE HYDROCHLORIDE 10 MG: 5 TABLET ORAL at 10:39

## 2024-04-09 RX ADMIN — SUCRALFATE 1 G: 1 SUSPENSION ORAL at 17:32

## 2024-04-09 RX ADMIN — PREGABALIN 100 MG: 100 CAPSULE ORAL at 21:06

## 2024-04-09 RX ADMIN — LACTULOSE 20 G: 20 SOLUTION ORAL at 21:07

## 2024-04-09 RX ADMIN — LINEZOLID 600 MG: 600 TABLET, FILM COATED ORAL at 21:06

## 2024-04-09 RX ADMIN — MIDODRINE HYDROCHLORIDE 2.5 MG: 2.5 TABLET ORAL at 17:32

## 2024-04-09 RX ADMIN — BUMETANIDE 2 MG: 1 TABLET ORAL at 08:27

## 2024-04-09 RX ADMIN — INSULIN HUMAN 10 UNITS: 100 INJECTION, SOLUTION PARENTERAL at 17:32

## 2024-04-09 RX ADMIN — HYDROCORTISONE ACETATE 25 MG: 25 SUPPOSITORY RECTAL at 08:28

## 2024-04-09 RX ADMIN — ISOSORBIDE MONONITRATE 30 MG: 30 TABLET, EXTENDED RELEASE ORAL at 08:28

## 2024-04-09 RX ADMIN — Medication 6 MG: at 21:07

## 2024-04-09 RX ADMIN — Medication 1 APPLICATION: at 08:28

## 2024-04-09 RX ADMIN — SUCRALFATE 1 G: 1 SUSPENSION ORAL at 08:28

## 2024-04-09 RX ADMIN — DONEPEZIL HYDROCHLORIDE 10 MG: 10 TABLET, FILM COATED ORAL at 08:28

## 2024-04-09 RX ADMIN — INSULIN HUMAN 10 UNITS: 100 INJECTION, SOLUTION PARENTERAL at 08:34

## 2024-04-09 RX ADMIN — LACTULOSE 20 G: 20 SOLUTION ORAL at 17:32

## 2024-04-09 RX ADMIN — TIMOLOL MALEATE 1 DROP: 2.5 SOLUTION/ DROPS OPHTHALMIC at 08:29

## 2024-04-09 RX ADMIN — PREGABALIN 50 MG: 50 CAPSULE ORAL at 08:34

## 2024-04-09 RX ADMIN — LACTULOSE 20 G: 20 SOLUTION ORAL at 08:28

## 2024-04-09 RX ADMIN — OXYCODONE HYDROCHLORIDE AND ACETAMINOPHEN 1000 MG: 500 TABLET ORAL at 08:27

## 2024-04-09 RX ADMIN — TIMOLOL MALEATE 1 DROP: 2.5 SOLUTION/ DROPS OPHTHALMIC at 21:07

## 2024-04-09 RX ADMIN — PANTOPRAZOLE SODIUM 40 MG: 40 TABLET, DELAYED RELEASE ORAL at 08:34

## 2024-04-09 RX ADMIN — SUCRALFATE 1 G: 1 SUSPENSION ORAL at 12:26

## 2024-04-09 RX ADMIN — OXYCODONE HYDROCHLORIDE 10 MG: 5 TABLET ORAL at 22:05

## 2024-04-09 RX ADMIN — ROSUVASTATIN CALCIUM 10 MG: 10 TABLET, FILM COATED ORAL at 21:07

## 2024-04-09 RX ADMIN — Medication 1 APPLICATION: at 21:07

## 2024-04-09 RX ADMIN — LINEZOLID 600 MG: 600 TABLET, FILM COATED ORAL at 08:28

## 2024-04-09 RX ADMIN — CARVEDILOL 3.12 MG: 3.12 TABLET, FILM COATED ORAL at 08:27

## 2024-04-09 RX ADMIN — INSULIN LISPRO 4 UNITS: 100 INJECTION, SOLUTION INTRAVENOUS; SUBCUTANEOUS at 08:34

## 2024-04-09 RX ADMIN — INSULIN DETEMIR 15 UNITS: 100 INJECTION, SOLUTION SUBCUTANEOUS at 21:17

## 2024-04-09 RX ADMIN — DOCUSATE SODIUM AND SENNOSIDES 1 TABLET: 8.6; 5 TABLET, FILM COATED ORAL at 21:07

## 2024-04-09 RX ADMIN — CARVEDILOL 3.12 MG: 3.12 TABLET, FILM COATED ORAL at 17:33

## 2024-04-09 RX ADMIN — TAMSULOSIN HYDROCHLORIDE 0.4 MG: 0.4 CAPSULE ORAL at 08:27

## 2024-04-09 NOTE — THERAPY RE-EVALUATION
Patient Name: Erick Luong  : 1955    MRN: [id number]                              Today's Date: 2024       Admit Date: 3/26/2024    Visit Dx:     ICD-10-CM ICD-9-CM   1. Diabetic foot infection  E11.628 250.80    L08.9 686.9   2. Poorly controlled diabetes mellitus  E11.65 250.00   3. Impaired functional mobility and activity tolerance [Z74.09]  Z74.09 V49.89   4. Rectal bleeding  K62.5 569.3   5. Anemia due to stage 3 chronic kidney disease, unspecified whether stage 3a or 3b CKD  N18.30 285.21    D63.1 585.3     Patient Active Problem List   Diagnosis    Obesity, unspecified obesity severity, unspecified obesity type    Essential hypertension    Type 2 diabetes mellitus with hyperglycemia, with long-term current use of insulin    Nonsmoker    Anemia due to chronic kidney disease    Class 3 severe obesity due to excess calories with body mass index (BMI) of 40.0 to 44.9 in adult    Anasarca    Sleep apnea with use of continuous positive airway pressure (CPAP)    Medically noncompliant    Diabetic ulcer of left midfoot associated with type 2 diabetes mellitus, with fat layer exposed    Diabetic polyneuropathy associated with type 2 diabetes mellitus    Spinal stenosis in cervical region    Degeneration of cervical intervertebral disc    Cervical radiculopathy    Degeneration of lumbar or lumbosacral intervertebral disc    Cervical myelopathy    Bilateral carpal tunnel syndrome    CAD (coronary artery disease)    GERD without esophagitis    BPH without obstruction/lower urinary tract symptoms    Stage 3b chronic kidney disease    Chronic diastolic heart failure    Type 2 myocardial infarction due to heart failure    Left carpal tunnel syndrome    Syncope and collapse, recurrent episodes    Poorly-controlled hypertension    Rhinovirus    Peripheral vascular disease    Chronic kidney disease (CKD), stage IV (severe)    Diabetic foot infection    Sepsis     Past Medical History:   Diagnosis Date     Arthritis     Autonomic disease     CAD (coronary artery disease) 02/06/2017    Cervical radiculopathy 09/16/2021    Chronic constipation with acute exaccerbation 05/10/2021    Coronary artery disease     Degeneration of cervical intervertebral disc 08/11/2021    Diabetes mellitus     Diabetic foot ulcer 08/31/2020    Diabetic polyneuropathy associated with type 2 diabetes mellitus 01/18/2021    Elevated cholesterol     Gastroesophageal reflux disease 05/13/2019    Headache     HTN (hypertension), benign 05/03/2017    Hyperlipidemia     Hypertension     Mixed hyperlipidemia 02/07/2017    Multiple lung nodules 01/26/2020    5mm, 9 mm RLL identified 1/2020, not present 10/2019.    Myocardial infarction     Osteomyelitis 01/22/2020    Osteomyelitis of fifth toe of right foot 10/07/2019    Pancreatitis     Persistent insomnia 01/20/2020    Renal disorder     Sleep apnea 02/06/2017    Sleep apnea with use of continuous positive airway pressure (CPAP)     NON-COMPLIANT    Slow transit constipation 01/16/2019    Spinal stenosis in cervical region 09/16/2021    Vitamin D deficiency 03/02/2021     Past Surgical History:   Procedure Laterality Date    ABDOMINAL SURGERY      AMPUTATION FOOT / TOE Left 10/2021    5th digit     ANTERIOR CERVICAL DISCECTOMY W/ FUSION N/A 8/5/2022    Procedure: CERVICAL DISCECTOMY ANTERIOR WITH FUSION C5-6 with possible plating of C5-7 with neuromonitoring and 1 c-arm;  Surgeon: Karel Soliz MD;  Location:  [facility] OR;  Service: Neurosurgery;  Laterality: N/A;    APPENDECTOMY      BACK SURGERY      CARDIAC CATHETERIZATION Left 02/08/2021    Procedure: Left Heart Cath w poss intervention left anatomical snuff box acess;  Surgeon: Omkar Charles DO;  Location:  [facility] CATH INVASIVE LOCATION;  Service: Cardiology;  Laterality: Left;    CARDIAC SURGERY      CATARACT EXTRACTION      CERVICAL SPINE SURGERY      COLONOSCOPY N/A 01/31/2017    Normal exam repeat in 5 years    COLONOSCOPY N/A  02/11/2019    Mild acute inflammation    COLONOSCOPY N/A 4/7/2024    Procedure: COLONOSCOPY WITH ANESTHESIA;  Surgeon: Raymond Mederos MD;  Location: [facility] OR;  Service: Gastroenterology;  Laterality: N/A;  Pre: Anemia, Rectal bleed;  Post: Visible vessel at 20cm, clip x6 placed;  Del Shetty MD    COLONOSCOPY W/ POLYPECTOMY  03/04/2014    Hyperplastic polyp    CORONARY ARTERY BYPASS GRAFT  10/2015    ENDOSCOPY  04/13/2011    Gastritis with hemorrhage    ENDOSCOPY N/A 05/05/2017    Normal exam    ENDOSCOPY N/A 02/11/2019    Gastritis    ENDOSCOPY N/A 09/01/2020    Non-erosive gastritis with hemorrhage    ENDOSCOPY N/A 02/10/2021    Esophagitis    FOOT SURGERY Left     INCISION AND DRAINAGE OF WOUND Left 09/2007    spider bite      General Information       Row Name 04/09/24 0815          Physical Therapy Time and Intention    Document Type re-evaluation  Diagnosis: diabetic foot infection  -JE (r) SW (t) JE (c)     Mode of Treatment physical therapy  -JE (r) SW (t) JE (c)       Row Name 04/09/24 0815          General Information    Patient Profile Reviewed yes  -JE (r) SW (t) JE (c)     Existing Precautions/Restrictions fall  WBAT LLE cam boot  -JE (r) SW (t) JE (c)     Barriers to Rehab previous functional deficit;impaired sensation;medically complex  -JE (r) SW (t) JE (c)       Row Name 04/09/24 0815          Cognition    Orientation Status (Cognition) oriented x 4  -JE (r) SW (t) JE (c)       Row Name 04/09/24 0815          Safety Issues, Functional Mobility    Safety Issues Affecting Function (Mobility) at risk behavior observed;friction/shear risk;insight into deficits/self-awareness;safety precaution awareness;safety precautions follow-through/compliance  -JE (r) SW (t) JE (c)     Impairments Affecting Function (Mobility) balance;endurance/activity tolerance;pain;sensation/sensory awareness;strength  -JE (r) SW (t) JE (c)     Cognitive Impairments, Mobility Safety/Performance awareness, need for  assistance;insight into deficits/self-awareness;judgment;problem-solving/reasoning;safety precaution awareness;safety precaution follow-through  -JE (r) SW (t) JE (c)               User Key  (r) = Recorded By, (t) = Taken By, (c) = Cosigned By      Initials Name Provider Type    Melly Brown, PT Physical Therapist    Kevin Ríos, PT Student PT Student                   Mobility       Row Name 04/09/24 0919 04/09/24 0815       Sit-Stand Transfer    Sit-Stand [place] (Transfers) --  -JE (r) SW (t) JE (c) verbal cues;contact guard;other (see comments)  Stand to sit: pt needs verbal cues to line backs of legs up with chair and grab both arm rests when sitting.  -JE (r) SW (t) JE (c)    Assistive Device (Sit-Stand Transfers) --  -JE (r) SW (t) JE (c) walker, front-wheeled  -JE (r) SW (t) JE (c)      Row Name 04/09/24 0919 04/09/24 0815       Gait/Stairs (Locomotion)    [place] Level (Gait) --  -JE (r) SW (t) JE (c) verbal cues;contact guard  -JE (r) SW (t) JE (c)    Assistive Device (Gait) --  -JE (r) SW (t) JE (c) walker, front-wheeled  -JE (r) SW (t) JE (c)    Patient was able to Ambulate --  -JE (r) SW (t) JE (c) yes  -JE (r) SW (t) JE (c)    Distance in Feet (Gait) --  -JE (r) SW (t) JE (c) 100  -JE (r) SW (t) JE (c)    Bilateral Gait Deviations --  -JE (r) SW (t) JE (c) forward flexed posture;heel strike decreased  -JE (r) SW (t) JE (c)    Comment, (Gait/Stairs) --  -JE (r) SW (t) JE (c) Cam boot donned in chair to LLE, able to WBAT per Dr. Cerrato  -JE (r) SW (t) JE (c)      Row Name 04/09/24 0919 04/09/24 0815       Mobility    Extremity Weight-bearing Status --  -JE (r) SW (t) JE (c) left lower extremity  -JE (r) SW (t) JE (c)    Left Lower Extremity (Weight-bearing Status) --  -JE (r) SW (t) JE (c) weight-bearing as tolerated (WBAT);other (see comments)  LLE cam boot  -JE (r) SW (t) JE (c)              User Key  (r) = Recorded By, (t) = Taken By, (c) = Cosigned By      Initials Name  Provider Type    Melly Brown, PT Physical Therapist    Kevin Ríos, PT Student PT Student                   Obj/Interventions       Row Name 04/09/24 0815          Range of Motion Comprehensive    General Range of Motion bilateral lower extremity ROM WFL  -JE (r) SW (t) JE (c)       Row Name 04/09/24 0815          Strength Comprehensive (MMT)    General Manual Muscle Testing (MMT) Assessment lower extremity strength deficits identified  -JE (r) SW (t) JE (c)     Comment, General Manual Muscle Testing (MMT) Assessment L ankle not assessed however able to move against gravity actively, R ankle df: 5/5, B knee ext: 5/5, B knee flex: 5/5, B hip flex: 4/5  -JE (r) SW (t) JE (c)       Row Name 04/09/24 0815          Balance    Balance Assessment sitting static balance;sitting dynamic balance;sit to stand dynamic balance;standing static balance;standing dynamic balance  -JE (r) SW (t) JE (c)     Static Sitting Balance standby assist  -JE (r) SW (t) JE (c)     Dynamic Sitting Balance standby assist  -JE (r) SW (t) JE (c)     Position, Sitting Balance supported;sitting in chair  -JE (r) SW (t) JE (c)     Sit to Stand Dynamic Balance contact guard  -JE (r) SW (t) JE (c)     Static Standing Balance contact guard  -JE (r) SW (t) JE (c)     Dynamic Standing Balance contact guard  -JE (r) SW (t) JE (c)     Position/Device Used, Standing Balance supported;walker, front-wheeled  -JE (r) SW (t) JE (c)       Row Name 04/09/24 0815          Sensory Assessment (Somatosensory)    Sensory Assessment (Somatosensory) other (see comments)  pt light touch sensation absent in bilateral big toes, L medial and lateral lower leg; light touch sensation present in RLE from the ankle up, and LLE from the knee up  -JE (r) SW (t) JE (c)               User Key  (r) = Recorded By, (t) = Taken By, (c) = Cosigned By      Initials Name Provider Type    Melly Brown, PT Physical Therapist    Kevin Ríos, PT Student PT Student                    Goals/Plan       Row Name 04/09/24 0815          Bed Mobility Goal 1 (PT)    Activity/Assistive Device (Bed Mobility Goal 1, PT) sit to supine/supine to sit;sit to sidelying  -JE (r) SW (t) JE (c)     [place] Level/Cues Needed (Bed Mobility Goal 1, PT) contact guard required;standby assist  -JE (r) SW (t) JE (c)     Time Frame (Bed Mobility Goal 1, PT) long term goal (LTG);by discharge  -JE (r) SW (t) JE (c)     Strategies/Barriers (Bed Mobility Goal 1, PT) w/ bedrails  -JE (r) SW (t) JE (c)     Progress/Outcomes (Bed Mobility Goal 1, PT) goal revised this date  -JE (r) SW (t) JE (c)       Row Name 04/09/24 0815          Transfer Goal 1 (PT)    Activity/Assistive Device (Transfer Goal 1, PT) bed-to-chair/chair-to-bed;other (see comments)  WBAT LLE cam boot  -JE (r) SW (t) JE (c)     [place] Level/Cues Needed (Transfer Goal 1, PT) standby assist  -JE (r) SW (t) JE (c)     Time Frame (Transfer Goal 1, PT) long term goal (LTG);10 days  -JE (r) SW (t) JE (c)     Progress/Outcome (Transfer Goal 1, PT) goal revised this date  -JE (r) SW (t) JE (c)       Row Name 04/09/24 0815          Gait Training Goal 1 (PT)    Activity/Assistive Device (Gait Training Goal 1, PT) gait (walking locomotion);assistive device use;diminish gait deviation;decrease fall risk;improve balance and speed;increase endurance/gait distance;walker, rolling;decrease asymmetrical patterns;other (see comments)  Lacked heel strike and decreased foot clearance; cam boot  -JE (r) SW (t) JE (c)     [place] Level (Gait Training Goal 1, PT) contact guard required  -JE (r) SW (t) JE (c)     Distance (Gait Training Goal 1, PT) 100 feet with improved gait mechanics and CAM walking boot in place  -JE (r) SW (t) JE (c)     Time Frame (Gait Training Goal 1, PT) long term goal (LTG);by discharge  -NIKO (r) YIMI (t) NIKO (c)     Strategies/Barriers (Gait Training Goal 1, PT) clarification made by APRN for clearance to WBAT to walk with CAM  boot  -JE (r) SW (t) JE (c)     Progress/Outcome (Gait Training Goal 1, PT) goal revised this date  -JE (r) SW (t) JE (c)       Row Name 04/09/24 0815          Stairs Goal 1 (PT)    Activity/Assistive Device (Stairs Goal 1, PT) ascending stairs;descending stairs;maintain weight-bearing status;step-over step;step-to-step;decrease fall risk;improve balance and speed;assistive device use;walker, rolling  -JE (r) SW (t) JE (c)     [place] Level/Cues Needed (Stairs Goal 1, PT) contact guard required  -JE (r) SW (t) JE (c)     Number of Stairs (Stairs Goal 1, PT) 2  -JE (r) SW (t) JE (c)     Time Frame (Stairs Goal 1, PT) long term goal (LTG);by discharge  -JE (r) SW (t) JE (c)     Strategies/Barriers (Stairs Goal 1, PT) WBAT LLE w/ cam boot  -JE (r) SW (t) JE (c)     Progress/Outcome (Stairs Goal 1, PT) new goal  -JE (r) SW (t) JE (c)       Row Name 04/09/24 0815          Problem Specific Goal 1 (PT)    Problem Specific Goal 1 (PT) Pt will score overall 5/5 BLE strength  -JE (r) SW (t) JE (c)     Time Frame (Problem Specific Goal 1, PT) long-term goal (LTG);by discharge  -JE (r) SW (t) JE (c)     Progress/Outcome (Problem Specific Goal 1, PT) new goal  -JE (r) SW (t) JE (c)       Row Name 04/09/24 0815          Patient Education Goal (PT)    Activity (Patient Education Goal, PT) w/c mobility and mechanics on level surface functional distances w/ L LE maintained NWB  -JE (r) SW (t) JE (c)     [place]/Cues/Accuracy (Memory Goal 2, PT) demonstrates adequately;independent;verbalizes understanding  -JE (r) SW (t) JE (c)     Time Frame (Patient Education Goal, PT) long term goal (LTG);10 days  -JE (r) SW (t) JE (c)     Progress/Outcome (Patient Education Goal, PT) goal no longer appropriate  -NIKO (r) YIMI (t) NIKO (c)       Row Name 04/09/24 0803          Therapy Assessment/Plan (PT)    Planned Therapy Interventions (PT) balance training;bed mobility training;gait training;home exercise program;patient/family  education;postural re-education;strengthening;stair training;transfer training  -JE (r) SW (t) JE (c)               User Key  (r) = Recorded By, (t) = Taken By, (c) = Cosigned By      Initials Name Provider Type    Melly Brown, PT Physical Therapist    Kevin Ríos, PT Student PT Student                   Clinical Impression       Row Name 04/09/24 0815          Pain    Pretreatment Pain Rating 8/10  Reports numbness and tingling in hands and toes, nausea  -JE (r) SW (t) JE (c)     Posttreatment Pain Rating 8/10  -JE (r) SW (t) JE (c)     Pain Location - abdomen  -JE (r) SW (t) JE (c)     Pain Intervention(s) Medication (See MAR);Repositioned;Ambulation/increased activity  -JE (r) SW (t) JE (c)     Additional Documentation Pain Scale: Numbers Pre/Post-Treatment (Group)  -JE (r) SW (t) JE (c)       Row Name 04/09/24 0815          Plan of Care Review    Plan of Care Reviewed With patient  -JE (r) SW (t) JE (c)     Progress improving  -JE (r) SW (t) JE (c)     Outcome Evaluation PT re-eval complete. Pt seated in recliner with legs elevated, left foot bandaged/wrapped, and chair alarm. A&Ox4. Pt reports feeling pain in his stomach, nausea, and numbness and tingling in his hands and feet. Pt has BLE ROM WFL and decreased hip flexor strength. Pt maxA don/doff cam boot. Pt was able to demonstrate sit to/from stand with CGA, cues, Cam boot, and Rwx; Stand to sit was performed three times to practice safety awareness technique for using both arms to grab ahold of chair; Pt could ambulate a distance of 100 feet with cam boot, CGA, and Rwx; Pt's gait pattern had decreased stride length on right side, decreased ambulation speed, and forward trunk lean. Pt is showing signs of improvement and would continue to benefit from PT. Recommend d/c sub-acute or SNF however patient intends to return home with his spouse.  -JE (r) SW (t) JE (c)       Row Name 04/09/24 0815          Therapy Assessment/Plan (PT)     Patient/Family Therapy Goals Statement (PT) Pt wants to return home.  -JE (r) SW (t) JE (c)     Rehab Potential (PT) good, to achieve stated therapy goals  -JE (r) SW (t) JE (c)     Criteria for Skilled Interventions Met (PT) yes;meets criteria;skilled treatment is necessary  -JE (r) SW (t) JE (c)     Therapy Frequency (PT) 2 times/day  -JE (r) SW (t) JE (c)     Predicted Duration of Therapy Intervention (PT) Until goals are met or d/c  -JE (r) SW (t) JE (c)       Row Name 04/09/24 0815          Vital Signs    O2 Delivery Pre Treatment room air  -JE (r) SW (t) JE (c)     O2 Delivery Intra Treatment room air  -JE (r) SW (t) JE (c)     O2 Delivery Post Treatment room air  -JE (r) SW (t) JE (c)     Pre Patient Position Sitting  -JE (r) SW (t) JE (c)     Intra Patient Position Standing  -JE (r) SW (t) JE (c)     Post Patient Position Sitting  -JE (r) SW (t) JE (c)       Row Name 04/09/24 0815          Positioning and Restraints    Pre-Treatment Position sitting in chair/recliner  -JE (r) SW (t) JE (c)     Post Treatment Position chair  -JE (r) SW (t) JE (c)     In Chair reclined;sitting;call light within reach;encouraged to call for assist;exit alarm on;legs elevated;heels elevated  -JE (r) SW (t) JE (c)               User Key  (r) = Recorded By, (t) = Taken By, (c) = Cosigned By      Initials Name Provider Type    Melly Brown, PT Physical Therapist    Kevin Ríos, PT Student PT Student                   Outcome Measures       Row Name 04/09/24 0815          How much help from another person do you currently need...    Turning from your back to your side while in flat bed without using bedrails? 4  -JE (r) SW (t) JE (c)     Moving from lying on back to sitting on the side of a flat bed without bedrails? 4  -JE (r) SW (t) JE (c)     Moving to and from a bed to a chair (including a wheelchair)? 3  -JE (r) SW (t) JE (c)     Standing up from a chair using your arms (e.g., wheelchair, bedside chair)? 3  -JE  (r) SW (t) JE (c)     Climbing 3-5 steps with a railing? 2  -JE (r) SW (t) JE (c)     To walk in hospital room? 3  -JE (r) SW (t) JE (c)     AM-PAC 6 Clicks Score (PT) 19  -JE (r) SW (t)     Highest Level of Mobility Goal 6 --> Walk 10 steps or more  -JE (r) SW (t)       Row Name 04/09/24 1022 04/09/24 0815       Functional Assessment    Outcome Measure Options AM-PAC 6 Clicks Daily Activity (OT)  -LS AM-PAC 6 Clicks Basic Mobility (PT)  -JE (r) SW (t) JE (c)              User Key  (r) = Recorded By, (t) = Taken By, (c) = Cosigned By      Initials Name Provider Type    Melly Brown, PT Physical Therapist    Alesha Bloom, OTR/L Occupational Therapist    Kevin Ríos, PT Student PT Student                                 Physical Therapy Education       Title: PT OT SLP Therapies (In Progress)       Topic: Physical Therapy (In Progress)       Point: Mobility training (Done)       Learning Progress Summary             Patient Acceptance, E, VU by  at 4/9/2024 0957    Comment: Pt educated on continued POC, plans for d/c, and informed of progress made to date.    Acceptance, E,TB,D, VU,NR by  at 3/29/2024 1009    Comment: education re: purpose of PT/importance of activity, safety/falls prevention, NWB L LE, improved tech w/ bed mobility, positioning for pressure relief                         Point: Home exercise program (Not Started)       Learner Progress:  Not documented in this visit.              Point: Precautions (Done)       Learning Progress Summary             Patient Acceptance, E, VU by YIMI at 4/9/2024 0957    Comment: Pt educated on continued POC, plans for d/c, and informed of progress made to date.    Acceptance, E,TB,D, VU,NR by  at 3/29/2024 1009    Comment: education re: purpose of PT/importance of activity, safety/falls prevention, NWB L LE, improved tech w/ bed mobility, positioning for pressure relief                                         User Key       Initials Effective  Dates Name Provider Type Discipline     08/02/18 -  Melly Mustafa, PT Physical Therapist PT     02/12/24 -  Kevin Nogueira PT Student PT Student PT                  PT Recommendation and Plan  Planned Therapy Interventions (PT): balance training, bed mobility training, gait training, home exercise program, patient/family education, postural re-education, strengthening, stair training, transfer training  Plan of Care Reviewed With: patient  Progress: improving  Outcome Evaluation: PT re-eval complete. Pt seated in recliner with legs elevated, left foot bandaged/wrapped, and chair alarm. A&Ox4. Pt reports feeling pain in his stomach, nausea, and numbness and tingling in his hands and feet. Pt has BLE ROM WFL and decreased hip flexor strength. Pt maxA don/doff cam boot. Pt was able to demonstrate sit to/from stand with CGA, cues, Cam boot, and Rwx; Stand to sit was performed three times to practice safety awareness technique for using both arms to grab ahold of chair; Pt could ambulate a distance of 100 feet with cam boot, CGA, and Rwx; Pt's gait pattern had decreased stride length on right side, decreased ambulation speed, and forward trunk lean. Pt is showing signs of improvement and would continue to benefit from PT. Recommend d/c sub-acute or SNF however patient intends to return home with his spouse.     Time Calculation:         PT Charges       Row Name 04/09/24 0815             Time Calculation    Start Time 0815  -JE (r) SW (t) JE (c)      Stop Time 0912  -JE (r) SW (t) JE (c)      Time Calculation (min) 57 min  -JE (r) SW (t)      PT Received On 04/09/24  -JE (r) SW (t) JE (c)      PT Goal Re-Cert Due Date 04/19/24  -JE (r) SW (t) JE (c)         Timed Charges    03969 - Gait Training Minutes  15  -JE (r) SW (t) JE (c)      74128 - PT Therapeutic Activity Minutes 12  -JE (r) SW (t) JE (c)         Untimed Charges    PT Eval/Re-eval Minutes 30  -JE (r) SW (t) JE (c)         Total Minutes    Timed  Charges Total Minutes 27  -JE (r) SW (t)      Untimed Charges Total Minutes 30  -JE (r) SW (t)       Total Minutes 57  -JE (r) SW (t)                User Key  (r) = Recorded By, (t) = Taken By, (c) = Cosigned By      Initials Name Provider Type    Melly Brown, PT Physical Therapist    Kevin Ríos, PT Student PT Student                      PT G-Codes  Outcome Measure Options: AM-PAC 6 Clicks Daily Activity (OT)  AM-PAC 6 Clicks Score (PT): 19  AM-PAC 6 Clicks Score (OT): 16  PT Discharge Summary  Anticipated Discharge Disposition (PT): sub acute care setting, skilled nursing facility, other (see comments) (Pending progress)    Kevin Nogueira, PT Student  4/9/2024

## 2024-04-09 NOTE — THERAPY TREATMENT NOTE
Acute Care - Physical Therapy Treatment Note  [facility]     Patient Name: Erick Luong  : 1955  MRN: [id number]  Today's Date: 2024      Visit Dx:     ICD-10-CM ICD-9-CM   1. Diabetic foot infection  E11.628 250.80    L08.9 686.9   2. Poorly controlled diabetes mellitus  E11.65 250.00   3. Impaired functional mobility and activity tolerance [Z74.09]  Z74.09 V49.89   4. Rectal bleeding  K62.5 569.3   5. Anemia due to stage 3 chronic kidney disease, unspecified whether stage 3a or 3b CKD  N18.30 285.21    D63.1 585.3     Patient Active Problem List   Diagnosis    Obesity, unspecified obesity severity, unspecified obesity type    Essential hypertension    Type 2 diabetes mellitus with hyperglycemia, with long-term current use of insulin    Nonsmoker    Anemia due to chronic kidney disease    Class 3 severe obesity due to excess calories with body mass index (BMI) of 40.0 to 44.9 in adult    Anasarca    Sleep apnea with use of continuous positive airway pressure (CPAP)    Medically noncompliant    Diabetic ulcer of left midfoot associated with type 2 diabetes mellitus, with fat layer exposed    Diabetic polyneuropathy associated with type 2 diabetes mellitus    Spinal stenosis in cervical region    Degeneration of cervical intervertebral disc    Cervical radiculopathy    Degeneration of lumbar or lumbosacral intervertebral disc    Cervical myelopathy    Bilateral carpal tunnel syndrome    CAD (coronary artery disease)    GERD without esophagitis    BPH without obstruction/lower urinary tract symptoms    Stage 3b chronic kidney disease    Chronic diastolic heart failure    Type 2 myocardial infarction due to heart failure    Left carpal tunnel syndrome    Syncope and collapse, recurrent episodes    Poorly-controlled hypertension    Rhinovirus    Peripheral vascular disease    Chronic kidney disease (CKD), stage IV (severe)    Diabetic foot infection    Sepsis     Past Medical History:   Diagnosis Date     Arthritis     Autonomic disease     CAD (coronary artery disease) 02/06/2017    Cervical radiculopathy 09/16/2021    Chronic constipation with acute exaccerbation 05/10/2021    Coronary artery disease     Degeneration of cervical intervertebral disc 08/11/2021    Diabetes mellitus     Diabetic foot ulcer 08/31/2020    Diabetic polyneuropathy associated with type 2 diabetes mellitus 01/18/2021    Elevated cholesterol     Gastroesophageal reflux disease 05/13/2019    Headache     HTN (hypertension), benign 05/03/2017    Hyperlipidemia     Hypertension     Mixed hyperlipidemia 02/07/2017    Multiple lung nodules 01/26/2020    5mm, 9 mm RLL identified 1/2020, not present 10/2019.    Myocardial infarction     Osteomyelitis 01/22/2020    Osteomyelitis of fifth toe of right foot 10/07/2019    Pancreatitis     Persistent insomnia 01/20/2020    Renal disorder     Sleep apnea 02/06/2017    Sleep apnea with use of continuous positive airway pressure (CPAP)     NON-COMPLIANT    Slow transit constipation 01/16/2019    Spinal stenosis in cervical region 09/16/2021    Vitamin D deficiency 03/02/2021     Past Surgical History:   Procedure Laterality Date    ABDOMINAL SURGERY      AMPUTATION FOOT / TOE Left 10/2021    5th digit     ANTERIOR CERVICAL DISCECTOMY W/ FUSION N/A 8/5/2022    Procedure: CERVICAL DISCECTOMY ANTERIOR WITH FUSION C5-6 with possible plating of C5-7 with neuromonitoring and 1 c-arm;  Surgeon: Karel Soliz MD;  Location:  [facility] OR;  Service: Neurosurgery;  Laterality: N/A;    APPENDECTOMY      BACK SURGERY      CARDIAC CATHETERIZATION Left 02/08/2021    Procedure: Left Heart Cath w poss intervention left anatomical snuff box acess;  Surgeon: Omkar Charles DO;  Location:  [facility] CATH INVASIVE LOCATION;  Service: Cardiology;  Laterality: Left;    CARDIAC SURGERY      CATARACT EXTRACTION      CERVICAL SPINE SURGERY      COLONOSCOPY N/A 01/31/2017    Normal exam repeat in 5 years    COLONOSCOPY N/A  02/11/2019    Mild acute inflammation    COLONOSCOPY N/A 4/7/2024    Procedure: COLONOSCOPY WITH ANESTHESIA;  Surgeon: Raymond Mederos MD;  Location: [facility] OR;  Service: Gastroenterology;  Laterality: N/A;  Pre: Anemia, Rectal bleed;  Post: Visible vessel at 20cm, clip x6 placed;  Del Shetty MD    COLONOSCOPY W/ POLYPECTOMY  03/04/2014    Hyperplastic polyp    CORONARY ARTERY BYPASS GRAFT  10/2015    ENDOSCOPY  04/13/2011    Gastritis with hemorrhage    ENDOSCOPY N/A 05/05/2017    Normal exam    ENDOSCOPY N/A 02/11/2019    Gastritis    ENDOSCOPY N/A 09/01/2020    Non-erosive gastritis with hemorrhage    ENDOSCOPY N/A 02/10/2021    Esophagitis    FOOT SURGERY Left     INCISION AND DRAINAGE OF WOUND Left 09/2007    spider bite     PT Assessment (Last 12 Hours)       PT Evaluation and Treatment       Row Name 04/09/24 1350          Physical Therapy Time and Intention    Subjective Information complains of;pain;weakness  -LY     Document Type therapy note (daily note)  -LY     Mode of Treatment physical therapy  -LY       Row Name 04/09/24 1350          General Information    Existing Precautions/Restrictions fall  LLE WBAT in CAM boot  -LY       Row Name 04/09/24 1350          Pain    Pretreatment Pain Rating 8/10  -LY     Posttreatment Pain Rating 8/10  -LY     Pain Location - abdomen  -LY     Pain Intervention(s) Medication (See MAR);Ambulation/increased activity  -LY       Row Name 04/09/24 1350          Mobility    Extremity Weight-bearing Status left lower extremity  -LY     Left Lower Extremity (Weight-bearing Status) weight-bearing as tolerated (WBAT);other (see comments)  in CAM boot  -LY       Row Name 04/09/24 1350          Sit-Stand Transfer    Sit-Stand [place] (Transfers) contact guard;verbal cues  -LY     Assistive Device (Sit-Stand Transfers) walker, front-wheeled  -LY       Row Name 04/09/24 1350          Stand-Sit Transfer    Stand-Sit [place] (Transfers) verbal cues;contact guard   -LY     Assistive Device (Stand-Sit Transfers) walker, front-wheeled  -LY       Row Name 04/09/24 1350          Toilet Transfer    Type (Toilet Transfer) sit-stand;stand-sit  -LY     [place] Level (Toilet Transfer) contact guard  -LY     Assistive Device (Toilet Transfer) walker, front-wheeled;grab bars/safety frame  -LY       Row Name 04/09/24 1350          Gait/Stairs (Locomotion)    [place] Level (Gait) verbal cues;contact guard  -LY     Assistive Device (Gait) walker, front-wheeled  -LY     Distance in Feet (Gait) 120  -LY     Pattern (Gait) step-through  -LY     Deviations/Abnormal Patterns (Gait) gait speed decreased;stride length decreased  -LY     Bilateral Gait Deviations forward flexed posture  -LY     Comment, (Gait/Stairs) cues for increased L step height to improve foot clearance  -LY       Row Name 04/09/24 1350          Motor Skills    Comments, Therapeutic Exercise BLE AROM x10 reps seated  -LY       Row Name             Wound 06/15/23 0102 Left anterior plantar    Wound - Properties Group Placement Date: 06/15/23  -SM Placement Time: 0102  -SM Side: Left  -SM Orientation: anterior  -SM Location: plantar  -SM    Retired Wound - Properties Group Placement Date: 06/15/23  -SM Placement Time: 0102  -SM Side: Left  -SM Orientation: anterior  -SM Location: plantar  -SM    Retired Wound - Properties Group Date first assessed: 06/15/23  -SM Time first assessed: 0102  -SM Side: Left  -SM Location: plantar  -SM      Row Name             Wound 08/22/23 0140 Left posterior plantar    Wound - Properties Group Placement Date: 08/22/23  -AM Placement Time: 0140  -AM Present on Original Admission: Y  -AM Side: Left  -AM Orientation: posterior  -AM Location: plantar  -AM    Retired Wound - Properties Group Placement Date: 08/22/23  -AM Placement Time: 0140  -AM Present on Original Admission: Y  -AM Side: Left  -AM Orientation: posterior  -AM Location: plantar  -AM    Retired Wound - Properties Group Date  first assessed: 08/22/23  -AM Time first assessed: 0140  -AM Present on Original Admission: Y  -AM Side: Left  -AM Location: plantar  -AM      Row Name             Wound Left lateral foot Diabetic Ulcer    Wound - Properties Group Side: Left  -MH Orientation: lateral  -MH Location: foot  -JACIEL, 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;diabetic foot ulcer/gangrene  Primary Wound Type: Diabetic ulc  -JACIEL Wound Outcome: Amputation  -JACIEL Stage, Pressure Injury : other (see comments)  -JACIEL, full thickness starting 10/20/21     Retired Wound - Properties Group Side: Left  -MH Orientation: lateral  -MH Location: foot  -JACIEL, 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;diabetic foot ulcer/gangrene  Primary Wound Type: Diabetic ulc  -JACIEL Stage, Pressure Injury : other (see comments)  -JACIEL, full thickness starting 10/20/21  Wound Outcome: Amputation  -JACIEL    Retired Wound - Properties Group Side: Left  -MH Location: foot  -JACIEL, 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;diabetic foot ulcer/gangrene  Primary Wound Type: Diabetic ulc  -JACIEL Wound Outcome: Amputation  -JACIEL      Row Name             Wound 04/04/24 2100 Left posterior wrist Skin Tear    Wound - Properties Group Placement Date: 04/04/24  -NR Placement Time: 2100  -NR Present on Original Admission: N  -NR Side: Left  -NR Orientation: posterior  -NR Location: wrist  -NR Primary Wound Type: Skin tear  -NR Additional Comments: likely caused by wristband. Band removed, site cleaned with alcohol swab, wraped in gauze  -NR    Retired Wound - Properties Group Placement Date: 04/04/24  -NR Placement Time: 2100  -NR Present on Original Admission: N  -NR Side: Left  -NR Orientation: posterior  -NR Location: wrist  -NR Primary Wound Type: Skin tear  -NR Additional Comments: likely caused by wristband. Band removed, site cleaned with alcohol swab, wraped in gauze  -NR    Retired Wound - Properties Group Date first assessed: 04/04/24  -NR Time first assessed: 2100  -NR Present on Original Admission: N  -NR Side: Left  -NR Location: wrist  -NR  Primary Wound Type: Skin tear  -NR Additional Comments: likely caused by wristband. Band removed, site cleaned with alcohol swab, wraped in gauze  -NR      Row Name 04/09/24 1350          Positioning and Restraints    Pre-Treatment Position sitting in chair/recliner  -LY     Post Treatment Position chair  -LY     In Chair reclined;call light within reach;encouraged to call for assist;exit alarm on;patient within staff view  -LY               User Key  (r) = Recorded By, (t) = Taken By, (c) = Cosigned By      Initials Name Provider Type    Yuliana Lisa, RN Registered Nurse    MH Haase, Mallory L, RN Registered Nurse    Alesha Armenta, PTA Physical Therapist Assistant    Faviola Kunz RN Registered Nurse    Michelle Diaz RN Registered Nurse    Reuben Johnson RN Registered Nurse                    Physical Therapy Education       Title: PT OT SLP Therapies (In Progress)       Topic: Physical Therapy (In Progress)       Point: Mobility training (Done)       Learning Progress Summary             Patient Acceptance, E, VU by  at 4/9/2024 0957    Comment: Pt educated on continued POC, plans for d/c, and informed of progress made to date.    Acceptance, E,TB,D, VU,NR by  at 3/29/2024 1009    Comment: education re: purpose of PT/importance of activity, safety/falls prevention, NWB L LE, improved tech w/ bed mobility, positioning for pressure relief                         Point: Home exercise program (Not Started)       Learner Progress:  Not documented in this visit.              Point: Precautions (Done)       Learning Progress Summary             Patient Acceptance, E, VU by YIMI at 4/9/2024 0957    Comment: Pt educated on continued POC, plans for d/c, and informed of progress made to date.    Acceptance, E,TB,D, VU,NR by  at 3/29/2024 1009    Comment: education re: purpose of PT/importance of activity, safety/falls prevention, NWB L LE, improved tech w/ bed mobility, positioning  for pressure relief                                         User Key       Initials Effective Dates Name Provider Type Discipline     08/02/18 -  Melly Mustafa, PT Physical Therapist PT    YIMI 02/12/24 -  Kevin Nogueira PT Student PT Student PT                  PT Recommendation and Plan     Plan of Care Reviewed With: patient  Progress: improving       Time Calculation:    PT Charges       Row Name 04/09/24 1434 04/09/24 0815          Time Calculation    Start Time 1350  -LY 0815  -JE (r) SW (t) JE (c)     Stop Time 1413  -LY 0912  -JE (r) SW (t) JE (c)     Time Calculation (min) 23 min  -LY 57 min  -JE (r) SW (t)     PT Received On 04/09/24  -LY 04/09/24  -JE (r) SW (t) JE (c)     PT Goal Re-Cert Due Date -- 04/19/24  -JE (r) SW (t) JE (c)        Time Calculation- PT    Total Timed Code Minutes- PT 23 minute(s)  -LY --        Timed Charges    85684 - PT Therapeutic Exercise Minutes 10  -LY --     39928 - Gait Training Minutes  13  -LY 15  -JE (r) SW (t) JE (c)     44524 - PT Therapeutic Activity Minutes -- 12  -JE (r) SW (t) JE (c)        Untimed Charges    PT Eval/Re-eval Minutes -- 30  -JE (r) SW (t) JE (c)        Total Minutes    Timed Charges Total Minutes 23  -LY 27  -JE (r) SW (t)     Untimed Charges Total Minutes -- 30  -JE (r) SW (t)      Total Minutes 23  -LY 57  -JE (r) SW (t)               User Key  (r) = Recorded By, (t) = Taken By, (c) = Cosigned By      Initials Name Provider Type    LY Alesha Dominguez, PTA Physical Therapist Assistant    Melly Brown, PT Physical Therapist    Kevin Ríos, PT Student PT Student                  Therapy Charges for Today       Code Description Service Date Service Provider Modifiers Qty    27026186098 HC GAIT TRAINING EA 15 MIN 4/8/2024 Alesha Dominguez, PTA GP 1    23187136019 HC PT THERAPEUTIC ACT EA 15 MIN 4/8/2024 Alesha Dominguez, PTA GP 1    88334234801 HC PT THER PROC EA 15 MIN 4/9/2024 Alesha Dominguez, PTA GP 1    [id number]  GAIT TRAINING EA  15 MIN 4/9/2024 Alesha Dominguez, PTA GP 1            PT G-Codes  Outcome Measure Options: AM-PAC 6 Clicks Daily Activity (OT)  AM-PAC 6 Clicks Score (PT): 20  AM-PAC 6 Clicks Score (OT): 16    Alesha Dominguez PTA  4/9/2024

## 2024-04-09 NOTE — PLAN OF CARE
Goal Outcome Evaluation:  Plan of Care Reviewed With: patient        Progress: improving  Outcome Evaluation: PT re-eval complete. Pt seated in recliner with legs elevated, left foot bandaged/wrapped, and chair alarm. A&Ox4. Pt reports feeling pain in his stomach, nausea, and numbness and tingling in his hands and feet. Pt has BLE ROM WFL and decreased hip flexor strength. Pt maxA don/doff cam boot. Pt was able to demonstrate sit to/from stand with CGA, cues, Cam boot, and Rwx; Stand to sit was performed three times to practice safety awareness technique for using both arms to grab ahold of chair; Pt could ambulate a distance of 100 feet with cam boot, CGA, and Rwx; Pt's gait pattern had decreased stride length on right side, decreased ambulation speed, and forward trunk lean. Pt is showing signs of improvement and would continue to benefit from PT. Recommend d/c sub-acute or SNF however patient intends to return home with his spouse.      Anticipated Discharge Disposition (PT): sub acute care setting, skilled nursing facility, other (see comments) (Pending progress)

## 2024-04-09 NOTE — PROGRESS NOTES
"INFECTIOUS DISEASES PROGRESS NOTE    Patient:  Erick Luong  YOB: 1955  MRN: [id number]   Admit date: 3/26/2024   Admitting Physician: Rene Maloney MD  Primary Care Physician: Del Shetty MD    Chief Complaint: None offered.    Interval History: Noted patient did undergo incomplete colonoscopy and report of sigmoid area of bleeding that was clipped.    He indicates he is eating well.    Allergies:   Allergies   Allergen Reactions    Cefepime Hives and Anaphylaxis    Bactrim [Sulfamethoxazole-Trimethoprim] Other (See Comments)     \"RENAL FAILURE\"    Vancomycin Itching    Zolpidem Unknown - High Severity     \"makes him crazy\"    Zolpidem Tartrate Unknown - Low Severity and Provider Review Needed    Metronidazole Rash       Current Scheduled Medications:   ascorbic acid, 1,000 mg, Oral, Daily  bumetanide, 2 mg, Oral, Daily  carvedilol, 3.125 mg, Oral, BID With Meals  donepezil, 10 mg, Oral, Daily  hydrocortisone, 25 mg, Rectal, BID  insulin detemir, 30 Units, Subcutaneous, Nightly  insulin lispro, 4-24 Units, Subcutaneous, 4x Daily AC & at Bedtime  insulin regular, 10 Units, Subcutaneous, TID AC  isosorbide mononitrate, 30 mg, Oral, Q24H  lactulose, 20 g, Oral, TID  linezolid, 600 mg, Oral, Q12H  melatonin, 6 mg, Oral, Nightly  midodrine, 2.5 mg, Oral, TID AC  mupirocin, 1 Application, Each Nare, BID  pantoprazole, 40 mg, Oral, BID AC  senna-docusate sodium, 1 tablet, Oral, BID   And  polyethylene glycol, 17 g, Oral, Daily  pregabalin, 100 mg, Oral, Nightly  pregabalin, 50 mg, Oral, Daily  rosuvastatin, 10 mg, Oral, Nightly  sodium chloride, 10 mL, Intravenous, Q12H  sodium chloride, 10 mL, Intravenous, Q12H  sucralfate, 1 g, Oral, TID AC  tamsulosin, 0.4 mg, Oral, Daily  timolol, 1 drop, Both Eyes, Q12H  zinc sulfate, 220 mg, Oral, Daily      Current PRN Medications:    acetaminophen **OR** acetaminophen **OR** acetaminophen    senna-docusate sodium **AND** polyethylene glycol **AND** " "bisacodyl **AND** bisacodyl    dextrose    dextrose    Diclofenac Sodium    dicyclomine    [Transfer Hold] glucagon (human recombinant)    magnesium hydroxide    nitroglycerin    ondansetron ODT **OR** ondansetron    oxyCODONE    sodium chloride    sodium chloride    sodium chloride    sodium chloride    sodium chloride    sodium chloride, 100 mL/hr           Objective     Vital Signs:  Temp (24hrs), Av.6 °F (36.4 °C), Min:97.3 °F (36.3 °C), Max:98.2 °F (36.8 °C)      /58 (BP Location: Right arm, Patient Position: Sitting)   Pulse 63   Temp 97.4 °F (36.3 °C) (Oral)   Resp 16   Ht 182.9 cm (72\")   Wt 131 kg (289 lb 3.2 oz)   SpO2 100%   BMI 39.22 kg/m²         Physical Exam:  General: The patient is sitting up in the recliner in no acute distress  Abdomen: Soft, nontender, nondistended  Left lower extremity dressing intact.  Appears significant amount of Betadine present.          esults Review:    I reviewed the patient's new clinical results.    Lab Results:    CBC:   Lab Results   Lab 24  0334 24  0514 24  0512 24  0340 24  1344 24  0422 24  1429 24  0623 24  1218 24  1722 24  0004 24  0611   WBC 6.54 5.64 6.93 5.43  --  5.70  --  6.19  --   --   --  4.35   HEMOGLOBIN 9.0* 10.0* 10.9* 9.5*   < > 9.2*   < > 8.7*   < > 7.8* 8.4* 9.2*   HEMATOCRIT 28.9* 31.3* 34.5* 30.2*   < > 29.5*   < > 27.0*   < > 24.3* 26.5* 29.0*   PLATELETS 197 251 321 294  --  275  --  246  --   --   --  231    < > = values in this interval not displayed.        AutoDiff:   Lab Results   Lab 24  0422 24  0623 24  0611   NEUTROPHIL % 55.3 65.7 45.1   LYMPHOCYTE % 29.6 22.1 40.0   MONOCYTES % 9.6 8.7 9.9   EOSINOPHIL % 4.4 2.6 4.1   BASOPHIL % 0.7 0.6 0.7   NEUTROS ABS 3.15 4.06 1.96   LYMPHS ABS 1.69 1.37 1.74   MONOS ABS 0.55 0.54 0.43   EOS ABS 0.25 0.16 0.18   BASOS ABS 0.04 0.04 0.03        Manual Diff:    Lab Results   Lab " 04/07/24 0422 04/08/24  0623 04/09/24  0611   NEUTROS ABS 3.15 4.06 1.96           CMP:   Lab Results   Lab 04/05/24  0512 04/06/24  0340 04/07/24  0422 04/08/24  0623 04/09/24  0610   SODIUM 139  139   < > 141 141 141   POTASSIUM 4.4  4.4   < > 4.0 4.0 4.1   CHLORIDE 102  102   < > 103 104 104   CO2 25.0  25.0   < > 27.0 25.0 26.0   BUN 31*  31*   < > 33* 30* 28*   CREATININE 1.98*  1.98*   < > 1.75* 1.61* 1.84*   CALCIUM 11.1*  11.1*   < > 10.1 9.1 9.4   BILIRUBIN 0.4  --   --  0.4  --    ALK PHOS 138*  --   --  107  --    ALT (SGPT) 18  --   --  14  --    AST (SGOT) 22  --   --  16  --    GLUCOSE 173*  173*   < > 94 82 170*    < > = values in this interval not displayed.       Estimated Creatinine Clearance: 54.6 mL/min (A) (by C-G formula based on SCr of 1.84 mg/dL (H)).    Culture Results:    Microbiology Results (last 10 days)       Procedure Component Value - Date/Time    Blood Culture With DARSHAN - Blood, Hand, Right [[id number]]  (Normal) Collected: 04/01/24 0402    Lab Status: Final result Specimen: Blood from Hand, Right Updated: 04/06/24 0445     Blood Culture No growth at 5 days               Radiology:       Imaging Results (Last 72 Hours)       Procedure Component Value Units Date/Time    NM GI Blood Loss [[id number]] Collected: 04/06/24 1327     Updated: 04/06/24 1334    Narrative:      EXAM: NM GI BLOOD LOSS-     INDICATION: Lower GI bleed (Ped 0-17y)     RADIOPHARMACEUTICAL: Tc-99m labeled red cells 27.6 mCi IV     TECHNICAL: The patient's red blood cells were labeled in vitro and  reinjected.  Sequential five-minute anterior planar images of the  abdomen were obtained for two hours.  These were also reformatted into  cine mode.     COMPARISON: CT abdomen pelvis 7/22/2023     FINDINGS:     No evidence of an active GI bleed.       Impression:         No evidence of an active GI bleed.      This report was signed and finalized on 4/6/2024 1:31 PM by Ronal Wisdom.                   Active  Hospital Problems    Diagnosis     **Sepsis     Diabetic foot infection     Chronic kidney disease (CKD), stage IV (severe)     Chronic diastolic heart failure     BPH without obstruction/lower urinary tract symptoms     Diabetic polyneuropathy associated with type 2 diabetes mellitus     Anemia due to chronic kidney disease     Essential hypertension     Type 2 diabetes mellitus with hyperglycemia, with long-term current use of insulin        IMPRESSION:    MRSA bacteremia-blood cultures + March 26 and March 29.  Secondary to infected left foot with wounds and associated cellulitis on admission.  Blood cultures negative as of March 30.  Today is day #11/14 of antibiotic therapy with linezolid since first negative blood culture  Chronic kidney disease stage IIIb-nephrology following  Type 2 diabetes mellitus-poorly controlled with hemoglobin A1c of 10.7 this admission  Bright red blood per rectum-colonoscopy with sigmoid bleeding that was clipped.  MRSA nasal screen positive      RECOMMENDATION:   Continue linezolid-changed to p.o. plan to complete a 14-day course.  Hemoglobin monitoring per attending/GI-noted recommendations for fully prepped colonoscopy as outpatient.  Wound care per Dr. Cerrato's orders  Glucose management per attending  Fluid/diuretic management per nephrology.          Julia Adrian MD  04/09/24  09:36 CDT

## 2024-04-09 NOTE — PROGRESS NOTES
Nephrology ([facility]) Progress Note      Patient:  Erick Luong  YOB: 1955  Date of Service: 4/9/2024  MRN: [id number]   Acct: [id number]   Primary Care Physician: Del Shetty MD  Advance Directive:   Code Status and Medical Interventions:   Ordered at: 03/26/24 1815     Level Of Support Discussed With:    Health Care Surrogate     Code Status (Patient has no pulse and is not breathing):    CPR (Attempt to Resuscitate)     Medical Interventions (Patient has pulse or is breathing):    Full Support     Admit Date: 3/26/2024       Hospital Day: 14  Referring Provider: No ref. provider found      Patient personally seen and examined.  Complete chart including Consults, Notes, Operative Reports, Labs, Cardiology, and Radiology studies reviewed as able.        Subjective:  Erick Luong is a 68 y.o. male for whom we were consulted for evaluation and treatment of acute kidney injury.  He has stage IIIb chronic kidney disease baseline, follows with Dr Lomas in our office.  Baseline creatinine approximately 1.8. He has history of insulin-dependent type 2 diabetes, hypertension, abdominal obesity, obstructive sleep apnea, diabetic foot ulcer and coronary artery disease.   Patient presented to ER on 3/26 with altered mental status. Had debridement of ongoing wound on left foot the day prior. Left foot warm and erythremic on arrival to ER. Noted to have elevated blood glucose over 400. Initial creatinine of 1.9.  Admitted to medical floor for sepsis due to left foot wound. Patient has been agitated and confused during the admission, requiring wrist restraints due to pulling at lines and tubes. Renal function and mentation have been improving. Now has a sitter present in room due to repeatedly getting up unattended. He is s/p colonoscopy on 4/07 due to rectal bleding.     Today is awake and alert. No new complaint. Some nausea overnight. Urine output nonoliguric      Allergies:  Cefepime, Bactrim [sulfamethoxazole-trimethoprim], Vancomycin, Zolpidem, Zolpidem tartrate, and Metronidazole    Home Meds:  Medications Prior to Admission   Medication Sig Dispense Refill Last Dose    amitriptyline (ELAVIL) 25 MG tablet Take 2 tablets by mouth Daily at bedtime. (Patient not taking: Reported on 3/27/2024) 60 tablet 1 Not Taking    apixaban (ELIQUIS) 5 MG tablet tablet Take 1 tablet by mouth Every 12 (Twelve) Hours.       ascorbic acid (VITAMIN C) 1000 MG tablet Take 1 tablet by mouth Daily. 30 tablet 3     Aspirin 81 MG capsule Take 81 mg by mouth Daily.       bumetanide (BUMEX) 1 MG tablet Take 1 tablet every day by oral route for 30 days. 30 tablet 0     bumetanide (BUMEX) 2 MG tablet Take 1 tablet by mouth Daily. Take 2 tablets in morning;1 tablet at night (Patient not taking: Reported on 3/27/2024)   Not Taking    busPIRone (BUSPAR) 10 MG tablet Take 1 tablet by mouth 3 (Three) Times a Day.       calcitriol (ROCALTROL) 0.5 MCG capsule Take 1 capsule by mouth Daily. 90 capsule 4     calcitriol (ROCALTROL) 0.5 MCG capsule Take 1 capsule every day by oral route for 90 days. (Patient not taking: Reported on 3/27/2024) 90 capsule 4 Not Taking    carvedilol (COREG) 3.125 MG tablet Take 1 tablet twice a day by oral route. 60 tablet 4     carvedilol (COREG) 6.25 MG tablet Take 1 tablet by mouth 2 (Two) Times a Day. (Patient not taking: Reported on 3/27/2024) 180 tablet 4 Not Taking    Cholecalciferol (D-5000) 125 MCG (5000 UT) tablet Take 1 tablet by mouth Daily Before Lunch. 30 tablet 2     cloNIDine (CATAPRES) 0.1 MG tablet Take 1 tablet by mouth Every 12 (Twelve) Hours. (Patient not taking: Reported on 3/27/2024) 60 tablet 2 Not Taking    Diclofenac Sodium (VOLTAREN) 1 % gel gel Apply 2 g topically to the appropriate area as directed 4 (Four) Times a Day As Needed. 300 g 11     Diclofenac Sodium (VOLTAREN) 1 % gel gel Apply 2 g topically to the appropriate area as directed 4 (Four)  Times a Day. (Patient not taking: Reported on 3/27/2024) 300 g 11 Not Taking    donepezil (ARICEPT) 10 MG tablet Take 1 tablet by mouth Daily. (Patient not taking: Reported on 3/27/2024) 90 tablet 4 Not Taking    Dulaglutide (Trulicity) 4.5 MG/0.5ML solution pen-injector Inject 0.5 mL under the skin into the appropriate area as directed 1 (One) Time Per Week. (Patient not taking: Reported on 3/27/2024) 2 mL 11 Not Taking    DULoxetine (CYMBALTA) 60 MG capsule Take 1 capsule by mouth Daily. 90 capsule 4     DULoxetine (CYMBALTA) 60 MG capsule Take 1 capsule by mouth Daily. (Patient not taking: Reported on 3/27/2024) 90 capsule 4 Not Taking    DULoxetine (CYMBALTA) 60 MG capsule Take 1 capsule by mouth Daily. (Patient not taking: Reported on 3/27/2024) 90 capsule 4 Not Taking    empagliflozin (JARDIANCE) 25 MG tablet tablet Take 1 tablet by mouth Daily. (Patient not taking: Reported on 3/27/2024) 30 tablet 2 Not Taking    famotidine (PEPCID) 20 MG tablet Take 1 tablet twice a day by oral route. 180 tablet 4     fluticasone (FLONASE) 50 MCG/ACT nasal spray 1 spray into the nostril(s) as directed by provider Daily. (Patient taking differently: 1 spray into the nostril(s) as directed by provider 2 (Two) Times a Day.) 16 g 1     HYDROcodone-acetaminophen (NORCO) 7.5-325 MG per tablet Take 1 tablet by mouth 2 (Two) Times a Day As Needed. (Patient not taking: Reported on 3/27/2024) 40 tablet 0 Not Taking    Insulin Glargine (Lantus SoloStar) 100 UNIT/ML injection pen Inject 20 Units under the skin into the appropriate area as directed Every Evening. 15 mL 3     Insulin Regular Human, Conc, (HumuLIN R U-500 KwikPen) 500 UNIT/ML solution pen-injector CONCENTRATED injection Inject 120 Units under the skin into the appropriate area as directed 2 (Two) Times a Day with breakfast and dinner. (Patient not taking: Reported on 3/27/2024) 18 mL 5 Not Taking    Insulin Regular Human, Conc, (HumuLIN R U-500 KwikPen) 500 UNIT/ML  solution pen-injector CONCENTRATED injection Inject 40 Units under the skin into the appropriate area with regular meals AND 40 Units with large meals. 54 mL 3     isosorbide mononitrate (IMDUR) 30 MG 24 hr tablet Take 1 tablet by mouth Daily.       magnesium hydroxide (Milk of Magnesia) 400 MG/5ML suspension Take 30 mL every day by oral route for 30 days. 900 mL 0     magnesium hydroxide (Milk of Magnesia) 400 MG/5ML suspension Take 30mL by mouth once daily for 30 days. (Patient not taking: Reported on 3/27/2024) 900 mL 0 Not Taking    melatonin 3 MG tablet Take 1 tablet by mouth At Night As Needed for Sleep.       methylcellulose (Citrucel) oral powder Mix 5g as directed and drink twice daily for 90 days. 900 g 10     metoclopramide (REGLAN) 5 MG tablet Take 1 tablet by mouth 3 times a day.       midodrine (PROAMATINE) 2.5 MG tablet Take 1 tablet by mouth 3 (Three) Times a Day Before Meals.       nitroglycerin (NITROSTAT) 0.4 MG SL tablet Dissolve 1 tablet by mouth every 5 minutes as needed for chest pain; MAX 3 tabs/24 hours.  If no improvement after 3 tabs go to ER 25 tablet 0     ondansetron (ZOFRAN) 4 MG tablet Take 1 tablet by mouth Every 6 (Six) Hours As Needed for Nausea or Vomiting.       oxyCODONE (ROXICODONE) 10 MG tablet Take 1 tablet by mouth twice a day as needed 60 tablet 0     pantoprazole (Protonix) 40 MG EC tablet Take 1 tablet by mouth 2 (Two) Times a Day. (Patient not taking: Reported on 3/27/2024) 180 tablet 4 Not Taking    polyethylene glycol (MIRALAX) 17 g packet Take 17 g by mouth Daily. Obtain OTC       prazosin (MINIPRESS) 1 MG capsule Take 1 capsule by mouth every night at bedtime. 30 capsule 2     pregabalin (LYRICA) 100 MG capsule Take 2 capsules by mouth Every Night.       pregabalin (LYRICA) 50 MG capsule Take 1 capsule by mouth Daily.       rosuvastatin (CRESTOR) 10 MG tablet Take 1 tablet by mouth Daily.       sennosides-docusate (PERICOLACE) 8.6-50 MG per tablet Take 1 tablet by  mouth Every Night. Obtain OTC       sennosides-docusate (PERICOLACE) 8.6-50 MG per tablet Take 1 tablet by mouth every night at bedtime. (Patient not taking: Reported on 3/27/2024) 30 tablet 2 Not Taking    sodium hypochlorite (DAKIN'S 1/4 STRENGTH) 0.125 % solution topical solution 0.125% Apply to affected area twice daily (Patient not taking: Reported on 3/27/2024) 473 mL 3 Not Taking    sodium hypochlorite (DAKIN'S 1/4 STRENGTH) 0.125 % solution topical solution 0.125% Apply to affected area twice daily as directed (Patient not taking: Reported on 3/27/2024) 473 mL 5 Not Taking    sodium hypochlorite (DAKIN'S) 0.25 % topical solution Use to wound daily as instructed 473 mL 1     sucralfate (CARAFATE) 1 g tablet Take 1 tablet by mouth 3 times a day.       tamsulosin (FLOMAX) 0.4 MG capsule 24 hr capsule Take 1 capsule by mouth Daily. 90 capsule 1     timolol (TIMOPTIC) 0.5 % ophthalmic solution Administer 1 drop to both eyes 2 (Two) Times a Day.       valsartan (DIOVAN) 40 MG tablet Take 1 tablet by mouth Daily.       zinc sulfate (ZINCATE) 50 MG capsule Take 1 capsule by mouth Daily.          Medicines:  Current Facility-Administered Medications   Medication Dose Route Frequency Provider Last Rate Last Admin    acetaminophen (TYLENOL) tablet 650 mg  650 mg Oral Q4H PRN Raymond Mederos MD   650 mg at 04/04/24 0002    Or    acetaminophen (TYLENOL) 160 MG/5ML oral solution 650 mg  650 mg Oral Q4H PRN Raymond Mederos MD   650 mg at 03/27/24 2109    Or    acetaminophen (TYLENOL) suppository 650 mg  650 mg Rectal Q4H PRN Raymond Mederos MD        ascorbic acid (VITAMIN C) tablet 1,000 mg  1,000 mg Oral Daily Raymond Mederos MD   1,000 mg at 04/09/24 0827    sennosides-docusate (PERICOLACE) 8.6-50 MG per tablet 1 tablet  1 tablet Oral BID Raymond Mederos MD   1 tablet at 04/08/24 2107    And    polyethylene glycol (MIRALAX) packet 17 g  17 g Oral Daily Raymond Mederos MD   17 g at 04/06/24 0932    And    bisacodyl  (DULCOLAX) EC tablet 5 mg  5 mg Oral Daily PRN Raymond Mederos MD        And    bisacodyl (DULCOLAX) suppository 10 mg  10 mg Rectal Daily PRN Raymond Mederos MD        bumetanide (BUMEX) tablet 2 mg  2 mg Oral Daily Vinny Hartley MD   2 mg at 04/09/24 0827    carvedilol (COREG) tablet 3.125 mg  3.125 mg Oral BID With Meals Rene Maloney MD   3.125 mg at 04/09/24 0827    dextrose (D50W) (25 g/50 mL) IV injection 25 g  25 g Intravenous Q15 Min PRN Raymond Mederos MD        dextrose (GLUTOSE) oral gel 15 g  15 g Oral Q15 Min PRN Raymond Mederos MD   15 g at 03/27/24 1710    Diclofenac Sodium (VOLTAREN) 1 % gel 2 g  2 g Topical 4x Daily PRN Raymond Mederos MD        dicyclomine (BENTYL) capsule 20 mg  20 mg Oral TID PRN Raymond Mederos MD   20 mg at 04/07/24 2050    donepezil (ARICEPT) tablet 10 mg  10 mg Oral Daily Raymond Mederos MD   10 mg at 04/09/24 0828    [Transfer Hold] glucagon (GLUCAGEN) injection 1 mg  1 mg Intramuscular Q15 Min PRN Raquel Duncan, APRN        hydrocortisone (ANUSOL-HC) suppository 25 mg  25 mg Rectal BID Raymond Mederos MD   25 mg at 04/09/24 0828    insulin detemir (LEVEMIR) injection 30 Units  30 Units Subcutaneous Nightly Raymond Mederos MD   30 Units at 04/08/24 2106    Insulin Lispro (humaLOG) injection 4-24 Units  4-24 Units Subcutaneous 4x Daily AC & at Bedtime Raymond Mederos MD   4 Units at 04/09/24 0834    insulin regular (humuLIN R,novoLIN R) injection 10 Units  10 Units Subcutaneous TID AC Raymond Mederos MD   10 Units at 04/09/24 0834    isosorbide mononitrate (IMDUR) 24 hr tablet 30 mg  30 mg Oral Q24H Raymond Mederos MD   30 mg at 04/09/24 0828    lactulose solution 20 g  20 g Oral TID Raymond Mederos MD   20 g at 04/09/24 0828    linezolid (ZYVOX) tablet 600 mg  600 mg Oral Q12H Raymond Mederos MD   600 mg at 04/09/24 0828    magnesium hydroxide (MILK OF MAGNESIA) suspension 10 mL  10 mL Oral Daily PRN Raymond Mederos MD   10 mL at 04/03/24 0414     melatonin tablet 6 mg  6 mg Oral Nightly Raymond Mederos MD   6 mg at 04/08/24 2106    midodrine (PROAMATINE) tablet 2.5 mg  2.5 mg Oral TID AC Raymond Mederos MD   2.5 mg at 04/09/24 0827    mupirocin (BACTROBAN) 2 % nasal ointment 1 Application  1 Application Each Nare BID Raymond Mederos MD   1 Application at 04/09/24 0828    nitroglycerin (NITROSTAT) SL tablet 0.4 mg  0.4 mg Sublingual Q5 Min PRN Raymond Mederos MD        ondansetron ODT (ZOFRAN-ODT) disintegrating tablet 4 mg  4 mg Oral Q6H PRN Raymond Mederos MD   4 mg at 04/08/24 2115    Or    ondansetron (ZOFRAN) injection 4 mg  4 mg Intravenous Q6H PRN Raymond Mederos MD   4 mg at 03/29/24 1837    oxyCODONE (ROXICODONE) immediate release tablet 10 mg  10 mg Oral BID PRN Raymond Mederos MD   10 mg at 04/09/24 1039    pantoprazole (PROTONIX) EC tablet 40 mg  40 mg Oral BID  Rene Maloney MD   40 mg at 04/09/24 0834    pregabalin (LYRICA) capsule 100 mg  100 mg Oral Nightly Rene Maloney MD   100 mg at 04/08/24 2106    pregabalin (LYRICA) capsule 50 mg  50 mg Oral Daily Rene Maloney MD   50 mg at 04/09/24 0834    rosuvastatin (CRESTOR) tablet 10 mg  10 mg Oral Nightly Raymond Mederos MD   10 mg at 04/08/24 2106    sodium chloride 0.9 % flush 10 mL  10 mL Intravenous PRN Raymond Mederos MD        sodium chloride 0.9 % flush 10 mL  10 mL Intravenous Q12H Raymond Mederos MD   10 mL at 04/07/24 2050    sodium chloride 0.9 % flush 10 mL  10 mL Intravenous PRN Raymond Mederos MD        sodium chloride 0.9 % flush 10 mL  10 mL Intravenous Q12H Tj Hardwick CRNA        sodium chloride 0.9 % flush 10 mL  10 mL Intravenous PRN Tj Hardwick CRNA        sodium chloride 0.9 % infusion 40 mL  40 mL Intravenous PRN Raymond Mederos MD        sodium chloride 0.9 % infusion 40 mL  40 mL Intravenous PRN Tj Hardwick CRNA        sodium chloride 0.9 % infusion  100 mL/hr Intravenous Continuous Tj Hardwick CRNA        sucralfate (CARAFATE) 1 GM/10ML  suspension 1 g  1 g Oral TID AC Raymond Mederos MD   1 g at 04/09/24 0828    tamsulosin (FLOMAX) 24 hr capsule 0.4 mg  0.4 mg Oral Daily Raymond Mederos MD   0.4 mg at 04/09/24 0827    timolol (TIMOPTIC) 0.25 % ophthalmic solution 1 drop  1 drop Both Eyes Q12H Raymond Mederos MD   1 drop at 04/09/24 0829    zinc sulfate (ZINCATE) capsule 220 mg  220 mg Oral Daily Raymond Mederos MD   220 mg at 04/09/24 0827       Past Medical History:  Past Medical History:   Diagnosis Date    Arthritis     Autonomic disease     CAD (coronary artery disease) 02/06/2017    Cervical radiculopathy 09/16/2021    Chronic constipation with acute exaccerbation 05/10/2021    Coronary artery disease     Degeneration of cervical intervertebral disc 08/11/2021    Diabetes mellitus     Diabetic foot ulcer 08/31/2020    Diabetic polyneuropathy associated with type 2 diabetes mellitus 01/18/2021    Elevated cholesterol     Gastroesophageal reflux disease 05/13/2019    Headache     HTN (hypertension), benign 05/03/2017    Hyperlipidemia     Hypertension     Mixed hyperlipidemia 02/07/2017    Multiple lung nodules 01/26/2020    5mm, 9 mm RLL identified 1/2020, not present 10/2019.    Myocardial infarction     Osteomyelitis 01/22/2020    Osteomyelitis of fifth toe of right foot 10/07/2019    Pancreatitis     Persistent insomnia 01/20/2020    Renal disorder     Sleep apnea 02/06/2017    Sleep apnea with use of continuous positive airway pressure (CPAP)     NON-COMPLIANT    Slow transit constipation 01/16/2019    Spinal stenosis in cervical region 09/16/2021    Vitamin D deficiency 03/02/2021       Past Surgical History:  Past Surgical History:   Procedure Laterality Date    ABDOMINAL SURGERY      AMPUTATION FOOT / TOE Left 10/2021    5th digit     ANTERIOR CERVICAL DISCECTOMY W/ FUSION N/A 8/5/2022    Procedure: CERVICAL DISCECTOMY ANTERIOR WITH FUSION C5-6 with possible plating of C5-7 with neuromonitoring and 1 c-arm;  Surgeon: Karel Soliz  MD;  Location:  [facility] OR;  Service: Neurosurgery;  Laterality: N/A;    APPENDECTOMY      BACK SURGERY      CARDIAC CATHETERIZATION Left 2021    Procedure: Left Heart Cath w poss intervention left anatomical snuff box acess;  Surgeon: Omkar Charles DO;  Location:  [facility] CATH INVASIVE LOCATION;  Service: Cardiology;  Laterality: Left;    CARDIAC SURGERY      CATARACT EXTRACTION      CERVICAL SPINE SURGERY      COLONOSCOPY N/A 2017    Normal exam repeat in 5 years    COLONOSCOPY N/A 2019    Mild acute inflammation    COLONOSCOPY N/A 2024    Procedure: COLONOSCOPY WITH ANESTHESIA;  Surgeon: Raymond Mederos MD;  Location:  [facility] OR;  Service: Gastroenterology;  Laterality: N/A;  Pre: Anemia, Rectal bleed;  Post: Visible vessel at 20cm, clip x6 placed;  Dle Shetty MD    COLONOSCOPY W/ POLYPECTOMY  2014    Hyperplastic polyp    CORONARY ARTERY BYPASS GRAFT  10/2015    ENDOSCOPY  2011    Gastritis with hemorrhage    ENDOSCOPY N/A 2017    Normal exam    ENDOSCOPY N/A 2019    Gastritis    ENDOSCOPY N/A 2020    Non-erosive gastritis with hemorrhage    ENDOSCOPY N/A 02/10/2021    Esophagitis    FOOT SURGERY Left     INCISION AND DRAINAGE OF WOUND Left 2007    spider bite       Family History  Family History   Problem Relation Age of Onset    Colon cancer Father     Heart disease Father     Colon cancer Sister     Colon polyps Sister     Alzheimer's disease Mother     Coronary artery disease Sister     Coronary artery disease Sister        Social History  Social History     Socioeconomic History    Marital status:    Tobacco Use    Smoking status: Former     Current packs/day: 0.00     Types: Cigarettes     Quit date:      Years since quittin.2    Smokeless tobacco: Never    Tobacco comments:     smoked in highschool   Vaping Use    Vaping status: Never Used   Substance and Sexual Activity    Alcohol use: No    Drug use: No    Sexual  activity: Defer       Review of Systems:  History obtained from chart review and the patient  General ROS: No fever or chills  Respiratory ROS: No cough, shortness of breath, wheezing  Cardiovascular ROS: No chest pain or palpitations  Gastrointestinal ROS: No abdominal pain or melena  Genito-Urinary ROS: No dysuria or hematuria  Psych ROS: No anxiety and depression  14 point ROS reviewed with the patient and negative except as noted above and in the HPI unless unable to obtain.    Objective:  Patient Vitals for the past 24 hrs:   BP Temp Temp src Pulse Resp SpO2   04/09/24 0727 124/58 97.4 °F (36.3 °C) Oral 63 16 100 %   04/09/24 0500 124/62 97.3 °F (36.3 °C) Oral 59 16 99 %   04/08/24 2303 118/48 98.2 °F (36.8 °C) Oral 53 16 97 %   04/08/24 1835 (!) 127/38 97.5 °F (36.4 °C) Oral 74 16 99 %   04/08/24 1600 131/53 97.3 °F (36.3 °C) Oral 63 16 99 %   04/08/24 1152 99/64 98 °F (36.7 °C) Oral 82 16 97 %       Intake/Output Summary (Last 24 hours) at 4/9/2024 1103  Last data filed at 4/9/2024 0932  Gross per 24 hour   Intake 120 ml   Output 750 ml   Net -630 ml     General: awake/alert   Chest:  clear to auscultation bilaterally without respiratory distress  CVS: regular rate and rhythm  Abdominal: soft, nontender, positive bowel sounds  Extremities:  bilateral 1+ edema  Skin: warm and dry without rash      Labs:  Results from last 7 days   Lab Units 04/09/24  0611 04/09/24  0004 04/08/24  1722 04/08/24  1218 04/08/24  0623 04/07/24  1429 04/07/24  0422   WBC 10*3/mm3 4.35  --   --   --  6.19  --  5.70   HEMOGLOBIN g/dL 9.2* 8.4* 7.8*   < > 8.7*   < > 9.2*   HEMATOCRIT % 29.0* 26.5* 24.3*   < > 27.0*   < > 29.5*   PLATELETS 10*3/mm3 231  --   --   --  246  --  275    < > = values in this interval not displayed.         Results from last 7 days   Lab Units 04/09/24  0610 04/08/24  0623 04/07/24  0422 04/06/24  0340 04/05/24  0512   SODIUM mmol/L 141 141 141   < > 139  139   POTASSIUM mmol/L 4.1 4.0 4.0   < > 4.4  4.4    CHLORIDE mmol/L 104 104 103   < > 102  102   CO2 mmol/L 26.0 25.0 27.0   < > 25.0  25.0   BUN mg/dL 28* 30* 33*   < > 31*  31*   CREATININE mg/dL 1.84* 1.61* 1.75*   < > 1.98*  1.98*   CALCIUM mg/dL 9.4 9.1 10.1   < > 11.1*  11.1*   EGFR mL/min/1.73 39.7* 46.6* 42.1*   < > 36.3*  36.3*   BILIRUBIN mg/dL  --  0.4  --   --  0.4   ALK PHOS U/L  --  107  --   --  138*   ALT (SGPT) U/L  --  14  --   --  18   AST (SGOT) U/L  --  16  --   --  22   GLUCOSE mg/dL 170* 82 94   < > 173*  173*    < > = values in this interval not displayed.       Radiology:   Imaging Results (Last 72 Hours)       Procedure Component Value Units Date/Time    NM GI Blood Loss [[id number]] Collected: 04/06/24 1327     Updated: 04/06/24 1334    Narrative:      EXAM: NM GI BLOOD LOSS-     INDICATION: Lower GI bleed (Ped 0-17y)     RADIOPHARMACEUTICAL: Tc-99m labeled red cells 27.6 mCi IV     TECHNICAL: The patient's red blood cells were labeled in vitro and  reinjected.  Sequential five-minute anterior planar images of the  abdomen were obtained for two hours.  These were also reformatted into  cine mode.     COMPARISON: CT abdomen pelvis 7/22/2023     FINDINGS:     No evidence of an active GI bleed.       Impression:         No evidence of an active GI bleed.      This report was signed and finalized on 4/6/2024 1:31 PM by Ronal Wisdom.               Culture:  Blood Culture   Date Value Ref Range Status   03/26/2024 Staphylococcus aureus, MRSA (C)  Final     Comment:       Infectious disease consultation is highly recommended to rule out distant foci of infection.  Methicillin resistant Staphylococcus aureus, Patient may be an isolation risk.   03/26/2024 Staphylococcus aureus, MRSA (C)  Final     Comment:       Infectious disease consultation is highly recommended to rule out distant foci of infection.  Methicillin resistant Staphylococcus aureus, Patient may be an isolation risk.         Assessment    Acute kidney injury,  ATN--resolved  Baseline chronic kidney disease stage 3b  Type 2 diabetes, poorly controlled (A1c 10.8%)  Hypertension   Metabolic encephalopathy--improved  Anemia of CKD  Hypokalemia--improved  Sepsis due to infection of left foot wound  Hypercalcemia--improving    Plan:  Renal function remains around his baseline level  Monitor labs      Stewart Alvarez, APRN  4/9/2024  11:03 CDT

## 2024-04-09 NOTE — PAYOR COMM NOTE
"Erick Luong (68 y.o. Male)   [id number]    cont stay  update 4/9    Please review clinical for additional  days   [facility] phone    fax           Date of Birth   1955    Social Security Number       Address   [address] [place] [address]    Home Phone   [phone]    MRN   [id number]       [place]   [facility]    Marital Status                               Admission Date   3/26/24    Admission Type   Emergency    Admitting Provider   Rene Maloney MD    Attending Provider   Rene Maloney MD    Department, Room/Bed   [facility] 3C, 372/1       Discharge Date       Discharge Disposition       Discharge Destination                                 Attending Provider: Rene Maloney MD    Allergies: Cefepime, Bactrim [Sulfamethoxazole-trimethoprim], Vancomycin, Zolpidem, Zolpidem Tartrate, Metronidazole    Isolation: Contact   Infection: MRSA (05/19/19), COVID (History) (08/08/22)   Code Status: CPR    Ht: 182.9 cm (72\")   Wt: 131 kg (289 lb 3.2 oz)    Admission Cmt: None   Principal Problem: Sepsis [A41.9]                   Active Insurance as of 3/26/2024       Primary Coverage       Payor Plan Insurance Group Employer/Plan Group    [employer] [facility] [employer] EMPLOYEE [id number]       Payor Plan Address Payor Plan Phone Number Payor Plan Fax Number Effective Dates    [address] [phone]  1/1/2022 - None Entered    [facility] [id number]         Subscriber Name Subscriber Birth Date Member ID       ZAINAB LUONG 11/27/1969 [id number]               Secondary Coverage       Payor Plan Insurance Group Employer/Plan Group    MEDICARE MEDICARE A & B        Payor Plan Address Payor Plan Phone Number Payor Plan Fax Number Effective Dates    [address] [phone]  7/1/2013 - None Entered    [facility] [id number]         Subscriber Name Subscriber Birth Date Member ID       ERICK LUONG 1955 " [id number]                     Emergency Contacts        (Rel.) Home Phone Work Phone Mobile Phone    Joan Luong (Spouse) [phone]              Current Facility-Administered Medications   Medication Dose Route Frequency Provider Last Rate Last Admin    acetaminophen (TYLENOL) tablet 650 mg  650 mg Oral Q4H PRN Raymond Mederos MD   650 mg at 04/04/24 0002    Or    acetaminophen (TYLENOL) 160 MG/5ML oral solution 650 mg  650 mg Oral Q4H PRN Raymond Mederos MD   650 mg at 03/27/24 2109    Or    acetaminophen (TYLENOL) suppository 650 mg  650 mg Rectal Q4H PRN Raymond Mederos MD        ascorbic acid (VITAMIN C) tablet 1,000 mg  1,000 mg Oral Daily Raymond Mederos MD   1,000 mg at 04/09/24 0827    sennosides-docusate (PERICOLACE) 8.6-50 MG per tablet 1 tablet  1 tablet Oral BID Raymond Mederos MD   1 tablet at 04/08/24 2107    And    polyethylene glycol (MIRALAX) packet 17 g  17 g Oral Daily Raymond Mederos MD   17 g at 04/06/24 0932    And    bisacodyl (DULCOLAX) EC tablet 5 mg  5 mg Oral Daily PRN Raymond Mederos MD        And    bisacodyl (DULCOLAX) suppository 10 mg  10 mg Rectal Daily PRN Raymond Mederos MD        bumetanide (BUMEX) tablet 2 mg  2 mg Oral Daily Vinny Hartley MD   2 mg at 04/09/24 0827    carvedilol (COREG) tablet 3.125 mg  3.125 mg Oral BID With Meals Rene Maloney MD   3.125 mg at 04/09/24 0827    dextrose (D50W) (25 g/50 mL) IV injection 25 g  25 g Intravenous Q15 Min PRN Raymond Mederos MD        dextrose (GLUTOSE) oral gel 15 g  15 g Oral Q15 Min PRN Raymond Mederos MD   15 g at 03/27/24 1710    Diclofenac Sodium (VOLTAREN) 1 % gel 2 g  2 g Topical 4x Daily PRN Raymond Mederos MD        dicyclomine (BENTYL) capsule 20 mg  20 mg Oral TID PRN Raymond Mederos MD   20 mg at 04/07/24 2050    donepezil (ARICEPT) tablet 10 mg  10 mg Oral Daily Raymond Mederos MD   10 mg at 04/09/24 0828    [Transfer Hold] glucagon (GLUCAGEN) injection 1  mg  1 mg Intramuscular Q15 Min PRN Raquel Duncan, SUSAN        hydrocortisone (ANUSOL-HC) suppository 25 mg  25 mg Rectal BID Raymond Mederos MD   25 mg at 04/09/24 0828    insulin detemir (LEVEMIR) injection 30 Units  30 Units Subcutaneous Nightly Raymond Mederos MD   30 Units at 04/08/24 2106    Insulin Lispro (humaLOG) injection 4-24 Units  4-24 Units Subcutaneous 4x Daily AC & at Bedtime Raymond Mederos MD   4 Units at 04/09/24 0834    insulin regular (humuLIN R,novoLIN R) injection 10 Units  10 Units Subcutaneous TID AC Raymond Mederos MD   10 Units at 04/09/24 0834    isosorbide mononitrate (IMDUR) 24 hr tablet 30 mg  30 mg Oral Q24H Raymond Mederos MD   30 mg at 04/09/24 0828    lactulose solution 20 g  20 g Oral TID Raymond Mederos MD   20 g at 04/09/24 0828    linezolid (ZYVOX) tablet 600 mg  600 mg Oral Q12H Raymond Mederos MD   600 mg at 04/09/24 0828    magnesium hydroxide (MILK OF MAGNESIA) suspension 10 mL  10 mL Oral Daily PRN Raymond Mederos MD   10 mL at 04/03/24 0414    melatonin tablet 6 mg  6 mg Oral Nightly Raymond Mederos MD   6 mg at 04/08/24 2106    midodrine (PROAMATINE) tablet 2.5 mg  2.5 mg Oral TID AC Raymond Mederos MD   2.5 mg at 04/09/24 1226    mupirocin (BACTROBAN) 2 % nasal ointment 1 Application  1 Application Each Nare BID Raymond Mederos MD   1 Application at 04/09/24 0828    nitroglycerin (NITROSTAT) SL tablet 0.4 mg  0.4 mg Sublingual Q5 Min PRN Raymond Mederos MD        ondansetron ODT (ZOFRAN-ODT) disintegrating tablet 4 mg  4 mg Oral Q6H PRN Raymond Mederos MD   4 mg at 04/08/24 2115    Or    ondansetron (ZOFRAN) injection 4 mg  4 mg Intravenous Q6H PRN Raymond Mederos MD   4 mg at 03/29/24 1837    oxyCODONE (ROXICODONE) immediate release tablet 10 mg  10 mg Oral BID PRN Raymond Mederos MD   10 mg at 04/09/24 1039    pantoprazole (PROTONIX) EC tablet 40 mg  40 mg Oral BID AC Rene Maloney MD   40 mg at 04/09/24 0834    pregabalin (LYRICA) capsule 100 mg  100 mg Oral  Nightly Rene Maloney MD   100 mg at 04/08/24 2106    pregabalin (LYRICA) capsule 50 mg  50 mg Oral Daily Rene Maloney MD   50 mg at 04/09/24 0834    rosuvastatin (CRESTOR) tablet 10 mg  10 mg Oral Nightly Raymond Mederos MD   10 mg at 04/08/24 2106    sodium chloride 0.9 % flush 10 mL  10 mL Intravenous PRN Raymond Mederos MD        sodium chloride 0.9 % flush 10 mL  10 mL Intravenous Q12H Raymond Mederos MD   10 mL at 04/07/24 2050    sodium chloride 0.9 % flush 10 mL  10 mL Intravenous PRN Raymond Mederos MD        sodium chloride 0.9 % flush 10 mL  10 mL Intravenous Q12H Tj Hardwick, YAIR        sodium chloride 0.9 % flush 10 mL  10 mL Intravenous PRN Tj Hardwick, CRNA        sodium chloride 0.9 % infusion 40 mL  40 mL Intravenous PRN Raymond Mederos MD        sodium chloride 0.9 % infusion 40 mL  40 mL Intravenous PRN Tj Hardwick, CRNA        sodium chloride 0.9 % infusion  100 mL/hr Intravenous Continuous Tj Hardwick CRNA        sucralfate (CARAFATE) 1 GM/10ML suspension 1 g  1 g Oral TID AC Raymond Mederos MD   1 g at 04/09/24 1226    tamsulosin (FLOMAX) 24 hr capsule 0.4 mg  0.4 mg Oral Daily Raymond Mederos MD   0.4 mg at 04/09/24 0827    timolol (TIMOPTIC) 0.25 % ophthalmic solution 1 drop  1 drop Both Eyes Q12H Raymond Mederos MD   1 drop at 04/09/24 0829    zinc sulfate (ZINCATE) capsule 220 mg  220 mg Oral Daily Raymond Mederos MD   220 mg at 04/09/24 0827        Physician Progress Notes (last 24 hours)        Stewart Alvarez, APRN at 04/09/24 1102          Nephrology ([facility]) Progress Note      Patient:  Erick Luong  YOB: 1955  Date of Service: 4/9/2024  MRN: [id number]   Acct: [id number]   Primary Care Physician: Del Shetty MD  Advance Directive:   Code Status and Medical Interventions:   Ordered at: 03/26/24 3685     Level Of Support Discussed With:    Health Care Surrogate     Code Status (Patient has no pulse and is not  breathing):    CPR (Attempt to Resuscitate)     Medical Interventions (Patient has pulse or is breathing):    Full Support     Admit Date: 3/26/2024       Hospital Day: 14  Referring Provider: No ref. provider found      Patient personally seen and examined.  Complete chart including Consults, Notes, Operative Reports, Labs, Cardiology, and Radiology studies reviewed as able.        Subjective:  Erick Luong is a 68 y.o. male for whom we were consulted for evaluation and treatment of acute kidney injury.  He has stage IIIb chronic kidney disease baseline, follows with Dr Lomas in our office.  Baseline creatinine approximately 1.8. He has history of insulin-dependent type 2 diabetes, hypertension, abdominal obesity, obstructive sleep apnea, diabetic foot ulcer and coronary artery disease.   Patient presented to ER on 3/26 with altered mental status. Had debridement of ongoing wound on left foot the day prior. Left foot warm and erythremic on arrival to ER. Noted to have elevated blood glucose over 400. Initial creatinine of 1.9.  Admitted to medical floor for sepsis due to left foot wound. Patient has been agitated and confused during the admission, requiring wrist restraints due to pulling at lines and tubes. Renal function and mentation have been improving. Now has a sitter present in room due to repeatedly getting up unattended. He is s/p colonoscopy on 4/07 due to rectal bleding.     Today is awake and alert. No new complaint. Some nausea overnight. Urine output nonoliguric     Allergies:  Cefepime, Bactrim [sulfamethoxazole-trimethoprim], Vancomycin, Zolpidem, Zolpidem tartrate, and Metronidazole    Home Meds:  Medications Prior to Admission   Medication Sig Dispense Refill Last Dose    amitriptyline (ELAVIL) 25 MG tablet Take 2 tablets by mouth Daily at bedtime. (Patient not taking: Reported on 3/27/2024) 60 tablet 1 Not Taking    apixaban (ELIQUIS) 5 MG tablet tablet Take 1 tablet by mouth Every 12 (Twelve)  Hours.       ascorbic acid (VITAMIN C) 1000 MG tablet Take 1 tablet by mouth Daily. 30 tablet 3     Aspirin 81 MG capsule Take 81 mg by mouth Daily.       bumetanide (BUMEX) 1 MG tablet Take 1 tablet every day by oral route for 30 days. 30 tablet 0     bumetanide (BUMEX) 2 MG tablet Take 1 tablet by mouth Daily. Take 2 tablets in morning;1 tablet at night (Patient not taking: Reported on 3/27/2024)   Not Taking    busPIRone (BUSPAR) 10 MG tablet Take 1 tablet by mouth 3 (Three) Times a Day.       calcitriol (ROCALTROL) 0.5 MCG capsule Take 1 capsule by mouth Daily. 90 capsule 4     calcitriol (ROCALTROL) 0.5 MCG capsule Take 1 capsule every day by oral route for 90 days. (Patient not taking: Reported on 3/27/2024) 90 capsule 4 Not Taking    carvedilol (COREG) 3.125 MG tablet Take 1 tablet twice a day by oral route. 60 tablet 4     carvedilol (COREG) 6.25 MG tablet Take 1 tablet by mouth 2 (Two) Times a Day. (Patient not taking: Reported on 3/27/2024) 180 tablet 4 Not Taking    Cholecalciferol (D-5000) 125 MCG (5000 UT) tablet Take 1 tablet by mouth Daily Before Lunch. 30 tablet 2     cloNIDine (CATAPRES) 0.1 MG tablet Take 1 tablet by mouth Every 12 (Twelve) Hours. (Patient not taking: Reported on 3/27/2024) 60 tablet 2 Not Taking    Diclofenac Sodium (VOLTAREN) 1 % gel gel Apply 2 g topically to the appropriate area as directed 4 (Four) Times a Day As Needed. 300 g 11     Diclofenac Sodium (VOLTAREN) 1 % gel gel Apply 2 g topically to the appropriate area as directed 4 (Four) Times a Day. (Patient not taking: Reported on 3/27/2024) 300 g 11 Not Taking    donepezil (ARICEPT) 10 MG tablet Take 1 tablet by mouth Daily. (Patient not taking: Reported on 3/27/2024) 90 tablet 4 Not Taking    Dulaglutide (Trulicity) 4.5 MG/0.5ML solution pen-injector Inject 0.5 mL under the skin into the appropriate area as directed 1 (One) Time Per Week. (Patient not taking: Reported on 3/27/2024) 2 mL 11 Not Taking    DULoxetine  (CYMBALTA) 60 MG capsule Take 1 capsule by mouth Daily. 90 capsule 4     DULoxetine (CYMBALTA) 60 MG capsule Take 1 capsule by mouth Daily. (Patient not taking: Reported on 3/27/2024) 90 capsule 4 Not Taking    DULoxetine (CYMBALTA) 60 MG capsule Take 1 capsule by mouth Daily. (Patient not taking: Reported on 3/27/2024) 90 capsule 4 Not Taking    empagliflozin (JARDIANCE) 25 MG tablet tablet Take 1 tablet by mouth Daily. (Patient not taking: Reported on 3/27/2024) 30 tablet 2 Not Taking    famotidine (PEPCID) 20 MG tablet Take 1 tablet twice a day by oral route. 180 tablet 4     fluticasone (FLONASE) 50 MCG/ACT nasal spray 1 spray into the nostril(s) as directed by provider Daily. (Patient taking differently: 1 spray into the nostril(s) as directed by provider 2 (Two) Times a Day.) 16 g 1     HYDROcodone-acetaminophen (NORCO) 7.5-325 MG per tablet Take 1 tablet by mouth 2 (Two) Times a Day As Needed. (Patient not taking: Reported on 3/27/2024) 40 tablet 0 Not Taking    Insulin Glargine (Lantus SoloStar) 100 UNIT/ML injection pen Inject 20 Units under the skin into the appropriate area as directed Every Evening. 15 mL 3     Insulin Regular Human, Conc, (HumuLIN R U-500 KwikPen) 500 UNIT/ML solution pen-injector CONCENTRATED injection Inject 120 Units under the skin into the appropriate area as directed 2 (Two) Times a Day with breakfast and dinner. (Patient not taking: Reported on 3/27/2024) 18 mL 5 Not Taking    Insulin Regular Human, Conc, (HumuLIN R U-500 KwikPen) 500 UNIT/ML solution pen-injector CONCENTRATED injection Inject 40 Units under the skin into the appropriate area with regular meals AND 40 Units with large meals. 54 mL 3     isosorbide mononitrate (IMDUR) 30 MG 24 hr tablet Take 1 tablet by mouth Daily.       magnesium hydroxide (Milk of Magnesia) 400 MG/5ML suspension Take 30 mL every day by oral route for 30 days. 900 mL 0     magnesium hydroxide (Milk of Magnesia) 400 MG/5ML suspension Take 30mL by  mouth once daily for 30 days. (Patient not taking: Reported on 3/27/2024) 900 mL 0 Not Taking    melatonin 3 MG tablet Take 1 tablet by mouth At Night As Needed for Sleep.       methylcellulose (Citrucel) oral powder Mix 5g as directed and drink twice daily for 90 days. 900 g 10     metoclopramide (REGLAN) 5 MG tablet Take 1 tablet by mouth 3 times a day.       midodrine (PROAMATINE) 2.5 MG tablet Take 1 tablet by mouth 3 (Three) Times a Day Before Meals.       nitroglycerin (NITROSTAT) 0.4 MG SL tablet Dissolve 1 tablet by mouth every 5 minutes as needed for chest pain; MAX 3 tabs/24 hours.  If no improvement after 3 tabs go to ER 25 tablet 0     ondansetron (ZOFRAN) 4 MG tablet Take 1 tablet by mouth Every 6 (Six) Hours As Needed for Nausea or Vomiting.       oxyCODONE (ROXICODONE) 10 MG tablet Take 1 tablet by mouth twice a day as needed 60 tablet 0     pantoprazole (Protonix) 40 MG EC tablet Take 1 tablet by mouth 2 (Two) Times a Day. (Patient not taking: Reported on 3/27/2024) 180 tablet 4 Not Taking    polyethylene glycol (MIRALAX) 17 g packet Take 17 g by mouth Daily. Obtain OTC       prazosin (MINIPRESS) 1 MG capsule Take 1 capsule by mouth every night at bedtime. 30 capsule 2     pregabalin (LYRICA) 100 MG capsule Take 2 capsules by mouth Every Night.       pregabalin (LYRICA) 50 MG capsule Take 1 capsule by mouth Daily.       rosuvastatin (CRESTOR) 10 MG tablet Take 1 tablet by mouth Daily.       sennosides-docusate (PERICOLACE) 8.6-50 MG per tablet Take 1 tablet by mouth Every Night. Obtain OTC       sennosides-docusate (PERICOLACE) 8.6-50 MG per tablet Take 1 tablet by mouth every night at bedtime. (Patient not taking: Reported on 3/27/2024) 30 tablet 2 Not Taking    sodium hypochlorite (DAKIN'S 1/4 STRENGTH) 0.125 % solution topical solution 0.125% Apply to affected area twice daily (Patient not taking: Reported on 3/27/2024) 473 mL 3 Not Taking    sodium hypochlorite (DAKIN'S 1/4 STRENGTH) 0.125 %  solution topical solution 0.125% Apply to affected area twice daily as directed (Patient not taking: Reported on 3/27/2024) 473 mL 5 Not Taking    sodium hypochlorite (DAKIN'S) 0.25 % topical solution Use to wound daily as instructed 473 mL 1     sucralfate (CARAFATE) 1 g tablet Take 1 tablet by mouth 3 times a day.       tamsulosin (FLOMAX) 0.4 MG capsule 24 hr capsule Take 1 capsule by mouth Daily. 90 capsule 1     timolol (TIMOPTIC) 0.5 % ophthalmic solution Administer 1 drop to both eyes 2 (Two) Times a Day.       valsartan (DIOVAN) 40 MG tablet Take 1 tablet by mouth Daily.       zinc sulfate (ZINCATE) 50 MG capsule Take 1 capsule by mouth Daily.          Medicines:  Current Facility-Administered Medications   Medication Dose Route Frequency Provider Last Rate Last Admin    acetaminophen (TYLENOL) tablet 650 mg  650 mg Oral Q4H PRN Raymond Mederos MD   650 mg at 04/04/24 0002    Or    acetaminophen (TYLENOL) 160 MG/5ML oral solution 650 mg  650 mg Oral Q4H PRN Raymond Mederos MD   650 mg at 03/27/24 2109    Or    acetaminophen (TYLENOL) suppository 650 mg  650 mg Rectal Q4H PRN Raymond Mederos MD        ascorbic acid (VITAMIN C) tablet 1,000 mg  1,000 mg Oral Daily Raymond Mederos MD   1,000 mg at 04/09/24 0827    sennosides-docusate (PERICOLACE) 8.6-50 MG per tablet 1 tablet  1 tablet Oral BID Raymond Mederos MD   1 tablet at 04/08/24 2107    And    polyethylene glycol (MIRALAX) packet 17 g  17 g Oral Daily Raymond Mederos MD   17 g at 04/06/24 0932    And    bisacodyl (DULCOLAX) EC tablet 5 mg  5 mg Oral Daily PRN Raymond Mederos MD        And    bisacodyl (DULCOLAX) suppository 10 mg  10 mg Rectal Daily PRN Raymond Mederos MD        bumetanide (BUMEX) tablet 2 mg  2 mg Oral Daily Vinny Hartley MD   2 mg at 04/09/24 0827    carvedilol (COREG) tablet 3.125 mg  3.125 mg Oral BID With Meals Ebenibo, Sotonte E, MD   3.125 mg at 04/09/24 0827    dextrose (D50W) (25 g/50 mL) IV injection 25 g  25 g  Intravenous Q15 Min PRN Raymond Mederos MD        dextrose (GLUTOSE) oral gel 15 g  15 g Oral Q15 Min PRN Raymond Mederos MD   15 g at 03/27/24 1710    Diclofenac Sodium (VOLTAREN) 1 % gel 2 g  2 g Topical 4x Daily PRN Raymond Mederos MD        dicyclomine (BENTYL) capsule 20 mg  20 mg Oral TID PRN Raymond Mederos MD   20 mg at 04/07/24 2050    donepezil (ARICEPT) tablet 10 mg  10 mg Oral Daily Raymond Mederos MD   10 mg at 04/09/24 0828    [Transfer Hold] glucagon (GLUCAGEN) injection 1 mg  1 mg Intramuscular Q15 Min PRN Raquel Duncan, SUSAN        hydrocortisone (ANUSOL-HC) suppository 25 mg  25 mg Rectal BID Raymond Mederos MD   25 mg at 04/09/24 0828    insulin detemir (LEVEMIR) injection 30 Units  30 Units Subcutaneous Nightly Raymond Mederos MD   30 Units at 04/08/24 2106    Insulin Lispro (humaLOG) injection 4-24 Units  4-24 Units Subcutaneous 4x Daily AC & at Bedtime Raymond Mederos MD   4 Units at 04/09/24 0834    insulin regular (humuLIN R,novoLIN R) injection 10 Units  10 Units Subcutaneous TID AC Raymond Mederos MD   10 Units at 04/09/24 0834    isosorbide mononitrate (IMDUR) 24 hr tablet 30 mg  30 mg Oral Q24H Raymond Mederos MD   30 mg at 04/09/24 0828    lactulose solution 20 g  20 g Oral TID Raymond Mederos MD   20 g at 04/09/24 0828    linezolid (ZYVOX) tablet 600 mg  600 mg Oral Q12H Raymond Mederos MD   600 mg at 04/09/24 0828    magnesium hydroxide (MILK OF MAGNESIA) suspension 10 mL  10 mL Oral Daily PRN Raymond Mederos MD   10 mL at 04/03/24 0414    melatonin tablet 6 mg  6 mg Oral Nightly Raymond Mederos MD   6 mg at 04/08/24 2106    midodrine (PROAMATINE) tablet 2.5 mg  2.5 mg Oral TID AC Raymnod Mederos MD   2.5 mg at 04/09/24 0827    mupirocin (BACTROBAN) 2 % nasal ointment 1 Application  1 Application Each Nare BID Raymond Mederos MD   1 Application at 04/09/24 0828    nitroglycerin (NITROSTAT) SL tablet 0.4 mg  0.4 mg Sublingual Q5 Min PRN Raymond Mederos MD        ondansetron ODT  (ZOFRAN-ODT) disintegrating tablet 4 mg  4 mg Oral Q6H PRN Raymond Mederos MD   4 mg at 04/08/24 2115    Or    ondansetron (ZOFRAN) injection 4 mg  4 mg Intravenous Q6H PRN Raymond Mederos MD   4 mg at 03/29/24 1837    oxyCODONE (ROXICODONE) immediate release tablet 10 mg  10 mg Oral BID PRN Raymond Mederos MD   10 mg at 04/09/24 1039    pantoprazole (PROTONIX) EC tablet 40 mg  40 mg Oral BID AC Rene Maloney MD   40 mg at 04/09/24 0834    pregabalin (LYRICA) capsule 100 mg  100 mg Oral Nightly Rene Maloney MD   100 mg at 04/08/24 2106    pregabalin (LYRICA) capsule 50 mg  50 mg Oral Daily Rene Maloney MD   50 mg at 04/09/24 0834    rosuvastatin (CRESTOR) tablet 10 mg  10 mg Oral Nightly Raymond Mederos MD   10 mg at 04/08/24 2106    sodium chloride 0.9 % flush 10 mL  10 mL Intravenous PRN Raymond Mederos MD        sodium chloride 0.9 % flush 10 mL  10 mL Intravenous Q12H Raymond Mederos MD   10 mL at 04/07/24 2050    sodium chloride 0.9 % flush 10 mL  10 mL Intravenous PRN Raymond Mederos MD        sodium chloride 0.9 % flush 10 mL  10 mL Intravenous Q12H Tj Hardwick CRNA        sodium chloride 0.9 % flush 10 mL  10 mL Intravenous PRN Tj Hardwick CRNA        sodium chloride 0.9 % infusion 40 mL  40 mL Intravenous PRN Raymond Mederos MD        sodium chloride 0.9 % infusion 40 mL  40 mL Intravenous PRN Tj Hardwick CRNA        sodium chloride 0.9 % infusion  100 mL/hr Intravenous Continuous Tj Hardwick CRNA        sucralfate (CARAFATE) 1 GM/10ML suspension 1 g  1 g Oral TID AC Raymond Mederos MD   1 g at 04/09/24 0828    tamsulosin (FLOMAX) 24 hr capsule 0.4 mg  0.4 mg Oral Daily Raymond Mederos MD   0.4 mg at 04/09/24 0827    timolol (TIMOPTIC) 0.25 % ophthalmic solution 1 drop  1 drop Both Eyes Q12H Raymond Mederos MD   1 drop at 04/09/24 0829    zinc sulfate (ZINCATE) capsule 220 mg  220 mg Oral Daily Raymond Mederos MD   220 mg at 04/09/24 0827       Past Medical History:  Past  Medical History:   Diagnosis Date    Arthritis     Autonomic disease     CAD (coronary artery disease) 02/06/2017    Cervical radiculopathy 09/16/2021    Chronic constipation with acute exaccerbation 05/10/2021    Coronary artery disease     Degeneration of cervical intervertebral disc 08/11/2021    Diabetes mellitus     Diabetic foot ulcer 08/31/2020    Diabetic polyneuropathy associated with type 2 diabetes mellitus 01/18/2021    Elevated cholesterol     Gastroesophageal reflux disease 05/13/2019    Headache     HTN (hypertension), benign 05/03/2017    Hyperlipidemia     Hypertension     Mixed hyperlipidemia 02/07/2017    Multiple lung nodules 01/26/2020    5mm, 9 mm RLL identified 1/2020, not present 10/2019.    Myocardial infarction     Osteomyelitis 01/22/2020    Osteomyelitis of fifth toe of right foot 10/07/2019    Pancreatitis     Persistent insomnia 01/20/2020    Renal disorder     Sleep apnea 02/06/2017    Sleep apnea with use of continuous positive airway pressure (CPAP)     NON-COMPLIANT    Slow transit constipation 01/16/2019    Spinal stenosis in cervical region 09/16/2021    Vitamin D deficiency 03/02/2021       Past Surgical History:  Past Surgical History:   Procedure Laterality Date    ABDOMINAL SURGERY      AMPUTATION FOOT / TOE Left 10/2021    5th digit     ANTERIOR CERVICAL DISCECTOMY W/ FUSION N/A 8/5/2022    Procedure: CERVICAL DISCECTOMY ANTERIOR WITH FUSION C5-6 with possible plating of C5-7 with neuromonitoring and 1 c-arm;  Surgeon: Karel Soliz MD;  Location:  [facility] OR;  Service: Neurosurgery;  Laterality: N/A;    APPENDECTOMY      BACK SURGERY      CARDIAC CATHETERIZATION Left 02/08/2021    Procedure: Left Heart Cath w poss intervention left anatomical snuff box acess;  Surgeon: Omkar Charles DO;  Location:  [facility] CATH INVASIVE LOCATION;  Service: Cardiology;  Laterality: Left;    CARDIAC SURGERY      CATARACT EXTRACTION      CERVICAL SPINE SURGERY      COLONOSCOPY N/A  2017    Normal exam repeat in 5 years    COLONOSCOPY N/A 2019    Mild acute inflammation    COLONOSCOPY N/A 2024    Procedure: COLONOSCOPY WITH ANESTHESIA;  Surgeon: Raymond Mederos MD;  Location: [facility];  Service: Gastroenterology;  Laterality: N/A;  Pre: Anemia, Rectal bleed;  Post: Visible vessel at 20cm, clip x6 placed;  Del Shetty MD    COLONOSCOPY W/ POLYPECTOMY  2014    Hyperplastic polyp    CORONARY ARTERY BYPASS GRAFT  10/2015    ENDOSCOPY  2011    Gastritis with hemorrhage    ENDOSCOPY N/A 2017    Normal exam    ENDOSCOPY N/A 2019    Gastritis    ENDOSCOPY N/A 2020    Non-erosive gastritis with hemorrhage    ENDOSCOPY N/A 02/10/2021    Esophagitis    FOOT SURGERY Left     INCISION AND DRAINAGE OF WOUND Left 2007    spider bite       Family History  Family History   Problem Relation Age of Onset    Colon cancer Father     Heart disease Father     Colon cancer Sister     Colon polyps Sister     Alzheimer's disease Mother     Coronary artery disease Sister     Coronary artery disease Sister        Social History  Social History     Socioeconomic History    Marital status:    Tobacco Use    Smoking status: Former     Current packs/day: 0.00     Types: Cigarettes     Quit date:      Years since quittin.2    Smokeless tobacco: Never    Tobacco comments:     [place]   Vaping Use    Vaping status: Never Used   Substance and Sexual Activity    Alcohol use: No    Drug use: No    Sexual activity: Defer       Review of Systems:  History obtained from chart review and the patient  General ROS: No fever or chills  Respiratory ROS: No cough, shortness of breath, wheezing  Cardiovascular ROS: No chest pain or palpitations  Gastrointestinal ROS: No abdominal pain or melena  Genito-Urinary ROS: No dysuria or hematuria  Psych ROS: No anxiety and depression  14 point ROS reviewed with the patient and negative except as noted above and in  the HPI unless unable to obtain.    Objective:  Patient Vitals for the past 24 hrs:   BP Temp Temp src Pulse Resp SpO2   04/09/24 0727 124/58 97.4 °F (36.3 °C) Oral 63 16 100 %   04/09/24 0500 124/62 97.3 °F (36.3 °C) Oral 59 16 99 %   04/08/24 2303 118/48 98.2 °F (36.8 °C) Oral 53 16 97 %   04/08/24 1835 (!) 127/38 97.5 °F (36.4 °C) Oral 74 16 99 %   04/08/24 1600 131/53 97.3 °F (36.3 °C) Oral 63 16 99 %   04/08/24 1152 99/64 98 °F (36.7 °C) Oral 82 16 97 %       Intake/Output Summary (Last 24 hours) at 4/9/2024 1103  Last data filed at 4/9/2024 0932  Gross per 24 hour   Intake 120 ml   Output 750 ml   Net -630 ml     General: awake/alert   Chest:  clear to auscultation bilaterally without respiratory distress  CVS: regular rate and rhythm  Abdominal: soft, nontender, positive bowel sounds  Extremities:  bilateral 1+ edema  Skin: warm and dry without rash      Labs:  Results from last 7 days   Lab Units 04/09/24  0611 04/09/24  0004 04/08/24  1722 04/08/24  1218 04/08/24  0623 04/07/24  1429 04/07/24  0422   WBC 10*3/mm3 4.35  --   --   --  6.19  --  5.70   HEMOGLOBIN g/dL 9.2* 8.4* 7.8*   < > 8.7*   < > 9.2*   HEMATOCRIT % 29.0* 26.5* 24.3*   < > 27.0*   < > 29.5*   PLATELETS 10*3/mm3 231  --   --   --  246  --  275    < > = values in this interval not displayed.         Results from last 7 days   Lab Units 04/09/24  0610 04/08/24  0623 04/07/24  0422 04/06/24  0340 04/05/24  0512   SODIUM mmol/L 141 141 141   < > 139  139   POTASSIUM mmol/L 4.1 4.0 4.0   < > 4.4  4.4   CHLORIDE mmol/L 104 104 103   < > 102  102   CO2 mmol/L 26.0 25.0 27.0   < > 25.0  25.0   BUN mg/dL 28* 30* 33*   < > 31*  31*   CREATININE mg/dL 1.84* 1.61* 1.75*   < > 1.98*  1.98*   CALCIUM mg/dL 9.4 9.1 10.1   < > 11.1*  11.1*   EGFR mL/min/1.73 39.7* 46.6* 42.1*   < > 36.3*  36.3*   BILIRUBIN mg/dL  --  0.4  --   --  0.4   ALK PHOS U/L  --  107  --   --  138*   ALT (SGPT) U/L  --  14  --   --  18   AST (SGOT) U/L  --  16  --   --  22    GLUCOSE mg/dL 170* 82 94   < > 173*  173*    < > = values in this interval not displayed.       Radiology:   Imaging Results (Last 72 Hours)       Procedure Component Value Units Date/Time    NM GI Blood Loss [[id number]] Collected: 04/06/24 1327     Updated: 04/06/24 1334    Narrative:      EXAM: NM GI BLOOD LOSS-     INDICATION: Lower GI bleed (Ped 0-17y)     RADIOPHARMACEUTICAL: Tc-99m labeled red cells 27.6 mCi IV     TECHNICAL: The patient's red blood cells were labeled in vitro and  reinjected.  Sequential five-minute anterior planar images of the  abdomen were obtained for two hours.  These were also reformatted into  cine mode.     COMPARISON: CT abdomen pelvis 7/22/2023     FINDINGS:     No evidence of an active GI bleed.       Impression:         No evidence of an active GI bleed.      This report was signed and finalized on 4/6/2024 1:31 PM by Ronal Wisdom.               Culture:  Blood Culture   Date Value Ref Range Status   03/26/2024 Staphylococcus aureus, MRSA (C)  Final     Comment:       Infectious disease consultation is highly recommended to rule out distant foci of infection.  Methicillin resistant Staphylococcus aureus, Patient may be an isolation risk.   03/26/2024 Staphylococcus aureus, MRSA (C)  Final     Comment:       Infectious disease consultation is highly recommended to rule out distant foci of infection.  Methicillin resistant Staphylococcus aureus, Patient may be an isolation risk.         Assessment    Acute kidney injury, ATN--resolved  Baseline chronic kidney disease stage 3b  Type 2 diabetes, poorly controlled (A1c 10.8%)  Hypertension   Metabolic encephalopathy--improved  Anemia of CKD  Hypokalemia--improved  Sepsis due to infection of left foot wound  Hypercalcemia--improving    Plan:  Renal function remains around his baseline level  Monitor labs      SUSAN Calvert  4/9/2024  11:03 CDT      Electronically signed by Stewart Alvarez APRN at 04/09/24 1101    "    Julia Adrian MD at 04/09/24 0936          INFECTIOUS DISEASES PROGRESS NOTE    Patient:  Erick Luong  YOB: 1955  MRN: [id number]   Admit date: 3/26/2024   Admitting Physician: Rene Maloney MD  Primary Care Physician: Del Shetty MD    Chief Complaint: None offered.    Interval History: Noted patient did undergo incomplete colonoscopy and report of sigmoid area of bleeding that was clipped.    He indicates he is eating well.    Allergies:   Allergies   Allergen Reactions    Cefepime Hives and Anaphylaxis    Bactrim [Sulfamethoxazole-Trimethoprim] Other (See Comments)     \"RENAL FAILURE\"    Vancomycin Itching    Zolpidem Unknown - High Severity     \"makes him crazy\"    Zolpidem Tartrate Unknown - Low Severity and Provider Review Needed    Metronidazole Rash       Current Scheduled Medications:   ascorbic acid, 1,000 mg, Oral, Daily  bumetanide, 2 mg, Oral, Daily  carvedilol, 3.125 mg, Oral, BID With Meals  donepezil, 10 mg, Oral, Daily  hydrocortisone, 25 mg, Rectal, BID  insulin detemir, 30 Units, Subcutaneous, Nightly  insulin lispro, 4-24 Units, Subcutaneous, 4x Daily AC & at Bedtime  insulin regular, 10 Units, Subcutaneous, TID AC  isosorbide mononitrate, 30 mg, Oral, Q24H  lactulose, 20 g, Oral, TID  linezolid, 600 mg, Oral, Q12H  melatonin, 6 mg, Oral, Nightly  midodrine, 2.5 mg, Oral, TID AC  mupirocin, 1 Application, Each Nare, BID  pantoprazole, 40 mg, Oral, BID AC  senna-docusate sodium, 1 tablet, Oral, BID   And  polyethylene glycol, 17 g, Oral, Daily  pregabalin, 100 mg, Oral, Nightly  pregabalin, 50 mg, Oral, Daily  rosuvastatin, 10 mg, Oral, Nightly  sodium chloride, 10 mL, Intravenous, Q12H  sodium chloride, 10 mL, Intravenous, Q12H  sucralfate, 1 g, Oral, TID AC  tamsulosin, 0.4 mg, Oral, Daily  timolol, 1 drop, Both Eyes, Q12H  zinc sulfate, 220 mg, Oral, Daily      Current PRN Medications:    acetaminophen **OR** acetaminophen **OR** acetaminophen    " "senna-docusate sodium **AND** polyethylene glycol **AND** bisacodyl **AND** bisacodyl    dextrose    dextrose    Diclofenac Sodium    dicyclomine    [Transfer Hold] glucagon (human recombinant)    magnesium hydroxide    nitroglycerin    ondansetron ODT **OR** ondansetron    oxyCODONE    sodium chloride    sodium chloride    sodium chloride    sodium chloride    sodium chloride    sodium chloride, 100 mL/hr           Objective     Vital Signs:  Temp (24hrs), Av.6 °F (36.4 °C), Min:97.3 °F (36.3 °C), Max:98.2 °F (36.8 °C)      /58 (BP Location: Right arm, Patient Position: Sitting)   Pulse 63   Temp 97.4 °F (36.3 °C) (Oral)   Resp 16   Ht 182.9 cm (72\")   Wt 131 kg (289 lb 3.2 oz)   SpO2 100%   BMI 39.22 kg/m²         Physical Exam:  General: The patient is sitting up in the recliner in no acute distress  Abdomen: Soft, nontender, nondistended  Left lower extremity dressing intact.  Appears significant amount of Betadine present.          esults Review:    I reviewed the patient's new clinical results.    Lab Results:    CBC:   Lab Results   Lab 24  0334 24  0514 24  0512 24  0340 24  1344 24  0422 24  1429 24  0623 24  1218 24  1722 24  0004 24  0611   WBC 6.54 5.64 6.93 5.43  --  5.70  --  6.19  --   --   --  4.35   HEMOGLOBIN 9.0* 10.0* 10.9* 9.5*   < > 9.2*   < > 8.7*   < > 7.8* 8.4* 9.2*   HEMATOCRIT 28.9* 31.3* 34.5* 30.2*   < > 29.5*   < > 27.0*   < > 24.3* 26.5* 29.0*   PLATELETS 197 251 321 294  --  275  --  246  --   --   --  231    < > = values in this interval not displayed.        AutoDiff:   Lab Results   Lab 24  0422 24  0623 24  0611   NEUTROPHIL % 55.3 65.7 45.1   LYMPHOCYTE % 29.6 22.1 40.0   MONOCYTES % 9.6 8.7 9.9   EOSINOPHIL % 4.4 2.6 4.1   BASOPHIL % 0.7 0.6 0.7   NEUTROS ABS 3.15 4.06 1.96   LYMPHS ABS 1.69 1.37 1.74   MONOS ABS 0.55 0.54 0.43   EOS ABS 0.25 0.16 0.18   BASOS ABS 0.04 0.04 " 0.03        Manual Diff:    Lab Results   Lab 04/07/24  0422 04/08/24  0623 04/09/24  0611   NEUTROS ABS 3.15 4.06 1.96           CMP:   Lab Results   Lab 04/05/24  0512 04/06/24  0340 04/07/24  0422 04/08/24  0623 04/09/24  0610   SODIUM 139  139   < > 141 141 141   POTASSIUM 4.4  4.4   < > 4.0 4.0 4.1   CHLORIDE 102  102   < > 103 104 104   CO2 25.0  25.0   < > 27.0 25.0 26.0   BUN 31*  31*   < > 33* 30* 28*   CREATININE 1.98*  1.98*   < > 1.75* 1.61* 1.84*   CALCIUM 11.1*  11.1*   < > 10.1 9.1 9.4   BILIRUBIN 0.4  --   --  0.4  --    ALK PHOS 138*  --   --  107  --    ALT (SGPT) 18  --   --  14  --    AST (SGOT) 22  --   --  16  --    GLUCOSE 173*  173*   < > 94 82 170*    < > = values in this interval not displayed.       Estimated Creatinine Clearance: 54.6 mL/min (A) (by C-G formula based on SCr of 1.84 mg/dL (H)).    Culture Results:    Microbiology Results (last 10 days)       Procedure Component Value - Date/Time    Blood Culture With DARSHAN - Blood, Hand, Right [[id number]]  (Normal) Collected: 04/01/24 0402    Lab Status: Final result Specimen: Blood from Hand, Right Updated: 04/06/24 0445     Blood Culture No growth at 5 days               Radiology:       Imaging Results (Last 72 Hours)       Procedure Component Value Units Date/Time    NM GI Blood Loss [[id number]] Collected: 04/06/24 1327     Updated: 04/06/24 1334    Narrative:      EXAM: NM GI BLOOD LOSS-     INDICATION: Lower GI bleed (Ped 0-17y)     RADIOPHARMACEUTICAL: Tc-99m labeled red cells 27.6 mCi IV     TECHNICAL: The patient's red blood cells were labeled in vitro and  reinjected.  Sequential five-minute anterior planar images of the  abdomen were obtained for two hours.  These were also reformatted into  cine mode.     COMPARISON: CT abdomen pelvis 7/22/2023     FINDINGS:     No evidence of an active GI bleed.       Impression:         No evidence of an active GI bleed.      This report was signed and finalized on 4/6/2024 1:31 PM by  Ronal Wisdom.                   Active Hospital Problems    Diagnosis     **Sepsis     Diabetic foot infection     Chronic kidney disease (CKD), stage IV (severe)     Chronic diastolic heart failure     BPH without obstruction/lower urinary tract symptoms     Diabetic polyneuropathy associated with type 2 diabetes mellitus     Anemia due to chronic kidney disease     Essential hypertension     Type 2 diabetes mellitus with hyperglycemia, with long-term current use of insulin        IMPRESSION:    MRSA bacteremia-blood cultures + March 26 and March 29.  Secondary to infected left foot with wounds and associated cellulitis on admission.  Blood cultures negative as of March 30.  Today is day #11/14 of antibiotic therapy with linezolid since first negative blood culture  Chronic kidney disease stage IIIb-nephrology following  Type 2 diabetes mellitus-poorly controlled with hemoglobin A1c of 10.7 this admission  Bright red blood per rectum-colonoscopy with sigmoid bleeding that was clipped.  MRSA nasal screen positive      RECOMMENDATION:   Continue linezolid-changed to p.o. plan to complete a 14-day course.  Hemoglobin monitoring per attending/GI-noted recommendations for fully prepped colonoscopy as outpatient.  Wound care per Dr. Cerrato's orders  Glucose management per attending  Fluid/diuretic management per nephrology.          Julia Adrian MD  04/09/24  09:36 CDT        Electronically signed by Julia Adrian MD at 04/09/24 1021       Rene Maloney MD at 04/08/24 1837              [facility] Medicine Services  INPATIENT PROGRESS NOTE    Patient Name: Erick Luong  Date of Admission: 3/26/2024  Today's Date: 04/08/24  Length of Stay: 13  Primary Care Physician: Del Shetty MD    Subjective   Chief Complaint: Altered mental status  Erick Luong is a 68-year-old male with a past medical history of coronary artery disease, diastolic dysfunction followed by  Dr. Garay, vascular disease, GERD, diabetes, chronic anemia, chronic nonhealing wound to the left foot followed by [facility] wound care.  Patient had an extended hospitalization 8/2023 due to syncope, subsequent admission to [facility] 9/18 - 10/11, 2023.  During that admission he did undergo debridement of Proteus wound infection.     Patient seen by PCP on 3/11/2024, started on prazosin 1 mg daily, duloxetine 60 mg daily magnesium hydroxide 400 mg daily per office note.  Wife is unaware of exactly what patient is taking.  Will need pharmacy to obtain correct med list.     Wife states they were seen by Dylan Pearson, podiatry yesterday who performed debridement on the plantar wound treating with Santyl, is also a area on dorsal aspect of the foot.  Foot is erythemic, warm to touch.  Doctor told patient and wife he was doing well.  There was no signs of infection yesterday.  Today patient was brought to emergency department via EMS for altered mental status with disorientation, glucose was noted to be over 500 in the ambulance.  Initial glucose here 400 followed with 438 treated with regular insulin.  I have taken care of this patient many times.  He awakens for questions.  He is somewhat somnolent.  Remainder workup reveals creatinine 1.9 at baseline, CRP 4.3, lactic acid 3.2 and procalcitonin 6.56.  He does have a white count of 14.6, no bandemia.  Initially afebrile however now 100.7, patient meets criteria for sepsis with tachycardia and tachypnea, elevated lactic acid and source of infection.  There is no organ failure.  Patient is admitted for simple sepsis, condition stabl  3/31   Patient is alert and oriented x 3.  He appears to be in good spirits.  He is requesting for discharge.  He was counseled that we will need to wait until later in this week, after we set up IV antibiotics before we can discharge him.    4/1  Patient was admitted to ER on 3/26 with altered mental status. Had debridement of  ongoing wound on left foot the day prior. Left foot warm and erythremic on arrival to ER. Noted to have elevated blood glucose over 400. Initial creatinine of 1.9.  [facility] nephrology were consulted for acute on chronic renal failure MRI of the left foot did not show any osteomyelitis or abscess.  [facility] ID patient was found to have MRSA bacteremia-blood cultures + March 26 and March 29.  Secondary to infected left foot with wounds and associated cellulitis on admission patient was started on Zyvox plan is for echo today rule out endocarditis given persistent bacteremia holding off on PICC line pending that   4/2  As before history of coronary disease diabetes chronic diabetic foot ulcer noncompliance with A1c 10.4 presented to us with mental status change was found to have MRSA sepsis bacteremia secondary to his infected foot MRI did not show any osteo or abscess has been on Zyvox echo was ordered to rule out endocarditis for persistent bacteremia, podiatry recommends wound care and follow-up with outpatient wound care [facility] ID Currently plan for at least 2 weeks of linezolid with start date after clearance of blood cultures-currently March 30. Should be able to transition to oral linezolid given increased bioavailability. Duration may change based on echocardiogram findings , [facility] nephrology acute on chronic renal failure secondary to ATN resolving  4/3  Remains on Zyvox blood cultures remains negative echo of the heart could not rule out endocarditis poor quality recommending YANIV if endocarditis is a concern we will leave this up to the infectious disease attending [facility] nephrology renal function is getting better, per podiatry continue wound care continue antibiotics MRI is showing no evidence of soft tissue abscess or osteomyelitis podiatry will see as outpatient, patient A1c is 10.8 needs much better control of his diabetes and much better follow-up with his family doctor regarding his  diabetes, and is having stomach distention and bloating last bowel movement was 3 days ago will do KUB and start him on lactulose  4/4  As above ATN is resolving creatinine down to 1.78, reassuring extensive constipation to account for his abdominal pain was started on lactulose and will go ahead and start him on enemas, appreciate ID continue Zyvox I agree with ID given the lack of persistent bacteremia no need for YANIV continue wound care repeat blood culture remains unremarkable from March 30, awaiting SNF, patient said that he did not have any more bowel movements we will go ahead and start him on enemas awaiting SNF  4/5  Appreciate nephrology will hold Bumex today kidney function is slightly worse appreciate ID remains on Zyvox for MRSA bacteremia no need for YANIV continue wound care family still wants to take him home with home health and not interested in rehab but culture from March 30 and February 1 remains unremarkable, and has a sitter last night  4/6  Patient apparently had a massive GI bleed reported his aspirin and Eliquis on hold started Protonix do H&H and consulted GI globin is relatively stable and for colonoscopy for tomorrow , Will also obtain a nuclear tagged red blood cell scan.   4/7  Hemoglobin remained stable off aspirin and Eliquis appreciate GI plan for colonoscopy today admitted for MRSA sepsis secondary to diabetic foot ulcer was seen by podiatry ID on board remains on Zyvox Zyvox has been changed to oral will need 1 more week ATN is resolving needs SNF, going for colonoscopy.    4/8  Patient hemoglobin is stable of aspirin.  Colonoscopy done today was incomplete but did show some bleeding region in the sigmoid colon which was clipped without further bleeding.  Outpatient repeat colonoscopy with proper prep was recommended.    Review of Systems   All pertinent negatives and positives are as above. All other systems have been reviewed and are negative unless otherwise stated.     Objective     Temp:  [97.3 °F (36.3 °C)-98.2 °F (36.8 °C)] 97.3 °F (36.3 °C)  Heart Rate:  [63-82] 63  Resp:  [16] 16  BP: ()/(48-64) 131/53  Physical Exam  Constitutional:       General: He is not in acute distress.     Appearance: He is well-developed. He is not diaphoretic.   HENT:      Head: Normocephalic.   Eyes:      General: No scleral icterus.     Conjunctiva/sclera: Conjunctivae normal.      Pupils: Pupils are equal, round, and reactive to light.   Neck:      Thyroid: No thyromegaly.      Vascular: No JVD.      Trachea: No tracheal deviation.   Cardiovascular:      Rate and Rhythm: Normal rate and regular rhythm.      Heart sounds: Normal heart sounds. No murmur heard.     No friction rub. No gallop.   Pulmonary:      Effort: Pulmonary effort is normal. No respiratory distress.      Breath sounds: Normal breath sounds. No stridor. No wheezing or rales.   Chest:      Chest wall: No tenderness.   Abdominal:      General: Bowel sounds are normal. There is no distension.      Palpations: Abdomen is soft. There is no mass.      Tenderness: There is no abdominal tenderness. There is no guarding or rebound.      Hernia: No hernia is present.   Musculoskeletal:         General: Swelling and signs of injury present. No tenderness or deformity. Normal range of motion.      Cervical back: Normal range of motion and neck supple.      Right lower leg: No edema.      Comments: Dressing over left foot noted.   Lymphadenopathy:      Cervical: No cervical adenopathy.   Skin:     General: Skin is warm and dry.      Coloration: Skin is not pale.      Findings: No erythema or rash.   Neurological:      General: No focal deficit present.      Mental Status: He is alert and oriented to person, place, and time.      Cranial Nerves: No cranial nerve deficit.      Sensory: No sensory deficit.      Motor: No abnormal muscle tone.      Coordination: Coordination normal.   Psychiatric:         Mood and Affect: Mood normal.          Behavior: Behavior normal.            Results Review:  I have reviewed the labs, radiology results, and diagnostic studies.    Laboratory Data:   Results from last 7 days   Lab Units 04/08/24  1722 04/08/24  1218 04/08/24  0623 04/07/24  1429 04/07/24  0422 04/06/24  1344 04/06/24  0340   WBC 10*3/mm3  --   --  6.19  --  5.70  --  5.43   HEMOGLOBIN g/dL 7.8* 8.8* 8.7*   < > 9.2*   < > 9.5*   HEMATOCRIT % 24.3* 27.5* 27.0*   < > 29.5*   < > 30.2*   PLATELETS 10*3/mm3  --   --  246  --  275  --  294    < > = values in this interval not displayed.        Results from last 7 days   Lab Units 04/08/24  0623 04/07/24  0422 04/06/24  0340 04/05/24  0512   SODIUM mmol/L 141 141 141 139  139   POTASSIUM mmol/L 4.0 4.0 4.5 4.4  4.4   CHLORIDE mmol/L 104 103 104 102  102   CO2 mmol/L 25.0 27.0 28.0 25.0  25.0   BUN mg/dL 30* 33* 33* 31*  31*   CREATININE mg/dL 1.61* 1.75* 2.10* 1.98*  1.98*   CALCIUM mg/dL 9.1 10.1 10.7* 11.1*  11.1*   BILIRUBIN mg/dL 0.4  --   --  0.4   ALK PHOS U/L 107  --   --  138*   ALT (SGPT) U/L 14  --   --  18   AST (SGOT) U/L 16  --   --  22   GLUCOSE mg/dL 82 94 86 173*  173*       Culture Data:   Blood Culture   Date Value Ref Range Status   03/26/2024 Abnormal Stain (C)  Preliminary   03/26/2024 Abnormal Stain (C)  Preliminary       Radiology Data:   Imaging Results (Last 24 Hours)       ** No results found for the last 24 hours. **            I have reviewed the patient's current medications.     Assessment/Plan   Assessment  Active Hospital Problems    Diagnosis     **Sepsis     Diabetic foot infection     Chronic kidney disease (CKD), stage IV (severe)     Chronic diastolic heart failure     BPH without obstruction/lower urinary tract symptoms     Diabetic polyneuropathy associated with type 2 diabetes mellitus     Anemia due to chronic kidney disease     Essential hypertension     Type 2 diabetes mellitus with hyperglycemia, with long-term current use of insulin    MRSA  bacteremia  Metabolic encephalopathy secondary to sepsis  WANDER on CKD    Treatment Plan  Continue p.o. Zyvox.  Infectious disease input appreciated.      Nephrology input appreciated.  Creatinine appears to be at baseline.    Gastroenterology input appreciated.  Bleeding area in the sigmoid colon was clipped.  Will discontinue aspirin and Eliquis.  Plan for discharge and repeat colonoscopy with poor prep as outpatient..  Monitor hemoglobin closely every 6 hours.  Transfuse if hemoglobin less than 7G/DL    Podiatry consultation input appreciated.  Will follow recommendations.  Continue wound dressings as per podiatry Follow-up repeat blood cultures.    Nephrology and urology input appreciated.  Hold Bumex due to elevated creatinine and BUN.  No signs of urinary retention at this time.    Pain control with opiate pain medications.    Insulin sliding scale and Levemir for type 2 diabetes mellitus    PT/OT/wound care consultations    DVT prophylaxis with SCDs    CODE STATUS is full code    Medical Decision Making  Number and Complexity of problems: 4 acute, severe, high complexity medical problems.  Multiple chronic medical problems.  Differential Diagnosis: None    Conditions and Status        Condition is worsening.     [facility] Data  External documents reviewed: None  Cardiac tracing (EKG, telemetry) interpretation: None  Radiology interpretation: None  Labs reviewed: CBC, BMP, blood cultures reviewed by me  Any tests that were considered but not ordered: None     Decision rules/scores evaluated (example [id number], Wells, etc): N/A     Discussed with: Patient     Care Planning  Shared decision making: Patient and his wife  Code status and discussions: CODE STATUS is full code    Disposition  Social Determinants of Health that impact treatment or disposition: None  I expect the patient to be discharged to home in 2 to 3 days    Electronically signed by Rene Maloney MD, 04/08/24, 18:38 CDT.     Electronically signed  "by Rene Maloney MD at 04/08/24 1845       Vaibhav Otoole MD at 04/08/24 1547                [facility] Gastroenterology  Inpatient Progress Note  Today's date:  04/08/24    Erick Luong  1955       Reason for Follow Up: GI bleed    Subjective:   Denies any further bleeding.  Does have abdominal discomfort.  Denies any nausea or vomiting.    Allergies   Allergen Reactions    Cefepime Hives and Anaphylaxis    Bactrim [Sulfamethoxazole-Trimethoprim] Other (See Comments)     \"RENAL FAILURE\"    Vancomycin Itching    Zolpidem Unknown - High Severity     \"makes him crazy\"    Zolpidem Tartrate Unknown - Low Severity and Provider Review Needed    Metronidazole Rash       Current Facility-Administered Medications:     acetaminophen (TYLENOL) tablet 650 mg, 650 mg, Oral, Q4H PRN, 650 mg at 04/04/24 0002 **OR** acetaminophen (TYLENOL) 160 MG/5ML oral solution 650 mg, 650 mg, Oral, Q4H PRN, 650 mg at 03/27/24 2109 **OR** acetaminophen (TYLENOL) suppository 650 mg, 650 mg, Rectal, Q4H PRN, Raymond Mederos MD    [Held by provider] apixaban (ELIQUIS) tablet 5 mg, 5 mg, Oral, Q12H, Raquel Duncan, APRN, 5 mg at 04/05/24 0933    ascorbic acid (VITAMIN C) tablet 1,000 mg, 1,000 mg, Oral, Daily, Raymond Mederos MD, 1,000 mg at 04/08/24 0908    [Held by provider] aspirin EC tablet 81 mg, 81 mg, Oral, Daily, Rene Maloney MD, 81 mg at 04/05/24 0933    sennosides-docusate (PERICOLACE) 8.6-50 MG per tablet 1 tablet, 1 tablet, Oral, BID, 1 tablet at 04/07/24 2050 **AND** polyethylene glycol (MIRALAX) packet 17 g, 17 g, Oral, Daily, 17 g at 04/06/24 0932 **AND** bisacodyl (DULCOLAX) EC tablet 5 mg, 5 mg, Oral, Daily PRN **AND** bisacodyl (DULCOLAX) suppository 10 mg, 10 mg, Rectal, Daily PRN, Raymond Mederos MD    bumetanide (BUMEX) tablet 1 mg, 1 mg, Oral, Daily, Willy Arteaga MD, 1 mg at 04/08/24 0908    carvedilol (COREG) tablet 3.125 mg, 3.125 mg, Oral, BID With Meals, Rene Maloney MD, " 3.125 mg at 04/08/24 0908    dextrose (D50W) (25 g/50 mL) IV injection 25 g, 25 g, Intravenous, Q15 Min PRN, Raymond Mederos MD    dextrose (GLUTOSE) oral gel 15 g, 15 g, Oral, Q15 Min PRN, Raymond Mederos MD, 15 g at 03/27/24 1710    Diclofenac Sodium (VOLTAREN) 1 % gel 2 g, 2 g, Topical, 4x Daily PRN, Raymond Mederos MD    dicyclomine (BENTYL) capsule 20 mg, 20 mg, Oral, TID PRN, Raymond Mederos MD, 20 mg at 04/07/24 2050    donepezil (ARICEPT) tablet 10 mg, 10 mg, Oral, Daily, Raymond Mederos MD, 10 mg at 04/08/24 0911    [Transfer Hold] glucagon (GLUCAGEN) injection 1 mg, 1 mg, Intramuscular, Q15 Min PRN, Raquel Duncan, SUSAN    hydrocortisone (ANUSOL-HC) suppository 25 mg, 25 mg, Rectal, BID, Raymond Mederos MD, 25 mg at 04/08/24 0912    insulin detemir (LEVEMIR) injection 30 Units, 30 Units, Subcutaneous, Nightly, Raymond Mederos MD, 30 Units at 04/07/24 2135    Insulin Lispro (humaLOG) injection 4-24 Units, 4-24 Units, Subcutaneous, 4x Daily AC & at Bedtime, Raymond Mederos MD, 8 Units at 04/07/24 2136    insulin regular (humuLIN R,novoLIN R) injection 10 Units, 10 Units, Subcutaneous, TID AC, Raymond Mederos MD, 10 Units at 04/08/24 0919    isosorbide mononitrate (IMDUR) 24 hr tablet 30 mg, 30 mg, Oral, Q24H, Raymond Mederos MD, 30 mg at 04/08/24 0908    lactulose solution 20 g, 20 g, Oral, TID, Raymond Mederos MD, 20 g at 04/08/24 0910    linezolid (ZYVOX) tablet 600 mg, 600 mg, Oral, Q12H, Raymond Mederos MD, 600 mg at 04/08/24 0909    magnesium hydroxide (MILK OF MAGNESIA) suspension 10 mL, 10 mL, Oral, Daily PRN, Raymond Mederos MD, 10 mL at 04/03/24 0414    melatonin tablet 6 mg, 6 mg, Oral, Nightly, Raymond Mederos MD, 6 mg at 04/07/24 2050    midodrine (PROAMATINE) tablet 2.5 mg, 2.5 mg, Oral, TID AC, Raymond Mederos MD, 2.5 mg at 04/08/24 1149    mupirocin (BACTROBAN) 2 % nasal ointment 1 Application, 1 Application, Each Nare, BID, Raymond Mederos MD, 1 Application at 04/08/24 0909     nitroglycerin (NITROSTAT) SL tablet 0.4 mg, 0.4 mg, Sublingual, Q5 Min PRN, Raymond Mederos MD    ondansetron ODT (ZOFRAN-ODT) disintegrating tablet 4 mg, 4 mg, Oral, Q6H PRN, 4 mg at 04/03/24 0537 **OR** ondansetron (ZOFRAN) injection 4 mg, 4 mg, Intravenous, Q6H PRN, Raymond Mederos MD, 4 mg at 03/29/24 1837    oxyCODONE (ROXICODONE) immediate release tablet 10 mg, 10 mg, Oral, BID PRN, Raymond Mederos MD, 10 mg at 04/08/24 0918    pregabalin (LYRICA) capsule 100 mg, 100 mg, Oral, Nightly, Rene Maloney MD, 100 mg at 04/07/24 2050    pregabalin (LYRICA) capsule 50 mg, 50 mg, Oral, Daily, Rene Maloney MD, 50 mg at 04/08/24 0918    rosuvastatin (CRESTOR) tablet 10 mg, 10 mg, Oral, Nightly, Raymond Mederos MD, 10 mg at 04/07/24 2050    sodium chloride 0.9 % flush 10 mL, 10 mL, Intravenous, PRN, Raymond Mederos MD    sodium chloride 0.9 % flush 10 mL, 10 mL, Intravenous, Q12H, Raymond Mederos MD, 10 mL at 04/07/24 2050    sodium chloride 0.9 % flush 10 mL, 10 mL, Intravenous, PRN, Raymond Mederos MD    sodium chloride 0.9 % infusion 40 mL, 40 mL, Intravenous, PRN, Raymond Mederos MD    sucralfate (CARAFATE) 1 GM/10ML suspension 1 g, 1 g, Oral, TID AC, Raymond Mederos MD, 1 g at 04/08/24 1149    tamsulosin (FLOMAX) 24 hr capsule 0.4 mg, 0.4 mg, Oral, Daily, Raymond Mederos MD, 0.4 mg at 04/08/24 0910    timolol (TIMOPTIC) 0.25 % ophthalmic solution 1 drop, 1 drop, Both Eyes, Q12H, Raymond Mederos MD, 1 drop at 04/08/24 0911    zinc sulfate (ZINCATE) capsule 220 mg, 220 mg, Oral, Daily, Raymond Mederos MD, 220 mg at 04/08/24 0909    Review of Systems:   Review of Systems     Vital Signs:  Temp:  [97.4 °F (36.3 °C)-98.2 °F (36.8 °C)] 98 °F (36.7 °C)  Heart Rate:  [67-82] 82  Resp:  [16] 16  BP: ()/(48-64) 99/64  Body mass index is 39.22 kg/m².     Intake/Output Summary (Last 24 hours) at 4/8/2024 1548  Last data filed at 4/8/2024 1100  Gross per 24 hour   Intake 240 ml   Output 500 ml   Net -260 ml      I/O this shift:  In: -   Out: 300 [Urine:300]  Physical Exam:  Physical Exam     Patient appears to be in no apparent distress.  HEENT: Oral mucosa moist, no icterus noted  Heart: S1, S2  Abdomen: Nondistended, nontender  Results Review:   I have reviewed all of the patient's current test results    Results from last 7 days   Lab Units 04/08/24  1218 04/08/24  0623 04/07/24  2333 04/07/24  1429 04/07/24  0422 04/06/24  1344 04/06/24  0340   WBC 10*3/mm3  --  6.19  --   --  5.70  --  5.43   HEMOGLOBIN g/dL 8.8* 8.7* 8.4*   < > 9.2*   < > 9.5*   HEMATOCRIT % 27.5* 27.0* 24.7*   < > 29.5*   < > 30.2*   PLATELETS 10*3/mm3  --  246  --   --  275  --  294    < > = values in this interval not displayed.       Results from last 7 days   Lab Units 04/08/24  0623 04/07/24  0422 04/06/24  0340 04/05/24  0512   SODIUM mmol/L 141 141 141 139  139   POTASSIUM mmol/L 4.0 4.0 4.5 4.4  4.4   CHLORIDE mmol/L 104 103 104 102  102   CO2 mmol/L 25.0 27.0 28.0 25.0  25.0   BUN mg/dL 30* 33* 33* 31*  31*   CREATININE mg/dL 1.61* 1.75* 2.10* 1.98*  1.98*   CALCIUM mg/dL 9.1 10.1 10.7* 11.1*  11.1*   BILIRUBIN mg/dL 0.4  --   --  0.4   ALK PHOS U/L 107  --   --  138*   ALT (SGPT) U/L 14  --   --  18   AST (SGOT) U/L 16  --   --  22   GLUCOSE mg/dL 82 94 86 173*  173*       Results from last 7 days   Lab Units 04/06/24  0340   INR  1.13*       Lab Results   Lab Value Date/Time    LIPASE 190 08/16/2023 1257    LIPASE 22 07/21/2023 2202    LIPASE 77 (H) 02/24/2023 2017    LIPASE 6 (L) 01/19/2023 1824    LIPASE 12 (L) 02/08/2021 2008    LIPASE 60 01/21/2020 2245    LIPASE 72 (H) 01/17/2020 2312    LIPASE 38 10/27/2019 1656    LIPASE 14 10/12/2019 0321    LIPASE 38 03/05/2019 0544    LIPASE 25 02/08/2019 0631    LIPASE 16 (L) 02/07/2019 1742    LIPASE 38 01/09/2019 1120    LIPASE 42 05/23/2017 0532    LIPASE 76 04/23/2017 2003    LIPASE 197 04/13/2017 1702    LIPASE 60 03/29/2017 0602    LIPASE 115 03/28/2017 0352    LIPASE 603 (H)  03/27/2017 0003    LIPASE 108 12/22/2016 0536    LIPASE 257 (H) 12/21/2016 0256    LIPASE 29 09/19/2016 1802    LIPASE 11 (L) 08/29/2016 1923    LIPASE 264 (H) 06/11/2015 0144    LIPASE 120 02/28/2014 1558    LIPASE 57 02/27/2014 1110       Radiology Review:  Imaging Results (Last 24 Hours)       ** No results found for the last 24 hours. **            Impression/Plan:    Sepsis    Essential hypertension    Type 2 diabetes mellitus with hyperglycemia, with long-term current use of insulin    Anemia due to chronic kidney disease    Diabetic polyneuropathy associated with type 2 diabetes mellitus    BPH without obstruction/lower urinary tract symptoms    Chronic diastolic heart failure    Chronic kidney disease (CKD), stage IV (severe)    Diabetic foot infection    68-year-old male admitted for GI bleed and status post incomplete colonoscopy but with evidence of source of bleeding in the sigmoid colon status post hemoclipping with no further bleeding.  Given the fact that the colonoscopy was incomplete, I have stressed to him about the importance of close follow-up with outpatient GI for a full colonoscopy.  Depending on his hemoglobin, he may benefit from upper endoscopy as well.  Importance of follow-up in outpatient setting stressed and he verbalized understanding.  Further recommendations based on the above.  Repeat an H&H in 1 to 2 weeks.  Follow-up with GI in 2 weeks.      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  Any results not available at time of inpatient discharge and/or GI clinic follow up should be managed by the hospitalist team, PCP, or outpatient gastroenterologist.        Electronically signed by Vaibhav Otoole MD at 04/08/24 1721       Adelaida Vines APRN at 04/08/24 1336       Attestation signed by Asad Cerrato DPM  at 04/08/24 1451    I have reviewed this documentation and agree.                      [facility] - PODIATRY    Today's Date: 04/08/24     Patient Name: Erick Luong  MRN: [id number]  CSN: [id number]  PCP: Del Shetty MD  Referring Provider: No ref. provider found  Attending Provider: Rene Maloney MD  Length of Stay: 13    SUBJECTIVE   Chief Complaint: Left foot wounds    HPI: Erick Luong, a 68 y.o.male, who was admitted on 3/26/2024 and podiatry consult was made for left foot wounds.      Patient is up in bedside chair and alert today. He denies any significant overnight events.      Past Medical History:   Diagnosis Date    Arthritis     Autonomic disease     CAD (coronary artery disease) 02/06/2017    Cervical radiculopathy 09/16/2021    Chronic constipation with acute exaccerbation 05/10/2021    Coronary artery disease     Degeneration of cervical intervertebral disc 08/11/2021    Diabetes mellitus     Diabetic foot ulcer 08/31/2020    Diabetic polyneuropathy associated with type 2 diabetes mellitus 01/18/2021    Elevated cholesterol     Gastroesophageal reflux disease 05/13/2019    Headache     HTN (hypertension), benign 05/03/2017    Hyperlipidemia     Hypertension     Mixed hyperlipidemia 02/07/2017    Multiple lung nodules 01/26/2020    5mm, 9 mm RLL identified 1/2020, not present 10/2019.    Myocardial infarction     Osteomyelitis 01/22/2020    Osteomyelitis of fifth toe of right foot 10/07/2019    Pancreatitis     Persistent insomnia 01/20/2020    Renal disorder     Sleep apnea 02/06/2017    Sleep apnea with use of continuous positive airway pressure (CPAP)     NON-COMPLIANT    Slow transit constipation 01/16/2019    Spinal stenosis in cervical region 09/16/2021    Vitamin D deficiency 03/02/2021     Past Surgical History:   Procedure Laterality Date    ABDOMINAL SURGERY      AMPUTATION FOOT / TOE Left 10/2021    5th digit     ANTERIOR CERVICAL DISCECTOMY W/ FUSION N/A 8/5/2022     Procedure: CERVICAL DISCECTOMY ANTERIOR WITH FUSION C5-6 with possible plating of C5-7 with neuromonitoring and 1 c-arm;  Surgeon: Karel Soliz MD;  Location:  [facility] OR;  Service: Neurosurgery;  Laterality: N/A;    APPENDECTOMY      BACK SURGERY      CARDIAC CATHETERIZATION Left 2021    Procedure: Left Heart Cath w poss intervention left anatomical snuff box acess;  Surgeon: Omkar Charles DO;  Location:  [facility] CATH INVASIVE LOCATION;  Service: Cardiology;  Laterality: Left;    CARDIAC SURGERY      CATARACT EXTRACTION      CERVICAL SPINE SURGERY      COLONOSCOPY N/A 2017    Normal exam repeat in 5 years    COLONOSCOPY N/A 2019    Mild acute inflammation    COLONOSCOPY N/A 2024    Procedure: COLONOSCOPY WITH ANESTHESIA;  Surgeon: Raymond Mederos MD;  Location:  [facility] OR;  Service: Gastroenterology;  Laterality: N/A;  Pre: Anemia, Rectal bleed;  Post: Visible vessel at 20cm, clip x6 placed;  Del Shetty MD    COLONOSCOPY W/ POLYPECTOMY  2014    Hyperplastic polyp    CORONARY ARTERY BYPASS GRAFT  10/2015    ENDOSCOPY  2011    Gastritis with hemorrhage    ENDOSCOPY N/A 2017    Normal exam    ENDOSCOPY N/A 2019    Gastritis    ENDOSCOPY N/A 2020    Non-erosive gastritis with hemorrhage    ENDOSCOPY N/A 02/10/2021    Esophagitis    FOOT SURGERY Left     INCISION AND DRAINAGE OF WOUND Left 2007    spider bite     Family History   Problem Relation Age of Onset    Colon cancer Father     Heart disease Father     Colon cancer Sister     Colon polyps Sister     Alzheimer's disease Mother     Coronary artery disease Sister     Coronary artery disease Sister      Social History     Socioeconomic History    Marital status:    Tobacco Use    Smoking status: Former     Current packs/day: 0.00     Types: Cigarettes     Quit date:      Years since quittin.2    Smokeless tobacco: Never    Tobacco comments:     smoked in [employer]   Vaping  "Use    Vaping status: Never Used   Substance and Sexual Activity    Alcohol use: No    Drug use: No    Sexual activity: Defer     Allergies   Allergen Reactions    Cefepime Hives and Anaphylaxis    Bactrim [Sulfamethoxazole-Trimethoprim] Other (See Comments)     \"RENAL FAILURE\"    Vancomycin Itching    Zolpidem Unknown - High Severity     \"makes him crazy\"    Zolpidem Tartrate Unknown - Low Severity and Provider Review Needed    Metronidazole Rash     Current Facility-Administered Medications   Medication Dose Route Frequency Provider Last Rate Last Admin    acetaminophen (TYLENOL) tablet 650 mg  650 mg Oral Q4H PRN Raymond Mederos MD   650 mg at 04/04/24 0002    Or    acetaminophen (TYLENOL) 160 MG/5ML oral solution 650 mg  650 mg Oral Q4H PRN Raymond Mederos MD   650 mg at 03/27/24 2109    Or    acetaminophen (TYLENOL) suppository 650 mg  650 mg Rectal Q4H PRN Raymond Mederos MD        [Held by provider] apixaban (ELIQUIS) tablet 5 mg  5 mg Oral Q12H Raquel Duncan APRN   5 mg at 04/05/24 0933    ascorbic acid (VITAMIN C) tablet 1,000 mg  1,000 mg Oral Daily Raymond Mederos MD   1,000 mg at 04/08/24 0908    [Held by provider] aspirin EC tablet 81 mg  81 mg Oral Daily Rene Maloney MD   81 mg at 04/05/24 0933    sennosides-docusate (PERICOLACE) 8.6-50 MG per tablet 1 tablet  1 tablet Oral BID Raymond Mederos MD   1 tablet at 04/07/24 2050    And    polyethylene glycol (MIRALAX) packet 17 g  17 g Oral Daily Raymond Mederos MD   17 g at 04/06/24 0932    And    bisacodyl (DULCOLAX) EC tablet 5 mg  5 mg Oral Daily PRN Raymond Mederos MD        And    bisacodyl (DULCOLAX) suppository 10 mg  10 mg Rectal Daily PRN Raymond Mederos MD        bumetanide (BUMEX) tablet 1 mg  1 mg Oral Daily Willy Arteaga MD   1 mg at 04/08/24 0908    carvedilol (COREG) tablet 3.125 mg  3.125 mg Oral BID With Meals Rene Maloney MD   3.125 mg at 04/08/24 0908    dextrose (D50W) (25 g/50 mL) IV injection 25 g  25 g " Intravenous Q15 Min PRN Raymond Mederos MD        dextrose (GLUTOSE) oral gel 15 g  15 g Oral Q15 Min PRN Raymond Mederos MD   15 g at 03/27/24 1710    Diclofenac Sodium (VOLTAREN) 1 % gel 2 g  2 g Topical 4x Daily PRN Raymond Mederos MD        dicyclomine (BENTYL) capsule 20 mg  20 mg Oral TID PRN Raymond Mederos MD   20 mg at 04/07/24 2050    donepezil (ARICEPT) tablet 10 mg  10 mg Oral Daily Raymond Mederos MD   10 mg at 04/08/24 0911    [Transfer Hold] glucagon (GLUCAGEN) injection 1 mg  1 mg Intramuscular Q15 Min PRN Raquel Duncan, APRN        hydrocortisone (ANUSOL-HC) suppository 25 mg  25 mg Rectal BID Raymond Mederos MD   25 mg at 04/08/24 0912    insulin detemir (LEVEMIR) injection 30 Units  30 Units Subcutaneous Nightly Raymond Mederos MD   30 Units at 04/07/24 2135    Insulin Lispro (humaLOG) injection 4-24 Units  4-24 Units Subcutaneous 4x Daily AC & at Bedtime Raymond Mederos MD   8 Units at 04/07/24 2136    insulin regular (humuLIN R,novoLIN R) injection 10 Units  10 Units Subcutaneous TID AC Raymond Mederos MD   10 Units at 04/08/24 0919    isosorbide mononitrate (IMDUR) 24 hr tablet 30 mg  30 mg Oral Q24H Raymond Mederos MD   30 mg at 04/08/24 0908    lactulose solution 20 g  20 g Oral TID Raymond Mederos MD   20 g at 04/08/24 0910    linezolid (ZYVOX) tablet 600 mg  600 mg Oral Q12H Raymond Mederos MD   600 mg at 04/08/24 0909    magnesium hydroxide (MILK OF MAGNESIA) suspension 10 mL  10 mL Oral Daily PRN Raymond Mederos MD   10 mL at 04/03/24 0414    melatonin tablet 6 mg  6 mg Oral Nightly Raymond Mederos MD   6 mg at 04/07/24 2050    midodrine (PROAMATINE) tablet 2.5 mg  2.5 mg Oral TID AC Raymond Mederos MD   2.5 mg at 04/08/24 1149    mupirocin (BACTROBAN) 2 % nasal ointment 1 Application  1 Application Each Nare BID Raymond Mederos MD   1 Application at 04/08/24 0909    nitroglycerin (NITROSTAT) SL tablet 0.4 mg  0.4 mg Sublingual Q5 Min PRN Raymond Mederos MD        ondansetron ODT  (ZOFRAN-ODT) disintegrating tablet 4 mg  4 mg Oral Q6H PRN Raymond Mederos MD   4 mg at 04/03/24 0537    Or    ondansetron (ZOFRAN) injection 4 mg  4 mg Intravenous Q6H PRN Raymond Mederos MD   4 mg at 03/29/24 1837    oxyCODONE (ROXICODONE) immediate release tablet 10 mg  10 mg Oral BID PRN Raymond Mederos MD   10 mg at 04/08/24 0918    pregabalin (LYRICA) capsule 100 mg  100 mg Oral Nightly Rene Maloney MD   100 mg at 04/07/24 2050    pregabalin (LYRICA) capsule 50 mg  50 mg Oral Daily Rene Maloney MD   50 mg at 04/08/24 0918    rosuvastatin (CRESTOR) tablet 10 mg  10 mg Oral Nightly Raymond Mederos MD   10 mg at 04/07/24 2050    sodium chloride 0.9 % flush 10 mL  10 mL Intravenous PRN Raymond Mederos MD        sodium chloride 0.9 % flush 10 mL  10 mL Intravenous Q12H Raymond Mederos MD   10 mL at 04/07/24 2050    sodium chloride 0.9 % flush 10 mL  10 mL Intravenous PRN Raymond Mederos MD        sodium chloride 0.9 % infusion 40 mL  40 mL Intravenous PRN Raymond Mederos MD        sucralfate (CARAFATE) 1 GM/10ML suspension 1 g  1 g Oral TID AC Raymond Mederos MD   1 g at 04/08/24 1149    tamsulosin (FLOMAX) 24 hr capsule 0.4 mg  0.4 mg Oral Daily Raymond Mederos MD   0.4 mg at 04/08/24 0910    timolol (TIMOPTIC) 0.25 % ophthalmic solution 1 drop  1 drop Both Eyes Q12H Raymond Mederos MD   1 drop at 04/08/24 0911    zinc sulfate (ZINCATE) capsule 220 mg  220 mg Oral Daily Raymond Mederos MD   220 mg at 04/08/24 0909     Review of Systems   Constitutional:  Negative for activity change.   HENT:  Negative for congestion.    Respiratory:  Negative for apnea and shortness of breath.    Gastrointestinal:  Negative for abdominal pain.   Musculoskeletal:  Positive for arthralgias. Negative for back pain.   Skin:  Positive for wound.   Neurological:  Positive for numbness.   Psychiatric/Behavioral:  Negative for agitation, behavioral problems and confusion.        OBJECTIVE     Vitals:    04/08/24 1152   BP:  99/64   Pulse: 82   Resp: 16   Temp: 98 °F (36.7 °C)   SpO2: 97%       PHYSICAL EXAM  GEN:   Pt presents up in bedside chair. Accompanied by none.     Foot/Ankle Exam    GENERAL  Appearance:  appears stated age  Orientation:  AAOx3  Affect:  appropriate    VASCULAR     Right Foot Vascularity   Dorsalis pedis:  2+  Posterior tibial:  2+  Skin temperature:  warm  Edema grading:  Trace  CFT:  3  Varicosities:  none     Left Foot Vascularity   Posterior tibial:  2+  Skin temperature:  warm  Edema grading:  Trace  CFT:  3  Pedal hair growth:  Absent  Varicosities:  none     NEUROLOGIC     Right Foot Neurologic   Light touch sensation: diminished  Vibratory sensation: diminished  Hot/Cold sensation: diminished     Left Foot Neurologic   Light touch sensation: diminished  Vibratory sensation: diminished  Hot/Cold sensation:  diminished    MUSCULOSKELETAL     Right Foot Musculoskeletal   Tenderness:  none       Left Foot Musculoskeletal    Amputation   Toes amputated: fifth toe  Tenderness:  none    MUSCLE STRENGTH     Right Foot Muscle Strength   Foot dorsiflexion:  5  Foot plantar flexion:  5  Foot inversion:  5  Foot eversion:  5     Left Foot Muscle Strength   Foot dorsiflexion:  4+  Foot plantar flexion:  4+  Foot inversion:  4+  Foot eversion:  4+    DERMATOLOGIC      Right Foot Dermatologic   Skin  Positive for wound.      Left Foot Dermatologic   Skin  Positive for wound.      Left foot additional comments: Multiple wounds  See photos    Image:                            RADIOLOGY/NUCLEAR:  NM GI Blood Loss    Result Date: 4/6/2024  Narrative: EXAM: NM GI BLOOD LOSS-  INDICATION: Lower GI bleed (Ped 0-17y)  RADIOPHARMACEUTICAL: Tc-99m labeled red cells 27.6 mCi IV  TECHNICAL: The patient's red blood cells were labeled in vitro and reinjected.  Sequential five-minute anterior planar images of the abdomen were obtained for two hours.  These were also reformatted into cine mode.  COMPARISON: CT abdomen pelvis 7/22/2023   FINDINGS:  No evidence of an active GI bleed.      Impression:  No evidence of an active GI bleed.  This report was signed and finalized on 4/6/2024 1:31 PM by Ronal Wisdom.      XR Abdomen KUB    Result Date: 4/3/2024  Narrative: EXAMINATION: XR ABDOMEN KUB- 4/3/2024 1:45 PM  HISTORY: abdominal distention; E11.628-Type 2 diabetes mellitus with other skin complications; L08.9-Local infection of the skin and subcutaneous tissue, unspecified; E11.65-Type 2 diabetes mellitus with hyperglycemia; Z74.09-Other reduced mobility.  REPORT: Supine imaging of the abdomen was performed.  COMPARISON: KUB 10/7/2023.  A large amount of stool seen within the transverse colon and flexures, likely representing constipation. No evidence of bowel obstruction is identified. There are no definite urinary tract calculi. Cholecystectomy clips are present. No acute osseous abnormality is identified.      Impression: Extensive constipation within the transverse colon and flexures.  This report was signed and finalized on 4/3/2024 1:46 PM by Dr. Percy Cesar MD.      Adult Transthoracic Echo Complete W/ Cont if Necessary Per Protocol    Result Date: 4/2/2024  Narrative:   The study is technically difficult for diagnosis.  If there is concern for possible infective endocarditis, recommend transesophageal echocardiogram to better visualize the valves.   Left ventricular systolic function is normal. Left ventricular ejection fraction appears to be 61 - 65%.   Left ventricular wall thickness is consistent with mild concentric hypertrophy   Left ventricular diastolic function is consistent with (grade III w/high LAP) fixed restrictive pattern.   Mildly reduced right ventricular systolic function noted.   The left atrial cavity is mildly dilated.   There is mild calcification of the aortic valve. No aortic valve regurgitation is present. No hemodynamically significant aortic valve stenosis is present.     MRI Foot Left With & Without  Contrast    Result Date: 3/29/2024  Narrative: EXAMINATION: MRI FOOT LEFT W WO CONTRAST-  3/29/2024 4:30 PM  HISTORY: Foot swelling, diabetic, osteomyelitis suspected, xray done; E11.628-Type 2 diabetes mellitus with other skin complications; L08.9-Local infection of the skin and subcutaneous tissue, unspecified; E11.65-Type 2 diabetes mellitus with hyperglycemia; Z74.09-Other reduced mobility  LEFT foot MRI without and with IV gadolinium contrast. Axial, sagittal, and coronal sequences.  COMPARISON: LEFT foot x-rays from 3/26/2024.  There is a tiny metal foreign body within the plantar soft tissues adjacent to the second toe and despite the small size it produces a prominent amount of artifact related to image distortion.  I see no soft tissue abscess.  No discrete bone edema or bone enhancement is seen to indicate osteomyelitis.      Impression: 1. There are some limitations to the study based on metal related artifact. 2. No soft tissue abscess or definite bone marrow edema or enhancement is seen to indicate osteomyelitis.    This report was signed and finalized on 3/29/2024 6:58 PM by Dr. Gomez Mcgill MD.      XR Foot 3+ View Left    Result Date: 3/26/2024  Narrative: EXAMINATION: XR FOOT 3+ VW LEFT-  3/26/2024 4:19 PM  HISTORY: foot infection  3 view LEFT foot exam.  Previous amputation of the mid/distal fifth metatarsal.  Interval resection or resorption of the fourth metatarsal head.  No bone destruction or soft tissue air is seen.  High-grade degenerative disease at the first metatarsal phalangeal joint.  Unchanged appearance of prominent dorsal midfoot spurring and prominent inferior calcaneal spurring.  Mild soft tissue edema over the dorsum of the foot is less prominent as compared with the previous exam.      Impression: 1. No bone destruction or soft tissue air is seen. 2. Prominent degenerative spurring.    This report was signed and finalized on 3/26/2024 5:25 PM by Dr. Gomez Mcgill MD.      XR  Chest 1 View    Result Date: 3/26/2024  Narrative: EXAM: XR CHEST 1 VW-  DATE: 3/26/2024 1:15 PM  HISTORY: ams   COMPARISON: 8/28/2023.  TECHNIQUE:  Frontal view(s) of the chest submitted.  FINDINGS:  Patient is status post median sternotomy and CABG. There is enlargement of the cardiac silhouette. There are low lung volumes with vascular crowding versus mild volume overload. No effusion or pneumothorax is seen.       Impression:  1. Postoperative chest and low lung volumes with vascular crowding versus mild volume overload.  This report was signed and finalized on 3/26/2024 2:34 PM by Eran Christina.      CT Head Without Contrast    Result Date: 3/26/2024  Narrative: EXAM: CT HEAD WO CONTRAST-  DATE: 3/26/2024 1:03 PM  HISTORY: ams   COMPARISON: 10/10/2023.  DOSE LENGTH PRODUCT: 876.8 mGy.cm  Automated exposure control was also utilized to decrease patient radiation dose.  TECHNIQUE: Unenhanced CT images obtained from vertex to skull base with multiplanar reformats.  FINDINGS: There is no acute intracranial hemorrhage, midline shift, mass effect, or hydrocephalus. There is no CT evidence for acute infarct.  There are chronic changes with volume loss and chronic small vessel ischemic change of the periventricular white matter.  SOFT TISSUES: The scalp soft tissues are unremarkable.   SINUS: There is partially imaged mucosal thickening at the right maxillary sinus.  ORBITS: The visualized orbits and globes are unremarkable. There is bilateral lens extraction.       Impression: 1. Chronic changes and no acute intracranial findings.  This report was signed and finalized on 3/26/2024 2:10 PM by Eran Christina.       LABORATORY/CULTURE RESULTS:  Results from last 7 days   Lab Units 04/08/24  1218 04/08/24  0623 04/07/24  2333 04/07/24  1429 04/07/24  0422 04/06/24  1344 04/06/24  0340   WBC 10*3/mm3  --  6.19  --   --  5.70  --  5.43   HEMOGLOBIN g/dL 8.8* 8.7* 8.4*   < > 9.2*   < > 9.5*   HEMATOCRIT % 27.5* 27.0*  24.7*   < > 29.5*   < > 30.2*   PLATELETS 10*3/mm3  --  246  --   --  275  --  294    < > = values in this interval not displayed.     Results from last 7 days   Lab Units 04/08/24  0623 04/07/24  0422 04/06/24  0340 04/05/24  0512   SODIUM mmol/L 141 141 141 139  139   POTASSIUM mmol/L 4.0 4.0 4.5 4.4  4.4   CHLORIDE mmol/L 104 103 104 102  102   CO2 mmol/L 25.0 27.0 28.0 25.0  25.0   BUN mg/dL 30* 33* 33* 31*  31*   CREATININE mg/dL 1.61* 1.75* 2.10* 1.98*  1.98*   CALCIUM mg/dL 9.1 10.1 10.7* 11.1*  11.1*   BILIRUBIN mg/dL 0.4  --   --  0.4   ALK PHOS U/L 107  --   --  138*   ALT (SGPT) U/L 14  --   --  18   AST (SGOT) U/L 16  --   --  22   GLUCOSE mg/dL 82 94 86 173*  173*     Results from last 7 days   Lab Units 04/06/24  0340   INR  1.13*   APTT seconds 39.2*     Microbiology Results (last 10 days)       Procedure Component Value - Date/Time    Blood Culture With DARSHAN - Blood, Hand, Right [[id number]]  (Normal) Collected: 04/01/24 0402    Lab Status: Final result Specimen: Blood from Hand, Right Updated: 04/06/24 0445     Blood Culture No growth at 5 days    Blood Culture With DARSHAN - Blood, Arm, Left [[id number]]  (Normal) Collected: 03/30/24 0620    Lab Status: Final result Specimen: Blood from Arm, Left Updated: 04/04/24 0645     Blood Culture No growth at 5 days            PATHOLOGY RESULTS:       ASSESSMENT/PLAN   Diabetic foot ulcerations to left foot  Type 2 diabetes mellitus with hyperglycemia  Diabetic polyneuropathy  Sepsis   Metabolic encephalopathy secondary to sepsis-improving    Continue wound care twice daily with Betadine soaked gauze wet-to-dry to be changed by nursing.    To continue antibiotic therapy per infectious disease.    MRI impression shows no soft tissue abscess, no definite bone marrow edema, and no enhancement seen to indicate osteomyelitis at this time.    Patient to follow up with Dr. Gomes at [facility] [place] [facility] upon discharge.     Will continue to follow patient while  admitted.       This document has been electronically signed by SUSAN Perez on April 8, 2024 13:37 CDT            Electronically signed by Asad Cerrato DPM at 04/08/24 [id number]

## 2024-04-09 NOTE — PLAN OF CARE
Goal Outcome Evaluation:  Plan of Care Reviewed With: patient        Progress: improving  Outcome Evaluation: OT tx completed. Pt seated in recliner upon therapist arrival; A&Ox4; c/o 8/10 abdominal pain. Pt required Max A to dong CAM boot onto LLE. Pt performed all sit<>stand transfers from chair and toilet utilizing rwx and/or grab bar with CGA. Pt ambulated from chair<>BR utilizing rwx with CGA. Pt required Max A to perform posterior poncho hygiene during toilet task. Pt performed hand and oral hygiene while standing unsupported at sink with CGA. Pt demos improvement in ability to appropriately sequence through self care tasks on this date. Pt continues to benefit from skilled OT intervention in order to address remaining deficits in fxl mobility, fxl activity tolerance, balance, coordination, strength, and use of adaptive techniques/equipment during performance of BADLs. Recommend SNF at discharge.      Anticipated Discharge Disposition (OT): skilled nursing facility

## 2024-04-09 NOTE — PROGRESS NOTES
[facility] - PODIATRY    Today's Date: 04/09/24     Patient Name: Erick Luong  MRN: [id number]  CSN: [id number]  PCP: Del Shetty MD  Referring Provider: No ref. provider found  Attending Provider: Rene Maloney MD  Length of Stay: 14    SUBJECTIVE   Chief Complaint: Left foot wounds    HPI: Erick Luong, a 68 y.o.male, who was admitted on 3/26/2024 and podiatry consult was made for left foot wounds.  Left foot dressing changed today.     Patient is up in bedside chair and alert today. He denies any significant overnight events. Denies pain at this time.       Past Medical History:   Diagnosis Date    Arthritis     Autonomic disease     CAD (coronary artery disease) 02/06/2017    Cervical radiculopathy 09/16/2021    Chronic constipation with acute exaccerbation 05/10/2021    Coronary artery disease     Degeneration of cervical intervertebral disc 08/11/2021    Diabetes mellitus     Diabetic foot ulcer 08/31/2020    Diabetic polyneuropathy associated with type 2 diabetes mellitus 01/18/2021    Elevated cholesterol     Gastroesophageal reflux disease 05/13/2019    Headache     HTN (hypertension), benign 05/03/2017    Hyperlipidemia     Hypertension     Mixed hyperlipidemia 02/07/2017    Multiple lung nodules 01/26/2020    5mm, 9 mm RLL identified 1/2020, not present 10/2019.    Myocardial infarction     Osteomyelitis 01/22/2020    Osteomyelitis of fifth toe of right foot 10/07/2019    Pancreatitis     Persistent insomnia 01/20/2020    Renal disorder     Sleep apnea 02/06/2017    Sleep apnea with use of continuous positive airway pressure (CPAP)     NON-COMPLIANT    Slow transit constipation 01/16/2019    Spinal stenosis in cervical region 09/16/2021    Vitamin D deficiency 03/02/2021     Past Surgical History:   Procedure Laterality Date    ABDOMINAL SURGERY      AMPUTATION FOOT / TOE Left 10/2021    5th digit     ANTERIOR CERVICAL DISCECTOMY W/ FUSION N/A 8/5/2022    Procedure:  CERVICAL DISCECTOMY ANTERIOR WITH FUSION C5-6 with possible plating of C5-7 with neuromonitoring and 1 c-arm;  Surgeon: Karel Soliz MD;  Location:  [facility] OR;  Service: Neurosurgery;  Laterality: N/A;    APPENDECTOMY      BACK SURGERY      CARDIAC CATHETERIZATION Left 2021    Procedure: Left Heart Cath w poss intervention left anatomical snuff box acess;  Surgeon: Omkar Charles DO;  Location:  [facility] CATH INVASIVE LOCATION;  Service: Cardiology;  Laterality: Left;    CARDIAC SURGERY      CATARACT EXTRACTION      CERVICAL SPINE SURGERY      COLONOSCOPY N/A 2017    Normal exam repeat in 5 years    COLONOSCOPY N/A 2019    Mild acute inflammation    COLONOSCOPY N/A 2024    Procedure: COLONOSCOPY WITH ANESTHESIA;  Surgeon: Raymond Mederos MD;  Location:  [facility] OR;  Service: Gastroenterology;  Laterality: N/A;  Pre: Anemia, Rectal bleed;  Post: Visible vessel at 20cm, clip x6 placed;  Del Shetty MD    COLONOSCOPY W/ POLYPECTOMY  2014    Hyperplastic polyp    CORONARY ARTERY BYPASS GRAFT  10/2015    ENDOSCOPY  2011    Gastritis with hemorrhage    ENDOSCOPY N/A 2017    Normal exam    ENDOSCOPY N/A 2019    Gastritis    ENDOSCOPY N/A 2020    Non-erosive gastritis with hemorrhage    ENDOSCOPY N/A 02/10/2021    Esophagitis    FOOT SURGERY Left     INCISION AND DRAINAGE OF WOUND Left 2007    spider bite     Family History   Problem Relation Age of Onset    Colon cancer Father     Heart disease Father     Colon cancer Sister     Colon polyps Sister     Alzheimer's disease Mother     Coronary artery disease Sister     Coronary artery disease Sister      Social History     Socioeconomic History    Marital status:    Tobacco Use    Smoking status: Former     Current packs/day: 0.00     Types: Cigarettes     Quit date:      Years since quittin.2    Smokeless tobacco: Never    Tobacco comments:     smoked in [employer]   Vaping Use    Vaping  "status: Never Used   Substance and Sexual Activity    Alcohol use: No    Drug use: No    Sexual activity: Defer     Allergies   Allergen Reactions    Cefepime Hives and Anaphylaxis    Bactrim [Sulfamethoxazole-Trimethoprim] Other (See Comments)     \"RENAL FAILURE\"    Vancomycin Itching    Zolpidem Unknown - High Severity     \"makes him crazy\"    Zolpidem Tartrate Unknown - Low Severity and Provider Review Needed    Metronidazole Rash     Current Facility-Administered Medications   Medication Dose Route Frequency Provider Last Rate Last Admin    acetaminophen (TYLENOL) tablet 650 mg  650 mg Oral Q4H PRN Raymond Mederos MD   650 mg at 04/04/24 0002    Or    acetaminophen (TYLENOL) 160 MG/5ML oral solution 650 mg  650 mg Oral Q4H PRN Raymond Mederos MD   650 mg at 03/27/24 2109    Or    acetaminophen (TYLENOL) suppository 650 mg  650 mg Rectal Q4H PRN Raymond Mederos MD        ascorbic acid (VITAMIN C) tablet 1,000 mg  1,000 mg Oral Daily Raymond Mederos MD   1,000 mg at 04/09/24 0827    sennosides-docusate (PERICOLACE) 8.6-50 MG per tablet 1 tablet  1 tablet Oral BID Raymond Mederos MD   1 tablet at 04/08/24 2107    And    polyethylene glycol (MIRALAX) packet 17 g  17 g Oral Daily Raymond Mederos MD   17 g at 04/06/24 0932    And    bisacodyl (DULCOLAX) EC tablet 5 mg  5 mg Oral Daily PRN Raymond Mederos MD        And    bisacodyl (DULCOLAX) suppository 10 mg  10 mg Rectal Daily PRN Raymond Mederos MD        bumetanide (BUMEX) tablet 2 mg  2 mg Oral Daily Vinny Hartley MD   2 mg at 04/09/24 0827    carvedilol (COREG) tablet 3.125 mg  3.125 mg Oral BID With Meals Rene Maloney MD   3.125 mg at 04/09/24 0827    dextrose (D50W) (25 g/50 mL) IV injection 25 g  25 g Intravenous Q15 Min PRN Raymond Mederos MD        dextrose (GLUTOSE) oral gel 15 g  15 g Oral Q15 Min PRN Raymond Mederos MD   15 g at 03/27/24 1710    Diclofenac Sodium (VOLTAREN) 1 % gel 2 g  2 g Topical 4x Daily PRN Raymond Mederos MD        " dicyclomine (BENTYL) capsule 20 mg  20 mg Oral TID PRN Raymond Mederos MD   20 mg at 04/07/24 2050    donepezil (ARICEPT) tablet 10 mg  10 mg Oral Daily Raymond Mederos MD   10 mg at 04/09/24 0828    [Transfer Hold] glucagon (GLUCAGEN) injection 1 mg  1 mg Intramuscular Q15 Min PRN Raquel Duncan, APRN        hydrocortisone (ANUSOL-HC) suppository 25 mg  25 mg Rectal BID Raymond Mederos MD   25 mg at 04/09/24 0828    insulin detemir (LEVEMIR) injection 30 Units  30 Units Subcutaneous Nightly Raymond Mederos MD   30 Units at 04/08/24 2106    Insulin Lispro (humaLOG) injection 4-24 Units  4-24 Units Subcutaneous 4x Daily AC & at Bedtime Raymond Mederos MD   4 Units at 04/09/24 0834    insulin regular (humuLIN R,novoLIN R) injection 10 Units  10 Units Subcutaneous TID AC Raymond Mederos MD   10 Units at 04/09/24 0834    isosorbide mononitrate (IMDUR) 24 hr tablet 30 mg  30 mg Oral Q24H Raymond Mederos MD   30 mg at 04/09/24 0828    lactulose solution 20 g  20 g Oral TID Raymond Mederos MD   20 g at 04/09/24 0828    linezolid (ZYVOX) tablet 600 mg  600 mg Oral Q12H Raymond Mederos MD   600 mg at 04/09/24 0828    magnesium hydroxide (MILK OF MAGNESIA) suspension 10 mL  10 mL Oral Daily PRN Ramyond Mederos MD   10 mL at 04/03/24 0414    melatonin tablet 6 mg  6 mg Oral Nightly Raymond Mederos MD   6 mg at 04/08/24 2106    midodrine (PROAMATINE) tablet 2.5 mg  2.5 mg Oral TID AC Raymond Mederos MD   2.5 mg at 04/09/24 1226    mupirocin (BACTROBAN) 2 % nasal ointment 1 Application  1 Application Each Nare BID Raymond Mederos MD   1 Application at 04/09/24 0828    nitroglycerin (NITROSTAT) SL tablet 0.4 mg  0.4 mg Sublingual Q5 Min PRN Raymond Mederos MD        ondansetron ODT (ZOFRAN-ODT) disintegrating tablet 4 mg  4 mg Oral Q6H PRN Raymond Mederos MD   4 mg at 04/08/24 2115    Or    ondansetron (ZOFRAN) injection 4 mg  4 mg Intravenous Q6H PRN Raymond Mederos MD   4 mg at 03/29/24 1837    oxyCODONE (ROXICODONE)  immediate release tablet 10 mg  10 mg Oral BID PRN Raymond Mederos MD   10 mg at 04/09/24 1039    pantoprazole (PROTONIX) EC tablet 40 mg  40 mg Oral BID AC Rene Maloney MD   40 mg at 04/09/24 0834    pregabalin (LYRICA) capsule 100 mg  100 mg Oral Nightly Rene Maloney MD   100 mg at 04/08/24 2106    pregabalin (LYRICA) capsule 50 mg  50 mg Oral Daily Rene Maloney MD   50 mg at 04/09/24 0834    rosuvastatin (CRESTOR) tablet 10 mg  10 mg Oral Nightly Raymond Mederos MD   10 mg at 04/08/24 2106    sodium chloride 0.9 % flush 10 mL  10 mL Intravenous PRN Raymond Mederos MD        sodium chloride 0.9 % flush 10 mL  10 mL Intravenous Q12H Raymond Mederos MD   10 mL at 04/07/24 2050    sodium chloride 0.9 % flush 10 mL  10 mL Intravenous PRN Raymond Mederos MD        sodium chloride 0.9 % flush 10 mL  10 mL Intravenous Q12H Tj Hardwick CRNA        sodium chloride 0.9 % flush 10 mL  10 mL Intravenous PRN Tj Hardwick CRNA        sodium chloride 0.9 % infusion 40 mL  40 mL Intravenous PRN Raymond Mederos MD        sodium chloride 0.9 % infusion 40 mL  40 mL Intravenous PRN Tj Hardwick CRNA        sodium chloride 0.9 % infusion  100 mL/hr Intravenous Continuous Tj Hardwick CRNA        sucralfate (CARAFATE) 1 GM/10ML suspension 1 g  1 g Oral TID AC Raymond Mederos MD   1 g at 04/09/24 1226    tamsulosin (FLOMAX) 24 hr capsule 0.4 mg  0.4 mg Oral Daily Raymond Mederos MD   0.4 mg at 04/09/24 0827    timolol (TIMOPTIC) 0.25 % ophthalmic solution 1 drop  1 drop Both Eyes Q12H Raymond Mederos MD   1 drop at 04/09/24 0829    zinc sulfate (ZINCATE) capsule 220 mg  220 mg Oral Daily Raymond Mederos MD   220 mg at 04/09/24 0827     Review of Systems   Constitutional:  Negative for activity change.   HENT:  Negative for congestion.    Respiratory:  Negative for apnea and shortness of breath.    Gastrointestinal:  Negative for abdominal pain.   Musculoskeletal:  Positive for arthralgias. Negative for  back pain.   Skin:  Positive for wound.   Neurological:  Positive for numbness.   Psychiatric/Behavioral:  Negative for agitation, behavioral problems and confusion.        OBJECTIVE     Vitals:    04/09/24 1549   BP: 115/46   Pulse: 69   Resp: 18   Temp: 97.4 °F (36.3 °C)   SpO2: 100%       PHYSICAL EXAM  GEN:   Pt presents up in bedside chair. Accompanied by none.     Foot/Ankle Exam    GENERAL  Appearance:  appears stated age  Orientation:  AAOx3  Affect:  appropriate    VASCULAR     Right Foot Vascularity   Dorsalis pedis:  2+  Posterior tibial:  2+  Skin temperature:  warm  Edema grading:  Trace  CFT:  3  Varicosities:  none     Left Foot Vascularity   Posterior tibial:  2+  Skin temperature:  warm  Edema grading:  Trace  CFT:  3  Pedal hair growth:  Absent  Varicosities:  none     NEUROLOGIC     Right Foot Neurologic   Light touch sensation: diminished  Vibratory sensation: diminished  Hot/Cold sensation: diminished     Left Foot Neurologic   Light touch sensation: diminished  Vibratory sensation: diminished  Hot/Cold sensation:  diminished    MUSCULOSKELETAL     Right Foot Musculoskeletal   Tenderness:  none       Left Foot Musculoskeletal    Amputation   Toes amputated: fifth toe  Tenderness:  none    MUSCLE STRENGTH     Right Foot Muscle Strength   Foot dorsiflexion:  5  Foot plantar flexion:  5  Foot inversion:  5  Foot eversion:  5     Left Foot Muscle Strength   Foot dorsiflexion:  4+  Foot plantar flexion:  4+  Foot inversion:  4+  Foot eversion:  4+    DERMATOLOGIC      Right Foot Dermatologic   Skin  Positive for wound.      Left Foot Dermatologic   Skin  Positive for wound.      Left foot additional comments: Multiple wounds- significant improvement of all three foot wounds since last week.   See photos    Image:                            RADIOLOGY/NUCLEAR:  NM GI Blood Loss    Result Date: 4/6/2024  Narrative: EXAM: NM GI BLOOD LOSS-  INDICATION: Lower GI bleed (Ped 0-17y)  RADIOPHARMACEUTICAL:  Tc-99m labeled red cells 27.6 mCi IV  TECHNICAL: The patient's red blood cells were labeled in vitro and reinjected.  Sequential five-minute anterior planar images of the abdomen were obtained for two hours.  These were also reformatted into cine mode.  COMPARISON: CT abdomen pelvis 7/22/2023  FINDINGS:  No evidence of an active GI bleed.      Impression:  No evidence of an active GI bleed.  This report was signed and finalized on 4/6/2024 1:31 PM by Ronal Wisdom.      XR Abdomen KUB    Result Date: 4/3/2024  Narrative: EXAMINATION: XR ABDOMEN KUB- 4/3/2024 1:45 PM  HISTORY: abdominal distention; E11.628-Type 2 diabetes mellitus with other skin complications; L08.9-Local infection of the skin and subcutaneous tissue, unspecified; E11.65-Type 2 diabetes mellitus with hyperglycemia; Z74.09-Other reduced mobility.  REPORT: Supine imaging of the abdomen was performed.  COMPARISON: KUB 10/7/2023.  A large amount of stool seen within the transverse colon and flexures, likely representing constipation. No evidence of bowel obstruction is identified. There are no definite urinary tract calculi. Cholecystectomy clips are present. No acute osseous abnormality is identified.      Impression: Extensive constipation within the transverse colon and flexures.  This report was signed and finalized on 4/3/2024 1:46 PM by Dr. Percy Cesar MD.      Adult Transthoracic Echo Complete W/ Cont if Necessary Per Protocol    Result Date: 4/2/2024  Narrative:   The study is technically difficult for diagnosis.  If there is concern for possible infective endocarditis, recommend transesophageal echocardiogram to better visualize the valves.   Left ventricular systolic function is normal. Left ventricular ejection fraction appears to be 61 - 65%.   Left ventricular wall thickness is consistent with mild concentric hypertrophy   Left ventricular diastolic function is consistent with (grade III w/high LAP) fixed restrictive pattern.   Mildly  reduced right ventricular systolic function noted.   The left atrial cavity is mildly dilated.   There is mild calcification of the aortic valve. No aortic valve regurgitation is present. No hemodynamically significant aortic valve stenosis is present.     MRI Foot Left With & Without Contrast    Result Date: 3/29/2024  Narrative: EXAMINATION: MRI FOOT LEFT W WO CONTRAST-  3/29/2024 4:30 PM  HISTORY: Foot swelling, diabetic, osteomyelitis suspected, xray done; E11.628-Type 2 diabetes mellitus with other skin complications; L08.9-Local infection of the skin and subcutaneous tissue, unspecified; E11.65-Type 2 diabetes mellitus with hyperglycemia; Z74.09-Other reduced mobility  LEFT foot MRI without and with IV gadolinium contrast. Axial, sagittal, and coronal sequences.  COMPARISON: LEFT foot x-rays from 3/26/2024.  There is a tiny metal foreign body within the plantar soft tissues adjacent to the second toe and despite the small size it produces a prominent amount of artifact related to image distortion.  I see no soft tissue abscess.  No discrete bone edema or bone enhancement is seen to indicate osteomyelitis.      Impression: 1. There are some limitations to the study based on metal related artifact. 2. No soft tissue abscess or definite bone marrow edema or enhancement is seen to indicate osteomyelitis.    This report was signed and finalized on 3/29/2024 6:58 PM by Dr. Gomez Mcgill MD.      XR Foot 3+ View Left    Result Date: 3/26/2024  Narrative: EXAMINATION: XR FOOT 3+ VW LEFT-  3/26/2024 4:19 PM  HISTORY: foot infection  3 view LEFT foot exam.  Previous amputation of the mid/distal fifth metatarsal.  Interval resection or resorption of the fourth metatarsal head.  No bone destruction or soft tissue air is seen.  High-grade degenerative disease at the first metatarsal phalangeal joint.  Unchanged appearance of prominent dorsal midfoot spurring and prominent inferior calcaneal spurring.  Mild soft tissue  edema over the dorsum of the foot is less prominent as compared with the previous exam.      Impression: 1. No bone destruction or soft tissue air is seen. 2. Prominent degenerative spurring.    This report was signed and finalized on 3/26/2024 5:25 PM by Dr. Gomez Mcgill MD.      XR Chest 1 View    Result Date: 3/26/2024  Narrative: EXAM: XR CHEST 1 VW-  DATE: 3/26/2024 1:15 PM  HISTORY: ams   COMPARISON: 8/28/2023.  TECHNIQUE:  Frontal view(s) of the chest submitted.  FINDINGS:  Patient is status post median sternotomy and CABG. There is enlargement of the cardiac silhouette. There are low lung volumes with vascular crowding versus mild volume overload. No effusion or pneumothorax is seen.       Impression:  1. Postoperative chest and low lung volumes with vascular crowding versus mild volume overload.  This report was signed and finalized on 3/26/2024 2:34 PM by Eran Christina.      CT Head Without Contrast    Result Date: 3/26/2024  Narrative: EXAM: CT HEAD WO CONTRAST-  DATE: 3/26/2024 1:03 PM  HISTORY: ams   COMPARISON: 10/10/2023.  DOSE LENGTH PRODUCT: 876.8 mGy.cm  Automated exposure control was also utilized to decrease patient radiation dose.  TECHNIQUE: Unenhanced CT images obtained from vertex to skull base with multiplanar reformats.  FINDINGS: There is no acute intracranial hemorrhage, midline shift, mass effect, or hydrocephalus. There is no CT evidence for acute infarct.  There are chronic changes with volume loss and chronic small vessel ischemic change of the periventricular white matter.  SOFT TISSUES: The scalp soft tissues are unremarkable.   SINUS: There is partially imaged mucosal thickening at the right maxillary sinus.  ORBITS: The visualized orbits and globes are unremarkable. There is bilateral lens extraction.       Impression: 1. Chronic changes and no acute intracranial findings.  This report was signed and finalized on 3/26/2024 2:10 PM by Eran Christina.       LABORATORY/CULTURE  RESULTS:  Results from last 7 days   Lab Units 04/09/24  1237 04/09/24  0611 04/09/24  0004 04/08/24  1218 04/08/24 0623 04/07/24  1429 04/07/24 0422   WBC 10*3/mm3  --  4.35  --   --  6.19  --  5.70   HEMOGLOBIN g/dL 8.4* 9.2* 8.4*   < > 8.7*   < > 9.2*   HEMATOCRIT % 26.5* 29.0* 26.5*   < > 27.0*   < > 29.5*   PLATELETS 10*3/mm3  --  231  --   --  246  --  275    < > = values in this interval not displayed.     Results from last 7 days   Lab Units 04/09/24  0610 04/08/24 0623 04/07/24 0422 04/06/24  0340 04/05/24  0512   SODIUM mmol/L 141 141 141   < > 139  139   POTASSIUM mmol/L 4.1 4.0 4.0   < > 4.4  4.4   CHLORIDE mmol/L 104 104 103   < > 102  102   CO2 mmol/L 26.0 25.0 27.0   < > 25.0  25.0   BUN mg/dL 28* 30* 33*   < > 31*  31*   CREATININE mg/dL 1.84* 1.61* 1.75*   < > 1.98*  1.98*   CALCIUM mg/dL 9.4 9.1 10.1   < > 11.1*  11.1*   BILIRUBIN mg/dL  --  0.4  --   --  0.4   ALK PHOS U/L  --  107  --   --  138*   ALT (SGPT) U/L  --  14  --   --  18   AST (SGOT) U/L  --  16  --   --  22   GLUCOSE mg/dL 170* 82 94   < > 173*  173*    < > = values in this interval not displayed.     Results from last 7 days   Lab Units 04/06/24 0340   INR  1.13*   APTT seconds 39.2*     Microbiology Results (last 10 days)       Procedure Component Value - Date/Time    Blood Culture With DARSHAN - Blood, Hand, Right [[id number]]  (Normal) Collected: 04/01/24 0402    Lab Status: Final result Specimen: Blood from Hand, Right Updated: 04/06/24 0445     Blood Culture No growth at 5 days            PATHOLOGY RESULTS:       ASSESSMENT/PLAN   Diabetic foot ulcerations to left foot  Type 2 diabetes mellitus with hyperglycemia  Diabetic polyneuropathy  Sepsis   Metabolic encephalopathy secondary to sepsis-improving    Continue wound care twice daily with Betadine soaked gauze wet-to-dry to be changed by nursing.    MRI impression shows no soft tissue abscess, no definite bone marrow edema, and no enhancement seen to indicate  osteomyelitis at this time.    Continue antibiotics per infectious disease.     Patient to follow up with Dr. Gomes at [facility] upon discharge.     Will continue to follow patient while admitted.       This document has been electronically signed by SUSAN Perez on April 9, 2024 16:22 CDT

## 2024-04-09 NOTE — PLAN OF CARE
Goal Outcome Evaluation:  Plan of Care Reviewed With: patient        Progress: improving  Outcome Evaluation: Patient rested well. Complains of pain and nausea x1 thus far. Voiding. A&O. Up in chair most of the night. VSS. Safety maintained.

## 2024-04-09 NOTE — THERAPY TREATMENT NOTE
Patient Name: Erick Luong  : 1955    MRN: [id number]                              Today's Date: 2024       Admit Date: 3/26/2024    Visit Dx:     ICD-10-CM ICD-9-CM   1. Diabetic foot infection  E11.628 250.80    L08.9 686.9   2. Poorly controlled diabetes mellitus  E11.65 250.00   3. Impaired functional mobility and activity tolerance [Z74.09]  Z74.09 V49.89   4. Rectal bleeding  K62.5 569.3   5. Anemia due to stage 3 chronic kidney disease, unspecified whether stage 3a or 3b CKD  N18.30 285.21    D63.1 585.3     Patient Active Problem List   Diagnosis    Obesity, unspecified obesity severity, unspecified obesity type    Essential hypertension    Type 2 diabetes mellitus with hyperglycemia, with long-term current use of insulin    Nonsmoker    Anemia due to chronic kidney disease    Class 3 severe obesity due to excess calories with body mass index (BMI) of 40.0 to 44.9 in adult    Anasarca    Sleep apnea with use of continuous positive airway pressure (CPAP)    Medically noncompliant    Diabetic ulcer of left midfoot associated with type 2 diabetes mellitus, with fat layer exposed    Diabetic polyneuropathy associated with type 2 diabetes mellitus    Spinal stenosis in cervical region    Degeneration of cervical intervertebral disc    Cervical radiculopathy    Degeneration of lumbar or lumbosacral intervertebral disc    Cervical myelopathy    Bilateral carpal tunnel syndrome    CAD (coronary artery disease)    GERD without esophagitis    BPH without obstruction/lower urinary tract symptoms    Stage 3b chronic kidney disease    Chronic diastolic heart failure    Type 2 myocardial infarction due to heart failure    Left carpal tunnel syndrome    Syncope and collapse, recurrent episodes    Poorly-controlled hypertension    Rhinovirus    Peripheral vascular disease    Chronic kidney disease (CKD), stage IV (severe)    Diabetic foot infection    Sepsis     Past Medical History:   Diagnosis Date     Arthritis     Autonomic disease     CAD (coronary artery disease) 02/06/2017    Cervical radiculopathy 09/16/2021    Chronic constipation with acute exaccerbation 05/10/2021    Coronary artery disease     Degeneration of cervical intervertebral disc 08/11/2021    Diabetes mellitus     Diabetic foot ulcer 08/31/2020    Diabetic polyneuropathy associated with type 2 diabetes mellitus 01/18/2021    Elevated cholesterol     Gastroesophageal reflux disease 05/13/2019    Headache     HTN (hypertension), benign 05/03/2017    Hyperlipidemia     Hypertension     Mixed hyperlipidemia 02/07/2017    Multiple lung nodules 01/26/2020    5mm, 9 mm RLL identified 1/2020, not present 10/2019.    Myocardial infarction     Osteomyelitis 01/22/2020    Osteomyelitis of fifth toe of right foot 10/07/2019    Pancreatitis     Persistent insomnia 01/20/2020    Renal disorder     Sleep apnea 02/06/2017    Sleep apnea with use of continuous positive airway pressure (CPAP)     NON-COMPLIANT    Slow transit constipation 01/16/2019    Spinal stenosis in cervical region 09/16/2021    Vitamin D deficiency 03/02/2021     Past Surgical History:   Procedure Laterality Date    ABDOMINAL SURGERY      AMPUTATION FOOT / TOE Left 10/2021    5th digit     ANTERIOR CERVICAL DISCECTOMY W/ FUSION N/A 8/5/2022    Procedure: CERVICAL DISCECTOMY ANTERIOR WITH FUSION C5-6 with possible plating of C5-7 with neuromonitoring and 1 c-arm;  Surgeon: Karel Soliz MD;  Location:  [facility] OR;  Service: Neurosurgery;  Laterality: N/A;    APPENDECTOMY      BACK SURGERY      CARDIAC CATHETERIZATION Left 02/08/2021    Procedure: Left Heart Cath w poss intervention left anatomical snuff box acess;  Surgeon: Omkar Charles DO;  Location:  [facility] CATH INVASIVE LOCATION;  Service: Cardiology;  Laterality: Left;    CARDIAC SURGERY      CATARACT EXTRACTION      CERVICAL SPINE SURGERY      COLONOSCOPY N/A 01/31/2017    Normal exam repeat in 5 years    COLONOSCOPY N/A  02/11/2019    Mild acute inflammation    COLONOSCOPY N/A 4/7/2024    Procedure: COLONOSCOPY WITH ANESTHESIA;  Surgeon: Raymond Mederos MD;  Location: [facility];  Service: Gastroenterology;  Laterality: N/A;  Pre: Anemia, Rectal bleed;  Post: Visible vessel at 20cm, clip x6 placed;  Del Shetty MD    COLONOSCOPY W/ POLYPECTOMY  03/04/2014    Hyperplastic polyp    CORONARY ARTERY BYPASS GRAFT  10/2015    ENDOSCOPY  04/13/2011    Gastritis with hemorrhage    ENDOSCOPY N/A 05/05/2017    Normal exam    ENDOSCOPY N/A 02/11/2019    Gastritis    ENDOSCOPY N/A 09/01/2020    Non-erosive gastritis with hemorrhage    ENDOSCOPY N/A 02/10/2021    Esophagitis    FOOT SURGERY Left     INCISION AND DRAINAGE OF WOUND Left 09/2007    spider bite      General Information       Row Name 04/09/24 1022          OT Time and Intention    Document Type therapy note (daily note)  -LS     Mode of Treatment occupational therapy  -LS       Row Name 04/09/24 1022          General Information    Patient Profile Reviewed yes  -LS     Existing Precautions/Restrictions fall  LLE WBAT in CAM boot  -LS       Row Name 04/09/24 1022          Cognition    Orientation Status (Cognition) oriented x 4  -LS       Row Name 04/09/24 1022          Safety Issues, Functional Mobility    Safety Issues Affecting Function (Mobility) awareness of need for assistance;impulsivity;insight into deficits/self-awareness;judgment;positioning of assistive device;problem-solving;safety precaution awareness;safety precautions follow-through/compliance  -LS     Impairments Affecting Function (Mobility) balance;coordination;endurance/activity tolerance;pain;cognition;sensation/sensory awareness;strength;range of motion (ROM)  -LS     Cognitive Impairments, Mobility Safety/Performance awareness, need for assistance;insight into deficits/self-awareness;attention;judgment;problem-solving/reasoning;safety precaution awareness;safety precaution follow-through;impulsivity  -LS                User Key  (r) = Recorded By, (t) = Taken By, (c) = Cosigned By      Initials Name Provider Type     Alesha Barrett OTR/L Occupational Therapist                     Mobility/ADL's       Row [facility] 04/09/24 1022          Transfers    Transfers sit-stand transfer;stand-sit transfer;toilet transfer  -LS       Row Name 04/09/24 1022          Sit-Stand Transfer    Sit-Stand [place] (Transfers) contact guard  -LS       Row Name 04/09/24 1022          Stand-Sit Transfer    Stand-Sit [place] (Transfers) contact guard  -LS       Row Name 04/09/24 1022          Toilet Transfer    Type (Toilet Transfer) sit-stand;stand-sit  -     [place] Level (Toilet Transfer) contact guard  -     Assistive Device (Toilet Transfer) walker, front-wheeled;grab bars/safety frame  -LS       Row Name 04/09/24 1022          Functional Mobility    Functional Mobility- Ind. Level contact guard assist  -     Functional Mobility- Device walker, front-wheeled  -     Patient was able to Ambulate yes  -LS       Row Name 04/09/24 1022          Activities of Daily Living    BADL Assessment/Intervention toileting;grooming;lower body dressing  -LS       Row Name 04/09/24 1022          Mobility    Extremity Weight-bearing Status left lower extremity  -     Left Lower Extremity (Weight-bearing Status) weight-bearing as tolerated (WBAT);other (see comments)  in CAM boot  -LS       Row Name 04/09/24 1022          Toileting Assessment/Training    [place] Level (Toileting) toileting skills;perform perineal hygiene;maximum assist (25% patient effort)  -LS     Position (Toileting) unsupported sitting;supported standing  -LS       Row Name 04/09/24 1022          Lower Body Dressing Assessment/Training    [place] Level (Lower Body Dressing) lower body dressing skills;don;shoes/slippers;maximum assist (25% patient effort)  -LS     Position (Lower Body Dressing) supported sitting  -LS       Row Name 04/09/24 1022           Grooming Assessment/Training    [place] Level (Grooming) grooming skills;oral care regimen;wash face, hands;contact guard assist  -     Position (Grooming) unsupported standing  -               User Key  (r) = Recorded By, (t) = Taken By, (c) = Cosigned By      Initials Name Provider Type    Alesha Bloom OTR/L Occupational Therapist                   Obj/Interventions    No documentation.                  Goals/Plan    No documentation.                  Clinical Impression       Row Name 04/09/24 1022          Pain Assessment    Pretreatment Pain Rating 8/10  -LS     Posttreatment Pain Rating 8/10  -     Pain Location - abdomen  -LS     Pain Intervention(s) Repositioned;Ambulation/increased activity;Nursing Notified;Medication (See MAR)  -       Row Name 04/09/24 1022          Plan of Care Review    Plan of Care Reviewed With patient  -LS     Progress improving  -     Outcome Evaluation OT tx completed. Pt seated in recliner upon therapist arrival; A&Ox4; c/o 8/10 abdominal pain. Pt required Max A to dong CAM boot onto LLE. Pt performed all sit<>stand transfers from chair and toilet utilizing rwx and/or grab bar with CGA. Pt ambulated from chair<>BR utilizing rwx with CGA. Pt required Max A to perform posterior poncho hygiene during toilet task. Pt performed hand and oral hygiene while standing unsupported at sink with CGA. Pt demos improvement in ability to appropriately sequence through self care tasks on this date. Pt continues to benefit from skilled OT intervention in order to address remaining deficits in fxl mobility, fxl activity tolerance, balance, coordination, strength, and use of adaptive techniques/equipment during performance of BADLs. Recommend SNF at discharge.  -       Row Name 04/09/24 1022          Therapy Plan Review/Discharge Plan (OT)    Anticipated Discharge Disposition (OT) skilled nursing facility  -       Row Name 04/09/24 1022          Positioning and Restraints     Pre-Treatment Position sitting in chair/recliner  -LS     Post Treatment Position chair  -LS     In Chair reclined;call light within reach;encouraged to call for assist;exit alarm on;legs elevated  -LS               User Key  (r) = Recorded By, (t) = Taken By, (c) = Cosigned By      Initials Name Provider Type    LS Alesha Barrett, OTR/L Occupational Therapist                   Outcome Measures       Row Name 04/09/24 1022          How much help from another is currently needed...    Putting on and taking off regular lower body clothing? 2  -LS     Bathing (including washing, rinsing, and drying) 2  -LS     Toileting (which includes using toilet bed pan or urinal) 2  -LS     Putting on and taking off regular upper body clothing 3  -LS     Taking care of personal grooming (such as brushing teeth) 3  -LS     Eating meals 4  -LS     AM-PAC 6 Clicks Score (OT) 16  -LS       Row Name 04/09/24 0815          How much help from another person do you currently need...    Turning from your back to your side while in flat bed without using bedrails? 4 (P)   -SW     Moving from lying on back to sitting on the side of a flat bed without bedrails? 4 (P)   -SW     Moving to and from a bed to a chair (including a wheelchair)? 3 (P)   -SW     Standing up from a chair using your arms (e.g., wheelchair, bedside chair)? 3 (P)   -SW     Climbing 3-5 steps with a railing? 2 (P)   -SW     To walk in hospital room? 3 (P)   -SW     AM-PAC 6 Clicks Score (PT) 19 (P)   -SW     Highest Level of Mobility Goal 6 --> Walk 10 steps or more (P)   -SW       Row Name 04/09/24 1022 04/09/24 0815       Functional Assessment    Outcome Measure Options AM-PAC 6 Clicks Daily Activity (OT)  -LS AM-PAC 6 Clicks Basic Mobility (PT) (P)   -SW              User Key  (r) = Recorded By, (t) = Taken By, (c) = Cosigned By      Initials Name Provider Type    LS Alesha Barrett, OTR/L Occupational Therapist    Kevin Ríos, PT Student PT Student                     Occupational Therapy Education       Title: PT OT SLP Therapies (In Progress)       Topic: Occupational Therapy (In Progress)       Point: ADL training (Done)       Description:   Instruct learner(s) on proper safety adaptation and remediation techniques during self care or transfers.   Instruct in proper use of assistive devices.                  Learning Progress Summary             Patient Acceptance, E, VU,NR by LS at 4/9/2024 1053    Acceptance, E,D, VU,NR by LR at 4/8/2024 1629    Acceptance, E, VU,NR by LS at 4/5/2024 1240    Acceptance, E,D, NR,NL by LR at 4/4/2024 1325    Acceptance, E,D, VU,NR by LR at 4/3/2024 1119    Acceptance, E, VU,NR by LS at 4/2/2024 1128    Acceptance, E, VU,NR by  at 3/29/2024 1424    Comment: Role of OT, OT POC, fall prevention, benefits of ambulation, d/c recommendations                         Point: Home exercise program (Not Started)       Description:   Instruct learner(s) on appropriate technique for monitoring, assisting and/or progressing therapeutic exercises/activities.                  Learner Progress:  Not documented in this visit.              Point: Precautions (Done)       Description:   Instruct learner(s) on prescribed precautions during self-care and functional transfers.                  Learning Progress Summary             Patient Acceptance, E, VU,NR by LS at 4/9/2024 1053    Acceptance, E,D, VU,NR by LR at 4/8/2024 1629    Acceptance, E, VU,NR by LS at 4/5/2024 1240    Acceptance, E,D, NR,NL by LR at 4/4/2024 1325    Acceptance, E,D, VU,NR by LR at 4/3/2024 1119    Acceptance, E, VU,NR by LS at 4/2/2024 1128    Acceptance, E, VU,NR by  at 3/29/2024 1424    Comment: Role of OT, OT POC, fall prevention, benefits of ambulation, d/c recommendations                         Point: Body mechanics (Done)       Description:   Instruct learner(s) on proper positioning and spine alignment during self-care, functional mobility activities and/or exercises.                   Learning Progress Summary             Patient Acceptance, E, VU,NR by LS at 4/9/2024 1053    Acceptance, E,D, VU,NR by LR at 4/8/2024 1629    Acceptance, E, VU,NR by LS at 4/5/2024 1240    Acceptance, E,D, NR,NL by LR at 4/4/2024 1325    Acceptance, E,D, VU,NR by LR at 4/3/2024 1119    Acceptance, E, VU,NR by LS at 4/2/2024 1128    Acceptance, E, VU,NR by HW at 3/29/2024 1424    Comment: Role of OT, OT POC, fall prevention, benefits of ambulation, d/c recommendations                                         User Key       Initials Effective Dates Name Provider Type Discipline    LS 06/20/22 -  Alesha Barrett OTR/L Occupational Therapist OT    LR 04/25/23 -  Tabitha Webb OTR/L Occupational Therapist OT     01/09/24 -  Yulia Pritchett OT Student OT Student OT                  OT Recommendation and Plan     Plan of Care Review  Plan of Care Reviewed With: patient  Progress: improving  Outcome Evaluation: OT tx completed. Pt seated in recliner upon therapist arrival; A&Ox4; c/o 8/10 abdominal pain. Pt required Max A to dong CAM boot onto LLE. Pt performed all sit<>stand transfers from chair and toilet utilizing rwx and/or grab bar with CGA. Pt ambulated from chair<>BR utilizing rwx with CGA. Pt required Max A to perform posterior poncho hygiene during toilet task. Pt performed hand and oral hygiene while standing unsupported at sink with CGA. Pt demos improvement in ability to appropriately sequence through self care tasks on this date. Pt continues to benefit from skilled OT intervention in order to address remaining deficits in fxl mobility, fxl activity tolerance, balance, coordination, strength, and use of adaptive techniques/equipment during performance of BADLs. Recommend SNF at discharge.     Time Calculation:         Time Calculation- OT       Row Name 04/09/24 1022 04/09/24 0815          Time Calculation- OT    OT Start Time 1022  -LS --     OT Stop Time 1049  -LS --     OT Time Calculation  (min) 27 min  -LS --     Total Timed Code Minutes- OT 27 minute(s)  -LS --     OT Received On 04/09/24  -LS --        Timed Charges    48335 - Gait Training Minutes  -- 15 (P)   -SW        Total Minutes    Timed Charges Total Minutes -- 15 (P)   -SW      Total Minutes -- 15 (P)   -SW               User Key  (r) = Recorded By, (t) = Taken By, (c) = Cosigned By      Initials Name Provider Type    Alesha Bloom OTR/L Occupational Therapist    Kevin Ríos, PT Student PT Student                  Therapy Charges for Today       Code Description Service Date Service Provider Modifiers Qty    [id number] HC OT SELF CARE/MGMT/TRAIN EA 15 MIN 4/9/2024 Alesha Barrett OTR/L GO 2                 ALEXANDER Gross/EDVIN  4/9/2024

## 2024-04-09 NOTE — PLAN OF CARE
Goal Outcome Evaluation:           Progress: improving     VSS.  Fall precautions.  Tolerating ADA diet.  Wound care to left foot by podiatry APRN.  Oral antibiotics.  Safety maintained.  A/O.

## 2024-04-10 LAB
ANION GAP SERPL CALCULATED.3IONS-SCNC: 10 MMOL/L (ref 5–15)
BASOPHILS # BLD AUTO: 0.03 10*3/MM3 (ref 0–0.2)
BASOPHILS NFR BLD AUTO: 0.7 % (ref 0–1.5)
BUN SERPL-MCNC: 27 MG/DL (ref 8–23)
BUN/CREAT SERPL: 14.6 (ref 7–25)
CALCIUM SPEC-SCNC: 9.2 MG/DL (ref 8.6–10.5)
CHLORIDE SERPL-SCNC: 105 MMOL/L (ref 98–107)
CO2 SERPL-SCNC: 26 MMOL/L (ref 22–29)
CREAT SERPL-MCNC: 1.85 MG/DL (ref 0.76–1.27)
DEPRECATED RDW RBC AUTO: 45.4 FL (ref 37–54)
EGFRCR SERPLBLD CKD-EPI 2021: 39.4 ML/MIN/1.73
EOSINOPHIL # BLD AUTO: 0.17 10*3/MM3 (ref 0–0.4)
EOSINOPHIL NFR BLD AUTO: 3.7 % (ref 0.3–6.2)
ERYTHROCYTE [DISTWIDTH] IN BLOOD BY AUTOMATED COUNT: 13.9 % (ref 12.3–15.4)
GLUCOSE BLDC GLUCOMTR-MCNC: 100 MG/DL (ref 70–130)
GLUCOSE BLDC GLUCOMTR-MCNC: 165 MG/DL (ref 70–130)
GLUCOSE BLDC GLUCOMTR-MCNC: 182 MG/DL (ref 70–130)
GLUCOSE BLDC GLUCOMTR-MCNC: 216 MG/DL (ref 70–130)
GLUCOSE SERPL-MCNC: 124 MG/DL (ref 65–99)
HCT VFR BLD AUTO: 25.6 % (ref 37.5–51)
HCT VFR BLD AUTO: 25.9 % (ref 37.5–51)
HCT VFR BLD AUTO: 26.3 % (ref 37.5–51)
HCT VFR BLD AUTO: 26.7 % (ref 37.5–51)
HGB BLD-MCNC: 8 G/DL (ref 13–17.7)
HGB BLD-MCNC: 8.2 G/DL (ref 13–17.7)
HGB BLD-MCNC: 8.5 G/DL (ref 13–17.7)
HGB BLD-MCNC: 8.7 G/DL (ref 13–17.7)
IMM GRANULOCYTES # BLD AUTO: 0.01 10*3/MM3 (ref 0–0.05)
IMM GRANULOCYTES NFR BLD AUTO: 0.2 % (ref 0–0.5)
LYMPHOCYTES # BLD AUTO: 1.63 10*3/MM3 (ref 0.7–3.1)
LYMPHOCYTES NFR BLD AUTO: 35.4 % (ref 19.6–45.3)
MCH RBC QN AUTO: 27.9 PG (ref 26.6–33)
MCHC RBC AUTO-ENTMCNC: 31.2 G/DL (ref 31.5–35.7)
MCV RBC AUTO: 89.5 FL (ref 79–97)
MONOCYTES # BLD AUTO: 0.42 10*3/MM3 (ref 0.1–0.9)
MONOCYTES NFR BLD AUTO: 9.1 % (ref 5–12)
NEUTROPHILS NFR BLD AUTO: 2.34 10*3/MM3 (ref 1.7–7)
NEUTROPHILS NFR BLD AUTO: 50.9 % (ref 42.7–76)
NRBC BLD AUTO-RTO: 0 /100 WBC (ref 0–0.2)
PLATELET # BLD AUTO: 173 10*3/MM3 (ref 140–450)
PMV BLD AUTO: 10.2 FL (ref 6–12)
POTASSIUM SERPL-SCNC: 4.1 MMOL/L (ref 3.5–5.2)
RBC # BLD AUTO: 2.94 10*6/MM3 (ref 4.14–5.8)
SODIUM SERPL-SCNC: 141 MMOL/L (ref 136–145)
WBC NRBC COR # BLD AUTO: 4.6 10*3/MM3 (ref 3.4–10.8)

## 2024-04-10 PROCEDURE — 63710000001 INSULIN REGULAR HUMAN PER 5 UNITS: Performed by: INTERNAL MEDICINE

## 2024-04-10 PROCEDURE — 97116 GAIT TRAINING THERAPY: CPT

## 2024-04-10 PROCEDURE — 99231 SBSQ HOSP IP/OBS SF/LOW 25: CPT | Performed by: INTERNAL MEDICINE

## 2024-04-10 PROCEDURE — 82948 REAGENT STRIP/BLOOD GLUCOSE: CPT

## 2024-04-10 PROCEDURE — 63710000001 ONDANSETRON ODT 4 MG TABLET DISPERSIBLE: Performed by: INTERNAL MEDICINE

## 2024-04-10 PROCEDURE — 85025 COMPLETE CBC W/AUTO DIFF WBC: CPT | Performed by: INTERNAL MEDICINE

## 2024-04-10 PROCEDURE — 85018 HEMOGLOBIN: CPT | Performed by: HOSPITALIST

## 2024-04-10 PROCEDURE — 85014 HEMATOCRIT: CPT | Performed by: HOSPITALIST

## 2024-04-10 PROCEDURE — 63710000001 INSULIN LISPRO (HUMAN) PER 5 UNITS: Performed by: INTERNAL MEDICINE

## 2024-04-10 PROCEDURE — 80048 BASIC METABOLIC PNL TOTAL CA: CPT | Performed by: INTERNAL MEDICINE

## 2024-04-10 PROCEDURE — 97535 SELF CARE MNGMENT TRAINING: CPT

## 2024-04-10 RX ADMIN — PANTOPRAZOLE SODIUM 40 MG: 40 TABLET, DELAYED RELEASE ORAL at 16:54

## 2024-04-10 RX ADMIN — TIMOLOL MALEATE 1 DROP: 2.5 SOLUTION/ DROPS OPHTHALMIC at 21:38

## 2024-04-10 RX ADMIN — Medication 1 APPLICATION: at 08:27

## 2024-04-10 RX ADMIN — ACETAMINOPHEN 650 MG: 325 TABLET, FILM COATED ORAL at 21:41

## 2024-04-10 RX ADMIN — BUMETANIDE 2 MG: 1 TABLET ORAL at 08:27

## 2024-04-10 RX ADMIN — INSULIN HUMAN 10 UNITS: 100 INJECTION, SOLUTION PARENTERAL at 16:58

## 2024-04-10 RX ADMIN — PANTOPRAZOLE SODIUM 40 MG: 40 TABLET, DELAYED RELEASE ORAL at 08:27

## 2024-04-10 RX ADMIN — MIDODRINE HYDROCHLORIDE 2.5 MG: 2.5 TABLET ORAL at 08:28

## 2024-04-10 RX ADMIN — Medication 6 MG: at 21:37

## 2024-04-10 RX ADMIN — INSULIN HUMAN 10 UNITS: 100 INJECTION, SOLUTION PARENTERAL at 07:56

## 2024-04-10 RX ADMIN — PREGABALIN 100 MG: 100 CAPSULE ORAL at 21:37

## 2024-04-10 RX ADMIN — TIMOLOL MALEATE 1 DROP: 2.5 SOLUTION/ DROPS OPHTHALMIC at 08:29

## 2024-04-10 RX ADMIN — CARVEDILOL 3.12 MG: 3.12 TABLET, FILM COATED ORAL at 18:54

## 2024-04-10 RX ADMIN — TAMSULOSIN HYDROCHLORIDE 0.4 MG: 0.4 CAPSULE ORAL at 08:28

## 2024-04-10 RX ADMIN — INSULIN LISPRO 8 UNITS: 100 INJECTION, SOLUTION INTRAVENOUS; SUBCUTANEOUS at 16:58

## 2024-04-10 RX ADMIN — PREGABALIN 50 MG: 50 CAPSULE ORAL at 08:28

## 2024-04-10 RX ADMIN — Medication 1 APPLICATION: at 21:36

## 2024-04-10 RX ADMIN — OXYCODONE HYDROCHLORIDE AND ACETAMINOPHEN 1000 MG: 500 TABLET ORAL at 08:28

## 2024-04-10 RX ADMIN — LINEZOLID 600 MG: 600 TABLET, FILM COATED ORAL at 08:28

## 2024-04-10 RX ADMIN — SUCRALFATE 1 G: 1 SUSPENSION ORAL at 16:54

## 2024-04-10 RX ADMIN — ISOSORBIDE MONONITRATE 30 MG: 30 TABLET, EXTENDED RELEASE ORAL at 08:29

## 2024-04-10 RX ADMIN — INSULIN LISPRO 4 UNITS: 100 INJECTION, SOLUTION INTRAVENOUS; SUBCUTANEOUS at 21:36

## 2024-04-10 RX ADMIN — ONDANSETRON 4 MG: 4 TABLET, ORALLY DISINTEGRATING ORAL at 08:34

## 2024-04-10 RX ADMIN — OXYCODONE HYDROCHLORIDE 10 MG: 5 TABLET ORAL at 13:20

## 2024-04-10 RX ADMIN — ONDANSETRON 4 MG: 4 TABLET, ORALLY DISINTEGRATING ORAL at 16:16

## 2024-04-10 RX ADMIN — CARVEDILOL 3.12 MG: 3.12 TABLET, FILM COATED ORAL at 08:28

## 2024-04-10 RX ADMIN — SUCRALFATE 1 G: 1 SUSPENSION ORAL at 08:27

## 2024-04-10 RX ADMIN — LINEZOLID 600 MG: 600 TABLET, FILM COATED ORAL at 21:37

## 2024-04-10 RX ADMIN — DONEPEZIL HYDROCHLORIDE 10 MG: 10 TABLET, FILM COATED ORAL at 08:27

## 2024-04-10 RX ADMIN — ZINC SULFATE 220 MG (50 MG) CAPSULE 220 MG: CAPSULE at 08:27

## 2024-04-10 RX ADMIN — ROSUVASTATIN CALCIUM 10 MG: 10 TABLET, FILM COATED ORAL at 21:37

## 2024-04-10 NOTE — PROGRESS NOTES
[facility] Medicine Services  INPATIENT PROGRESS NOTE    Patient Name: Erick Luong  Date of Admission: 3/26/2024  Today's Date: 04/09/24  Length of Stay: 14  Primary Care Physician: Del Shetty MD    Subjective   Chief Complaint: Altered mental status    HPI:  4/9  Patient's hemoglobin 8.4G/DL today.  Continues to have epigastric pain.    Review of Systems   All pertinent negatives and positives are as above. All other systems have been reviewed and are negative unless otherwise stated.     Objective    Temp:  [97.3 °F (36.3 °C)-98.2 °F (36.8 °C)] 97.4 °F (36.3 °C)  Heart Rate:  [53-78] 65  Resp:  [16-18] 18  BP: (113-133)/(46-85) 133/70  Physical Exam  Constitutional:       General: He is not in acute distress.     Appearance: He is well-developed. He is not diaphoretic.   HENT:      Head: Normocephalic.   Eyes:      General: No scleral icterus.     Conjunctiva/sclera: Conjunctivae normal.      Pupils: Pupils are equal, round, and reactive to light.   Neck:      Thyroid: No thyromegaly.      Vascular: No JVD.      Trachea: No tracheal deviation.   Cardiovascular:      Rate and Rhythm: Normal rate and regular rhythm.      Heart sounds: Normal heart sounds. No murmur heard.     No friction rub. No gallop.   Pulmonary:      Effort: Pulmonary effort is normal. No respiratory distress.      Breath sounds: Normal breath sounds. No stridor. No wheezing or rales.   Chest:      Chest wall: No tenderness.   Abdominal:      General: Bowel sounds are normal. There is no distension.      Palpations: Abdomen is soft. There is no mass.      Tenderness: There is abdominal tenderness. There is no guarding or rebound.      Hernia: No hernia is present.   Musculoskeletal:         General: Swelling and signs of injury present. No tenderness or deformity. Normal range of motion.      Cervical back: Normal range of motion and neck supple.      Right lower leg: No edema.      Comments:  Dressing over left foot noted.   Lymphadenopathy:      Cervical: No cervical adenopathy.   Skin:     General: Skin is warm and dry.      Coloration: Skin is not pale.      Findings: No erythema or rash.   Neurological:      General: No focal deficit present.      Mental Status: He is alert and oriented to person, place, and time.      Cranial Nerves: No cranial nerve deficit.      Sensory: No sensory deficit.      Motor: No abnormal muscle tone.      Coordination: Coordination normal.   Psychiatric:         Mood and Affect: Mood normal.         Behavior: Behavior normal.       Results Review:  I have reviewed the labs, radiology results, and diagnostic studies.    Laboratory Data:   Results from last 7 days   Lab Units 04/09/24  1747 04/09/24  1237 04/09/24  0611 04/08/24  1218 04/08/24  0623 04/07/24  1429 04/07/24  0422   WBC 10*3/mm3  --   --  4.35  --  6.19  --  5.70   HEMOGLOBIN g/dL 8.7* 8.4* 9.2*   < > 8.7*   < > 9.2*   HEMATOCRIT % 26.5* 26.5* 29.0*   < > 27.0*   < > 29.5*   PLATELETS 10*3/mm3  --   --  231  --  246  --  275    < > = values in this interval not displayed.        Results from last 7 days   Lab Units 04/09/24  0610 04/08/24  0623 04/07/24  0422 04/06/24  0340 04/05/24  0512   SODIUM mmol/L 141 141 141   < > 139  139   POTASSIUM mmol/L 4.1 4.0 4.0   < > 4.4  4.4   CHLORIDE mmol/L 104 104 103   < > 102  102   CO2 mmol/L 26.0 25.0 27.0   < > 25.0  25.0   BUN mg/dL 28* 30* 33*   < > 31*  31*   CREATININE mg/dL 1.84* 1.61* 1.75*   < > 1.98*  1.98*   CALCIUM mg/dL 9.4 9.1 10.1   < > 11.1*  11.1*   BILIRUBIN mg/dL  --  0.4  --   --  0.4   ALK PHOS U/L  --  107  --   --  138*   ALT (SGPT) U/L  --  14  --   --  18   AST (SGOT) U/L  --  16  --   --  22   GLUCOSE mg/dL 170* 82 94   < > 173*  173*    < > = values in this interval not displayed.       Culture Data:   Blood Culture   Date Value Ref Range Status   03/26/2024 Abnormal Stain (C)  Preliminary   03/26/2024 Abnormal Stain (C)  Preliminary        Radiology Data:   Imaging Results (Last 24 Hours)       ** No results found for the last 24 hours. **            I have reviewed the patient's current medications.     Assessment/Plan   Assessment  Active Hospital Problems    Diagnosis     **Sepsis     Diabetic foot infection     Chronic kidney disease (CKD), stage IV (severe)     Chronic diastolic heart failure     BPH without obstruction/lower urinary tract symptoms     Diabetic polyneuropathy associated with type 2 diabetes mellitus     Anemia due to chronic kidney disease     Essential hypertension     Type 2 diabetes mellitus with hyperglycemia, with long-term current use of insulin    MRSA bacteremia  Metabolic encephalopathy secondary to sepsis  WANDER on CKD    Treatment Plan  Continue p.o. Zyvox.  Infectious disease input appreciated.      Nephrology input appreciated.  Creatinine appears to be at baseline.    Gastroenterology input appreciated.  Bleeding area in the sigmoid colon was clipped.  Will discontinue aspirin and Eliquis.  Monitor hemoglobin and if greater than 8G/DL, plan for discharge tomorrow and repeat colonoscopy with poor prep as outpatient..  Monitor hemoglobin closely every 6 hours.  Transfuse if hemoglobin less than 7G/DL    Podiatry consultation input appreciated.  Will follow recommendations.  Continue wound dressings as per podiatry.    Nephrology and urology input appreciated.  Restart Bumex.    Pain control with opiate pain medications.    Insulin sliding scale and Levemir for type 2 diabetes mellitus    PT/OT/wound care consultations    DVT prophylaxis with SCDs    CODE STATUS is full code    Medical Decision Making  Number and Complexity of problems: 4 acute, severe, high complexity medical problems.  Multiple chronic medical problems.  Differential Diagnosis: None    Conditions and Status        Condition is worsening.     [facility] Data  External documents reviewed: None  Cardiac tracing (EKG, telemetry) interpretation:  None  Radiology interpretation: None  Labs reviewed: CBC, BMP, blood cultures reviewed by me  Any tests that were considered but not ordered: None     Decision rules/scores evaluated (example [id number], Wells, etc): N/A     Discussed with: Patient     Care Planning  Shared decision making: Patient and his wife  Code status and discussions: CODE STATUS is full code    Disposition  Social Determinants of Health that impact treatment or disposition: None  I expect the patient to be discharged to home in the next 24 hours    Electronically signed by Rene Maloney MD, 04/09/24, 22:20 CDT.

## 2024-04-10 NOTE — THERAPY TREATMENT NOTE
Acute Care - Physical Therapy Treatment Note  [facility]     Patient Name: Erick Luong  : 1955  MRN: [id number]  Today's Date: 4/10/2024      Visit Dx:     ICD-10-CM ICD-9-CM   1. Diabetic foot infection  E11.628 250.80    L08.9 686.9   2. Poorly controlled diabetes mellitus  E11.65 250.00   3. Impaired functional mobility and activity tolerance [Z74.09]  Z74.09 V49.89   4. Rectal bleeding  K62.5 569.3   5. Anemia due to stage 3 chronic kidney disease, unspecified whether stage 3a or 3b CKD  N18.30 285.21    D63.1 585.3     Patient Active Problem List   Diagnosis    Obesity, unspecified obesity severity, unspecified obesity type    Essential hypertension    Type 2 diabetes mellitus with hyperglycemia, with long-term current use of insulin    Nonsmoker    Anemia due to chronic kidney disease    Class 3 severe obesity due to excess calories with body mass index (BMI) of 40.0 to 44.9 in adult    Anasarca    Sleep apnea with use of continuous positive airway pressure (CPAP)    Medically noncompliant    Diabetic ulcer of left midfoot associated with type 2 diabetes mellitus, with fat layer exposed    Diabetic polyneuropathy associated with type 2 diabetes mellitus    Spinal stenosis in cervical region    Degeneration of cervical intervertebral disc    Cervical radiculopathy    Degeneration of lumbar or lumbosacral intervertebral disc    Cervical myelopathy    Bilateral carpal tunnel syndrome    CAD (coronary artery disease)    GERD without esophagitis    BPH without obstruction/lower urinary tract symptoms    Stage 3b chronic kidney disease    Chronic diastolic heart failure    Type 2 myocardial infarction due to heart failure    Left carpal tunnel syndrome    Syncope and collapse, recurrent episodes    Poorly-controlled hypertension    Rhinovirus    Peripheral vascular disease    Chronic kidney disease (CKD), stage IV (severe)    Diabetic foot infection    Sepsis     Past Medical History:   Diagnosis Date     Arthritis     Autonomic disease     CAD (coronary artery disease) 02/06/2017    Cervical radiculopathy 09/16/2021    Chronic constipation with acute exaccerbation 05/10/2021    Coronary artery disease     Degeneration of cervical intervertebral disc 08/11/2021    Diabetes mellitus     Diabetic foot ulcer 08/31/2020    Diabetic polyneuropathy associated with type 2 diabetes mellitus 01/18/2021    Elevated cholesterol     Gastroesophageal reflux disease 05/13/2019    Headache     HTN (hypertension), benign 05/03/2017    Hyperlipidemia     Hypertension     Mixed hyperlipidemia 02/07/2017    Multiple lung nodules 01/26/2020    5mm, 9 mm RLL identified 1/2020, not present 10/2019.    Myocardial infarction     Osteomyelitis 01/22/2020    Osteomyelitis of fifth toe of right foot 10/07/2019    Pancreatitis     Persistent insomnia 01/20/2020    Renal disorder     Sleep apnea 02/06/2017    Sleep apnea with use of continuous positive airway pressure (CPAP)     NON-COMPLIANT    Slow transit constipation 01/16/2019    Spinal stenosis in cervical region 09/16/2021    Vitamin D deficiency 03/02/2021     Past Surgical History:   Procedure Laterality Date    ABDOMINAL SURGERY      AMPUTATION FOOT / TOE Left 10/2021    5th digit     ANTERIOR CERVICAL DISCECTOMY W/ FUSION N/A 8/5/2022    Procedure: CERVICAL DISCECTOMY ANTERIOR WITH FUSION C5-6 with possible plating of C5-7 with neuromonitoring and 1 c-arm;  Surgeon: Karel Soliz MD;  Location:  [facility] OR;  Service: Neurosurgery;  Laterality: N/A;    APPENDECTOMY      BACK SURGERY      CARDIAC CATHETERIZATION Left 02/08/2021    Procedure: Left Heart Cath w poss intervention left anatomical snuff box acess;  Surgeon: Omkar Charles DO;  Location:  [facility] CATH INVASIVE LOCATION;  Service: Cardiology;  Laterality: Left;    CARDIAC SURGERY      CATARACT EXTRACTION      CERVICAL SPINE SURGERY      COLONOSCOPY N/A 01/31/2017    Normal exam repeat in 5 years    COLONOSCOPY N/A  02/11/2019    Mild acute inflammation    COLONOSCOPY N/A 4/7/2024    Procedure: COLONOSCOPY WITH ANESTHESIA;  Surgeon: Raymond Mederos MD;  Location: [facility] OR;  Service: Gastroenterology;  Laterality: N/A;  Pre: Anemia, Rectal bleed;  Post: Visible vessel at 20cm, clip x6 placed;  Del Shetty MD    COLONOSCOPY W/ POLYPECTOMY  03/04/2014    Hyperplastic polyp    CORONARY ARTERY BYPASS GRAFT  10/2015    ENDOSCOPY  04/13/2011    Gastritis with hemorrhage    ENDOSCOPY N/A 05/05/2017    Normal exam    ENDOSCOPY N/A 02/11/2019    Gastritis    ENDOSCOPY N/A 09/01/2020    Non-erosive gastritis with hemorrhage    ENDOSCOPY N/A 02/10/2021    Esophagitis    FOOT SURGERY Left     INCISION AND DRAINAGE OF WOUND Left 09/2007    spider bite     PT Assessment (Last 12 Hours)       PT Evaluation and Treatment       Row Name 04/10/24 0841          Physical Therapy Time and Intention    Subjective Information complains of;nausea/vomiting;pain  -LY     Document Type therapy note (daily note)  -LY       Row Name 04/10/24 0841          General Information    Existing Precautions/Restrictions fall  LLE WBAT in CAM boot  -LY       Row Name 04/10/24 0841          Pain    Pretreatment Pain Rating 6/10  -LY     Posttreatment Pain Rating 6/10  -LY     Pain Location lower  -LY     Pain Location - abdomen  -LY     Pre/Posttreatment Pain Comment nausea  -LY     Pain Intervention(s) Medication (See MAR);Ambulation/increased activity  -LY       Row Name 04/10/24 0841          Bed Mobility    Supine-Sit [place] (Bed Mobility) standby assist;verbal cues  -LY     Sit-Supine [place] (Bed Mobility) verbal cues;moderate assist (50% patient effort)  -LY     Bed Mobility, Safety Issues decreased use of legs for bridging/pushing  -LY     Assistive Device (Bed Mobility) bed rails;head of bed elevated  -LY     Comment, (Bed Mobility) assist with BLEs back to bed  -LY       Row Name 04/10/24 0841          Sit-Stand Transfer    Sit-Stand  [place] (Transfers) contact guard;verbal cues  -LY     Assistive Device (Sit-Stand Transfers) walker, front-wheeled  -LY       Row Name 04/10/24 0841          Stand-Sit Transfer    Stand-Sit [place] (Transfers) verbal cues;contact guard  -LY     Assistive Device (Stand-Sit Transfers) walker, front-wheeled  -LY       Row Name 04/10/24 0841          Gait/Stairs (Locomotion)    [place] Level (Gait) verbal cues;contact guard  -LY     Assistive Device (Gait) walker, front-wheeled  -LY     Distance in Feet (Gait) 120  -LY     Pattern (Gait) step-through  -LY     Deviations/Abnormal Patterns (Gait) gait speed decreased;stride length decreased  -LY     Bilateral Gait Deviations forward flexed posture  -LY       Row Name             Wound 06/15/23 0102 Left anterior plantar    Wound - Properties Group Placement Date: 06/15/23  -SM Placement Time: 0102  -SM Side: Left  -SM Orientation: anterior  -SM Location: plantar  -SM    Retired Wound - Properties Group Placement Date: 06/15/23  -SM Placement Time: 0102  -SM Side: Left  -SM Orientation: anterior  -SM Location: plantar  -SM    Retired Wound - Properties Group Date first assessed: 06/15/23  -SM Time first assessed: 0102  -SM Side: Left  -SM Location: plantar  -SM      Row Name             Wound 08/22/23 0140 Left posterior plantar    Wound - Properties Group Placement Date: 08/22/23  -AM Placement Time: 0140  -AM Present on Original Admission: Y  -AM Side: Left  -AM Orientation: posterior  -AM Location: plantar  -AM    Retired Wound - Properties Group Placement Date: 08/22/23  -AM Placement Time: 0140  -AM Present on Original Admission: Y  -AM Side: Left  -AM Orientation: posterior  -AM Location: plantar  -AM    Retired Wound - Properties Group Date first assessed: 08/22/23  -AM Time first assessed: 0140  -AM Present on Original Admission: Y  -AM Side: Left  -AM Location: plantar  -AM      Row Name             Wound Left lateral foot Diabetic Ulcer    Wound -  Properties Group Side: Left  -MH Orientation: lateral  - Location: foot  -JACIEL, 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;diabetic foot ulcer/gangrene  Primary Wound Type: Diabetic ulc  -JACIEL Wound Outcome: Amputation  -JACIEL Stage, Pressure Injury : other (see comments)  -JACIEL, full thickness starting 10/20/21     Retired Wound - Properties Group Side: Left  -MH Orientation: lateral  -MH Location: foot  -JACIEL, 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;diabetic foot ulcer/gangrene  Primary Wound Type: Diabetic ulc  -JACIEL Stage, Pressure Injury : other (see comments)  -JACIEL, full thickness starting 10/20/21  Wound Outcome: Amputation  -JACIEL    Retired Wound - Properties Group Side: Left  -MH Location: foot  -JACIEL, 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;diabetic foot ulcer/gangrene  Primary Wound Type: Diabetic ulc  -JACIEL Wound Outcome: Amputation  -JACIEL      Row Name             Wound 04/04/24 2100 Left posterior wrist Skin Tear    Wound - Properties Group Placement Date: 04/04/24  -NR Placement Time: 2100  -NR Present on Original Admission: N  -NR Side: Left  -NR Orientation: posterior  -NR Location: wrist  -NR Primary Wound Type: Skin tear  -NR Additional Comments: likely caused by wristband. Band removed, site cleaned with alcohol swab, wraped in gauze  -NR    Retired Wound - Properties Group Placement Date: 04/04/24  -NR Placement Time: 2100  -NR Present on Original Admission: N  -NR Side: Left  -NR Orientation: posterior  -NR Location: wrist  -NR Primary Wound Type: Skin tear  -NR Additional Comments: likely caused by wristband. Band removed, site cleaned with alcohol swab, wraped in gauze  -NR    Retired Wound - Properties Group Date first assessed: 04/04/24  -NR Time first assessed: 2100  -NR Present on Original Admission: N  -NR Side: Left  -NR Location: wrist  -NR Primary Wound Type: Skin tear  -NR Additional Comments: likely caused by wristband. Band removed, site cleaned with alcohol swab, wraped in gauze  -NR      Row Name 04/10/24 0841          Plan of Care Review    Plan of  Care Reviewed With patient  -LY     Progress improving  -LY     Outcome Evaluation PT tx completed. Pt in bed, c/o nausea. Nsg already given meds. Agreed to ambulate. SBA for supine to sit. Able to stand with CGA, cues for safety. Amb 120' with RWX and CGA. Occasional cues for RWX placement. Mod x1 for sit to supine d/t assist with BLEs. Will cont to follow.  -LY       Row Name 04/10/24 0841          Positioning and Restraints    Pre-Treatment Position in bed  -LY     Post Treatment Position bed  -LY     In Bed fowlers;call light within reach;encouraged to call for assist;exit alarm on;patient within staff view  -LY               User Key  (r) = Recorded By, (t) = Taken By, (c) = Cosigned By      Initials Name Provider Type    Yuliana Lisa, RN Registered Nurse    MH Haase, Mallory L, RN Registered Nurse    Alesha Armenta, PTA Physical Therapist Assistant    Faviola Kunz RN Registered Nurse    Michelle Diaz RN Registered Nurse    Reuben Johnson RN Registered Nurse                    Physical Therapy Education       Title: PT OT SLP Therapies (In Progress)       Topic: Physical Therapy (In Progress)       Point: Mobility training (Done)       Learning Progress Summary             Patient Acceptance, E, VU by YIMI at 4/9/2024 0957    Comment: Pt educated on continued POC, plans for d/c, and informed of progress made to date.    Acceptance, E,TB,D, VU,NR by NIKO at 3/29/2024 1009    Comment: education re: purpose of PT/importance of activity, safety/falls prevention, NWB L [place], improved tech w/ bed mobility, positioning for pressure relief                         Point: Home exercise program (Not Started)       Learner Progress:  Not documented in this visit.              Point: Precautions (Done)       Learning Progress Summary             Patient Acceptance, E, VU by YIMI at 4/9/2024 0957    Comment: Pt educated on continued POC, plans for d/c, and informed of progress made to date.     Acceptance, E,TB,D, VU,NR by  at 3/29/2024 1009    Comment: education re: purpose of PT/importance of activity, safety/falls prevention, NWB L LE, improved tech w/ bed mobility, positioning for pressure relief                                         User Key       Initials Effective Dates Name Provider Type Discipline     08/02/18 -  Melly Mustafa, PT Physical Therapist PT     02/12/24 -  Kevin Nogueira, PT Student PT Student PT                  PT Recommendation and Plan     Plan of Care Reviewed With: patient  Progress: improving  Outcome Evaluation: PT tx completed. Pt in bed, c/o nausea. Nsg already given meds. Agreed to ambulate. SBA for supine to sit. Able to stand with CGA, cues for safety. Amb 120' with RWX and CGA. Occasional cues for RWX placement. Mod x1 for sit to supine d/t assist with BLEs. Will cont to follow.       Time Calculation:    PT Charges       Row Name 04/10/24 0858             Time Calculation    Start Time 0841  -LY      Stop Time 0855  -LY      Time Calculation (min) 14 min  -LY      PT Received On 04/10/24  -LY         Time Calculation- PT    Total Timed Code Minutes- PT 14 minute(s)  -LY         Timed Charges    93930 - Gait Training Minutes  14  -LY         Total Minutes    Timed Charges Total Minutes 14  -LY       Total Minutes 14  -LY                User Key  (r) = Recorded By, (t) = Taken By, (c) = Cosigned By      Initials Name Provider Type    Alesha Armenta PTA Physical Therapist Assistant                  Therapy Charges for Today       Code Description Service Date Service Provider Modifiers Qty    06039149623 HC PT THER PROC EA 15 MIN 4/9/2024 Alesha Dominguez PTA GP 1    28825695803 HC GAIT TRAINING EA 15 MIN 4/9/2024 Alesha Dominguez PTA GP 1    68440781558 HC GAIT TRAINING EA 15 MIN 4/10/2024 Alesha Dominguez, PARUL GP 1            PT G-Codes  Outcome Measure Options: AM-PAC 6 Clicks Daily Activity (OT)  AM-PAC 6 Clicks Score (PT): 20  AM-PAC 6 Clicks Score (OT):  16    Alesha Dominguez, PTA  4/10/2024

## 2024-04-10 NOTE — PROGRESS NOTES
[facility] Medicine Services  INPATIENT PROGRESS NOTE    Patient Name: Erick Luong  Date of Admission: 3/26/2024  Today's Date: 04/10/24  Length of Stay: 15  Primary Care Physician: Del Shetty MD    Subjective   Chief Complaint: Altered mental status    HPI:  4/10  Patient's hemoglobin has drifted down to 2 8.2 G/DL today.  Continues to have epigastric pain and nausea today.  However no vomiting and he was able to tolerate breakfast.    Review of Systems   All pertinent negatives and positives are as above. All other systems have been reviewed and are negative unless otherwise stated.     Objective    Temp:  [97.3 °F (36.3 °C)-98.2 °F (36.8 °C)] 97.6 °F (36.4 °C)  Heart Rate:  [59-78] 75  Resp:  [16-18] 16  BP: (109-133)/(42-85) 113/47  Physical Exam  Constitutional:       General: He is not in acute distress.     Appearance: He is well-developed. He is not diaphoretic.   HENT:      Head: Normocephalic.   Eyes:      General: No scleral icterus.     Conjunctiva/sclera: Conjunctivae normal.      Pupils: Pupils are equal, round, and reactive to light.   Neck:      Thyroid: No thyromegaly.      Vascular: No JVD.      Trachea: No tracheal deviation.   Cardiovascular:      Rate and Rhythm: Normal rate and regular rhythm.      Heart sounds: Normal heart sounds. No murmur heard.     No friction rub. No gallop.   Pulmonary:      Effort: Pulmonary effort is normal. No respiratory distress.      Breath sounds: Normal breath sounds. No stridor. No wheezing or rales.   Chest:      Chest wall: No tenderness.   Abdominal:      General: Bowel sounds are normal. There is no distension.      Palpations: Abdomen is soft. There is no mass.      Tenderness: There is abdominal tenderness. There is no guarding or rebound.      Hernia: No hernia is present.   Musculoskeletal:         General: Swelling and signs of injury present. No tenderness or deformity. Normal range of motion.      Cervical  back: Normal range of motion and neck supple.      Right lower leg: No edema.      Comments: Dressing over left foot noted.   Lymphadenopathy:      Cervical: No cervical adenopathy.   Skin:     General: Skin is warm and dry.      Coloration: Skin is not pale.      Findings: No erythema or rash.   Neurological:      General: No focal deficit present.      Mental Status: He is alert and oriented to person, place, and time.      Cranial Nerves: No cranial nerve deficit.      Sensory: No sensory deficit.      Motor: No abnormal muscle tone.      Coordination: Coordination normal.   Psychiatric:         Mood and Affect: Mood normal.         Behavior: Behavior normal.       Results Review:  I have reviewed the labs, radiology results, and diagnostic studies.    Laboratory Data:   Results from last 7 days   Lab Units 04/10/24  0552 04/10/24  0004 04/09/24  1747 04/09/24  1237 04/09/24  0611 04/08/24  1218 04/08/24  0623   WBC 10*3/mm3 4.60  --   --   --  4.35  --  6.19   HEMOGLOBIN g/dL 8.2* 8.0* 8.7*   < > 9.2*   < > 8.7*   HEMATOCRIT % 26.3* 25.6* 26.5*   < > 29.0*   < > 27.0*   PLATELETS 10*3/mm3 173  --   --   --  231  --  246    < > = values in this interval not displayed.        Results from last 7 days   Lab Units 04/10/24  0553 04/09/24  0610 04/08/24  0623 04/06/24  0340 04/05/24  0512   SODIUM mmol/L 141 141 141   < > 139  139   POTASSIUM mmol/L 4.1 4.1 4.0   < > 4.4  4.4   CHLORIDE mmol/L 105 104 104   < > 102  102   CO2 mmol/L 26.0 26.0 25.0   < > 25.0  25.0   BUN mg/dL 27* 28* 30*   < > 31*  31*   CREATININE mg/dL 1.85* 1.84* 1.61*   < > 1.98*  1.98*   CALCIUM mg/dL 9.2 9.4 9.1   < > 11.1*  11.1*   BILIRUBIN mg/dL  --   --  0.4  --  0.4   ALK PHOS U/L  --   --  107  --  138*   ALT (SGPT) U/L  --   --  14  --  18   AST (SGOT) U/L  --   --  16  --  22   GLUCOSE mg/dL 124* 170* 82   < > 173*  173*    < > = values in this interval not displayed.       Culture Data:   Blood Culture   Date Value Ref Range  Status   03/26/2024 Abnormal Stain (C)  Preliminary   03/26/2024 Abnormal Stain (C)  Preliminary       Radiology Data:   Imaging Results (Last 24 Hours)       ** No results found for the last 24 hours. **            I have reviewed the patient's current medications.     Assessment/Plan   Assessment  Active Hospital Problems    Diagnosis     **Sepsis     Diabetic foot infection     Chronic kidney disease (CKD), stage IV (severe)     Chronic diastolic heart failure     BPH without obstruction/lower urinary tract symptoms     Diabetic polyneuropathy associated with type 2 diabetes mellitus     Anemia due to chronic kidney disease     Essential hypertension     Type 2 diabetes mellitus with hyperglycemia, with long-term current use of insulin    MRSA bacteremia  Metabolic encephalopathy secondary to sepsis  WANDER on CKD    Treatment Plan  Continue p.o. Zyvox.  Infectious disease input appreciated.      Nephrology input appreciated.  Creatinine appears to be at baseline.  Continue p.o. Bumex 2 mg daily.    Patient continues to have epigastric and periumbilical pain and nausea.  No overt signs of bleeding, however hemoglobin is drifted down to 8.2G/DL today.  The patient does have a history of esophagitis in the past on previous upper GI endoscopies.  He is currently on sucralfate as well as p.o. Protonix 40 mg every 12 hours.  I discussed with gastroenterologist and we will plan an EGD tomorrow morning.  N.p.o. for midnight.    Of note, patient had colonoscopy which was limited due to poor prep.  However, a bleeding area in the sigmoid colon was clipped. Will discontinue aspirin and Eliquis. Monitor hemoglobin closely every 6 hours.  Transfuse if hemoglobin less than 7G/DL    Podiatry consultation input appreciated.  Will follow recommendations.  Continue wound dressings as per podiatry.    Urology input appreciated.  No signs of urinary retention at this time.  Continue to monitor.    Pain control with opiate pain  medications.    Insulin sliding scale and Levemir for type 2 diabetes mellitus    PT/OT/wound care consultations    DVT prophylaxis with SCDs    CODE STATUS is full code    Medical Decision Making  Number and Complexity of problems: 4 acute, severe, high complexity medical problems.  Multiple chronic medical problems.  Differential Diagnosis: None    Conditions and Status        Condition is worsening.     [facility] Data  External documents reviewed: None  Cardiac tracing (EKG, telemetry) interpretation: None  Radiology interpretation: None  Labs reviewed: CBC, BMP, blood cultures reviewed by me  Any tests that were considered but not ordered: None     Decision rules/scores evaluated (example [id number], [place], etc): N/A     Discussed with: Patient     Care Planning  Shared decision making: Patient and his wife  Code status and discussions: CODE STATUS is full code    Disposition  Social Determinants of Health that impact treatment or disposition: None  I expect the patient to be discharged to home in the next 1 to 2 days    Electronically signed by Rene Maloney MD, 04/10/24, 12:21 CDT.

## 2024-04-10 NOTE — PLAN OF CARE
Goal Outcome Evaluation:  Plan of Care Reviewed With: patient        Progress: improving  Outcome Evaluation: OT tx completed. Pt seated in recliner upon therapist arrival; A&Ox4; c/o 8/10 abdominal pain and nausea. Pt performed all sit<>stand transfers and ambulated throughout room utilizing rwx with SBA. Pt performed face washing task in unsupported standing position at sink with SBA. Pt's overall cognition has significantly improved this week which has improved Pt's safety awareness and sequencing abilities though Pt does still demo decreased safety awareness. Pt continues to benefit from skilled OT intervention in order to address remaining deficits in fxl mobility, fxl activity tolerance, balance, strength, and use of adaptive techniques/equipment during performance of BADLs. Recommend sub acute rehab vs home with assist and HH at discharge pending progress.      Anticipated Discharge Disposition (OT): sub acute care setting, home with assist, home with home health

## 2024-04-10 NOTE — PROGRESS NOTES
Adult Nutrition  Assessment/PES    Patient Name:  Erick Luong  YOB: 1955  MRN: [id number]  Admit Date:  3/26/2024    Assessment Date:  4/10/2024     Reason for Assessment       Row Name 04/10/24 1107          Reason for Assessment    Reason For Assessment follow-up protocol                    Nutrition/Diet History       Row Name 04/10/24 1143          Nutrition/Diet History    Typical Intake (Food/Fluid/EN/PN) NTN follow-up. Pt was sitting in chair at time of visit. Pt reports of poor appetite due to abdominal pain. Encouraged intake. Pt stated he his not consuming ONS and  request them to be removed. Discontinuing ONS. Continue to encourage intake. Will continue to follow per protocol.     Factors Affecting Nutritional Intake appetite;pain                    Labs/Tests/Procedures/Meds       Row Name 04/10/24 1108          Labs/Procedures/Meds    Lab Results Reviewed reviewed, pertinent     Lab Results Comments BUN, Cr, GFR, Glu, H/H        Diagnostic Tests/Procedures    Diagnostic Test/Procedure Reviewed reviewed        Medications    Pertinent Medications Reviewed reviewed     Pertinent Medications Comments see MAR                    Physical Findings       Row Name 04/10/24 1108          Physical Findings    Overall Physical Appearance Wound-left posterior plantar. WOund-left anterior plantar. Wound-left lateral foot diabetic ulcer. Room air. Edema. last BM 4/9.        Fluid Accumulation    Location (Edema) generalized;lower extremity, right;foot/ankle, left;foot/ankle, right;lower extremity, left;upper extremity, left;upper extremity, right;hand, left;hand, right     Generalized (Edema) 2-->mild     Upper Extremity, Left (Edema) 3-->moderate     Upper Extremity, Right (Edema) 3-->moderate     Hand, Left (Edema) 3-->moderate     Hand, Right (Edema) 3-->moderate     Lower Extremity, Left (Edema) 3-->moderate     Lower Extremity, Right (Edema) 3-->moderate     Foot/Ankle, Left (Edema)  3-->moderate     Foot/Ankle, Right (Edema) 3-->moderate                      Nutrition Prescription Ordered       Row Name 04/10/24 1109          Nutrition Prescription PO    Current PO Diet Regular     Fluid Consistency Thin     Supplement Boost Glucose Control (Glucerna Shake)     Supplement Frequency 3 times a day     Common Modifiers Cardiac;Consistent Carbohydrate                    Evaluation of Received Nutrient/Fluid Intake       Row Name 04/10/24 1109          Nutrient/Fluid Evaluation    Number of Days Evaluated 3 days     Additional Documentation Fluid Intake Evaluation (Group)        Fluid Intake Evaluation    Oral Fluid (mL) 120        PO Evaluation    Number of Days PO Intake Evaluated 1 day     Number of Meals 1     % PO Intake 100                       Problem/Interventions:   Problem 1       Row Name 04/10/24 1143          Nutrition Diagnoses Problem 1    Problem 1 Inadequate Nutrient Intake     Etiology (related to) Factors Affecting Nutrition     Reported/Observed By RD/Tech     Mental State/Condition Discomfort     Signs/Symptoms (evidenced by) Report of Mnimal PO Intake;PO Intake;Report/Observation     Percent (%) intake recorded 100 %     Over number of meals 1     Appetite Poor at this time                          Intervention Goal       Row Name 04/10/24 1143          Intervention Goal    General Reduce/improve symptoms;Meet nutritional needs for age/condition;Disease management/therapy     PO Increase intake     Weight Appropriate weight loss                    Nutrition Intervention       Row Name 04/10/24 1143          Nutrition Intervention    RD/Tech Action Care plan reviewd;Follow Tx progress                      Education/Evaluation       Row Name 04/10/24 1143          Education    Education No discharge needs identified at this time        Monitor/Evaluation    Monitor Per protocol                     Electronically signed by:  Igor Schroeder RD  04/10/24 11:44 CDT

## 2024-04-10 NOTE — PLAN OF CARE
Goal Outcome Evaluation:  Plan of Care Reviewed With: patient        Progress: no change  Outcome Evaluation: A&OX4, VSS. Assist x1 and walker to ambulate, up in chair most of day. ACHS accuchecks with SSI. Left foot wound dressing changed, CDI. On room air, c/o back pain x1-PRN pain med given with relief. C/o nausea x1, Zofran given with relief., tolerated meals w/o difficulty. NPO at midnight for EGD 4/11. Consent signed and in chart. Contact Isolation maintained. Alarms set, call light in reach. Safety maintained and continue to monitor.

## 2024-04-10 NOTE — THERAPY TREATMENT NOTE
Patient Name: Erick Luong  : 1955    MRN: [id number]                              Today's Date: 4/10/2024       Admit Date: 3/26/2024    Visit Dx:     ICD-10-CM ICD-9-CM   1. Diabetic foot infection  E11.628 250.80    L08.9 686.9   2. Poorly controlled diabetes mellitus  E11.65 250.00   3. Impaired functional mobility and activity tolerance [Z74.09]  Z74.09 V49.89   4. Rectal bleeding  K62.5 569.3   5. Anemia due to stage 3 chronic kidney disease, unspecified whether stage 3a or 3b CKD  N18.30 285.21    D63.1 585.3     Patient Active Problem List   Diagnosis    Obesity, unspecified obesity severity, unspecified obesity type    Essential hypertension    Type 2 diabetes mellitus with hyperglycemia, with long-term current use of insulin    Nonsmoker    Anemia due to chronic kidney disease    Class 3 severe obesity due to excess calories with body mass index (BMI) of 40.0 to 44.9 in adult    Anasarca    Sleep apnea with use of continuous positive airway pressure (CPAP)    Medically noncompliant    Diabetic ulcer of left midfoot associated with type 2 diabetes mellitus, with fat layer exposed    Diabetic polyneuropathy associated with type 2 diabetes mellitus    Spinal stenosis in cervical region    Degeneration of cervical intervertebral disc    Cervical radiculopathy    Degeneration of lumbar or lumbosacral intervertebral disc    Cervical myelopathy    Bilateral carpal tunnel syndrome    CAD (coronary artery disease)    GERD without esophagitis    BPH without obstruction/lower urinary tract symptoms    Stage 3b chronic kidney disease    Chronic diastolic heart failure    Type 2 myocardial infarction due to heart failure    Left carpal tunnel syndrome    Syncope and collapse, recurrent episodes    Poorly-controlled hypertension    Rhinovirus    Peripheral vascular disease    Chronic kidney disease (CKD), stage IV (severe)    Diabetic foot infection    Sepsis     Past Medical History:   Diagnosis Date     Arthritis     Autonomic disease     CAD (coronary artery disease) 02/06/2017    Cervical radiculopathy 09/16/2021    Chronic constipation with acute exaccerbation 05/10/2021    Coronary artery disease     Degeneration of cervical intervertebral disc 08/11/2021    Diabetes mellitus     Diabetic foot ulcer 08/31/2020    Diabetic polyneuropathy associated with type 2 diabetes mellitus 01/18/2021    Elevated cholesterol     Gastroesophageal reflux disease 05/13/2019    Headache     HTN (hypertension), benign 05/03/2017    Hyperlipidemia     Hypertension     Mixed hyperlipidemia 02/07/2017    Multiple lung nodules 01/26/2020    5mm, 9 mm RLL identified 1/2020, not present 10/2019.    Myocardial infarction     Osteomyelitis 01/22/2020    Osteomyelitis of fifth toe of right foot 10/07/2019    Pancreatitis     Persistent insomnia 01/20/2020    Renal disorder     Sleep apnea 02/06/2017    Sleep apnea with use of continuous positive airway pressure (CPAP)     NON-COMPLIANT    Slow transit constipation 01/16/2019    Spinal stenosis in cervical region 09/16/2021    Vitamin D deficiency 03/02/2021     Past Surgical History:   Procedure Laterality Date    ABDOMINAL SURGERY      AMPUTATION FOOT / TOE Left 10/2021    5th digit     ANTERIOR CERVICAL DISCECTOMY W/ FUSION N/A 8/5/2022    Procedure: CERVICAL DISCECTOMY ANTERIOR WITH FUSION C5-6 with possible plating of C5-7 with neuromonitoring and 1 c-arm;  Surgeon: Karel Soliz MD;  Location:  [facility] OR;  Service: Neurosurgery;  Laterality: N/A;    APPENDECTOMY      BACK SURGERY      CARDIAC CATHETERIZATION Left 02/08/2021    Procedure: Left Heart Cath w poss intervention left anatomical snuff box acess;  Surgeon: Omkar Charles DO;  Location:  [facility]ATH [facility];  Service: Cardiology;  Laterality: Left;    CARDIAC SURGERY      CATARACT EXTRACTION      CERVICAL SPINE SURGERY      COLONOSCOPY N/A 01/31/2017    Normal exam repeat in 5 years    COLONOSCOPY N/A  02/11/2019    Mild acute inflammation    COLONOSCOPY N/A 4/7/2024    Procedure: COLONOSCOPY WITH ANESTHESIA;  Surgeon: Raymond Mederos MD;  Location: [facility];  Service: Gastroenterology;  Laterality: N/A;  Pre: Anemia, Rectal bleed;  Post: Visible vessel at 20cm, clip x6 placed;  Del Shetty MD    COLONOSCOPY W/ POLYPECTOMY  03/04/2014    Hyperplastic polyp    CORONARY ARTERY BYPASS GRAFT  10/2015    ENDOSCOPY  04/13/2011    Gastritis with hemorrhage    ENDOSCOPY N/A 05/05/2017    Normal exam    ENDOSCOPY N/A 02/11/2019    Gastritis    ENDOSCOPY N/A 09/01/2020    Non-erosive gastritis with hemorrhage    ENDOSCOPY N/A 02/10/2021    Esophagitis    FOOT SURGERY Left     INCISION AND DRAINAGE OF WOUND Left 09/2007    spider bite      General Information       Row Name 04/10/24 1132          OT Time and Intention    Document Type therapy note (daily note)  -LS     Mode of Treatment occupational therapy  -LS       Row Name 04/10/24 1132          General Information    Patient Profile Reviewed yes  -LS     Existing Precautions/Restrictions fall  LLE WBAT in CAM boot  -LS       Row Name 04/10/24 1132          Cognition    Orientation Status (Cognition) oriented x 4  -LS       Row Name 04/10/24 1132          Safety Issues, Functional Mobility    Safety Issues Affecting Function (Mobility) insight into deficits/self-awareness;safety precautions follow-through/compliance  -LS     Impairments Affecting Function (Mobility) balance;endurance/activity tolerance;strength;pain;sensation/sensory awareness;range of motion (ROM)  -LS     Cognitive Impairments, Mobility Safety/Performance insight into deficits/self-awareness;safety precaution follow-through  -LS               User Key  (r) = Recorded By, (t) = Taken By, (c) = Cosigned By      Initials Name Provider Type    LS Alesha Barrett OTR/L Occupational Therapist                     Mobility/ADL's       Row Name 04/10/24 1132          Transfers    Transfers sit-stand  transfer;stand-sit transfer  -LS       Row Name 04/10/24 1132          Sit-Stand Transfer    Sit-Stand [place] (Transfers) standby assist  -LS       Row Name 04/10/24 1132          Stand-Sit Transfer    Stand-Sit [place]kins (Transfers) standby assist  -LS       Row Name 04/10/24 1132          Functional Mobility    Functional Mobility- Ind. Level standby assist  -LS     Functional Mobility- Device walker, front-wheeled  -LS     Patient was able to Ambulate yes  -LS       Row Name 04/10/24 1132          Activities of Daily Living    BADL Assessment/Intervention grooming  -       Row Name 04/10/24 1132          Mobility    Extremity Weight-bearing Status left lower extremity  -LS     Left Lower Extremity (Weight-bearing Status) weight-bearing as tolerated (WBAT);other (see comments)  in CAM boot  -       Row Name 04/10/24 1132          Grooming Assessment/Training    [place] Level (Grooming) grooming skills;wash face, hands;standby assist  -LS     Position (Grooming) sink side;unsupported standing  -LS               User Key  (r) = Recorded By, (t) = Taken By, (c) = Cosigned By      Initials Name Provider Type    Alesha Bloom, OTR/L Occupational Therapist                   Obj/Interventions    No documentation.                  Goals/Plan    No documentation.                  Clinical Impression       Row Name 04/10/24 1132          Pain Assessment    Pretreatment Pain Rating 8/10  -LS     Posttreatment Pain Rating 8/10  -LS     Pain Location lower  -LS     Pain Location - abdomen  -LS     Pain Intervention(s) Repositioned;Ambulation/increased activity  -       Row Name 04/10/24 1132          Plan of Care Review    Plan of Care Reviewed With patient  -LS     Progress improving  -LS     Outcome Evaluation OT tx completed. Pt seated in recliner upon therapist arrival; A&Ox4; c/o 8/10 abdominal pain and nausea. Pt performed all sit<>stand transfers and ambulated throughout room utilizing rwx with  SBA. Pt performed face washing task in unsupported standing position at sink with SBA. Pt's overall cognition has significantly improved this week which has improved Pt's safety awareness and sequencing abilities though Pt does still demo decreased safety awareness. Pt continues to benefit from skilled OT intervention in order to address remaining deficits in fxl mobility, fxl activity tolerance, balance, strength, and use of adaptive techniques/equipment during performance of BADLs. Recommend sub acute rehab vs home with assist and HH at discharge pending progress.  -       Row Name 04/10/24 1132          Therapy Plan Review/Discharge Plan (OT)    Anticipated Discharge Disposition (OT) sub acute care setting;home with assist;home with home health  -LS       Row Name 04/10/24 1132          Positioning and Restraints    Pre-Treatment Position sitting in chair/recliner  -LS     Post Treatment Position chair  -LS     In Chair reclined;call light within reach;encouraged to call for assist;exit alarm on;legs elevated  -LS               User Key  (r) = Recorded By, (t) = Taken By, (c) = Cosigned By      Initials Name Provider Type    LS Alesha Barrett, OTR/L Occupational Therapist                   Outcome Measures       Row Name 04/10/24 1132          How much help from another is currently needed...    Putting on and taking off regular lower body clothing? 2  -LS     Bathing (including washing, rinsing, and drying) 2  -LS     Toileting (which includes using toilet bed pan or urinal) 2  -LS     Putting on and taking off regular upper body clothing 3  -LS     Taking care of personal grooming (such as brushing teeth) 3  -LS     Eating meals 4  -LS     AM-PAC 6 Clicks Score (OT) 16  -LS       Row Name 04/10/24 0827 04/10/24 0500       How much help from another person do you currently need...    Turning from your back to your side while in flat bed without using bedrails? 4  -KW 4  -SC    Moving from lying on back to  sitting on the side of a flat bed without bedrails? 4  -KW 4  -SC    Moving to and from a bed to a chair (including a wheelchair)? 3  -KW 3  -SC    Standing up from a chair using your arms (e.g., wheelchair, bedside chair)? 4  -KW 4  -SC    Climbing 3-5 steps with a railing? 2  -KW 2  -SC    To walk in hospital room? 3  -KW 3  -SC    AM-PAC 6 Clicks Score (PT) 20  -KW 20  -SC    Highest Level of Mobility Goal 6 --> Walk 10 steps or more  -KW 6 --> Walk 10 steps or more  -SC      Row Name 04/10/24 1132          Functional Assessment    Outcome Measure Options AM-PAC 6 Clicks Daily Activity (OT)  -LS               User Key  (r) = Recorded By, (t) = Taken By, (c) = Cosigned By      Initials Name Provider Type    Deandra Contreras RN Registered Nurse    Carmela Rothman RN Registered Nurse    Alesha Bloom, OTR/L Occupational Therapist                    Occupational Therapy Education       Title: PT OT SLP Therapies (In Progress)       Topic: Occupational Therapy (In Progress)       Point: ADL training (Done)       Description:   Instruct learner(s) on proper safety adaptation and remediation techniques during self care or transfers.   Instruct in proper use of assistive devices.                  Learning Progress Summary             Patient Acceptance, E, VU,NR by LS at 4/9/2024 1053    Acceptance, E,D, VU,NR by LR at 4/8/2024 1629    Acceptance, E, VU,NR by LS at 4/5/2024 1240    Acceptance, E,D, NR,NL by LR at 4/4/2024 1325    Acceptance, E,D, VU,NR by LR at 4/3/2024 1119    Acceptance, E, VU,NR by LS at 4/2/2024 1128    Acceptance, E, VU,NR by  at 3/29/2024 1424    Comment: Role of OT, OT POC, fall prevention, benefits of ambulation, d/c recommendations                         Point: Home exercise program (Not Started)       Description:   Instruct learner(s) on appropriate technique for monitoring, assisting and/or progressing therapeutic exercises/activities.                  Learner Progress:  Not  documented in this visit.              Point: Precautions (Done)       Description:   Instruct learner(s) on prescribed precautions during self-care and functional transfers.                  Learning Progress Summary             Patient Acceptance, E, VU,NR by LS at 4/9/2024 1053    Acceptance, E,D, VU,NR by LR at 4/8/2024 1629    Acceptance, E, VU,NR by LS at 4/5/2024 1240    Acceptance, E,D, NR,NL by LR at 4/4/2024 1325    Acceptance, E,D, VU,NR by LR at 4/3/2024 1119    Acceptance, E, VU,NR by LS at 4/2/2024 1128    Acceptance, E, VU,NR by  at 3/29/2024 1424    Comment: Role of OT, OT POC, fall prevention, benefits of ambulation, d/c recommendations                         Point: Body mechanics (Done)       Description:   Instruct learner(s) on proper positioning and spine alignment during self-care, functional mobility activities and/or exercises.                  Learning Progress Summary             Patient Acceptance, E, VU,NR by LS at 4/9/2024 1053    Acceptance, E,D, VU,NR by LR at 4/8/2024 1629    Acceptance, E, VU,NR by LS at 4/5/2024 1240    Acceptance, E,D, NR,NL by LR at 4/4/2024 1325    Acceptance, E,D, VU,NR by LR at 4/3/2024 1119    Acceptance, E, VU,NR by  at 4/2/2024 1128    Acceptance, E, VU,NR by  at 3/29/2024 1424    Comment: Role of OT, OT POC, fall prevention, benefits of ambulation, d/c recommendations                                         User Key       Initials Effective Dates Name Provider Type Discipline     06/20/22 -  Alesha Barrett OTR/L Occupational Therapist OT    LR 04/25/23 -  Tabitha Webb OTR/L Occupational Therapist OT     01/09/24 -  Yulia Pritchett OT Student OT Student OT                  OT Recommendation and Plan     Plan of Care Review  Plan of Care Reviewed With: patient  Progress: improving  Outcome Evaluation: OT tx completed. Pt seated in recliner upon therapist arrival; A&Ox4; c/o 8/10 abdominal pain and nausea. Pt performed all sit<>stand  transfers and ambulated throughout room utilizing rwx with SBA. Pt performed face washing task in unsupported standing position at sink with SBA. Pt's overall cognition has significantly improved this week which has improved Pt's safety awareness and sequencing abilities though Pt does still demo decreased safety awareness. Pt continues to benefit from skilled OT intervention in order to address remaining deficits in fxl mobility, fxl activity tolerance, balance, strength, and use of adaptive techniques/equipment during performance of BADLs. Recommend sub acute rehab vs home with assist and HH at discharge pending progress.     Time Calculation:         Time Calculation- OT       Row Name 04/10/24 1132 04/10/24 0858          Time Calculation- OT    OT Start Time 1132  -LS --     OT Stop Time 1143  -LS --     OT Time Calculation (min) 11 min  -LS --     Total Timed Code Minutes- OT 11 minute(s)  -LS --     OT Received On 04/10/24  -LS --        Timed Charges    99597 - Gait Training Minutes  -- 14  -LY        Total Minutes    Timed Charges Total Minutes -- 14  -LY      Total Minutes -- 14  -LY               User Key  (r) = Recorded By, (t) = Taken By, (c) = Cosigned By      Initials Name Provider Type    LY Alesha Dominguez, PTA Physical Therapist Assistant    LS Alesha Barrett OTR/L Occupational Therapist                  Therapy Charges for Today       Code Description Service Date Service Provider Modifiers Qty    71876208799 HC OT SELF CARE/MGMT/TRAIN EA 15 MIN 4/9/2024 Alesha Barrett OTR/L GO 2    63625531106 HC OT SELF CARE/MGMT/TRAIN EA 15 MIN 4/10/2024 Alesha Barrett OTR/L GO 1                 Alesha Barrett OTR/EDVIN  4/10/2024

## 2024-04-10 NOTE — PLAN OF CARE
Goal Outcome Evaluation:  Plan of Care Reviewed With: patient        Progress: improving  Outcome Evaluation: PT tx completed. Pt in bed, c/o nausea. Nsg already given meds. Agreed to ambulate. SBA for supine to sit. Able to stand with CGA, cues for safety. Amb 120' with RWX and CGA. Occasional cues for RWX placement. Mod x1 for sit to supine d/t assist with BLEs. Will cont to follow.

## 2024-04-10 NOTE — PLAN OF CARE
Problem: Adult Inpatient Plan of Care  Goal: Plan of Care Review  Outcome: Ongoing, Progressing  Flowsheets (Taken 4/10/2024 0519)  Progress: improving  Plan of Care Reviewed With: patient  Outcome Evaluation: Pt complains of pain, see MAR. Up stand by assist. Voiding. Drsg changed as order this AM. Safety maintained.

## 2024-04-10 NOTE — PAYOR COMM NOTE
"  Erick Luong (68 y.o. Male)    [id number]    cont stay   Please review clinical for additional  days   [facility] phone    fax              Date of Birth   1955    Social Security Number       Address   [address] [place] [address]    Home Phone   [phone]    MRN   [id number]       Holiness   [facility]    Marital Status                               Admission Date   3/26/24    Admission Type   Emergency    Admitting Provider   Rene Maloney MD    Attending Provider   Rene Maloney MD    Department, Room/Bed   [facility] 3C, 372/1       Discharge Date       Discharge Disposition       Discharge Destination                                 Attending Provider: Rene Maloney MD    Allergies: Cefepime, Bactrim [Sulfamethoxazole-trimethoprim], Vancomycin, Zolpidem, Zolpidem Tartrate, Metronidazole    Isolation: Contact   Infection: MRSA (05/19/19), COVID (History) (08/08/22)   Code Status: CPR    Ht: 182.9 cm (72\")   Wt: 131 kg (289 lb 3.2 oz)    Admission Cmt: None   Principal Problem: Sepsis [A41.9]                   Active Insurance as of 3/26/2024       Primary Coverage       Payor Plan Insurance Group Employer/Plan Group    [employer] [facility] EMPLOYEE [id number]       Payor Plan Address Payor Plan Phone Number Payor Plan Fax Number Effective Dates    [address] [phone]  1/1/2022 - None Entered    [facility] [id number]         Subscriber Name Subscriber Birth Date Member ID       ZAINAB LUONG 11/27/1969 [id number]               Secondary Coverage       Payor Plan Insurance Group Employer/Plan Group    MEDICARE MEDICARE A & B        Payor Plan Address Payor Plan Phone Number Payor Plan Fax Number Effective Dates    [address] [phone]  7/1/2013 - None Entered    [facility] [id number]         Subscriber Name Subscriber Birth Date Member ID       ERICK LUONG 1955 " [id number]                     Emergency Contacts        (Rel.) Home Phone Work Phone Mobile Phone    Joan Luong (Spouse) [phone]              Current Facility-Administered Medications   Medication Dose Route Frequency Provider Last Rate Last Admin    acetaminophen (TYLENOL) tablet 650 mg  650 mg Oral Q4H PRN Raymond Mederos MD   650 mg at 04/04/24 0002    Or    acetaminophen (TYLENOL) 160 MG/5ML oral solution 650 mg  650 mg Oral Q4H PRN Raymond Mederos MD   650 mg at 03/27/24 2109    Or    acetaminophen (TYLENOL) suppository 650 mg  650 mg Rectal Q4H PRN Raymond Mederos MD        ascorbic acid (VITAMIN C) tablet 1,000 mg  1,000 mg Oral Daily Raymond Mederos MD   1,000 mg at 04/10/24 0828    sennosides-docusate (PERICOLACE) 8.6-50 MG per tablet 1 tablet  1 tablet Oral BID Raymond Mederos MD   1 tablet at 04/09/24 2107    And    polyethylene glycol (MIRALAX) packet 17 g  17 g Oral Daily Raymond Mederos MD   17 g at 04/06/24 0932    And    bisacodyl (DULCOLAX) EC tablet 5 mg  5 mg Oral Daily PRN Raymond Mederos MD        And    bisacodyl (DULCOLAX) suppository 10 mg  10 mg Rectal Daily PRN Raymond Mederos MD        bumetanide (BUMEX) tablet 2 mg  2 mg Oral Daily Vinny Hartley MD   2 mg at 04/10/24 0827    carvedilol (COREG) tablet 3.125 mg  3.125 mg Oral BID With Meals Rene Maloney MD   3.125 mg at 04/10/24 0828    dextrose (D50W) (25 g/50 mL) IV injection 25 g  25 g Intravenous Q15 Min PRN Raymond Mederos MD        dextrose (GLUTOSE) oral gel 15 g  15 g Oral Q15 Min PRN Raymond Mederos MD   15 g at 03/27/24 1710    Diclofenac Sodium (VOLTAREN) 1 % gel 2 g  2 g Topical 4x Daily PRN Raymond Mederos MD        dicyclomine (BENTYL) capsule 20 mg  20 mg Oral TID PRN Raymond Mederos MD   20 mg at 04/07/24 2050    donepezil (ARICEPT) tablet 10 mg  10 mg Oral Daily Raymond Mederos MD   10 mg at 04/10/24 0827    [Transfer Hold] glucagon (GLUCAGEN) injection 1  mg  1 mg Intramuscular Q15 Min PRN Raquel Duncan, APRN        hydrocortisone (ANUSOL-HC) suppository 25 mg  25 mg Rectal BID Raymond Mederos MD   25 mg at 04/09/24 0828    insulin detemir (LEVEMIR) injection 30 Units  30 Units Subcutaneous Nightly Raymond Mederos MD   15 Units at 04/09/24 2117    Insulin Lispro (humaLOG) injection 4-24 Units  4-24 Units Subcutaneous 4x Daily AC & at Bedtime Raymond Mederos MD   4 Units at 04/09/24 1732    insulin regular (humuLIN R,novoLIN R) injection 10 Units  10 Units Subcutaneous TID AC Raymond Mederos MD   10 Units at 04/10/24 0756    isosorbide mononitrate (IMDUR) 24 hr tablet 30 mg  30 mg Oral Q24H Raymond Mederos MD   30 mg at 04/10/24 0829    lactulose solution 20 g  20 g Oral TID Raymond Mederos MD   20 g at 04/09/24 2107    linezolid (ZYVOX) tablet 600 mg  600 mg Oral Q12H Raymond Mederos MD   600 mg at 04/10/24 0828    magnesium hydroxide (MILK OF MAGNESIA) suspension 10 mL  10 mL Oral Daily PRN Raymond Mederos MD   10 mL at 04/03/24 0414    melatonin tablet 6 mg  6 mg Oral Nightly Raymond Mederos MD   6 mg at 04/09/24 2107    midodrine (PROAMATINE) tablet 2.5 mg  2.5 mg Oral TID AC Raymond Mederos MD   2.5 mg at 04/10/24 0828    mupirocin (BACTROBAN) 2 % nasal ointment 1 Application  1 Application Each Nare BID Raymond Mederos MD   1 Application at 04/10/24 0827    nitroglycerin (NITROSTAT) SL tablet 0.4 mg  0.4 mg Sublingual Q5 Min PRN Raymond Mederos MD        ondansetron ODT (ZOFRAN-ODT) disintegrating tablet 4 mg  4 mg Oral Q6H PRN Raymond Mederos MD   4 mg at 04/10/24 0834    Or    ondansetron (ZOFRAN) injection 4 mg  4 mg Intravenous Q6H PRN Raymond Mederos MD   4 mg at 03/29/24 1837    oxyCODONE (ROXICODONE) immediate release tablet 10 mg  10 mg Oral BID PRN Raymond Mederos MD   10 mg at 04/09/24 2205    pantoprazole (PROTONIX) EC tablet 40 mg  40 mg Oral BID AC Rene Maloney MD   40 mg at 04/10/24 0827    pregabalin (LYRICA) capsule 100 mg  100 mg Oral  Nightly Rene Maloney MD   100 mg at 04/09/24 2106    pregabalin (LYRICA) capsule 50 mg  50 mg Oral Daily Rene Maloney MD   50 mg at 04/10/24 0828    rosuvastatin (CRESTOR) tablet 10 mg  10 mg Oral Nightly Raymond Mederos MD   10 mg at 04/09/24 2107    sodium chloride 0.9 % flush 10 mL  10 mL Intravenous PRN Raymond Mederos MD        sodium chloride 0.9 % flush 10 mL  10 mL Intravenous Q12H Raymond Mederos MD   10 mL at 04/07/24 2050    sodium chloride 0.9 % flush 10 mL  10 mL Intravenous PRN Raymond Mederos MD        sodium chloride 0.9 % flush 10 mL  10 mL Intravenous Q12H Tj Hardwick, YAIR        sodium chloride 0.9 % flush 10 mL  10 mL Intravenous PRN Tj Hardwick, CRNA        sodium chloride 0.9 % infusion 40 mL  40 mL Intravenous PRN Raymond Mederos MD        sodium chloride 0.9 % infusion 40 mL  40 mL Intravenous PRN Tj Hardwick, CRNA        sodium chloride 0.9 % infusion  100 mL/hr Intravenous Continuous Tj Hardwick CRNA        sucralfate (CARAFATE) 1 GM/10ML suspension 1 g  1 g Oral TID AC Raymond Mederos MD   1 g at 04/10/24 0827    tamsulosin (FLOMAX) 24 hr capsule 0.4 mg  0.4 mg Oral Daily Raymond Mederos MD   0.4 mg at 04/10/24 0828    timolol (TIMOPTIC) 0.25 % ophthalmic solution 1 drop  1 drop Both Eyes Q12H Raymond Mederos MD   1 drop at 04/10/24 0829    zinc sulfate (ZINCATE) capsule 220 mg  220 mg Oral Daily Raymond Mederos MD   220 mg at 04/10/24 0827        Physician Progress Notes (last 24 hours)        Rene Maloney MD at 04/10/24 1220              [facility] Medicine Services  INPATIENT PROGRESS NOTE    Patient Name: Erick Luong  Date of Admission: 3/26/2024  Today's Date: 04/10/24  Length of Stay: 15  Primary Care Physician: Del Shetty MD    Subjective   Chief Complaint: Altered mental status    HPI:  4/10  Patient's hemoglobin has drifted down to 2 8.2 G/DL today.  Continues to have epigastric pain and nausea today.   However no vomiting and he was able to tolerate breakfast.    Review of Systems   All pertinent negatives and positives are as above. All other systems have been reviewed and are negative unless otherwise stated.     Objective    Temp:  [97.3 °F (36.3 °C)-98.2 °F (36.8 °C)] 97.6 °F (36.4 °C)  Heart Rate:  [59-78] 75  Resp:  [16-18] 16  BP: (109-133)/(42-85) 113/47  Physical Exam  Constitutional:       General: He is not in acute distress.     Appearance: He is well-developed. He is not diaphoretic.   HENT:      Head: Normocephalic.   Eyes:      General: No scleral icterus.     Conjunctiva/sclera: Conjunctivae normal.      Pupils: Pupils are equal, round, and reactive to light.   Neck:      Thyroid: No thyromegaly.      Vascular: No JVD.      Trachea: No tracheal deviation.   Cardiovascular:      Rate and Rhythm: Normal rate and regular rhythm.      Heart sounds: Normal heart sounds. No murmur heard.     No friction rub. No gallop.   Pulmonary:      Effort: Pulmonary effort is normal. No respiratory distress.      Breath sounds: Normal breath sounds. No stridor. No wheezing or rales.   Chest:      Chest wall: No tenderness.   Abdominal:      General: Bowel sounds are normal. There is no distension.      Palpations: Abdomen is soft. There is no mass.      Tenderness: There is abdominal tenderness. There is no guarding or rebound.      Hernia: No hernia is present.   Musculoskeletal:         General: Swelling and signs of injury present. No tenderness or deformity. Normal range of motion.      Cervical back: Normal range of motion and neck supple.      Right lower leg: No edema.      Comments: Dressing over left foot noted.   Lymphadenopathy:      Cervical: No cervical adenopathy.   Skin:     General: Skin is warm and dry.      Coloration: Skin is not pale.      Findings: No erythema or rash.   Neurological:      General: No focal deficit present.      Mental Status: He is alert and oriented to person, place, and time.       Cranial Nerves: No cranial nerve deficit.      Sensory: No sensory deficit.      Motor: No abnormal muscle tone.      Coordination: Coordination normal.   Psychiatric:         Mood and Affect: Mood normal.         Behavior: Behavior normal.       Results Review:  I have reviewed the labs, radiology results, and diagnostic studies.    Laboratory Data:   Results from last 7 days   Lab Units 04/10/24  0552 04/10/24  0004 04/09/24  1747 04/09/24  1237 04/09/24  0611 04/08/24  1218 04/08/24  0623   WBC 10*3/mm3 4.60  --   --   --  4.35  --  6.19   HEMOGLOBIN g/dL 8.2* 8.0* 8.7*   < > 9.2*   < > 8.7*   HEMATOCRIT % 26.3* 25.6* 26.5*   < > 29.0*   < > 27.0*   PLATELETS 10*3/mm3 173  --   --   --  231  --  246    < > = values in this interval not displayed.        Results from last 7 days   Lab Units 04/10/24  0553 04/09/24  0610 04/08/24  0623 04/06/24  0340 04/05/24  0512   SODIUM mmol/L 141 141 141   < > 139  139   POTASSIUM mmol/L 4.1 4.1 4.0   < > 4.4  4.4   CHLORIDE mmol/L 105 104 104   < > 102  102   CO2 mmol/L 26.0 26.0 25.0   < > 25.0  25.0   BUN mg/dL 27* 28* 30*   < > 31*  31*   CREATININE mg/dL 1.85* 1.84* 1.61*   < > 1.98*  1.98*   CALCIUM mg/dL 9.2 9.4 9.1   < > 11.1*  11.1*   BILIRUBIN mg/dL  --   --  0.4  --  0.4   ALK PHOS U/L  --   --  107  --  138*   ALT (SGPT) U/L  --   --  14  --  18   AST (SGOT) U/L  --   --  16  --  22   GLUCOSE mg/dL 124* 170* 82   < > 173*  173*    < > = values in this interval not displayed.       Culture Data:   Blood Culture   Date Value Ref Range Status   03/26/2024 Abnormal Stain (C)  Preliminary   03/26/2024 Abnormal Stain (C)  Preliminary       Radiology Data:   Imaging Results (Last 24 Hours)       ** No results found for the last 24 hours. **            I have reviewed the patient's current medications.     Assessment/Plan   Assessment  Active Hospital Problems    Diagnosis     **Sepsis     Diabetic foot infection     Chronic kidney disease (CKD), stage IV  (severe)     Chronic diastolic heart failure     BPH without obstruction/lower urinary tract symptoms     Diabetic polyneuropathy associated with type 2 diabetes mellitus     Anemia due to chronic kidney disease     Essential hypertension     Type 2 diabetes mellitus with hyperglycemia, with long-term current use of insulin    MRSA bacteremia  Metabolic encephalopathy secondary to sepsis  WANDER on CKD    Treatment Plan  Continue p.o. Zyvox.  Infectious disease input appreciated.      Nephrology input appreciated.  Creatinine appears to be at baseline.  Continue p.o. Bumex 2 mg daily.    Patient continues to have epigastric and periumbilical pain and nausea.  No overt signs of bleeding, however hemoglobin is drifted down to 8.2G/DL today.  The patient does have a history of esophagitis in the past on previous upper GI endoscopies.  He is currently on sucralfate as well as p.o. Protonix 40 mg every 12 hours.  I discussed with gastroenterologist and we will plan an EGD tomorrow morning.  N.p.o. for midnight.    Of note, patient had colonoscopy which was limited due to poor prep.  However, a bleeding area in the sigmoid colon was clipped. Will discontinue aspirin and Eliquis. Monitor hemoglobin closely every 6 hours.  Transfuse if hemoglobin less than 7G/DL    Podiatry consultation input appreciated.  Will follow recommendations.  Continue wound dressings as per podiatry.    Urology input appreciated.  No signs of urinary retention at this time.  Continue to monitor.    Pain control with opiate pain medications.    Insulin sliding scale and Levemir for type 2 diabetes mellitus    PT/OT/wound care consultations    DVT prophylaxis with SCDs    CODE STATUS is full code    Medical Decision Making  Number and Complexity of problems: 4 acute, severe, high complexity medical problems.  Multiple chronic medical problems.  Differential Diagnosis: None    Conditions and Status        Condition is worsening.     [facility] Data  External  documents reviewed: None  Cardiac tracing (EKG, telemetry) interpretation: None  Radiology interpretation: None  Labs reviewed: CBC, BMP, blood cultures reviewed by me  Any tests that were considered but not ordered: None     Decision rules/scores evaluated (example [id number], Wells, etc): N/A     Discussed with: Patient     Care Planning  Shared decision making: Patient and his wife  Code status and discussions: CODE STATUS is full code    Disposition  Social Determinants of Health that impact treatment or disposition: None  I expect the patient to be discharged to home in the next 1 to 2 days    Electronically signed by Rene Maloney MD, 04/10/24, 12:21 CDT.     Electronically signed by Rene Maloney MD at 04/10/24 1225       Stewart Alvarez, APRN at 04/10/24 1114       Attestation signed by Vinny Hartley MD at 04/10/24 1150    I have reviewed this documentation and agree.                  Nephrology ([facility]) Progress Note      Patient:  Erick Luong  YOB: 1955  Date of Service: 4/10/2024  MRN: [id number]   Acct: [id number]   Primary Care Physician: Del Shetty MD  Advance Directive:   Code Status and Medical Interventions:   Ordered at: 03/26/24 1815     Level Of Support Discussed With:    Health Care Surrogate     Code Status (Patient has no pulse and is not breathing):    CPR (Attempt to Resuscitate)     Medical Interventions (Patient has pulse or is breathing):    Full Support     Admit Date: 3/26/2024       Hospital Day: 15  Referring Provider: No ref. provider found      Patient personally seen and examined.  Complete chart including Consults, Notes, Operative Reports, Labs, Cardiology, and Radiology studies reviewed as able.        Subjective:  Erick Luong is a 68 y.o. male for whom we were consulted for evaluation and treatment of acute kidney injury.  He has stage IIIb chronic kidney disease baseline, follows with Dr Lomas in  our office.  Baseline creatinine approximately 1.8. He has history of insulin-dependent type 2 diabetes, hypertension, abdominal obesity, obstructive sleep apnea, diabetic foot ulcer and coronary artery disease.   Patient presented to ER on 3/26 with altered mental status. Had debridement of ongoing wound on left foot the day prior. Left foot warm and erythremic on arrival to ER. Noted to have elevated blood glucose over 400. Initial creatinine of 1.9.  Admitted to medical floor for sepsis due to left foot wound. Patient has been agitated and confused during the admission, requiring wrist restraints due to pulling at lines and tubes. Renal function and mentation have been improving. Now has a sitter present in room due to repeatedly getting up unattended. He is s/p colonoscopy on 4/07 due to rectal bleding.     Today is awake and alert. No new complaints or overnight issues. Urine output nonoliguric     Allergies:  Cefepime, Bactrim [sulfamethoxazole-trimethoprim], Vancomycin, Zolpidem, Zolpidem tartrate, and Metronidazole    Home Meds:  Medications Prior to Admission   Medication Sig Dispense Refill Last Dose    amitriptyline (ELAVIL) 25 MG tablet Take 2 tablets by mouth Daily at bedtime. (Patient not taking: Reported on 3/27/2024) 60 tablet 1 Not Taking    apixaban (ELIQUIS) 5 MG tablet tablet Take 1 tablet by mouth Every 12 (Twelve) Hours.       ascorbic acid (VITAMIN C) 1000 MG tablet Take 1 tablet by mouth Daily. 30 tablet 3     Aspirin 81 MG capsule Take 81 mg by mouth Daily.       bumetanide (BUMEX) 1 MG tablet Take 1 tablet every day by oral route for 30 days. 30 tablet 0     bumetanide (BUMEX) 2 MG tablet Take 1 tablet by mouth Daily. Take 2 tablets in morning;1 tablet at night (Patient not taking: Reported on 3/27/2024)   Not Taking    busPIRone (BUSPAR) 10 MG tablet Take 1 tablet by mouth 3 (Three) Times a Day.       calcitriol (ROCALTROL) 0.5 MCG capsule Take 1 capsule by mouth Daily. 90 capsule 4      calcitriol (ROCALTROL) 0.5 MCG capsule Take 1 capsule every day by oral route for 90 days. (Patient not taking: Reported on 3/27/2024) 90 capsule 4 Not Taking    carvedilol (COREG) 3.125 MG tablet Take 1 tablet twice a day by oral route. 60 tablet 4     carvedilol (COREG) 6.25 MG tablet Take 1 tablet by mouth 2 (Two) Times a Day. (Patient not taking: Reported on 3/27/2024) 180 tablet 4 Not Taking    Cholecalciferol (D-5000) 125 MCG (5000 UT) tablet Take 1 tablet by mouth Daily Before Lunch. 30 tablet 2     cloNIDine (CATAPRES) 0.1 MG tablet Take 1 tablet by mouth Every 12 (Twelve) Hours. (Patient not taking: Reported on 3/27/2024) 60 tablet 2 Not Taking    Diclofenac Sodium (VOLTAREN) 1 % gel gel Apply 2 g topically to the appropriate area as directed 4 (Four) Times a Day As Needed. 300 g 11     Diclofenac Sodium (VOLTAREN) 1 % gel gel Apply 2 g topically to the appropriate area as directed 4 (Four) Times a Day. (Patient not taking: Reported on 3/27/2024) 300 g 11 Not Taking    donepezil (ARICEPT) 10 MG tablet Take 1 tablet by mouth Daily. (Patient not taking: Reported on 3/27/2024) 90 tablet 4 Not Taking    Dulaglutide (Trulicity) 4.5 MG/0.5ML solution pen-injector Inject 0.5 mL under the skin into the appropriate area as directed 1 (One) Time Per Week. (Patient not taking: Reported on 3/27/2024) 2 mL 11 Not Taking    DULoxetine (CYMBALTA) 60 MG capsule Take 1 capsule by mouth Daily. 90 capsule 4     DULoxetine (CYMBALTA) 60 MG capsule Take 1 capsule by mouth Daily. (Patient not taking: Reported on 3/27/2024) 90 capsule 4 Not Taking    DULoxetine (CYMBALTA) 60 MG capsule Take 1 capsule by mouth Daily. (Patient not taking: Reported on 3/27/2024) 90 capsule 4 Not Taking    empagliflozin (JARDIANCE) 25 MG tablet tablet Take 1 tablet by mouth Daily. (Patient not taking: Reported on 3/27/2024) 30 tablet 2 Not Taking    famotidine (PEPCID) 20 MG tablet Take 1 tablet twice a day by oral route. 180 tablet 4     fluticasone  (FLONASE) 50 MCG/ACT nasal spray 1 spray into the nostril(s) as directed by provider Daily. (Patient taking differently: 1 spray into the nostril(s) as directed by provider 2 (Two) Times a Day.) 16 g 1     HYDROcodone-acetaminophen (NORCO) 7.5-325 MG per tablet Take 1 tablet by mouth 2 (Two) Times a Day As Needed. (Patient not taking: Reported on 3/27/2024) 40 tablet 0 Not Taking    Insulin Glargine (Lantus SoloStar) 100 UNIT/ML injection pen Inject 20 Units under the skin into the appropriate area as directed Every Evening. 15 mL 3     Insulin Regular Human, Conc, (HumuLIN R U-500 KwikPen) 500 UNIT/ML solution pen-injector CONCENTRATED injection Inject 120 Units under the skin into the appropriate area as directed 2 (Two) Times a Day with breakfast and dinner. (Patient not taking: Reported on 3/27/2024) 18 mL 5 Not Taking    Insulin Regular Human, Conc, (HumuLIN R U-500 KwikPen) 500 UNIT/ML solution pen-injector CONCENTRATED injection Inject 40 Units under the skin into the appropriate area with regular meals AND 40 Units with large meals. 54 mL 3     isosorbide mononitrate (IMDUR) 30 MG 24 hr tablet Take 1 tablet by mouth Daily.       magnesium hydroxide (Milk of Magnesia) 400 MG/5ML suspension Take 30 mL every day by oral route for 30 days. 900 mL 0     magnesium hydroxide (Milk of Magnesia) 400 MG/5ML suspension Take 30mL by mouth once daily for 30 days. (Patient not taking: Reported on 3/27/2024) 900 mL 0 Not Taking    melatonin 3 MG tablet Take 1 tablet by mouth At Night As Needed for Sleep.       methylcellulose (Citrucel) oral powder Mix 5g as directed and drink twice daily for 90 days. 900 g 10     metoclopramide (REGLAN) 5 MG tablet Take 1 tablet by mouth 3 times a day.       midodrine (PROAMATINE) 2.5 MG tablet Take 1 tablet by mouth 3 (Three) Times a Day Before Meals.       nitroglycerin (NITROSTAT) 0.4 MG SL tablet Dissolve 1 tablet by mouth every 5 minutes as needed for chest pain; MAX 3 tabs/24 hours.   If no improvement after 3 tabs go to ER 25 tablet 0     ondansetron (ZOFRAN) 4 MG tablet Take 1 tablet by mouth Every 6 (Six) Hours As Needed for Nausea or Vomiting.       oxyCODONE (ROXICODONE) 10 MG tablet Take 1 tablet by mouth twice a day as needed 60 tablet 0     pantoprazole (Protonix) 40 MG EC tablet Take 1 tablet by mouth 2 (Two) Times a Day. (Patient not taking: Reported on 3/27/2024) 180 tablet 4 Not Taking    polyethylene glycol (MIRALAX) 17 g packet Take 17 g by mouth Daily. Obtain OTC       prazosin (MINIPRESS) 1 MG capsule Take 1 capsule by mouth every night at bedtime. 30 capsule 2     pregabalin (LYRICA) 100 MG capsule Take 2 capsules by mouth Every Night.       pregabalin (LYRICA) 50 MG capsule Take 1 capsule by mouth Daily.       rosuvastatin (CRESTOR) 10 MG tablet Take 1 tablet by mouth Daily.       sennosides-docusate (PERICOLACE) 8.6-50 MG per tablet Take 1 tablet by mouth Every Night. Obtain OTC       sennosides-docusate (PERICOLACE) 8.6-50 MG per tablet Take 1 tablet by mouth every night at bedtime. (Patient not taking: Reported on 3/27/2024) 30 tablet 2 Not Taking    sodium hypochlorite (DAKIN'S 1/4 STRENGTH) 0.125 % solution topical solution 0.125% Apply to affected area twice daily (Patient not taking: Reported on 3/27/2024) 473 mL 3 Not Taking    sodium hypochlorite (DAKIN'S 1/4 STRENGTH) 0.125 % solution topical solution 0.125% Apply to affected area twice daily as directed (Patient not taking: Reported on 3/27/2024) 473 mL 5 Not Taking    sodium hypochlorite (DAKIN'S) 0.25 % topical solution Use to wound daily as instructed 473 mL 1     sucralfate (CARAFATE) 1 g tablet Take 1 tablet by mouth 3 times a day.       tamsulosin (FLOMAX) 0.4 MG capsule 24 hr capsule Take 1 capsule by mouth Daily. 90 capsule 1     timolol (TIMOPTIC) 0.5 % ophthalmic solution Administer 1 drop to both eyes 2 (Two) Times a Day.       valsartan (DIOVAN) 40 MG tablet Take 1 tablet by mouth Daily.       zinc  sulfate (ZINCATE) 50 MG capsule Take 1 capsule by mouth Daily.          Medicines:  Current Facility-Administered Medications   Medication Dose Route Frequency Provider Last Rate Last Admin    acetaminophen (TYLENOL) tablet 650 mg  650 mg Oral Q4H PRN Raymond Mederos MD   650 mg at 04/04/24 0002    Or    acetaminophen (TYLENOL) 160 MG/5ML oral solution 650 mg  650 mg Oral Q4H PRN Raymond Mederos MD   650 mg at 03/27/24 2109    Or    acetaminophen (TYLENOL) suppository 650 mg  650 mg Rectal Q4H PRN Raymond Mederos MD        ascorbic acid (VITAMIN C) tablet 1,000 mg  1,000 mg Oral Daily Raymond Mederos MD   1,000 mg at 04/10/24 0828    sennosides-docusate (PERICOLACE) 8.6-50 MG per tablet 1 tablet  1 tablet Oral BID Raymond Mederos MD   1 tablet at 04/09/24 2107    And    polyethylene glycol (MIRALAX) packet 17 g  17 g Oral Daily Raymond Mederos MD   17 g at 04/06/24 0932    And    bisacodyl (DULCOLAX) EC tablet 5 mg  5 mg Oral Daily PRN Raymond Mederos MD        And    bisacodyl (DULCOLAX) suppository 10 mg  10 mg Rectal Daily PRN Raymond Mederos MD        bumetanide (BUMEX) tablet 2 mg  2 mg Oral Daily Vinny Hartley MD   2 mg at 04/10/24 0827    carvedilol (COREG) tablet 3.125 mg  3.125 mg Oral BID With Meals Rene Maloney MD   3.125 mg at 04/10/24 0828    dextrose (D50W) (25 g/50 mL) IV injection 25 g  25 g Intravenous Q15 Min PRN Raymond Mederos MD        dextrose (GLUTOSE) oral gel 15 g  15 g Oral Q15 Min PRN Raymond Mederos MD   15 g at 03/27/24 1710    Diclofenac Sodium (VOLTAREN) 1 % gel 2 g  2 g Topical 4x Daily PRN Raymond Mederos MD        dicyclomine (BENTYL) capsule 20 mg  20 mg Oral TID PRN Raymond Mederos MD   20 mg at 04/07/24 2050    donepezil (ARICEPT) tablet 10 mg  10 mg Oral Daily Raymond Mederos MD   10 mg at 04/10/24 0827    [Transfer Hold] glucagon (GLUCAGEN) injection 1 mg  1 mg Intramuscular Q15 Min PRN Raquel Duncan, APRN        hydrocortisone (ANUSOL-HC) suppository 25  mg  25 mg Rectal BID Raymond Mederos MD   25 mg at 04/09/24 0828    insulin detemir (LEVEMIR) injection 30 Units  30 Units Subcutaneous Nightly Raymond Mederos MD   15 Units at 04/09/24 2117    Insulin Lispro (humaLOG) injection 4-24 Units  4-24 Units Subcutaneous 4x Daily AC & at Bedtime Raymond Mederos MD   4 Units at 04/09/24 1732    insulin regular (humuLIN R,novoLIN R) injection 10 Units  10 Units Subcutaneous TID AC Raymond Mederos MD   10 Units at 04/10/24 0756    isosorbide mononitrate (IMDUR) 24 hr tablet 30 mg  30 mg Oral Q24H Raymond Mederos MD   30 mg at 04/10/24 0829    lactulose solution 20 g  20 g Oral TID Raymond Mederos MD   20 g at 04/09/24 2107    linezolid (ZYVOX) tablet 600 mg  600 mg Oral Q12H Raymond Mederos MD   600 mg at 04/10/24 0828    magnesium hydroxide (MILK OF MAGNESIA) suspension 10 mL  10 mL Oral Daily PRN Raymond Mederos MD   10 mL at 04/03/24 0414    melatonin tablet 6 mg  6 mg Oral Nightly Raymond Mederos MD   6 mg at 04/09/24 2107    midodrine (PROAMATINE) tablet 2.5 mg  2.5 mg Oral TID AC Raymond Mederos MD   2.5 mg at 04/10/24 0828    mupirocin (BACTROBAN) 2 % nasal ointment 1 Application  1 Application Each Nare BID Raymond Mederos MD   1 Application at 04/10/24 0827    nitroglycerin (NITROSTAT) SL tablet 0.4 mg  0.4 mg Sublingual Q5 Min PRN Raymond Mederos MD        ondansetron ODT (ZOFRAN-ODT) disintegrating tablet 4 mg  4 mg Oral Q6H PRN Raymond Mederos MD   4 mg at 04/10/24 0834    Or    ondansetron (ZOFRAN) injection 4 mg  4 mg Intravenous Q6H PRN Raymond Mederos MD   4 mg at 03/29/24 1837    oxyCODONE (ROXICODONE) immediate release tablet 10 mg  10 mg Oral BID PRN Raymond Mederos MD   10 mg at 04/09/24 2205    pantoprazole (PROTONIX) EC tablet 40 mg  40 mg Oral BID AC Rene Maloney MD   40 mg at 04/10/24 0827    pregabalin (LYRICA) capsule 100 mg  100 mg Oral Nightly Rene Maloney MD   100 mg at 04/09/24 2106    pregabalin (LYRICA) capsule 50 mg  50 mg Oral  Daily Rene Maloney MD   50 mg at 04/10/24 0828    rosuvastatin (CRESTOR) tablet 10 mg  10 mg Oral Nightly Raymond Mederos MD   10 mg at 04/09/24 2107    sodium chloride 0.9 % flush 10 mL  10 mL Intravenous PRN Raymond Mederos MD        sodium chloride 0.9 % flush 10 mL  10 mL Intravenous Q12H Raymond Mederos MD   10 mL at 04/07/24 2050    sodium chloride 0.9 % flush 10 mL  10 mL Intravenous PRN Raymond Mederos MD        sodium chloride 0.9 % flush 10 mL  10 mL Intravenous Q12H Tj Hardwick CRNA        sodium chloride 0.9 % flush 10 mL  10 mL Intravenous PRN Tj Hardwick CRNA        sodium chloride 0.9 % infusion 40 mL  40 mL Intravenous PRN Raymond Mederos MD        sodium chloride 0.9 % infusion 40 mL  40 mL Intravenous PRN Tj Hardwick CRNA        sodium chloride 0.9 % infusion  100 mL/hr Intravenous Continuous Tj Hardwick CRNA        sucralfate (CARAFATE) 1 GM/10ML suspension 1 g  1 g Oral TID AC Raymond Mederos MD   1 g at 04/10/24 0827    tamsulosin (FLOMAX) 24 hr capsule 0.4 mg  0.4 mg Oral Daily Raymond Mederos MD   0.4 mg at 04/10/24 0828    timolol (TIMOPTIC) 0.25 % ophthalmic solution 1 drop  1 drop Both Eyes Q12H Raymond Mederos MD   1 drop at 04/10/24 0829    zinc sulfate (ZINCATE) capsule 220 mg  220 mg Oral Daily Raymond Mederos MD   220 mg at 04/10/24 0827       Past Medical History:  Past Medical History:   Diagnosis Date    Arthritis     Autonomic disease     CAD (coronary artery disease) 02/06/2017    Cervical radiculopathy 09/16/2021    Chronic constipation with acute exaccerbation 05/10/2021    Coronary artery disease     Degeneration of cervical intervertebral disc 08/11/2021    Diabetes mellitus     Diabetic foot ulcer 08/31/2020    Diabetic polyneuropathy associated with type 2 diabetes mellitus 01/18/2021    Elevated cholesterol     Gastroesophageal reflux disease 05/13/2019    Headache     HTN (hypertension), benign 05/03/2017    Hyperlipidemia     Hypertension     Mixed  hyperlipidemia 02/07/2017    Multiple lung nodules 01/26/2020    5mm, 9 mm RLL identified 1/2020, not present 10/2019.    Myocardial infarction     Osteomyelitis 01/22/2020    Osteomyelitis of fifth toe of right foot 10/07/2019    Pancreatitis     Persistent insomnia 01/20/2020    Renal disorder     Sleep apnea 02/06/2017    Sleep apnea with use of continuous positive airway pressure (CPAP)     NON-COMPLIANT    Slow transit constipation 01/16/2019    Spinal stenosis in cervical region 09/16/2021    Vitamin D deficiency 03/02/2021       Past Surgical History:  Past Surgical History:   Procedure Laterality Date    ABDOMINAL SURGERY      AMPUTATION FOOT / TOE Left 10/2021    5th digit     ANTERIOR CERVICAL DISCECTOMY W/ FUSION N/A 8/5/2022    Procedure: CERVICAL DISCECTOMY ANTERIOR WITH FUSION C5-6 with possible plating of C5-7 with neuromonitoring and 1 c-arm;  Surgeon: Karel Soliz MD;  Location:  [facility] OR;  Service: Neurosurgery;  Laterality: N/A;    APPENDECTOMY      BACK SURGERY      CARDIAC CATHETERIZATION Left 02/08/2021    Procedure: Left Heart Cath w poss intervention left anatomical snuff box acess;  Surgeon: Omkar Charles DO;  Location:  [facility] CATH INVASIVE LOCATION;  Service: Cardiology;  Laterality: Left;    CARDIAC SURGERY      CATARACT EXTRACTION      CERVICAL SPINE SURGERY      COLONOSCOPY N/A 01/31/2017    Normal exam repeat in 5 years    COLONOSCOPY N/A 02/11/2019    Mild acute inflammation    COLONOSCOPY N/A 4/7/2024    Procedure: COLONOSCOPY WITH ANESTHESIA;  Surgeon: Raymond Mederos MD;  Location:  [facility] OR;  Service: Gastroenterology;  Laterality: N/A;  Pre: Anemia, Rectal bleed;  Post: Visible vessel at 20cm, clip x6 placed;  Del Shetty MD    COLONOSCOPY W/ POLYPECTOMY  03/04/2014    Hyperplastic polyp    CORONARY ARTERY BYPASS GRAFT  10/2015    ENDOSCOPY  04/13/2011    Gastritis with hemorrhage    ENDOSCOPY N/A 05/05/2017    Normal exam    ENDOSCOPY N/A 02/11/2019     Gastritis    ENDOSCOPY N/A 2020    Non-erosive gastritis with hemorrhage    ENDOSCOPY N/A 02/10/2021    Esophagitis    FOOT SURGERY Left     INCISION AND DRAINAGE OF WOUND Left 2007    spider bite       Family History  Family History   Problem Relation Age of Onset    Colon cancer Father     Heart disease Father     Colon cancer Sister     Colon polyps Sister     Alzheimer's disease Mother     Coronary artery disease Sister     Coronary artery disease Sister        Social History  Social History     Socioeconomic History    Marital status:    Tobacco Use    Smoking status: Former     Current packs/day: 0.00     Types: Cigarettes     Quit date:      Years since quittin.2    Smokeless tobacco: Never    Tobacco comments:     smoked in [employer]   Vaping Use    Vaping status: Never Used   Substance and Sexual Activity    Alcohol use: No    Drug use: No    Sexual activity: Defer       Review of Systems:  History obtained from chart review and the patient  General ROS: No fever or chills  Respiratory ROS: No cough, shortness of breath, wheezing  Cardiovascular ROS: No chest pain or palpitations  Gastrointestinal ROS: No abdominal pain or melena  Genito-Urinary ROS: No dysuria or hematuria  Psych ROS: No anxiety and depression  14 point ROS reviewed with the patient and negative except as noted above and in the HPI unless unable to obtain.    Objective:  Patient Vitals for the past 24 hrs:   BP Temp Temp src Pulse Resp SpO2   04/10/24 0805 109/42 97.6 °F (36.4 °C) -- 59 16 98 %   04/10/24 0418 110/50 97.6 °F (36.4 °C) Oral 61 16 98 %   04/10/24 0026 121/53 98.2 °F (36.8 °C) Oral 67 18 98 %   24 133/70 97.4 °F (36.3 °C) Oral 65 18 99 %   24 1549 115/46 97.4 °F (36.3 °C) Oral 69 18 100 %   24 1305 120/85 97.3 °F (36.3 °C) Oral 78 16 95 %       Intake/Output Summary (Last 24 hours) at 4/10/2024 1114  Last data filed at 4/10/2024 0926  Gross per 24 hour   Intake 240 ml   Output  1000 ml   Net -760 ml     General: awake/alert   Chest:  clear to auscultation bilaterally without respiratory distress  CVS: regular rate and rhythm  Abdominal: soft, nontender, positive bowel sounds  Extremities:  bilateral 1+ edema  Skin: warm and dry without rash      Labs:  Results from last 7 days   Lab Units 04/10/24  0552 04/10/24  0004 04/09/24  1747 04/09/24  1237 04/09/24  0611 04/08/24  1218 04/08/24  0623   WBC 10*3/mm3 4.60  --   --   --  4.35  --  6.19   HEMOGLOBIN g/dL 8.2* 8.0* 8.7*   < > 9.2*   < > 8.7*   HEMATOCRIT % 26.3* 25.6* 26.5*   < > 29.0*   < > 27.0*   PLATELETS 10*3/mm3 173  --   --   --  231  --  246    < > = values in this interval not displayed.         Results from last 7 days   Lab Units 04/10/24  0553 04/09/24  0610 04/08/24  0623 04/06/24  0340 04/05/24  0512   SODIUM mmol/L 141 141 141   < > 139  139   POTASSIUM mmol/L 4.1 4.1 4.0   < > 4.4  4.4   CHLORIDE mmol/L 105 104 104   < > 102  102   CO2 mmol/L 26.0 26.0 25.0   < > 25.0  25.0   BUN mg/dL 27* 28* 30*   < > 31*  31*   CREATININE mg/dL 1.85* 1.84* 1.61*   < > 1.98*  1.98*   CALCIUM mg/dL 9.2 9.4 9.1   < > 11.1*  11.1*   EGFR mL/min/1.73 39.4* 39.7* 46.6*   < > 36.3*  36.3*   BILIRUBIN mg/dL  --   --  0.4  --  0.4   ALK PHOS U/L  --   --  107  --  138*   ALT (SGPT) U/L  --   --  14  --  18   AST (SGOT) U/L  --   --  16  --  22   GLUCOSE mg/dL 124* 170* 82   < > 173*  173*    < > = values in this interval not displayed.       Radiology:   Imaging Results (Last 72 Hours)       ** No results found for the last 72 hours. **            Culture:  Blood Culture   Date Value Ref Range Status   03/26/2024 Staphylococcus aureus, MRSA (C)  Final     Comment:       Infectious disease consultation is highly recommended to rule out distant foci of infection.  Methicillin resistant Staphylococcus aureus, Patient may be an isolation risk.   03/26/2024 Staphylococcus aureus, MRSA (C)  Final     Comment:       Infectious disease  consultation is highly recommended to rule out distant foci of infection.  Methicillin resistant Staphylococcus aureus, Patient may be an isolation risk.         Assessment    Acute kidney injury, ATN--resolved  Baseline chronic kidney disease stage 3b  Type 2 diabetes, poorly controlled (A1c 10.8%)  Hypertension   Metabolic encephalopathy--improved  Anemia of CKD  Hypokalemia--improved  Sepsis due to infection of left foot wound  Hypercalcemia--improving    Plan:  Renal function remains stable  Monitor labs  When discharged, will need follow up in our office in 1-2 weeks      Stewart Alvarez, APRSHERRILL  4/10/2024  11:14 CDT      Electronically signed by Vinny Hartley MD at 04/10/24 1150       Rene Maloney MD at 04/09/24 2570              [facility] Medicine Services  INPATIENT PROGRESS NOTE    Patient Name: Erick Luong  Date of Admission: 3/26/2024  Today's Date: 04/09/24  Length of Stay: 14  Primary Care Physician: Del Shetty MD    Subjective   Chief Complaint: Altered mental status    HPI:  4/9  Patient's hemoglobin 8.4G/DL today.  Continues to have epigastric pain.    Review of Systems   All pertinent negatives and positives are as above. All other systems have been reviewed and are negative unless otherwise stated.     Objective    Temp:  [97.3 °F (36.3 °C)-98.2 °F (36.8 °C)] 97.4 °F (36.3 °C)  Heart Rate:  [53-78] 65  Resp:  [16-18] 18  BP: (113-133)/(46-85) 133/70  Physical Exam  Constitutional:       General: He is not in acute distress.     Appearance: He is well-developed. He is not diaphoretic.   HENT:      Head: Normocephalic.   Eyes:      General: No scleral icterus.     Conjunctiva/sclera: Conjunctivae normal.      Pupils: Pupils are equal, round, and reactive to light.   Neck:      Thyroid: No thyromegaly.      Vascular: No JVD.      Trachea: No tracheal deviation.   Cardiovascular:      Rate and Rhythm: Normal rate and regular rhythm.       Heart sounds: Normal heart sounds. No murmur heard.     No friction rub. No gallop.   Pulmonary:      Effort: Pulmonary effort is normal. No respiratory distress.      Breath sounds: Normal breath sounds. No stridor. No wheezing or rales.   Chest:      Chest wall: No tenderness.   Abdominal:      General: Bowel sounds are normal. There is no distension.      Palpations: Abdomen is soft. There is no mass.      Tenderness: There is abdominal tenderness. There is no guarding or rebound.      Hernia: No hernia is present.   Musculoskeletal:         General: Swelling and signs of injury present. No tenderness or deformity. Normal range of motion.      Cervical back: Normal range of motion and neck supple.      Right lower leg: No edema.      Comments: Dressing over left foot noted.   Lymphadenopathy:      Cervical: No cervical adenopathy.   Skin:     General: Skin is warm and dry.      Coloration: Skin is not pale.      Findings: No erythema or rash.   Neurological:      General: No focal deficit present.      Mental Status: He is alert and oriented to person, place, and time.      Cranial Nerves: No cranial nerve deficit.      Sensory: No sensory deficit.      Motor: No abnormal muscle tone.      Coordination: Coordination normal.   Psychiatric:         Mood and Affect: Mood normal.         Behavior: Behavior normal.       Results Review:  I have reviewed the labs, radiology results, and diagnostic studies.    Laboratory Data:   Results from last 7 days   Lab Units 04/09/24  1747 04/09/24  1237 04/09/24  0611 04/08/24  1218 04/08/24  0623 04/07/24  1429 04/07/24  0422   WBC 10*3/mm3  --   --  4.35  --  6.19  --  5.70   HEMOGLOBIN g/dL 8.7* 8.4* 9.2*   < > 8.7*   < > 9.2*   HEMATOCRIT % 26.5* 26.5* 29.0*   < > 27.0*   < > 29.5*   PLATELETS 10*3/mm3  --   --  231  --  246  --  275    < > = values in this interval not displayed.        Results from last 7 days   Lab Units 04/09/24  0610 04/08/24  0623 04/07/24  0422  04/06/24  0340 04/05/24  0512   SODIUM mmol/L 141 141 141   < > 139  139   POTASSIUM mmol/L 4.1 4.0 4.0   < > 4.4  4.4   CHLORIDE mmol/L 104 104 103   < > 102  102   CO2 mmol/L 26.0 25.0 27.0   < > 25.0  25.0   BUN mg/dL 28* 30* 33*   < > 31*  31*   CREATININE mg/dL 1.84* 1.61* 1.75*   < > 1.98*  1.98*   CALCIUM mg/dL 9.4 9.1 10.1   < > 11.1*  11.1*   BILIRUBIN mg/dL  --  0.4  --   --  0.4   ALK PHOS U/L  --  107  --   --  138*   ALT (SGPT) U/L  --  14  --   --  18   AST (SGOT) U/L  --  16  --   --  22   GLUCOSE mg/dL 170* 82 94   < > 173*  173*    < > = values in this interval not displayed.       Culture Data:   Blood Culture   Date Value Ref Range Status   03/26/2024 Abnormal Stain (C)  Preliminary   03/26/2024 Abnormal Stain (C)  Preliminary       Radiology Data:   Imaging Results (Last 24 Hours)       ** No results found for the last 24 hours. **            I have reviewed the patient's current medications.     Assessment/Plan   Assessment  Active Hospital Problems    Diagnosis     **Sepsis     Diabetic foot infection     Chronic kidney disease (CKD), stage IV (severe)     Chronic diastolic heart failure     BPH without obstruction/lower urinary tract symptoms     Diabetic polyneuropathy associated with type 2 diabetes mellitus     Anemia due to chronic kidney disease     Essential hypertension     Type 2 diabetes mellitus with hyperglycemia, with long-term current use of insulin    MRSA bacteremia  Metabolic encephalopathy secondary to sepsis  WANDER on CKD    Treatment Plan  Continue p.o. Zyvox.  Infectious disease input appreciated.      Nephrology input appreciated.  Creatinine appears to be at baseline.    Gastroenterology input appreciated.  Bleeding area in the sigmoid colon was clipped.  Will discontinue aspirin and Eliquis.  Monitor hemoglobin and if greater than 8G/DL, plan for discharge tomorrow and repeat colonoscopy with poor prep as outpatient..  Monitor hemoglobin closely every 6 hours.   Transfuse if hemoglobin less than 7G/DL    Podiatry consultation input appreciated.  Will follow recommendations.  Continue wound dressings as per podiatry.    Nephrology and urology input appreciated.  Restart Bumex.    Pain control with opiate pain medications.    Insulin sliding scale and Levemir for type 2 diabetes mellitus    PT/OT/wound care consultations    DVT prophylaxis with SCDs    CODE STATUS is full code    Medical Decision Making  Number and Complexity of problems: 4 acute, severe, high complexity medical problems.  Multiple chronic medical problems.  Differential Diagnosis: None    Conditions and Status        Condition is worsening.     [facility] Data  External documents reviewed: None  Cardiac tracing (EKG, telemetry) interpretation: None  Radiology interpretation: None  Labs reviewed: CBC, BMP, blood cultures reviewed by me  Any tests that were considered but not ordered: None     Decision rules/scores evaluated (example [id number], Wells, etc): N/A     Discussed with: Patient     Care Planning  Shared decision making: Patient and his wife  Code status and discussions: CODE STATUS is full code    Disposition  Social Determinants of Health that impact treatment or disposition: None  I expect the patient to be discharged to home in the next 24 hours    Electronically signed by Rene Maloney MD, 04/09/24, 22:20 CDT.     Electronically signed by Rene Maloney MD at 04/09/24 7330       Adelaida Vines APRN at 04/09/24 1617       Attestation signed by Asad Cerrato DPM at 04/09/24 1827    I have reviewed this documentation and agree.                      [facility] - PODIATRY    Today's Date: 04/09/24     Patient Name: Erick Luong  MRN: [id number]  CSN: [id number]  PCP: Del Shetty MD  Referring Provider: No ref. provider found  Attending Provider: Rene Maloney MD  Length of Stay: 14    SUBJECTIVE   Chief Complaint: Left foot wounds    HPI: myriam Bush 68  y.o.male, who was admitted on 3/26/2024 and podiatry consult was made for left foot wounds.  Left foot dressing changed today.     Patient is up in bedside chair and alert today. He denies any significant overnight events. Denies pain at this time.       Past Medical History:   Diagnosis Date    Arthritis     Autonomic disease     CAD (coronary artery disease) 02/06/2017    Cervical radiculopathy 09/16/2021    Chronic constipation with acute exaccerbation 05/10/2021    Coronary artery disease     Degeneration of cervical intervertebral disc 08/11/2021    Diabetes mellitus     Diabetic foot ulcer 08/31/2020    Diabetic polyneuropathy associated with type 2 diabetes mellitus 01/18/2021    Elevated cholesterol     Gastroesophageal reflux disease 05/13/2019    Headache     HTN (hypertension), benign 05/03/2017    Hyperlipidemia     Hypertension     Mixed hyperlipidemia 02/07/2017    Multiple lung nodules 01/26/2020    5mm, 9 mm RLL identified 1/2020, not present 10/2019.    Myocardial infarction     Osteomyelitis 01/22/2020    Osteomyelitis of fifth toe of right foot 10/07/2019    Pancreatitis     Persistent insomnia 01/20/2020    Renal disorder     Sleep apnea 02/06/2017    Sleep apnea with use of continuous positive airway pressure (CPAP)     NON-COMPLIANT    Slow transit constipation 01/16/2019    Spinal stenosis in cervical region 09/16/2021    Vitamin D deficiency 03/02/2021     Past Surgical History:   Procedure Laterality Date    ABDOMINAL SURGERY      AMPUTATION FOOT / TOE Left 10/2021    5th digit     ANTERIOR CERVICAL DISCECTOMY W/ FUSION N/A 8/5/2022    Procedure: CERVICAL DISCECTOMY ANTERIOR WITH FUSION C5-6 with possible plating of C5-7 with neuromonitoring and 1 c-arm;  Surgeon: Karel Soliz MD;  Location: [facility] OR;  Service: Neurosurgery;  Laterality: N/A;    APPENDECTOMY      BACK SURGERY      CARDIAC CATHETERIZATION Left 02/08/2021    Procedure: Left Heart Cath w poss intervention left anatomical  "snuff box acess;  Surgeon: Omkar Charles DO;  Location:  [facility] CATH INVASIVE LOCATION;  Service: Cardiology;  Laterality: Left;    CARDIAC SURGERY      CATARACT EXTRACTION      CERVICAL SPINE SURGERY      COLONOSCOPY N/A 2017    Normal exam repeat in 5 years    COLONOSCOPY N/A 2019    Mild acute inflammation    COLONOSCOPY N/A 2024    Procedure: COLONOSCOPY WITH ANESTHESIA;  Surgeon: Raymond Mederos MD;  Location:  [facility] OR;  Service: Gastroenterology;  Laterality: N/A;  Pre: Anemia, Rectal bleed;  Post: Visible vessel at 20cm, clip x6 placed;  Del Shetty MD    COLONOSCOPY W/ POLYPECTOMY  2014    Hyperplastic polyp    CORONARY ARTERY BYPASS GRAFT  10/2015    ENDOSCOPY  2011    Gastritis with hemorrhage    ENDOSCOPY N/A 2017    Normal exam    ENDOSCOPY N/A 2019    Gastritis    ENDOSCOPY N/A 2020    Non-erosive gastritis with hemorrhage    ENDOSCOPY N/A 02/10/2021    Esophagitis    FOOT SURGERY Left     INCISION AND DRAINAGE OF WOUND Left 2007    spider bite     Family History   Problem Relation Age of Onset    Colon cancer Father     Heart disease Father     Colon cancer Sister     Colon polyps Sister     Alzheimer's disease Mother     Coronary artery disease Sister     Coronary artery disease Sister      Social History     Socioeconomic History    Marital status:    Tobacco Use    Smoking status: Former     Current packs/day: 0.00     Types: Cigarettes     Quit date:      Years since quittin.2    Smokeless tobacco: Never    Tobacco comments:     smoked in highschool   Vaping Use    Vaping status: Never Used   Substance and Sexual Activity    Alcohol use: No    Drug use: No    Sexual activity: Defer     Allergies   Allergen Reactions    Cefepime Hives and Anaphylaxis    Bactrim [Sulfamethoxazole-Trimethoprim] Other (See Comments)     \"RENAL FAILURE\"    Vancomycin Itching    Zolpidem Unknown - High Severity     \"makes him crazy\"    " Zolpidem Tartrate Unknown - Low Severity and Provider Review Needed    Metronidazole Rash     Current Facility-Administered Medications   Medication Dose Route Frequency Provider Last Rate Last Admin    acetaminophen (TYLENOL) tablet 650 mg  650 mg Oral Q4H PRN Raymond Mederos MD   650 mg at 04/04/24 0002    Or    acetaminophen (TYLENOL) 160 MG/5ML oral solution 650 mg  650 mg Oral Q4H PRN Raymond Mederos MD   650 mg at 03/27/24 2109    Or    acetaminophen (TYLENOL) suppository 650 mg  650 mg Rectal Q4H PRN Raymond Mederos MD        ascorbic acid (VITAMIN C) tablet 1,000 mg  1,000 mg Oral Daily Raymond Mederos MD   1,000 mg at 04/09/24 0827    sennosides-docusate (PERICOLACE) 8.6-50 MG per tablet 1 tablet  1 tablet Oral BID Raymond Mederos MD   1 tablet at 04/08/24 2107    And    polyethylene glycol (MIRALAX) packet 17 g  17 g Oral Daily Raymond Mederos MD   17 g at 04/06/24 0932    And    bisacodyl (DULCOLAX) EC tablet 5 mg  5 mg Oral Daily PRN Raymond Mederos MD        And    bisacodyl (DULCOLAX) suppository 10 mg  10 mg Rectal Daily PRN Raymond Mederos MD        bumetanide (BUMEX) tablet 2 mg  2 mg Oral Daily Vinny Hartley MD   2 mg at 04/09/24 0827    carvedilol (COREG) tablet 3.125 mg  3.125 mg Oral BID With Meals Rene Maloney MD   3.125 mg at 04/09/24 0827    dextrose (D50W) (25 g/50 mL) IV injection 25 g  25 g Intravenous Q15 Min PRN Raymond Mederos MD        dextrose (GLUTOSE) oral gel 15 g  15 g Oral Q15 Min PRN Raymond Mederos MD   15 g at 03/27/24 1710    Diclofenac Sodium (VOLTAREN) 1 % gel 2 g  2 g Topical 4x Daily PRN Raymond Mederos MD        dicyclomine (BENTYL) capsule 20 mg  20 mg Oral TID PRN Raymond Mederos MD   20 mg at 04/07/24 2050    donepezil (ARICEPT) tablet 10 mg  10 mg Oral Daily Raymond Mederos MD   10 mg at 04/09/24 0828    [Transfer Hold] glucagon (GLUCAGEN) injection 1 mg  1 mg Intramuscular Q15 Min PRN Raquel Duncan, APRN        hydrocortisone (ANUSOL-HC)  suppository 25 mg  25 mg Rectal BID Raymond Mederos MD   25 mg at 04/09/24 0828    insulin detemir (LEVEMIR) injection 30 Units  30 Units Subcutaneous Nightly Raymond Mederos MD   30 Units at 04/08/24 2106    Insulin Lispro (humaLOG) injection 4-24 Units  4-24 Units Subcutaneous 4x Daily AC & at Bedtime Raymond Mederos MD   4 Units at 04/09/24 0834    insulin regular (humuLIN R,novoLIN R) injection 10 Units  10 Units Subcutaneous TID AC Raymond Mederos MD   10 Units at 04/09/24 0834    isosorbide mononitrate (IMDUR) 24 hr tablet 30 mg  30 mg Oral Q24H Raymond Mederos MD   30 mg at 04/09/24 0828    lactulose solution 20 g  20 g Oral TID Raymond Mederos MD   20 g at 04/09/24 0828    linezolid (ZYVOX) tablet 600 mg  600 mg Oral Q12H Raymond Mederos MD   600 mg at 04/09/24 0828    magnesium hydroxide (MILK OF MAGNESIA) suspension 10 mL  10 mL Oral Daily PRN Raymond Mederos MD   10 mL at 04/03/24 0414    melatonin tablet 6 mg  6 mg Oral Nightly Raymond Mederos MD   6 mg at 04/08/24 2106    midodrine (PROAMATINE) tablet 2.5 mg  2.5 mg Oral TID AC Raymond Mederos MD   2.5 mg at 04/09/24 1226    mupirocin (BACTROBAN) 2 % nasal ointment 1 Application  1 Application Each Nare BID Raymond Mederos MD   1 Application at 04/09/24 0828    nitroglycerin (NITROSTAT) SL tablet 0.4 mg  0.4 mg Sublingual Q5 Min PRN Raymond Mederos MD        ondansetron ODT (ZOFRAN-ODT) disintegrating tablet 4 mg  4 mg Oral Q6H PRN Raymond Mederos MD   4 mg at 04/08/24 2115    Or    ondansetron (ZOFRAN) injection 4 mg  4 mg Intravenous Q6H PRN Raymond Mederos MD   4 mg at 03/29/24 1837    oxyCODONE (ROXICODONE) immediate release tablet 10 mg  10 mg Oral BID PRN Raymond Mederos MD   10 mg at 04/09/24 1039    pantoprazole (PROTONIX) EC tablet 40 mg  40 mg Oral BID AC Rene Maloney MD   40 mg at 04/09/24 0834    pregabalin (LYRICA) capsule 100 mg  100 mg Oral Nightly Rene Maloney MD   100 mg at 04/08/24 2106    pregabalin (LYRICA) capsule 50  mg  50 mg Oral Daily Rene Maloney MD   50 mg at 04/09/24 0834    rosuvastatin (CRESTOR) tablet 10 mg  10 mg Oral Nightly Raymond Mederos MD   10 mg at 04/08/24 2106    sodium chloride 0.9 % flush 10 mL  10 mL Intravenous PRN Raymond Mederos MD        sodium chloride 0.9 % flush 10 mL  10 mL Intravenous Q12H Raymond Mederos MD   10 mL at 04/07/24 2050    sodium chloride 0.9 % flush 10 mL  10 mL Intravenous PRN Raymond Mederos MD        sodium chloride 0.9 % flush 10 mL  10 mL Intravenous Q12H Tj Hardwick CRNA        sodium chloride 0.9 % flush 10 mL  10 mL Intravenous PRN Tj Hardwick CRNA        sodium chloride 0.9 % infusion 40 mL  40 mL Intravenous PRN Raymond Mederos MD        sodium chloride 0.9 % infusion 40 mL  40 mL Intravenous PRN Tj Hardwick CRNA        sodium chloride 0.9 % infusion  100 mL/hr Intravenous Continuous Tj Hardwick CRNA        sucralfate (CARAFATE) 1 GM/10ML suspension 1 g  1 g Oral TID AC Raymond Mederos MD   1 g at 04/09/24 1226    tamsulosin (FLOMAX) 24 hr capsule 0.4 mg  0.4 mg Oral Daily Raymond Mederos MD   0.4 mg at 04/09/24 0827    timolol (TIMOPTIC) 0.25 % ophthalmic solution 1 drop  1 drop Both Eyes Q12H Raymond Mederos MD   1 drop at 04/09/24 0829    zinc sulfate (ZINCATE) capsule 220 mg  220 mg Oral Daily Raymond Mederos MD   220 mg at 04/09/24 0827     Review of Systems   Constitutional:  Negative for activity change.   HENT:  Negative for congestion.    Respiratory:  Negative for apnea and shortness of breath.    Gastrointestinal:  Negative for abdominal pain.   Musculoskeletal:  Positive for arthralgias. Negative for back pain.   Skin:  Positive for wound.   Neurological:  Positive for numbness.   Psychiatric/Behavioral:  Negative for agitation, behavioral problems and confusion.        OBJECTIVE     Vitals:    04/09/24 1549   BP: 115/46   Pulse: 69   Resp: 18   Temp: 97.4 °F (36.3 °C)   SpO2: 100%       PHYSICAL EXAM  GEN:   Pt presents up in bedside chair.  Accompanied by none.     Foot/Ankle Exam    GENERAL  Appearance:  appears stated age  Orientation:  AAOx3  Affect:  appropriate    VASCULAR     Right Foot Vascularity   Dorsalis pedis:  2+  Posterior tibial:  2+  Skin temperature:  warm  Edema grading:  Trace  CFT:  3  Varicosities:  none     Left Foot Vascularity   Posterior tibial:  2+  Skin temperature:  warm  Edema grading:  Trace  CFT:  3  Pedal hair growth:  Absent  Varicosities:  none     NEUROLOGIC     Right Foot Neurologic   Light touch sensation: diminished  Vibratory sensation: diminished  Hot/Cold sensation: diminished     Left Foot Neurologic   Light touch sensation: diminished  Vibratory sensation: diminished  Hot/Cold sensation:  diminished    MUSCULOSKELETAL     Right Foot Musculoskeletal   Tenderness:  none       Left Foot Musculoskeletal    Amputation   Toes amputated: fifth toe  Tenderness:  none    MUSCLE STRENGTH     Right Foot Muscle Strength   Foot dorsiflexion:  5  Foot plantar flexion:  5  Foot inversion:  5  Foot eversion:  5     Left Foot Muscle Strength   Foot dorsiflexion:  4+  Foot plantar flexion:  4+  Foot inversion:  4+  Foot eversion:  4+    DERMATOLOGIC      Right Foot Dermatologic   Skin  Positive for wound.      Left Foot Dermatologic   Skin  Positive for wound.      Left foot additional comments: Multiple wounds- significant improvement of all three foot wounds since last week.   See photos    Image:                            RADIOLOGY/NUCLEAR:  NM GI Blood Loss    Result Date: 4/6/2024  Narrative: EXAM: NM GI BLOOD LOSS-  INDICATION: Lower GI bleed (Ped 0-17y)  RADIOPHARMACEUTICAL: Tc-99m labeled red cells 27.6 mCi IV  TECHNICAL: The patient's red blood cells were labeled in vitro and reinjected.  Sequential five-minute anterior planar images of the abdomen were obtained for two hours.  These were also reformatted into cine mode.  COMPARISON: CT abdomen pelvis 7/22/2023  FINDINGS:  No evidence of an active GI bleed.       Impression:  No evidence of an active GI bleed.  This report was signed and finalized on 4/6/2024 1:31 PM by Ronal Wisdom.      XR Abdomen KUB    Result Date: 4/3/2024  Narrative: EXAMINATION: XR ABDOMEN KUB- 4/3/2024 1:45 PM  HISTORY: abdominal distention; E11.628-Type 2 diabetes mellitus with other skin complications; L08.9-Local infection of the skin and subcutaneous tissue, unspecified; E11.65-Type 2 diabetes mellitus with hyperglycemia; Z74.09-Other reduced mobility.  REPORT: Supine imaging of the abdomen was performed.  COMPARISON: KUB 10/7/2023.  A large amount of stool seen within the transverse colon and flexures, likely representing constipation. No evidence of bowel obstruction is identified. There are no definite urinary tract calculi. Cholecystectomy clips are present. No acute osseous abnormality is identified.      Impression: Extensive constipation within the transverse colon and flexures.  This report was signed and finalized on 4/3/2024 1:46 PM by Dr. Percy Cesar MD.      Adult Transthoracic Echo Complete W/ Cont if Necessary Per Protocol    Result Date: 4/2/2024  Narrative:   The study is technically difficult for diagnosis.  If there is concern for possible infective endocarditis, recommend transesophageal echocardiogram to better visualize the valves.   Left ventricular systolic function is normal. Left ventricular ejection fraction appears to be 61 - 65%.   Left ventricular wall thickness is consistent with mild concentric hypertrophy   Left ventricular diastolic function is consistent with (grade III w/high LAP) fixed restrictive pattern.   Mildly reduced right ventricular systolic function noted.   The left atrial cavity is mildly dilated.   There is mild calcification of the aortic valve. No aortic valve regurgitation is present. No hemodynamically significant aortic valve stenosis is present.     MRI Foot Left With & Without Contrast    Result Date: 3/29/2024  Narrative:  EXAMINATION: MRI FOOT LEFT W WO CONTRAST-  3/29/2024 4:30 PM  HISTORY: Foot swelling, diabetic, osteomyelitis suspected, xray done; E11.628-Type 2 diabetes mellitus with other skin complications; L08.9-Local infection of the skin and subcutaneous tissue, unspecified; E11.65-Type 2 diabetes mellitus with hyperglycemia; Z74.09-Other reduced mobility  LEFT foot MRI without and with IV gadolinium contrast. Axial, sagittal, and coronal sequences.  COMPARISON: LEFT foot x-rays from 3/26/2024.  There is a tiny metal foreign body within the plantar soft tissues adjacent to the second toe and despite the small size it produces a prominent amount of artifact related to image distortion.  I see no soft tissue abscess.  No discrete bone edema or bone enhancement is seen to indicate osteomyelitis.      Impression: 1. There are some limitations to the study based on metal related artifact. 2. No soft tissue abscess or definite bone marrow edema or enhancement is seen to indicate osteomyelitis.    This report was signed and finalized on 3/29/2024 6:58 PM by Dr. Gomez Mcgill MD.      XR Foot 3+ View Left    Result Date: 3/26/2024  Narrative: EXAMINATION: XR FOOT 3+ VW LEFT-  3/26/2024 4:19 PM  HISTORY: foot infection  3 view LEFT foot exam.  Previous amputation of the mid/distal fifth metatarsal.  Interval resection or resorption of the fourth metatarsal head.  No bone destruction or soft tissue air is seen.  High-grade degenerative disease at the first metatarsal phalangeal joint.  Unchanged appearance of prominent dorsal midfoot spurring and prominent inferior calcaneal spurring.  Mild soft tissue edema over the dorsum of the foot is less prominent as compared with the previous exam.      Impression: 1. No bone destruction or soft tissue air is seen. 2. Prominent degenerative spurring.    This report was signed and finalized on 3/26/2024 5:25 PM by Dr. Gomez Mcgill MD.      XR Chest 1 View    Result Date: 3/26/2024  Narrative:  EXAM: XR CHEST 1 VW-  DATE: 3/26/2024 1:15 PM  HISTORY: ams   COMPARISON: 8/28/2023.  TECHNIQUE:  Frontal view(s) of the chest submitted.  FINDINGS:  Patient is status post median sternotomy and CABG. There is enlargement of the cardiac silhouette. There are low lung volumes with vascular crowding versus mild volume overload. No effusion or pneumothorax is seen.       Impression:  1. Postoperative chest and low lung volumes with vascular crowding versus mild volume overload.  This report was signed and finalized on 3/26/2024 2:34 PM by Eran Christina.      CT Head Without Contrast    Result Date: 3/26/2024  Narrative: EXAM: CT HEAD WO CONTRAST-  DATE: 3/26/2024 1:03 PM  HISTORY: ams   COMPARISON: 10/10/2023.  DOSE LENGTH PRODUCT: 876.8 mGy.cm  Automated exposure control was also utilized to decrease patient radiation dose.  TECHNIQUE: Unenhanced CT images obtained from vertex to skull base with multiplanar reformats.  FINDINGS: There is no acute intracranial hemorrhage, midline shift, mass effect, or hydrocephalus. There is no CT evidence for acute infarct.  There are chronic changes with volume loss and chronic small vessel ischemic change of the periventricular white matter.  SOFT TISSUES: The scalp soft tissues are unremarkable.   SINUS: There is partially imaged mucosal thickening at the right maxillary sinus.  ORBITS: The visualized orbits and globes are unremarkable. There is bilateral lens extraction.       Impression: 1. Chronic changes and no acute intracranial findings.  This report was signed and finalized on 3/26/2024 2:10 PM by Eran Christina.       LABORATORY/CULTURE RESULTS:  Results from last 7 days   Lab Units 04/09/24  1237 04/09/24  0611 04/09/24  0004 04/08/24  1218 04/08/24  0623 04/07/24  1429 04/07/24  0422   WBC 10*3/mm3  --  4.35  --   --  6.19  --  5.70   HEMOGLOBIN g/dL 8.4* 9.2* 8.4*   < > 8.7*   < > 9.2*   HEMATOCRIT % 26.5* 29.0* 26.5*   < > 27.0*   < > 29.5*   PLATELETS 10*3/mm3   --  231  --   --  246  --  275    < > = values in this interval not displayed.     Results from last 7 days   Lab Units 04/09/24  0610 04/08/24  0623 04/07/24  0422 04/06/24  0340 04/05/24  0512   SODIUM mmol/L 141 141 141   < > 139  139   POTASSIUM mmol/L 4.1 4.0 4.0   < > 4.4  4.4   CHLORIDE mmol/L 104 104 103   < > 102  102   CO2 mmol/L 26.0 25.0 27.0   < > 25.0  25.0   BUN mg/dL 28* 30* 33*   < > 31*  31*   CREATININE mg/dL 1.84* 1.61* 1.75*   < > 1.98*  1.98*   CALCIUM mg/dL 9.4 9.1 10.1   < > 11.1*  11.1*   BILIRUBIN mg/dL  --  0.4  --   --  0.4   ALK PHOS U/L  --  107  --   --  138*   ALT (SGPT) U/L  --  14  --   --  18   AST (SGOT) U/L  --  16  --   --  22   GLUCOSE mg/dL 170* 82 94   < > 173*  173*    < > = values in this interval not displayed.     Results from last 7 days   Lab Units 04/06/24  0340   INR  1.13*   APTT seconds 39.2*     Microbiology Results (last 10 days)       Procedure Component Value - Date/Time    Blood Culture With DARSHAN - Blood, Hand, Right [[id number]]  (Normal) Collected: 04/01/24 0402    Lab Status: Final result Specimen: Blood from Hand, Right Updated: 04/06/24 0445     Blood Culture No growth at 5 days            PATHOLOGY RESULTS:       ASSESSMENT/PLAN   Diabetic foot ulcerations to left foot  Type 2 diabetes mellitus with hyperglycemia  Diabetic polyneuropathy  Sepsis   Metabolic encephalopathy secondary to sepsis-improving    Continue wound care twice daily with Betadine soaked gauze wet-to-dry to be changed by nursing.    MRI impression shows no soft tissue abscess, no definite bone marrow edema, and no enhancement seen to indicate osteomyelitis at this time.    Continue antibiotics per infectious disease.     Patient to follow up with Dr. Gomes at [facility] upon discharge.     Will continue to follow patient while admitted.       This document has been electronically signed by SUSAN Perez on April 9, 2024 16:22 CDT            Electronically signed by  Asad Cerrato, MARCELM at 04/09/24 1824

## 2024-04-10 NOTE — PROGRESS NOTES
Nephrology ([facility]) Progress Note      Patient:  Erick Luong  YOB: 1955  Date of Service: 4/10/2024  MRN: [id number]   Acct: [id number]   Primary Care Physician: Del Shetty MD  Advance Directive:   Code Status and Medical Interventions:   Ordered at: 03/26/24 1815     Level Of Support Discussed With:    Health Care Surrogate     Code Status (Patient has no pulse and is not breathing):    CPR (Attempt to Resuscitate)     Medical Interventions (Patient has pulse or is breathing):    Full Support     Admit Date: 3/26/2024       Hospital Day: 15  Referring Provider: No ref. provider found      Patient personally seen and examined.  Complete chart including Consults, Notes, Operative Reports, Labs, Cardiology, and Radiology studies reviewed as able.        Subjective:  Erick Luong is a 68 y.o. male for whom we were consulted for evaluation and treatment of acute kidney injury.  He has stage IIIb chronic kidney disease baseline, follows with Dr Lomas in our office.  Baseline creatinine approximately 1.8. He has history of insulin-dependent type 2 diabetes, hypertension, abdominal obesity, obstructive sleep apnea, diabetic foot ulcer and coronary artery disease.   Patient presented to ER on 3/26 with altered mental status. Had debridement of ongoing wound on left foot the day prior. Left foot warm and erythremic on arrival to ER. Noted to have elevated blood glucose over 400. Initial creatinine of 1.9.  Admitted to medical floor for sepsis due to left foot wound. Patient has been agitated and confused during the admission, requiring wrist restraints due to pulling at lines and tubes. Renal function and mentation have been improving. Now has a sitter present in room due to repeatedly getting up unattended. He is s/p colonoscopy on 4/07 due to rectal bleding.     Today is awake and alert. No new complaints or overnight issues. Urine output nonoliguric      Allergies:  Cefepime, Bactrim [sulfamethoxazole-trimethoprim], Vancomycin, Zolpidem, Zolpidem tartrate, and Metronidazole    Home Meds:  Medications Prior to Admission   Medication Sig Dispense Refill Last Dose    amitriptyline (ELAVIL) 25 MG tablet Take 2 tablets by mouth Daily at bedtime. (Patient not taking: Reported on 3/27/2024) 60 tablet 1 Not Taking    apixaban (ELIQUIS) 5 MG tablet tablet Take 1 tablet by mouth Every 12 (Twelve) Hours.       ascorbic acid (VITAMIN C) 1000 MG tablet Take 1 tablet by mouth Daily. 30 tablet 3     Aspirin 81 MG capsule Take 81 mg by mouth Daily.       bumetanide (BUMEX) 1 MG tablet Take 1 tablet every day by oral route for 30 days. 30 tablet 0     bumetanide (BUMEX) 2 MG tablet Take 1 tablet by mouth Daily. Take 2 tablets in morning;1 tablet at night (Patient not taking: Reported on 3/27/2024)   Not Taking    busPIRone (BUSPAR) 10 MG tablet Take 1 tablet by mouth 3 (Three) Times a Day.       calcitriol (ROCALTROL) 0.5 MCG capsule Take 1 capsule by mouth Daily. 90 capsule 4     calcitriol (ROCALTROL) 0.5 MCG capsule Take 1 capsule every day by oral route for 90 days. (Patient not taking: Reported on 3/27/2024) 90 capsule 4 Not Taking    carvedilol (COREG) 3.125 MG tablet Take 1 tablet twice a day by oral route. 60 tablet 4     carvedilol (COREG) 6.25 MG tablet Take 1 tablet by mouth 2 (Two) Times a Day. (Patient not taking: Reported on 3/27/2024) 180 tablet 4 Not Taking    Cholecalciferol (D-5000) 125 MCG (5000 UT) tablet Take 1 tablet by mouth Daily Before Lunch. 30 tablet 2     cloNIDine (CATAPRES) 0.1 MG tablet Take 1 tablet by mouth Every 12 (Twelve) Hours. (Patient not taking: Reported on 3/27/2024) 60 tablet 2 Not Taking    Diclofenac Sodium (VOLTAREN) 1 % gel gel Apply 2 g topically to the appropriate area as directed 4 (Four) Times a Day As Needed. 300 g 11     Diclofenac Sodium (VOLTAREN) 1 % gel gel Apply 2 g topically to the appropriate area as directed 4 (Four)  Times a Day. (Patient not taking: Reported on 3/27/2024) 300 g 11 Not Taking    donepezil (ARICEPT) 10 MG tablet Take 1 tablet by mouth Daily. (Patient not taking: Reported on 3/27/2024) 90 tablet 4 Not Taking    Dulaglutide (Trulicity) 4.5 MG/0.5ML solution pen-injector Inject 0.5 mL under the skin into the appropriate area as directed 1 (One) Time Per Week. (Patient not taking: Reported on 3/27/2024) 2 mL 11 Not Taking    DULoxetine (CYMBALTA) 60 MG capsule Take 1 capsule by mouth Daily. 90 capsule 4     DULoxetine (CYMBALTA) 60 MG capsule Take 1 capsule by mouth Daily. (Patient not taking: Reported on 3/27/2024) 90 capsule 4 Not Taking    DULoxetine (CYMBALTA) 60 MG capsule Take 1 capsule by mouth Daily. (Patient not taking: Reported on 3/27/2024) 90 capsule 4 Not Taking    empagliflozin (JARDIANCE) 25 MG tablet tablet Take 1 tablet by mouth Daily. (Patient not taking: Reported on 3/27/2024) 30 tablet 2 Not Taking    famotidine (PEPCID) 20 MG tablet Take 1 tablet twice a day by oral route. 180 tablet 4     fluticasone (FLONASE) 50 MCG/ACT nasal spray 1 spray into the nostril(s) as directed by provider Daily. (Patient taking differently: 1 spray into the nostril(s) as directed by provider 2 (Two) Times a Day.) 16 g 1     HYDROcodone-acetaminophen (NORCO) 7.5-325 MG per tablet Take 1 tablet by mouth 2 (Two) Times a Day As Needed. (Patient not taking: Reported on 3/27/2024) 40 tablet 0 Not Taking    Insulin Glargine (Lantus SoloStar) 100 UNIT/ML injection pen Inject 20 Units under the skin into the appropriate area as directed Every Evening. 15 mL 3     Insulin Regular Human, Conc, (HumuLIN R U-500 KwikPen) 500 UNIT/ML solution pen-injector CONCENTRATED injection Inject 120 Units under the skin into the appropriate area as directed 2 (Two) Times a Day with breakfast and dinner. (Patient not taking: Reported on 3/27/2024) 18 mL 5 Not Taking    Insulin Regular Human, Conc, (HumuLIN R U-500 KwikPen) 500 UNIT/ML  solution pen-injector CONCENTRATED injection Inject 40 Units under the skin into the appropriate area with regular meals AND 40 Units with large meals. 54 mL 3     isosorbide mononitrate (IMDUR) 30 MG 24 hr tablet Take 1 tablet by mouth Daily.       magnesium hydroxide (Milk of Magnesia) 400 MG/5ML suspension Take 30 mL every day by oral route for 30 days. 900 mL 0     magnesium hydroxide (Milk of Magnesia) 400 MG/5ML suspension Take 30mL by mouth once daily for 30 days. (Patient not taking: Reported on 3/27/2024) 900 mL 0 Not Taking    melatonin 3 MG tablet Take 1 tablet by mouth At Night As Needed for Sleep.       methylcellulose (Citrucel) oral powder Mix 5g as directed and drink twice daily for 90 days. 900 g 10     metoclopramide (REGLAN) 5 MG tablet Take 1 tablet by mouth 3 times a day.       midodrine (PROAMATINE) 2.5 MG tablet Take 1 tablet by mouth 3 (Three) Times a Day Before Meals.       nitroglycerin (NITROSTAT) 0.4 MG SL tablet Dissolve 1 tablet by mouth every 5 minutes as needed for chest pain; MAX 3 tabs/24 hours.  If no improvement after 3 tabs go to ER 25 tablet 0     ondansetron (ZOFRAN) 4 MG tablet Take 1 tablet by mouth Every 6 (Six) Hours As Needed for Nausea or Vomiting.       oxyCODONE (ROXICODONE) 10 MG tablet Take 1 tablet by mouth twice a day as needed 60 tablet 0     pantoprazole (Protonix) 40 MG EC tablet Take 1 tablet by mouth 2 (Two) Times a Day. (Patient not taking: Reported on 3/27/2024) 180 tablet 4 Not Taking    polyethylene glycol (MIRALAX) 17 g packet Take 17 g by mouth Daily. Obtain OTC       prazosin (MINIPRESS) 1 MG capsule Take 1 capsule by mouth every night at bedtime. 30 capsule 2     pregabalin (LYRICA) 100 MG capsule Take 2 capsules by mouth Every Night.       pregabalin (LYRICA) 50 MG capsule Take 1 capsule by mouth Daily.       rosuvastatin (CRESTOR) 10 MG tablet Take 1 tablet by mouth Daily.       sennosides-docusate (PERICOLACE) 8.6-50 MG per tablet Take 1 tablet by  mouth Every Night. Obtain OTC       sennosides-docusate (PERICOLACE) 8.6-50 MG per tablet Take 1 tablet by mouth every night at bedtime. (Patient not taking: Reported on 3/27/2024) 30 tablet 2 Not Taking    sodium hypochlorite (DAKIN'S 1/4 STRENGTH) 0.125 % solution topical solution 0.125% Apply to affected area twice daily (Patient not taking: Reported on 3/27/2024) 473 mL 3 Not Taking    sodium hypochlorite (DAKIN'S 1/4 STRENGTH) 0.125 % solution topical solution 0.125% Apply to affected area twice daily as directed (Patient not taking: Reported on 3/27/2024) 473 mL 5 Not Taking    sodium hypochlorite (DAKIN'S) 0.25 % topical solution Use to wound daily as instructed 473 mL 1     sucralfate (CARAFATE) 1 g tablet Take 1 tablet by mouth 3 times a day.       tamsulosin (FLOMAX) 0.4 MG capsule 24 hr capsule Take 1 capsule by mouth Daily. 90 capsule 1     timolol (TIMOPTIC) 0.5 % ophthalmic solution Administer 1 drop to both eyes 2 (Two) Times a Day.       valsartan (DIOVAN) 40 MG tablet Take 1 tablet by mouth Daily.       zinc sulfate (ZINCATE) 50 MG capsule Take 1 capsule by mouth Daily.          Medicines:  Current Facility-Administered Medications   Medication Dose Route Frequency Provider Last Rate Last Admin    acetaminophen (TYLENOL) tablet 650 mg  650 mg Oral Q4H PRN Raymond Mederos MD   650 mg at 04/04/24 0002    Or    acetaminophen (TYLENOL) 160 MG/5ML oral solution 650 mg  650 mg Oral Q4H PRN Raymond Mederos MD   650 mg at 03/27/24 2109    Or    acetaminophen (TYLENOL) suppository 650 mg  650 mg Rectal Q4H PRN Raymond Mederos MD        ascorbic acid (VITAMIN C) tablet 1,000 mg  1,000 mg Oral Daily Raymond Mederos MD   1,000 mg at 04/10/24 0828    sennosides-docusate (PERICOLACE) 8.6-50 MG per tablet 1 tablet  1 tablet Oral BID Raymond Mederos MD   1 tablet at 04/09/24 2107    And    polyethylene glycol (MIRALAX) packet 17 g  17 g Oral Daily Raymond Mederos MD   17 g at 04/06/24 0932    And    bisacodyl  (DULCOLAX) EC tablet 5 mg  5 mg Oral Daily PRN Raymond Mederos MD        And    bisacodyl (DULCOLAX) suppository 10 mg  10 mg Rectal Daily PRN Raymond Mederos MD        bumetanide (BUMEX) tablet 2 mg  2 mg Oral Daily Vinny Hartley MD   2 mg at 04/10/24 0827    carvedilol (COREG) tablet 3.125 mg  3.125 mg Oral BID With Meals Rene Maloney MD   3.125 mg at 04/10/24 0828    dextrose (D50W) (25 g/50 mL) IV injection 25 g  25 g Intravenous Q15 Min PRN Raymond Mederos MD        dextrose (GLUTOSE) oral gel 15 g  15 g Oral Q15 Min PRN Raymond Mederos MD   15 g at 03/27/24 1710    Diclofenac Sodium (VOLTAREN) 1 % gel 2 g  2 g Topical 4x Daily PRN Raymond Mederos MD        dicyclomine (BENTYL) capsule 20 mg  20 mg Oral TID PRN Raymond Mederos MD   20 mg at 04/07/24 2050    donepezil (ARICEPT) tablet 10 mg  10 mg Oral Daily Raymond Mederos MD   10 mg at 04/10/24 0827    [Transfer Hold] glucagon (GLUCAGEN) injection 1 mg  1 mg Intramuscular Q15 Min PRN Raquel Duncan, APRN        hydrocortisone (ANUSOL-HC) suppository 25 mg  25 mg Rectal BID Raymond Mederos MD   25 mg at 04/09/24 0828    insulin detemir (LEVEMIR) injection 30 Units  30 Units Subcutaneous Nightly Raymond Mederos MD   15 Units at 04/09/24 2117    Insulin Lispro (humaLOG) injection 4-24 Units  4-24 Units Subcutaneous 4x Daily AC & at Bedtime Raymond Mederos MD   4 Units at 04/09/24 1732    insulin regular (humuLIN R,novoLIN R) injection 10 Units  10 Units Subcutaneous TID AC Raymond Mederos MD   10 Units at 04/10/24 0756    isosorbide mononitrate (IMDUR) 24 hr tablet 30 mg  30 mg Oral Q24H Raymond Mederos MD   30 mg at 04/10/24 0829    lactulose solution 20 g  20 g Oral TID Raymond Mederos MD   20 g at 04/09/24 2107    linezolid (ZYVOX) tablet 600 mg  600 mg Oral Q12H Raymond Mederos MD   600 mg at 04/10/24 0828    magnesium hydroxide (MILK OF MAGNESIA) suspension 10 mL  10 mL Oral Daily PRN Raymond Mederos MD   10 mL at 04/03/24 0411     melatonin tablet 6 mg  6 mg Oral Nightly Raymond Mederos MD   6 mg at 04/09/24 2107    midodrine (PROAMATINE) tablet 2.5 mg  2.5 mg Oral TID AC Raymond Mederos MD   2.5 mg at 04/10/24 0828    mupirocin (BACTROBAN) 2 % nasal ointment 1 Application  1 Application Each Nare BID Raymond Mederos MD   1 Application at 04/10/24 0827    nitroglycerin (NITROSTAT) SL tablet 0.4 mg  0.4 mg Sublingual Q5 Min PRN Raymond Mederos MD        ondansetron ODT (ZOFRAN-ODT) disintegrating tablet 4 mg  4 mg Oral Q6H PRN Raymond Mederos MD   4 mg at 04/10/24 0834    Or    ondansetron (ZOFRAN) injection 4 mg  4 mg Intravenous Q6H PRN Raymond Mederos MD   4 mg at 03/29/24 1837    oxyCODONE (ROXICODONE) immediate release tablet 10 mg  10 mg Oral BID PRN Raymond Mederos MD   10 mg at 04/09/24 2205    pantoprazole (PROTONIX) EC tablet 40 mg  40 mg Oral BID  Rene Maloney MD   40 mg at 04/10/24 0827    pregabalin (LYRICA) capsule 100 mg  100 mg Oral Nightly Rene Maloney MD   100 mg at 04/09/24 2106    pregabalin (LYRICA) capsule 50 mg  50 mg Oral Daily Rene Maloney MD   50 mg at 04/10/24 0828    rosuvastatin (CRESTOR) tablet 10 mg  10 mg Oral Nightly Raymond Mederos MD   10 mg at 04/09/24 2107    sodium chloride 0.9 % flush 10 mL  10 mL Intravenous PRN Raymond Mederos MD        sodium chloride 0.9 % flush 10 mL  10 mL Intravenous Q12H Raymond Mederos MD   10 mL at 04/07/24 2050    sodium chloride 0.9 % flush 10 mL  10 mL Intravenous PRN Raymond Mederos MD        sodium chloride 0.9 % flush 10 mL  10 mL Intravenous Q12H Tj Hardwick CRNA        sodium chloride 0.9 % flush 10 mL  10 mL Intravenous PRN Tj Hardwick CRNA        sodium chloride 0.9 % infusion 40 mL  40 mL Intravenous PRN Raymond Mederos MD        sodium chloride 0.9 % infusion 40 mL  40 mL Intravenous PRN Tj Hardwick CRNA        sodium chloride 0.9 % infusion  100 mL/hr Intravenous Continuous Tj Hardwick CRNA        sucralfate (CARAFATE) 1 GM/10ML  suspension 1 g  1 g Oral TID AC Raymond Mederos MD   1 g at 04/10/24 0827    tamsulosin (FLOMAX) 24 hr capsule 0.4 mg  0.4 mg Oral Daily Raymond Mederos MD   0.4 mg at 04/10/24 0828    timolol (TIMOPTIC) 0.25 % ophthalmic solution 1 drop  1 drop Both Eyes Q12H Raymond Mederos MD   1 drop at 04/10/24 0829    zinc sulfate (ZINCATE) capsule 220 mg  220 mg Oral Daily Raymond Mederos MD   220 mg at 04/10/24 0827       Past Medical History:  Past Medical History:   Diagnosis Date    Arthritis     Autonomic disease     CAD (coronary artery disease) 02/06/2017    Cervical radiculopathy 09/16/2021    Chronic constipation with acute exaccerbation 05/10/2021    Coronary artery disease     Degeneration of cervical intervertebral disc 08/11/2021    Diabetes mellitus     Diabetic foot ulcer 08/31/2020    Diabetic polyneuropathy associated with type 2 diabetes mellitus 01/18/2021    Elevated cholesterol     Gastroesophageal reflux disease 05/13/2019    Headache     HTN (hypertension), benign 05/03/2017    Hyperlipidemia     Hypertension     Mixed hyperlipidemia 02/07/2017    Multiple lung nodules 01/26/2020    5mm, 9 mm RLL identified 1/2020, not present 10/2019.    Myocardial infarction     Osteomyelitis 01/22/2020    Osteomyelitis of fifth toe of right foot 10/07/2019    Pancreatitis     Persistent insomnia 01/20/2020    Renal disorder     Sleep apnea 02/06/2017    Sleep apnea with use of continuous positive airway pressure (CPAP)     NON-COMPLIANT    Slow transit constipation 01/16/2019    Spinal stenosis in cervical region 09/16/2021    Vitamin D deficiency 03/02/2021       Past Surgical History:  Past Surgical History:   Procedure Laterality Date    ABDOMINAL SURGERY      AMPUTATION FOOT / TOE Left 10/2021    5th digit     ANTERIOR CERVICAL DISCECTOMY W/ FUSION N/A 8/5/2022    Procedure: CERVICAL DISCECTOMY ANTERIOR WITH FUSION C5-6 with possible plating of C5-7 with neuromonitoring and 1 c-arm;  Surgeon: Karel Soliz  MD;  Location:  [facility] OR;  Service: Neurosurgery;  Laterality: N/A;    APPENDECTOMY      BACK SURGERY      CARDIAC CATHETERIZATION Left 2021    Procedure: Left Heart Cath w poss intervention left anatomical snuff box acess;  Surgeon: Omkar Charles DO;  Location:  [facility] CATH INVASIVE LOCATION;  Service: Cardiology;  Laterality: Left;    CARDIAC SURGERY      CATARACT EXTRACTION      CERVICAL SPINE SURGERY      COLONOSCOPY N/A 2017    Normal exam repeat in 5 years    COLONOSCOPY N/A 2019    Mild acute inflammation    COLONOSCOPY N/A 2024    Procedure: COLONOSCOPY WITH ANESTHESIA;  Surgeon: Raymond Mederos MD;  Location:  [facility] OR;  Service: Gastroenterology;  Laterality: N/A;  Pre: Anemia, Rectal bleed;  Post: Visible vessel at 20cm, clip x6 placed;  Del Shetty MD    COLONOSCOPY W/ POLYPECTOMY  2014    Hyperplastic polyp    CORONARY ARTERY BYPASS GRAFT  10/2015    ENDOSCOPY  2011    Gastritis with hemorrhage    ENDOSCOPY N/A 2017    Normal exam    ENDOSCOPY N/A 2019    Gastritis    ENDOSCOPY N/A 2020    Non-erosive gastritis with hemorrhage    ENDOSCOPY N/A 02/10/2021    Esophagitis    FOOT SURGERY Left     INCISION AND DRAINAGE OF WOUND Left 2007    spider bite       Family History  Family History   Problem Relation Age of Onset    Colon cancer Father     Heart disease Father     Colon cancer Sister     Colon polyps Sister     Alzheimer's disease Mother     Coronary artery disease Sister     Coronary artery disease Sister        Social History  Social History     Socioeconomic History    Marital status:    Tobacco Use    Smoking status: Former     Current packs/day: 0.00     Types: Cigarettes     Quit date:      Years since quittin.2    Smokeless tobacco: Never    Tobacco comments:     smoked in highschool   Vaping Use    Vaping status: Never Used   Substance and Sexual Activity    Alcohol use: No    Drug use: No    Sexual  activity: Defer       Review of Systems:  History obtained from chart review and the patient  General ROS: No fever or chills  Respiratory ROS: No cough, shortness of breath, wheezing  Cardiovascular ROS: No chest pain or palpitations  Gastrointestinal ROS: No abdominal pain or melena  Genito-Urinary ROS: No dysuria or hematuria  Psych ROS: No anxiety and depression  14 point ROS reviewed with the patient and negative except as noted above and in the HPI unless unable to obtain.    Objective:  Patient Vitals for the past 24 hrs:   BP Temp Temp src Pulse Resp SpO2   04/10/24 0805 109/42 97.6 °F (36.4 °C) -- 59 16 98 %   04/10/24 0418 110/50 97.6 °F (36.4 °C) Oral 61 16 98 %   04/10/24 0026 121/53 98.2 °F (36.8 °C) Oral 67 18 98 %   04/09/24 2012 133/70 97.4 °F (36.3 °C) Oral 65 18 99 %   04/09/24 1549 115/46 97.4 °F (36.3 °C) Oral 69 18 100 %   04/09/24 1305 120/85 97.3 °F (36.3 °C) Oral 78 16 95 %       Intake/Output Summary (Last 24 hours) at 4/10/2024 1114  Last data filed at 4/10/2024 0926  Gross per 24 hour   Intake 240 ml   Output 1000 ml   Net -760 ml     General: awake/alert   Chest:  clear to auscultation bilaterally without respiratory distress  CVS: regular rate and rhythm  Abdominal: soft, nontender, positive bowel sounds  Extremities:  bilateral 1+ edema  Skin: warm and dry without rash      Labs:  Results from last 7 days   Lab Units 04/10/24  0552 04/10/24  0004 04/09/24  1747 04/09/24  1237 04/09/24  0611 04/08/24  1218 04/08/24  0623   WBC 10*3/mm3 4.60  --   --   --  4.35  --  6.19   HEMOGLOBIN g/dL 8.2* 8.0* 8.7*   < > 9.2*   < > 8.7*   HEMATOCRIT % 26.3* 25.6* 26.5*   < > 29.0*   < > 27.0*   PLATELETS 10*3/mm3 173  --   --   --  231  --  246    < > = values in this interval not displayed.         Results from last 7 days   Lab Units 04/10/24  0553 04/09/24  0610 04/08/24  0623 04/06/24  0340 04/05/24  0512   SODIUM mmol/L 141 141 141   < > 139  139   POTASSIUM mmol/L 4.1 4.1 4.0   < > 4.4  4.4    CHLORIDE mmol/L 105 104 104   < > 102  102   CO2 mmol/L 26.0 26.0 25.0   < > 25.0  25.0   BUN mg/dL 27* 28* 30*   < > 31*  31*   CREATININE mg/dL 1.85* 1.84* 1.61*   < > 1.98*  1.98*   CALCIUM mg/dL 9.2 9.4 9.1   < > 11.1*  11.1*   EGFR mL/min/1.73 39.4* 39.7* 46.6*   < > 36.3*  36.3*   BILIRUBIN mg/dL  --   --  0.4  --  0.4   ALK PHOS U/L  --   --  107  --  138*   ALT (SGPT) U/L  --   --  14  --  18   AST (SGOT) U/L  --   --  16  --  22   GLUCOSE mg/dL 124* 170* 82   < > 173*  173*    < > = values in this interval not displayed.       Radiology:   Imaging Results (Last 72 Hours)       ** No results found for the last 72 hours. **            Culture:  Blood Culture   Date Value Ref Range Status   03/26/2024 Staphylococcus aureus, MRSA (C)  Final     Comment:       Infectious disease consultation is highly recommended to rule out distant foci of infection.  Methicillin resistant Staphylococcus aureus, Patient may be an isolation risk.   03/26/2024 Staphylococcus aureus, MRSA (C)  Final     Comment:       Infectious disease consultation is highly recommended to rule out distant foci of infection.  Methicillin resistant Staphylococcus aureus, Patient may be an isolation risk.         Assessment    Acute kidney injury, ATN--resolved  Baseline chronic kidney disease stage 3b  Type 2 diabetes, poorly controlled (A1c 10.8%)  Hypertension   Metabolic encephalopathy--improved  Anemia of CKD  Hypokalemia--improved  Sepsis due to infection of left foot wound  Hypercalcemia--improving    Plan:  Renal function remains stable  Monitor labs  When discharged, will need follow up in our office in 1-2 weeks      Stewart Alvarez, APRN  4/10/2024  11:14 CDT

## 2024-04-10 NOTE — PROGRESS NOTES
"INFECTIOUS DISEASES PROGRESS NOTE    Patient:  Erick Luong  YOB: 1955  MRN: [id number]   Admit date: 3/26/2024   Admitting Physician: Rene Maloney MD  Primary Care Physician: Del Shetty MD    Chief Complaint: Nausea.    Interval History: Patient had some nausea today but reportedly ate most of his lunch.  He said he did have some emesis yesterday.  He says he occasionally does that at home and is told his stomach does not empty well.    Allergies:   Allergies   Allergen Reactions    Cefepime Hives and Anaphylaxis    Bactrim [Sulfamethoxazole-Trimethoprim] Other (See Comments)     \"RENAL FAILURE\"    Vancomycin Itching    Zolpidem Unknown - High Severity     \"makes him crazy\"    Zolpidem Tartrate Unknown - Low Severity and Provider Review Needed    Metronidazole Rash       Current Scheduled Medications:   ascorbic acid, 1,000 mg, Oral, Daily  bumetanide, 2 mg, Oral, Daily  carvedilol, 3.125 mg, Oral, BID With Meals  donepezil, 10 mg, Oral, Daily  hydrocortisone, 25 mg, Rectal, BID  insulin detemir, 30 Units, Subcutaneous, Nightly  insulin lispro, 4-24 Units, Subcutaneous, 4x Daily AC & at Bedtime  insulin regular, 10 Units, Subcutaneous, TID AC  isosorbide mononitrate, 30 mg, Oral, Q24H  lactulose, 20 g, Oral, TID  linezolid, 600 mg, Oral, Q12H  melatonin, 6 mg, Oral, Nightly  midodrine, 2.5 mg, Oral, TID AC  mupirocin, 1 Application, Each Nare, BID  pantoprazole, 40 mg, Oral, BID AC  senna-docusate sodium, 1 tablet, Oral, BID   And  polyethylene glycol, 17 g, Oral, Daily  pregabalin, 100 mg, Oral, Nightly  pregabalin, 50 mg, Oral, Daily  rosuvastatin, 10 mg, Oral, Nightly  sodium chloride, 10 mL, Intravenous, Q12H  sodium chloride, 10 mL, Intravenous, Q12H  sucralfate, 1 g, Oral, TID AC  tamsulosin, 0.4 mg, Oral, Daily  timolol, 1 drop, Both Eyes, Q12H  zinc sulfate, 220 mg, Oral, Daily      Current PRN Medications:    acetaminophen **OR** acetaminophen **OR** acetaminophen    " "senna-docusate sodium **AND** polyethylene glycol **AND** bisacodyl **AND** bisacodyl    dextrose    dextrose    Diclofenac Sodium    dicyclomine    [Transfer Hold] glucagon (human recombinant)    magnesium hydroxide    nitroglycerin    ondansetron ODT **OR** ondansetron    oxyCODONE    sodium chloride    sodium chloride    sodium chloride    sodium chloride    sodium chloride    sodium chloride, 100 mL/hr           Objective     Vital Signs:  Temp (24hrs), Av.6 °F (36.4 °C), Min:97.4 °F (36.3 °C), Max:98.2 °F (36.8 °C)      /47 (BP Location: Right arm, Patient Position: Sitting)   Pulse 75   Temp 97.6 °F (36.4 °C)   Resp 16   Ht 182.9 cm (72\")   Wt 131 kg (289 lb 3.2 oz)   SpO2 93%   BMI 39.22 kg/m²         Physical Exam:    General: The patient is sitting up in the recliner in no acute distress.  Respiratory: Effort even and unlabored, is not conversationally dyspneic  Abdomen: Soft, no rebound or guarding  Left foot dressing clean dry and intact          esults Review:    I reviewed the patient's new clinical results.    Lab Results:    CBC:   Lab Results   Lab 24  0514 24  0512 24  0340 24  1344 24  0422 24  1429 24  0623 24  1218 24  0611 24  1237 04/10/24  0004 04/10/24  0552 04/10/24  1300   WBC 5.64 6.93 5.43  --  5.70  --  6.19  --  4.35  --   --  4.60  --    HEMOGLOBIN 10.0* 10.9* 9.5*   < > 9.2*   < > 8.7*   < > 9.2*   < > 8.0* 8.2* 8.5*   HEMATOCRIT 31.3* 34.5* 30.2*   < > 29.5*   < > 27.0*   < > 29.0*   < > 25.6* 26.3* 25.9*   PLATELETS 251 321 294  --  275  --  246  --  231  --   --  173  --     < > = values in this interval not displayed.        AutoDiff:   Lab Results   Lab 24  0623 24  0611 04/10/24  0552   NEUTROPHIL % 65.7 45.1 50.9   LYMPHOCYTE % 22.1 40.0 35.4   MONOCYTES % 8.7 9.9 9.1   EOSINOPHIL % 2.6 4.1 3.7   BASOPHIL % 0.6 0.7 0.7   NEUTROS ABS 4.06 1.96 2.34   LYMPHS ABS 1.37 1.74 1.63   MONOS ABS " 0.54 0.43 0.42   EOS ABS 0.16 0.18 0.17   BASOS ABS 0.04 0.03 0.03        Manual Diff:    Lab Results   Lab 04/08/24  0623 04/09/24  0611 04/10/24  0552   NEUTROS ABS 4.06 1.96 2.34           CMP:   Lab Results   Lab 04/05/24  0512 04/06/24  0340 04/08/24  0623 04/09/24  0610 04/10/24  0553   SODIUM 139  139   < > 141 141 141   POTASSIUM 4.4  4.4   < > 4.0 4.1 4.1   CHLORIDE 102  102   < > 104 104 105   CO2 25.0  25.0   < > 25.0 26.0 26.0   BUN 31*  31*   < > 30* 28* 27*   CREATININE 1.98*  1.98*   < > 1.61* 1.84* 1.85*   CALCIUM 11.1*  11.1*   < > 9.1 9.4 9.2   BILIRUBIN 0.4  --  0.4  --   --    ALK PHOS 138*  --  107  --   --    ALT (SGPT) 18  --  14  --   --    AST (SGOT) 22  --  16  --   --    GLUCOSE 173*  173*   < > 82 170* 124*    < > = values in this interval not displayed.       Estimated Creatinine Clearance: 54.3 mL/min (A) (by C-G formula based on SCr of 1.85 mg/dL (H)).    Culture Results:    Microbiology Results (last 10 days)       Procedure Component Value - Date/Time    Blood Culture With DARSHAN - Blood, Hand, Right [[id number]]  (Normal) Collected: 04/01/24 0402    Lab Status: Final result Specimen: Blood from Hand, Right Updated: 04/06/24 0445     Blood Culture No growth at 5 days               Radiology:       Imaging Results (Last 72 Hours)       ** No results found for the last 72 hours. **                Active Hospital Problems    Diagnosis     **Sepsis     Diabetic foot infection     Chronic kidney disease (CKD), stage IV (severe)     Chronic diastolic heart failure     BPH without obstruction/lower urinary tract symptoms     Diabetic polyneuropathy associated with type 2 diabetes mellitus     Anemia due to chronic kidney disease     Essential hypertension     Type 2 diabetes mellitus with hyperglycemia, with long-term current use of insulin        IMPRESSION:    MRSA bacteremia-blood cultures were + March 23 and March 29.  Source was felt to be infected left foot with wounds and  associated cellulitis on admission.  Blood cultures negative as of March 30.  Today is day #12/14 of antibiotic therapy with linezolid since first negative blood culture  Chronic kidney disease stage IIIb with acute kidney injury-nephrology following.  Back to baseline  Type 2 diabetes mellitus-poorly controlled with hemoglobin A1c of 10.7 this admission  Bright red blood per rectum-colonoscopy April 7 with sigmoid bleeding that was clipped.  MRSA nasal screen positive-treated with intranasal mupirocin      RECOMMENDATION:   Continue linezolid p.o.  Hemoglobin monitoring per attending/GI-noted recommendations for fully prepped colonoscopy as outpatient.  Wound care per Dr. Cerrato's orders  Glucose management per attending  Fluid/diuretic management per nephrology.          Julia Adrian MD  04/10/24  13:33 CDT

## 2024-04-11 ENCOUNTER — ANESTHESIA EVENT (OUTPATIENT)
Dept: GASTROENTEROLOGY | Facility: HOSPITAL | Age: 69
End: 2024-04-11
Payer: COMMERCIAL

## 2024-04-11 ENCOUNTER — ANESTHESIA (OUTPATIENT)
Dept: GASTROENTEROLOGY | Facility: HOSPITAL | Age: 69
End: 2024-04-11
Payer: COMMERCIAL

## 2024-04-11 ENCOUNTER — READMISSION MANAGEMENT (OUTPATIENT)
Dept: CALL CENTER | Facility: HOSPITAL | Age: 69
End: 2024-04-11
Payer: COMMERCIAL

## 2024-04-11 VITALS
BODY MASS INDEX: 39.17 KG/M2 | WEIGHT: 289.2 LBS | TEMPERATURE: 97.4 F | DIASTOLIC BLOOD PRESSURE: 62 MMHG | SYSTOLIC BLOOD PRESSURE: 132 MMHG | RESPIRATION RATE: 14 BRPM | OXYGEN SATURATION: 99 % | HEART RATE: 62 BPM | HEIGHT: 72 IN

## 2024-04-11 PROBLEM — A41.02 SEPSIS DUE TO METHICILLIN RESISTANT STAPHYLOCOCCUS AUREUS (MRSA) WITH ENCEPHALOPATHY WITHOUT SEPTIC SHOCK: Status: ACTIVE | Noted: 2024-04-11

## 2024-04-11 PROBLEM — G93.41 SEPSIS DUE TO METHICILLIN RESISTANT STAPHYLOCOCCUS AUREUS (MRSA) WITH ENCEPHALOPATHY WITHOUT SEPTIC SHOCK: Status: ACTIVE | Noted: 2024-04-11

## 2024-04-11 PROBLEM — R65.20 SEPSIS DUE TO METHICILLIN RESISTANT STAPHYLOCOCCUS AUREUS (MRSA) WITH ENCEPHALOPATHY WITHOUT SEPTIC SHOCK: Status: ACTIVE | Noted: 2024-04-11

## 2024-04-11 PROBLEM — R10.13 EPIGASTRIC PAIN: Status: ACTIVE | Noted: 2024-03-26

## 2024-04-11 PROBLEM — I50.32 CHRONIC HEART FAILURE WITH PRESERVED EJECTION FRACTION (HFPEF): Status: ACTIVE | Noted: 2024-04-11

## 2024-04-11 LAB
ANION GAP SERPL CALCULATED.3IONS-SCNC: 10 MMOL/L (ref 5–15)
BASOPHILS # BLD AUTO: 0.03 10*3/MM3 (ref 0–0.2)
BASOPHILS NFR BLD AUTO: 0.5 % (ref 0–1.5)
BUN SERPL-MCNC: 31 MG/DL (ref 8–23)
BUN/CREAT SERPL: 15.3 (ref 7–25)
CALCIUM SPEC-SCNC: 9.1 MG/DL (ref 8.6–10.5)
CHLORIDE SERPL-SCNC: 104 MMOL/L (ref 98–107)
CO2 SERPL-SCNC: 27 MMOL/L (ref 22–29)
CREAT SERPL-MCNC: 2.02 MG/DL (ref 0.76–1.27)
DEPRECATED RDW RBC AUTO: 45.8 FL (ref 37–54)
EGFRCR SERPLBLD CKD-EPI 2021: 35.5 ML/MIN/1.73
EOSINOPHIL # BLD AUTO: 0.15 10*3/MM3 (ref 0–0.4)
EOSINOPHIL NFR BLD AUTO: 2.4 % (ref 0.3–6.2)
ERYTHROCYTE [DISTWIDTH] IN BLOOD BY AUTOMATED COUNT: 14 % (ref 12.3–15.4)
GLUCOSE BLDC GLUCOMTR-MCNC: 130 MG/DL (ref 70–130)
GLUCOSE BLDC GLUCOMTR-MCNC: 192 MG/DL (ref 70–130)
GLUCOSE SERPL-MCNC: 174 MG/DL (ref 65–99)
HCT VFR BLD AUTO: 26.5 % (ref 37.5–51)
HCT VFR BLD AUTO: 28.2 % (ref 37.5–51)
HGB BLD-MCNC: 8.4 G/DL (ref 13–17.7)
HGB BLD-MCNC: 8.9 G/DL (ref 13–17.7)
IMM GRANULOCYTES # BLD AUTO: 0.02 10*3/MM3 (ref 0–0.05)
IMM GRANULOCYTES NFR BLD AUTO: 0.3 % (ref 0–0.5)
LYMPHOCYTES # BLD AUTO: 1.69 10*3/MM3 (ref 0.7–3.1)
LYMPHOCYTES NFR BLD AUTO: 26.7 % (ref 19.6–45.3)
MCH RBC QN AUTO: 28.1 PG (ref 26.6–33)
MCHC RBC AUTO-ENTMCNC: 31.7 G/DL (ref 31.5–35.7)
MCV RBC AUTO: 88.6 FL (ref 79–97)
MONOCYTES # BLD AUTO: 0.45 10*3/MM3 (ref 0.1–0.9)
MONOCYTES NFR BLD AUTO: 7.1 % (ref 5–12)
NEUTROPHILS NFR BLD AUTO: 3.99 10*3/MM3 (ref 1.7–7)
NEUTROPHILS NFR BLD AUTO: 63 % (ref 42.7–76)
NRBC BLD AUTO-RTO: 0 /100 WBC (ref 0–0.2)
PLATELET # BLD AUTO: 145 10*3/MM3 (ref 140–450)
PMV BLD AUTO: 10 FL (ref 6–12)
POTASSIUM SERPL-SCNC: 4.2 MMOL/L (ref 3.5–5.2)
RBC # BLD AUTO: 2.99 10*6/MM3 (ref 4.14–5.8)
SODIUM SERPL-SCNC: 141 MMOL/L (ref 136–145)
WBC NRBC COR # BLD AUTO: 6.33 10*3/MM3 (ref 3.4–10.8)

## 2024-04-11 PROCEDURE — 85025 COMPLETE CBC W/AUTO DIFF WBC: CPT | Performed by: INTERNAL MEDICINE

## 2024-04-11 PROCEDURE — 88305 TISSUE EXAM BY PATHOLOGIST: CPT | Performed by: INTERNAL MEDICINE

## 2024-04-11 PROCEDURE — 97116 GAIT TRAINING THERAPY: CPT

## 2024-04-11 PROCEDURE — 85014 HEMATOCRIT: CPT | Performed by: HOSPITALIST

## 2024-04-11 PROCEDURE — 80048 BASIC METABOLIC PNL TOTAL CA: CPT | Performed by: INTERNAL MEDICINE

## 2024-04-11 PROCEDURE — 25010000002 PROPOFOL 10 MG/ML EMULSION: Performed by: NURSE ANESTHETIST, CERTIFIED REGISTERED

## 2024-04-11 PROCEDURE — 25810000003 SODIUM CHLORIDE 0.9 % SOLUTION: Performed by: NURSE ANESTHETIST, CERTIFIED REGISTERED

## 2024-04-11 PROCEDURE — 99231 SBSQ HOSP IP/OBS SF/LOW 25: CPT | Performed by: INTERNAL MEDICINE

## 2024-04-11 PROCEDURE — 97110 THERAPEUTIC EXERCISES: CPT

## 2024-04-11 PROCEDURE — 43239 EGD BIOPSY SINGLE/MULTIPLE: CPT | Performed by: INTERNAL MEDICINE

## 2024-04-11 PROCEDURE — 0DB68ZX EXCISION OF STOMACH, VIA NATURAL OR ARTIFICIAL OPENING ENDOSCOPIC, DIAGNOSTIC: ICD-10-PCS | Performed by: INTERNAL MEDICINE

## 2024-04-11 PROCEDURE — 63710000001 INSULIN REGULAR HUMAN PER 5 UNITS: Performed by: INTERNAL MEDICINE

## 2024-04-11 PROCEDURE — 85018 HEMOGLOBIN: CPT | Performed by: HOSPITALIST

## 2024-04-11 PROCEDURE — 63710000001 ONDANSETRON ODT 4 MG TABLET DISPERSIBLE: Performed by: INTERNAL MEDICINE

## 2024-04-11 PROCEDURE — 82948 REAGENT STRIP/BLOOD GLUCOSE: CPT

## 2024-04-11 RX ORDER — LANOLIN ALCOHOL/MO/W.PET/CERES
6 CREAM (GRAM) TOPICAL NIGHTLY PRN
Qty: 30 TABLET | Refills: 0 | Status: SHIPPED | OUTPATIENT
Start: 2024-04-11

## 2024-04-11 RX ORDER — BUMETANIDE 2 MG/1
2 TABLET ORAL DAILY
Qty: 90 TABLET | Refills: 0 | Status: SHIPPED | OUTPATIENT
Start: 2024-04-12

## 2024-04-11 RX ORDER — SODIUM CHLORIDE 0.9 % (FLUSH) 0.9 %
10 SYRINGE (ML) INJECTION AS NEEDED
Status: DISCONTINUED | OUTPATIENT
Start: 2024-04-11 | End: 2024-04-11 | Stop reason: HOSPADM

## 2024-04-11 RX ORDER — ONDANSETRON 4 MG/1
4 TABLET, ORALLY DISINTEGRATING ORAL EVERY 8 HOURS PRN
Qty: 21 TABLET | Refills: 0 | Status: SHIPPED | OUTPATIENT
Start: 2024-04-11

## 2024-04-11 RX ORDER — LINEZOLID 600 MG/1
600 TABLET, FILM COATED ORAL EVERY 12 HOURS SCHEDULED
Qty: 3 TABLET | Refills: 0 | Status: SHIPPED | OUTPATIENT
Start: 2024-04-11 | End: 2024-04-13

## 2024-04-11 RX ORDER — LIDOCAINE HYDROCHLORIDE 10 MG/ML
0.5 INJECTION, SOLUTION EPIDURAL; INFILTRATION; INTRACAUDAL; PERINEURAL ONCE AS NEEDED
Status: DISCONTINUED | OUTPATIENT
Start: 2024-04-11 | End: 2024-04-11 | Stop reason: HOSPADM

## 2024-04-11 RX ORDER — LIDOCAINE HYDROCHLORIDE 20 MG/ML
INJECTION, SOLUTION EPIDURAL; INFILTRATION; INTRACAUDAL; PERINEURAL AS NEEDED
Status: DISCONTINUED | OUTPATIENT
Start: 2024-04-11 | End: 2024-04-11 | Stop reason: SURG

## 2024-04-11 RX ORDER — PROPOFOL 10 MG/ML
VIAL (ML) INTRAVENOUS AS NEEDED
Status: DISCONTINUED | OUTPATIENT
Start: 2024-04-11 | End: 2024-04-11 | Stop reason: SURG

## 2024-04-11 RX ORDER — SODIUM CHLORIDE 9 MG/ML
500 INJECTION, SOLUTION INTRAVENOUS CONTINUOUS PRN
Status: DISCONTINUED | OUTPATIENT
Start: 2024-04-11 | End: 2024-04-11 | Stop reason: HOSPADM

## 2024-04-11 RX ORDER — INSULIN HUMAN 500 [IU]/ML
INJECTION, SOLUTION SUBCUTANEOUS
Start: 2024-04-11

## 2024-04-11 RX ADMIN — OXYCODONE HYDROCHLORIDE 10 MG: 5 TABLET ORAL at 03:23

## 2024-04-11 RX ADMIN — DONEPEZIL HYDROCHLORIDE 10 MG: 10 TABLET, FILM COATED ORAL at 13:37

## 2024-04-11 RX ADMIN — PROPOFOL INJECTABLE EMULSION 50 MG: 10 INJECTION, EMULSION INTRAVENOUS at 12:40

## 2024-04-11 RX ADMIN — LINEZOLID 600 MG: 600 TABLET, FILM COATED ORAL at 13:37

## 2024-04-11 RX ADMIN — PROPOFOL INJECTABLE EMULSION 50 MG: 10 INJECTION, EMULSION INTRAVENOUS at 12:37

## 2024-04-11 RX ADMIN — PROPOFOL INJECTABLE EMULSION 50 MG: 10 INJECTION, EMULSION INTRAVENOUS at 12:38

## 2024-04-11 RX ADMIN — DICYCLOMINE HYDROCHLORIDE 20 MG: 10 CAPSULE ORAL at 00:04

## 2024-04-11 RX ADMIN — SODIUM CHLORIDE 500 ML: 0.9 INJECTION, SOLUTION INTRAVENOUS at 12:20

## 2024-04-11 RX ADMIN — LIDOCAINE HYDROCHLORIDE 100 MG: 20 INJECTION, SOLUTION EPIDURAL; INFILTRATION; INTRACAUDAL; PERINEURAL at 12:36

## 2024-04-11 RX ADMIN — INSULIN HUMAN 10 UNITS: 100 INJECTION, SOLUTION PARENTERAL at 08:26

## 2024-04-11 RX ADMIN — ONDANSETRON 4 MG: 4 TABLET, ORALLY DISINTEGRATING ORAL at 00:04

## 2024-04-11 RX ADMIN — PROPOFOL INJECTABLE EMULSION 100 MG: 10 INJECTION, EMULSION INTRAVENOUS at 12:36

## 2024-04-11 RX ADMIN — PROPOFOL INJECTABLE EMULSION 50 MG: 10 INJECTION, EMULSION INTRAVENOUS at 12:42

## 2024-04-11 RX ADMIN — Medication 1 APPLICATION: at 13:38

## 2024-04-11 RX ADMIN — TAMSULOSIN HYDROCHLORIDE 0.4 MG: 0.4 CAPSULE ORAL at 13:38

## 2024-04-11 NOTE — CASE MANAGEMENT/SOCIAL WORK
Continued Stay Note  [facility]     Patient Name: Erick Luong  MRN: [id number]  Today's Date: 4/11/2024    Admit Date: 3/26/2024    Plan: Home   Discharge Plan       Row Name 04/11/24 1343       Plan    Plan Home    Patient/Family in Agreement with Plan yes    Plan Comments Plan is for pt to be d/c'ed home today. Will follow for possible home health.                   Discharge Codes    No documentation.                 Expected Discharge Date and Time       Expected Discharge Date Expected Discharge Time    Apr 11, 2024               MEJIA Carreno

## 2024-04-11 NOTE — THERAPY TREATMENT NOTE
Acute Care - Physical Therapy Treatment Note  [facility]     Patient Name: Erick Luong  : 1955  MRN: [id number]  Today's Date: 2024      Visit Dx:     ICD-10-CM ICD-9-CM   1. Diabetic foot infection  E11.628 250.80    L08.9 686.9   2. Poorly controlled diabetes mellitus  E11.65 250.00   3. Impaired functional mobility and activity tolerance [Z74.09]  Z74.09 V49.89   4. Rectal bleeding  K62.5 569.3   5. Anemia due to stage 3 chronic kidney disease, unspecified whether stage 3a or 3b CKD  N18.30 285.21    D63.1 585.3   6. GERD without esophagitis  K21.9 530.81   7. Epigastric pain  R10.13 789.06   8. Chronic kidney disease (CKD), stage IV (severe)  N18.4 585.4     Patient Active Problem List   Diagnosis    Obesity, unspecified obesity severity, unspecified obesity type    Essential hypertension    Type 2 diabetes mellitus with hyperglycemia, with long-term current use of insulin    Nonsmoker    Anemia due to chronic kidney disease    Class 3 severe obesity due to excess calories with body mass index (BMI) of 40.0 to 44.9 in adult    Anasarca    Sleep apnea with use of continuous positive airway pressure (CPAP)    Medically noncompliant    Diabetic ulcer of left midfoot associated with type 2 diabetes mellitus, with fat layer exposed    Diabetic polyneuropathy associated with type 2 diabetes mellitus    Spinal stenosis in cervical region    Degeneration of cervical intervertebral disc    Cervical radiculopathy    Degeneration of lumbar or lumbosacral intervertebral disc    Cervical myelopathy    Bilateral carpal tunnel syndrome    CAD (coronary artery disease)    GERD without esophagitis    BPH without obstruction/lower urinary tract symptoms    Stage 3b chronic kidney disease    Chronic diastolic heart failure    Type 2 myocardial infarction due to heart failure    Left carpal tunnel syndrome    Syncope and collapse, recurrent episodes    Poorly-controlled hypertension    Rhinovirus    Peripheral  vascular disease    Chronic kidney disease (CKD), stage IV (severe)    Diabetic foot infection    Sepsis    Epigastric pain    Chronic heart failure with preserved ejection fraction (HFpEF)    Sepsis due to methicillin resistant Staphylococcus aureus (MRSA) with encephalopathy without septic shock     Past Medical History:   Diagnosis Date    Arthritis     Autonomic disease     CAD (coronary artery disease) 02/06/2017    Cervical radiculopathy 09/16/2021    Chronic constipation with acute exaccerbation 05/10/2021    Coronary artery disease     Degeneration of cervical intervertebral disc 08/11/2021    Diabetes mellitus     Diabetic foot ulcer 08/31/2020    Diabetic polyneuropathy associated with type 2 diabetes mellitus 01/18/2021    Elevated cholesterol     Gastroesophageal reflux disease 05/13/2019    Headache     HTN (hypertension), benign 05/03/2017    Hyperlipidemia     Hypertension     Mixed hyperlipidemia 02/07/2017    Multiple lung nodules 01/26/2020    5mm, 9 mm RLL identified 1/2020, not present 10/2019.    Myocardial infarction     Osteomyelitis 01/22/2020    Osteomyelitis of fifth toe of right foot 10/07/2019    Pancreatitis     Persistent insomnia 01/20/2020    Renal disorder     Sleep apnea 02/06/2017    Sleep apnea with use of continuous positive airway pressure (CPAP)     NON-COMPLIANT    Slow transit constipation 01/16/2019    Spinal stenosis in cervical region 09/16/2021    Vitamin D deficiency 03/02/2021     Past Surgical History:   Procedure Laterality Date    ABDOMINAL SURGERY      AMPUTATION FOOT / TOE Left 10/2021    5th digit     ANTERIOR CERVICAL DISCECTOMY W/ FUSION N/A 8/5/2022    Procedure: CERVICAL DISCECTOMY ANTERIOR WITH FUSION C5-6 with possible plating of C5-7 with neuromonitoring and 1 c-arm;  Surgeon: Karel Soliz MD;  Location: [facility];  Service: Neurosurgery;  Laterality: N/A;    APPENDECTOMY      BACK SURGERY      CARDIAC CATHETERIZATION Left 02/08/2021    Procedure: Left  Heart Cath w poss intervention left anatomical snuff box acess;  Surgeon: Omkar Charles DO;  Location:  [facility];  Service: Cardiology;  Laterality: Left;    CARDIAC SURGERY      CATARACT EXTRACTION      CERVICAL SPINE SURGERY      COLONOSCOPY N/A 01/31/2017    Normal exam repeat in 5 years    COLONOSCOPY N/A 02/11/2019    Mild acute inflammation    COLONOSCOPY N/A 4/7/2024    Procedure: COLONOSCOPY WITH ANESTHESIA;  Surgeon: Raymond Mederos MD;  Location:  [facility] OR;  Service: Gastroenterology;  Laterality: N/A;  Pre: Anemia, Rectal bleed;  Post: Visible vessel at 20cm, clip x6 placed;  Del Shetty MD    COLONOSCOPY W/ POLYPECTOMY  03/04/2014    Hyperplastic polyp    CORONARY ARTERY BYPASS GRAFT  10/2015    ENDOSCOPY  04/13/2011    Gastritis with hemorrhage    ENDOSCOPY N/A 05/05/2017    Normal exam    ENDOSCOPY N/A 02/11/2019    Gastritis    ENDOSCOPY N/A 09/01/2020    Non-erosive gastritis with hemorrhage    ENDOSCOPY N/A 02/10/2021    Esophagitis    FOOT SURGERY Left     INCISION AND DRAINAGE OF WOUND Left 09/2007    spider bite     PT Assessment (Last 12 Hours)       PT Evaluation and Treatment       Row Name 04/11/24 1445 04/11/24 1321       Physical Therapy Time and Intention    Subjective Information complains of;weakness;fatigue;pain  -LY --    Document Type therapy note (daily note)  -LY --    Mode of Treatment physical therapy  -LY physical therapy  -LY    Session Not Performed -- patient unavailable for treatment  -LY    Comment, Session Not Performed -- endo  -LY      Row Name 04/11/24 1445          General Information    Existing Precautions/Restrictions fall  LLE WBAT in CAM boot  -LY       Row Name 04/11/24 1445          Pain    Pretreatment Pain Rating 6/10  -LY     Posttreatment Pain Rating 6/10  -LY     Pain Location - abdomen  -LY     Pain Intervention(s) Medication (See MAR);Ambulation/increased activity  -LY       Row Name 04/11/24 1445          Bed  Mobility    Supine-Sit [place] (Bed Mobility) standby assist;verbal cues  -LY     Bed Mobility, Safety Issues decreased use of legs for bridging/pushing  -LY     Assistive Device (Bed Mobility) bed rails;head of bed elevated  -LY       Row Name 04/11/24 1445          Sit-Stand Transfer    Sit-Stand [place] (Transfers) standby assist  -LY     Assistive Device (Sit-Stand Transfers) walker, front-wheeled  -LY       Row Name 04/11/24 1445          Stand-Sit Transfer    Stand-Sit [place] (Transfers) standby assist  -LY     Assistive Device (Stand-Sit Transfers) walker, front-wheeled  -LY       Row Name 04/11/24 1445          Gait/Stairs (Locomotion)    [place] Level (Gait) verbal cues;contact guard  -LY     Assistive Device (Gait) walker, front-wheeled  -LY     Distance in Feet (Gait) 100  x2 reps  -LY     Pattern (Gait) step-through  -LY     Deviations/Abnormal Patterns (Gait) gait speed decreased;stride length decreased  -LY     Bilateral Gait Deviations forward flexed posture  -LY     Comment, (Gait/Stairs) cues for RWX management and increased L step height  -LY       Row Name 04/11/24 1445          Aerobic Exercise    Comment, Aerobic Exercise (Therapeutic Exercise) BLE AROM x15 reps seated  -LY       Row Name             Wound 06/15/23 0102 Left anterior plantar    Wound - Properties Group Placement Date: 06/15/23  -SM Placement Time: 0102 -SM Side: Left  -SM Orientation: anterior  -SM Location: plantar  -SM    Retired Wound - Properties Group Placement Date: 06/15/23  -SM Placement Time: 0102  -SM Side: Left  -SM Orientation: anterior  -SM Location: plantar  -SM    Retired Wound - Properties Group Date first assessed: 06/15/23  -SM Time first assessed: 0102  -SM Side: Left  -SM Location: plantar  -SM      Row Name             Wound 08/22/23 0140 Left posterior plantar    Wound - Properties Group Placement Date: 08/22/23  -AM Placement Time: 0140  -AM Present on Original Admission: Y  -AM Side:  Left  -AM Orientation: posterior  -AM Location: plantar  -AM    Retired Wound - Properties Group Placement Date: 08/22/23  -AM Placement Time: 0140  -AM Present on Original Admission: Y  -AM Side: Left  -AM Orientation: posterior  -AM Location: plantar  -AM    Retired Wound - Properties Group Date first assessed: 08/22/23  -AM Time first assessed: 0140  -AM Present on Original Admission: Y  -AM Side: Left  -AM Location: plantar  -AM      Row Name             Wound Left lateral foot Diabetic Ulcer    Wound - Properties Group Side: Left  -MH Orientation: lateral  -MH Location: foot  -JACIEL, 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;diabetic foot ulcer/gangrene  Primary Wound Type: Diabetic ulc  -JACIEL Wound Outcome: Amputation  -JACIEL Stage, Pressure Injury : other (see comments)  -JACIEL, full thickness starting 10/20/21     Retired Wound - Properties Group Side: Left  -MH Orientation: lateral  -MH Location: foot  -JACIEL, 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;diabetic foot ulcer/gangrene  Primary Wound Type: Diabetic ulc  -JACIEL Stage, Pressure Injury : other (see comments)  -JACIEL, full thickness starting 10/20/21  Wound Outcome: Amputation  -JACIEL    Retired Wound - Properties Group Side: Left  -MH Location: foot  -JACIEL, 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;diabetic foot ulcer/gangrene  Primary Wound Type: Diabetic ulc  -JACIEL Wound Outcome: Amputation  -JACIEL      Row Name             Wound 04/04/24 2100 Left posterior wrist Skin Tear    Wound - Properties Group Placement Date: 04/04/24  -NR Placement Time: 2100  -NR Present on Original Admission: N  -NR Side: Left  -NR Orientation: posterior  -NR Location: wrist  -NR Primary Wound Type: Skin tear  -NR Additional Comments: likely caused by wristband. Band removed, site cleaned with alcohol swab, wraped in gauze  -NR    Retired Wound - Properties Group Placement Date: 04/04/24  -NR Placement Time: 2100  -NR Present on Original Admission: N  -NR Side: Left  -NR Orientation: posterior  -NR Location: wrist  -NR Primary Wound Type: Skin tear   -NR Additional Comments: likely caused by wristband. Band removed, site cleaned with alcohol swab, wraped in gauze  -NR    Retired Wound - Properties Group Date first assessed: 04/04/24  -NR Time first assessed: 2100 -NR Present on Original Admission: N  -NR Side: Left  -NR Location: wrist  -NR Primary Wound Type: Skin tear  -NR Additional Comments: likely caused by wristband. Band removed, site cleaned with alcohol swab, wraped in gauze  -NR      Row Name 04/11/24 1445          Positioning and Restraints    Pre-Treatment Position in bed  -LY     Post Treatment Position chair  -LY     In Chair reclined;call light within reach;encouraged to call for assist;exit alarm on  -LY               User Key  (r) = Recorded By, (t) = Taken By, (c) = Cosigned By      Initials Name Provider Type    Yuliana Lisa, RN Registered Nurse    MH Haase, Mallory L, RN Registered Nurse    Alesha Armenta, PTA Physical Therapist Assistant    Faviola Kunz RN Registered Nurse    Michelle Diaz RN Registered Nurse    Reuben Johnson RN Registered Nurse                    Physical Therapy Education       Title: PT OT SLP Therapies (In Progress)       Topic: Physical Therapy (In Progress)       Point: Mobility training (Done)       Learning Progress Summary             Patient Acceptance, E, VU by YIMI at 4/9/2024 0957    Comment: Pt educated on continued POC, plans for d/c, and informed of progress made to date.    Acceptance, E,TB,D, VU,NR by NIKO at 3/29/2024 1009    Comment: education re: purpose of PT/importance of activity, safety/falls prevention, NWB L LE, improved tech w/ bed mobility, positioning for pressure relief                         Point: Home exercise program (Not Started)       Learner Progress:  Not documented in this visit.              Point: Precautions (Done)       Learning Progress Summary             Patient Acceptance, E, VU by YIMI at 4/9/2024 0957    Comment: Pt educated on continued  POC, plans for d/c, and informed of progress made to date.    Acceptance, E,TB,D, VU,NR by  at 3/29/2024 1009    Comment: education re: purpose of PT/importance of activity, safety/falls prevention, NWB L LE, improved tech w/ bed mobility, positioning for pressure relief                                         User Key       Initials Effective Dates Name Provider Type Discipline     08/02/18 -  Melly Mustafa, PT Physical Therapist PT     02/12/24 -  Kevin Nogueira, PT Student PT Student PT                  PT Recommendation and Plan     Plan of Care Reviewed With: patient  Progress: improving  Outcome Evaluation: PT tx completed. Pt in bed, c/o nausea. Nsg already given meds. Agreed to ambulate. SBA for supine to sit. Able to stand with CGA, cues for safety. Amb 120' with RWX and CGA. Occasional cues for RWX placement. Mod x1 for sit to supine d/t assist with BLEs. Will cont to follow.       Time Calculation:    PT Charges       Row Name 04/11/24 1606             Time Calculation    Start Time 1445  -LY      Stop Time 1524  -LY      Time Calculation (min) 39 min  -LY      PT Received On 04/11/24  -LY         Time Calculation- PT    Total Timed Code Minutes- PT 39 minute(s)  -LY         Timed Charges    26080 - PT Therapeutic Exercise Minutes 15  -LY      60072 - Gait Training Minutes  24  -LY         Total Minutes    Timed Charges Total Minutes 39  -LY       Total Minutes 39  -LY                User Key  (r) = Recorded By, (t) = Taken By, (c) = Cosigned By      Initials Name Provider Type    LY Alesha Dominguez PTA Physical Therapist Assistant                  Therapy Charges for Today       Code Description Service Date Service Provider Modifiers Qty    43113271487 HC GAIT TRAINING EA 15 MIN 4/10/2024 Alesha Dominguez, PTA GP 1    45150128836 HC PT THER PROC EA 15 MIN 4/11/2024 Alesha Dominguez, PTA GP 1    38179689326 HC GAIT TRAINING EA 15 MIN 4/11/2024 Alesha Dominguez, PARUL GP 2            PT G-Codes  Outcome  Measure Options: AM-PAC 6 Clicks Daily Activity (OT)  AM-PAC 6 Clicks Score (PT): 20  AM-PAC 6 Clicks Score (OT): 16    Alesha Dominguez, PTA  4/11/2024

## 2024-04-11 NOTE — PLAN OF CARE
Goal Outcome Evaluation:  Plan of Care Reviewed With: patient, spouse        Progress: no change  Outcome Evaluation: A&OX4, VSS. EGD today, barretts espohagus noted, x2 biopsy taken. Diet re-ordered, tolerating well. C/o ABD pain, pain med given as ordered. On room air, assist x1, voiding. LLE foot wound, dressing changed. MRSA contact Isolation maintained. No c/o nausea at this time. ACHS accuchecks with SSI. Wife at bedside. Alarms set, call light in reach. Safety maintained.

## 2024-04-11 NOTE — PROGRESS NOTES
Nephrology ([facility]) Progress Note      Patient:  Erick Luong  YOB: 1955  Date of Service: 4/11/2024  MRN: [id number]   Acct: [id number]   Primary Care Physician: Del Shetty MD  Advance Directive:   Code Status and Medical Interventions:   Ordered at: 03/26/24 1815     Level Of Support Discussed With:    Health Care Surrogate     Code Status (Patient has no pulse and is not breathing):    CPR (Attempt to Resuscitate)     Medical Interventions (Patient has pulse or is breathing):    Full Support     Admit Date: 3/26/2024       Hospital Day: 16  Referring Provider: No ref. provider found      Patient personally seen and examined.  Complete chart including Consults, Notes, Operative Reports, Labs, Cardiology, and Radiology studies reviewed as able.        Subjective:  Erick Luong is a 68 y.o. male for whom we were consulted for evaluation and treatment of acute kidney injury.  He has stage IIIb chronic kidney disease baseline, follows with Dr Lomas in our office.  Baseline creatinine approximately 1.8. He has history of insulin-dependent type 2 diabetes, hypertension, abdominal obesity, obstructive sleep apnea, diabetic foot ulcer and coronary artery disease.   Patient presented to ER on 3/26 with altered mental status. Had debridement of ongoing wound on left foot the day prior. Left foot warm and erythremic on arrival to ER. Noted to have elevated blood glucose over 400. Initial creatinine of 1.9.  Admitted to medical floor for sepsis due to left foot wound. Patient has been agitated and confused during the admission, requiring wrist restraints due to pulling at lines and tubes. Renal function and mentation have been improving. Now has a sitter present in room due to repeatedly getting up unattended. He is s/p colonoscopy on 4/07 due to rectal bleding.     Today is awake and alert. No new complaints. Did have some abdominal pain overnight.  Scheduled for EGD today.  Urine output nonoliguric     Allergies:  Cefepime, Bactrim [sulfamethoxazole-trimethoprim], Vancomycin, Zolpidem, Zolpidem tartrate, and Metronidazole    Home Meds:  Medications Prior to Admission   Medication Sig Dispense Refill Last Dose    amitriptyline (ELAVIL) 25 MG tablet Take 2 tablets by mouth Daily at bedtime. (Patient not taking: Reported on 3/27/2024) 60 tablet 1 Not Taking    apixaban (ELIQUIS) 5 MG tablet tablet Take 1 tablet by mouth Every 12 (Twelve) Hours.       ascorbic acid (VITAMIN C) 1000 MG tablet Take 1 tablet by mouth Daily. 30 tablet 3     Aspirin 81 MG capsule Take 81 mg by mouth Daily.       bumetanide (BUMEX) 1 MG tablet Take 1 tablet every day by oral route for 30 days. 30 tablet 0     bumetanide (BUMEX) 2 MG tablet Take 1 tablet by mouth Daily. Take 2 tablets in morning;1 tablet at night (Patient not taking: Reported on 3/27/2024)   Not Taking    busPIRone (BUSPAR) 10 MG tablet Take 1 tablet by mouth 3 (Three) Times a Day.       calcitriol (ROCALTROL) 0.5 MCG capsule Take 1 capsule by mouth Daily. 90 capsule 4     calcitriol (ROCALTROL) 0.5 MCG capsule Take 1 capsule every day by oral route for 90 days. (Patient not taking: Reported on 3/27/2024) 90 capsule 4 Not Taking    carvedilol (COREG) 3.125 MG tablet Take 1 tablet twice a day by oral route. 60 tablet 4     carvedilol (COREG) 6.25 MG tablet Take 1 tablet by mouth 2 (Two) Times a Day. (Patient not taking: Reported on 3/27/2024) 180 tablet 4 Not Taking    Cholecalciferol (D-5000) 125 MCG (5000 UT) tablet Take 1 tablet by mouth Daily Before Lunch. 30 tablet 2     cloNIDine (CATAPRES) 0.1 MG tablet Take 1 tablet by mouth Every 12 (Twelve) Hours. (Patient not taking: Reported on 3/27/2024) 60 tablet 2 Not Taking    Diclofenac Sodium (VOLTAREN) 1 % gel gel Apply 2 g topically to the appropriate area as directed 4 (Four) Times a Day As Needed. 300 g 11     Diclofenac Sodium (VOLTAREN) 1 % gel gel Apply 2 g topically to the appropriate  area as directed 4 (Four) Times a Day. (Patient not taking: Reported on 3/27/2024) 300 g 11 Not Taking    donepezil (ARICEPT) 10 MG tablet Take 1 tablet by mouth Daily. (Patient not taking: Reported on 3/27/2024) 90 tablet 4 Not Taking    Dulaglutide (Trulicity) 4.5 MG/0.5ML solution pen-injector Inject 0.5 mL under the skin into the appropriate area as directed 1 (One) Time Per Week. (Patient not taking: Reported on 3/27/2024) 2 mL 11 Not Taking    DULoxetine (CYMBALTA) 60 MG capsule Take 1 capsule by mouth Daily. 90 capsule 4     DULoxetine (CYMBALTA) 60 MG capsule Take 1 capsule by mouth Daily. (Patient not taking: Reported on 3/27/2024) 90 capsule 4 Not Taking    DULoxetine (CYMBALTA) 60 MG capsule Take 1 capsule by mouth Daily. (Patient not taking: Reported on 3/27/2024) 90 capsule 4 Not Taking    empagliflozin (JARDIANCE) 25 MG tablet tablet Take 1 tablet by mouth Daily. (Patient not taking: Reported on 3/27/2024) 30 tablet 2 Not Taking    famotidine (PEPCID) 20 MG tablet Take 1 tablet twice a day by oral route. 180 tablet 4     fluticasone (FLONASE) 50 MCG/ACT nasal spray 1 spray into the nostril(s) as directed by provider Daily. (Patient taking differently: 1 spray into the nostril(s) as directed by provider 2 (Two) Times a Day.) 16 g 1     HYDROcodone-acetaminophen (NORCO) 7.5-325 MG per tablet Take 1 tablet by mouth 2 (Two) Times a Day As Needed. (Patient not taking: Reported on 3/27/2024) 40 tablet 0 Not Taking    Insulin Glargine (Lantus SoloStar) 100 UNIT/ML injection pen Inject 20 Units under the skin into the appropriate area as directed Every Evening. 15 mL 3     Insulin Regular Human, Conc, (HumuLIN R U-500 KwikPen) 500 UNIT/ML solution pen-injector CONCENTRATED injection Inject 120 Units under the skin into the appropriate area as directed 2 (Two) Times a Day with breakfast and dinner. (Patient not taking: Reported on 3/27/2024) 18 mL 5 Not Taking    Insulin Regular Human, Conc, (HumuLIN R U-500  KwikPen) 500 UNIT/ML solution pen-injector CONCENTRATED injection Inject 40 Units under the skin into the appropriate area with regular meals AND 40 Units with large meals. 54 mL 3     isosorbide mononitrate (IMDUR) 30 MG 24 hr tablet Take 1 tablet by mouth Daily.       [] magnesium hydroxide (Milk of Magnesia) 400 MG/5ML suspension Take 30 mL every day by oral route for 30 days. 900 mL 0     magnesium hydroxide (Milk of Magnesia) 400 MG/5ML suspension Take 30mL by mouth once daily for 30 days. (Patient not taking: Reported on 3/27/2024) 900 mL 0 Not Taking    melatonin 3 MG tablet Take 1 tablet by mouth At Night As Needed for Sleep.       methylcellulose (Citrucel) oral powder Mix 5g as directed and drink twice daily for 90 days. 900 g 10     metoclopramide (REGLAN) 5 MG tablet Take 1 tablet by mouth 3 times a day.       midodrine (PROAMATINE) 2.5 MG tablet Take 1 tablet by mouth 3 (Three) Times a Day Before Meals.       nitroglycerin (NITROSTAT) 0.4 MG SL tablet Dissolve 1 tablet by mouth every 5 minutes as needed for chest pain; MAX 3 tabs/24 hours.  If no improvement after 3 tabs go to ER 25 tablet 0     ondansetron (ZOFRAN) 4 MG tablet Take 1 tablet by mouth Every 6 (Six) Hours As Needed for Nausea or Vomiting.       oxyCODONE (ROXICODONE) 10 MG tablet Take 1 tablet by mouth twice a day as needed 60 tablet 0     pantoprazole (Protonix) 40 MG EC tablet Take 1 tablet by mouth 2 (Two) Times a Day. (Patient not taking: Reported on 3/27/2024) 180 tablet 4 Not Taking    polyethylene glycol (MIRALAX) 17 g packet Take 17 g by mouth Daily. Obtain OTC       prazosin (MINIPRESS) 1 MG capsule Take 1 capsule by mouth every night at bedtime. 30 capsule 2     pregabalin (LYRICA) 100 MG capsule Take 2 capsules by mouth Every Night.       pregabalin (LYRICA) 50 MG capsule Take 1 capsule by mouth Daily.       rosuvastatin (CRESTOR) 10 MG tablet Take 1 tablet by mouth Daily.       sennosides-docusate (PERICOLACE) 8.6-50 MG  per tablet Take 1 tablet by mouth Every Night. Obtain OTC       sennosides-docusate (PERICOLACE) 8.6-50 MG per tablet Take 1 tablet by mouth every night at bedtime. (Patient not taking: Reported on 3/27/2024) 30 tablet 2 Not Taking    sodium hypochlorite (DAKIN'S 1/4 STRENGTH) 0.125 % solution topical solution 0.125% Apply to affected area twice daily (Patient not taking: Reported on 3/27/2024) 473 mL 3 Not Taking    sodium hypochlorite (DAKIN'S 1/4 STRENGTH) 0.125 % solution topical solution 0.125% Apply to affected area twice daily as directed (Patient not taking: Reported on 3/27/2024) 473 mL 5 Not Taking    sodium hypochlorite (DAKIN'S) 0.25 % topical solution Use to wound daily as instructed 473 mL 1     sucralfate (CARAFATE) 1 g tablet Take 1 tablet by mouth 3 times a day.       tamsulosin (FLOMAX) 0.4 MG capsule 24 hr capsule Take 1 capsule by mouth Daily. 90 capsule 1     timolol (TIMOPTIC) 0.5 % ophthalmic solution Administer 1 drop to both eyes 2 (Two) Times a Day.       valsartan (DIOVAN) 40 MG tablet Take 1 tablet by mouth Daily.       zinc sulfate (ZINCATE) 50 MG capsule Take 1 capsule by mouth Daily.          Medicines:  Current Facility-Administered Medications   Medication Dose Route Frequency Provider Last Rate Last Admin    acetaminophen (TYLENOL) tablet 650 mg  650 mg Oral Q4H PRN Raymond Mederos MD   650 mg at 04/10/24 2141    Or    acetaminophen (TYLENOL) 160 MG/5ML oral solution 650 mg  650 mg Oral Q4H PRN Raymond Mederos MD   650 mg at 03/27/24 2109    Or    acetaminophen (TYLENOL) suppository 650 mg  650 mg Rectal Q4H PRN Raymond Mederos MD        ascorbic acid (VITAMIN C) tablet 1,000 mg  1,000 mg Oral Daily Raymond Mederos MD   1,000 mg at 04/10/24 0828    sennosides-docusate (PERICOLACE) 8.6-50 MG per tablet 1 tablet  1 tablet Oral BID Raymond Mederos MD   1 tablet at 04/09/24 2107    And    polyethylene glycol (MIRALAX) packet 17 g  17 g Oral Daily Raymond Mederos MD   17 g at 04/06/24  0932    And    bisacodyl (DULCOLAX) EC tablet 5 mg  5 mg Oral Daily PRN Raymond Mederos MD        And    bisacodyl (DULCOLAX) suppository 10 mg  10 mg Rectal Daily PRN Raymond Mederos MD        bumetanide (BUMEX) tablet 2 mg  2 mg Oral Daily Vinny Hartley MD   2 mg at 04/10/24 0827    carvedilol (COREG) tablet 3.125 mg  3.125 mg Oral BID With Meals Rene Maloney MD   3.125 mg at 04/10/24 1854    dextrose (D50W) (25 g/50 mL) IV injection 25 g  25 g Intravenous Q15 Min PRN Raymond Mederos MD        dextrose (GLUTOSE) oral gel 15 g  15 g Oral Q15 Min PRN Raymond Mederos MD   15 g at 03/27/24 1710    Diclofenac Sodium (VOLTAREN) 1 % gel 2 g  2 g Topical 4x Daily PRN Raymond Mederos MD        dicyclomine (BENTYL) capsule 20 mg  20 mg Oral TID PRN Raymond Mederos MD   20 mg at 04/11/24 0004    donepezil (ARICEPT) tablet 10 mg  10 mg Oral Daily Raymond Mederos MD   10 mg at 04/10/24 0827    [Transfer Hold] glucagon (GLUCAGEN) injection 1 mg  1 mg Intramuscular Q15 Min PRN Raquel Duncan APRN        hydrocortisone (ANUSOL-HC) suppository 25 mg  25 mg Rectal BID Raymond Mederos MD   25 mg at 04/09/24 0828    insulin detemir (LEVEMIR) injection 30 Units  30 Units Subcutaneous Nightly Raymond Mederos MD   15 Units at 04/09/24 2117    Insulin Lispro (humaLOG) injection 4-24 Units  4-24 Units Subcutaneous 4x Daily AC & at Bedtime Raymond Mederos MD   4 Units at 04/10/24 2136    insulin regular (humuLIN R,novoLIN R) injection 10 Units  10 Units Subcutaneous TID AC Raymond Mederos MD   10 Units at 04/11/24 0826    isosorbide mononitrate (IMDUR) 24 hr tablet 30 mg  30 mg Oral Q24H Raymond Mederos MD   30 mg at 04/10/24 0829    lactulose solution 20 g  20 g Oral TID Raymond Mederos MD   20 g at 04/09/24 2107    linezolid (ZYVOX) tablet 600 mg  600 mg Oral Q12H Raymond Mederos MD   600 mg at 04/10/24 2137    magnesium hydroxide (MILK OF MAGNESIA) suspension 10 mL  10 mL Oral Daily PRN Raymond Mederos MD   10 mL  at 04/03/24 0414    melatonin tablet 6 mg  6 mg Oral Nightly Raymond Mederos MD   6 mg at 04/10/24 2137    midodrine (PROAMATINE) tablet 2.5 mg  2.5 mg Oral TID AC Raymond Mederos MD   2.5 mg at 04/10/24 0828    mupirocin (BACTROBAN) 2 % nasal ointment 1 Application  1 Application Each Nare BID Raymond Mederos MD   1 Application at 04/10/24 2136    nitroglycerin (NITROSTAT) SL tablet 0.4 mg  0.4 mg Sublingual Q5 Min PRN Raymond Mederos MD        ondansetron ODT (ZOFRAN-ODT) disintegrating tablet 4 mg  4 mg Oral Q6H PRN Raymond Mederos MD   4 mg at 04/11/24 0004    Or    ondansetron (ZOFRAN) injection 4 mg  4 mg Intravenous Q6H PRN Raymond Mederos MD   4 mg at 03/29/24 1837    oxyCODONE (ROXICODONE) immediate release tablet 10 mg  10 mg Oral BID PRN Raymond Mederos MD   10 mg at 04/11/24 0323    pantoprazole (PROTONIX) EC tablet 40 mg  40 mg Oral BID  Rene Maloney MD   40 mg at 04/10/24 1654    pregabalin (LYRICA) capsule 100 mg  100 mg Oral Nightly Rene Maloney MD   100 mg at 04/10/24 2137    pregabalin (LYRICA) capsule 50 mg  50 mg Oral Daily Rene Maloney MD   50 mg at 04/10/24 0828    rosuvastatin (CRESTOR) tablet 10 mg  10 mg Oral Nightly Raymond Mederos MD   10 mg at 04/10/24 2137    sodium chloride 0.9 % flush 10 mL  10 mL Intravenous PRN Raymond Mederos MD        sodium chloride 0.9 % flush 10 mL  10 mL Intravenous Q12H Raymond Mederos MD   10 mL at 04/07/24 2050    sodium chloride 0.9 % flush 10 mL  10 mL Intravenous PRN Raymond Mederos MD        sodium chloride 0.9 % flush 10 mL  10 mL Intravenous Q12H Tj Hardwick CRNA        sodium chloride 0.9 % flush 10 mL  10 mL Intravenous PRN Tj Hardwick CRNA        sodium chloride 0.9 % infusion 40 mL  40 mL Intravenous PRN Raymond Mederos MD        sodium chloride 0.9 % infusion 40 mL  40 mL Intravenous PRN Tj Hardwick CRNA        sodium chloride 0.9 % infusion  100 mL/hr Intravenous Continuous Tj Hardwick CRNA        sucralfate  (CARAFATE) 1 GM/10ML suspension 1 g  1 g Oral TID AC Raymond Mederos MD   1 g at 04/10/24 1654    tamsulosin (FLOMAX) 24 hr capsule 0.4 mg  0.4 mg Oral Daily Raymond Mederos MD   0.4 mg at 04/10/24 0828    timolol (TIMOPTIC) 0.25 % ophthalmic solution 1 drop  1 drop Both Eyes Q12H Raymond Mederos MD   1 drop at 04/10/24 2138    zinc sulfate (ZINCATE) capsule 220 mg  220 mg Oral Daily Raymond Mederos MD   220 mg at 04/10/24 0827       Past Medical History:  Past Medical History:   Diagnosis Date    Arthritis     Autonomic disease     CAD (coronary artery disease) 02/06/2017    Cervical radiculopathy 09/16/2021    Chronic constipation with acute exaccerbation 05/10/2021    Coronary artery disease     Degeneration of cervical intervertebral disc 08/11/2021    Diabetes mellitus     Diabetic foot ulcer 08/31/2020    Diabetic polyneuropathy associated with type 2 diabetes mellitus 01/18/2021    Elevated cholesterol     Gastroesophageal reflux disease 05/13/2019    Headache     HTN (hypertension), benign 05/03/2017    Hyperlipidemia     Hypertension     Mixed hyperlipidemia 02/07/2017    Multiple lung nodules 01/26/2020    5mm, 9 mm RLL identified 1/2020, not present 10/2019.    Myocardial infarction     Osteomyelitis 01/22/2020    Osteomyelitis of fifth toe of right foot 10/07/2019    Pancreatitis     Persistent insomnia 01/20/2020    Renal disorder     Sleep apnea 02/06/2017    Sleep apnea with use of continuous positive airway pressure (CPAP)     NON-COMPLIANT    Slow transit constipation 01/16/2019    Spinal stenosis in cervical region 09/16/2021    Vitamin D deficiency 03/02/2021       Past Surgical History:  Past Surgical History:   Procedure Laterality Date    ABDOMINAL SURGERY      AMPUTATION FOOT / TOE Left 10/2021    5th digit     ANTERIOR CERVICAL DISCECTOMY W/ FUSION N/A 8/5/2022    Procedure: CERVICAL DISCECTOMY ANTERIOR WITH FUSION C5-6 with possible plating of C5-7 with neuromonitoring and 1 c-arm;  Surgeon:  Karel Soliz MD;  Location:  [facility] OR;  Service: Neurosurgery;  Laterality: N/A;    APPENDECTOMY      BACK SURGERY      CARDIAC CATHETERIZATION Left 2021    Procedure: Left Heart Cath w poss intervention left anatomical snuff box acess;  Surgeon: Omkar Charles DO;  Location:  [facility] CATH INVASIVE LOCATION;  Service: Cardiology;  Laterality: Left;    CARDIAC SURGERY      CATARACT EXTRACTION      CERVICAL SPINE SURGERY      COLONOSCOPY N/A 2017    Normal exam repeat in 5 years    COLONOSCOPY N/A 2019    Mild acute inflammation    COLONOSCOPY N/A 2024    Procedure: COLONOSCOPY WITH ANESTHESIA;  Surgeon: Raymond Mederos MD;  Location:  [facility] OR;  Service: Gastroenterology;  Laterality: N/A;  Pre: Anemia, Rectal bleed;  Post: Visible vessel at 20cm, clip x6 placed;  Del Shetty MD    COLONOSCOPY W/ POLYPECTOMY  2014    Hyperplastic polyp    CORONARY ARTERY BYPASS GRAFT  10/2015    ENDOSCOPY  2011    Gastritis with hemorrhage    ENDOSCOPY N/A 2017    Normal exam    ENDOSCOPY N/A 2019    Gastritis    ENDOSCOPY N/A 2020    Non-erosive gastritis with hemorrhage    ENDOSCOPY N/A 02/10/2021    Esophagitis    FOOT SURGERY Left     INCISION AND DRAINAGE OF WOUND Left 2007    spider bite       Family History  Family History   Problem Relation Age of Onset    Colon cancer Father     Heart disease Father     Colon cancer Sister     Colon polyps Sister     Alzheimer's disease Mother     Coronary artery disease Sister     Coronary artery disease Sister        Social History  Social History     Socioeconomic History    Marital status:    Tobacco Use    Smoking status: Former     Current packs/day: 0.00     Types: Cigarettes     Quit date:      Years since quittin.2    Smokeless tobacco: Never    Tobacco comments:     smoked in [employer]   Vaping Use    Vaping status: Never Used   Substance and Sexual Activity    Alcohol use: No    Drug use:  No    Sexual activity: Defer       Review of Systems:  History obtained from chart review and the patient  General ROS: No fever or chills  Respiratory ROS: No cough, shortness of breath, wheezing  Cardiovascular ROS: No chest pain or palpitations  Gastrointestinal ROS: No abdominal pain or melena  Genito-Urinary ROS: No dysuria or hematuria  Psych ROS: No anxiety and depression  14 point ROS reviewed with the patient and negative except as noted above and in the HPI unless unable to obtain.    Objective:  Patient Vitals for the past 24 hrs:   BP Temp Temp src Pulse Resp SpO2   04/11/24 0740 127/45 97.4 °F (36.3 °C) Axillary 62 16 95 %   04/11/24 0330 125/52 97.7 °F (36.5 °C) Oral 61 16 98 %   04/10/24 2051 123/56 97.7 °F (36.5 °C) Oral 59 16 99 %   04/10/24 1535 129/55 97.7 °F (36.5 °C) -- 55 16 97 %   04/10/24 1138 113/47 97.6 °F (36.4 °C) -- 75 16 93 %       Intake/Output Summary (Last 24 hours) at 4/11/2024 0917  Last data filed at 4/11/2024 0319  Gross per 24 hour   Intake 240 ml   Output 800 ml   Net -560 ml     General: awake/alert   Chest:  clear to auscultation bilaterally without respiratory distress  CVS: regular rate and rhythm  Abdominal: soft, nontender, positive bowel sounds  Extremities:  bilateral 1+ edema  Skin: warm and dry without rash      Labs:  Results from last 7 days   Lab Units 04/11/24  0618 04/11/24  0008 04/10/24  1742 04/10/24  1300 04/10/24  0552 04/09/24  1237 04/09/24  0611   WBC 10*3/mm3 6.33  --   --   --  4.60  --  4.35   HEMOGLOBIN g/dL 8.4* 8.9* 8.7*   < > 8.2*   < > 9.2*   HEMATOCRIT % 26.5* 28.2* 26.7*   < > 26.3*   < > 29.0*   PLATELETS 10*3/mm3 145  --   --   --  173  --  231    < > = values in this interval not displayed.         Results from last 7 days   Lab Units 04/11/24  0618 04/10/24  0553 04/09/24  0610 04/08/24  0623 04/06/24  0340 04/05/24  0512   SODIUM mmol/L 141 141 141 141   < > 139  139   POTASSIUM mmol/L 4.2 4.1 4.1 4.0   < > 4.4  4.4   CHLORIDE mmol/L 104  105 104 104   < > 102  102   CO2 mmol/L 27.0 26.0 26.0 25.0   < > 25.0  25.0   BUN mg/dL 31* 27* 28* 30*   < > 31*  31*   CREATININE mg/dL 2.02* 1.85* 1.84* 1.61*   < > 1.98*  1.98*   CALCIUM mg/dL 9.1 9.2 9.4 9.1   < > 11.1*  11.1*   EGFR mL/min/1.73 35.5* 39.4* 39.7* 46.6*   < > 36.3*  36.3*   BILIRUBIN mg/dL  --   --   --  0.4  --  0.4   ALK PHOS U/L  --   --   --  107  --  138*   ALT (SGPT) U/L  --   --   --  14  --  18   AST (SGOT) U/L  --   --   --  16  --  22   GLUCOSE mg/dL 174* 124* 170* 82   < > 173*  173*    < > = values in this interval not displayed.       Radiology:   Imaging Results (Last 72 Hours)       ** No results found for the last 72 hours. **            Culture:  Blood Culture   Date Value Ref Range Status   03/26/2024 Staphylococcus aureus, MRSA (C)  Final     Comment:       Infectious disease consultation is highly recommended to rule out distant foci of infection.  Methicillin resistant Staphylococcus aureus, Patient may be an isolation risk.   03/26/2024 Staphylococcus aureus, MRSA (C)  Final     Comment:       Infectious disease consultation is highly recommended to rule out distant foci of infection.  Methicillin resistant Staphylococcus aureus, Patient may be an isolation risk.         Assessment    Acute kidney injury, ATN--resolved  Baseline chronic kidney disease stage 3b  Type 2 diabetes, poorly controlled (A1c 10.8%)  Hypertension   Metabolic encephalopathy--improved  Anemia of CKD  Hypokalemia--improved  Sepsis due to infection of left foot wound  Hypercalcemia--improved    Plan:  Renal function about stable  Planning for EGD today  Monitor labs      Stewart Alvarez, APRN  4/11/2024  09:17 CDT

## 2024-04-11 NOTE — DISCHARGE SUMMARY
[facility] Medicine Services  DISCHARGE SUMMARY       Date of Admission: 3/26/2024  Date of Discharge:  4/11/2024  Primary Care Physician: Del Shetty MD    Presenting Problem/History of Present Illness:    Final Discharge Diagnoses:  Active Hospital Problems    Diagnosis     **Sepsis     Chronic heart failure with preserved ejection fraction (HFpEF)     Sepsis due to methicillin resistant Staphylococcus aureus (MRSA) with encephalopathy without septic shock     Diabetic foot infection     Epigastric pain     Chronic kidney disease (CKD), stage IV (severe)     Chronic diastolic heart failure     BPH without obstruction/lower urinary tract symptoms     GERD without esophagitis     Diabetic polyneuropathy associated with type 2 diabetes mellitus     Anemia due to chronic kidney disease     Essential hypertension     Type 2 diabetes mellitus with hyperglycemia, with long-term current use of insulin      Consults: Podiatry, infectious disease, nephrology, urology, gastroenterology    Procedures Performed: Colonoscopy, upper GI endoscopy    Pertinent Test Results:   Results for orders placed during the hospital encounter of 03/26/24    Adult Transthoracic Echo Complete W/ Cont if Necessary Per Protocol    Interpretation Summary    The study is technically difficult for diagnosis.  If there is concern for possible infective endocarditis, recommend transesophageal echocardiogram to better visualize the valves.    Left ventricular systolic function is normal. Left ventricular ejection fraction appears to be 61 - 65%.    Left ventricular wall thickness is consistent with mild concentric hypertrophy    Left ventricular diastolic function is consistent with (grade III w/high LAP) fixed restrictive pattern.    Mildly reduced right ventricular systolic function noted.    The left atrial cavity is mildly dilated.    There is mild calcification of the aortic valve. No aortic valve  regurgitation is present. No hemodynamically significant aortic valve stenosis is present.    Imaging Results (All)       Procedure Component Value Units Date/Time    NM GI Blood Loss [[id number]] Collected: 04/06/24 1327     Updated: 04/06/24 1334    Narrative:      EXAM: NM GI BLOOD LOSS-     INDICATION: Lower GI bleed (Ped 0-17y)     RADIOPHARMACEUTICAL: Tc-99m labeled red cells 27.6 mCi IV     TECHNICAL: The patient's red blood cells were labeled in vitro and  reinjected.  Sequential five-minute anterior planar images of the  abdomen were obtained for two hours.  These were also reformatted into  cine mode.     COMPARISON: CT abdomen pelvis 7/22/2023     FINDINGS:     No evidence of an active GI bleed.       Impression:         No evidence of an active GI bleed.      This report was signed and finalized on 4/6/2024 1:31 PM by Ronal Wisdom.       XR Abdomen KUB [[id number]] Collected: 04/03/24 1345     Updated: 04/03/24 1349    Narrative:      EXAMINATION: XR ABDOMEN KUB- 4/3/2024 1:45 PM     HISTORY: abdominal distention; E11.628-Type 2 diabetes mellitus with  other skin complications; L08.9-Local infection of the skin and  subcutaneous tissue, unspecified; E11.65-Type 2 diabetes mellitus with  hyperglycemia; Z74.09-Other reduced mobility.     REPORT: Supine imaging of the abdomen was performed.     COMPARISON: KUB 10/7/2023.     A large amount of stool seen within the transverse colon and flexures,  likely representing constipation. No evidence of bowel obstruction is  identified. There are no definite urinary tract calculi. Cholecystectomy  clips are present. No acute osseous abnormality is identified.       Impression:      Extensive constipation within the transverse colon and  flexures.     This report was signed and finalized on 4/3/2024 1:46 PM by Dr. Percy Cesar MD.       MRI Foot Left With & Without Contrast [[id number]] Collected: 03/29/24 1856     Updated: 03/29/24 1901    Narrative:       EXAMINATION: MRI FOOT LEFT W WO CONTRAST-     3/29/2024 4:30 PM     HISTORY: Foot swelling, diabetic, osteomyelitis suspected, xray done;  E11.628-Type 2 diabetes mellitus with other skin complications;  L08.9-Local infection of the skin and subcutaneous tissue, unspecified;  E11.65-Type 2 diabetes mellitus with hyperglycemia; Z74.09-Other reduced  mobility     LEFT foot MRI without and with IV gadolinium contrast.  Axial, sagittal, and coronal sequences.     COMPARISON:  LEFT foot x-rays from 3/26/2024.     There is a tiny metal foreign body within the plantar soft tissues  adjacent to the second toe and despite the small size it produces a  prominent amount of artifact related to image distortion.     I see no soft tissue abscess.     No discrete bone edema or bone enhancement is seen to indicate  osteomyelitis.       Impression:      1. There are some limitations to the study based on metal related  artifact.  2. No soft tissue abscess or definite bone marrow edema or enhancement  is seen to indicate osteomyelitis.           This report was signed and finalized on 3/29/2024 6:58 PM by Dr. Gomez Mcgill MD.       XR Foot 3+ View Left [[id number]] Collected: 03/26/24 1723     Updated: 03/26/24 1728    Narrative:      EXAMINATION: XR FOOT 3+ VW LEFT-     3/26/2024 4:19 PM     HISTORY: foot infection     3 view LEFT foot exam.     Previous amputation of the mid/distal fifth metatarsal.     Interval resection or resorption of the fourth metatarsal head.     No bone destruction or soft tissue air is seen.     High-grade degenerative disease at the first metatarsal phalangeal  joint.     Unchanged appearance of prominent dorsal midfoot spurring and prominent  inferior calcaneal spurring.     Mild soft tissue edema over the dorsum of the foot is less prominent as  compared with the previous exam.       Impression:      1. No bone destruction or soft tissue air is seen.  2. Prominent degenerative spurring.           This  report was signed and finalized on 3/26/2024 5:25 PM by Dr. Gomez Mcgill MD.       XR Chest 1 View [[id number]] Collected: 03/26/24 1433     Updated: 03/26/24 1437    Narrative:      EXAM: XR CHEST 1 VW-      DATE: 3/26/2024 1:15 PM     HISTORY: ams       COMPARISON: 8/28/2023.     TECHNIQUE:  Frontal view(s) of the chest submitted.     FINDINGS:    Patient is status post median sternotomy and CABG. There is enlargement  of the cardiac silhouette. There are low lung volumes with vascular  crowding versus mild volume overload. No effusion or pneumothorax is  seen.          Impression:         1. Postoperative chest and low lung volumes with vascular crowding  versus mild volume overload.     This report was signed and finalized on 3/26/2024 2:34 PM by Eran Christina.       CT Head Without Contrast [[id number]] Collected: 03/26/24 1408     Updated: 03/26/24 1413    Narrative:      EXAM: CT HEAD WO CONTRAST-      DATE: 3/26/2024 1:03 PM     HISTORY: ams       COMPARISON: 10/10/2023.     DOSE LENGTH PRODUCT: 876.8 mGy.cm  Automated exposure control was also  utilized to decrease patient radiation dose.     TECHNIQUE: Unenhanced CT images obtained from vertex to skull base with  multiplanar reformats.     FINDINGS:  There is no acute intracranial hemorrhage, midline shift, mass effect,  or hydrocephalus. There is no CT evidence for acute infarct.     There are chronic changes with volume loss and chronic small vessel  ischemic change of the periventricular white matter.      SOFT TISSUES: The scalp soft tissues are unremarkable.        SINUS: There is partially imaged mucosal thickening at the right  maxillary sinus.     ORBITS: The visualized orbits and globes are unremarkable. There is  bilateral lens extraction.          Impression:      1. Chronic changes and no acute intracranial findings.      This report was signed and finalized on 3/26/2024 2:10 PM by Eran Christina.             LAB RESULTS:      Lab  04/11/24  0618 04/11/24  0008 04/10/24  1742 04/10/24  1300 04/10/24  0552 04/09/24  1237 04/09/24  0611 04/08/24  1218 04/08/24  0623 04/07/24  1429 04/07/24  0422 04/06/24  1344 04/06/24  0340   WBC 6.33  --   --   --  4.60  --  4.35  --  6.19  --  5.70  --  5.43   HEMOGLOBIN 8.4* 8.9* 8.7* 8.5* 8.2*   < > 9.2*   < > 8.7*   < > 9.2*   < > 9.5*   HEMATOCRIT 26.5* 28.2* 26.7* 25.9* 26.3*   < > 29.0*   < > 27.0*   < > 29.5*   < > 30.2*   PLATELETS 145  --   --   --  173  --  231  --  246  --  275  --  294   NEUTROS ABS 3.99  --   --   --  2.34  --  1.96  --  4.06  --  3.15  --  2.92   IMMATURE GRANS (ABS) 0.02  --   --   --  0.01  --  0.01  --  0.02  --  0.02  --  0.02   LYMPHS ABS 1.69  --   --   --  1.63  --  1.74  --  1.37  --  1.69  --  1.69   MONOS ABS 0.45  --   --   --  0.42  --  0.43  --  0.54  --  0.55  --  0.53   EOS ABS 0.15  --   --   --  0.17  --  0.18  --  0.16  --  0.25  --  0.23   MCV 88.6  --   --   --  89.5  --  89.0  --  87.7  --  89.9  --  89.6   PROTIME  --   --   --   --   --   --   --   --   --   --   --   --  15.0*   APTT  --   --   --   --   --   --   --   --   --   --   --   --  39.2*    < > = values in this interval not displayed.         Lab 04/11/24  0618 04/10/24  0553 04/09/24  0610 04/08/24  0623 04/07/24  0422 04/06/24  0340 04/05/24  0512   SODIUM 141 141 141 141 141   < > 139  139   POTASSIUM 4.2 4.1 4.1 4.0 4.0   < > 4.4  4.4   CHLORIDE 104 105 104 104 103   < > 102  102   CO2 27.0 26.0 26.0 25.0 27.0   < > 25.0  25.0   ANION GAP 10.0 10.0 11.0 12.0 11.0   < > 12.0  12.0   BUN 31* 27* 28* 30* 33*   < > 31*  31*   CREATININE 2.02* 1.85* 1.84* 1.61* 1.75*   < > 1.98*  1.98*   EGFR 35.5* 39.4* 39.7* 46.6* 42.1*   < > 36.3*  36.3*   GLUCOSE 174* 124* 170* 82 94   < > 173*  173*   CALCIUM 9.1 9.2 9.4 9.1 10.1   < > 11.1*  11.1*   MAGNESIUM  --   --   --   --   --   --  2.3    < > = values in this interval not displayed.         Lab 04/08/24 0623 04/05/24  0512   TOTAL PROTEIN  5.9* 7.2   ALBUMIN 3.3* 3.8   GLOBULIN 2.6 3.4   ALT (SGPT) 14 18   AST (SGOT) 16 22   BILIRUBIN 0.4 0.4   ALK PHOS 107 138*         Lab 04/06/24  0340   PROTIME 15.0*   INR 1.13*                 Brief Urine Lab Results  (Last result in the past 365 days)        Color   Clarity   Blood   Leuk Est   Nitrite   Protein   CREAT   Urine HCG        03/28/24 2210             120.7               Microbiology Results (last 10 days)       ** No results found for the last 240 hours. **            Hospital Course:   Erick Luong is a 68-year-old male with a past medical history of coronary artery disease, diastolic dysfunction followed by Dr. Garay, vascular disease, GERD, diabetes, diabetic gastropathy on Reglan, chronic anemia, and chronic nonhealing wound to the left foot followed by [facility]. Patient had an extended hospitalization 8/2023 due to syncope, subsequent admission to [facility] 9/18 - 10/11, 2023.  During that admission he did undergo debridement of a Proteus foot wound infection.  He presents with complaints of worsening left foot erythema, left foot ulcer and confusion of 1 day duration.     Patient's wife states that they were seen by podiatrist Dr. Gomes in [facility] today prior to presentation who performed debridement on the plantar wound treating with Santyl, is also a area on dorsal aspect of the foot.  Foot is erythemic, warm to touch.  Doctor told patient and wife he was doing well. There was no signs of infection yesterday. Today patient was brought to emergency department via EMS for altered mental status with disorientation, glucose was noted to be over 500 in the ambulance.  Initial glucose here 400 followed with 438 and he was treated with regular insulin.  Significant results in the ER showed creatinine 1.9 at baseline, CRP 4.3, lactic acid 3.2 and procalcitonin 6.56.  He does have a white count of 14.6, no bandemia.  He was febrile to 100.7 Fahrenheit.  His left foot wound did have some  "erythema and multiple ulcers which appeared infected.  He was admitted and treated for sepsis thought to be from his left foot wounds.  He was started on IV antibiotics with linezolid due to vancomycin allergy.  Blood cultures grew MRSA.  Patient was reviewed by infectious disease consultant and eventually was transitioned to p.o. Zyvox on discharge.  His mental status normalized prior to discharge.  He had some abdominal pain which was thought to be from his diabetic gastroparesis and is improved prior to discharge with Reglan and antiemetics and opiate pain medications.  He was reviewed by urology and nephrology during admission and his renal function remained at his baseline due to his CKD.  He also had some rectal bleeding during the admission and he was seen by gastroenterology.  He had colonoscopy with clipping of some bleeding lesions in the colon.  His aspirin and Eliquis home medication was discontinued and due to the bowel preparation for his colonoscopy being suboptimal, he was instructed to follow-up with a gastroenterologist after discharge for repeat colonoscopy with a proper bowel prep.  His hemoglobin was stable prior to discharge and was discharged in stable condition    Physical Exam on Discharge:  /62 (BP Location: Right arm, Patient Position: Sitting)   Pulse 62   Temp 97.4 °F (36.3 °C) (Axillary)   Resp 14   Ht 182.9 cm (72\")   Wt 131 kg (289 lb 3.2 oz)   SpO2 99%   BMI 39.22 kg/m²   Physical Exam  Constitutional:       General: He is not in acute distress.     Appearance: He is well-developed. He is not diaphoretic.   HENT:      Head: Normocephalic.   Eyes:      General: No scleral icterus.     Conjunctiva/sclera: Conjunctivae normal.      Pupils: Pupils are equal, round, and reactive to light.   Neck:      Thyroid: No thyromegaly.      Vascular: No JVD.      Trachea: No tracheal deviation.   Cardiovascular:      Rate and Rhythm: Normal rate and regular rhythm.      Heart sounds: " Normal heart sounds. No murmur heard.     No friction rub. No gallop.   Pulmonary:      Effort: Pulmonary effort is normal. No respiratory distress.      Breath sounds: Normal breath sounds. No stridor. No wheezing or rales.   Chest:      Chest wall: No tenderness.   Abdominal:      General: Bowel sounds are normal. There is no distension.      Palpations: Abdomen is soft. There is no mass.      Tenderness: There is abdominal tenderness. There is no guarding or rebound.      Hernia: No hernia is present.   Musculoskeletal:         General: Swelling and signs of injury present. No tenderness or deformity. Normal range of motion.      Cervical back: Normal range of motion and neck supple.      Right lower leg: No edema.      Comments: Dressing over left foot noted.   Lymphadenopathy:      Cervical: No cervical adenopathy.   Skin:     General: Skin is warm and dry.      Coloration: Skin is not pale.      Findings: No erythema or rash.   Neurological:      General: No focal deficit present.      Mental Status: He is alert and oriented to person, place, and time.      Cranial Nerves: No cranial nerve deficit.      Sensory: No sensory deficit.      Motor: No abnormal muscle tone.      Coordination: Coordination normal.   Psychiatric:         Mood and Affect: Mood normal.         Behavior: Behavior normal.     Condition on Discharge: Stable    Discharge Disposition:  Home-Health Care [facility]    Discharge Medications:     Discharge Medications        New Medications        Instructions Start Date   donepezil 10 MG tablet  Commonly known as: ARICEPT   10 mg, Oral, Daily      famotidine 20 MG tablet  Commonly known as: PEPCID   Take 1 tablet twice a day by oral route.      linezolid 600 MG tablet  Commonly known as: ZYVOX   600 mg, Oral, Every 12 Hours Scheduled      magnesium hydroxide 400 MG/5ML suspension  Commonly known as: Milk of Magnesia   Take 30mL by mouth once daily for 30 days.      methylcellulose oral  powder  Commonly known as: Citrucel   Mix 5g as directed and drink twice daily for 90 days.      ondansetron ODT 4 MG disintegrating tablet  Commonly known as: ZOFRAN-ODT   4 mg, Translingual, Every 8 Hours PRN      pantoprazole 40 MG EC tablet  Commonly known as: Protonix   40 mg, Oral, 2 Times Daily             Changes to Medications        Instructions Start Date   bumetanide 2 MG tablet  Commonly known as: BUMEX  What changed: additional instructions   2 mg, Oral, Daily   Start Date: April 12, 2024     carvedilol 3.125 MG tablet  Commonly known as: COREG  What changed:   medication strength  how much to take  when to take this   Take 1 tablet twice a day by oral route.      DULoxetine 60 MG capsule  Commonly known as: CYMBALTA  What changed: Another medication with the same name was removed. Continue taking this medication, and follow the directions you see here.   60 mg, Oral, Daily      HumuLIN R U-500 KwikPen 500 UNIT/ML solution pen-injector CONCENTRATED injection  Generic drug: Insulin Regular Human (Conc)  What changed:   See the new instructions.  Another medication with the same name was removed. Continue taking this medication, and follow the directions you see here.   Inject 15 Units under the skin into the appropriate area as directed 3 (Three) Times a Day Before Meals AND 40 Units Daily.      melatonin 3 MG tablet  What changed: how much to take   6 mg, Oral, Nightly PRN      sennosides-docusate 8.6-50 MG per tablet  Commonly known as: PERICOLACE  What changed: Another medication with the same name was removed. Continue taking this medication, and follow the directions you see here.   1 tablet, Oral, Nightly, Obtain OTC             Continue These Medications        Instructions Start Date   ascorbic acid 1000 MG tablet  Commonly known as: VITAMIN C   1,000 mg, Oral, Daily      calcitriol 0.5 MCG capsule  Commonly known as: ROCALTROL   0.5 mcg, Oral, Daily      D-5000 125 MCG (5000 UT) tablet  Generic  drug: Cholecalciferol   125 mcg, Oral, Daily Before Lunch      Diclofenac Sodium 1 % gel gel  Commonly known as: VOLTAREN   2 g, Topical, 4 Times Daily PRN      fluticasone 50 MCG/ACT nasal spray  Commonly known as: FLONASE   1 spray, Nasal, Daily      isosorbide mononitrate 30 MG 24 hr tablet  Commonly known as: IMDUR   30 mg, Oral, Daily      Lantus SoloStar 100 UNIT/ML injection pen  Generic drug: Insulin Glargine   20 Units, Subcutaneous, Every Evening      metoclopramide 5 MG tablet  Commonly known as: REGLAN   5 mg, Oral, 3 times daily      midodrine 2.5 MG tablet  Commonly known as: PROAMATINE   2.5 mg, Oral, 3 Times Daily Before Meals      oxyCODONE 10 MG tablet  Commonly known as: ROXICODONE   Take 1 tablet by mouth twice a day as needed      polyethylene glycol 17 g packet  Commonly known as: MIRALAX   17 g, Oral, Daily, Obtain OTC      pregabalin 100 MG capsule  Commonly known as: LYRICA   200 mg, Oral, Nightly      pregabalin 50 MG capsule  Commonly known as: LYRICA   50 mg, Oral, Daily      rosuvastatin 10 MG tablet  Commonly known as: CRESTOR   10 mg, Oral, Daily      sucralfate 1 g tablet  Commonly known as: CARAFATE   1 g, Oral, 3 times daily      tamsulosin 0.4 MG capsule 24 hr capsule  Commonly known as: FLOMAX   0.4 mg, Oral, Daily      timolol 0.5 % ophthalmic solution  Commonly known as: TIMOPTIC   1 drop, Both Eyes, 2 Times Daily      valsartan 40 MG tablet  Commonly known as: DIOVAN   40 mg, Oral, Daily      zinc sulfate 50 MG capsule  Commonly known as: ZINCATE   50 mg, Oral, Daily             Stop These Medications      amitriptyline 25 MG tablet  Commonly known as: ELAVIL     apixaban 5 MG tablet tablet  Commonly known as: ELIQUIS     Aspirin 81 MG capsule     busPIRone 10 MG tablet  Commonly known as: BUSPAR     cloNIDine 0.1 MG tablet  Commonly known as: CATAPRES     HYDROcodone-acetaminophen 7.5-325 MG per tablet  Commonly known as: NORCO     Jardiance 25 MG tablet tablet  Generic drug:  empagliflozin     nitroglycerin 0.4 MG SL tablet  Commonly known as: NITROSTAT     ondansetron 4 MG tablet  Commonly known as: ZOFRAN     prazosin 1 MG capsule  Commonly known as: MINIPRESS     sodium hypochlorite 0.125 % solution topical solution 0.125%  Commonly known as: DAKIN'S 1/4 STRENGTH     sodium hypochlorite 0.25 % topical solution  Commonly known as: DAKIN'S     Trulicity 4.5 MG/0.5ML solution pen-injector  Generic drug: Dulaglutide              This patient does not have current or prior documentation of an left ventricular ejection fraction (LVEF) of less than or equal to 40%.    Discharge instruction:  1.  Follow-up with your primary care provider within 1 week of discharge.   2.  Follow-up with nephrologist Dr. Davidson within 1 week of discharge.   3.  You have been given a referral to follow-up with gastroenterology at [facility] within the next 2 weeks for repeat colonoscopy to evaluate your rectal bleeding.   4.  You should follow-up with your regular podiatrist, Dr. Gomes at [place] for evaluation of your left foot wound.     Discharge diet:   Diet Instructions       Diet: Cardiac Diets, Diabetic Diets; Healthy Heart (2-3 Na+); Thin (IDDSI 0); Consistent Carbohydrate      Discharge Diet:  Cardiac Diets  Diabetic Diets       Cardiac Diet: Healthy Heart (2-3 Na+)    Fluid Consistency: Thin (IDDSI 0)    Diabetic Diet: Consistent Carbohydrate            Activity at Discharge:   Activity Instructions       Activity as Tolerated              Follow-up Appointments:   Future Appointments   Date Time Provider Department Center   5/22/2024  7:00 AM [facility] US [facility] CART 3 [facility]   5/22/2024  9:45 AM Jessica Rodgers APRN [facility] VS [facility]   8/5/2024  9:00 AM Emeka Garay MD [facility] CD [facility]       Test Results Pending at Discharge: None    Electronically signed by Rene Maloney MD, 04/11/24, 15:57 CDT.    Time: 40 minutes of time was spent evaluating patient and planning  discharge.

## 2024-04-11 NOTE — ANESTHESIA POSTPROCEDURE EVALUATION
Patient: Erick Luong    Procedure Summary       Date: 04/11/24 Room / Location: [facility] ENDOSCOPY 2 /  [facility] ENDOSCOPY    Anesthesia Start: 1221 Anesthesia Stop: 1247    Procedure: ESOPHAGOGASTRODUODENOSCOPY WITH ANESTHESIA Diagnosis:       GERD without esophagitis      Epigastric pain      (GERD without esophagitis [K21.9])      (Epigastric pain [R10.13])    Surgeons: Vaibhav Otoole MD Provider: Bryce Cerrato CRNA    Anesthesia Type: MAC ASA Status: 3            Anesthesia Type: MAC    Vitals  Vitals Value Taken Time   BP     Temp     Pulse 62 04/11/24 1247   Resp     SpO2 95 % 04/11/24 1247   Vitals shown include unfiled device data.        Post Anesthesia Care and Evaluation    Patient location during evaluation: PHASE II  Patient participation: complete - patient participated  Level of consciousness: awake and alert  Pain score: 0  Pain management: adequate    Airway patency: patent  Anesthetic complications: No anesthetic complications  PONV Status: none  Cardiovascular status: acceptable  Respiratory status: acceptable  Hydration status: acceptable  No anesthesia care post op

## 2024-04-11 NOTE — PLAN OF CARE
Goal Outcome Evaluation:  Plan of Care Reviewed With: patient        Progress: no change       Pt with complaints of abd pain during shift; prn pain med and bentyl given. No IV access. Up with assist. Voiding per urinal. NPO for EGD later today. Dressing changed to left foot per order. Accu check. SCDs for VTE prevention. Contact precautions maintained. Safety maintained. Bed/chair alarm on.

## 2024-04-11 NOTE — INTERVAL H&P NOTE
H&P updated. The patient was examined and Patient's clinical picture reviewed.  Noted to have persistent epigastric discomfort and abdominal pain with reflux.  Given the patient's history of esophagitis and overall clinical picture with persistent symptoms, will proceed with upper endoscopy.  Risk, benefits and alternatives explained in detail and consent obtained. Close follow-up based on the above.

## 2024-04-11 NOTE — ANESTHESIA PREPROCEDURE EVALUATION
Anesthesia Evaluation     no history of anesthetic complications:   NPO Solid Status: > 8 hours  NPO Liquid Status: > 8 hours           Airway   Mallampati: III  Dental      Pulmonary    (+) ,sleep apnea  Cardiovascular   Exercise tolerance: good (4-7 METS)    (+) hypertension, past MI , CAD, CABG, PVD, hyperlipidemia      Neuro/Psych  GI/Hepatic/Renal/Endo    (+) obesity, GERD, renal disease- CRI, diabetes mellitus type 2 using insulin    Musculoskeletal     Abdominal    Substance History      OB/GYN          Other   arthritis, blood dyscrasia anemia,                   Anesthesia Plan    ASA 3     MAC     intravenous induction     Anesthetic plan, risks, benefits, and alternatives have been provided, discussed and informed consent has been obtained with: patient.    CODE STATUS:    Level Of Support Discussed With: Health Care Surrogate  Code Status (Patient has no pulse and is not breathing): CPR (Attempt to Resuscitate)  Medical Interventions (Patient has pulse or is breathing): Full Support       No new care gaps identified.  [facility] Embedded Care Gaps. Reference number: [id number]. 8/11/2022   5:57:42 PM CDT

## 2024-04-11 NOTE — PAYOR COMM NOTE
"Erick Luong (68 y.o. Male)   [id number]    cont stay   Please review clinical for additional  days   [facility] phone    fax           Date of Birth   1955    Social Security Number       Address   [address] [place] [address]    Home Phone   [phone]    MRN   [id number]       [facility]    Marital Status                               Admission Date   3/26/24    Admission Type   Emergency    Admitting Provider   Rene Maloney MD    Attending Provider   Rene Maloney MD    Department, Room/Bed   [facility] 3C, 372/1       Discharge Date       Discharge Disposition       Discharge Destination                                 Attending Provider: Rene Maloney MD    Allergies: Cefepime, Bactrim [Sulfamethoxazole-trimethoprim], Vancomycin, Zolpidem, Zolpidem Tartrate, Metronidazole    Isolation: Contact   Infection: MRSA (05/19/19), COVID (History) (08/08/22)   Code Status: CPR    Ht: 182.9 cm (72\")   Wt: 131 kg (289 lb 3.2 oz)    Admission Cmt: None   Principal Problem: Sepsis [A41.9]                   Active Insurance as of 3/26/2024       Primary Coverage       Payor Plan Insurance Group Employer/Plan Group    [employer] EMPLOYEE [id number]       Payor Plan Address Payor Plan Phone Number Payor Plan Fax Number Effective Dates    [address] [phone]  1/1/2022 - None Entered    [facility] [id number]         Subscriber Name Subscriber Birth Date Member ID       ZAINAB LUONG 11/27/1969 [id number]               Secondary Coverage       Payor Plan Insurance Group Employer/Plan Group    MEDICARE MEDICARE A & B        Payor Plan Address Payor Plan Phone Number Payor Plan Fax Number Effective Dates    [address] [phone]  7/1/2013 - None Entered    [facility] [id number]         Subscriber Name Subscriber Birth Date Member ID       ERICK LUONG 1955 [id number]  "                    Emergency Contacts        (Rel.) Home Phone Work Phone Mobile Phone    Joan Luong (Spouse) [phone]              Vital Signs (last day)       Date/Time Temp Temp src Pulse Resp BP Patient Position SpO2    04/11/24 0740 97.4 (36.3) Axillary 62 16 127/45 Lying 95    04/11/24 0330 97.7 (36.5) Oral 61 16 125/52 Lying 98    04/10/24 2051 97.7 (36.5) Oral 59 16 123/56 Lying 99    04/10/24 1535 97.7 (36.5) -- 55 16 129/55 Lying 97    04/10/24 1138 97.6 (36.4) -- 75 16 113/47 Sitting 93    04/10/24 0805 97.6 (36.4) -- 59 16 109/42 Lying 98    04/10/24 0418 97.6 (36.4) Oral 61 16 110/50 Lying 98    04/10/24 0026 98.2 (36.8) Oral 67 18 121/53 Lying 98          Current Facility-Administered Medications   Medication Dose Route Frequency Provider Last Rate Last Admin    acetaminophen (TYLENOL) tablet 650 mg  650 mg Oral Q4H PRN Raymond Mederos MD   650 mg at 04/10/24 2141    Or    acetaminophen (TYLENOL) 160 MG/5ML oral solution 650 mg  650 mg Oral Q4H PRN Raymond Mederos MD   650 mg at 03/27/24 2109    Or    acetaminophen (TYLENOL) suppository 650 mg  650 mg Rectal Q4H PRN Raymond Mederos MD        ascorbic acid (VITAMIN C) tablet 1,000 mg  1,000 mg Oral Daily Raymond Mederos MD   1,000 mg at 04/10/24 0828    sennosides-docusate (PERICOLACE) 8.6-50 MG per tablet 1 tablet  1 tablet Oral BID Raymond Mederos MD   1 tablet at 04/09/24 2107    And    polyethylene glycol (MIRALAX) packet 17 g  17 g Oral Daily Raymond Mederos MD   17 g at 04/06/24 0932    And    bisacodyl (DULCOLAX) EC tablet 5 mg  5 mg Oral Daily PRN Raymond Mederos MD        And    bisacodyl (DULCOLAX) suppository 10 mg  10 mg Rectal Daily PRN Raymond Mederos MD        bumetanide (BUMEX) tablet 2 mg  2 mg Oral Daily Vinny Hartley MD   2 mg at 04/10/24 0820    carvedilol (COREG) tablet 3.125 mg  3.125 mg Oral BID With Meals Rene Maloney MD   3.125 mg at 04/10/24 1360    dextrose  (D50W) (25 g/50 mL) IV injection 25 g  25 g Intravenous Q15 Min PRN Raymond Mederos MD        dextrose (GLUTOSE) oral gel 15 g  15 g Oral Q15 Min PRN Raymond Mederos MD   15 g at 03/27/24 1710    Diclofenac Sodium (VOLTAREN) 1 % gel 2 g  2 g Topical 4x Daily PRN Raymond Mederos MD        dicyclomine (BENTYL) capsule 20 mg  20 mg Oral TID PRN Raymond Mederos MD   20 mg at 04/11/24 0004    donepezil (ARICEPT) tablet 10 mg  10 mg Oral Daily Raymond Mederos MD   10 mg at 04/10/24 0827    [Transfer Hold] glucagon (GLUCAGEN) injection 1 mg  1 mg Intramuscular Q15 Min PRN Raquel Duncan, APRN        hydrocortisone (ANUSOL-HC) suppository 25 mg  25 mg Rectal BID Raymond Mederos MD   25 mg at 04/09/24 0828    insulin detemir (LEVEMIR) injection 30 Units  30 Units Subcutaneous Nightly Raymond Mederos MD   15 Units at 04/09/24 2117    Insulin Lispro (humaLOG) injection 4-24 Units  4-24 Units Subcutaneous 4x Daily AC & at Bedtime Raymond Mederos MD   4 Units at 04/10/24 2136    insulin regular (humuLIN R,novoLIN R) injection 10 Units  10 Units Subcutaneous TID AC Raymond Mederos MD   10 Units at 04/11/24 0826    isosorbide mononitrate (IMDUR) 24 hr tablet 30 mg  30 mg Oral Q24H Raymond Mederos MD   30 mg at 04/10/24 0829    lactulose solution 20 g  20 g Oral TID Ryamond Mederos MD   20 g at 04/09/24 2107    linezolid (ZYVOX) tablet 600 mg  600 mg Oral Q12H Raymond Mederos MD   600 mg at 04/10/24 2137    magnesium hydroxide (MILK OF MAGNESIA) suspension 10 mL  10 mL Oral Daily PRN Raymond Mederos MD   10 mL at 04/03/24 0414    melatonin tablet 6 mg  6 mg Oral Nightly Raymond Mederos MD   6 mg at 04/10/24 2137    midodrine (PROAMATINE) tablet 2.5 mg  2.5 mg Oral TID AC Raymond Mederos MD   2.5 mg at 04/10/24 0828    mupirocin (BACTROBAN) 2 % nasal ointment 1 Application  1 Application Each Nare BID Raymond Mederos MD   1 Application at 04/10/24 0702    nitroglycerin (NITROSTAT) SL tablet 0.4 mg  0.4 mg Sublingual Q5 Min PRN  Raymond Mederos MD        ondansetron ODT (ZOFRAN-ODT) disintegrating tablet 4 mg  4 mg Oral Q6H PRN Raymond Mederos MD   4 mg at 04/11/24 0004    Or    ondansetron (ZOFRAN) injection 4 mg  4 mg Intravenous Q6H PRN Raymond Mederos MD   4 mg at 03/29/24 1837    oxyCODONE (ROXICODONE) immediate release tablet 10 mg  10 mg Oral BID PRN Raymond Mederos MD   10 mg at 04/11/24 0323    pantoprazole (PROTONIX) EC tablet 40 mg  40 mg Oral BID AC Rene Maloney MD   40 mg at 04/10/24 1654    pregabalin (LYRICA) capsule 100 mg  100 mg Oral Nightly Rene Maloney MD   100 mg at 04/10/24 2137    pregabalin (LYRICA) capsule 50 mg  50 mg Oral Daily Rene Maloney MD   50 mg at 04/10/24 0828    rosuvastatin (CRESTOR) tablet 10 mg  10 mg Oral Nightly Raymond Mederos MD   10 mg at 04/10/24 2137    sodium chloride 0.9 % flush 10 mL  10 mL Intravenous PRN Raymond Mederos MD        sodium chloride 0.9 % flush 10 mL  10 mL Intravenous Q12H Raymond Mederos MD   10 mL at 04/07/24 2050    sodium chloride 0.9 % flush 10 mL  10 mL Intravenous PRN Raymond Mederos MD        sodium chloride 0.9 % flush 10 mL  10 mL Intravenous Q12H Tj Hardwick CRNA        sodium chloride 0.9 % flush 10 mL  10 mL Intravenous PRN Tj Hardwick CRNA        sodium chloride 0.9 % infusion 40 mL  40 mL Intravenous PRN Raymond Mederos MD        sodium chloride 0.9 % infusion 40 mL  40 mL Intravenous PRN Tj Hardwick CRNA        sodium chloride 0.9 % infusion  100 mL/hr Intravenous Continuous Tj Hardwick CRNA        sucralfate (CARAFATE) 1 GM/10ML suspension 1 g  1 g Oral TID AC Raymond Mederos MD   1 g at 04/10/24 1654    tamsulosin (FLOMAX) 24 hr capsule 0.4 mg  0.4 mg Oral Daily Raymond Mederos MD   0.4 mg at 04/10/24 0828    timolol (TIMOPTIC) 0.25 % ophthalmic solution 1 drop  1 drop Both Eyes Q12H Raymond Mederos MD   1 drop at 04/10/24 2138    zinc sulfate (ZINCATE) capsule 220 mg  220 mg Oral Daily Raymond Mederos MD   220 mg at 04/10/24  0827        Physician Progress Notes (last 24 hours)        Stewart Alvarez, APRN at 04/11/24 0916          Nephrology ([facility]) Progress Note      Patient:  Erick Luong  YOB: 1955  Date of Service: 4/11/2024  MRN: [id number]   Acct: [id number]   Primary Care Physician: Del Shetty MD  Advance Directive:   Code Status and Medical Interventions:   Ordered at: 03/26/24 1815     Level Of Support Discussed With:    Health Care Surrogate     Code Status (Patient has no pulse and is not breathing):    CPR (Attempt to Resuscitate)     Medical Interventions (Patient has pulse or is breathing):    Full Support     Admit Date: 3/26/2024       Hospital Day: 16  Referring Provider: No ref. provider found      Patient personally seen and examined.  Complete chart including Consults, Notes, Operative Reports, Labs, Cardiology, and Radiology studies reviewed as able.        Subjective:  Erick Luong is a 68 y.o. male for whom we were consulted for evaluation and treatment of acute kidney injury.  He has stage IIIb chronic kidney disease baseline, follows with Dr Lomas in our office.  Baseline creatinine approximately 1.8. He has history of insulin-dependent type 2 diabetes, hypertension, abdominal obesity, obstructive sleep apnea, diabetic foot ulcer and coronary artery disease.   Patient presented to ER on 3/26 with altered mental status. Had debridement of ongoing wound on left foot the day prior. Left foot warm and erythremic on arrival to ER. Noted to have elevated blood glucose over 400. Initial creatinine of 1.9.  Admitted to medical floor for sepsis due to left foot wound. Patient has been agitated and confused during the admission, requiring wrist restraints due to pulling at lines and tubes. Renal function and mentation have been improving. Now has a sitter present in room due to repeatedly getting up unattended. He is s/p colonoscopy on 4/07 due to rectal bleding.      Today is awake and alert. No new complaints. Did have some abdominal pain overnight.  Scheduled for EGD today. Urine output nonoliguric     Allergies:  Cefepime, Bactrim [sulfamethoxazole-trimethoprim], Vancomycin, Zolpidem, Zolpidem tartrate, and Metronidazole    Home Meds:  Medications Prior to Admission   Medication Sig Dispense Refill Last Dose    amitriptyline (ELAVIL) 25 MG tablet Take 2 tablets by mouth Daily at bedtime. (Patient not taking: Reported on 3/27/2024) 60 tablet 1 Not Taking    apixaban (ELIQUIS) 5 MG tablet tablet Take 1 tablet by mouth Every 12 (Twelve) Hours.       ascorbic acid (VITAMIN C) 1000 MG tablet Take 1 tablet by mouth Daily. 30 tablet 3     Aspirin 81 MG capsule Take 81 mg by mouth Daily.       bumetanide (BUMEX) 1 MG tablet Take 1 tablet every day by oral route for 30 days. 30 tablet 0     bumetanide (BUMEX) 2 MG tablet Take 1 tablet by mouth Daily. Take 2 tablets in morning;1 tablet at night (Patient not taking: Reported on 3/27/2024)   Not Taking    busPIRone (BUSPAR) 10 MG tablet Take 1 tablet by mouth 3 (Three) Times a Day.       calcitriol (ROCALTROL) 0.5 MCG capsule Take 1 capsule by mouth Daily. 90 capsule 4     calcitriol (ROCALTROL) 0.5 MCG capsule Take 1 capsule every day by oral route for 90 days. (Patient not taking: Reported on 3/27/2024) 90 capsule 4 Not Taking    carvedilol (COREG) 3.125 MG tablet Take 1 tablet twice a day by oral route. 60 tablet 4     carvedilol (COREG) 6.25 MG tablet Take 1 tablet by mouth 2 (Two) Times a Day. (Patient not taking: Reported on 3/27/2024) 180 tablet 4 Not Taking    Cholecalciferol (D-5000) 125 MCG (5000 UT) tablet Take 1 tablet by mouth Daily Before Lunch. 30 tablet 2     cloNIDine (CATAPRES) 0.1 MG tablet Take 1 tablet by mouth Every 12 (Twelve) Hours. (Patient not taking: Reported on 3/27/2024) 60 tablet 2 Not Taking    Diclofenac Sodium (VOLTAREN) 1 % gel gel Apply 2 g topically to the appropriate area as directed 4 (Four)  Times a Day As Needed. 300 g 11     Diclofenac Sodium (VOLTAREN) 1 % gel gel Apply 2 g topically to the appropriate area as directed 4 (Four) Times a Day. (Patient not taking: Reported on 3/27/2024) 300 g 11 Not Taking    donepezil (ARICEPT) 10 MG tablet Take 1 tablet by mouth Daily. (Patient not taking: Reported on 3/27/2024) 90 tablet 4 Not Taking    Dulaglutide (Trulicity) 4.5 MG/0.5ML solution pen-injector Inject 0.5 mL under the skin into the appropriate area as directed 1 (One) Time Per Week. (Patient not taking: Reported on 3/27/2024) 2 mL 11 Not Taking    DULoxetine (CYMBALTA) 60 MG capsule Take 1 capsule by mouth Daily. 90 capsule 4     DULoxetine (CYMBALTA) 60 MG capsule Take 1 capsule by mouth Daily. (Patient not taking: Reported on 3/27/2024) 90 capsule 4 Not Taking    DULoxetine (CYMBALTA) 60 MG capsule Take 1 capsule by mouth Daily. (Patient not taking: Reported on 3/27/2024) 90 capsule 4 Not Taking    empagliflozin (JARDIANCE) 25 MG tablet tablet Take 1 tablet by mouth Daily. (Patient not taking: Reported on 3/27/2024) 30 tablet 2 Not Taking    famotidine (PEPCID) 20 MG tablet Take 1 tablet twice a day by oral route. 180 tablet 4     fluticasone (FLONASE) 50 MCG/ACT nasal spray 1 spray into the nostril(s) as directed by provider Daily. (Patient taking differently: 1 spray into the nostril(s) as directed by provider 2 (Two) Times a Day.) 16 g 1     HYDROcodone-acetaminophen (NORCO) 7.5-325 MG per tablet Take 1 tablet by mouth 2 (Two) Times a Day As Needed. (Patient not taking: Reported on 3/27/2024) 40 tablet 0 Not Taking    Insulin Glargine (Lantus SoloStar) 100 UNIT/ML injection pen Inject 20 Units under the skin into the appropriate area as directed Every Evening. 15 mL 3     Insulin Regular Human, Conc, (HumuLIN R U-500 KwikPen) 500 UNIT/ML solution pen-injector CONCENTRATED injection Inject 120 Units under the skin into the appropriate area as directed 2 (Two) Times a Day with breakfast and  dinner. (Patient not taking: Reported on 3/27/2024) 18 mL 5 Not Taking    Insulin Regular Human, Conc, (HumuLIN R U-500 KwikPen) 500 UNIT/ML solution pen-injector CONCENTRATED injection Inject 40 Units under the skin into the appropriate area with regular meals AND 40 Units with large meals. 54 mL 3     isosorbide mononitrate (IMDUR) 30 MG 24 hr tablet Take 1 tablet by mouth Daily.       [] magnesium hydroxide (Milk of Magnesia) 400 MG/5ML suspension Take 30 mL every day by oral route for 30 days. 900 mL 0     magnesium hydroxide (Milk of Magnesia) 400 MG/5ML suspension Take 30mL by mouth once daily for 30 days. (Patient not taking: Reported on 3/27/2024) 900 mL 0 Not Taking    melatonin 3 MG tablet Take 1 tablet by mouth At Night As Needed for Sleep.       methylcellulose (Citrucel) oral powder Mix 5g as directed and drink twice daily for 90 days. 900 g 10     metoclopramide (REGLAN) 5 MG tablet Take 1 tablet by mouth 3 times a day.       midodrine (PROAMATINE) 2.5 MG tablet Take 1 tablet by mouth 3 (Three) Times a Day Before Meals.       nitroglycerin (NITROSTAT) 0.4 MG SL tablet Dissolve 1 tablet by mouth every 5 minutes as needed for chest pain; MAX 3 tabs/24 hours.  If no improvement after 3 tabs go to ER 25 tablet 0     ondansetron (ZOFRAN) 4 MG tablet Take 1 tablet by mouth Every 6 (Six) Hours As Needed for Nausea or Vomiting.       oxyCODONE (ROXICODONE) 10 MG tablet Take 1 tablet by mouth twice a day as needed 60 tablet 0     pantoprazole (Protonix) 40 MG EC tablet Take 1 tablet by mouth 2 (Two) Times a Day. (Patient not taking: Reported on 3/27/2024) 180 tablet 4 Not Taking    polyethylene glycol (MIRALAX) 17 g packet Take 17 g by mouth Daily. Obtain OTC       prazosin (MINIPRESS) 1 MG capsule Take 1 capsule by mouth every night at bedtime. 30 capsule 2     pregabalin (LYRICA) 100 MG capsule Take 2 capsules by mouth Every Night.       pregabalin (LYRICA) 50 MG capsule Take 1 capsule by mouth Daily.        rosuvastatin (CRESTOR) 10 MG tablet Take 1 tablet by mouth Daily.       sennosides-docusate (PERICOLACE) 8.6-50 MG per tablet Take 1 tablet by mouth Every Night. Obtain OTC       sennosides-docusate (PERICOLACE) 8.6-50 MG per tablet Take 1 tablet by mouth every night at bedtime. (Patient not taking: Reported on 3/27/2024) 30 tablet 2 Not Taking    sodium hypochlorite (DAKIN'S 1/4 STRENGTH) 0.125 % solution topical solution 0.125% Apply to affected area twice daily (Patient not taking: Reported on 3/27/2024) 473 mL 3 Not Taking    sodium hypochlorite (DAKIN'S 1/4 STRENGTH) 0.125 % solution topical solution 0.125% Apply to affected area twice daily as directed (Patient not taking: Reported on 3/27/2024) 473 mL 5 Not Taking    sodium hypochlorite (DAKIN'S) 0.25 % topical solution Use to wound daily as instructed 473 mL 1     sucralfate (CARAFATE) 1 g tablet Take 1 tablet by mouth 3 times a day.       tamsulosin (FLOMAX) 0.4 MG capsule 24 hr capsule Take 1 capsule by mouth Daily. 90 capsule 1     timolol (TIMOPTIC) 0.5 % ophthalmic solution Administer 1 drop to both eyes 2 (Two) Times a Day.       valsartan (DIOVAN) 40 MG tablet Take 1 tablet by mouth Daily.       zinc sulfate (ZINCATE) 50 MG capsule Take 1 capsule by mouth Daily.          Medicines:  Current Facility-Administered Medications   Medication Dose Route Frequency Provider Last Rate Last Admin    acetaminophen (TYLENOL) tablet 650 mg  650 mg Oral Q4H PRN Raymond Mederos MD   650 mg at 04/10/24 2141    Or    acetaminophen (TYLENOL) 160 MG/5ML oral solution 650 mg  650 mg Oral Q4H PRN Raymond Mederos MD   650 mg at 03/27/24 2109    Or    acetaminophen (TYLENOL) suppository 650 mg  650 mg Rectal Q4H PRN Raymond Mederos MD        ascorbic acid (VITAMIN C) tablet 1,000 mg  1,000 mg Oral Daily Raymond Mederos MD   1,000 mg at 04/10/24 0828    sennosides-docusate (PERICOLACE) 8.6-50 MG per tablet 1 tablet  1 tablet Oral BID Raymond Mederos MD   1 tablet at  04/09/24 2107    And    polyethylene glycol (MIRALAX) packet 17 g  17 g Oral Daily Raymond Mederos MD   17 g at 04/06/24 0932    And    bisacodyl (DULCOLAX) EC tablet 5 mg  5 mg Oral Daily PRN Raymond Mederos MD        And    bisacodyl (DULCOLAX) suppository 10 mg  10 mg Rectal Daily PRN Raymond Mederos MD        bumetanide (BUMEX) tablet 2 mg  2 mg Oral Daily Vinny Hartley MD   2 mg at 04/10/24 0827    carvedilol (COREG) tablet 3.125 mg  3.125 mg Oral BID With Meals Rene Maloney MD   3.125 mg at 04/10/24 1854    dextrose (D50W) (25 g/50 mL) IV injection 25 g  25 g Intravenous Q15 Min PRN Raymond Mederos MD        dextrose (GLUTOSE) oral gel 15 g  15 g Oral Q15 Min PRN Raymond Mederos MD   15 g at 03/27/24 1710    Diclofenac Sodium (VOLTAREN) 1 % gel 2 g  2 g Topical 4x Daily PRN Raymond Mederos MD        dicyclomine (BENTYL) capsule 20 mg  20 mg Oral TID PRN Raymond Mederos MD   20 mg at 04/11/24 0004    donepezil (ARICEPT) tablet 10 mg  10 mg Oral Daily Raymond Mederos MD   10 mg at 04/10/24 0827    [Transfer Hold] glucagon (GLUCAGEN) injection 1 mg  1 mg Intramuscular Q15 Min PRN Raquel Duncan, APRN        hydrocortisone (ANUSOL-HC) suppository 25 mg  25 mg Rectal BID Raymond Mederos MD   25 mg at 04/09/24 0828    insulin detemir (LEVEMIR) injection 30 Units  30 Units Subcutaneous Nightly Raymond Mederos MD   15 Units at 04/09/24 2117    Insulin Lispro (humaLOG) injection 4-24 Units  4-24 Units Subcutaneous 4x Daily AC & at Bedtime Raymond Mederos MD   4 Units at 04/10/24 2136    insulin regular (humuLIN R,novoLIN R) injection 10 Units  10 Units Subcutaneous TID AC Raymond Mederos MD   10 Units at 04/11/24 0826    isosorbide mononitrate (IMDUR) 24 hr tablet 30 mg  30 mg Oral Q24H Raymond Mederos MD   30 mg at 04/10/24 0829    lactulose solution 20 g  20 g Oral TID Raymond Mederos MD   20 g at 04/09/24 2107    linezolid (ZYVOX) tablet 600 mg  600 mg Oral Q12H Raymond Mederos MD   600 mg at  04/10/24 2137    magnesium hydroxide (MILK OF MAGNESIA) suspension 10 mL  10 mL Oral Daily PRN Raymond Mederos MD   10 mL at 04/03/24 0414    melatonin tablet 6 mg  6 mg Oral Nightly Raymond Mederos MD   6 mg at 04/10/24 2137    midodrine (PROAMATINE) tablet 2.5 mg  2.5 mg Oral TID AC Raymond Mederos MD   2.5 mg at 04/10/24 0828    mupirocin (BACTROBAN) 2 % nasal ointment 1 Application  1 Application Each Nare BID Raymond Mederos MD   1 Application at 04/10/24 2136    nitroglycerin (NITROSTAT) SL tablet 0.4 mg  0.4 mg Sublingual Q5 Min PRN Raymond Mederos MD        ondansetron ODT (ZOFRAN-ODT) disintegrating tablet 4 mg  4 mg Oral Q6H PRN Raymond Mederos MD   4 mg at 04/11/24 0004    Or    ondansetron (ZOFRAN) injection 4 mg  4 mg Intravenous Q6H PRN Raymond Mederos MD   4 mg at 03/29/24 1837    oxyCODONE (ROXICODONE) immediate release tablet 10 mg  10 mg Oral BID PRN Raymond Mederos MD   10 mg at 04/11/24 0323    pantoprazole (PROTONIX) EC tablet 40 mg  40 mg Oral BID AC Rene Maloney MD   40 mg at 04/10/24 1654    pregabalin (LYRICA) capsule 100 mg  100 mg Oral Nightly Rene Maloney MD   100 mg at 04/10/24 2137    pregabalin (LYRICA) capsule 50 mg  50 mg Oral Daily Rene Maloney MD   50 mg at 04/10/24 0828    rosuvastatin (CRESTOR) tablet 10 mg  10 mg Oral Nightly Raymond Mederos MD   10 mg at 04/10/24 2137    sodium chloride 0.9 % flush 10 mL  10 mL Intravenous PRN Raymond Mederos MD        sodium chloride 0.9 % flush 10 mL  10 mL Intravenous Q12H Raymond Mederos MD   10 mL at 04/07/24 2050    sodium chloride 0.9 % flush 10 mL  10 mL Intravenous PRN Raymond Mederos MD        sodium chloride 0.9 % flush 10 mL  10 mL Intravenous Q12H Tj Hardwick CRNA        sodium chloride 0.9 % flush 10 mL  10 mL Intravenous PRN Tj Hardwick CRNA        sodium chloride 0.9 % infusion 40 mL  40 mL Intravenous PRN Raymond Mederos MD        sodium chloride 0.9 % infusion 40 mL  40 mL Intravenous PRN Woods,  YAIR Spencer        sodium chloride 0.9 % infusion  100 mL/hr Intravenous Continuous Tj Hardwick CRNA        sucralfate (CARAFATE) 1 GM/10ML suspension 1 g  1 g Oral TID AC Raymond Mederos MD   1 g at 04/10/24 1654    tamsulosin (FLOMAX) 24 hr capsule 0.4 mg  0.4 mg Oral Daily Raymond Mederos MD   0.4 mg at 04/10/24 0828    timolol (TIMOPTIC) 0.25 % ophthalmic solution 1 drop  1 drop Both Eyes Q12H Raymond Mederos MD   1 drop at 04/10/24 2138    zinc sulfate (ZINCATE) capsule 220 mg  220 mg Oral Daily Raymond Mederos MD   220 mg at 04/10/24 0827       Past Medical History:  Past Medical History:   Diagnosis Date    Arthritis     Autonomic disease     CAD (coronary artery disease) 02/06/2017    Cervical radiculopathy 09/16/2021    Chronic constipation with acute exaccerbation 05/10/2021    Coronary artery disease     Degeneration of cervical intervertebral disc 08/11/2021    Diabetes mellitus     Diabetic foot ulcer 08/31/2020    Diabetic polyneuropathy associated with type 2 diabetes mellitus 01/18/2021    Elevated cholesterol     Gastroesophageal reflux disease 05/13/2019    Headache     HTN (hypertension), benign 05/03/2017    Hyperlipidemia     Hypertension     Mixed hyperlipidemia 02/07/2017    Multiple lung nodules 01/26/2020    5mm, 9 mm RLL identified 1/2020, not present 10/2019.    Myocardial infarction     Osteomyelitis 01/22/2020    Osteomyelitis of fifth toe of right foot 10/07/2019    Pancreatitis     Persistent insomnia 01/20/2020    Renal disorder     Sleep apnea 02/06/2017    Sleep apnea with use of continuous positive airway pressure (CPAP)     NON-COMPLIANT    Slow transit constipation 01/16/2019    Spinal stenosis in cervical region 09/16/2021    Vitamin D deficiency 03/02/2021       Past Surgical History:  Past Surgical History:   Procedure Laterality Date    ABDOMINAL SURGERY      AMPUTATION FOOT / TOE Left 10/2021    5th digit     ANTERIOR CERVICAL DISCECTOMY W/ FUSION N/A 8/5/2022     Procedure: CERVICAL DISCECTOMY ANTERIOR WITH FUSION C5-6 with possible plating of C5-7 with neuromonitoring and 1 c-arm;  Surgeon: Karel Soliz MD;  Location:  [facility] OR;  Service: Neurosurgery;  Laterality: N/A;    APPENDECTOMY      BACK SURGERY      CARDIAC CATHETERIZATION Left 2021    Procedure: Left Heart Cath w poss intervention left anatomical snuff box acess;  Surgeon: Omkar Charles DO;  Location:  [facility] CATH INVASIVE LOCATION;  Service: Cardiology;  Laterality: Left;    CARDIAC SURGERY      CATARACT EXTRACTION      CERVICAL SPINE SURGERY      COLONOSCOPY N/A 2017    Normal exam repeat in 5 years    COLONOSCOPY N/A 2019    Mild acute inflammation    COLONOSCOPY N/A 2024    Procedure: COLONOSCOPY WITH ANESTHESIA;  Surgeon: Raymond Mederos MD;  Location:  [facility] OR;  Service: Gastroenterology;  Laterality: N/A;  Pre: Anemia, Rectal bleed;  Post: Visible vessel at 20cm, clip x6 placed;  Del Shetty MD    COLONOSCOPY W/ POLYPECTOMY  2014    Hyperplastic polyp    CORONARY ARTERY BYPASS GRAFT  10/2015    ENDOSCOPY  2011    Gastritis with hemorrhage    ENDOSCOPY N/A 2017    Normal exam    ENDOSCOPY N/A 2019    Gastritis    ENDOSCOPY N/A 2020    Non-erosive gastritis with hemorrhage    ENDOSCOPY N/A 02/10/2021    Esophagitis    FOOT SURGERY Left     INCISION AND DRAINAGE OF WOUND Left 2007    spider bite       Family History  Family History   Problem Relation Age of Onset    Colon cancer Father     Heart disease Father     Colon cancer Sister     Colon polyps Sister     Alzheimer's disease Mother     Coronary artery disease Sister     Coronary artery disease Sister        Social History  Social History     Socioeconomic History    Marital status:    Tobacco Use    Smoking status: Former     Current packs/day: 0.00     Types: Cigarettes     Quit date:      Years since quittin.2    Smokeless tobacco: Never    Tobacco comments:      smoked in highschool   Vaping Use    Vaping status: Never Used   Substance and Sexual Activity    Alcohol use: No    Drug use: No    Sexual activity: Defer       Review of Systems:  History obtained from chart review and the patient  General ROS: No fever or chills  Respiratory ROS: No cough, shortness of breath, wheezing  Cardiovascular ROS: No chest pain or palpitations  Gastrointestinal ROS: No abdominal pain or melena  Genito-Urinary ROS: No dysuria or hematuria  Psych ROS: No anxiety and depression  14 point ROS reviewed with the patient and negative except as noted above and in the HPI unless unable to obtain.    Objective:  Patient Vitals for the past 24 hrs:   BP Temp Temp src Pulse Resp SpO2   04/11/24 0740 127/45 97.4 °F (36.3 °C) Axillary 62 16 95 %   04/11/24 0330 125/52 97.7 °F (36.5 °C) Oral 61 16 98 %   04/10/24 2051 123/56 97.7 °F (36.5 °C) Oral 59 16 99 %   04/10/24 1535 129/55 97.7 °F (36.5 °C) -- 55 16 97 %   04/10/24 1138 113/47 97.6 °F (36.4 °C) -- 75 16 93 %       Intake/Output Summary (Last 24 hours) at 4/11/2024 0917  Last data filed at 4/11/2024 0319  Gross per 24 hour   Intake 240 ml   Output 800 ml   Net -560 ml     General: awake/alert   Chest:  clear to auscultation bilaterally without respiratory distress  CVS: regular rate and rhythm  Abdominal: soft, nontender, positive bowel sounds  Extremities:  bilateral 1+ edema  Skin: warm and dry without rash      Labs:  Results from last 7 days   Lab Units 04/11/24  0618 04/11/24  0008 04/10/24  1742 04/10/24  1300 04/10/24  0552 04/09/24  1237 04/09/24  0611   WBC 10*3/mm3 6.33  --   --   --  4.60  --  4.35   HEMOGLOBIN g/dL 8.4* 8.9* 8.7*   < > 8.2*   < > 9.2*   HEMATOCRIT % 26.5* 28.2* 26.7*   < > 26.3*   < > 29.0*   PLATELETS 10*3/mm3 145  --   --   --  173  --  231    < > = values in this interval not displayed.         Results from last 7 days   Lab Units 04/11/24  0618 04/10/24  0553 04/09/24  0610 04/08/24  0623 04/06/24  0340  04/05/24  0512   SODIUM mmol/L 141 141 141 141   < > 139  139   POTASSIUM mmol/L 4.2 4.1 4.1 4.0   < > 4.4  4.4   CHLORIDE mmol/L 104 105 104 104   < > 102  102   CO2 mmol/L 27.0 26.0 26.0 25.0   < > 25.0  25.0   BUN mg/dL 31* 27* 28* 30*   < > 31*  31*   CREATININE mg/dL 2.02* 1.85* 1.84* 1.61*   < > 1.98*  1.98*   CALCIUM mg/dL 9.1 9.2 9.4 9.1   < > 11.1*  11.1*   EGFR mL/min/1.73 35.5* 39.4* 39.7* 46.6*   < > 36.3*  36.3*   BILIRUBIN mg/dL  --   --   --  0.4  --  0.4   ALK PHOS U/L  --   --   --  107  --  138*   ALT (SGPT) U/L  --   --   --  14  --  18   AST (SGOT) U/L  --   --   --  16  --  22   GLUCOSE mg/dL 174* 124* 170* 82   < > 173*  173*    < > = values in this interval not displayed.       Radiology:   Imaging Results (Last 72 Hours)       ** No results found for the last 72 hours. **            Culture:  Blood Culture   Date Value Ref Range Status   03/26/2024 Staphylococcus aureus, MRSA (C)  Final     Comment:       Infectious disease consultation is highly recommended to rule out distant foci of infection.  Methicillin resistant Staphylococcus aureus, Patient may be an isolation risk.   03/26/2024 Staphylococcus aureus, MRSA (C)  Final     Comment:       Infectious disease consultation is highly recommended to rule out distant foci of infection.  Methicillin resistant Staphylococcus aureus, Patient may be an isolation risk.         Assessment    Acute kidney injury, ATN--resolved  Baseline chronic kidney disease stage 3b  Type 2 diabetes, poorly controlled (A1c 10.8%)  Hypertension   Metabolic encephalopathy--improved  Anemia of CKD  Hypokalemia--improved  Sepsis due to infection of left foot wound  Hypercalcemia--improved    Plan:  Renal function about stable  Planning for EGD today  Monitor labs      SUSAN Calvert  4/11/2024  09:17 CDT      Electronically signed by Stewart Alvarez APRN at 04/11/24 0918       Julia Adrian MD at 04/10/24 1333          INFECTIOUS  "DISEASES PROGRESS NOTE    Patient:  Erick Luong  YOB: 1955  MRN: [id number]   Admit date: 3/26/2024   Admitting Physician: Rene Maloney MD  Primary Care Physician: Del Shetty MD    Chief Complaint: Nausea.    Interval History: Patient had some nausea today but reportedly ate most of his lunch.  He said he did have some emesis yesterday.  He says he occasionally does that at home and is told his stomach does not empty well.    Allergies:   Allergies   Allergen Reactions    Cefepime Hives and Anaphylaxis    Bactrim [Sulfamethoxazole-Trimethoprim] Other (See Comments)     \"RENAL FAILURE\"    Vancomycin Itching    Zolpidem Unknown - High Severity     \"makes him crazy\"    Zolpidem Tartrate Unknown - Low Severity and Provider Review Needed    Metronidazole Rash       Current Scheduled Medications:   ascorbic acid, 1,000 mg, Oral, Daily  bumetanide, 2 mg, Oral, Daily  carvedilol, 3.125 mg, Oral, BID With Meals  donepezil, 10 mg, Oral, Daily  hydrocortisone, 25 mg, Rectal, BID  insulin detemir, 30 Units, Subcutaneous, Nightly  insulin lispro, 4-24 Units, Subcutaneous, 4x Daily AC & at Bedtime  insulin regular, 10 Units, Subcutaneous, TID AC  isosorbide mononitrate, 30 mg, Oral, Q24H  lactulose, 20 g, Oral, TID  linezolid, 600 mg, Oral, Q12H  melatonin, 6 mg, Oral, Nightly  midodrine, 2.5 mg, Oral, TID AC  mupirocin, 1 Application, Each Nare, BID  pantoprazole, 40 mg, Oral, BID AC  senna-docusate sodium, 1 tablet, Oral, BID   And  polyethylene glycol, 17 g, Oral, Daily  pregabalin, 100 mg, Oral, Nightly  pregabalin, 50 mg, Oral, Daily  rosuvastatin, 10 mg, Oral, Nightly  sodium chloride, 10 mL, Intravenous, Q12H  sodium chloride, 10 mL, Intravenous, Q12H  sucralfate, 1 g, Oral, TID AC  tamsulosin, 0.4 mg, Oral, Daily  timolol, 1 drop, Both Eyes, Q12H  zinc sulfate, 220 mg, Oral, Daily      Current PRN Medications:    acetaminophen **OR** acetaminophen **OR** acetaminophen    senna-docusate " "sodium **AND** polyethylene glycol **AND** bisacodyl **AND** bisacodyl    dextrose    dextrose    Diclofenac Sodium    dicyclomine    [Transfer Hold] glucagon (human recombinant)    magnesium hydroxide    nitroglycerin    ondansetron ODT **OR** ondansetron    oxyCODONE    sodium chloride    sodium chloride    sodium chloride    sodium chloride    sodium chloride    sodium chloride, 100 mL/hr           Objective     Vital Signs:  Temp (24hrs), Av.6 °F (36.4 °C), Min:97.4 °F (36.3 °C), Max:98.2 °F (36.8 °C)      /47 (BP Location: Right arm, Patient Position: Sitting)   Pulse 75   Temp 97.6 °F (36.4 °C)   Resp 16   Ht 182.9 cm (72\")   Wt 131 kg (289 lb 3.2 oz)   SpO2 93%   BMI 39.22 kg/m²         Physical Exam:    General: The patient is sitting up in the recliner in no acute distress.  Respiratory: Effort even and unlabored, is not conversationally dyspneic  Abdomen: Soft, no rebound or guarding  Left foot dressing clean dry and intact          esults Review:    I reviewed the patient's new clinical results.    Lab Results:    CBC:   Lab Results   Lab 24  0514 24  0512 24  0340 24  1344 24  0422 24  1429 24  0623 24  1218 24  0611 24  1237 04/10/24  0004 04/10/24  0552 04/10/24  1300   WBC 5.64 6.93 5.43  --  5.70  --  6.19  --  4.35  --   --  4.60  --    HEMOGLOBIN 10.0* 10.9* 9.5*   < > 9.2*   < > 8.7*   < > 9.2*   < > 8.0* 8.2* 8.5*   HEMATOCRIT 31.3* 34.5* 30.2*   < > 29.5*   < > 27.0*   < > 29.0*   < > 25.6* 26.3* 25.9*   PLATELETS 251 321 294  --  275  --  246  --  231  --   --  173  --     < > = values in this interval not displayed.        AutoDiff:   Lab Results   Lab 24  0623 24  0611 04/10/24  0552   NEUTROPHIL % 65.7 45.1 50.9   LYMPHOCYTE % 22.1 40.0 35.4   MONOCYTES % 8.7 9.9 9.1   EOSINOPHIL % 2.6 4.1 3.7   BASOPHIL % 0.6 0.7 0.7   NEUTROS ABS 4.06 1.96 2.34   LYMPHS ABS 1.37 1.74 1.63   MONOS ABS 0.54 0.43 0.42 "   EOS ABS 0.16 0.18 0.17   BASOS ABS 0.04 0.03 0.03        Manual Diff:    Lab Results   Lab 04/08/24  0623 04/09/24  0611 04/10/24  0552   NEUTROS ABS 4.06 1.96 2.34           CMP:   Lab Results   Lab 04/05/24  0512 04/06/24  0340 04/08/24  0623 04/09/24  0610 04/10/24  0553   SODIUM 139  139   < > 141 141 141   POTASSIUM 4.4  4.4   < > 4.0 4.1 4.1   CHLORIDE 102  102   < > 104 104 105   CO2 25.0  25.0   < > 25.0 26.0 26.0   BUN 31*  31*   < > 30* 28* 27*   CREATININE 1.98*  1.98*   < > 1.61* 1.84* 1.85*   CALCIUM 11.1*  11.1*   < > 9.1 9.4 9.2   BILIRUBIN 0.4  --  0.4  --   --    ALK PHOS 138*  --  107  --   --    ALT (SGPT) 18  --  14  --   --    AST (SGOT) 22  --  16  --   --    GLUCOSE 173*  173*   < > 82 170* 124*    < > = values in this interval not displayed.       Estimated Creatinine Clearance: 54.3 mL/min (A) (by C-G formula based on SCr of 1.85 mg/dL (H)).    Culture Results:    Microbiology Results (last 10 days)       Procedure Component Value - Date/Time    Blood Culture With DARSHAN - Blood, Hand, Right [[id number]]  (Normal) Collected: 04/01/24 0402    Lab Status: Final result Specimen: Blood from Hand, Right Updated: 04/06/24 0445     Blood Culture No growth at 5 days               Radiology:       Imaging Results (Last 72 Hours)       ** No results found for the last 72 hours. **                Active Hospital Problems    Diagnosis     **Sepsis     Diabetic foot infection     Chronic kidney disease (CKD), stage IV (severe)     Chronic diastolic heart failure     BPH without obstruction/lower urinary tract symptoms     Diabetic polyneuropathy associated with type 2 diabetes mellitus     Anemia due to chronic kidney disease     Essential hypertension     Type 2 diabetes mellitus with hyperglycemia, with long-term current use of insulin        IMPRESSION:    MRSA bacteremia-blood cultures were + March 23 and March 29.  Source was felt to be infected left foot with wounds and associated cellulitis  on admission.  Blood cultures negative as of March 30.  Today is day #12/14 of antibiotic therapy with linezolid since first negative blood culture  Chronic kidney disease stage IIIb with acute kidney injury-nephrology following.  Back to baseline  Type 2 diabetes mellitus-poorly controlled with hemoglobin A1c of 10.7 this admission  Bright red blood per rectum-colonoscopy April 7 with sigmoid bleeding that was clipped.  MRSA nasal screen positive-treated with intranasal mupirocin      RECOMMENDATION:   Continue linezolid p.o.  Hemoglobin monitoring per attending/GI-noted recommendations for fully prepped colonoscopy as outpatient.  Wound care per Dr. Cerrato's orders  Glucose management per attending  Fluid/diuretic management per nephrology.          Julia Adrian MD  04/10/24  13:33 CDT        Electronically signed by Julia Adrian MD at 04/10/24 1352       Rene Maloney MD at 04/10/24 1220              [facility]  INPATIENT PROGRESS NOTE    Patient Name: Erick Luong  Date of Admission: 3/26/2024  Today's Date: 04/10/24  Length of Stay: 15  Primary Care Physician: Del Shetty MD    Subjective   Chief Complaint: Altered mental status    HPI:  4/10  Patient's hemoglobin has drifted down to 2 8.2 G/DL today.  Continues to have epigastric pain and nausea today.  However no vomiting and he was able to tolerate breakfast.    Review of Systems   All pertinent negatives and positives are as above. All other systems have been reviewed and are negative unless otherwise stated.     Objective    Temp:  [97.3 °F (36.3 °C)-98.2 °F (36.8 °C)] 97.6 °F (36.4 °C)  Heart Rate:  [59-78] 75  Resp:  [16-18] 16  BP: (109-133)/(42-85) 113/47  Physical Exam  Constitutional:       General: He is not in acute distress.     Appearance: He is well-developed. He is not diaphoretic.   HENT:      Head: Normocephalic.   Eyes:      General: No scleral icterus.      Conjunctiva/sclera: Conjunctivae normal.      Pupils: Pupils are equal, round, and reactive to light.   Neck:      Thyroid: No thyromegaly.      Vascular: No JVD.      Trachea: No tracheal deviation.   Cardiovascular:      Rate and Rhythm: Normal rate and regular rhythm.      Heart sounds: Normal heart sounds. No murmur heard.     No friction rub. No gallop.   Pulmonary:      Effort: Pulmonary effort is normal. No respiratory distress.      Breath sounds: Normal breath sounds. No stridor. No wheezing or rales.   Chest:      Chest wall: No tenderness.   Abdominal:      General: Bowel sounds are normal. There is no distension.      Palpations: Abdomen is soft. There is no mass.      Tenderness: There is abdominal tenderness. There is no guarding or rebound.      Hernia: No hernia is present.   Musculoskeletal:         General: Swelling and signs of injury present. No tenderness or deformity. Normal range of motion.      Cervical back: Normal range of motion and neck supple.      Right lower leg: No edema.      Comments: Dressing over left foot noted.   Lymphadenopathy:      Cervical: No cervical adenopathy.   Skin:     General: Skin is warm and dry.      Coloration: Skin is not pale.      Findings: No erythema or rash.   Neurological:      General: No focal deficit present.      Mental Status: He is alert and oriented to person, place, and time.      Cranial Nerves: No cranial nerve deficit.      Sensory: No sensory deficit.      Motor: No abnormal muscle tone.      Coordination: Coordination normal.   Psychiatric:         Mood and Affect: Mood normal.         Behavior: Behavior normal.       Results Review:  I have reviewed the labs, radiology results, and diagnostic studies.    Laboratory Data:   Results from last 7 days   Lab Units 04/10/24  0552 04/10/24  0004 04/09/24  1747 04/09/24  1237 04/09/24  0611 04/08/24  1218 04/08/24  0623   WBC 10*3/mm3 4.60  --   --   --  4.35  --  6.19   HEMOGLOBIN g/dL 8.2* 8.0*  8.7*   < > 9.2*   < > 8.7*   HEMATOCRIT % 26.3* 25.6* 26.5*   < > 29.0*   < > 27.0*   PLATELETS 10*3/mm3 173  --   --   --  231  --  246    < > = values in this interval not displayed.        Results from last 7 days   Lab Units 04/10/24  0553 04/09/24  0610 04/08/24  0623 04/06/24  0340 04/05/24  0512   SODIUM mmol/L 141 141 141   < > 139  139   POTASSIUM mmol/L 4.1 4.1 4.0   < > 4.4  4.4   CHLORIDE mmol/L 105 104 104   < > 102  102   CO2 mmol/L 26.0 26.0 25.0   < > 25.0  25.0   BUN mg/dL 27* 28* 30*   < > 31*  31*   CREATININE mg/dL 1.85* 1.84* 1.61*   < > 1.98*  1.98*   CALCIUM mg/dL 9.2 9.4 9.1   < > 11.1*  11.1*   BILIRUBIN mg/dL  --   --  0.4  --  0.4   ALK PHOS U/L  --   --  107  --  138*   ALT (SGPT) U/L  --   --  14  --  18   AST (SGOT) U/L  --   --  16  --  22   GLUCOSE mg/dL 124* 170* 82   < > 173*  173*    < > = values in this interval not displayed.       Culture Data:   Blood Culture   Date Value Ref Range Status   03/26/2024 Abnormal Stain (C)  Preliminary   03/26/2024 Abnormal Stain (C)  Preliminary       Radiology Data:   Imaging Results (Last 24 Hours)       ** No results found for the last 24 hours. **            I have reviewed the patient's current medications.     Assessment/Plan   Assessment  Active Hospital Problems    Diagnosis     **Sepsis     Diabetic foot infection     Chronic kidney disease (CKD), stage IV (severe)     Chronic diastolic heart failure     BPH without obstruction/lower urinary tract symptoms     Diabetic polyneuropathy associated with type 2 diabetes mellitus     Anemia due to chronic kidney disease     Essential hypertension     Type 2 diabetes mellitus with hyperglycemia, with long-term current use of insulin    MRSA bacteremia  Metabolic encephalopathy secondary to sepsis  WANDER on CKD    Treatment Plan  Continue p.o. Zyvox.  Infectious disease input appreciated.      Nephrology input appreciated.  Creatinine appears to be at baseline.  Continue p.o. Bumex 2 mg  daily.    Patient continues to have epigastric and periumbilical pain and nausea.  No overt signs of bleeding, however hemoglobin is drifted down to 8.2G/DL today.  The patient does have a history of esophagitis in the past on previous upper GI endoscopies.  He is currently on sucralfate as well as p.o. Protonix 40 mg every 12 hours.  I discussed with gastroenterologist and we will plan an EGD tomorrow morning.  N.p.o. for midnight.    Of note, patient had colonoscopy which was limited due to poor prep.  However, a bleeding area in the sigmoid colon was clipped. Will discontinue aspirin and Eliquis. Monitor hemoglobin closely every 6 hours.  Transfuse if hemoglobin less than 7G/DL    Podiatry consultation input appreciated.  Will follow recommendations.  Continue wound dressings as per podiatry.    Urology input appreciated.  No signs of urinary retention at this time.  Continue to monitor.    Pain control with opiate pain medications.    Insulin sliding scale and Levemir for type 2 diabetes mellitus    PT/OT/wound care consultations    DVT prophylaxis with SCDs    CODE STATUS is full code    Medical Decision Making  Number and Complexity of problems: 4 acute, severe, high complexity medical problems.  Multiple chronic medical problems.  Differential Diagnosis: None    Conditions and Status        Condition is worsening.     MDM Data  External documents reviewed: None  Cardiac tracing (EKG, telemetry) interpretation: None  Radiology interpretation: None  Labs reviewed: CBC, BMP, blood cultures reviewed by me  Any tests that were considered but not ordered: None     Decision rules/scores evaluated (example [id number], Wells, etc): N/A     Discussed with: Patient     Care Planning  Shared decision making: Patient and his wife  Code status and discussions: CODE STATUS is full code    Disposition  Social Determinants of Health that impact treatment or disposition: None  I expect the patient to be discharged to home in  the next 1 to 2 days    Electronically signed by Rene Maloney MD, 04/10/24, 12:21 CDT.     Electronically signed by Rene Maloney MD at 04/10/24 1225       Stewart Alvarez APRN at 04/10/24 1114       Attestation signed by Vinny Hartley MD at 04/10/24 1150    I have reviewed this documentation and agree.                  Nephrology ([facility]) Progress Note      Patient:  Erick Luong  YOB: 1955  Date of Service: 4/10/2024  MRN: [id number]   Acct: [id number]   Primary Care Physician: Del Shetty MD  Advance Directive:   Code Status and Medical Interventions:   Ordered at: 03/26/24 1815     Level Of Support Discussed With:    Health Care Surrogate     Code Status (Patient has no pulse and is not breathing):    CPR (Attempt to Resuscitate)     Medical Interventions (Patient has pulse or is breathing):    Full Support     Admit Date: 3/26/2024       Hospital Day: 15  Referring Provider: No ref. provider found      Patient personally seen and examined.  Complete chart including Consults, Notes, Operative Reports, Labs, Cardiology, and Radiology studies reviewed as able.        Subjective:  Erick Luong is a 68 y.o. male for whom we were consulted for evaluation and treatment of acute kidney injury.  He has stage IIIb chronic kidney disease baseline, follows with Dr Lomas in our office.  Baseline creatinine approximately 1.8. He has history of insulin-dependent type 2 diabetes, hypertension, abdominal obesity, obstructive sleep apnea, diabetic foot ulcer and coronary artery disease.   Patient presented to ER on 3/26 with altered mental status. Had debridement of ongoing wound on left foot the day prior. Left foot warm and erythremic on arrival to ER. Noted to have elevated blood glucose over 400. Initial creatinine of 1.9.  Admitted to medical floor for sepsis due to left foot wound. Patient has been agitated and confused during the admission,  requiring wrist restraints due to pulling at lines and tubes. Renal function and mentation have been improving. Now has a sitter present in room due to repeatedly getting up unattended. He is s/p colonoscopy on 4/07 due to rectal bleding.     Today is awake and alert. No new complaints or overnight issues. Urine output nonoliguric     Allergies:  Cefepime, Bactrim [sulfamethoxazole-trimethoprim], Vancomycin, Zolpidem, Zolpidem tartrate, and Metronidazole    Home Meds:  Medications Prior to Admission   Medication Sig Dispense Refill Last Dose    amitriptyline (ELAVIL) 25 MG tablet Take 2 tablets by mouth Daily at bedtime. (Patient not taking: Reported on 3/27/2024) 60 tablet 1 Not Taking    apixaban (ELIQUIS) 5 MG tablet tablet Take 1 tablet by mouth Every 12 (Twelve) Hours.       ascorbic acid (VITAMIN C) 1000 MG tablet Take 1 tablet by mouth Daily. 30 tablet 3     Aspirin 81 MG capsule Take 81 mg by mouth Daily.       bumetanide (BUMEX) 1 MG tablet Take 1 tablet every day by oral route for 30 days. 30 tablet 0     bumetanide (BUMEX) 2 MG tablet Take 1 tablet by mouth Daily. Take 2 tablets in morning;1 tablet at night (Patient not taking: Reported on 3/27/2024)   Not Taking    busPIRone (BUSPAR) 10 MG tablet Take 1 tablet by mouth 3 (Three) Times a Day.       calcitriol (ROCALTROL) 0.5 MCG capsule Take 1 capsule by mouth Daily. 90 capsule 4     calcitriol (ROCALTROL) 0.5 MCG capsule Take 1 capsule every day by oral route for 90 days. (Patient not taking: Reported on 3/27/2024) 90 capsule 4 Not Taking    carvedilol (COREG) 3.125 MG tablet Take 1 tablet twice a day by oral route. 60 tablet 4     carvedilol (COREG) 6.25 MG tablet Take 1 tablet by mouth 2 (Two) Times a Day. (Patient not taking: Reported on 3/27/2024) 180 tablet 4 Not Taking    Cholecalciferol (D-5000) 125 MCG (5000 UT) tablet Take 1 tablet by mouth Daily Before Lunch. 30 tablet 2     cloNIDine (CATAPRES) 0.1 MG tablet Take 1 tablet by mouth Every 12  (Twelve) Hours. (Patient not taking: Reported on 3/27/2024) 60 tablet 2 Not Taking    Diclofenac Sodium (VOLTAREN) 1 % gel gel Apply 2 g topically to the appropriate area as directed 4 (Four) Times a Day As Needed. 300 g 11     Diclofenac Sodium (VOLTAREN) 1 % gel gel Apply 2 g topically to the appropriate area as directed 4 (Four) Times a Day. (Patient not taking: Reported on 3/27/2024) 300 g 11 Not Taking    donepezil (ARICEPT) 10 MG tablet Take 1 tablet by mouth Daily. (Patient not taking: Reported on 3/27/2024) 90 tablet 4 Not Taking    Dulaglutide (Trulicity) 4.5 MG/0.5ML solution pen-injector Inject 0.5 mL under the skin into the appropriate area as directed 1 (One) Time Per Week. (Patient not taking: Reported on 3/27/2024) 2 mL 11 Not Taking    DULoxetine (CYMBALTA) 60 MG capsule Take 1 capsule by mouth Daily. 90 capsule 4     DULoxetine (CYMBALTA) 60 MG capsule Take 1 capsule by mouth Daily. (Patient not taking: Reported on 3/27/2024) 90 capsule 4 Not Taking    DULoxetine (CYMBALTA) 60 MG capsule Take 1 capsule by mouth Daily. (Patient not taking: Reported on 3/27/2024) 90 capsule 4 Not Taking    empagliflozin (JARDIANCE) 25 MG tablet tablet Take 1 tablet by mouth Daily. (Patient not taking: Reported on 3/27/2024) 30 tablet 2 Not Taking    famotidine (PEPCID) 20 MG tablet Take 1 tablet twice a day by oral route. 180 tablet 4     fluticasone (FLONASE) 50 MCG/ACT nasal spray 1 spray into the nostril(s) as directed by provider Daily. (Patient taking differently: 1 spray into the nostril(s) as directed by provider 2 (Two) Times a Day.) 16 g 1     HYDROcodone-acetaminophen (NORCO) 7.5-325 MG per tablet Take 1 tablet by mouth 2 (Two) Times a Day As Needed. (Patient not taking: Reported on 3/27/2024) 40 tablet 0 Not Taking    Insulin Glargine (Lantus SoloStar) 100 UNIT/ML injection pen Inject 20 Units under the skin into the appropriate area as directed Every Evening. 15 mL 3     Insulin Regular Human, Conc,  (HumuLIN R U-500 KwikPen) 500 UNIT/ML solution pen-injector CONCENTRATED injection Inject 120 Units under the skin into the appropriate area as directed 2 (Two) Times a Day with breakfast and dinner. (Patient not taking: Reported on 3/27/2024) 18 mL 5 Not Taking    Insulin Regular Human, Conc, (HumuLIN R U-500 KwikPen) 500 UNIT/ML solution pen-injector CONCENTRATED injection Inject 40 Units under the skin into the appropriate area with regular meals AND 40 Units with large meals. 54 mL 3     isosorbide mononitrate (IMDUR) 30 MG 24 hr tablet Take 1 tablet by mouth Daily.       magnesium hydroxide (Milk of Magnesia) 400 MG/5ML suspension Take 30 mL every day by oral route for 30 days. 900 mL 0     magnesium hydroxide (Milk of Magnesia) 400 MG/5ML suspension Take 30mL by mouth once daily for 30 days. (Patient not taking: Reported on 3/27/2024) 900 mL 0 Not Taking    melatonin 3 MG tablet Take 1 tablet by mouth At Night As Needed for Sleep.       methylcellulose (Citrucel) oral powder Mix 5g as directed and drink twice daily for 90 days. 900 g 10     metoclopramide (REGLAN) 5 MG tablet Take 1 tablet by mouth 3 times a day.       midodrine (PROAMATINE) 2.5 MG tablet Take 1 tablet by mouth 3 (Three) Times a Day Before Meals.       nitroglycerin (NITROSTAT) 0.4 MG SL tablet Dissolve 1 tablet by mouth every 5 minutes as needed for chest pain; MAX 3 tabs/24 hours.  If no improvement after 3 tabs go to ER 25 tablet 0     ondansetron (ZOFRAN) 4 MG tablet Take 1 tablet by mouth Every 6 (Six) Hours As Needed for Nausea or Vomiting.       oxyCODONE (ROXICODONE) 10 MG tablet Take 1 tablet by mouth twice a day as needed 60 tablet 0     pantoprazole (Protonix) 40 MG EC tablet Take 1 tablet by mouth 2 (Two) Times a Day. (Patient not taking: Reported on 3/27/2024) 180 tablet 4 Not Taking    polyethylene glycol (MIRALAX) 17 g packet Take 17 g by mouth Daily. Obtain OTC       prazosin (MINIPRESS) 1 MG capsule Take 1 capsule by mouth  every night at bedtime. 30 capsule 2     pregabalin (LYRICA) 100 MG capsule Take 2 capsules by mouth Every Night.       pregabalin (LYRICA) 50 MG capsule Take 1 capsule by mouth Daily.       rosuvastatin (CRESTOR) 10 MG tablet Take 1 tablet by mouth Daily.       sennosides-docusate (PERICOLACE) 8.6-50 MG per tablet Take 1 tablet by mouth Every Night. Obtain OTC       sennosides-docusate (PERICOLACE) 8.6-50 MG per tablet Take 1 tablet by mouth every night at bedtime. (Patient not taking: Reported on 3/27/2024) 30 tablet 2 Not Taking    sodium hypochlorite (DAKIN'S 1/4 STRENGTH) 0.125 % solution topical solution 0.125% Apply to affected area twice daily (Patient not taking: Reported on 3/27/2024) 473 mL 3 Not Taking    sodium hypochlorite (DAKIN'S 1/4 STRENGTH) 0.125 % solution topical solution 0.125% Apply to affected area twice daily as directed (Patient not taking: Reported on 3/27/2024) 473 mL 5 Not Taking    sodium hypochlorite (DAKIN'S) 0.25 % topical solution Use to wound daily as instructed 473 mL 1     sucralfate (CARAFATE) 1 g tablet Take 1 tablet by mouth 3 times a day.       tamsulosin (FLOMAX) 0.4 MG capsule 24 hr capsule Take 1 capsule by mouth Daily. 90 capsule 1     timolol (TIMOPTIC) 0.5 % ophthalmic solution Administer 1 drop to both eyes 2 (Two) Times a Day.       valsartan (DIOVAN) 40 MG tablet Take 1 tablet by mouth Daily.       zinc sulfate (ZINCATE) 50 MG capsule Take 1 capsule by mouth Daily.          Medicines:  Current Facility-Administered Medications   Medication Dose Route Frequency Provider Last Rate Last Admin    acetaminophen (TYLENOL) tablet 650 mg  650 mg Oral Q4H PRN Raymond Mederos MD   650 mg at 04/04/24 0002    Or    acetaminophen (TYLENOL) 160 MG/5ML oral solution 650 mg  650 mg Oral Q4H PRN Raymond Mederos MD   650 mg at 03/27/24 2109    Or    acetaminophen (TYLENOL) suppository 650 mg  650 mg Rectal Q4H PRN Raymond Mederos MD        ascorbic acid (VITAMIN C) tablet 1,000 mg   1,000 mg Oral Daily Raymond Mederos MD   1,000 mg at 04/10/24 0828    sennosides-docusate (PERICOLACE) 8.6-50 MG per tablet 1 tablet  1 tablet Oral BID Raymond Mederos MD   1 tablet at 04/09/24 2107    And    polyethylene glycol (MIRALAX) packet 17 g  17 g Oral Daily Raymond Mederos MD   17 g at 04/06/24 0932    And    bisacodyl (DULCOLAX) EC tablet 5 mg  5 mg Oral Daily PRN Raymond Mederos MD        And    bisacodyl (DULCOLAX) suppository 10 mg  10 mg Rectal Daily PRN Raymond Mederos MD        bumetanide (BUMEX) tablet 2 mg  2 mg Oral Daily Vinny Hartley MD   2 mg at 04/10/24 0827    carvedilol (COREG) tablet 3.125 mg  3.125 mg Oral BID With Meals Rene Maloney MD   3.125 mg at 04/10/24 0828    dextrose (D50W) (25 g/50 mL) IV injection 25 g  25 g Intravenous Q15 Min PRN Raymond Mederos MD        dextrose (GLUTOSE) oral gel 15 g  15 g Oral Q15 Min PRN Raymond Mederos MD   15 g at 03/27/24 1710    Diclofenac Sodium (VOLTAREN) 1 % gel 2 g  2 g Topical 4x Daily PRN Raymond Mederos MD        dicyclomine (BENTYL) capsule 20 mg  20 mg Oral TID PRN Raymond Mederos MD   20 mg at 04/07/24 2050    donepezil (ARICEPT) tablet 10 mg  10 mg Oral Daily Raymond Mederos MD   10 mg at 04/10/24 0827    [Transfer Hold] glucagon (GLUCAGEN) injection 1 mg  1 mg Intramuscular Q15 Min PRN Raquel Duncan, APRN        hydrocortisone (ANUSOL-HC) suppository 25 mg  25 mg Rectal BID Raymond Mederos MD   25 mg at 04/09/24 0828    insulin detemir (LEVEMIR) injection 30 Units  30 Units Subcutaneous Nightly Raymond Mederos MD   15 Units at 04/09/24 2117    Insulin Lispro (humaLOG) injection 4-24 Units  4-24 Units Subcutaneous 4x Daily AC & at Bedtime Raymond Mederos MD   4 Units at 04/09/24 1732    insulin regular (humuLIN R,novoLIN R) injection 10 Units  10 Units Subcutaneous TID AC Raymond Mederos MD   10 Units at 04/10/24 0756    isosorbide mononitrate (IMDUR) 24 hr tablet 30 mg  30 mg Oral Q24H Raymond Mederos MD   30 mg at  04/10/24 0829    lactulose solution 20 g  20 g Oral TID Raymond Mederos MD   20 g at 04/09/24 2107    linezolid (ZYVOX) tablet 600 mg  600 mg Oral Q12H Raymond Mederos MD   600 mg at 04/10/24 0828    magnesium hydroxide (MILK OF MAGNESIA) suspension 10 mL  10 mL Oral Daily PRN Raymond Mederos MD   10 mL at 04/03/24 0414    melatonin tablet 6 mg  6 mg Oral Nightly Raymond Mederos MD   6 mg at 04/09/24 2107    midodrine (PROAMATINE) tablet 2.5 mg  2.5 mg Oral TID AC Raymond Mederos MD   2.5 mg at 04/10/24 0828    mupirocin (BACTROBAN) 2 % nasal ointment 1 Application  1 Application Each Nare BID Raymond Mederos MD   1 Application at 04/10/24 0827    nitroglycerin (NITROSTAT) SL tablet 0.4 mg  0.4 mg Sublingual Q5 Min PRN Raymond Mederos MD        ondansetron ODT (ZOFRAN-ODT) disintegrating tablet 4 mg  4 mg Oral Q6H PRN Raymond Mederos MD   4 mg at 04/10/24 0834    Or    ondansetron (ZOFRAN) injection 4 mg  4 mg Intravenous Q6H PRN Raymond eMderos MD   4 mg at 03/29/24 1837    oxyCODONE (ROXICODONE) immediate release tablet 10 mg  10 mg Oral BID PRN Raymond Mederos MD   10 mg at 04/09/24 2205    pantoprazole (PROTONIX) EC tablet 40 mg  40 mg Oral BID AC Rene Maloney MD   40 mg at 04/10/24 0827    pregabalin (LYRICA) capsule 100 mg  100 mg Oral Nightly Rene Maloney MD   100 mg at 04/09/24 2106    pregabalin (LYRICA) capsule 50 mg  50 mg Oral Daily Rene Maloney MD   50 mg at 04/10/24 0828    rosuvastatin (CRESTOR) tablet 10 mg  10 mg Oral Nightly Raymond Mederos MD   10 mg at 04/09/24 2107    sodium chloride 0.9 % flush 10 mL  10 mL Intravenous PRN Raymond Mederos MD        sodium chloride 0.9 % flush 10 mL  10 mL Intravenous Q12H Raymond Mederos MD   10 mL at 04/07/24 2050    sodium chloride 0.9 % flush 10 mL  10 mL Intravenous PRN Raymond Mederos MD        sodium chloride 0.9 % flush 10 mL  10 mL Intravenous Q12H Tj Hardwick CRNA        sodium chloride 0.9 % flush 10 mL  10 mL Intravenous PRN  Tj Hardwick CRNA        sodium chloride 0.9 % infusion 40 mL  40 mL Intravenous PRN Raymond Mederos MD        sodium chloride 0.9 % infusion 40 mL  40 mL Intravenous PRN Tj Hardwick CRNA        sodium chloride 0.9 % infusion  100 mL/hr Intravenous Continuous Tj Hardwick CRNA        sucralfate (CARAFATE) 1 GM/10ML suspension 1 g  1 g Oral TID AC Raymond Mederos MD   1 g at 04/10/24 0827    tamsulosin (FLOMAX) 24 hr capsule 0.4 mg  0.4 mg Oral Daily Rayomnd Mederos MD   0.4 mg at 04/10/24 0828    timolol (TIMOPTIC) 0.25 % ophthalmic solution 1 drop  1 drop Both Eyes Q12H Raymond Mederos MD   1 drop at 04/10/24 0829    zinc sulfate (ZINCATE) capsule 220 mg  220 mg Oral Daily Raymond Mederos MD   220 mg at 04/10/24 0827       Past Medical History:  Past Medical History:   Diagnosis Date    Arthritis     Autonomic disease     CAD (coronary artery disease) 02/06/2017    Cervical radiculopathy 09/16/2021    Chronic constipation with acute exaccerbation 05/10/2021    Coronary artery disease     Degeneration of cervical intervertebral disc 08/11/2021    Diabetes mellitus     Diabetic foot ulcer 08/31/2020    Diabetic polyneuropathy associated with type 2 diabetes mellitus 01/18/2021    Elevated cholesterol     Gastroesophageal reflux disease 05/13/2019    Headache     HTN (hypertension), benign 05/03/2017    Hyperlipidemia     Hypertension     Mixed hyperlipidemia 02/07/2017    Multiple lung nodules 01/26/2020    5mm, 9 mm RLL identified 1/2020, not present 10/2019.    Myocardial infarction     Osteomyelitis 01/22/2020    Osteomyelitis of fifth toe of right foot 10/07/2019    Pancreatitis     Persistent insomnia 01/20/2020    Renal disorder     Sleep apnea 02/06/2017    Sleep apnea with use of continuous positive airway pressure (CPAP)     NON-COMPLIANT    Slow transit constipation 01/16/2019    Spinal stenosis in cervical region 09/16/2021    Vitamin D deficiency 03/02/2021       Past Surgical History:  Past  Surgical History:   Procedure Laterality Date    ABDOMINAL SURGERY      AMPUTATION FOOT / TOE Left 10/2021    5th digit     ANTERIOR CERVICAL DISCECTOMY W/ FUSION N/A 8/5/2022    Procedure: CERVICAL DISCECTOMY ANTERIOR WITH FUSION C5-6 with possible plating of C5-7 with neuromonitoring and 1 c-arm;  Surgeon: Karel Soliz MD;  Location:  [facility] OR;  Service: Neurosurgery;  Laterality: N/A;    APPENDECTOMY      BACK SURGERY      CARDIAC CATHETERIZATION Left 02/08/2021    Procedure: Left Heart Cath w poss intervention left anatomical snuff box acess;  Surgeon: Omkar Charles DO;  Location:  [facility] CATH [facility]VASIVE [facility]ATION;  Service: Cardiology;  Laterality: Left;    CARDIAC SURGERY      CATARACT EXTRACTION      CERVICAL SPINE SURGERY      COLONOSCOPY N/A 01/31/2017    Normal exam repeat in 5 years    COLONOSCOPY N/A 02/11/2019    Mild acute inflammation    COLONOSCOPY N/A 4/7/2024    Procedure: COLONOSCOPY WITH ANESTHESIA;  Surgeon: Raymond Mederos MD;  Location:  [facility] OR;  Service: Gastroenterology;  Laterality: N/A;  Pre: Anemia, Rectal bleed;  Post: Visible vessel at 20cm, clip x6 placed;  Del Shetty MD    COLONOSCOPY W/ POLYPECTOMY  03/04/2014    Hyperplastic polyp    CORONARY ARTERY BYPASS GRAFT  10/2015    ENDOSCOPY  04/13/2011    Gastritis with hemorrhage    ENDOSCOPY N/A 05/05/2017    Normal exam    ENDOSCOPY N/A 02/11/2019    Gastritis    ENDOSCOPY N/A 09/01/2020    Non-erosive gastritis with hemorrhage    ENDOSCOPY N/A 02/10/2021    Esophagitis    FOOT SURGERY Left     INCISION AND DRAINAGE OF WOUND Left 09/2007    spider bite       Family History  Family History   Problem Relation Age of Onset    Colon cancer Father     Heart disease Father     Colon cancer Sister     Colon polyps Sister     Alzheimer's disease Mother     Coronary artery disease Sister     Coronary artery disease Sister        Social History  Social History     Socioeconomic History    Marital status:    Tobacco  Use    Smoking status: Former     Current packs/day: 0.00     Types: Cigarettes     Quit date:      Years since quittin.2    Smokeless tobacco: Never    Tobacco comments:     smoked in highschool   Vaping Use    Vaping status: Never Used   Substance and Sexual Activity    Alcohol use: No    Drug use: No    Sexual activity: Defer       Review of Systems:  History obtained from chart review and the patient  General ROS: No fever or chills  Respiratory ROS: No cough, shortness of breath, wheezing  Cardiovascular ROS: No chest pain or palpitations  Gastrointestinal ROS: No abdominal pain or melena  Genito-Urinary ROS: No dysuria or hematuria  Psych ROS: No anxiety and depression  14 point ROS reviewed with the patient and negative except as noted above and in the HPI unless unable to obtain.    Objective:  Patient Vitals for the past 24 hrs:   BP Temp Temp src Pulse Resp SpO2   04/10/24 0805 109/42 97.6 °F (36.4 °C) -- 59 16 98 %   04/10/24 0418 110/50 97.6 °F (36.4 °C) Oral 61 16 98 %   04/10/24 0026 121/53 98.2 °F (36.8 °C) Oral 67 18 98 %   24 133/70 97.4 °F (36.3 °C) Oral 65 18 99 %   24 1549 115/46 97.4 °F (36.3 °C) Oral 69 18 100 %   24 1305 120/85 97.3 °F (36.3 °C) Oral 78 16 95 %       Intake/Output Summary (Last 24 hours) at 4/10/2024 1114  Last data filed at 4/10/2024 0926  Gross per 24 hour   Intake 240 ml   Output 1000 ml   Net -760 ml     General: awake/alert   Chest:  clear to auscultation bilaterally without respiratory distress  CVS: regular rate and rhythm  Abdominal: soft, nontender, positive bowel sounds  Extremities:  bilateral 1+ edema  Skin: warm and dry without rash      Labs:  Results from last 7 days   Lab Units 04/10/24  0552 04/10/24  0004 24  1747 24  1237 24  0611 24  1218 24  0623   WBC 10*3/mm3 4.60  --   --   --  4.35  --  6.19   HEMOGLOBIN g/dL 8.2* 8.0* 8.7*   < > 9.2*   < > 8.7*   HEMATOCRIT % 26.3* 25.6* 26.5*   < > 29.0*    < > 27.0*   PLATELETS 10*3/mm3 173  --   --   --  231  --  246    < > = values in this interval not displayed.         Results from last 7 days   Lab Units 04/10/24  0553 04/09/24  0610 04/08/24  0623 04/06/24  0340 04/05/24  0512   SODIUM mmol/L 141 141 141   < > 139  139   POTASSIUM mmol/L 4.1 4.1 4.0   < > 4.4  4.4   CHLORIDE mmol/L 105 104 104   < > 102  102   CO2 mmol/L 26.0 26.0 25.0   < > 25.0  25.0   BUN mg/dL 27* 28* 30*   < > 31*  31*   CREATININE mg/dL 1.85* 1.84* 1.61*   < > 1.98*  1.98*   CALCIUM mg/dL 9.2 9.4 9.1   < > 11.1*  11.1*   EGFR mL/min/1.73 39.4* 39.7* 46.6*   < > 36.3*  36.3*   BILIRUBIN mg/dL  --   --  0.4  --  0.4   ALK PHOS U/L  --   --  107  --  138*   ALT (SGPT) U/L  --   --  14  --  18   AST (SGOT) U/L  --   --  16  --  22   GLUCOSE mg/dL 124* 170* 82   < > 173*  173*    < > = values in this interval not displayed.       Radiology:   Imaging Results (Last 72 Hours)       ** No results found for the last 72 hours. **            Culture:  Blood Culture   Date Value Ref Range Status   03/26/2024 Staphylococcus aureus, MRSA (C)  Final     Comment:       Infectious disease consultation is highly recommended to rule out distant foci of infection.  Methicillin resistant Staphylococcus aureus, Patient may be an isolation risk.   03/26/2024 Staphylococcus aureus, MRSA (C)  Final     Comment:       Infectious disease consultation is highly recommended to rule out distant foci of infection.  Methicillin resistant Staphylococcus aureus, Patient may be an isolation risk.         Assessment    Acute kidney injury, ATN--resolved  Baseline chronic kidney disease stage 3b  Type 2 diabetes, poorly controlled (A1c 10.8%)  Hypertension   Metabolic encephalopathy--improved  Anemia of CKD  Hypokalemia--improved  Sepsis due to infection of left foot wound  Hypercalcemia--improving    Plan:  Renal function remains stable  Monitor labs  When discharged, will need follow up in our office in 1-2  stephanie Alvarez, APRN  4/10/2024  11:14 CDT      Electronically signed by Vinny Hartley MD at 04/10/24 1150         4/10  A&OX4, VSS. Assist x1 and walker to ambulate, up in chair most of day. ACHS accuchecks with SSI. Left foot wound dressing changed, CDI. On room air, c/o back pain x1-PRN pain med given with relief. C/o nausea x1, Zofran given with relief., tolerated meals w/o difficulty. NPO at midnight for EGD 4/11. Consent signed and in chart. Contact Isolation maintained. Alarms set, call light in reach. Safety maintained and continue to monitor.

## 2024-04-11 NOTE — PROGRESS NOTES
"INFECTIOUS DISEASES PROGRESS NOTE    Patient:  Erick Luong  YOB: 1955  MRN: [id number]   Admit date: 3/26/2024   Admitting Physician: Rene Maloney MD  Primary Care Physician: Del Shetty MD    Chief Complaint: Thought he would be going home today.      Interval History: Patient underwent upper endoscopy today.  No active bleeding noted.    Allergies:   Allergies   Allergen Reactions    Cefepime Hives and Anaphylaxis    Bactrim [Sulfamethoxazole-Trimethoprim] Other (See Comments)     \"RENAL FAILURE\"    Vancomycin Itching    Zolpidem Unknown - High Severity     \"makes him crazy\"    Zolpidem Tartrate Unknown - Low Severity and Provider Review Needed    Metronidazole Rash       Current Scheduled Medications:   ascorbic acid, 1,000 mg, Oral, Daily  bumetanide, 2 mg, Oral, Daily  carvedilol, 3.125 mg, Oral, BID With Meals  donepezil, 10 mg, Oral, Daily  hydrocortisone, 25 mg, Rectal, BID  insulin detemir, 30 Units, Subcutaneous, Nightly  insulin lispro, 4-24 Units, Subcutaneous, 4x Daily AC & at Bedtime  insulin regular, 10 Units, Subcutaneous, TID AC  isosorbide mononitrate, 30 mg, Oral, Q24H  lactulose, 20 g, Oral, TID  linezolid, 600 mg, Oral, Q12H  melatonin, 6 mg, Oral, Nightly  midodrine, 2.5 mg, Oral, TID AC  mupirocin, 1 Application, Each Nare, BID  pantoprazole, 40 mg, Oral, BID AC  senna-docusate sodium, 1 tablet, Oral, BID   And  polyethylene glycol, 17 g, Oral, Daily  pregabalin, 100 mg, Oral, Nightly  pregabalin, 50 mg, Oral, Daily  rosuvastatin, 10 mg, Oral, Nightly  sodium chloride, 10 mL, Intravenous, Q12H  sucralfate, 1 g, Oral, TID AC  tamsulosin, 0.4 mg, Oral, Daily  timolol, 1 drop, Both Eyes, Q12H  zinc sulfate, 220 mg, Oral, Daily      Current PRN Medications:    acetaminophen **OR** acetaminophen **OR** acetaminophen    senna-docusate sodium **AND** polyethylene glycol **AND** bisacodyl **AND** bisacodyl    dextrose    dextrose    Diclofenac Sodium    dicyclomine    " "glucagon (human recombinant)    magnesium hydroxide    nitroglycerin    ondansetron ODT **OR** ondansetron    oxyCODONE    sodium chloride    sodium chloride    sodium chloride    sodium chloride    sodium chloride, 500 mL, Last Rate: 25 mL/hr at 24 1221  sodium chloride, 100 mL/hr           Objective     Vital Signs:  Temp (24hrs), Av.6 °F (36.4 °C), Min:97.2 °F (36.2 °C), Max:97.8 °F (36.6 °C)      /62 (BP Location: Right arm, Patient Position: Lying)   Pulse 54   Temp 97.2 °F (36.2 °C) (Axillary)   Resp 14   Ht 182.9 cm (72\")   Wt 131 kg (289 lb 3.2 oz)   SpO2 96%   BMI 39.22 kg/m²         Physical Exam:    General: Patient sitting up in bed in no acute distress  Respiratory: Effort even and unlabored  Left lower extremity dressing clean dry and intact        esults Review:    I reviewed the patient's new clinical results.    Lab Results:    CBC:   Lab Results   Lab 24  0512 24  0340 24  1344 24  0422 24  1429 24  0623 24  1218 24  0611 24  1237 04/10/24  0552 04/10/24  1300 04/10/24  1742 24  0008 24  0618   WBC 6.93 5.43  --  5.70  --  6.19  --  4.35  --  4.60  --   --   --  6.33   HEMOGLOBIN 10.9* 9.5*   < > 9.2*   < > 8.7*   < > 9.2*   < > 8.2*   < > 8.7* 8.9* 8.4*   HEMATOCRIT 34.5* 30.2*   < > 29.5*   < > 27.0*   < > 29.0*   < > 26.3*   < > 26.7* 28.2* 26.5*   PLATELETS 321 294  --  275  --  246  --  231  --  173  --   --   --  145    < > = values in this interval not displayed.        AutoDiff:   Lab Results   Lab 24  0611 04/10/24  0552 2418   NEUTROPHIL % 45.1 50.9 63.0   LYMPHOCYTE % 40.0 35.4 26.7   MONOCYTES % 9.9 9.1 7.1   EOSINOPHIL % 4.1 3.7 2.4   BASOPHIL % 0.7 0.7 0.5   NEUTROS ABS 1.96 2.34 3.99   LYMPHS ABS 1.74 1.63 1.69   MONOS ABS 0.43 0.42 0.45   EOS ABS 0.18 0.17 0.15   BASOS ABS 0.03 0.03 0.03        Manual Diff:    Lab Results   Lab 2411 04/10/24  0552 24 "   NEUTROS ABS 1.96 2.34 3.99           CMP:   Lab Results   Lab 04/05/24  0512 04/06/24  0340 04/08/24  0623 04/09/24  0610 04/10/24  0553 04/11/24  0618   SODIUM 139  139   < > 141 141 141 141   POTASSIUM 4.4  4.4   < > 4.0 4.1 4.1 4.2   CHLORIDE 102  102   < > 104 104 105 104   CO2 25.0  25.0   < > 25.0 26.0 26.0 27.0   BUN 31*  31*   < > 30* 28* 27* 31*   CREATININE 1.98*  1.98*   < > 1.61* 1.84* 1.85* 2.02*   CALCIUM 11.1*  11.1*   < > 9.1 9.4 9.2 9.1   BILIRUBIN 0.4  --  0.4  --   --   --    ALK PHOS 138*  --  107  --   --   --    ALT (SGPT) 18  --  14  --   --   --    AST (SGOT) 22  --  16  --   --   --    GLUCOSE 173*  173*   < > 82 170* 124* 174*    < > = values in this interval not displayed.       Estimated Creatinine Clearance: 49.7 mL/min (A) (by C-G formula based on SCr of 2.02 mg/dL (H)).    Culture Results:    Microbiology Results (last 10 days)       ** No results found for the last 240 hours. **               Radiology:       Imaging Results (Last 72 Hours)       ** No results found for the last 72 hours. **                Active Hospital Problems    Diagnosis     **Sepsis     Diabetic foot infection     Epigastric pain     Chronic kidney disease (CKD), stage IV (severe)     Chronic diastolic heart failure     BPH without obstruction/lower urinary tract symptoms     GERD without esophagitis     Diabetic polyneuropathy associated with type 2 diabetes mellitus     Anemia due to chronic kidney disease     Essential hypertension     Type 2 diabetes mellitus with hyperglycemia, with long-term current use of insulin        IMPRESSION:    MRSA bacteremia-blood cultures were positive March 23 and on repeat March 29..  Source was felt to be infected left foot with wounds and associated cellulitis on admission.  Blood cultures negative as of March 30.  Today is day #13/14 of antibiotic therapy with linezolid since first negative blood culture  Chronic kidney disease stage IIIb with acute kidney  injury-nephrology following.  Essentially baseline.  Type 2 diabetes mellitus-poorly controlled with hemoglobin A1c of 10.7 this admission  Bright red blood per rectum-colonoscopy April 7 with sigmoid bleeding that was clipped.  Upper endoscopy today did not reveal any active bleeding.  MRSA nasal screen positive-treated with intranasal mupirocin      RECOMMENDATION:   Continue linezolid p.o. has 3 more doses to complete course.  Adjusted antibiotic length in epic  Hemoglobin monitoring per attending/GI-noted GI recommended fully prepped colonoscopy as outpatient.  Patient to follow-up with Dr. Gomes in [place] as outpatient  Glucose management per attending  Fluid/diuretic management per nephrology.          Julia Adrian MD  04/11/24  14:31 CDT

## 2024-04-12 NOTE — PAYOR COMM NOTE
"REF:   [id number]     [facility]  FAX  [phone]    Erick Luong (68 y.o. Male)       Date of Birth   1955    Social Security Number       [place]   [address] [facility] [address]    Home Phone   [phone]    MRN   [id number]       [employer]   [facility]    Marital Status                               Admission Date   3/26/24    Admission Type   Emergency    Admitting Provider   Rene Maloney MD    Attending Provider       Department, Room/Bed   [facility] 3C, 372/1       Discharge Date   4/11/2024    Discharge Disposition   [place] [facility] [place] [facility] [place] Destination                                 Attending Provider: (none)   Allergies: Cefepime, Bactrim [Sulfamethoxazole-trimethoprim], Vancomycin, Zolpidem, Zolpidem Tartrate, Metronidazole    Isolation: None   Infection: MRSA (05/19/19), COVID (History) (08/08/22)   Code Status: Prior    Ht: 182.9 cm (72\")   Wt: 131 kg (289 lb 3.2 oz)    Admission Cmt: None   Principal Problem: Sepsis [A41.9]                   Active Insurance as of 3/26/2024       Primary Coverage       Payor Plan Insurance Group Employer/Plan Group    [employer] EM[employer] [id number]       Payor Plan Address Payor Plan Phone Number Payor Plan Fax Number Effective Dates    [address] [phone]  1/1/2022 - None Entered    [facility] [id number]         Subscriber Name Subscriber Birth Date Member ID       ZAINAB LUONG 11/27/1969 [id number]               Secondary Coverage       Payor Plan Insurance Group Employer/Plan Group    MEDICARE MEDICARE A & B        Payor Plan Address Payor Plan Phone Number Payor Plan Fax Number Effective Dates    [address] [phone]  7/1/2013 - None Entered    [facility] [id number]         Subscriber Name Subscriber Birth Date Member ID       ERICK LUONG 1955 [id number]                     Emergency Contacts        (Rel.) Home Phone Work Phone Mobile Phone "    Joan Luong (Spouse) [phone]               Discharge Order (From admission, onward)       Start     Ordered    04/11/24 1532  Discharge patient  Once        Expected Discharge Date: 04/11/24   Expected Discharge Time: Afternoon   Discharge Disposition: Home-Health Care [facility] [place]   Physician of Record for Attribution - Please select from Treatment Team: ADITYA GUNN [[id number]]   Review needed by CMO to determine Physician of Record: No      Question Answer Comment   Physician of Record for Attribution - Please select from Treatment Team ADITYA GUNN    Review needed by CMO to determine Physician of Record No        04/11/24 9952

## 2024-04-12 NOTE — THERAPY DISCHARGE NOTE
Acute Care - Occupational Therapy Discharge Summary  [facility]     Patient Name: Erick Luong  : 1955  MRN: [id number]    Today's Date: 2024       Date of Referral to OT: 24         Admit Date: 3/26/2024        OT Recommendation and Plan    Visit Dx:    ICD-10-CM ICD-9-CM   1. Diabetic foot infection  E11.628 250.80    L08.9 686.9   2. Poorly controlled diabetes mellitus  E11.65 250.00   3. Impaired functional mobility and activity tolerance [Z74.09]  Z74.09 V49.89   4. Rectal bleeding  K62.5 569.3   5. Anemia due to stage 3 chronic kidney disease, unspecified whether stage 3a or 3b CKD  N18.30 285.21    D63.1 585.3   6. GERD without esophagitis  K21.9 530.81   7. Epigastric pain  R10.13 789.06   8. Chronic kidney disease (CKD), stage IV (severe)  N18.4 585.4   9. Sepsis due to methicillin resistant Staphylococcus aureus (MRSA) with encephalopathy without septic shock  A41.02 038.12    R65.20 995.92    G93.41 348.31                OT Rehab Goals       Row Name 24 1300             Transfer Goal 1 (OT)    Activity/Assistive Device (Transfer Goal 1, OT) bed-to-chair/chair-to-bed;toilet;tub;commode  -LR      [place] Level/Cues Needed (Transfer Goal 1, OT) standby assist  -LR      Time Frame (Transfer Goal 1, OT) 10 days  -LR      Progress/Outcome (Transfer Goal 1, OT) goal not met  -LR         Dressing Goal 1 (OT)    Activity/Device (Dressing Goal 1, OT) lower body dressing  -LR      [place]/Cues Needed (Dressing Goal 1, OT) standby assist  -LR      Time Frame (Dressing Goal 1, OT) 10 days  -LR      Progress/Outcome (Dressing Goal 1, OT) goal not met  -LR         Toileting Goal 1 (OT)    Activity/Device (Toileting Goal 1, OT) toileting skills, all  -LR      [place] Level/Cues Needed (Toileting Goal 1, OT) standby assist  -LR      Time Frame (Toileting Goal 1, OT) long term goal (LTG);by discharge  -LR      Progress/Outcome (Toileting Goal 1, OT) goal not met  -LR          Grooming Goal 1 (OT)    Activity/Device (Grooming Goal 1, OT) grooming skills, all  -LR      [place] (Grooming Goal 1, OT) standby assist  -LR      Time Frame (Grooming Goal 1, OT) 10 days  -LR      Strategies/Barriers (Grooming Goal 1, OT) standing sink side  -LR      Progress/Outcome (Grooming Goal 1, OT) goal not met  -LR                User Key  (r) = Recorded By, (t) = Taken By, (c) = Cosigned By      Initials Name Provider Type Discipline    LR Tabitha Webb OTR/L Occupational Therapist OT                        Timed Therapy Charges  Total Units: 2      Suggested Charges  Total Units: 2      Procedure Name Documented Minutes Units Code    HC OT SELF CARE/MGMT/TRAIN EA 15 MIN 30 2   81255 (CPT®)                 Documented Minutes  Total Minutes: 30      Therapy Provided Minutes    53449 - OT Self Care/Mgmt Minutes 30                        OT Discharge Summary  Anticipated Discharge Disposition (OT): sub acute care setting, home with assist, home with home health  Reason for Discharge: Discharge from facility  Outcomes Achieved: Refer to plan of care for updates on goals achieved  Discharge Destination: Home with assist      ALEXANDER Dick/EDVIN  4/12/2024

## 2024-04-12 NOTE — THERAPY DISCHARGE NOTE
Acute Care - Physical Therapy Discharge Summary  [facility]       Patient Name: Erick Luong  : 1955  MRN: [id number]    Today's Date: 2024                 Admit Date: 3/26/2024      PT Recommendation and Plan    Visit Dx:    ICD-10-CM ICD-9-CM   1. Diabetic foot infection  E11.628 250.80    L08.9 686.9   2. Poorly controlled diabetes mellitus  E11.65 250.00   3. Impaired functional mobility and activity tolerance [Z74.09]  Z74.09 V49.89   4. Rectal bleeding  K62.5 569.3   5. Anemia due to stage 3 chronic kidney disease, unspecified whether stage 3a or 3b CKD  N18.30 285.21    D63.1 585.3   6. GERD without esophagitis  K21.9 530.81   7. Epigastric pain  R10.13 789.06   8. Chronic kidney disease (CKD), stage IV (severe)  N18.4 585.4   9. Sepsis due to methicillin resistant Staphylococcus aureus (MRSA) with encephalopathy without septic shock  A41.02 038.12    R65.20 995.92    G93.41 348.31                PT Rehab Goals       Row Name 24 0700             Bed Mobility Goal 1 (PT)    Activity/Assistive Device (Bed Mobility Goal 1, PT) sit to supine/supine to sit;sit to sidelying  -AB      [place] Level/Cues Needed (Bed Mobility Goal 1, PT) contact guard required;standby assist  -AB      Time Frame (Bed Mobility Goal 1, PT) long term goal (LTG);by discharge  -AB      Strategies/Barriers (Bed Mobility Goal 1, PT) w/ bedrails  -AB      Progress/Outcomes (Bed Mobility Goal 1, PT) goal met  -AB         Transfer Goal 1 (PT)    Activity/Assistive Device (Transfer Goal 1, PT) bed-to-chair/chair-to-bed;other (see comments)  WBAT LLE cam boot  -AB      [place] Level/Cues Needed (Transfer Goal 1, PT) standby assist  -AB      Time Frame (Transfer Goal 1, PT) long term goal (LTG);10 days  -AB      Progress/Outcome (Transfer Goal 1, PT) goal met  -AB         Gait Training Goal 1 (PT)    Activity/Assistive Device (Gait Training Goal 1, PT) gait (walking locomotion);assistive device use;diminish gait  deviation;decrease fall risk;improve balance and speed;increase endurance/gait distance;walker, rolling;decrease asymmetrical patterns;other (see comments)  Lacked heel strike and decreased foot clearance; cam boot  -AB      [place] Level (Gait Training Goal 1, PT) contact guard required  -AB      Distance (Gait Training Goal 1, PT) 100 feet with improved gait mechanics and CAM walking boot in place  -AB      Time Frame (Gait Training Goal 1, PT) long term goal (LTG);by discharge  -AB      Strategies/Barriers (Gait Training Goal 1, PT) clarification made by APRN for clearance to WBAT to walk with CAM boot  -AB      Progress/Outcome (Gait Training Goal 1, PT) goal met  -AB         Stairs Goal 1 (PT)    Activity/Assistive Device (Stairs Goal 1, PT) ascending stairs;descending stairs;maintain weight-bearing status;step-over step;step-to-step;decrease fall risk;improve balance and speed;assistive device use;walker, rolling  -AB      [place] Level/Cues Needed (Stairs Goal 1, PT) contact guard required  -AB      Number of Stairs (Stairs Goal 1, PT) 2  -AB      Time Frame (Stairs Goal 1, PT) long term goal (LTG);by discharge  -AB      Strategies/Barriers (Stairs Goal 1, PT) WBAT LLE w/ cam boot  -AB      Progress/Outcome (Stairs Goal 1, PT) goal not met  -AB         Problem Specific Goal 1 (PT)    Problem Specific Goal 1 (PT) Pt will score overall 5/5 BLE strength  -AB      Time Frame (Problem Specific Goal 1, PT) long-term goal (LTG);by discharge  -AB      Progress/Outcome (Problem Specific Goal 1, PT) goal not met  -AB                User Key  (r) = Recorded By, (t) = Taken By, (c) = Cosigned By      Initials Name Provider Type Discipline    AB Donna Rodriguez, PTA Physical Therapist Assistant PT                        PT Discharge Summary  Anticipated Discharge Disposition (PT): sub acute care setting, skilled nursing facility, other (see comments)  Reason for Discharge: Discharge from facility  Outcomes  Achieved: Refer to plan of care for updates on goals achieved  Discharge Destination: Home with home health      Donna Rodriguez, PTA   4/12/2024

## 2024-04-12 NOTE — CASE MANAGEMENT/SOCIAL WORK
Continued Stay Note   [place]     Patient Name: Erick Luong  MRN: [id number]  Today's Date: 4/12/2024    Admit Date: 3/26/2024    Plan: Home   Discharge Plan       Row Name 04/12/24 1142       Plan    Plan Home    Final Discharge Disposition Code 01 - home or self-care    Final Note Pt had orders for home health. Spoke with his wife Joan and she did not want.                   Discharge Codes    No documentation.                 Expected Discharge Date and Time       Expected Discharge Date Expected Discharge Time    Apr 11, 2024               MEJIA Carreno

## 2024-04-12 NOTE — OUTREACH NOTE
Prep Survey      Flowsheet Row Responses   [employer] facility patient discharged from? [facility]   Is LACE score < 7 ? No   Eligibility Readm Mgmt   Discharge diagnosis Sepsis   Does the patient have one of the following disease processes/diagnoses(primary or secondary)? Sepsis   Does the patient have Home health ordered? No   Is there a DME ordered? No   Prep survey completed? Yes            LU MITCHELL - Registered Nurse

## 2024-04-15 ENCOUNTER — HOSPITAL ENCOUNTER (EMERGENCY)
Facility: HOSPITAL | Age: 69
Discharge: HOME | End: 2024-04-16
Payer: COMMERCIAL

## 2024-04-15 ENCOUNTER — APPOINTMENT (OUTPATIENT)
Dept: CT IMAGING | Facility: HOSPITAL | Age: 69
End: 2024-04-15
Payer: COMMERCIAL

## 2024-04-15 DIAGNOSIS — K40.90 UNILATERAL INGUINAL HERNIA WITHOUT OBSTRUCTION OR GANGRENE, RECURRENCE NOT SPECIFIED: ICD-10-CM

## 2024-04-15 DIAGNOSIS — R19.7 DIARRHEA, UNSPECIFIED TYPE: ICD-10-CM

## 2024-04-15 DIAGNOSIS — R73.09 HEMOGLOBIN A1C GREATER THAN 9.0%: ICD-10-CM

## 2024-04-15 DIAGNOSIS — N28.1 RENAL CYST: ICD-10-CM

## 2024-04-15 DIAGNOSIS — R10.9 ABDOMINAL PAIN, UNSPECIFIED ABDOMINAL LOCATION: Primary | ICD-10-CM

## 2024-04-15 DIAGNOSIS — N18.9 CHRONIC KIDNEY DISEASE, UNSPECIFIED CKD STAGE: ICD-10-CM

## 2024-04-15 LAB
ACETONE BLD QL: NEGATIVE
ALBUMIN SERPL-MCNC: 3.7 G/DL (ref 3.5–5.2)
ALBUMIN/GLOB SERPL: 1.5 G/DL
ALP SERPL-CCNC: 102 U/L (ref 39–117)
ALT SERPL W P-5'-P-CCNC: 14 U/L (ref 1–41)
AMMONIA BLD-SCNC: 21 UMOL/L (ref 16–60)
ANION GAP SERPL CALCULATED.3IONS-SCNC: 13 MMOL/L (ref 5–15)
APTT PPP: 31.8 SECONDS (ref 24.5–36)
AST SERPL-CCNC: 13 U/L (ref 1–40)
BACTERIA UR QL AUTO: ABNORMAL /HPF
BASOPHILS # BLD AUTO: 0.03 10*3/MM3 (ref 0–0.2)
BASOPHILS NFR BLD AUTO: 0.5 % (ref 0–1.5)
BILIRUB SERPL-MCNC: 0.4 MG/DL (ref 0–1.2)
BILIRUB UR QL STRIP: NEGATIVE
BUN SERPL-MCNC: 39 MG/DL (ref 8–23)
BUN/CREAT SERPL: 17 (ref 7–25)
CALCIUM SPEC-SCNC: 8.6 MG/DL (ref 8.6–10.5)
CHLORIDE SERPL-SCNC: 103 MMOL/L (ref 98–107)
CLARITY UR: CLEAR
CO2 SERPL-SCNC: 24 MMOL/L (ref 22–29)
COLOR UR: YELLOW
CREAT SERPL-MCNC: 2.3 MG/DL (ref 0.76–1.27)
D-LACTATE SERPL-SCNC: 2.4 MMOL/L (ref 0.5–2)
D-LACTATE SERPL-SCNC: 3.4 MMOL/L (ref 0.5–2)
DEPRECATED RDW RBC AUTO: 45.1 FL (ref 37–54)
EGFRCR SERPLBLD CKD-EPI 2021: 30.4 ML/MIN/1.73
EOSINOPHIL # BLD AUTO: 0.12 10*3/MM3 (ref 0–0.4)
EOSINOPHIL NFR BLD AUTO: 2.1 % (ref 0.3–6.2)
ERYTHROCYTE [DISTWIDTH] IN BLOOD BY AUTOMATED COUNT: 14 % (ref 12.3–15.4)
GLOBULIN UR ELPH-MCNC: 2.4 GM/DL
GLUCOSE BLDC GLUCOMTR-MCNC: 346 MG/DL (ref 70–130)
GLUCOSE SERPL-MCNC: 356 MG/DL (ref 65–99)
GLUCOSE UR STRIP-MCNC: ABNORMAL MG/DL
HBA1C MFR BLD: 10.7 % (ref 4.8–5.6)
HCT VFR BLD AUTO: 26.4 % (ref 37.5–51)
HGB BLD-MCNC: 8.3 G/DL (ref 13–17.7)
HGB UR QL STRIP.AUTO: NEGATIVE
HYALINE CASTS UR QL AUTO: ABNORMAL /LPF
INR PPP: 1.06 (ref 0.91–1.09)
KETONES UR QL STRIP: ABNORMAL
LEUKOCYTE ESTERASE UR QL STRIP.AUTO: ABNORMAL
LIPASE SERPL-CCNC: 7 U/L (ref 13–60)
LYMPHOCYTES # BLD AUTO: 1.47 10*3/MM3 (ref 0.7–3.1)
LYMPHOCYTES NFR BLD AUTO: 25.3 % (ref 19.6–45.3)
MCH RBC QN AUTO: 27.7 PG (ref 26.6–33)
MCHC RBC AUTO-ENTMCNC: 31.4 G/DL (ref 31.5–35.7)
MCV RBC AUTO: 88 FL (ref 79–97)
MONOCYTES # BLD AUTO: 0.51 10*3/MM3 (ref 0.1–0.9)
MONOCYTES NFR BLD AUTO: 8.8 % (ref 5–12)
NEUTROPHILS NFR BLD AUTO: 3.66 10*3/MM3 (ref 1.7–7)
NEUTROPHILS NFR BLD AUTO: 62.8 % (ref 42.7–76)
NITRITE UR QL STRIP: NEGATIVE
PH UR STRIP.AUTO: <=5 [PH] (ref 5–8)
PLATELET # BLD AUTO: 133 10*3/MM3 (ref 140–450)
PMV BLD AUTO: 12.3 FL (ref 6–12)
POTASSIUM SERPL-SCNC: 4.5 MMOL/L (ref 3.5–5.2)
PROCALCITONIN SERPL-MCNC: 0.08 NG/ML (ref 0–0.25)
PROT SERPL-MCNC: 6.1 G/DL (ref 6–8.5)
PROT UR QL STRIP: ABNORMAL
PROTHROMBIN TIME: 14.2 SECONDS (ref 11.8–14.8)
RBC # BLD AUTO: 3 10*6/MM3 (ref 4.14–5.8)
RBC # UR STRIP: ABNORMAL /HPF
REF LAB TEST METHOD: ABNORMAL
SODIUM SERPL-SCNC: 140 MMOL/L (ref 136–145)
SP GR UR STRIP: 1.02 (ref 1–1.03)
SPERM URNS QL MICRO: ABNORMAL /HPF
SQUAMOUS #/AREA URNS HPF: ABNORMAL /HPF
UROBILINOGEN UR QL STRIP: ABNORMAL
WBC # UR STRIP: ABNORMAL /HPF
WBC NRBC COR # BLD AUTO: 5.82 10*3/MM3 (ref 3.4–10.8)
YEAST URNS QL MICRO: ABNORMAL /HPF

## 2024-04-15 PROCEDURE — 84145 PROCALCITONIN (PCT): CPT | Performed by: PHYSICIAN ASSISTANT

## 2024-04-15 PROCEDURE — 25010000002 MORPHINE PER 10 MG: Performed by: STUDENT IN AN ORGANIZED HEALTH CARE EDUCATION/TRAINING PROGRAM

## 2024-04-15 PROCEDURE — 80053 COMPREHEN METABOLIC PANEL: CPT | Performed by: PHYSICIAN ASSISTANT

## 2024-04-15 PROCEDURE — 36415 COLL VENOUS BLD VENIPUNCTURE: CPT

## 2024-04-15 PROCEDURE — 85730 THROMBOPLASTIN TIME PARTIAL: CPT | Performed by: PHYSICIAN ASSISTANT

## 2024-04-15 PROCEDURE — 85025 COMPLETE CBC W/AUTO DIFF WBC: CPT | Performed by: PHYSICIAN ASSISTANT

## 2024-04-15 PROCEDURE — 74177 CT ABD & PELVIS W/CONTRAST: CPT

## 2024-04-15 PROCEDURE — 87086 URINE CULTURE/COLONY COUNT: CPT | Performed by: PHYSICIAN ASSISTANT

## 2024-04-15 PROCEDURE — 82009 KETONE BODYS QUAL: CPT | Performed by: PHYSICIAN ASSISTANT

## 2024-04-15 PROCEDURE — 81001 URINALYSIS AUTO W/SCOPE: CPT | Performed by: PHYSICIAN ASSISTANT

## 2024-04-15 PROCEDURE — 96376 TX/PRO/DX INJ SAME DRUG ADON: CPT

## 2024-04-15 PROCEDURE — 96374 THER/PROPH/DIAG INJ IV PUSH: CPT

## 2024-04-15 PROCEDURE — 25510000001 IOPAMIDOL 61 % SOLUTION: Performed by: PHYSICIAN ASSISTANT

## 2024-04-15 PROCEDURE — 83036 HEMOGLOBIN GLYCOSYLATED A1C: CPT | Performed by: PHYSICIAN ASSISTANT

## 2024-04-15 PROCEDURE — 25010000002 ONDANSETRON PER 1 MG: Performed by: STUDENT IN AN ORGANIZED HEALTH CARE EDUCATION/TRAINING PROGRAM

## 2024-04-15 PROCEDURE — 25010000002 ONDANSETRON PER 1 MG: Performed by: PHYSICIAN ASSISTANT

## 2024-04-15 PROCEDURE — 83605 ASSAY OF LACTIC ACID: CPT | Performed by: PHYSICIAN ASSISTANT

## 2024-04-15 PROCEDURE — 82140 ASSAY OF AMMONIA: CPT | Performed by: PHYSICIAN ASSISTANT

## 2024-04-15 PROCEDURE — 82948 REAGENT STRIP/BLOOD GLUCOSE: CPT

## 2024-04-15 PROCEDURE — 96375 TX/PRO/DX INJ NEW DRUG ADDON: CPT

## 2024-04-15 PROCEDURE — 85610 PROTHROMBIN TIME: CPT | Performed by: PHYSICIAN ASSISTANT

## 2024-04-15 PROCEDURE — 99285 EMERGENCY DEPT VISIT HI MDM: CPT

## 2024-04-15 PROCEDURE — 83690 ASSAY OF LIPASE: CPT | Performed by: PHYSICIAN ASSISTANT

## 2024-04-15 RX ORDER — ONDANSETRON 2 MG/ML
4 INJECTION INTRAMUSCULAR; INTRAVENOUS ONCE
Status: COMPLETED | OUTPATIENT
Start: 2024-04-15 | End: 2024-04-15

## 2024-04-15 RX ORDER — SODIUM CHLORIDE 0.9 % (FLUSH) 0.9 %
10 SYRINGE (ML) INJECTION AS NEEDED
Status: DISCONTINUED | OUTPATIENT
Start: 2024-04-15 | End: 2024-04-16 | Stop reason: HOSPADM

## 2024-04-15 RX ORDER — FAMOTIDINE 10 MG/ML
20 INJECTION, SOLUTION INTRAVENOUS ONCE
Status: COMPLETED | OUTPATIENT
Start: 2024-04-15 | End: 2024-04-15

## 2024-04-15 RX ORDER — OXYCODONE AND ACETAMINOPHEN 7.5; 325 MG/1; MG/1
1 TABLET ORAL ONCE
Status: COMPLETED | OUTPATIENT
Start: 2024-04-15 | End: 2024-04-15

## 2024-04-15 RX ADMIN — ONDANSETRON 4 MG: 2 INJECTION INTRAMUSCULAR; INTRAVENOUS at 23:34

## 2024-04-15 RX ADMIN — IOPAMIDOL 100 ML: 612 INJECTION, SOLUTION INTRAVENOUS at 21:10

## 2024-04-15 RX ADMIN — OXYCODONE HYDROCHLORIDE AND ACETAMINOPHEN 1 TABLET: 7.5; 325 TABLET ORAL at 23:34

## 2024-04-15 RX ADMIN — FAMOTIDINE 20 MG: 10 INJECTION INTRAVENOUS at 19:33

## 2024-04-15 RX ADMIN — ONDANSETRON 4 MG: 2 INJECTION INTRAMUSCULAR; INTRAVENOUS at 19:31

## 2024-04-15 RX ADMIN — MORPHINE SULFATE 4 MG: 4 INJECTION, SOLUTION INTRAMUSCULAR; INTRAVENOUS at 19:36

## 2024-04-15 NOTE — ED PROVIDER NOTES
Subjective   History of Present Illness    Patient is a 68-year-old male presenting to ED with abdominal pain.  PMH significant for recent GI bleed, autonomic disease, CAD, chronic constipation, CAD, insulin-dependent diabetes, hypertension, history osteomyelitis, history pancreatitis, history of sleep apnea with noncompliant CPAP, status post CABG, appendectomy.  Wife at bedside to provide additional history.  Patient states 4 days ago he was discharged from the hospital after complications with a foot wound and GI bleed.  Patient states that he has not had very good appetite and very mild nausea however last night he began developing increased and return of epigastric abdominal pain similar to what he had in the hospital.  Patient states that this evening he went to have a bowel movement which was slightly loose but no diarrhea or constipation and afterwards the pain significantly increased.  Patient denies any hematemesis, vomiting, black/bloody/tarry stools.  Patient states that he is not wanting to eat or drink and wife reports in the past 24 hours he has only had 1 large Diet Coke from [employer].  Patient states that because of this he is having decreased urination but denies dysuria, hematuria, or flank pain.  Patient states that he completed his dose of Zyvox, the oral antibiotic for his foot yesterday and feels that that is improving however could no longer tolerate the abdominal pain.  Patient is scheduled to see outpatient GI tomorrow with plans for a repeat colonoscopy in the upcoming weeks as they were unable to complete the procedure during his admission.  Patient denies any other known sick contacts since being discharged from the hospital.  Patient denies fevers, chills, diaphoresis, any further medication use for symptoms, and presents at this time for further evaluation.    Wife did state that after patient's admission it was recommended he go to a nursing and rehab facility for which his primary  care provider encouraged him however patient declined stating he prefers to be at home.    Wife states that they followed up at wound care with Dr. Leal in [place] today where patient had further debridement on his left foot and had been doing well however podiatrist there as well encourage patient to seek rehab facility.  Patient states that this time that he has no desire as he wishes to be back in his home.    Records reviewed show patient was seen in the ED on 3/26/2024 and admitted until 4/11/2024 for diabetic foot infection, poorly controlled diabetes, rectal bleeding, anemia due to stage III CKD, GERD without esophagitis, epigastric pain, sepsis due to MRSA with encephalopathy.  Patient had upper endoscopy performed on 4/11/2024 and a colonoscopy performed on 4/7/2024.  Patient had a TTE performed on 3/26/2024 which showed Recommendation for transesophageal echocardiogram to better visualize concern for infective endocarditis, LV systolic function normal, LVEF 61 to 65%.  Patient was initially treated for a foot wound with IV linezolid due to vancomycin allergy however after blood cultures grew MRSA infectious disease was consulted and patient was transition to p.o. Zyvox.  Patient had clipping of some bleeding lesions during colonoscopy and stopped his home aspirin and Eliquis prior to bowel preparation for which she was instructed to follow-up with GI after discharge for a repeat colonoscopy with a proper bowel prep.    Patient has since been seen outpatient at the nephrology office 4/12/2024.      Review of Systems   Constitutional:  Negative for chills, diaphoresis and fever.   HENT: Negative.  Negative for trouble swallowing.    Eyes: Negative.    Respiratory: Negative.  Negative for shortness of breath.    Cardiovascular: Negative.    Gastrointestinal:  Positive for abdominal distention (Upper), abdominal pain (Epigastric) and nausea. Negative for blood in stool, constipation, diarrhea and  "vomiting.        Denies hematemesis   Genitourinary:  Positive for decreased urine volume. Negative for dysuria, flank pain and hematuria.   Musculoskeletal: Negative.    Skin: Negative.    Neurological:  Positive for weakness (Generalized).   Psychiatric/Behavioral: Negative.     All other systems reviewed and are negative.      Past Medical History:   Diagnosis Date    Arthritis     Autonomic disease     CAD (coronary artery disease) 02/06/2017    Cervical radiculopathy 09/16/2021    Chronic constipation with acute exaccerbation 05/10/2021    Coronary artery disease     Degeneration of cervical intervertebral disc 08/11/2021    Diabetes mellitus     Diabetic foot ulcer 08/31/2020    Diabetic polyneuropathy associated with type 2 diabetes mellitus 01/18/2021    Elevated cholesterol     Gastroesophageal reflux disease 05/13/2019    Headache     HTN (hypertension), benign 05/03/2017    Hyperlipidemia     Hypertension     Mixed hyperlipidemia 02/07/2017    Multiple lung nodules 01/26/2020    5mm, 9 mm RLL identified 1/2020, not present 10/2019.    Myocardial infarction     Osteomyelitis 01/22/2020    Osteomyelitis of fifth toe of right foot 10/07/2019    Pancreatitis     Persistent insomnia 01/20/2020    Renal disorder     Sleep apnea 02/06/2017    Sleep apnea with use of continuous positive airway pressure (CPAP)     NON-COMPLIANT    Slow transit constipation 01/16/2019    Spinal stenosis in cervical region 09/16/2021    Vitamin D deficiency 03/02/2021       Allergies   Allergen Reactions    Cefepime Hives and Anaphylaxis    Bactrim [Sulfamethoxazole-Trimethoprim] Other (See Comments)     \"RENAL FAILURE\"    Vancomycin Itching    Zolpidem Unknown - High Severity     \"makes him crazy\"    Zolpidem Tartrate Unknown - Low Severity and Provider Review Needed    Metronidazole Rash       Past Surgical History:   Procedure Laterality Date    ABDOMINAL SURGERY      AMPUTATION FOOT / TOE Left 10/2021    5th digit     ANTERIOR " CERVICAL DISCECTOMY W/ FUSION N/A 08/05/2022    Procedure: CERVICAL DISCECTOMY ANTERIOR WITH FUSION C5-6 with possible plating of C5-7 with neuromonitoring and 1 c-arm;  Surgeon: Karel Soliz MD;  Location:  [facility] OR;  Service: Neurosurgery;  Laterality: N/A;    APPENDECTOMY      BACK SURGERY      CARDIAC CATHETERIZATION Left 02/08/2021    Procedure: Left Heart Cath w poss intervention left anatomical snuff box acess;  Surgeon: Omkar Charles DO;  Location:  [facility] CATH INVASIVE LOCATION;  Service: Cardiology;  Laterality: Left;    CARDIAC SURGERY      CATARACT EXTRACTION      CERVICAL SPINE SURGERY      COLONOSCOPY N/A 01/31/2017    Normal exam repeat in 5 years    COLONOSCOPY N/A 02/11/2019    Mild acute inflammation    COLONOSCOPY N/A 04/07/2024    2 areas at 10 and 20 cm with friability ulceration 2 clips placed at 20 cm and 4 clips at 10 cm poor prep normal mucosa,mild eroisions and ulcerations in visible vessels    COLONOSCOPY W/ POLYPECTOMY  03/04/2014    Hyperplastic polyp    CORONARY ARTERY BYPASS GRAFT  10/2015    ENDOSCOPY  04/13/2011    Gastritis with hemorrhage    ENDOSCOPY N/A 05/05/2017    Normal exam    ENDOSCOPY N/A 02/11/2019    Gastritis    ENDOSCOPY N/A 09/01/2020    Non-erosive gastritis with hemorrhage    ENDOSCOPY N/A 02/10/2021    Esophagitis    ENDOSCOPY N/A 04/11/2024    There were esophageal mucosal changes suspicious for short-segment Low's esophagus present in the distal esophagus. The maximum longitudinal extent of these mucosal changes was 2 cm in length. Mucosa was biopsied with a cold forceps for histologyDistal esophagus, biopsies: Mild chronic active esophagogastritis. No evidence of intestinal metaplasia, dysplasia. Antrum, bx, Mild chronic gastritis    FOOT SURGERY Left     INCISION AND DRAINAGE OF WOUND Left 09/2007    spider bite       Family History   Problem Relation Age of Onset    Colon cancer Father     Heart disease Father     Colon cancer Sister      Colon polyps Sister     Alzheimer's disease Mother     Coronary artery disease Sister     Coronary artery disease Sister        Social History     Socioeconomic History    Marital status:    Tobacco Use    Smoking status: Former     Current packs/day: 0.00     Types: Cigarettes     Quit date:      Years since quittin.3    Smokeless tobacco: Never    Tobacco comments:     smoked in highschool   Vaping Use    Vaping status: Never Used   Substance and Sexual Activity    Alcohol use: No    Drug use: No    Sexual activity: Defer           Objective   Physical Exam  Vitals and nursing note reviewed.   Constitutional:       General: He is not in acute distress.     Appearance: Normal appearance. He is obese. He is ill-appearing (Chronically ill-appearing). He is not toxic-appearing or diaphoretic.   HENT:      Head: Normocephalic.      Mouth/Throat:      Mouth: Mucous membranes are dry.      Pharynx: Oropharynx is clear. No oropharyngeal exudate or posterior oropharyngeal erythema.   Eyes:      General: No scleral icterus.     Conjunctiva/sclera: Conjunctivae normal.      Pupils: Pupils are equal, round, and reactive to light.   Cardiovascular:      Rate and Rhythm: Tachycardia present.   Pulmonary:      Effort: Pulmonary effort is normal.      Breath sounds: Normal breath sounds.   Chest:      Chest wall: No tenderness.   Abdominal:      General: Bowel sounds are normal. There is no distension.      Palpations: Abdomen is soft.      Tenderness: There is abdominal tenderness (Epigastric). There is no right CVA tenderness, left CVA tenderness or guarding.   Musculoskeletal:         General: Normal range of motion.      Cervical back: Neck supple.   Skin:     General: Skin is warm and dry.      Coloration: Skin is not jaundiced.      Comments: Dressing noted to left lower extremity which is clean, dry, and intact.  No jaundice, no petechial rashes, no vasculitis.   Neurological:      Mental Status: He is  alert and oriented to person, place, and time.   Psychiatric:         Mood and Affect: Mood normal.         Behavior: Behavior normal.         Procedures           ED Course                                             Medical Decision Making  Problems Addressed:  Abdominal pain, unspecified abdominal location: complicated acute illness or injury  Chronic kidney disease, unspecified CKD stage: complicated acute illness or injury  Diarrhea, unspecified type: complicated acute illness or injury  Hemoglobin A1c greater than 9.0%: complicated acute illness or injury  Renal cyst: complicated acute illness or injury  Unilateral inguinal hernia without obstruction or gangrene, recurrence not specified: complicated acute illness or injury    Amount and/or Complexity of Data Reviewed  Independent Historian: spouse     Details: Wife  External Data Reviewed: labs, radiology and notes.  Labs: ordered. Decision-making details documented in ED Course.  Radiology: ordered. Decision-making details documented in ED Course.  ECG/medicine tests: ordered. Decision-making details documented in ED Course.  Discussion of management or test interpretation with external provider(s): Dr. Steve Arevalo (attending)    Risk  Prescription drug management.        Patient is a 68-year-old male presenting to ED with abdominal pain.  PMH significant for recent GI bleed, autonomic disease, CAD, chronic constipation, CAD, insulin-dependent diabetes, hypertension, history osteomyelitis, history pancreatitis, history of sleep apnea with noncompliant CPAP, status post CABG, appendectomy.  Upon initial evaluation patient is resting in bed chronically ill-appearing in no acute distress, nontoxic-appearing, non-ill-appearing.  Patient with stable vital signs.  Left lower extremity with dressing noted to the foot which is clean, dry, and intact.  No further dermatological abnormalities including no petechial rashes, no vasculitis, no jaundice, no scleral  icterus.  Abdomen with reproducible tenderness to the epigastric region however no localized tenderness.  No abdominal distention.  Abdomen is soft with no CVAT.  Discussed with patient need for workup to include repeat labs, urinalysis, as well as repeat CT imaging of his abdomen.  Discussed ability to provide IV antiemetics, IV PPI, as well as IV pain medication for which patient is amenable to treatment plan with no further questions, concerns, needs at this time.    Differential diagnosis: Gastritis, duodenal ulcer, gastric ulcer, colitis, diverticulosis, diverticulitis, pancreatitis, hepatitis, GI bleed, CKD, urinary tract infection, pyelonephritis, systemic infection, DKA, noncontrolled diabetes, other    Workup revealed normal white blood cell count, H&H 8.3/26.4 which is consistent with patient's baseline 5 days ago. Thrombocytopenia 133 with no further CBC abnormalities.  CMP with hyperglycemia 356, known CKD with creatinine 2.3, BUN 39, GFR 30.4.  Normal anion gap.  No electrolyte disturbances.  PT/INR WNL.  Low concern for pancreatic abnormality such as pancreatitis with lipase of 7.  Low concern for hepatic encephalopathy with ammonia normal at 21.  Urinalysis revealed small leukocytes and 21-50 WBC however moderate sperm, 3-6 squamous epithelium, no bacteria, negative nitrites for which a culture will be sent prior to initiation of antibiotic therapy.  Initial lactic acid elevated at 3.4 which improved to 2.4 and subsequently 2.1 after IV hydration.  Low concern for systemic bacterial process with normal procalcitonin.  CT imaging of the abdomen and pelvis showed: Liquid stool considered from the colon, no evidence for bowel obstruction, surgical clips within the rectum, no free air or abscess, moderate vascular calcification with no aneurysm or dissection, bilateral renal cyst requiring no follow-up, similar nonspecific.  Overt fat stranding with no hydronephrosis, fatty containing right inguinal  hernia.  Throughout evaluation patient rested comfortably in bed and had improvement in his initial hypertensive status and otherwise stable vital signs.  Patient was given a dose of Zofran, Pepcid and was able to tolerate p.o. fluids without difficulty.  Patient initially given morphine with only temporary and minimal improvement in his pain for which he was then given oral medicine and had significant improvement.  Discussed with patient need to follow-up with his GI provider at 12:45 PM today, his nephrologist at 3 PM, as well as his primary care provider later this week for continued close outpatient monitoring and evaluation.  Patient was unable to have a bowel movement while in ED and was sent home with a collection kit advised that he can return the stool study for further testing.  Discussed with patient and wife strict return precautions and need for immediate return to ED should he develop any new or worsening symptoms.  Patient is appreciative with no further questions, concerns, or needs at this time and is stable for discharge.      Final diagnoses:   Chronic kidney disease, unspecified CKD stage   Hemoglobin A1c greater than 9.0%   Renal cyst   Diarrhea, unspecified type   Unilateral inguinal hernia without obstruction or gangrene, recurrence not specified   Abdominal pain, unspecified abdominal location       ED Disposition  ED Disposition       ED Disposition   Discharge    Condition   Stable    Comment   --               Del Shetty MD  [address] [facility] [address] [phone]    Schedule an appointment as soon as possible for a visit in 2 days      Your GI Doctor      Follow-up later today as previously scheduled    Your Kidney Doctor      Follow-up later today as previously scheduled    [facility] EMERGENCY DEPARTMENT  [address] [place] [id number] [phone]    As needed         Medication List        Changed      fluticasone 50 MCG/ACT nasal  spray  Commonly known as: FLONASE  1 spray into the nostril(s) as directed by provider Daily.  What changed: when to take this                 Dylan Jean Baptiste PA-C  04/16/24 0149

## 2024-04-16 ENCOUNTER — OFFICE VISIT (OUTPATIENT)
Dept: GASTROENTEROLOGY | Facility: CLINIC | Age: 69
End: 2024-04-16
Payer: COMMERCIAL

## 2024-04-16 ENCOUNTER — READMISSION MANAGEMENT (OUTPATIENT)
Dept: CALL CENTER | Facility: HOSPITAL | Age: 69
End: 2024-04-16
Payer: COMMERCIAL

## 2024-04-16 VITALS
WEIGHT: 275 LBS | SYSTOLIC BLOOD PRESSURE: 130 MMHG | BODY MASS INDEX: 37.25 KG/M2 | TEMPERATURE: 97.5 F | HEIGHT: 72 IN | OXYGEN SATURATION: 100 % | HEART RATE: 77 BPM | DIASTOLIC BLOOD PRESSURE: 74 MMHG

## 2024-04-16 VITALS
WEIGHT: 290 LBS | TEMPERATURE: 98 F | HEIGHT: 72 IN | SYSTOLIC BLOOD PRESSURE: 142 MMHG | OXYGEN SATURATION: 97 % | BODY MASS INDEX: 39.28 KG/M2 | RESPIRATION RATE: 18 BRPM | DIASTOLIC BLOOD PRESSURE: 70 MMHG | HEART RATE: 79 BPM

## 2024-04-16 DIAGNOSIS — R19.7 DIARRHEA, UNSPECIFIED TYPE: Primary | ICD-10-CM

## 2024-04-16 DIAGNOSIS — R10.10 PAIN OF UPPER ABDOMEN: ICD-10-CM

## 2024-04-16 DIAGNOSIS — R11.0 NAUSEA: ICD-10-CM

## 2024-04-16 DIAGNOSIS — Z78.9 NONSMOKER: ICD-10-CM

## 2024-04-16 LAB
BACTERIA SPEC AEROBE CULT: ABNORMAL
D-LACTATE SERPL-SCNC: 2.1 MMOL/L (ref 0.5–2)

## 2024-04-16 PROCEDURE — 83605 ASSAY OF LACTIC ACID: CPT | Performed by: PHYSICIAN ASSISTANT

## 2024-04-16 PROCEDURE — 25810000003 SODIUM CHLORIDE 0.9 % SOLUTION: Performed by: PHYSICIAN ASSISTANT

## 2024-04-16 PROCEDURE — 99214 OFFICE O/P EST MOD 30 MIN: CPT | Performed by: CLINICAL NURSE SPECIALIST

## 2024-04-16 RX ADMIN — SODIUM CHLORIDE 1000 ML: 9 INJECTION, SOLUTION INTRAVENOUS at 00:18

## 2024-04-16 NOTE — OUTREACH NOTE
Sepsis Week 1 Survey      Flowsheet Row Responses   [facility] facility patient discharged from? [place]   Does the patient have one of the following disease processes/diagnoses(primary or secondary)? Sepsis   Week 1 attempt successful? No   Unsuccessful attempts Attempt 1  [All numbers listed were attempted-no answer]            Ritu H - Registered Nurse

## 2024-04-16 NOTE — DISCHARGE INSTRUCTIONS
As we discussed please follow-up with your GI doctor later today at 12:45 PM as previously scheduled for further outpatient monitoring of your diarrhea, scheduling of your colonoscopy, and continued outpatient evaluation.  Please follow-up with your nephrologist as well as previously scheduled later today around 3 PM for further evaluation monitoring.  You will need to follow-up with your primary care provider as scheduled later in the week however should you develop any new or worsening symptoms please return to the ER for further evaluation.    Should you be able to have a bowel movement within the next 48 hours please return the collection kit for further testing of your diarrheal illness.

## 2024-04-16 NOTE — PROGRESS NOTES
Erick Luong  1955 4/16/2024  Chief Complaint   Patient presents with    GI Problem     Hfu-diarrhea     Subjective   HPI  Erick Luong is a 68 y.o. male who presents brought in to ER yesterday by wife due to C/O abdominal pain. Pt states he was just admitted here this past week for sepsis, GI bleed, requiring GI intervention. Reports nausea as well. Denies any fevers, diarrhea or vomitting. States he has taken pepcid and carafate with no relief. Is due for another colonoscopy in a couple of weeks per wife.  He says that the pain was across his stomach, severe for him. He had nausea with vomiting. Lactic acid 2.1. lipase normal at 7. He has had both recent endoscopy and colonoscopy noted below.   CT abdomen/pelvis with contrast 4/15/24  IMPRESSION:  1. Liquid stool considered within the colon from the level of the cecum  to the descending colon. No evidence for bowel obstruction. Surgical  clips within the rectum. No free air or abscess.  2. Moderate vascular calcification with no aneurysm or dissection.  3. Bilateral renal cysts requiring no routine follow-up. Similar  nonspecific perinephric fat stranding with no hydronephrosis.  4. Fatty containing right inguinal hernia.     This report was signed and finalized on 4/15/2024 9:22 PM by Dr. Yuliana Calhoun MD.    Today he is here today with primary complaint of diarrhea. He is having watery stool but not much in frequency. He has had a debridement of plantar wound treating with antibiotics for sepsis after presenting to the ER with confusion. CRP 4.3, WBC 14.6. Febrile at that time 100.7. blood cultures grew MRSA. ID followed him and treated with Linezolid, Zyvox.   Labs from the ER 4/15/24 H/H 8.3/26.4 6 days ago it was 8.0/25.6. Iron is 18, Iron Sat 7%. He has had some melena stool intermittent that was 2 days ago. He is not on iron supplement.     Colonoscopy 4/7/24 hemoclipping of a bleeding lesion and submucosal injection therapy of a bleeding  lesion. Incomplete due to poor prep. Dr hines    Endoscopy 4/11/24 - Esophageal mucosal changes suspicious for short-segment Low's esophagus. Biopsied. - Medium-sized hiatal hernia. - Erythematous mucosa in the antrum. Biopsied. - Normal examined duodenum Dr Otoole.  Past Medical History:   Diagnosis Date    Arthritis     Autonomic disease     CAD (coronary artery disease) 02/06/2017    Cervical radiculopathy 09/16/2021    Chronic constipation with acute exaccerbation 05/10/2021    Coronary artery disease     Degeneration of cervical intervertebral disc 08/11/2021    Diabetes mellitus     Diabetic foot ulcer 08/31/2020    Diabetic polyneuropathy associated with type 2 diabetes mellitus 01/18/2021    Elevated cholesterol     Gastroesophageal reflux disease 05/13/2019    Headache     HTN (hypertension), benign 05/03/2017    Hyperlipidemia     Hypertension     Mixed hyperlipidemia 02/07/2017    Multiple lung nodules 01/26/2020    5mm, 9 mm RLL identified 1/2020, not present 10/2019.    Myocardial infarction     Osteomyelitis 01/22/2020    Osteomyelitis of fifth toe of right foot 10/07/2019    Pancreatitis     Persistent insomnia 01/20/2020    Renal disorder     Sleep apnea 02/06/2017    Sleep apnea with use of continuous positive airway pressure (CPAP)     NON-COMPLIANT    Slow transit constipation 01/16/2019    Spinal stenosis in cervical region 09/16/2021    Vitamin D deficiency 03/02/2021     Past Surgical History:   Procedure Laterality Date    ABDOMINAL SURGERY      AMPUTATION FOOT / TOE Left 10/2021    5th digit     ANTERIOR CERVICAL DISCECTOMY W/ FUSION N/A 08/05/2022    Procedure: CERVICAL DISCECTOMY ANTERIOR WITH FUSION C5-6 with possible plating of C5-7 with neuromonitoring and 1 c-arm;  Surgeon: Karel Soliz MD;  Location: [facility] OR;  Service: Neurosurgery;  Laterality: N/A;    APPENDECTOMY      BACK SURGERY      CARDIAC CATHETERIZATION Left 02/08/2021    Procedure: Left Heart Cath w poss  intervention left anatomical snuff box acess;  Surgeon: Omkar Charles DO;  Location:  [facility]ATH INVASIVE LOCATION;  Service: Cardiology;  Laterality: Left;    CARDIAC SURGERY      CATARACT EXTRACTION      CERVICAL SPINE SURGERY      COLONOSCOPY N/A 01/31/2017    Normal exam repeat in 5 years    COLONOSCOPY N/A 02/11/2019    Mild acute inflammation    COLONOSCOPY N/A 04/07/2024    2 areas at 10 and 20 cm with friability ulceration 2 clips placed at 20 cm and 4 clips at 10 cm poor prep normal mucosa,mild eroisions and ulcerations in visible vessels    COLONOSCOPY W/ POLYPECTOMY  03/04/2014    Hyperplastic polyp    CORONARY ARTERY BYPASS GRAFT  10/2015    ENDOSCOPY  04/13/2011    Gastritis with hemorrhage    ENDOSCOPY N/A 05/05/2017    Normal exam    ENDOSCOPY N/A 02/11/2019    Gastritis    ENDOSCOPY N/A 09/01/2020    Non-erosive gastritis with hemorrhage    ENDOSCOPY N/A 02/10/2021    Esophagitis    ENDOSCOPY N/A 04/11/2024    There were esophageal mucosal changes suspicious for short-segment Low's esophagus present in the distal esophagus. The maximum longitudinal extent of these mucosal changes was 2 cm in length. Mucosa was biopsied with a cold forceps for histologyDistal esophagus, biopsies: Mild chronic active esophagogastritis. No evidence of intestinal metaplasia, dysplasia. Antrum, bx, Mild chronic gastritis    FOOT SURGERY Left     INCISION AND DRAINAGE OF WOUND Left 09/2007    spider bite       Outpatient Medications Marked as Taking for the 4/16/24 encounter (Office Visit) with Yuliana Vinson APRN   Medication Sig Dispense Refill    ascorbic acid (VITAMIN C) 1000 MG tablet Take 1 tablet by mouth Daily. 30 tablet 3    bumetanide (BUMEX) 2 MG tablet Take 1 tablet by mouth Daily. 90 tablet 0    calcitriol (ROCALTROL) 0.5 MCG capsule Take 1 capsule by mouth Daily. 90 capsule 4    carvedilol (COREG) 3.125 MG tablet Take 1 tablet twice a day by oral route. 60 tablet 4    Cholecalciferol  (D-5000) 125 MCG (5000 UT) tablet Take 1 tablet by mouth Daily Before Lunch. 30 tablet 2    Diclofenac Sodium (VOLTAREN) 1 % gel gel Apply 2 g topically to the appropriate area as directed 4 (Four) Times a Day As Needed. 300 g 11    DULoxetine (CYMBALTA) 60 MG capsule Take 1 capsule by mouth Daily. 90 capsule 4    famotidine (PEPCID) 20 MG tablet Take 1 tablet twice a day by oral route. 180 tablet 4    fluticasone (FLONASE) 50 MCG/ACT nasal spray 1 spray into the nostril(s) as directed by provider Daily. (Patient taking differently: 1 spray into the nostril(s) as directed by provider 2 (Two) Times a Day.) 16 g 1    Insulin Glargine (Lantus SoloStar) 100 UNIT/ML injection pen Inject 20 Units under the skin into the appropriate area as directed Every Evening. 15 mL 3    Insulin Regular Human, Conc, (HumuLIN R U-500 KwikPen) 500 UNIT/ML solution pen-injector CONCENTRATED injection Inject 15 Units under the skin into the appropriate area as directed 3 (Three) Times a Day Before Meals AND 40 Units Daily.      isosorbide mononitrate (IMDUR) 30 MG 24 hr tablet Take 1 tablet by mouth Daily.      magnesium hydroxide (Milk of Magnesia) 400 MG/5ML suspension Take 30mL by mouth once daily for 30 days. 900 mL 0    melatonin 3 MG tablet Take 2 tablets by mouth At Night As Needed for Sleep. 30 tablet 0    methylcellulose (Citrucel) oral powder Mix 5g as directed and drink twice daily for 90 days. 900 g 10    metoclopramide (REGLAN) 5 MG tablet Take 1 tablet by mouth 3 times a day.      midodrine (PROAMATINE) 2.5 MG tablet Take 1 tablet by mouth 3 (Three) Times a Day Before Meals.      ondansetron ODT (ZOFRAN-ODT) 4 MG disintegrating tablet Place 1 tablet on the tongue Every 8 (Eight) Hours As Needed for Nausea or Vomiting. 21 tablet 0    oxyCODONE (ROXICODONE) 10 MG tablet Take 1 tablet by mouth twice a day as needed 60 tablet 0    pantoprazole (Protonix) 40 MG EC tablet Take 1 tablet by mouth 2 (Two) Times a Day. 180 tablet 4     "polyethylene glycol (MIRALAX) 17 g packet Take 17 g by mouth Daily. Obtain OTC      pregabalin (LYRICA) 100 MG capsule Take 2 capsules by mouth Every Night.      pregabalin (LYRICA) 50 MG capsule Take 1 capsule by mouth Daily.      rosuvastatin (CRESTOR) 10 MG tablet Take 1 tablet by mouth Daily.      sennosides-docusate (PERICOLACE) 8.6-50 MG per tablet Take 1 tablet by mouth Every Night. Obtain OTC      sodium hypochlorite (DAKIN'S 1/4 STRENGTH) 0.125 % solution topical solution 0.125% Apply solution three times daily 473 mL 5    sucralfate (CARAFATE) 1 g tablet Take 1 tablet by mouth 3 times a day.      tamsulosin (FLOMAX) 0.4 MG capsule 24 hr capsule Take 1 capsule by mouth Daily. 90 capsule 1    timolol (TIMOPTIC) 0.5 % ophthalmic solution Administer 1 drop to both eyes 2 (Two) Times a Day.      valsartan (DIOVAN) 40 MG tablet Take 1 tablet by mouth Daily.      zinc sulfate (ZINCATE) 50 MG capsule Take 1 capsule by mouth Daily.       Allergies   Allergen Reactions    Cefepime Hives and Anaphylaxis    Bactrim [Sulfamethoxazole-Trimethoprim] Other (See Comments)     \"RENAL FAILURE\"    Vancomycin Itching    Zolpidem Unknown - High Severity     \"makes him crazy\"    Zolpidem Tartrate Unknown - Low Severity and Provider Review Needed    Metronidazole Rash     Social History     Socioeconomic History    Marital status:    Tobacco Use    Smoking status: Former     Current packs/day: 0.00     Types: Cigarettes     Quit date:      Years since quittin.3    Smokeless tobacco: Never    Tobacco comments:     smoked in [place]   Vaping Use    Vaping status: Never Used   Substance and Sexual Activity    Alcohol use: No    Drug use: No    Sexual activity: Defer     Family History   Problem Relation Age of Onset    Colon cancer Father     Heart disease Father     Colon cancer Sister     Colon polyps Sister     Alzheimer's disease Mother     Coronary artery disease Sister     Coronary artery disease Sister  "     Health Maintenance   Topic Date Due    Pneumococcal Vaccine 65+ (1 of 2 - PCV) 06/18/1962    ZOSTER VACCINE (1 of 2) Never done    DIABETIC EYE EXAM  07/18/2014    RSV Vaccine - Adults (1 - 1-dose 60+ series) Never done    DIABETIC FOOT EXAM  Never done    ANNUAL WELLNESS VISIT  11/03/2021    COVID-19 Vaccine (2 - 2023-24 season) 09/01/2023    INFLUENZA VACCINE  08/01/2024    BMI FOLLOWUP  08/10/2024    HEMOGLOBIN A1C  10/15/2024    LIPID PANEL  03/27/2025    URINE MICROALBUMIN  03/28/2025    TDAP/TD VACCINES (2 - Td or Tdap) 05/13/2029    COLORECTAL CANCER SCREENING  04/07/2034    HEPATITIS C SCREENING  Completed     Review of Systems   Constitutional:  Positive for fatigue. Negative for activity change, appetite change, chills, diaphoresis, fever and unexpected weight change.   HENT:  Negative for ear pain, hearing loss, mouth sores, sore throat, trouble swallowing and voice change.    Eyes: Negative.    Respiratory:  Negative for cough, choking, shortness of breath and wheezing.    Cardiovascular:  Negative for chest pain and palpitations.   Gastrointestinal:  Positive for abdominal pain, blood in stool and diarrhea. Negative for anal bleeding, constipation, nausea and vomiting.   Endocrine: Negative for cold intolerance and heat intolerance.   Genitourinary:  Negative for decreased urine volume, dysuria, frequency, hematuria and urgency.   Musculoskeletal:  Negative for back pain, gait problem and myalgias.   Skin:  Negative for color change, pallor and rash.   Allergic/Immunologic: Negative for food allergies and immunocompromised state.   Neurological:  Positive for weakness. Negative for dizziness, tremors, seizures, syncope, light-headedness, numbness and headaches.   Hematological:  Negative for adenopathy. Does not bruise/bleed easily.   Psychiatric/Behavioral:  Negative for agitation and confusion. The patient is not nervous/anxious.    All other systems reviewed and are negative.    Objective  "  Vitals:    04/16/24 1305   BP: 130/74   BP Location: Left arm   Pulse: 77   Temp: 97.5 °F (36.4 °C)   TempSrc: Temporal   SpO2: 100%   Weight: 125 kg (275 lb)   Height: 182.9 cm (72.01\")     Body mass index is 37.29 kg/m².  Physical Exam  Constitutional:       Appearance: He is well-developed.   HENT:      Head: Normocephalic and atraumatic.   Eyes:      Pupils: Pupils are equal, round, and reactive to light.   Neck:      Trachea: No tracheal deviation.   Cardiovascular:      Rate and Rhythm: Normal rate and regular rhythm.      Heart sounds: Normal heart sounds. No murmur heard.     No friction rub. No gallop.   Pulmonary:      Effort: Pulmonary effort is normal. No respiratory distress.      Breath sounds: Normal breath sounds. No wheezing or rales.   Chest:      Chest wall: No tenderness.   Abdominal:      General: Bowel sounds are normal. There is no distension.      Palpations: Abdomen is soft. Abdomen is not rigid.      Tenderness: There is no abdominal tenderness. There is no guarding or rebound.   Musculoskeletal:         General: No tenderness or deformity. Normal range of motion.      Cervical back: Normal range of motion and neck supple.   Skin:     General: Skin is warm and dry.      Coloration: Skin is not pale.      Findings: No rash.   Neurological:      Mental Status: He is alert and oriented to person, place, and time.      Deep Tendon Reflexes: Reflexes are normal and symmetric.   Psychiatric:         Behavior: Behavior normal.         Thought Content: Thought content normal.         Judgment: Judgment normal.       Assessment & Plan   Diagnoses and all orders for this visit:    1. Diarrhea, unspecified type (Primary)  -     Clostridioides difficile Toxin, PCR - Stool, Per Rectum    2. Pain of upper abdomen    3. Nausea    4. Nonsmoker    Will get stool cultures given the amount of watery stool noted in the colon on CT scan.  CT reviewed  If condition worsens or he does not improve he needs to go " back to the ER.   He is too weak for outpatient colonoscopy repeat at this time.   Suggest iron supplement for possible chronic GI bleeding due to AVMs. Follow blood counts with his PCP as well.   Will start a probiotic as well.   He is currently off of his Eliquis and ASA for 5 days.     Part of this note may be an electronic transcription/translation of spoken language to printed text using the Dragon Dictation System.  Body mass index is 37.29 kg/m².  Return in about 2 weeks (around 4/30/2024), or if symptoms worsen or fail to improve.           All risks, benefits, alternatives, and indications of colonoscopy and/or Endoscopy procedure have been discussed with the patient. Risks to include perforation of the colon requiring possible surgery or colostomy, risk of bleeding from biopsies or removal of colon tissue, possibility of missing a colon polyp or cancer, or adverse drug reaction.  Benefits to include the diagnosis and management of disease of the colon and rectum. Alternatives to include barium enema, radiographic evaluation, lab testing or no intervention. Pt verbalizes understanding and agrees.     Yuliana Vinson, APRN  4/16/2024  13:55 CDT          If you smoke or use tobacco, 4 minutes reading provided  Steps to Quit Smoking  Smoking tobacco can be harmful to your health and can affect almost every organ in your body. Smoking puts you, and those around you, at risk for developing many serious chronic diseases. Quitting smoking is difficult, but it is one of the best things that you can do for your health. It is never too late to quit.  What are the benefits of quitting smoking?  When you quit smoking, you lower your risk of developing serious diseases and conditions, such as:  Lung cancer or lung disease, such as COPD.  Heart disease.  Stroke.  Heart attack.  Infertility.  Osteoporosis and bone fractures.  Additionally, symptoms such as coughing, wheezing, and shortness of breath may get better  when you quit. You may also find that you get sick less often because your body is stronger at fighting off colds and infections. If you are pregnant, quitting smoking can help to reduce your chances of having a baby of low birth weight.  How do I get ready to quit?  When you decide to quit smoking, create a plan to make sure that you are successful. Before you quit:  Pick a date to quit. Set a date within the next two weeks to give you time to prepare.  Write down the reasons why you are quitting. Keep this list in places where you will see it often, such as on your bathroom mirror or in your car or wallet.  Identify the people, places, things, and activities that make you want to smoke (triggers) and avoid them. Make sure to take these actions:  Throw away all cigarettes at home, at work, and in your car.  Throw away smoking accessories, such as ashtrays and lighters.  Clean your car and make sure to empty the ashtray.  Clean your home, including curtains and carpets.  Tell your family, friends, and coworkers that you are quitting. Support from your loved ones can make quitting easier.  Talk with your health care provider about your options for quitting smoking.  Find out what treatment options are covered by your health insurance.  What strategies can I use to quit smoking?  Talk with your healthcare provider about different strategies to quit smoking. Some strategies include:  Quitting smoking altogether instead of gradually lessening how much you smoke over a period of time. Research shows that quitting “cold turkey” is more successful than gradually quitting.  Attending in-person counseling to help you build problem-solving skills. You are more likely to have success in quitting if you attend several counseling sessions. Even short sessions of 10 minutes can be effective.  Finding resources and support systems that can help you to quit smoking and remain smoke-free after you quit. These resources are most  helpful when you use them often. They can include:  Online chats with a counselor.  Telephone quitlines.  Printed self-help materials.  Support groups or group counseling.  Text messaging programs.  Mobile phone applications.  Taking medicines to help you quit smoking. (If you are pregnant or breastfeeding, talk with your health care provider first.) Some medicines contain nicotine and some do not. Both types of medicines help with cravings, but the medicines that include nicotine help to relieve withdrawal symptoms. Your health care provider may recommend:  Nicotine patches, gum, or lozenges.  Nicotine inhalers or sprays.  Non-nicotine medicine that is taken by mouth.  Talk with your health care provider about combining strategies, such as taking medicines while you are also receiving in-person counseling. Using these two strategies together makes you more likely to succeed in quitting than if you used either strategy on its own.  If you are pregnant or breastfeeding, talk with your health care provider about finding counseling or other support strategies to quit smoking. Do not take medicine to help you quit smoking unless told to do so by your health care provider.  What things can I do to make it easier to quit?  Quitting smoking might feel overwhelming at first, but there is a lot that you can do to make it easier. Take these important actions:  Reach out to your family and friends and ask that they support and encourage you during this time. Call telephone quitlines, reach out to support groups, or work with a counselor for support.  Ask people who smoke to avoid smoking around you.  Avoid places that trigger you to smoke, such as bars, parties, or smoke-break areas at work.  Spend time around people who do not smoke.  Lessen stress in your life, because stress can be a smoking trigger for some people. To lessen stress, try:  Exercising regularly.  Deep-breathing exercises.  Yoga.  Meditating.  Performing a  body scan. This involves closing your eyes, scanning your body from head to toe, and noticing which parts of your body are particularly tense. Purposefully relax the muscles in those areas.  Download or purchase mobile phone or tablet apps (applications) that can help you stick to your quit plan by providing reminders, tips, and encouragement. There are many free apps, such as QuitGuide from the CDC (Centers for Disease Control and Prevention). You can find other support for quitting smoking (smoking cessation) through smokefree.gov and other websites.  How will I feel when I quit smoking?  Within the first 24 hours of quitting smoking, you may start to feel some withdrawal symptoms. These symptoms are usually most noticeable 2-3 days after quitting, but they usually do not last beyond 2-3 weeks. Changes or symptoms that you might experience include:  Mood swings.  Restlessness, anxiety, or irritation.  Difficulty concentrating.  Dizziness.  Strong cravings for sugary foods in addition to nicotine.  Mild weight gain.  Constipation.  Nausea.  Coughing or a sore throat.  Changes in how your medicines work in your body.  A depressed mood.  Difficulty sleeping (insomnia).  After the first 2-3 weeks of quitting, you may start to notice more positive results, such as:  Improved sense of smell and taste.  Decreased coughing and sore throat.  Slower heart rate.  Lower blood pressure.  Clearer skin.  The ability to breathe more easily.  Fewer sick days.  Quitting smoking is very challenging for most people. Do not get discouraged if you are not successful the first time. Some people need to make many attempts to quit before they achieve long-term success. Do your best to stick to your quit plan, and talk with your health care provider if you have any questions or concerns.  This information is not intended to replace advice given to you by your health care provider. Make sure you discuss any questions you have with your  health care provider.  Document Released: 12/12/2002 Document Revised: 08/15/2017 Document Reviewed: 05/03/2016  [employer] Interactive Patient Education © 2017 [employer]

## 2024-04-18 ENCOUNTER — TRANSCRIBE ORDERS (OUTPATIENT)
Dept: ADMINISTRATIVE | Facility: HOSPITAL | Age: 69
End: 2024-04-18
Payer: COMMERCIAL

## 2024-04-18 ENCOUNTER — LAB (OUTPATIENT)
Dept: LAB | Facility: HOSPITAL | Age: 69
End: 2024-04-18
Payer: COMMERCIAL

## 2024-04-18 ENCOUNTER — TELEPHONE (OUTPATIENT)
Age: 69
End: 2024-04-18
Payer: COMMERCIAL

## 2024-04-18 DIAGNOSIS — I10 ESSENTIAL HYPERTENSION, MALIGNANT: ICD-10-CM

## 2024-04-18 DIAGNOSIS — D50.9 IRON DEFICIENCY ANEMIA, UNSPECIFIED IRON DEFICIENCY ANEMIA TYPE: ICD-10-CM

## 2024-04-18 DIAGNOSIS — N18.32 CHRONIC KIDNEY DISEASE (CKD) STAGE G3B/A1, MODERATELY DECREASED GLOMERULAR FILTRATION RATE (GFR) BETWEEN 30-44 ML/MIN/1.73 SQUARE METER AND ALBUMINURIA CREATININE RATIO LESS THAN 30 MG/G (CMS/H*: ICD-10-CM

## 2024-04-18 DIAGNOSIS — N18.32 CHRONIC KIDNEY DISEASE (CKD) STAGE G3B/A1, MODERATELY DECREASED GLOMERULAR FILTRATION RATE (GFR) BETWEEN 30-44 ML/MIN/1.73 SQUARE METER AND ALBUMINURIA CREATININE RATIO LESS THAN 30 MG/G (CMS/H*: Primary | ICD-10-CM

## 2024-04-18 LAB
ANION GAP SERPL CALCULATED.3IONS-SCNC: 11 MMOL/L (ref 5–15)
BASOPHILS # BLD AUTO: 0.03 10*3/MM3 (ref 0–0.2)
BASOPHILS NFR BLD AUTO: 0.5 % (ref 0–1.5)
BUN SERPL-MCNC: 19 MG/DL (ref 8–23)
BUN/CREAT SERPL: 11.4 (ref 7–25)
CALCIUM SPEC-SCNC: 8.5 MG/DL (ref 8.6–10.5)
CHLORIDE SERPL-SCNC: 103 MMOL/L (ref 98–107)
CO2 SERPL-SCNC: 25 MMOL/L (ref 22–29)
CREAT SERPL-MCNC: 1.67 MG/DL (ref 0.76–1.27)
DEPRECATED RDW RBC AUTO: 42.6 FL (ref 37–54)
EGFRCR SERPLBLD CKD-EPI 2021: 44.6 ML/MIN/1.73
EOSINOPHIL # BLD AUTO: 0.37 10*3/MM3 (ref 0–0.4)
EOSINOPHIL NFR BLD AUTO: 6 % (ref 0.3–6.2)
ERYTHROCYTE [DISTWIDTH] IN BLOOD BY AUTOMATED COUNT: 13.5 % (ref 12.3–15.4)
FERRITIN SERPL-MCNC: 368.4 NG/ML (ref 30–400)
GLUCOSE SERPL-MCNC: 319 MG/DL (ref 65–99)
HCT VFR BLD AUTO: 25 % (ref 37.5–51)
HGB BLD-MCNC: 8.1 G/DL (ref 13–17.7)
HYPOCHROMIA BLD QL: NORMAL
IMM GRANULOCYTES # BLD AUTO: 0.02 10*3/MM3 (ref 0–0.05)
IMM GRANULOCYTES NFR BLD AUTO: 0.3 % (ref 0–0.5)
IRON 24H UR-MRATE: 31 MCG/DL (ref 59–158)
IRON SATN MFR SERPL: 11 % (ref 20–50)
LYMPHOCYTES # BLD AUTO: 1.14 10*3/MM3 (ref 0.7–3.1)
LYMPHOCYTES NFR BLD AUTO: 18.5 % (ref 19.6–45.3)
MCH RBC QN AUTO: 27.9 PG (ref 26.6–33)
MCHC RBC AUTO-ENTMCNC: 32.4 G/DL (ref 31.5–35.7)
MCV RBC AUTO: 86.2 FL (ref 79–97)
MONOCYTES # BLD AUTO: 0.8 10*3/MM3 (ref 0.1–0.9)
MONOCYTES NFR BLD AUTO: 13 % (ref 5–12)
NEUTROPHILS NFR BLD AUTO: 3.81 10*3/MM3 (ref 1.7–7)
NEUTROPHILS NFR BLD AUTO: 61.7 % (ref 42.7–76)
NRBC BLD AUTO-RTO: 0 /100 WBC (ref 0–0.2)
PLATELET # BLD AUTO: 88 10*3/MM3 (ref 140–450)
PMV BLD AUTO: 12.8 FL (ref 6–12)
POTASSIUM SERPL-SCNC: 4.1 MMOL/L (ref 3.5–5.2)
RBC # BLD AUTO: 2.9 10*6/MM3 (ref 4.14–5.8)
SMALL PLATELETS BLD QL SMEAR: NORMAL
SODIUM SERPL-SCNC: 139 MMOL/L (ref 136–145)
TIBC SERPL-MCNC: 291 MCG/DL (ref 298–536)
TRANSFERRIN SERPL-MCNC: 195 MG/DL (ref 200–360)
WBC MORPH BLD: NORMAL
WBC NRBC COR # BLD AUTO: 6.17 10*3/MM3 (ref 3.4–10.8)

## 2024-04-18 PROCEDURE — 84466 ASSAY OF TRANSFERRIN: CPT

## 2024-04-18 PROCEDURE — 85025 COMPLETE CBC W/AUTO DIFF WBC: CPT

## 2024-04-18 PROCEDURE — 83540 ASSAY OF IRON: CPT

## 2024-04-18 PROCEDURE — 80048 BASIC METABOLIC PNL TOTAL CA: CPT

## 2024-04-18 PROCEDURE — 82728 ASSAY OF FERRITIN: CPT

## 2024-04-18 PROCEDURE — 36415 COLL VENOUS BLD VENIPUNCTURE: CPT

## 2024-04-18 PROCEDURE — 85007 BL SMEAR W/DIFF WBC COUNT: CPT

## 2024-04-18 NOTE — TELEPHONE ENCOUNTER
I received a voicemail from Joan asking me to return her call about scheduling a hospital follow-up with Dr. Garrett.  I scheduled him on Friday 5/3/24 at 0915.  Joan said he saw EVELYNE William nephrology and he recommended he contact Dr. Garrett to schedule appt sooner than later, re: concern for C-diff.  Testing has been ordered but no BM recently.  He finished his antibiotic but he doesn't feel any better.  He saw Dr. Shetty today and he's at [facility] getting labs. I told her I will make sure Dr. Garrett is aware of this phone call.

## 2024-04-19 NOTE — PLAN OF CARE
Problem: Patient Care Overview  Goal: Plan of Care Review  Outcome: Ongoing (interventions implemented as appropriate)   11/04/19 0507   Coping/Psychosocial   Plan of Care Reviewed With patient   Plan of Care Review   Progress improving   OTHER   Outcome Summary safety maintained. vss. pt tolerating iv ABX well. dressing on foot remained clean, dry and intact. pt has been up to the chair intermittently throughtout the night. continuing to monitor labs. no signs of distress noted. will continue to monitor.      Goal: Individualization and Mutuality  Outcome: Ongoing (interventions implemented as appropriate)    Goal: Discharge Needs Assessment  Outcome: Ongoing (interventions implemented as appropriate)      Problem: Pain, Chronic (Adult)  Goal: Acceptable Pain/Comfort Level and Functional Ability  Outcome: Ongoing (interventions implemented as appropriate)      Problem: Fall Risk (Adult)  Goal: Absence of Fall  Outcome: Ongoing (interventions implemented as appropriate)      Problem: Renal Failure/Kidney Injury, Acute (Adult)  Goal: Signs and Symptoms of Listed Potential Problems Will be Absent, Minimized or Managed (Renal Failure/Kidney Injury, Acute)  Outcome: Ongoing (interventions implemented as appropriate)      Problem: Wound (Includes Pressure Injury) (Adult)  Goal: Signs and Symptoms of Listed Potential Problems Will be Absent, Minimized or Managed (Wound)  Outcome: Ongoing (interventions implemented as appropriate)         no

## 2024-04-20 ENCOUNTER — HOSPITAL ENCOUNTER (EMERGENCY)
Facility: HOSPITAL | Age: 69
Discharge: HOME | End: 2024-04-20
Attending: FAMILY MEDICINE
Payer: COMMERCIAL

## 2024-04-20 ENCOUNTER — APPOINTMENT (OUTPATIENT)
Dept: CT IMAGING | Facility: HOSPITAL | Age: 69
End: 2024-04-20
Payer: COMMERCIAL

## 2024-04-20 VITALS
HEART RATE: 75 BPM | SYSTOLIC BLOOD PRESSURE: 129 MMHG | WEIGHT: 296 LBS | RESPIRATION RATE: 18 BRPM | DIASTOLIC BLOOD PRESSURE: 73 MMHG | BODY MASS INDEX: 40.09 KG/M2 | TEMPERATURE: 98 F | HEIGHT: 72 IN | OXYGEN SATURATION: 97 %

## 2024-04-20 DIAGNOSIS — R10.84 GENERALIZED ABDOMINAL PAIN: Primary | ICD-10-CM

## 2024-04-20 DIAGNOSIS — K59.09 OTHER CONSTIPATION: ICD-10-CM

## 2024-04-20 DIAGNOSIS — E11.65 HYPERGLYCEMIA DUE TO DIABETES MELLITUS: ICD-10-CM

## 2024-04-20 LAB
ALBUMIN SERPL-MCNC: 3.8 G/DL (ref 3.5–5.2)
ALBUMIN/GLOB SERPL: 1.6 G/DL
ALP SERPL-CCNC: 125 U/L (ref 39–117)
ALT SERPL W P-5'-P-CCNC: 22 U/L (ref 1–41)
ANION GAP SERPL CALCULATED.3IONS-SCNC: 9 MMOL/L (ref 5–15)
APTT PPP: 29.1 SECONDS (ref 24.5–36)
AST SERPL-CCNC: 18 U/L (ref 1–40)
BASOPHILS # BLD AUTO: 0.05 10*3/MM3 (ref 0–0.2)
BASOPHILS NFR BLD AUTO: 0.6 % (ref 0–1.5)
BILIRUB SERPL-MCNC: 0.5 MG/DL (ref 0–1.2)
BUN SERPL-MCNC: 14 MG/DL (ref 8–23)
BUN/CREAT SERPL: 9.2 (ref 7–25)
CALCIUM SPEC-SCNC: 8.7 MG/DL (ref 8.6–10.5)
CHLORIDE SERPL-SCNC: 105 MMOL/L (ref 98–107)
CO2 SERPL-SCNC: 27 MMOL/L (ref 22–29)
CREAT SERPL-MCNC: 1.52 MG/DL (ref 0.76–1.27)
DEPRECATED RDW RBC AUTO: 42.5 FL (ref 37–54)
EGFRCR SERPLBLD CKD-EPI 2021: 49.9 ML/MIN/1.73
EOSINOPHIL # BLD AUTO: 0.43 10*3/MM3 (ref 0–0.4)
EOSINOPHIL NFR BLD AUTO: 5.6 % (ref 0.3–6.2)
ERYTHROCYTE [DISTWIDTH] IN BLOOD BY AUTOMATED COUNT: 13.6 % (ref 12.3–15.4)
GLOBULIN UR ELPH-MCNC: 2.4 GM/DL
GLUCOSE SERPL-MCNC: 327 MG/DL (ref 65–99)
HCT VFR BLD AUTO: 25.8 % (ref 37.5–51)
HGB BLD-MCNC: 8.4 G/DL (ref 13–17.7)
IMM GRANULOCYTES # BLD AUTO: 0.1 10*3/MM3 (ref 0–0.05)
IMM GRANULOCYTES NFR BLD AUTO: 1.3 % (ref 0–0.5)
INR PPP: 1.02 (ref 0.91–1.09)
LIPASE SERPL-CCNC: 6 U/L (ref 13–60)
LYMPHOCYTES # BLD AUTO: 1.46 10*3/MM3 (ref 0.7–3.1)
LYMPHOCYTES NFR BLD AUTO: 18.9 % (ref 19.6–45.3)
MAGNESIUM SERPL-MCNC: 2 MG/DL (ref 1.6–2.4)
MCH RBC QN AUTO: 28.2 PG (ref 26.6–33)
MCHC RBC AUTO-ENTMCNC: 32.6 G/DL (ref 31.5–35.7)
MCV RBC AUTO: 86.6 FL (ref 79–97)
MONOCYTES # BLD AUTO: 0.91 10*3/MM3 (ref 0.1–0.9)
MONOCYTES NFR BLD AUTO: 11.8 % (ref 5–12)
NEUTROPHILS NFR BLD AUTO: 4.77 10*3/MM3 (ref 1.7–7)
NEUTROPHILS NFR BLD AUTO: 61.8 % (ref 42.7–76)
NRBC BLD AUTO-RTO: 0 /100 WBC (ref 0–0.2)
PLATELET # BLD AUTO: 93 10*3/MM3 (ref 140–450)
PMV BLD AUTO: 12.7 FL (ref 6–12)
POTASSIUM SERPL-SCNC: 4.3 MMOL/L (ref 3.5–5.2)
PROT SERPL-MCNC: 6.2 G/DL (ref 6–8.5)
PROTHROMBIN TIME: 13.8 SECONDS (ref 11.8–14.8)
RBC # BLD AUTO: 2.98 10*6/MM3 (ref 4.14–5.8)
SODIUM SERPL-SCNC: 141 MMOL/L (ref 136–145)
WBC NRBC COR # BLD AUTO: 7.72 10*3/MM3 (ref 3.4–10.8)

## 2024-04-20 PROCEDURE — 83690 ASSAY OF LIPASE: CPT | Performed by: FAMILY MEDICINE

## 2024-04-20 PROCEDURE — 96375 TX/PRO/DX INJ NEW DRUG ADDON: CPT

## 2024-04-20 PROCEDURE — 99285 EMERGENCY DEPT VISIT HI MDM: CPT

## 2024-04-20 PROCEDURE — 25010000002 MORPHINE PER 10 MG: Performed by: FAMILY MEDICINE

## 2024-04-20 PROCEDURE — 85610 PROTHROMBIN TIME: CPT | Performed by: FAMILY MEDICINE

## 2024-04-20 PROCEDURE — 80053 COMPREHEN METABOLIC PANEL: CPT | Performed by: FAMILY MEDICINE

## 2024-04-20 PROCEDURE — 74177 CT ABD & PELVIS W/CONTRAST: CPT

## 2024-04-20 PROCEDURE — 83735 ASSAY OF MAGNESIUM: CPT | Performed by: FAMILY MEDICINE

## 2024-04-20 PROCEDURE — 96374 THER/PROPH/DIAG INJ IV PUSH: CPT

## 2024-04-20 PROCEDURE — 85730 THROMBOPLASTIN TIME PARTIAL: CPT | Performed by: FAMILY MEDICINE

## 2024-04-20 PROCEDURE — 25510000001 IOPAMIDOL 61 % SOLUTION: Performed by: FAMILY MEDICINE

## 2024-04-20 PROCEDURE — 36415 COLL VENOUS BLD VENIPUNCTURE: CPT

## 2024-04-20 PROCEDURE — 85025 COMPLETE CBC W/AUTO DIFF WBC: CPT | Performed by: FAMILY MEDICINE

## 2024-04-20 PROCEDURE — 25010000002 ONDANSETRON PER 1 MG: Performed by: FAMILY MEDICINE

## 2024-04-20 RX ORDER — MORPHINE SULFATE 2 MG/ML
2 INJECTION, SOLUTION INTRAMUSCULAR; INTRAVENOUS ONCE
Status: COMPLETED | OUTPATIENT
Start: 2024-04-20 | End: 2024-04-20

## 2024-04-20 RX ORDER — ONDANSETRON 2 MG/ML
4 INJECTION INTRAMUSCULAR; INTRAVENOUS ONCE
Status: COMPLETED | OUTPATIENT
Start: 2024-04-20 | End: 2024-04-20

## 2024-04-20 RX ORDER — FAMOTIDINE 10 MG/ML
20 INJECTION, SOLUTION INTRAVENOUS ONCE
Status: COMPLETED | OUTPATIENT
Start: 2024-04-20 | End: 2024-04-20

## 2024-04-20 RX ORDER — SODIUM CHLORIDE 0.9 % (FLUSH) 0.9 %
10 SYRINGE (ML) INJECTION AS NEEDED
Status: DISCONTINUED | OUTPATIENT
Start: 2024-04-20 | End: 2024-04-20 | Stop reason: HOSPADM

## 2024-04-20 RX ADMIN — ONDANSETRON 4 MG: 2 INJECTION INTRAMUSCULAR; INTRAVENOUS at 05:16

## 2024-04-20 RX ADMIN — IOPAMIDOL 100 ML: 612 INJECTION, SOLUTION INTRAVENOUS at 06:10

## 2024-04-20 RX ADMIN — FAMOTIDINE 20 MG: 10 INJECTION INTRAVENOUS at 05:16

## 2024-04-20 RX ADMIN — MORPHINE SULFATE 2 MG: 2 INJECTION, SOLUTION INTRAMUSCULAR; INTRAVENOUS at 05:16

## 2024-04-20 NOTE — ED PROVIDER NOTES
This patient was seen by Dr. Molina please refer to his records for further details came in with abdominal pain workup essentially negative.  He is mildly constipated is given discharge home with a follow-up with the primary MD.This patient presents with abdominal pain arrived hemodynamically stable.  Presentation not consistent with other acute, emergent causes of abdominal pain at this time.  Low suspicion for dissection there is no widened mediastinum, hypotension, pulses deficits, and no pain tearing through to the back.  Low suspicion for nephrolithiasis without flank pain radiating to the groin, hematuria, or any history of kidney stones.  Low suspicion for viscus perforation without evidence of guarding, rebound, or rigidity that would be concerning for an acute abdomen.  Low concern for appendicitis as pain did not radiate from the umbilicus to the right lower quadrant, negative Rovsing's sign, no severe constipation on exam.  Low concern for cholecystitis with negative Sagastume sign, no history of gallstones, and no recent pale stools or pain after eating.  Low concern for ulcerative disease as pain is not consistent with eating  foods and is not relieved with patient refraining from eatingand there is no hematemesis or melena. Low suspicion for GI bleeding as there is no melena hematemesis or hematochezia and patient's hemodynamics are stable and hemoglobin is at its baseline.  Low suspicion for gastroenteritis without evidence of diarrhea, fevers, or recent uncooked food exposure.  There is low concern for ischemic bowel with no significant abdominal pain normal vitals and no acidosis no history of peripheral vascular disease or cardiac dysrhythmias.           Faisal Pascual MD  04/20/24 8490       Faisal Pascual MD  04/20/24 8752

## 2024-04-20 NOTE — DISCHARGE INSTRUCTIONS
It was very nice to meet you, Erick. Thank you for allowing us to take care of you today at [facility].     Today you were seen in the emergency department for your symptoms. Please understand that an ER evaluation is just the start of your evaluation. We do the best we can, but we are often unable to fully find what is causing your symptoms from one evaluation.  Because of this, the goal is to determine whether you need to be evaluated in the hospital or if it is safe for you to go home and see other doctors provided such as primary care physicians or specialist on an outpatient basis.      Like we discussed, I strongly urge that you follow up with your primary care doctor. Please call their office to set up an appointment as soon as possible so that you can be re-evaluated for improvement in your symptoms or for any other questions.  I have provided the information needed, including phone number, to call to set up an appointment below in these discharge papers.      Educational material has also been provided in the following pages regarding what we have discussed today.      Please return to the emergency room within 12-48 hours if you experience symptoms such as the following:   Fever, chills, chest pain or shortness of breath, pain with inspiration/expiration, pain that travels to your arms, neck or back, nausea, vomiting, severe headache, tearing pain in your chest, dizziness, feel as though you are about to pass out, OR if you have any worsening symptoms, or any other concerns.    Risks and benefits of treatments given and alternative treatment options discussed with patient/ I answered all the questions in simple, plain language, and there was voiced understanding and agreement with plan of care. There were no further questions. Differential diagnosis discussed. Patient was advised that the practice of medicine is not always an exact science, and sometimes tests, physical exam, or history may not  show the underlying conditions with certainty. Additionally, the condition may change or show itself later after initial presentation. There was also expressed understanding and agreement with this limitation of emergency medicine practice. Patient was asked to return to ED if any problem or issues or if condition worsens or does not improved. Patient agreed to follow up with PCP/specialist as advised, or return to ED if unable to see a provider in a timely fashion for continued symptoms.

## 2024-04-20 NOTE — ED PROVIDER NOTES
HPI:     68-year-old white male returns back to the emergency room after being discharged 4 to 5 days ago still without a bowel movement.  Patient has had over a week history of not having a bowel movement and now is in a lot of pain.  Patient was admitted previously for anemia and GI bleeding where he had surgical clips placed in his colon due to the GI bleeding and the resections.  Patient did not complete a full colonoscopy due to a lot of stool that was still present.  Patient denies any fever chills or night sweats but states his abdominal pain now is 9-10 out of 10.  Patient was supposed to give a stool sample to further evaluation for his loose stool that he had previously but has not had a bowel movement.    REVIEW OF SYSTEMS  CONSTITUTIONAL:  No complaints of fever, chills,or weakness  EYES:  No complaints of discharge   ENT: No complaints of sore throat or ear pain  CARDIOVASCULAR:  No complaints of chest pain, palpitations, or swelling  RESPIRATORY:  No complaints of cough or shortness of breath  GI: Positive for abdominal pain, and constipation.  MUSCULOSKELETAL:  No complaints of back pain  SKIN:  No complaints of rash  NEUROLOGIC:  No complaints of headache, focal weakness, or sensory changes  ENDOCRINE:  No complaints of polyuria or polydipsia  LYMPHATIC:  No complaints of swollen glands  GENITOURINARY: No complaints of urinary frequency or hematuria      PAST MEDICAL HISTORY  Past Medical History:   Diagnosis Date    Arthritis     Autonomic disease     CAD (coronary artery disease) 02/06/2017    Cervical radiculopathy 09/16/2021    Chronic constipation with acute exaccerbation 05/10/2021    Coronary artery disease     Degeneration of cervical intervertebral disc 08/11/2021    Diabetes mellitus     Diabetic foot ulcer 08/31/2020    Diabetic polyneuropathy associated with type 2 diabetes mellitus 01/18/2021    Elevated cholesterol     Gastroesophageal reflux disease 05/13/2019    Headache     HTN  (hypertension), benign 2017    Hyperlipidemia     Hypertension     Mixed hyperlipidemia 2017    Multiple lung nodules 2020    5mm, 9 mm RLL identified 2020, not present 10/2019.    Myocardial infarction     Osteomyelitis 2020    Osteomyelitis of fifth toe of right foot 10/07/2019    Pancreatitis     Persistent insomnia 2020    Renal disorder     Sleep apnea 2017    Sleep apnea with use of continuous positive airway pressure (CPAP)     NON-COMPLIANT    Slow transit constipation 2019    Spinal stenosis in cervical region 2021    Vitamin D deficiency 2021       FAMILY HISTORY  Family History   Problem Relation Age of Onset    Colon cancer Father     Heart disease Father     Colon cancer Sister     Colon polyps Sister     Alzheimer's disease Mother     Coronary artery disease Sister     Coronary artery disease Sister        SOCIAL HISTORY  Social History     Socioeconomic History    Marital status:    Tobacco Use    Smoking status: Former     Current packs/day: 0.00     Types: Cigarettes     Quit date:      Years since quittin.3    Smokeless tobacco: Never    Tobacco comments:     smoked in highschool   Vaping Use    Vaping status: Never Used   Substance and Sexual Activity    Alcohol use: No    Drug use: No    Sexual activity: Defer       IMMUNIZATION HISTORY  Deferred to primary care physician.    SURGICAL HISTORY  Past Surgical History:   Procedure Laterality Date    ABDOMINAL SURGERY      AMPUTATION FOOT / TOE Left 10/2021    5th digit     ANTERIOR CERVICAL DISCECTOMY W/ FUSION N/A 2022    Procedure: CERVICAL DISCECTOMY ANTERIOR WITH FUSION C5-6 with possible plating of C5-7 with neuromonitoring and 1 c-arm;  Surgeon: Karel Soliz MD;  Location: [facility];  Service: Neurosurgery;  Laterality: N/A;    APPENDECTOMY      BACK SURGERY      CARDIAC CATHETERIZATION Left 2021    Procedure: Left Heart Cath w poss intervention left  anatomical snuff box acess;  Surgeon: Omkar Charles DO;  Location: [facility] CATH INVASIVE LOCATION;  Service: Cardiology;  Laterality: Left;    CARDIAC SURGERY      CATARACT EXTRACTION      CERVICAL SPINE SURGERY      COLONOSCOPY N/A 01/31/2017    Normal exam repeat in 5 years    COLONOSCOPY N/A 02/11/2019    Mild acute inflammation    COLONOSCOPY N/A 04/07/2024    2 areas at 10 and 20 cm with friability ulceration 2 clips placed at 20 cm and 4 clips at 10 cm poor prep normal mucosa,mild eroisions and ulcerations in visible vessels    COLONOSCOPY W/ POLYPECTOMY  03/04/2014    Hyperplastic polyp    CORONARY ARTERY BYPASS GRAFT  10/2015    ENDOSCOPY  04/13/2011    Gastritis with hemorrhage    ENDOSCOPY N/A 05/05/2017    Normal exam    ENDOSCOPY N/A 02/11/2019    Gastritis    ENDOSCOPY N/A 09/01/2020    Non-erosive gastritis with hemorrhage    ENDOSCOPY N/A 02/10/2021    Esophagitis    ENDOSCOPY N/A 04/11/2024    There were esophageal mucosal changes suspicious for short-segment Low's esophagus present in the distal esophagus. The maximum longitudinal extent of these mucosal changes was 2 cm in length. Mucosa was biopsied with a cold forceps for histologyDistal esophagus, biopsies: Mild chronic active esophagogastritis. No evidence of intestinal metaplasia, dysplasia. Antrum, bx, Mild chronic gastritis    FOOT SURGERY Left     INCISION AND DRAINAGE OF WOUND Left 09/2007    spider bite       CURRENT MEDICATIONS  No current facility-administered medications for this encounter.    Current Outpatient Medications:     ascorbic acid (VITAMIN C) 1000 MG tablet, Take 1 tablet by mouth Daily., Disp: 30 tablet, Rfl: 3    bumetanide (BUMEX) 2 MG tablet, Take 1 tablet by mouth Daily., Disp: 90 tablet, Rfl: 0    calcitriol (ROCALTROL) 0.5 MCG capsule, Take 1 capsule by mouth Daily., Disp: 90 capsule, Rfl: 4    carvedilol (COREG) 3.125 MG tablet, Take 1 tablet twice a day by oral route., Disp: 60 tablet, Rfl: 4     Cholecalciferol (D-5000) 125 MCG (5000 UT) tablet, Take 1 tablet by mouth Daily Before Lunch., Disp: 30 tablet, Rfl: 2    Diclofenac Sodium (VOLTAREN) 1 % gel gel, Apply 2 g topically to the appropriate area as directed 4 (Four) Times a Day As Needed., Disp: 300 g, Rfl: 11    donepezil (ARICEPT) 10 MG tablet, Take 1 tablet by mouth Daily. (Patient not taking: Reported on 4/16/2024), Disp: 90 tablet, Rfl: 4    DULoxetine (CYMBALTA) 60 MG capsule, Take 1 capsule by mouth Daily., Disp: 90 capsule, Rfl: 4    famotidine (PEPCID) 20 MG tablet, Take 1 tablet twice a day by oral route., Disp: 180 tablet, Rfl: 4    fluticasone (FLONASE) 50 MCG/ACT nasal spray, 1 spray into the nostril(s) as directed by provider Daily. (Patient taking differently: 1 spray into the nostril(s) as directed by provider 2 (Two) Times a Day.), Disp: 16 g, Rfl: 1    fluticasone (FLONASE) 50 MCG/ACT nasal spray, Instill 1 spray in each nostril as directed by provider Daily., Disp: 16 g, Rfl: 1    fluticasone (FLONASE) 50 MCG/ACT nasal spray, Instill 1 spray into the nostril(s) as directed by provider Daily., Disp: 16 g, Rfl: 1    Insulin Glargine (Lantus SoloStar) 100 UNIT/ML injection pen, Inject 20 Units under the skin into the appropriate area as directed Every Evening., Disp: 15 mL, Rfl: 3    Insulin Glargine (Lantus SoloStar) 100 UNIT/ML injection pen, Inject 20 Units under the skin into the appropriate area as directed Every Night., Disp: 15 mL, Rfl: 3    Insulin Regular Human, Conc, (HumuLIN R U-500 KwikPen) 500 UNIT/ML solution pen-injector CONCENTRATED injection, Inject 15 Units under the skin into the appropriate area as directed 3 (Three) Times a Day Before Meals AND 40 Units Daily., Disp: , Rfl:     Iron-Vitamin C (Vitron-C)  MG tablet, Take 1 tablet twice a day by oral route., Disp: 60 tablet, Rfl: 2    isosorbide mononitrate (IMDUR) 30 MG 24 hr tablet, Take 1 tablet by mouth Daily., Disp: , Rfl:     magnesium hydroxide (Milk of  Magnesia) 400 MG/5ML suspension, Take 30mL by mouth once daily for 30 days., Disp: 900 mL, Rfl: 0    melatonin 3 MG tablet, Take 2 tablets by mouth At Night As Needed for Sleep., Disp: 30 tablet, Rfl: 0    methylcellulose (Citrucel) oral powder, Mix 5g as directed and drink twice daily for 90 days., Disp: 900 g, Rfl: 10    metoclopramide (REGLAN) 5 MG tablet, Take 1 tablet by mouth 3 times a day., Disp: , Rfl:     midodrine (PROAMATINE) 2.5 MG tablet, Take 1 tablet by mouth 3 (Three) Times a Day Before Meals., Disp: , Rfl:     ondansetron ODT (ZOFRAN-ODT) 4 MG disintegrating tablet, Place 1 tablet on the tongue Every 8 (Eight) Hours As Needed for Nausea or Vomiting., Disp: 21 tablet, Rfl: 0    oxyCODONE (ROXICODONE) 10 MG tablet, Take 1 tablet by mouth twice a day as needed, Disp: 60 tablet, Rfl: 0    oxyCODONE (ROXICODONE) 10 MG tablet, Take 1 tablet by mouth 2 (Two) Times a Day As Needed., Disp: 55 tablet, Rfl: 0    pantoprazole (Protonix) 40 MG EC tablet, Take 1 tablet by mouth 2 (Two) Times a Day., Disp: 180 tablet, Rfl: 4    polyethylene glycol (MIRALAX) 17 g packet, Take 17 g by mouth Daily. Obtain OTC, Disp: , Rfl:     pregabalin (LYRICA) 100 MG capsule, Take 2 capsules by mouth Every Night., Disp: , Rfl:     pregabalin (LYRICA) 50 MG capsule, Take 1 capsule by mouth Daily., Disp: , Rfl:     rosuvastatin (CRESTOR) 10 MG tablet, Take 1 tablet by mouth Daily., Disp: , Rfl:     sennosides-docusate (PERICOLACE) 8.6-50 MG per tablet, Take 1 tablet by mouth Every Night. Obtain OTC, Disp: , Rfl:     sodium hypochlorite (DAKIN'S 1/4 STRENGTH) 0.125 % solution topical solution 0.125%, Apply solution three times daily, Disp: 473 mL, Rfl: 5    sucralfate (CARAFATE) 1 g tablet, Take 1 tablet by mouth 3 times a day., Disp: , Rfl:     tamsulosin (FLOMAX) 0.4 MG capsule 24 hr capsule, Take 1 capsule by mouth Daily., Disp: 90 capsule, Rfl: 1    timolol (TIMOPTIC) 0.5 % ophthalmic solution, Administer 1 drop to both eyes 2  "(Two) Times a Day., Disp: , Rfl:     valsartan (DIOVAN) 40 MG tablet, Take 1 tablet by mouth Daily., Disp: , Rfl:     zinc sulfate (ZINCATE) 50 MG capsule, Take 1 capsule by mouth Daily., Disp: , Rfl:     ALLERGIES  Allergies   Allergen Reactions    Cefepime Hives and Anaphylaxis    Bactrim [Sulfamethoxazole-Trimethoprim] Other (See Comments)     \"RENAL FAILURE\"    Vancomycin Itching    Zolpidem Unknown - High Severity     \"makes him crazy\"    Zolpidem Tartrate Unknown - Low Severity and Provider Review Needed    Metronidazole Rash       ABDOMINAL EXAM    VITAL SIGNS:   /73   Pulse 75   Temp 98 °F (36.7 °C)   Resp 18   Ht 182.9 cm (72\")   Wt 134 kg (296 lb)   SpO2 97%   BMI 40.14 kg/m²     Constitutional: Patient is alert and in no distress.  Patient with moderate to severe abdominal discomfort.    ENT: There is a normal pharynx with no acute erythema or exudate and oral mucosa is moist.  Nose is clear with no drainage.  Tympanic membranes intact and non-erythemic    Cardiovascular: S1-S2 regular rate and rhythm no murmurs rubs or gallops pulses are equal bilaterally and there is no pitting edema    Respiratory: Patient is clear to auscultation bilaterally with no wheezing or rhonchi.  Chest wall is nontender.  There is no external lesions on the chest.  There is no crepitance    Gastrointestinal: Obese abdomen which is tender to palpation diffusely with no abdominal distention or fluid wave.    Genitourinary: Patient is voiding appropriately.    Integument: No acute lesions noted color appears to be normal    Marion Coma Scale: Total score 15    Neurological: Patient is alert and oriented x4 in no acute findings noted.  Speech is fluent and cognition is normal.  No evidence of acute CVA.  Cranial nerves II through XII intact.  Patient with normal motor function as well as reflexes and sensation    Psychiatric: Normal affect and mood.            RADIOLOGY/PROCEDURES        CT Abdomen Pelvis With " Contrast   Final Result   1. Small to moderate bilateral pleural effusions have increased in size.   There is associated atelectasis.   2. Edema in the subcutaneous soft tissues may represent volume overload.   3. No acute abnormality in the abdomen or pelvis.           This report was signed and finalized on 4/20/2024 7:27 AM by Eran Christina.                 FUTURE APPOINTMENTS     Future Appointments   Date Time Provider Department Center   5/2/2024  9:30 AM Yuliana Vinson APRN [facility] GE PAD PAD   5/3/2024  9:15 AM Julia Adrian MD [facility] ID PAD PAD   5/22/2024  7:00 AM PAD US [facility]AS CART 3 BH PAD [facility] PAD   5/22/2024  9:45 AM Jessica Rodgers APRN [facility] VS PAD PAD   8/5/2024  9:00 AM Emeka Garay MD [facility] CD PAD PAD          COURSE & MEDICAL DECISION MAKING       Patient's partial differential diagnosis can include:    partial bowel obstruction, bowel obstruction,constipation, irritable bowel, diverticulitis, diverticulosis, Crohn's disease, ulcerative colitis, celiac disease   Gastritis, gastroenteritis, colitis, enteritis, volvulus, intussusception, esophageal spasms, gastroparesis, GERD, peptic ulcer disease, perforated esophagus, perforated peptic ulcer, AAA, mesenteric adenitis, mesenteric ischemia, and others    Concern is the patient's continued bowel issues with not having a bowel movement.  Not sure if enema would be safe to perform after having surgical clips placed on part of the colon from his previous visit when he had a GI bleed.  Would recommend calling gastroenterology or general surgery to determine if this is a option for removing stool.  CT scan of the abdomen and pelvis is being performed and the final disposition will also play a role on the plan to help relieve the patient's abdominal pain and stool burden.      Final disposition for this patient will be turned over to Dr. Pascual    Patient's level of risk: Moderate    CRITICAL CARE    CRITICAL CARE: no    CRITICAL  CARE TIME: none      No results found for this or any previous visit (from the past 24 hour(s)).       Old charts were reviewed per [facility] EMR.  Pertinent details are summarized above.  All laboratory, radiologic, and EKG studies that were performed in the Emergency Department were a necessary part of the evaluation needed to exclude unstable or  emergent medical conditions.     Patient was hemodynamically and neurologically stable in the ED.   Pertinent studies were reviewed as above.     The patient received:  Medications   Morphine sulfate (PF) injection 2 mg (2 mg Intravenous Given 4/20/24 0516)   ondansetron (ZOFRAN) injection 4 mg (4 mg Intravenous Given 4/20/24 0516)   famotidine (PEPCID) injection 20 mg (20 mg Intravenous Given 4/20/24 0516)   iopamidol (ISOVUE-300) 61 % injection 100 mL (100 mL Intravenous Given 4/20/24 0610)       ED Course as of 04/22/24 1009   Sat Apr 20, 2024   0728 Mr. Luong came to the ED with abdominal pain was seen by Dr. Molina please refer to his records for details CT of the abdomen pelvis performed on him which shows no evidence of colitis or bowel obstruction mild fecal burden throughout the ascending and transverse colon moderate bilateral pleural effusions.  Rest of the chronic changes unchanged compared to prior stat CAT scans. [TS]   0748  Small to moderate bilateral pleural effusions have increased in size.  There is associated atelectasis.  2. Edema in the subcutaneous soft tissues may represent volume overload.  3. No acute abnormality in the abdomen or pelvis.      [TS]   0810 Patient has got chronic anemia chronic CKD. [TS]   0810 1. Small to moderate bilateral pleural effusions have increased in size.  There is associated atelectasis.  2. Edema in the subcutaneous soft tissues may represent volume overload.  3. No acute abnormality in the abdomen or pelvis.     These findings have been discussed the patient he does not appear to be in florid pulmonary edema at this  time is got history of CHF with pleural effusions he has been advised to follow the primary MD for reevaluation and continue with home medications.  Currently he is not hypoxic and not tachypneic and no respiratory distress. [TS]      ED Course User Index  [TS] Faisal Pascual MD       Diagnoses that have been ruled out:   None   Diagnoses that are still under consideration:   None   Final diagnoses:   Generalized abdominal pain   Other constipation   Hyperglycemia due to diabetes mellitus        FINAL IMPRESSION   Diagnosis Plan   1. Generalized abdominal pain        2. Other constipation        3. Hyperglycemia due to diabetes mellitus              Karel Molina Jr, MD        ED Disposition       ED Disposition   Discharge    Condition   Stable    Comment   --                 Dragon disclaimer:  Part of this note may be an electronic transcription/translation of spoken language to printed text using the Dragon Dictation System.     I have reviewed the patient’s prescription history via a prescription monitoring program.  This information is consistent with my knowledge of the patient’s controlled substance use history.        Karel Molina Jr., MD  04/22/24 6010

## 2024-04-22 ENCOUNTER — TELEPHONE (OUTPATIENT)
Dept: GASTROENTEROLOGY | Facility: CLINIC | Age: 69
End: 2024-04-22
Payer: COMMERCIAL

## 2024-04-22 NOTE — TELEPHONE ENCOUNTER
Patient spouse called and left a message about the patients constipation. They have went to the ER twice since last week. He has had a CT scan as well. I attempted to contact her back to discuss. I was unable to leave her a message due to her mailbox being full. Will await callback.

## 2024-04-23 ENCOUNTER — TELEPHONE (OUTPATIENT)
Dept: CARDIOLOGY | Facility: CLINIC | Age: 69
End: 2024-04-23

## 2024-04-23 NOTE — TELEPHONE ENCOUNTER
Caller: Joan Luong     Relationship: WIFE    Best call back number: [phone]     What is your medical concern? PT HAS BEEN IN ER TWICE FOR A GI BLEED. THEY STOPPED HIS ELIQUIS WHILE HE WAS THERE, AND GASTRO RECOMMENDED THAT PT CALL ABOUT ELIQUIS. PT WAS [facility]. PT IS NOT CURRENTLY ON ANY BLOOD THINNERS    How long has this issue been going on? 9 DAYS    Is your provider already aware of this issue? NO    Have you been treated for this issue? NO

## 2024-04-24 ENCOUNTER — HOSPITAL ENCOUNTER (EMERGENCY)
Facility: HOSPITAL | Age: 69
Discharge: HOME | End: 2024-04-24
Admitting: EMERGENCY MEDICINE
Payer: COMMERCIAL

## 2024-04-24 ENCOUNTER — APPOINTMENT (OUTPATIENT)
Dept: GENERAL RADIOLOGY | Facility: HOSPITAL | Age: 69
End: 2024-04-24
Payer: COMMERCIAL

## 2024-04-24 VITALS
HEIGHT: 72 IN | OXYGEN SATURATION: 98 % | RESPIRATION RATE: 18 BRPM | DIASTOLIC BLOOD PRESSURE: 79 MMHG | TEMPERATURE: 98.7 F | WEIGHT: 295.42 LBS | SYSTOLIC BLOOD PRESSURE: 147 MMHG | HEART RATE: 103 BPM | BODY MASS INDEX: 40.01 KG/M2

## 2024-04-24 DIAGNOSIS — Z87.19 HISTORY OF CONSTIPATION: ICD-10-CM

## 2024-04-24 DIAGNOSIS — Z86.79 HISTORY OF CHF (CONGESTIVE HEART FAILURE): ICD-10-CM

## 2024-04-24 DIAGNOSIS — D64.9 CHRONIC ANEMIA: Primary | ICD-10-CM

## 2024-04-24 LAB
ALBUMIN SERPL-MCNC: 3.6 G/DL (ref 3.5–5.2)
ALBUMIN/GLOB SERPL: 1.4 G/DL
ALP SERPL-CCNC: 113 U/L (ref 39–117)
ALT SERPL W P-5'-P-CCNC: 19 U/L (ref 1–41)
ANION GAP SERPL CALCULATED.3IONS-SCNC: 9 MMOL/L (ref 5–15)
AST SERPL-CCNC: 16 U/L (ref 1–40)
BASOPHILS # BLD AUTO: 0.02 10*3/MM3 (ref 0–0.2)
BASOPHILS NFR BLD AUTO: 0.3 % (ref 0–1.5)
BILIRUB SERPL-MCNC: 0.5 MG/DL (ref 0–1.2)
BUN SERPL-MCNC: 17 MG/DL (ref 8–23)
BUN/CREAT SERPL: 9.8 (ref 7–25)
CALCIUM SPEC-SCNC: 8.8 MG/DL (ref 8.6–10.5)
CHLORIDE SERPL-SCNC: 103 MMOL/L (ref 98–107)
CO2 SERPL-SCNC: 26 MMOL/L (ref 22–29)
CREAT SERPL-MCNC: 1.74 MG/DL (ref 0.76–1.27)
DEPRECATED RDW RBC AUTO: 45.1 FL (ref 37–54)
EGFRCR SERPLBLD CKD-EPI 2021: 42.4 ML/MIN/1.73
EOSINOPHIL # BLD AUTO: 0.39 10*3/MM3 (ref 0–0.4)
EOSINOPHIL NFR BLD AUTO: 5.8 % (ref 0.3–6.2)
ERYTHROCYTE [DISTWIDTH] IN BLOOD BY AUTOMATED COUNT: 14.4 % (ref 12.3–15.4)
GLOBULIN UR ELPH-MCNC: 2.6 GM/DL
GLUCOSE SERPL-MCNC: 258 MG/DL (ref 65–99)
HCT VFR BLD AUTO: 25.5 % (ref 37.5–51)
HGB BLD-MCNC: 8.2 G/DL (ref 13–17.7)
IMM GRANULOCYTES # BLD AUTO: 0.04 10*3/MM3 (ref 0–0.05)
IMM GRANULOCYTES NFR BLD AUTO: 0.6 % (ref 0–0.5)
LIPASE SERPL-CCNC: 7 U/L (ref 13–60)
LYMPHOCYTES # BLD AUTO: 1.41 10*3/MM3 (ref 0.7–3.1)
LYMPHOCYTES NFR BLD AUTO: 20.8 % (ref 19.6–45.3)
MCH RBC QN AUTO: 27.9 PG (ref 26.6–33)
MCHC RBC AUTO-ENTMCNC: 32.2 G/DL (ref 31.5–35.7)
MCV RBC AUTO: 86.7 FL (ref 79–97)
MONOCYTES # BLD AUTO: 0.66 10*3/MM3 (ref 0.1–0.9)
MONOCYTES NFR BLD AUTO: 9.7 % (ref 5–12)
NEUTROPHILS NFR BLD AUTO: 4.25 10*3/MM3 (ref 1.7–7)
NEUTROPHILS NFR BLD AUTO: 62.8 % (ref 42.7–76)
NRBC BLD AUTO-RTO: 0 /100 WBC (ref 0–0.2)
NT-PROBNP SERPL-MCNC: 4580 PG/ML (ref 0–900)
PLATELET # BLD AUTO: 154 10*3/MM3 (ref 140–450)
PMV BLD AUTO: 11.9 FL (ref 6–12)
POTASSIUM SERPL-SCNC: 4.2 MMOL/L (ref 3.5–5.2)
PROT SERPL-MCNC: 6.2 G/DL (ref 6–8.5)
RBC # BLD AUTO: 2.94 10*6/MM3 (ref 4.14–5.8)
SODIUM SERPL-SCNC: 138 MMOL/L (ref 136–145)
WBC NRBC COR # BLD AUTO: 6.77 10*3/MM3 (ref 3.4–10.8)

## 2024-04-24 PROCEDURE — 99283 EMERGENCY DEPT VISIT LOW MDM: CPT

## 2024-04-24 PROCEDURE — 80053 COMPREHEN METABOLIC PANEL: CPT | Performed by: NURSE PRACTITIONER

## 2024-04-24 PROCEDURE — 83880 ASSAY OF NATRIURETIC PEPTIDE: CPT | Performed by: NURSE PRACTITIONER

## 2024-04-24 PROCEDURE — 83690 ASSAY OF LIPASE: CPT | Performed by: NURSE PRACTITIONER

## 2024-04-24 PROCEDURE — 74018 RADEX ABDOMEN 1 VIEW: CPT

## 2024-04-24 PROCEDURE — 25010000002 BUMETANIDE PER 0.5 MG: Performed by: NURSE PRACTITIONER

## 2024-04-24 PROCEDURE — 96374 THER/PROPH/DIAG INJ IV PUSH: CPT

## 2024-04-24 PROCEDURE — 71045 X-RAY EXAM CHEST 1 VIEW: CPT

## 2024-04-24 PROCEDURE — 85025 COMPLETE CBC W/AUTO DIFF WBC: CPT | Performed by: NURSE PRACTITIONER

## 2024-04-24 RX ORDER — BUMETANIDE 0.25 MG/ML
1 INJECTION INTRAMUSCULAR; INTRAVENOUS ONCE
Status: COMPLETED | OUTPATIENT
Start: 2024-04-24 | End: 2024-04-24

## 2024-04-24 RX ORDER — SODIUM CHLORIDE 0.9 % (FLUSH) 0.9 %
10 SYRINGE (ML) INJECTION AS NEEDED
Status: DISCONTINUED | OUTPATIENT
Start: 2024-04-24 | End: 2024-04-25 | Stop reason: HOSPADM

## 2024-04-24 RX ADMIN — BUMETANIDE 1 MG: 0.25 INJECTION INTRAMUSCULAR; INTRAVENOUS at 22:25

## 2024-04-25 NOTE — ED PROVIDER NOTES
Subjective   History of Present Illness  Patient is a 68-year-old male who presents to the ER due to constipation.  Patient has not had a good bowel movement since last Tuesday.  He had a recent admission to this hospital and was discharged on April 11, 2024.  He was evaluated in this ER for constipation and was prescribed GoLytely however wife states she did not  this medication and has not administered since she was waiting for GI to approve for patient to take this medicine.  Patient continues to have constipation.  He complains of diffuse abdominal pain as well as nausea.  He has shortness of breath due to abdominal discomfort and distention.    Per review patient was admitted March 26, 2024 to April 11, 2024.  He has history of coronary artery disease, diastolic dysfunction, vascular disease, GERD, diabetes, diabetic gastropathy on Reglan, chronic anemia, nonhealing left foot wound and is followed by wound care, chronic kidney disease, GERD, BPH, diabetic polyneuropathy, hypertension.  Note mentions patient had an extensive admission August 2023 due to syncope and subsequently was admitted to the [facility].  He underwent debridement of protease foot wound infection.  He is followed by podiatry in [place].  He presented with worsening redness to the foot.  He presented altered and disoriented with a glucose greater than 500.  Lactic acid and procalcitonin were elevated.  White blood count was elevated without bandemia.  He was febrile with a temp of 100.7.  Left foot had erythema with multiple ulcers which appeared infected.  He was admitted and treated for sepsis.  He was treated with IV antibiotics and grew MRSA.  Infectious disease was consulted and patient was transitioned to p.o. Zyvox.  He was evaluated by nephrology regarding renal function as well as GI.  Patient began having some rectal bleeding during admission.  He had a colonoscopy with clipping of some bleeding lesions in the colon.  Aspirin and  Eliquis were discontinued due to bowel prep for his colonoscopy.  He was instructed to follow-up with GI as an outpatient.  Please see note for complete details.    Wife states they have followed up with GI since discharge.  Again patient continues to have abdominal pain he feels is secondary to constipation.  Past medical history significant for arthritis, autonomic disease, coronary artery disease, cervical radiculopathy, chronic constipation, coronary artery disease, diabetes, diabetic foot ulcer, diabetic polyneuropathy, hyperlipidemia, GERD, hypertension, osteomyelitis, pancreatitis, renal disorder, sleep apnea, vitamin D deficiency, CHF        Review of Systems   Constitutional: Negative.  Negative for chills and fever.   HENT: Negative.  Negative for congestion.    Eyes: Negative.    Respiratory:  Positive for shortness of breath. Negative for cough.    Cardiovascular: Negative.  Negative for chest pain.   Gastrointestinal:  Positive for abdominal pain, constipation and nausea. Negative for vomiting.   Genitourinary: Negative.  Negative for dysuria.   Musculoskeletal: Negative.  Negative for back pain.   Skin: Negative.    All other systems reviewed and are negative.      Past Medical History:   Diagnosis Date    Arthritis     Autonomic disease     CAD (coronary artery disease) 02/06/2017    Cervical radiculopathy 09/16/2021    Chronic constipation with acute exaccerbation 05/10/2021    Coronary artery disease     Degeneration of cervical intervertebral disc 08/11/2021    Diabetes mellitus     Diabetic foot ulcer 08/31/2020    Diabetic polyneuropathy associated with type 2 diabetes mellitus 01/18/2021    Elevated cholesterol     Gastroesophageal reflux disease 05/13/2019    Headache     HTN (hypertension), benign 05/03/2017    Hyperlipidemia     Hypertension     Mixed hyperlipidemia 02/07/2017    Multiple lung nodules 01/26/2020    5mm, 9 mm RLL identified 1/2020, not present 10/2019.    Myocardial infarction  "    Osteomyelitis 01/22/2020    Osteomyelitis of fifth toe of right foot 10/07/2019    Pancreatitis     Persistent insomnia 01/20/2020    Renal disorder     Sleep apnea 02/06/2017    Sleep apnea with use of continuous positive airway pressure (CPAP)     NON-COMPLIANT    Slow transit constipation 01/16/2019    Spinal stenosis in cervical region 09/16/2021    Vitamin D deficiency 03/02/2021       Allergies   Allergen Reactions    Cefepime Hives and Anaphylaxis    Bactrim [Sulfamethoxazole-Trimethoprim] Other (See Comments)     \"RENAL FAILURE\"    Vancomycin Itching    Zolpidem Unknown - High Severity     \"makes him crazy\"    Zolpidem Tartrate Unknown - Low Severity    Metronidazole Rash       Past Surgical History:   Procedure Laterality Date    ABDOMINAL SURGERY      AMPUTATION FOOT / TOE Left 10/2021    5th digit     ANTERIOR CERVICAL DISCECTOMY W/ FUSION N/A 08/05/2022    Procedure: CERVICAL DISCECTOMY ANTERIOR WITH FUSION C5-6 with possible plating of C5-7 with neuromonitoring and 1 c-arm;  Surgeon: Karel Soliz MD;  Location:  [facility] OR;  Service: Neurosurgery;  Laterality: N/A;    APPENDECTOMY      BACK SURGERY      CARDIAC CATHETERIZATION Left 02/08/2021    Procedure: Left Heart Cath w poss intervention left anatomical snuff box acess;  Surgeon: Omkar Charles DO;  Location:  [facility] CATH INVASIVE LOCATION;  Service: Cardiology;  Laterality: Left;    CARDIAC SURGERY      CATARACT EXTRACTION      CERVICAL SPINE SURGERY      COLONOSCOPY N/A 01/31/2017    Normal exam repeat in 5 years    COLONOSCOPY N/A 02/11/2019    Mild acute inflammation    COLONOSCOPY N/A 04/07/2024    2 areas at 10 and 20 cm with friability ulceration 2 clips placed at 20 cm and 4 clips at 10 cm poor prep normal mucosa,mild eroisions and ulcerations in visible vessels    COLONOSCOPY W/ POLYPECTOMY  03/04/2014    Hyperplastic polyp    CORONARY ARTERY BYPASS GRAFT  10/2015    ENDOSCOPY  04/13/2011    Gastritis with hemorrhage    " ENDOSCOPY N/A 2017    Normal exam    ENDOSCOPY N/A 2019    Gastritis    ENDOSCOPY N/A 2020    Non-erosive gastritis with hemorrhage    ENDOSCOPY N/A 02/10/2021    Esophagitis    ENDOSCOPY N/A 2024    There were esophageal mucosal changes suspicious for short-segment Low's esophagus present in the distal esophagus. The maximum longitudinal extent of these mucosal changes was 2 cm in length. Mucosa was biopsied with a cold forceps for histologyDistal esophagus, biopsies: Mild chronic active esophagogastritis. No evidence of intestinal metaplasia, dysplasia. Antrum, bx, Mild chronic gastritis    FOOT SURGERY Left     INCISION AND DRAINAGE OF WOUND Left 2007    spider bite       Family History   Problem Relation Age of Onset    Colon cancer Father     Heart disease Father     Colon cancer Sister     Colon polyps Sister     Alzheimer's disease Mother     Coronary artery disease Sister     Coronary artery disease Sister        Social History     Socioeconomic History    Marital status:    Tobacco Use    Smoking status: Former     Current packs/day: 0.00     Types: Cigarettes     Quit date:      Years since quittin.3    Smokeless tobacco: Never    Tobacco comments:     smoked in highschool   Vaping Use    Vaping status: Never Used   Substance and Sexual Activity    Alcohol use: No    Drug use: No    Sexual activity: Defer           Objective   Physical Exam  Vitals and nursing note reviewed.   Constitutional:       General: He is not in acute distress.     Appearance: He is well-developed. He is not diaphoretic.      Comments: Chronically ill-appearing   HENT:      Head: Normocephalic and atraumatic.      Right Ear: External ear normal.      Left Ear: External ear normal.      Nose: Nose normal.      Mouth/Throat:      Pharynx: Oropharynx is clear.   Eyes:      General: No scleral icterus.     Conjunctiva/sclera: Conjunctivae normal.      Pupils: Pupils are equal, round, and  reactive to light.   Neck:      Thyroid: No thyromegaly.      Vascular: No JVD.   Cardiovascular:      Rate and Rhythm: Normal rate and regular rhythm.      Heart sounds: Normal heart sounds. No murmur heard.  Pulmonary:      Effort: Pulmonary effort is normal. No respiratory distress.      Breath sounds: Normal breath sounds. No wheezing or rales.   Chest:      Chest wall: No tenderness.   Abdominal:      General: Bowel sounds are normal. There is no distension.      Palpations: Abdomen is soft. There is no mass.      Tenderness: There is abdominal tenderness. There is no guarding or rebound.   Musculoskeletal:         General: Swelling present. Normal range of motion.      Cervical back: Normal range of motion and neck supple.   Lymphadenopathy:      Cervical: No cervical adenopathy.   Skin:     General: Skin is warm and dry.      Capillary Refill: Capillary refill takes less than 2 seconds.      Coloration: Skin is pale.      Findings: No erythema or rash.   Neurological:      Mental Status: He is alert and oriented to person, place, and time.      Cranial Nerves: No cranial nerve deficit.      Coordination: Coordination normal.      Deep Tendon Reflexes: Reflexes are normal and symmetric.   Psychiatric:         Mood and Affect: Mood normal.         Behavior: Behavior normal.         Thought Content: Thought content normal.         Judgment: Judgment normal.         Procedures           ED Course                                             Medical Decision Making  Patient is a 68-year-old male who presents to the ER due to constipation.  Patient has not had a good bowel movement since last Tuesday.  He had a recent admission to this hospital and was discharged on April 11, 2024.  He was evaluated in this ER for constipation and was prescribed GoLytely however wife states she did not  this medication and has not administered since she was waiting for GI to approve for patient to take this medicine.  Patient  continues to have constipation.  He complains of diffuse abdominal pain as well as nausea.  He has shortness of breath due to abdominal discomfort and distention.    Per review patient was admitted March 26, 2024 to April 11, 2024.  He has history of coronary artery disease, diastolic dysfunction, vascular disease, GERD, diabetes, diabetic gastropathy on Reglan, chronic anemia, nonhealing left foot wound and is followed by wound care, chronic kidney disease, GERD, BPH, diabetic polyneuropathy, hypertension.  Note mentions patient had an extensive admission August 2023 due to syncope and subsequently was admitted to the [facility].  He underwent debridement of protease foot wound infection.  He is followed by podiatry in [place].  He presented with worsening redness to the foot.  He presented altered and disoriented with a glucose greater than 500.  Lactic acid and procalcitonin were elevated.  White blood count was elevated without bandemia.  He was febrile with a temp of 100.7.  Left foot had erythema with multiple ulcers which appeared infected.  He was admitted and treated for sepsis.  He was treated with IV antibiotics and grew MRSA.  Infectious disease was consulted and patient was transitioned to p.o. Zyvox.  He was evaluated by nephrology regarding renal function as well as GI.  Patient began having some rectal bleeding during admission.  He had a colonoscopy with clipping of some bleeding lesions in the colon.  Aspirin and Eliquis were discontinued due to bowel prep for his colonoscopy.  He was instructed to follow-up with GI as an outpatient.  Please see note for complete details.    Wife states they have followed up with GI since discharge.  Again patient continues to have abdominal pain he feels is secondary to constipation.  Past medical history significant for arthritis, autonomic disease, coronary artery disease, cervical radiculopathy, chronic constipation, coronary artery disease, diabetes, diabetic foot  ulcer, diabetic polyneuropathy, hyperlipidemia, GERD, hypertension, osteomyelitis, pancreatitis, renal disorder, sleep apnea, vitamin D deficiency, CHF  Differential diagnosis: Constipation, chronic anemia, CHF, and other    Labs Reviewed  COMPREHENSIVE METABOLIC PANEL - Abnormal; Notable for the following components:     Glucose                       258 (*)                Creatinine                    1.74 (*)               eGFR                          42.4 (*)            All other components within normal limits         Narrative: GFR Normal >60                  Chronic Kidney Disease <60                  Kidney Failure <15                    LIPASE - Abnormal; Notable for the following components:     Lipase                        7 (*)               All other components within normal limits  CBC WITH AUTO DIFFERENTIAL - Abnormal; Notable for the following components:     RBC                           2.94 (*)               Hemoglobin                    8.2 (*)                Hematocrit                    25.5 (*)               Immature Grans %              0.6 (*)             All other components within normal limits  BNP (IN-HOUSE) - Abnormal; Notable for the following components:     proBNP                        4,580.0 (*)            All other components within normal limits         Narrative: This assay is used as an aid in the diagnosis of individuals suspected of having heart failure. It can be used as an aid in the diagnosis of acute decompensated heart failure (ADHF) in patients presenting with signs and symptoms of ADHF to the emergency department (ED). In addition, NT-proBNP of <300 pg/mL indicates ADHF is not likely.                                    Age Range Result Interpretation  NT-proBNP Concentration (pg/mL:                                                      <50             Positive            >450                                   [place]                 300-450                                     Negative             <300                                    50-75           Positive            >900                                  North                300-900                                  Negative            <300                                                      >75             Positive            >1800                                  North                300-1800                                  Negative            <300  CBC AND DIFFERENTIAL    XR Chest 1 View   Final Result    1. Similar appearance of mild basilar atelectasis and small amounts of    pleural fluid.              This report was signed and finalized on 4/24/2024 9:16 PM by Dr. Gomez Mcgill MD.          XR Abdomen KUB   Final Result    1. No acute abnormality.                   This report was signed and finalized on 4/24/2024 7:36 PM by Dr. Gomez Mcgill MD.         Patient has stable anemia. No significant constipation noted. Bnp is elevated. Dr. Arriaza reviewed labs and xrays. Recommends a dose of bumex with close f/u with pcp for re-evaluation. We recommend daily dulcolax and may attempt otc fleets enema if continues to have sxs of constipation. He will need to return with any worsening symptoms. Wife and patient are both agreeable with this plan.    Problems Addressed:  Chronic anemia: chronic illness or injury  History of CHF (congestive heart failure): chronic illness or injury  History of constipation: chronic illness or injury    Amount and/or Complexity of Data Reviewed  Labs: ordered. Decision-making details documented in ED Course.  Radiology: ordered. Decision-making details documented in ED Course.    Risk  Prescription drug management.        Final diagnoses:   Chronic anemia   History of constipation   History of CHF (congestive heart failure)       ED Disposition  ED Disposition       ED Disposition   Discharge    Condition   Stable    Comment   --               No follow-up provider specified.       Medication  List        Changed      * fluticasone 50 MCG/ACT nasal spray  Commonly known as: FLONASE  1 spray into the nostril(s) as directed by provider Daily.  What changed: when to take this     * fluticasone 50 MCG/ACT nasal spray  Commonly known as: FLONASE  Instill 1 spray in each nostril as directed by provider Daily.  What changed: Another medication with the same name was changed. Make sure you understand how and when to take each.     * fluticasone 50 MCG/ACT nasal spray  Commonly known as: FLONASE  Instill 1 spray into the nostril(s) as directed by provider Daily.  What changed: Another medication with the same name was changed. Make sure you understand how and when to take each.           * This list has 3 medication(s) that are the same as other medications prescribed for you. Read the directions carefully, and ask your doctor or other care provider to review them with you.                     Neelam Polo, APRN  04/24/24 [id number]

## 2024-04-25 NOTE — DISCHARGE INSTRUCTIONS
As discussed I would do a daily dulcolax and you may attempt a mild over the counter enema in the next few days if you have no relief however per xray and latest ct scan there is no profound constipation to warrant GoLytely yet. If you do decide to take the medication I would only do 1/2 and see if you have any results.   You will still need a close f/u with your pcp and return with any worsening symptoms.     I hope you feel better soon!!

## 2024-04-29 NOTE — PLAN OF CARE
Health Maintenance       Hepatitis B Vaccine (1 of 3 - 19+ 3-dose series)  Never done    Depression Screening (Yearly)  Overdue since 3/22/2024           Following review of the above:  Patient wishes to discuss with clinician: Hep B    Note: Refer to final orders and clinician documentation.       Goal Outcome Evaluation:  Plan of Care Reviewed With: patient        Progress: no change  Outcome Evaluation: pt bed exit alarming, pt trans to EOB sba, dong L CAM boot max assist, sit-stand cga, pt amb 30 feet cga rwx, trans to chair cga, cues for safety, BLE AROM 10 x 2 reps,

## 2024-05-01 ENCOUNTER — READMISSION MANAGEMENT (OUTPATIENT)
Dept: CALL CENTER | Facility: HOSPITAL | Age: 69
End: 2024-05-01
Payer: COMMERCIAL

## 2024-05-01 NOTE — OUTREACH NOTE
Sepsis Week 3 Survey      Flowsheet Row Responses   [facility] patient discharged from? [facility]   Does the patient have one of the following disease processes/diagnoses(primary or secondary)? Sepsis   Week 3 attempt successful? No   Unsuccessful attempts Attempt 1            Anthony MITCHELL - Registered Nurse

## 2024-05-02 ENCOUNTER — OFFICE VISIT (OUTPATIENT)
Dept: GASTROENTEROLOGY | Facility: CLINIC | Age: 69
End: 2024-05-02
Payer: COMMERCIAL

## 2024-05-02 VITALS
BODY MASS INDEX: 39.68 KG/M2 | DIASTOLIC BLOOD PRESSURE: 76 MMHG | SYSTOLIC BLOOD PRESSURE: 120 MMHG | HEIGHT: 72 IN | TEMPERATURE: 96.8 F | WEIGHT: 293 LBS | OXYGEN SATURATION: 96 % | HEART RATE: 75 BPM

## 2024-05-02 DIAGNOSIS — K59.01 SLOW TRANSIT CONSTIPATION: Primary | ICD-10-CM

## 2024-05-02 DIAGNOSIS — R10.10 PAIN OF UPPER ABDOMEN: ICD-10-CM

## 2024-05-02 DIAGNOSIS — Z78.9 NONSMOKER: ICD-10-CM

## 2024-05-02 PROCEDURE — 99214 OFFICE O/P EST MOD 30 MIN: CPT | Performed by: CLINICAL NURSE SPECIALIST

## 2024-05-02 NOTE — PROGRESS NOTES
"[facility] [place]    Patient:  Erick Luong 68 y.o. male  YOB: 1955  MRN: [id number]  Primary Care Physician: Del Shetty MD  REFERRING PROVIDER: Rene Hernandez MD    Chief Complaint MRSA bacteremia-blood cultures were positive March 23 and on repeat March 29..  Source was felt to be infected left foot with wounds and associated cellulitis on admission       History of Present Illness  Erick Luong presents to [facility] for follow-up of MRSA bacteremia during recent hospitalization secondary to infected left foot wounds and cellulitis.    Patient was treated in the hospital for the bacteremia and completed a 2-week course with oral linezolid.  He has not had any recurrent fevers or chills but is \"hot and cold\"    His wife Joan notes they are \"stressed to the max\".  They have had doctors appointments every day just about.  He is seeing Dr. Gomes, his podiatrist regularly as well.  It does not sound like there is any tunneling or probing to bone regarding the wounds.    Patient was back in the emergency department on April 25 as he had not had a bowel movement in 2 weeks.  He has fortunately been working with GI regarding that and knows that he is post at least have a bowel movement every other day.    Patient last saw Yuliana Vinson on 5/2/2024  Next appointment is to be scheduled after his colonoscopy which was postponed due to his condition    Patient last saw Claudia Garay NP with  on 4/26/24  Next appointment is in 6 week    Patient last saw Dr. Gomes on 4/29/24  Next appointment is Monday 5/6/24 (sees her every     He has not followed-up with SANDY Avelar   Next appointment is not scheduled    SUSAN William with Dr. Lomas a couple weeks ago - upped Bumex to BID-the Bumex was increased due to the patient having a lot of swelling.  At that time the abdomen was swollen but in hindsight was likely due to significant " constipation.  Joan noted that he did have some peripheral edema as well.    Last labs done on 4/25/2024 @ [facility]    Current Outpatient Medications on File Prior to Visit   Medication Sig    ascorbic acid (VITAMIN C) 1000 MG tablet Take 1 tablet by mouth Daily.    bumetanide (BUMEX) 2 MG tablet Take 1 tablet by mouth Daily. (Patient taking differently: Take 1 tablet by mouth 2 (Two) Times a Day.)    calcitriol (ROCALTROL) 0.5 MCG capsule Take 1 capsule by mouth Daily.    carvedilol (COREG) 3.125 MG tablet Take 1 tablet twice a day by oral route.    Cholecalciferol (D-5000) 125 MCG (5000 UT) tablet Take 1 tablet by mouth Daily Before Lunch.    Diclofenac Sodium (VOLTAREN) 1 % gel gel Apply 2 g topically to the appropriate area as directed 4 (Four) Times a Day As Needed.    DULoxetine (CYMBALTA) 60 MG capsule Take 1 capsule by mouth Daily.    famotidine (PEPCID) 20 MG tablet Take 1 tablet twice a day by oral route.    fluticasone (FLONASE) 50 MCG/ACT nasal spray Instill 1 spray in each nostril as directed by provider Daily.    Insulin Glargine (Lantus SoloStar) 100 UNIT/ML injection pen Inject 20 Units under the skin into the appropriate area as directed Every Night.    Insulin Regular Human, Conc, (HumuLIN R U-500 KwikPen) 500 UNIT/ML solution pen-injector CONCENTRATED injection Inject 15 Units under the skin into the appropriate area as directed 3 (Three) Times a Day Before Meals AND 40 Units Daily.    Iron-Vitamin C (Vitron-C)  MG tablet Take 1 tablet twice a day by oral route.    melatonin 3 MG tablet Take 2 tablets by mouth At Night As Needed for Sleep.    methylcellulose (Citrucel) oral powder Mix 5g as directed and drink twice daily for 90 days.    midodrine (PROAMATINE) 2.5 MG tablet Take 1 tablet by mouth 3 (Three) Times a Day Before Meals.    ondansetron ODT (ZOFRAN-ODT) 4 MG disintegrating tablet Place 1 tablet on the tongue Every 8 (Eight) Hours As Needed for Nausea or Vomiting.    oxyCODONE  "(ROXICODONE) 10 MG tablet Take 1 tablet by mouth 2 (Two) Times a Day As Needed.    pantoprazole (Protonix) 40 MG EC tablet Take 1 tablet by mouth 2 (Two) Times a Day.    polyethylene glycol (MIRALAX) 17 g packet Take 17 g by mouth Daily. Obtain OTC    pregabalin (LYRICA) 100 MG capsule Take 2 capsules by mouth Every Night.    pregabalin (LYRICA) 50 MG capsule Take 1 capsule by mouth Daily.    rosuvastatin (CRESTOR) 10 MG tablet Take 1 tablet by mouth Daily.    sennosides-docusate (PERICOLACE) 8.6-50 MG per tablet Take 1 tablet by mouth Every Night. Obtain OTC    sodium hypochlorite (DAKIN'S 1/4 STRENGTH) 0.125 % solution topical solution 0.125% Apply solution three times daily    sucralfate (CARAFATE) 1 g tablet Take 1 tablet by mouth 3 times a day.    tamsulosin (FLOMAX) 0.4 MG capsule 24 hr capsule Take 1 capsule by mouth Daily.    timolol (TIMOPTIC) 0.5 % ophthalmic solution Administer 1 drop to both eyes 2 (Two) Times a Day.    valsartan (DIOVAN) 40 MG tablet Take 1 tablet by mouth Daily.    zinc sulfate (ZINCATE) 50 MG capsule Take 1 capsule by mouth Daily.    Insulin Glargine (Lantus SoloStar) 100 UNIT/ML injection pen Inject 20 Units under the skin into the appropriate area as directed Every Evening. (Patient not taking: Reported on 5/3/2024)    isosorbide mononitrate (IMDUR) 30 MG 24 hr tablet Take 1 tablet by mouth Daily.    metoclopramide (REGLAN) 5 MG tablet Take 1 tablet by mouth 3 times a day. (Patient not taking: Reported on 5/3/2024)    [DISCONTINUED] donepezil (ARICEPT) 10 MG tablet Take 1 tablet by mouth Daily. (Patient not taking: Reported on 4/16/2024)    [DISCONTINUED] spironolactone (ALDACTONE) 25 MG tablet Take 1 tablet by mouth Daily.     No current facility-administered medications on file prior to visit.     Allergies   Allergen Reactions    Cefepime Hives and Anaphylaxis    Bactrim [Sulfamethoxazole-Trimethoprim] Other (See Comments)     \"RENAL FAILURE\"    Vancomycin Itching    Zolpidem " "Mental Status Change     \"makes him crazy\"    Metronidazole Rash     Social History     Socioeconomic History    Marital status:    Tobacco Use    Smoking status: Former     Current packs/day: 0.00     Types: Cigarettes     Quit date:      Years since quittin.3    Smokeless tobacco: Never    Tobacco comments:     smoked in highschool   Vaping Use    Vaping status: Never Used   Substance and Sexual Activity    Alcohol use: No    Drug use: No    Sexual activity: Defer       Venous Access Review  Line/IV site: No current IV Access    Antimicrobial Review  Currently on antibiotics/antifungals: no  Start Date of Therapy: Patient took linezolid 3 days after discharge  If therapy completed, date complete:     Objective   Vital Signs:  BP 99/63 (BP Location: Left arm, Patient Position: Sitting, Cuff Size: Adult)   Pulse 86   Temp 97.8 °F (36.6 °C) (Temporal)   Ht 182.9 cm (72\")   Wt 125 kg (275 lb 12.8 oz)   SpO2 98%   BMI 37.41 kg/m²   Estimated body mass index is 37.41 kg/m² as calculated from the following:    Height as of this encounter: 182.9 cm (72\").    Weight as of this encounter: 125 kg (275 lb 12.8 oz).  Recheck of /72 right arm            Physical Exam  General: Patient is fatigued appearing sitting in the exam room in no acute respiratory distress.  Heart: S1-S2, rate was regular  Abdomen: Soft, nontender, no rebound or guarding  Extremities: No pitting edema in the right pretibial region left lower extremity boot in place.  Neuro: Patient was alert.  Speech was clear with generally one-word answers  Psych: Flat affect-this is unchanged from his hospitalization      DATA REVIEWED:  The following data was reviewed by: Julia Adrian MD on 2024:      Comprehensive Metabolic Panel (2024 19:49)   CBC & Differential (2024 19:49)     Reviewed note from [facility] ED Provider Notes by Neelam Polo APRN (2024 19:00)             Assessment and Plan   Diagnoses " and all orders for this visit:    1. MRSA (methicillin resistant Staphylococcus aureus) (Primary)  -     Blood Culture - Blood,; Future  -     Blood Culture - Blood,; Future    2. Type 2 diabetes mellitus with hyperglycemia, with long-term current use of insulin    3. Stage 3b chronic kidney disease    4. Staphylococcus aureus bacteremia    5. Diabetic foot infection          Patient Instructions   Continue off antibiotics   Keep follow up with SUSAN Velásquez  Keep follow up with SUSAN Mitchell  Keep follow up with Dr. Gomes  Keep follow up with SANDY Avelar   Keep follow up with Dr. Lomas     >>>> Discussed with patient and wife to work with primary provider on glucose control after reviewing labs from ER visits or glucose in the 2 and 300s.  Also she will monitor blood pressure at home since his systolic is little bit lower than baseline.  Recommended decreasing the Bumex back to his previous dose of 1 mg every 12 hours as this may be part of the reason his blood pressure is on the low side.  >>>> Copy note to Dr. Gomes-done       Follow Up   Return if symptoms worsen or fail to improve.  Patient was given instructions and counseling regarding his condition or for health maintenance advice. Please see specific information pulled into the AVS if appropriate.

## 2024-05-03 ENCOUNTER — LAB (OUTPATIENT)
Dept: LAB | Facility: HOSPITAL | Age: 69
End: 2024-05-03
Payer: COMMERCIAL

## 2024-05-03 ENCOUNTER — APPOINTMENT (OUTPATIENT)
Dept: GENERAL RADIOLOGY | Facility: HOSPITAL | Age: 69
End: 2024-05-03
Payer: COMMERCIAL

## 2024-05-03 ENCOUNTER — OFFICE VISIT (OUTPATIENT)
Age: 69
End: 2024-05-03
Payer: MEDICARE

## 2024-05-03 ENCOUNTER — HOSPITAL ENCOUNTER (EMERGENCY)
Facility: HOSPITAL | Age: 69
Discharge: HOME | End: 2024-05-04
Payer: COMMERCIAL

## 2024-05-03 ENCOUNTER — APPOINTMENT (OUTPATIENT)
Dept: CT IMAGING | Facility: HOSPITAL | Age: 69
End: 2024-05-03
Payer: COMMERCIAL

## 2024-05-03 VITALS
HEIGHT: 72 IN | TEMPERATURE: 97.8 F | SYSTOLIC BLOOD PRESSURE: 99 MMHG | BODY MASS INDEX: 37.36 KG/M2 | HEART RATE: 86 BPM | DIASTOLIC BLOOD PRESSURE: 63 MMHG | WEIGHT: 275.8 LBS | OXYGEN SATURATION: 98 %

## 2024-05-03 DIAGNOSIS — N18.32 STAGE 3B CHRONIC KIDNEY DISEASE: ICD-10-CM

## 2024-05-03 DIAGNOSIS — Z79.4 TYPE 2 DIABETES MELLITUS WITH HYPERGLYCEMIA, WITH LONG-TERM CURRENT USE OF INSULIN: ICD-10-CM

## 2024-05-03 DIAGNOSIS — D64.9 ANEMIA, UNSPECIFIED TYPE: ICD-10-CM

## 2024-05-03 DIAGNOSIS — L08.9 DIABETIC FOOT INFECTION: ICD-10-CM

## 2024-05-03 DIAGNOSIS — E11.65 TYPE 2 DIABETES MELLITUS WITH HYPERGLYCEMIA, WITH LONG-TERM CURRENT USE OF INSULIN: ICD-10-CM

## 2024-05-03 DIAGNOSIS — R81 GLUCOSURIA: ICD-10-CM

## 2024-05-03 DIAGNOSIS — B95.61 STAPHYLOCOCCUS AUREUS BACTEREMIA: ICD-10-CM

## 2024-05-03 DIAGNOSIS — R41.82 ALTERED MENTAL STATUS, UNSPECIFIED ALTERED MENTAL STATUS TYPE: Primary | ICD-10-CM

## 2024-05-03 DIAGNOSIS — R78.81 STAPHYLOCOCCUS AUREUS BACTEREMIA: ICD-10-CM

## 2024-05-03 DIAGNOSIS — R73.09 HEMOGLOBIN A1C GREATER THAN 9.0%: ICD-10-CM

## 2024-05-03 DIAGNOSIS — E11.628 DIABETIC FOOT INFECTION: ICD-10-CM

## 2024-05-03 DIAGNOSIS — A49.02 MRSA (METHICILLIN RESISTANT STAPHYLOCOCCUS AUREUS): Primary | ICD-10-CM

## 2024-05-03 DIAGNOSIS — A49.02 MRSA (METHICILLIN RESISTANT STAPHYLOCOCCUS AUREUS): ICD-10-CM

## 2024-05-03 LAB
ALBUMIN SERPL-MCNC: 3.8 G/DL (ref 3.5–5.2)
ALBUMIN/GLOB SERPL: 1.5 G/DL
ALP SERPL-CCNC: 106 U/L (ref 39–117)
ALT SERPL W P-5'-P-CCNC: 17 U/L (ref 1–41)
AMMONIA BLD-SCNC: 21 UMOL/L (ref 16–60)
AMPHET+METHAMPHET UR QL: NEGATIVE
AMPHETAMINES UR QL: NEGATIVE
ANION GAP SERPL CALCULATED.3IONS-SCNC: 11 MMOL/L (ref 5–15)
AST SERPL-CCNC: 21 U/L (ref 1–40)
B PARAPERT DNA SPEC QL NAA+PROBE: NOT DETECTED
B PERT DNA SPEC QL NAA+PROBE: NOT DETECTED
BACTERIA UR QL AUTO: ABNORMAL /HPF
BARBITURATES UR QL SCN: NEGATIVE
BASOPHILS # BLD AUTO: 0.04 10*3/MM3 (ref 0–0.2)
BASOPHILS NFR BLD AUTO: 0.6 % (ref 0–1.5)
BENZODIAZ UR QL SCN: NEGATIVE
BILIRUB SERPL-MCNC: 0.5 MG/DL (ref 0–1.2)
BILIRUB UR QL STRIP: NEGATIVE
BUN SERPL-MCNC: 25 MG/DL (ref 8–23)
BUN/CREAT SERPL: 11.3 (ref 7–25)
BUPRENORPHINE SERPL-MCNC: NEGATIVE NG/ML
C PNEUM DNA NPH QL NAA+NON-PROBE: NOT DETECTED
CALCIUM SPEC-SCNC: 9.1 MG/DL (ref 8.6–10.5)
CANNABINOIDS SERPL QL: NEGATIVE
CHLORIDE SERPL-SCNC: 96 MMOL/L (ref 98–107)
CK SERPL-CCNC: 61 U/L (ref 20–200)
CLARITY UR: CLEAR
CO2 SERPL-SCNC: 31 MMOL/L (ref 22–29)
COCAINE UR QL: NEGATIVE
COLOR UR: YELLOW
CREAT SERPL-MCNC: 2.22 MG/DL (ref 0.76–1.27)
D-LACTATE SERPL-SCNC: 1.7 MMOL/L (ref 0.5–2)
DEPRECATED RDW RBC AUTO: 46.2 FL (ref 37–54)
EGFRCR SERPLBLD CKD-EPI 2021: 31.7 ML/MIN/1.73
EOSINOPHIL # BLD AUTO: 0.35 10*3/MM3 (ref 0–0.4)
EOSINOPHIL NFR BLD AUTO: 5.1 % (ref 0.3–6.2)
ERYTHROCYTE [DISTWIDTH] IN BLOOD BY AUTOMATED COUNT: 14.8 % (ref 12.3–15.4)
ETHANOL UR QL: <0.01 %
FENTANYL UR-MCNC: NEGATIVE NG/ML
FLUAV SUBTYP SPEC NAA+PROBE: NOT DETECTED
FLUBV RNA ISLT QL NAA+PROBE: NOT DETECTED
GLOBULIN UR ELPH-MCNC: 2.6 GM/DL
GLUCOSE SERPL-MCNC: 354 MG/DL (ref 65–99)
GLUCOSE UR STRIP-MCNC: ABNORMAL MG/DL
HADV DNA SPEC NAA+PROBE: NOT DETECTED
HBA1C MFR BLD: 11.2 % (ref 4.8–5.6)
HCOV 229E RNA SPEC QL NAA+PROBE: NOT DETECTED
HCOV HKU1 RNA SPEC QL NAA+PROBE: NOT DETECTED
HCOV NL63 RNA SPEC QL NAA+PROBE: NOT DETECTED
HCOV OC43 RNA SPEC QL NAA+PROBE: NOT DETECTED
HCT VFR BLD AUTO: 29.4 % (ref 37.5–51)
HGB BLD-MCNC: 9.3 G/DL (ref 13–17.7)
HGB UR QL STRIP.AUTO: NEGATIVE
HMPV RNA NPH QL NAA+NON-PROBE: NOT DETECTED
HOLD SPECIMEN: NORMAL
HOLD SPECIMEN: NORMAL
HPIV1 RNA ISLT QL NAA+PROBE: NOT DETECTED
HPIV2 RNA SPEC QL NAA+PROBE: NOT DETECTED
HPIV3 RNA NPH QL NAA+PROBE: NOT DETECTED
HPIV4 P GENE NPH QL NAA+PROBE: NOT DETECTED
HYALINE CASTS UR QL AUTO: ABNORMAL /LPF
IMM GRANULOCYTES # BLD AUTO: 0.02 10*3/MM3 (ref 0–0.05)
IMM GRANULOCYTES NFR BLD AUTO: 0.3 % (ref 0–0.5)
INR PPP: 0.98 (ref 0.91–1.09)
KETONES UR QL STRIP: NEGATIVE
LEUKOCYTE ESTERASE UR QL STRIP.AUTO: NEGATIVE
LIPASE SERPL-CCNC: 13 U/L (ref 13–60)
LYMPHOCYTES # BLD AUTO: 1.49 10*3/MM3 (ref 0.7–3.1)
LYMPHOCYTES NFR BLD AUTO: 21.8 % (ref 19.6–45.3)
M PNEUMO IGG SER IA-ACNC: NOT DETECTED
MAGNESIUM SERPL-MCNC: 1.7 MG/DL (ref 1.6–2.4)
MCH RBC QN AUTO: 27.1 PG (ref 26.6–33)
MCHC RBC AUTO-ENTMCNC: 31.6 G/DL (ref 31.5–35.7)
MCV RBC AUTO: 85.7 FL (ref 79–97)
METHADONE UR QL SCN: NEGATIVE
MONOCYTES # BLD AUTO: 0.63 10*3/MM3 (ref 0.1–0.9)
MONOCYTES NFR BLD AUTO: 9.2 % (ref 5–12)
NEUTROPHILS NFR BLD AUTO: 4.32 10*3/MM3 (ref 1.7–7)
NEUTROPHILS NFR BLD AUTO: 63 % (ref 42.7–76)
NITRITE UR QL STRIP: NEGATIVE
NRBC BLD AUTO-RTO: 0 /100 WBC (ref 0–0.2)
OPIATES UR QL: NEGATIVE
OXYCODONE UR QL SCN: POSITIVE
PCP UR QL SCN: NEGATIVE
PH UR STRIP.AUTO: 5.5 [PH] (ref 5–8)
PLATELET # BLD AUTO: 283 10*3/MM3 (ref 140–450)
PMV BLD AUTO: 12 FL (ref 6–12)
POTASSIUM SERPL-SCNC: 3.6 MMOL/L (ref 3.5–5.2)
PROCALCITONIN SERPL-MCNC: 0.08 NG/ML (ref 0–0.25)
PROT SERPL-MCNC: 6.4 G/DL (ref 6–8.5)
PROT UR QL STRIP: ABNORMAL
PROTHROMBIN TIME: 13.4 SECONDS (ref 11.8–14.8)
RBC # BLD AUTO: 3.43 10*6/MM3 (ref 4.14–5.8)
RBC # UR STRIP: ABNORMAL /HPF
REF LAB TEST METHOD: ABNORMAL
RHINOVIRUS RNA SPEC NAA+PROBE: NOT DETECTED
RSV RNA NPH QL NAA+NON-PROBE: NOT DETECTED
SARS-COV-2 RNA NPH QL NAA+NON-PROBE: NOT DETECTED
SODIUM SERPL-SCNC: 138 MMOL/L (ref 136–145)
SP GR UR STRIP: 1.02 (ref 1–1.03)
SQUAMOUS #/AREA URNS HPF: ABNORMAL /HPF
T4 FREE SERPL-MCNC: 1.32 NG/DL (ref 0.93–1.7)
TRICYCLICS UR QL SCN: NEGATIVE
TSH SERPL DL<=0.05 MIU/L-ACNC: 2.89 UIU/ML (ref 0.27–4.2)
UROBILINOGEN UR QL STRIP: ABNORMAL
WBC # UR STRIP: ABNORMAL /HPF
WBC NRBC COR # BLD AUTO: 6.85 10*3/MM3 (ref 3.4–10.8)
WHOLE BLOOD HOLD COAG: NORMAL
WHOLE BLOOD HOLD SPECIMEN: NORMAL

## 2024-05-03 PROCEDURE — 84145 PROCALCITONIN (PCT): CPT | Performed by: PHYSICIAN ASSISTANT

## 2024-05-03 PROCEDURE — 82550 ASSAY OF CK (CPK): CPT | Performed by: PHYSICIAN ASSISTANT

## 2024-05-03 PROCEDURE — 80307 DRUG TEST PRSMV CHEM ANLYZR: CPT | Performed by: PHYSICIAN ASSISTANT

## 2024-05-03 PROCEDURE — 82077 ASSAY SPEC XCP UR&BREATH IA: CPT | Performed by: PHYSICIAN ASSISTANT

## 2024-05-03 PROCEDURE — 25510000001 IOPAMIDOL PER 1 ML: Performed by: PHYSICIAN ASSISTANT

## 2024-05-03 PROCEDURE — 70450 CT HEAD/BRAIN W/O DYE: CPT

## 2024-05-03 PROCEDURE — 93005 ELECTROCARDIOGRAM TRACING: CPT | Performed by: PHYSICIAN ASSISTANT

## 2024-05-03 PROCEDURE — 81001 URINALYSIS AUTO W/SCOPE: CPT | Performed by: PHYSICIAN ASSISTANT

## 2024-05-03 PROCEDURE — 83690 ASSAY OF LIPASE: CPT | Performed by: PHYSICIAN ASSISTANT

## 2024-05-03 PROCEDURE — 87040 BLOOD CULTURE FOR BACTERIA: CPT

## 2024-05-03 PROCEDURE — 83605 ASSAY OF LACTIC ACID: CPT | Performed by: PHYSICIAN ASSISTANT

## 2024-05-03 PROCEDURE — 93010 ELECTROCARDIOGRAM REPORT: CPT | Performed by: INTERNAL MEDICINE

## 2024-05-03 PROCEDURE — 36415 COLL VENOUS BLD VENIPUNCTURE: CPT

## 2024-05-03 PROCEDURE — 80050 GENERAL HEALTH PANEL: CPT | Performed by: PHYSICIAN ASSISTANT

## 2024-05-03 PROCEDURE — 74177 CT ABD & PELVIS W/CONTRAST: CPT

## 2024-05-03 PROCEDURE — 84439 ASSAY OF FREE THYROXINE: CPT | Performed by: PHYSICIAN ASSISTANT

## 2024-05-03 PROCEDURE — 83036 HEMOGLOBIN GLYCOSYLATED A1C: CPT | Performed by: PHYSICIAN ASSISTANT

## 2024-05-03 PROCEDURE — 71275 CT ANGIOGRAPHY CHEST: CPT

## 2024-05-03 PROCEDURE — 70496 CT ANGIOGRAPHY HEAD: CPT

## 2024-05-03 PROCEDURE — 85610 PROTHROMBIN TIME: CPT | Performed by: PHYSICIAN ASSISTANT

## 2024-05-03 PROCEDURE — 71045 X-RAY EXAM CHEST 1 VIEW: CPT

## 2024-05-03 PROCEDURE — 99285 EMERGENCY DEPT VISIT HI MDM: CPT

## 2024-05-03 PROCEDURE — 82140 ASSAY OF AMMONIA: CPT | Performed by: PHYSICIAN ASSISTANT

## 2024-05-03 PROCEDURE — 87086 URINE CULTURE/COLONY COUNT: CPT | Performed by: PHYSICIAN ASSISTANT

## 2024-05-03 PROCEDURE — 0202U NFCT DS 22 TRGT SARS-COV-2: CPT | Performed by: PHYSICIAN ASSISTANT

## 2024-05-03 PROCEDURE — 83735 ASSAY OF MAGNESIUM: CPT | Performed by: PHYSICIAN ASSISTANT

## 2024-05-03 PROCEDURE — 70498 CT ANGIOGRAPHY NECK: CPT

## 2024-05-03 RX ORDER — OXYCODONE AND ACETAMINOPHEN 7.5; 325 MG/1; MG/1
1 TABLET ORAL ONCE
Status: COMPLETED | OUTPATIENT
Start: 2024-05-04 | End: 2024-05-04

## 2024-05-03 RX ORDER — SODIUM CHLORIDE 0.9 % (FLUSH) 0.9 %
10 SYRINGE (ML) INJECTION AS NEEDED
Status: DISCONTINUED | OUTPATIENT
Start: 2024-05-03 | End: 2024-05-04 | Stop reason: HOSPADM

## 2024-05-03 RX ORDER — ACETAMINOPHEN 500 MG
1000 TABLET ORAL ONCE
Status: DISCONTINUED | OUTPATIENT
Start: 2024-05-03 | End: 2024-05-04 | Stop reason: HOSPADM

## 2024-05-03 RX ADMIN — IOPAMIDOL 200 ML: 755 INJECTION, SOLUTION INTRAVENOUS at 20:02
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
## 2024-05-03 NOTE — LETTER
May 3, 2024     Charley Gomes DPM  [address] [place] [address] [address] [place] [address]    Patient: Erick Luong   YOB: 1955   Date of Visit: 5/3/2024       Dear Charley Gomes DPM,    Erick Luong was in my office today. Below are the relevant portions of my assessment and plan of care.           If you have questions, please do not hesitate to call me. I look forward to following Erick along with you.         Sincerely,        Julia Adrian MD        CC: No Recipients

## 2024-05-03 NOTE — PATIENT INSTRUCTIONS
Continue off antibiotics   Keep follow up with SUSAN Velásquez  Keep follow up with SUSAN Mitchell  Keep follow up with Dr. Gomes  Keep follow up with SANDY Avelar   Keep follow up with Dr. Lomas

## 2024-05-04 ENCOUNTER — APPOINTMENT (OUTPATIENT)
Dept: MRI IMAGING | Facility: HOSPITAL | Age: 69
End: 2024-05-04
Payer: COMMERCIAL

## 2024-05-04 VITALS
SYSTOLIC BLOOD PRESSURE: 133 MMHG | DIASTOLIC BLOOD PRESSURE: 73 MMHG | HEIGHT: 72 IN | RESPIRATION RATE: 20 BRPM | BODY MASS INDEX: 37.33 KG/M2 | HEART RATE: 69 BPM | OXYGEN SATURATION: 97 % | TEMPERATURE: 97.9 F | WEIGHT: 275.57 LBS

## 2024-05-04 PROCEDURE — A9577 INJ MULTIHANCE: HCPCS | Performed by: PHYSICIAN ASSISTANT

## 2024-05-04 PROCEDURE — 70553 MRI BRAIN STEM W/O & W/DYE: CPT

## 2024-05-04 PROCEDURE — 0 GADOBENATE DIMEGLUMINE 529 MG/ML SOLUTION: Performed by: PHYSICIAN ASSISTANT

## 2024-05-04 RX ADMIN — GADOBENATE DIMEGLUMINE 20 ML: 529 INJECTION, SOLUTION INTRAVENOUS at 01:33

## 2024-05-04 RX ADMIN — OXYCODONE HYDROCHLORIDE AND ACETAMINOPHEN 1 TABLET: 7.5; 325 TABLET ORAL at 00:25

## 2024-05-04 NOTE — DISCHARGE INSTRUCTIONS
Please continue to follow-up with all of your specialist as previously scheduled as well as your primary care provider within the next 48 hours for close outpatient monitoring.  Today your hemoglobin A1c is continuing to increase at 11.2.  It is very important that you are working on healthy diet choices and follow-up with your primary care provider for further management of your diabetes.  Should you develop any new or worsening symptoms please return to the ER for further evaluation.

## 2024-05-05 LAB — BACTERIA SPEC AEROBE CULT: NORMAL

## 2024-05-06 LAB
QT INTERVAL: 404 MS
QTC INTERVAL: 445 MS

## 2024-05-07 ENCOUNTER — READMISSION MANAGEMENT (OUTPATIENT)
Dept: CALL CENTER | Facility: HOSPITAL | Age: 69
End: 2024-05-07
Payer: COMMERCIAL

## 2024-05-08 LAB
BACTERIA SPEC AEROBE CULT: NORMAL
BACTERIA SPEC AEROBE CULT: NORMAL

## 2024-05-16 ENCOUNTER — TELEPHONE (OUTPATIENT)
Dept: CARDIOLOGY | Facility: CLINIC | Age: 69
End: 2024-05-16
Payer: COMMERCIAL

## 2024-05-17 ENCOUNTER — TELEPHONE (OUTPATIENT)
Dept: GASTROENTEROLOGY | Facility: CLINIC | Age: 69
End: 2024-05-17
Payer: COMMERCIAL

## 2024-05-17 NOTE — TELEPHONE ENCOUNTER
When I last saw this patient, he was stable from a cardiovascular standpoint.  He has a host of noncardiac issues which may increase his risk of any procedure, however if so long as he is not any new cardiac symptoms, he would not require any further cardiac testing prior to his procedure.  I would recommend continuation of aspirin 81 mg daily, however.  This patient is likely at increased risk for any procedure given his overall poor health, not necessarily secondary to his coronary artery disease, however.

## 2024-05-17 NOTE — TELEPHONE ENCOUNTER
[address] [facility] [address]  [facility] [address]  Phone: [phone]  Fax: [phone]            May 2, 2024              Dear Dr. Garay,      Erick Luong 1955 is being considered for colonoscopy with Dr Hannah.  According to Erick Luong He is currently under your care.  I am requesting cardiac clearance prior to pt undergoing procedure.        Approved By:     Date:                 Thank you,           Dr Cristopher Hannah

## 2024-05-20 ENCOUNTER — HOSPITAL ENCOUNTER (EMERGENCY)
Facility: HOSPITAL | Age: 69
Discharge: HOME | End: 2024-05-20
Attending: EMERGENCY MEDICINE | Admitting: EMERGENCY MEDICINE
Payer: COMMERCIAL

## 2024-05-20 ENCOUNTER — APPOINTMENT (OUTPATIENT)
Dept: CT IMAGING | Facility: HOSPITAL | Age: 69
End: 2024-05-20
Payer: COMMERCIAL

## 2024-05-20 VITALS
WEIGHT: 275.58 LBS | TEMPERATURE: 98.2 F | RESPIRATION RATE: 16 BRPM | HEIGHT: 72 IN | DIASTOLIC BLOOD PRESSURE: 83 MMHG | HEART RATE: 68 BPM | OXYGEN SATURATION: 99 % | SYSTOLIC BLOOD PRESSURE: 141 MMHG | BODY MASS INDEX: 37.33 KG/M2

## 2024-05-20 DIAGNOSIS — K62.5 RECTAL BLEEDING: ICD-10-CM

## 2024-05-20 DIAGNOSIS — N39.0 ACUTE UTI: Primary | ICD-10-CM

## 2024-05-20 LAB
ALBUMIN SERPL-MCNC: 4 G/DL (ref 3.5–5.2)
ALBUMIN/GLOB SERPL: 1.5 G/DL
ALP SERPL-CCNC: 84 U/L (ref 39–117)
ALT SERPL W P-5'-P-CCNC: 10 U/L (ref 1–41)
ANION GAP SERPL CALCULATED.3IONS-SCNC: 11 MMOL/L (ref 5–15)
AST SERPL-CCNC: 10 U/L (ref 1–40)
BACTERIA UR QL AUTO: ABNORMAL /HPF
BASOPHILS # BLD AUTO: 0.06 10*3/MM3 (ref 0–0.2)
BASOPHILS NFR BLD AUTO: 0.6 % (ref 0–1.5)
BILIRUB SERPL-MCNC: 0.4 MG/DL (ref 0–1.2)
BILIRUB UR QL STRIP: NEGATIVE
BUN SERPL-MCNC: 37 MG/DL (ref 8–23)
BUN/CREAT SERPL: 17.8 (ref 7–25)
CALCIUM SPEC-SCNC: 10.4 MG/DL (ref 8.6–10.5)
CHLORIDE SERPL-SCNC: 97 MMOL/L (ref 98–107)
CLARITY UR: ABNORMAL
CO2 SERPL-SCNC: 29 MMOL/L (ref 22–29)
COLOR UR: YELLOW
CREAT SERPL-MCNC: 2.08 MG/DL (ref 0.76–1.27)
D-LACTATE SERPL-SCNC: 1.6 MMOL/L (ref 0.5–2)
DEPRECATED RDW RBC AUTO: 46.6 FL (ref 37–54)
EGFRCR SERPLBLD CKD-EPI 2021: 34.3 ML/MIN/1.73
EOSINOPHIL # BLD AUTO: 0.4 10*3/MM3 (ref 0–0.4)
EOSINOPHIL NFR BLD AUTO: 4.1 % (ref 0.3–6.2)
ERYTHROCYTE [DISTWIDTH] IN BLOOD BY AUTOMATED COUNT: 14.9 % (ref 12.3–15.4)
GLOBULIN UR ELPH-MCNC: 2.7 GM/DL
GLUCOSE SERPL-MCNC: 366 MG/DL (ref 65–99)
GLUCOSE UR STRIP-MCNC: ABNORMAL MG/DL
HCT VFR BLD AUTO: 33.8 % (ref 37.5–51)
HGB BLD-MCNC: 10.8 G/DL (ref 13–17.7)
HGB UR QL STRIP.AUTO: ABNORMAL
HYALINE CASTS UR QL AUTO: ABNORMAL /LPF
IMM GRANULOCYTES # BLD AUTO: 0.06 10*3/MM3 (ref 0–0.05)
IMM GRANULOCYTES NFR BLD AUTO: 0.6 % (ref 0–0.5)
KETONES UR QL STRIP: NEGATIVE
LEUKOCYTE ESTERASE UR QL STRIP.AUTO: ABNORMAL
LIPASE SERPL-CCNC: 55 U/L (ref 13–60)
LYMPHOCYTES # BLD AUTO: 1.98 10*3/MM3 (ref 0.7–3.1)
LYMPHOCYTES NFR BLD AUTO: 20.4 % (ref 19.6–45.3)
MCH RBC QN AUTO: 27.4 PG (ref 26.6–33)
MCHC RBC AUTO-ENTMCNC: 32 G/DL (ref 31.5–35.7)
MCV RBC AUTO: 85.8 FL (ref 79–97)
MONOCYTES # BLD AUTO: 0.79 10*3/MM3 (ref 0.1–0.9)
MONOCYTES NFR BLD AUTO: 8.1 % (ref 5–12)
NEUTROPHILS NFR BLD AUTO: 6.42 10*3/MM3 (ref 1.7–7)
NEUTROPHILS NFR BLD AUTO: 66.2 % (ref 42.7–76)
NITRITE UR QL STRIP: NEGATIVE
NRBC BLD AUTO-RTO: 0 /100 WBC (ref 0–0.2)
PH UR STRIP.AUTO: <=5 [PH] (ref 5–8)
PLATELET # BLD AUTO: 235 10*3/MM3 (ref 140–450)
PMV BLD AUTO: 12.4 FL (ref 6–12)
POTASSIUM SERPL-SCNC: 4 MMOL/L (ref 3.5–5.2)
PROT SERPL-MCNC: 6.7 G/DL (ref 6–8.5)
PROT UR QL STRIP: ABNORMAL
RBC # BLD AUTO: 3.94 10*6/MM3 (ref 4.14–5.8)
RBC # UR STRIP: ABNORMAL /HPF
REF LAB TEST METHOD: ABNORMAL
SODIUM SERPL-SCNC: 137 MMOL/L (ref 136–145)
SP GR UR STRIP: 1.02 (ref 1–1.03)
SQUAMOUS #/AREA URNS HPF: ABNORMAL /HPF
UROBILINOGEN UR QL STRIP: ABNORMAL
WBC # UR STRIP: ABNORMAL /HPF
WBC NRBC COR # BLD AUTO: 9.71 10*3/MM3 (ref 3.4–10.8)

## 2024-05-20 PROCEDURE — 83605 ASSAY OF LACTIC ACID: CPT

## 2024-05-20 PROCEDURE — 81001 URINALYSIS AUTO W/SCOPE: CPT

## 2024-05-20 PROCEDURE — 80053 COMPREHEN METABOLIC PANEL: CPT

## 2024-05-20 PROCEDURE — 87086 URINE CULTURE/COLONY COUNT: CPT

## 2024-05-20 PROCEDURE — 87186 SC STD MICRODIL/AGAR DIL: CPT

## 2024-05-20 PROCEDURE — 96365 THER/PROPH/DIAG IV INF INIT: CPT

## 2024-05-20 PROCEDURE — 87040 BLOOD CULTURE FOR BACTERIA: CPT | Performed by: EMERGENCY MEDICINE

## 2024-05-20 PROCEDURE — 74177 CT ABD & PELVIS W/CONTRAST: CPT

## 2024-05-20 PROCEDURE — 25510000001 IOPAMIDOL 61 % SOLUTION: Performed by: EMERGENCY MEDICINE

## 2024-05-20 PROCEDURE — 99285 EMERGENCY DEPT VISIT HI MDM: CPT

## 2024-05-20 PROCEDURE — 25010000002 LEVOFLOXACIN PER 250 MG: Performed by: EMERGENCY MEDICINE

## 2024-05-20 PROCEDURE — 83690 ASSAY OF LIPASE: CPT

## 2024-05-20 PROCEDURE — 36415 COLL VENOUS BLD VENIPUNCTURE: CPT

## 2024-05-20 PROCEDURE — 87077 CULTURE AEROBIC IDENTIFY: CPT

## 2024-05-20 PROCEDURE — 85025 COMPLETE CBC W/AUTO DIFF WBC: CPT

## 2024-05-20 RX ORDER — CIPROFLOXACIN 500 MG/1
500 TABLET, FILM COATED ORAL 2 TIMES DAILY
Qty: 20 TABLET | Refills: 0 | Status: SHIPPED | OUTPATIENT
Start: 2024-05-20

## 2024-05-20 RX ORDER — LEVOFLOXACIN 5 MG/ML
500 INJECTION, SOLUTION INTRAVENOUS ONCE
Status: COMPLETED | OUTPATIENT
Start: 2024-05-20 | End: 2024-05-20

## 2024-05-20 RX ADMIN — IOPAMIDOL 100 ML: 612 INJECTION, SOLUTION INTRAVENOUS at 17:01

## 2024-05-20 RX ADMIN — LEVOFLOXACIN 500 MG: 500 INJECTION, SOLUTION INTRAVENOUS at 17:52

## 2024-05-20 NOTE — ED NOTES
MEDICAL SCREENING    Reason for Visit: Patient presents with lower abdominal pain and rectal bleeding. Reports black stools and bright red stools that started yesterday. He is supposed to be having a colonoscopy soon, but GI has sent him here for further evaluation before the colonoscopy in a couple of weeks. Reports nausea, no vomiting or fevers. He is having diarrhea. Reports poor appetite.     Patient initially seen in triage.  The patient was advised further evaluation and diagnostic testing will be needed, some of the treatment and testing will be initiated in the lobby in order to begin the process.  The patient will be returned to the waiting area for the time being and will  be reassessed by a subsequent ED provider.  The patient will be brought back to the treatment area in as timely manner as possible.       Annika Zayas, APRN  05/20/24 1505

## 2024-05-20 NOTE — ED PROVIDER NOTES
Subjective   History of Present Illness  She presents with a complaint of abdominal pain attention been going on for about 2 months.  Is generalized in his abdomen.  He points to the center than his his hand over the right and left.  He had some nausea but no actual vomiting.  He says in the last couple days he has noticed some black and then some bright red blood in his stool.  He has seen GI and they want to get him colonoscopy with it and come here to get checked out before they did the colonoscopy.  The black and bloody stool was yesterday and he has not seen him today.    History provided by:  Patient   used: No    Abdominal Pain  Pain location:  Generalized  Pain quality: aching    Pain radiates to:  Does not radiate  Pain severity:  Moderate  Onset quality:  Gradual  Duration:  8 weeks  Timing:  Constant  Progression:  Unchanged  Chronicity:  New  Context: not alcohol use, not awakening from sleep, not diet changes, not eating, not laxative use, not medication withdrawal, not previous surgeries, not recent illness, not recent sexual activity, not recent travel, not retching, not sick contacts, not suspicious food intake and not trauma    Relieved by:  Nothing  Worsened by:  Nothing  Ineffective treatments:  None tried  Associated symptoms: hematochezia, melena and nausea    Associated symptoms: no anorexia, no belching, no chest pain, no chills, no constipation, no cough, no diarrhea, no dysuria, no fatigue, no fever, no flatus, no hematemesis, no hematuria, no shortness of breath, no sore throat, no vaginal bleeding and no vaginal discharge    Risk factors: no alcohol abuse, no aspirin use, not elderly, has not had multiple surgeries, no NSAID use, not obese, not pregnant and no recent hospitalization        Review of Systems   Constitutional: Negative.  Negative for chills, fatigue and fever.   HENT: Negative.  Negative for sore throat.    Respiratory: Negative.  Negative for cough and  "shortness of breath.    Cardiovascular: Negative.  Negative for chest pain.   Gastrointestinal: Negative.  Positive for abdominal pain, hematochezia, melena and nausea. Negative for anorexia, constipation, diarrhea, flatus and hematemesis.   Genitourinary: Negative.  Negative for dysuria, hematuria, vaginal bleeding and vaginal discharge.   Musculoskeletal: Negative.    Skin: Negative.    Neurological: Negative.    Psychiatric/Behavioral: Negative.     All other systems reviewed and are negative.      Past Medical History:   Diagnosis Date    Arthritis     Autonomic disease     CAD (coronary artery disease) 02/06/2017    Cervical radiculopathy 09/16/2021    Chronic constipation with acute exaccerbation 05/10/2021    Coronary artery disease     Degeneration of cervical intervertebral disc 08/11/2021    Diabetes mellitus     Diabetic foot ulcer 08/31/2020    Diabetic polyneuropathy associated with type 2 diabetes mellitus 01/18/2021    Elevated cholesterol     Gastroesophageal reflux disease 05/13/2019    Headache     HTN (hypertension), benign 05/03/2017    Hyperlipidemia     Hypertension     Mixed hyperlipidemia 02/07/2017    Multiple lung nodules 01/26/2020    5mm, 9 mm RLL identified 1/2020, not present 10/2019.    Myocardial infarction     Osteomyelitis 01/22/2020    Osteomyelitis of fifth toe of right foot 10/07/2019    Pancreatitis     Persistent insomnia 01/20/2020    Renal disorder     Sleep apnea 02/06/2017    Sleep apnea with use of continuous positive airway pressure (CPAP)     NON-COMPLIANT    Slow transit constipation 01/16/2019    Spinal stenosis in cervical region 09/16/2021    Vitamin D deficiency 03/02/2021       Allergies   Allergen Reactions    Cefepime Hives and Anaphylaxis    Bactrim [Sulfamethoxazole-Trimethoprim] Other (See Comments)     \"RENAL FAILURE\"    Vancomycin Itching    Zolpidem Mental Status Change     \"makes him crazy\"    Metronidazole Rash       Past Surgical History:   Procedure " Laterality Date    ABDOMINAL SURGERY      AMPUTATION FOOT / TOE Left 10/2021    5th digit     ANTERIOR CERVICAL DISCECTOMY W/ FUSION N/A 08/05/2022    Procedure: CERVICAL DISCECTOMY ANTERIOR WITH FUSION C5-6 with possible plating of C5-7 with neuromonitoring and 1 c-arm;  Surgeon: Karel Soliz MD;  Location:  [facility] OR;  Service: Neurosurgery;  Laterality: N/A;    APPENDECTOMY      BACK SURGERY      CARDIAC CATHETERIZATION Left 02/08/2021    Procedure: Left Heart Cath w poss intervention left anatomical snuff box acess;  Surgeon: Omkar Charles DO;  Location:  [facility]ATH [facility];  Service: Cardiology;  Laterality: Left;    CARDIAC SURGERY      CATARACT EXTRACTION      CERVICAL SPINE SURGERY      COLONOSCOPY N/A 01/31/2017    Normal exam repeat in 5 years    COLONOSCOPY N/A 02/11/2019    Mild acute inflammation    COLONOSCOPY N/A 04/07/2024    2 areas at 10 and 20 cm with friability ulceration 2 clips placed at 20 cm and 4 clips at 10 cm poor prep normal mucosa,mild eroisions and ulcerations in visible vessels    COLONOSCOPY W/ POLYPECTOMY  03/04/2014    Hyperplastic polyp    CORONARY ARTERY BYPASS GRAFT  10/2015    ENDOSCOPY  04/13/2011    Gastritis with hemorrhage    ENDOSCOPY N/A 05/05/2017    Normal exam    ENDOSCOPY N/A 02/11/2019    Gastritis    ENDOSCOPY N/A 09/01/2020    Non-erosive gastritis with hemorrhage    ENDOSCOPY N/A 02/10/2021    Esophagitis    ENDOSCOPY N/A 04/11/2024    There were esophageal mucosal changes suspicious for short-segment Low's esophagus present in the distal esophagus. The maximum longitudinal extent of these mucosal changes was 2 cm in length. Mucosa was biopsied with a cold forceps for histologyDistal esophagus, biopsies: Mild chronic active esophagogastritis. No evidence of intestinal metaplasia, dysplasia. Antrum, bx, Mild chronic gastritis    FOOT SURGERY Left     INCISION AND DRAINAGE OF WOUND Left 09/2007    spider bite       Family History    Problem Relation Age of Onset    Colon cancer Father     Heart disease Father     Colon cancer Sister     Colon polyps Sister     Alzheimer's disease Mother     Coronary artery disease Sister     Coronary artery disease Sister        Social History     Socioeconomic History    Marital status:    Tobacco Use    Smoking status: Former     Current packs/day: 0.00     Types: Cigarettes     Quit date:      Years since quittin.4    Smokeless tobacco: Never    Tobacco comments:     smoked in highschool   Vaping Use    Vaping status: Never Used   Substance and Sexual Activity    Alcohol use: No    Drug use: No    Sexual activity: Defer       Prior to Admission medications    Medication Sig Start Date End Date Taking? Authorizing Provider   ascorbic acid (VITAMIN C) 1000 MG tablet Take 1 tablet by mouth Daily. 23      bumetanide (BUMEX) 2 MG tablet Take 1 tablet by mouth Daily.  Patient taking differently: Take 1 tablet by mouth 2 (Two) Times a Day. 24   Rene Maloney MD   calcitriol (ROCALTROL) 0.5 MCG capsule Take 1 capsule by mouth Daily. 23      carvedilol (COREG) 3.125 MG tablet Take 1 tablet twice a day by oral route. 3/26/24      Cholecalciferol (D-5000) 125 MCG (5000 UT) tablet Take 1 tablet by mouth Daily Before Lunch. 23      Diclofenac Sodium (VOLTAREN) 1 % gel gel Apply 2 g topically to the appropriate area as directed 4 (Four) Times a Day As Needed. 23      donepezil (ARICEPT) 10 MG tablet Take 1 tablet by mouth Daily. 5/3/24      DULoxetine (CYMBALTA) 60 MG capsule Take 1 capsule by mouth Daily. 3/11/24      famotidine (PEPCID) 20 MG tablet Take 1 tablet twice a day by oral route. 3/26/24      fluticasone (FLONASE) 50 MCG/ACT nasal spray Instill 1 spray in each nostril as directed by provider Daily. 24      Insulin Glargine (Lantus SoloStar) 100 UNIT/ML injection pen Inject 20 Units under the skin into the appropriate area as directed Every Evening.  Patient  not taking: Reported on 5/3/2024 3/11/24      Insulin Glargine (Lantus SoloStar) 100 UNIT/ML injection pen Inject 20 Units under the skin into the appropriate area as directed Every Night. 4/18/24      Insulin Glargine (Lantus SoloStar) 100 UNIT/ML injection pen Inject 20 Units under the skin into the appropriate area as directed every night at bedtime. 5/9/24      Insulin Regular Human, Conc, (HumuLIN R U-500 KwikPen) 500 UNIT/ML solution pen-injector CONCENTRATED injection Inject 15 Units under the skin into the appropriate area as directed 3 (Three) Times a Day Before Meals AND 40 Units Daily. 4/11/24   Rene Maloney MD   Iron-Vitamin C (Vitron-C)  MG tablet Take 1 tablet twice a day by oral route. 4/18/24      isosorbide mononitrate (IMDUR) 30 MG 24 hr tablet Take 1 tablet by mouth Daily.    ProviderBossman MD   levocetirizine (XYZAL) 5 MG tablet Take 1 tablet by mouth every day at bedtime. 5/9/24      melatonin 3 MG tablet Take 2 tablets by mouth At Night As Needed for Sleep. 4/11/24   Rene Maloney MD   methylcellulose (Citrucel) oral powder Mix 5g as directed and drink twice daily for 90 days. 3/26/24 6/24/24     metoclopramide (REGLAN) 5 MG tablet Take 1 tablet by mouth 3 times a day.  Patient not taking: Reported on 5/3/2024    Bossman Blount MD   midodrine (PROAMATINE) 10 MG tablet Take 1 tablet by mouth 3 (Three) Times a Day. 5/6/24      midodrine (PROAMATINE) 2.5 MG tablet Take 1 tablet by mouth 3 (Three) Times a Day Before Meals.    ProviderBossman MD   ondansetron ODT (ZOFRAN-ODT) 4 MG disintegrating tablet Place 1 tablet on the tongue Every 8 (Eight) Hours As Needed for Nausea or Vomiting. 4/11/24   Rene Maloney MD   oxyCODONE (ROXICODONE) 10 MG tablet Take 1 tablet by mouth 2 (Two) Times a Day As Needed. 5/17/24      pantoprazole (Protonix) 40 MG EC tablet Take 1 tablet by mouth 2 (Two) Times a Day. 11/8/22      polyethylene glycol (MIRALAX) 17 g packet  Take 17 g by mouth Daily. Obtain OTC 8/23/23   Lexie Houston APRN   pregabalin (LYRICA) 100 MG capsule Take 2 capsules by mouth Every Night.    Bossman Blount MD   pregabalin (LYRICA) 50 MG capsule Take 1 capsule by mouth Daily.    Bossman Blount MD   rosuvastatin (CRESTOR) 10 MG tablet Take 1 tablet by mouth Daily.    Bossman Blount MD   rosuvastatin (CRESTOR) 10 MG tablet Take 1 tablet by mouth Every Night. 5/9/24      sennosides-docusate (PERICOLACE) 8.6-50 MG per tablet Take 1 tablet by mouth Every Night. Obtain OTC 8/23/23   Lexie Houston APRN   sodium hypochlorite (DAKIN'S 1/4 STRENGTH) 0.125 % solution topical solution 0.125% Apply solution three times daily 4/15/24      sodium hypochlorite (DAKIN'S 1/4 STRENGTH) 0.125 % solution topical solution 0.125% Apply 1 application topically to the appropriate area as directed 2 (Two) Times a Day. 5/13/24      sucralfate (CARAFATE) 1 g tablet Take 1 tablet by mouth 3 times a day.    Bossman Blount MD   sucralfate (CARAFATE) 1 g tablet Take 1 tablet by mouth 4 (Four) Times a Day before meals. 5/3/24      tamsulosin (FLOMAX) 0.4 MG capsule 24 hr capsule Take 1 capsule by mouth Daily. 8/7/23      timolol (TIMOPTIC) 0.5 % ophthalmic solution Administer 1 drop to both eyes 2 (Two) Times a Day.    Bossman Blount MD   valsartan (DIOVAN) 40 MG tablet Take 1 tablet by mouth Daily.    Bossman Blount MD   valsartan (DIOVAN) 40 MG tablet Take 1 tablet by mouth Every Night. 5/9/24      zinc sulfate (ZINCATE) 50 MG capsule Take 1 capsule by mouth Daily.    Bossman Blount MD   spironolactone (ALDACTONE) 25 MG tablet Take 1 tablet by mouth Daily. 9/28/20 12/31/20  Del Shetty MD       Medications   iopamidol (ISOVUE-300) 61 % injection 100 mL (100 mL Intravenous Given 5/20/24 1701)   levoFLOXacin (LEVAQUIN) 500 mg/100 mL D5W (premix) (LEVAQUIN) 500 mg (0 mg Intravenous Stopped 5/20/24 1855)       Vitals:    05/20/24 1831   BP:  141/83   Pulse: 68   Resp:    Temp:    SpO2: 99%         Objective   Physical Exam  Vitals and nursing note reviewed.   Constitutional:       Appearance: Normal appearance.   HENT:      Head: Normocephalic and atraumatic.   Eyes:      Extraocular Movements: Extraocular movements intact.      Pupils: Pupils are equal, round, and reactive to light.   Cardiovascular:      Rate and Rhythm: Normal rate and regular rhythm.   Pulmonary:      Effort: Pulmonary effort is normal.      Breath sounds: Normal breath sounds.   Abdominal:      Palpations: Abdomen is soft.      Tenderness: There is abdominal tenderness.      Comments: Patient is mildly tender to palpation diffusely but no rebound or percussion tenderness is noted.  There is no localization.   Musculoskeletal:         General: Normal range of motion.      Cervical back: Normal range of motion and neck supple.   Skin:     General: Skin is warm and dry.   Neurological:      General: No focal deficit present.      Mental Status: He is alert and oriented to person, place, and time.   Psychiatric:         Mood and Affect: Mood normal.         Behavior: Behavior normal.         Procedures         Lab Results (last 24 hours)       Procedure Component Value Units Date/Time    CBC & Differential [[id number]]  (Abnormal) Collected: 05/20/24 1514    Specimen: Blood Updated: 05/20/24 1540    Narrative:      The following orders were created for panel order CBC & Differential.  Procedure                               Abnormality         Status                     ---------                               -----------         ------                     CBC Auto Differential[[id number]]        Abnormal            Final result                 Please view results for these tests on the individual orders.    Comprehensive Metabolic Panel [[id number]]  (Abnormal) Collected: 05/20/24 1514    Specimen: Blood Updated: 05/20/24 1603     Glucose 366 mg/dL      BUN 37 mg/dL      Creatinine 2.08  mg/dL      Sodium 137 mmol/L      Potassium 4.0 mmol/L      Chloride 97 mmol/L      CO2 29.0 mmol/L      Calcium 10.4 mg/dL      Total Protein 6.7 g/dL      Albumin 4.0 g/dL      ALT (SGPT) 10 U/L      AST (SGOT) 10 U/L      Alkaline Phosphatase 84 U/L      Total Bilirubin 0.4 mg/dL      Globulin 2.7 gm/dL      A/G Ratio 1.5 g/dL      BUN/Creatinine Ratio 17.8     Anion Gap 11.0 mmol/L      eGFR 34.3 mL/min/1.73     Narrative:      GFR Normal >60  Chronic Kidney Disease <60  Kidney Failure <15      Lipase [[id number]]  (Normal) Collected: 05/20/24 1514    Specimen: Blood Updated: 05/20/24 1558     Lipase 55 U/L     Lactic Acid, Plasma [[id number]]  (Normal) Collected: 05/20/24 1514    Specimen: Blood Updated: 05/20/24 1558     Lactate 1.6 mmol/L     CBC Auto Differential [[id number]]  (Abnormal) Collected: 05/20/24 1514    Specimen: Blood Updated: 05/20/24 1540     WBC 9.71 10*3/mm3      RBC 3.94 10*6/mm3      Hemoglobin 10.8 g/dL      Hematocrit 33.8 %      MCV 85.8 fL      MCH 27.4 pg      MCHC 32.0 g/dL      RDW 14.9 %      RDW-SD 46.6 fl      MPV 12.4 fL      Platelets 235 10*3/mm3      Neutrophil % 66.2 %      Lymphocyte % 20.4 %      Monocyte % 8.1 %      Eosinophil % 4.1 %      Basophil % 0.6 %      Immature Grans % 0.6 %      Neutrophils, Absolute 6.42 10*3/mm3      Lymphocytes, Absolute 1.98 10*3/mm3      Monocytes, Absolute 0.79 10*3/mm3      Eosinophils, Absolute 0.40 10*3/mm3      Basophils, Absolute 0.06 10*3/mm3      Immature Grans, Absolute 0.06 10*3/mm3      nRBC 0.0 /100 WBC     Urinalysis With Culture If Indicated - Urine, Clean Catch [[id number]]  (Abnormal) Collected: 05/20/24 1527    Specimen: Urine, Clean Catch Updated: 05/20/24 1543     Color, UA Yellow     Appearance, UA Turbid     pH, UA <=5.0     Specific Gravity, UA 1.019     Glucose, UA >=1000 mg/dL (3+)     Ketones, UA Negative     Bilirubin, UA Negative     Blood, UA Moderate (2+)     Protein,  mg/dL (2+)     Leuk Esterase, UA Large  (3+)     Nitrite, UA Negative     Urobilinogen, UA 0.2 E.U./dL    Narrative:      In absence of clinical symptoms, the presence of pyuria, bacteria, and/or nitrites on the urinalysis result does not correlate with infection.    Urinalysis, Microscopic Only - Urine, Clean Catch [[id number]]  (Abnormal) Collected: 05/20/24 1527    Specimen: Urine, Clean Catch Updated: 05/20/24 1543     RBC, UA 6-10 /HPF      WBC, UA Too Numerous to Count /HPF      Bacteria, UA 4+ /HPF      Squamous Epithelial Cells, UA 3-6 /HPF      Hyaline Casts, UA None Seen /LPF      Methodology Automated Microscopy    Urine Culture - Urine, Urine, Clean Catch [[id number]] Collected: 05/20/24 1527    Specimen: Urine, Clean Catch Updated: 05/20/24 1543    Blood Culture - Blood, Arm, Right [[id number]] Collected: 05/20/24 1749    Specimen: Blood from Arm, Right Updated: 05/20/24 1801    Blood Culture - Blood, Arm, Left [[id number]] Collected: 05/20/24 1749    Specimen: Blood from Arm, Left Updated: 05/20/24 1801            CT Abdomen Pelvis With Contrast   Final Result   1. There are surgical clips of the rectum likely related to biopsies   obtained from colonoscopy on 4/7/2024. There is no free air or abscess   collection. Moderate fecal stasis. No small bowel dilatation. No region   of contrast extravasation identified within the lumen of the bowel.       2. Mild diffuse bladder wall thickening likely artifactual due to under   distention. Correlation for signs of cystitis recommended. Right renal   cyst requiring no routine follow-up.       3. Mild to moderate vascular calcification with no regional aneurysm or   dissection.       This report was signed and finalized on 5/20/2024 5:29 PM by Dr. Yuliana Cahloun MD.              ED Course          MDM  Number of Diagnoses or Management Options  Acute UTI: new and requires workup  Rectal bleeding: new and requires workup  Diagnosis management comments: I told the patient his CT scan does not reveal any  signs of colitis or tumors or anything like that.  There are the clips from the recent colonoscopy that he had but that colonoscopy apparently only covered about a third of his colon and that is why they wanted to repeat.  His blood count today is okay.  He does have a significant urinary tract infection so we will treat that.  Otherwise he can proceed with his workup for his colonoscopy in the future.  He is discharged in stable condition.       Amount and/or Complexity of Data Reviewed  Clinical lab tests: ordered and reviewed  Tests in the radiology section of CPT®: ordered and reviewed  Decide to obtain previous medical records or to obtain history from someone other than the patient: yes    Risk of Complications, Morbidity, and/or Mortality  Presenting problems: moderate  Diagnostic procedures: moderate  Management options: moderate    Patient Progress  Patient progress: stable        Final diagnoses:   Acute UTI   Rectal bleeding          Karel Arriaza Jr., MD  05/21/24 0683

## 2024-05-20 NOTE — TELEPHONE ENCOUNTER
Patients spouse called back in and they would like to get him set up for colonscopy but they stated last night he did have dark tarry stools and bright red blood. He is having moderate abdominal pain now. Due to his symptoms Carolyn advised he get checked out at the ER and then we will try to proceed with colonoscopy outpatient. Patients spouse voiced understanding.

## 2024-05-22 ENCOUNTER — HOSPITAL ENCOUNTER (OUTPATIENT)
Dept: ULTRASOUND IMAGING | Facility: HOSPITAL | Age: 69
Discharge: HOME | End: 2024-05-22
Payer: MEDICARE

## 2024-05-22 LAB — BACTERIA SPEC AEROBE CULT: ABNORMAL

## 2024-05-25 LAB
BACTERIA SPEC AEROBE CULT: NORMAL
BACTERIA SPEC AEROBE CULT: NORMAL

## 2024-05-29 ENCOUNTER — PREP FOR SURGERY (OUTPATIENT)
Dept: GASTROENTEROLOGY | Facility: CLINIC | Age: 69
End: 2024-05-29
Payer: COMMERCIAL

## 2024-05-29 ENCOUNTER — TELEPHONE (OUTPATIENT)
Dept: GASTROENTEROLOGY | Facility: CLINIC | Age: 69
End: 2024-05-29
Payer: COMMERCIAL

## 2024-05-29 DIAGNOSIS — R19.7 DIARRHEA, UNSPECIFIED TYPE: ICD-10-CM

## 2024-05-29 DIAGNOSIS — K59.01 SLOW TRANSIT CONSTIPATION: Primary | ICD-10-CM

## 2024-05-30 PROBLEM — K59.01 SLOW TRANSIT CONSTIPATION: Status: ACTIVE | Noted: 2024-05-29

## 2024-05-30 PROBLEM — R19.7 DIARRHEA: Status: ACTIVE | Noted: 2024-05-29

## 2024-06-07 ENCOUNTER — TELEPHONE (OUTPATIENT)
Dept: VASCULAR SURGERY | Facility: CLINIC | Age: 69
End: 2024-06-07
Payer: COMMERCIAL

## 2024-06-27 ENCOUNTER — APPOINTMENT (OUTPATIENT)
Dept: CT IMAGING | Facility: HOSPITAL | Age: 69
End: 2024-06-27
Payer: COMMERCIAL

## 2024-06-27 ENCOUNTER — HOSPITAL ENCOUNTER (EMERGENCY)
Facility: HOSPITAL | Age: 69
Discharge: HOME | End: 2024-06-27
Attending: EMERGENCY MEDICINE
Payer: COMMERCIAL

## 2024-06-27 VITALS
OXYGEN SATURATION: 99 % | RESPIRATION RATE: 18 BRPM | TEMPERATURE: 98.5 F | HEIGHT: 72 IN | HEART RATE: 70 BPM | SYSTOLIC BLOOD PRESSURE: 152 MMHG | WEIGHT: 280 LBS | BODY MASS INDEX: 37.93 KG/M2 | DIASTOLIC BLOOD PRESSURE: 78 MMHG

## 2024-06-27 DIAGNOSIS — N39.0 URINARY TRACT INFECTION WITHOUT HEMATURIA, SITE UNSPECIFIED: ICD-10-CM

## 2024-06-27 DIAGNOSIS — K59.00 CONSTIPATION, UNSPECIFIED CONSTIPATION TYPE: ICD-10-CM

## 2024-06-27 DIAGNOSIS — R10.9 ABDOMINAL PAIN, UNSPECIFIED ABDOMINAL LOCATION: Primary | ICD-10-CM

## 2024-06-27 DIAGNOSIS — R73.9 HYPERGLYCEMIA: ICD-10-CM

## 2024-06-27 LAB
ALBUMIN SERPL-MCNC: 3.6 G/DL (ref 3.5–5.2)
ALBUMIN/GLOB SERPL: 1.3 G/DL
ALP SERPL-CCNC: 108 U/L (ref 39–117)
ALT SERPL W P-5'-P-CCNC: 10 U/L (ref 1–41)
ANION GAP SERPL CALCULATED.3IONS-SCNC: 12 MMOL/L (ref 5–15)
AST SERPL-CCNC: 12 U/L (ref 1–40)
BACTERIA UR QL AUTO: ABNORMAL /HPF
BASOPHILS # BLD AUTO: 0.05 10*3/MM3 (ref 0–0.2)
BASOPHILS NFR BLD AUTO: 0.6 % (ref 0–1.5)
BILIRUB SERPL-MCNC: 0.4 MG/DL (ref 0–1.2)
BILIRUB UR QL STRIP: NEGATIVE
BUN SERPL-MCNC: 31 MG/DL (ref 8–23)
BUN/CREAT SERPL: 16.7 (ref 7–25)
CALCIUM SPEC-SCNC: 9 MG/DL (ref 8.6–10.5)
CHLORIDE SERPL-SCNC: 106 MMOL/L (ref 98–107)
CLARITY UR: ABNORMAL
CO2 SERPL-SCNC: 22 MMOL/L (ref 22–29)
COLOR UR: YELLOW
CREAT SERPL-MCNC: 1.86 MG/DL (ref 0.76–1.27)
DEPRECATED RDW RBC AUTO: 48 FL (ref 37–54)
EGFRCR SERPLBLD CKD-EPI 2021: 38.9 ML/MIN/1.73
EOSINOPHIL # BLD AUTO: 0.45 10*3/MM3 (ref 0–0.4)
EOSINOPHIL NFR BLD AUTO: 5.4 % (ref 0.3–6.2)
ERYTHROCYTE [DISTWIDTH] IN BLOOD BY AUTOMATED COUNT: 14.9 % (ref 12.3–15.4)
GLOBULIN UR ELPH-MCNC: 2.8 GM/DL
GLUCOSE SERPL-MCNC: 394 MG/DL (ref 65–99)
GLUCOSE UR STRIP-MCNC: ABNORMAL MG/DL
HCT VFR BLD AUTO: 32.5 % (ref 37.5–51)
HGB BLD-MCNC: 10.3 G/DL (ref 13–17.7)
HGB UR QL STRIP.AUTO: ABNORMAL
HYALINE CASTS UR QL AUTO: ABNORMAL /LPF
IMM GRANULOCYTES # BLD AUTO: 0.06 10*3/MM3 (ref 0–0.05)
IMM GRANULOCYTES NFR BLD AUTO: 0.7 % (ref 0–0.5)
INR PPP: 0.98 (ref 0.91–1.09)
KETONES UR QL STRIP: NEGATIVE
LEUKOCYTE ESTERASE UR QL STRIP.AUTO: ABNORMAL
LIPASE SERPL-CCNC: 36 U/L (ref 13–60)
LYMPHOCYTES # BLD AUTO: 1.25 10*3/MM3 (ref 0.7–3.1)
LYMPHOCYTES NFR BLD AUTO: 15.1 % (ref 19.6–45.3)
MAGNESIUM SERPL-MCNC: 2.2 MG/DL (ref 1.6–2.4)
MCH RBC QN AUTO: 27.7 PG (ref 26.6–33)
MCHC RBC AUTO-ENTMCNC: 31.7 G/DL (ref 31.5–35.7)
MCV RBC AUTO: 87.4 FL (ref 79–97)
MONOCYTES # BLD AUTO: 0.85 10*3/MM3 (ref 0.1–0.9)
MONOCYTES NFR BLD AUTO: 10.3 % (ref 5–12)
NEUTROPHILS NFR BLD AUTO: 5.62 10*3/MM3 (ref 1.7–7)
NEUTROPHILS NFR BLD AUTO: 67.9 % (ref 42.7–76)
NITRITE UR QL STRIP: POSITIVE
NRBC BLD AUTO-RTO: 0 /100 WBC (ref 0–0.2)
PH UR STRIP.AUTO: 5.5 [PH] (ref 5–8)
PLATELET # BLD AUTO: 215 10*3/MM3 (ref 140–450)
PMV BLD AUTO: 11.9 FL (ref 6–12)
POTASSIUM SERPL-SCNC: 4.3 MMOL/L (ref 3.5–5.2)
PROT SERPL-MCNC: 6.4 G/DL (ref 6–8.5)
PROT UR QL STRIP: ABNORMAL
PROTHROMBIN TIME: 13.3 SECONDS (ref 11.8–14.8)
RBC # BLD AUTO: 3.72 10*6/MM3 (ref 4.14–5.8)
RBC # UR STRIP: ABNORMAL /HPF
REF LAB TEST METHOD: ABNORMAL
SODIUM SERPL-SCNC: 140 MMOL/L (ref 136–145)
SP GR UR STRIP: 1.02 (ref 1–1.03)
SQUAMOUS #/AREA URNS HPF: ABNORMAL /HPF
UROBILINOGEN UR QL STRIP: ABNORMAL
WBC # UR STRIP: ABNORMAL /HPF
WBC NRBC COR # BLD AUTO: 8.28 10*3/MM3 (ref 3.4–10.8)

## 2024-06-27 PROCEDURE — 99284 EMERGENCY DEPT VISIT MOD MDM: CPT

## 2024-06-27 PROCEDURE — 36415 COLL VENOUS BLD VENIPUNCTURE: CPT

## 2024-06-27 PROCEDURE — 87186 SC STD MICRODIL/AGAR DIL: CPT | Performed by: EMERGENCY MEDICINE

## 2024-06-27 PROCEDURE — 81001 URINALYSIS AUTO W/SCOPE: CPT | Performed by: EMERGENCY MEDICINE

## 2024-06-27 PROCEDURE — 80053 COMPREHEN METABOLIC PANEL: CPT | Performed by: EMERGENCY MEDICINE

## 2024-06-27 PROCEDURE — 87077 CULTURE AEROBIC IDENTIFY: CPT | Performed by: EMERGENCY MEDICINE

## 2024-06-27 PROCEDURE — 25810000003 LACTATED RINGERS SOLUTION: Performed by: EMERGENCY MEDICINE

## 2024-06-27 PROCEDURE — 87086 URINE CULTURE/COLONY COUNT: CPT | Performed by: EMERGENCY MEDICINE

## 2024-06-27 PROCEDURE — 83735 ASSAY OF MAGNESIUM: CPT | Performed by: EMERGENCY MEDICINE

## 2024-06-27 PROCEDURE — 74176 CT ABD & PELVIS W/O CONTRAST: CPT

## 2024-06-27 PROCEDURE — 87181 SC STD AGAR DILUTION PER AGT: CPT | Performed by: EMERGENCY MEDICINE

## 2024-06-27 PROCEDURE — 83690 ASSAY OF LIPASE: CPT | Performed by: EMERGENCY MEDICINE

## 2024-06-27 PROCEDURE — 85610 PROTHROMBIN TIME: CPT | Performed by: EMERGENCY MEDICINE

## 2024-06-27 PROCEDURE — 85025 COMPLETE CBC W/AUTO DIFF WBC: CPT | Performed by: EMERGENCY MEDICINE

## 2024-06-27 PROCEDURE — 51798 US URINE CAPACITY MEASURE: CPT

## 2024-06-27 RX ORDER — POLYETHYLENE GLYCOL 3350 17 G/17G
17 POWDER, FOR SOLUTION ORAL 2 TIMES DAILY
Qty: 6 PACKET | Refills: 0 | Status: ON HOLD | OUTPATIENT
Start: 2024-06-27 | End: 2024-06-30

## 2024-06-27 RX ORDER — POLYETHYLENE GLYCOL 3350 17 G/17G
17 POWDER, FOR SOLUTION ORAL 2 TIMES DAILY
Qty: 6 PACKET | Refills: 0 | Status: SHIPPED | OUTPATIENT
Start: 2024-06-27 | End: 2024-06-27

## 2024-06-27 RX ORDER — LEVOFLOXACIN 750 MG/1
750 TABLET, FILM COATED ORAL DAILY
Qty: 7 TABLET | Refills: 0 | Status: SHIPPED | OUTPATIENT
Start: 2024-06-27 | End: 2024-06-27

## 2024-06-27 RX ORDER — LEVOFLOXACIN 750 MG/1
750 TABLET, FILM COATED ORAL DAILY
Qty: 7 TABLET | Refills: 0 | Status: SHIPPED | OUTPATIENT
Start: 2024-06-27 | End: 2024-07-03 | Stop reason: HOSPADM

## 2024-06-27 RX ORDER — SODIUM CHLORIDE 0.9 % (FLUSH) 0.9 %
10 SYRINGE (ML) INJECTION AS NEEDED
Status: DISCONTINUED | OUTPATIENT
Start: 2024-06-27 | End: 2024-06-27 | Stop reason: HOSPADM

## 2024-06-27 RX ADMIN — SODIUM CHLORIDE, POTASSIUM CHLORIDE, SODIUM LACTATE AND CALCIUM CHLORIDE 500 ML: 600; 310; 30; 20 INJECTION, SOLUTION INTRAVENOUS at 06:15

## 2024-06-27 NOTE — ED PROVIDER NOTES
"Subjective   History of Present Illness    Review of Systems    Past Medical History:   Diagnosis Date    Arthritis     Autonomic disease     CAD (coronary artery disease) 02/06/2017    Cervical radiculopathy 09/16/2021    Chronic constipation with acute exaccerbation 05/10/2021    Coronary artery disease     Degeneration of cervical intervertebral disc 08/11/2021    Diabetes mellitus     Diabetic foot ulcer 08/31/2020    Diabetic polyneuropathy associated with type 2 diabetes mellitus 01/18/2021    Elevated cholesterol     Gastroesophageal reflux disease 05/13/2019    Headache     HTN (hypertension), benign 05/03/2017    Hyperlipidemia     Hypertension     Mixed hyperlipidemia 02/07/2017    Multiple lung nodules 01/26/2020    5mm, 9 mm RLL identified 1/2020, not present 10/2019.    Myocardial infarction     Osteomyelitis 01/22/2020    Osteomyelitis of fifth toe of right foot 10/07/2019    Pancreatitis     Persistent insomnia 01/20/2020    Renal disorder     Sleep apnea 02/06/2017    Sleep apnea with use of continuous positive airway pressure (CPAP)     NON-COMPLIANT    Slow transit constipation 01/16/2019    Spinal stenosis in cervical region 09/16/2021    Vitamin D deficiency 03/02/2021       Allergies   Allergen Reactions    Cefepime Hives and Anaphylaxis    Bactrim [Sulfamethoxazole-Trimethoprim] Other (See Comments)     \"RENAL FAILURE\"    Vancomycin Itching    Zolpidem Mental Status Change     \"makes him crazy\"    Metronidazole Rash       Past Surgical History:   Procedure Laterality Date    ABDOMINAL SURGERY      AMPUTATION FOOT / TOE Left 10/2021    5th digit     ANTERIOR CERVICAL DISCECTOMY W/ FUSION N/A 08/05/2022    Procedure: CERVICAL DISCECTOMY ANTERIOR WITH FUSION C5-6 with possible plating of C5-7 with neuromonitoring and 1 c-arm;  Surgeon: Karel Soliz MD;  Location: [facility] OR;  Service: Neurosurgery;  Laterality: N/A;    APPENDECTOMY      BACK SURGERY      CARDIAC CATHETERIZATION Left 02/08/2021 "    Procedure: Left Heart Cath w poss intervention left anatomical snuff box aceflora;  Surgeon: Omkar Charles DO;  Location:  [facility] CATH INVASIVE LOCATION;  Service: Cardiology;  Laterality: Left;    CARDIAC SURGERY      CATARACT EXTRACTION      CERVICAL SPINE SURGERY      COLONOSCOPY N/A 2017    Normal exam repeat in 5 years    COLONOSCOPY N/A 2019    Mild acute inflammation    COLONOSCOPY N/A 2024    2 areas at 10 and 20 cm with friability ulceration 2 clips placed at 20 cm and 4 clips at 10 cm poor prep normal mucosa,mild eroisions and ulcerations in visible vessels    COLONOSCOPY W/ POLYPECTOMY  2014    Hyperplastic polyp    CORONARY ARTERY BYPASS GRAFT  10/2015    ENDOSCOPY  2011    Gastritis with hemorrhage    ENDOSCOPY N/A 2017    Normal exam    ENDOSCOPY N/A 2019    Gastritis    ENDOSCOPY N/A 2020    Non-erosive gastritis with hemorrhage    ENDOSCOPY N/A 02/10/2021    Esophagitis    ENDOSCOPY N/A 2024    There were esophageal mucosal changes suspicious for short-segment Low's esophagus present in the distal esophagus. The maximum longitudinal extent of these mucosal changes was 2 cm in length. Mucosa was biopsied with a cold forceps for histologyDistal esophagus, biopsies: Mild chronic active esophagogastritis. No evidence of intestinal metaplasia, dysplasia. Antrum, bx, Mild chronic gastritis    FOOT SURGERY Left     INCISION AND DRAINAGE OF WOUND Left 2007    spider bite       Family History   Problem Relation Age of Onset    Colon cancer Father     Heart disease Father     Colon cancer Sister     Colon polyps Sister     Alzheimer's disease Mother     Coronary artery disease Sister     Coronary artery disease Sister        Social History     Socioeconomic History    Marital status:    Tobacco Use    Smoking status: Former     Current packs/day: 0.00     Types: Cigarettes     Quit date:      Years since quittin.5     Smokeless tobacco: Never    Tobacco comments:     [place] [employer]   Vaping Use    Vaping status: Never Used   Substance and Sexual Activity    Alcohol use: No    Drug use: No    Sexual activity: Defer           Objective   Physical Exam    Procedures           ED Course  ED Course as of 06/27/24 1035   Thu Jun 27, 2024   0730 69-year-old male presents to the ED with complaint of abdominal pain, constipation, urinary urgency and frequency.  UA reveals too numerous to count WBCs and 4+ bacteria, nitrites positive.  Sensitive to Bactrim per previous urine culture.  CT abdomen and pelvis ordered and pending.  Signing for patient care to Dr. Pascual will follow-up on CT and disposition accordingly. [AW]   1031 Please refer to Dr. De Jesus reports for further details the patient came in some nonstress abdominal pain hemodynamically stable oxygen saturation within normal limits 4+ bacteria in the urine with no leukocytosis.  Baseline kidney function no anion gap.  CT scan was obtained which not show any acute abnormity pathology to explain abdominal discomfort.  The patient does complain of constipation.  There is no evidence radiologically of severe constipation.  Will put on antibiotics MiraLAX and have him discharged home with a follow-up with the primary MD and return there for any worsening symptoms. [TS]   1031 Patient has some pleural effusion on the CT scan but clinically not hypoxic and no clinical evidence of shortness of breath. [TS]      ED Course User Index  [AW] Steve De Jesus MD  [TS] Faisal Pascual MD                                             Medical Decision Making  Patient came into the ED with abdominal pain.  There is no clinical evidence of any acute abdominal pathology.  Patient got a UTI with no anion gap no clinical evidence of sepsis.  Has some mild constipation for which she was given MiraLAX we will put him on Levaquin since he is allergic to multiple medications.  I have him  follow-up with the primary MD he has tolerated Cipro in the past.  Patient be advised return there for any worsening symptoms.    Problems Addressed:  Abdominal pain, unspecified abdominal location: acute illness or injury  Constipation, unspecified constipation type: acute illness or injury  Hyperglycemia: acute illness or injury  Urinary tract infection without hematuria, site unspecified: acute illness or injury    Amount and/or Complexity of Data Reviewed  Labs: ordered.     Details: Labs reviewed  Radiology: ordered.    Risk  Prescription drug management.  Risk Details: This patient presents with abdominal pain arrived hemodynamically stable.  Presentation not consistent with other acute, emergent causes of abdominal pain at this time.  Low suspicion for dissection there is no widened mediastinum, hypotension, pulses deficits, and no pain tearing through to the back.  Low suspicion for nephrolithiasis without flank pain radiating to the groin, hematuria, or any history of kidney stones.  Low suspicion for viscus perforation without evidence of guarding, rebound, or rigidity that would be concerning for an acute abdomen.  Low concern for appendicitis as pain did not radiate from the umbilicus to the right lower quadrant, negative Rovsing's sign, no severe constipation on exam.  Low concern for cholecystitis with negative Sagastume sign, no history of gallstones, and no recent pale stools or pain after eating.  Low concern for ulcerative disease as pain is not consistent with eating  foods and is not relieved with patient refraining from eatingand there is no hematemesis or melena. Low suspicion for GI bleeding as there is no melena hematemesis or hematochezia and patient's hemodynamics are stable and hemoglobin is at its baseline.  Low suspicion for gastroenteritis without evidence of diarrhea, fevers, or recent uncooked food exposure.  There is low concern for ischemic bowel with no significant abdominal pain  normal vitals and no acidosis no history of peripheral vascular disease or cardiac dysrhythmias.                Final diagnoses:   Abdominal pain, unspecified abdominal location   Constipation, unspecified constipation type   Urinary tract infection without hematuria, site unspecified   Hyperglycemia       ED Disposition  ED Disposition       ED Disposition   Discharge    Condition   Stable    Comment   --               Del Shetty MD  [address] [facility] [id number] [phone]    In 2 days           Medication List        New Prescriptions      levoFLOXacin 750 MG tablet  Commonly known as: LEVAQUIN  Take 1 tablet by mouth Daily.            Changed      bumetanide 2 MG tablet  Commonly known as: BUMEX  Take 1 tablet by mouth Daily.  What changed: when to take this     * polyethylene glycol 17 g packet  Commonly known as: MIRALAX  Take 17 g by mouth Daily. Obtain OTC  What changed: Another medication with the same name was added. Make sure you understand how and when to take each.     * polyethylene glycol 17 g packet  Commonly known as: MIRALAX  Take 17 g by mouth 2 (Two) Times a Day for 3 days.  What changed: You were already taking a medication with the same name, and this prescription was added. Make sure you understand how and when to take each.           * This list has 2 medication(s) that are the same as other medications prescribed for you. Read the directions carefully, and ask your doctor or other care provider to review them with you.                Stop      ciprofloxacin 500 MG tablet  Commonly known as: CIPRO               Where to Get Your Medications        These medications were sent to [facility]/pharmacy #[id number] - [place], [place] - [address] - [phone]  - [phone] FX  [address] [facility] [address]      Phone: [phone]   levoFLOXacin 750 MG tablet  polyethylene glycol 17 g packet            Faisal Pascual MD  06/27/24 5559

## 2024-06-27 NOTE — ED PROVIDER NOTES
"Subjective   History of Present Illness  69-year-old male.  Presents to the ED with complaint of abdominal pain, dysuria, difficulty urinating.  History of hypertension, hyperlipidemia, diabetes, coronary disease.  Reports nonspecific abdominal pain for the past few months.  He is scheduled to see GI next week.  He reports worsening generalized abdominal pain over the past few days.  Reports associated nausea but no vomiting.  Belly pain is associate with constipation, as only had small amount of stool over the past 2 weeks.  He also complains of suprapubic discomfort and urinary urgency and frequency.  States he feels like he has to \"strain\" to urinate.  No reports of fever, chills, cough, congestion, sore throat or rhinorrhea.      History provided by:  Patient      Review of Systems   All other systems reviewed and are negative.      Past Medical History:   Diagnosis Date    Arthritis     Autonomic disease     CAD (coronary artery disease) 02/06/2017    Cervical radiculopathy 09/16/2021    Chronic constipation with acute exaccerbation 05/10/2021    Coronary artery disease     Degeneration of cervical intervertebral disc 08/11/2021    Diabetes mellitus     Diabetic foot ulcer 08/31/2020    Diabetic polyneuropathy associated with type 2 diabetes mellitus 01/18/2021    Elevated cholesterol     Gastroesophageal reflux disease 05/13/2019    Headache     HTN (hypertension), benign 05/03/2017    Hyperlipidemia     Hypertension     Mixed hyperlipidemia 02/07/2017    Multiple lung nodules 01/26/2020    5mm, 9 mm RLL identified 1/2020, not present 10/2019.    Myocardial infarction     Osteomyelitis 01/22/2020    Osteomyelitis of fifth toe of right foot 10/07/2019    Pancreatitis     Persistent insomnia 01/20/2020    Renal disorder     Sleep apnea 02/06/2017    Sleep apnea with use of continuous positive airway pressure (CPAP)     NON-COMPLIANT    Slow transit constipation 01/16/2019    Spinal stenosis in cervical region " "09/16/2021    Vitamin D deficiency 03/02/2021       Allergies   Allergen Reactions    Cefepime Hives and Anaphylaxis    Bactrim [Sulfamethoxazole-Trimethoprim] Other (See Comments)     \"RENAL FAILURE\"    Vancomycin Itching    Zolpidem Mental Status Change     \"makes him crazy\"    Metronidazole Rash       Past Surgical History:   Procedure Laterality Date    ABDOMINAL SURGERY      AMPUTATION FOOT / TOE Left 10/2021    5th digit     ANTERIOR CERVICAL DISCECTOMY W/ FUSION N/A 08/05/2022    Procedure: CERVICAL DISCECTOMY ANTERIOR WITH FUSION C5-6 with possible plating of C5-7 with neuromonitoring and 1 c-arm;  Surgeon: Karel Soliz MD;  Location:  [facility] OR;  Service: Neurosurgery;  Laterality: N/A;    APPENDECTOMY      BACK SURGERY      CARDIAC CATHETERIZATION Left 02/08/2021    Procedure: Left Heart Cath w poss intervention left anatomical snuff box acess;  Surgeon: Omkar Charles DO;  Location:  [facility] CATH INVAS[facility] LOCATION;  Service: Cardiology;  Laterality: Left;    CARDIAC SURGERY      CATARACT EXTRACTION      CERVICAL SPINE SURGERY      COLONOSCOPY N/A 01/31/2017    Normal exam repeat in 5 years    COLONOSCOPY N/A 02/11/2019    Mild acute inflammation    COLONOSCOPY N/A 04/07/2024    2 areas at 10 and 20 cm with friability ulceration 2 clips placed at 20 cm and 4 clips at 10 cm poor prep normal mucosa,mild eroisions and ulcerations in visible vessels    COLONOSCOPY W/ POLYPECTOMY  03/04/2014    Hyperplastic polyp    CORONARY ARTERY BYPASS GRAFT  10/2015    ENDOSCOPY  04/13/2011    Gastritis with hemorrhage    ENDOSCOPY N/A 05/05/2017    Normal exam    ENDOSCOPY N/A 02/11/2019    Gastritis    ENDOSCOPY N/A 09/01/2020    Non-erosive gastritis with hemorrhage    ENDOSCOPY N/A 02/10/2021    Esophagitis    ENDOSCOPY N/A 04/11/2024    There were esophageal mucosal changes suspicious for short-segment Low's esophagus present in the distal esophagus. The maximum longitudinal extent of these mucosal " changes was 2 cm in length. Mucosa was biopsied with a cold forceps for histologyDistal esophagus, biopsies: Mild chronic active esophagogastritis. No evidence of intestinal metaplasia, dysplasia. Antrum, bx, Mild chronic gastritis    FOOT SURGERY Left     INCISION AND DRAINAGE OF WOUND Left 2007    spider bite       Family History   Problem Relation Age of Onset    Colon cancer Father     Heart disease Father     Colon cancer Sister     Colon polyps Sister     Alzheimer's disease Mother     Coronary artery disease Sister     Coronary artery disease Sister        Social History     Socioeconomic History    Marital status:    Tobacco Use    Smoking status: Former     Current packs/day: 0.00     Types: Cigarettes     Quit date:      Years since quittin.5    Smokeless tobacco: Never    Tobacco comments:     [place]oked in [employer]   Vaping Use    Vaping status: Never Used   Substance and Sexual Activity    Alcohol use: No    Drug use: No    Sexual activity: Defer           Objective   Physical Exam  Vitals and nursing note reviewed.   Constitutional:       Appearance: He is well-developed. He is obese.   HENT:      Head: Normocephalic and atraumatic.      Nose: Nose normal. No congestion or rhinorrhea.      Mouth/Throat:      Mouth: Mucous membranes are moist.   Cardiovascular:      Rate and Rhythm: Normal rate and regular rhythm.      Heart sounds: Normal heart sounds. No murmur heard.  Pulmonary:      Effort: Pulmonary effort is normal.      Breath sounds: Normal breath sounds. No wheezing, rhonchi or rales.   Abdominal:      General: Abdomen is flat. Bowel sounds are normal.      Palpations: Abdomen is soft.      Tenderness: There is abdominal tenderness.      Comments: Generalized abdominal tenderness   Genitourinary:     Comments: No fecal impaction  Skin:     General: Skin is warm and dry.      Capillary Refill: Capillary refill takes less than 2 seconds.   Neurological:      General: No focal  deficit present.      Mental Status: He is alert and oriented to person, place, and time. Mental status is at baseline.         Procedures       Lab Results (last 24 hours)       ** No results found for the last 24 hours. **         CT Abdomen Pelvis Stone Protocol    Result Date: 6/27/2024  EXAMINATION: CT ABDOMEN PELVIS STONE PROTOCOL-  6/27/2024 8:58 AM  HISTORY: abd pain; R10.9-Unspecified abdominal pain; K59.00-Constipation, unspecified; N39.0-Urinary tract infection, site not specified difficulty with urination and defecation for the last 2 weeks  COMPARISON: 5/20/2024  DLP: 1691.36 mGy.cm  In order to have a CT radiation dose as low as reasonably achievable, Automated Exposure Control was utilized for adjustment of the mA and/or KV according to patient size.  TECHNIQUE: Noncontrast images of the abdomen and pelvis obtained without oral contrast. Sagittal and coronal reformatted images were obtained and reviewed as well.  FINDINGS: This examination is somewhat limited by the lack of intravenous contrast.  Moderate bilateral pleural effusions noted. Mild cardiomegaly with extensive calcified atherosclerotic vascular disease in the coronary arteries. Dependent changes in both lung bases are suspected.  Artifact from the patient's arms overlies the liver. I don't see any definite acute abnormality in the liver. The gallbladder is surgically absent. Fatty infiltration of the pancreas without a focal lesion or definite ductal dilation. The spleen and adrenal glands are without acute findings. Mild perinephric fat stranding adjacent to both kidneys. RIGHT renal cyst redemonstrated. No hydronephrosis or hydroureter.  Atherosclerotic vascular disease in the aorta. No definite retroperitoneal mass. Some small retroperitoneal lymph nodes are unchanged. No retroperitoneal hemorrhage identified.  There is stool in the rectum. Surgical clips are also noted in the upper rectum, likely from previous biopsy. Moderate stool in  the colon. Proximal aspect of the appendix is normal in appearance. The distal appendix is not as well seen but I don't see any evidence of appendicitis. No evidence of a bowel obstruction. No definite gastric abnormality.  No free fluid in the abdomen or pelvis. No definite mesenteric mass or adenopathy identified. Surrounding soft tissues demonstrates some fat stranding along the anterior abdominal wall, unchanged from 5/20/2024. Mild diffuse body wall edema is noted. Small fat-containing RIGHT inguinal hernia.  No definite pelvic mass or adenopathy. No free or loculated fluid collection either. The urinary bladder is partially distended with urine. No hydroureter.  Median sternotomy wires are noted. No acute findings in the osseous structures. Multilevel degenerative disc disease noted. No destructive bone lesion identified. Bridging endplate osteophytes in the lower thoracic spine.        1.  Moderate bilateral pleural effusions and cardiomegaly. Dependent opacities in both lung bases are most likely atelectatic but nonspecific.  2.  No definite acute findings in the abdomen or pelvis.  3.  Metallic clip again seen in the rectum, possibly from previous biopsy.  4.  There is mild fecal stasis.  This report was signed and finalized on 6/27/2024 9:06 AM by Dr. Steven French MD.        ED Course  ED Course as of 06/28/24 0704   Thu Jun 27, 2024   0730 69-year-old male presents to the ED with complaint of abdominal pain, constipation, urinary urgency and frequency.  UA reveals too numerous to count WBCs and 4+ bacteria, nitrites positive.  Sensitive to Bactrim per previous urine culture.  CT abdomen and pelvis ordered and pending.  Signing for patient care to Dr. Pascual will follow-up on CT and disposition accordingly. [AW]   1031 Please refer to Dr. De Jesus reports for further details the patient came in some nonstress abdominal pain hemodynamically stable oxygen saturation within normal limits 4+ bacteria in the  urine with no leukocytosis.  Baseline kidney function no anion gap.  CT scan was obtained which not show any acute abnormity pathology to explain abdominal discomfort.  The patient does complain of constipation.  There is no evidence radiologically of severe constipation.  Will put on antibiotics MiraLAX and have him discharged home with a follow-up with the primary MD and return there for any worsening symptoms. [TS]   1031 Patient has some pleural effusion on the CT scan but clinically not hypoxic and no clinical evidence of shortness of breath. [TS]      ED Course User Index  [AW] Steve De Jesus MD  [TS] Faisal Pascual MD                                             Medical Decision Making  Problems Addressed:  Abdominal pain, unspecified abdominal location: complicated acute illness or injury  Constipation, unspecified constipation type: complicated acute illness or injury  Hyperglycemia: complicated acute illness or injury  Urinary tract infection without hematuria, site unspecified: complicated acute illness or injury    Amount and/or Complexity of Data Reviewed  Labs: ordered.  Radiology: ordered.    Risk  Prescription drug management.        Final diagnoses:   Abdominal pain, unspecified abdominal location   Constipation, unspecified constipation type   Urinary tract infection without hematuria, site unspecified   Hyperglycemia       ED Disposition  ED Disposition       ED Disposition   Discharge    Condition   Stable    Comment   --               Del Shetty MD  [address] [id number]  [phone]    In 2 days           Medication List        New Prescriptions      levoFLOXacin 750 MG tablet  Commonly known as: LEVAQUIN  Take 1 tablet by mouth Daily.            Changed      bumetanide 2 MG tablet  Commonly known as: BUMEX  Take 1 tablet by mouth Daily.  What changed: when to take this     * polyethylene glycol 17 g packet  Commonly known as: MIRALAX  Take 17 g by mouth Daily.  Obtain OTC  What changed: Another medication with the same name was added. Make sure you understand how and when to take each.     * polyethylene glycol 17 g packet  Commonly known as: MIRALAX  Take 17 g by mouth 2 (Two) Times a Day for 3 days.  What changed: You were already taking a medication with the same name, and this prescription was added. Make sure you understand how and when to take each.           * This list has 2 medication(s) that are the same as other medications prescribed for you. Read the directions carefully, and ask your doctor or other care provider to review them with you.                Stop      ciprofloxacin 500 MG tablet  Commonly known as: CIPRO               Where to Get Your Medications        These medications were sent to [facility] - [place] [address]      Hours: Monday to Friday 7 AM to 5 PM (Closed 12:30 PM to 1 PM) Phone: [phone]   levoFLOXacin 750 MG tablet  polyethylene glycol 17 g packet            Steve De Jesus MD  06/27/24 0736       Steve De Jesus MD  06/28/24 0704

## 2024-06-27 NOTE — DISCHARGE INSTRUCTIONS
It was very nice to meet you, Erick. Thank you for allowing us to take care of you today at [facility].     Today you were seen in the emergency department for your symptoms. Please understand that an ER evaluation is just the start of your evaluation. We do the best we can, but we are often unable to fully find what is causing your symptoms from one evaluation.  Because of this, the goal is to determine whether you need to be evaluated in the hospital or if it is safe for you to go home and see other doctors provided such as primary care physicians or specialist on an outpatient basis.      Like we discussed, I strongly urge that you follow up with your primary care doctor. Please call their office to set up an appointment as soon as possible so that you can be re-evaluated for improvement in your symptoms or for any other questions.  I have provided the information needed, including phone number, to call to set up an appointment below in these discharge papers.      Educational material has also been provided in the following pages regarding what we have discussed today.  Please make sure that you take the antibiotics and keep a blood sugar diary and have a follow-up with the primary MD for reevaluation reassessment.     Please return to the emergency room within 12-48 hours if you experience symptoms such as the following:   Fever, chills, chest pain or shortness of breath, pain with inspiration/expiration, pain that travels to your arms, neck or back, nausea, vomiting, severe headache, tearing pain in your chest, dizziness, feel as though you are about to pass out, OR if you have any worsening symptoms, or any other concerns.    Risks and benefits of treatments given and alternative treatment options discussed with patient/family. I answered all the questions in simple, plain language, and there was voiced understanding and agreement with plan of care. There were no further questions. Differential  diagnosis discussed. Patient/family was advised that the practice of medicine is not always an exact science, and sometimes tests, physical exam, or history may not show the underlying conditions with certainty. Additionally, the condition may change or show itself later after initial presentation. There was also expressed understanding and agreement with this limitation of emergency medicine practice. Patient/family was asked to return to ED if any problem or issues or if condition worsens or does not improved. Patient/family agreed to follow up with PCP/specialist as advised, or return to ED if unable to see a provider in a timely fashion for continued symptoms.

## 2024-06-28 ENCOUNTER — HOSPITAL ENCOUNTER (OUTPATIENT)
Facility: HOSPITAL | Age: 69
Discharge: HOME | End: 2024-07-03
Attending: STUDENT IN AN ORGANIZED HEALTH CARE EDUCATION/TRAINING PROGRAM | Admitting: INTERNAL MEDICINE
Payer: COMMERCIAL

## 2024-06-28 ENCOUNTER — APPOINTMENT (OUTPATIENT)
Dept: GENERAL RADIOLOGY | Facility: HOSPITAL | Age: 69
End: 2024-06-28
Payer: COMMERCIAL

## 2024-06-28 DIAGNOSIS — K62.5 RECTAL BLEEDING: ICD-10-CM

## 2024-06-28 DIAGNOSIS — K59.01 SLOW TRANSIT CONSTIPATION: ICD-10-CM

## 2024-06-28 DIAGNOSIS — R19.7 DIARRHEA, UNSPECIFIED TYPE: ICD-10-CM

## 2024-06-28 DIAGNOSIS — Z74.09 IMPAIRED MOBILITY: ICD-10-CM

## 2024-06-28 DIAGNOSIS — R07.9 CHEST PAIN WITH HIGH RISK FOR CARDIAC ETIOLOGY: Primary | ICD-10-CM

## 2024-06-28 PROBLEM — I50.9 CHF EXACERBATION: Status: ACTIVE | Noted: 2024-06-28

## 2024-06-28 PROBLEM — I50.43 CHF (CONGESTIVE HEART FAILURE), NYHA CLASS I, ACUTE ON CHRONIC, COMBINED: Status: ACTIVE | Noted: 2024-06-28

## 2024-06-28 LAB
ALBUMIN SERPL-MCNC: 3.8 G/DL (ref 3.5–5.2)
ALBUMIN/GLOB SERPL: 1.4 G/DL
ALP SERPL-CCNC: 108 U/L (ref 39–117)
ALT SERPL W P-5'-P-CCNC: 9 U/L (ref 1–41)
ANION GAP SERPL CALCULATED.3IONS-SCNC: 13 MMOL/L (ref 5–15)
AST SERPL-CCNC: 14 U/L (ref 1–40)
BASOPHILS # BLD AUTO: 0.05 10*3/MM3 (ref 0–0.2)
BASOPHILS NFR BLD AUTO: 0.5 % (ref 0–1.5)
BILIRUB SERPL-MCNC: 0.3 MG/DL (ref 0–1.2)
BUN SERPL-MCNC: 32 MG/DL (ref 8–23)
BUN/CREAT SERPL: 15.9 (ref 7–25)
CALCIUM SPEC-SCNC: 9.1 MG/DL (ref 8.6–10.5)
CHLORIDE SERPL-SCNC: 104 MMOL/L (ref 98–107)
CO2 SERPL-SCNC: 23 MMOL/L (ref 22–29)
CREAT SERPL-MCNC: 2.01 MG/DL (ref 0.76–1.27)
DEPRECATED RDW RBC AUTO: 46.5 FL (ref 37–54)
EGFRCR SERPLBLD CKD-EPI 2021: 35.5 ML/MIN/1.73
EOSINOPHIL # BLD AUTO: 0.38 10*3/MM3 (ref 0–0.4)
EOSINOPHIL NFR BLD AUTO: 3.7 % (ref 0.3–6.2)
ERYTHROCYTE [DISTWIDTH] IN BLOOD BY AUTOMATED COUNT: 14.6 % (ref 12.3–15.4)
GLOBULIN UR ELPH-MCNC: 2.8 GM/DL
GLUCOSE SERPL-MCNC: 177 MG/DL (ref 65–99)
HCT VFR BLD AUTO: 33.5 % (ref 37.5–51)
HGB BLD-MCNC: 10.5 G/DL (ref 13–17.7)
HOLD SPECIMEN: NORMAL
IMM GRANULOCYTES # BLD AUTO: 0.04 10*3/MM3 (ref 0–0.05)
IMM GRANULOCYTES NFR BLD AUTO: 0.4 % (ref 0–0.5)
LYMPHOCYTES # BLD AUTO: 1.55 10*3/MM3 (ref 0.7–3.1)
LYMPHOCYTES NFR BLD AUTO: 15.2 % (ref 19.6–45.3)
MCH RBC QN AUTO: 27.1 PG (ref 26.6–33)
MCHC RBC AUTO-ENTMCNC: 31.3 G/DL (ref 31.5–35.7)
MCV RBC AUTO: 86.6 FL (ref 79–97)
MONOCYTES # BLD AUTO: 0.88 10*3/MM3 (ref 0.1–0.9)
MONOCYTES NFR BLD AUTO: 8.6 % (ref 5–12)
NEUTROPHILS NFR BLD AUTO: 7.28 10*3/MM3 (ref 1.7–7)
NEUTROPHILS NFR BLD AUTO: 71.6 % (ref 42.7–76)
NRBC BLD AUTO-RTO: 0 /100 WBC (ref 0–0.2)
NT-PROBNP SERPL-MCNC: 3206 PG/ML (ref 0–900)
PLATELET # BLD AUTO: 252 10*3/MM3 (ref 140–450)
PMV BLD AUTO: 12 FL (ref 6–12)
POTASSIUM SERPL-SCNC: 4.2 MMOL/L (ref 3.5–5.2)
PROT SERPL-MCNC: 6.6 G/DL (ref 6–8.5)
RBC # BLD AUTO: 3.87 10*6/MM3 (ref 4.14–5.8)
SODIUM SERPL-SCNC: 140 MMOL/L (ref 136–145)
TROPONIN T SERPL HS-MCNC: 47 NG/L
TROPONIN T SERPL HS-MCNC: 51 NG/L
WBC NRBC COR # BLD AUTO: 10.18 10*3/MM3 (ref 3.4–10.8)
WHOLE BLOOD HOLD COAG: NORMAL
WHOLE BLOOD HOLD SPECIMEN: NORMAL

## 2024-06-28 PROCEDURE — G0378 HOSPITAL OBSERVATION PER HR: HCPCS

## 2024-06-28 PROCEDURE — 80053 COMPREHEN METABOLIC PANEL: CPT

## 2024-06-28 PROCEDURE — 83880 ASSAY OF NATRIURETIC PEPTIDE: CPT

## 2024-06-28 PROCEDURE — 85025 COMPLETE CBC W/AUTO DIFF WBC: CPT

## 2024-06-28 PROCEDURE — 93005 ELECTROCARDIOGRAM TRACING: CPT

## 2024-06-28 PROCEDURE — 36415 COLL VENOUS BLD VENIPUNCTURE: CPT

## 2024-06-28 PROCEDURE — 93010 ELECTROCARDIOGRAM REPORT: CPT | Performed by: STUDENT IN AN ORGANIZED HEALTH CARE EDUCATION/TRAINING PROGRAM

## 2024-06-28 PROCEDURE — 71045 X-RAY EXAM CHEST 1 VIEW: CPT

## 2024-06-28 PROCEDURE — 84484 ASSAY OF TROPONIN QUANT: CPT

## 2024-06-28 PROCEDURE — 84484 ASSAY OF TROPONIN QUANT: CPT | Performed by: STUDENT IN AN ORGANIZED HEALTH CARE EDUCATION/TRAINING PROGRAM

## 2024-06-28 PROCEDURE — 93005 ELECTROCARDIOGRAM TRACING: CPT | Performed by: STUDENT IN AN ORGANIZED HEALTH CARE EDUCATION/TRAINING PROGRAM

## 2024-06-28 PROCEDURE — 99285 EMERGENCY DEPT VISIT HI MDM: CPT

## 2024-06-28 RX ORDER — BISACODYL 5 MG/1
5 TABLET, DELAYED RELEASE ORAL DAILY PRN
Status: DISCONTINUED | OUTPATIENT
Start: 2024-06-28 | End: 2024-07-03 | Stop reason: HOSPADM

## 2024-06-28 RX ORDER — SODIUM CHLORIDE 0.9 % (FLUSH) 0.9 %
10 SYRINGE (ML) INJECTION AS NEEDED
Status: DISCONTINUED | OUTPATIENT
Start: 2024-06-28 | End: 2024-07-03 | Stop reason: HOSPADM

## 2024-06-28 RX ORDER — ACETAMINOPHEN 160 MG/5ML
650 SOLUTION ORAL EVERY 4 HOURS PRN
Status: DISCONTINUED | OUTPATIENT
Start: 2024-06-28 | End: 2024-07-03 | Stop reason: HOSPADM

## 2024-06-28 RX ORDER — INSULIN LISPRO 100 [IU]/ML
2-7 INJECTION, SOLUTION INTRAVENOUS; SUBCUTANEOUS
Status: DISCONTINUED | OUTPATIENT
Start: 2024-06-29 | End: 2024-07-03 | Stop reason: HOSPADM

## 2024-06-28 RX ORDER — NITROGLYCERIN 0.4 MG/1
0.4 TABLET SUBLINGUAL
Status: DISCONTINUED | OUTPATIENT
Start: 2024-06-28 | End: 2024-07-03 | Stop reason: HOSPADM

## 2024-06-28 RX ORDER — NITROGLYCERIN 0.4 MG/1
0.4 TABLET SUBLINGUAL
Status: DISCONTINUED | OUTPATIENT
Start: 2024-06-28 | End: 2024-06-28

## 2024-06-28 RX ORDER — SODIUM CHLORIDE 9 MG/ML
40 INJECTION, SOLUTION INTRAVENOUS AS NEEDED
Status: DISCONTINUED | OUTPATIENT
Start: 2024-06-28 | End: 2024-07-03 | Stop reason: HOSPADM

## 2024-06-28 RX ORDER — FUROSEMIDE 10 MG/ML
20 INJECTION INTRAMUSCULAR; INTRAVENOUS EVERY 12 HOURS SCHEDULED
Status: DISCONTINUED | OUTPATIENT
Start: 2024-06-29 | End: 2024-06-29

## 2024-06-28 RX ORDER — NICOTINE POLACRILEX 4 MG
15 LOZENGE BUCCAL
Status: DISCONTINUED | OUTPATIENT
Start: 2024-06-28 | End: 2024-07-03 | Stop reason: HOSPADM

## 2024-06-28 RX ORDER — POLYETHYLENE GLYCOL 3350 17 G/17G
17 POWDER, FOR SOLUTION ORAL DAILY PRN
Status: DISCONTINUED | OUTPATIENT
Start: 2024-06-28 | End: 2024-07-03 | Stop reason: HOSPADM

## 2024-06-28 RX ORDER — SODIUM CHLORIDE 0.9 % (FLUSH) 0.9 %
10 SYRINGE (ML) INJECTION AS NEEDED
Status: DISCONTINUED | OUTPATIENT
Start: 2024-06-28 | End: 2024-06-29 | Stop reason: SDUPTHER

## 2024-06-28 RX ORDER — BISACODYL 10 MG
10 SUPPOSITORY, RECTAL RECTAL DAILY PRN
Status: DISCONTINUED | OUTPATIENT
Start: 2024-06-28 | End: 2024-07-03 | Stop reason: HOSPADM

## 2024-06-28 RX ORDER — DEXTROSE MONOHYDRATE 25 G/50ML
25 INJECTION, SOLUTION INTRAVENOUS
Status: DISCONTINUED | OUTPATIENT
Start: 2024-06-28 | End: 2024-07-03 | Stop reason: HOSPADM

## 2024-06-28 RX ORDER — SODIUM CHLORIDE 0.9 % (FLUSH) 0.9 %
10 SYRINGE (ML) INJECTION EVERY 12 HOURS SCHEDULED
Status: DISCONTINUED | OUTPATIENT
Start: 2024-06-28 | End: 2024-07-03 | Stop reason: HOSPADM

## 2024-06-28 RX ORDER — ACETAMINOPHEN 650 MG/1
650 SUPPOSITORY RECTAL EVERY 4 HOURS PRN
Status: DISCONTINUED | OUTPATIENT
Start: 2024-06-28 | End: 2024-07-03 | Stop reason: HOSPADM

## 2024-06-28 RX ORDER — ONDANSETRON 2 MG/ML
4 INJECTION INTRAMUSCULAR; INTRAVENOUS EVERY 6 HOURS PRN
Status: DISCONTINUED | OUTPATIENT
Start: 2024-06-28 | End: 2024-07-03 | Stop reason: HOSPADM

## 2024-06-28 RX ORDER — AMOXICILLIN 250 MG
2 CAPSULE ORAL 2 TIMES DAILY PRN
Status: DISCONTINUED | OUTPATIENT
Start: 2024-06-28 | End: 2024-07-03 | Stop reason: HOSPADM

## 2024-06-28 RX ORDER — FUROSEMIDE 10 MG/ML
80 INJECTION INTRAMUSCULAR; INTRAVENOUS ONCE
Status: COMPLETED | OUTPATIENT
Start: 2024-06-28 | End: 2024-06-29

## 2024-06-28 RX ORDER — PANTOPRAZOLE SODIUM 40 MG/10ML
80 INJECTION, POWDER, LYOPHILIZED, FOR SOLUTION INTRAVENOUS ONCE
Status: COMPLETED | OUTPATIENT
Start: 2024-06-29 | End: 2024-06-29

## 2024-06-28 RX ORDER — ACETAMINOPHEN 325 MG/1
650 TABLET ORAL EVERY 4 HOURS PRN
Status: DISCONTINUED | OUTPATIENT
Start: 2024-06-28 | End: 2024-07-03 | Stop reason: HOSPADM

## 2024-06-28 RX ORDER — IBUPROFEN 600 MG/1
1 TABLET ORAL
Status: DISCONTINUED | OUTPATIENT
Start: 2024-06-28 | End: 2024-07-03 | Stop reason: HOSPADM

## 2024-06-28 RX ORDER — PANTOPRAZOLE SODIUM 40 MG/10ML
40 INJECTION, POWDER, LYOPHILIZED, FOR SOLUTION INTRAVENOUS EVERY 12 HOURS SCHEDULED
Status: DISCONTINUED | OUTPATIENT
Start: 2024-06-29 | End: 2024-06-29

## 2024-06-28 RX ADMIN — NITROGLYCERIN 0.4 MG: 0.4 TABLET SUBLINGUAL at 22:42

## 2024-06-28 NOTE — Clinical Note
Level of Care: Telemetry [5]   Diagnosis: CHF exacerbation [[id number]]   Admitting Physician: GURPREET NEUMANN [[id number]]   Attending Physician: GURPREET NEUMANN [[id number]]   Certification: I Certify That Inpatient Hospital Services Are Medically Necessary For Greater Than 2 Midnights

## 2024-06-29 LAB
ANION GAP SERPL CALCULATED.3IONS-SCNC: 14 MMOL/L (ref 5–15)
BACTERIA UR QL AUTO: ABNORMAL /HPF
BASOPHILS # BLD AUTO: 0.04 10*3/MM3 (ref 0–0.2)
BASOPHILS NFR BLD AUTO: 0.5 % (ref 0–1.5)
BILIRUB UR QL STRIP: NEGATIVE
BUN SERPL-MCNC: 34 MG/DL (ref 8–23)
BUN/CREAT SERPL: 18.1 (ref 7–25)
CALCIUM SPEC-SCNC: 9.1 MG/DL (ref 8.6–10.5)
CHLORIDE SERPL-SCNC: 103 MMOL/L (ref 98–107)
CLARITY UR: CLEAR
CO2 SERPL-SCNC: 22 MMOL/L (ref 22–29)
COLOR UR: YELLOW
CREAT SERPL-MCNC: 1.88 MG/DL (ref 0.76–1.27)
DEPRECATED RDW RBC AUTO: 46.1 FL (ref 37–54)
EGFRCR SERPLBLD CKD-EPI 2021: 38.4 ML/MIN/1.73
EOSINOPHIL # BLD AUTO: 0.28 10*3/MM3 (ref 0–0.4)
EOSINOPHIL NFR BLD AUTO: 3.6 % (ref 0.3–6.2)
ERYTHROCYTE [DISTWIDTH] IN BLOOD BY AUTOMATED COUNT: 14.6 % (ref 12.3–15.4)
GLUCOSE BLDC GLUCOMTR-MCNC: 250 MG/DL (ref 70–130)
GLUCOSE BLDC GLUCOMTR-MCNC: 283 MG/DL (ref 70–130)
GLUCOSE BLDC GLUCOMTR-MCNC: 323 MG/DL (ref 70–130)
GLUCOSE BLDC GLUCOMTR-MCNC: 325 MG/DL (ref 70–130)
GLUCOSE SERPL-MCNC: 334 MG/DL (ref 65–99)
GLUCOSE UR STRIP-MCNC: ABNORMAL MG/DL
HCT VFR BLD AUTO: 32.5 % (ref 37.5–51)
HGB BLD-MCNC: 10.1 G/DL (ref 13–17.7)
HGB UR QL STRIP.AUTO: NEGATIVE
HYALINE CASTS UR QL AUTO: ABNORMAL /LPF
IMM GRANULOCYTES # BLD AUTO: 0.04 10*3/MM3 (ref 0–0.05)
IMM GRANULOCYTES NFR BLD AUTO: 0.5 % (ref 0–0.5)
KETONES UR QL STRIP: NEGATIVE
LEUKOCYTE ESTERASE UR QL STRIP.AUTO: NEGATIVE
LYMPHOCYTES # BLD AUTO: 1.08 10*3/MM3 (ref 0.7–3.1)
LYMPHOCYTES NFR BLD AUTO: 14.1 % (ref 19.6–45.3)
MCH RBC QN AUTO: 26.9 PG (ref 26.6–33)
MCHC RBC AUTO-ENTMCNC: 31.1 G/DL (ref 31.5–35.7)
MCV RBC AUTO: 86.7 FL (ref 79–97)
MONOCYTES # BLD AUTO: 0.59 10*3/MM3 (ref 0.1–0.9)
MONOCYTES NFR BLD AUTO: 7.7 % (ref 5–12)
NEUTROPHILS NFR BLD AUTO: 5.65 10*3/MM3 (ref 1.7–7)
NEUTROPHILS NFR BLD AUTO: 73.6 % (ref 42.7–76)
NITRITE UR QL STRIP: NEGATIVE
NRBC BLD AUTO-RTO: 0 /100 WBC (ref 0–0.2)
PH UR STRIP.AUTO: <=5 [PH] (ref 5–8)
PLATELET # BLD AUTO: 215 10*3/MM3 (ref 140–450)
PMV BLD AUTO: 12 FL (ref 6–12)
POTASSIUM SERPL-SCNC: 4.1 MMOL/L (ref 3.5–5.2)
PROT ?TM UR-MCNC: 34.9 MG/DL
PROT UR QL STRIP: ABNORMAL
QT INTERVAL: 366 MS
QTC INTERVAL: 467 MS
RBC # BLD AUTO: 3.75 10*6/MM3 (ref 4.14–5.8)
RBC # UR STRIP: ABNORMAL /HPF
REF LAB TEST METHOD: ABNORMAL
SODIUM SERPL-SCNC: 139 MMOL/L (ref 136–145)
SODIUM UR-SCNC: 120 MMOL/L
SP GR UR STRIP: 1.01 (ref 1–1.03)
SQUAMOUS #/AREA URNS HPF: ABNORMAL /HPF
UROBILINOGEN UR QL STRIP: ABNORMAL
WBC # UR STRIP: ABNORMAL /HPF
WBC NRBC COR # BLD AUTO: 7.68 10*3/MM3 (ref 3.4–10.8)

## 2024-06-29 PROCEDURE — G0378 HOSPITAL OBSERVATION PER HR: HCPCS

## 2024-06-29 PROCEDURE — 80048 BASIC METABOLIC PNL TOTAL CA: CPT | Performed by: HOSPITALIST

## 2024-06-29 PROCEDURE — 81001 URINALYSIS AUTO W/SCOPE: CPT | Performed by: FAMILY MEDICINE

## 2024-06-29 PROCEDURE — 84540 ASSAY OF URINE/UREA-N: CPT | Performed by: INTERNAL MEDICINE

## 2024-06-29 PROCEDURE — 99222 1ST HOSP IP/OBS MODERATE 55: CPT | Performed by: NURSE PRACTITIONER

## 2024-06-29 PROCEDURE — 25010000002 FUROSEMIDE PER 20 MG: Performed by: STUDENT IN AN ORGANIZED HEALTH CARE EDUCATION/TRAINING PROGRAM

## 2024-06-29 PROCEDURE — 25010000002 ERTAPENEM PER 500 MG: Performed by: FAMILY MEDICINE

## 2024-06-29 PROCEDURE — 96374 THER/PROPH/DIAG INJ IV PUSH: CPT

## 2024-06-29 PROCEDURE — 25010000002 ONDANSETRON PER 1 MG: Performed by: HOSPITALIST

## 2024-06-29 PROCEDURE — 96376 TX/PRO/DX INJ SAME DRUG ADON: CPT

## 2024-06-29 PROCEDURE — 82570 ASSAY OF URINE CREATININE: CPT | Performed by: INTERNAL MEDICINE

## 2024-06-29 PROCEDURE — 85025 COMPLETE CBC W/AUTO DIFF WBC: CPT | Performed by: HOSPITALIST

## 2024-06-29 PROCEDURE — 36415 COLL VENOUS BLD VENIPUNCTURE: CPT | Performed by: HOSPITALIST

## 2024-06-29 PROCEDURE — 84156 ASSAY OF PROTEIN URINE: CPT | Performed by: INTERNAL MEDICINE

## 2024-06-29 PROCEDURE — 82948 REAGENT STRIP/BLOOD GLUCOSE: CPT

## 2024-06-29 PROCEDURE — 63710000001 INSULIN LISPRO (HUMAN) PER 5 UNITS: Performed by: HOSPITALIST

## 2024-06-29 PROCEDURE — 96375 TX/PRO/DX INJ NEW DRUG ADDON: CPT

## 2024-06-29 PROCEDURE — 25010000002 BUMETANIDE PER 0.5 MG: Performed by: FAMILY MEDICINE

## 2024-06-29 PROCEDURE — 63710000001 INSULIN GLARGINE PER 5 UNITS: Performed by: FAMILY MEDICINE

## 2024-06-29 PROCEDURE — 84300 ASSAY OF URINE SODIUM: CPT | Performed by: INTERNAL MEDICINE

## 2024-06-29 RX ORDER — PANTOPRAZOLE SODIUM 40 MG/1
40 TABLET, DELAYED RELEASE ORAL
Status: DISCONTINUED | OUTPATIENT
Start: 2024-06-29 | End: 2024-07-03 | Stop reason: HOSPADM

## 2024-06-29 RX ORDER — ROSUVASTATIN CALCIUM 10 MG/1
10 TABLET, COATED ORAL DAILY
Status: DISCONTINUED | OUTPATIENT
Start: 2024-06-29 | End: 2024-07-03 | Stop reason: HOSPADM

## 2024-06-29 RX ORDER — PREGABALIN 50 MG/1
50 CAPSULE ORAL DAILY
Status: DISCONTINUED | OUTPATIENT
Start: 2024-06-29 | End: 2024-07-03 | Stop reason: HOSPADM

## 2024-06-29 RX ORDER — VALSARTAN 80 MG/1
40 TABLET ORAL DAILY
Status: DISCONTINUED | OUTPATIENT
Start: 2024-06-29 | End: 2024-07-02

## 2024-06-29 RX ORDER — BUMETANIDE 0.25 MG/ML
1 INJECTION INTRAMUSCULAR; INTRAVENOUS EVERY 12 HOURS SCHEDULED
Status: DISCONTINUED | OUTPATIENT
Start: 2024-06-29 | End: 2024-06-29

## 2024-06-29 RX ORDER — DULOXETIN HYDROCHLORIDE 30 MG/1
60 CAPSULE, DELAYED RELEASE ORAL DAILY
Status: DISCONTINUED | OUTPATIENT
Start: 2024-06-29 | End: 2024-07-03 | Stop reason: HOSPADM

## 2024-06-29 RX ORDER — OXYCODONE HYDROCHLORIDE 5 MG/1
10 TABLET ORAL 2 TIMES DAILY PRN
Status: DISCONTINUED | OUTPATIENT
Start: 2024-06-29 | End: 2024-07-03 | Stop reason: HOSPADM

## 2024-06-29 RX ORDER — CARVEDILOL 3.12 MG/1
3.12 TABLET ORAL 2 TIMES DAILY WITH MEALS
Status: DISCONTINUED | OUTPATIENT
Start: 2024-06-29 | End: 2024-07-03 | Stop reason: HOSPADM

## 2024-06-29 RX ORDER — ROSUVASTATIN CALCIUM 20 MG/1
20 TABLET, COATED ORAL NIGHTLY
Status: DISCONTINUED | OUTPATIENT
Start: 2024-06-29 | End: 2024-06-29

## 2024-06-29 RX ORDER — LACTULOSE 20 G/30ML
20 SOLUTION ORAL 3 TIMES DAILY
Status: COMPLETED | OUTPATIENT
Start: 2024-06-29 | End: 2024-06-30

## 2024-06-29 RX ORDER — BUMETANIDE 0.25 MG/ML
1 INJECTION INTRAMUSCULAR; INTRAVENOUS
Status: DISCONTINUED | OUTPATIENT
Start: 2024-06-29 | End: 2024-07-02

## 2024-06-29 RX ORDER — PREGABALIN 100 MG/1
100 CAPSULE ORAL NIGHTLY
Status: DISCONTINUED | OUTPATIENT
Start: 2024-06-29 | End: 2024-07-03 | Stop reason: HOSPADM

## 2024-06-29 RX ORDER — VALSARTAN 80 MG/1
40 TABLET ORAL NIGHTLY
Status: DISCONTINUED | OUTPATIENT
Start: 2024-06-29 | End: 2024-07-02

## 2024-06-29 RX ORDER — CETIRIZINE HYDROCHLORIDE 10 MG/1
10 TABLET ORAL DAILY
Status: DISCONTINUED | OUTPATIENT
Start: 2024-06-29 | End: 2024-07-03 | Stop reason: HOSPADM

## 2024-06-29 RX ORDER — TIMOLOL MALEATE 5 MG/ML
1 SOLUTION/ DROPS OPHTHALMIC 2 TIMES DAILY
Status: DISCONTINUED | OUTPATIENT
Start: 2024-06-29 | End: 2024-07-03 | Stop reason: HOSPADM

## 2024-06-29 RX ORDER — ISOSORBIDE MONONITRATE 60 MG/1
30 TABLET, EXTENDED RELEASE ORAL DAILY
Status: DISCONTINUED | OUTPATIENT
Start: 2024-06-29 | End: 2024-07-03 | Stop reason: HOSPADM

## 2024-06-29 RX ORDER — FLUTICASONE PROPIONATE 50 MCG
1 SPRAY, SUSPENSION (ML) NASAL DAILY
Status: DISCONTINUED | OUTPATIENT
Start: 2024-06-29 | End: 2024-07-03 | Stop reason: HOSPADM

## 2024-06-29 RX ORDER — HYDROCODONE BITARTRATE AND ACETAMINOPHEN 10; 325 MG/1; MG/1
1 TABLET ORAL EVERY 6 HOURS PRN
Status: DISCONTINUED | OUTPATIENT
Start: 2024-06-29 | End: 2024-07-03 | Stop reason: HOSPADM

## 2024-06-29 RX ORDER — CALCITRIOL 0.25 UG/1
0.5 CAPSULE, LIQUID FILLED ORAL DAILY
Status: DISCONTINUED | OUTPATIENT
Start: 2024-06-29 | End: 2024-07-03 | Stop reason: HOSPADM

## 2024-06-29 RX ORDER — TAMSULOSIN HYDROCHLORIDE 0.4 MG/1
0.4 CAPSULE ORAL DAILY
Status: DISCONTINUED | OUTPATIENT
Start: 2024-06-29 | End: 2024-07-03 | Stop reason: HOSPADM

## 2024-06-29 RX ORDER — FAMOTIDINE 20 MG/1
20 TABLET, FILM COATED ORAL EVERY 12 HOURS SCHEDULED
Status: DISCONTINUED | OUTPATIENT
Start: 2024-06-29 | End: 2024-06-29

## 2024-06-29 RX ORDER — BUMETANIDE 0.25 MG/ML
0.5 INJECTION INTRAMUSCULAR; INTRAVENOUS EVERY 12 HOURS SCHEDULED
Status: DISCONTINUED | OUTPATIENT
Start: 2024-06-29 | End: 2024-06-29

## 2024-06-29 RX ADMIN — CARVEDILOL 3.12 MG: 3.12 TABLET, FILM COATED ORAL at 17:03

## 2024-06-29 RX ADMIN — CETIRIZINE HYDROCHLORIDE 10 MG: 10 TABLET ORAL at 13:07

## 2024-06-29 RX ADMIN — PREGABALIN 100 MG: 100 CAPSULE ORAL at 21:12

## 2024-06-29 RX ADMIN — FLUTICASONE PROPIONATE 1 SPRAY: 50 SPRAY, METERED NASAL at 13:08

## 2024-06-29 RX ADMIN — Medication 10 ML: at 08:12

## 2024-06-29 RX ADMIN — TIMOLOL MALEATE 1 DROP: 5 SOLUTION/ DROPS OPHTHALMIC at 13:08

## 2024-06-29 RX ADMIN — TIMOLOL MALEATE 1 DROP: 5 SOLUTION/ DROPS OPHTHALMIC at 21:12

## 2024-06-29 RX ADMIN — LACTULOSE 20 G: 20 SOLUTION ORAL at 17:03

## 2024-06-29 RX ADMIN — HYDROCODONE BITARTRATE AND ACETAMINOPHEN 1 TABLET: 10; 325 TABLET ORAL at 18:40

## 2024-06-29 RX ADMIN — CALCITRIOL 0.5 MCG: 0.25 CAPSULE ORAL at 13:07

## 2024-06-29 RX ADMIN — Medication 10 ML: at 01:13

## 2024-06-29 RX ADMIN — PREGABALIN 100 MG: 100 CAPSULE ORAL at 02:27

## 2024-06-29 RX ADMIN — LACTULOSE 20 G: 20 SOLUTION ORAL at 21:11

## 2024-06-29 RX ADMIN — HYDROCODONE BITARTRATE AND ACETAMINOPHEN 1 TABLET: 10; 325 TABLET ORAL at 05:28

## 2024-06-29 RX ADMIN — TAMSULOSIN HYDROCHLORIDE 0.4 MG: 0.4 CAPSULE ORAL at 13:07

## 2024-06-29 RX ADMIN — INSULIN LISPRO 4 UNITS: 100 INJECTION, SOLUTION INTRAVENOUS; SUBCUTANEOUS at 17:03

## 2024-06-29 RX ADMIN — ROSUVASTATIN CALCIUM 10 MG: 20 TABLET, FILM COATED ORAL at 13:04

## 2024-06-29 RX ADMIN — PANTOPRAZOLE SODIUM 40 MG: 40 TABLET, DELAYED RELEASE ORAL at 13:04

## 2024-06-29 RX ADMIN — INSULIN GLARGINE 20 UNITS: 100 INJECTION, SOLUTION SUBCUTANEOUS at 21:12

## 2024-06-29 RX ADMIN — CARVEDILOL 3.12 MG: 3.12 TABLET, FILM COATED ORAL at 10:40

## 2024-06-29 RX ADMIN — ONDANSETRON 4 MG: 2 INJECTION INTRAMUSCULAR; INTRAVENOUS at 17:14

## 2024-06-29 RX ADMIN — BUMETANIDE 1 MG: 0.25 INJECTION INTRAMUSCULAR; INTRAVENOUS at 13:05

## 2024-06-29 RX ADMIN — ONDANSETRON 4 MG: 2 INJECTION INTRAMUSCULAR; INTRAVENOUS at 09:29

## 2024-06-29 RX ADMIN — ERTAPENEM 1000 MG: 1 INJECTION INTRAMUSCULAR; INTRAVENOUS at 13:04

## 2024-06-29 RX ADMIN — BISACODYL 5 MG: 5 TABLET, COATED ORAL at 13:04

## 2024-06-29 RX ADMIN — BUMETANIDE 1 MG: 0.25 INJECTION INTRAMUSCULAR; INTRAVENOUS at 17:03

## 2024-06-29 RX ADMIN — OXYCODONE HYDROCHLORIDE 10 MG: 5 TABLET ORAL at 06:43

## 2024-06-29 RX ADMIN — PREGABALIN 50 MG: 50 CAPSULE ORAL at 13:04

## 2024-06-29 RX ADMIN — POLYETHYLENE GLYCOL 3350 17 G: 17 POWDER, FOR SOLUTION ORAL at 13:05

## 2024-06-29 RX ADMIN — FUROSEMIDE 80 MG: 10 INJECTION, SOLUTION INTRAMUSCULAR; INTRAVENOUS at 01:12

## 2024-06-29 RX ADMIN — INSULIN LISPRO 5 UNITS: 100 INJECTION, SOLUTION INTRAVENOUS; SUBCUTANEOUS at 08:11

## 2024-06-29 RX ADMIN — DULOXETINE HYDROCHLORIDE 60 MG: 30 CAPSULE, DELAYED RELEASE ORAL at 13:07

## 2024-06-29 RX ADMIN — PANTOPRAZOLE SODIUM 80 MG: 40 INJECTION, POWDER, FOR SOLUTION INTRAVENOUS at 01:12

## 2024-06-29 RX ADMIN — INSULIN LISPRO 5 UNITS: 100 INJECTION, SOLUTION INTRAVENOUS; SUBCUTANEOUS at 12:21

## 2024-06-29 RX ADMIN — ISOSORBIDE MONONITRATE 30 MG: 30 TABLET, EXTENDED RELEASE ORAL at 13:07

## 2024-06-29 RX ADMIN — BISACODYL 10 MG: 10 SUPPOSITORY RECTAL at 18:40

## 2024-06-29 RX ADMIN — INSULIN LISPRO 4 UNITS: 100 INJECTION, SOLUTION INTRAVENOUS; SUBCUTANEOUS at 21:12

## 2024-06-29 RX ADMIN — Medication 10 ML: at 21:12

## 2024-06-29 NOTE — CONSULTS
"[facility] CONSULT NOTE    Referring Provider: Latanya Paez MD    Reason for Consultation: \"chf exacerbation\"    Chief Complaint   Patient presents with    Shortness of Breath    Constipation    Illness       Subjective .     History of present illness:  Erick Luong is a 69 y.o. male with a known PMH significant for chronic diastolic heart failure, coronary artery disease with prior coronary artery bypass grafting, chronic noncardiac chest pain, hypertension, hyperlipidemia, chronic kidney disease, diabetes mellitus, chronic anemia, atrial fibrillation, and morbid obesity.  He presented to the emergency department yesterday after not feeling well for 2 to 3 weeks.  The patient tells me that he fell a few weeks ago and since that time is continued to have pain in his abdomen.  He reports chronic wounds on his left lower extremity.  He states he has been more short of breath recently as well as worsening lower extremity swelling.  He reports chest discomfort that comes and goes intermittently similar to what he deals with chronically.    He presented to the emergency department on 6/27/2024.  Chief complaint at that time was abdominal pain dysuria and difficulty urinating.  He was scheduled to see GI in the outpatient setting.  He was recommended to start MiraLAX and was placed on Levaquin for outpatient treatment and discharged home.  He presented back to the emergency department last evening.  Continuing to complain of abdominal pain.  He also reported vomiting, weakness, shortness of breath, chest pain.    He is a poor historian.  He has been hospitalized multiple times and his medication list is unknown.  There are notes in the chart where they tried to clarify with the wife however she was unsure of certain medications.  He has been seen and evaluated in the emergency department multiple times.  He was treated in the [facility] back in September/October 2023.  He was " hospitalized recently for an extended amount of time for management of sepsis.  There have been concerns regarding rectal bleeding during admission back in April.  GI been following with the patient.  Cardiology was not on the case at that time.  His aspirin and Eliquis were discontinued.  He was instructed to follow-up with gastroenterology in the outpatient setting.  It appears he is due to have colonoscopy performed as an outpatient on 7/1.    He has lower extremity edema on exam.  His BNP was elevated.  Chest x-ray notes small pleural effusion and mild edema.  He has chronic chest pain which was reported in the ER.  Troponins were checked, and elevated consistent with prior checks even up to 9 months ago.  No rise and fall has been demonstrated.  The patient reports his chest pain is intermittent and similar to his chronic chest pain that has been deemed noncardiac in the past despite his history of coronary artery disease.  His aspirin has been discontinued.  His ECG appears to be stable without acute ST-T findings and underlying atrial fibrillation.    Cardiology has been consulted due to CHF exacerbation.  Patient reports he has had increased shortness of breath but presented due to ongoing right-sided abdominal discomfort.  He reports is related to a fall.  He also has intermittent chest discomfort.  He describes lower extremity swelling.  He is uncertain about his particular medications but reports he has been taking diuretics.  He denies any known bleeding of recent.  He is uncertain in regards to medication specifically but it appears he has been off of his anticoagulation and aspirin.  He describes discomfort to his lower left extremity which has been managed for chronic wounds.  He describes nausea and vomiting recently.    History  Past Medical History:   Diagnosis Date    Arthritis     Autonomic disease     CAD (coronary artery disease) 02/06/2017    Cervical radiculopathy 09/16/2021    Chronic  constipation with acute exaccerbation 05/10/2021    Coronary artery disease     Degeneration of cervical intervertebral disc 08/11/2021    Diabetes mellitus     Diabetic foot ulcer 08/31/2020    Diabetic polyneuropathy associated with type 2 diabetes mellitus 01/18/2021    Elevated cholesterol     Gastroesophageal reflux disease 05/13/2019    Headache     HTN (hypertension), benign 05/03/2017    Hyperlipidemia     Hypertension     Mixed hyperlipidemia 02/07/2017    Multiple lung nodules 01/26/2020    5mm, 9 mm RLL identified 1/2020, not present 10/2019.    Myocardial infarction     Osteomyelitis 01/22/2020    Osteomyelitis of fifth toe of right foot 10/07/2019    Pancreatitis     Persistent insomnia 01/20/2020    Renal disorder     Sleep apnea 02/06/2017    Sleep apnea with use of continuous positive airway pressure (CPAP)     NON-COMPLIANT    Slow transit constipation 01/16/2019    Spinal stenosis in cervical region 09/16/2021    Vitamin D deficiency 03/02/2021   ,   Past Surgical History:   Procedure Laterality Date    ABDOMINAL SURGERY      AMPUTATION FOOT / TOE Left 10/2021    5th digit     ANTERIOR CERVICAL DISCECTOMY W/ FUSION N/A 08/05/2022    Procedure: CERVICAL DISCECTOMY ANTERIOR WITH FUSION C5-6 with possible plating of C5-7 with neuromonitoring and 1 c-arm;  Surgeon: Karel Soliz MD;  Location:  [facility] OR;  Service: Neurosurgery;  Laterality: N/A;    APPENDECTOMY      BACK SURGERY      CARDIAC CATHETERIZATION Left 02/08/2021    Procedure: Left Heart Cath w poss intervention left anatomical snuff box acess;  Surgeon: Omkar Charles DO;  Location:  [facility] CATH INVASIVE LOCATION;  Service: Cardiology;  Laterality: Left;    CARDIAC SURGERY      CATARACT EXTRACTION      CERVICAL SPINE SURGERY      COLONOSCOPY N/A 01/31/2017    Normal exam repeat in 5 years    COLONOSCOPY N/A 02/11/2019    Mild acute inflammation    COLONOSCOPY N/A 04/07/2024    2 areas at 10 and 20 cm with friability ulceration  2 clips placed at 20 cm and 4 clips at 10 cm poor prep normal mucosa,mild eroisions and ulcerations in visible vessels    COLONOSCOPY W/ POLYPECTOMY  2014    Hyperplastic polyp    CORONARY ARTERY BYPASS GRAFT  10/2015    ENDOSCOPY  2011    Gastritis with hemorrhage    ENDOSCOPY N/A 2017    Normal exam    ENDOSCOPY N/A 2019    Gastritis    ENDOSCOPY N/A 2020    Non-erosive gastritis with hemorrhage    ENDOSCOPY N/A 02/10/2021    Esophagitis    ENDOSCOPY N/A 2024    There were esophageal mucosal changes suspicious for short-segment Low's esophagus present in the distal esophagus. The maximum longitudinal extent of these mucosal changes was 2 cm in length. Mucosa was biopsied with a cold forceps for histologyDistal esophagus, biopsies: Mild chronic active esophagogastritis. No evidence of intestinal metaplasia, dysplasia. Antrum, bx, Mild chronic gastritis    FOOT SURGERY Left     INCISION AND DRAINAGE OF WOUND Left 2007    spider bite   ,   Family History   Problem Relation Age of Onset    Colon cancer Father     Heart disease Father     Colon cancer Sister     Colon polyps Sister     Alzheimer's disease Mother     Coronary artery disease Sister     Coronary artery disease Sister    ,   Social History     Tobacco Use    Smoking status: Former     Current packs/day: 0.00     Types: Cigarettes     Quit date:      Years since quittin.5    Smokeless tobacco: Never    Tobacco comments:     smoked in [employer]   Vaping Use    Vaping status: Never Used   Substance Use Topics    Alcohol use: No    Drug use: No   ,     Medications  Current Facility-Administered Medications   Medication Dose Route Frequency Provider Last Rate Last Admin    acetaminophen (TYLENOL) tablet 650 mg  650 mg Oral Q4H PRN Latanya Paez MD        Or    acetaminophen (TYLENOL) 160 MG/5ML oral solution 650 mg  650 mg Oral Q4H PRN Latanya Paez MD        Or    acetaminophen (TYLENOL) suppository 650  mg  650 mg Rectal Q4H PRN Latanya Paez MD        sennosides-docusate (PERICOLACE) 8.6-50 MG per tablet 2 tablet  2 tablet Oral BID PRN Latanya Paez MD        And    polyethylene glycol (MIRALAX) packet 17 g  17 g Oral Daily PRN Latanya Paez MD        And    bisacodyl (DULCOLAX) EC tablet 5 mg  5 mg Oral Daily PRN Latanya Paez MD        And    bisacodyl (DULCOLAX) suppository 10 mg  10 mg Rectal Daily PRN Latanya Paez MD        bumetanide (BUMEX) injection 1 mg  1 mg Intravenous Q12H Donna Roman, SUSAN        Calcium Replacement - Follow Nurse / BPA Driven Protocol   Does not apply PRN Latanya Paez MD        dextrose (D50W) (25 g/50 mL) IV injection 25 g  25 g Intravenous Q15 Min PRN Latanya Paez MD        dextrose (GLUTOSE) oral gel 15 g  15 g Oral Q15 Min PRN Latanya Paez MD        glucagon (GLUCAGEN) injection 1 mg  1 mg Intramuscular Q15 Min PRN Latanya Paez MD        HYDROcodone-acetaminophen (NORCO)  MG per tablet 1 tablet  1 tablet Oral Q6H PRN Latanya Paez MD   1 tablet at 06/29/24 0528    Insulin Lispro (humaLOG) injection 2-7 Units  2-7 Units Subcutaneous 4x Daily AC & at Bedtime Latanya Paez MD        Magnesium Standard Dose Replacement - Follow Nurse / BPA Driven Protocol   Does not apply PRN Latanya Paez MD        nitroglycerin (NITROSTAT) SL tablet 0.4 mg  0.4 mg Sublingual Q5 Min PRN Latanya Paez MD        ondansetron (ZOFRAN) injection 4 mg  4 mg Intravenous Q6H PRN Latanya Paez MD        oxyCODONE (ROXICODONE) immediate release tablet 10 mg  10 mg Oral BID PRN Latanya Paez MD   10 mg at 06/29/24 0643    pantoprazole (PROTONIX) injection 40 mg  40 mg Intravenous Q12H Latanya Paez MD        Phosphorus Replacement - Follow Nurse / BPA Driven Protocol   Does not apply PRN Latanya Paez MD        Potassium Replacement - Follow Nurse / BPA Driven Protocol   Does not apply PRN Latanya Paez MD        pregabalin (LYRICA) capsule 100 mg  100 mg Oral  "Nightly Latanya Paez MD   100 mg at 06/29/24 0227    sodium chloride 0.9 % flush 10 mL  10 mL Intravenous PRN Steve Arevalo MD        sodium chloride 0.9 % flush 10 mL  10 mL Intravenous Q12H Latanya Paez MD   10 mL at 06/29/24 0113    sodium chloride 0.9 % flush 10 mL  10 mL Intravenous PRN Latanya Paez MD        sodium chloride 0.9 % infusion 40 mL  40 mL Intravenous PRN Latanya Paez MD           Allergies:  Cefepime, Bactrim [sulfamethoxazole-trimethoprim], Vancomycin, Zolpidem, and Metronidazole    Review of Systems  Review of Systems   Constitutional: Positive for malaise/fatigue.   Cardiovascular:  Positive for chest pain, leg swelling and orthopnea. Negative for palpitations, paroxysmal nocturnal dyspnea and syncope.   Respiratory:  Positive for shortness of breath. Negative for wheezing.    Hematologic/Lymphatic: Negative for bleeding problem. Bruises/bleeds easily.   Musculoskeletal:  Positive for falls.   Gastrointestinal:  Positive for abdominal pain, nausea and vomiting.   Neurological:  Positive for weakness.   Psychiatric/Behavioral:  Negative for altered mental status.        Objective     Physical Exam:  Patient Vitals for the past 24 hrs:   BP Temp Temp src Pulse Resp SpO2 Height Weight   06/29/24 0700 139/64 98.2 °F (36.8 °C) Oral 84 18 98 % -- --   06/29/24 0408 133/74 97.8 °F (36.6 °C) Oral 81 18 96 % -- --   06/29/24 0015 151/83 98.4 °F (36.9 °C) Oral 81 18 100 % 182.9 cm (72\") 135 kg (297 lb 13.5 oz)   06/29/24 0001 137/85 98.5 °F (36.9 °C) Oral 85 20 95 % -- --   06/28/24 2246 121/75 -- -- 91 -- 94 % -- --   06/28/24 2231 110/83 -- -- 87 18 99 % -- --   06/28/24 1924 145/77 98.6 °F (37 °C) Oral 95 18 98 % 182.9 cm (72\") 127 kg (280 lb)     Vitals reviewed.   Constitutional:       General: Awake.      Appearance: Well-developed, well-groomed and not in distress. Obese. Chronically ill-appearing.   HENT:      Head: Normocephalic and atraumatic.   Pulmonary:      Effort: " Pulmonary effort is normal.      Breath sounds: Decreased breath sounds present.   Cardiovascular:      Normal rate. Irregularly irregular rhythm.   Edema:     Peripheral edema present.     Pretibial: bilateral 2+ edema of the pretibial area.     Ankle: bilateral edema of the ankle.  Musculoskeletal:      Cervical back: Normal range of motion and neck supple. Skin:     General: Skin is warm and dry.   Neurological:      Mental Status: Alert and oriented to person, place and time.   Psychiatric:         Attention and Perception: Attention normal.         Mood and Affect: Mood normal.         Behavior: Behavior normal. Behavior is cooperative.         Results Review:   I reviewed the patient's new clinical results.    Lab Results (last 72 hours)       Procedure Component Value Units Date/Time    POC Glucose Once [[id number]]  (Abnormal) Collected: 06/29/24 0753    Specimen: Blood Updated: 06/29/24 0804     Glucose 323 mg/dL      Comment: : [id number] Sanford KimMeter ID: [id number]       Single High Sensitivity Troponin T [[id number]]  (Abnormal) Collected: 06/28/24 2218    Specimen: Blood Updated: 06/28/24 2246     HS Troponin T 47 ng/L     Narrative:      High Sensitive Troponin T Reference Range:  <14.0 ng/L- Negative Female for AMI  <22.0 ng/L- Negative Male for AMI  >=14 - Abnormal Female indicating possible myocardial injury.  >=22 - Abnormal Male indicating possible myocardial injury.   Clinicians would have to utilize clinical acumen, EKG, Troponin, and serial changes to determine if it is an Acute Myocardial Infarction or myocardial injury due to an underlying chronic condition.         Comprehensive Metabolic Panel [[id number]]  (Abnormal) Collected: 06/28/24 2009    Specimen: Blood Updated: 06/28/24 2050     Glucose 177 mg/dL      BUN 32 mg/dL      Creatinine 2.01 mg/dL      Sodium 140 mmol/L      Potassium 4.2 mmol/L      Chloride 104 mmol/L      CO2 23.0 mmol/L      Calcium 9.1 mg/dL      Total Protein  6.6 g/dL      Albumin 3.8 g/dL      ALT (SGPT) 9 U/L      AST (SGOT) 14 U/L      Alkaline Phosphatase 108 U/L      Total Bilirubin 0.3 mg/dL      Globulin 2.8 gm/dL      A/G Ratio 1.4 g/dL      BUN/Creatinine Ratio 15.9     Anion Gap 13.0 mmol/L      eGFR 35.5 mL/min/1.73     Narrative:      GFR Normal >60  Chronic Kidney Disease <60  Kidney Failure <15      BNP [[id number]]  (Abnormal) Collected: 06/28/24 2009    Specimen: Blood Updated: 06/28/24 2048     proBNP 3,206.0 pg/mL     Narrative:      This assay is used as an aid in the diagnosis of individuals suspected of having heart failure. It can be used as an aid in the diagnosis of acute decompensated heart failure (ADHF) in patients presenting with signs and symptoms of ADHF to the emergency department (ED). In addition, NT-proBNP of <300 pg/mL indicates ADHF is not likely.    Age Range Result Interpretation  NT-proBNP Concentration (pg/mL:      <50             Positive            >450                   Gray                 300-450                    Negative             <300    50-75           Positive            >900                  Gray                300-900                  Negative            <300      >75             Positive            >1800                  Gray                300-1800                  Negative            <300    Single High Sensitivity Troponin T [[id number]]  (Abnormal) Collected: 06/28/24 2009    Specimen: Blood Updated: 06/28/24 2047     HS Troponin T 51 ng/L     Narrative:      High Sensitive Troponin T Reference Range:  <14.0 ng/L- Negative Female for AMI  <22.0 ng/L- Negative Male for AMI  >=14 - Abnormal Female indicating possible myocardial injury.  >=22 - Abnormal Male indicating possible myocardial injury.   Clinicians would have to utilize clinical acumen, EKG, Troponin, and serial changes to determine if it is an Acute Myocardial Infarction or myocardial injury due to an underlying chronic condition.         CBC &  Differential [[id number]]  (Abnormal) Collected: 06/28/24 2009    Specimen: Blood Updated: 06/28/24 2030    Narrative:      The following orders were created for panel order CBC & Differential.  Procedure                               Abnormality         Status                     ---------                               -----------         ------                     CBC Auto Differential[[id number]]        Abnormal            Final result                 Please view results for these tests on the individual orders.    CBC Auto Differential [[id number]]  (Abnormal) Collected: 06/28/24 2009    Specimen: Blood Updated: 06/28/24 2030     WBC 10.18 10*3/mm3      RBC 3.87 10*6/mm3      Hemoglobin 10.5 g/dL      Hematocrit 33.5 %      MCV 86.6 fL      MCH 27.1 pg      MCHC 31.3 g/dL      RDW 14.6 %      RDW-SD 46.5 fl      MPV 12.0 fL      Platelets 252 10*3/mm3      Neutrophil % 71.6 %      Lymphocyte % 15.2 %      Monocyte % 8.6 %      Eosinophil % 3.7 %      Basophil % 0.5 %      Immature Grans % 0.4 %      Neutrophils, Absolute 7.28 10*3/mm3      Lymphocytes, Absolute 1.55 10*3/mm3      Monocytes, Absolute 0.88 10*3/mm3      Eosinophils, Absolute 0.38 10*3/mm3      Basophils, Absolute 0.05 10*3/mm3      Immature Grans, Absolute 0.04 10*3/mm3      nRBC 0.0 /100 WBC             Imaging Results (Last 72 Hours)       Procedure Component Value Units Date/Time    XR Chest 1 View [[id number]] Collected: 06/28/24 2115     Updated: 06/28/24 2119    Narrative:      EXAM: XR CHEST 1 VW- 6/28/2024 7:03 PM     HISTORY: [facility] Triage Protocol       COMPARISON: 5/3/2024.     TECHNIQUE: Single frontal radiograph of the chest was obtained.     FINDINGS:     Support Devices: Prior median sternotomy.     Cardiac and Mediastinal Silhouettes: Normal.     Lungs/Pleura: Mild interstitial prominence. Suspected small right  pleural effusion. No visible pneumothorax.     Osseous structures: No acute osseous finding.     Other: None.       Impression:          Suspected small left pleural effusion.     Mild interstitial prominence may suggest mild edema.           This report was signed and finalized on 6/28/2024 9:16 PM by Ronal Wisdom.             Results for orders placed during the hospital encounter of 03/26/24    Adult Transthoracic Echo Complete W/ Cont if Necessary Per Protocol    Interpretation Summary    The study is technically difficult for diagnosis.  If there is concern for possible infective endocarditis, recommend transesophageal echocardiogram to better visualize the valves.    Left ventricular systolic function is normal. Left ventricular ejection fraction appears to be 61 - 65%.    Left ventricular wall thickness is consistent with mild concentric hypertrophy    Left ventricular diastolic function is consistent with (grade III w/high LAP) fixed restrictive pattern.    Mildly reduced right ventricular systolic function noted.    The left atrial cavity is mildly dilated.    There is mild calcification of the aortic valve. No aortic valve regurgitation is present. No hemodynamically significant aortic valve stenosis is present.      Assessment     1.  Abdominal pain  2.  Status post fall  3.  Acute on chronic diastolic heart failure: on home oral diuretics uncertain of most recent dose  4.  Coronary artery disease/prior coronary artery bypass grafting: CABG in approximately 2015.  Was previously not on aspirin due to anticoagulation with Eliquis now not on either.  Would recommend low-dose baby aspirin at a minimum if unable to resume anticoagulation due to anemia and further gastro workup.  5.  Persistent atrial fibrillation: Not chronically anticoagulated currently based off of documentation however not confirmed by the patient due to GI workup with complaints of rectal bleeding and anemia  6.  Anemia: Stable  7.  Hypertension  8.  Hyperlipidemia  9.  Diabetes mellitus, type II: on home insulin  10.  Chronic wounds: Left lower extremity  11.   CKD, stage III  12.  Obstructive sleep apnea  13.  Possible gastroparesis  14.  Chronic noncardiac chest pain  15.  Morbid obesity BMI 40.39    Plan     Cardiology was consulted due to CHF exacerbation.  Patient was given a dose of IV Lasix in the emergency department typically is managed on bumetanide at home.  We will start the patient on IV diuretics and closely monitor renal function and urinary response.  He has some increased shortness of breath some evidence of volume overload on imaging and an elevated BNP.  He has significant lower extremity edema.    In regards to his anticoagulation/antiplatelet management would recommend that the patient be at least administered a low-dose aspirin while off of anticoagulation.  Once anticoagulation can be resumed for CVA prophylaxis in the setting of persistent atrial fibrillation aspirin could be discontinued.    BMP in a.m.  Strict intake and output documentation  Daily weights  Low sodium diet  Resume home statin and beta blocker therapy.   Recommend resumption of aspirin.   Would recommend reconciliation of home medications ASAP    Thank you for the consultation, cardiology will gladly continue to follow.     Electronically signed by SUSAN Santos, 06/29/24, 8:32 AM CDT.      Please note this cardiology consultation note is the result of a face to face consultation with the patient, in addition to reviewing medical records at length by myself, SUSAN Santos.       Time: 65 minutes

## 2024-06-29 NOTE — PLAN OF CARE
Goal Outcome Evaluation:           Progress: no change   Pt A/Ox4. VSS on RA. IV to RFA, with ABX infusing. Strict I &O. Telemetry running A-Fib. SCDs. Bilateral edema to LE. Up to chair, voiding via urinal. Diabetic ulcer to L foot and heel. Chronic constipation but recently hasn't not had a BM for 2 weeks. Bowel regime started. [place]. Wound care, Nephrology, cardiology, and GI consults. Family at bedside. Not a good historian. Safety maintained. Call light in reach.

## 2024-06-29 NOTE — PLAN OF CARE
Goal Outcome Evaluation:  Plan of Care Reviewed With: patient        Progress: no change  Outcome Evaluation: Pt arrived to the floor from the ER. A/Ox4. Forgetful. [place]. Upx1, unsteady gait. Chronic wounds noted on LLE, gauze and kerlex applied. Voiding without difficulty via urinal. Pt remains up in chair, pt states that he can't breath well while laying in bed. Pt c/o having abdominal pain at times. Pt reports that his last BM was over 2 weeks ago and states that he occasionally will expell some gas but that is all. Stable on room air. Afib with HR 76-87on tele. Safety maintained.

## 2024-06-29 NOTE — NURSING NOTE
RN spoke with pt's spouse via phone call. Attempted to review home meds with her but she was unsure on some of them. RN told spouse that a med rec request was submitted overnight. Spouse was able to confirm pt's home pain med, Dr. Paez notified and ordered. Pt's spouse requested that Dr. Paez consult nephrology d/t pt's CKD. Dr. Paez notified and stated that the day shift MD can take care of other consults and meds needed for the pt.

## 2024-06-29 NOTE — CONSULTS
Nephrology ([facility]) Consult Note      Patient:  Erick Luong  YOB: 1955  Date of Service: 6/29/2024  MRN: [id number]   Acct: [id number]   Primary Care Physician: Del Shetty MD  Advance Directive:   There are no questions and answers to display.     Admit Date: 6/28/2024       Hospital Day: 1  Referring Provider: Latanya Paez MD      Patient personally seen and examined.  Complete chart including Consults, Notes, Operative Reports, Labs, Cardiology, and Radiology studies reviewed as able.        Subjective:  Erick Luong is a 69 y.o. male for whom we were consulted for evaluation and treatment of chronic kidney disease stage IIIb.  He has stage IIIb chronic kidney disease baseline and follows with Dr. Lomas in the office as he has diabetic nephropathy.  He has a history of insulin-dependent type 2 diabetes, hypertension, abdominal obesity, obstructive sleep apnea, diabetic foot ulcer, Vitamin D Deficiency and coronary artery disease.  He had multiple complaints over the last week including no bowel movement x 2 weeks, several falls at home, cystitis, GI bleed with planned colonoscopy coming up on Monday and increasing peripheral edema.  Also had some diffuse abdominal pain from the falls.  Denied hematuria, dysuria, hemoptysis, rash.  Did have bruising and scratches from the falls.  Seen with family for initial evaluation.    Allergies:  Cefepime, Bactrim [sulfamethoxazole-trimethoprim], Vancomycin, Zolpidem, and Metronidazole    Home Meds:  Medications Prior to Admission   Medication Sig Dispense Refill Last Dose    DULoxetine (CYMBALTA) 60 MG capsule Take 1 capsule by mouth Daily. 90 capsule 4 Patient Taking Differently    ascorbic acid (VITAMIN C) 1000 MG tablet Take 1 tablet by mouth Daily. 30 tablet 3     bumetanide (BUMEX) 2 MG tablet Take 1 tablet by mouth Daily. (Patient taking differently: Take 1 tablet by mouth 2 (Two) Times a Day.) 90 tablet 0      calcitriol (ROCALTROL) 0.5 MCG capsule Take 1 capsule by mouth Daily. 90 capsule 4     carvedilol (COREG) 3.125 MG tablet Take 1 tablet twice a day by oral route. 60 tablet 4     Cholecalciferol (D-5000) 125 MCG (5000 UT) tablet Take 1 tablet by mouth Daily Before Lunch. 30 tablet 2     Diclofenac Sodium (VOLTAREN) 1 % gel gel Apply 2 g topically to the appropriate area as directed 4 (Four) Times a Day As Needed. 300 g 11     donepezil (ARICEPT) 10 MG tablet Take 1 tablet by mouth Daily. 90 tablet 1     famotidine (PEPCID) 20 MG tablet Take 1 tablet twice a day by oral route. 180 tablet 4     fluticasone (FLONASE) 50 MCG/ACT nasal spray Instill 1 spray in each nostril as directed by provider Daily. 16 g 1     Insulin Glargine (Lantus SoloStar) 100 UNIT/ML injection pen Inject 20 Units under the skin into the appropriate area as directed Every Evening. (Patient not taking: Reported on 5/3/2024) 15 mL 3     Insulin Glargine (Lantus SoloStar) 100 UNIT/ML injection pen Inject 20 Units under the skin into the appropriate area as directed Every Night. 15 mL 3     Insulin Glargine (Lantus SoloStar) 100 UNIT/ML injection pen Inject 20 Units under the skin into the appropriate area as directed every night at bedtime. 15 mL 3     Insulin Regular Human, Conc, (HumuLIN R U-500 KwikPen) 500 UNIT/ML solution pen-injector CONCENTRATED injection Inject 15 Units under the skin into the appropriate area as directed 3 (Three) Times a Day Before Meals AND 40 Units Daily.       Iron-Vitamin C (Vitron-C)  MG tablet Take 1 tablet twice a day by oral route. 60 tablet 2     isosorbide mononitrate (IMDUR) 30 MG 24 hr tablet Take 1 tablet by mouth Daily.       lactulose (Enulose) 10 GM/15ML solution solution (encephalopathy) Take 30 mL by mouth 2 (Two) Times a Day. 1800 mL 11     lactulose (Enulose) 10 GM/15ML solution solution (encephalopathy) Take 30 mL by mouth 3 times a day for 30 days. 2838 mL 11     levocetirizine (XYZAL) 5 MG  tablet Take 1 tablet by mouth every day at bedtime. 30 tablet 2     levoFLOXacin (LEVAQUIN) 750 MG tablet Take 1 tablet by mouth Daily. 7 tablet 0     melatonin 3 MG tablet Take 2 tablets by mouth At Night As Needed for Sleep. 30 tablet 0     metoclopramide (REGLAN) 5 MG tablet Take 1 tablet by mouth 3 times a day. (Patient not taking: Reported on 5/3/2024)       midodrine (PROAMATINE) 10 MG tablet Take 1 tablet by mouth 3 (Three) Times a Day. 270 tablet 4     midodrine (PROAMATINE) 2.5 MG tablet Take 1 tablet by mouth 3 (Three) Times a Day Before Meals.       ondansetron ODT (ZOFRAN-ODT) 4 MG disintegrating tablet Place 1 tablet on the tongue Every 8 (Eight) Hours As Needed for Nausea or Vomiting. 21 tablet 0     oxyCODONE (ROXICODONE) 10 MG tablet Take 1 tablet by mouth 2 (Two) Times a Day As Needed. 55 tablet 0     oxyCODONE (ROXICODONE) 10 MG tablet Take 1 tablet by mouth 2 (Two) Times a Day As Needed---MUST last 30 days 55 tablet 0     pantoprazole (Protonix) 40 MG EC tablet Take 1 tablet by mouth 2 (Two) Times a Day. 180 tablet 4     pantoprazole (PROTONIX) 40 MG EC tablet Take 1 tablet by mouth Every 12 (Twelve) Hours. 180 tablet 4     polyethylene glycol (GoLYTELY) 236 g solution As directed by SUSAN Velásquez 4000 mL 0     polyethylene glycol (MIRALAX) 17 g packet Take 17 g by mouth Daily. Obtain OTC       polyethylene glycol (MIRALAX) 17 g packet Take 17 g by mouth 2 (Two) Times a Day for 3 days. 6 packet 0     polyethylene glycol-electrolytes (NULYTELY) 420 g solution Take as directed. 4000 mL 0     pregabalin (LYRICA) 100 MG capsule Take 2 capsules by mouth Every Night.       pregabalin (LYRICA) 50 MG capsule Take 1 capsule by mouth Daily.       rosuvastatin (CRESTOR) 10 MG tablet Take 1 tablet by mouth Daily.       rosuvastatin (CRESTOR) 10 MG tablet Take 1 tablet by mouth Every Night. 30 tablet 2     sennosides-docusate (PERICOLACE) 8.6-50 MG per tablet Take 1 tablet by mouth Every Night. Obtain  OTC       sodium hypochlorite (DAKIN'S 1/4 STRENGTH) 0.125 % solution topical solution 0.125% Apply solution three times daily 473 mL 5     sodium hypochlorite (DAKIN'S 1/4 STRENGTH) 0.125 % solution topical solution 0.125% Apply 1 application topically to the appropriate area as directed 2 (Two) Times a Day. 473 mL 5     sucralfate (CARAFATE) 1 g tablet Take 1 tablet by mouth 3 times a day.       sucralfate (CARAFATE) 1 g tablet Take 1 tablet by mouth 4 (Four) Times a Day before meals. 360 tablet 1     tamsulosin (FLOMAX) 0.4 MG capsule 24 hr capsule Take 1 capsule by mouth Daily. 90 capsule 1     timolol (TIMOPTIC) 0.5 % ophthalmic solution Administer 1 drop to both eyes 2 (Two) Times a Day.       valsartan (DIOVAN) 40 MG tablet Take 1 tablet by mouth Daily.       valsartan (DIOVAN) 40 MG tablet Take 1 tablet by mouth Every Night. 30 tablet 2     zinc sulfate (ZINCATE) 50 MG capsule Take 1 capsule by mouth Daily.          Medicines:  Current Facility-Administered Medications   Medication Dose Route Frequency Provider Last Rate Last Admin    acetaminophen (TYLENOL) tablet 650 mg  650 mg Oral Q4H PRN Latanya Peaz MD        Or    acetaminophen (TYLENOL) 160 MG/5ML oral solution 650 mg  650 mg Oral Q4H PRN Latanya Paez MD        Or    acetaminophen (TYLENOL) suppository 650 mg  650 mg Rectal Q4H PRN Latanya Paez MD        sennosides-docusate (PERICOLACE) 8.6-50 MG per tablet 2 tablet  2 tablet Oral BID PRN Latanya Paez MD        And    polyethylene glycol (MIRALAX) packet 17 g  17 g Oral Daily PRN Latanya Paez MD   17 g at 06/29/24 1305    And    bisacodyl (DULCOLAX) EC tablet 5 mg  5 mg Oral Daily PRN Latanya Paez MD   5 mg at 06/29/24 1304    And    bisacodyl (DULCOLAX) suppository 10 mg  10 mg Rectal Daily PRN Latanya Paez MD        bumetanide (BUMEX) injection 1 mg  1 mg Intravenous BID Josué De Oliveira MD   1 mg at 06/29/24 2682    calcitriol (ROCALTROL) capsule 0.5 mcg  0.5 mcg Oral Daily  Josué De Oliveira MD   0.5 mcg at 06/29/24 1307    Calcium Replacement - Follow Nurse / BPA Driven Protocol   Does not apply PRN Latanya Paez MD        carvedilol (COREG) tablet 3.125 mg  3.125 mg Oral BID With Meals Donna Roman, APRSHERRILL   3.125 mg at 06/29/24 1040    cetirizine (zyrTEC) tablet 10 mg  10 mg Oral Daily Josué De Oliveira MD   10 mg at 06/29/24 1307    dextrose (D50W) (25 g/50 mL) IV injection 25 g  25 g Intravenous Q15 Min PRN Latanya Paez MD        dextrose (GLUTOSE) oral gel 15 g  15 g Oral Q15 Min PRN Latanya Paez MD        DULoxetine (CYMBALTA) DR capsule 60 mg  60 mg Oral Daily Josué De Oliveira MD   60 mg at 06/29/24 1307    ertapenem (INVanz) 1,000 mg in sodium chloride 0.9 % 100 mL MBP  1,000 mg Intravenous Q24H Josué De Oliveira  mL/hr at 06/29/24 1304 1,000 mg at 06/29/24 1304    fluticasone (FLONASE) 50 MCG/ACT nasal spray 1 spray  1 spray Nasal Daily Josué De Oliveira MD   1 spray at 06/29/24 1308    glucagon (GLUCAGEN) injection 1 mg  1 mg Intramuscular Q15 Min PRN Latanya Paez MD        HYDROcodone-acetaminophen (NORCO)  MG per tablet 1 tablet  1 tablet Oral Q6H PRN Latanya Paez MD   1 tablet at 06/29/24 0528    insulin glargine (LANTUS, SEMGLEE) injection 20 Units  20 Units Subcutaneous Nightly Josué De Oliveira MD        Insulin Lispro (humaLOG) injection 2-7 Units  2-7 Units Subcutaneous 4x Daily AC & at Bedtime Latanya Paez MD   5 Units at 06/29/24 1221    isosorbide mononitrate (IMDUR) 24 hr tablet 30 mg  30 mg Oral Daily Josué De Oliveira MD   30 mg at 06/29/24 1307    lactulose solution 20 g  20 g Oral TID Josué De Oliveira MD        Magnesium Standard Dose Replacement - Follow Nurse / BPA Driven Protocol   Does not apply PRN Latanya Paez MD        nitroglycerin (NITROSTAT) SL tablet 0.4 mg  0.4 mg Sublingual Q5 Min PRN Latanya Paez MD        ondansetron (ZOFRAN) injection 4 mg  4 mg Intravenous Q6H PRN Latanya Paez MD   4 mg at  06/29/24 0929    oxyCODONE (ROXICODONE) immediate release tablet 10 mg  10 mg Oral BID PRN Latanya Paez MD   10 mg at 06/29/24 0643    pantoprazole (PROTONIX) EC tablet 40 mg  40 mg Oral QAM AC Josué De Oliveira MD   40 mg at 06/29/24 1304    Phosphorus Replacement - Follow Nurse / BPA Driven Protocol   Does not apply PRN Latanya Paez MD        Potassium Replacement - Follow Nurse / BPA Driven Protocol   Does not apply PRN Latanya Paez MD        pregabalin (LYRICA) capsule 100 mg  100 mg Oral Nightly Latanya Paez MD   100 mg at 06/29/24 0227    pregabalin (LYRICA) capsule 50 mg  50 mg Oral Daily Josué De Oliveira MD   50 mg at 06/29/24 1304    rosuvastatin (CRESTOR) tablet 10 mg  10 mg Oral Daily Josué De Oliveira MD   10 mg at 06/29/24 1304    sodium chloride 0.9 % flush 10 mL  10 mL Intravenous Q12H Latanya Paez MD   10 mL at 06/29/24 0812    sodium chloride 0.9 % flush 10 mL  10 mL Intravenous PRN Latanya Paez MD        sodium chloride 0.9 % infusion 40 mL  40 mL Intravenous PRN Latanya Paez MD        tamsulosin (FLOMAX) 24 hr capsule 0.4 mg  0.4 mg Oral Daily Josué De Oliveira MD   0.4 mg at 06/29/24 1307    timolol (TIMOPTIC) 0.5 % ophthalmic solution 1 drop  1 drop Both Eyes BID Josué De Oliveira MD   1 drop at 06/29/24 1308    [Held by provider] valsartan (DIOVAN) tablet 40 mg  40 mg Oral Daily Josué De Oliveira MD        [Held by provider] valsartan (DIOVAN) tablet 40 mg  40 mg Oral Nightly Josué De Oliveira MD           Past Medical History:  Past Medical History:   Diagnosis Date    Arthritis     Autonomic disease     CAD (coronary artery disease) 02/06/2017    Cervical radiculopathy 09/16/2021    Chronic constipation with acute exaccerbation 05/10/2021    Coronary artery disease     Degeneration of cervical intervertebral disc 08/11/2021    Diabetes mellitus     Diabetic foot ulcer 08/31/2020    Diabetic polyneuropathy associated with type 2 diabetes mellitus 01/18/2021     Elevated cholesterol     Gastroesophageal reflux disease 05/13/2019    Headache     HTN (hypertension), benign 05/03/2017    Hyperlipidemia     Hypertension     Mixed hyperlipidemia 02/07/2017    Multiple lung nodules 01/26/2020    5mm, 9 mm RLL identified 1/2020, not present 10/2019.    Myocardial infarction     Osteomyelitis 01/22/2020    Osteomyelitis of fifth toe of right foot 10/07/2019    Pancreatitis     Persistent insomnia 01/20/2020    Renal disorder     Sleep apnea 02/06/2017    Sleep apnea with use of continuous positive airway pressure (CPAP)     NON-COMPLIANT    Slow transit constipation 01/16/2019    Spinal stenosis in cervical region 09/16/2021    Vitamin D deficiency 03/02/2021       Past Surgical History:  Past Surgical History:   Procedure Laterality Date    ABDOMINAL SURGERY      AMPUTATION FOOT / TOE Left 10/2021    5th digit     ANTERIOR CERVICAL DISCECTOMY W/ FUSION N/A 08/05/2022    Procedure: CERVICAL DISCECTOMY ANTERIOR WITH FUSION C5-6 with possible plating of C5-7 with neuromonitoring and 1 c-arm;  Surgeon: Karel Soliz MD;  Location:  [facility] OR;  Service: Neurosurgery;  Laterality: N/A;    APPENDECTOMY      BACK SURGERY      CARDIAC CATHETERIZATION Left 02/08/2021    Procedure: Left Heart Cath w poss intervention left anatomical snuff box acess;  Surgeon: Omkar Charles DO;  Location:  [facility] CATH INVASIVE LOCATION;  Service: Cardiology;  Laterality: Left;    CARDIAC SURGERY      CATARACT EXTRACTION      CERVICAL SPINE SURGERY      COLONOSCOPY N/A 01/31/2017    Normal exam repeat in 5 years    COLONOSCOPY N/A 02/11/2019    Mild acute inflammation    COLONOSCOPY N/A 04/07/2024    2 areas at 10 and 20 cm with friability ulceration 2 clips placed at 20 cm and 4 clips at 10 cm poor prep normal mucosa,mild eroisions and ulcerations in visible vessels    COLONOSCOPY W/ POLYPECTOMY  03/04/2014    Hyperplastic polyp    CORONARY ARTERY BYPASS GRAFT  10/2015    ENDOSCOPY  04/13/2011     Gastritis with hemorrhage    ENDOSCOPY N/A 2017    Normal exam    ENDOSCOPY N/A 2019    Gastritis    ENDOSCOPY N/A 2020    Non-erosive gastritis with hemorrhage    ENDOSCOPY N/A 02/10/2021    Esophagitis    ENDOSCOPY N/A 2024    There were esophageal mucosal changes suspicious for short-segment Low's esophagus present in the distal esophagus. The maximum longitudinal extent of these mucosal changes was 2 cm in length. Mucosa was biopsied with a cold forceps for histologyDistal esophagus, biopsies: Mild chronic active esophagogastritis. No evidence of intestinal metaplasia, dysplasia. Antrum, bx, Mild chronic gastritis    FOOT SURGERY Left     INCISION AND DRAINAGE OF WOUND Left 2007    spider bite       Family History  Family History   Problem Relation Age of Onset    Colon cancer Father     Heart disease Father     Colon cancer Sister     Colon polyps Sister     Alzheimer's disease Mother     Coronary artery disease Sister     Coronary artery disease Sister        Social History  Social History     Socioeconomic History    Marital status:    Tobacco Use    Smoking status: Former     Current packs/day: 0.00     Types: Cigarettes     Quit date:      Years since quittin.5    Smokeless tobacco: Never    Tobacco comments:     [place]   Vaping Use    Vaping status: Never Used   Substance and Sexual Activity    Alcohol use: No    Drug use: No    Sexual activity: Defer         Review of Systems:  History obtained from chart review and the patient  General ROS: No fever or chills  Respiratory ROS: No cough, +shortness of breath  Cardiovascular ROS: +chest pain or palpitations  Gastrointestinal ROS: + abdominal pain or melena  Genito-Urinary ROS: No dysuria or hematuria  Psych ROS: No anxiety and depression  14 point ROS reviewed with the patient and negative except as noted above and in the HPI unless unable to obtain.      Objective:  Patient Vitals for the  "past 24 hrs:   BP Temp Temp src Pulse Resp SpO2 Height Weight   06/29/24 1500 130/76 97.4 °F (36.3 °C) Oral 63 16 98 % -- --   06/29/24 1100 129/73 97.6 °F (36.4 °C) Oral 77 18 99 % -- --   06/29/24 0700 139/64 98.2 °F (36.8 °C) Oral 84 18 98 % -- --   06/29/24 0408 133/74 97.8 °F (36.6 °C) Oral 81 18 96 % -- --   06/29/24 0015 151/83 98.4 °F (36.9 °C) Oral 81 18 100 % 182.9 cm (72\") 135 kg (297 lb 13.5 oz)   06/29/24 0001 137/85 98.5 °F (36.9 °C) Oral 85 20 95 % -- --   06/28/24 2246 121/75 -- -- 91 -- 94 % -- --   06/28/24 2231 110/83 -- -- 87 18 99 % -- --   06/28/24 1924 145/77 98.6 °F (37 °C) Oral 95 18 98 % 182.9 cm (72\") 127 kg (280 lb)       Intake/Output Summary (Last 24 hours) at 6/29/2024 1641  Last data filed at 6/29/2024 1500  Gross per 24 hour   Intake 200 ml   Output 4300 ml   Net -4100 ml     General: awake/alert   Chest:  Decreased AE without w/r/r  CVS: IRRR  Abdominal: soft, mild diffuse ttp, +bs  Extremities: ble edema  Skin: warm and dry without rash      Labs:  Results from last 7 days   Lab Units 06/29/24  0850 06/28/24 2009 06/27/24 0612   WBC 10*3/mm3 7.68 10.18 8.28   HEMOGLOBIN g/dL 10.1* 10.5* 10.3*   HEMATOCRIT % 32.5* 33.5* 32.5*   PLATELETS 10*3/mm3 215 252 215         Results from last 7 days   Lab Units 06/29/24  0850 06/28/24 2009 06/27/24 0612   SODIUM mmol/L 139 140 140   POTASSIUM mmol/L 4.1 4.2 4.3   CHLORIDE mmol/L 103 104 106   CO2 mmol/L 22.0 23.0 22.0   BUN mg/dL 34* 32* 31*   CREATININE mg/dL 1.88* 2.01* 1.86*   CALCIUM mg/dL 9.1 9.1 9.0   EGFR mL/min/1.73 38.4* 35.5* 38.9*   BILIRUBIN mg/dL  --  0.3 0.4   ALK PHOS U/L  --  108 108   ALT (SGPT) U/L  --  9 10   AST (SGOT) U/L  --  14 12   GLUCOSE mg/dL 334* 177* 394*       Radiology:   Imaging Results (Last 72 Hours)       Procedure Component Value Units Date/Time    XR Chest 1 View [[id number]] Collected: 06/28/24 2115     Updated: 06/28/24 2119    Narrative:      EXAM: XR CHEST 1 VW- 6/28/2024 7:03 PM     HISTORY: SOA " Triage Protocol       COMPARISON: 5/3/2024.     TECHNIQUE: Single frontal radiograph of the chest was obtained.     FINDINGS:     Support Devices: Prior median sternotomy.     Cardiac and Mediastinal Silhouettes: Normal.     Lungs/Pleura: Mild interstitial prominence. Suspected small right  pleural effusion. No visible pneumothorax.     Osseous structures: No acute osseous finding.     Other: None.       Impression:         Suspected small left pleural effusion.     Mild interstitial prominence may suggest mild edema.           This report was signed and finalized on 6/28/2024 9:16 PM by Ronal Wisdom.               Culture:  Urine Culture   Date Value Ref Range Status   06/27/2024 >100,000 CFU/mL Escherichia coli (A)  Preliminary     Comment:     Possible ESBL, confirmation in progress.         Assessment   Chronic kidney disease stage IIIb  Diabetes type 2 with diabetic nephropathy  Acute on chronic diastolic congestive heart failure  Secondary hyperparathyroidism  Anemia and chronic kidney disease  CAD  Atrial Fibrillation  Hypertension    Plan:  Discussed with patient, nursing  Work-up reviewed today  IV diuretics with close lab and clinical monitoring  Cardiology evaluation reviewed  Bowel regimen per primary  Awaiting GI evaluation  Reassess in the morning  Further testing ordered ongoing      Thank you for the consult, we appreciate the opportunity to provide care to your patients.  Feel free to contact me if I can be of any further assistance.      Willy Arteaga MD  6/29/2024  16:41 CDT

## 2024-06-29 NOTE — ED PROVIDER NOTES
"Subjective   History of Present Illness  Symptoms started about a week ago--or two or three weeks.    Two or three weeks ago, he fell and hurt his stomach and his stomach is sore. Sore since the fall. Bruising occurred and has improved. Stomach feels like someone stuck a knife in the stomach. In the middle. Been two and a half weeks since he had a BM; has passed no stool.     Vomiting: started a couple weeks ago. Twice a day. Vomit looks like mainly water. Keeping down food and water otherwise. When asked if there is blood in his vomit he says yes, after he fell. He has a colonoscopy Monday for GI bleeding. The blood in the vomit looks like more black than anything.     Weakness: started after he fell. All over. Won't discuss this further, moves on despite asking. He clarifies he was weak before the fall but it got a lot worse. When asked about focality he says whe nhe goes walking he gets out of breath. Legs feel weak. Arms are weak.    Trouble breathing: after he fell. Sudden. Gradually getting worse. Comes and goes. What makes it worse: get out moving around. What makes it better: laying down makes it worse. On some days, it's just better.    Chest pain: yes. Feels like sharp pain in the middle. Duration is \"a good while.\" Off and on. Frequency: had it all day today. Occurring right now. Rates it as hurts bad, he'd say an 8. When asked when this particular episode started he goes back to saying it's been a while.     Fever: yes. No recorded febrile temps at home.      Review of Systems   Constitutional:  Positive for fever.   Respiratory:  Positive for shortness of breath.    Cardiovascular:  Positive for chest pain.   Gastrointestinal:  Positive for abdominal pain and vomiting.   Genitourinary:  Negative for difficulty urinating and dysuria.   Neurological:  Negative for syncope.       Past Medical History:   Diagnosis Date    Arthritis     Autonomic disease     CAD (coronary artery disease) 02/06/2017    " "Cervical radiculopathy 09/16/2021    Chronic constipation with acute exaccerbation 05/10/2021    Coronary artery disease     Degeneration of cervical intervertebral disc 08/11/2021    Diabetes mellitus     Diabetic foot ulcer 08/31/2020    Diabetic polyneuropathy associated with type 2 diabetes mellitus 01/18/2021    Elevated cholesterol     Gastroesophageal reflux disease 05/13/2019    Headache     HTN (hypertension), benign 05/03/2017    Hyperlipidemia     Hypertension     Mixed hyperlipidemia 02/07/2017    Multiple lung nodules 01/26/2020    5mm, 9 mm RLL identified 1/2020, not present 10/2019.    Myocardial infarction     Osteomyelitis 01/22/2020    Osteomyelitis of fifth toe of right foot 10/07/2019    Pancreatitis     Persistent insomnia 01/20/2020    Renal disorder     Sleep apnea 02/06/2017    Sleep apnea with use of continuous positive airway pressure (CPAP)     NON-COMPLIANT    Slow transit constipation 01/16/2019    Spinal stenosis in cervical region 09/16/2021    Vitamin D deficiency 03/02/2021       Allergies   Allergen Reactions    Cefepime Hives and Anaphylaxis    Bactrim [Sulfamethoxazole-Trimethoprim] Other (See Comments)     \"RENAL FAILURE\"    Vancomycin Itching    Zolpidem Mental Status Change     \"makes him crazy\"    Metronidazole Rash       Past Surgical History:   Procedure Laterality Date    ABDOMINAL SURGERY      AMPUTATION FOOT / TOE Left 10/2021    5th digit     ANTERIOR CERVICAL DISCECTOMY W/ FUSION N/A 08/05/2022    Procedure: CERVICAL DISCECTOMY ANTERIOR WITH FUSION C5-6 with possible plating of C5-7 with neuromonitoring and 1 c-arm;  Surgeon: Karel Soliz MD;  Location:  [facility] OR;  Service: Neurosurgery;  Laterality: N/A;    APPENDECTOMY      BACK SURGERY      CARDIAC CATHETERIZATION Left 02/08/2021    Procedure: Left Heart Cath w poss intervention left anatomical snuff box acess;  Surgeon: Omkar Charles DO;  Location:  [facility] CATH INVASIVE LOCATION;  Service: " Cardiology;  Laterality: Left;    CARDIAC SURGERY      CATARACT EXTRACTION      CERVICAL SPINE SURGERY      COLONOSCOPY N/A 2017    Normal exam repeat in 5 years    COLONOSCOPY N/A 2019    Mild acute inflammation    COLONOSCOPY N/A 2024    2 areas at 10 and 20 cm with friability ulceration 2 clips placed at 20 cm and 4 clips at 10 cm poor prep normal mucosa,mild eroisions and ulcerations in visible vessels    COLONOSCOPY W/ POLYPECTOMY  2014    Hyperplastic polyp    CORONARY ARTERY BYPASS GRAFT  10/2015    ENDOSCOPY  2011    Gastritis with hemorrhage    ENDOSCOPY N/A 2017    Normal exam    ENDOSCOPY N/A 2019    Gastritis    ENDOSCOPY N/A 2020    Non-erosive gastritis with hemorrhage    ENDOSCOPY N/A 02/10/2021    Esophagitis    ENDOSCOPY N/A 2024    There were esophageal mucosal changes suspicious for short-segment Low's esophagus present in the distal esophagus. The maximum longitudinal extent of these mucosal changes was 2 cm in length. Mucosa was biopsied with a cold forceps for histologyDistal esophagus, biopsies: Mild chronic active esophagogastritis. No evidence of intestinal metaplasia, dysplasia. Antrum, bx, Mild chronic gastritis    FOOT SURGERY Left     INCISION AND DRAINAGE OF WOUND Left 2007    spider bite       Family History   Problem Relation Age of Onset    Colon cancer Father     Heart disease Father     Colon cancer Sister     Colon polyps Sister     Alzheimer's disease Mother     Coronary artery disease Sister     Coronary artery disease Sister        Social History     Socioeconomic History    Marital status:    Tobacco Use    Smoking status: Former     Current packs/day: 0.00     Types: Cigarettes     Quit date:      Years since quittin.5    Smokeless tobacco: Never    Tobacco comments:     smoked in highschool   Vaping Use    Vaping status: Never Used   Substance and Sexual Activity    Alcohol use: No    Drug use: No     Sexual activity: Defer           Objective   Physical Exam  Vitals reviewed.   Constitutional:       General: He is not in acute distress.  HENT:      Head: Normocephalic and atraumatic.   Eyes:      Extraocular Movements: Extraocular movements intact.      Conjunctiva/sclera: Conjunctivae normal.   Cardiovascular:      Pulses: Normal pulses.      Heart sounds: Normal heart sounds.   Pulmonary:      Effort: Pulmonary effort is normal. No respiratory distress.      Breath sounds: Normal breath sounds. No wheezing.   Abdominal:      General: Abdomen is flat. There is no distension.      Tenderness: There is abdominal tenderness (mild in mid-abdomen and epigastrium none in RUQ LUQ or bilateral lower quadrants). There is no guarding or rebound.   Musculoskeletal:      Cervical back: Normal range of motion and neck supple.      Right lower leg: Edema present.      Left lower leg: Edema present.   Skin:     General: Skin is warm and dry.   Neurological:      General: No focal deficit present.      Mental Status: He is alert. Mental status is at baseline.   Psychiatric:         Behavior: Behavior normal.         Thought Content: Thought content normal.         Procedures           ED Course  ED Course as of 06/29/24 0504   Fri Jun 28, 2024   2249 Repeat tn downtrended by 4 [AS]      ED Course User Index  [AS] Steve Arevalo MD                                             Medical Decision Making  Problems Addressed:  Chest pain with high risk for cardiac etiology: complicated acute illness or injury    Amount and/or Complexity of Data Reviewed  Labs: ordered.  ECG/medicine tests: ordered.    Risk  Prescription drug management.  Decision regarding hospitalization.      Erick Luong is a 69 y.o. male with PMH above who presents to the Emergency Department with multiple complaints.  He has a fairly nonlinear thought process; he is recently had abdominal pain workup with a normal CT and his labs here today are  unremarkable, and his abdominal exam is overall reassuring without pain in any focal surgical area to suggest surgical emergency like bowel obstruction, malrotation or volvulus, cholecystitis, appendicitis, or diverticulitis.  His chest pain is atypical but he has a history of CAD with no recent provocative testing and his troponin turned out to be dynamic.  His EKG was without any focal ischemic abnormality.  Trial nitroglycerin for chest pain did not seem to help much.  He also has exertional dyspnea which is explained by pulmonary effusions, and elevated BNP, and edema.  Therefore he will be admitted for consideration of cardiac ischemic evaluation although his troponin downtrending does not suggest he needs an emergent cath at this time, as well as fluid overload management.      Final diagnosis: Fluid overload, chest pain with high risk of cardiac etiology.    All questions answered. Patient/family was understanding and in agreement with today's assessment and plan. The patient was monitored during their stay in the ED and dispositioned without acute event.    Electronically signed by:  Steve Arevalo MD 6/29/2024 05:04 CDT      Note: Dragon medical dictation software was used in the creation of this note.      Final diagnoses:   Chest pain with high risk for cardiac etiology       ED Disposition  ED Disposition       ED Disposition   Decision to Admit    Condition   --    Comment   Level of Care: Telemetry [5]   Diagnosis: CHF (congestive heart failure), NYHA class I, acute on chronic, combined [[id number]]   Admitting Physician: GURPREET NEUMANN [[id number]]   Certification: I Certify That Inpatient Hospital Services Are Medically Necessary For Greater Than 2 Midnights                 No follow-up provider specified.       Medication List      No changes were made to your prescriptions during this visit.            Steve Arevalo MD  06/29/24 0506

## 2024-06-29 NOTE — H&P
"    [facility] Medicine Services  HISTORY AND PHYSICAL    Date of Admission: 6/28/2024  Primary Care Physician: Del Shetty MD    Subjective   Primary Historian: patient     Chief Complaint: chest pain, shortness of breath     Shortness of Breath    Constipation    Illness      69 years old male with significant past medical history of morbid obesity questionable sleep apnea history of diabetes hypertension hyperlipidemia diastolic heart failure who is extremely poor historian who presents with multiple complaints Two or three weeks ago, he fell and hurt his stomach and his stomach is sore. Sore since the fall. Bruising occurred and has improved. Stomach feels like someone stuck a knife in the stomach. In the middle. Been two and a half weeks since he had a BM; has passed no stool.      Vomiting: started a couple weeks ago. Twice a day. Vomit looks like mainly water. Keeping down food and water otherwise. When asked if there is blood in his vomit he says yes, after he fell. He has a colonoscopy Monday for GI bleeding. The blood in the vomit looks like more black than anything.      Weakness: started after he fell. All over. Won't discuss this further, moves on despite asking. He clarifies he was weak before the fall but it got a lot worse. When asked about focality he says whe nhe goes walking he gets out of breath. Legs feel weak. Arms are weak.    Trouble breathing: after he fell. Sudden. Gradually getting worse. Comes and goes. What makes it worse: get out moving around. What makes it better: laying down makes it worse. On some days, it's just better.     Chest pain: yes. Feels like sharp pain in the middle. Duration is \"a good while.\" Off and on. Frequency: had it all day today. Occurring right now. Rates it as hurts bad, he'd say an 8. When asked when this particular episode started he goes back to saying it's been a while.  In the ER the patient cardiac enzymes were " marginal, BNP was skyhigh chest x-ray was showing bilateral pleural effusion was diagnosed with acute on chronic diastolic congestive heart failure started on Lasix, patient last A1c 11.2 indicating poor compliance also has been complaining of possible vomiting blood hemoglobin stable he is scheduled to have EGD colonoscopy we will go ahead and put him and started him on Protonix hold all anticoagulation continue Lasix I's and O's Daily weights will consult cardiology for chest pain Place him on SCDs for DVT prophylaxis identify reconcile continue home medications patient might need to be seen by GI      Review of Systems   Respiratory:  Positive for shortness of breath.    Gastrointestinal:  Positive for constipation.      Otherwise complete ROS reviewed and negative except as mentioned in the HPI.    Past Medical History:   Past Medical History:   Diagnosis Date    Arthritis     Autonomic disease     CAD (coronary artery disease) 02/06/2017    Cervical radiculopathy 09/16/2021    Chronic constipation with acute exaccerbation 05/10/2021    Coronary artery disease     Degeneration of cervical intervertebral disc 08/11/2021    Diabetes mellitus     Diabetic foot ulcer 08/31/2020    Diabetic polyneuropathy associated with type 2 diabetes mellitus 01/18/2021    Elevated cholesterol     Gastroesophageal reflux disease 05/13/2019    Headache     HTN (hypertension), benign 05/03/2017    Hyperlipidemia     Hypertension     Mixed hyperlipidemia 02/07/2017    Multiple lung nodules 01/26/2020    5mm, 9 mm RLL identified 1/2020, not present 10/2019.    Myocardial infarction     Osteomyelitis 01/22/2020    Osteomyelitis of fifth toe of right foot 10/07/2019    Pancreatitis     Persistent insomnia 01/20/2020    Renal disorder     Sleep apnea 02/06/2017    Sleep apnea with use of continuous positive airway pressure (CPAP)     NON-COMPLIANT    Slow transit constipation 01/16/2019    Spinal stenosis in cervical region 09/16/2021     Vitamin D deficiency 03/02/2021     Past Surgical History:  Past Surgical History:   Procedure Laterality Date    ABDOMINAL SURGERY      AMPUTATION FOOT / TOE Left 10/2021    5th digit     ANTERIOR CERVICAL DISCECTOMY W/ FUSION N/A 08/05/2022    Procedure: CERVICAL DISCECTOMY ANTERIOR WITH FUSION C5-6 with possible plating of C5-7 with neuromonitoring and 1 c-arm;  Surgeon: Karel Soliz MD;  Location:  [facility] OR;  Service: Neurosurgery;  Laterality: N/A;    APPENDECTOMY      BACK SURGERY      CARDIAC CATHETERIZATION Left 02/08/2021    Procedure: Left Heart Cath w poss intervention left anatomical snuff box acess;  Surgeon: Omkar Charles DO;  Location:  [facility] CATH INVASIVE LOCATION;  Service: Cardiology;  Laterality: Left;    CARDIAC SURGERY      CATARACT EXTRACTION      CERVICAL SPINE SURGERY      COLONOSCOPY N/A 01/31/2017    Normal exam repeat in 5 years    COLONOSCOPY N/A 02/11/2019    Mild acute inflammation    COLONOSCOPY N/A 04/07/2024    2 areas at 10 and 20 cm with friability ulceration 2 clips placed at 20 cm and 4 clips at 10 cm poor prep normal mucosa,mild eroisions and ulcerations in visible vessels    COLONOSCOPY W/ POLYPECTOMY  03/04/2014    Hyperplastic polyp    CORONARY ARTERY BYPASS GRAFT  10/2015    ENDOSCOPY  04/13/2011    Gastritis with hemorrhage    ENDOSCOPY N/A 05/05/2017    Normal exam    ENDOSCOPY N/A 02/11/2019    Gastritis    ENDOSCOPY N/A 09/01/2020    Non-erosive gastritis with hemorrhage    ENDOSCOPY N/A 02/10/2021    Esophagitis    ENDOSCOPY N/A 04/11/2024    There were esophageal mucosal changes suspicious for short-segment Low's esophagus present in the distal esophagus. The maximum longitudinal extent of these mucosal changes was 2 cm in length. Mucosa was biopsied with a cold forceps for histologyDistal esophagus, biopsies: Mild chronic active esophagogastritis. No evidence of intestinal metaplasia, dysplasia. Antrum, bx, Mild chronic gastritis    FOOT SURGERY  "Left     INCISION AND DRAINAGE OF WOUND Left 09/2007    spider bite     Social History:  reports that he quit smoking about 33 years ago. His smoking use included cigarettes. He has never used smokeless tobacco. He reports that he does not drink alcohol and does not use drugs.    Family History: family history includes Alzheimer's disease in his mother; Colon cancer in his father and sister; Colon polyps in his sister; Coronary artery disease in his sister and sister; Heart disease in his father.       Allergies:  Allergies   Allergen Reactions    Cefepime Hives and Anaphylaxis    Bactrim [Sulfamethoxazole-Trimethoprim] Other (See Comments)     \"RENAL FAILURE\"    Vancomycin Itching    Zolpidem Mental Status Change     \"makes him crazy\"    Metronidazole Rash       Medications:  Prior to Admission medications    Medication Sig Start Date End Date Taking? Authorizing Provider   ascorbic acid (VITAMIN C) 1000 MG tablet Take 1 tablet by mouth Daily. 8/25/23      bumetanide (BUMEX) 2 MG tablet Take 1 tablet by mouth Daily.  Patient taking differently: Take 1 tablet by mouth 2 (Two) Times a Day. 4/12/24   Rene Maloney MD   calcitriol (ROCALTROL) 0.5 MCG capsule Take 1 capsule by mouth Daily. 2/23/23      carvedilol (COREG) 3.125 MG tablet Take 1 tablet twice a day by oral route. 3/26/24      Cholecalciferol (D-5000) 125 MCG (5000 UT) tablet Take 1 tablet by mouth Daily Before Lunch. 8/25/23      Diclofenac Sodium (VOLTAREN) 1 % gel gel Apply 2 g topically to the appropriate area as directed 4 (Four) Times a Day As Needed. 6/1/23      donepezil (ARICEPT) 10 MG tablet Take 1 tablet by mouth Daily. 5/3/24      DULoxetine (CYMBALTA) 60 MG capsule Take 1 capsule by mouth Daily. 3/11/24      famotidine (PEPCID) 20 MG tablet Take 1 tablet twice a day by oral route. 3/26/24      fluticasone (FLONASE) 50 MCG/ACT nasal spray Instill 1 spray in each nostril as directed by provider Daily. 4/18/24      Insulin Glargine (Lantus " SoloStar) 100 UNIT/ML injection pen Inject 20 Units under the skin into the appropriate area as directed Every Evening.  Patient not taking: Reported on 5/3/2024 3/11/24      Insulin Glargine (Lantus SoloStar) 100 UNIT/ML injection pen Inject 20 Units under the skin into the appropriate area as directed Every Night. 4/18/24      Insulin Glargine (Lantus SoloStar) 100 UNIT/ML injection pen Inject 20 Units under the skin into the appropriate area as directed every night at bedtime. 5/9/24      Insulin Regular Human, Conc, (HumuLIN R U-500 KwikPen) 500 UNIT/ML solution pen-injector CONCENTRATED injection Inject 15 Units under the skin into the appropriate area as directed 3 (Three) Times a Day Before Meals AND 40 Units Daily. 4/11/24   Rene Maloney MD   Iron-Vitamin C (Vitron-C)  MG tablet Take 1 tablet twice a day by oral route. 4/18/24      isosorbide mononitrate (IMDUR) 30 MG 24 hr tablet Take 1 tablet by mouth Daily.    ProviderBossman MD   lactulose (Enulose) 10 GM/15ML solution solution (encephalopathy) Take 30 mL by mouth 2 (Two) Times a Day. 5/23/24      lactulose (Enulose) 10 GM/15ML solution solution (encephalopathy) Take 30 mL by mouth 3 times a day for 30 days. 6/19/24 7/19/24     levocetirizine (XYZAL) 5 MG tablet Take 1 tablet by mouth every day at bedtime. 5/9/24      levoFLOXacin (LEVAQUIN) 750 MG tablet Take 1 tablet by mouth Daily. 6/27/24   Faisal Pascual MD   melatonin 3 MG tablet Take 2 tablets by mouth At Night As Needed for Sleep. 4/11/24   Rene Maloney MD   metoclopramide (REGLAN) 5 MG tablet Take 1 tablet by mouth 3 times a day.  Patient not taking: Reported on 5/3/2024    Bossman Blount MD   midodrine (PROAMATINE) 10 MG tablet Take 1 tablet by mouth 3 (Three) Times a Day. 5/6/24      midodrine (PROAMATINE) 2.5 MG tablet Take 1 tablet by mouth 3 (Three) Times a Day Before Meals.    Provider, MD Bossman   ondansetron ODT (ZOFRAN-ODT) 4 MG disintegrating  tablet Place 1 tablet on the tongue Every 8 (Eight) Hours As Needed for Nausea or Vomiting. 4/11/24   Rene Maloney MD   oxyCODONE (ROXICODONE) 10 MG tablet Take 1 tablet by mouth 2 (Two) Times a Day As Needed. 5/17/24      oxyCODONE (ROXICODONE) 10 MG tablet Take 1 tablet by mouth 2 (Two) Times a Day As Needed---MUST last 30 days 6/19/24      pantoprazole (Protonix) 40 MG EC tablet Take 1 tablet by mouth 2 (Two) Times a Day. 11/8/22      pantoprazole (PROTONIX) 40 MG EC tablet Take 1 tablet by mouth Every 12 (Twelve) Hours. 6/19/24      polyethylene glycol (GoLYTELY) 236 g solution As directed by SUSAN Velásquez 5/30/24   Cristopher Hannah MD   polyethylene glycol (MIRALAX) 17 g packet Take 17 g by mouth Daily. Obtain OTC 8/23/23   Lexie Houston APRN   polyethylene glycol (MIRALAX) 17 g packet Take 17 g by mouth 2 (Two) Times a Day for 3 days. 6/27/24 6/30/24  Faisal Pascual MD   polyethylene glycol-electrolytes (NULYTELY) 420 g solution Take as directed. 6/7/24   Cristopher Hannah MD   pregabalin (LYRICA) 100 MG capsule Take 2 capsules by mouth Every Night.    Bossman Blount MD   pregabalin (LYRICA) 50 MG capsule Take 1 capsule by mouth Daily.    Bossman Blount MD   rosuvastatin (CRESTOR) 10 MG tablet Take 1 tablet by mouth Daily.    Bossman Blount MD   rosuvastatin (CRESTOR) 10 MG tablet Take 1 tablet by mouth Every Night. 5/9/24      sennosides-docusate (PERICOLACE) 8.6-50 MG per tablet Take 1 tablet by mouth Every Night. Obtain OTC 8/23/23   Lexie Houston APRN   sodium hypochlorite (DAKIN'S 1/4 STRENGTH) 0.125 % solution topical solution 0.125% Apply solution three times daily 4/15/24      sodium hypochlorite (DAKIN'S 1/4 STRENGTH) 0.125 % solution topical solution 0.125% Apply 1 application topically to the appropriate area as directed 2 (Two) Times a Day. 5/13/24      sucralfate (CARAFATE) 1 g tablet Take 1 tablet by mouth 3 times a day.    Provider, MD Bossman   sucralfate  "(CARAFATE) 1 g tablet Take 1 tablet by mouth 4 (Four) Times a Day before meals. 5/3/24      tamsulosin (FLOMAX) 0.4 MG capsule 24 hr capsule Take 1 capsule by mouth Daily. 8/7/23      timolol (TIMOPTIC) 0.5 % ophthalmic solution Administer 1 drop to both eyes 2 (Two) Times a Day.    Provider, MD Bossman   valsartan (DIOVAN) 40 MG tablet Take 1 tablet by mouth Daily.    Provider, MD Bossman   valsartan (DIOVAN) 40 MG tablet Take 1 tablet by mouth Every Night. 5/9/24      zinc sulfate (ZINCATE) 50 MG capsule Take 1 capsule by mouth Daily.    Provider, Bossman, MD   spironolactone (ALDACTONE) 25 MG tablet Take 1 tablet by mouth Daily. 9/28/20 12/31/20  Del Shetty MD     I have utilized all available immediate resources to obtain, update, or review the patient's current medications (including all prescriptions, over-the-counter products, herbals, cannabis/cannabidiol products, and vitamin/mineral/dietary (nutritional) supplements).    Objective     Vital Signs: /83   Pulse 87   Temp 98.6 °F (37 °C) (Oral)   Resp 18   Ht 182.9 cm (72\")   Wt 127 kg (280 lb)   SpO2 99%   BMI 37.97 kg/m²   Physical Exam  Constitutional:       Appearance: Normal appearance. He is obese.   HENT:      Head: Normocephalic.      Nose: Nose normal.   Eyes:      Extraocular Movements: Extraocular movements intact.      Pupils: Pupils are equal, round, and reactive to light.   Cardiovascular:      Rate and Rhythm: Normal rate and regular rhythm.   Pulmonary:      Breath sounds: Wheezing, rhonchi and rales present.   Abdominal:      General: Abdomen is flat.      Palpations: Abdomen is soft.   Musculoskeletal:      Cervical back: Normal range of motion.      Right lower leg: Edema present.      Left lower leg: Edema present.   Skin:     General: Skin is warm.      Capillary Refill: Capillary refill takes less than 2 seconds.   Neurological:      General: No focal deficit present.      Mental Status: He is alert.      "         Results Reviewed:  Lab Results (last 24 hours)       Procedure Component Value Units Date/Time    Single High Sensitivity Troponin T [[id number]]  (Abnormal) Collected: 06/28/24 2218    Specimen: Blood Updated: 06/28/24 2246     HS Troponin T 47 ng/L     Narrative:      High Sensitive Troponin T Reference Range:  <14.0 ng/L- Negative Female for AMI  <22.0 ng/L- Negative Male for AMI  >=14 - Abnormal Female indicating possible myocardial injury.  >=22 - Abnormal Male indicating possible myocardial injury.   Clinicians would have to utilize clinical acumen, EKG, Troponin, and serial changes to determine if it is an Acute Myocardial Infarction or myocardial injury due to an underlying chronic condition.         Comprehensive Metabolic Panel [[id number]]  (Abnormal) Collected: 06/28/24 2009    Specimen: Blood Updated: 06/28/24 2050     Glucose 177 mg/dL      BUN 32 mg/dL      Creatinine 2.01 mg/dL      Sodium 140 mmol/L      Potassium 4.2 mmol/L      Chloride 104 mmol/L      CO2 23.0 mmol/L      Calcium 9.1 mg/dL      Total Protein 6.6 g/dL      Albumin 3.8 g/dL      ALT (SGPT) 9 U/L      AST (SGOT) 14 U/L      Alkaline Phosphatase 108 U/L      Total Bilirubin 0.3 mg/dL      Globulin 2.8 gm/dL      A/G Ratio 1.4 g/dL      BUN/Creatinine Ratio 15.9     Anion Gap 13.0 mmol/L      eGFR 35.5 mL/min/1.73     Narrative:      GFR Normal >60  Chronic Kidney Disease <60  Kidney Failure <15      BNP [[id number]]  (Abnormal) Collected: 06/28/24 2009    Specimen: Blood Updated: 06/28/24 2048     proBNP 3,206.0 pg/mL     Narrative:      This assay is used as an aid in the diagnosis of individuals suspected of having heart failure. It can be used as an aid in the diagnosis of acute decompensated heart failure (ADHF) in patients presenting with signs and symptoms of ADHF to the emergency department (ED). In addition, NT-proBNP of <300 pg/mL indicates ADHF is not likely.    Age Range Result Interpretation  NT-proBNP Concentration  (pg/mL:      <50             Positive            >450                   Gray                 300-450                    Negative             <300    50-75           Positive            >900                  Gray                300-900                  Negative            <300      >75             Positive            >1800                  Gray                300-1800                  Negative            <300    Single High Sensitivity Troponin T [[id number]]  (Abnormal) Collected: 06/28/24 2009    Specimen: Blood Updated: 06/28/24 2047     HS Troponin T 51 ng/L     Narrative:      High Sensitive Troponin T Reference Range:  <14.0 ng/L- Negative Female for AMI  <22.0 ng/L- Negative Male for AMI  >=14 - Abnormal Female indicating possible myocardial injury.  >=22 - Abnormal Male indicating possible myocardial injury.   Clinicians would have to utilize clinical acumen, EKG, Troponin, and serial changes to determine if it is an Acute Myocardial Infarction or myocardial injury due to an underlying chronic condition.         [place] Draw [[id number]] Collected: 06/28/24 2009    Specimen: Blood Updated: 06/28/24 2031    Narrative:      The following orders were created for panel order [place] Draw.  Procedure                               Abnormality         Status                     ---------                               -----------         ------                     Green Top (Gel)[[id number]]                                  Final result               Lavender Top[[id number]]                                     Final result               Red Top[[id number]]                                          Final result               Gray Top[[id number]]                                         Final result               Light Blue Top[[id number]]                                   Final result                 Please view results for these tests on the individual orders.    Green Top (Gel) [[id number]] Collected: 06/28/24 2009     Specimen: Blood Updated: 06/28/24 2031     Extra Tube Hold for add-ons.     Comment: Auto resulted.       Lavender Top [[id number]] Collected: 06/28/24 2009    Specimen: Blood Updated: 06/28/24 2031     Extra Tube hold for add-on     Comment: Auto resulted       Red Top [[id number]] Collected: 06/28/24 2009    Specimen: Blood Updated: 06/28/24 2031     Extra Tube Hold for add-ons.     Comment: Auto resulted.       Gray Top [[id number]] Collected: 06/28/24 2009    Specimen: Blood Updated: 06/28/24 2031     Extra Tube Hold for add-ons.     Comment: Auto resulted.       Light Blue Top [[id number]] Collected: 06/28/24 2009    Specimen: Blood Updated: 06/28/24 2031     Extra Tube Hold for add-ons.     Comment: Auto resulted       CBC & Differential [[id number]]  (Abnormal) Collected: 06/28/24 2009    Specimen: Blood Updated: 06/28/24 2030    Narrative:      The following orders were created for panel order CBC & Differential.  Procedure                               Abnormality         Status                     ---------                               -----------         ------                     CBC Auto Differential[[id number]]        Abnormal            Final result                 Please view results for these tests on the individual orders.    CBC Auto Differential [[id number]]  (Abnormal) Collected: 06/28/24 2009    Specimen: Blood Updated: 06/28/24 2030     WBC 10.18 10*3/mm3      RBC 3.87 10*6/mm3      Hemoglobin 10.5 g/dL      Hematocrit 33.5 %      MCV 86.6 fL      MCH 27.1 pg      MCHC 31.3 g/dL      RDW 14.6 %      RDW-SD 46.5 fl      MPV 12.0 fL      Platelets 252 10*3/mm3      Neutrophil % 71.6 %      Lymphocyte % 15.2 %      Monocyte % 8.6 %      Eosinophil % 3.7 %      Basophil % 0.5 %      Immature Grans % 0.4 %      Neutrophils, Absolute 7.28 10*3/mm3      Lymphocytes, Absolute 1.55 10*3/mm3      Monocytes, Absolute 0.88 10*3/mm3      Eosinophils, Absolute 0.38 10*3/mm3      Basophils, Absolute 0.05 10*3/mm3       Immature Grans, Absolute 0.04 10*3/mm3      nRBC 0.0 /100 WBC           Imaging Results (Last 24 Hours)       Procedure Component Value Units Date/Time    XR Chest 1 View [[id number]] Collected: 06/28/24 2115     Updated: 06/28/24 2119    Narrative:      EXAM: XR CHEST 1 VW- 6/28/2024 7:03 PM     HISTORY: [facility] Triage Protocol       COMPARISON: 5/3/2024.     TECHNIQUE: Single frontal radiograph of the chest was obtained.     FINDINGS:     Support Devices: Prior median sternotomy.     Cardiac and Mediastinal Silhouettes: Normal.     Lungs/Pleura: Mild interstitial prominence. Suspected small right  pleural effusion. No visible pneumothorax.     Osseous structures: No acute osseous finding.     Other: None.       Impression:         Suspected small left pleural effusion.     Mild interstitial prominence may suggest mild edema.           This report was signed and finalized on 6/28/2024 9:16 PM by Ronal Wisdom.             I have personally reviewed and interpreted the radiology studies and ECG obtained at time of admission.     Assessment / Plan   Assessment:   Active Hospital Problems    Diagnosis     **CHF exacerbation     CHF (congestive heart failure), NYHA class I, acute on chronic, combined        Treatment Plan  The patient will be admitted to my service here at [facility].   In the ER the patient cardiac enzymes were marginal, BNP was skyhigh chest x-ray was showing bilateral pleural effusion was diagnosed with acute on chronic diastolic congestive heart failure started on Lasix, patient last A1c 11.2 indicating poor compliance also has been complaining of possible vomiting blood hemoglobin stable he is scheduled to have EGD colonoscopy we will go ahead and put him and started him on Protonix hold all anticoagulation continue Lasix I's and O's Daily weights will consult cardiology for chest pain Place him on SCDs for DVT prophylaxis identify reconcile continue home medications patient might  need to be seen by GI  Medical Decision Making  Number and Complexity of problems: 3 acute   Differential Diagnosis: As above    Conditions and Status        Condition is at treatment goal.     MDM Data  External documents reviewed: no  Cardiac tracing (EKG, telemetry) interpretation: yes  Radiology interpretation: yes  Labs reviewed: yes  Any tests that were considered but not ordered: no     Decision rules/scores evaluated (example [id number], [place], etc): no      Discussed with: ER     Care Planning  Shared decision making: Patient apprised of current labs, vitals, imaging and treatment plan.  They are agreeable with proceeding with plans as discussed.   Code status and discussions: full     Disposition  Social Determinants of Health that impact treatment or disposition: no  Estimated length of stay is tbd .     I confirmed that the patient's advanced care plan is present, code status is documented, and a surrogate decision maker is listed in the patient's medical record.       The patient was seen and examined by me on 2330 at [facility] .    Electronically signed by Latanya Paez MD, 06/28/24, 23:24 CDT.

## 2024-06-29 NOTE — PROGRESS NOTES
[facility] Medicine Services  INPATIENT PROGRESS NOTE    Patient Name: Erick Luong  Date of Admission: 6/28/2024  Today's Date: 06/29/24  Length of Stay: 1  Primary Care Physician: Del Shetty MD    Subjective   Chief Complaint: Pain, shortness of breath  HPI   69-year-old male with history of diabetes mellitus type 2, atrial fibrillation, coronary artery disease, prior diabetic foot ulcer, diabetic polyneuropathy, hypertension, hyperlipidemia, osteomyelitis of the foot, insomnia, chronic kidney disease stage, obstructive sleep apnea, poor compliance with CPAP, cervical spine stenosis, vitamin D deficiency, admitted last night with multiple complaints.  See history and physical.    Today, patient reports not feeling well, reports chest pain, abdominal pain, shortness of breath; when questioned about urinary symptoms, he states that he has had trouble urinating for several years, but on further questioning he does report dysuria.  No fevers reported.  Patient is a very poor historian, does not know name of medications that he is taking.  There is no family member present at the time of my evaluation.        Review of Systems   All pertinent negatives and positives are as above. All other systems have been reviewed and are negative unless otherwise stated.     Objective    Temp:  [97.8 °F (36.6 °C)-98.6 °F (37 °C)] 98.2 °F (36.8 °C)  Heart Rate:  [81-95] 84  Resp:  [18-20] 18  BP: (110-151)/(64-85) 139/64  Physical Exam  Constitutional:       Appearance: Sitting in chair.  No respiratory distress.  No tachypnea.  Alert, oriented x 3.  HENT:      Head: Normocephalic and atraumatic.      Nose: Nose normal.      Mouth/Throat:      Mouth: Mucous membranes are moist.      Pharynx: Oropharynx is clear.   Eyes:      Extraocular Movements: Extraocular movements intact.      Conjunctiva/sclera: Conjunctivae normal.      Pupils: Pupils are equal, round, and reactive to light.    Cardiovascular:      Rate and Rhythm: irregular rhythm.      Pulses: Normal pulses.   Pulmonary:      Effort: No respiratory distress.      Breath sounds: Decreased breath sounds in both bases, scant Rales.  No wheezing  Abdominal:      General: Abdomen is obese.  Bowel sounds are normal.      Palpations: Abdomen is soft.      Tenderness: There is no guarding or rebound.   Musculoskeletal:         General: Normal range of motion.      Cervical back: Normal range of motion and neck supple.   Extremities:  No lower extremity edema.  Skin:     Capillary Refill: Capillary refill takes less than 2 seconds.      Coloration: Skin is not jaundiced.      Findings: No rash.   Neurological:      General: No focal deficit present.      Mental Status: Patient is alert, oriented to place time and person.     Sensory: No sensory deficit.      Motor: No weakness.      Coordination: Coordination normal.   Psychiatric:         Mood and Affect: Mood normal.         Behavior: Behavior normal.           Results Review:  I have reviewed the labs, radiology results, and diagnostic studies.    Laboratory Data:   Results from last 7 days   Lab Units 06/29/24  0850 06/28/24 2009 06/27/24 0612   WBC 10*3/mm3 7.68 10.18 8.28   HEMOGLOBIN g/dL 10.1* 10.5* 10.3*   HEMATOCRIT % 32.5* 33.5* 32.5*   PLATELETS 10*3/mm3 215 252 215        Results from last 7 days   Lab Units 06/29/24  0850 06/28/24 2009 06/27/24 0612   SODIUM mmol/L 139 140 140   POTASSIUM mmol/L 4.1 4.2 4.3   CHLORIDE mmol/L 103 104 106   CO2 mmol/L 22.0 23.0 22.0   BUN mg/dL 34* 32* 31*   CREATININE mg/dL 1.88* 2.01* 1.86*   CALCIUM mg/dL 9.1 9.1 9.0   BILIRUBIN mg/dL  --  0.3 0.4   ALK PHOS U/L  --  108 108   ALT (SGPT) U/L  --  9 10   AST (SGOT) U/L  --  14 12   GLUCOSE mg/dL 334* 177* 394*       Culture Data:   Urine Culture   Date Value Ref Range Status   06/27/2024 >100,000 CFU/mL Escherichia coli (A)  Preliminary     Comment:     Possible ESBL, confirmation in progress.        Radiology Data:   Imaging Results (Last 24 Hours)       Procedure Component Value Units Date/Time    XR Chest 1 View [[id number]] Collected: 06/28/24 2115     Updated: 06/28/24 2119    Narrative:      EXAM: XR CHEST 1 VW- 6/28/2024 7:03 PM     HISTORY: SOA Triage Protocol       COMPARISON: 5/3/2024.     TECHNIQUE: Single frontal radiograph of the chest was obtained.     FINDINGS:     Support Devices: Prior median sternotomy.     Cardiac and Mediastinal Silhouettes: Normal.     Lungs/Pleura: Mild interstitial prominence. Suspected small right  pleural effusion. No visible pneumothorax.     Osseous structures: No acute osseous finding.     Other: None.       Impression:         Suspected small left pleural effusion.     Mild interstitial prominence may suggest mild edema.           This report was signed and finalized on 6/28/2024 9:16 PM by Ronal Wisdom.               I have reviewed the patient's current medications.     Assessment/Plan   Assessment  Active Hospital Problems    Diagnosis     **CHF exacerbation     CHF (congestive heart failure), NYHA class I, acute on chronic, combined      Acute on chronic heart failure  Urinary tract infection, E. 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  Small left pleural effusion.  Troponin was 51 and second 147, with a elevated proBNP 3200  Admission creatinine 2.01, today 1.88.  At his baseline.  Potassium 4.1.  Sodium 139    Initial hemoglobin 10.5, today 10.1.    He had a urine culture in the emergency department on 6/27/2024, that is growing E. coli, with possible ESBL, confirmation pending.  For this reason I  will start patient on ertapenem empirically and follow final urine culture.        Treatment Plan  Continue diuresis, patient currently on Bumex 1 mg IV every 12 hours.  Continue carvedilol 3.125 p.o. twice daily.  Continue Imdur.  Will place valsartan on hold for now.  Continue statin.      Ertapenem IV as mentioned above.  Follow urine culture    Follow renal function and electrolytes.    Continue Lantus 20 units subcutaneous at night.  Sliding scale of insulin.    Continue pregabalin as per prior use.    Lactulose for constipation.  MiraLAX as needed.    The patient is scheduled for a colonoscopy on July 1, 2024.  Will hold any anticoagulants for now.  Decision on long-term anticoagulation to be made with patient after colonoscopy.    Extremely poor historian.  Unknown home medications.  Medication reconciliation not possible with pharmacy.  I have started some medications that the patient possibly takes.      Medical Decision Making  Number and Complexity of problems: Ordering 10, moderate to high complexity  Differential Diagnosis: See above    Conditions and Status        Condition is unchanged.     MDM Data  External documents reviewed: None  Cardiac tracing (EKG, telemetry) interpretation: Atrial fibrillation  Radiology interpretation: Radiology reports reviewed  Labs reviewed: Yes  Any tests that were considered but not ordered: No     Decision rules/scores evaluated (example [id number], Wells, etc): See chart     Discussed with: Patient and nurse     Care Planning  Shared decision making: With patient  Code status and discussions: Full code    Disposition  Social Determinants of Health that impact treatment or disposition: Apparently poor compliance  I expect the patient to be discharged to home with home health in 2 days.     Electronically signed by Josué De Oliveira MD, 06/29/24, 11:26 CDT.

## 2024-06-30 LAB
25(OH)D3 SERPL-MCNC: 32.8 NG/ML (ref 30–100)
ANION GAP SERPL CALCULATED.3IONS-SCNC: 11 MMOL/L (ref 5–15)
BACTERIA SPEC AEROBE CULT: ABNORMAL
BASOPHILS # BLD AUTO: 0.05 10*3/MM3 (ref 0–0.2)
BASOPHILS NFR BLD AUTO: 0.7 % (ref 0–1.5)
BUN SERPL-MCNC: 35 MG/DL (ref 8–23)
BUN/CREAT SERPL: 18.8 (ref 7–25)
CALCIUM SPEC-SCNC: 9.1 MG/DL (ref 8.6–10.5)
CHLORIDE SERPL-SCNC: 103 MMOL/L (ref 98–107)
CO2 SERPL-SCNC: 26 MMOL/L (ref 22–29)
CREAT SERPL-MCNC: 1.86 MG/DL (ref 0.76–1.27)
CREAT UR-MCNC: 17 MG/DL
DEPRECATED RDW RBC AUTO: 47.4 FL (ref 37–54)
EGFRCR SERPLBLD CKD-EPI 2021: 38.9 ML/MIN/1.73
EOSINOPHIL # BLD AUTO: 0.44 10*3/MM3 (ref 0–0.4)
EOSINOPHIL NFR BLD AUTO: 6.5 % (ref 0.3–6.2)
ERYTHROCYTE [DISTWIDTH] IN BLOOD BY AUTOMATED COUNT: 14.7 % (ref 12.3–15.4)
GLUCOSE BLDC GLUCOMTR-MCNC: 149 MG/DL (ref 70–130)
GLUCOSE BLDC GLUCOMTR-MCNC: 193 MG/DL (ref 70–130)
GLUCOSE BLDC GLUCOMTR-MCNC: 254 MG/DL (ref 70–130)
GLUCOSE BLDC GLUCOMTR-MCNC: 281 MG/DL (ref 70–130)
GLUCOSE SERPL-MCNC: 230 MG/DL (ref 65–99)
HCT VFR BLD AUTO: 34.1 % (ref 37.5–51)
HGB BLD-MCNC: 10.6 G/DL (ref 13–17.7)
IMM GRANULOCYTES # BLD AUTO: 0.03 10*3/MM3 (ref 0–0.05)
IMM GRANULOCYTES NFR BLD AUTO: 0.4 % (ref 0–0.5)
LYMPHOCYTES # BLD AUTO: 1.56 10*3/MM3 (ref 0.7–3.1)
LYMPHOCYTES NFR BLD AUTO: 23 % (ref 19.6–45.3)
MAGNESIUM SERPL-MCNC: 2 MG/DL (ref 1.6–2.4)
MCH RBC QN AUTO: 27 PG (ref 26.6–33)
MCHC RBC AUTO-ENTMCNC: 31.1 G/DL (ref 31.5–35.7)
MCV RBC AUTO: 87 FL (ref 79–97)
MONOCYTES # BLD AUTO: 0.77 10*3/MM3 (ref 0.1–0.9)
MONOCYTES NFR BLD AUTO: 11.3 % (ref 5–12)
NEUTROPHILS NFR BLD AUTO: 3.94 10*3/MM3 (ref 1.7–7)
NEUTROPHILS NFR BLD AUTO: 58.1 % (ref 42.7–76)
NRBC BLD AUTO-RTO: 0 /100 WBC (ref 0–0.2)
PHOSPHATE SERPL-MCNC: 4.6 MG/DL (ref 2.5–4.5)
PLATELET # BLD AUTO: 238 10*3/MM3 (ref 140–450)
PMV BLD AUTO: 11.7 FL (ref 6–12)
POTASSIUM SERPL-SCNC: 3.9 MMOL/L (ref 3.5–5.2)
PTH-INTACT SERPL-MCNC: 41.7 PG/ML (ref 15–65)
RBC # BLD AUTO: 3.92 10*6/MM3 (ref 4.14–5.8)
SODIUM SERPL-SCNC: 140 MMOL/L (ref 136–145)
UUN 24H UR-MCNC: 169 MG/DL
WBC NRBC COR # BLD AUTO: 6.79 10*3/MM3 (ref 3.4–10.8)

## 2024-06-30 PROCEDURE — 82948 REAGENT STRIP/BLOOD GLUCOSE: CPT

## 2024-06-30 PROCEDURE — 96376 TX/PRO/DX INJ SAME DRUG ADON: CPT

## 2024-06-30 PROCEDURE — 25010000002 BUMETANIDE PER 0.5 MG: Performed by: FAMILY MEDICINE

## 2024-06-30 PROCEDURE — 83735 ASSAY OF MAGNESIUM: CPT | Performed by: INTERNAL MEDICINE

## 2024-06-30 PROCEDURE — 80048 BASIC METABOLIC PNL TOTAL CA: CPT | Performed by: INTERNAL MEDICINE

## 2024-06-30 PROCEDURE — 84100 ASSAY OF PHOSPHORUS: CPT | Performed by: INTERNAL MEDICINE

## 2024-06-30 PROCEDURE — 99232 SBSQ HOSP IP/OBS MODERATE 35: CPT | Performed by: INTERNAL MEDICINE

## 2024-06-30 PROCEDURE — 63710000001 INSULIN LISPRO (HUMAN) PER 5 UNITS: Performed by: HOSPITALIST

## 2024-06-30 PROCEDURE — 25010000002 ERTAPENEM PER 500 MG: Performed by: FAMILY MEDICINE

## 2024-06-30 PROCEDURE — G0378 HOSPITAL OBSERVATION PER HR: HCPCS

## 2024-06-30 PROCEDURE — 83970 ASSAY OF PARATHORMONE: CPT | Performed by: INTERNAL MEDICINE

## 2024-06-30 PROCEDURE — 82306 VITAMIN D 25 HYDROXY: CPT | Performed by: INTERNAL MEDICINE

## 2024-06-30 PROCEDURE — 85025 COMPLETE CBC W/AUTO DIFF WBC: CPT | Performed by: HOSPITALIST

## 2024-06-30 PROCEDURE — 97161 PT EVAL LOW COMPLEX 20 MIN: CPT

## 2024-06-30 RX ORDER — POLYETHYLENE GLYCOL 3350 17 G/17G
17 POWDER, FOR SOLUTION ORAL DAILY PRN
COMMUNITY

## 2024-06-30 RX ORDER — BUSPIRONE HYDROCHLORIDE 10 MG/1
10 TABLET ORAL 2 TIMES DAILY
COMMUNITY

## 2024-06-30 RX ORDER — NITROGLYCERIN 0.4 MG/1
0.4 TABLET SUBLINGUAL
COMMUNITY

## 2024-06-30 RX ADMIN — PREGABALIN 50 MG: 50 CAPSULE ORAL at 08:22

## 2024-06-30 RX ADMIN — CARVEDILOL 3.12 MG: 3.12 TABLET, FILM COATED ORAL at 08:21

## 2024-06-30 RX ADMIN — CETIRIZINE HYDROCHLORIDE 10 MG: 10 TABLET ORAL at 08:21

## 2024-06-30 RX ADMIN — POLYETHYLENE GLYCOL 3350, SODIUM SULFATE ANHYDROUS, SODIUM BICARBONATE, SODIUM CHLORIDE, POTASSIUM CHLORIDE 4000 ML: 236; 22.74; 6.74; 5.86; 2.97 POWDER, FOR SOLUTION ORAL at 13:25

## 2024-06-30 RX ADMIN — OXYCODONE HYDROCHLORIDE 10 MG: 5 TABLET ORAL at 20:54

## 2024-06-30 RX ADMIN — DULOXETINE HYDROCHLORIDE 60 MG: 30 CAPSULE, DELAYED RELEASE ORAL at 08:22

## 2024-06-30 RX ADMIN — ERTAPENEM 1000 MG: 1 INJECTION INTRAMUSCULAR; INTRAVENOUS at 11:51

## 2024-06-30 RX ADMIN — TAMSULOSIN HYDROCHLORIDE 0.4 MG: 0.4 CAPSULE ORAL at 08:21

## 2024-06-30 RX ADMIN — BUMETANIDE 1 MG: 0.25 INJECTION INTRAMUSCULAR; INTRAVENOUS at 08:22

## 2024-06-30 RX ADMIN — INSULIN LISPRO 2 UNITS: 100 INJECTION, SOLUTION INTRAVENOUS; SUBCUTANEOUS at 17:04

## 2024-06-30 RX ADMIN — ROSUVASTATIN CALCIUM 10 MG: 20 TABLET, FILM COATED ORAL at 08:21

## 2024-06-30 RX ADMIN — CALCITRIOL 0.5 MCG: 0.25 CAPSULE ORAL at 08:21

## 2024-06-30 RX ADMIN — TIMOLOL MALEATE 1 DROP: 5 SOLUTION/ DROPS OPHTHALMIC at 08:22

## 2024-06-30 RX ADMIN — INSULIN LISPRO 4 UNITS: 100 INJECTION, SOLUTION INTRAVENOUS; SUBCUTANEOUS at 08:22

## 2024-06-30 RX ADMIN — INSULIN LISPRO 4 UNITS: 100 INJECTION, SOLUTION INTRAVENOUS; SUBCUTANEOUS at 11:50

## 2024-06-30 RX ADMIN — TIMOLOL MALEATE 1 DROP: 5 SOLUTION/ DROPS OPHTHALMIC at 20:37

## 2024-06-30 RX ADMIN — LACTULOSE 20 G: 20 SOLUTION ORAL at 08:21

## 2024-06-30 RX ADMIN — CARVEDILOL 3.12 MG: 3.12 TABLET, FILM COATED ORAL at 17:04

## 2024-06-30 RX ADMIN — Medication 10 ML: at 08:22

## 2024-06-30 RX ADMIN — ISOSORBIDE MONONITRATE 30 MG: 30 TABLET, EXTENDED RELEASE ORAL at 08:21

## 2024-06-30 RX ADMIN — PANTOPRAZOLE SODIUM 40 MG: 40 TABLET, DELAYED RELEASE ORAL at 08:23

## 2024-06-30 RX ADMIN — Medication 10 ML: at 20:39

## 2024-06-30 RX ADMIN — PREGABALIN 100 MG: 100 CAPSULE ORAL at 20:37

## 2024-06-30 RX ADMIN — BUMETANIDE 1 MG: 0.25 INJECTION INTRAMUSCULAR; INTRAVENOUS at 17:04

## 2024-06-30 RX ADMIN — FLUTICASONE PROPIONATE 1 SPRAY: 50 SPRAY, METERED NASAL at 08:22

## 2024-06-30 NOTE — PLAN OF CARE
Problem: Adult Inpatient Plan of Care  Goal: Plan of Care Review  Recent Flowsheet Documentation  Taken 6/30/2024 1348 by Daniel Garcia, PT  Plan of Care Reviewed With: patient  Outcome Evaluation: PT IE complete.  A&Ox4.  C/o 7/10 abdominal pain.  Wears walking boot L foot.  BLE pitting edema noted.  Pt/spouse report able to ambulate on his own, but A needed for ADL's at baseline.  Today he is CGA sit<>stand.  Ambulated 150ft min A x1 (intermittently).  Gait is slow, decreased balance with turns, occasional scissor gait.  PT to see for progressive ambulation and strengthening.  Recommend home w/ assist at DC.  Thank you for referral.   Goal Outcome Evaluation:  Plan of Care Reviewed With: patient           Outcome Evaluation: PT IE complete.  A&Ox4.  C/o 7/10 abdominal pain.  Wears walking boot L foot.  BLE pitting edema noted.  Pt/spouse report able to ambulate on his own, but A needed for ADL's at baseline.  Today he is CGA sit<>stand.  Ambulated 150ft min A x1 (intermittently).  Gait is slow, decreased balance with turns, occasional scissor gait.  PT to see for progressive ambulation and strengthening.  Recommend home w/ assist at DC.  Thank you for referral.      Anticipated Discharge Disposition (PT): home with assist                         No

## 2024-06-30 NOTE — PROGRESS NOTES
"[facility] -  Progress Note     LOS: 2 days   Patient Care Team:  Del Shetty MD as PCP - General (Family Medicine)  Emeka Garay MD as Cardiologist (Cardiology)  Cristopher Hannah MD as Consulting Physician (Gastroenterology)  Brian Lomas MD as Consulting Physician (Nephrology)  Karel Soliz MD as Surgeon (Neurosurgery)  Willy Romero APRN as Nurse Practitioner (Nurse Practitioner)  Dougie Taylor MD as Consulting Physician (Pulmonary Disease)  Nicolasa Coello RN as Ambulatory  ([facility])    Chief Complaint: CHF Follow Up    Subjective     Interval History: Continues to complain of abdominal discomfort.  He reports this is not improved from yesterday.  He does report good urinary output.  He states his breathing is \" a little better\".  He is sitting up in the chair.  He still has some lower extremity swelling.  He denies chest pain at present.  Perhaps this chest discomfort is improved due to diuresis.       Review of Systems:     Review of Systems   Respiratory:  Positive for shortness of breath. Negative for wheezing.    Cardiovascular:  Positive for leg swelling. Negative for chest pain and palpitations.   Gastrointestinal:  Positive for abdominal pain.   Genitourinary:  Negative for difficulty urinating.   Neurological:  Negative for dizziness and tremors.   Hematological:  Bruises/bleeds easily.     Objective     Vital Sign Min/Max for last 24 hours  Temp  Min: 97.4 °F (36.3 °C)  Max: 97.7 °F (36.5 °C)   BP  Min: 104/55  Max: 130/76   Pulse  Min: 63  Max: 78   Resp  Min: 16  Max: 18   SpO2  Min: 94 %  Max: 99 %   No data recorded   Weight  Min: 129 kg (283 lb 12.8 oz)  Max: 129 kg (283 lb 12.8 oz)         06/30/24  0339   Weight: 129 kg (283 lb 12.8 oz)       Physical Exam:    Vitals reviewed.   Constitutional:       General: Awake. Not in acute distress.     Appearance: Normal appearance. Well-developed, well-groomed and not in " distress. Obese. Chronically ill-appearing.   HENT:      Head: Normocephalic and atraumatic.   Pulmonary:      Effort: Pulmonary effort is normal.      Breath sounds: No wheezing. Bibasilar Rales present.   Cardiovascular:      Normal rate. Irregularly irregular rhythm.   Edema:     Peripheral edema present.     Pretibial: bilateral 2+ edema of the pretibial area.     Ankle: bilateral 2+ edema of the ankle.  Musculoskeletal:      Cervical back: Normal range of motion and neck supple. Skin:     General: Skin is warm and dry.   Neurological:      Mental Status: Alert, oriented to person, place, and time and oriented to person, place and time.   Psychiatric:         Attention and Perception: Attention normal.         Mood and Affect: Mood normal.         Speech: Speech normal.         Behavior: Behavior normal. Behavior is cooperative.         Thought Content: Thought content normal.       Results Review:   Lab Results   Component Value Date    GLUCOSE 230 (H) 06/30/2024    CALCIUM 9.1 06/30/2024     06/30/2024    K 3.9 06/30/2024    CO2 26.0 06/30/2024     06/30/2024    BUN 35 (H) 06/30/2024    CREATININE 1.86 (H) 06/30/2024    EGFR 38.9 (L) 06/30/2024    BCR 18.8 06/30/2024    ANIONGAP 11.0 06/30/2024     Lab Results   Component Value Date    WBC 6.79 06/30/2024    HGB 10.6 (L) 06/30/2024    HCT 34.1 (L) 06/30/2024    MCV 87.0 06/30/2024     06/30/2024         Medication Review: yes  Current Facility-Administered Medications   Medication Dose Route Frequency Provider Last Rate Last Admin    acetaminophen (TYLENOL) tablet 650 mg  650 mg Oral Q4H PRN Latanya Paez MD        Or    acetaminophen (TYLENOL) 160 MG/5ML oral solution 650 mg  650 mg Oral Q4H PRN Latanya Paez MD        Or    acetaminophen (TYLENOL) suppository 650 mg  650 mg Rectal Q4H PRN Latanya Paez MD        sennosides-docusate (PERICOLACE) 8.6-50 MG per tablet 2 tablet  2 tablet Oral BID PRN Latanya Paez MD        And     polyethylene glycol (MIRALAX) packet 17 g  17 g Oral Daily PRN Latanya Paez MD   17 g at 06/29/24 1305    And    bisacodyl (DULCOLAX) EC tablet 5 mg  5 mg Oral Daily PRN Latanya Peaz MD   5 mg at 06/29/24 1304    And    bisacodyl (DULCOLAX) suppository 10 mg  10 mg Rectal Daily PRN Latanya Paez MD   10 mg at 06/29/24 1840    bumetanide (BUMEX) injection 1 mg  1 mg Intravenous BID Josué De Oliveira MD   1 mg at 06/30/24 0822    calcitriol (ROCALTROL) capsule 0.5 mcg  0.5 mcg Oral Daily Josué De Oliveira MD   0.5 mcg at 06/30/24 0821    Calcium Replacement - Follow Nurse / BPA Driven Protocol   Does not apply PRN Latanya Paez MD        carvedilol (COREG) tablet 3.125 mg  3.125 mg Oral BID With Meals Donna Roman APRN   3.125 mg at 06/30/24 0821    cetirizine (zyrTEC) tablet 10 mg  10 mg Oral Daily Josué De Oliveira MD   10 mg at 06/30/24 0821    dextrose (D50W) (25 g/50 mL) IV injection 25 g  25 g Intravenous Q15 Min PRN Latanya Paez MD        dextrose (GLUTOSE) oral gel 15 g  15 g Oral Q15 Min PRN Latanya Paez MD        DULoxetine (CYMBALTA) DR capsule 60 mg  60 mg Oral Daily Josué De Oliveira MD   60 mg at 06/30/24 0822    ertapenem (INVanz) 1,000 mg in sodium chloride 0.9 % 100 mL MBP  1,000 mg Intravenous Q24H Josué De Oliveira  mL/hr at 06/29/24 1304 1,000 mg at 06/29/24 1304    fluticasone (FLONASE) 50 MCG/ACT nasal spray 1 spray  1 spray Nasal Daily Josué De Oliveira MD   1 spray at 06/30/24 0822    glucagon (GLUCAGEN) injection 1 mg  1 mg Intramuscular Q15 Min PRN Latanya Paez MD        HYDROcodone-acetaminophen (NORCO)  MG per tablet 1 tablet  1 tablet Oral Q6H PRN Latanya Paez MD   1 tablet at 06/29/24 1840    insulin glargine (LANTUS, SEMGLEE) injection 20 Units  20 Units Subcutaneous Nightly Josué De Oliveira MD   20 Units at 06/29/24 2112    Insulin Lispro (humaLOG) injection 2-7 Units  2-7 Units Subcutaneous 4x Daily AC & at Bedtime Latanya Paez MD   4  Units at 06/30/24 0822    isosorbide mononitrate (IMDUR) 24 hr tablet 30 mg  30 mg Oral Daily Josué De Oliveira MD   30 mg at 06/30/24 0821    Magnesium Standard Dose Replacement - Follow Nurse / BPA Driven Protocol   Does not apply PRN Latanya Paez MD        nitroglycerin (NITROSTAT) SL tablet 0.4 mg  0.4 mg Sublingual Q5 Min PRN Latanya Paez MD        ondansetron (ZOFRAN) injection 4 mg  4 mg Intravenous Q6H PRN Latanya Paez MD   4 mg at 06/29/24 1714    oxyCODONE (ROXICODONE) immediate release tablet 10 mg  10 mg Oral BID PRN Latanya Paez MD   10 mg at 06/29/24 0643    pantoprazole (PROTONIX) EC tablet 40 mg  40 mg Oral QAM AC Josué De Oliveira MD   40 mg at 06/30/24 0823    Phosphorus Replacement - Follow Nurse / BPA Driven Protocol   Does not apply PRN Latanya Paez MD        Potassium Replacement - Follow Nurse / BPA Driven Protocol   Does not apply PRN Latanya Paez MD        pregabalin (LYRICA) capsule 100 mg  100 mg Oral Nightly Latanya Paez MD   100 mg at 06/29/24 2112    pregabalin (LYRICA) capsule 50 mg  50 mg Oral Daily Josué De Oliveira MD   50 mg at 06/30/24 0822    rosuvastatin (CRESTOR) tablet 10 mg  10 mg Oral Daily Josué De Oliveira MD   10 mg at 06/30/24 0821    sodium chloride 0.9 % flush 10 mL  10 mL Intravenous Q12H Latanya Paez MD   10 mL at 06/30/24 0822    sodium chloride 0.9 % flush 10 mL  10 mL Intravenous PRN Latanya Paez MD        sodium chloride 0.9 % infusion 40 mL  40 mL Intravenous PRN Latanya Paez MD        tamsulosin (FLOMAX) 24 hr capsule 0.4 mg  0.4 mg Oral Daily Josué De Oliveira MD   0.4 mg at 06/30/24 0821    timolol (TIMOPTIC) 0.5 % ophthalmic solution 1 drop  1 drop Both Eyes BID Josué De Oliveira MD   1 drop at 06/30/24 0822    [Held by provider] valsartan (DIOVAN) tablet 40 mg  40 mg Oral Daily Josué De Oliveira MD        [Held by provider] valsartan (DIOVAN) tablet 40 mg  40 mg Oral Nightly Josué De Oliveira MD         Wt Readings from  Last 3 Encounters:   06/30/24 129 kg (283 lb 12.8 oz)   06/27/24 127 kg (280 lb)   05/20/24 125 kg (275 lb 9.3 oz)       Intake/Output Summary (Last 24 hours) at 6/30/2024 0900  Last data filed at 6/29/2024 2324  Gross per 24 hour   Intake 320 ml   Output 2750 ml   Net -2430 ml       Assessment & Plan     1.  Abdominal pain  2.  Status post fall  3.  Acute on chronic diastolic heart failure: on home oral diuretics uncertain of most recent dose  4.  Coronary artery disease/prior coronary artery bypass grafting: CABG in approximately 2015.  Was previously not on aspirin due to anticoagulation with Eliquis now not on either.  Would recommend low-dose baby aspirin at a minimum if unable to resume anticoagulation due to anemia and further gastro workup.  5.  Persistent atrial fibrillation: Not chronically anticoagulated currently based off of documentation however not confirmed by the patient due to GI workup with complaints of rectal bleeding and anemia  6.  Anemia: Stable  7.  Hypertension  8.  Hyperlipidemia  9.  Diabetes mellitus, type II: on home insulin  10.  Chronic wounds: Left lower extremity  11.  CKD, stage III  12.  Obstructive sleep apnea  13.  Possible gastroparesis  14.  Chronic noncardiac chest pain  15.  Morbid obesity BMI 38.49        IV diuretics have noted good urinary response, weight loss. Patient has stable renal function in comparison to yesterday.  Continue with IV diuretics and close monitoring of renal function.  Further recommendations on oral medication adjustments pending response however it appears that the home medication list has still not been reconciled.  BMP in a.m.      Electronically signed by SUSAN Santos, 06/30/24, 8:58 AM CDT.

## 2024-06-30 NOTE — PLAN OF CARE
Goal Outcome Evaluation:  Plan of Care Reviewed With: patient, spouse        Progress: no change       Pt A/O X4, resp e/u, HRR, DYE, VSS, PPP. Pt is [place]. Pt up in chair. Wife at bedside. Pt scheduled to have colonoscopy tomorrow, bowel prep initiated. Pt's diet changed to clear liquids. Pt has Invanz IV ordered. Pt placed in isolation precautions for ESBL and e-coli in urine. Call light in reach, safety maintained.

## 2024-06-30 NOTE — THERAPY EVALUATION
Patient Name: Erick Luong  : 1955    MRN: [id number]                              Today's Date: 2024       Admit Date: 2024    Visit Dx:     ICD-10-CM ICD-9-CM   1. Chest pain with high risk for cardiac etiology  R07.9 786.50   2. Impaired mobility [Z74.09]  Z74.09 799.89     Patient Active Problem List   Diagnosis    Obesity, unspecified obesity severity, unspecified obesity type    Essential hypertension    Type 2 diabetes mellitus with hyperglycemia, with long-term current use of insulin    Nonsmoker    Anemia due to chronic kidney disease    Class 3 severe obesity due to excess calories with body mass index (BMI) of 40.0 to 44.9 in adult    Anasarca    Sleep apnea with use of continuous positive airway pressure (CPAP)    Medically noncompliant    Diabetic ulcer of left midfoot associated with type 2 diabetes mellitus, with fat layer exposed    Diabetic polyneuropathy associated with type 2 diabetes mellitus    Spinal stenosis in cervical region    Degeneration of cervical intervertebral disc    Cervical radiculopathy    Degeneration of lumbar or lumbosacral intervertebral disc    Cervical myelopathy    Bilateral carpal tunnel syndrome    CAD (coronary artery disease)    GERD without esophagitis    BPH without obstruction/lower urinary tract symptoms    Stage 3b chronic kidney disease    Chronic diastolic heart failure    Type 2 myocardial infarction due to heart failure    Left carpal tunnel syndrome    Syncope and collapse, recurrent episodes    Poorly-controlled hypertension    Rhinovirus    Peripheral vascular disease    Chronic kidney disease (CKD), stage IV (severe)    Diabetic foot infection    Sepsis    Epigastric pain    Chronic heart failure with preserved ejection fraction (HFpEF)    Sepsis due to methicillin resistant Staphylococcus aureus (MRSA) with encephalopathy without septic shock    Slow transit constipation    Diarrhea    CHF exacerbation    CHF (congestive heart failure),  NYHA class I, acute on chronic, combined     Past Medical History:   Diagnosis Date    Arthritis     Autonomic disease     CAD (coronary artery disease) 02/06/2017    Cervical radiculopathy 09/16/2021    Chronic constipation with acute exaccerbation 05/10/2021    Coronary artery disease     Degeneration of cervical intervertebral disc 08/11/2021    Diabetes mellitus     Diabetic foot ulcer 08/31/2020    Diabetic polyneuropathy associated with type 2 diabetes mellitus 01/18/2021    Elevated cholesterol     Gastroesophageal reflux disease 05/13/2019    Headache     HTN (hypertension), benign 05/03/2017    Hyperlipidemia     Hypertension     Mixed hyperlipidemia 02/07/2017    Multiple lung nodules 01/26/2020    5mm, 9 mm RLL identified 1/2020, not present 10/2019.    Myocardial infarction     Osteomyelitis 01/22/2020    Osteomyelitis of fifth toe of right foot 10/07/2019    Pancreatitis     Persistent insomnia 01/20/2020    Renal disorder     Sleep apnea 02/06/2017    Sleep apnea with use of continuous positive airway pressure (CPAP)     NON-COMPLIANT    Slow transit constipation 01/16/2019    Spinal stenosis in cervical region 09/16/2021    Vitamin D deficiency 03/02/2021     Past Surgical History:   Procedure Laterality Date    ABDOMINAL SURGERY      AMPUTATION FOOT / TOE Left 10/2021    5th digit     ANTERIOR CERVICAL DISCECTOMY W/ FUSION N/A 08/05/2022    Procedure: CERVICAL DISCECTOMY ANTERIOR WITH FUSION C5-6 with possible plating of C5-7 with neuromonitoring and 1 c-arm;  Surgeon: Karel Soliz MD;  Location:  [facility] OR;  Service: Neurosurgery;  Laterality: N/A;    APPENDECTOMY      BACK SURGERY      CARDIAC CATHETERIZATION Left 02/08/2021    Procedure: Left Heart Cath w poss intervention left anatomical snuff box acess;  Surgeon: Omkar Charles DO;  Location:  [facility] CATH INVASIVE LOCATION;  Service: Cardiology;  Laterality: Left;    CARDIAC SURGERY      CATARACT EXTRACTION      CERVICAL SPINE  SURGERY      COLONOSCOPY N/A 01/31/2017    Normal exam repeat in 5 years    COLONOSCOPY N/A 02/11/2019    Mild acute inflammation    COLONOSCOPY N/A 04/07/2024    2 areas at 10 and 20 cm with friability ulceration 2 clips placed at 20 cm and 4 clips at 10 cm poor prep normal mucosa,mild eroisions and ulcerations in visible vessels    COLONOSCOPY W/ POLYPECTOMY  03/04/2014    Hyperplastic polyp    CORONARY ARTERY BYPASS GRAFT  10/2015    ENDOSCOPY  04/13/2011    Gastritis with hemorrhage    ENDOSCOPY N/A 05/05/2017    Normal exam    ENDOSCOPY N/A 02/11/2019    Gastritis    ENDOSCOPY N/A 09/01/2020    Non-erosive gastritis with hemorrhage    ENDOSCOPY N/A 02/10/2021    Esophagitis    ENDOSCOPY N/A 04/11/2024    There were esophageal mucosal changes suspicious for short-segment Low's esophagus present in the distal esophagus. The maximum longitudinal extent of these mucosal changes was 2 cm in length. Mucosa was biopsied with a cold forceps for histologyDistal esophagus, biopsies: Mild chronic active esophagogastritis. No evidence of intestinal metaplasia, dysplasia. Antrum, bx, Mild chronic gastritis    FOOT SURGERY Left     INCISION AND DRAINAGE OF WOUND Left 09/2007    spider bite      General Information       [facility] Name 06/30/24 1341          Physical Therapy Time and Intention    Document Type evaluation  chest pain, short of breath, Dx:  AE chf  -     Mode of Treatment physical therapy  -       Row Name 06/30/24 1345          General Information    Patient Profile Reviewed yes  colonoscopy scheduled for 7.1.24.  resume POC following colonoscopy  -     Prior Level of Function independent:;all household mobility;min assist:;ADL's  DME:  wc, cane, rw.  tub  -     Existing Precautions/Restrictions fall  wears walking boot LLE  -     Barriers to Rehab previous functional deficit  -       Row Name 06/30/24 1341          Living Environment    People in Home spouse  -       Row Name 06/30/24 1308           Home Main Entrance    Number of Stairs, Main Entrance one  -     Stair Railings, Main Entrance none  -[facility] Name 06/30/24 1348          Stairs Within Home, Primary    Number of Stairs, Within Home, Primary none  -[facility] Name 06/30/24 1348          Cognition    Orientation Status (Cognition) oriented x 4  -[facility] Name 06/30/24 1348          Safety Issues, Functional Mobility    Safety Issues Affecting Function (Mobility) ability to follow commands  -     Impairments Affecting Function (Mobility) balance;endurance/activity tolerance;pain;strength;shortness of breath  -               User Key  (r) = Recorded By, (t) = Taken By, (c) = Cosigned By      Initials Name Provider Type     Daniel Garcia, PT Physical Therapist                   Mobility       [facility] Name 06/30/24 1348          Bed Mobility    Comment, (Bed Mobility) up in chair upon entering room  pt has trouble breathing laying down  -[facility] Name 06/30/24 1348          Sit-Stand Transfer    Sit-Stand [place] (Transfers) contact guard  -[facility]       Row Name 06/30/24 1348          Gait/Stairs (Locomotion)    [place] Level (Gait) minimum assist (75% patient effort)  -     Distance in Feet (Gait) 150  -     Deviations/Abnormal Patterns (Gait) gait speed decreased;scissoring  -               User Key  (r) = Recorded By, (t) = Taken By, (c) = Cosigned By      Initials Name Provider Type     Daniel Garcia, PT Physical Therapist                   Obj/Interventions       [facility] Name 06/30/24 1348          Range of Motion Comprehensive    General Range of Motion bilateral upper extremity ROM WFL;bilateral lower extremity ROM WFL  -[facility]       Row Name 06/30/24 1348          Strength Comprehensive (MMT)    General Manual Muscle Testing (MMT) Assessment upper extremity strength deficits identified;lower extremity strength deficits identified  -[facility] Name 06/30/24 1348          Lower Extremity (Manual Muscle Testing)     Lower Extremity: Manual Muscle Testing (MMT) --  B hands and feet n/t  -               User Key  (r) = Recorded By, (t) = Taken By, (c) = Cosigned By      Initials Name Provider Type     Daniel Garcia, PT Physical Therapist                   Goals/Plan       Row Name 06/30/24 1348          Bed Mobility Goal 1 (PT)    Activity/Assistive Device (Bed Mobility Goal 1, PT) bed mobility activities, all  -     [place]/Cues Needed (Bed Mobility Goal 1, PT) independent  -     Time Frame (Bed Mobility Goal 1, PT) 10 days  -     Progress/Outcomes (Bed Mobility Goal 1, PT) new goal  -       Row Name 06/30/24 1348          Transfer Goal 1 (PT)    Activity/Assistive Device (Transfer Goal 1, PT) sit-to-stand/stand-to-sit  -     [place]/Cues Needed (Transfer Goal 1, PT) independent  -     Time Frame (Transfer Goal 1, PT) 10 days  -     Progress/Outcome (Transfer Goal 1, PT) new goal  -       Row Name 06/30/24 1348          Gait Training Goal 1 (PT)    Activity/Assistive Device (Gait Training Goal 1, PT) gait (walking locomotion);decrease fall risk;diminish gait deviation  -     [place] (Gait Training Goal 1, PT) standby assist  -     Distance (Gait Training Goal 1, PT) 200ft  -     Time Frame (Gait Training Goal 1, PT) 10 days  -     Progress/Outcome (Gait Training Goal 1, PT) new goal  -       Row Name 06/30/24 1341          Stairs Goal 1 (PT)    Activity/Assistive Device (Stairs Goal 1, PT) ascending stairs;descending stairs  no handrail at home  -     [place]/Cues Needed (Stairs Goal 1, PT) contact guard required  -     Number of Stairs (Stairs Goal 1, PT) 1  -     Time Frame (Stairs Goal 1, PT) 10 days  -     Progress/Outcome (Stairs Goal 1, PT) new goal  -       Row Name 06/30/24 134          Therapy Assessment/Plan (PT)    Planned Therapy Interventions (PT) balance training;bed mobility training;gait training;home exercise  program;strengthening;patient/family education;transfer training  -               User Key  (r) = Recorded By, (t) = Taken By, (c) = Cosigned By      Initials Name Provider Type     Daniel Garcia, PT Physical Therapist                   Clinical Impression       Row Name 06/30/24 1348          Pain    Pretreatment Pain Rating 7/10  -     Posttreatment Pain Rating 7/10  -     Pain Location - abdomen  -     Pain Intervention(s) Medication (See MAR);Repositioned;Ambulation/increased activity  -       Row Name 06/30/24 1348          Plan of Care Review    Plan of Care Reviewed With patient  -     Outcome Evaluation PT IE complete.  A&Ox4.  C/o 7/10 abdominal pain.  Wears walking boot L foot.  BLE pitting edema noted.  Pt/spouse report able to ambulate on his own, but A needed for ADL's at baseline.  Today he is CGA sit<>stand.  Ambulated 150ft min A x1 (intermittently).  Gait is slow, decreased balance with turns, occasional scissor gait.  PT to see for progressive ambulation and strengthening.  Recommend home w/ assist at [place].  Thank you for referral.  -       Row Name 06/30/24 9739          Therapy Assessment/Plan (PT)    Patient/Family Therapy Goals Statement (PT) return home  -     Rehab Potential (PT) good, to achieve stated therapy goals  -     Criteria for Skilled Interventions Met (PT) yes;skilled treatment is necessary  -     Therapy Frequency (PT) 2 times/day  -     Predicted Duration of Therapy Intervention (PT) until dc  -       Row Name 06/30/24 6370          Positioning and Restraints    Pre-Treatment Position sitting in chair/recliner  -     Post Treatment Position chair  -     In Chair sitting;call light within reach;encouraged to call for assist;exit alarm on;legs elevated  -               User Key  (r) = Recorded By, (t) = Taken By, (c) = Cosigned By      Initials Name Provider Type     Daniel Garcia, PT Physical Therapist                   Outcome Measures       [facility]  Name 06/30/24 1348 06/30/24 0744       How much help from another person do you currently need...    Turning from your back to your side while in flat bed without using bedrails? 3  - 3  -MD    Moving from lying on back to sitting on the side of a flat bed without bedrails? 3  - 3  -MD    Moving to and from a bed to a chair (including a wheelchair)? 3  - 3  -MD    Standing up from a chair using your arms (e.g., wheelchair, bedside chair)? 3  - 3  -MD    Climbing 3-5 steps with a railing? 2  - 3  -MD    To walk in hospital room? 3  - 3  -MD    AM-PAC 6 Clicks Score (PT) 17  - 18  -MD    Highest Level of Mobility Goal 5 --> Static standing  - 6 --> Walk 10 steps or more  -MD      Row Name 06/30/24 1348          Functional Assessment    Outcome Measure Options AM-PAC 6 Clicks Basic Mobility (PT)  -               User Key  (r) = Recorded By, (t) = Taken By, (c) = Cosigned By      Initials Name Provider Type     Daniel Garcia, PT Physical Therapist    Neetu Salgado, RN Registered Nurse                                 Physical Therapy Education       Title: PT OT SLP Therapies (Done)       Topic: Physical Therapy (Done)       Point: Mobility training (Done)       Learning Progress Summary             Patient Acceptance, E,D, DU,VU by  at 6/30/2024 1453    Comment: benefits of PT and POC, call for A OOB                         Point: Precautions (Done)       Learning Progress Summary             Patient Acceptance, E,D, DU,VU by  at 6/30/2024 1453    Comment: benefits of PT and POC, call for A OOB                                         User Key       Initials Effective Dates Name Provider Type [facility] 02/03/23 -  Daniel Garcia, PT Physical Therapist PT                  PT Recommendation and Plan  Planned Therapy Interventions (PT): balance training, bed mobility training, gait training, home exercise program, strengthening, patient/family education, transfer training  Plan of Care  Reviewed With: patient  Outcome Evaluation: PT IE complete.  A&Ox4.  C/o 7/10 abdominal pain.  Wears walking boot L foot.  BLE pitting edema noted.  Pt/spouse report able to ambulate on his own, but A needed for ADL's at baseline.  Today he is CGA sit<>stand.  Ambulated 150ft min A x1 (intermittently).  Gait is slow, decreased balance with turns, occasional scissor gait.  PT to see for progressive ambulation and strengthening.  Recommend home w/ assist at [place].  Thank you for referral.     Time Calculation:         PT Charges       Row Name 06/30/24 1454             Time Calculation    Start Time 1348  -      Stop Time 1413  -      Time Calculation (min) 25 min  -      PT Received On 06/30/24  -      PT Goal Re-Cert Due Date 07/10/24  -         Untimed Charges    PT Eval/Re-eval Minutes 25  -         Total Minutes    Untimed Charges Total Minutes 25  -       Total Minutes 25  -                User Key  (r) = Recorded By, (t) = Taken By, (c) = Cosigned By      Initials Name Provider Type     Daniel Garcia, PT Physical Therapist                  Therapy Charges for Today       Code Description Service Date Service Provider Modifiers Qty    14930328812 HC PT EVAL LOW COMPLEXITY 2 6/30/2024 Daniel Garcia, PT GP 1            PT G-Codes  Outcome Measure Options: AM-PAC 6 Clicks Basic Mobility (PT)  AM-PAC 6 Clicks Score (PT): 17  PT Discharge Summary  Anticipated Discharge Disposition (PT): home with assist    Daniel Garcia PT  6/30/2024

## 2024-06-30 NOTE — H&P (VIEW-ONLY)
[facility] Gastroenterology  Inpatient Consult Note  Today's date:  06/30/24    Erick Luong  1955       Referring Provider: Latanya Paez MD  Primary Physician: Del Shetty MD   Primary Gastroenterologist: Dr. Hannah    Date of Admission: 6/28/2024  Date of Service:  06/30/24    Reason for Consultation/Chief Complaint: Pt request.  Scheduled for Colonoscopy on 7/1/24    History of present illness:    Patient is a 69-year-old male with history of HTN, DM, HLD and diastolic CHF who is scheduled for a colonoscopy with Dr. Hannah on 7/1/2024 to evaluate his constipation and abdominal pain, and was admitted yesterday with CHF noting new redevelopment bilateral pleural effusions.  She was evaluated by cardiology and has been receiving IV Lasix and cleared for endoscopic procedures.  Her creatinine is elevated but stable.  His colonoscopy on 4/7/2024 was to the transverse colon due to poor prep and revealed rectosigmoid ulcers with visible vessels that were clipped.  The patient was rescheduled for follow-up colonoscopy on 7/1/2024.  Since his last colonoscopy he was evaluated in the emergency room on 5/20/2024 for abdominal pain and possible rectal bleeding.  His Hgb was actually increased to 10.8 compared with 9.3 on 5/3/2024.  CT abdomen/pelvis at that time did not reveal any acute GI findings and a rectal clip was noted.  Of note that CT revealed a resolution of his pre-existing pleural effusions.  The patient reports that he has persistent abdominal pain and may have vomited a small amount of dark blood/coffee-ground appearance.  His Hgb on this admission was 10.3 and stable on follow-up with Hgb of 10.6 today.  Patient reports 3 months of early satiety with regular postprandial vomiting and regurgitation of old food his Hgb A1c was 11.2 on 5/3/2024 and has been persistently elevated at least over the last 3 years.  He rarely vomits in between meals.  EGD on 4/11/2024 revealed mild  esophagitis and gastritis.  He reports 2 weeks of obstipation despite MiraLAX and lactulose use.  CT abdomen/pelvis on 6/27/2024 reveals a colon and rectum full of stool.  Lastly he reports generalized abdominal pain after falling and hitting his abdomen on a stool.  He does feel that it is muscular in nature.         Past Medical History:   Diagnosis Date    Arthritis     Autonomic disease     CAD (coronary artery disease) 02/06/2017    Cervical radiculopathy 09/16/2021    Chronic constipation with acute exaccerbation 05/10/2021    Coronary artery disease     Degeneration of cervical intervertebral disc 08/11/2021    Diabetes mellitus     Diabetic foot ulcer 08/31/2020    Diabetic polyneuropathy associated with type 2 diabetes mellitus 01/18/2021    Elevated cholesterol     Gastroesophageal reflux disease 05/13/2019    Headache     HTN (hypertension), benign 05/03/2017    Hyperlipidemia     Hypertension     Mixed hyperlipidemia 02/07/2017    Multiple lung nodules 01/26/2020    5mm, 9 mm RLL identified 1/2020, not present 10/2019.    Myocardial infarction     Osteomyelitis 01/22/2020    Osteomyelitis of fifth toe of right foot 10/07/2019    Pancreatitis     Persistent insomnia 01/20/2020    Renal disorder     Sleep apnea 02/06/2017    Sleep apnea with use of continuous positive airway pressure (CPAP)     NON-COMPLIANT    Slow transit constipation 01/16/2019    Spinal stenosis in cervical region 09/16/2021    Vitamin D deficiency 03/02/2021       Past Surgical History:   Procedure Laterality Date    ABDOMINAL SURGERY      AMPUTATION FOOT / TOE Left 10/2021    5th digit     ANTERIOR CERVICAL DISCECTOMY W/ FUSION N/A 08/05/2022    Procedure: CERVICAL DISCECTOMY ANTERIOR WITH FUSION C5-6 with possible plating of C5-7 with neuromonitoring and 1 c-arm;  Surgeon: Karel Soliz MD;  Location: [facility];  Service: Neurosurgery;  Laterality: N/A;    APPENDECTOMY      BACK SURGERY      CARDIAC CATHETERIZATION Left  "02/08/2021    Procedure: Left Heart Cath w poss intervention left anatomical snuff box acess;  Surgeon: Omkar Charles DO;  Location:  [facility];  Service: Cardiology;  Laterality: Left;    CARDIAC SURGERY      CATARACT EXTRACTION      CERVICAL SPINE SURGERY      COLONOSCOPY N/A 01/31/2017    Normal exam repeat in 5 years    COLONOSCOPY N/A 02/11/2019    Mild acute inflammation    COLONOSCOPY N/A 04/07/2024    2 areas at 10 and 20 cm with friability ulceration 2 clips placed at 20 cm and 4 clips at 10 cm poor prep normal mucosa,mild eroisions and ulcerations in visible vessels    COLONOSCOPY W/ POLYPECTOMY  03/04/2014    Hyperplastic polyp    CORONARY ARTERY BYPASS GRAFT  10/2015    ENDOSCOPY  04/13/2011    Gastritis with hemorrhage    ENDOSCOPY N/A 05/05/2017    Normal exam    ENDOSCOPY N/A 02/11/2019    Gastritis    ENDOSCOPY N/A 09/01/2020    Non-erosive gastritis with hemorrhage    ENDOSCOPY N/A 02/10/2021    Esophagitis    ENDOSCOPY N/A 04/11/2024    There were esophageal mucosal changes suspicious for short-segment Low's esophagus present in the distal esophagus. The maximum longitudinal extent of these mucosal changes was 2 cm in length. Mucosa was biopsied with a cold forceps for histologyDistal esophagus, biopsies: Mild chronic active esophagogastritis. No evidence of intestinal metaplasia, dysplasia. Antrum, bx, Mild chronic gastritis    FOOT SURGERY Left     INCISION AND DRAINAGE OF WOUND Left 09/2007    spider bite        Allergies   Allergen Reactions    Cefepime Hives and Anaphylaxis    Bactrim [Sulfamethoxazole-Trimethoprim] Other (See Comments)     \"RENAL FAILURE\"    Vancomycin Itching    Zolpidem Mental Status Change     \"makes him crazy\"    Metronidazole Rash       Medications Prior to Admission   Medication Sig Dispense Refill Last Dose    ascorbic acid (VITAMIN C) 1000 MG tablet Take 1 tablet by mouth Daily. 30 tablet 3 Past Week    bumetanide (BUMEX) 2 MG " tablet Take 1 tablet by mouth Daily. 90 tablet 0 Past Week    busPIRone (BUSPAR) 10 MG tablet Take 1 tablet by mouth 2 (Two) Times a Day.   Past Week    calcitriol (ROCALTROL) 0.5 MCG capsule Take 1 capsule by mouth Daily. 90 capsule 4 Past Week    carvedilol (COREG) 3.125 MG tablet Take 1 tablet twice a day by oral route. 60 tablet 4 Past Week    Diclofenac Sodium (VOLTAREN) 1 % gel gel Apply 2 g topically to the appropriate area as directed 4 (Four) Times a Day As Needed. 300 g 11 Past Month    donepezil (ARICEPT) 10 MG tablet Take 1 tablet by mouth Daily. 90 tablet 1 Past Week    DULoxetine (CYMBALTA) 60 MG capsule Take 1 capsule by mouth Daily. 90 capsule 4 Past Week    famotidine (PEPCID) 20 MG tablet Take 1 tablet twice a day by oral route. 180 tablet 4 Past Week    fluticasone (FLONASE) 50 MCG/ACT nasal spray Instill 1 spray in each nostril as directed by provider Daily. 16 g 1 Past Week    Insulin Glargine (Lantus SoloStar) 100 UNIT/ML injection pen Inject 20 Units under the skin into the appropriate area as directed every night at bedtime. 15 mL 3 Past Week    Insulin Regular Human, Conc, (HumuLIN R) 500 UNIT/ML solution pen-injector CONCENTRATED injection Inject 15 Units under the skin into the appropriate area as directed 3 (Three) Times a Day Before Meals.   Past Week    Insulin Regular Human, Conc, (HumuLIN R) 500 UNIT/ML solution pen-injector CONCENTRATED injection Inject 40 Units under the skin into the appropriate area as directed 3 (Three) Times a Day Before Meals. Inject 40 units under the skin in the the appropriate area with regular meals AND 40 units with large meals.   Past Week    Iron-Vitamin C (Vitron-C)  MG tablet Take 1 tablet twice a day by oral route. 60 tablet 2 Past Week    lactulose (Enulose) 10 GM/15ML solution solution (encephalopathy) Take 30 mL by mouth 3 times a day for 30 days. 2838 mL 11 Past Week    levocetirizine (XYZAL) 5 MG tablet Take 1 tablet by mouth every day at  bedtime. 30 tablet 2 Past Week    levoFLOXacin (LEVAQUIN) 750 MG tablet Take 1 tablet by mouth Daily. 7 tablet 0 Past Week    melatonin 3 MG tablet Take 2 tablets by mouth At Night As Needed for Sleep. 30 tablet 0 Past Week    midodrine (PROAMATINE) 10 MG tablet Take 1 tablet by mouth 3 (Three) Times a Day. 270 tablet 4 Past Week    oxyCODONE (ROXICODONE) 10 MG tablet Take 1 tablet by mouth 2 (Two) Times a Day As Needed---MUST last 30 days 55 tablet 0 Past Week    pantoprazole (PROTONIX) 40 MG EC tablet Take 1 tablet by mouth Every 12 (Twelve) Hours. 180 tablet 4 Past Week    polyethylene glycol (MIRALAX) 17 GM/SCOOP powder Take 17 g by mouth Daily As Needed (constipation).   Past Week    pregabalin (LYRICA) 100 MG capsule Take 2 capsules by mouth Every Night.   Past Week    pregabalin (LYRICA) 50 MG capsule Take 1 capsule by mouth Daily.   Past Week    rosuvastatin (CRESTOR) 10 MG tablet Take 1 tablet by mouth Every Night. 30 tablet 2 Past Week    sennosides-docusate (PERICOLACE) 8.6-50 MG per tablet Take 1 tablet by mouth Every Night. Obtain OTC   Past Week    sodium hypochlorite (DAKIN'S 1/4 STRENGTH) 0.125 % solution topical solution 0.125% Apply 1 application topically to the appropriate area as directed 2 (Two) Times a Day. 473 mL 5 Past Week    sucralfate (CARAFATE) 1 g tablet Take 1 tablet by mouth 4 (Four) Times a Day before meals. 360 tablet 1 Past Week    tamsulosin (FLOMAX) 0.4 MG capsule 24 hr capsule Take 1 capsule by mouth Daily. 90 capsule 1 Past Week    valsartan (DIOVAN) 40 MG tablet Take 1 tablet by mouth Every Night. 30 tablet 2 Past Week    zinc sulfate (ZINCATE) 50 MG capsule Take 1 capsule by mouth Daily.   Past Week    nitroglycerin (NITROSTAT) 0.4 MG SL tablet Place 1 tablet under the tongue Every 5 (Five) Minutes As Needed for Chest Pain. Take no more than 3 doses in 15 minutes.   Unknown       Hospital Medications (active)         Dose Frequency Start End    acetaminophen (TYLENOL) 160  "MG/5ML oral solution 650 mg 650 mg Every 4 Hours PRN 6/28/2024 --    Admin Instructions: If given for fever, use fever parameter: fever greater than 100.4 °F  Based on patient request - if ordered for moderate or severe pain, provider allows for administration of a medication prescribed for a lower pain scale.    Do not exceed 4 grams of acetaminophen in a 24 hr period. Max dose of 2gm for AST/ALT greater than 120 units/L.    If given for pain, use the following pain scale:   Mild Pain = Pain Score of 1-3, CPOT 1-2  Moderate Pain = Pain Score of 4-6, CPOT 3-4  Severe Pain = Pain Score of 7-10, CPOT 5-8    Route: Oral    Linked Group 1: Placed in \"Or\" Linked Group        acetaminophen (TYLENOL) suppository 650 mg 650 mg Every 4 Hours PRN 6/28/2024 --    Admin Instructions: If given for fever, use fever parameter: fever greater than 100.4 °F  Based on patient request - if ordered for moderate or severe pain, provider allows for administration of a medication prescribed for a lower pain scale.    Do not exceed 4 grams of acetaminophen in a 24 hr period. Max dose of 2gm for AST/ALT greater than 120 units/L.    If given for pain, use the following pain scale:   Mild Pain = Pain Score of 1-3, CPOT 1-2  Moderate Pain = Pain Score of 4-6, CPOT 3-4  Severe Pain = Pain Score of 7-10, CPOT 5-8    Route: Rectal    Linked Group 1: Placed in \"Or\" Linked Group        acetaminophen (TYLENOL) tablet 650 mg 650 mg Every 4 Hours PRN 6/28/2024 --    Admin Instructions: If given for fever, use fever parameter: fever greater than 100.4 °F  Based on patient request - if ordered for moderate or severe pain, provider allows for administration of a medication prescribed for a lower pain scale.    Do not exceed 4 grams of acetaminophen in a 24 hr period. Max dose of 2gm for AST/ALT greater than 120 units/L.    If given for pain, use the following pain scale:   Mild Pain = Pain Score of 1-3, CPOT 1-2  Moderate Pain = Pain Score of 4-6, CPOT " "3-4  Severe Pain = Pain Score of 7-10, CPOT 5-8    Route: Oral    Linked Group 1: Placed in \"Or\" Linked Group        bisacodyl (DULCOLAX) EC tablet 5 mg 5 mg Daily PRN 6/28/2024 --    Admin Instructions: Use if no bowel movement after 12 hours.  Swallow whole. Do not crush, split, or chew tablet.    Route: Oral    Linked Group 2: Placed in \"And\" Linked Group        bisacodyl (DULCOLAX) suppository 10 mg 10 mg Daily PRN 6/28/2024 --    Admin Instructions: Use if no bowel movement after 12 hours.  Hold for diarrhea    Route: Rectal    Linked Group 2: Placed in \"And\" Linked Group        bumetanide (BUMEX) injection 1 mg 1 mg 2 Times Daily (Diuretics) 6/29/2024 --    Admin Instructions: Hold for SBP less than 100, DBP less than 60.  Give slow IV push over 1-2 minutes.    Route: Intravenous    calcitriol (ROCALTROL) capsule 0.5 mcg 0.5 mcg Daily 6/29/2024 --    Route: Oral    Calcium Replacement - Follow Nurse / BPA Driven Protocol  As Needed 6/28/2024 --    Admin Instructions: Open Order & Select \"BHS Electrolyte Replacement Protocol Algorithm\" to View Details    Route: Does not apply    carvedilol (COREG) tablet 3.125 mg 3.125 mg 2 Times Daily With Meals 6/29/2024 --    Admin Instructions: Hold for SBP less than 100, DBP less than 60, or heart rate less than 50. If a dose is held, please contact the provider.  Give with food.    Route: Oral    cetirizine (zyrTEC) tablet 10 mg 10 mg Daily 6/29/2024 --    Route: Oral    dextrose (D50W) (25 g/50 mL) IV injection 25 g 25 g Every 15 Minutes PRN 6/28/2024 --    Admin Instructions: Blood sugar less than 70; patient has IV access - Unresponsive, NPO or Unable To Safely Swallow    Route: Intravenous    dextrose (GLUTOSE) oral gel 15 g 15 g Every 15 Minutes PRN 6/28/2024 --    Admin Instructions: BS<70, Patient Alert, Is not NPO, Can safely swallow.    Route: Oral    DULoxetine (CYMBALTA) DR capsule 60 mg 60 mg Daily 6/29/2024 --    Admin Instructions: Do not crush or chew the " capsules or tablets. The drug may not work as designed if the capsule or tablet is crushed or chewed. Swallow whole.  Caution: Look alike/sound alike drug alert. Capsule may be opened and sprinkled on applesauce or apple juice. Do not crush or chew capsule.    Route: Oral    ertapenem (INVanz) 1,000 mg in sodium chloride 0.9 % 100 mL MBP 1,000 mg Every 24 Hours 6/29/2024 7/4/2024    Admin Instructions: Caution: Look alike/sound alike drug alert.    Route: Intravenous    fluticasone (FLONASE) 50 MCG/ACT nasal spray 1 spray 1 spray Daily 6/29/2024 --    Route: Nasal    glucagon (GLUCAGEN) injection 1 mg 1 mg Every 15 Minutes PRN 6/28/2024 --    Admin Instructions: Blood Glucose Less Than 70 - Patient Without IV Access - Unresponsive, NPO or Unable To Safely Swallow  Reconstitute powder for injection by adding 1 mL of -supplied sterile diluent or sterile water for injection to a vial containing 1 mg of the drug, to provide solutions containing 1 mg/mL. Shake vial gently to dissolve.    Route: Intramuscular    HYDROcodone-acetaminophen (NORCO)  MG per tablet 1 tablet 1 tablet Every 6 Hours PRN 6/29/2024 7/4/2024    Admin Instructions: Based on patient request - if ordered for moderate or severe pain, provider allows for administration of a medication prescribed for a lower pain scale.  [JOSE]    Do not exceed 4 grams of acetaminophen in a 24 hr period. Max dose of 2gm for AST/ALT greater than 120 units/L        If given for pain, use the following pain scale:   Mild Pain = Pain Score of 1-3, CPOT 1-2  Moderate Pain = Pain Score of 4-6, CPOT 3-4  Severe Pain = Pain Score of 7-10, CPOT 5-8    Route: Oral    insulin glargine (LANTUS, SEMGLEE) injection 25 Units 25 Units Nightly 6/30/2024 --    Admin Instructions:     Route: Subcutaneous    Insulin Lispro (humaLOG) injection 2-7 Units 2-7 Units 4 Times Daily Before Meals & Nightly 6/29/2024 --    Admin Instructions: Correction Insulin - Low Dose - Total  "Insulin Dose Less Than 40 units/day (Lean, Elderly or Renal Patients)    Blood Glucose 150-199 mg/dL - 2 units  Blood Glucose 200-249 mg/dL - 3 units  Blood Glucose 250-299 mg/dL - 4 units  Blood Glucose 300-349 mg/dL - 5 units  Blood Glucose 350-400 mg/dL - 6 units  Blood Glucose Greater Than 400 mg/dL - 7 units & Call Provider   Caution: Look alike/sound alike drug alert    Route: Subcutaneous    isosorbide mononitrate (IMDUR) 24 hr tablet 30 mg 30 mg Daily 6/29/2024 --    Admin Instructions: Do not crush or chew the capsules or tablets. The drug may not work as designed if the capsule or tablet is crushed or chewed. Swallow whole.    Route: Oral    Magnesium Standard Dose Replacement - Follow Nurse / BPA Driven Protocol  As Needed 6/28/2024 --    Admin Instructions: Open Order & Select \"BHS Electrolyte Replacement Protocol Algorithm\" to View Details    Route: Does not apply    nitroglycerin (NITROSTAT) SL tablet 0.4 mg 0.4 mg Every 5 Minutes PRN 6/28/2024 --    Admin Instructions: If Pain Unrelieved After 3 Doses Notify MD  May administer up to 3 doses per episode.    Route: Sublingual    ondansetron (ZOFRAN) injection 4 mg 4 mg Every 6 Hours PRN 6/28/2024 --    Admin Instructions: If BOTH ondansetron (ZOFRAN) and promethazine (PHENERGAN) are ordered use ondansetron first and THEN promethazine IF ondansetron is ineffective.    Route: Intravenous    oxyCODONE (ROXICODONE) immediate release tablet 10 mg 10 mg 2 Times Daily PRN 6/29/2024 7/4/2024    Admin Instructions: Based on patient request - if ordered for moderate or severe pain, provider allows for administration of a medication prescribed for a lower pain scale.      If given for pain, use the following pain scale:  Mild Pain = Pain Score of 1-3, CPOT 1-2  Moderate Pain = Pain Score of 4-6, CPOT 3-4  Severe Pain = Pain Score of 7-10, CPOT 5-8    Route: Oral    pantoprazole (PROTONIX) EC tablet 40 mg 40 mg Every Morning Before Breakfast 6/29/2024 --    Admin " "Instructions: Do not crush or chew the capsules or tablets. The drug may not work as designed if the capsule or tablet is crushed or chewed. Swallow whole.  Swallow whole; do not crush, split, or chew.    Route: Oral    Phosphorus Replacement - Follow Nurse / BPA Driven Protocol  As Needed 6/28/2024 --    Admin Instructions: Open Order & Select \"BHS Electrolyte Replacement Protocol Algorithm\" to View Details    Route: Does not apply    polyethylene glycol (GoLYTELY) solution 4,000 mL 4,000 mL Once 6/30/2024 --    Admin Instructions: MIX entire container.  Give 2000 mL (1/2 bottle) for first dose at bedtime.  Store the remaining solution for next dose in refrigerator (may be refigerated for 48 hours).  Give 2nd dose from same bottle at 0600 or several hours prior to procedure.  Adjust administration times as indicated.    Route: Oral    polyethylene glycol (MIRALAX) packet 17 g 17 g Daily PRN 6/28/2024 --    Admin Instructions: Use if no bowel movement after 12 hours. Mix in 6-8 ounces of water.  Use 4-8 ounces of water, tea, or juice for each 17 gram dose.    Route: Oral    Linked Group 2: Placed in \"And\" Linked Group        Potassium Replacement - Follow Nurse / BPA Driven Protocol  As Needed 6/28/2024 --    Admin Instructions: Open Order & Select \"BHS Electrolyte Replacement Protocol Algorithm\" to View Details    Route: Does not apply    pregabalin (LYRICA) capsule 100 mg 100 mg Nightly 6/29/2024 --    Admin Instructions:     Route: Oral    pregabalin (LYRICA) capsule 50 mg 50 mg Daily 6/29/2024 --    Admin Instructions:     Route: Oral    rosuvastatin (CRESTOR) tablet 10 mg 10 mg Daily 6/29/2024 --    Admin Instructions: Avoid grapefruit juice.    Route: Oral    sennosides-docusate (PERICOLACE) 8.6-50 MG per tablet 2 tablet 2 tablet 2 Times Daily PRN 6/28/2024 --    Admin Instructions: Start bowel management regimen if patient has not had a bowel movement after 12 hours.    Route: Oral    Linked Group 2: Placed " "in \"And\" Linked Group        sodium chloride 0.9 % flush 10 mL 10 mL Every 12 Hours Scheduled 2024 --    Route: Intravenous    sodium chloride 0.9 % flush 10 mL 10 mL As Needed 2024 --    Route: Intravenous    sodium chloride 0.9 % infusion 40 mL 40 mL As Needed 2024 --    Admin Instructions: Following administration of an IV intermittent medication, flush line with 40mL NS at 100mL/hr.    Route: Intravenous    tamsulosin (FLOMAX) 24 hr capsule 0.4 mg 0.4 mg Daily 2024 --    Admin Instructions: Do not crush or chew the capsules or tablets. The drug may not work as designed if the capsule or tablet is crushed or chewed. Swallow whole. If patient unable to swallow whole, contact pharmacy for alternative.    Route: Oral    timolol (TIMOPTIC) 0.5 % ophthalmic solution 1 drop 1 drop 2 Times Daily 2024 --    Route: Both Eyes    valsartan (DIOVAN) tablet 40 mg ([Held by provider] since 2024 11:25 AM) 40 mg Daily 2024 --    Admin Instructions: Hold for SBP less than 100, DBP less than 60, or heart rate less than 50. If a dose is held, please contact the provider.    Route: Oral    valsartan (DIOVAN) tablet 40 mg ([Held by provider] since 2024 11:25 AM) 40 mg Nightly 2024 --    Admin Instructions: Hold for SBP less than 100, DBP less than 60, or heart rate less than 50. If a dose is held, please contact the provider.    Route: Oral            Social History     Tobacco Use    Smoking status: Former     Current packs/day: 0.00     Types: Cigarettes     Quit date:      Years since quittin.5    Smokeless tobacco: Never    Tobacco comments:     smoked in highschool   Substance Use Topics    Alcohol use: No        Past Family History:  Family History   Problem Relation Age of Onset    Colon cancer Father     Heart disease Father     Colon cancer Sister     Colon polyps Sister     Alzheimer's disease Mother     Coronary artery disease Sister     Coronary artery disease Sister  "       Review of Systems:  12 point inspection performed with pertinent positives in HPI.    Physical Exam:  Temp:  [97.4 °F (36.3 °C)-97.7 °F (36.5 °C)] 97.5 °F (36.4 °C)  Heart Rate:  [63-78] 66  Resp:  [16-18] 16  BP: (104-130)/(55-76) 111/61  Body mass index is 38.49 kg/m².    Intake/Output Summary (Last 24 hours) at 6/30/2024 1251  Last data filed at 6/30/2024 0941  Gross per 24 hour   Intake 420 ml   Output 3500 ml   Net -3080 ml     I/O this shift:  In: 100 [P.O.:100]  Out: 750 [Urine:750]    General appearance:   HEENT: Nonicteric sclerae.  EOMI.   Lungs: Good expansion bilaterally  Heart: Regular rate and rhythm.   Abdomen: Soft, nondistended, mild generalized tenderness, no R/G.  No palpable masses.  Extremities: No cyanosis, edema or pulse deficits.  Skin: No rash or jaundice.    Results Review:  Lab Results (last 24 hours)       Procedure Component Value Units Date/Time    Vitamin D,25-Hydroxy [[id number]]  (Normal) Collected: 06/30/24 0638    Specimen: Blood Updated: 06/30/24 1211     25 Hydroxy, Vitamin D 32.8 ng/ml     Narrative:      Reference Range for Total Vitamin D 25(OH)     Deficiency <20.0 ng/mL   Insufficiency 21-29 ng/mL   Sufficiency  ng/mL  Toxicity >100 ng/ml      POC Glucose Once [[id number]]  (Abnormal) Collected: 06/30/24 1133    Specimen: Blood Updated: 06/30/24 1203     Glucose 281 mg/dL      Comment: : [id number] Sanford RenettaMeter ID: [id number]       Creatinine Urine Random (kidney function) GFR component - Urine, Clean Catch [[id number]] Collected: 06/29/24 1845    Specimen: Urine, Clean Catch Updated: 06/30/24 1149     Creatinine, Urine 17.0 mg/dL     Narrative:      Reference intervals for random urine have not been established.  Clinical usage is dependent upon physician's interpretation in combination with other laboratory tests.       Urea Nitrogen, Urine - Urine, Clean Catch [[id number]] Collected: 06/29/24 1845    Specimen: Urine, Clean Catch Updated: 06/30/24 1149      Urea Nitrogen, Urine 169 mg/dL     Narrative:      Reference intervals for random urine have not been established.  Clinical usage is dependent upon physician's interpretation in combination with other laboratory tests.       POC Glucose Once [[id number]]  (Abnormal) Collected: 06/30/24 0751    Specimen: Blood Updated: 06/30/24 0805     Glucose 254 mg/dL      Comment: : [id number] Sanford JacksonMeter ID: [id number]       PTH, Intact [[id number]]  (Normal) Collected: 06/30/24 0638    Specimen: Blood Updated: 06/30/24 0722     PTH, Intact 41.7 pg/mL     Narrative:      Results may be falsely decreased if patient taking Biotin.      Basic Metabolic Panel [[id number]]  (Abnormal) Collected: 06/30/24 0638    Specimen: Blood Updated: 06/30/24 0710     Glucose 230 mg/dL      BUN 35 mg/dL      Creatinine 1.86 mg/dL      Sodium 140 mmol/L      Potassium 3.9 mmol/L      Chloride 103 mmol/L      CO2 26.0 mmol/L      Calcium 9.1 mg/dL      BUN/Creatinine Ratio 18.8     Anion Gap 11.0 mmol/L      eGFR 38.9 mL/min/1.73     Narrative:      GFR Normal >60  Chronic Kidney Disease <60  Kidney Failure <15      Phosphorus [[id number]]  (Abnormal) Collected: 06/30/24 0638    Specimen: Blood Updated: 06/30/24 0710     Phosphorus 4.6 mg/dL     Magnesium [[id number]]  (Normal) Collected: 06/30/24 0638    Specimen: Blood Updated: 06/30/24 0710     Magnesium 2.0 mg/dL     CBC & Differential [[id number]]  (Abnormal) Collected: 06/30/24 0638    Specimen: Blood Updated: 06/30/24 0649    Narrative:      The following orders were created for panel order CBC & Differential.  Procedure                               Abnormality         Status                     ---------                               -----------         ------                     CBC Auto Differential[[id number]]        Abnormal            Final result                 Please view results for these tests on the individual orders.    CBC Auto Differential [[id number]]  (Abnormal)  Collected: 06/30/24 0638    Specimen: Blood Updated: 06/30/24 0649     WBC 6.79 10*3/mm3      RBC 3.92 10*6/mm3      Hemoglobin 10.6 g/dL      Hematocrit 34.1 %      MCV 87.0 fL      MCH 27.0 pg      MCHC 31.1 g/dL      RDW 14.7 %      RDW-SD 47.4 fl      MPV 11.7 fL      Platelets 238 10*3/mm3      Neutrophil % 58.1 %      Lymphocyte % 23.0 %      Monocyte % 11.3 %      Eosinophil % 6.5 %      Basophil % 0.7 %      Immature Grans % 0.4 %      Neutrophils, Absolute 3.94 10*3/mm3      Lymphocytes, Absolute 1.56 10*3/mm3      Monocytes, Absolute 0.77 10*3/mm3      Eosinophils, Absolute 0.44 10*3/mm3      Basophils, Absolute 0.05 10*3/mm3      Immature Grans, Absolute 0.03 10*3/mm3      nRBC 0.0 /100 WBC     POC Glucose Once [[id number]]  (Abnormal) Collected: 06/29/24 2107    Specimen: Blood Updated: 06/29/24 2118     Glucose 250 mg/dL      Comment: : [id number] Sharon WhitneyMeter ID: [id number]       Sodium, Urine, Random - Urine, Clean Catch [[id number]] Collected: 06/29/24 1845    Specimen: Urine, Clean Catch Updated: 06/29/24 1927     Sodium, Urine 120 mmol/L     Narrative:      Reference intervals for random urine have not been established.  Clinical usage is dependent upon physician's interpretation in combination with other laboratory tests.       Protein, Urine, Random - Urine, Clean Catch [[id number]] Collected: 06/29/24 1845    Specimen: Urine, Clean Catch Updated: 06/29/24 1927     Total Protein, Urine 34.9 mg/dL     Narrative:      Reference intervals for random urine have not been established.  Clinical usage is dependent upon physician's interpretation in combination with other laboratory tests.       POC Glucose Once [[id number]]  (Abnormal) Collected: 06/29/24 1617    Specimen: Blood Updated: 06/29/24 1638     Glucose 283 mg/dL      Comment: : [id number] Sanford KimMeter ID: [id number]               Radiology Review:  Imaging Results (Last 72 Hours)       Procedure Component Value Units Date/Time     XR Chest 1 View [[id number]] Collected: 06/28/24 2115     Updated: 06/28/24 2119    Narrative:      EXAM: XR CHEST 1 VW- 6/28/2024 7:03 PM     HISTORY: [facility] Triage Protocol       COMPARISON: 5/3/2024.     TECHNIQUE: Single frontal radiograph of the chest was obtained.     FINDINGS:     Support Devices: Prior median sternotomy.     Cardiac and Mediastinal Silhouettes: Normal.     Lungs/Pleura: Mild interstitial prominence. Suspected small right  pleural effusion. No visible pneumothorax.     Osseous structures: No acute osseous finding.     Other: None.       Impression:         Suspected small left pleural effusion.     Mild interstitial prominence may suggest mild edema.           This report was signed and finalized on 6/28/2024 9:16 PM by Ronal Wisdom.               Impression/Plan:    CHF exacerbation    CHF (congestive heart failure), NYHA class I, acute on chronic, combined      1.  Obstipation/last colonoscopy noting rectosigmoid ulcers most likely stercoral.  Status post endoclips for high risk stigmata.  Hgb has been stable.  No significant rectal bleeding.  Plan colonoscopy for tomorrow to follow-up last colonoscopy with poor prep.  Patient has been cleared by cardiology and is currently drinking a prep.  Hopefully he will be prepped due to his obstipation.    -Colonoscopy in a.m.      2.  Nausea/vomiting/old food/prior meals noted.  Last EGD without any significant pathology.  Poorly controlled DM with elevated Hgb A1c.    -Glucose control  -Follow-up with endocrinologist  -Consider gastric emptying scan  -Consider Reglan with bowel prep if he is unable to tolerate      3.  Pleural effusions/CHF managed by cardiology with diuretics    -Continue supportive care.  Patient cleared for endoscopic studies.    Talon Rust MD  06/30/24   12:51 CDT    DISCLAIMER:    This physician works through a [employer] as an inpatient consultant gastroenterologist only and has no outpatient clinic for  patient follow up.  Any results not available at time of inpatient discharge and/or GI clinic follow up should be managed by the hospitalist team, PCP, or outpatient gastroenterologist.

## 2024-06-30 NOTE — CONSULTS
[facility] Gastroenterology  Inpatient Consult Note  Today's date:  06/30/24    Erick Luong  1955       Referring Provider: Latanya Paez MD  Primary Physician: Del Shetty MD   Primary Gastroenterologist: Dr. Hannah    Date of Admission: 6/28/2024  Date of Service:  06/30/24    Reason for Consultation/Chief Complaint: Pt request.  Scheduled for Colonoscopy on 7/1/24    History of present illness:    Patient is a 69-year-old male with history of HTN, DM, HLD and diastolic CHF who is scheduled for a colonoscopy with Dr. Hannah on 7/1/2024 to evaluate his constipation and abdominal pain, and was admitted yesterday with CHF noting new redevelopment bilateral pleural effusions.  She was evaluated by cardiology and has been receiving IV Lasix and cleared for endoscopic procedures.  Her creatinine is elevated but stable.  His colonoscopy on 4/7/2024 was to the transverse colon due to poor prep and revealed rectosigmoid ulcers with visible vessels that were clipped.  The patient was rescheduled for follow-up colonoscopy on 7/1/2024.  Since his last colonoscopy he was evaluated in the emergency room on 5/20/2024 for abdominal pain and possible rectal bleeding.  His Hgb was actually increased to 10.8 compared with 9.3 on 5/3/2024.  CT abdomen/pelvis at that time did not reveal any acute GI findings and a rectal clip was noted.  Of note that CT revealed a resolution of his pre-existing pleural effusions.  The patient reports that he has persistent abdominal pain and may have vomited a small amount of dark blood/coffee-ground appearance.  His Hgb on this admission was 10.3 and stable on follow-up with Hgb of 10.6 today.  Patient reports 3 months of early satiety with regular postprandial vomiting and regurgitation of old food his Hgb A1c was 11.2 on 5/3/2024 and has been persistently elevated at least over the last 3 years.  He rarely vomits in between meals.  EGD on 4/11/2024 revealed mild  esophagitis and gastritis.  He reports 2 weeks of obstipation despite MiraLAX and lactulose use.  CT abdomen/pelvis on 6/27/2024 reveals a colon and rectum full of stool.  Lastly he reports generalized abdominal pain after falling and hitting his abdomen on a stool.  He does feel that it is muscular in nature.         Past Medical History:   Diagnosis Date    Arthritis     Autonomic disease     CAD (coronary artery disease) 02/06/2017    Cervical radiculopathy 09/16/2021    Chronic constipation with acute exaccerbation 05/10/2021    Coronary artery disease     Degeneration of cervical intervertebral disc 08/11/2021    Diabetes mellitus     Diabetic foot ulcer 08/31/2020    Diabetic polyneuropathy associated with type 2 diabetes mellitus 01/18/2021    Elevated cholesterol     Gastroesophageal reflux disease 05/13/2019    Headache     HTN (hypertension), benign 05/03/2017    Hyperlipidemia     Hypertension     Mixed hyperlipidemia 02/07/2017    Multiple lung nodules 01/26/2020    5mm, 9 mm RLL identified 1/2020, not present 10/2019.    Myocardial infarction     Osteomyelitis 01/22/2020    Osteomyelitis of fifth toe of right foot 10/07/2019    Pancreatitis     Persistent insomnia 01/20/2020    Renal disorder     Sleep apnea 02/06/2017    Sleep apnea with use of continuous positive airway pressure (CPAP)     NON-COMPLIANT    Slow transit constipation 01/16/2019    Spinal stenosis in cervical region 09/16/2021    Vitamin D deficiency 03/02/2021       Past Surgical History:   Procedure Laterality Date    ABDOMINAL SURGERY      AMPUTATION FOOT / TOE Left 10/2021    5th digit     ANTERIOR CERVICAL DISCECTOMY W/ FUSION N/A 08/05/2022    Procedure: CERVICAL DISCECTOMY ANTERIOR WITH FUSION C5-6 with possible plating of C5-7 with neuromonitoring and 1 c-arm;  Surgeon: Karel Soliz MD;  Location: [facility], [place];  Service: Neurosurgery;  Laterality: N/A;    APPENDECTOMY      BACK SURGERY      CARDIAC CATHETERIZATION Left  "02/08/2021    Procedure: Left Heart Cath w poss intervention left anatomical snuff box acess;  Surgeon: Omkar Charles DO;  Location:  [facility] CATH INVASIVE [facility]ATION;  Service: Cardiology;  Laterality: Left;    CARDIAC SURGERY      CATARACT EXTRACTION      CERVICAL SPINE SURGERY      COLONOSCOPY N/A 01/31/2017    Normal exam repeat in 5 years    COLONOSCOPY N/A 02/11/2019    Mild acute inflammation    COLONOSCOPY N/A 04/07/2024    2 areas at 10 and 20 cm with friability ulceration 2 clips placed at 20 cm and 4 clips at 10 cm poor prep normal mucosa,mild eroisions and ulcerations in visible vessels    COLONOSCOPY W/ POLYPECTOMY  03/04/2014    Hyperplastic polyp    CORONARY ARTERY BYPASS GRAFT  10/2015    ENDOSCOPY  04/13/2011    Gastritis with hemorrhage    ENDOSCOPY N/A 05/05/2017    Normal exam    ENDOSCOPY N/A 02/11/2019    Gastritis    ENDOSCOPY N/A 09/01/2020    Non-erosive gastritis with hemorrhage    ENDOSCOPY N/A 02/10/2021    Esophagitis    ENDOSCOPY N/A 04/11/2024    There were esophageal mucosal changes suspicious for short-segment Low's esophagus present in the distal esophagus. The maximum longitudinal extent of these mucosal changes was 2 cm in length. Mucosa was biopsied with a cold forceps for histologyDistal esophagus, biopsies: Mild chronic active esophagogastritis. No evidence of intestinal metaplasia, dysplasia. Antrum, bx, Mild chronic gastritis    FOOT SURGERY Left     INCISION AND DRAINAGE OF WOUND Left 09/2007    spider bite        Allergies   Allergen Reactions    Cefepime Hives and Anaphylaxis    Bactrim [Sulfamethoxazole-Trimethoprim] Other (See Comments)     \"RENAL FAILURE\"    Vancomycin Itching    Zolpidem Mental Status Change     \"makes him crazy\"    Metronidazole Rash       Medications Prior to Admission   Medication Sig Dispense Refill Last Dose    ascorbic acid (VITAMIN C) 1000 MG tablet Take 1 tablet by mouth Daily. 30 tablet 3 Past Week    bumetanide (BUMEX) 2 MG " tablet Take 1 tablet by mouth Daily. 90 tablet 0 Past Week    busPIRone (BUSPAR) 10 MG tablet Take 1 tablet by mouth 2 (Two) Times a Day.   Past Week    calcitriol (ROCALTROL) 0.5 MCG capsule Take 1 capsule by mouth Daily. 90 capsule 4 Past Week    carvedilol (COREG) 3.125 MG tablet Take 1 tablet twice a day by oral route. 60 tablet 4 Past Week    Diclofenac Sodium (VOLTAREN) 1 % gel gel Apply 2 g topically to the appropriate area as directed 4 (Four) Times a Day As Needed. 300 g 11 Past Month    donepezil (ARICEPT) 10 MG tablet Take 1 tablet by mouth Daily. 90 tablet 1 Past Week    DULoxetine (CYMBALTA) 60 MG capsule Take 1 capsule by mouth Daily. 90 capsule 4 Past Week    famotidine (PEPCID) 20 MG tablet Take 1 tablet twice a day by oral route. 180 tablet 4 Past Week    fluticasone (FLONASE) 50 MCG/ACT nasal spray Instill 1 spray in each nostril as directed by provider Daily. 16 g 1 Past Week    Insulin Glargine (Lantus SoloStar) 100 UNIT/ML injection pen Inject 20 Units under the skin into the appropriate area as directed every night at bedtime. 15 mL 3 Past Week    Insulin Regular Human, Conc, (HumuLIN R) 500 UNIT/ML solution pen-injector CONCENTRATED injection Inject 15 Units under the skin into the appropriate area as directed 3 (Three) Times a Day Before Meals.   Past Week    Insulin Regular Human, Conc, (HumuLIN R) 500 UNIT/ML solution pen-injector CONCENTRATED injection Inject 40 Units under the skin into the appropriate area as directed 3 (Three) Times a Day Before Meals. Inject 40 units under the skin in the the appropriate area with regular meals AND 40 units with large meals.   Past Week    Iron-Vitamin C (Vitron-C)  MG tablet Take 1 tablet twice a day by oral route. 60 tablet 2 Past Week    lactulose (Enulose) 10 GM/15ML solution solution (encephalopathy) Take 30 mL by mouth 3 times a day for 30 days. 2838 mL 11 Past Week    levocetirizine (XYZAL) 5 MG tablet Take 1 tablet by mouth every day at  bedtime. 30 tablet 2 Past Week    levoFLOXacin (LEVAQUIN) 750 MG tablet Take 1 tablet by mouth Daily. 7 tablet 0 Past Week    melatonin 3 MG tablet Take 2 tablets by mouth At Night As Needed for Sleep. 30 tablet 0 Past Week    midodrine (PROAMATINE) 10 MG tablet Take 1 tablet by mouth 3 (Three) Times a Day. 270 tablet 4 Past Week    oxyCODONE (ROXICODONE) 10 MG tablet Take 1 tablet by mouth 2 (Two) Times a Day As Needed---MUST last 30 days 55 tablet 0 Past Week    pantoprazole (PROTONIX) 40 MG EC tablet Take 1 tablet by mouth Every 12 (Twelve) Hours. 180 tablet 4 Past Week    polyethylene glycol (MIRALAX) 17 GM/SCOOP powder Take 17 g by mouth Daily As Needed (constipation).   Past Week    pregabalin (LYRICA) 100 MG capsule Take 2 capsules by mouth Every Night.   Past Week    pregabalin (LYRICA) 50 MG capsule Take 1 capsule by mouth Daily.   Past Week    rosuvastatin (CRESTOR) 10 MG tablet Take 1 tablet by mouth Every Night. 30 tablet 2 Past Week    sennosides-docusate (PERICOLACE) 8.6-50 MG per tablet Take 1 tablet by mouth Every Night. Obtain OTC   Past Week    sodium hypochlorite (DAKIN'S 1/4 STRENGTH) 0.125 % solution topical solution 0.125% Apply 1 application topically to the appropriate area as directed 2 (Two) Times a Day. 473 mL 5 Past Week    sucralfate (CARAFATE) 1 g tablet Take 1 tablet by mouth 4 (Four) Times a Day before meals. 360 tablet 1 Past Week    tamsulosin (FLOMAX) 0.4 MG capsule 24 hr capsule Take 1 capsule by mouth Daily. 90 capsule 1 Past Week    valsartan (DIOVAN) 40 MG tablet Take 1 tablet by mouth Every Night. 30 tablet 2 Past Week    zinc sulfate (ZINCATE) 50 MG capsule Take 1 capsule by mouth Daily.   Past Week    nitroglycerin (NITROSTAT) 0.4 MG SL tablet Place 1 tablet under the tongue Every 5 (Five) Minutes As Needed for Chest Pain. Take no more than 3 doses in 15 minutes.   Unknown       Hospital Medications (active)         Dose Frequency Start End    acetaminophen (TYLENOL) 160  "MG/5ML oral solution 650 mg 650 mg Every 4 Hours PRN 6/28/2024 --    Admin Instructions: If given for fever, use fever parameter: fever greater than 100.4 °F  Based on patient request - if ordered for moderate or severe pain, provider allows for administration of a medication prescribed for a lower pain scale.    Do not exceed 4 grams of acetaminophen in a 24 hr period. Max dose of 2gm for AST/ALT greater than 120 units/L.    If given for pain, use the following pain scale:   Mild Pain = Pain Score of 1-3, CPOT 1-2  Moderate Pain = Pain Score of 4-6, CPOT 3-4  Severe Pain = Pain Score of 7-10, CPOT 5-8    Route: Oral    Linked Group 1: Placed in \"Or\" Linked Group        acetaminophen (TYLENOL) suppository 650 mg 650 mg Every 4 Hours PRN 6/28/2024 --    Admin Instructions: If given for fever, use fever parameter: fever greater than 100.4 °F  Based on patient request - if ordered for moderate or severe pain, provider allows for administration of a medication prescribed for a lower pain scale.    Do not exceed 4 grams of acetaminophen in a 24 hr period. Max dose of 2gm for AST/ALT greater than 120 units/L.    If given for pain, use the following pain scale:   Mild Pain = Pain Score of 1-3, CPOT 1-2  Moderate Pain = Pain Score of 4-6, CPOT 3-4  Severe Pain = Pain Score of 7-10, CPOT 5-8    Route: Rectal    Linked Group 1: Placed in \"Or\" Linked Group        acetaminophen (TYLENOL) tablet 650 mg 650 mg Every 4 Hours PRN 6/28/2024 --    Admin Instructions: If given for fever, use fever parameter: fever greater than 100.4 °F  Based on patient request - if ordered for moderate or severe pain, provider allows for administration of a medication prescribed for a lower pain scale.    Do not exceed 4 grams of acetaminophen in a 24 hr period. Max dose of 2gm for AST/ALT greater than 120 units/L.    If given for pain, use the following pain scale:   Mild Pain = Pain Score of 1-3, CPOT 1-2  Moderate Pain = Pain Score of 4-6, CPOT " "3-4  Severe Pain = Pain Score of 7-10, CPOT 5-8    Route: Oral    Linked Group 1: Placed in \"Or\" Linked Group        bisacodyl (DULCOLAX) EC tablet 5 mg 5 mg Daily PRN 6/28/2024 --    Admin Instructions: Use if no bowel movement after 12 hours.  Swallow whole. Do not crush, split, or chew tablet.    Route: Oral    Linked Group 2: Placed in \"And\" Linked Group        bisacodyl (DULCOLAX) suppository 10 mg 10 mg Daily PRN 6/28/2024 --    Admin Instructions: Use if no bowel movement after 12 hours.  Hold for diarrhea    Route: Rectal    Linked Group 2: Placed in \"And\" Linked Group        bumetanide (BUMEX) injection 1 mg 1 mg 2 Times Daily (Diuretics) 6/29/2024 --    Admin Instructions: Hold for SBP less than 100, DBP less than 60.  Give slow IV push over 1-2 minutes.    Route: Intravenous    calcitriol (ROCALTROL) capsule 0.5 mcg 0.5 mcg Daily 6/29/2024 --    Route: Oral    Calcium Replacement - Follow Nurse / BPA Driven Protocol  As Needed 6/28/2024 --    Admin Instructions: Open Order & Select \"BHS Electrolyte Replacement Protocol Algorithm\" to View Details    Route: Does not apply    carvedilol (COREG) tablet 3.125 mg 3.125 mg 2 Times Daily With Meals 6/29/2024 --    Admin Instructions: Hold for SBP less than 100, DBP less than 60, or heart rate less than 50. If a dose is held, please contact the provider.  Give with food.    Route: Oral    cetirizine (zyrTEC) tablet 10 mg 10 mg Daily 6/29/2024 --    Route: Oral    dextrose (D50W) (25 g/50 mL) IV injection 25 g 25 g Every 15 Minutes PRN 6/28/2024 --    Admin Instructions: Blood sugar less than 70; patient has IV access - Unresponsive, NPO or Unable To Safely Swallow    Route: Intravenous    dextrose (GLUTOSE) oral gel 15 g 15 g Every 15 Minutes PRN 6/28/2024 --    Admin Instructions: BS<70, Patient Alert, Is not NPO, Can safely swallow.    Route: Oral    DULoxetine (CYMBALTA) DR capsule 60 mg 60 mg Daily 6/29/2024 --    Admin Instructions: Do not crush or chew the " capsules or tablets. The drug may not work as designed if the capsule or tablet is crushed or chewed. Swallow whole.  Caution: Look alike/sound alike drug alert. Capsule may be opened and sprinkled on applesauce or apple juice. Do not crush or chew capsule.    Route: Oral    ertapenem (INVanz) 1,000 mg in sodium chloride 0.9 % 100 mL MBP 1,000 mg Every 24 Hours 6/29/2024 7/4/2024    Admin Instructions: Caution: Look alike/sound alike drug alert.    Route: Intravenous    fluticasone (FLONASE) 50 MCG/ACT nasal spray 1 spray 1 spray Daily 6/29/2024 --    Route: Nasal    glucagon (GLUCAGEN) injection 1 mg 1 mg Every 15 Minutes PRN 6/28/2024 --    Admin Instructions: Blood Glucose Less Than 70 - Patient Without IV Access - Unresponsive, NPO or Unable To Safely Swallow  Reconstitute powder for injection by adding 1 mL of -supplied sterile diluent or sterile water for injection to a vial containing 1 mg of the drug, to provide solutions containing 1 mg/mL. Shake vial gently to dissolve.    Route: Intramuscular    HYDROcodone-acetaminophen (NORCO)  MG per tablet 1 tablet 1 tablet Every 6 Hours PRN 6/29/2024 7/4/2024    Admin Instructions: Based on patient request - if ordered for moderate or severe pain, provider allows for administration of a medication prescribed for a lower pain scale.  [JOSE]    Do not exceed 4 grams of acetaminophen in a 24 hr period. Max dose of 2gm for AST/ALT greater than 120 units/L        If given for pain, use the following pain scale:   Mild Pain = Pain Score of 1-3, CPOT 1-2  Moderate Pain = Pain Score of 4-6, CPOT 3-4  Severe Pain = Pain Score of 7-10, CPOT 5-8    Route: Oral    insulin glargine (LANTUS, SEMGLEE) injection 25 Units 25 Units Nightly 6/30/2024 --    Admin Instructions:     Route: Subcutaneous    Insulin Lispro (humaLOG) injection 2-7 Units 2-7 Units 4 Times Daily Before Meals & Nightly 6/29/2024 --    Admin Instructions: Correction Insulin - Low Dose - Total  "Insulin Dose Less Than 40 units/day (Lean, Elderly or Renal Patients)    Blood Glucose 150-199 mg/dL - 2 units  Blood Glucose 200-249 mg/dL - 3 units  Blood Glucose 250-299 mg/dL - 4 units  Blood Glucose 300-349 mg/dL - 5 units  Blood Glucose 350-400 mg/dL - 6 units  Blood Glucose Greater Than 400 mg/dL - 7 units & Call Provider   Caution: Look alike/sound alike drug alert    Route: Subcutaneous    isosorbide mononitrate (IMDUR) 24 hr tablet 30 mg 30 mg Daily 6/29/2024 --    Admin Instructions: Do not crush or chew the capsules or tablets. The drug may not work as designed if the capsule or tablet is crushed or chewed. Swallow whole.    Route: Oral    Magnesium Standard Dose Replacement - Follow Nurse / BPA Driven Protocol  As Needed 6/28/2024 --    Admin Instructions: Open Order & Select \"BHS Electrolyte Replacement Protocol Algorithm\" to View Details    Route: Does not apply    nitroglycerin (NITROSTAT) SL tablet 0.4 mg 0.4 mg Every 5 Minutes PRN 6/28/2024 --    Admin Instructions: If Pain Unrelieved After 3 Doses Notify MD  May administer up to 3 doses per episode.    Route: Sublingual    ondansetron (ZOFRAN) injection 4 mg 4 mg Every 6 Hours PRN 6/28/2024 --    Admin Instructions: If BOTH ondansetron (ZOFRAN) and promethazine (PHENERGAN) are ordered use ondansetron first and THEN promethazine IF ondansetron is ineffective.    Route: Intravenous    oxyCODONE (ROXICODONE) immediate release tablet 10 mg 10 mg 2 Times Daily PRN 6/29/2024 7/4/2024    Admin Instructions: Based on patient request - if ordered for moderate or severe pain, provider allows for administration of a medication prescribed for a lower pain scale.      If given for pain, use the following pain scale:  Mild Pain = Pain Score of 1-3, CPOT 1-2  Moderate Pain = Pain Score of 4-6, CPOT 3-4  Severe Pain = Pain Score of 7-10, CPOT 5-8    Route: Oral    pantoprazole (PROTONIX) EC tablet 40 mg 40 mg Every Morning Before Breakfast 6/29/2024 --    Admin " "Instructions: Do not crush or chew the capsules or tablets. The drug may not work as designed if the capsule or tablet is crushed or chewed. Swallow whole.  Swallow whole; do not crush, split, or chew.    Route: Oral    Phosphorus Replacement - Follow Nurse / BPA Driven Protocol  As Needed 6/28/2024 --    Admin Instructions: Open Order & Select \"BHS Electrolyte Replacement Protocol Algorithm\" to View Details    Route: Does not apply    polyethylene glycol (GoLYTELY) solution 4,000 mL 4,000 mL Once 6/30/2024 --    Admin Instructions: MIX entire container.  Give 2000 mL (1/2 bottle) for first dose at bedtime.  Store the remaining solution for next dose in refrigerator (may be refigerated for 48 hours).  Give 2nd dose from same bottle at 0600 or several hours prior to procedure.  Adjust administration times as indicated.    Route: Oral    polyethylene glycol (MIRALAX) packet 17 g 17 g Daily PRN 6/28/2024 --    Admin Instructions: Use if no bowel movement after 12 hours. Mix in 6-8 ounces of water.  Use 4-8 ounces of water, tea, or juice for each 17 gram dose.    Route: Oral    Linked Group 2: Placed in \"And\" Linked Group        Potassium Replacement - Follow Nurse / BPA Driven Protocol  As Needed 6/28/2024 --    Admin Instructions: Open Order & Select \"BHS Electrolyte Replacement Protocol Algorithm\" to View Details    Route: Does not apply    pregabalin (LYRICA) capsule 100 mg 100 mg Nightly 6/29/2024 --    Admin Instructions:     Route: Oral    pregabalin (LYRICA) capsule 50 mg 50 mg Daily 6/29/2024 --    Admin Instructions:     Route: Oral    rosuvastatin (CRESTOR) tablet 10 mg 10 mg Daily 6/29/2024 --    Admin Instructions: Avoid grapefruit juice.    Route: Oral    sennosides-docusate (PERICOLACE) 8.6-50 MG per tablet 2 tablet 2 tablet 2 Times Daily PRN 6/28/2024 --    Admin Instructions: Start bowel management regimen if patient has not had a bowel movement after 12 hours.    Route: Oral    Linked Group 2: Placed " "in \"And\" Linked Group        sodium chloride 0.9 % flush 10 mL 10 mL Every 12 Hours Scheduled 2024 --    Route: Intravenous    sodium chloride 0.9 % flush 10 mL 10 mL As Needed 2024 --    Route: Intravenous    sodium chloride 0.9 % infusion 40 mL 40 mL As Needed 2024 --    Admin Instructions: Following administration of an IV intermittent medication, flush line with 40mL NS at 100mL/hr.    Route: Intravenous    tamsulosin (FLOMAX) 24 hr capsule 0.4 mg 0.4 mg Daily 2024 --    Admin Instructions: Do not crush or chew the capsules or tablets. The drug may not work as designed if the capsule or tablet is crushed or chewed. Swallow whole. If patient unable to swallow whole, contact pharmacy for alternative.    Route: Oral    timolol (TIMOPTIC) 0.5 % ophthalmic solution 1 drop 1 drop 2 Times Daily 2024 --    Route: Both Eyes    valsartan (DIOVAN) tablet 40 mg ([Held by provider] since 2024 11:25 AM) 40 mg Daily 2024 --    Admin Instructions: Hold for SBP less than 100, DBP less than 60, or heart rate less than 50. If a dose is held, please contact the provider.    Route: Oral    valsartan (DIOVAN) tablet 40 mg ([Held by provider] since 2024 11:25 AM) 40 mg Nightly 2024 --    Admin Instructions: Hold for SBP less than 100, DBP less than 60, or heart rate less than 50. If a dose is held, please contact the provider.    Route: Oral            Social History     Tobacco Use    Smoking status: Former     Current packs/day: 0.00     Types: Cigarettes     Quit date:      Years since quittin.5    Smokeless tobacco: Never    Tobacco comments:     smoked in highschool   Substance Use Topics    Alcohol use: No        Past Family History:  Family History   Problem Relation Age of Onset    Colon cancer Father     Heart disease Father     Colon cancer Sister     Colon polyps Sister     Alzheimer's disease Mother     Coronary artery disease Sister     Coronary artery disease Sister  "       Review of Systems:  12 point inspection performed with pertinent positives in HPI.    Physical Exam:  Temp:  [97.4 °F (36.3 °C)-97.7 °F (36.5 °C)] 97.5 °F (36.4 °C)  Heart Rate:  [63-78] 66  Resp:  [16-18] 16  BP: (104-130)/(55-76) 111/61  Body mass index is 38.49 kg/m².    Intake/Output Summary (Last 24 hours) at 6/30/2024 1251  Last data filed at 6/30/2024 0941  Gross per 24 hour   Intake 420 ml   Output 3500 ml   Net -3080 ml     I/O this shift:  In: 100 [P.O.:100]  Out: 750 [Urine:750]    General appearance:   HEENT: Nonicteric sclerae.  EOMI.   Lungs: Good expansion bilaterally  Heart: Regular rate and rhythm.   Abdomen: Soft, nondistended, mild generalized tenderness, no R/G.  No palpable masses.  Extremities: No cyanosis, edema or pulse deficits.  Skin: No rash or jaundice.    Results Review:  Lab Results (last 24 hours)       Procedure Component Value Units Date/Time    Vitamin D,25-Hydroxy [[id number]]  (Normal) Collected: 06/30/24 0638    Specimen: Blood Updated: 06/30/24 1211     25 Hydroxy, Vitamin D 32.8 ng/ml     Narrative:      Reference Range for Total Vitamin D 25(OH)     Deficiency <20.0 ng/mL   Insufficiency 21-29 ng/mL   Sufficiency  ng/mL  Toxicity >100 ng/ml      POC Glucose Once [[id number]]  (Abnormal) Collected: 06/30/24 1133    Specimen: Blood Updated: 06/30/24 1203     Glucose 281 mg/dL      Comment: : [id number] San[facility] RenettaMeter ID: [id number]       Creatinine Urine Random (kidney function) GFR component - Urine, Clean Catch [[id number]] Collected: 06/29/24 1845    Specimen: Urine, Clean Catch Updated: 06/30/24 1149     Creatinine, Urine 17.0 mg/dL     Narrative:      Reference intervals for random urine have not been established.  Clinical usage is dependent upon physician's interpretation in combination with other laboratory tests.       Urea Nitrogen, Urine - Urine, Clean Catch [[id number]] Collected: 06/29/24 1845    Specimen: Urine, Clean Catch Updated: 06/30/24 1149      Urea Nitrogen, Urine 169 mg/dL     Narrative:      Reference intervals for random urine have not been established.  Clinical usage is dependent upon physician's interpretation in combination with other laboratory tests.       POC Glucose Once [[id number]]  (Abnormal) Collected: 06/30/24 0751    Specimen: Blood Updated: 06/30/24 0805     Glucose 254 mg/dL      Comment: : [id number] Sanford JacksonMeter ID: [id number]       PTH, Intact [[id number]]  (Normal) Collected: 06/30/24 0638    Specimen: Blood Updated: 06/30/24 0722     PTH, Intact 41.7 pg/mL     Narrative:      Results may be falsely decreased if patient taking Biotin.      Basic Metabolic Panel [[id number]]  (Abnormal) Collected: 06/30/24 0638    Specimen: Blood Updated: 06/30/24 0710     Glucose 230 mg/dL      BUN 35 mg/dL      Creatinine 1.86 mg/dL      Sodium 140 mmol/L      Potassium 3.9 mmol/L      Chloride 103 mmol/L      CO2 26.0 mmol/L      Calcium 9.1 mg/dL      BUN/Creatinine Ratio 18.8     Anion Gap 11.0 mmol/L      eGFR 38.9 mL/min/1.73     Narrative:      GFR Normal >60  Chronic Kidney Disease <60  Kidney Failure <15      Phosphorus [[id number]]  (Abnormal) Collected: 06/30/24 0638    Specimen: Blood Updated: 06/30/24 0710     Phosphorus 4.6 mg/dL     Magnesium [[id number]]  (Normal) Collected: 06/30/24 0638    Specimen: Blood Updated: 06/30/24 0710     Magnesium 2.0 mg/dL     CBC & Differential [[id number]]  (Abnormal) Collected: 06/30/24 0638    Specimen: Blood Updated: 06/30/24 0649    Narrative:      The following orders were created for panel order CBC & Differential.  Procedure                               Abnormality         Status                     ---------                               -----------         ------                     CBC Auto Differential[[id number]]        Abnormal            Final result                 Please view results for these tests on the individual orders.    CBC Auto Differential [[id number]]  (Abnormal)  Collected: 06/30/24 0638    Specimen: Blood Updated: 06/30/24 0649     WBC 6.79 10*3/mm3      RBC 3.92 10*6/mm3      Hemoglobin 10.6 g/dL      Hematocrit 34.1 %      MCV 87.0 fL      MCH 27.0 pg      MCHC 31.1 g/dL      RDW 14.7 %      RDW-SD 47.4 fl      MPV 11.7 fL      Platelets 238 10*3/mm3      Neutrophil % 58.1 %      Lymphocyte % 23.0 %      Monocyte % 11.3 %      Eosinophil % 6.5 %      Basophil % 0.7 %      Immature Grans % 0.4 %      Neutrophils, Absolute 3.94 10*3/mm3      Lymphocytes, Absolute 1.56 10*3/mm3      Monocytes, Absolute 0.77 10*3/mm3      Eosinophils, Absolute 0.44 10*3/mm3      Basophils, Absolute 0.05 10*3/mm3      Immature Grans, Absolute 0.03 10*3/mm3      nRBC 0.0 /100 WBC     POC Glucose Once [[id number]]  (Abnormal) Collected: 06/29/24 2107    Specimen: Blood Updated: 06/29/24 2118     Glucose 250 mg/dL      Comment: : [id number] Paulina WhitneyMeter ID: [id number]       Sodium, Urine, Random - Urine, Clean Catch [[id number]] Collected: 06/29/24 1845    Specimen: Urine, Clean Catch Updated: 06/29/24 1927     Sodium, Urine 120 mmol/L     Narrative:      Reference intervals for random urine have not been established.  Clinical usage is dependent upon physician's interpretation in combination with other laboratory tests.       Protein, Urine, Random - Urine, Clean Catch [[id number]] Collected: 06/29/24 1845    Specimen: Urine, Clean Catch Updated: 06/29/24 1927     Total Protein, Urine 34.9 mg/dL     Narrative:      Reference intervals for random urine have not been established.  Clinical usage is dependent upon physician's interpretation in combination with other laboratory tests.       POC Glucose Once [[id number]]  (Abnormal) Collected: 06/29/24 1617    Specimen: Blood Updated: 06/29/24 1638     Glucose 283 mg/dL      Comment: : [id number] Sanford KimMeter ID: [id number]               Radiology Review:  Imaging Results (Last 72 Hours)       Procedure Component Value Units Date/Time     XR Chest 1 View [[id number]] Collected: 06/28/24 2115     Updated: 06/28/24 2119    Narrative:      EXAM: XR CHEST 1 VW- 6/28/2024 7:03 PM     HISTORY: [facility] Triage Protocol       COMPARISON: 5/3/2024.     TECHNIQUE: Single frontal radiograph of the chest was obtained.     FINDINGS:     Support Devices: Prior median sternotomy.     Cardiac and Mediastinal Silhouettes: Normal.     Lungs/Pleura: Mild interstitial prominence. Suspected small right  pleural effusion. No visible pneumothorax.     Osseous structures: No acute osseous finding.     Other: None.       Impression:         Suspected small left pleural effusion.     Mild interstitial prominence may suggest mild edema.           This report was signed and finalized on 6/28/2024 9:16 PM by Ronal Wisdom.               Impression/Plan:    CHF exacerbation    CHF (congestive heart failure), NYHA class I, acute on chronic, combined      1.  Obstipation/last colonoscopy noting rectosigmoid ulcers most likely stercoral.  Status post endoclips for high risk stigmata.  Hgb has been stable.  No significant rectal bleeding.  Plan colonoscopy for tomorrow to follow-up last colonoscopy with poor prep.  Patient has been cleared by cardiology and is currently drinking a prep.  Hopefully he will be prepped due to his obstipation.    -Colonoscopy in a.m.      2.  Nausea/vomiting/old food/prior meals noted.  Last EGD without any significant pathology.  Poorly controlled DM with elevated Hgb A1c.    -Glucose control  -Follow-up with endocrinologist  -Consider gastric emptying scan  -Consider Reglan with bowel prep if he is unable to tolerate      3.  Pleural effusions/CHF managed by cardiology with diuretics    -Continue supportive care.  Patient cleared for endoscopic studies.    Talon Rust MD  06/30/24   12:51 CDT    DISCLAIMER:    This physician works through a [employer] as an inpatient consultant gastroenterologist only and has no outpatient clinic for  patient follow up.  Any results not available at time of inpatient discharge and/or GI clinic follow up should be managed by the hospitalist team, PCP, or outpatient gastroenterologist.

## 2024-06-30 NOTE — PROGRESS NOTES
[facility] Medicine Services  INPATIENT PROGRESS NOTE    Patient Name: Erick Luong  Date of Admission: 6/28/2024  Today's Date: 06/30/24  Length of Stay: 2  Primary Care Physician: Del Shetty MD    Subjective   Chief Complaint: Pain, shortness of breath  Shortness of Breath    Constipation    Illness       69-year-old male with history of diabetes mellitus type 2, atrial fibrillation, coronary artery disease, prior diabetic foot ulcer, diabetic polyneuropathy, hypertension, hyperlipidemia, osteomyelitis of the foot, insomnia, chronic kidney disease stage, obstructive sleep apnea, poor compliance with CPAP, cervical spine stenosis, vitamin D deficiency, admitted last night with multiple complaints.  See history and physical.  Patient is a very poor historian, does not know name of medications that he is taking.  There is no family member present at the time of my evaluation.    Patient feels better.  Reports no chest pain.  No abdominal pain reported today.  Shortness of breath has improved, edema has decreased.  No fevers reported.        Review of Systems   Respiratory:  Positive for shortness of breath.    Gastrointestinal:  Positive for constipation.      All pertinent negatives and positives are as above. All other systems have been reviewed and are negative unless otherwise stated.     Objective    Temp:  [97.4 °F (36.3 °C)-97.7 °F (36.5 °C)] 97.5 °F (36.4 °C)  Heart Rate:  [63-78] 63  Resp:  [16-18] 16  BP: (104-130)/(55-76) 117/60  Physical Exam  Constitutional:       Appearance: Sitting in chair.  No respiratory distress.  No tachypnea.  Alert, oriented x 3.  HENT:      Head: Normocephalic and atraumatic.      Nose: Nose normal.      Mouth/Throat:      Mouth: Mucous membranes are moist.      Pharynx: Oropharynx is clear.   Eyes:      Extraocular Movements: Extraocular movements intact.      Conjunctiva/sclera: Conjunctivae normal.      Pupils: Pupils are equal,  round, and reactive to light.   Cardiovascular:      Rate and Rhythm: irregular rhythm.      Pulses: Normal pulses.   Pulmonary:      Effort: No respiratory distress.      Breath sounds: Decreased breath sounds in both bases, scant Rales.  No wheezing  Abdominal:      General: Abdomen is obese.  Bowel sounds are normal.      Palpations: Abdomen is soft.      Tenderness: There is no guarding or rebound.   Musculoskeletal:         General: Normal range of motion.      Cervical back: Normal range of motion and neck supple.   Extremities: Edema lower extremities 2+.  Pitting  Skin:     Capillary Refill: Capillary refill takes less than 2 seconds.      Coloration: Skin is not jaundiced.      Findings: No rash.   Neurological:      General: No focal deficit present.      Mental Status: Patient is alert, oriented to place time and person.     Sensory: No sensory deficit.      Motor: No weakness.      Coordination: Coordination normal.   Psychiatric:         Mood and Affect: Mood normal.         Behavior: Behavior normal.           Results Review:  I have reviewed the labs, radiology results, and diagnostic studies.    Laboratory Data:   Results from last 7 days   Lab Units 06/30/24  0638 06/29/24  0850 06/28/24 2009   WBC 10*3/mm3 6.79 7.68 10.18   HEMOGLOBIN g/dL 10.6* 10.1* 10.5*   HEMATOCRIT % 34.1* 32.5* 33.5*   PLATELETS 10*3/mm3 238 215 252        Results from last 7 days   Lab Units 06/30/24  0638 06/29/24  0850 06/28/24 2009 06/27/24  0612   SODIUM mmol/L 140 139 140 140   POTASSIUM mmol/L 3.9 4.1 4.2 4.3   CHLORIDE mmol/L 103 103 104 106   CO2 mmol/L 26.0 22.0 23.0 22.0   BUN mg/dL 35* 34* 32* 31*   CREATININE mg/dL 1.86* 1.88* 2.01* 1.86*   CALCIUM mg/dL 9.1 9.1 9.1 9.0   BILIRUBIN mg/dL  --   --  0.3 0.4   ALK PHOS U/L  --   --  108 108   ALT (SGPT) U/L  --   --  9 10   AST (SGOT) U/L  --   --  14 12   GLUCOSE mg/dL 230* 334* 177* 394*       Culture Data:   Urine Culture   Date Value Ref Range Status    06/27/2024 >100,000 CFU/mL Escherichia coli (A)  Preliminary     Comment:     Possible ESBL, confirmation in progress.       Radiology Data:   Imaging Results (Last 24 Hours)       ** No results found for the last 24 hours. **            I have reviewed the patient's current medications.     Assessment/Plan   Assessment  Active Hospital Problems    Diagnosis     **CHF exacerbation     CHF (congestive heart failure), NYHA class I, acute on chronic, combined      Acute on chronic heart failure  Urinary tract infection, E. 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  Small left pleural effusion.  Troponin was 51 and second 47, with a elevated proBNP 3200  Admission creatinine 2.01, today 1.86.  At his baseline.  Potassium 4.1.  Sodium 139    Initial hemoglobin 10.5, today 10.6.    He had a urine culture in the emergency department on 6/27/2024, that is growing E. coli, with possible ESBL, confirmation pending.        Treatment Plan  Continue diuresis, patient currently on Bumex 1 mg IV every 12 hours.  Continue carvedilol 3.125 p.o. twice daily.  Continue Imdur; valsartan on hold for now.  Continue statin.      Ertapenem IV started 6/20/2024, day #2.  Follow urine culture final report.    Follow renal function and electrolytes.    Continue Lantus, will increase to 25 units subcutaneous at bedtime.  Sliding scale of insulin.    Continue pregabalin as per prior use.    Patient has received several laxatives with no bowel movement, reports no bowel movements for 2 weeks.  Fleet enema today.  Again asked patient today about episodes of rectal bleeding and he states that  his anticoagulation was discontinued approximately 2 months ago due to bleeding but he has not had any more episodes of bleeding since then.    The patient is scheduled for a colonoscopy on July 1, 2024.  Will hold any anticoagulants for now.  Decision on long-term anticoagulation to be made with patient after colonoscopy.  Gastroenterology evaluation was ordered due to patient and family member's request.    The patient is stable from the cardiac standpoint and there are no absolute  contraindications for colonoscopy planned.  Discussed with cardiologist.      Medical Decision Making  Number and Complexity of problems: Ordering 10, moderate to high complexity  Differential Diagnosis: See above    Conditions and Status        Condition is unchanged.     MDM Data  External documents reviewed: None  Cardiac tracing (EKG, telemetry) interpretation: Atrial fibrillation  Radiology interpretation: Radiology reports reviewed  Labs reviewed: Yes  Any tests that were considered but not ordered: No     Decision rules/scores evaluated (example [id number], Wells, etc): See chart     Discussed with: Patient and nurse     Care Planning  Shared decision making: With patient  Code status and discussions: Full code    Disposition  Social Determinants of Health that impact treatment or disposition: Apparently poor compliance  I expect the patient to be discharged to home with home health in 2 days.     Electronically signed by Josué De Oliveira MD, 06/30/24, 11:16 CDT.

## 2024-06-30 NOTE — PLAN OF CARE
Goal Outcome Evaluation:      Alert and oriented x4. IV antibiotics continue. VSS. Safety maintained.

## 2024-07-01 ENCOUNTER — TELEPHONE (OUTPATIENT)
Dept: GASTROENTEROLOGY | Facility: CLINIC | Age: 69
End: 2024-07-01
Payer: COMMERCIAL

## 2024-07-01 ENCOUNTER — ANESTHESIA (OUTPATIENT)
Dept: GASTROENTEROLOGY | Facility: HOSPITAL | Age: 69
End: 2024-07-01
Payer: COMMERCIAL

## 2024-07-01 ENCOUNTER — ANESTHESIA EVENT (OUTPATIENT)
Dept: GASTROENTEROLOGY | Facility: HOSPITAL | Age: 69
End: 2024-07-01
Payer: COMMERCIAL

## 2024-07-01 PROBLEM — K62.5 RECTAL BLEEDING: Status: ACTIVE | Noted: 2024-03-26

## 2024-07-01 LAB
ANION GAP SERPL CALCULATED.3IONS-SCNC: 12 MMOL/L (ref 5–15)
BUN SERPL-MCNC: 32 MG/DL (ref 8–23)
BUN/CREAT SERPL: 19 (ref 7–25)
CALCIUM SPEC-SCNC: 8.8 MG/DL (ref 8.6–10.5)
CHLORIDE SERPL-SCNC: 99 MMOL/L (ref 98–107)
CO2 SERPL-SCNC: 28 MMOL/L (ref 22–29)
CREAT SERPL-MCNC: 1.68 MG/DL (ref 0.76–1.27)
EGFRCR SERPLBLD CKD-EPI 2021: 44 ML/MIN/1.73
GLUCOSE BLDC GLUCOMTR-MCNC: 162 MG/DL (ref 70–130)
GLUCOSE BLDC GLUCOMTR-MCNC: 168 MG/DL (ref 70–130)
GLUCOSE BLDC GLUCOMTR-MCNC: 170 MG/DL (ref 70–130)
GLUCOSE BLDC GLUCOMTR-MCNC: 215 MG/DL (ref 70–130)
GLUCOSE SERPL-MCNC: 151 MG/DL (ref 65–99)
POTASSIUM SERPL-SCNC: 3.5 MMOL/L (ref 3.5–5.2)
SODIUM SERPL-SCNC: 139 MMOL/L (ref 136–145)

## 2024-07-01 PROCEDURE — 25010000002 ERTAPENEM PER 500 MG: Performed by: FAMILY MEDICINE

## 2024-07-01 PROCEDURE — 97110 THERAPEUTIC EXERCISES: CPT

## 2024-07-01 PROCEDURE — G0378 HOSPITAL OBSERVATION PER HR: HCPCS

## 2024-07-01 PROCEDURE — 63710000001 INSULIN GLARGINE PER 5 UNITS: Performed by: FAMILY MEDICINE

## 2024-07-01 PROCEDURE — 25010000002 BUMETANIDE PER 0.5 MG: Performed by: FAMILY MEDICINE

## 2024-07-01 PROCEDURE — 25010000002 PROPOFOL 10 MG/ML EMULSION

## 2024-07-01 PROCEDURE — 97116 GAIT TRAINING THERAPY: CPT

## 2024-07-01 PROCEDURE — 82948 REAGENT STRIP/BLOOD GLUCOSE: CPT

## 2024-07-01 PROCEDURE — 63710000001 INSULIN LISPRO (HUMAN) PER 5 UNITS: Performed by: HOSPITALIST

## 2024-07-01 PROCEDURE — 80048 BASIC METABOLIC PNL TOTAL CA: CPT | Performed by: INTERNAL MEDICINE

## 2024-07-01 PROCEDURE — 96376 TX/PRO/DX INJ SAME DRUG ADON: CPT

## 2024-07-01 PROCEDURE — 45330 DIAGNOSTIC SIGMOIDOSCOPY: CPT | Performed by: INTERNAL MEDICINE

## 2024-07-01 PROCEDURE — 25810000003 SODIUM CHLORIDE 0.9 % SOLUTION: Performed by: NURSE ANESTHETIST, CERTIFIED REGISTERED

## 2024-07-01 RX ORDER — LIDOCAINE HYDROCHLORIDE 20 MG/ML
INJECTION, SOLUTION EPIDURAL; INFILTRATION; INTRACAUDAL; PERINEURAL AS NEEDED
Status: DISCONTINUED | OUTPATIENT
Start: 2024-07-01 | End: 2024-07-01 | Stop reason: SURG

## 2024-07-01 RX ORDER — SODIUM CHLORIDE 0.9 % (FLUSH) 0.9 %
10 SYRINGE (ML) INJECTION AS NEEDED
Status: DISCONTINUED | OUTPATIENT
Start: 2024-07-01 | End: 2024-07-01 | Stop reason: HOSPADM

## 2024-07-01 RX ORDER — PROPOFOL 10 MG/ML
VIAL (ML) INTRAVENOUS AS NEEDED
Status: DISCONTINUED | OUTPATIENT
Start: 2024-07-01 | End: 2024-07-01 | Stop reason: SURG

## 2024-07-01 RX ORDER — SODIUM CHLORIDE 9 MG/ML
500 INJECTION, SOLUTION INTRAVENOUS CONTINUOUS PRN
Status: DISCONTINUED | OUTPATIENT
Start: 2024-07-01 | End: 2024-07-03 | Stop reason: HOSPADM

## 2024-07-01 RX ORDER — POTASSIUM CHLORIDE 750 MG/1
40 CAPSULE, EXTENDED RELEASE ORAL ONCE
Status: COMPLETED | OUTPATIENT
Start: 2024-07-01 | End: 2024-07-01

## 2024-07-01 RX ORDER — LIDOCAINE HYDROCHLORIDE 10 MG/ML
0.5 INJECTION, SOLUTION EPIDURAL; INFILTRATION; INTRACAUDAL; PERINEURAL ONCE AS NEEDED
Status: DISCONTINUED | OUTPATIENT
Start: 2024-07-01 | End: 2024-07-01 | Stop reason: HOSPADM

## 2024-07-01 RX ORDER — BUSPIRONE HYDROCHLORIDE 10 MG/1
10 TABLET ORAL 2 TIMES DAILY
Status: DISCONTINUED | OUTPATIENT
Start: 2024-07-01 | End: 2024-07-03 | Stop reason: HOSPADM

## 2024-07-01 RX ADMIN — PROPOFOL 20 MG: 10 INJECTION, EMULSION INTRAVENOUS at 12:09

## 2024-07-01 RX ADMIN — FLUTICASONE PROPIONATE 1 SPRAY: 50 SPRAY, METERED NASAL at 09:58

## 2024-07-01 RX ADMIN — BUSPIRONE HYDROCHLORIDE 10 MG: 10 TABLET ORAL at 13:10

## 2024-07-01 RX ADMIN — BUMETANIDE 1 MG: 0.25 INJECTION INTRAMUSCULAR; INTRAVENOUS at 08:52

## 2024-07-01 RX ADMIN — CARVEDILOL 3.12 MG: 3.12 TABLET, FILM COATED ORAL at 17:48

## 2024-07-01 RX ADMIN — ERTAPENEM 1000 MG: 1 INJECTION INTRAMUSCULAR; INTRAVENOUS at 13:10

## 2024-07-01 RX ADMIN — SODIUM CHLORIDE 500 ML: 0.9 INJECTION, SOLUTION INTRAVENOUS at 11:55

## 2024-07-01 RX ADMIN — INSULIN LISPRO 2 UNITS: 100 INJECTION, SOLUTION INTRAVENOUS; SUBCUTANEOUS at 08:52

## 2024-07-01 RX ADMIN — Medication 10 ML: at 10:01

## 2024-07-01 RX ADMIN — TIMOLOL MALEATE 1 DROP: 5 SOLUTION/ DROPS OPHTHALMIC at 20:17

## 2024-07-01 RX ADMIN — POTASSIUM CHLORIDE 40 MEQ: 750 CAPSULE, EXTENDED RELEASE ORAL at 13:10

## 2024-07-01 RX ADMIN — PROPOFOL 50 MG: 10 INJECTION, EMULSION INTRAVENOUS at 12:08

## 2024-07-01 RX ADMIN — CETIRIZINE HYDROCHLORIDE 10 MG: 10 TABLET ORAL at 12:59

## 2024-07-01 RX ADMIN — BUSPIRONE HYDROCHLORIDE 10 MG: 10 TABLET ORAL at 20:28

## 2024-07-01 RX ADMIN — ROSUVASTATIN CALCIUM 10 MG: 20 TABLET, FILM COATED ORAL at 13:00

## 2024-07-01 RX ADMIN — TIMOLOL MALEATE 1 DROP: 5 SOLUTION/ DROPS OPHTHALMIC at 09:58

## 2024-07-01 RX ADMIN — HYDROCODONE BITARTRATE AND ACETAMINOPHEN 1 TABLET: 10; 325 TABLET ORAL at 20:28

## 2024-07-01 RX ADMIN — PREGABALIN 100 MG: 100 CAPSULE ORAL at 20:17

## 2024-07-01 RX ADMIN — PREGABALIN 50 MG: 50 CAPSULE ORAL at 13:00

## 2024-07-01 RX ADMIN — INSULIN LISPRO 2 UNITS: 100 INJECTION, SOLUTION INTRAVENOUS; SUBCUTANEOUS at 13:17

## 2024-07-01 RX ADMIN — TAMSULOSIN HYDROCHLORIDE 0.4 MG: 0.4 CAPSULE ORAL at 13:00

## 2024-07-01 RX ADMIN — INSULIN GLARGINE 25 UNITS: 100 INJECTION, SOLUTION SUBCUTANEOUS at 20:21

## 2024-07-01 RX ADMIN — CALCITRIOL 0.5 MCG: 0.25 CAPSULE ORAL at 12:58

## 2024-07-01 RX ADMIN — DULOXETINE HYDROCHLORIDE 60 MG: 30 CAPSULE, DELAYED RELEASE ORAL at 12:59

## 2024-07-01 RX ADMIN — BUMETANIDE 1 MG: 0.25 INJECTION INTRAMUSCULAR; INTRAVENOUS at 17:47

## 2024-07-01 RX ADMIN — Medication 10 ML: at 20:18

## 2024-07-01 RX ADMIN — INSULIN LISPRO 3 UNITS: 100 INJECTION, SOLUTION INTRAVENOUS; SUBCUTANEOUS at 20:28

## 2024-07-01 RX ADMIN — INSULIN LISPRO 2 UNITS: 100 INJECTION, SOLUTION INTRAVENOUS; SUBCUTANEOUS at 17:47

## 2024-07-01 RX ADMIN — LIDOCAINE HYDROCHLORIDE 100 MG: 20 INJECTION, SOLUTION EPIDURAL; INFILTRATION; INTRACAUDAL; PERINEURAL at 12:08

## 2024-07-01 RX ADMIN — ISOSORBIDE MONONITRATE 30 MG: 30 TABLET, EXTENDED RELEASE ORAL at 13:00

## 2024-07-01 RX ADMIN — POLYETHYLENE GLYCOL 3350, SODIUM SULFATE ANHYDROUS, SODIUM BICARBONATE, SODIUM CHLORIDE, POTASSIUM CHLORIDE 3000 ML: 236; 22.74; 6.74; 5.86; 2.97 POWDER, FOR SOLUTION ORAL at 17:47

## 2024-07-01 NOTE — PROGRESS NOTES
[facility] Medicine Services  INPATIENT PROGRESS NOTE    Patient Name: Erick Luong  Date of Admission: 6/28/2024  Today's Date: 07/01/24  Length of Stay: 3  Primary Care Physician: Del Shetty MD    Subjective   Chief Complaint: Pain, shortness of breath     69-year-old male with history of diabetes mellitus type 2, atrial fibrillation, coronary artery disease, prior diabetic foot ulcer, diabetic polyneuropathy, hypertension, hyperlipidemia, osteomyelitis of the foot, insomnia, chronic kidney disease stage, obstructive sleep apnea, poor compliance with CPAP, cervical spine stenosis, vitamin D deficiency, admitted last night with multiple complaints.  See history and physical.  Patient is a very poor historian, does not know name of medications that he is taking.  There is no family member present at the time of my evaluation.    Patient feels better.  Reports no chest pain.  No abdominal pain reported today.  Shortness of breath has improved, edema has decreased.  No fevers reported.    Again asked patient today about episodes of rectal bleeding and he states that his anticoagulation was discontinued approximately 2 months ago due to bleeding but he has not had any more episodes of bleeding since then.    Started bowel preparation yesterday, had bowel movements but still believes has more to come.        Review of Systems   Respiratory:  Positive for shortness of breath.    Gastrointestinal:  Positive for constipation.      All pertinent negatives and positives are as above. All other systems have been reviewed and are negative unless otherwise stated.     Objective    Temp:  [97.5 °F (36.4 °C)-98 °F (36.7 °C)] 97.5 °F (36.4 °C)  Heart Rate:  [63-67] 67  Resp:  [16-18] 18  BP: (108-129)/(59-64) 129/64  Physical Exam  Constitutional:       Appearance: Sitting in chair.  No respiratory distress.  No tachypnea.  Alert, oriented x 3.  HENT:      Head: Normocephalic and  atraumatic.      Nose: Nose normal.      Mouth/Throat:      Mouth: Mucous membranes are moist.      Pharynx: Oropharynx is clear.   Eyes:      Extraocular Movements: Extraocular movements intact.      Conjunctiva/sclera: Conjunctivae normal.      Pupils: Pupils are equal, round, and reactive to light.   Cardiovascular:      Rate and Rhythm: irregular rhythm.      Pulses: Normal pulses.   Pulmonary:      Effort: No respiratory distress.      Breath sounds: Decreased breath sounds in both bases. No wheezing  Abdominal:      General: Abdomen is obese.  Bowel sounds are normal.      Palpations: Abdomen is soft.      Tenderness: There is no guarding or rebound.   Musculoskeletal:         General: Normal range of motion.      Cervical back: Normal range of motion and neck supple.   Extremities: Edema lower extremities 1-2+.  Pitting  Skin:     Capillary Refill: Capillary refill takes less than 2 seconds.      Coloration: Skin is not jaundiced.      Findings: No rash.   Neurological:      General: No focal deficit present.      Mental Status: Patient is alert, oriented to place time and person.     Sensory: No sensory deficit.      Motor: No weakness.      Coordination: Coordination normal.   Psychiatric:         Mood and Affect: Mood normal.         Behavior: Behavior normal.           Results Review:  I have reviewed the labs, radiology results, and diagnostic studies.    Laboratory Data:   Results from last 7 days   Lab Units 06/30/24  0638 06/29/24  0850 06/28/24 2009   WBC 10*3/mm3 6.79 7.68 10.18   HEMOGLOBIN g/dL 10.6* 10.1* 10.5*   HEMATOCRIT % 34.1* 32.5* 33.5*   PLATELETS 10*3/mm3 238 215 252        Results from last 7 days   Lab Units 07/01/24  0659 06/30/24  0638 06/29/24  0850 06/28/24 2009 06/27/24  0612   SODIUM mmol/L 139 140 139 140 140   POTASSIUM mmol/L 3.5 3.9 4.1 4.2 4.3   CHLORIDE mmol/L 99 103 103 104 106   CO2 mmol/L 28.0 26.0 22.0 23.0 22.0   BUN mg/dL 32* 35* 34* 32* 31*   CREATININE mg/dL 1.68*  1.86* 1.88* 2.01* 1.86*   CALCIUM mg/dL 8.8 9.1 9.1 9.1 9.0   BILIRUBIN mg/dL  --   --   --  0.3 0.4   ALK PHOS U/L  --   --   --  108 108   ALT (SGPT) U/L  --   --   --  9 10   AST (SGOT) U/L  --   --   --  14 12   GLUCOSE mg/dL 151* 230* 334* 177* 394*       Culture Data:   Urine Culture   Date Value Ref Range Status   06/27/2024 >100,000 CFU/mL Escherichia coli (A)  Preliminary     Comment:     Possible ESBL, confirmation in progress.       Radiology Data:   Imaging Results (Last 24 Hours)       ** No results found for the last 24 hours. **            I have reviewed the patient's current medications.     Assessment/Plan   Assessment  Active Hospital Problems    Diagnosis     **CHF exacerbation     CHF (congestive heart failure), NYHA class I, acute on chronic, combined     Slow transit constipation     Diarrhea     Rectal bleeding     Anemia due to chronic kidney disease      Acute on chronic heart failure  Urinary tract infection, E. 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  Small left pleural effusion.  Troponin was 51 and second 47, with a elevated proBNP 3200  Admission creatinine 2.01, today 1.68.   Potassium 3,5.  Sodium 139  Glucose controlled 151.    Initial hemoglobin 10.5, today 10.6.    He had a urine culture in the emergency department on 6/27/2024, E. coli, with possible ESBL, confirmed       Treatment Plan  Continue diuresis, patient currently on Bumex 1 mg IV every 12 hours.  Continue carvedilol 3.125 p.o. twice daily.  Continue Imdur; valsartan on hold for now.  Continue statin.      Ertapenem IV started 6/20/2024, day #3.     Follow renal  function and electrolytes.  Replace potassium.    Continue Lantus 25 units subcutaneous at bedtime.  Sliding scale of insulin.    Continue pregabalin as per prior use.    No rectal bleeding.  Patient had bowel movements with bowel preparation started yesterday..    The patient had been scheduled for a colonoscopy on July 1, 2024.  Will hold any anticoagulants for now.  Decision on long-term anticoagulation to be made with patient after colonoscopy.  Gastroenterology evaluation took place.    The patient is stable from the cardiac standpoint and there are no absolute  contraindications for colonoscopy planned.  Discussed with cardiologist.      Medical Decision Making  Number and Complexity of problems: Ordering 10, moderate to high complexity  Differential Diagnosis: See above    Conditions and Status        Condition is unchanged.     MDM Data  External documents reviewed: None  Cardiac tracing (EKG, telemetry) interpretation: Atrial fibrillation  Radiology interpretation: Radiology reports reviewed  Labs reviewed: Yes  Any tests that were considered but not ordered: No     Decision rules/scores evaluated (example [id number], Wells, etc): See chart     Discussed with: Patient and nurse     Care Planning  Shared decision making: With patient  Code status and discussions: Full code    Disposition  Social Determinants of Health that impact treatment or disposition: Apparently poor compliance  I expect the patient to be discharged to home with home health in 2 days.     Electronically signed by Josué De Oliveira MD, 07/01/24, 09:39 CDT.

## 2024-07-01 NOTE — ANESTHESIA PREPROCEDURE EVALUATION
Anesthesia Evaluation     no history of anesthetic complications:   NPO Solid Status: > 8 hours             Airway   Mallampati: III  TM distance: >3 FB  No difficulty expected  Dental      Pulmonary    (+) ,sleep apnea  (-) asthma, not a smoker  Cardiovascular     (+) hypertension, past MI , CAD, CABG, CHF , PVD, hyperlipidemia  (-) pacemaker, cardiac stents    ROS comment: Echo 04/24:  ·  The study is technically difficult for diagnosis.  If there is concern for possible infective endocarditis, recommend transesophageal echocardiogram to better visualize the valves.  ·  Left ventricular systolic function is normal. Left ventricular ejection fraction appears to be 61 - 65%.  ·  Left ventricular wall thickness is consistent with mild concentric hypertrophy  ·  Left ventricular diastolic function is consistent with (grade III w/high LAP) fixed restrictive pattern.  ·  Mildly reduced right ventricular systolic function noted.  ·  The left atrial cavity is mildly dilated.  ·  There is mild calcification of the aortic valve. No aortic valve regurgitation is present. No hemodynamically significant aortic valve stenosis is present.      Neuro/Psych  (-) seizures, TIA, CVA  GI/Hepatic/Renal/Endo    (+) obesity, GERD, GI bleeding , renal disease- CRI, diabetes mellitus type 2 using insulin  (-) liver disease    Musculoskeletal     Abdominal    Substance History      OB/GYN          Other   arthritis, blood dyscrasia anemia,                   Anesthesia Plan    ASA 3     MAC     intravenous induction     Anesthetic plan, risks, benefits, and alternatives have been provided, discussed and informed consent has been obtained with: patient.    CODE STATUS:    Level Of Support Discussed With: Patient  Code Status (Patient has no pulse and is not breathing): CPR (Attempt to Resuscitate)  Medical Interventions (Patient has pulse or is breathing): Full Support

## 2024-07-01 NOTE — PROGRESS NOTES
Nephrology ([facility]) Progress Note      Patient:  Erick Luong  YOB: 1955  Date of Service: 7/1/2024  MRN: [id number]   Acct: [id number]   Primary Care Physician: Del Shetty MD  Advance Directive:   Code Status and Medical Interventions:   Ordered at: 06/30/24 1119     Level Of Support Discussed With:    Patient     Code Status (Patient has no pulse and is not breathing):    CPR (Attempt to Resuscitate)     Medical Interventions (Patient has pulse or is breathing):    Full Support     Admit Date: 6/28/2024       Hospital Day: 3  Referring Provider: Latanya Paez MD      Patient personally seen and examined.  Complete chart including Consults, Notes, Operative Reports, Labs, Cardiology, and Radiology studies reviewed as able.        Subjective:  Erick Luong is a 69 y.o. male for whom we were consulted for evaluation and treatment of chronic kidney disease stage IIIb.  He has stage IIIb chronic kidney disease baseline and follows with Dr. Lomas in the office as he has diabetic nephropathy.  He has a history of insulin-dependent type 2 diabetes, hypertension, abdominal obesity, obstructive sleep apnea, diabetic foot ulcer, Vitamin D Deficiency and coronary artery disease.  He had multiple complaints over the last week including no bowel movement x 2 weeks, several falls at home, cystitis, GI bleed with planned colonoscopy coming up on Monday and increasing peripheral edema.  Also had some diffuse abdominal pain from the falls.  Denied hematuria, dysuria, hemoptysis, rash.  Did have bruising and scratches from the falls.  Seen with family for initial evaluation.     Today, no overnight events.  Up in chair with feet elevated.  Tolerating diet.  Denied chest pain.  He is on liquid diet and receiving colonoscopy preparation.  His repeat serum creatinine is 1.68.    Allergies:  Cefepime, Bactrim [sulfamethoxazole-trimethoprim], Vancomycin, Zolpidem, and  Metronidazole    Home Meds:  Medications Prior to Admission   Medication Sig Dispense Refill Last Dose    ascorbic acid (VITAMIN C) 1000 MG tablet Take 1 tablet by mouth Daily. 30 tablet 3 Past Week    bumetanide (BUMEX) 2 MG tablet Take 1 tablet by mouth Daily. 90 tablet 0 Past Week    busPIRone (BUSPAR) 10 MG tablet Take 1 tablet by mouth 2 (Two) Times a Day.   Past Week    calcitriol (ROCALTROL) 0.5 MCG capsule Take 1 capsule by mouth Daily. 90 capsule 4 Past Week    carvedilol (COREG) 3.125 MG tablet Take 1 tablet twice a day by oral route. 60 tablet 4 Past Week    Diclofenac Sodium (VOLTAREN) 1 % gel gel Apply 2 g topically to the appropriate area as directed 4 (Four) Times a Day As Needed. 300 g 11 Past Month    donepezil (ARICEPT) 10 MG tablet Take 1 tablet by mouth Daily. 90 tablet 1 Past Week    DULoxetine (CYMBALTA) 60 MG capsule Take 1 capsule by mouth Daily. 90 capsule 4 Past Week    famotidine (PEPCID) 20 MG tablet Take 1 tablet twice a day by oral route. 180 tablet 4 Past Week    fluticasone (FLONASE) 50 MCG/ACT nasal spray Instill 1 spray in each nostril as directed by provider Daily. 16 g 1 Past Week    Insulin Glargine (Lantus SoloStar) 100 UNIT/ML injection pen Inject 20 Units under the skin into the appropriate area as directed every night at bedtime. 15 mL 3 Past Week    Insulin Regular Human, Conc, (HumuLIN R) 500 UNIT/ML solution pen-injector CONCENTRATED injection Inject 15 Units under the skin into the appropriate area as directed 3 (Three) Times a Day Before Meals.   Past Week    Insulin Regular Human, Conc, (HumuLIN R) 500 UNIT/ML solution pen-injector CONCENTRATED injection Inject 40 Units under the skin into the appropriate area as directed 3 (Three) Times a Day Before Meals. Inject 40 units under the skin in the the appropriate area with regular meals AND 40 units with large meals.   Past Week    Iron-Vitamin C (Vitron-C)  MG tablet Take 1 tablet twice a day by oral route. 60  tablet 2 Past Week    lactulose (Enulose) 10 GM/15ML solution solution (encephalopathy) Take 30 mL by mouth 3 times a day for 30 days. 2838 mL 11 Past Week    levocetirizine (XYZAL) 5 MG tablet Take 1 tablet by mouth every day at bedtime. 30 tablet 2 Past Week    levoFLOXacin (LEVAQUIN) 750 MG tablet Take 1 tablet by mouth Daily. 7 tablet 0 Past Week    melatonin 3 MG tablet Take 2 tablets by mouth At Night As Needed for Sleep. 30 tablet 0 Past Week    midodrine (PROAMATINE) 10 MG tablet Take 1 tablet by mouth 3 (Three) Times a Day. 270 tablet 4 Past Week    oxyCODONE (ROXICODONE) 10 MG tablet Take 1 tablet by mouth 2 (Two) Times a Day As Needed---MUST last 30 days 55 tablet 0 Past Week    pantoprazole (PROTONIX) 40 MG EC tablet Take 1 tablet by mouth Every 12 (Twelve) Hours. 180 tablet 4 Past Week    polyethylene glycol (MIRALAX) 17 GM/SCOOP powder Take 17 g by mouth Daily As Needed (constipation).   Past Week    pregabalin (LYRICA) 100 MG capsule Take 2 capsules by mouth Every Night.   Past Week    pregabalin (LYRICA) 50 MG capsule Take 1 capsule by mouth Daily.   Past Week    rosuvastatin (CRESTOR) 10 MG tablet Take 1 tablet by mouth Every Night. 30 tablet 2 Past Week    sennosides-docusate (PERICOLACE) 8.6-50 MG per tablet Take 1 tablet by mouth Every Night. Obtain OTC   Past Week    sodium hypochlorite (DAKIN'S 1/4 STRENGTH) 0.125 % solution topical solution 0.125% Apply 1 application topically to the appropriate area as directed 2 (Two) Times a Day. 473 mL 5 Past Week    sucralfate (CARAFATE) 1 g tablet Take 1 tablet by mouth 4 (Four) Times a Day before meals. 360 tablet 1 Past Week    tamsulosin (FLOMAX) 0.4 MG capsule 24 hr capsule Take 1 capsule by mouth Daily. 90 capsule 1 Past Week    valsartan (DIOVAN) 40 MG tablet Take 1 tablet by mouth Every Night. 30 tablet 2 Past Week    zinc sulfate (ZINCATE) 50 MG capsule Take 1 capsule by mouth Daily.   Past Week    nitroglycerin (NITROSTAT) 0.4 MG SL tablet Place  1 tablet under the tongue Every 5 (Five) Minutes As Needed for Chest Pain. Take no more than 3 doses in 15 minutes.   Unknown       Medicines:  Current Facility-Administered Medications   Medication Dose Route Frequency Provider Last Rate Last Admin    acetaminophen (TYLENOL) tablet 650 mg  650 mg Oral Q4H PRN Latanya Paez MD        Or    acetaminophen (TYLENOL) 160 MG/5ML oral solution 650 mg  650 mg Oral Q4H PRN Latanya Paez MD        Or    acetaminophen (TYLENOL) suppository 650 mg  650 mg Rectal Q4H PRN Latanya Paez MD        sennosides-docusate (PERICOLACE) 8.6-50 MG per tablet 2 tablet  2 tablet Oral BID PRN Latanya Paez MD        And    polyethylene glycol (MIRALAX) packet 17 g  17 g Oral Daily PRN Latanya Paez MD   17 g at 06/29/24 1305    And    bisacodyl (DULCOLAX) EC tablet 5 mg  5 mg Oral Daily PRN Latanya Paez MD   5 mg at 06/29/24 1304    And    bisacodyl (DULCOLAX) suppository 10 mg  10 mg Rectal Daily PRN Latanya Paez MD   10 mg at 06/29/24 1840    bumetanide (BUMEX) injection 1 mg  1 mg Intravenous BID Josué De Oliveira MD   1 mg at 07/01/24 0852    busPIRone (BUSPAR) tablet 10 mg  10 mg Oral BID Josué De Oliveira MD   10 mg at 07/01/24 1310    calcitriol (ROCALTROL) capsule 0.5 mcg  0.5 mcg Oral Daily Josué De Oliveira MD   0.5 mcg at 07/01/24 1258    Calcium Replacement - Follow Nurse / BPA Driven Protocol   Does not apply PRN Latanya Paez MD        carvedilol (COREG) tablet 3.125 mg  3.125 mg Oral BID With Meals Donna Roman APRN   3.125 mg at 06/30/24 1704    cetirizine (zyrTEC) tablet 10 mg  10 mg Oral Daily Josué De Oliveira MD   10 mg at 07/01/24 1259    dextrose (D50W) (25 g/50 mL) IV injection 25 g  25 g Intravenous Q15 Min PRN Latnaya Paez MD        dextrose (GLUTOSE) oral gel 15 g  15 g Oral Q15 Min PRN Latanya Paez MD        DULoxetine (CYMBALTA) DR capsule 60 mg  60 mg Oral Daily Josué De Oliveira MD   60 mg at 07/01/24 1259    ertapenem (INVanz)  1,000 mg in sodium chloride 0.9 % 100 mL MBP  1,000 mg Intravenous Q24H Josué De Oliveira  mL/hr at 07/01/24 1310 1,000 mg at 07/01/24 1310    fluticasone (FLONASE) 50 MCG/ACT nasal spray 1 spray  1 spray Nasal Daily Josué De Oliveira MD   1 spray at 07/01/24 0958    glucagon (GLUCAGEN) injection 1 mg  1 mg Intramuscular Q15 Min PRN Latanya Paez MD        HYDROcodone-acetaminophen (NORCO)  MG per tablet 1 tablet  1 tablet Oral Q6H PRN Latanya Paez MD   1 tablet at 06/29/24 1840    insulin glargine (LANTUS, SEMGLEE) injection 25 Units  25 Units Subcutaneous Nightly Josué De Oliveira MD        Insulin Lispro (humaLOG) injection 2-7 Units  2-7 Units Subcutaneous 4x Daily AC & at Bedtime Latanya Paez MD   2 Units at 07/01/24 1317    isosorbide mononitrate (IMDUR) 24 hr tablet 30 mg  30 mg Oral Daily Josué De Oliveira MD   30 mg at 07/01/24 1300    Magnesium Standard Dose Replacement - Follow Nurse / BPA Driven Protocol   Does not apply PRN Latanya Paez MD        nitroglycerin (NITROSTAT) SL tablet 0.4 mg  0.4 mg Sublingual Q5 Min PRN Latanya Paez MD        ondansetron (ZOFRAN) injection 4 mg  4 mg Intravenous Q6H PRN Latanya Paez MD   4 mg at 06/29/24 1714    oxyCODONE (ROXICODONE) immediate release tablet 10 mg  10 mg Oral BID PRN Latanya Paez MD   10 mg at 06/30/24 2054    pantoprazole (PROTONIX) EC tablet 40 mg  40 mg Oral QAM AC Josué De Oliveira MD   40 mg at 06/30/24 0823    Phosphorus Replacement - Follow Nurse / BPA Driven Protocol   Does not apply PRN Latanya Paez MD        polyethylene glycol (GoLYTELY) solution 3,000 mL  3,000 mL Oral Once Justen Recinos APRN        Followed by    [START ON 7/2/2024] polyethylene glycol (GoLYTELY) solution 1,000 mL  1,000 mL Oral Once Justen Recinos APRN        Potassium Replacement - Follow Nurse / BPA Driven Protocol   Does not apply PRN Latanya Paez MD        pregabalin (LYRICA) capsule 100 mg  100 mg Oral Nightly  Latanya Paez MD   100 mg at 06/30/24 2037    pregabalin (LYRICA) capsule 50 mg  50 mg Oral Daily Josué De Oliveira MD   50 mg at 07/01/24 1300    rosuvastatin (CRESTOR) tablet 10 mg  10 mg Oral Daily Josué De Oliveira MD   10 mg at 07/01/24 1300    sodium chloride 0.9 % flush 10 mL  10 mL Intravenous Q12H Latanya Paez MD   10 mL at 07/01/24 1001    sodium chloride 0.9 % flush 10 mL  10 mL Intravenous PRN Latanya Paez MD        sodium chloride 0.9 % infusion 40 mL  40 mL Intravenous PRN Latanya Paez MD        sodium chloride 0.9 % infusion 500 mL  500 mL Intravenous Continuous PRN Lamine Villar CRNA 25 mL/hr at 07/01/24 1317 Restarted at 07/01/24 1317    tamsulosin (FLOMAX) 24 hr capsule 0.4 mg  0.4 mg Oral Daily Josué De Oliveira MD   0.4 mg at 07/01/24 1300    timolol (TIMOPTIC) 0.5 % ophthalmic solution 1 drop  1 drop Both Eyes BID Josué De Oliveira MD   1 drop at 07/01/24 0958    [Held by provider] valsartan (DIOVAN) tablet 40 mg  40 mg Oral Daily Josué De Oliveira MD        [Held by provider] valsartan (DIOVAN) tablet 40 mg  40 mg Oral Nightly Josué De Oliveira MD           Past Medical History:  Past Medical History:   Diagnosis Date    Arthritis     Autonomic disease     CAD (coronary artery disease) 02/06/2017    Cervical radiculopathy 09/16/2021    Chronic constipation with acute exaccerbation 05/10/2021    Coronary artery disease     Degeneration of cervical intervertebral disc 08/11/2021    Diabetes mellitus     Diabetic foot ulcer 08/31/2020    Diabetic polyneuropathy associated with type 2 diabetes mellitus 01/18/2021    Elevated cholesterol     Gastroesophageal reflux disease 05/13/2019    Headache     HTN (hypertension), benign 05/03/2017    Hyperlipidemia     Hypertension     Mixed hyperlipidemia 02/07/2017    Multiple lung nodules 01/26/2020    5mm, 9 mm RLL identified 1/2020, not present 10/2019.    Myocardial infarction     Osteomyelitis 01/22/2020    Osteomyelitis of fifth  toe of right foot 10/07/2019    Pancreatitis     Persistent insomnia 01/20/2020    Renal disorder     Sleep apnea 02/06/2017    Sleep apnea with use of continuous positive airway pressure (CPAP)     NON-COMPLIANT    Slow transit constipation 01/16/2019    Spinal stenosis in cervical region 09/16/2021    Vitamin D deficiency 03/02/2021       Past Surgical History:  Past Surgical History:   Procedure Laterality Date    ABDOMINAL SURGERY      AMPUTATION FOOT / TOE Left 10/2021    5th digit     ANTERIOR CERVICAL DISCECTOMY W/ FUSION N/A 08/05/2022    Procedure: CERVICAL DISCECTOMY ANTERIOR WITH FUSION C5-6 with possible plating of C5-7 with neuromonitoring and 1 c-arm;  Surgeon: Karel Soliz MD;  Location:  [facility] OR;  Service: Neurosurgery;  Laterality: N/A;    APPENDECTOMY      BACK SURGERY      CARDIAC CATHETERIZATION Left 02/08/2021    Procedure: Left Heart Cath w poss intervention left anatomical snuff box acess;  Surgeon: Omkar Charles DO;  Location:  [facility] CATH INVASIVE LOCATION;  Service: Cardiology;  Laterality: Left;    CARDIAC SURGERY      CATARACT EXTRACTION      CERVICAL SPINE SURGERY      COLONOSCOPY N/A 01/31/2017    Normal exam repeat in 5 years    COLONOSCOPY N/A 02/11/2019    Mild acute inflammation    COLONOSCOPY N/A 04/07/2024    2 areas at 10 and 20 cm with friability ulceration 2 clips placed at 20 cm and 4 clips at 10 cm poor prep normal mucosa,mild eroisions and ulcerations in visible vessels    COLONOSCOPY W/ POLYPECTOMY  03/04/2014    Hyperplastic polyp    CORONARY ARTERY BYPASS GRAFT  10/2015    ENDOSCOPY  04/13/2011    Gastritis with hemorrhage    ENDOSCOPY N/A 05/05/2017    Normal exam    ENDOSCOPY N/A 02/11/2019    Gastritis    ENDOSCOPY N/A 09/01/2020    Non-erosive gastritis with hemorrhage    ENDOSCOPY N/A 02/10/2021    Esophagitis    ENDOSCOPY N/A 04/11/2024    There were esophageal mucosal changes suspicious for short-segment Low's esophagus present in the distal  esophagus. The maximum longitudinal extent of these mucosal changes was 2 cm in length. Mucosa was biopsied with a cold forceps for histologyDistal esophagus, biopsies: Mild chronic active esophagogastritis. No evidence of intestinal metaplasia, dysplasia. Antrum, bx, Mild chronic gastritis    FOOT SURGERY Left     INCISION AND DRAINAGE OF WOUND Left 2007    spider bite       Family History  Family History   Problem Relation Age of Onset    Colon cancer Father     Heart disease Father     Colon cancer Sister     Colon polyps Sister     Alzheimer's disease Mother     Coronary artery disease Sister     Coronary artery disease Sister        Social History  Social History     Socioeconomic History    Marital status:    Tobacco Use    Smoking status: Former     Current packs/day: 0.00     Types: Cigarettes     Quit date:      Years since quittin.5    Smokeless tobacco: Never    Tobacco comments:     smoked in [employer]   Vaping Use    Vaping status: Never Used   Substance and Sexual Activity    Alcohol use: No    Drug use: No    Sexual activity: Defer       Review of Systems:  History obtained from chart review and the patient  General ROS: No fever or chills  Respiratory ROS: No cough, shortness of breath, wheezing  Cardiovascular ROS: No chest pain or palpitations  Gastrointestinal ROS: No abdominal pain or melena  Genito-Urinary ROS: No dysuria or hematuria  Psych ROS: No anxiety and depression  14 point ROS reviewed with the patient and negative except as noted above and in the HPI unless unable to obtain.    Objective:  Patient Vitals for the past 24 hrs:   BP Temp Temp src Pulse Resp SpO2 Weight   24 1503 119/63 97.8 °F (36.6 °C) Oral 62 16 99 % --   24 1307 118/66 97.5 °F (36.4 °C) Oral 67 16 99 % --   24 1235 129/67 -- -- 70 14 99 % --   24 1230 130/69 -- -- 65 14 99 % --   24 1225 126/80 -- -- 68 16 98 % --   24 1220 117/72 -- -- 69 19 100 % --   24  1216 124/64 -- -- 70 11 100 % --   07/01/24 0724 129/64 97.5 °F (36.4 °C) Oral 67 18 99 % --   07/01/24 0555 -- -- -- -- -- -- 129 kg (284 lb)   07/01/24 0358 121/61 97.5 °F (36.4 °C) Oral 63 18 99 % --       Intake/Output Summary (Last 24 hours) at 7/1/2024 1727  Last data filed at 7/1/2024 1317  Gross per 24 hour   Intake 550 ml   Output 1300 ml   Net -750 ml     General: awake/alert   Chest:  clear to auscultation bilaterally without respiratory distress  CVS: regular rate and rhythm  Abdominal: soft, nontender, positive bowel sounds  Extremities: ble edema  Skin: warm and dry without rash      Labs:  Results from last 7 days   Lab Units 06/30/24  0638 06/29/24  0850 06/28/24 2009   WBC 10*3/mm3 6.79 7.68 10.18   HEMOGLOBIN g/dL 10.6* 10.1* 10.5*   HEMATOCRIT % 34.1* 32.5* 33.5*   PLATELETS 10*3/mm3 238 215 252         Results from last 7 days   Lab Units 07/01/24  0659 06/30/24  0638 06/29/24  0850 06/28/24 2009 06/27/24  0612   SODIUM mmol/L 139 140 139 140 140   POTASSIUM mmol/L 3.5 3.9 4.1 4.2 4.3   CHLORIDE mmol/L 99 103 103 104 106   CO2 mmol/L 28.0 26.0 22.0 23.0 22.0   BUN mg/dL 32* 35* 34* 32* 31*   CREATININE mg/dL 1.68* 1.86* 1.88* 2.01* 1.86*   CALCIUM mg/dL 8.8 9.1 9.1 9.1 9.0   EGFR mL/min/1.73 44.0* 38.9* 38.4* 35.5* 38.9*   BILIRUBIN mg/dL  --   --   --  0.3 0.4   ALK PHOS U/L  --   --   --  108 108   ALT (SGPT) U/L  --   --   --  9 10   AST (SGOT) U/L  --   --   --  14 12   GLUCOSE mg/dL 151* 230* 334* 177* 394*       Radiology:   Imaging Results (Last 72 Hours)       Procedure Component Value Units Date/Time    XR Chest 1 View [[id number]] Collected: 06/28/24 2115     Updated: 06/28/24 2119    Narrative:      EXAM: XR CHEST 1 VW- 6/28/2024 7:03 PM     HISTORY: [facility] Triage Protocol       COMPARISON: 5/3/2024.     TECHNIQUE: Single frontal radiograph of the chest was obtained.     FINDINGS:     Support Devices: Prior median sternotomy.     Cardiac and Mediastinal Silhouettes: Normal.      Lungs/Pleura: Mild interstitial prominence. Suspected small right  pleural effusion. No visible pneumothorax.     Osseous structures: No acute osseous finding.     Other: None.       Impression:         Suspected small left pleural effusion.     Mild interstitial prominence may suggest mild edema.           This report was signed and finalized on 6/28/2024 9:16 PM by Ronal Wisdom.               Culture:  Urine Culture   Date Value Ref Range Status   06/27/2024 >100,000 CFU/mL Escherichia coli ESBL (A)  Final     Comment:       Consider infectious disease consult.  Susceptibility results may not correlate to clinical outcomes.         Assessment     Chronic kidney disease stage IIIb  Diabetes type 2 with diabetic nephropathy  Acute on chronic diastolic congestive heart failure  Secondary hyperparathyroidism  Anemia and chronic kidney disease  CAD  Atrial Fibrillation  Hypertension  UTI (ESBL)       Plan:  Discussed with patient, nursing  Work-up reviewed today  IV diuretics with close lab and clinical monitoring  Cardiology evaluation reviewed  Bowel regimen per primary  Planning scope in AM  Further testing ordered ongoing  S/p course of Levaquin, repeat UA.          Vinny Hartley MD  7/1/2024  17:27 CDT

## 2024-07-01 NOTE — PROGRESS NOTES
Nephrology ([facility]) Progress Note      Patient:  Erick Luong  YOB: 1955  Date of Service: 6/30/2024  MRN: [id number]   Acct: [id number]   Primary Care Physician: Del Shetty MD  Advance Directive:   Code Status and Medical Interventions:   Ordered at: 06/30/24 1119     Level Of Support Discussed With:    Patient     Code Status (Patient has no pulse and is not breathing):    CPR (Attempt to Resuscitate)     Medical Interventions (Patient has pulse or is breathing):    Full Support     Admit Date: 6/28/2024       Hospital Day: 2  Referring Provider: Latanya Paez MD      Patient personally seen and examined.  Complete chart including Consults, Notes, Operative Reports, Labs, Cardiology, and Radiology studies reviewed as able.        Subjective:  Erick Luong is a 69 y.o. male for whom we were consulted for evaluation and treatment of chronic kidney disease stage IIIb.  He has stage IIIb chronic kidney disease baseline and follows with Dr. Lomas in the office as he has diabetic nephropathy.  He has a history of insulin-dependent type 2 diabetes, hypertension, abdominal obesity, obstructive sleep apnea, diabetic foot ulcer, Vitamin D Deficiency and coronary artery disease.  He had multiple complaints over the last week including no bowel movement x 2 weeks, several falls at home, cystitis, GI bleed with planned colonoscopy coming up on Monday and increasing peripheral edema.  Also had some diffuse abdominal pain from the falls.  Denied hematuria, dysuria, hemoptysis, rash.  Did have bruising and scratches from the falls.  Seen with family for initial evaluation.     Today, no overnight events.  Up in chair with feet elevated.  Tolerating diet.  2.88 L recorded net negative yesterday.  Denied chest pain, shortness of air at rest, nausea or vomiting.    Allergies:  Cefepime, Bactrim [sulfamethoxazole-trimethoprim], Vancomycin, Zolpidem, and Metronidazole    Home  Meds:  Medications Prior to Admission   Medication Sig Dispense Refill Last Dose    ascorbic acid (VITAMIN C) 1000 MG tablet Take 1 tablet by mouth Daily. 30 tablet 3 Past Week    bumetanide (BUMEX) 2 MG tablet Take 1 tablet by mouth Daily. 90 tablet 0 Past Week    busPIRone (BUSPAR) 10 MG tablet Take 1 tablet by mouth 2 (Two) Times a Day.   Past Week    calcitriol (ROCALTROL) 0.5 MCG capsule Take 1 capsule by mouth Daily. 90 capsule 4 Past Week    carvedilol (COREG) 3.125 MG tablet Take 1 tablet twice a day by oral route. 60 tablet 4 Past Week    Diclofenac Sodium (VOLTAREN) 1 % gel gel Apply 2 g topically to the appropriate area as directed 4 (Four) Times a Day As Needed. 300 g 11 Past Month    donepezil (ARICEPT) 10 MG tablet Take 1 tablet by mouth Daily. 90 tablet 1 Past Week    DULoxetine (CYMBALTA) 60 MG capsule Take 1 capsule by mouth Daily. 90 capsule 4 Past Week    famotidine (PEPCID) 20 MG tablet Take 1 tablet twice a day by oral route. 180 tablet 4 Past Week    fluticasone (FLONASE) 50 MCG/ACT nasal spray Instill 1 spray in each nostril as directed by provider Daily. 16 g 1 Past Week    Insulin Glargine (Lantus SoloStar) 100 UNIT/ML injection pen Inject 20 Units under the skin into the appropriate area as directed every night at bedtime. 15 mL 3 Past Week    Insulin Regular Human, Conc, (HumuLIN R) 500 UNIT/ML solution pen-injector CONCENTRATED injection Inject 15 Units under the skin into the appropriate area as directed 3 (Three) Times a Day Before Meals.   Past Week    Insulin Regular Human, Conc, (HumuLIN R) 500 UNIT/ML solution pen-injector CONCENTRATED injection Inject 40 Units under the skin into the appropriate area as directed 3 (Three) Times a Day Before Meals. Inject 40 units under the skin in the the appropriate area with regular meals AND 40 units with large meals.   Past Week    Iron-Vitamin C (Vitron-C)  MG tablet Take 1 tablet twice a day by oral route. 60 tablet 2 Past Week     lactulose (Enulose) 10 GM/15ML solution solution (encephalopathy) Take 30 mL by mouth 3 times a day for 30 days. 2838 mL 11 Past Week    levocetirizine (XYZAL) 5 MG tablet Take 1 tablet by mouth every day at bedtime. 30 tablet 2 Past Week    levoFLOXacin (LEVAQUIN) 750 MG tablet Take 1 tablet by mouth Daily. 7 tablet 0 Past Week    melatonin 3 MG tablet Take 2 tablets by mouth At Night As Needed for Sleep. 30 tablet 0 Past Week    midodrine (PROAMATINE) 10 MG tablet Take 1 tablet by mouth 3 (Three) Times a Day. 270 tablet 4 Past Week    oxyCODONE (ROXICODONE) 10 MG tablet Take 1 tablet by mouth 2 (Two) Times a Day As Needed---MUST last 30 days 55 tablet 0 Past Week    pantoprazole (PROTONIX) 40 MG EC tablet Take 1 tablet by mouth Every 12 (Twelve) Hours. 180 tablet 4 Past Week    polyethylene glycol (MIRALAX) 17 GM/SCOOP powder Take 17 g by mouth Daily As Needed (constipation).   Past Week    pregabalin (LYRICA) 100 MG capsule Take 2 capsules by mouth Every Night.   Past Week    pregabalin (LYRICA) 50 MG capsule Take 1 capsule by mouth Daily.   Past Week    rosuvastatin (CRESTOR) 10 MG tablet Take 1 tablet by mouth Every Night. 30 tablet 2 Past Week    sennosides-docusate (PERICOLACE) 8.6-50 MG per tablet Take 1 tablet by mouth Every Night. Obtain OTC   Past Week    sodium hypochlorite (DAKIN'S 1/4 STRENGTH) 0.125 % solution topical solution 0.125% Apply 1 application topically to the appropriate area as directed 2 (Two) Times a Day. 473 mL 5 Past Week    sucralfate (CARAFATE) 1 g tablet Take 1 tablet by mouth 4 (Four) Times a Day before meals. 360 tablet 1 Past Week    tamsulosin (FLOMAX) 0.4 MG capsule 24 hr capsule Take 1 capsule by mouth Daily. 90 capsule 1 Past Week    valsartan (DIOVAN) 40 MG tablet Take 1 tablet by mouth Every Night. 30 tablet 2 Past Week    zinc sulfate (ZINCATE) 50 MG capsule Take 1 capsule by mouth Daily.   Past Week    nitroglycerin (NITROSTAT) 0.4 MG SL tablet Place 1 tablet under the  tongue Every 5 (Five) Minutes As Needed for Chest Pain. Take no more than 3 doses in 15 minutes.   Unknown       Medicines:  Current Facility-Administered Medications   Medication Dose Route Frequency Provider Last Rate Last Admin    acetaminophen (TYLENOL) tablet 650 mg  650 mg Oral Q4H PRN Latanya Paez MD        Or    acetaminophen (TYLENOL) 160 MG/5ML oral solution 650 mg  650 mg Oral Q4H PRN Latanya Paez MD        Or    acetaminophen (TYLENOL) suppository 650 mg  650 mg Rectal Q4H PRN Latanya Paez MD        sennosides-docusate (PERICOLACE) 8.6-50 MG per tablet 2 tablet  2 tablet Oral BID PRN Latanya Paez MD        And    polyethylene glycol (MIRALAX) packet 17 g  17 g Oral Daily PRN Latanya Paez MD   17 g at 06/29/24 1305    And    bisacodyl (DULCOLAX) EC tablet 5 mg  5 mg Oral Daily PRN Latanya Paez MD   5 mg at 06/29/24 1304    And    bisacodyl (DULCOLAX) suppository 10 mg  10 mg Rectal Daily PRN Latanya Paez MD   10 mg at 06/29/24 1840    bumetanide (BUMEX) injection 1 mg  1 mg Intravenous BID Josué De Oliveira MD   1 mg at 06/30/24 1704    calcitriol (ROCALTROL) capsule 0.5 mcg  0.5 mcg Oral Daily Josué De Oliveira MD   0.5 mcg at 06/30/24 0821    Calcium Replacement - Follow Nurse / BPA Driven Protocol   Does not apply PRN Latanya Paez MD        carvedilol (COREG) tablet 3.125 mg  3.125 mg Oral BID With Meals Donna Roman APRN   3.125 mg at 06/30/24 1704    cetirizine (zyrTEC) tablet 10 mg  10 mg Oral Daily Josué De Oliveira MD   10 mg at 06/30/24 0821    dextrose (D50W) (25 g/50 mL) IV injection 25 g  25 g Intravenous Q15 Min PRN Latanya Paez MD        dextrose (GLUTOSE) oral gel 15 g  15 g Oral Q15 Min PRN Latanya Paez MD        DULoxetine (CYMBALTA) DR capsule 60 mg  60 mg Oral Daily Josué De Oliveira MD   60 mg at 06/30/24 0822    ertapenem (INVanz) 1,000 mg in sodium chloride 0.9 % 100 mL MBP  1,000 mg Intravenous Q24H Josué De Oliveira  mL/hr at 06/30/24  1151 1,000 mg at 06/30/24 1151    fluticasone (FLONASE) 50 MCG/ACT nasal spray 1 spray  1 spray Nasal Daily Josué De Oliveira MD   1 spray at 06/30/24 0822    glucagon (GLUCAGEN) injection 1 mg  1 mg Intramuscular Q15 Min PRN Latanya Paez MD        HYDROcodone-acetaminophen (NORCO)  MG per tablet 1 tablet  1 tablet Oral Q6H PRN Latanya Paez MD   1 tablet at 06/29/24 1840    insulin glargine (LANTUS, SEMGLEE) injection 25 Units  25 Units Subcutaneous Nightly Josué De Oliveira MD        Insulin Lispro (humaLOG) injection 2-7 Units  2-7 Units Subcutaneous 4x Daily AC & at Bedtime Latanya Paez MD   2 Units at 06/30/24 1704    isosorbide mononitrate (IMDUR) 24 hr tablet 30 mg  30 mg Oral Daily Josué De Oliveira MD   30 mg at 06/30/24 0821    Magnesium Standard Dose Replacement - Follow Nurse / BPA Driven Protocol   Does not apply PRN Latanya Paez MD        nitroglycerin (NITROSTAT) SL tablet 0.4 mg  0.4 mg Sublingual Q5 Min PRN Latanya Paez MD        ondansetron (ZOFRAN) injection 4 mg  4 mg Intravenous Q6H PRN Latanya Paez MD   4 mg at 06/29/24 1714    oxyCODONE (ROXICODONE) immediate release tablet 10 mg  10 mg Oral BID PRN Latanya Paez MD   10 mg at 06/29/24 0643    pantoprazole (PROTONIX) EC tablet 40 mg  40 mg Oral QAM AC Josué De Oliveira MD   40 mg at 06/30/24 0823    Phosphorus Replacement - Follow Nurse / BPA Driven Protocol   Does not apply PRLatanya Cid MD        Potassium Replacement - Follow Nurse / BPA Driven Protocol   Does not apply PRN Latanya Paez MD        pregabalin (LYRICA) capsule 100 mg  100 mg Oral Nightly Latanya Paez MD   100 mg at 06/29/24 2112    pregabalin (LYRICA) capsule 50 mg  50 mg Oral Daily Josué De Oliveira MD   50 mg at 06/30/24 0822    rosuvastatin (CRESTOR) tablet 10 mg  10 mg Oral Daily Josué De Oliveira MD   10 mg at 06/30/24 0821    sodium chloride 0.9 % flush 10 mL  10 mL Intravenous Q12H Latanya Paez MD   10 mL at 06/30/24 0822     sodium chloride 0.9 % flush 10 mL  10 mL Intravenous PRN Latanya Paez MD        sodium chloride 0.9 % infusion 40 mL  40 mL Intravenous PRN Latanya Paez MD        tamsulosin (FLOMAX) 24 hr capsule 0.4 mg  0.4 mg Oral Daily Josué De Oliveira MD   0.4 mg at 06/30/24 0821    timolol (TIMOPTIC) 0.5 % ophthalmic solution 1 drop  1 drop Both Eyes BID Josué De Oliveira MD   1 drop at 06/30/24 0822    [Held by provider] valsartan (DIOVAN) tablet 40 mg  40 mg Oral Daily Josué De Oliveira MD        [Held by provider] valsartan (DIOVAN) tablet 40 mg  40 mg Oral Nightly Josué De Oliveira MD           Past Medical History:  Past Medical History:   Diagnosis Date    Arthritis     Autonomic disease     CAD (coronary artery disease) 02/06/2017    Cervical radiculopathy 09/16/2021    Chronic constipation with acute exaccerbation 05/10/2021    Coronary artery disease     Degeneration of cervical intervertebral disc 08/11/2021    Diabetes mellitus     Diabetic foot ulcer 08/31/2020    Diabetic polyneuropathy associated with type 2 diabetes mellitus 01/18/2021    Elevated cholesterol     Gastroesophageal reflux disease 05/13/2019    Headache     HTN (hypertension), benign 05/03/2017    Hyperlipidemia     Hypertension     Mixed hyperlipidemia 02/07/2017    Multiple lung nodules 01/26/2020    5mm, 9 mm RLL identified 1/2020, not present 10/2019.    Myocardial infarction     Osteomyelitis 01/22/2020    Osteomyelitis of fifth toe of right foot 10/07/2019    Pancreatitis     Persistent insomnia 01/20/2020    Renal disorder     Sleep apnea 02/06/2017    Sleep apnea with use of continuous positive airway pressure (CPAP)     NON-COMPLIANT    Slow transit constipation 01/16/2019    Spinal stenosis in cervical region 09/16/2021    Vitamin D deficiency 03/02/2021       Past Surgical History:  Past Surgical History:   Procedure Laterality Date    ABDOMINAL SURGERY      AMPUTATION FOOT / TOE Left 10/2021    5th digit     ANTERIOR CERVICAL  DISCECTOMY W/ FUSION N/A 08/05/2022    Procedure: CERVICAL DISCECTOMY ANTERIOR WITH FUSION C5-6 with possible plating of C5-7 with neuromonitoring and 1 c-arm;  Surgeon: Karel Soliz MD;  Location:  [facility] OR;  Service: Neurosurgery;  Laterality: N/A;    APPENDECTOMY      BACK SURGERY      CARDIAC CATHETERIZATION Left 02/08/2021    Procedure: Left Heart Cath w poss intervention left anatomical snuff box acess;  Surgeon: Omkar Charles DO;  Location:  [facility] CATH INVASIVE LOCATION;  Service: Cardiology;  Laterality: Left;    CARDIAC SURGERY      CATARACT EXTRACTION      CERVICAL SPINE SURGERY      COLONOSCOPY N/A 01/31/2017    Normal exam repeat in 5 years    COLONOSCOPY N/A 02/11/2019    Mild acute inflammation    COLONOSCOPY N/A 04/07/2024    2 areas at 10 and 20 cm with friability ulceration 2 clips placed at 20 cm and 4 clips at 10 cm poor prep normal mucosa,mild eroisions and ulcerations in visible vessels    COLONOSCOPY W/ POLYPECTOMY  03/04/2014    Hyperplastic polyp    CORONARY ARTERY BYPASS GRAFT  10/2015    ENDOSCOPY  04/13/2011    Gastritis with hemorrhage    ENDOSCOPY N/A 05/05/2017    Normal exam    ENDOSCOPY N/A 02/11/2019    Gastritis    ENDOSCOPY N/A 09/01/2020    Non-erosive gastritis with hemorrhage    ENDOSCOPY N/A 02/10/2021    Esophagitis    ENDOSCOPY N/A 04/11/2024    There were esophageal mucosal changes suspicious for short-segment Low's esophagus present in the distal esophagus. The maximum longitudinal extent of these mucosal changes was 2 cm in length. Mucosa was biopsied with a cold forceps for histologyDistal esophagus, biopsies: Mild chronic active esophagogastritis. No evidence of intestinal metaplasia, dysplasia. Antrum, bx, Mild chronic gastritis    FOOT SURGERY Left     INCISION AND DRAINAGE OF WOUND Left 09/2007    spider bite       Family History  Family History   Problem Relation Age of Onset    Colon cancer Father     Heart disease Father     Colon cancer  Sister     Colon polyps Sister     Alzheimer's disease Mother     Coronary artery disease Sister     Coronary artery disease Sister        Social History  Social History     Socioeconomic History    Marital status:    Tobacco Use    Smoking status: Former     Current packs/day: 0.00     Types: Cigarettes     Quit date:      Years since quittin.5    Smokeless tobacco: Never    Tobacco comments:     smoked in highschool   Vaping Use    Vaping status: Never Used   Substance and Sexual Activity    Alcohol use: No    Drug use: No    Sexual activity: Defer       Review of Systems:  History obtained from chart review and the patient  General ROS: No fever or chills  Respiratory ROS: No cough, shortness of breath, wheezing  Cardiovascular ROS: No chest pain or palpitations  Gastrointestinal ROS: No abdominal pain or melena  Genito-Urinary ROS: No dysuria or hematuria  Psych ROS: No anxiety and depression  14 point ROS reviewed with the patient and negative except as noted above and in the HPI unless unable to obtain.    Objective:  Patient Vitals for the past 24 hrs:   BP Temp Temp src Pulse Resp SpO2 Weight   24 1602 108/59 98 °F (36.7 °C) Oral 65 16 98 % --   24 1130 111/61 97.5 °F (36.4 °C) Oral 66 16 97 % --   24 0748 117/60 97.5 °F (36.4 °C) Oral 63 16 96 % --   24 0339 104/55 97.7 °F (36.5 °C) Oral 67 18 96 % 129 kg (283 lb 12.8 oz)   24 2300 117/60 97.7 °F (36.5 °C) Oral 78 18 98 % --   24 1930 111/61 97.5 °F (36.4 °C) Oral 67 16 94 % --       Intake/Output Summary (Last 24 hours) at 2024 1907  Last data filed at 2024 1829  Gross per 24 hour   Intake 650 ml   Output 2050 ml   Net -1400 ml     General: awake/alert   Chest:  clear to auscultation bilaterally without respiratory distress  CVS: regular rate and rhythm  Abdominal: soft, nontender, positive bowel sounds  Extremities: ble edema  Skin: warm and dry without rash      Labs:  Results from last 7 days    Lab Units 06/30/24  0638 06/29/24  0850 06/28/24 2009   WBC 10*3/mm3 6.79 7.68 10.18   HEMOGLOBIN g/dL 10.6* 10.1* 10.5*   HEMATOCRIT % 34.1* 32.5* 33.5*   PLATELETS 10*3/mm3 238 215 252         Results from last 7 days   Lab Units 06/30/24  0638 06/29/24  0850 06/28/24 2009 06/27/24  0612   SODIUM mmol/L 140 139 140 140   POTASSIUM mmol/L 3.9 4.1 4.2 4.3   CHLORIDE mmol/L 103 103 104 106   CO2 mmol/L 26.0 22.0 23.0 22.0   BUN mg/dL 35* 34* 32* 31*   CREATININE mg/dL 1.86* 1.88* 2.01* 1.86*   CALCIUM mg/dL 9.1 9.1 9.1 9.0   EGFR mL/min/1.73 38.9* 38.4* 35.5* 38.9*   BILIRUBIN mg/dL  --   --  0.3 0.4   ALK PHOS U/L  --   --  108 108   ALT (SGPT) U/L  --   --  9 10   AST (SGOT) U/L  --   --  14 12   GLUCOSE mg/dL 230* 334* 177* 394*       Radiology:   Imaging Results (Last 72 Hours)       Procedure Component Value Units Date/Time    XR Chest 1 View [[id number]] Collected: 06/28/24 2115     Updated: 06/28/24 2119    Narrative:      EXAM: XR CHEST 1 VW- 6/28/2024 7:03 PM     HISTORY: [facility] Triage Protocol       COMPARISON: 5/3/2024.     TECHNIQUE: Single frontal radiograph of the chest was obtained.     FINDINGS:     Support Devices: Prior median sternotomy.     Cardiac and Mediastinal Silhouettes: Normal.     Lungs/Pleura: Mild interstitial prominence. Suspected small right  pleural effusion. No visible pneumothorax.     Osseous structures: No acute osseous finding.     Other: None.       Impression:         Suspected small left pleural effusion.     Mild interstitial prominence may suggest mild edema.           This report was signed and finalized on 6/28/2024 9:16 PM by Ronal Wisdom.               Culture:  Urine Culture   Date Value Ref Range Status   06/27/2024 >100,000 CFU/mL Escherichia coli ESBL (A)  Final     Comment:       Consider infectious disease consult.  Susceptibility results may not correlate to clinical outcomes.         Assessment     Chronic kidney disease stage IIIb  Diabetes type 2 with  diabetic nephropathy  Acute on chronic diastolic congestive heart failure  Secondary hyperparathyroidism  Anemia and chronic kidney disease  CAD  Atrial Fibrillation  Hypertension     Plan:  Discussed with patient, nursing, family  Work-up reviewed today  IV diuretics with close lab and clinical monitoring  Cardiology evaluation reviewed  Bowel regimen per primary  Planning scope in AM  Reassess in the morning  Further testing ordered ongoing           Willy Arteaga MD  6/30/2024  19:07 CDT

## 2024-07-01 NOTE — THERAPY TREATMENT NOTE
Acute Care - Physical Therapy Treatment Note  [facility]     Patient Name: Erick Luong  : 1955  MRN: [id number]  Today's Date: 2024      Visit Dx:     ICD-10-CM ICD-9-CM   1. Chest pain with high risk for cardiac etiology  R07.9 786.50   2. Impaired mobility [Z74.09]  Z74.09 799.89   3. Slow transit constipation  K59.01 564.01   4. Diarrhea, unspecified type  R19.7 787.91   5. Rectal bleeding  K62.5 569.3     Patient Active Problem List   Diagnosis    Obesity, unspecified obesity severity, unspecified obesity type    Essential hypertension    Type 2 diabetes mellitus with hyperglycemia, with long-term current use of insulin    Nonsmoker    Anemia due to chronic kidney disease    Class 3 severe obesity due to excess calories with body mass index (BMI) of 40.0 to 44.9 in adult    Anasarca    Sleep apnea with use of continuous positive airway pressure (CPAP)    Medically noncompliant    Diabetic ulcer of left midfoot associated with type 2 diabetes mellitus, with fat layer exposed    Diabetic polyneuropathy associated with type 2 diabetes mellitus    Spinal stenosis in cervical region    Degeneration of cervical intervertebral disc    Cervical radiculopathy    Degeneration of lumbar or lumbosacral intervertebral disc    Cervical myelopathy    Bilateral carpal tunnel syndrome    CAD (coronary artery disease)    GERD without esophagitis    BPH without obstruction/lower urinary tract symptoms    Stage 3b chronic kidney disease    Chronic diastolic heart failure    Type 2 myocardial infarction due to heart failure    Left carpal tunnel syndrome    Syncope and collapse, recurrent episodes    Poorly-controlled hypertension    Rhinovirus    Peripheral vascular disease    Chronic kidney disease (CKD), stage IV (severe)    Diabetic foot infection    Sepsis    Epigastric pain    Chronic heart failure with preserved ejection fraction (HFpEF)    Sepsis due to methicillin resistant Staphylococcus aureus (MRSA) with  encephalopathy without septic shock    Slow transit constipation    Diarrhea    CHF exacerbation    CHF (congestive heart failure), NYHA class I, acute on chronic, combined    Rectal bleeding     Past Medical History:   Diagnosis Date    Arthritis     Autonomic disease     CAD (coronary artery disease) 02/06/2017    Cervical radiculopathy 09/16/2021    Chronic constipation with acute exaccerbation 05/10/2021    Coronary artery disease     Degeneration of cervical intervertebral disc 08/11/2021    Diabetes mellitus     Diabetic foot ulcer 08/31/2020    Diabetic polyneuropathy associated with type 2 diabetes mellitus 01/18/2021    Elevated cholesterol     Gastroesophageal reflux disease 05/13/2019    Headache     HTN (hypertension), benign 05/03/2017    Hyperlipidemia     Hypertension     Mixed hyperlipidemia 02/07/2017    Multiple lung nodules 01/26/2020    5mm, 9 mm RLL identified 1/2020, not present 10/2019.    Myocardial infarction     Osteomyelitis 01/22/2020    Osteomyelitis of fifth toe of right foot 10/07/2019    Pancreatitis     Persistent insomnia 01/20/2020    Renal disorder     Sleep apnea 02/06/2017    Sleep apnea with use of continuous positive airway pressure (CPAP)     NON-COMPLIANT    Slow transit constipation 01/16/2019    Spinal stenosis in cervical region 09/16/2021    Vitamin D deficiency 03/02/2021     Past Surgical History:   Procedure Laterality Date    ABDOMINAL SURGERY      AMPUTATION FOOT / TOE Left 10/2021    5th digit     ANTERIOR CERVICAL DISCECTOMY W/ FUSION N/A 08/05/2022    Procedure: CERVICAL DISCECTOMY ANTERIOR WITH FUSION C5-6 with possible plating of C5-7 with neuromonitoring and 1 c-arm;  Surgeon: Karel Soliz MD;  Location: [facility];  Service: Neurosurgery;  Laterality: N/A;    APPENDECTOMY      BACK SURGERY      CARDIAC CATHETERIZATION Left 02/08/2021    Procedure: Left Heart Cath w poss intervention left anatomical snuff box acess;  Surgeon: Omkar Charles DO;   Location:  [facility];  Service: Cardiology;  Laterality: Left;    CARDIAC SURGERY      CATARACT EXTRACTION      CERVICAL SPINE SURGERY      COLONOSCOPY N/A 01/31/2017    Normal exam repeat in 5 years    COLONOSCOPY N/A 02/11/2019    Mild acute inflammation    COLONOSCOPY N/A 04/07/2024    2 areas at 10 and 20 cm with friability ulceration 2 clips placed at 20 cm and 4 clips at 10 cm poor prep normal mucosa,mild eroisions and ulcerations in visible vessels    COLONOSCOPY W/ POLYPECTOMY  03/04/2014    Hyperplastic polyp    CORONARY ARTERY BYPASS GRAFT  10/2015    ENDOSCOPY  04/13/2011    Gastritis with hemorrhage    ENDOSCOPY N/A 05/05/2017    Normal exam    ENDOSCOPY N/A 02/11/2019    Gastritis    ENDOSCOPY N/A 09/01/2020    Non-erosive gastritis with hemorrhage    ENDOSCOPY N/A 02/10/2021    Esophagitis    ENDOSCOPY N/A 04/11/2024    There were esophageal mucosal changes suspicious for short-segment Low's esophagus present in the distal esophagus. The maximum longitudinal extent of these mucosal changes was 2 cm in length. Mucosa was biopsied with a cold forceps for histologyDistal esophagus, biopsies: Mild chronic active esophagogastritis. No evidence of intestinal metaplasia, dysplasia. Antrum, bx, Mild chronic gastritis    FOOT SURGERY Left     INCISION AND DRAINAGE OF WOUND Left 09/2007    spider bite     PT Assessment (Last 12 Hours)       PT Evaluation and Treatment       Row Name 07/01/24 1515          Physical Therapy Time and Intention    Subjective Information complains of;fatigue;weakness  -LY     Document Type therapy note (daily note)  -LY     Mode of Treatment physical therapy  -LY       Row Name 07/01/24 1515          General Information    Existing Precautions/Restrictions fall  wears walking boot LLE  -LY       Row Name 07/01/24 1515          Pain    Pretreatment Pain Rating 0/10 - no pain  -LY     Posttreatment Pain Rating 0/10 - no pain  -LY       Row Name 07/01/24 1515           Bed Mobility    Comment, (Bed Mobility) in chair  -LY       Row Name 07/01/24 1515          Sit-Stand Transfer    Sit-Stand [place] (Transfers) contact guard  -LY       Row Name 07/01/24 1515          Stand-Sit Transfer    Stand-Sit [place] (Transfers) verbal cues;contact guard  -LY       Row Name 07/01/24 1515          Gait/Stairs (Locomotion)    [place] Level (Gait) verbal cues;contact guard;minimum assist (75% patient effort)  -LY     Distance in Feet (Gait) 200  -LY     Pattern (Gait) step-through  -LY     Deviations/Abnormal Patterns (Gait) gait speed decreased;scissoring  -LY     Comment, (Gait/Stairs) pt unsteady at times, cues for safe gait speed and with turns. 1LOB during turn with min x1 to recover  -LY       Row Name 07/01/24 1515          Motor Skills    Comments, Therapeutic Exercise BLE AROM x10 reps seated, cues for technique  -LY       Row Name             Wound Left lateral foot Diabetic Ulcer    Wound - Properties Group Side: Left  - Orientation: lateral  - Location: foot  -JACIEL, 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;diabetic foot ulcer/gangrene  Primary Wound Type: Diabetic ulc  -JACIEL Wound Outcome: Amputation  -JACIEL Stage, Pressure Injury : other (see comments)  -JACIEL, full thickness starting 10/20/21     Retired Wound - Properties Group Side: Left  - Orientation: lateral  - Location: foot  -JACIEL, 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;diabetic foot ulcer/gangrene  Primary Wound Type: Diabetic ulc  -JACIEL Stage, Pressure Injury : other (see comments)  -JACIEL, full thickness starting 10/20/21  Wound Outcome: Amputation  -JACIEL    Retired Wound - Properties Group Side: Left  - Location: foot  -JACIEL, 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;diabetic foot ulcer/gangrene  Primary Wound Type: Diabetic ulc  -JACIEL Wound Outcome: Amputation  -JACIEL      Row Name             Wound 06/15/23 0102 Left anterior plantar    Wound - Properties Group Placement Date: 06/15/23  -SM Placement Time: 0102 -SM Side: Left  - Orientation: anterior  -  Location: plantar  -SM    Retired Wound - Properties Group Placement Date: 06/15/23  -SM Placement Time: 0102 -SM Side: Left  -SM Orientation: anterior  -SM Location: plantar  -SM    Retired Wound - Properties Group Date first assessed: 06/15/23  -SM Time first assessed: 0102 -SM Side: Left  -SM Location: plantar  -SM      Row Name             Wound 04/04/24 2100 Left posterior wrist Skin Tear    Wound - Properties Group Placement Date: 04/04/24  -NR Placement Time: 2100  -NR Present on Original Admission: N  -NR Side: Left  -NR Orientation: posterior  -NR Location: wrist  -NR Primary Wound Type: Skin tear  -NR Additional Comments: likely caused by wristband. Band removed, site cleaned with alcohol swab, wraped in gauze  -NR    Retired Wound - Properties Group Placement Date: 04/04/24  -NR Placement Time: 2100  -NR Present on Original Admission: N  -NR Side: Left  -NR Orientation: posterior  -NR Location: wrist  -NR Primary Wound Type: Skin tear  -NR Additional Comments: likely caused by wristband. Band removed, site cleaned with alcohol swab, wraped in gauze  -NR    Retired Wound - Properties Group Date first assessed: 04/04/24  -NR Time first assessed: 2100  -NR Present on Original Admission: N  -NR Side: Left  -NR Location: wrist  -NR Primary Wound Type: Skin tear  -NR Additional Comments: likely caused by wristband. Band removed, site cleaned with alcohol swab, wraped in gauze  -NR      Row Name             Wound 08/22/23 0140 Left posterior plantar    Wound - Properties Group Placement Date: 08/22/23  -AM Placement Time: 0140  -AM Present on Original Admission: Y  -AM Side: Left  -AM Orientation: posterior  -AM Location: plantar  -AM    Retired Wound - Properties Group Placement Date: 08/22/23  -AM Placement Time: 0140  -AM Present on Original Admission: Y  -AM Side: Left  -AM Orientation: posterior  -AM Location: plantar  -AM    Retired Wound - Properties Group Date first assessed: 08/22/23  -AM Time first  assessed: 0140  -AM Present on Original Admission: Y  -AM Side: Left  -AM Location: plantar  -AM      Row Name 07/01/24 1515          Plan of Care Review    Plan of Care Reviewed With patient  -LY     Progress improving  -LY       Row Name 07/01/24 1515          Positioning and Restraints    Pre-Treatment Position sitting in chair/recliner  -LY     Post Treatment Position chair  -LY     In Chair sitting;call light within reach;encouraged to call for assist  -LY               User Key  (r) = Recorded By, (t) = Taken By, (c) = Cosigned By      Initials Name Provider Type    Yuliana Lisa, RN Registered Nurse    MH Haase, Mallory L, RN Registered Nurse    Alesha Armenta, PTA Physical Therapist Assistant    Faviola Kunz RN Registered Nurse    Michelle Diaz RN Registered Nurse    Reuben Johnson RN Registered Nurse                    Physical Therapy Education       Title: PT OT SLP Therapies (Done)       Topic: Physical Therapy (Done)       Point: Mobility training (Done)       Learning Progress Summary             Patient Acceptance, E,D, DU,VU by  at 6/30/2024 1453    Comment: benefits of PT and POC, call for A OOB                         Point: Precautions (Done)       Learning Progress Summary             Patient Acceptance, E,D, DU,VU by  at 6/30/2024 1453    Comment: benefits of PT and POC, call for A OOB                                         User Key       Initials Effective Dates Name Provider Type [facility] 02/03/23 -  Daniel Garcia, PT Physical Therapist PT                  PT Recommendation and Plan     Plan of Care Reviewed With: patient  Progress: improving       Time Calculation:    PT Charges       Row Name 07/01/24 1608             Time Calculation    Start Time 1515  -LY      Stop Time 1540  -LY      Time Calculation (min) 25 min  -LY      PT Received On 07/01/24  -LY         Time Calculation- PT    Total Timed Code Minutes- PT 25 minute(s)  -LY          Timed Charges    31014 - PT Therapeutic Exercise Minutes 12  -LY      87904 - Gait Training Minutes  13  -LY         Total Minutes    Timed Charges Total Minutes 25  -LY       Total Minutes 25  -LY                User Key  (r) = Recorded By, (t) = Taken By, (c) = Cosigned By      Initials Name Provider Type    Alesha Armenta PTA Physical Therapist Assistant                  Therapy Charges for Today       Code Description Service Date Service Provider Modifiers Qty    31909808815 HC PT THER PROC EA 15 MIN 7/1/2024 Alesha Dominguez, PARUL GP 1    31502192950 HC GAIT TRAINING EA 15 MIN 7/1/2024 Alesha Dominguez, PARUL GP 1            PT G-Codes  Outcome Measure Options: AM-PAC 6 Clicks Basic Mobility (PT)  AM-PAC 6 Clicks Score (PT): 17    Alesha Dominguez PTA  7/1/2024

## 2024-07-01 NOTE — PROGRESS NOTES
Inpatient Nutrition Services  Patient Name:  Erick Luong  YOB: 1955  MRN: [id number]  Admit Date:  6/28/2024  Assessment Date:  7/1/2024   Reason for Assessment       Row Name 07/01/24 1700          Reason for Assessment    Reason For Assessment per organizational policy;other (see comments)  wound care provider consult     Diagnosis cardiac disease;diabetes diagnosis/complications;infection/sepsis;hematological/related complications;renal disease                    Nutrition/Diet History       Row Name 07/01/24 1700          Nutrition/Diet History    Typical Intake (Food/Fluid/EN/PN) Out of room at time of visit. NPO at time of visit; CLD have been started this afternoon. Colonoscopy planned for tomorrow.     Food Intolerance(s) NKFA                    Labs/Tests/Procedures/Meds       Row Name 07/01/24 1703          Labs/Procedures/Meds    Lab Results Reviewed reviewed     Lab Results Comments Glu, BUN, Cr, PO4, H/H        Diagnostic Tests/Procedures    Diagnostic Test/Procedure Reviewed reviewed        Medications    Pertinent Medications Reviewed reviewed     Pertinent Medications Comments See MAR                    Physical Findings       Row Name 07/01/24 1705          Physical Findings    Overall Physical Appearance Flat affect, room air, 3+ edema: general/arms/wrist/hand/abdomen/legs/ankles/feet, BM 6/30, Kevan Score 17, left wrist skin tear, left plantar wounds anterior and posterior, left lateral diabetic foot ulcer.                    Estimated/Assessed Needs - Anthropometrics       Row Name 07/01/24 1706          Anthropometrics    Weight for Calculation 129 kg (284 lb)        Estimated/Assessed Needs    Additional Documentation Fluid Requirements (Group);KCAL/KG (Group);Protein Requirements (Group)        KCAL/KG    KCAL/KG 14 Kcal/Kg (kcal)     14 Kcal/Kg (kcal) 1803.508        Protein Requirements    Weight Used For Protein Calculations 80.7 kg (178 lb)     Est Protein Requirement  Amount (gms/kg) 0.8 gm protein     Estimated Protein Requirements (gms/day) 64.59        Fluid Requirements    Fluid Requirements (mL/day) 1804     RDA Method (mL) 1804                    Nutrition Prescription Ordered       Row Name 07/01/24 1706          Nutrition Prescription PO    Current PO Diet NPO;Clear Liquid                    Evaluation of Received Nutrient/Fluid Intake       Row Name 07/01/24 1706          Nutrient/Fluid Evaluation    Number of Days Evaluated Other (comment)  insufficient data; admission < 24 hours and NPO/CLD        Fluid Intake Evaluation    Oral Fluid (mL) 970  admit to present total                       Problem/Interventions:     Problem 2       Row Name 07/01/24 1707          Nutrition Diagnoses Problem 2    Problem 2 Inadequate Nutrient Intake     Etiology (related to) MNT for Treatment/Condition     Signs/Symptoms (evidenced by) Clear Liquid Diet;NPO                        Intervention Goal       Row Name 07/01/24 1707          Intervention Goal    General Disease management/therapy;Meet nutritional needs for age/condition;Reduce/improve symptoms     PO Tolerate PO;Establish PO;Meet estimated needs;Advance diet     Weight Appropriate weight loss                    Nutrition Intervention       Row Name 07/01/24 1707          Nutrition Intervention    RD/Tech Action Follow Tx progress;Care plan reviewd;Encourage intake;Await begin PO                    Nutrition Prescription       Row Name 07/01/24 1707          Nutrition Prescription PO    PO Prescription Other (comment)  advance per MD/team                    Education/Evaluation       Row Name 07/01/24 1708          Education    Education No discharge needs identified at this time        Monitor/Evaluation    Monitor Per protocol                     Electronically signed by:  Jie Green RDN, DEEPA  07/01/24 17:09 CDT

## 2024-07-01 NOTE — PLAN OF CARE
Goal Outcome Evaluation:   Alert and oriented. Room air. VSS. 1 L UOP. Bowel prep finished. Four large bowel movements overnight. NPO after midnight for colonoscopy today. Consent signed. Left foot wound dressing changed this morning. Esha x1 for abdominal pain. Abdomen firm and tender. Slept well in chair per patient request.

## 2024-07-01 NOTE — ANESTHESIA POSTPROCEDURE EVALUATION
"Patient: Erick Luong    Procedure Summary       Date: 07/01/24 Room / Location: [facility] ENDOSCOPY 4 / [facility]    Anesthesia Start: 1204 Anesthesia Stop: 1217    Procedure: COLONOSCOPY WITH ANESTHESIA Diagnosis:       Rectal bleeding      Anemia due to stage 3 chronic kidney disease, unspecified whether stage 3a or 3b CKD      (Rectal bleeding [K62.5])      (Anemia due to stage 3 chronic kidney disease, unspecified whether stage 3a or 3b CKD [N18.30, D63.1])    Surgeons: Arsalan Lorenzo DO Provider: Tj Hardwick CRNA    Anesthesia Type: MAC ASA Status: 3            Anesthesia Type: MAC    Vitals  Vitals Value Taken Time   /67 07/01/24 1236   Temp     Pulse 70 07/01/24 1237   Resp 14 07/01/24 1235   SpO2 100 % 07/01/24 1237   Vitals shown include unfiled device data.        Post Anesthesia Care and Evaluation    Patient location during evaluation: PHASE II  Patient participation: complete - patient participated  Level of consciousness: awake and alert  Pain score: 0  Pain management: adequate    Airway patency: patent  Anesthetic complications: No anesthetic complications  PONV Status: none  Cardiovascular status: stable and acceptable  Respiratory status: acceptable  Hydration status: acceptable    Comments: Blood pressure 129/67, pulse 70, temperature 97.5 °F (36.4 °C), temperature source Oral, resp. rate 14, height 182.9 cm (72\"), weight 129 kg (284 lb), SpO2 99%.    No anesthesia care post op    "

## 2024-07-01 NOTE — PAYOR COMM NOTE
"Erick Luong (69 y.o. Male)  [id number]  admit 6/28    [facility] phone     fax          Date of Birth   1955    Social Security Number       Address   [address] [place] [address]    Home Phone   [phone]    MRN   [id number]       Zoroastrianism   [facility]    Marital Status                               Admission Date   6/28/24    Admission Type   Emergency    Admitting Provider   Josué De Oliveira MD    Attending Provider   Josué De Oliveira MD    Department, Room/Bed   [facility] 3A, 352/1       Discharge Date       Discharge Disposition       Discharge Destination                                 Attending Provider: Josué De Oliveira MD    Allergies: Cefepime, Bactrim [Sulfamethoxazole-trimethoprim], Vancomycin, Zolpidem, Metronidazole    Isolation: Contact   Infection: MRSA (05/19/19), COVID (History) (08/08/22), ESBL E coli (06/30/24)   Code Status: CPR    Ht: 182.9 cm (72\")   Wt: 129 kg (284 lb)    Admission Cmt: None   Principal Problem: CHF exacerbation [I50.9]                   Active Insurance as of 6/28/2024       Primary Coverage       Payor Plan Insurance Group Employer/Plan Group    [employer] [id number]       Payor Plan Address Payor Plan Phone Number Payor Plan Fax Number Effective Dates    [address] [phone]  1/1/2022 - None Entered    [facility] [id number]         Subscriber Name Subscriber Birth Date Member ID       ZAINAB LUONG 11/27/1969 [id number]               Secondary Coverage       Payor Plan Insurance Group Employer/Plan Group    MEDICARE MEDICARE A & B        Payor Plan Address Payor Plan Phone Number Payor Plan Fax Number Effective Dates    [address] [phone]  7/1/2013 - None Entered    [facility] [id number]         Subscriber Name Subscriber Birth Date Member ID       ERICK LUONG 1955 [id number]                     Emergency Contacts       "  (Rel.) Home Phone Work Phone Mobile Phone    Joan Luong (Spouse) [phone]                 History & Physical        Arsalan Lorenzo DO at 07/01/24 1203            H&P reviewed. The patient was examined and there are no changes to the H&P.          Electronically signed by Arsalan Lorenzo DO at 07/01/24 1203   Source Note: H&P (View-Only)                  [facility]enter[facility]  Inpatient Consult Note  Today's date:  06/30/24    Erick DONATO Cherise  1955       Referring Provider: Latanya Paez MD  Primary Physician: Del Shetty MD   Primary Gastroenterologist: Dr. Hannah    Date of Admission: 6/28/2024  Date of Service:  06/30/24    Reason for Consultation/Chief Complaint: Pt request.  Scheduled for Colonoscopy on 7/1/24    History of present illness:    Patient is a 69-year-old male with history of HTN, DM, HLD and diastolic CHF who is scheduled for a colonoscopy with Dr. Hannah on 7/1/2024 to evaluate his constipation and abdominal pain, and was admitted yesterday with CHF noting new redevelopment bilateral pleural effusions.  She was evaluated by cardiology and has been receiving IV Lasix and cleared for endoscopic procedures.  Her creatinine is elevated but stable.  His colonoscopy on 4/7/2024 was to the transverse colon due to poor prep and revealed rectosigmoid ulcers with visible vessels that were clipped.  The patient was rescheduled for follow-up colonoscopy on 7/1/2024.  Since his last colonoscopy he was evaluated in the emergency room on 5/20/2024 for abdominal pain and possible rectal bleeding.  His Hgb was actually increased to 10.8 compared with 9.3 on 5/3/2024.  CT abdomen/pelvis at that time did not reveal any acute GI findings and a rectal clip was noted.  Of note that CT revealed a resolution of his pre-existing pleural effusions.  The patient reports that he has persistent abdominal pain and may have vomited a small amount of  dark blood/coffee-ground appearance.  His Hgb on this admission was 10.3 and stable on follow-up with Hgb of 10.6 today.  Patient reports 3 months of early satiety with regular postprandial vomiting and regurgitation of old food his Hgb A1c was 11.2 on 5/3/2024 and has been persistently elevated at least over the last 3 years.  He rarely vomits in between meals.  EGD on 4/11/2024 revealed mild esophagitis and gastritis.  He reports 2 weeks of obstipation despite MiraLAX and lactulose use.  CT abdomen/pelvis on 6/27/2024 reveals a colon and rectum full of stool.  Lastly he reports generalized abdominal pain after falling and hitting his abdomen on a stool.  He does feel that it is muscular in nature.         Past Medical History:   Diagnosis Date    Arthritis     Autonomic disease     CAD (coronary artery disease) 02/06/2017    Cervical radiculopathy 09/16/2021    Chronic constipation with acute exaccerbation 05/10/2021    Coronary artery disease     Degeneration of cervical intervertebral disc 08/11/2021    Diabetes mellitus     Diabetic foot ulcer 08/31/2020    Diabetic polyneuropathy associated with type 2 diabetes mellitus 01/18/2021    Elevated cholesterol     Gastroesophageal reflux disease 05/13/2019    Headache     HTN (hypertension), benign 05/03/2017    Hyperlipidemia     Hypertension     Mixed hyperlipidemia 02/07/2017    Multiple lung nodules 01/26/2020    5mm, 9 mm RLL identified 1/2020, not present 10/2019.    Myocardial infarction     Osteomyelitis 01/22/2020    Osteomyelitis of fifth toe of right foot 10/07/2019    Pancreatitis     Persistent insomnia 01/20/2020    Renal disorder     Sleep apnea 02/06/2017    Sleep apnea with use of continuous positive airway pressure (CPAP)     NON-COMPLIANT    Slow transit constipation 01/16/2019    Spinal stenosis in cervical region 09/16/2021    Vitamin D deficiency 03/02/2021       Past Surgical History:   Procedure Laterality Date    ABDOMINAL SURGERY       AMPUTATION FOOT / TOE Left 10/2021    5th digit     ANTERIOR CERVICAL DISCECTOMY W/ FUSION N/A 08/05/2022    Procedure: CERVICAL DISCECTOMY ANTERIOR WITH FUSION C5-6 with possible plating of C5-7 with neuromonitoring and 1 c-arm;  Surgeon: Karel Soliz MD;  Location:  [facility] OR;  Service: Neurosurgery;  Laterality: N/A;    APPENDECTOMY      BACK SURGERY      CARDIAC CATHETERIZATION Left 02/08/2021    Procedure: Left Heart Cath w poss intervention left anatomical snuff box acess;  Surgeon: Omkar Charles DO;  Location:  [facility] CATH INVASIVE LOCATION;  Service: Cardiology;  Laterality: Left;    CARDIAC SURGERY      CATARACT EXTRACTION      CERVICAL SPINE SURGERY      COLONOSCOPY N/A 01/31/2017    Normal exam repeat in 5 years    COLONOSCOPY N/A 02/11/2019    Mild acute inflammation    COLONOSCOPY N/A 04/07/2024    2 areas at 10 and 20 cm with friability ulceration 2 clips placed at 20 cm and 4 clips at 10 cm poor prep normal mucosa,mild eroisions and ulcerations in visible vessels    COLONOSCOPY W/ POLYPECTOMY  03/04/2014    Hyperplastic polyp    CORONARY ARTERY BYPASS GRAFT  10/2015    ENDOSCOPY  04/13/2011    Gastritis with hemorrhage    ENDOSCOPY N/A 05/05/2017    Normal exam    ENDOSCOPY N/A 02/11/2019    Gastritis    ENDOSCOPY N/A 09/01/2020    Non-erosive gastritis with hemorrhage    ENDOSCOPY N/A 02/10/2021    Esophagitis    ENDOSCOPY N/A 04/11/2024    There were esophageal mucosal changes suspicious for short-segment Low's esophagus present in the distal esophagus. The maximum longitudinal extent of these mucosal changes was 2 cm in length. Mucosa was biopsied with a cold forceps for histologyDistal esophagus, biopsies: Mild chronic active esophagogastritis. No evidence of intestinal metaplasia, dysplasia. Antrum, bx, Mild chronic gastritis    FOOT SURGERY Left     INCISION AND DRAINAGE OF WOUND Left 09/2007    spider bite        Allergies   Allergen Reactions    Cefepime Hives and  "Anaphylaxis    Bactrim [Sulfamethoxazole-Trimethoprim] Other (See Comments)     \"RENAL FAILURE\"    Vancomycin Itching    Zolpidem Mental Status Change     \"makes him crazy\"    Metronidazole Rash       Medications Prior to Admission   Medication Sig Dispense Refill Last Dose    ascorbic acid (VITAMIN C) 1000 MG tablet Take 1 tablet by mouth Daily. 30 tablet 3 Past Week    bumetanide (BUMEX) 2 MG tablet Take 1 tablet by mouth Daily. 90 tablet 0 Past Week    busPIRone (BUSPAR) 10 MG tablet Take 1 tablet by mouth 2 (Two) Times a Day.   Past Week    calcitriol (ROCALTROL) 0.5 MCG capsule Take 1 capsule by mouth Daily. 90 capsule 4 Past Week    carvedilol (COREG) 3.125 MG tablet Take 1 tablet twice a day by oral route. 60 tablet 4 Past Week    Diclofenac Sodium (VOLTAREN) 1 % gel gel Apply 2 g topically to the appropriate area as directed 4 (Four) Times a Day As Needed. 300 g 11 Past Month    donepezil (ARICEPT) 10 MG tablet Take 1 tablet by mouth Daily. 90 tablet 1 Past Week    DULoxetine (CYMBALTA) 60 MG capsule Take 1 capsule by mouth Daily. 90 capsule 4 Past Week    famotidine (PEPCID) 20 MG tablet Take 1 tablet twice a day by oral route. 180 tablet 4 Past Week    fluticasone (FLONASE) 50 MCG/ACT nasal spray Instill 1 spray in each nostril as directed by provider Daily. 16 g 1 Past Week    Insulin Glargine (Lantus SoloStar) 100 UNIT/ML injection pen Inject 20 Units under the skin into the appropriate area as directed every night at bedtime. 15 mL 3 Past Week    Insulin Regular Human, Conc, (HumuLIN R) 500 UNIT/ML solution pen-injector CONCENTRATED injection Inject 15 Units under the skin into the appropriate area as directed 3 (Three) Times a Day Before Meals.   Past Week    Insulin Regular Human, Conc, (HumuLIN R) 500 UNIT/ML solution pen-injector CONCENTRATED injection Inject 40 Units under the skin into the appropriate area as directed 3 (Three) Times a Day Before Meals. Inject 40 units under the skin in the the " appropriate area with regular meals AND 40 units with large meals.   Past Week    Iron-Vitamin C (Vitron-C)  MG tablet Take 1 tablet twice a day by oral route. 60 tablet 2 Past Week    lactulose (Enulose) 10 GM/15ML solution solution (encephalopathy) Take 30 mL by mouth 3 times a day for 30 days. 2838 mL 11 Past Week    levocetirizine (XYZAL) 5 MG tablet Take 1 tablet by mouth every day at bedtime. 30 tablet 2 Past Week    levoFLOXacin (LEVAQUIN) 750 MG tablet Take 1 tablet by mouth Daily. 7 tablet 0 Past Week    melatonin 3 MG tablet Take 2 tablets by mouth At Night As Needed for Sleep. 30 tablet 0 Past Week    midodrine (PROAMATINE) 10 MG tablet Take 1 tablet by mouth 3 (Three) Times a Day. 270 tablet 4 Past Week    oxyCODONE (ROXICODONE) 10 MG tablet Take 1 tablet by mouth 2 (Two) Times a Day As Needed---MUST last 30 days 55 tablet 0 Past Week    pantoprazole (PROTONIX) 40 MG EC tablet Take 1 tablet by mouth Every 12 (Twelve) Hours. 180 tablet 4 Past Week    polyethylene glycol (MIRALAX) 17 GM/SCOOP powder Take 17 g by mouth Daily As Needed (constipation).   Past Week    pregabalin (LYRICA) 100 MG capsule Take 2 capsules by mouth Every Night.   Past Week    pregabalin (LYRICA) 50 MG capsule Take 1 capsule by mouth Daily.   Past Week    rosuvastatin (CRESTOR) 10 MG tablet Take 1 tablet by mouth Every Night. 30 tablet 2 Past Week    sennosides-docusate (PERICOLACE) 8.6-50 MG per tablet Take 1 tablet by mouth Every Night. Obtain OTC   Past Week    sodium hypochlorite (DAKIN'S 1/4 STRENGTH) 0.125 % solution topical solution 0.125% Apply 1 application topically to the appropriate area as directed 2 (Two) Times a Day. 473 mL 5 Past Week    sucralfate (CARAFATE) 1 g tablet Take 1 tablet by mouth 4 (Four) Times a Day before meals. 360 tablet 1 Past Week    tamsulosin (FLOMAX) 0.4 MG capsule 24 hr capsule Take 1 capsule by mouth Daily. 90 capsule 1 Past Week    valsartan (DIOVAN) 40 MG tablet Take 1 tablet by mouth  "Every Night. 30 tablet 2 Past Week    zinc sulfate (ZINCATE) 50 MG capsule Take 1 capsule by mouth Daily.   Past Week    nitroglycerin (NITROSTAT) 0.4 MG SL tablet Place 1 tablet under the tongue Every 5 (Five) Minutes As Needed for Chest Pain. Take no more than 3 doses in 15 minutes.   Unknown       Hospital Medications (active)         Dose Frequency Start End    acetaminophen (TYLENOL) 160 MG/5ML oral solution 650 mg 650 mg Every 4 Hours PRN 6/28/2024 --    Admin Instructions: If given for fever, use fever parameter: fever greater than 100.4 °F  Based on patient request - if ordered for moderate or severe pain, provider allows for administration of a medication prescribed for a lower pain scale.    Do not exceed 4 grams of acetaminophen in a 24 hr period. Max dose of 2gm for AST/ALT greater than 120 units/L.    If given for pain, use the following pain scale:   Mild Pain = Pain Score of 1-3, CPOT 1-2  Moderate Pain = Pain Score of 4-6, CPOT 3-4  Severe Pain = Pain Score of 7-10, CPOT 5-8    Route: Oral    Linked Group 1: Placed in \"Or\" Linked Group        acetaminophen (TYLENOL) suppository 650 mg 650 mg Every 4 Hours PRN 6/28/2024 --    Admin Instructions: If given for fever, use fever parameter: fever greater than 100.4 °F  Based on patient request - if ordered for moderate or severe pain, provider allows for administration of a medication prescribed for a lower pain scale.    Do not exceed 4 grams of acetaminophen in a 24 hr period. Max dose of 2gm for AST/ALT greater than 120 units/L.    If given for pain, use the following pain scale:   Mild Pain = Pain Score of 1-3, CPOT 1-2  Moderate Pain = Pain Score of 4-6, CPOT 3-4  Severe Pain = Pain Score of 7-10, CPOT 5-8    Route: Rectal    Linked Group 1: Placed in \"Or\" Linked Group        acetaminophen (TYLENOL) tablet 650 mg 650 mg Every 4 Hours PRN 6/28/2024 --    Admin Instructions: If given for fever, use fever parameter: fever greater than 100.4 °F  Based on " "patient request - if ordered for moderate or severe pain, provider allows for administration of a medication prescribed for a lower pain scale.    Do not exceed 4 grams of acetaminophen in a 24 hr period. Max dose of 2gm for AST/ALT greater than 120 units/L.    If given for pain, use the following pain scale:   Mild Pain = Pain Score of 1-3, CPOT 1-2  Moderate Pain = Pain Score of 4-6, CPOT 3-4  Severe Pain = Pain Score of 7-10, CPOT 5-8    Route: Oral    Linked Group 1: Placed in \"Or\" Linked Group        bisacodyl (DULCOLAX) EC tablet 5 mg 5 mg Daily PRN 6/28/2024 --    Admin Instructions: Use if no bowel movement after 12 hours.  Swallow whole. Do not crush, split, or chew tablet.    Route: Oral    Linked Group 2: Placed in \"And\" Linked Group        bisacodyl (DULCOLAX) suppository 10 mg 10 mg Daily PRN 6/28/2024 --    Admin Instructions: Use if no bowel movement after 12 hours.  Hold for diarrhea    Route: Rectal    Linked Group 2: Placed in \"And\" Linked Group        bumetanide (BUMEX) injection 1 mg 1 mg 2 Times Daily (Diuretics) 6/29/2024 --    Admin Instructions: Hold for SBP less than 100, DBP less than 60.  Give slow IV push over 1-2 minutes.    Route: Intravenous    calcitriol (ROCALTROL) capsule 0.5 mcg 0.5 mcg Daily 6/29/2024 --    Route: Oral    Calcium Replacement - Follow Nurse / BPA Driven Protocol  As Needed 6/28/2024 --    Admin Instructions: Open Order & Select \"BHS Electrolyte Replacement Protocol Algorithm\" to View Details    Route: Does not apply    carvedilol (COREG) tablet 3.125 mg 3.125 mg 2 Times Daily With Meals 6/29/2024 --    Admin Instructions: Hold for SBP less than 100, DBP less than 60, or heart rate less than 50. If a dose is held, please contact the provider.  Give with food.    Route: Oral    cetirizine (zyrTEC) tablet 10 mg 10 mg Daily 6/29/2024 --    Route: Oral    dextrose (D50W) (25 g/50 mL) IV injection 25 g 25 g Every 15 Minutes PRN 6/28/2024 --    Admin Instructions: Blood " sugar less than 70; patient has IV access - Unresponsive, NPO or Unable To Safely Swallow    Route: Intravenous    dextrose (GLUTOSE) oral gel 15 g 15 g Every 15 Minutes PRN 6/28/2024 --    Admin Instructions: BS<70, Patient Alert, Is not NPO, Can safely swallow.    Route: Oral    DULoxetine (CYMBALTA) DR capsule 60 mg 60 mg Daily 6/29/2024 --    Admin Instructions: Do not crush or chew the capsules or tablets. The drug may not work as designed if the capsule or tablet is crushed or chewed. Swallow whole.  Caution: Look alike/sound alike drug alert. Capsule may be opened and sprinkled on applesauce or apple juice. Do not crush or chew capsule.    Route: Oral    ertapenem (INVanz) 1,000 mg in sodium chloride 0.9 % 100 mL MBP 1,000 mg Every 24 Hours 6/29/2024 7/4/2024    Admin Instructions: Caution: Look alike/sound alike drug alert.    Route: Intravenous    fluticasone (FLONASE) 50 MCG/ACT nasal spray 1 spray 1 spray Daily 6/29/2024 --    Route: Nasal    glucagon (GLUCAGEN) injection 1 mg 1 mg Every 15 Minutes PRN 6/28/2024 --    Admin Instructions: Blood Glucose Less Than 70 - Patient Without IV Access - Unresponsive, NPO or Unable To Safely Swallow  Reconstitute powder for injection by adding 1 mL of -supplied sterile diluent or sterile water for injection to a vial containing 1 mg of the drug, to provide solutions containing 1 mg/mL. Shake vial gently to dissolve.    Route: Intramuscular    HYDROcodone-acetaminophen (NORCO)  MG per tablet 1 tablet 1 tablet Every 6 Hours PRN 6/29/2024 7/4/2024    Admin Instructions: Based on patient request - if ordered for moderate or severe pain, provider allows for administration of a medication prescribed for a lower pain scale.  [JOSE]    Do not exceed 4 grams of acetaminophen in a 24 hr period. Max dose of 2gm for AST/ALT greater than 120 units/L        If given for pain, use the following pain scale:   Mild Pain = Pain Score of 1-3, CPOT 1-2  Moderate Pain =  "Pain Score of 4-6, CPOT 3-4  Severe Pain = Pain Score of 7-10, CPOT 5-8    Route: Oral    insulin glargine (LANTUS, SEMGLEE) injection 25 Units 25 Units Nightly 6/30/2024 --    Admin Instructions:     Route: Subcutaneous    Insulin Lispro (humaLOG) injection 2-7 Units 2-7 Units 4 Times Daily Before Meals & Nightly 6/29/2024 --    Admin Instructions: Correction Insulin - Low Dose - Total Insulin Dose Less Than 40 units/day (Lean, Elderly or Renal Patients)    Blood Glucose 150-199 mg/dL - 2 units  Blood Glucose 200-249 mg/dL - 3 units  Blood Glucose 250-299 mg/dL - 4 units  Blood Glucose 300-349 mg/dL - 5 units  Blood Glucose 350-400 mg/dL - 6 units  Blood Glucose Greater Than 400 mg/dL - 7 units & Call Provider   Caution: Look alike/sound alike drug alert    Route: Subcutaneous    isosorbide mononitrate (IMDUR) 24 hr tablet 30 mg 30 mg Daily 6/29/2024 --    Admin Instructions: Do not crush or chew the capsules or tablets. The drug may not work as designed if the capsule or tablet is crushed or chewed. Swallow whole.    Route: Oral    Magnesium Standard Dose Replacement - Follow Nurse / BPA Driven Protocol  As Needed 6/28/2024 --    Admin Instructions: Open Order & Select \"BHS Electrolyte Replacement Protocol Algorithm\" to View Details    Route: Does not apply    nitroglycerin (NITROSTAT) SL tablet 0.4 mg 0.4 mg Every 5 Minutes PRN 6/28/2024 --    Admin Instructions: If Pain Unrelieved After 3 Doses Notify MD  May administer up to 3 doses per episode.    Route: Sublingual    ondansetron (ZOFRAN) injection 4 mg 4 mg Every 6 Hours PRN 6/28/2024 --    Admin Instructions: If BOTH ondansetron (ZOFRAN) and promethazine (PHENERGAN) are ordered use ondansetron first and THEN promethazine IF ondansetron is ineffective.    Route: Intravenous    oxyCODONE (ROXICODONE) immediate release tablet 10 mg 10 mg 2 Times Daily PRN 6/29/2024 7/4/2024    Admin Instructions: Based on patient request - if ordered for moderate or severe " "pain, provider allows for administration of a medication prescribed for a lower pain scale.      If given for pain, use the following pain scale:  Mild Pain = Pain Score of 1-3, CPOT 1-2  Moderate Pain = Pain Score of 4-6, CPOT 3-4  Severe Pain = Pain Score of 7-10, CPOT 5-8    Route: Oral    pantoprazole (PROTONIX) EC tablet 40 mg 40 mg Every Morning Before Breakfast 6/29/2024 --    Admin Instructions: Do not crush or chew the capsules or tablets. The drug may not work as designed if the capsule or tablet is crushed or chewed. Swallow whole.  Swallow whole; do not crush, split, or chew.    Route: Oral    Phosphorus Replacement - Follow Nurse / BPA Driven Protocol  As Needed 6/28/2024 --    Admin Instructions: Open Order & Select \"BHS Electrolyte Replacement Protocol Algorithm\" to View Details    Route: Does not apply    polyethylene glycol (GoLYTELY) solution 4,000 mL 4,000 mL Once 6/30/2024 --    Admin Instructions: MIX entire container.  Give 2000 mL (1/2 bottle) for first dose at bedtime.  Store the remaining solution for next dose in refrigerator (may be refigerated for 48 hours).  Give 2nd dose from same bottle at 0600 or several hours prior to procedure.  Adjust administration times as indicated.    Route: Oral    polyethylene glycol (MIRALAX) packet 17 g 17 g Daily PRN 6/28/2024 --    Admin Instructions: Use if no bowel movement after 12 hours. Mix in 6-8 ounces of water.  Use 4-8 ounces of water, tea, or juice for each 17 gram dose.    Route: Oral    Linked Group 2: Placed in \"And\" Linked Group        Potassium Replacement - Follow Nurse / BPA Driven Protocol  As Needed 6/28/2024 --    Admin Instructions: Open Order & Select \"BHS Electrolyte Replacement Protocol Algorithm\" to View Details    Route: Does not apply    pregabalin (LYRICA) capsule 100 mg 100 mg Nightly 6/29/2024 --    Admin Instructions:     Route: Oral    pregabalin (LYRICA) capsule 50 mg 50 mg Daily 6/29/2024 --    Admin Instructions:     " "Route: Oral    rosuvastatin (CRESTOR) tablet 10 mg 10 mg Daily 6/29/2024 --    Admin Instructions: Avoid grapefruit juice.    Route: Oral    sennosides-docusate (PERICOLACE) 8.6-50 MG per tablet 2 tablet 2 tablet 2 Times Daily PRN 6/28/2024 --    Admin Instructions: Start bowel management regimen if patient has not had a bowel movement after 12 hours.    Route: Oral    Linked Group 2: Placed in \"And\" Linked Group        sodium chloride 0.9 % flush 10 mL 10 mL Every 12 Hours Scheduled 6/28/2024 --    Route: Intravenous    sodium chloride 0.9 % flush 10 mL 10 mL As Needed 6/28/2024 --    Route: Intravenous    sodium chloride 0.9 % infusion 40 mL 40 mL As Needed 6/28/2024 --    Admin Instructions: Following administration of an IV intermittent medication, flush line with 40mL NS at 100mL/hr.    Route: Intravenous    tamsulosin (FLOMAX) 24 hr capsule 0.4 mg 0.4 mg Daily 6/29/2024 --    Admin Instructions: Do not crush or chew the capsules or tablets. The drug may not work as designed if the capsule or tablet is crushed or chewed. Swallow whole. If patient unable to swallow whole, contact pharmacy for alternative.    Route: Oral    timolol (TIMOPTIC) 0.5 % ophthalmic solution 1 drop 1 drop 2 Times Daily 6/29/2024 --    Route: Both Eyes    valsartan (DIOVAN) tablet 40 mg ([Held by provider] since 6/29/2024 11:25 AM) 40 mg Daily 6/29/2024 --    Admin Instructions: Hold for SBP less than 100, DBP less than 60, or heart rate less than 50. If a dose is held, please contact the provider.    Route: Oral    valsartan (DIOVAN) tablet 40 mg ([Held by provider] since 6/29/2024 11:25 AM) 40 mg Nightly 6/29/2024 --    Admin Instructions: Hold for SBP less than 100, DBP less than 60, or heart rate less than 50. If a dose is held, please contact the provider.    Route: Oral            Social History     Tobacco Use    Smoking status: Former     Current packs/day: 0.00     Types: Cigarettes     Quit date: 1991     Years since quitting: " 33.5    Smokeless tobacco: Never    Tobacco comments:     smoked in highschool   Substance Use Topics    Alcohol use: No        Past Family History:  Family History   Problem Relation Age of Onset    Colon cancer Father     Heart disease Father     Colon cancer Sister     Colon polyps Sister     Alzheimer's disease Mother     Coronary artery disease Sister     Coronary artery disease Sister        Review of Systems:  12 point inspection performed with pertinent positives in HPI.    Physical Exam:  Temp:  [97.4 °F (36.3 °C)-97.7 °F (36.5 °C)] 97.5 °F (36.4 °C)  Heart Rate:  [63-78] 66  Resp:  [16-18] 16  BP: (104-130)/(55-76) 111/61  Body mass index is 38.49 kg/m².    Intake/Output Summary (Last 24 hours) at 6/30/2024 1251  Last data filed at 6/30/2024 0941  Gross per 24 hour   Intake 420 ml   Output 3500 ml   Net -3080 ml     I/O this shift:  In: 100 [P.O.:100]  Out: 750 [Urine:750]    General appearance:   HEENT: Nonicteric sclerae.  EOMI.   Lungs: Good expansion bilaterally  Heart: Regular rate and rhythm.   Abdomen: Soft, nondistended, mild generalized tenderness, no R/G.  No palpable masses.  Extremities: No cyanosis, edema or pulse deficits.  Skin: No rash or jaundice.    Results Review:  Lab Results (last 24 hours)       Procedure Component Value Units Date/Time    Vitamin D,25-Hydroxy [[id number]]  (Normal) Collected: 06/30/24 0638    Specimen: Blood Updated: 06/30/24 1211     25 Hydroxy, Vitamin D 32.8 ng/ml     Narrative:      Reference Range for Total Vitamin D 25(OH)     Deficiency <20.0 ng/mL   Insufficiency 21-29 ng/mL   Sufficiency  ng/mL  Toxicity >100 ng/ml      POC Glucose Once [[id number]]  (Abnormal) Collected: 06/30/24 1133    Specimen: Blood Updated: 06/30/24 1203     Glucose 281 mg/dL      Comment: : [id number] Sanford Meleer ID: [id number]       Creatinine Urine Random (kidney function) GFR component - Urine, Clean Catch [[id number]] Collected: 06/29/24 1845    Specimen: Urine, Clean  Catch Updated: 06/30/24 1149     Creatinine, Urine 17.0 mg/dL     Narrative:      Reference intervals for random urine have not been established.  Clinical usage is dependent upon physician's interpretation in combination with other laboratory tests.       Urea Nitrogen, Urine - Urine, Clean Catch [[id number]] Collected: 06/29/24 1845    Specimen: Urine, Clean Catch Updated: 06/30/24 1149     Urea Nitrogen, Urine 169 mg/dL     Narrative:      Reference intervals for random urine have not been established.  Clinical usage is dependent upon physician's interpretation in combination with other laboratory tests.       POC Glucose Once [[id number]]  (Abnormal) Collected: 06/30/24 0751    Specimen: Blood Updated: 06/30/24 0805     Glucose 254 mg/dL      Comment: : [id number] Sanford KimMeter ID: [id number]       PTH, Intact [[id number]]  (Normal) Collected: 06/30/24 0638    Specimen: Blood Updated: 06/30/24 0722     PTH, Intact 41.7 pg/mL     Narrative:      Results may be falsely decreased if patient taking Biotin.      Basic Metabolic Panel [[id number]]  (Abnormal) Collected: 06/30/24 0638    Specimen: Blood Updated: 06/30/24 0710     Glucose 230 mg/dL      BUN 35 mg/dL      Creatinine 1.86 mg/dL      Sodium 140 mmol/L      Potassium 3.9 mmol/L      Chloride 103 mmol/L      CO2 26.0 mmol/L      Calcium 9.1 mg/dL      BUN/Creatinine Ratio 18.8     Anion Gap 11.0 mmol/L      eGFR 38.9 mL/min/1.73     Narrative:      GFR Normal >60  Chronic Kidney Disease <60  Kidney Failure <15      Phosphorus [[id number]]  (Abnormal) Collected: 06/30/24 0638    Specimen: Blood Updated: 06/30/24 0710     Phosphorus 4.6 mg/dL     Magnesium [[id number]]  (Normal) Collected: 06/30/24 0638    Specimen: Blood Updated: 06/30/24 0710     Magnesium 2.0 mg/dL     CBC & Differential [[id number]]  (Abnormal) Collected: 06/30/24 0638    Specimen: Blood Updated: 06/30/24 0649    Narrative:      The following orders were created for panel order CBC &  Differential.  Procedure                               Abnormality         Status                     ---------                               -----------         ------                     CBC Auto Differential[[id number]]        Abnormal            Final result                 Please view results for these tests on the individual orders.    CBC Auto Differential [[id number]]  (Abnormal) Collected: 06/30/24 0638    Specimen: Blood Updated: 06/30/24 0649     WBC 6.79 10*3/mm3      RBC 3.92 10*6/mm3      Hemoglobin 10.6 g/dL      Hematocrit 34.1 %      MCV 87.0 fL      MCH 27.0 pg      MCHC 31.1 g/dL      RDW 14.7 %      RDW-SD 47.4 fl      MPV 11.7 fL      Platelets 238 10*3/mm3      Neutrophil % 58.1 %      Lymphocyte % 23.0 %      Monocyte % 11.3 %      Eosinophil % 6.5 %      Basophil % 0.7 %      Immature Grans % 0.4 %      Neutrophils, Absolute 3.94 10*3/mm3      Lymphocytes, Absolute 1.56 10*3/mm3      Monocytes, Absolute 0.77 10*3/mm3      Eosinophils, Absolute 0.44 10*3/mm3      Basophils, Absolute 0.05 10*3/mm3      Immature Grans, Absolute 0.03 10*3/mm3      nRBC 0.0 /100 WBC     POC Glucose Once [[id number]]  (Abnormal) Collected: 06/29/24 2107    Specimen: Blood Updated: 06/29/24 2118     Glucose 250 mg/dL      Comment: : [id number] Mao WhitneyMeter ID: [id number]       Sodium, Urine, Random - Urine, Clean Catch [[id number]] Collected: 06/29/24 1845    Specimen: Urine, Clean Catch Updated: 06/29/24 1927     Sodium, Urine 120 mmol/L     Narrative:      Reference intervals for random urine have not been established.  Clinical usage is dependent upon physician's interpretation in combination with other laboratory tests.       Protein, Urine, Random - Urine, Clean Catch [[id number]] Collected: 06/29/24 1845    Specimen: Urine, Clean Catch Updated: 06/29/24 1927     Total Protein, Urine 34.9 mg/dL     Narrative:      Reference intervals for random urine have not been established.  Clinical usage is dependent  upon physician's interpretation in combination with other laboratory tests.       POC Glucose Once [[id number]]  (Abnormal) Collected: 06/29/24 1617    Specimen: Blood Updated: 06/29/24 1638     Glucose 283 mg/dL      Comment: : [id number] Wright ID: [id number]               Radiology Review:  Imaging Results (Last 72 Hours)       Procedure Component Value Units Date/Time    XR Chest 1 View [[id number]] Collected: 06/28/24 2115     Updated: 06/28/24 2119    Narrative:      EXAM: XR CHEST 1 VW- 6/28/2024 7:03 PM     HISTORY: [facility] Triage Protocol       COMPARISON: 5/3/2024.     TECHNIQUE: Single frontal radiograph of the chest was obtained.     FINDINGS:     Support Devices: Prior median sternotomy.     Cardiac and Mediastinal Silhouettes: Normal.     Lungs/Pleura: Mild interstitial prominence. Suspected small right  pleural effusion. No visible pneumothorax.     Osseous structures: No acute osseous finding.     Other: None.       Impression:         Suspected small left pleural effusion.     Mild interstitial prominence may suggest mild edema.           This report was signed and finalized on 6/28/2024 9:16 PM by Ronal Wisdom.               Impression/Plan:    CHF exacerbation    CHF (congestive heart failure), NYHA class I, acute on chronic, combined      1.  Obstipation/last colonoscopy noting rectosigmoid ulcers most likely stercoral.  Status post endoclips for high risk stigmata.  Hgb has been stable.  No significant rectal bleeding.  Plan colonoscopy for tomorrow to follow-up last colonoscopy with poor prep.  Patient has been cleared by cardiology and is currently drinking a prep.  Hopefully he will be prepped due to his obstipation.    -Colonoscopy in a.m.      2.  Nausea/vomiting/old food/prior meals noted.  Last EGD without any significant pathology.  Poorly controlled DM with elevated Hgb A1c.    -Glucose control  -Follow-up with endocrinologist  -Consider gastric emptying scan  -Consider  Reglan with bowel prep if he is unable to tolerate      3.  Pleural effusions/CHF managed by cardiology with diuretics    -Continue supportive care.  Patient cleared for endoscopic studies.    Talon Rust MD  06/30/24   12:51 CDT    DISCLAIMER:    This physician works through a [employer] as an inpatient consultant gastroenterologist only and has no outpatient clinic for patient follow up.  Any results not available at time of inpatient discharge and/or GI clinic follow up should be managed by the hospitalist team, PCP, or outpatient gastroenterologist.        Electronically signed by Talon Rust MD at 06/30/24 1525                 Talon Rust MD at 06/30/24 1251                  [facility] Gastroenterology  Inpatient Consult Note  Today's date:  06/30/24    Erick Luong  1955       Referring Provider: Latanya Paez MD  Primary Physician: Del Shetty MD   Primary Gastroenterologist: Dr. Hannah    Date of Admission: 6/28/2024  Date of Service:  06/30/24    Reason for Consultation/Chief Complaint: Pt request.  Scheduled for Colonoscopy on 7/1/24    History of present illness:    Patient is a 69-year-old male with history of HTN, DM, HLD and diastolic CHF who is scheduled for a colonoscopy with Dr. Hannah on 7/1/2024 to evaluate his constipation and abdominal pain, and was admitted yesterday with CHF noting new redevelopment bilateral pleural effusions.  She was evaluated by cardiology and has been receiving IV Lasix and cleared for endoscopic procedures.  Her creatinine is elevated but stable.  His colonoscopy on 4/7/2024 was to the transverse colon due to poor prep and revealed rectosigmoid ulcers with visible vessels that were clipped.  The patient was rescheduled for follow-up colonoscopy on 7/1/2024.  Since his last colonoscopy he was evaluated in the emergency room on 5/20/2024 for abdominal pain and possible rectal bleeding.  His Hgb was actually increased to 10.8 compared  with 9.3 on 5/3/2024.  CT abdomen/pelvis at that time did not reveal any acute GI findings and a rectal clip was noted.  Of note that CT revealed a resolution of his pre-existing pleural effusions.  The patient reports that he has persistent abdominal pain and may have vomited a small amount of dark blood/coffee-ground appearance.  His Hgb on this admission was 10.3 and stable on follow-up with Hgb of 10.6 today.  Patient reports 3 months of early satiety with regular postprandial vomiting and regurgitation of old food his Hgb A1c was 11.2 on 5/3/2024 and has been persistently elevated at least over the last 3 years.  He rarely vomits in between meals.  EGD on 4/11/2024 revealed mild esophagitis and gastritis.  He reports 2 weeks of obstipation despite MiraLAX and lactulose use.  CT abdomen/pelvis on 6/27/2024 reveals a colon and rectum full of stool.  Lastly he reports generalized abdominal pain after falling and hitting his abdomen on a stool.  He does feel that it is muscular in nature.         Past Medical History:   Diagnosis Date    Arthritis     Autonomic disease     CAD (coronary artery disease) 02/06/2017    Cervical radiculopathy 09/16/2021    Chronic constipation with acute exaccerbation 05/10/2021    Coronary artery disease     Degeneration of cervical intervertebral disc 08/11/2021    Diabetes mellitus     Diabetic foot ulcer 08/31/2020    Diabetic polyneuropathy associated with type 2 diabetes mellitus 01/18/2021    Elevated cholesterol     Gastroesophageal reflux disease 05/13/2019    Headache     HTN (hypertension), benign 05/03/2017    Hyperlipidemia     Hypertension     Mixed hyperlipidemia 02/07/2017    Multiple lung nodules 01/26/2020    5mm, 9 mm RLL identified 1/2020, not present 10/2019.    Myocardial infarction     Osteomyelitis 01/22/2020    Osteomyelitis of fifth toe of right foot 10/07/2019    Pancreatitis     Persistent insomnia 01/20/2020    Renal disorder     Sleep apnea 02/06/2017     Sleep apnea with use of continuous positive airway pressure (CPAP)     NON-COMPLIANT    Slow transit constipation 01/16/2019    Spinal stenosis in cervical region 09/16/2021    Vitamin D deficiency 03/02/2021       Past Surgical History:   Procedure Laterality Date    ABDOMINAL SURGERY      AMPUTATION FOOT / TOE Left 10/2021    5th digit     ANTERIOR CERVICAL DISCECTOMY W/ FUSION N/A 08/05/2022    Procedure: CERVICAL DISCECTOMY ANTERIOR WITH FUSION C5-6 with possible plating of C5-7 with neuromonitoring and 1 c-arm;  Surgeon: Karel Soliz MD;  Location:  [facility] OR;  Service: Neurosurgery;  Laterality: N/A;    APPENDECTOMY      BACK SURGERY      CARDIAC CATHETERIZATION Left 02/08/2021    Procedure: Left Heart Cath w poss intervention left anatomical snuff box acess;  Surgeon: Omkar Charles DO;  Location:  [facility] CATH INVASIVE LOCATION;  Service: Cardiology;  Laterality: Left;    CARDIAC SURGERY      CATARACT EXTRACTION      CERVICAL SPINE SURGERY      COLONOSCOPY N/A 01/31/2017    Normal exam repeat in 5 years    COLONOSCOPY N/A 02/11/2019    Mild acute inflammation    COLONOSCOPY N/A 04/07/2024    2 areas at 10 and 20 cm with friability ulceration 2 clips placed at 20 cm and 4 clips at 10 cm poor prep normal mucosa,mild eroisions and ulcerations in visible vessels    COLONOSCOPY W/ POLYPECTOMY  03/04/2014    Hyperplastic polyp    CORONARY ARTERY BYPASS GRAFT  10/2015    ENDOSCOPY  04/13/2011    Gastritis with hemorrhage    ENDOSCOPY N/A 05/05/2017    Normal exam    ENDOSCOPY N/A 02/11/2019    Gastritis    ENDOSCOPY N/A 09/01/2020    Non-erosive gastritis with hemorrhage    ENDOSCOPY N/A 02/10/2021    Esophagitis    ENDOSCOPY N/A 04/11/2024    There were esophageal mucosal changes suspicious for short-segment Low's esophagus present in the distal esophagus. The maximum longitudinal extent of these mucosal changes was 2 cm in length. Mucosa was biopsied with a cold forceps for histologyDistal  "esophagus, biopsies: Mild chronic active esophagogastritis. No evidence of intestinal metaplasia, dysplasia. Antrum, bx, Mild chronic gastritis    FOOT SURGERY Left     INCISION AND DRAINAGE OF WOUND Left 09/2007    spider bite        Allergies   Allergen Reactions    Cefepime Hives and Anaphylaxis    Bactrim [Sulfamethoxazole-Trimethoprim] Other (See Comments)     \"RENAL FAILURE\"    Vancomycin Itching    Zolpidem Mental Status Change     \"makes him crazy\"    Metronidazole Rash       Medications Prior to Admission   Medication Sig Dispense Refill Last Dose    ascorbic acid (VITAMIN C) 1000 MG tablet Take 1 tablet by mouth Daily. 30 tablet 3 Past Week    bumetanide (BUMEX) 2 MG tablet Take 1 tablet by mouth Daily. 90 tablet 0 Past Week    busPIRone (BUSPAR) 10 MG tablet Take 1 tablet by mouth 2 (Two) Times a Day.   Past Week    calcitriol (ROCALTROL) 0.5 MCG capsule Take 1 capsule by mouth Daily. 90 capsule 4 Past Week    carvedilol (COREG) 3.125 MG tablet Take 1 tablet twice a day by oral route. 60 tablet 4 Past Week    Diclofenac Sodium (VOLTAREN) 1 % gel gel Apply 2 g topically to the appropriate area as directed 4 (Four) Times a Day As Needed. 300 g 11 Past Month    donepezil (ARICEPT) 10 MG tablet Take 1 tablet by mouth Daily. 90 tablet 1 Past Week    DULoxetine (CYMBALTA) 60 MG capsule Take 1 capsule by mouth Daily. 90 capsule 4 Past Week    famotidine (PEPCID) 20 MG tablet Take 1 tablet twice a day by oral route. 180 tablet 4 Past Week    fluticasone (FLONASE) 50 MCG/ACT nasal spray Instill 1 spray in each nostril as directed by provider Daily. 16 g 1 Past Week    Insulin Glargine (Lantus SoloStar) 100 UNIT/ML injection pen Inject 20 Units under the skin into the appropriate area as directed every night at bedtime. 15 mL 3 Past Week    Insulin Regular Human, Conc, (HumuLIN R) 500 UNIT/ML solution pen-injector CONCENTRATED injection Inject 15 Units under the skin into the appropriate area as directed 3 (Three) " Times a Day Before Meals.   Past Week    Insulin Regular Human, Conc, (HumuLIN R) 500 UNIT/ML solution pen-injector CONCENTRATED injection Inject 40 Units under the skin into the appropriate area as directed 3 (Three) Times a Day Before Meals. Inject 40 units under the skin in the the appropriate area with regular meals AND 40 units with large meals.   Past Week    Iron-Vitamin C (Vitron-C)  MG tablet Take 1 tablet twice a day by oral route. 60 tablet 2 Past Week    lactulose (Enulose) 10 GM/15ML solution solution (encephalopathy) Take 30 mL by mouth 3 times a day for 30 days. 2838 mL 11 Past Week    levocetirizine (XYZAL) 5 MG tablet Take 1 tablet by mouth every day at bedtime. 30 tablet 2 Past Week    levoFLOXacin (LEVAQUIN) 750 MG tablet Take 1 tablet by mouth Daily. 7 tablet 0 Past Week    melatonin 3 MG tablet Take 2 tablets by mouth At Night As Needed for Sleep. 30 tablet 0 Past Week    midodrine (PROAMATINE) 10 MG tablet Take 1 tablet by mouth 3 (Three) Times a Day. 270 tablet 4 Past Week    oxyCODONE (ROXICODONE) 10 MG tablet Take 1 tablet by mouth 2 (Two) Times a Day As Needed---MUST last 30 days 55 tablet 0 Past Week    pantoprazole (PROTONIX) 40 MG EC tablet Take 1 tablet by mouth Every 12 (Twelve) Hours. 180 tablet 4 Past Week    polyethylene glycol (MIRALAX) 17 GM/SCOOP powder Take 17 g by mouth Daily As Needed (constipation).   Past Week    pregabalin (LYRICA) 100 MG capsule Take 2 capsules by mouth Every Night.   Past Week    pregabalin (LYRICA) 50 MG capsule Take 1 capsule by mouth Daily.   Past Week    rosuvastatin (CRESTOR) 10 MG tablet Take 1 tablet by mouth Every Night. 30 tablet 2 Past Week    sennosides-docusate (PERICOLACE) 8.6-50 MG per tablet Take 1 tablet by mouth Every Night. Obtain OTC   Past Week    sodium hypochlorite (DAKIN'S 1/4 STRENGTH) 0.125 % solution topical solution 0.125% Apply 1 application topically to the appropriate area as directed 2 (Two) Times a Day. 473 mL 5 Past  "Week    sucralfate (CARAFATE) 1 g tablet Take 1 tablet by mouth 4 (Four) Times a Day before meals. 360 tablet 1 Past Week    tamsulosin (FLOMAX) 0.4 MG capsule 24 hr capsule Take 1 capsule by mouth Daily. 90 capsule 1 Past Week    valsartan (DIOVAN) 40 MG tablet Take 1 tablet by mouth Every Night. 30 tablet 2 Past Week    zinc sulfate (ZINCATE) 50 MG capsule Take 1 capsule by mouth Daily.   Past Week    nitroglycerin (NITROSTAT) 0.4 MG SL tablet Place 1 tablet under the tongue Every 5 (Five) Minutes As Needed for Chest Pain. Take no more than 3 doses in 15 minutes.   Unknown       Hospital Medications (active)         Dose Frequency Start End    acetaminophen (TYLENOL) 160 MG/5ML oral solution 650 mg 650 mg Every 4 Hours PRN 6/28/2024 --    Admin Instructions: If given for fever, use fever parameter: fever greater than 100.4 °F  Based on patient request - if ordered for moderate or severe pain, provider allows for administration of a medication prescribed for a lower pain scale.    Do not exceed 4 grams of acetaminophen in a 24 hr period. Max dose of 2gm for AST/ALT greater than 120 units/L.    If given for pain, use the following pain scale:   Mild Pain = Pain Score of 1-3, CPOT 1-2  Moderate Pain = Pain Score of 4-6, CPOT 3-4  Severe Pain = Pain Score of 7-10, CPOT 5-8    Route: Oral    Linked Group 1: Placed in \"Or\" Linked Group        acetaminophen (TYLENOL) suppository 650 mg 650 mg Every 4 Hours PRN 6/28/2024 --    Admin Instructions: If given for fever, use fever parameter: fever greater than 100.4 °F  Based on patient request - if ordered for moderate or severe pain, provider allows for administration of a medication prescribed for a lower pain scale.    Do not exceed 4 grams of acetaminophen in a 24 hr period. Max dose of 2gm for AST/ALT greater than 120 units/L.    If given for pain, use the following pain scale:   Mild Pain = Pain Score of 1-3, CPOT 1-2  Moderate Pain = Pain Score of 4-6, CPOT 3-4  Severe " "Pain = Pain Score of 7-10, CPOT 5-8    Route: Rectal    Linked Group 1: Placed in \"Or\" Linked Group        acetaminophen (TYLENOL) tablet 650 mg 650 mg Every 4 Hours PRN 6/28/2024 --    Admin Instructions: If given for fever, use fever parameter: fever greater than 100.4 °F  Based on patient request - if ordered for moderate or severe pain, provider allows for administration of a medication prescribed for a lower pain scale.    Do not exceed 4 grams of acetaminophen in a 24 hr period. Max dose of 2gm for AST/ALT greater than 120 units/L.    If given for pain, use the following pain scale:   Mild Pain = Pain Score of 1-3, CPOT 1-2  Moderate Pain = Pain Score of 4-6, CPOT 3-4  Severe Pain = Pain Score of 7-10, CPOT 5-8    Route: Oral    Linked Group 1: Placed in \"Or\" Linked Group        bisacodyl (DULCOLAX) EC tablet 5 mg 5 mg Daily PRN 6/28/2024 --    Admin Instructions: Use if no bowel movement after 12 hours.  Swallow whole. Do not crush, split, or chew tablet.    Route: Oral    Linked Group 2: Placed in \"And\" Linked Group        bisacodyl (DULCOLAX) suppository 10 mg 10 mg Daily PRN 6/28/2024 --    Admin Instructions: Use if no bowel movement after 12 hours.  Hold for diarrhea    Route: Rectal    Linked Group 2: Placed in \"And\" Linked Group        bumetanide (BUMEX) injection 1 mg 1 mg 2 Times Daily (Diuretics) 6/29/2024 --    Admin Instructions: Hold for SBP less than 100, DBP less than 60.  Give slow IV push over 1-2 minutes.    Route: Intravenous    calcitriol (ROCALTROL) capsule 0.5 mcg 0.5 mcg Daily 6/29/2024 --    Route: Oral    Calcium Replacement - Follow Nurse / BPA Driven Protocol  As Needed 6/28/2024 --    Admin Instructions: Open Order & Select \"[facility] Electrolyte Replacement Protocol Algorithm\" to View Details    Route: Does not apply    carvedilol (COREG) tablet 3.125 mg 3.125 mg 2 Times Daily With Meals 6/29/2024 --    Admin Instructions: Hold for SBP less than 100, DBP less than 60, or heart rate less " than 50. If a dose is held, please contact the provider.  Give with food.    Route: Oral    cetirizine (zyrTEC) tablet 10 mg 10 mg Daily 6/29/2024 --    Route: Oral    dextrose (D50W) (25 g/50 mL) IV injection 25 g 25 g Every 15 Minutes PRN 6/28/2024 --    Admin Instructions: Blood sugar less than 70; patient has IV access - Unresponsive, NPO or Unable To Safely Swallow    Route: Intravenous    dextrose (GLUTOSE) oral gel 15 g 15 g Every 15 Minutes PRN 6/28/2024 --    Admin Instructions: BS<70, Patient Alert, Is not NPO, Can safely swallow.    Route: Oral    DULoxetine (CYMBALTA) DR capsule 60 mg 60 mg Daily 6/29/2024 --    Admin Instructions: Do not crush or chew the capsules or tablets. The drug may not work as designed if the capsule or tablet is crushed or chewed. Swallow whole.  Caution: Look alike/sound alike drug alert. Capsule may be opened and sprinkled on applesauce or apple juice. Do not crush or chew capsule.    Route: Oral    ertapenem (INVanz) 1,000 mg in sodium chloride 0.9 % 100 mL MBP 1,000 mg Every 24 Hours 6/29/2024 7/4/2024    Admin Instructions: Caution: Look alike/sound alike drug alert.    Route: Intravenous    fluticasone (FLONASE) 50 MCG/ACT nasal spray 1 spray 1 spray Daily 6/29/2024 --    Route: Nasal    glucagon (GLUCAGEN) injection 1 mg 1 mg Every 15 Minutes PRN 6/28/2024 --    Admin Instructions: Blood Glucose Less Than 70 - Patient Without IV Access - Unresponsive, NPO or Unable To Safely Swallow  Reconstitute powder for injection by adding 1 mL of -supplied sterile diluent or sterile water for injection to a vial containing 1 mg of the drug, to provide solutions containing 1 mg/mL. Shake vial gently to dissolve.    Route: Intramuscular    HYDROcodone-acetaminophen (NORCO)  MG per tablet 1 tablet 1 tablet Every 6 Hours PRN 6/29/2024 7/4/2024    Admin Instructions: Based on patient request - if ordered for moderate or severe pain, provider allows for administration of  "a medication prescribed for a lower pain scale.  [JOSE]    Do not exceed 4 grams of acetaminophen in a 24 hr period. Max dose of 2gm for AST/ALT greater than 120 units/L        If given for pain, use the following pain scale:   Mild Pain = Pain Score of 1-3, CPOT 1-2  Moderate Pain = Pain Score of 4-6, CPOT 3-4  Severe Pain = Pain Score of 7-10, CPOT 5-8    Route: Oral    insulin glargine (LANTUS, SEMGLEE) injection 25 Units 25 Units Nightly 6/30/2024 --    Admin Instructions:     Route: Subcutaneous    Insulin Lispro (humaLOG) injection 2-7 Units 2-7 Units 4 Times Daily Before Meals & Nightly 6/29/2024 --    Admin Instructions: Correction Insulin - Low Dose - Total Insulin Dose Less Than 40 units/day (Lean, Elderly or Renal Patients)    Blood Glucose 150-199 mg/dL - 2 units  Blood Glucose 200-249 mg/dL - 3 units  Blood Glucose 250-299 mg/dL - 4 units  Blood Glucose 300-349 mg/dL - 5 units  Blood Glucose 350-400 mg/dL - 6 units  Blood Glucose Greater Than 400 mg/dL - 7 units & Call Provider   Caution: Look alike/sound alike drug alert    Route: Subcutaneous    isosorbide mononitrate (IMDUR) 24 hr tablet 30 mg 30 mg Daily 6/29/2024 --    Admin Instructions: Do not crush or chew the capsules or tablets. The drug may not work as designed if the capsule or tablet is crushed or chewed. Swallow whole.    Route: Oral    Magnesium Standard Dose Replacement - Follow Nurse / BPA Driven Protocol  As Needed 6/28/2024 --    Admin Instructions: Open Order & Select \"[facility] Electrolyte Replacement Protocol Algorithm\" to View Details    Route: Does not apply    nitroglycerin (NITROSTAT) SL tablet 0.4 mg 0.4 mg Every 5 Minutes PRN 6/28/2024 --    Admin Instructions: If Pain Unrelieved After 3 Doses Notify MD  May administer up to 3 doses per episode.    Route: Sublingual    ondansetron (ZOFRAN) injection 4 mg 4 mg Every 6 Hours PRN 6/28/2024 --    Admin Instructions: If BOTH ondansetron (ZOFRAN) and promethazine (PHENERGAN) are ordered " "use ondansetron first and THEN promethazine IF ondansetron is ineffective.    Route: Intravenous    oxyCODONE (ROXICODONE) immediate release tablet 10 mg 10 mg 2 Times Daily PRN 6/29/2024 7/4/2024    Admin Instructions: Based on patient request - if ordered for moderate or severe pain, provider allows for administration of a medication prescribed for a lower pain scale.      If given for pain, use the following pain scale:  Mild Pain = Pain Score of 1-3, CPOT 1-2  Moderate Pain = Pain Score of 4-6, CPOT 3-4  Severe Pain = Pain Score of 7-10, CPOT 5-8    Route: Oral    pantoprazole (PROTONIX) EC tablet 40 mg 40 mg Every Morning Before Breakfast 6/29/2024 --    Admin Instructions: Do not crush or chew the capsules or tablets. The drug may not work as designed if the capsule or tablet is crushed or chewed. Swallow whole.  Swallow whole; do not crush, split, or chew.    Route: Oral    Phosphorus Replacement - Follow Nurse / BPA Driven Protocol  As Needed 6/28/2024 --    Admin Instructions: Open Order & Select \"[facility] Electrolyte Replacement Protocol Algorithm\" to View Details    Route: Does not apply    polyethylene glycol (GoLYTELY) solution 4,000 mL 4,000 mL Once 6/30/2024 --    Admin Instructions: MIX entire container.  Give 2000 mL (1/2 bottle) for first dose at bedtime.  Store the remaining solution for next dose in refrigerator (may be refigerated for 48 hours).  Give 2nd dose from same bottle at 0600 or several hours prior to procedure.  Adjust administration times as indicated.    Route: Oral    polyethylene glycol (MIRALAX) packet 17 g 17 g Daily PRN 6/28/2024 --    Admin Instructions: Use if no bowel movement after 12 hours. Mix in 6-8 ounces of water.  Use 4-8 ounces of water, tea, or juice for each 17 gram dose.    Route: Oral    Linked Group 2: Placed in \"And\" Linked Group        Potassium Replacement - Follow Nurse / BPA Driven Protocol  As Needed 6/28/2024 --    Admin Instructions: Open Order & Select \"[facility] " "Electrolyte Replacement Protocol Algorithm\" to View Details    Route: Does not apply    pregabalin (LYRICA) capsule 100 mg 100 mg Nightly 6/29/2024 --    Admin Instructions:     Route: Oral    pregabalin (LYRICA) capsule 50 mg 50 mg Daily 6/29/2024 --    Admin Instructions:     Route: Oral    rosuvastatin (CRESTOR) tablet 10 mg 10 mg Daily 6/29/2024 --    Admin Instructions: Avoid grapefruit juice.    Route: Oral    sennosides-docusate (PERICOLACE) 8.6-50 MG per tablet 2 tablet 2 tablet 2 Times Daily PRN 6/28/2024 --    Admin Instructions: Start bowel management regimen if patient has not had a bowel movement after 12 hours.    Route: Oral    Linked Group 2: Placed in \"And\" Linked Group        sodium chloride 0.9 % flush 10 mL 10 mL Every 12 Hours Scheduled 6/28/2024 --    Route: Intravenous    sodium chloride 0.9 % flush 10 mL 10 mL As Needed 6/28/2024 --    Route: Intravenous    sodium chloride 0.9 % infusion 40 mL 40 mL As Needed 6/28/2024 --    Admin Instructions: Following administration of an IV intermittent medication, flush line with 40mL NS at 100mL/hr.    Route: Intravenous    tamsulosin (FLOMAX) 24 hr capsule 0.4 mg 0.4 mg Daily 6/29/2024 --    Admin Instructions: Do not crush or chew the capsules or tablets. The drug may not work as designed if the capsule or tablet is crushed or chewed. Swallow whole. If patient unable to swallow whole, contact pharmacy for alternative.    Route: Oral    timolol (TIMOPTIC) 0.5 % ophthalmic solution 1 drop 1 drop 2 Times Daily 6/29/2024 --    Route: Both Eyes    valsartan (DIOVAN) tablet 40 mg ([Held by provider] since 6/29/2024 11:25 AM) 40 mg Daily 6/29/2024 --    Admin Instructions: Hold for SBP less than 100, DBP less than 60, or heart rate less than 50. If a dose is held, please contact the provider.    Route: Oral    valsartan (DIOVAN) tablet 40 mg ([Held by provider] since 6/29/2024 11:25 AM) 40 mg Nightly 6/29/2024 --    Admin Instructions: Hold for SBP less than " 100, DBP less than 60, or heart rate less than 50. If a dose is held, please contact the provider.    Route: Oral            Social History     Tobacco Use    Smoking status: Former     Current packs/day: 0.00     Types: Cigarettes     Quit date:      Years since quittin.5    Smokeless tobacco: Never    Tobacco comments:     smoked in highschool   Substance Use Topics    Alcohol use: No        Past Family History:  Family History   Problem Relation Age of Onset    Colon cancer Father     Heart disease Father     Colon cancer Sister     Colon polyps Sister     Alzheimer's disease Mother     Coronary artery disease Sister     Coronary artery disease Sister        Review of Systems:  12 point inspection performed with pertinent positives in HPI.    Physical Exam:  Temp:  [97.4 °F (36.3 °C)-97.7 °F (36.5 °C)] 97.5 °F (36.4 °C)  Heart Rate:  [63-78] 66  Resp:  [16-18] 16  BP: (104-130)/(55-76) 111/61  Body mass index is 38.49 kg/m².    Intake/Output Summary (Last 24 hours) at 2024 1251  Last data filed at 2024 0941  Gross per 24 hour   Intake 420 ml   Output 3500 ml   Net -3080 ml     I/O this shift:  In: 100 [P.O.:100]  Out: 750 [Urine:750]    General appearance:   HEENT: Nonicteric sclerae.  EOMI.   Lungs: Good expansion bilaterally  Heart: Regular rate and rhythm.   Abdomen: Soft, nondistended, mild generalized tenderness, no R/G.  No palpable masses.  Extremities: No cyanosis, edema or pulse deficits.  Skin: No rash or jaundice.    Results Review:  Lab Results (last 24 hours)       Procedure Component Value Units Date/Time    Vitamin D,25-Hydroxy [[id number]]  (Normal) Collected: 24 0638    Specimen: Blood Updated: 24 1211     25 Hydroxy, Vitamin D 32.8 ng/ml     Narrative:      Reference Range for Total Vitamin D 25(OH)     Deficiency <20.0 ng/mL   Insufficiency 21-29 ng/mL   Sufficiency  ng/mL  Toxicity >100 ng/ml      POC Glucose Once [[id number]]  (Abnormal) Collected: 24  1133    Specimen: Blood Updated: 06/30/24 1203     Glucose 281 mg/dL      Comment: : [id number] Sanford JacksonMeter ID: [id number]       Creatinine Urine Random (kidney function) GFR component - Urine, Clean Catch [[id number]] Collected: 06/29/24 1845    Specimen: Urine, Clean Catch Updated: 06/30/24 1149     Creatinine, Urine 17.0 mg/dL     Narrative:      Reference intervals for random urine have not been established.  Clinical usage is dependent upon physician's interpretation in combination with other laboratory tests.       Urea Nitrogen, Urine - Urine, Clean Catch [[id number]] Collected: 06/29/24 1845    Specimen: Urine, Clean Catch Updated: 06/30/24 1149     Urea Nitrogen, Urine 169 mg/dL     Narrative:      Reference intervals for random urine have not been established.  Clinical usage is dependent upon physician's interpretation in combination with other laboratory tests.       POC Glucose Once [[id number]]  (Abnormal) Collected: 06/30/24 0751    Specimen: Blood Updated: 06/30/24 0805     Glucose 254 mg/dL      Comment: : [id number] Sanford JacksonMeter ID: [id number]       PTH, Intact [[id number]]  (Normal) Collected: 06/30/24 0638    Specimen: Blood Updated: 06/30/24 0722     PTH, Intact 41.7 pg/mL     Narrative:      Results may be falsely decreased if patient taking Biotin.      Basic Metabolic Panel [[id number]]  (Abnormal) Collected: 06/30/24 0638    Specimen: Blood Updated: 06/30/24 0710     Glucose 230 mg/dL      BUN 35 mg/dL      Creatinine 1.86 mg/dL      Sodium 140 mmol/L      Potassium 3.9 mmol/L      Chloride 103 mmol/L      CO2 26.0 mmol/L      Calcium 9.1 mg/dL      BUN/Creatinine Ratio 18.8     Anion Gap 11.0 mmol/L      eGFR 38.9 mL/min/1.73     Narrative:      GFR Normal >60  Chronic Kidney Disease <60  Kidney Failure <15      Phosphorus [[id number]]  (Abnormal) Collected: 06/30/24 0638    Specimen: Blood Updated: 06/30/24 0710     Phosphorus 4.6 mg/dL     Magnesium [[id number]]  (Normal)  Collected: 06/30/24 0638    Specimen: Blood Updated: 06/30/24 0710     Magnesium 2.0 mg/dL     CBC & Differential [[id number]]  (Abnormal) Collected: 06/30/24 0638    Specimen: Blood Updated: 06/30/24 0649    Narrative:      The following orders were created for panel order CBC & Differential.  Procedure                               Abnormality         Status                     ---------                               -----------         ------                     CBC Auto Differential[[id number]]        Abnormal            Final result                 Please view results for these tests on the individual orders.    CBC Auto Differential [[id number]]  (Abnormal) Collected: 06/30/24 0638    Specimen: Blood Updated: 06/30/24 0649     WBC 6.79 10*3/mm3      RBC 3.92 10*6/mm3      Hemoglobin 10.6 g/dL      Hematocrit 34.1 %      MCV 87.0 fL      MCH 27.0 pg      MCHC 31.1 g/dL      RDW 14.7 %      RDW-SD 47.4 fl      MPV 11.7 fL      Platelets 238 10*3/mm3      Neutrophil % 58.1 %      Lymphocyte % 23.0 %      Monocyte % 11.3 %      Eosinophil % 6.5 %      Basophil % 0.7 %      Immature Grans % 0.4 %      Neutrophils, Absolute 3.94 10*3/mm3      Lymphocytes, Absolute 1.56 10*3/mm3      Monocytes, Absolute 0.77 10*3/mm3      Eosinophils, Absolute 0.44 10*3/mm3      Basophils, Absolute 0.05 10*3/mm3      Immature Grans, Absolute 0.03 10*3/mm3      nRBC 0.0 /100 WBC     POC Glucose Once [[id number]]  (Abnormal) Collected: 06/29/24 2107    Specimen: Blood Updated: 06/29/24 2118     Glucose 250 mg/dL      Comment: : [id number] Mao WhitneyMeter ID: [id number]       Sodium, Urine, Random - Urine, Clean Catch [[id number]] Collected: 06/29/24 1845    Specimen: Urine, Clean Catch Updated: 06/29/24 1927     Sodium, Urine 120 mmol/L     Narrative:      Reference intervals for random urine have not been established.  Clinical usage is dependent upon physician's interpretation in combination with other laboratory tests.        Protein, Urine, Random - Urine, Clean Catch [[id number]] Collected: 06/29/24 1845    Specimen: Urine, Clean Catch Updated: 06/29/24 1927     Total Protein, Urine 34.9 mg/dL     Narrative:      Reference intervals for random urine have not been established.  Clinical usage is dependent upon physician's interpretation in combination with other laboratory tests.       POC Glucose Once [[id number]]  (Abnormal) Collected: 06/29/24 1617    Specimen: Blood Updated: 06/29/24 1638     Glucose 283 mg/dL      Comment: : [id number] Wright ID: [id number]               Radiology Review:  Imaging Results (Last 72 Hours)       Procedure Component Value Units Date/Time    XR Chest 1 View [[id number]] Collected: 06/28/24 2115     Updated: 06/28/24 2119    Narrative:      EXAM: XR CHEST 1 VW- 6/28/2024 7:03 PM     HISTORY: [facility] Triage Protocol       COMPARISON: 5/3/2024.     TECHNIQUE: Single frontal radiograph of the chest was obtained.     FINDINGS:     Support Devices: Prior median sternotomy.     Cardiac and Mediastinal Silhouettes: Normal.     Lungs/Pleura: Mild interstitial prominence. Suspected small right  pleural effusion. No visible pneumothorax.     Osseous structures: No acute osseous finding.     Other: None.       Impression:         Suspected small left pleural effusion.     Mild interstitial prominence may suggest mild edema.           This report was signed and finalized on 6/28/2024 9:16 PM by Ronal Wisdom.               Impression/Plan:    CHF exacerbation    CHF (congestive heart failure), NYHA class I, acute on chronic, combined      1.  Obstipation/last colonoscopy noting rectosigmoid ulcers most likely stercoral.  Status post endoclips for high risk stigmata.  Hgb has been stable.  No significant rectal bleeding.  Plan colonoscopy for tomorrow to follow-up last colonoscopy with poor prep.  Patient has been cleared by cardiology and is currently drinking a prep.  Hopefully he will be prepped due to  his obstipation.    -Colonoscopy in a.m.      2.  Nausea/vomiting/old food/prior meals noted.  Last EGD without any significant pathology.  Poorly controlled DM with elevated Hgb A1c.    -Glucose control  -Follow-up with endocrinologist  -Consider gastric emptying scan  -Consider Reglan with bowel prep if he is unable to tolerate      3.  Pleural effusions/CHF managed by cardiology with diuretics    -Continue supportive care.  Patient cleared for endoscopic studies.    Talon Rust MD  06/30/24   12:51 CDT    DISCLAIMER:    This physician works through a [employer] as an inpatient consultant gastroenterologist only and has no outpatient clinic for patient follow up.  Any results not available at time of inpatient discharge and/or GI clinic follow up should be managed by the hospitalist team, PCP, or outpatient gastroenterologist.        Electronically signed by Talon Rust MD at 06/30/24 1525       Latanya Paez MD at 06/28/24 1321              [facility] Medicine Services  HISTORY AND PHYSICAL    Date of Admission: 6/28/2024  Primary Care Physician: Del Shetty MD    Subjective   Primary Historian: patient     Chief Complaint: chest pain, shortness of breath     Shortness of Breath    Constipation    Illness      69 years old male with significant past medical history of morbid obesity questionable sleep apnea history of diabetes hypertension hyperlipidemia diastolic heart failure who is extremely poor historian who presents with multiple complaints Two or three weeks ago, he fell and hurt his stomach and his stomach is sore. Sore since the fall. Bruising occurred and has improved. Stomach feels like someone stuck a knife in the stomach. In the middle. Been two and a half weeks since he had a BM; has passed no stool.      Vomiting: started a couple weeks ago. Twice a day. Vomit looks like mainly water. Keeping down food and water otherwise. When asked if  "there is blood in his vomit he says yes, after he fell. He has a colonoscopy Monday for GI bleeding. The blood in the vomit looks like more black than anything.      Weakness: started after he fell. All over. Won't discuss this further, moves on despite asking. He clarifies he was weak before the fall but it got a lot worse. When asked about focality he says whe nhe goes walking he gets out of breath. Legs feel weak. Arms are weak.    Trouble breathing: after he fell. Sudden. Gradually getting worse. Comes and goes. What makes it worse: get out moving around. What makes it better: laying down makes it worse. On some days, it's just better.     Chest pain: yes. Feels like sharp pain in the middle. Duration is \"a good while.\" Off and on. Frequency: had it all day today. Occurring right now. Rates it as hurts bad, he'd say an 8. When asked when this particular episode started he goes back to saying it's been a while.  In the ER the patient cardiac enzymes were marginal, BNP was skyhigh chest x-ray was showing bilateral pleural effusion was diagnosed with acute on chronic diastolic congestive heart failure started on Lasix, patient last A1c 11.2 indicating poor compliance also has been complaining of possible vomiting blood hemoglobin stable he is scheduled to have EGD colonoscopy we will go ahead and put him and started him on Protonix hold all anticoagulation continue Lasix I's and O's Daily weights will consult cardiology for chest pain Place him on SCDs for DVT prophylaxis identify reconcile continue home medications patient might need to be seen by GI      Review of Systems   Respiratory:  Positive for shortness of breath.    Gastrointestinal:  Positive for constipation.      Otherwise complete ROS reviewed and negative except as mentioned in the HPI.    Past Medical History:   Past Medical History:   Diagnosis Date    Arthritis     Autonomic disease     CAD (coronary artery disease) 02/06/2017    Cervical " radiculopathy 09/16/2021    Chronic constipation with acute exaccerbation 05/10/2021    Coronary artery disease     Degeneration of cervical intervertebral disc 08/11/2021    Diabetes mellitus     Diabetic foot ulcer 08/31/2020    Diabetic polyneuropathy associated with type 2 diabetes mellitus 01/18/2021    Elevated cholesterol     Gastroesophageal reflux disease 05/13/2019    Headache     HTN (hypertension), benign 05/03/2017    Hyperlipidemia     Hypertension     Mixed hyperlipidemia 02/07/2017    Multiple lung nodules 01/26/2020    5mm, 9 mm RLL identified 1/2020, not present 10/2019.    Myocardial infarction     Osteomyelitis 01/22/2020    Osteomyelitis of fifth toe of right foot 10/07/2019    Pancreatitis     Persistent insomnia 01/20/2020    Renal disorder     Sleep apnea 02/06/2017    Sleep apnea with use of continuous positive airway pressure (CPAP)     NON-COMPLIANT    Slow transit constipation 01/16/2019    Spinal stenosis in cervical region 09/16/2021    Vitamin D deficiency 03/02/2021     Past Surgical History:  Past Surgical History:   Procedure Laterality Date    ABDOMINAL SURGERY      AMPUTATION FOOT / TOE Left 10/2021    5th digit     ANTERIOR CERVICAL DISCECTOMY W/ FUSION N/A 08/05/2022    Procedure: CERVICAL DISCECTOMY ANTERIOR WITH FUSION C5-6 with possible plating of C5-7 with neuromonitoring and 1 c-arm;  Surgeon: Karel Soliz MD;  Location:  [facility] OR;  Service: Neurosurgery;  Laterality: N/A;    APPENDECTOMY      BACK SURGERY      CARDIAC CATHETERIZATION Left 02/08/2021    Procedure: Left Heart Cath w poss intervention left anatomical snuff box acess;  Surgeon: Omkar Charles DO;  Location:  [facility] CATH INVASIVE LOCATION;  Service: Cardiology;  Laterality: Left;    CARDIAC SURGERY      CATARACT EXTRACTION      CERVICAL SPINE SURGERY      COLONOSCOPY N/A 01/31/2017    Normal exam repeat in 5 years    COLONOSCOPY N/A 02/11/2019    Mild acute inflammation    COLONOSCOPY N/A  "04/07/2024    2 areas at 10 and 20 cm with friability ulceration 2 clips placed at 20 cm and 4 clips at 10 cm poor prep normal mucosa,mild eroisions and ulcerations in visible vessels    COLONOSCOPY W/ POLYPECTOMY  03/04/2014    Hyperplastic polyp    CORONARY ARTERY BYPASS GRAFT  10/2015    ENDOSCOPY  04/13/2011    Gastritis with hemorrhage    ENDOSCOPY N/A 05/05/2017    Normal exam    ENDOSCOPY N/A 02/11/2019    Gastritis    ENDOSCOPY N/A 09/01/2020    Non-erosive gastritis with hemorrhage    ENDOSCOPY N/A 02/10/2021    Esophagitis    ENDOSCOPY N/A 04/11/2024    There were esophageal mucosal changes suspicious for short-segment Low's esophagus present in the distal esophagus. The maximum longitudinal extent of these mucosal changes was 2 cm in length. Mucosa was biopsied with a cold forceps for histologyDistal esophagus, biopsies: Mild chronic active esophagogastritis. No evidence of intestinal metaplasia, dysplasia. Antrum, bx, Mild chronic gastritis    FOOT SURGERY Left     INCISION AND DRAINAGE OF WOUND Left 09/2007    spider bite     Social History:  reports that he quit smoking about 33 years ago. His smoking use included cigarettes. He has never used smokeless tobacco. He reports that he does not drink alcohol and does not use drugs.    Family History: family history includes Alzheimer's disease in his mother; Colon cancer in his father and sister; Colon polyps in his sister; Coronary artery disease in his sister and sister; Heart disease in his father.       Allergies:  Allergies   Allergen Reactions    Cefepime Hives and Anaphylaxis    Bactrim [Sulfamethoxazole-Trimethoprim] Other (See Comments)     \"RENAL FAILURE\"    Vancomycin Itching    Zolpidem Mental Status Change     \"makes him crazy\"    Metronidazole Rash       Medications:  Prior to Admission medications    Medication Sig Start Date End Date Taking? Authorizing Provider   ascorbic acid (VITAMIN C) 1000 MG tablet Take 1 tablet by mouth Daily. " 8/25/23      bumetanide (BUMEX) 2 MG tablet Take 1 tablet by mouth Daily.  Patient taking differently: Take 1 tablet by mouth 2 (Two) Times a Day. 4/12/24   Rene Maloney MD   calcitriol (ROCALTROL) 0.5 MCG capsule Take 1 capsule by mouth Daily. 2/23/23      carvedilol (COREG) 3.125 MG tablet Take 1 tablet twice a day by oral route. 3/26/24      Cholecalciferol (D-5000) 125 MCG (5000 UT) tablet Take 1 tablet by mouth Daily Before Lunch. 8/25/23      Diclofenac Sodium (VOLTAREN) 1 % gel gel Apply 2 g topically to the appropriate area as directed 4 (Four) Times a Day As Needed. 6/1/23      donepezil (ARICEPT) 10 MG tablet Take 1 tablet by mouth Daily. 5/3/24      DULoxetine (CYMBALTA) 60 MG capsule Take 1 capsule by mouth Daily. 3/11/24      famotidine (PEPCID) 20 MG tablet Take 1 tablet twice a day by oral route. 3/26/24      fluticasone (FLONASE) 50 MCG/ACT nasal spray Instill 1 spray in each nostril as directed by provider Daily. 4/18/24      Insulin Glargine (Lantus SoloStar) 100 UNIT/ML injection pen Inject 20 Units under the skin into the appropriate area as directed Every Evening.  Patient not taking: Reported on 5/3/2024 3/11/24      Insulin Glargine (Lantus SoloStar) 100 UNIT/ML injection pen Inject 20 Units under the skin into the appropriate area as directed Every Night. 4/18/24      Insulin Glargine (Lantus SoloStar) 100 UNIT/ML injection pen Inject 20 Units under the skin into the appropriate area as directed every night at bedtime. 5/9/24      Insulin Regular Human, Conc, (HumuLIN R U-500 KwikPen) 500 UNIT/ML solution pen-injector CONCENTRATED injection Inject 15 Units under the skin into the appropriate area as directed 3 (Three) Times a Day Before Meals AND 40 Units Daily. 4/11/24   Rene Maloney MD   Iron-Vitamin C (Vitron-C)  MG tablet Take 1 tablet twice a day by oral route. 4/18/24      isosorbide mononitrate (IMDUR) 30 MG 24 hr tablet Take 1 tablet by mouth Daily.    Provider,  MD Bossman   lactulose (Enulose) 10 GM/15ML solution solution (encephalopathy) Take 30 mL by mouth 2 (Two) Times a Day. 5/23/24      lactulose (Enulose) 10 GM/15ML solution solution (encephalopathy) Take 30 mL by mouth 3 times a day for 30 days. 6/19/24 7/19/24     levocetirizine (XYZAL) 5 MG tablet Take 1 tablet by mouth every day at bedtime. 5/9/24      levoFLOXacin (LEVAQUIN) 750 MG tablet Take 1 tablet by mouth Daily. 6/27/24   Faisal Pascual MD   melatonin 3 MG tablet Take 2 tablets by mouth At Night As Needed for Sleep. 4/11/24   Rene Maloney MD   metoclopramide (REGLAN) 5 MG tablet Take 1 tablet by mouth 3 times a day.  Patient not taking: Reported on 5/3/2024    ProviderBossman MD   midodrine (PROAMATINE) 10 MG tablet Take 1 tablet by mouth 3 (Three) Times a Day. 5/6/24      midodrine (PROAMATINE) 2.5 MG tablet Take 1 tablet by mouth 3 (Three) Times a Day Before Meals.    Provider, MD Bossman   ondansetron ODT (ZOFRAN-ODT) 4 MG disintegrating tablet Place 1 tablet on the tongue Every 8 (Eight) Hours As Needed for Nausea or Vomiting. 4/11/24   Rene Maloney MD   oxyCODONE (ROXICODONE) 10 MG tablet Take 1 tablet by mouth 2 (Two) Times a Day As Needed. 5/17/24      oxyCODONE (ROXICODONE) 10 MG tablet Take 1 tablet by mouth 2 (Two) Times a Day As Needed---MUST last 30 days 6/19/24      pantoprazole (Protonix) 40 MG EC tablet Take 1 tablet by mouth 2 (Two) Times a Day. 11/8/22      pantoprazole (PROTONIX) 40 MG EC tablet Take 1 tablet by mouth Every 12 (Twelve) Hours. 6/19/24      polyethylene glycol (GoLYTELY) 236 g solution As directed by SUSAN Velásquez 5/30/24   Cristopher Hannah MD   polyethylene glycol (MIRALAX) 17 g packet Take 17 g by mouth Daily. Obtain OTC 8/23/23   Lexie Houston APRN   polyethylene glycol (MIRALAX) 17 g packet Take 17 g by mouth 2 (Two) Times a Day for 3 days. 6/27/24 6/30/24  Faisal Pascual MD   polyethylene glycol-electrolytes (NULYTELY) 420 g solution  Take as directed. 6/7/24   Cristopher Hannah MD   pregabalin (LYRICA) 100 MG capsule Take 2 capsules by mouth Every Night.    Bossman Blount MD   pregabalin (LYRICA) 50 MG capsule Take 1 capsule by mouth Daily.    Bossman Blount MD   rosuvastatin (CRESTOR) 10 MG tablet Take 1 tablet by mouth Daily.    Bossman Blount MD   rosuvastatin (CRESTOR) 10 MG tablet Take 1 tablet by mouth Every Night. 5/9/24      sennosides-docusate (PERICOLACE) 8.6-50 MG per tablet Take 1 tablet by mouth Every Night. Obtain OTC 8/23/23   Lexie Houston APRN   sodium hypochlorite (DAKIN'S 1/4 STRENGTH) 0.125 % solution topical solution 0.125% Apply solution three times daily 4/15/24      sodium hypochlorite (DAKIN'S 1/4 STRENGTH) 0.125 % solution topical solution 0.125% Apply 1 application topically to the appropriate area as directed 2 (Two) Times a Day. 5/13/24      sucralfate (CARAFATE) 1 g tablet Take 1 tablet by mouth 3 times a day.    Bossman Blount MD   sucralfate (CARAFATE) 1 g tablet Take 1 tablet by mouth 4 (Four) Times a Day before meals. 5/3/24      tamsulosin (FLOMAX) 0.4 MG capsule 24 hr capsule Take 1 capsule by mouth Daily. 8/7/23      timolol (TIMOPTIC) 0.5 % ophthalmic solution Administer 1 drop to both eyes 2 (Two) Times a Day.    Bossman Blount MD   valsartan (DIOVAN) 40 MG tablet Take 1 tablet by mouth Daily.    Bossman Blount MD   valsartan (DIOVAN) 40 MG tablet Take 1 tablet by mouth Every Night. 5/9/24      zinc sulfate (ZINCATE) 50 MG capsule Take 1 capsule by mouth Daily.    Bossman Blount MD   spironolactone (ALDACTONE) 25 MG tablet Take 1 tablet by mouth Daily. 9/28/20 12/31/20  Del Shetty MD     I have utilized all available immediate resources to obtain, update, or review the patient's current medications (including all prescriptions, over-the-counter products, herbals, cannabis/cannabidiol products, and vitamin/mineral/dietary (nutritional)  "supplements).    Objective     Vital Signs: /83   Pulse 87   Temp 98.6 °F (37 °C) (Oral)   Resp 18   Ht 182.9 cm (72\")   Wt 127 kg (280 lb)   SpO2 99%   BMI 37.97 kg/m²   Physical Exam  Constitutional:       Appearance: Normal appearance. He is obese.   HENT:      Head: Normocephalic.      Nose: Nose normal.   Eyes:      Extraocular Movements: Extraocular movements intact.      Pupils: Pupils are equal, round, and reactive to light.   Cardiovascular:      Rate and Rhythm: Normal rate and regular rhythm.   Pulmonary:      Breath sounds: Wheezing, rhonchi and rales present.   Abdominal:      General: Abdomen is flat.      Palpations: Abdomen is soft.   Musculoskeletal:      Cervical back: Normal range of motion.      Right lower leg: Edema present.      Left lower leg: Edema present.   Skin:     General: Skin is warm.      Capillary Refill: Capillary refill takes less than 2 seconds.   Neurological:      General: No focal deficit present.      Mental Status: He is alert.              Results Reviewed:  Lab Results (last 24 hours)       Procedure Component Value Units Date/Time    Single High Sensitivity Troponin T [[id number]]  (Abnormal) Collected: 06/28/24 2218    Specimen: Blood Updated: 06/28/24 2246     HS Troponin T 47 ng/L     Narrative:      High Sensitive Troponin T Reference Range:  <14.0 ng/L- Negative Female for AMI  <22.0 ng/L- Negative Male for AMI  >=14 - Abnormal Female indicating possible myocardial injury.  >=22 - Abnormal Male indicating possible myocardial injury.   Clinicians would have to utilize clinical acumen, EKG, Troponin, and serial changes to determine if it is an Acute Myocardial Infarction or myocardial injury due to an underlying chronic condition.         Comprehensive Metabolic Panel [[id number]]  (Abnormal) Collected: 06/28/24 2009    Specimen: Blood Updated: 06/28/24 2050     Glucose 177 mg/dL      BUN 32 mg/dL      Creatinine 2.01 mg/dL      Sodium 140 mmol/L      " Potassium 4.2 mmol/L      Chloride 104 mmol/L      CO2 23.0 mmol/L      Calcium 9.1 mg/dL      Total Protein 6.6 g/dL      Albumin 3.8 g/dL      ALT (SGPT) 9 U/L      AST (SGOT) 14 U/L      Alkaline Phosphatase 108 U/L      Total Bilirubin 0.3 mg/dL      Globulin 2.8 gm/dL      A/G Ratio 1.4 g/dL      BUN/Creatinine Ratio 15.9     Anion Gap 13.0 mmol/L      eGFR 35.5 mL/min/1.73     Narrative:      GFR Normal >60  Chronic Kidney Disease <60  Kidney Failure <15      BNP [[id number]]  (Abnormal) Collected: 06/28/24 2009    Specimen: Blood Updated: 06/28/24 2048     proBNP 3,206.0 pg/mL     Narrative:      This assay is used as an aid in the diagnosis of individuals suspected of having heart failure. It can be used as an aid in the diagnosis of acute decompensated heart failure (ADHF) in patients presenting with signs and symptoms of ADHF to the emergency department (ED). In addition, NT-proBNP of <300 pg/mL indicates ADHF is not likely.    Age Range Result Interpretation  NT-proBNP Concentration (pg/mL:      <50             Positive            >450                   Gray                 300-450                    Negative             <300    50-75           Positive            >900                  Gray                300-900                  Negative            <300      >75             Positive            >1800                  Gray                300-1800                  Negative            <300    Single High Sensitivity Troponin T [[id number]]  (Abnormal) Collected: 06/28/24 2009    Specimen: Blood Updated: 06/28/24 2047     HS Troponin T 51 ng/L     Narrative:      High Sensitive Troponin T Reference Range:  <14.0 ng/L- Negative Female for AMI  <22.0 ng/L- Negative Male for AMI  >=14 - Abnormal Female indicating possible myocardial injury.  >=22 - Abnormal Male indicating possible myocardial injury.   Clinicians would have to utilize clinical acumen, EKG, Troponin, and serial changes to determine if it is an  Acute Myocardial Infarction or myocardial injury due to an underlying chronic condition.         [facility] Draw [[id number]] Collected: 06/28/24 2009    Specimen: Blood Updated: 06/28/24 2031    Narrative:      The following orders were created for panel order [facility] Draw.  Procedure                               Abnormality         Status                     ---------                               -----------         ------                     Green Top (Gel)[[id number]]                                  Final result               Lavender Top[[id number]]                                     Final result               Red Top[[id number]]                                          Final result               Gray Top[[id number]]                                         Final result               Light Blue Top[[id number]]                                   Final result                 Please view results for these tests on the individual orders.    Green Top (Gel) [[id number]] Collected: 06/28/24 2009    Specimen: Blood Updated: 06/28/24 2031     Extra Tube Hold for add-ons.     Comment: Auto resulted.       Lavender Top [[id number]] Collected: 06/28/24 2009    Specimen: Blood Updated: 06/28/24 2031     Extra Tube hold for add-on     Comment: Auto resulted       Red Top [[id number]] Collected: 06/28/24 2009    Specimen: Blood Updated: 06/28/24 2031     Extra Tube Hold for add-ons.     Comment: Auto resulted.       Gray Top [[id number]] Collected: 06/28/24 2009    Specimen: Blood Updated: 06/28/24 2031     Extra Tube Hold for add-ons.     Comment: Auto resulted.       Light Blue Top [[id number]] Collected: 06/28/24 2009    Specimen: Blood Updated: 06/28/24 2031     Extra Tube Hold for add-ons.     Comment: Auto resulted       CBC & Differential [[id number]]  (Abnormal) Collected: 06/28/24 2009    Specimen: Blood Updated: 06/28/24 2030    Narrative:      The following orders were created for panel order CBC & Differential.  Procedure                                Abnormality         Status                     ---------                               -----------         ------                     CBC Auto Differential[[id number]]        Abnormal            Final result                 Please view results for these tests on the individual orders.    CBC Auto Differential [[id number]]  (Abnormal) Collected: 06/28/24 2009    Specimen: Blood Updated: 06/28/24 2030     WBC 10.18 10*3/mm3      RBC 3.87 10*6/mm3      Hemoglobin 10.5 g/dL      Hematocrit 33.5 %      MCV 86.6 fL      MCH 27.1 pg      MCHC 31.3 g/dL      RDW 14.6 %      RDW-SD 46.5 fl      MPV 12.0 fL      Platelets 252 10*3/mm3      Neutrophil % 71.6 %      Lymphocyte % 15.2 %      Monocyte % 8.6 %      Eosinophil % 3.7 %      Basophil % 0.5 %      Immature Grans % 0.4 %      Neutrophils, Absolute 7.28 10*3/mm3      Lymphocytes, Absolute 1.55 10*3/mm3      Monocytes, Absolute 0.88 10*3/mm3      Eosinophils, Absolute 0.38 10*3/mm3      Basophils, Absolute 0.05 10*3/mm3      Immature Grans, Absolute 0.04 10*3/mm3      nRBC 0.0 /100 WBC           Imaging Results (Last 24 Hours)       Procedure Component Value Units Date/Time    XR Chest 1 View [[id number]] Collected: 06/28/24 2115     Updated: 06/28/24 2119    Narrative:      EXAM: XR CHEST 1 VW- 6/28/2024 7:03 PM     HISTORY: [facility] Triage Protocol       COMPARISON: 5/3/2024.     TECHNIQUE: Single frontal radiograph of the chest was obtained.     FINDINGS:     Support Devices: Prior median sternotomy.     Cardiac and Mediastinal Silhouettes: Normal.     Lungs/Pleura: Mild interstitial prominence. Suspected small right  pleural effusion. No visible pneumothorax.     Osseous structures: No acute osseous finding.     Other: None.       Impression:         Suspected small left pleural effusion.     Mild interstitial prominence may suggest mild edema.           This report was signed and finalized on 6/28/2024 9:16 PM by Ronal Wisdom.             I have  personally reviewed and interpreted the radiology studies and ECG obtained at time of admission.     Assessment / Plan   Assessment:   Active Hospital Problems    Diagnosis     **CHF exacerbation     CHF (congestive heart failure), NYHA class I, acute on chronic, combined        Treatment Plan  The patient will be admitted to my service here at [facility].   In the ER the patient cardiac enzymes were marginal, BNP was skyhigh chest x-ray was showing bilateral pleural effusion was diagnosed with acute on chronic diastolic congestive heart failure started on Lasix, patient last A1c 11.2 indicating poor compliance also has been complaining of possible vomiting blood hemoglobin stable he is scheduled to have EGD colonoscopy we will go ahead and put him and started him on Protonix hold all anticoagulation continue Lasix I's and O's Daily weights will consult cardiology for chest pain Place him on SCDs for DVT prophylaxis identify reconcile continue home medications patient might need to be seen by GI  Medical Decision Making  Number and Complexity of problems: 3 acute   Differential Diagnosis: As above    Conditions and Status        Condition is at treatment goal.     MDM Data  External documents reviewed: no  Cardiac tracing (EKG, telemetry) interpretation: yes  Radiology interpretation: yes  Labs reviewed: yes  Any tests that were considered but not ordered: no     Decision rules/scores evaluated (example [id number], Wells, etc): no      Discussed with: ER     Care Planning  Shared decision making: Patient apprised of current labs, vitals, imaging and treatment plan.  They are agreeable with proceeding with plans as discussed.   Code status and discussions: full     Disposition  Social Determinants of Health that impact treatment or disposition: no  Estimated length of stay is tbd .     I confirmed that the patient's advanced care plan is present, code status is documented, and a surrogate decision maker  "is listed in the patient's medical record.       The patient was seen and examined by me on 2330 at [facility] .    Electronically signed by Latanya Paez MD, 06/28/24, 23:24 CDT.              Electronically signed by Latanya Paez MD at 06/29/24 0002          Emergency Department Notes        Steve Arevalo MD at 06/28/24 5371          Subjective   History of Present Illness  Symptoms started about a week ago--or two or three weeks.    Two or three weeks ago, he fell and hurt his stomach and his stomach is sore. Sore since the fall. Bruising occurred and has improved. Stomach feels like someone stuck a knife in the stomach. In the middle. Been two and a half weeks since he had a BM; has passed no stool.     Vomiting: started a couple weeks ago. Twice a day. Vomit looks like mainly water. Keeping down food and water otherwise. When asked if there is blood in his vomit he says yes, after he fell. He has a colonoscopy Monday for GI bleeding. The blood in the vomit looks like more black than anything.     Weakness: started after he fell. All over. Won't discuss this further, moves on despite asking. He clarifies he was weak before the fall but it got a lot worse. When asked about focality he says whe nhe goes walking he gets out of breath. Legs feel weak. Arms are weak.    Trouble breathing: after he fell. Sudden. Gradually getting worse. Comes and goes. What makes it worse: get out moving around. What makes it better: laying down makes it worse. On some days, it's just better.    Chest pain: yes. Feels like sharp pain in the middle. Duration is \"a good while.\" Off and on. Frequency: had it all day today. Occurring right now. Rates it as hurts bad, he'd say an 8. When asked when this particular episode started he goes back to saying it's been a while.     Fever: yes. No recorded febrile temps at home.      Review of Systems   Constitutional:  Positive for fever.   Respiratory:  Positive for shortness of " "breath.    Cardiovascular:  Positive for chest pain.   Gastrointestinal:  Positive for abdominal pain and vomiting.   Genitourinary:  Negative for difficulty urinating and dysuria.   Neurological:  Negative for syncope.       Past Medical History:   Diagnosis Date    Arthritis     Autonomic disease     CAD (coronary artery disease) 02/06/2017    Cervical radiculopathy 09/16/2021    Chronic constipation with acute exaccerbation 05/10/2021    Coronary artery disease     Degeneration of cervical intervertebral disc 08/11/2021    Diabetes mellitus     Diabetic foot ulcer 08/31/2020    Diabetic polyneuropathy associated with type 2 diabetes mellitus 01/18/2021    Elevated cholesterol     Gastroesophageal reflux disease 05/13/2019    Headache     HTN (hypertension), benign 05/03/2017    Hyperlipidemia     Hypertension     Mixed hyperlipidemia 02/07/2017    Multiple lung nodules 01/26/2020    5mm, 9 mm RLL identified 1/2020, not present 10/2019.    Myocardial infarction     Osteomyelitis 01/22/2020    Osteomyelitis of fifth toe of right foot 10/07/2019    Pancreatitis     Persistent insomnia 01/20/2020    Renal disorder     Sleep apnea 02/06/2017    Sleep apnea with use of continuous positive airway pressure (CPAP)     NON-COMPLIANT    Slow transit constipation 01/16/2019    Spinal stenosis in cervical region 09/16/2021    Vitamin D deficiency 03/02/2021       Allergies   Allergen Reactions    Cefepime Hives and Anaphylaxis    Bactrim [Sulfamethoxazole-Trimethoprim] Other (See Comments)     \"RENAL FAILURE\"    Vancomycin Itching    Zolpidem Mental Status Change     \"makes him crazy\"    Metronidazole Rash       Past Surgical History:   Procedure Laterality Date    ABDOMINAL SURGERY      AMPUTATION FOOT / TOE Left 10/2021    5th digit     ANTERIOR CERVICAL DISCECTOMY W/ FUSION N/A 08/05/2022    Procedure: CERVICAL DISCECTOMY ANTERIOR WITH FUSION C5-6 with possible plating of C5-7 with neuromonitoring and 1 c-arm;  Surgeon: " Karel Soliz MD;  Location:  [facility] OR;  Service: Neurosurgery;  Laterality: N/A;    APPENDECTOMY      BACK SURGERY      CARDIAC CATHETERIZATION Left 02/08/2021    Procedure: Left Heart Cath w poss intervention left anatomical snuff box acess;  Surgeon: Omkar Charles DO;  Location:  [facility] CATH INVASIVE LOCATION;  Service: Cardiology;  Laterality: Left;    CARDIAC SURGERY      CATARACT EXTRACTION      CERVICAL SPINE SURGERY      COLONOSCOPY N/A 01/31/2017    Normal exam repeat in 5 years    COLONOSCOPY N/A 02/11/2019    Mild acute inflammation    COLONOSCOPY N/A 04/07/2024    2 areas at 10 and 20 cm with friability ulceration 2 clips placed at 20 cm and 4 clips at 10 cm poor prep normal mucosa,mild eroisions and ulcerations in visible vessels    COLONOSCOPY W/ POLYPECTOMY  03/04/2014    Hyperplastic polyp    CORONARY ARTERY BYPASS GRAFT  10/2015    ENDOSCOPY  04/13/2011    Gastritis with hemorrhage    ENDOSCOPY N/A 05/05/2017    Normal exam    ENDOSCOPY N/A 02/11/2019    Gastritis    ENDOSCOPY N/A 09/01/2020    Non-erosive gastritis with hemorrhage    ENDOSCOPY N/A 02/10/2021    Esophagitis    ENDOSCOPY N/A 04/11/2024    There were esophageal mucosal changes suspicious for short-segment Low's esophagus present in the distal esophagus. The maximum longitudinal extent of these mucosal changes was 2 cm in length. Mucosa was biopsied with a cold forceps for histologyDistal esophagus, biopsies: Mild chronic active esophagogastritis. No evidence of intestinal metaplasia, dysplasia. Antrum, bx, Mild chronic gastritis    FOOT SURGERY Left     INCISION AND DRAINAGE OF WOUND Left 09/2007    spider bite       Family History   Problem Relation Age of Onset    Colon cancer Father     Heart disease Father     Colon cancer Sister     Colon polyps Sister     Alzheimer's disease Mother     Coronary artery disease Sister     Coronary artery disease Sister        Social History     Socioeconomic History    Marital  status:    Tobacco Use    Smoking status: Former     Current packs/day: 0.00     Types: Cigarettes     Quit date:      Years since quittin.5    Smokeless tobacco: Never    Tobacco comments:     smoked in highschool   Vaping Use    Vaping status: Never Used   Substance and Sexual Activity    Alcohol use: No    Drug use: No    Sexual activity: Defer           Objective   Physical Exam  Vitals reviewed.   Constitutional:       General: He is not in acute distress.  HENT:      Head: Normocephalic and atraumatic.   Eyes:      Extraocular Movements: Extraocular movements intact.      Conjunctiva/sclera: Conjunctivae normal.   Cardiovascular:      Pulses: Normal pulses.      Heart sounds: Normal heart sounds.   Pulmonary:      Effort: Pulmonary effort is normal. No respiratory distress.      Breath sounds: Normal breath sounds. No wheezing.   Abdominal:      General: Abdomen is flat. There is no distension.      Tenderness: There is abdominal tenderness (mild in mid-abdomen and epigastrium none in RUQ LUQ or bilateral lower quadrants). There is no guarding or rebound.   Musculoskeletal:      Cervical back: Normal range of motion and neck supple.      Right lower leg: Edema present.      Left lower leg: Edema present.   Skin:     General: Skin is warm and dry.   Neurological:      General: No focal deficit present.      Mental Status: He is alert. Mental status is at baseline.   Psychiatric:         Behavior: Behavior normal.         Thought Content: Thought content normal.         Procedures          ED Course  ED Course as of 24 0504      2249 Repeat tn downtrended by 4 [AS]      ED Course User Index  [AS] Steve Arevalo MD                                             Medical Decision Making  Problems Addressed:  Chest pain with high risk for cardiac etiology: complicated acute illness or injury    Amount and/or Complexity of Data Reviewed  Labs: ordered.  ECG/medicine  tests: ordered.    Risk  Prescription drug management.  Decision regarding hospitalization.      Erick Luong is a 69 y.o. male with PMH above who presents to the Emergency Department with multiple complaints.  He has a fairly nonlinear thought process; he is recently had abdominal pain workup with a normal CT and his labs here today are unremarkable, and his abdominal exam is overall reassuring without pain in any focal surgical area to suggest surgical emergency like bowel obstruction, malrotation or volvulus, cholecystitis, appendicitis, or diverticulitis.  His chest pain is atypical but he has a history of CAD with no recent provocative testing and his troponin turned out to be dynamic.  His EKG was without any focal ischemic abnormality.  Trial nitroglycerin for chest pain did not seem to help much.  He also has exertional dyspnea which is explained by pulmonary effusions, and elevated BNP, and edema.  Therefore he will be admitted for consideration of cardiac ischemic evaluation although his troponin downtrending does not suggest he needs an emergent cath at this time, as well as fluid overload management.      Final diagnosis: Fluid overload, chest pain with high risk of cardiac etiology.    All questions answered. Patient/family was understanding and in agreement with today's assessment and plan. The patient was monitored during their stay in the ED and dispositioned without acute event.    Electronically signed by:  Steve Arevalo MD 6/29/2024 05:04 CDT      Note: Dragon medical dictation software was used in the creation of this note.      Final diagnoses:   Chest pain with high risk for cardiac etiology       ED Disposition  ED Disposition       ED Disposition   Decision to Admit    Condition   --    Comment   Level of Care: Telemetry [5]   Diagnosis: CHF (congestive heart failure), NYHA class I, acute on chronic, combined [[id number]]   Admitting Physician: GURPREET NEUMANN [[id number]]   Certification: I  Certify That Inpatient Hospital Services Are Medically Necessary For Greater Than 2 Midnights                 No follow-up provider specified.       Medication List      No changes were made to your prescriptions during this visit.            Steve Arevalo MD  06/29/24 0504      Electronically signed by Steve Arevalo MD at 06/29/24 0504       Current Facility-Administered Medications   Medication Dose Route Frequency Provider Last Rate Last Admin    acetaminophen (TYLENOL) tablet 650 mg  650 mg Oral Q4H PRN Latanya Paez MD        Or    acetaminophen (TYLENOL) 160 MG/5ML oral solution 650 mg  650 mg Oral Q4H PRN Latanya Paez MD        Or    acetaminophen (TYLENOL) suppository 650 mg  650 mg Rectal Q4H PRN Latanya Paez MD        sennosides-docusate (PERICOLACE) 8.6-50 MG per tablet 2 tablet  2 tablet Oral BID PRN Latanya Paez MD        And    polyethylene glycol (MIRALAX) packet 17 g  17 g Oral Daily PRN Latanya Paez MD   17 g at 06/29/24 1305    And    bisacodyl (DULCOLAX) EC tablet 5 mg  5 mg Oral Daily PRN Latanya Paez MD   5 mg at 06/29/24 1304    And    bisacodyl (DULCOLAX) suppository 10 mg  10 mg Rectal Daily PRN Latanya Paez MD   10 mg at 06/29/24 1840    bumetanide (BUMEX) injection 1 mg  1 mg Intravenous BID Josué De Oliveira MD   1 mg at 07/01/24 0852    busPIRone (BUSPAR) tablet 10 mg  10 mg Oral BID Josué De Oliveira MD   10 mg at 07/01/24 1310    calcitriol (ROCALTROL) capsule 0.5 mcg  0.5 mcg Oral Daily Josué De Oliveira MD   0.5 mcg at 07/01/24 1258    Calcium Replacement - Follow Nurse / BPA Driven Protocol   Does not apply PRN Latanya Paez MD        carvedilol (COREG) tablet 3.125 mg  3.125 mg Oral BID With Meals Donna Roman APRN   3.125 mg at 06/30/24 1704    cetirizine (zyrTEC) tablet 10 mg  10 mg Oral Daily Sorzano, Josué F, MD   10 mg at 07/01/24 1259    dextrose (D50W) (25 g/50 mL) IV injection 25 g  25 g Intravenous Q15 Min PRN Jeannine,  MD Latanya        dextrose (GLUTOSE) oral gel 15 g  15 g Oral Q15 Min PRN Latanya Paez MD        DULoxetine (CYMBALTA) DR capsule 60 mg  60 mg Oral Daily Josué De Oliveira MD   60 mg at 07/01/24 1259    ertapenem (INVanz) 1,000 mg in sodium chloride 0.9 % 100 mL MBP  1,000 mg Intravenous Q24H Josué De Oliveira  mL/hr at 07/01/24 1310 1,000 mg at 07/01/24 1310    fluticasone (FLONASE) 50 MCG/ACT nasal spray 1 spray  1 spray Nasal Daily Josué De Oliveira MD   1 spray at 07/01/24 0958    glucagon (GLUCAGEN) injection 1 mg  1 mg Intramuscular Q15 Min PRN Latanya Paez MD        HYDROcodone-acetaminophen (NORCO)  MG per tablet 1 tablet  1 tablet Oral Q6H PRN Latanya Paez MD   1 tablet at 06/29/24 1840    insulin glargine (LANTUS, SEMGLEE) injection 25 Units  25 Units Subcutaneous Nightly Josué De Oliveira MD        Insulin Lispro (humaLOG) injection 2-7 Units  2-7 Units Subcutaneous 4x Daily AC & at Bedtime Latanya Paez MD   2 Units at 07/01/24 1317    isosorbide mononitrate (IMDUR) 24 hr tablet 30 mg  30 mg Oral Daily Josué De Oliveira MD   30 mg at 07/01/24 1300    Magnesium Standard Dose Replacement - Follow Nurse / BPA Driven Protocol   Does not apply PRN Latanya Paez MD        nitroglycerin (NITROSTAT) SL tablet 0.4 mg  0.4 mg Sublingual Q5 Min PRN Latanya Paez MD        ondansetron (ZOFRAN) injection 4 mg  4 mg Intravenous Q6H PRN Latanya Paez MD   4 mg at 06/29/24 1714    oxyCODONE (ROXICODONE) immediate release tablet 10 mg  10 mg Oral BID PRN Latanya Paez MD   10 mg at 06/30/24 2054    pantoprazole (PROTONIX) EC tablet 40 mg  40 mg Oral QAM AC Josué De Oliveira MD   40 mg at 06/30/24 0823    Phosphorus Replacement - Follow Nurse / BPA Driven Protocol   Does not apply PRN Latanya Paez MD        polyethylene glycol (GoLYTELY) solution 3,000 mL  3,000 mL Oral Once Justen Recinos APRN        Followed by    [START ON 7/2/2024] polyethylene glycol (GoLYTELY) solution  1,000 mL  1,000 mL Oral Once Justen Recinos APRN        Potassium Replacement - Follow Nurse / BPA Driven Protocol   Does not apply PRN Latanya Paez MD        pregabalin (LYRICA) capsule 100 mg  100 mg Oral Nightly Latanya Paez MD   100 mg at 06/30/24 2037    pregabalin (LYRICA) capsule 50 mg  50 mg Oral Daily Josué De Oliveira MD   50 mg at 07/01/24 1300    rosuvastatin (CRESTOR) tablet 10 mg  10 mg Oral Daily Jousé De Oliveira MD   10 mg at 07/01/24 1300    sodium chloride 0.9 % flush 10 mL  10 mL Intravenous Q12H Latanya Paez MD   10 mL at 07/01/24 1001    sodium chloride 0.9 % flush 10 mL  10 mL Intravenous PRN Latanya Paez MD        sodium chloride 0.9 % infusion 40 mL  40 mL Intravenous PRN Latanya Paez MD        sodium chloride 0.9 % infusion 500 mL  500 mL Intravenous Continuous PRN Lamine Villar CRNA 25 mL/hr at 07/01/24 1317 Restarted at 07/01/24 1317    tamsulosin (FLOMAX) 24 hr capsule 0.4 mg  0.4 mg Oral Daily Josué De Oliveira MD   0.4 mg at 07/01/24 1300    timolol (TIMOPTIC) 0.5 % ophthalmic solution 1 drop  1 drop Both Eyes BID Josué De Oliveira MD   1 drop at 07/01/24 0958    [Held by provider] valsartan (DIOVAN) tablet 40 mg  40 mg Oral Daily Josué De Oliveira MD        [Held by provider] valsartan (DIOVAN) tablet 40 mg  40 mg Oral Nightly Josué De Oliveira MD           ramana  Cardiology was consulted due to CHF exacerbation.  Patient was given a dose of IV Lasix in the emergency department typically is managed on bumetanide at home.  We will start the patient on IV diuretics and closely monitor renal function and urinary response.  He has some increased shortness of breath some evidence of volume overload on imaging and an elevated BNP.  He has significant lower extremity edema.     In regards to his anticoagulation/antiplatelet management would recommend that the patient be at least administered a low-dose aspirin while off of anticoagulation.  Once  anticoagulation can be resumed for CVA prophylaxis in the setting of persistent atrial fibrillation aspirin could be discontinued.     BMP in a.m.  Strict intake and output documentation  Daily weights  Low sodium diet  Resume home statin and beta blocker therapy.   Recommend resumption of aspirin.   Would recommend reconciliation of home medications ASAP     Thank you for the consultation, cardiology will gladly continue to follow.       6/29  Continue diuresis, patient currently on Bumex 1 mg IV every 12 hours.  Continue carvedilol 3.125 p.o. twice daily.  Continue Imdur.  Will place valsartan on hold for now.  Continue statin.       Ertapenem IV as mentioned above.  Follow urine culture     Follow renal function and electrolytes.     Continue Lantus 20 units subcutaneous at night.  Sliding scale of insulin.     Continue pregabalin as per prior use.     Lactulose for constipation.  MiraLAX as needed.     The patient is scheduled for a colonoscopy on July 1, 2024.  Will hold any anticoagulants for now.  Decision on long-term anticoagulation to be made with patient after colonoscopy.     Extremely poor historian.  Unknown home medications.  Medication reconciliation not possible with pharmacy.  I have started some medications that the patient possibly takes.    6/30 Continue diuresis, patient currently on Bumex 1 mg IV every 12 hours.  Continue carvedilol 3.125 p.o. twice daily.  Continue Imdur; valsartan on hold for now.  Continue statin.       Ertapenem IV started 6/20/2024, day #2.  Follow urine culture final report.     Follow renal function and electrolytes.     Continue Lantus, will increase to 25 units subcutaneous at bedtime.  Sliding scale of insulin.     Continue pregabalin as per prior use.     Patient has received several laxatives with no bowel movement, reports no bowel movements for 2 weeks.  Fleet enema today.  Again asked patient today about episodes of rectal bleeding and he states that his  anticoagulation was discontinued approximately 2 months ago due to bleeding but he has not had any more episodes of bleeding since then.     The patient is scheduled for a colonoscopy on July 1, 2024.  Will hold any anticoagulants for now.  Decision on long-term anticoagulation to be made with patient after colonoscopy.  Gastroenterology evaluation was ordered due to patient and family member's request.      7/1   Continue diuresis, patient currently on Bumex 1 mg IV every 12 hours.  Continue carvedilol 3.125 p.o. twice daily.  Continue Imdur; valsartan on hold for now.  Continue statin.       Ertapenem IV started 6/20/2024, day #3.      Follow renal function and electrolytes.  Replace potassium.     Continue Lantus 25 units subcutaneous at bedtime.  Sliding scale of insulin.     Continue pregabalin as per prior use.     No rectal bleeding.  Patient had bowel movements with bowel preparation started yesterday..     The patient had been scheduled for a colonoscopy on July 1, 2024.  Will hold any anticoagulants for now.  Decision on long-term anticoagulation to be made with patient after colonoscopy.  Gastroenterology evaluation took place.     The patient is stable from the cardiac standpoint and there are no absolute  contraindications for colonoscopy planned.  Discussed with cardiologist.

## 2024-07-01 NOTE — PLAN OF CARE
Goal Outcome Evaluation:           Progress: no change   Pt A/Ox4. VSS on RA. IV to RFA, IID. Standby assist. Wound to L heel and toe, clean,dry, and intact. Boot in place when walking PRN. Tele- running A-fib. Voiding via urinal. Scheduled for colonoscopy for tomorrow due to attempt of colonoscopy today but unable to be completed per MD, and pt agreed to another. Consent signed. NPO after midnight. Holding anticoagulants. No c/o of pain at this time. Up in chair. Safety maintained. Call Light in reach.

## 2024-07-01 NOTE — NURSING NOTE
[facility]  INPATIENT WOUND & OSTOMY CARE    Today's Date: 07/01/24    Patient Name: Erick Luong  MRN: [id number]  [facility]: [id number]  PCP: Del Shetty MD  Attending Provider: Josué De Oliveira MD  Length of Stay: 3    Cancelled wound care consult for lower leg wound as Dr. Cerrato is on the case and will be managing this wound. If any other issues arise please re-consult wound care.     This document has been electronically signed by Ada Choe RN on 7/1/2024 08:47 CDT

## 2024-07-02 ENCOUNTER — ANESTHESIA EVENT (OUTPATIENT)
Dept: GASTROENTEROLOGY | Facility: HOSPITAL | Age: 69
End: 2024-07-02
Payer: COMMERCIAL

## 2024-07-02 ENCOUNTER — ANESTHESIA (OUTPATIENT)
Dept: GASTROENTEROLOGY | Facility: HOSPITAL | Age: 69
End: 2024-07-02
Payer: COMMERCIAL

## 2024-07-02 LAB
ABSOLUTE LUNG FLUID CONTENT: 41 % (ref 20–35)
ANION GAP SERPL CALCULATED.3IONS-SCNC: 14 MMOL/L (ref 5–15)
BUN SERPL-MCNC: 25 MG/DL (ref 8–23)
BUN/CREAT SERPL: 15.9 (ref 7–25)
CALCIUM SPEC-SCNC: 8.6 MG/DL (ref 8.6–10.5)
CHLORIDE SERPL-SCNC: 101 MMOL/L (ref 98–107)
CO2 SERPL-SCNC: 28 MMOL/L (ref 22–29)
CREAT SERPL-MCNC: 1.57 MG/DL (ref 0.76–1.27)
EGFRCR SERPLBLD CKD-EPI 2021: 47.7 ML/MIN/1.73
GLUCOSE BLDC GLUCOMTR-MCNC: 113 MG/DL (ref 70–130)
GLUCOSE BLDC GLUCOMTR-MCNC: 116 MG/DL (ref 70–130)
GLUCOSE BLDC GLUCOMTR-MCNC: 136 MG/DL (ref 70–130)
GLUCOSE BLDC GLUCOMTR-MCNC: 89 MG/DL (ref 70–130)
GLUCOSE SERPL-MCNC: 91 MG/DL (ref 65–99)
POTASSIUM SERPL-SCNC: 3.2 MMOL/L (ref 3.5–5.2)
POTASSIUM SERPL-SCNC: 3.6 MMOL/L (ref 3.5–5.2)
SODIUM SERPL-SCNC: 143 MMOL/L (ref 136–145)

## 2024-07-02 PROCEDURE — G0378 HOSPITAL OBSERVATION PER HR: HCPCS

## 2024-07-02 PROCEDURE — 80048 BASIC METABOLIC PNL TOTAL CA: CPT | Performed by: INTERNAL MEDICINE

## 2024-07-02 PROCEDURE — 25010000002 BUMETANIDE PER 0.5 MG: Performed by: FAMILY MEDICINE

## 2024-07-02 PROCEDURE — 25010000002 PROPOFOL 10 MG/ML EMULSION: Performed by: NURSE ANESTHETIST, CERTIFIED REGISTERED

## 2024-07-02 PROCEDURE — 99212 OFFICE O/P EST SF 10 MIN: CPT | Performed by: NURSE PRACTITIONER

## 2024-07-02 PROCEDURE — 25810000003 SODIUM CHLORIDE 0.9 % SOLUTION: Performed by: ANESTHESIOLOGY

## 2024-07-02 PROCEDURE — 94726 PLETHYSMOGRAPHY LUNG VOLUMES: CPT

## 2024-07-02 PROCEDURE — 82948 REAGENT STRIP/BLOOD GLUCOSE: CPT

## 2024-07-02 PROCEDURE — 63710000001 INSULIN GLARGINE PER 5 UNITS: Performed by: FAMILY MEDICINE

## 2024-07-02 PROCEDURE — 25010000002 ERTAPENEM PER 500 MG: Performed by: FAMILY MEDICINE

## 2024-07-02 PROCEDURE — 84132 ASSAY OF SERUM POTASSIUM: CPT | Performed by: FAMILY MEDICINE

## 2024-07-02 PROCEDURE — 94726 PLETHYSMOGRAPHY LUNG VOLUMES: CPT | Performed by: HOSPITALIST

## 2024-07-02 RX ORDER — SODIUM CHLORIDE 9 MG/ML
40 INJECTION, SOLUTION INTRAVENOUS AS NEEDED
Status: DISCONTINUED | OUTPATIENT
Start: 2024-07-02 | End: 2024-07-02 | Stop reason: HOSPADM

## 2024-07-02 RX ORDER — LIDOCAINE HYDROCHLORIDE 20 MG/ML
INJECTION, SOLUTION EPIDURAL; INFILTRATION; INTRACAUDAL; PERINEURAL AS NEEDED
Status: DISCONTINUED | OUTPATIENT
Start: 2024-07-02 | End: 2024-07-02 | Stop reason: SURG

## 2024-07-02 RX ORDER — POTASSIUM CHLORIDE 750 MG/1
40 CAPSULE, EXTENDED RELEASE ORAL EVERY 4 HOURS
Status: ACTIVE | OUTPATIENT
Start: 2024-07-02 | End: 2024-07-02

## 2024-07-02 RX ORDER — BUMETANIDE 1 MG/1
1 TABLET ORAL
Status: DISCONTINUED | OUTPATIENT
Start: 2024-07-02 | End: 2024-07-03 | Stop reason: HOSPADM

## 2024-07-02 RX ORDER — SODIUM CHLORIDE 0.9 % (FLUSH) 0.9 %
10 SYRINGE (ML) INJECTION AS NEEDED
Status: DISCONTINUED | OUTPATIENT
Start: 2024-07-02 | End: 2024-07-02 | Stop reason: HOSPADM

## 2024-07-02 RX ORDER — SODIUM CHLORIDE 0.9 % (FLUSH) 0.9 %
10 SYRINGE (ML) INJECTION EVERY 12 HOURS SCHEDULED
Status: DISCONTINUED | OUTPATIENT
Start: 2024-07-02 | End: 2024-07-02 | Stop reason: HOSPADM

## 2024-07-02 RX ORDER — PROPOFOL 10 MG/ML
VIAL (ML) INTRAVENOUS AS NEEDED
Status: DISCONTINUED | OUTPATIENT
Start: 2024-07-02 | End: 2024-07-02 | Stop reason: SURG

## 2024-07-02 RX ORDER — SODIUM CHLORIDE 9 MG/ML
100 INJECTION, SOLUTION INTRAVENOUS CONTINUOUS
Status: DISCONTINUED | OUTPATIENT
Start: 2024-07-02 | End: 2024-07-02

## 2024-07-02 RX ORDER — POTASSIUM CHLORIDE 750 MG/1
40 CAPSULE, EXTENDED RELEASE ORAL ONCE
Status: DISCONTINUED | OUTPATIENT
Start: 2024-07-02 | End: 2024-07-02 | Stop reason: SDUPTHER

## 2024-07-02 RX ADMIN — BUSPIRONE HYDROCHLORIDE 10 MG: 10 TABLET ORAL at 20:40

## 2024-07-02 RX ADMIN — TIMOLOL MALEATE 1 DROP: 5 SOLUTION/ DROPS OPHTHALMIC at 20:41

## 2024-07-02 RX ADMIN — POLYETHYLENE GLYCOL 3350, SODIUM SULFATE ANHYDROUS, SODIUM BICARBONATE, SODIUM CHLORIDE, POTASSIUM CHLORIDE 1000 ML: 236; 22.74; 6.74; 5.86; 2.97 POWDER, FOR SOLUTION ORAL at 05:30

## 2024-07-02 RX ADMIN — POTASSIUM CHLORIDE 40 MEQ: 750 CAPSULE, EXTENDED RELEASE ORAL at 16:56

## 2024-07-02 RX ADMIN — ISOSORBIDE MONONITRATE 30 MG: 30 TABLET, EXTENDED RELEASE ORAL at 12:22

## 2024-07-02 RX ADMIN — ROSUVASTATIN CALCIUM 10 MG: 20 TABLET, FILM COATED ORAL at 12:22

## 2024-07-02 RX ADMIN — Medication 10 ML: at 20:41

## 2024-07-02 RX ADMIN — TAMSULOSIN HYDROCHLORIDE 0.4 MG: 0.4 CAPSULE ORAL at 12:22

## 2024-07-02 RX ADMIN — HYDROCODONE BITARTRATE AND ACETAMINOPHEN 1 TABLET: 10; 325 TABLET ORAL at 04:26

## 2024-07-02 RX ADMIN — BUMETANIDE 1 MG: 0.25 INJECTION INTRAMUSCULAR; INTRAVENOUS at 08:34

## 2024-07-02 RX ADMIN — INSULIN GLARGINE 23 UNITS: 100 INJECTION, SOLUTION SUBCUTANEOUS at 20:40

## 2024-07-02 RX ADMIN — BUSPIRONE HYDROCHLORIDE 10 MG: 10 TABLET ORAL at 12:22

## 2024-07-02 RX ADMIN — OXYCODONE HYDROCHLORIDE 10 MG: 5 TABLET ORAL at 20:45

## 2024-07-02 RX ADMIN — CETIRIZINE HYDROCHLORIDE 10 MG: 10 TABLET ORAL at 12:22

## 2024-07-02 RX ADMIN — HYDROCODONE BITARTRATE AND ACETAMINOPHEN 1 TABLET: 10; 325 TABLET ORAL at 16:59

## 2024-07-02 RX ADMIN — CARVEDILOL 3.12 MG: 3.12 TABLET, FILM COATED ORAL at 12:22

## 2024-07-02 RX ADMIN — BUMETANIDE 1 MG: 1 TABLET ORAL at 20:40

## 2024-07-02 RX ADMIN — ERTAPENEM 1000 MG: 1 INJECTION INTRAMUSCULAR; INTRAVENOUS at 12:22

## 2024-07-02 RX ADMIN — PROPOFOL 100 MG: 10 INJECTION, EMULSION INTRAVENOUS at 11:28

## 2024-07-02 RX ADMIN — PREGABALIN 100 MG: 100 CAPSULE ORAL at 20:40

## 2024-07-02 RX ADMIN — PREGABALIN 50 MG: 50 CAPSULE ORAL at 12:22

## 2024-07-02 RX ADMIN — LIDOCAINE HYDROCHLORIDE 40 MG: 20 INJECTION, SOLUTION EPIDURAL; INFILTRATION; INTRACAUDAL; PERINEURAL at 11:28

## 2024-07-02 RX ADMIN — CARVEDILOL 3.12 MG: 3.12 TABLET, FILM COATED ORAL at 17:00

## 2024-07-02 RX ADMIN — CALCITRIOL 0.5 MCG: 0.25 CAPSULE ORAL at 12:22

## 2024-07-02 RX ADMIN — SODIUM CHLORIDE 100 ML/HR: 9 INJECTION, SOLUTION INTRAVENOUS at 10:33

## 2024-07-02 RX ADMIN — FLUTICASONE PROPIONATE 1 SPRAY: 50 SPRAY, METERED NASAL at 08:35

## 2024-07-02 RX ADMIN — Medication 10 ML: at 08:48

## 2024-07-02 RX ADMIN — DULOXETINE HYDROCHLORIDE 60 MG: 30 CAPSULE, DELAYED RELEASE ORAL at 12:22

## 2024-07-02 NOTE — PROGRESS NOTES
[facility] Medicine Services  INPATIENT PROGRESS NOTE    Patient Name: Erick Luong  Date of Admission: 6/28/2024  Today's Date: 07/02/24  Length of Stay: 1  Primary Care Physician: Del Shetty MD    Subjective   Chief Complaint: Pain, shortness of breath     69-year-old male with history of diabetes mellitus type 2, atrial fibrillation, coronary artery disease, prior diabetic foot ulcer, diabetic polyneuropathy, hypertension, hyperlipidemia, osteomyelitis of the foot, insomnia, chronic kidney disease stage, obstructive sleep apnea, poor compliance with CPAP, cervical spine stenosis, vitamin D deficiency, admitted last night with multiple complaints.  See history and physical.  Patient is a very poor historian, does not know name of medications that he is taking.  There is no family member present at the time of my evaluation.    Patient feels better.  Reports no chest pain.  No abdominal pain  Shortness of breath resolved  No fevers reported.    Attempted colonoscopy yesterday not able to perform due to poor preparation.  Patient will have repeat colonoscopy today.      Review of Systems   Respiratory:  Positive for shortness of breath.    Gastrointestinal:  Positive for constipation.      All pertinent negatives and positives are as above. All other systems have been reviewed and are negative unless otherwise stated.     Objective    Temp:  [97.4 °F (36.3 °C)-97.9 °F (36.6 °C)] 97.4 °F (36.3 °C)  Heart Rate:  [62-75] 63  Resp:  [11-19] 18  BP: (115-135)/(58-80) 127/62  FiO2 (%):  [100 %] 100 %  Physical Exam  Constitutional:       Appearance: Sitting in chair.  No respiratory distress.  No tachypnea.  Alert, oriented x 3.  HENT:      Head: Normocephalic and atraumatic.      Nose: Nose normal.      Mouth/Throat:      Mouth: Mucous membranes are moist.      Pharynx: Oropharynx is clear.   Eyes:      Extraocular Movements: Extraocular movements intact.       Conjunctiva/sclera: Conjunctivae normal.      Pupils: Pupils are equal, round, and reactive to light.   Cardiovascular:      Rate and Rhythm: irregular rhythm.      Pulses: Normal pulses.   Pulmonary:      Effort: No respiratory distress.      Breath sounds: Decreased breath sounds in both bases. No wheezing  Abdominal:      General: Abdomen is obese.  Bowel sounds are normal.      Palpations: Abdomen is soft.      Tenderness: There is no guarding or rebound.   Musculoskeletal:         General: Normal range of motion.      Cervical back: Normal range of motion and neck supple.   Extremities: Edema lower extremities 1-2+.  Pitting  Skin:     Capillary Refill: Capillary refill takes less than 2 seconds.      Coloration: Skin is not jaundiced.      Findings: No rash.   Neurological:      General: No focal deficit present.      Mental Status: Patient is alert, oriented to place time and person.     Sensory: No sensory deficit.      Motor: No weakness.      Coordination: Coordination normal.   Psychiatric:         Mood and Affect: Mood normal.         Behavior: Behavior normal.           Results Review:  I have reviewed the labs, radiology results, and diagnostic studies.    Laboratory Data:   Results from last 7 days   Lab Units 06/30/24  0638 06/29/24  0850 06/28/24 2009   WBC 10*3/mm3 6.79 7.68 10.18   HEMOGLOBIN g/dL 10.6* 10.1* 10.5*   HEMATOCRIT % 34.1* 32.5* 33.5*   PLATELETS 10*3/mm3 238 215 252        Results from last 7 days   Lab Units 07/02/24  0542 07/01/24  0659 06/30/24  0638 06/29/24  0850 06/28/24 2009 06/27/24  0612   SODIUM mmol/L 143 139 140   < > 140 140   POTASSIUM mmol/L 3.2* 3.5 3.9   < > 4.2 4.3   CHLORIDE mmol/L 101 99 103   < > 104 106   CO2 mmol/L 28.0 28.0 26.0   < > 23.0 22.0   BUN mg/dL 25* 32* 35*   < > 32* 31*   CREATININE mg/dL 1.57* 1.68* 1.86*   < > 2.01* 1.86*   CALCIUM mg/dL 8.6 8.8 9.1   < > 9.1 9.0   BILIRUBIN mg/dL  --   --   --   --  0.3 0.4   ALK PHOS U/L  --   --   --   --   108 108   ALT (SGPT) U/L  --   --   --   --  9 10   AST (SGOT) U/L  --   --   --   --  14 12   GLUCOSE mg/dL 91 151* 230*   < > 177* 394*    < > = values in this interval not displayed.       Culture Data:   Urine Culture   Date Value Ref Range Status   06/27/2024 >100,000 CFU/mL Escherichia coli (A)  Preliminary     Comment:     Possible ESBL, confirmation in progress.       Radiology Data:   Imaging Results (Last 24 Hours)       ** No results found for the last 24 hours. **            I have reviewed the patient's current medications.     Assessment/Plan   Assessment  Active Hospital Problems    Diagnosis     **CHF exacerbation     CHF (congestive heart failure), NYHA class I, acute on chronic, combined     Slow transit constipation     Diarrhea     Rectal bleeding     Anemia due to chronic kidney disease      Acute on chronic heart failure  Urinary tract infection, E. 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  Small left pleural effusion.  Troponin was 51 and second 47, with a elevated proBNP 3200  Admission creatinine 2.01, today 1.68.   Potassium 3.2.    Glucose controlled 91    Hemoglobin stable 10.6.    Urine culture from 6/27/2024, E. Coli ESBL, confirmed (collected in the emergency department during ER visit).      Treatment Plan  Continue diuresis, change Bumex to 1 mg p.o. every 12 hours today starting in the afternoon.    Continue carvedilol 3.125 p.o. twice daily.  Continue Imdur; valsartan on hold for now.  Continue statin.      Ertapenem IV started 6/20/2024, day #4.     Follow renal function and electrolytes.  Replace potassium.    Continue Lantus 25  units subcutaneous at bedtime.  Sliding scale of insulin.    Continue pregabalin as per prior use.    Colonoscopy today.  Follow results.  Consider restarting anticoagulation depending on results.    Discharge planning      Medical Decision Making  Number and Complexity of problems: Ordering 10, moderate to high complexity  Differential Diagnosis: See above    Conditions and Status        Condition is unchanged.     [facility] Data  External documents reviewed: None  Cardiac tracing (EKG, telemetry) interpretation: Atrial fibrillation  Radiology interpretation: Radiology reports reviewed  Labs reviewed: Yes  Any tests that were considered but not ordered: No     Decision rules/scores evaluated (example [id number], [place], etc): See chart     Discussed with: Patient and nurse     Care Planning  Shared decision making: With patient  Code status and discussions: Full code    Disposition  Social Determinants of Health that impact treatment or disposition: Apparently poor compliance  I expect the patient to be discharged to home with home health in 1 days.     Electronically signed by Josué De Oliveira MD, 07/02/24, 10:46 CDT.

## 2024-07-02 NOTE — PROGRESS NOTES
Nephrology ([facility]) Progress Note      Patient:  Erick Luong  YOB: 1955  Date of Service: 7/2/2024  MRN: [id number]   Acct: [id number]   Primary Care Physician: Del Shetty MD  Advance Directive:   Code Status and Medical Interventions:   Ordered at: 06/30/24 1119     Level Of Support Discussed With:    Patient     Code Status (Patient has no pulse and is not breathing):    CPR (Attempt to Resuscitate)     Medical Interventions (Patient has pulse or is breathing):    Full Support     Admit Date: 6/28/2024       Hospital Day: 1  Referring Provider: Latanya Paez MD      Patient personally seen and examined.  Complete chart including Consults, Notes, Operative Reports, Labs, Cardiology, and Radiology studies reviewed as able.        Subjective:  Erick Luong is a 69 y.o. male for whom we were consulted for evaluation and treatment of chronic kidney disease stage IIIb.  He has stage IIIb chronic kidney disease baseline and follows with Dr. Lomas in the office as he has diabetic nephropathy.  He has a history of insulin-dependent type 2 diabetes, hypertension, abdominal obesity, obstructive sleep apnea, diabetic foot ulcer, Vitamin D Deficiency and coronary artery disease.  He had multiple complaints over the last week including no bowel movement x 2 weeks, several falls at home, cystitis, GI bleed with planned colonoscopy coming up on Monday and increasing peripheral edema.  Also had some diffuse abdominal pain from the falls.  Denied hematuria, dysuria, hemoptysis, rash.  Did have bruising and scratches from the falls.  Seen with family for initial evaluation.     Today, he is s/p colonoscopy .  His repeat serum creatinine is 1.5.  He was anxious for discharge.    Allergies:  Cefepime, Bactrim [sulfamethoxazole-trimethoprim], Vancomycin, Zolpidem, and Metronidazole    Home Meds:  Medications Prior to Admission   Medication Sig Dispense Refill Last Dose    ascorbic  acid (VITAMIN C) 1000 MG tablet Take 1 tablet by mouth Daily. 30 tablet 3 Past Week    bumetanide (BUMEX) 2 MG tablet Take 1 tablet by mouth Daily. 90 tablet 0 Past Week    busPIRone (BUSPAR) 10 MG tablet Take 1 tablet by mouth 2 (Two) Times a Day.   Past Week    calcitriol (ROCALTROL) 0.5 MCG capsule Take 1 capsule by mouth Daily. 90 capsule 4 Past Week    carvedilol (COREG) 3.125 MG tablet Take 1 tablet twice a day by oral route. 60 tablet 4 Past Week    Diclofenac Sodium (VOLTAREN) 1 % gel gel Apply 2 g topically to the appropriate area as directed 4 (Four) Times a Day As Needed. 300 g 11 Past Month    donepezil (ARICEPT) 10 MG tablet Take 1 tablet by mouth Daily. 90 tablet 1 Past Week    DULoxetine (CYMBALTA) 60 MG capsule Take 1 capsule by mouth Daily. 90 capsule 4 Past Week    famotidine (PEPCID) 20 MG tablet Take 1 tablet twice a day by oral route. 180 tablet 4 Past Week    fluticasone (FLONASE) 50 MCG/ACT nasal spray Instill 1 spray in each nostril as directed by provider Daily. 16 g 1 Past Week    Insulin Glargine (Lantus SoloStar) 100 UNIT/ML injection pen Inject 20 Units under the skin into the appropriate area as directed every night at bedtime. 15 mL 3 Past Week    Insulin Regular Human, Conc, (HumuLIN R) 500 UNIT/ML solution pen-injector CONCENTRATED injection Inject 15 Units under the skin into the appropriate area as directed 3 (Three) Times a Day Before Meals.   Past Week    Insulin Regular Human, Conc, (HumuLIN R) 500 UNIT/ML solution pen-injector CONCENTRATED injection Inject 40 Units under the skin into the appropriate area as directed 3 (Three) Times a Day Before Meals. Inject 40 units under the skin in the the appropriate area with regular meals AND 40 units with large meals.   Past Week    Iron-Vitamin C (Vitron-C)  MG tablet Take 1 tablet twice a day by oral route. 60 tablet 2 Past Week    lactulose (Enulose) 10 GM/15ML solution solution (encephalopathy) Take 30 mL by mouth 3 times a  day for 30 days. 2838 mL 11 Past Week    levocetirizine (XYZAL) 5 MG tablet Take 1 tablet by mouth every day at bedtime. 30 tablet 2 Past Week    levoFLOXacin (LEVAQUIN) 750 MG tablet Take 1 tablet by mouth Daily. 7 tablet 0 Past Week    melatonin 3 MG tablet Take 2 tablets by mouth At Night As Needed for Sleep. 30 tablet 0 Past Week    midodrine (PROAMATINE) 10 MG tablet Take 1 tablet by mouth 3 (Three) Times a Day. 270 tablet 4 Past Week    oxyCODONE (ROXICODONE) 10 MG tablet Take 1 tablet by mouth 2 (Two) Times a Day As Needed---MUST last 30 days 55 tablet 0 Past Week    pantoprazole (PROTONIX) 40 MG EC tablet Take 1 tablet by mouth Every 12 (Twelve) Hours. 180 tablet 4 Past Week    polyethylene glycol (MIRALAX) 17 GM/SCOOP powder Take 17 g by mouth Daily As Needed (constipation).   Past Week    pregabalin (LYRICA) 100 MG capsule Take 2 capsules by mouth Every Night.   Past Week    pregabalin (LYRICA) 50 MG capsule Take 1 capsule by mouth Daily.   Past Week    rosuvastatin (CRESTOR) 10 MG tablet Take 1 tablet by mouth Every Night. 30 tablet 2 Past Week    sennosides-docusate (PERICOLACE) 8.6-50 MG per tablet Take 1 tablet by mouth Every Night. Obtain OTC   Past Week    sodium hypochlorite (DAKIN'S 1/4 STRENGTH) 0.125 % solution topical solution 0.125% Apply 1 application topically to the appropriate area as directed 2 (Two) Times a Day. 473 mL 5 Past Week    sucralfate (CARAFATE) 1 g tablet Take 1 tablet by mouth 4 (Four) Times a Day before meals. 360 tablet 1 Past Week    tamsulosin (FLOMAX) 0.4 MG capsule 24 hr capsule Take 1 capsule by mouth Daily. 90 capsule 1 Past Week    valsartan (DIOVAN) 40 MG tablet Take 1 tablet by mouth Every Night. 30 tablet 2 Past Week    zinc sulfate (ZINCATE) 50 MG capsule Take 1 capsule by mouth Daily.   Past Week    nitroglycerin (NITROSTAT) 0.4 MG SL tablet Place 1 tablet under the tongue Every 5 (Five) Minutes As Needed for Chest Pain. Take no more than 3 doses in 15 minutes.    Unknown       Medicines:  Current Facility-Administered Medications   Medication Dose Route Frequency Provider Last Rate Last Admin    acetaminophen (TYLENOL) tablet 650 mg  650 mg Oral Q4H PRN Latanya Paez MD        Or    acetaminophen (TYLENOL) 160 MG/5ML oral solution 650 mg  650 mg Oral Q4H PRN Latanya Paez MD        Or    acetaminophen (TYLENOL) suppository 650 mg  650 mg Rectal Q4H PRN Latanya Paez MD        sennosides-docusate (PERICOLACE) 8.6-50 MG per tablet 2 tablet  2 tablet Oral BID PRN Latanya Paez MD        And    polyethylene glycol (MIRALAX) packet 17 g  17 g Oral Daily PRN Latanya Paez MD   17 g at 06/29/24 1305    And    bisacodyl (DULCOLAX) EC tablet 5 mg  5 mg Oral Daily PRN Latanya Paez MD   5 mg at 06/29/24 1304    And    bisacodyl (DULCOLAX) suppository 10 mg  10 mg Rectal Daily PRN Latanya Paez MD   10 mg at 06/29/24 1840    bumetanide (BUMEX) tablet 1 mg  1 mg Oral BID Josué De Oliveira MD        busPIRone (BUSPAR) tablet 10 mg  10 mg Oral BID Josué De Oliveira MD   10 mg at 07/02/24 1222    calcitriol (ROCALTROL) capsule 0.5 mcg  0.5 mcg Oral Daily Josué De Oliveira MD   0.5 mcg at 07/02/24 1222    Calcium Replacement - Follow Nurse / BPA Driven Protocol   Does not apply PRN Latanya Paez MD        carvedilol (COREG) tablet 3.125 mg  3.125 mg Oral BID With Meals Donna Roman, SUSAN   3.125 mg at 07/02/24 1222    cetirizine (zyrTEC) tablet 10 mg  10 mg Oral Daily Josué De Oliveira MD   10 mg at 07/02/24 1222    dextrose (D50W) (25 g/50 mL) IV injection 25 g  25 g Intravenous Q15 Min PRN Latanya Paez MD        dextrose (GLUTOSE) oral gel 15 g  15 g Oral Q15 Min PRN Latanya Paez MD        DULoxetine (CYMBALTA) DR capsule 60 mg  60 mg Oral Daily Josué De Oliveira MD   60 mg at 07/02/24 1222    ertapenem (INVanz) 1,000 mg in sodium chloride 0.9 % 100 mL MBP  1,000 mg Intravenous Q24H Josué De Oliveira  mL/hr at 07/02/24 1222 1,000 mg at 07/02/24 1222     fluticasone (FLONASE) 50 MCG/ACT nasal spray 1 spray  1 spray Nasal Daily Josué De Oliveira MD   1 spray at 07/02/24 0835    glucagon (GLUCAGEN) injection 1 mg  1 mg Intramuscular Q15 Min PRN Latanya Paez MD        HYDROcodone-acetaminophen (NORCO)  MG per tablet 1 tablet  1 tablet Oral Q6H PRN Latanya Paez MD   1 tablet at 07/02/24 0426    insulin glargine (LANTUS, SEMGLEE) injection 25 Units  25 Units Subcutaneous Nightly Josué De Oliveira MD   25 Units at 07/01/24 2021    Insulin Lispro (humaLOG) injection 2-7 Units  2-7 Units Subcutaneous 4x Daily AC & at Bedtime Latanya Paez MD   3 Units at 07/01/24 2028    isosorbide mononitrate (IMDUR) 24 hr tablet 30 mg  30 mg Oral Daily Josué De Oliveira MD   30 mg at 07/02/24 1222    Magnesium Standard Dose Replacement - Follow Nurse / BPA Driven Protocol   Does not apply PRN Latanya Paez MD        nitroglycerin (NITROSTAT) SL tablet 0.4 mg  0.4 mg Sublingual Q5 Min PRN Latanya Paez MD        ondansetron (ZOFRAN) injection 4 mg  4 mg Intravenous Q6H PRN Latanya Paez MD   4 mg at 06/29/24 1714    oxyCODONE (ROXICODONE) immediate release tablet 10 mg  10 mg Oral BID PRN Latanya Paez MD   10 mg at 06/30/24 2054    pantoprazole (PROTONIX) EC tablet 40 mg  40 mg Oral QAM AC Josué De Oliveira MD   40 mg at 06/30/24 0823    Phosphorus Replacement - Follow Nurse / BPA Driven Protocol   Does not apply PRN Latanya Paez MD        potassium chloride (MICRO-K/KLOR-CON) CR capsule  40 mEq Oral Q4H Josué De Oliveira MD   40 mEq at 07/02/24 1225    Potassium Replacement - Follow Nurse / BPA Driven Protocol   Does not apply PRN Latanya Paez MD        pregabalin (LYRICA) capsule 100 mg  100 mg Oral Nightly Latanya Paez MD   100 mg at 07/01/24 2017    pregabalin (LYRICA) capsule 50 mg  50 mg Oral Daily Josué De Oliveira MD   50 mg at 07/02/24 1222    rosuvastatin (CRESTOR) tablet 10 mg  10 mg Oral Daily Josué De Oliveira MD   10 mg at 07/02/24 1222     sodium chloride 0.9 % flush 10 mL  10 mL Intravenous Q12H Latanya Paez MD   10 mL at 07/02/24 0848    sodium chloride 0.9 % flush 10 mL  10 mL Intravenous PRN Latanya Paez MD        sodium chloride 0.9 % infusion 40 mL  40 mL Intravenous PRN Latanya Paez MD        sodium chloride 0.9 % infusion 500 mL  500 mL Intravenous Continuous PRN Lamine Villar CRNA 25 mL/hr at 07/02/24 1122 Currently Infusing at 07/02/24 1122    tamsulosin (FLOMAX) 24 hr capsule 0.4 mg  0.4 mg Oral Daily Josué De Oliveira MD   0.4 mg at 07/02/24 1222    timolol (TIMOPTIC) 0.5 % ophthalmic solution 1 drop  1 drop Both Eyes BID Josué De Oliveira MD   1 drop at 07/01/24 2017       Past Medical History:  Past Medical History:   Diagnosis Date    Arthritis     Autonomic disease     CAD (coronary artery disease) 02/06/2017    Cervical radiculopathy 09/16/2021    Chronic constipation with acute exaccerbation 05/10/2021    Coronary artery disease     Degeneration of cervical intervertebral disc 08/11/2021    Diabetes mellitus     Diabetic foot ulcer 08/31/2020    Diabetic polyneuropathy associated with type 2 diabetes mellitus 01/18/2021    Elevated cholesterol     Gastroesophageal reflux disease 05/13/2019    Headache     HTN (hypertension), benign 05/03/2017    Hyperlipidemia     Hypertension     Mixed hyperlipidemia 02/07/2017    Multiple lung nodules 01/26/2020    5mm, 9 mm RLL identified 1/2020, not present 10/2019.    Myocardial infarction     Osteomyelitis 01/22/2020    Osteomyelitis of fifth toe of right foot 10/07/2019    Pancreatitis     Persistent insomnia 01/20/2020    Renal disorder     Sleep apnea 02/06/2017    Sleep apnea with use of continuous positive airway pressure (CPAP)     NON-COMPLIANT    Slow transit constipation 01/16/2019    Spinal stenosis in cervical region 09/16/2021    Vitamin D deficiency 03/02/2021       Past Surgical History:  Past Surgical History:   Procedure Laterality Date    ABDOMINAL SURGERY       AMPUTATION FOOT / TOE Left 10/2021    5th digit     ANTERIOR CERVICAL DISCECTOMY W/ FUSION N/A 08/05/2022    Procedure: CERVICAL DISCECTOMY ANTERIOR WITH FUSION C5-6 with possible plating of C5-7 with neuromonitoring and 1 c-arm;  Surgeon: Karel Soliz MD;  Location: [facility] OR;  Service: Neurosurgery;  Laterality: N/A;    APPENDECTOMY      BACK SURGERY      CARDIAC CATHETERIZATION Left 02/08/2021    Procedure: Left Heart Cath w poss intervention left anatomical snuff box acess;  Surgeon: Omkar Charles DO;  Location: [facility] CATH INVASIVE LOCATION;  Service: Cardiology;  Laterality: Left;    CARDIAC SURGERY      CATARACT EXTRACTION      CERVICAL SPINE SURGERY      COLONOSCOPY N/A 01/31/2017    Normal exam repeat in 5 years    COLONOSCOPY N/A 02/11/2019    Mild acute inflammation    COLONOSCOPY N/A 04/07/2024    2 areas at 10 and 20 cm with friability ulceration 2 clips placed at 20 cm and 4 clips at 10 cm poor prep normal mucosa,mild eroisions and ulcerations in visible vessels    COLONOSCOPY N/A 7/1/2024    Procedure: COLONOSCOPY WITH ANESTHESIA;  Surgeon: Arsalan Lorenzo DO;  Location: [facility] ENDOSCOPY;  Service: Gastroenterology;  Laterality: N/A;  pre op constipation/diarrhea  post poor prep  pcp Del Shetty    COLONOSCOPY W/ POLYPECTOMY  03/04/2014    Hyperplastic polyp    CORONARY ARTERY BYPASS GRAFT  10/2015    ENDOSCOPY  04/13/2011    Gastritis with hemorrhage    ENDOSCOPY N/A 05/05/2017    Normal exam    ENDOSCOPY N/A 02/11/2019    Gastritis    ENDOSCOPY N/A 09/01/2020    Non-erosive gastritis with hemorrhage    ENDOSCOPY N/A 02/10/2021    Esophagitis    ENDOSCOPY N/A 04/11/2024    There were esophageal mucosal changes suspicious for short-segment Low's esophagus present in the distal esophagus. The maximum longitudinal extent of these mucosal changes was 2 cm in length. Mucosa was biopsied with a cold forceps for histologyDistal esophagus, biopsies: Mild chronic active  esophagogastritis. No evidence of intestinal metaplasia, dysplasia. Antrum, bx, Mild chronic gastritis    FOOT SURGERY Left     INCISION AND DRAINAGE OF WOUND Left 2007    spider bite       Family History  Family History   Problem Relation Age of Onset    Colon cancer Father     Heart disease Father     Colon cancer Sister     Colon polyps Sister     Alzheimer's disease Mother     Coronary artery disease Sister     Coronary artery disease Sister        Social History  Social History     Socioeconomic History    Marital status:    Tobacco Use    Smoking status: Former     Current packs/day: 0.00     Types: Cigarettes     Quit date:      Years since quittin.5    Smokeless tobacco: Never    Tobacco comments:     smoked in [place]   Vaping Use    Vaping status: Never Used   Substance and Sexual Activity    Alcohol use: No    Drug use: No    Sexual activity: Defer       Review of Systems:  History obtained from chart review and the patient  General ROS: No fever or chills  Respiratory ROS: No cough, shortness of breath, wheezing  Cardiovascular ROS: No chest pain or palpitations  Gastrointestinal ROS: No abdominal pain or melena  Genito-Urinary ROS: No dysuria or hematuria  Psych ROS: No anxiety and depression  14 point ROS reviewed with the patient and negative except as noted above and in the HPI unless unable to obtain.    Objective:  Patient Vitals for the past 24 hrs:   BP Temp Temp src Pulse Resp SpO2   24 1150 127/69 -- -- 70 11 98 %   24 1145 125/62 -- -- 71 14 100 %   24 1140 125/65 -- -- 68 13 100 %   24 1138 124/68 -- -- 67 13 100 %   24 0750 127/62 97.4 °F (36.3 °C) Oral 63 18 99 %   24 0418 124/65 97.9 °F (36.6 °C) Oral 75 18 98 %   24 0010 115/58 97.5 °F (36.4 °C) Oral 67 16 96 %   24 1947 135/74 97.5 °F (36.4 °C) Oral 68 18 97 %       Intake/Output Summary (Last 24 hours) at 2024 1507  Last data filed at 2024 1909  Gross per 24  hour   Intake --   Output 680 ml   Net -680 ml     General: awake/alert   Chest:  clear to auscultation bilaterally without respiratory distress  CVS: regular rate and rhythm  Abdominal: soft, nontender, positive bowel sounds  Extremities: ble edema  Skin: warm and dry without rash      Labs:  Results from last 7 days   Lab Units 06/30/24  0638 06/29/24  0850 06/28/24 2009   WBC 10*3/mm3 6.79 7.68 10.18   HEMOGLOBIN g/dL 10.6* 10.1* 10.5*   HEMATOCRIT % 34.1* 32.5* 33.5*   PLATELETS 10*3/mm3 238 215 252         Results from last 7 days   Lab Units 07/02/24  0542 07/01/24  0659 06/30/24  0638 06/29/24  0850 06/28/24 2009 06/27/24  0612   SODIUM mmol/L 143 139 140   < > 140 140   POTASSIUM mmol/L 3.2* 3.5 3.9   < > 4.2 4.3   CHLORIDE mmol/L 101 99 103   < > 104 106   CO2 mmol/L 28.0 28.0 26.0   < > 23.0 22.0   BUN mg/dL 25* 32* 35*   < > 32* 31*   CREATININE mg/dL 1.57* 1.68* 1.86*   < > 2.01* 1.86*   CALCIUM mg/dL 8.6 8.8 9.1   < > 9.1 9.0   EGFR mL/min/1.73 47.7* 44.0* 38.9*   < > 35.5* 38.9*   BILIRUBIN mg/dL  --   --   --   --  0.3 0.4   ALK PHOS U/L  --   --   --   --  108 108   ALT (SGPT) U/L  --   --   --   --  9 10   AST (SGOT) U/L  --   --   --   --  14 12   GLUCOSE mg/dL 91 151* 230*   < > 177* 394*    < > = values in this interval not displayed.       Radiology:   Imaging Results (Last 72 Hours)       ** No results found for the last 72 hours. **            Culture:  Urine Culture   Date Value Ref Range Status   06/27/2024 >100,000 CFU/mL Escherichia coli ESBL (A)  Final     Comment:       Consider infectious disease consult.  Susceptibility results may not correlate to clinical outcomes.         Assessment     Chronic kidney disease stage IIIb- renal function is improving  Diabetes type 2 with diabetic nephropathy  Acute on chronic diastolic congestive heart failure  Secondary hyperparathyroidism  Anemia and chronic kidney disease  CAD  Atrial Fibrillation  Hypertension  UTI        Plan:  Discussed with  patient, nursing  Work-up reviewed today  Switched to oral diuretics   Cardiology evaluation reviewed  S/p course of Levaquin, repeat UA.        Vinny Hartley MD  7/2/2024  15:07 CDT

## 2024-07-02 NOTE — PLAN OF CARE
Goal Outcome Evaluation:           Progress: no change     Alert and oriented x4. Forgetful at times. SBA. Wounds noted to LLE. NPO for colonoscopy. VSS. Safety maintained.

## 2024-07-02 NOTE — PROGRESS NOTES
[facility]    Date:  07/02/24     Vitals:    07/02/24 1150   BP: 127/69   Pulse: 70   Resp: 11   Temp:    SpO2: 98%        Indication:  Heart Failure    Procedure:  ReDS device sensor unit applied to right side of chest and right side of back.  Appropriate positioning confirmed based off of the unit's calculation.  Chest measurement obtained with the chest size ruler.  Measurement session performed over 45 seconds.      Results:  ReDS Value=34    Interpretation:  25-35% - normal/ideal lung fluid content    Jackie Esteves, SONA 07/02/24 13:34 CDT

## 2024-07-02 NOTE — PLAN OF CARE
Goal Outcome Evaluation:           Progress: no change    Pt A/Ox4. VSS on RA. IV to RFA, IID. Standby assist. Wound to L heel and toe, clean,dry, and intact. Podiatry consult, new wound care orders in place. Boot in place when walking PRN. Tele- running A-fib. Voiding via urinal. Colonoscopy completed. IV ABX given. PRN med given for mild pain. Up in chair. Tolerated clear liquids, changed to regular, diabetic diet. ReDs Vest completed. Strict I & O. Safety maintained. Call Light in reach.

## 2024-07-02 NOTE — CONSULTS
[facility] - PODIATRY    Today's Date: 07/02/24     Patient Name: Erick Luong  MRN: [id number]  CSN: [id number]  PCP: Del Shetty MD  Referring Provider: Latanya Paez MD  Attending Provider: Josué De Oliveira MD  Length of Stay: 1    SUBJECTIVE   Chief Complaint: Left foot wounds    HPI: Erick Luong, a 69 y.o.male, who presented to the ED with abdominal pain.  He has a history of coronary artery disease, diabetes mellitus, diabetic foot ulcer, hyperlipidemia, hypertension, lung nodules, pancreatitis, osteomyelitis, renal disorder, sleep apnea, spinal stenosis.  Patient reports he has had these wounds for quite some time.  He currently is followed by wound care at [facility] and Dr. Gomes.  He reports his wounds have greatly improved with routine wound care.  He denies pain.  Reports numbness and tingling.      Past Medical History:   Diagnosis Date    Arthritis     Autonomic disease     CAD (coronary artery disease) 02/06/2017    Cervical radiculopathy 09/16/2021    Chronic constipation with acute exaccerbation 05/10/2021    Coronary artery disease     Degeneration of cervical intervertebral disc 08/11/2021    Diabetes mellitus     Diabetic foot ulcer 08/31/2020    Diabetic polyneuropathy associated with type 2 diabetes mellitus 01/18/2021    Elevated cholesterol     Gastroesophageal reflux disease 05/13/2019    Headache     HTN (hypertension), benign 05/03/2017    Hyperlipidemia     Hypertension     Mixed hyperlipidemia 02/07/2017    Multiple lung nodules 01/26/2020    5mm, 9 mm RLL identified 1/2020, not present 10/2019.    Myocardial infarction     Osteomyelitis 01/22/2020    Osteomyelitis of fifth toe of right foot 10/07/2019    Pancreatitis     Persistent insomnia 01/20/2020    Renal disorder     Sleep apnea 02/06/2017    Sleep apnea with use of continuous positive airway pressure (CPAP)     NON-COMPLIANT    Slow transit constipation 01/16/2019    Spinal  stenosis in cervical region 09/16/2021    Vitamin D deficiency 03/02/2021     Past Surgical History:   Procedure Laterality Date    ABDOMINAL SURGERY      AMPUTATION FOOT / TOE Left 10/2021    5th digit     ANTERIOR CERVICAL DISCECTOMY W/ FUSION N/A 08/05/2022    Procedure: CERVICAL DISCECTOMY ANTERIOR WITH FUSION C5-6 with possible plating of C5-7 with neuromonitoring and 1 c-arm;  Surgeon: Karel Soliz MD;  Location: [facility] OR;  Service: Neurosurgery;  Laterality: N/A;    APPENDECTOMY      BACK SURGERY      CARDIAC CATHETERIZATION Left 02/08/2021    Procedure: Left Heart Cath w poss intervention left anatomical snuff box acess;  Surgeon: Omkar Charles DO;  Location:  [facility] CATH INVASIVE LOCATION;  Service: Cardiology;  Laterality: Left;    CARDIAC SURGERY      CATARACT EXTRACTION      CERVICAL SPINE SURGERY      COLONOSCOPY N/A 01/31/2017    Normal exam repeat in 5 years    COLONOSCOPY N/A 02/11/2019    Mild acute inflammation    COLONOSCOPY N/A 04/07/2024    2 areas at 10 and 20 cm with friability ulceration 2 clips placed at 20 cm and 4 clips at 10 cm poor prep normal mucosa,mild eroisions and ulcerations in visible vessels    COLONOSCOPY N/A 7/1/2024    Procedure: COLONOSCOPY WITH ANESTHESIA;  Surgeon: Arsalan Lorenzo DO;  Location: [facility] ENDOSCOPY;  Service: Gastroenterology;  Laterality: N/A;  pre op constipation/diarrhea  post poor prep  pcp Del Shetty    COLONOSCOPY W/ POLYPECTOMY  03/04/2014    Hyperplastic polyp    CORONARY ARTERY BYPASS GRAFT  10/2015    ENDOSCOPY  04/13/2011    Gastritis with hemorrhage    ENDOSCOPY N/A 05/05/2017    Normal exam    ENDOSCOPY N/A 02/11/2019    Gastritis    ENDOSCOPY N/A 09/01/2020    Non-erosive gastritis with hemorrhage    ENDOSCOPY N/A 02/10/2021    Esophagitis    ENDOSCOPY N/A 04/11/2024    There were esophageal mucosal changes suspicious for short-segment Low's esophagus present in the distal esophagus. The maximum longitudinal extent of  "these mucosal changes was 2 cm in length. Mucosa was biopsied with a cold forceps for histologyDistal esophagus, biopsies: Mild chronic active esophagogastritis. No evidence of intestinal metaplasia, dysplasia. Antrum, bx, Mild chronic gastritis    FOOT SURGERY Left     INCISION AND DRAINAGE OF WOUND Left 2007    spider bite     Family History   Problem Relation Age of Onset    Colon cancer Father     Heart disease Father     Colon cancer Sister     Colon polyps Sister     Alzheimer's disease Mother     Coronary artery disease Sister     Coronary artery disease Sister      Social History     Socioeconomic History    Marital status:    Tobacco Use    Smoking status: Former     Current packs/day: 0.00     Types: Cigarettes     Quit date:      Years since quittin.5    Smokeless tobacco: Never    Tobacco comments:     smoked in [employer]   Vaping Use    Vaping status: Never Used   Substance and Sexual Activity    Alcohol use: No    Drug use: No    Sexual activity: Defer     Allergies   Allergen Reactions    Cefepime Hives and Anaphylaxis    Bactrim [Sulfamethoxazole-Trimethoprim] Other (See Comments)     \"RENAL FAILURE\"    Vancomycin Itching    Zolpidem Mental Status Change     \"makes him crazy\"    Metronidazole Rash     Current Facility-Administered Medications   Medication Dose Route Frequency Provider Last Rate Last Admin    acetaminophen (TYLENOL) tablet 650 mg  650 mg Oral Q4H PRN Latanya Paez MD        Or    acetaminophen (TYLENOL) 160 MG/5ML oral solution 650 mg  650 mg Oral Q4H PRN Latanya Paez MD        Or    acetaminophen (TYLENOL) suppository 650 mg  650 mg Rectal Q4H PRN Latanya Paez MD        sennosides-docusate (PERICOLACE) 8.6-50 MG per tablet 2 tablet  2 tablet Oral BID PRN Latanya Paez MD        And    polyethylene glycol (MIRALAX) packet 17 g  17 g Oral Daily PRN Latanya Paez MD   17 g at 24 1305    And    bisacodyl (DULCOLAX) EC tablet 5 mg  5 mg Oral Daily PRN " Latanya Paez MD   5 mg at 06/29/24 1304    And    bisacodyl (DULCOLAX) suppository 10 mg  10 mg Rectal Daily PRN Latanya Paez MD   10 mg at 06/29/24 1840    bumetanide (BUMEX) tablet 1 mg  1 mg Oral BID Josué De Oliveira MD        busPIRone (BUSPAR) tablet 10 mg  10 mg Oral BID Josué De Oliveira MD   10 mg at 07/02/24 1222    calcitriol (ROCALTROL) capsule 0.5 mcg  0.5 mcg Oral Daily Josué De Oliveira MD   0.5 mcg at 07/02/24 1222    Calcium Replacement - Follow Nurse / BPA Driven Protocol   Does not apply PRN Latanya Paez MD        carvedilol (COREG) tablet 3.125 mg  3.125 mg Oral BID With Meals Donna Roman, SUSAN   3.125 mg at 07/02/24 1222    cetirizine (zyrTEC) tablet 10 mg  10 mg Oral Daily Josué De Oliveira MD   10 mg at 07/02/24 1222    dextrose (D50W) (25 g/50 mL) IV injection 25 g  25 g Intravenous Q15 Min PRN Latanya Paez MD        dextrose (GLUTOSE) oral gel 15 g  15 g Oral Q15 Min PRN Latanya Paez MD        DULoxetine (CYMBALTA) DR capsule 60 mg  60 mg Oral Daily Josué De Oliveira MD   60 mg at 07/02/24 1222    ertapenem (INVanz) 1,000 mg in sodium chloride 0.9 % 100 mL MBP  1,000 mg Intravenous Q24H Josué De Oliveira  mL/hr at 07/02/24 1222 1,000 mg at 07/02/24 1222    fluticasone (FLONASE) 50 MCG/ACT nasal spray 1 spray  1 spray Nasal Daily Josué De Oliveira MD   1 spray at 07/02/24 0835    glucagon (GLUCAGEN) injection 1 mg  1 mg Intramuscular Q15 Min PRN Latanya Paez MD        HYDROcodone-acetaminophen (NORCO)  MG per tablet 1 tablet  1 tablet Oral Q6H PRN Latanya Paez MD   1 tablet at 07/02/24 0426    insulin glargine (LANTUS, SEMGLEE) injection 25 Units  25 Units Subcutaneous Nightly Josué De Oliveira MD   25 Units at 07/01/24 2021    Insulin Lispro (humaLOG) injection 2-7 Units  2-7 Units Subcutaneous 4x Daily AC & at Bedtime Latanya Paez MD   3 Units at 07/01/24 2028    isosorbide mononitrate (IMDUR) 24 hr tablet 30 mg  30 mg Oral Daily Alvino  Josué YI MD   30 mg at 07/02/24 1222    Magnesium Standard Dose Replacement - Follow Nurse / BPA Driven Protocol   Does not apply PRN Latanya Paez MD        nitroglycerin (NITROSTAT) SL tablet 0.4 mg  0.4 mg Sublingual Q5 Min PRN Latanya Paez MD        ondansetron (ZOFRAN) injection 4 mg  4 mg Intravenous Q6H PRN Latanya Paez MD   4 mg at 06/29/24 1714    oxyCODONE (ROXICODONE) immediate release tablet 10 mg  10 mg Oral BID PRN Latanya Paez MD   10 mg at 06/30/24 2054    pantoprazole (PROTONIX) EC tablet 40 mg  40 mg Oral QAM AC Josué De Oliveira MD   40 mg at 06/30/24 0823    Phosphorus Replacement - Follow Nurse / BPA Driven Protocol   Does not apply PRN Latanya Paez MD        potassium chloride (MICRO-K/KLOR-CON) CR capsule  40 mEq Oral Q4H Josué De Oliveira MD   40 mEq at 07/02/24 1225    Potassium Replacement - Follow Nurse / BPA Driven Protocol   Does not apply PRN Latanya Paez MD        pregabalin (LYRICA) capsule 100 mg  100 mg Oral Nightly Latanya Paez MD   100 mg at 07/01/24 2017    pregabalin (LYRICA) capsule 50 mg  50 mg Oral Daily Josué De Oliveira MD   50 mg at 07/02/24 1222    rosuvastatin (CRESTOR) tablet 10 mg  10 mg Oral Daily Josué De Oliveira MD   10 mg at 07/02/24 1222    sodium chloride 0.9 % flush 10 mL  10 mL Intravenous Q12H Latanya Paez MD   10 mL at 07/02/24 0848    sodium chloride 0.9 % flush 10 mL  10 mL Intravenous PRN Latanya Paez MD        sodium chloride 0.9 % infusion 40 mL  40 mL Intravenous PRN Latanya Paez MD        sodium chloride 0.9 % infusion 500 mL  500 mL Intravenous Continuous PRN Lamine Villar CRNA 25 mL/hr at 07/02/24 1122 Currently Infusing at 07/02/24 1122    tamsulosin (FLOMAX) 24 hr capsule 0.4 mg  0.4 mg Oral Daily Josué De Oliveira MD   0.4 mg at 07/02/24 1222    timolol (TIMOPTIC) 0.5 % ophthalmic solution 1 drop  1 drop Both Eyes BID Josué De Oliveira MD   1 drop at 07/01/24 2017     Review of Systems   Constitutional:   Negative for activity change.   HENT:  Negative for congestion.    Respiratory:  Negative for apnea and shortness of breath.    Cardiovascular:  Positive for leg swelling.   Gastrointestinal:  Negative for abdominal pain.   Musculoskeletal:  Positive for arthralgias. Negative for back pain.   Skin:  Positive for color change and wound.   Neurological:  Positive for weakness and numbness.   Psychiatric/Behavioral:  Negative for agitation, behavioral problems and confusion.        OBJECTIVE     Vitals:    07/02/24 1558   BP: 145/71   Pulse: 71   Resp: 16   Temp: 97.8 °F (36.6 °C)   SpO2: 98%       PHYSICAL EXAM  GEN:   Pt presents up in bedside chair. Accompanied by none.     Foot/Ankle Exam    GENERAL  Appearance:  appears stated age  Orientation:  AAOx3  Affect:  appropriate    VASCULAR     Right Foot Vascularity   Dorsalis pedis:  2+  Posterior tibial:  2+  Skin temperature:  warm  Edema grading:  Trace  CFT:  3  Varicosities:  none     Left Foot Vascularity   Posterior tibial:  2+  Skin temperature:  warm  Edema grading:  Trace  CFT:  3  Pedal hair growth:  Absent  Varicosities:  none     NEUROLOGIC     Right Foot Neurologic   Light touch sensation: diminished  Vibratory sensation: diminished  Hot/Cold sensation: diminished     Left Foot Neurologic   Light touch sensation: diminished  Vibratory sensation: diminished  Hot/Cold sensation:  diminished    MUSCULOSKELETAL     Right Foot Musculoskeletal   Tenderness:  none       Left Foot Musculoskeletal    Amputation   Toes amputated: fifth toe  Tenderness:  none    MUSCLE STRENGTH     Right Foot Muscle Strength   Foot dorsiflexion:  5  Foot plantar flexion:  5  Foot inversion:  5  Foot eversion:  5     Left Foot Muscle Strength   Foot dorsiflexion:  4+  Foot plantar flexion:  4+  Foot inversion:  4+  Foot eversion:  4+    DERMATOLOGIC      Right Foot Dermatologic   Skin  Positive for wound.      Left Foot Dermatologic   Skin  Positive for wound.      Left foot  additional comments: Multiple wounds to left foot.  Scant amount of serosanguinous drainage.   See photos               RADIOLOGY/NUCLEAR:  XR Chest 1 View    Result Date: 6/28/2024  Narrative: EXAM: XR CHEST 1 VW- 6/28/2024 7:03 PM  HISTORY: SOA Triage Protocol   COMPARISON: 5/3/2024.  TECHNIQUE: Single frontal radiograph of the chest was obtained.  FINDINGS:  Support Devices: Prior median sternotomy.  Cardiac and Mediastinal Silhouettes: Normal.  Lungs/Pleura: Mild interstitial prominence. Suspected small right pleural effusion. No visible pneumothorax.  Osseous structures: No acute osseous finding.  Other: None.      Impression:  Suspected small left pleural effusion.  Mild interstitial prominence may suggest mild edema.    This report was signed and finalized on 6/28/2024 9:16 PM by Ronal Wisdom.      CT Abdomen Pelvis Stone Protocol    Result Date: 6/27/2024  Narrative: EXAMINATION: CT ABDOMEN PELVIS STONE PROTOCOL-  6/27/2024 8:58 AM  HISTORY: abd pain; R10.9-Unspecified abdominal pain; K59.00-Constipation, unspecified; N39.0-Urinary tract infection, site not specified difficulty with urination and defecation for the last 2 weeks  COMPARISON: 5/20/2024  DLP: 1691.36 mGy.cm  In order to have a CT radiation dose as low as reasonably achievable, Automated Exposure Control was utilized for adjustment of the mA and/or KV according to patient size.  TECHNIQUE: Noncontrast images of the abdomen and pelvis obtained without oral contrast. Sagittal and coronal reformatted images were obtained and reviewed as well.  FINDINGS: This examination is somewhat limited by the lack of intravenous contrast.  Moderate bilateral pleural effusions noted. Mild cardiomegaly with extensive calcified atherosclerotic vascular disease in the coronary arteries. Dependent changes in both lung bases are suspected.  Artifact from the patient's arms overlies the liver. I don't see any definite acute abnormality in the liver. The gallbladder  is surgically absent. Fatty infiltration of the pancreas without a focal lesion or definite ductal dilation. The spleen and adrenal glands are without acute findings. Mild perinephric fat stranding adjacent to both kidneys. RIGHT renal cyst redemonstrated. No hydronephrosis or hydroureter.  Atherosclerotic vascular disease in the aorta. No definite retroperitoneal mass. Some small retroperitoneal lymph nodes are unchanged. No retroperitoneal hemorrhage identified.  There is stool in the rectum. Surgical clips are also noted in the upper rectum, likely from previous biopsy. Moderate stool in the colon. Proximal aspect of the appendix is normal in appearance. The distal appendix is not as well seen but I don't see any evidence of appendicitis. No evidence of a bowel obstruction. No definite gastric abnormality.  No free fluid in the abdomen or pelvis. No definite mesenteric mass or adenopathy identified. Surrounding soft tissues demonstrates some fat stranding along the anterior abdominal wall, unchanged from 5/20/2024. Mild diffuse body wall edema is noted. Small fat-containing RIGHT inguinal hernia.  No definite pelvic mass or adenopathy. No free or loculated fluid collection either. The urinary bladder is partially distended with urine. No hydroureter.  Median sternotomy wires are noted. No acute findings in the osseous structures. Multilevel degenerative disc disease noted. No destructive bone lesion identified. Bridging endplate osteophytes in the lower thoracic spine.       Impression:  1.  Moderate bilateral pleural effusions and cardiomegaly. Dependent opacities in both lung bases are most likely atelectatic but nonspecific.  2.  No definite acute findings in the abdomen or pelvis.  3.  Metallic clip again seen in the rectum, possibly from previous biopsy.  4.  There is mild fecal stasis.  This report was signed and finalized on 6/27/2024 9:06 AM by Dr. Steven French MD.       LABORATORY/CULTURE  RESULTS:  Results from last 7 days   Lab Units 06/30/24  0638 06/29/24  0850 06/28/24 2009   WBC 10*3/mm3 6.79 7.68 10.18   HEMOGLOBIN g/dL 10.6* 10.1* 10.5*   HEMATOCRIT % 34.1* 32.5* 33.5*   PLATELETS 10*3/mm3 238 215 252     Results from last 7 days   Lab Units 07/02/24  0542 07/01/24  0659 06/30/24 0638 06/29/24 0850 06/28/24 2009 06/27/24 0612   SODIUM mmol/L 143 139 140   < > 140 140   POTASSIUM mmol/L 3.2* 3.5 3.9   < > 4.2 4.3   CHLORIDE mmol/L 101 99 103   < > 104 106   CO2 mmol/L 28.0 28.0 26.0   < > 23.0 22.0   BUN mg/dL 25* 32* 35*   < > 32* 31*   CREATININE mg/dL 1.57* 1.68* 1.86*   < > 2.01* 1.86*   CALCIUM mg/dL 8.6 8.8 9.1   < > 9.1 9.0   BILIRUBIN mg/dL  --   --   --   --  0.3 0.4   ALK PHOS U/L  --   --   --   --  108 108   ALT (SGPT) U/L  --   --   --   --  9 10   AST (SGOT) U/L  --   --   --   --  14 12   GLUCOSE mg/dL 91 151* 230*   < > 177* 394*    < > = values in this interval not displayed.     Results from last 7 days   Lab Units 06/27/24  0612   INR  0.98     Microbiology Results (last 10 days)       Procedure Component Value - Date/Time    Urine Culture - Urine, Urine, Clean Catch [[id number]]  (Abnormal)  (Susceptibility) Collected: 06/27/24 0638    Lab Status: Final result Specimen: Urine, Clean Catch Updated: 06/30/24 1214     Urine Culture >100,000 CFU/mL Escherichia coli ESBL     Comment:   Consider infectious disease consult.  Susceptibility results may not correlate to clinical outcomes.       Narrative:      Colonization of the urinary tract without infection is common. Treatment is discouraged unless the patient is symptomatic, pregnant, or undergoing an invasive urologic procedure.  Recent outcomes data supports the use of pip/tazo in the treatment of susceptible ESBL infections for uncomplicated UTI. Consider use of pip/tazo as a carbapenem-sparing regimen in applicable patients.    Susceptibility        Escherichia coli ESBL      MATT      Amikacin Susceptible      Ertapenem  Susceptible      Gentamicin Resistant      Levofloxacin Susceptible      Meropenem Susceptible      Nitrofurantoin Susceptible      Piperacillin + Tazobactam Susceptible      Tobramycin Resistant      Trimethoprim + Sulfamethoxazole Resistant                                   PATHOLOGY RESULTS:       ASSESSMENT/PLAN   Diabetic foot ulcerations to left foot  Type 2 diabetes mellitus with hyperglycemia  Diabetic polyneuropathy      Continue wound care twice daily with Betadine soaked gauze wet-to-dry to be changed by nursing.    Patient to follow up with Dr. Gomes at [facility] upon discharge.     Will continue to follow patient while admitted.       This document has been electronically signed by SUSAN Perez on July 2, 2024 16:08 CDT

## 2024-07-02 NOTE — PROGRESS NOTES
Heart Failure Clinic    Date:  07/02/24     Vitals:    07/02/24 1150   BP: 127/69   Pulse: 70   Resp: 11   Temp:    SpO2: 98%        Indication:  Heart Failure    Procedure:  ReDS device sensor unit applied to right side of chest and right side of back.  Appropriate positioning confirmed based off of the unit's calculation.  Chest measurement obtained with the chest size ruler.  Measurement session performed over 45 seconds.      Results:  ReDS Value=41    Interpretation:  39-46% - elevated lung fluid content    Jackie Esteves, RRT 07/02/24 14:07 CDT

## 2024-07-02 NOTE — CASE MANAGEMENT/SOCIAL WORK
Discharge Planning Assessment  [facility]     Patient Name: Erick Luong  MRN: [id number]  Today's Date: 7/2/2024    Admit Date: 6/28/2024        Discharge Needs Assessment       Row Name 07/02/24 1424       Living Environment    People in Home spouse    Name(s) of People in Home Joan-spouse    Current Living Arrangements home    Potentially Unsafe Housing Conditions none    Primary Care Provided by self    Provides Primary Care For no one    Family Caregiver if Needed spouse    Quality of Family Relationships helpful;involved;supportive    Able to Return to Prior Arrangements yes       Transition Planning    Patient/Family Anticipates Transition to home with family    Patient/Family Anticipated Services at Transition none    Transportation Anticipated family or friend will provide       Discharge Needs Assessment    Readmission Within the Last 30 Days no previous admission in last 30 days    Equipment Currently Used at Home none    Concerns to be Addressed no discharge needs identified;denies needs/concerns at this time    Anticipated Changes Related to Illness none    Equipment Needed After Discharge none    Discharge Coordination/Progress Spoke with patient to complete DC planning. Pt plans to return home w/ spouse at DC. Has a PCP and Rx coverage. Denies the need for HH or any DME at this time.  Will follow for DC needs.                   Discharge Plan    No documentation.                 Continued Care and Services - Admitted Since 6/28/2024    No active coordination exists for this encounter.       Selected Continued Care - Episodes Includes continued care and service providers with selected services from the active episodes listed below      Rising Risk Care Management Episode start date: 6/28/2024   There are no active outsourced providers for this episode.                    Demographic Summary    No documentation.                  Functional Status    No documentation.                  Psychosocial     No documentation.                  Abuse/Neglect    No documentation.                  Legal    No documentation.                  Substance Abuse    No documentation.                  Patient Forms    No documentation.                     Clair Hatch RN

## 2024-07-02 NOTE — ANESTHESIA PREPROCEDURE EVALUATION
Anesthesia Evaluation     no history of anesthetic complications:   NPO Solid Status: > 8 hours  NPO Liquid Status: > 4 hours           Airway   Mallampati: III  TM distance: <3 FB  Dental      Pulmonary    (+) ,sleep apnea  (-) asthma, not a smoker  Cardiovascular     (+) hypertension, past MI , CAD, CABG, CHF , PVD, hyperlipidemia  (-) pacemaker, cardiac stents    ROS comment: Echo 04/24:  ·  The study is technically difficult for diagnosis.  If there is concern for possible infective endocarditis, recommend transesophageal echocardiogram to better visualize the valves.  ·  Left ventricular systolic function is normal. Left ventricular ejection fraction appears to be 61 - 65%.  ·  Left ventricular wall thickness is consistent with mild concentric hypertrophy  ·  Left ventricular diastolic function is consistent with (grade III w/high LAP) fixed restrictive pattern.  ·  Mildly reduced right ventricular systolic function noted.  ·  The left atrial cavity is mildly dilated.  ·  There is mild calcification of the aortic valve. No aortic valve regurgitation is present. No hemodynamically significant aortic valve stenosis is present.      Neuro/Psych  (-) seizures, TIA, CVA  GI/Hepatic/Renal/Endo    (+) obesity, GERD, GI bleeding , renal disease- CRI, diabetes mellitus type 2 using insulin  (-) liver disease    Musculoskeletal     Abdominal    Substance History      OB/GYN          Other   arthritis, blood dyscrasia anemia,                   Anesthesia Plan    ASA 3     MAC     intravenous induction     Anesthetic plan, risks, benefits, and alternatives have been provided, discussed and informed consent has been obtained with: patient.    CODE STATUS:    Level Of Support Discussed With: Patient  Code Status (Patient has no pulse and is not breathing): CPR (Attempt to Resuscitate)  Medical Interventions (Patient has pulse or is breathing): Full Support

## 2024-07-02 NOTE — ANESTHESIA POSTPROCEDURE EVALUATION
"Patient: Erick Luong    Procedure Summary       Date: 07/02/24 Room / Location: [facility] ENDOSCOPY 2 / [facility] ENDOSCOPY    Anesthesia Start: 1122 Anesthesia Stop: 1138    Procedure: COLONOSCOPY WITH ANESTHESIA Diagnosis:       Diarrhea, unspecified type      Rectal bleeding      (Diarrhea, unspecified type [R19.7])      (Rectal bleeding [K62.5])    Surgeons: Agapito Christopher MD Provider: Emeka Brady CRNA    Anesthesia Type: MAC ASA Status: 3            Anesthesia Type: MAC    Vitals  Vitals Value Taken Time   /68 07/02/24 1138   Temp     Pulse 69 07/02/24 1139   Resp 13 07/02/24 1138   SpO2 100 % 07/02/24 1139   Vitals shown include unfiled device data.        Post Anesthesia Care and Evaluation    Patient location during evaluation: PHASE II  Patient participation: complete - patient participated  Level of consciousness: awake and alert  Pain management: adequate    Airway patency: patent  Anesthetic complications: No anesthetic complications    Cardiovascular status: acceptable  Respiratory status: acceptable  Hydration status: acceptable    Comments: Blood pressure 124/68, pulse 67, temperature 97.4 °F (36.3 °C), temperature source Oral, resp. rate 13, height 182.9 cm (72\"), weight 129 kg (284 lb), SpO2 100%.    Pt discharged from PACU based on varsha score >8    "

## 2024-07-03 ENCOUNTER — READMISSION MANAGEMENT (OUTPATIENT)
Dept: CALL CENTER | Facility: HOSPITAL | Age: 69
End: 2024-07-03
Payer: COMMERCIAL

## 2024-07-03 VITALS
OXYGEN SATURATION: 98 % | BODY MASS INDEX: 38.47 KG/M2 | WEIGHT: 284 LBS | TEMPERATURE: 97.5 F | RESPIRATION RATE: 16 BRPM | DIASTOLIC BLOOD PRESSURE: 66 MMHG | SYSTOLIC BLOOD PRESSURE: 118 MMHG | HEART RATE: 69 BPM | HEIGHT: 72 IN

## 2024-07-03 PROBLEM — R19.7 DIARRHEA: Status: RESOLVED | Noted: 2024-05-29 | Resolved: 2024-07-03

## 2024-07-03 PROBLEM — I50.33 ACUTE ON CHRONIC HEART FAILURE WITH PRESERVED EJECTION FRACTION (HFPEF): Status: ACTIVE | Noted: 2024-07-03

## 2024-07-03 PROBLEM — Z79.4 TYPE 2 DIABETES MELLITUS WITH FOOT ULCER, WITH LONG-TERM CURRENT USE OF INSULIN: Status: ACTIVE | Noted: 2020-08-31

## 2024-07-03 LAB
ANION GAP SERPL CALCULATED.3IONS-SCNC: 10 MMOL/L (ref 5–15)
BUN SERPL-MCNC: 22 MG/DL (ref 8–23)
BUN/CREAT SERPL: 14.3 (ref 7–25)
CALCIUM SPEC-SCNC: 8.8 MG/DL (ref 8.6–10.5)
CHLORIDE SERPL-SCNC: 104 MMOL/L (ref 98–107)
CO2 SERPL-SCNC: 29 MMOL/L (ref 22–29)
CREAT SERPL-MCNC: 1.54 MG/DL (ref 0.76–1.27)
EGFRCR SERPLBLD CKD-EPI 2021: 48.8 ML/MIN/1.73
GLUCOSE BLDC GLUCOMTR-MCNC: 116 MG/DL (ref 70–130)
GLUCOSE BLDC GLUCOMTR-MCNC: 143 MG/DL (ref 70–130)
GLUCOSE SERPL-MCNC: 115 MG/DL (ref 65–99)
POTASSIUM SERPL-SCNC: 3.5 MMOL/L (ref 3.5–5.2)
SODIUM SERPL-SCNC: 143 MMOL/L (ref 136–145)

## 2024-07-03 PROCEDURE — 82948 REAGENT STRIP/BLOOD GLUCOSE: CPT

## 2024-07-03 PROCEDURE — G0378 HOSPITAL OBSERVATION PER HR: HCPCS

## 2024-07-03 PROCEDURE — 80048 BASIC METABOLIC PNL TOTAL CA: CPT | Performed by: FAMILY MEDICINE

## 2024-07-03 PROCEDURE — 25010000002 ERTAPENEM PER 500 MG: Performed by: FAMILY MEDICINE

## 2024-07-03 RX ORDER — BUMETANIDE 1 MG/1
1 TABLET ORAL 2 TIMES DAILY
Qty: 60 TABLET | Refills: 0 | Status: SHIPPED | OUTPATIENT
Start: 2024-07-03 | End: 2024-08-02

## 2024-07-03 RX ORDER — POTASSIUM CHLORIDE 750 MG/1
40 CAPSULE, EXTENDED RELEASE ORAL ONCE
Status: COMPLETED | OUTPATIENT
Start: 2024-07-03 | End: 2024-07-03

## 2024-07-03 RX ADMIN — ERTAPENEM 1000 MG: 1 INJECTION INTRAMUSCULAR; INTRAVENOUS at 12:50

## 2024-07-03 RX ADMIN — ROSUVASTATIN CALCIUM 10 MG: 20 TABLET, FILM COATED ORAL at 08:20

## 2024-07-03 RX ADMIN — POTASSIUM CHLORIDE 40 MEQ: 750 CAPSULE, EXTENDED RELEASE ORAL at 10:28

## 2024-07-03 RX ADMIN — CETIRIZINE HYDROCHLORIDE 10 MG: 10 TABLET ORAL at 08:20

## 2024-07-03 RX ADMIN — TIMOLOL MALEATE 1 DROP: 5 SOLUTION/ DROPS OPHTHALMIC at 08:21

## 2024-07-03 RX ADMIN — CARVEDILOL 3.12 MG: 3.12 TABLET, FILM COATED ORAL at 08:20

## 2024-07-03 RX ADMIN — BUMETANIDE 1 MG: 1 TABLET ORAL at 08:20

## 2024-07-03 RX ADMIN — DULOXETINE HYDROCHLORIDE 60 MG: 30 CAPSULE, DELAYED RELEASE ORAL at 08:19

## 2024-07-03 RX ADMIN — TAMSULOSIN HYDROCHLORIDE 0.4 MG: 0.4 CAPSULE ORAL at 08:20

## 2024-07-03 RX ADMIN — PREGABALIN 50 MG: 50 CAPSULE ORAL at 08:19

## 2024-07-03 RX ADMIN — HYDROCODONE BITARTRATE AND ACETAMINOPHEN 1 TABLET: 10; 325 TABLET ORAL at 03:28

## 2024-07-03 RX ADMIN — ISOSORBIDE MONONITRATE 30 MG: 30 TABLET, EXTENDED RELEASE ORAL at 08:20

## 2024-07-03 RX ADMIN — FLUTICASONE PROPIONATE 1 SPRAY: 50 SPRAY, METERED NASAL at 08:21

## 2024-07-03 RX ADMIN — PANTOPRAZOLE SODIUM 40 MG: 40 TABLET, DELAYED RELEASE ORAL at 08:19

## 2024-07-03 RX ADMIN — CALCITRIOL 0.5 MCG: 0.25 CAPSULE ORAL at 08:20

## 2024-07-03 RX ADMIN — Medication 10 ML: at 08:21

## 2024-07-03 RX ADMIN — BUSPIRONE HYDROCHLORIDE 10 MG: 10 TABLET ORAL at 08:20

## 2024-07-03 NOTE — DISCHARGE SUMMARY
[facility] Medicine Services  DISCHARGE SUMMARY       Date of Admission: 6/28/2024  Date of Discharge:  7/3/2024  Primary Care Physician: Del Shetty MD    Presenting Problem/History of Present Illness:  69-year-old male with history of diabetes mellitus type 2, atrial fibrillation, coronary artery disease, prior diabetic foot ulcer, diabetic polyneuropathy, hypertension, hyperlipidemia, osteomyelitis of the foot, insomnia, chronic kidney disease stage, obstructive sleep apnea, poor compliance with CPAP, cervical spine stenosis, vitamin D deficiency, admitted last night with multiple complaints.  Chest pain, abdominal pain, shortness of breath at rest, dysuria, very poor historian.    Final Discharge Diagnoses:  Active Hospital Problems    Diagnosis     **Acute on chronic heart failure with preserved ejection fraction (HFpEF)     CHF exacerbation     Slow transit constipation     Type 2 diabetes mellitus with foot ulcer, with long-term current use of insulin     Sleep apnea with use of continuous positive airway pressure (CPAP)     Anemia due to chronic kidney disease        Consults: Gastroenterology    Procedures Performed: Colonoscopy    Pertinent Test Results:   Results for orders placed during the hospital encounter of 03/26/24    Adult Transthoracic Echo Complete W/ Cont if Necessary Per Protocol    Interpretation Summary    The study is technically difficult for diagnosis.  If there is concern for possible infective endocarditis, recommend transesophageal echocardiogram to better visualize the valves.    Left ventricular systolic function is normal. Left ventricular ejection fraction appears to be 61 - 65%.    Left ventricular wall thickness is consistent with mild concentric hypertrophy    Left ventricular diastolic function is consistent with (grade III w/high LAP) fixed restrictive pattern.    Mildly reduced right ventricular systolic function noted.    The left  atrial cavity is mildly dilated.    There is mild calcification of the aortic valve. No aortic valve regurgitation is present. No hemodynamically significant aortic valve stenosis is present.      Imaging Results (All)       Procedure Component Value Units Date/Time    XR Chest 1 View [[id number]] Collected: 06/28/24 2115     Updated: 06/28/24 2119    Narrative:      EXAM: XR CHEST 1 VW- 6/28/2024 7:03 PM     HISTORY: SOA Triage Protocol       COMPARISON: 5/3/2024.     TECHNIQUE: Single frontal radiograph of the chest was obtained.     FINDINGS:     Support Devices: Prior median sternotomy.     Cardiac and Mediastinal Silhouettes: Normal.     Lungs/Pleura: Mild interstitial prominence. Suspected small right  pleural effusion. No visible pneumothorax.     Osseous structures: No acute osseous finding.     Other: None.       Impression:         Suspected small left pleural effusion.     Mild interstitial prominence may suggest mild edema.           This report was signed and finalized on 6/28/2024 9:16 PM by Ronal Wisdom.             LAB RESULTS:      Lab 06/30/24  0638 06/29/24  0850 06/28/24 2009 06/27/24 0612   WBC 6.79 7.68 10.18 8.28   HEMOGLOBIN 10.6* 10.1* 10.5* 10.3*   HEMATOCRIT 34.1* 32.5* 33.5* 32.5*   PLATELETS 238 215 252 215   NEUTROS ABS 3.94 5.65 7.28* 5.62   IMMATURE GRANS (ABS) 0.03 0.04 0.04 0.06*   LYMPHS ABS 1.56 1.08 1.55 1.25   MONOS ABS 0.77 0.59 0.88 0.85   EOS ABS 0.44* 0.28 0.38 0.45*   MCV 87.0 86.7 86.6 87.4   PROTIME  --   --   --  13.3         Lab 07/03/24  0812 07/02/24  1631 07/02/24  0542 07/01/24  0659 06/30/24  0638 06/29/24  0850 06/28/24 2009 06/27/24 0612   SODIUM 143  --  143 139 140 139   < > 140   POTASSIUM 3.5 3.6 3.2* 3.5 3.9 4.1   < > 4.3   CHLORIDE 104  --  101 99 103 103   < > 106   CO2 29.0  --  28.0 28.0 26.0 22.0   < > 22.0   ANION GAP 10.0  --  14.0 12.0 11.0 14.0   < > 12.0   BUN 22  --  25* 32* 35* 34*   < > 31*   CREATININE 1.54*  --  1.57* 1.68* 1.86* 1.88*    < > 1.86*   EGFR 48.8*  --  47.7* 44.0* 38.9* 38.4*   < > 38.9*   GLUCOSE 115*  --  91 151* 230* 334*   < > 394*   CALCIUM 8.8  --  8.6 8.8 9.1 9.1   < > 9.0   MAGNESIUM  --   --   --   --  2.0  --   --  2.2   PHOSPHORUS  --   --   --   --  4.6*  --   --   --     < > = values in this interval not displayed.         Lab 06/28/24 2009 06/27/24  0612   TOTAL PROTEIN 6.6 6.4   ALBUMIN 3.8 3.6   GLOBULIN 2.8 2.8   ALT (SGPT) 9 10   AST (SGOT) 14 12   BILIRUBIN 0.3 0.4   ALK PHOS 108 108   LIPASE  --  36         Lab 06/28/24 2218 06/28/24 2009 06/27/24 0612   PROBNP  --  3,206.0*  --    HSTROP T 47* 51*  --    PROTIME  --   --  13.3   INR  --   --  0.98                 Brief Urine Lab Results  (Last result in the past 365 days)        Color   Clarity   Blood   Leuk Est   Nitrite   Protein   CREAT   Urine HCG        06/29/24 1845             17.0               Microbiology Results (last 10 days)       Procedure Component Value - Date/Time    Urine Culture - Urine, Urine, Clean Catch [[id number]]  (Abnormal)  (Susceptibility) Collected: 06/27/24 0638    Lab Status: Final result Specimen: Urine, Clean Catch Updated: 06/30/24 1214     Urine Culture >100,000 CFU/mL Escherichia coli ESBL     Comment:   Consider infectious disease consult.  Susceptibility results may not correlate to clinical outcomes.       Narrative:      Colonization of the urinary tract without infection is common. Treatment is discouraged unless the patient is symptomatic, pregnant, or undergoing an invasive urologic procedure.  Recent outcomes data supports the use of pip/tazo in the treatment of susceptible ESBL infections for uncomplicated UTI. Consider use of pip/tazo as a carbapenem-sparing regimen in applicable patients.    Susceptibility        Escherichia coli ESBL      MATT      Amikacin Susceptible      Ertapenem Susceptible      Gentamicin Resistant      Levofloxacin Susceptible      Meropenem Susceptible      Nitrofurantoin Susceptible       Piperacillin + Tazobactam Susceptible      Tobramycin Resistant      Trimethoprim + Sulfamethoxazole Resistant                                   Hospital Course: He was admitted with diagnosis of acute on chronic heart failure, he was managed with diuretics, Bumex 1 mg IV every 12 hours.  He continued beta-blocker; due to low blood pressure valsartan was placed on hold.  Patient had adequate progress regarding acute exacerbation symptomatology. He had diuretics changed to the oral route.     Regarding episodes of rectal bleeding, patient reported that his anticoagulation was discontinued prior admission due to bleeding but he has not had any more episodes since then.  On prior admission, April 07, 2024, colonoscopy was performed and patient had hemo clipping of bleeding lesion and submucosal injection therapy of a bleeding lesion.  Again at that time he had poor bowel preparation.  On April 11, 2024, upper endoscopy showed no active bleeding.      The patient had been scheduled for a colonoscopy on July 1, 2024, elective, and given that he was now admitted to the hospital, gastroenterology evaluation was requested.  For this reason, anticoagulants were not restarted.  Gastroenterology evaluation took place.  Initial colonoscopy showed inadequate preparation on 7/1/2024; patient had again bowel preparation and scheduled for procedure; colonoscopy report, from 7/2/2024: poor preparation of the colon; aborted due to inadequate bowel preparation,.  Passed to 90 cm.  The examined colon was normal.  I could not see documentation or additional recommendations on continuing or discontinuing anticoagulation.  For this reason patient to follow-up with cardiologist and primary care provider for decision on restarting his long-term anticoagulation medication.  His hemoglobin on discharge was above 10.    Urine culture from 6/27/2024, E. Coli ESBL, confirmed (collected in the emergency department during ER visit).  For this  "reason, the patient was managed with ertapenem, which was administered for 5 doses.  He did not have any fevers during the hospital stay.    Patient also complaining of constipation for approximately 2 weeks, which improved obviously with bowel preparation.    His initial creatinine on admission was 2.01, decreased to 1.5 on discharge.  Glucose was controlled, managed with basal bolus regimen.  Patient with history of foot diabetic ulcer, for which she had wound care during this hospital stay.  Recommended wound care clinic follow-up in the outpatient setting on discharge.  The patient declined home health.    Patient reached maximal medical benefit of hospital admission and was discharged home.  See medication reconciliation on discharge.  Patient to follow primary care provider, gastroenterologist, and cardiologist in the outpatient setting.      Physical Exam on Discharge:  /56 (BP Location: Right arm, Patient Position: Lying)   Pulse 73   Temp 97.6 °F (36.4 °C) (Oral)   Resp 16   Ht 182.9 cm (72\")   Wt 129 kg (284 lb)   SpO2 97%   BMI 38.52 kg/m²   Physical Exam  Constitutional:       Appearance: Sitting in chair.  No respiratory distress.  No tachypnea.  Alert, oriented x 3.  HENT:      Head: Normocephalic and atraumatic.      Nose: Nose normal.      Mouth/Throat:      Mouth: Mucous membranes are moist.      Pharynx: Oropharynx is clear.   Eyes:      Extraocular Movements: Extraocular movements intact.      Conjunctiva/sclera: Conjunctivae normal.      Pupils: Pupils are equal, round, and reactive to light.   Cardiovascular:      Rate and Rhythm: irregular rhythm.      Pulses: Normal pulses.   Pulmonary:      Effort: No respiratory distress.      Breath sounds: Decreased breath sounds in both bases. No wheezing  Abdominal:      General: Abdomen is obese.  Bowel sounds are normal.      Palpations: Abdomen is soft.      Tenderness: There is no guarding or rebound.   Musculoskeletal:         General: " Normal range of motion.      Cervical back: Normal range of motion and neck supple.   Extremities: Edema lower extremities 1-2+.  Pitting; foot covered with dressings and Kerlix wrap.  Skin:     Capillary Refill: Capillary refill takes less than 2 seconds.      Coloration: Skin is not jaundiced.      Findings: No rash.   Neurological:      General: No focal deficit present.      Mental Status: Patient is alert, oriented to place time and person.     Sensory: No sensory deficit.      Motor: No weakness.      Coordination: Coordination normal.   Psychiatric:         Mood and Affect: Mood normal.         Behavior: Behavior normal.     Condition on Discharge: Stable    Discharge Disposition:  Home or Self Care    Discharge Medications:     Discharge Medications        Changes to Medications        Instructions Start Date   bumetanide 1 MG tablet  Commonly known as: BUMEX  What changed:   medication strength  how much to take  when to take this   1 mg, Oral, 2 Times Daily      pregabalin 100 MG capsule  Commonly known as: LYRICA  What changed: Another medication with the same name was removed. Continue taking this medication, and follow the directions you see here.   200 mg, Oral, Nightly             Continue These Medications        Instructions Start Date   ascorbic acid 1000 MG tablet  Commonly known as: VITAMIN C   1,000 mg, Oral, Daily      busPIRone 10 MG tablet  Commonly known as: BUSPAR   10 mg, Oral, 2 Times Daily      calcitriol 0.5 MCG capsule  Commonly known as: ROCALTROL   0.5 mcg, Oral, Daily      carvedilol 3.125 MG tablet  Commonly known as: COREG   Take 1 tablet twice a day by oral route.      Diclofenac Sodium 1 % gel gel  Commonly known as: VOLTAREN   2 g, Topical, 4 Times Daily PRN      donepezil 10 MG tablet  Commonly known as: ARICEPT   10 mg, Oral, Daily      DULoxetine 60 MG capsule  Commonly known as: CYMBALTA   60 mg, Oral, Daily      Enulose 10 GM/15ML solution solution  (encephalopathy)  Generic drug: lactulose   30 mL, Oral, 3 times daily      famotidine 20 MG tablet  Commonly known as: PEPCID   Take 1 tablet twice a day by oral route.      fluticasone 50 MCG/ACT nasal spray  Commonly known as: FLONASE   Instill 1 spray in each nostril as directed by provider Daily.      Insulin Regular Human (Conc) 500 UNIT/ML solution pen-injector CONCENTRATED injection  Commonly known as: HumuLIN R   15 Units, Subcutaneous, 3 Times Daily Before Meals      Insulin Regular Human (Conc) 500 UNIT/ML solution pen-injector CONCENTRATED injection  Commonly known as: HumuLIN R   40 Units, Subcutaneous, 3 Times Daily Before Meals, Inject 40 units under the skin in the the appropriate area with regular meals AND 40 units with large meals.       Lantus SoloStar 100 UNIT/ML injection pen  Generic drug: Insulin Glargine   20 Units, Subcutaneous, Every Night at Bedtime      levocetirizine 5 MG tablet  Commonly known as: XYZAL   Take 1 tablet by mouth every day at bedtime.      melatonin 3 MG tablet   6 mg, Oral, Nightly PRN      nitroglycerin 0.4 MG SL tablet  Commonly known as: NITROSTAT   0.4 mg, Sublingual, Every 5 Minutes PRN, Take no more than 3 doses in 15 minutes.      oxyCODONE 10 MG tablet  Commonly known as: ROXICODONE   Take 1 tablet by mouth 2 (Two) Times a Day As Needed---MUST last 30 days      pantoprazole 40 MG EC tablet  Commonly known as: PROTONIX   40 mg, Oral, Every 12 Hours Scheduled      polyethylene glycol 17 GM/SCOOP powder  Commonly known as: MIRALAX   17 g, Oral, Daily PRN      rosuvastatin 10 MG tablet  Commonly known as: CRESTOR   10 mg, Oral, Nightly      sennosides-docusate 8.6-50 MG per tablet  Commonly known as: PERICOLACE   1 tablet, Oral, Nightly, Obtain OTC      sodium hypochlorite 0.125 % solution topical solution 0.125%  Commonly known as: DAKIN'S 1/4 STRENGTH   Apply 1 application topically to the appropriate area as directed 2 (Two) Times a Day.      sucralfate 1 g  tablet  Commonly known as: CARAFATE   Take 1 tablet by mouth 4 (Four) Times a Day before meals.      tamsulosin 0.4 MG capsule 24 hr capsule  Commonly known as: FLOMAX   0.4 mg, Oral, Daily      Vitron-C  MG tablet  Generic drug: Iron-Vitamin C   Take 1 tablet twice a day by oral route.             Stop These Medications      levoFLOXacin 750 MG tablet  Commonly known as: LEVAQUIN     midodrine 10 MG tablet  Commonly known as: PROAMATINE     valsartan 40 MG tablet  Commonly known as: DIOVAN     zinc sulfate 50 MG capsule  Commonly known as: ZINCATE                Discharge Diet:   Diet Instructions       Diet: Diabetic Diets, Cardiac Diets; Healthy Heart (2-3 Na+); Regular (IDDSI 7); Thin (IDDSI 0); Consistent Carbohydrate      Discharge Diet:  Diabetic Diets  Cardiac Diets       Cardiac Diet: Healthy Heart (2-3 Na+)    Texture: Regular (IDDSI 7)    Fluid Consistency: Thin (IDDSI 0)    Diabetic Diet: Consistent Carbohydrate            Activity at Discharge: AsTolerated    Follow-up Appointments:   Future Appointments   Date Time Provider Department Center   8/5/2024  9:00 AM Emeka Garay MD [facility]       Test Results Pending at Discharge: None    Electronically signed by Josué De Oliveira MD, 07/03/24, 09:43 CDT.    Time: 50 minutes.

## 2024-07-03 NOTE — PLAN OF CARE
Problem: Adult Inpatient Plan of Care  Goal: Plan of Care Review  Outcome: Ongoing, Progressing  Flowsheets (Taken 7/3/2024 4797)  Progress: no change  Plan of Care Reviewed With: patient  Outcome Evaluation: Pt A&Ox4, VSS. C/o generalized neck pain with some relief from PRN meds. Up with stand by assist, voiding. Pt up in chair most of shift, moved to bed this AM. Room air, CPOX. Afib on telemetry. Dressing to L foot CDI. Safety maintained, will continue to monitor.

## 2024-07-03 NOTE — PROGRESS NOTES
Nephrology ([facility]) Progress Note      Patient:  Erick Luong  YOB: 1955  Date of Service: 7/3/2024  MRN: [id number]   Acct: [id number]   Primary Care Physician: Del Shetty MD  Advance Directive:   Code Status and Medical Interventions:   Ordered at: 06/30/24 1119     Level Of Support Discussed With:    Patient     Code Status (Patient has no pulse and is not breathing):    CPR (Attempt to Resuscitate)     Medical Interventions (Patient has pulse or is breathing):    Full Support     Admit Date: 6/28/2024       Hospital Day: 1  Referring Provider: Latanya Paez MD      Patient personally seen and examined.  Complete chart including Consults, Notes, Operative Reports, Labs, Cardiology, and Radiology studies reviewed as able.        Subjective:  Erick Luong is a 69 y.o. male for whom we were consulted for evaluation and treatment of chronic kidney disease stage IIIb.  He has stage IIIb chronic kidney disease baseline and follows with Dr. Lomas in the office as he has diabetic nephropathy.  He has a history of insulin-dependent type 2 diabetes, hypertension, abdominal obesity, obstructive sleep apnea, diabetic foot ulcer, Vitamin D Deficiency and coronary artery disease.  He had multiple complaints over the last week including no bowel movement x 2 weeks, several falls at home, cystitis, GI bleed with planned colonoscopy coming up on Monday and increasing peripheral edema.  Also had some diffuse abdominal pain from the falls.  Denied hematuria, dysuria, hemoptysis, rash.  Did have bruising and scratches from the falls.  Seen with family for initial evaluation.     He is s/p colonoscopy on 7/1.  His repeat serum creatinine is 1.5.  He was anxious for discharge. His diet has been advanced and is well tolerated.     Allergies:  Cefepime, Bactrim [sulfamethoxazole-trimethoprim], Vancomycin, Zolpidem, and Metronidazole    Home Meds:  Medications Prior to Admission    Medication Sig Dispense Refill Last Dose    ascorbic acid (VITAMIN C) 1000 MG tablet Take 1 tablet by mouth Daily. 30 tablet 3 Past Week    bumetanide (BUMEX) 2 MG tablet Take 1 tablet by mouth Daily. 90 tablet 0 Past Week    busPIRone (BUSPAR) 10 MG tablet Take 1 tablet by mouth 2 (Two) Times a Day.   Past Week    calcitriol (ROCALTROL) 0.5 MCG capsule Take 1 capsule by mouth Daily. 90 capsule 4 Past Week    carvedilol (COREG) 3.125 MG tablet Take 1 tablet twice a day by oral route. 60 tablet 4 Past Week    Diclofenac Sodium (VOLTAREN) 1 % gel gel Apply 2 g topically to the appropriate area as directed 4 (Four) Times a Day As Needed. 300 g 11 Past Month    donepezil (ARICEPT) 10 MG tablet Take 1 tablet by mouth Daily. 90 tablet 1 Past Week    DULoxetine (CYMBALTA) 60 MG capsule Take 1 capsule by mouth Daily. 90 capsule 4 Past Week    famotidine (PEPCID) 20 MG tablet Take 1 tablet twice a day by oral route. 180 tablet 4 Past Week    fluticasone (FLONASE) 50 MCG/ACT nasal spray Instill 1 spray in each nostril as directed by provider Daily. 16 g 1 Past Week    Insulin Glargine (Lantus SoloStar) 100 UNIT/ML injection pen Inject 20 Units under the skin into the appropriate area as directed every night at bedtime. 15 mL 3 Past Week    Insulin Regular Human, Conc, (HumuLIN R) 500 UNIT/ML solution pen-injector CONCENTRATED injection Inject 15 Units under the skin into the appropriate area as directed 3 (Three) Times a Day Before Meals.   Past Week    Insulin Regular Human, Conc, (HumuLIN R) 500 UNIT/ML solution pen-injector CONCENTRATED injection Inject 40 Units under the skin into the appropriate area as directed 3 (Three) Times a Day Before Meals. Inject 40 units under the skin in the the appropriate area with regular meals AND 40 units with large meals.   Past Week    Iron-Vitamin C (Vitron-C)  MG tablet Take 1 tablet twice a day by oral route. 60 tablet 2 Past Week    lactulose (Enulose) 10 GM/15ML solution  solution (encephalopathy) Take 30 mL by mouth 3 times a day for 30 days. 2838 mL 11 Past Week    levocetirizine (XYZAL) 5 MG tablet Take 1 tablet by mouth every day at bedtime. 30 tablet 2 Past Week    levoFLOXacin (LEVAQUIN) 750 MG tablet Take 1 tablet by mouth Daily. 7 tablet 0 Past Week    melatonin 3 MG tablet Take 2 tablets by mouth At Night As Needed for Sleep. 30 tablet 0 Past Week    midodrine (PROAMATINE) 10 MG tablet Take 1 tablet by mouth 3 (Three) Times a Day. 270 tablet 4 Past Week    oxyCODONE (ROXICODONE) 10 MG tablet Take 1 tablet by mouth 2 (Two) Times a Day As Needed---MUST last 30 days 55 tablet 0 Past Week    pantoprazole (PROTONIX) 40 MG EC tablet Take 1 tablet by mouth Every 12 (Twelve) Hours. 180 tablet 4 Past Week    polyethylene glycol (MIRALAX) 17 GM/SCOOP powder Take 17 g by mouth Daily As Needed (constipation).   Past Week    pregabalin (LYRICA) 100 MG capsule Take 2 capsules by mouth Every Night.   Past Week    pregabalin (LYRICA) 50 MG capsule Take 1 capsule by mouth Daily.   Past Week    rosuvastatin (CRESTOR) 10 MG tablet Take 1 tablet by mouth Every Night. 30 tablet 2 Past Week    sennosides-docusate (PERICOLACE) 8.6-50 MG per tablet Take 1 tablet by mouth Every Night. Obtain OTC   Past Week    sodium hypochlorite (DAKIN'S 1/4 STRENGTH) 0.125 % solution topical solution 0.125% Apply 1 application topically to the appropriate area as directed 2 (Two) Times a Day. 473 mL 5 Past Week    sucralfate (CARAFATE) 1 g tablet Take 1 tablet by mouth 4 (Four) Times a Day before meals. 360 tablet 1 Past Week    tamsulosin (FLOMAX) 0.4 MG capsule 24 hr capsule Take 1 capsule by mouth Daily. 90 capsule 1 Past Week    valsartan (DIOVAN) 40 MG tablet Take 1 tablet by mouth Every Night. 30 tablet 2 Past Week    zinc sulfate (ZINCATE) 50 MG capsule Take 1 capsule by mouth Daily.   Past Week    nitroglycerin (NITROSTAT) 0.4 MG SL tablet Place 1 tablet under the tongue Every 5 (Five) Minutes As Needed  for Chest Pain. Take no more than 3 doses in 15 minutes.   Unknown       Medicines:  Current Facility-Administered Medications   Medication Dose Route Frequency Provider Last Rate Last Admin    acetaminophen (TYLENOL) tablet 650 mg  650 mg Oral Q4H PRN Latanya Paez MD        Or    acetaminophen (TYLENOL) 160 MG/5ML oral solution 650 mg  650 mg Oral Q4H PRN Latanya Paez MD        Or    acetaminophen (TYLENOL) suppository 650 mg  650 mg Rectal Q4H PRN Latanya Paez MD        sennosides-docusate (PERICOLACE) 8.6-50 MG per tablet 2 tablet  2 tablet Oral BID PRN Latanya Paez MD        And    polyethylene glycol (MIRALAX) packet 17 g  17 g Oral Daily PRN Latanya Paez MD   17 g at 06/29/24 1305    And    bisacodyl (DULCOLAX) EC tablet 5 mg  5 mg Oral Daily PRN Latanya Paez MD   5 mg at 06/29/24 1304    And    bisacodyl (DULCOLAX) suppository 10 mg  10 mg Rectal Daily PRN Latanya Paez MD   10 mg at 06/29/24 1840    bumetanide (BUMEX) tablet 1 mg  1 mg Oral BID Josué De Oliveira MD   1 mg at 07/03/24 0820    busPIRone (BUSPAR) tablet 10 mg  10 mg Oral BID Josué De Oliveira MD   10 mg at 07/03/24 0820    calcitriol (ROCALTROL) capsule 0.5 mcg  0.5 mcg Oral Daily Josué De Oliveira MD   0.5 mcg at 07/03/24 0820    Calcium Replacement - Follow Nurse / BPA Driven Protocol   Does not apply PRN Latanya Paez MD        carvedilol (COREG) tablet 3.125 mg  3.125 mg Oral BID With Meals Donna Roman APRN   3.125 mg at 07/03/24 0820    cetirizine (zyrTEC) tablet 10 mg  10 mg Oral Daily Josué De Oliveira MD   10 mg at 07/03/24 0820    dextrose (D50W) (25 g/50 mL) IV injection 25 g  25 g Intravenous Q15 Min PRN Latanya Paez MD        dextrose (GLUTOSE) oral gel 15 g  15 g Oral Q15 Min PRN Latanya Paez MD        DULoxetine (CYMBALTA) DR capsule 60 mg  60 mg Oral Daily Josué De Oliveira MD   60 mg at 07/03/24 0819    ertapenem (INVanz) 1,000 mg in sodium chloride 0.9 % 100 mL MBP  1,000 mg Intravenous  Q24H Josué De Oliveira  mL/hr at 07/02/24 1222 1,000 mg at 07/02/24 1222    fluticasone (FLONASE) 50 MCG/ACT nasal spray 1 spray  1 spray Nasal Daily Josué De Oliveira MD   1 spray at 07/03/24 0821    glucagon (GLUCAGEN) injection 1 mg  1 mg Intramuscular Q15 Min PRN Latanya Paez MD        HYDROcodone-acetaminophen (NORCO)  MG per tablet 1 tablet  1 tablet Oral Q6H PRN Latanya Paez MD   1 tablet at 07/03/24 0328    insulin glargine (LANTUS, SEMGLEE) injection 23 Units  23 Units Subcutaneous Nightly Josué De Oliveira MD   23 Units at 07/02/24 2040    Insulin Lispro (humaLOG) injection 2-7 Units  2-7 Units Subcutaneous 4x Daily AC & at Bedtime Latanya Paez MD   3 Units at 07/01/24 2028    isosorbide mononitrate (IMDUR) 24 hr tablet 30 mg  30 mg Oral Daily Josué De Oliveira MD   30 mg at 07/03/24 0820    Magnesium Standard Dose Replacement - Follow Nurse / BPA Driven Protocol   Does not apply PRN Latanya Paez MD        nitroglycerin (NITROSTAT) SL tablet 0.4 mg  0.4 mg Sublingual Q5 Min PRN Latanya Paez MD        ondansetron (ZOFRAN) injection 4 mg  4 mg Intravenous Q6H PRN Latanya Paez MD   4 mg at 06/29/24 1714    oxyCODONE (ROXICODONE) immediate release tablet 10 mg  10 mg Oral BID PRN Latanya Paez MD   10 mg at 07/02/24 2045    pantoprazole (PROTONIX) EC tablet 40 mg  40 mg Oral QAM AC Josué De Oliveira MD   40 mg at 07/03/24 0819    Phosphorus Replacement - Follow Nurse / BPA Driven Protocol   Does not apply PRN Latanya Paez MD        Potassium Replacement - Follow Nurse / BPA Driven Protocol   Does not apply PRN Latanya Paez MD        pregabalin (LYRICA) capsule 100 mg  100 mg Oral Nightly Latanya Paez MD   100 mg at 07/02/24 2040    pregabalin (LYRICA) capsule 50 mg  50 mg Oral Daily Josué De Oliveira MD   50 mg at 07/03/24 0819    rosuvastatin (CRESTOR) tablet 10 mg  10 mg Oral Daily Josué De Oliveira MD   10 mg at 07/03/24 0820    sodium chloride 0.9 % flush 10  mL  10 mL Intravenous Q12H Latanya Paez MD   10 mL at 07/03/24 0821    sodium chloride 0.9 % flush 10 mL  10 mL Intravenous PRN Latanya Paez MD        sodium chloride 0.9 % infusion 40 mL  40 mL Intravenous PRN Latanya Paez MD        sodium chloride 0.9 % infusion 500 mL  500 mL Intravenous Continuous PRN Lamine Villar CRNA 25 mL/hr at 07/02/24 1122 Currently Infusing at 07/02/24 1122    tamsulosin (FLOMAX) 24 hr capsule 0.4 mg  0.4 mg Oral Daily Josué De Oliveira MD   0.4 mg at 07/03/24 0820    timolol (TIMOPTIC) 0.5 % ophthalmic solution 1 drop  1 drop Both Eyes BID Josué De Oliveira MD   1 drop at 07/03/24 0821       Past Medical History:  Past Medical History:   Diagnosis Date    Arthritis     Autonomic disease     CAD (coronary artery disease) 02/06/2017    Cervical radiculopathy 09/16/2021    Chronic constipation with acute exaccerbation 05/10/2021    Coronary artery disease     Degeneration of cervical intervertebral disc 08/11/2021    Diabetes mellitus     Diabetic foot ulcer 08/31/2020    Diabetic polyneuropathy associated with type 2 diabetes mellitus 01/18/2021    Elevated cholesterol     Gastroesophageal reflux disease 05/13/2019    Headache     HTN (hypertension), benign 05/03/2017    Hyperlipidemia     Hypertension     Mixed hyperlipidemia 02/07/2017    Multiple lung nodules 01/26/2020    5mm, 9 mm RLL identified 1/2020, not present 10/2019.    Myocardial infarction     Osteomyelitis 01/22/2020    Osteomyelitis of fifth toe of right foot 10/07/2019    Pancreatitis     Persistent insomnia 01/20/2020    Renal disorder     Sleep apnea 02/06/2017    Sleep apnea with use of continuous positive airway pressure (CPAP)     NON-COMPLIANT    Slow transit constipation 01/16/2019    Spinal stenosis in cervical region 09/16/2021    Vitamin D deficiency 03/02/2021       Past Surgical History:  Past Surgical History:   Procedure Laterality Date    ABDOMINAL SURGERY      AMPUTATION FOOT / TOE Left  10/2021    5th digit     ANTERIOR CERVICAL DISCECTOMY W/ FUSION N/A 08/05/2022    Procedure: CERVICAL DISCECTOMY ANTERIOR WITH FUSION C5-6 with possible plating of C5-7 with neuromonitoring and 1 c-arm;  Surgeon: Karel Soliz MD;  Location: [facility] OR;  Service: Neurosurgery;  Laterality: N/A;    APPENDECTOMY      BACK SURGERY      CARDIAC CATHETERIZATION Left 02/08/2021    Procedure: Left Heart Cath w poss intervention left anatomical snuff box acess;  Surgeon: Omkar Charles DO;  Location: [facility] CATH INVASIVE LOCATION;  Service: Cardiology;  Laterality: Left;    CARDIAC SURGERY      CATARACT EXTRACTION      CERVICAL SPINE SURGERY      COLONOSCOPY N/A 01/31/2017    Normal exam repeat in 5 years    COLONOSCOPY N/A 02/11/2019    Mild acute inflammation    COLONOSCOPY N/A 04/07/2024    2 areas at 10 and 20 cm with friability ulceration 2 clips placed at 20 cm and 4 clips at 10 cm poor prep normal mucosa,mild eroisions and ulcerations in visible vessels    COLONOSCOPY N/A 7/1/2024    Procedure: COLONOSCOPY WITH ANESTHESIA;  Surgeon: Arsalan Lorenzo DO;  Location: [facility] ENDOSCOPY;  Service: Gastroenterology;  Laterality: N/A;  pre op constipation/diarrhea  post poor prep  pcp Del Shetty    COLONOSCOPY N/A 7/2/2024    Procedure: COLONOSCOPY WITH ANESTHESIA;  Surgeon: Agapito Christopher MD;  Location: [facility] ENDOSCOPY;  Service: Gastroenterology;  Laterality: N/A;  pre rectal bleeding  post poor prep  pcp Del Shetty MD    COLONOSCOPY W/ POLYPECTOMY  03/04/2014    Hyperplastic polyp    CORONARY ARTERY BYPASS GRAFT  10/2015    ENDOSCOPY  04/13/2011    Gastritis with hemorrhage    ENDOSCOPY N/A 05/05/2017    Normal exam    ENDOSCOPY N/A 02/11/2019    Gastritis    ENDOSCOPY N/A 09/01/2020    Non-erosive gastritis with hemorrhage    ENDOSCOPY N/A 02/10/2021    Esophagitis    ENDOSCOPY N/A 04/11/2024    There were esophageal mucosal changes suspicious for short-segment Low's esophagus present  in the distal esophagus. The maximum longitudinal extent of these mucosal changes was 2 cm in length. Mucosa was biopsied with a cold forceps for histologyDistal esophagus, biopsies: Mild chronic active esophagogastritis. No evidence of intestinal metaplasia, dysplasia. Antrum, bx, Mild chronic gastritis    FOOT SURGERY Left     INCISION AND DRAINAGE OF WOUND Left 2007    spider bite       Family History  Family History   Problem Relation Age of Onset    Colon cancer Father     Heart disease Father     Colon cancer Sister     Colon polyps Sister     Alzheimer's disease Mother     Coronary artery disease Sister     Coronary artery disease Sister        Social History  Social History     Socioeconomic History    Marital status:    Tobacco Use    Smoking status: Former     Current packs/day: 0.00     Types: Cigarettes     Quit date:      Years since quittin.5    Smokeless tobacco: Never    Tobacco comments:     smoked in [employer]   Vaping Use    Vaping status: Never Used   Substance and Sexual Activity    Alcohol use: No    Drug use: No    Sexual activity: Defer       Review of Systems:  History obtained from chart review and the patient  General ROS: No fever or chills  Respiratory ROS: No cough, shortness of breath, wheezing  Cardiovascular ROS: No chest pain or palpitations  Gastrointestinal ROS: No abdominal pain or melena  Genito-Urinary ROS: No dysuria or hematuria  Psych ROS: No anxiety and depression  14 point ROS reviewed with the patient and negative except as noted above and in the HPI unless unable to obtain.    Objective:  Patient Vitals for the past 24 hrs:   BP Temp Temp src Pulse Resp SpO2   24 1051 118/66 97.5 °F (36.4 °C) Oral 69 16 98 %   24 0747 119/56 97.6 °F (36.4 °C) Oral 73 16 97 %   24 0323 123/60 97.6 °F (36.4 °C) Oral 78 16 97 %   24 2330 114/58 98.1 °F (36.7 °C) Oral 80 14 96 %   24 1959 130/62 97.9 °F (36.6 °C) Oral 82 14 98 %   24  1558 145/71 97.8 °F (36.6 °C) Oral 71 16 98 %       Intake/Output Summary (Last 24 hours) at 7/3/2024 1219  Last data filed at 7/3/2024 1138  Gross per 24 hour   Intake 240 ml   Output 1100 ml   Net -860 ml     General: awake/alert   Chest:  clear to auscultation bilaterally without respiratory distress  CVS: regular rate and rhythm  Abdominal: soft, nontender, positive bowel sounds  Extremities: ble edema  Skin: warm and dry without rash      Labs:  Results from last 7 days   Lab Units 06/30/24  0638 06/29/24  0850 06/28/24 2009   WBC 10*3/mm3 6.79 7.68 10.18   HEMOGLOBIN g/dL 10.6* 10.1* 10.5*   HEMATOCRIT % 34.1* 32.5* 33.5*   PLATELETS 10*3/mm3 238 215 252         Results from last 7 days   Lab Units 07/03/24  0812 07/02/24  1631 07/02/24  0542 07/01/24  0659 06/29/24  0850 06/28/24 2009 06/27/24  0612   SODIUM mmol/L 143  --  143 139   < > 140 140   POTASSIUM mmol/L 3.5 3.6 3.2* 3.5   < > 4.2 4.3   CHLORIDE mmol/L 104  --  101 99   < > 104 106   CO2 mmol/L 29.0  --  28.0 28.0   < > 23.0 22.0   BUN mg/dL 22  --  25* 32*   < > 32* 31*   CREATININE mg/dL 1.54*  --  1.57* 1.68*   < > 2.01* 1.86*   CALCIUM mg/dL 8.8  --  8.6 8.8   < > 9.1 9.0   EGFR mL/min/1.73 48.8*  --  47.7* 44.0*   < > 35.5* 38.9*   BILIRUBIN mg/dL  --   --   --   --   --  0.3 0.4   ALK PHOS U/L  --   --   --   --   --  108 108   ALT (SGPT) U/L  --   --   --   --   --  9 10   AST (SGOT) U/L  --   --   --   --   --  14 12   GLUCOSE mg/dL 115*  --  91 151*   < > 177* 394*    < > = values in this interval not displayed.       Radiology:   Imaging Results (Last 72 Hours)       ** No results found for the last 72 hours. **            Culture:  Urine Culture   Date Value Ref Range Status   06/27/2024 >100,000 CFU/mL Escherichia coli ESBL (A)  Final     Comment:       Consider infectious disease consult.  Susceptibility results may not correlate to clinical outcomes.         Assessment     Chronic kidney disease stage IIIb- renal function is  improving  Diabetes type 2 with diabetic nephropathy  Acute on chronic diastolic congestive heart failure  Secondary hyperparathyroidism  Anemia and chronic kidney disease  CAD  Atrial Fibrillation  Hypertension  UTI        Plan:  Discussed with patient, nursing  Work-up reviewed today  Continue oral diuretics   Cardiology evaluation reviewed  Follow up labs      Vinny Hartley MD  7/3/2024  12:19 CDT

## 2024-07-04 NOTE — THERAPY DISCHARGE NOTE
Acute Care - Physical Therapy Discharge Summary  [facility]       Patient Name: Erick Luong  : 1955  MRN: [id number]    Today's Date: 2024                 Admit Date: 2024      PT Recommendation and Plan    Visit Dx:    ICD-10-CM ICD-9-CM   1. Chest pain with high risk for cardiac etiology  R07.9 786.50   2. Impaired mobility [Z74.09]  Z74.09 799.89   3. Slow transit constipation  K59.01 564.01   4. Diarrhea, unspecified type  R19.7 787.91   5. Rectal bleeding  K62.5 569.3                PT Rehab Goals       Row Name 24 1504             Bed Mobility Goal 1 (PT)    Activity/Assistive Device (Bed Mobility Goal 1, PT) bed mobility activities, all  -NW      [place] Level/Cues Needed (Bed Mobility Goal 1, PT) independent  -NW      Time Frame (Bed Mobility Goal 1, PT) 10 days  -NW      Progress/Outcomes (Bed Mobility Goal 1, PT) goal not met  -NW         Transfer Goal 1 (PT)    Activity/Assistive Device (Transfer Goal 1, PT) sit-to-stand/stand-to-sit  -NW      [place] Level/Cues Needed (Transfer Goal 1, PT) independent  -NW      Time Frame (Transfer Goal 1, PT) 10 days  -NW      Progress/Outcome (Transfer Goal 1, PT) goal not met  -NW         Gait Training Goal 1 (PT)    Activity/Assistive Device (Gait Training Goal 1, PT) gait (walking locomotion);decrease fall risk;diminish gait deviation  -NW      [place] Level (Gait Training Goal 1, PT) standby assist  -NW      Distance (Gait Training Goal 1, PT) 200ft  -NW      Time Frame (Gait Training Goal 1, PT) 10 days  -NW      Progress/Outcome (Gait Training Goal 1, PT) goal not met  -NW         Stairs Goal 1 (PT)    Activity/Assistive Device (Stairs Goal 1, PT) ascending stairs;descending stairs  no handrail at home  -NW      [place] Level/Cues Needed (Stairs Goal 1, PT) contact guard required  -NW      Number of Stairs (Stairs Goal 1, PT) 1  -NW      Time Frame (Stairs Goal 1, PT) 10 days  -NW      Progress/Outcome (Stairs Goal 1,  PT) goal not met  -NW                User Key  (r) = Recorded By, (t) = Taken By, (c) = Cosigned By      Initials Name Provider Type Discipline    Johanny Stovall PTA Physical Therapist Assistant PT                        PT Discharge Summary  Anticipated Discharge Disposition (PT): home  Reason for Discharge: Discharge from facility  Outcomes Achieved: Refer to plan of care for updates on goals achieved  Discharge Destination: Home      Johanny Chatterjee PTA   7/4/2024

## 2024-07-04 NOTE — OUTREACH NOTE
Prep Survey      Flowsheet Row Responses   [place] facility patient discharged from? [facility]   Is LACE score < 7 ? No   Eligibility Readm Mgmt   Discharge diagnosis Acute on chronic heart failure with preserved ejection fraction   Does the patient have one of the following disease processes/diagnoses(primary or secondary)? CHF   Does the patient have Home health ordered? No   Is there a DME ordered? No   Prep survey completed? Yes            Lanie CHESTER - Registered Nurse

## 2024-07-04 NOTE — THERAPY DISCHARGE NOTE
Acute Care - Physical Therapy Discharge Summary  [facility]       Patient Name: Erick Luong  : 1955  MRN: [id number]    Today's Date: 2024                 Admit Date: 2024      PT Recommendation and Plan    Visit Dx:    ICD-10-CM ICD-9-CM   1. Chest pain with high risk for cardiac etiology  R07.9 786.50   2. Impaired mobility [Z74.09]  Z74.09 799.89   3. Slow transit constipation  K59.01 564.01   4. Diarrhea, unspecified type  R19.7 787.91   5. Rectal bleeding  K62.5 569.3                PT Rehab Goals       Row Name 24 1504             Bed Mobility Goal 1 (PT)    Activity/Assistive Device (Bed Mobility Goal 1, PT) bed mobility activities, all  -NW      [place] Level/Cues Needed (Bed Mobility Goal 1, PT) independent  -NW      Time Frame (Bed Mobility Goal 1, PT) 10 days  -NW      Progress/Outcomes (Bed Mobility Goal 1, PT) goal not met  -NW         Transfer Goal 1 (PT)    Activity/Assistive Device (Transfer Goal 1, PT) sit-to-stand/stand-to-sit  -NW      [place] Level/Cues Needed (Transfer Goal 1, PT) independent  -NW      Time Frame (Transfer Goal 1, PT) 10 days  -NW      Progress/Outcome (Transfer Goal 1, PT) goal not met  -NW         Gait Training Goal 1 (PT)    Activity/Assistive Device (Gait Training Goal 1, PT) gait (walking locomotion);decrease fall risk;diminish gait deviation  -NW      [place] Level (Gait Training Goal 1, PT) standby assist  -NW      Distance (Gait Training Goal 1, PT) 200ft  -NW      Time Frame (Gait Training Goal 1, PT) 10 days  -NW      Progress/Outcome (Gait Training Goal 1, PT) goal not met  -NW         Stairs Goal 1 (PT)    Activity/Assistive Device (Stairs Goal 1, PT) ascending stairs;descending stairs  no handrail at home  -NW      [place] Level/Cues Needed (Stairs Goal 1, PT) contact guard required  -NW      Number of Stairs (Stairs Goal 1, PT) 1  -NW      Time Frame (Stairs Goal 1, PT) 10 days  -NW      Progress/Outcome (Stairs Goal 1,  PT) goal not met  -NW                User Key  (r) = Recorded By, (t) = Taken By, (c) = Cosigned By      Initials Name Provider Type Discipline    Johanny Stovall, PTA Physical Therapist Assistant PT                        PT Discharge Summary  Anticipated Discharge Disposition (PT): home  Reason for Discharge: Discharge from facility  Outcomes Achieved: Refer to plan of care for updates on goals achieved  Discharge Destination: Home      Donna Rodriguez PTA   7/4/2024

## 2024-07-09 ENCOUNTER — READMISSION MANAGEMENT (OUTPATIENT)
Dept: CALL CENTER | Facility: HOSPITAL | Age: 69
End: 2024-07-09
Payer: COMMERCIAL

## 2024-07-09 ENCOUNTER — TELEPHONE (OUTPATIENT)
Dept: CARDIOLOGY | Facility: CLINIC | Age: 69
End: 2024-07-09

## 2024-07-09 NOTE — OUTREACH NOTE
CHF Week 1 Survey      Flowsheet Row Responses   [place] facility patient discharged from? [place]   Does the patient have one of the following disease processes/diagnoses(primary or secondary)? CHF   CHF Week 1 attempt successful? No   Unsuccessful attempts Attempt 1            Clair FLANNERY - Registered Nurse

## 2024-07-09 NOTE — TELEPHONE ENCOUNTER
DELETE AFTER REVIEWING: Send this encounter to the appropriate pool. See your Call Action Grid or Workflows for direction.    Caller: Erick Luong    Relationship to patient: Self    Best call back number: [phone]    Chief complaint: N/A    Type of visit: HOSPITAL FOLLOW UP     Requested date: ASAP     If rescheduling, when is the original appointment: 07.09.2024     Additional notes:MISSED THEIR APPT AND IS ASKING TO RESCHEDULE BEFORE THEIR FOLLOW UP WITH ALFREDA ON 08. 05.2024        DELETE AFTER READING TO PATIENT: “Thank you for sharing this information with me. I will send a message to the scheduling team. Please allow 48 hours for the  staff to follow up on this request.”

## 2024-07-18 ENCOUNTER — READMISSION MANAGEMENT (OUTPATIENT)
Dept: CALL CENTER | Facility: HOSPITAL | Age: 69
End: 2024-07-18
Payer: COMMERCIAL

## 2024-07-18 NOTE — OUTREACH NOTE
CHF Week 3 Survey      Flowsheet Row Responses   [facility] patient discharged from? [place]   Does the patient have one of the following disease processes/diagnoses(primary or secondary)? CHF   Week 3 attempt successful? No   Unsuccessful attempts Attempt 2   Discharge diagnosis Acute on chronic heart failure with preserved ejection fraction            OMERO MARQUES - Registered Nurse

## 2024-07-25 ENCOUNTER — TRANSCRIBE ORDERS (OUTPATIENT)
Dept: ADMINISTRATIVE | Facility: HOSPITAL | Age: 69
End: 2024-07-25
Payer: COMMERCIAL

## 2024-07-25 ENCOUNTER — LAB (OUTPATIENT)
Dept: LAB | Facility: HOSPITAL | Age: 69
End: 2024-07-25
Payer: COMMERCIAL

## 2024-07-25 DIAGNOSIS — N18.32 CHRONIC KIDNEY DISEASE, STAGE 3B: ICD-10-CM

## 2024-07-25 DIAGNOSIS — E78.5 HYPERLIPIDEMIA, UNSPECIFIED HYPERLIPIDEMIA TYPE: ICD-10-CM

## 2024-07-25 DIAGNOSIS — E11.42 TYPE 2 DIABETES MELLITUS WITH DIABETIC POLYNEUROPATHY, UNSPECIFIED WHETHER LONG TERM INSULIN USE: Primary | ICD-10-CM

## 2024-07-25 DIAGNOSIS — N40.1 BENIGN PROSTATIC HYPERPLASIA WITH LOWER URINARY TRACT SYMPTOMS, SYMPTOM DETAILS UNSPECIFIED: ICD-10-CM

## 2024-07-25 DIAGNOSIS — E11.42 TYPE 2 DIABETES MELLITUS WITH DIABETIC POLYNEUROPATHY, UNSPECIFIED WHETHER LONG TERM INSULIN USE: ICD-10-CM

## 2024-07-25 LAB
25(OH)D3 SERPL-MCNC: 35.9 NG/ML (ref 30–100)
ALBUMIN SERPL-MCNC: 3.8 G/DL (ref 3.5–5.2)
ALBUMIN UR-MCNC: 73.7 MG/DL
ALBUMIN/GLOB SERPL: 1.2 G/DL
ALP SERPL-CCNC: 84 U/L (ref 39–117)
ALT SERPL W P-5'-P-CCNC: 7 U/L (ref 1–41)
ANION GAP SERPL CALCULATED.3IONS-SCNC: 11 MMOL/L (ref 5–15)
AST SERPL-CCNC: 11 U/L (ref 1–40)
BASOPHILS # BLD AUTO: 0.06 10*3/MM3 (ref 0–0.2)
BASOPHILS NFR BLD AUTO: 0.7 % (ref 0–1.5)
BILIRUB SERPL-MCNC: 0.3 MG/DL (ref 0–1.2)
BUN SERPL-MCNC: 43 MG/DL (ref 8–23)
BUN/CREAT SERPL: 19.2 (ref 7–25)
CALCIUM SPEC-SCNC: 9.2 MG/DL (ref 8.6–10.5)
CHLORIDE SERPL-SCNC: 106 MMOL/L (ref 98–107)
CHOLEST SERPL-MCNC: 168 MG/DL (ref 0–200)
CO2 SERPL-SCNC: 27 MMOL/L (ref 22–29)
CREAT SERPL-MCNC: 2.24 MG/DL (ref 0.76–1.27)
CREAT UR-MCNC: 96.4 MG/DL
DEPRECATED RDW RBC AUTO: 47 FL (ref 37–54)
EGFRCR SERPLBLD CKD-EPI 2021: 31.1 ML/MIN/1.73
EOSINOPHIL # BLD AUTO: 0.62 10*3/MM3 (ref 0–0.4)
EOSINOPHIL NFR BLD AUTO: 7 % (ref 0.3–6.2)
ERYTHROCYTE [DISTWIDTH] IN BLOOD BY AUTOMATED COUNT: 14.8 % (ref 12.3–15.4)
GLOBULIN UR ELPH-MCNC: 3.1 GM/DL
GLUCOSE SERPL-MCNC: 59 MG/DL (ref 65–99)
HBA1C MFR BLD: 11.6 % (ref 4.8–5.6)
HCT VFR BLD AUTO: 34.9 % (ref 37.5–51)
HDLC SERPL-MCNC: 57 MG/DL (ref 40–60)
HGB BLD-MCNC: 11.2 G/DL (ref 13–17.7)
IMM GRANULOCYTES # BLD AUTO: 0.02 10*3/MM3 (ref 0–0.05)
IMM GRANULOCYTES NFR BLD AUTO: 0.2 % (ref 0–0.5)
LDLC SERPL CALC-MCNC: 98 MG/DL (ref 0–100)
LDLC/HDLC SERPL: 1.71 {RATIO}
LYMPHOCYTES # BLD AUTO: 2.53 10*3/MM3 (ref 0.7–3.1)
LYMPHOCYTES NFR BLD AUTO: 28.4 % (ref 19.6–45.3)
MCH RBC QN AUTO: 27.8 PG (ref 26.6–33)
MCHC RBC AUTO-ENTMCNC: 32.1 G/DL (ref 31.5–35.7)
MCV RBC AUTO: 86.6 FL (ref 79–97)
MICROALBUMIN/CREAT UR: 764.5 MG/G (ref 0–29)
MONOCYTES # BLD AUTO: 0.78 10*3/MM3 (ref 0.1–0.9)
MONOCYTES NFR BLD AUTO: 8.7 % (ref 5–12)
NEUTROPHILS NFR BLD AUTO: 4.91 10*3/MM3 (ref 1.7–7)
NEUTROPHILS NFR BLD AUTO: 55 % (ref 42.7–76)
NRBC BLD AUTO-RTO: 0 /100 WBC (ref 0–0.2)
PLATELET # BLD AUTO: 223 10*3/MM3 (ref 140–450)
PMV BLD AUTO: 11.6 FL (ref 6–12)
POTASSIUM SERPL-SCNC: 3.4 MMOL/L (ref 3.5–5.2)
PROT SERPL-MCNC: 6.9 G/DL (ref 6–8.5)
PSA SERPL-MCNC: 0.4 NG/ML (ref 0–4)
PTH-INTACT SERPL-MCNC: 59.5 PG/ML (ref 15–65)
RBC # BLD AUTO: 4.03 10*6/MM3 (ref 4.14–5.8)
SODIUM SERPL-SCNC: 144 MMOL/L (ref 136–145)
TRIGL SERPL-MCNC: 69 MG/DL (ref 0–150)
VLDLC SERPL-MCNC: 13 MG/DL (ref 5–40)
WBC NRBC COR # BLD AUTO: 8.92 10*3/MM3 (ref 3.4–10.8)

## 2024-07-25 PROCEDURE — 80053 COMPREHEN METABOLIC PANEL: CPT

## 2024-07-25 PROCEDURE — 83970 ASSAY OF PARATHORMONE: CPT

## 2024-07-25 PROCEDURE — 82570 ASSAY OF URINE CREATININE: CPT

## 2024-07-25 PROCEDURE — 82306 VITAMIN D 25 HYDROXY: CPT

## 2024-07-25 PROCEDURE — 83036 HEMOGLOBIN GLYCOSYLATED A1C: CPT

## 2024-07-25 PROCEDURE — 82043 UR ALBUMIN QUANTITATIVE: CPT

## 2024-07-25 PROCEDURE — 36415 COLL VENOUS BLD VENIPUNCTURE: CPT

## 2024-07-25 PROCEDURE — 84153 ASSAY OF PSA TOTAL: CPT

## 2024-07-25 PROCEDURE — 85025 COMPLETE CBC W/AUTO DIFF WBC: CPT

## 2024-07-25 PROCEDURE — 80061 LIPID PANEL: CPT

## 2024-07-31 ENCOUNTER — APPOINTMENT (OUTPATIENT)
Dept: CT IMAGING | Facility: HOSPITAL | Age: 69
DRG: 871 | End: 2024-07-31
Payer: COMMERCIAL

## 2024-07-31 ENCOUNTER — APPOINTMENT (OUTPATIENT)
Dept: GENERAL RADIOLOGY | Facility: HOSPITAL | Age: 69
DRG: 871 | End: 2024-07-31
Payer: COMMERCIAL

## 2024-07-31 ENCOUNTER — HOSPITAL ENCOUNTER (INPATIENT)
Facility: HOSPITAL | Age: 69
LOS: 12 days | Discharge: NURSING, REHAB OR LONG-TERM CARE | DRG: 871 | End: 2024-08-12
Attending: INTERNAL MEDICINE | Admitting: FAMILY MEDICINE
Payer: COMMERCIAL

## 2024-07-31 DIAGNOSIS — E11.42 DIABETIC POLYNEUROPATHY ASSOCIATED WITH TYPE 2 DIABETES MELLITUS: ICD-10-CM

## 2024-07-31 DIAGNOSIS — I48.91 ATRIAL FIBRILLATION WITH RVR: ICD-10-CM

## 2024-07-31 DIAGNOSIS — R41.0 CONFUSION: ICD-10-CM

## 2024-07-31 DIAGNOSIS — E11.10 DIABETIC KETOACIDOSIS WITHOUT COMA ASSOCIATED WITH TYPE 2 DIABETES MELLITUS: ICD-10-CM

## 2024-07-31 DIAGNOSIS — R79.89 ELEVATED TROPONIN: ICD-10-CM

## 2024-07-31 DIAGNOSIS — L08.9 DIABETIC FOOT INFECTION: Primary | ICD-10-CM

## 2024-07-31 DIAGNOSIS — M86.9 OSTEOMYELITIS OF FOOT, UNSPECIFIED LATERALITY, UNSPECIFIED TYPE: ICD-10-CM

## 2024-07-31 DIAGNOSIS — E11.628 DIABETIC FOOT INFECTION: Primary | ICD-10-CM

## 2024-07-31 DIAGNOSIS — Z74.09 IMPAIRED MOBILITY: ICD-10-CM

## 2024-07-31 LAB
ACETONE BLD QL: ABNORMAL
ALBUMIN SERPL-MCNC: 3.5 G/DL (ref 3.5–5.2)
ALBUMIN/GLOB SERPL: 1.1 G/DL
ALP SERPL-CCNC: 101 U/L (ref 39–117)
ALT SERPL W P-5'-P-CCNC: 8 U/L (ref 1–41)
ANION GAP SERPL CALCULATED.3IONS-SCNC: 15 MMOL/L (ref 5–15)
ANION GAP SERPL CALCULATED.3IONS-SCNC: 16 MMOL/L (ref 5–15)
AST SERPL-CCNC: 11 U/L (ref 1–40)
ATMOSPHERIC PRESS: 751 MMHG
BACTERIA UR QL AUTO: ABNORMAL /HPF
BASE EXCESS BLDV CALC-SCNC: -3.6 MMOL/L (ref 0–2)
BASOPHILS # BLD AUTO: 0.05 10*3/MM3 (ref 0–0.2)
BASOPHILS NFR BLD AUTO: 0.3 % (ref 0–1.5)
BDY SITE: ABNORMAL
BILIRUB SERPL-MCNC: 0.6 MG/DL (ref 0–1.2)
BILIRUB UR QL STRIP: NEGATIVE
BODY TEMPERATURE: 37
BUN SERPL-MCNC: 54 MG/DL (ref 8–23)
BUN SERPL-MCNC: 55 MG/DL (ref 8–23)
BUN/CREAT SERPL: 20.5 (ref 7–25)
BUN/CREAT SERPL: 20.6 (ref 7–25)
CALCIUM SPEC-SCNC: 8.9 MG/DL (ref 8.6–10.5)
CALCIUM SPEC-SCNC: 9 MG/DL (ref 8.6–10.5)
CHLORIDE SERPL-SCNC: 97 MMOL/L (ref 98–107)
CHLORIDE SERPL-SCNC: 98 MMOL/L (ref 98–107)
CLARITY UR: CLEAR
CO2 SERPL-SCNC: 19 MMOL/L (ref 22–29)
CO2 SERPL-SCNC: 20 MMOL/L (ref 22–29)
COLOR UR: YELLOW
CREAT SERPL-MCNC: 2.64 MG/DL (ref 0.76–1.27)
CREAT SERPL-MCNC: 2.67 MG/DL (ref 0.76–1.27)
CRP SERPL-MCNC: 17.04 MG/DL (ref 0–0.5)
D-LACTATE SERPL-SCNC: 2.2 MMOL/L (ref 0.5–2)
D-LACTATE SERPL-SCNC: 2.4 MMOL/L (ref 0.5–2)
DEPRECATED RDW RBC AUTO: 43.8 FL (ref 37–54)
EGFRCR SERPLBLD CKD-EPI 2021: 25.2 ML/MIN/1.73
EGFRCR SERPLBLD CKD-EPI 2021: 25.6 ML/MIN/1.73
EOSINOPHIL # BLD AUTO: 0.01 10*3/MM3 (ref 0–0.4)
EOSINOPHIL NFR BLD AUTO: 0.1 % (ref 0.3–6.2)
ERYTHROCYTE [DISTWIDTH] IN BLOOD BY AUTOMATED COUNT: 14.5 % (ref 12.3–15.4)
ERYTHROCYTE [SEDIMENTATION RATE] IN BLOOD: 57 MM/HR (ref 0–20)
GEN 5 2HR TROPONIN T REFLEX: 353 NG/L
GLOBULIN UR ELPH-MCNC: 3.2 GM/DL
GLUCOSE BLDC GLUCOMTR-MCNC: 237 MG/DL (ref 70–130)
GLUCOSE BLDC GLUCOMTR-MCNC: 369 MG/DL (ref 70–130)
GLUCOSE BLDC GLUCOMTR-MCNC: 543 MG/DL (ref 70–130)
GLUCOSE BLDC GLUCOMTR-MCNC: 575 MG/DL (ref 70–130)
GLUCOSE SERPL-MCNC: 653 MG/DL (ref 65–99)
GLUCOSE SERPL-MCNC: 704 MG/DL (ref 65–99)
GLUCOSE UR STRIP-MCNC: ABNORMAL MG/DL
HBA1C MFR BLD: 12.6 % (ref 4.8–5.6)
HCO3 BLDV-SCNC: 20.4 MMOL/L (ref 22–28)
HCT VFR BLD AUTO: 29.6 % (ref 37.5–51)
HGB BLD-MCNC: 9.8 G/DL (ref 13–17.7)
HGB UR QL STRIP.AUTO: ABNORMAL
HYALINE CASTS UR QL AUTO: ABNORMAL /LPF
IMM GRANULOCYTES # BLD AUTO: 0.14 10*3/MM3 (ref 0–0.05)
IMM GRANULOCYTES NFR BLD AUTO: 0.9 % (ref 0–0.5)
INHALED O2 CONCENTRATION: 21 %
KETONES UR QL STRIP: ABNORMAL
LEUKOCYTE ESTERASE UR QL STRIP.AUTO: NEGATIVE
LYMPHOCYTES # BLD AUTO: 0.45 10*3/MM3 (ref 0.7–3.1)
LYMPHOCYTES NFR BLD AUTO: 2.9 % (ref 19.6–45.3)
Lab: ABNORMAL
MAGNESIUM SERPL-MCNC: 2.2 MG/DL (ref 1.6–2.4)
MAGNESIUM SERPL-MCNC: 2.3 MG/DL (ref 1.6–2.4)
MCH RBC QN AUTO: 27.6 PG (ref 26.6–33)
MCHC RBC AUTO-ENTMCNC: 33.1 G/DL (ref 31.5–35.7)
MCV RBC AUTO: 83.4 FL (ref 79–97)
MODALITY: ABNORMAL
MONOCYTES # BLD AUTO: 1.1 10*3/MM3 (ref 0.1–0.9)
MONOCYTES NFR BLD AUTO: 7.1 % (ref 5–12)
NEUTROPHILS NFR BLD AUTO: 13.73 10*3/MM3 (ref 1.7–7)
NEUTROPHILS NFR BLD AUTO: 88.7 % (ref 42.7–76)
NITRITE UR QL STRIP: POSITIVE
OSMOLALITY SERPL: 329 MOSM/KG (ref 280–301)
PCO2 BLDV: 32.2 MM HG (ref 41–51)
PH BLDV: 7.41 PH UNITS (ref 7.32–7.42)
PH UR STRIP.AUTO: <=5 [PH] (ref 5–8)
PHOSPHATE SERPL-MCNC: 2.7 MG/DL (ref 2.5–4.5)
PHOSPHATE SERPL-MCNC: 3.1 MG/DL (ref 2.5–4.5)
PLATELET # BLD AUTO: 201 10*3/MM3 (ref 140–450)
PMV BLD AUTO: 13.1 FL (ref 6–12)
PO2 BLDV: 55.6 MM HG (ref 27–53)
POTASSIUM SERPL-SCNC: 4.4 MMOL/L (ref 3.5–5.2)
POTASSIUM SERPL-SCNC: 4.5 MMOL/L (ref 3.5–5.2)
PROT SERPL-MCNC: 6.7 G/DL (ref 6–8.5)
PROT UR QL STRIP: ABNORMAL
RBC # BLD AUTO: 3.55 10*6/MM3 (ref 4.14–5.8)
RBC # UR STRIP: ABNORMAL /HPF
REF LAB TEST METHOD: ABNORMAL
SAO2 % BLDCOV: 89.9 % (ref 45–75)
SODIUM SERPL-SCNC: 132 MMOL/L (ref 136–145)
SODIUM SERPL-SCNC: 133 MMOL/L (ref 136–145)
SP GR UR STRIP: 1.03 (ref 1–1.03)
SQUAMOUS #/AREA URNS HPF: ABNORMAL /HPF
TROPONIN T DELTA: 11 NG/L
TROPONIN T SERPL HS-MCNC: 342 NG/L
UROBILINOGEN UR QL STRIP: ABNORMAL
VENTILATOR MODE: ABNORMAL
WBC # UR STRIP: ABNORMAL /HPF
WBC NRBC COR # BLD AUTO: 15.48 10*3/MM3 (ref 3.4–10.8)

## 2024-07-31 PROCEDURE — 93005 ELECTROCARDIOGRAM TRACING: CPT | Performed by: INTERNAL MEDICINE

## 2024-07-31 PROCEDURE — 87147 CULTURE TYPE IMMUNOLOGIC: CPT | Performed by: INTERNAL MEDICINE

## 2024-07-31 PROCEDURE — 25010000002 POTASSIUM CHLORIDE PER 2 MEQ: Performed by: INTERNAL MEDICINE

## 2024-07-31 PROCEDURE — 82803 BLOOD GASES ANY COMBINATION: CPT

## 2024-07-31 PROCEDURE — 83735 ASSAY OF MAGNESIUM: CPT | Performed by: INTERNAL MEDICINE

## 2024-07-31 PROCEDURE — 87181 SC STD AGAR DILUTION PER AGT: CPT | Performed by: INTERNAL MEDICINE

## 2024-07-31 PROCEDURE — 25010000002 VANCOMYCIN 1 G RECONSTITUTED SOLUTION 1 EACH VIAL: Performed by: INTERNAL MEDICINE

## 2024-07-31 PROCEDURE — 99285 EMERGENCY DEPT VISIT HI MDM: CPT

## 2024-07-31 PROCEDURE — 85652 RBC SED RATE AUTOMATED: CPT | Performed by: INTERNAL MEDICINE

## 2024-07-31 PROCEDURE — 93010 ELECTROCARDIOGRAM REPORT: CPT | Performed by: INTERNAL MEDICINE

## 2024-07-31 PROCEDURE — 86140 C-REACTIVE PROTEIN: CPT | Performed by: INTERNAL MEDICINE

## 2024-07-31 PROCEDURE — 25810000003 SODIUM CHLORIDE 0.9 % SOLUTION 500 ML FLEX CONT: Performed by: INTERNAL MEDICINE

## 2024-07-31 PROCEDURE — 74177 CT ABD & PELVIS W/CONTRAST: CPT

## 2024-07-31 PROCEDURE — 25010000002 DIPHENHYDRAMINE PER 50 MG: Performed by: INTERNAL MEDICINE

## 2024-07-31 PROCEDURE — 83605 ASSAY OF LACTIC ACID: CPT | Performed by: INTERNAL MEDICINE

## 2024-07-31 PROCEDURE — 25010000002 PIPERACILLIN SOD-TAZOBACTAM PER 1 G: Performed by: INTERNAL MEDICINE

## 2024-07-31 PROCEDURE — 87186 SC STD MICRODIL/AGAR DIL: CPT | Performed by: INTERNAL MEDICINE

## 2024-07-31 PROCEDURE — 81001 URINALYSIS AUTO W/SCOPE: CPT | Performed by: INTERNAL MEDICINE

## 2024-07-31 PROCEDURE — 83930 ASSAY OF BLOOD OSMOLALITY: CPT | Performed by: INTERNAL MEDICINE

## 2024-07-31 PROCEDURE — 82948 REAGENT STRIP/BLOOD GLUCOSE: CPT

## 2024-07-31 PROCEDURE — 84100 ASSAY OF PHOSPHORUS: CPT | Performed by: INTERNAL MEDICINE

## 2024-07-31 PROCEDURE — 87040 BLOOD CULTURE FOR BACTERIA: CPT | Performed by: INTERNAL MEDICINE

## 2024-07-31 PROCEDURE — 71045 X-RAY EXAM CHEST 1 VIEW: CPT

## 2024-07-31 PROCEDURE — 25510000001 IOPAMIDOL 61 % SOLUTION: Performed by: INTERNAL MEDICINE

## 2024-07-31 PROCEDURE — 82009 KETONE BODYS QUAL: CPT | Performed by: INTERNAL MEDICINE

## 2024-07-31 PROCEDURE — 85025 COMPLETE CBC W/AUTO DIFF WBC: CPT | Performed by: INTERNAL MEDICINE

## 2024-07-31 PROCEDURE — 25810000003 SODIUM CHLORIDE 0.9 % SOLUTION: Performed by: INTERNAL MEDICINE

## 2024-07-31 PROCEDURE — 73630 X-RAY EXAM OF FOOT: CPT

## 2024-07-31 PROCEDURE — P9612 CATHETERIZE FOR URINE SPEC: HCPCS

## 2024-07-31 PROCEDURE — 0 INSULIN REGULAR HUMAN PER 5 UNITS: Performed by: INTERNAL MEDICINE

## 2024-07-31 PROCEDURE — 25810000003 LACTATED RINGERS SOLUTION: Performed by: INTERNAL MEDICINE

## 2024-07-31 PROCEDURE — 70450 CT HEAD/BRAIN W/O DYE: CPT

## 2024-07-31 PROCEDURE — 83036 HEMOGLOBIN GLYCOSYLATED A1C: CPT | Performed by: INTERNAL MEDICINE

## 2024-07-31 PROCEDURE — 87154 CUL TYP ID BLD PTHGN 6+ TRGT: CPT | Performed by: INTERNAL MEDICINE

## 2024-07-31 PROCEDURE — 87088 URINE BACTERIA CULTURE: CPT | Performed by: INTERNAL MEDICINE

## 2024-07-31 PROCEDURE — 36415 COLL VENOUS BLD VENIPUNCTURE: CPT | Performed by: INTERNAL MEDICINE

## 2024-07-31 PROCEDURE — 80053 COMPREHEN METABOLIC PANEL: CPT | Performed by: INTERNAL MEDICINE

## 2024-07-31 PROCEDURE — 84484 ASSAY OF TROPONIN QUANT: CPT | Performed by: INTERNAL MEDICINE

## 2024-07-31 PROCEDURE — 87086 URINE CULTURE/COLONY COUNT: CPT | Performed by: INTERNAL MEDICINE

## 2024-07-31 RX ORDER — DILTIAZEM HCL/D5W 125 MG/125
5-15 PLASTIC BAG, INJECTION (ML) INTRAVENOUS
Status: DISCONTINUED | OUTPATIENT
Start: 2024-07-31 | End: 2024-08-02

## 2024-07-31 RX ORDER — SODIUM CHLORIDE 450 MG/100ML
250 INJECTION, SOLUTION INTRAVENOUS CONTINUOUS PRN
Status: DISCONTINUED | OUTPATIENT
Start: 2024-07-31 | End: 2024-08-01

## 2024-07-31 RX ORDER — DEXTROSE MONOHYDRATE, SODIUM CHLORIDE, AND POTASSIUM CHLORIDE 50; 1.49; 9 G/1000ML; G/1000ML; G/1000ML
150 INJECTION, SOLUTION INTRAVENOUS CONTINUOUS PRN
Status: DISCONTINUED | OUTPATIENT
Start: 2024-07-31 | End: 2024-08-01

## 2024-07-31 RX ORDER — SODIUM CHLORIDE 9 MG/ML
250 INJECTION, SOLUTION INTRAVENOUS CONTINUOUS PRN
Status: DISCONTINUED | OUTPATIENT
Start: 2024-07-31 | End: 2024-08-01

## 2024-07-31 RX ORDER — DEXTROSE MONOHYDRATE, SODIUM CHLORIDE, AND POTASSIUM CHLORIDE 50; 1.49; 4.5 G/1000ML; G/1000ML; G/1000ML
150 INJECTION, SOLUTION INTRAVENOUS CONTINUOUS PRN
Status: DISCONTINUED | OUTPATIENT
Start: 2024-07-31 | End: 2024-08-01

## 2024-07-31 RX ORDER — DEXTROSE MONOHYDRATE, SODIUM CHLORIDE, AND POTASSIUM CHLORIDE 50; 2.98; 4.5 G/1000ML; G/1000ML; G/1000ML
150 INJECTION, SOLUTION INTRAVENOUS CONTINUOUS PRN
Status: DISCONTINUED | OUTPATIENT
Start: 2024-07-31 | End: 2024-08-01

## 2024-07-31 RX ORDER — DEXTROSE MONOHYDRATE 25 G/50ML
10-50 INJECTION, SOLUTION INTRAVENOUS
Status: DISCONTINUED | OUTPATIENT
Start: 2024-07-31 | End: 2024-08-12 | Stop reason: HOSPADM

## 2024-07-31 RX ORDER — DEXTROSE MONOHYDRATE, SODIUM CHLORIDE, AND POTASSIUM CHLORIDE 50; 2.98; 9 G/1000ML; G/1000ML; G/1000ML
150 INJECTION, SOLUTION INTRAVENOUS CONTINUOUS PRN
Status: DISCONTINUED | OUTPATIENT
Start: 2024-07-31 | End: 2024-08-01

## 2024-07-31 RX ORDER — DEXTROSE MONOHYDRATE AND SODIUM CHLORIDE 5; .45 G/100ML; G/100ML
150 INJECTION, SOLUTION INTRAVENOUS CONTINUOUS PRN
Status: DISCONTINUED | OUTPATIENT
Start: 2024-07-31 | End: 2024-08-01

## 2024-07-31 RX ORDER — IBUPROFEN 600 MG/1
1 TABLET ORAL
Status: DISCONTINUED | OUTPATIENT
Start: 2024-07-31 | End: 2024-08-01

## 2024-07-31 RX ORDER — SODIUM CHLORIDE AND POTASSIUM CHLORIDE 150; 900 MG/100ML; MG/100ML
250 INJECTION, SOLUTION INTRAVENOUS CONTINUOUS PRN
Status: DISCONTINUED | OUTPATIENT
Start: 2024-07-31 | End: 2024-08-01

## 2024-07-31 RX ORDER — SODIUM CHLORIDE 0.9 % (FLUSH) 0.9 %
10 SYRINGE (ML) INJECTION AS NEEDED
Status: DISCONTINUED | OUTPATIENT
Start: 2024-07-31 | End: 2024-08-12 | Stop reason: HOSPADM

## 2024-07-31 RX ORDER — SODIUM CHLORIDE 0.9 % (FLUSH) 0.9 %
10 SYRINGE (ML) INJECTION EVERY 12 HOURS SCHEDULED
Status: DISCONTINUED | OUTPATIENT
Start: 2024-07-31 | End: 2024-08-12 | Stop reason: HOSPADM

## 2024-07-31 RX ORDER — ENOXAPARIN SODIUM 150 MG/ML
1 INJECTION SUBCUTANEOUS EVERY 12 HOURS
Status: DISCONTINUED | OUTPATIENT
Start: 2024-07-31 | End: 2024-08-12 | Stop reason: HOSPADM

## 2024-07-31 RX ORDER — DEXTROSE MONOHYDRATE AND SODIUM CHLORIDE 5; .9 G/100ML; G/100ML
150 INJECTION, SOLUTION INTRAVENOUS CONTINUOUS PRN
Status: DISCONTINUED | OUTPATIENT
Start: 2024-07-31 | End: 2024-08-01

## 2024-07-31 RX ORDER — DIPHENHYDRAMINE HYDROCHLORIDE 50 MG/ML
6 INJECTION INTRAMUSCULAR; INTRAVENOUS ONCE
Status: COMPLETED | OUTPATIENT
Start: 2024-07-31 | End: 2024-07-31

## 2024-07-31 RX ORDER — SODIUM CHLORIDE AND POTASSIUM CHLORIDE 300; 900 MG/100ML; MG/100ML
250 INJECTION, SOLUTION INTRAVENOUS CONTINUOUS PRN
Status: DISCONTINUED | OUTPATIENT
Start: 2024-07-31 | End: 2024-08-01

## 2024-07-31 RX ORDER — NICOTINE POLACRILEX 4 MG
15 LOZENGE BUCCAL
Status: DISCONTINUED | OUTPATIENT
Start: 2024-07-31 | End: 2024-08-01 | Stop reason: SDUPTHER

## 2024-07-31 RX ORDER — SODIUM CHLORIDE AND POTASSIUM CHLORIDE 150; 450 MG/100ML; MG/100ML
250 INJECTION, SOLUTION INTRAVENOUS CONTINUOUS PRN
Status: DISCONTINUED | OUTPATIENT
Start: 2024-07-31 | End: 2024-08-01

## 2024-07-31 RX ORDER — SODIUM CHLORIDE 9 MG/ML
40 INJECTION, SOLUTION INTRAVENOUS AS NEEDED
Status: DISCONTINUED | OUTPATIENT
Start: 2024-07-31 | End: 2024-08-12 | Stop reason: HOSPADM

## 2024-07-31 RX ADMIN — SODIUM CHLORIDE, POTASSIUM CHLORIDE, SODIUM LACTATE AND CALCIUM CHLORIDE 1000 ML: 600; 310; 30; 20 INJECTION, SOLUTION INTRAVENOUS at 19:01

## 2024-07-31 RX ADMIN — VANCOMYCIN HYDROCHLORIDE 2500 MG: 1 INJECTION, POWDER, LYOPHILIZED, FOR SOLUTION INTRAVENOUS at 20:56

## 2024-07-31 RX ADMIN — POTASSIUM CHLORIDE AND SODIUM CHLORIDE 250 ML/HR: 450; 150 INJECTION, SOLUTION INTRAVENOUS at 23:05

## 2024-07-31 RX ADMIN — DIPHENHYDRAMINE HYDROCHLORIDE 6 MG: 50 INJECTION, SOLUTION INTRAMUSCULAR; INTRAVENOUS at 20:58

## 2024-07-31 RX ADMIN — SODIUM CHLORIDE 5 UNITS/HR: 9 INJECTION, SOLUTION INTRAVENOUS at 20:22

## 2024-07-31 RX ADMIN — IOPAMIDOL 100 ML: 612 INJECTION, SOLUTION INTRAVENOUS at 23:54

## 2024-07-31 RX ADMIN — SODIUM CHLORIDE 1000 ML/HR: 9 INJECTION, SOLUTION INTRAVENOUS at 20:19

## 2024-07-31 RX ADMIN — PIPERACILLIN SODIUM AND TAZOBACTAM SODIUM 3.38 G: 3; .375 INJECTION, POWDER, LYOPHILIZED, FOR SOLUTION INTRAVENOUS at 19:02

## 2024-07-31 RX ADMIN — Medication 5 MG/HR: at 19:34

## 2024-07-31 RX ADMIN — Medication 10 ML: at 20:23

## 2024-07-31 NOTE — ED PROVIDER NOTES
"Subjective   History of Present Illness  69-year-old male who was found wandering his garage confused.  He is able to tell me that he is at [facility].  He tells me that he has chest pain.  No family is present on my initial exam.  Glucose was unable to be detected on POCT meter.    Review of Systems   Psychiatric/Behavioral:  Positive for confusion.        Past Medical History:   Diagnosis Date    Arthritis     Autonomic disease     CAD (coronary artery disease) 02/06/2017    Cervical radiculopathy 09/16/2021    Chronic constipation with acute exaccerbation 05/10/2021    Coronary artery disease     Degeneration of cervical intervertebral disc 08/11/2021    Diabetes mellitus     Diabetic foot ulcer 08/31/2020    Diabetic polyneuropathy associated with type 2 diabetes mellitus 01/18/2021    Elevated cholesterol     Gastroesophageal reflux disease 05/13/2019    Headache     HTN (hypertension), benign 05/03/2017    Hyperlipidemia     Hypertension     Mixed hyperlipidemia 02/07/2017    Multiple lung nodules 01/26/2020    5mm, 9 mm RLL identified 1/2020, not present 10/2019.    Myocardial infarction     Osteomyelitis 01/22/2020    Osteomyelitis of fifth toe of right foot 10/07/2019    Pancreatitis     Persistent insomnia 01/20/2020    Renal disorder     Sleep apnea 02/06/2017    Sleep apnea with use of continuous positive airway pressure (CPAP)     NON-COMPLIANT    Slow transit constipation 01/16/2019    Spinal stenosis in cervical region 09/16/2021    Vitamin D deficiency 03/02/2021       Allergies   Allergen Reactions    Cefepime Hives and Anaphylaxis    Bactrim [Sulfamethoxazole-Trimethoprim] Other (See Comments)     \"RENAL FAILURE\"    Vancomycin Itching    Zolpidem Mental Status Change     \"makes him crazy\"    Metronidazole Rash       Past Surgical History:   Procedure Laterality Date    ABDOMINAL SURGERY      AMPUTATION FOOT / TOE Left 10/2021    5th digit     ANTERIOR CERVICAL DISCECTOMY W/ FUSION N/A " 08/05/2022    Procedure: CERVICAL DISCECTOMY ANTERIOR WITH FUSION C5-6 with possible plating of C5-7 with neuromonitoring and 1 c-arm;  Surgeon: Karel Soliz MD;  Location: [facility] OR;  Service: Neurosurgery;  Laterality: N/A;    APPENDECTOMY      BACK SURGERY      CARDIAC CATHETERIZATION Left 02/08/2021    Procedure: Left Heart Cath w poss intervention left anatomical snuff box acess;  Surgeon: Omkar Charles DO;  Location: [facility] CATH INVASIVE LOCATION;  Service: Cardiology;  Laterality: Left;    CARDIAC SURGERY      CATARACT EXTRACTION      CERVICAL SPINE SURGERY      COLONOSCOPY N/A 01/31/2017    Normal exam repeat in 5 years    COLONOSCOPY N/A 02/11/2019    Mild acute inflammation    COLONOSCOPY N/A 04/07/2024    2 areas at 10 and 20 cm with friability ulceration 2 clips placed at 20 cm and 4 clips at 10 cm poor prep normal mucosa,mild eroisions and ulcerations in visible vessels    COLONOSCOPY N/A 7/1/2024    Procedure: COLONOSCOPY WITH ANESTHESIA;  Surgeon: Arsalan Lorenzo DO;  Location: [facility] ENDOSCOPY;  Service: Gastroenterology;  Laterality: N/A;  pre op constipation/diarrhea  post poor prep  pcp Del Shetty    COLONOSCOPY N/A 7/2/2024    Procedure: COLONOSCOPY WITH ANESTHESIA;  Surgeon: Agapito Christopher MD;  Location: [facility] ENDOSCOPY;  Service: Gastroenterology;  Laterality: N/A;  pre rectal bleeding  post poor prep  pcp Del Shetty MD    COLONOSCOPY W/ POLYPECTOMY  03/04/2014    Hyperplastic polyp    CORONARY ARTERY BYPASS GRAFT  10/2015    ENDOSCOPY  04/13/2011    Gastritis with hemorrhage    ENDOSCOPY N/A 05/05/2017    Normal exam    ENDOSCOPY N/A 02/11/2019    Gastritis    ENDOSCOPY N/A 09/01/2020    Non-erosive gastritis with hemorrhage    ENDOSCOPY N/A 02/10/2021    Esophagitis    ENDOSCOPY N/A 04/11/2024    There were esophageal mucosal changes suspicious for short-segment Low's esophagus present in the distal esophagus. The maximum longitudinal extent of these  mucosal changes was 2 cm in length. Mucosa was biopsied with a cold forceps for histologyDistal esophagus, biopsies: Mild chronic active esophagogastritis. No evidence of intestinal metaplasia, dysplasia. Antrum, bx, Mild chronic gastritis    FOOT SURGERY Left     INCISION AND DRAINAGE OF WOUND Left 2007    spider bite       Family History   Problem Relation Age of Onset    Colon cancer Father     Heart disease Father     Colon cancer Sister     Colon polyps Sister     Alzheimer's disease Mother     Coronary artery disease Sister     Coronary artery disease Sister        Social History     Socioeconomic History    Marital status:    Tobacco Use    Smoking status: Former     Current packs/day: 0.00     Types: Cigarettes     Quit date:      Years since quittin.6    Smokeless tobacco: Never    Tobacco comments:     smoked in [employer]   Vaping Use    Vaping status: Never Used   Substance and Sexual Activity    Alcohol use: No    Drug use: No    Sexual activity: Defer           Objective   Physical Exam  Vitals reviewed.   Constitutional:       Appearance: He is obese. He is ill-appearing.      Comments: The patient smells of urine.   HENT:      Head: Normocephalic and atraumatic.      Nose: Nose normal.   Eyes:      General: No scleral icterus.     Conjunctiva/sclera: Conjunctivae normal.      Pupils: Pupils are equal.   Cardiovascular:      Rate and Rhythm: Normal rate and regular rhythm.      Heart sounds: Normal heart sounds.   Pulmonary:      Effort: Pulmonary effort is normal.      Breath sounds: Normal breath sounds.   Abdominal:      General: Bowel sounds are normal.      Palpations: Abdomen is soft.   Musculoskeletal:         General: No tenderness.      Cervical back: Normal range of motion and neck supple.   Skin:     General: Skin is warm and dry.      Comments: The patient has red legs.  He has excoriations on his pretibial surfaces.  He has a right knee abrasion.  His toes are red and  edematous.  It appears that he has a healed wound on the left heel, but it is caked with hair and dirt.  His groin is excoriated.   Neurological:      Mental Status: He is confused.      Cranial Nerves: No cranial nerve deficit, dysarthria or facial asymmetry.   Psychiatric:      Comments: He is able to tell me that he is at [facility].         Procedures       Lab Results (last 24 hours)       Procedure Component Value Units Date/Time    CBC & Differential [[id number]]  (Abnormal) Collected: 07/31/24 1849    Specimen: Blood Updated: 07/31/24 1906    Narrative:      The following orders were created for panel order CBC & Differential.  Procedure                               Abnormality         Status                     ---------                               -----------         ------                     CBC Auto Differential[[id number]]        Abnormal            Final result                 Please view results for these tests on the individual orders.    Comprehensive Metabolic Panel [[id number]]  (Abnormal) Collected: 07/31/24 1849    Specimen: Blood Updated: 07/31/24 1944     Glucose 704 mg/dL      BUN 55 mg/dL      Creatinine 2.67 mg/dL      Sodium 132 mmol/L      Potassium 4.5 mmol/L      Chloride 97 mmol/L      CO2 19.0 mmol/L      Calcium 9.0 mg/dL      Total Protein 6.7 g/dL      Albumin 3.5 g/dL      ALT (SGPT) 8 U/L      AST (SGOT) 11 U/L      Alkaline Phosphatase 101 U/L      Total Bilirubin 0.6 mg/dL      Globulin 3.2 gm/dL      A/G Ratio 1.1 g/dL      BUN/Creatinine Ratio 20.6     Anion Gap 16.0 mmol/L      eGFR 25.2 mL/min/1.73     Narrative:      GFR Normal >60  Chronic Kidney Disease <60  Kidney Failure <15      High Sensitivity Troponin T [[id number]]  (Abnormal) Collected: 07/31/24 1849    Specimen: Blood Updated: 07/31/24 1929     HS Troponin T 342 ng/L     Narrative:      High Sensitive Troponin T Reference Range:  <14.0 ng/L- Negative Female for AMI  <22.0 ng/L- Negative Male for AMI  >=14 -  Abnormal Female indicating possible myocardial injury.  >=22 - Abnormal Male indicating possible myocardial injury.   Clinicians would have to utilize clinical acumen, EKG, Troponin, and serial changes to determine if it is an Acute Myocardial Infarction or myocardial injury due to an underlying chronic condition.         Acetone [[id number]]  (Abnormal) Collected: 07/31/24 1849    Specimen: Blood Updated: 07/31/24 1910     Acetone Small    CBC Auto Differential [[id number]]  (Abnormal) Collected: 07/31/24 1849    Specimen: Blood Updated: 07/31/24 1906     WBC 15.48 10*3/mm3      RBC 3.55 10*6/mm3      Hemoglobin 9.8 g/dL      Hematocrit 29.6 %      MCV 83.4 fL      MCH 27.6 pg      MCHC 33.1 g/dL      RDW 14.5 %      RDW-SD 43.8 fl      MPV 13.1 fL      Platelets 201 10*3/mm3      Neutrophil % 88.7 %      Lymphocyte % 2.9 %      Monocyte % 7.1 %      Eosinophil % 0.1 %      Basophil % 0.3 %      Immature Grans % 0.9 %      Neutrophils, Absolute 13.73 10*3/mm3      Lymphocytes, Absolute 0.45 10*3/mm3      Monocytes, Absolute 1.10 10*3/mm3      Eosinophils, Absolute 0.01 10*3/mm3      Basophils, Absolute 0.05 10*3/mm3      Immature Grans, Absolute 0.14 10*3/mm3     Blood Culture - Blood, Hand, Left [[id number]] Collected: 07/31/24 1849    Specimen: Blood from Hand, Left Updated: 07/31/24 1902    Blood Culture - Blood, Arm, Left [[id number]] Collected: 07/31/24 1849    Specimen: Blood from Arm, Left Updated: 07/31/24 1902    Sedimentation Rate [[id number]]  (Abnormal) Collected: 07/31/24 1849    Specimen: Blood Updated: 07/31/24 1910     Sed Rate 57 mm/hr     C-reactive Protein [[id number]]  (Abnormal) Collected: 07/31/24 1849    Specimen: Blood Updated: 07/31/24 1932     C-Reactive Protein 17.04 mg/dL     Lactic Acid, Plasma [[id number]]  (Abnormal) Collected: 07/31/24 1849    Specimen: Blood Updated: 07/31/24 1928     Lactate 2.4 mmol/L     Phosphorus [[id number]]  (Normal) Collected: 07/31/24 1849    Specimen: Blood  Updated: 07/31/24 2110     Phosphorus 2.7 mg/dL     Magnesium [[id number]]  (Normal) Collected: 07/31/24 1849    Specimen: Blood Updated: 07/31/24 2110     Magnesium 2.2 mg/dL     Hemoglobin A1c [[id number]]  (Abnormal) Collected: 07/31/24 1849    Specimen: Blood Updated: 07/31/24 2004     Hemoglobin A1C 12.60 %     Narrative:      Hemoglobin A1C Ranges:    Increased Risk for Diabetes  5.7% to 6.4%  Diabetes                     >= 6.5%  Diabetic Goal                < 7.0%    Urinalysis With Culture If Indicated - Straight Cath [[id number]]  (Abnormal) Collected: 07/31/24 1851    Specimen: Urine from Straight Cath Updated: 07/31/24 1924     Color, UA Yellow     Appearance, UA Clear     pH, UA <=5.0     Specific Gravity, UA 1.028     Glucose, UA >=1000 mg/dL (3+)     Ketones, UA Trace     Bilirubin, UA Negative     Blood, UA Trace     Protein,  mg/dL (2+)     Leuk Esterase, UA Negative     Nitrite, UA Positive     Urobilinogen, UA 0.2 E.U./dL    Narrative:      In absence of clinical symptoms, the presence of pyuria, bacteria, and/or nitrites on the urinalysis result does not correlate with infection.    Urinalysis, Microscopic Only - Straight Cath [[id number]]  (Abnormal) Collected: 07/31/24 1851    Specimen: Urine from Straight Cath Updated: 07/31/24 1924     RBC, UA 3-5 /HPF      WBC, UA 6-10 /HPF      Bacteria, UA 2+ /HPF      Squamous Epithelial Cells, UA 0-2 /HPF      Hyaline Casts, UA None Seen /LPF      Methodology Manual Light Microscopy    Urine Culture - Urine, Straight Cath [[id number]] Collected: 07/31/24 1851    Specimen: Urine from Straight Cath Updated: 07/31/24 1924    Blood Gas, Venous - [[id number]]  (Abnormal) Collected: 07/31/24 2021    Specimen: Venous Blood Updated: 07/31/24 2021     Site OTHER     pH, Venous 7.410 pH Units      pCO2, Venous 32.2 mm Hg      Comment: 84 Value below reference range        pO2, Venous 55.6 mm Hg      Comment: 83 Value above reference range        HCO3, Venous 20.4  mmol/L      Comment: 84 Value below reference range        Base Excess, Venous -3.6 mmol/L      Comment: 84 Value below reference range        O2 Saturation, Venous 89.9 %      Comment: 83 Value above reference range        Temperature 37.0     Barometric Pressure for Blood Gas 751 mmHg      Modality Room Air     FIO2 21 %      Ventilator Mode NA     Collected by [id number]     Comment: Meter: [id number]     :  [email or web address]       POC Glucose Once [[id number]]  (Abnormal) Collected: 07/31/24 2021    Specimen: Blood Updated: 07/31/24 2033     Glucose 575 mg/dL      Comment: : [id number] Ryan Garcia ID: [id number]       High Sensitivity Troponin T 2Hr [[id number]]  (Abnormal) Collected: 07/31/24 2030    Specimen: Blood Updated: 07/31/24 2058     HS Troponin T 353 ng/L      Troponin T Delta 11 ng/L     Narrative:      High Sensitive Troponin T Reference Range:  <14.0 ng/L- Negative Female for AMI  <22.0 ng/L- Negative Male for AMI  >=14 - Abnormal Female indicating possible myocardial injury.  >=22 - Abnormal Male indicating possible myocardial injury.   Clinicians would have to utilize clinical acumen, EKG, Troponin, and serial changes to determine if it is an Acute Myocardial Infarction or myocardial injury due to an underlying chronic condition.         Osmolality, Serum [[id number]]  (Abnormal) Collected: 07/31/24 2030    Specimen: Blood Updated: 07/31/24 2103     Osmolality 329 mOsm/kg     Basic Metabolic Panel [[id number]]  (Abnormal) Collected: 07/31/24 2030    Specimen: Blood Updated: 07/31/24 2111     Glucose 653 mg/dL      BUN 54 mg/dL      Creatinine 2.64 mg/dL      Sodium 133 mmol/L      Potassium 4.4 mmol/L      Chloride 98 mmol/L      CO2 20.0 mmol/L      Calcium 8.9 mg/dL      BUN/Creatinine Ratio 20.5     Anion Gap 15.0 mmol/L      eGFR 25.6 mL/min/1.73     Narrative:      GFR Normal >60  Chronic Kidney Disease <60  Kidney Failure <15      Magnesium [[id number]]  (Normal) Collected:  07/31/24 2030    Specimen: Blood Updated: 07/31/24 2056     Magnesium 2.3 mg/dL     Phosphorus [[id number]]  (Normal) Collected: 07/31/24 2030    Specimen: Blood Updated: 07/31/24 2054     Phosphorus 3.1 mg/dL           XR Chest 1 View   Final Result   1. Hypoventilation with vascular crowding.   2. Mild bronchial wall thickening, stable.               This report was signed and finalized on 7/31/2024 7:41 PM by Dr. Raz Mendes MD.          XR Foot 3+ View Left   Final Result   1. Deep ulcer on the sole of the foot posteriorly near the calcaneus.   There is a questionable small area of bony erosion along the plantar   surface of the calcaneus just proximal to the plantar calcaneal spur.   Osteomyelitis cannot be ruled out. The soft tissue ulcer is packed with   some type of radiopaque material.   2. Linear foreign bodies projected over the soft tissues of the second   toe and first toe. Artifacts are also included in the differential.   3. Other chronic changes, as discussed.           This report was signed and finalized on 7/31/2024 7:47 PM by Dr. Raz Mendes MD.                ED Course  ED Course as of 07/31/24 2120 Wed Jul 31, 2024 2117 Spoke with the ICU NP. He is agreeable to admission.  [AJ]      ED Course User Index  [AJ] Abebe Garzon DO                                             Medical Decision Making  Differential diagnosis includes: Sepsis, UTI, dehydration, osteomyelitis of the foot, DKA    Problems Addressed:  Atrial fibrillation with RVR: complicated acute illness or injury  Confusion: complicated acute illness or injury  Diabetic ketoacidosis without coma associated with type 2 diabetes mellitus: complicated acute illness or injury  Elevated troponin: complicated acute illness or injury  Osteomyelitis of foot, unspecified laterality, unspecified type: complicated acute illness or injury    Amount and/or Complexity of Data Reviewed  Labs: ordered.     Details: CBC CMP  lactic  Radiology: ordered.     Details: CT head chest x-ray left foot x-ray  ECG/medicine tests: ordered.    Risk  OTC drugs.  Prescription drug management.  Decision regarding hospitalization.        Final diagnoses:   Osteomyelitis of foot, unspecified laterality, unspecified type   Diabetic ketoacidosis without coma associated with type 2 diabetes mellitus   Elevated troponin   Atrial fibrillation with RVR   Confusion       ED Disposition  ED Disposition       ED Disposition   Decision to Admit    Condition   --    Comment   Level of Care: Critical Care [6]   Diagnosis: DKA, type 2, not at goal [911022]   Admitting Physician: SUDEEP GONZALEZ [[id number]]   Certification: I Certify That Inpatient Hospital Services Are Medically Necessary For Greater Than 2 Midnights                 No follow-up provider specified.       Medication List      No changes were made to your prescriptions during this visit.            Abebe Garzon DO  07/31/24 1850       Abebe Garzon DO  07/31/24 2120

## 2024-08-01 ENCOUNTER — APPOINTMENT (OUTPATIENT)
Dept: ULTRASOUND IMAGING | Facility: HOSPITAL | Age: 69
DRG: 871 | End: 2024-08-01
Payer: COMMERCIAL

## 2024-08-01 LAB
ANION GAP SERPL CALCULATED.3IONS-SCNC: 14 MMOL/L (ref 5–15)
BACTERIA BLD CULT: ABNORMAL
BASOPHILS # BLD AUTO: 0.04 10*3/MM3 (ref 0–0.2)
BASOPHILS NFR BLD AUTO: 0.3 % (ref 0–1.5)
BOTTLE TYPE: ABNORMAL
BUN SERPL-MCNC: 50 MG/DL (ref 8–23)
BUN SERPL-MCNC: 51 MG/DL (ref 8–23)
BUN SERPL-MCNC: 52 MG/DL (ref 8–23)
BUN/CREAT SERPL: 21.1 (ref 7–25)
BUN/CREAT SERPL: 21.3 (ref 7–25)
BUN/CREAT SERPL: 21.9 (ref 7–25)
CALCIUM SPEC-SCNC: 8.7 MG/DL (ref 8.6–10.5)
CALCIUM SPEC-SCNC: 8.8 MG/DL (ref 8.6–10.5)
CALCIUM SPEC-SCNC: 8.8 MG/DL (ref 8.6–10.5)
CHLORIDE SERPL-SCNC: 103 MMOL/L (ref 98–107)
CO2 SERPL-SCNC: 19 MMOL/L (ref 22–29)
CO2 SERPL-SCNC: 21 MMOL/L (ref 22–29)
CO2 SERPL-SCNC: 21 MMOL/L (ref 22–29)
CREAT SERPL-MCNC: 2.28 MG/DL (ref 0.76–1.27)
CREAT SERPL-MCNC: 2.39 MG/DL (ref 0.76–1.27)
CREAT SERPL-MCNC: 2.46 MG/DL (ref 0.76–1.27)
D-LACTATE SERPL-SCNC: 1.3 MMOL/L (ref 0.5–2)
DEPRECATED RDW RBC AUTO: 44.3 FL (ref 37–54)
EGFRCR SERPLBLD CKD-EPI 2021: 27.8 ML/MIN/1.73
EGFRCR SERPLBLD CKD-EPI 2021: 28.8 ML/MIN/1.73
EGFRCR SERPLBLD CKD-EPI 2021: 30.5 ML/MIN/1.73
EOSINOPHIL # BLD AUTO: 0.03 10*3/MM3 (ref 0–0.4)
EOSINOPHIL NFR BLD AUTO: 0.2 % (ref 0.3–6.2)
ERYTHROCYTE [DISTWIDTH] IN BLOOD BY AUTOMATED COUNT: 14.5 % (ref 12.3–15.4)
GLUCOSE BLDC GLUCOMTR-MCNC: 140 MG/DL (ref 70–130)
GLUCOSE BLDC GLUCOMTR-MCNC: 188 MG/DL (ref 70–130)
GLUCOSE BLDC GLUCOMTR-MCNC: 242 MG/DL (ref 70–130)
GLUCOSE BLDC GLUCOMTR-MCNC: 319 MG/DL (ref 70–130)
GLUCOSE BLDC GLUCOMTR-MCNC: 338 MG/DL (ref 70–130)
GLUCOSE BLDC GLUCOMTR-MCNC: 353 MG/DL (ref 70–130)
GLUCOSE BLDC GLUCOMTR-MCNC: 360 MG/DL (ref 70–130)
GLUCOSE SERPL-MCNC: 370 MG/DL (ref 65–99)
GLUCOSE SERPL-MCNC: 370 MG/DL (ref 65–99)
GLUCOSE SERPL-MCNC: 379 MG/DL (ref 65–99)
HCT VFR BLD AUTO: 28.3 % (ref 37.5–51)
HGB BLD-MCNC: 9 G/DL (ref 13–17.7)
IMM GRANULOCYTES # BLD AUTO: 0.08 10*3/MM3 (ref 0–0.05)
IMM GRANULOCYTES NFR BLD AUTO: 0.6 % (ref 0–0.5)
IRON 24H UR-MRATE: 10 MCG/DL (ref 59–158)
IRON SATN MFR SERPL: 4 % (ref 20–50)
LYMPHOCYTES # BLD AUTO: 1.29 10*3/MM3 (ref 0.7–3.1)
LYMPHOCYTES NFR BLD AUTO: 10.1 % (ref 19.6–45.3)
MAGNESIUM SERPL-MCNC: 2.4 MG/DL (ref 1.6–2.4)
MAGNESIUM SERPL-MCNC: 2.5 MG/DL (ref 1.6–2.4)
MCH RBC QN AUTO: 26.6 PG (ref 26.6–33)
MCHC RBC AUTO-ENTMCNC: 31.8 G/DL (ref 31.5–35.7)
MCV RBC AUTO: 83.7 FL (ref 79–97)
MONOCYTES # BLD AUTO: 1.23 10*3/MM3 (ref 0.1–0.9)
MONOCYTES NFR BLD AUTO: 9.6 % (ref 5–12)
MRSA DNA SPEC QL NAA+PROBE: NORMAL
NEUTROPHILS NFR BLD AUTO: 10.16 10*3/MM3 (ref 1.7–7)
NEUTROPHILS NFR BLD AUTO: 79.2 % (ref 42.7–76)
NRBC BLD AUTO-RTO: 0 /100 WBC (ref 0–0.2)
PHOSPHATE SERPL-MCNC: 3.5 MG/DL (ref 2.5–4.5)
PHOSPHATE SERPL-MCNC: 3.6 MG/DL (ref 2.5–4.5)
PLATELET # BLD AUTO: 176 10*3/MM3 (ref 140–450)
PMV BLD AUTO: 12.7 FL (ref 6–12)
POTASSIUM SERPL-SCNC: 4 MMOL/L (ref 3.5–5.2)
POTASSIUM SERPL-SCNC: 4.1 MMOL/L (ref 3.5–5.2)
POTASSIUM SERPL-SCNC: 4.1 MMOL/L (ref 3.5–5.2)
PROT ?TM UR-MCNC: 84.4 MG/DL
PTH-INTACT SERPL-MCNC: 28.3 PG/ML (ref 15–65)
RBC # BLD AUTO: 3.38 10*6/MM3 (ref 4.14–5.8)
SODIUM SERPL-SCNC: 136 MMOL/L (ref 136–145)
SODIUM SERPL-SCNC: 138 MMOL/L (ref 136–145)
SODIUM SERPL-SCNC: 138 MMOL/L (ref 136–145)
SODIUM UR-SCNC: 34 MMOL/L
TIBC SERPL-MCNC: 276 MCG/DL (ref 298–536)
TRANSFERRIN SERPL-MCNC: 185 MG/DL (ref 200–360)
WBC NRBC COR # BLD AUTO: 12.83 10*3/MM3 (ref 3.4–10.8)

## 2024-08-01 PROCEDURE — 63710000001 INSULIN GLARGINE PER 5 UNITS: Performed by: INTERNAL MEDICINE

## 2024-08-01 PROCEDURE — 82570 ASSAY OF URINE CREATININE: CPT | Performed by: INTERNAL MEDICINE

## 2024-08-01 PROCEDURE — 63710000001 INSULIN REGULAR HUMAN PER 5 UNITS: Performed by: INTERNAL MEDICINE

## 2024-08-01 PROCEDURE — 87205 SMEAR GRAM STAIN: CPT | Performed by: INTERNAL MEDICINE

## 2024-08-01 PROCEDURE — 84100 ASSAY OF PHOSPHORUS: CPT | Performed by: INTERNAL MEDICINE

## 2024-08-01 PROCEDURE — 83970 ASSAY OF PARATHORMONE: CPT | Performed by: INTERNAL MEDICINE

## 2024-08-01 PROCEDURE — 25010000002 PIPERACILLIN SOD-TAZOBACTAM PER 1 G

## 2024-08-01 PROCEDURE — 82948 REAGENT STRIP/BLOOD GLUCOSE: CPT

## 2024-08-01 PROCEDURE — 63710000001 INSULIN REGULAR HUMAN PER 5 UNITS

## 2024-08-01 PROCEDURE — 80048 BASIC METABOLIC PNL TOTAL CA: CPT | Performed by: INTERNAL MEDICINE

## 2024-08-01 PROCEDURE — 25010000002 ENOXAPARIN PER 10 MG: Performed by: INTERNAL MEDICINE

## 2024-08-01 PROCEDURE — 25010000002 VANCOMYCIN 10 G RECONSTITUTED SOLUTION

## 2024-08-01 PROCEDURE — 83540 ASSAY OF IRON: CPT | Performed by: INTERNAL MEDICINE

## 2024-08-01 PROCEDURE — 25810000003 SODIUM CHLORIDE 0.9 % SOLUTION: Performed by: INTERNAL MEDICINE

## 2024-08-01 PROCEDURE — 99222 1ST HOSP IP/OBS MODERATE 55: CPT | Performed by: INTERNAL MEDICINE

## 2024-08-01 PROCEDURE — 25810000003 SODIUM CHLORIDE 0.9 % SOLUTION

## 2024-08-01 PROCEDURE — 76775 US EXAM ABDO BACK WALL LIM: CPT

## 2024-08-01 PROCEDURE — 85025 COMPLETE CBC W/AUTO DIFF WBC: CPT

## 2024-08-01 PROCEDURE — 83735 ASSAY OF MAGNESIUM: CPT | Performed by: INTERNAL MEDICINE

## 2024-08-01 PROCEDURE — 84466 ASSAY OF TRANSFERRIN: CPT | Performed by: INTERNAL MEDICINE

## 2024-08-01 PROCEDURE — 80048 BASIC METABOLIC PNL TOTAL CA: CPT

## 2024-08-01 PROCEDURE — 84300 ASSAY OF URINE SODIUM: CPT | Performed by: INTERNAL MEDICINE

## 2024-08-01 PROCEDURE — 84156 ASSAY OF PROTEIN URINE: CPT | Performed by: INTERNAL MEDICINE

## 2024-08-01 PROCEDURE — 87641 MR-STAPH DNA AMP PROBE: CPT

## 2024-08-01 PROCEDURE — 25810000003 LACTATED RINGERS SOLUTION

## 2024-08-01 PROCEDURE — 83605 ASSAY OF LACTIC ACID: CPT | Performed by: INTERNAL MEDICINE

## 2024-08-01 RX ORDER — VALSARTAN 40 MG/1
40 TABLET ORAL DAILY
Status: ON HOLD | COMMUNITY
End: 2024-08-01

## 2024-08-01 RX ORDER — LACTULOSE 10 G/15ML
30 SOLUTION ORAL 3 TIMES DAILY
Status: ON HOLD | COMMUNITY
End: 2024-08-01

## 2024-08-01 RX ORDER — AMOXICILLIN 250 MG
2 CAPSULE ORAL 2 TIMES DAILY
Status: DISCONTINUED | OUTPATIENT
Start: 2024-08-01 | End: 2024-08-12 | Stop reason: HOSPADM

## 2024-08-01 RX ORDER — PREGABALIN 100 MG/1
100 CAPSULE ORAL ONCE
Status: COMPLETED | OUTPATIENT
Start: 2024-08-01 | End: 2024-08-01

## 2024-08-01 RX ORDER — NICOTINE POLACRILEX 4 MG
15 LOZENGE BUCCAL
Status: DISCONTINUED | OUTPATIENT
Start: 2024-08-01 | End: 2024-08-12 | Stop reason: HOSPADM

## 2024-08-01 RX ORDER — CHLORHEXIDINE GLUCONATE 500 MG/1
1 CLOTH TOPICAL ONCE
Status: COMPLETED | OUTPATIENT
Start: 2024-08-01 | End: 2024-08-01

## 2024-08-01 RX ORDER — NITROGLYCERIN 0.4 MG/1
0.4 TABLET SUBLINGUAL
Status: DISCONTINUED | OUTPATIENT
Start: 2024-08-01 | End: 2024-08-12 | Stop reason: HOSPADM

## 2024-08-01 RX ORDER — SODIUM CHLORIDE 0.9 % (FLUSH) 0.9 %
10 SYRINGE (ML) INJECTION AS NEEDED
Status: DISCONTINUED | OUTPATIENT
Start: 2024-08-01 | End: 2024-08-12 | Stop reason: HOSPADM

## 2024-08-01 RX ORDER — SODIUM HYPOCHLORITE 1.25 MG/ML
SOLUTION TOPICAL EVERY 12 HOURS SCHEDULED
Status: DISCONTINUED | OUTPATIENT
Start: 2024-08-01 | End: 2024-08-09

## 2024-08-01 RX ORDER — CHLORHEXIDINE GLUCONATE 500 MG/1
1 CLOTH TOPICAL EVERY 24 HOURS
Status: DISCONTINUED | OUTPATIENT
Start: 2024-08-02 | End: 2024-08-02

## 2024-08-01 RX ORDER — PREGABALIN 100 MG/1
200 CAPSULE ORAL
COMMUNITY
End: 2024-08-12 | Stop reason: HOSPADM

## 2024-08-01 RX ORDER — SODIUM CHLORIDE 9 MG/ML
50 INJECTION, SOLUTION INTRAVENOUS CONTINUOUS
Status: DISCONTINUED | OUTPATIENT
Start: 2024-08-01 | End: 2024-08-06

## 2024-08-01 RX ORDER — SODIUM CHLORIDE 9 MG/ML
40 INJECTION, SOLUTION INTRAVENOUS AS NEEDED
Status: DISCONTINUED | OUTPATIENT
Start: 2024-08-01 | End: 2024-08-12 | Stop reason: HOSPADM

## 2024-08-01 RX ORDER — PREGABALIN 100 MG/1
100 CAPSULE ORAL DAILY
COMMUNITY
End: 2024-08-12 | Stop reason: HOSPADM

## 2024-08-01 RX ORDER — DEXTROSE MONOHYDRATE 25 G/50ML
25 INJECTION, SOLUTION INTRAVENOUS
Status: DISCONTINUED | OUTPATIENT
Start: 2024-08-01 | End: 2024-08-12 | Stop reason: HOSPADM

## 2024-08-01 RX ORDER — BISACODYL 10 MG
10 SUPPOSITORY, RECTAL RECTAL DAILY PRN
Status: DISCONTINUED | OUTPATIENT
Start: 2024-08-01 | End: 2024-08-12 | Stop reason: HOSPADM

## 2024-08-01 RX ORDER — POLYETHYLENE GLYCOL 3350 17 G/17G
17 POWDER, FOR SOLUTION ORAL DAILY PRN
Status: DISCONTINUED | OUTPATIENT
Start: 2024-08-01 | End: 2024-08-12 | Stop reason: HOSPADM

## 2024-08-01 RX ORDER — PREGABALIN 50 MG/1
50 CAPSULE ORAL DAILY
Status: DISCONTINUED | OUTPATIENT
Start: 2024-08-02 | End: 2024-08-12 | Stop reason: HOSPADM

## 2024-08-01 RX ORDER — OXYCODONE HYDROCHLORIDE 5 MG/1
10 TABLET ORAL 2 TIMES DAILY
Status: COMPLETED | OUTPATIENT
Start: 2024-08-01 | End: 2024-08-06

## 2024-08-01 RX ORDER — PREGABALIN 100 MG/1
100 CAPSULE ORAL NIGHTLY
Status: DISCONTINUED | OUTPATIENT
Start: 2024-08-02 | End: 2024-08-12 | Stop reason: HOSPADM

## 2024-08-01 RX ORDER — PREGABALIN 100 MG/1
100 CAPSULE ORAL EVERY MORNING
Status: ON HOLD | COMMUNITY
End: 2024-08-01

## 2024-08-01 RX ORDER — SODIUM CHLORIDE 0.9 % (FLUSH) 0.9 %
10 SYRINGE (ML) INJECTION EVERY 12 HOURS SCHEDULED
Status: DISCONTINUED | OUTPATIENT
Start: 2024-08-01 | End: 2024-08-12 | Stop reason: HOSPADM

## 2024-08-01 RX ORDER — BISACODYL 5 MG/1
5 TABLET, DELAYED RELEASE ORAL DAILY PRN
Status: DISCONTINUED | OUTPATIENT
Start: 2024-08-01 | End: 2024-08-12 | Stop reason: HOSPADM

## 2024-08-01 RX ADMIN — SODIUM CHLORIDE, POTASSIUM CHLORIDE, SODIUM LACTATE AND CALCIUM CHLORIDE 1000 ML: 600; 310; 30; 20 INJECTION, SOLUTION INTRAVENOUS at 01:53

## 2024-08-01 RX ADMIN — Medication 1 APPLICATION: at 01:53

## 2024-08-01 RX ADMIN — SODIUM HYPOCHLORITE: 1.25 SOLUTION TOPICAL at 11:24

## 2024-08-01 RX ADMIN — Medication 10 ML: at 08:29

## 2024-08-01 RX ADMIN — ENOXAPARIN SODIUM 135 MG: 150 INJECTION SUBCUTANEOUS at 20:53

## 2024-08-01 RX ADMIN — PREGABALIN 100 MG: 100 CAPSULE ORAL at 09:47

## 2024-08-01 RX ADMIN — Medication 1 APPLICATION: at 20:48

## 2024-08-01 RX ADMIN — Medication 10 ML: at 01:53

## 2024-08-01 RX ADMIN — INSULIN GLARGINE 10 UNITS: 100 INJECTION, SOLUTION SUBCUTANEOUS at 09:54

## 2024-08-01 RX ADMIN — PIPERACILLIN SODIUM AND TAZOBACTAM SODIUM 3.38 G: 3; .375 INJECTION, POWDER, LYOPHILIZED, FOR SOLUTION INTRAVENOUS at 01:53

## 2024-08-01 RX ADMIN — SODIUM CHLORIDE 75 ML/HR: 9 INJECTION, SOLUTION INTRAVENOUS at 12:10

## 2024-08-01 RX ADMIN — Medication 10 ML: at 20:48

## 2024-08-01 RX ADMIN — INSULIN HUMAN 5 UNITS: 100 INJECTION, SOLUTION PARENTERAL at 11:28

## 2024-08-01 RX ADMIN — PIPERACILLIN SODIUM AND TAZOBACTAM SODIUM 3.38 G: 3; .375 INJECTION, POWDER, LYOPHILIZED, FOR SOLUTION INTRAVENOUS at 09:48

## 2024-08-01 RX ADMIN — OXYCODONE HYDROCHLORIDE 10 MG: 5 TABLET ORAL at 20:48

## 2024-08-01 RX ADMIN — DOCUSATE SODIUM 50 MG AND SENNOSIDES 8.6 MG 2 TABLET: 8.6; 5 TABLET, FILM COATED ORAL at 20:48

## 2024-08-01 RX ADMIN — SODIUM CHLORIDE 750 MG: 9 INJECTION, SOLUTION INTRAVENOUS at 20:54

## 2024-08-01 RX ADMIN — INSULIN HUMAN 12 UNITS: 100 INJECTION, SOLUTION PARENTERAL at 02:17

## 2024-08-01 RX ADMIN — PIPERACILLIN SODIUM AND TAZOBACTAM SODIUM 3.38 G: 3; .375 INJECTION, POWDER, LYOPHILIZED, FOR SOLUTION INTRAVENOUS at 18:22

## 2024-08-01 RX ADMIN — ENOXAPARIN SODIUM 135 MG: 150 INJECTION SUBCUTANEOUS at 08:30

## 2024-08-01 RX ADMIN — INSULIN HUMAN 3 UNITS: 100 INJECTION, SOLUTION PARENTERAL at 17:13

## 2024-08-01 RX ADMIN — INSULIN HUMAN 10 UNITS: 100 INJECTION, SOLUTION PARENTERAL at 05:59

## 2024-08-01 RX ADMIN — SODIUM HYPOCHLORITE: 1.25 SOLUTION TOPICAL at 20:48

## 2024-08-01 RX ADMIN — Medication 1 APPLICATION: at 09:54

## 2024-08-01 RX ADMIN — CHLORHEXIDINE GLUCONATE 1 APPLICATION: 500 CLOTH TOPICAL at 01:54

## 2024-08-01 RX ADMIN — DOCUSATE SODIUM 50 MG AND SENNOSIDES 8.6 MG 2 TABLET: 8.6; 5 TABLET, FILM COATED ORAL at 08:29

## 2024-08-01 NOTE — ED NOTES
The following fall interventions were initiated:    [x] Patient and/or family given education   [x] Call light within reach and educated on how to use   [x] Bed rails up per protocol    [x] Bed locked and in the lowest position   [x] Bed alarm set and on loudest setting   [x] Fall wrist band applied   [] Non skid footwear applied   [x] Room free of clutter   [x] Patient items within reach   [x] Adequate lighting provided  [x] Falls sign present    [x] Patient moved closer to nursing station   [] Restraints applied

## 2024-08-01 NOTE — PLAN OF CARE
Problem: Fall Injury Risk  Goal: Absence of Fall and Fall-Related Injury  Outcome: Ongoing, Progressing  Intervention: Identify and Manage Contributors  Recent Flowsheet Documentation  Taken 8/1/2024 1600 by Tahira Napoles RN  Medication Review/Management: medications reviewed  Taken 8/1/2024 1200 by Tahira Napoles RN  Medication Review/Management: medications reviewed  Taken 8/1/2024 0900 by Tahira Napoles RN  Medication Review/Management: medications reviewed  Taken 8/1/2024 0800 by Tahira Napoles RN  Medication Review/Management: medications reviewed  Intervention: Promote Injury-Free Environment  Recent Flowsheet Documentation  Taken 8/1/2024 1600 by Tahira Napoles RN  Safety Promotion/Fall Prevention:   room organization consistent   safety round/check completed  Taken 8/1/2024 1200 by Tahira Napoles RN  Safety Promotion/Fall Prevention:   room organization consistent   safety round/check completed  Taken 8/1/2024 1100 by Tahira Napoles RN  Safety Promotion/Fall Prevention:   room organization consistent   safety round/check completed  Taken 8/1/2024 1000 by Tahira Napoles RN  Safety Promotion/Fall Prevention:   room organization consistent   safety round/check completed  Taken 8/1/2024 0900 by Tahira Napoles RN  Safety Promotion/Fall Prevention:   room organization consistent   safety round/check completed  Taken 8/1/2024 0800 by Tahira Napoles RN  Safety Promotion/Fall Prevention:   room organization consistent   safety round/check completed  Taken 8/1/2024 0700 by Tahira Napoles RN  Safety Promotion/Fall Prevention:   safety round/check completed   room organization consistent   Goal Outcome Evaluation:  Plan of Care Reviewed With: patient, spouse        Progress: improving  Outcome Evaluation: pt with vss. remains off insulin gtt. long acting insulin given and blood sugars are much better. pt aox4. see wound to left foot and wound care. ID consulted today and  following aware of blood cultures. no fevers. good urine output. ivf cont. cont abx. pt with diet started but no appetite.

## 2024-08-01 NOTE — CONSULTS
"   Julia Adrian MD  Physician  Infectious Disease     Progress Notes     Addendum     Date of Service: 08/01/24 0749  Creation Time: 08/01/24 0749     Expand All Collapse All    INFECTIOUS DISEASES CONSULT NOTE     Patient:  Erick Luong 69 y.o. male  ROOM # C004/1  YOB: 1955  MRN: [id number]  CSN:    [id number]  Admit date: 7/31/2024             Admitting Physician: Keren Jamison MD  Primary Care Physician: Del Shetty MD  REFERRING PROVIDER: Abebe Garzon DO     Consults     REASON FOR CONSULTATION :hx MRSA bactermia/ESBL, DC ulcer         HISTORY OF PRESENT ILLNESS: The patient is a 61-year-old uncontrolled diabetic male who have seen previously with diabetic foot infection.  He primarily gets his podiatry care with Dr. Gomes in [place].     I last saw him in May as a follow-up for MRSA bacteremia secondary to infected left foot and associated cellulitis.     He says he normally sees Dr. Gomes every Monday but did not go last Monday but could not tell me why.     Patient was apparently confused and found wandering in his garage by his son.  In the emergency department he was found to have his wounds on his left foot caked with hair and dirt.     Patient also tells me his glucose has been out of control \"for a while\" and when I tried to pin him down he said for about a month.  Glucose was 700 in the emergency department.     He is remained hemodynamically stable in the CCU and has been given Zosyn and vancomycin.  Patient denies any dysuria.  He does state he has urinary frequency but that that is not uncommon.  He did complain of some abdominal pain and CT scan of the abdomen pelvis has been completed.        Medical History        Past Medical History:   Diagnosis Date    Arthritis      Autonomic disease      CAD (coronary artery disease) 02/06/2017    Cervical radiculopathy 09/16/2021    Chronic constipation with acute exaccerbation 05/10/2021    Coronary artery " disease      Degeneration of cervical intervertebral disc 08/11/2021    Diabetes mellitus      Diabetic foot ulcer 08/31/2020    Diabetic polyneuropathy associated with type 2 diabetes mellitus 01/18/2021    Elevated cholesterol      Gastroesophageal reflux disease 05/13/2019    Headache      HTN (hypertension), benign 05/03/2017    Hyperlipidemia      Hypertension      Mixed hyperlipidemia 02/07/2017    Multiple lung nodules 01/26/2020     5mm, 9 mm RLL identified 1/2020, not present 10/2019.    Myocardial infarction      Osteomyelitis 01/22/2020    Osteomyelitis of fifth toe of right foot 10/07/2019    Pancreatitis      Persistent insomnia 01/20/2020    Renal disorder      Sleep apnea 02/06/2017    Sleep apnea with use of continuous positive airway pressure (CPAP)       NON-COMPLIANT    Slow transit constipation 01/16/2019    Spinal stenosis in cervical region 09/16/2021    Vitamin D deficiency 03/02/2021         Surgical History         Past Surgical History:   Procedure Laterality Date    ABDOMINAL SURGERY        AMPUTATION FOOT / TOE Left 10/2021     5th digit     ANTERIOR CERVICAL DISCECTOMY W/ FUSION N/A 08/05/2022     Procedure: CERVICAL DISCECTOMY ANTERIOR WITH FUSION C5-6 with possible plating of C5-7 with neuromonitoring and 1 c-arm;  Surgeon: Karel Soliz MD;  Location:  [facility] OR;  Service: Neurosurgery;  Laterality: N/A;    APPENDECTOMY        BACK SURGERY        CARDIAC CATHETERIZATION Left 02/08/2021     Procedure: Left Heart Cath w poss intervention left anatomical snuff box acess;  Surgeon: Omkar Charles DO;  Location:  [facility] CATH INVASIVE LOCATION;  Service: Cardiology;  Laterality: Left;    CARDIAC SURGERY        CATARACT EXTRACTION        CERVICAL SPINE SURGERY        COLONOSCOPY N/A 01/31/2017     Normal exam repeat in 5 years    COLONOSCOPY N/A 02/11/2019     Mild acute inflammation    COLONOSCOPY N/A 04/07/2024     2 areas at 10 and 20 cm with friability ulceration 2 clips  placed at 20 cm and 4 clips at 10 cm poor prep normal mucosa,mild eroisions and ulcerations in visible vessels    COLONOSCOPY N/A 7/1/2024     Procedure: COLONOSCOPY WITH ANESTHESIA;  Surgeon: Arsalan Lorenzo DO;  Location: [facility] ENDOSCOPY;  Service: Gastroenterology;  Laterality: N/A;  pre op constipation/diarrhea  post poor prep  pcp Del Shetty    COLONOSCOPY N/A 7/2/2024     Procedure: COLONOSCOPY WITH ANESTHESIA;  Surgeon: Agapito Christopher MD;  Location: [facility] ENDOSCOPY;  Service: Gastroenterology;  Laterality: N/A;  pre rectal bleeding  post poor prep  pcp Del Shetty MD    COLONOSCOPY W/ POLYPECTOMY   03/04/2014     Hyperplastic polyp    CORONARY ARTERY BYPASS GRAFT   10/2015    ENDOSCOPY   04/13/2011     Gastritis with hemorrhage    ENDOSCOPY N/A 05/05/2017     Normal exam    ENDOSCOPY N/A 02/11/2019     Gastritis    ENDOSCOPY N/A 09/01/2020     Non-erosive gastritis with hemorrhage    ENDOSCOPY N/A 02/10/2021     Esophagitis    ENDOSCOPY N/A 04/11/2024     There were esophageal mucosal changes suspicious for short-segment Low's esophagus present in the distal esophagus. The maximum longitudinal extent of these mucosal changes was 2 cm in length. Mucosa was biopsied with a cold forceps for histologyDistal esophagus, biopsies: Mild chronic active esophagogastritis. No evidence of intestinal metaplasia, dysplasia. Antrum, bx, Mild chronic gastritis    FOOT SURGERY Left      INCISION AND DRAINAGE OF WOUND Left 09/2007     spider bite               Family History   Problem Relation Age of Onset    Colon cancer Father      Heart disease Father      Colon cancer Sister      Colon polyps Sister      Alzheimer's disease Mother      Coronary artery disease Sister      Coronary artery disease Sister        Social History   Social History            Socioeconomic History    Marital status:    Tobacco Use    Smoking status: Former       Current packs/day: 0.00       Types: Cigarettes       Quit  date:        Years since quittin.6    Smokeless tobacco: Never    Tobacco comments:       smoked in highschool   Vaping Use    Vaping status: Never Used   Substance and Sexual Activity    Alcohol use: No    Drug use: No    Sexual activity: Defer            Current Scheduled Medications:     Scheduled Medication   [START ON 2024] Chlorhexidine Gluconate Cloth, 1 Application, Topical, Q24H  enoxaparin, 1 mg/kg, Subcutaneous, Q12H  insulin regular, 3-14 Units, Subcutaneous, Q6H  mupirocin, 1 Application, Each Nare, BID  piperacillin-tazobactam, 3.375 g, Intravenous, Q8H  senna-docusate sodium, 2 tablet, Oral, BID  sodium chloride, 10 mL, Intravenous, Q12H  sodium chloride, 10 mL, Intravenous, Q12H  vancomycin, 750 mg, Intravenous, Q24H                     Current PRN Medications:    PRN Medication     senna-docusate sodium **AND** polyethylene glycol **AND** bisacodyl **AND** bisacodyl    Calcium Replacement - Follow Nurse / BPA Driven Protocol    dextrose    dextrose    dextrose    dextrose 5 % and sodium chloride 0.45 %    dextrose 5 % and sodium chloride 0.45 % with KCl 20 mEq/L    dextrose 5 % and sodium chloride 0.45 % with KCl 40 mEq/L    dextrose 5 % and sodium chloride 0.9 %    dextrose 5 % and sodium chloride 0.9 % with KCl 20 mEq    dextrose 5% and sodium chloride 0.9% with KCl 40 mEq/L    glucagon (human recombinant)    Magnesium Standard Dose Replacement - Follow Nurse / BPA Driven Protocol    nitroglycerin    Pharmacy to Dose enoxaparin (LOVENOX)    Pharmacy to dose vancomycin    Pharmacy to Dose Zosyn    Phosphorus Replacement - Follow Nurse / BPA Driven Protocol    Potassium Replacement - Follow Nurse / BPA Driven Protocol    sodium chloride 0.45 % 1,000 mL with potassium chloride 40 mEq infusion    sodium chloride    sodium chloride 0.45 % with KCl 20 mEq    sodium chloride    sodium chloride    sodium chloride    sodium chloride    sodium chloride    sodium chloride 0.9 % with KCl 20 mEq    " sodium chloride 0.9 % with KCl 40 mEq/L             Infusion Medications   dextrose 5 % and sodium chloride 0.45 %, 150 mL/hr  dextrose 5 % and sodium chloride 0.45 % with KCl 20 mEq/L, 150 mL/hr  dextrose 5 % and sodium chloride 0.45 % with KCl 40 mEq/L, 150 mL/hr  dextrose 5 % and sodium chloride 0.9 %, 150 mL/hr  dextrose 5 % and sodium chloride 0.9 % with KCl 20 mEq, 150 mL/hr  dextrose 5% and sodium chloride 0.9% with KCl 40 mEq/L, 150 mL/hr  dilTIAZem, 5-15 mg/hr, Last Rate: Stopped (24)  Pharmacy to Dose enoxaparin (LOVENOX),   Pharmacy to dose vancomycin,   Pharmacy to Dose Zosyn,   sodium chloride 0.45 % 1,000 mL with potassium chloride 40 mEq infusion, 250 mL/hr  sodium chloride, 250 mL/hr  sodium chloride 0.45 % with KCl 20 mEq, 250 mL/hr, Last Rate: Stopped (24)  sodium chloride, 250 mL/hr  sodium chloride 0.9 % with KCl 20 mEq, 250 mL/hr  sodium chloride 0.9 % with KCl 40 mEq/L, 250 mL/hr                  Allergies         Allergies   Allergen Reactions    Cefepime Hives and Anaphylaxis    Bactrim [Sulfamethoxazole-Trimethoprim] Other (See Comments)       \"RENAL FAILURE\"    Vancomycin Itching    Zolpidem Mental Status Change       \"makes him crazy\"    Metronidazole Rash                  Vital Signs:  /75   Pulse 77   Temp 97.6 °F (36.4 °C) (Oral)   Resp 20   Ht 182.9 cm (72\")   Wt 130 kg (286 lb 13.1 oz)   SpO2 100%   BMI 38.90 kg/m²   Temp (24hrs), Av.1 °F (36.7 °C), Min:97.6 °F (36.4 °C), Max:99.5 °F (37.5 °C)        Physical Exam  General: Patient is a 69-year-old gentleman lying in bed appearing in no acute distress  HEENT: Sclera anicteric.  There is no conjunctival petechiae  Heart: Irregularly irregular  Lungs: Clear bilaterally  Abdomen: Soft, no rebound or guarding.  No mass appreciated  Extremities: See photos.  Patient does have areas of excoriations and some eschar formation on both toes.  Patient does have a large ulcer as seen below on photo however " he also has an area of pale nonviable appearing skin.  I do not appreciate any necrosis.  I did not palpate any fluctuance or crepitus.                Results Review:    I reviewed the patient's new clinical results.     Lab Results:     CBC:   Lab Results  Lab 07/25/24 1018 07/31/24 1849 08/01/24 0419  WBC 8.92 15.48* 12.83*  HEMOGLOBIN 11.2* 9.8* 9.0*  HEMATOCRIT 34.9* 29.6* 28.3*  PLATELETS 223 201 176        AutoDiff:   Lab Results  Lab 07/25/24 1018 07/31/24 1849 08/01/24 0419  NEUTROPHIL % 55.0 88.7* 79.2*  LYMPHOCYTE % 28.4 2.9* 10.1*  MONOCYTES % 8.7 7.1 9.6  EOSINOPHIL % 7.0* 0.1* 0.2*  BASOPHIL % 0.7 0.3 0.3  NEUTROS ABS 4.91 13.73* 10.16*  LYMPHS ABS 2.53 0.45* 1.29  MONOS ABS 0.78 1.10* 1.23*  EOS ABS 0.62* 0.01 0.03  BASOS ABS 0.06 0.05 0.04        Manual Diff:    Lab Results  Lab 07/25/24 1018 07/31/24 1849 08/01/24 0419  NEUTROS ABS 4.91 13.73* 10.16*        CMP:   Lab Results  Lab 07/25/24  1018 07/31/24 1849 07/31/24  2030 08/01/24  0021 08/01/24  0210 08/01/24 0419  SODIUM 144 132*   < > 138 136 138  POTASSIUM 3.4* 4.5   < > 4.1 4.0 4.1  CHLORIDE 106 97*   < > 103 103 103  CO2 27.0 19.0*   < > 21.0* 19.0* 21.0*  BUN 43* 55*   < > 52* 51* 50*  CREATININE 2.24* 2.67*   < > 2.46* 2.39* 2.28*  CALCIUM 9.2 9.0   < > 8.8 8.7 8.8  BILIRUBIN 0.3 0.6  --   --   --   --   ALK PHOS 84 101  --   --   --   --   ALT (SGPT) 7 8  --   --   --   --   AST (SGOT) 11 11  --   --   --   --   GLUCOSE 59* 704*   < > 370* 379* 370*   < > = values in this interval not displayed.        Lab Results (last 72 hours)        Procedure Component Value Units Date/Time    POC Glucose Once [[id number]]  (Abnormal) Collected: 08/01/24 0556    Specimen: Blood Updated: 08/01/24 0607      Glucose 338 mg/dL          Comment: : [id number] Thee SamiMeter ID: [id number]      Basic Metabolic Panel [[id number]]  (Abnormal) Collected: 08/01/24 0419    Specimen: Blood Updated: 08/01/24 0515      Glucose 370 mg/dL         BUN 50 mg/dL        Creatinine 2.28 mg/dL        Sodium 138 mmol/L        Potassium 4.1 mmol/L        Chloride 103 mmol/L        CO2 21.0 mmol/L        Calcium 8.8 mg/dL        BUN/Creatinine Ratio 21.9      Anion Gap 14.0 mmol/L        eGFR 30.5 mL/min/1.73      Narrative:      GFR Normal >60  Chronic Kidney Disease <60  Kidney Failure <15       Magnesium [[id number]]  (Abnormal) Collected: 08/01/24 0419    Specimen: Blood Updated: 08/01/24 0515      Magnesium 2.5 mg/dL      Phosphorus [[id number]]  (Normal) Collected: 08/01/24 0419    Specimen: Blood Updated: 08/01/24 0512      Phosphorus 3.6 mg/dL      CBC & Differential [[id number]]  (Abnormal) Collected: 08/01/24 0419    Specimen: Blood Updated: 08/01/24 0452    Narrative:      The following orders were created for panel order CBC & Differential.  Procedure                               Abnormality         Status                     ---------                               -----------         ------                     CBC Auto Differential[[id number]]        Abnormal            Final result                  Please view results for these tests on the individual orders.    CBC Auto Differential [[id number]]  (Abnormal) Collected: 08/01/24 0419    Specimen: Blood Updated: 08/01/24 0452      WBC 12.83 10*3/mm3        RBC 3.38 10*6/mm3        Hemoglobin 9.0 g/dL        Hematocrit 28.3 %        MCV 83.7 fL        MCH 26.6 pg        MCHC 31.8 g/dL        RDW 14.5 %        RDW-SD 44.3 fl        MPV 12.7 fL        Platelets 176 10*3/mm3        Neutrophil % 79.2 %        Lymphocyte % 10.1 %        Monocyte % 9.6 %        Eosinophil % 0.2 %        Basophil % 0.3 %        Immature Grans % 0.6 %        Neutrophils, Absolute 10.16 10*3/mm3        Lymphocytes, Absolute 1.29 10*3/mm3        Monocytes, Absolute 1.23 10*3/mm3        Eosinophils, Absolute 0.03 10*3/mm3        Basophils, Absolute 0.04 10*3/mm3        Immature Grans, Absolute 0.08 10*3/mm3        nRBC 0.0 /100 WBC       MRSA Screen, PCR (Inpatient) - Swab, Nares [[id number]]  (Normal) Collected: 08/01/24 0206    Specimen: Swab from Nares Updated: 08/01/24 0346      MRSA PCR No MRSA Detected    Narrative:      The negative predictive value of this diagnostic test is high and should only be used to consider de-escalating anti-MRSA therapy. A positive result may indicate colonization with MRSA and must be correlated clinically.    Basic Metabolic Panel [[id number]]  (Abnormal) Collected: 08/01/24 0210    Specimen: Blood Updated: 08/01/24 0253      Glucose 379 mg/dL        BUN 51 mg/dL        Creatinine 2.39 mg/dL        Sodium 136 mmol/L        Potassium 4.0 mmol/L        Chloride 103 mmol/L        CO2 19.0 mmol/L        Calcium 8.7 mg/dL        BUN/Creatinine Ratio 21.3      Anion Gap 14.0 mmol/L        eGFR 28.8 mL/min/1.73      Narrative:      GFR Normal >60  Chronic Kidney Disease <60  Kidney Failure <15       STAT Lactic Acid, Reflex [[id number]]  (Normal) Collected: 08/01/24 0210    Specimen: Blood Updated: 08/01/24 0250      Lactate 1.3 mmol/L      POC Glucose Once [[id number]]  (Abnormal) Collected: 08/01/24 0210    Specimen: Blood Updated: 08/01/24 0221      Glucose 360 mg/dL          Comment: : [id number] Thee SamiMeter ID: [id number]      Basic Metabolic Panel [[id number]]  (Abnormal) Collected: 08/01/24 0021    Specimen: Blood Updated: 08/01/24 0053      Glucose 370 mg/dL        BUN 52 mg/dL        Creatinine 2.46 mg/dL        Sodium 138 mmol/L        Potassium 4.1 mmol/L          Comment: Specimen hemolyzed.  Result may be falsely elevated.        Chloride 103 mmol/L        CO2 21.0 mmol/L        Calcium 8.8 mg/dL        BUN/Creatinine Ratio 21.1      Anion Gap 14.0 mmol/L        eGFR 27.8 mL/min/1.73      Narrative:      GFR Normal >60  Chronic Kidney Disease <60  Kidney Failure <15       Phosphorus [[id number]]  (Normal) Collected: 08/01/24 0021    Specimen: Blood Updated: 08/01/24 0043      Phosphorus 3.5 mg/dL       Magnesium [[id number]]  (Normal) Collected: 08/01/24 0021    Specimen: Blood Updated: 08/01/24 0042      Magnesium 2.4 mg/dL      POC Glucose Once [[id number]]  (Abnormal) Collected: 08/01/24 0010    Specimen: Blood Updated: 08/01/24 0022      Glucose 353 mg/dL          Comment: : [id number] Thee SamiMeter ID: [id number]      POC Glucose Once [[id number]]  (Abnormal) Collected: 07/31/24 2323    Specimen: Blood Updated: 07/31/24 2334      Glucose 237 mg/dL          Comment: : [id number] Thee SamiMeter ID: [id number]      STAT Lactic Acid, Reflex [[id number]]  (Abnormal) Collected: 07/31/24 2240    Specimen: Blood Updated: 07/31/24 2300      Lactate 2.2 mmol/L      POC Glucose Once [[id number]]  (Abnormal) Collected: 07/31/24 2238    Specimen: Blood Updated: 07/31/24 2250      Glucose 369 mg/dL          Comment: : [id number] Thee SamiMeter ID: [id number]      POC Glucose Once [[id number]]  (Abnormal) Collected: 07/31/24 2124    Specimen: Blood Updated: 07/31/24 2136      Glucose 543 mg/dL          Comment: : [id number] Zafar AbigailMeter ID: [id number]      Basic Metabolic Panel [[id number]]  (Abnormal) Collected: 07/31/24 2030    Specimen: Blood Updated: 07/31/24 2111      Glucose 653 mg/dL        BUN 54 mg/dL        Creatinine 2.64 mg/dL        Sodium 133 mmol/L        Potassium 4.4 mmol/L        Chloride 98 mmol/L        CO2 20.0 mmol/L        Calcium 8.9 mg/dL        BUN/Creatinine Ratio 20.5      Anion Gap 15.0 mmol/L        eGFR 25.6 mL/min/1.73      Narrative:      GFR Normal >60  Chronic Kidney Disease <60  Kidney Failure <15       Phosphorus [[id number]]  (Normal) Collected: 07/31/24 1849    Specimen: Blood Updated: 07/31/24 2110      Phosphorus 2.7 mg/dL      Magnesium [[id number]]  (Normal) Collected: 07/31/24 1849    Specimen: Blood Updated: 07/31/24 2110      Magnesium 2.2 mg/dL      Osmolality, Serum [[id number]]  (Abnormal) Collected: 07/31/24 2030    Specimen: Blood Updated: 07/31/24  2103      Osmolality 329 mOsm/kg      High Sensitivity Troponin T 2Hr [[id number]]  (Abnormal) Collected: 07/31/24 2030    Specimen: Blood Updated: 07/31/24 2058      HS Troponin T 353 ng/L        Troponin T Delta 11 ng/L      Narrative:      High Sensitive Troponin T Reference Range:  <14.0 ng/L- Negative Female for AMI  <22.0 ng/L- Negative Male for AMI  >=14 - Abnormal Female indicating possible myocardial injury.  >=22 - Abnormal Male indicating possible myocardial injury.   Clinicians would have to utilize clinical acumen, EKG, Troponin, and serial changes to determine if it is an Acute Myocardial Infarction or myocardial injury due to an underlying chronic condition.          Magnesium [[id number]]  (Normal) Collected: 07/31/24 2030    Specimen: Blood Updated: 07/31/24 2056      Magnesium 2.3 mg/dL      Phosphorus [[id number]]  (Normal) Collected: 07/31/24 2030    Specimen: Blood Updated: 07/31/24 2054      Phosphorus 3.1 mg/dL      POC Glucose Once [[id number]]  (Abnormal) Collected: 07/31/24 2021    Specimen: Blood Updated: 07/31/24 2033      Glucose 575 mg/dL          Comment: : [id number] [facility] [id number]      Blood Gas, Venous - [[id number]]  (Abnormal) Collected: 07/31/24 2021    Specimen: Venous Blood Updated: 07/31/24 2021      Site OTHER      pH, Venous 7.410 pH Units        pCO2, Venous 32.2 mm Hg          Comment: 84 Value below reference range        pO2, Venous 55.6 mm Hg          Comment: 83 Value above reference range        HCO3, Venous 20.4 mmol/L          Comment: 84 Value below reference range        Base Excess, Venous -3.6 mmol/L          Comment: 84 Value below reference range        O2 Saturation, Venous 89.9 %          Comment: 83 Value above reference range        Temperature 37.0      Barometric Pressure for Blood Gas 751 mmHg        Modality Room Air      FIO2 21 %        Ventilator Mode NA      Collected by [id number]        Comment: Meter: [id number]      :  [email or web address]      Hemoglobin A1c [[id number]]  (Abnormal) Collected: 07/31/24 1849    Specimen: Blood Updated: 07/31/24 2004      Hemoglobin A1C 12.60 %      Narrative:      Hemoglobin A1C Ranges:     Increased Risk for Diabetes  5.7% to 6.4%  Diabetes                     >= 6.5%  Diabetic Goal                < 7.0%    Comprehensive Metabolic Panel [[id number]]  (Abnormal) Collected: 07/31/24 1849    Specimen: Blood Updated: 07/31/24 1944      Glucose 704 mg/dL        BUN 55 mg/dL        Creatinine 2.67 mg/dL        Sodium 132 mmol/L        Potassium 4.5 mmol/L        Chloride 97 mmol/L        CO2 19.0 mmol/L        Calcium 9.0 mg/dL        Total Protein 6.7 g/dL        Albumin 3.5 g/dL        ALT (SGPT) 8 U/L        AST (SGOT) 11 U/L        Alkaline Phosphatase 101 U/L        Total Bilirubin 0.6 mg/dL        Globulin 3.2 gm/dL        A/G Ratio 1.1 g/dL        BUN/Creatinine Ratio 20.6      Anion Gap 16.0 mmol/L        eGFR 25.2 mL/min/1.73      Narrative:      GFR Normal >60  Chronic Kidney Disease <60  Kidney Failure <15       C-reactive Protein [[id number]]  (Abnormal) Collected: 07/31/24 1849    Specimen: Blood Updated: 07/31/24 1932      C-Reactive Protein 17.04 mg/dL      High Sensitivity Troponin T [[id number]]  (Abnormal) Collected: 07/31/24 1849    Specimen: Blood Updated: 07/31/24 1929      HS Troponin T 342 ng/L      Narrative:      High Sensitive Troponin T Reference Range:  <14.0 ng/L- Negative Female for AMI  <22.0 ng/L- Negative Male for AMI  >=14 - Abnormal Female indicating possible myocardial injury.  >=22 - Abnormal Male indicating possible myocardial injury.   Clinicians would have to utilize clinical acumen, EKG, Troponin, and serial changes to determine if it is an Acute Myocardial Infarction or myocardial injury due to an underlying chronic condition.          Lactic Acid, Plasma [[id number]]  (Abnormal) Collected: 07/31/24 1849    Specimen: Blood Updated: 07/31/24 1928       Lactate 2.4 mmol/L      Urinalysis With Culture If Indicated - Straight Cath [[id number]]  (Abnormal) Collected: 07/31/24 1851    Specimen: Urine from Straight Cath Updated: 07/31/24 1924      Color, UA Yellow      Appearance, UA Clear      pH, UA <=5.0      Specific Gravity, UA 1.028      Glucose, UA >=1000 mg/dL (3+)      Ketones, UA Trace      Bilirubin, UA Negative      Blood, UA Trace      Protein,  mg/dL (2+)      Leuk Esterase, UA Negative      Nitrite, UA Positive      Urobilinogen, UA 0.2 E.U./dL    Narrative:      In absence of clinical symptoms, the presence of pyuria, bacteria, and/or nitrites on the urinalysis result does not correlate with infection.    Urinalysis, Microscopic Only - Straight Cath [[id number]]  (Abnormal) Collected: 07/31/24 1851    Specimen: Urine from Straight Cath Updated: 07/31/24 1924      RBC, UA 3-5 /HPF        WBC, UA 6-10 /HPF        Bacteria, UA 2+ /HPF        Squamous Epithelial Cells, UA 0-2 /HPF        Hyaline Casts, UA None Seen /LPF        Methodology Manual Light Microscopy    Urine Culture - Urine, Straight Cath [[id number]] Collected: 07/31/24 1851    Specimen: Urine from Straight Cath Updated: 07/31/24 1924    Acetone [[id number]]  (Abnormal) Collected: 07/31/24 1849    Specimen: Blood Updated: 07/31/24 1910      Acetone Small    Sedimentation Rate [[id number]]  (Abnormal) Collected: 07/31/24 1849    Specimen: Blood Updated: 07/31/24 1910      Sed Rate 57 mm/hr      CBC & Differential [[id number]]  (Abnormal) Collected: 07/31/24 1849    Specimen: Blood Updated: 07/31/24 1906    Narrative:      The following orders were created for panel order CBC & Differential.  Procedure                               Abnormality         Status                     ---------                               -----------         ------                     CBC Auto Differential[[id number]]        Abnormal            Final result                  Please view results for these tests on the  individual orders.    CBC Auto Differential [[id number]]  (Abnormal) Collected: 07/31/24 1849    Specimen: Blood Updated: 07/31/24 1906      WBC 15.48 10*3/mm3        RBC 3.55 10*6/mm3        Hemoglobin 9.8 g/dL        Hematocrit 29.6 %        MCV 83.4 fL        MCH 27.6 pg        MCHC 33.1 g/dL        RDW 14.5 %        RDW-SD 43.8 fl        MPV 13.1 fL        Platelets 201 10*3/mm3        Neutrophil % 88.7 %        Lymphocyte % 2.9 %        Monocyte % 7.1 %        Eosinophil % 0.1 %        Basophil % 0.3 %        Immature Grans % 0.9 %        Neutrophils, Absolute 13.73 10*3/mm3        Lymphocytes, Absolute 0.45 10*3/mm3        Monocytes, Absolute 1.10 10*3/mm3        Eosinophils, Absolute 0.01 10*3/mm3        Basophils, Absolute 0.05 10*3/mm3        Immature Grans, Absolute 0.14 10*3/mm3      Blood Culture - Blood, Hand, Left [[id number]] Collected: 07/31/24 1849    Specimen: Blood from Hand, Left Updated: 07/31/24 1902    Blood Culture - Blood, Arm, Left [[id number]] Collected: 07/31/24 1849    Specimen: Blood from Arm, Left Updated: 07/31/24 1902              Estimated Creatinine Clearance: 43.2 mL/min (A) (by C-G formula based on SCr of 2.28 mg/dL (H)).     Culture Results:     Microbiology Results (last 10 days)        Procedure Component Value - Date/Time    MRSA Screen, PCR (Inpatient) - Swab, Nares [[id number]]  (Normal) Collected: 08/01/24 0206    Lab Status: Final result Specimen: Swab from Nares Updated: 08/01/24 0346      MRSA PCR No MRSA Detected    Narrative:      The negative predictive value of this diagnostic test is high and should only be used to consider de-escalating anti-MRSA therapy. A positive result may indicate colonization with MRSA and must be correlated clinically.     Radiology:      Foot x-rays reviewed.  Noted areas of concern for foreign body versus artifact.  No gas in soft tissue appreciated.        Imaging Results (Last 72 Hours)         Procedure Component Value Units Date/Time      CT Head Without Contrast [[id number]] Collected: 08/01/24 0524       Updated: 08/01/24 0531     Narrative:       EXAMINATION: CT HEAD WO CONTRAST-      7/31/2024 10:41 PM     HISTORY: altered mental status; M86.9-Osteomyelitis, unspecified;  E11.10-Type 2 diabetes mellitus with ketoacidosis without coma;  R79.89-Other specified abnormal findings of blood chemistry;  I48.91-Unspecified atrial fibrillation; R41.0-Disorientation,  unspecified     In order to have a CT radiation dose as low as reasonably achievable  Automated Exposure Control was utilized for adjustment of the mA and/or  KV according to patient size.     Total DLP = 783.72 mGy.cm     The CT scan of the head is performed without intravenous contrast  enhancement.     The images are acquired in axial plane and subsequent reconstruction  with coronal and sagittal planes.     Comparison is made with the previous study dated 5/3/2024.     There is no evidence of a mass. There is no midline shift.     There is no evidence of intracranial hemorrhage or hematoma.     Moderately dilated ventricles, basal cisterns and the cortical sulci are  similar to the previous study representing chronic volume loss.     Areas of chronic white matter ischemia bilaterally are noted. The  gray-white matter differentiation is maintained.     Posterior fossa structures are normal.     The images reviewed in bone window show no acute displaced skull  fracture. A subtle nondisplaced fracture or lesion may be obscured due  to motion artifacts. Large mucous retention cyst is seen in the right  maxillary antrum. The remaining paranasal sinuses and mastoid air cells  are clear.        Impression:       1. No acute intracranial abnormality.  2. Chronic ischemic and atrophic changes.  3. Chronic maxillary sinusitis.     The above study was initially reviewed and reported by [facility]. I do not  find any discrepancies.                                      This report was signed and finalized  on 8/1/2024 5:27 AM by Dr. Carlos Cutler MD.        CT Abdomen Pelvis With Contrast [[id number]] Resulted: 08/01/24 0737       Updated: 07/31/24 2355     XR Foot 3+ View Left [[id number]] Collected: 07/31/24 1941       Updated: 07/31/24 1950     Narrative:       EXAMINATION:  XR FOOT 3+ VW LEFT-  7/31/2024 6:22 PM     HISTORY: Heel wound.     COMPARISON: 3/26/2024.     TECHNIQUE: 3 views were obtained.     FINDINGS: There is a deep ulcer on the sole of the foot posteriorly. The  ulcer appears to be packed with some type of radiopaque material. On the  lateral image, there may be a small area of bony erosion involving the  calcaneus just posterior to the plantar calcaneal spur. A small area of  osteomyelitis is not ruled out. There has been prior amputation of the  fifth toe and distal fifth metatarsal. There may have been prior  resection of the distal fourth metacarpal and a portion of the proximal  phalanx of the fourth toe versus chronic erosive change. The appearance  is stable. There is severe narrowing of the first MTP joint. There is  narrowing of some of the interphalangeal joints. There is some spurring  in the tarsal region and at the tarsal-metatarsal junction. There is  soft tissue swelling of the foot diffusely. There is a small curvilinear  foreign body in the soft tissues along the sole of the foot in the  second toe region in the proximal phalanx area. There is another small  linear foreign body in the soft tissues adjacent to the distal phalanx  of the great toe.           Impression:       1. Deep ulcer on the sole of the foot posteriorly near the calcaneus.  There is a questionable small area of bony erosion along the plantar  surface of the calcaneus just proximal to the plantar calcaneal spur.  Osteomyelitis cannot be ruled out. The soft tissue ulcer is packed with  some type of radiopaque material.  2. Linear foreign bodies projected over the soft tissues of the second  toe and first toe.  Artifacts are also included in the differential.  3. Other chronic changes, as discussed.        This report was signed and finalized on 7/31/2024 7:47 PM by Dr. Raz Mendes MD.        XR Chest 1 View [[id number]] Collected: 07/31/24 1940       Updated: 07/31/24 1944     Narrative:       EXAMINATION:  XR CHEST 1 VW-  7/31/2024 6:22 PM     HISTORY: Altered mental status. Hypertension and diabetes.     COMPARISON: 6/28/2024.     TECHNIQUE: Single view AP image.     FINDINGS: There is hypoventilation with vascular crowding. There is mild  bronchial wall thickening, stable. There is no dense infiltrate or  effusion. Heart size is borderline. Prior heart bypass surgery. Prior  cervical fusion. No definite acute bony abnormality.           Impression:       1. Hypoventilation with vascular crowding.  2. Mild bronchial wall thickening, stable.           This report was signed and finalized on 7/31/2024 7:41 PM by Dr. Raz Mendes MD.                      HOSPITAL PROBLEM LIST:       DKA, type 2, not at goal        IMPRESSION:  Confusion on presentation-mental status back to baseline.  Patient with glucose in the 700s on admission.  Likely contributing to his altered mental status.  Suspect infection involving left foot and certainly has portal of entry for bacteria.  Diabetic foot infection with history of osteomyelitis persistent open wound on heel and concerning area of nonviable skin laterally (no rios necrosis) associated with cellulitis.  I had not seen the x-rays of the foot when I examined the patient so did not specifically look at the areas of concern for foreign body  Uncontrolled diabetes mellitus with hemoglobin A1c greater than 12  Complains of abdominal pain-patient has had that off and on previously and has been seen by GI during his admissions.  There is documented colonoscopy from July 2 however patient had inadequate prep.  Leukocytosis-improved  Chronic kidney disease stage IIIb with acute kidney  injury        RECOMMENDATION:   Continue vancomycin  Continue zosyn  Follow-up blood cultures  Reevaluate left foot more closely given concerns for foreign body versus artifact on x-rays  Await wound care evaluation-need to monitor lateral ankle closely.  Heel wound appears to be chronic and fairly clean  Diabetes management per intensivist     Reviewed emergency department notes  Reviewed admission history and physical     Julia Adrian MD  08/01/24  07:49 CDT

## 2024-08-01 NOTE — H&P
[facility] Intensivist Services    Date of Admission: 2024  Date of Note: 24  Primary Care Physician: Del Shetty MD    History   Mr. Luong is a 69-year-old male who presented to [facility] ER via EMS for altered mental status.  It was reported to ER physician by EMS that the patient was was found by his son confused and wandering around in the garage.  Unfortunately family was not available for additional information.   HPI obtained from ER physician, Dr. Dee, who advises that patient presented soiled, confused and unable to provided events leading to his reason for EMS transport.     ER course workup significant for: CBC with elevated WBC of 15.4, low hemoglobin 9.8 and hematocrit 29.6, elevated ESR 57, elevated CRP of 17.  CMP with low sodium 133, elevated BUN of 54 and elevated creatinine 2.64, elevated glucose 704, urine ketones and small amount of acetone. Elevated lactate 2.4 with delta 2.2.  Elevated HS troponin T 342 delta 353.   Urinalysis: Trace blood, positive nitrite, negative leukocytes, +2 bacteria.   CXR: Low lung volumes and vascular crowding.  X-ray of left foot: Ulcer on the sole of the foot posteriorly near the calcaneus.  There is questionable small area of bony erosion along the plantar surface of the calcaneus just proximal to the plantar calcaneal spur.  Osteomyelitis cannot be ruled out.   EKG: A-fib with rapid ventricular response with rate of 128 bpm.     The ICU team was asked to evaluate the patient for AMS, AFIB-RVR, DKA, and open wound to left foot concerning for possible osteomyelitis.     I have seen and evaluated patient upon his arrival to CCU 4 where he appears in no acute distress, breathing is equal and non-labored.  He is slow to provided answers and is intermittently confused. He is able to tell me his name, , and the year after many tries, he is unaware to reason for hospitalization or how he got here.   He currently has  insulin, and Cardizem drip infusing. Reports generalized discomfort, worse throughout the abdomen with associated nausea and reports vomiting early today and being sick the last couple days, does not give further details. Abdomen is distended and soft, grossly tender. Denies diarrhea or bloody/dark stool. Reports chest discomfort and shortness of breath. Open stage 2-3 decubitus ulcer to the left heel with associated soft tissue swelling, redness and weeping.     Past Medical History     Active and Resolved Problems  Active Hospital Problems    Diagnosis  POA    **DKA, type 2, not at goal [E11.10]  Yes      Resolved Hospital Problems   No resolved problems to display.       Past Medical History:   Past Medical History:   Diagnosis Date    Arthritis     Autonomic disease     CAD (coronary artery disease) 02/06/2017    Cervical radiculopathy 09/16/2021    Chronic constipation with acute exaccerbation 05/10/2021    Coronary artery disease     Degeneration of cervical intervertebral disc 08/11/2021    Diabetes mellitus     Diabetic foot ulcer 08/31/2020    Diabetic polyneuropathy associated with type 2 diabetes mellitus 01/18/2021    Elevated cholesterol     Gastroesophageal reflux disease 05/13/2019    Headache     HTN (hypertension), benign 05/03/2017    Hyperlipidemia     Hypertension     Mixed hyperlipidemia 02/07/2017    Multiple lung nodules 01/26/2020    5mm, 9 mm RLL identified 1/2020, not present 10/2019.    Myocardial infarction     Osteomyelitis 01/22/2020    Osteomyelitis of fifth toe of right foot 10/07/2019    Pancreatitis     Persistent insomnia 01/20/2020    Renal disorder     Sleep apnea 02/06/2017    Sleep apnea with use of continuous positive airway pressure (CPAP)     NON-COMPLIANT    Slow transit constipation 01/16/2019    Spinal stenosis in cervical region 09/16/2021    Vitamin D deficiency 03/02/2021       Prior Surgeries: He  has a past surgical history that includes Appendectomy; Abdominal  surgery; Cataract extraction; Cardiac surgery; Incision and drainage of wound (Left, 09/2007); Colonoscopy w/ polypectomy (03/04/2014); Esophagogastroduodenoscopy (04/13/2011); Cervical spine surgery; Colonoscopy (N/A, 01/31/2017); Esophagogastroduodenoscopy (N/A, 05/05/2017); Coronary artery bypass graft (10/2015); Back surgery; Esophagogastroduodenoscopy (N/A, 02/11/2019); Colonoscopy (N/A, 02/11/2019); Esophagogastroduodenoscopy (N/A, 09/01/2020); Esophagogastroduodenoscopy (N/A, 02/10/2021); Cardiac catheterization (Left, 02/08/2021); Amputation foot / toe (Left, 10/2021); Foot surgery (Left); Anterior cervical discectomy w/ fusion (N/A, 08/05/2022); Colonoscopy (N/A, 04/07/2024); Esophagogastroduodenoscopy (N/A, 04/11/2024); Colonoscopy (N/A, 7/1/2024); and Colonoscopy (N/A, 7/2/2024).    Past Surgical History:   Past Surgical History:   Procedure Laterality Date    ABDOMINAL SURGERY      AMPUTATION FOOT / TOE Left 10/2021    5th digit     ANTERIOR CERVICAL DISCECTOMY W/ FUSION N/A 08/05/2022    Procedure: CERVICAL DISCECTOMY ANTERIOR WITH FUSION C5-6 with possible plating of C5-7 with neuromonitoring and 1 c-arm;  Surgeon: Karel Soliz MD;  Location: [facility] OR;  Service: Neurosurgery;  Laterality: N/A;    APPENDECTOMY      BACK SURGERY      CARDIAC CATHETERIZATION Left 02/08/2021    Procedure: Left Heart Cath w poss intervention left anatomical snuff box acess;  Surgeon: Omkar Charles DO;  Location:  [facility] CATH INVASIVE LOCATION;  Service: Cardiology;  Laterality: Left;    CARDIAC SURGERY      CATARACT EXTRACTION      CERVICAL SPINE SURGERY      COLONOSCOPY N/A 01/31/2017    Normal exam repeat in 5 years    COLONOSCOPY N/A 02/11/2019    Mild acute inflammation    COLONOSCOPY N/A 04/07/2024    2 areas at 10 and 20 cm with friability ulceration 2 clips placed at 20 cm and 4 clips at 10 cm poor prep normal mucosa,mild eroisions and ulcerations in visible vessels    COLONOSCOPY N/A 7/1/2024     Procedure: COLONOSCOPY WITH ANESTHESIA;  Surgeon: Arsalan Lorenzo DO;  Location: [facility] ENDOSCOPY;  Service: Gastroenterology;  Laterality: N/A;  pre op constipation/diarrhea  post poor prep  pcp Del Shetty    COLONOSCOPY N/A 7/2/2024    Procedure: COLONOSCOPY WITH ANESTHESIA;  Surgeon: Agapito Christopher MD;  Location: [facility] ENDOSCOPY;  Service: Gastroenterology;  Laterality: N/A;  pre rectal bleeding  post poor prep  pcp Del Shetty MD    COLONOSCOPY W/ POLYPECTOMY  03/04/2014    Hyperplastic polyp    CORONARY ARTERY BYPASS GRAFT  10/2015    ENDOSCOPY  04/13/2011    Gastritis with hemorrhage    ENDOSCOPY N/A 05/05/2017    Normal exam    ENDOSCOPY N/A 02/11/2019    Gastritis    ENDOSCOPY N/A 09/01/2020    Non-erosive gastritis with hemorrhage    ENDOSCOPY N/A 02/10/2021    Esophagitis    ENDOSCOPY N/A 04/11/2024    There were esophageal mucosal changes suspicious for short-segment Low's esophagus present in the distal esophagus. The maximum longitudinal extent of these mucosal changes was 2 cm in length. Mucosa was biopsied with a cold forceps for histologyDistal esophagus, biopsies: Mild chronic active esophagogastritis. No evidence of intestinal metaplasia, dysplasia. Antrum, bx, Mild chronic gastritis    FOOT SURGERY Left     INCISION AND DRAINAGE OF WOUND Left 09/2007    spider bite       Social and Family History     Family History:  family history includes Alzheimer's disease in his mother; Colon cancer in his father and sister; Colon polyps in his sister; Coronary artery disease in his sister and sister; Heart disease in his father.    Tobacco/Social History:  reports that he quit smoking about 33 years ago. His smoking use included cigarettes. He has never used smokeless tobacco. He reports that he does not drink alcohol and does not use drugs.    Allergies     Allergies:   He is allergic to cefepime, bactrim [sulfamethoxazole-trimethoprim], vancomycin, zolpidem, and  "metronidazole.    Allergies   Allergen Reactions    Cefepime Hives and Anaphylaxis    Bactrim [Sulfamethoxazole-Trimethoprim] Other (See Comments)     \"RENAL FAILURE\"    Vancomycin Itching    Zolpidem Mental Status Change     \"makes him crazy\"    Metronidazole Rash       Labs     Basic Labs:  Common labs          7/3/2024    08:12 7/25/2024    10:18 7/31/2024    18:49 7/31/2024    20:30   Common Labs   Glucose 115  59  704  653    BUN 22  43  55  54    Creatinine 1.54  2.24  2.67  2.64    Sodium 143  144  132  133    Potassium 3.5  3.4  4.5  4.4    Chloride 104  106  97  98    Calcium 8.8  9.2  9.0  8.9    Albumin  3.8  3.5     Total Bilirubin  0.3  0.6     Alkaline Phosphatase  84  101     AST (SGOT)  11  11     ALT (SGPT)  7  8     WBC  8.92  15.48     Hemoglobin  11.2  9.8     Hematocrit  34.9  29.6     Platelets  223  201     Total Cholesterol  168      Triglycerides  69      HDL Cholesterol  57      LDL Cholesterol   98      Hemoglobin A1C  11.60  12.60     Microalbumin, Urine  73.7      PSA  0.403          Diabetic:  A1C Last 3 Results          5/3/2024    16:22 7/25/2024    10:18 7/31/2024    18:49   HGBA1C Last 3 Results   Hemoglobin A1C 11.20  11.60  12.60        Inpatient Medications     Medications: Scheduled Meds:enoxaparin, 1 mg/kg, Subcutaneous, Q12H  sodium chloride, 10 mL, Intravenous, Q12H  vancomycin, 2,500 mg, Intravenous, Once   Followed by  [START ON 8/1/2024] vancomycin, 750 mg, Intravenous, Q24H      Continuous Infusions:dextrose 5 % and sodium chloride 0.45 %, 150 mL/hr  dextrose 5 % and sodium chloride 0.45 % with KCl 20 mEq/L, 150 mL/hr  dextrose 5 % and sodium chloride 0.45 % with KCl 40 mEq/L, 150 mL/hr  dextrose 5 % and sodium chloride 0.9 %, 150 mL/hr  dextrose 5 % and sodium chloride 0.9 % with KCl 20 mEq, 150 mL/hr  dextrose 5% and sodium chloride 0.9% with KCl 40 mEq/L, 150 mL/hr  dilTIAZem, 5-15 mg/hr, Last Rate: 5 mg/hr (07/31/24 2031)  insulin, 0-100 Units/hr, Last Rate: 3.9 " "Units/hr (07/31/24 1767)  Pharmacy to Dose enoxaparin (LOVENOX),   sodium chloride 0.45 % 1,000 mL with potassium chloride 40 mEq infusion, 250 mL/hr  sodium chloride, 250 mL/hr  sodium chloride 0.45 % with KCl 20 mEq, 250 mL/hr  sodium chloride, 250 mL/hr  sodium chloride 0.9 % with KCl 20 mEq, 250 mL/hr  sodium chloride 0.9 % with KCl 40 mEq/L, 250 mL/hr      PRN Meds:.  Calcium Replacement - Follow Nurse / BPA Driven Protocol    dextrose    dextrose    dextrose 5 % and sodium chloride 0.45 %    dextrose 5 % and sodium chloride 0.45 % with KCl 20 mEq/L    dextrose 5 % and sodium chloride 0.45 % with KCl 40 mEq/L    dextrose 5 % and sodium chloride 0.9 %    dextrose 5 % and sodium chloride 0.9 % with KCl 20 mEq    dextrose 5% and sodium chloride 0.9% with KCl 40 mEq/L    glucagon (human recombinant)    Magnesium Standard Dose Replacement - Follow Nurse / BPA Driven Protocol    Pharmacy to Dose enoxaparin (LOVENOX)    Phosphorus Replacement - Follow Nurse / BPA Driven Protocol    Potassium Replacement - Follow Nurse / BPA Driven Protocol    sodium chloride 0.45 % 1,000 mL with potassium chloride 40 mEq infusion    sodium chloride    sodium chloride 0.45 % with KCl 20 mEq    sodium chloride    sodium chloride    sodium chloride    sodium chloride 0.9 % with KCl 20 mEq    sodium chloride 0.9 % with KCl 40 mEq/L    I have reviewed the patient's current medications.   Outpatient Medications     Outpatient Medications:   No outpatient medications have been marked as taking for the 7/31/24 encounter (Hospital Encounter).       Current Antibiotics     vancomycin  vancomycin (VANCOCIN) IVPB in 500 mL (1750 mg or greater)    Exam     Vitals: His  height is 182.9 cm (72\") and weight is 132 kg (290 lb). His oral temperature is 99.5 °F (37.5 °C). His blood pressure is 100/53 and his pulse is 82. His respiration is 21 and oxygen saturation is 97%.     GENERAL: Confused, no acute distress.   SKIN:  Warm, dry  EYES:  Pupils equal, " "round and reactive to light.  EOMs intact.    HEAD:  Normocephalic.  NECK:  Supple   RESP:  Lungs clear to auscultation. Good airflow. Normal respiratory effort.   CARDIAC:  AFIB rate controlled 80 bpm, on cardizem drip currently. Normal S1,S2. No edema  GI: protuberant, distended, soft, tender throughout.  MSK:  Normal muscle bulk, tone  NEUROLOGICAL:  No focal neurological deficits. Follows commands  PSYCHIATRIC:  Normal affect and mood.  Alert to person and place, disoriented to time or reason for hospitalization or how he arrived to facility.     Results and Cultures Review     Result Review:  I have personally reviewed the results from the time of this admission to 7/31/2024 22:46 CDT and agree with these findings:  [x]  Laboratory list / accordion  [x]  Microbiology  []  Radiology  [x]  EKG/Telemetry   [x]  Cardiology/Vascular   []  Pathology  [x]  Old records  []  Other:  Most notable findings include:  see HPI      Culture Data:   No results found for: \"BLOODCX\", \"URINECX\", \"WOUNDCX\", \"MRSACX\", \"RESPCX\", \"STOOLCX\"    Assessment/Plan   This is a 69-year-old male who presented to [facility] ER via EMS for altered mental status.  PMHx DM type II, atrial fibrillation, CAD, chronic poor healing diabetic left foot ulcer with osteomyelitis, diabetic polyneuropathy, hypertension, hyperlipidemia insomnia, CKD stage IV, BRITTNI-CPAP, cervical spine stenosis, and vitamin D deficiency with numerous hospitalizations this year. PT was found to be confused in  AFIB-RVR, DKA, and open wound to left heal concerning acute infection with osteomyelitis. Critical care team asked to further manage acute illness.     Patient will be admitted under intensivist MD, Dr. Jamison, case will be discussed in the AM.  Further recommendations and/or modifications to the treatment plan are ultimately at his discretion.    Treatment Plan    AFIB-RVR  -chronic history of atrial fibrillation. On review of PMHx records he was prescribed and taking " Eliquis, which has been held due to GI bleeding after which he was suppose to f/u with cardiology about restarting. Epic shows cancelled f/u visits with cardiology, medication is currently not on patients med list.  -Lovenox AFIB coverage ordered in ED.  -Cardizem drip started in ED, rate controlled currently 80-90 bpm.   -likely rapid ventricular response 2/2 acute illness and probable underlying infection.  -Plan to DC Cardizem and transition back to his home dose BB  -elevated troponin in  delta 353, likely type II MI, cardiology consulted in ER, we appreciate their continued input.    ?Diabetic ketoacidosis  -History DM type 2 insulin-dependent  -small amount of acetone, ketones in urine  -do not have beta hydroxybutyrate at this facility  -BS at arrival 704  -normal anion gap  -normal pH 7.41  -Insulin drip started in ER will discontinue and transition to SSI  -likely HHS in the setting of acute illness    Urinary Tract Infection  -urinalysis shows nitrate positive, and +2 bacteria  -Hx of ESBL 6/30/24  -Urine culture pending  -Started on Zosyn/Vanco in the ER will continue  -ID consult    Diabetic foot ulcer  -chronic open wound, DC ulcer stage 2-3 to left heal with larger area soft pale tissue with surrounding edema/erythema and weeping at the outer aspect of the foot along the area of the 5th metatarsal.   -Hx of osteomyelitis and related MRSA bacteremia   -Zosyn/Vanco started in ED will continue   -podiatry consult  -wound care consult    Altered mental status  -likely related to acute illness/metabolic encephalopathy.  -cannot r/u stroke. No CT head in ER. No focal neuro deficits  -CT head ordered-results pending     Abdominal pain  -reported nausea/vomiting. Abdominal distention and tender on exam  -Hx of pancreatitis  -previous appendectomy  -Hx of GI bleeding, no active bleeding noted  -No CT abd/pel in ER  -CT abd/pel with contrast ordered-results pending    CKD-IV  -bun/creatinine at  baseline  -monitor lytes and urine output   -avoid neph toxic medications      VTE Prophylaxis:    Pharmacologic VTE prophylaxis orders are present.        Total critical care time: Approximately 65 minutes    Due to a high probability of clinically significant, life threatening deterioration, the patient required my highest level of preparedness to intervene emergently and I personally spent this critical care time directly and personally managing the patient.     This critical care time included obtaining a history; examining the patient; pulse oximetry; ordering and review of studies; arranging urgent treatment with development of a management plan; evaluation of patient's response to treatment; frequent reassessment; and, discussions with other providers.    This critical care time was performed to assess and manage the high probability of imminent, life-threatening deterioration that could result in multi-organ failure. It was exclusive of separately billable procedures and treating other patients and teaching time.    Please see MDM section and the rest of the note for further information on patient assessment and treatment.    Part of this note may be an electronic transcription/translation of spoken language to printed text using the Dragon Dictation System.    Electronically signed by SUSAN Richter on 7/31/2024 at 22:46 CDT

## 2024-08-01 NOTE — PROGRESS NOTES
"Pharmacy Dosing Service  Pharmacokinetics  Vancomycin Initial Evaluation  Assessment/Action/Plan:  Loading dose?: 2500 mg  Current Order: Vancomycin 750 mg IVPB every 24 hours  Current end date:08/07  Levels: first trough - 08/03  Additional antimicrobial agent(s): Zosyn  MRSA Nasal PCR ordered: Yes (Vancomycin indication is pneumonia, bone/joint, SSTI or IAI)    Vancomycin dosage initiated based on population pharmacokinetic parameters. Pharmacy will continue to follow daily and adjust dose accordingly.     Subjective:  Erick Luong is a 69 y.o. male with a Vancomycin \"Pharmacy to Dose\" consult for the treatment of SSTI, bacteremia, UTI, and bone and/or joint infection  day 1 of 7 of treatment.    AUC Model Data:  Loading dose: N/A  Regimen: 750 mg IV every 24 hours.  Start time: 06:53 on 08/02/2024  Exposure target: AUC24 (range)400-600 mg/L.hr   AUC24,ss: 456 mg/L.hr  PAUC*: 60 %  Ctrough,ss: 13.7 mg/L  Pconc*: 31 %  Tox.: 9 %    Objective:  Ht: 182.9 cm (72\"); Wt: 130 kg (286 lb 13.1 oz)  Estimated Creatinine Clearance: 43.2 mL/min (A) (by C-G formula based on SCr of 2.28 mg/dL (H)).   Creatinine   Date Value Ref Range Status   08/01/2024 2.28 (H) 0.76 - 1.27 mg/dL Final   08/01/2024 2.39 (H) 0.76 - 1.27 mg/dL Final   08/01/2024 2.46 (H) 0.76 - 1.27 mg/dL Final   01/03/2022 2.6 (H) 0.5 - 1.2 mg/dL Final   07/21/2021 2.30 (H) 0.60 - 1.30 mg/dL Final     Comment:     Serial Number: [id number]:  [id number]      Lab Results   Component Value Date    WBC 12.83 (H) 08/01/2024    WBC 15.48 (H) 07/31/2024    WBC 8.92 07/25/2024      Baseline culture results:  Microbiology Results (last 10 days)       Procedure Component Value - Date/Time    MRSA Screen, PCR (Inpatient) - Swab, Nares [[id number]]  (Normal) Collected: 08/01/24 0206    Lab Status: Final result Specimen: Swab from Nares Updated: 08/01/24 0346     MRSA PCR No MRSA Detected    Narrative:      The negative predictive value of this diagnostic test is high and " should only be used to consider de-escalating anti-MRSA therapy. A positive result may indicate colonization with MRSA and must be correlated clinically.            Anthony Arriaza, PharmD  08/01/24 05:50 CDT

## 2024-08-01 NOTE — NURSING NOTE
[facility]  INPATIENT WOUND & OSTOMY CARE    Today's Date: 08/01/24    Patient Name: Erick Luong  MRN: [id number]  CSN: [id number]  PCP: Del Shetty MD  Attending Provider: Keren Jamison MD  Length of Stay: 1    I placed pressure injury prevention measure orders per protocol due to patient being at risk for skin breakdown and being admitted to the unit.     Apply silicone foam border dressing per protocol to sacral spine/bilateral heels for protection.  Nursing to change dressing every 3 days and PRN if soiled. Nursing is to peel back dressing with every assessment to assess skin underneath dressing. No barrier cream under dressing.    Please reach out to wound care nurse if any skin issues arise.     This document has been electronically signed by Ada Choe RN on 8/1/2024 09:07 CDT

## 2024-08-01 NOTE — PROGRESS NOTES
"INFECTIOUS DISEASES CONSULT NOTE    Patient:  Erick Luong 69 y.o. male  ROOM # C004/1  YOB: 1955  MRN: [id number]  [facility]:  [id number]  Admit date: 7/31/2024   Admitting Physician: Keren Jamison MD  Primary Care Physician: Del Shetty MD  REFERRING PROVIDER: Abebe Garzon DO    Consults    REASON FOR CONSULTATION :hx MRSA bactermia/ESBL, DC ulcer       HISTORY OF PRESENT ILLNESS: The patient is a 61-year-old uncontrolled diabetic male who have seen previously with diabetic foot infection.  He primarily gets his podiatry care with Dr. Gomes in [place].    I last saw him in May as a follow-up for MRSA bacteremia secondary to infected left foot and associated cellulitis.    He says he normally sees Dr. Gomes every Monday but did not go last Monday but could not tell me why.    Patient was apparently confused and found wandering in his garage by his son.  In the emergency department he was found to have his wounds on his left foot caked with hair and dirt.    Patient also tells me his glucose has been out of control \"for a while\" and when I tried to pin him down he said for about a month.  Glucose was 700 in the emergency department.    He is remained hemodynamically stable in the CCU and has been given Zosyn and vancomycin.  Patient denies any dysuria.  He does state he has urinary frequency but that that is not uncommon.  He did complain of some abdominal pain and CT scan of the abdomen pelvis has been completed.      Past Medical History:   Diagnosis Date    Arthritis     Autonomic disease     CAD (coronary artery disease) 02/06/2017    Cervical radiculopathy 09/16/2021    Chronic constipation with acute exaccerbation 05/10/2021    Coronary artery disease     Degeneration of cervical intervertebral disc 08/11/2021    Diabetes mellitus     Diabetic foot ulcer 08/31/2020    Diabetic polyneuropathy associated with type 2 diabetes mellitus 01/18/2021    Elevated cholesterol     " Gastroesophageal reflux disease 05/13/2019    Headache     HTN (hypertension), benign 05/03/2017    Hyperlipidemia     Hypertension     Mixed hyperlipidemia 02/07/2017    Multiple lung nodules 01/26/2020    5mm, 9 mm RLL identified 1/2020, not present 10/2019.    Myocardial infarction     Osteomyelitis 01/22/2020    Osteomyelitis of fifth toe of right foot 10/07/2019    Pancreatitis     Persistent insomnia 01/20/2020    Renal disorder     Sleep apnea 02/06/2017    Sleep apnea with use of continuous positive airway pressure (CPAP)     NON-COMPLIANT    Slow transit constipation 01/16/2019    Spinal stenosis in cervical region 09/16/2021    Vitamin D deficiency 03/02/2021     Past Surgical History:   Procedure Laterality Date    ABDOMINAL SURGERY      AMPUTATION FOOT / TOE Left 10/2021    5th digit     ANTERIOR CERVICAL DISCECTOMY W/ FUSION N/A 08/05/2022    Procedure: CERVICAL DISCECTOMY ANTERIOR WITH FUSION C5-6 with possible plating of C5-7 with neuromonitoring and 1 c-arm;  Surgeon: Karel Soliz MD;  Location: [facility] OR;  Service: Neurosurgery;  Laterality: N/A;    APPENDECTOMY      BACK SURGERY      CARDIAC CATHETERIZATION Left 02/08/2021    Procedure: Left Heart Cath w poss intervention left anatomical snuff box acess;  Surgeon: Omkar Charles DO;  Location: [facility] CATH INVASIVE LOCATION;  Service: Cardiology;  Laterality: Left;    CARDIAC SURGERY      CATARACT EXTRACTION      CERVICAL SPINE SURGERY      COLONOSCOPY N/A 01/31/2017    Normal exam repeat in 5 years    COLONOSCOPY N/A 02/11/2019    Mild acute inflammation    COLONOSCOPY N/A 04/07/2024    2 areas at 10 and 20 cm with friability ulceration 2 clips placed at 20 cm and 4 clips at 10 cm poor prep normal mucosa,mild eroisions and ulcerations in visible vessels    COLONOSCOPY N/A 7/1/2024    Procedure: COLONOSCOPY WITH ANESTHESIA;  Surgeon: Arsalan Lorenzo DO;  Location: [facility] ENDOSCOPY;  Service: Gastroenterology;  Laterality: N/A;   pre op constipation/diarrhea  post poor prep  pcp Del Shetty    COLONOSCOPY N/A 2024    Procedure: COLONOSCOPY WITH ANESTHESIA;  Surgeon: Agapito Christopher MD;  Location: [facility] ENDOSCOPY;  Service: Gastroenterology;  Laterality: N/A;  pre rectal bleeding  post poor prep  pcp Del Shetty MD    COLONOSCOPY W/ POLYPECTOMY  2014    Hyperplastic polyp    CORONARY ARTERY BYPASS GRAFT  10/2015    ENDOSCOPY  2011    Gastritis with hemorrhage    ENDOSCOPY N/A 2017    Normal exam    ENDOSCOPY N/A 2019    Gastritis    ENDOSCOPY N/A 2020    Non-erosive gastritis with hemorrhage    ENDOSCOPY N/A 02/10/2021    Esophagitis    ENDOSCOPY N/A 2024    There were esophageal mucosal changes suspicious for short-segment Low's esophagus present in the distal esophagus. The maximum longitudinal extent of these mucosal changes was 2 cm in length. Mucosa was biopsied with a cold forceps for histologyDistal esophagus, biopsies: Mild chronic active esophagogastritis. No evidence of intestinal metaplasia, dysplasia. Antrum, bx, Mild chronic gastritis    FOOT SURGERY Left     INCISION AND DRAINAGE OF WOUND Left 2007    spider bite     Family History   Problem Relation Age of Onset    Colon cancer Father     Heart disease Father     Colon cancer Sister     Colon polyps Sister     Alzheimer's disease Mother     Coronary artery disease Sister     Coronary artery disease Sister      Social History     Socioeconomic History    Marital status:    Tobacco Use    Smoking status: Former     Current packs/day: 0.00     Types: Cigarettes     Quit date:      Years since quittin.6    Smokeless tobacco: Never    Tobacco comments:     smoked in highschool   Vaping Use    Vaping status: Never Used   Substance and Sexual Activity    Alcohol use: No    Drug use: No    Sexual activity: Defer       Current Scheduled Medications:   [START ON 2024] Chlorhexidine Gluconate Cloth, 1  Application, Topical, Q24H  enoxaparin, 1 mg/kg, Subcutaneous, Q12H  insulin regular, 3-14 Units, Subcutaneous, Q6H  mupirocin, 1 Application, Each Nare, BID  piperacillin-tazobactam, 3.375 g, Intravenous, Q8H  senna-docusate sodium, 2 tablet, Oral, BID  sodium chloride, 10 mL, Intravenous, Q12H  sodium chloride, 10 mL, Intravenous, Q12H  vancomycin, 750 mg, Intravenous, Q24H              Current PRN Medications:    senna-docusate sodium **AND** polyethylene glycol **AND** bisacodyl **AND** bisacodyl    Calcium Replacement - Follow Nurse / BPA Driven Protocol    dextrose    dextrose    dextrose    dextrose 5 % and sodium chloride 0.45 %    dextrose 5 % and sodium chloride 0.45 % with KCl 20 mEq/L    dextrose 5 % and sodium chloride 0.45 % with KCl 40 mEq/L    dextrose 5 % and sodium chloride 0.9 %    dextrose 5 % and sodium chloride 0.9 % with KCl 20 mEq    dextrose 5% and sodium chloride 0.9% with KCl 40 mEq/L    glucagon (human recombinant)    Magnesium Standard Dose Replacement - Follow Nurse / BPA Driven Protocol    nitroglycerin    Pharmacy to Dose enoxaparin (LOVENOX)    Pharmacy to dose vancomycin    Pharmacy to Dose Zosyn    Phosphorus Replacement - Follow Nurse / BPA Driven Protocol    Potassium Replacement - Follow Nurse / BPA Driven Protocol    sodium chloride 0.45 % 1,000 mL with potassium chloride 40 mEq infusion    sodium chloride    sodium chloride 0.45 % with KCl 20 mEq    sodium chloride    sodium chloride    sodium chloride    sodium chloride    sodium chloride    sodium chloride 0.9 % with KCl 20 mEq    sodium chloride 0.9 % with KCl 40 mEq/L      dextrose 5 % and sodium chloride 0.45 %, 150 mL/hr  dextrose 5 % and sodium chloride 0.45 % with KCl 20 mEq/L, 150 mL/hr  dextrose 5 % and sodium chloride 0.45 % with KCl 40 mEq/L, 150 mL/hr  dextrose 5 % and sodium chloride 0.9 %, 150 mL/hr  dextrose 5 % and sodium chloride 0.9 % with KCl 20 mEq, 150 mL/hr  dextrose 5% and sodium chloride 0.9% with KCl  "40 mEq/L, 150 mL/hr  dilTIAZem, 5-15 mg/hr, Last Rate: Stopped (24)  Pharmacy to Dose enoxaparin (LOVENOX),   Pharmacy to dose vancomycin,   Pharmacy to Dose Zosyn,   sodium chloride 0.45 % 1,000 mL with potassium chloride 40 mEq infusion, 250 mL/hr  sodium chloride, 250 mL/hr  sodium chloride 0.45 % with KCl 20 mEq, 250 mL/hr, Last Rate: Stopped (24)  sodium chloride, 250 mL/hr  sodium chloride 0.9 % with KCl 20 mEq, 250 mL/hr  sodium chloride 0.9 % with KCl 40 mEq/L, 250 mL/hr             Allergies   Allergen Reactions    Cefepime Hives and Anaphylaxis    Bactrim [Sulfamethoxazole-Trimethoprim] Other (See Comments)     \"RENAL FAILURE\"    Vancomycin Itching    Zolpidem Mental Status Change     \"makes him crazy\"    Metronidazole Rash           Vital Signs:  /75   Pulse 77   Temp 97.6 °F (36.4 °C) (Oral)   Resp 20   Ht 182.9 cm (72\")   Wt 130 kg (286 lb 13.1 oz)   SpO2 100%   BMI 38.90 kg/m²   Temp (24hrs), Av.1 °F (36.7 °C), Min:97.6 °F (36.4 °C), Max:99.5 °F (37.5 °C)      Physical Exam  General: Patient is a 69-year-old gentleman lying in bed appearing in no acute distress  HEENT: Sclera anicteric.  There is no conjunctival petechiae  Heart: Irregularly irregular  Lungs: Clear bilaterally  Abdomen: Soft, no rebound or guarding.  No mass appreciated  Extremities: See photos.  Patient does have areas of excoriations and some eschar formation on both toes.  Patient does have a large ulcer as seen below on photo however he also has an area of pale nonviable appearing skin.  I do not appreciate any necrosis.  I did not palpate any fluctuance or crepitus.            Results Review:    I reviewed the patient's new clinical results.    Lab Results:    CBC:   Lab Results   Lab 24  1018 24  1849 24  0419   WBC 8.92 15.48* 12.83*   HEMOGLOBIN 11.2* 9.8* 9.0*   HEMATOCRIT 34.9* 29.6* 28.3*   PLATELETS 223 201 176        AutoDiff:   Lab Results   Lab 24  1018 " 07/31/24 1849 08/01/24 0419   NEUTROPHIL % 55.0 88.7* 79.2*   LYMPHOCYTE % 28.4 2.9* 10.1*   MONOCYTES % 8.7 7.1 9.6   EOSINOPHIL % 7.0* 0.1* 0.2*   BASOPHIL % 0.7 0.3 0.3   NEUTROS ABS 4.91 13.73* 10.16*   LYMPHS ABS 2.53 0.45* 1.29   MONOS ABS 0.78 1.10* 1.23*   EOS ABS 0.62* 0.01 0.03   BASOS ABS 0.06 0.05 0.04        Manual Diff:    Lab Results   Lab 07/25/24 1018 07/31/24 1849 08/01/24 0419   NEUTROS ABS 4.91 13.73* 10.16*        CMP:   Lab Results   Lab 07/25/24 1018 07/31/24 1849 07/31/24  2030 08/01/24  0021 08/01/24 0210 08/01/24 0419   SODIUM 144 132*   < > 138 136 138   POTASSIUM 3.4* 4.5   < > 4.1 4.0 4.1   CHLORIDE 106 97*   < > 103 103 103   CO2 27.0 19.0*   < > 21.0* 19.0* 21.0*   BUN 43* 55*   < > 52* 51* 50*   CREATININE 2.24* 2.67*   < > 2.46* 2.39* 2.28*   CALCIUM 9.2 9.0   < > 8.8 8.7 8.8   BILIRUBIN 0.3 0.6  --   --   --   --    ALK PHOS 84 101  --   --   --   --    ALT (SGPT) 7 8  --   --   --   --    AST (SGOT) 11 11  --   --   --   --    GLUCOSE 59* 704*   < > 370* 379* 370*    < > = values in this interval not displayed.       Lab Results (last 72 hours)       Procedure Component Value Units Date/Time    POC Glucose Once [[id number]]  (Abnormal) Collected: 08/01/24 0556    Specimen: Blood Updated: 08/01/24 0607     Glucose 338 mg/dL      Comment: : [id number] Thee SamiMeter ID: [id number]       Basic Metabolic Panel [[id number]]  (Abnormal) Collected: 08/01/24 0419    Specimen: Blood Updated: 08/01/24 0515     Glucose 370 mg/dL      BUN 50 mg/dL      Creatinine 2.28 mg/dL      Sodium 138 mmol/L      Potassium 4.1 mmol/L      Chloride 103 mmol/L      CO2 21.0 mmol/L      Calcium 8.8 mg/dL      BUN/Creatinine Ratio 21.9     Anion Gap 14.0 mmol/L      eGFR 30.5 mL/min/1.73     Narrative:      GFR Normal >60  Chronic Kidney Disease <60  Kidney Failure <15      Magnesium [[id number]]  (Abnormal) Collected: 08/01/24 0419    Specimen: Blood Updated: 08/01/24 0515     Magnesium 2.5  mg/dL     Phosphorus [[id number]]  (Normal) Collected: 08/01/24 0419    Specimen: Blood Updated: 08/01/24 0512     Phosphorus 3.6 mg/dL     CBC & Differential [[id number]]  (Abnormal) Collected: 08/01/24 0419    Specimen: Blood Updated: 08/01/24 0452    Narrative:      The following orders were created for panel order CBC & Differential.  Procedure                               Abnormality         Status                     ---------                               -----------         ------                     CBC Auto Differential[[id number]]        Abnormal            Final result                 Please view results for these tests on the individual orders.    CBC Auto Differential [[id number]]  (Abnormal) Collected: 08/01/24 0419    Specimen: Blood Updated: 08/01/24 0452     WBC 12.83 10*3/mm3      RBC 3.38 10*6/mm3      Hemoglobin 9.0 g/dL      Hematocrit 28.3 %      MCV 83.7 fL      MCH 26.6 pg      MCHC 31.8 g/dL      RDW 14.5 %      RDW-SD 44.3 fl      MPV 12.7 fL      Platelets 176 10*3/mm3      Neutrophil % 79.2 %      Lymphocyte % 10.1 %      Monocyte % 9.6 %      Eosinophil % 0.2 %      Basophil % 0.3 %      Immature Grans % 0.6 %      Neutrophils, Absolute 10.16 10*3/mm3      Lymphocytes, Absolute 1.29 10*3/mm3      Monocytes, Absolute 1.23 10*3/mm3      Eosinophils, Absolute 0.03 10*3/mm3      Basophils, Absolute 0.04 10*3/mm3      Immature Grans, Absolute 0.08 10*3/mm3      nRBC 0.0 /100 WBC     MRSA Screen, PCR (Inpatient) - Swab, Nares [[id number]]  (Normal) Collected: 08/01/24 0206    Specimen: Swab from Nares Updated: 08/01/24 0346     MRSA PCR No MRSA Detected    Narrative:      The negative predictive value of this diagnostic test is high and should only be used to consider de-escalating anti-MRSA therapy. A positive result may indicate colonization with MRSA and must be correlated clinically.    Basic Metabolic Panel [[id number]]  (Abnormal) Collected: 08/01/24 0210    Specimen: Blood Updated: 08/01/24  0253     Glucose 379 mg/dL      BUN 51 mg/dL      Creatinine 2.39 mg/dL      Sodium 136 mmol/L      Potassium 4.0 mmol/L      Chloride 103 mmol/L      CO2 19.0 mmol/L      Calcium 8.7 mg/dL      BUN/Creatinine Ratio 21.3     Anion Gap 14.0 mmol/L      eGFR 28.8 mL/min/1.73     Narrative:      GFR Normal >60  Chronic Kidney Disease <60  Kidney Failure <15      STAT Lactic Acid, Reflex [[id number]]  (Normal) Collected: 08/01/24 0210    Specimen: Blood Updated: 08/01/24 0250     Lactate 1.3 mmol/L     POC Glucose Once [[id number]]  (Abnormal) Collected: 08/01/24 0210    Specimen: Blood Updated: 08/01/24 0221     Glucose 360 mg/dL      Comment: : [id number] Thee SamiMeter ID: [id number]       Basic Metabolic Panel [[id number]]  (Abnormal) Collected: 08/01/24 0021    Specimen: Blood Updated: 08/01/24 0053     Glucose 370 mg/dL      BUN 52 mg/dL      Creatinine 2.46 mg/dL      Sodium 138 mmol/L      Potassium 4.1 mmol/L      Comment: Specimen hemolyzed.  Result may be falsely elevated.        Chloride 103 mmol/L      CO2 21.0 mmol/L      Calcium 8.8 mg/dL      BUN/Creatinine Ratio 21.1     Anion Gap 14.0 mmol/L      eGFR 27.8 mL/min/1.73     Narrative:      GFR Normal >60  Chronic Kidney Disease <60  Kidney Failure <15      Phosphorus [[id number]]  (Normal) Collected: 08/01/24 0021    Specimen: Blood Updated: 08/01/24 0043     Phosphorus 3.5 mg/dL     Magnesium [[id number]]  (Normal) Collected: 08/01/24 0021    Specimen: Blood Updated: 08/01/24 0042     Magnesium 2.4 mg/dL     POC Glucose Once [[id number]]  (Abnormal) Collected: 08/01/24 0010    Specimen: Blood Updated: 08/01/24 0022     Glucose 353 mg/dL      Comment: : [id number] Thee SamiMeter ID: [id number]       POC Glucose Once [[id number]]  (Abnormal) Collected: 07/31/24 2323    Specimen: Blood Updated: 07/31/24 2334     Glucose 237 mg/dL      Comment: : [id number] Thee SamiMeter ID: [id number]       STAT Lactic Acid, Reflex [[id number]]  (Abnormal)  Collected: 07/31/24 2240    Specimen: Blood Updated: 07/31/24 2300     Lactate 2.2 mmol/L     POC Glucose Once [[id number]]  (Abnormal) Collected: 07/31/24 2238    Specimen: Blood Updated: 07/31/24 2250     Glucose 369 mg/dL      Comment: : [id number] Thee SamiMeter ID: [id number]       POC Glucose Once [[id number]]  (Abnormal) Collected: 07/31/24 2124    Specimen: Blood Updated: 07/31/24 2136     Glucose 543 mg/dL      Comment: : [id number] Zafar AbigailMeter ID: [id number]       Basic Metabolic Panel [[id number]]  (Abnormal) Collected: 07/31/24 2030    Specimen: Blood Updated: 07/31/24 2111     Glucose 653 mg/dL      BUN 54 mg/dL      Creatinine 2.64 mg/dL      Sodium 133 mmol/L      Potassium 4.4 mmol/L      Chloride 98 mmol/L      CO2 20.0 mmol/L      Calcium 8.9 mg/dL      BUN/Creatinine Ratio 20.5     Anion Gap 15.0 mmol/L      eGFR 25.6 mL/min/1.73     Narrative:      GFR Normal >60  Chronic Kidney Disease <60  Kidney Failure <15      Phosphorus [[id number]]  (Normal) Collected: 07/31/24 1849    Specimen: Blood Updated: 07/31/24 2110     Phosphorus 2.7 mg/dL     Magnesium [[id number]]  (Normal) Collected: 07/31/24 1849    Specimen: Blood Updated: 07/31/24 2110     Magnesium 2.2 mg/dL     Osmolality, Serum [[id number]]  (Abnormal) Collected: 07/31/24 2030    Specimen: Blood Updated: 07/31/24 2103     Osmolality 329 mOsm/kg     High Sensitivity Troponin T 2Hr [[id number]]  (Abnormal) Collected: 07/31/24 2030    Specimen: Blood Updated: 07/31/24 2058     HS Troponin T 353 ng/L      Troponin T Delta 11 ng/L     Narrative:      High Sensitive Troponin T Reference Range:  <14.0 ng/L- Negative Female for AMI  <22.0 ng/L- Negative Male for AMI  >=14 - Abnormal Female indicating possible myocardial injury.  >=22 - Abnormal Male indicating possible myocardial injury.   Clinicians would have to utilize clinical acumen, EKG, Troponin, and serial changes to determine if it is an Acute Myocardial Infarction or  myocardial injury due to an underlying chronic condition.         Magnesium [[id number]]  (Normal) Collected: 07/31/24 2030    Specimen: Blood Updated: 07/31/24 2056     Magnesium 2.3 mg/dL     Phosphorus [[id number]]  (Normal) Collected: 07/31/24 2030    Specimen: Blood Updated: 07/31/24 2054     Phosphorus 3.1 mg/dL     POC Glucose Once [[id number]]  (Abnormal) Collected: 07/31/24 2021    Specimen: Blood Updated: 07/31/24 2033     Glucose 575 mg/dL      Comment: : [id number] Ryan Garcia ID: [id number]       Blood Gas, Venous - [[id number]]  (Abnormal) Collected: 07/31/24 2021    Specimen: Venous Blood Updated: 07/31/24 2021     Site OTHER     pH, Venous 7.410 pH Units      pCO2, Venous 32.2 mm Hg      Comment: 84 Value below reference range        pO2, Venous 55.6 mm Hg      Comment: 83 Value above reference range        HCO3, Venous 20.4 mmol/L      Comment: 84 Value below reference range        Base Excess, Venous -3.6 mmol/L      Comment: 84 Value below reference range        O2 Saturation, Venous 89.9 %      Comment: 83 Value above reference range        Temperature 37.0     Barometric Pressure for Blood Gas 751 mmHg      Modality Room Air     FIO2 21 %      Ventilator Mode NA     Collected by [id number]     Comment: Meter: [id number]     :  [email or web address]       Hemoglobin A1c [[id number]]  (Abnormal) Collected: 07/31/24 1849    Specimen: Blood Updated: 07/31/24 2004     Hemoglobin A1C 12.60 %     Narrative:      Hemoglobin A1C Ranges:    Increased Risk for Diabetes  5.7% to 6.4%  Diabetes                     >= 6.5%  Diabetic Goal                < 7.0%    Comprehensive Metabolic Panel [[id number]]  (Abnormal) Collected: 07/31/24 1849    Specimen: Blood Updated: 07/31/24 1944     Glucose 704 mg/dL      BUN 55 mg/dL      Creatinine 2.67 mg/dL      Sodium 132 mmol/L      Potassium 4.5 mmol/L      Chloride 97 mmol/L      CO2 19.0 mmol/L      Calcium 9.0 mg/dL      Total Protein 6.7 g/dL      Albumin  3.5 g/dL      ALT (SGPT) 8 U/L      AST (SGOT) 11 U/L      Alkaline Phosphatase 101 U/L      Total Bilirubin 0.6 mg/dL      Globulin 3.2 gm/dL      A/G Ratio 1.1 g/dL      BUN/Creatinine Ratio 20.6     Anion Gap 16.0 mmol/L      eGFR 25.2 mL/min/1.73     Narrative:      GFR Normal >60  Chronic Kidney Disease <60  Kidney Failure <15      C-reactive Protein [[id number]]  (Abnormal) Collected: 07/31/24 1849    Specimen: Blood Updated: 07/31/24 1932     C-Reactive Protein 17.04 mg/dL     High Sensitivity Troponin T [[id number]]  (Abnormal) Collected: 07/31/24 1849    Specimen: Blood Updated: 07/31/24 1929     HS Troponin T 342 ng/L     Narrative:      High Sensitive Troponin T Reference Range:  <14.0 ng/L- Negative Female for AMI  <22.0 ng/L- Negative Male for AMI  >=14 - Abnormal Female indicating possible myocardial injury.  >=22 - Abnormal Male indicating possible myocardial injury.   Clinicians would have to utilize clinical acumen, EKG, Troponin, and serial changes to determine if it is an Acute Myocardial Infarction or myocardial injury due to an underlying chronic condition.         Lactic Acid, Plasma [[id number]]  (Abnormal) Collected: 07/31/24 1849    Specimen: Blood Updated: 07/31/24 1928     Lactate 2.4 mmol/L     Urinalysis With Culture If Indicated - Straight Cath [[id number]]  (Abnormal) Collected: 07/31/24 1851    Specimen: Urine from Straight Cath Updated: 07/31/24 1924     Color, UA Yellow     Appearance, UA Clear     pH, UA <=5.0     Specific Gravity, UA 1.028     Glucose, UA >=1000 mg/dL (3+)     Ketones, UA Trace     Bilirubin, UA Negative     Blood, UA Trace     Protein,  mg/dL (2+)     Leuk Esterase, UA Negative     Nitrite, UA Positive     Urobilinogen, UA 0.2 E.U./dL    Narrative:      In absence of clinical symptoms, the presence of pyuria, bacteria, and/or nitrites on the urinalysis result does not correlate with infection.    Urinalysis, Microscopic Only - Straight Cath [[id number]]   (Abnormal) Collected: 07/31/24 1851    Specimen: Urine from Straight Cath Updated: 07/31/24 1924     RBC, UA 3-5 /HPF      WBC, UA 6-10 /HPF      Bacteria, UA 2+ /HPF      Squamous Epithelial Cells, UA 0-2 /HPF      Hyaline Casts, UA None Seen /LPF      Methodology Manual Light Microscopy    Urine Culture - Urine, Straight Cath [[id number]] Collected: 07/31/24 1851    Specimen: Urine from Straight Cath Updated: 07/31/24 1924    Acetone [[id number]]  (Abnormal) Collected: 07/31/24 1849    Specimen: Blood Updated: 07/31/24 1910     Acetone Small    Sedimentation Rate [[id number]]  (Abnormal) Collected: 07/31/24 1849    Specimen: Blood Updated: 07/31/24 1910     Sed Rate 57 mm/hr     CBC & Differential [[id number]]  (Abnormal) Collected: 07/31/24 1849    Specimen: Blood Updated: 07/31/24 1906    Narrative:      The following orders were created for panel order CBC & Differential.  Procedure                               Abnormality         Status                     ---------                               -----------         ------                     CBC Auto Differential[[id number]]        Abnormal            Final result                 Please view results for these tests on the individual orders.    CBC Auto Differential [[id number]]  (Abnormal) Collected: 07/31/24 1849    Specimen: Blood Updated: 07/31/24 1906     WBC 15.48 10*3/mm3      RBC 3.55 10*6/mm3      Hemoglobin 9.8 g/dL      Hematocrit 29.6 %      MCV 83.4 fL      MCH 27.6 pg      MCHC 33.1 g/dL      RDW 14.5 %      RDW-SD 43.8 fl      MPV 13.1 fL      Platelets 201 10*3/mm3      Neutrophil % 88.7 %      Lymphocyte % 2.9 %      Monocyte % 7.1 %      Eosinophil % 0.1 %      Basophil % 0.3 %      Immature Grans % 0.9 %      Neutrophils, Absolute 13.73 10*3/mm3      Lymphocytes, Absolute 0.45 10*3/mm3      Monocytes, Absolute 1.10 10*3/mm3      Eosinophils, Absolute 0.01 10*3/mm3      Basophils, Absolute 0.05 10*3/mm3      Immature Grans, Absolute 0.14 10*3/mm3      Blood Culture - Blood, Hand, Left [[id number]] Collected: 07/31/24 1849    Specimen: Blood from Hand, Left Updated: 07/31/24 1902    Blood Culture - Blood, Arm, Left [[id number]] Collected: 07/31/24 1849    Specimen: Blood from Arm, Left Updated: 07/31/24 1902            Estimated Creatinine Clearance: 43.2 mL/min (A) (by C-G formula based on SCr of 2.28 mg/dL (H)).    Culture Results:    Microbiology Results (last 10 days)       Procedure Component Value - Date/Time    MRSA Screen, PCR (Inpatient) - Swab, Nares [[id number]]  (Normal) Collected: 08/01/24 0206    Lab Status: Final result Specimen: Swab from Nares Updated: 08/01/24 0346     MRSA PCR No MRSA Detected    Narrative:      The negative predictive value of this diagnostic test is high and should only be used to consider de-escalating anti-MRSA therapy. A positive result may indicate colonization with MRSA and must be correlated clinically.               Radiology:     Foot x-rays reviewed.  Noted areas of concern for foreign body versus artifact.  No gas in soft tissue appreciated.      Imaging Results (Last 72 Hours)       Procedure Component Value Units Date/Time    CT Head Without Contrast [[id number]] Collected: 08/01/24 0524     Updated: 08/01/24 0531    Narrative:      EXAMINATION: CT HEAD WO CONTRAST-      7/31/2024 10:41 PM     HISTORY: altered mental status; M86.9-Osteomyelitis, unspecified;  E11.10-Type 2 diabetes mellitus with ketoacidosis without coma;  R79.89-Other specified abnormal findings of blood chemistry;  I48.91-Unspecified atrial fibrillation; R41.0-Disorientation,  unspecified     In order to have a CT radiation dose as low as reasonably achievable  Automated Exposure Control was utilized for adjustment of the mA and/or  KV according to patient size.     Total DLP = 783.72 mGy.cm     The CT scan of the head is performed without intravenous contrast  enhancement.     The images are acquired in axial plane and subsequent  reconstruction  with coronal and sagittal planes.     Comparison is made with the previous study dated 5/3/2024.     There is no evidence of a mass. There is no midline shift.     There is no evidence of intracranial hemorrhage or hematoma.     Moderately dilated ventricles, basal cisterns and the cortical sulci are  similar to the previous study representing chronic volume loss.     Areas of chronic white matter ischemia bilaterally are noted. The  gray-white matter differentiation is maintained.     Posterior fossa structures are normal.     The images reviewed in bone window show no acute displaced skull  fracture. A subtle nondisplaced fracture or lesion may be obscured due  to motion artifacts. Large mucous retention cyst is seen in the right  maxillary antrum. The remaining paranasal sinuses and mastoid air cells  are clear.       Impression:      1. No acute intracranial abnormality.  2. Chronic ischemic and atrophic changes.  3. Chronic maxillary sinusitis.     The above study was initially reviewed and reported by [facility]. I do not  find any discrepancies.                                      This report was signed and finalized on 8/1/2024 5:27 AM by Dr. Carlos Cutler MD.       CT Abdomen Pelvis With Contrast [[id number]] Resulted: 08/01/24 0737     Updated: 07/31/24 2355    XR Foot 3+ View Left [[id number]] Collected: 07/31/24 1941     Updated: 07/31/24 1950    Narrative:      EXAMINATION:  XR FOOT 3+ VW LEFT-  7/31/2024 6:22 PM     HISTORY: Heel wound.     COMPARISON: 3/26/2024.     TECHNIQUE: 3 views were obtained.     FINDINGS: There is a deep ulcer on the sole of the foot posteriorly. The  ulcer appears to be packed with some type of radiopaque material. On the  lateral image, there may be a small area of bony erosion involving the  calcaneus just posterior to the plantar calcaneal spur. A small area of  osteomyelitis is not ruled out. There has been prior amputation of the  fifth toe and distal  fifth metatarsal. There may have been prior  resection of the distal fourth metacarpal and a portion of the proximal  phalanx of the fourth toe versus chronic erosive change. The appearance  is stable. There is severe narrowing of the first MTP joint. There is  narrowing of some of the interphalangeal joints. There is some spurring  in the tarsal region and at the tarsal-metatarsal junction. There is  soft tissue swelling of the foot diffusely. There is a small curvilinear  foreign body in the soft tissues along the sole of the foot in the  second toe region in the proximal phalanx area. There is another small  linear foreign body in the soft tissues adjacent to the distal phalanx  of the great toe.          Impression:      1. Deep ulcer on the sole of the foot posteriorly near the calcaneus.  There is a questionable small area of bony erosion along the plantar  surface of the calcaneus just proximal to the plantar calcaneal spur.  Osteomyelitis cannot be ruled out. The soft tissue ulcer is packed with  some type of radiopaque material.  2. Linear foreign bodies projected over the soft tissues of the second  toe and first toe. Artifacts are also included in the differential.  3. Other chronic changes, as discussed.        This report was signed and finalized on 7/31/2024 7:47 PM by Dr. Raz Mendes MD.       XR Chest 1 View [[id number]] Collected: 07/31/24 1940     Updated: 07/31/24 1944    Narrative:      EXAMINATION:  XR CHEST 1 VW-  7/31/2024 6:22 PM     HISTORY: Altered mental status. Hypertension and diabetes.     COMPARISON: 6/28/2024.     TECHNIQUE: Single view AP image.     FINDINGS: There is hypoventilation with vascular crowding. There is mild  bronchial wall thickening, stable. There is no dense infiltrate or  effusion. Heart size is borderline. Prior heart bypass surgery. Prior  cervical fusion. No definite acute bony abnormality.          Impression:      1. Hypoventilation with vascular  crowding.  2. Mild bronchial wall thickening, stable.           This report was signed and finalized on 7/31/2024 7:41 PM by Dr. Raz Mendes MD.                 HOSPITAL PROBLEM LIST:      DKA, type 2, not at goal      IMPRESSION:  Confusion on presentation-mental status back to baseline.  Patient with glucose in the 700s on admission.  Likely contributing to his altered mental status.  Suspect infection involving left foot and certainly has portal of entry for bacteria.  Diabetic foot infection with history of osteomyelitis persistent open wound on heel and concerning area of nonviable skin laterally (no rios necrosis) associated with cellulitis.  I had not seen the x-rays of the foot when I examined the patient so did not specifically look at the areas of concern for foreign body  Uncontrolled diabetes mellitus with hemoglobin A1c greater than 12  Complains of abdominal pain-patient has had that off and on previously and has been seen by GI during his admissions.  There is documented colonoscopy from July 2 however patient had inadequate prep.  Leukocytosis-improved  Chronic kidney disease stage IIIb with acute kidney injury      RECOMMENDATION:   Continue vancomycin  Continue zosyn  Follow-up blood cultures  Reevaluate left foot more closely given concerns for foreign body versus artifact on x-rays  Await wound care evaluation-need to monitor lateral ankle closely.  Heel wound appears to be chronic and fairly clean  Diabetes management per intensivist    Reviewed emergency department notes  Reviewed admission history and physical    Julia Adrian MD  08/01/24  07:49 CDT

## 2024-08-01 NOTE — PROGRESS NOTES
[facility] Intensivist Services  Progress Note    Date of Admission: 7/31/2024  Primary Care Physician: Del Shetty MD    Subjective     Chief Complaint: DKA, type 2, not at goal     History of Present Illness    Mr. Luong is a 69-year-old male who presented to [facility] ER via EMS for altered mental status.  It was reported to ER physician by EMS that the patient was was found by his son confused and wandering around in the garage.  Unfortunately family was not available for additional information.   HPI obtained from ER physician, Dr. Dee, who advises that patient presented soiled, confused and unable to provided events leading to his reason for EMS transport.      ER course workup significant for: CBC with elevated WBC of 15.4, low hemoglobin 9.8 and hematocrit 29.6, elevated ESR 57, elevated CRP of 17.  CMP with low sodium 133, elevated BUN of 54 and elevated creatinine 2.64, elevated glucose 704, urine ketones and small amount of acetone. Elevated lactate 2.4 with delta 2.2.  Elevated HS troponin T 342 delta 353.   Urinalysis: Trace blood, positive nitrite, negative leukocytes, +2 bacteria.   CXR: Low lung volumes and vascular crowding.  X-ray of left foot: Ulcer on the sole of the foot posteriorly near the calcaneus.  There is questionable small area of bony erosion along the plantar surface of the calcaneus just proximal to the plantar calcaneal spur.  Osteomyelitis cannot be ruled out.   EKG: A-fib with rapid ventricular response with rate of 128 bpm.      The ICU team was asked to evaluate the patient for AMS, AFIB-RVR, DKA, and open wound to left foot concerning for possible osteomyelitis.      8/1/2024  Patient is more awake this morning and less confused, patient is hemodynamically stable without pressors    Blood cultures has shown gram-positive cocci patient is on Vanco and Zosyn    ID is following him    He is not still in A-fib but his rate controlled patient  initially was on diltiazem drip that was stopped    We have also stopped the insulin drip his anion gap is normal    His white cell count is trending down      Review of Systems   Otherwise complete ROS reviewed and negative except as mentioned in the HPI.    Past Medical History:   Past Medical History:   Diagnosis Date    Arthritis     Autonomic disease     CAD (coronary artery disease) 02/06/2017    Cervical radiculopathy 09/16/2021    Chronic constipation with acute exaccerbation 05/10/2021    Coronary artery disease     Degeneration of cervical intervertebral disc 08/11/2021    Diabetes mellitus     Diabetic foot ulcer 08/31/2020    Diabetic polyneuropathy associated with type 2 diabetes mellitus 01/18/2021    Elevated cholesterol     Gastroesophageal reflux disease 05/13/2019    Headache     HTN (hypertension), benign 05/03/2017    Hyperlipidemia     Hypertension     Mixed hyperlipidemia 02/07/2017    Multiple lung nodules 01/26/2020    5mm, 9 mm RLL identified 1/2020, not present 10/2019.    Myocardial infarction     Osteomyelitis 01/22/2020    Osteomyelitis of fifth toe of right foot 10/07/2019    Pancreatitis     Persistent insomnia 01/20/2020    Renal disorder     Sleep apnea 02/06/2017    Sleep apnea with use of continuous positive airway pressure (CPAP)     NON-COMPLIANT    Slow transit constipation 01/16/2019    Spinal stenosis in cervical region 09/16/2021    Vitamin D deficiency 03/02/2021     Past Surgical History:  Past Surgical History:   Procedure Laterality Date    ABDOMINAL SURGERY      AMPUTATION FOOT / TOE Left 10/2021    5th digit     ANTERIOR CERVICAL DISCECTOMY W/ FUSION N/A 08/05/2022    Procedure: CERVICAL DISCECTOMY ANTERIOR WITH FUSION C5-6 with possible plating of C5-7 with neuromonitoring and 1 c-arm;  Surgeon: Karel Soliz MD;  Location: [facility];  Service: Neurosurgery;  Laterality: N/A;    APPENDECTOMY      BACK SURGERY      CARDIAC CATHETERIZATION Left 02/08/2021     Procedure: Left Heart Cath w poss intervention left anatomical snuff box acess;  Surgeon: Omkar Charles DO;  Location: [facility] CATH INVASIVE LOCATION;  Service: Cardiology;  Laterality: Left;    CARDIAC SURGERY      CATARACT EXTRACTION      CERVICAL SPINE SURGERY      COLONOSCOPY N/A 01/31/2017    Normal exam repeat in 5 years    COLONOSCOPY N/A 02/11/2019    Mild acute inflammation    COLONOSCOPY N/A 04/07/2024    2 areas at 10 and 20 cm with friability ulceration 2 clips placed at 20 cm and 4 clips at 10 cm poor prep normal mucosa,mild eroisions and ulcerations in visible vessels    COLONOSCOPY N/A 7/1/2024    Procedure: COLONOSCOPY WITH ANESTHESIA;  Surgeon: Arsalan Lorenzo DO;  Location: [facility] ENDOSCOPY;  Service: Gastroenterology;  Laterality: N/A;  pre op constipation/diarrhea  post poor prep  pcp Del Shetty    COLONOSCOPY N/A 7/2/2024    Procedure: COLONOSCOPY WITH ANESTHESIA;  Surgeon: Agapito Christopher MD;  Location: [facility] ENDOSCOPY;  Service: Gastroenterology;  Laterality: N/A;  pre rectal bleeding  post poor prep  pcp Del Shetty MD    COLONOSCOPY W/ POLYPECTOMY  03/04/2014    Hyperplastic polyp    CORONARY ARTERY BYPASS GRAFT  10/2015    ENDOSCOPY  04/13/2011    Gastritis with hemorrhage    ENDOSCOPY N/A 05/05/2017    Normal exam    ENDOSCOPY N/A 02/11/2019    Gastritis    ENDOSCOPY N/A 09/01/2020    Non-erosive gastritis with hemorrhage    ENDOSCOPY N/A 02/10/2021    Esophagitis    ENDOSCOPY N/A 04/11/2024    There were esophageal mucosal changes suspicious for short-segment Low's esophagus present in the distal esophagus. The maximum longitudinal extent of these mucosal changes was 2 cm in length. Mucosa was biopsied with a cold forceps for histologyDistal esophagus, biopsies: Mild chronic active esophagogastritis. No evidence of intestinal metaplasia, dysplasia. Antrum, bx, Mild chronic gastritis    FOOT SURGERY Left     INCISION AND DRAINAGE OF WOUND Left 09/2007     "spider bite     Social History:  reports that he quit smoking about 33 years ago. His smoking use included cigarettes. He has never used smokeless tobacco. He reports that he does not drink alcohol and does not use drugs.    Family History: family history includes Alzheimer's disease in his mother; Colon cancer in his father and sister; Colon polyps in his sister; Coronary artery disease in his sister and sister; Heart disease in his father.     Allergies:  Allergies   Allergen Reactions    Cefepime Hives and Anaphylaxis    Bactrim [Sulfamethoxazole-Trimethoprim] Other (See Comments)     \"RENAL FAILURE\"    Vancomycin Itching    Zolpidem Mental Status Change     \"makes him crazy\"    Metronidazole Rash       Medications:  Prior to Admission medications    Medication Sig Start Date End Date Taking? Authorizing Provider   ascorbic acid (VITAMIN C) 1000 MG tablet Take 1 tablet by mouth Daily. 8/25/23      bumetanide (BUMEX) 1 MG tablet Take 1 tablet by mouth 2 (Two) Times a Day for 30 days. 7/3/24 8/2/24  Josué De Oliveira MD   busPIRone (BUSPAR) 10 MG tablet Take 1 tablet by mouth 2 (Two) Times a Day.    Provider, MD Bossman   calcitriol (ROCALTROL) 0.5 MCG capsule Take 1 capsule by mouth Daily. 2/23/23      carvedilol (COREG) 3.125 MG tablet Take 1 tablet twice a day by oral route. 3/26/24      Diclofenac Sodium (VOLTAREN) 1 % gel gel Apply 2 g topically to the appropriate area as directed 4 (Four) Times a Day As Needed. 6/1/23      donepezil (ARICEPT) 10 MG tablet Take 1 tablet by mouth Daily. 5/3/24      DULoxetine (CYMBALTA) 60 MG capsule Take 1 capsule by mouth Daily. 3/11/24      famotidine (PEPCID) 20 MG tablet Take 1 tablet twice a day by oral route. 3/26/24      fluticasone (FLONASE) 50 MCG/ACT nasal spray Instill 1 spray in each nostril as directed by provider Daily. 4/18/24      Insulin Glargine (Lantus SoloStar) 100 UNIT/ML injection pen Inject 20 Units under the skin into the appropriate area as " directed every night at bedtime. 5/9/24      Insulin Regular Human, Conc, (HumuLIN R) 500 UNIT/ML solution pen-injector CONCENTRATED injection Inject 15 Units under the skin into the appropriate area as directed 3 (Three) Times a Day Before Meals.    Bossman Blount MD   Insulin Regular Human, Conc, (HumuLIN R) 500 UNIT/ML solution pen-injector CONCENTRATED injection Inject 40 Units under the skin into the appropriate area as directed 3 (Three) Times a Day Before Meals. Inject 40 units under the skin in the the appropriate area with regular meals AND 40 units with large meals.    Bossman Blount MD   Iron-Vitamin C (Vitron-C)  MG tablet Take 1 tablet twice a day by oral route. 4/18/24      levocetirizine (XYZAL) 5 MG tablet Take 1 tablet by mouth every day at bedtime. 5/9/24      melatonin 3 MG tablet Take 2 tablets by mouth At Night As Needed for Sleep. 4/11/24   Rene Maloney MD   nitroglycerin (NITROSTAT) 0.4 MG SL tablet Place 1 tablet under the tongue Every 5 (Five) Minutes As Needed for Chest Pain. Take no more than 3 doses in 15 minutes.    Bossman Blount MD   oxyCODONE (ROXICODONE) 10 MG tablet Take 1 tablet by mouth 2 (Two) Times a Day As Needed. Must last 30 days per md. 7/24/24      pantoprazole (PROTONIX) 40 MG EC tablet Take 1 tablet by mouth Every 12 (Twelve) Hours. 6/19/24      polyethylene glycol (MIRALAX) 17 GM/SCOOP powder Take 17 g by mouth Daily As Needed (constipation).    Bossman Blount MD   pregabalin (LYRICA) 100 MG capsule Take 2 capsules by mouth Every Night.    Bossman Blount MD   pregabalin (LYRICA) 100 MG capsule Take 1 capsule by mouth Every Morning, THEN 2 capsules Every Evening. 7/24/24 9/21/24     rosuvastatin (CRESTOR) 10 MG tablet Take 1 tablet by mouth Every Night. 5/9/24      sennosides-docusate (PERICOLACE) 8.6-50 MG per tablet Take 1 tablet by mouth Every Night. Obtain OTC 8/23/23   Lexie Houston APRN   sodium hypochlorite (DAKIN'S  "1/4 STRENGTH) 0.125 % solution topical solution 0.125% Apply 1 application topically to the appropriate area as directed 2 (Two) Times a Day. 5/13/24      sucralfate (CARAFATE) 1 g tablet Take 1 tablet by mouth 4 (Four) Times a Day before meals. 5/3/24      tamsulosin (FLOMAX) 0.4 MG capsule 24 hr capsule Take 1 capsule by mouth Daily. 8/7/23      spironolactone (ALDACTONE) 25 MG tablet Take 1 tablet by mouth Daily. 9/28/20 12/31/20  Del Shetty MD     I have utilized all available immediate resources to obtain, update, or review the patient's current medications (including all prescriptions, over-the-counter products, herbals, cannabis/cannabidiol products, and vitamin/mineral/dietary (nutritional) supplements).    Objective     Vital Signs: /75   Pulse 72   Temp 96.4 °F (35.8 °C) (Axillary)   Resp 16   Ht 182.9 cm (72\")   Wt 130 kg (286 lb 13.1 oz)   SpO2 97%   BMI 38.90 kg/m²   Physical Exam   General :patient is comfortable not in distress    Head exam: Atraumatic ,normocephalic    Neck exam: No rigidity    Cardiac exam : normal cardiac sounds, no murmurs, no added sounds, normal rate and rhythm    Chest exam : Normal breath sounds, no crepitation, no rhonchi, normal work of breathing    Abdominal exam: Soft abdomen, no organomegaly, no tenderness    Skin exam: No rashes, no petechiae, left foot ulcer and wound      Results Reviewed:    Lab Results (last 24 hours)       Procedure Component Value Units Date/Time    Iron Profile [[id number]] Collected: 08/01/24 0419    Specimen: Blood Updated: 08/01/24 1148    POC Glucose Once [[id number]]  (Abnormal) Collected: 08/01/24 1123    Specimen: Blood Updated: 08/01/24 1134     Glucose 242 mg/dL      Comment: : [id number] Yesika Tahira Florecita ID: [id number]       Blood Culture ID, PCR - Blood, Arm, Left [[id number]]  (Abnormal) Collected: 07/31/24 1849    Specimen: Blood from Arm, Left Updated: 08/01/24 1117     BCID, PCR Staph spp, not aureus or " anay. Identification by BCID2 PCR.     BOTTLE TYPE Aerobic Bottle    Blood Culture - Blood, Arm, Left [[id number]]  (Abnormal) Collected: 07/31/24 1849    Specimen: Blood from Arm, Left Updated: 08/01/24 1006     Blood Culture Abnormal Stain     Gram Stain Aerobic Bottle Gram positive cocci in clusters    POC Glucose Once [[id number]]  (Abnormal) Collected: 08/01/24 0823    Specimen: Blood Updated: 08/01/24 0834     Glucose 319 mg/dL      Comment: : [id number] Yesika Bernabe ID: [id number]       POC Glucose Once [[id number]]  (Abnormal) Collected: 08/01/24 0556    Specimen: Blood Updated: 08/01/24 0607     Glucose 338 mg/dL      Comment: : [id number] Thee Mcmahon ID: [id number]       Basic Metabolic Panel [[id number]]  (Abnormal) Collected: 08/01/24 0419    Specimen: Blood Updated: 08/01/24 0515     Glucose 370 mg/dL      BUN 50 mg/dL      Creatinine 2.28 mg/dL      Sodium 138 mmol/L      Potassium 4.1 mmol/L      Chloride 103 mmol/L      CO2 21.0 mmol/L      Calcium 8.8 mg/dL      BUN/Creatinine Ratio 21.9     Anion Gap 14.0 mmol/L      eGFR 30.5 mL/min/1.73     Narrative:      GFR Normal >60  Chronic Kidney Disease <60  Kidney Failure <15      Magnesium [[id number]]  (Abnormal) Collected: 08/01/24 0419    Specimen: Blood Updated: 08/01/24 0515     Magnesium 2.5 mg/dL     Phosphorus [[id number]]  (Normal) Collected: 08/01/24 0419    Specimen: Blood Updated: 08/01/24 0512     Phosphorus 3.6 mg/dL     CBC & Differential [[id number]]  (Abnormal) Collected: 08/01/24 0419    Specimen: Blood Updated: 08/01/24 0452    Narrative:      The following orders were created for panel order CBC & Differential.  Procedure                               Abnormality         Status                     ---------                               -----------         ------                     CBC Auto Differential[[id number]]        Abnormal            Final result                 Please view results for these tests on  the individual orders.    CBC Auto Differential [[id number]]  (Abnormal) Collected: 08/01/24 0419    Specimen: Blood Updated: 08/01/24 0452     WBC 12.83 10*3/mm3      RBC 3.38 10*6/mm3      Hemoglobin 9.0 g/dL      Hematocrit 28.3 %      MCV 83.7 fL      MCH 26.6 pg      MCHC 31.8 g/dL      RDW 14.5 %      RDW-SD 44.3 fl      MPV 12.7 fL      Platelets 176 10*3/mm3      Neutrophil % 79.2 %      Lymphocyte % 10.1 %      Monocyte % 9.6 %      Eosinophil % 0.2 %      Basophil % 0.3 %      Immature Grans % 0.6 %      Neutrophils, Absolute 10.16 10*3/mm3      Lymphocytes, Absolute 1.29 10*3/mm3      Monocytes, Absolute 1.23 10*3/mm3      Eosinophils, Absolute 0.03 10*3/mm3      Basophils, Absolute 0.04 10*3/mm3      Immature Grans, Absolute 0.08 10*3/mm3      nRBC 0.0 /100 WBC     MRSA Screen, PCR (Inpatient) - Swab, Nares [[id number]]  (Normal) Collected: 08/01/24 0206    Specimen: Swab from Nares Updated: 08/01/24 0346     MRSA PCR No MRSA Detected    Narrative:      The negative predictive value of this diagnostic test is high and should only be used to consider de-escalating anti-MRSA therapy. A positive result may indicate colonization with MRSA and must be correlated clinically.    Basic Metabolic Panel [[id number]]  (Abnormal) Collected: 08/01/24 0210    Specimen: Blood Updated: 08/01/24 0253     Glucose 379 mg/dL      BUN 51 mg/dL      Creatinine 2.39 mg/dL      Sodium 136 mmol/L      Potassium 4.0 mmol/L      Chloride 103 mmol/L      CO2 19.0 mmol/L      Calcium 8.7 mg/dL      BUN/Creatinine Ratio 21.3     Anion Gap 14.0 mmol/L      eGFR 28.8 mL/min/1.73     Narrative:      GFR Normal >60  Chronic Kidney Disease <60  Kidney Failure <15      STAT Lactic Acid, Reflex [[id number]]  (Normal) Collected: 08/01/24 0210    Specimen: Blood Updated: 08/01/24 0250     Lactate 1.3 mmol/L     POC Glucose Once [[id number]]  (Abnormal) Collected: 08/01/24 0210    Specimen: Blood Updated: 08/01/24 0221     Glucose 360 mg/dL       Comment: : [id number] Thee SamiMeter ID: [id number]       Basic Metabolic Panel [[id number]]  (Abnormal) Collected: 08/01/24 0021    Specimen: Blood Updated: 08/01/24 0053     Glucose 370 mg/dL      BUN 52 mg/dL      Creatinine 2.46 mg/dL      Sodium 138 mmol/L      Potassium 4.1 mmol/L      Comment: Specimen hemolyzed.  Result may be falsely elevated.        Chloride 103 mmol/L      CO2 21.0 mmol/L      Calcium 8.8 mg/dL      BUN/Creatinine Ratio 21.1     Anion Gap 14.0 mmol/L      eGFR 27.8 mL/min/1.73     Narrative:      GFR Normal >60  Chronic Kidney Disease <60  Kidney Failure <15      Phosphorus [[id number]]  (Normal) Collected: 08/01/24 0021    Specimen: Blood Updated: 08/01/24 0043     Phosphorus 3.5 mg/dL     Magnesium [[id number]]  (Normal) Collected: 08/01/24 0021    Specimen: Blood Updated: 08/01/24 0042     Magnesium 2.4 mg/dL     POC Glucose Once [[id number]]  (Abnormal) Collected: 08/01/24 0010    Specimen: Blood Updated: 08/01/24 0022     Glucose 353 mg/dL      Comment: : [id number] Thee SamiMeter ID: [id number]       POC Glucose Once [[id number]]  (Abnormal) Collected: 07/31/24 2323    Specimen: Blood Updated: 07/31/24 2334     Glucose 237 mg/dL      Comment: : [id number] Thee SamiMeter ID: [id number]       STAT Lactic Acid, Reflex [[id number]]  (Abnormal) Collected: 07/31/24 2240    Specimen: Blood Updated: 07/31/24 2300     Lactate 2.2 mmol/L     POC Glucose Once [[id number]]  (Abnormal) Collected: 07/31/24 2238    Specimen: Blood Updated: 07/31/24 2250     Glucose 369 mg/dL      Comment: : [id number] Thee SamiMeter ID: [id number]       POC Glucose Once [[id number]]  (Abnormal) Collected: 07/31/24 2124    Specimen: Blood Updated: 07/31/24 2136     Glucose 543 mg/dL      Comment: : [id number] Zafar AbigailMeter ID: [id number]       Basic Metabolic Panel [[id number]]  (Abnormal) Collected: 07/31/24 2030    Specimen: Blood Updated: 07/31/24 2111     Glucose 653 mg/dL       BUN 54 mg/dL      Creatinine 2.64 mg/dL      Sodium 133 mmol/L      Potassium 4.4 mmol/L      Chloride 98 mmol/L      CO2 20.0 mmol/L      Calcium 8.9 mg/dL      BUN/Creatinine Ratio 20.5     Anion Gap 15.0 mmol/L      eGFR 25.6 mL/min/1.73     Narrative:      GFR Normal >60  Chronic Kidney Disease <60  Kidney Failure <15      Phosphorus [[id number]]  (Normal) Collected: 07/31/24 1849    Specimen: Blood Updated: 07/31/24 2110     Phosphorus 2.7 mg/dL     Magnesium [[id number]]  (Normal) Collected: 07/31/24 1849    Specimen: Blood Updated: 07/31/24 2110     Magnesium 2.2 mg/dL     Osmolality, Serum [[id number]]  (Abnormal) Collected: 07/31/24 2030    Specimen: Blood Updated: 07/31/24 2103     Osmolality 329 mOsm/kg     High Sensitivity Troponin T 2Hr [[id number]]  (Abnormal) Collected: 07/31/24 2030    Specimen: Blood Updated: 07/31/24 2058     HS Troponin T 353 ng/L      Troponin T Delta 11 ng/L     Narrative:      High Sensitive Troponin T Reference Range:  <14.0 ng/L- Negative Female for AMI  <22.0 ng/L- Negative Male for AMI  >=14 - Abnormal Female indicating possible myocardial injury.  >=22 - Abnormal Male indicating possible myocardial injury.   Clinicians would have to utilize clinical acumen, EKG, Troponin, and serial changes to determine if it is an Acute Myocardial Infarction or myocardial injury due to an underlying chronic condition.         Magnesium [[id number]]  (Normal) Collected: 07/31/24 2030    Specimen: Blood Updated: 07/31/24 2056     Magnesium 2.3 mg/dL     Phosphorus [[id number]]  (Normal) Collected: 07/31/24 2030    Specimen: Blood Updated: 07/31/24 2054     Phosphorus 3.1 mg/dL     POC Glucose Once [[id number]]  (Abnormal) Collected: 07/31/24 2021    Specimen: Blood Updated: 07/31/24 2033     Glucose 575 mg/dL      Comment: : [id number] Davis SaraMeter ID: [id number]       Blood Gas, Venous - [[id number]]  (Abnormal) Collected: 07/31/24 2021    Specimen: Venous Blood Updated: 07/31/24 2021      Site OTHER     pH, Venous 7.410 pH Units      pCO2, Venous 32.2 mm Hg      Comment: 84 Value below reference range        pO2, Venous 55.6 mm Hg      Comment: 83 Value above reference range        HCO3, Venous 20.4 mmol/L      Comment: 84 Value below reference range        Base Excess, Venous -3.6 mmol/L      Comment: 84 Value below reference range        O2 Saturation, Venous 89.9 %      Comment: 83 Value above reference range        Temperature 37.0     Barometric Pressure for Blood Gas 751 mmHg      Modality Room Air     FIO2 21 %      Ventilator Mode NA     Collected by [id number]     Comment: Meter: [id number]     :  [email or web address]       Hemoglobin A1c [[id number]]  (Abnormal) Collected: 07/31/24 1849    Specimen: Blood Updated: 07/31/24 2004     Hemoglobin A1C 12.60 %     Narrative:      Hemoglobin A1C Ranges:    Increased Risk for Diabetes  5.7% to 6.4%  Diabetes                     >= 6.5%  Diabetic Goal                < 7.0%    Comprehensive Metabolic Panel [[id number]]  (Abnormal) Collected: 07/31/24 1849    Specimen: Blood Updated: 07/31/24 1944     Glucose 704 mg/dL      BUN 55 mg/dL      Creatinine 2.67 mg/dL      Sodium 132 mmol/L      Potassium 4.5 mmol/L      Chloride 97 mmol/L      CO2 19.0 mmol/L      Calcium 9.0 mg/dL      Total Protein 6.7 g/dL      Albumin 3.5 g/dL      ALT (SGPT) 8 U/L      AST (SGOT) 11 U/L      Alkaline Phosphatase 101 U/L      Total Bilirubin 0.6 mg/dL      Globulin 3.2 gm/dL      A/G Ratio 1.1 g/dL      BUN/Creatinine Ratio 20.6     Anion Gap 16.0 mmol/L      eGFR 25.2 mL/min/1.73     Narrative:      GFR Normal >60  Chronic Kidney Disease <60  Kidney Failure <15      C-reactive Protein [[id number]]  (Abnormal) Collected: 07/31/24 1849    Specimen: Blood Updated: 07/31/24 1932     C-Reactive Protein 17.04 mg/dL     High Sensitivity Troponin T [[id number]]  (Abnormal) Collected: 07/31/24 1849    Specimen: Blood Updated: 07/31/24 1929     HS Troponin T 342 ng/L      Narrative:      High Sensitive Troponin T Reference Range:  <14.0 ng/L- Negative Female for AMI  <22.0 ng/L- Negative Male for AMI  >=14 - Abnormal Female indicating possible myocardial injury.  >=22 - Abnormal Male indicating possible myocardial injury.   Clinicians would have to utilize clinical acumen, EKG, Troponin, and serial changes to determine if it is an Acute Myocardial Infarction or myocardial injury due to an underlying chronic condition.         Lactic Acid, Plasma [[id number]]  (Abnormal) Collected: 07/31/24 1849    Specimen: Blood Updated: 07/31/24 1928     Lactate 2.4 mmol/L     Urinalysis With Culture If Indicated - Straight Cath [[id number]]  (Abnormal) Collected: 07/31/24 1851    Specimen: Urine from Straight Cath Updated: 07/31/24 1924     Color, UA Yellow     Appearance, UA Clear     pH, UA <=5.0     Specific Gravity, UA 1.028     Glucose, UA >=1000 mg/dL (3+)     Ketones, UA Trace     Bilirubin, UA Negative     Blood, UA Trace     Protein,  mg/dL (2+)     Leuk Esterase, UA Negative     Nitrite, UA Positive     Urobilinogen, UA 0.2 E.U./dL    Narrative:      In absence of clinical symptoms, the presence of pyuria, bacteria, and/or nitrites on the urinalysis result does not correlate with infection.    Urinalysis, Microscopic Only - Straight Cath [[id number]]  (Abnormal) Collected: 07/31/24 1851    Specimen: Urine from Straight Cath Updated: 07/31/24 1924     RBC, UA 3-5 /HPF      WBC, UA 6-10 /HPF      Bacteria, UA 2+ /HPF      Squamous Epithelial Cells, UA 0-2 /HPF      Hyaline Casts, UA None Seen /LPF      Methodology Manual Light Microscopy    Urine Culture - Urine, Straight Cath [[id number]] Collected: 07/31/24 1851    Specimen: Urine from Straight Cath Updated: 07/31/24 1924    Acetone [[id number]]  (Abnormal) Collected: 07/31/24 1849    Specimen: Blood Updated: 07/31/24 1910     Acetone Small    Sedimentation Rate [[id number]]  (Abnormal) Collected: 07/31/24 1849    Specimen: Blood  Updated: 07/31/24 1910     Sed Rate 57 mm/hr     CBC & Differential [[id number]]  (Abnormal) Collected: 07/31/24 1849    Specimen: Blood Updated: 07/31/24 1906    Narrative:      The following orders were created for panel order CBC & Differential.  Procedure                               Abnormality         Status                     ---------                               -----------         ------                     CBC Auto Differential[[id number]]        Abnormal            Final result                 Please view results for these tests on the individual orders.    CBC Auto Differential [[id number]]  (Abnormal) Collected: 07/31/24 1849    Specimen: Blood Updated: 07/31/24 1906     WBC 15.48 10*3/mm3      RBC 3.55 10*6/mm3      Hemoglobin 9.8 g/dL      Hematocrit 29.6 %      MCV 83.4 fL      MCH 27.6 pg      MCHC 33.1 g/dL      RDW 14.5 %      RDW-SD 43.8 fl      MPV 13.1 fL      Platelets 201 10*3/mm3      Neutrophil % 88.7 %      Lymphocyte % 2.9 %      Monocyte % 7.1 %      Eosinophil % 0.1 %      Basophil % 0.3 %      Immature Grans % 0.9 %      Neutrophils, Absolute 13.73 10*3/mm3      Lymphocytes, Absolute 0.45 10*3/mm3      Monocytes, Absolute 1.10 10*3/mm3      Eosinophils, Absolute 0.01 10*3/mm3      Basophils, Absolute 0.05 10*3/mm3      Immature Grans, Absolute 0.14 10*3/mm3     Blood Culture - Blood, Hand, Left [[id number]] Collected: 07/31/24 1849    Specimen: Blood from Hand, Left Updated: 07/31/24 1902            CT Abdomen Pelvis With Contrast    Result Date: 8/1/2024  EXAMINATION: CT ABDOMEN PELVIS W CONTRAST-   7/31/2024 10:41 PM  HISTORY: abdominal distention with pain; M86.9-Osteomyelitis, unspecified; E11.10-Type 2 diabetes mellitus with ketoacidosis without coma; R79.89-Other specified abnormal findings of blood chemistry; I48.91-Unspecified atrial fibrillation; R41.0-Disorientation, unspecified  In order to have a CT radiation dose as low as reasonably achievable Automated Exposure Control  was utilized for adjustment of the mA and/or KV according to patient size.  Total DLP = 1841.72 mGy.cm  The CT scan of the abdomen and pelvis is performed after intravenous contrast enhancement.  The images are acquired in axial plane and subsequent reconstruction in coronal and sagittal planes.  Comparison is made with the previous study dated 6/27/2024.  The lung bases included in the study show a trace right and small left basal pleural effusion. There are mild atelectatic changes at bilateral bases left more than the right.  Limited visualized cardiomediastinal structures show atheromatous changes of the coronary arteries. There is moderate cardiomegaly.  The liver and spleen are normal.  The gallbladder is surgically absent.  Fatty infiltrated pancreas seen. No focal abnormality. No ductal dilatation. The adrenal glands are normal.  There is persistent bilateral significant perinephric fat infiltration and thickening of the pararenal fascia which is similar to the previous study. Bilateral nephrogram is normal and symmetrical. No calculi. No hydronephrosis. There is a well-defined sharply marginated low density exophytic mass from the lower pole of the right kidney measuring 4.4 cm in diameter. CT density suggest a cyst. Limited visualized ureters are normal and nondilated. The urinary bladder is well distended. No intrinsic abnormality.  Prostate is not significantly enlarged.  There are small fat-containing inguinal hernias, right larger than the left.  There is subcutaneous fat infiltration of the entire abdominal wall extending into the lower extremity. This may represent a fluid overload?.  The stomach is decompressed with moderate wall thickening. No focal abnormality [place]. Duodenum is normal. Small bowel is nondistended and nondilated. Appendix is surgically absent. There is significant large volume stool throughout the colon. No finding to suggest obstruction.  Atheromatous changes of the abdominal  aorta and iliac arteries. No aneurysmal dilatation.  Moderately prominent nonspecific retroperitoneal para-aortic lymph nodes predominantly in the mid abdomen. A referenced left para iliac lymph node, image #57 in series 2 and image #40 and series 3, measures 1.6 cm in short axis. There is a large lymph node in the left lower anterior pelvis/external iliac group measuring 1.3 cm in short axis. There are moderately prominent inguinal lymph nodes. A left inguinal lymph node measures 2 cm in short axis.  Images reviewed in bone window show chronic degenerative changes of the lumbar spine. No acute bony abnormality.      Impression: 1. A significant perinephric fat stranding is similar to the previous study and is a nonspecific finding. Possibility for chronic inflammatory process/chronic pyelonephritis may not be excluded. Renal functions are normal and symmetrical. 2. Fatty infiltration of the pancreas. No mass. No ductal dilatation. 3. Nonspecific abdominal, pelvic and inguinal lymphadenopathy. The etiology and clinical significance is not certain. This appears moderately more progressive since the previous study. 4. Diffuse subcutaneous fat infiltration of the abdominal wall extending into the lower extremity may represent fluid overload?. 5. A significant large volume of stool in the colon without evidence of obstruction. This may represent constipation.  The above study was initially reviewed and reported by [facility]. I do not find any discrepancies.           This report was signed and finalized on 8/1/2024 7:50 AM by Dr. Carlos Cutler MD.      CT Head Without Contrast    Result Date: 8/1/2024  EXAMINATION: CT HEAD WO CONTRAST-   7/31/2024 10:41 PM  HISTORY: altered mental status; M86.9-Osteomyelitis, unspecified; E11.10-Type 2 diabetes mellitus with ketoacidosis without coma; R79.89-Other specified abnormal findings of blood chemistry; I48.91-Unspecified atrial fibrillation; R41.0-Disorientation, unspecified   In order to have a CT radiation dose as low as reasonably achievable Automated Exposure Control was utilized for adjustment of the mA and/or KV according to patient size.  Total DLP = 783.72 mGy.cm  The CT scan of the head is performed without intravenous contrast enhancement.  The images are acquired in axial plane and subsequent reconstruction with coronal and sagittal planes.  Comparison is made with the previous study dated 5/3/2024.  There is no evidence of a mass. There is no midline shift.  There is no evidence of intracranial hemorrhage or hematoma.  Moderately dilated ventricles, basal cisterns and the cortical sulci are similar to the previous study representing chronic volume loss.  Areas of chronic white matter ischemia bilaterally are noted. The gray-white matter differentiation is maintained.  Posterior fossa structures are normal.  The images reviewed in bone window show no acute displaced skull fracture. A subtle nondisplaced fracture or lesion may be obscured due to motion artifacts. Large mucous retention cyst is seen in the right maxillary antrum. The remaining paranasal sinuses and mastoid air cells are clear.      Impression: 1. No acute intracranial abnormality. 2. Chronic ischemic and atrophic changes. 3. Chronic maxillary sinusitis.  The above study was initially reviewed and reported by [facility]. I do not find any discrepancies.             This report was signed and finalized on 8/1/2024 5:27 AM by Dr. Carlos Cutler MD.      XR Foot 3+ View Left    Result Date: 7/31/2024  EXAMINATION:  XR FOOT 3+ VW LEFT-  7/31/2024 6:22 PM  HISTORY: Heel wound.  COMPARISON: 3/26/2024.  TECHNIQUE: 3 views were obtained.  FINDINGS: There is a deep ulcer on the sole of the foot posteriorly. The ulcer appears to be packed with some type of radiopaque material. On the lateral image, there may be a small area of bony erosion involving the calcaneus just posterior to the plantar calcaneal spur. A small area of  osteomyelitis is not ruled out. There has been prior amputation of the fifth toe and distal fifth metatarsal. There may have been prior resection of the distal fourth metacarpal and a portion of the proximal phalanx of the fourth toe versus chronic erosive change. The appearance is stable. There is severe narrowing of the first MTP joint. There is narrowing of some of the interphalangeal joints. There is some spurring in the tarsal region and at the tarsal-metatarsal junction. There is soft tissue swelling of the foot diffusely. There is a small curvilinear foreign body in the soft tissues along the sole of the foot in the second toe region in the proximal phalanx area. There is another small linear foreign body in the soft tissues adjacent to the distal phalanx of the great toe.       Impression: 1. Deep ulcer on the sole of the foot posteriorly near the calcaneus. There is a questionable small area of bony erosion along the plantar surface of the calcaneus just proximal to the plantar calcaneal spur. Osteomyelitis cannot be ruled out. The soft tissue ulcer is packed with some type of radiopaque material. 2. Linear foreign bodies projected over the soft tissues of the second toe and first toe. Artifacts are also included in the differential. 3. Other chronic changes, as discussed.   This report was signed and finalized on 7/31/2024 7:47 PM by Dr. Raz Mendes MD.      XR Chest 1 View    Result Date: 7/31/2024  EXAMINATION:  XR CHEST 1 VW-  7/31/2024 6:22 PM  HISTORY: Altered mental status. Hypertension and diabetes.  COMPARISON: 6/28/2024.  TECHNIQUE: Single view AP image.  FINDINGS: There is hypoventilation with vascular crowding. There is mild bronchial wall thickening, stable. There is no dense infiltrate or effusion. Heart size is borderline. Prior heart bypass surgery. Prior cervical fusion. No definite acute bony abnormality.       Impression: 1. Hypoventilation with vascular crowding. 2. Mild bronchial  wall thickening, stable.    This report was signed and finalized on 7/31/2024 7:41 PM by Dr. Raz Mendes MD.       Assessment / Plan   Assessment and Plan    Active Hospital Problems    Diagnosis     **DKA, type 2, not at goal      AFIB-RVR  Patient initially came with RVR he was on diltiazem drip which was stopped currently he is in A-fib but rate controlled and hemodynamically stable     DKA that has resolved currently the patient is on subcu insulin the IV insulin was stopped    Urinary Tract Infection  Possible UTI no evidence of obstruction patient is already on broad-spectrum antibiotics and including Zosyn and vancomycin     Diabetic foot ulcer  -chronic open wound, DC ulcer stage 2-3 to left heal with larger area soft pale tissue with surrounding edema/erythema and weeping at the outer aspect of the foot along the area of the 5th metatarsal.   -Hx of osteomyelitis and related MRSA bacteremia   -Zosyn/Vanco started in ED will continue   -podiatry consult  -wound care consult  ID is consulted     Altered mental status  Secondary to sepsis which is improving   Abdominal pain  -reported nausea/vomiting. Abdominal distention and tender on exam  -Hx of pancreatitis  -previous appendectomy  -Hx of GI bleeding, no active bleeding noted  -No CT abd/pel in ER  -CT abd/pel with contras no evidence of renal obstruction but the possibility of questionable chronic fatty stranding around the kidney which is chronic     CKD-IV  -bun/creatinine at baseline  -monitor lytes and urine output   -avoid neph toxic medications        VTE Prophylaxis:     Pharmacologic VTE prophylaxis orders are present.      I provided 55 minutes of total critical care time. Due to the high probability of clinically significant, life-threatening deterioration, the patient required my direct and personal management. The critical care time does not include time spent on separately billable procedures.  Code Status:    .     Electronically signed  by Keren Jamison MD on 8/1/2024 at 11:51 CDT

## 2024-08-01 NOTE — PLAN OF CARE
Goal Outcome Evaluation:              Outcome Evaluation: VSS. Wound care consult for L heel. Room air. A-fib. Alert to self and place only. Following commands. Home meds sent with pharmacy. ACHS accuchecks with SQ insulin per order. Cardizem drip off              Problem: Fall Injury Risk  Goal: Absence of Fall and Fall-Related Injury  Outcome: Ongoing, Progressing  Intervention: Identify and Manage Contributors  Recent Flowsheet Documentation  Taken 8/1/2024 0600 by Ruy Kingston RN  Medication Review/Management: medications reviewed  Taken 8/1/2024 0500 by Ruy Kingston RN  Medication Review/Management: medications reviewed  Taken 8/1/2024 0400 by Ruy Kingston RN  Medication Review/Management: medications reviewed  Taken 8/1/2024 0300 by Ruy Kingston RN  Medication Review/Management: medications reviewed  Taken 8/1/2024 0100 by Ruy Kingston RN  Medication Review/Management: medications reviewed  Taken 7/31/2024 2300 by Ruy Kingston RN  Medication Review/Management: medications reviewed  Intervention: Promote Injury-Free Environment  Recent Flowsheet Documentation  Taken 8/1/2024 0600 by Ruy Kingston RN  Safety Promotion/Fall Prevention: safety round/check completed  Taken 8/1/2024 0500 by Ruy Kingston RN  Safety Promotion/Fall Prevention: safety round/check completed  Taken 8/1/2024 0400 by Ruy Kingston RN  Safety Promotion/Fall Prevention: safety round/check completed  Taken 8/1/2024 0300 by Ruy Kingston RN  Safety Promotion/Fall Prevention: safety round/check completed  Taken 8/1/2024 0100 by Ruy Kingston RN  Safety Promotion/Fall Prevention: safety round/check completed  Taken 7/31/2024 2300 by Ruy Kingston RN  Safety Promotion/Fall Prevention: safety round/check completed     Problem: Skin Injury Risk Increased  Goal: Skin Health and Integrity  Outcome: Ongoing, Progressing  Intervention: Optimize Skin Protection  Recent Flowsheet Documentation  Taken 8/1/2024 0500 by  Ruy Kingston RN  Head of Bed (HOB) Positioning: HOB at 30-45 degrees  Taken 8/1/2024 0300 by Ruy Kingston RN  Head of Bed (HOB) Positioning: HOB at 30-45 degrees  Taken 8/1/2024 0100 by Ruy Kingston RN  Head of Bed (HOB) Positioning: HOB at 30-45 degrees  Taken 7/31/2024 2300 by Ruy Kingston RN  Head of Bed (HOB) Positioning: HOB at 30-45 degrees     Problem: Adult Inpatient Plan of Care  Goal: Plan of Care Review  Outcome: Ongoing, Progressing  Flowsheets (Taken 8/1/2024 0636)  Outcome Evaluation: VSS. Wound care consult for L heel. Room air. A-fib. Alert to self and place only. Following commands. Home meds sent with pharmacy. ACHS accuchecks with SQ insulin per order. Cardizem drip off  Goal: Patient-Specific Goal (Individualized)  Outcome: Ongoing, Progressing  Goal: Absence of Hospital-Acquired Illness or Injury  Outcome: Ongoing, Progressing  Intervention: Identify and Manage Fall Risk  Recent Flowsheet Documentation  Taken 8/1/2024 0600 by Ruy Kingston RN  Safety Promotion/Fall Prevention: safety round/check completed  Taken 8/1/2024 0500 by Ruy Kingston RN  Safety Promotion/Fall Prevention: safety round/check completed  Taken 8/1/2024 0400 by Ruy Kingston RN  Safety Promotion/Fall Prevention: safety round/check completed  Taken 8/1/2024 0300 by Ruy Kingston RN  Safety Promotion/Fall Prevention: safety round/check completed  Taken 8/1/2024 0100 by Ruy Kingston RN  Safety Promotion/Fall Prevention: safety round/check completed  Taken 7/31/2024 2300 by Ruy Kingston RN  Safety Promotion/Fall Prevention: safety round/check completed  Intervention: Prevent Skin Injury  Recent Flowsheet Documentation  Taken 8/1/2024 0500 by uRy Kingston RN  Body Position: position changed independently  Taken 8/1/2024 0300 by Ruy Kingston RN  Body Position:   turned   right   side-lying  Taken 8/1/2024 0100 by Ruy Kingston RN  Body Position: supine  Taken 7/31/2024 2300 by Ruy Kingston,  RN  Body Position:   left   side-lying  Intervention: Prevent and Manage VTE (Venous Thromboembolism) Risk  Recent Flowsheet Documentation  Taken 8/1/2024 0400 by Ruy Kingston RN  VTE Prevention/Management:   bilateral   sequential compression devices on  Taken 7/31/2024 2230 by Ruy Kingston RN  VTE Prevention/Management:   bilateral   sequential compression devices on  Goal: Optimal Comfort and Wellbeing  Outcome: Ongoing, Progressing  Goal: Readiness for Transition of Care  Outcome: Ongoing, Progressing  Intervention: Mutually Develop Transition Plan  Recent Flowsheet Documentation  Taken 8/1/2024 0602 by Ruy Kingston RN  Equipment Currently Used at Home: none  Taken 8/1/2024 0055 by Ruy Kingston RN  Transportation Anticipated: family or friend will provide  Patient/Family Anticipated Services at Transition: none  Patient/Family Anticipates Transition to:   home   home with family     Problem: Diabetes Comorbidity  Goal: Blood Glucose Level Within Targeted Range  Outcome: Ongoing, Progressing

## 2024-08-01 NOTE — CASE MANAGEMENT/SOCIAL WORK
"Discharge Planning Assessment  [facility]     Patient Name: Erick Luong  MRN: [id number]  Today's Date: 8/1/2024    Admit Date: 7/31/2024        Discharge Needs Assessment       Row Name 08/01/24 1012       Living Environment    People in Home spouse;child(julia), dependent    Name(s) of People in Home Spouse (currently hospitalized) 15 year old son    Current Living Arrangements home    Primary Care Provided by self;spouse/significant other    Provides Primary Care For child(julia)    Caregiving Concerns 15 year old child currently at home alone    Quality of Family Relationships supportive;involved       Food Insecurity    Within the past 12 months, you worried that your food would run out before you got the money to buy more. Never true    Within the past 12 months, the food you bought just didn't last and you didn't have money to get more. Never true       Transition Planning    Patient/Family Anticipates Transition to long-term care facility;home with help/services    Patient/Family Anticipated Services at Transition home health care;skilled nursing;    Transportation Anticipated family or friend will provide       Discharge Needs Assessment    Equipment Currently Used at Home walker, rolling    Concerns to be Addressed discharge planning    Anticipated Changes Related to Illness inability to care for self;inability to care for someone else    Outpatient/Agency/Support Group Needs homecare agency;skilled nursing facility    Discharge Facility/Level of Care Needs home with home health;nursing facility, skilled    Current Discharge Risk chronically ill    Discharge Coordination/Progress Social service consult from ER staff stating, \"nursing home placement/adult protective services.\"  Patient resides at home with his 15 year old son and his spouse, who is currently hospitalized at this facility.  SW spoke with patient's spouse this am who advised 15 year old son is at home alone with other family members " checking on him.  SW inquired to spouse if she felt patient could care for himself independently and spouse stated she thought he could.  SW discussed the possibililty of SNF placement at discharge for rehab and spouse states she would need to see how patient does throughout hospitalization.  DC date currently unknown for patient and his spouse.  Given circumstances and ER consult, SW has made an APS/CPS report, intake ID# [id number].  Will be following patient throughout his staff to determine appropriate level of care upon discharge.  If patient were to need SNF placement his insurance will require prior approval and PT/OT evaluations.  SW following.                   Discharge Plan    No documentation.                 Continued Care and Services - Admitted Since 7/31/2024    No active coordination exists for this encounter.       Selected Continued Care - Episodes Includes continued care and service providers with selected services from the active episodes listed below      Rising Risk Care Management Episode start date: 8/1/2024   There are no active outsourced providers for this episode.                    Demographic Summary    No documentation.                  Functional Status    No documentation.                  Psychosocial    No documentation.                  Abuse/Neglect    No documentation.                  Legal    No documentation.                  Substance Abuse    No documentation.                  Patient Forms    No documentation.                     SUZIE Hernandez

## 2024-08-01 NOTE — PAYOR COMM NOTE
"8/1/24 [facility] [phone]  [phone]    ER ADMIT TO INPATIENT TO CCU. FAJORGEG CLINICAL FOR INPATIENT REVIEW.            Erick Luong (69 y.o. Male)       Date of Birth   1955    Social Security Number       Address   [address] [place] [address]    Home Phone   [phone]    MRN   [id number]       [facility] [place]    Marital Status                               Admission Date   7/31/24    Admission Type   Emergency    Admitting Provider   Keren Jamison MD    Attending Provider   Keren Jamison MD    Department, Room/Bed   [facility] CARDIAC CARE, C004/1       Discharge Date       Discharge Disposition       Discharge Destination                                 Attending Provider: Keren Jamison MD    Allergies: Cefepime, Bactrim [Sulfamethoxazole-trimethoprim], Vancomycin, Zolpidem, Metronidazole    Isolation: None   Infection: MRSA (05/19/19), COVID (History) (08/08/22), ESBL E coli (06/30/24)   Code Status: CPR    Ht: 182.9 cm (72\")   Wt: 130 kg (286 lb 13.1 oz)    Admission Cmt: None   Principal Problem: DKA, type 2, not at goal [E11.10]                   Active Insurance as of 7/31/2024       Primary Coverage       Payor Plan Insurance Group Employer/Plan Group    [employer] [facility] EMPLOYEE [id number]       Payor Plan Address Payor Plan Phone Number Payor Plan Fax Number Effective Dates    [address] [phone]  1/1/2022 - None Entered    [facility] [address]         Subscriber Name Subscriber Birth Date Member ID       ZAINAB LUONG 11/27/1969 [id number]               Secondary Coverage       Payor Plan Insurance Group Employer/Plan Group    MEDICARE MEDICARE A & B        Payor Plan Address Payor Plan Phone Number Payor Plan Fax Number Effective Dates    [address] [phone]  7/1/2013 - None Entered    [facility] [address]         Subscriber Name Subscriber Birth Date Member ID       ERICK LUONG " 1955 [id number]                     Emergency Contacts        (Rel.) Home Phone Work Phone Mobile Phone    Joan Luong (Spouse) [phone]             [facility] Encounter Date/Time: 2024 [facility]   Hospital Account: [id number]    MRN: [id number]   Patient:  Erick Luong   Contact Serial #: [id number]   SSN:          ENCOUNTER             Patient Class: Inpatient   Unit: [facility] [place]   Hospital Service: Intensivist     Bed: C004/1   Admitting Provider: Keren Jamison MD   Referring Physician: Abebe Garzon   Attending Provider: Keren Jamison MD   Adm Diagnosis: DKA, type 2, not at goal*               PATIENT             Name: Erick Luong : 1955 (69 yrs)   Address: [address] [place] [address] Sex: Male   City: [place] [address]   County: [facility]   Marital Status:  Ethnicity: NOT                                                                         Race: WHITE   Primary Care Provider: Del Shetty MD Patients Phone: Home Phone: [phone]     Mobile Phone: [phone]     EMERGENCY CONTACT   Contact Name Legal Guardian? Relationship to Patient Home Phone Work Phone Mobile Phone   1. Joan Luong  2. *No Contact Specified* No    Spouse    [phone]      GUARANTOR             Guarantor: Erick Luong     : 1955   Address: [address] [place] [address] Sex: [place] [address]     Relation to Patient: Self       Home Phone: [phone]   Guarantor ID: [id number]       Work Phone:     GUARANTOR EMPLOYER   Employer:           Status: DISABLED   COVERAGE          PRIMARY INSURANCE   Payor: SAUD BHATTI Plan: [employer] EM[employer]EE   Group Number: [id number] Insurance Type: INDEMNITY   Subscriber Name: JOAN LUONG Subscriber : 1969   Subscriber ID: [id number] Coverage Address: [facility] [address]  [place] [address]   Pat. Rel. to Subscriber: Spouse Coverage Phone:  [phone]   SECONDARY INSURANCE   Payor: MEDICARE Plan: MEDICARE A & B   Group Number:   Insurance Type: INDEMNITY   Subscriber Name: SUZETTE TAMAYO Subscriber : 1955   Subscriber ID: [id number] Coverage Address: [facility] [address]  [place] [id number]   Pat. Rel. to Subscriber: SELF Coverage Phone: [phone]      Contact Serial # ([id number])         2024    Chart ID ([id number])            Abebe Garzon DO   Physician  Hospitalist     ED Provider Notes     Addendum     Date of Service: 24  Creation Time: 24     Expand All Collapse All       Subjective  History of Present Illness  69-year-old male who was found wandering his garage confused.  He is able to tell me that he is at [facility] [place].  He tells me that he has chest pain.  No family is present on my initial exam.  Glucose was unable to be detected on POCT meter.     Review of Systems   Psychiatric/Behavioral:  Positive for confusion.          Medical History        Past Medical History:   Diagnosis Date    Arthritis      Autonomic disease      CAD (coronary artery disease) 2017    Cervical radiculopathy 2021    Chronic constipation with acute exaccerbation 05/10/2021    Coronary artery disease      Degeneration of cervical intervertebral disc 2021    Diabetes mellitus      Diabetic foot ulcer 2020    Diabetic polyneuropathy associated with type 2 diabetes mellitus 2021    Elevated cholesterol      Gastroesophageal reflux disease 2019    Headache      HTN (hypertension), benign 2017    Hyperlipidemia      Hypertension      Mixed hyperlipidemia 2017    Multiple lung nodules 2020     5mm, 9 mm RLL identified 2020, not present 10/2019.    Myocardial infarction      Osteomyelitis 2020    Osteomyelitis of fifth toe of right foot 10/07/2019    Pancreatitis      Persistent insomnia 2020    Renal disorder      Sleep apnea 2017  "   Sleep apnea with use of continuous positive airway pressure (CPAP)       NON-COMPLIANT    Slow transit constipation 01/16/2019    Spinal stenosis in cervical region 09/16/2021    Vitamin D deficiency 03/02/2021            Allergies         Allergies   Allergen Reactions    Cefepime Hives and Anaphylaxis    Bactrim [Sulfamethoxazole-Trimethoprim] Other (See Comments)       \"RENAL FAILURE\"    Vancomycin Itching    Zolpidem Mental Status Change       \"makes him crazy\"    Metronidazole Rash            Surgical History         Past Surgical History:   Procedure Laterality Date    ABDOMINAL SURGERY        AMPUTATION FOOT / TOE Left 10/2021     5th digit     ANTERIOR CERVICAL DISCECTOMY W/ FUSION N/A 08/05/2022     Procedure: CERVICAL DISCECTOMY ANTERIOR WITH FUSION C5-6 with possible plating of C5-7 with neuromonitoring and 1 c-arm;  Surgeon: Karel Soliz MD;  Location: [facility] OR;  Service: Neurosurgery;  Laterality: N/A;    APPENDECTOMY        BACK SURGERY        CARDIAC CATHETERIZATION Left 02/08/2021     Procedure: Left Heart Cath w poss intervention left anatomical snuff box acess;  Surgeon: Okmar Charles DO;  Location:  [facility] CATH INVASIVE LOCATION;  Service: Cardiology;  Laterality: Left;    CARDIAC SURGERY        CATARACT EXTRACTION        CERVICAL SPINE SURGERY        COLONOSCOPY N/A 01/31/2017     Normal exam repeat in 5 years    COLONOSCOPY N/A 02/11/2019     Mild acute inflammation    COLONOSCOPY N/A 04/07/2024     2 areas at 10 and 20 cm with friability ulceration 2 clips placed at 20 cm and 4 clips at 10 cm poor prep normal mucosa,mild eroisions and ulcerations in visible vessels    COLONOSCOPY N/A 7/1/2024     Procedure: COLONOSCOPY WITH ANESTHESIA;  Surgeon: Arsalan Lorenzo DO;  Location: [facility] ENDOSCOPY;  Service: Gastroenterology;  Laterality: N/A;  pre op constipation/diarrhea  post poor prep  pcp Del Shetty    COLONOSCOPY N/A 7/2/2024     Procedure: COLONOSCOPY WITH " ANESTHESIA;  Surgeon: Agapito Christopher MD;  Location: [facility] ENDOSCOPY;  Service: Gastroenterology;  Laterality: N/A;  pre rectal bleeding  post poor prep  pcp Del Shetty MD    COLONOSCOPY W/ POLYPECTOMY   2014     Hyperplastic polyp    CORONARY ARTERY BYPASS GRAFT   10/2015    ENDOSCOPY   2011     Gastritis with hemorrhage    ENDOSCOPY N/A 2017     Normal exam    ENDOSCOPY N/A 2019     Gastritis    ENDOSCOPY N/A 2020     Non-erosive gastritis with hemorrhage    ENDOSCOPY N/A 02/10/2021     Esophagitis    ENDOSCOPY N/A 2024     There were esophageal mucosal changes suspicious for short-segment Low's esophagus present in the distal esophagus. The maximum longitudinal extent of these mucosal changes was 2 cm in length. Mucosa was biopsied with a cold forceps for histologyDistal esophagus, biopsies: Mild chronic active esophagogastritis. No evidence of intestinal metaplasia, dysplasia. Antrum, bx, Mild chronic gastritis    FOOT SURGERY Left      INCISION AND DRAINAGE OF WOUND Left 2007     spider bite                  Family History   Problem Relation Age of Onset    Colon cancer Father      Heart disease Father      Colon cancer Sister      Colon polyps Sister      Alzheimer's disease Mother      Coronary artery disease Sister      Coronary artery disease Sister           Social History   Social History            Socioeconomic History    Marital status:    Tobacco Use    Smoking status: Former       Current packs/day: 0.00       Types: Cigarettes       Quit date:        Years since quittin.6    Smokeless tobacco: Never    Tobacco comments:       smoked in highschool   Vaping Use    Vaping status: Never Used   Substance and Sexual Activity    Alcohol use: No    Drug use: No    Sexual activity: Defer                        Objective[]Expand by Default  Physical Exam  Vitals reviewed.   Constitutional:       Appearance: He is obese. He is ill-appearing.       Comments: The patient smells of urine.   HENT:      Head: Normocephalic and atraumatic.      Nose: Nose normal.   Eyes:      General: No scleral icterus.     Conjunctiva/sclera: Conjunctivae normal.      Pupils: Pupils are equal.   Cardiovascular:      Rate and Rhythm: Normal rate and regular rhythm.      Heart sounds: Normal heart sounds.   Pulmonary:      Effort: Pulmonary effort is normal.      Breath sounds: Normal breath sounds.   Abdominal:      General: Bowel sounds are normal.      Palpations: Abdomen is soft.   Musculoskeletal:         General: No tenderness.      Cervical back: Normal range of motion and neck supple.   Skin:     General: Skin is warm and dry.      Comments: The patient has red legs.  He has excoriations on his pretibial surfaces.  He has a right knee abrasion.  His toes are red and edematous.  It appears that he has a healed wound on the left heel, but it is caked with hair and dirt.  His groin is excoriated.   Neurological:      Mental Status: He is confused.      Cranial Nerves: No cranial nerve deficit, dysarthria or facial asymmetry.   Psychiatric:      Comments: He is able to tell me that he is at [facility].            Procedures           Lab Results (last 24 hours)         Procedure Component Value Units Date/Time     CBC & Differential [[id number]]  (Abnormal) Collected: 07/31/24 1849     Specimen: Blood Updated: 07/31/24 1906     Narrative:       The following orders were created for panel order CBC & Differential.  Procedure                               Abnormality         Status                     ---------                               -----------         ------                     CBC Auto Differential[[id number]]        Abnormal            Final result                  Please view results for these tests on the individual orders.     Comprehensive Metabolic Panel [[id number]]  (Abnormal) Collected: 07/31/24 1849     Specimen: Blood Updated: 07/31/24 1944       Glucose 704  mg/dL         BUN 55 mg/dL         Creatinine 2.67 mg/dL         Sodium 132 mmol/L         Potassium 4.5 mmol/L         Chloride 97 mmol/L         CO2 19.0 mmol/L         Calcium 9.0 mg/dL         Total Protein 6.7 g/dL         Albumin 3.5 g/dL         ALT (SGPT) 8 U/L         AST (SGOT) 11 U/L         Alkaline Phosphatase 101 U/L         Total Bilirubin 0.6 mg/dL         Globulin 3.2 gm/dL         A/G Ratio 1.1 g/dL         BUN/Creatinine Ratio 20.6       Anion Gap 16.0 mmol/L         eGFR 25.2 mL/min/1.73       Narrative:       GFR Normal >60  Chronic Kidney Disease <60  Kidney Failure <15        High Sensitivity Troponin T [[id number]]  (Abnormal) Collected: 07/31/24 1849     Specimen: Blood Updated: 07/31/24 1929       HS Troponin T 342 ng/L       Narrative:       High Sensitive Troponin T Reference Range:  <14.0 ng/L- Negative Female for AMI  <22.0 ng/L- Negative Male for AMI  >=14 - Abnormal Female indicating possible myocardial injury.  >=22 - Abnormal Male indicating possible myocardial injury.   Clinicians would have to utilize clinical acumen, EKG, Troponin, and serial changes to determine if it is an Acute Myocardial Infarction or myocardial injury due to an underlying chronic condition.           Acetone [[id number]]  (Abnormal) Collected: 07/31/24 1849     Specimen: Blood Updated: 07/31/24 1910       Acetone Small     CBC Auto Differential [[id number]]  (Abnormal) Collected: 07/31/24 1849     Specimen: Blood Updated: 07/31/24 1906       WBC 15.48 10*3/mm3         RBC 3.55 10*6/mm3         Hemoglobin 9.8 g/dL         Hematocrit 29.6 %         MCV 83.4 fL         MCH 27.6 pg         MCHC 33.1 g/dL         RDW 14.5 %         RDW-SD 43.8 fl         MPV 13.1 fL         Platelets 201 10*3/mm3         Neutrophil % 88.7 %         Lymphocyte % 2.9 %         Monocyte % 7.1 %         Eosinophil % 0.1 %         Basophil % 0.3 %         Immature Grans % 0.9 %         Neutrophils, Absolute 13.73 10*3/mm3          Lymphocytes, Absolute 0.45 10*3/mm3         Monocytes, Absolute 1.10 10*3/mm3         Eosinophils, Absolute 0.01 10*3/mm3         Basophils, Absolute 0.05 10*3/mm3         Immature Grans, Absolute 0.14 10*3/mm3       Blood Culture - Blood, Hand, Left [[id number]] Collected: 07/31/24 1849     Specimen: Blood from Hand, Left Updated: 07/31/24 1902     Blood Culture - Blood, Arm, Left [[id number]] Collected: 07/31/24 1849     Specimen: Blood from Arm, Left Updated: 07/31/24 1902     Sedimentation Rate [[id number]]  (Abnormal) Collected: 07/31/24 1849     Specimen: Blood Updated: 07/31/24 1910       Sed Rate 57 mm/hr       C-reactive Protein [[id number]]  (Abnormal) Collected: 07/31/24 1849     Specimen: Blood Updated: 07/31/24 1932       C-Reactive Protein 17.04 mg/dL       Lactic Acid, Plasma [[id number]]  (Abnormal) Collected: 07/31/24 1849     Specimen: Blood Updated: 07/31/24 1928       Lactate 2.4 mmol/L       Phosphorus [[id number]]  (Normal) Collected: 07/31/24 1849     Specimen: Blood Updated: 07/31/24 2110       Phosphorus 2.7 mg/dL       Magnesium [[id number]]  (Normal) Collected: 07/31/24 1849     Specimen: Blood Updated: 07/31/24 2110       Magnesium 2.2 mg/dL       Hemoglobin A1c [[id number]]  (Abnormal) Collected: 07/31/24 1849     Specimen: Blood Updated: 07/31/24 2004       Hemoglobin A1C 12.60 %       Narrative:       Hemoglobin A1C Ranges:     Increased Risk for Diabetes  5.7% to 6.4%  Diabetes                     >= 6.5%  Diabetic Goal                < 7.0%     Urinalysis With Culture If Indicated - Straight Cath [[id number]]  (Abnormal) Collected: 07/31/24 1851     Specimen: Urine from Straight Cath Updated: 07/31/24 1924       Color, UA Yellow       Appearance, UA Clear       pH, UA <=5.0       Specific Gravity, UA 1.028       Glucose, UA >=1000 mg/dL (3+)       Ketones, UA Trace       Bilirubin, UA Negative       Blood, UA Trace       Protein,  mg/dL (2+)       Leuk Esterase, UA Negative        Nitrite, UA Positive       Urobilinogen, UA 0.2 E.U./dL     Narrative:       In absence of clinical symptoms, the presence of pyuria, bacteria, and/or nitrites on the urinalysis result does not correlate with infection.     Urinalysis, Microscopic Only - Straight Cath [[id number]]  (Abnormal) Collected: 07/31/24 1851     Specimen: Urine from Straight Cath Updated: 07/31/24 1924       RBC, UA 3-5 /HPF         WBC, UA 6-10 /HPF         Bacteria, UA 2+ /HPF         Squamous Epithelial Cells, UA 0-2 /HPF         Hyaline Casts, UA None Seen /LPF         Methodology Manual Light Microscopy     Urine Culture - Urine, Straight Cath [[id number]] Collected: 07/31/24 1851     Specimen: Urine from Straight Cath Updated: 07/31/24 1924     Blood Gas, Venous - [[id number]]  (Abnormal) Collected: 07/31/24 2021     Specimen: Venous Blood Updated: 07/31/24 2021       Site OTHER       pH, Venous 7.410 pH Units         pCO2, Venous 32.2 mm Hg         Comment: 84 Value below reference range          pO2, Venous 55.6 mm Hg         Comment: 83 Value above reference range          HCO3, Venous 20.4 mmol/L         Comment: 84 Value below reference range          Base Excess, Venous -3.6 mmol/L         Comment: 84 Value below reference range          O2 Saturation, Venous 89.9 %         Comment: 83 Value above reference range          Temperature 37.0       Barometric Pressure for Blood Gas 751 mmHg         Modality Room Air       FIO2 21 %         Ventilator Mode NA       Collected by [id number]       Comment: Meter: [id number]     :  [email or web address]        POC Glucose Once [[id number]]  (Abnormal) Collected: 07/31/24 2021     Specimen: Blood Updated: 07/31/24 2033       Glucose 575 mg/dL         Comment: : [id number] [facility] ID: [id number]        High Sensitivity Troponin T 2Hr [[id number]]  (Abnormal) Collected: 07/31/24 2030     Specimen: Blood Updated: 07/31/24 2058       HS Troponin T 353 ng/L         Troponin T Delta 11  ng/L       Narrative:       High Sensitive Troponin T Reference Range:  <14.0 ng/L- Negative Female for AMI  <22.0 ng/L- Negative Male for AMI  >=14 - Abnormal Female indicating possible myocardial injury.  >=22 - Abnormal Male indicating possible myocardial injury.   Clinicians would have to utilize clinical acumen, EKG, Troponin, and serial changes to determine if it is an Acute Myocardial Infarction or myocardial injury due to an underlying chronic condition.           Osmolality, Serum [[id number]]  (Abnormal) Collected: 07/31/24 2030     Specimen: Blood Updated: 07/31/24 2103       Osmolality 329 mOsm/kg       Basic Metabolic Panel [[id number]]  (Abnormal) Collected: 07/31/24 2030     Specimen: Blood Updated: 07/31/24 2111       Glucose 653 mg/dL         BUN 54 mg/dL         Creatinine 2.64 mg/dL         Sodium 133 mmol/L         Potassium 4.4 mmol/L         Chloride 98 mmol/L         CO2 20.0 mmol/L         Calcium 8.9 mg/dL         BUN/Creatinine Ratio 20.5       Anion Gap 15.0 mmol/L         eGFR 25.6 mL/min/1.73       Narrative:       GFR Normal >60  Chronic Kidney Disease <60  Kidney Failure <15        Magnesium [[id number]]  (Normal) Collected: 07/31/24 2030     Specimen: Blood Updated: 07/31/24 2056       Magnesium 2.3 mg/dL       Phosphorus [[id number]]  (Normal) Collected: 07/31/24 2030     Specimen: Blood Updated: 07/31/24 2054       Phosphorus 3.1 mg/dL               XR Chest 1 View   Final Result   1. Hypoventilation with vascular crowding.   2. Mild bronchial wall thickening, stable.               This report was signed and finalized on 7/31/2024 7:41 PM by Dr. Raz Mendes MD.           XR Foot 3+ View Left   Final Result   1. Deep ulcer on the sole of the foot posteriorly near the calcaneus.   There is a questionable small area of bony erosion along the plantar   surface of the calcaneus just proximal to the plantar calcaneal spur.   Osteomyelitis cannot be ruled out. The soft tissue ulcer is  packed with   some type of radiopaque material.   2. Linear foreign bodies projected over the soft tissues of the second   toe and first toe. Artifacts are also included in the differential.   3. Other chronic changes, as discussed.           This report was signed and finalized on 7/31/2024 7:47 PM by Dr. Raz Mendes MD.                    ED Course      ED Course as of 07/31/24 2120 Wed Jul 31, 2024 2117 Spoke with the ICU NP. He is agreeable to admission.  [AJ]       ED Course User Index  [AJ] Abebe Garzon,                                            Medical Decision Making  Differential diagnosis includes: Sepsis, UTI, dehydration, osteomyelitis of the foot, DKA     Problems Addressed:  Atrial fibrillation with RVR: complicated acute illness or injury  Confusion: complicated acute illness or injury  Diabetic ketoacidosis without coma associated with type 2 diabetes mellitus: complicated acute illness or injury  Elevated troponin: complicated acute illness or injury  Osteomyelitis of foot, unspecified laterality, unspecified type: complicated acute illness or injury     Amount and/or Complexity of Data Reviewed  Labs: ordered.     Details: CBC CMP lactic  Radiology: ordered.     Details: CT head chest x-ray left foot x-ray  ECG/medicine tests: ordered.     Risk  OTC drugs.  Prescription drug management.  Decision regarding hospitalization.           Final diagnoses:   Osteomyelitis of foot, unspecified laterality, unspecified type   Diabetic ketoacidosis without coma associated with type 2 diabetes mellitus   Elevated troponin   Atrial fibrillation with RVR   Confusion         ED Disposition  ED Disposition         ED Disposition   Decision to Admit    Condition   --    Comment   Level of Care: Critical Care [6]   Diagnosis: DKA, type 2, not at goal [546369]   Admitting Physician: SUDEEP GONZALEZ [[id number]]   Certification: I Certify That Inpatient Hospital Services Are Medically Necessary For  Greater Than 2 Midnights                      No follow-up provider specified.         Medication List       No changes were made to your prescriptions during this visit.               Abebe Garzon, DO  07/31/24 1850        Abebe Garzonell, DO  07/31/24 2120           Lamine Figueroa APRN   Nurse Practitioner  Intensivist     H&P      Cosign Needed     Date of Service: 07/31/24 2230  Creation Time: 07/31/24 2242     Cosign Needed       Expand All Collapse [facility] Intensivist Services     Date of Admission: 7/31/2024  Date of Note: 07/31/24  Primary Care Physician: Del Shetty MD     History   Mr. Luong is a 69-year-old male who presented to [facility] ER via EMS for altered mental status.  It was reported to ER physician by EMS that the patient was was found by his son confused and wandering around in the garage.  Unfortunately family was not available for additional information.   HPI obtained from ER physician, Dr. Dee, who advises that patient presented soiled, confused and unable to provided events leading to his reason for EMS transport.      ER course workup significant for: CBC with elevated WBC of 15.4, low hemoglobin 9.8 and hematocrit 29.6, elevated ESR 57, elevated CRP of 17.  CMP with low sodium 133, elevated BUN of 54 and elevated creatinine 2.64, elevated glucose 704, urine ketones and small amount of acetone. Elevated lactate 2.4 with delta 2.2.  Elevated HS troponin T 342 delta 353.   Urinalysis: Trace blood, positive nitrite, negative leukocytes, +2 bacteria.   CXR: Low lung volumes and vascular crowding.  X-ray of left foot: Ulcer on the sole of the foot posteriorly near the calcaneus.  There is questionable small area of bony erosion along the plantar surface of the calcaneus just proximal to the plantar calcaneal spur.  Osteomyelitis cannot be ruled out.   EKG: A-fib with rapid ventricular response with rate of 128 bpm.      The ICU team  was asked to evaluate the patient for AMS, AFIB-RVR, DKA, and open wound to left foot concerning for possible osteomyelitis.      I have seen and evaluated patient upon his arrival to CCU 4 where he appears in no acute distress, breathing is equal and non-labored.  He is slow to provided answers and is intermittently confused. He is able to tell me his name, , and the year after many tries, he is unaware to reason for hospitalization or how he got here.   He currently has insulin, and Cardizem drip infusing. Reports generalized discomfort, worse throughout the abdomen with associated nausea and reports vomiting early today and being sick the last couple days, does not give further details. Abdomen is distended and soft, grossly tender. Denies diarrhea or bloody/dark stool. Reports chest discomfort and shortness of breath. Open stage 2-3 decubitus ulcer to the left heel with associated soft tissue swelling, redness and weeping.      Past Medical History      Active and Resolved Problems        Active Hospital Problems     Diagnosis   POA    **DKA, type 2, not at goal [E11.10]   Yes       Resolved Hospital Problems   No resolved problems to display.         Past Medical History:   Medical History        Past Medical History:   Diagnosis Date    Arthritis      Autonomic disease      CAD (coronary artery disease) 2017    Cervical radiculopathy 2021    Chronic constipation with acute exaccerbation 05/10/2021    Coronary artery disease      Degeneration of cervical intervertebral disc 2021    Diabetes mellitus      Diabetic foot ulcer 2020    Diabetic polyneuropathy associated with type 2 diabetes mellitus 2021    Elevated cholesterol      Gastroesophageal reflux disease 2019    Headache      HTN (hypertension), benign 2017    Hyperlipidemia      Hypertension      Mixed hyperlipidemia 2017    Multiple lung nodules 2020     5mm, 9 mm RLL identified 2020, not  present 10/2019.    Myocardial infarction      Osteomyelitis 01/22/2020    Osteomyelitis of fifth toe of right foot 10/07/2019    Pancreatitis      Persistent insomnia 01/20/2020    Renal disorder      Sleep apnea 02/06/2017    Sleep apnea with use of continuous positive airway pressure (CPAP)       NON-COMPLIANT    Slow transit constipation 01/16/2019    Spinal stenosis in cervical region 09/16/2021    Vitamin D deficiency 03/02/2021            Prior Surgeries: He  has a past surgical history that includes Appendectomy; Abdominal surgery; Cataract extraction; Cardiac surgery; Incision and drainage of wound (Left, 09/2007); Colonoscopy w/ polypectomy (03/04/2014); Esophagogastroduodenoscopy (04/13/2011); Cervical spine surgery; Colonoscopy (N/A, 01/31/2017); Esophagogastroduodenoscopy (N/A, 05/05/2017); Coronary artery bypass graft (10/2015); Back surgery; Esophagogastroduodenoscopy (N/A, 02/11/2019); Colonoscopy (N/A, 02/11/2019); Esophagogastroduodenoscopy (N/A, 09/01/2020); Esophagogastroduodenoscopy (N/A, 02/10/2021); Cardiac catheterization (Left, 02/08/2021); Amputation foot / toe (Left, 10/2021); Foot surgery (Left); Anterior cervical discectomy w/ fusion (N/A, 08/05/2022); Colonoscopy (N/A, 04/07/2024); Esophagogastroduodenoscopy (N/A, 04/11/2024); Colonoscopy (N/A, 7/1/2024); and Colonoscopy (N/A, 7/2/2024).     Past Surgical History:   Surgical History         Past Surgical History:   Procedure Laterality Date    ABDOMINAL SURGERY        AMPUTATION FOOT / TOE Left 10/2021     5th digit     ANTERIOR CERVICAL DISCECTOMY W/ FUSION N/A 08/05/2022     Procedure: CERVICAL DISCECTOMY ANTERIOR WITH FUSION C5-6 with possible plating of C5-7 with neuromonitoring and 1 c-arm;  Surgeon: Karel Soliz MD;  Location: [facility];  Service: Neurosurgery;  Laterality: N/A;    APPENDECTOMY        BACK SURGERY        CARDIAC CATHETERIZATION Left 02/08/2021     Procedure: Left Heart Cath w poss intervention left anatomical  snuff box aceflora;  Surgeon: Omkar Charles DO;  Location: [facility] CATH INVASIVE LOCATION;  Service: Cardiology;  Laterality: Left;    CARDIAC SURGERY        CATARACT EXTRACTION        CERVICAL SPINE SURGERY        COLONOSCOPY N/A 01/31/2017     Normal exam repeat in 5 years    COLONOSCOPY N/A 02/11/2019     Mild acute inflammation    COLONOSCOPY N/A 04/07/2024     2 areas at 10 and 20 cm with friability ulceration 2 clips placed at 20 cm and 4 clips at 10 cm poor prep normal mucosa,mild eroisions and ulcerations in visible vessels    COLONOSCOPY N/A 7/1/2024     Procedure: COLONOSCOPY WITH ANESTHESIA;  Surgeon: Arsalan Lorenzo DO;  Location: [facility] ENDOSCOPY;  Service: Gastroenterology;  Laterality: N/A;  pre op constipation/diarrhea  post poor prep  pcp Del Shetty    COLONOSCOPY N/A 7/2/2024     Procedure: COLONOSCOPY WITH ANESTHESIA;  Surgeon: Agapito Christopher MD;  Location: [facility] ENDOSCOPY;  Service: Gastroenterology;  Laterality: N/A;  pre rectal bleeding  post poor prep  pcp Del Shetty MD    COLONOSCOPY W/ POLYPECTOMY   03/04/2014     Hyperplastic polyp    CORONARY ARTERY BYPASS GRAFT   10/2015    ENDOSCOPY   04/13/2011     Gastritis with hemorrhage    ENDOSCOPY N/A 05/05/2017     Normal exam    ENDOSCOPY N/A 02/11/2019     Gastritis    ENDOSCOPY N/A 09/01/2020     Non-erosive gastritis with hemorrhage    ENDOSCOPY N/A 02/10/2021     Esophagitis    ENDOSCOPY N/A 04/11/2024     There were esophageal mucosal changes suspicious for short-segment Low's esophagus present in the distal esophagus. The maximum longitudinal extent of these mucosal changes was 2 cm in length. Mucosa was biopsied with a cold forceps for histologyDistal esophagus, biopsies: Mild chronic active esophagogastritis. No evidence of intestinal metaplasia, dysplasia. Antrum, bx, Mild chronic gastritis    FOOT SURGERY Left      INCISION AND DRAINAGE OF WOUND Left 09/2007     spider bite            Social and Family  "History      Family History:  family history includes Alzheimer's disease in his mother; Colon cancer in his father and sister; Colon polyps in his sister; Coronary artery disease in his sister and sister; Heart disease in his father.     Tobacco/Social History:  reports that he quit smoking about 33 years ago. His smoking use included cigarettes. He has never used smokeless tobacco. He reports that he does not drink alcohol and does not use drugs.     Allergies      Allergies:   He is allergic to cefepime, bactrim [sulfamethoxazole-trimethoprim], vancomycin, zolpidem, and metronidazole.     Allergies         Allergies   Allergen Reactions    Cefepime Hives and Anaphylaxis    Bactrim [Sulfamethoxazole-Trimethoprim] Other (See Comments)       \"RENAL FAILURE\"    Vancomycin Itching    Zolpidem Mental Status Change       \"makes him crazy\"    Metronidazole Rash            Labs      Basic Labs:  Common Labs:   Common labs            7/3/2024    08:12 7/25/2024    10:18 7/31/2024    18:49 7/31/2024    20:30   Common Labs   Glucose 115  59  704  653    BUN 22  43  55  54    Creatinine 1.54  2.24  2.67  2.64    Sodium 143  144  132  133    Potassium 3.5  3.4  4.5  4.4    Chloride 104  106  97  98    Calcium 8.8  9.2  9.0  8.9    Albumin   3.8  3.5      Total Bilirubin   0.3  0.6      Alkaline Phosphatase   84  101      AST (SGOT)   11  11      ALT (SGPT)   7  8      WBC   8.92  15.48      Hemoglobin   11.2  9.8      Hematocrit   34.9  29.6      Platelets   223  201      Total Cholesterol   168        Triglycerides   69        HDL Cholesterol   57        LDL Cholesterol    98        Hemoglobin A1C   11.60  12.60      Microalbumin, Urine   73.7        PSA   0.403                 Diabetic:  Last 3 A1C Results:   A1C Last 3 Results            5/3/2024    16:22 7/25/2024    10:18 7/31/2024    18:49   HGBA1C Last 3 Results   Hemoglobin A1C 11.20  11.60  12.60             Inpatient Medications      Medications: Scheduled " Meds:  Scheduled Medication   enoxaparin, 1 mg/kg, Subcutaneous, Q12H  sodium chloride, 10 mL, Intravenous, Q12H  vancomycin, 2,500 mg, Intravenous, Once   Followed by  [START ON 8/1/2024] vancomycin, 750 mg, Intravenous, Q24H         Continuous Infusions:  Infusion Medications[]Expand by Default   dextrose 5 % and sodium chloride 0.45 %, 150 mL/hr  dextrose 5 % and sodium chloride 0.45 % with KCl 20 mEq/L, 150 mL/hr  dextrose 5 % and sodium chloride 0.45 % with KCl 40 mEq/L, 150 mL/hr  dextrose 5 % and sodium chloride 0.9 %, 150 mL/hr  dextrose 5 % and sodium chloride 0.9 % with KCl 20 mEq, 150 mL/hr  dextrose 5% and sodium chloride 0.9% with KCl 40 mEq/L, 150 mL/hr  dilTIAZem, 5-15 mg/hr, Last Rate: 5 mg/hr (07/31/24 2031)  insulin, 0-100 Units/hr, Last Rate: 3.9 Units/hr (07/31/24 2239)  Pharmacy to Dose enoxaparin (LOVENOX),   sodium chloride 0.45 % 1,000 mL with potassium chloride 40 mEq infusion, 250 mL/hr  sodium chloride, 250 mL/hr  sodium chloride 0.45 % with KCl 20 mEq, 250 mL/hr  sodium chloride, 250 mL/hr  sodium chloride 0.9 % with KCl 20 mEq, 250 mL/hr  sodium chloride 0.9 % with KCl 40 mEq/L, 250 mL/hr         PRN Meds:.  PRN Medication     Calcium Replacement - Follow Nurse / BPA Driven Protocol    dextrose    dextrose    dextrose 5 % and sodium chloride 0.45 %    dextrose 5 % and sodium chloride 0.45 % with KCl 20 mEq/L    dextrose 5 % and sodium chloride 0.45 % with KCl 40 mEq/L    dextrose 5 % and sodium chloride 0.9 %    dextrose 5 % and sodium chloride 0.9 % with KCl 20 mEq    dextrose 5% and sodium chloride 0.9% with KCl 40 mEq/L    glucagon (human recombinant)    Magnesium Standard Dose Replacement - Follow Nurse / BPA Driven Protocol    Pharmacy to Dose enoxaparin (LOVENOX)    Phosphorus Replacement - Follow Nurse / BPA Driven Protocol    Potassium Replacement - Follow Nurse / BPA Driven Protocol    sodium chloride 0.45 % 1,000 mL with potassium chloride 40 mEq infusion    sodium chloride     "sodium chloride 0.45 % with KCl 20 mEq    sodium chloride    sodium chloride    sodium chloride    sodium chloride 0.9 % with KCl 20 mEq    sodium chloride 0.9 % with KCl 40 mEq/L        I have reviewed the patient's current medications.   Outpatient Medications      Outpatient Medications:   Medications Taking   No outpatient medications have been marked as taking for the 7/31/24 encounter (Hospital Encounter).            Current Antibiotics      vancomycin  vancomycin (VANCOCIN) IVPB in 500 mL (1750 mg or greater)     Exam      Vitals: His  height is 182.9 cm (72\") and weight is 132 kg (290 lb). His oral temperature is 99.5 °F (37.5 °C). His blood pressure is 100/53 and his pulse is 82. His respiration is 21 and oxygen saturation is 97%.      GENERAL: Confused, no acute distress.   SKIN:  Warm, dry  EYES:  Pupils equal, round and reactive to light.  EOMs intact.    HEAD:  Normocephalic.  NECK:  Supple   RESP:  Lungs clear to auscultation. Good airflow. Normal respiratory effort.   CARDIAC:  AFIB rate controlled 80 bpm, on cardizem drip currently. Normal S1,S2. No edema  GI: protuberant, distended, soft, tender throughout.  MSK:  Normal muscle bulk, tone  NEUROLOGICAL:  No focal neurological deficits. Follows commands  PSYCHIATRIC:  Normal affect and mood.  Alert to person and place, disoriented to time or reason for hospitalization or how he arrived to facility.      Results and Cultures Review      Result Review:  I have personally reviewed the results from the time of this admission to 7/31/2024 22:46 CDT and agree with these findings:  [x]  Laboratory list / accordion  [x]  Microbiology  []  Radiology  [x]  EKG/Telemetry   [x]  Cardiology/Vascular   []  Pathology  [x]  Old records  []  Other:  Most notable findings include:  see HPI        Culture Data:   No results found for: \"BLOODCX\", \"URINECX\", \"WOUNDCX\", \"MRSACX\", \"RESPCX\", \"STOOLCX\"     Assessment/Plan   This is a 69-year-old male who presented to [facility] ER " via EMS for altered mental status.  PMHx DM type II, atrial fibrillation, CAD, chronic poor healing diabetic left foot ulcer with osteomyelitis, diabetic polyneuropathy, hypertension, hyperlipidemia insomnia, CKD stage IV, BRITTNI-CPAP, cervical spine stenosis, and vitamin D deficiency with numerous hospitalizations this year. PT was found to be confused in  AFIB-RVR, DKA, and open wound to left heal concerning acute infection with osteomyelitis. Critical care team asked to further manage acute illness.      Patient will be admitted under intensivist MD, Dr. Jamison, case will be discussed in the AM.  Further recommendations and/or modifications to the treatment plan are ultimately at his discretion.     Treatment Plan     AFIB-RVR  -chronic history of atrial fibrillation. On review of PMHx records he was prescribed and taking Eliquis, which has been held due to GI bleeding after which he was suppose to f/u with cardiology about restarting. Epic shows cancelled f/u visits with cardiology, medication is currently not on patients med list.  -Lovenox AFIB coverage ordered in ED.  -Cardizem drip started in ED, rate controlled currently 80-90 bpm.   -likely rapid ventricular response 2/2 acute illness and probable underlying infection.  -Plan to DC Cardizem and transition back to his home dose BB  -elevated troponin in  delta 353, likely type II MI, cardiology consulted in ER, we appreciate their continued input.     ?Diabetic ketoacidosis  -History DM type 2 insulin-dependent  -small amount of acetone, ketones in urine  -do not have beta hydroxybutyrate at this facility  -BS at arrival 704  -normal anion gap  -normal pH 7.41  -Insulin drip started in ER will discontinue and transition to SSI  -likely HHS in the setting of acute illness     Urinary Tract Infection  -urinalysis shows nitrate positive, and +2 bacteria  -Hx of ESBL 6/30/24  -Urine culture pending  -Started on Zosyn/Vanco in the ER will continue  -ID  consult     Diabetic foot ulcer  -chronic open wound, DC ulcer stage 2-3 to left heal with larger area soft pale tissue with surrounding edema/erythema and weeping at the outer aspect of the foot along the area of the 5th metatarsal.   -Hx of osteomyelitis and related MRSA bacteremia   -Zosyn/Vanco started in ED will continue   -podiatry consult  -wound care consult     Altered mental status  -likely related to acute illness/metabolic encephalopathy.  -cannot r/u stroke. No CT head in ER. No focal neuro deficits  -CT head ordered-results pending      Abdominal pain  -reported nausea/vomiting. Abdominal distention and tender on exam  -Hx of pancreatitis  -previous appendectomy  -Hx of GI bleeding, no active bleeding noted  -No CT abd/pel in ER  -CT abd/pel with contrast ordered-results pending     CKD-IV  -bun/creatinine at baseline  -monitor lytes and urine output   -avoid neph toxic medications        VTE Prophylaxis:     Pharmacologic VTE prophylaxis orders are present.           Total critical care time: Approximately 65 minutes     Due to a high probability of clinically significant, life threatening deterioration, the patient required my highest level of preparedness to intervene emergently and I personally spent this critical care time directly and personally managing the patient.      This critical care time included obtaining a history; examining the patient; pulse oximetry; ordering and review of studies; arranging urgent treatment with development of a management plan; evaluation of patient's response to treatment; frequent reassessment; and, discussions with other providers.     This critical care time was performed to assess and manage the high probability of imminent, life-threatening deterioration that could result in multi-organ failure. It was exclusive of separately billable procedures and treating other patients and teaching time.     Please see MDM section and the rest of the note for further  information on patient assessment and treatment.     Part of this note may be an electronic transcription/translation of spoken language to printed text using the Dragon Dictation System.     Electronically signed by SUSAN Richter on 7/31/2024 at 22:46 CDT                     mEeka Garay MD   Physician  Cardiology     Consults     Signed     Date of Service: 08/01/24 0831  Creation Time: 08/01/24 0831  Consult Orders   Inpatient Cardiology Consult [[id number]] ordered by Lamine Figueroa APRN          Signed       Expand All Collapse All    Inpatient Cardiology Consult  Consult performed by: Emeka Garay MD  Consult ordered by: Lamine Figueroa APRN  Reason for consult: A-fib RVR, elevated troponin               Chief Complaint   Patient presents with    Altered Mental Status      HPI: This is a 69-year-old male who is well-known to me as we follow him for coronary artery disease as well as atrial fibrillation in the outpatient setting.  Sadly, the patient has been admitted multiple times to the hospital in recent years for a host of complaints.  He presented this time with altered mental status and was found to be in what appears to be diabetic ketoacidosis.  The patient this morning says he is feeling very poorly and has some lower abdominal pain but denies having chest discomfort or palpitations.  Upon arrival, his heart rate was elevated he tells me that he was not experiencing palpitations although he was also noted to be altered upon arrival.  In any event, he notes no palpitations or chest discomfort at this time.  He notes breathing being relatively stable although historically, this patient has endorsed mild shortness of breath and dyspnea with exertion.  He has some degree of chronic lower extremity edema which he tells me is essentially the same as usual.  He notes that he has been compliant with his medications but his blood glucose is out of control, at least upon arrival  and his hemoglobin A1c shows poor control of blood glucose.  In any event, cardiology has been asked to see the patient regarding the atrial fibrillation with elevated heart rate on arrival and the elevated troponin that was found in his workup.     Overnight, he has been admitted to the intensive care unit/CCU and his blood sugars have improved.  He is still feeling very poorly this morning but other than some lower quadrant abdominal pain, he endorses no other symptoms, particularly of the cardiac nature at this particular time.     Medical History        Past Medical History:   Diagnosis Date    Arthritis      Autonomic disease      CAD (coronary artery disease) 02/06/2017    Cervical radiculopathy 09/16/2021    Chronic constipation with acute exaccerbation 05/10/2021    Coronary artery disease      Degeneration of cervical intervertebral disc 08/11/2021    Diabetes mellitus      Diabetic foot ulcer 08/31/2020    Diabetic polyneuropathy associated with type 2 diabetes mellitus 01/18/2021    Elevated cholesterol      Gastroesophageal reflux disease 05/13/2019    Headache      HTN (hypertension), benign 05/03/2017    Hyperlipidemia      Hypertension      Mixed hyperlipidemia 02/07/2017    Multiple lung nodules 01/26/2020     5mm, 9 mm RLL identified 1/2020, not present 10/2019.    Myocardial infarction      Osteomyelitis 01/22/2020    Osteomyelitis of fifth toe of right foot 10/07/2019    Pancreatitis      Persistent insomnia 01/20/2020    Renal disorder      Sleep apnea 02/06/2017    Sleep apnea with use of continuous positive airway pressure (CPAP)       NON-COMPLIANT    Slow transit constipation 01/16/2019    Spinal stenosis in cervical region 09/16/2021    Vitamin D deficiency 03/02/2021         Surgical History         Past Surgical History:   Procedure Laterality Date    ABDOMINAL SURGERY        AMPUTATION FOOT / TOE Left 10/2021     5th digit     ANTERIOR CERVICAL DISCECTOMY W/ FUSION N/A 08/05/2022      Procedure: CERVICAL DISCECTOMY ANTERIOR WITH FUSION C5-6 with possible plating of C5-7 with neuromonitoring and 1 c-arm;  Surgeon: Karel Soliz MD;  Location: [facility] OR;  Service: Neurosurgery;  Laterality: N/A;    APPENDECTOMY        BACK SURGERY        CARDIAC CATHETERIZATION Left 02/08/2021     Procedure: Left Heart Cath w poss intervention left anatomical snuff box acess;  Surgeon: Omkar Charles DO;  Location: [facility] CATH INVASIVE LOCATION;  Service: Cardiology;  Laterality: Left;    CARDIAC SURGERY        CATARACT EXTRACTION        CERVICAL SPINE SURGERY        COLONOSCOPY N/A 01/31/2017     Normal exam repeat in 5 years    COLONOSCOPY N/A 02/11/2019     Mild acute inflammation    COLONOSCOPY N/A 04/07/2024     2 areas at 10 and 20 cm with friability ulceration 2 clips placed at 20 cm and 4 clips at 10 cm poor prep normal mucosa,mild eroisions and ulcerations in visible vessels    COLONOSCOPY N/A 7/1/2024     Procedure: COLONOSCOPY WITH ANESTHESIA;  Surgeon: Arsalan Lorenzo DO;  Location: [facility] ENDOSCOPY;  Service: Gastroenterology;  Laterality: N/A;  pre op constipation/diarrhea  post poor prep  pcp Del Shetty    COLONOSCOPY N/A 7/2/2024     Procedure: COLONOSCOPY WITH ANESTHESIA;  Surgeon: Agapito Christopher MD;  Location: [facility] ENDOSCOPY;  Service: Gastroenterology;  Laterality: N/A;  pre rectal bleeding  post poor prep  pcp Del Shetty MD    COLONOSCOPY W/ POLYPECTOMY   03/04/2014     Hyperplastic polyp    CORONARY ARTERY BYPASS GRAFT   10/2015    ENDOSCOPY   04/13/2011     Gastritis with hemorrhage    ENDOSCOPY N/A 05/05/2017     Normal exam    ENDOSCOPY N/A 02/11/2019     Gastritis    ENDOSCOPY N/A 09/01/2020     Non-erosive gastritis with hemorrhage    ENDOSCOPY N/A 02/10/2021     Esophagitis    ENDOSCOPY N/A 04/11/2024     There were esophageal mucosal changes suspicious for short-segment Low's esophagus present in the distal esophagus. The maximum longitudinal extent  of these mucosal changes was 2 cm in length. Mucosa was biopsied with a cold forceps for histologyDistal esophagus, biopsies: Mild chronic active esophagogastritis. No evidence of intestinal metaplasia, dysplasia. Antrum, bx, Mild chronic gastritis    FOOT SURGERY Left      INCISION AND DRAINAGE OF WOUND Left 09/2007     spider bite                 Prior to Admission medications    Medication Sig Start Date End Date Taking? Authorizing Provider   ascorbic acid (VITAMIN C) 1000 MG tablet Take 1 tablet by mouth Daily. 8/25/23         bumetanide (BUMEX) 1 MG tablet Take 1 tablet by mouth 2 (Two) Times a Day for 30 days. 7/3/24 8/2/24   Josué De Oliveira MD   busPIRone (BUSPAR) 10 MG tablet Take 1 tablet by mouth 2 (Two) Times a Day.       ProviderBossman MD   calcitriol (ROCALTROL) 0.5 MCG capsule Take 1 capsule by mouth Daily. 2/23/23         carvedilol (COREG) 3.125 MG tablet Take 1 tablet twice a day by oral route. 3/26/24         Diclofenac Sodium (VOLTAREN) 1 % gel gel Apply 2 g topically to the appropriate area as directed 4 (Four) Times a Day As Needed. 6/1/23         donepezil (ARICEPT) 10 MG tablet Take 1 tablet by mouth Daily. 5/3/24         DULoxetine (CYMBALTA) 60 MG capsule Take 1 capsule by mouth Daily. 3/11/24         famotidine (PEPCID) 20 MG tablet Take 1 tablet twice a day by oral route. 3/26/24         fluticasone (FLONASE) 50 MCG/ACT nasal spray Instill 1 spray in each nostril as directed by provider Daily. 4/18/24         Insulin Glargine (Lantus SoloStar) 100 UNIT/ML injection pen Inject 20 Units under the skin into the appropriate area as directed every night at bedtime. 5/9/24         Insulin Regular Human, Conc, (HumuLIN R) 500 UNIT/ML solution pen-injector CONCENTRATED injection Inject 15 Units under the skin into the appropriate area as directed 3 (Three) Times a Day Before Meals.       ProviderBossman MD   Insulin Regular Human, Conc, (HumuLIN R) 500 UNIT/ML solution  pen-injector CONCENTRATED injection Inject 40 Units under the skin into the appropriate area as directed 3 (Three) Times a Day Before Meals. Inject 40 units under the skin in the the appropriate area with regular meals AND 40 units with large meals.       Bossman [facility] MD   Iron-Vitamin C (Vitron-C)  MG tablet Take 1 tablet twice a day by oral route. 4/18/24         levocetirizine (XYZAL) 5 MG tablet Take 1 tablet by mouth every day at bedtime. 5/9/24         melatonin 3 MG tablet Take 2 tablets by mouth At Night As Needed for Sleep. 4/11/24     Rene Maloney MD   nitroglycerin (NITROSTAT) 0.4 MG SL tablet Place 1 tablet under the tongue Every 5 (Five) Minutes As Needed for Chest Pain. Take no more than 3 doses in 15 minutes.       Bossman [facility] MD   oxyCODONE (ROXICODONE) 10 MG tablet Take 1 tablet by mouth 2 (Two) Times a Day As Needed. Must last 30 days per md. 7/24/24         pantoprazole (PROTONIX) 40 MG EC tablet Take 1 tablet by mouth Every 12 (Twelve) Hours. 6/19/24         polyethylene glycol (MIRALAX) 17 GM/SCOOP powder Take 17 g by mouth Daily As Needed (constipation).       Bossman [facility] MD   pregabalin (LYRICA) 100 MG capsule Take 2 capsules by mouth Every Night.       Bossman Blount MD   pregabalin (LYRICA) 100 MG capsule Take 1 capsule by mouth Every Morning, THEN 2 capsules Every Evening. 7/24/24 9/21/24       rosuvastatin (CRESTOR) 10 MG tablet Take 1 tablet by mouth Every Night. 5/9/24         sennosides-docusate (PERICOLACE) 8.6-50 MG per tablet Take 1 tablet by mouth Every Night. Obtain OTC 8/23/23     Lexie Houston APRN   sodium hypochlorite (DAKIN'S 1/4 STRENGTH) 0.125 % solution topical solution 0.125% Apply 1 application topically to the appropriate area as directed 2 (Two) Times a Day. 5/13/24         sucralfate (CARAFATE) 1 g tablet Take 1 tablet by mouth 4 (Four) Times a Day before meals. 5/3/24         tamsulosin (FLOMAX) 0.4 MG capsule 24 hr  "capsule Take 1 capsule by mouth Daily. 23         spironolactone (ALDACTONE) 25 MG tablet Take 1 tablet by mouth Daily. 20   Del Shetty MD      Allergies         Allergies   Allergen Reactions    Cefepime Hives and Anaphylaxis    Bactrim [Sulfamethoxazole-Trimethoprim] Other (See Comments)       \"RENAL FAILURE\"    Vancomycin Itching    Zolpidem Mental Status Change       \"makes him crazy\"    Metronidazole Rash         Social History            Tobacco Use    Smoking status: Former       Current packs/day: 0.00       Types: Cigarettes       Quit date:        Years since quittin.6    Smokeless tobacco: Never    Tobacco comments:       smoked in [place]   Substance Use Topics    Alcohol use: No            Family History   Problem Relation Age of Onset    Colon cancer Father      Heart disease Father      Colon cancer Sister      Colon polyps Sister      Alzheimer's disease Mother      Coronary artery disease Sister      Coronary artery disease Sister        Review of Systems: All pertinent negatives and positives as noted above.  Otherwise, all systems reviewed and found to be negative.     Physical Exam:  /72   Pulse 78   Temp 97.6 °F (36.4 °C) (Oral)   Resp 20   Ht 182.9 cm (72\")   Wt 130 kg (286 lb 13.1 oz)   SpO2 97%   BMI 38.90 kg/m²   Temp:  [97.6 °F (36.4 °C)-99.5 °F (37.5 °C)] 97.6 °F (36.4 °C)  Heart Rate:  [] 78  Resp:  [20-25] 20  BP: ()/(46-75) 131/72     Gen.: Chronically ill in appearance, awake and interactive, appears to be in no acute distress  HEENT: Normocephalic, atraumatic, pupils equally round and reactive to light, oropharynx clear, mucous membranes dry  Neck: Large circumference, no audible carotid bruits, no obvious elevation of JVP  CV: Irregular, normal rate, no audible murmurs appreciated  Pulmonary: Decreased breath sounds bilaterally, equal chest expansion with inspiration, no obvious wheezes, rhonchi, rales  GI: Morbidly obese, " mild tenderness in bilateral lower quadrants with no rebound or guarding, active bowel sounds, nondistended  Extremities: Ulcerative type lesions noted on toes on both feet, dressings in place on bilateral heels, edema noted  Neurologic: Cranial nerves are grossly intact, patient moves all 4 extremities equally during examination     Diagnostic Data:           Lab Results   Component Value Date     WBC 12.83 (H) 08/01/2024     HGB 9.0 (L) 08/01/2024     HCT 28.3 (L) 08/01/2024     MCV 83.7 08/01/2024      08/01/2024            Lab Results   Component Value Date     GLUCOSE 370 (H) 08/01/2024     CALCIUM 8.8 08/01/2024      08/01/2024     K 4.1 08/01/2024     CO2 21.0 (L) 08/01/2024      08/01/2024     BUN 50 (H) 08/01/2024     CREATININE 2.28 (H) 08/01/2024     EGFR 30.5 (L) 08/01/2024     BCR 21.9 08/01/2024     ANIONGAP 14.0 08/01/2024      Glucose on arrival of 704 with an anion gap of 16     High-sensitivity troponin: 342, 353           Lab Results   Component Value Date     HGBA1C 12.60 (H) 07/31/2024      Urinalysis with 3+ glucose, trace ketones, trace blood, positive nitrite, 2+ protein, 3-5 red blood cells, 6-10 white blood cells, 2+ bacteria     Chest x-ray on arrival: Hypoventilation with vascular crowding.  Mild bronchial wall thickening, stable     Left foot x-ray:  1. Deep ulcer on the sole of the foot posteriorly near the calcaneus.  There is a questionable small area of bony erosion along the plantar  surface of the calcaneus just proximal to the plantar calcaneal spur.  Osteomyelitis cannot be ruled out. The soft tissue ulcer is packed with  some type of radiopaque material.  2. Linear foreign bodies projected over the soft tissues of the second  toe and first toe. Artifacts are also included in the differential.  3. Other chronic changes, as discussed.     CT abdomen and pelvis:  1. A significant perinephric fat stranding is similar to the previous  study and is a nonspecific  finding. Possibility for chronic inflammatory  process/chronic pyelonephritis may not be excluded. Renal functions are  normal and symmetrical.  2. Fatty infiltration of the pancreas. No mass. No ductal dilatation.  3. Nonspecific abdominal, pelvic and inguinal lymphadenopathy. The  etiology and clinical significance is not certain. This appears  moderately more progressive since the previous study.  4. Diffuse subcutaneous fat infiltration of the abdominal wall extending  into the lower extremity may represent fluid overload?.  5. A significant large volume of stool in the colon without evidence of  obstruction. This may represent constipation.     CT head: No acute process     ECG on arrival: Atrial fibrillation, ventricular rate 128, left axis deviation, poor R wave progression, nonspecific ST changes     Results for orders placed during the hospital encounter of 03/26/24     Adult Transthoracic Echo Complete W/ Cont if Necessary Per Protocol     Interpretation Summary    The study is technically difficult for diagnosis.  If there is concern for possible infective endocarditis, recommend transesophageal echocardiogram to better visualize the valves.    Left ventricular systolic function is normal. Left ventricular ejection fraction appears to be 61 - 65%.    Left ventricular wall thickness is consistent with mild concentric hypertrophy    Left ventricular diastolic function is consistent with (grade III w/high LAP) fixed restrictive pattern.    Mildly reduced right ventricular systolic function noted.    The left atrial cavity is mildly dilated.    There is mild calcification of the aortic valve. No aortic valve regurgitation is present. No hemodynamically significant aortic valve stenosis is present.     ASSESSMENT/PLAN:     1.  Altered mental status due to problem #2, most likely  2.  Diabetic ketoacidosis  3.  Urinary tract infection  4.  Diabetic foot ulcer with radiographic concern for osteomyelitis  5.   "Atrial fibrillation with rapid ventricular response, present on arrival  6.  Elevated troponin: No ischemic symptoms, therefore considered to represent myocardial injury in the setting of the above mentioned medical problems  7.  Abdominal pain: Essentially chronic for this patient.  CT scan findings noted above  8.  Stage IV chronic kidney disease     -We are asked to see this patient with atrial fibrillation and elevated heart rate along with elevated troponin: The patient had elevated heart rate upon arrival which is not surprising given his host of medical issues present on arrival including all of the above-mentioned problems.  His heart rate is now improved and the patient is otherwise asymptomatic.  Therefore, I would not recommend any further testing or changes in therapy regarding atrial fibrillation.  Regarding stroke risk reduction, he has not historically been anticoagulated as he is thought to be a very poor candidate for anticoagulation given his host of medical issues including multiple hospital admissions, infections, renal insufficiency, bouts of altered mental status, etc.  Therefore, I would not recommend therapeutic anticoagulation at this time for the patient.  -In regards to elevated troponin: The patient frankly denies chest pain.  I am not certain why this was checked by the emergency department provider other than simply following a \"protocol\" to check troponin levels on patient to come to the emergency department.  Without any suspicion of myocardial ischemia, there is simply not a good reason to check a troponin level in this particular patient who has simply had an elevated troponin level at almost every visit to the hospital but more importantly, in this particular case, with worsening renal function on arrival, diabetic ketoacidosis, atrial fibrillation with elevated heart rate, it is not surprising to see a troponin elevation significantly higher than what was previously appreciated.  " At this time, no ischemic testing will be offered as the patient simply does not have any ischemic symptoms and I do not feel that this would be beneficial at this time.  -No further cardiac needs at the present time therefore we will be available as needed.  Please feel free to call if there are further questions.                      Encounter Date    7/31/24    XR Foot 3+ View Left [[id number]] (Order [id number])  Order  Status: Final result     Patient Location    Patient Class Location   Inpatient  [facility], [facility], 1     [phone]     Appointment Information    PACS Images     Radiology Images  Study Result    Narrative & Impression   EXAMINATION:  XR FOOT 3+ VW LEFT-  7/31/2024 6:22 PM     HISTORY: Heel wound.     COMPARISON: 3/26/2024.     TECHNIQUE: 3 views were obtained.     FINDINGS: There is a deep ulcer on the sole of the foot posteriorly. The  ulcer appears to be packed with some type of radiopaque material. On the  lateral image, there may be a small area of bony erosion involving the  calcaneus just posterior to the plantar calcaneal spur. A small area of  osteomyelitis is not ruled out. There has been prior amputation of the  fifth toe and distal fifth metatarsal. There may have been prior  resection of the distal fourth metacarpal and a portion of the proximal  phalanx of the fourth toe versus chronic erosive change. The appearance  is stable. There is severe narrowing of the first MTP joint. There is  narrowing of some of the interphalangeal joints. There is some spurring  in the tarsal region and at the tarsal-metatarsal junction. There is  soft tissue swelling of the foot diffusely. There is a small curvilinear  foreign body in the soft tissues along the sole of the foot in the  second toe region in the proximal phalanx area. There is another small  linear foreign body in the soft tissues adjacent to the distal phalanx  of the great toe.        IMPRESSION:  1. Deep ulcer on the sole of the  foot posteriorly near the calcaneus.  There is a questionable small area of bony erosion along the plantar  surface of the calcaneus just proximal to the plantar calcaneal spur.  Osteomyelitis cannot be ruled out. The soft tissue ulcer is packed with  some type of radiopaque material.  2. Linear foreign bodies projected over the soft tissues of the second  toe and first toe. Artifacts are also included in the differential.  3. Other chronic changes, as discussed.        This report was signed and finalized on 7/31/2024 7:47 PM by Dr. Raz Mendes MD.              Encounter Date    7/31/24    XR Chest 1 View [[id number]] (Order [id number])  Order  Status: Final result     Patient Location    Patient Class Location   Inpatient [facility], [facility], 1     [phone]     Appointment Information    PACS Images     Radiology Images  Study Result    Narrative & Impression   EXAMINATION:  XR CHEST 1 VW-  7/31/2024 6:22 PM     HISTORY: Altered mental status. Hypertension and diabetes.     COMPARISON: 6/28/2024.     TECHNIQUE: Single view AP image.     FINDINGS: There is hypoventilation with vascular crowding. There is mild  bronchial wall thickening, stable. There is no dense infiltrate or  effusion. Heart size is borderline. Prior heart bypass surgery. Prior  cervical fusion. No definite acute bony abnormality.        IMPRESSION:  1. Hypoventilation with vascular crowding.  2. Mild bronchial wall thickening, stable.           This report was signed and finalized on 7/31/2024 7:41 PM by Dr. Raz Mendes MD.            4 2031 -- 97 -- 127/74 -- -- --   07/31/24 2017 -- 95 21 124/63 Sitting room air 99   07/31/24 1940 -- 106 -- -- -- -- 97   07/31/24 1934 -- 115 -- 154/69 -- -- --   07/31/24 1929 -- 98 -- 154/69 -- -- 96   07/31/24 1914 -- 112 -- 121/65 -- -- 99   07/31/24 1859 -- 123 Abnormal  -- 142/46 -- -- 92   07/31/24 18:46:04 99.5 (37.5) 122 Abnormal  25 150/74 Lying room air 94        ate/Time Temp Pulse Resp  BP Patient Position Device (Oxygen Therapy) SpO2   08/01/24 0830 96.4 (35.8) 72 16 125/75 -- room air 97   08/01/24 0700 -- 78 -- 131/72 -- -- 97   08/01/24 0600 -- 77 -- 130/75 -- -- 100   08/01/24 0500 -- 73 -- 124/69 -- -- --   08                    Basic Metabolic Panel: Patient Communication     Released  Not seen     Results   Basic Metabolic Panel (Order [id number])   Contains abnormal data Basic Metabolic Panel  Order: [id number]  Status: Final result       Visible to patient: Yes (not seen)    Specimen Information: Blood   0 Result Notes   important suggestion  Newer results are available. Click to view them now.                 Component  Ref Range & Units 00:21 00:10 1 d ago 1 d ago 1 d ago 1 d ago 1 d ago   Glucose  65 - 99 mg/dL 370 High  353 High  R,  High  R,  High  R,  High Critical  R,  High Critical  575 High Critical  R, CM   BUN  8 - 23 mg/dL 52 High      54 High     Creatinine  0.76 - 1.27 mg/dL 2.46 High      2.64 High     Sodium  136 - 145 mmol/L 138     133 Low     Potassium  3.5 - 5.2 mmol/L 4.1     4.4    Comment: Specimen hemolyzed.  Result may be falsely elevated.   Chloride  98 - 107 mmol/L 103     98    CO2  22.0 - 29.0 mmol/L 21.0 Low      20.0 Low     Calcium  8.6 - 10.5 mg/dL 8.8     8.9    BUN/Creatinine Ratio  7.0 - 25.0 21.1     20.5    Anion Gap  5.0 - 15.0 mmol/L 14.0     15.0    eGFR  >60.0 mL/min/1.73 27.8 Low                   Ref Range & Units 04:19  (8/1/24) 1 d ago  (7/31/24) 7 d ago  (7/25/24) 1 mo ago  (6/30/24) 1 mo ago  (6/29/24) 1 mo ago  (6/28/24) 1 mo ago  (6/27/24)   WBC  3.40 - 10.80 10*3/mm3 12.83 High  15.48 High  8.92 6.79 7.68 10.18 8.28   RBC  4.14 - 5.80 10*6/mm3 3.38 Low  3.55 Low  4.03 Low  3.92 Low  3.75 Low  3.87 Low  3.72 Low    Hemoglobin  13.0 - 17.7 g/dL 9.0 Low  9.8 Low  11.2 Low  10.6 Low  10.1 Low  10.5 Low  10.3 Low    Hematocrit  37.5 - 51.0 % 28.3 Low  29.6 Low  34.9 Low  34.1 Low  32.5 Low  33.5 Low  32.5 Low    MCV  79.0  - 97.0 fL 83.7 83.4 86.6 87.0 86.7 86.6 87.4   MCH  26.6 - 33.0 pg 26.6 27.6 27.8 27.0 26.9 27.1 27.7   MCHC  31.5 - 35.7 g/dL 31.8 33.1 32.1 31.1 Low  31.1 Low  31.3 Low  31.7   RDW  12.3 - 15.4 % 14.5 14.5 14.8 14.7 14.6 14.6 14.9   RDW-SD  37.0 - 54.0 fl 44.3 43.8 47.0 47.4 46.1 46.5 48.0   MPV  6.0 - 12.0 fL 12.7 High  13.1 High  11.6 11.7 12.0 12.0 11.9   Platelets  140 - 450 10*3/mm3 176 201 223 238 215 252 215   Neutrophil %  42.7 - 76.0 % 79.2 High  88.7 High  55.0 58.1 73.6 71.6 67.9   Lymphocyte %  19.6 - 45.3 % 10.1 Low  2.9 Low  2             Current Facility-Administered Medications   Medication Dose Route Frequency Provider Last Rate Last Admin    sennosides-docusate (PERICOLACE) 8.6-50 MG per tablet 2 tablet  2 tablet Oral BID Lamine Figueroa APRN   2 tablet at 08/01/24 0829    And    polyethylene glycol (MIRALAX) packet 17 g  17 g Oral Daily PRN Lamine Figueroa APRN        And    bisacodyl (DULCOLAX) EC tablet 5 mg  5 mg Oral Daily PRN Lamine Figueroa APRN        And    bisacodyl (DULCOLAX) suppository 10 mg  10 mg Rectal Daily PRN Lamine Figueroa APRN        Calcium Replacement - Follow Nurse / [facility] Driven Protocol   Does not apply PRN Abebe Garzon DO        [START ON 8/2/2024] Chlorhexidine Gluconate Cloth 2 % pads 1 Application  1 Application Topical Q24H Lamine Figueroa APRN        dextrose (D50W) (25 g/50 mL) IV injection 10-50 mL  10-50 mL Intravenous Q15 Min PRN Abebe Garzon DO        dextrose (D50W) (25 g/50 mL) IV injection 25 g  25 g Intravenous Q15 Min PRN Lamine Figueroa APRN        dextrose (GLUTOSE) oral gel 15 g  15 g Oral Q15 Min PRN Lamine Figueroa APRN        dilTIAZem (CARDIZEM) 125 mg in 125 mL D5W infusion  5-15 mg/hr Intravenous Titrated Abebe Garzon DO   Stopped at 08/01/24 0018    Enoxaparin Sodium (LOVENOX) syringe 135 mg  1 mg/kg Subcutaneous Q12H Abebe Garzon DO   135 mg at 08/01/24 0830    glucagon (GLUCAGEN) injection 1 mg  1 mg  Intramuscular Q15 Min PRN Lamine Figueroa APRN        insulin regular (humuLIN R,novoLIN R) injection 3-14 Units  3-14 Units Subcutaneous 4x Daily AC & at Bedtime Keren Jamison MD        Magnesium Standard Dose Replacement - Follow Nurse / [facility] Driven Protocol   Does not apply PRN Abebe Garzon DO        mupirocin (BACTROBAN) 2 % nasal ointment 1 Application  1 Application Each Nare BID Lamine Figueroa APRN   1 Application at 08/01/24 0153    nitroglycerin (NITROSTAT) SL tablet 0.4 mg  0.4 mg Sublingual Q5 Min PRN Lamine Figueroa APRN        Pharmacy to Dose enoxaparin (LOVENOX)   Does not apply Continuous PRN Abebe Garzon DO        Pharmacy to dose vancomycin   Does not apply Continuous PRN Lamine Figueroa APRN        Pharmacy to Dose Zosyn   Does not apply Continuous PRN Lamine Figueroa APRN        Phosphorus Replacement - Follow Nurse / [facility] Driven Protocol   Does not apply PRN Abebe Garzon DO        piperacillin-tazobactam (ZOSYN) 3.375 g IVPB in 100 mL NS MBP (CD)  3.375 g Intravenous Q8H Lamine Figueroa APRN   3.375 g at 08/01/24 0153    Potassium Replacement - Follow Nurse / [facility] Driven Protocol   Does not apply PRN Abebe Garzon DO        sodium chloride 0.9 % flush 10 mL  10 mL Intravenous Q12H Abebe Garzon DO   10 mL at 08/01/24 0829    sodium chloride 0.9 % flush 10 mL  10 mL Intravenous PRN Abebe Garzon DO        sodium chloride 0.9 % flush 10 mL  10 mL Intravenous Q12H Lamine Figueroa APRN   10 mL at 08/01/24 0829    sodium chloride 0.9 % flush 10 mL  10 mL Intravenous PRN Lamine Figueroa APRN        sodium chloride 0.9 % infusion 40 mL  40 mL Intravenous PRN Abebe Garzon DO        sodium chloride 0.9 % infusion 40 mL  40 mL Intravenous PRN Lamine Figueroa APRN        vancomycin 750 mg/250 mL 0.9% NS IVPB ([facility])  750 mg Intravenous Q24H Lamine Figueroa APRN

## 2024-08-01 NOTE — CONSULTS
Inpatient Cardiology Consult  Consult performed by: Emeka Garay MD  Consult ordered by: Lamine Figueroa APRN  Reason for consult: A-fib RVR, elevated troponin        Chief Complaint   Patient presents with    Altered Mental Status     HPI: This is a 69-year-old male who is well-known to me as we follow him for coronary artery disease as well as atrial fibrillation in the outpatient setting.  Sadly, the patient has been admitted multiple times to the hospital in recent years for a host of complaints.  He presented this time with altered mental status and was found to be in what appears to be diabetic ketoacidosis.  The patient this morning says he is feeling very poorly and has some lower abdominal pain but denies having chest discomfort or palpitations.  Upon arrival, his heart rate was elevated he tells me that he was not experiencing palpitations although he was also noted to be altered upon arrival.  In any event, he notes no palpitations or chest discomfort at this time.  He notes breathing being relatively stable although historically, this patient has endorsed mild shortness of breath and dyspnea with exertion.  He has some degree of chronic lower extremity edema which he tells me is essentially the same as usual.  He notes that he has been compliant with his medications but his blood glucose is out of control, at least upon arrival and his hemoglobin A1c shows poor control of blood glucose.  In any event, cardiology has been asked to see the patient regarding the atrial fibrillation with elevated heart rate on arrival and the elevated troponin that was found in his workup.    Overnight, he has been admitted to the intensive care unit/CCU and his blood sugars have improved.  He is still feeling very poorly this morning but other than some lower quadrant abdominal pain, he endorses no other symptoms, particularly of the cardiac nature at this particular time.    Past Medical History:   Diagnosis  Date    Arthritis     Autonomic disease     CAD (coronary artery disease) 02/06/2017    Cervical radiculopathy 09/16/2021    Chronic constipation with acute exaccerbation 05/10/2021    Coronary artery disease     Degeneration of cervical intervertebral disc 08/11/2021    Diabetes mellitus     Diabetic foot ulcer 08/31/2020    Diabetic polyneuropathy associated with type 2 diabetes mellitus 01/18/2021    Elevated cholesterol     Gastroesophageal reflux disease 05/13/2019    Headache     HTN (hypertension), benign 05/03/2017    Hyperlipidemia     Hypertension     Mixed hyperlipidemia 02/07/2017    Multiple lung nodules 01/26/2020    5mm, 9 mm RLL identified 1/2020, not present 10/2019.    Myocardial infarction     Osteomyelitis 01/22/2020    Osteomyelitis of fifth toe of right foot 10/07/2019    Pancreatitis     Persistent insomnia 01/20/2020    Renal disorder     Sleep apnea 02/06/2017    Sleep apnea with use of continuous positive airway pressure (CPAP)     NON-COMPLIANT    Slow transit constipation 01/16/2019    Spinal stenosis in cervical region 09/16/2021    Vitamin D deficiency 03/02/2021     Past Surgical History:   Procedure Laterality Date    ABDOMINAL SURGERY      AMPUTATION FOOT / TOE Left 10/2021    5th digit     ANTERIOR CERVICAL DISCECTOMY W/ FUSION N/A 08/05/2022    Procedure: CERVICAL DISCECTOMY ANTERIOR WITH FUSION C5-6 with possible plating of C5-7 with neuromonitoring and 1 c-arm;  Surgeon: Karel Soliz MD;  Location:  [facility] OR;  Service: Neurosurgery;  Laterality: N/A;    APPENDECTOMY      BACK SURGERY      CARDIAC CATHETERIZATION Left 02/08/2021    Procedure: Left Heart Cath w poss intervention left anatomical snuff box acess;  Surgeon: Omkar Charles DO;  Location:  [facility] CATH INVASIVE LOCATION;  Service: Cardiology;  Laterality: Left;    CARDIAC SURGERY      CATARACT EXTRACTION      CERVICAL SPINE SURGERY      COLONOSCOPY N/A 01/31/2017    Normal exam repeat in 5 years     COLONOSCOPY N/A 02/11/2019    Mild acute inflammation    COLONOSCOPY N/A 04/07/2024    2 areas at 10 and 20 cm with friability ulceration 2 clips placed at 20 cm and 4 clips at 10 cm poor prep normal mucosa,mild eroisions and ulcerations in visible vessels    COLONOSCOPY N/A 7/1/2024    Procedure: COLONOSCOPY WITH ANESTHESIA;  Surgeon: Arsalan Lorenzo DO;  Location: [facility] ENDOSCOPY;  Service: Gastroenterology;  Laterality: N/A;  pre op constipation/diarrhea  post poor prep  pcp Del Shetty    COLONOSCOPY N/A 7/2/2024    Procedure: COLONOSCOPY WITH ANESTHESIA;  Surgeon: Agapito Christopher MD;  Location: [facility] ENDOSCOPY;  Service: Gastroenterology;  Laterality: N/A;  pre rectal bleeding  post poor prep  pcp Del Shetty MD    COLONOSCOPY W/ POLYPECTOMY  03/04/2014    Hyperplastic polyp    CORONARY ARTERY BYPASS GRAFT  10/2015    ENDOSCOPY  04/13/2011    Gastritis with hemorrhage    ENDOSCOPY N/A 05/05/2017    Normal exam    ENDOSCOPY N/A 02/11/2019    Gastritis    ENDOSCOPY N/A 09/01/2020    Non-erosive gastritis with hemorrhage    ENDOSCOPY N/A 02/10/2021    Esophagitis    ENDOSCOPY N/A 04/11/2024    There were esophageal mucosal changes suspicious for short-segment Low's esophagus present in the distal esophagus. The maximum longitudinal extent of these mucosal changes was 2 cm in length. Mucosa was biopsied with a cold forceps for histologyDistal esophagus, biopsies: Mild chronic active esophagogastritis. No evidence of intestinal metaplasia, dysplasia. Antrum, bx, Mild chronic gastritis    FOOT SURGERY Left     INCISION AND DRAINAGE OF WOUND Left 09/2007    spider bite     Prior to Admission medications    Medication Sig Start Date End Date Taking? Authorizing Provider   ascorbic acid (VITAMIN C) 1000 MG tablet Take 1 tablet by mouth Daily. 8/25/23      bumetanide (BUMEX) 1 MG tablet Take 1 tablet by mouth 2 (Two) Times a Day for 30 days. 7/3/24 8/2/24  Josué De Oliveira MD   busPIRone (BUSPAR) 10  MG tablet Take 1 tablet by mouth 2 (Two) Times a Day.    Bossman Blount MD   calcitriol (ROCALTROL) 0.5 MCG capsule Take 1 capsule by mouth Daily. 2/23/23      carvedilol (COREG) 3.125 MG tablet Take 1 tablet twice a day by oral route. 3/26/24      Diclofenac Sodium (VOLTAREN) 1 % gel gel Apply 2 g topically to the appropriate area as directed 4 (Four) Times a Day As Needed. 6/1/23      donepezil (ARICEPT) 10 MG tablet Take 1 tablet by mouth Daily. 5/3/24      DULoxetine (CYMBALTA) 60 MG capsule Take 1 capsule by mouth Daily. 3/11/24      famotidine (PEPCID) 20 MG tablet Take 1 tablet twice a day by oral route. 3/26/24      fluticasone (FLONASE) 50 MCG/ACT nasal spray Instill 1 spray in each nostril as directed by provider Daily. 4/18/24      Insulin Glargine (Lantus SoloStar) 100 UNIT/ML injection pen Inject 20 Units under the skin into the appropriate area as directed every night at bedtime. 5/9/24      Insulin Regular Human, Conc, (HumuLIN R) 500 UNIT/ML solution pen-injector CONCENTRATED injection Inject 15 Units under the skin into the appropriate area as directed 3 (Three) Times a Day Before Meals.    Bossman Blount MD   Insulin Regular Human, Conc, (HumuLIN R) 500 UNIT/ML solution pen-injector CONCENTRATED injection Inject 40 Units under the skin into the appropriate area as directed 3 (Three) Times a Day Before Meals. Inject 40 units under the skin in the the appropriate area with regular meals AND 40 units with large meals.    Bossman Blount MD   Iron-Vitamin C (Vitron-C)  MG tablet Take 1 tablet twice a day by oral route. 4/18/24      levocetirizine (XYZAL) 5 MG tablet Take 1 tablet by mouth every day at bedtime. 5/9/24      melatonin 3 MG tablet Take 2 tablets by mouth At Night As Needed for Sleep. 4/11/24   Rene Maloney MD   nitroglycerin (NITROSTAT) 0.4 MG SL tablet Place 1 tablet under the tongue Every 5 (Five) Minutes As Needed for Chest Pain. Take no more than 3  "doses in 15 minutes.    ProviderBossman MD   oxyCODONE (ROXICODONE) 10 MG tablet Take 1 tablet by mouth 2 (Two) Times a Day As Needed. Must last 30 days per md. 24      pantoprazole (PROTONIX) 40 MG EC tablet Take 1 tablet by mouth Every 12 (Twelve) Hours. 24      polyethylene glycol (MIRALAX) 17 GM/SCOOP powder Take 17 g by mouth Daily As Needed (constipation).    Bossman Blount MD   pregabalin (LYRICA) 100 MG capsule Take 2 capsules by mouth Every Night.    Bossman Blount MD   pregabalin (LYRICA) 100 MG capsule Take 1 capsule by mouth Every Morning, THEN 2 capsules Every Evening. 24     rosuvastatin (CRESTOR) 10 MG tablet Take 1 tablet by mouth Every Night. 24      sennosides-docusate (PERICOLACE) 8.6-50 MG per tablet Take 1 tablet by mouth Every Night. Obtain OTC 23   Lexie Houston APRN   sodium hypochlorite (DAKIN'S 1/4 STRENGTH) 0.125 % solution topical solution 0.125% Apply 1 application topically to the appropriate area as directed 2 (Two) Times a Day. 24      sucralfate (CARAFATE) 1 g tablet Take 1 tablet by mouth 4 (Four) Times a Day before meals. 5/3/24      tamsulosin (FLOMAX) 0.4 MG capsule 24 hr capsule Take 1 capsule by mouth Daily. 23      spironolactone (ALDACTONE) 25 MG tablet Take 1 tablet by mouth Daily. 20  Del Shetty MD     Allergies   Allergen Reactions    Cefepime Hives and Anaphylaxis    Bactrim [Sulfamethoxazole-Trimethoprim] Other (See Comments)     \"RENAL FAILURE\"    Vancomycin Itching    Zolpidem Mental Status Change     \"makes him crazy\"    Metronidazole Rash     Social History     Tobacco Use    Smoking status: Former     Current packs/day: 0.00     Types: Cigarettes     Quit date:      Years since quittin.6    Smokeless tobacco: Never    Tobacco comments:     [place]   Substance Use Topics    Alcohol use: No     Family History   Problem Relation Age of Onset    Colon cancer Father     " "Heart disease Father     Colon cancer Sister     Colon polyps Sister     Alzheimer's disease Mother     Coronary artery disease Sister     Coronary artery disease Sister      Review of Systems: All pertinent negatives and positives as noted above.  Otherwise, all systems reviewed and found to be negative.    Physical Exam:  /72   Pulse 78   Temp 97.6 °F (36.4 °C) (Oral)   Resp 20   Ht 182.9 cm (72\")   Wt 130 kg (286 lb 13.1 oz)   SpO2 97%   BMI 38.90 kg/m²   Temp:  [97.6 °F (36.4 °C)-99.5 °F (37.5 °C)] 97.6 °F (36.4 °C)  Heart Rate:  [] 78  Resp:  [20-25] 20  BP: ()/(46-75) 131/72    Gen.: Chronically ill in appearance, awake and interactive, appears to be in no acute distress  HEENT: Normocephalic, atraumatic, pupils equally round and reactive to light, oropharynx clear, mucous membranes dry  Neck: Large circumference, no audible carotid bruits, no obvious elevation of JVP  CV: Irregular, normal rate, no audible murmurs appreciated  Pulmonary: Decreased breath sounds bilaterally, equal chest expansion with inspiration, no obvious wheezes, rhonchi, rales  GI: Morbidly obese, mild tenderness in bilateral lower quadrants with no rebound or guarding, active bowel sounds, nondistended  Extremities: Ulcerative type lesions noted on toes on both feet, dressings in place on bilateral heels, edema noted  Neurologic: Cranial nerves are grossly intact, patient moves all 4 extremities equally during examination    Diagnostic Data:    Lab Results   Component Value Date    WBC 12.83 (H) 08/01/2024    HGB 9.0 (L) 08/01/2024    HCT 28.3 (L) 08/01/2024    MCV 83.7 08/01/2024     08/01/2024     Lab Results   Component Value Date    GLUCOSE 370 (H) 08/01/2024    CALCIUM 8.8 08/01/2024     08/01/2024    K 4.1 08/01/2024    CO2 21.0 (L) 08/01/2024     08/01/2024    BUN 50 (H) 08/01/2024    CREATININE 2.28 (H) 08/01/2024    EGFR 30.5 (L) 08/01/2024    BCR 21.9 08/01/2024    ANIONGAP 14.0 " 08/01/2024     Glucose on arrival of 704 with an anion gap of 16    High-sensitivity troponin: 342, 353    Lab Results   Component Value Date    HGBA1C 12.60 (H) 07/31/2024     Urinalysis with 3+ glucose, trace ketones, trace blood, positive nitrite, 2+ protein, 3-5 red blood cells, 6-10 white blood cells, 2+ bacteria    Chest x-ray on arrival: Hypoventilation with vascular crowding.  Mild bronchial wall thickening, stable    Left foot x-ray:  1. Deep ulcer on the sole of the foot posteriorly near the calcaneus.  There is a questionable small area of bony erosion along the plantar  surface of the calcaneus just proximal to the plantar calcaneal spur.  Osteomyelitis cannot be ruled out. The soft tissue ulcer is packed with  some type of radiopaque material.  2. Linear foreign bodies projected over the soft tissues of the second  toe and first toe. Artifacts are also included in the differential.  3. Other chronic changes, as discussed.    CT abdomen and pelvis:  1. A significant perinephric fat stranding is similar to the previous  study and is a nonspecific finding. Possibility for chronic inflammatory  process/chronic pyelonephritis may not be excluded. Renal functions are  normal and symmetrical.  2. Fatty infiltration of the pancreas. No mass. No ductal dilatation.  3. Nonspecific abdominal, pelvic and inguinal lymphadenopathy. The  etiology and clinical significance is not certain. This appears  moderately more progressive since the previous study.  4. Diffuse subcutaneous fat infiltration of the abdominal wall extending  into the lower extremity may represent fluid overload?.  5. A significant large volume of stool in the colon without evidence of  obstruction. This may represent constipation.    CT head: No acute process    ECG on arrival: Atrial fibrillation, ventricular rate 128, left axis deviation, poor R wave progression, nonspecific ST changes    Results for orders placed during the hospital encounter of  03/26/24    Adult Transthoracic Echo Complete W/ Cont if Necessary Per Protocol    Interpretation Summary    The study is technically difficult for diagnosis.  If there is concern for possible infective endocarditis, recommend transesophageal echocardiogram to better visualize the valves.    Left ventricular systolic function is normal. Left ventricular ejection fraction appears to be 61 - 65%.    Left ventricular wall thickness is consistent with mild concentric hypertrophy    Left ventricular diastolic function is consistent with (grade III w/high LAP) fixed restrictive pattern.    Mildly reduced right ventricular systolic function noted.    The left atrial cavity is mildly dilated.    There is mild calcification of the aortic valve. No aortic valve regurgitation is present. No hemodynamically significant aortic valve stenosis is present.    ASSESSMENT/PLAN:    1.  Altered mental status due to problem #2, most likely  2.  Diabetic ketoacidosis  3.  Urinary tract infection  4.  Diabetic foot ulcer with radiographic concern for osteomyelitis  5.  Atrial fibrillation with rapid ventricular response, present on arrival  6.  Elevated troponin: No ischemic symptoms, therefore considered to represent myocardial injury in the setting of the above mentioned medical problems  7.  Abdominal pain: Essentially chronic for this patient.  CT scan findings noted above  8.  Stage IV chronic kidney disease    -We are asked to see this patient with atrial fibrillation and elevated heart rate along with elevated troponin: The patient had elevated heart rate upon arrival which is not surprising given his host of medical issues present on arrival including all of the above-mentioned problems.  His heart rate is now improved and the patient is otherwise asymptomatic.  Therefore, I would not recommend any further testing or changes in therapy regarding atrial fibrillation.  Regarding stroke risk reduction, he has not historically been  "anticoagulated as he is thought to be a very poor candidate for anticoagulation given his host of medical issues including multiple hospital admissions, infections, renal insufficiency, bouts of altered mental status, etc.  Therefore, I would not recommend therapeutic anticoagulation at this time for the patient.  -In regards to elevated troponin: The patient frankly denies chest pain.  I am not certain why this was checked by the emergency department provider other than simply following a \"protocol\" to check troponin levels on patient to come to the emergency department.  Without any suspicion of myocardial ischemia, there is simply not a good reason to check a troponin level in this particular patient who has simply had an elevated troponin level at almost every visit to the hospital but more importantly, in this particular case, with worsening renal function on arrival, diabetic ketoacidosis, atrial fibrillation with elevated heart rate, it is not surprising to see a troponin elevation significantly higher than what was previously appreciated.  At this time, no ischemic testing will be offered as the patient simply does not have any ischemic symptoms and I do not feel that this would be beneficial at this time.  -No further cardiac needs at the present time therefore we will be available as needed.  Please feel free to call if there are further questions.      "

## 2024-08-01 NOTE — PLAN OF CARE
Goal Outcome Evaluation:  Plan of Care Reviewed With: patient        Progress: no change  Outcome Evaluation: Initial nutrition assessment. Pt brought to the ED d/t AMS. Admitted with DKA/HHS. He was on an insulin drip that has now been d/c'd. He has now been ordered a diet of heart healthy, C.CHO. He has a chronic L foot DM ulcer with concern for osteomyelits. He has had multiple admissions recently. He has complained of lower quadrant abdominal pain. No intake yet. He has received DM/cardiac diet education several times during past hospitalizations. Current A1c is 12.6. He has been intermittently confuses. Will follow.

## 2024-08-01 NOTE — CONSULTS
Nephrology ([facility]) Consult Note      Patient:  Erick Luong  YOB: 1955  Date of Service: 8/1/2024  MRN: [id number]   Acct: [id number]   Primary Care Physician: Del Shetty MD  Advance Directive:   Code Status and Medical Interventions: CPR (Attempt to Resuscitate); Full Support   Ordered at: 08/01/24 0053     Level Of Support Discussed With:    Patient     Code Status (Patient has no pulse and is not breathing):    CPR (Attempt to Resuscitate)     Medical Interventions (Patient has pulse or is breathing):    Full Support     Admit Date: 7/31/2024       Hospital Day: 1  Referring Provider: Abebe Garzon DO      Patient Seen, Chart, Consults, Notes, Labs, Radiology studies reviewed.    Chief complaint: Abnormal labs.    Subjective:  Erick Luong is a 69 y.o. male  whom we were consulted for acute kidney injury/chronic kidney disease.  Patient has history of stage IIIb chronic kidney disease, follows me in the office.  Patient has history of chronic diabetic foot ulcer, follows Dr. Gomes in [place].  He has poorly controlled type 2 diabetes, hypertension, coronary artery disease and severe abdominal obesity.  He was found confused, apparently wandering in his garage.  He was brought in the emergency room.  Patient has nonhealing diabetic foot ulcer affecting his left foot with serosanguineous discharge.  His blood glucose level was 700 with A1c is 12%.  He is currently admitted to ICU with a diagnosis of sepsis caused by infected diabetic foot ulcer.  He denies any productive cough or shortness of breath.  He has noticed decreased urine output.  His renal function is worsening and nephrology is consulted.    This morning he was seen in CCU, lying flat, denies any shortness of breath.  His hemodynamics are stable    Allergies:  Cefepime, Bactrim [sulfamethoxazole-trimethoprim], Vancomycin, Zolpidem, and Metronidazole    Home Meds:  Medications Prior to  Admission   Medication Sig Dispense Refill Last Dose    ascorbic acid (VITAMIN C) 1000 MG tablet Take 1 tablet by mouth Daily. 30 tablet 3     bumetanide (BUMEX) 1 MG tablet Take 1 tablet by mouth 2 (Two) Times a Day for 30 days. 60 tablet 0     busPIRone (BUSPAR) 10 MG tablet Take 1 tablet by mouth 2 (Two) Times a Day.       calcitriol (ROCALTROL) 0.5 MCG capsule Take 1 capsule by mouth Daily. 90 capsule 4     carvedilol (COREG) 3.125 MG tablet Take 1 tablet twice a day by oral route. 60 tablet 4     Diclofenac Sodium (VOLTAREN) 1 % gel gel Apply 2 g topically to the appropriate area as directed 4 (Four) Times a Day As Needed. 300 g 11     donepezil (ARICEPT) 10 MG tablet Take 1 tablet by mouth Daily. 90 tablet 1     DULoxetine (CYMBALTA) 60 MG capsule Take 1 capsule by mouth Daily. 90 capsule 4     famotidine (PEPCID) 20 MG tablet Take 1 tablet twice a day by oral route. 180 tablet 4     fluticasone (FLONASE) 50 MCG/ACT nasal spray Instill 1 spray in each nostril as directed by provider Daily. 16 g 1     Insulin Glargine (Lantus SoloStar) 100 UNIT/ML injection pen Inject 20 Units under the skin into the appropriate area as directed every night at bedtime. 15 mL 3     Insulin Regular Human, Conc, (HumuLIN R) 500 UNIT/ML solution pen-injector CONCENTRATED injection Inject 15 Units under the skin into the appropriate area as directed 3 (Three) Times a Day Before Meals.       Insulin Regular Human, Conc, (HumuLIN R) 500 UNIT/ML solution pen-injector CONCENTRATED injection Inject 40 Units under the skin into the appropriate area as directed 3 (Three) Times a Day Before Meals. Inject 40 units under the skin in the the appropriate area with regular meals AND 40 units with large meals.       Iron-Vitamin C (Vitron-C)  MG tablet Take 1 tablet twice a day by oral route. 60 tablet 2     levocetirizine (XYZAL) 5 MG tablet Take 1 tablet by mouth every day at bedtime. 30 tablet 2     melatonin 3 MG tablet Take 2 tablets  by mouth At Night As Needed for Sleep. 30 tablet 0     nitroglycerin (NITROSTAT) 0.4 MG SL tablet Place 1 tablet under the tongue Every 5 (Five) Minutes As Needed for Chest Pain. Take no more than 3 doses in 15 minutes.       oxyCODONE (ROXICODONE) 10 MG tablet Take 1 tablet by mouth 2 (Two) Times a Day As Needed. Must last 30 days per md. 55 tablet 0     pantoprazole (PROTONIX) 40 MG EC tablet Take 1 tablet by mouth Every 12 (Twelve) Hours. 180 tablet 4     polyethylene glycol (MIRALAX) 17 GM/SCOOP powder Take 17 g by mouth Daily As Needed (constipation).       pregabalin (LYRICA) 100 MG capsule Take 2 capsules by mouth Every Night.       pregabalin (LYRICA) 100 MG capsule Take 1 capsule by mouth Every Morning, THEN 2 capsules Every Evening. 90 capsule 0     rosuvastatin (CRESTOR) 10 MG tablet Take 1 tablet by mouth Every Night. 30 tablet 2     sennosides-docusate (PERICOLACE) 8.6-50 MG per tablet Take 1 tablet by mouth Every Night. Obtain OTC       sodium hypochlorite (DAKIN'S 1/4 STRENGTH) 0.125 % solution topical solution 0.125% Apply 1 application topically to the appropriate area as directed 2 (Two) Times a Day. 473 mL 5     sucralfate (CARAFATE) 1 g tablet Take 1 tablet by mouth 4 (Four) Times a Day before meals. 360 tablet 1     tamsulosin (FLOMAX) 0.4 MG capsule 24 hr capsule Take 1 capsule by mouth Daily. 90 capsule 1        Medicines:  Current Facility-Administered Medications   Medication Dose Route Frequency Provider Last Rate Last Admin    sennosides-docusate (PERICOLACE) 8.6-50 MG per tablet 2 tablet  2 tablet Oral BID Lamine Figueroa APRN   2 tablet at 08/01/24 0829    And    polyethylene glycol (MIRALAX) packet 17 g  17 g Oral Daily PRN Lamine Figueroa APRN        And    bisacodyl (DULCOLAX) EC tablet 5 mg  5 mg Oral Daily PRN Lamine Figueroa APRN        And    bisacodyl (DULCOLAX) suppository 10 mg  10 mg Rectal Daily PRN Lamine Figueroa APRN        Calcium Replacement - Follow Nurse / BPA  Driven Protocol   Does not apply PRN Abebe Garzon DO        [START ON 8/2/2024] Chlorhexidine Gluconate Cloth 2 % pads 1 Application  1 Application Topical Q24H Lamine Figueroa APRN        dextrose (D50W) (25 g/50 mL) IV injection 10-50 mL  10-50 mL Intravenous Q15 Min PRN Abebe Garzon DO        dextrose (D50W) (25 g/50 mL) IV injection 25 g  25 g Intravenous Q15 Min PRN Lamine Figueroa APRN        dextrose (GLUTOSE) oral gel 15 g  15 g Oral Q15 Min PRN Lamine Figueroa APRN        dilTIAZem (CARDIZEM) 125 mg in 125 mL D5W infusion  5-15 mg/hr Intravenous Titrated Abebe Garzon DO   Stopped at 08/01/24 0018    Enoxaparin Sodium (LOVENOX) syringe 135 mg  1 mg/kg Subcutaneous Q12H Abebe Garzon DO   135 mg at 08/01/24 0830    glucagon (GLUCAGEN) injection 1 mg  1 mg Intramuscular Q15 Min PRN Lamine Figueroa APRN        insulin glargine (LANTUS, SEMGLEE) injection 10 Units  10 Units Subcutaneous Daily Keren Jamison MD   10 Units at 08/01/24 0954    insulin regular (humuLIN R,novoLIN R) injection 3-14 Units  3-14 Units Subcutaneous 4x Daily AC & at Bedtime Keren Jamison MD        Magnesium Standard Dose Replacement - Follow Nurse / BPA Driven Protocol   Does not apply PRN Abebe Garzon DO        mupirocin (BACTROBAN) 2 % nasal ointment 1 Application  1 Application Each Nare BID Lamine Figueroa APRN   1 Application at 08/01/24 0954    nitroglycerin (NITROSTAT) SL tablet 0.4 mg  0.4 mg Sublingual Q5 Min PRN Lamine Figueroa APRN        Pharmacy to Dose enoxaparin (LOVENOX)   Does not apply Continuous PRN Abebe Garzon DO        Pharmacy to dose vancomycin   Does not apply Continuous PRN Lamine Figueroa APRN        Pharmacy to Dose Zosyn   Does not apply Continuous PRN Lamine Figueroa APRN        Phosphorus Replacement - Follow Nurse / BPA Driven Protocol   Does not apply PRN Abebe Garzon         piperacillin-tazobactam (ZOSYN) 3.375 g IVPB in 100 mL NS MBP (CD)   3.375 g Intravenous Q8H Lamine Figueroa APRN   3.375 g at 08/01/24 0948    Potassium Replacement - Follow Nurse / BPA Driven Protocol   Does not apply PRN Abebe Garzon,         sodium chloride 0.9 % flush 10 mL  10 mL Intravenous Q12H Abebe Garzon,    10 mL at 08/01/24 0829    sodium chloride 0.9 % flush 10 mL  10 mL Intravenous PRN Abebe Garzon,         sodium chloride 0.9 % flush 10 mL  10 mL Intravenous Q12H Lamine Figueroa APRN   10 mL at 08/01/24 0829    sodium chloride 0.9 % flush 10 mL  10 mL Intravenous PRN Lamine Figueroa APRN        sodium chloride 0.9 % infusion 40 mL  40 mL Intravenous PRN Abebe Garzon,         sodium chloride 0.9 % infusion 40 mL  40 mL Intravenous PRN Lamine Figueroa APRN        sodium hypochlorite (DAKIN'S 1/4 STRENGTH) 0.125 % topical solution 0.125% solution   Topical Q12H Aby High APRN        vancomycin 750 mg/250 mL 0.9% NS IVPB ([facility])  750 mg Intravenous Q24H Lamine Figueroa APRN           Past Medical History:  Past Medical History:   Diagnosis Date    Arthritis     Autonomic disease     CAD (coronary artery disease) 02/06/2017    Cervical radiculopathy 09/16/2021    Chronic constipation with acute exaccerbation 05/10/2021    Coronary artery disease     Degeneration of cervical intervertebral disc 08/11/2021    Diabetes mellitus     Diabetic foot ulcer 08/31/2020    Diabetic polyneuropathy associated with type 2 diabetes mellitus 01/18/2021    Elevated cholesterol     Gastroesophageal reflux disease 05/13/2019    Headache     HTN (hypertension), benign 05/03/2017    Hyperlipidemia     Hypertension     Mixed hyperlipidemia 02/07/2017    Multiple lung nodules 01/26/2020    5mm, 9 mm RLL identified 1/2020, not present 10/2019.    Myocardial infarction     Osteomyelitis 01/22/2020    Osteomyelitis of fifth toe of right foot 10/07/2019    Pancreatitis     Persistent insomnia 01/20/2020    Renal disorder     Sleep apnea 02/06/2017     Sleep apnea with use of continuous positive airway pressure (CPAP)     NON-COMPLIANT    Slow transit constipation 01/16/2019    Spinal stenosis in cervical region 09/16/2021    Vitamin D deficiency 03/02/2021       Past Surgical History:  Past Surgical History:   Procedure Laterality Date    ABDOMINAL SURGERY      AMPUTATION FOOT / TOE Left 10/2021    5th digit     ANTERIOR CERVICAL DISCECTOMY W/ FUSION N/A 08/05/2022    Procedure: CERVICAL DISCECTOMY ANTERIOR WITH FUSION C5-6 with possible plating of C5-7 with neuromonitoring and 1 c-arm;  Surgeon: Karel Soliz MD;  Location: [facility] OR;  Service: Neurosurgery;  Laterality: N/A;    APPENDECTOMY      BACK SURGERY      CARDIAC CATHETERIZATION Left 02/08/2021    Procedure: Left Heart Cath w poss intervention left anatomical snuff box acess;  Surgeon: Omkar Charles DO;  Location: [facility] CATH INVASIVE LOCATION;  Service: Cardiology;  Laterality: Left;    CARDIAC SURGERY      CATARACT EXTRACTION      CERVICAL SPINE SURGERY      COLONOSCOPY N/A 01/31/2017    Normal exam repeat in 5 years    COLONOSCOPY N/A 02/11/2019    Mild acute inflammation    COLONOSCOPY N/A 04/07/2024    2 areas at 10 and 20 cm with friability ulceration 2 clips placed at 20 cm and 4 clips at 10 cm poor prep normal mucosa,mild eroisions and ulcerations in visible vessels    COLONOSCOPY N/A 7/1/2024    Procedure: COLONOSCOPY WITH ANESTHESIA;  Surgeon: Arsalan Lorenzo DO;  Location: [facility] ENDOSCOPY;  Service: Gastroenterology;  Laterality: N/A;  pre op constipation/diarrhea  post poor prep  pcp Del Shetty    COLONOSCOPY N/A 7/2/2024    Procedure: COLONOSCOPY WITH ANESTHESIA;  Surgeon: Agapito Christopher MD;  Location: [facility] ENDOSCOPY;  Service: Gastroenterology;  Laterality: N/A;  pre rectal bleeding  post poor prep  pcp Del Shetty MD    COLONOSCOPY W/ POLYPECTOMY  03/04/2014    Hyperplastic polyp    CORONARY ARTERY BYPASS GRAFT  10/2015    ENDOSCOPY  04/13/2011     Gastritis with hemorrhage    ENDOSCOPY N/A 2017    Normal exam    ENDOSCOPY N/A 2019    Gastritis    ENDOSCOPY N/A 2020    Non-erosive gastritis with hemorrhage    ENDOSCOPY N/A 02/10/2021    Esophagitis    ENDOSCOPY N/A 2024    There were esophageal mucosal changes suspicious for short-segment Low's esophagus present in the distal esophagus. The maximum longitudinal extent of these mucosal changes was 2 cm in length. Mucosa was biopsied with a cold forceps for histologyDistal esophagus, biopsies: Mild chronic active esophagogastritis. No evidence of intestinal metaplasia, dysplasia. Antrum, bx, Mild chronic gastritis    FOOT SURGERY Left     INCISION AND DRAINAGE OF WOUND Left 2007    spider bite       Family History  Family History   Problem Relation Age of Onset    Colon cancer Father     Heart disease Father     Colon cancer Sister     Colon polyps Sister     Alzheimer's disease Mother     Coronary artery disease Sister     Coronary artery disease Sister        Social History  Social History     Socioeconomic History    Marital status:    Tobacco Use    Smoking status: Former     Current packs/day: 0.00     Types: Cigarettes     Quit date:      Years since quittin.6    Smokeless tobacco: Never    Tobacco comments:     [place]   Vaping Use    Vaping status: Never Used   Substance and Sexual Activity    Alcohol use: No    Drug use: No    Sexual activity: Defer         Review of Systems:  History obtained from chart review and the patient  General ROS: No fever or chills  Respiratory ROS: No cough, shortness of breath, wheezing  Cardiovascular ROS: no chest pain or dyspnea on exertion  Gastrointestinal ROS: No abdominal pain or melena  Genito-Urinary ROS: No dysuria or hematuria  14 point ROS reviewed with the patient and negative except as noted above and in the HPI unless unable to obtain.    Objective:  /75   Pulse 72   Temp 96.4 °F (35.8 °C)  "(Axillary)   Resp 16   Ht 182.9 cm (72\")   Wt 130 kg (286 lb 13.1 oz)   SpO2 97%   BMI 38.90 kg/m²     Intake/Output Summary (Last 24 hours) at 8/1/2024 1121  Last data filed at 8/1/2024 0500  Gross per 24 hour   Intake 1549.9 ml   Output --   Net 1549.9 ml     General: awake/alert x 2  HEENT: Normocephalic atraumatic. Head  Neck: Supple with no JVD or carotid bruits.  Chest:  clear to auscultation bilaterally without respiratory distress  CVS: regular rate and rhythm  Abdominal: soft, nontender, normal bowel sounds  Extremities:  Large ulcer with necrotic areas of skin at plantar aspect of left foot  Skin: warm and dry without rash      Labs:    Results from last 7 days   Lab Units 08/01/24  0419 07/31/24  1849   WBC 10*3/mm3 12.83* 15.48*   HEMOGLOBIN g/dL 9.0* 9.8*   HEMATOCRIT % 28.3* 29.6*   PLATELETS 10*3/mm3 176 201       Results from last 7 days   Lab Units 08/01/24  0419 08/01/24  0210 08/01/24  0021 07/31/24  2030 07/31/24  1849   SODIUM mmol/L 138 136 138   < > 132*   POTASSIUM mmol/L 4.1 4.0 4.1   < > 4.5   CHLORIDE mmol/L 103 103 103   < > 97*   CO2 mmol/L 21.0* 19.0* 21.0*   < > 19.0*   BUN mg/dL 50* 51* 52*   < > 55*   CREATININE mg/dL 2.28* 2.39* 2.46*   < > 2.67*   CALCIUM mg/dL 8.8 8.7 8.8   < > 9.0   BILIRUBIN mg/dL  --   --   --   --  0.6   ALK PHOS U/L  --   --   --   --  101   ALT (SGPT) U/L  --   --   --   --  8   AST (SGOT) U/L  --   --   --   --  11   GLUCOSE mg/dL 370* 379* 370*   < > 704*   EGFR mL/min/1.73 30.5* 28.8* 27.8*   < > 25.2*    < > = values in this interval not displayed.         Radiology:   Imaging Results (Last 24 Hours)       Procedure Component Value Units Date/Time    CT Abdomen Pelvis With Contrast [[id number]] Collected: 08/01/24 0543     Updated: 08/01/24 0754    Narrative:      EXAMINATION: CT ABDOMEN PELVIS W CONTRAST-      7/31/2024 10:41 PM     HISTORY: abdominal distention with pain; M86.9-Osteomyelitis,  unspecified; E11.10-Type 2 diabetes mellitus with " ketoacidosis without  coma; R79.89-Other specified abnormal findings of blood chemistry;  I48.91-Unspecified atrial fibrillation; R41.0-Disorientation,  unspecified     In order to have a CT radiation dose as low as reasonably achievable  Automated Exposure Control was utilized for adjustment of the mA and/or  KV according to patient size.     Total DLP = 1841.72 mGy.cm     The CT scan of the abdomen and pelvis is performed after intravenous  contrast enhancement.     The images are acquired in axial plane and subsequent reconstruction in  coronal and sagittal planes.     Comparison is made with the previous study dated 6/27/2024.     The lung bases included in the study show a trace right and small left  basal pleural effusion. There are mild atelectatic changes at bilateral  bases left more than the right.     Limited visualized cardiomediastinal structures show atheromatous  changes of the coronary arteries. There is moderate cardiomegaly.     The liver and spleen are normal.     The gallbladder is surgically absent.     Fatty infiltrated pancreas seen. No focal abnormality. No ductal  dilatation. The adrenal glands are normal.     There is persistent bilateral significant perinephric fat infiltration  and thickening of the pararenal fascia which is similar to the previous  study. Bilateral nephrogram is normal and symmetrical. No calculi. No  hydronephrosis. There is a well-defined sharply marginated low density  exophytic mass from the lower pole of the right kidney measuring 4.4 cm  in diameter. CT density suggest a cyst. Limited visualized ureters are  normal and nondilated. The urinary bladder is well distended. No  intrinsic abnormality.     Prostate is not significantly enlarged.     There are small fat-containing inguinal hernias, right larger than the  left.     There is subcutaneous fat infiltration of the entire abdominal wall  extending into the lower extremity. This may represent a  fluid  overload?.     The stomach is decompressed with moderate wall thickening. No focal  abnormality Alamast. Duodenum is normal. Small bowel is nondistended and  nondilated. Appendix is surgically absent. There is significant large  volume stool throughout the colon. No finding to suggest obstruction.     Atheromatous changes of the abdominal aorta and iliac arteries. No  aneurysmal dilatation.     Moderately prominent nonspecific retroperitoneal para-aortic lymph nodes  predominantly in the mid abdomen. A referenced left para iliac lymph  node, image #57 in series 2 and image #40 and series 3, measures 1.6 cm  in short axis. There is a large lymph node in the left lower anterior  pelvis/external iliac group measuring 1.3 cm in short axis. There are  moderately prominent inguinal lymph nodes. A left inguinal lymph node  measures 2 cm in short axis.     Images reviewed in bone window show chronic degenerative changes of the  lumbar spine. No acute bony abnormality.       Impression:      1. A significant perinephric fat stranding is similar to the previous  study and is a nonspecific finding. Possibility for chronic inflammatory  process/chronic pyelonephritis may not be excluded. Renal functions are  normal and symmetrical.  2. Fatty infiltration of the pancreas. No mass. No ductal dilatation.  3. Nonspecific abdominal, pelvic and inguinal lymphadenopathy. The  etiology and clinical significance is not certain. This appears  moderately more progressive since the previous study.  4. Diffuse subcutaneous fat infiltration of the abdominal wall extending  into the lower extremity may represent fluid overload?.  5. A significant large volume of stool in the colon without evidence of  obstruction. This may represent constipation.     The above study was initially reviewed and reported by [facility]. I do not  find any discrepancies.                                This report was signed and finalized on 8/1/2024 7:50 AM by  Dr. Carlos Cutler MD.       CT Head Without Contrast [[id number]] Collected: 08/01/24 0524     Updated: 08/01/24 0531    Narrative:      EXAMINATION: CT HEAD WO CONTRAST-      7/31/2024 10:41 PM     HISTORY: altered mental status; M86.9-Osteomyelitis, unspecified;  E11.10-Type 2 diabetes mellitus with ketoacidosis without coma;  R79.89-Other specified abnormal findings of blood chemistry;  I48.91-Unspecified atrial fibrillation; R41.0-Disorientation,  unspecified     In order to have a CT radiation dose as low as reasonably achievable  Automated Exposure Control was utilized for adjustment of the mA and/or  KV according to patient size.     Total DLP = 783.72 mGy.cm     The CT scan of the head is performed without intravenous contrast  enhancement.     The images are acquired in axial plane and subsequent reconstruction  with coronal and sagittal planes.     Comparison is made with the previous study dated 5/3/2024.     There is no evidence of a mass. There is no midline shift.     There is no evidence of intracranial hemorrhage or hematoma.     Moderately dilated ventricles, basal cisterns and the cortical sulci are  similar to the previous study representing chronic volume loss.     Areas of chronic white matter ischemia bilaterally are noted. The  gray-white matter differentiation is maintained.     Posterior fossa structures are normal.     The images reviewed in bone window show no acute displaced skull  fracture. A subtle nondisplaced fracture or lesion may be obscured due  to motion artifacts. Large mucous retention cyst is seen in the right  maxillary antrum. The remaining paranasal sinuses and mastoid air cells  are clear.       Impression:      1. No acute intracranial abnormality.  2. Chronic ischemic and atrophic changes.  3. Chronic maxillary sinusitis.     The above study was initially reviewed and reported by [facility]. I do not  find any discrepancies.                                      This  report was signed and finalized on 8/1/2024 5:27 AM by Dr. Carlos Cutler MD.       XR Foot 3+ View Left [[id number]] Collected: 07/31/24 1941     Updated: 07/31/24 1950    Narrative:      EXAMINATION:  XR FOOT 3+ VW LEFT-  7/31/2024 6:22 PM     HISTORY: Heel wound.     COMPARISON: 3/26/2024.     TECHNIQUE: 3 views were obtained.     FINDINGS: There is a deep ulcer on the sole of the foot posteriorly. The  ulcer appears to be packed with some type of radiopaque material. On the  lateral image, there may be a small area of bony erosion involving the  calcaneus just posterior to the plantar calcaneal spur. A small area of  osteomyelitis is not ruled out. There has been prior amputation of the  fifth toe and distal fifth metatarsal. There may have been prior  resection of the distal fourth metacarpal and a portion of the proximal  phalanx of the fourth toe versus chronic erosive change. The appearance  is stable. There is severe narrowing of the first MTP joint. There is  narrowing of some of the interphalangeal joints. There is some spurring  in the tarsal region and at the tarsal-metatarsal junction. There is  soft tissue swelling of the foot diffusely. There is a small curvilinear  foreign body in the soft tissues along the sole of the foot in the  second toe region in the proximal phalanx area. There is another small  linear foreign body in the soft tissues adjacent to the distal phalanx  of the great toe.          Impression:      1. Deep ulcer on the sole of the foot posteriorly near the calcaneus.  There is a questionable small area of bony erosion along the plantar  surface of the calcaneus just proximal to the plantar calcaneal spur.  Osteomyelitis cannot be ruled out. The soft tissue ulcer is packed with  some type of radiopaque material.  2. Linear foreign bodies projected over the soft tissues of the second  toe and first toe. Artifacts are also included in the differential.  3. Other chronic changes, as  "discussed.        This report was signed and finalized on 7/31/2024 7:47 PM by Dr. Raz Mendes MD.       XR Chest 1 View [[id number]] Collected: 07/31/24 1940     Updated: 07/31/24 1944    Narrative:      EXAMINATION:  XR CHEST 1 VW-  7/31/2024 6:22 PM     HISTORY: Altered mental status. Hypertension and diabetes.     COMPARISON: 6/28/2024.     TECHNIQUE: Single view AP image.     FINDINGS: There is hypoventilation with vascular crowding. There is mild  bronchial wall thickening, stable. There is no dense infiltrate or  effusion. Heart size is borderline. Prior heart bypass surgery. Prior  cervical fusion. No definite acute bony abnormality.          Impression:      1. Hypoventilation with vascular crowding.  2. Mild bronchial wall thickening, stable.           This report was signed and finalized on 7/31/2024 7:41 PM by Dr. Raz Mendes MD.               Culture:  No components found for: \"WOUNDCUL\", \"3\"  No components found for: \"CSFCUL\", \"3\"  No components found for: \"BC\", \"3\"  No components found for: \"URINECUL\", \"3\"      Assessment   1.  Acute kidney injury/worsening.  2.  Acute tubular necrosis.  3.  Stage IIIb chronic kidney disease baseline.  4.  Type II diabetic nephropathy.  5.  Sepsis related to diabetic foot ulcer.  6.  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  Morbid abdominal obesity.  8.  Anemia of chronic kidney disease.  9.  Poorly controlled type 2 diabetes.    Plan:  1.  Continue IV fluid.  2.  Urinary electrolytes/UACR.  3.  Close monitoring of Vanco level  4.  Continue to monitor renal function.  5.  Baseline iron studies, may need RYANNE.  Also get serum PTH level.  6.  Plan was discussed with the patient and critical care nurse    Thank you for the consult, we appreciate the opportunity to provide care to your patients.  Feel free to contact me if I can be of any further assistance.      Brian Lomas MD  8/1/2024  11:21 CDT  "

## 2024-08-02 PROBLEM — I48.91 ATRIAL FIBRILLATION: Status: ACTIVE | Noted: 2024-08-02

## 2024-08-02 PROBLEM — L97.529 DIABETIC ULCER OF LEFT FOOT ASSOCIATED WITH TYPE 2 DIABETES MELLITUS: Status: ACTIVE | Noted: 2020-08-31

## 2024-08-02 LAB
ANION GAP SERPL CALCULATED.3IONS-SCNC: 14 MMOL/L (ref 5–15)
BACTERIA SPEC AEROBE CULT: ABNORMAL
BASOPHILS # BLD AUTO: 0.06 10*3/MM3 (ref 0–0.2)
BASOPHILS NFR BLD AUTO: 0.6 % (ref 0–1.5)
BUN SERPL-MCNC: 37 MG/DL (ref 8–23)
BUN/CREAT SERPL: 18.7 (ref 7–25)
CALCIUM SPEC-SCNC: 8.5 MG/DL (ref 8.6–10.5)
CHLORIDE SERPL-SCNC: 108 MMOL/L (ref 98–107)
CO2 SERPL-SCNC: 19 MMOL/L (ref 22–29)
CREAT SERPL-MCNC: 1.98 MG/DL (ref 0.76–1.27)
CREAT UR-MCNC: 59.6 MG/DL
DEPRECATED RDW RBC AUTO: 45.6 FL (ref 37–54)
EGFRCR SERPLBLD CKD-EPI 2021: 36.1 ML/MIN/1.73
EOSINOPHIL # BLD AUTO: 0.61 10*3/MM3 (ref 0–0.4)
EOSINOPHIL NFR BLD AUTO: 6.3 % (ref 0.3–6.2)
EOSINOPHIL SPEC QL MICRO: 0 % EOS/100 CELLS (ref 0–0)
ERYTHROCYTE [DISTWIDTH] IN BLOOD BY AUTOMATED COUNT: 14.6 % (ref 12.3–15.4)
FERRITIN SERPL-MCNC: 245.9 NG/ML (ref 30–400)
GLUCOSE BLDC GLUCOMTR-MCNC: 160 MG/DL (ref 70–130)
GLUCOSE BLDC GLUCOMTR-MCNC: 166 MG/DL (ref 70–130)
GLUCOSE BLDC GLUCOMTR-MCNC: 179 MG/DL (ref 70–130)
GLUCOSE BLDC GLUCOMTR-MCNC: 189 MG/DL (ref 70–130)
GLUCOSE SERPL-MCNC: 157 MG/DL (ref 65–99)
GRAM STN SPEC: ABNORMAL
HCT VFR BLD AUTO: 31.8 % (ref 37.5–51)
HGB BLD-MCNC: 10 G/DL (ref 13–17.7)
IMM GRANULOCYTES # BLD AUTO: 0.03 10*3/MM3 (ref 0–0.05)
IMM GRANULOCYTES NFR BLD AUTO: 0.3 % (ref 0–0.5)
IRON 24H UR-MRATE: 18 MCG/DL (ref 59–158)
IRON SATN MFR SERPL: 7 % (ref 20–50)
ISOLATED FROM: ABNORMAL
LYMPHOCYTES # BLD AUTO: 1.24 10*3/MM3 (ref 0.7–3.1)
LYMPHOCYTES NFR BLD AUTO: 12.7 % (ref 19.6–45.3)
MCH RBC QN AUTO: 26.7 PG (ref 26.6–33)
MCHC RBC AUTO-ENTMCNC: 31.4 G/DL (ref 31.5–35.7)
MCV RBC AUTO: 85 FL (ref 79–97)
MONOCYTES # BLD AUTO: 0.83 10*3/MM3 (ref 0.1–0.9)
MONOCYTES NFR BLD AUTO: 8.5 % (ref 5–12)
NEUTROPHILS NFR BLD AUTO: 6.99 10*3/MM3 (ref 1.7–7)
NEUTROPHILS NFR BLD AUTO: 71.6 % (ref 42.7–76)
NRBC BLD AUTO-RTO: 0 /100 WBC (ref 0–0.2)
PLATELET # BLD AUTO: 219 10*3/MM3 (ref 140–450)
PMV BLD AUTO: 12.7 FL (ref 6–12)
POTASSIUM SERPL-SCNC: 3.8 MMOL/L (ref 3.5–5.2)
RBC # BLD AUTO: 3.74 10*6/MM3 (ref 4.14–5.8)
SODIUM SERPL-SCNC: 141 MMOL/L (ref 136–145)
TIBC SERPL-MCNC: 256 MCG/DL (ref 298–536)
TRANSFERRIN SERPL-MCNC: 172 MG/DL (ref 200–360)
WBC NRBC COR # BLD AUTO: 9.76 10*3/MM3 (ref 3.4–10.8)

## 2024-08-02 PROCEDURE — 80048 BASIC METABOLIC PNL TOTAL CA: CPT

## 2024-08-02 PROCEDURE — 25810000003 SODIUM CHLORIDE 0.9 % SOLUTION 250 ML FLEX CONT: Performed by: INTERNAL MEDICINE

## 2024-08-02 PROCEDURE — 25810000003 SODIUM CHLORIDE 0.9 % SOLUTION: Performed by: INTERNAL MEDICINE

## 2024-08-02 PROCEDURE — 82948 REAGENT STRIP/BLOOD GLUCOSE: CPT

## 2024-08-02 PROCEDURE — 87147 CULTURE TYPE IMMUNOLOGIC: CPT | Performed by: INTERNAL MEDICINE

## 2024-08-02 PROCEDURE — 87147 CULTURE TYPE IMMUNOLOGIC: CPT | Performed by: NURSE PRACTITIONER

## 2024-08-02 PROCEDURE — 99232 SBSQ HOSP IP/OBS MODERATE 35: CPT | Performed by: INTERNAL MEDICINE

## 2024-08-02 PROCEDURE — 25010000002 NA FERRIC GLUC CPLX PER 12.5 MG: Performed by: INTERNAL MEDICINE

## 2024-08-02 PROCEDURE — 87205 SMEAR GRAM STAIN: CPT | Performed by: INTERNAL MEDICINE

## 2024-08-02 PROCEDURE — 25010000002 MORPHINE PER 10 MG

## 2024-08-02 PROCEDURE — 25010000002 VANCOMYCIN 1 G RECONSTITUTED SOLUTION 1 EACH VIAL: Performed by: INTERNAL MEDICINE

## 2024-08-02 PROCEDURE — 85025 COMPLETE CBC W/AUTO DIFF WBC: CPT

## 2024-08-02 PROCEDURE — 99231 SBSQ HOSP IP/OBS SF/LOW 25: CPT

## 2024-08-02 PROCEDURE — 87070 CULTURE OTHR SPECIMN AEROBIC: CPT | Performed by: NURSE PRACTITIONER

## 2024-08-02 PROCEDURE — 82728 ASSAY OF FERRITIN: CPT | Performed by: INTERNAL MEDICINE

## 2024-08-02 PROCEDURE — 25010000002 ENOXAPARIN PER 10 MG: Performed by: INTERNAL MEDICINE

## 2024-08-02 PROCEDURE — 25010000002 PIPERACILLIN SOD-TAZOBACTAM PER 1 G

## 2024-08-02 PROCEDURE — 87205 SMEAR GRAM STAIN: CPT | Performed by: NURSE PRACTITIONER

## 2024-08-02 PROCEDURE — 63710000001 INSULIN GLARGINE PER 5 UNITS: Performed by: INTERNAL MEDICINE

## 2024-08-02 PROCEDURE — 83540 ASSAY OF IRON: CPT | Performed by: INTERNAL MEDICINE

## 2024-08-02 PROCEDURE — 63710000001 INSULIN REGULAR HUMAN PER 5 UNITS: Performed by: INTERNAL MEDICINE

## 2024-08-02 PROCEDURE — 87070 CULTURE OTHR SPECIMN AEROBIC: CPT | Performed by: INTERNAL MEDICINE

## 2024-08-02 PROCEDURE — 84466 ASSAY OF TRANSFERRIN: CPT | Performed by: INTERNAL MEDICINE

## 2024-08-02 RX ORDER — FAMOTIDINE 20 MG/1
20 TABLET, FILM COATED ORAL EVERY 12 HOURS SCHEDULED
Status: DISCONTINUED | OUTPATIENT
Start: 2024-08-02 | End: 2024-08-12 | Stop reason: HOSPADM

## 2024-08-02 RX ORDER — ROSUVASTATIN CALCIUM 10 MG/1
10 TABLET, COATED ORAL NIGHTLY
Status: DISCONTINUED | OUTPATIENT
Start: 2024-08-02 | End: 2024-08-12 | Stop reason: HOSPADM

## 2024-08-02 RX ORDER — DULOXETIN HYDROCHLORIDE 30 MG/1
60 CAPSULE, DELAYED RELEASE ORAL DAILY
Status: DISCONTINUED | OUTPATIENT
Start: 2024-08-02 | End: 2024-08-12 | Stop reason: HOSPADM

## 2024-08-02 RX ORDER — SODIUM BICARBONATE 650 MG/1
650 TABLET ORAL 3 TIMES DAILY
Status: DISCONTINUED | OUTPATIENT
Start: 2024-08-02 | End: 2024-08-12 | Stop reason: HOSPADM

## 2024-08-02 RX ORDER — PANTOPRAZOLE SODIUM 40 MG/1
40 TABLET, DELAYED RELEASE ORAL EVERY 12 HOURS SCHEDULED
Status: DISCONTINUED | OUTPATIENT
Start: 2024-08-02 | End: 2024-08-12 | Stop reason: HOSPADM

## 2024-08-02 RX ORDER — DONEPEZIL HYDROCHLORIDE 10 MG/1
10 TABLET, FILM COATED ORAL DAILY
Status: DISCONTINUED | OUTPATIENT
Start: 2024-08-02 | End: 2024-08-12 | Stop reason: HOSPADM

## 2024-08-02 RX ORDER — POLYETHYLENE GLYCOL 3350 17 G/17G
17 POWDER, FOR SOLUTION ORAL DAILY
Status: DISCONTINUED | OUTPATIENT
Start: 2024-08-02 | End: 2024-08-12 | Stop reason: HOSPADM

## 2024-08-02 RX ORDER — MORPHINE SULFATE 2 MG/ML
2 INJECTION, SOLUTION INTRAMUSCULAR; INTRAVENOUS ONCE
Status: COMPLETED | OUTPATIENT
Start: 2024-08-02 | End: 2024-08-02

## 2024-08-02 RX ORDER — ASCORBIC ACID 500 MG
1000 TABLET ORAL DAILY
Status: DISCONTINUED | OUTPATIENT
Start: 2024-08-02 | End: 2024-08-12 | Stop reason: HOSPADM

## 2024-08-02 RX ADMIN — OXYCODONE HYDROCHLORIDE AND ACETAMINOPHEN 1000 MG: 500 TABLET ORAL at 11:25

## 2024-08-02 RX ADMIN — SODIUM BICARBONATE 650 MG: 650 TABLET ORAL at 17:02

## 2024-08-02 RX ADMIN — PREGABALIN 100 MG: 100 CAPSULE ORAL at 20:53

## 2024-08-02 RX ADMIN — CHLORHEXIDINE GLUCONATE 1 APPLICATION: 500 CLOTH TOPICAL at 04:31

## 2024-08-02 RX ADMIN — SODIUM CHLORIDE 75 ML/HR: 9 INJECTION, SOLUTION INTRAVENOUS at 01:54

## 2024-08-02 RX ADMIN — PANTOPRAZOLE SODIUM 40 MG: 40 TABLET, DELAYED RELEASE ORAL at 11:09

## 2024-08-02 RX ADMIN — PREGABALIN 50 MG: 25 CAPSULE ORAL at 09:40

## 2024-08-02 RX ADMIN — SODIUM HYPOCHLORITE: 1.25 SOLUTION TOPICAL at 20:49

## 2024-08-02 RX ADMIN — BISACODYL 10 MG: 10 SUPPOSITORY RECTAL at 09:41

## 2024-08-02 RX ADMIN — OXYCODONE HYDROCHLORIDE 10 MG: 5 TABLET ORAL at 09:32

## 2024-08-02 RX ADMIN — MORPHINE SULFATE 2 MG: 2 INJECTION, SOLUTION INTRAMUSCULAR; INTRAVENOUS at 04:30

## 2024-08-02 RX ADMIN — SODIUM HYPOCHLORITE: 1.25 SOLUTION TOPICAL at 09:41

## 2024-08-02 RX ADMIN — INSULIN GLARGINE 10 UNITS: 100 INJECTION, SOLUTION SUBCUTANEOUS at 09:32

## 2024-08-02 RX ADMIN — DOCUSATE SODIUM 50 MG AND SENNOSIDES 8.6 MG 2 TABLET: 8.6; 5 TABLET, FILM COATED ORAL at 09:41

## 2024-08-02 RX ADMIN — SODIUM CHLORIDE 250 MG: 9 INJECTION, SOLUTION INTRAVENOUS at 13:02

## 2024-08-02 RX ADMIN — Medication 10 ML: at 09:33

## 2024-08-02 RX ADMIN — INSULIN HUMAN 3 UNITS: 100 INJECTION, SOLUTION PARENTERAL at 17:02

## 2024-08-02 RX ADMIN — Medication 10 ML: at 20:47

## 2024-08-02 RX ADMIN — SODIUM BICARBONATE 650 MG: 650 TABLET ORAL at 20:51

## 2024-08-02 RX ADMIN — PIPERACILLIN SODIUM AND TAZOBACTAM SODIUM 3.38 G: 3; .375 INJECTION, POWDER, LYOPHILIZED, FOR SOLUTION INTRAVENOUS at 02:10

## 2024-08-02 RX ADMIN — INSULIN HUMAN 3 UNITS: 100 INJECTION, SOLUTION PARENTERAL at 21:04

## 2024-08-02 RX ADMIN — PANTOPRAZOLE SODIUM 40 MG: 40 TABLET, DELAYED RELEASE ORAL at 20:50

## 2024-08-02 RX ADMIN — SODIUM CHLORIDE 50 ML/HR: 9 INJECTION, SOLUTION INTRAVENOUS at 17:02

## 2024-08-02 RX ADMIN — Medication 2.5 MG: at 21:03

## 2024-08-02 RX ADMIN — DULOXETINE HYDROCHLORIDE 60 MG: 30 CAPSULE, DELAYED RELEASE ORAL at 11:25

## 2024-08-02 RX ADMIN — ENOXAPARIN SODIUM 135 MG: 150 INJECTION SUBCUTANEOUS at 09:33

## 2024-08-02 RX ADMIN — FAMOTIDINE 20 MG: 20 TABLET ORAL at 11:09

## 2024-08-02 RX ADMIN — OXYCODONE HYDROCHLORIDE 10 MG: 5 TABLET ORAL at 20:53

## 2024-08-02 RX ADMIN — POLYETHYLENE GLYCOL 3350 17 G: 17 POWDER, FOR SOLUTION ORAL at 11:09

## 2024-08-02 RX ADMIN — INSULIN HUMAN 3 UNITS: 100 INJECTION, SOLUTION PARENTERAL at 11:34

## 2024-08-02 RX ADMIN — INSULIN HUMAN 3 UNITS: 100 INJECTION, SOLUTION PARENTERAL at 07:52

## 2024-08-02 RX ADMIN — FAMOTIDINE 20 MG: 20 TABLET ORAL at 20:51

## 2024-08-02 RX ADMIN — VANCOMYCIN HYDROCHLORIDE 1000 MG: 1 INJECTION, POWDER, LYOPHILIZED, FOR SOLUTION INTRAVENOUS at 20:50

## 2024-08-02 RX ADMIN — PIPERACILLIN SODIUM AND TAZOBACTAM SODIUM 3.38 G: 3; .375 INJECTION, POWDER, LYOPHILIZED, FOR SOLUTION INTRAVENOUS at 09:31

## 2024-08-02 RX ADMIN — Medication 1 APPLICATION: at 09:32

## 2024-08-02 RX ADMIN — DONEPEZIL HYDROCHLORIDE 10 MG: 10 TABLET, FILM COATED ORAL at 13:19

## 2024-08-02 RX ADMIN — ROSUVASTATIN CALCIUM 10 MG: 10 TABLET, FILM COATED ORAL at 20:51

## 2024-08-02 RX ADMIN — ENOXAPARIN SODIUM 135 MG: 150 INJECTION SUBCUTANEOUS at 20:54

## 2024-08-02 NOTE — PAYOR COMM NOTE
"8/1 CLINICAL  [id number]    [phone]    Erick Luong (69 y.o. Male)       Date of Birth   1955    Social Security Number       Address   [address] [place] [address]    Home Phone   [phone]    MRN   [id number]       [facility]    Marital Status                               Admission Date   7/31/24    Admission Type   Emergency    Admitting Provider   Keren Jamison MD    Attending Provider   Keren Jamison MD    Department, Room/Bed   [facility] CARDIAC CARE, C004/1       Discharge Date       Discharge Disposition       Discharge Destination                                 Attending Provider: Keren Jamison MD    Allergies: Cefepime, Bactrim [Sulfamethoxazole-trimethoprim], Vancomycin, Zolpidem, Metronidazole    Isolation: None   Infection: MRSA (05/19/19), COVID (History) (08/08/22), ESBL E coli (06/30/24)   Code Status: CPR    Ht: 182.9 cm (72\")   Wt: 130 kg (286 lb 13.1 oz)    Admission Cmt: None   Principal Problem: DKA, type 2, not at goal [E11.10]                   Active Insurance as of 7/31/2024       Primary Coverage       Payor Plan Insurance Group Employer/Plan Group    [employer] [facility] EM[employer] [id number]       Payor Plan Address Payor Plan Phone Number Payor Plan Fax Number Effective Dates    [address] [phone]  1/1/2022 - None Entered    [facility] [id number]         Subscriber Name Subscriber Birth Date Member ID       ZAINAB LUONG 11/27/1969 [id number]               Secondary Coverage       Payor Plan Insurance Group Employer/Plan Group    MEDICARE MEDICARE A & B        Payor Plan Address Payor Plan Phone Number Payor Plan Fax Number Effective Dates    [address] [phone]  7/1/2013 - None Entered    [facility] [id number]         Subscriber Name Subscriber Birth Date Member ID       ERICK LUONG 1955 [id number]                     Emergency Contacts        (Rel.) Home Phone Work " Phone Mobile Phone    Joan Luong (Spouse) [phone]              Current Facility-Administered Medications   Medication Dose Route Frequency Provider Last Rate Last Admin    sennosides-docusate (PERICOLACE) 8.6-50 MG per tablet 2 tablet  2 tablet Oral BID Lamine Figueroa APRN   2 tablet at 08/01/24 2048    And    polyethylene glycol (MIRALAX) packet 17 g  17 g Oral Daily PRN Lamine Figueroa APRN        And    bisacodyl (DULCOLAX) EC tablet 5 mg  5 mg Oral Daily PRN Lamine Figueroa APRN        And    bisacodyl (DULCOLAX) suppository 10 mg  10 mg Rectal Daily PRN Lamine Figueroa APRN        Calcium Replacement - Follow Nurse / BPA Driven Protocol   Does not apply PRN Abebe Garzon DO        Chlorhexidine Gluconate Cloth 2 % pads 1 Application  1 Application Topical Q24H Lamine Figueroa APRN   1 Application at 08/02/24 0431    dextrose (D50W) (25 g/50 mL) IV injection 10-50 mL  10-50 mL Intravenous Q15 Min PRN Abebe Garzon DO        dextrose (D50W) (25 g/50 mL) IV injection 25 g  25 g Intravenous Q15 Min PRN Lamien Figueroa APRN        dextrose (GLUTOSE) oral gel 15 g  15 g Oral Q15 Min PRN Lamine Figueroa APRN        Enoxaparin Sodium (LOVENOX) syringe 135 mg  1 mg/kg Subcutaneous Q12H Abebe Garzon DO   135 mg at 08/01/24 2053    glucagon (GLUCAGEN) injection 1 mg  1 mg Intramuscular Q15 Min PRN Lamine Figueroa APRN        insulin glargine (LANTUS, SEMGLEE) injection 10 Units  10 Units Subcutaneous Daily Keren Jamison MD   10 Units at 08/01/24 0954    insulin regular (humuLIN R,novoLIN R) injection 3-14 Units  3-14 Units Subcutaneous 4x Daily AC & at Bedtime Keren Jamison MD   3 Units at 08/01/24 1713    Magnesium Standard Dose Replacement - Follow Nurse / BPA Driven Protocol   Does not apply PRN Abebe Garzon DO        mupirocin (BACTROBAN) 2 % nasal ointment 1 Application  1 Application Each Nare BID Lamine Figueroa, SUSAN   1 Application at  08/01/24 2048    nitroglycerin (NITROSTAT) SL tablet 0.4 mg  0.4 mg Sublingual Q5 Min PRN Lamine Figueroa APRN        oxyCODONE (ROXICODONE) immediate release tablet 10 mg  10 mg Oral BID Lamine Figueroa APRN   10 mg at 08/01/24 2048    Pharmacy to Dose enoxaparin (LOVENOX)   Does not apply Continuous PRN Abebe Garzon DO        Pharmacy to Dose Zosyn   Does not apply Continuous PRN Lamine Figueroa APRN        Phosphorus Replacement - Follow Nurse / BPA Driven Protocol   Does not apply PRN Abebe Garzon DO        piperacillin-tazobactam (ZOSYN) 3.375 g IVPB in 100 mL NS MBP (CD)  3.375 g Intravenous Q8H Lamine Figueroa APRN   3.375 g at 08/02/24 0210    Potassium Replacement - Follow Nurse / BPA Driven Protocol   Does not apply PRN Abebe Garzon DO        pregabalin (LYRICA) capsule 100 mg  100 mg Oral Nightly Reuben Garza APRN        pregabalin (LYRICA) capsule 50 mg  50 mg Oral Daily Reuben Garza APRN        sodium chloride 0.9 % flush 10 mL  10 mL Intravenous Q12H Abebe Garzon DO   10 mL at 08/01/24 2048    sodium chloride 0.9 % flush 10 mL  10 mL Intravenous PRN Abebe Garzon DO        sodium chloride 0.9 % flush 10 mL  10 mL Intravenous Q12H Lamine Figueroa APRN   10 mL at 08/01/24 2048    sodium chloride 0.9 % flush 10 mL  10 mL Intravenous PRN Lamine Figueroa APRN        sodium chloride 0.9 % infusion 40 mL  40 mL Intravenous PRN Abebe Garzon DO        sodium chloride 0.9 % infusion 40 mL  40 mL Intravenous PRN Lamine Figueroa APRN        sodium chloride 0.9 % infusion  75 mL/hr Intravenous Continuous Brian Lomas MD 75 mL/hr at 08/02/24 0154 75 mL/hr at 08/02/24 0154    sodium hypochlorite (DAKIN'S 1/4 STRENGTH) 0.125 % topical solution 0.125% solution   Topical Q12H Aby High APRN   Given at 08/01/24 2048    vancomycin 750 mg/250 mL 0.9% NS IVPB ([facility])  750 mg Intravenous Q24H Lamine Figueroa APRN   750 mg at 08/01/24 2054     Orders (last 24  "hrs)        Start     Ordered    08/03/24 2000  Vancomycin, Trough Please draw 30-60 minutes prior to 2100 dose.  Timed        Comments: Please draw 30-60 minutes prior to 2100 dose.      08/01/24 0107    08/02/24 2100  pregabalin (LYRICA) capsule 100 mg  Nightly         08/01/24 1340    08/02/24 0900  pregabalin (LYRICA) capsule 50 mg  Daily         08/01/24 1340    08/02/24 0600  CBC Auto Differential  PROCEDURE ONCE         08/01/24 2201 08/02/24 0515  Morphine sulfate (PF) injection 2 mg  Once         08/02/24 0423    08/02/24 0400  Chlorhexidine Gluconate Cloth 2 % pads 1 Application  Every 24 Hours         08/01/24 0053    08/01/24 2100  vancomycin 750 mg/250 mL 0.9% NS IVPB ([facility])  Every 24 Hours,   Status:  Discontinued        Placed in \"Followed by\" Linked Group    07/31/24 2028 08/01/24 2100  vancomycin 750 mg/250 mL 0.9% NS IVPB ([facility])  Every 24 Hours        Placed in \"Followed by\" Linked Group    08/01/24 0105 08/01/24 2100  oxyCODONE (ROXICODONE) immediate release tablet 10 mg  2 Times Daily         08/01/24 1955 08/01/24 2046  POC Glucose Once  PROCEDURE ONCE        Comments: Complete no more than 45 minutes prior to patient eating      08/01/24 2034    08/01/24 1711  POC Glucose Once  PROCEDURE ONCE        Comments: Complete no more than 45 minutes prior to patient eating      08/01/24 1659    08/01/24 1245  sodium chloride 0.9 % infusion  Continuous         08/01/24 1146    08/01/24 1147  PTH, Intact  Once         08/01/24 1146    08/01/24 1146  Sodium, Urine, Random - Urine, Clean Catch  Once         08/01/24 1146    08/01/24 1146  Iron Profile  Once         08/01/24 1146    08/01/24 1146  Eosinophil Smear - Urine, Urine, Clean Catch  Once         08/01/24 1146    08/01/24 1146  Protein, Urine, Random - Urine, Clean Catch  Once         08/01/24 1146    08/01/24 1146  Creatinine Urine Random (kidney function) GFR component - Urine, Clean Catch  Once         08/01/24 1146    08/01/24 1146  " US Renal Bilateral  1 Time Imaging         08/01/24 1146    08/01/24 1135  POC Glucose Once  PROCEDURE ONCE        Comments: Complete no more than 45 minutes prior to patient eating      08/01/24 1123    08/01/24 1130  insulin regular (humuLIN R,novoLIN R) injection 3-14 Units  4 Times Daily Before Meals & Nightly         08/01/24 0851    08/01/24 1030  pregabalin (LYRICA) capsule 100 mg  Once         08/01/24 0931    08/01/24 1016  Inpatient Nephrology Consult  Once        Specialty:  Nephrology  Provider:  Brian Lomas MD    08/01/24 1016    08/01/24 1015  insulin glargine (LANTUS, SEMGLEE) injection 10 Units  Daily         08/01/24 0929    08/01/24 1007  Blood Culture ID, PCR - Blood, Arm, Left  Once        Comments: From a different site than #1.      08/01/24 1006    08/01/24 0945  sodium hypochlorite (DAKIN'S 1/4 STRENGTH) 0.125 % topical solution 0.125% solution  Every 12 Hours Scheduled         08/01/24 0934    08/01/24 0935  Wound Care  Every 12 Hours         08/01/24 0934    08/01/24 0907  Follow Pressure Ulcer Prevention Measures Policy  Continuous        Comments: Implement Appropriate Pressure Ulcer Prevention Measures  - Open Order Report to View Full Instructions  Enter Wound LDA & Document Assessment  Add Wound Care Plan  Add Patient Education Per Policy    08/01/24 0906    08/01/24 0907  Turn Patient  Now Then Every 2 Hours         08/01/24 0906    08/01/24 0907  Elevate Heels Off of Bed  Until Discontinued         08/01/24 0906    08/01/24 0907  Use Seat Cushion When Up In Chair  Continuous         08/01/24 0906    08/01/24 0907  Silicone Border Dressing to Bony Prominences  Per Order Details        Comments: Apply silicone foam border dressing per protocol to sacral spine/bilateral heels for protection.  Nursing to change dressing every 3 days and PRN if soiled. Nursing is to peel back dressing with every assessment to assess skin underneath dressing. No barrier cream under dressing.    08/01/24  "0906    08/01/24 0907  Use Repositioning Wedge to Position Patient  Continuous        Comments: Use Comfort Glide repositioning sheet and wedges to position patient.    08/01/24 0907    08/01/24 0900  sennosides-docusate (PERICOLACE) 8.6-50 MG per tablet 2 tablet  2 Times Daily        Placed in \"And\" Linked Group    08/01/24 0053    08/01/24 0840  Diet: Cardiac, Diabetic; Healthy Heart (2-3 Na+); Consistent Carbohydrate; Fluid Consistency: Thin (IDDSI 0)  Diet Effective Now         08/01/24 0839    08/01/24 0835  POC Glucose Once  PROCEDURE ONCE        Comments: Complete no more than 45 minutes prior to patient eating      08/01/24 0823    08/01/24 0802  XR Foot 2 View Left  1 Time Imaging,   Status:  Canceled         08/01/24 0803    08/01/24 0702  Inpatient Infectious Diseases Consult  IN         Specialty:  Infectious Diseases  Provider:  Julia Adrian MD    08/01/24 0053    08/01/24 0702  Inpatient Wound Care MD Consult Diabetic Ulcer  IN         Specialty:  Wound Care  Provider:  Dunia Wolfe APRN    08/01/24 0053    08/01/24 0702  Inpatient Cardiology Consult  IN         Specialty:  Cardiology  Provider:  Emeka Garay MD    08/01/24 0053    08/01/24 0702  Inpatient Wound Care MD Consult  IN AM        Specialty:  Wound Care  Provider:  Dunia Wolfe APRN    08/01/24 0459    08/01/24 0700  POC Glucose 4x Daily Before Meals & at Bedtime  4 Times Daily Before Meals & at Bedtime      Comments: Complete no more than 45 minutes prior to patient eating      08/01/24 0053    08/01/24 0608  POC Glucose Once  PROCEDURE ONCE        Comments: Complete no more than 45 minutes prior to patient eating      08/01/24 0556    08/01/24 0600  Daily Weights  Daily       07/31/24 1946    08/01/24 0600  Basic Metabolic Panel  Daily       08/01/24 0053    08/01/24 0600  CBC & Differential  Daily       08/01/24 0053    08/01/24 0600  CBC Auto Differential  PROCEDURE ONCE         08/01/24 0053    " "08/01/24 0600  MRSA Screen, PCR (Inpatient) - Swab, Nares  Once         08/01/24 0107 08/01/24 0145  sodium chloride 0.9 % flush 10 mL  Every 12 Hours Scheduled         08/01/24 0053    08/01/24 0145  mupirocin (BACTROBAN) 2 % nasal ointment 1 Application  2 Times Daily         08/01/24 0053    08/01/24 0145  insulin regular (humuLIN R,novoLIN R) injection 3-14 Units  Every 6 Hours Scheduled,   Status:  Discontinued         08/01/24 0053    08/01/24 0130  piperacillin-tazobactam (ZOSYN) 3.375 g IVPB in 100 mL NS MBP (CD)  Every 8 Hours         08/01/24 0101 08/01/24 0100  Vital Signs Every Hour and Per Hospital Policy Based on Patient Condition  Every Hour       08/01/24 0053    08/01/24 0100  Intake & Output  Every Hour       08/01/24 0053    08/01/24 0054  Pharmacy to dose vancomycin  Continuous PRN,   Status:  Discontinued         08/01/24 0055 08/01/24 0054  Daily Weights  Daily       08/01/24 0053    08/01/24 0053  Pharmacy to Dose Zosyn  Continuous PRN         08/01/24 0055    08/01/24 0051  dextrose (GLUTOSE) oral gel 15 g  Every 15 Minutes PRN         08/01/24 0053    08/01/24 0051  dextrose (D50W) (25 g/50 mL) IV injection 25 g  Every 15 Minutes PRN         08/01/24 0053    08/01/24 0051  glucagon (GLUCAGEN) injection 1 mg  Every 15 Minutes PRN         08/01/24 0053    08/01/24 0048  Oral Care - Patient Not on NPPV & Not Intubated  Every Shift       08/01/24 0053 08/01/24 0047  sodium chloride 0.9 % flush 10 mL  As Needed         08/01/24 0053 08/01/24 0047  sodium chloride 0.9 % infusion 40 mL  As Needed         08/01/24 0053 08/01/24 0047  polyethylene glycol (MIRALAX) packet 17 g  Daily PRN        Placed in \"And\" Linked Group    08/01/24 0053    08/01/24 0047  bisacodyl (DULCOLAX) EC tablet 5 mg  Daily PRN        Placed in \"And\" Linked Group    08/01/24 0053    08/01/24 0047  bisacodyl (DULCOLAX) suppository 10 mg  Daily PRN        Placed in \"And\" Linked Group    08/01/24 0053    " 08/01/24 0047  nitroglycerin (NITROSTAT) SL tablet 0.4 mg  Every 5 Minutes PRN         08/01/24 0053    08/01/24 0000  Ambulatory Referral to Wound Clinic         08/01/24 0935    07/31/24 2130  Enoxaparin Sodium (LOVENOX) syringe 135 mg  Every 12 Hours         07/31/24 2111 07/31/24 2107  Pharmacy to Dose enoxaparin (LOVENOX)  Continuous PRN         07/31/24 2107 07/31/24 2100  sodium chloride 0.9 % flush 10 mL  Every 12 Hours Scheduled         07/31/24 1946 07/31/24 2000  Vital Signs  Every Hour       07/31/24 1946 07/31/24 2000  Strict Intake & Output  Every Hour      Comments: While on Insulin Infusion    07/31/24 1946 07/31/24 2000  Basic Metabolic Panel  Every 4 Hours,   Status:  Canceled       07/31/24 1946 07/31/24 2000  Magnesium  Every 4 Hours,   Status:  Canceled       07/31/24 1946 07/31/24 2000  Phosphorus  Every 4 Hours,   Status:  Canceled       07/31/24 1946 07/31/24 1946  sodium chloride 0.45 % 1,000 mL with potassium chloride 40 mEq infusion  Continuous PRN,   Status:  Discontinued         07/31/24 1946 07/31/24 1946  dextrose 5 % and sodium chloride 0.45 % infusion  Continuous PRN,   Status:  Discontinued         07/31/24 1946 07/31/24 1946  dextrose 5 % and sodium chloride 0.45 % with KCl 20 mEq/L infusion  Continuous PRN,   Status:  Discontinued         07/31/24 1946 07/31/24 1946  dextrose 5 % and sodium chloride 0.45 % with KCl 40 mEq/L infusion  Continuous PRN,   Status:  Discontinued         07/31/24 1946 07/31/24 1946  Potassium Replacement - Follow Nurse / BPA Driven Protocol  As Needed         07/31/24 1946 07/31/24 1946  Magnesium Standard Dose Replacement - Follow Nurse / BPA Driven Protocol  As Needed         07/31/24 1946 07/31/24 1946  Phosphorus Replacement - Follow Nurse / BPA Driven Protocol  As Needed         07/31/24 1946 07/31/24 1946  Calcium Replacement - Follow Nurse / BPA Driven Protocol  As Needed         07/31/24 1946     07/31/24 1946  dextrose (D50W) (25 g/50 mL) IV injection 10-50 mL  Every 15 Minutes PRN         07/31/24 1946 07/31/24 1946  sodium chloride 0.9 % infusion  Continuous PRN,   Status:  Discontinued         07/31/24 1946 07/31/24 1946  sodium chloride 0.9 % with KCl 20 mEq/L infusion  Continuous PRN,   Status:  Discontinued         07/31/24 1946 07/31/24 1946  sodium chloride 0.9 % with KCl 40 mEq/L infusion  Continuous PRN,   Status:  Discontinued         07/31/24 1946 07/31/24 1946  dextrose 5 % and sodium chloride 0.9 % infusion  Continuous PRN,   Status:  Discontinued         07/31/24 1946 07/31/24 1946  dextrose 5 % and sodium chloride 0.9 % with KCl 20 mEq/L infusion  Continuous PRN,   Status:  Discontinued         07/31/24 1946 07/31/24 1946  dextrose 5 % and sodium chloride 0.9 % with KCl 40 mEq/L infusion  Continuous PRN,   Status:  Discontinued         07/31/24 1946 07/31/24 1946  sodium chloride 0.45 % infusion  Continuous PRN,   Status:  Discontinued         07/31/24 1946 07/31/24 1946  sodium chloride 0.45 % with KCl 20 mEq/L infusion  Continuous PRN,   Status:  Discontinued         07/31/24 1946 07/31/24 1946  sodium chloride 0.9 % flush 10 mL  As Needed         07/31/24 1946 07/31/24 1946  sodium chloride 0.9 % infusion 40 mL  As Needed         07/31/24 1946 07/31/24 1912  dilTIAZem (CARDIZEM) 125 mg in 125 mL D5W infusion  Titrated,   Status:  Discontinued         07/31/24 1856    Unscheduled  POC Glucose PRN  As Needed      Comments: [facility] Recommended POC Glucose Testing Will Vary Between Every 15 Minutes & Every 2 Hours Release PRN POC Glucose Orders as Needed      07/31/24 1946    Unscheduled  Treat Hypoglycemia As Recommended By [facility]™ & Notify Provider of Treatment  As Needed      Comments: Follow Hypoglycemia Orders As Outlined in Process Instructions (Open Order Report to View Full Instructions)  Notify Provider Any Time Hypoglycemia Treatment is  Administered    07/31/24 1946    Unscheduled  If Insulin Infusion is Paused - Follow [facility] Instructions  As Needed       07/31/24 1946    Unscheduled  Follow Hypoglycemia Standing Orders For Blood Glucose <70 & Notify Provider of Treatment  As Needed      Comments: Follow Hypoglycemia Orders As Outlined in Process Instructions (Open Order Report to View Full Instructions)  Notify Provider Any Time Hypoglycemia Treatment is Administered    08/01/24 0053    Unscheduled  Wound Care  As Needed       08/01/24 0906    --  pregabalin (LYRICA) 100 MG capsule  Every Morning,   Status:  Discontinued         08/01/24 1229    --  empagliflozin (JARDIANCE) 25 MG tablet tablet  Daily,   Status:  Discontinued         08/01/24 1229    --  valsartan (DIOVAN) 40 MG tablet  Daily,   Status:  Discontinued         08/01/24 1229    --  lactulose (CHRONULAC) 10 GM/15ML solution  3 Times Daily,   Status:  Discontinued         08/01/24 1229    --  pregabalin (LYRICA) 100 MG capsule  Daily         08/01/24 1255    --  pregabalin (LYRICA) 100 MG capsule  Every Night at Bedtime         08/01/24 1255                  Ventilator/Non-Invasive Ventilation Settings (From admission, onward)      None             Physician Progress Notes (last 48 hours)        Keren Jamison MD at 08/01/24 1150              [facility] Intensivist Services  Progress Note    Date of Admission: 7/31/2024  Primary Care Physician: Del Shetty MD    Subjective     Chief Complaint: DKA, type 2, not at goal     History of Present Illness    Mr. Luong is a 69-year-old male who presented to [facility] ER via EMS for altered mental status.  It was reported to ER physician by EMS that the patient was was found by his son confused and wandering around in the garage.  Unfortunately family was not available for additional information.   HPI obtained from ER physician, Dr. Dee, who advises that patient presented soiled, confused and unable  to provided events leading to his reason for EMS transport.      ER course workup significant for: CBC with elevated WBC of 15.4, low hemoglobin 9.8 and hematocrit 29.6, elevated ESR 57, elevated CRP of 17.  CMP with low sodium 133, elevated BUN of 54 and elevated creatinine 2.64, elevated glucose 704, urine ketones and small amount of acetone. Elevated lactate 2.4 with delta 2.2.  Elevated HS troponin T 342 delta 353.   Urinalysis: Trace blood, positive nitrite, negative leukocytes, +2 bacteria.   CXR: Low lung volumes and vascular crowding.  X-ray of left foot: Ulcer on the sole of the foot posteriorly near the calcaneus.  There is questionable small area of bony erosion along the plantar surface of the calcaneus just proximal to the plantar calcaneal spur.  Osteomyelitis cannot be ruled out.   EKG: A-fib with rapid ventricular response with rate of 128 bpm.      The ICU team was asked to evaluate the patient for AMS, AFIB-RVR, DKA, and open wound to left foot concerning for possible osteomyelitis.      8/1/2024  Patient is more awake this morning and less confused, patient is hemodynamically stable without pressors    Blood cultures has shown gram-positive cocci patient is on Vanco and Zosyn    ID is following him    He is not still in A-fib but his rate controlled patient initially was on diltiazem drip that was stopped    We have also stopped the insulin drip his anion gap is normal    His white cell count is trending down      Review of Systems   Otherwise complete ROS reviewed and negative except as mentioned in the HPI.    Past Medical History:   Past Medical History:   Diagnosis Date    Arthritis     Autonomic disease     CAD (coronary artery disease) 02/06/2017    Cervical radiculopathy 09/16/2021    Chronic constipation with acute exaccerbation 05/10/2021    Coronary artery disease     Degeneration of cervical intervertebral disc 08/11/2021    Diabetes mellitus     Diabetic foot ulcer 08/31/2020     Diabetic polyneuropathy associated with type 2 diabetes mellitus 01/18/2021    Elevated cholesterol     Gastroesophageal reflux disease 05/13/2019    Headache     HTN (hypertension), benign 05/03/2017    Hyperlipidemia     Hypertension     Mixed hyperlipidemia 02/07/2017    Multiple lung nodules 01/26/2020    5mm, 9 mm RLL identified 1/2020, not present 10/2019.    Myocardial infarction     Osteomyelitis 01/22/2020    Osteomyelitis of fifth toe of right foot 10/07/2019    Pancreatitis     Persistent insomnia 01/20/2020    Renal disorder     Sleep apnea 02/06/2017    Sleep apnea with use of continuous positive airway pressure (CPAP)     NON-COMPLIANT    Slow transit constipation 01/16/2019    Spinal stenosis in cervical region 09/16/2021    Vitamin D deficiency 03/02/2021     Past Surgical History:  Past Surgical History:   Procedure Laterality Date    ABDOMINAL SURGERY      AMPUTATION FOOT / TOE Left 10/2021    5th digit     ANTERIOR CERVICAL DISCECTOMY W/ FUSION N/A 08/05/2022    Procedure: CERVICAL DISCECTOMY ANTERIOR WITH FUSION C5-6 with possible plating of C5-7 with neuromonitoring and 1 c-arm;  Surgeon: Karel Soliz MD;  Location:  [facility] OR;  Service: Neurosurgery;  Laterality: N/A;    APPENDECTOMY      BACK SURGERY      CARDIAC CATHETERIZATION Left 02/08/2021    Procedure: Left Heart Cath w poss intervention left anatomical snuff box acess;  Surgeon: Omkar Charles DO;  Location:  [facility] CATH INVASIVE LOCATION;  Service: Cardiology;  Laterality: Left;    CARDIAC SURGERY      CATARACT EXTRACTION      CERVICAL SPINE SURGERY      COLONOSCOPY N/A 01/31/2017    Normal exam repeat in 5 years    COLONOSCOPY N/A 02/11/2019    Mild acute inflammation    COLONOSCOPY N/A 04/07/2024    2 areas at 10 and 20 cm with friability ulceration 2 clips placed at 20 cm and 4 clips at 10 cm poor prep normal mucosa,mild eroisions and ulcerations in visible vessels    COLONOSCOPY N/A 7/1/2024    Procedure: COLONOSCOPY  "WITH ANESTHESIA;  Surgeon: Arsalan Lorenzo DO;  Location: [facility] ENDOSCOPY;  Service: Gastroenterology;  Laterality: N/A;  pre op constipation/diarrhea  post poor prep  pcp Del Shetty    COLONOSCOPY N/A 7/2/2024    Procedure: COLONOSCOPY WITH ANESTHESIA;  Surgeon: Agapito Christopher MD;  Location: [facility] ENDOSCOPY;  Service: Gastroenterology;  Laterality: N/A;  pre rectal bleeding  post poor prep  pcp Del Shetty MD    COLONOSCOPY W/ POLYPECTOMY  03/04/2014    Hyperplastic polyp    CORONARY ARTERY BYPASS GRAFT  10/2015    ENDOSCOPY  04/13/2011    Gastritis with hemorrhage    ENDOSCOPY N/A 05/05/2017    Normal exam    ENDOSCOPY N/A 02/11/2019    Gastritis    ENDOSCOPY N/A 09/01/2020    Non-erosive gastritis with hemorrhage    ENDOSCOPY N/A 02/10/2021    Esophagitis    ENDOSCOPY N/A 04/11/2024    There were esophageal mucosal changes suspicious for short-segment Low's esophagus present in the distal esophagus. The maximum longitudinal extent of these mucosal changes was 2 cm in length. Mucosa was biopsied with a cold forceps for histologyDistal esophagus, biopsies: Mild chronic active esophagogastritis. No evidence of intestinal metaplasia, dysplasia. Antrum, bx, Mild chronic gastritis    FOOT SURGERY Left     INCISION AND DRAINAGE OF WOUND Left 09/2007    spider bite     Social History:  reports that he quit smoking about 33 years ago. His smoking use included cigarettes. He has never used smokeless tobacco. He reports that he does not drink alcohol and does not use drugs.    Family History: family history includes Alzheimer's disease in his mother; Colon cancer in his father and sister; Colon polyps in his sister; Coronary artery disease in his sister and sister; Heart disease in his father.     Allergies:  Allergies   Allergen Reactions    Cefepime Hives and Anaphylaxis    Bactrim [Sulfamethoxazole-Trimethoprim] Other (See Comments)     \"RENAL FAILURE\"    Vancomycin Itching    Zolpidem Mental Status " "Change     \"makes him crazy\"    Metronidazole Rash       Medications:  Prior to Admission medications    Medication Sig Start Date End Date Taking? Authorizing Provider   ascorbic acid (VITAMIN C) 1000 MG tablet Take 1 tablet by mouth Daily. 8/25/23      bumetanide (BUMEX) 1 MG tablet Take 1 tablet by mouth 2 (Two) Times a Day for 30 days. 7/3/24 8/2/24  Josué De Oliveira MD   busPIRone (BUSPAR) 10 MG tablet Take 1 tablet by mouth 2 (Two) Times a Day.    ProviderBossman MD   calcitriol (ROCALTROL) 0.5 MCG capsule Take 1 capsule by mouth Daily. 2/23/23      carvedilol (COREG) 3.125 MG tablet Take 1 tablet twice a day by oral route. 3/26/24      Diclofenac Sodium (VOLTAREN) 1 % gel gel Apply 2 g topically to the appropriate area as directed 4 (Four) Times a Day As Needed. 6/1/23      donepezil (ARICEPT) 10 MG tablet Take 1 tablet by mouth Daily. 5/3/24      DULoxetine (CYMBALTA) 60 MG capsule Take 1 capsule by mouth Daily. 3/11/24      famotidine (PEPCID) 20 MG tablet Take 1 tablet twice a day by oral route. 3/26/24      fluticasone (FLONASE) 50 MCG/ACT nasal spray Instill 1 spray in each nostril as directed by provider Daily. 4/18/24      Insulin Glargine (Lantus SoloStar) 100 UNIT/ML injection pen Inject 20 Units under the skin into the appropriate area as directed every night at bedtime. 5/9/24      Insulin Regular Human, Conc, (HumuLIN R) 500 UNIT/ML solution pen-injector CONCENTRATED injection Inject 15 Units under the skin into the appropriate area as directed 3 (Three) Times a Day Before Meals.    Bossman Blount MD   Insulin Regular Human, Conc, (HumuLIN R) 500 UNIT/ML solution pen-injector CONCENTRATED injection Inject 40 Units under the skin into the appropriate area as directed 3 (Three) Times a Day Before Meals. Inject 40 units under the skin in the the appropriate area with regular meals AND 40 units with large meals.    ProviderBossman MD   Iron-Vitamin C (Vitron-C)  MG tablet " Take 1 tablet twice a day by oral route. 4/18/24      levocetirizine (XYZAL) 5 MG tablet Take 1 tablet by mouth every day at bedtime. 5/9/24      melatonin 3 MG tablet Take 2 tablets by mouth At Night As Needed for Sleep. 4/11/24   Rene Maloney MD   nitroglycerin (NITROSTAT) 0.4 MG SL tablet Place 1 tablet under the tongue Every 5 (Five) Minutes As Needed for Chest Pain. Take no more than 3 doses in 15 minutes.    ProviderBossman MD   oxyCODONE (ROXICODONE) 10 MG tablet Take 1 tablet by mouth 2 (Two) Times a Day As Needed. Must last 30 days per md. 7/24/24      pantoprazole (PROTONIX) 40 MG EC tablet Take 1 tablet by mouth Every 12 (Twelve) Hours. 6/19/24      polyethylene glycol (MIRALAX) 17 GM/SCOOP powder Take 17 g by mouth Daily As Needed (constipation).    ProviderBossman MD   pregabalin (LYRICA) 100 MG capsule Take 2 capsules by mouth Every Night.    ProviderBossman MD   pregabalin (LYRICA) 100 MG capsule Take 1 capsule by mouth Every Morning, THEN 2 capsules Every Evening. 7/24/24 9/21/24     rosuvastatin (CRESTOR) 10 MG tablet Take 1 tablet by mouth Every Night. 5/9/24      sennosides-docusate (PERICOLACE) 8.6-50 MG per tablet Take 1 tablet by mouth Every Night. Obtain OTC 8/23/23   Lexie Houston APRN   sodium hypochlorite (DAKIN'S 1/4 STRENGTH) 0.125 % solution topical solution 0.125% Apply 1 application topically to the appropriate area as directed 2 (Two) Times a Day. 5/13/24      sucralfate (CARAFATE) 1 g tablet Take 1 tablet by mouth 4 (Four) Times a Day before meals. 5/3/24      tamsulosin (FLOMAX) 0.4 MG capsule 24 hr capsule Take 1 capsule by mouth Daily. 8/7/23      spironolactone (ALDACTONE) 25 MG tablet Take 1 tablet by mouth Daily. 9/28/20 12/31/20  Del Shetty MD     I have utilized all available immediate resources to obtain, update, or review the patient's current medications (including all prescriptions, over-the-counter products, herbals, cannabis/cannabidiol  "products, and vitamin/mineral/dietary (nutritional) supplements).    Objective     Vital Signs: /75   Pulse 72   Temp 96.4 °F (35.8 °C) (Axillary)   Resp 16   Ht 182.9 cm (72\")   Wt 130 kg (286 lb 13.1 oz)   SpO2 97%   BMI 38.90 kg/m²   Physical Exam   General :patient is comfortable not in distress    Head exam: Atraumatic ,normocephalic    Neck exam: No rigidity    Cardiac exam : normal cardiac sounds, no murmurs, no added sounds, normal rate and rhythm    Chest exam : Normal breath sounds, no crepitation, no rhonchi, normal work of breathing    Abdominal exam: Soft abdomen, no organomegaly, no tenderness    Skin exam: No rashes, no petechiae, left foot ulcer and wound      Results Reviewed:    Lab Results (last 24 hours)       Procedure Component Value Units Date/Time    Iron Profile [[id number]] Collected: 08/01/24 0419    Specimen: Blood Updated: 08/01/24 1148    POC Glucose Once [[id number]]  (Abnormal) Collected: 08/01/24 1123    Specimen: Blood Updated: 08/01/24 1134     Glucose 242 mg/dL      Comment: : [id number] [employer] ID: [id number]       Blood Culture ID, PCR - Blood, Arm, Left [[id number]]  (Abnormal) Collected: 07/31/24 1849    Specimen: Blood from Arm, Left Updated: 08/01/24 1117     BCID, PCR Staph spp, not aureus or lugdunensis. Identification by BCID2 PCR.     BOTTLE TYPE Aerobic Bottle    Blood Culture - Blood, Arm, Left [[id number]]  (Abnormal) Collected: 07/31/24 1849    Specimen: Blood from Arm, Left Updated: 08/01/24 1006     Blood Culture Abnormal Stain     Gram Stain Aerobic Bottle Gram positive cocci in clusters    POC Glucose Once [[id number]]  (Abnormal) Collected: 08/01/24 0823    Specimen: Blood Updated: 08/01/24 0834     Glucose 319 mg/dL      Comment: : [id number] [employer] ID: [id number]       POC Glucose Once [[id number]]  (Abnormal) Collected: 08/01/24 0556    Specimen: Blood Updated: 08/01/24 0607     Glucose 338 mg/dL      Comment: " : [id number] Thee [employer] ID: [id number]       Basic Metabolic Panel [[id number]]  (Abnormal) Collected: 08/01/24 0419    Specimen: Blood Updated: 08/01/24 0515     Glucose 370 mg/dL      BUN 50 mg/dL      Creatinine 2.28 mg/dL      Sodium 138 mmol/L      Potassium 4.1 mmol/L      Chloride 103 mmol/L      CO2 21.0 mmol/L      Calcium 8.8 mg/dL      BUN/Creatinine Ratio 21.9     Anion Gap 14.0 mmol/L      eGFR 30.5 mL/min/1.73     Narrative:      GFR Normal >60  Chronic Kidney Disease <60  Kidney Failure <15      Magnesium [[id number]]  (Abnormal) Collected: 08/01/24 0419    Specimen: Blood Updated: 08/01/24 0515     Magnesium 2.5 mg/dL     Phosphorus [[id number]]  (Normal) Collected: 08/01/24 0419    Specimen: Blood Updated: 08/01/24 0512     Phosphorus 3.6 mg/dL     CBC & Differential [[id number]]  (Abnormal) Collected: 08/01/24 0419    Specimen: Blood Updated: 08/01/24 0452    Narrative:      The following orders were created for panel order CBC & Differential.  Procedure                               Abnormality         Status                     ---------                               -----------         ------                     CBC Auto Differential[[id number]]        Abnormal            Final result                 Please view results for these tests on the individual orders.    CBC Auto Differential [[id number]]  (Abnormal) Collected: 08/01/24 0419    Specimen: Blood Updated: 08/01/24 0452     WBC 12.83 10*3/mm3      RBC 3.38 10*6/mm3      Hemoglobin 9.0 g/dL      Hematocrit 28.3 %      MCV 83.7 fL      MCH 26.6 pg      MCHC 31.8 g/dL      RDW 14.5 %      RDW-SD 44.3 fl      MPV 12.7 fL      Platelets 176 10*3/mm3      Neutrophil % 79.2 %      Lymphocyte % 10.1 %      Monocyte % 9.6 %      Eosinophil % 0.2 %      Basophil % 0.3 %      Immature Grans % 0.6 %      Neutrophils, Absolute 10.16 10*3/mm3      Lymphocytes, Absolute 1.29 10*3/mm3      Monocytes, Absolute 1.23 10*3/mm3      Eosinophils, Absolute  0.03 10*3/mm3      Basophils, Absolute 0.04 10*3/mm3      Immature Grans, Absolute 0.08 10*3/mm3      nRBC 0.0 /100 WBC     MRSA Screen, PCR (Inpatient) - Swab, Nares [[id number]]  (Normal) Collected: 08/01/24 0206    Specimen: Swab from Nares Updated: 08/01/24 0346     MRSA PCR No MRSA Detected    Narrative:      The negative predictive value of this diagnostic test is high and should only be used to consider de-escalating anti-MRSA therapy. A positive result may indicate colonization with MRSA and must be correlated clinically.    Basic Metabolic Panel [[id number]]  (Abnormal) Collected: 08/01/24 0210    Specimen: Blood Updated: 08/01/24 0253     Glucose 379 mg/dL      BUN 51 mg/dL      Creatinine 2.39 mg/dL      Sodium 136 mmol/L      Potassium 4.0 mmol/L      Chloride 103 mmol/L      CO2 19.0 mmol/L      Calcium 8.7 mg/dL      BUN/Creatinine Ratio 21.3     Anion Gap 14.0 mmol/L      eGFR 28.8 mL/min/1.73     Narrative:      GFR Normal >60  Chronic Kidney Disease <60  Kidney Failure <15      STAT Lactic Acid, Reflex [[id number]]  (Normal) Collected: 08/01/24 0210    Specimen: Blood Updated: 08/01/24 0250     Lactate 1.3 mmol/L     POC Glucose Once [[id number]]  (Abnormal) Collected: 08/01/24 0210    Specimen: Blood Updated: 08/01/24 0221     Glucose 360 mg/dL      Comment: : [id number] [employer] ID: [id number]       Basic Metabolic Panel [[id number]]  (Abnormal) Collected: 08/01/24 0021    Specimen: Blood Updated: 08/01/24 0053     Glucose 370 mg/dL      BUN 52 mg/dL      Creatinine 2.46 mg/dL      Sodium 138 mmol/L      Potassium 4.1 mmol/L      Comment: Specimen hemolyzed.  Result may be falsely elevated.        Chloride 103 mmol/L      CO2 21.0 mmol/L      Calcium 8.8 mg/dL      BUN/Creatinine Ratio 21.1     Anion Gap 14.0 mmol/L      eGFR 27.8 mL/min/1.73     Narrative:      GFR Normal >60  Chronic Kidney Disease <60  Kidney Failure <15      Phosphorus [[id number]]  (Normal) Collected: 08/01/24 0021     Specimen: Blood Updated: 08/01/24 0043     Phosphorus 3.5 mg/dL     Magnesium [[id number]]  (Normal) Collected: 08/01/24 0021    Specimen: Blood Updated: 08/01/24 0042     Magnesium 2.4 mg/dL     POC Glucose Once [[id number]]  (Abnormal) Collected: 08/01/24 0010    Specimen: Blood Updated: 08/01/24 0022     Glucose 353 mg/dL      Comment: : [id number] [employer] ID: [id number]       POC Glucose Once [[id number]]  (Abnormal) Collected: 07/31/24 2323    Specimen: Blood Updated: 07/31/24 2334     Glucose 237 mg/dL      Comment: : [id number] [employer] ID: [id number]       STAT Lactic Acid, Reflex [[id number]]  (Abnormal) Collected: 07/31/24 2240    Specimen: Blood Updated: 07/31/24 2300     Lactate 2.2 mmol/L     POC Glucose Once [[id number]]  (Abnormal) Collected: 07/31/24 2238    Specimen: Blood Updated: 07/31/24 2250     Glucose 369 mg/dL      Comment: : [id number] [employer] ID: [id number]       POC Glucose Once [[id number]]  (Abnormal) Collected: 07/31/24 2124    Specimen: Blood Updated: 07/31/24 2136     Glucose 543 mg/dL      Comment: : [id number] [employer] ID: [id number]       Basic Metabolic Panel [[id number]]  (Abnormal) Collected: 07/31/24 2030    Specimen: Blood Updated: 07/31/24 2111     Glucose 653 mg/dL      BUN 54 mg/dL      Creatinine 2.64 mg/dL      Sodium 133 mmol/L      Potassium 4.4 mmol/L      Chloride 98 mmol/L      CO2 20.0 mmol/L      Calcium 8.9 mg/dL      BUN/Creatinine Ratio 20.5     Anion Gap 15.0 mmol/L      eGFR 25.6 mL/min/1.73     Narrative:      GFR Normal >60  Chronic Kidney Disease <60  Kidney Failure <15      Phosphorus [[id number]]  (Normal) Collected: 07/31/24 1849    Specimen: Blood Updated: 07/31/24 2110     Phosphorus 2.7 mg/dL     Magnesium [[id number]]  (Normal) Collected: 07/31/24 1849    Specimen: Blood Updated: 07/31/24 2110     Magnesium 2.2 mg/dL     Osmolality, Serum [[id number]]  (Abnormal) Collected: 07/31/24 2030    Specimen:  Blood Updated: 07/31/24 2103     Osmolality 329 mOsm/kg     High Sensitivity Troponin T 2Hr [[id number]]  (Abnormal) Collected: 07/31/24 2030    Specimen: Blood Updated: 07/31/24 2058     HS Troponin T 353 ng/L      Troponin T Delta 11 ng/L     Narrative:      High Sensitive Troponin T Reference Range:  <14.0 ng/L- Negative Female for AMI  <22.0 ng/L- Negative Male for AMI  >=14 - Abnormal Female indicating possible myocardial injury.  >=22 - Abnormal Male indicating possible myocardial injury.   Clinicians would have to utilize clinical acumen, EKG, Troponin, and serial changes to determine if it is an Acute Myocardial Infarction or myocardial injury due to an underlying chronic condition.         Magnesium [[id number]]  (Normal) Collected: 07/31/24 2030    Specimen: Blood Updated: 07/31/24 2056     Magnesium 2.3 mg/dL     Phosphorus [[id number]]  (Normal) Collected: 07/31/24 2030    Specimen: Blood Updated: 07/31/24 2054     Phosphorus 3.1 mg/dL     POC Glucose Once [[id number]]  (Abnormal) Collected: 07/31/24 2021    Specimen: Blood Updated: 07/31/24 2033     Glucose 575 mg/dL      Comment: : [id number] [facility] ID: [id number]       Blood Gas, Venous - [[id number]]  (Abnormal) Collected: 07/31/24 2021    Specimen: Venous Blood Updated: 07/31/24 2021     Site OTHER     pH, Venous 7.410 pH Units      pCO2, Venous 32.2 mm Hg      Comment: 84 Value below reference range        pO2, Venous 55.6 mm Hg      Comment: 83 Value above reference range        HCO3, Venous 20.4 mmol/L      Comment: 84 Value below reference range        Base Excess, Venous -3.6 mmol/L      Comment: 84 Value below reference range        O2 Saturation, Venous 89.9 %      Comment: 83 Value above reference range        Temperature 37.0     Barometric Pressure for Blood Gas 751 mmHg      Modality Room Air     FIO2 21 %      Ventilator Mode NA     Collected by [id number]     Comment: Meter: [id number]     :  [email or web address]        Hemoglobin A1c [[id number]]  (Abnormal) Collected: 07/31/24 1849    Specimen: Blood Updated: 07/31/24 2004     Hemoglobin A1C 12.60 %     Narrative:      Hemoglobin A1C Ranges:    Increased Risk for Diabetes  5.7% to 6.4%  Diabetes                     >= 6.5%  Diabetic Goal                < 7.0%    Comprehensive Metabolic Panel [[id number]]  (Abnormal) Collected: 07/31/24 1849    Specimen: Blood Updated: 07/31/24 1944     Glucose 704 mg/dL      BUN 55 mg/dL      Creatinine 2.67 mg/dL      Sodium 132 mmol/L      Potassium 4.5 mmol/L      Chloride 97 mmol/L      CO2 19.0 mmol/L      Calcium 9.0 mg/dL      Total Protein 6.7 g/dL      Albumin 3.5 g/dL      ALT (SGPT) 8 U/L      AST (SGOT) 11 U/L      Alkaline Phosphatase 101 U/L      Total Bilirubin 0.6 mg/dL      Globulin 3.2 gm/dL      A/G Ratio 1.1 g/dL      BUN/Creatinine Ratio 20.6     Anion Gap 16.0 mmol/L      eGFR 25.2 mL/min/1.73     Narrative:      GFR Normal >60  Chronic Kidney Disease <60  Kidney Failure <15      C-reactive Protein [[id number]]  (Abnormal) Collected: 07/31/24 1849    Specimen: Blood Updated: 07/31/24 1932     C-Reactive Protein 17.04 mg/dL     High Sensitivity Troponin T [[id number]]  (Abnormal) Collected: 07/31/24 1849    Specimen: Blood Updated: 07/31/24 1929     HS Troponin T 342 ng/L     Narrative:      High Sensitive Troponin T Reference Range:  <14.0 ng/L- Negative Female for AMI  <22.0 ng/L- Negative Male for AMI  >=14 - Abnormal Female indicating possible myocardial injury.  >=22 - Abnormal Male indicating possible myocardial injury.   Clinicians would have to utilize clinical acumen, EKG, Troponin, and serial changes to determine if it is an Acute Myocardial Infarction or myocardial injury due to an underlying chronic condition.         Lactic Acid, Plasma [[id number]]  (Abnormal) Collected: 07/31/24 1849    Specimen: Blood Updated: 07/31/24 1928     Lactate 2.4 mmol/L     Urinalysis With Culture If Indicated - Straight Cath [[id number]]   (Abnormal) Collected: 07/31/24 1851    Specimen: Urine from Straight Cath Updated: 07/31/24 1924     Color, UA Yellow     Appearance, UA Clear     pH, UA <=5.0     Specific Gravity, UA 1.028     Glucose, UA >=1000 mg/dL (3+)     Ketones, UA Trace     Bilirubin, UA Negative     Blood, UA Trace     Protein,  mg/dL (2+)     Leuk Esterase, UA Negative     Nitrite, UA Positive     Urobilinogen, UA 0.2 E.U./dL    Narrative:      In absence of clinical symptoms, the presence of pyuria, bacteria, and/or nitrites on the urinalysis result does not correlate with infection.    Urinalysis, Microscopic Only - Straight Cath [[id number]]  (Abnormal) Collected: 07/31/24 1851    Specimen: Urine from Straight Cath Updated: 07/31/24 1924     RBC, UA 3-5 /HPF      WBC, UA 6-10 /HPF      Bacteria, UA 2+ /HPF      Squamous Epithelial Cells, UA 0-2 /HPF      Hyaline Casts, UA None Seen /LPF      Methodology Manual Light Microscopy    Urine Culture - Urine, Straight Cath [[id number]] Collected: 07/31/24 1851    Specimen: Urine from Straight Cath Updated: 07/31/24 1924    Acetone [[id number]]  (Abnormal) Collected: 07/31/24 1849    Specimen: Blood Updated: 07/31/24 1910     Acetone Small    Sedimentation Rate [[id number]]  (Abnormal) Collected: 07/31/24 1849    Specimen: Blood Updated: 07/31/24 1910     Sed Rate 57 mm/hr     CBC & Differential [[id number]]  (Abnormal) Collected: 07/31/24 1849    Specimen: Blood Updated: 07/31/24 1906    Narrative:      The following orders were created for panel order CBC & Differential.  Procedure                               Abnormality         Status                     ---------                               -----------         ------                     CBC Auto Differential[[id number]]        Abnormal            Final result                 Please view results for these tests on the individual orders.    CBC Auto Differential [[id number]]  (Abnormal) Collected: 07/31/24 1849    Specimen: Blood  Updated: 07/31/24 1906     WBC 15.48 10*3/mm3      RBC 3.55 10*6/mm3      Hemoglobin 9.8 g/dL      Hematocrit 29.6 %      MCV 83.4 fL      MCH 27.6 pg      MCHC 33.1 g/dL      RDW 14.5 %      RDW-SD 43.8 fl      MPV 13.1 fL      Platelets 201 10*3/mm3      Neutrophil % 88.7 %      Lymphocyte % 2.9 %      Monocyte % 7.1 %      Eosinophil % 0.1 %      Basophil % 0.3 %      Immature Grans % 0.9 %      Neutrophils, Absolute 13.73 10*3/mm3      Lymphocytes, Absolute 0.45 10*3/mm3      Monocytes, Absolute 1.10 10*3/mm3      Eosinophils, Absolute 0.01 10*3/mm3      Basophils, Absolute 0.05 10*3/mm3      Immature Grans, Absolute 0.14 10*3/mm3     Blood Culture - Blood, Hand, Left [[id number]] Collected: 07/31/24 1849    Specimen: Blood from Hand, Left Updated: 07/31/24 1902            CT Abdomen Pelvis With Contrast    Result Date: 8/1/2024  EXAMINATION: CT ABDOMEN PELVIS W CONTRAST-   7/31/2024 10:41 PM  HISTORY: abdominal distention with pain; M86.9-Osteomyelitis, unspecified; E11.10-Type 2 diabetes mellitus with ketoacidosis without coma; R79.89-Other specified abnormal findings of blood chemistry; I48.91-Unspecified atrial fibrillation; R41.0-Disorientation, unspecified  In order to have a CT radiation dose as low as reasonably achievable Automated Exposure Control was utilized for adjustment of the mA and/or KV according to patient size.  Total DLP = 1841.72 mGy.cm  The CT scan of the abdomen and pelvis is performed after intravenous contrast enhancement.  The images are acquired in axial plane and subsequent reconstruction in coronal and sagittal planes.  Comparison is made with the previous study dated 6/27/2024.  The lung bases included in the study show a trace right and small left basal pleural effusion. There are mild atelectatic changes at bilateral bases left more than the right.  Limited visualized cardiomediastinal structures show atheromatous changes of the coronary arteries. There is moderate cardiomegaly.   The liver and spleen are normal.  The gallbladder is surgically absent.  Fatty infiltrated pancreas seen. No focal abnormality. No ductal dilatation. The adrenal glands are normal.  There is persistent bilateral significant perinephric fat infiltration and thickening of the pararenal fascia which is similar to the previous study. Bilateral nephrogram is normal and symmetrical. No calculi. No hydronephrosis. There is a well-defined sharply marginated low density exophytic mass from the lower pole of the right kidney measuring 4.4 cm in diameter. CT density suggest a cyst. Limited visualized ureters are normal and nondilated. The urinary bladder is well distended. No intrinsic abnormality.  Prostate is not significantly enlarged.  There are small fat-containing inguinal hernias, right larger than the left.  There is subcutaneous fat infiltration of the entire abdominal wall extending into the lower extremity. This may represent a fluid overload?.  The stomach is decompressed with moderate wall thickening. No focal abnormality Alamast. Duodenum is normal. Small bowel is nondistended and nondilated. Appendix is surgically absent. There is significant large volume stool throughout the colon. No finding to suggest obstruction.  Atheromatous changes of the abdominal aorta and iliac arteries. No aneurysmal dilatation.  Moderately prominent nonspecific retroperitoneal para-aortic lymph nodes predominantly in the mid abdomen. A referenced left para iliac lymph node, image #57 in series 2 and image #40 and series 3, measures 1.6 cm in short axis. There is a large lymph node in the left lower anterior pelvis/external iliac group measuring 1.3 cm in short axis. There are moderately prominent inguinal lymph nodes. A left inguinal lymph node measures 2 cm in short axis.  Images reviewed in bone window show chronic degenerative changes of the lumbar spine. No acute bony abnormality.      Impression: 1. A significant perinephric fat  stranding is similar to the previous study and is a nonspecific finding. Possibility for chronic inflammatory process/chronic pyelonephritis may not be excluded. Renal functions are normal and symmetrical. 2. Fatty infiltration of the pancreas. No mass. No ductal dilatation. 3. Nonspecific abdominal, pelvic and inguinal lymphadenopathy. The etiology and clinical significance is not certain. This appears moderately more progressive since the previous study. 4. Diffuse subcutaneous fat infiltration of the abdominal wall extending into the lower extremity may represent fluid overload?. 5. A significant large volume of stool in the colon without evidence of obstruction. This may represent constipation.  The above study was initially reviewed and reported by [employer]. I do not find any discrepancies.           This report was signed and finalized on 8/1/2024 7:50 AM by Dr. Carlos Cutler MD.      CT Head Without Contrast    Result Date: 8/1/2024  EXAMINATION: CT HEAD WO CONTRAST-   7/31/2024 10:41 PM  HISTORY: altered mental status; M86.9-Osteomyelitis, unspecified; E11.10-Type 2 diabetes mellitus with ketoacidosis without coma; R79.89-Other specified abnormal findings of blood chemistry; I48.91-Unspecified atrial fibrillation; R41.0-Disorientation, unspecified  In order to have a CT radiation dose as low as reasonably achievable Automated Exposure Control was utilized for adjustment of the mA and/or KV according to patient size.  Total DLP = 783.72 mGy.cm  The CT scan of the head is performed without intravenous contrast enhancement.  The images are acquired in axial plane and subsequent reconstruction with coronal and sagittal planes.  Comparison is made with the previous study dated 5/3/2024.  There is no evidence of a mass. There is no midline shift.  There is no evidence of intracranial hemorrhage or hematoma.  Moderately dilated ventricles, basal cisterns and the cortical sulci are similar to the previous study  representing chronic volume loss.  Areas of chronic white matter ischemia bilaterally are noted. The gray-white matter differentiation is maintained.  Posterior fossa structures are normal.  The images reviewed in bone window show no acute displaced skull fracture. A subtle nondisplaced fracture or lesion may be obscured due to motion artifacts. Large mucous retention cyst is seen in the right maxillary antrum. The remaining paranasal sinuses and mastoid air cells are clear.      Impression: 1. No acute intracranial abnormality. 2. Chronic ischemic and atrophic changes. 3. Chronic maxillary sinusitis.  The above study was initially reviewed and reported by [facility]. I do not find any discrepancies.             This report was signed and finalized on 8/1/2024 5:27 AM by Dr. Carlos Cutler MD.      XR Foot 3+ View Left    Result Date: 7/31/2024  EXAMINATION:  XR FOOT 3+ VW LEFT-  7/31/2024 6:22 PM  HISTORY: Heel wound.  COMPARISON: 3/26/2024.  TECHNIQUE: 3 views were obtained.  FINDINGS: There is a deep ulcer on the sole of the foot posteriorly. The ulcer appears to be packed with some type of radiopaque material. On the lateral image, there may be a small area of bony erosion involving the calcaneus just posterior to the plantar calcaneal spur. A small area of osteomyelitis is not ruled out. There has been prior amputation of the fifth toe and distal fifth metatarsal. There may have been prior resection of the distal fourth metacarpal and a portion of the proximal phalanx of the fourth toe versus chronic erosive change. The appearance is stable. There is severe narrowing of the first MTP joint. There is narrowing of some of the interphalangeal joints. There is some spurring in the tarsal region and at the tarsal-metatarsal junction. There is soft tissue swelling of the foot diffusely. There is a small curvilinear foreign body in the soft tissues along the sole of the foot in the second toe region in the proximal  phalanx area. There is another small linear foreign body in the soft tissues adjacent to the distal phalanx of the great toe.       Impression: 1. Deep ulcer on the sole of the foot posteriorly near the calcaneus. There is a questionable small area of bony erosion along the plantar surface of the calcaneus just proximal to the plantar calcaneal spur. Osteomyelitis cannot be ruled out. The soft tissue ulcer is packed with some type of radiopaque material. 2. Linear foreign bodies projected over the soft tissues of the second toe and first toe. Artifacts are also included in the differential. 3. Other chronic changes, as discussed.   This report was signed and finalized on 7/31/2024 7:47 PM by Dr. Raz Mendes MD.      XR Chest 1 View    Result Date: 7/31/2024  EXAMINATION:  XR CHEST 1 VW-  7/31/2024 6:22 PM  HISTORY: Altered mental status. Hypertension and diabetes.  COMPARISON: 6/28/2024.  TECHNIQUE: Single view AP image.  FINDINGS: There is hypoventilation with vascular crowding. There is mild bronchial wall thickening, stable. There is no dense infiltrate or effusion. Heart size is borderline. Prior heart bypass surgery. Prior cervical fusion. No definite acute bony abnormality.       Impression: 1. Hypoventilation with vascular crowding. 2. Mild bronchial wall thickening, stable.    This report was signed and finalized on 7/31/2024 7:41 PM by Dr. Raz Mendes MD.       Assessment / Plan   Assessment and Plan    Active Hospital Problems    Diagnosis     **DKA, type 2, not at goal      AFIB-RVR  Patient initially came with RVR he was on diltiazem drip which was stopped currently he is in A-fib but rate controlled and hemodynamically stable     DKA that has resolved currently the patient is on subcu insulin the IV insulin was stopped    Urinary Tract Infection  Possible UTI no evidence of obstruction patient is already on broad-spectrum antibiotics and including Zosyn and vancomycin     Diabetic foot  ulcer  -chronic open wound, DC ulcer stage 2-3 to left heal with larger area soft pale tissue with surrounding edema/erythema and weeping at the outer aspect of the foot along the area of the 5th metatarsal.   -Hx of osteomyelitis and related MRSA bacteremia   -Zosyn/Vanco started in ED will continue   -podiatry consult  -wound care consult  ID is consulted     Altered mental status  Secondary to sepsis which is improving   Abdominal pain  -reported nausea/vomiting. Abdominal distention and tender on exam  -Hx of pancreatitis  -previous appendectomy  -Hx of GI bleeding, no active bleeding noted  -No CT abd/pel in ER  -CT abd/pel with contras no evidence of renal obstruction but the possibility of questionable chronic fatty stranding around the kidney which is chronic     CKD-IV  -bun/creatinine at baseline  -monitor lytes and urine output   -avoid neph toxic medications        VTE Prophylaxis:     Pharmacologic VTE prophylaxis orders are present.      I provided 55 minutes of total critical care time. Due to the high probability of clinically significant, life-threatening deterioration, the patient required my direct and personal management. The critical care time does not include time spent on separately billable procedures.  Code Status:    .     Electronically signed by Keren Jamison MD on 8/1/2024 at 11:51 CDT     Electronically signed by Keren Jamison MD at 08/01/24 1202          Consult Notes (last 48 hours)        Brian Lomas MD at 08/01/24 1121          Nephrology ([facility]) Consult Note      Patient:  Erick Luong  YOB: 1955  Date of Service: 8/1/2024  MRN: [id number]   Acct: [id number]   Primary Care Physician: Del Shetty MD  Advance Directive:   Code Status and Medical Interventions: CPR (Attempt to Resuscitate); Full Support   Ordered at: 08/01/24 0053     Level Of Support Discussed With:    Patient     Code Status (Patient has no pulse and is not  breathing):    CPR (Attempt to Resuscitate)     Medical Interventions (Patient has pulse or is breathing):    Full Support     Admit Date: 7/31/2024       Hospital Day: 1  Referring Provider: Abebe Garzon DO      Patient Seen, Chart, Consults, Notes, Labs, Radiology studies reviewed.    Chief complaint: Abnormal labs.    Subjective:  Erick Luong is a 69 y.o. male  whom we were consulted for acute kidney injury/chronic kidney disease.  Patient has history of stage IIIb chronic kidney disease, follows me in the office.  Patient has history of chronic diabetic foot ulcer, follows Dr. Gomes in [place].  He has poorly controlled type 2 diabetes, hypertension, coronary artery disease and severe abdominal obesity.  He was found confused, apparently wandering in his garage.  He was brought in the emergency room.  Patient has nonhealing diabetic foot ulcer affecting his left foot with serosanguineous discharge.  His blood glucose level was 700 with A1c is 12%.  He is currently admitted to ICU with a diagnosis of sepsis caused by infected diabetic foot ulcer.  He denies any productive cough or shortness of breath.  He has noticed decreased urine output.  His renal function is worsening and nephrology is consulted.    This morning he was seen in CCU, lying flat, denies any shortness of breath.  His hemodynamics are stable    Allergies:  Cefepime, Bactrim [sulfamethoxazole-trimethoprim], Vancomycin, Zolpidem, and Metronidazole    Home Meds:  Medications Prior to Admission   Medication Sig Dispense Refill Last Dose    ascorbic acid (VITAMIN C) 1000 MG tablet Take 1 tablet by mouth Daily. 30 tablet 3     bumetanide (BUMEX) 1 MG tablet Take 1 tablet by mouth 2 (Two) Times a Day for 30 days. 60 tablet 0     busPIRone (BUSPAR) 10 MG tablet Take 1 tablet by mouth 2 (Two) Times a Day.       calcitriol (ROCALTROL) 0.5 MCG capsule Take 1 capsule by mouth Daily. 90 capsule 4     carvedilol (COREG) 3.125 MG tablet Take 1 tablet  twice a day by oral route. 60 tablet 4     Diclofenac Sodium (VOLTAREN) 1 % gel gel Apply 2 g topically to the appropriate area as directed 4 (Four) Times a Day As Needed. 300 g 11     donepezil (ARICEPT) 10 MG tablet Take 1 tablet by mouth Daily. 90 tablet 1     DULoxetine (CYMBALTA) 60 MG capsule Take 1 capsule by mouth Daily. 90 capsule 4     famotidine (PEPCID) 20 MG tablet Take 1 tablet twice a day by oral route. 180 tablet 4     fluticasone (FLONASE) 50 MCG/ACT nasal spray Instill 1 spray in each nostril as directed by provider Daily. 16 g 1     Insulin Glargine (Lantus SoloStar) 100 UNIT/ML injection pen Inject 20 Units under the skin into the appropriate area as directed every night at bedtime. 15 mL 3     Insulin Regular Human, Conc, (HumuLIN R) 500 UNIT/ML solution pen-injector CONCENTRATED injection Inject 15 Units under the skin into the appropriate area as directed 3 (Three) Times a Day Before Meals.       Insulin Regular Human, Conc, (HumuLIN R) 500 UNIT/ML solution pen-injector CONCENTRATED injection Inject 40 Units under the skin into the appropriate area as directed 3 (Three) Times a Day Before Meals. Inject 40 units under the skin in the the appropriate area with regular meals AND 40 units with large meals.       Iron-Vitamin C (Vitron-C)  MG tablet Take 1 tablet twice a day by oral route. 60 tablet 2     levocetirizine (XYZAL) 5 MG tablet Take 1 tablet by mouth every day at bedtime. 30 tablet 2     melatonin 3 MG tablet Take 2 tablets by mouth At Night As Needed for Sleep. 30 tablet 0     nitroglycerin (NITROSTAT) 0.4 MG SL tablet Place 1 tablet under the tongue Every 5 (Five) Minutes As Needed for Chest Pain. Take no more than 3 doses in 15 minutes.       oxyCODONE (ROXICODONE) 10 MG tablet Take 1 tablet by mouth 2 (Two) Times a Day As Needed. Must last 30 days per md. 55 tablet 0     pantoprazole (PROTONIX) 40 MG EC tablet Take 1 tablet by mouth Every 12 (Twelve) Hours. 180 tablet 4      polyethylene glycol (MIRALAX) 17 GM/SCOOP powder Take 17 g by mouth Daily As Needed (constipation).       pregabalin (LYRICA) 100 MG capsule Take 2 capsules by mouth Every Night.       pregabalin (LYRICA) 100 MG capsule Take 1 capsule by mouth Every Morning, THEN 2 capsules Every Evening. 90 capsule 0     rosuvastatin (CRESTOR) 10 MG tablet Take 1 tablet by mouth Every Night. 30 tablet 2     sennosides-docusate (PERICOLACE) 8.6-50 MG per tablet Take 1 tablet by mouth Every Night. Obtain OTC       sodium hypochlorite (DAKIN'S 1/4 STRENGTH) 0.125 % solution topical solution 0.125% Apply 1 application topically to the appropriate area as directed 2 (Two) Times a Day. 473 mL 5     sucralfate (CARAFATE) 1 g tablet Take 1 tablet by mouth 4 (Four) Times a Day before meals. 360 tablet 1     tamsulosin (FLOMAX) 0.4 MG capsule 24 hr capsule Take 1 capsule by mouth Daily. 90 capsule 1        Medicines:  Current Facility-Administered Medications   Medication Dose Route Frequency Provider Last Rate Last Admin    sennosides-docusate (PERICOLACE) 8.6-50 MG per tablet 2 tablet  2 tablet Oral BID Lamine Figueroa APRN   2 tablet at 08/01/24 0829    And    polyethylene glycol (MIRALAX) packet 17 g  17 g Oral Daily PRN Lamine Figueroa APRN        And    bisacodyl (DULCOLAX) EC tablet 5 mg  5 mg Oral Daily PRN Lamine Figueroa APRN        And    bisacodyl (DULCOLAX) suppository 10 mg  10 mg Rectal Daily PRN Lamine Figueroa APRN        Calcium Replacement - Follow Nurse / BPA Driven Protocol   Does not apply PRN Abebe Garzon DO        [START ON 8/2/2024] Chlorhexidine Gluconate Cloth 2 % pads 1 Application  1 Application Topical Q24H Lamine Figueroa APRN        dextrose (D50W) (25 g/50 mL) IV injection 10-50 mL  10-50 mL Intravenous Q15 Min PRN Abebe Garzon DO        dextrose (D50W) (25 g/50 mL) IV injection 25 g  25 g Intravenous Q15 Min PRN Lamine Figueroa APRN        dextrose (GLUTOSE) oral gel 15 g  15 g Oral Q15  Min PRN Lamine Figueroa APRN        dilTIAZem (CARDIZEM) 125 mg in 125 mL D5W infusion  5-15 mg/hr Intravenous Titrated Abebe Garzon DO   Stopped at 08/01/24 0018    Enoxaparin Sodium (LOVENOX) syringe 135 mg  1 mg/kg Subcutaneous Q12H Abebe Garzon DO   135 mg at 08/01/24 0830    glucagon (GLUCAGEN) injection 1 mg  1 mg Intramuscular Q15 Min PRN Lamine Figueroa APRN        insulin glargine (LANTUS, SEMGLEE) injection 10 Units  10 Units Subcutaneous Daily Keren Jamison MD   10 Units at 08/01/24 0954    insulin regular (humuLIN R,novoLIN R) injection 3-14 Units  3-14 Units Subcutaneous 4x Daily AC & at Bedtime Keren Jamison MD        Magnesium Standard Dose Replacement - Follow Nurse / BPA Driven Protocol   Does not apply PRN Abebe Garzon DO        mupirocin (BACTROBAN) 2 % nasal ointment 1 Application  1 Application Each Nare BID Lamine Figueroa APRN   1 Application at 08/01/24 0954    nitroglycerin (NITROSTAT) SL tablet 0.4 mg  0.4 mg Sublingual Q5 Min PRN Lamine Figueroa APRN        Pharmacy to Dose enoxaparin (LOVENOX)   Does not apply Continuous PRN Abebe Garzon DO        Pharmacy to dose vancomycin   Does not apply Continuous PRN Lamine Figueroa APRN        Pharmacy to Dose Zosyn   Does not apply Continuous PRN Lamine Figueroa APRN        Phosphorus Replacement - Follow Nurse / BPA Driven Protocol   Does not apply PRN Abebe Garzon DO        piperacillin-tazobactam (ZOSYN) 3.375 g IVPB in 100 mL NS MBP (CD)  3.375 g Intravenous Q8H Lamine Figueroa APRN   3.375 g at 08/01/24 0948    Potassium Replacement - Follow Nurse / BPA Driven Protocol   Does not apply PRN Abebe Garzon DO        sodium chloride 0.9 % flush 10 mL  10 mL Intravenous Q12H Abebe Garzon DO   10 mL at 08/01/24 0829    sodium chloride 0.9 % flush 10 mL  10 mL Intravenous PRN Abebe Garzon,         sodium chloride 0.9 % flush 10 mL  10 mL Intravenous Q12H Lamine Figueroa, APRN   10  mL at 08/01/24 0829    sodium chloride 0.9 % flush 10 mL  10 mL Intravenous PRN Lamine Figueroa APRN        sodium chloride 0.9 % infusion 40 mL  40 mL Intravenous PRN Abebe Garzon,         sodium chloride 0.9 % infusion 40 mL  40 mL Intravenous PRN Lamine Figueroa APRN        sodium hypochlorite (DAKIN'S 1/4 STRENGTH) 0.125 % topical solution 0.125% solution   Topical Q12H Aby High APRN        vancomycin 750 mg/250 mL 0.9% NS IVPB ([facility])  750 mg Intravenous Q24H Lamine Figueroa APRN           Past Medical History:  Past Medical History:   Diagnosis Date    Arthritis     Autonomic disease     CAD (coronary artery disease) 02/06/2017    Cervical radiculopathy 09/16/2021    Chronic constipation with acute exaccerbation 05/10/2021    Coronary artery disease     Degeneration of cervical intervertebral disc 08/11/2021    Diabetes mellitus     Diabetic foot ulcer 08/31/2020    Diabetic polyneuropathy associated with type 2 diabetes mellitus 01/18/2021    Elevated cholesterol     Gastroesophageal reflux disease 05/13/2019    Headache     HTN (hypertension), benign 05/03/2017    Hyperlipidemia     Hypertension     Mixed hyperlipidemia 02/07/2017    Multiple lung nodules 01/26/2020    5mm, 9 mm RLL identified 1/2020, not present 10/2019.    Myocardial infarction     Osteomyelitis 01/22/2020    Osteomyelitis of fifth toe of right foot 10/07/2019    Pancreatitis     Persistent insomnia 01/20/2020    Renal disorder     Sleep apnea 02/06/2017    Sleep apnea with use of continuous positive airway pressure (CPAP)     NON-COMPLIANT    Slow transit constipation 01/16/2019    Spinal stenosis in cervical region 09/16/2021    Vitamin D deficiency 03/02/2021       Past Surgical History:  Past Surgical History:   Procedure Laterality Date    ABDOMINAL SURGERY      AMPUTATION FOOT / TOE Left 10/2021    5th digit     ANTERIOR CERVICAL DISCECTOMY W/ FUSION N/A 08/05/2022    Procedure: CERVICAL DISCECTOMY ANTERIOR WITH  FUSION C5-6 with possible plating of C5-7 with neuromonitoring and 1 c-arm;  Surgeon: Karel Soliz MD;  Location: [facility] OR;  Service: Neurosurgery;  Laterality: N/A;    APPENDECTOMY      BACK SURGERY      CARDIAC CATHETERIZATION Left 02/08/2021    Procedure: Left Heart Cath w poss intervention left anatomical snuff box acess;  Surgeon: Omkar Charles DO;  Location: [facility] CATH INVASIVE LOCATION;  Service: Cardiology;  Laterality: Left;    CARDIAC SURGERY      CATARACT EXTRACTION      CERVICAL SPINE SURGERY      COLONOSCOPY N/A 01/31/2017    Normal exam repeat in 5 years    COLONOSCOPY N/A 02/11/2019    Mild acute inflammation    COLONOSCOPY N/A 04/07/2024    2 areas at 10 and 20 cm with friability ulceration 2 clips placed at 20 cm and 4 clips at 10 cm poor prep normal mucosa,mild eroisions and ulcerations in visible vessels    COLONOSCOPY N/A 7/1/2024    Procedure: COLONOSCOPY WITH ANESTHESIA;  Surgeon: Arsalan Lorenzo DO;  Location: [facility] ENDOSCOPY;  Service: Gastroenterology;  Laterality: N/A;  pre op constipation/diarrhea  post poor prep  pcp Del Shetty    COLONOSCOPY N/A 7/2/2024    Procedure: COLONOSCOPY WITH ANESTHESIA;  Surgeon: Agapito Christopher MD;  Location: [facility] ENDOSCOPY;  Service: Gastroenterology;  Laterality: N/A;  pre rectal bleeding  post poor prep  pcp Del Shetty MD    COLONOSCOPY W/ POLYPECTOMY  03/04/2014    Hyperplastic polyp    CORONARY ARTERY BYPASS GRAFT  10/2015    ENDOSCOPY  04/13/2011    Gastritis with hemorrhage    ENDOSCOPY N/A 05/05/2017    Normal exam    ENDOSCOPY N/A 02/11/2019    Gastritis    ENDOSCOPY N/A 09/01/2020    Non-erosive gastritis with hemorrhage    ENDOSCOPY N/A 02/10/2021    Esophagitis    ENDOSCOPY N/A 04/11/2024    There were esophageal mucosal changes suspicious for short-segment Low's esophagus present in the distal esophagus. The maximum longitudinal extent of these mucosal changes was 2 cm in length. Mucosa was biopsied with a  "cold forceps for histologyDistal esophagus, biopsies: Mild chronic active esophagogastritis. No evidence of intestinal metaplasia, dysplasia. Antrum, bx, Mild chronic gastritis    FOOT SURGERY Left     INCISION AND DRAINAGE OF WOUND Left 2007    spider bite       Family History  Family History   Problem Relation Age of Onset    Colon cancer Father     Heart disease Father     Colon cancer Sister     Colon polyps Sister     Alzheimer's disease Mother     Coronary artery disease Sister     Coronary artery disease Sister        Social History  Social History     Socioeconomic History    Marital status:    Tobacco Use    Smoking status: Former     Current packs/day: 0.00     Types: Cigarettes     Quit date:      Years since quittin.6    Smokeless tobacco: Never    Tobacco comments:     smoked in [employer]   Vaping Use    Vaping status: Never Used   Substance and Sexual Activity    Alcohol use: No    Drug use: No    Sexual activity: Defer         Review of Systems:  History obtained from chart review and the patient  General ROS: No fever or chills  Respiratory ROS: No cough, shortness of breath, wheezing  Cardiovascular ROS: no chest pain or dyspnea on exertion  Gastrointestinal ROS: No abdominal pain or melena  Genito-Urinary ROS: No dysuria or hematuria  14 point ROS reviewed with the patient and negative except as noted above and in the HPI unless unable to obtain.    Objective:  /75   Pulse 72   Temp 96.4 °F (35.8 °C) (Axillary)   Resp 16   Ht 182.9 cm (72\")   Wt 130 kg (286 lb 13.1 oz)   SpO2 97%   BMI 38.90 kg/m²     Intake/Output Summary (Last 24 hours) at 2024 1121  Last data filed at 2024 0500  Gross per 24 hour   Intake 1549.9 ml   Output --   Net 1549.9 ml     General: awake/alert x 2  HEENT: Normocephalic atraumatic. Head  Neck: Supple with no JVD or carotid bruits.  Chest:  clear to auscultation bilaterally without respiratory distress  CVS: regular rate and " rhythm  Abdominal: soft, nontender, normal bowel sounds  Extremities:  Large ulcer with necrotic areas of skin at plantar aspect of left foot  Skin: warm and dry without rash      Labs:    Results from last 7 days   Lab Units 08/01/24  0419 07/31/24  1849   WBC 10*3/mm3 12.83* 15.48*   HEMOGLOBIN g/dL 9.0* 9.8*   HEMATOCRIT % 28.3* 29.6*   PLATELETS 10*3/mm3 176 201       Results from last 7 days   Lab Units 08/01/24  0419 08/01/24  0210 08/01/24  0021 07/31/24  2030 07/31/24  1849   SODIUM mmol/L 138 136 138   < > 132*   POTASSIUM mmol/L 4.1 4.0 4.1   < > 4.5   CHLORIDE mmol/L 103 103 103   < > 97*   CO2 mmol/L 21.0* 19.0* 21.0*   < > 19.0*   BUN mg/dL 50* 51* 52*   < > 55*   CREATININE mg/dL 2.28* 2.39* 2.46*   < > 2.67*   CALCIUM mg/dL 8.8 8.7 8.8   < > 9.0   BILIRUBIN mg/dL  --   --   --   --  0.6   ALK PHOS U/L  --   --   --   --  101   ALT (SGPT) U/L  --   --   --   --  8   AST (SGOT) U/L  --   --   --   --  11   GLUCOSE mg/dL 370* 379* 370*   < > 704*   EGFR mL/min/1.73 30.5* 28.8* 27.8*   < > 25.2*    < > = values in this interval not displayed.         Radiology:   Imaging Results (Last 24 Hours)       Procedure Component Value Units Date/Time    CT Abdomen Pelvis With Contrast [[id number]] Collected: 08/01/24 0543     Updated: 08/01/24 0754    Narrative:      EXAMINATION: CT ABDOMEN PELVIS W CONTRAST-      7/31/2024 10:41 PM     HISTORY: abdominal distention with pain; M86.9-Osteomyelitis,  unspecified; E11.10-Type 2 diabetes mellitus with ketoacidosis without  coma; R79.89-Other specified abnormal findings of blood chemistry;  I48.91-Unspecified atrial fibrillation; R41.0-Disorientation,  unspecified     In order to have a CT radiation dose as low as reasonably achievable  Automated Exposure Control was utilized for adjustment of the mA and/or  KV according to patient size.     Total DLP = 1841.72 mGy.cm     The CT scan of the abdomen and pelvis is performed after intravenous  contrast enhancement.     The  images are acquired in axial plane and subsequent reconstruction in  coronal and sagittal planes.     Comparison is made with the previous study dated 6/27/2024.     The lung bases included in the study show a trace right and small left  basal pleural effusion. There are mild atelectatic changes at bilateral  bases left more than the right.     Limited visualized cardiomediastinal structures show atheromatous  changes of the coronary arteries. There is moderate cardiomegaly.     The liver and spleen are normal.     The gallbladder is surgically absent.     Fatty infiltrated pancreas seen. No focal abnormality. No ductal  dilatation. The adrenal glands are normal.     There is persistent bilateral significant perinephric fat infiltration  and thickening of the pararenal fascia which is similar to the previous  study. Bilateral nephrogram is normal and symmetrical. No calculi. No  hydronephrosis. There is a well-defined sharply marginated low density  exophytic mass from the lower pole of the right kidney measuring 4.4 cm  in diameter. CT density suggest a cyst. Limited visualized ureters are  normal and nondilated. The urinary bladder is well distended. No  intrinsic abnormality.     Prostate is not significantly enlarged.     There are small fat-containing inguinal hernias, right larger than the  left.     There is subcutaneous fat infiltration of the entire abdominal wall  extending into the lower extremity. This may represent a fluid  overload?.     The stomach is decompressed with moderate wall thickening. No focal  abnormality [place]. Duodenum is normal. Small bowel is nondistended and  nondilated. Appendix is surgically absent. There is significant large  volume stool throughout the colon. No finding to suggest obstruction.     Atheromatous changes of the abdominal aorta and iliac arteries. No  aneurysmal dilatation.     Moderately prominent nonspecific retroperitoneal para-aortic lymph nodes  predominantly in  the mid abdomen. A referenced left para iliac lymph  node, image #57 in series 2 and image #40 and series 3, measures 1.6 cm  in short axis. There is a large lymph node in the left lower anterior  pelvis/external iliac group measuring 1.3 cm in short axis. There are  moderately prominent inguinal lymph nodes. A left inguinal lymph node  measures 2 cm in short axis.     Images reviewed in bone window show chronic degenerative changes of the  lumbar spine. No acute bony abnormality.       Impression:      1. A significant perinephric fat stranding is similar to the previous  study and is a nonspecific finding. Possibility for chronic inflammatory  process/chronic pyelonephritis may not be excluded. Renal functions are  normal and symmetrical.  2. Fatty infiltration of the pancreas. No mass. No ductal dilatation.  3. Nonspecific abdominal, pelvic and inguinal lymphadenopathy. The  etiology and clinical significance is not certain. This appears  moderately more progressive since the previous study.  4. Diffuse subcutaneous fat infiltration of the abdominal wall extending  into the lower extremity may represent fluid overload?.  5. A significant large volume of stool in the colon without evidence of  obstruction. This may represent constipation.     The above study was initially reviewed and reported by [facility]. I do not  find any discrepancies.                                This report was signed and finalized on 8/1/2024 7:50 AM by Dr. Carlos Cutler MD.       CT Head Without Contrast [[id number]] Collected: 08/01/24 0524     Updated: 08/01/24 0531    Narrative:      EXAMINATION: CT HEAD WO CONTRAST-      7/31/2024 10:41 PM     HISTORY: altered mental status; M86.9-Osteomyelitis, unspecified;  E11.10-Type 2 diabetes mellitus with ketoacidosis without coma;  R79.89-Other specified abnormal findings of blood chemistry;  I48.91-Unspecified atrial fibrillation; R41.0-Disorientation,  unspecified     In order to have a  CT radiation dose as low as reasonably achievable  Automated Exposure Control was utilized for adjustment of the mA and/or  KV according to patient size.     Total DLP = 783.72 mGy.cm     The CT scan of the head is performed without intravenous contrast  enhancement.     The images are acquired in axial plane and subsequent reconstruction  with coronal and sagittal planes.     Comparison is made with the previous study dated 5/3/2024.     There is no evidence of a mass. There is no midline shift.     There is no evidence of intracranial hemorrhage or hematoma.     Moderately dilated ventricles, basal cisterns and the cortical sulci are  similar to the previous study representing chronic volume loss.     Areas of chronic white matter ischemia bilaterally are noted. The  gray-white matter differentiation is maintained.     Posterior fossa structures are normal.     The images reviewed in bone window show no acute displaced skull  fracture. A subtle nondisplaced fracture or lesion may be obscured due  to motion artifacts. Large mucous retention cyst is seen in the right  maxillary antrum. The remaining paranasal sinuses and mastoid air cells  are clear.       Impression:      1. No acute intracranial abnormality.  2. Chronic ischemic and atrophic changes.  3. Chronic maxillary sinusitis.     The above study was initially reviewed and reported by [facility]. I do not  find any discrepancies.                                      This report was signed and finalized on 8/1/2024 5:27 AM by Dr. Carlos Cutler MD.       XR Foot 3+ View Left [[id number]] Collected: 07/31/24 1941     Updated: 07/31/24 1950    Narrative:      EXAMINATION:  XR FOOT 3+ VW LEFT-  7/31/2024 6:22 PM     HISTORY: Heel wound.     COMPARISON: 3/26/2024.     TECHNIQUE: 3 views were obtained.     FINDINGS: There is a deep ulcer on the sole of the foot posteriorly. The  ulcer appears to be packed with some type of radiopaque material. On the  lateral  image, there may be a small area of bony erosion involving the  calcaneus just posterior to the plantar calcaneal spur. A small area of  osteomyelitis is not ruled out. There has been prior amputation of the  fifth toe and distal fifth metatarsal. There may have been prior  resection of the distal fourth metacarpal and a portion of the proximal  phalanx of the fourth toe versus chronic erosive change. The appearance  is stable. There is severe narrowing of the first MTP joint. There is  narrowing of some of the interphalangeal joints. There is some spurring  in the tarsal region and at the tarsal-metatarsal junction. There is  soft tissue swelling of the foot diffusely. There is a small curvilinear  foreign body in the soft tissues along the sole of the foot in the  second toe region in the proximal phalanx area. There is another small  linear foreign body in the soft tissues adjacent to the distal phalanx  of the great toe.          Impression:      1. Deep ulcer on the sole of the foot posteriorly near the calcaneus.  There is a questionable small area of bony erosion along the plantar  surface of the calcaneus just proximal to the plantar calcaneal spur.  Osteomyelitis cannot be ruled out. The soft tissue ulcer is packed with  some type of radiopaque material.  2. Linear foreign bodies projected over the soft tissues of the second  toe and first toe. Artifacts are also included in the differential.  3. Other chronic changes, as discussed.        This report was signed and finalized on 7/31/2024 7:47 PM by Dr. Raz Mendes MD.       XR Chest 1 View [[id number]] Collected: 07/31/24 1940     Updated: 07/31/24 1944    Narrative:      EXAMINATION:  XR CHEST 1 VW-  7/31/2024 6:22 PM     HISTORY: Altered mental status. Hypertension and diabetes.     COMPARISON: 6/28/2024.     TECHNIQUE: Single view AP image.     FINDINGS: There is hypoventilation with vascular crowding. There is mild  bronchial wall thickening,  "stable. There is no dense infiltrate or  effusion. Heart size is borderline. Prior heart bypass surgery. Prior  cervical fusion. No definite acute bony abnormality.          Impression:      1. Hypoventilation with vascular crowding.  2. Mild bronchial wall thickening, stable.           This report was signed and finalized on 7/31/2024 7:41 PM by Dr. Raz Mendes MD.               Culture:  No components found for: \"WOUNDCUL\", \"3\"  No components found for: \"CSFCUL\", \"3\"  No components found for: \"BC\", \"3\"  No components found for: \"URINECUL\", \"3\"      Assessment   1.  Acute kidney injury/worsening.  2.  Acute tubular necrosis.  3.  Stage IIIb chronic kidney disease baseline.  4.  Type II diabetic nephropathy.  5.  Sepsis related to diabetic foot ulcer.  6.  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  Morbid abdominal obesity.  8.  Anemia of chronic kidney disease.  9.  Poorly controlled type 2 diabetes.    Plan:  1.  Continue IV fluid.  2.  Urinary electrolytes/UACR.  3.  Close monitoring of Vanco level  4.  Continue to monitor renal function.  5.  Baseline iron studies, may need RYANNE.  Also get serum PTH level.  6.  Plan was discussed with the patient and critical care nurse    Thank you for the consult, we appreciate the opportunity to provide care to your patients.  Feel free to contact me if I can be of any further assistance.      Brian Lomas MD  8/1/2024  11:21 CDT    Electronically signed by Brian Lomas MD at 08/01/24 1144       Julia Adrian MD at 08/01/24 1030        Consult Orders    1. Inpatient Infectious Diseases Consult [[id number]] ordered by Lamine Figueroa APRN at 08/01/24 0053                    Julia Adrian MD  Physician  Infectious Disease     Progress Notes     Addendum     Date of Service: 08/01/24 0749  Creation Time: 08/01/24 0749     Expand All Collapse All    INFECTIOUS DISEASES CONSULT NOTE     Patient:  Erick Luong 69 y.o. male  ROOM # C004/1  Date of Birth: " "1955  MRN: [id number]  CSN:    [id number]  Admit date: 7/31/2024             Admitting Physician: Keren Jamison MD  Primary Care Physician: Del Shetty MD  REFERRING PROVIDER: Abebe Garzon DO     Consults     REASON FOR CONSULTATION :hx MRSA bactermia/ESBL, DC ulcer         HISTORY OF PRESENT ILLNESS: The patient is a 61-year-old uncontrolled diabetic male who have seen previously with diabetic foot infection.  He primarily gets his podiatry care with Dr. Gomes in [place].     I last saw him in May as a follow-up for MRSA bacteremia secondary to infected left foot and associated cellulitis.     He says he normally sees Dr. Gomes every Monday but did not go last Monday but could not tell me why.     Patient was apparently confused and found wandering in his garage by his son.  In the emergency department he was found to have his wounds on his left foot caked with hair and dirt.     Patient also tells me his glucose has been out of control \"for a while\" and when I tried to pin him down he said for about a month.  Glucose was 700 in the emergency department.     He is remained hemodynamically stable in the CCU and has been given Zosyn and vancomycin.  Patient denies any dysuria.  He does state he has urinary frequency but that that is not uncommon.  He did complain of some abdominal pain and CT scan of the abdomen pelvis has been completed.        Medical History        Past Medical History:   Diagnosis Date    Arthritis      Autonomic disease      CAD (coronary artery disease) 02/06/2017    Cervical radiculopathy 09/16/2021    Chronic constipation with acute exaccerbation 05/10/2021    Coronary artery disease      Degeneration of cervical intervertebral disc 08/11/2021    Diabetes mellitus      Diabetic foot ulcer 08/31/2020    Diabetic polyneuropathy associated with type 2 diabetes mellitus 01/18/2021    Elevated cholesterol      Gastroesophageal reflux disease 05/13/2019    Headache      HTN " (hypertension), benign 05/03/2017    Hyperlipidemia      Hypertension      Mixed hyperlipidemia 02/07/2017    Multiple lung nodules 01/26/2020     5mm, 9 mm RLL identified 1/2020, not present 10/2019.    Myocardial infarction      Osteomyelitis 01/22/2020    Osteomyelitis of fifth toe of right foot 10/07/2019    Pancreatitis      Persistent insomnia 01/20/2020    Renal disorder      Sleep apnea 02/06/2017    Sleep apnea with use of continuous positive airway pressure (CPAP)       NON-COMPLIANT    Slow transit constipation 01/16/2019    Spinal stenosis in cervical region 09/16/2021    Vitamin D deficiency 03/02/2021         Surgical History         Past Surgical History:   Procedure Laterality Date    ABDOMINAL SURGERY        AMPUTATION FOOT / TOE Left 10/2021     5th digit     ANTERIOR CERVICAL DISCECTOMY W/ FUSION N/A 08/05/2022     Procedure: CERVICAL DISCECTOMY ANTERIOR WITH FUSION C5-6 with possible plating of C5-7 with neuromonitoring and 1 c-arm;  Surgeon: Karel Soliz MD;  Location: [facility] OR;  Service: Neurosurgery;  Laterality: N/A;    APPENDECTOMY        BACK SURGERY        CARDIAC CATHETERIZATION Left 02/08/2021     Procedure: Left Heart Cath w poss intervention left anatomical snuff box acess;  Surgeon: Omkar Charles DO;  Location: [facility] CATH INVASIVE LOCATION;  Service: Cardiology;  Laterality: Left;    CARDIAC SURGERY        CATARACT EXTRACTION        CERVICAL SPINE SURGERY        COLONOSCOPY N/A 01/31/2017     Normal exam repeat in 5 years    COLONOSCOPY N/A 02/11/2019     Mild acute inflammation    COLONOSCOPY N/A 04/07/2024     2 areas at 10 and 20 cm with friability ulceration 2 clips placed at 20 cm and 4 clips at 10 cm poor prep normal mucosa,mild eroisions and ulcerations in visible vessels    COLONOSCOPY N/A 7/1/2024     Procedure: COLONOSCOPY WITH ANESTHESIA;  Surgeon: Arsalan Lorenzo DO;  Location: [facility] ENDOSCOPY;  Service: Gastroenterology;  Laterality: N/A;  pre op  constipation/diarrhea  post poor prep  pcp Del Shetty    COLONOSCOPY N/A 2024     Procedure: COLONOSCOPY WITH ANESTHESIA;  Surgeon: Agapito Christopher MD;  Location: [facility] ENDOSCOPY;  Service: Gastroenterology;  Laterality: N/A;  pre rectal bleeding  post poor prep  pcp Del Shetty MD    COLONOSCOPY W/ POLYPECTOMY   2014     Hyperplastic polyp    CORONARY ARTERY BYPASS GRAFT   10/2015    ENDOSCOPY   2011     Gastritis with hemorrhage    ENDOSCOPY N/A 2017     Normal exam    ENDOSCOPY N/A 2019     Gastritis    ENDOSCOPY N/A 2020     Non-erosive gastritis with hemorrhage    ENDOSCOPY N/A 02/10/2021     Esophagitis    ENDOSCOPY N/A 2024     There were esophageal mucosal changes suspicious for short-segment Low's esophagus present in the distal esophagus. The maximum longitudinal extent of these mucosal changes was 2 cm in length. Mucosa was biopsied with a cold forceps for histologyDistal esophagus, biopsies: Mild chronic active esophagogastritis. No evidence of intestinal metaplasia, dysplasia. Antrum, bx, Mild chronic gastritis    FOOT SURGERY Left      INCISION AND DRAINAGE OF WOUND Left 2007     spider bite               Family History   Problem Relation Age of Onset    Colon cancer Father      Heart disease Father      Colon cancer Sister      Colon polyps Sister      Alzheimer's disease Mother      Coronary artery disease Sister      Coronary artery disease Sister        Social History   Social History            Socioeconomic History    Marital status:    Tobacco Use    Smoking status: Former       Current packs/day: 0.00       Types: Cigarettes       Quit date:        Years since quittin.6    Smokeless tobacco: Never    Tobacco comments:       smoked in highschool   Vaping Use    Vaping status: Never Used   Substance and Sexual Activity    Alcohol use: No    Drug use: No    Sexual activity: Defer            Current Scheduled Medications:      Scheduled Medication   [START ON 8/2/2024] Chlorhexidine Gluconate Cloth, 1 Application, Topical, Q24H  enoxaparin, 1 mg/kg, Subcutaneous, Q12H  insulin regular, 3-14 Units, Subcutaneous, Q6H  mupirocin, 1 Application, Each Nare, BID  piperacillin-tazobactam, 3.375 g, Intravenous, Q8H  senna-docusate sodium, 2 tablet, Oral, BID  sodium chloride, 10 mL, Intravenous, Q12H  sodium chloride, 10 mL, Intravenous, Q12H  vancomycin, 750 mg, Intravenous, Q24H                     Current PRN Medications:    PRN Medication     senna-docusate sodium **AND** polyethylene glycol **AND** bisacodyl **AND** bisacodyl    Calcium Replacement - Follow Nurse / BPA Driven Protocol    dextrose    dextrose    dextrose    dextrose 5 % and sodium chloride 0.45 %    dextrose 5 % and sodium chloride 0.45 % with KCl 20 mEq/L    dextrose 5 % and sodium chloride 0.45 % with KCl 40 mEq/L    dextrose 5 % and sodium chloride 0.9 %    dextrose 5 % and sodium chloride 0.9 % with KCl 20 mEq    dextrose 5% and sodium chloride 0.9% with KCl 40 mEq/L    glucagon (human recombinant)    Magnesium Standard Dose Replacement - Follow Nurse / BPA Driven Protocol    nitroglycerin    Pharmacy to Dose enoxaparin (LOVENOX)    Pharmacy to dose vancomycin    Pharmacy to Dose Zosyn    Phosphorus Replacement - Follow Nurse / BPA Driven Protocol    Potassium Replacement - Follow Nurse / BPA Driven Protocol    sodium chloride 0.45 % 1,000 mL with potassium chloride 40 mEq infusion    sodium chloride    sodium chloride 0.45 % with KCl 20 mEq    sodium chloride    sodium chloride    sodium chloride    sodium chloride    sodium chloride    sodium chloride 0.9 % with KCl 20 mEq    sodium chloride 0.9 % with KCl 40 mEq/L             Infusion Medications   dextrose 5 % and sodium chloride 0.45 %, 150 mL/hr  dextrose 5 % and sodium chloride 0.45 % with KCl 20 mEq/L, 150 mL/hr  dextrose 5 % and sodium chloride 0.45 % with KCl 40 mEq/L, 150 mL/hr  dextrose 5 % and sodium  "chloride 0.9 %, 150 mL/hr  dextrose 5 % and sodium chloride 0.9 % with KCl 20 mEq, 150 mL/hr  dextrose 5% and sodium chloride 0.9% with KCl 40 mEq/L, 150 mL/hr  dilTIAZem, 5-15 mg/hr, Last Rate: Stopped (24)  Pharmacy to Dose enoxaparin (LOVENOX),   Pharmacy to dose vancomycin,   Pharmacy to Dose Zosyn,   sodium chloride 0.45 % 1,000 mL with potassium chloride 40 mEq infusion, 250 mL/hr  sodium chloride, 250 mL/hr  sodium chloride 0.45 % with KCl 20 mEq, 250 mL/hr, Last Rate: Stopped (24)  sodium chloride, 250 mL/hr  sodium chloride 0.9 % with KCl 20 mEq, 250 mL/hr  sodium chloride 0.9 % with KCl 40 mEq/L, 250 mL/hr                  Allergies         Allergies   Allergen Reactions    Cefepime Hives and Anaphylaxis    Bactrim [Sulfamethoxazole-Trimethoprim] Other (See Comments)       \"RENAL FAILURE\"    Vancomycin Itching    Zolpidem Mental Status Change       \"makes him crazy\"    Metronidazole Rash                  Vital Signs:  /75   Pulse 77   Temp 97.6 °F (36.4 °C) (Oral)   Resp 20   Ht 182.9 cm (72\")   Wt 130 kg (286 lb 13.1 oz)   SpO2 100%   BMI 38.90 kg/m²   Temp (24hrs), Av.1 °F (36.7 °C), Min:97.6 °F (36.4 °C), Max:99.5 °F (37.5 °C)        Physical Exam  General: Patient is a 69-year-old gentleman lying in bed appearing in no acute distress  HEENT: Sclera anicteric.  There is no conjunctival petechiae  Heart: Irregularly irregular  Lungs: Clear bilaterally  Abdomen: Soft, no rebound or guarding.  No mass appreciated  Extremities: See photos.  Patient does have areas of excoriations and some eschar formation on both toes.  Patient does have a large ulcer as seen below on photo however he also has an area of pale nonviable appearing skin.  I do not appreciate any necrosis.  I did not palpate any fluctuance or crepitus.                Results Review:    I reviewed the patient's new clinical results.     Lab Results:     CBC:   Lab " Results  Lab 07/25/24 1018 07/31/24 1849 08/01/24 0419  WBC 8.92 15.48* 12.83*  HEMOGLOBIN 11.2* 9.8* 9.0*  HEMATOCRIT 34.9* 29.6* 28.3*  PLATELETS 223 201 176        AutoDiff:   Lab Results  Lab 07/25/24 1018 07/31/24 1849 08/01/24 0419  NEUTROPHIL % 55.0 88.7* 79.2*  LYMPHOCYTE % 28.4 2.9* 10.1*  MONOCYTES % 8.7 7.1 9.6  EOSINOPHIL % 7.0* 0.1* 0.2*  BASOPHIL % 0.7 0.3 0.3  NEUTROS ABS 4.91 13.73* 10.16*  LYMPHS ABS 2.53 0.45* 1.29  MONOS ABS 0.78 1.10* 1.23*  EOS ABS 0.62* 0.01 0.03  BASOS ABS 0.06 0.05 0.04        Manual Diff:    Lab Results  Lab 07/25/24 1018 07/31/24 1849 08/01/24 0419  NEUTROS ABS 4.91 13.73* 10.16*        CMP:   Lab Results  Lab 07/25/24 1018 07/31/24 1849 07/31/24  2030 08/01/24  0021 08/01/24 0210 08/01/24 0419  SODIUM 144 132*   < > 138 136 138  POTASSIUM 3.4* 4.5   < > 4.1 4.0 4.1  CHLORIDE 106 97*   < > 103 103 103  CO2 27.0 19.0*   < > 21.0* 19.0* 21.0*  BUN 43* 55*   < > 52* 51* 50*  CREATININE 2.24* 2.67*   < > 2.46* 2.39* 2.28*  CALCIUM 9.2 9.0   < > 8.8 8.7 8.8  BILIRUBIN 0.3 0.6  --   --   --   --   ALK PHOS 84 101  --   --   --   --   ALT (SGPT) 7 8  --   --   --   --   AST (SGOT) 11 11  --   --   --   --   GLUCOSE 59* 704*   < > 370* 379* 370*   < > = values in this interval not displayed.        Lab Results (last 72 hours)        Procedure Component Value Units Date/Time    POC Glucose Once [[id number]]  (Abnormal) Collected: 08/01/24 0556    Specimen: Blood Updated: 08/01/24 0607      Glucose 338 mg/dL          Comment: : [id number] Thee SamiMeter ID: [id number]      Basic Metabolic Panel [[id number]]  (Abnormal) Collected: 08/01/24 0419    Specimen: Blood Updated: 08/01/24 0515      Glucose 370 mg/dL        BUN 50 mg/dL        Creatinine 2.28 mg/dL        Sodium 138 mmol/L        Potassium 4.1 mmol/L        Chloride 103 mmol/L        CO2 21.0 mmol/L        Calcium 8.8 mg/dL        BUN/Creatinine Ratio 21.9      Anion Gap 14.0 mmol/L         eGFR 30.5 mL/min/1.73      Narrative:      GFR Normal >60  Chronic Kidney Disease <60  Kidney Failure <15       Magnesium [[id number]]  (Abnormal) Collected: 08/01/24 0419    Specimen: Blood Updated: 08/01/24 0515      Magnesium 2.5 mg/dL      Phosphorus [[id number]]  (Normal) Collected: 08/01/24 0419    Specimen: Blood Updated: 08/01/24 0512      Phosphorus 3.6 mg/dL      CBC & Differential [[id number]]  (Abnormal) Collected: 08/01/24 0419    Specimen: Blood Updated: 08/01/24 0452    Narrative:      The following orders were created for panel order CBC & Differential.  Procedure                               Abnormality         Status                     ---------                               -----------         ------                     CBC Auto Differential[[id number]]        Abnormal            Final result                  Please view results for these tests on the individual orders.    CBC Auto Differential [[id number]]  (Abnormal) Collected: 08/01/24 0419    Specimen: Blood Updated: 08/01/24 0452      WBC 12.83 10*3/mm3        RBC 3.38 10*6/mm3        Hemoglobin 9.0 g/dL        Hematocrit 28.3 %        MCV 83.7 fL        MCH 26.6 pg        MCHC 31.8 g/dL        RDW 14.5 %        RDW-SD 44.3 fl        MPV 12.7 fL        Platelets 176 10*3/mm3        Neutrophil % 79.2 %        Lymphocyte % 10.1 %        Monocyte % 9.6 %        Eosinophil % 0.2 %        Basophil % 0.3 %        Immature Grans % 0.6 %        Neutrophils, Absolute 10.16 10*3/mm3        Lymphocytes, Absolute 1.29 10*3/mm3        Monocytes, Absolute 1.23 10*3/mm3        Eosinophils, Absolute 0.03 10*3/mm3        Basophils, Absolute 0.04 10*3/mm3        Immature Grans, Absolute 0.08 10*3/mm3        nRBC 0.0 /100 WBC      MRSA Screen, PCR (Inpatient) - Swab, Nares [[id number]]  (Normal) Collected: 08/01/24 0206    Specimen: Swab from Nares Updated: 08/01/24 0346      MRSA PCR No MRSA Detected    Narrative:      The negative predictive value of this  diagnostic test is high and should only be used to consider de-escalating anti-MRSA therapy. A positive result may indicate colonization with MRSA and must be correlated clinically.    Basic Metabolic Panel [[id number]]  (Abnormal) Collected: 08/01/24 0210    Specimen: Blood Updated: 08/01/24 0253      Glucose 379 mg/dL        BUN 51 mg/dL        Creatinine 2.39 mg/dL        Sodium 136 mmol/L        Potassium 4.0 mmol/L        Chloride 103 mmol/L        CO2 19.0 mmol/L        Calcium 8.7 mg/dL        BUN/Creatinine Ratio 21.3      Anion Gap 14.0 mmol/L        eGFR 28.8 mL/min/1.73      Narrative:      GFR Normal >60  Chronic Kidney Disease <60  Kidney Failure <15       STAT Lactic Acid, Reflex [[id number]]  (Normal) Collected: 08/01/24 0210    Specimen: Blood Updated: 08/01/24 0250      Lactate 1.3 mmol/L      POC Glucose Once [[id number]]  (Abnormal) Collected: 08/01/24 0210    Specimen: Blood Updated: 08/01/24 0221      Glucose 360 mg/dL          Comment: : [id number] Thee SamiMeter ID: [id number]      Basic Metabolic Panel [[id number]]  (Abnormal) Collected: 08/01/24 0021    Specimen: Blood Updated: 08/01/24 0053      Glucose 370 mg/dL        BUN 52 mg/dL        Creatinine 2.46 mg/dL        Sodium 138 mmol/L        Potassium 4.1 mmol/L          Comment: Specimen hemolyzed.  Result may be falsely elevated.        Chloride 103 mmol/L        CO2 21.0 mmol/L        Calcium 8.8 mg/dL        BUN/Creatinine Ratio 21.1      Anion Gap 14.0 mmol/L        eGFR 27.8 mL/min/1.73      Narrative:      GFR Normal >60  Chronic Kidney Disease <60  Kidney Failure <15       Phosphorus [[id number]]  (Normal) Collected: 08/01/24 0021    Specimen: Blood Updated: 08/01/24 0043      Phosphorus 3.5 mg/dL      Magnesium [[id number]]  (Normal) Collected: 08/01/24 0021    Specimen: Blood Updated: 08/01/24 0042      Magnesium 2.4 mg/dL      POC Glucose Once [[id number]]  (Abnormal) Collected: 08/01/24 0010    Specimen: Blood Updated:  08/01/24 0022      Glucose 353 mg/dL          Comment: : [id number] Thee SamiMeter ID: [id number]      POC Glucose Once [[id number]]  (Abnormal) Collected: 07/31/24 2323    Specimen: Blood Updated: 07/31/24 2334      Glucose 237 mg/dL          Comment: : [id number] Thee SamiMeter ID: [id number]      STAT Lactic Acid, Reflex [[id number]]  (Abnormal) Collected: 07/31/24 2240    Specimen: Blood Updated: 07/31/24 2300      Lactate 2.2 mmol/L      POC Glucose Once [[id number]]  (Abnormal) Collected: 07/31/24 2238    Specimen: Blood Updated: 07/31/24 2250      Glucose 369 mg/dL          Comment: : [id number] Thee SamiMeter ID: [id number]      POC Glucose Once [[id number]]  (Abnormal) Collected: 07/31/24 2124    Specimen: Blood Updated: 07/31/24 2136      Glucose 543 mg/dL          Comment: : [id number] Zafar AbigailMeter ID: [id number]      Basic Metabolic Panel [[id number]]  (Abnormal) Collected: 07/31/24 2030    Specimen: Blood Updated: 07/31/24 2111      Glucose 653 mg/dL        BUN 54 mg/dL        Creatinine 2.64 mg/dL        Sodium 133 mmol/L        Potassium 4.4 mmol/L        Chloride 98 mmol/L        CO2 20.0 mmol/L        Calcium 8.9 mg/dL        BUN/Creatinine Ratio 20.5      Anion Gap 15.0 mmol/L        eGFR 25.6 mL/min/1.73      Narrative:      GFR Normal >60  Chronic Kidney Disease <60  Kidney Failure <15       Phosphorus [[id number]]  (Normal) Collected: 07/31/24 1849    Specimen: Blood Updated: 07/31/24 2110      Phosphorus 2.7 mg/dL      Magnesium [[id number]]  (Normal) Collected: 07/31/24 1849    Specimen: Blood Updated: 07/31/24 2110      Magnesium 2.2 mg/dL      Osmolality, Serum [[id number]]  (Abnormal) Collected: 07/31/24 2030    Specimen: Blood Updated: 07/31/24 2103      Osmolality 329 mOsm/kg      High Sensitivity Troponin T 2Hr [[id number]]  (Abnormal) Collected: 07/31/24 2030    Specimen: Blood Updated: 07/31/24 2058      HS Troponin T 353 ng/L        Troponin T Delta 11 ng/L       Narrative:      High Sensitive Troponin T Reference Range:  <14.0 ng/L- Negative Female for AMI  <22.0 ng/L- Negative Male for AMI  >=14 - Abnormal Female indicating possible myocardial injury.  >=22 - Abnormal Male indicating possible myocardial injury.   Clinicians would have to utilize clinical acumen, EKG, Troponin, and serial changes to determine if it is an Acute Myocardial Infarction or myocardial injury due to an underlying chronic condition.          Magnesium [[id number]]  (Normal) Collected: 07/31/24 2030    Specimen: Blood Updated: 07/31/24 2056      Magnesium 2.3 mg/dL      Phosphorus [[id number]]  (Normal) Collected: 07/31/24 2030    Specimen: Blood Updated: 07/31/24 2054      Phosphorus 3.1 mg/dL      POC Glucose Once [[id number]]  (Abnormal) Collected: 07/31/24 2021    Specimen: Blood Updated: 07/31/24 2033      Glucose 575 mg/dL          Comment: [id number]      Blood Gas, Venous - [[id number]]  (Abnormal) Collected: 07/31/24 2021    Specimen: Venous Blood Updated: 07/31/24 2021      Site OTHER      pH, Venous 7.410 pH Units        pCO2, Venous 32.2 mm Hg          Comment: 84 Value below reference range        pO2, Venous 55.6 mm Hg          Comment: 83 Value above reference range        HCO3, Venous 20.4 mmol/L          Comment: 84 Value below reference range        Base Excess, Venous -3.6 mmol/L          Comment: 84 Value below reference range        O2 Saturation, Venous 89.9 %          Comment: 83 Value above reference range        Temperature 37.0      Barometric Pressure for Blood Gas 751 mmHg        Modality Room Air      FIO2 21 %        Ventilator Mode NA      Collected by [id number]        Comment: Meter: [id number]     :  [email or web address]      Hemoglobin A1c [[id number]]  (Abnormal) Collected: 07/31/24 1849    Specimen: Blood Updated: 07/31/24 2004      Hemoglobin A1C 12.60 %      Narrative:      Hemoglobin A1C Ranges:     Increased Risk for  Diabetes  5.7% to 6.4%  Diabetes                     >= 6.5%  Diabetic Goal                < 7.0%    Comprehensive Metabolic Panel [[id number]]  (Abnormal) Collected: 07/31/24 1849    Specimen: Blood Updated: 07/31/24 1944      Glucose 704 mg/dL        BUN 55 mg/dL        Creatinine 2.67 mg/dL        Sodium 132 mmol/L        Potassium 4.5 mmol/L        Chloride 97 mmol/L        CO2 19.0 mmol/L        Calcium 9.0 mg/dL        Total Protein 6.7 g/dL        Albumin 3.5 g/dL        ALT (SGPT) 8 U/L        AST (SGOT) 11 U/L        Alkaline Phosphatase 101 U/L        Total Bilirubin 0.6 mg/dL        Globulin 3.2 gm/dL        A/G Ratio 1.1 g/dL        BUN/Creatinine Ratio 20.6      Anion Gap 16.0 mmol/L        eGFR 25.2 mL/min/1.73      Narrative:      GFR Normal >60  Chronic Kidney Disease <60  Kidney Failure <15       C-reactive Protein [[id number]]  (Abnormal) Collected: 07/31/24 1849    Specimen: Blood Updated: 07/31/24 1932      C-Reactive Protein 17.04 mg/dL      High Sensitivity Troponin T [[id number]]  (Abnormal) Collected: 07/31/24 1849    Specimen: Blood Updated: 07/31/24 1929      HS Troponin T 342 ng/L      Narrative:      High Sensitive Troponin T Reference Range:  <14.0 ng/L- Negative Female for AMI  <22.0 ng/L- Negative Male for AMI  >=14 - Abnormal Female indicating possible myocardial injury.  >=22 - Abnormal Male indicating possible myocardial injury.   Clinicians would have to utilize clinical acumen, EKG, Troponin, and serial changes to determine if it is an Acute Myocardial Infarction or myocardial injury due to an underlying chronic condition.          Lactic Acid, Plasma [[id number]]  (Abnormal) Collected: 07/31/24 1849    Specimen: Blood Updated: 07/31/24 1928      Lactate 2.4 mmol/L      Urinalysis With Culture If Indicated - Straight Cath [[id number]]  (Abnormal) Collected: 07/31/24 1851    Specimen: Urine from Straight Cath Updated: 07/31/24 1924      Color, UA Yellow      Appearance, UA Clear       pH, UA <=5.0      Specific Gravity, UA 1.028      Glucose, UA >=1000 mg/dL (3+)      Ketones, UA Trace      Bilirubin, UA Negative      Blood, UA Trace      Protein,  mg/dL (2+)      Leuk Esterase, UA Negative      Nitrite, UA Positive      Urobilinogen, UA 0.2 E.U./dL    Narrative:      In absence of clinical symptoms, the presence of pyuria, bacteria, and/or nitrites on the urinalysis result does not correlate with infection.    Urinalysis, Microscopic Only - Straight Cath [[id number]]  (Abnormal) Collected: 07/31/24 1851    Specimen: Urine from Straight Cath Updated: 07/31/24 1924      RBC, UA 3-5 /HPF        WBC, UA 6-10 /HPF        Bacteria, UA 2+ /HPF        Squamous Epithelial Cells, UA 0-2 /HPF        Hyaline Casts, UA None Seen /LPF        Methodology Manual Light Microscopy    Urine Culture - Urine, Straight Cath [[id number]] Collected: 07/31/24 1851    Specimen: Urine from Straight Cath Updated: 07/31/24 1924    Acetone [[id number]]  (Abnormal) Collected: 07/31/24 1849    Specimen: Blood Updated: 07/31/24 1910      Acetone Small    Sedimentation Rate [[id number]]  (Abnormal) Collected: 07/31/24 1849    Specimen: Blood Updated: 07/31/24 1910      Sed Rate 57 mm/hr      CBC & Differential [[id number]]  (Abnormal) Collected: 07/31/24 1849    Specimen: Blood Updated: 07/31/24 1906    Narrative:      The following orders were created for panel order CBC & Differential.  Procedure                               Abnormality         Status                     ---------                               -----------         ------                     CBC Auto Differential[[id number]]        Abnormal            Final result                  Please view results for these tests on the individual orders.    CBC Auto Differential [[id number]]  (Abnormal) Collected: 07/31/24 1849    Specimen: Blood Updated: 07/31/24 1906      WBC 15.48 10*3/mm3        RBC 3.55 10*6/mm3        Hemoglobin 9.8 g/dL        Hematocrit 29.6 %         MCV 83.4 fL        MCH 27.6 pg        MCHC 33.1 g/dL        RDW 14.5 %        RDW-SD 43.8 fl        MPV 13.1 fL        Platelets 201 10*3/mm3        Neutrophil % 88.7 %        Lymphocyte % 2.9 %        Monocyte % 7.1 %        Eosinophil % 0.1 %        Basophil % 0.3 %        Immature Grans % 0.9 %        Neutrophils, Absolute 13.73 10*3/mm3        Lymphocytes, Absolute 0.45 10*3/mm3        Monocytes, Absolute 1.10 10*3/mm3        Eosinophils, Absolute 0.01 10*3/mm3        Basophils, Absolute 0.05 10*3/mm3        Immature Grans, Absolute 0.14 10*3/mm3      Blood Culture - Blood, Hand, Left [[id number]] Collected: 07/31/24 1849    Specimen: Blood from Hand, Left Updated: 07/31/24 1902    Blood Culture - Blood, Arm, Left [[id number]] Collected: 07/31/24 1849    Specimen: Blood from Arm, Left Updated: 07/31/24 1902              Estimated Creatinine Clearance: 43.2 mL/min (A) (by C-G formula based on SCr of 2.28 mg/dL (H)).     Culture Results:     Microbiology Results (last 10 days)        Procedure Component Value - Date/Time    MRSA Screen, PCR (Inpatient) - Swab, Nares [[id number]]  (Normal) Collected: 08/01/24 0206    Lab Status: Final result Specimen: Swab from Nares Updated: 08/01/24 0346      MRSA PCR No MRSA Detected    Narrative:      The negative predictive value of this diagnostic test is high and should only be used to consider de-escalating anti-MRSA therapy. A positive result may indicate colonization with MRSA and must be correlated clinically.     Radiology:      Foot x-rays reviewed.  Noted areas of concern for foreign body versus artifact.  No gas in soft tissue appreciated.        Imaging Results (Last 72 Hours)         Procedure Component Value Units Date/Time     CT Head Without Contrast [[id number]] Collected: 08/01/24 0524       Updated: 08/01/24 0531     Narrative:       EXAMINATION: CT HEAD WO CONTRAST-      7/31/2024 10:41 PM     HISTORY: altered mental status; M86.9-Osteomyelitis,  unspecified;  E11.10-Type 2 diabetes mellitus with ketoacidosis without coma;  R79.89-Other specified abnormal findings of blood chemistry;  I48.91-Unspecified atrial fibrillation; R41.0-Disorientation,  unspecified     In order to have a CT radiation dose as low as reasonably achievable  Automated Exposure Control was utilized for adjustment of the mA and/or  KV according to patient size.     Total DLP = 783.72 mGy.cm     The CT scan of the head is performed without intravenous contrast  enhancement.     The images are acquired in axial plane and subsequent reconstruction  with coronal and sagittal planes.     Comparison is made with the previous study dated 5/3/2024.     There is no evidence of a mass. There is no midline shift.     There is no evidence of intracranial hemorrhage or hematoma.     Moderately dilated ventricles, basal cisterns and the cortical sulci are  similar to the previous study representing chronic volume loss.     Areas of chronic white matter ischemia bilaterally are noted. The  gray-white matter differentiation is maintained.     Posterior fossa structures are normal.     The images reviewed in bone window show no acute displaced skull  fracture. A subtle nondisplaced fracture or lesion may be obscured due  to motion artifacts. Large mucous retention cyst is seen in the right  maxillary antrum. The remaining paranasal sinuses and mastoid air cells  are clear.        Impression:       1. No acute intracranial abnormality.  2. Chronic ischemic and atrophic changes.  3. Chronic maxillary sinusitis.     The above study was initially reviewed and reported by [facility]. I do not  find any discrepancies.                                      This report was signed and finalized on 8/1/2024 5:27 AM by Dr. Carlos Cutler MD.        CT Abdomen Pelvis With Contrast [[id number]] Resulted: 08/01/24 0737       Updated: 07/31/24 7535     XR Foot 3+ View Left [[id number]] Collected: 07/31/24 1941        Updated: 07/31/24 1950     Narrative:       EXAMINATION:  XR FOOT 3+ VW LEFT-  7/31/2024 6:22 PM     HISTORY: Heel wound.     COMPARISON: 3/26/2024.     TECHNIQUE: 3 views were obtained.     FINDINGS: There is a deep ulcer on the sole of the foot posteriorly. The  ulcer appears to be packed with some type of radiopaque material. On the  lateral image, there may be a small area of bony erosion involving the  calcaneus just posterior to the plantar calcaneal spur. A small area of  osteomyelitis is not ruled out. There has been prior amputation of the  fifth toe and distal fifth metatarsal. There may have been prior  resection of the distal fourth metacarpal and a portion of the proximal  phalanx of the fourth toe versus chronic erosive change. The appearance  is stable. There is severe narrowing of the first MTP joint. There is  narrowing of some of the interphalangeal joints. There is some spurring  in the tarsal region and at the tarsal-metatarsal junction. There is  soft tissue swelling of the foot diffusely. There is a small curvilinear  foreign body in the soft tissues along the sole of the foot in the  second toe region in the proximal phalanx area. There is another small  linear foreign body in the soft tissues adjacent to the distal phalanx  of the great toe.           Impression:       1. Deep ulcer on the sole of the foot posteriorly near the calcaneus.  There is a questionable small area of bony erosion along the plantar  surface of the calcaneus just proximal to the plantar calcaneal spur.  Osteomyelitis cannot be ruled out. The soft tissue ulcer is packed with  some type of radiopaque material.  2. Linear foreign bodies projected over the soft tissues of the second  toe and first toe. Artifacts are also included in the differential.  3. Other chronic changes, as discussed.        This report was signed and finalized on 7/31/2024 7:47 PM by Dr. Raz Mendes MD.        XR Chest 1 View [[id number]]  Collected: 07/31/24 1940       Updated: 07/31/24 1944     Narrative:       EXAMINATION:  XR CHEST 1 VW-  7/31/2024 6:22 PM     HISTORY: Altered mental status. Hypertension and diabetes.     COMPARISON: 6/28/2024.     TECHNIQUE: Single view AP image.     FINDINGS: There is hypoventilation with vascular crowding. There is mild  bronchial wall thickening, stable. There is no dense infiltrate or  effusion. Heart size is borderline. Prior heart bypass surgery. Prior  cervical fusion. No definite acute bony abnormality.           Impression:       1. Hypoventilation with vascular crowding.  2. Mild bronchial wall thickening, stable.           This report was signed and finalized on 7/31/2024 7:41 PM by Dr. Raz Mendes MD.                      HOSPITAL PROBLEM LIST:       DKA, type 2, not at goal        IMPRESSION:  Confusion on presentation-mental status back to baseline.  Patient with glucose in the 700s on admission.  Likely contributing to his altered mental status.  Suspect infection involving left foot and certainly has portal of entry for bacteria.  Diabetic foot infection with history of osteomyelitis persistent open wound on heel and concerning area of nonviable skin laterally (no rios necrosis) associated with cellulitis.  I had not seen the x-rays of the foot when I examined the patient so did not specifically look at the areas of concern for foreign body  Uncontrolled diabetes mellitus with hemoglobin A1c greater than 12  Complains of abdominal pain-patient has had that off and on previously and has been seen by GI during his admissions.  There is documented colonoscopy from July 2 however patient had inadequate prep.  Leukocytosis-improved  Chronic kidney disease stage IIIb with acute kidney injury        RECOMMENDATION:   Continue vancomycin  Continue zosyn  Follow-up blood cultures  Reevaluate left foot more closely given concerns for foreign body versus artifact on x-rays  Await wound care  evaluation-need to monitor lateral ankle closely.  Heel wound appears to be chronic and fairly clean  Diabetes management per intensivist     Reviewed emergency department notes  Reviewed admission history and physical     Julia Adrian MD  08/01/24  07:49 CDT    Electronically signed by Julia Adrian MD at 08/01/24 1032       Emeka Garay MD at 08/01/24 0831        Consult Orders    1. Inpatient Cardiology Consult [[id number]] ordered by Lamine Figueroa APRN                 Inpatient Cardiology Consult  Consult performed by: Emeka Garay MD  Consult ordered by: Lamine Figueroa APRN  Reason for consult: A-fib RVR, elevated troponin        Chief Complaint   Patient presents with    Altered Mental Status     HPI: This is a 69-year-old male who is well-known to me as we follow him for coronary artery disease as well as atrial fibrillation in the outpatient setting.  Sadly, the patient has been admitted multiple times to the hospital in recent years for a host of complaints.  He presented this time with altered mental status and was found to be in what appears to be diabetic ketoacidosis.  The patient this morning says he is feeling very poorly and has some lower abdominal pain but denies having chest discomfort or palpitations.  Upon arrival, his heart rate was elevated he tells me that he was not experiencing palpitations although he was also noted to be altered upon arrival.  In any event, he notes no palpitations or chest discomfort at this time.  He notes breathing being relatively stable although historically, this patient has endorsed mild shortness of breath and dyspnea with exertion.  He has some degree of chronic lower extremity edema which he tells me is essentially the same as usual.  He notes that he has been compliant with his medications but his blood glucose is out of control, at least upon arrival and his hemoglobin A1c shows poor control of blood glucose.  In any  event, cardiology has been asked to see the patient regarding the atrial fibrillation with elevated heart rate on arrival and the elevated troponin that was found in his workup.    Overnight, he has been admitted to the intensive care unit/CCU and his blood sugars have improved.  He is still feeling very poorly this morning but other than some lower quadrant abdominal pain, he endorses no other symptoms, particularly of the cardiac nature at this particular time.    Past Medical History:   Diagnosis Date    Arthritis     Autonomic disease     CAD (coronary artery disease) 02/06/2017    Cervical radiculopathy 09/16/2021    Chronic constipation with acute exaccerbation 05/10/2021    Coronary artery disease     Degeneration of cervical intervertebral disc 08/11/2021    Diabetes mellitus     Diabetic foot ulcer 08/31/2020    Diabetic polyneuropathy associated with type 2 diabetes mellitus 01/18/2021    Elevated cholesterol     Gastroesophageal reflux disease 05/13/2019    Headache     HTN (hypertension), benign 05/03/2017    Hyperlipidemia     Hypertension     Mixed hyperlipidemia 02/07/2017    Multiple lung nodules 01/26/2020    5mm, 9 mm RLL identified 1/2020, not present 10/2019.    Myocardial infarction     Osteomyelitis 01/22/2020    Osteomyelitis of fifth toe of right foot 10/07/2019    Pancreatitis     Persistent insomnia 01/20/2020    Renal disorder     Sleep apnea 02/06/2017    Sleep apnea with use of continuous positive airway pressure (CPAP)     NON-COMPLIANT    Slow transit constipation 01/16/2019    Spinal stenosis in cervical region 09/16/2021    Vitamin D deficiency 03/02/2021     Past Surgical History:   Procedure Laterality Date    ABDOMINAL SURGERY      AMPUTATION FOOT / TOE Left 10/2021    5th digit     ANTERIOR CERVICAL DISCECTOMY W/ FUSION N/A 08/05/2022    Procedure: CERVICAL DISCECTOMY ANTERIOR WITH FUSION C5-6 with possible plating of C5-7 with neuromonitoring and 1 c-arm;  Surgeon: Heydi  Karel MACK MD;  Location: [facility] OR;  Service: Neurosurgery;  Laterality: N/A;    APPENDECTOMY      BACK SURGERY      CARDIAC CATHETERIZATION Left 02/08/2021    Procedure: Left Heart Cath w poss intervention left anatomical snuff box acess;  Surgeon: Omkar Charles DO;  Location: [facility] CATH INVASIVE LOCATION;  Service: Cardiology;  Laterality: Left;    CARDIAC SURGERY      CATARACT EXTRACTION      CERVICAL SPINE SURGERY      COLONOSCOPY N/A 01/31/2017    Normal exam repeat in 5 years    COLONOSCOPY N/A 02/11/2019    Mild acute inflammation    COLONOSCOPY N/A 04/07/2024    2 areas at 10 and 20 cm with friability ulceration 2 clips placed at 20 cm and 4 clips at 10 cm poor prep normal mucosa,mild eroisions and ulcerations in visible vessels    COLONOSCOPY N/A 7/1/2024    Procedure: COLONOSCOPY WITH ANESTHESIA;  Surgeon: Arsalan Lorenzo DO;  Location: [facility] ENDOSCOPY;  Service: Gastroenterology;  Laterality: N/A;  pre op constipation/diarrhea  post poor prep  pcp Del Shetty    COLONOSCOPY N/A 7/2/2024    Procedure: COLONOSCOPY WITH ANESTHESIA;  Surgeon: Agapito Christopher MD;  Location: [facility] ENDOSCOPY;  Service: Gastroenterology;  Laterality: N/A;  pre rectal bleeding  post poor prep  pcp Del Shetty MD    COLONOSCOPY W/ POLYPECTOMY  03/04/2014    Hyperplastic polyp    CORONARY ARTERY BYPASS GRAFT  10/2015    ENDOSCOPY  04/13/2011    Gastritis with hemorrhage    ENDOSCOPY N/A 05/05/2017    Normal exam    ENDOSCOPY N/A 02/11/2019    Gastritis    ENDOSCOPY N/A 09/01/2020    Non-erosive gastritis with hemorrhage    ENDOSCOPY N/A 02/10/2021    Esophagitis    ENDOSCOPY N/A 04/11/2024    There were esophageal mucosal changes suspicious for short-segment Low's esophagus present in the distal esophagus. The maximum longitudinal extent of these mucosal changes was 2 cm in length. Mucosa was biopsied with a cold forceps for histologyDistal esophagus, biopsies: Mild chronic active esophagogastritis.  No evidence of intestinal metaplasia, dysplasia. Antrum, bx, Mild chronic gastritis    FOOT SURGERY Left     INCISION AND DRAINAGE OF WOUND Left 09/2007    spider bite     Prior to Admission medications    Medication Sig Start Date End Date Taking? Authorizing Provider   ascorbic acid (VITAMIN C) 1000 MG tablet Take 1 tablet by mouth Daily. 8/25/23      bumetanide (BUMEX) 1 MG tablet Take 1 tablet by mouth 2 (Two) Times a Day for 30 days. 7/3/24 8/2/24  Josué De Oliveira MD   busPIRone (BUSPAR) 10 MG tablet Take 1 tablet by mouth 2 (Two) Times a Day.    Provider, MD Bossman   calcitriol (ROCALTROL) 0.5 MCG capsule Take 1 capsule by mouth Daily. 2/23/23      carvedilol (COREG) 3.125 MG tablet Take 1 tablet twice a day by oral route. 3/26/24      Diclofenac Sodium (VOLTAREN) 1 % gel gel Apply 2 g topically to the appropriate area as directed 4 (Four) Times a Day As Needed. 6/1/23      donepezil (ARICEPT) 10 MG tablet Take 1 tablet by mouth Daily. 5/3/24      DULoxetine (CYMBALTA) 60 MG capsule Take 1 capsule by mouth Daily. 3/11/24      famotidine (PEPCID) 20 MG tablet Take 1 tablet twice a day by oral route. 3/26/24      fluticasone (FLONASE) 50 MCG/ACT nasal spray Instill 1 spray in each nostril as directed by provider Daily. 4/18/24      Insulin Glargine (Lantus SoloStar) 100 UNIT/ML injection pen Inject 20 Units under the skin into the appropriate area as directed every night at bedtime. 5/9/24      Insulin Regular Human, Conc, (HumuLIN R) 500 UNIT/ML solution pen-injector CONCENTRATED injection Inject 15 Units under the skin into the appropriate area as directed 3 (Three) Times a Day Before Meals.    Provider, MD Bossman   Insulin Regular Human, Conc, (HumuLIN R) 500 UNIT/ML solution pen-injector CONCENTRATED injection Inject 40 Units under the skin into the appropriate area as directed 3 (Three) Times a Day Before Meals. Inject 40 units under the skin in the the appropriate area with regular meals AND  40 units with large meals.    Bossman Blount MD   Iron-Vitamin C (Vitron-C)  MG tablet Take 1 tablet twice a day by oral route. 4/18/24      levocetirizine (XYZAL) 5 MG tablet Take 1 tablet by mouth every day at bedtime. 5/9/24      melatonin 3 MG tablet Take 2 tablets by mouth At Night As Needed for Sleep. 4/11/24   Rene Maloney MD   nitroglycerin (NITROSTAT) 0.4 MG SL tablet Place 1 tablet under the tongue Every 5 (Five) Minutes As Needed for Chest Pain. Take no more than 3 doses in 15 minutes.    Bossman Blount MD   oxyCODONE (ROXICODONE) 10 MG tablet Take 1 tablet by mouth 2 (Two) Times a Day As Needed. Must last 30 days per md. 7/24/24      pantoprazole (PROTONIX) 40 MG EC tablet Take 1 tablet by mouth Every 12 (Twelve) Hours. 6/19/24      polyethylene glycol (MIRALAX) 17 GM/SCOOP powder Take 17 g by mouth Daily As Needed (constipation).    Bossman Blount MD   pregabalin (LYRICA) 100 MG capsule Take 2 capsules by mouth Every Night.    Bossman Blount MD   pregabalin (LYRICA) 100 MG capsule Take 1 capsule by mouth Every Morning, THEN 2 capsules Every Evening. 7/24/24 9/21/24     rosuvastatin (CRESTOR) 10 MG tablet Take 1 tablet by mouth Every Night. 5/9/24      sennosides-docusate (PERICOLACE) 8.6-50 MG per tablet Take 1 tablet by mouth Every Night. Obtain OTC 8/23/23   Lexie Houston APRN   sodium hypochlorite (DAKIN'S 1/4 STRENGTH) 0.125 % solution topical solution 0.125% Apply 1 application topically to the appropriate area as directed 2 (Two) Times a Day. 5/13/24      sucralfate (CARAFATE) 1 g tablet Take 1 tablet by mouth 4 (Four) Times a Day before meals. 5/3/24      tamsulosin (FLOMAX) 0.4 MG capsule 24 hr capsule Take 1 capsule by mouth Daily. 8/7/23      spironolactone (ALDACTONE) 25 MG tablet Take 1 tablet by mouth Daily. 9/28/20 12/31/20  Del Shetty MD     Allergies   Allergen Reactions    Cefepime Hives and Anaphylaxis    Bactrim  "[Sulfamethoxazole-Trimethoprim] Other (See Comments)     \"RENAL FAILURE\"    Vancomycin Itching    Zolpidem Mental Status Change     \"makes him crazy\"    Metronidazole Rash     Social History     Tobacco Use    Smoking status: Former     Current packs/day: 0.00     Types: Cigarettes     Quit date:      Years since quittin.6    Smokeless tobacco: Never    Tobacco comments:     smoked in highschool   Substance Use Topics    Alcohol use: No     Family History   Problem Relation Age of Onset    Colon cancer Father     Heart disease Father     Colon cancer Sister     Colon polyps Sister     Alzheimer's disease Mother     Coronary artery disease Sister     Coronary artery disease Sister      Review of Systems: All pertinent negatives and positives as noted above.  Otherwise, all systems reviewed and found to be negative.    Physical Exam:  /72   Pulse 78   Temp 97.6 °F (36.4 °C) (Oral)   Resp 20   Ht 182.9 cm (72\")   Wt 130 kg (286 lb 13.1 oz)   SpO2 97%   BMI 38.90 kg/m²   Temp:  [97.6 °F (36.4 °C)-99.5 °F (37.5 °C)] 97.6 °F (36.4 °C)  Heart Rate:  [] 78  Resp:  [20-25] 20  BP: ()/(46-75) 131/72    Gen.: Chronically ill in appearance, awake and interactive, appears to be in no acute distress  HEENT: Normocephalic, atraumatic, pupils equally round and reactive to light, oropharynx clear, mucous membranes dry  Neck: Large circumference, no audible carotid bruits, no obvious elevation of JVP  CV: Irregular, normal rate, no audible murmurs appreciated  Pulmonary: Decreased breath sounds bilaterally, equal chest expansion with inspiration, no obvious wheezes, rhonchi, rales  GI: Morbidly obese, mild tenderness in bilateral lower quadrants with no rebound or guarding, active bowel sounds, nondistended  Extremities: Ulcerative type lesions noted on toes on both feet, dressings in place on bilateral heels, edema noted  Neurologic: Cranial nerves are grossly intact, patient moves all 4 extremities " equally during examination    Diagnostic Data:    Lab Results   Component Value Date    WBC 12.83 (H) 08/01/2024    HGB 9.0 (L) 08/01/2024    HCT 28.3 (L) 08/01/2024    MCV 83.7 08/01/2024     08/01/2024     Lab Results   Component Value Date    GLUCOSE 370 (H) 08/01/2024    CALCIUM 8.8 08/01/2024     08/01/2024    K 4.1 08/01/2024    CO2 21.0 (L) 08/01/2024     08/01/2024    BUN 50 (H) 08/01/2024    CREATININE 2.28 (H) 08/01/2024    EGFR 30.5 (L) 08/01/2024    BCR 21.9 08/01/2024    ANIONGAP 14.0 08/01/2024     Glucose on arrival of 704 with an anion gap of 16    High-sensitivity troponin: 342, 353    Lab Results   Component Value Date    HGBA1C 12.60 (H) 07/31/2024     Urinalysis with 3+ glucose, trace ketones, trace blood, positive nitrite, 2+ protein, 3-5 red blood cells, 6-10 white blood cells, 2+ bacteria    Chest x-ray on arrival: Hypoventilation with vascular crowding.  Mild bronchial wall thickening, stable    Left foot x-ray:  1. Deep ulcer on the sole of the foot posteriorly near the calcaneus.  There is a questionable small area of bony erosion along the plantar  surface of the calcaneus just proximal to the plantar calcaneal spur.  Osteomyelitis cannot be ruled out. The soft tissue ulcer is packed with  some type of radiopaque material.  2. Linear foreign bodies projected over the soft tissues of the second  toe and first toe. Artifacts are also included in the differential.  3. Other chronic changes, as discussed.    CT abdomen and pelvis:  1. A significant perinephric fat stranding is similar to the previous  study and is a nonspecific finding. Possibility for chronic inflammatory  process/chronic pyelonephritis may not be excluded. Renal functions are  normal and symmetrical.  2. Fatty infiltration of the pancreas. No mass. No ductal dilatation.  3. Nonspecific abdominal, pelvic and inguinal lymphadenopathy. The  etiology and clinical significance is not certain. This  appears  moderately more progressive since the previous study.  4. Diffuse subcutaneous fat infiltration of the abdominal wall extending  into the lower extremity may represent fluid overload?.  5. A significant large volume of stool in the colon without evidence of  obstruction. This may represent constipation.    CT head: No acute process    ECG on arrival: Atrial fibrillation, ventricular rate 128, left axis deviation, poor R wave progression, nonspecific ST changes    Results for orders placed during the hospital encounter of 03/26/24    Adult Transthoracic Echo Complete W/ Cont if Necessary Per Protocol    Interpretation Summary    The study is technically difficult for diagnosis.  If there is concern for possible infective endocarditis, recommend transesophageal echocardiogram to better visualize the valves.    Left ventricular systolic function is normal. Left ventricular ejection fraction appears to be 61 - 65%.    Left ventricular wall thickness is consistent with mild concentric hypertrophy    Left ventricular diastolic function is consistent with (grade III w/high LAP) fixed restrictive pattern.    Mildly reduced right ventricular systolic function noted.    The left atrial cavity is mildly dilated.    There is mild calcification of the aortic valve. No aortic valve regurgitation is present. No hemodynamically significant aortic valve stenosis is present.    ASSESSMENT/PLAN:    1.  Altered mental status due to problem #2, most likely  2.  Diabetic ketoacidosis  3.  Urinary tract infection  4.  Diabetic foot ulcer with radiographic concern for osteomyelitis  5.  Atrial fibrillation with rapid ventricular response, present on arrival  6.  Elevated troponin: No ischemic symptoms, therefore considered to represent myocardial injury in the setting of the above mentioned medical problems  7.  Abdominal pain: Essentially chronic for this patient.  CT scan findings noted above  8.  Stage IV chronic kidney  "disease    -We are asked to see this patient with atrial fibrillation and elevated heart rate along with elevated troponin: The patient had elevated heart rate upon arrival which is not surprising given his host of medical issues present on arrival including all of the above-mentioned problems.  His heart rate is now improved and the patient is otherwise asymptomatic.  Therefore, I would not recommend any further testing or changes in therapy regarding atrial fibrillation.  Regarding stroke risk reduction, he has not historically been anticoagulated as he is thought to be a very poor candidate for anticoagulation given his host of medical issues including multiple hospital admissions, infections, renal insufficiency, bouts of altered mental status, etc.  Therefore, I would not recommend therapeutic anticoagulation at this time for the patient.  -In regards to elevated troponin: The patient frankly denies chest pain.  I am not certain why this was checked by the emergency department provider other than simply following a \"protocol\" to check troponin levels on patient to come to the emergency department.  Without any suspicion of myocardial ischemia, there is simply not a good reason to check a troponin level in this particular patient who has simply had an elevated troponin level at almost every visit to the hospital but more importantly, in this particular case, with worsening renal function on arrival, diabetic ketoacidosis, atrial fibrillation with elevated heart rate, it is not surprising to see a troponin elevation significantly higher than what was previously appreciated.  At this time, no ischemic testing will be offered as the patient simply does not have any ischemic symptoms and I do not feel that this would be beneficial at this time.  -No further cardiac needs at the present time therefore we will be available as needed.  Please feel free to call if there are further questions.        Electronically " signed by Emeka Garay MD at 08/01/24 0879

## 2024-08-02 NOTE — PROGRESS NOTES
Nephrology ([facility]) Progress Note      Patient:  Erick Luong  YOB: 1955  Date of Service: 8/2/2024  MRN: [id number]   Acct: [id number]   Primary Care Physician: Del Shetty MD  Advance Directive:   Code Status and Medical Interventions: CPR (Attempt to Resuscitate); Full Support   Ordered at: 08/01/24 0053     Level Of Support Discussed With:    Patient     Code Status (Patient has no pulse and is not breathing):    CPR (Attempt to Resuscitate)     Medical Interventions (Patient has pulse or is breathing):    Full Support     Admit Date: 7/31/2024       Hospital Day: 2  Referring Provider: Abebe Garzon DO      Patient personally seen and examined.  Complete chart including Consults, Notes, Operative Reports, Labs, Cardiology, and Radiology studies reviewed as able.        Subjective:  Erick Luong is a 69 y.o. male for whom we were consulted for evaluation and treatment of acute kidney injury. Baseline chronic kidney disease stage 3b. Baseline creatinine approximately 1.5-1.8. Follows in our office.  History of poorly controlled type 2 diabetes, hypertension, coronary artery disease, diabetic foot ulcer, obesity.  On 7/31 patient was found wandering at home, confused. Brought to ER for evaluation. Has a nonhealing left foot diabetic ulcer with serosanguineous drainage. Blood glucose level was >700 with A1c >12%. Initial creatinine was 2.67 and has steadily improved since he was admitted. Nephrology consulted on 8/01. Hospital course remarkable for improving renal function and improved blood glucose levels.     Today is awake and alert. Complaint of general malaise/feeling poorly. Urine output nonoliguric    Allergies:  Cefepime, Bactrim [sulfamethoxazole-trimethoprim], Vancomycin, Zolpidem, and Metronidazole    Home Meds:  Medications Prior to Admission   Medication Sig Dispense Refill Last Dose    ascorbic acid (VITAMIN C) 1000 MG tablet Take 1 tablet by  mouth Daily. 30 tablet 3     bumetanide (BUMEX) 1 MG tablet Take 1 tablet by mouth 2 (Two) Times a Day for 30 days. 60 tablet 0     busPIRone (BUSPAR) 10 MG tablet Take 1 tablet by mouth 2 (Two) Times a Day.       calcitriol (ROCALTROL) 0.5 MCG capsule Take 1 capsule by mouth Daily. 90 capsule 4     carvedilol (COREG) 3.125 MG tablet Take 1 tablet twice a day by oral route. 60 tablet 4     donepezil (ARICEPT) 10 MG tablet Take 1 tablet by mouth Daily. 90 tablet 1     DULoxetine (CYMBALTA) 60 MG capsule Take 1 capsule by mouth Daily. 90 capsule 4     famotidine (PEPCID) 20 MG tablet Take 1 tablet twice a day by oral route. 180 tablet 4     Insulin Glargine (Lantus SoloStar) 100 UNIT/ML injection pen Inject 20 Units under the skin into the appropriate area as directed every night at bedtime. 15 mL 3     Insulin Regular Human, Conc, (HumuLIN R) 500 UNIT/ML solution pen-injector CONCENTRATED injection Inject 40 Units under the skin into the appropriate area as directed 2 (Two) Times a Day Before Meals. Inject 40 units under the skin in the the appropriate area with regular meals AND 40 units with large meals.       Iron-Vitamin C (Vitron-C)  MG tablet Take 1 tablet twice a day by oral route. 60 tablet 2     levocetirizine (XYZAL) 5 MG tablet Take 1 tablet by mouth every day at bedtime. 30 tablet 2     melatonin 3 MG tablet Take 2 tablets by mouth At Night As Needed for Sleep. 30 tablet 0     oxyCODONE (ROXICODONE) 10 MG tablet Take 1 tablet by mouth 2 (Two) Times a Day As Needed. Must last 30 days per md. 55 tablet 0     pantoprazole (PROTONIX) 40 MG EC tablet Take 1 tablet by mouth Every 12 (Twelve) Hours. 180 tablet 4     pregabalin (LYRICA) 100 MG capsule Take 1 capsule by mouth Daily.       pregabalin (LYRICA) 100 MG capsule Take 2 capsules by mouth every night at bedtime.       rosuvastatin (CRESTOR) 10 MG tablet Take 1 tablet by mouth Every Night. 30 tablet 2     sucralfate (CARAFATE) 1 g tablet Take 1  tablet by mouth 4 (Four) Times a Day before meals. 360 tablet 1     Diclofenac Sodium (VOLTAREN) 1 % gel gel Apply 2 g topically to the appropriate area as directed 4 (Four) Times a Day As Needed. 300 g 11     nitroglycerin (NITROSTAT) 0.4 MG SL tablet Place 1 tablet under the tongue Every 5 (Five) Minutes As Needed for Chest Pain. Take no more than 3 doses in 15 minutes.       polyethylene glycol (MIRALAX) 17 GM/SCOOP powder Take 17 g by mouth Daily As Needed (constipation).       sennosides-docusate (PERICOLACE) 8.6-50 MG per tablet Take 1 tablet by mouth Every Night. Obtain OTC          Medicines:  Current Facility-Administered Medications   Medication Dose Route Frequency Provider Last Rate Last Admin    sennosides-docusate (PERICOLACE) 8.6-50 MG per tablet 2 tablet  2 tablet Oral BID Lamine Figueroa APRN   2 tablet at 08/01/24 2048    And    polyethylene glycol (MIRALAX) packet 17 g  17 g Oral Daily PRN Lamine Figueroa APRN        And    bisacodyl (DULCOLAX) EC tablet 5 mg  5 mg Oral Daily PRN Lamine Figueroa APRN        And    bisacodyl (DULCOLAX) suppository 10 mg  10 mg Rectal Daily PRN Lamine Figueroa APRN        Calcium Replacement - Follow Nurse / BPA Driven Protocol   Does not apply PRN Abebe Garzon DO        Chlorhexidine Gluconate Cloth 2 % pads 1 Application  1 Application Topical Q24H Lamine Figueroa APRN   1 Application at 08/02/24 0431    dextrose (D50W) (25 g/50 mL) IV injection 10-50 mL  10-50 mL Intravenous Q15 Min PRN Abebe Garzon DO        dextrose (D50W) (25 g/50 mL) IV injection 25 g  25 g Intravenous Q15 Min PRN Lamine Figueroa APRN        dextrose (GLUTOSE) oral gel 15 g  15 g Oral Q15 Min PRN Lamine Figueroa APRN        Enoxaparin Sodium (LOVENOX) syringe 135 mg  1 mg/kg Subcutaneous Q12H Abebe Garzon DO   135 mg at 08/01/24 2053    glucagon (GLUCAGEN) injection 1 mg  1 mg Intramuscular Q15 Min PRN Lamine Figueroa APRN        insulin glargine (LANTUS, SEMGLEE)  injection 10 Units  10 Units Subcutaneous Daily Keren Jamison MD   10 Units at 08/01/24 0954    insulin regular (humuLIN R,novoLIN R) injection 3-14 Units  3-14 Units Subcutaneous 4x Daily AC & at Bedtime Keren Jamison MD   3 Units at 08/02/24 0752    Magnesium Standard Dose Replacement - Follow Nurse / BPA Driven Protocol   Does not apply PRN Abebe Garzon DO        mupirocin (BACTROBAN) 2 % nasal ointment 1 Application  1 Application Each Nare BID Lamine Figueroa APRN   1 Application at 08/01/24 2048    nitroglycerin (NITROSTAT) SL tablet 0.4 mg  0.4 mg Sublingual Q5 Min PRN Lamine Figueroa APRN        oxyCODONE (ROXICODONE) immediate release tablet 10 mg  10 mg Oral BID Lamine Figueroa APRN   10 mg at 08/01/24 2048    Pharmacy to Dose enoxaparin (LOVENOX)   Does not apply Continuous PRN Abebe Garzon DO        Pharmacy to Dose Zosyn   Does not apply Continuous PRN Lamine Figueroa APRN        Phosphorus Replacement - Follow Nurse / BPA Driven Protocol   Does not apply PRN Abebe Garzon DO        piperacillin-tazobactam (ZOSYN) 3.375 g IVPB in 100 mL NS MBP (CD)  3.375 g Intravenous Q8H Lamine Figueroa APRN   3.375 g at 08/02/24 0210    Potassium Replacement - Follow Nurse / BPA Driven Protocol   Does not apply PRN Abebe Garzon DO        pregabalin (LYRICA) capsule 100 mg  100 mg Oral Nightly Reuben Garza APRN        pregabalin (LYRICA) capsule 50 mg  50 mg Oral Daily Reuben Garza APRN        sodium chloride 0.9 % flush 10 mL  10 mL Intravenous Q12H Abebe Garzon DO   10 mL at 08/01/24 2048    sodium chloride 0.9 % flush 10 mL  10 mL Intravenous PRN Abebe Garzon DO        sodium chloride 0.9 % flush 10 mL  10 mL Intravenous Q12H Lamine Figueroa APRN   10 mL at 08/01/24 2048    sodium chloride 0.9 % flush 10 mL  10 mL Intravenous PRN Lamine Figueroa, SUSAN        sodium chloride 0.9 % infusion 40 mL  40 mL Intravenous PRN Abebe Garzon, DO        sodium  chloride 0.9 % infusion 40 mL  40 mL Intravenous PRN Lamine Figueroa APRN        sodium chloride 0.9 % infusion  75 mL/hr Intravenous Continuous Brian Lomas MD 75 mL/hr at 08/02/24 0154 75 mL/hr at 08/02/24 0154    sodium hypochlorite (DAKIN'S 1/4 STRENGTH) 0.125 % topical solution 0.125% solution   Topical Q12H Aby High APRN   Given at 08/01/24 2048    vancomycin 750 mg/250 mL 0.9% NS IVPB ([facility])  750 mg Intravenous Q24H Lamine Figueroa APRN   750 mg at 08/01/24 2054       Past Medical History:  Past Medical History:   Diagnosis Date    Arthritis     Autonomic disease     CAD (coronary artery disease) 02/06/2017    Cervical radiculopathy 09/16/2021    Chronic constipation with acute exaccerbation 05/10/2021    Coronary artery disease     Degeneration of cervical intervertebral disc 08/11/2021    Diabetes mellitus     Diabetic foot ulcer 08/31/2020    Diabetic polyneuropathy associated with type 2 diabetes mellitus 01/18/2021    Elevated cholesterol     Gastroesophageal reflux disease 05/13/2019    Headache     HTN (hypertension), benign 05/03/2017    Hyperlipidemia     Hypertension     Mixed hyperlipidemia 02/07/2017    Multiple lung nodules 01/26/2020    5mm, 9 mm RLL identified 1/2020, not present 10/2019.    Myocardial infarction     Osteomyelitis 01/22/2020    Osteomyelitis of fifth toe of right foot 10/07/2019    Pancreatitis     Persistent insomnia 01/20/2020    Renal disorder     Sleep apnea 02/06/2017    Sleep apnea with use of continuous positive airway pressure (CPAP)     NON-COMPLIANT    Slow transit constipation 01/16/2019    Spinal stenosis in cervical region 09/16/2021    Vitamin D deficiency 03/02/2021       Past Surgical History:  Past Surgical History:   Procedure Laterality Date    ABDOMINAL SURGERY      AMPUTATION FOOT / TOE Left 10/2021    5th digit     ANTERIOR CERVICAL DISCECTOMY W/ FUSION N/A 08/05/2022    Procedure: CERVICAL DISCECTOMY ANTERIOR WITH FUSION C5-6 with possible  plating of C5-7 with neuromonitoring and 1 c-arm;  Surgeon: Karel Soliz MD;  Location: [facility] OR;  Service: Neurosurgery;  Laterality: N/A;    APPENDECTOMY      BACK SURGERY      CARDIAC CATHETERIZATION Left 02/08/2021    Procedure: Left Heart Cath w poss intervention left anatomical snuff box acess;  Surgeon: Omkar Charles DO;  Location: [facility] CATH INVASIVE LOCATION;  Service: Cardiology;  Laterality: Left;    CARDIAC SURGERY      CATARACT EXTRACTION      CERVICAL SPINE SURGERY      COLONOSCOPY N/A 01/31/2017    Normal exam repeat in 5 years    COLONOSCOPY N/A 02/11/2019    Mild acute inflammation    COLONOSCOPY N/A 04/07/2024    2 areas at 10 and 20 cm with friability ulceration 2 clips placed at 20 cm and 4 clips at 10 cm poor prep normal mucosa,mild eroisions and ulcerations in visible vessels    COLONOSCOPY N/A 7/1/2024    Procedure: COLONOSCOPY WITH ANESTHESIA;  Surgeon: Arsalan Lorenzo DO;  Location: [facility] ENDOSCOPY;  Service: Gastroenterology;  Laterality: N/A;  pre op constipation/diarrhea  post poor prep  pcp Del Shetty    COLONOSCOPY N/A 7/2/2024    Procedure: COLONOSCOPY WITH ANESTHESIA;  Surgeon: Agapito Christopher MD;  Location: [facility] ENDOSCOPY;  Service: Gastroenterology;  Laterality: N/A;  pre rectal bleeding  post poor prep  pcp Del Shetty MD    COLONOSCOPY W/ POLYPECTOMY  03/04/2014    Hyperplastic polyp    CORONARY ARTERY BYPASS GRAFT  10/2015    ENDOSCOPY  04/13/2011    Gastritis with hemorrhage    ENDOSCOPY N/A 05/05/2017    Normal exam    ENDOSCOPY N/A 02/11/2019    Gastritis    ENDOSCOPY N/A 09/01/2020    Non-erosive gastritis with hemorrhage    ENDOSCOPY N/A 02/10/2021    Esophagitis    ENDOSCOPY N/A 04/11/2024    There were esophageal mucosal changes suspicious for short-segment Low's esophagus present in the distal esophagus. The maximum longitudinal extent of these mucosal changes was 2 cm in length. Mucosa was biopsied with a cold forceps for  histologyDistal esophagus, biopsies: Mild chronic active esophagogastritis. No evidence of intestinal metaplasia, dysplasia. Antrum, bx, Mild chronic gastritis    FOOT SURGERY Left     INCISION AND DRAINAGE OF WOUND Left 2007    spider bite       Family History  Family History   Problem Relation Age of Onset    Colon cancer Father     Heart disease Father     Colon cancer Sister     Colon polyps Sister     Alzheimer's disease Mother     Coronary artery disease Sister     Coronary artery disease Sister        Social History  Social History     Socioeconomic History    Marital status:    Tobacco Use    Smoking status: Former     Current packs/day: 0.00     Types: Cigarettes     Quit date:      Years since quittin.6    Smokeless tobacco: Never    Tobacco comments:     smoked in [employer]   Vaping Use    Vaping status: Never Used   Substance and Sexual Activity    Alcohol use: No    Drug use: No    Sexual activity: Defer       Review of Systems:  History obtained from chart review and the patient  General ROS: No fever or chills  Respiratory ROS: No cough, shortness of breath, wheezing  Cardiovascular ROS: No chest pain or palpitations  Gastrointestinal ROS: No abdominal pain or melena  Genito-Urinary ROS: No dysuria or hematuria  Psych ROS: No anxiety and depression  14 point ROS reviewed with the patient and negative except as noted above and in the HPI unless unable to obtain.    Objective:  Patient Vitals for the past 24 hrs:   BP Temp Temp src Pulse Resp SpO2   24 0500 133/74 -- -- 82 -- 90 %   24 0300 123/74 -- -- 80 -- 94 %   24 0200 138/72 -- -- 82 -- 92 %   24 0100 99/66 -- -- 75 -- 98 %   24 0000 126/67 96.9 °F (36.1 °C) Axillary 81 14 97 %   24 2300 110/63 -- -- 90 -- 96 %   24 2200 120/61 -- -- 82 -- 97 %   24 2100 110/68 -- -- 84 -- 100 %   24 2000 106/52 96.9 °F (36.1 °C) Axillary 72 16 96 %   24 1900 125/63 -- -- 76 -- 98 %    08/01/24 1820 104/54 -- -- 76 -- 99 %   08/01/24 1720 109/60 -- -- 71 -- 97 %   08/01/24 1708 90/55 -- -- 70 -- 98 %   08/01/24 1600 102/56 96.9 °F (36.1 °C) Axillary 69 -- 100 %   08/01/24 1500 130/68 -- -- 69 -- 100 %   08/01/24 1430 119/73 -- -- 67 -- 98 %   08/01/24 1400 131/73 -- -- 67 -- 99 %   08/01/24 1330 120/67 -- -- 70 -- 96 %   08/01/24 1300 125/79 -- -- 71 -- 95 %   08/01/24 1230 133/78 -- -- 67 -- 98 %   08/01/24 1200 130/80 96.1 °F (35.6 °C) Axillary 70 -- 98 %   08/01/24 1130 135/87 -- -- 69 -- 95 %   08/01/24 1100 131/74 -- -- 81 -- 100 %   08/01/24 1030 139/84 -- -- 73 -- 97 %   08/01/24 0945 137/76 -- -- 77 -- 98 %       Intake/Output Summary (Last 24 hours) at 8/2/2024 0839  Last data filed at 8/2/2024 0400  Gross per 24 hour   Intake 2795 ml   Output 2350 ml   Net 445 ml     General: awake/alert   Chest:  clear to auscultation bilaterally without respiratory distress  CVS: regular rate and rhythm  Abdominal: soft, nontender, positive bowel sounds  Extremities: no cyanosis or edema  Skin:  left foot ulcer and warm and dry without rash      Labs:  Results from last 7 days   Lab Units 08/02/24  0558 08/01/24  0419 07/31/24  1849   WBC 10*3/mm3 9.76 12.83* 15.48*   HEMOGLOBIN g/dL 10.0* 9.0* 9.8*   HEMATOCRIT % 31.8* 28.3* 29.6*   PLATELETS 10*3/mm3 219 176 201         Results from last 7 days   Lab Units 08/02/24  0558 08/01/24  0419 08/01/24  0210 07/31/24  2030 07/31/24  1849   SODIUM mmol/L 141 138 136   < > 132*   POTASSIUM mmol/L 3.8 4.1 4.0   < > 4.5   CHLORIDE mmol/L 108* 103 103   < > 97*   CO2 mmol/L 19.0* 21.0* 19.0*   < > 19.0*   BUN mg/dL 37* 50* 51*   < > 55*   CREATININE mg/dL 1.98* 2.28* 2.39*   < > 2.67*   CALCIUM mg/dL 8.5* 8.8 8.7   < > 9.0   EGFR mL/min/1.73 36.1* 30.5* 28.8*   < > 25.2*   BILIRUBIN mg/dL  --   --   --   --  0.6   ALK PHOS U/L  --   --   --   --  101   ALT (SGPT) U/L  --   --   --   --  8   AST (SGOT) U/L  --   --   --   --  11   GLUCOSE mg/dL 157* 370* 379*    < > 704*    < > = values in this interval not displayed.       Radiology:   Imaging Results (Last 72 Hours)       Procedure Component Value Units Date/Time    US Renal Bilateral [[id number]] Collected: 08/01/24 1300     Updated: 08/01/24 1305    Narrative:      US RENAL BILATERAL- 8/1/2024 11:19 AM     HISTORY: Acute kidney injury; E11.628-Type 2 diabetes mellitus with  other skin complications; L08.9-Local infection of the skin and  subcutaneous tissue, unspecified; M86.9-Osteomyelitis, unspecified;  E11.10-Type 2 diabetes mellitus with ketoacidosis without coma;  R79.89-Other specified abnormal findings of blood chemistry;  I48.91-Unspecified atrial fibrillation; R41.0-Disorientation,  unspecified     COMPARISON: None available.       TECHNIQUE: Multiple longitudinal and transverse real-time sonographic  images of the kidneys and urinary bladder are obtained. Report and  images stored per institutional and state regulations.           FINDINGS:     Visualized proximal abdominal aorta and IVC are unremarkable.        RIGHT KIDNEY: Right kidney is 10.7 cm in length. Renal cortex is  unremarkable. There is no hydronephrosis. There is an exophytic anechoic  simple right renal cyst measuring 4.9 cm..     LEFT KIDNEY: Left kidney is 11.3 cm in length. Renal cortex is  unremarkable. There is no left hydronephrosis.     PELVIS: Urinary bladder is unremarkable.          Impression:         1. Simple right renal cyst.  2. Otherwise unremarkable kidneys and no hydronephrosis.           This report was signed and finalized on 8/1/2024 1:02 PM by Eran Christina.       CT Abdomen Pelvis With Contrast [[id number]] Collected: 08/01/24 0543     Updated: 08/01/24 0754    Narrative:      EXAMINATION: CT ABDOMEN PELVIS W CONTRAST-      7/31/2024 10:41 PM     HISTORY: abdominal distention with pain; M86.9-Osteomyelitis,  unspecified; E11.10-Type 2 diabetes mellitus with ketoacidosis without  coma; R79.89-Other specified abnormal  findings of blood chemistry;  I48.91-Unspecified atrial fibrillation; R41.0-Disorientation,  unspecified     In order to have a CT radiation dose as low as reasonably achievable  Automated Exposure Control was utilized for adjustment of the mA and/or  KV according to patient size.     Total DLP = 1841.72 mGy.cm     The CT scan of the abdomen and pelvis is performed after intravenous  contrast enhancement.     The images are acquired in axial plane and subsequent reconstruction in  coronal and sagittal planes.     Comparison is made with the previous study dated 6/27/2024.     The lung bases included in the study show a trace right and small left  basal pleural effusion. There are mild atelectatic changes at bilateral  bases left more than the right.     Limited visualized cardiomediastinal structures show atheromatous  changes of the coronary arteries. There is moderate cardiomegaly.     The liver and spleen are normal.     The gallbladder is surgically absent.     Fatty infiltrated pancreas seen. No focal abnormality. No ductal  dilatation. The adrenal glands are normal.     There is persistent bilateral significant perinephric fat infiltration  and thickening of the pararenal fascia which is similar to the previous  study. Bilateral nephrogram is normal and symmetrical. No calculi. No  hydronephrosis. There is a well-defined sharply marginated low density  exophytic mass from the lower pole of the right kidney measuring 4.4 cm  in diameter. CT density suggest a cyst. Limited visualized ureters are  normal and nondilated. The urinary bladder is well distended. No  intrinsic abnormality.     Prostate is not significantly enlarged.     There are small fat-containing inguinal hernias, right larger than the  left.     There is subcutaneous fat infiltration of the entire abdominal wall  extending into the lower extremity. This may represent a fluid  overload?.     The stomach is decompressed with moderate wall  thickening. No focal  abnormality Alamast. Duodenum is normal. Small bowel is nondistended and  nondilated. Appendix is surgically absent. There is significant large  volume stool throughout the colon. No finding to suggest obstruction.     Atheromatous changes of the abdominal aorta and iliac arteries. No  aneurysmal dilatation.     Moderately prominent nonspecific retroperitoneal para-aortic lymph nodes  predominantly in the mid abdomen. A referenced left para iliac lymph  node, image #57 in series 2 and image #40 and series 3, measures 1.6 cm  in short axis. There is a large lymph node in the left lower anterior  pelvis/external iliac group measuring 1.3 cm in short axis. There are  moderately prominent inguinal lymph nodes. A left inguinal lymph node  measures 2 cm in short axis.     Images reviewed in bone window show chronic degenerative changes of the  lumbar spine. No acute bony abnormality.       Impression:      1. A significant perinephric fat stranding is similar to the previous  study and is a nonspecific finding. Possibility for chronic inflammatory  process/chronic pyelonephritis may not be excluded. Renal functions are  normal and symmetrical.  2. Fatty infiltration of the pancreas. No mass. No ductal dilatation.  3. Nonspecific abdominal, pelvic and inguinal lymphadenopathy. The  etiology and clinical significance is not certain. This appears  moderately more progressive since the previous study.  4. Diffuse subcutaneous fat infiltration of the abdominal wall extending  into the lower extremity may represent fluid overload?.  5. A significant large volume of stool in the colon without evidence of  obstruction. This may represent constipation.     The above study was initially reviewed and reported by [facility]. I do not  find any discrepancies.                                This report was signed and finalized on 8/1/2024 7:50 AM by Dr. Carlos Cutler MD.       CT Head Without Contrast  [[id number]] Collected: 08/01/24 0524     Updated: 08/01/24 0531    Narrative:      EXAMINATION: CT HEAD WO CONTRAST-      7/31/2024 10:41 PM     HISTORY: altered mental status; M86.9-Osteomyelitis, unspecified;  E11.10-Type 2 diabetes mellitus with ketoacidosis without coma;  R79.89-Other specified abnormal findings of blood chemistry;  I48.91-Unspecified atrial fibrillation; R41.0-Disorientation,  unspecified     In order to have a CT radiation dose as low as reasonably achievable  Automated Exposure Control was utilized for adjustment of the mA and/or  KV according to patient size.     Total DLP = 783.72 mGy.cm     The CT scan of the head is performed without intravenous contrast  enhancement.     The images are acquired in axial plane and subsequent reconstruction  with coronal and sagittal planes.     Comparison is made with the previous study dated 5/3/2024.     There is no evidence of a mass. There is no midline shift.     There is no evidence of intracranial hemorrhage or hematoma.     Moderately dilated ventricles, basal cisterns and the cortical sulci are  similar to the previous study representing chronic volume loss.     Areas of chronic white matter ischemia bilaterally are noted. The  gray-white matter differentiation is maintained.     Posterior fossa structures are normal.     The images reviewed in bone window show no acute displaced skull  fracture. A subtle nondisplaced fracture or lesion may be obscured due  to motion artifacts. Large mucous retention cyst is seen in the right  maxillary antrum. The remaining paranasal sinuses and mastoid air cells  are clear.       Impression:      1. No acute intracranial abnormality.  2. Chronic ischemic and atrophic changes.  3. Chronic maxillary sinusitis.     The above study was initially reviewed and reported by [facility]. I do not  find any discrepancies.                                      This report was signed and finalized on 8/1/2024 5:27 AM by   Carlos Cutler MD.       XR Foot 3+ View Left [[id number]] Collected: 07/31/24 1941     Updated: 07/31/24 1950    Narrative:      EXAMINATION:  XR FOOT 3+ VW LEFT-  7/31/2024 6:22 PM     HISTORY: Heel wound.     COMPARISON: 3/26/2024.     TECHNIQUE: 3 views were obtained.     FINDINGS: There is a deep ulcer on the sole of the foot posteriorly. The  ulcer appears to be packed with some type of radiopaque material. On the  lateral image, there may be a small area of bony erosion involving the  calcaneus just posterior to the plantar calcaneal spur. A small area of  osteomyelitis is not ruled out. There has been prior amputation of the  fifth toe and distal fifth metatarsal. There may have been prior  resection of the distal fourth metacarpal and a portion of the proximal  phalanx of the fourth toe versus chronic erosive change. The appearance  is stable. There is severe narrowing of the first MTP joint. There is  narrowing of some of the interphalangeal joints. There is some spurring  in the tarsal region and at the tarsal-metatarsal junction. There is  soft tissue swelling of the foot diffusely. There is a small curvilinear  foreign body in the soft tissues along the sole of the foot in the  second toe region in the proximal phalanx area. There is another small  linear foreign body in the soft tissues adjacent to the distal phalanx  of the great toe.          Impression:      1. Deep ulcer on the sole of the foot posteriorly near the calcaneus.  There is a questionable small area of bony erosion along the plantar  surface of the calcaneus just proximal to the plantar calcaneal spur.  Osteomyelitis cannot be ruled out. The soft tissue ulcer is packed with  some type of radiopaque material.  2. Linear foreign bodies projected over the soft tissues of the second  toe and first toe. Artifacts are also included in the differential.  3. Other chronic changes, as discussed.        This report was signed and finalized on  7/31/2024 7:47 PM by Dr. Raz Mendes MD.       XR Chest 1 View [[id number]] Collected: 07/31/24 1940     Updated: 07/31/24 1944    Narrative:      EXAMINATION:  XR CHEST 1 VW-  7/31/2024 6:22 PM     HISTORY: Altered mental status. Hypertension and diabetes.     COMPARISON: 6/28/2024.     TECHNIQUE: Single view AP image.     FINDINGS: There is hypoventilation with vascular crowding. There is mild  bronchial wall thickening, stable. There is no dense infiltrate or  effusion. Heart size is borderline. Prior heart bypass surgery. Prior  cervical fusion. No definite acute bony abnormality.          Impression:      1. Hypoventilation with vascular crowding.  2. Mild bronchial wall thickening, stable.           This report was signed and finalized on 7/31/2024 7:41 PM by Dr. Raz Mendes MD.               Culture:  Blood Culture   Date Value Ref Range Status   07/31/2024 No growth at 24 hours  Preliminary   07/31/2024 Staphylococcus, coagulase negative (C)  Final         Assessment    Acute kidney injury, ATN--improving  Baseline chronic kidney disease stage 3b  Type 2 diabetes  Hypertension  Sepsis related to diabetic foot ulcer  Anemia of CKD  Obesity     Plan:   Continue IV fluids  Renal function improving, not quite back to baseline level yet.  Monitor labs      Stewart Alvarez, APRN  8/2/2024  08:39 CDT

## 2024-08-02 NOTE — CASE MANAGEMENT/SOCIAL WORK
Continued Stay Note   [facility]     Patient Name: Erick Luong  MRN: [id number]  Today's Date: 8/2/2024    Admit Date: 7/31/2024    Plan: SNF placement vs HH?   Discharge Plan       Row Name 08/02/24 0942       Plan    Plan SNF placement vs HH?    Plan Comments SW followed up on CPS/APS report #[id number] this am and report was accepted.  SW has not heard from a  yet.  Patient has orders to move to medical floor.  Recommend PT/OT consults to determine need for SNF placement.  SW following to determine appropriate level of care upon discharge.                   Discharge Codes    No documentation.                       SUZIE Hernandez

## 2024-08-02 NOTE — PAYOR COMM NOTE
"Erick Luong (69 y.o. Male) [id number]   CONT CLINICAL   Please review clinical for Additional Days      [facility] phone     fax         Date of Birth   1955    Social Security Number       Address   [address] [place] [address]    Home Phone   [phone]    MRN   [id number]       Sikhism   [facility]    Marital Status                               Admission Date   7/31/24    Admission Type   Emergency    Admitting Provider   Keren Jamison MD    Attending Provider   Keren Jamison MD    Department, Room/Bed   [facility] CARDIAC CARE, C004/1       Discharge Date       Discharge Disposition       Discharge Destination                                 Attending Provider: Keren Jamison MD    Allergies: Cefepime, Bactrim [Sulfamethoxazole-trimethoprim], Vancomycin, Zolpidem, Metronidazole    Isolation: None   Infection: MRSA (05/19/19), COVID (History) (08/08/22), ESBL E coli (06/30/24)   Code Status: CPR    Ht: 182.9 cm (72\")   Wt: 130 kg (286 lb 13.1 oz)    Admission Cmt: None   Principal Problem: DKA, type 2, not at goal [E11.10]                   Active Insurance as of 7/31/2024       Primary Coverage       Payor Plan Insurance Group Employer/Plan Group    [employer] [facility] [employer] EMPLOYEE [id number]       Payor Plan Address Payor Plan Phone Number Payor Plan Fax Number Effective Dates    [address] [phone]  1/1/2022 - None Entered    [facility] [id number]         Subscriber Name Subscriber Birth Date Member ID       ZAINAB LUONG 11/27/1969 [id number]               Secondary Coverage       Payor Plan Insurance Group Employer/Plan Group    MEDICARE MEDICARE A & B        Payor Plan Address Payor Plan Phone Number Payor Plan Fax Number Effective Dates    [address] [phone]  7/1/2013 - None Entered    [facility] [id number]         Subscriber Name Subscriber Birth Date Member ID       ERICK LUONG " 1955 [id number]                     Emergency Contacts        (Rel.) Home Phone Work Phone Mobile Phone    Joan Luong (Spouse) [phone]              Vital Signs (last day)       Date/Time Temp Temp src Pulse Resp BP Patient Position SpO2    08/02/24 0900 -- -- 88 -- 124/71 -- 97    08/02/24 0800 97.4 (36.3) Axillary 85 15 130/72 -- 97    08/02/24 0700 -- -- 86 -- 137/65 -- 95    08/02/24 0500 -- -- 82 -- 133/74 -- 90    08/02/24 0300 -- -- 80 -- 123/74 -- 94    08/02/24 0200 -- -- 82 -- 138/72 -- 92    08/02/24 0100 -- -- 75 -- 99/66 -- 98    08/02/24 0000 96.9 (36.1) Axillary 81 14 126/67 Lying 97    08/01/24 2300 -- -- 90 -- 110/63 -- 96    08/01/24 2200 -- -- 82 -- 120/61 -- 97    08/01/24 2100 -- -- 84 -- 110/68 -- 100    08/01/24 2000 96.9 (36.1) Axillary 72 16 106/52 Lying 96    08/01/24 1900 -- -- 76 -- 125/63 -- 98    08/01/24 1820 -- -- 76 -- 104/54 -- 99    08/01/24 1720 -- -- 71 -- 109/60 -- 97    08/01/24 1708 -- -- 70 -- 90/55 -- 98    08/01/24 1600 96.9 (36.1) Axillary 69 -- 102/56 -- 100    08/01/24 1500 -- -- 69 -- 130/68 -- 100    08/01/24 1430 -- -- 67 -- 119/73 -- 98    08/01/24 1400 -- -- 67 -- 131/73 -- 99    08/01/24 1330 -- -- 70 -- 120/67 -- 96    08/01/24 1300 -- -- 71 -- 125/79 -- 95    08/01/24 1230 -- -- 67 -- 133/78 -- 98    08/01/24 1200 96.1 (35.6) Axillary 70 -- 130/80 -- 98    08/01/24 1130 -- -- 69 -- 135/87 -- 95    08/01/24 1100 -- -- 81 -- 131/74 -- 100    08/01/24 1030 -- -- 73 -- 139/84 -- 97    08/01/24 0945 -- -- 77 -- 137/76 -- 98    08/01/24 0830 96.4 (35.8) Axillary 72 16 125/75 -- 97    08/01/24 0715 -- -- 77 -- 128/73 -- 100    08/01/24 0700 -- -- 78 -- 131/72 -- 97    08/01/24 0600 -- -- 77 -- 130/75 -- 100    08/01/24 0500 -- -- 73 -- 124/69 -- --    08/01/24 0400 97.6 (36.4) Oral 76 -- 126/63 -- 98    08/01/24 0300 -- -- 72 -- 116/55 -- 99    08/01/24 0200 -- -- 85 -- 98/50 -- 98    08/01/24 0100 -- -- 83 -- 102/52  -- 96    08/01/24 0000 97.7 (36.5) Axillary -- -- -- -- --          Current Facility-Administered Medications   Medication Dose Route Frequency Provider Last Rate Last Admin    sennosides-docusate (PERICOLACE) 8.6-50 MG per tablet 2 tablet  2 tablet Oral BID Lamine Figueroa APRN   2 tablet at 08/02/24 0941    And    polyethylene glycol (MIRALAX) packet 17 g  17 g Oral Daily PRN Lamine Figueroa APRN        And    bisacodyl (DULCOLAX) EC tablet 5 mg  5 mg Oral Daily PRN Lamine Figueroa APRN        And    bisacodyl (DULCOLAX) suppository 10 mg  10 mg Rectal Daily PRN Lamine Figueroa APRN   10 mg at 08/02/24 0941    Calcium Replacement - Follow Nurse / BPA Driven Protocol   Does not apply PRN Abebe Garzon DO        Chlorhexidine Gluconate Cloth 2 % pads 1 Application  1 Application Topical Q24H Lamine Figueroa APRN   1 Application at 08/02/24 0431    dextrose (D50W) (25 g/50 mL) IV injection 10-50 mL  10-50 mL Intravenous Q15 Min PRN Abebe Garzon DO        dextrose (D50W) (25 g/50 mL) IV injection 25 g  25 g Intravenous Q15 Min PRN Lamine Figueroa APRN        dextrose (GLUTOSE) oral gel 15 g  15 g Oral Q15 Min PRN Lamine Figueroa APRN        Enoxaparin Sodium (LOVENOX) syringe 135 mg  1 mg/kg Subcutaneous Q12H Abebe Garzon DO   135 mg at 08/02/24 0933    glucagon (GLUCAGEN) injection 1 mg  1 mg Intramuscular Q15 Min PRN Lamine Figueroa APRN        insulin glargine (LANTUS, SEMGLEE) injection 10 Units  10 Units Subcutaneous Daily Keren Jamison MD   10 Units at 08/02/24 0932    insulin regular (humuLIN R,novoLIN R) injection 3-14 Units  3-14 Units Subcutaneous 4x Daily AC & at Bedtime Keren Jamison MD   3 Units at 08/02/24 0752    Magnesium Standard Dose Replacement - Follow Nurse / BPA Driven Protocol   Does not apply PRN Abebe Garzon DO        mupirocin (BACTROBAN) 2 % nasal ointment 1 Application  1 Application Each Nare BID Lamine Figueroa, SUSAN   1 Application at 08/02/24  0932    nitroglycerin (NITROSTAT) SL tablet 0.4 mg  0.4 mg Sublingual Q5 Min PRN Lamine Figueroa APRN        oxyCODONE (ROXICODONE) immediate release tablet 10 mg  10 mg Oral BID Lamine Figueroa APRN   10 mg at 08/02/24 0932    Pharmacy to Dose enoxaparin (LOVENOX)   Does not apply Continuous PRN Abebe Garzon,         Pharmacy to Dose Zosyn   Does not apply Continuous PRN Lamine Figueroa APRN        Phosphorus Replacement - Follow Nurse / BPA Driven Protocol   Does not apply PRN Abebe Garzon DO        Potassium Replacement - Follow Nurse / BPA Driven Protocol   Does not apply PRN Abebe Garzon DO        pregabalin (LYRICA) capsule 100 mg  100 mg Oral Nightly Reuben Garza APRN        pregabalin (LYRICA) capsule 50 mg  50 mg Oral Daily Reuben Garza APRN   50 mg at 08/02/24 0940    sodium chloride 0.9 % flush 10 mL  10 mL Intravenous Q12H Abebe Garzon,    10 mL at 08/02/24 0933    sodium chloride 0.9 % flush 10 mL  10 mL Intravenous PRN Abebe Garzon,         sodium chloride 0.9 % flush 10 mL  10 mL Intravenous Q12H Lamine Figueroa APRN   10 mL at 08/02/24 0933    sodium chloride 0.9 % flush 10 mL  10 mL Intravenous PRN Lamine Figueroa APRN        sodium chloride 0.9 % infusion 40 mL  40 mL Intravenous PRN Abebe Garzon DO        sodium chloride 0.9 % infusion 40 mL  40 mL Intravenous PRN Lamine Figueroa APRN        sodium chloride 0.9 % infusion  75 mL/hr Intravenous Continuous Brian Lomas MD 75 mL/hr at 08/02/24 0154 75 mL/hr at 08/02/24 0154    sodium hypochlorite (DAKIN'S 1/4 STRENGTH) 0.125 % topical solution 0.125% solution   Topical Q12H Aby High APRN   Given at 08/02/24 0941    vancomycin 750 mg/250 mL 0.9% NS IVPB ([facility])  750 mg Intravenous Q24H Lamine Figueroa APRN   750 mg at 08/01/24 2054        Physician Progress Notes (last 24 hours)        Julia Adrian MD at 08/02/24 0905          INFECTIOUS DISEASES PROGRESS  "NOTE    Patient:  Erick Luong  YOB: 1955  MRN: [id number]   Admit date: 7/31/2024   Admitting Physician: Keren Jamison MD  Primary Care Physician: Del Shetty MD    Chief Complaint: left         Interval History: The patient is up in chair.  Offers no new complaints but does note that his foot is tender.  I told him his wife informed yesterday that he had had a cast on his foot and asked when it was removed.  He said he thought about it 2 weeks ago per Dr. Gomes and shree.    Patient has orders to move to the floor.          Allergies:   Allergies   Allergen Reactions    Cefepime Hives and Anaphylaxis    Bactrim [Sulfamethoxazole-Trimethoprim] Other (See Comments)     \"RENAL FAILURE\"    Vancomycin Itching    Zolpidem Mental Status Change     \"makes him crazy\"    Metronidazole Rash       Current Scheduled Medications:   Chlorhexidine Gluconate Cloth, 1 Application, Topical, Q24H  enoxaparin, 1 mg/kg, Subcutaneous, Q12H  insulin glargine, 10 Units, Subcutaneous, Daily  insulin regular, 3-14 Units, Subcutaneous, 4x Daily AC & at Bedtime  mupirocin, 1 Application, Each Nare, BID  oxyCODONE, 10 mg, Oral, BID  piperacillin-tazobactam, 3.375 g, Intravenous, Q8H  pregabalin, 100 mg, Oral, Nightly  pregabalin, 50 mg, Oral, Daily  senna-docusate sodium, 2 tablet, Oral, BID  sodium chloride, 10 mL, Intravenous, Q12H  sodium chloride, 10 mL, Intravenous, Q12H  sodium hypochlorite, , Topical, Q12H  vancomycin, 750 mg, Intravenous, Q24H      Current PRN Medications:    senna-docusate sodium **AND** polyethylene glycol **AND** bisacodyl **AND** bisacodyl    Calcium Replacement - Follow Nurse / BPA Driven Protocol    dextrose    dextrose    dextrose    glucagon (human recombinant)    Magnesium Standard Dose Replacement - Follow Nurse / BPA Driven Protocol    nitroglycerin    Pharmacy to Dose enoxaparin (LOVENOX)    Pharmacy to Dose Zosyn    Phosphorus Replacement - Follow Nurse / BPA Driven " "Protocol    Potassium Replacement - Follow Nurse / BPA Driven Protocol    sodium chloride    sodium chloride    sodium chloride    sodium chloride    Pharmacy to Dose enoxaparin (LOVENOX),   Pharmacy to Dose Zosyn,   sodium chloride, 75 mL/hr, Last Rate: 75 mL/hr (24 0154)           Objective     Vital Signs:  Temp (24hrs), Av.8 °F (36 °C), Min:96.1 °F (35.6 °C), Max:97.4 °F (36.3 °C)      /71   Pulse 88   Temp 97.4 °F (36.3 °C) (Axillary)   Resp 15   Ht 182.9 cm (72\")   Wt 130 kg (286 lb 13.1 oz)   SpO2 97%   BMI 38.90 kg/m²         Physical Exam:    General: Patient is sitting in recliner in no acute distress  Respiratory: Effort even and unlabored, is not conversationally dyspneic  Left foot.  Continues to have edema, less erythema laterally.  Then purulent was expressed by applying some upward pressure starting just above the heel.  He did have some tenderness with that as well.  Packing in place left heel.        Results Review:    I reviewed the patient's new clinical results.    Lab Results:    CBC:   Lab Results   Lab 24  0558   WBC 15.48* 12.83* 9.76   HEMOGLOBIN 9.8* 9.0* 10.0*   HEMATOCRIT 29.6* 28.3* 31.8*   PLATELETS 201 176 219        AutoDiff:   Lab Results   Lab 24  0558   NEUTROPHIL % 88.7* 79.2* 71.6   LYMPHOCYTE % 2.9* 10.1* 12.7*   MONOCYTES % 7.1 9.6 8.5   EOSINOPHIL % 0.1* 0.2* 6.3*   BASOPHIL % 0.3 0.3 0.6   NEUTROS ABS 13.73* 10.16* 6.99   LYMPHS ABS 0.45* 1.29 1.24   MONOS ABS 1.10* 1.23* 0.83   EOS ABS 0.01 0.03 0.61*   BASOS ABS 0.05 0.04 0.06        Manual Diff:    Lab Results   Lab 24  0558   NEUTROS ABS 13.73* 10.16* 6.99           CMP:   Lab Results   Lab 24  1849 24  0558   SODIUM 132*   < > 136 138 141   POTASSIUM 4.5   < > 4.0 4.1 3.8   CHLORIDE 97*   < > 103 103 108*   CO2 19.0*   < > 19.0* 21.0* " 19.0*   BUN 55*   < > 51* 50* 37*   CREATININE 2.67*   < > 2.39* 2.28* 1.98*   CALCIUM 9.0   < > 8.7 8.8 8.5*   BILIRUBIN 0.6  --   --   --   --    ALK PHOS 101  --   --   --   --    ALT (SGPT) 8  --   --   --   --    AST (SGOT) 11  --   --   --   --    GLUCOSE 704*   < > 379* 370* 157*    < > = values in this interval not displayed.       Estimated Creatinine Clearance: 49.8 mL/min (A) (by C-G formula based on SCr of 1.98 mg/dL (H)).    Culture Results:    Microbiology Results (last 10 days)       Procedure Component Value - Date/Time    Eosinophil Smear - Urine, Urine, Clean Catch [[id number]]  (Normal) Collected: 08/01/24 1547    Lab Status: Final result Specimen: Urine, Clean Catch Updated: 08/02/24 0149     Eosinophil Smear 0 % EOS/100 Cells     MRSA Screen, PCR (Inpatient) - Swab, Nares [[id number]]  (Normal) Collected: 08/01/24 0206    Lab Status: Final result Specimen: Swab from Nares Updated: 08/01/24 0346     MRSA PCR No MRSA Detected    Narrative:      The negative predictive value of this diagnostic test is high and should only be used to consider de-escalating anti-MRSA therapy. A positive result may indicate colonization with MRSA and must be correlated clinically.    Blood Culture - Blood, Hand, Left [[id number]]  (Normal) Collected: 07/31/24 1849    Lab Status: Preliminary result Specimen: Blood from Hand, Left Updated: 08/01/24 1915     Blood Culture No growth at 24 hours    Blood Culture - Blood, Arm, Left [[id number]]  (Abnormal) Collected: 07/31/24 1849    Lab Status: Final result Specimen: Blood from Arm, Left Updated: 08/02/24 0545     Blood Culture Staphylococcus, coagulase negative     Isolated from Aerobic Bottle     Gram Stain Aerobic Bottle Gram positive cocci in clusters    Narrative:      Probable contaminant requires clinical correlation, susceptibility not performed unless requested by physician.      Blood Culture ID, PCR - Blood, Arm, Left [[id number]]  (Abnormal) Collected: 07/31/24 1849     Lab Status: Final result Specimen: Blood from Arm, Left Updated: 08/01/24 1117     BCID, PCR Staph spp, not aureus or lugdunensis. Identification by BCID2 PCR.     BOTTLE TYPE Aerobic Bottle                 Radiology:   Imaging Results (Last 72 Hours)       Procedure Component Value Units Date/Time    US Renal Bilateral [[id number]] Collected: 08/01/24 1300     Updated: 08/01/24 1305    Narrative:      US RENAL BILATERAL- 8/1/2024 11:19 AM     HISTORY: Acute kidney injury; E11.628-Type 2 diabetes mellitus with  other skin complications; L08.9-Local infection of the skin and  subcutaneous tissue, unspecified; M86.9-Osteomyelitis, unspecified;  E11.10-Type 2 diabetes mellitus with ketoacidosis without coma;  R79.89-Other specified abnormal findings of blood chemistry;  I48.91-Unspecified atrial fibrillation; R41.0-Disorientation,  unspecified     COMPARISON: None available.       TECHNIQUE: Multiple longitudinal and transverse real-time sonographic  images of the kidneys and urinary bladder are obtained. Report and  images stored per institutional and state regulations.           FINDINGS:     Visualized proximal abdominal aorta and IVC are unremarkable.        RIGHT KIDNEY: Right kidney is 10.7 cm in length. Renal cortex is  unremarkable. There is no hydronephrosis. There is an exophytic anechoic  simple right renal cyst measuring 4.9 cm..     LEFT KIDNEY: Left kidney is 11.3 cm in length. Renal cortex is  unremarkable. There is no left hydronephrosis.     PELVIS: Urinary bladder is unremarkable.          Impression:         1. Simple right renal cyst.  2. Otherwise unremarkable kidneys and no hydronephrosis.           This report was signed and finalized on 8/1/2024 1:02 PM by Eran Christina.       CT Abdomen Pelvis With Contrast [[id number]] Collected: 08/01/24 0543     Updated: 08/01/24 0754    Narrative:      EXAMINATION: CT ABDOMEN PELVIS W CONTRAST-      7/31/2024 10:41 PM     HISTORY: abdominal distention  with pain; M86.9-Osteomyelitis,  unspecified; E11.10-Type 2 diabetes mellitus with ketoacidosis without  coma; R79.89-Other specified abnormal findings of blood chemistry;  I48.91-Unspecified atrial fibrillation; R41.0-Disorientation,  unspecified     In order to have a CT radiation dose as low as reasonably achievable  Automated Exposure Control was utilized for adjustment of the mA and/or  KV according to patient size.     Total DLP = 1841.72 mGy.cm     The CT scan of the abdomen and pelvis is performed after intravenous  contrast enhancement.     The images are acquired in axial plane and subsequent reconstruction in  coronal and sagittal planes.     Comparison is made with the previous study dated 6/27/2024.     The lung bases included in the study show a trace right and small left  basal pleural effusion. There are mild atelectatic changes at bilateral  bases left more than the right.     Limited visualized cardiomediastinal structures show atheromatous  changes of the coronary arteries. There is moderate cardiomegaly.     The liver and spleen are normal.     The gallbladder is surgically absent.     Fatty infiltrated pancreas seen. No focal abnormality. No ductal  dilatation. The adrenal glands are normal.     There is persistent bilateral significant perinephric fat infiltration  and thickening of the pararenal fascia which is similar to the previous  study. Bilateral nephrogram is normal and symmetrical. No calculi. No  hydronephrosis. There is a well-defined sharply marginated low density  exophytic mass from the lower pole of the right kidney measuring 4.4 cm  in diameter. CT density suggest a cyst. Limited visualized ureters are  normal and nondilated. The urinary bladder is well distended. No  intrinsic abnormality.     Prostate is not significantly enlarged.     There are small fat-containing inguinal hernias, right larger than the  left.     There is subcutaneous fat infiltration of the entire  abdominal wall  extending into the lower extremity. This may represent a fluid  overload?.     The stomach is decompressed with moderate wall thickening. No focal  abnormality [place]. Duodenum is normal. Small bowel is nondistended and  nondilated. Appendix is surgically absent. There is significant large  volume stool throughout the colon. No finding to suggest obstruction.     Atheromatous changes of the abdominal aorta and iliac arteries. No  aneurysmal dilatation.     Moderately prominent nonspecific retroperitoneal para-aortic lymph nodes  predominantly in the mid abdomen. A referenced left para iliac lymph  node, image #57 in series 2 and image #40 and series 3, measures 1.6 cm  in short axis. There is a large lymph node in the left lower anterior  pelvis/external iliac group measuring 1.3 cm in short axis. There are  moderately prominent inguinal lymph nodes. A left inguinal lymph node  measures 2 cm in short axis.     Images reviewed in bone window show chronic degenerative changes of the  lumbar spine. No acute bony abnormality.       Impression:      1. A significant perinephric fat stranding is similar to the previous  study and is a nonspecific finding. Possibility for chronic inflammatory  process/chronic pyelonephritis may not be excluded. Renal functions are  normal and symmetrical.  2. Fatty infiltration of the pancreas. No mass. No ductal dilatation.  3. Nonspecific abdominal, pelvic and inguinal lymphadenopathy. The  etiology and clinical significance is not certain. This appears  moderately more progressive since the previous study.  4. Diffuse subcutaneous fat infiltration of the abdominal wall extending  into the lower extremity may represent fluid overload?.  5. A significant large volume of stool in the colon without evidence of  obstruction. This may represent constipation.     The above study was initially reviewed and reported by [facility]. I do not  find any discrepancies.                                 This report was signed and finalized on 8/1/2024 7:50 AM by Dr. Carlos Cutler MD.       CT Head Without Contrast [[id number]] Collected: 08/01/24 0524     Updated: 08/01/24 0531    Narrative:      EXAMINATION: CT HEAD WO CONTRAST-      7/31/2024 10:41 PM     HISTORY: altered mental status; M86.9-Osteomyelitis, unspecified;  E11.10-Type 2 diabetes mellitus with ketoacidosis without coma;  R79.89-Other specified abnormal findings of blood chemistry;  I48.91-Unspecified atrial fibrillation; R41.0-Disorientation,  unspecified     In order to have a CT radiation dose as low as reasonably achievable  Automated Exposure Control was utilized for adjustment of the mA and/or  KV according to patient size.     Total DLP = 783.72 mGy.cm     The CT scan of the head is performed without intravenous contrast  enhancement.     The images are acquired in axial plane and subsequent reconstruction  with coronal and sagittal planes.     Comparison is made with the previous study dated 5/3/2024.     There is no evidence of a mass. There is no midline shift.     There is no evidence of intracranial hemorrhage or hematoma.     Moderately dilated ventricles, basal cisterns and the cortical sulci are  similar to the previous study representing chronic volume loss.     Areas of chronic white matter ischemia bilaterally are noted. The  gray-white matter differentiation is maintained.     Posterior fossa structures are normal.     The images reviewed in bone window show no acute displaced skull  fracture. A subtle nondisplaced fracture or lesion may be obscured due  to motion artifacts. Large mucous retention cyst is seen in the right  maxillary antrum. The remaining paranasal sinuses and mastoid air cells  are clear.       Impression:      1. No acute intracranial abnormality.  2. Chronic ischemic and atrophic changes.  3. Chronic maxillary sinusitis.     The above study was initially reviewed and reported by [facility]. I do  not  find any discrepancies.                                      This report was signed and finalized on 8/1/2024 5:27 AM by Dr. Carlos Cutler MD.       XR Foot 3+ View Left [[id number]] Collected: 07/31/24 1941     Updated: 07/31/24 1950    Narrative:      EXAMINATION:  XR FOOT 3+ VW LEFT-  7/31/2024 6:22 PM     HISTORY: Heel wound.     COMPARISON: 3/26/2024.     TECHNIQUE: 3 views were obtained.     FINDINGS: There is a deep ulcer on the sole of the foot posteriorly. The  ulcer appears to be packed with some type of radiopaque material. On the  lateral image, there may be a small area of bony erosion involving the  calcaneus just posterior to the plantar calcaneal spur. A small area of  osteomyelitis is not ruled out. There has been prior amputation of the  fifth toe and distal fifth metatarsal. There may have been prior  resection of the distal fourth metacarpal and a portion of the proximal  phalanx of the fourth toe versus chronic erosive change. The appearance  is stable. There is severe narrowing of the first MTP joint. There is  narrowing of some of the interphalangeal joints. There is some spurring  in the tarsal region and at the tarsal-metatarsal junction. There is  soft tissue swelling of the foot diffusely. There is a small curvilinear  foreign body in the soft tissues along the sole of the foot in the  second toe region in the proximal phalanx area. There is another small  linear foreign body in the soft tissues adjacent to the distal phalanx  of the great toe.          Impression:      1. Deep ulcer on the sole of the foot posteriorly near the calcaneus.  There is a questionable small area of bony erosion along the plantar  surface of the calcaneus just proximal to the plantar calcaneal spur.  Osteomyelitis cannot be ruled out. The soft tissue ulcer is packed with  some type of radiopaque material.  2. Linear foreign bodies projected over the soft tissues of the second  toe and first toe.  Artifacts are also included in the differential.  3. Other chronic changes, as discussed.        This report was signed and finalized on 7/31/2024 7:47 PM by Dr. Raz Mendes MD.       XR Chest 1 View [[id number]] Collected: 07/31/24 1940     Updated: 07/31/24 1944    Narrative:      EXAMINATION:  XR CHEST 1 VW-  7/31/2024 6:22 PM     HISTORY: Altered mental status. Hypertension and diabetes.     COMPARISON: 6/28/2024.     TECHNIQUE: Single view AP image.     FINDINGS: There is hypoventilation with vascular crowding. There is mild  bronchial wall thickening, stable. There is no dense infiltrate or  effusion. Heart size is borderline. Prior heart bypass surgery. Prior  cervical fusion. No definite acute bony abnormality.          Impression:      1. Hypoventilation with vascular crowding.  2. Mild bronchial wall thickening, stable.           This report was signed and finalized on 7/31/2024 7:41 PM by Dr. Raz Mendes MD.                   Active Hospital Problems    Diagnosis     **DKA, type 2, not at goal        IMPRESSION:  Diabetic foot infection and patient with history of osteomyelitis with MRSA status posttreatment with vancomycin and linezolid.  Patient has open wound to left heel but also lateral edema, erythema (improved somewhat today) and purulent drainage expressed today.  Uncontrolled diabetes mellitus with hemoglobin A1c greater than 12.  Glucose 700 on admission  Abdominal pain complaints on admission patient has previously had GI workup, colonoscopy attempt last month with inadequate prep.  CT of the abdomen unremarkable  Chronic kidney disease stage IIIb with acute kidney injury-creatinine continues to improve.  Leukocytosis-resolved      RECOMMENDATION:   Continue vancomycin given history of MRSA infection left foot including osteomyelitis  Discontinue Zosyn  Consult podiatry  Placed order for culture of left foot.  Discussed with ELVIS Hoffmann prepping area with ChloraPrep, allowed to dry and  expressing some drainage for culture.  Continue to monitor renal function.  Patient has tolerated linezolid in the past if renal function, vancomycin dosing becomes an issue.  Diabetes management per attending      Discussed with ELVIS Hoffmann MD  08/02/24  09:39 CDT        Electronically signed by Julia Adrian MD at 08/02/24 0959       Stewart Alvarez APRN at 08/02/24 0839          Nephrology ([facility]) Progress Note      Patient:  Erick Luong  YOB: 1955  Date of Service: 8/2/2024  MRN: [id number]   Acct: [id number]   Primary Care Physician: Del Shetty MD  Advance Directive:   Code Status and Medical Interventions: CPR (Attempt to Resuscitate); Full Support   Ordered at: 08/01/24 0053     Level Of Support Discussed With:    Patient     Code Status (Patient has no pulse and is not breathing):    CPR (Attempt to Resuscitate)     Medical Interventions (Patient has pulse or is breathing):    Full Support     Admit Date: 7/31/2024       Hospital Day: 2  Referring Provider: Abebe Garzon,       Patient personally seen and examined.  Complete chart including Consults, Notes, Operative Reports, Labs, Cardiology, and Radiology studies reviewed as able.        Subjective:  Erick Luong is a 69 y.o. male for whom we were consulted for evaluation and treatment of acute kidney injury. Baseline chronic kidney disease stage 3b. Baseline creatinine approximately 1.5-1.8. Follows in our office.  History of poorly controlled type 2 diabetes, hypertension, coronary artery disease, diabetic foot ulcer, obesity.  On 7/31 patient was found wandering at home, confused. Brought to ER for evaluation. Has a nonhealing left foot diabetic ulcer with serosanguineous drainage. Blood glucose level was >700 with A1c >12%. Initial creatinine was 2.67 and has steadily improved since he was admitted. Nephrology consulted on 8/01. Hospital course  remarkable for improving renal function and improved blood glucose levels.     Today is awake and alert. Complaint of general malaise/feeling poorly. Urine output nonoliguric    Allergies:  Cefepime, Bactrim [sulfamethoxazole-trimethoprim], Vancomycin, Zolpidem, and Metronidazole    Home Meds:  Medications Prior to Admission   Medication Sig Dispense Refill Last Dose    ascorbic acid (VITAMIN C) 1000 MG tablet Take 1 tablet by mouth Daily. 30 tablet 3     bumetanide (BUMEX) 1 MG tablet Take 1 tablet by mouth 2 (Two) Times a Day for 30 days. 60 tablet 0     busPIRone (BUSPAR) 10 MG tablet Take 1 tablet by mouth 2 (Two) Times a Day.       calcitriol (ROCALTROL) 0.5 MCG capsule Take 1 capsule by mouth Daily. 90 capsule 4     carvedilol (COREG) 3.125 MG tablet Take 1 tablet twice a day by oral route. 60 tablet 4     donepezil (ARICEPT) 10 MG tablet Take 1 tablet by mouth Daily. 90 tablet 1     DULoxetine (CYMBALTA) 60 MG capsule Take 1 capsule by mouth Daily. 90 capsule 4     famotidine (PEPCID) 20 MG tablet Take 1 tablet twice a day by oral route. 180 tablet 4     Insulin Glargine (Lantus SoloStar) 100 UNIT/ML injection pen Inject 20 Units under the skin into the appropriate area as directed every night at bedtime. 15 mL 3     Insulin Regular Human, Conc, (HumuLIN R) 500 UNIT/ML solution pen-injector CONCENTRATED injection Inject 40 Units under the skin into the appropriate area as directed 2 (Two) Times a Day Before Meals. Inject 40 units under the skin in the the appropriate area with regular meals AND 40 units with large meals.       Iron-Vitamin C (Vitron-C)  MG tablet Take 1 tablet twice a day by oral route. 60 tablet 2     levocetirizine (XYZAL) 5 MG tablet Take 1 tablet by mouth every day at bedtime. 30 tablet 2     melatonin 3 MG tablet Take 2 tablets by mouth At Night As Needed for Sleep. 30 tablet 0     oxyCODONE (ROXICODONE) 10 MG tablet Take 1 tablet by mouth 2 (Two) Times a Day As Needed. Must last  30 days per md. 55 tablet 0     pantoprazole (PROTONIX) 40 MG EC tablet Take 1 tablet by mouth Every 12 (Twelve) Hours. 180 tablet 4     pregabalin (LYRICA) 100 MG capsule Take 1 capsule by mouth Daily.       pregabalin (LYRICA) 100 MG capsule Take 2 capsules by mouth every night at bedtime.       rosuvastatin (CRESTOR) 10 MG tablet Take 1 tablet by mouth Every Night. 30 tablet 2     sucralfate (CARAFATE) 1 g tablet Take 1 tablet by mouth 4 (Four) Times a Day before meals. 360 tablet 1     Diclofenac Sodium (VOLTAREN) 1 % gel gel Apply 2 g topically to the appropriate area as directed 4 (Four) Times a Day As Needed. 300 g 11     nitroglycerin (NITROSTAT) 0.4 MG SL tablet Place 1 tablet under the tongue Every 5 (Five) Minutes As Needed for Chest Pain. Take no more than 3 doses in 15 minutes.       polyethylene glycol (MIRALAX) 17 GM/SCOOP powder Take 17 g by mouth Daily As Needed (constipation).       sennosides-docusate (PERICOLACE) 8.6-50 MG per tablet Take 1 tablet by mouth Every Night. Obtain OTC          Medicines:  Current Facility-Administered Medications   Medication Dose Route Frequency Provider Last Rate Last Admin    sennosides-docusate (PERICOLACE) 8.6-50 MG per tablet 2 tablet  2 tablet Oral BID Lamine Figueroa APRN   2 tablet at 08/01/24 2048    And    polyethylene glycol (MIRALAX) packet 17 g  17 g Oral Daily PRN Lamine Figueroa APRN        And    bisacodyl (DULCOLAX) EC tablet 5 mg  5 mg Oral Daily PRN Lamine Figueroa APRN        And    bisacodyl (DULCOLAX) suppository 10 mg  10 mg Rectal Daily PRN Lamine Figueroa APRN        Calcium Replacement - Follow Nurse / BPA Driven Protocol   Does not apply PRN Abebe Garzon DO        Chlorhexidine Gluconate Cloth 2 % pads 1 Application  1 Application Topical Q24H Lamine Figueroa APRN   1 Application at 08/02/24 0431    dextrose (D50W) (25 g/50 mL) IV injection 10-50 mL  10-50 mL Intravenous Q15 Min PRN Abebe Garzon DO        dextrose (D50W)  (25 g/50 mL) IV injection 25 g  25 g Intravenous Q15 Min PRN Lamine Figueroa APRN        dextrose (GLUTOSE) oral gel 15 g  15 g Oral Q15 Min PRN Lamine Figueroa APRN        Enoxaparin Sodium (LOVENOX) syringe 135 mg  1 mg/kg Subcutaneous Q12H Abebe Garzon DO   135 mg at 08/01/24 2053    glucagon (GLUCAGEN) injection 1 mg  1 mg Intramuscular Q15 Min PRN Lamine Figueroa APRN        insulin glargine (LANTUS, SEMGLEE) injection 10 Units  10 Units Subcutaneous Daily Keren Jamison MD   10 Units at 08/01/24 0954    insulin regular (humuLIN R,novoLIN R) injection 3-14 Units  3-14 Units Subcutaneous 4x Daily AC & at Bedtime Keren Jamison MD   3 Units at 08/02/24 0752    Magnesium Standard Dose Replacement - Follow Nurse / BPA Driven Protocol   Does not apply PRN Abebe Garzon DO        mupirocin (BACTROBAN) 2 % nasal ointment 1 Application  1 Application Each Nare BID Lamine Figueroa APRN   1 Application at 08/01/24 2048    nitroglycerin (NITROSTAT) SL tablet 0.4 mg  0.4 mg Sublingual Q5 Min PRN Lamine Figueroa APRN        oxyCODONE (ROXICODONE) immediate release tablet 10 mg  10 mg Oral BID Lamine Figueroa APRN   10 mg at 08/01/24 2048    Pharmacy to Dose enoxaparin (LOVENOX)   Does not apply Continuous PRN Abebe Garzon DO        Pharmacy to Dose Zosyn   Does not apply Continuous PRN Lamine Figueroa APRN        Phosphorus Replacement - Follow Nurse / BPA Driven Protocol   Does not apply PRN Abebe Garzon DO        piperacillin-tazobactam (ZOSYN) 3.375 g IVPB in 100 mL NS MBP (CD)  3.375 g Intravenous Q8H Lamine Figueroa APRN   3.375 g at 08/02/24 0210    Potassium Replacement - Follow Nurse / BPA Driven Protocol   Does not apply PRN Abebe Garzon DO        pregabalin (LYRICA) capsule 100 mg  100 mg Oral Nightly Reuben Garza APRN        pregabalin (LYRICA) capsule 50 mg  50 mg Oral Daily Reuben Garza APRN        sodium chloride 0.9 % flush 10 mL  10 mL Intravenous Q12H  Abebe Garzon, DO   10 mL at 08/01/24 2048    sodium chloride 0.9 % flush 10 mL  10 mL Intravenous PRN bAebe Garzon, DO        sodium chloride 0.9 % flush 10 mL  10 mL Intravenous Q12H Lamine Figueroa APRN   10 mL at 08/01/24 2048    sodium chloride 0.9 % flush 10 mL  10 mL Intravenous PRN Lamine Figueroa APRN        sodium chloride 0.9 % infusion 40 mL  40 mL Intravenous PRN Abebe Garzon, DO        sodium chloride 0.9 % infusion 40 mL  40 mL Intravenous PRN Lamine Figueroa APRN        sodium chloride 0.9 % infusion  75 mL/hr Intravenous Continuous Brian Lomas MD 75 mL/hr at 08/02/24 0154 75 mL/hr at 08/02/24 0154    sodium hypochlorite (DAKIN'S 1/4 STRENGTH) 0.125 % topical solution 0.125% solution   Topical Q12H Aby High APRN   Given at 08/01/24 2048    vancomycin 750 mg/250 mL 0.9% NS IVPB ([facility])  750 mg Intravenous Q24H Lamine Figueroa APRN   750 mg at 08/01/24 2054       Past Medical History:  Past Medical History:   Diagnosis Date    Arthritis     Autonomic disease     CAD (coronary artery disease) 02/06/2017    Cervical radiculopathy 09/16/2021    Chronic constipation with acute exaccerbation 05/10/2021    Coronary artery disease     Degeneration of cervical intervertebral disc 08/11/2021    Diabetes mellitus     Diabetic foot ulcer 08/31/2020    Diabetic polyneuropathy associated with type 2 diabetes mellitus 01/18/2021    Elevated cholesterol     Gastroesophageal reflux disease 05/13/2019    Headache     HTN (hypertension), benign 05/03/2017    Hyperlipidemia     Hypertension     Mixed hyperlipidemia 02/07/2017    Multiple lung nodules 01/26/2020    5mm, 9 mm RLL identified 1/2020, not present 10/2019.    Myocardial infarction     Osteomyelitis 01/22/2020    Osteomyelitis of fifth toe of right foot 10/07/2019    Pancreatitis     Persistent insomnia 01/20/2020    Renal disorder     Sleep apnea 02/06/2017    Sleep apnea with use of continuous positive airway pressure (CPAP)      NON-COMPLIANT    Slow transit constipation 01/16/2019    Spinal stenosis in cervical region 09/16/2021    Vitamin D deficiency 03/02/2021       Past Surgical History:  Past Surgical History:   Procedure Laterality Date    ABDOMINAL SURGERY      AMPUTATION FOOT / TOE Left 10/2021    5th digit     ANTERIOR CERVICAL DISCECTOMY W/ FUSION N/A 08/05/2022    Procedure: CERVICAL DISCECTOMY ANTERIOR WITH FUSION C5-6 with possible plating of C5-7 with neuromonitoring and 1 c-arm;  Surgeon: Karel Soliz MD;  Location: [facility] OR;  Service: Neurosurgery;  Laterality: N/A;    APPENDECTOMY      BACK SURGERY      CARDIAC CATHETERIZATION Left 02/08/2021    Procedure: Left Heart Cath w poss intervention left anatomical snuff box acess;  Surgeon: Omkar Charles DO;  Location: [facility] CATH INVASIVE LOCATION;  Service: Cardiology;  Laterality: Left;    CARDIAC SURGERY      CATARACT EXTRACTION      CERVICAL SPINE SURGERY      COLONOSCOPY N/A 01/31/2017    Normal exam repeat in 5 years    COLONOSCOPY N/A 02/11/2019    Mild acute inflammation    COLONOSCOPY N/A 04/07/2024    2 areas at 10 and 20 cm with friability ulceration 2 clips placed at 20 cm and 4 clips at 10 cm poor prep normal mucosa,mild eroisions and ulcerations in visible vessels    COLONOSCOPY N/A 7/1/2024    Procedure: COLONOSCOPY WITH ANESTHESIA;  Surgeon: Arsalan Lorenzo DO;  Location: [facility] ENDOSCOPY;  Service: Gastroenterology;  Laterality: N/A;  pre op constipation/diarrhea  post poor prep  pcp Del Shetty    COLONOSCOPY N/A 7/2/2024    Procedure: COLONOSCOPY WITH ANESTHESIA;  Surgeon: Agapito Christopher MD;  Location: [facility] ENDOSCOPY;  Service: Gastroenterology;  Laterality: N/A;  pre rectal bleeding  post poor prep  pcp Del Shetty MD    COLONOSCOPY W/ POLYPECTOMY  03/04/2014    Hyperplastic polyp    CORONARY ARTERY BYPASS GRAFT  10/2015    ENDOSCOPY  04/13/2011    Gastritis with hemorrhage    ENDOSCOPY N/A 05/05/2017    Normal exam     ENDOSCOPY N/A 2019    Gastritis    ENDOSCOPY N/A 2020    Non-erosive gastritis with hemorrhage    ENDOSCOPY N/A 02/10/2021    Esophagitis    ENDOSCOPY N/A 2024    There were esophageal mucosal changes suspicious for short-segment Low's esophagus present in the distal esophagus. The maximum longitudinal extent of these mucosal changes was 2 cm in length. Mucosa was biopsied with a cold forceps for histologyDistal esophagus, biopsies: Mild chronic active esophagogastritis. No evidence of intestinal metaplasia, dysplasia. Antrum, bx, Mild chronic gastritis    FOOT SURGERY Left     INCISION AND DRAINAGE OF WOUND Left 2007    spider bite       Family History  Family History   Problem Relation Age of Onset    Colon cancer Father     Heart disease Father     Colon cancer Sister     Colon polyps Sister     Alzheimer's disease Mother     Coronary artery disease Sister     Coronary artery disease Sister        Social History  Social History     Socioeconomic History    Marital status:    Tobacco Use    Smoking status: Former     Current packs/day: 0.00     Types: Cigarettes     Quit date:      Years since quittin.6    Smokeless tobacco: Never    Tobacco comments:     [place]   Vaping Use    Vaping status: Never Used   Substance and Sexual Activity    Alcohol use: No    Drug use: No    Sexual activity: Defer       Review of Systems:  History obtained from chart review and the patient  General ROS: No fever or chills  Respiratory ROS: No cough, shortness of breath, wheezing  Cardiovascular ROS: No chest pain or palpitations  Gastrointestinal ROS: No abdominal pain or melena  Genito-Urinary ROS: No dysuria or hematuria  Psych ROS: No anxiety and depression  14 point ROS reviewed with the patient and negative except as noted above and in the HPI unless unable to obtain.    Objective:  Patient Vitals for the past 24 hrs:   BP Temp Temp src Pulse Resp SpO2   24 0500 133/74  -- -- 82 -- 90 %   08/02/24 0300 123/74 -- -- 80 -- 94 %   08/02/24 0200 138/72 -- -- 82 -- 92 %   08/02/24 0100 99/66 -- -- 75 -- 98 %   08/02/24 0000 126/67 96.9 °F (36.1 °C) Axillary 81 14 97 %   08/01/24 2300 110/63 -- -- 90 -- 96 %   08/01/24 2200 120/61 -- -- 82 -- 97 %   08/01/24 2100 110/68 -- -- 84 -- 100 %   08/01/24 2000 106/52 96.9 °F (36.1 °C) Axillary 72 16 96 %   08/01/24 1900 125/63 -- -- 76 -- 98 %   08/01/24 1820 104/54 -- -- 76 -- 99 %   08/01/24 1720 109/60 -- -- 71 -- 97 %   08/01/24 1708 90/55 -- -- 70 -- 98 %   08/01/24 1600 102/56 96.9 °F (36.1 °C) Axillary 69 -- 100 %   08/01/24 1500 130/68 -- -- 69 -- 100 %   08/01/24 1430 119/73 -- -- 67 -- 98 %   08/01/24 1400 131/73 -- -- 67 -- 99 %   08/01/24 1330 120/67 -- -- 70 -- 96 %   08/01/24 1300 125/79 -- -- 71 -- 95 %   08/01/24 1230 133/78 -- -- 67 -- 98 %   08/01/24 1200 130/80 96.1 °F (35.6 °C) Axillary 70 -- 98 %   08/01/24 1130 135/87 -- -- 69 -- 95 %   08/01/24 1100 131/74 -- -- 81 -- 100 %   08/01/24 1030 139/84 -- -- 73 -- 97 %   08/01/24 0945 137/76 -- -- 77 -- 98 %       Intake/Output Summary (Last 24 hours) at 8/2/2024 0839  Last data filed at 8/2/2024 0400  Gross per 24 hour   Intake 2795 ml   Output 2350 ml   Net 445 ml     General: awake/alert   Chest:  clear to auscultation bilaterally without respiratory distress  CVS: regular rate and rhythm  Abdominal: soft, nontender, positive bowel sounds  Extremities: no cyanosis or edema  Skin:  left foot ulcer and warm and dry without rash      Labs:  Results from last 7 days   Lab Units 08/02/24  0558 08/01/24  0419 07/31/24  1849   WBC 10*3/mm3 9.76 12.83* 15.48*   HEMOGLOBIN g/dL 10.0* 9.0* 9.8*   HEMATOCRIT % 31.8* 28.3* 29.6*   PLATELETS 10*3/mm3 219 176 201         Results from last 7 days   Lab Units 08/02/24  0558 08/01/24  0419 08/01/24  0210 07/31/24  2030 07/31/24  1849   SODIUM mmol/L 141 138 136   < > 132*   POTASSIUM mmol/L 3.8 4.1 4.0   < > 4.5   CHLORIDE mmol/L 108* 103  103   < > 97*   CO2 mmol/L 19.0* 21.0* 19.0*   < > 19.0*   BUN mg/dL 37* 50* 51*   < > 55*   CREATININE mg/dL 1.98* 2.28* 2.39*   < > 2.67*   CALCIUM mg/dL 8.5* 8.8 8.7   < > 9.0   EGFR mL/min/1.73 36.1* 30.5* 28.8*   < > 25.2*   BILIRUBIN mg/dL  --   --   --   --  0.6   ALK PHOS U/L  --   --   --   --  101   ALT (SGPT) U/L  --   --   --   --  8   AST (SGOT) U/L  --   --   --   --  11   GLUCOSE mg/dL 157* 370* 379*   < > 704*    < > = values in this interval not displayed.       Radiology:   Imaging Results (Last 72 Hours)       Procedure Component Value Units Date/Time    US Renal Bilateral [[id number]] Collected: 08/01/24 1300     Updated: 08/01/24 1305    Narrative:      US RENAL BILATERAL- 8/1/2024 11:19 AM     HISTORY: Acute kidney injury; E11.628-Type 2 diabetes mellitus with  other skin complications; L08.9-Local infection of the skin and  subcutaneous tissue, unspecified; M86.9-Osteomyelitis, unspecified;  E11.10-Type 2 diabetes mellitus with ketoacidosis without coma;  R79.89-Other specified abnormal findings of blood chemistry;  I48.91-Unspecified atrial fibrillation; R41.0-Disorientation,  unspecified     COMPARISON: None available.       TECHNIQUE: Multiple longitudinal and transverse real-time sonographic  images of the kidneys and urinary bladder are obtained. Report and  images stored per institutional and state regulations.           FINDINGS:     Visualized proximal abdominal aorta and IVC are unremarkable.        RIGHT KIDNEY: Right kidney is 10.7 cm in length. Renal cortex is  unremarkable. There is no hydronephrosis. There is an exophytic anechoic  simple right renal cyst measuring 4.9 cm..     LEFT KIDNEY: Left kidney is 11.3 cm in length. Renal cortex is  unremarkable. There is no left hydronephrosis.     PELVIS: Urinary bladder is unremarkable.          Impression:         1. Simple right renal cyst.  2. Otherwise unremarkable kidneys and no hydronephrosis.           This report was signed and  finalized on 8/1/2024 1:02 PM by Eran Christina.       CT Abdomen Pelvis With Contrast [[id number]] Collected: 08/01/24 0543     Updated: 08/01/24 0754    Narrative:      EXAMINATION: CT ABDOMEN PELVIS W CONTRAST-      7/31/2024 10:41 PM     HISTORY: abdominal distention with pain; M86.9-Osteomyelitis,  unspecified; E11.10-Type 2 diabetes mellitus with ketoacidosis without  coma; R79.89-Other specified abnormal findings of blood chemistry;  I48.91-Unspecified atrial fibrillation; R41.0-Disorientation,  unspecified     In order to have a CT radiation dose as low as reasonably achievable  Automated Exposure Control was utilized for adjustment of the mA and/or  KV according to patient size.     Total DLP = 1841.72 mGy.cm     The CT scan of the abdomen and pelvis is performed after intravenous  contrast enhancement.     The images are acquired in axial plane and subsequent reconstruction in  coronal and sagittal planes.     Comparison is made with the previous study dated 6/27/2024.     The lung bases included in the study show a trace right and small left  basal pleural effusion. There are mild atelectatic changes at bilateral  bases left more than the right.     Limited visualized cardiomediastinal structures show atheromatous  changes of the coronary arteries. There is moderate cardiomegaly.     The liver and spleen are normal.     The gallbladder is surgically absent.     Fatty infiltrated pancreas seen. No focal abnormality. No ductal  dilatation. The adrenal glands are normal.     There is persistent bilateral significant perinephric fat infiltration  and thickening of the pararenal fascia which is similar to the previous  study. Bilateral nephrogram is normal and symmetrical. No calculi. No  hydronephrosis. There is a well-defined sharply marginated low density  exophytic mass from the lower pole of the right kidney measuring 4.4 cm  in diameter. CT density suggest a cyst. Limited visualized ureters are  normal  and nondilated. The urinary bladder is well distended. No  intrinsic abnormality.     Prostate is not significantly enlarged.     There are small fat-containing inguinal hernias, right larger than the  left.     There is subcutaneous fat infiltration of the entire abdominal wall  extending into the lower extremity. This may represent a fluid  overload?.     The stomach is decompressed with moderate wall thickening. No focal  abnormality Alamast. Duodenum is normal. Small bowel is nondistended and  nondilated. Appendix is surgically absent. There is significant large  volume stool throughout the colon. No finding to suggest obstruction.     Atheromatous changes of the abdominal aorta and iliac arteries. No  aneurysmal dilatation.     Moderately prominent nonspecific retroperitoneal para-aortic lymph nodes  predominantly in the mid abdomen. A referenced left para iliac lymph  node, image #57 in series 2 and image #40 and series 3, measures 1.6 cm  in short axis. There is a large lymph node in the left lower anterior  pelvis/external iliac group measuring 1.3 cm in short axis. There are  moderately prominent inguinal lymph nodes. A left inguinal lymph node  measures 2 cm in short axis.     Images reviewed in bone window show chronic degenerative changes of the  lumbar spine. No acute bony abnormality.       Impression:      1. A significant perinephric fat stranding is similar to the previous  study and is a nonspecific finding. Possibility for chronic inflammatory  process/chronic pyelonephritis may not be excluded. Renal functions are  normal and symmetrical.  2. Fatty infiltration of the pancreas. No mass. No ductal dilatation.  3. Nonspecific abdominal, pelvic and inguinal lymphadenopathy. The  etiology and clinical significance is not certain. This appears  moderately more progressive since the previous study.  4. Diffuse subcutaneous fat infiltration of the abdominal wall extending  into the lower extremity may  represent fluid overload?.  5. A significant large volume of stool in the colon without evidence of  obstruction. This may represent constipation.     The above study was initially reviewed and reported by [employer]. I do not  find any discrepancies.                                This report was signed and finalized on 8/1/2024 7:50 AM by Dr. Carlos Cutler MD.       CT Head Without Contrast [[id number]] Collected: 08/01/24 0524     Updated: 08/01/24 0531    Narrative:      EXAMINATION: CT HEAD WO CONTRAST-      7/31/2024 10:41 PM     HISTORY: altered mental status; M86.9-Osteomyelitis, unspecified;  E11.10-Type 2 diabetes mellitus with ketoacidosis without coma;  R79.89-Other specified abnormal findings of blood chemistry;  I48.91-Unspecified atrial fibrillation; R41.0-Disorientation,  unspecified     In order to have a CT radiation dose as low as reasonably achievable  Automated Exposure Control was utilized for adjustment of the mA and/or  KV according to patient size.     Total DLP = 783.72 mGy.cm     The CT scan of the head is performed without intravenous contrast  enhancement.     The images are acquired in axial plane and subsequent reconstruction  with coronal and sagittal planes.     Comparison is made with the previous study dated 5/3/2024.     There is no evidence of a mass. There is no midline shift.     There is no evidence of intracranial hemorrhage or hematoma.     Moderately dilated ventricles, basal cisterns and the cortical sulci are  similar to the previous study representing chronic volume loss.     Areas of chronic white matter ischemia bilaterally are noted. The  gray-white matter differentiation is maintained.     Posterior fossa structures are normal.     The images reviewed in bone window show no acute displaced skull  fracture. A subtle nondisplaced fracture or lesion may be obscured due  to motion artifacts. Large mucous retention cyst is seen in the right  maxillary antrum. The  remaining paranasal sinuses and mastoid air cells  are clear.       Impression:      1. No acute intracranial abnormality.  2. Chronic ischemic and atrophic changes.  3. Chronic maxillary sinusitis.     The above study was initially reviewed and reported by [facility]. I do not  find any discrepancies.                                      This report was signed and finalized on 8/1/2024 5:27 AM by Dr. Carlos Cutler MD.       XR Foot 3+ View Left [[id number]] Collected: 07/31/24 1941     Updated: 07/31/24 1950    Narrative:      EXAMINATION:  XR FOOT 3+ VW LEFT-  7/31/2024 6:22 PM     HISTORY: Heel wound.     COMPARISON: 3/26/2024.     TECHNIQUE: 3 views were obtained.     FINDINGS: There is a deep ulcer on the sole of the foot posteriorly. The  ulcer appears to be packed with some type of radiopaque material. On the  lateral image, there may be a small area of bony erosion involving the  calcaneus just posterior to the plantar calcaneal spur. A small area of  osteomyelitis is not ruled out. There has been prior amputation of the  fifth toe and distal fifth metatarsal. There may have been prior  resection of the distal fourth metacarpal and a portion of the proximal  phalanx of the fourth toe versus chronic erosive change. The appearance  is stable. There is severe narrowing of the first MTP joint. There is  narrowing of some of the interphalangeal joints. There is some spurring  in the tarsal region and at the tarsal-metatarsal junction. There is  soft tissue swelling of the foot diffusely. There is a small curvilinear  foreign body in the soft tissues along the sole of the foot in the  second toe region in the proximal phalanx area. There is another small  linear foreign body in the soft tissues adjacent to the distal phalanx  of the great toe.          Impression:      1. Deep ulcer on the sole of the foot posteriorly near the calcaneus.  There is a questionable small area of bony erosion along the  plantar  surface of the calcaneus just proximal to the plantar calcaneal spur.  Osteomyelitis cannot be ruled out. The soft tissue ulcer is packed with  some type of radiopaque material.  2. Linear foreign bodies projected over the soft tissues of the second  toe and first toe. Artifacts are also included in the differential.  3. Other chronic changes, as discussed.        This report was signed and finalized on 7/31/2024 7:47 PM by Dr. Raz Mendes MD.       XR Chest 1 View [[id number]] Collected: 07/31/24 1940     Updated: 07/31/24 1944    Narrative:      EXAMINATION:  XR CHEST 1 VW-  7/31/2024 6:22 PM     HISTORY: Altered mental status. Hypertension and diabetes.     COMPARISON: 6/28/2024.     TECHNIQUE: Single view AP image.     FINDINGS: There is hypoventilation with vascular crowding. There is mild  bronchial wall thickening, stable. There is no dense infiltrate or  effusion. Heart size is borderline. Prior heart bypass surgery. Prior  cervical fusion. No definite acute bony abnormality.          Impression:      1. Hypoventilation with vascular crowding.  2. Mild bronchial wall thickening, stable.           This report was signed and finalized on 7/31/2024 7:41 PM by Dr. Raz Mendes MD.               Culture:  Blood Culture   Date Value Ref Range Status   07/31/2024 No growth at 24 hours  Preliminary   07/31/2024 Staphylococcus, coagulase negative (C)  Final         Assessment    Acute kidney injury, ATN--improving  Baseline chronic kidney disease stage 3b  Type 2 diabetes  Hypertension  Sepsis related to diabetic foot ulcer  Anemia of CKD  Obesity     Plan:   Continue IV fluids  Renal function improving, not quite back to baseline level yet.  Monitor labs      SUSAN Calvert  8/2/2024  08:39 CDT      Electronically signed by Stewart Alvarez APRN at 08/02/24 0848       Keren Jamison MD at 08/01/24 1150              [facility] Intensivist  Services  Progress Note    Date of Admission: 7/31/2024  Primary Care Physician: Del Shetty MD    Subjective     Chief Complaint: DKA, type 2, not at goal     History of Present Illness    Mr. Luong is a 69-year-old male who presented to [facility] ER via EMS for altered mental status.  It was reported to ER physician by EMS that the patient was was found by his son confused and wandering around in the garage.  Unfortunately family was not available for additional information.   HPI obtained from ER physician, Dr. Dee, who advises that patient presented soiled, confused and unable to provided events leading to his reason for EMS transport.      ER course workup significant for: CBC with elevated WBC of 15.4, low hemoglobin 9.8 and hematocrit 29.6, elevated ESR 57, elevated CRP of 17.  CMP with low sodium 133, elevated BUN of 54 and elevated creatinine 2.64, elevated glucose 704, urine ketones and small amount of acetone. Elevated lactate 2.4 with delta 2.2.  Elevated HS troponin T 342 delta 353.   Urinalysis: Trace blood, positive nitrite, negative leukocytes, +2 bacteria.   CXR: Low lung volumes and vascular crowding.  X-ray of left foot: Ulcer on the sole of the foot posteriorly near the calcaneus.  There is questionable small area of bony erosion along the plantar surface of the calcaneus just proximal to the plantar calcaneal spur.  Osteomyelitis cannot be ruled out.   EKG: A-fib with rapid ventricular response with rate of 128 bpm.      The ICU team was asked to evaluate the patient for AMS, AFIB-RVR, DKA, and open wound to left foot concerning for possible osteomyelitis.      8/1/2024  Patient is more awake this morning and less confused, patient is hemodynamically stable without pressors    Blood cultures has shown gram-positive cocci patient is on Vanco and Zosyn    ID is following him    He is not still in A-fib but his rate controlled patient initially was on diltiazem drip that was stopped    We have  also stopped the insulin drip his anion gap is normal    His white cell count is trending down      Review of Systems   Otherwise complete ROS reviewed and negative except as mentioned in the HPI.    Past Medical History:   Past Medical History:   Diagnosis Date    Arthritis     Autonomic disease     CAD (coronary artery disease) 02/06/2017    Cervical radiculopathy 09/16/2021    Chronic constipation with acute exaccerbation 05/10/2021    Coronary artery disease     Degeneration of cervical intervertebral disc 08/11/2021    Diabetes mellitus     Diabetic foot ulcer 08/31/2020    Diabetic polyneuropathy associated with type 2 diabetes mellitus 01/18/2021    Elevated cholesterol     Gastroesophageal reflux disease 05/13/2019    Headache     HTN (hypertension), benign 05/03/2017    Hyperlipidemia     Hypertension     Mixed hyperlipidemia 02/07/2017    Multiple lung nodules 01/26/2020    5mm, 9 mm RLL identified 1/2020, not present 10/2019.    Myocardial infarction     Osteomyelitis 01/22/2020    Osteomyelitis of fifth toe of right foot 10/07/2019    Pancreatitis     Persistent insomnia 01/20/2020    Renal disorder     Sleep apnea 02/06/2017    Sleep apnea with use of continuous positive airway pressure (CPAP)     NON-COMPLIANT    Slow transit constipation 01/16/2019    Spinal stenosis in cervical region 09/16/2021    Vitamin D deficiency 03/02/2021     Past Surgical History:  Past Surgical History:   Procedure Laterality Date    ABDOMINAL SURGERY      AMPUTATION FOOT / TOE Left 10/2021    5th digit     ANTERIOR CERVICAL DISCECTOMY W/ FUSION N/A 08/05/2022    Procedure: CERVICAL DISCECTOMY ANTERIOR WITH FUSION C5-6 with possible plating of C5-7 with neuromonitoring and 1 c-arm;  Surgeon: Karel Soliz MD;  Location: [facility] OR;  Service: Neurosurgery;  Laterality: N/A;    APPENDECTOMY      BACK SURGERY      CARDIAC CATHETERIZATION Left 02/08/2021    Procedure: Left Heart Cath w poss intervention left anatomical snuff  antonia basilio;  Surgeon: Omkar Charles DO;  Location: [facility] CATH INVASIVE LOCATION;  Service: Cardiology;  Laterality: Left;    CARDIAC SURGERY      CATARACT EXTRACTION      CERVICAL SPINE SURGERY      COLONOSCOPY N/A 01/31/2017    Normal exam repeat in 5 years    COLONOSCOPY N/A 02/11/2019    Mild acute inflammation    COLONOSCOPY N/A 04/07/2024    2 areas at 10 and 20 cm with friability ulceration 2 clips placed at 20 cm and 4 clips at 10 cm poor prep normal mucosa,mild eroisions and ulcerations in visible vessels    COLONOSCOPY N/A 7/1/2024    Procedure: COLONOSCOPY WITH ANESTHESIA;  Surgeon: Arsalan Lorenzo DO;  Location: [facility] ENDOSCOPY;  Service: Gastroenterology;  Laterality: N/A;  pre op constipation/diarrhea  post poor prep  pcp Del Shetty    COLONOSCOPY N/A 7/2/2024    Procedure: COLONOSCOPY WITH ANESTHESIA;  Surgeon: Agapito Christopher MD;  Location: [facility] ENDOSCOPY;  Service: Gastroenterology;  Laterality: N/A;  pre rectal bleeding  post poor prep  pcp Del Shetty MD    COLONOSCOPY W/ POLYPECTOMY  03/04/2014    Hyperplastic polyp    CORONARY ARTERY BYPASS GRAFT  10/2015    ENDOSCOPY  04/13/2011    Gastritis with hemorrhage    ENDOSCOPY N/A 05/05/2017    Normal exam    ENDOSCOPY N/A 02/11/2019    Gastritis    ENDOSCOPY N/A 09/01/2020    Non-erosive gastritis with hemorrhage    ENDOSCOPY N/A 02/10/2021    Esophagitis    ENDOSCOPY N/A 04/11/2024    There were esophageal mucosal changes suspicious for short-segment Low's esophagus present in the distal esophagus. The maximum longitudinal extent of these mucosal changes was 2 cm in length. Mucosa was biopsied with a cold forceps for histologyDistal esophagus, biopsies: Mild chronic active esophagogastritis. No evidence of intestinal metaplasia, dysplasia. Antrum, bx, Mild chronic gastritis    FOOT SURGERY Left     INCISION AND DRAINAGE OF WOUND Left 09/2007    spider bite     Social History:  reports that he quit smoking about 33  "years ago. His smoking use included cigarettes. He has never used smokeless tobacco. He reports that he does not drink alcohol and does not use drugs.    Family History: family history includes Alzheimer's disease in his mother; Colon cancer in his father and sister; Colon polyps in his sister; Coronary artery disease in his sister and sister; Heart disease in his father.     Allergies:  Allergies   Allergen Reactions    Cefepime Hives and Anaphylaxis    Bactrim [Sulfamethoxazole-Trimethoprim] Other (See Comments)     \"RENAL FAILURE\"    Vancomycin Itching    Zolpidem Mental Status Change     \"makes him crazy\"    Metronidazole Rash       Medications:  Prior to Admission medications    Medication Sig Start Date End Date Taking? Authorizing Provider   ascorbic acid (VITAMIN C) 1000 MG tablet Take 1 tablet by mouth Daily. 8/25/23      bumetanide (BUMEX) 1 MG tablet Take 1 tablet by mouth 2 (Two) Times a Day for 30 days. 7/3/24 8/2/24  Josué De Oliveira MD   busPIRone (BUSPAR) 10 MG tablet Take 1 tablet by mouth 2 (Two) Times a Day.    Provider, MD Bossman   calcitriol (ROCALTROL) 0.5 MCG capsule Take 1 capsule by mouth Daily. 2/23/23      carvedilol (COREG) 3.125 MG tablet Take 1 tablet twice a day by oral route. 3/26/24      Diclofenac Sodium (VOLTAREN) 1 % gel gel Apply 2 g topically to the appropriate area as directed 4 (Four) Times a Day As Needed. 6/1/23      donepezil (ARICEPT) 10 MG tablet Take 1 tablet by mouth Daily. 5/3/24      DULoxetine (CYMBALTA) 60 MG capsule Take 1 capsule by mouth Daily. 3/11/24      famotidine (PEPCID) 20 MG tablet Take 1 tablet twice a day by oral route. 3/26/24      fluticasone (FLONASE) 50 MCG/ACT nasal spray Instill 1 spray in each nostril as directed by provider Daily. 4/18/24      Insulin Glargine (Lantus SoloStar) 100 UNIT/ML injection pen Inject 20 Units under the skin into the appropriate area as directed every night at bedtime. 5/9/24      Insulin Regular Human, Conc, " (HumuLIN R) 500 UNIT/ML solution pen-injector CONCENTRATED injection Inject 15 Units under the skin into the appropriate area as directed 3 (Three) Times a Day Before Meals.    Bossman Blount MD   Insulin Regular Human, Conc, (HumuLIN R) 500 UNIT/ML solution pen-injector CONCENTRATED injection Inject 40 Units under the skin into the appropriate area as directed 3 (Three) Times a Day Before Meals. Inject 40 units under the skin in the the appropriate area with regular meals AND 40 units with large meals.    Bossman Blount MD   Iron-Vitamin C (Vitron-C)  MG tablet Take 1 tablet twice a day by oral route. 4/18/24      levocetirizine (XYZAL) 5 MG tablet Take 1 tablet by mouth every day at bedtime. 5/9/24      melatonin 3 MG tablet Take 2 tablets by mouth At Night As Needed for Sleep. 4/11/24   Rene Maloney MD   nitroglycerin (NITROSTAT) 0.4 MG SL tablet Place 1 tablet under the tongue Every 5 (Five) Minutes As Needed for Chest Pain. Take no more than 3 doses in 15 minutes.    Bossman Blount MD   oxyCODONE (ROXICODONE) 10 MG tablet Take 1 tablet by mouth 2 (Two) Times a Day As Needed. Must last 30 days per md. 7/24/24      pantoprazole (PROTONIX) 40 MG EC tablet Take 1 tablet by mouth Every 12 (Twelve) Hours. 6/19/24      polyethylene glycol (MIRALAX) 17 GM/SCOOP powder Take 17 g by mouth Daily As Needed (constipation).    Bossman Blount MD   pregabalin (LYRICA) 100 MG capsule Take 2 capsules by mouth Every Night.    Bossman Blount MD   pregabalin (LYRICA) 100 MG capsule Take 1 capsule by mouth Every Morning, THEN 2 capsules Every Evening. 7/24/24 9/21/24     rosuvastatin (CRESTOR) 10 MG tablet Take 1 tablet by mouth Every Night. 5/9/24      sennosides-docusate (PERICOLACE) 8.6-50 MG per tablet Take 1 tablet by mouth Every Night. Obtain OTC 8/23/23   Lexie Houston APRN   sodium hypochlorite (DAKIN'S 1/4 STRENGTH) 0.125 % solution topical solution 0.125% Apply 1 application  "topically to the appropriate area as directed 2 (Two) Times a Day. 5/13/24      sucralfate (CARAFATE) 1 g tablet Take 1 tablet by mouth 4 (Four) Times a Day before meals. 5/3/24      tamsulosin (FLOMAX) 0.4 MG capsule 24 hr capsule Take 1 capsule by mouth Daily. 8/7/23      spironolactone (ALDACTONE) 25 MG tablet Take 1 tablet by mouth Daily. 9/28/20 12/31/20  Del Shetty MD     I have utilized all available immediate resources to obtain, update, or review the patient's current medications (including all prescriptions, over-the-counter products, herbals, cannabis/cannabidiol products, and vitamin/mineral/dietary (nutritional) supplements).    Objective     Vital Signs: /75   Pulse 72   Temp 96.4 °F (35.8 °C) (Axillary)   Resp 16   Ht 182.9 cm (72\")   Wt 130 kg (286 lb 13.1 oz)   SpO2 97%   BMI 38.90 kg/m²   Physical Exam   General :patient is comfortable not in distress    Head exam: Atraumatic ,normocephalic    Neck exam: No rigidity    Cardiac exam : normal cardiac sounds, no murmurs, no added sounds, normal rate and rhythm    Chest exam : Normal breath sounds, no crepitation, no rhonchi, normal work of breathing    Abdominal exam: Soft abdomen, no organomegaly, no tenderness    Skin exam: No rashes, no petechiae, left foot ulcer and wound      Results Reviewed:    Lab Results (last 24 hours)       Procedure Component Value Units Date/Time    Iron Profile [[id number]] Collected: 08/01/24 0419    Specimen: Blood Updated: 08/01/24 1148    POC Glucose Once [[id number]]  (Abnormal) Collected: 08/01/24 1123    Specimen: Blood Updated: 08/01/24 1134     Glucose 242 mg/dL      Comment: : [id number] Yesika Tahirafern Bernabe ID: [id number]       Blood Culture ID, PCR - Blood, Arm, Left [[id number]]  (Abnormal) Collected: 07/31/24 1849    Specimen: Blood from Arm, Left Updated: 08/01/24 1117     BCID, PCR Staph spp, not aureus or lugdunensis. Identification by BCID2 PCR.     BOTTLE TYPE Aerobic Bottle    " Blood Culture - Blood, Arm, Left [[id number]]  (Abnormal) Collected: 07/31/24 1849    Specimen: Blood from Arm, Left Updated: 08/01/24 1006     Blood Culture Abnormal Stain     Gram Stain Aerobic Bottle Gram positive cocci in clusters    POC Glucose Once [[id number]]  (Abnormal) Collected: 08/01/24 0823    Specimen: Blood Updated: 08/01/24 0834     Glucose 319 mg/dL      Comment: : [id number] Yesika Bernabe ID: [id number]       POC Glucose Once [[id number]]  (Abnormal) Collected: 08/01/24 0556    Specimen: Blood Updated: 08/01/24 0607     Glucose 338 mg/dL      Comment: : [id number] Thee Mcmahon ID: [id number]       Basic Metabolic Panel [[id number]]  (Abnormal) Collected: 08/01/24 0419    Specimen: Blood Updated: 08/01/24 0515     Glucose 370 mg/dL      BUN 50 mg/dL      Creatinine 2.28 mg/dL      Sodium 138 mmol/L      Potassium 4.1 mmol/L      Chloride 103 mmol/L      CO2 21.0 mmol/L      Calcium 8.8 mg/dL      BUN/Creatinine Ratio 21.9     Anion Gap 14.0 mmol/L      eGFR 30.5 mL/min/1.73     Narrative:      GFR Normal >60  Chronic Kidney Disease <60  Kidney Failure <15      Magnesium [[id number]]  (Abnormal) Collected: 08/01/24 0419    Specimen: Blood Updated: 08/01/24 0515     Magnesium 2.5 mg/dL     Phosphorus [[id number]]  (Normal) Collected: 08/01/24 0419    Specimen: Blood Updated: 08/01/24 0512     Phosphorus 3.6 mg/dL     CBC & Differential [[id number]]  (Abnormal) Collected: 08/01/24 0419    Specimen: Blood Updated: 08/01/24 0452    Narrative:      The following orders were created for panel order CBC & Differential.  Procedure                               Abnormality         Status                     ---------                               -----------         ------                     CBC Auto Differential[[id number]]        Abnormal            Final result                 Please view results for these tests on the individual orders.    CBC Auto Differential [[id number]]  (Abnormal)  Collected: 08/01/24 0419    Specimen: Blood Updated: 08/01/24 0452     WBC 12.83 10*3/mm3      RBC 3.38 10*6/mm3      Hemoglobin 9.0 g/dL      Hematocrit 28.3 %      MCV 83.7 fL      MCH 26.6 pg      MCHC 31.8 g/dL      RDW 14.5 %      RDW-SD 44.3 fl      MPV 12.7 fL      Platelets 176 10*3/mm3      Neutrophil % 79.2 %      Lymphocyte % 10.1 %      Monocyte % 9.6 %      Eosinophil % 0.2 %      Basophil % 0.3 %      Immature Grans % 0.6 %      Neutrophils, Absolute 10.16 10*3/mm3      Lymphocytes, Absolute 1.29 10*3/mm3      Monocytes, Absolute 1.23 10*3/mm3      Eosinophils, Absolute 0.03 10*3/mm3      Basophils, Absolute 0.04 10*3/mm3      Immature Grans, Absolute 0.08 10*3/mm3      nRBC 0.0 /100 WBC     MRSA Screen, PCR (Inpatient) - Swab, Nares [[id number]]  (Normal) Collected: 08/01/24 0206    Specimen: Swab from Nares Updated: 08/01/24 0346     MRSA PCR No MRSA Detected    Narrative:      The negative predictive value of this diagnostic test is high and should only be used to consider de-escalating anti-MRSA therapy. A positive result may indicate colonization with MRSA and must be correlated clinically.    Basic Metabolic Panel [[id number]]  (Abnormal) Collected: 08/01/24 0210    Specimen: Blood Updated: 08/01/24 0253     Glucose 379 mg/dL      BUN 51 mg/dL      Creatinine 2.39 mg/dL      Sodium 136 mmol/L      Potassium 4.0 mmol/L      Chloride 103 mmol/L      CO2 19.0 mmol/L      Calcium 8.7 mg/dL      BUN/Creatinine Ratio 21.3     Anion Gap 14.0 mmol/L      eGFR 28.8 mL/min/1.73     Narrative:      GFR Normal >60  Chronic Kidney Disease <60  Kidney Failure <15      STAT Lactic Acid, Reflex [[id number]]  (Normal) Collected: 08/01/24 0210    Specimen: Blood Updated: 08/01/24 0250     Lactate 1.3 mmol/L     POC Glucose Once [[id number]]  (Abnormal) Collected: 08/01/24 0210    Specimen: Blood Updated: 08/01/24 0221     Glucose 360 mg/dL      Comment: : [id number] Thee SamiMeter ID: [id number]       Basic  Metabolic Panel [[id number]]  (Abnormal) Collected: 08/01/24 0021    Specimen: Blood Updated: 08/01/24 0053     Glucose 370 mg/dL      BUN 52 mg/dL      Creatinine 2.46 mg/dL      Sodium 138 mmol/L      Potassium 4.1 mmol/L      Comment: Specimen hemolyzed.  Result may be falsely elevated.        Chloride 103 mmol/L      CO2 21.0 mmol/L      Calcium 8.8 mg/dL      BUN/Creatinine Ratio 21.1     Anion Gap 14.0 mmol/L      eGFR 27.8 mL/min/1.73     Narrative:      GFR Normal >60  Chronic Kidney Disease <60  Kidney Failure <15      Phosphorus [[id number]]  (Normal) Collected: 08/01/24 0021    Specimen: Blood Updated: 08/01/24 0043     Phosphorus 3.5 mg/dL     Magnesium [[id number]]  (Normal) Collected: 08/01/24 0021    Specimen: Blood Updated: 08/01/24 0042     Magnesium 2.4 mg/dL     POC Glucose Once [[id number]]  (Abnormal) Collected: 08/01/24 0010    Specimen: Blood Updated: 08/01/24 0022     Glucose 353 mg/dL      Comment: : [id number] Thee SamiMeter ID: [id number]       POC Glucose Once [[id number]]  (Abnormal) Collected: 07/31/24 2323    Specimen: Blood Updated: 07/31/24 2334     Glucose 237 mg/dL      Comment: : [id number] Thee SamiMeter ID: [id number]       STAT Lactic Acid, Reflex [[id number]]  (Abnormal) Collected: 07/31/24 2240    Specimen: Blood Updated: 07/31/24 2300     Lactate 2.2 mmol/L     POC Glucose Once [[id number]]  (Abnormal) Collected: 07/31/24 2238    Specimen: Blood Updated: 07/31/24 2250     Glucose 369 mg/dL      Comment: : [id number] Thee SamiMeter ID: [id number]       POC Glucose Once [[id number]]  (Abnormal) Collected: 07/31/24 2124    Specimen: Blood Updated: 07/31/24 2136     Glucose 543 mg/dL      Comment: : [id number] Zafar AbigailMeter ID: [id number]       Basic Metabolic Panel [[id number]]  (Abnormal) Collected: 07/31/24 2030    Specimen: Blood Updated: 07/31/24 2111     Glucose 653 mg/dL      BUN 54 mg/dL      Creatinine 2.64 mg/dL      Sodium 133 mmol/L       Potassium 4.4 mmol/L      Chloride 98 mmol/L      CO2 20.0 mmol/L      Calcium 8.9 mg/dL      BUN/Creatinine Ratio 20.5     Anion Gap 15.0 mmol/L      eGFR 25.6 mL/min/1.73     Narrative:      GFR Normal >60  Chronic Kidney Disease <60  Kidney Failure <15      Phosphorus [[id number]]  (Normal) Collected: 07/31/24 1849    Specimen: Blood Updated: 07/31/24 2110     Phosphorus 2.7 mg/dL     Magnesium [[id number]]  (Normal) Collected: 07/31/24 1849    Specimen: Blood Updated: 07/31/24 2110     Magnesium 2.2 mg/dL     Osmolality, Serum [[id number]]  (Abnormal) Collected: 07/31/24 2030    Specimen: Blood Updated: 07/31/24 2103     Osmolality 329 mOsm/kg     High Sensitivity Troponin T 2Hr [[id number]]  (Abnormal) Collected: 07/31/24 2030    Specimen: Blood Updated: 07/31/24 2058     HS Troponin T 353 ng/L      Troponin T Delta 11 ng/L     Narrative:      High Sensitive Troponin T Reference Range:  <14.0 ng/L- Negative Female for AMI  <22.0 ng/L- Negative Male for AMI  >=14 - Abnormal Female indicating possible myocardial injury.  >=22 - Abnormal Male indicating possible myocardial injury.   Clinicians would have to utilize clinical acumen, EKG, Troponin, and serial changes to determine if it is an Acute Myocardial Infarction or myocardial injury due to an underlying chronic condition.         Magnesium [[id number]]  (Normal) Collected: 07/31/24 2030    Specimen: Blood Updated: 07/31/24 2056     Magnesium 2.3 mg/dL     Phosphorus [[id number]]  (Normal) Collected: 07/31/24 2030    Specimen: Blood Updated: 07/31/24 2054     Phosphorus 3.1 mg/dL     POC Glucose Once [[id number]]  (Abnormal) Collected: 07/31/24 2021    Specimen: Blood Updated: 07/31/24 2033     Glucose 575 mg/dL      Comment: : [id number] Ryan Gundersonxavier ID: [id number]       Blood Gas, Venous - [[id number]]  (Abnormal) Collected: 07/31/24 2021    Specimen: Venous Blood Updated: 07/31/24 2021     Site OTHER     pH, Venous 7.410 pH Units      pCO2, Venous 32.2 mm  Hg      Comment: 84 Value below reference range        pO2, Venous 55.6 mm Hg      Comment: 83 Value above reference range        HCO3, Venous 20.4 mmol/L      Comment: 84 Value below reference range        Base Excess, Venous -3.6 mmol/L      Comment: 84 Value below reference range        O2 Saturation, Venous 89.9 %      Comment: 83 Value above reference range        Temperature 37.0     Barometric Pressure for Blood Gas 751 mmHg      Modality Room Air     FIO2 21 %      Ventilator Mode NA     Collected by [id number]     Comment: Meter: [id number]     :  [email or web address]       Hemoglobin A1c [[id number]]  (Abnormal) Collected: 07/31/24 1849    Specimen: Blood Updated: 07/31/24 2004     Hemoglobin A1C 12.60 %     Narrative:      Hemoglobin A1C Ranges:    Increased Risk for Diabetes  5.7% to 6.4%  Diabetes                     >= 6.5%  Diabetic Goal                < 7.0%    Comprehensive Metabolic Panel [[id number]]  (Abnormal) Collected: 07/31/24 1849    Specimen: Blood Updated: 07/31/24 1944     Glucose 704 mg/dL      BUN 55 mg/dL      Creatinine 2.67 mg/dL      Sodium 132 mmol/L      Potassium 4.5 mmol/L      Chloride 97 mmol/L      CO2 19.0 mmol/L      Calcium 9.0 mg/dL      Total Protein 6.7 g/dL      Albumin 3.5 g/dL      ALT (SGPT) 8 U/L      AST (SGOT) 11 U/L      Alkaline Phosphatase 101 U/L      Total Bilirubin 0.6 mg/dL      Globulin 3.2 gm/dL      A/G Ratio 1.1 g/dL      BUN/Creatinine Ratio 20.6     Anion Gap 16.0 mmol/L      eGFR 25.2 mL/min/1.73     Narrative:      GFR Normal >60  Chronic Kidney Disease <60  Kidney Failure <15      C-reactive Protein [[id number]]  (Abnormal) Collected: 07/31/24 1849    Specimen: Blood Updated: 07/31/24 1932     C-Reactive Protein 17.04 mg/dL     High Sensitivity Troponin T [[id number]]  (Abnormal) Collected: 07/31/24 1849    Specimen: Blood Updated: 07/31/24 1929     HS Troponin T 342 ng/L     Narrative:      High Sensitive Troponin T Reference Range:  <14.0 ng/L-  Negative Female for AMI  <22.0 ng/L- Negative Male for AMI  >=14 - Abnormal Female indicating possible myocardial injury.  >=22 - Abnormal Male indicating possible myocardial injury.   Clinicians would have to utilize clinical acumen, EKG, Troponin, and serial changes to determine if it is an Acute Myocardial Infarction or myocardial injury due to an underlying chronic condition.         Lactic Acid, Plasma [[id number]]  (Abnormal) Collected: 07/31/24 1849    Specimen: Blood Updated: 07/31/24 1928     Lactate 2.4 mmol/L     Urinalysis With Culture If Indicated - Straight Cath [[id number]]  (Abnormal) Collected: 07/31/24 1851    Specimen: Urine from Straight Cath Updated: 07/31/24 1924     Color, UA Yellow     Appearance, UA Clear     pH, UA <=5.0     Specific Gravity, UA 1.028     Glucose, UA >=1000 mg/dL (3+)     Ketones, UA Trace     Bilirubin, UA Negative     Blood, UA Trace     Protein,  mg/dL (2+)     Leuk Esterase, UA Negative     Nitrite, UA Positive     Urobilinogen, UA 0.2 E.U./dL    Narrative:      In absence of clinical symptoms, the presence of pyuria, bacteria, and/or nitrites on the urinalysis result does not correlate with infection.    Urinalysis, Microscopic Only - Straight Cath [[id number]]  (Abnormal) Collected: 07/31/24 1851    Specimen: Urine from Straight Cath Updated: 07/31/24 1924     RBC, UA 3-5 /HPF      WBC, UA 6-10 /HPF      Bacteria, UA 2+ /HPF      Squamous Epithelial Cells, UA 0-2 /HPF      Hyaline Casts, UA None Seen /LPF      Methodology Manual Light Microscopy    Urine Culture - Urine, Straight Cath [[id number]] Collected: 07/31/24 1851    Specimen: Urine from Straight Cath Updated: 07/31/24 1924    Acetone [[id number]]  (Abnormal) Collected: 07/31/24 1849    Specimen: Blood Updated: 07/31/24 1910     Acetone Small    Sedimentation Rate [[id number]]  (Abnormal) Collected: 07/31/24 1849    Specimen: Blood Updated: 07/31/24 1910     Sed Rate 57 mm/hr     CBC & Differential [[id number]]   (Abnormal) Collected: 07/31/24 1849    Specimen: Blood Updated: 07/31/24 1906    Narrative:      The following orders were created for panel order CBC & Differential.  Procedure                               Abnormality         Status                     ---------                               -----------         ------                     CBC Auto Differential[[id number]]        Abnormal            Final result                 Please view results for these tests on the individual orders.    CBC Auto Differential [[id number]]  (Abnormal) Collected: 07/31/24 1849    Specimen: Blood Updated: 07/31/24 1906     WBC 15.48 10*3/mm3      RBC 3.55 10*6/mm3      Hemoglobin 9.8 g/dL      Hematocrit 29.6 %      MCV 83.4 fL      MCH 27.6 pg      MCHC 33.1 g/dL      RDW 14.5 %      RDW-SD 43.8 fl      MPV 13.1 fL      Platelets 201 10*3/mm3      Neutrophil % 88.7 %      Lymphocyte % 2.9 %      Monocyte % 7.1 %      Eosinophil % 0.1 %      Basophil % 0.3 %      Immature Grans % 0.9 %      Neutrophils, Absolute 13.73 10*3/mm3      Lymphocytes, Absolute 0.45 10*3/mm3      Monocytes, Absolute 1.10 10*3/mm3      Eosinophils, Absolute 0.01 10*3/mm3      Basophils, Absolute 0.05 10*3/mm3      Immature Grans, Absolute 0.14 10*3/mm3     Blood Culture - Blood, Hand, Left [[id number]] Collected: 07/31/24 1849    Specimen: Blood from Hand, Left Updated: 07/31/24 1902            CT Abdomen Pelvis With Contrast    Result Date: 8/1/2024  EXAMINATION: CT ABDOMEN PELVIS W CONTRAST-   7/31/2024 10:41 PM  HISTORY: abdominal distention with pain; M86.9-Osteomyelitis, unspecified; E11.10-Type 2 diabetes mellitus with ketoacidosis without coma; R79.89-Other specified abnormal findings of blood chemistry; I48.91-Unspecified atrial fibrillation; R41.0-Disorientation, unspecified  In order to have a CT radiation dose as low as reasonably achievable Automated Exposure Control was utilized for adjustment of the mA and/or KV according to patient size.  Total  DLP = 1841.72 mGy.cm  The CT scan of the abdomen and pelvis is performed after intravenous contrast enhancement.  The images are acquired in axial plane and subsequent reconstruction in coronal and sagittal planes.  Comparison is made with the previous study dated 6/27/2024.  The lung bases included in the study show a trace right and small left basal pleural effusion. There are mild atelectatic changes at bilateral bases left more than the right.  Limited visualized cardiomediastinal structures show atheromatous changes of the coronary arteries. There is moderate cardiomegaly.  The liver and spleen are normal.  The gallbladder is surgically absent.  Fatty infiltrated pancreas seen. No focal abnormality. No ductal dilatation. The adrenal glands are normal.  There is persistent bilateral significant perinephric fat infiltration and thickening of the pararenal fascia which is similar to the previous study. Bilateral nephrogram is normal and symmetrical. No calculi. No hydronephrosis. There is a well-defined sharply marginated low density exophytic mass from the lower pole of the right kidney measuring 4.4 cm in diameter. CT density suggest a cyst. Limited visualized ureters are normal and nondilated. The urinary bladder is well distended. No intrinsic abnormality.  Prostate is not significantly enlarged.  There are small fat-containing inguinal hernias, right larger than the left.  There is subcutaneous fat infiltration of the entire abdominal wall extending into the lower extremity. This may represent a fluid overload?.  The stomach is decompressed with moderate wall thickening. No focal abnormality Alamast. Duodenum is normal. Small bowel is nondistended and nondilated. Appendix is surgically absent. There is significant large volume stool throughout the colon. No finding to suggest obstruction.  Atheromatous changes of the abdominal aorta and iliac arteries. No aneurysmal dilatation.  Moderately prominent  nonspecific retroperitoneal para-aortic lymph nodes predominantly in the mid abdomen. A referenced left para iliac lymph node, image #57 in series 2 and image #40 and series 3, measures 1.6 cm in short axis. There is a large lymph node in the left lower anterior pelvis/external iliac group measuring 1.3 cm in short axis. There are moderately prominent inguinal lymph nodes. A left inguinal lymph node measures 2 cm in short axis.  Images reviewed in bone window show chronic degenerative changes of the lumbar spine. No acute bony abnormality.      Impression: 1. A significant perinephric fat stranding is similar to the previous study and is a nonspecific finding. Possibility for chronic inflammatory process/chronic pyelonephritis may not be excluded. Renal functions are normal and symmetrical. 2. Fatty infiltration of the pancreas. No mass. No ductal dilatation. 3. Nonspecific abdominal, pelvic and inguinal lymphadenopathy. The etiology and clinical significance is not certain. This appears moderately more progressive since the previous study. 4. Diffuse subcutaneous fat infiltration of the abdominal wall extending into the lower extremity may represent fluid overload?. 5. A significant large volume of stool in the colon without evidence of obstruction. This may represent constipation.  The above study was initially reviewed and reported by [employer]. I do not find any discrepancies.           This report was signed and finalized on 8/1/2024 7:50 AM by Dr. Carlos Cutler MD.      CT Head Without Contrast    Result Date: 8/1/2024  EXAMINATION: CT HEAD WO CONTRAST-   7/31/2024 10:41 PM  HISTORY: altered mental status; M86.9-Osteomyelitis, unspecified; E11.10-Type 2 diabetes mellitus with ketoacidosis without coma; R79.89-Other specified abnormal findings of blood chemistry; I48.91-Unspecified atrial fibrillation; R41.0-Disorientation, unspecified  In order to have a CT radiation dose as low as reasonably achievable  Automated Exposure Control was utilized for adjustment of the mA and/or KV according to patient size.  Total DLP = 783.72 mGy.cm  The CT scan of the head is performed without intravenous contrast enhancement.  The images are acquired in axial plane and subsequent reconstruction with coronal and sagittal planes.  Comparison is made with the previous study dated 5/3/2024.  There is no evidence of a mass. There is no midline shift.  There is no evidence of intracranial hemorrhage or hematoma.  Moderately dilated ventricles, basal cisterns and the cortical sulci are similar to the previous study representing chronic volume loss.  Areas of chronic white matter ischemia bilaterally are noted. The gray-white matter differentiation is maintained.  Posterior fossa structures are normal.  The images reviewed in bone window show no acute displaced skull fracture. A subtle nondisplaced fracture or lesion may be obscured due to motion artifacts. Large mucous retention cyst is seen in the right maxillary antrum. The remaining paranasal sinuses and mastoid air cells are clear.      Impression: 1. No acute intracranial abnormality. 2. Chronic ischemic and atrophic changes. 3. Chronic maxillary sinusitis.  The above study was initially reviewed and reported by [facility]. I do not find any discrepancies.             This report was signed and finalized on 8/1/2024 5:27 AM by Dr. Carlos Cutler MD.      XR Foot 3+ View Left    Result Date: 7/31/2024  EXAMINATION:  XR FOOT 3+ VW LEFT-  7/31/2024 6:22 PM  HISTORY: Heel wound.  COMPARISON: 3/26/2024.  TECHNIQUE: 3 views were obtained.  FINDINGS: There is a deep ulcer on the sole of the foot posteriorly. The ulcer appears to be packed with some type of radiopaque material. On the lateral image, there may be a small area of bony erosion involving the calcaneus just posterior to the plantar calcaneal spur. A small area of osteomyelitis is not ruled out. There has been prior amputation of  the fifth toe and distal fifth metatarsal. There may have been prior resection of the distal fourth metacarpal and a portion of the proximal phalanx of the fourth toe versus chronic erosive change. The appearance is stable. There is severe narrowing of the first MTP joint. There is narrowing of some of the interphalangeal joints. There is some spurring in the tarsal region and at the tarsal-metatarsal junction. There is soft tissue swelling of the foot diffusely. There is a small curvilinear foreign body in the soft tissues along the sole of the foot in the second toe region in the proximal phalanx area. There is another small linear foreign body in the soft tissues adjacent to the distal phalanx of the great toe.       Impression: 1. Deep ulcer on the sole of the foot posteriorly near the calcaneus. There is a questionable small area of bony erosion along the plantar surface of the calcaneus just proximal to the plantar calcaneal spur. Osteomyelitis cannot be ruled out. The soft tissue ulcer is packed with some type of radiopaque material. 2. Linear foreign bodies projected over the soft tissues of the second toe and first toe. Artifacts are also included in the differential. 3. Other chronic changes, as discussed.   This report was signed and finalized on 7/31/2024 7:47 PM by Dr. Raz Mendes MD.      XR Chest 1 View    Result Date: 7/31/2024  EXAMINATION:  XR CHEST 1 VW-  7/31/2024 6:22 PM  HISTORY: Altered mental status. Hypertension and diabetes.  COMPARISON: 6/28/2024.  TECHNIQUE: Single view AP image.  FINDINGS: There is hypoventilation with vascular crowding. There is mild bronchial wall thickening, stable. There is no dense infiltrate or effusion. Heart size is borderline. Prior heart bypass surgery. Prior cervical fusion. No definite acute bony abnormality.       Impression: 1. Hypoventilation with vascular crowding. 2. Mild bronchial wall thickening, stable.    This report was signed and finalized on  7/31/2024 7:41 PM by Dr. Raz Mendes MD.       Assessment / Plan   Assessment and Plan    Active Hospital Problems    Diagnosis     **DKA, type 2, not at goal      AFIB-RVR  Patient initially came with RVR he was on diltiazem drip which was stopped currently he is in A-fib but rate controlled and hemodynamically stable     DKA that has resolved currently the patient is on subcu insulin the IV insulin was stopped    Urinary Tract Infection  Possible UTI no evidence of obstruction patient is already on broad-spectrum antibiotics and including Zosyn and vancomycin     Diabetic foot ulcer  -chronic open wound, DC ulcer stage 2-3 to left heal with larger area soft pale tissue with surrounding edema/erythema and weeping at the outer aspect of the foot along the area of the 5th metatarsal.   -Hx of osteomyelitis and related MRSA bacteremia   -Zosyn/Vanco started in ED will continue   -podiatry consult  -wound care consult  ID is consulted     Altered mental status  Secondary to sepsis which is improving   Abdominal pain  -reported nausea/vomiting. Abdominal distention and tender on exam  -Hx of pancreatitis  -previous appendectomy  -Hx of GI bleeding, no active bleeding noted  -No CT abd/pel in ER  -CT abd/pel with contras no evidence of renal obstruction but the possibility of questionable chronic fatty stranding around the kidney which is chronic     CKD-IV  -bun/creatinine at baseline  -monitor lytes and urine output   -avoid neph toxic medications        VTE Prophylaxis:     Pharmacologic VTE prophylaxis orders are present.      I provided 55 minutes of total critical care time. Due to the high probability of clinically significant, life-threatening deterioration, the patient required my direct and personal management. The critical care time does not include time spent on separately billable procedures.  Code Status:    .     Electronically signed by Keren Jamison MD on 8/1/2024 at 11:51 CDT     Electronically  signed by Keren Jamison MD at 08/01/24 2809

## 2024-08-02 NOTE — PROGRESS NOTES
"Pharmacy Dosing Service  Pharmacokinetics  Vancomycin Follow-up Evaluation    Assessment:  Active Hospital Problems    Diagnosis  POA    **DKA, type 2, not at goal [E11.10]  Yes       Current Order: Vancomycin 750 mg IVPB every 24 hours  Indication: DFI  Current end date:8/6/24    Levels/Previous evaluations:   Started with load dose of 2500 mg IV on 7/31 2100  Afterwards initiated order of 750 mg IV q24h    Other antimicrobials and/or additional factors considered in dosing methods:   Serum creatinine decreased today. Empiric calculations show the current regimen at AUC below goal, considering MRSA history    AUC Model Data - New Regimen:  Regimen: 1000 mg IV every 24 hours.  Start time: next dose on 08/02/2024  Exposure target: AUC24 (range)400-600 mg/L.hr   AUC24,ss: 510 mg/L.hr  PAUC*: 69 %  Ctrough,ss: 14.7 mg/L  Pconc*: 34 %  Tox.: 10 %      Plan: increase to 1000 mg IV Q24h. Keep trough order already ordered tomorrow night.     Clinical pharmacist following daily     Subjective:  Erick Luong is a 69 y.o. male currently on Vancomycin, day 3 of treatment.      Objective:  Ht: 182.9 cm (72\"); Wt: 130 kg (286 lb 13.1 oz)  Estimated Creatinine Clearance: 49.8 mL/min (A) (by C-G formula based on SCr of 1.98 mg/dL (H)).   Creatinine   Date Value Ref Range Status   08/02/2024 1.98 (H) 0.76 - 1.27 mg/dL Final   08/01/2024 2.28 (H) 0.76 - 1.27 mg/dL Final   08/01/2024 2.39 (H) 0.76 - 1.27 mg/dL Final      Lab Results   Component Value Date    WBC 9.76 08/02/2024    WBC 12.83 (H) 08/01/2024    WBC 15.48 (H) 07/31/2024       Culture Results:  Microbiology Results (last 10 days)       Procedure Component Value - Date/Time    Eosinophil Smear - Urine, Urine, Clean Catch [[id number]]  (Normal) Collected: 08/01/24 1547    Lab Status: Final result Specimen: Urine, Clean Catch Updated: 08/02/24 0149     Eosinophil Smear 0 % EOS/100 Cells     MRSA Screen, PCR (Inpatient) - Swab, Nares [[id number]]  (Normal) Collected: " 08/01/24 0206    Lab Status: Final result Specimen: Swab from Nares Updated: 08/01/24 0346     MRSA PCR No MRSA Detected    Narrative:      The negative predictive value of this diagnostic test is high and should only be used to consider de-escalating anti-MRSA therapy. A positive result may indicate colonization with MRSA and must be correlated clinically.    Urine Culture - Urine, Straight Cath [[id number]]  (Abnormal) Collected: 07/31/24 1851    Lab Status: Preliminary result Specimen: Urine from Straight Cath Updated: 08/02/24 0950     Urine Culture >100,000 CFU/mL Escherichia coli    Narrative:      Colonization of the urinary tract without infection is common. Treatment is discouraged unless the patient is symptomatic, pregnant, or undergoing an invasive urologic procedure.    Blood Culture - Blood, Hand, Left [[id number]]  (Normal) Collected: 07/31/24 1849    Lab Status: Preliminary result Specimen: Blood from Hand, Left Updated: 08/01/24 1915     Blood Culture No growth at 24 hours    Blood Culture - Blood, Arm, Left [[id number]]  (Abnormal) Collected: 07/31/24 1849    Lab Status: Final result Specimen: Blood from Arm, Left Updated: 08/02/24 0545     Blood Culture Staphylococcus, coagulase negative     Isolated from Aerobic Bottle     Gram Stain Aerobic Bottle Gram positive cocci in clusters    Narrative:      Probable contaminant requires clinical correlation, susceptibility not performed unless requested by physician.      Blood Culture ID, PCR - Blood, Arm, Left [[id number]]  (Abnormal) Collected: 07/31/24 1849    Lab Status: Final result Specimen: Blood from Arm, Left Updated: 08/01/24 1117     BCID, PCR Staph spp, not aureus or lugdunensis. Identification by BCID2 PCR.     BOTTLE TYPE Aerobic Bottle            Anthony Beltran, OlegarioD   08/02/24 10:38 CDT

## 2024-08-02 NOTE — CONSULTS
[facility] - PODIATRY    Today's Date: 08/02/24     Patient Name: Erick Luong  MRN: [id number]  CSN: [id number]  PCP: Del Shetty MD  Referring Provider: Abebe Garzon DO  Attending Provider: Payam Keyes DO  Length of Stay: 2    SUBJECTIVE   Chief Complaint: Left foot wounds    HPI: Erick Luong, a 69 y.o.male, who was admitted to the hospital on 7/31/2024 for altered mental status.  Patient has a past medical history of arthritis, CAD, type II DM, neuropathy, osteomyelitis, diabetic foot ulcers, hyperlipidemia, hypertension, spinal stenosis.  A full medical history as listed below.  Apparently patient was found by his son who reported patient was confused and wandering around outside in his garage.  Patient is unaccompanied and sitting up in his bedside chair at this current moment.  Patient is unable to provide a full history as he keeps repeating that he forgot what happened.  Patient is able to relay that he has been receiving ongoing care from Dr. Gomes a podiatrist in [place].  Notes Dr. Gomes has been treating his wound to his left foot with Dakin's and Hydrofera Blue.  Patient is currently denying pain.  Relates that he does have a significant amount of numbness and tingling to his bilateral feet.  Patient states his wife would normally be here at bedside with him but she is also currently admitted to the hospital for pneumonia.    Past Medical History:   Diagnosis Date    Arthritis     Autonomic disease     CAD (coronary artery disease) 02/06/2017    Cervical radiculopathy 09/16/2021    Chronic constipation with acute exaccerbation 05/10/2021    Coronary artery disease     Degeneration of cervical intervertebral disc 08/11/2021    Diabetes mellitus     Diabetic foot ulcer 08/31/2020    Diabetic polyneuropathy associated with type 2 diabetes mellitus 01/18/2021    Elevated cholesterol     Gastroesophageal reflux disease 05/13/2019    Headache     HTN  (hypertension), benign 05/03/2017    Hyperlipidemia     Hypertension     Mixed hyperlipidemia 02/07/2017    Multiple lung nodules 01/26/2020    5mm, 9 mm RLL identified 1/2020, not present 10/2019.    Myocardial infarction     Osteomyelitis 01/22/2020    Osteomyelitis of fifth toe of right foot 10/07/2019    Pancreatitis     Persistent insomnia 01/20/2020    Renal disorder     Sleep apnea 02/06/2017    Sleep apnea with use of continuous positive airway pressure (CPAP)     NON-COMPLIANT    Slow transit constipation 01/16/2019    Spinal stenosis in cervical region 09/16/2021    Vitamin D deficiency 03/02/2021     Past Surgical History:   Procedure Laterality Date    ABDOMINAL SURGERY      AMPUTATION FOOT / TOE Left 10/2021    5th digit     ANTERIOR CERVICAL DISCECTOMY W/ FUSION N/A 08/05/2022    Procedure: CERVICAL DISCECTOMY ANTERIOR WITH FUSION C5-6 with possible plating of C5-7 with neuromonitoring and 1 c-arm;  Surgeon: Karel Soliz MD;  Location: [facility] OR;  Service: Neurosurgery;  Laterality: N/A;    APPENDECTOMY      BACK SURGERY      CARDIAC CATHETERIZATION Left 02/08/2021    Procedure: Left Heart Cath w poss intervention left anatomical snuff box acess;  Surgeon: Omkar Charles DO;  Location: [facility] CATH INVASIVE LOCATION;  Service: Cardiology;  Laterality: Left;    CARDIAC SURGERY      CATARACT EXTRACTION      CERVICAL SPINE SURGERY      COLONOSCOPY N/A 01/31/2017    Normal exam repeat in 5 years    COLONOSCOPY N/A 02/11/2019    Mild acute inflammation    COLONOSCOPY N/A 04/07/2024    2 areas at 10 and 20 cm with friability ulceration 2 clips placed at 20 cm and 4 clips at 10 cm poor prep normal mucosa,mild eroisions and ulcerations in visible vessels    COLONOSCOPY N/A 7/1/2024    Procedure: COLONOSCOPY WITH ANESTHESIA;  Surgeon: Arsalan Lorenzo DO;  Location: [facility] ENDOSCOPY;  Service: Gastroenterology;  Laterality: N/A;  pre op constipation/diarrhea  post poor prep  pcp Del Shetty  "   COLONOSCOPY N/A 2024    Procedure: COLONOSCOPY WITH ANESTHESIA;  Surgeon: Agapito Christophre MD;  Location: [facility] ENDOSCOPY;  Service: Gastroenterology;  Laterality: N/A;  pre rectal bleeding  post poor prep  pcp Del Shetty MD    COLONOSCOPY W/ POLYPECTOMY  2014    Hyperplastic polyp    CORONARY ARTERY BYPASS GRAFT  10/2015    ENDOSCOPY  2011    Gastritis with hemorrhage    ENDOSCOPY N/A 2017    Normal exam    ENDOSCOPY N/A 2019    Gastritis    ENDOSCOPY N/A 2020    Non-erosive gastritis with hemorrhage    ENDOSCOPY N/A 02/10/2021    Esophagitis    ENDOSCOPY N/A 2024    There were esophageal mucosal changes suspicious for short-segment Low's esophagus present in the distal esophagus. The maximum longitudinal extent of these mucosal changes was 2 cm in length. Mucosa was biopsied with a cold forceps for histologyDistal esophagus, biopsies: Mild chronic active esophagogastritis. No evidence of intestinal metaplasia, dysplasia. Antrum, bx, Mild chronic gastritis    FOOT SURGERY Left     INCISION AND DRAINAGE OF WOUND Left 2007    spider bite     Family History   Problem Relation Age of Onset    Colon cancer Father     Heart disease Father     Colon cancer Sister     Colon polyps Sister     Alzheimer's disease Mother     Coronary artery disease Sister     Coronary artery disease Sister      Social History     Socioeconomic History    Marital status:    Tobacco Use    Smoking status: Former     Current packs/day: 0.00     Types: Cigarettes     Quit date:      Years since quittin.6    Smokeless tobacco: Never    Tobacco comments:     smoked in highschool   Vaping Use    Vaping status: Never Used   Substance and Sexual Activity    Alcohol use: No    Drug use: No    Sexual activity: Defer     Allergies   Allergen Reactions    Cefepime Hives and Anaphylaxis    Bactrim [Sulfamethoxazole-Trimethoprim] Other (See Comments)     \"RENAL FAILURE\"    Vancomycin " "Itching    Zolpidem Mental Status Change     \"makes him crazy\"    Metronidazole Rash     Current Facility-Administered Medications   Medication Dose Route Frequency Provider Last Rate Last Admin    ascorbic acid (VITAMIN C) tablet 1,000 mg  1,000 mg Oral Daily Reuben Garza APRN   1,000 mg at 08/02/24 1125    sennosides-docusate (PERICOLACE) 8.6-50 MG per tablet 2 tablet  2 tablet Oral BID Lamine Figueroa APRN   2 tablet at 08/02/24 0941    And    polyethylene glycol (MIRALAX) packet 17 g  17 g Oral Daily PRN Lamine Figueroa APRN        And    bisacodyl (DULCOLAX) EC tablet 5 mg  5 mg Oral Daily PRN Lamine Figueroa APRN        And    bisacodyl (DULCOLAX) suppository 10 mg  10 mg Rectal Daily PRN Lamine Figueroa APRN   10 mg at 08/02/24 0941    Calcium Replacement - Follow Nurse / BPA Driven Protocol   Does not apply PRN Abebe Garzon DO        Chlorhexidine Gluconate Cloth 2 % pads 1 Application  1 Application Topical Q24H Lamine Figueroa APRN   1 Application at 08/02/24 0431    dextrose (D50W) (25 g/50 mL) IV injection 10-50 mL  10-50 mL Intravenous Q15 Min PRN Abebe Garzon DO        dextrose (D50W) (25 g/50 mL) IV injection 25 g  25 g Intravenous Q15 Min PRN Lamine Figueroa APRN        dextrose (GLUTOSE) oral gel 15 g  15 g Oral Q15 Min PRN Lamine Figueroa APRN        donepezil (ARICEPT) tablet 10 mg  10 mg Oral Daily Reuben Garza APRN   10 mg at 08/02/24 1319    DULoxetine (CYMBALTA) DR capsule 60 mg  60 mg Oral Daily Reuben Garza APRN   60 mg at 08/02/24 1125    Enoxaparin Sodium (LOVENOX) syringe 135 mg  1 mg/kg Subcutaneous Q12H Abebe Garzon DO   135 mg at 08/02/24 0933    famotidine (PEPCID) tablet 20 mg  20 mg Oral Q12H Reuben Garza APRN   20 mg at 08/02/24 1109    ferric gluconate (FERRLECIT) 250 mg in sodium chloride 0.9 % 250 mL IVPB  250 mg Intravenous Daily Brian Lomas  mL/hr at 08/02/24 1302 250 mg at 08/02/24 1302    glucagon (GLUCAGEN) injection 1 " mg  1 mg Intramuscular Q15 Min PRN Lamine Figueroa APRN        insulin glargine (LANTUS, SEMGLEE) injection 10 Units  10 Units Subcutaneous Daily Keren Jamison MD   10 Units at 08/02/24 0932    insulin regular (humuLIN R,novoLIN R) injection 3-14 Units  3-14 Units Subcutaneous 4x Daily AC & at Bedtime Keren Jamison MD   3 Units at 08/02/24 1134    Magnesium Standard Dose Replacement - Follow Nurse / BPA Driven Protocol   Does not apply PRN Abebe Garzon DO        melatonin tablet 2.5 mg  2.5 mg Oral Nightly Reuben Garza APRN        mupirocin (BACTROBAN) 2 % nasal ointment 1 Application  1 Application Each Nare BID Lamine Figueroa APRN   1 Application at 08/02/24 0932    nitroglycerin (NITROSTAT) SL tablet 0.4 mg  0.4 mg Sublingual Q5 Min PRN Lamine Figueroa APRN        oxyCODONE (ROXICODONE) immediate release tablet 10 mg  10 mg Oral BID Lamine Figueroa APRN   10 mg at 08/02/24 0932    pantoprazole (PROTONIX) EC tablet 40 mg  40 mg Oral Q12H Reuben Garza APRN   40 mg at 08/02/24 1109    Pharmacy to Dose enoxaparin (LOVENOX)   Does not apply Continuous PRN Abebe Garzon DO        Phosphorus Replacement - Follow Nurse / BPA Driven Protocol   Does not apply PRN Abebe Garzon DO        polyethylene glycol (MIRALAX) packet 17 g  17 g Oral Daily Reuben Garza APRN   17 g at 08/02/24 1109    Potassium Replacement - Follow Nurse / BPA Driven Protocol   Does not apply PRN Abebe Garzon DO        pregabalin (LYRICA) capsule 100 mg  100 mg Oral Nightly Reuben Garza APRN        pregabalin (LYRICA) capsule 50 mg  50 mg Oral Daily Reuben Garza APRN   50 mg at 08/02/24 0940    rosuvastatin (CRESTOR) tablet 10 mg  10 mg Oral Nightly Reuben Garza APRN        sodium bicarbonate tablet 650 mg  650 mg Oral TID Brian Lomas MD        sodium chloride 0.9 % flush 10 mL  10 mL Intravenous Q12H Abebe Garzon DO   10 mL at 08/02/24 0933    sodium chloride 0.9 % flush 10 mL   10 mL Intravenous PRN Abebe Garzon, DO        sodium chloride 0.9 % flush 10 mL  10 mL Intravenous Q12H Lamine Figueroa APRN   10 mL at 08/02/24 0933    sodium chloride 0.9 % flush 10 mL  10 mL Intravenous PRN Lamine Figueroa APRN        sodium chloride 0.9 % infusion 40 mL  40 mL Intravenous PRN Abebe Garzon, DO        sodium chloride 0.9 % infusion 40 mL  40 mL Intravenous PRN Lamine Figueroa APRN        sodium chloride 0.9 % infusion  50 mL/hr Intravenous Continuous Brian Lomas MD 50 mL/hr at 08/02/24 1134 50 mL/hr at 08/02/24 1134    sodium hypochlorite (DAKIN'S 1/4 STRENGTH) 0.125 % topical solution 0.125% solution   Topical Q12H Aby High APRN   Given at 08/02/24 0941    vancomycin (VANCOCIN) 1,000 mg in sodium chloride 0.9 % 250 mL IVPB-VTB  1,000 mg Intravenous Q24H Julia Adrian MD         Review of Systems   Constitutional:  Negative for activity change and fever.   HENT:  Negative for congestion.    Respiratory:  Negative for chest tightness and shortness of breath.    Cardiovascular:  Negative for chest pain.   Gastrointestinal:  Negative for abdominal pain.   Musculoskeletal:  Negative for arthralgias.   Skin:  Positive for wound.   Neurological:  Positive for numbness.   Psychiatric/Behavioral:  Positive for confusion and decreased concentration. Negative for agitation.        OBJECTIVE     Vitals:    08/02/24 1456   BP: 141/75   Pulse: 87   Resp: 16   Temp: 97.5 °F (36.4 °C)   SpO2: 99%       PHYSICAL EXAM  GEN:   Pt presents in bedside chair. Accompanied by none.     Foot/Ankle Exam    GENERAL  Appearance:  chronically ill and appears ill  Orientation:  disoriented  Affect:  unfocused  Right shoe gear: sock  Left shoe gear: sock    VASCULAR     Right Foot Vascularity   Dorsalis pedis:  2+  Posterior tibial:  2+  Skin temperature:  warm  Edema grading:  Trace  CFT:  3  Pedal hair growth:  Absent     Left Foot Vascularity   Dorsalis pedis:  2+  Posterior tibial:   2+  Skin temperature:  warm  Edema grading:  Trace  CFT:  3  Pedal hair growth:  Absent     NEUROLOGIC     Right Foot Neurologic   Light touch sensation: diminished  Vibratory sensation: diminished  Hot/Cold sensation: diminished     Left Foot Neurologic   Light touch sensation: diminished  Vibratory sensation: diminished  Hot/Cold sensation:  diminished    MUSCULOSKELETAL     Right Foot Musculoskeletal   Tenderness:  none       Left Foot Musculoskeletal    Amputation   Toes amputated: fifth toe  Tenderness:  lateral foot tenderness    MUSCLE STRENGTH     Right Foot Muscle Strength   Foot dorsiflexion:  5  Foot plantar flexion:  5  Foot inversion:  5  Foot eversion:  5     Left Foot Muscle Strength   Foot dorsiflexion:  4+  Foot plantar flexion:  4+  Foot inversion:  4+  Foot eversion:  4+    RANGE OF MOTION     Right Foot Range of Motion   Foot and ankle ROM within normal limits       Left Foot Range of Motion   Foot and ankle ROM within normal limits      DERMATOLOGIC        Left Foot Dermatologic   Skin  Positive for drainage, erythema, maceration and wound.      Left foot additional comments: Multiple areas of ulceration with purulent drainage, erythema, and slight tenderness present. No malodor noticed.     See inserted images below.    Image:                  RADIOLOGY/NUCLEAR:  US Renal Bilateral    Result Date: 8/1/2024  Narrative: US RENAL BILATERAL- 8/1/2024 11:19 AM  HISTORY: Acute kidney injury; E11.628-Type 2 diabetes mellitus with other skin complications; L08.9-Local infection of the skin and subcutaneous tissue, unspecified; M86.9-Osteomyelitis, unspecified; E11.10-Type 2 diabetes mellitus with ketoacidosis without coma; R79.89-Other specified abnormal findings of blood chemistry; I48.91-Unspecified atrial fibrillation; R41.0-Disorientation, unspecified  COMPARISON: None available.   TECHNIQUE: Multiple longitudinal and transverse real-time sonographic images of the kidneys and urinary bladder are  obtained. Report and images stored per institutional and state regulations.    FINDINGS:  Visualized proximal abdominal aorta and IVC are unremarkable.   RIGHT KIDNEY: Right kidney is 10.7 cm in length. Renal cortex is unremarkable. There is no hydronephrosis. There is an exophytic anechoic simple right renal cyst measuring 4.9 cm..  LEFT KIDNEY: Left kidney is 11.3 cm in length. Renal cortex is unremarkable. There is no left hydronephrosis.  PELVIS: Urinary bladder is unremarkable.       Impression:  1. Simple right renal cyst. 2. Otherwise unremarkable kidneys and no hydronephrosis.    This report was signed and finalized on 8/1/2024 1:02 PM by Eran Christina.      CT Abdomen Pelvis With Contrast    Result Date: 8/1/2024  Narrative: EXAMINATION: CT ABDOMEN PELVIS W CONTRAST-   7/31/2024 10:41 PM  HISTORY: abdominal distention with pain; M86.9-Osteomyelitis, unspecified; E11.10-Type 2 diabetes mellitus with ketoacidosis without coma; R79.89-Other specified abnormal findings of blood chemistry; I48.91-Unspecified atrial fibrillation; R41.0-Disorientation, unspecified  In order to have a CT radiation dose as low as reasonably achievable Automated Exposure Control was utilized for adjustment of the mA and/or KV according to patient size.  Total DLP = 1841.72 mGy.cm  The CT scan of the abdomen and pelvis is performed after intravenous contrast enhancement.  The images are acquired in axial plane and subsequent reconstruction in coronal and sagittal planes.  Comparison is made with the previous study dated 6/27/2024.  The lung bases included in the study show a trace right and small left basal pleural effusion. There are mild atelectatic changes at bilateral bases left more than the right.  Limited visualized cardiomediastinal structures show atheromatous changes of the coronary arteries. There is moderate cardiomegaly.  The liver and spleen are normal.  The gallbladder is surgically absent.  Fatty infiltrated  pancreas seen. No focal abnormality. No ductal dilatation. The adrenal glands are normal.  There is persistent bilateral significant perinephric fat infiltration and thickening of the pararenal fascia which is similar to the previous study. Bilateral nephrogram is normal and symmetrical. No calculi. No hydronephrosis. There is a well-defined sharply marginated low density exophytic mass from the lower pole of the right kidney measuring 4.4 cm in diameter. CT density suggest a cyst. Limited visualized ureters are normal and nondilated. The urinary bladder is well distended. No intrinsic abnormality.  Prostate is not significantly enlarged.  There are small fat-containing inguinal hernias, right larger than the left.  There is subcutaneous fat infiltration of the entire abdominal wall extending into the lower extremity. This may represent a fluid overload?.  The stomach is decompressed with moderate wall thickening. No focal abnormality Alamast. Duodenum is normal. Small bowel is nondistended and nondilated. Appendix is surgically absent. There is significant large volume stool throughout the colon. No finding to suggest obstruction.  Atheromatous changes of the abdominal aorta and iliac arteries. No aneurysmal dilatation.  Moderately prominent nonspecific retroperitoneal para-aortic lymph nodes predominantly in the mid abdomen. A referenced left para iliac lymph node, image #57 in series 2 and image #40 and series 3, measures 1.6 cm in short axis. There is a large lymph node in the left lower anterior pelvis/external iliac group measuring 1.3 cm in short axis. There are moderately prominent inguinal lymph nodes. A left inguinal lymph node measures 2 cm in short axis.  Images reviewed in bone window show chronic degenerative changes of the lumbar spine. No acute bony abnormality.      Impression: 1. A significant perinephric fat stranding is similar to the previous study and is a nonspecific finding. Possibility for  chronic inflammatory process/chronic pyelonephritis may not be excluded. Renal functions are normal and symmetrical. 2. Fatty infiltration of the pancreas. No mass. No ductal dilatation. 3. Nonspecific abdominal, pelvic and inguinal lymphadenopathy. The etiology and clinical significance is not certain. This appears moderately more progressive since the previous study. 4. Diffuse subcutaneous fat infiltration of the abdominal wall extending into the lower extremity may represent fluid overload?. 5. A significant large volume of stool in the colon without evidence of obstruction. This may represent constipation.  The above study was initially reviewed and reported by [employer]. I do not find any discrepancies.           This report was signed and finalized on 8/1/2024 7:50 AM by Dr. Carlos Cutler MD.      CT Head Without Contrast    Result Date: 8/1/2024  Narrative: EXAMINATION: CT HEAD WO CONTRAST-   7/31/2024 10:41 PM  HISTORY: altered mental status; M86.9-Osteomyelitis, unspecified; E11.10-Type 2 diabetes mellitus with ketoacidosis without coma; R79.89-Other specified abnormal findings of blood chemistry; I48.91-Unspecified atrial fibrillation; R41.0-Disorientation, unspecified  In order to have a CT radiation dose as low as reasonably achievable Automated Exposure Control was utilized for adjustment of the mA and/or KV according to patient size.  Total DLP = 783.72 mGy.cm  The CT scan of the head is performed without intravenous contrast enhancement.  The images are acquired in axial plane and subsequent reconstruction with coronal and sagittal planes.  Comparison is made with the previous study dated 5/3/2024.  There is no evidence of a mass. There is no midline shift.  There is no evidence of intracranial hemorrhage or hematoma.  Moderately dilated ventricles, basal cisterns and the cortical sulci are similar to the previous study representing chronic volume loss.  Areas of chronic white matter ischemia  bilaterally are noted. The gray-white matter differentiation is maintained.  Posterior fossa structures are normal.  The images reviewed in bone window show no acute displaced skull fracture. A subtle nondisplaced fracture or lesion may be obscured due to motion artifacts. Large mucous retention cyst is seen in the right maxillary antrum. The remaining paranasal sinuses and mastoid air cells are clear.      Impression: 1. No acute intracranial abnormality. 2. Chronic ischemic and atrophic changes. 3. Chronic maxillary sinusitis.  The above study was initially reviewed and reported by [employer]. I do not find any discrepancies.             This report was signed and finalized on 8/1/2024 5:27 AM by Dr. Carlos Cutler MD.      XR Foot 3+ View Left    Result Date: 7/31/2024  Narrative: EXAMINATION:  XR FOOT 3+ VW LEFT-  7/31/2024 6:22 PM  HISTORY: Heel wound.  COMPARISON: 3/26/2024.  TECHNIQUE: 3 views were obtained.  FINDINGS: There is a deep ulcer on the sole of the foot posteriorly. The ulcer appears to be packed with some type of radiopaque material. On the lateral image, there may be a small area of bony erosion involving the calcaneus just posterior to the plantar calcaneal spur. A small area of osteomyelitis is not ruled out. There has been prior amputation of the fifth toe and distal fifth metatarsal. There may have been prior resection of the distal fourth metacarpal and a portion of the proximal phalanx of the fourth toe versus chronic erosive change. The appearance is stable. There is severe narrowing of the first MTP joint. There is narrowing of some of the interphalangeal joints. There is some spurring in the tarsal region and at the tarsal-metatarsal junction. There is soft tissue swelling of the foot diffusely. There is a small curvilinear foreign body in the soft tissues along the sole of the foot in the second toe region in the proximal phalanx area. There is another small linear foreign body in the  soft tissues adjacent to the distal phalanx of the great toe.       Impression: 1. Deep ulcer on the sole of the foot posteriorly near the calcaneus. There is a questionable small area of bony erosion along the plantar surface of the calcaneus just proximal to the plantar calcaneal spur. Osteomyelitis cannot be ruled out. The soft tissue ulcer is packed with some type of radiopaque material. 2. Linear foreign bodies projected over the soft tissues of the second toe and first toe. Artifacts are also included in the differential. 3. Other chronic changes, as discussed.   This report was signed and finalized on 7/31/2024 7:47 PM by Dr. Raz Mendes MD.      XR Chest 1 View    Result Date: 7/31/2024  Narrative: EXAMINATION:  XR CHEST 1 VW-  7/31/2024 6:22 PM  HISTORY: Altered mental status. Hypertension and diabetes.  COMPARISON: 6/28/2024.  TECHNIQUE: Single view AP image.  FINDINGS: There is hypoventilation with vascular crowding. There is mild bronchial wall thickening, stable. There is no dense infiltrate or effusion. Heart size is borderline. Prior heart bypass surgery. Prior cervical fusion. No definite acute bony abnormality.       Impression: 1. Hypoventilation with vascular crowding. 2. Mild bronchial wall thickening, stable.    This report was signed and finalized on 7/31/2024 7:41 PM by Dr. Raz Mendes MD.       LABORATORY/CULTURE RESULTS:  Results from last 7 days   Lab Units 08/02/24  0558 08/01/24 0419 07/31/24  1849   WBC 10*3/mm3 9.76 12.83* 15.48*   HEMOGLOBIN g/dL 10.0* 9.0* 9.8*   HEMATOCRIT % 31.8* 28.3* 29.6*   PLATELETS 10*3/mm3 219 176 201     Results from last 7 days   Lab Units 08/02/24  0558 08/01/24  0419 08/01/24  0210 07/31/24 2030 07/31/24  1849   SODIUM mmol/L 141 138 136   < > 132*   POTASSIUM mmol/L 3.8 4.1 4.0   < > 4.5   CHLORIDE mmol/L 108* 103 103   < > 97*   CO2 mmol/L 19.0* 21.0* 19.0*   < > 19.0*   BUN mg/dL 37* 50* 51*   < > 55*   CREATININE mg/dL 1.98* 2.28* 2.39*   < >  2.67*   CALCIUM mg/dL 8.5* 8.8 8.7   < > 9.0   BILIRUBIN mg/dL  --   --   --   --  0.6   ALK PHOS U/L  --   --   --   --  101   ALT (SGPT) U/L  --   --   --   --  8   AST (SGOT) U/L  --   --   --   --  11   GLUCOSE mg/dL 157* 370* 379*   < > 704*    < > = values in this interval not displayed.         Microbiology Results (last 10 days)       Procedure Component Value - Date/Time    Wound Culture - Drainage, Foot, Left [[id number]] Collected: 08/02/24 1338    Lab Status: Preliminary result Specimen: Drainage from Foot, Left Updated: 08/02/24 1531     Gram Stain Rare (1+) WBCs seen      No organisms seen    Wound Culture - Wound, Foot, Left [[id number]] Collected: 08/02/24 1032    Lab Status: Preliminary result Specimen: Wound from Foot, Left Updated: 08/02/24 1414     Gram Stain Few (2+) WBCs seen      Rare (1+) Gram positive cocci    Eosinophil Smear - Urine, Urine, Clean Catch [[id number]]  (Normal) Collected: 08/01/24 1547    Lab Status: Final result Specimen: Urine, Clean Catch Updated: 08/02/24 0149     Eosinophil Smear 0 % EOS/100 Cells     MRSA Screen, PCR (Inpatient) - Swab, Nares [[id number]]  (Normal) Collected: 08/01/24 0206    Lab Status: Final result Specimen: Swab from Nares Updated: 08/01/24 0346     MRSA PCR No MRSA Detected    Narrative:      The negative predictive value of this diagnostic test is high and should only be used to consider de-escalating anti-MRSA therapy. A positive result may indicate colonization with MRSA and must be correlated clinically.    Urine Culture - Urine, Straight Cath [[id number]]  (Abnormal) Collected: 07/31/24 1851    Lab Status: Preliminary result Specimen: Urine from Straight Cath Updated: 08/02/24 0950     Urine Culture >100,000 CFU/mL Escherichia coli    Narrative:      Colonization of the urinary tract without infection is common. Treatment is discouraged unless the patient is symptomatic, pregnant, or undergoing an invasive urologic procedure.    Blood Culture -  Blood, Hand, Left [[id number]]  (Normal) Collected: 07/31/24 1849    Lab Status: Preliminary result Specimen: Blood from Hand, Left Updated: 08/01/24 1915     Blood Culture No growth at 24 hours    Blood Culture - Blood, Arm, Left [[id number]]  (Abnormal) Collected: 07/31/24 1849    Lab Status: Final result Specimen: Blood from Arm, Left Updated: 08/02/24 0545     Blood Culture Staphylococcus, coagulase negative     Isolated from Aerobic Bottle     Gram Stain Aerobic Bottle Gram positive cocci in clusters    Narrative:      Probable contaminant requires clinical correlation, susceptibility not performed unless requested by physician.      Blood Culture ID, PCR - Blood, Arm, Left [[id number]]  (Abnormal) Collected: 07/31/24 1849    Lab Status: Final result Specimen: Blood from Arm, Left Updated: 08/01/24 1117     BCID, PCR Staph spp, not aureus or lugdunensis. Identification by BCID2 PCR.     BOTTLE TYPE Aerobic Bottle            PATHOLOGY RESULTS:       ASSESSMENT/PLAN   Diabetic foot ulcerations to left foot  Type 2 diabetes mellitus with hyperglycemia  Diabetic polyneuropathy    Review of labs, imaging, and other provider documentation  Comprehensive lower extremity examination and evaluation was performed.    Linear foreign bodies projected over the soft tissues of the second  toe and first toe were noted upon xray imaging (artifacts were also included in the differential). No evidence of soft tissue damage, FB entry, or infection noted to either great or second toes today.     Deep wound culture obtained today during debridement of wounds to left foot.    Orders for wound care placed-  Betadine wet-to-dry dressing to be performed twice daily per nursing.    From Podiatry standpoint, no surgical interventions are planned at this time.    Overall recommendation would be to send patient to a skilled nursing facility for ongoing wound care upon discharge.    Thank you kindly for the consult, will continue to follow  patient while in house.      This document has been electronically signed by SUSAN Luna on August 2, 2024 16:07 CDT

## 2024-08-02 NOTE — PROGRESS NOTES
"INFECTIOUS DISEASES PROGRESS NOTE    Patient:  Erick Luong  YOB: 1955  MRN: [id number]   Admit date: 7/31/2024   Admitting Physician: Keren Jamison MD  Primary Care Physician: Del Shetty MD    Chief Complaint: left         Interval History: The patient is up in chair.  Offers no new complaints but does note that his foot is tender.  I told him his wife informed yesterday that he had had a cast on his foot and asked when it was removed.  He said he thought about it 2 weeks ago per Dr. Gomes and shree.    Patient has orders to move to the floor.          Allergies:   Allergies   Allergen Reactions    Cefepime Hives and Anaphylaxis    Bactrim [Sulfamethoxazole-Trimethoprim] Other (See Comments)     \"RENAL FAILURE\"    Vancomycin Itching    Zolpidem Mental Status Change     \"makes him crazy\"    Metronidazole Rash       Current Scheduled Medications:   Chlorhexidine Gluconate Cloth, 1 Application, Topical, Q24H  enoxaparin, 1 mg/kg, Subcutaneous, Q12H  insulin glargine, 10 Units, Subcutaneous, Daily  insulin regular, 3-14 Units, Subcutaneous, 4x Daily AC & at Bedtime  mupirocin, 1 Application, Each Nare, BID  oxyCODONE, 10 mg, Oral, BID  piperacillin-tazobactam, 3.375 g, Intravenous, Q8H  pregabalin, 100 mg, Oral, Nightly  pregabalin, 50 mg, Oral, Daily  senna-docusate sodium, 2 tablet, Oral, BID  sodium chloride, 10 mL, Intravenous, Q12H  sodium chloride, 10 mL, Intravenous, Q12H  sodium hypochlorite, , Topical, Q12H  vancomycin, 750 mg, Intravenous, Q24H      Current PRN Medications:    senna-docusate sodium **AND** polyethylene glycol **AND** bisacodyl **AND** bisacodyl    Calcium Replacement - Follow Nurse / BPA Driven Protocol    dextrose    dextrose    dextrose    glucagon (human recombinant)    Magnesium Standard Dose Replacement - Follow Nurse / BPA Driven Protocol    nitroglycerin    Pharmacy to Dose enoxaparin (LOVENOX)    Pharmacy to Dose Zosyn    Phosphorus Replacement - " "Follow Nurse / BPA Driven Protocol    Potassium Replacement - Follow Nurse / BPA Driven Protocol    sodium chloride    sodium chloride    sodium chloride    sodium chloride    Pharmacy to Dose enoxaparin (LOVENOX),   Pharmacy to Dose Zosyn,   sodium chloride, 75 mL/hr, Last Rate: 75 mL/hr (24 0154)           Objective     Vital Signs:  Temp (24hrs), Av.8 °F (36 °C), Min:96.1 °F (35.6 °C), Max:97.4 °F (36.3 °C)      /71   Pulse 88   Temp 97.4 °F (36.3 °C) (Axillary)   Resp 15   Ht 182.9 cm (72\")   Wt 130 kg (286 lb 13.1 oz)   SpO2 97%   BMI 38.90 kg/m²         Physical Exam:    General: Patient is sitting in recliner in no acute distress  Respiratory: Effort even and unlabored, is not conversationally dyspneic  Left foot.  Continues to have edema, less erythema laterally.  Then purulent was expressed by applying some upward pressure starting just above the heel.  He did have some tenderness with that as well.  Packing in place left heel.        Results Review:    I reviewed the patient's new clinical results.    Lab Results:    CBC:   Lab Results   Lab 24  0558   WBC 15.48* 12.83* 9.76   HEMOGLOBIN 9.8* 9.0* 10.0*   HEMATOCRIT 29.6* 28.3* 31.8*   PLATELETS 201 176 219        AutoDiff:   Lab Results   Lab 24  0558   NEUTROPHIL % 88.7* 79.2* 71.6   LYMPHOCYTE % 2.9* 10.1* 12.7*   MONOCYTES % 7.1 9.6 8.5   EOSINOPHIL % 0.1* 0.2* 6.3*   BASOPHIL % 0.3 0.3 0.6   NEUTROS ABS 13.73* 10.16* 6.99   LYMPHS ABS 0.45* 1.29 1.24   MONOS ABS 1.10* 1.23* 0.83   EOS ABS 0.01 0.03 0.61*   BASOS ABS 0.05 0.04 0.06        Manual Diff:    Lab Results   Lab 24/02/24  0558   NEUTROS ABS 13.73* 10.16* 6.99           CMP:   Lab Results   Lab 24  184240 24  0558   SODIUM 132*   < > 136 138 141   POTASSIUM 4.5   < > 4.0 4.1 3.8   CHLORIDE 97*   < > 103 103 108* "   CO2 19.0*   < > 19.0* 21.0* 19.0*   BUN 55*   < > 51* 50* 37*   CREATININE 2.67*   < > 2.39* 2.28* 1.98*   CALCIUM 9.0   < > 8.7 8.8 8.5*   BILIRUBIN 0.6  --   --   --   --    ALK PHOS 101  --   --   --   --    ALT (SGPT) 8  --   --   --   --    AST (SGOT) 11  --   --   --   --    GLUCOSE 704*   < > 379* 370* 157*    < > = values in this interval not displayed.       Estimated Creatinine Clearance: 49.8 mL/min (A) (by C-G formula based on SCr of 1.98 mg/dL (H)).    Culture Results:    Microbiology Results (last 10 days)       Procedure Component Value - Date/Time    Eosinophil Smear - Urine, Urine, Clean Catch [[id number]]  (Normal) Collected: 08/01/24 1547    Lab Status: Final result Specimen: Urine, Clean Catch Updated: 08/02/24 0149     Eosinophil Smear 0 % EOS/100 Cells     MRSA Screen, PCR (Inpatient) - Swab, Nares [[id number]]  (Normal) Collected: 08/01/24 0206    Lab Status: Final result Specimen: Swab from Nares Updated: 08/01/24 0346     MRSA PCR No MRSA Detected    Narrative:      The negative predictive value of this diagnostic test is high and should only be used to consider de-escalating anti-MRSA therapy. A positive result may indicate colonization with MRSA and must be correlated clinically.    Blood Culture - Blood, Hand, Left [[id number]]  (Normal) Collected: 07/31/24 1849    Lab Status: Preliminary result Specimen: Blood from Hand, Left Updated: 08/01/24 1915     Blood Culture No growth at 24 hours    Blood Culture - Blood, Arm, Left [[id number]]  (Abnormal) Collected: 07/31/24 1849    Lab Status: Final result Specimen: Blood from Arm, Left Updated: 08/02/24 0545     Blood Culture Staphylococcus, coagulase negative     Isolated from Aerobic Bottle     Gram Stain Aerobic Bottle Gram positive cocci in clusters    Narrative:      Probable contaminant requires clinical correlation, susceptibility not performed unless requested by physician.      Blood Culture ID, PCR - Blood, Arm, Left [[id number]]   (Abnormal) Collected: 07/31/24 1849    Lab Status: Final result Specimen: Blood from Arm, Left Updated: 08/01/24 1117     BCID, PCR Staph spp, not aureus or lugdunensis. Identification by BCID2 PCR.     BOTTLE TYPE Aerobic Bottle                 Radiology:   Imaging Results (Last 72 Hours)       Procedure Component Value Units Date/Time    US Renal Bilateral [[id number]] Collected: 08/01/24 1300     Updated: 08/01/24 1305    Narrative:      US RENAL BILATERAL- 8/1/2024 11:19 AM     HISTORY: Acute kidney injury; E11.628-Type 2 diabetes mellitus with  other skin complications; L08.9-Local infection of the skin and  subcutaneous tissue, unspecified; M86.9-Osteomyelitis, unspecified;  E11.10-Type 2 diabetes mellitus with ketoacidosis without coma;  R79.89-Other specified abnormal findings of blood chemistry;  I48.91-Unspecified atrial fibrillation; R41.0-Disorientation,  unspecified     COMPARISON: None available.       TECHNIQUE: Multiple longitudinal and transverse real-time sonographic  images of the kidneys and urinary bladder are obtained. Report and  images stored per institutional and state regulations.           FINDINGS:     Visualized proximal abdominal aorta and IVC are unremarkable.        RIGHT KIDNEY: Right kidney is 10.7 cm in length. Renal cortex is  unremarkable. There is no hydronephrosis. There is an exophytic anechoic  simple right renal cyst measuring 4.9 cm..     LEFT KIDNEY: Left kidney is 11.3 cm in length. Renal cortex is  unremarkable. There is no left hydronephrosis.     PELVIS: Urinary bladder is unremarkable.          Impression:         1. Simple right renal cyst.  2. Otherwise unremarkable kidneys and no hydronephrosis.           This report was signed and finalized on 8/1/2024 1:02 PM by Eran Christina.       CT Abdomen Pelvis With Contrast [[id number]] Collected: 08/01/24 0543     Updated: 08/01/24 0754    Narrative:      EXAMINATION: CT ABDOMEN PELVIS W CONTRAST-      7/31/2024 10:41  PM     HISTORY: abdominal distention with pain; M86.9-Osteomyelitis,  unspecified; E11.10-Type 2 diabetes mellitus with ketoacidosis without  coma; R79.89-Other specified abnormal findings of blood chemistry;  I48.91-Unspecified atrial fibrillation; R41.0-Disorientation,  unspecified     In order to have a CT radiation dose as low as reasonably achievable  Automated Exposure Control was utilized for adjustment of the mA and/or  KV according to patient size.     Total DLP = 1841.72 mGy.cm     The CT scan of the abdomen and pelvis is performed after intravenous  contrast enhancement.     The images are acquired in axial plane and subsequent reconstruction in  coronal and sagittal planes.     Comparison is made with the previous study dated 6/27/2024.     The lung bases included in the study show a trace right and small left  basal pleural effusion. There are mild atelectatic changes at bilateral  bases left more than the right.     Limited visualized cardiomediastinal structures show atheromatous  changes of the coronary arteries. There is moderate cardiomegaly.     The liver and spleen are normal.     The gallbladder is surgically absent.     Fatty infiltrated pancreas seen. No focal abnormality. No ductal  dilatation. The adrenal glands are normal.     There is persistent bilateral significant perinephric fat infiltration  and thickening of the pararenal fascia which is similar to the previous  study. Bilateral nephrogram is normal and symmetrical. No calculi. No  hydronephrosis. There is a well-defined sharply marginated low density  exophytic mass from the lower pole of the right kidney measuring 4.4 cm  in diameter. CT density suggest a cyst. Limited visualized ureters are  normal and nondilated. The urinary bladder is well distended. No  intrinsic abnormality.     Prostate is not significantly enlarged.     There are small fat-containing inguinal hernias, right larger than the  left.     There is subcutaneous  fat infiltration of the entire abdominal wall  extending into the lower extremity. This may represent a fluid  overload?.     The stomach is decompressed with moderate wall thickening. No focal  abnormality [place]. Duodenum is normal. Small bowel is nondistended and  nondilated. Appendix is surgically absent. There is significant large  volume stool throughout the colon. No finding to suggest obstruction.     Atheromatous changes of the abdominal aorta and iliac arteries. No  aneurysmal dilatation.     Moderately prominent nonspecific retroperitoneal para-aortic lymph nodes  predominantly in the mid abdomen. A referenced left para iliac lymph  node, image #57 in series 2 and image #40 and series 3, measures 1.6 cm  in short axis. There is a large lymph node in the left lower anterior  pelvis/external iliac group measuring 1.3 cm in short axis. There are  moderately prominent inguinal lymph nodes. A left inguinal lymph node  measures 2 cm in short axis.     Images reviewed in bone window show chronic degenerative changes of the  lumbar spine. No acute bony abnormality.       Impression:      1. A significant perinephric fat stranding is similar to the previous  study and is a nonspecific finding. Possibility for chronic inflammatory  process/chronic pyelonephritis may not be excluded. Renal functions are  normal and symmetrical.  2. Fatty infiltration of the pancreas. No mass. No ductal dilatation.  3. Nonspecific abdominal, pelvic and inguinal lymphadenopathy. The  etiology and clinical significance is not certain. This appears  moderately more progressive since the previous study.  4. Diffuse subcutaneous fat infiltration of the abdominal wall extending  into the lower extremity may represent fluid overload?.  5. A significant large volume of stool in the colon without evidence of  obstruction. This may represent constipation.     The above study was initially reviewed and reported by [facility]. I do not  find any  discrepancies.                                This report was signed and finalized on 8/1/2024 7:50 AM by Dr. Carlos Cutler MD.       CT Head Without Contrast [[id number]] Collected: 08/01/24 0524     Updated: 08/01/24 0531    Narrative:      EXAMINATION: CT HEAD WO CONTRAST-      7/31/2024 10:41 PM     HISTORY: altered mental status; M86.9-Osteomyelitis, unspecified;  E11.10-Type 2 diabetes mellitus with ketoacidosis without coma;  R79.89-Other specified abnormal findings of blood chemistry;  I48.91-Unspecified atrial fibrillation; R41.0-Disorientation,  unspecified     In order to have a CT radiation dose as low as reasonably achievable  Automated Exposure Control was utilized for adjustment of the mA and/or  KV according to patient size.     Total DLP = 783.72 mGy.cm     The CT scan of the head is performed without intravenous contrast  enhancement.     The images are acquired in axial plane and subsequent reconstruction  with coronal and sagittal planes.     Comparison is made with the previous study dated 5/3/2024.     There is no evidence of a mass. There is no midline shift.     There is no evidence of intracranial hemorrhage or hematoma.     Moderately dilated ventricles, basal cisterns and the cortical sulci are  similar to the previous study representing chronic volume loss.     Areas of chronic white matter ischemia bilaterally are noted. The  gray-white matter differentiation is maintained.     Posterior fossa structures are normal.     The images reviewed in bone window show no acute displaced skull  fracture. A subtle nondisplaced fracture or lesion may be obscured due  to motion artifacts. Large mucous retention cyst is seen in the right  maxillary antrum. The remaining paranasal sinuses and mastoid air cells  are clear.       Impression:      1. No acute intracranial abnormality.  2. Chronic ischemic and atrophic changes.  3. Chronic maxillary sinusitis.     The above study was initially reviewed  and reported by [facility]. I do not  find any discrepancies.                                      This report was signed and finalized on 8/1/2024 5:27 AM by Dr. Carlos Cutler MD.       XR Foot 3+ View Left [[id number]] Collected: 07/31/24 1941     Updated: 07/31/24 1950    Narrative:      EXAMINATION:  XR FOOT 3+ VW LEFT-  7/31/2024 6:22 PM     HISTORY: Heel wound.     COMPARISON: 3/26/2024.     TECHNIQUE: 3 views were obtained.     FINDINGS: There is a deep ulcer on the sole of the foot posteriorly. The  ulcer appears to be packed with some type of radiopaque material. On the  lateral image, there may be a small area of bony erosion involving the  calcaneus just posterior to the plantar calcaneal spur. A small area of  osteomyelitis is not ruled out. There has been prior amputation of the  fifth toe and distal fifth metatarsal. There may have been prior  resection of the distal fourth metacarpal and a portion of the proximal  phalanx of the fourth toe versus chronic erosive change. The appearance  is stable. There is severe narrowing of the first MTP joint. There is  narrowing of some of the interphalangeal joints. There is some spurring  in the tarsal region and at the tarsal-metatarsal junction. There is  soft tissue swelling of the foot diffusely. There is a small curvilinear  foreign body in the soft tissues along the sole of the foot in the  second toe region in the proximal phalanx area. There is another small  linear foreign body in the soft tissues adjacent to the distal phalanx  of the great toe.          Impression:      1. Deep ulcer on the sole of the foot posteriorly near the calcaneus.  There is a questionable small area of bony erosion along the plantar  surface of the calcaneus just proximal to the plantar calcaneal spur.  Osteomyelitis cannot be ruled out. The soft tissue ulcer is packed with  some type of radiopaque material.  2. Linear foreign bodies projected over the soft tissues of the  second  toe and first toe. Artifacts are also included in the differential.  3. Other chronic changes, as discussed.        This report was signed and finalized on 7/31/2024 7:47 PM by Dr. Raz Mendes MD.       XR Chest 1 View [[id number]] Collected: 07/31/24 1940     Updated: 07/31/24 1944    Narrative:      EXAMINATION:  XR CHEST 1 VW-  7/31/2024 6:22 PM     HISTORY: Altered mental status. Hypertension and diabetes.     COMPARISON: 6/28/2024.     TECHNIQUE: Single view AP image.     FINDINGS: There is hypoventilation with vascular crowding. There is mild  bronchial wall thickening, stable. There is no dense infiltrate or  effusion. Heart size is borderline. Prior heart bypass surgery. Prior  cervical fusion. No definite acute bony abnormality.          Impression:      1. Hypoventilation with vascular crowding.  2. Mild bronchial wall thickening, stable.           This report was signed and finalized on 7/31/2024 7:41 PM by Dr. Raz Mendes MD.                   Active Hospital Problems    Diagnosis     **DKA, type 2, not at goal        IMPRESSION:  Diabetic foot infection and patient with history of osteomyelitis with MRSA status posttreatment with vancomycin and linezolid.  Patient has open wound to left heel but also lateral edema, erythema (improved somewhat today) and purulent drainage expressed today.  Uncontrolled diabetes mellitus with hemoglobin A1c greater than 12.  Glucose 700 on admission  Abdominal pain complaints on admission patient has previously had GI workup, colonoscopy attempt last month with inadequate prep.  CT of the abdomen unremarkable  Chronic kidney disease stage IIIb with acute kidney injury-creatinine continues to improve.  Leukocytosis-resolved      RECOMMENDATION:   Continue vancomycin given history of MRSA infection left foot including osteomyelitis  Discontinue Zosyn  Consult podiatry  Placed order for culture of left foot.  Discussed with ELVIS Hoffmann prepping area with  ChloraPrep, allowed to dry and expressing some drainage for culture.  Continue to monitor renal function.  Patient has tolerated linezolid in the past if renal function, vancomycin dosing becomes an issue.  Diabetes management per attending      Discussed with ELVIS Hoffmann MD  08/02/24  09:39 CDT

## 2024-08-02 NOTE — PROGRESS NOTES
[facility] Intensivist Services  Progress Note    Date of Admission: 7/31/2024  Primary Care Physician: Del Shetty MD    Subjective     Chief Complaint: DKA, type 2, not at goal     Altered Mental Status        Mr. Luong is a 69-year-old male who presented to [facility] ER via EMS for altered mental status.  It was reported to ER physician by EMS that the patient was was found by his son confused and wandering around in the garage.  Unfortunately family was not available for additional information.   HPI obtained from ER physician, Dr. Dee, who advises that patient presented soiled, confused and unable to provided events leading to his reason for EMS transport.      ER course workup significant for: CBC with elevated WBC of 15.4, low hemoglobin 9.8 and hematocrit 29.6, elevated ESR 57, elevated CRP of 17.  CMP with low sodium 133, elevated BUN of 54 and elevated creatinine 2.64, elevated glucose 704, urine ketones and small amount of acetone. Elevated lactate 2.4 with delta 2.2.  Elevated HS troponin T 342 delta 353.   Urinalysis: Trace blood, positive nitrite, negative leukocytes, +2 bacteria.   CXR: Low lung volumes and vascular crowding.  X-ray of left foot: Ulcer on the sole of the foot posteriorly near the calcaneus.  There is questionable small area of bony erosion along the plantar surface of the calcaneus just proximal to the plantar calcaneal spur.  Osteomyelitis cannot be ruled out.   EKG: A-fib with rapid ventricular response with rate of 128 bpm.      The ICU team was asked to evaluate the patient for AMS, AFIB-RVR, DKA, and open wound to left foot concerning for possible osteomyelitis.      8/1/2024  Patient is more awake this morning and less confused, patient is hemodynamically stable without pressors    Blood cultures has shown gram-positive cocci patient is on Vanco and Zosyn    ID is following him    He is not still in A-fib but his rate controlled patient  initially was on diltiazem drip that was stopped    We have also stopped the insulin drip his anion gap is normal    His white cell count is trending down    8/2  Patient alert, no distress.  Complaining of generalized pain which is his baseline.  In A-fib rate controlled.  BP stable.  Blood glucose controlled on current regimen.  Infectious disease following and WBC has normalized on vancomycin course.    Review of Systems   Otherwise complete ROS reviewed and negative except as mentioned in the HPI.    Past Medical History:   Past Medical History:   Diagnosis Date    Arthritis     Autonomic disease     CAD (coronary artery disease) 02/06/2017    Cervical radiculopathy 09/16/2021    Chronic constipation with acute exaccerbation 05/10/2021    Coronary artery disease     Degeneration of cervical intervertebral disc 08/11/2021    Diabetes mellitus     Diabetic foot ulcer 08/31/2020    Diabetic polyneuropathy associated with type 2 diabetes mellitus 01/18/2021    Elevated cholesterol     Gastroesophageal reflux disease 05/13/2019    Headache     HTN (hypertension), benign 05/03/2017    Hyperlipidemia     Hypertension     Mixed hyperlipidemia 02/07/2017    Multiple lung nodules 01/26/2020    5mm, 9 mm RLL identified 1/2020, not present 10/2019.    Myocardial infarction     Osteomyelitis 01/22/2020    Osteomyelitis of fifth toe of right foot 10/07/2019    Pancreatitis     Persistent insomnia 01/20/2020    Renal disorder     Sleep apnea 02/06/2017    Sleep apnea with use of continuous positive airway pressure (CPAP)     NON-COMPLIANT    Slow transit constipation 01/16/2019    Spinal stenosis in cervical region 09/16/2021    Vitamin D deficiency 03/02/2021     Past Surgical History:  Past Surgical History:   Procedure Laterality Date    ABDOMINAL SURGERY      AMPUTATION FOOT / TOE Left 10/2021    5th digit     ANTERIOR CERVICAL DISCECTOMY W/ FUSION N/A 08/05/2022    Procedure: CERVICAL DISCECTOMY ANTERIOR WITH FUSION C5-6  with possible plating of C5-7 with neuromonitoring and 1 c-arm;  Surgeon: Karel Soliz MD;  Location: [facility] OR;  Service: Neurosurgery;  Laterality: N/A;    APPENDECTOMY      BACK SURGERY      CARDIAC CATHETERIZATION Left 02/08/2021    Procedure: Left Heart Cath w poss intervention left anatomical snuff box acess;  Surgeon: Omkar Charles DO;  Location: [facility] CATH INVASIVE LOCATION;  Service: Cardiology;  Laterality: Left;    CARDIAC SURGERY      CATARACT EXTRACTION      CERVICAL SPINE SURGERY      COLONOSCOPY N/A 01/31/2017    Normal exam repeat in 5 years    COLONOSCOPY N/A 02/11/2019    Mild acute inflammation    COLONOSCOPY N/A 04/07/2024    2 areas at 10 and 20 cm with friability ulceration 2 clips placed at 20 cm and 4 clips at 10 cm poor prep normal mucosa,mild eroisions and ulcerations in visible vessels    COLONOSCOPY N/A 7/1/2024    Procedure: COLONOSCOPY WITH ANESTHESIA;  Surgeon: Arsalan Loernzo DO;  Location: [facility] ENDOSCOPY;  Service: Gastroenterology;  Laterality: N/A;  pre op constipation/diarrhea  post poor prep  pcp Del Shetty    COLONOSCOPY N/A 7/2/2024    Procedure: COLONOSCOPY WITH ANESTHESIA;  Surgeon: Agapito Christopher MD;  Location: [facility] ENDOSCOPY;  Service: Gastroenterology;  Laterality: N/A;  pre rectal bleeding  post poor prep  pcp Del Shetty MD    COLONOSCOPY W/ POLYPECTOMY  03/04/2014    Hyperplastic polyp    CORONARY ARTERY BYPASS GRAFT  10/2015    ENDOSCOPY  04/13/2011    Gastritis with hemorrhage    ENDOSCOPY N/A 05/05/2017    Normal exam    ENDOSCOPY N/A 02/11/2019    Gastritis    ENDOSCOPY N/A 09/01/2020    Non-erosive gastritis with hemorrhage    ENDOSCOPY N/A 02/10/2021    Esophagitis    ENDOSCOPY N/A 04/11/2024    There were esophageal mucosal changes suspicious for short-segment Low's esophagus present in the distal esophagus. The maximum longitudinal extent of these mucosal changes was 2 cm in length. Mucosa was biopsied with a cold  "forceps for histologyDistal esophagus, biopsies: Mild chronic active esophagogastritis. No evidence of intestinal metaplasia, dysplasia. Antrum, bx, Mild chronic gastritis    FOOT SURGERY Left     INCISION AND DRAINAGE OF WOUND Left 09/2007    spider bite     Social History:  reports that he quit smoking about 33 years ago. His smoking use included cigarettes. He has never used smokeless tobacco. He reports that he does not drink alcohol and does not use drugs.    Family History: family history includes Alzheimer's disease in his mother; Colon cancer in his father and sister; Colon polyps in his sister; Coronary artery disease in his sister and sister; Heart disease in his father.     Allergies:  Allergies   Allergen Reactions    Cefepime Hives and Anaphylaxis    Bactrim [Sulfamethoxazole-Trimethoprim] Other (See Comments)     \"RENAL FAILURE\"    Vancomycin Itching    Zolpidem Mental Status Change     \"makes him crazy\"    Metronidazole Rash       Medications:  Prior to Admission medications    Medication Sig Start Date End Date Taking? Authorizing Provider   ascorbic acid (VITAMIN C) 1000 MG tablet Take 1 tablet by mouth Daily. 8/25/23      bumetanide (BUMEX) 1 MG tablet Take 1 tablet by mouth 2 (Two) Times a Day for 30 days. 7/3/24 8/2/24  Josué De Oliveira MD   busPIRone (BUSPAR) 10 MG tablet Take 1 tablet by mouth 2 (Two) Times a Day.    Provider, MD Bossman   calcitriol (ROCALTROL) 0.5 MCG capsule Take 1 capsule by mouth Daily. 2/23/23      carvedilol (COREG) 3.125 MG tablet Take 1 tablet twice a day by oral route. 3/26/24      Diclofenac Sodium (VOLTAREN) 1 % gel gel Apply 2 g topically to the appropriate area as directed 4 (Four) Times a Day As Needed. 6/1/23      donepezil (ARICEPT) 10 MG tablet Take 1 tablet by mouth Daily. 5/3/24      DULoxetine (CYMBALTA) 60 MG capsule Take 1 capsule by mouth Daily. 3/11/24      famotidine (PEPCID) 20 MG tablet Take 1 tablet twice a day by oral route. 3/26/24    "   fluticasone (FLONASE) 50 MCG/ACT nasal spray Instill 1 spray in each nostril as directed by provider Daily. 4/18/24      Insulin Glargine (Lantus SoloStar) 100 UNIT/ML injection pen Inject 20 Units under the skin into the appropriate area as directed every night at bedtime. 5/9/24      Insulin Regular Human, Conc, (HumuLIN R) 500 UNIT/ML solution pen-injector CONCENTRATED injection Inject 15 Units under the skin into the appropriate area as directed 3 (Three) Times a Day Before Meals.    Bossman Blount MD   Insulin Regular Human, Conc, (HumuLIN R) 500 UNIT/ML solution pen-injector CONCENTRATED injection Inject 40 Units under the skin into the appropriate area as directed 3 (Three) Times a Day Before Meals. Inject 40 units under the skin in the the appropriate area with regular meals AND 40 units with large meals.    Bossman Blount MD   Iron-Vitamin C (Vitron-C)  MG tablet Take 1 tablet twice a day by oral route. 4/18/24      levocetirizine (XYZAL) 5 MG tablet Take 1 tablet by mouth every day at bedtime. 5/9/24      melatonin 3 MG tablet Take 2 tablets by mouth At Night As Needed for Sleep. 4/11/24   Rene Maloney MD   nitroglycerin (NITROSTAT) 0.4 MG SL tablet Place 1 tablet under the tongue Every 5 (Five) Minutes As Needed for Chest Pain. Take no more than 3 doses in 15 minutes.    Bossman Blount MD   oxyCODONE (ROXICODONE) 10 MG tablet Take 1 tablet by mouth 2 (Two) Times a Day As Needed. Must last 30 days per md. 7/24/24      pantoprazole (PROTONIX) 40 MG EC tablet Take 1 tablet by mouth Every 12 (Twelve) Hours. 6/19/24      polyethylene glycol (MIRALAX) 17 GM/SCOOP powder Take 17 g by mouth Daily As Needed (constipation).    Bossman Blount MD   pregabalin (LYRICA) 100 MG capsule Take 2 capsules by mouth Every Night.    Bossman Blount MD   pregabalin (LYRICA) 100 MG capsule Take 1 capsule by mouth Every Morning, THEN 2 capsules Every Evening. 7/24/24 9/21/24    "  rosuvastatin (CRESTOR) 10 MG tablet Take 1 tablet by mouth Every Night. 5/9/24      sennosides-docusate (PERICOLACE) 8.6-50 MG per tablet Take 1 tablet by mouth Every Night. Obtain OTC 8/23/23   Lexie Houston APRN   sodium hypochlorite (DAKIN'S 1/4 STRENGTH) 0.125 % solution topical solution 0.125% Apply 1 application topically to the appropriate area as directed 2 (Two) Times a Day. 5/13/24      sucralfate (CARAFATE) 1 g tablet Take 1 tablet by mouth 4 (Four) Times a Day before meals. 5/3/24      tamsulosin (FLOMAX) 0.4 MG capsule 24 hr capsule Take 1 capsule by mouth Daily. 8/7/23      spironolactone (ALDACTONE) 25 MG tablet Take 1 tablet by mouth Daily. 9/28/20 12/31/20  Del Shetty MD     I have utilized all available immediate resources to obtain, update, or review the patient's current medications (including all prescriptions, over-the-counter products, herbals, cannabis/cannabidiol products, and vitamin/mineral/dietary (nutritional) supplements).    Objective     Vital Signs: /68   Pulse 81   Temp 97.4 °F (36.3 °C) (Axillary)   Resp 15   Ht 182.9 cm (72\")   Wt 130 kg (286 lb 13.1 oz)   SpO2 98%   BMI 38.90 kg/m²   Physical Exam   General : Alert and oriented, no distress, complaining of generalized discomfort  HENT:normocephalic, PERRL  Cardiac: normal rate and irregular rhythm, trace edema, normal pulses  Pulm: Normal breath sounds, no rhonchi, normal work of breathing  Abdominal exam: Soft, nontender  Skin exam: Warm, ;left foot ulcer and wound  Neuro: No focal deficit    Results Reviewed:    Lab Results (last 24 hours)       Procedure Component Value Units Date/Time    Wound Culture - Wound, Foot, Left [[id number]] Collected: 08/02/24 1032    Specimen: Wound from Foot, Left Updated: 08/02/24 1038    Urine Culture - Urine, Straight Cath [[id number]]  (Abnormal) Collected: 07/31/24 1851    Specimen: Urine from Straight Cath Updated: 08/02/24 0950     Urine Culture >100,000 CFU/mL " Escherichia coli    Narrative:      Colonization of the urinary tract without infection is common. Treatment is discouraged unless the patient is symptomatic, pregnant, or undergoing an invasive urologic procedure.    POC Glucose Once [[id number]]  (Abnormal) Collected: 08/02/24 0705    Specimen: Blood Updated: 08/02/24 0716     Glucose 160 mg/dL      Comment: : [id number] Kaya Davila ID: [id number]       Basic Metabolic Panel [[id number]]  (Abnormal) Collected: 08/02/24 0558    Specimen: Blood Updated: 08/02/24 0654     Glucose 157 mg/dL      BUN 37 mg/dL      Creatinine 1.98 mg/dL      Sodium 141 mmol/L      Potassium 3.8 mmol/L      Chloride 108 mmol/L      CO2 19.0 mmol/L      Calcium 8.5 mg/dL      BUN/Creatinine Ratio 18.7     Anion Gap 14.0 mmol/L      eGFR 36.1 mL/min/1.73     Narrative:      GFR Normal >60  Chronic Kidney Disease <60  Kidney Failure <15      CBC & Differential [[id number]]  (Abnormal) Collected: 08/02/24 0558    Specimen: Blood Updated: 08/02/24 0634    Narrative:      The following orders were created for panel order CBC & Differential.  Procedure                               Abnormality         Status                     ---------                               -----------         ------                     CBC Auto Differential[[id number]]        Abnormal            Final result                 Please view results for these tests on the individual orders.    CBC Auto Differential [[id number]]  (Abnormal) Collected: 08/02/24 0558    Specimen: Blood Updated: 08/02/24 0634     WBC 9.76 10*3/mm3      RBC 3.74 10*6/mm3      Hemoglobin 10.0 g/dL      Hematocrit 31.8 %      MCV 85.0 fL      MCH 26.7 pg      MCHC 31.4 g/dL      RDW 14.6 %      RDW-SD 45.6 fl      MPV 12.7 fL      Platelets 219 10*3/mm3      Neutrophil % 71.6 %      Lymphocyte % 12.7 %      Monocyte % 8.5 %      Eosinophil % 6.3 %      Basophil % 0.6 %      Immature Grans % 0.3 %      Neutrophils, Absolute 6.99 10*3/mm3       Lymphocytes, Absolute 1.24 10*3/mm3      Monocytes, Absolute 0.83 10*3/mm3      Eosinophils, Absolute 0.61 10*3/mm3      Basophils, Absolute 0.06 10*3/mm3      Immature Grans, Absolute 0.03 10*3/mm3      nRBC 0.0 /100 WBC     Blood Culture - Blood, Arm, Left [[id number]]  (Abnormal) Collected: 07/31/24 1849    Specimen: Blood from Arm, Left Updated: 08/02/24 0545     Blood Culture Staphylococcus, coagulase negative     Isolated from Aerobic Bottle     Gram Stain Aerobic Bottle Gram positive cocci in clusters    Narrative:      Probable contaminant requires clinical correlation, susceptibility not performed unless requested by physician.      Eosinophil Smear - Urine, Urine, Clean Catch [[id number]]  (Normal) Collected: 08/01/24 1547    Specimen: Urine, Clean Catch Updated: 08/02/24 0149     Eosinophil Smear 0 % EOS/100 Cells     Creatinine Urine Random (kidney function) GFR component - Urine, Clean Catch [[id number]] Collected: 08/01/24 1547    Specimen: Urine, Clean Catch Updated: 08/02/24 0036     Creatinine, Urine 59.6 mg/dL     Narrative:      Reference intervals for random urine have not been established.  Clinical usage is dependent upon physician's interpretation in combination with other laboratory tests.       POC Glucose Once [[id number]]  (Abnormal) Collected: 08/01/24 2034    Specimen: Blood Updated: 08/01/24 2045     Glucose 140 mg/dL      Comment: : [id number] Sarah JuneYousifeter ID: [id number]       Blood Culture - Blood, Hand, Left [[id number]]  (Normal) Collected: 07/31/24 1849    Specimen: Blood from Hand, Left Updated: 08/01/24 1915     Blood Culture No growth at 24 hours    POC Glucose Once [[id number]]  (Abnormal) Collected: 08/01/24 1659    Specimen: Blood Updated: 08/01/24 1710     Glucose 188 mg/dL      Comment: : [id number] Jyoti Edmondson ID: [id number]       Sodium, Urine, Random - Urine, Clean Catch [[id number]] Collected: 08/01/24 1547    Specimen: Urine, Clean Catch Updated:  08/01/24 1639     Sodium, Urine 34 mmol/L     Narrative:      Reference intervals for random urine have not been established.  Clinical usage is dependent upon physician's interpretation in combination with other laboratory tests.       Protein, Urine, Random - Urine, Clean Catch [[id number]] Collected: 08/01/24 1547    Specimen: Urine, Clean Catch Updated: 08/01/24 1638     Total Protein, Urine 84.4 mg/dL     Narrative:      Reference intervals for random urine have not been established.  Clinical usage is dependent upon physician's interpretation in combination with other laboratory tests.       PTH, Intact [[id number]]  (Normal) Collected: 08/01/24 1217    Specimen: Blood Updated: 08/01/24 1341     PTH, Intact 28.3 pg/mL     Narrative:      Results may be falsely decreased if patient taking Biotin.      Iron Profile [[id number]]  (Abnormal) Collected: 08/01/24 0419    Specimen: Blood Updated: 08/01/24 1201     Iron 10 mcg/dL      Iron Saturation (TSAT) 4 %      Transferrin 185 mg/dL      TIBC 276 mcg/dL     POC Glucose Once [[id number]]  (Abnormal) Collected: 08/01/24 1123    Specimen: Blood Updated: 08/01/24 1134     Glucose 242 mg/dL      Comment: : [id number] Yesika Tahira DEEPeter ID: [id number]       Blood Culture ID, PCR - Blood, Arm, Left [[id number]]  (Abnormal) Collected: 07/31/24 1849    Specimen: Blood from Arm, Left Updated: 08/01/24 1117     BCID, PCR Staph spp, not aureus or lugdunensis. Identification by BCID2 PCR.     BOTTLE TYPE Aerobic Bottle            US Renal Bilateral    Result Date: 8/1/2024  US RENAL BILATERAL- 8/1/2024 11:19 AM  HISTORY: Acute kidney injury; E11.628-Type 2 diabetes mellitus with other skin complications; L08.9-Local infection of the skin and subcutaneous tissue, unspecified; M86.9-Osteomyelitis, unspecified; E11.10-Type 2 diabetes mellitus with ketoacidosis without coma; R79.89-Other specified abnormal findings of blood chemistry; I48.91-Unspecified atrial fibrillation;  R41.0-Disorientation, unspecified  COMPARISON: None available.   TECHNIQUE: Multiple longitudinal and transverse real-time sonographic images of the kidneys and urinary bladder are obtained. Report and images stored per institutional and state regulations.    FINDINGS:  Visualized proximal abdominal aorta and IVC are unremarkable.   RIGHT KIDNEY: Right kidney is 10.7 cm in length. Renal cortex is unremarkable. There is no hydronephrosis. There is an exophytic anechoic simple right renal cyst measuring 4.9 cm..  LEFT KIDNEY: Left kidney is 11.3 cm in length. Renal cortex is unremarkable. There is no left hydronephrosis.  PELVIS: Urinary bladder is unremarkable.       Impression:  1. Simple right renal cyst. 2. Otherwise unremarkable kidneys and no hydronephrosis.    This report was signed and finalized on 8/1/2024 1:02 PM by Eran Christina.      CT Abdomen Pelvis With Contrast    Result Date: 8/1/2024  EXAMINATION: CT ABDOMEN PELVIS W CONTRAST-   7/31/2024 10:41 PM  HISTORY: abdominal distention with pain; M86.9-Osteomyelitis, unspecified; E11.10-Type 2 diabetes mellitus with ketoacidosis without coma; R79.89-Other specified abnormal findings of blood chemistry; I48.91-Unspecified atrial fibrillation; R41.0-Disorientation, unspecified  In order to have a CT radiation dose as low as reasonably achievable Automated Exposure Control was utilized for adjustment of the mA and/or KV according to patient size.  Total DLP = 1841.72 mGy.cm  The CT scan of the abdomen and pelvis is performed after intravenous contrast enhancement.  The images are acquired in axial plane and subsequent reconstruction in coronal and sagittal planes.  Comparison is made with the previous study dated 6/27/2024.  The lung bases included in the study show a trace right and small left basal pleural effusion. There are mild atelectatic changes at bilateral bases left more than the right.  Limited visualized cardiomediastinal structures show  atheromatous changes of the coronary arteries. There is moderate cardiomegaly.  The liver and spleen are normal.  The gallbladder is surgically absent.  Fatty infiltrated pancreas seen. No focal abnormality. No ductal dilatation. The adrenal glands are normal.  There is persistent bilateral significant perinephric fat infiltration and thickening of the pararenal fascia which is similar to the previous study. Bilateral nephrogram is normal and symmetrical. No calculi. No hydronephrosis. There is a well-defined sharply marginated low density exophytic mass from the lower pole of the right kidney measuring 4.4 cm in diameter. CT density suggest a cyst. Limited visualized ureters are normal and nondilated. The urinary bladder is well distended. No intrinsic abnormality.  Prostate is not significantly enlarged.  There are small fat-containing inguinal hernias, right larger than the left.  There is subcutaneous fat infiltration of the entire abdominal wall extending into the lower extremity. This may represent a fluid overload?.  The stomach is decompressed with moderate wall thickening. No focal abnormality Alamast. Duodenum is normal. Small bowel is nondistended and nondilated. Appendix is surgically absent. There is significant large volume stool throughout the colon. No finding to suggest obstruction.  Atheromatous changes of the abdominal aorta and iliac arteries. No aneurysmal dilatation.  Moderately prominent nonspecific retroperitoneal para-aortic lymph nodes predominantly in the mid abdomen. A referenced left para iliac lymph node, image #57 in series 2 and image #40 and series 3, measures 1.6 cm in short axis. There is a large lymph node in the left lower anterior pelvis/external iliac group measuring 1.3 cm in short axis. There are moderately prominent inguinal lymph nodes. A left inguinal lymph node measures 2 cm in short axis.  Images reviewed in bone window show chronic degenerative changes of the lumbar  spine. No acute bony abnormality.      Impression: 1. A significant perinephric fat stranding is similar to the previous study and is a nonspecific finding. Possibility for chronic inflammatory process/chronic pyelonephritis may not be excluded. Renal functions are normal and symmetrical. 2. Fatty infiltration of the pancreas. No mass. No ductal dilatation. 3. Nonspecific abdominal, pelvic and inguinal lymphadenopathy. The etiology and clinical significance is not certain. This appears moderately more progressive since the previous study. 4. Diffuse subcutaneous fat infiltration of the abdominal wall extending into the lower extremity may represent fluid overload?. 5. A significant large volume of stool in the colon without evidence of obstruction. This may represent constipation.  The above study was initially reviewed and reported by [employer]. I do not find any discrepancies.           This report was signed and finalized on 8/1/2024 7:50 AM by Dr. Carlos Cutler MD.      CT Head Without Contrast    Result Date: 8/1/2024  EXAMINATION: CT HEAD WO CONTRAST-   7/31/2024 10:41 PM  HISTORY: altered mental status; M86.9-Osteomyelitis, unspecified; E11.10-Type 2 diabetes mellitus with ketoacidosis without coma; R79.89-Other specified abnormal findings of blood chemistry; I48.91-Unspecified atrial fibrillation; R41.0-Disorientation, unspecified  In order to have a CT radiation dose as low as reasonably achievable Automated Exposure Control was utilized for adjustment of the mA and/or KV according to patient size.  Total DLP = 783.72 mGy.cm  The CT scan of the head is performed without intravenous contrast enhancement.  The images are acquired in axial plane and subsequent reconstruction with coronal and sagittal planes.  Comparison is made with the previous study dated 5/3/2024.  There is no evidence of a mass. There is no midline shift.  There is no evidence of intracranial hemorrhage or hematoma.  Moderately dilated  ventricles, basal cisterns and the cortical sulci are similar to the previous study representing chronic volume loss.  Areas of chronic white matter ischemia bilaterally are noted. The gray-white matter differentiation is maintained.  Posterior fossa structures are normal.  The images reviewed in bone window show no acute displaced skull fracture. A subtle nondisplaced fracture or lesion may be obscured due to motion artifacts. Large mucous retention cyst is seen in the right maxillary antrum. The remaining paranasal sinuses and mastoid air cells are clear.      Impression: 1. No acute intracranial abnormality. 2. Chronic ischemic and atrophic changes. 3. Chronic maxillary sinusitis.  The above study was initially reviewed and reported by [facility]. I do not find any discrepancies.             This report was signed and finalized on 8/1/2024 5:27 AM by Dr. Carlos Cutler MD.      XR Foot 3+ View Left    Result Date: 7/31/2024  EXAMINATION:  XR FOOT 3+ VW LEFT-  7/31/2024 6:22 PM  HISTORY: Heel wound.  COMPARISON: 3/26/2024.  TECHNIQUE: 3 views were obtained.  FINDINGS: There is a deep ulcer on the sole of the foot posteriorly. The ulcer appears to be packed with some type of radiopaque material. On the lateral image, there may be a small area of bony erosion involving the calcaneus just posterior to the plantar calcaneal spur. A small area of osteomyelitis is not ruled out. There has been prior amputation of the fifth toe and distal fifth metatarsal. There may have been prior resection of the distal fourth metacarpal and a portion of the proximal phalanx of the fourth toe versus chronic erosive change. The appearance is stable. There is severe narrowing of the first MTP joint. There is narrowing of some of the interphalangeal joints. There is some spurring in the tarsal region and at the tarsal-metatarsal junction. There is soft tissue swelling of the foot diffusely. There is a small curvilinear foreign body in the  soft tissues along the sole of the foot in the second toe region in the proximal phalanx area. There is another small linear foreign body in the soft tissues adjacent to the distal phalanx of the great toe.       Impression: 1. Deep ulcer on the sole of the foot posteriorly near the calcaneus. There is a questionable small area of bony erosion along the plantar surface of the calcaneus just proximal to the plantar calcaneal spur. Osteomyelitis cannot be ruled out. The soft tissue ulcer is packed with some type of radiopaque material. 2. Linear foreign bodies projected over the soft tissues of the second toe and first toe. Artifacts are also included in the differential. 3. Other chronic changes, as discussed.   This report was signed and finalized on 7/31/2024 7:47 PM by Dr. Raz Mendes MD.      XR Chest 1 View    Result Date: 7/31/2024  EXAMINATION:  XR CHEST 1 VW-  7/31/2024 6:22 PM  HISTORY: Altered mental status. Hypertension and diabetes.  COMPARISON: 6/28/2024.  TECHNIQUE: Single view AP image.  FINDINGS: There is hypoventilation with vascular crowding. There is mild bronchial wall thickening, stable. There is no dense infiltrate or effusion. Heart size is borderline. Prior heart bypass surgery. Prior cervical fusion. No definite acute bony abnormality.       Impression: 1. Hypoventilation with vascular crowding. 2. Mild bronchial wall thickening, stable.    This report was signed and finalized on 7/31/2024 7:41 PM by Dr. Raz Mendes MD.       Assessment / Plan   Assessment and Plan    Active Hospital Problems    Diagnosis     **DKA, type 2, not at goal     Atrial fibrillation     Chronic heart failure with preserved ejection fraction (HFpEF)     Chronic diastolic heart failure     GERD without esophagitis     Diabetic ulcer of left foot associated with type 2 diabetes mellitus    69-year-old male who presents hospital with DKA, sepsis from left diabetic foot ulcer, and A-fib with RVR.  Glucose now  controlled and anion gap closed.  Tolerating long-acting and sliding scale insulin.  Remains in A-fib but rate controlled in the 80s today.  Hemodynamics been stable.  ID following and has patient on vancomycin course for his left foot wound.  Has history of osteomyelitis in past.    Atrial fibrillation  -Remains in A-fib, rate controlled in the 80s  -Not currently on any rate limiting medicines  -Takes Coreg at home, will restart this once BP is robust enough     DKA/type 2 diabetes mellitus  -Blood glucose controlled, on 10 units of Lantus and sliding scale insulin  -Continue current insulin regimen  -Poorly controlled at home and question of compliance with any insulin  -Diabetes educator consult     Diabetic foot ulcer  -chronic open wound, DC ulcer stage 2-3 to left heal with larger area soft pale tissue with surrounding edema/erythema and weeping at the outer aspect of the foot along the area of the 5th metatarsal.   -Hx of osteomyelitis and related MRSA bacteremia   -Vancomycin course per infectious disease  -wound care   -ID is managing, follow their recs     CKD-IV  -bun/creatinine trending down  -Nephrology following and recommending gentle IV fluid hydration  -Monitor IV fluids closely given history of diastolic heart failure  -Diuresis as indicated, on Bumex at home, will hold diuretic for now given WANDER/ATN  -monitor lytes and urine output   -avoid neph toxic medications        VTE Prophylaxis:     Pharmacologic VTE prophylaxis orders are present.    50 minutes minimum spent on patient, MDM high     This time included obtaining a history; examining the patient; pulse oximetry; ordering and review of studies; arranging urgent treatment with development of a management plan; evaluation of patient's response to treatment; frequent reassessment; and, discussions with other providers.     Please see rest of the note for further information on patient assessment, MDM, and treatment.     Part of this note may  be an electronic transcription/translation of spoken language to printed text using the Dragon dictation system     Electronically signed by SUSAN Tom on 8/2/2024 at 10:47 CDT     Addendum 8/2/2024 @ 7915  Pt received floor bed rm 365. Report given to Dr Keyes with . Pt accepted to the  service under his care.

## 2024-08-02 NOTE — PLAN OF CARE
Goal Outcome Evaluation:         Patient stable overnight. Temps 96's. Urine output good via urinal while standing with assist. Complaints of pain treated with ordered meds as available. Glucose 140 at bedtime check. BP's within acceptable range. O2 sats excellent on room air. Safety maintained.

## 2024-08-03 PROBLEM — N39.0 E. COLI UTI: Status: ACTIVE | Noted: 2024-08-03

## 2024-08-03 PROBLEM — B96.20 E. COLI UTI: Status: ACTIVE | Noted: 2024-08-03

## 2024-08-03 LAB
ALBUMIN SERPL-MCNC: 3.3 G/DL (ref 3.5–5.2)
ALBUMIN/GLOB SERPL: 1.2 G/DL
ALP SERPL-CCNC: 88 U/L (ref 39–117)
ALT SERPL W P-5'-P-CCNC: 7 U/L (ref 1–41)
ANION GAP SERPL CALCULATED.3IONS-SCNC: 14 MMOL/L (ref 5–15)
AST SERPL-CCNC: 12 U/L (ref 1–40)
BASOPHILS # BLD AUTO: 0.05 10*3/MM3 (ref 0–0.2)
BASOPHILS NFR BLD AUTO: 0.9 % (ref 0–1.5)
BILIRUB SERPL-MCNC: 0.3 MG/DL (ref 0–1.2)
BUN SERPL-MCNC: 31 MG/DL (ref 8–23)
BUN/CREAT SERPL: 17 (ref 7–25)
CALCIUM SPEC-SCNC: 8.3 MG/DL (ref 8.6–10.5)
CHLORIDE SERPL-SCNC: 110 MMOL/L (ref 98–107)
CO2 SERPL-SCNC: 19 MMOL/L (ref 22–29)
CREAT SERPL-MCNC: 1.82 MG/DL (ref 0.76–1.27)
DEPRECATED RDW RBC AUTO: 46.3 FL (ref 37–54)
EGFRCR SERPLBLD CKD-EPI 2021: 40 ML/MIN/1.73
EOSINOPHIL # BLD AUTO: 0.64 10*3/MM3 (ref 0–0.4)
EOSINOPHIL NFR BLD AUTO: 11.1 % (ref 0.3–6.2)
ERYTHROCYTE [DISTWIDTH] IN BLOOD BY AUTOMATED COUNT: 14.5 % (ref 12.3–15.4)
GLOBULIN UR ELPH-MCNC: 2.8 GM/DL
GLUCOSE BLDC GLUCOMTR-MCNC: 118 MG/DL (ref 70–130)
GLUCOSE BLDC GLUCOMTR-MCNC: 149 MG/DL (ref 70–130)
GLUCOSE BLDC GLUCOMTR-MCNC: 155 MG/DL (ref 70–130)
GLUCOSE BLDC GLUCOMTR-MCNC: 158 MG/DL (ref 70–130)
GLUCOSE SERPL-MCNC: 125 MG/DL (ref 65–99)
HCT VFR BLD AUTO: 33.2 % (ref 37.5–51)
HGB BLD-MCNC: 10.3 G/DL (ref 13–17.7)
IMM GRANULOCYTES # BLD AUTO: 0.03 10*3/MM3 (ref 0–0.05)
IMM GRANULOCYTES NFR BLD AUTO: 0.5 % (ref 0–0.5)
LYMPHOCYTES # BLD AUTO: 1.2 10*3/MM3 (ref 0.7–3.1)
LYMPHOCYTES NFR BLD AUTO: 20.9 % (ref 19.6–45.3)
MCH RBC QN AUTO: 26.8 PG (ref 26.6–33)
MCHC RBC AUTO-ENTMCNC: 31 G/DL (ref 31.5–35.7)
MCV RBC AUTO: 86.2 FL (ref 79–97)
MONOCYTES # BLD AUTO: 0.5 10*3/MM3 (ref 0.1–0.9)
MONOCYTES NFR BLD AUTO: 8.7 % (ref 5–12)
NEUTROPHILS NFR BLD AUTO: 3.33 10*3/MM3 (ref 1.7–7)
NEUTROPHILS NFR BLD AUTO: 57.9 % (ref 42.7–76)
NRBC BLD AUTO-RTO: 0 /100 WBC (ref 0–0.2)
PLATELET # BLD AUTO: 215 10*3/MM3 (ref 140–450)
PMV BLD AUTO: 12.1 FL (ref 6–12)
POTASSIUM SERPL-SCNC: 3.8 MMOL/L (ref 3.5–5.2)
PROT SERPL-MCNC: 6.1 G/DL (ref 6–8.5)
RBC # BLD AUTO: 3.85 10*6/MM3 (ref 4.14–5.8)
SODIUM SERPL-SCNC: 143 MMOL/L (ref 136–145)
VANCOMYCIN TROUGH SERPL-MCNC: 10 MCG/ML (ref 5–20)
WBC NRBC COR # BLD AUTO: 5.75 10*3/MM3 (ref 3.4–10.8)

## 2024-08-03 PROCEDURE — 80053 COMPREHEN METABOLIC PANEL: CPT | Performed by: INTERNAL MEDICINE

## 2024-08-03 PROCEDURE — 99232 SBSQ HOSP IP/OBS MODERATE 35: CPT | Performed by: INTERNAL MEDICINE

## 2024-08-03 PROCEDURE — 80202 ASSAY OF VANCOMYCIN: CPT | Performed by: FAMILY MEDICINE

## 2024-08-03 PROCEDURE — 25010000002 ONDANSETRON PER 1 MG: Performed by: FAMILY MEDICINE

## 2024-08-03 PROCEDURE — 63710000001 INSULIN GLARGINE PER 5 UNITS: Performed by: INTERNAL MEDICINE

## 2024-08-03 PROCEDURE — 25810000003 SODIUM CHLORIDE 0.9 % SOLUTION: Performed by: FAMILY MEDICINE

## 2024-08-03 PROCEDURE — 25810000003 SODIUM CHLORIDE 0.9 % SOLUTION 250 ML FLEX CONT: Performed by: INTERNAL MEDICINE

## 2024-08-03 PROCEDURE — 82948 REAGENT STRIP/BLOOD GLUCOSE: CPT

## 2024-08-03 PROCEDURE — 25010000002 VANCOMYCIN 10 G RECONSTITUTED SOLUTION: Performed by: FAMILY MEDICINE

## 2024-08-03 PROCEDURE — 63710000001 INSULIN REGULAR HUMAN PER 5 UNITS: Performed by: INTERNAL MEDICINE

## 2024-08-03 PROCEDURE — 25010000002 ENOXAPARIN PER 10 MG: Performed by: INTERNAL MEDICINE

## 2024-08-03 PROCEDURE — 25810000003 SODIUM CHLORIDE 0.9 % SOLUTION: Performed by: INTERNAL MEDICINE

## 2024-08-03 PROCEDURE — 25010000002 NA FERRIC GLUC CPLX PER 12.5 MG: Performed by: INTERNAL MEDICINE

## 2024-08-03 PROCEDURE — 85025 COMPLETE CBC W/AUTO DIFF WBC: CPT

## 2024-08-03 RX ORDER — ONDANSETRON 2 MG/ML
4 INJECTION INTRAMUSCULAR; INTRAVENOUS EVERY 6 HOURS PRN
Status: DISCONTINUED | OUTPATIENT
Start: 2024-08-03 | End: 2024-08-12 | Stop reason: HOSPADM

## 2024-08-03 RX ORDER — CALCITRIOL 0.25 UG/1
0.25 CAPSULE, LIQUID FILLED ORAL DAILY
Status: DISCONTINUED | OUTPATIENT
Start: 2024-08-03 | End: 2024-08-04

## 2024-08-03 RX ADMIN — PREGABALIN 50 MG: 25 CAPSULE ORAL at 08:18

## 2024-08-03 RX ADMIN — DONEPEZIL HYDROCHLORIDE 10 MG: 10 TABLET, FILM COATED ORAL at 08:23

## 2024-08-03 RX ADMIN — FAMOTIDINE 20 MG: 20 TABLET ORAL at 20:33

## 2024-08-03 RX ADMIN — PANTOPRAZOLE SODIUM 40 MG: 40 TABLET, DELAYED RELEASE ORAL at 20:33

## 2024-08-03 RX ADMIN — CALCITRIOL 0.25 MCG: 0.25 CAPSULE ORAL at 15:12

## 2024-08-03 RX ADMIN — PREGABALIN 100 MG: 100 CAPSULE ORAL at 20:33

## 2024-08-03 RX ADMIN — OXYCODONE HYDROCHLORIDE 10 MG: 5 TABLET ORAL at 08:19

## 2024-08-03 RX ADMIN — SODIUM CHLORIDE 50 ML/HR: 9 INJECTION, SOLUTION INTRAVENOUS at 21:29

## 2024-08-03 RX ADMIN — FAMOTIDINE 20 MG: 20 TABLET ORAL at 08:37

## 2024-08-03 RX ADMIN — PANTOPRAZOLE SODIUM 40 MG: 40 TABLET, DELAYED RELEASE ORAL at 08:18

## 2024-08-03 RX ADMIN — Medication 10 ML: at 08:24

## 2024-08-03 RX ADMIN — OXYCODONE HYDROCHLORIDE AND ACETAMINOPHEN 1000 MG: 500 TABLET ORAL at 08:23

## 2024-08-03 RX ADMIN — SODIUM BICARBONATE 650 MG: 650 TABLET ORAL at 08:22

## 2024-08-03 RX ADMIN — OXYCODONE HYDROCHLORIDE 10 MG: 5 TABLET ORAL at 20:32

## 2024-08-03 RX ADMIN — SODIUM CHLORIDE 750 MG: 9 INJECTION, SOLUTION INTRAVENOUS at 21:21

## 2024-08-03 RX ADMIN — Medication 10 ML: at 20:33

## 2024-08-03 RX ADMIN — DOCUSATE SODIUM 50 MG AND SENNOSIDES 8.6 MG 2 TABLET: 8.6; 5 TABLET, FILM COATED ORAL at 08:18

## 2024-08-03 RX ADMIN — ENOXAPARIN SODIUM 135 MG: 150 INJECTION SUBCUTANEOUS at 08:36

## 2024-08-03 RX ADMIN — SODIUM CHLORIDE 250 MG: 9 INJECTION, SOLUTION INTRAVENOUS at 08:33

## 2024-08-03 RX ADMIN — Medication 10 ML: at 08:39

## 2024-08-03 RX ADMIN — INSULIN HUMAN 3 UNITS: 100 INJECTION, SOLUTION PARENTERAL at 20:33

## 2024-08-03 RX ADMIN — SODIUM BICARBONATE 650 MG: 650 TABLET ORAL at 20:33

## 2024-08-03 RX ADMIN — SODIUM HYPOCHLORITE: 1.25 SOLUTION TOPICAL at 20:34

## 2024-08-03 RX ADMIN — ENOXAPARIN SODIUM 135 MG: 150 INJECTION SUBCUTANEOUS at 20:32

## 2024-08-03 RX ADMIN — DOCUSATE SODIUM 50 MG AND SENNOSIDES 8.6 MG 2 TABLET: 8.6; 5 TABLET, FILM COATED ORAL at 20:33

## 2024-08-03 RX ADMIN — INSULIN GLARGINE 10 UNITS: 100 INJECTION, SOLUTION SUBCUTANEOUS at 08:19

## 2024-08-03 RX ADMIN — DULOXETINE HYDROCHLORIDE 60 MG: 30 CAPSULE, DELAYED RELEASE ORAL at 08:22

## 2024-08-03 RX ADMIN — ONDANSETRON 4 MG: 2 INJECTION INTRAMUSCULAR; INTRAVENOUS at 19:20

## 2024-08-03 RX ADMIN — POLYETHYLENE GLYCOL 3350 17 G: 17 POWDER, FOR SOLUTION ORAL at 08:18

## 2024-08-03 RX ADMIN — SODIUM BICARBONATE 650 MG: 650 TABLET ORAL at 18:10

## 2024-08-03 RX ADMIN — ROSUVASTATIN CALCIUM 10 MG: 10 TABLET, FILM COATED ORAL at 20:33

## 2024-08-03 RX ADMIN — Medication 2.5 MG: at 20:33

## 2024-08-03 NOTE — PROGRESS NOTES
"Infectious Diseases Progress Note    Patient:  Erick Luong  YOB: 1955  MRN: [id number]   Admit date: 7/31/2024   Admitting Physician: Payam Keyes DO  Primary Care Physician: Del Shetty MD    Chief Complaint/Interval History: He indicates he is feeling better overall.  He has no new symptoms.  The abdominal symptoms he had previously have shown improvement.  He is without fever.  He is hemodynamically stable.  He has excellent oxygen saturation on room air.    Intake/Output Summary (Last 24 hours) at 8/3/2024 0908  Last data filed at 8/3/2024 0537  Gross per 24 hour   Intake 240 ml   Output 1300 ml   Net -1060 ml     Allergies:   Allergies   Allergen Reactions    Cefepime Hives and Anaphylaxis    Bactrim [Sulfamethoxazole-Trimethoprim] Other (See Comments)     \"RENAL FAILURE\"    Vancomycin Itching    Zolpidem Mental Status Change     \"makes him crazy\"    Metronidazole Rash     Current Scheduled Medications:   ascorbic acid, 1,000 mg, Oral, Daily  donepezil, 10 mg, Oral, Daily  DULoxetine, 60 mg, Oral, Daily  enoxaparin, 1 mg/kg, Subcutaneous, Q12H  famotidine, 20 mg, Oral, Q12H  ferric gluconate, 250 mg, Intravenous, Daily  insulin glargine, 10 Units, Subcutaneous, Daily  insulin regular, 3-14 Units, Subcutaneous, 4x Daily AC & at Bedtime  melatonin, 2.5 mg, Oral, Nightly  oxyCODONE, 10 mg, Oral, BID  pantoprazole, 40 mg, Oral, Q12H  polyethylene glycol, 17 g, Oral, Daily  pregabalin, 100 mg, Oral, Nightly  pregabalin, 50 mg, Oral, Daily  rosuvastatin, 10 mg, Oral, Nightly  senna-docusate sodium, 2 tablet, Oral, BID  sodium bicarbonate, 650 mg, Oral, TID  sodium chloride, 10 mL, Intravenous, Q12H  sodium chloride, 10 mL, Intravenous, Q12H  sodium hypochlorite, , Topical, Q12H  vancomycin, 1,000 mg, Intravenous, Q24H      Pharmacy to Dose enoxaparin (LOVENOX),   sodium chloride, 50 mL/hr, Last Rate: 50 mL/hr (08/02/24 1702)       Current PRN Medications:    senna-docusate sodium " "**AND** polyethylene glycol **AND** bisacodyl **AND** bisacodyl    Calcium Replacement - Follow Nurse / BPA Driven Protocol    dextrose    dextrose    dextrose    glucagon (human recombinant)    Magnesium Standard Dose Replacement - Follow Nurse / BPA Driven Protocol    nitroglycerin    Pharmacy to Dose enoxaparin (LOVENOX)    Phosphorus Replacement - Follow Nurse / BPA Driven Protocol    Potassium Replacement - Follow Nurse / BPA Driven Protocol    sodium chloride    sodium chloride    sodium chloride    sodium chloride    Review of Systems see HPI    Vital Signs:  Temp (24hrs), Av.5 °F (36.4 °C), Min:97.3 °F (36.3 °C), Max:97.8 °F (36.6 °C)    /89 (BP Location: Right arm, Patient Position: Sitting)   Pulse 82   Temp 97.3 °F (36.3 °C) (Oral)   Resp 16   Ht 182.9 cm (72\")   Wt 126 kg (276 lb 12.8 oz)   SpO2 100%   BMI 37.54 kg/m²     Physical Exam  Vital signs - reviewed.  Line/IV site - No erythema, warmth, induration, or tenderness.  Abdomen soft and nontender.  No guarding or rebound.  Lungs without crackles    Left lateral foot postdebridement:      Lab Results:  CBC:   Results from last 7 days   Lab Units 24  0605 24  0524  1849   WBC 10*3/mm3 5.75 9.76 12.83* 15.48*   HEMOGLOBIN g/dL 10.3* 10.0* 9.0* 9.8*   HEMATOCRIT % 33.2* 31.8* 28.3* 29.6*   PLATELETS 10*3/mm3 215 219 176 201     BMP:  Results from last 7 days   Lab Units 24  0605 24  0558 24  04124  0210 24  0021 24  1849   SODIUM mmol/L 143 141 138 136 138 133* 132*   POTASSIUM mmol/L 3.8 3.8 4.1 4.0 4.1 4.4 4.5   CHLORIDE mmol/L 110* 108* 103 103 103 98 97*   CO2 mmol/L 19.0* 19.0* 21.0* 19.0* 21.0* 20.0* 19.0*   BUN mg/dL 31* 37* 50* 51* 52* 54* 55*   CREATININE mg/dL 1.82* 1.98* 2.28* 2.39* 2.46* 2.64* 2.67*   GLUCOSE mg/dL 125* 157* 370* 379* 370* 653* 704*   CALCIUM mg/dL 8.3* 8.5* 8.8 8.7 8.8 8.9 9.0   ALT (SGPT) U/L 7  --   --   --   --   --  " 8     Urinalysis profile:            Component  Ref Range & Units 3 d ago  (7/31/24) 1 mo ago  (6/29/24) 1 mo ago  (6/27/24) 2 mo ago  (5/20/24) 3 mo ago  (5/3/24) 3 mo ago  (4/15/24) 4 mo ago  (3/26/24)   RBC, UA  None Seen, 0-2 /HPF 3-5 Abnormal  None Seen Too Numerous to Count Abnormal  6-10 Abnormal  3-5 Abnormal  0-2 0-2   WBC, UA  None Seen, 0-2 /HPF 6-10 Abnormal  3-5 Abnormal  CM Too Numerous to Count Abnormal  Too Numerous to Count Abnormal  6-10 Abnormal  21-50 Abnormal  0-2 CM   Bacteria, UA  None Seen /HPF 2+ Abnormal  None Seen 4+ Abnormal  4+ Abnormal  None Seen None Seen Trace Abnormal    Squamous Epithelial Cells, UA  None Seen, 0-2 /HPF 0-2 0-2 0-2 3-6 Abnormal  7-12 Abnormal  3-6 Abnormal  3-6 Abnormal    Hyaline Casts, UA  None Seen /LPF None Seen None Seen None Seen None Seen 3-6 0-2 0-2   Methodology Manual Light Microscopy Automated Microscopy Automated Microscopy Automated Microscopy Automated Microscopy Manual Light Microscopy Manual Light Microscopy        Culture Results:   Blood Culture   Date Value Ref Range Status   07/31/2024 No growth at 2 days  Preliminary   07/31/2024 Staphylococcus, coagulase negative (C)  Final   (With regard to the above blood cultures-1 of 4 blood cultures bottles positive for coagulation negative staph-likely contaminant)    Urine Culture   Date Value Ref Range Status   07/31/2024 >100,000 CFU/mL Escherichia coli (A)  Preliminary   (Susceptibility pending)    Wound culture obtained August 2, 2024 labeled drainage left foot:  Rare white blood cells, no organisms, culture pending    Radiology:     CT abdomen and pelvis July 31, 2024:  IMPRESSION:  1. A significant perinephric fat stranding is similar to the previous  study and is a nonspecific finding. Possibility for chronic inflammatory  process/chronic pyelonephritis may not be excluded. Renal functions are  normal and symmetrical.  2. Fatty infiltration of the pancreas. No mass. No ductal dilatation.  3.  Nonspecific abdominal, pelvic and inguinal lymphadenopathy. The  etiology and clinical significance is not certain. This appears  moderately more progressive since the previous study.  4. Diffuse subcutaneous fat infiltration of the abdominal wall extending  into the lower extremity may represent fluid overload?.  5. A significant large volume of stool in the colon without evidence of  obstruction. This may represent constipation.     The above study was initially reviewed and reported by [facility]. I do not  find any discrepancies.    Additional Studies Reviewed: None    Impression:   1.  Diabetic foot infection  2.  Uncontrolled diabetes  3.  Abdominal symptoms-have shown some improvement  4.  Chronic kidney disease  5.  Leukocytosis  6.  Positive blood culture-suspect contaminant    Recommendations:   Continue vancomycin  Continue local wound care  No change in antibiotic recommendations at present  Continue to follow    Daniel Brothers MD

## 2024-08-03 NOTE — PROGRESS NOTES
Nephrology ([facility]) Progress Note      Patient:  Erick Luong  YOB: 1955  Date of Service: 8/3/2024  MRN: [id number]   Acct: [id number]   Primary Care Physician: Del Shetty MD  Advance Directive:   Code Status and Medical Interventions: CPR (Attempt to Resuscitate); Full Support   Ordered at: 08/01/24 0053     Level Of Support Discussed With:    Patient     Code Status (Patient has no pulse and is not breathing):    CPR (Attempt to Resuscitate)     Medical Interventions (Patient has pulse or is breathing):    Full Support     Admit Date: 7/31/2024       Hospital Day: 3  Referring Provider: Abebe Garzon DO      Patient personally seen and examined.  Complete chart including Consults, Notes, Operative Reports, Labs, Cardiology, and Radiology studies reviewed as able.    Chief complaint: Abnormal labs.    Subjective:  Erick Luong is a 69 y.o. male for whom we were consulted for evaluation and treatment of acute kidney injury. Baseline chronic kidney disease stage 3b. Baseline creatinine approximately 1.5-1.8. Follows in our office.  History of poorly controlled type 2 diabetes, hypertension, coronary artery disease, diabetic foot ulcer, obesity.  On 7/31 patient was found wandering at home, confused. Brought to ER for evaluation. Has a nonhealing left foot diabetic ulcer with serosanguineous drainage. Blood glucose level was >700 with A1c >12%. Initial creatinine was 2.67 and has steadily improved since he was admitted. Nephrology consulted on 8/01. Hospital course remarkable for improving renal function and improved blood glucose levels.     He was initially admitted to CCU now transferred to regular floor.  He feels weak today patient is sitting up and eating lunch    Allergies:  Cefepime, Bactrim [sulfamethoxazole-trimethoprim], Vancomycin, Zolpidem, and Metronidazole    Home Meds:  Medications Prior to Admission   Medication Sig Dispense Refill Last Dose     ascorbic acid (VITAMIN C) 1000 MG tablet Take 1 tablet by mouth Daily. 30 tablet 3     bumetanide (BUMEX) 1 MG tablet Take 1 tablet by mouth 2 (Two) Times a Day for 30 days. 60 tablet 0     busPIRone (BUSPAR) 10 MG tablet Take 1 tablet by mouth 2 (Two) Times a Day.       calcitriol (ROCALTROL) 0.5 MCG capsule Take 1 capsule by mouth Daily. 90 capsule 4     carvedilol (COREG) 3.125 MG tablet Take 1 tablet twice a day by oral route. 60 tablet 4     donepezil (ARICEPT) 10 MG tablet Take 1 tablet by mouth Daily. 90 tablet 1     DULoxetine (CYMBALTA) 60 MG capsule Take 1 capsule by mouth Daily. 90 capsule 4     famotidine (PEPCID) 20 MG tablet Take 1 tablet twice a day by oral route. 180 tablet 4     Insulin Glargine (Lantus SoloStar) 100 UNIT/ML injection pen Inject 20 Units under the skin into the appropriate area as directed every night at bedtime. 15 mL 3     Insulin Regular Human, Conc, (HumuLIN R) 500 UNIT/ML solution pen-injector CONCENTRATED injection Inject 40 Units under the skin into the appropriate area as directed 2 (Two) Times a Day Before Meals. Inject 40 units under the skin in the the appropriate area with regular meals AND 40 units with large meals.       Iron-Vitamin C (Vitron-C)  MG tablet Take 1 tablet twice a day by oral route. 60 tablet 2     levocetirizine (XYZAL) 5 MG tablet Take 1 tablet by mouth every day at bedtime. 30 tablet 2     melatonin 3 MG tablet Take 2 tablets by mouth At Night As Needed for Sleep. 30 tablet 0     oxyCODONE (ROXICODONE) 10 MG tablet Take 1 tablet by mouth 2 (Two) Times a Day As Needed. Must last 30 days per md. 55 tablet 0     pantoprazole (PROTONIX) 40 MG EC tablet Take 1 tablet by mouth Every 12 (Twelve) Hours. 180 tablet 4     pregabalin (LYRICA) 100 MG capsule Take 1 capsule by mouth Daily.       pregabalin (LYRICA) 100 MG capsule Take 2 capsules by mouth every night at bedtime.       rosuvastatin (CRESTOR) 10 MG tablet Take 1 tablet by mouth Every Night.  30 tablet 2     sucralfate (CARAFATE) 1 g tablet Take 1 tablet by mouth 4 (Four) Times a Day before meals. 360 tablet 1     Diclofenac Sodium (VOLTAREN) 1 % gel gel Apply 2 g topically to the appropriate area as directed 4 (Four) Times a Day As Needed. 300 g 11     nitroglycerin (NITROSTAT) 0.4 MG SL tablet Place 1 tablet under the tongue Every 5 (Five) Minutes As Needed for Chest Pain. Take no more than 3 doses in 15 minutes.       polyethylene glycol (MIRALAX) 17 GM/SCOOP powder Take 17 g by mouth Daily As Needed (constipation).       sennosides-docusate (PERICOLACE) 8.6-50 MG per tablet Take 1 tablet by mouth Every Night. Obtain OTC          Medicines:  Current Facility-Administered Medications   Medication Dose Route Frequency Provider Last Rate Last Admin    ascorbic acid (VITAMIN C) tablet 1,000 mg  1,000 mg Oral Daily Reuben Garza APRN   1,000 mg at 08/03/24 0823    sennosides-docusate (PERICOLACE) 8.6-50 MG per tablet 2 tablet  2 tablet Oral BID Lamine Figueroa APRN   2 tablet at 08/03/24 0818    And    polyethylene glycol (MIRALAX) packet 17 g  17 g Oral Daily PRN Lamine Figueroa APRN        And    bisacodyl (DULCOLAX) EC tablet 5 mg  5 mg Oral Daily PRN Lamine Figueroa APRN        And    bisacodyl (DULCOLAX) suppository 10 mg  10 mg Rectal Daily PRN Lamine Figueroa APRN   10 mg at 08/02/24 0941    Calcium Replacement - Follow Nurse / BPA Driven Protocol   Does not apply PRN Abebe Garzon DO        dextrose (D50W) (25 g/50 mL) IV injection 10-50 mL  10-50 mL Intravenous Q15 Min PRN Abebe Garzon DO        dextrose (D50W) (25 g/50 mL) IV injection 25 g  25 g Intravenous Q15 Min PRN Lamine Figueroa APRN        dextrose (GLUTOSE) oral gel 15 g  15 g Oral Q15 Min PRN Lamine Figueroa APRN        donepezil (ARICEPT) tablet 10 mg  10 mg Oral Daily Reuben Garza APRN   10 mg at 08/03/24 0823    DULoxetine (CYMBALTA) DR capsule 60 mg  60 mg Oral Daily Reuben Garza APRN   60 mg at  08/03/24 0822    Enoxaparin Sodium (LOVENOX) syringe 135 mg  1 mg/kg Subcutaneous Q12H Abebe Garzon DO   135 mg at 08/03/24 0836    famotidine (PEPCID) tablet 20 mg  20 mg Oral Q12H Reuben Garza APRN   20 mg at 08/03/24 0837    ferric gluconate (FERRLECIT) 250 mg in sodium chloride 0.9 % 250 mL IVPB  250 mg Intravenous Daily Brian Lomas  mL/hr at 08/03/24 0833 250 mg at 08/03/24 0833    glucagon (GLUCAGEN) injection 1 mg  1 mg Intramuscular Q15 Min PRN Lamine Figueroa APRN        insulin glargine (LANTUS, SEMGLEE) injection 10 Units  10 Units Subcutaneous Daily Keren Jamison MD   10 Units at 08/03/24 0819    insulin regular (humuLIN R,novoLIN R) injection 3-14 Units  3-14 Units Subcutaneous 4x Daily AC & at Bedtime Keren Jamison MD   3 Units at 08/02/24 2104    Magnesium Standard Dose Replacement - Follow Nurse / BPA Driven Protocol   Does not apply PRN Abebe Garzon DO        melatonin tablet 2.5 mg  2.5 mg Oral Nightly Reuben Garza APRN   2.5 mg at 08/02/24 2103    nitroglycerin (NITROSTAT) SL tablet 0.4 mg  0.4 mg Sublingual Q5 Min PRN Lamine Figueroa APRN        oxyCODONE (ROXICODONE) immediate release tablet 10 mg  10 mg Oral BID Lamine Figueroa APRN   10 mg at 08/03/24 0819    pantoprazole (PROTONIX) EC tablet 40 mg  40 mg Oral Q12H Reuben Garza APRN   40 mg at 08/03/24 0818    Pharmacy to Dose enoxaparin (LOVENOX)   Does not apply Continuous PRN Abebe Garzon DO        Phosphorus Replacement - Follow Nurse / BPA Driven Protocol   Does not apply PRN Abebe Garzon DO        polyethylene glycol (MIRALAX) packet 17 g  17 g Oral Daily Reuben Garza APRN   17 g at 08/03/24 0818    Potassium Replacement - Follow Nurse / BPA Driven Protocol   Does not apply PRN Abebe Garzon,         pregabalin (LYRICA) capsule 100 mg  100 mg Oral Nightly Reuben Garza APRN   100 mg at 08/02/24 2053    pregabalin (LYRICA) capsule 50 mg  50 mg Oral Daily Greg  SUSAN Alexis   50 mg at 08/03/24 0818    rosuvastatin (CRESTOR) tablet 10 mg  10 mg Oral Nightly Reuben Garza APRN   10 mg at 08/02/24 2051    sodium bicarbonate tablet 650 mg  650 mg Oral TID Brian Lomas MD   650 mg at 08/03/24 0822    sodium chloride 0.9 % flush 10 mL  10 mL Intravenous Q12H Abebe Garzon,    10 mL at 08/03/24 0839    sodium chloride 0.9 % flush 10 mL  10 mL Intravenous PRN Abebe Garzon,         sodium chloride 0.9 % flush 10 mL  10 mL Intravenous Q12H Lamine Figueroa APRN   10 mL at 08/03/24 0824    sodium chloride 0.9 % flush 10 mL  10 mL Intravenous PRN Lamine Figueroa APRN        sodium chloride 0.9 % infusion 40 mL  40 mL Intravenous PRN Abebe Garzon,         sodium chloride 0.9 % infusion 40 mL  40 mL Intravenous PRN Lamine Figueroa APRN        sodium chloride 0.9 % infusion  50 mL/hr Intravenous Continuous Brian Lomas MD 50 mL/hr at 08/02/24 1702 50 mL/hr at 08/02/24 1702    sodium hypochlorite (DAKIN'S 1/4 STRENGTH) 0.125 % topical solution 0.125% solution   Topical Q12H Aby High APRN   Given at 08/02/24 2049    vancomycin (VANCOCIN) 1,000 mg in sodium chloride 0.9 % 250 mL IVPB-VTB  1,000 mg Intravenous Q24H Julia Adrian  mL/hr at 08/02/24 2050 1,000 mg at 08/02/24 2050       Past Medical History:  Past Medical History:   Diagnosis Date    Arthritis     Autonomic disease     CAD (coronary artery disease) 02/06/2017    Cervical radiculopathy 09/16/2021    Chronic constipation with acute exaccerbation 05/10/2021    Coronary artery disease     Degeneration of cervical intervertebral disc 08/11/2021    Diabetes mellitus     Diabetic foot ulcer 08/31/2020    Diabetic polyneuropathy associated with type 2 diabetes mellitus 01/18/2021    Elevated cholesterol     Gastroesophageal reflux disease 05/13/2019    Headache     HTN (hypertension), benign 05/03/2017    Hyperlipidemia     Hypertension     Mixed hyperlipidemia 02/07/2017     Multiple lung nodules 01/26/2020    5mm, 9 mm RLL identified 1/2020, not present 10/2019.    Myocardial infarction     Osteomyelitis 01/22/2020    Osteomyelitis of fifth toe of right foot 10/07/2019    Pancreatitis     Persistent insomnia 01/20/2020    Renal disorder     Sleep apnea 02/06/2017    Sleep apnea with use of continuous positive airway pressure (CPAP)     NON-COMPLIANT    Slow transit constipation 01/16/2019    Spinal stenosis in cervical region 09/16/2021    Vitamin D deficiency 03/02/2021       Past Surgical History:  Past Surgical History:   Procedure Laterality Date    ABDOMINAL SURGERY      AMPUTATION FOOT / TOE Left 10/2021    5th digit     ANTERIOR CERVICAL DISCECTOMY W/ FUSION N/A 08/05/2022    Procedure: CERVICAL DISCECTOMY ANTERIOR WITH FUSION C5-6 with possible plating of C5-7 with neuromonitoring and 1 c-arm;  Surgeon: Karel Soliz MD;  Location: [facility] OR;  Service: Neurosurgery;  Laterality: N/A;    APPENDECTOMY      BACK SURGERY      CARDIAC CATHETERIZATION Left 02/08/2021    Procedure: Left Heart Cath w poss intervention left anatomical snuff box acess;  Surgeon: Omkar Charles DO;  Location: [facility] CATH INVASIVE LOCATION;  Service: Cardiology;  Laterality: Left;    CARDIAC SURGERY      CATARACT EXTRACTION      CERVICAL SPINE SURGERY      COLONOSCOPY N/A 01/31/2017    Normal exam repeat in 5 years    COLONOSCOPY N/A 02/11/2019    Mild acute inflammation    COLONOSCOPY N/A 04/07/2024    2 areas at 10 and 20 cm with friability ulceration 2 clips placed at 20 cm and 4 clips at 10 cm poor prep normal mucosa,mild eroisions and ulcerations in visible vessels    COLONOSCOPY N/A 7/1/2024    Procedure: COLONOSCOPY WITH ANESTHESIA;  Surgeon: Arsalan Lorenzo DO;  Location: [facility] ENDOSCOPY;  Service: Gastroenterology;  Laterality: N/A;  pre op constipation/diarrhea  post poor prep  pcp Del Shetty    COLONOSCOPY N/A 7/2/2024    Procedure: COLONOSCOPY WITH ANESTHESIA;  Surgeon:  Agapito Christopher MD;  Location: [facility] ENDOSCOPY;  Service: Gastroenterology;  Laterality: N/A;  pre rectal bleeding  post poor prep  pcp Del Shetty MD    COLONOSCOPY W/ POLYPECTOMY  2014    Hyperplastic polyp    CORONARY ARTERY BYPASS GRAFT  10/2015    ENDOSCOPY  2011    Gastritis with hemorrhage    ENDOSCOPY N/A 2017    Normal exam    ENDOSCOPY N/A 2019    Gastritis    ENDOSCOPY N/A 2020    Non-erosive gastritis with hemorrhage    ENDOSCOPY N/A 02/10/2021    Esophagitis    ENDOSCOPY N/A 2024    There were esophageal mucosal changes suspicious for short-segment Low's esophagus present in the distal esophagus. The maximum longitudinal extent of these mucosal changes was 2 cm in length. Mucosa was biopsied with a cold forceps for histologyDistal esophagus, biopsies: Mild chronic active esophagogastritis. No evidence of intestinal metaplasia, dysplasia. Antrum, bx, Mild chronic gastritis    FOOT SURGERY Left     INCISION AND DRAINAGE OF WOUND Left 2007    spider bite       Family History  Family History   Problem Relation Age of Onset    Colon cancer Father     Heart disease Father     Colon cancer Sister     Colon polyps Sister     Alzheimer's disease Mother     Coronary artery disease Sister     Coronary artery disease Sister        Social History  Social History     Socioeconomic History    Marital status:    Tobacco Use    Smoking status: Former     Current packs/day: 0.00     Types: Cigarettes     Quit date:      Years since quittin.6    Smokeless tobacco: Never    Tobacco comments:     smoked in [employer]   Vaping Use    Vaping status: Never Used   Substance and Sexual Activity    Alcohol use: No    Drug use: No    Sexual activity: Defer       Review of Systems:  History obtained from chart review and the patient  General ROS: No fever or chills  Respiratory ROS: No cough, shortness of breath, wheezing  Cardiovascular ROS: No chest pain or  palpitations  Gastrointestinal ROS: No abdominal pain or melena  Genito-Urinary ROS: No dysuria or hematuria  Psych ROS: No anxiety and depression  14 point ROS reviewed with the patient and negative except as noted above and in the HPI unless unable to obtain.    Objective:  Patient Vitals for the past 24 hrs:   BP Temp Temp src Pulse Resp SpO2 Weight   08/03/24 1205 134/79 97.3 °F (36.3 °C) Oral 78 16 100 % --   08/03/24 0743 141/89 97.3 °F (36.3 °C) Oral 82 16 100 % --   08/03/24 0522 -- -- -- -- -- -- 126 kg (276 lb 12.8 oz)   08/03/24 0333 115/57 97.6 °F (36.4 °C) Oral 79 16 97 % --   08/02/24 2356 116/56 97.8 °F (36.6 °C) Oral 77 16 97 % --   08/02/24 2100 130/60 97.3 °F (36.3 °C) Oral 81 15 98 % --   08/02/24 1456 141/75 97.5 °F (36.4 °C) Axillary 87 16 99 % --       Intake/Output Summary (Last 24 hours) at 8/3/2024 1334  Last data filed at 8/3/2024 1205  Gross per 24 hour   Intake 480 ml   Output 1575 ml   Net -1095 ml     General: awake/alert   HEENT: Normocephalic atraumatic head  Neck: Supple with no JVD or carotid bruits.  Chest:  clear to auscultation bilaterally without respiratory distress  CVS: regular rate and rhythm  Abdominal: soft, nontender, positive bowel sounds  Extremities: Left foot ulcer at the plantar aspect  Skin:  left foot ulcer and warm and dry without rash      Labs:  Results from last 7 days   Lab Units 08/03/24  0605 08/02/24 0558 08/01/24 0419   WBC 10*3/mm3 5.75 9.76 12.83*   HEMOGLOBIN g/dL 10.3* 10.0* 9.0*   HEMATOCRIT % 33.2* 31.8* 28.3*   PLATELETS 10*3/mm3 215 219 176         Results from last 7 days   Lab Units 08/03/24  0605 08/02/24  0558 08/01/24  0419 07/31/24  2030 07/31/24  1849   SODIUM mmol/L 143 141 138   < > 132*   POTASSIUM mmol/L 3.8 3.8 4.1   < > 4.5   CHLORIDE mmol/L 110* 108* 103   < > 97*   CO2 mmol/L 19.0* 19.0* 21.0*   < > 19.0*   BUN mg/dL 31* 37* 50*   < > 55*   CREATININE mg/dL 1.82* 1.98* 2.28*   < > 2.67*   CALCIUM mg/dL 8.3* 8.5* 8.8   < > 9.0    EGFR mL/min/1.73 40.0* 36.1* 30.5*   < > 25.2*   BILIRUBIN mg/dL 0.3  --   --   --  0.6   ALK PHOS U/L 88  --   --   --  101   ALT (SGPT) U/L 7  --   --   --  8   AST (SGOT) U/L 12  --   --   --  11   GLUCOSE mg/dL 125* 157* 370*   < > 704*    < > = values in this interval not displayed.       Radiology:   Imaging Results (Last 72 Hours)       Procedure Component Value Units Date/Time    US Renal Bilateral [[id number]] Collected: 08/01/24 1300     Updated: 08/01/24 1305    Narrative:      US RENAL BILATERAL- 8/1/2024 11:19 AM     HISTORY: Acute kidney injury; E11.628-Type 2 diabetes mellitus with  other skin complications; L08.9-Local infection of the skin and  subcutaneous tissue, unspecified; M86.9-Osteomyelitis, unspecified;  E11.10-Type 2 diabetes mellitus with ketoacidosis without coma;  R79.89-Other specified abnormal findings of blood chemistry;  I48.91-Unspecified atrial fibrillation; R41.0-Disorientation,  unspecified     COMPARISON: None available.       TECHNIQUE: Multiple longitudinal and transverse real-time sonographic  images of the kidneys and urinary bladder are obtained. Report and  images stored per institutional and state regulations.           FINDINGS:     Visualized proximal abdominal aorta and IVC are unremarkable.        RIGHT KIDNEY: Right kidney is 10.7 cm in length. Renal cortex is  unremarkable. There is no hydronephrosis. There is an exophytic anechoic  simple right renal cyst measuring 4.9 cm..     LEFT KIDNEY: Left kidney is 11.3 cm in length. Renal cortex is  unremarkable. There is no left hydronephrosis.     PELVIS: Urinary bladder is unremarkable.          Impression:         1. Simple right renal cyst.  2. Otherwise unremarkable kidneys and no hydronephrosis.           This report was signed and finalized on 8/1/2024 1:02 PM by Eran Christina.       CT Abdomen Pelvis With Contrast [[id number]] Collected: 08/01/24 0543     Updated: 08/01/24 0754    Narrative:      EXAMINATION:  CT ABDOMEN PELVIS W CONTRAST-      7/31/2024 10:41 PM     HISTORY: abdominal distention with pain; M86.9-Osteomyelitis,  unspecified; E11.10-Type 2 diabetes mellitus with ketoacidosis without  coma; R79.89-Other specified abnormal findings of blood chemistry;  I48.91-Unspecified atrial fibrillation; R41.0-Disorientation,  unspecified     In order to have a CT radiation dose as low as reasonably achievable  Automated Exposure Control was utilized for adjustment of the mA and/or  KV according to patient size.     Total DLP = 1841.72 mGy.cm     The CT scan of the abdomen and pelvis is performed after intravenous  contrast enhancement.     The images are acquired in axial plane and subsequent reconstruction in  coronal and sagittal planes.     Comparison is made with the previous study dated 6/27/2024.     The lung bases included in the study show a trace right and small left  basal pleural effusion. There are mild atelectatic changes at bilateral  bases left more than the right.     Limited visualized cardiomediastinal structures show atheromatous  changes of the coronary arteries. There is moderate cardiomegaly.     The liver and spleen are normal.     The gallbladder is surgically absent.     Fatty infiltrated pancreas seen. No focal abnormality. No ductal  dilatation. The adrenal glands are normal.     There is persistent bilateral significant perinephric fat infiltration  and thickening of the pararenal fascia which is similar to the previous  study. Bilateral nephrogram is normal and symmetrical. No calculi. No  hydronephrosis. There is a well-defined sharply marginated low density  exophytic mass from the lower pole of the right kidney measuring 4.4 cm  in diameter. CT density suggest a cyst. Limited visualized ureters are  normal and nondilated. The urinary bladder is well distended. No  intrinsic abnormality.     Prostate is not significantly enlarged.     There are small fat-containing inguinal hernias, right  larger than the  left.     There is subcutaneous fat infiltration of the entire abdominal wall  extending into the lower extremity. This may represent a fluid  overload?.     The stomach is decompressed with moderate wall thickening. No focal  abnormality [place]. Duodenum is normal. Small bowel is nondistended and  nondilated. Appendix is surgically absent. There is significant large  volume stool throughout the colon. No finding to suggest obstruction.     Atheromatous changes of the abdominal aorta and iliac arteries. No  aneurysmal dilatation.     Moderately prominent nonspecific retroperitoneal para-aortic lymph nodes  predominantly in the mid abdomen. A referenced left para iliac lymph  node, image #57 in series 2 and image #40 and series 3, measures 1.6 cm  in short axis. There is a large lymph node in the left lower anterior  pelvis/external iliac group measuring 1.3 cm in short axis. There are  moderately prominent inguinal lymph nodes. A left inguinal lymph node  measures 2 cm in short axis.     Images reviewed in bone window show chronic degenerative changes of the  lumbar spine. No acute bony abnormality.       Impression:      1. A significant perinephric fat stranding is similar to the previous  study and is a nonspecific finding. Possibility for chronic inflammatory  process/chronic pyelonephritis may not be excluded. Renal functions are  normal and symmetrical.  2. Fatty infiltration of the pancreas. No mass. No ductal dilatation.  3. Nonspecific abdominal, pelvic and inguinal lymphadenopathy. The  etiology and clinical significance is not certain. This appears  moderately more progressive since the previous study.  4. Diffuse subcutaneous fat infiltration of the abdominal wall extending  into the lower extremity may represent fluid overload?.  5. A significant large volume of stool in the colon without evidence of  obstruction. This may represent constipation.     The above study was initially  reviewed and reported by [facility]. I do not  find any discrepancies.                                This report was signed and finalized on 8/1/2024 7:50 AM by Dr. Carlos Cutler MD.       CT Head Without Contrast [[id number]] Collected: 08/01/24 0524     Updated: 08/01/24 0531    Narrative:      EXAMINATION: CT HEAD WO CONTRAST-      7/31/2024 10:41 PM     HISTORY: altered mental status; M86.9-Osteomyelitis, unspecified;  E11.10-Type 2 diabetes mellitus with ketoacidosis without coma;  R79.89-Other specified abnormal findings of blood chemistry;  I48.91-Unspecified atrial fibrillation; R41.0-Disorientation,  unspecified     In order to have a CT radiation dose as low as reasonably achievable  Automated Exposure Control was utilized for adjustment of the mA and/or  KV according to patient size.     Total DLP = 783.72 mGy.cm     The CT scan of the head is performed without intravenous contrast  enhancement.     The images are acquired in axial plane and subsequent reconstruction  with coronal and sagittal planes.     Comparison is made with the previous study dated 5/3/2024.     There is no evidence of a mass. There is no midline shift.     There is no evidence of intracranial hemorrhage or hematoma.     Moderately dilated ventricles, basal cisterns and the cortical sulci are  similar to the previous study representing chronic volume loss.     Areas of chronic white matter ischemia bilaterally are noted. The  gray-white matter differentiation is maintained.     Posterior fossa structures are normal.     The images reviewed in bone window show no acute displaced skull  fracture. A subtle nondisplaced fracture or lesion may be obscured due  to motion artifacts. Large mucous retention cyst is seen in the right  maxillary antrum. The remaining paranasal sinuses and mastoid air cells  are clear.       Impression:      1. No acute intracranial abnormality.  2. Chronic ischemic and atrophic changes.  3. Chronic maxillary  sinusitis.     The above study was initially reviewed and reported by [facility]. I do not  find any discrepancies.                                      This report was signed and finalized on 8/1/2024 5:27 AM by Dr. Carlos Cutler MD.       XR Foot 3+ View Left [[id number]] Collected: 07/31/24 1941     Updated: 07/31/24 1950    Narrative:      EXAMINATION:  XR FOOT 3+ VW LEFT-  7/31/2024 6:22 PM     HISTORY: Heel wound.     COMPARISON: 3/26/2024.     TECHNIQUE: 3 views were obtained.     FINDINGS: There is a deep ulcer on the sole of the foot posteriorly. The  ulcer appears to be packed with some type of radiopaque material. On the  lateral image, there may be a small area of bony erosion involving the  calcaneus just posterior to the plantar calcaneal spur. A small area of  osteomyelitis is not ruled out. There has been prior amputation of the  fifth toe and distal fifth metatarsal. There may have been prior  resection of the distal fourth metacarpal and a portion of the proximal  phalanx of the fourth toe versus chronic erosive change. The appearance  is stable. There is severe narrowing of the first MTP joint. There is  narrowing of some of the interphalangeal joints. There is some spurring  in the tarsal region and at the tarsal-metatarsal junction. There is  soft tissue swelling of the foot diffusely. There is a small curvilinear  foreign body in the soft tissues along the sole of the foot in the  second toe region in the proximal phalanx area. There is another small  linear foreign body in the soft tissues adjacent to the distal phalanx  of the great toe.          Impression:      1. Deep ulcer on the sole of the foot posteriorly near the calcaneus.  There is a questionable small area of bony erosion along the plantar  surface of the calcaneus just proximal to the plantar calcaneal spur.  Osteomyelitis cannot be ruled out. The soft tissue ulcer is packed with  some type of radiopaque material.  2. Linear  foreign bodies projected over the soft tissues of the second  toe and first toe. Artifacts are also included in the differential.  3. Other chronic changes, as discussed.        This report was signed and finalized on 7/31/2024 7:47 PM by Dr. Raz Mendes MD.       XR Chest 1 View [[id number]] Collected: 07/31/24 1940     Updated: 07/31/24 1944    Narrative:      EXAMINATION:  XR CHEST 1 VW-  7/31/2024 6:22 PM     HISTORY: Altered mental status. Hypertension and diabetes.     COMPARISON: 6/28/2024.     TECHNIQUE: Single view AP image.     FINDINGS: There is hypoventilation with vascular crowding. There is mild  bronchial wall thickening, stable. There is no dense infiltrate or  effusion. Heart size is borderline. Prior heart bypass surgery. Prior  cervical fusion. No definite acute bony abnormality.          Impression:      1. Hypoventilation with vascular crowding.  2. Mild bronchial wall thickening, stable.           This report was signed and finalized on 7/31/2024 7:41 PM by Dr. Raz Mendes MD.               Culture:  Blood Culture   Date Value Ref Range Status   07/31/2024 No growth at 24 hours  Preliminary   07/31/2024 Staphylococcus, coagulase negative (C)  Final         Assessment    Acute kidney injury/improving  Acute tubular necrosis.  Stage III CKD baseline  Type II diabetic nephropathy  Benign essential hypertension  Sepsis related to left diabetic foot ulcer  Anemia of CKD  Obesity     Plan:  IV fluid to KVO.  IV antibiotic  Intravenous ferric gluconate  P.o. calcitriol.      Brian Lomas MD  8/3/2024  13:34 CDT

## 2024-08-03 NOTE — PLAN OF CARE
Goal Outcome Evaluation:              Outcome Evaluation: (P) A&O. Up with SBA, up in chair. Room air. Voiding. C/o pain and nausea; see MAR. IVF. Cardiac, diabetic diet. Accuchecks; SSI per order. Drsng changed per order. Safety maintained.

## 2024-08-03 NOTE — PROGRESS NOTES
[facility] Medicine Services  INPATIENT PROGRESS NOTE    Patient Name: Erick Luong  Date of Admission: 7/31/2024  Today's Date: 08/03/24  Length of Stay: 3  Primary Care Physician: Del Shetty MD    Subjective   Chief Complaint: Generalized weakness, DKA  HPI     The patient notes that he is feeling better.  He is sitting in the chair at the foot of his bed at the time of my evaluation.  He has been followed by infectious diseases and nephrology.  Renal function is at baseline with creatinine 1.83.  Heart rate is well-controlled.  He remains in normal sinus rhythm.  He remains on wound care for heel ulcer.  Wound culture of the left foot reveals Streptococcus agalactiae.  Urine culture revealed E. coli.  Coli sensitivities are pending.  He remains empirically on IV vancomycin.    Review of Systems   All pertinent negatives and positives are as above. All other systems have been reviewed and are negative unless otherwise stated.     Objective    Temp:  [97.3 °F (36.3 °C)-97.8 °F (36.6 °C)] 97.3 °F (36.3 °C)  Heart Rate:  [77-87] 78  Resp:  [15-16] 16  BP: (115-141)/(56-89) 134/79  Physical Exam  Constitutional:       Appearance: Normal appearance. He is obese.   HENT:      Head: Normocephalic and atraumatic.      Right Ear: External ear normal.      Left Ear: External ear normal.      Nose: Nose normal.      Mouth/Throat:      Mouth: Mucous membranes are moist.      Pharynx: Oropharynx is clear.   Eyes:      General: No scleral icterus.     Conjunctiva/sclera: Conjunctivae normal.   Cardiovascular:      Rate and Rhythm: Normal rate and regular rhythm.      Pulses: Normal pulses.      Heart sounds: Normal heart sounds.   Pulmonary:      Effort: Pulmonary effort is normal. No respiratory distress.      Breath sounds: Normal breath sounds.   Abdominal:      General: Bowel sounds are normal.      Palpations: Abdomen is soft. There is no mass.      Tenderness: There is no  abdominal tenderness.   Musculoskeletal:         General: Normal range of motion.      Right lower leg: Edema present.      Left lower leg: Edema present.   Skin:     General: Skin is warm and dry.      Comments: Left heel wound covered with appropriate dressing.   Neurological:      General: No focal deficit present.      Mental Status: He is alert and oriented to person, place, and time.   Psychiatric:         Mood and Affect: Mood normal.         Judgment: Judgment normal.       Results Review:  I have reviewed the labs, radiology results, and diagnostic studies.    Laboratory Data:   Results from last 7 days   Lab Units 08/03/24  0605 08/02/24 0558 08/01/24 0419   WBC 10*3/mm3 5.75 9.76 12.83*   HEMOGLOBIN g/dL 10.3* 10.0* 9.0*   HEMATOCRIT % 33.2* 31.8* 28.3*   PLATELETS 10*3/mm3 215 219 176        Results from last 7 days   Lab Units 08/03/24 0605 08/02/24 0558 08/01/24  0419 07/31/24 2030 07/31/24  1849   SODIUM mmol/L 143 141 138   < > 132*   POTASSIUM mmol/L 3.8 3.8 4.1   < > 4.5   CHLORIDE mmol/L 110* 108* 103   < > 97*   CO2 mmol/L 19.0* 19.0* 21.0*   < > 19.0*   BUN mg/dL 31* 37* 50*   < > 55*   CREATININE mg/dL 1.82* 1.98* 2.28*   < > 2.67*   CALCIUM mg/dL 8.3* 8.5* 8.8   < > 9.0   BILIRUBIN mg/dL 0.3  --   --   --  0.6   ALK PHOS U/L 88  --   --   --  101   ALT (SGPT) U/L 7  --   --   --  8   AST (SGOT) U/L 12  --   --   --  11   GLUCOSE mg/dL 125* 157* 370*   < > 704*    < > = values in this interval not displayed.       Culture Data:   Blood Culture   Date Value Ref Range Status   07/31/2024 No growth at 2 days  Preliminary   07/31/2024 Staphylococcus, coagulase negative (C)  Final     Urine Culture   Date Value Ref Range Status   07/31/2024 >100,000 CFU/mL Escherichia coli (A)  Preliminary     Comment:     ESBL confirmation in progress. 8.3     Wound Culture   Date Value Ref Range Status   08/02/2024 (A)  Preliminary    Light growth (2+) Streptococcus agalactiae (Group B)     Comment:        This organism is considered to be universally susceptible to penicillin.  No further antibiotic testing will be performed. If Clindamycin or Erythromycin is the drug of choice, notify the laboratory within 7 days to request susceptibility testing.   08/02/2024 (A)  Preliminary    Light growth (2+) Streptococcus agalactiae (Group B)     Comment:       This organism is considered to be universally susceptible to penicillin.  No further antibiotic testing will be performed. If Clindamycin or Erythromycin is the drug of choice, notify the laboratory within 7 days to request susceptibility testing.       Radiology Data:   Imaging Results (Last 24 Hours)       ** No results found for the last 24 hours. **            I have reviewed the patient's current medications.     Assessment/Plan   Assessment  Active Hospital Problems    Diagnosis     **DKA, type 2, not at goal     E. coli UTI     Atrial fibrillation     Chronic heart failure with preserved ejection fraction (HFpEF)     Chronic diastolic heart failure     GERD without esophagitis     Diabetic ulcer of left foot associated with type 2 diabetes mellitus        Treatment Plan  Continue wound care  IV antibiotics per infectious diseases  PT/OT evaluations for discharge placement assessment  Ambulation encouraged    Medical Decision Making  Number and Complexity of problems:   3 acute, high complexity problems  4+ chronic, moderate complexity problems    Differential Diagnosis: None    Conditions and Status        Condition is improving.     [facility] Data  External documents reviewed: Care Everywhere  Cardiac tracing (EKG, telemetry) interpretation: See HPI  Radiology interpretation: See HPI  Labs reviewed: See HPI  Any tests that were considered but not ordered: None     Decision rules/scores evaluated (example [id number], Wells, etc): None     Discussed with: The patient     Care Planning  Shared decision making: Patient  Code status and discussions: Full  code    Disposition  Social Determinants of Health that impact treatment or disposition: None  I expect the patient to be discharged to home or SNF in 2-3 days.     Electronically signed by Payam Keyes DO, 08/03/24, 13:58 CDT.

## 2024-08-04 LAB
ALBUMIN SERPL-MCNC: 3.2 G/DL (ref 3.5–5.2)
ALBUMIN/GLOB SERPL: 1.1 G/DL
ALP SERPL-CCNC: 95 U/L (ref 39–117)
ALT SERPL W P-5'-P-CCNC: 9 U/L (ref 1–41)
ANION GAP SERPL CALCULATED.3IONS-SCNC: 13 MMOL/L (ref 5–15)
AST SERPL-CCNC: 15 U/L (ref 1–40)
BACTERIA SPEC AEROBE CULT: ABNORMAL
BASOPHILS # BLD AUTO: 0.04 10*3/MM3 (ref 0–0.2)
BASOPHILS NFR BLD AUTO: 0.7 % (ref 0–1.5)
BILIRUB SERPL-MCNC: 0.3 MG/DL (ref 0–1.2)
BUN SERPL-MCNC: 24 MG/DL (ref 8–23)
BUN/CREAT SERPL: 15 (ref 7–25)
CALCIUM SPEC-SCNC: 8.5 MG/DL (ref 8.6–10.5)
CHLORIDE SERPL-SCNC: 111 MMOL/L (ref 98–107)
CO2 SERPL-SCNC: 21 MMOL/L (ref 22–29)
CREAT SERPL-MCNC: 1.6 MG/DL (ref 0.76–1.27)
DEPRECATED RDW RBC AUTO: 45.9 FL (ref 37–54)
EGFRCR SERPLBLD CKD-EPI 2021: 46.6 ML/MIN/1.73
EOSINOPHIL # BLD AUTO: 0.45 10*3/MM3 (ref 0–0.4)
EOSINOPHIL NFR BLD AUTO: 7.9 % (ref 0.3–6.2)
ERYTHROCYTE [DISTWIDTH] IN BLOOD BY AUTOMATED COUNT: 14.6 % (ref 12.3–15.4)
GLOBULIN UR ELPH-MCNC: 2.9 GM/DL
GLUCOSE BLDC GLUCOMTR-MCNC: 106 MG/DL (ref 70–130)
GLUCOSE BLDC GLUCOMTR-MCNC: 113 MG/DL (ref 70–130)
GLUCOSE BLDC GLUCOMTR-MCNC: 117 MG/DL (ref 70–130)
GLUCOSE BLDC GLUCOMTR-MCNC: 126 MG/DL (ref 70–130)
GLUCOSE SERPL-MCNC: 97 MG/DL (ref 65–99)
GRAM STN SPEC: ABNORMAL
HCT VFR BLD AUTO: 30.5 % (ref 37.5–51)
HGB BLD-MCNC: 9.5 G/DL (ref 13–17.7)
IMM GRANULOCYTES # BLD AUTO: 0.06 10*3/MM3 (ref 0–0.05)
IMM GRANULOCYTES NFR BLD AUTO: 1 % (ref 0–0.5)
LYMPHOCYTES # BLD AUTO: 1.15 10*3/MM3 (ref 0.7–3.1)
LYMPHOCYTES NFR BLD AUTO: 20.1 % (ref 19.6–45.3)
MCH RBC QN AUTO: 26.8 PG (ref 26.6–33)
MCHC RBC AUTO-ENTMCNC: 31.1 G/DL (ref 31.5–35.7)
MCV RBC AUTO: 86.2 FL (ref 79–97)
MONOCYTES # BLD AUTO: 0.55 10*3/MM3 (ref 0.1–0.9)
MONOCYTES NFR BLD AUTO: 9.6 % (ref 5–12)
NEUTROPHILS NFR BLD AUTO: 3.48 10*3/MM3 (ref 1.7–7)
NEUTROPHILS NFR BLD AUTO: 60.7 % (ref 42.7–76)
NRBC BLD AUTO-RTO: 0 /100 WBC (ref 0–0.2)
PLATELET # BLD AUTO: 201 10*3/MM3 (ref 140–450)
PMV BLD AUTO: 12.5 FL (ref 6–12)
POTASSIUM SERPL-SCNC: 3.9 MMOL/L (ref 3.5–5.2)
PROT SERPL-MCNC: 6.1 G/DL (ref 6–8.5)
RBC # BLD AUTO: 3.54 10*6/MM3 (ref 4.14–5.8)
SODIUM SERPL-SCNC: 145 MMOL/L (ref 136–145)
WBC NRBC COR # BLD AUTO: 5.73 10*3/MM3 (ref 3.4–10.8)

## 2024-08-04 PROCEDURE — 25010000002 ERTAPENEM PER 500 MG: Performed by: INTERNAL MEDICINE

## 2024-08-04 PROCEDURE — 25010000002 ONDANSETRON PER 1 MG: Performed by: FAMILY MEDICINE

## 2024-08-04 PROCEDURE — 25010000002 ENOXAPARIN PER 10 MG: Performed by: INTERNAL MEDICINE

## 2024-08-04 PROCEDURE — 25010000002 NA FERRIC GLUC CPLX PER 12.5 MG: Performed by: INTERNAL MEDICINE

## 2024-08-04 PROCEDURE — 25810000003 SODIUM CHLORIDE 0.9 % SOLUTION 250 ML FLEX CONT: Performed by: INTERNAL MEDICINE

## 2024-08-04 PROCEDURE — 63710000001 INSULIN GLARGINE PER 5 UNITS: Performed by: INTERNAL MEDICINE

## 2024-08-04 PROCEDURE — 80053 COMPREHEN METABOLIC PANEL: CPT | Performed by: INTERNAL MEDICINE

## 2024-08-04 PROCEDURE — 97162 PT EVAL MOD COMPLEX 30 MIN: CPT

## 2024-08-04 PROCEDURE — 97166 OT EVAL MOD COMPLEX 45 MIN: CPT

## 2024-08-04 PROCEDURE — 97161 PT EVAL LOW COMPLEX 20 MIN: CPT

## 2024-08-04 PROCEDURE — 85025 COMPLETE CBC W/AUTO DIFF WBC: CPT | Performed by: INTERNAL MEDICINE

## 2024-08-04 PROCEDURE — 82948 REAGENT STRIP/BLOOD GLUCOSE: CPT

## 2024-08-04 PROCEDURE — 99232 SBSQ HOSP IP/OBS MODERATE 35: CPT | Performed by: INTERNAL MEDICINE

## 2024-08-04 RX ADMIN — OXYCODONE HYDROCHLORIDE 10 MG: 5 TABLET ORAL at 22:37

## 2024-08-04 RX ADMIN — CALCITRIOL 0.25 MCG: 0.25 CAPSULE ORAL at 08:22

## 2024-08-04 RX ADMIN — FAMOTIDINE 20 MG: 20 TABLET ORAL at 08:19

## 2024-08-04 RX ADMIN — ROSUVASTATIN CALCIUM 10 MG: 10 TABLET, FILM COATED ORAL at 22:37

## 2024-08-04 RX ADMIN — Medication 10 ML: at 08:23

## 2024-08-04 RX ADMIN — Medication 2.5 MG: at 22:37

## 2024-08-04 RX ADMIN — SODIUM CHLORIDE 250 MG: 9 INJECTION, SOLUTION INTRAVENOUS at 09:37

## 2024-08-04 RX ADMIN — SODIUM BICARBONATE 650 MG: 650 TABLET ORAL at 18:05

## 2024-08-04 RX ADMIN — Medication 10 ML: at 22:38

## 2024-08-04 RX ADMIN — Medication 10 ML: at 22:37

## 2024-08-04 RX ADMIN — SODIUM BICARBONATE 650 MG: 650 TABLET ORAL at 08:20

## 2024-08-04 RX ADMIN — OXYCODONE HYDROCHLORIDE 10 MG: 5 TABLET ORAL at 08:20

## 2024-08-04 RX ADMIN — DONEPEZIL HYDROCHLORIDE 10 MG: 10 TABLET, FILM COATED ORAL at 09:37

## 2024-08-04 RX ADMIN — ONDANSETRON 4 MG: 2 INJECTION INTRAMUSCULAR; INTRAVENOUS at 08:20

## 2024-08-04 RX ADMIN — PREGABALIN 50 MG: 25 CAPSULE ORAL at 08:21

## 2024-08-04 RX ADMIN — OXYCODONE HYDROCHLORIDE AND ACETAMINOPHEN 1000 MG: 500 TABLET ORAL at 08:19

## 2024-08-04 RX ADMIN — ENOXAPARIN SODIUM 135 MG: 150 INJECTION SUBCUTANEOUS at 09:38

## 2024-08-04 RX ADMIN — FAMOTIDINE 20 MG: 20 TABLET ORAL at 22:37

## 2024-08-04 RX ADMIN — SODIUM BICARBONATE 650 MG: 650 TABLET ORAL at 22:37

## 2024-08-04 RX ADMIN — ENOXAPARIN SODIUM 135 MG: 150 INJECTION SUBCUTANEOUS at 22:36

## 2024-08-04 RX ADMIN — PREGABALIN 100 MG: 100 CAPSULE ORAL at 22:37

## 2024-08-04 RX ADMIN — DULOXETINE HYDROCHLORIDE 60 MG: 30 CAPSULE, DELAYED RELEASE ORAL at 08:21

## 2024-08-04 RX ADMIN — PANTOPRAZOLE SODIUM 40 MG: 40 TABLET, DELAYED RELEASE ORAL at 08:21

## 2024-08-04 RX ADMIN — ERTAPENEM 1000 MG: 1 INJECTION INTRAMUSCULAR; INTRAVENOUS at 13:57

## 2024-08-04 RX ADMIN — INSULIN GLARGINE 10 UNITS: 100 INJECTION, SOLUTION SUBCUTANEOUS at 08:23

## 2024-08-04 RX ADMIN — PANTOPRAZOLE SODIUM 40 MG: 40 TABLET, DELAYED RELEASE ORAL at 22:37

## 2024-08-04 RX ADMIN — POLYETHYLENE GLYCOL 3350 17 G: 17 POWDER, FOR SOLUTION ORAL at 08:19

## 2024-08-04 RX ADMIN — ONDANSETRON 4 MG: 2 INJECTION INTRAMUSCULAR; INTRAVENOUS at 13:57

## 2024-08-04 RX ADMIN — DOCUSATE SODIUM 50 MG AND SENNOSIDES 8.6 MG 2 TABLET: 8.6; 5 TABLET, FILM COATED ORAL at 08:19

## 2024-08-04 NOTE — PROGRESS NOTES
[facility] Medicine Services  INPATIENT PROGRESS NOTE    Patient Name: Erick Luong  Date of Admission: 7/31/2024  Today's Date: 08/04/24  Length of Stay: 4  Primary Care Physician: Del Shetty MD    Subjective   Chief Complaint: Generalized weakness  HPI     The patient is improving slowly.  He is currently being cleaned up by the nursing staff after a bowel meant accident in his room.  Urine culture positive for ESBL E. coli and infectious diseases has made antibiotic changes to ertapenem.  Renal function is at baseline with creatinine 1.6.  The patient remains in sinus rhythm and continues to receive wound care for his heel ulcer.    Review of Systems   All pertinent negatives and positives are as above. All other systems have been reviewed and are negative unless otherwise stated.     Objective    Temp:  [97.3 °F (36.3 °C)-98 °F (36.7 °C)] 97.3 °F (36.3 °C)  Heart Rate:  [79-84] 81  Resp:  [16-18] 18  BP: (121-145)/(57-93) 145/63  Physical Exam    Constitutional:       Appearance: Normal appearance. He is obese.   HENT:      Head: Normocephalic and atraumatic.      Right Ear: External ear normal.      Left Ear: External ear normal.      Nose: Nose normal.      Mouth/Throat:      Mouth: Mucous membranes are moist.      Pharynx: Oropharynx is clear.   Eyes:      General: No scleral icterus.     Conjunctiva/sclera: Conjunctivae normal.   Cardiovascular:      Rate and Rhythm: Normal rate and regular rhythm.      Pulses: Normal pulses.      Heart sounds: Normal heart sounds.   Pulmonary:      Effort: Pulmonary effort is normal. No respiratory distress.      Breath sounds: Normal breath sounds.   Abdominal:      General: Bowel sounds are normal.      Palpations: Abdomen is soft. There is no mass.      Tenderness: There is no abdominal tenderness.   Musculoskeletal:         General: Normal range of motion.      Right lower leg: Edema present.      Left lower leg: Edema  present.   Skin:     General: Skin is warm and dry.      Comments: Left heel wound covered with appropriate dressing.   Neurological:      General: No focal deficit present.      Mental Status: He is alert and oriented to person, place, and time.   Psychiatric:         Mood and Affect: Mood normal.         Judgment: Judgment normal.     Results Review:  I have reviewed the labs, radiology results, and diagnostic studies.    Laboratory Data:   Results from last 7 days   Lab Units 08/04/24 0615 08/03/24 0605 08/02/24  0558   WBC 10*3/mm3 5.73 5.75 9.76   HEMOGLOBIN g/dL 9.5* 10.3* 10.0*   HEMATOCRIT % 30.5* 33.2* 31.8*   PLATELETS 10*3/mm3 201 215 219        Results from last 7 days   Lab Units 08/04/24 0615 08/03/24  0605 08/02/24 0558 07/31/24 2030 07/31/24  1849   SODIUM mmol/L 145 143 141   < > 132*   POTASSIUM mmol/L 3.9 3.8 3.8   < > 4.5   CHLORIDE mmol/L 111* 110* 108*   < > 97*   CO2 mmol/L 21.0* 19.0* 19.0*   < > 19.0*   BUN mg/dL 24* 31* 37*   < > 55*   CREATININE mg/dL 1.60* 1.82* 1.98*   < > 2.67*   CALCIUM mg/dL 8.5* 8.3* 8.5*   < > 9.0   BILIRUBIN mg/dL 0.3 0.3  --   --  0.6   ALK PHOS U/L 95 88  --   --  101   ALT (SGPT) U/L 9 7  --   --  8   AST (SGOT) U/L 15 12  --   --  11   GLUCOSE mg/dL 97 125* 157*   < > 704*    < > = values in this interval not displayed.       Culture Data:   Blood Culture   Date Value Ref Range Status   07/31/2024 No growth at 3 days  Preliminary   07/31/2024 Staphylococcus, coagulase negative (C)  Final     Urine Culture   Date Value Ref Range Status   07/31/2024 >100,000 CFU/mL Escherichia coli ESBL (A)  Final     Wound Culture   Date Value Ref Range Status   08/02/2024 (A)  Final    Light growth (2+) Streptococcus agalactiae (Group B)     Comment:       This organism is considered to be universally susceptible to penicillin.  No further antibiotic testing will be performed. If Clindamycin or Erythromycin is the drug of choice, notify the laboratory within 7 days to  request susceptibility testing.   08/02/2024 (A)  Final    Light growth (2+) Streptococcus agalactiae (Group B)     Comment:       This organism is considered to be universally susceptible to penicillin.  No further antibiotic testing will be performed. If Clindamycin or Erythromycin is the drug of choice, notify the laboratory within 7 days to request susceptibility testing.       Radiology Data:   Imaging Results (Last 24 Hours)       ** No results found for the last 24 hours. **            I have reviewed the patient's current medications.     Assessment/Plan   Assessment  Active Hospital Problems    Diagnosis     **DKA, type 2, not at goal     E. coli UTI, ESBL     Atrial fibrillation     Chronic heart failure with preserved ejection fraction (HFpEF)     Chronic diastolic heart failure     GERD without esophagitis     Diabetic ulcer of left foot associated with type 2 diabetes mellitus        Treatment Plan  Continue wound care  IV antibiotics per infectious diseases  PT/OT evaluations for discharge placement assessment  Ambulation encouraged     Medical Decision Making  Number and Complexity of problems:   3 acute, high complexity problems  4+ chronic, moderate complexity problems     Differential Diagnosis: None     Conditions and Status        Condition is improving.     [facility] Data  External documents reviewed: Care Everywhere  Cardiac tracing (EKG, telemetry) interpretation: See HPI  Radiology interpretation: See HPI  Labs reviewed: See HPI  Any tests that were considered but not ordered: None     Decision rules/scores evaluated (example [id number], Wells, etc): None     Discussed with: The patient     Care Planning  Shared decision making: Patient  Code status and discussions: Full code     Disposition  Social Determinants of Health that impact treatment or disposition: None  I expect the patient to be discharged to home or SNF in 2-3 days.     Electronically signed by Payam Keyes DO, 08/04/24,  13:58 CDT.

## 2024-08-04 NOTE — PROGRESS NOTES
"INFECTIOUS DISEASES PROGRESS NOTE    Patient:  Erick Luong  YOB: 1955  MRN: [id number]   Admit date: 7/31/2024   Admitting Physician: Payam Keyes DO  Primary Care Physician: Del Shetty MD    Chief Complaint: Requesting a diet Sprite    Interval History: Patient offers no new complaints just was requesting a diet Sprite.  When asked him about his cefepime allergy and what other antibiotics he could tolerate, he did not know.  I asked him what was the last antibiotic Dr. Gomes had him on and he was not aware.    Debrided foot cultures positive for group B streptococcus    Lab called positive ESBL urine culture while I was on the floor          Allergies:   Allergies   Allergen Reactions    Cefepime Hives and Anaphylaxis    Bactrim [Sulfamethoxazole-Trimethoprim] Other (See Comments)     \"RENAL FAILURE\"    Vancomycin Itching    Zolpidem Mental Status Change     \"makes him crazy\"    Metronidazole Rash       Current Scheduled Medications:   ascorbic acid, 1,000 mg, Oral, Daily  calcitriol, 0.25 mcg, Oral, Daily  donepezil, 10 mg, Oral, Daily  DULoxetine, 60 mg, Oral, Daily  enoxaparin, 1 mg/kg, Subcutaneous, Q12H  famotidine, 20 mg, Oral, Q12H  ferric gluconate, 250 mg, Intravenous, Daily  insulin glargine, 10 Units, Subcutaneous, Daily  insulin regular, 3-14 Units, Subcutaneous, 4x Daily AC & at Bedtime  melatonin, 2.5 mg, Oral, Nightly  oxyCODONE, 10 mg, Oral, BID  pantoprazole, 40 mg, Oral, Q12H  polyethylene glycol, 17 g, Oral, Daily  pregabalin, 100 mg, Oral, Nightly  pregabalin, 50 mg, Oral, Daily  rosuvastatin, 10 mg, Oral, Nightly  senna-docusate sodium, 2 tablet, Oral, BID  sodium bicarbonate, 650 mg, Oral, TID  sodium chloride, 10 mL, Intravenous, Q12H  sodium chloride, 10 mL, Intravenous, Q12H  sodium hypochlorite, , Topical, Q12H  vancomycin, 750 mg, Intravenous, Q24H      Current PRN Medications:    senna-docusate sodium **AND** polyethylene glycol **AND** bisacodyl **AND** " "bisacodyl    Calcium Replacement - Follow Nurse / BPA Driven Protocol    dextrose    dextrose    dextrose    glucagon (human recombinant)    Magnesium Standard Dose Replacement - Follow Nurse / BPA Driven Protocol    nitroglycerin    ondansetron    Pharmacy to Dose enoxaparin (LOVENOX)    Phosphorus Replacement - Follow Nurse / BPA Driven Protocol    Potassium Replacement - Follow Nurse / BPA Driven Protocol    sodium chloride    sodium chloride    sodium chloride    sodium chloride    Pharmacy to Dose enoxaparin (LOVENOX),   sodium chloride, 50 mL/hr, Last Rate: 50 mL/hr (24)           Objective     Vital Signs:  Temp (24hrs), Av.6 °F (36.4 °C), Min:97.3 °F (36.3 °C), Max:98 °F (36.7 °C)      /57 (BP Location: Right arm, Patient Position: Lying)   Pulse 79   Temp 97.7 °F (36.5 °C) (Oral)   Resp 18   Ht 182.9 cm (72\")   Wt 131 kg (289 lb)   SpO2 96%   BMI 39.20 kg/m²         Physical Exam:    General: Patient lying in bed on his left side in no acute distress  Respiratory: Effort even and unlabored, he is not conversationally dyspneic  Left foot dressings in place with sock over it.  He indicated it just been changed  Left antecubital IV without any evidence of phlebitis    Left lateral foot postdebridement         Results Review:    I reviewed the patient's new clinical results.    Lab Results:    CBC:   Lab Results   Lab 24  1849 24  0419 24  0558 24  0605 24  0615   WBC 15.48* 12.83* 9.76 5.75 5.73   HEMOGLOBIN 9.8* 9.0* 10.0* 10.3* 9.5*   HEMATOCRIT 29.6* 28.3* 31.8* 33.2* 30.5*   PLATELETS 201 176 219 215 201        AutoDiff:   Lab Results   Lab 24  0558 24  0605 24  0615   NEUTROPHIL % 71.6 57.9 60.7   LYMPHOCYTE % 12.7* 20.9 20.1   MONOCYTES % 8.5 8.7 9.6   EOSINOPHIL % 6.3* 11.1* 7.9*   BASOPHIL % 0.6 0.9 0.7   NEUTROS ABS 6.99 3.33 3.48   LYMPHS ABS 1.24 1.20 1.15   MONOS ABS 0.83 0.50 0.55   EOS ABS 0.61* 0.64* 0.45* "   BASOS ABS 0.06 0.05 0.04        Manual Diff:    Lab Results   Lab 08/02/24  0558 08/03/24  0605 08/04/24  0615   NEUTROS ABS 6.99 3.33 3.48           CMP:   Lab Results   Lab 07/31/24  1849 07/31/24  2030 08/02/24  0558 08/03/24  0605 08/04/24  0615   SODIUM 132*   < > 141 143 145   POTASSIUM 4.5   < > 3.8 3.8 3.9   CHLORIDE 97*   < > 108* 110* 111*   CO2 19.0*   < > 19.0* 19.0* 21.0*   BUN 55*   < > 37* 31* 24*   CREATININE 2.67*   < > 1.98* 1.82* 1.60*   CALCIUM 9.0   < > 8.5* 8.3* 8.5*   BILIRUBIN 0.6  --   --  0.3 0.3   ALK PHOS 101  --   --  88 95   ALT (SGPT) 8  --   --  7 9   AST (SGOT) 11  --   --  12 15   GLUCOSE 704*   < > 157* 125* 97    < > = values in this interval not displayed.       Estimated Creatinine Clearance: 61.9 mL/min (A) (by C-G formula based on SCr of 1.6 mg/dL (H)).    Culture Results:    Microbiology Results (last 10 days)       Procedure Component Value - Date/Time    Wound Culture - Drainage, Foot, Left [[id number]]  (Abnormal) Collected: 08/02/24 1338    Lab Status: Final result Specimen: Drainage from Foot, Left Updated: 08/04/24 0857     Wound Culture Light growth (2+) Streptococcus agalactiae (Group B)     Comment:   This organism is considered to be universally susceptible to penicillin.  No further antibiotic testing will be performed. If Clindamycin or Erythromycin is the drug of choice, notify the laboratory within 7 days to request susceptibility testing.        Gram Stain Rare (1+) WBCs seen      No organisms seen    Wound Culture - Wound, Foot, Left [[id number]]  (Abnormal) Collected: 08/02/24 1032    Lab Status: Final result Specimen: Wound from Foot, Left Updated: 08/04/24 0857     Wound Culture Light growth (2+) Streptococcus agalactiae (Group B)     Comment:   This organism is considered to be universally susceptible to penicillin.  No further antibiotic testing will be performed. If Clindamycin or Erythromycin is the drug of choice, notify the laboratory within 7 days to  request susceptibility testing.        Gram Stain Few (2+) WBCs seen      Rare (1+) Gram positive cocci    Eosinophil Smear - Urine, Urine, Clean Catch [[id number]]  (Normal) Collected: 08/01/24 1547    Lab Status: Final result Specimen: Urine, Clean Catch Updated: 08/02/24 0149     Eosinophil Smear 0 % EOS/100 Cells     MRSA Screen, PCR (Inpatient) - Swab, Nares [[id number]]  (Normal) Collected: 08/01/24 0206    Lab Status: Final result Specimen: Swab from Nares Updated: 08/01/24 0346     MRSA PCR No MRSA Detected    Narrative:      The negative predictive value of this diagnostic test is high and should only be used to consider de-escalating anti-MRSA therapy. A positive result may indicate colonization with MRSA and must be correlated clinically.    Urine Culture - Urine, Straight Cath [[id number]]  (Abnormal)  (Susceptibility) Collected: 07/31/24 1851    Lab Status: Final result Specimen: Urine from Straight Cath Updated: 08/04/24 1129     Urine Culture >100,000 CFU/mL Escherichia coli ESBL    Narrative:      Colonization of the urinary tract without infection is common. Treatment is discouraged unless the patient is symptomatic, pregnant, or undergoing an invasive urologic procedure.  Recent outcomes data supports the use of pip/tazo in the treatment of susceptible ESBL infections for uncomplicated UTI. Consider use of pip/tazo as a carbapenem-sparing regimen in applicable patients.    Susceptibility        Escherichia coli ESBL      MATT      Amikacin Susceptible      Ertapenem Susceptible      Gentamicin Resistant      Levofloxacin Susceptible      Meropenem Susceptible      Nitrofurantoin Susceptible      Piperacillin + Tazobactam Susceptible      Tobramycin Resistant      Trimethoprim + Sulfamethoxazole Resistant                           Blood Culture - Blood, Hand, Left [[id number]]  (Normal) Collected: 07/31/24 1849    Lab Status: Preliminary result Specimen: Blood from Hand, Left Updated: 08/03/24 1916      Blood Culture No growth at 3 days    Blood Culture - Blood, Arm, Left [[id number]]  (Abnormal) Collected: 07/31/24 1849    Lab Status: Final result Specimen: Blood from Arm, Left Updated: 08/02/24 0545     Blood Culture Staphylococcus, coagulase negative     Isolated from Aerobic Bottle     Gram Stain Aerobic Bottle Gram positive cocci in clusters    Narrative:      Probable contaminant requires clinical correlation, susceptibility not performed unless requested by physician.      Blood Culture ID, PCR - Blood, Arm, Left [[id number]]  (Abnormal) Collected: 07/31/24 1849    Lab Status: Final result Specimen: Blood from Arm, Left Updated: 08/01/24 1117     BCID, PCR Staph spp, not aureus or lugdunensis. Identification by BCID2 PCR.     BOTTLE TYPE Aerobic Bottle                 Radiology:   Imaging Results (Last 72 Hours)       Procedure Component Value Units Date/Time    US Renal Bilateral [[id number]] Collected: 08/01/24 1300     Updated: 08/01/24 1305    Narrative:      US RENAL BILATERAL- 8/1/2024 11:19 AM     HISTORY: Acute kidney injury; E11.628-Type 2 diabetes mellitus with  other skin complications; L08.9-Local infection of the skin and  subcutaneous tissue, unspecified; M86.9-Osteomyelitis, unspecified;  E11.10-Type 2 diabetes mellitus with ketoacidosis without coma;  R79.89-Other specified abnormal findings of blood chemistry;  I48.91-Unspecified atrial fibrillation; R41.0-Disorientation,  unspecified     COMPARISON: None available.       TECHNIQUE: Multiple longitudinal and transverse real-time sonographic  images of the kidneys and urinary bladder are obtained. Report and  images stored per institutional and state regulations.           FINDINGS:     Visualized proximal abdominal aorta and IVC are unremarkable.        RIGHT KIDNEY: Right kidney is 10.7 cm in length. Renal cortex is  unremarkable. There is no hydronephrosis. There is an exophytic anechoic  simple right renal cyst measuring 4.9 cm..     LEFT  KIDNEY: Left kidney is 11.3 cm in length. Renal cortex is  unremarkable. There is no left hydronephrosis.     PELVIS: Urinary bladder is unremarkable.          Impression:         1. Simple right renal cyst.  2. Otherwise unremarkable kidneys and no hydronephrosis.           This report was signed and finalized on 8/1/2024 1:02 PM by Eran Christina.                   Active Hospital Problems    Diagnosis     **DKA, type 2, not at goal     E. coli UTI     Atrial fibrillation     Chronic heart failure with preserved ejection fraction (HFpEF)     Chronic diastolic heart failure     GERD without esophagitis     Diabetic ulcer of left foot associated with type 2 diabetes mellitus        IMPRESSION:  Diabetic foot infection-cultures on 2 separate occasions positive for group B streptococcus.  Debrided cultures obtained Per SUSAN Hoffmann.  Appreciate podiatry's assistance  History of osteomyelitis with MRSA status posttreatment with vancomycin and transition to linezolid.    Uncontrolled diabetes mellitus with hemoglobin A1c greater than 12.    Chronic kidney disease stage IIIb with acute kidney injury.  Creatinine continues to trend down.  Urine culture positive for ESBL E. coli.  Reported to straight cath specimen.      RECOMMENDATION:   Discontinue vancomycin  Start ertapenem to cover both ESBL in urine as well as group B streptococcus  Dosing changes per podiatry.   Diabetes management per attending            Julia Adrian MD  08/04/24  11:40 CDT

## 2024-08-04 NOTE — PLAN OF CARE
Goal Outcome Evaluation:  Plan of Care Reviewed With: patient        Progress: no change  Outcome Evaluation: Pt has stated that he doesn't feel well today; PRN nausea meds given x2; poor appetite; BMx2 (both lg); IVF and IV abx per orders; resting comfortably at this time; safety maintained.

## 2024-08-04 NOTE — THERAPY EVALUATION
Patient Name: Erick Luong  : 1955    MRN: [id number]                              Today's Date: 2024       Admit Date: 2024    Visit Dx:     ICD-10-CM ICD-9-CM   1. Diabetic foot infection  E11.628 250.80    L08.9 686.9   2. Osteomyelitis of foot, unspecified laterality, unspecified type  M86.9 730.27   3. Diabetic ketoacidosis without coma associated with type 2 diabetes mellitus  E11.10 250.12   4. Elevated troponin  R79.89 790.6   5. Atrial fibrillation with RVR  I48.91 427.31   6. Confusion  R41.0 298.9   7. Impaired mobility [Z74.09]  Z74.09 799.89     Patient Active Problem List   Diagnosis    Obesity, unspecified obesity severity, unspecified obesity type    Essential hypertension    Type 2 diabetes mellitus with hyperglycemia, with long-term current use of insulin    Nonsmoker    Anemia due to chronic kidney disease    Class 3 severe obesity due to excess calories with body mass index (BMI) of 40.0 to 44.9 in adult    Anasarca    Sleep apnea with use of continuous positive airway pressure (CPAP)    Medically noncompliant    Diabetic ulcer of left foot associated with type 2 diabetes mellitus    Diabetic polyneuropathy associated with type 2 diabetes mellitus    Spinal stenosis in cervical region    Degeneration of cervical intervertebral disc    Cervical radiculopathy    Degeneration of lumbar or lumbosacral intervertebral disc    Cervical myelopathy    Bilateral carpal tunnel syndrome    CAD (coronary artery disease)    GERD without esophagitis    BPH without obstruction/lower urinary tract symptoms    Stage 3b chronic kidney disease    Chronic diastolic heart failure    Type 2 myocardial infarction due to heart failure    Left carpal tunnel syndrome    Syncope and collapse, recurrent episodes    Poorly-controlled hypertension    Rhinovirus    Peripheral vascular disease    Chronic kidney disease (CKD), stage IV (severe)    Diabetic foot infection    Sepsis    Epigastric pain    Chronic  heart failure with preserved ejection fraction (HFpEF)    Sepsis due to methicillin resistant Staphylococcus aureus (MRSA) with encephalopathy without septic shock    Slow transit constipation    CHF exacerbation    CHF (congestive heart failure), NYHA class I, acute on chronic, combined    Rectal bleeding    Acute on chronic heart failure with preserved ejection fraction (HFpEF)    DKA, type 2, not at goal    Atrial fibrillation    E. coli UTI, ESBL     Past Medical History:   Diagnosis Date    Arthritis     Autonomic disease     CAD (coronary artery disease) 02/06/2017    Cervical radiculopathy 09/16/2021    Chronic constipation with acute exaccerbation 05/10/2021    Coronary artery disease     Degeneration of cervical intervertebral disc 08/11/2021    Diabetes mellitus     Diabetic foot ulcer 08/31/2020    Diabetic polyneuropathy associated with type 2 diabetes mellitus 01/18/2021    Elevated cholesterol     Gastroesophageal reflux disease 05/13/2019    Headache     HTN (hypertension), benign 05/03/2017    Hyperlipidemia     Hypertension     Mixed hyperlipidemia 02/07/2017    Multiple lung nodules 01/26/2020    5mm, 9 mm RLL identified 1/2020, not present 10/2019.    Myocardial infarction     Osteomyelitis 01/22/2020    Osteomyelitis of fifth toe of right foot 10/07/2019    Pancreatitis     Persistent insomnia 01/20/2020    Renal disorder     Sleep apnea 02/06/2017    Sleep apnea with use of continuous positive airway pressure (CPAP)     NON-COMPLIANT    Slow transit constipation 01/16/2019    Spinal stenosis in cervical region 09/16/2021    Vitamin D deficiency 03/02/2021     Past Surgical History:   Procedure Laterality Date    ABDOMINAL SURGERY      AMPUTATION FOOT / TOE Left 10/2021    5th digit     ANTERIOR CERVICAL DISCECTOMY W/ FUSION N/A 08/05/2022    Procedure: CERVICAL DISCECTOMY ANTERIOR WITH FUSION C5-6 with possible plating of C5-7 with neuromonitoring and 1 c-arm;  Surgeon: Karel Soliz MD;   Location: [facility] OR;  Service: Neurosurgery;  Laterality: N/A;    APPENDECTOMY      BACK SURGERY      CARDIAC CATHETERIZATION Left 02/08/2021    Procedure: Left Heart Cath w poss intervention left anatomical snuff box acess;  Surgeon: Omkar Charles DO;  Location: [facility] CATH INVASIVE LOCATION;  Service: Cardiology;  Laterality: Left;    CARDIAC SURGERY      CATARACT EXTRACTION      CERVICAL SPINE SURGERY      COLONOSCOPY N/A 01/31/2017    Normal exam repeat in 5 years    COLONOSCOPY N/A 02/11/2019    Mild acute inflammation    COLONOSCOPY N/A 04/07/2024    2 areas at 10 and 20 cm with friability ulceration 2 clips placed at 20 cm and 4 clips at 10 cm poor prep normal mucosa,mild eroisions and ulcerations in visible vessels    COLONOSCOPY N/A 7/1/2024    Procedure: COLONOSCOPY WITH ANESTHESIA;  Surgeon: Arsalan Lorenzo DO;  Location: [facility] ENDOSCOPY;  Service: Gastroenterology;  Laterality: N/A;  pre op constipation/diarrhea  post poor prep  pcp Del Shetty    COLONOSCOPY N/A 7/2/2024    Procedure: COLONOSCOPY WITH ANESTHESIA;  Surgeon: Agapito Christopher MD;  Location: [facility] ENDOSCOPY;  Service: Gastroenterology;  Laterality: N/A;  pre rectal bleeding  post poor prep  pcp Del Shetty MD    COLONOSCOPY W/ POLYPECTOMY  03/04/2014    Hyperplastic polyp    CORONARY ARTERY BYPASS GRAFT  10/2015    ENDOSCOPY  04/13/2011    Gastritis with hemorrhage    ENDOSCOPY N/A 05/05/2017    Normal exam    ENDOSCOPY N/A 02/11/2019    Gastritis    ENDOSCOPY N/A 09/01/2020    Non-erosive gastritis with hemorrhage    ENDOSCOPY N/A 02/10/2021    Esophagitis    ENDOSCOPY N/A 04/11/2024    There were esophageal mucosal changes suspicious for short-segment Low's esophagus present in the distal esophagus. The maximum longitudinal extent of these mucosal changes was 2 cm in length. Mucosa was biopsied with a cold forceps for histologyDistal esophagus, biopsies: Mild chronic active esophagogastritis. No evidence of  intestinal metaplasia, dysplasia. Antrum, bx, Mild chronic gastritis    FOOT SURGERY Left     INCISION AND DRAINAGE OF WOUND Left 09/2007    spider bite      General Information       [facility] Name 08/04/24 1359          Physical Therapy Time and Intention    Document Type evaluation  AMS, DKA (704 in ED on admission)  -     Mode of Treatment co-treatment;physical therapy  -[facility] Name 08/04/24 1359          General Information    Patient Profile Reviewed yes  -     Prior Level of Function independent:;all household mobility;ADL's  tub  -     Existing Precautions/Restrictions fall  -     Barriers to Rehab medically complex;physical barrier  -[facility] Name 08/04/24 1359          Living Environment    People in Home child(julia), dependent;spouse  spouse currently hosptialized.  15 y/o son is staying with his older sister in [place]  -[facility] Name 08/04/24 1359          Home Main Entrance    Number of Stairs, Main Entrance one  -     Stair Railings, Main Entrance none  -LH       Row Name 08/04/24 1359          Stairs Within Home, Primary    Number of Stairs, Within Home, Primary none  -[facility] Name 08/04/24 1359          Cognition    Orientation Status (Cognition) oriented x 4  -[facility] Name 08/04/24 1359          Safety Issues, Functional Mobility    Safety Issues Affecting Function (Mobility) ability to follow commands  -     Impairments Affecting Function (Mobility) balance;endurance/activity tolerance;pain;strength;shortness of breath  -               User Key  (r) = Recorded By, (t) = Taken By, (c) = Cosigned By      Initials Name Provider Type     Daniel Garcia, PT Physical Therapist                   Mobility       [facility] Name 08/04/24 135          Bed Mobility    Bed Mobility supine-sit  -     Supine-Sit [place] (Bed Mobility) standby assist  -[facility] Name 08/04/24 1351          Sit-Stand Transfer    Sit-Stand [place] (Transfers) minimum assist (75% patient  effort)  -LH       Row Name 08/04/24 1359          Gait/Stairs (Locomotion)    [place] Level (Gait) minimum assist (75% patient effort)  -     Distance in Feet (Gait) 20  -               User Key  (r) = Recorded By, (t) = Taken By, (c) = Cosigned By      Initials Name Provider Type     Daniel Garcia, PT Physical Therapist                   Obj/Interventions       Row Name 08/04/24 1359          Range of Motion Comprehensive    General Range of Motion bilateral upper extremity ROM WFL;bilateral lower extremity ROM WFL  -LH       Row Name 08/04/24 1359          Strength Comprehensive (MMT)    General Manual Muscle Testing (MMT) Assessment upper extremity strength deficits identified;lower extremity strength deficits identified  -LH       Row Name 08/04/24 1359          Sensory Assessment (Somatosensory)    Sensory Assessment (Somatosensory) --  chronic N/T B hands and feet, but worse than normal  -               User Key  (r) = Recorded By, (t) = Taken By, (c) = Cosigned By      Initials Name Provider Type     Daniel Garcia, PT Physical Therapist                   Goals/Plan       Row Name 08/04/24 8822          Transfer Goal 1 (PT)    Activity/Assistive Device (Transfer Goal 1, PT) sit-to-stand/stand-to-sit  -     [place] Level/Cues Needed (Transfer Goal 1, PT) standby assist  -     Time Frame (Transfer Goal 1, PT) 10 days  -     Progress/Outcome (Transfer Goal 1, PT) new goal  -LH       Row Name 08/04/24 4135          Gait Training Goal 1 (PT)    Activity/Assistive Device (Gait Training Goal 1, PT) gait (walking locomotion);assistive device use  -     [place] Level (Gait Training Goal 1, PT) contact guard required  -     Distance (Gait Training Goal 1, PT) 40ft  -     Time Frame (Gait Training Goal 1, PT) 10 days  -     Progress/Outcome (Gait Training Goal 1, PT) new goal  -LH       Row Name 08/04/24 6169          Therapy Assessment/Plan (PT)    Planned Therapy Interventions  (PT) balance training;gait training;home exercise program;strengthening;patient/family education;transfer training  -               User Key  (r) = Recorded By, (t) = Taken By, (c) = Cosigned By      Initials Name Provider Type    Daniel Pablo, PT Physical Therapist                   Clinical Impression       Row Name 08/04/24 2946          Pain    Pretreatment Pain Rating 8/10  c/o nausea, shortnes of breath as well  -     Posttreatment Pain Rating 8/10  -     Pain Location generalized  -     Pain Location - knee  -     Pain Intervention(s) Medication (See MAR);Repositioned;Ambulation/increased activity  -       Row Name 08/04/24 3129          Plan of Care Review    Plan of Care Reviewed With patient  -     Outcome Evaluation PT IE complete.  A&Ox4.  C/o 8/10.  Pt is a high fall risk.  Today he is SBA bed mobility.  Min A sit<>stand.  Ambulated 20ft min A x1 reaching for wall to balance.  Wearing surgical shoe on L foot.  Pt would benefit from a RW for now.  Does not use AD at baseline.  PT to see for gait safety, balance, and strengthening.  Recommend SNF at [place].  Thank you for referral.  -       Row Name 08/04/24 9310          Therapy Assessment/Plan (PT)    Patient/Family Therapy Goals Statement (PT) return home  -     Rehab Potential (PT) good, to achieve stated therapy goals  -     Criteria for Skilled Interventions Met (PT) yes;skilled treatment is necessary  -     Therapy Frequency (PT) 2 times/day  -     Predicted Duration of Therapy Intervention (PT) until [place]  -       Row Name 08/04/24 0168          Positioning and Restraints    Pre-Treatment Position in bed  -     Post Treatment Position chair  -     In Chair reclined;call light within reach;encouraged to call for assist;legs elevated  -               User Key  (r) = Recorded By, (t) = Taken By, (c) = Cosigned By      Initials Name Provider Type    Daniel Pablo, PT Physical Therapist                   Outcome  Measures       Row Name 08/04/24 1359 08/04/24 0820       How much help from another person do you currently need...    Turning from your back to your side while in flat bed without using bedrails? 4  - 4  -DF    Moving from lying on back to sitting on the side of a flat bed without bedrails? 3  - 4  -DF    Moving to and from a bed to a chair (including a wheelchair)? 3  - 3  -DF    Standing up from a chair using your arms (e.g., wheelchair, bedside chair)? 3  - 3  -DF    Climbing 3-5 steps with a railing? 2  - 3  -DF    To walk in hospital room? 2  - 3  -DF    AM-PAC 6 Clicks Score (PT) 17  - 20  -DF    Highest Level of Mobility Goal 5 --> Static standing  - 6 --> Walk 10 steps or more  -DF      Row Name 08/04/24 1359          Functional Assessment    Outcome Measure Options AM-PAC 6 Clicks Basic Mobility (PT)  -               User Key  (r) = Recorded By, (t) = Taken By, (c) = Cosigned By      Initials Name Provider Type     Daniel Garcia, PT Physical Therapist    Deborah Barber RN Registered Nurse                                 Physical Therapy Education       Title: PT OT SLP Therapies (In Progress)       Topic: Physical Therapy (Done)       Point: Mobility training (Done)       Learning Progress Summary             Patient Acceptance, E,D, DU,VU by  at 8/4/2024 1457    Comment: benefits of PT and POC, call for A OOB                         Point: Precautions (Done)       Learning Progress Summary             Patient Acceptance, E,D, DU,VU by  at 8/4/2024 1457    Comment: benefits of PT and POC, call for A OOB                                         User Key       Initials Effective Dates Name Provider Type Discipline     02/03/23 -  Daniel Garcia, PT Physical Therapist PT                  PT Recommendation and Plan  Planned Therapy Interventions (PT): balance training, gait training, home exercise program, strengthening, patient/family education, transfer training  Plan of Care  Reviewed With: patient  Outcome Evaluation: PT IE complete.  A&Ox4.  C/o 8/10.  Pt is a high fall risk.  Today he is SBA bed mobility.  Min A sit<>stand.  Ambulated 20ft min A x1 reaching for wall to balance.  Wearing surgical shoe on L foot.  Pt would benefit from a RW for now.  Does not use AD at baseline.  PT to see for gait safety, balance, and strengthening.  Recommend SNF at [place].  Thank you for referral.     Time Calculation:         PT Charges       Row Name 08/04/24 1457             Time Calculation    Start Time 1359  -      Stop Time 1440  -      Time Calculation (min) 41 min  -      PT Received On 08/04/24  -      PT Goal Re-Cert Due Date 08/14/24  -         Untimed Charges    PT Eval/Re-eval Minutes 41  -         Total Minutes    Untimed Charges Total Minutes 41  -       Total Minutes 41  -                User Key  (r) = Recorded By, (t) = Taken By, (c) = Cosigned By      Initials Name Provider Type     Daniel Garcia, PT Physical Therapist                  Therapy Charges for Today       Code Description Service Date Service Provider Modifiers Qty    [id number]  PT EVAL MOD COMPLEXITY 3 8/4/2024 Daniel Garcia, PT GP 1            PT G-Codes  Outcome Measure Options: AM-PAC 6 Clicks Basic Mobility (PT)  AM-PAC 6 Clicks Score (PT): 17  PT Discharge Summary  Anticipated Discharge Disposition (PT): skilled nursing facility    Daniel Garcia PT  8/4/2024

## 2024-08-04 NOTE — PLAN OF CARE
Problem: Adult Inpatient Plan of Care  Goal: Plan of Care Review  Recent Flowsheet Documentation  Taken 8/4/2024 1359 by Daniel Garcia, PT  Plan of Care Reviewed With: patient  Outcome Evaluation: PT IE complete.  A&Ox4.  C/o 8/10.  Pt is a high fall risk.  Today he is SBA bed mobility.  Min A sit<>stand.  Ambulated 20ft min A x1 reaching for wall to balance.  Wearing surgical shoe on L foot.  Pt would benefit from a RW for now.  Does not use AD at baseline.  PT to see for gait safety, balance, and strengthening.  Recommend SNF at [place].  Thank you for referral.   Goal Outcome Evaluation:  Plan of Care Reviewed With: patient           Outcome Evaluation: PT IE complete.  A&Ox4.  C/o 8/10.  Pt is a high fall risk.  Today he is SBA bed mobility.  Min A sit<>stand.  Ambulated 20ft min A x1 reaching for wall to balance.  Wearing surgical shoe on L foot.  Pt would benefit from a RW for now.  Does not use AD at baseline.  PT to see for gait safety, balance, and strengthening.  Recommend SNF at [place].  Thank you for referral.      Anticipated Discharge Disposition (PT): skilled nursing facility

## 2024-08-04 NOTE — PLAN OF CARE
Goal Outcome Evaluation:  Plan of Care Reviewed With: patient        Progress: no change  Outcome Evaluation: No c/o pain so far this shift, cont. IVF and IV ABX, accuchecks and SSI given per orders, chase diet

## 2024-08-04 NOTE — THERAPY EVALUATION
Acute Care - Occupational Therapy Initial Evaluation  [facility]     Patient Name: Erick Luong  : 1955  MRN: [id number]  Today's Date: 2024     Date of Referral to OT: 24       Admit Date: 2024       ICD-10-CM ICD-9-CM   1. Diabetic foot infection  E11.628 250.80    L08.9 686.9   2. Osteomyelitis of foot, unspecified laterality, unspecified type  M86.9 730.27   3. Diabetic ketoacidosis without coma associated with type 2 diabetes mellitus  E11.10 250.12   4. Elevated troponin  R79.89 790.6   5. Atrial fibrillation with RVR  I48.91 427.31   6. Confusion  R41.0 298.9   7. Impaired mobility [Z74.09]  Z74.09 799.89     Patient Active Problem List   Diagnosis    Obesity, unspecified obesity severity, unspecified obesity type    Essential hypertension    Type 2 diabetes mellitus with hyperglycemia, with long-term current use of insulin    Nonsmoker    Anemia due to chronic kidney disease    Class 3 severe obesity due to excess calories with body mass index (BMI) of 40.0 to 44.9 in adult    Anasarca    Sleep apnea with use of continuous positive airway pressure (CPAP)    Medically noncompliant    Diabetic ulcer of left foot associated with type 2 diabetes mellitus    Diabetic polyneuropathy associated with type 2 diabetes mellitus    Spinal stenosis in cervical region    Degeneration of cervical intervertebral disc    Cervical radiculopathy    Degeneration of lumbar or lumbosacral intervertebral disc    Cervical myelopathy    Bilateral carpal tunnel syndrome    CAD (coronary artery disease)    GERD without esophagitis    BPH without obstruction/lower urinary tract symptoms    Stage 3b chronic kidney disease    Chronic diastolic heart failure    Type 2 myocardial infarction due to heart failure    Left carpal tunnel syndrome    Syncope and collapse, recurrent episodes    Poorly-controlled hypertension    Rhinovirus    Peripheral vascular disease    Chronic kidney disease (CKD), stage IV (severe)     Diabetic foot infection    Sepsis    Epigastric pain    Chronic heart failure with preserved ejection fraction (HFpEF)    Sepsis due to methicillin resistant Staphylococcus aureus (MRSA) with encephalopathy without septic shock    Slow transit constipation    CHF exacerbation    CHF (congestive heart failure), NYHA class I, acute on chronic, combined    Rectal bleeding    Acute on chronic heart failure with preserved ejection fraction (HFpEF)    DKA, type 2, not at goal    Atrial fibrillation    E. coli UTI, ESBL     Past Medical History:   Diagnosis Date    Arthritis     Autonomic disease     CAD (coronary artery disease) 02/06/2017    Cervical radiculopathy 09/16/2021    Chronic constipation with acute exaccerbation 05/10/2021    Coronary artery disease     Degeneration of cervical intervertebral disc 08/11/2021    Diabetes mellitus     Diabetic foot ulcer 08/31/2020    Diabetic polyneuropathy associated with type 2 diabetes mellitus 01/18/2021    Elevated cholesterol     Gastroesophageal reflux disease 05/13/2019    Headache     HTN (hypertension), benign 05/03/2017    Hyperlipidemia     Hypertension     Mixed hyperlipidemia 02/07/2017    Multiple lung nodules 01/26/2020    5mm, 9 mm RLL identified 1/2020, not present 10/2019.    Myocardial infarction     Osteomyelitis 01/22/2020    Osteomyelitis of fifth toe of right foot 10/07/2019    Pancreatitis     Persistent insomnia 01/20/2020    Renal disorder     Sleep apnea 02/06/2017    Sleep apnea with use of continuous positive airway pressure (CPAP)     NON-COMPLIANT    Slow transit constipation 01/16/2019    Spinal stenosis in cervical region 09/16/2021    Vitamin D deficiency 03/02/2021     Past Surgical History:   Procedure Laterality Date    ABDOMINAL SURGERY      AMPUTATION FOOT / TOE Left 10/2021    5th digit     ANTERIOR CERVICAL DISCECTOMY W/ FUSION N/A 08/05/2022    Procedure: CERVICAL DISCECTOMY ANTERIOR WITH FUSION C5-6 with possible plating of C5-7 with  neuromonitoring and 1 c-arm;  Surgeon: Karel Soliz MD;  Location: [facility] OR;  Service: Neurosurgery;  Laterality: N/A;    APPENDECTOMY      BACK SURGERY      CARDIAC CATHETERIZATION Left 02/08/2021    Procedure: Left Heart Cath w poss intervention left anatomical snuff box acess;  Surgeon: Omkar Charles DO;  Location: [facility] CATH INVASIVE LOCATION;  Service: Cardiology;  Laterality: Left;    CARDIAC SURGERY      CATARACT EXTRACTION      CERVICAL SPINE SURGERY      COLONOSCOPY N/A 01/31/2017    Normal exam repeat in 5 years    COLONOSCOPY N/A 02/11/2019    Mild acute inflammation    COLONOSCOPY N/A 04/07/2024    2 areas at 10 and 20 cm with friability ulceration 2 clips placed at 20 cm and 4 clips at 10 cm poor prep normal mucosa,mild eroisions and ulcerations in visible vessels    COLONOSCOPY N/A 7/1/2024    Procedure: COLONOSCOPY WITH ANESTHESIA;  Surgeon: Arsalan Lorenzo DO;  Location: [facility] ENDOSCOPY;  Service: Gastroenterology;  Laterality: N/A;  pre op constipation/diarrhea  post poor prep  pcp Del Shetty    COLONOSCOPY N/A 7/2/2024    Procedure: COLONOSCOPY WITH ANESTHESIA;  Surgeon: Agapito Christopher MD;  Location: [facility] ENDOSCOPY;  Service: Gastroenterology;  Laterality: N/A;  pre rectal bleeding  post poor prep  pcp Del Shetty MD    COLONOSCOPY W/ POLYPECTOMY  03/04/2014    Hyperplastic polyp    CORONARY ARTERY BYPASS GRAFT  10/2015    ENDOSCOPY  04/13/2011    Gastritis with hemorrhage    ENDOSCOPY N/A 05/05/2017    Normal exam    ENDOSCOPY N/A 02/11/2019    Gastritis    ENDOSCOPY N/A 09/01/2020    Non-erosive gastritis with hemorrhage    ENDOSCOPY N/A 02/10/2021    Esophagitis    ENDOSCOPY N/A 04/11/2024    There were esophageal mucosal changes suspicious for short-segment Low's esophagus present in the distal esophagus. The maximum longitudinal extent of these mucosal changes was 2 cm in length. Mucosa was biopsied with a cold forceps for histologyDistal esophagus,  biopsies: Mild chronic active esophagogastritis. No evidence of intestinal metaplasia, dysplasia. Antrum, bx, Mild chronic gastritis    FOOT SURGERY Left     INCISION AND DRAINAGE OF WOUND Left 09/2007    spider bite         OT ASSESSMENT FLOWSHEET (Last 12 Hours)       OT Evaluation and Treatment       Row Name 08/04/24 1415                   OT Time and Intention    Subjective Information complains of;weakness;fatigue;pain;nausea/vomiting  -EC        Document Type evaluation  -EC        Mode of Treatment occupational therapy;co-treatment  -EC           General Information    Patient Profile Reviewed yes  -EC        Prior Level of Function independent:;all household mobility;feeding;grooming;dressing;bathing  -EC        Pertinent History of Current Functional Problem found wandering at home with hyperglycemia, uncontrolled DM with peripheral neuropathy, BLE edema & L heel wound PMH: a-fib, CAD  -EC        Existing Precautions/Restrictions fall;other (see comments)  wound on L heel, surgical shoe  -EC        Barriers to Rehab medically complex;physical barrier  -EC           Living Environment    Current Living Arrangements home  tub shower  -EC        People in Home spouse;child(julia), dependent  -EC           Home Main Entrance    Number of Stairs, Main Entrance one;none  -EC           Stairs Within Home, Primary    Number of Stairs, Within Home, Primary none  -EC           Pain Assessment    Pretreatment Pain Rating 8/10  -EC        Posttreatment Pain Rating 8/10  -EC        Pain Location generalized  -EC        Pain Location - knee  -EC           Cognition    Orientation Status (Cognition) oriented x 4  -EC           Range of Motion Comprehensive    Comment, General Range of Motion pain with B shoulder AROM past 60%, all other WFL  -EC           Strength Comprehensive (MMT)    Comment, General Manual Muscle Testing (MMT) Assessment B shoulders 2+/5, distal BUE 4/5  -EC           Sensory    Additional  Documentation Sensory Assessment (Somatosensory) (Group)  -EC           Sensory Assessment (Somatosensory)    Sensory Assessment (Somatosensory) other (see comments)  n/t in B hands & feet  -EC           Activities of Daily Living    BADL Assessment/Intervention lower body dressing  -EC           Lower Body Dressing Assessment/Training    [place] Level (Lower Body Dressing) lower body dressing skills;maximum assist (25% patient effort)  -EC        Position (Lower Body Dressing) edge of bed sitting  -EC           BADL Safety/Performance    Impairments, BADL Safety/Performance balance;endurance/activity tolerance;strength;pain  -EC           Bed Mobility    Bed Mobility supine-sit  -EC        Supine-Sit [place] (Bed Mobility) standby assist  -EC           Functional Mobility    Functional Mobility- Ind. Level contact guard assist  -EC        Functional Mobility- Device other (see comments)  HHAx1  -EC        Functional Mobility- Comment bed to BR & chair  -EC           Transfer Assessment/Treatment    Transfers sit-stand transfer;stand-sit transfer  -EC           Sit-Stand Transfer    Sit-Stand [place] (Transfers) minimum assist (75% patient effort)  -EC           Stand-Sit Transfer    Stand-Sit [place] (Transfers) contact guard  -EC           Safety Issues, Functional Mobility    Safety Issues Affecting Function (Mobility) ability to follow commands  -EC        Impairments Affecting Function (Mobility) balance;endurance/activity tolerance;pain;strength;shortness of breath;sensation/sensory awareness  -EC           Balance    Balance Assessment sitting static balance;sitting dynamic balance;standing static balance;standing dynamic balance  -EC        Static Sitting Balance independent  -EC        Dynamic Sitting Balance standby assist  -EC        Position, Sitting Balance unsupported;sitting edge of bed  -EC        Static Standing Balance contact guard  -EC        Dynamic Standing Balance contact  guard  -EC        Comment, Balance unsteady d/t L foot wound  -EC           Wound 08/22/23 0140 Left posterior plantar    Wound - Properties Group Placement Date: 08/22/23  -AM Placement Time: 0140  -AM Present on Original Admission: Y  -AM Side: Left  -AM Orientation: posterior  -AM Location: plantar  -AM    Retired Wound - Properties Group Placement Date: 08/22/23  -AM Placement Time: 0140  -AM Present on Original Admission: Y  -AM Side: Left  -AM Orientation: posterior  -AM Location: plantar  -AM    Retired Wound - Properties Group Date first assessed: 08/22/23  -AM Time first assessed: 0140  -AM Present on Original Admission: Y  -AM Side: Left  -AM Location: plantar  -AM       Wound Left lateral foot Diabetic Ulcer    Wound - Properties Group Side: Left  -MH Orientation: lateral  -MH Location: foot  -JACIEL, 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;diabetic foot ulcer/gangrene  Primary Wound Type: Diabetic ulc  -JACIEL Wound Outcome: Amputation  -JACIEL Stage, Pressure Injury : other (see comments)  -JACIEL, full thickness starting 10/20/21     Retired Wound - Properties Group Side: Left  -MH Orientation: lateral  -MH Location: foot  -JACIEL, 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;diabetic foot ulcer/gangrene  Primary Wound Type: Diabetic ulc  -JACIEL Stage, Pressure Injury : other (see comments)  -JACIEL, full thickness starting 10/20/21  Wound Outcome: Amputation  -JACIEL    Retired Wound - Properties Group Side: Left  -MH Location: foot  -JACIEL, 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;diabetic foot ulcer/gangrene  Primary Wound Type: Diabetic ulc  -JACIEL Wound Outcome: Amputation  -JACIEL       Wound 06/15/23 0102 Left anterior plantar    Wound - Properties Group Placement Date: 06/15/23  -SM Placement Time: 0102 -SM Side: Left  -SM Orientation: anterior  -SM Location: plantar  -SM    Retired Wound - Properties Group Placement Date: 06/15/23  -SM Placement Time: 0102 -SM Side: Left  -SM Orientation: anterior  -SM Location: plantar  -SM    Retired Wound - Properties Group Date first assessed:  06/15/23  -SM Time first assessed: 0102 -SM Side: Left  -SM Location: plantar  -SM       Wound 04/04/24 2100 Left posterior wrist Skin Tear    Wound - Properties Group Placement Date: 04/04/24  -NR Placement Time: 2100 -NR Present on Original Admission: N  -NR Side: Left  -NR Orientation: posterior  -NR Location: wrist  -NR Primary Wound Type: Skin tear  -NR Additional Comments: likely caused by wristband. Band removed, site cleaned with alcohol swab, wraped in gauze  -NR    Retired Wound - Properties Group Placement Date: 04/04/24  -NR Placement Time: 2100  -NR Present on Original Admission: N  -NR Side: Left  -NR Orientation: posterior  -NR Location: wrist  -NR Primary Wound Type: Skin tear  -NR Additional Comments: likely caused by wristband. Band removed, site cleaned with alcohol swab, wraped in gauze  -NR    Retired Wound - Properties Group Date first assessed: 04/04/24  -NR Time first assessed: 2100  -NR Present on Original Admission: N  -NR Side: Left  -NR Location: wrist  -NR Primary Wound Type: Skin tear  -NR Additional Comments: likely caused by wristband. Band removed, site cleaned with alcohol swab, wraped in gauze  -NR       Plan of Care Review    Plan of Care Reviewed With patient  -EC        Progress no change  -EC        Outcome Evaluation OT eval complete.  Pt A&Ox4 c/o 8/10 pain, weakness, SOA, & nausea. He performs bed mob with SBA, indep with sitting balance EOB. He requires a surgical shoe on his L foot d/t a wound & requires maxA to don. Pain with B shoulder ROM past 60%, demos weakness throughout all extremities. Pt stood & amb short distance around the room with HHAx1. He is a high fall risk d/t decreased sensation in his BLEs. He demos significantly decreased act chase which is limiting his ability to carry out ADLs. Skilled OT indicated to address his deficits and maximize his indep & safety. Rec d/c to SNF for cont rehab vs home w HH  -EC           Positioning and Restraints     Pre-Treatment Position in bed  -EC        Post Treatment Position chair  -EC        In Chair reclined;call light within reach;encouraged to call for assist;legs elevated  -EC           Therapy Assessment/Plan (OT)    Date of Referral to OT 08/03/24  -EC        OT Diagnosis decreased ADLs  -EC        Rehab Potential (OT) good, to achieve stated therapy goals  -EC        Criteria for Skilled Therapeutic Interventions Met (OT) yes;meets criteria;skilled treatment is necessary  -EC        Therapy Frequency (OT) 5 times/wk  -EC        Predicted Duration of Therapy Intervention (OT) 10 days  -EC        Planned Therapy Interventions (OT) activity tolerance training;BADL retraining;functional balance retraining;occupation/activity based interventions;patient/caregiver education/training;strengthening exercise;transfer/mobility retraining  -EC           OT Goals    Transfer Goal Selection (OT) transfer, OT goal 1  -EC        Dressing Goal Selection (OT) dressing, OT goal 1  -EC        Strength Goal Selection (OT) strength, OT goal 1  -EC           Transfer Goal 1 (OT)    Activity/Assistive Device (Transfer Goal 1, OT) sit-to-stand/stand-to-sit;toilet;shower chair;tub  -EC        [place] Level/Cues Needed (Transfer Goal 1, OT) supervision required  -EC        Time Frame (Transfer Goal 1, OT) long term goal (LTG)  -EC        Progress/Outcome (Transfer Goal 1, OT) new goal  -EC           Dressing Goal 1 (OT)    Activity/Device (Dressing Goal 1, OT) dressing skills, all  -EC        [place]/Cues Needed (Dressing Goal 1, OT) minimum assist (75% or more patient effort)  -EC        Time Frame (Dressing Goal 1, OT) long term goal (LTG)  -EC        Strategies/Barriers (Dressing Goal 1, OT) AE PRN  -EC        Progress/Outcome (Dressing Goal 1, OT) new goal  -EC           Strength Goal 1 (OT)    Strength Goal 1 (OT) Pt will increase BUE strength to 4+/5 for increased safety & indep with ADLs & fxnal transfers  -EC        Time  Frame (Strength Goal 1, OT) long term goal (LTG)  -EC        Progress/Outcome (Strength Goal 1, OT) new goal  -EC                  User Key  (r) = Recorded By, (t) = Taken By, (c) = Cosigned By      Initials Name Effective Dates    JACIEL Yuliana Coello RN 06/16/21 - 02/13/22    MH Haase, Mallory L, RN 06/16/21 - 07/14/22    Faviola Kunz RN 06/16/21 -     Michelle Diaz RN 09/22/22 -     NR Reuben Holbrook RN 02/14/23 -     EC Matilda Bryant OTR/L 10/13/23 -                      Occupational Therapy Education       Title: PT OT SLP Therapies (In Progress)       Topic: Occupational Therapy (In Progress)       Point: ADL training (Done)       Description:   Instruct learner(s) on proper safety adaptation and remediation techniques during self care or transfers.   Instruct in proper use of assistive devices.                  Learning Progress Summary             Patient Acceptance, E, VU,NR by EC at 8/4/2024 1426                         Point: Home exercise program (Not Started)       Description:   Instruct learner(s) on appropriate technique for monitoring, assisting and/or progressing therapeutic exercises/activities.                  Learner Progress:  Not documented in this visit.              Point: Precautions (Done)       Description:   Instruct learner(s) on prescribed precautions during self-care and functional transfers.                  Learning Progress Summary             Patient Acceptance, E, VU,NR by EC at 8/4/2024 1426                         Point: Body mechanics (Done)       Description:   Instruct learner(s) on proper positioning and spine alignment during self-care, functional mobility activities and/or exercises.                  Learning Progress Summary             Patient Acceptance, E, VU,NR by EC at 8/4/2024 1426                                         User Key       Initials Effective Dates Name Provider Type Discipline    EC 10/13/23 -  Matilda Bryant, OTR/L  Occupational Therapist OT                      OT Recommendation and Plan  Planned Therapy Interventions (OT): activity tolerance training, BADL retraining, functional balance retraining, occupation/activity based interventions, patient/caregiver education/training, strengthening exercise, transfer/mobility retraining  Therapy Frequency (OT): 5 times/wk  Plan of Care Review  Plan of Care Reviewed With: patient  Progress: no change  Outcome Evaluation: OT eval complete.  Pt A&Ox4 c/o 8/10 pain, weakness, SOA, & nausea. He performs bed mob with SBA, indep with sitting balance EOB. He requires a surgical shoe on his L foot d/t a wound & requires maxA to don. Pain with B shoulder ROM past 60%, demos weakness throughout all extremities. Pt stood & amb short distance around the room with HHAx1. He is a high fall risk d/t decreased sensation in his BLEs. He demos significantly decreased act chase which is limiting his ability to carry out ADLs. Skilled OT indicated to address his deficits and maximize his indep & safety. Rec d/c to SNF for cont rehab vs home w   Plan of Care Reviewed With: patient  Outcome Evaluation: OT eval complete.  Pt A&Ox4 c/o 8/10 pain, weakness, SOA, & nausea. He performs bed mob with SBA, indep with sitting balance EOB. He requires a surgical shoe on his L foot d/t a wound & requires maxA to don. Pain with B shoulder ROM past 60%, demos weakness throughout all extremities. Pt stood & amb short distance around the room with HHAx1. He is a high fall risk d/t decreased sensation in his BLEs. He demos significantly decreased act chase which is limiting his ability to carry out ADLs. Skilled OT indicated to address his deficits and maximize his indep & safety. Rec d/c to SNF for cont rehab vs home w      Outcome Measures       Row Name 08/04/24 1500             How much help from another is currently needed...    Putting on and taking off regular lower body clothing? 1  -EC      Bathing (including  washing, rinsing, and drying) 2  -EC      Toileting (which includes using toilet bed pan or urinal) 2  -EC      Putting on and taking off regular upper body clothing 3  -EC      Taking care of personal grooming (such as brushing teeth) 3  -EC      Eating meals 4  -EC      AM-PAC 6 Clicks Score (OT) 15  -EC         Functional Assessment    Outcome Measure Options AM-PAC 6 Clicks Daily Activity (OT)  -EC                User Key  (r) = Recorded By, (t) = Taken By, (c) = Cosigned By      Initials Name Provider Type    EC Matilda Bryant, OTR/L Occupational Therapist                    Time Calculation:              ALEXANDER Goldstein/EDVIN  8/4/2024

## 2024-08-04 NOTE — PROGRESS NOTES
"Pharmacy Dosing Service  Pharmacokinetics  Vancomycin Follow-up Evaluation    Assessment/Action/Plan:  Current Order: Vancomycin 1000 mg IVPB every 24 hours  Current end date:8/5/24 @ 2130  Levels: Trough on 8/3/24 @ 1956 = 10.00  Additional antimicrobial agent(s): None at this time    Dose adjusted from 1000 mg to 750 mg IVPB Q24H. Pharmacy will continue to follow daily and adjust dose accordingly.     Subjective:  Erick Luong is a 69 y.o. male currently on Vancomycin 750 mg IV every 24 hours for the treatment of SSTI, day 4 of 5 of treatment.    AUC Model Data:  Regimen: 750 mg IV every 12 hours.  Start time: 20:50 on 08/03/2024  Exposure target: AUC24 (range)400-600 mg/L.hr   AUC24,ss: 591 mg/L.hr  PAUC*: 95 %  Ctrough,ss: 18.1 mg/L  Pconc*: 24 %  Tox.: 14 %    Objective:  Ht: 182.9 cm (72\"); Wt: 126 kg (276 lb 12.8 oz)  Estimated Creatinine Clearance: 53.3 mL/min (A) (by C-G formula based on SCr of 1.82 mg/dL (H)).   Creatinine   Date Value Ref Range Status   08/03/2024 1.82 (H) 0.76 - 1.27 mg/dL Final   08/02/2024 1.98 (H) 0.76 - 1.27 mg/dL Final   08/01/2024 2.28 (H) 0.76 - 1.27 mg/dL Final   01/03/2022 2.6 (H) 0.5 - 1.2 mg/dL Final   07/21/2021 2.30 (H) 0.60 - 1.30 mg/dL Final     Comment:     Serial Number: [id number]:  [id number]      Lab Results   Component Value Date    WBC 5.75 08/03/2024    WBC 9.76 08/02/2024    WBC 12.83 (H) 08/01/2024         Lab Results   Component Value Date    VANCOTROUGH 10.00 08/03/2024    VANCORANDOM 25.60 10/29/2019       Culture Results:  Microbiology Results (last 10 days)       Procedure Component Value - Date/Time    Wound Culture - Drainage, Foot, Left [[id number]]  (Abnormal) Collected: 08/02/24 1338    Lab Status: Preliminary result Specimen: Drainage from Foot, Left Updated: 08/03/24 0906     Wound Culture Light growth (2+) Streptococcus agalactiae (Group B)     Comment:   This organism is considered to be universally susceptible to penicillin.  No further " antibiotic testing will be performed. If Clindamycin or Erythromycin is the drug of choice, notify the laboratory within 7 days to request susceptibility testing.        Gram Stain Rare (1+) WBCs seen      No organisms seen    Wound Culture - Wound, Foot, Left [[id number]]  (Abnormal) Collected: 08/02/24 1032    Lab Status: Preliminary result Specimen: Wound from Foot, Left Updated: 08/03/24 0949     Wound Culture Light growth (2+) Streptococcus agalactiae (Group B)     Comment:   This organism is considered to be universally susceptible to penicillin.  No further antibiotic testing will be performed. If Clindamycin or Erythromycin is the drug of choice, notify the laboratory within 7 days to request susceptibility testing.        Gram Stain Few (2+) WBCs seen      Rare (1+) Gram positive cocci    Eosinophil Smear - Urine, Urine, Clean Catch [[id number]]  (Normal) Collected: 08/01/24 1547    Lab Status: Final result Specimen: Urine, Clean Catch Updated: 08/02/24 0149     Eosinophil Smear 0 % EOS/100 Cells     MRSA Screen, PCR (Inpatient) - Swab, Nares [[id number]]  (Normal) Collected: 08/01/24 0206    Lab Status: Final result Specimen: Swab from Nares Updated: 08/01/24 0346     MRSA PCR No MRSA Detected    Narrative:      The negative predictive value of this diagnostic test is high and should only be used to consider de-escalating anti-MRSA therapy. A positive result may indicate colonization with MRSA and must be correlated clinically.    Urine Culture - Urine, Straight Cath [[id number]]  (Abnormal) Collected: 07/31/24 1851    Lab Status: Preliminary result Specimen: Urine from Straight Cath Updated: 08/03/24 1305     Urine Culture >100,000 CFU/mL Escherichia coli     Comment: ESBL confirmation in progress. 8.3       Narrative:      Colonization of the urinary tract without infection is common. Treatment is discouraged unless the patient is symptomatic, pregnant, or undergoing an invasive urologic procedure.    Blood  Culture - Blood, Hand, Left [[id number]]  (Normal) Collected: 07/31/24 1849    Lab Status: Preliminary result Specimen: Blood from Hand, Left Updated: 08/03/24 1916     Blood Culture No growth at 3 days    Blood Culture - Blood, Arm, Left [[id number]]  (Abnormal) Collected: 07/31/24 1849    Lab Status: Final result Specimen: Blood from Arm, Left Updated: 08/02/24 0545     Blood Culture Staphylococcus, coagulase negative     Isolated from Aerobic Bottle     Gram Stain Aerobic Bottle Gram positive cocci in clusters    Narrative:      Probable contaminant requires clinical correlation, susceptibility not performed unless requested by physician.      Blood Culture ID, PCR - Blood, Arm, Left [[id number]]  (Abnormal) Collected: 07/31/24 1849    Lab Status: Final result Specimen: Blood from Arm, Left Updated: 08/01/24 1117     BCID, PCR Staph spp, not aureus or lugdunensis. Identification by BCID2 PCR.     BOTTLE TYPE Aerobic Bottle            Scarlett Post, Robert   08/03/24 20:31 CDT

## 2024-08-04 NOTE — PLAN OF CARE
Goal Outcome Evaluation:  Plan of Care Reviewed With: patient        Progress: no change  Outcome Evaluation: OT joel complete.  Pt A&Ox4 c/o 8/10 pain, weakness, SOA, & nausea. He performs bed mob with SBA, indep with sitting balance EOB. He requires a surgical shoe on his L foot d/t a wound & requires maxA to don. Pain with B shoulder ROM past 60%, demos weakness throughout all extremities. Pt stood & amb short distance around the room with HHAx1. He is a high fall risk d/t decreased sensation in his BLEs. He demos significantly decreased act chase which is limiting his ability to carry out ADLs. Skilled OT indicated to address his deficits and maximize his indep & safety. Rec d/c to SNF for cont rehab vs home w HH

## 2024-08-04 NOTE — PROGRESS NOTES
Nephrology ([facility]) Progress Note      Patient:  Erick Luong  YOB: 1955  Date of Service: 8/4/2024  MRN: [id number]   Acct: [id number]   Primary Care Physician: Del Shetty MD  Advance Directive:   Code Status and Medical Interventions: CPR (Attempt to Resuscitate); Full Support   Ordered at: 08/01/24 0053     Level Of Support Discussed With:    Patient     Code Status (Patient has no pulse and is not breathing):    CPR (Attempt to Resuscitate)     Medical Interventions (Patient has pulse or is breathing):    Full Support     Admit Date: 7/31/2024       Hospital Day: 4  Referring Provider: Abebe Garzon DO      Patient personally seen and examined.  Complete chart including Consults, Notes, Operative Reports, Labs, Cardiology, and Radiology studies reviewed as able.    Chief complaint: Abnormal labs.    Subjective:  Erick Luong is a 69 y.o. male for whom we were consulted for evaluation and treatment of acute kidney injury. Baseline chronic kidney disease stage 3b. Baseline creatinine approximately 1.5-1.8. Follows in our office.  History of poorly controlled type 2 diabetes, hypertension, coronary artery disease, diabetic foot ulcer, obesity.  On 7/31 patient was found wandering at home, confused. Brought to ER for evaluation. Has a nonhealing left foot diabetic ulcer with serosanguineous drainage. Blood glucose level was >700 with A1c >12%. Initial creatinine was 2.67 and has steadily improved since he was admitted. Nephrology consulted on 8/01. Hospital course remarkable for improving renal function and improved blood glucose levels.     He was initially admitted to CCU now transferred to regular floor.  He feels weak today patient is sitting up and eating lunch    Allergies:  Cefepime, Bactrim [sulfamethoxazole-trimethoprim], Vancomycin, Zolpidem, and Metronidazole    Home Meds:  Medications Prior to Admission   Medication Sig Dispense Refill Last Dose     ascorbic acid (VITAMIN C) 1000 MG tablet Take 1 tablet by mouth Daily. 30 tablet 3     bumetanide (BUMEX) 1 MG tablet Take 1 tablet by mouth 2 (Two) Times a Day for 30 days. 60 tablet 0     busPIRone (BUSPAR) 10 MG tablet Take 1 tablet by mouth 2 (Two) Times a Day.       calcitriol (ROCALTROL) 0.5 MCG capsule Take 1 capsule by mouth Daily. 90 capsule 4     carvedilol (COREG) 3.125 MG tablet Take 1 tablet twice a day by oral route. 60 tablet 4     donepezil (ARICEPT) 10 MG tablet Take 1 tablet by mouth Daily. 90 tablet 1     DULoxetine (CYMBALTA) 60 MG capsule Take 1 capsule by mouth Daily. 90 capsule 4     famotidine (PEPCID) 20 MG tablet Take 1 tablet twice a day by oral route. 180 tablet 4     Insulin Glargine (Lantus SoloStar) 100 UNIT/ML injection pen Inject 20 Units under the skin into the appropriate area as directed every night at bedtime. 15 mL 3     Insulin Regular Human, Conc, (HumuLIN R) 500 UNIT/ML solution pen-injector CONCENTRATED injection Inject 40 Units under the skin into the appropriate area as directed 2 (Two) Times a Day Before Meals. Inject 40 units under the skin in the the appropriate area with regular meals AND 40 units with large meals.       Iron-Vitamin C (Vitron-C)  MG tablet Take 1 tablet twice a day by oral route. 60 tablet 2     levocetirizine (XYZAL) 5 MG tablet Take 1 tablet by mouth every day at bedtime. 30 tablet 2     melatonin 3 MG tablet Take 2 tablets by mouth At Night As Needed for Sleep. 30 tablet 0     oxyCODONE (ROXICODONE) 10 MG tablet Take 1 tablet by mouth 2 (Two) Times a Day As Needed. Must last 30 days per md. 55 tablet 0     pantoprazole (PROTONIX) 40 MG EC tablet Take 1 tablet by mouth Every 12 (Twelve) Hours. 180 tablet 4     pregabalin (LYRICA) 100 MG capsule Take 1 capsule by mouth Daily.       pregabalin (LYRICA) 100 MG capsule Take 2 capsules by mouth every night at bedtime.       rosuvastatin (CRESTOR) 10 MG tablet Take 1 tablet by mouth Every Night.  30 tablet 2     sucralfate (CARAFATE) 1 g tablet Take 1 tablet by mouth 4 (Four) Times a Day before meals. 360 tablet 1     Diclofenac Sodium (VOLTAREN) 1 % gel gel Apply 2 g topically to the appropriate area as directed 4 (Four) Times a Day As Needed. 300 g 11     nitroglycerin (NITROSTAT) 0.4 MG SL tablet Place 1 tablet under the tongue Every 5 (Five) Minutes As Needed for Chest Pain. Take no more than 3 doses in 15 minutes.       polyethylene glycol (MIRALAX) 17 GM/SCOOP powder Take 17 g by mouth Daily As Needed (constipation).       sennosides-docusate (PERICOLACE) 8.6-50 MG per tablet Take 1 tablet by mouth Every Night. Obtain OTC          Medicines:  Current Facility-Administered Medications   Medication Dose Route Frequency Provider Last Rate Last Admin    ascorbic acid (VITAMIN C) tablet 1,000 mg  1,000 mg Oral Daily Reuben Garza APRN   1,000 mg at 08/04/24 0819    sennosides-docusate (PERICOLACE) 8.6-50 MG per tablet 2 tablet  2 tablet Oral BID Lamine Figueroa APRN   2 tablet at 08/04/24 0819    And    polyethylene glycol (MIRALAX) packet 17 g  17 g Oral Daily PRN Lamine Figueroa APRN        And    bisacodyl (DULCOLAX) EC tablet 5 mg  5 mg Oral Daily PRN Lamine Figueroa APRN        And    bisacodyl (DULCOLAX) suppository 10 mg  10 mg Rectal Daily PRN Lamine Figueroa APRN   10 mg at 08/02/24 0941    calcitriol (ROCALTROL) capsule 0.25 mcg  0.25 mcg Oral Daily Brian Lomas MD   0.25 mcg at 08/04/24 0822    Calcium Replacement - Follow Nurse / BPA Driven Protocol   Does not apply PRN Abebe Garzon DO        dextrose (D50W) (25 g/50 mL) IV injection 10-50 mL  10-50 mL Intravenous Q15 Min PRN Abebe Garzon DO        dextrose (D50W) (25 g/50 mL) IV injection 25 g  25 g Intravenous Q15 Min PRN Lamine Figueroa APRN        dextrose (GLUTOSE) oral gel 15 g  15 g Oral Q15 Min PRN Lamine Figueroa APRN        donepezil (ARICEPT) tablet 10 mg  10 mg Oral Daily Reuben aGrza APRN   10 mg at  08/04/24 0937    DULoxetine (CYMBALTA) DR capsule 60 mg  60 mg Oral Daily Reuebn Garza APRN   60 mg at 08/04/24 0821    Enoxaparin Sodium (LOVENOX) syringe 135 mg  1 mg/kg Subcutaneous Q12H Abebe Garzon DO   135 mg at 08/04/24 0938    ertapenem (INVanz) 1,000 mg in sodium chloride 0.9 % 100 mL MBP  1,000 mg Intravenous Q24H Julia Adrian  mL/hr at 08/04/24 1357 1,000 mg at 08/04/24 1357    famotidine (PEPCID) tablet 20 mg  20 mg Oral Q12H Reuben Garza APRN   20 mg at 08/04/24 0819    ferric gluconate (FERRLECIT) 250 mg in sodium chloride 0.9 % 250 mL IVPB  250 mg Intravenous Daily Brian Lomas  mL/hr at 08/04/24 0937 250 mg at 08/04/24 0937    glucagon (GLUCAGEN) injection 1 mg  1 mg Intramuscular Q15 Min PRN Lamine Figueroa APRN        insulin glargine (LANTUS, SEMGLEE) injection 10 Units  10 Units Subcutaneous Daily Keren Jamison MD   10 Units at 08/04/24 0823    insulin regular (humuLIN R,novoLIN R) injection 3-14 Units  3-14 Units Subcutaneous 4x Daily AC & at Bedtime Keren Jamison MD   3 Units at 08/03/24 2033    Magnesium Standard Dose Replacement - Follow Nurse / BPA Driven Protocol   Does not apply PRN Abebe Garzon DO        melatonin tablet 2.5 mg  2.5 mg Oral Nightly Reuben Garza APRN   2.5 mg at 08/03/24 2033    nitroglycerin (NITROSTAT) SL tablet 0.4 mg  0.4 mg Sublingual Q5 Min PRN Lamine Figueroa APRN        ondansetron (ZOFRAN) injection 4 mg  4 mg Intravenous Q6H PRN Payam Keyes DO   4 mg at 08/04/24 1357    oxyCODONE (ROXICODONE) immediate release tablet 10 mg  10 mg Oral BID Lamine Figueroa APRN   10 mg at 08/04/24 0820    pantoprazole (PROTONIX) EC tablet 40 mg  40 mg Oral Q12H Reuben Garza APRN   40 mg at 08/04/24 0821    Pharmacy to Dose enoxaparin (LOVENOX)   Does not apply Continuous PRN Abebe Garzon DO        Phosphorus Replacement - Follow Nurse / BPA Driven Protocol   Does not apply PRAbebe Koehler DO         polyethylene glycol (MIRALAX) packet 17 g  17 g Oral Daily Reuben Garza APRN   17 g at 08/04/24 0819    Potassium Replacement - Follow Nurse / BPA Driven Protocol   Does not apply PRN Abebe Garzon DO        pregabalin (LYRICA) capsule 100 mg  100 mg Oral Nightly Reuben Garza APRN   100 mg at 08/03/24 2033    pregabalin (LYRICA) capsule 50 mg  50 mg Oral Daily Reuben Garza APRN   50 mg at 08/04/24 0821    rosuvastatin (CRESTOR) tablet 10 mg  10 mg Oral Nightly Reuben Garza APRN   10 mg at 08/03/24 2033    sodium bicarbonate tablet 650 mg  650 mg Oral TID Brian Lomas MD   650 mg at 08/04/24 0820    sodium chloride 0.9 % flush 10 mL  10 mL Intravenous Q12H Abebe Garzon DO   10 mL at 08/04/24 0823    sodium chloride 0.9 % flush 10 mL  10 mL Intravenous PRN Abebe Garzon DO        sodium chloride 0.9 % flush 10 mL  10 mL Intravenous Q12H Lamine Figueroa APRN   10 mL at 08/04/24 0823    sodium chloride 0.9 % flush 10 mL  10 mL Intravenous PRN Lamine Figueroa APRN        sodium chloride 0.9 % infusion 40 mL  40 mL Intravenous PRN Abebe Garzon DO        sodium chloride 0.9 % infusion 40 mL  40 mL Intravenous PRN Lamine Figueroa APRN        sodium chloride 0.9 % infusion  50 mL/hr Intravenous Continuous Brian Lomas MD 50 mL/hr at 08/03/24 2129 50 mL/hr at 08/03/24 2129    sodium hypochlorite (DAKIN'S 1/4 STRENGTH) 0.125 % topical solution 0.125% solution   Topical Q12H Aby High APRN   Given at 08/03/24 2034       Past Medical History:  Past Medical History:   Diagnosis Date    Arthritis     Autonomic disease     CAD (coronary artery disease) 02/06/2017    Cervical radiculopathy 09/16/2021    Chronic constipation with acute exaccerbation 05/10/2021    Coronary artery disease     Degeneration of cervical intervertebral disc 08/11/2021    Diabetes mellitus     Diabetic foot ulcer 08/31/2020    Diabetic polyneuropathy associated with type 2 diabetes mellitus  01/18/2021    Elevated cholesterol     Gastroesophageal reflux disease 05/13/2019    Headache     HTN (hypertension), benign 05/03/2017    Hyperlipidemia     Hypertension     Mixed hyperlipidemia 02/07/2017    Multiple lung nodules 01/26/2020    5mm, 9 mm RLL identified 1/2020, not present 10/2019.    Myocardial infarction     Osteomyelitis 01/22/2020    Osteomyelitis of fifth toe of right foot 10/07/2019    Pancreatitis     Persistent insomnia 01/20/2020    Renal disorder     Sleep apnea 02/06/2017    Sleep apnea with use of continuous positive airway pressure (CPAP)     NON-COMPLIANT    Slow transit constipation 01/16/2019    Spinal stenosis in cervical region 09/16/2021    Vitamin D deficiency 03/02/2021       Past Surgical History:  Past Surgical History:   Procedure Laterality Date    ABDOMINAL SURGERY      AMPUTATION FOOT / TOE Left 10/2021    5th digit     ANTERIOR CERVICAL DISCECTOMY W/ FUSION N/A 08/05/2022    Procedure: CERVICAL DISCECTOMY ANTERIOR WITH FUSION C5-6 with possible plating of C5-7 with neuromonitoring and 1 c-arm;  Surgeon: Karel Soliz MD;  Location:  [facility] OR;  Service: Neurosurgery;  Laterality: N/A;    APPENDECTOMY      BACK SURGERY      CARDIAC CATHETERIZATION Left 02/08/2021    Procedure: Left Heart Cath w poss intervention left anatomical snuff box acess;  Surgeon: Omkar Charles DO;  Location:  [facility] CATH INVASIVE LOCATION;  Service: Cardiology;  Laterality: Left;    CARDIAC SURGERY      CATARACT EXTRACTION      CERVICAL SPINE SURGERY      COLONOSCOPY N/A 01/31/2017    Normal exam repeat in 5 years    COLONOSCOPY N/A 02/11/2019    Mild acute inflammation    COLONOSCOPY N/A 04/07/2024    2 areas at 10 and 20 cm with friability ulceration 2 clips placed at 20 cm and 4 clips at 10 cm poor prep normal mucosa,mild eroisions and ulcerations in visible vessels    COLONOSCOPY N/A 7/1/2024    Procedure: COLONOSCOPY WITH ANESTHESIA;  Surgeon: Arsalan Lorenzo DO;  Location:   [facility] ENDOSCOPY;  Service: Gastroenterology;  Laterality: N/A;  pre op constipation/diarrhea  post poor prep  pcp Del Shetty    COLONOSCOPY N/A 2024    Procedure: COLONOSCOPY WITH ANESTHESIA;  Surgeon: Agapito Christopher MD;  Location: [facility] ENDOSCOPY;  Service: Gastroenterology;  Laterality: N/A;  pre rectal bleeding  post poor prep  pcp Del Shetty MD    COLONOSCOPY W/ POLYPECTOMY  2014    Hyperplastic polyp    CORONARY ARTERY BYPASS GRAFT  10/2015    ENDOSCOPY  2011    Gastritis with hemorrhage    ENDOSCOPY N/A 2017    Normal exam    ENDOSCOPY N/A 2019    Gastritis    ENDOSCOPY N/A 2020    Non-erosive gastritis with hemorrhage    ENDOSCOPY N/A 02/10/2021    Esophagitis    ENDOSCOPY N/A 2024    There were esophageal mucosal changes suspicious for short-segment Low's esophagus present in the distal esophagus. The maximum longitudinal extent of these mucosal changes was 2 cm in length. Mucosa was biopsied with a cold forceps for histologyDistal esophagus, biopsies: Mild chronic active esophagogastritis. No evidence of intestinal metaplasia, dysplasia. Antrum, bx, Mild chronic gastritis    FOOT SURGERY Left     INCISION AND DRAINAGE OF WOUND Left 2007    spider bite       Family History  Family History   Problem Relation Age of Onset    Colon cancer Father     Heart disease Father     Colon cancer Sister     Colon polyps Sister     Alzheimer's disease Mother     Coronary artery disease Sister     Coronary artery disease Sister        Social History  Social History     Socioeconomic History    Marital status:    Tobacco Use    Smoking status: Former     Current packs/day: 0.00     Types: Cigarettes     Quit date:      Years since quittin.6    Smokeless tobacco: Never    Tobacco comments:     smoked in highschool   Vaping Use    Vaping status: Never Used   Substance and Sexual Activity    Alcohol use: No    Drug use: No    Sexual activity: Defer        Review of Systems:  History obtained from chart review and the patient  General ROS: No fever or chills  Respiratory ROS: No cough, shortness of breath, wheezing  Cardiovascular ROS: No chest pain or palpitations  Gastrointestinal ROS: No abdominal pain or melena  Genito-Urinary ROS: No dysuria or hematuria  Psych ROS: No anxiety and depression  14 point ROS reviewed with the patient and negative except as noted above and in the HPI unless unable to obtain.    Objective:  Patient Vitals for the past 24 hrs:   BP Temp Temp src Pulse Resp SpO2 Weight   08/04/24 1231 145/63 97.3 °F (36.3 °C) Oral 81 18 99 % --   08/04/24 0808 123/57 97.7 °F (36.5 °C) Oral 79 18 96 % --   08/04/24 0518 130/66 97.5 °F (36.4 °C) Oral 82 16 96 % 131 kg (289 lb)   08/04/24 0021 126/90 98 °F (36.7 °C) Oral 84 16 100 % --   08/03/24 1916 121/93 97.5 °F (36.4 °C) Oral 83 16 100 % --   08/03/24 1650 141/65 97.7 °F (36.5 °C) Oral 79 16 100 % --       Intake/Output Summary (Last 24 hours) at 8/4/2024 1441  Last data filed at 8/4/2024 1231  Gross per 24 hour   Intake 1810 ml   Output 400 ml   Net 1410 ml     General: awake/alert   HEENT: Normocephalic atraumatic head  Neck: Supple with no JVD or carotid bruits.  Chest:  clear to auscultation bilaterally without respiratory distress  CVS: regular rate and rhythm  Abdominal: soft, nontender, positive bowel sounds  Extremities: Left foot ulcer at the plantar aspect  Skin:  left foot ulcer and warm and dry without rash      Labs:  Results from last 7 days   Lab Units 08/04/24  0615 08/03/24  0605 08/02/24  0558   WBC 10*3/mm3 5.73 5.75 9.76   HEMOGLOBIN g/dL 9.5* 10.3* 10.0*   HEMATOCRIT % 30.5* 33.2* 31.8*   PLATELETS 10*3/mm3 201 215 219         Results from last 7 days   Lab Units 08/04/24  0615 08/03/24  0605 08/02/24  0558 07/31/24  2030 07/31/24  1849   SODIUM mmol/L 145 143 141   < > 132*   POTASSIUM mmol/L 3.9 3.8 3.8   < > 4.5   CHLORIDE mmol/L 111* 110* 108*   < > 97*   CO2 mmol/L  21.0* 19.0* 19.0*   < > 19.0*   BUN mg/dL 24* 31* 37*   < > 55*   CREATININE mg/dL 1.60* 1.82* 1.98*   < > 2.67*   CALCIUM mg/dL 8.5* 8.3* 8.5*   < > 9.0   EGFR mL/min/1.73 46.6* 40.0* 36.1*   < > 25.2*   BILIRUBIN mg/dL 0.3 0.3  --   --  0.6   ALK PHOS U/L 95 88  --   --  101   ALT (SGPT) U/L 9 7  --   --  8   AST (SGOT) U/L 15 12  --   --  11   GLUCOSE mg/dL 97 125* 157*   < > 704*    < > = values in this interval not displayed.       Radiology:   Imaging Results (Last 72 Hours)       ** No results found for the last 72 hours. **            Culture:  Blood Culture   Date Value Ref Range Status   07/31/2024 No growth at 24 hours  Preliminary   07/31/2024 Staphylococcus, coagulase negative (C)  Final         Assessment    Acute kidney injury/improving  Acute tubular necrosis.  Stage IIIb CKD baseline  Type II diabetic nephropathy  Benign essential hypertension  Sepsis related to left diabetic foot ulcer  Anemia of CKD  Morbid abdominal obesity    Plan:  Continue IV antibiotics  Continue to monitor renal function  Intravenous ferric gluconate  Plan was discussed with the patient      Brian Lomas MD  8/4/2024  14:41 CDT

## 2024-08-05 LAB
ALBUMIN SERPL-MCNC: 3.1 G/DL (ref 3.5–5.2)
ALBUMIN/GLOB SERPL: 1.1 G/DL
ALP SERPL-CCNC: 102 U/L (ref 39–117)
ALT SERPL W P-5'-P-CCNC: 9 U/L (ref 1–41)
ANION GAP SERPL CALCULATED.3IONS-SCNC: 11 MMOL/L (ref 5–15)
AST SERPL-CCNC: 16 U/L (ref 1–40)
BACTERIA SPEC AEROBE CULT: NORMAL
BASOPHILS # BLD AUTO: 0.04 10*3/MM3 (ref 0–0.2)
BASOPHILS NFR BLD AUTO: 0.7 % (ref 0–1.5)
BILIRUB SERPL-MCNC: 0.3 MG/DL (ref 0–1.2)
BUN SERPL-MCNC: 20 MG/DL (ref 8–23)
BUN/CREAT SERPL: 13.6 (ref 7–25)
CALCIUM SPEC-SCNC: 8.3 MG/DL (ref 8.6–10.5)
CHLORIDE SERPL-SCNC: 112 MMOL/L (ref 98–107)
CO2 SERPL-SCNC: 21 MMOL/L (ref 22–29)
CREAT SERPL-MCNC: 1.47 MG/DL (ref 0.76–1.27)
DEPRECATED RDW RBC AUTO: 46.2 FL (ref 37–54)
EGFRCR SERPLBLD CKD-EPI 2021: 51.6 ML/MIN/1.73
EOSINOPHIL # BLD AUTO: 0.28 10*3/MM3 (ref 0–0.4)
EOSINOPHIL NFR BLD AUTO: 4.8 % (ref 0.3–6.2)
ERYTHROCYTE [DISTWIDTH] IN BLOOD BY AUTOMATED COUNT: 14.7 % (ref 12.3–15.4)
GLOBULIN UR ELPH-MCNC: 2.7 GM/DL
GLUCOSE BLDC GLUCOMTR-MCNC: 115 MG/DL (ref 70–130)
GLUCOSE BLDC GLUCOMTR-MCNC: 125 MG/DL (ref 70–130)
GLUCOSE BLDC GLUCOMTR-MCNC: 134 MG/DL (ref 70–130)
GLUCOSE BLDC GLUCOMTR-MCNC: 87 MG/DL (ref 70–130)
GLUCOSE SERPL-MCNC: 89 MG/DL (ref 65–99)
HCT VFR BLD AUTO: 30.1 % (ref 37.5–51)
HGB BLD-MCNC: 9.5 G/DL (ref 13–17.7)
IMM GRANULOCYTES # BLD AUTO: 0.08 10*3/MM3 (ref 0–0.05)
IMM GRANULOCYTES NFR BLD AUTO: 1.4 % (ref 0–0.5)
LYMPHOCYTES # BLD AUTO: 1.17 10*3/MM3 (ref 0.7–3.1)
LYMPHOCYTES NFR BLD AUTO: 19.9 % (ref 19.6–45.3)
MCH RBC QN AUTO: 27.1 PG (ref 26.6–33)
MCHC RBC AUTO-ENTMCNC: 31.6 G/DL (ref 31.5–35.7)
MCV RBC AUTO: 86 FL (ref 79–97)
MONOCYTES # BLD AUTO: 0.54 10*3/MM3 (ref 0.1–0.9)
MONOCYTES NFR BLD AUTO: 9.2 % (ref 5–12)
NEUTROPHILS NFR BLD AUTO: 3.78 10*3/MM3 (ref 1.7–7)
NEUTROPHILS NFR BLD AUTO: 64 % (ref 42.7–76)
NRBC BLD AUTO-RTO: 0 /100 WBC (ref 0–0.2)
PLATELET # BLD AUTO: 210 10*3/MM3 (ref 140–450)
PMV BLD AUTO: 12.2 FL (ref 6–12)
POTASSIUM SERPL-SCNC: 3.9 MMOL/L (ref 3.5–5.2)
PROT SERPL-MCNC: 5.8 G/DL (ref 6–8.5)
RBC # BLD AUTO: 3.5 10*6/MM3 (ref 4.14–5.8)
SODIUM SERPL-SCNC: 144 MMOL/L (ref 136–145)
WBC NRBC COR # BLD AUTO: 5.89 10*3/MM3 (ref 3.4–10.8)

## 2024-08-05 PROCEDURE — 85025 COMPLETE CBC W/AUTO DIFF WBC: CPT | Performed by: INTERNAL MEDICINE

## 2024-08-05 PROCEDURE — 80053 COMPREHEN METABOLIC PANEL: CPT | Performed by: INTERNAL MEDICINE

## 2024-08-05 PROCEDURE — 25810000003 SODIUM CHLORIDE 0.9 % SOLUTION 250 ML FLEX CONT: Performed by: INTERNAL MEDICINE

## 2024-08-05 PROCEDURE — 99231 SBSQ HOSP IP/OBS SF/LOW 25: CPT | Performed by: INTERNAL MEDICINE

## 2024-08-05 PROCEDURE — 99232 SBSQ HOSP IP/OBS MODERATE 35: CPT | Performed by: NURSE PRACTITIONER

## 2024-08-05 PROCEDURE — 82948 REAGENT STRIP/BLOOD GLUCOSE: CPT

## 2024-08-05 PROCEDURE — 25010000002 NA FERRIC GLUC CPLX PER 12.5 MG: Performed by: INTERNAL MEDICINE

## 2024-08-05 PROCEDURE — 25010000002 ENOXAPARIN PER 10 MG: Performed by: INTERNAL MEDICINE

## 2024-08-05 PROCEDURE — 25010000002 ERTAPENEM PER 500 MG: Performed by: INTERNAL MEDICINE

## 2024-08-05 PROCEDURE — 97116 GAIT TRAINING THERAPY: CPT

## 2024-08-05 PROCEDURE — 25010000002 ONDANSETRON PER 1 MG: Performed by: FAMILY MEDICINE

## 2024-08-05 RX ADMIN — ROSUVASTATIN CALCIUM 10 MG: 10 TABLET, FILM COATED ORAL at 22:32

## 2024-08-05 RX ADMIN — Medication 10 ML: at 08:41

## 2024-08-05 RX ADMIN — SODIUM BICARBONATE 650 MG: 650 TABLET ORAL at 22:32

## 2024-08-05 RX ADMIN — Medication 2.5 MG: at 22:26

## 2024-08-05 RX ADMIN — DOCUSATE SODIUM 50 MG AND SENNOSIDES 8.6 MG 2 TABLET: 8.6; 5 TABLET, FILM COATED ORAL at 22:27

## 2024-08-05 RX ADMIN — SODIUM BICARBONATE 650 MG: 650 TABLET ORAL at 08:34

## 2024-08-05 RX ADMIN — ENOXAPARIN SODIUM 135 MG: 150 INJECTION SUBCUTANEOUS at 22:32

## 2024-08-05 RX ADMIN — PREGABALIN 50 MG: 25 CAPSULE ORAL at 08:34

## 2024-08-05 RX ADMIN — Medication 10 ML: at 22:32

## 2024-08-05 RX ADMIN — OXYCODONE HYDROCHLORIDE AND ACETAMINOPHEN 1000 MG: 500 TABLET ORAL at 08:34

## 2024-08-05 RX ADMIN — DULOXETINE HYDROCHLORIDE 60 MG: 30 CAPSULE, DELAYED RELEASE ORAL at 08:34

## 2024-08-05 RX ADMIN — PANTOPRAZOLE SODIUM 40 MG: 40 TABLET, DELAYED RELEASE ORAL at 22:26

## 2024-08-05 RX ADMIN — FAMOTIDINE 20 MG: 20 TABLET ORAL at 08:33

## 2024-08-05 RX ADMIN — SODIUM BICARBONATE 650 MG: 650 TABLET ORAL at 17:25

## 2024-08-05 RX ADMIN — Medication 10 ML: at 22:33

## 2024-08-05 RX ADMIN — PANTOPRAZOLE SODIUM 40 MG: 40 TABLET, DELAYED RELEASE ORAL at 08:33

## 2024-08-05 RX ADMIN — ONDANSETRON 4 MG: 2 INJECTION INTRAMUSCULAR; INTRAVENOUS at 08:00

## 2024-08-05 RX ADMIN — DONEPEZIL HYDROCHLORIDE 10 MG: 10 TABLET, FILM COATED ORAL at 08:34

## 2024-08-05 RX ADMIN — OXYCODONE HYDROCHLORIDE 10 MG: 5 TABLET ORAL at 22:26

## 2024-08-05 RX ADMIN — ERTAPENEM 1000 MG: 1 INJECTION INTRAMUSCULAR; INTRAVENOUS at 14:19

## 2024-08-05 RX ADMIN — OXYCODONE HYDROCHLORIDE 10 MG: 5 TABLET ORAL at 08:34

## 2024-08-05 RX ADMIN — PREGABALIN 100 MG: 100 CAPSULE ORAL at 22:26

## 2024-08-05 RX ADMIN — FAMOTIDINE 20 MG: 20 TABLET ORAL at 22:26

## 2024-08-05 RX ADMIN — SODIUM CHLORIDE 250 MG: 9 INJECTION, SOLUTION INTRAVENOUS at 08:33

## 2024-08-05 RX ADMIN — ENOXAPARIN SODIUM 135 MG: 150 INJECTION SUBCUTANEOUS at 08:33

## 2024-08-05 NOTE — PAYOR COMM NOTE
"Erick Luong (69 y.o. Male)     [id number]   CONT CLINICAL   Please review clinical for Additional Days      [facility] phone     fax             Date of Birth   1955    Social Security Number       Address   [address] [place] [address]    Home Phone       MRN   [id number]       [place]   [facility]    Marital Status                               Admission Date   7/31/24    Admission Type   Emergency    Admitting Provider   Alejandrina Barnett DO    Attending Provider   Alejandrina Barnett DO    Department, Room/Bed   [facility] 3C, 365/1       Discharge Date       Discharge Disposition       Discharge Destination                                 Attending Provider: Alejandrina Barnett DO    Allergies: Cefepime, Bactrim [Sulfamethoxazole-trimethoprim], Vancomycin, Zolpidem, Metronidazole    Isolation: Contact   Infection: MRSA (05/19/19), COVID (History) (08/08/22), ESBL E coli (06/30/24)   Code Status: CPR    Ht: 182.9 cm (72\")   Wt: 131 kg (289 lb)    Admission Cmt: None   Principal Problem: DKA, type 2, not at goal [E11.10]                   Active Insurance as of 7/31/2024       Primary Coverage       Payor Plan Insurance Group Employer/Plan Group    [employer] [facility] [employer] [facility] [employer] EMPLOYEE [id number]       Payor Plan Address Payor Plan Phone Number Payor Plan Fax Number Effective Dates    [address] [phone]  1/1/2022 - None Entered    [facility] [id number]         Subscriber Name Subscriber Birth Date Member ID       ZAINAB LUONG 11/27/1969 [id number]               Secondary Coverage       Payor Plan Insurance Group Employer/Plan Group    MEDICARE MEDICARE A & B        Payor Plan Address Payor Plan Phone Number Payor Plan Fax Number Effective Dates    [address] [phone]  7/1/2013 - None Entered    [facility] [id number]         Subscriber Name Subscriber Birth Date Member ID       ERICK LUONG 1955 " [id number]                     Emergency Contacts        (Rel.) Home Phone Work Phone Mobile Phone    Joan Luong (Spouse) [phone]              Vital Signs (last day)       Date/Time Temp Temp src Pulse Resp BP Patient Position SpO2    08/05/24 0439 97.7 (36.5) Oral 69 18 121/55 Lying 97    08/05/24 0047 97.7 (36.5) Oral 69 18 139/60 Lying 96    08/04/24 1900 97.4 (36.3) Oral 70 18 129/72 Lying 98    08/04/24 1648 97.4 (36.3) Oral 78 18 156/78 Lying 90    08/04/24 1231 97.3 (36.3) Oral 81 18 145/63 Lying 99    08/04/24 0808 97.7 (36.5) Oral 79 18 123/57 Lying 96    08/04/24 0518 97.5 (36.4) Oral 82 16 130/66 Lying 96    08/04/24 0021 98 (36.7) Oral 84 16 126/90 Lying 100          Current Facility-Administered Medications   Medication Dose Route Frequency Provider Last Rate Last Admin    ascorbic acid (VITAMIN C) tablet 1,000 mg  1,000 mg Oral Daily Reuben Garza APRN   1,000 mg at 08/04/24 0819    sennosides-docusate (PERICOLACE) 8.6-50 MG per tablet 2 tablet  2 tablet Oral BID Lamine Figueroa APRN   2 tablet at 08/04/24 0819    And    polyethylene glycol (MIRALAX) packet 17 g  17 g Oral Daily PRN Lamine Figueroa APRN        And    bisacodyl (DULCOLAX) EC tablet 5 mg  5 mg Oral Daily PRN Lamine Figueroa APRN        And    bisacodyl (DULCOLAX) suppository 10 mg  10 mg Rectal Daily PRN Lamine Figueroa APRN   10 mg at 08/02/24 0941    Calcium Replacement - Follow Nurse / BPA Driven Protocol   Does not apply PRN Abebe Garzon DO        dextrose (D50W) (25 g/50 mL) IV injection 10-50 mL  10-50 mL Intravenous Q15 Min PRN Abebe Garzon DO        dextrose (D50W) (25 g/50 mL) IV injection 25 g  25 g Intravenous Q15 Min PRN Lamine Figueroa APRN        dextrose (GLUTOSE) oral gel 15 g  15 g Oral Q15 Min PRN Lamine Figueroa APRN        donepezil (ARICEPT) tablet 10 mg  10 mg Oral Daily Reuben Garza APRN   10 mg at 08/04/24 0937    DULoxetine (CYMBALTA)  DR capsule 60 mg  60 mg Oral Daily Reuben Garza APRN   60 mg at 08/04/24 0821    Enoxaparin Sodium (LOVENOX) syringe 135 mg  1 mg/kg Subcutaneous Q12H Abebe Garzon DO   135 mg at 08/04/24 2236    ertapenem (INVanz) 1,000 mg in sodium chloride 0.9 % 100 mL MBP  1,000 mg Intravenous Q24H Julia Adrian  mL/hr at 08/04/24 1357 1,000 mg at 08/04/24 1357    famotidine (PEPCID) tablet 20 mg  20 mg Oral Q12H Reuben Garza APRN   20 mg at 08/04/24 2237    ferric gluconate (FERRLECIT) 250 mg in sodium chloride 0.9 % 250 mL IVPB  250 mg Intravenous Daily Brian Lomas  mL/hr at 08/04/24 0937 250 mg at 08/04/24 0937    glucagon (GLUCAGEN) injection 1 mg  1 mg Intramuscular Q15 Min PRN Lamine Figueroa APRN        insulin glargine (LANTUS, SEMGLEE) injection 10 Units  10 Units Subcutaneous Daily Keren Jamison MD   10 Units at 08/04/24 0823    insulin regular (humuLIN R,novoLIN R) injection 3-14 Units  3-14 Units Subcutaneous 4x Daily AC & at Bedtime Keren Jamison MD   3 Units at 08/03/24 2033    Magnesium Standard Dose Replacement - Follow Nurse / BPA Driven Protocol   Does not apply PRN Abebe Garzon DO        melatonin tablet 2.5 mg  2.5 mg Oral Nightly Reuben Garza APRN   2.5 mg at 08/04/24 2237    nitroglycerin (NITROSTAT) SL tablet 0.4 mg  0.4 mg Sublingual Q5 Min PRN Lamine Figueroa APRN        ondansetron (ZOFRAN) injection 4 mg  4 mg Intravenous Q6H PRN Payam Keyes DO   4 mg at 08/05/24 0800    oxyCODONE (ROXICODONE) immediate release tablet 10 mg  10 mg Oral BID Lamine Figueroa APRN   10 mg at 08/04/24 2237    pantoprazole (PROTONIX) EC tablet 40 mg  40 mg Oral Q12H Reuben Garza APRN   40 mg at 08/04/24 4749    Pharmacy to Dose enoxaparin (LOVENOX)   Does not apply Continuous PRN Abebe Garzon DO        Phosphorus Replacement - Follow Nurse / BPA Driven Protocol   Does not apply PRN Abebe Garzon DO        polyethylene glycol (MIRALAX)  packet 17 g  17 g Oral Daily Reuben Garza APRN   17 g at 08/04/24 0819    Potassium Replacement - Follow Nurse / BPA Driven Protocol   Does not apply PRN Abebe Garzon DO        pregabalin (LYRICA) capsule 100 mg  100 mg Oral Nightly Reuben Garza APRN   100 mg at 08/04/24 2237    pregabalin (LYRICA) capsule 50 mg  50 mg Oral Daily Reuben Garza APRN   50 mg at 08/04/24 0821    rosuvastatin (CRESTOR) tablet 10 mg  10 mg Oral Nightly Reuben Garza APRN   10 mg at 08/04/24 2237    sodium bicarbonate tablet 650 mg  650 mg Oral TID Brian Lomas MD   650 mg at 08/04/24 2237    sodium chloride 0.9 % flush 10 mL  10 mL Intravenous Q12H Abebe Garzon DO   10 mL at 08/04/24 2238    sodium chloride 0.9 % flush 10 mL  10 mL Intravenous PRN Abebe Garzon DO        sodium chloride 0.9 % flush 10 mL  10 mL Intravenous Q12H Lamine Figueroa APRN   10 mL at 08/04/24 2237    sodium chloride 0.9 % flush 10 mL  10 mL Intravenous PRN Lamine Figueroa APRN        sodium chloride 0.9 % infusion 40 mL  40 mL Intravenous PRN Abebe Garzon,         sodium chloride 0.9 % infusion 40 mL  40 mL Intravenous PRN Lamine Figueroa APRN        sodium chloride 0.9 % infusion  50 mL/hr Intravenous Continuous Brian Lomas MD   Stopped at 08/04/24 2235    sodium hypochlorite (DAKIN'S 1/4 STRENGTH) 0.125 % topical solution 0.125% solution   Topical Q12H Aby High APRN   Given at 08/03/24 2034        Physician Progress Notes (last 48 hours)        Brian Lomas MD at 08/04/24 1441          Nephrology ([facility]) Progress Note      Patient:  Erick Luong  YOB: 1955  Date of Service: 8/4/2024  MRN: [id number]   Acct: [id number]   Primary Care Physician: Del Shetty MD  Advance Directive:   Code Status and Medical Interventions: CPR (Attempt to Resuscitate); Full Support   Ordered at: 08/01/24 0053     Level Of Support Discussed With:    Patient     Code  Status (Patient has no pulse and is not breathing):    CPR (Attempt to Resuscitate)     Medical Interventions (Patient has pulse or is breathing):    Full Support     Admit Date: 7/31/2024       Hospital Day: 4  Referring Provider: Abebe Garzon DO      Patient personally seen and examined.  Complete chart including Consults, Notes, Operative Reports, Labs, Cardiology, and Radiology studies reviewed as able.    Chief complaint: Abnormal labs.    Subjective:  Erick Luong is a 69 y.o. male for whom we were consulted for evaluation and treatment of acute kidney injury. Baseline chronic kidney disease stage 3b. Baseline creatinine approximately 1.5-1.8. Follows in our office.  History of poorly controlled type 2 diabetes, hypertension, coronary artery disease, diabetic foot ulcer, obesity.  On 7/31 patient was found wandering at home, confused. Brought to ER for evaluation. Has a nonhealing left foot diabetic ulcer with serosanguineous drainage. Blood glucose level was >700 with A1c >12%. Initial creatinine was 2.67 and has steadily improved since he was admitted. Nephrology consulted on 8/01. Hospital course remarkable for improving renal function and improved blood glucose levels.     He was initially admitted to CCU now transferred to regular floor.  He feels weak today patient is sitting up and eating lunch    Allergies:  Cefepime, Bactrim [sulfamethoxazole-trimethoprim], Vancomycin, Zolpidem, and Metronidazole    Home Meds:  Medications Prior to Admission   Medication Sig Dispense Refill Last Dose    ascorbic acid (VITAMIN C) 1000 MG tablet Take 1 tablet by mouth Daily. 30 tablet 3     bumetanide (BUMEX) 1 MG tablet Take 1 tablet by mouth 2 (Two) Times a Day for 30 days. 60 tablet 0     busPIRone (BUSPAR) 10 MG tablet Take 1 tablet by mouth 2 (Two) Times a Day.       calcitriol (ROCALTROL) 0.5 MCG capsule Take 1 capsule by mouth Daily. 90 capsule 4     carvedilol (COREG) 3.125 MG tablet Take 1 tablet twice a  day by oral route. 60 tablet 4     donepezil (ARICEPT) 10 MG tablet Take 1 tablet by mouth Daily. 90 tablet 1     DULoxetine (CYMBALTA) 60 MG capsule Take 1 capsule by mouth Daily. 90 capsule 4     famotidine (PEPCID) 20 MG tablet Take 1 tablet twice a day by oral route. 180 tablet 4     Insulin Glargine (Lantus SoloStar) 100 UNIT/ML injection pen Inject 20 Units under the skin into the appropriate area as directed every night at bedtime. 15 mL 3     Insulin Regular Human, Conc, (HumuLIN R) 500 UNIT/ML solution pen-injector CONCENTRATED injection Inject 40 Units under the skin into the appropriate area as directed 2 (Two) Times a Day Before Meals. Inject 40 units under the skin in the the appropriate area with regular meals AND 40 units with large meals.       Iron-Vitamin C (Vitron-C)  MG tablet Take 1 tablet twice a day by oral route. 60 tablet 2     levocetirizine (XYZAL) 5 MG tablet Take 1 tablet by mouth every day at bedtime. 30 tablet 2     melatonin 3 MG tablet Take 2 tablets by mouth At Night As Needed for Sleep. 30 tablet 0     oxyCODONE (ROXICODONE) 10 MG tablet Take 1 tablet by mouth 2 (Two) Times a Day As Needed. Must last 30 days per md. 55 tablet 0     pantoprazole (PROTONIX) 40 MG EC tablet Take 1 tablet by mouth Every 12 (Twelve) Hours. 180 tablet 4     pregabalin (LYRICA) 100 MG capsule Take 1 capsule by mouth Daily.       pregabalin (LYRICA) 100 MG capsule Take 2 capsules by mouth every night at bedtime.       rosuvastatin (CRESTOR) 10 MG tablet Take 1 tablet by mouth Every Night. 30 tablet 2     sucralfate (CARAFATE) 1 g tablet Take 1 tablet by mouth 4 (Four) Times a Day before meals. 360 tablet 1     Diclofenac Sodium (VOLTAREN) 1 % gel gel Apply 2 g topically to the appropriate area as directed 4 (Four) Times a Day As Needed. 300 g 11     nitroglycerin (NITROSTAT) 0.4 MG SL tablet Place 1 tablet under the tongue Every 5 (Five) Minutes As Needed for Chest Pain. Take no more than 3 doses in  15 minutes.       polyethylene glycol (MIRALAX) 17 GM/SCOOP powder Take 17 g by mouth Daily As Needed (constipation).       sennosides-docusate (PERICOLACE) 8.6-50 MG per tablet Take 1 tablet by mouth Every Night. Obtain OTC          Medicines:  Current Facility-Administered Medications   Medication Dose Route Frequency Provider Last Rate Last Admin    ascorbic acid (VITAMIN C) tablet 1,000 mg  1,000 mg Oral Daily Reuben Garza APRN   1,000 mg at 08/04/24 0819    sennosides-docusate (PERICOLACE) 8.6-50 MG per tablet 2 tablet  2 tablet Oral BID Lamine Figueroa APRN   2 tablet at 08/04/24 0819    And    polyethylene glycol (MIRALAX) packet 17 g  17 g Oral Daily PRN Lamine Figueroa APRN        And    bisacodyl (DULCOLAX) EC tablet 5 mg  5 mg Oral Daily PRN Lamine Figueroa APRN        And    bisacodyl (DULCOLAX) suppository 10 mg  10 mg Rectal Daily PRN Lamine Figueroa APRN   10 mg at 08/02/24 0941    calcitriol (ROCALTROL) capsule 0.25 mcg  0.25 mcg Oral Daily Brian Lomas MD   0.25 mcg at 08/04/24 0822    Calcium Replacement - Follow Nurse / BPA Driven Protocol   Does not apply PRN Abebe Garzon DO        dextrose (D50W) (25 g/50 mL) IV injection 10-50 mL  10-50 mL Intravenous Q15 Min PRN Abebe Garzon DO        dextrose (D50W) (25 g/50 mL) IV injection 25 g  25 g Intravenous Q15 Min PRN Lamine Figueroa APRN        dextrose (GLUTOSE) oral gel 15 g  15 g Oral Q15 Min PRN Lamine Figueroa APRN        donepezil (ARICEPT) tablet 10 mg  10 mg Oral Daily Reuben Garza APRN   10 mg at 08/04/24 0937    DULoxetine (CYMBALTA) DR capsule 60 mg  60 mg Oral Daily Reuben Garza APRN   60 mg at 08/04/24 0821    Enoxaparin Sodium (LOVENOX) syringe 135 mg  1 mg/kg Subcutaneous Q12H Abebe Garzon DO   135 mg at 08/04/24 0938    ertapenem (INVanz) 1,000 mg in sodium chloride 0.9 % 100 mL MBP  1,000 mg Intravenous Q24H Julia Adrian  mL/hr at 08/04/24 1357 1,000 mg at 08/04/24 1357     famotidine (PEPCID) tablet 20 mg  20 mg Oral Q12H Reuben Garza APRN   20 mg at 08/04/24 0819    ferric gluconate (FERRLECIT) 250 mg in sodium chloride 0.9 % 250 mL IVPB  250 mg Intravenous Daily Brian Lomas  mL/hr at 08/04/24 0937 250 mg at 08/04/24 0937    glucagon (GLUCAGEN) injection 1 mg  1 mg Intramuscular Q15 Min PRN Lamine Figueroa APRN        insulin glargine (LANTUS, SEMGLEE) injection 10 Units  10 Units Subcutaneous Daily Keren Jamison MD   10 Units at 08/04/24 0823    insulin regular (humuLIN R,novoLIN R) injection 3-14 Units  3-14 Units Subcutaneous 4x Daily AC & at Bedtime Keren Jamison MD   3 Units at 08/03/24 2033    Magnesium Standard Dose Replacement - Follow Nurse / BPA Driven Protocol   Does not apply PRN Abebe Garzon DO        melatonin tablet 2.5 mg  2.5 mg Oral Nightly Reuben Garza APRN   2.5 mg at 08/03/24 2033    nitroglycerin (NITROSTAT) SL tablet 0.4 mg  0.4 mg Sublingual Q5 Min PRN Lamine Figueroa APRN        ondansetron (ZOFRAN) injection 4 mg  4 mg Intravenous Q6H PRN Payam Keyes DO   4 mg at 08/04/24 1357    oxyCODONE (ROXICODONE) immediate release tablet 10 mg  10 mg Oral BID Lamine Figueroa APRN   10 mg at 08/04/24 0820    pantoprazole (PROTONIX) EC tablet 40 mg  40 mg Oral Q12H Reuben Garza APRN   40 mg at 08/04/24 0821    Pharmacy to Dose enoxaparin (LOVENOX)   Does not apply Continuous PRN Abebe Garzon DO        Phosphorus Replacement - Follow Nurse / BPA Driven Protocol   Does not apply PRN Abebe Garzon DO        polyethylene glycol (MIRALAX) packet 17 g  17 g Oral Daily Reuben Garza APRN   17 g at 08/04/24 0819    Potassium Replacement - Follow Nurse / BPA Driven Protocol   Does not apply PRN Abebe Garzon DO        pregabalin (LYRICA) capsule 100 mg  100 mg Oral Nightly Reuben Garza APRN   100 mg at 08/03/24 2033    pregabalin (LYRICA) capsule 50 mg  50 mg Oral Daily Reuben Garza APRN   50 mg at  08/04/24 0821    rosuvastatin (CRESTOR) tablet 10 mg  10 mg Oral Nightly Reuben Garza APRN   10 mg at 08/03/24 2033    sodium bicarbonate tablet 650 mg  650 mg Oral TID Brian Lomas MD   650 mg at 08/04/24 0820    sodium chloride 0.9 % flush 10 mL  10 mL Intravenous Q12H Abebe Garzon,    10 mL at 08/04/24 0823    sodium chloride 0.9 % flush 10 mL  10 mL Intravenous PRN Abebe Garzon, DO        sodium chloride 0.9 % flush 10 mL  10 mL Intravenous Q12H Lamine Figueroa APRN   10 mL at 08/04/24 0823    sodium chloride 0.9 % flush 10 mL  10 mL Intravenous PRN Lamine Figueroa APRN        sodium chloride 0.9 % infusion 40 mL  40 mL Intravenous PRN Abebe Garzon,         sodium chloride 0.9 % infusion 40 mL  40 mL Intravenous PRN Lamine Figueroa APRN        sodium chloride 0.9 % infusion  50 mL/hr Intravenous Continuous Brian Lomas MD 50 mL/hr at 08/03/24 2129 50 mL/hr at 08/03/24 2129    sodium hypochlorite (DAKIN'S 1/4 STRENGTH) 0.125 % topical solution 0.125% solution   Topical Q12H Aby High APRN   Given at 08/03/24 2034       Past Medical History:  Past Medical History:   Diagnosis Date    Arthritis     Autonomic disease     CAD (coronary artery disease) 02/06/2017    Cervical radiculopathy 09/16/2021    Chronic constipation with acute exaccerbation 05/10/2021    Coronary artery disease     Degeneration of cervical intervertebral disc 08/11/2021    Diabetes mellitus     Diabetic foot ulcer 08/31/2020    Diabetic polyneuropathy associated with type 2 diabetes mellitus 01/18/2021    Elevated cholesterol     Gastroesophageal reflux disease 05/13/2019    Headache     HTN (hypertension), benign 05/03/2017    Hyperlipidemia     Hypertension     Mixed hyperlipidemia 02/07/2017    Multiple lung nodules 01/26/2020    5mm, 9 mm RLL identified 1/2020, not present 10/2019.    Myocardial infarction     Osteomyelitis 01/22/2020    Osteomyelitis of fifth toe of right foot 10/07/2019     Pancreatitis     Persistent insomnia 01/20/2020    Renal disorder     Sleep apnea 02/06/2017    Sleep apnea with use of continuous positive airway pressure (CPAP)     NON-COMPLIANT    Slow transit constipation 01/16/2019    Spinal stenosis in cervical region 09/16/2021    Vitamin D deficiency 03/02/2021       Past Surgical History:  Past Surgical History:   Procedure Laterality Date    ABDOMINAL SURGERY      AMPUTATION FOOT / TOE Left 10/2021    5th digit     ANTERIOR CERVICAL DISCECTOMY W/ FUSION N/A 08/05/2022    Procedure: CERVICAL DISCECTOMY ANTERIOR WITH FUSION C5-6 with possible plating of C5-7 with neuromonitoring and 1 c-arm;  Surgeon: Karel Soliz MD;  Location: [facility] OR;  Service: Neurosurgery;  Laterality: N/A;    APPENDECTOMY      BACK SURGERY      CARDIAC CATHETERIZATION Left 02/08/2021    Procedure: Left Heart Cath w poss intervention left anatomical snuff box acess;  Surgeon: Omkar Charles DO;  Location: [facility] CATH INVASIVE LOCATION;  Service: Cardiology;  Laterality: Left;    CARDIAC SURGERY      CATARACT EXTRACTION      CERVICAL SPINE SURGERY      COLONOSCOPY N/A 01/31/2017    Normal exam repeat in 5 years    COLONOSCOPY N/A 02/11/2019    Mild acute inflammation    COLONOSCOPY N/A 04/07/2024    2 areas at 10 and 20 cm with friability ulceration 2 clips placed at 20 cm and 4 clips at 10 cm poor prep normal mucosa,mild eroisions and ulcerations in visible vessels    COLONOSCOPY N/A 7/1/2024    Procedure: COLONOSCOPY WITH ANESTHESIA;  Surgeon: Arsalan Lorenzo DO;  Location: [facility] ENDOSCOPY;  Service: Gastroenterology;  Laterality: N/A;  pre op constipation/diarrhea  post poor prep  pcp Del Shetty    COLONOSCOPY N/A 7/2/2024    Procedure: COLONOSCOPY WITH ANESTHESIA;  Surgeon: Agapito Christopher MD;  Location: [facility] ENDOSCOPY;  Service: Gastroenterology;  Laterality: N/A;  pre rectal bleeding  post poor prep  pcp Del Shetty MD    COLONOSCOPY W/ POLYPECTOMY  03/04/2014     Hyperplastic polyp    CORONARY ARTERY BYPASS GRAFT  10/2015    ENDOSCOPY  2011    Gastritis with hemorrhage    ENDOSCOPY N/A 2017    Normal exam    ENDOSCOPY N/A 2019    Gastritis    ENDOSCOPY N/A 2020    Non-erosive gastritis with hemorrhage    ENDOSCOPY N/A 02/10/2021    Esophagitis    ENDOSCOPY N/A 2024    There were esophageal mucosal changes suspicious for short-segment Low's esophagus present in the distal esophagus. The maximum longitudinal extent of these mucosal changes was 2 cm in length. Mucosa was biopsied with a cold forceps for histologyDistal esophagus, biopsies: Mild chronic active esophagogastritis. No evidence of intestinal metaplasia, dysplasia. Antrum, bx, Mild chronic gastritis    FOOT SURGERY Left     INCISION AND DRAINAGE OF WOUND Left 2007    spider bite       Family History  Family History   Problem Relation Age of Onset    Colon cancer Father     Heart disease Father     Colon cancer Sister     Colon polyps Sister     Alzheimer's disease Mother     Coronary artery disease Sister     Coronary artery disease Sister        Social History  Social History     Socioeconomic History    Marital status:    Tobacco Use    Smoking status: Former     Current packs/day: 0.00     Types: Cigarettes     Quit date:      Years since quittin.6    Smokeless tobacco: Never    Tobacco comments:     smoked in [place]   Vaping Use    Vaping status: Never Used   Substance and Sexual Activity    Alcohol use: No    Drug use: No    Sexual activity: Defer       Review of Systems:  History obtained from chart review and the patient  General ROS: No fever or chills  Respiratory ROS: No cough, shortness of breath, wheezing  Cardiovascular ROS: No chest pain or palpitations  Gastrointestinal ROS: No abdominal pain or melena  Genito-Urinary ROS: No dysuria or hematuria  Psych ROS: No anxiety and depression  14 point ROS reviewed with the patient and negative except  as noted above and in the HPI unless unable to obtain.    Objective:  Patient Vitals for the past 24 hrs:   BP Temp Temp src Pulse Resp SpO2 Weight   08/04/24 1231 145/63 97.3 °F (36.3 °C) Oral 81 18 99 % --   08/04/24 0808 123/57 97.7 °F (36.5 °C) Oral 79 18 96 % --   08/04/24 0518 130/66 97.5 °F (36.4 °C) Oral 82 16 96 % 131 kg (289 lb)   08/04/24 0021 126/90 98 °F (36.7 °C) Oral 84 16 100 % --   08/03/24 1916 121/93 97.5 °F (36.4 °C) Oral 83 16 100 % --   08/03/24 1650 141/65 97.7 °F (36.5 °C) Oral 79 16 100 % --       Intake/Output Summary (Last 24 hours) at 8/4/2024 1441  Last data filed at 8/4/2024 1231  Gross per 24 hour   Intake 1810 ml   Output 400 ml   Net 1410 ml     General: awake/alert   HEENT: Normocephalic atraumatic head  Neck: Supple with no JVD or carotid bruits.  Chest:  clear to auscultation bilaterally without respiratory distress  CVS: regular rate and rhythm  Abdominal: soft, nontender, positive bowel sounds  Extremities: Left foot ulcer at the plantar aspect  Skin:  left foot ulcer and warm and dry without rash      Labs:  Results from last 7 days   Lab Units 08/04/24 0615 08/03/24 0605 08/02/24  0558   WBC 10*3/mm3 5.73 5.75 9.76   HEMOGLOBIN g/dL 9.5* 10.3* 10.0*   HEMATOCRIT % 30.5* 33.2* 31.8*   PLATELETS 10*3/mm3 201 215 219         Results from last 7 days   Lab Units 08/04/24 0615 08/03/24  0605 08/02/24  0558 07/31/24 2030 07/31/24  1849   SODIUM mmol/L 145 143 141   < > 132*   POTASSIUM mmol/L 3.9 3.8 3.8   < > 4.5   CHLORIDE mmol/L 111* 110* 108*   < > 97*   CO2 mmol/L 21.0* 19.0* 19.0*   < > 19.0*   BUN mg/dL 24* 31* 37*   < > 55*   CREATININE mg/dL 1.60* 1.82* 1.98*   < > 2.67*   CALCIUM mg/dL 8.5* 8.3* 8.5*   < > 9.0   EGFR mL/min/1.73 46.6* 40.0* 36.1*   < > 25.2*   BILIRUBIN mg/dL 0.3 0.3  --   --  0.6   ALK PHOS U/L 95 88  --   --  101   ALT (SGPT) U/L 9 7  --   --  8   AST (SGOT) U/L 15 12  --   --  11   GLUCOSE mg/dL 97 125* 157*   < > 704*    < > = values in this  interval not displayed.       Radiology:   Imaging Results (Last 72 Hours)       ** No results found for the last 72 hours. **            Culture:  Blood Culture   Date Value Ref Range Status   07/31/2024 No growth at 24 hours  Preliminary   07/31/2024 Staphylococcus, coagulase negative (C)  Final         Assessment    Acute kidney injury/improving  Acute tubular necrosis.  Stage IIIb CKD baseline  Type II diabetic nephropathy  Benign essential hypertension  Sepsis related to left diabetic foot ulcer  Anemia of CKD  Morbid abdominal obesity    Plan:  Continue IV antibiotics  Continue to monitor renal function  Intravenous ferric gluconate  Plan was discussed with the patient      Brian Lomas MD  8/4/2024  14:41 CDT      Electronically signed by Brian Lomas MD at 08/04/24 1442       Payam Keyes DO at 08/04/24 1373              [facility] Medicine Services  INPATIENT PROGRESS NOTE    Patient Name: Erick Luong  Date of Admission: 7/31/2024  Today's Date: 08/04/24  Length of Stay: 4  Primary Care Physician: Del Shetty MD    Subjective   Chief Complaint: Generalized weakness  HPI     The patient is improving slowly.  He is currently being cleaned up by the nursing staff after a bowel meant accident in his room.  Urine culture positive for ESBL E. coli and infectious diseases has made antibiotic changes to ertapenem.  Renal function is at baseline with creatinine 1.6.  The patient remains in sinus rhythm and continues to receive wound care for his heel ulcer.    Review of Systems   All pertinent negatives and positives are as above. All other systems have been reviewed and are negative unless otherwise stated.     Objective    Temp:  [97.3 °F (36.3 °C)-98 °F (36.7 °C)] 97.3 °F (36.3 °C)  Heart Rate:  [79-84] 81  Resp:  [16-18] 18  BP: (121-145)/(57-93) 145/63  Physical Exam    Constitutional:       Appearance: Normal appearance. He is obese.   HENT:      Head:  Normocephalic and atraumatic.      Right Ear: External ear normal.      Left Ear: External ear normal.      Nose: Nose normal.      Mouth/Throat:      Mouth: Mucous membranes are moist.      Pharynx: Oropharynx is clear.   Eyes:      General: No scleral icterus.     Conjunctiva/sclera: Conjunctivae normal.   Cardiovascular:      Rate and Rhythm: Normal rate and regular rhythm.      Pulses: Normal pulses.      Heart sounds: Normal heart sounds.   Pulmonary:      Effort: Pulmonary effort is normal. No respiratory distress.      Breath sounds: Normal breath sounds.   Abdominal:      General: Bowel sounds are normal.      Palpations: Abdomen is soft. There is no mass.      Tenderness: There is no abdominal tenderness.   Musculoskeletal:         General: Normal range of motion.      Right lower leg: Edema present.      Left lower leg: Edema present.   Skin:     General: Skin is warm and dry.      Comments: Left heel wound covered with appropriate dressing.   Neurological:      General: No focal deficit present.      Mental Status: He is alert and oriented to person, place, and time.   Psychiatric:         Mood and Affect: Mood normal.         Judgment: Judgment normal.     Results Review:  I have reviewed the labs, radiology results, and diagnostic studies.    Laboratory Data:   Results from last 7 days   Lab Units 08/04/24  0615 08/03/24  0605 08/02/24  0558   WBC 10*3/mm3 5.73 5.75 9.76   HEMOGLOBIN g/dL 9.5* 10.3* 10.0*   HEMATOCRIT % 30.5* 33.2* 31.8*   PLATELETS 10*3/mm3 201 215 219        Results from last 7 days   Lab Units 08/04/24  0615 08/03/24  0605 08/02/24  0558 07/31/24  2030 07/31/24  1849   SODIUM mmol/L 145 143 141   < > 132*   POTASSIUM mmol/L 3.9 3.8 3.8   < > 4.5   CHLORIDE mmol/L 111* 110* 108*   < > 97*   CO2 mmol/L 21.0* 19.0* 19.0*   < > 19.0*   BUN mg/dL 24* 31* 37*   < > 55*   CREATININE mg/dL 1.60* 1.82* 1.98*   < > 2.67*   CALCIUM mg/dL 8.5* 8.3* 8.5*   < > 9.0   BILIRUBIN mg/dL 0.3 0.3  --    --  0.6   ALK PHOS U/L 95 88  --   --  101   ALT (SGPT) U/L 9 7  --   --  8   AST (SGOT) U/L 15 12  --   --  11   GLUCOSE mg/dL 97 125* 157*   < > 704*    < > = values in this interval not displayed.       Culture Data:   Blood Culture   Date Value Ref Range Status   07/31/2024 No growth at 3 days  Preliminary   07/31/2024 Staphylococcus, coagulase negative (C)  Final     Urine Culture   Date Value Ref Range Status   07/31/2024 >100,000 CFU/mL Escherichia coli ESBL (A)  Final     Wound Culture   Date Value Ref Range Status   08/02/2024 (A)  Final    Light growth (2+) Streptococcus agalactiae (Group B)     Comment:       This organism is considered to be universally susceptible to penicillin.  No further antibiotic testing will be performed. If Clindamycin or Erythromycin is the drug of choice, notify the laboratory within 7 days to request susceptibility testing.   08/02/2024 (A)  Final    Light growth (2+) Streptococcus agalactiae (Group B)     Comment:       This organism is considered to be universally susceptible to penicillin.  No further antibiotic testing will be performed. If Clindamycin or Erythromycin is the drug of choice, notify the laboratory within 7 days to request susceptibility testing.       Radiology Data:   Imaging Results (Last 24 Hours)       ** No results found for the last 24 hours. **            I have reviewed the patient's current medications.     Assessment/Plan   Assessment  Active Hospital Problems    Diagnosis     **DKA, type 2, not at goal     E. coli UTI, ESBL     Atrial fibrillation     Chronic heart failure with preserved ejection fraction (HFpEF)     Chronic diastolic heart failure     GERD without esophagitis     Diabetic ulcer of left foot associated with type 2 diabetes mellitus        Treatment Plan  Continue wound care  IV antibiotics per infectious diseases  PT/OT evaluations for discharge placement assessment  Ambulation encouraged     Medical Decision Making  Number and  "Complexity of problems:   3 acute, high complexity problems  4+ chronic, moderate complexity problems     Differential Diagnosis: None     Conditions and Status        Condition is improving.     [facility] Data  External documents reviewed: Care Everywhere  Cardiac tracing (EKG, telemetry) interpretation: See HPI  Radiology interpretation: See HPI  Labs reviewed: See HPI  Any tests that were considered but not ordered: None     Decision rules/scores evaluated (example [id number], Wells, etc): None     Discussed with: The patient     Care Planning  Shared decision making: Patient  Code status and discussions: Full code     Disposition  Social Determinants of Health that impact treatment or disposition: None  I expect the patient to be discharged to home or SNF in 2-3 days.     Electronically signed by Payam Keyes DO, 08/04/24, 13:58 CDT.      Electronically signed by Payam Keyes DO at 08/04/24 1406       Julia Adrian MD at 08/04/24 1140          INFECTIOUS DISEASES PROGRESS NOTE    Patient:  Erick Luong  YOB: 1955  MRN: [id number]   Admit date: 7/31/2024   Admitting Physician: Payam Keyes DO  Primary Care Physician: Del Shetty MD    Chief Complaint: Requesting a diet Sprite    Interval History: Patient offers no new complaints just was requesting a diet Sprite.  When asked him about his cefepime allergy and what other antibiotics he could tolerate, he did not know.  I asked him what was the last antibiotic Dr. Gomes had him on and he was not aware.    Debrided foot cultures positive for group B streptococcus    Lab called positive ESBL urine culture while I was on the floor          Allergies:   Allergies   Allergen Reactions    Cefepime Hives and Anaphylaxis    Bactrim [Sulfamethoxazole-Trimethoprim] Other (See Comments)     \"RENAL FAILURE\"    Vancomycin Itching    Zolpidem Mental Status Change     \"makes him crazy\"    Metronidazole Rash       Current " "Scheduled Medications:   ascorbic acid, 1,000 mg, Oral, Daily  calcitriol, 0.25 mcg, Oral, Daily  donepezil, 10 mg, Oral, Daily  DULoxetine, 60 mg, Oral, Daily  enoxaparin, 1 mg/kg, Subcutaneous, Q12H  famotidine, 20 mg, Oral, Q12H  ferric gluconate, 250 mg, Intravenous, Daily  insulin glargine, 10 Units, Subcutaneous, Daily  insulin regular, 3-14 Units, Subcutaneous, 4x Daily AC & at Bedtime  melatonin, 2.5 mg, Oral, Nightly  oxyCODONE, 10 mg, Oral, BID  pantoprazole, 40 mg, Oral, Q12H  polyethylene glycol, 17 g, Oral, Daily  pregabalin, 100 mg, Oral, Nightly  pregabalin, 50 mg, Oral, Daily  rosuvastatin, 10 mg, Oral, Nightly  senna-docusate sodium, 2 tablet, Oral, BID  sodium bicarbonate, 650 mg, Oral, TID  sodium chloride, 10 mL, Intravenous, Q12H  sodium chloride, 10 mL, Intravenous, Q12H  sodium hypochlorite, , Topical, Q12H  vancomycin, 750 mg, Intravenous, Q24H      Current PRN Medications:    senna-docusate sodium **AND** polyethylene glycol **AND** bisacodyl **AND** bisacodyl    Calcium Replacement - Follow Nurse / BPA Driven Protocol    dextrose    dextrose    dextrose    glucagon (human recombinant)    Magnesium Standard Dose Replacement - Follow Nurse / BPA Driven Protocol    nitroglycerin    ondansetron    Pharmacy to Dose enoxaparin (LOVENOX)    Phosphorus Replacement - Follow Nurse / BPA Driven Protocol    Potassium Replacement - Follow Nurse / BPA Driven Protocol    sodium chloride    sodium chloride    sodium chloride    sodium chloride    Pharmacy to Dose enoxaparin (LOVENOX),   sodium chloride, 50 mL/hr, Last Rate: 50 mL/hr (24)           Objective     Vital Signs:  Temp (24hrs), Av.6 °F (36.4 °C), Min:97.3 °F (36.3 °C), Max:98 °F (36.7 °C)      /57 (BP Location: Right arm, Patient Position: Lying)   Pulse 79   Temp 97.7 °F (36.5 °C) (Oral)   Resp 18   Ht 182.9 cm (72\")   Wt 131 kg (289 lb)   SpO2 96%   BMI 39.20 kg/m²         Physical Exam:    General: Patient lying in " bed on his left side in no acute distress  Respiratory: Effort even and unlabored, he is not conversationally dyspneic  Left foot dressings in place with sock over it.  He indicated it just been changed  Left antecubital IV without any evidence of phlebitis    Left lateral foot postdebridement August 2        Results Review:    I reviewed the patient's new clinical results.    Lab Results:    CBC:   Lab Results   Lab 07/31/24  1849 08/01/24  0419 08/02/24  0558 08/03/24  0605 08/04/24  0615   WBC 15.48* 12.83* 9.76 5.75 5.73   HEMOGLOBIN 9.8* 9.0* 10.0* 10.3* 9.5*   HEMATOCRIT 29.6* 28.3* 31.8* 33.2* 30.5*   PLATELETS 201 176 219 215 201        AutoDiff:   Lab Results   Lab 08/02/24  0558 08/03/24  0605 08/04/24  0615   NEUTROPHIL % 71.6 57.9 60.7   LYMPHOCYTE % 12.7* 20.9 20.1   MONOCYTES % 8.5 8.7 9.6   EOSINOPHIL % 6.3* 11.1* 7.9*   BASOPHIL % 0.6 0.9 0.7   NEUTROS ABS 6.99 3.33 3.48   LYMPHS ABS 1.24 1.20 1.15   MONOS ABS 0.83 0.50 0.55   EOS ABS 0.61* 0.64* 0.45*   BASOS ABS 0.06 0.05 0.04        Manual Diff:    Lab Results   Lab 08/02/24  0558 08/03/24  0605 08/04/24  0615   NEUTROS ABS 6.99 3.33 3.48           CMP:   Lab Results   Lab 07/31/24  1849 07/31/24  2030 08/02/24  0558 08/03/24  0605 08/04/24  0615   SODIUM 132*   < > 141 143 145   POTASSIUM 4.5   < > 3.8 3.8 3.9   CHLORIDE 97*   < > 108* 110* 111*   CO2 19.0*   < > 19.0* 19.0* 21.0*   BUN 55*   < > 37* 31* 24*   CREATININE 2.67*   < > 1.98* 1.82* 1.60*   CALCIUM 9.0   < > 8.5* 8.3* 8.5*   BILIRUBIN 0.6  --   --  0.3 0.3   ALK PHOS 101  --   --  88 95   ALT (SGPT) 8  --   --  7 9   AST (SGOT) 11  --   --  12 15   GLUCOSE 704*   < > 157* 125* 97    < > = values in this interval not displayed.       Estimated Creatinine Clearance: 61.9 mL/min (A) (by C-G formula based on SCr of 1.6 mg/dL (H)).    Culture Results:    Microbiology Results (last 10 days)       Procedure Component Value - Date/Time    Wound Culture - Drainage, Foot, Left [[id number]]   (Abnormal) Collected: 08/02/24 1338    Lab Status: Final result Specimen: Drainage from Foot, Left Updated: 08/04/24 0857     Wound Culture Light growth (2+) Streptococcus agalactiae (Group B)     Comment:   This organism is considered to be universally susceptible to penicillin.  No further antibiotic testing will be performed. If Clindamycin or Erythromycin is the drug of choice, notify the laboratory within 7 days to request susceptibility testing.        Gram Stain Rare (1+) WBCs seen      No organisms seen    Wound Culture - Wound, Foot, Left [[id number]]  (Abnormal) Collected: 08/02/24 1032    Lab Status: Final result Specimen: Wound from Foot, Left Updated: 08/04/24 0857     Wound Culture Light growth (2+) Streptococcus agalactiae (Group B)     Comment:   This organism is considered to be universally susceptible to penicillin.  No further antibiotic testing will be performed. If Clindamycin or Erythromycin is the drug of choice, notify the laboratory within 7 days to request susceptibility testing.        Gram Stain Few (2+) WBCs seen      Rare (1+) Gram positive cocci    Eosinophil Smear - Urine, Urine, Clean Catch [[id number]]  (Normal) Collected: 08/01/24 1547    Lab Status: Final result Specimen: Urine, Clean Catch Updated: 08/02/24 0149     Eosinophil Smear 0 % EOS/100 Cells     MRSA Screen, PCR (Inpatient) - Swab, Nares [[id number]]  (Normal) Collected: 08/01/24 0206    Lab Status: Final result Specimen: Swab from Nares Updated: 08/01/24 0346     MRSA PCR No MRSA Detected    Narrative:      The negative predictive value of this diagnostic test is high and should only be used to consider de-escalating anti-MRSA therapy. A positive result may indicate colonization with MRSA and must be correlated clinically.    Urine Culture - Urine, Straight Cath [[id number]]  (Abnormal)  (Susceptibility) Collected: 07/31/24 1851    Lab Status: Final result Specimen: Urine from Straight Cath Updated: 08/04/24 1129     Urine  Culture >100,000 CFU/mL Escherichia coli ESBL    Narrative:      Colonization of the urinary tract without infection is common. Treatment is discouraged unless the patient is symptomatic, pregnant, or undergoing an invasive urologic procedure.  Recent outcomes data supports the use of pip/tazo in the treatment of susceptible ESBL infections for uncomplicated UTI. Consider use of pip/tazo as a carbapenem-sparing regimen in applicable patients.    Susceptibility        Escherichia coli ESBL      MATT      Amikacin Susceptible      Ertapenem Susceptible      Gentamicin Resistant      Levofloxacin Susceptible      Meropenem Susceptible      Nitrofurantoin Susceptible      Piperacillin + Tazobactam Susceptible      Tobramycin Resistant      Trimethoprim + Sulfamethoxazole Resistant                           Blood Culture - Blood, Hand, Left [[id number]]  (Normal) Collected: 07/31/24 1849    Lab Status: Preliminary result Specimen: Blood from Hand, Left Updated: 08/03/24 1916     Blood Culture No growth at 3 days    Blood Culture - Blood, Arm, Left [[id number]]  (Abnormal) Collected: 07/31/24 1849    Lab Status: Final result Specimen: Blood from Arm, Left Updated: 08/02/24 0545     Blood Culture Staphylococcus, coagulase negative     Isolated from Aerobic Bottle     Gram Stain Aerobic Bottle Gram positive cocci in clusters    Narrative:      Probable contaminant requires clinical correlation, susceptibility not performed unless requested by physician.      Blood Culture ID, PCR - Blood, Arm, Left [[id number]]  (Abnormal) Collected: 07/31/24 1849    Lab Status: Final result Specimen: Blood from Arm, Left Updated: 08/01/24 1117     BCID, PCR Staph spp, not aureus or lugdunensis. Identification by BCID2 PCR.     BOTTLE TYPE Aerobic Bottle                 Radiology:   Imaging Results (Last 72 Hours)       Procedure Component Value Units Date/Time    US Renal Bilateral [[id number]] Collected: 08/01/24 1300     Updated: 08/01/24  1305    Narrative:      US RENAL BILATERAL- 8/1/2024 11:19 AM     HISTORY: Acute kidney injury; E11.628-Type 2 diabetes mellitus with  other skin complications; L08.9-Local infection of the skin and  subcutaneous tissue, unspecified; M86.9-Osteomyelitis, unspecified;  E11.10-Type 2 diabetes mellitus with ketoacidosis without coma;  R79.89-Other specified abnormal findings of blood chemistry;  I48.91-Unspecified atrial fibrillation; R41.0-Disorientation,  unspecified     COMPARISON: None available.       TECHNIQUE: Multiple longitudinal and transverse real-time sonographic  images of the kidneys and urinary bladder are obtained. Report and  images stored per institutional and state regulations.           FINDINGS:     Visualized proximal abdominal aorta and IVC are unremarkable.        RIGHT KIDNEY: Right kidney is 10.7 cm in length. Renal cortex is  unremarkable. There is no hydronephrosis. There is an exophytic anechoic  simple right renal cyst measuring 4.9 cm..     LEFT KIDNEY: Left kidney is 11.3 cm in length. Renal cortex is  unremarkable. There is no left hydronephrosis.     PELVIS: Urinary bladder is unremarkable.          Impression:         1. Simple right renal cyst.  2. Otherwise unremarkable kidneys and no hydronephrosis.           This report was signed and finalized on 8/1/2024 1:02 PM by Eran Christina.                   Active Hospital Problems    Diagnosis     **DKA, type 2, not at goal     E. coli UTI     Atrial fibrillation     Chronic heart failure with preserved ejection fraction (HFpEF)     Chronic diastolic heart failure     GERD without esophagitis     Diabetic ulcer of left foot associated with type 2 diabetes mellitus        IMPRESSION:  Diabetic foot infection-cultures on 2 separate occasions positive for group B streptococcus.  Debrided cultures obtained Per SUSAN Hoffmann.  Appreciate podiatry's assistance  History of osteomyelitis with MRSA status posttreatment with vancomycin and  transition to linezolid.    Uncontrolled diabetes mellitus with hemoglobin A1c greater than 12.    Chronic kidney disease stage IIIb with acute kidney injury.  Creatinine continues to trend down.  Urine culture positive for ESBL E. coli.  Reported to straight cath specimen.      RECOMMENDATION:   Discontinue vancomycin  Start ertapenem to cover both ESBL in urine as well as group B streptococcus  Dosing changes per podiatry.   Diabetes management per attending            Julia Adrian MD  08/04/24  11:40 CDT        Electronically signed by Julia Adrian MD at 08/04/24 1222       Payam Keyes DO at 08/03/24 4407              [facility] Medicine Services  INPATIENT PROGRESS NOTE    Patient Name: Erick Luong  Date of Admission: 7/31/2024  Today's Date: 08/03/24  Length of Stay: 3  Primary Care Physician: Del Shetty MD    Subjective   Chief Complaint: Generalized weakness, DKA  HPI     The patient notes that he is feeling better.  He is sitting in the chair at the foot of his bed at the time of my evaluation.  He has been followed by infectious diseases and nephrology.  Renal function is at baseline with creatinine 1.83.  Heart rate is well-controlled.  He remains in normal sinus rhythm.  He remains on wound care for heel ulcer.  Wound culture of the left foot reveals Streptococcus agalactiae.  Urine culture revealed E. coli.  Coli sensitivities are pending.  He remains empirically on IV vancomycin.    Review of Systems   All pertinent negatives and positives are as above. All other systems have been reviewed and are negative unless otherwise stated.     Objective    Temp:  [97.3 °F (36.3 °C)-97.8 °F (36.6 °C)] 97.3 °F (36.3 °C)  Heart Rate:  [77-87] 78  Resp:  [15-16] 16  BP: (115-141)/(56-89) 134/79  Physical Exam  Constitutional:       Appearance: Normal appearance. He is obese.   HENT:      Head: Normocephalic and atraumatic.      Right Ear: External  ear normal.      Left Ear: External ear normal.      Nose: Nose normal.      Mouth/Throat:      Mouth: Mucous membranes are moist.      Pharynx: Oropharynx is clear.   Eyes:      General: No scleral icterus.     Conjunctiva/sclera: Conjunctivae normal.   Cardiovascular:      Rate and Rhythm: Normal rate and regular rhythm.      Pulses: Normal pulses.      Heart sounds: Normal heart sounds.   Pulmonary:      Effort: Pulmonary effort is normal. No respiratory distress.      Breath sounds: Normal breath sounds.   Abdominal:      General: Bowel sounds are normal.      Palpations: Abdomen is soft. There is no mass.      Tenderness: There is no abdominal tenderness.   Musculoskeletal:         General: Normal range of motion.      Right lower leg: Edema present.      Left lower leg: Edema present.   Skin:     General: Skin is warm and dry.      Comments: Left heel wound covered with appropriate dressing.   Neurological:      General: No focal deficit present.      Mental Status: He is alert and oriented to person, place, and time.   Psychiatric:         Mood and Affect: Mood normal.         Judgment: Judgment normal.       Results Review:  I have reviewed the labs, radiology results, and diagnostic studies.    Laboratory Data:   Results from last 7 days   Lab Units 08/03/24  0605 08/02/24  0558 08/01/24  0419   WBC 10*3/mm3 5.75 9.76 12.83*   HEMOGLOBIN g/dL 10.3* 10.0* 9.0*   HEMATOCRIT % 33.2* 31.8* 28.3*   PLATELETS 10*3/mm3 215 219 176        Results from last 7 days   Lab Units 08/03/24  0605 08/02/24  0558 08/01/24  0419 07/31/24  2030 07/31/24  1849   SODIUM mmol/L 143 141 138   < > 132*   POTASSIUM mmol/L 3.8 3.8 4.1   < > 4.5   CHLORIDE mmol/L 110* 108* 103   < > 97*   CO2 mmol/L 19.0* 19.0* 21.0*   < > 19.0*   BUN mg/dL 31* 37* 50*   < > 55*   CREATININE mg/dL 1.82* 1.98* 2.28*   < > 2.67*   CALCIUM mg/dL 8.3* 8.5* 8.8   < > 9.0   BILIRUBIN mg/dL 0.3  --   --   --  0.6   ALK PHOS U/L 88  --   --   --  101   ALT  (SGPT) U/L 7  --   --   --  8   AST (SGOT) U/L 12  --   --   --  11   GLUCOSE mg/dL 125* 157* 370*   < > 704*    < > = values in this interval not displayed.       Culture Data:   Blood Culture   Date Value Ref Range Status   07/31/2024 No growth at 2 days  Preliminary   07/31/2024 Staphylococcus, coagulase negative (C)  Final     Urine Culture   Date Value Ref Range Status   07/31/2024 >100,000 CFU/mL Escherichia coli (A)  Preliminary     Comment:     ESBL confirmation in progress. 8.3     Wound Culture   Date Value Ref Range Status   08/02/2024 (A)  Preliminary    Light growth (2+) Streptococcus agalactiae (Group B)     Comment:       This organism is considered to be universally susceptible to penicillin.  No further antibiotic testing will be performed. If Clindamycin or Erythromycin is the drug of choice, notify the laboratory within 7 days to request susceptibility testing.   08/02/2024 (A)  Preliminary    Light growth (2+) Streptococcus agalactiae (Group B)     Comment:       This organism is considered to be universally susceptible to penicillin.  No further antibiotic testing will be performed. If Clindamycin or Erythromycin is the drug of choice, notify the laboratory within 7 days to request susceptibility testing.       Radiology Data:   Imaging Results (Last 24 Hours)       ** No results found for the last 24 hours. **            I have reviewed the patient's current medications.     Assessment/Plan   Assessment  Active Hospital Problems    Diagnosis     **DKA, type 2, not at goal     E. coli UTI     Atrial fibrillation     Chronic heart failure with preserved ejection fraction (HFpEF)     Chronic diastolic heart failure     GERD without esophagitis     Diabetic ulcer of left foot associated with type 2 diabetes mellitus        Treatment Plan  Continue wound care  IV antibiotics per infectious diseases  PT/OT evaluations for discharge placement assessment  Ambulation encouraged    Medical Decision  Making  Number and Complexity of problems:   3 acute, high complexity problems  4+ chronic, moderate complexity problems    Differential Diagnosis: None    Conditions and Status        Condition is improving.     [facility] Data  External documents reviewed: Care Everywhere  Cardiac tracing (EKG, telemetry) interpretation: See HPI  Radiology interpretation: See HPI  Labs reviewed: See HPI  Any tests that were considered but not ordered: None     Decision rules/scores evaluated (example [id number], Wells, etc): None     Discussed with: The patient     Care Planning  Shared decision making: Patient  Code status and discussions: Full code    Disposition  Social Determinants of Health that impact treatment or disposition: None  I expect the patient to be discharged to home or SNF in 2-3 days.     Electronically signed by Payam Keyes DO, 08/03/24, 13:58 CDT.      Electronically signed by Payam Keyes DO at 08/04/24 1424       Brian Lomas MD at 08/03/24 1334          Nephrology ([facility]) Progress Note      Patient:  Erick Luong  YOB: 1955  Date of Service: 8/3/2024  MRN: [id number]   Acct: [id number]   Primary Care Physician: Del Shetty MD  Advance Directive:   Code Status and Medical Interventions: CPR (Attempt to Resuscitate); Full Support   Ordered at: 08/01/24 0053     Level Of Support Discussed With:    Patient     Code Status (Patient has no pulse and is not breathing):    CPR (Attempt to Resuscitate)     Medical Interventions (Patient has pulse or is breathing):    Full Support     Admit Date: 7/31/2024       Hospital Day: 3  Referring Provider: Abebe Garzon DO      Patient personally seen and examined.  Complete chart including Consults, Notes, Operative Reports, Labs, Cardiology, and Radiology studies reviewed as able.    Chief complaint: Abnormal labs.    Subjective:  Erick Luong is a 69 y.o. male for whom we were consulted for evaluation  and treatment of acute kidney injury. Baseline chronic kidney disease stage 3b. Baseline creatinine approximately 1.5-1.8. Follows in our office.  History of poorly controlled type 2 diabetes, hypertension, coronary artery disease, diabetic foot ulcer, obesity.  On 7/31 patient was found wandering at home, confused. Brought to ER for evaluation. Has a nonhealing left foot diabetic ulcer with serosanguineous drainage. Blood glucose level was >700 with A1c >12%. Initial creatinine was 2.67 and has steadily improved since he was admitted. Nephrology consulted on 8/01. Hospital course remarkable for improving renal function and improved blood glucose levels.     He was initially admitted to CCU now transferred to regular floor.  He feels weak today patient is sitting up and eating lunch    Allergies:  Cefepime, Bactrim [sulfamethoxazole-trimethoprim], Vancomycin, Zolpidem, and Metronidazole    Home Meds:  Medications Prior to Admission   Medication Sig Dispense Refill Last Dose    ascorbic acid (VITAMIN C) 1000 MG tablet Take 1 tablet by mouth Daily. 30 tablet 3     bumetanide (BUMEX) 1 MG tablet Take 1 tablet by mouth 2 (Two) Times a Day for 30 days. 60 tablet 0     busPIRone (BUSPAR) 10 MG tablet Take 1 tablet by mouth 2 (Two) Times a Day.       calcitriol (ROCALTROL) 0.5 MCG capsule Take 1 capsule by mouth Daily. 90 capsule 4     carvedilol (COREG) 3.125 MG tablet Take 1 tablet twice a day by oral route. 60 tablet 4     donepezil (ARICEPT) 10 MG tablet Take 1 tablet by mouth Daily. 90 tablet 1     DULoxetine (CYMBALTA) 60 MG capsule Take 1 capsule by mouth Daily. 90 capsule 4     famotidine (PEPCID) 20 MG tablet Take 1 tablet twice a day by oral route. 180 tablet 4     Insulin Glargine (Lantus SoloStar) 100 UNIT/ML injection pen Inject 20 Units under the skin into the appropriate area as directed every night at bedtime. 15 mL 3     Insulin Regular Human, Conc, (HumuLIN R) 500 UNIT/ML solution pen-injector  CONCENTRATED injection Inject 40 Units under the skin into the appropriate area as directed 2 (Two) Times a Day Before Meals. Inject 40 units under the skin in the the appropriate area with regular meals AND 40 units with large meals.       Iron-Vitamin C (Vitron-C)  MG tablet Take 1 tablet twice a day by oral route. 60 tablet 2     levocetirizine (XYZAL) 5 MG tablet Take 1 tablet by mouth every day at bedtime. 30 tablet 2     melatonin 3 MG tablet Take 2 tablets by mouth At Night As Needed for Sleep. 30 tablet 0     oxyCODONE (ROXICODONE) 10 MG tablet Take 1 tablet by mouth 2 (Two) Times a Day As Needed. Must last 30 days per md. 55 tablet 0     pantoprazole (PROTONIX) 40 MG EC tablet Take 1 tablet by mouth Every 12 (Twelve) Hours. 180 tablet 4     pregabalin (LYRICA) 100 MG capsule Take 1 capsule by mouth Daily.       pregabalin (LYRICA) 100 MG capsule Take 2 capsules by mouth every night at bedtime.       rosuvastatin (CRESTOR) 10 MG tablet Take 1 tablet by mouth Every Night. 30 tablet 2     sucralfate (CARAFATE) 1 g tablet Take 1 tablet by mouth 4 (Four) Times a Day before meals. 360 tablet 1     Diclofenac Sodium (VOLTAREN) 1 % gel gel Apply 2 g topically to the appropriate area as directed 4 (Four) Times a Day As Needed. 300 g 11     nitroglycerin (NITROSTAT) 0.4 MG SL tablet Place 1 tablet under the tongue Every 5 (Five) Minutes As Needed for Chest Pain. Take no more than 3 doses in 15 minutes.       polyethylene glycol (MIRALAX) 17 GM/SCOOP powder Take 17 g by mouth Daily As Needed (constipation).       sennosides-docusate (PERICOLACE) 8.6-50 MG per tablet Take 1 tablet by mouth Every Night. Obtain OTC          Medicines:  Current Facility-Administered Medications   Medication Dose Route Frequency Provider Last Rate Last Admin    ascorbic acid (VITAMIN C) tablet 1,000 mg  1,000 mg Oral Daily Reuben Garza APRN   1,000 mg at 08/03/24 0823    sennosides-docusate (PERICOLACE) 8.6-50 MG per tablet 2  tablet  2 tablet Oral BID Lamine Figueroa APRN   2 tablet at 08/03/24 0818    And    polyethylene glycol (MIRALAX) packet 17 g  17 g Oral Daily PRN Lamine Figueroa APRN        And    bisacodyl (DULCOLAX) EC tablet 5 mg  5 mg Oral Daily PRN Lamine Figueroa APRN        And    bisacodyl (DULCOLAX) suppository 10 mg  10 mg Rectal Daily PRN Lamine Figueroa APRN   10 mg at 08/02/24 0941    Calcium Replacement - Follow Nurse / BPA Driven Protocol   Does not apply PRN Abebe Garzon DO        dextrose (D50W) (25 g/50 mL) IV injection 10-50 mL  10-50 mL Intravenous Q15 Min PRN Abebe Garzon DO        dextrose (D50W) (25 g/50 mL) IV injection 25 g  25 g Intravenous Q15 Min PRN Lamine Figueroa APRN        dextrose (GLUTOSE) oral gel 15 g  15 g Oral Q15 Min PRN Lamine Figueroa APRN        donepezil (ARICEPT) tablet 10 mg  10 mg Oral Daily Reuben Garza APRN   10 mg at 08/03/24 0823    DULoxetine (CYMBALTA) DR capsule 60 mg  60 mg Oral Daily Reuben Garza APRN   60 mg at 08/03/24 0822    Enoxaparin Sodium (LOVENOX) syringe 135 mg  1 mg/kg Subcutaneous Q12H Abebe Garzon DO   135 mg at 08/03/24 0836    famotidine (PEPCID) tablet 20 mg  20 mg Oral Q12H Reuben Garza APRN   20 mg at 08/03/24 0837    ferric gluconate (FERRLECIT) 250 mg in sodium chloride 0.9 % 250 mL IVPB  250 mg Intravenous Daily Brian Lomas  mL/hr at 08/03/24 0833 250 mg at 08/03/24 0833    glucagon (GLUCAGEN) injection 1 mg  1 mg Intramuscular Q15 Min PRN Lamine Figueroa APRN        insulin glargine (LANTUS, SEMGLEE) injection 10 Units  10 Units Subcutaneous Daily Keren Jamison MD   10 Units at 08/03/24 0819    insulin regular (humuLIN R,novoLIN R) injection 3-14 Units  3-14 Units Subcutaneous 4x Daily AC & at Bedtime Keren Jamison MD   3 Units at 08/02/24 2104    Magnesium Standard Dose Replacement - Follow Nurse / BPA Driven Protocol   Does not apply TONNYN Abebe Garzon DO        melatonin tablet 2.5 mg  2.5  mg Oral Nightly Reuben Garza APRN   2.5 mg at 08/02/24 2103    nitroglycerin (NITROSTAT) SL tablet 0.4 mg  0.4 mg Sublingual Q5 Min PRN Lamine Figueroa APRN        oxyCODONE (ROXICODONE) immediate release tablet 10 mg  10 mg Oral BID Lamine Figueroa APRN   10 mg at 08/03/24 0819    pantoprazole (PROTONIX) EC tablet 40 mg  40 mg Oral Q12H Reuben Garza APRN   40 mg at 08/03/24 0818    Pharmacy to Dose enoxaparin (LOVENOX)   Does not apply Continuous PRN Abebe Garzon DO        Phosphorus Replacement - Follow Nurse / BPA Driven Protocol   Does not apply PRN Abebe Garzon DO        polyethylene glycol (MIRALAX) packet 17 g  17 g Oral Daily Reuben Garza APRN   17 g at 08/03/24 0818    Potassium Replacement - Follow Nurse / BPA Driven Protocol   Does not apply PRN Abebe Garzon DO        pregabalin (LYRICA) capsule 100 mg  100 mg Oral Nightly Reuben Garza APRN   100 mg at 08/02/24 2053    pregabalin (LYRICA) capsule 50 mg  50 mg Oral Daily Reuben aGrza APRN   50 mg at 08/03/24 0818    rosuvastatin (CRESTOR) tablet 10 mg  10 mg Oral Nightly Reuben Garza APRN   10 mg at 08/02/24 2051    sodium bicarbonate tablet 650 mg  650 mg Oral TID Brian Lomas MD   650 mg at 08/03/24 0822    sodium chloride 0.9 % flush 10 mL  10 mL Intravenous Q12H Abebe Garzon,    10 mL at 08/03/24 0839    sodium chloride 0.9 % flush 10 mL  10 mL Intravenous PRN Abebe Garzon DO        sodium chloride 0.9 % flush 10 mL  10 mL Intravenous Q12H Lamine Figueroa APRN   10 mL at 08/03/24 0824    sodium chloride 0.9 % flush 10 mL  10 mL Intravenous PRN Lamine Figueroa APRN        sodium chloride 0.9 % infusion 40 mL  40 mL Intravenous PRN Duran, Ali Kristina, DO        sodium chloride 0.9 % infusion 40 mL  40 mL Intravenous PRN Lamine Figueroa APRN        sodium chloride 0.9 % infusion  50 mL/hr Intravenous Continuous Brian Lomas MD 50 mL/hr at 08/02/24 1702 50 mL/hr at 08/02/24 1702     sodium hypochlorite (DAKIN'S 1/4 STRENGTH) 0.125 % topical solution 0.125% solution   Topical Q12H Aby High APRN   Given at 08/02/24 2049    vancomycin (VANCOCIN) 1,000 mg in sodium chloride 0.9 % 250 mL IVPB-VTB  1,000 mg Intravenous Q24H Julia Adrian  mL/hr at 08/02/24 2050 1,000 mg at 08/02/24 2050       Past Medical History:  Past Medical History:   Diagnosis Date    Arthritis     Autonomic disease     CAD (coronary artery disease) 02/06/2017    Cervical radiculopathy 09/16/2021    Chronic constipation with acute exaccerbation 05/10/2021    Coronary artery disease     Degeneration of cervical intervertebral disc 08/11/2021    Diabetes mellitus     Diabetic foot ulcer 08/31/2020    Diabetic polyneuropathy associated with type 2 diabetes mellitus 01/18/2021    Elevated cholesterol     Gastroesophageal reflux disease 05/13/2019    Headache     HTN (hypertension), benign 05/03/2017    Hyperlipidemia     Hypertension     Mixed hyperlipidemia 02/07/2017    Multiple lung nodules 01/26/2020    5mm, 9 mm RLL identified 1/2020, not present 10/2019.    Myocardial infarction     Osteomyelitis 01/22/2020    Osteomyelitis of fifth toe of right foot 10/07/2019    Pancreatitis     Persistent insomnia 01/20/2020    Renal disorder     Sleep apnea 02/06/2017    Sleep apnea with use of continuous positive airway pressure (CPAP)     NON-COMPLIANT    Slow transit constipation 01/16/2019    Spinal stenosis in cervical region 09/16/2021    Vitamin D deficiency 03/02/2021       Past Surgical History:  Past Surgical History:   Procedure Laterality Date    ABDOMINAL SURGERY      AMPUTATION FOOT / TOE Left 10/2021    5th digit     ANTERIOR CERVICAL DISCECTOMY W/ FUSION N/A 08/05/2022    Procedure: CERVICAL DISCECTOMY ANTERIOR WITH FUSION C5-6 with possible plating of C5-7 with neuromonitoring and 1 c-arm;  Surgeon: Karel Soliz MD;  Location: [facility];  Service: Neurosurgery;  Laterality: N/A;     APPENDECTOMY      BACK SURGERY      CARDIAC CATHETERIZATION Left 02/08/2021    Procedure: Left Heart Cath w poss intervention left anatomical snuff box acess;  Surgeon: Omkar Charles DO;  Location: [facility] CATH INVASIVE LOCATION;  Service: Cardiology;  Laterality: Left;    CARDIAC SURGERY      CATARACT EXTRACTION      CERVICAL SPINE SURGERY      COLONOSCOPY N/A 01/31/2017    Normal exam repeat in 5 years    COLONOSCOPY N/A 02/11/2019    Mild acute inflammation    COLONOSCOPY N/A 04/07/2024    2 areas at 10 and 20 cm with friability ulceration 2 clips placed at 20 cm and 4 clips at 10 cm poor prep normal mucosa,mild eroisions and ulcerations in visible vessels    COLONOSCOPY N/A 7/1/2024    Procedure: COLONOSCOPY WITH ANESTHESIA;  Surgeon: Arsalan Lorenzo DO;  Location: [facility] ENDOSCOPY;  Service: Gastroenterology;  Laterality: N/A;  pre op constipation/diarrhea  post poor prep  pcp Del Shetty    COLONOSCOPY N/A 7/2/2024    Procedure: COLONOSCOPY WITH ANESTHESIA;  Surgeon: Agapito Christopher MD;  Location: [facility] ENDOSCOPY;  Service: Gastroenterology;  Laterality: N/A;  pre rectal bleeding  post poor prep  pcp Del Shetty MD    COLONOSCOPY W/ POLYPECTOMY  03/04/2014    Hyperplastic polyp    CORONARY ARTERY BYPASS GRAFT  10/2015    ENDOSCOPY  04/13/2011    Gastritis with hemorrhage    ENDOSCOPY N/A 05/05/2017    Normal exam    ENDOSCOPY N/A 02/11/2019    Gastritis    ENDOSCOPY N/A 09/01/2020    Non-erosive gastritis with hemorrhage    ENDOSCOPY N/A 02/10/2021    Esophagitis    ENDOSCOPY N/A 04/11/2024    There were esophageal mucosal changes suspicious for short-segment Low's esophagus present in the distal esophagus. The maximum longitudinal extent of these mucosal changes was 2 cm in length. Mucosa was biopsied with a cold forceps for histologyDistal esophagus, biopsies: Mild chronic active esophagogastritis. No evidence of intestinal metaplasia, dysplasia. Antrum, bx, Mild chronic  gastritis    FOOT SURGERY Left     INCISION AND DRAINAGE OF WOUND Left 2007    spider bite       Family History  Family History   Problem Relation Age of Onset    Colon cancer Father     Heart disease Father     Colon cancer Sister     Colon polyps Sister     Alzheimer's disease Mother     Coronary artery disease Sister     Coronary artery disease Sister        Social History  Social History     Socioeconomic History    Marital status:    Tobacco Use    Smoking status: Former     Current packs/day: 0.00     Types: Cigarettes     Quit date:      Years since quittin.6    Smokeless tobacco: Never    Tobacco comments:     smoked in highschool   Vaping Use    Vaping status: Never Used   Substance and Sexual Activity    Alcohol use: No    Drug use: No    Sexual activity: Defer       Review of Systems:  History obtained from chart review and the patient  General ROS: No fever or chills  Respiratory ROS: No cough, shortness of breath, wheezing  Cardiovascular ROS: No chest pain or palpitations  Gastrointestinal ROS: No abdominal pain or melena  Genito-Urinary ROS: No dysuria or hematuria  Psych ROS: No anxiety and depression  14 point ROS reviewed with the patient and negative except as noted above and in the HPI unless unable to obtain.    Objective:  Patient Vitals for the past 24 hrs:   BP Temp Temp src Pulse Resp SpO2 Weight   24 1205 134/79 97.3 °F (36.3 °C) Oral 78 16 100 % --   24 0743 141/89 97.3 °F (36.3 °C) Oral 82 16 100 % --   24 0522 -- -- -- -- -- -- 126 kg (276 lb 12.8 oz)   24 0333 115/57 97.6 °F (36.4 °C) Oral 79 16 97 % --   24 2356 116/56 97.8 °F (36.6 °C) Oral 77 16 97 % --   24 2100 130/60 97.3 °F (36.3 °C) Oral 81 15 98 % --   24 1456 141/75 97.5 °F (36.4 °C) Axillary 87 16 99 % --       Intake/Output Summary (Last 24 hours) at 8/3/2024 1334  Last data filed at 8/3/2024 1205  Gross per 24 hour   Intake 480 ml   Output 1575 ml   Net -1095 ml      General: awake/alert   HEENT: Normocephalic atraumatic head  Neck: Supple with no JVD or carotid bruits.  Chest:  clear to auscultation bilaterally without respiratory distress  CVS: regular rate and rhythm  Abdominal: soft, nontender, positive bowel sounds  Extremities: Left foot ulcer at the plantar aspect  Skin:  left foot ulcer and warm and dry without rash      Labs:  Results from last 7 days   Lab Units 08/03/24  0605 08/02/24 0558 08/01/24 0419   WBC 10*3/mm3 5.75 9.76 12.83*   HEMOGLOBIN g/dL 10.3* 10.0* 9.0*   HEMATOCRIT % 33.2* 31.8* 28.3*   PLATELETS 10*3/mm3 215 219 176         Results from last 7 days   Lab Units 08/03/24  0605 08/02/24  0558 08/01/24 0419 07/31/24  2030 07/31/24  1849   SODIUM mmol/L 143 141 138   < > 132*   POTASSIUM mmol/L 3.8 3.8 4.1   < > 4.5   CHLORIDE mmol/L 110* 108* 103   < > 97*   CO2 mmol/L 19.0* 19.0* 21.0*   < > 19.0*   BUN mg/dL 31* 37* 50*   < > 55*   CREATININE mg/dL 1.82* 1.98* 2.28*   < > 2.67*   CALCIUM mg/dL 8.3* 8.5* 8.8   < > 9.0   EGFR mL/min/1.73 40.0* 36.1* 30.5*   < > 25.2*   BILIRUBIN mg/dL 0.3  --   --   --  0.6   ALK PHOS U/L 88  --   --   --  101   ALT (SGPT) U/L 7  --   --   --  8   AST (SGOT) U/L 12  --   --   --  11   GLUCOSE mg/dL 125* 157* 370*   < > 704*    < > = values in this interval not displayed.       Radiology:   Imaging Results (Last 72 Hours)       Procedure Component Value Units Date/Time    US Renal Bilateral [[id number]] Collected: 08/01/24 1300     Updated: 08/01/24 1305    Narrative:      US RENAL BILATERAL- 8/1/2024 11:19 AM     HISTORY: Acute kidney injury; E11.628-Type 2 diabetes mellitus with  other skin complications; L08.9-Local infection of the skin and  subcutaneous tissue, unspecified; M86.9-Osteomyelitis, unspecified;  E11.10-Type 2 diabetes mellitus with ketoacidosis without coma;  R79.89-Other specified abnormal findings of blood chemistry;  I48.91-Unspecified atrial fibrillation; R41.0-Disorientation,  unspecified      COMPARISON: None available.       TECHNIQUE: Multiple longitudinal and transverse real-time sonographic  images of the kidneys and urinary bladder are obtained. Report and  images stored per institutional and state regulations.           FINDINGS:     Visualized proximal abdominal aorta and IVC are unremarkable.        RIGHT KIDNEY: Right kidney is 10.7 cm in length. Renal cortex is  unremarkable. There is no hydronephrosis. There is an exophytic anechoic  simple right renal cyst measuring 4.9 cm..     LEFT KIDNEY: Left kidney is 11.3 cm in length. Renal cortex is  unremarkable. There is no left hydronephrosis.     PELVIS: Urinary bladder is unremarkable.          Impression:         1. Simple right renal cyst.  2. Otherwise unremarkable kidneys and no hydronephrosis.           This report was signed and finalized on 8/1/2024 1:02 PM by Eran Christina.       CT Abdomen Pelvis With Contrast [[id number]] Collected: 08/01/24 0543     Updated: 08/01/24 0754    Narrative:      EXAMINATION: CT ABDOMEN PELVIS W CONTRAST-      7/31/2024 10:41 PM     HISTORY: abdominal distention with pain; M86.9-Osteomyelitis,  unspecified; E11.10-Type 2 diabetes mellitus with ketoacidosis without  coma; R79.89-Other specified abnormal findings of blood chemistry;  I48.91-Unspecified atrial fibrillation; R41.0-Disorientation,  unspecified     In order to have a CT radiation dose as low as reasonably achievable  Automated Exposure Control was utilized for adjustment of the mA and/or  KV according to patient size.     Total DLP = 1841.72 mGy.cm     The CT scan of the abdomen and pelvis is performed after intravenous  contrast enhancement.     The images are acquired in axial plane and subsequent reconstruction in  coronal and sagittal planes.     Comparison is made with the previous study dated 6/27/2024.     The lung bases included in the study show a trace right and small left  basal pleural effusion. There are mild atelectatic changes  at bilateral  bases left more than the right.     Limited visualized cardiomediastinal structures show atheromatous  changes of the coronary arteries. There is moderate cardiomegaly.     The liver and spleen are normal.     The gallbladder is surgically absent.     Fatty infiltrated pancreas seen. No focal abnormality. No ductal  dilatation. The adrenal glands are normal.     There is persistent bilateral significant perinephric fat infiltration  and thickening of the pararenal fascia which is similar to the previous  study. Bilateral nephrogram is normal and symmetrical. No calculi. No  hydronephrosis. There is a well-defined sharply marginated low density  exophytic mass from the lower pole of the right kidney measuring 4.4 cm  in diameter. CT density suggest a cyst. Limited visualized ureters are  normal and nondilated. The urinary bladder is well distended. No  intrinsic abnormality.     Prostate is not significantly enlarged.     There are small fat-containing inguinal hernias, right larger than the  left.     There is subcutaneous fat infiltration of the entire abdominal wall  extending into the lower extremity. This may represent a fluid  overload?.     The stomach is decompressed with moderate wall thickening. No focal  abnormality Alamast. Duodenum is normal. Small bowel is nondistended and  nondilated. Appendix is surgically absent. There is significant large  volume stool throughout the colon. No finding to suggest obstruction.     Atheromatous changes of the abdominal aorta and iliac arteries. No  aneurysmal dilatation.     Moderately prominent nonspecific retroperitoneal para-aortic lymph nodes  predominantly in the mid abdomen. A referenced left para iliac lymph  node, image #57 in series 2 and image #40 and series 3, measures 1.6 cm  in short axis. There is a large lymph node in the left lower anterior  pelvis/external iliac group measuring 1.3 cm in short axis. There are  moderately prominent  inguinal lymph nodes. A left inguinal lymph node  measures 2 cm in short axis.     Images reviewed in bone window show chronic degenerative changes of the  lumbar spine. No acute bony abnormality.       Impression:      1. A significant perinephric fat stranding is similar to the previous  study and is a nonspecific finding. Possibility for chronic inflammatory  process/chronic pyelonephritis may not be excluded. Renal functions are  normal and symmetrical.  2. Fatty infiltration of the pancreas. No mass. No ductal dilatation.  3. Nonspecific abdominal, pelvic and inguinal lymphadenopathy. The  etiology and clinical significance is not certain. This appears  moderately more progressive since the previous study.  4. Diffuse subcutaneous fat infiltration of the abdominal wall extending  into the lower extremity may represent fluid overload?.  5. A significant large volume of stool in the colon without evidence of  obstruction. This may represent constipation.     The above study was initially reviewed and reported by [facility]. I do not  find any discrepancies.                                This report was signed and finalized on 8/1/2024 7:50 AM by Dr. Carlos Cutler MD.       CT Head Without Contrast [[id number]] Collected: 08/01/24 0524     Updated: 08/01/24 0531    Narrative:      EXAMINATION: CT HEAD WO CONTRAST-      7/31/2024 10:41 PM     HISTORY: altered mental status; M86.9-Osteomyelitis, unspecified;  E11.10-Type 2 diabetes mellitus with ketoacidosis without coma;  R79.89-Other specified abnormal findings of blood chemistry;  I48.91-Unspecified atrial fibrillation; R41.0-Disorientation,  unspecified     In order to have a CT radiation dose as low as reasonably achievable  Automated Exposure Control was utilized for adjustment of the mA and/or  KV according to patient size.     Total DLP = 783.72 mGy.cm     The CT scan of the head is performed without intravenous contrast  enhancement.     The images are  acquired in axial plane and subsequent reconstruction  with coronal and sagittal planes.     Comparison is made with the previous study dated 5/3/2024.     There is no evidence of a mass. There is no midline shift.     There is no evidence of intracranial hemorrhage or hematoma.     Moderately dilated ventricles, basal cisterns and the cortical sulci are  similar to the previous study representing chronic volume loss.     Areas of chronic white matter ischemia bilaterally are noted. The  gray-white matter differentiation is maintained.     Posterior fossa structures are normal.     The images reviewed in bone window show no acute displaced skull  fracture. A subtle nondisplaced fracture or lesion may be obscured due  to motion artifacts. Large mucous retention cyst is seen in the right  maxillary antrum. The remaining paranasal sinuses and mastoid air cells  are clear.       Impression:      1. No acute intracranial abnormality.  2. Chronic ischemic and atrophic changes.  3. Chronic maxillary sinusitis.     The above study was initially reviewed and reported by [facility]. I do not  find any discrepancies.                                      This report was signed and finalized on 8/1/2024 5:27 AM by Dr. Carlos Cutler MD.       XR Foot 3+ View Left [[id number]] Collected: 07/31/24 1941     Updated: 07/31/24 1950    Narrative:      EXAMINATION:  XR FOOT 3+ VW LEFT-  7/31/2024 6:22 PM     HISTORY: Heel wound.     COMPARISON: 3/26/2024.     TECHNIQUE: 3 views were obtained.     FINDINGS: There is a deep ulcer on the sole of the foot posteriorly. The  ulcer appears to be packed with some type of radiopaque material. On the  lateral image, there may be a small area of bony erosion involving the  calcaneus just posterior to the plantar calcaneal spur. A small area of  osteomyelitis is not ruled out. There has been prior amputation of the  fifth toe and distal fifth metatarsal. There may have been prior  resection of the  distal fourth metacarpal and a portion of the proximal  phalanx of the fourth toe versus chronic erosive change. The appearance  is stable. There is severe narrowing of the first MTP joint. There is  narrowing of some of the interphalangeal joints. There is some spurring  in the tarsal region and at the tarsal-metatarsal junction. There is  soft tissue swelling of the foot diffusely. There is a small curvilinear  foreign body in the soft tissues along the sole of the foot in the  second toe region in the proximal phalanx area. There is another small  linear foreign body in the soft tissues adjacent to the distal phalanx  of the great toe.          Impression:      1. Deep ulcer on the sole of the foot posteriorly near the calcaneus.  There is a questionable small area of bony erosion along the plantar  surface of the calcaneus just proximal to the plantar calcaneal spur.  Osteomyelitis cannot be ruled out. The soft tissue ulcer is packed with  some type of radiopaque material.  2. Linear foreign bodies projected over the soft tissues of the second  toe and first toe. Artifacts are also included in the differential.  3. Other chronic changes, as discussed.        This report was signed and finalized on 7/31/2024 7:47 PM by Dr. Raz Mendes MD.       XR Chest 1 View [[id number]] Collected: 07/31/24 1940     Updated: 07/31/24 1944    Narrative:      EXAMINATION:  XR CHEST 1 VW-  7/31/2024 6:22 PM     HISTORY: Altered mental status. Hypertension and diabetes.     COMPARISON: 6/28/2024.     TECHNIQUE: Single view AP image.     FINDINGS: There is hypoventilation with vascular crowding. There is mild  bronchial wall thickening, stable. There is no dense infiltrate or  effusion. Heart size is borderline. Prior heart bypass surgery. Prior  cervical fusion. No definite acute bony abnormality.          Impression:      1. Hypoventilation with vascular crowding.  2. Mild bronchial wall thickening, stable.           This  "report was signed and finalized on 7/31/2024 7:41 PM by Dr. Raz Mendes MD.               Culture:  Blood Culture   Date Value Ref Range Status   07/31/2024 No growth at 24 hours  Preliminary   07/31/2024 Staphylococcus, coagulase negative (C)  Final         Assessment    Acute kidney injury/improving  Acute tubular necrosis.  Stage III CKD baseline  Type II diabetic nephropathy  Benign essential hypertension  Sepsis related to left diabetic foot ulcer  Anemia of CKD  Obesity     Plan:  IV fluid to KVO.  IV antibiotic  Intravenous ferric gluconate  P.o. calcitriol.      Brian Lomas MD  8/3/2024  13:34 CDT      Electronically signed by Brian Lomas MD at 08/03/24 1337       Daniel Hall MD at 08/03/24 0908          Infectious Diseases Progress Note    Patient:  Erick Luong  YOB: 1955  MRN: [id number]   Admit date: 7/31/2024   Admitting Physician: Payam Keyes DO  Primary Care Physician: Del Shetty MD    Chief Complaint/Interval History: He indicates he is feeling better overall.  He has no new symptoms.  The abdominal symptoms he had previously have shown improvement.  He is without fever.  He is hemodynamically stable.  He has excellent oxygen saturation on room air.    Intake/Output Summary (Last 24 hours) at 8/3/2024 0908  Last data filed at 8/3/2024 0537  Gross per 24 hour   Intake 240 ml   Output 1300 ml   Net -1060 ml     Allergies:   Allergies   Allergen Reactions    Cefepime Hives and Anaphylaxis    Bactrim [Sulfamethoxazole-Trimethoprim] Other (See Comments)     \"RENAL FAILURE\"    Vancomycin Itching    Zolpidem Mental Status Change     \"makes him crazy\"    Metronidazole Rash     Current Scheduled Medications:   ascorbic acid, 1,000 mg, Oral, Daily  donepezil, 10 mg, Oral, Daily  DULoxetine, 60 mg, Oral, Daily  enoxaparin, 1 mg/kg, Subcutaneous, Q12H  famotidine, 20 mg, Oral, Q12H  ferric gluconate, 250 mg, Intravenous, Daily  insulin glargine, 10 Units, Subcutaneous, " "Daily  insulin regular, 3-14 Units, Subcutaneous, 4x Daily AC & at Bedtime  melatonin, 2.5 mg, Oral, Nightly  oxyCODONE, 10 mg, Oral, BID  pantoprazole, 40 mg, Oral, Q12H  polyethylene glycol, 17 g, Oral, Daily  pregabalin, 100 mg, Oral, Nightly  pregabalin, 50 mg, Oral, Daily  rosuvastatin, 10 mg, Oral, Nightly  senna-docusate sodium, 2 tablet, Oral, BID  sodium bicarbonate, 650 mg, Oral, TID  sodium chloride, 10 mL, Intravenous, Q12H  sodium chloride, 10 mL, Intravenous, Q12H  sodium hypochlorite, , Topical, Q12H  vancomycin, 1,000 mg, Intravenous, Q24H      Pharmacy to Dose enoxaparin (LOVENOX),   sodium chloride, 50 mL/hr, Last Rate: 50 mL/hr (24 1702)       Current PRN Medications:    senna-docusate sodium **AND** polyethylene glycol **AND** bisacodyl **AND** bisacodyl    Calcium Replacement - Follow Nurse / BPA Driven Protocol    dextrose    dextrose    dextrose    glucagon (human recombinant)    Magnesium Standard Dose Replacement - Follow Nurse / BPA Driven Protocol    nitroglycerin    Pharmacy to Dose enoxaparin (LOVENOX)    Phosphorus Replacement - Follow Nurse / BPA Driven Protocol    Potassium Replacement - Follow Nurse / BPA Driven Protocol    sodium chloride    sodium chloride    sodium chloride    sodium chloride    Review of Systems see HPI    Vital Signs:  Temp (24hrs), Av.5 °F (36.4 °C), Min:97.3 °F (36.3 °C), Max:97.8 °F (36.6 °C)    /89 (BP Location: Right arm, Patient Position: Sitting)   Pulse 82   Temp 97.3 °F (36.3 °C) (Oral)   Resp 16   Ht 182.9 cm (72\")   Wt 126 kg (276 lb 12.8 oz)   SpO2 100%   BMI 37.54 kg/m²     Physical Exam  Vital signs - reviewed.  Line/IV site - No erythema, warmth, induration, or tenderness.  Abdomen soft and nontender.  No guarding or rebound.  Lungs without crackles    Left lateral foot postdebridement:      Lab Results:  CBC:   Results from last 7 days   Lab Units 24  0605 24  0558 24  0419 24  1849   WBC 10*3/mm3 " 5.75 9.76 12.83* 15.48*   HEMOGLOBIN g/dL 10.3* 10.0* 9.0* 9.8*   HEMATOCRIT % 33.2* 31.8* 28.3* 29.6*   PLATELETS 10*3/mm3 215 219 176 201     BMP:  Results from last 7 days   Lab Units 08/03/24  0605 08/02/24  0558 08/01/24  0419 08/01/24  0210 08/01/24  0021 07/31/24  2030 07/31/24  1849   SODIUM mmol/L 143 141 138 136 138 133* 132*   POTASSIUM mmol/L 3.8 3.8 4.1 4.0 4.1 4.4 4.5   CHLORIDE mmol/L 110* 108* 103 103 103 98 97*   CO2 mmol/L 19.0* 19.0* 21.0* 19.0* 21.0* 20.0* 19.0*   BUN mg/dL 31* 37* 50* 51* 52* 54* 55*   CREATININE mg/dL 1.82* 1.98* 2.28* 2.39* 2.46* 2.64* 2.67*   GLUCOSE mg/dL 125* 157* 370* 379* 370* 653* 704*   CALCIUM mg/dL 8.3* 8.5* 8.8 8.7 8.8 8.9 9.0   ALT (SGPT) U/L 7  --   --   --   --   --  8     Urinalysis profile:            Component  Ref Range & Units 3 d ago  (7/31/24) 1 mo ago  (6/29/24) 1 mo ago  (6/27/24) 2 mo ago  (5/20/24) 3 mo ago  (5/3/24) 3 mo ago  (4/15/24) 4 mo ago  (3/26/24)   RBC, UA  None Seen, 0-2 /HPF 3-5 Abnormal  None Seen Too Numerous to Count Abnormal  6-10 Abnormal  3-5 Abnormal  0-2 0-2   WBC, UA  None Seen, 0-2 /HPF 6-10 Abnormal  3-5 Abnormal  CM Too Numerous to Count Abnormal  Too Numerous to Count Abnormal  6-10 Abnormal  21-50 Abnormal  0-2 CM   Bacteria, UA  None Seen /HPF 2+ Abnormal  None Seen 4+ Abnormal  4+ Abnormal  None Seen None Seen Trace Abnormal    Squamous Epithelial Cells, UA  None Seen, 0-2 /HPF 0-2 0-2 0-2 3-6 Abnormal  7-12 Abnormal  3-6 Abnormal  3-6 Abnormal    Hyaline Casts, UA  None Seen /LPF None Seen None Seen None Seen None Seen 3-6 0-2 0-2   Methodology Manual Light Microscopy Automated Microscopy Automated Microscopy Automated Microscopy Automated Microscopy Manual Light Microscopy Manual Light Microscopy        Culture Results:   Blood Culture   Date Value Ref Range Status   07/31/2024 No growth at 2 days  Preliminary   07/31/2024 Staphylococcus, coagulase negative (C)  Final   (With regard to the above blood cultures-1 of 4 blood  cultures bottles positive for coagulation negative staph-likely contaminant)    Urine Culture   Date Value Ref Range Status   07/31/2024 >100,000 CFU/mL Escherichia coli (A)  Preliminary   (Susceptibility pending)    Wound culture obtained August 2, 2024 labeled drainage left foot:  Rare white blood cells, no organisms, culture pending    Radiology:     CT abdomen and pelvis July 31, 2024:  IMPRESSION:  1. A significant perinephric fat stranding is similar to the previous  study and is a nonspecific finding. Possibility for chronic inflammatory  process/chronic pyelonephritis may not be excluded. Renal functions are  normal and symmetrical.  2. Fatty infiltration of the pancreas. No mass. No ductal dilatation.  3. Nonspecific abdominal, pelvic and inguinal lymphadenopathy. The  etiology and clinical significance is not certain. This appears  moderately more progressive since the previous study.  4. Diffuse subcutaneous fat infiltration of the abdominal wall extending  into the lower extremity may represent fluid overload?.  5. A significant large volume of stool in the colon without evidence of  obstruction. This may represent constipation.     The above study was initially reviewed and reported by [employer]. I do not  find any discrepancies.    Additional Studies Reviewed: None    Impression:   1.  Diabetic foot infection  2.  Uncontrolled diabetes  3.  Abdominal symptoms-have shown some improvement  4.  Chronic kidney disease  5.  Leukocytosis  6.  Positive blood culture-suspect contaminant    Recommendations:   Continue vancomycin  Continue local wound care  No change in antibiotic recommendations at present  Continue to follow    Daniel Brothers MD    Electronically signed by Daniel Brothers MD at 08/03/24 1031       PRN MEDS   8/3  zofran iv x1    8/4 zofran iv x2   8/5 zofran iv x1

## 2024-08-05 NOTE — PLAN OF CARE
Goal Outcome Evaluation:  Plan of Care Reviewed With: patient        Progress: no change  Outcome Evaluation: Pt denies pain and nausea so far this shift, cont. IVF, cont accuchecks, voiding, chase diet

## 2024-08-05 NOTE — THERAPY TREATMENT NOTE
Acute Care - Physical Therapy Treatment Note  [facility]     Patient Name: Erick Luong  : 1955  MRN: [id number]  Today's Date: 2024      Visit Dx:     ICD-10-CM ICD-9-CM   1. Diabetic foot infection  E11.628 250.80    L08.9 686.9   2. Osteomyelitis of foot, unspecified laterality, unspecified type  M86.9 730.27   3. Diabetic ketoacidosis without coma associated with type 2 diabetes mellitus  E11.10 250.12   4. Elevated troponin  R79.89 790.6   5. Atrial fibrillation with RVR  I48.91 427.31   6. Confusion  R41.0 298.9   7. Impaired mobility [Z74.09]  Z74.09 799.89     Patient Active Problem List   Diagnosis    Obesity, unspecified obesity severity, unspecified obesity type    Essential hypertension    Type 2 diabetes mellitus with hyperglycemia, with long-term current use of insulin    Nonsmoker    Anemia due to chronic kidney disease    Class 3 severe obesity due to excess calories with body mass index (BMI) of 40.0 to 44.9 in adult    Anasarca    Sleep apnea with use of continuous positive airway pressure (CPAP)    Medically noncompliant    Diabetic ulcer of left foot associated with type 2 diabetes mellitus    Diabetic polyneuropathy associated with type 2 diabetes mellitus    Spinal stenosis in cervical region    Degeneration of cervical intervertebral disc    Cervical radiculopathy    Degeneration of lumbar or lumbosacral intervertebral disc    Cervical myelopathy    Bilateral carpal tunnel syndrome    CAD (coronary artery disease)    GERD without esophagitis    BPH without obstruction/lower urinary tract symptoms    Stage 3b chronic kidney disease    Chronic diastolic heart failure    Type 2 myocardial infarction due to heart failure    Left carpal tunnel syndrome    Syncope and collapse, recurrent episodes    Poorly-controlled hypertension    Rhinovirus    Peripheral vascular disease    Chronic kidney disease (CKD), stage IV (severe)    Diabetic foot infection    Sepsis    Epigastric pain     Chronic heart failure with preserved ejection fraction (HFpEF)    Sepsis due to methicillin resistant Staphylococcus aureus (MRSA) with encephalopathy without septic shock    Slow transit constipation    CHF exacerbation    CHF (congestive heart failure), NYHA class I, acute on chronic, combined    Rectal bleeding    Acute on chronic heart failure with preserved ejection fraction (HFpEF)    DKA, type 2, not at goal    Atrial fibrillation    E. coli UTI, ESBL     Past Medical History:   Diagnosis Date    Arthritis     Autonomic disease     CAD (coronary artery disease) 02/06/2017    Cervical radiculopathy 09/16/2021    Chronic constipation with acute exaccerbation 05/10/2021    Coronary artery disease     Degeneration of cervical intervertebral disc 08/11/2021    Diabetes mellitus     Diabetic foot ulcer 08/31/2020    Diabetic polyneuropathy associated with type 2 diabetes mellitus 01/18/2021    Elevated cholesterol     Gastroesophageal reflux disease 05/13/2019    Headache     HTN (hypertension), benign 05/03/2017    Hyperlipidemia     Hypertension     Mixed hyperlipidemia 02/07/2017    Multiple lung nodules 01/26/2020    5mm, 9 mm RLL identified 1/2020, not present 10/2019.    Myocardial infarction     Osteomyelitis 01/22/2020    Osteomyelitis of fifth toe of right foot 10/07/2019    Pancreatitis     Persistent insomnia 01/20/2020    Renal disorder     Sleep apnea 02/06/2017    Sleep apnea with use of continuous positive airway pressure (CPAP)     NON-COMPLIANT    Slow transit constipation 01/16/2019    Spinal stenosis in cervical region 09/16/2021    Vitamin D deficiency 03/02/2021     Past Surgical History:   Procedure Laterality Date    ABDOMINAL SURGERY      AMPUTATION FOOT / TOE Left 10/2021    5th digit     ANTERIOR CERVICAL DISCECTOMY W/ FUSION N/A 08/05/2022    Procedure: CERVICAL DISCECTOMY ANTERIOR WITH FUSION C5-6 with possible plating of C5-7 with neuromonitoring and 1 c-arm;  Surgeon: Karel Soliz,  MD;  Location: [facility] OR;  Service: Neurosurgery;  Laterality: N/A;    APPENDECTOMY      BACK SURGERY      CARDIAC CATHETERIZATION Left 02/08/2021    Procedure: Left Heart Cath w poss intervention left anatomical snuff box acess;  Surgeon: Omkar Charles DO;  Location: [facility] CATH INVASIVE LOCATION;  Service: Cardiology;  Laterality: Left;    CARDIAC SURGERY      CATARACT EXTRACTION      CERVICAL SPINE SURGERY      COLONOSCOPY N/A 01/31/2017    Normal exam repeat in 5 years    COLONOSCOPY N/A 02/11/2019    Mild acute inflammation    COLONOSCOPY N/A 04/07/2024    2 areas at 10 and 20 cm with friability ulceration 2 clips placed at 20 cm and 4 clips at 10 cm poor prep normal mucosa,mild eroisions and ulcerations in visible vessels    COLONOSCOPY N/A 7/1/2024    Procedure: COLONOSCOPY WITH ANESTHESIA;  Surgeon: Arsalan Lorenzo DO;  Location: [facility] ENDOSCOPY;  Service: Gastroenterology;  Laterality: N/A;  pre op constipation/diarrhea  post poor prep  pcp Del Shetty    COLONOSCOPY N/A 7/2/2024    Procedure: COLONOSCOPY WITH ANESTHESIA;  Surgeon: Agapito Christopher MD;  Location: [facility] ENDOSCOPY;  Service: Gastroenterology;  Laterality: N/A;  pre rectal bleeding  post poor prep  pcp Del Shetty MD    COLONOSCOPY W/ POLYPECTOMY  03/04/2014    Hyperplastic polyp    CORONARY ARTERY BYPASS GRAFT  10/2015    ENDOSCOPY  04/13/2011    Gastritis with hemorrhage    ENDOSCOPY N/A 05/05/2017    Normal exam    ENDOSCOPY N/A 02/11/2019    Gastritis    ENDOSCOPY N/A 09/01/2020    Non-erosive gastritis with hemorrhage    ENDOSCOPY N/A 02/10/2021    Esophagitis    ENDOSCOPY N/A 04/11/2024    There were esophageal mucosal changes suspicious for short-segment Low's esophagus present in the distal esophagus. The maximum longitudinal extent of these mucosal changes was 2 cm in length. Mucosa was biopsied with a cold forceps for histologyDistal esophagus, biopsies: Mild chronic active esophagogastritis. No  evidence of intestinal metaplasia, dysplasia. Antrum, bx, Mild chronic gastritis    FOOT SURGERY Left     INCISION AND DRAINAGE OF WOUND Left 09/2007    spider bite     PT Assessment (Last 12 Hours)       PT Evaluation and Treatment       Row Name 08/05/24 0900          Physical Therapy Time and Intention    Subjective Information complains of;weakness;fatigue  -LY     Document Type therapy note (daily note)  -LY     Mode of Treatment physical therapy  -LY       Row Name 08/05/24 0900          General Information    Existing Precautions/Restrictions fall;other (see comments)  wound on L heel, surgical shoe  -LY       Row Name 08/05/24 0900          Pain    Pretreatment Pain Rating 0/10 - no pain  -LY     Posttreatment Pain Rating 0/10 - no pain  -LY       Row Name 08/05/24 0900          Bed Mobility    Supine-Sit [place] (Bed Mobility) standby assist  -LY       Row Name 08/05/24 0900          Sit-Stand Transfer    Sit-Stand [place] (Transfers) verbal cues;contact guard  -LY       Row Name 08/05/24 0900          Stand-Sit Transfer    Stand-Sit [place] (Transfers) verbal cues;contact guard  -LY       Row Name 08/05/24 0900          Gait/Stairs (Locomotion)    [place] Level (Gait) verbal cues;contact guard  -LY     Assistive Device (Gait) walker, front-wheeled  -LY     Distance in Feet (Gait) 40  x2  -LY     Deviations/Abnormal Patterns (Gait) gait speed decreased;stride length decreased  -LY     Bilateral Gait Deviations forward flexed posture;heel strike decreased  -LY     Comment, (Gait/Stairs) cues for gait mechanics and safety, assist for donning of L surgical shoe  -LY       Row Name             Wound 08/22/23 0140 Left posterior plantar    Wound - Properties Group Placement Date: 08/22/23  -AM Placement Time: 0140  -AM Present on Original Admission: Y  -AM Side: Left  -AM Orientation: posterior  -AM Location: plantar  -AM    Retired Wound - Properties Group Placement Date: 08/22/23  -AM  Placement Time: 0140  -AM Present on Original Admission: Y  -AM Side: Left  -AM Orientation: posterior  -AM Location: plantar  -AM    Retired Wound - Properties Group Date first assessed: 08/22/23  -AM Time first assessed: 0140  -AM Present on Original Admission: Y  -AM Side: Left  -AM Location: plantar  -AM      Row Name             Wound Left lateral foot Diabetic Ulcer    Wound - Properties Group Side: Left  -MH Orientation: lateral  -MH Location: foot  -JACIEL, 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;diabetic foot ulcer/gangrene  Primary Wound Type: Diabetic ulc  -JACIEL Wound Outcome: Amputation  -JACIEL Stage, Pressure Injury : other (see comments)  -JACIEL, full thickness starting 10/20/21     Retired Wound - Properties Group Side: Left  -MH Orientation: lateral  -MH Location: foot  -JACIEL, 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;diabetic foot ulcer/gangrene  Primary Wound Type: Diabetic ulc  -JACIEL Stage, Pressure Injury : other (see comments)  -JACIEL, full thickness starting 10/20/21  Wound Outcome: Amputation  -JACIEL    Retired Wound - Properties Group Side: Left  -MH Location: foot  -JACIEL, 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;diabetic foot ulcer/gangrene  Primary Wound Type: Diabetic ulc  -JACIEL Wound Outcome: Amputation  -JACIEL      Row Name             Wound 06/15/23 0102 Left anterior plantar    Wound - Properties Group Placement Date: 06/15/23  -SM Placement Time: 0102 -SM Side: Left  -SM Orientation: anterior  -SM Location: plantar  -SM    Retired Wound - Properties Group Placement Date: 06/15/23  -SM Placement Time: 0102  -SM Side: Left  -SM Orientation: anterior  -SM Location: plantar  -SM    Retired Wound - Properties Group Date first assessed: 06/15/23  -SM Time first assessed: 0102  -SM Side: Left  -SM Location: plantar  -SM      Row Name             Wound 04/04/24 2100 Left posterior wrist Skin Tear    Wound - Properties Group Placement Date: 04/04/24  -NR Placement Time: 2100  -NR Present on Original Admission: N  -NR Side: Left  -NR Orientation: posterior  -NR Location:  wrist  -NR Primary Wound Type: Skin tear  -NR Additional Comments: likely caused by wristband. Band removed, site cleaned with alcohol swab, wraped in gauze  -NR    Retired Wound - Properties Group Placement Date: 04/04/24  -NR Placement Time: 2100 -NR Present on Original Admission: N  -NR Side: Left  -NR Orientation: posterior  -NR Location: wrist  -NR Primary Wound Type: Skin tear  -NR Additional Comments: likely caused by wristband. Band removed, site cleaned with alcohol swab, wraped in gauze  -NR    Retired Wound - Properties Group Date first assessed: 04/04/24  -NR Time first assessed: 2100 -NR Present on Original Admission: N  -NR Side: Left  -NR Location: wrist  -NR Primary Wound Type: Skin tear  -NR Additional Comments: likely caused by wristband. Band removed, site cleaned with alcohol swab, wraped in gauze  -NR      Row Name 08/05/24 0900          Plan of Care Review    Plan of Care Reviewed With patient  -LY     Progress improving  -LY       Row Name 08/05/24 0900          Positioning and Restraints    Pre-Treatment Position in bed  -LY     Post Treatment Position bed  -LY     In Bed sitting EOB;call light within reach;encouraged to call for assist  -LY               User Key  (r) = Recorded By, (t) = Taken By, (c) = Cosigned By      Initials Name Provider Type    Yuliana Lisa RN Registered Nurse    MH Haase, Mallory L, RN Registered Nurse    Alesha Armenta, PTA Physical Therapist Assistant    Faviola Kunz RN Registered Nurse    Michelle Diaz RN Registered Nurse    Reuben Johnson RN Registered Nurse                    Physical Therapy Education       Title: PT OT SLP Therapies (In Progress)       Topic: Physical Therapy (Done)       Point: Mobility training (Done)       Learning Progress Summary             Patient Acceptance, E,D, DU,VU by  at 8/4/2024 5430    Comment: benefits of PT and POC, call for A OOB                         Point: Precautions (Done)        Learning Progress Summary             Patient Acceptance, MEDHAT,PRUDENCIO, SAEID,VU by  at 8/4/2024 6213    Comment: benefits of PT and POC, call for A OOB                                         User Key       Initials Effective Dates Name Provider Type Discipline     02/03/23 -  Daniel Garcia, PT Physical Therapist PT                  PT Recommendation and Plan     Plan of Care Reviewed With: patient  Progress: improving   Outcome Measures       Row Name 08/04/24 1500             How much help from another is currently needed...    Putting on and taking off regular lower body clothing? 1  -EC      Bathing (including washing, rinsing, and drying) 2  -EC      Toileting (which includes using toilet bed pan or urinal) 2  -EC      Putting on and taking off regular upper body clothing 3  -EC      Taking care of personal grooming (such as brushing teeth) 3  -EC      Eating meals 4  -EC      AM-PAC 6 Clicks Score (OT) 15  -EC         Functional Assessment    Outcome Measure Options AM-PAC 6 Clicks Daily Activity (OT)  -EC                User Key  (r) = Recorded By, (t) = Taken By, (c) = Cosigned By      Initials Name Provider Type    EC Matilda Bryant, OTR/L Occupational Therapist                     Time Calculation:    PT Charges       Row Name 08/05/24 1018             Time Calculation    Start Time 0900  -LY      Stop Time 0916  -LY      Time Calculation (min) 16 min  -LY      PT Received On 08/05/24  -LY         Time Calculation- PT    Total Timed Code Minutes- PT 16 minute(s)  -LY         Timed Charges    51184 - Gait Training Minutes  16  -LY         Total Minutes    Timed Charges Total Minutes 16  -LY       Total Minutes 16  -LY                User Key  (r) = Recorded By, (t) = Taken By, (c) = Cosigned By      Initials Name Provider Type    lAesha Armenta, PTA Physical Therapist Assistant                  Therapy Charges for Today       Code Description Service Date Service Provider Modifiers Qty    [id number]   GAIT TRAINING EA 15 MIN 8/5/2024 Alesha Dominguez, PTA GP 1            PT G-Codes  Outcome Measure Options: AM-PAC 6 Clicks Daily Activity (OT)  AM-PAC 6 Clicks Score (PT): 17  AM-PAC 6 Clicks Score (OT): 15    Alesha Dominguez PTA  8/5/2024

## 2024-08-05 NOTE — PROGRESS NOTES
[facility] - PODIATRY    Today's Date: 08/05/24     Patient Name: Erick Luong  MRN: [id number]  CSN: [id number]  PCP: Del Shetty MD  Referring Provider: Abebe Garzon DO  Attending Provider: Alejandrina Barnett DO  Length of Stay: 5    SUBJECTIVE   Chief Complaint: Left foot wounds    HPI: Erick Luong, a 69 y.o.male, who is being followed by podiatry for left foot wounds.  Patient denies any significant events over the weekend.  Patient reports his dressings have been changed by nursing over the weekend.  He reports pain of 8/10 to his left foot.  Denies any constitutional symptoms. No other pedal complaints at this time.    Past Medical History:   Diagnosis Date    Arthritis     Autonomic disease     CAD (coronary artery disease) 02/06/2017    Cervical radiculopathy 09/16/2021    Chronic constipation with acute exaccerbation 05/10/2021    Coronary artery disease     Degeneration of cervical intervertebral disc 08/11/2021    Diabetes mellitus     Diabetic foot ulcer 08/31/2020    Diabetic polyneuropathy associated with type 2 diabetes mellitus 01/18/2021    Elevated cholesterol     Gastroesophageal reflux disease 05/13/2019    Headache     HTN (hypertension), benign 05/03/2017    Hyperlipidemia     Hypertension     Mixed hyperlipidemia 02/07/2017    Multiple lung nodules 01/26/2020    5mm, 9 mm RLL identified 1/2020, not present 10/2019.    Myocardial infarction     Osteomyelitis 01/22/2020    Osteomyelitis of fifth toe of right foot 10/07/2019    Pancreatitis     Persistent insomnia 01/20/2020    Renal disorder     Sleep apnea 02/06/2017    Sleep apnea with use of continuous positive airway pressure (CPAP)     NON-COMPLIANT    Slow transit constipation 01/16/2019    Spinal stenosis in cervical region 09/16/2021    Vitamin D deficiency 03/02/2021     Past Surgical History:   Procedure Laterality Date    ABDOMINAL SURGERY      AMPUTATION FOOT / TOE Left 10/2021    5th digit      ANTERIOR CERVICAL DISCECTOMY W/ FUSION N/A 08/05/2022    Procedure: CERVICAL DISCECTOMY ANTERIOR WITH FUSION C5-6 with possible plating of C5-7 with neuromonitoring and 1 c-arm;  Surgeon: Karel Soliz MD;  Location: [facility] OR;  Service: Neurosurgery;  Laterality: N/A;    APPENDECTOMY      BACK SURGERY      CARDIAC CATHETERIZATION Left 02/08/2021    Procedure: Left Heart Cath w poss intervention left anatomical snuff box acess;  Surgeon: Omkar Charles DO;  Location: [facility] CATH INVASIVE LOCATION;  Service: Cardiology;  Laterality: Left;    CARDIAC SURGERY      CATARACT EXTRACTION      CERVICAL SPINE SURGERY      COLONOSCOPY N/A 01/31/2017    Normal exam repeat in 5 years    COLONOSCOPY N/A 02/11/2019    Mild acute inflammation    COLONOSCOPY N/A 04/07/2024    2 areas at 10 and 20 cm with friability ulceration 2 clips placed at 20 cm and 4 clips at 10 cm poor prep normal mucosa,mild eroisions and ulcerations in visible vessels    COLONOSCOPY N/A 7/1/2024    Procedure: COLONOSCOPY WITH ANESTHESIA;  Surgeon: Arsalan Lorenzo DO;  Location: [facility] ENDOSCOPY;  Service: Gastroenterology;  Laterality: N/A;  pre op constipation/diarrhea  post poor prep  pcp Del Shetty    COLONOSCOPY N/A 7/2/2024    Procedure: COLONOSCOPY WITH ANESTHESIA;  Surgeon: Agapito Christopher MD;  Location: [facility] ENDOSCOPY;  Service: Gastroenterology;  Laterality: N/A;  pre rectal bleeding  post poor prep  pcp Del Shetty MD    COLONOSCOPY W/ POLYPECTOMY  03/04/2014    Hyperplastic polyp    CORONARY ARTERY BYPASS GRAFT  10/2015    ENDOSCOPY  04/13/2011    Gastritis with hemorrhage    ENDOSCOPY N/A 05/05/2017    Normal exam    ENDOSCOPY N/A 02/11/2019    Gastritis    ENDOSCOPY N/A 09/01/2020    Non-erosive gastritis with hemorrhage    ENDOSCOPY N/A 02/10/2021    Esophagitis    ENDOSCOPY N/A 04/11/2024    There were esophageal mucosal changes suspicious for short-segment Low's esophagus present in the distal esophagus.  "The maximum longitudinal extent of these mucosal changes was 2 cm in length. Mucosa was biopsied with a cold forceps for histologyDistal esophagus, biopsies: Mild chronic active esophagogastritis. No evidence of intestinal metaplasia, dysplasia. Antrum, bx, Mild chronic gastritis    FOOT SURGERY Left     INCISION AND DRAINAGE OF WOUND Left 2007    spider bite     Family History   Problem Relation Age of Onset    Colon cancer Father     Heart disease Father     Colon cancer Sister     Colon polyps Sister     Alzheimer's disease Mother     Coronary artery disease Sister     Coronary artery disease Sister      Social History     Socioeconomic History    Marital status:    Tobacco Use    Smoking status: Former     Current packs/day: 0.00     Types: Cigarettes     Quit date:      Years since quittin.6    Smokeless tobacco: Never    Tobacco comments:     smoked in [employer]   Vaping Use    Vaping status: Never Used   Substance and Sexual Activity    Alcohol use: No    Drug use: No    Sexual activity: Defer     Allergies   Allergen Reactions    Cefepime Hives and Anaphylaxis    Bactrim [Sulfamethoxazole-Trimethoprim] Other (See Comments)     \"RENAL FAILURE\"    Vancomycin Itching    Zolpidem Mental Status Change     \"makes him crazy\"    Metronidazole Rash     Current Facility-Administered Medications   Medication Dose Route Frequency Provider Last Rate Last Admin    ascorbic acid (VITAMIN C) tablet 1,000 mg  1,000 mg Oral Daily Reuben Garza APRN   1,000 mg at 24 0834    sennosides-docusate (PERICOLACE) 8.6-50 MG per tablet 2 tablet  2 tablet Oral BID Lamine Figueroa APRN   2 tablet at 24 0819    And    polyethylene glycol (MIRALAX) packet 17 g  17 g Oral Daily PRN Lamine Figueroa APRN        And    bisacodyl (DULCOLAX) EC tablet 5 mg  5 mg Oral Daily PRN Lamine Figueroa APRN        And    bisacodyl (DULCOLAX) suppository 10 mg  10 mg Rectal Daily PRN Lamine Figueroa APRN   10 mg at " 08/02/24 0941    Calcium Replacement - Follow Nurse / BPA Driven Protocol   Does not apply PRN Abebe Garzon DO        dextrose (D50W) (25 g/50 mL) IV injection 10-50 mL  10-50 mL Intravenous Q15 Min PRN Abebe Garzon DO        dextrose (D50W) (25 g/50 mL) IV injection 25 g  25 g Intravenous Q15 Min PRN Lamine Figueroa APRN        dextrose (GLUTOSE) oral gel 15 g  15 g Oral Q15 Min PRN Lamine Figueroa APRN        donepezil (ARICEPT) tablet 10 mg  10 mg Oral Daily Reuben Garza APRN   10 mg at 08/05/24 0834    DULoxetine (CYMBALTA) DR capsule 60 mg  60 mg Oral Daily Reuben Garza APRN   60 mg at 08/05/24 0834    Enoxaparin Sodium (LOVENOX) syringe 135 mg  1 mg/kg Subcutaneous Q12H Abebe Garzon DO   135 mg at 08/05/24 0833    ertapenem (INVanz) 1,000 mg in sodium chloride 0.9 % 100 mL MBP  1,000 mg Intravenous Q24H Julia Adrian  mL/hr at 08/04/24 1357 1,000 mg at 08/04/24 1357    famotidine (PEPCID) tablet 20 mg  20 mg Oral Q12H Reuben Garza APRN   20 mg at 08/05/24 0833    ferric gluconate (FERRLECIT) 250 mg in sodium chloride 0.9 % 250 mL IVPB  250 mg Intravenous Daily Brian Lomas  mL/hr at 08/05/24 0833 250 mg at 08/05/24 0833    glucagon (GLUCAGEN) injection 1 mg  1 mg Intramuscular Q15 Min PRN Lamine Figueroa APRN        insulin glargine (LANTUS, SEMGLEE) injection 10 Units  10 Units Subcutaneous Daily Keren Jamison MD   10 Units at 08/04/24 0823    insulin regular (humuLIN R,novoLIN R) injection 3-14 Units  3-14 Units Subcutaneous 4x Daily AC & at Bedtime Keren Jamison MD   3 Units at 08/03/24 2033    Magnesium Standard Dose Replacement - Follow Nurse / BPA Driven Protocol   Does not apply PRN Abebe Garzon DO        melatonin tablet 2.5 mg  2.5 mg Oral Nightly Reuben Garza APRN   2.5 mg at 08/04/24 9637    nitroglycerin (NITROSTAT) SL tablet 0.4 mg  0.4 mg Sublingual Q5 Min PRN Lamine Figueroa APRN        ondansetron (ZOFRAN) injection  4 mg  4 mg Intravenous Q6H PRN Payam Keyes DO   4 mg at 08/05/24 0800    oxyCODONE (ROXICODONE) immediate release tablet 10 mg  10 mg Oral BID Lamine Figueroa APRN   10 mg at 08/05/24 0834    pantoprazole (PROTONIX) EC tablet 40 mg  40 mg Oral Q12H Reuben Garza APRN   40 mg at 08/05/24 0833    Pharmacy to Dose enoxaparin (LOVENOX)   Does not apply Continuous PRN Abebe Garzon DO        Phosphorus Replacement - Follow Nurse / BPA Driven Protocol   Does not apply PRN Abebe Garzon DO        polyethylene glycol (MIRALAX) packet 17 g  17 g Oral Daily Reuben Garza APRN   17 g at 08/04/24 0819    Potassium Replacement - Follow Nurse / BPA Driven Protocol   Does not apply PRN Abebe Garzon DO        pregabalin (LYRICA) capsule 100 mg  100 mg Oral Nightly Reuben Garza APRN   100 mg at 08/04/24 2237    pregabalin (LYRICA) capsule 50 mg  50 mg Oral Daily Reuben Garza APRN   50 mg at 08/05/24 0834    rosuvastatin (CRESTOR) tablet 10 mg  10 mg Oral Nightly Reuben Garza APRN   10 mg at 08/04/24 2237    sodium bicarbonate tablet 650 mg  650 mg Oral TID Brian Lomas MD   650 mg at 08/05/24 0834    sodium chloride 0.9 % flush 10 mL  10 mL Intravenous Q12H Abebe Garzon DO   10 mL at 08/05/24 0841    sodium chloride 0.9 % flush 10 mL  10 mL Intravenous PRN Abebe Garzon DO        sodium chloride 0.9 % flush 10 mL  10 mL Intravenous Q12H Lamine Figueroa APRN   10 mL at 08/05/24 0841    sodium chloride 0.9 % flush 10 mL  10 mL Intravenous PRN Lamine Figueroa APRN        sodium chloride 0.9 % infusion 40 mL  40 mL Intravenous PRN Abebe Garzon DO        sodium chloride 0.9 % infusion 40 mL  40 mL Intravenous PRN Lamine Figueroa APRN        sodium chloride 0.9 % infusion  50 mL/hr Intravenous Continuous Brian Lomas MD   Stopped at 08/04/24 4293    sodium hypochlorite (DAKIN'S 1/4 STRENGTH) 0.125 % topical solution 0.125% solution   Topical Q12H Aby High  E, APRN   Given at 08/03/24 2034     Review of Systems   Constitutional:  Negative for activity change.   HENT:  Negative for congestion.    Respiratory:  Negative for apnea and shortness of breath.    Gastrointestinal:  Negative for abdominal pain.   Musculoskeletal:  Negative for arthralgias and back pain.        Foot pain   Skin:  Positive for wound.   Neurological:  Positive for numbness.   Psychiatric/Behavioral:  Positive for decreased concentration. Negative for agitation, behavioral problems and confusion.        OBJECTIVE     Vitals:    08/05/24 1159   BP: 155/76   Pulse: 69   Resp: 16   Temp: 97.4 °F (36.3 °C)   SpO2: 97%       PHYSICAL EXAM  GEN:   Pt presents in hospital bed. Accompanied by none.     Foot/Ankle Exam    GENERAL  Appearance:  chronically ill  Affect:  unfocused  Right shoe gear: sock  Left shoe gear: sock    VASCULAR     Right Foot Vascularity   Dorsalis pedis:  2+  Posterior tibial:  2+  Skin temperature:  warm  Edema grading:  Trace  CFT:  3  Pedal hair growth:  Absent     Left Foot Vascularity   Dorsalis pedis:  2+  Posterior tibial:  2+  Skin temperature:  warm  Edema grading:  Trace  CFT:  3  Pedal hair growth:  Absent     NEUROLOGIC     Right Foot Neurologic   Light touch sensation: diminished  Vibratory sensation: diminished  Hot/Cold sensation: diminished     Left Foot Neurologic   Light touch sensation: diminished  Vibratory sensation: diminished  Hot/Cold sensation:  diminished    MUSCULOSKELETAL     Right Foot Musculoskeletal   Ecchymosis:  none  Tenderness:  none       Left Foot Musculoskeletal   Ecchymosis:  none  Tenderness:  lateral foot tenderness    MUSCLE STRENGTH     Right Foot Muscle Strength   Foot dorsiflexion:  5  Foot plantar flexion:  5  Foot inversion:  5  Foot eversion:  5     Left Foot Muscle Strength   Foot dorsiflexion:  4+  Foot plantar flexion:  4+  Foot inversion:  4+  Foot eversion:  4+    DERMATOLOGIC      Right Foot Dermatologic   Skin  Right foot skin  is intact.      Left Foot Dermatologic   Skin  Positive for drainage, erythema and ulcer.      Left foot additional comments: Multiple areas of ulceration with serosanguineous drainage, erythema, and slight tenderness present. No malodor noticed.    See attached photos.               RADIOLOGY/NUCLEAR:  US Renal Bilateral    Result Date: 8/1/2024  Narrative: US RENAL BILATERAL- 8/1/2024 11:19 AM  HISTORY: Acute kidney injury; E11.628-Type 2 diabetes mellitus with other skin complications; L08.9-Local infection of the skin and subcutaneous tissue, unspecified; M86.9-Osteomyelitis, unspecified; E11.10-Type 2 diabetes mellitus with ketoacidosis without coma; R79.89-Other specified abnormal findings of blood chemistry; I48.91-Unspecified atrial fibrillation; R41.0-Disorientation, unspecified  COMPARISON: None available.   TECHNIQUE: Multiple longitudinal and transverse real-time sonographic images of the kidneys and urinary bladder are obtained. Report and images stored per institutional and state regulations.    FINDINGS:  Visualized proximal abdominal aorta and IVC are unremarkable.   RIGHT KIDNEY: Right kidney is 10.7 cm in length. Renal cortex is unremarkable. There is no hydronephrosis. There is an exophytic anechoic simple right renal cyst measuring 4.9 cm..  LEFT KIDNEY: Left kidney is 11.3 cm in length. Renal cortex is unremarkable. There is no left hydronephrosis.  PELVIS: Urinary bladder is unremarkable.       Impression:  1. Simple right renal cyst. 2. Otherwise unremarkable kidneys and no hydronephrosis.    This report was signed and finalized on 8/1/2024 1:02 PM by Eran Christina.      CT Abdomen Pelvis With Contrast    Result Date: 8/1/2024  Narrative: EXAMINATION: CT ABDOMEN PELVIS W CONTRAST-   7/31/2024 10:41 PM  HISTORY: abdominal distention with pain; M86.9-Osteomyelitis, unspecified; E11.10-Type 2 diabetes mellitus with ketoacidosis without coma; R79.89-Other specified abnormal findings of blood  chemistry; I48.91-Unspecified atrial fibrillation; R41.0-Disorientation, unspecified  In order to have a CT radiation dose as low as reasonably achievable Automated Exposure Control was utilized for adjustment of the mA and/or KV according to patient size.  Total DLP = 1841.72 mGy.cm  The CT scan of the abdomen and pelvis is performed after intravenous contrast enhancement.  The images are acquired in axial plane and subsequent reconstruction in coronal and sagittal planes.  Comparison is made with the previous study dated 6/27/2024.  The lung bases included in the study show a trace right and small left basal pleural effusion. There are mild atelectatic changes at bilateral bases left more than the right.  Limited visualized cardiomediastinal structures show atheromatous changes of the coronary arteries. There is moderate cardiomegaly.  The liver and spleen are normal.  The gallbladder is surgically absent.  Fatty infiltrated pancreas seen. No focal abnormality. No ductal dilatation. The adrenal glands are normal.  There is persistent bilateral significant perinephric fat infiltration and thickening of the pararenal fascia which is similar to the previous study. Bilateral nephrogram is normal and symmetrical. No calculi. No hydronephrosis. There is a well-defined sharply marginated low density exophytic mass from the lower pole of the right kidney measuring 4.4 cm in diameter. CT density suggest a cyst. Limited visualized ureters are normal and nondilated. The urinary bladder is well distended. No intrinsic abnormality.  Prostate is not significantly enlarged.  There are small fat-containing inguinal hernias, right larger than the left.  There is subcutaneous fat infiltration of the entire abdominal wall extending into the lower extremity. This may represent a fluid overload?.  The stomach is decompressed with moderate wall thickening. No focal abnormality Alamast. Duodenum is normal. Small bowel is nondistended  and nondilated. Appendix is surgically absent. There is significant large volume stool throughout the colon. No finding to suggest obstruction.  Atheromatous changes of the abdominal aorta and iliac arteries. No aneurysmal dilatation.  Moderately prominent nonspecific retroperitoneal para-aortic lymph nodes predominantly in the mid abdomen. A referenced left para iliac lymph node, image #57 in series 2 and image #40 and series 3, measures 1.6 cm in short axis. There is a large lymph node in the left lower anterior pelvis/external iliac group measuring 1.3 cm in short axis. There are moderately prominent inguinal lymph nodes. A left inguinal lymph node measures 2 cm in short axis.  Images reviewed in bone window show chronic degenerative changes of the lumbar spine. No acute bony abnormality.      Impression: 1. A significant perinephric fat stranding is similar to the previous study and is a nonspecific finding. Possibility for chronic inflammatory process/chronic pyelonephritis may not be excluded. Renal functions are normal and symmetrical. 2. Fatty infiltration of the pancreas. No mass. No ductal dilatation. 3. Nonspecific abdominal, pelvic and inguinal lymphadenopathy. The etiology and clinical significance is not certain. This appears moderately more progressive since the previous study. 4. Diffuse subcutaneous fat infiltration of the abdominal wall extending into the lower extremity may represent fluid overload?. 5. A significant large volume of stool in the colon without evidence of obstruction. This may represent constipation.  The above study was initially reviewed and reported by [employer]. I do not find any discrepancies.           This report was signed and finalized on 8/1/2024 7:50 AM by Dr. Carlos Cutler MD.      CT Head Without Contrast    Result Date: 8/1/2024  Narrative: EXAMINATION: CT HEAD WO CONTRAST-   7/31/2024 10:41 PM  HISTORY: altered mental status; M86.9-Osteomyelitis, unspecified;  E11.10-Type 2 diabetes mellitus with ketoacidosis without coma; R79.89-Other specified abnormal findings of blood chemistry; I48.91-Unspecified atrial fibrillation; R41.0-Disorientation, unspecified  In order to have a CT radiation dose as low as reasonably achievable Automated Exposure Control was utilized for adjustment of the mA and/or KV according to patient size.  Total DLP = 783.72 mGy.cm  The CT scan of the head is performed without intravenous contrast enhancement.  The images are acquired in axial plane and subsequent reconstruction with coronal and sagittal planes.  Comparison is made with the previous study dated 5/3/2024.  There is no evidence of a mass. There is no midline shift.  There is no evidence of intracranial hemorrhage or hematoma.  Moderately dilated ventricles, basal cisterns and the cortical sulci are similar to the previous study representing chronic volume loss.  Areas of chronic white matter ischemia bilaterally are noted. The gray-white matter differentiation is maintained.  Posterior fossa structures are normal.  The images reviewed in bone window show no acute displaced skull fracture. A subtle nondisplaced fracture or lesion may be obscured due to motion artifacts. Large mucous retention cyst is seen in the right maxillary antrum. The remaining paranasal sinuses and mastoid air cells are clear.      Impression: 1. No acute intracranial abnormality. 2. Chronic ischemic and atrophic changes. 3. Chronic maxillary sinusitis.  The above study was initially reviewed and reported by [employer]. I do not find any discrepancies.             This report was signed and finalized on 8/1/2024 5:27 AM by Dr. Carlos Cutler MD.      XR Foot 3+ View Left    Result Date: 7/31/2024  Narrative: EXAMINATION:  XR FOOT 3+ VW LEFT-  7/31/2024 6:22 PM  HISTORY: Heel wound.  COMPARISON: 3/26/2024.  TECHNIQUE: 3 views were obtained.  FINDINGS: There is a deep ulcer on the sole of the foot posteriorly. The  ulcer appears to be packed with some type of radiopaque material. On the lateral image, there may be a small area of bony erosion involving the calcaneus just posterior to the plantar calcaneal spur. A small area of osteomyelitis is not ruled out. There has been prior amputation of the fifth toe and distal fifth metatarsal. There may have been prior resection of the distal fourth metacarpal and a portion of the proximal phalanx of the fourth toe versus chronic erosive change. The appearance is stable. There is severe narrowing of the first MTP joint. There is narrowing of some of the interphalangeal joints. There is some spurring in the tarsal region and at the tarsal-metatarsal junction. There is soft tissue swelling of the foot diffusely. There is a small curvilinear foreign body in the soft tissues along the sole of the foot in the second toe region in the proximal phalanx area. There is another small linear foreign body in the soft tissues adjacent to the distal phalanx of the great toe.       Impression: 1. Deep ulcer on the sole of the foot posteriorly near the calcaneus. There is a questionable small area of bony erosion along the plantar surface of the calcaneus just proximal to the plantar calcaneal spur. Osteomyelitis cannot be ruled out. The soft tissue ulcer is packed with some type of radiopaque material. 2. Linear foreign bodies projected over the soft tissues of the second toe and first toe. Artifacts are also included in the differential. 3. Other chronic changes, as discussed.   This report was signed and finalized on 7/31/2024 7:47 PM by Dr. Raz Mendes MD.      XR Chest 1 View    Result Date: 7/31/2024  Narrative: EXAMINATION:  XR CHEST 1 VW-  7/31/2024 6:22 PM  HISTORY: Altered mental status. Hypertension and diabetes.  COMPARISON: 6/28/2024.  TECHNIQUE: Single view AP image.  FINDINGS: There is hypoventilation with vascular crowding. There is mild bronchial wall thickening, stable. There is  no dense infiltrate or effusion. Heart size is borderline. Prior heart bypass surgery. Prior cervical fusion. No definite acute bony abnormality.       Impression: 1. Hypoventilation with vascular crowding. 2. Mild bronchial wall thickening, stable.    This report was signed and finalized on 7/31/2024 7:41 PM by Dr. Raz Mendes MD.       LABORATORY/CULTURE RESULTS:  Results from last 7 days   Lab Units 08/05/24 0444 08/04/24  0615 08/03/24  0605   WBC 10*3/mm3 5.89 5.73 5.75   HEMOGLOBIN g/dL 9.5* 9.5* 10.3*   HEMATOCRIT % 30.1* 30.5* 33.2*   PLATELETS 10*3/mm3 210 201 215     Results from last 7 days   Lab Units 08/05/24 0444 08/04/24  0615 08/03/24  0605   SODIUM mmol/L 144 145 143   POTASSIUM mmol/L 3.9 3.9 3.8   CHLORIDE mmol/L 112* 111* 110*   CO2 mmol/L 21.0* 21.0* 19.0*   BUN mg/dL 20 24* 31*   CREATININE mg/dL 1.47* 1.60* 1.82*   CALCIUM mg/dL 8.3* 8.5* 8.3*   BILIRUBIN mg/dL 0.3 0.3 0.3   ALK PHOS U/L 102 95 88   ALT (SGPT) U/L 9 9 7   AST (SGOT) U/L 16 15 12   GLUCOSE mg/dL 89 97 125*         Microbiology Results (last 10 days)       Procedure Component Value - Date/Time    Wound Culture - Drainage, Foot, Left [[id number]]  (Abnormal) Collected: 08/02/24 1338    Lab Status: Final result Specimen: Drainage from Foot, Left Updated: 08/04/24 0857     Wound Culture Light growth (2+) Streptococcus agalactiae (Group B)     Comment:   This organism is considered to be universally susceptible to penicillin.  No further antibiotic testing will be performed. If Clindamycin or Erythromycin is the drug of choice, notify the laboratory within 7 days to request susceptibility testing.        Gram Stain Rare (1+) WBCs seen      No organisms seen    Wound Culture - Wound, Foot, Left [[id number]]  (Abnormal) Collected: 08/02/24 1032    Lab Status: Final result Specimen: Wound from Foot, Left Updated: 08/04/24 0857     Wound Culture Light growth (2+) Streptococcus agalactiae (Group B)     Comment:   This organism is  considered to be universally susceptible to penicillin.  No further antibiotic testing will be performed. If Clindamycin or Erythromycin is the drug of choice, notify the laboratory within 7 days to request susceptibility testing.        Gram Stain Few (2+) WBCs seen      Rare (1+) Gram positive cocci    Eosinophil Smear - Urine, Urine, Clean Catch [[id number]]  (Normal) Collected: 08/01/24 1547    Lab Status: Final result Specimen: Urine, Clean Catch Updated: 08/02/24 0149     Eosinophil Smear 0 % EOS/100 Cells     MRSA Screen, PCR (Inpatient) - Swab, Nares [[id number]]  (Normal) Collected: 08/01/24 0206    Lab Status: Final result Specimen: Swab from Nares Updated: 08/01/24 0346     MRSA PCR No MRSA Detected    Narrative:      The negative predictive value of this diagnostic test is high and should only be used to consider de-escalating anti-MRSA therapy. A positive result may indicate colonization with MRSA and must be correlated clinically.    Urine Culture - Urine, Straight Cath [[id number]]  (Abnormal)  (Susceptibility) Collected: 07/31/24 1851    Lab Status: Final result Specimen: Urine from Straight Cath Updated: 08/04/24 1129     Urine Culture >100,000 CFU/mL Escherichia coli ESBL    Narrative:      Colonization of the urinary tract without infection is common. Treatment is discouraged unless the patient is symptomatic, pregnant, or undergoing an invasive urologic procedure.  Recent outcomes data supports the use of pip/tazo in the treatment of susceptible ESBL infections for uncomplicated UTI. Consider use of pip/tazo as a carbapenem-sparing regimen in applicable patients.    Susceptibility        Escherichia coli ESBL      MATT      Amikacin Susceptible      Ertapenem Susceptible      Gentamicin Resistant      Levofloxacin Susceptible      Meropenem Susceptible      Nitrofurantoin Susceptible      Piperacillin + Tazobactam Susceptible      Tobramycin Resistant      Trimethoprim + Sulfamethoxazole Resistant                            Blood Culture - Blood, Hand, Left [[id number]]  (Normal) Collected: 07/31/24 1849    Lab Status: Preliminary result Specimen: Blood from Hand, Left Updated: 08/04/24 1916     Blood Culture No growth at 4 days    Blood Culture - Blood, Arm, Left [[id number]]  (Abnormal) Collected: 07/31/24 1849    Lab Status: Final result Specimen: Blood from Arm, Left Updated: 08/02/24 0545     Blood Culture Staphylococcus, coagulase negative     Isolated from Aerobic Bottle     Gram Stain Aerobic Bottle Gram positive cocci in clusters    Narrative:      Probable contaminant requires clinical correlation, susceptibility not performed unless requested by physician.      Blood Culture ID, PCR - Blood, Arm, Left [[id number]]  (Abnormal) Collected: 07/31/24 1849    Lab Status: Final result Specimen: Blood from Arm, Left Updated: 08/01/24 1117     BCID, PCR Staph spp, not aureus or lugdunensis. Identification by BCID2 PCR.     BOTTLE TYPE Aerobic Bottle            PATHOLOGY RESULTS:       ASSESSMENT/PLAN   Diabetic foot ulcerations to the left foot  Type 2 diabetes mellitus with hyperglycemia  Diabetic polyneuropathy    Review of labs, imaging, and other provider documentation  Comprehensive lower extremity examination and evaluation was performed.    Linear foreign bodies projected over the soft tissues of the second  toe and first toe were noted upon xray imaging (artifacts were also included in the differential). No evidence of soft tissue damage, FB entry, or infection noted to either great or second toes today.      Discussed with patient findings of wound drainage culture.    Continue wound care orders- Betadine wet-to-dry dressing to be performed twice daily per nursing.     No surgical interventions are planned at this time from a podiatric standpoint.     Due to difficulty caring for foot wounds at home, our recommendation would be for the patient to go to a skilled nursing facility for ongoing wound care  upon discharge.       Thank you kindly for the consult, will continue to follow patient while in house.         This document has been electronically signed by SUSAN Perez on August 5, 2024 13:11 CDT

## 2024-08-05 NOTE — PAYOR COMM NOTE
"Erick Luong (69 y.o. Male)  [id number]   CONT CLINICAL   Please review clinical for Additional Days      [facility] phone     fax         Date of Birth   1955    Social Security Number       Address   [address] [place] [address]    Home Phone       MRN   [id number]       Scientology   [facility]    Marital Status                               Admission Date   7/31/24    Admission Type   Emergency    Admitting Provider   Alejandrina Barnett DO    Attending Provider   Alejandrina Barnett DO    Department, Room/Bed   [facility] 3C, 365/1       Discharge Date       Discharge Disposition       Discharge Destination                                 Attending Provider: Alejandrina Barnett DO    Allergies: Cefepime, Bactrim [Sulfamethoxazole-trimethoprim], Vancomycin, Zolpidem, Metronidazole    Isolation: Contact   Infection: MRSA (05/19/19), COVID (History) (08/08/22), ESBL E coli (06/30/24)   Code Status: CPR    Ht: 182.9 cm (72\")   Wt: 131 kg (289 lb)    Admission Cmt: None   Principal Problem: DKA, type 2, not at goal [E11.10]                   Active Insurance as of 7/31/2024       Primary Coverage       Payor Plan Insurance Group Employer/Plan Group    [employer] [facility] EMPLOYEE [id number]       Payor Plan Address Payor Plan Phone Number Payor Plan Fax Number Effective Dates    [address] [phone]  1/1/2022 - None Entered    [facility] [id number]         Subscriber Name Subscriber Birth Date Member ID       ZAINAB LUONG 11/27/1969 [id number]               Secondary Coverage       Payor Plan Insurance Group Employer/Plan Group    MEDICARE MEDICARE A & B        Payor Plan Address Payor Plan Phone Number Payor Plan Fax Number Effective Dates    [address] [phone]  7/1/2013 - None Entered    [facility] [id number]         Subscriber Name Subscriber Birth Date Member ID       ERICK LUONG 1955 " [id number]                     Emergency Contacts        (Rel.) Home Phone Work Phone Mobile Phone    Joan Luong (Spouse) [phone]              Current Facility-Administered Medications   Medication Dose Route Frequency Provider Last Rate Last Admin    ascorbic acid (VITAMIN C) tablet 1,000 mg  1,000 mg Oral Daily Reuben Garza APRN   1,000 mg at 08/05/24 0834    sennosides-docusate (PERICOLACE) 8.6-50 MG per tablet 2 tablet  2 tablet Oral BID Lamine Figueroa APRN   2 tablet at 08/04/24 0819    And    polyethylene glycol (MIRALAX) packet 17 g  17 g Oral Daily PRN Lamine Figueroa APRN        And    bisacodyl (DULCOLAX) EC tablet 5 mg  5 mg Oral Daily PRN Lamine Figueroa APRN        And    bisacodyl (DULCOLAX) suppository 10 mg  10 mg Rectal Daily PRN Lamine Figueroa APRN   10 mg at 08/02/24 0941    Calcium Replacement - Follow Nurse / BPA Driven Protocol   Does not apply PRN Aebbe Garzon DO        dextrose (D50W) (25 g/50 mL) IV injection 10-50 mL  10-50 mL Intravenous Q15 Min PRN Abebe Garzon DO        dextrose (D50W) (25 g/50 mL) IV injection 25 g  25 g Intravenous Q15 Min PRN Lamine Figueroa APRN        dextrose (GLUTOSE) oral gel 15 g  15 g Oral Q15 Min PRN Lamine Figueroa APRN        donepezil (ARICEPT) tablet 10 mg  10 mg Oral Daily Reuben Garza APRN   10 mg at 08/05/24 0834    DULoxetine (CYMBALTA) DR capsule 60 mg  60 mg Oral Daily Reuben Garza APRN   60 mg at 08/05/24 0834    Enoxaparin Sodium (LOVENOX) syringe 135 mg  1 mg/kg Subcutaneous Q12H Abebe Garzon DO   135 mg at 08/05/24 0833    ertapenem (INVanz) 1,000 mg in sodium chloride 0.9 % 100 mL MBP  1,000 mg Intravenous Q24H Julia Adrian  mL/hr at 08/04/24 1357 1,000 mg at 08/04/24 1357    famotidine (PEPCID) tablet 20 mg  20 mg Oral Q12H Reuben Garza APRN   20 mg at 08/05/24 0833    ferric gluconate (FERRLECIT) 250 mg in sodium chloride 0.9 % 250  mL IVPB  250 mg Intravenous Daily Brian Lomas  mL/hr at 08/05/24 0833 250 mg at 08/05/24 0833    glucagon (GLUCAGEN) injection 1 mg  1 mg Intramuscular Q15 Min PRN Lamine Figueroa APRN        insulin glargine (LANTUS, SEMGLEE) injection 10 Units  10 Units Subcutaneous Daily Keren Jamison MD   10 Units at 08/04/24 0823    insulin regular (humuLIN R,novoLIN R) injection 3-14 Units  3-14 Units Subcutaneous 4x Daily AC & at Bedtime Keren Jamison MD   3 Units at 08/03/24 2033    Magnesium Standard Dose Replacement - Follow Nurse / BPA Driven Protocol   Does not apply PRN Abebe Garzon DO        melatonin tablet 2.5 mg  2.5 mg Oral Nightly Reuben Garza APRN   2.5 mg at 08/04/24 2237    nitroglycerin (NITROSTAT) SL tablet 0.4 mg  0.4 mg Sublingual Q5 Min PRN Lamine Figueroa APRN        ondansetron (ZOFRAN) injection 4 mg  4 mg Intravenous Q6H PRN Payam Keyes DO   4 mg at 08/05/24 0800    oxyCODONE (ROXICODONE) immediate release tablet 10 mg  10 mg Oral BID Lamine Figueroa APRN   10 mg at 08/05/24 0834    pantoprazole (PROTONIX) EC tablet 40 mg  40 mg Oral Q12H Reuben Garza APRN   40 mg at 08/05/24 0833    Pharmacy to Dose enoxaparin (LOVENOX)   Does not apply Continuous PRN Abebe Garzon DO        Phosphorus Replacement - Follow Nurse / BPA Driven Protocol   Does not apply PRN Abebe Garzon DO        polyethylene glycol (MIRALAX) packet 17 g  17 g Oral Daily Reuben Garza APRN   17 g at 08/04/24 0819    Potassium Replacement - Follow Nurse / BPA Driven Protocol   Does not apply PRN Abebe Garzon DO        pregabalin (LYRICA) capsule 100 mg  100 mg Oral Nightly Reuben Garza APRN   100 mg at 08/04/24 2237    pregabalin (LYRICA) capsule 50 mg  50 mg Oral Daily Reuben Garza APRN   50 mg at 08/05/24 0834    rosuvastatin (CRESTOR) tablet 10 mg  10 mg Oral Nightly Reuben Garza APRN   10 mg at 08/04/24 2237    sodium bicarbonate tablet 650 mg  650 mg  Oral TID Brian Lomas MD   650 mg at 08/05/24 0834    sodium chloride 0.9 % flush 10 mL  10 mL Intravenous Q12H Abebe Garzon,    10 mL at 08/05/24 0841    sodium chloride 0.9 % flush 10 mL  10 mL Intravenous PRN Abebe Garzon,         sodium chloride 0.9 % flush 10 mL  10 mL Intravenous Q12H Lamine Figueroa APRN   10 mL at 08/05/24 0841    sodium chloride 0.9 % flush 10 mL  10 mL Intravenous PRN Lamine Figueroa APRN        sodium chloride 0.9 % infusion 40 mL  40 mL Intravenous PRN Abebe Garzon,         sodium chloride 0.9 % infusion 40 mL  40 mL Intravenous PRN Lamine Figueroa APRN        sodium chloride 0.9 % infusion  50 mL/hr Intravenous Continuous Brian Lomas MD   Stopped at 08/04/24 2235    sodium hypochlorite (DAKIN'S 1/4 STRENGTH) 0.125 % topical solution 0.125% solution   Topical Q12H Aby High APRN   Given at 08/03/24 2034        Physician Progress Notes (last 24 hours)        Alejandrina Barnett DO at 08/05/24 1200              [facility] Medicine Services  INPATIENT PROGRESS NOTE    Patient Name: Erick Luong  Date of Admission: 7/31/2024  Today's Date: 08/05/24  Length of Stay: 5  Primary Care Physician: Del Shetty MD    Subjective   Chief Complaint: Weak    HPI   Patient ambulatory with therapy this AM  Per therapy marked improvement in gait over last 24 hours.   Mood doing ok.  Wants soda to drink.  Does not want to go to SNF or have HH.       Review of Systems   All pertinent negatives and positives are as above. All other systems have been reviewed and are negative unless otherwise stated.     Objective    Temp:  [97.3 °F (36.3 °C)-97.7 °F (36.5 °C)] 97.7 °F (36.5 °C)  Heart Rate:  [69-81] 69  Resp:  [18] 18  BP: (121-156)/(55-78) 121/55  Physical Exam  Vitals and nursing note reviewed.   Constitutional:       Appearance: Normal appearance.   HENT:      Head: Normocephalic and atraumatic.      Right Ear: External ear  normal.      Left Ear: External ear normal.      Nose: Nose normal.      Mouth/Throat:      Mouth: Mucous membranes are moist.   Eyes:      Extraocular Movements: Extraocular movements intact.      Conjunctiva/sclera: Conjunctivae normal.      Pupils: Pupils are equal, round, and reactive to light.   Cardiovascular:      Rate and Rhythm: Normal rate and regular rhythm.      Pulses: Normal pulses.      Heart sounds: No murmur heard.     No friction rub. No gallop.   Pulmonary:      Effort: Pulmonary effort is normal.      Breath sounds: Normal breath sounds.   Abdominal:      General: Bowel sounds are normal.      Palpations: Abdomen is soft.   Musculoskeletal:         General: Normal range of motion.      Cervical back: Normal range of motion and neck supple.      Comments: Patient with dressing intact to left foot.     Skin:     General: Skin is warm and dry.      Capillary Refill: Capillary refill takes less than 2 seconds.   Neurological:      General: No focal deficit present.      Mental Status: He is alert and oriented to person, place, and time.      Cranial Nerves: No cranial nerve deficit.   Psychiatric:         Mood and Affect: Mood normal.         Behavior: Behavior normal.             Results Review:  I have reviewed the labs, radiology results, and diagnostic studies.    Laboratory Data:   Results from last 7 days   Lab Units 08/05/24  0444 08/04/24  0615 08/03/24  0605   WBC 10*3/mm3 5.89 5.73 5.75   HEMOGLOBIN g/dL 9.5* 9.5* 10.3*   HEMATOCRIT % 30.1* 30.5* 33.2*   PLATELETS 10*3/mm3 210 201 215        Results from last 7 days   Lab Units 08/05/24  0444 08/04/24  0615 08/03/24  0605   SODIUM mmol/L 144 145 143   POTASSIUM mmol/L 3.9 3.9 3.8   CHLORIDE mmol/L 112* 111* 110*   CO2 mmol/L 21.0* 21.0* 19.0*   BUN mg/dL 20 24* 31*   CREATININE mg/dL 1.47* 1.60* 1.82*   CALCIUM mg/dL 8.3* 8.5* 8.3*   BILIRUBIN mg/dL 0.3 0.3 0.3   ALK PHOS U/L 102 95 88   ALT (SGPT) U/L 9 9 7   AST (SGOT) U/L 16 15 12    GLUCOSE mg/dL 89 97 125*       Culture Data:   Blood Culture   Date Value Ref Range Status   07/31/2024 No growth at 4 days  Preliminary   07/31/2024 Staphylococcus, coagulase negative (C)  Final     Urine Culture   Date Value Ref Range Status   07/31/2024 >100,000 CFU/mL Escherichia coli ESBL (A)  Final     Wound Culture   Date Value Ref Range Status   08/02/2024 (A)  Final    Light growth (2+) Streptococcus agalactiae (Group B)     Comment:       This organism is considered to be universally susceptible to penicillin.  No further antibiotic testing will be performed. If Clindamycin or Erythromycin is the drug of choice, notify the laboratory within 7 days to request susceptibility testing.   08/02/2024 (A)  Final    Light growth (2+) Streptococcus agalactiae (Group B)     Comment:       This organism is considered to be universally susceptible to penicillin.  No further antibiotic testing will be performed. If Clindamycin or Erythromycin is the drug of choice, notify the laboratory within 7 days to request susceptibility testing.       Radiology Data:   Imaging Results (Last 24 Hours)       ** No results found for the last 24 hours. **            I have reviewed the patient's current medications.     Assessment/Plan   Assessment  Active Hospital Problems    Diagnosis     **DKA, type 2, not at goal     E. coli UTI, ESBL     Atrial fibrillation     Chronic heart failure with preserved ejection fraction (HFpEF)     Chronic diastolic heart failure     GERD without esophagitis     Diabetic ulcer of left foot associated with type 2 diabetes mellitus        Treatment Plan  Continue current treatment/antibiotic  Monitor blood sugar.   Encourage compliance with diabetic regimen   Home in 1-2 days  Will need outpatient antibiotic recommendations from ID and social service will need to arrange.     Medical Decision Making  Number and Complexity of problems:     3 acute, high complexity problems  4+ chronic, moderate  complexity problems     Differential Diagnosis: None     Conditions and Status        Condition is improving.     [facility] Data  External documents reviewed: Care Everywhere  Cardiac tracing (EKG, telemetry) interpretation: See HPI  Radiology interpretation: See HPI  Labs reviewed: See HPI  Any tests that were considered but not ordered: None     Decision rules/scores evaluated (example [id number], Wells, etc): None     Discussed with: The patient     Care Planning  Shared decision making: Patient  Code status and discussions: Full code    Disposition  Social Determinants of Health that impact treatment or disposition: none    I expect the patient to be discharged to home in 1-2 days.     Electronically signed by Alejandrina Barnett DO, 08/05/24, 12:00 CDT.      Electronically signed by Alejandrina Barnett DO at 08/05/24 1210       Stewart Alvarez APRN at 08/05/24 0951          Nephrology ([facility]) Progress Note      Patient:  Erick Luong  YOB: 1955  Date of Service: 8/5/2024  MRN: [id number]   Acct: [id number]   Primary Care Physician: Del Shetty MD  Advance Directive:   Code Status and Medical Interventions: CPR (Attempt to Resuscitate); Full Support   Ordered at: 08/01/24 0053     Level Of Support Discussed With:    Patient     Code Status (Patient has no pulse and is not breathing):    CPR (Attempt to Resuscitate)     Medical Interventions (Patient has pulse or is breathing):    Full Support     Admit Date: 7/31/2024       Hospital Day: 5  Referring Provider: Abebe Garzon DO      Patient personally seen and examined.  Complete chart including Consults, Notes, Operative Reports, Labs, Cardiology, and Radiology studies reviewed as able.        Subjective:  Erick Luong is a 69 y.o. male for whom we were consulted for evaluation and treatment of acute kidney injury. Baseline chronic kidney disease stage 3b. Baseline creatinine approximately 1.5-1.8.  Follows in our office.  History of poorly controlled type 2 diabetes, hypertension, coronary artery disease, diabetic foot ulcer, obesity.  On 7/31 patient was found wandering at home, confused. Brought to ER for evaluation. Has a nonhealing left foot diabetic ulcer with serosanguineous drainage. Blood glucose level was >700 with A1c >12%. Initial creatinine was 2.67 and has steadily improved since he was admitted. Nephrology consulted on 8/01. Hospital course remarkable for improving renal function and improved blood glucose levels. Moved to medical floor    Today is awake and alert. Remains weak but otherwise feeling better. No new overnight issues. Urine output nonoliguric    Allergies:  Cefepime, Bactrim [sulfamethoxazole-trimethoprim], Vancomycin, Zolpidem, and Metronidazole    Home Meds:  Medications Prior to Admission   Medication Sig Dispense Refill Last Dose    ascorbic acid (VITAMIN C) 1000 MG tablet Take 1 tablet by mouth Daily. 30 tablet 3     bumetanide (BUMEX) 1 MG tablet Take 1 tablet by mouth 2 (Two) Times a Day for 30 days. 60 tablet 0     busPIRone (BUSPAR) 10 MG tablet Take 1 tablet by mouth 2 (Two) Times a Day.       calcitriol (ROCALTROL) 0.5 MCG capsule Take 1 capsule by mouth Daily. 90 capsule 4     carvedilol (COREG) 3.125 MG tablet Take 1 tablet twice a day by oral route. 60 tablet 4     donepezil (ARICEPT) 10 MG tablet Take 1 tablet by mouth Daily. 90 tablet 1     DULoxetine (CYMBALTA) 60 MG capsule Take 1 capsule by mouth Daily. 90 capsule 4     famotidine (PEPCID) 20 MG tablet Take 1 tablet twice a day by oral route. 180 tablet 4     Insulin Glargine (Lantus SoloStar) 100 UNIT/ML injection pen Inject 20 Units under the skin into the appropriate area as directed every night at bedtime. 15 mL 3     Insulin Regular Human, Conc, (HumuLIN R) 500 UNIT/ML solution pen-injector CONCENTRATED injection Inject 40 Units under the skin into the appropriate area as directed 2 (Two) Times a Day Before  Meals. Inject 40 units under the skin in the the appropriate area with regular meals AND 40 units with large meals.       Iron-Vitamin C (Vitron-C)  MG tablet Take 1 tablet twice a day by oral route. 60 tablet 2     levocetirizine (XYZAL) 5 MG tablet Take 1 tablet by mouth every day at bedtime. 30 tablet 2     melatonin 3 MG tablet Take 2 tablets by mouth At Night As Needed for Sleep. 30 tablet 0     oxyCODONE (ROXICODONE) 10 MG tablet Take 1 tablet by mouth 2 (Two) Times a Day As Needed. Must last 30 days per md. 55 tablet 0     pantoprazole (PROTONIX) 40 MG EC tablet Take 1 tablet by mouth Every 12 (Twelve) Hours. 180 tablet 4     pregabalin (LYRICA) 100 MG capsule Take 1 capsule by mouth Daily.       pregabalin (LYRICA) 100 MG capsule Take 2 capsules by mouth every night at bedtime.       rosuvastatin (CRESTOR) 10 MG tablet Take 1 tablet by mouth Every Night. 30 tablet 2     sucralfate (CARAFATE) 1 g tablet Take 1 tablet by mouth 4 (Four) Times a Day before meals. 360 tablet 1     Diclofenac Sodium (VOLTAREN) 1 % gel gel Apply 2 g topically to the appropriate area as directed 4 (Four) Times a Day As Needed. 300 g 11     nitroglycerin (NITROSTAT) 0.4 MG SL tablet Place 1 tablet under the tongue Every 5 (Five) Minutes As Needed for Chest Pain. Take no more than 3 doses in 15 minutes.       polyethylene glycol (MIRALAX) 17 GM/SCOOP powder Take 17 g by mouth Daily As Needed (constipation).       sennosides-docusate (PERICOLACE) 8.6-50 MG per tablet Take 1 tablet by mouth Every Night. Obtain OTC          Medicines:  Current Facility-Administered Medications   Medication Dose Route Frequency Provider Last Rate Last Admin    ascorbic acid (VITAMIN C) tablet 1,000 mg  1,000 mg Oral Daily Reuben Garza APRN   1,000 mg at 08/05/24 0834    sennosides-docusate (PERICOLACE) 8.6-50 MG per tablet 2 tablet  2 tablet Oral BID Lamine Figueroa APRN   2 tablet at 08/04/24 0819    And    polyethylene glycol (MIRALAX)  packet 17 g  17 g Oral Daily PRN Lamine Figueroa APRN        And    bisacodyl (DULCOLAX) EC tablet 5 mg  5 mg Oral Daily PRN Lamine Figueroa APRN        And    bisacodyl (DULCOLAX) suppository 10 mg  10 mg Rectal Daily PRN Lamine Figueroa APRN   10 mg at 08/02/24 0941    Calcium Replacement - Follow Nurse / BPA Driven Protocol   Does not apply PRN Abebe Garzon DO        dextrose (D50W) (25 g/50 mL) IV injection 10-50 mL  10-50 mL Intravenous Q15 Min PRN Abebe Garzon DO        dextrose (D50W) (25 g/50 mL) IV injection 25 g  25 g Intravenous Q15 Min PRN Lamine Figueroa APRN        dextrose (GLUTOSE) oral gel 15 g  15 g Oral Q15 Min PRN Lamine Figueroa APRN        donepezil (ARICEPT) tablet 10 mg  10 mg Oral Daily Reuben Garza APRN   10 mg at 08/05/24 0834    DULoxetine (CYMBALTA) DR capsule 60 mg  60 mg Oral Daily Reuben Garza APRN   60 mg at 08/05/24 0834    Enoxaparin Sodium (LOVENOX) syringe 135 mg  1 mg/kg Subcutaneous Q12H Abebe Garzon DO   135 mg at 08/05/24 0833    ertapenem (INVanz) 1,000 mg in sodium chloride 0.9 % 100 mL MBP  1,000 mg Intravenous Q24H Julia Adrian  mL/hr at 08/04/24 1357 1,000 mg at 08/04/24 1357    famotidine (PEPCID) tablet 20 mg  20 mg Oral Q12H Reuben Garza APRN   20 mg at 08/05/24 0833    ferric gluconate (FERRLECIT) 250 mg in sodium chloride 0.9 % 250 mL IVPB  250 mg Intravenous Daily Brian Lomas  mL/hr at 08/05/24 0833 250 mg at 08/05/24 0833    glucagon (GLUCAGEN) injection 1 mg  1 mg Intramuscular Q15 Min PRN Lamine Figueroa APRN        insulin glargine (LANTUS, SEMGLEE) injection 10 Units  10 Units Subcutaneous Daily Elbaage, Thar Y, MD   10 Units at 08/04/24 0823    insulin regular (humuLIN R,novoLIN R) injection 3-14 Units  3-14 Units Subcutaneous 4x Daily AC & at Bedtime Keren Jamison MD   3 Units at 08/03/24 2033    Magnesium Standard Dose Replacement - Follow Nurse / BPA Driven Protocol   Does not apply PRN  Abebe Garzon DO        melatonin tablet 2.5 mg  2.5 mg Oral Nightly Reuben Garza APRN   2.5 mg at 08/04/24 2237    nitroglycerin (NITROSTAT) SL tablet 0.4 mg  0.4 mg Sublingual Q5 Min PRN Lamine Figueroa APRN        ondansetron (ZOFRAN) injection 4 mg  4 mg Intravenous Q6H PRN Payam Keyes DO   4 mg at 08/05/24 0800    oxyCODONE (ROXICODONE) immediate release tablet 10 mg  10 mg Oral BID Lamine Figueroa APRN   10 mg at 08/05/24 0834    pantoprazole (PROTONIX) EC tablet 40 mg  40 mg Oral Q12H Reuben Garza APRN   40 mg at 08/05/24 0833    Pharmacy to Dose enoxaparin (LOVENOX)   Does not apply Continuous PRN Abebe Garzon DO        Phosphorus Replacement - Follow Nurse / BPA Driven Protocol   Does not apply PRN Abebe Garzon DO        polyethylene glycol (MIRALAX) packet 17 g  17 g Oral Daily Reuben Garza APRN   17 g at 08/04/24 0819    Potassium Replacement - Follow Nurse / BPA Driven Protocol   Does not apply PRN Abebe Garzon DO        pregabalin (LYRICA) capsule 100 mg  100 mg Oral Nightly Reuben Garza APRN   100 mg at 08/04/24 2237    pregabalin (LYRICA) capsule 50 mg  50 mg Oral Daily Reuben Garza APRN   50 mg at 08/05/24 0834    rosuvastatin (CRESTOR) tablet 10 mg  10 mg Oral Nightly Reuben Garza APRN   10 mg at 08/04/24 2237    sodium bicarbonate tablet 650 mg  650 mg Oral TID Brian Lomas MD   650 mg at 08/05/24 0834    sodium chloride 0.9 % flush 10 mL  10 mL Intravenous Q12H Abebe Garzon DO   10 mL at 08/05/24 0841    sodium chloride 0.9 % flush 10 mL  10 mL Intravenous PRN Abebe Garzon DO        sodium chloride 0.9 % flush 10 mL  10 mL Intravenous Q12H Lamine Figueroa APRN   10 mL at 08/05/24 0841    sodium chloride 0.9 % flush 10 mL  10 mL Intravenous PRN Lamine Figueroa APRN        sodium chloride 0.9 % infusion 40 mL  40 mL Intravenous PRN Abebe Garzon DO        sodium chloride 0.9 % infusion 40 mL  40 mL Intravenous  PRN Lamine Figueroa APRN        sodium chloride 0.9 % infusion  50 mL/hr Intravenous Continuous Brian Lomas MD   Stopped at 08/04/24 2235    sodium hypochlorite (DAKIN'S 1/4 STRENGTH) 0.125 % topical solution 0.125% solution   Topical Q12H Aby High APRN   Given at 08/03/24 2034       Past Medical History:  Past Medical History:   Diagnosis Date    Arthritis     Autonomic disease     CAD (coronary artery disease) 02/06/2017    Cervical radiculopathy 09/16/2021    Chronic constipation with acute exaccerbation 05/10/2021    Coronary artery disease     Degeneration of cervical intervertebral disc 08/11/2021    Diabetes mellitus     Diabetic foot ulcer 08/31/2020    Diabetic polyneuropathy associated with type 2 diabetes mellitus 01/18/2021    Elevated cholesterol     Gastroesophageal reflux disease 05/13/2019    Headache     HTN (hypertension), benign 05/03/2017    Hyperlipidemia     Hypertension     Mixed hyperlipidemia 02/07/2017    Multiple lung nodules 01/26/2020    5mm, 9 mm RLL identified 1/2020, not present 10/2019.    Myocardial infarction     Osteomyelitis 01/22/2020    Osteomyelitis of fifth toe of right foot 10/07/2019    Pancreatitis     Persistent insomnia 01/20/2020    Renal disorder     Sleep apnea 02/06/2017    Sleep apnea with use of continuous positive airway pressure (CPAP)     NON-COMPLIANT    Slow transit constipation 01/16/2019    Spinal stenosis in cervical region 09/16/2021    Vitamin D deficiency 03/02/2021       Past Surgical History:  Past Surgical History:   Procedure Laterality Date    ABDOMINAL SURGERY      AMPUTATION FOOT / TOE Left 10/2021    5th digit     ANTERIOR CERVICAL DISCECTOMY W/ FUSION N/A 08/05/2022    Procedure: CERVICAL DISCECTOMY ANTERIOR WITH FUSION C5-6 with possible plating of C5-7 with neuromonitoring and 1 c-arm;  Surgeon: Kaerl Soliz MD;  Location: [facility];  Service: Neurosurgery;  Laterality: N/A;    APPENDECTOMY      BACK SURGERY      CARDIAC  CATHETERIZATION Left 02/08/2021    Procedure: Left Heart Cath w poss intervention left anatomical snuff box acess;  Surgeon: Omkar Charles DO;  Location: [facility] CATH INVASIVE LOCATION;  Service: Cardiology;  Laterality: Left;    CARDIAC SURGERY      CATARACT EXTRACTION      CERVICAL SPINE SURGERY      COLONOSCOPY N/A 01/31/2017    Normal exam repeat in 5 years    COLONOSCOPY N/A 02/11/2019    Mild acute inflammation    COLONOSCOPY N/A 04/07/2024    2 areas at 10 and 20 cm with friability ulceration 2 clips placed at 20 cm and 4 clips at 10 cm poor prep normal mucosa,mild eroisions and ulcerations in visible vessels    COLONOSCOPY N/A 7/1/2024    Procedure: COLONOSCOPY WITH ANESTHESIA;  Surgeon: Arsalan Lorenzo DO;  Location: [facility] ENDOSCOPY;  Service: Gastroenterology;  Laterality: N/A;  pre op constipation/diarrhea  post poor prep  pcp Del Shetty    COLONOSCOPY N/A 7/2/2024    Procedure: COLONOSCOPY WITH ANESTHESIA;  Surgeon: Agapito Christopher MD;  Location: [facility] ENDOSCOPY;  Service: Gastroenterology;  Laterality: N/A;  pre rectal bleeding  post poor prep  pcp Del Shetty MD    COLONOSCOPY W/ POLYPECTOMY  03/04/2014    Hyperplastic polyp    CORONARY ARTERY BYPASS GRAFT  10/2015    ENDOSCOPY  04/13/2011    Gastritis with hemorrhage    ENDOSCOPY N/A 05/05/2017    Normal exam    ENDOSCOPY N/A 02/11/2019    Gastritis    ENDOSCOPY N/A 09/01/2020    Non-erosive gastritis with hemorrhage    ENDOSCOPY N/A 02/10/2021    Esophagitis    ENDOSCOPY N/A 04/11/2024    There were esophageal mucosal changes suspicious for short-segment Low's esophagus present in the distal esophagus. The maximum longitudinal extent of these mucosal changes was 2 cm in length. Mucosa was biopsied with a cold forceps for histologyDistal esophagus, biopsies: Mild chronic active esophagogastritis. No evidence of intestinal metaplasia, dysplasia. Antrum, bx, Mild chronic gastritis    FOOT SURGERY Left     INCISION AND  DRAINAGE OF WOUND Left 2007    spider bite       Family History  Family History   Problem Relation Age of Onset    Colon cancer Father     Heart disease Father     Colon cancer Sister     Colon polyps Sister     Alzheimer's disease Mother     Coronary artery disease Sister     Coronary artery disease Sister        Social History  Social History     Socioeconomic History    Marital status:    Tobacco Use    Smoking status: Former     Current packs/day: 0.00     Types: Cigarettes     Quit date:      Years since quittin.6    Smokeless tobacco: Never    Tobacco comments:     smoked in highschool   Vaping Use    Vaping status: Never Used   Substance and Sexual Activity    Alcohol use: No    Drug use: No    Sexual activity: Defer       Review of Systems:  History obtained from chart review and the patient  General ROS: No fever or chills  Respiratory ROS: No cough, shortness of breath, wheezing  Cardiovascular ROS: No chest pain or palpitations  Gastrointestinal ROS: No abdominal pain or melena  Genito-Urinary ROS: No dysuria or hematuria  Psych ROS: No anxiety and depression  14 point ROS reviewed with the patient and negative except as noted above and in the HPI unless unable to obtain.    Objective:  Patient Vitals for the past 24 hrs:   BP Temp Temp src Pulse Resp SpO2 Weight   24 0455 -- -- -- -- -- -- 131 kg (289 lb)   24 0439 121/55 97.7 °F (36.5 °C) Oral 69 18 97 % --   24 0047 139/60 97.7 °F (36.5 °C) Oral 69 18 96 % --   24 1900 129/72 97.4 °F (36.3 °C) Oral 70 18 98 % --   24 1648 156/78 97.4 °F (36.3 °C) Oral 78 18 90 % --   24 1231 145/63 97.3 °F (36.3 °C) Oral 81 18 99 % --       Intake/Output Summary (Last 24 hours) at 2024 0951  Last data filed at 2024 0834  Gross per 24 hour   Intake 2324 ml   Output 1000 ml   Net 1324 ml     General: awake/alert   Chest:  clear to auscultation bilaterally without respiratory distress  CVS: regular rate and  rhythm  Abdominal: soft, nontender, positive bowel sounds  Extremities: no cyanosis or edema  Skin:  left foot ulcer and warm and dry without rash      Labs:  Results from last 7 days   Lab Units 08/05/24 0444 08/04/24  0615 08/03/24  0605   WBC 10*3/mm3 5.89 5.73 5.75   HEMOGLOBIN g/dL 9.5* 9.5* 10.3*   HEMATOCRIT % 30.1* 30.5* 33.2*   PLATELETS 10*3/mm3 210 201 215         Results from last 7 days   Lab Units 08/05/24 0444 08/04/24  0615 08/03/24  0605   SODIUM mmol/L 144 145 143   POTASSIUM mmol/L 3.9 3.9 3.8   CHLORIDE mmol/L 112* 111* 110*   CO2 mmol/L 21.0* 21.0* 19.0*   BUN mg/dL 20 24* 31*   CREATININE mg/dL 1.47* 1.60* 1.82*   CALCIUM mg/dL 8.3* 8.5* 8.3*   EGFR mL/min/1.73 51.6* 46.6* 40.0*   BILIRUBIN mg/dL 0.3 0.3 0.3   ALK PHOS U/L 102 95 88   ALT (SGPT) U/L 9 9 7   AST (SGOT) U/L 16 15 12   GLUCOSE mg/dL 89 97 125*       Radiology:   Imaging Results (Last 72 Hours)       ** No results found for the last 72 hours. **            Culture:  Blood Culture   Date Value Ref Range Status   07/31/2024 No growth at 24 hours  Preliminary   07/31/2024 Staphylococcus, coagulase negative (C)  Final         Assessment    Acute kidney injury, ATN--resolving  Baseline chronic kidney disease stage 3b  Type 2 diabetes  Hypertension  Sepsis related to diabetic foot ulcer  Anemia of CKD  Obesity     Plan:   Wean IV fluids  Renal function improved, back to baseline level  Monitor labs      SUSAN Calvert  8/5/2024  09:51 CDT      Electronically signed by Stewart Alvarez APRN at 08/05/24 0954       Brian Lomas MD at 08/04/24 1441          Nephrology ([facility]) Progress Note      Patient:  Erick Luong  YOB: 1955  Date of Service: 8/4/2024  MRN: [id number]   Acct: [id number]   Primary Care Physician: Del Shetty MD  Advance Directive:   Code Status and Medical Interventions: CPR (Attempt to Resuscitate); Full Support   Ordered at: 08/01/24 0053     Level Of  Support Discussed With:    Patient     Code Status (Patient has no pulse and is not breathing):    CPR (Attempt to Resuscitate)     Medical Interventions (Patient has pulse or is breathing):    Full Support     Admit Date: 7/31/2024       Hospital Day: 4  Referring Provider: Abebe Garzon DO      Patient personally seen and examined.  Complete chart including Consults, Notes, Operative Reports, Labs, Cardiology, and Radiology studies reviewed as able.    Chief complaint: Abnormal labs.    Subjective:  Erick Luong is a 69 y.o. male for whom we were consulted for evaluation and treatment of acute kidney injury. Baseline chronic kidney disease stage 3b. Baseline creatinine approximately 1.5-1.8. Follows in our office.  History of poorly controlled type 2 diabetes, hypertension, coronary artery disease, diabetic foot ulcer, obesity.  On 7/31 patient was found wandering at home, confused. Brought to ER for evaluation. Has a nonhealing left foot diabetic ulcer with serosanguineous drainage. Blood glucose level was >700 with A1c >12%. Initial creatinine was 2.67 and has steadily improved since he was admitted. Nephrology consulted on 8/01. Hospital course remarkable for improving renal function and improved blood glucose levels.     He was initially admitted to CCU now transferred to regular floor.  He feels weak today patient is sitting up and eating lunch    Allergies:  Cefepime, Bactrim [sulfamethoxazole-trimethoprim], Vancomycin, Zolpidem, and Metronidazole    Home Meds:  Medications Prior to Admission   Medication Sig Dispense Refill Last Dose    ascorbic acid (VITAMIN C) 1000 MG tablet Take 1 tablet by mouth Daily. 30 tablet 3     bumetanide (BUMEX) 1 MG tablet Take 1 tablet by mouth 2 (Two) Times a Day for 30 days. 60 tablet 0     busPIRone (BUSPAR) 10 MG tablet Take 1 tablet by mouth 2 (Two) Times a Day.       calcitriol (ROCALTROL) 0.5 MCG capsule Take 1 capsule by mouth Daily. 90 capsule 4     carvedilol  (COREG) 3.125 MG tablet Take 1 tablet twice a day by oral route. 60 tablet 4     donepezil (ARICEPT) 10 MG tablet Take 1 tablet by mouth Daily. 90 tablet 1     DULoxetine (CYMBALTA) 60 MG capsule Take 1 capsule by mouth Daily. 90 capsule 4     famotidine (PEPCID) 20 MG tablet Take 1 tablet twice a day by oral route. 180 tablet 4     Insulin Glargine (Lantus SoloStar) 100 UNIT/ML injection pen Inject 20 Units under the skin into the appropriate area as directed every night at bedtime. 15 mL 3     Insulin Regular Human, Conc, (HumuLIN R) 500 UNIT/ML solution pen-injector CONCENTRATED injection Inject 40 Units under the skin into the appropriate area as directed 2 (Two) Times a Day Before Meals. Inject 40 units under the skin in the the appropriate area with regular meals AND 40 units with large meals.       Iron-Vitamin C (Vitron-C)  MG tablet Take 1 tablet twice a day by oral route. 60 tablet 2     levocetirizine (XYZAL) 5 MG tablet Take 1 tablet by mouth every day at bedtime. 30 tablet 2     melatonin 3 MG tablet Take 2 tablets by mouth At Night As Needed for Sleep. 30 tablet 0     oxyCODONE (ROXICODONE) 10 MG tablet Take 1 tablet by mouth 2 (Two) Times a Day As Needed. Must last 30 days per md. 55 tablet 0     pantoprazole (PROTONIX) 40 MG EC tablet Take 1 tablet by mouth Every 12 (Twelve) Hours. 180 tablet 4     pregabalin (LYRICA) 100 MG capsule Take 1 capsule by mouth Daily.       pregabalin (LYRICA) 100 MG capsule Take 2 capsules by mouth every night at bedtime.       rosuvastatin (CRESTOR) 10 MG tablet Take 1 tablet by mouth Every Night. 30 tablet 2     sucralfate (CARAFATE) 1 g tablet Take 1 tablet by mouth 4 (Four) Times a Day before meals. 360 tablet 1     Diclofenac Sodium (VOLTAREN) 1 % gel gel Apply 2 g topically to the appropriate area as directed 4 (Four) Times a Day As Needed. 300 g 11     nitroglycerin (NITROSTAT) 0.4 MG SL tablet Place 1 tablet under the tongue Every 5 (Five) Minutes As Needed  for Chest Pain. Take no more than 3 doses in 15 minutes.       polyethylene glycol (MIRALAX) 17 GM/SCOOP powder Take 17 g by mouth Daily As Needed (constipation).       sennosides-docusate (PERICOLACE) 8.6-50 MG per tablet Take 1 tablet by mouth Every Night. Obtain OTC          Medicines:  Current Facility-Administered Medications   Medication Dose Route Frequency Provider Last Rate Last Admin    ascorbic acid (VITAMIN C) tablet 1,000 mg  1,000 mg Oral Daily Reuben Garza APRN   1,000 mg at 08/04/24 0819    sennosides-docusate (PERICOLACE) 8.6-50 MG per tablet 2 tablet  2 tablet Oral BID Lamine Figueroa APRN   2 tablet at 08/04/24 0819    And    polyethylene glycol (MIRALAX) packet 17 g  17 g Oral Daily PRN Lamine Figueroa APRN        And    bisacodyl (DULCOLAX) EC tablet 5 mg  5 mg Oral Daily PRN Lamine Figueroa APRN        And    bisacodyl (DULCOLAX) suppository 10 mg  10 mg Rectal Daily PRN Lamine Figueroa APRN   10 mg at 08/02/24 0941    calcitriol (ROCALTROL) capsule 0.25 mcg  0.25 mcg Oral Daily Brian Lomas MD   0.25 mcg at 08/04/24 0822    Calcium Replacement - Follow Nurse / BPA Driven Protocol   Does not apply PRN Abebe Garzon DO        dextrose (D50W) (25 g/50 mL) IV injection 10-50 mL  10-50 mL Intravenous Q15 Min PRN Abebe Garzon DO        dextrose (D50W) (25 g/50 mL) IV injection 25 g  25 g Intravenous Q15 Min PRN Lamine Figueroa APRN        dextrose (GLUTOSE) oral gel 15 g  15 g Oral Q15 Min PRN Lamine Figueroa APRN        donepezil (ARICEPT) tablet 10 mg  10 mg Oral Daily Reuben Garza APRN   10 mg at 08/04/24 0937    DULoxetine (CYMBALTA) DR capsule 60 mg  60 mg Oral Daily Reuben Garza APRN   60 mg at 08/04/24 0821    Enoxaparin Sodium (LOVENOX) syringe 135 mg  1 mg/kg Subcutaneous Q12H Abebe Garzon DO   135 mg at 08/04/24 0938    ertapenem (INVanz) 1,000 mg in sodium chloride 0.9 % 100 mL MBP  1,000 mg Intravenous Q24H Julia Adrian  mL/hr at  08/04/24 1357 1,000 mg at 08/04/24 1357    famotidine (PEPCID) tablet 20 mg  20 mg Oral Q12H Reuben Garza APRN   20 mg at 08/04/24 0819    ferric gluconate (FERRLECIT) 250 mg in sodium chloride 0.9 % 250 mL IVPB  250 mg Intravenous Daily Brian Lomas  mL/hr at 08/04/24 0937 250 mg at 08/04/24 0937    glucagon (GLUCAGEN) injection 1 mg  1 mg Intramuscular Q15 Min PRN Lamine Figueroa APRN        insulin glargine (LANTUS, SEMGLEE) injection 10 Units  10 Units Subcutaneous Daily Keren Jamison MD   10 Units at 08/04/24 0823    insulin regular (humuLIN R,novoLIN R) injection 3-14 Units  3-14 Units Subcutaneous 4x Daily AC & at Bedtime Keren Jamison MD   3 Units at 08/03/24 2033    Magnesium Standard Dose Replacement - Follow Nurse / BPA Driven Protocol   Does not apply PRN Abebe Garzon DO        melatonin tablet 2.5 mg  2.5 mg Oral Nightly Reuben Garza APRN   2.5 mg at 08/03/24 2033    nitroglycerin (NITROSTAT) SL tablet 0.4 mg  0.4 mg Sublingual Q5 Min PRN Lamine Figueroa APRN        ondansetron (ZOFRAN) injection 4 mg  4 mg Intravenous Q6H PRN Payam Keyes DO   4 mg at 08/04/24 1357    oxyCODONE (ROXICODONE) immediate release tablet 10 mg  10 mg Oral BID Lamine Figueroa APRN   10 mg at 08/04/24 0820    pantoprazole (PROTONIX) EC tablet 40 mg  40 mg Oral Q12H Reuben Garza APRN   40 mg at 08/04/24 0821    Pharmacy to Dose enoxaparin (LOVENOX)   Does not apply Continuous PRN Abebe Garzon DO        Phosphorus Replacement - Follow Nurse / BPA Driven Protocol   Does not apply PRN Abebe Garzon DO        polyethylene glycol (MIRALAX) packet 17 g  17 g Oral Daily Reuben Garza APRN   17 g at 08/04/24 0819    Potassium Replacement - Follow Nurse / BPA Driven Protocol   Does not apply TONNYN Abebe Garzno DO        pregabalin (LYRICA) capsule 100 mg  100 mg Oral Nightly Reuben Garza APRN   100 mg at 08/03/24 2033    pregabalin (LYRICA) capsule 50 mg  50 mg  Oral Daily Reuben Garza APRN   50 mg at 08/04/24 0821    rosuvastatin (CRESTOR) tablet 10 mg  10 mg Oral Nightly Reuben Garza APRN   10 mg at 08/03/24 2033    sodium bicarbonate tablet 650 mg  650 mg Oral TID Brian Lomas MD   650 mg at 08/04/24 0820    sodium chloride 0.9 % flush 10 mL  10 mL Intravenous Q12H Abebe Garzon,    10 mL at 08/04/24 0823    sodium chloride 0.9 % flush 10 mL  10 mL Intravenous PRN Abebe Garzon,         sodium chloride 0.9 % flush 10 mL  10 mL Intravenous Q12H Lamine Figueroa APRN   10 mL at 08/04/24 0823    sodium chloride 0.9 % flush 10 mL  10 mL Intravenous PRN Lamine Figueroa APRN        sodium chloride 0.9 % infusion 40 mL  40 mL Intravenous PRN Abebe Garzon,         sodium chloride 0.9 % infusion 40 mL  40 mL Intravenous PRN Lamine Figueroa APRN        sodium chloride 0.9 % infusion  50 mL/hr Intravenous Continuous Brian Lomas MD 50 mL/hr at 08/03/24 2129 50 mL/hr at 08/03/24 2129    sodium hypochlorite (DAKIN'S 1/4 STRENGTH) 0.125 % topical solution 0.125% solution   Topical Q12H Aby High APRN   Given at 08/03/24 2034       Past Medical History:  Past Medical History:   Diagnosis Date    Arthritis     Autonomic disease     CAD (coronary artery disease) 02/06/2017    Cervical radiculopathy 09/16/2021    Chronic constipation with acute exaccerbation 05/10/2021    Coronary artery disease     Degeneration of cervical intervertebral disc 08/11/2021    Diabetes mellitus     Diabetic foot ulcer 08/31/2020    Diabetic polyneuropathy associated with type 2 diabetes mellitus 01/18/2021    Elevated cholesterol     Gastroesophageal reflux disease 05/13/2019    Headache     HTN (hypertension), benign 05/03/2017    Hyperlipidemia     Hypertension     Mixed hyperlipidemia 02/07/2017    Multiple lung nodules 01/26/2020    5mm, 9 mm RLL identified 1/2020, not present 10/2019.    Myocardial infarction     Osteomyelitis 01/22/2020    Osteomyelitis of  fifth toe of right foot 10/07/2019    Pancreatitis     Persistent insomnia 01/20/2020    Renal disorder     Sleep apnea 02/06/2017    Sleep apnea with use of continuous positive airway pressure (CPAP)     NON-COMPLIANT    Slow transit constipation 01/16/2019    Spinal stenosis in cervical region 09/16/2021    Vitamin D deficiency 03/02/2021       Past Surgical History:  Past Surgical History:   Procedure Laterality Date    ABDOMINAL SURGERY      AMPUTATION FOOT / TOE Left 10/2021    5th digit     ANTERIOR CERVICAL DISCECTOMY W/ FUSION N/A 08/05/2022    Procedure: CERVICAL DISCECTOMY ANTERIOR WITH FUSION C5-6 with possible plating of C5-7 with neuromonitoring and 1 c-arm;  Surgeon: Karel Soliz MD;  Location: [facility] OR;  Service: Neurosurgery;  Laterality: N/A;    APPENDECTOMY      BACK SURGERY      CARDIAC CATHETERIZATION Left 02/08/2021    Procedure: Left Heart Cath w poss intervention left anatomical snuff box acess;  Surgeon: Omkar Charles DO;  Location: [facility] CATH INVASIVE LOCATION;  Service: Cardiology;  Laterality: Left;    CARDIAC SURGERY      CATARACT EXTRACTION      CERVICAL SPINE SURGERY      COLONOSCOPY N/A 01/31/2017    Normal exam repeat in 5 years    COLONOSCOPY N/A 02/11/2019    Mild acute inflammation    COLONOSCOPY N/A 04/07/2024    2 areas at 10 and 20 cm with friability ulceration 2 clips placed at 20 cm and 4 clips at 10 cm poor prep normal mucosa,mild eroisions and ulcerations in visible vessels    COLONOSCOPY N/A 7/1/2024    Procedure: COLONOSCOPY WITH ANESTHESIA;  Surgeon: Arsalan Lorenzo DO;  Location: [facility] ENDOSCOPY;  Service: Gastroenterology;  Laterality: N/A;  pre op constipation/diarrhea  post poor prep  pcp Del Shetty    COLONOSCOPY N/A 7/2/2024    Procedure: COLONOSCOPY WITH ANESTHESIA;  Surgeon: Agapito Christopher MD;  Location: [facility] ENDOSCOPY;  Service: Gastroenterology;  Laterality: N/A;  pre rectal bleeding  post poor prep  pcp Del Shetty MD     COLONOSCOPY W/ POLYPECTOMY  2014    Hyperplastic polyp    CORONARY ARTERY BYPASS GRAFT  10/2015    ENDOSCOPY  2011    Gastritis with hemorrhage    ENDOSCOPY N/A 2017    Normal exam    ENDOSCOPY N/A 2019    Gastritis    ENDOSCOPY N/A 2020    Non-erosive gastritis with hemorrhage    ENDOSCOPY N/A 02/10/2021    Esophagitis    ENDOSCOPY N/A 2024    There were esophageal mucosal changes suspicious for short-segment Low's esophagus present in the distal esophagus. The maximum longitudinal extent of these mucosal changes was 2 cm in length. Mucosa was biopsied with a cold forceps for histologyDistal esophagus, biopsies: Mild chronic active esophagogastritis. No evidence of intestinal metaplasia, dysplasia. Antrum, bx, Mild chronic gastritis    FOOT SURGERY Left     INCISION AND DRAINAGE OF WOUND Left 2007    spider bite       Family History  Family History   Problem Relation Age of Onset    Colon cancer Father     Heart disease Father     Colon cancer Sister     Colon polyps Sister     Alzheimer's disease Mother     Coronary artery disease Sister     Coronary artery disease Sister        Social History  Social History     Socioeconomic History    Marital status:    Tobacco Use    Smoking status: Former     Current packs/day: 0.00     Types: Cigarettes     Quit date:      Years since quittin.6    Smokeless tobacco: Never    Tobacco comments:     [place] [employer]   Vaping Use    Vaping status: Never Used   Substance and Sexual Activity    Alcohol use: No    Drug use: No    Sexual activity: Defer       Review of Systems:  History obtained from chart review and the patient  General ROS: No fever or chills  Respiratory ROS: No cough, shortness of breath, wheezing  Cardiovascular ROS: No chest pain or palpitations  Gastrointestinal ROS: No abdominal pain or melena  Genito-Urinary ROS: No dysuria or hematuria  Psych ROS: No anxiety and depression  14 point ROS  reviewed with the patient and negative except as noted above and in the HPI unless unable to obtain.    Objective:  Patient Vitals for the past 24 hrs:   BP Temp Temp src Pulse Resp SpO2 Weight   08/04/24 1231 145/63 97.3 °F (36.3 °C) Oral 81 18 99 % --   08/04/24 0808 123/57 97.7 °F (36.5 °C) Oral 79 18 96 % --   08/04/24 0518 130/66 97.5 °F (36.4 °C) Oral 82 16 96 % 131 kg (289 lb)   08/04/24 0021 126/90 98 °F (36.7 °C) Oral 84 16 100 % --   08/03/24 1916 121/93 97.5 °F (36.4 °C) Oral 83 16 100 % --   08/03/24 1650 141/65 97.7 °F (36.5 °C) Oral 79 16 100 % --       Intake/Output Summary (Last 24 hours) at 8/4/2024 1441  Last data filed at 8/4/2024 1231  Gross per 24 hour   Intake 1810 ml   Output 400 ml   Net 1410 ml     General: awake/alert   HEENT: Normocephalic atraumatic head  Neck: Supple with no JVD or carotid bruits.  Chest:  clear to auscultation bilaterally without respiratory distress  CVS: regular rate and rhythm  Abdominal: soft, nontender, positive bowel sounds  Extremities: Left foot ulcer at the plantar aspect  Skin:  left foot ulcer and warm and dry without rash      Labs:  Results from last 7 days   Lab Units 08/04/24 0615 08/03/24 0605 08/02/24  0558   WBC 10*3/mm3 5.73 5.75 9.76   HEMOGLOBIN g/dL 9.5* 10.3* 10.0*   HEMATOCRIT % 30.5* 33.2* 31.8*   PLATELETS 10*3/mm3 201 215 219         Results from last 7 days   Lab Units 08/04/24 0615 08/03/24  0605 08/02/24  0558 07/31/24 2030 07/31/24  1849   SODIUM mmol/L 145 143 141   < > 132*   POTASSIUM mmol/L 3.9 3.8 3.8   < > 4.5   CHLORIDE mmol/L 111* 110* 108*   < > 97*   CO2 mmol/L 21.0* 19.0* 19.0*   < > 19.0*   BUN mg/dL 24* 31* 37*   < > 55*   CREATININE mg/dL 1.60* 1.82* 1.98*   < > 2.67*   CALCIUM mg/dL 8.5* 8.3* 8.5*   < > 9.0   EGFR mL/min/1.73 46.6* 40.0* 36.1*   < > 25.2*   BILIRUBIN mg/dL 0.3 0.3  --   --  0.6   ALK PHOS U/L 95 88  --   --  101   ALT (SGPT) U/L 9 7  --   --  8   AST (SGOT) U/L 15 12  --   --  11   GLUCOSE mg/dL 97 125*  157*   < > 704*    < > = values in this interval not displayed.       Radiology:   Imaging Results (Last 72 Hours)       ** No results found for the last 72 hours. **            Culture:  Blood Culture   Date Value Ref Range Status   07/31/2024 No growth at 24 hours  Preliminary   07/31/2024 Staphylococcus, coagulase negative (C)  Final         Assessment    Acute kidney injury/improving  Acute tubular necrosis.  Stage IIIb CKD baseline  Type II diabetic nephropathy  Benign essential hypertension  Sepsis related to left diabetic foot ulcer  Anemia of CKD  Morbid abdominal obesity    Plan:  Continue IV antibiotics  Continue to monitor renal function  Intravenous ferric gluconate  Plan was discussed with the patient      Brian Lomas MD  8/4/2024  14:41 CDT      Electronically signed by Brian Lomas MD at 08/04/24 1442       Payam Keyes DO at 08/04/24 1358              [facility]  INPATIENT PROGRESS NOTE    Patient Name: Erick Luong  Date of Admission: 7/31/2024  Today's Date: 08/04/24  Length of Stay: 4  Primary Care Physician: Del Shetty MD    Subjective   Chief Complaint: Generalized weakness  HPI     The patient is improving slowly.  He is currently being cleaned up by the nursing staff after a bowel meant accident in his room.  Urine culture positive for ESBL E. coli and infectious diseases has made antibiotic changes to ertapenem.  Renal function is at baseline with creatinine 1.6.  The patient remains in sinus rhythm and continues to receive wound care for his heel ulcer.    Review of Systems   All pertinent negatives and positives are as above. All other systems have been reviewed and are negative unless otherwise stated.     Objective    Temp:  [97.3 °F (36.3 °C)-98 °F (36.7 °C)] 97.3 °F (36.3 °C)  Heart Rate:  [79-84] 81  Resp:  [16-18] 18  BP: (121-145)/(57-93) 145/63  Physical Exam    Constitutional:       Appearance: Normal appearance.  He is obese.   HENT:      Head: Normocephalic and atraumatic.      Right Ear: External ear normal.      Left Ear: External ear normal.      Nose: Nose normal.      Mouth/Throat:      Mouth: Mucous membranes are moist.      Pharynx: Oropharynx is clear.   Eyes:      General: No scleral icterus.     Conjunctiva/sclera: Conjunctivae normal.   Cardiovascular:      Rate and Rhythm: Normal rate and regular rhythm.      Pulses: Normal pulses.      Heart sounds: Normal heart sounds.   Pulmonary:      Effort: Pulmonary effort is normal. No respiratory distress.      Breath sounds: Normal breath sounds.   Abdominal:      General: Bowel sounds are normal.      Palpations: Abdomen is soft. There is no mass.      Tenderness: There is no abdominal tenderness.   Musculoskeletal:         General: Normal range of motion.      Right lower leg: Edema present.      Left lower leg: Edema present.   Skin:     General: Skin is warm and dry.      Comments: Left heel wound covered with appropriate dressing.   Neurological:      General: No focal deficit present.      Mental Status: He is alert and oriented to person, place, and time.   Psychiatric:         Mood and Affect: Mood normal.         Judgment: Judgment normal.     Results Review:  I have reviewed the labs, radiology results, and diagnostic studies.    Laboratory Data:   Results from last 7 days   Lab Units 08/04/24  0615 08/03/24  0605 08/02/24  0558   WBC 10*3/mm3 5.73 5.75 9.76   HEMOGLOBIN g/dL 9.5* 10.3* 10.0*   HEMATOCRIT % 30.5* 33.2* 31.8*   PLATELETS 10*3/mm3 201 215 219        Results from last 7 days   Lab Units 08/04/24  0615 08/03/24  0605 08/02/24  0558 07/31/24  2030 07/31/24  1849   SODIUM mmol/L 145 143 141   < > 132*   POTASSIUM mmol/L 3.9 3.8 3.8   < > 4.5   CHLORIDE mmol/L 111* 110* 108*   < > 97*   CO2 mmol/L 21.0* 19.0* 19.0*   < > 19.0*   BUN mg/dL 24* 31* 37*   < > 55*   CREATININE mg/dL 1.60* 1.82* 1.98*   < > 2.67*   CALCIUM mg/dL 8.5* 8.3* 8.5*   < > 9.0    BILIRUBIN mg/dL 0.3 0.3  --   --  0.6   ALK PHOS U/L 95 88  --   --  101   ALT (SGPT) U/L 9 7  --   --  8   AST (SGOT) U/L 15 12  --   --  11   GLUCOSE mg/dL 97 125* 157*   < > 704*    < > = values in this interval not displayed.       Culture Data:   Blood Culture   Date Value Ref Range Status   07/31/2024 No growth at 3 days  Preliminary   07/31/2024 Staphylococcus, coagulase negative (C)  Final     Urine Culture   Date Value Ref Range Status   07/31/2024 >100,000 CFU/mL Escherichia coli ESBL (A)  Final     Wound Culture   Date Value Ref Range Status   08/02/2024 (A)  Final    Light growth (2+) Streptococcus agalactiae (Group B)     Comment:       This organism is considered to be universally susceptible to penicillin.  No further antibiotic testing will be performed. If Clindamycin or Erythromycin is the drug of choice, notify the laboratory within 7 days to request susceptibility testing.   08/02/2024 (A)  Final    Light growth (2+) Streptococcus agalactiae (Group B)     Comment:       This organism is considered to be universally susceptible to penicillin.  No further antibiotic testing will be performed. If Clindamycin or Erythromycin is the drug of choice, notify the laboratory within 7 days to request susceptibility testing.       Radiology Data:   Imaging Results (Last 24 Hours)       ** No results found for the last 24 hours. **            I have reviewed the patient's current medications.     Assessment/Plan   Assessment  Active Hospital Problems    Diagnosis     **DKA, type 2, not at goal     E. coli UTI, ESBL     Atrial fibrillation     Chronic heart failure with preserved ejection fraction (HFpEF)     Chronic diastolic heart failure     GERD without esophagitis     Diabetic ulcer of left foot associated with type 2 diabetes mellitus        Treatment Plan  Continue wound care  IV antibiotics per infectious diseases  PT/OT evaluations for discharge placement assessment  Ambulation encouraged      Medical Decision Making  Number and Complexity of problems:   3 acute, high complexity problems  4+ chronic, moderate complexity problems     Differential Diagnosis: None     Conditions and Status        Condition is improving.     [facility] Data  External documents reviewed: Care Everywhere  Cardiac tracing (EKG, telemetry) interpretation: See HPI  Radiology interpretation: See HPI  Labs reviewed: See HPI  Any tests that were considered but not ordered: None     Decision rules/scores evaluated (example [id number], Wells, etc): None     Discussed with: The patient     Care Planning  Shared decision making: Patient  Code status and discussions: Full code     Disposition  Social Determinants of Health that impact treatment or disposition: None  I expect the patient to be discharged to home or SNF in 2-3 days.     Electronically signed by Payam Keyes DO, 08/04/24, 13:58 CDT.      Electronically signed by Payam Keyes DO at 08/04/24 0169

## 2024-08-05 NOTE — PLAN OF CARE
Goal Outcome Evaluation:           Progress: (P) no change  Outcome Evaluation: (P) A&O. Up with assist. Room air. C/o pain and nausea; see MAR. Scheduled pain med per order. IVF. IV abx. Consistent carb, cardiac diet. Voiding per urinal. Accuchecks. Lovenox and SCDs. L foot drsng will be changed later this shift per order. Safety maintained.

## 2024-08-05 NOTE — NURSING NOTE
Unsuccessful IV attempt to right hand with 22ga by this nurse, unsuccessful IV attempt to left forearm and left hand by Tahira LEAL.

## 2024-08-05 NOTE — PROGRESS NOTES
Nephrology ([facility]) Progress Note      Patient:  Erick Luong  YOB: 1955  Date of Service: 8/5/2024  MRN: [id number]   Acct: [id number]   Primary Care Physician: Del Shetty MD  Advance Directive:   Code Status and Medical Interventions: CPR (Attempt to Resuscitate); Full Support   Ordered at: 08/01/24 0053     Level Of Support Discussed With:    Patient     Code Status (Patient has no pulse and is not breathing):    CPR (Attempt to Resuscitate)     Medical Interventions (Patient has pulse or is breathing):    Full Support     Admit Date: 7/31/2024       Hospital Day: 5  Referring Provider: Abebe Garzon DO      Patient personally seen and examined.  Complete chart including Consults, Notes, Operative Reports, Labs, Cardiology, and Radiology studies reviewed as able.        Subjective:  Erick Luong is a 69 y.o. male for whom we were consulted for evaluation and treatment of acute kidney injury. Baseline chronic kidney disease stage 3b. Baseline creatinine approximately 1.5-1.8. Follows in our office.  History of poorly controlled type 2 diabetes, hypertension, coronary artery disease, diabetic foot ulcer, obesity.  On 7/31 patient was found wandering at home, confused. Brought to ER for evaluation. Has a nonhealing left foot diabetic ulcer with serosanguineous drainage. Blood glucose level was >700 with A1c >12%. Initial creatinine was 2.67 and has steadily improved since he was admitted. Nephrology consulted on 8/01. Hospital course remarkable for improving renal function and improved blood glucose levels. Moved to medical floor    Today is awake and alert. Remains weak but otherwise feeling better. No new overnight issues. Urine output nonoliguric    Allergies:  Cefepime, Bactrim [sulfamethoxazole-trimethoprim], Vancomycin, Zolpidem, and Metronidazole    Home Meds:  Medications Prior to Admission   Medication Sig Dispense Refill Last Dose    ascorbic acid  (VITAMIN C) 1000 MG tablet Take 1 tablet by mouth Daily. 30 tablet 3     bumetanide (BUMEX) 1 MG tablet Take 1 tablet by mouth 2 (Two) Times a Day for 30 days. 60 tablet 0     busPIRone (BUSPAR) 10 MG tablet Take 1 tablet by mouth 2 (Two) Times a Day.       calcitriol (ROCALTROL) 0.5 MCG capsule Take 1 capsule by mouth Daily. 90 capsule 4     carvedilol (COREG) 3.125 MG tablet Take 1 tablet twice a day by oral route. 60 tablet 4     donepezil (ARICEPT) 10 MG tablet Take 1 tablet by mouth Daily. 90 tablet 1     DULoxetine (CYMBALTA) 60 MG capsule Take 1 capsule by mouth Daily. 90 capsule 4     famotidine (PEPCID) 20 MG tablet Take 1 tablet twice a day by oral route. 180 tablet 4     Insulin Glargine (Lantus SoloStar) 100 UNIT/ML injection pen Inject 20 Units under the skin into the appropriate area as directed every night at bedtime. 15 mL 3     Insulin Regular Human, Conc, (HumuLIN R) 500 UNIT/ML solution pen-injector CONCENTRATED injection Inject 40 Units under the skin into the appropriate area as directed 2 (Two) Times a Day Before Meals. Inject 40 units under the skin in the the appropriate area with regular meals AND 40 units with large meals.       Iron-Vitamin C (Vitron-C)  MG tablet Take 1 tablet twice a day by oral route. 60 tablet 2     levocetirizine (XYZAL) 5 MG tablet Take 1 tablet by mouth every day at bedtime. 30 tablet 2     melatonin 3 MG tablet Take 2 tablets by mouth At Night As Needed for Sleep. 30 tablet 0     oxyCODONE (ROXICODONE) 10 MG tablet Take 1 tablet by mouth 2 (Two) Times a Day As Needed. Must last 30 days per md. 55 tablet 0     pantoprazole (PROTONIX) 40 MG EC tablet Take 1 tablet by mouth Every 12 (Twelve) Hours. 180 tablet 4     pregabalin (LYRICA) 100 MG capsule Take 1 capsule by mouth Daily.       pregabalin (LYRICA) 100 MG capsule Take 2 capsules by mouth every night at bedtime.       rosuvastatin (CRESTOR) 10 MG tablet Take 1 tablet by mouth Every Night. 30 tablet 2      sucralfate (CARAFATE) 1 g tablet Take 1 tablet by mouth 4 (Four) Times a Day before meals. 360 tablet 1     Diclofenac Sodium (VOLTAREN) 1 % gel gel Apply 2 g topically to the appropriate area as directed 4 (Four) Times a Day As Needed. 300 g 11     nitroglycerin (NITROSTAT) 0.4 MG SL tablet Place 1 tablet under the tongue Every 5 (Five) Minutes As Needed for Chest Pain. Take no more than 3 doses in 15 minutes.       polyethylene glycol (MIRALAX) 17 GM/SCOOP powder Take 17 g by mouth Daily As Needed (constipation).       sennosides-docusate (PERICOLACE) 8.6-50 MG per tablet Take 1 tablet by mouth Every Night. Obtain OTC          Medicines:  Current Facility-Administered Medications   Medication Dose Route Frequency Provider Last Rate Last Admin    ascorbic acid (VITAMIN C) tablet 1,000 mg  1,000 mg Oral Daily Reuben Garza APRN   1,000 mg at 08/05/24 0834    sennosides-docusate (PERICOLACE) 8.6-50 MG per tablet 2 tablet  2 tablet Oral BID Lamine Figueroa APRN   2 tablet at 08/04/24 0819    And    polyethylene glycol (MIRALAX) packet 17 g  17 g Oral Daily PRN Lamine Figueroa APRN        And    bisacodyl (DULCOLAX) EC tablet 5 mg  5 mg Oral Daily PRN Lamine Figueroa APRN        And    bisacodyl (DULCOLAX) suppository 10 mg  10 mg Rectal Daily PRN Lamine Figueroa APRN   10 mg at 08/02/24 0941    Calcium Replacement - Follow Nurse / BPA Driven Protocol   Does not apply PRN Abebe Garzon DO        dextrose (D50W) (25 g/50 mL) IV injection 10-50 mL  10-50 mL Intravenous Q15 Min PRN Abebe Garzon DO        dextrose (D50W) (25 g/50 mL) IV injection 25 g  25 g Intravenous Q15 Min PRN Lamine Figueroa APRN        dextrose (GLUTOSE) oral gel 15 g  15 g Oral Q15 Min PRN Lamine Figueroa APRN        donepezil (ARICEPT) tablet 10 mg  10 mg Oral Daily Reuben Garza APRN   10 mg at 08/05/24 0834    DULoxetine (CYMBALTA) DR capsule 60 mg  60 mg Oral Daily Reuben Garza APRN   60 mg at 08/05/24 0834     Enoxaparin Sodium (LOVENOX) syringe 135 mg  1 mg/kg Subcutaneous Q12H Abebe Garzon DO   135 mg at 08/05/24 0833    ertapenem (INVanz) 1,000 mg in sodium chloride 0.9 % 100 mL MBP  1,000 mg Intravenous Q24H Julia Adrian  mL/hr at 08/04/24 1357 1,000 mg at 08/04/24 1357    famotidine (PEPCID) tablet 20 mg  20 mg Oral Q12H Reuben Garza APRN   20 mg at 08/05/24 0833    ferric gluconate (FERRLECIT) 250 mg in sodium chloride 0.9 % 250 mL IVPB  250 mg Intravenous Daily Brian Lomas  mL/hr at 08/05/24 0833 250 mg at 08/05/24 0833    glucagon (GLUCAGEN) injection 1 mg  1 mg Intramuscular Q15 Min PRN Lamine Figueroa APRN        insulin glargine (LANTUS, SEMGLEE) injection 10 Units  10 Units Subcutaneous Daily Keren Jamison MD   10 Units at 08/04/24 0823    insulin regular (humuLIN R,novoLIN R) injection 3-14 Units  3-14 Units Subcutaneous 4x Daily AC & at Bedtime Keren Jamison MD   3 Units at 08/03/24 2033    Magnesium Standard Dose Replacement - Follow Nurse / BPA Driven Protocol   Does not apply PRN Abebe Garzon DO        melatonin tablet 2.5 mg  2.5 mg Oral Nightly Reuben Garza APRN   2.5 mg at 08/04/24 2237    nitroglycerin (NITROSTAT) SL tablet 0.4 mg  0.4 mg Sublingual Q5 Min PRN Lamine Figueroa APRN        ondansetron (ZOFRAN) injection 4 mg  4 mg Intravenous Q6H PRN Payam Keyes DO   4 mg at 08/05/24 0800    oxyCODONE (ROXICODONE) immediate release tablet 10 mg  10 mg Oral BID Lamine Figueroa APRN   10 mg at 08/05/24 0834    pantoprazole (PROTONIX) EC tablet 40 mg  40 mg Oral Q12H Reuben Garza APRN   40 mg at 08/05/24 0833    Pharmacy to Dose enoxaparin (LOVENOX)   Does not apply Continuous PRN Abebe Garzon DO        Phosphorus Replacement - Follow Nurse / BPA Driven Protocol   Does not apply PRN Abebe Garzon DO        polyethylene glycol (MIRALAX) packet 17 g  17 g Oral Daily Reuben Garza APRN   17 g at 08/04/24 0819    Potassium  Replacement - Follow Nurse / BPA Driven Protocol   Does not apply PRN Abebe Garzon DO        pregabalin (LYRICA) capsule 100 mg  100 mg Oral Nightly Reuben Garza APRN   100 mg at 08/04/24 2237    pregabalin (LYRICA) capsule 50 mg  50 mg Oral Daily Reuben Garza APRN   50 mg at 08/05/24 0834    rosuvastatin (CRESTOR) tablet 10 mg  10 mg Oral Nightly Reuben Garza APRN   10 mg at 08/04/24 2237    sodium bicarbonate tablet 650 mg  650 mg Oral TID Brian Lomas MD   650 mg at 08/05/24 0834    sodium chloride 0.9 % flush 10 mL  10 mL Intravenous Q12H Abebe Garzon DO   10 mL at 08/05/24 0841    sodium chloride 0.9 % flush 10 mL  10 mL Intravenous PRN Abebe Garzon,         sodium chloride 0.9 % flush 10 mL  10 mL Intravenous Q12H Lamine Figueroa APRN   10 mL at 08/05/24 0841    sodium chloride 0.9 % flush 10 mL  10 mL Intravenous PRN Lamine Figueroa APRN        sodium chloride 0.9 % infusion 40 mL  40 mL Intravenous PRN Abebe Garzon DO        sodium chloride 0.9 % infusion 40 mL  40 mL Intravenous PRN Lamine Figueroa APRN        sodium chloride 0.9 % infusion  50 mL/hr Intravenous Continuous Brian Lomas MD   Stopped at 08/04/24 2235    sodium hypochlorite (DAKIN'S 1/4 STRENGTH) 0.125 % topical solution 0.125% solution   Topical Q12H Aby High APRN   Given at 08/03/24 2034       Past Medical History:  Past Medical History:   Diagnosis Date    Arthritis     Autonomic disease     CAD (coronary artery disease) 02/06/2017    Cervical radiculopathy 09/16/2021    Chronic constipation with acute exaccerbation 05/10/2021    Coronary artery disease     Degeneration of cervical intervertebral disc 08/11/2021    Diabetes mellitus     Diabetic foot ulcer 08/31/2020    Diabetic polyneuropathy associated with type 2 diabetes mellitus 01/18/2021    Elevated cholesterol     Gastroesophageal reflux disease 05/13/2019    Headache     HTN (hypertension), benign 05/03/2017     Hyperlipidemia     Hypertension     Mixed hyperlipidemia 02/07/2017    Multiple lung nodules 01/26/2020    5mm, 9 mm RLL identified 1/2020, not present 10/2019.    Myocardial infarction     Osteomyelitis 01/22/2020    Osteomyelitis of fifth toe of right foot 10/07/2019    Pancreatitis     Persistent insomnia 01/20/2020    Renal disorder     Sleep apnea 02/06/2017    Sleep apnea with use of continuous positive airway pressure (CPAP)     NON-COMPLIANT    Slow transit constipation 01/16/2019    Spinal stenosis in cervical region 09/16/2021    Vitamin D deficiency 03/02/2021       Past Surgical History:  Past Surgical History:   Procedure Laterality Date    ABDOMINAL SURGERY      AMPUTATION FOOT / TOE Left 10/2021    5th digit     ANTERIOR CERVICAL DISCECTOMY W/ FUSION N/A 08/05/2022    Procedure: CERVICAL DISCECTOMY ANTERIOR WITH FUSION C5-6 with possible plating of C5-7 with neuromonitoring and 1 c-arm;  Surgeon: Karel Soliz MD;  Location: [facility] OR;  Service: Neurosurgery;  Laterality: N/A;    APPENDECTOMY      BACK SURGERY      CARDIAC CATHETERIZATION Left 02/08/2021    Procedure: Left Heart Cath w poss intervention left anatomical snuff box acess;  Surgeon: Omkar Charles DO;  Location: [facility] CATH INVASIVE LOCATION;  Service: Cardiology;  Laterality: Left;    CARDIAC SURGERY      CATARACT EXTRACTION      CERVICAL SPINE SURGERY      COLONOSCOPY N/A 01/31/2017    Normal exam repeat in 5 years    COLONOSCOPY N/A 02/11/2019    Mild acute inflammation    COLONOSCOPY N/A 04/07/2024    2 areas at 10 and 20 cm with friability ulceration 2 clips placed at 20 cm and 4 clips at 10 cm poor prep normal mucosa,mild eroisions and ulcerations in visible vessels    COLONOSCOPY N/A 7/1/2024    Procedure: COLONOSCOPY WITH ANESTHESIA;  Surgeon: Arsalan Lorenzo DO;  Location: [facility] ENDOSCOPY;  Service: Gastroenterology;  Laterality: N/A;  pre op constipation/diarrhea  post poor prep  pcp Del Shetty     COLONOSCOPY N/A 2024    Procedure: COLONOSCOPY WITH ANESTHESIA;  Surgeon: Agapito Christopher MD;  Location: [facility] ENDOSCOPY;  Service: Gastroenterology;  Laterality: N/A;  pre rectal bleeding  post poor prep  pcp Del Shetty MD    COLONOSCOPY W/ POLYPECTOMY  2014    Hyperplastic polyp    CORONARY ARTERY BYPASS GRAFT  10/2015    ENDOSCOPY  2011    Gastritis with hemorrhage    ENDOSCOPY N/A 2017    Normal exam    ENDOSCOPY N/A 2019    Gastritis    ENDOSCOPY N/A 2020    Non-erosive gastritis with hemorrhage    ENDOSCOPY N/A 02/10/2021    Esophagitis    ENDOSCOPY N/A 2024    There were esophageal mucosal changes suspicious for short-segment Lwo's esophagus present in the distal esophagus. The maximum longitudinal extent of these mucosal changes was 2 cm in length. Mucosa was biopsied with a cold forceps for histologyDistal esophagus, biopsies: Mild chronic active esophagogastritis. No evidence of intestinal metaplasia, dysplasia. Antrum, bx, Mild chronic gastritis    FOOT SURGERY Left     INCISION AND DRAINAGE OF WOUND Left 2007    spider bite       Family History  Family History   Problem Relation Age of Onset    Colon cancer Father     Heart disease Father     Colon cancer Sister     Colon polyps Sister     Alzheimer's disease Mother     Coronary artery disease Sister     Coronary artery disease Sister        Social History  Social History     Socioeconomic History    Marital status:    Tobacco Use    Smoking status: Former     Current packs/day: 0.00     Types: Cigarettes     Quit date:      Years since quittin.6    Smokeless tobacco: Never    Tobacco comments:     smoked in highschool   Vaping Use    Vaping status: Never Used   Substance and Sexual Activity    Alcohol use: No    Drug use: No    Sexual activity: Defer       Review of Systems:  History obtained from chart review and the patient  General ROS: No fever or chills  Respiratory ROS: No  cough, shortness of breath, wheezing  Cardiovascular ROS: No chest pain or palpitations  Gastrointestinal ROS: No abdominal pain or melena  Genito-Urinary ROS: No dysuria or hematuria  Psych ROS: No anxiety and depression  14 point ROS reviewed with the patient and negative except as noted above and in the HPI unless unable to obtain.    Objective:  Patient Vitals for the past 24 hrs:   BP Temp Temp src Pulse Resp SpO2 Weight   08/05/24 0455 -- -- -- -- -- -- 131 kg (289 lb)   08/05/24 0439 121/55 97.7 °F (36.5 °C) Oral 69 18 97 % --   08/05/24 0047 139/60 97.7 °F (36.5 °C) Oral 69 18 96 % --   08/04/24 1900 129/72 97.4 °F (36.3 °C) Oral 70 18 98 % --   08/04/24 1648 156/78 97.4 °F (36.3 °C) Oral 78 18 90 % --   08/04/24 1231 145/63 97.3 °F (36.3 °C) Oral 81 18 99 % --       Intake/Output Summary (Last 24 hours) at 8/5/2024 0951  Last data filed at 8/5/2024 0834  Gross per 24 hour   Intake 2324 ml   Output 1000 ml   Net 1324 ml     General: awake/alert   Chest:  clear to auscultation bilaterally without respiratory distress  CVS: regular rate and rhythm  Abdominal: soft, nontender, positive bowel sounds  Extremities: no cyanosis or edema  Skin:  left foot ulcer and warm and dry without rash      Labs:  Results from last 7 days   Lab Units 08/05/24 0444 08/04/24  0615 08/03/24  0605   WBC 10*3/mm3 5.89 5.73 5.75   HEMOGLOBIN g/dL 9.5* 9.5* 10.3*   HEMATOCRIT % 30.1* 30.5* 33.2*   PLATELETS 10*3/mm3 210 201 215         Results from last 7 days   Lab Units 08/05/24 0444 08/04/24  0615 08/03/24  0605   SODIUM mmol/L 144 145 143   POTASSIUM mmol/L 3.9 3.9 3.8   CHLORIDE mmol/L 112* 111* 110*   CO2 mmol/L 21.0* 21.0* 19.0*   BUN mg/dL 20 24* 31*   CREATININE mg/dL 1.47* 1.60* 1.82*   CALCIUM mg/dL 8.3* 8.5* 8.3*   EGFR mL/min/1.73 51.6* 46.6* 40.0*   BILIRUBIN mg/dL 0.3 0.3 0.3   ALK PHOS U/L 102 95 88   ALT (SGPT) U/L 9 9 7   AST (SGOT) U/L 16 15 12   GLUCOSE mg/dL 89 97 125*       Radiology:   Imaging Results (Last 72  Hours)       ** No results found for the last 72 hours. **            Culture:  Blood Culture   Date Value Ref Range Status   07/31/2024 No growth at 24 hours  Preliminary   07/31/2024 Staphylococcus, coagulase negative (C)  Final         Assessment    Acute kidney injury, ATN--resolving  Baseline chronic kidney disease stage 3b  Type 2 diabetes  Hypertension  Sepsis related to diabetic foot ulcer  Anemia of CKD  Obesity     Plan:   Wean IV fluids  Renal function improved, back to baseline level  Monitor labs      Stewart Alvarez, APRN  8/5/2024  09:51 CDT

## 2024-08-05 NOTE — PROGRESS NOTES
[facility] Medicine Services  INPATIENT PROGRESS NOTE    Patient Name: Erick Luong  Date of Admission: 7/31/2024  Today's Date: 08/05/24  Length of Stay: 5  Primary Care Physician: Del Shetty MD    Subjective   Chief Complaint: Weak    HPI   Patient ambulatory with therapy this AM  Per therapy marked improvement in gait over last 24 hours.   Mood doing ok.  Wants soda to drink.  Does not want to go to SNF or have HH.       Review of Systems   All pertinent negatives and positives are as above. All other systems have been reviewed and are negative unless otherwise stated.     Objective    Temp:  [97.3 °F (36.3 °C)-97.7 °F (36.5 °C)] 97.7 °F (36.5 °C)  Heart Rate:  [69-81] 69  Resp:  [18] 18  BP: (121-156)/(55-78) 121/55  Physical Exam  Vitals and nursing note reviewed.   Constitutional:       Appearance: Normal appearance.   HENT:      Head: Normocephalic and atraumatic.      Right Ear: External ear normal.      Left Ear: External ear normal.      Nose: Nose normal.      Mouth/Throat:      Mouth: Mucous membranes are moist.   Eyes:      Extraocular Movements: Extraocular movements intact.      Conjunctiva/sclera: Conjunctivae normal.      Pupils: Pupils are equal, round, and reactive to light.   Cardiovascular:      Rate and Rhythm: Normal rate and regular rhythm.      Pulses: Normal pulses.      Heart sounds: No murmur heard.     No friction rub. No gallop.   Pulmonary:      Effort: Pulmonary effort is normal.      Breath sounds: Normal breath sounds.   Abdominal:      General: Bowel sounds are normal.      Palpations: Abdomen is soft.   Musculoskeletal:         General: Normal range of motion.      Cervical back: Normal range of motion and neck supple.      Comments: Patient with dressing intact to left foot.     Skin:     General: Skin is warm and dry.      Capillary Refill: Capillary refill takes less than 2 seconds.   Neurological:      General: No focal deficit  present.      Mental Status: He is alert and oriented to person, place, and time.      Cranial Nerves: No cranial nerve deficit.   Psychiatric:         Mood and Affect: Mood normal.         Behavior: Behavior normal.             Results Review:  I have reviewed the labs, radiology results, and diagnostic studies.    Laboratory Data:   Results from last 7 days   Lab Units 08/05/24 0444 08/04/24  0615 08/03/24  0605   WBC 10*3/mm3 5.89 5.73 5.75   HEMOGLOBIN g/dL 9.5* 9.5* 10.3*   HEMATOCRIT % 30.1* 30.5* 33.2*   PLATELETS 10*3/mm3 210 201 215        Results from last 7 days   Lab Units 08/05/24 0444 08/04/24  0615 08/03/24  0605   SODIUM mmol/L 144 145 143   POTASSIUM mmol/L 3.9 3.9 3.8   CHLORIDE mmol/L 112* 111* 110*   CO2 mmol/L 21.0* 21.0* 19.0*   BUN mg/dL 20 24* 31*   CREATININE mg/dL 1.47* 1.60* 1.82*   CALCIUM mg/dL 8.3* 8.5* 8.3*   BILIRUBIN mg/dL 0.3 0.3 0.3   ALK PHOS U/L 102 95 88   ALT (SGPT) U/L 9 9 7   AST (SGOT) U/L 16 15 12   GLUCOSE mg/dL 89 97 125*       Culture Data:   Blood Culture   Date Value Ref Range Status   07/31/2024 No growth at 4 days  Preliminary   07/31/2024 Staphylococcus, coagulase negative (C)  Final     Urine Culture   Date Value Ref Range Status   07/31/2024 >100,000 CFU/mL Escherichia coli ESBL (A)  Final     Wound Culture   Date Value Ref Range Status   08/02/2024 (A)  Final    Light growth (2+) Streptococcus agalactiae (Group B)     Comment:       This organism is considered to be universally susceptible to penicillin.  No further antibiotic testing will be performed. If Clindamycin or Erythromycin is the drug of choice, notify the laboratory within 7 days to request susceptibility testing.   08/02/2024 (A)  Final    Light growth (2+) Streptococcus agalactiae (Group B)     Comment:       This organism is considered to be universally susceptible to penicillin.  No further antibiotic testing will be performed. If Clindamycin or Erythromycin is the drug of choice, notify the  laboratory within 7 days to request susceptibility testing.       Radiology Data:   Imaging Results (Last 24 Hours)       ** No results found for the last 24 hours. **            I have reviewed the patient's current medications.     Assessment/Plan   Assessment  Active Hospital Problems    Diagnosis     **DKA, type 2, not at goal     E. coli UTI, ESBL     Atrial fibrillation     Chronic heart failure with preserved ejection fraction (HFpEF)     Chronic diastolic heart failure     GERD without esophagitis     Diabetic ulcer of left foot associated with type 2 diabetes mellitus        Treatment Plan  Continue current treatment/antibiotic  Monitor blood sugar.   Encourage compliance with diabetic regimen   Home in 1-2 days  Will need outpatient antibiotic recommendations from ID and social service will need to arrange.     Medical Decision Making  Number and Complexity of problems:     3 acute, high complexity problems  4+ chronic, moderate complexity problems     Differential Diagnosis: None     Conditions and Status        Condition is improving.     [facility] Data  External documents reviewed: Care Everywhere  Cardiac tracing (EKG, telemetry) interpretation: See HPI  Radiology interpretation: See HPI  Labs reviewed: See HPI  Any tests that were considered but not ordered: None     Decision rules/scores evaluated (example [id number], Wells, etc): None     Discussed with: The patient     Care Planning  Shared decision making: Patient  Code status and discussions: Full code    Disposition  Social Determinants of Health that impact treatment or disposition: none    I expect the patient to be discharged to home in 1-2 days.     Electronically signed by Alejandrina Barnett DO, 08/05/24, 12:00 CDT.

## 2024-08-05 NOTE — PROGRESS NOTES
"INFECTIOUS DISEASES PROGRESS NOTE    Patient:  Erick Luong  YOB: 1955  MRN: [id number]   Admit date: 7/31/2024   Admitting Physician: Alejandrina Barnett DO  Primary Care Physician: Del Shetty MD    Chief Complaint: None offered      Interval History: Patient offers no complaints.  He says he is wearing his offloading shoe when he is up to ambulate.      Denies rash or diarrhea    Allergies:   Allergies   Allergen Reactions    Cefepime Hives and Anaphylaxis    Bactrim [Sulfamethoxazole-Trimethoprim] Other (See Comments)     \"RENAL FAILURE\"    Vancomycin Itching    Zolpidem Mental Status Change     \"makes him crazy\"    Metronidazole Rash       Current Scheduled Medications:   ascorbic acid, 1,000 mg, Oral, Daily  donepezil, 10 mg, Oral, Daily  DULoxetine, 60 mg, Oral, Daily  enoxaparin, 1 mg/kg, Subcutaneous, Q12H  ertapenem, 1,000 mg, Intravenous, Q24H  famotidine, 20 mg, Oral, Q12H  ferric gluconate, 250 mg, Intravenous, Daily  insulin glargine, 10 Units, Subcutaneous, Daily  insulin regular, 3-14 Units, Subcutaneous, 4x Daily AC & at Bedtime  melatonin, 2.5 mg, Oral, Nightly  oxyCODONE, 10 mg, Oral, BID  pantoprazole, 40 mg, Oral, Q12H  polyethylene glycol, 17 g, Oral, Daily  pregabalin, 100 mg, Oral, Nightly  pregabalin, 50 mg, Oral, Daily  rosuvastatin, 10 mg, Oral, Nightly  senna-docusate sodium, 2 tablet, Oral, BID  sodium bicarbonate, 650 mg, Oral, TID  sodium chloride, 10 mL, Intravenous, Q12H  sodium chloride, 10 mL, Intravenous, Q12H  sodium hypochlorite, , Topical, Q12H      Current PRN Medications:    senna-docusate sodium **AND** polyethylene glycol **AND** bisacodyl **AND** bisacodyl    Calcium Replacement - Follow Nurse / BPA Driven Protocol    dextrose    dextrose    dextrose    glucagon (human recombinant)    Magnesium Standard Dose Replacement - Follow Nurse / BPA Driven Protocol    nitroglycerin    ondansetron    Pharmacy to Dose enoxaparin (LOVENOX)    Phosphorus " "Replacement - Follow Nurse / BPA Driven Protocol    Potassium Replacement - Follow Nurse / BPA Driven Protocol    sodium chloride    sodium chloride    sodium chloride    sodium chloride    Pharmacy to Dose enoxaparin (LOVENOX),   sodium chloride, 50 mL/hr, Last Rate: Stopped (24)           Objective     Vital Signs:  Temp (24hrs), Av.5 °F (36.4 °C), Min:97.3 °F (36.3 °C), Max:97.7 °F (36.5 °C)      /55 (BP Location: Right arm, Patient Position: Lying)   Pulse 69   Temp 97.7 °F (36.5 °C) (Oral)   Resp 18   Ht 182.9 cm (72\")   Wt 131 kg (289 lb)   SpO2 97%   BMI 39.20 kg/m²         Physical Exam:    General: Patient is an obese male lying in bed in no acute distress  Respiratory: Effort even and unlabored  Left lower extremity with dressing in place.  There are some drainage soaked through which appears serosanguineous in nature.  Psych: Pleasant cooperative      Results Review:    I reviewed the patient's new clinical results.    Lab Results:    CBC:   Lab Results   Lab 24  1849 24  0419 24  0558 24  0605 24  0615 24  0444   WBC 15.48* 12.83* 9.76 5.75 5.73 5.89   HEMOGLOBIN 9.8* 9.0* 10.0* 10.3* 9.5* 9.5*   HEMATOCRIT 29.6* 28.3* 31.8* 33.2* 30.5* 30.1*   PLATELETS 201 176 219 215 201 210        AutoDiff:   Lab Results   Lab 24  0605 24  0615 24  0444   NEUTROPHIL % 57.9 60.7 64.0   LYMPHOCYTE % 20.9 20.1 19.9   MONOCYTES % 8.7 9.6 9.2   EOSINOPHIL % 11.1* 7.9* 4.8   BASOPHIL % 0.9 0.7 0.7   NEUTROS ABS 3.33 3.48 3.78   LYMPHS ABS 1.20 1.15 1.17   MONOS ABS 0.50 0.55 0.54   EOS ABS 0.64* 0.45* 0.28   BASOS ABS 0.05 0.04 0.04        Manual Diff:    Lab Results   Lab 24  0605 24  0615 24  0444   NEUTROS ABS 3.33 3.48 3.78           CMP:   Lab Results   Lab 24  0605 24  0615 24  0444   SODIUM 143 145 144   POTASSIUM 3.8 3.9 3.9   CHLORIDE 110* 111* 112*   CO2 19.0* 21.0* 21.0*   BUN 31* 24* 20 "   CREATININE 1.82* 1.60* 1.47*   CALCIUM 8.3* 8.5* 8.3*   BILIRUBIN 0.3 0.3 0.3   ALK PHOS 88 95 102   ALT (SGPT) 7 9 9   AST (SGOT) 12 15 16   GLUCOSE 125* 97 89       Estimated Creatinine Clearance: 67.3 mL/min (A) (by C-G formula based on SCr of 1.47 mg/dL (H)).    Culture Results:    Microbiology Results (last 10 days)       Procedure Component Value - Date/Time    Wound Culture - Drainage, Foot, Left [[id number]]  (Abnormal) Collected: 08/02/24 1338    Lab Status: Final result Specimen: Drainage from Foot, Left Updated: 08/04/24 0857     Wound Culture Light growth (2+) Streptococcus agalactiae (Group B)     Comment:   This organism is considered to be universally susceptible to penicillin.  No further antibiotic testing will be performed. If Clindamycin or Erythromycin is the drug of choice, notify the laboratory within 7 days to request susceptibility testing.        Gram Stain Rare (1+) WBCs seen      No organisms seen    Wound Culture - Wound, Foot, Left [[id number]]  (Abnormal) Collected: 08/02/24 1032    Lab Status: Final result Specimen: Wound from Foot, Left Updated: 08/04/24 0857     Wound Culture Light growth (2+) Streptococcus agalactiae (Group B)     Comment:   This organism is considered to be universally susceptible to penicillin.  No further antibiotic testing will be performed. If Clindamycin or Erythromycin is the drug of choice, notify the laboratory within 7 days to request susceptibility testing.        Gram Stain Few (2+) WBCs seen      Rare (1+) Gram positive cocci    Eosinophil Smear - Urine, Urine, Clean Catch [[id number]]  (Normal) Collected: 08/01/24 1547    Lab Status: Final result Specimen: Urine, Clean Catch Updated: 08/02/24 0149     Eosinophil Smear 0 % EOS/100 Cells     MRSA Screen, PCR (Inpatient) - Swab, Nares [[id number]]  (Normal) Collected: 08/01/24 0206    Lab Status: Final result Specimen: Swab from Nares Updated: 08/01/24 0346     MRSA PCR No MRSA Detected    Narrative:       The negative predictive value of this diagnostic test is high and should only be used to consider de-escalating anti-MRSA therapy. A positive result may indicate colonization with MRSA and must be correlated clinically.    Urine Culture - Urine, Straight Cath [[id number]]  (Abnormal)  (Susceptibility) Collected: 07/31/24 1851    Lab Status: Final result Specimen: Urine from Straight Cath Updated: 08/04/24 1129     Urine Culture >100,000 CFU/mL Escherichia coli ESBL    Narrative:      Colonization of the urinary tract without infection is common. Treatment is discouraged unless the patient is symptomatic, pregnant, or undergoing an invasive urologic procedure.  Recent outcomes data supports the use of pip/tazo in the treatment of susceptible ESBL infections for uncomplicated UTI. Consider use of pip/tazo as a carbapenem-sparing regimen in applicable patients.    Susceptibility        Escherichia coli ESBL      MATT      Amikacin Susceptible      Ertapenem Susceptible      Gentamicin Resistant      Levofloxacin Susceptible      Meropenem Susceptible      Nitrofurantoin Susceptible      Piperacillin + Tazobactam Susceptible      Tobramycin Resistant      Trimethoprim + Sulfamethoxazole Resistant                           Blood Culture - Blood, Hand, Left [[id number]]  (Normal) Collected: 07/31/24 1849    Lab Status: Preliminary result Specimen: Blood from Hand, Left Updated: 08/04/24 1916     Blood Culture No growth at 4 days    Blood Culture - Blood, Arm, Left [[id number]]  (Abnormal) Collected: 07/31/24 1849    Lab Status: Final result Specimen: Blood from Arm, Left Updated: 08/02/24 0545     Blood Culture Staphylococcus, coagulase negative     Isolated from Aerobic Bottle     Gram Stain Aerobic Bottle Gram positive cocci in clusters    Narrative:      Probable contaminant requires clinical correlation, susceptibility not performed unless requested by physician.      Blood Culture ID, PCR - Blood, Arm, Left [[id number]]   (Abnormal) Collected: 07/31/24 0261    Lab Status: Final result Specimen: Blood from Arm, Left Updated: 08/01/24 1117     BCID, PCR Staph spp, not aureus or lugdunensis. Identification by BCID2 PCR.     BOTTLE TYPE Aerobic Bottle                 Radiology:   Imaging Results (Last 72 Hours)       ** No results found for the last 72 hours. **                Active Hospital Problems    Diagnosis     **DKA, type 2, not at goal     E. coli UTI, ESBL     Atrial fibrillation     Chronic heart failure with preserved ejection fraction (HFpEF)     Chronic diastolic heart failure     GERD without esophagitis     Diabetic ulcer of left foot associated with type 2 diabetes mellitus        IMPRESSION:  Diabetic foot infection-cultures on 2 separate occasions positive for group B streptococcus including debrided cultures obtained by SUSAN Hoffmann.  Urinary tract infection with ESBL E. coli-this was documented as a straight cath specimen.  Patient did complain of some frequency.  History of osteomyelitis with MRSA status posttreatment with vancomycin and transition to linezolid.    Uncontrolled diabetes mellitus with hemoglobin A1c greater than 12.    Chronic kidney disease stage IIIb with acute kidney injury.  Creatinine continues to improve.  Positive blood cultures for coagulase-negative staph-contaminant    RECOMMENDATION:   Continue ertapenem to cover both ESBL in urine as well as group B streptococcus-feel we can transition to oral when ready for discharge.  Dressing changes per podiatry.   Diabetes management per attending  Noted recommendations for skilled nursing facility for better wound care in this patient            Julia Adrian MD  08/05/24  11:19 CDT

## 2024-08-05 NOTE — CASE MANAGEMENT/SOCIAL WORK
Continued Stay Note   [place]     Patient Name: Erick Luong  MRN: [id number]  Today's Date: 8/5/2024    Admit Date: 7/31/2024    Plan: Home Health   Discharge Plan       Row Name 08/05/24 1022       Plan    Plan Home Health    Patient/Family in Agreement with Plan yes    Plan Comments Notation stating pt alert/oriented.  Pt saying he is not interested in SNF and will return home, refuses HH as well. He does agree to have a BSC set up for home use if ordered.  Spoke with Venessa Hinton from [phone] and she wants an update on where pt goes at d/c. Will follow.    Order that ID on board and will more than likely need IV abx.  Once ordered will find out if this is feasible. Does not seem like a good plan in home since no one to help with this at home and wife in LTAC here.  Will need much MD encouragement if he agrees to go somewhere for this. Will follow.                    Discharge Codes    No documentation.                       APOLINAR RenoW

## 2024-08-06 LAB
ANION GAP SERPL CALCULATED.3IONS-SCNC: 12 MMOL/L (ref 5–15)
BUN SERPL-MCNC: 18 MG/DL (ref 8–23)
BUN/CREAT SERPL: 12.2 (ref 7–25)
CALCIUM SPEC-SCNC: 8.4 MG/DL (ref 8.6–10.5)
CHLORIDE SERPL-SCNC: 109 MMOL/L (ref 98–107)
CO2 SERPL-SCNC: 22 MMOL/L (ref 22–29)
CREAT SERPL-MCNC: 1.47 MG/DL (ref 0.76–1.27)
EGFRCR SERPLBLD CKD-EPI 2021: 51.6 ML/MIN/1.73
GLUCOSE BLDC GLUCOMTR-MCNC: 103 MG/DL (ref 70–130)
GLUCOSE BLDC GLUCOMTR-MCNC: 108 MG/DL (ref 70–130)
GLUCOSE BLDC GLUCOMTR-MCNC: 116 MG/DL (ref 70–130)
GLUCOSE BLDC GLUCOMTR-MCNC: 133 MG/DL (ref 70–130)
GLUCOSE SERPL-MCNC: 101 MG/DL (ref 65–99)
POTASSIUM SERPL-SCNC: 4.1 MMOL/L (ref 3.5–5.2)
SODIUM SERPL-SCNC: 143 MMOL/L (ref 136–145)

## 2024-08-06 PROCEDURE — 25810000003 SODIUM CHLORIDE 0.9 % SOLUTION 250 ML FLEX CONT: Performed by: INTERNAL MEDICINE

## 2024-08-06 PROCEDURE — 63710000001 INSULIN GLARGINE PER 5 UNITS: Performed by: INTERNAL MEDICINE

## 2024-08-06 PROCEDURE — 25010000002 ENOXAPARIN PER 10 MG: Performed by: INTERNAL MEDICINE

## 2024-08-06 PROCEDURE — 82948 REAGENT STRIP/BLOOD GLUCOSE: CPT

## 2024-08-06 PROCEDURE — 99232 SBSQ HOSP IP/OBS MODERATE 35: CPT | Performed by: INTERNAL MEDICINE

## 2024-08-06 PROCEDURE — 25010000002 ERTAPENEM PER 500 MG: Performed by: INTERNAL MEDICINE

## 2024-08-06 PROCEDURE — 97116 GAIT TRAINING THERAPY: CPT

## 2024-08-06 PROCEDURE — 25010000002 NA FERRIC GLUC CPLX PER 12.5 MG: Performed by: INTERNAL MEDICINE

## 2024-08-06 PROCEDURE — 80048 BASIC METABOLIC PNL TOTAL CA: CPT | Performed by: FAMILY MEDICINE

## 2024-08-06 RX ORDER — OXYCODONE HYDROCHLORIDE 5 MG/1
10 TABLET ORAL 2 TIMES DAILY
Status: COMPLETED | OUTPATIENT
Start: 2024-08-06 | End: 2024-08-11

## 2024-08-06 RX ADMIN — OXYCODONE HYDROCHLORIDE 10 MG: 5 TABLET ORAL at 09:58

## 2024-08-06 RX ADMIN — POLYETHYLENE GLYCOL 3350 17 G: 17 POWDER, FOR SOLUTION ORAL at 09:59

## 2024-08-06 RX ADMIN — DOCUSATE SODIUM 50 MG AND SENNOSIDES 8.6 MG 2 TABLET: 8.6; 5 TABLET, FILM COATED ORAL at 20:50

## 2024-08-06 RX ADMIN — SODIUM HYPOCHLORITE: 1.25 SOLUTION TOPICAL at 10:00

## 2024-08-06 RX ADMIN — Medication 10 ML: at 20:50

## 2024-08-06 RX ADMIN — ROSUVASTATIN CALCIUM 10 MG: 10 TABLET, FILM COATED ORAL at 20:50

## 2024-08-06 RX ADMIN — OXYCODONE HYDROCHLORIDE AND ACETAMINOPHEN 1000 MG: 500 TABLET ORAL at 09:58

## 2024-08-06 RX ADMIN — ERTAPENEM 1000 MG: 1 INJECTION INTRAMUSCULAR; INTRAVENOUS at 15:16

## 2024-08-06 RX ADMIN — ENOXAPARIN SODIUM 135 MG: 150 INJECTION SUBCUTANEOUS at 20:50

## 2024-08-06 RX ADMIN — PANTOPRAZOLE SODIUM 40 MG: 40 TABLET, DELAYED RELEASE ORAL at 20:50

## 2024-08-06 RX ADMIN — OXYCODONE HYDROCHLORIDE 10 MG: 5 TABLET ORAL at 20:49

## 2024-08-06 RX ADMIN — SODIUM BICARBONATE 650 MG: 650 TABLET ORAL at 09:58

## 2024-08-06 RX ADMIN — FAMOTIDINE 20 MG: 20 TABLET ORAL at 20:50

## 2024-08-06 RX ADMIN — SODIUM BICARBONATE 650 MG: 650 TABLET ORAL at 15:16

## 2024-08-06 RX ADMIN — SODIUM BICARBONATE 650 MG: 650 TABLET ORAL at 20:50

## 2024-08-06 RX ADMIN — DOCUSATE SODIUM 50 MG AND SENNOSIDES 8.6 MG 2 TABLET: 8.6; 5 TABLET, FILM COATED ORAL at 09:58

## 2024-08-06 RX ADMIN — Medication 2.5 MG: at 20:50

## 2024-08-06 RX ADMIN — SODIUM CHLORIDE 250 MG: 9 INJECTION, SOLUTION INTRAVENOUS at 12:13

## 2024-08-06 RX ADMIN — INSULIN GLARGINE 10 UNITS: 100 INJECTION, SOLUTION SUBCUTANEOUS at 09:58

## 2024-08-06 RX ADMIN — PREGABALIN 100 MG: 100 CAPSULE ORAL at 20:50

## 2024-08-06 RX ADMIN — FAMOTIDINE 20 MG: 20 TABLET ORAL at 10:00

## 2024-08-06 RX ADMIN — DULOXETINE HYDROCHLORIDE 60 MG: 30 CAPSULE, DELAYED RELEASE ORAL at 09:58

## 2024-08-06 RX ADMIN — DONEPEZIL HYDROCHLORIDE 10 MG: 10 TABLET, FILM COATED ORAL at 09:58

## 2024-08-06 RX ADMIN — PREGABALIN 50 MG: 25 CAPSULE ORAL at 09:58

## 2024-08-06 RX ADMIN — PANTOPRAZOLE SODIUM 40 MG: 40 TABLET, DELAYED RELEASE ORAL at 09:58

## 2024-08-06 RX ADMIN — ENOXAPARIN SODIUM 135 MG: 150 INJECTION SUBCUTANEOUS at 09:58

## 2024-08-06 NOTE — PROGRESS NOTES
Nephrology ([facility]) Progress Note      Patient:  Erick Luong  YOB: 1955  Date of Service: 8/6/2024  MRN: [id number]   Acct: [id number]   Primary Care Physician: Del Shetty MD  Advance Directive:   Code Status and Medical Interventions: CPR (Attempt to Resuscitate); Full Support   Ordered at: 08/01/24 0053     Level Of Support Discussed With:    Patient     Code Status (Patient has no pulse and is not breathing):    CPR (Attempt to Resuscitate)     Medical Interventions (Patient has pulse or is breathing):    Full Support     Admit Date: 7/31/2024       Hospital Day: 6  Referring Provider: Abebe Garzon DO      Patient personally seen and examined.  Complete chart including Consults, Notes, Operative Reports, Labs, Cardiology, and Radiology studies reviewed as able.        Subjective:  Erick Luong is a 69 y.o. male for whom we were consulted for evaluation and treatment of acute kidney injury. Baseline chronic kidney disease stage 3b. Baseline creatinine approximately 1.5-1.8. Follows in our office.  History of poorly controlled type 2 diabetes, hypertension, coronary artery disease, diabetic foot ulcer, obesity.  On 7/31 patient was found wandering at home, confused. Brought to ER for evaluation. Has a nonhealing left foot diabetic ulcer with serosanguineous drainage. Blood glucose level was >700 with A1c >12%. Initial creatinine was 2.67 and has steadily improved since he was admitted. Nephrology consulted on 8/01. Hospital course remarkable for improving renal function and improved blood glucose levels. Moved to medical floor    Today is awake and alert. No new complaint. No new overnight issues. Urine output nonoliguric    Allergies:  Cefepime, Bactrim [sulfamethoxazole-trimethoprim], Vancomycin, Zolpidem, and Metronidazole    Home Meds:  Medications Prior to Admission   Medication Sig Dispense Refill Last Dose    ascorbic acid (VITAMIN C) 1000 MG  tablet Take 1 tablet by mouth Daily. 30 tablet 3     bumetanide (BUMEX) 1 MG tablet Take 1 tablet by mouth 2 (Two) Times a Day for 30 days. 60 tablet 0     busPIRone (BUSPAR) 10 MG tablet Take 1 tablet by mouth 2 (Two) Times a Day.       calcitriol (ROCALTROL) 0.5 MCG capsule Take 1 capsule by mouth Daily. 90 capsule 4     carvedilol (COREG) 3.125 MG tablet Take 1 tablet twice a day by oral route. 60 tablet 4     donepezil (ARICEPT) 10 MG tablet Take 1 tablet by mouth Daily. 90 tablet 1     DULoxetine (CYMBALTA) 60 MG capsule Take 1 capsule by mouth Daily. 90 capsule 4     famotidine (PEPCID) 20 MG tablet Take 1 tablet twice a day by oral route. 180 tablet 4     Insulin Glargine (Lantus SoloStar) 100 UNIT/ML injection pen Inject 20 Units under the skin into the appropriate area as directed every night at bedtime. 15 mL 3     Insulin Regular Human, Conc, (HumuLIN R) 500 UNIT/ML solution pen-injector CONCENTRATED injection Inject 40 Units under the skin into the appropriate area as directed 2 (Two) Times a Day Before Meals. Inject 40 units under the skin in the the appropriate area with regular meals AND 40 units with large meals.       Iron-Vitamin C (Vitron-C)  MG tablet Take 1 tablet twice a day by oral route. 60 tablet 2     levocetirizine (XYZAL) 5 MG tablet Take 1 tablet by mouth every day at bedtime. 30 tablet 2     melatonin 3 MG tablet Take 2 tablets by mouth At Night As Needed for Sleep. 30 tablet 0     oxyCODONE (ROXICODONE) 10 MG tablet Take 1 tablet by mouth 2 (Two) Times a Day As Needed. Must last 30 days per md. 55 tablet 0     pantoprazole (PROTONIX) 40 MG EC tablet Take 1 tablet by mouth Every 12 (Twelve) Hours. 180 tablet 4     pregabalin (LYRICA) 100 MG capsule Take 1 capsule by mouth Daily.       pregabalin (LYRICA) 100 MG capsule Take 2 capsules by mouth every night at bedtime.       rosuvastatin (CRESTOR) 10 MG tablet Take 1 tablet by mouth Every Night. 30 tablet 2     sucralfate (CARAFATE)  1 g tablet Take 1 tablet by mouth 4 (Four) Times a Day before meals. 360 tablet 1     Diclofenac Sodium (VOLTAREN) 1 % gel gel Apply 2 g topically to the appropriate area as directed 4 (Four) Times a Day As Needed. 300 g 11     nitroglycerin (NITROSTAT) 0.4 MG SL tablet Place 1 tablet under the tongue Every 5 (Five) Minutes As Needed for Chest Pain. Take no more than 3 doses in 15 minutes.       polyethylene glycol (MIRALAX) 17 GM/SCOOP powder Take 17 g by mouth Daily As Needed (constipation).       sennosides-docusate (PERICOLACE) 8.6-50 MG per tablet Take 1 tablet by mouth Every Night. Obtain OTC          Medicines:  Current Facility-Administered Medications   Medication Dose Route Frequency Provider Last Rate Last Admin    ascorbic acid (VITAMIN C) tablet 1,000 mg  1,000 mg Oral Daily Reuben Garza APRN   1,000 mg at 08/06/24 0958    sennosides-docusate (PERICOLACE) 8.6-50 MG per tablet 2 tablet  2 tablet Oral BID Lamine Figueroa APRN   2 tablet at 08/06/24 0958    And    polyethylene glycol (MIRALAX) packet 17 g  17 g Oral Daily PRN Lamine Figueroa APRN        And    bisacodyl (DULCOLAX) EC tablet 5 mg  5 mg Oral Daily PRN Lamine Figueroa APRN        And    bisacodyl (DULCOLAX) suppository 10 mg  10 mg Rectal Daily PRN Lamine Figueroa APRN   10 mg at 08/02/24 0941    Calcium Replacement - Follow Nurse / BPA Driven Protocol   Does not apply PRN Abebe Garzon DO        dextrose (D50W) (25 g/50 mL) IV injection 10-50 mL  10-50 mL Intravenous Q15 Min PRN Abebe Garzon DO        dextrose (D50W) (25 g/50 mL) IV injection 25 g  25 g Intravenous Q15 Min PRN Lamine Figueroa APRN        dextrose (GLUTOSE) oral gel 15 g  15 g Oral Q15 Min PRN Lamine Figueroa APRN        donepezil (ARICEPT) tablet 10 mg  10 mg Oral Daily Reuben Garza APRN   10 mg at 08/06/24 0958    DULoxetine (CYMBALTA) DR capsule 60 mg  60 mg Oral Daily Reuben Garza APRN   60 mg at 08/06/24 0958    Enoxaparin Sodium  (LOVENOX) syringe 135 mg  1 mg/kg Subcutaneous Q12H Abebe Garzon DO   135 mg at 08/06/24 0958    ertapenem (INVanz) 1,000 mg in sodium chloride 0.9 % 100 mL MBP  1,000 mg Intravenous Q24H Julia Adrian  mL/hr at 08/05/24 1419 1,000 mg at 08/05/24 1419    famotidine (PEPCID) tablet 20 mg  20 mg Oral Q12H Reuben Garza APRN   20 mg at 08/06/24 1000    ferric gluconate (FERRLECIT) 250 mg in sodium chloride 0.9 % 250 mL IVPB  250 mg Intravenous Daily Brian Lomas  mL/hr at 08/05/24 0833 250 mg at 08/05/24 0833    glucagon (GLUCAGEN) injection 1 mg  1 mg Intramuscular Q15 Min PRN Lamine Figueroa APRN        insulin glargine (LANTUS, SEMGLEE) injection 10 Units  10 Units Subcutaneous Daily Keren Jamison MD   10 Units at 08/06/24 0958    insulin regular (humuLIN R,novoLIN R) injection 3-14 Units  3-14 Units Subcutaneous 4x Daily AC & at Bedtime Keren Jamison MD   3 Units at 08/03/24 2033    Magnesium Standard Dose Replacement - Follow Nurse / BPA Driven Protocol   Does not apply PRN Abebe Garzon DO        melatonin tablet 2.5 mg  2.5 mg Oral Nightly Reuben Garza APRN   2.5 mg at 08/05/24 2226    nitroglycerin (NITROSTAT) SL tablet 0.4 mg  0.4 mg Sublingual Q5 Min PRN Lamine Figueroa APRN        ondansetron (ZOFRAN) injection 4 mg  4 mg Intravenous Q6H PRN Payam Keyes DO   4 mg at 08/05/24 0800    pantoprazole (PROTONIX) EC tablet 40 mg  40 mg Oral Q12H Reuben Garza APRN   40 mg at 08/06/24 0958    Pharmacy to Dose enoxaparin (LOVENOX)   Does not apply Continuous PRN Abebe Garzon DO        Phosphorus Replacement - Follow Nurse / BPA Driven Protocol   Does not apply PRN Abebe Garzon DO        polyethylene glycol (MIRALAX) packet 17 g  17 g Oral Daily Reuben Garza APRN   17 g at 08/06/24 0959    Potassium Replacement - Follow Nurse / BPA Driven Protocol   Does not apply PRN Abebe Garzon DO        pregabalin (LYRICA) capsule 100 mg   100 mg Oral Nightly Reuben Garza APRN   100 mg at 08/05/24 2226    pregabalin (LYRICA) capsule 50 mg  50 mg Oral Daily Reuben Garza APRN   50 mg at 08/06/24 0958    rosuvastatin (CRESTOR) tablet 10 mg  10 mg Oral Nightly Reuben Garza APRN   10 mg at 08/05/24 2232    sodium bicarbonate tablet 650 mg  650 mg Oral TID Brian Lomas MD   650 mg at 08/06/24 0958    sodium chloride 0.9 % flush 10 mL  10 mL Intravenous Q12H Abebe Garzon,    10 mL at 08/05/24 2233    sodium chloride 0.9 % flush 10 mL  10 mL Intravenous PRN Abebe Garzon,         sodium chloride 0.9 % flush 10 mL  10 mL Intravenous Q12H Lamine Figueroa APRN   10 mL at 08/05/24 2232    sodium chloride 0.9 % flush 10 mL  10 mL Intravenous PRN Lamine Figueroa APRN        sodium chloride 0.9 % infusion 40 mL  40 mL Intravenous PRN Abebe Garzon,         sodium chloride 0.9 % infusion 40 mL  40 mL Intravenous PRN Lamine Figueroa APRN        sodium chloride 0.9 % infusion  50 mL/hr Intravenous Continuous Brian Lomas MD   Stopped at 08/04/24 2235    sodium hypochlorite (DAKIN'S 1/4 STRENGTH) 0.125 % topical solution 0.125% solution   Topical Q12H Aby High APRN   Given at 08/06/24 1000       Past Medical History:  Past Medical History:   Diagnosis Date    Arthritis     Autonomic disease     CAD (coronary artery disease) 02/06/2017    Cervical radiculopathy 09/16/2021    Chronic constipation with acute exaccerbation 05/10/2021    Coronary artery disease     Degeneration of cervical intervertebral disc 08/11/2021    Diabetes mellitus     Diabetic foot ulcer 08/31/2020    Diabetic polyneuropathy associated with type 2 diabetes mellitus 01/18/2021    Elevated cholesterol     Gastroesophageal reflux disease 05/13/2019    Headache     HTN (hypertension), benign 05/03/2017    Hyperlipidemia     Hypertension     Mixed hyperlipidemia 02/07/2017    Multiple lung nodules 01/26/2020    5mm, 9 mm RLL identified 1/2020, not  present 10/2019.    Myocardial infarction     Osteomyelitis 01/22/2020    Osteomyelitis of fifth toe of right foot 10/07/2019    Pancreatitis     Persistent insomnia 01/20/2020    Renal disorder     Sleep apnea 02/06/2017    Sleep apnea with use of continuous positive airway pressure (CPAP)     NON-COMPLIANT    Slow transit constipation 01/16/2019    Spinal stenosis in cervical region 09/16/2021    Vitamin D deficiency 03/02/2021       Past Surgical History:  Past Surgical History:   Procedure Laterality Date    ABDOMINAL SURGERY      AMPUTATION FOOT / TOE Left 10/2021    5th digit     ANTERIOR CERVICAL DISCECTOMY W/ FUSION N/A 08/05/2022    Procedure: CERVICAL DISCECTOMY ANTERIOR WITH FUSION C5-6 with possible plating of C5-7 with neuromonitoring and 1 c-arm;  Surgeon: Karel Soliz MD;  Location: [facility] OR;  Service: Neurosurgery;  Laterality: N/A;    APPENDECTOMY      BACK SURGERY      CARDIAC CATHETERIZATION Left 02/08/2021    Procedure: Left Heart Cath w poss intervention left anatomical snuff box acess;  Surgeon: Omkar Charles DO;  Location:  [facility];  Service: Cardiology;  Laterality: Left;    CARDIAC SURGERY      CATARACT EXTRACTION      CERVICAL SPINE SURGERY      COLONOSCOPY N/A 01/31/2017    Normal exam repeat in 5 years    COLONOSCOPY N/A 02/11/2019    Mild acute inflammation    COLONOSCOPY N/A 04/07/2024    2 areas at 10 and 20 cm with friability ulceration 2 clips placed at 20 cm and 4 clips at 10 cm poor prep normal mucosa,mild eroisions and ulcerations in visible vessels    COLONOSCOPY N/A 7/1/2024    Procedure: COLONOSCOPY WITH ANESTHESIA;  Surgeon: Arsalan Lorenzo DO;  Location:  [facility];  Service: Gastroenterology;  Laterality: N/A;  pre op constipation/diarrhea  post poor prep  pcp Del Shetty    COLONOSCOPY N/A 7/2/2024    Procedure: COLONOSCOPY WITH ANESTHESIA;  Surgeon: Agapito Christopher MD;  Location:  [facility];  Service:  Gastroenterology;  Laterality: N/A;  pre rectal bleeding  post poor prep  pcp Del Shetty MD    COLONOSCOPY W/ POLYPECTOMY  2014    Hyperplastic polyp    CORONARY ARTERY BYPASS GRAFT  10/2015    ENDOSCOPY  2011    Gastritis with hemorrhage    ENDOSCOPY N/A 2017    Normal exam    ENDOSCOPY N/A 2019    Gastritis    ENDOSCOPY N/A 2020    Non-erosive gastritis with hemorrhage    ENDOSCOPY N/A 02/10/2021    Esophagitis    ENDOSCOPY N/A 2024    There were esophageal mucosal changes suspicious for short-segment Low's esophagus present in the distal esophagus. The maximum longitudinal extent of these mucosal changes was 2 cm in length. Mucosa was biopsied with a cold forceps for histologyDistal esophagus, biopsies: Mild chronic active esophagogastritis. No evidence of intestinal metaplasia, dysplasia. Antrum, bx, Mild chronic gastritis    FOOT SURGERY Left     INCISION AND DRAINAGE OF WOUND Left 2007    spider bite       Family History  Family History   Problem Relation Age of Onset    Colon cancer Father     Heart disease Father     Colon cancer Sister     Colon polyps Sister     Alzheimer's disease Mother     Coronary artery disease Sister     Coronary artery disease Sister        Social History  Social History     Socioeconomic History    Marital status:    Tobacco Use    Smoking status: Former     Current packs/day: 0.00     Types: Cigarettes     Quit date:      Years since quittin.6    Smokeless tobacco: Never    Tobacco comments:     smoked in [employer]   Vaping Use    Vaping status: Never Used   Substance and Sexual Activity    Alcohol use: No    Drug use: No    Sexual activity: Defer       Review of Systems:  History obtained from chart review and the patient  General ROS: No fever or chills  Respiratory ROS: No cough, shortness of breath, wheezing  Cardiovascular ROS: No chest pain or palpitations  Gastrointestinal ROS: No abdominal pain or  melena  Genito-Urinary ROS: No dysuria or hematuria  Psych ROS: No anxiety and depression  14 point ROS reviewed with the patient and negative except as noted above and in the HPI unless unable to obtain.    Objective:  Patient Vitals for the past 24 hrs:   BP Temp Temp src Pulse Resp SpO2 Weight   08/06/24 0802 170/86 97.5 °F (36.4 °C) Oral 73 18 96 % --   08/06/24 0630 -- -- -- -- -- -- 132 kg (291 lb 6.4 oz)   08/06/24 0408 134/59 97.5 °F (36.4 °C) Oral 73 18 94 % --   08/05/24 2350 125/52 98.3 °F (36.8 °C) Oral 73 18 95 % --   08/05/24 2005 151/65 97.8 °F (36.6 °C) Oral 77 16 94 % --   08/05/24 1617 152/82 97.5 °F (36.4 °C) Oral 72 16 98 % --   08/05/24 1159 155/76 97.4 °F (36.3 °C) Oral 69 16 97 % --       Intake/Output Summary (Last 24 hours) at 8/6/2024 1007  Last data filed at 8/6/2024 0408  Gross per 24 hour   Intake 240 ml   Output 700 ml   Net -460 ml     General: awake/alert   Chest:  clear to auscultation bilaterally without respiratory distress  CVS: regular rate and rhythm  Abdominal: soft, nontender, positive bowel sounds  Extremities: no cyanosis or edema  Skin:  left foot ulcer and warm and dry without rash      Labs:  Results from last 7 days   Lab Units 08/05/24  0444 08/04/24  0615 08/03/24  0605   WBC 10*3/mm3 5.89 5.73 5.75   HEMOGLOBIN g/dL 9.5* 9.5* 10.3*   HEMATOCRIT % 30.1* 30.5* 33.2*   PLATELETS 10*3/mm3 210 201 215         Results from last 7 days   Lab Units 08/06/24  0508 08/05/24  0444 08/04/24  0615 08/03/24  0605   SODIUM mmol/L 143 144 145 143   POTASSIUM mmol/L 4.1 3.9 3.9 3.8   CHLORIDE mmol/L 109* 112* 111* 110*   CO2 mmol/L 22.0 21.0* 21.0* 19.0*   BUN mg/dL 18 20 24* 31*   CREATININE mg/dL 1.47* 1.47* 1.60* 1.82*   CALCIUM mg/dL 8.4* 8.3* 8.5* 8.3*   EGFR mL/min/1.73 51.6* 51.6* 46.6* 40.0*   BILIRUBIN mg/dL  --  0.3 0.3 0.3   ALK PHOS U/L  --  102 95 88   ALT (SGPT) U/L  --  9 9 7   AST (SGOT) U/L  --  16 15 12   GLUCOSE mg/dL 101* 89 97 125*       Radiology:   Imaging  Results (Last 72 Hours)       ** No results found for the last 72 hours. **            Culture:  Blood Culture   Date Value Ref Range Status   07/31/2024 No growth at 24 hours  Preliminary   07/31/2024 Staphylococcus, coagulase negative (C)  Final         Assessment    Acute kidney injury, ATN--resolving  Baseline chronic kidney disease stage 3b  Type 2 diabetes  Hypertension  Sepsis related to diabetic foot ulcer  Anemia of CKD  Obesity     Plan:   Encourage PO fluids  Renal function stable at baseline level  Monitor labs      Stewart Alvarez, SUSAN  8/6/2024  10:07 CDT

## 2024-08-06 NOTE — PROGRESS NOTES
[facility] Medicine Services  INPATIENT PROGRESS NOTE    Patient Name: Erick Luong  Date of Admission: 7/31/2024  Today's Date: 08/06/24  Length of Stay: 6  Primary Care Physician: Del Shetty MD    Subjective   Chief Complaint: Continues to complain of weakness.     HPI   Patient notes he had not walked today at time of examination.  Continues on IV antibiotic.   Patient acknowlegdes that he can't take care of himself.  Wife is in hospital .    Discussed that it would be in his best interest to go to LTACH for continued antibiotic and therapy if he would qualify.  He verbalized agreement for me to make order for LTACH referral.     Patient denied pain at time of exam.        Review of Systems   All pertinent negatives and positives are as above. All other systems have been reviewed and are negative unless otherwise stated.     Objective    Temp:  [97.4 °F (36.3 °C)-98.3 °F (36.8 °C)] 97.5 °F (36.4 °C)  Heart Rate:  [69-77] 73  Resp:  [16-18] 18  BP: (125-170)/(52-86) 170/86  Physical Exam  Vitals and nursing note reviewed.   Constitutional:       Appearance: He is obese.   HENT:      Head: Normocephalic and atraumatic.      Right Ear: External ear normal.      Left Ear: External ear normal.      Nose: Nose normal.      Mouth/Throat:      Mouth: Mucous membranes are moist.   Eyes:      Extraocular Movements: Extraocular movements intact.      Conjunctiva/sclera: Conjunctivae normal.      Pupils: Pupils are equal, round, and reactive to light.   Cardiovascular:      Rate and Rhythm: Normal rate and regular rhythm.      Pulses: Normal pulses.      Heart sounds: No murmur heard.     No friction rub. No gallop.   Pulmonary:      Effort: Pulmonary effort is normal.      Breath sounds: Normal breath sounds.   Abdominal:      General: Bowel sounds are normal.      Palpations: Abdomen is soft.   Musculoskeletal:      Cervical back: Normal range of motion and neck supple.       Comments: Moves all extremities.  Dressing to left heel ulcer intact.   Skin:     General: Skin is warm and dry.      Capillary Refill: Capillary refill takes less than 2 seconds.   Neurological:      General: No focal deficit present.      Mental Status: He is alert and oriented to person, place, and time.      Cranial Nerves: No cranial nerve deficit.   Psychiatric:         Behavior: Behavior normal.      Comments: Mood is apathetic             Results Review:  I have reviewed the labs, radiology results, and diagnostic studies.    Laboratory Data:   Results from last 7 days   Lab Units 08/05/24  0444 08/04/24  0615 08/03/24  0605   WBC 10*3/mm3 5.89 5.73 5.75   HEMOGLOBIN g/dL 9.5* 9.5* 10.3*   HEMATOCRIT % 30.1* 30.5* 33.2*   PLATELETS 10*3/mm3 210 201 215        Results from last 7 days   Lab Units 08/06/24  0508 08/05/24 0444 08/04/24  0615 08/03/24  0605   SODIUM mmol/L 143 144 145 143   POTASSIUM mmol/L 4.1 3.9 3.9 3.8   CHLORIDE mmol/L 109* 112* 111* 110*   CO2 mmol/L 22.0 21.0* 21.0* 19.0*   BUN mg/dL 18 20 24* 31*   CREATININE mg/dL 1.47* 1.47* 1.60* 1.82*   CALCIUM mg/dL 8.4* 8.3* 8.5* 8.3*   BILIRUBIN mg/dL  --  0.3 0.3 0.3   ALK PHOS U/L  --  102 95 88   ALT (SGPT) U/L  --  9 9 7   AST (SGOT) U/L  --  16 15 12   GLUCOSE mg/dL 101* 89 97 125*       Culture Data:   Blood Culture   Date Value Ref Range Status   07/31/2024 No growth at 5 days  Final   07/31/2024 Staphylococcus, coagulase negative (C)  Final     Urine Culture   Date Value Ref Range Status   07/31/2024 >100,000 CFU/mL Escherichia coli ESBL (A)  Final     Wound Culture   Date Value Ref Range Status   08/02/2024 (A)  Final    Light growth (2+) Streptococcus agalactiae (Group B)     Comment:       This organism is considered to be universally susceptible to penicillin.  No further antibiotic testing will be performed. If Clindamycin or Erythromycin is the drug of choice, notify the laboratory within 7 days to request susceptibility testing.    08/02/2024 (A)  Final    Light growth (2+) Streptococcus agalactiae (Group B)     Comment:       This organism is considered to be universally susceptible to penicillin.  No further antibiotic testing will be performed. If Clindamycin or Erythromycin is the drug of choice, notify the laboratory within 7 days to request susceptibility testing.       Radiology Data:   Imaging Results (Last 24 Hours)       ** No results found for the last 24 hours. **            I have reviewed the patient's current medications.     Assessment/Plan   Assessment  Active Hospital Problems    Diagnosis     **DKA, type 2, not at goal     E. coli UTI, ESBL     Atrial fibrillation     Chronic heart failure with preserved ejection fraction (HFpEF)     Chronic diastolic heart failure     GERD without esophagitis     Diabetic ulcer of left foot associated with type 2 diabetes mellitus        Treatment Plan  LTACH referral  Continue antibiotics  Encourage out of bed/ambulate.   Continue to monitor glucose.     Medical Decision Making  Number and Complexity of problems:   3 acute, high complexity problems  4+ chronic, moderate complexity problems     Differential Diagnosis: None     Conditions and Status        Condition is improving.     MDM Data  External documents reviewed: Care Everywhere  Cardiac tracing (EKG, telemetry) interpretation: See HPI  Radiology interpretation: See HPI  Labs reviewed: See HPI  Any tests that were considered but not ordered: None     Decision rules/scores evaluated (example [id number], Wells, etc): None     Discussed with: The patient     Care Planning  Shared decision making: Patient  Code status and discussions: Full code     Disposition  Social Determinants of Health that impact treatment or disposition: none     I expect the patient to be discharged to ?LTACH  in 1-2 days. .     Electronically signed by Alejandrina Barnett DO, 08/06/24, 11:40 CDT.

## 2024-08-06 NOTE — PLAN OF CARE
Goal Outcome Evaluation:  Plan of Care Reviewed With: patient        Progress: improving  Outcome Evaluation: PT tx completed. Pt in bed and agrees to therapy. S for bed mobility. Required assist for donning of L surgical shoe. No c/o pain. Pt stands with CGA. Amb 50' at a time with RWX and CGA. As pt fatigeus he requires more cues for safe gait mechanics. Noted L decreased foot clearance at times. Will cont to follow.

## 2024-08-06 NOTE — THERAPY TREATMENT NOTE
Acute Care - Physical Therapy Treatment Note  [facility]     Patient Name: Erick Luong  : 1955  MRN: [id number]  Today's Date: 2024      Visit Dx:     ICD-10-CM ICD-9-CM   1. Diabetic foot infection  E11.628 250.80    L08.9 686.9   2. Osteomyelitis of foot, unspecified laterality, unspecified type  M86.9 730.27   3. Diabetic ketoacidosis without coma associated with type 2 diabetes mellitus  E11.10 250.12   4. Elevated troponin  R79.89 790.6   5. Atrial fibrillation with RVR  I48.91 427.31   6. Confusion  R41.0 298.9   7. Impaired mobility [Z74.09]  Z74.09 799.89     Patient Active Problem List   Diagnosis    Obesity, unspecified obesity severity, unspecified obesity type    Essential hypertension    Type 2 diabetes mellitus with hyperglycemia, with long-term current use of insulin    Nonsmoker    Anemia due to chronic kidney disease    Class 3 severe obesity due to excess calories with body mass index (BMI) of 40.0 to 44.9 in adult    Anasarca    Sleep apnea with use of continuous positive airway pressure (CPAP)    Medically noncompliant    Diabetic ulcer of left foot associated with type 2 diabetes mellitus    Diabetic polyneuropathy associated with type 2 diabetes mellitus    Spinal stenosis in cervical region    Degeneration of cervical intervertebral disc    Cervical radiculopathy    Degeneration of lumbar or lumbosacral intervertebral disc    Cervical myelopathy    Bilateral carpal tunnel syndrome    CAD (coronary artery disease)    GERD without esophagitis    BPH without obstruction/lower urinary tract symptoms    Stage 3b chronic kidney disease    Chronic diastolic heart failure    Type 2 myocardial infarction due to heart failure    Left carpal tunnel syndrome    Syncope and collapse, recurrent episodes    Poorly-controlled hypertension    Rhinovirus    Peripheral vascular disease    Chronic kidney disease (CKD), stage IV (severe)    Diabetic foot infection    Sepsis    Epigastric pain     Chronic heart failure with preserved ejection fraction (HFpEF)    Sepsis due to methicillin resistant Staphylococcus aureus (MRSA) with encephalopathy without septic shock    Slow transit constipation    CHF exacerbation    CHF (congestive heart failure), NYHA class I, acute on chronic, combined    Rectal bleeding    Acute on chronic heart failure with preserved ejection fraction (HFpEF)    DKA, type 2, not at goal    Atrial fibrillation    E. coli UTI, ESBL     Past Medical History:   Diagnosis Date    Arthritis     Autonomic disease     CAD (coronary artery disease) 02/06/2017    Cervical radiculopathy 09/16/2021    Chronic constipation with acute exaccerbation 05/10/2021    Coronary artery disease     Degeneration of cervical intervertebral disc 08/11/2021    Diabetes mellitus     Diabetic foot ulcer 08/31/2020    Diabetic polyneuropathy associated with type 2 diabetes mellitus 01/18/2021    Elevated cholesterol     Gastroesophageal reflux disease 05/13/2019    Headache     HTN (hypertension), benign 05/03/2017    Hyperlipidemia     Hypertension     Mixed hyperlipidemia 02/07/2017    Multiple lung nodules 01/26/2020    5mm, 9 mm RLL identified 1/2020, not present 10/2019.    Myocardial infarction     Osteomyelitis 01/22/2020    Osteomyelitis of fifth toe of right foot 10/07/2019    Pancreatitis     Persistent insomnia 01/20/2020    Renal disorder     Sleep apnea 02/06/2017    Sleep apnea with use of continuous positive airway pressure (CPAP)     NON-COMPLIANT    Slow transit constipation 01/16/2019    Spinal stenosis in cervical region 09/16/2021    Vitamin D deficiency 03/02/2021     Past Surgical History:   Procedure Laterality Date    ABDOMINAL SURGERY      AMPUTATION FOOT / TOE Left 10/2021    5th digit     ANTERIOR CERVICAL DISCECTOMY W/ FUSION N/A 08/05/2022    Procedure: CERVICAL DISCECTOMY ANTERIOR WITH FUSION C5-6 with possible plating of C5-7 with neuromonitoring and 1 c-arm;  Surgeon: Karel Soliz,  MD;  Location: [facility] OR;  Service: Neurosurgery;  Laterality: N/A;    APPENDECTOMY      BACK SURGERY      CARDIAC CATHETERIZATION Left 02/08/2021    Procedure: Left Heart Cath w poss intervention left anatomical snuff box acess;  Surgeon: Omkar Charles DO;  Location: [facility] CATH INVASIVE LOCATION;  Service: Cardiology;  Laterality: Left;    CARDIAC SURGERY      CATARACT EXTRACTION      CERVICAL SPINE SURGERY      COLONOSCOPY N/A 01/31/2017    Normal exam repeat in 5 years    COLONOSCOPY N/A 02/11/2019    Mild acute inflammation    COLONOSCOPY N/A 04/07/2024    2 areas at 10 and 20 cm with friability ulceration 2 clips placed at 20 cm and 4 clips at 10 cm poor prep normal mucosa,mild eroisions and ulcerations in visible vessels    COLONOSCOPY N/A 7/1/2024    Procedure: COLONOSCOPY WITH ANESTHESIA;  Surgeon: Arsalan Lorenzo DO;  Location: [facility] ENDOSCOPY;  Service: Gastroenterology;  Laterality: N/A;  pre op constipation/diarrhea  post poor prep  pcp Del Shetty    COLONOSCOPY N/A 7/2/2024    Procedure: COLONOSCOPY WITH ANESTHESIA;  Surgeon: Agapito Christopher MD;  Location: [facility] ENDOSCOPY;  Service: Gastroenterology;  Laterality: N/A;  pre rectal bleeding  post poor prep  pcp Del Shetty MD    COLONOSCOPY W/ POLYPECTOMY  03/04/2014    Hyperplastic polyp    CORONARY ARTERY BYPASS GRAFT  10/2015    ENDOSCOPY  04/13/2011    Gastritis with hemorrhage    ENDOSCOPY N/A 05/05/2017    Normal exam    ENDOSCOPY N/A 02/11/2019    Gastritis    ENDOSCOPY N/A 09/01/2020    Non-erosive gastritis with hemorrhage    ENDOSCOPY N/A 02/10/2021    Esophagitis    ENDOSCOPY N/A 04/11/2024    There were esophageal mucosal changes suspicious for short-segment Low's esophagus present in the distal esophagus. The maximum longitudinal extent of these mucosal changes was 2 cm in length. Mucosa was biopsied with a cold forceps for histologyDistal esophagus, biopsies: Mild chronic active esophagogastritis. No  evidence of intestinal metaplasia, dysplasia. Antrum, bx, Mild chronic gastritis    FOOT SURGERY Left     INCISION AND DRAINAGE OF WOUND Left 09/2007    spider bite     PT Assessment (Last 12 Hours)       PT Evaluation and Treatment       Row Name 08/06/24 1046          Physical Therapy Time and Intention    Subjective Information complains of;fatigue  -LY     Document Type therapy note (daily note)  -LY     Mode of Treatment physical therapy  -LY       Row Name 08/06/24 1046          General Information    Existing Precautions/Restrictions fall;other (see comments)  wound on L heel, surgical shoe  -LY       Row Name 08/06/24 1046          Pain    Pretreatment Pain Rating 0/10 - no pain  -LY     Posttreatment Pain Rating 0/10 - no pain  -LY       Row Name 08/06/24 1046          Bed Mobility    Supine-Sit Leonardo (Bed Mobility) supervision  -LY     Comment, (Bed Mobility) left sitting EOB  -LY       Row Name 08/06/24 1046          Sit-Stand Transfer    Sit-Stand Leonardo (Transfers) verbal cues;contact guard  -LY       Row Name 08/06/24 1046          Stand-Sit Transfer    Stand-Sit Leonardo (Transfers) verbal cues;contact guard  -LY       Row Name 08/06/24 1046          Gait/Stairs (Locomotion)    Leonardo Level (Gait) verbal cues;contact guard  -LY     Assistive Device (Gait) walker, front-wheeled  -LY     Distance in Feet (Gait) 50  x2  -LY     Deviations/Abnormal Patterns (Gait) gait speed decreased;stride length decreased  -LY     Bilateral Gait Deviations forward flexed posture;heel strike decreased  -LY     Comment, (Gait/Stairs) cues for gait mechanics, when fatigued decreased L foot clearance noted  -LY       Row Name             Wound 08/22/23 0140 Left posterior plantar    Wound - Properties Group Placement Date: 08/22/23  -AM Placement Time: 0140  -AM Present on Original Admission: Y  -AM Side: Left  -AM Orientation: posterior  -AM Location: plantar  -AM    Retired Wound - Properties Group  Placement Date: 08/22/23  -AM Placement Time: 0140  -AM Present on Original Admission: Y  -AM Side: Left  -AM Orientation: posterior  -AM Location: plantar  -AM    Retired Wound - Properties Group Date first assessed: 08/22/23  -AM Time first assessed: 0140  -AM Present on Original Admission: Y  -AM Side: Left  -AM Location: plantar  -AM      Row Name             Wound Left lateral foot Diabetic Ulcer    Wound - Properties Group Side: Left  -MH Orientation: lateral  -MH Location: foot  -JACIEL, 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;diabetic foot ulcer/gangrene  Primary Wound Type: Diabetic ulc  -JACIEL Wound Outcome: Amputation  -JACIEL Stage, Pressure Injury : other (see comments)  -JACIEL, full thickness starting 10/20/21     Retired Wound - Properties Group Side: Left  -MH Orientation: lateral  -MH Location: foot  -JACIEL, 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;diabetic foot ulcer/gangrene  Primary Wound Type: Diabetic ulc  -JACIEL Stage, Pressure Injury : other (see comments)  -JACIEL, full thickness starting 10/20/21  Wound Outcome: Amputation  -JACIEL    Retired Wound - Properties Group Side: Left  -MH Location: foot  -JACIEL, 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;diabetic foot ulcer/gangrene  Primary Wound Type: Diabetic ulc  -JACIEL Wound Outcome: Amputation  -JACIEL      Row Name             Wound 06/15/23 0102 Left anterior plantar    Wound - Properties Group Placement Date: 06/15/23  -SM Placement Time: 0102 -SM Side: Left  -SM Orientation: anterior  -SM Location: plantar  -SM    Retired Wound - Properties Group Placement Date: 06/15/23  -SM Placement Time: 0102  -SM Side: Left  -SM Orientation: anterior  -SM Location: plantar  -SM    Retired Wound - Properties Group Date first assessed: 06/15/23  -SM Time first assessed: 0102  -SM Side: Left  -SM Location: plantar  -SM      Row Name             Wound 04/04/24 2100 Left posterior wrist Skin Tear    Wound - Properties Group Placement Date: 04/04/24  -NR Placement Time: 2100  -NR Present on Original Admission: N  -NR Side: Left  -NR  Orientation: posterior  -NR Location: wrist  -NR Primary Wound Type: Skin tear  -NR Additional Comments: likely caused by wristband. Band removed, site cleaned with alcohol swab, wraped in gauze  -NR    Retired Wound - Properties Group Placement Date: 04/04/24  -NR Placement Time: 2100 -NR Present on Original Admission: N  -NR Side: Left  -NR Orientation: posterior  -NR Location: wrist  -NR Primary Wound Type: Skin tear  -NR Additional Comments: likely caused by wristband. Band removed, site cleaned with alcohol swab, wraped in gauze  -NR    Retired Wound - Properties Group Date first assessed: 04/04/24  -NR Time first assessed: 2100  -NR Present on Original Admission: N  -NR Side: Left  -NR Location: wrist  -NR Primary Wound Type: Skin tear  -NR Additional Comments: likely caused by wristband. Band removed, site cleaned with alcohol swab, wraped in gauze  -NR      Row Name 08/06/24 1046          Plan of Care Review    Plan of Care Reviewed With patient  -LY     Progress improving  -LY     Outcome Evaluation PT tx completed. Pt in bed and agrees to therapy. S for bed mobility. Required assist for donning of L surgical shoe. No c/o pain. Pt stands with CGA. Amb 50' at a time with RWX and CGA. As pt fatigeus he requires more cues for safe gait mechanics. Noted L decreased foot clearance at times. Will cont to follow.  -LY       Row Name 08/06/24 1046          Positioning and Restraints    Pre-Treatment Position in bed  -LY     Post Treatment Position bed  -LY     In Bed sitting EOB;call light within reach;encouraged to call for assist  -LY               User Key  (r) = Recorded By, (t) = Taken By, (c) = Cosigned By      Initials Name Provider Type    Yuliana Lisa RN Registered Nurse    MH Haase, Mallory L, RN Registered Nurse    Alesha Armenta, PTA Physical Therapist Assistant    Faviola Kunz RN Registered Nurse    Michelle Diaz RN Registered Nurse    Reuben Johnson RN  Registered Nurse                    Physical Therapy Education       Title: PT OT SLP Therapies (In Progress)       Topic: Physical Therapy (Done)       Point: Mobility training (Done)       Learning Progress Summary             Patient Acceptance, E,D, DU,VU by  at 8/4/2024 1457    Comment: benefits of PT and POC, call for A OOB                         Point: Precautions (Done)       Learning Progress Summary             Patient Acceptance, E,D, DU,VU by  at 8/4/2024 1457    Comment: benefits of PT and POC, call for A OOB                                         User Key       Initials Effective Dates Name Provider Type Discipline     02/03/23 -  Daniel Garcia D, PT Physical Therapist PT                  PT Recommendation and Plan     Plan of Care Reviewed With: patient  Progress: improving  Outcome Evaluation: PT tx completed. Pt in bed and agrees to therapy. S for bed mobility. Required assist for donning of L surgical shoe. No c/o pain. Pt stands with CGA. Amb 50' at a time with RWX and CGA. As pt fatigeus he requires more cues for safe gait mechanics. Noted L decreased foot clearance at times. Will cont to follow.   Outcome Measures       Row Name 08/04/24 1500             How much help from another is currently needed...    Putting on and taking off regular lower body clothing? 1  -EC      Bathing (including washing, rinsing, and drying) 2  -EC      Toileting (which includes using toilet bed pan or urinal) 2  -EC      Putting on and taking off regular upper body clothing 3  -EC      Taking care of personal grooming (such as brushing teeth) 3  -EC      Eating meals 4  -EC      AM-PAC 6 Clicks Score (OT) 15  -EC         Functional Assessment    Outcome Measure Options AM-PAC 6 Clicks Daily Activity (OT)  -EC                User Key  (r) = Recorded By, (t) = Taken By, (c) = Cosigned By      Initials Name Provider Type    EC Matilda Bryant, OTR/L Occupational Therapist                     Time Calculation:     PT Charges       Row Name 08/06/24 1429             Time Calculation    Start Time 1046  -LY      Stop Time 1100  -LY      Time Calculation (min) 14 min  -LY      PT Received On 08/06/24  -LY         Time Calculation- PT    Total Timed Code Minutes- PT 14 minute(s)  -LY         Timed Charges    09602 - Gait Training Minutes  14  -LY         Total Minutes    Timed Charges Total Minutes 14  -LY       Total Minutes 14  -LY                User Key  (r) = Recorded By, (t) = Taken By, (c) = Cosigned By      Initials Name Provider Type    LY Alesha Dominguez PTA Physical Therapist Assistant                  Therapy Charges for Today       Code Description Service Date Service Provider Modifiers Qty    [id number] HC GAIT TRAINING EA 15 MIN 8/5/2024 Alesha Dominguez PTA GP 1    [id number] HC GAIT TRAINING EA 15 MIN 8/6/2024 Alesha Dominguez PTA GP 1            PT G-Codes  Outcome Measure Options: AM-PAC 6 Clicks Daily Activity (OT)  AM-PAC 6 Clicks Score (PT): 22  AM-PAC 6 Clicks Score (OT): 15    Alesha Dominguez PTA  8/6/2024

## 2024-08-06 NOTE — PROGRESS NOTES
"INFECTIOUS DISEASES PROGRESS NOTE    Patient:  Erick Luong  YOB: 1955  MRN: [id number]   Admit date: 7/31/2024   Admitting Physician: Alejandrina Barnett DO  Primary Care Physician: Del Shetty MD    Chief Complaint: None offered      Interval History: Patient offers no complaints.  He says he is wearing his offloading shoe when he is up to ambulate.      Denies rash or diarrhea    Allergies:   Allergies   Allergen Reactions    Cefepime Hives and Anaphylaxis    Bactrim [Sulfamethoxazole-Trimethoprim] Other (See Comments)     \"RENAL FAILURE\"    Vancomycin Itching    Zolpidem Mental Status Change     \"makes him crazy\"    Metronidazole Rash       Current Scheduled Medications:   ascorbic acid, 1,000 mg, Oral, Daily  donepezil, 10 mg, Oral, Daily  DULoxetine, 60 mg, Oral, Daily  enoxaparin, 1 mg/kg, Subcutaneous, Q12H  ertapenem, 1,000 mg, Intravenous, Q24H  famotidine, 20 mg, Oral, Q12H  ferric gluconate, 250 mg, Intravenous, Daily  insulin glargine, 10 Units, Subcutaneous, Daily  insulin regular, 3-14 Units, Subcutaneous, 4x Daily AC & at Bedtime  melatonin, 2.5 mg, Oral, Nightly  oxyCODONE, 10 mg, Oral, BID  pantoprazole, 40 mg, Oral, Q12H  polyethylene glycol, 17 g, Oral, Daily  pregabalin, 100 mg, Oral, Nightly  pregabalin, 50 mg, Oral, Daily  rosuvastatin, 10 mg, Oral, Nightly  senna-docusate sodium, 2 tablet, Oral, BID  sodium bicarbonate, 650 mg, Oral, TID  sodium chloride, 10 mL, Intravenous, Q12H  sodium chloride, 10 mL, Intravenous, Q12H  sodium hypochlorite, , Topical, Q12H      Current PRN Medications:    senna-docusate sodium **AND** polyethylene glycol **AND** bisacodyl **AND** bisacodyl    Calcium Replacement - Follow Nurse / BPA Driven Protocol    dextrose    dextrose    dextrose    glucagon (human recombinant)    Magnesium Standard Dose Replacement - Follow Nurse / BPA Driven Protocol    nitroglycerin    ondansetron    Pharmacy to Dose enoxaparin (LOVENOX)    Phosphorus " "Replacement - Follow Nurse / BPA Driven Protocol    Potassium Replacement - Follow Nurse / BPA Driven Protocol    sodium chloride    sodium chloride    sodium chloride    sodium chloride    Pharmacy to Dose enoxaparin (LOVENOX),   sodium chloride, 50 mL/hr, Last Rate: Stopped (24)           Objective     Vital Signs:  Temp (24hrs), Av.7 °F (36.5 °C), Min:97.4 °F (36.3 °C), Max:98.3 °F (36.8 °C)      /86 (BP Location: Right arm, Patient Position: Lying)   Pulse 73   Temp 97.5 °F (36.4 °C) (Oral)   Resp 18   Ht 182.9 cm (72\")   Wt 132 kg (291 lb 6.4 oz)   SpO2 96%   BMI 39.52 kg/m²         Physical Exam:    General: Patient is an obese male lying in bed in no acute distress  Respiratory: Effort even and unlabored, he was not conversationally dyspneic  Left foot dressing in place with sock over it.  Sock was partially removed.  Packing is in place involving the left heel.  Lateral Betadine soaked dressing in place as well.  No fluctuance or induration appreciated in the left lateral portion of the foot      Results Review:    I reviewed the patient's new clinical results.    Lab Results:    CBC:   Lab Results   Lab 24  1849 24  0419 24  0558 24  0605 24  0615 24  0444   WBC 15.48* 12.83* 9.76 5.75 5.73 5.89   HEMOGLOBIN 9.8* 9.0* 10.0* 10.3* 9.5* 9.5*   HEMATOCRIT 29.6* 28.3* 31.8* 33.2* 30.5* 30.1*   PLATELETS 201 176 219 215 201 210        AutoDiff:   Lab Results   Lab 24  0605 24  0615 24  0444   NEUTROPHIL % 57.9 60.7 64.0   LYMPHOCYTE % 20.9 20.1 19.9   MONOCYTES % 8.7 9.6 9.2   EOSINOPHIL % 11.1* 7.9* 4.8   BASOPHIL % 0.9 0.7 0.7   NEUTROS ABS 3.33 3.48 3.78   LYMPHS ABS 1.20 1.15 1.17   MONOS ABS 0.50 0.55 0.54   EOS ABS 0.64* 0.45* 0.28   BASOS ABS 0.05 0.04 0.04        Manual Diff:    Lab Results   Lab 24  0605 24  0615 24  0444   NEUTROS ABS 3.33 3.48 3.78           CMP:   Lab Results   Lab 24  0605 " 08/04/24  0615 08/05/24  0444 08/06/24  0508   SODIUM 143 145 144 143   POTASSIUM 3.8 3.9 3.9 4.1   CHLORIDE 110* 111* 112* 109*   CO2 19.0* 21.0* 21.0* 22.0   BUN 31* 24* 20 18   CREATININE 1.82* 1.60* 1.47* 1.47*   CALCIUM 8.3* 8.5* 8.3* 8.4*   BILIRUBIN 0.3 0.3 0.3  --    ALK PHOS 88 95 102  --    ALT (SGPT) 7 9 9  --    AST (SGOT) 12 15 16  --    GLUCOSE 125* 97 89 101*       Estimated Creatinine Clearance: 67.6 mL/min (A) (by C-G formula based on SCr of 1.47 mg/dL (H)).    Culture Results:    Microbiology Results (last 10 days)       Procedure Component Value - Date/Time    Wound Culture - Drainage, Foot, Left [[id number]]  (Abnormal) Collected: 08/02/24 1338    Lab Status: Final result Specimen: Drainage from Foot, Left Updated: 08/04/24 0857     Wound Culture Light growth (2+) Streptococcus agalactiae (Group B)     Comment:   This organism is considered to be universally susceptible to penicillin.  No further antibiotic testing will be performed. If Clindamycin or Erythromycin is the drug of choice, notify the laboratory within 7 days to request susceptibility testing.        Gram Stain Rare (1+) WBCs seen      No organisms seen    Wound Culture - Wound, Foot, Left [[id number]]  (Abnormal) Collected: 08/02/24 1032    Lab Status: Final result Specimen: Wound from Foot, Left Updated: 08/04/24 0857     Wound Culture Light growth (2+) Streptococcus agalactiae (Group B)     Comment:   This organism is considered to be universally susceptible to penicillin.  No further antibiotic testing will be performed. If Clindamycin or Erythromycin is the drug of choice, notify the laboratory within 7 days to request susceptibility testing.        Gram Stain Few (2+) WBCs seen      Rare (1+) Gram positive cocci    Eosinophil Smear - Urine, Urine, Clean Catch [[id number]]  (Normal) Collected: 08/01/24 1547    Lab Status: Final result Specimen: Urine, Clean Catch Updated: 08/02/24 0149     Eosinophil Smear 0 % EOS/100 Cells      MRSA Screen, PCR (Inpatient) - Swab, Nares [[id number]]  (Normal) Collected: 08/01/24 0206    Lab Status: Final result Specimen: Swab from Nares Updated: 08/01/24 0346     MRSA PCR No MRSA Detected    Narrative:      The negative predictive value of this diagnostic test is high and should only be used to consider de-escalating anti-MRSA therapy. A positive result may indicate colonization with MRSA and must be correlated clinically.    Urine Culture - Urine, Straight Cath [[id number]]  (Abnormal)  (Susceptibility) Collected: 07/31/24 1851    Lab Status: Final result Specimen: Urine from Straight Cath Updated: 08/04/24 1129     Urine Culture >100,000 CFU/mL Escherichia coli ESBL    Narrative:      Colonization of the urinary tract without infection is common. Treatment is discouraged unless the patient is symptomatic, pregnant, or undergoing an invasive urologic procedure.  Recent outcomes data supports the use of pip/tazo in the treatment of susceptible ESBL infections for uncomplicated UTI. Consider use of pip/tazo as a carbapenem-sparing regimen in applicable patients.    Susceptibility        Escherichia coli ESBL      MATT      Amikacin Susceptible      Ertapenem Susceptible      Gentamicin Resistant      Levofloxacin Susceptible      Meropenem Susceptible      Nitrofurantoin Susceptible      Piperacillin + Tazobactam Susceptible      Tobramycin Resistant      Trimethoprim + Sulfamethoxazole Resistant                           Blood Culture - Blood, Hand, Left [[id number]]  (Normal) Collected: 07/31/24 1849    Lab Status: Final result Specimen: Blood from Hand, Left Updated: 08/05/24 1915     Blood Culture No growth at 5 days    Blood Culture - Blood, Arm, Left [[id number]]  (Abnormal) Collected: 07/31/24 1849    Lab Status: Final result Specimen: Blood from Arm, Left Updated: 08/02/24 0545     Blood Culture Staphylococcus, coagulase negative     Isolated from Aerobic Bottle     Gram Stain Aerobic Bottle Gram  positive cocci in clusters    Narrative:      Probable contaminant requires clinical correlation, susceptibility not performed unless requested by physician.      Blood Culture ID, PCR - Blood, Arm, Left [[id number]]  (Abnormal) Collected: 07/31/24 1849    Lab Status: Final result Specimen: Blood from Arm, Left Updated: 08/01/24 1117     BCID, PCR Staph spp, not aureus or lugdunensis. Identification by BCID2 PCR.     BOTTLE TYPE Aerobic Bottle                 Radiology:   Imaging Results (Last 72 Hours)       ** No results found for the last 72 hours. **                Active Hospital Problems    Diagnosis     **DKA, type 2, not at goal     E. coli UTI, ESBL     Atrial fibrillation     Chronic heart failure with preserved ejection fraction (HFpEF)     Chronic diastolic heart failure     GERD without esophagitis     Diabetic ulcer of left foot associated with type 2 diabetes mellitus        IMPRESSION:  Diabetic foot infection-cultures on 2 separate occasions positive for group B streptococcus including debrided cultures obtained by SUSAN Hoffmann.  Urinary tract infection with ESBL E. coli-this was documented as a straight cath specimen.  Patient did complain of some frequency.  History of osteomyelitis with MRSA status posttreatment with vancomycin and transition to linezolid.    Uncontrolled diabetes mellitus with hemoglobin A1c greater than 12.    Chronic kidney disease stage IIIb with acute kidney injury.  Creatinine continues to improve.  Positive blood cultures for coagulase-negative staph-contaminant    RECOMMENDATION:   Continue ertapenem (today is day #3) to cover both ESBL in urine as well as group B streptococcus-will likely treat approximately 5 days for the urinary tract infection and assess need for ongoing therapy with p.o. for the left foot.  Dressing changes per podiatry.   Diabetes management per attending  Noted recommendations for skilled nursing facility for better wound care in this  patient    Discussed with Dr. Barnett.  Agree patient not able to take care of himself at home        Julia Adrian MD  08/06/24  08:42 CDT

## 2024-08-06 NOTE — CASE MANAGEMENT/SOCIAL WORK
Continued Stay Note  [facility]     Patient Name: Erick Luong  MRN: [id number]  Today's Date: 8/6/2024    Admit Date: 7/31/2024    Plan: Referral given to Domenica from [facility] [phone].   Discharge Plan       Row Name 08/06/24 1206       Plan    Plan Referral given to Domenica from [facility] [phone].                   Discharge Codes    No documentation.                 Expected Discharge Date and Time       Expected Discharge Date Expected Discharge Time    Aug 7, 2024               SUZIE Reno

## 2024-08-06 NOTE — PLAN OF CARE
Goal Outcome Evaluation:  Plan of Care Reviewed With: patient        Progress: improving  Outcome Evaluation: Nutrition follow up. Pt's po intake is variable but does seem to be improving. He reports his appetite is getting better as he is feeling better. Weight tends to fluctuate. He is receiving a C.CHO diet. He has consumed 42% of the last 3 meals. Encouraged intake and advised of alternate selections as needed. He has a pending LTAC referral to [facility] with antibiotic treatment. Will follow.

## 2024-08-06 NOTE — PAYOR COMM NOTE
"Erick Luong (69 y.o. Male)  [id number]   CONT CLINICAL   Please review clinical for Additional Days      [facility] phone     fax         Date of Birth   1955    Social Security Number       Address   [address] [place] [address]    Home Phone       MRN   [id number]       [employer]   [facility]    Marital Status                               Admission Date   7/31/24    Admission Type   Emergency    Admitting Provider   Alejandrina Barnett DO    Attending Provider   Alejandrina Barnett DO    Department, Room/Bed   [facility] 3C, 365/1       Discharge Date       Discharge Disposition       Discharge Destination                                 Attending Provider: Alejandrina Barnett DO    Allergies: Cefepime, Bactrim [Sulfamethoxazole-trimethoprim], Vancomycin, Zolpidem, Metronidazole    Isolation: Contact   Infection: MRSA (05/19/19), COVID (History) (08/08/22), ESBL E coli (06/30/24)   Code Status: CPR    Ht: 182.9 cm (72\")   Wt: 132 kg (291 lb 6.4 oz)    Admission Cmt: None   Principal Problem: DKA, type 2, not at goal [E11.10]                   Active Insurance as of 7/31/2024       Primary Coverage       Payor Plan Insurance Group Employer/Plan Group    [employer] [facility] [employer] EMPLOYEE [id number]       Payor Plan Address Payor Plan Phone Number Payor Plan Fax Number Effective Dates    [address] [phone]  1/1/2022 - None Entered    [facility] [id number]         Subscriber Name Subscriber Birth Date Member ID       ZAINAB LUONG 11/27/1969 [id number]               Secondary Coverage       Payor Plan Insurance Group Employer/Plan Group    MEDICARE MEDICARE A & B        Payor Plan Address Payor Plan Phone Number Payor Plan Fax Number Effective Dates    [address] [phone]  7/1/2013 - None Entered    [facility] [id number]         Subscriber Name Subscriber Birth Date Member ID       ERICK LUONG 1955 " [id number]                     Emergency Contacts        (Rel.) Home Phone Work Phone Mobile Phone    Joan Luong (Spouse) [phone]              Vital Signs (last day)       Date/Time Temp Temp src Pulse Resp BP Patient Position SpO2    08/06/24 1151 98.5 (36.9) Oral 74 17 169/95 Sitting 98    08/06/24 0802 97.5 (36.4) Oral 73 18 170/86 Lying 96    08/06/24 0408 97.5 (36.4) Oral 73 18 134/59 Lying 94    08/05/24 2350 98.3 (36.8) Oral 73 18 125/52 Lying 95    08/05/24 2005 97.8 (36.6) Oral 77 16 151/65 Lying 94    08/05/24 1617 97.5 (36.4) Oral 72 16 152/82 Lying 98    08/05/24 1159 97.4 (36.3) Oral 69 16 155/76 Lying 97    08/05/24 0439 97.7 (36.5) Oral 69 18 121/55 Lying 97    08/05/24 0047 97.7 (36.5) Oral 69 18 139/60 Lying 96          Current Facility-Administered Medications   Medication Dose Route Frequency Provider Last Rate Last Admin    ascorbic acid (VITAMIN C) tablet 1,000 mg  1,000 mg Oral Daily Reuben Garza APRN   1,000 mg at 08/06/24 0958    sennosides-docusate (PERICOLACE) 8.6-50 MG per tablet 2 tablet  2 tablet Oral BID Lamine Figueroa APRN   2 tablet at 08/06/24 0958    And    polyethylene glycol (MIRALAX) packet 17 g  17 g Oral Daily PRN Lamine Figueroa APRN        And    bisacodyl (DULCOLAX) EC tablet 5 mg  5 mg Oral Daily PRN Lamine Figueroa APRN        And    bisacodyl (DULCOLAX) suppository 10 mg  10 mg Rectal Daily PRN Lamine Figueroa APRN   10 mg at 08/02/24 0941    Calcium Replacement - Follow Nurse / BPA Driven Protocol   Does not apply PRN Abebe Garzon,         dextrose (D50W) (25 g/50 mL) IV injection 10-50 mL  10-50 mL Intravenous Q15 Min PRN Abebe Garzon DO        dextrose (D50W) (25 g/50 mL) IV injection 25 g  25 g Intravenous Q15 Min PRN Lamine Figueroa APRN        dextrose (GLUTOSE) oral gel 15 g  15 g Oral Q15 Min PRN Lamine Figueroa APRN        donepezil (ARICEPT) tablet 10 mg  10 mg Oral Daily Greg  SUSAN Alexis   10 mg at 08/06/24 0958    DULoxetine (CYMBALTA) DR capsule 60 mg  60 mg Oral Daily Reuben Garza APRN   60 mg at 08/06/24 0958    Enoxaparin Sodium (LOVENOX) syringe 135 mg  1 mg/kg Subcutaneous Q12H Abebe Garzon DO   135 mg at 08/06/24 0958    ertapenem (INVanz) 1,000 mg in sodium chloride 0.9 % 100 mL MBP  1,000 mg Intravenous Q24H Julia Adrian  mL/hr at 08/05/24 1419 1,000 mg at 08/05/24 1419    famotidine (PEPCID) tablet 20 mg  20 mg Oral Q12H Reuben Garza APRN   20 mg at 08/06/24 1000    ferric gluconate (FERRLECIT) 250 mg in sodium chloride 0.9 % 250 mL IVPB  250 mg Intravenous Daily Brian Lomas  mL/hr at 08/06/24 1213 250 mg at 08/06/24 1213    glucagon (GLUCAGEN) injection 1 mg  1 mg Intramuscular Q15 Min PRN Lamine Figueroa APRN        insulin glargine (LANTUS, SEMGLEE) injection 10 Units  10 Units Subcutaneous Daily Keren Jamison MD   10 Units at 08/06/24 0958    insulin regular (humuLIN R,novoLIN R) injection 3-14 Units  3-14 Units Subcutaneous 4x Daily AC & at Bedtime Keren Jamison MD   3 Units at 08/03/24 2033    Magnesium Standard Dose Replacement - Follow Nurse / BPA Driven Protocol   Does not apply PRN Abebe Garzon DO        melatonin tablet 2.5 mg  2.5 mg Oral Nightly Reuben Garza APRN   2.5 mg at 08/05/24 2226    nitroglycerin (NITROSTAT) SL tablet 0.4 mg  0.4 mg Sublingual Q5 Min PRN Lamine Figueroa APRN        ondansetron (ZOFRAN) injection 4 mg  4 mg Intravenous Q6H PRN Payam Keyes DO   4 mg at 08/05/24 0800    pantoprazole (PROTONIX) EC tablet 40 mg  40 mg Oral Q12H Reuben Garza APRN   40 mg at 08/06/24 0958    Pharmacy to Dose enoxaparin (LOVENOX)   Does not apply Continuous PRN Abebe Garzon DO        Phosphorus Replacement - Follow Nurse / BPA Driven Protocol   Does not apply PRN Abebe Garzon DO        polyethylene glycol (MIRALAX) packet 17 g  17 g Oral Daily Reuben Garza APRN   17 g  at 08/06/24 0959    Potassium Replacement - Follow Nurse / BPA Driven Protocol   Does not apply PRN Abebe Garzon DO        pregabalin (LYRICA) capsule 100 mg  100 mg Oral Nightly Reuben Garza APRN   100 mg at 08/05/24 2226    pregabalin (LYRICA) capsule 50 mg  50 mg Oral Daily Reuben Garza APRN   50 mg at 08/06/24 0958    rosuvastatin (CRESTOR) tablet 10 mg  10 mg Oral Nightly Reuben Garza APRN   10 mg at 08/05/24 2232    sodium bicarbonate tablet 650 mg  650 mg Oral TID Brian Lomas MD   650 mg at 08/06/24 0958    sodium chloride 0.9 % flush 10 mL  10 mL Intravenous Q12H Abebe Garzon DO   10 mL at 08/05/24 2233    sodium chloride 0.9 % flush 10 mL  10 mL Intravenous PRN Abebe Garzon DO        sodium chloride 0.9 % flush 10 mL  10 mL Intravenous Q12H Lamine Figueroa APRN   10 mL at 08/05/24 2232    sodium chloride 0.9 % flush 10 mL  10 mL Intravenous PRN Lamine Figueroa APRN        sodium chloride 0.9 % infusion 40 mL  40 mL Intravenous PRN Abebe Garzon DO        sodium chloride 0.9 % infusion 40 mL  40 mL Intravenous PRN Lamine Figueroa APRN        sodium chloride 0.9 % infusion  50 mL/hr Intravenous Continuous Brian Lomas MD   Stopped at 08/04/24 2235    sodium hypochlorite (DAKIN'S 1/4 STRENGTH) 0.125 % topical solution 0.125% solution   Topical Q12H Aby High APRN   Given at 08/06/24 1000        Physician Progress Notes (last 24 hours)        Alejandrina Barnett DO at 08/06/24 1140              [facility] Medicine Services  INPATIENT PROGRESS NOTE    Patient Name: Erick Loung  Date of Admission: 7/31/2024  Today's Date: 08/06/24  Length of Stay: 6  Primary Care Physician: Del Shetty MD    Subjective   Chief Complaint: Continues to complain of weakness.     HPI   Patient notes he had not walked today at time of examination.  Continues on IV antibiotic.   Patient acknowlegdes that he can't take care of himself.   Wife is in hospital .    Discussed that it would be in his best interest to go to LTACH for continued antibiotic and therapy if he would qualify.  He verbalized agreement for me to make order for LTACH referral.     Patient denied pain at time of exam.        Review of Systems   All pertinent negatives and positives are as above. All other systems have been reviewed and are negative unless otherwise stated.     Objective    Temp:  [97.4 °F (36.3 °C)-98.3 °F (36.8 °C)] 97.5 °F (36.4 °C)  Heart Rate:  [69-77] 73  Resp:  [16-18] 18  BP: (125-170)/(52-86) 170/86  Physical Exam  Vitals and nursing note reviewed.   Constitutional:       Appearance: He is obese.   HENT:      Head: Normocephalic and atraumatic.      Right Ear: External ear normal.      Left Ear: External ear normal.      Nose: Nose normal.      Mouth/Throat:      Mouth: Mucous membranes are moist.   Eyes:      Extraocular Movements: Extraocular movements intact.      Conjunctiva/sclera: Conjunctivae normal.      Pupils: Pupils are equal, round, and reactive to light.   Cardiovascular:      Rate and Rhythm: Normal rate and regular rhythm.      Pulses: Normal pulses.      Heart sounds: No murmur heard.     No friction rub. No gallop.   Pulmonary:      Effort: Pulmonary effort is normal.      Breath sounds: Normal breath sounds.   Abdominal:      General: Bowel sounds are normal.      Palpations: Abdomen is soft.   Musculoskeletal:      Cervical back: Normal range of motion and neck supple.      Comments: Moves all extremities.  Dressing to left heel ulcer intact.   Skin:     General: Skin is warm and dry.      Capillary Refill: Capillary refill takes less than 2 seconds.   Neurological:      General: No focal deficit present.      Mental Status: He is alert and oriented to person, place, and time.      Cranial Nerves: No cranial nerve deficit.   Psychiatric:         Behavior: Behavior normal.      Comments: Mood is apathetic             Results Review:  I  have reviewed the labs, radiology results, and diagnostic studies.    Laboratory Data:   Results from last 7 days   Lab Units 08/05/24  0444 08/04/24  0615 08/03/24  0605   WBC 10*3/mm3 5.89 5.73 5.75   HEMOGLOBIN g/dL 9.5* 9.5* 10.3*   HEMATOCRIT % 30.1* 30.5* 33.2*   PLATELETS 10*3/mm3 210 201 215        Results from last 7 days   Lab Units 08/06/24  0508 08/05/24  0444 08/04/24  0615 08/03/24  0605   SODIUM mmol/L 143 144 145 143   POTASSIUM mmol/L 4.1 3.9 3.9 3.8   CHLORIDE mmol/L 109* 112* 111* 110*   CO2 mmol/L 22.0 21.0* 21.0* 19.0*   BUN mg/dL 18 20 24* 31*   CREATININE mg/dL 1.47* 1.47* 1.60* 1.82*   CALCIUM mg/dL 8.4* 8.3* 8.5* 8.3*   BILIRUBIN mg/dL  --  0.3 0.3 0.3   ALK PHOS U/L  --  102 95 88   ALT (SGPT) U/L  --  9 9 7   AST (SGOT) U/L  --  16 15 12   GLUCOSE mg/dL 101* 89 97 125*       Culture Data:   Blood Culture   Date Value Ref Range Status   07/31/2024 No growth at 5 days  Final   07/31/2024 Staphylococcus, coagulase negative (C)  Final     Urine Culture   Date Value Ref Range Status   07/31/2024 >100,000 CFU/mL Escherichia coli ESBL (A)  Final     Wound Culture   Date Value Ref Range Status   08/02/2024 (A)  Final    Light growth (2+) Streptococcus agalactiae (Group B)     Comment:       This organism is considered to be universally susceptible to penicillin.  No further antibiotic testing will be performed. If Clindamycin or Erythromycin is the drug of choice, notify the laboratory within 7 days to request susceptibility testing.   08/02/2024 (A)  Final    Light growth (2+) Streptococcus agalactiae (Group B)     Comment:       This organism is considered to be universally susceptible to penicillin.  No further antibiotic testing will be performed. If Clindamycin or Erythromycin is the drug of choice, notify the laboratory within 7 days to request susceptibility testing.       Radiology Data:   Imaging Results (Last 24 Hours)       ** No results found for the last 24 hours. **            I have  reviewed the patient's current medications.     Assessment/Plan   Assessment  Active Hospital Problems    Diagnosis     **DKA, type 2, not at goal     E. coli UTI, ESBL     Atrial fibrillation     Chronic heart failure with preserved ejection fraction (HFpEF)     Chronic diastolic heart failure     GERD without esophagitis     Diabetic ulcer of left foot associated with type 2 diabetes mellitus        Treatment Plan  LTACH referral  Continue antibiotics  Encourage out of bed/ambulate.   Continue to monitor glucose.     Medical Decision Making  Number and Complexity of problems:   3 acute, high complexity problems  4+ chronic, moderate complexity problems     Differential Diagnosis: None     Conditions and Status        Condition is improving.     [facility] Data  External documents reviewed: Care Everywhere  Cardiac tracing (EKG, telemetry) interpretation: See HPI  Radiology interpretation: See HPI  Labs reviewed: See HPI  Any tests that were considered but not ordered: None     Decision rules/scores evaluated (example [id number], Wells, etc): None     Discussed with: The patient     Care Planning  Shared decision making: Patient  Code status and discussions: Full code     Disposition  Social Determinants of Health that impact treatment or disposition: none     I expect the patient to be discharged to ?LTACH  in 1-2 days. .     Electronically signed by Alejandrina Barnett DO, 08/06/24, 11:40 CDT.      Electronically signed by Alejandrina Barnett DO at 08/06/24 1151       Stewart Alvarez, SUSAN at 08/06/24 1007          Nephrology ([facility]) Progress Note      Patient:  Erick Luong  YOB: 1955  Date of Service: 8/6/2024  MRN: [id number]   Acct: [id number]   Primary Care Physician: Del Shetty MD  Advance Directive:   Code Status and Medical Interventions: CPR (Attempt to Resuscitate); Full Support   Ordered at: 08/01/24 0053     Level Of Support Discussed With:     Patient     Code Status (Patient has no pulse and is not breathing):    CPR (Attempt to Resuscitate)     Medical Interventions (Patient has pulse or is breathing):    Full Support     Admit Date: 7/31/2024       Hospital Day: 6  Referring Provider: Abebe Garzon DO      Patient personally seen and examined.  Complete chart including Consults, Notes, Operative Reports, Labs, Cardiology, and Radiology studies reviewed as able.        Subjective:  Erick Luong is a 69 y.o. male for whom we were consulted for evaluation and treatment of acute kidney injury. Baseline chronic kidney disease stage 3b. Baseline creatinine approximately 1.5-1.8. Follows in our office.  History of poorly controlled type 2 diabetes, hypertension, coronary artery disease, diabetic foot ulcer, obesity.  On 7/31 patient was found wandering at home, confused. Brought to ER for evaluation. Has a nonhealing left foot diabetic ulcer with serosanguineous drainage. Blood glucose level was >700 with A1c >12%. Initial creatinine was 2.67 and has steadily improved since he was admitted. Nephrology consulted on 8/01. Hospital course remarkable for improving renal function and improved blood glucose levels. Moved to medical floor    Today is awake and alert. No new complaint. No new overnight issues. Urine output nonoliguric    Allergies:  Cefepime, Bactrim [sulfamethoxazole-trimethoprim], Vancomycin, Zolpidem, and Metronidazole    Home Meds:  Medications Prior to Admission   Medication Sig Dispense Refill Last Dose    ascorbic acid (VITAMIN C) 1000 MG tablet Take 1 tablet by mouth Daily. 30 tablet 3     bumetanide (BUMEX) 1 MG tablet Take 1 tablet by mouth 2 (Two) Times a Day for 30 days. 60 tablet 0     busPIRone (BUSPAR) 10 MG tablet Take 1 tablet by mouth 2 (Two) Times a Day.       calcitriol (ROCALTROL) 0.5 MCG capsule Take 1 capsule by mouth Daily. 90 capsule 4     carvedilol (COREG) 3.125 MG tablet Take 1 tablet twice a day by oral route. 60  tablet 4     donepezil (ARICEPT) 10 MG tablet Take 1 tablet by mouth Daily. 90 tablet 1     DULoxetine (CYMBALTA) 60 MG capsule Take 1 capsule by mouth Daily. 90 capsule 4     famotidine (PEPCID) 20 MG tablet Take 1 tablet twice a day by oral route. 180 tablet 4     Insulin Glargine (Lantus SoloStar) 100 UNIT/ML injection pen Inject 20 Units under the skin into the appropriate area as directed every night at bedtime. 15 mL 3     Insulin Regular Human, Conc, (HumuLIN R) 500 UNIT/ML solution pen-injector CONCENTRATED injection Inject 40 Units under the skin into the appropriate area as directed 2 (Two) Times a Day Before Meals. Inject 40 units under the skin in the the appropriate area with regular meals AND 40 units with large meals.       Iron-Vitamin C (Vitron-C)  MG tablet Take 1 tablet twice a day by oral route. 60 tablet 2     levocetirizine (XYZAL) 5 MG tablet Take 1 tablet by mouth every day at bedtime. 30 tablet 2     melatonin 3 MG tablet Take 2 tablets by mouth At Night As Needed for Sleep. 30 tablet 0     oxyCODONE (ROXICODONE) 10 MG tablet Take 1 tablet by mouth 2 (Two) Times a Day As Needed. Must last 30 days per md. 55 tablet 0     pantoprazole (PROTONIX) 40 MG EC tablet Take 1 tablet by mouth Every 12 (Twelve) Hours. 180 tablet 4     pregabalin (LYRICA) 100 MG capsule Take 1 capsule by mouth Daily.       pregabalin (LYRICA) 100 MG capsule Take 2 capsules by mouth every night at bedtime.       rosuvastatin (CRESTOR) 10 MG tablet Take 1 tablet by mouth Every Night. 30 tablet 2     sucralfate (CARAFATE) 1 g tablet Take 1 tablet by mouth 4 (Four) Times a Day before meals. 360 tablet 1     Diclofenac Sodium (VOLTAREN) 1 % gel gel Apply 2 g topically to the appropriate area as directed 4 (Four) Times a Day As Needed. 300 g 11     nitroglycerin (NITROSTAT) 0.4 MG SL tablet Place 1 tablet under the tongue Every 5 (Five) Minutes As Needed for Chest Pain. Take no more than 3 doses in 15 minutes.        polyethylene glycol (MIRALAX) 17 GM/SCOOP powder Take 17 g by mouth Daily As Needed (constipation).       sennosides-docusate (PERICOLACE) 8.6-50 MG per tablet Take 1 tablet by mouth Every Night. Obtain OTC          Medicines:  Current Facility-Administered Medications   Medication Dose Route Frequency Provider Last Rate Last Admin    ascorbic acid (VITAMIN C) tablet 1,000 mg  1,000 mg Oral Daily Reuben Garza APRN   1,000 mg at 08/06/24 0958    sennosides-docusate (PERICOLACE) 8.6-50 MG per tablet 2 tablet  2 tablet Oral BID Lamine Figueroa APRN   2 tablet at 08/06/24 0958    And    polyethylene glycol (MIRALAX) packet 17 g  17 g Oral Daily PRN Lamine Figueroa APRN        And    bisacodyl (DULCOLAX) EC tablet 5 mg  5 mg Oral Daily PRN Lamine Figueroa APRN        And    bisacodyl (DULCOLAX) suppository 10 mg  10 mg Rectal Daily PRN Lamine Figueroa APRN   10 mg at 08/02/24 0941    Calcium Replacement - Follow Nurse / BPA Driven Protocol   Does not apply PRN Abebe Garzon DO        dextrose (D50W) (25 g/50 mL) IV injection 10-50 mL  10-50 mL Intravenous Q15 Min PRN Abebe Garzon DO        dextrose (D50W) (25 g/50 mL) IV injection 25 g  25 g Intravenous Q15 Min PRN Lamine Figueroa APRN        dextrose (GLUTOSE) oral gel 15 g  15 g Oral Q15 Min PRN Lamine Figueroa APRN        donepezil (ARICEPT) tablet 10 mg  10 mg Oral Daily Reuben Garza APRN   10 mg at 08/06/24 0958    DULoxetine (CYMBALTA) DR capsule 60 mg  60 mg Oral Daily Reuben Garza APRN   60 mg at 08/06/24 0958    Enoxaparin Sodium (LOVENOX) syringe 135 mg  1 mg/kg Subcutaneous Q12H Abebe Garzon DO   135 mg at 08/06/24 0958    ertapenem (INVanz) 1,000 mg in sodium chloride 0.9 % 100 mL MBP  1,000 mg Intravenous Q24H Julia Adrian  mL/hr at 08/05/24 1419 1,000 mg at 08/05/24 1419    famotidine (PEPCID) tablet 20 mg  20 mg Oral Q12H Reuben Garza APRN   20 mg at 08/06/24 1000    ferric gluconate (FERRLECIT)  250 mg in sodium chloride 0.9 % 250 mL IVPB  250 mg Intravenous Daily Brian Lomas  mL/hr at 08/05/24 0833 250 mg at 08/05/24 0833    glucagon (GLUCAGEN) injection 1 mg  1 mg Intramuscular Q15 Min PRN Lamine Figueroa APRN        insulin glargine (LANTUS, SEMGLEE) injection 10 Units  10 Units Subcutaneous Daily Keren Jamison MD   10 Units at 08/06/24 0958    insulin regular (humuLIN R,novoLIN R) injection 3-14 Units  3-14 Units Subcutaneous 4x Daily AC & at Bedtime Keren Jamison MD   3 Units at 08/03/24 2033    Magnesium Standard Dose Replacement - Follow Nurse / BPA Driven Protocol   Does not apply PRN Abebe Garzon DO        melatonin tablet 2.5 mg  2.5 mg Oral Nightly Reuben Garza APRN   2.5 mg at 08/05/24 2226    nitroglycerin (NITROSTAT) SL tablet 0.4 mg  0.4 mg Sublingual Q5 Min PRN Lamine Figueroa APRN        ondansetron (ZOFRAN) injection 4 mg  4 mg Intravenous Q6H PRN Payam Keyes DO   4 mg at 08/05/24 0800    pantoprazole (PROTONIX) EC tablet 40 mg  40 mg Oral Q12H Reuben Garza APRN   40 mg at 08/06/24 0958    Pharmacy to Dose enoxaparin (LOVENOX)   Does not apply Continuous PRN Abebe Garzon DO        Phosphorus Replacement - Follow Nurse / BPA Driven Protocol   Does not apply PRN Abebe Garzon DO        polyethylene glycol (MIRALAX) packet 17 g  17 g Oral Daily Reuben Garza APRN   17 g at 08/06/24 0959    Potassium Replacement - Follow Nurse / BPA Driven Protocol   Does not apply PRN Aebbe Garzon DO        pregabalin (LYRICA) capsule 100 mg  100 mg Oral Nightly Reuben Garza APRN   100 mg at 08/05/24 2226    pregabalin (LYRICA) capsule 50 mg  50 mg Oral Daily Reuben Garza APRN   50 mg at 08/06/24 0958    rosuvastatin (CRESTOR) tablet 10 mg  10 mg Oral Nightly Reuben Garza APRN   10 mg at 08/05/24 2232    sodium bicarbonate tablet 650 mg  650 mg Oral TID Brian Lomas MD   650 mg at 08/06/24 0958    sodium chloride 0.9 % flush 10  mL  10 mL Intravenous Q12H Abebe Garzon, DO   10 mL at 08/05/24 2233    sodium chloride 0.9 % flush 10 mL  10 mL Intravenous PRN Abebe Garzon, DO        sodium chloride 0.9 % flush 10 mL  10 mL Intravenous Q12H Lamine Figueroa, APRN   10 mL at 08/05/24 2232    sodium chloride 0.9 % flush 10 mL  10 mL Intravenous PRN Lamine Figueroa, APRN        sodium chloride 0.9 % infusion 40 mL  40 mL Intravenous PRN Abebe Garzon, DO        sodium chloride 0.9 % infusion 40 mL  40 mL Intravenous PRN Lamine Figueroa, APRN        sodium chloride 0.9 % infusion  50 mL/hr Intravenous Continuous Brian Lomas MD   Stopped at 08/04/24 2235    sodium hypochlorite (DAKIN'S 1/4 STRENGTH) 0.125 % topical solution 0.125% solution   Topical Q12H Aby High, APRN   Given at 08/06/24 1000       Past Medical History:  Past Medical History:   Diagnosis Date    Arthritis     Autonomic disease     CAD (coronary artery disease) 02/06/2017    Cervical radiculopathy 09/16/2021    Chronic constipation with acute exaccerbation 05/10/2021    Coronary artery disease     Degeneration of cervical intervertebral disc 08/11/2021    Diabetes mellitus     Diabetic foot ulcer 08/31/2020    Diabetic polyneuropathy associated with type 2 diabetes mellitus 01/18/2021    Elevated cholesterol     Gastroesophageal reflux disease 05/13/2019    Headache     HTN (hypertension), benign 05/03/2017    Hyperlipidemia     Hypertension     Mixed hyperlipidemia 02/07/2017    Multiple lung nodules 01/26/2020    5mm, 9 mm RLL identified 1/2020, not present 10/2019.    Myocardial infarction     Osteomyelitis 01/22/2020    Osteomyelitis of fifth toe of right foot 10/07/2019    Pancreatitis     Persistent insomnia 01/20/2020    Renal disorder     Sleep apnea 02/06/2017    Sleep apnea with use of continuous positive airway pressure (CPAP)     NON-COMPLIANT    Slow transit constipation 01/16/2019    Spinal stenosis in cervical region 09/16/2021    Vitamin D  deficiency 03/02/2021       Past Surgical History:  Past Surgical History:   Procedure Laterality Date    ABDOMINAL SURGERY      AMPUTATION FOOT / TOE Left 10/2021    5th digit     ANTERIOR CERVICAL DISCECTOMY W/ FUSION N/A 08/05/2022    Procedure: CERVICAL DISCECTOMY ANTERIOR WITH FUSION C5-6 with possible plating of C5-7 with neuromonitoring and 1 c-arm;  Surgeon: Karel Soliz MD;  Location: [facility] OR;  Service: Neurosurgery;  Laterality: N/A;    APPENDECTOMY      BACK SURGERY      CARDIAC CATHETERIZATION Left 02/08/2021    Procedure: Left Heart Cath w poss intervention left anatomical snuff box acess;  Surgeon: Omkar Charles DO;  Location: [facility] CATH INVASIVE LOCATION;  Service: Cardiology;  Laterality: Left;    CARDIAC SURGERY      CATARACT EXTRACTION      CERVICAL SPINE SURGERY      COLONOSCOPY N/A 01/31/2017    Normal exam repeat in 5 years    COLONOSCOPY N/A 02/11/2019    Mild acute inflammation    COLONOSCOPY N/A 04/07/2024    2 areas at 10 and 20 cm with friability ulceration 2 clips placed at 20 cm and 4 clips at 10 cm poor prep normal mucosa,mild eroisions and ulcerations in visible vessels    COLONOSCOPY N/A 7/1/2024    Procedure: COLONOSCOPY WITH ANESTHESIA;  Surgeon: Arsalan Lorenzo DO;  Location: [facility] ENDOSCOPY;  Service: Gastroenterology;  Laterality: N/A;  pre op constipation/diarrhea  post poor prep  pcp Del Shetty    COLONOSCOPY N/A 7/2/2024    Procedure: COLONOSCOPY WITH ANESTHESIA;  Surgeon: Agapito Christopher MD;  Location: [facility] ENDOSCOPY;  Service: Gastroenterology;  Laterality: N/A;  pre rectal bleeding  post poor prep  pcp Del Shetty MD    COLONOSCOPY W/ POLYPECTOMY  03/04/2014    Hyperplastic polyp    CORONARY ARTERY BYPASS GRAFT  10/2015    ENDOSCOPY  04/13/2011    Gastritis with hemorrhage    ENDOSCOPY N/A 05/05/2017    Normal exam    ENDOSCOPY N/A 02/11/2019    Gastritis    ENDOSCOPY N/A 09/01/2020    Non-erosive gastritis with hemorrhage    ENDOSCOPY  N/A 02/10/2021    Esophagitis    ENDOSCOPY N/A 2024    There were esophageal mucosal changes suspicious for short-segment Low's esophagus present in the distal esophagus. The maximum longitudinal extent of these mucosal changes was 2 cm in length. Mucosa was biopsied with a cold forceps for histologyDistal esophagus, biopsies: Mild chronic active esophagogastritis. No evidence of intestinal metaplasia, dysplasia. Antrum, bx, Mild chronic gastritis    FOOT SURGERY Left     INCISION AND DRAINAGE OF WOUND Left 2007    spider bite       Family History  Family History   Problem Relation Age of Onset    Colon cancer Father     Heart disease Father     Colon cancer Sister     Colon polyps Sister     Alzheimer's disease Mother     Coronary artery disease Sister     Coronary artery disease Sister        Social History  Social History     Socioeconomic History    Marital status:    Tobacco Use    Smoking status: Former     Current packs/day: 0.00     Types: Cigarettes     Quit date:      Years since quittin.6    Smokeless tobacco: Never    Tobacco comments:     smoked in [employer]   Vaping Use    Vaping status: Never Used   Substance and Sexual Activity    Alcohol use: No    Drug use: No    Sexual activity: Defer       Review of Systems:  History obtained from chart review and the patient  General ROS: No fever or chills  Respiratory ROS: No cough, shortness of breath, wheezing  Cardiovascular ROS: No chest pain or palpitations  Gastrointestinal ROS: No abdominal pain or melena  Genito-Urinary ROS: No dysuria or hematuria  Psych ROS: No anxiety and depression  14 point ROS reviewed with the patient and negative except as noted above and in the HPI unless unable to obtain.    Objective:  Patient Vitals for the past 24 hrs:   BP Temp Temp src Pulse Resp SpO2 Weight   24 0802 170/86 97.5 °F (36.4 °C) Oral 73 18 96 % --   24 0630 -- -- -- -- -- -- 132 kg (291 lb 6.4 oz)   24 0408  134/59 97.5 °F (36.4 °C) Oral 73 18 94 % --   08/05/24 2350 125/52 98.3 °F (36.8 °C) Oral 73 18 95 % --   08/05/24 2005 151/65 97.8 °F (36.6 °C) Oral 77 16 94 % --   08/05/24 1617 152/82 97.5 °F (36.4 °C) Oral 72 16 98 % --   08/05/24 1159 155/76 97.4 °F (36.3 °C) Oral 69 16 97 % --       Intake/Output Summary (Last 24 hours) at 8/6/2024 1007  Last data filed at 8/6/2024 0408  Gross per 24 hour   Intake 240 ml   Output 700 ml   Net -460 ml     General: awake/alert   Chest:  clear to auscultation bilaterally without respiratory distress  CVS: regular rate and rhythm  Abdominal: soft, nontender, positive bowel sounds  Extremities: no cyanosis or edema  Skin:  left foot ulcer and warm and dry without rash      Labs:  Results from last 7 days   Lab Units 08/05/24  0444 08/04/24  0615 08/03/24  0605   WBC 10*3/mm3 5.89 5.73 5.75   HEMOGLOBIN g/dL 9.5* 9.5* 10.3*   HEMATOCRIT % 30.1* 30.5* 33.2*   PLATELETS 10*3/mm3 210 201 215         Results from last 7 days   Lab Units 08/06/24  0508 08/05/24  0444 08/04/24  0615 08/03/24  0605   SODIUM mmol/L 143 144 145 143   POTASSIUM mmol/L 4.1 3.9 3.9 3.8   CHLORIDE mmol/L 109* 112* 111* 110*   CO2 mmol/L 22.0 21.0* 21.0* 19.0*   BUN mg/dL 18 20 24* 31*   CREATININE mg/dL 1.47* 1.47* 1.60* 1.82*   CALCIUM mg/dL 8.4* 8.3* 8.5* 8.3*   EGFR mL/min/1.73 51.6* 51.6* 46.6* 40.0*   BILIRUBIN mg/dL  --  0.3 0.3 0.3   ALK PHOS U/L  --  102 95 88   ALT (SGPT) U/L  --  9 9 7   AST (SGOT) U/L  --  16 15 12   GLUCOSE mg/dL 101* 89 97 125*       Radiology:   Imaging Results (Last 72 Hours)       ** No results found for the last 72 hours. **            Culture:  Blood Culture   Date Value Ref Range Status   07/31/2024 No growth at 24 hours  Preliminary   07/31/2024 Staphylococcus, coagulase negative (C)  Final         Assessment    Acute kidney injury, ATN--resolving  Baseline chronic kidney disease stage 3b  Type 2 diabetes  Hypertension  Sepsis related to diabetic foot ulcer  Anemia of  "CKD  Obesity     Plan:   Encourage PO fluids  Renal function stable at baseline level  Monitor labs      SUSAN Calvert  8/6/2024  10:07 CDT      Electronically signed by Stewart Alvarez APRN at 08/06/24 1005       Julia Adrian MD at 08/06/24 0878          INFECTIOUS DISEASES PROGRESS NOTE    Patient:  Erick Luong  YOB: 1955  MRN: [id number]   Admit date: 7/31/2024   Admitting Physician: Alejandrina Barnett DO  Primary Care Physician: Del Shetty MD    Chief Complaint: None offered      Interval History: Patient offers no complaints.  He says he is wearing his offloading shoe when he is up to ambulate.      Denies rash or diarrhea    Allergies:   Allergies   Allergen Reactions    Cefepime Hives and Anaphylaxis    Bactrim [Sulfamethoxazole-Trimethoprim] Other (See Comments)     \"RENAL FAILURE\"    Vancomycin Itching    Zolpidem Mental Status Change     \"makes him crazy\"    Metronidazole Rash       Current Scheduled Medications:   ascorbic acid, 1,000 mg, Oral, Daily  donepezil, 10 mg, Oral, Daily  DULoxetine, 60 mg, Oral, Daily  enoxaparin, 1 mg/kg, Subcutaneous, Q12H  ertapenem, 1,000 mg, Intravenous, Q24H  famotidine, 20 mg, Oral, Q12H  ferric gluconate, 250 mg, Intravenous, Daily  insulin glargine, 10 Units, Subcutaneous, Daily  insulin regular, 3-14 Units, Subcutaneous, 4x Daily AC & at Bedtime  melatonin, 2.5 mg, Oral, Nightly  oxyCODONE, 10 mg, Oral, BID  pantoprazole, 40 mg, Oral, Q12H  polyethylene glycol, 17 g, Oral, Daily  pregabalin, 100 mg, Oral, Nightly  pregabalin, 50 mg, Oral, Daily  rosuvastatin, 10 mg, Oral, Nightly  senna-docusate sodium, 2 tablet, Oral, BID  sodium bicarbonate, 650 mg, Oral, TID  sodium chloride, 10 mL, Intravenous, Q12H  sodium chloride, 10 mL, Intravenous, Q12H  sodium hypochlorite, , Topical, Q12H      Current PRN Medications:    senna-docusate sodium **AND** polyethylene glycol **AND** bisacodyl **AND** bisacodyl    Calcium " "Replacement - Follow Nurse / BPA Driven Protocol    dextrose    dextrose    dextrose    glucagon (human recombinant)    Magnesium Standard Dose Replacement - Follow Nurse / BPA Driven Protocol    nitroglycerin    ondansetron    Pharmacy to Dose enoxaparin (LOVENOX)    Phosphorus Replacement - Follow Nurse / BPA Driven Protocol    Potassium Replacement - Follow Nurse / BPA Driven Protocol    sodium chloride    sodium chloride    sodium chloride    sodium chloride    Pharmacy to Dose enoxaparin (LOVENOX),   sodium chloride, 50 mL/hr, Last Rate: Stopped (24)           Objective     Vital Signs:  Temp (24hrs), Av.7 °F (36.5 °C), Min:97.4 °F (36.3 °C), Max:98.3 °F (36.8 °C)      /86 (BP Location: Right arm, Patient Position: Lying)   Pulse 73   Temp 97.5 °F (36.4 °C) (Oral)   Resp 18   Ht 182.9 cm (72\")   Wt 132 kg (291 lb 6.4 oz)   SpO2 96%   BMI 39.52 kg/m²         Physical Exam:    General: Patient is an obese male lying in bed in no acute distress  Respiratory: Effort even and unlabored, he was not conversationally dyspneic  Left foot dressing in place with sock over it.  Sock was partially removed.  Packing is in place involving the left heel.  Lateral Betadine soaked dressing in place as well.  No fluctuance or induration appreciated in the left lateral portion of the foot      Results Review:    I reviewed the patient's new clinical results.    Lab Results:    CBC:   Lab Results   Lab 24  1849 24  0419 24  0558 24  0605 24  0615 24  0444   WBC 15.48* 12.83* 9.76 5.75 5.73 5.89   HEMOGLOBIN 9.8* 9.0* 10.0* 10.3* 9.5* 9.5*   HEMATOCRIT 29.6* 28.3* 31.8* 33.2* 30.5* 30.1*   PLATELETS 201 176 219 215 201 210        AutoDiff:   Lab Results   Lab 24  0605 24  0615 24  0444   NEUTROPHIL % 57.9 60.7 64.0   LYMPHOCYTE % 20.9 20.1 19.9   MONOCYTES % 8.7 9.6 9.2   EOSINOPHIL % 11.1* 7.9* 4.8   BASOPHIL % 0.9 0.7 0.7   NEUTROS ABS 3.33 3.48 3.78 "   LYMPHS ABS 1.20 1.15 1.17   MONOS ABS 0.50 0.55 0.54   EOS ABS 0.64* 0.45* 0.28   BASOS ABS 0.05 0.04 0.04        Manual Diff:    Lab Results   Lab 08/03/24  0605 08/04/24  0615 08/05/24  0444   NEUTROS ABS 3.33 3.48 3.78           CMP:   Lab Results   Lab 08/03/24  0605 08/04/24  0615 08/05/24 0444 08/06/24  0508   SODIUM 143 145 144 143   POTASSIUM 3.8 3.9 3.9 4.1   CHLORIDE 110* 111* 112* 109*   CO2 19.0* 21.0* 21.0* 22.0   BUN 31* 24* 20 18   CREATININE 1.82* 1.60* 1.47* 1.47*   CALCIUM 8.3* 8.5* 8.3* 8.4*   BILIRUBIN 0.3 0.3 0.3  --    ALK PHOS 88 95 102  --    ALT (SGPT) 7 9 9  --    AST (SGOT) 12 15 16  --    GLUCOSE 125* 97 89 101*       Estimated Creatinine Clearance: 67.6 mL/min (A) (by C-G formula based on SCr of 1.47 mg/dL (H)).    Culture Results:    Microbiology Results (last 10 days)       Procedure Component Value - Date/Time    Wound Culture - Drainage, Foot, Left [[id number]]  (Abnormal) Collected: 08/02/24 1338    Lab Status: Final result Specimen: Drainage from Foot, Left Updated: 08/04/24 0857     Wound Culture Light growth (2+) Streptococcus agalactiae (Group B)     Comment:   This organism is considered to be universally susceptible to penicillin.  No further antibiotic testing will be performed. If Clindamycin or Erythromycin is the drug of choice, notify the laboratory within 7 days to request susceptibility testing.        Gram Stain Rare (1+) WBCs seen      No organisms seen    Wound Culture - Wound, Foot, Left [[id number]]  (Abnormal) Collected: 08/02/24 1032    Lab Status: Final result Specimen: Wound from Foot, Left Updated: 08/04/24 0857     Wound Culture Light growth (2+) Streptococcus agalactiae (Group B)     Comment:   This organism is considered to be universally susceptible to penicillin.  No further antibiotic testing will be performed. If Clindamycin or Erythromycin is the drug of choice, notify the laboratory within 7 days to request susceptibility testing.        Gram Stain  Few (2+) WBCs seen      Rare (1+) Gram positive cocci    Eosinophil Smear - Urine, Urine, Clean Catch [[id number]]  (Normal) Collected: 08/01/24 1547    Lab Status: Final result Specimen: Urine, Clean Catch Updated: 08/02/24 0149     Eosinophil Smear 0 % EOS/100 Cells     MRSA Screen, PCR (Inpatient) - Swab, Nares [[id number]]  (Normal) Collected: 08/01/24 0206    Lab Status: Final result Specimen: Swab from Nares Updated: 08/01/24 0346     MRSA PCR No MRSA Detected    Narrative:      The negative predictive value of this diagnostic test is high and should only be used to consider de-escalating anti-MRSA therapy. A positive result may indicate colonization with MRSA and must be correlated clinically.    Urine Culture - Urine, Straight Cath [[id number]]  (Abnormal)  (Susceptibility) Collected: 07/31/24 1851    Lab Status: Final result Specimen: Urine from Straight Cath Updated: 08/04/24 1129     Urine Culture >100,000 CFU/mL Escherichia coli ESBL    Narrative:      Colonization of the urinary tract without infection is common. Treatment is discouraged unless the patient is symptomatic, pregnant, or undergoing an invasive urologic procedure.  Recent outcomes data supports the use of pip/tazo in the treatment of susceptible ESBL infections for uncomplicated UTI. Consider use of pip/tazo as a carbapenem-sparing regimen in applicable patients.    Susceptibility        Escherichia coli ESBL      MATT      Amikacin Susceptible      Ertapenem Susceptible      Gentamicin Resistant      Levofloxacin Susceptible      Meropenem Susceptible      Nitrofurantoin Susceptible      Piperacillin + Tazobactam Susceptible      Tobramycin Resistant      Trimethoprim + Sulfamethoxazole Resistant                           Blood Culture - Blood, Hand, Left [[id number]]  (Normal) Collected: 07/31/24 1849    Lab Status: Final result Specimen: Blood from Hand, Left Updated: 08/05/24 1915     Blood Culture No growth at 5 days    Blood Culture -  Blood, Arm, Left [[id number]]  (Abnormal) Collected: 07/31/24 1849    Lab Status: Final result Specimen: Blood from Arm, Left Updated: 08/02/24 0545     Blood Culture Staphylococcus, coagulase negative     Isolated from Aerobic Bottle     Gram Stain Aerobic Bottle Gram positive cocci in clusters    Narrative:      Probable contaminant requires clinical correlation, susceptibility not performed unless requested by physician.      Blood Culture ID, PCR - Blood, Arm, Left [[id number]]  (Abnormal) Collected: 07/31/24 1849    Lab Status: Final result Specimen: Blood from Arm, Left Updated: 08/01/24 1117     BCID, PCR Staph spp, not aureus or lugdunensis. Identification by BCID2 PCR.     BOTTLE TYPE Aerobic Bottle                 Radiology:   Imaging Results (Last 72 Hours)       ** No results found for the last 72 hours. **                Active Hospital Problems    Diagnosis     **DKA, type 2, not at goal     E. coli UTI, ESBL     Atrial fibrillation     Chronic heart failure with preserved ejection fraction (HFpEF)     Chronic diastolic heart failure     GERD without esophagitis     Diabetic ulcer of left foot associated with type 2 diabetes mellitus        IMPRESSION:  Diabetic foot infection-cultures on 2 separate occasions positive for group B streptococcus including debrided cultures obtained by SUSAN Hoffmann.  Urinary tract infection with ESBL E. coli-this was documented as a straight cath specimen.  Patient did complain of some frequency.  History of osteomyelitis with MRSA status posttreatment with vancomycin and transition to linezolid.    Uncontrolled diabetes mellitus with hemoglobin A1c greater than 12.    Chronic kidney disease stage IIIb with acute kidney injury.  Creatinine continues to improve.  Positive blood cultures for coagulase-negative staph-contaminant    RECOMMENDATION:   Continue ertapenem (today is day #3) to cover both ESBL in urine as well as group B streptococcus-will likely treat  approximately 5 days for the urinary tract infection and assess need for ongoing therapy with p.o. for the left foot.  Dressing changes per podiatry.   Diabetes management per attending  Noted recommendations for skilled nursing facility for better wound care in this patient    Discussed with Dr. Barnett.  Agree patient not able to take care of himself at home        Julia Adrian MD  08/06/24  08:42 CDT        Electronically signed by Julia Adrian MD at 08/06/24 1035       Adelaida Vines APRN at 08/05/24 1311       Attestation signed by Asad Cerrato DPM at 08/05/24 1330    I have reviewed this documentation and agree.                      [facility] - PODIATRY    Today's Date: 08/05/24     Patient Name: Erick Luong  MRN: [id number]  CSN: [id number]  PCP: Del Shetty MD  Referring Provider: Abebe Garzon DO  Attending Provider: Alejandrina Barnett DO  Length of Stay: 5    SUBJECTIVE   Chief Complaint: Left foot wounds    HPI: Erick Luong, a 69 y.o.male, who is being followed by podiatry for left foot wounds.  Patient denies any significant events over the weekend.  Patient reports his dressings have been changed by nursing over the weekend.  He reports pain of 8/10 to his left foot.  Denies any constitutional symptoms. No other pedal complaints at this time.    Past Medical History:   Diagnosis Date    Arthritis     Autonomic disease     CAD (coronary artery disease) 02/06/2017    Cervical radiculopathy 09/16/2021    Chronic constipation with acute exaccerbation 05/10/2021    Coronary artery disease     Degeneration of cervical intervertebral disc 08/11/2021    Diabetes mellitus     Diabetic foot ulcer 08/31/2020    Diabetic polyneuropathy associated with type 2 diabetes mellitus 01/18/2021    Elevated cholesterol     Gastroesophageal reflux disease 05/13/2019    Headache     HTN (hypertension), benign 05/03/2017    Hyperlipidemia     Hypertension     Mixed  hyperlipidemia 02/07/2017    Multiple lung nodules 01/26/2020    5mm, 9 mm RLL identified 1/2020, not present 10/2019.    Myocardial infarction     Osteomyelitis 01/22/2020    Osteomyelitis of fifth toe of right foot 10/07/2019    Pancreatitis     Persistent insomnia 01/20/2020    Renal disorder     Sleep apnea 02/06/2017    Sleep apnea with use of continuous positive airway pressure (CPAP)     NON-COMPLIANT    Slow transit constipation 01/16/2019    Spinal stenosis in cervical region 09/16/2021    Vitamin D deficiency 03/02/2021     Past Surgical History:   Procedure Laterality Date    ABDOMINAL SURGERY      AMPUTATION FOOT / TOE Left 10/2021    5th digit     ANTERIOR CERVICAL DISCECTOMY W/ FUSION N/A 08/05/2022    Procedure: CERVICAL DISCECTOMY ANTERIOR WITH FUSION C5-6 with possible plating of C5-7 with neuromonitoring and 1 c-arm;  Surgeon: Karel Soliz MD;  Location: [facility] OR;  Service: Neurosurgery;  Laterality: N/A;    APPENDECTOMY      BACK SURGERY      CARDIAC CATHETERIZATION Left 02/08/2021    Procedure: Left Heart Cath w poss intervention left anatomical snuff box acess;  Surgeon: Omakr Charles DO;  Location: [facility] CATH INVASIVE LOCATION;  Service: Cardiology;  Laterality: Left;    CARDIAC SURGERY      CATARACT EXTRACTION      CERVICAL SPINE SURGERY      COLONOSCOPY N/A 01/31/2017    Normal exam repeat in 5 years    COLONOSCOPY N/A 02/11/2019    Mild acute inflammation    COLONOSCOPY N/A 04/07/2024    2 areas at 10 and 20 cm with friability ulceration 2 clips placed at 20 cm and 4 clips at 10 cm poor prep normal mucosa,mild eroisions and ulcerations in visible vessels    COLONOSCOPY N/A 7/1/2024    Procedure: COLONOSCOPY WITH ANESTHESIA;  Surgeon: Arsalan Lorenzo DO;  Location: [facility] ENDOSCOPY;  Service: Gastroenterology;  Laterality: N/A;  pre op constipation/diarrhea  post poor prep  pcp Del Shetty    COLONOSCOPY N/A 7/2/2024    Procedure: COLONOSCOPY WITH ANESTHESIA;   "Surgeon: Agapito Christopher MD;  Location: [facility] ENDOSCOPY;  Service: Gastroenterology;  Laterality: N/A;  pre rectal bleeding  post poor prep  pcp Del Shetty MD    COLONOSCOPY W/ POLYPECTOMY  2014    Hyperplastic polyp    CORONARY ARTERY BYPASS GRAFT  10/2015    ENDOSCOPY  2011    Gastritis with hemorrhage    ENDOSCOPY N/A 2017    Normal exam    ENDOSCOPY N/A 2019    Gastritis    ENDOSCOPY N/A 2020    Non-erosive gastritis with hemorrhage    ENDOSCOPY N/A 02/10/2021    Esophagitis    ENDOSCOPY N/A 2024    There were esophageal mucosal changes suspicious for short-segment Low's esophagus present in the distal esophagus. The maximum longitudinal extent of these mucosal changes was 2 cm in length. Mucosa was biopsied with a cold forceps for histologyDistal esophagus, biopsies: Mild chronic active esophagogastritis. No evidence of intestinal metaplasia, dysplasia. Antrum, bx, Mild chronic gastritis    FOOT SURGERY Left     INCISION AND DRAINAGE OF WOUND Left 2007    spider bite     Family History   Problem Relation Age of Onset    Colon cancer Father     Heart disease Father     Colon cancer Sister     Colon polyps Sister     Alzheimer's disease Mother     Coronary artery disease Sister     Coronary artery disease Sister      Social History     Socioeconomic History    Marital status:    Tobacco Use    Smoking status: Former     Current packs/day: 0.00     Types: Cigarettes     Quit date:      Years since quittin.6    Smokeless tobacco: Never    Tobacco comments:     smoked in highschool   Vaping Use    Vaping status: Never Used   Substance and Sexual Activity    Alcohol use: No    Drug use: No    Sexual activity: Defer     Allergies   Allergen Reactions    Cefepime Hives and Anaphylaxis    Bactrim [Sulfamethoxazole-Trimethoprim] Other (See Comments)     \"RENAL FAILURE\"    Vancomycin Itching    Zolpidem Mental Status Change     \"makes him crazy\"    " Metronidazole Rash     Current Facility-Administered Medications   Medication Dose Route Frequency Provider Last Rate Last Admin    ascorbic acid (VITAMIN C) tablet 1,000 mg  1,000 mg Oral Daily Reuben Garza APRN   1,000 mg at 08/05/24 0834    sennosides-docusate (PERICOLACE) 8.6-50 MG per tablet 2 tablet  2 tablet Oral BID Lamine Figueroa APRN   2 tablet at 08/04/24 0819    And    polyethylene glycol (MIRALAX) packet 17 g  17 g Oral Daily PRN Lamine Figueroa APRN        And    bisacodyl (DULCOLAX) EC tablet 5 mg  5 mg Oral Daily PRN Lamine Figueroa APRN        And    bisacodyl (DULCOLAX) suppository 10 mg  10 mg Rectal Daily PRN Lamine Figueroa APRN   10 mg at 08/02/24 0941    Calcium Replacement - Follow Nurse / BPA Driven Protocol   Does not apply PRN Abebe Garzon DO        dextrose (D50W) (25 g/50 mL) IV injection 10-50 mL  10-50 mL Intravenous Q15 Min PRN Abebe Garzon DO        dextrose (D50W) (25 g/50 mL) IV injection 25 g  25 g Intravenous Q15 Min PRN Lamine Figueroa APRN        dextrose (GLUTOSE) oral gel 15 g  15 g Oral Q15 Min PRN Lamine Figueroa APRN        donepezil (ARICEPT) tablet 10 mg  10 mg Oral Daily Reuben Garza APRN   10 mg at 08/05/24 0834    DULoxetine (CYMBALTA) DR capsule 60 mg  60 mg Oral Daily Reuben Garza APRN   60 mg at 08/05/24 0834    Enoxaparin Sodium (LOVENOX) syringe 135 mg  1 mg/kg Subcutaneous Q12H Abebe Garzon DO   135 mg at 08/05/24 0833    ertapenem (INVanz) 1,000 mg in sodium chloride 0.9 % 100 mL MBP  1,000 mg Intravenous Q24H Julia Adrian  mL/hr at 08/04/24 1357 1,000 mg at 08/04/24 1357    famotidine (PEPCID) tablet 20 mg  20 mg Oral Q12H Reuben Garza APRN   20 mg at 08/05/24 0833    ferric gluconate (FERRLECIT) 250 mg in sodium chloride 0.9 % 250 mL IVPB  250 mg Intravenous Daily Brian Lomas  mL/hr at 08/05/24 0833 250 mg at 08/05/24 0833    glucagon (GLUCAGEN) injection 1 mg  1 mg Intramuscular Q15 Min  PRN Lamine Figueroa APRN        insulin glargine (LANTUS, SEMGLEE) injection 10 Units  10 Units Subcutaneous Daily Keren Jamison MD   10 Units at 08/04/24 0823    insulin regular (humuLIN R,novoLIN R) injection 3-14 Units  3-14 Units Subcutaneous 4x Daily AC & at Bedtime Keren Jamison MD   3 Units at 08/03/24 2033    Magnesium Standard Dose Replacement - Follow Nurse / BPA Driven Protocol   Does not apply PRN Abebe Garzon DO        melatonin tablet 2.5 mg  2.5 mg Oral Nightly Reuben Garza APRN   2.5 mg at 08/04/24 2237    nitroglycerin (NITROSTAT) SL tablet 0.4 mg  0.4 mg Sublingual Q5 Min PRN Lamine Figueroa APRN        ondansetron (ZOFRAN) injection 4 mg  4 mg Intravenous Q6H PRN Payam Keyes DO   4 mg at 08/05/24 0800    oxyCODONE (ROXICODONE) immediate release tablet 10 mg  10 mg Oral BID Lamine Figueroa APRN   10 mg at 08/05/24 0834    pantoprazole (PROTONIX) EC tablet 40 mg  40 mg Oral Q12H Reuben Garza APRN   40 mg at 08/05/24 0833    Pharmacy to Dose enoxaparin (LOVENOX)   Does not apply Continuous PRN Abebe Garzon DO        Phosphorus Replacement - Follow Nurse / BPA Driven Protocol   Does not apply PRN Abebe Garzon DO        polyethylene glycol (MIRALAX) packet 17 g  17 g Oral Daily Reuben Garza APRN   17 g at 08/04/24 0819    Potassium Replacement - Follow Nurse / BPA Driven Protocol   Does not apply PRN Abebe Garzon DO        pregabalin (LYRICA) capsule 100 mg  100 mg Oral Nightly Reuben Garza APRN   100 mg at 08/04/24 2237    pregabalin (LYRICA) capsule 50 mg  50 mg Oral Daily Reuben Garza APRN   50 mg at 08/05/24 0834    rosuvastatin (CRESTOR) tablet 10 mg  10 mg Oral Nightly Reuben Garza APRN   10 mg at 08/04/24 2237    sodium bicarbonate tablet 650 mg  650 mg Oral TID Brian Lomas MD   650 mg at 08/05/24 0834    sodium chloride 0.9 % flush 10 mL  10 mL Intravenous Q12H Abebe Garzon DO   10 mL at 08/05/24 0841    sodium  chloride 0.9 % flush 10 mL  10 mL Intravenous PRN Abebe Garzon, DO        sodium chloride 0.9 % flush 10 mL  10 mL Intravenous Q12H Lamine Figueroa APRN   10 mL at 08/05/24 0841    sodium chloride 0.9 % flush 10 mL  10 mL Intravenous PRN Lamine Figueroa APRN        sodium chloride 0.9 % infusion 40 mL  40 mL Intravenous PRN Abebe Garzon,         sodium chloride 0.9 % infusion 40 mL  40 mL Intravenous PRN Lamine Figueroa APRN        sodium chloride 0.9 % infusion  50 mL/hr Intravenous Continuous Brian Lomas MD   Stopped at 08/04/24 2235    sodium hypochlorite (DAKIN'S 1/4 STRENGTH) 0.125 % topical solution 0.125% solution   Topical Q12H Aby High APRN   Given at 08/03/24 2034     Review of Systems   Constitutional:  Negative for activity change.   HENT:  Negative for congestion.    Respiratory:  Negative for apnea and shortness of breath.    Gastrointestinal:  Negative for abdominal pain.   Musculoskeletal:  Negative for arthralgias and back pain.        Foot pain   Skin:  Positive for wound.   Neurological:  Positive for numbness.   Psychiatric/Behavioral:  Positive for decreased concentration. Negative for agitation, behavioral problems and confusion.        OBJECTIVE     Vitals:    08/05/24 1159   BP: 155/76   Pulse: 69   Resp: 16   Temp: 97.4 °F (36.3 °C)   SpO2: 97%       PHYSICAL EXAM  GEN:   Pt presents in hospital bed. Accompanied by none.     Foot/Ankle Exam    GENERAL  Appearance:  chronically ill  Affect:  unfocused  Right shoe gear: sock  Left shoe gear: sock    VASCULAR     Right Foot Vascularity   Dorsalis pedis:  2+  Posterior tibial:  2+  Skin temperature:  warm  Edema grading:  Trace  CFT:  3  Pedal hair growth:  Absent     Left Foot Vascularity   Dorsalis pedis:  2+  Posterior tibial:  2+  Skin temperature:  warm  Edema grading:  Trace  CFT:  3  Pedal hair growth:  Absent     NEUROLOGIC     Right Foot Neurologic   Light touch sensation: diminished  Vibratory sensation:  diminished  Hot/Cold sensation: diminished     Left Foot Neurologic   Light touch sensation: diminished  Vibratory sensation: diminished  Hot/Cold sensation:  diminished    MUSCULOSKELETAL     Right Foot Musculoskeletal   Ecchymosis:  none  Tenderness:  none       Left Foot Musculoskeletal   Ecchymosis:  none  Tenderness:  lateral foot tenderness    MUSCLE STRENGTH     Right Foot Muscle Strength   Foot dorsiflexion:  5  Foot plantar flexion:  5  Foot inversion:  5  Foot eversion:  5     Left Foot Muscle Strength   Foot dorsiflexion:  4+  Foot plantar flexion:  4+  Foot inversion:  4+  Foot eversion:  4+    DERMATOLOGIC      Right Foot Dermatologic   Skin  Right foot skin is intact.      Left Foot Dermatologic   Skin  Positive for drainage, erythema and ulcer.      Left foot additional comments: Multiple areas of ulceration with serosanguineous drainage, erythema, and slight tenderness present. No malodor noticed.    See attached photos.               RADIOLOGY/NUCLEAR:  US Renal Bilateral    Result Date: 8/1/2024  Narrative: US RENAL BILATERAL- 8/1/2024 11:19 AM  HISTORY: Acute kidney injury; E11.628-Type 2 diabetes mellitus with other skin complications; L08.9-Local infection of the skin and subcutaneous tissue, unspecified; M86.9-Osteomyelitis, unspecified; E11.10-Type 2 diabetes mellitus with ketoacidosis without coma; R79.89-Other specified abnormal findings of blood chemistry; I48.91-Unspecified atrial fibrillation; R41.0-Disorientation, unspecified  COMPARISON: None available.   TECHNIQUE: Multiple longitudinal and transverse real-time sonographic images of the kidneys and urinary bladder are obtained. Report and images stored per institutional and state regulations.    FINDINGS:  Visualized proximal abdominal aorta and IVC are unremarkable.   RIGHT KIDNEY: Right kidney is 10.7 cm in length. Renal cortex is unremarkable. There is no hydronephrosis. There is an exophytic anechoic simple right renal cyst  measuring 4.9 cm..  LEFT KIDNEY: Left kidney is 11.3 cm in length. Renal cortex is unremarkable. There is no left hydronephrosis.  PELVIS: Urinary bladder is unremarkable.       Impression:  1. Simple right renal cyst. 2. Otherwise unremarkable kidneys and no hydronephrosis.    This report was signed and finalized on 8/1/2024 1:02 PM by Eran Christina.      CT Abdomen Pelvis With Contrast    Result Date: 8/1/2024  Narrative: EXAMINATION: CT ABDOMEN PELVIS W CONTRAST-   7/31/2024 10:41 PM  HISTORY: abdominal distention with pain; M86.9-Osteomyelitis, unspecified; E11.10-Type 2 diabetes mellitus with ketoacidosis without coma; R79.89-Other specified abnormal findings of blood chemistry; I48.91-Unspecified atrial fibrillation; R41.0-Disorientation, unspecified  In order to have a CT radiation dose as low as reasonably achievable Automated Exposure Control was utilized for adjustment of the mA and/or KV according to patient size.  Total DLP = 1841.72 mGy.cm  The CT scan of the abdomen and pelvis is performed after intravenous contrast enhancement.  The images are acquired in axial plane and subsequent reconstruction in coronal and sagittal planes.  Comparison is made with the previous study dated 6/27/2024.  The lung bases included in the study show a trace right and small left basal pleural effusion. There are mild atelectatic changes at bilateral bases left more than the right.  Limited visualized cardiomediastinal structures show atheromatous changes of the coronary arteries. There is moderate cardiomegaly.  The liver and spleen are normal.  The gallbladder is surgically absent.  Fatty infiltrated pancreas seen. No focal abnormality. No ductal dilatation. The adrenal glands are normal.  There is persistent bilateral significant perinephric fat infiltration and thickening of the pararenal fascia which is similar to the previous study. Bilateral nephrogram is normal and symmetrical. No calculi. No hydronephrosis.  There is a well-defined sharply marginated low density exophytic mass from the lower pole of the right kidney measuring 4.4 cm in diameter. CT density suggest a cyst. Limited visualized ureters are normal and nondilated. The urinary bladder is well distended. No intrinsic abnormality.  Prostate is not significantly enlarged.  There are small fat-containing inguinal hernias, right larger than the left.  There is subcutaneous fat infiltration of the entire abdominal wall extending into the lower extremity. This may represent a fluid overload?.  The stomach is decompressed with moderate wall thickening. No focal abnormality [place]. Duodenum is normal. Small bowel is nondistended and nondilated. Appendix is surgically absent. There is significant large volume stool throughout the colon. No finding to suggest obstruction.  Atheromatous changes of the abdominal aorta and iliac arteries. No aneurysmal dilatation.  Moderately prominent nonspecific retroperitoneal para-aortic lymph nodes predominantly in the mid abdomen. A referenced left para iliac lymph node, image #57 in series 2 and image #40 and series 3, measures 1.6 cm in short axis. There is a large lymph node in the left lower anterior pelvis/external iliac group measuring 1.3 cm in short axis. There are moderately prominent inguinal lymph nodes. A left inguinal lymph node measures 2 cm in short axis.  Images reviewed in bone window show chronic degenerative changes of the lumbar spine. No acute bony abnormality.      Impression: 1. A significant perinephric fat stranding is similar to the previous study and is a nonspecific finding. Possibility for chronic inflammatory process/chronic pyelonephritis may not be excluded. Renal functions are normal and symmetrical. 2. Fatty infiltration of the pancreas. No mass. No ductal dilatation. 3. Nonspecific abdominal, pelvic and inguinal lymphadenopathy. The etiology and clinical significance is not certain. This appears  moderately more progressive since the previous study. 4. Diffuse subcutaneous fat infiltration of the abdominal wall extending into the lower extremity may represent fluid overload?. 5. A significant large volume of stool in the colon without evidence of obstruction. This may represent constipation.  The above study was initially reviewed and reported by [facility]. I do not find any discrepancies.           This report was signed and finalized on 8/1/2024 7:50 AM by Dr. Carlos Cutler MD.      CT Head Without Contrast    Result Date: 8/1/2024  Narrative: EXAMINATION: CT HEAD WO CONTRAST-   7/31/2024 10:41 PM  HISTORY: altered mental status; M86.9-Osteomyelitis, unspecified; E11.10-Type 2 diabetes mellitus with ketoacidosis without coma; R79.89-Other specified abnormal findings of blood chemistry; I48.91-Unspecified atrial fibrillation; R41.0-Disorientation, unspecified  In order to have a CT radiation dose as low as reasonably achievable Automated Exposure Control was utilized for adjustment of the mA and/or KV according to patient size.  Total DLP = 783.72 mGy.cm  The CT scan of the head is performed without intravenous contrast enhancement.  The images are acquired in axial plane and subsequent reconstruction with coronal and sagittal planes.  Comparison is made with the previous study dated 5/3/2024.  There is no evidence of a mass. There is no midline shift.  There is no evidence of intracranial hemorrhage or hematoma.  Moderately dilated ventricles, basal cisterns and the cortical sulci are similar to the previous study representing chronic volume loss.  Areas of chronic white matter ischemia bilaterally are noted. The gray-white matter differentiation is maintained.  Posterior fossa structures are normal.  The images reviewed in bone window show no acute displaced skull fracture. A subtle nondisplaced fracture or lesion may be obscured due to motion artifacts. Large mucous retention cyst is seen in the right  maxillary antrum. The remaining paranasal sinuses and mastoid air cells are clear.      Impression: 1. No acute intracranial abnormality. 2. Chronic ischemic and atrophic changes. 3. Chronic maxillary sinusitis.  The above study was initially reviewed and reported by [facility]. I do not find any discrepancies.             This report was signed and finalized on 8/1/2024 5:27 AM by Dr. Carlos Cutler MD.      XR Foot 3+ View Left    Result Date: 7/31/2024  Narrative: EXAMINATION:  XR FOOT 3+ VW LEFT-  7/31/2024 6:22 PM  HISTORY: Heel wound.  COMPARISON: 3/26/2024.  TECHNIQUE: 3 views were obtained.  FINDINGS: There is a deep ulcer on the sole of the foot posteriorly. The ulcer appears to be packed with some type of radiopaque material. On the lateral image, there may be a small area of bony erosion involving the calcaneus just posterior to the plantar calcaneal spur. A small area of osteomyelitis is not ruled out. There has been prior amputation of the fifth toe and distal fifth metatarsal. There may have been prior resection of the distal fourth metacarpal and a portion of the proximal phalanx of the fourth toe versus chronic erosive change. The appearance is stable. There is severe narrowing of the first MTP joint. There is narrowing of some of the interphalangeal joints. There is some spurring in the tarsal region and at the tarsal-metatarsal junction. There is soft tissue swelling of the foot diffusely. There is a small curvilinear foreign body in the soft tissues along the sole of the foot in the second toe region in the proximal phalanx area. There is another small linear foreign body in the soft tissues adjacent to the distal phalanx of the great toe.       Impression: 1. Deep ulcer on the sole of the foot posteriorly near the calcaneus. There is a questionable small area of bony erosion along the plantar surface of the calcaneus just proximal to the plantar calcaneal spur. Osteomyelitis cannot be ruled out.  The soft tissue ulcer is packed with some type of radiopaque material. 2. Linear foreign bodies projected over the soft tissues of the second toe and first toe. Artifacts are also included in the differential. 3. Other chronic changes, as discussed.   This report was signed and finalized on 7/31/2024 7:47 PM by Dr. Raz Mendes MD.      XR Chest 1 View    Result Date: 7/31/2024  Narrative: EXAMINATION:  XR CHEST 1 VW-  7/31/2024 6:22 PM  HISTORY: Altered mental status. Hypertension and diabetes.  COMPARISON: 6/28/2024.  TECHNIQUE: Single view AP image.  FINDINGS: There is hypoventilation with vascular crowding. There is mild bronchial wall thickening, stable. There is no dense infiltrate or effusion. Heart size is borderline. Prior heart bypass surgery. Prior cervical fusion. No definite acute bony abnormality.       Impression: 1. Hypoventilation with vascular crowding. 2. Mild bronchial wall thickening, stable.    This report was signed and finalized on 7/31/2024 7:41 PM by Dr. Raz Mendes MD.       LABORATORY/CULTURE RESULTS:  Results from last 7 days   Lab Units 08/05/24  0444 08/04/24  0615 08/03/24  0605   WBC 10*3/mm3 5.89 5.73 5.75   HEMOGLOBIN g/dL 9.5* 9.5* 10.3*   HEMATOCRIT % 30.1* 30.5* 33.2*   PLATELETS 10*3/mm3 210 201 215     Results from last 7 days   Lab Units 08/05/24  0444 08/04/24  0615 08/03/24  0605   SODIUM mmol/L 144 145 143   POTASSIUM mmol/L 3.9 3.9 3.8   CHLORIDE mmol/L 112* 111* 110*   CO2 mmol/L 21.0* 21.0* 19.0*   BUN mg/dL 20 24* 31*   CREATININE mg/dL 1.47* 1.60* 1.82*   CALCIUM mg/dL 8.3* 8.5* 8.3*   BILIRUBIN mg/dL 0.3 0.3 0.3   ALK PHOS U/L 102 95 88   ALT (SGPT) U/L 9 9 7   AST (SGOT) U/L 16 15 12   GLUCOSE mg/dL 89 97 125*         Microbiology Results (last 10 days)       Procedure Component Value - Date/Time    Wound Culture - Drainage, Foot, Left [[id number]]  (Abnormal) Collected: 08/02/24 5603    Lab Status: Final result Specimen: Drainage from Foot, Left Updated:  08/04/24 0857     Wound Culture Light growth (2+) Streptococcus agalactiae (Group B)     Comment:   This organism is considered to be universally susceptible to penicillin.  No further antibiotic testing will be performed. If Clindamycin or Erythromycin is the drug of choice, notify the laboratory within 7 days to request susceptibility testing.        Gram Stain Rare (1+) WBCs seen      No organisms seen    Wound Culture - Wound, Foot, Left [[id number]]  (Abnormal) Collected: 08/02/24 1032    Lab Status: Final result Specimen: Wound from Foot, Left Updated: 08/04/24 0857     Wound Culture Light growth (2+) Streptococcus agalactiae (Group B)     Comment:   This organism is considered to be universally susceptible to penicillin.  No further antibiotic testing will be performed. If Clindamycin or Erythromycin is the drug of choice, notify the laboratory within 7 days to request susceptibility testing.        Gram Stain Few (2+) WBCs seen      Rare (1+) Gram positive cocci    Eosinophil Smear - Urine, Urine, Clean Catch [[id number]]  (Normal) Collected: 08/01/24 1547    Lab Status: Final result Specimen: Urine, Clean Catch Updated: 08/02/24 0149     Eosinophil Smear 0 % EOS/100 Cells     MRSA Screen, PCR (Inpatient) - Swab, Nares [[id number]]  (Normal) Collected: 08/01/24 0206    Lab Status: Final result Specimen: Swab from Nares Updated: 08/01/24 0346     MRSA PCR No MRSA Detected    Narrative:      The negative predictive value of this diagnostic test is high and should only be used to consider de-escalating anti-MRSA therapy. A positive result may indicate colonization with MRSA and must be correlated clinically.    Urine Culture - Urine, Straight Cath [[id number]]  (Abnormal)  (Susceptibility) Collected: 07/31/24 1851    Lab Status: Final result Specimen: Urine from Straight Cath Updated: 08/04/24 1129     Urine Culture >100,000 CFU/mL Escherichia coli ESBL    Narrative:      Colonization of the urinary tract without  infection is common. Treatment is discouraged unless the patient is symptomatic, pregnant, or undergoing an invasive urologic procedure.  Recent outcomes data supports the use of pip/tazo in the treatment of susceptible ESBL infections for uncomplicated UTI. Consider use of pip/tazo as a carbapenem-sparing regimen in applicable patients.    Susceptibility        Escherichia coli ESBL      MATT      Amikacin Susceptible      Ertapenem Susceptible      Gentamicin Resistant      Levofloxacin Susceptible      Meropenem Susceptible      Nitrofurantoin Susceptible      Piperacillin + Tazobactam Susceptible      Tobramycin Resistant      Trimethoprim + Sulfamethoxazole Resistant                           Blood Culture - Blood, Hand, Left [[id number]]  (Normal) Collected: 07/31/24 1849    Lab Status: Preliminary result Specimen: Blood from Hand, Left Updated: 08/04/24 1916     Blood Culture No growth at 4 days    Blood Culture - Blood, Arm, Left [[id number]]  (Abnormal) Collected: 07/31/24 1849    Lab Status: Final result Specimen: Blood from Arm, Left Updated: 08/02/24 0545     Blood Culture Staphylococcus, coagulase negative     Isolated from Aerobic Bottle     Gram Stain Aerobic Bottle Gram positive cocci in clusters    Narrative:      Probable contaminant requires clinical correlation, susceptibility not performed unless requested by physician.      Blood Culture ID, PCR - Blood, Arm, Left [[id number]]  (Abnormal) Collected: 07/31/24 1849    Lab Status: Final result Specimen: Blood from Arm, Left Updated: 08/01/24 1117     BCID, PCR Staph spp, not aureus or lugdunensis. Identification by BCID2 PCR.     BOTTLE TYPE Aerobic Bottle            PATHOLOGY RESULTS:       ASSESSMENT/PLAN   Diabetic foot ulcerations to the left foot  Type 2 diabetes mellitus with hyperglycemia  Diabetic polyneuropathy    Review of labs, imaging, and other provider documentation  Comprehensive lower extremity examination and evaluation was  performed.    Linear foreign bodies projected over the soft tissues of the second  toe and first toe were noted upon xray imaging (artifacts were also included in the differential). No evidence of soft tissue damage, FB entry, or infection noted to either great or second toes today.      Discussed with patient findings of wound drainage culture.    Continue wound care orders- Betadine wet-to-dry dressing to be performed twice daily per nursing.     No surgical interventions are planned at this time from a podiatric standpoint.     Due to difficulty caring for foot wounds at home, our recommendation would be for the patient to go to a skilled nursing facility for ongoing wound care upon discharge.       Thank you kindly for the consult, will continue to follow patient while in house.         This document has been electronically signed by SUSAN Perez on August 5, 2024 13:11 CDT            Electronically signed by Asad Cerrato DPM at 08/05/24 8144

## 2024-08-06 NOTE — PLAN OF CARE
Goal Outcome Evaluation: Patient medicated with scheduled pain medication. Patient stated that he didn't feel like working with therapy today. He states that he is not feeling well this afternoon. IV iron infusion and antibiotics given as ordered this shift. Patient safety to be maintained this shift,continue to monitor and report abnormal to provider.

## 2024-08-07 LAB
ANION GAP SERPL CALCULATED.3IONS-SCNC: 14 MMOL/L (ref 5–15)
BUN SERPL-MCNC: 15 MG/DL (ref 8–23)
BUN/CREAT SERPL: 10 (ref 7–25)
CALCIUM SPEC-SCNC: 8.6 MG/DL (ref 8.6–10.5)
CHLORIDE SERPL-SCNC: 110 MMOL/L (ref 98–107)
CO2 SERPL-SCNC: 20 MMOL/L (ref 22–29)
CREAT SERPL-MCNC: 1.5 MG/DL (ref 0.76–1.27)
EGFRCR SERPLBLD CKD-EPI 2021: 50.4 ML/MIN/1.73
GLUCOSE BLDC GLUCOMTR-MCNC: 103 MG/DL (ref 70–130)
GLUCOSE BLDC GLUCOMTR-MCNC: 114 MG/DL (ref 70–130)
GLUCOSE BLDC GLUCOMTR-MCNC: 137 MG/DL (ref 70–130)
GLUCOSE BLDC GLUCOMTR-MCNC: 98 MG/DL (ref 70–130)
GLUCOSE SERPL-MCNC: 97 MG/DL (ref 65–99)
POTASSIUM SERPL-SCNC: 3.9 MMOL/L (ref 3.5–5.2)
SODIUM SERPL-SCNC: 144 MMOL/L (ref 136–145)

## 2024-08-07 PROCEDURE — 82948 REAGENT STRIP/BLOOD GLUCOSE: CPT

## 2024-08-07 PROCEDURE — 97116 GAIT TRAINING THERAPY: CPT

## 2024-08-07 PROCEDURE — 25010000002 NA FERRIC GLUC CPLX PER 12.5 MG: Performed by: INTERNAL MEDICINE

## 2024-08-07 PROCEDURE — 25010000002 ONDANSETRON PER 1 MG: Performed by: FAMILY MEDICINE

## 2024-08-07 PROCEDURE — 80048 BASIC METABOLIC PNL TOTAL CA: CPT | Performed by: INTERNAL MEDICINE

## 2024-08-07 PROCEDURE — 63710000001 INSULIN GLARGINE PER 5 UNITS: Performed by: INTERNAL MEDICINE

## 2024-08-07 PROCEDURE — 25810000003 SODIUM CHLORIDE 0.9 % SOLUTION 250 ML FLEX CONT: Performed by: INTERNAL MEDICINE

## 2024-08-07 PROCEDURE — 25010000002 ENOXAPARIN PER 10 MG: Performed by: INTERNAL MEDICINE

## 2024-08-07 PROCEDURE — 99232 SBSQ HOSP IP/OBS MODERATE 35: CPT | Performed by: INTERNAL MEDICINE

## 2024-08-07 PROCEDURE — 25010000002 ERTAPENEM PER 500 MG: Performed by: INTERNAL MEDICINE

## 2024-08-07 RX ADMIN — SODIUM BICARBONATE 650 MG: 650 TABLET ORAL at 15:19

## 2024-08-07 RX ADMIN — ENOXAPARIN SODIUM 135 MG: 150 INJECTION SUBCUTANEOUS at 22:32

## 2024-08-07 RX ADMIN — ROSUVASTATIN CALCIUM 10 MG: 10 TABLET, FILM COATED ORAL at 22:31

## 2024-08-07 RX ADMIN — SODIUM CHLORIDE 250 MG: 9 INJECTION, SOLUTION INTRAVENOUS at 12:36

## 2024-08-07 RX ADMIN — PREGABALIN 50 MG: 25 CAPSULE ORAL at 11:08

## 2024-08-07 RX ADMIN — ONDANSETRON 4 MG: 2 INJECTION INTRAMUSCULAR; INTRAVENOUS at 22:29

## 2024-08-07 RX ADMIN — DOCUSATE SODIUM 50 MG AND SENNOSIDES 8.6 MG 2 TABLET: 8.6; 5 TABLET, FILM COATED ORAL at 22:29

## 2024-08-07 RX ADMIN — ERTAPENEM 1000 MG: 1 INJECTION INTRAMUSCULAR; INTRAVENOUS at 15:18

## 2024-08-07 RX ADMIN — FAMOTIDINE 20 MG: 20 TABLET ORAL at 11:09

## 2024-08-07 RX ADMIN — Medication 10 ML: at 22:32

## 2024-08-07 RX ADMIN — INSULIN GLARGINE 10 UNITS: 100 INJECTION, SOLUTION SUBCUTANEOUS at 12:36

## 2024-08-07 RX ADMIN — POLYETHYLENE GLYCOL 3350 17 G: 17 POWDER, FOR SOLUTION ORAL at 11:09

## 2024-08-07 RX ADMIN — PANTOPRAZOLE SODIUM 40 MG: 40 TABLET, DELAYED RELEASE ORAL at 22:30

## 2024-08-07 RX ADMIN — Medication 10 ML: at 22:30

## 2024-08-07 RX ADMIN — FAMOTIDINE 20 MG: 20 TABLET ORAL at 22:30

## 2024-08-07 RX ADMIN — OXYCODONE HYDROCHLORIDE 10 MG: 5 TABLET ORAL at 22:29

## 2024-08-07 RX ADMIN — SODIUM BICARBONATE 650 MG: 650 TABLET ORAL at 22:31

## 2024-08-07 RX ADMIN — ENOXAPARIN SODIUM 135 MG: 150 INJECTION SUBCUTANEOUS at 11:09

## 2024-08-07 RX ADMIN — ONDANSETRON 4 MG: 2 INJECTION INTRAMUSCULAR; INTRAVENOUS at 15:17

## 2024-08-07 RX ADMIN — PREGABALIN 100 MG: 100 CAPSULE ORAL at 22:29

## 2024-08-07 RX ADMIN — SODIUM BICARBONATE 650 MG: 650 TABLET ORAL at 11:07

## 2024-08-07 RX ADMIN — OXYCODONE HYDROCHLORIDE 10 MG: 5 TABLET ORAL at 11:08

## 2024-08-07 RX ADMIN — DOCUSATE SODIUM 50 MG AND SENNOSIDES 8.6 MG 2 TABLET: 8.6; 5 TABLET, FILM COATED ORAL at 11:08

## 2024-08-07 RX ADMIN — Medication 2.5 MG: at 22:30

## 2024-08-07 RX ADMIN — OXYCODONE HYDROCHLORIDE AND ACETAMINOPHEN 1000 MG: 500 TABLET ORAL at 11:07

## 2024-08-07 RX ADMIN — DULOXETINE HYDROCHLORIDE 60 MG: 30 CAPSULE, DELAYED RELEASE ORAL at 11:06

## 2024-08-07 RX ADMIN — DONEPEZIL HYDROCHLORIDE 10 MG: 10 TABLET, FILM COATED ORAL at 11:07

## 2024-08-07 RX ADMIN — PANTOPRAZOLE SODIUM 40 MG: 40 TABLET, DELAYED RELEASE ORAL at 11:09

## 2024-08-07 NOTE — CASE MANAGEMENT/SOCIAL WORK
Continued Stay Note   [place]     Patient Name: Erick Luong  MRN: [id number]  Today's Date: 8/7/2024    Admit Date: 7/31/2024    Plan: Home   Discharge Plan       Row Name 08/07/24 0917       Plan    Plan Home    Plan Comments LTAC has denied admit.  Patient has refused SNF placement and HH.  Will need to notify Venessa Hinton with [phone] on patient's dc.                   Discharge Codes    No documentation.                 Expected Discharge Date and Time       Expected Discharge Date Expected Discharge Time    Aug 7, 2024               SUZIE Hernandez

## 2024-08-07 NOTE — PROGRESS NOTES
Nephrology ([facility]) Progress Note      Patient:  Erick Luong  YOB: 1955  Date of Service: 8/7/2024  MRN: [id number]   Acct: [id number]   Primary Care Physician: Del Shetty MD  Advance Directive:   Code Status and Medical Interventions: CPR (Attempt to Resuscitate); Full Support   Ordered at: 08/01/24 0053     Level Of Support Discussed With:    Patient     Code Status (Patient has no pulse and is not breathing):    CPR (Attempt to Resuscitate)     Medical Interventions (Patient has pulse or is breathing):    Full Support     Admit Date: 7/31/2024       Hospital Day: 7  Referring Provider: Abebe Garzon DO      Patient personally seen and examined.  Complete chart including Consults, Notes, Operative Reports, Labs, Cardiology, and Radiology studies reviewed as able.        Subjective:  Erick Luong is a 69 y.o. male for whom we were consulted for evaluation and treatment of acute kidney injury. Baseline chronic kidney disease stage 3b. Baseline creatinine approximately 1.5-1.8. Follows in our office.  History of poorly controlled type 2 diabetes, hypertension, coronary artery disease, diabetic foot ulcer, obesity.  On 7/31 patient was found wandering at home, confused. Brought to ER for evaluation. Has a nonhealing left foot diabetic ulcer with serosanguineous drainage. Blood glucose level was >700 with A1c >12%. Initial creatinine was 2.67 and has steadily improved since he was admitted. Nephrology consulted on 8/01. Hospital course remarkable for improving renal function and improved blood glucose levels. Moved to medical floor    Today is awake and alert. No new complaints or new overnight issues. Urine output nonoliguric    Allergies:  Cefepime, Bactrim [sulfamethoxazole-trimethoprim], Vancomycin, Zolpidem, and Metronidazole    Home Meds:  Medications Prior to Admission   Medication Sig Dispense Refill Last Dose    ascorbic acid (VITAMIN C) 1000 MG  tablet Take 1 tablet by mouth Daily. 30 tablet 3     bumetanide (BUMEX) 1 MG tablet Take 1 tablet by mouth 2 (Two) Times a Day for 30 days. 60 tablet 0     busPIRone (BUSPAR) 10 MG tablet Take 1 tablet by mouth 2 (Two) Times a Day.       calcitriol (ROCALTROL) 0.5 MCG capsule Take 1 capsule by mouth Daily. 90 capsule 4     carvedilol (COREG) 3.125 MG tablet Take 1 tablet twice a day by oral route. 60 tablet 4     donepezil (ARICEPT) 10 MG tablet Take 1 tablet by mouth Daily. 90 tablet 1     DULoxetine (CYMBALTA) 60 MG capsule Take 1 capsule by mouth Daily. 90 capsule 4     famotidine (PEPCID) 20 MG tablet Take 1 tablet twice a day by oral route. 180 tablet 4     Insulin Glargine (Lantus SoloStar) 100 UNIT/ML injection pen Inject 20 Units under the skin into the appropriate area as directed every night at bedtime. 15 mL 3     Insulin Regular Human, Conc, (HumuLIN R) 500 UNIT/ML solution pen-injector CONCENTRATED injection Inject 40 Units under the skin into the appropriate area as directed 2 (Two) Times a Day Before Meals. Inject 40 units under the skin in the the appropriate area with regular meals AND 40 units with large meals.       Iron-Vitamin C (Vitron-C)  MG tablet Take 1 tablet twice a day by oral route. 60 tablet 2     levocetirizine (XYZAL) 5 MG tablet Take 1 tablet by mouth every day at bedtime. 30 tablet 2     melatonin 3 MG tablet Take 2 tablets by mouth At Night As Needed for Sleep. 30 tablet 0     oxyCODONE (ROXICODONE) 10 MG tablet Take 1 tablet by mouth 2 (Two) Times a Day As Needed. Must last 30 days per md. 55 tablet 0     pantoprazole (PROTONIX) 40 MG EC tablet Take 1 tablet by mouth Every 12 (Twelve) Hours. 180 tablet 4     pregabalin (LYRICA) 100 MG capsule Take 1 capsule by mouth Daily.       pregabalin (LYRICA) 100 MG capsule Take 2 capsules by mouth every night at bedtime.       rosuvastatin (CRESTOR) 10 MG tablet Take 1 tablet by mouth Every Night. 30 tablet 2     sucralfate (CARAFATE)  1 g tablet Take 1 tablet by mouth 4 (Four) Times a Day before meals. 360 tablet 1     Diclofenac Sodium (VOLTAREN) 1 % gel gel Apply 2 g topically to the appropriate area as directed 4 (Four) Times a Day As Needed. 300 g 11     nitroglycerin (NITROSTAT) 0.4 MG SL tablet Place 1 tablet under the tongue Every 5 (Five) Minutes As Needed for Chest Pain. Take no more than 3 doses in 15 minutes.       polyethylene glycol (MIRALAX) 17 GM/SCOOP powder Take 17 g by mouth Daily As Needed (constipation).       sennosides-docusate (PERICOLACE) 8.6-50 MG per tablet Take 1 tablet by mouth Every Night. Obtain OTC          Medicines:  Current Facility-Administered Medications   Medication Dose Route Frequency Provider Last Rate Last Admin    ascorbic acid (VITAMIN C) tablet 1,000 mg  1,000 mg Oral Daily Reuben Garza APRN   1,000 mg at 08/06/24 0958    sennosides-docusate (PERICOLACE) 8.6-50 MG per tablet 2 tablet  2 tablet Oral BID Lamine Figueroa APRN   2 tablet at 08/06/24 2050    And    polyethylene glycol (MIRALAX) packet 17 g  17 g Oral Daily PRN Lamine Figueroa APRN        And    bisacodyl (DULCOLAX) EC tablet 5 mg  5 mg Oral Daily PRN Lamine Figueroa APRN        And    bisacodyl (DULCOLAX) suppository 10 mg  10 mg Rectal Daily PRN Lamine Figueroa APRN   10 mg at 08/02/24 0941    Calcium Replacement - Follow Nurse / BPA Driven Protocol   Does not apply PRN Abebe Garzon DO        dextrose (D50W) (25 g/50 mL) IV injection 10-50 mL  10-50 mL Intravenous Q15 Min PRN Abebe Garzon DO        dextrose (D50W) (25 g/50 mL) IV injection 25 g  25 g Intravenous Q15 Min PRN Lamine Figueroa APRN        dextrose (GLUTOSE) oral gel 15 g  15 g Oral Q15 Min PRN Lamine Figueroa APRN        donepezil (ARICEPT) tablet 10 mg  10 mg Oral Daily Reuben Garza APRN   10 mg at 08/06/24 0958    DULoxetine (CYMBALTA) DR capsule 60 mg  60 mg Oral Daily Reuben Garza APRN   60 mg at 08/06/24 0958    Enoxaparin Sodium  (LOVENOX) syringe 135 mg  1 mg/kg Subcutaneous Q12H Abebe Garzon DO   135 mg at 08/06/24 2050    ertapenem (INVanz) 1,000 mg in sodium chloride 0.9 % 100 mL MBP  1,000 mg Intravenous Q24H Julia Adrian  mL/hr at 08/06/24 1516 1,000 mg at 08/06/24 1516    famotidine (PEPCID) tablet 20 mg  20 mg Oral Q12H Reuben Garza APRN   20 mg at 08/06/24 2050    ferric gluconate (FERRLECIT) 250 mg in sodium chloride 0.9 % 250 mL IVPB  250 mg Intravenous Daily Brian Lomas  mL/hr at 08/06/24 1213 250 mg at 08/06/24 1213    glucagon (GLUCAGEN) injection 1 mg  1 mg Intramuscular Q15 Min PRN Lamine Figueroa APRN        insulin glargine (LANTUS, SEMGLEE) injection 10 Units  10 Units Subcutaneous Daily Keren Jamison MD   10 Units at 08/06/24 0958    insulin regular (humuLIN R,novoLIN R) injection 3-14 Units  3-14 Units Subcutaneous 4x Daily AC & at Bedtime Keren Jamison MD   3 Units at 08/03/24 2033    Magnesium Standard Dose Replacement - Follow Nurse / BPA Driven Protocol   Does not apply PRN Abebe Garzon DO        melatonin tablet 2.5 mg  2.5 mg Oral Nightly Reuben Garza APRN   2.5 mg at 08/06/24 2050    nitroglycerin (NITROSTAT) SL tablet 0.4 mg  0.4 mg Sublingual Q5 Min PRN Lamine Figueroa APRN        ondansetron (ZOFRAN) injection 4 mg  4 mg Intravenous Q6H PRN Payam Keyes DO   4 mg at 08/05/24 0800    oxyCODONE (ROXICODONE) immediate release tablet 10 mg  10 mg Oral BID Alejandrina Barnett DO   10 mg at 08/06/24 2049    pantoprazole (PROTONIX) EC tablet 40 mg  40 mg Oral Q12H Reuben Garza APRN   40 mg at 08/06/24 2050    Pharmacy to Dose enoxaparin (LOVENOX)   Does not apply Continuous PRN Abebe Garzon DO        Phosphorus Replacement - Follow Nurse / BPA Driven Protocol   Does not apply PRN Abebe Garzon DO        polyethylene glycol (MIRALAX) packet 17 g  17 g Oral Daily Reuben Garza APRN   17 g at 08/06/24 0959    Potassium Replacement -  Follow Nurse / BPA Driven Protocol   Does not apply PRN Abebe Garzon DO        pregabalin (LYRICA) capsule 100 mg  100 mg Oral Nightly Reuben Garza APRN   100 mg at 08/06/24 2050    pregabalin (LYRICA) capsule 50 mg  50 mg Oral Daily Reuben Garza APRN   50 mg at 08/06/24 0958    rosuvastatin (CRESTOR) tablet 10 mg  10 mg Oral Nightly Reuben Garza APRN   10 mg at 08/06/24 2050    sodium bicarbonate tablet 650 mg  650 mg Oral TID Brian Lomas MD   650 mg at 08/06/24 2050    sodium chloride 0.9 % flush 10 mL  10 mL Intravenous Q12H Abebe Garzon DO   10 mL at 08/06/24 2050    sodium chloride 0.9 % flush 10 mL  10 mL Intravenous PRN Abebe Garzon DO        sodium chloride 0.9 % flush 10 mL  10 mL Intravenous Q12H Lamine Figueroa APRN   10 mL at 08/06/24 2050    sodium chloride 0.9 % flush 10 mL  10 mL Intravenous PRN Lamine Figueroa APRN        sodium chloride 0.9 % infusion 40 mL  40 mL Intravenous PRN Abebe Garzon DO        sodium chloride 0.9 % infusion 40 mL  40 mL Intravenous PRN Lamine Figueroa APRN        sodium hypochlorite (DAKIN'S 1/4 STRENGTH) 0.125 % topical solution 0.125% solution   Topical Q12H Aby High APRN   Given at 08/06/24 1000       Past Medical History:  Past Medical History:   Diagnosis Date    Arthritis     Autonomic disease     CAD (coronary artery disease) 02/06/2017    Cervical radiculopathy 09/16/2021    Chronic constipation with acute exaccerbation 05/10/2021    Coronary artery disease     Degeneration of cervical intervertebral disc 08/11/2021    Diabetes mellitus     Diabetic foot ulcer 08/31/2020    Diabetic polyneuropathy associated with type 2 diabetes mellitus 01/18/2021    Elevated cholesterol     Gastroesophageal reflux disease 05/13/2019    Headache     HTN (hypertension), benign 05/03/2017    Hyperlipidemia     Hypertension     Mixed hyperlipidemia 02/07/2017    Multiple lung nodules 01/26/2020    5mm, 9 mm RLL identified  1/2020, not present 10/2019.    Myocardial infarction     Osteomyelitis 01/22/2020    Osteomyelitis of fifth toe of right foot 10/07/2019    Pancreatitis     Persistent insomnia 01/20/2020    Renal disorder     Sleep apnea 02/06/2017    Sleep apnea with use of continuous positive airway pressure (CPAP)     NON-COMPLIANT    Slow transit constipation 01/16/2019    Spinal stenosis in cervical region 09/16/2021    Vitamin D deficiency 03/02/2021       Past Surgical History:  Past Surgical History:   Procedure Laterality Date    ABDOMINAL SURGERY      AMPUTATION FOOT / TOE Left 10/2021    5th digit     ANTERIOR CERVICAL DISCECTOMY W/ FUSION N/A 08/05/2022    Procedure: CERVICAL DISCECTOMY ANTERIOR WITH FUSION C5-6 with possible plating of C5-7 with neuromonitoring and 1 c-arm;  Surgeon: Karel Soliz MD;  Location: [facility] OR;  Service: Neurosurgery;  Laterality: N/A;    APPENDECTOMY      BACK SURGERY      CARDIAC CATHETERIZATION Left 02/08/2021    Procedure: Left Heart Cath w poss intervention left anatomical snuff box acess;  Surgeon: Omkar Charles DO;  Location: [facility] CATH INVASIVE LOCATION;  Service: Cardiology;  Laterality: Left;    CARDIAC SURGERY      CATARACT EXTRACTION      CERVICAL SPINE SURGERY      COLONOSCOPY N/A 01/31/2017    Normal exam repeat in 5 years    COLONOSCOPY N/A 02/11/2019    Mild acute inflammation    COLONOSCOPY N/A 04/07/2024    2 areas at 10 and 20 cm with friability ulceration 2 clips placed at 20 cm and 4 clips at 10 cm poor prep normal mucosa,mild eroisions and ulcerations in visible vessels    COLONOSCOPY N/A 7/1/2024    Procedure: COLONOSCOPY WITH ANESTHESIA;  Surgeon: Arsalan Lorenzo DO;  Location: [facility] ENDOSCOPY;  Service: Gastroenterology;  Laterality: N/A;  pre op constipation/diarrhea  post poor prep  pcp Del Shetty    COLONOSCOPY N/A 7/2/2024    Procedure: COLONOSCOPY WITH ANESTHESIA;  Surgeon: Agapito Christopher MD;  Location: [facility] ENDOSCOPY;  Service:  Gastroenterology;  Laterality: N/A;  pre rectal bleeding  post poor prep  pcp Del Shetty MD    COLONOSCOPY W/ POLYPECTOMY  2014    Hyperplastic polyp    CORONARY ARTERY BYPASS GRAFT  10/2015    ENDOSCOPY  2011    Gastritis with hemorrhage    ENDOSCOPY N/A 2017    Normal exam    ENDOSCOPY N/A 2019    Gastritis    ENDOSCOPY N/A 2020    Non-erosive gastritis with hemorrhage    ENDOSCOPY N/A 02/10/2021    Esophagitis    ENDOSCOPY N/A 2024    There were esophageal mucosal changes suspicious for short-segment Low's esophagus present in the distal esophagus. The maximum longitudinal extent of these mucosal changes was 2 cm in length. Mucosa was biopsied with a cold forceps for histologyDistal esophagus, biopsies: Mild chronic active esophagogastritis. No evidence of intestinal metaplasia, dysplasia. Antrum, bx, Mild chronic gastritis    FOOT SURGERY Left     INCISION AND DRAINAGE OF WOUND Left 2007    spider bite       Family History  Family History   Problem Relation Age of Onset    Colon cancer Father     Heart disease Father     Colon cancer Sister     Colon polyps Sister     Alzheimer's disease Mother     Coronary artery disease Sister     Coronary artery disease Sister        Social History  Social History     Socioeconomic History    Marital status:    Tobacco Use    Smoking status: Former     Current packs/day: 0.00     Types: Cigarettes     Quit date:      Years since quittin.6    Smokeless tobacco: Never    Tobacco comments:     [place]   Vaping Use    Vaping status: Never Used   Substance and Sexual Activity    Alcohol use: No    Drug use: No    Sexual activity: Defer       Review of Systems:  History obtained from chart review and the patient  General ROS: No fever or chills  Respiratory ROS: No cough, shortness of breath, wheezing  Cardiovascular ROS: No chest pain or palpitations  Gastrointestinal ROS: No abdominal pain or  melena  Genito-Urinary ROS: No dysuria or hematuria  Psych ROS: No anxiety and depression  14 point ROS reviewed with the patient and negative except as noted above and in the HPI unless unable to obtain.    Objective:  Patient Vitals for the past 24 hrs:   BP Temp Temp src Pulse Resp SpO2 Weight   08/07/24 0837 146/76 98.6 °F (37 °C) Oral 74 16 96 % --   08/07/24 0525 -- -- -- -- -- -- 133 kg (293 lb 3.2 oz)   08/07/24 0315 139/77 98.2 °F (36.8 °C) Oral 67 16 95 % --   08/06/24 1930 142/61 98.3 °F (36.8 °C) Oral 67 16 96 % --   08/06/24 1616 161/71 98.1 °F (36.7 °C) Oral 97 18 97 % --   08/06/24 1151 169/95 98.5 °F (36.9 °C) Oral 74 17 98 % --     No intake or output data in the 24 hours ending 08/07/24 1007    General: awake/alert   Chest:  clear to auscultation bilaterally without respiratory distress  CVS: regular rate and rhythm  Abdominal: soft, nontender, positive bowel sounds  Extremities: no cyanosis or edema  Skin:  left foot ulcer and warm and dry without rash      Labs:  Results from last 7 days   Lab Units 08/05/24  0444 08/04/24  0615 08/03/24  0605   WBC 10*3/mm3 5.89 5.73 5.75   HEMOGLOBIN g/dL 9.5* 9.5* 10.3*   HEMATOCRIT % 30.1* 30.5* 33.2*   PLATELETS 10*3/mm3 210 201 215         Results from last 7 days   Lab Units 08/07/24  0758 08/06/24  0508 08/05/24  0444 08/04/24  0615 08/03/24  0605   SODIUM mmol/L 144 143 144 145 143   POTASSIUM mmol/L 3.9 4.1 3.9 3.9 3.8   CHLORIDE mmol/L 110* 109* 112* 111* 110*   CO2 mmol/L 20.0* 22.0 21.0* 21.0* 19.0*   BUN mg/dL 15 18 20 24* 31*   CREATININE mg/dL 1.50* 1.47* 1.47* 1.60* 1.82*   CALCIUM mg/dL 8.6 8.4* 8.3* 8.5* 8.3*   EGFR mL/min/1.73 50.4* 51.6* 51.6* 46.6* 40.0*   BILIRUBIN mg/dL  --   --  0.3 0.3 0.3   ALK PHOS U/L  --   --  102 95 88   ALT (SGPT) U/L  --   --  9 9 7   AST (SGOT) U/L  --   --  16 15 12   GLUCOSE mg/dL 97 101* 89 97 125*       Radiology:   Imaging Results (Last 72 Hours)       ** No results found for the last 72 hours. **             Culture:  Blood Culture   Date Value Ref Range Status   07/31/2024 No growth at 24 hours  Preliminary   07/31/2024 Staphylococcus, coagulase negative (C)  Final         Assessment    Acute kidney injury, ATN--resolving  Baseline chronic kidney disease stage 3b  Type 2 diabetes  Hypertension  Sepsis related to diabetic foot ulcer  Anemia of CKD  Obesity     Plan:   Encourage compliance with medications and diabetic diet  Renal function stable   Monitor labs      Stewart Alvarez, APRN  8/7/2024  10:07 CDT

## 2024-08-07 NOTE — PROGRESS NOTES
[facility] Medicine Services  INPATIENT PROGRESS NOTE    Patient Name: Erick Luong  Date of Admission: 7/31/2024  Today's Date: 08/07/24  Length of Stay: 7  Primary Care Physician: Del Shetty MD    Subjective   Chief Complaint: diabetic foot ulcer.   HPI   Patient sitting up in chair.  He is without major complaint at this time.  Patient is not very conversant.  This is his usual demeanor.  He did not qualify for transfer to LTAC.    Patient is stable and is ready for transition to a lower level of care.  Skilled nursing facility would be appropriate.    Blood sugars while he has been in the hospital have remained controlled on approximately half of the amount of insulin he is to take at home.      Review of Systems   All pertinent negatives and positives are as above. All other systems have been reviewed and are negative unless otherwise stated.     Objective    Temp:  [98.1 °F (36.7 °C)-98.6 °F (37 °C)] 98.6 °F (37 °C)  Heart Rate:  [67-97] 74  Resp:  [16-18] 16  BP: (139-161)/(61-77) 146/76  Physical Exam  Vitals and nursing note reviewed.   Constitutional:       Appearance: He is obese.   HENT:      Head: Normocephalic and atraumatic.      Right Ear: External ear normal.      Left Ear: External ear normal.      Nose: Nose normal.      Mouth/Throat:      Mouth: Mucous membranes are moist.   Eyes:      Extraocular Movements: Extraocular movements intact.      Conjunctiva/sclera: Conjunctivae normal.      Pupils: Pupils are equal, round, and reactive to light.   Cardiovascular:      Rate and Rhythm: Normal rate and regular rhythm.      Pulses: Normal pulses.      Heart sounds: Normal heart sounds.   Pulmonary:      Effort: Pulmonary effort is normal.   Abdominal:      General: Bowel sounds are normal.      Palpations: Abdomen is soft.   Musculoskeletal:      Cervical back: Normal range of motion.      Comments: Moves all extremities   Skin:     General: Skin is warm and  dry.      Capillary Refill: Capillary refill takes less than 2 seconds.      Comments: Dressing left foot/heel intact.   Neurological:      Mental Status: He is alert and oriented to person, place, and time.   Psychiatric:      Comments: After ACT is flat.             Results Review:  I have reviewed the labs, radiology results, and diagnostic studies.    Laboratory Data:   Results from last 7 days   Lab Units 08/05/24  0444 08/04/24  0615 08/03/24  0605   WBC 10*3/mm3 5.89 5.73 5.75   HEMOGLOBIN g/dL 9.5* 9.5* 10.3*   HEMATOCRIT % 30.1* 30.5* 33.2*   PLATELETS 10*3/mm3 210 201 215        Results from last 7 days   Lab Units 08/07/24  0758 08/06/24  0508 08/05/24 0444 08/04/24  0615 08/03/24  0605   SODIUM mmol/L 144 143 144 145 143   POTASSIUM mmol/L 3.9 4.1 3.9 3.9 3.8   CHLORIDE mmol/L 110* 109* 112* 111* 110*   CO2 mmol/L 20.0* 22.0 21.0* 21.0* 19.0*   BUN mg/dL 15 18 20 24* 31*   CREATININE mg/dL 1.50* 1.47* 1.47* 1.60* 1.82*   CALCIUM mg/dL 8.6 8.4* 8.3* 8.5* 8.3*   BILIRUBIN mg/dL  --   --  0.3 0.3 0.3   ALK PHOS U/L  --   --  102 95 88   ALT (SGPT) U/L  --   --  9 9 7   AST (SGOT) U/L  --   --  16 15 12   GLUCOSE mg/dL 97 101* 89 97 125*       Culture Data:   Blood Culture   Date Value Ref Range Status   07/31/2024 No growth at 5 days  Final   07/31/2024 Staphylococcus, coagulase negative (C)  Final     Urine Culture   Date Value Ref Range Status   07/31/2024 >100,000 CFU/mL Escherichia coli ESBL (A)  Final     Wound Culture   Date Value Ref Range Status   08/02/2024 (A)  Final    Light growth (2+) Streptococcus agalactiae (Group B)     Comment:       This organism is considered to be universally susceptible to penicillin.  No further antibiotic testing will be performed. If Clindamycin or Erythromycin is the drug of choice, notify the laboratory within 7 days to request susceptibility testing.   08/02/2024 (A)  Final    Light growth (2+) Streptococcus agalactiae (Group B)     Comment:       This organism  is considered to be universally susceptible to penicillin.  No further antibiotic testing will be performed. If Clindamycin or Erythromycin is the drug of choice, notify the laboratory within 7 days to request susceptibility testing.       Radiology Data:   Imaging Results (Last 24 Hours)       ** No results found for the last 24 hours. **            I have reviewed the patient's current medications.     Assessment/Plan   Assessment  Active Hospital Problems    Diagnosis     **DKA, type 2, not at goal     E. coli UTI, ESBL     Atrial fibrillation     Chronic heart failure with preserved ejection fraction (HFpEF)     Chronic diastolic heart failure     GERD without esophagitis     Diabetic ulcer of left foot associated with type 2 diabetes mellitus        Treatment Plan  Discussed with the patient the need for continued care and treatment of the left foot wound.  Patient is unable to take care of himself effectively at home.  On admission apparently Adult Protective Services was contacted.  I have recommended to the patient again skilled nursing until such a time that the wound is healed.  We discussed that if he does not he will most likely lose the foot secondary to the wound with worsening of wound and infections.  Patient says he is willing to consider going to a nursing home.  I discussed this with  and they will discuss with him to see where he would like to go.       Medical Decision Making  Number and Complexity of problems:   3 acute, high complexity problems  4+ chronic, moderate complexity problems     Differential Diagnosis: None     Conditions and Status        Condition is improving.     [facility] Data  External documents reviewed: Care Everywhere  Cardiac tracing (EKG, telemetry) interpretation: See HPI  Radiology interpretation: See HPI  Labs reviewed: See HPI  Any tests that were considered but not ordered: None     Decision rules/scores evaluated (example [id number], [place], etc): None      Discussed with: The patient     Care Planning  Shared decision making: Patient  Code status and discussions: Full code     Disposition  Social Determinants of Health that impact treatment or disposition: none     I expect the patient to be discharged to SNF  in 1-2 days.    Electronically signed by Alejandrina Barnett DO, 08/07/24, 14:43 CDT.

## 2024-08-07 NOTE — CASE MANAGEMENT/SOCIAL WORK
Continued Stay Note  [facility]     Patient Name: Erick Luong  MRN: [id number]  Today's Date: 8/7/2024    Admit Date: 7/31/2024    Plan: SNF placement   Discharge Plan       Row Name 08/07/24 1156       Plan    Plan SNF placement    Plan Comments Patient has now agreed to SNF referrals to [facility], [facility].  Referrals made, awaiting response.      Row Name 08/07/24 0917       Plan    Plan Home    Plan Comments LTAC has denied admit.  Patient has refused SNF placement and HH.  Will need to notify Venessa Hinton with [phone] on patient's dc.                   Discharge Codes    No documentation.                 Expected Discharge Date and Time       Expected Discharge Date Expected Discharge Time    Aug 7, 2024               SUZIE Hernandez

## 2024-08-07 NOTE — DISCHARGE PLACEMENT REQUEST
"To: SNF      From: Lisa TSANG [phone]        Erick Luong (69 y.o. Male)       Date of Birth   1955    Social Security Number       Address   [address] [place] [address]    Home Phone       MRN   [id number]       [facility]    Marital Status                               Admission Date   7/31/24    Admission Type   Emergency    Admitting Provider   Alejandrina Barnett DO    Attending Provider   Alejandrina Barnett DO    Department, Room/Bed   [facility] 3C, 365/1       Discharge Date       Discharge Disposition       Discharge Destination                                 Attending Provider: Alejandrina Barnett DO    Allergies: Cefepime, Bactrim [Sulfamethoxazole-trimethoprim], Vancomycin, Zolpidem, Metronidazole    Isolation: Contact   Infection: MRSA (05/19/19), COVID (History) (08/08/22), ESBL E coli (06/30/24)   Code Status: CPR    Ht: 182.9 cm (72\")   Wt: 133 kg (293 lb 3.2 oz)    Admission Cmt: None   Principal Problem: DKA, type 2, not at goal [E11.10]                   Active Insurance as of 7/31/2024       Primary Coverage       Payor Plan Insurance Group Employer/Plan Group    [employer] EM[employer]OYEE [id number]       Payor Plan Address Payor Plan Phone Number Payor Plan Fax Number Effective Dates    [address] [phone]  1/1/2022 - None Entered    [facility] [id number]         Subscriber Name Subscriber Birth Date Member ID       ZAINAB LUONG 11/27/1969 [id number]               Secondary Coverage       Payor Plan Insurance Group Employer/Plan Group    MEDICARE MEDICARE A & B        Payor Plan Address Payor Plan Phone Number Payor Plan Fax Number Effective Dates    [address] [phone]  7/1/2013 - None Entered    [facility] [id number]         Subscriber Name Subscriber Birth Date Member ID       ERICK LUONG 1955 [id number]                     Emergency Contacts        (Rel.) Home Phone Work Phone " Mobile Phone    Joan Luong (Spouse) [phone]                 History & Physical        FigueroaLamine APRN at 07/31/24 2230       Attestation signed by Keren Jamison MD at 08/02/24 3302    I have reviewed this documentation and agree.    I have seen and examined the patient and reviewed the chart and the blood work and radiology and discussed the patient with the midlevel and the team and put the plan of the management and I agree with the above    I provided 75 minutes of total critical care time. Due to the high probability of clinically significant, life-threatening deterioration, the patient required my direct and personal management. The critical care time does not include time spent on separately billable procedures.                       [facility] Intensivist Services    Date of Admission: 7/31/2024  Date of Note: 07/31/24  Primary Care Physician: Del Shetty MD    History   Mr. Luong is a 69-year-old male who presented to [facility] ER via EMS for altered mental status.  It was reported to ER physician by EMS that the patient was was found by his son confused and wandering around in the garage.  Unfortunately family was not available for additional information.   HPI obtained from ER physician, Dr. Dee, who advises that patient presented soiled, confused and unable to provided events leading to his reason for EMS transport.     ER course workup significant for: CBC with elevated WBC of 15.4, low hemoglobin 9.8 and hematocrit 29.6, elevated ESR 57, elevated CRP of 17.  CMP with low sodium 133, elevated BUN of 54 and elevated creatinine 2.64, elevated glucose 704, urine ketones and small amount of acetone. Elevated lactate 2.4 with delta 2.2.  Elevated HS troponin T 342 delta 353.   Urinalysis: Trace blood, positive nitrite, negative leukocytes, +2 bacteria.   CXR: Low lung volumes and vascular crowding.  X-ray of left foot: Ulcer on the  sole of the foot posteriorly near the calcaneus.  There is questionable small area of bony erosion along the plantar surface of the calcaneus just proximal to the plantar calcaneal spur.  Osteomyelitis cannot be ruled out.   EKG: A-fib with rapid ventricular response with rate of 128 bpm.     The ICU team was asked to evaluate the patient for AMS, AFIB-RVR, DKA, and open wound to left foot concerning for possible osteomyelitis.     I have seen and evaluated patient upon his arrival to CCU 4 where he appears in no acute distress, breathing is equal and non-labored.  He is slow to provided answers and is intermittently confused. He is able to tell me his name, , and the year after many tries, he is unaware to reason for hospitalization or how he got here.   He currently has insulin, and Cardizem drip infusing. Reports generalized discomfort, worse throughout the abdomen with associated nausea and reports vomiting early today and being sick the last couple days, does not give further details. Abdomen is distended and soft, grossly tender. Denies diarrhea or bloody/dark stool. Reports chest discomfort and shortness of breath. Open stage 2-3 decubitus ulcer to the left heel with associated soft tissue swelling, redness and weeping.     Past Medical History     Active and Resolved Problems  Active Hospital Problems    Diagnosis  POA    **DKA, type 2, not at goal [E11.10]  Yes      Resolved Hospital Problems   No resolved problems to display.       Past Medical History:   Past Medical History:   Diagnosis Date    Arthritis     Autonomic disease     CAD (coronary artery disease) 2017    Cervical radiculopathy 2021    Chronic constipation with acute exaccerbation 05/10/2021    Coronary artery disease     Degeneration of cervical intervertebral disc 2021    Diabetes mellitus     Diabetic foot ulcer 2020    Diabetic polyneuropathy associated with type 2 diabetes mellitus 2021    Elevated  cholesterol     Gastroesophageal reflux disease 05/13/2019    Headache     HTN (hypertension), benign 05/03/2017    Hyperlipidemia     Hypertension     Mixed hyperlipidemia 02/07/2017    Multiple lung nodules 01/26/2020    5mm, 9 mm RLL identified 1/2020, not present 10/2019.    Myocardial infarction     Osteomyelitis 01/22/2020    Osteomyelitis of fifth toe of right foot 10/07/2019    Pancreatitis     Persistent insomnia 01/20/2020    Renal disorder     Sleep apnea 02/06/2017    Sleep apnea with use of continuous positive airway pressure (CPAP)     NON-COMPLIANT    Slow transit constipation 01/16/2019    Spinal stenosis in cervical region 09/16/2021    Vitamin D deficiency 03/02/2021       Prior Surgeries: He  has a past surgical history that includes Appendectomy; Abdominal surgery; Cataract extraction; Cardiac surgery; Incision and drainage of wound (Left, 09/2007); Colonoscopy w/ polypectomy (03/04/2014); Esophagogastroduodenoscopy (04/13/2011); Cervical spine surgery; Colonoscopy (N/A, 01/31/2017); Esophagogastroduodenoscopy (N/A, 05/05/2017); Coronary artery bypass graft (10/2015); Back surgery; Esophagogastroduodenoscopy (N/A, 02/11/2019); Colonoscopy (N/A, 02/11/2019); Esophagogastroduodenoscopy (N/A, 09/01/2020); Esophagogastroduodenoscopy (N/A, 02/10/2021); Cardiac catheterization (Left, 02/08/2021); Amputation foot / toe (Left, 10/2021); Foot surgery (Left); Anterior cervical discectomy w/ fusion (N/A, 08/05/2022); Colonoscopy (N/A, 04/07/2024); Esophagogastroduodenoscopy (N/A, 04/11/2024); Colonoscopy (N/A, 7/1/2024); and Colonoscopy (N/A, 7/2/2024).    Past Surgical History:   Past Surgical History:   Procedure Laterality Date    ABDOMINAL SURGERY      AMPUTATION FOOT / TOE Left 10/2021    5th digit     ANTERIOR CERVICAL DISCECTOMY W/ FUSION N/A 08/05/2022    Procedure: CERVICAL DISCECTOMY ANTERIOR WITH FUSION C5-6 with possible plating of C5-7 with neuromonitoring and 1 c-arm;  Surgeon: Karel Soliz  MD MARTINA;  Location: [facility] OR;  Service: Neurosurgery;  Laterality: N/A;    APPENDECTOMY      BACK SURGERY      CARDIAC CATHETERIZATION Left 02/08/2021    Procedure: Left Heart Cath w poss intervention left anatomical snuff box acess;  Surgeon: Omkar Charles DO;  Location: [facility] CATH INVASIVE LOCATION;  Service: Cardiology;  Laterality: Left;    CARDIAC SURGERY      CATARACT EXTRACTION      CERVICAL SPINE SURGERY      COLONOSCOPY N/A 01/31/2017    Normal exam repeat in 5 years    COLONOSCOPY N/A 02/11/2019    Mild acute inflammation    COLONOSCOPY N/A 04/07/2024    2 areas at 10 and 20 cm with friability ulceration 2 clips placed at 20 cm and 4 clips at 10 cm poor prep normal mucosa,mild eroisions and ulcerations in visible vessels    COLONOSCOPY N/A 7/1/2024    Procedure: COLONOSCOPY WITH ANESTHESIA;  Surgeon: Arsalan Lorenzo DO;  Location: [facility] ENDOSCOPY;  Service: Gastroenterology;  Laterality: N/A;  pre op constipation/diarrhea  post poor prep  pcp Del Shetty    COLONOSCOPY N/A 7/2/2024    Procedure: COLONOSCOPY WITH ANESTHESIA;  Surgeon: Agapito Christopher MD;  Location: [facility] ENDOSCOPY;  Service: Gastroenterology;  Laterality: N/A;  pre rectal bleeding  post poor prep  pcp Del Shetty MD    COLONOSCOPY W/ POLYPECTOMY  03/04/2014    Hyperplastic polyp    CORONARY ARTERY BYPASS GRAFT  10/2015    ENDOSCOPY  04/13/2011    Gastritis with hemorrhage    ENDOSCOPY N/A 05/05/2017    Normal exam    ENDOSCOPY N/A 02/11/2019    Gastritis    ENDOSCOPY N/A 09/01/2020    Non-erosive gastritis with hemorrhage    ENDOSCOPY N/A 02/10/2021    Esophagitis    ENDOSCOPY N/A 04/11/2024    There were esophageal mucosal changes suspicious for short-segment Low's esophagus present in the distal esophagus. The maximum longitudinal extent of these mucosal changes was 2 cm in length. Mucosa was biopsied with a cold forceps for histologyDistal esophagus, biopsies: Mild chronic active esophagogastritis. No  "evidence of intestinal metaplasia, dysplasia. Antrum, bx, Mild chronic gastritis    FOOT SURGERY Left     INCISION AND DRAINAGE OF WOUND Left 09/2007    spider bite       Social and Family History     Family History:  family history includes Alzheimer's disease in his mother; Colon cancer in his father and sister; Colon polyps in his sister; Coronary artery disease in his sister and sister; Heart disease in his father.    Tobacco/Social History:  reports that he quit smoking about 33 years ago. His smoking use included cigarettes. He has never used smokeless tobacco. He reports that he does not drink alcohol and does not use drugs.    Allergies     Allergies:   He is allergic to cefepime, bactrim [sulfamethoxazole-trimethoprim], vancomycin, zolpidem, and metronidazole.    Allergies   Allergen Reactions    Cefepime Hives and Anaphylaxis    Bactrim [Sulfamethoxazole-Trimethoprim] Other (See Comments)     \"RENAL FAILURE\"    Vancomycin Itching    Zolpidem Mental Status Change     \"makes him crazy\"    Metronidazole Rash       Labs     Basic Labs:  Common labs          7/3/2024    08:12 7/25/2024    10:18 7/31/2024    18:49 7/31/2024    20:30   Common Labs   Glucose 115  59  704  653    BUN 22  43  55  54    Creatinine 1.54  2.24  2.67  2.64    Sodium 143  144  132  133    Potassium 3.5  3.4  4.5  4.4    Chloride 104  106  97  98    Calcium 8.8  9.2  9.0  8.9    Albumin  3.8  3.5     Total Bilirubin  0.3  0.6     Alkaline Phosphatase  84  101     AST (SGOT)  11  11     ALT (SGPT)  7  8     WBC  8.92  15.48     Hemoglobin  11.2  9.8     Hematocrit  34.9  29.6     Platelets  223  201     Total Cholesterol  168      Triglycerides  69      HDL Cholesterol  57      LDL Cholesterol   98      Hemoglobin A1C  11.60  12.60     Microalbumin, Urine  73.7      PSA  0.403          Diabetic:  A1C Last 3 Results          5/3/2024    16:22 7/25/2024    10:18 7/31/2024    18:49   HGBA1C Last 3 Results   Hemoglobin A1C 11.20  11.60  12.60  "       Inpatient Medications     Medications: Scheduled Meds:enoxaparin, 1 mg/kg, Subcutaneous, Q12H  sodium chloride, 10 mL, Intravenous, Q12H  vancomycin, 2,500 mg, Intravenous, Once   Followed by  [START ON 8/1/2024] vancomycin, 750 mg, Intravenous, Q24H      Continuous Infusions:dextrose 5 % and sodium chloride 0.45 %, 150 mL/hr  dextrose 5 % and sodium chloride 0.45 % with KCl 20 mEq/L, 150 mL/hr  dextrose 5 % and sodium chloride 0.45 % with KCl 40 mEq/L, 150 mL/hr  dextrose 5 % and sodium chloride 0.9 %, 150 mL/hr  dextrose 5 % and sodium chloride 0.9 % with KCl 20 mEq, 150 mL/hr  dextrose 5% and sodium chloride 0.9% with KCl 40 mEq/L, 150 mL/hr  dilTIAZem, 5-15 mg/hr, Last Rate: 5 mg/hr (07/31/24 2031)  insulin, 0-100 Units/hr, Last Rate: 3.9 Units/hr (07/31/24 2239)  Pharmacy to Dose enoxaparin (LOVENOX),   sodium chloride 0.45 % 1,000 mL with potassium chloride 40 mEq infusion, 250 mL/hr  sodium chloride, 250 mL/hr  sodium chloride 0.45 % with KCl 20 mEq, 250 mL/hr  sodium chloride, 250 mL/hr  sodium chloride 0.9 % with KCl 20 mEq, 250 mL/hr  sodium chloride 0.9 % with KCl 40 mEq/L, 250 mL/hr      PRN Meds:.  Calcium Replacement - Follow Nurse / BPA Driven Protocol    dextrose    dextrose    dextrose 5 % and sodium chloride 0.45 %    dextrose 5 % and sodium chloride 0.45 % with KCl 20 mEq/L    dextrose 5 % and sodium chloride 0.45 % with KCl 40 mEq/L    dextrose 5 % and sodium chloride 0.9 %    dextrose 5 % and sodium chloride 0.9 % with KCl 20 mEq    dextrose 5% and sodium chloride 0.9% with KCl 40 mEq/L    glucagon (human recombinant)    Magnesium Standard Dose Replacement - Follow Nurse / BPA Driven Protocol    Pharmacy to Dose enoxaparin (LOVENOX)    Phosphorus Replacement - Follow Nurse / BPA Driven Protocol    Potassium Replacement - Follow Nurse / BPA Driven Protocol    sodium chloride 0.45 % 1,000 mL with potassium chloride 40 mEq infusion    sodium chloride    sodium chloride 0.45 % with KCl 20  "mEq    sodium chloride    sodium chloride    sodium chloride    sodium chloride 0.9 % with KCl 20 mEq    sodium chloride 0.9 % with KCl 40 mEq/L    I have reviewed the patient's current medications.   Outpatient Medications     Outpatient Medications:   No outpatient medications have been marked as taking for the 7/31/24 encounter (Hospital Encounter).       Current Antibiotics     vancomycin  vancomycin (VANCOCIN) IVPB in 500 mL (1750 mg or greater)    Exam     Vitals: His  height is 182.9 cm (72\") and weight is 132 kg (290 lb). His oral temperature is 99.5 °F (37.5 °C). His blood pressure is 100/53 and his pulse is 82. His respiration is 21 and oxygen saturation is 97%.     GENERAL: Confused, no acute distress.   SKIN:  Warm, dry  EYES:  Pupils equal, round and reactive to light.  EOMs intact.    HEAD:  Normocephalic.  NECK:  Supple   RESP:  Lungs clear to auscultation. Good airflow. Normal respiratory effort.   CARDIAC:  AFIB rate controlled 80 bpm, on cardizem drip currently. Normal S1,S2. No edema  GI: protuberant, distended, soft, tender throughout.  MSK:  Normal muscle bulk, tone  NEUROLOGICAL:  No focal neurological deficits. Follows commands  PSYCHIATRIC:  Normal affect and mood.  Alert to person and place, disoriented to time or reason for hospitalization or how he arrived to facility.     Results and Cultures Review     Result Review:  I have personally reviewed the results from the time of this admission to 7/31/2024 22:46 CDT and agree with these findings:  [x]  Laboratory list / accordion  [x]  Microbiology  []  Radiology  [x]  EKG/Telemetry   [x]  Cardiology/Vascular   []  Pathology  [x]  Old records  []  Other:  Most notable findings include:  see HPI      Culture Data:   No results found for: \"BLOODCX\", \"URINECX\", \"WOUNDCX\", \"MRSACX\", \"RESPCX\", \"STOOLCX\"    Assessment/Plan   This is a 69-year-old male who presented to [facility] ER via EMS for altered mental status.  PMHx DM type II, atrial fibrillation, " CAD, chronic poor healing diabetic left foot ulcer with osteomyelitis, diabetic polyneuropathy, hypertension, hyperlipidemia insomnia, CKD stage IV, BRITTNI-CPAP, cervical spine stenosis, and vitamin D deficiency with numerous hospitalizations this year. PT was found to be confused in  AFIB-RVR, DKA, and open wound to left heal concerning acute infection with osteomyelitis. Critical care team asked to further manage acute illness.     Patient will be admitted under intensivist MD, Dr. Jamison, case will be discussed in the AM.  Further recommendations and/or modifications to the treatment plan are ultimately at his discretion.    Treatment Plan    AFIB-RVR  -chronic history of atrial fibrillation. On review of PMHx records he was prescribed and taking Eliquis, which has been held due to GI bleeding after which he was suppose to f/u with cardiology about restarting. Epic shows cancelled f/u visits with cardiology, medication is currently not on patients med list.  -Lovenox AFIB coverage ordered in ED.  -Cardizem drip started in ED, rate controlled currently 80-90 bpm.   -likely rapid ventricular response 2/2 acute illness and probable underlying infection.  -Plan to DC Cardizem and transition back to his home dose BB  -elevated troponin in  delta 353, likely type II MI, cardiology consulted in ER, we appreciate their continued input.    ?Diabetic ketoacidosis  -History DM type 2 insulin-dependent  -small amount of acetone, ketones in urine  -do not have beta hydroxybutyrate at this facility  -BS at arrival 704  -normal anion gap  -normal pH 7.41  -Insulin drip started in ER will discontinue and transition to SSI  -likely HHS in the setting of acute illness    Urinary Tract Infection  -urinalysis shows nitrate positive, and +2 bacteria  -Hx of ESBL 6/30/24  -Urine culture pending  -Started on Zosyn/Vanco in the ER will continue  -ID consult    Diabetic foot ulcer  -chronic open wound, DC ulcer stage 2-3 to left heal  with larger area soft pale tissue with surrounding edema/erythema and weeping at the outer aspect of the foot along the area of the 5th metatarsal.   -Hx of osteomyelitis and related MRSA bacteremia   -Zosyn/Vanco started in ED will continue   -podiatry consult  -wound care consult    Altered mental status  -likely related to acute illness/metabolic encephalopathy.  -cannot r/u stroke. No CT head in ER. No focal neuro deficits  -CT head ordered-results pending     Abdominal pain  -reported nausea/vomiting. Abdominal distention and tender on exam  -Hx of pancreatitis  -previous appendectomy  -Hx of GI bleeding, no active bleeding noted  -No CT abd/pel in ER  -CT abd/pel with contrast ordered-results pending    CKD-IV  -bun/creatinine at baseline  -monitor lytes and urine output   -avoid neph toxic medications      VTE Prophylaxis:    Pharmacologic VTE prophylaxis orders are present.        Total critical care time: Approximately 65 minutes    Due to a high probability of clinically significant, life threatening deterioration, the patient required my highest level of preparedness to intervene emergently and I personally spent this critical care time directly and personally managing the patient.     This critical care time included obtaining a history; examining the patient; pulse oximetry; ordering and review of studies; arranging urgent treatment with development of a management plan; evaluation of patient's response to treatment; frequent reassessment; and, discussions with other providers.    This critical care time was performed to assess and manage the high probability of imminent, life-threatening deterioration that could result in multi-organ failure. It was exclusive of separately billable procedures and treating other patients and teaching time.    Please see MDM section and the rest of the note for further information on patient assessment and treatment.    Part of this note may be an electronic  transcription/translation of spoken language to printed text using the Dragon Dictation System.    Electronically signed by SUSAN Richter on 7/31/2024 at 22:46 CDT    Electronically signed by Keren Jamison MD at 08/02/24 1654       Current Facility-Administered Medications   Medication Dose Route Frequency Provider Last Rate Last Admin    ascorbic acid (VITAMIN C) tablet 1,000 mg  1,000 mg Oral Daily Reuben Garza APRN   1,000 mg at 08/07/24 1107    sennosides-docusate (PERICOLACE) 8.6-50 MG per tablet 2 tablet  2 tablet Oral BID Lamine Figueroa APRN   2 tablet at 08/07/24 1108    And    polyethylene glycol (MIRALAX) packet 17 g  17 g Oral Daily PRN Lamine Figueroa APRN        And    bisacodyl (DULCOLAX) EC tablet 5 mg  5 mg Oral Daily PRN Lamine Figueroa APRN        And    bisacodyl (DULCOLAX) suppository 10 mg  10 mg Rectal Daily PRN Lamine Figueroa APRN   10 mg at 08/02/24 0941    Calcium Replacement - Follow Nurse / BPA Driven Protocol   Does not apply PRN Abebe Garzon DO        dextrose (D50W) (25 g/50 mL) IV injection 10-50 mL  10-50 mL Intravenous Q15 Min PRN Abebe Garzon DO        dextrose (D50W) (25 g/50 mL) IV injection 25 g  25 g Intravenous Q15 Min PRN Lamine Figueroa APRN        dextrose (GLUTOSE) oral gel 15 g  15 g Oral Q15 Min PRN Lamine Figueroa APRN        donepezil (ARICEPT) tablet 10 mg  10 mg Oral Daily Reuben Garza APRN   10 mg at 08/07/24 1107    DULoxetine (CYMBALTA) DR capsule 60 mg  60 mg Oral Daily Reuben Garza APRN   60 mg at 08/07/24 1106    Enoxaparin Sodium (LOVENOX) syringe 135 mg  1 mg/kg Subcutaneous Q12H Abebe Garzon DO   135 mg at 08/07/24 1109    ertapenem (INVanz) 1,000 mg in sodium chloride 0.9 % 100 mL MBP  1,000 mg Intravenous Q24H Julia Adrian  mL/hr at 08/06/24 1516 1,000 mg at 08/06/24 1516    famotidine (PEPCID) tablet 20 mg  20 mg Oral Q12H Reuben Garza APRN   20 mg at 08/07/24 1109    ferric  gluconate (FERRLECIT) 250 mg in sodium chloride 0.9 % 250 mL IVPB  250 mg Intravenous Daily Brian Lomas  mL/hr at 08/06/24 1213 250 mg at 08/06/24 1213    glucagon (GLUCAGEN) injection 1 mg  1 mg Intramuscular Q15 Min PRN Lamine Figueroa APRN        insulin glargine (LANTUS, SEMGLEE) injection 10 Units  10 Units Subcutaneous Daily Keren Jamison MD   10 Units at 08/06/24 0958    insulin regular (humuLIN R,novoLIN R) injection 3-14 Units  3-14 Units Subcutaneous 4x Daily AC & at Bedtime Keren Jamison MD   3 Units at 08/03/24 2033    Magnesium Standard Dose Replacement - Follow Nurse / BPA Driven Protocol   Does not apply PRN Abebe Garzon DO        melatonin tablet 2.5 mg  2.5 mg Oral Nightly Reuben Garza APRN   2.5 mg at 08/06/24 2050    nitroglycerin (NITROSTAT) SL tablet 0.4 mg  0.4 mg Sublingual Q5 Min PRN Lamine Figueroa APRN        ondansetron (ZOFRAN) injection 4 mg  4 mg Intravenous Q6H PRN Payam Keyes DO   4 mg at 08/05/24 0800    oxyCODONE (ROXICODONE) immediate release tablet 10 mg  10 mg Oral BID Alejandrina Barnett DO   10 mg at 08/07/24 1108    pantoprazole (PROTONIX) EC tablet 40 mg  40 mg Oral Q12H Reuben Garza APRN   40 mg at 08/07/24 1109    Pharmacy to Dose enoxaparin (LOVENOX)   Does not apply Continuous PRN Aebbe Garzon DO        Phosphorus Replacement - Follow Nurse / BPA Driven Protocol   Does not apply PRN Abebe Garzon DO        polyethylene glycol (MIRALAX) packet 17 g  17 g Oral Daily Reuben Garza APRN   17 g at 08/07/24 1109    Potassium Replacement - Follow Nurse / BPA Driven Protocol   Does not apply PRN Abebe Garzon DO        pregabalin (LYRICA) capsule 100 mg  100 mg Oral Nightly Rueben Garza APRN   100 mg at 08/06/24 2050    pregabalin (LYRICA) capsule 50 mg  50 mg Oral Daily Reuben Garza APRN   50 mg at 08/07/24 1108    rosuvastatin (CRESTOR) tablet 10 mg  10 mg Oral Nightly Reuben Garza APRN   10 mg at  08/06/24 2050    sodium bicarbonate tablet 650 mg  650 mg Oral TID Brian Lomas MD   650 mg at 08/07/24 1107    sodium chloride 0.9 % flush 10 mL  10 mL Intravenous Q12H Abebe Garzon DO   10 mL at 08/06/24 2050    sodium chloride 0.9 % flush 10 mL  10 mL Intravenous PRN Abebe Garzon DO        sodium chloride 0.9 % flush 10 mL  10 mL Intravenous Q12H Lamine Figueroa APRN   10 mL at 08/06/24 2050    sodium chloride 0.9 % flush 10 mL  10 mL Intravenous PRN Lamine Figueroa APRN        sodium chloride 0.9 % infusion 40 mL  40 mL Intravenous PRN Abebe Garzon DO        sodium chloride 0.9 % infusion 40 mL  40 mL Intravenous PRN Lamine Figueroa APRN        sodium hypochlorite (DAKIN'S 1/4 STRENGTH) 0.125 % topical solution 0.125% solution   Topical Q12H Aby High APRN   Given at 08/06/24 1000     Operative/Procedure Notes (all)    No notes of this type exist for this encounter.          Physician Progress Notes (most recent note)        Stewart Alvarez APRN at 08/07/24 1007          Nephrology ([facility]) Progress Note      Patient:  Erick Luong  YOB: 1955  Date of Service: 8/7/2024  MRN: [id number]   Acct: [id number]   Primary Care Physician: Del Shetty MD  Advance Directive:   Code Status and Medical Interventions: CPR (Attempt to Resuscitate); Full Support   Ordered at: 08/01/24 0053     Level Of Support Discussed With:    Patient     Code Status (Patient has no pulse and is not breathing):    CPR (Attempt to Resuscitate)     Medical Interventions (Patient has pulse or is breathing):    Full Support     Admit Date: 7/31/2024       Hospital Day: 7  Referring Provider: Abebe Garzon DO      Patient personally seen and examined.  Complete chart including Consults, Notes, Operative Reports, Labs, Cardiology, and Radiology studies reviewed as able.        Subjective:  Erick Luong is a 69 y.o. male for whom we were consulted for  evaluation and treatment of acute kidney injury. Baseline chronic kidney disease stage 3b. Baseline creatinine approximately 1.5-1.8. Follows in our office.  History of poorly controlled type 2 diabetes, hypertension, coronary artery disease, diabetic foot ulcer, obesity.  On 7/31 patient was found wandering at home, confused. Brought to ER for evaluation. Has a nonhealing left foot diabetic ulcer with serosanguineous drainage. Blood glucose level was >700 with A1c >12%. Initial creatinine was 2.67 and has steadily improved since he was admitted. Nephrology consulted on 8/01. Hospital course remarkable for improving renal function and improved blood glucose levels. Moved to medical floor    Today is awake and alert. No new complaints or new overnight issues. Urine output nonoliguric    Allergies:  Cefepime, Bactrim [sulfamethoxazole-trimethoprim], Vancomycin, Zolpidem, and Metronidazole    Home Meds:  Medications Prior to Admission   Medication Sig Dispense Refill Last Dose    ascorbic acid (VITAMIN C) 1000 MG tablet Take 1 tablet by mouth Daily. 30 tablet 3     bumetanide (BUMEX) 1 MG tablet Take 1 tablet by mouth 2 (Two) Times a Day for 30 days. 60 tablet 0     busPIRone (BUSPAR) 10 MG tablet Take 1 tablet by mouth 2 (Two) Times a Day.       calcitriol (ROCALTROL) 0.5 MCG capsule Take 1 capsule by mouth Daily. 90 capsule 4     carvedilol (COREG) 3.125 MG tablet Take 1 tablet twice a day by oral route. 60 tablet 4     donepezil (ARICEPT) 10 MG tablet Take 1 tablet by mouth Daily. 90 tablet 1     DULoxetine (CYMBALTA) 60 MG capsule Take 1 capsule by mouth Daily. 90 capsule 4     famotidine (PEPCID) 20 MG tablet Take 1 tablet twice a day by oral route. 180 tablet 4     Insulin Glargine (Lantus SoloStar) 100 UNIT/ML injection pen Inject 20 Units under the skin into the appropriate area as directed every night at bedtime. 15 mL 3     Insulin Regular Human, Conc, (HumuLIN R) 500 UNIT/ML solution pen-injector  CONCENTRATED injection Inject 40 Units under the skin into the appropriate area as directed 2 (Two) Times a Day Before Meals. Inject 40 units under the skin in the the appropriate area with regular meals AND 40 units with large meals.       Iron-Vitamin C (Vitron-C)  MG tablet Take 1 tablet twice a day by oral route. 60 tablet 2     levocetirizine (XYZAL) 5 MG tablet Take 1 tablet by mouth every day at bedtime. 30 tablet 2     melatonin 3 MG tablet Take 2 tablets by mouth At Night As Needed for Sleep. 30 tablet 0     oxyCODONE (ROXICODONE) 10 MG tablet Take 1 tablet by mouth 2 (Two) Times a Day As Needed. Must last 30 days per md. 55 tablet 0     pantoprazole (PROTONIX) 40 MG EC tablet Take 1 tablet by mouth Every 12 (Twelve) Hours. 180 tablet 4     pregabalin (LYRICA) 100 MG capsule Take 1 capsule by mouth Daily.       pregabalin (LYRICA) 100 MG capsule Take 2 capsules by mouth every night at bedtime.       rosuvastatin (CRESTOR) 10 MG tablet Take 1 tablet by mouth Every Night. 30 tablet 2     sucralfate (CARAFATE) 1 g tablet Take 1 tablet by mouth 4 (Four) Times a Day before meals. 360 tablet 1     Diclofenac Sodium (VOLTAREN) 1 % gel gel Apply 2 g topically to the appropriate area as directed 4 (Four) Times a Day As Needed. 300 g 11     nitroglycerin (NITROSTAT) 0.4 MG SL tablet Place 1 tablet under the tongue Every 5 (Five) Minutes As Needed for Chest Pain. Take no more than 3 doses in 15 minutes.       polyethylene glycol (MIRALAX) 17 GM/SCOOP powder Take 17 g by mouth Daily As Needed (constipation).       sennosides-docusate (PERICOLACE) 8.6-50 MG per tablet Take 1 tablet by mouth Every Night. Obtain OTC          Medicines:  Current Facility-Administered Medications   Medication Dose Route Frequency Provider Last Rate Last Admin    ascorbic acid (VITAMIN C) tablet 1,000 mg  1,000 mg Oral Daily Reuben Garza APRN   1,000 mg at 08/06/24 0958    sennosides-docusate (PERICOLACE) 8.6-50 MG per tablet 2  tablet  2 tablet Oral BID Lamine Figueroa APRN   2 tablet at 08/06/24 2050    And    polyethylene glycol (MIRALAX) packet 17 g  17 g Oral Daily PRN Lamine Figueroa APRN        And    bisacodyl (DULCOLAX) EC tablet 5 mg  5 mg Oral Daily PRN Lamine Figueroa APRN        And    bisacodyl (DULCOLAX) suppository 10 mg  10 mg Rectal Daily PRN Laimne Figueroa APRN   10 mg at 08/02/24 0941    Calcium Replacement - Follow Nurse / BPA Driven Protocol   Does not apply PRN Abebe Garzon DO        dextrose (D50W) (25 g/50 mL) IV injection 10-50 mL  10-50 mL Intravenous Q15 Min PRN Abebe Garzon DO        dextrose (D50W) (25 g/50 mL) IV injection 25 g  25 g Intravenous Q15 Min PRN Lamine Figueroa APRN        dextrose (GLUTOSE) oral gel 15 g  15 g Oral Q15 Min PRN Lamine Figueroa APRN        donepezil (ARICEPT) tablet 10 mg  10 mg Oral Daily Reuben Garza APRN   10 mg at 08/06/24 0958    DULoxetine (CYMBALTA) DR capsule 60 mg  60 mg Oral Daily Reuben Garza APRN   60 mg at 08/06/24 0958    Enoxaparin Sodium (LOVENOX) syringe 135 mg  1 mg/kg Subcutaneous Q12H Abebe Garzon DO   135 mg at 08/06/24 2050    ertapenem (INVanz) 1,000 mg in sodium chloride 0.9 % 100 mL MBP  1,000 mg Intravenous Q24H Julia Adrian  mL/hr at 08/06/24 1516 1,000 mg at 08/06/24 1516    famotidine (PEPCID) tablet 20 mg  20 mg Oral Q12H Reuben Garza APRN   20 mg at 08/06/24 2050    ferric gluconate (FERRLECIT) 250 mg in sodium chloride 0.9 % 250 mL IVPB  250 mg Intravenous Daily Brian Lomas  mL/hr at 08/06/24 1213 250 mg at 08/06/24 1213    glucagon (GLUCAGEN) injection 1 mg  1 mg Intramuscular Q15 Min PRN Lamine Figueroa, SUSAN        insulin glargine (LANTUS, SEMGLEE) injection 10 Units  10 Units Subcutaneous Daily Keren Jamison MD   10 Units at 08/06/24 0958    insulin regular (humuLIN R,novoLIN R) injection 3-14 Units  3-14 Units Subcutaneous 4x Daily AC & at Bedtime Keren Jamison MD   3 Units  at 08/03/24 2033    Magnesium Standard Dose Replacement - Follow Nurse / BPA Driven Protocol   Does not apply PRN Abebe Garzon DO        melatonin tablet 2.5 mg  2.5 mg Oral Nightly Reuben Garza APRN   2.5 mg at 08/06/24 2050    nitroglycerin (NITROSTAT) SL tablet 0.4 mg  0.4 mg Sublingual Q5 Min PRN Lamine Figueroa APRN        ondansetron (ZOFRAN) injection 4 mg  4 mg Intravenous Q6H PRN Payam Keyes DO   4 mg at 08/05/24 0800    oxyCODONE (ROXICODONE) immediate release tablet 10 mg  10 mg Oral BID Alejandrina Barnett DO   10 mg at 08/06/24 2049    pantoprazole (PROTONIX) EC tablet 40 mg  40 mg Oral Q12H Reuben Garza APRN   40 mg at 08/06/24 2050    Pharmacy to Dose enoxaparin (LOVENOX)   Does not apply Continuous PRN Abebe Garzon DO        Phosphorus Replacement - Follow Nurse / BPA Driven Protocol   Does not apply PRN Abebe Garzon DO        polyethylene glycol (MIRALAX) packet 17 g  17 g Oral Daily Reuben Garza APRN   17 g at 08/06/24 0959    Potassium Replacement - Follow Nurse / BPA Driven Protocol   Does not apply PRN Abebe Garzon DO        pregabalin (LYRICA) capsule 100 mg  100 mg Oral Nightly Reuben Garza APRN   100 mg at 08/06/24 2050    pregabalin (LYRICA) capsule 50 mg  50 mg Oral Daily Reuben Garza APRN   50 mg at 08/06/24 0958    rosuvastatin (CRESTOR) tablet 10 mg  10 mg Oral Nightly Reuben Garza APRN   10 mg at 08/06/24 2050    sodium bicarbonate tablet 650 mg  650 mg Oral TID Brian Lomas MD   650 mg at 08/06/24 2050    sodium chloride 0.9 % flush 10 mL  10 mL Intravenous Q12H Abebe Garzon DO   10 mL at 08/06/24 2050    sodium chloride 0.9 % flush 10 mL  10 mL Intravenous PRN Abebe aGrzon DO        sodium chloride 0.9 % flush 10 mL  10 mL Intravenous Q12H Lamine Figueroa APRN   10 mL at 08/06/24 2050    sodium chloride 0.9 % flush 10 mL  10 mL Intravenous PRN Lamien Figueroa APRN        sodium chloride 0.9 %  infusion 40 mL  40 mL Intravenous PRN Abebe Garzon DO        sodium chloride 0.9 % infusion 40 mL  40 mL Intravenous PRN Lamine Figueroa APRN        sodium hypochlorite (DAKIN'S 1/4 STRENGTH) 0.125 % topical solution 0.125% solution   Topical Q12H Aby High APRN   Given at 08/06/24 1000       Past Medical History:  Past Medical History:   Diagnosis Date    Arthritis     Autonomic disease     CAD (coronary artery disease) 02/06/2017    Cervical radiculopathy 09/16/2021    Chronic constipation with acute exaccerbation 05/10/2021    Coronary artery disease     Degeneration of cervical intervertebral disc 08/11/2021    Diabetes mellitus     Diabetic foot ulcer 08/31/2020    Diabetic polyneuropathy associated with type 2 diabetes mellitus 01/18/2021    Elevated cholesterol     Gastroesophageal reflux disease 05/13/2019    Headache     HTN (hypertension), benign 05/03/2017    Hyperlipidemia     Hypertension     Mixed hyperlipidemia 02/07/2017    Multiple lung nodules 01/26/2020    5mm, 9 mm RLL identified 1/2020, not present 10/2019.    Myocardial infarction     Osteomyelitis 01/22/2020    Osteomyelitis of fifth toe of right foot 10/07/2019    Pancreatitis     Persistent insomnia 01/20/2020    Renal disorder     Sleep apnea 02/06/2017    Sleep apnea with use of continuous positive airway pressure (CPAP)     NON-COMPLIANT    Slow transit constipation 01/16/2019    Spinal stenosis in cervical region 09/16/2021    Vitamin D deficiency 03/02/2021       Past Surgical History:  Past Surgical History:   Procedure Laterality Date    ABDOMINAL SURGERY      AMPUTATION FOOT / TOE Left 10/2021    5th digit     ANTERIOR CERVICAL DISCECTOMY W/ FUSION N/A 08/05/2022    Procedure: CERVICAL DISCECTOMY ANTERIOR WITH FUSION C5-6 with possible plating of C5-7 with neuromonitoring and 1 c-arm;  Surgeon: Karel Soliz MD;  Location: [facility];  Service: Neurosurgery;  Laterality: N/A;    APPENDECTOMY      BACK SURGERY       CARDIAC CATHETERIZATION Left 02/08/2021    Procedure: Left Heart Cath w poss intervention left anatomical snuff box acess;  Surgeon: Omkar Charles DO;  Location: [facility] CATH INVASIVE LOCATION;  Service: Cardiology;  Laterality: Left;    CARDIAC SURGERY      CATARACT EXTRACTION      CERVICAL SPINE SURGERY      COLONOSCOPY N/A 01/31/2017    Normal exam repeat in 5 years    COLONOSCOPY N/A 02/11/2019    Mild acute inflammation    COLONOSCOPY N/A 04/07/2024    2 areas at 10 and 20 cm with friability ulceration 2 clips placed at 20 cm and 4 clips at 10 cm poor prep normal mucosa,mild eroisions and ulcerations in visible vessels    COLONOSCOPY N/A 7/1/2024    Procedure: COLONOSCOPY WITH ANESTHESIA;  Surgeon: Arsalan Lorenzo DO;  Location: [facility] ENDOSCOPY;  Service: Gastroenterology;  Laterality: N/A;  pre op constipation/diarrhea  post poor prep  pcp Del Shetty    COLONOSCOPY N/A 7/2/2024    Procedure: COLONOSCOPY WITH ANESTHESIA;  Surgeon: Agapito Christopher MD;  Location: [facility] ENDOSCOPY;  Service: Gastroenterology;  Laterality: N/A;  pre rectal bleeding  post poor prep  pcp Del Shetty MD    COLONOSCOPY W/ POLYPECTOMY  03/04/2014    Hyperplastic polyp    CORONARY ARTERY BYPASS GRAFT  10/2015    ENDOSCOPY  04/13/2011    Gastritis with hemorrhage    ENDOSCOPY N/A 05/05/2017    Normal exam    ENDOSCOPY N/A 02/11/2019    Gastritis    ENDOSCOPY N/A 09/01/2020    Non-erosive gastritis with hemorrhage    ENDOSCOPY N/A 02/10/2021    Esophagitis    ENDOSCOPY N/A 04/11/2024    There were esophageal mucosal changes suspicious for short-segment Low's esophagus present in the distal esophagus. The maximum longitudinal extent of these mucosal changes was 2 cm in length. Mucosa was biopsied with a cold forceps for histologyDistal esophagus, biopsies: Mild chronic active esophagogastritis. No evidence of intestinal metaplasia, dysplasia. Antrum, bx, Mild chronic gastritis    FOOT SURGERY Left      INCISION AND DRAINAGE OF WOUND Left 2007    spider bite       Family History  Family History   Problem Relation Age of Onset    Colon cancer Father     Heart disease Father     Colon cancer Sister     Colon polyps Sister     Alzheimer's disease Mother     Coronary artery disease Sister     Coronary artery disease Sister        Social History  Social History     Socioeconomic History    Marital status:    Tobacco Use    Smoking status: Former     Current packs/day: 0.00     Types: Cigarettes     Quit date:      Years since quittin.6    Smokeless tobacco: Never    Tobacco comments:     smoked in highschool   Vaping Use    Vaping status: Never Used   Substance and Sexual Activity    Alcohol use: No    Drug use: No    Sexual activity: Defer       Review of Systems:  History obtained from chart review and the patient  General ROS: No fever or chills  Respiratory ROS: No cough, shortness of breath, wheezing  Cardiovascular ROS: No chest pain or palpitations  Gastrointestinal ROS: No abdominal pain or melena  Genito-Urinary ROS: No dysuria or hematuria  Psych ROS: No anxiety and depression  14 point ROS reviewed with the patient and negative except as noted above and in the HPI unless unable to obtain.    Objective:  Patient Vitals for the past 24 hrs:   BP Temp Temp src Pulse Resp SpO2 Weight   24 0837 146/76 98.6 °F (37 °C) Oral 74 16 96 % --   24 0525 -- -- -- -- -- -- 133 kg (293 lb 3.2 oz)   24 0315 139/77 98.2 °F (36.8 °C) Oral 67 16 95 % --   24 1930 142/61 98.3 °F (36.8 °C) Oral 67 16 96 % --   24 1616 161/71 98.1 °F (36.7 °C) Oral 97 18 97 % --   24 1151 169/95 98.5 °F (36.9 °C) Oral 74 17 98 % --     No intake or output data in the 24 hours ending 24 1007    General: awake/alert   Chest:  clear to auscultation bilaterally without respiratory distress  CVS: regular rate and rhythm  Abdominal: soft, nontender, positive bowel sounds  Extremities: no  cyanosis or edema  Skin:  left foot ulcer and warm and dry without rash      Labs:  Results from last 7 days   Lab Units 08/05/24  0444 08/04/24  0615 08/03/24  0605   WBC 10*3/mm3 5.89 5.73 5.75   HEMOGLOBIN g/dL 9.5* 9.5* 10.3*   HEMATOCRIT % 30.1* 30.5* 33.2*   PLATELETS 10*3/mm3 210 201 215         Results from last 7 days   Lab Units 08/07/24  0758 08/06/24  0508 08/05/24  0444 08/04/24  0615 08/03/24  0605   SODIUM mmol/L 144 143 144 145 143   POTASSIUM mmol/L 3.9 4.1 3.9 3.9 3.8   CHLORIDE mmol/L 110* 109* 112* 111* 110*   CO2 mmol/L 20.0* 22.0 21.0* 21.0* 19.0*   BUN mg/dL 15 18 20 24* 31*   CREATININE mg/dL 1.50* 1.47* 1.47* 1.60* 1.82*   CALCIUM mg/dL 8.6 8.4* 8.3* 8.5* 8.3*   EGFR mL/min/1.73 50.4* 51.6* 51.6* 46.6* 40.0*   BILIRUBIN mg/dL  --   --  0.3 0.3 0.3   ALK PHOS U/L  --   --  102 95 88   ALT (SGPT) U/L  --   --  9 9 7   AST (SGOT) U/L  --   --  16 15 12   GLUCOSE mg/dL 97 101* 89 97 125*       Radiology:   Imaging Results (Last 72 Hours)       ** No results found for the last 72 hours. **            Culture:  Blood Culture   Date Value Ref Range Status   07/31/2024 No growth at 24 hours  Preliminary   07/31/2024 Staphylococcus, coagulase negative (C)  Final         Assessment    Acute kidney injury, ATN--resolving  Baseline chronic kidney disease stage 3b  Type 2 diabetes  Hypertension  Sepsis related to diabetic foot ulcer  Anemia of CKD  Obesity     Plan:   Encourage compliance with medications and diabetic diet  Renal function stable   Monitor labs      SUSAN Calvert  8/7/2024  10:07 CDT      Electronically signed by Stewart Alvarez APRN at 08/07/24 1008          Consult Notes (most recent note)        Tahira Kellogg, SUSAN at 08/02/24 1553        Consult Orders    1. Inpatient Podiatry Consult [[id number]] ordered by Julia Adrian MD at 08/02/24 0948              Attestation signed by Asad Cerrato DPM at 08/05/24 0812    I have reviewed this documentation  and agree.                      [facility] - PODIATRY    Today's Date: 08/02/24     Patient Name: Erick Luong  MRN: [id number]  CSN: [id number]  PCP: Del Shetty MD  Referring Provider: Abebe Garzon DO  Attending Provider: Payam Keyes DO  Length of Stay: 2    SUBJECTIVE   Chief Complaint: Left foot wounds    HPI: Erick Luong, a 69 y.o.male, who was admitted to the hospital on 7/31/2024 for altered mental status.  Patient has a past medical history of arthritis, CAD, type II DM, neuropathy, osteomyelitis, diabetic foot ulcers, hyperlipidemia, hypertension, spinal stenosis.  A full medical history as listed below.  Apparently patient was found by his son who reported patient was confused and wandering around outside in his garage.  Patient is unaccompanied and sitting up in his bedside chair at this current moment.  Patient is unable to provide a full history as he keeps repeating that he forgot what happened.  Patient is able to relay that he has been receiving ongoing care from Dr. Gomes a podiatrist in [place].  Notes Dr. Gomes has been treating his wound to his left foot with Dakin's and Hydrofera Blue.  Patient is currently denying pain.  Relates that he does have a significant amount of numbness and tingling to his bilateral feet.  Patient states his wife would normally be here at bedside with him but she is also currently admitted to the hospital for pneumonia.    Past Medical History:   Diagnosis Date    Arthritis     Autonomic disease     CAD (coronary artery disease) 02/06/2017    Cervical radiculopathy 09/16/2021    Chronic constipation with acute exaccerbation 05/10/2021    Coronary artery disease     Degeneration of cervical intervertebral disc 08/11/2021    Diabetes mellitus     Diabetic foot ulcer 08/31/2020    Diabetic polyneuropathy associated with type 2 diabetes mellitus 01/18/2021    Elevated cholesterol     Gastroesophageal reflux disease 05/13/2019     Headache     HTN (hypertension), benign 05/03/2017    Hyperlipidemia     Hypertension     Mixed hyperlipidemia 02/07/2017    Multiple lung nodules 01/26/2020    5mm, 9 mm RLL identified 1/2020, not present 10/2019.    Myocardial infarction     Osteomyelitis 01/22/2020    Osteomyelitis of fifth toe of right foot 10/07/2019    Pancreatitis     Persistent insomnia 01/20/2020    Renal disorder     Sleep apnea 02/06/2017    Sleep apnea with use of continuous positive airway pressure (CPAP)     NON-COMPLIANT    Slow transit constipation 01/16/2019    Spinal stenosis in cervical region 09/16/2021    Vitamin D deficiency 03/02/2021     Past Surgical History:   Procedure Laterality Date    ABDOMINAL SURGERY      AMPUTATION FOOT / TOE Left 10/2021    5th digit     ANTERIOR CERVICAL DISCECTOMY W/ FUSION N/A 08/05/2022    Procedure: CERVICAL DISCECTOMY ANTERIOR WITH FUSION C5-6 with possible plating of C5-7 with neuromonitoring and 1 c-arm;  Surgeon: Karel Soliz MD;  Location: [facility] OR;  Service: Neurosurgery;  Laterality: N/A;    APPENDECTOMY      BACK SURGERY      CARDIAC CATHETERIZATION Left 02/08/2021    Procedure: Left Heart Cath w poss intervention left anatomical snuff box acess;  Surgeon: Omkar Charles DO;  Location: [facility] CATH INVASIVE LOCATION;  Service: Cardiology;  Laterality: Left;    CARDIAC SURGERY      CATARACT EXTRACTION      CERVICAL SPINE SURGERY      COLONOSCOPY N/A 01/31/2017    Normal exam repeat in 5 years    COLONOSCOPY N/A 02/11/2019    Mild acute inflammation    COLONOSCOPY N/A 04/07/2024    2 areas at 10 and 20 cm with friability ulceration 2 clips placed at 20 cm and 4 clips at 10 cm poor prep normal mucosa,mild eroisions and ulcerations in visible vessels    COLONOSCOPY N/A 7/1/2024    Procedure: COLONOSCOPY WITH ANESTHESIA;  Surgeon: Arsalan Lorenzo DO;  Location: [facility] ENDOSCOPY;  Service: Gastroenterology;  Laterality: N/A;  pre op constipation/diarrhea  post poor  prep  pcp Del Shetty    COLONOSCOPY N/A 2024    Procedure: COLONOSCOPY WITH ANESTHESIA;  Surgeon: Agapito Christopher MD;  Location: [facility] ENDOSCOPY;  Service: Gastroenterology;  Laterality: N/A;  pre rectal bleeding  post poor prep  pcp Del Shetty MD    COLONOSCOPY W/ POLYPECTOMY  2014    Hyperplastic polyp    CORONARY ARTERY BYPASS GRAFT  10/2015    ENDOSCOPY  2011    Gastritis with hemorrhage    ENDOSCOPY N/A 2017    Normal exam    ENDOSCOPY N/A 2019    Gastritis    ENDOSCOPY N/A 2020    Non-erosive gastritis with hemorrhage    ENDOSCOPY N/A 02/10/2021    Esophagitis    ENDOSCOPY N/A 2024    There were esophageal mucosal changes suspicious for short-segment Low's esophagus present in the distal esophagus. The maximum longitudinal extent of these mucosal changes was 2 cm in length. Mucosa was biopsied with a cold forceps for histologyDistal esophagus, biopsies: Mild chronic active esophagogastritis. No evidence of intestinal metaplasia, dysplasia. Antrum, bx, Mild chronic gastritis    FOOT SURGERY Left     INCISION AND DRAINAGE OF WOUND Left 2007    spider bite     Family History   Problem Relation Age of Onset    Colon cancer Father     Heart disease Father     Colon cancer Sister     Colon polyps Sister     Alzheimer's disease Mother     Coronary artery disease Sister     Coronary artery disease Sister      Social History     Socioeconomic History    Marital status:    Tobacco Use    Smoking status: Former     Current packs/day: 0.00     Types: Cigarettes     Quit date:      Years since quittin.6    Smokeless tobacco: Never    Tobacco comments:     smoked in highschool   Vaping Use    Vaping status: Never Used   Substance and Sexual Activity    Alcohol use: No    Drug use: No    Sexual activity: Defer     Allergies   Allergen Reactions    Cefepime Hives and Anaphylaxis    Bactrim [Sulfamethoxazole-Trimethoprim] Other (See Comments)      "\"RENAL FAILURE\"    Vancomycin Itching    Zolpidem Mental Status Change     \"makes him crazy\"    Metronidazole Rash     Current Facility-Administered Medications   Medication Dose Route Frequency Provider Last Rate Last Admin    ascorbic acid (VITAMIN C) tablet 1,000 mg  1,000 mg Oral Daily Reuben Garza APRN   1,000 mg at 08/02/24 1125    sennosides-docusate (PERICOLACE) 8.6-50 MG per tablet 2 tablet  2 tablet Oral BID Lamine Figueroa APRN   2 tablet at 08/02/24 0941    And    polyethylene glycol (MIRALAX) packet 17 g  17 g Oral Daily PRN Lamine Figueroa APRN        And    bisacodyl (DULCOLAX) EC tablet 5 mg  5 mg Oral Daily PRN Lamine Figueroa APRN        And    bisacodyl (DULCOLAX) suppository 10 mg  10 mg Rectal Daily PRN Lamine Figueroa APRN   10 mg at 08/02/24 0941    Calcium Replacement - Follow Nurse / BPA Driven Protocol   Does not apply PRN Abebe Garzon DO        Chlorhexidine Gluconate Cloth 2 % pads 1 Application  1 Application Topical Q24H Lamine Figueroa APRN   1 Application at 08/02/24 0431    dextrose (D50W) (25 g/50 mL) IV injection 10-50 mL  10-50 mL Intravenous Q15 Min PRN Abebe Garzon DO        dextrose (D50W) (25 g/50 mL) IV injection 25 g  25 g Intravenous Q15 Min PRN Lamine Figueroa APRN        dextrose (GLUTOSE) oral gel 15 g  15 g Oral Q15 Min PRN Lamine Figueroa APRN        donepezil (ARICEPT) tablet 10 mg  10 mg Oral Daily Reuben Garza APRN   10 mg at 08/02/24 1319    DULoxetine (CYMBALTA) DR capsule 60 mg  60 mg Oral Daily Reuben Garza APRN   60 mg at 08/02/24 1125    Enoxaparin Sodium (LOVENOX) syringe 135 mg  1 mg/kg Subcutaneous Q12H Abebe Garzon DO   135 mg at 08/02/24 0933    famotidine (PEPCID) tablet 20 mg  20 mg Oral Q12H Reuben Garza APRN   20 mg at 08/02/24 1109    ferric gluconate (FERRLECIT) 250 mg in sodium chloride 0.9 % 250 mL IVPB  250 mg Intravenous Daily Brian Lomas  mL/hr at 08/02/24 1302 250 mg at 08/02/24 1302    " glucagon (GLUCAGEN) injection 1 mg  1 mg Intramuscular Q15 Min PRN Lamine Figueroa APRN        insulin glargine (LANTUS, SEMGLEE) injection 10 Units  10 Units Subcutaneous Daily Keren Jamison MD   10 Units at 08/02/24 0932    insulin regular (humuLIN R,novoLIN R) injection 3-14 Units  3-14 Units Subcutaneous 4x Daily AC & at Bedtime Keren Jamison MD   3 Units at 08/02/24 1134    Magnesium Standard Dose Replacement - Follow Nurse / BPA Driven Protocol   Does not apply PRN Abebe Garzon DO        melatonin tablet 2.5 mg  2.5 mg Oral Nightly Reuben Garza APRN        mupirocin (BACTROBAN) 2 % nasal ointment 1 Application  1 Application Each Nare BID Lamine Figueroa APRN   1 Application at 08/02/24 0932    nitroglycerin (NITROSTAT) SL tablet 0.4 mg  0.4 mg Sublingual Q5 Min PRN Lamine Figueroa APRN        oxyCODONE (ROXICODONE) immediate release tablet 10 mg  10 mg Oral BID Lamine Figueroa APRN   10 mg at 08/02/24 0932    pantoprazole (PROTONIX) EC tablet 40 mg  40 mg Oral Q12H Reuben Garza APRN   40 mg at 08/02/24 1109    Pharmacy to Dose enoxaparin (LOVENOX)   Does not apply Continuous PRN Abebe Garzon DO        Phosphorus Replacement - Follow Nurse / BPA Driven Protocol   Does not apply PRN Abebe Garzon DO        polyethylene glycol (MIRALAX) packet 17 g  17 g Oral Daily Reuben Garza APRN   17 g at 08/02/24 1109    Potassium Replacement - Follow Nurse / BPA Driven Protocol   Does not apply PRN Abebe Garzon DO        pregabalin (LYRICA) capsule 100 mg  100 mg Oral Nightly Reuben Garza APRN        pregabalin (LYRICA) capsule 50 mg  50 mg Oral Daily Reuben Garza APRN   50 mg at 08/02/24 0940    rosuvastatin (CRESTOR) tablet 10 mg  10 mg Oral Nightly Reuben Garza APRN        sodium bicarbonate tablet 650 mg  650 mg Oral TID Brian Lomas MD        sodium chloride 0.9 % flush 10 mL  10 mL Intravenous Q12H Abebe Garzon DO   10 mL at 08/02/24 0919     sodium chloride 0.9 % flush 10 mL  10 mL Intravenous PRN Abebe Garzon, DO        sodium chloride 0.9 % flush 10 mL  10 mL Intravenous Q12H Lamine Figueroa APRN   10 mL at 08/02/24 0933    sodium chloride 0.9 % flush 10 mL  10 mL Intravenous PRN Lamine Figueroa APRN        sodium chloride 0.9 % infusion 40 mL  40 mL Intravenous PRN Abebe Garzon,         sodium chloride 0.9 % infusion 40 mL  40 mL Intravenous PRN Lamine Figueroa APRN        sodium chloride 0.9 % infusion  50 mL/hr Intravenous Continuous Brian Lomas MD 50 mL/hr at 08/02/24 1134 50 mL/hr at 08/02/24 1134    sodium hypochlorite (DAKIN'S 1/4 STRENGTH) 0.125 % topical solution 0.125% solution   Topical Q12H Aby High APRN   Given at 08/02/24 0941    vancomycin (VANCOCIN) 1,000 mg in sodium chloride 0.9 % 250 mL IVPB-VTB  1,000 mg Intravenous Q24H Julia Adrian MD         Review of Systems   Constitutional:  Negative for activity change and fever.   HENT:  Negative for congestion.    Respiratory:  Negative for chest tightness and shortness of breath.    Cardiovascular:  Negative for chest pain.   Gastrointestinal:  Negative for abdominal pain.   Musculoskeletal:  Negative for arthralgias.   Skin:  Positive for wound.   Neurological:  Positive for numbness.   Psychiatric/Behavioral:  Positive for confusion and decreased concentration. Negative for agitation.        OBJECTIVE     Vitals:    08/02/24 1456   BP: 141/75   Pulse: 87   Resp: 16   Temp: 97.5 °F (36.4 °C)   SpO2: 99%       PHYSICAL EXAM  GEN:   Pt presents in bedside chair. Accompanied by none.     Foot/Ankle Exam    GENERAL  Appearance:  chronically ill and appears ill  Orientation:  disoriented  Affect:  unfocused  Right shoe gear: sock  Left shoe gear: sock    VASCULAR     Right Foot Vascularity   Dorsalis pedis:  2+  Posterior tibial:  2+  Skin temperature:  warm  Edema grading:  Trace  CFT:  3  Pedal hair growth:  Absent     Left Foot Vascularity   Dorsalis  pedis:  2+  Posterior tibial:  2+  Skin temperature:  warm  Edema grading:  Trace  CFT:  3  Pedal hair growth:  Absent     NEUROLOGIC     Right Foot Neurologic   Light touch sensation: diminished  Vibratory sensation: diminished  Hot/Cold sensation: diminished     Left Foot Neurologic   Light touch sensation: diminished  Vibratory sensation: diminished  Hot/Cold sensation:  diminished    MUSCULOSKELETAL     Right Foot Musculoskeletal   Tenderness:  none       Left Foot Musculoskeletal    Amputation   Toes amputated: fifth toe  Tenderness:  lateral foot tenderness    MUSCLE STRENGTH     Right Foot Muscle Strength   Foot dorsiflexion:  5  Foot plantar flexion:  5  Foot inversion:  5  Foot eversion:  5     Left Foot Muscle Strength   Foot dorsiflexion:  4+  Foot plantar flexion:  4+  Foot inversion:  4+  Foot eversion:  4+    RANGE OF MOTION     Right Foot Range of Motion   Foot and ankle ROM within normal limits       Left Foot Range of Motion   Foot and ankle ROM within normal limits      DERMATOLOGIC        Left Foot Dermatologic   Skin  Positive for drainage, erythema, maceration and wound.      Left foot additional comments: Multiple areas of ulceration with purulent drainage, erythema, and slight tenderness present. No malodor noticed.     See inserted images below.    Image:                  RADIOLOGY/NUCLEAR:  US Renal Bilateral    Result Date: 8/1/2024  Narrative: US RENAL BILATERAL- 8/1/2024 11:19 AM  HISTORY: Acute kidney injury; E11.628-Type 2 diabetes mellitus with other skin complications; L08.9-Local infection of the skin and subcutaneous tissue, unspecified; M86.9-Osteomyelitis, unspecified; E11.10-Type 2 diabetes mellitus with ketoacidosis without coma; R79.89-Other specified abnormal findings of blood chemistry; I48.91-Unspecified atrial fibrillation; R41.0-Disorientation, unspecified  COMPARISON: None available.   TECHNIQUE: Multiple longitudinal and transverse real-time sonographic images of the  kidneys and urinary bladder are obtained. Report and images stored per institutional and state regulations.    FINDINGS:  Visualized proximal abdominal aorta and IVC are unremarkable.   RIGHT KIDNEY: Right kidney is 10.7 cm in length. Renal cortex is unremarkable. There is no hydronephrosis. There is an exophytic anechoic simple right renal cyst measuring 4.9 cm..  LEFT KIDNEY: Left kidney is 11.3 cm in length. Renal cortex is unremarkable. There is no left hydronephrosis.  PELVIS: Urinary bladder is unremarkable.       Impression:  1. Simple right renal cyst. 2. Otherwise unremarkable kidneys and no hydronephrosis.    This report was signed and finalized on 8/1/2024 1:02 PM by Eran Christina.      CT Abdomen Pelvis With Contrast    Result Date: 8/1/2024  Narrative: EXAMINATION: CT ABDOMEN PELVIS W CONTRAST-   7/31/2024 10:41 PM  HISTORY: abdominal distention with pain; M86.9-Osteomyelitis, unspecified; E11.10-Type 2 diabetes mellitus with ketoacidosis without coma; R79.89-Other specified abnormal findings of blood chemistry; I48.91-Unspecified atrial fibrillation; R41.0-Disorientation, unspecified  In order to have a CT radiation dose as low as reasonably achievable Automated Exposure Control was utilized for adjustment of the mA and/or KV according to patient size.  Total DLP = 1841.72 mGy.cm  The CT scan of the abdomen and pelvis is performed after intravenous contrast enhancement.  The images are acquired in axial plane and subsequent reconstruction in coronal and sagittal planes.  Comparison is made with the previous study dated 6/27/2024.  The lung bases included in the study show a trace right and small left basal pleural effusion. There are mild atelectatic changes at bilateral bases left more than the right.  Limited visualized cardiomediastinal structures show atheromatous changes of the coronary arteries. There is moderate cardiomegaly.  The liver and spleen are normal.  The gallbladder is surgically  absent.  Fatty infiltrated pancreas seen. No focal abnormality. No ductal dilatation. The adrenal glands are normal.  There is persistent bilateral significant perinephric fat infiltration and thickening of the pararenal fascia which is similar to the previous study. Bilateral nephrogram is normal and symmetrical. No calculi. No hydronephrosis. There is a well-defined sharply marginated low density exophytic mass from the lower pole of the right kidney measuring 4.4 cm in diameter. CT density suggest a cyst. Limited visualized ureters are normal and nondilated. The urinary bladder is well distended. No intrinsic abnormality.  Prostate is not significantly enlarged.  There are small fat-containing inguinal hernias, right larger than the left.  There is subcutaneous fat infiltration of the entire abdominal wall extending into the lower extremity. This may represent a fluid overload?.  The stomach is decompressed with moderate wall thickening. No focal abnormality Alamast. Duodenum is normal. Small bowel is nondistended and nondilated. Appendix is surgically absent. There is significant large volume stool throughout the colon. No finding to suggest obstruction.  Atheromatous changes of the abdominal aorta and iliac arteries. No aneurysmal dilatation.  Moderately prominent nonspecific retroperitoneal para-aortic lymph nodes predominantly in the mid abdomen. A referenced left para iliac lymph node, image #57 in series 2 and image #40 and series 3, measures 1.6 cm in short axis. There is a large lymph node in the left lower anterior pelvis/external iliac group measuring 1.3 cm in short axis. There are moderately prominent inguinal lymph nodes. A left inguinal lymph node measures 2 cm in short axis.  Images reviewed in bone window show chronic degenerative changes of the lumbar spine. No acute bony abnormality.      Impression: 1. A significant perinephric fat stranding is similar to the previous study and is a nonspecific  finding. Possibility for chronic inflammatory process/chronic pyelonephritis may not be excluded. Renal functions are normal and symmetrical. 2. Fatty infiltration of the pancreas. No mass. No ductal dilatation. 3. Nonspecific abdominal, pelvic and inguinal lymphadenopathy. The etiology and clinical significance is not certain. This appears moderately more progressive since the previous study. 4. Diffuse subcutaneous fat infiltration of the abdominal wall extending into the lower extremity may represent fluid overload?. 5. A significant large volume of stool in the colon without evidence of obstruction. This may represent constipation.  The above study was initially reviewed and reported by [employer]. I do not find any discrepancies.           This report was signed and finalized on 8/1/2024 7:50 AM by Dr. Carlos Cutler MD.      CT Head Without Contrast    Result Date: 8/1/2024  Narrative: EXAMINATION: CT HEAD WO CONTRAST-   7/31/2024 10:41 PM  HISTORY: altered mental status; M86.9-Osteomyelitis, unspecified; E11.10-Type 2 diabetes mellitus with ketoacidosis without coma; R79.89-Other specified abnormal findings of blood chemistry; I48.91-Unspecified atrial fibrillation; R41.0-Disorientation, unspecified  In order to have a CT radiation dose as low as reasonably achievable Automated Exposure Control was utilized for adjustment of the mA and/or KV according to patient size.  Total DLP = 783.72 mGy.cm  The CT scan of the head is performed without intravenous contrast enhancement.  The images are acquired in axial plane and subsequent reconstruction with coronal and sagittal planes.  Comparison is made with the previous study dated 5/3/2024.  There is no evidence of a mass. There is no midline shift.  There is no evidence of intracranial hemorrhage or hematoma.  Moderately dilated ventricles, basal cisterns and the cortical sulci are similar to the previous study representing chronic volume loss.  Areas of chronic  white matter ischemia bilaterally are noted. The gray-white matter differentiation is maintained.  Posterior fossa structures are normal.  The images reviewed in bone window show no acute displaced skull fracture. A subtle nondisplaced fracture or lesion may be obscured due to motion artifacts. Large mucous retention cyst is seen in the right maxillary antrum. The remaining paranasal sinuses and mastoid air cells are clear.      Impression: 1. No acute intracranial abnormality. 2. Chronic ischemic and atrophic changes. 3. Chronic maxillary sinusitis.  The above study was initially reviewed and reported by [employer]. I do not find any discrepancies.             This report was signed and finalized on 8/1/2024 5:27 AM by Dr. Carlos Cutler MD.      XR Foot 3+ View Left    Result Date: 7/31/2024  Narrative: EXAMINATION:  XR FOOT 3+ VW LEFT-  7/31/2024 6:22 PM  HISTORY: Heel wound.  COMPARISON: 3/26/2024.  TECHNIQUE: 3 views were obtained.  FINDINGS: There is a deep ulcer on the sole of the foot posteriorly. The ulcer appears to be packed with some type of radiopaque material. On the lateral image, there may be a small area of bony erosion involving the calcaneus just posterior to the plantar calcaneal spur. A small area of osteomyelitis is not ruled out. There has been prior amputation of the fifth toe and distal fifth metatarsal. There may have been prior resection of the distal fourth metacarpal and a portion of the proximal phalanx of the fourth toe versus chronic erosive change. The appearance is stable. There is severe narrowing of the first MTP joint. There is narrowing of some of the interphalangeal joints. There is some spurring in the tarsal region and at the tarsal-metatarsal junction. There is soft tissue swelling of the foot diffusely. There is a small curvilinear foreign body in the soft tissues along the sole of the foot in the second toe region in the proximal phalanx area. There is another small linear  foreign body in the soft tissues adjacent to the distal phalanx of the great toe.       Impression: 1. Deep ulcer on the sole of the foot posteriorly near the calcaneus. There is a questionable small area of bony erosion along the plantar surface of the calcaneus just proximal to the plantar calcaneal spur. Osteomyelitis cannot be ruled out. The soft tissue ulcer is packed with some type of radiopaque material. 2. Linear foreign bodies projected over the soft tissues of the second toe and first toe. Artifacts are also included in the differential. 3. Other chronic changes, as discussed.   This report was signed and finalized on 7/31/2024 7:47 PM by Dr. Raz Mendes MD.      XR Chest 1 View    Result Date: 7/31/2024  Narrative: EXAMINATION:  XR CHEST 1 VW-  7/31/2024 6:22 PM  HISTORY: Altered mental status. Hypertension and diabetes.  COMPARISON: 6/28/2024.  TECHNIQUE: Single view AP image.  FINDINGS: There is hypoventilation with vascular crowding. There is mild bronchial wall thickening, stable. There is no dense infiltrate or effusion. Heart size is borderline. Prior heart bypass surgery. Prior cervical fusion. No definite acute bony abnormality.       Impression: 1. Hypoventilation with vascular crowding. 2. Mild bronchial wall thickening, stable.    This report was signed and finalized on 7/31/2024 7:41 PM by Dr. Raz Mendes MD.       LABORATORY/CULTURE RESULTS:  Results from last 7 days   Lab Units 08/02/24  0558 08/01/24 0419 07/31/24  1849   WBC 10*3/mm3 9.76 12.83* 15.48*   HEMOGLOBIN g/dL 10.0* 9.0* 9.8*   HEMATOCRIT % 31.8* 28.3* 29.6*   PLATELETS 10*3/mm3 219 176 201     Results from last 7 days   Lab Units 08/02/24  0558 08/01/24  0419 08/01/24  0210 07/31/24 2030 07/31/24  1849   SODIUM mmol/L 141 138 136   < > 132*   POTASSIUM mmol/L 3.8 4.1 4.0   < > 4.5   CHLORIDE mmol/L 108* 103 103   < > 97*   CO2 mmol/L 19.0* 21.0* 19.0*   < > 19.0*   BUN mg/dL 37* 50* 51*   < > 55*   CREATININE mg/dL 1.98*  2.28* 2.39*   < > 2.67*   CALCIUM mg/dL 8.5* 8.8 8.7   < > 9.0   BILIRUBIN mg/dL  --   --   --   --  0.6   ALK PHOS U/L  --   --   --   --  101   ALT (SGPT) U/L  --   --   --   --  8   AST (SGOT) U/L  --   --   --   --  11   GLUCOSE mg/dL 157* 370* 379*   < > 704*    < > = values in this interval not displayed.         Microbiology Results (last 10 days)       Procedure Component Value - Date/Time    Wound Culture - Drainage, Foot, Left [[id number]] Collected: 08/02/24 1338    Lab Status: Preliminary result Specimen: Drainage from Foot, Left Updated: 08/02/24 1531     Gram Stain Rare (1+) WBCs seen      No organisms seen    Wound Culture - Wound, Foot, Left [[id number]] Collected: 08/02/24 1032    Lab Status: Preliminary result Specimen: Wound from Foot, Left Updated: 08/02/24 1414     Gram Stain Few (2+) WBCs seen      Rare (1+) Gram positive cocci    Eosinophil Smear - Urine, Urine, Clean Catch [[id number]]  (Normal) Collected: 08/01/24 1547    Lab Status: Final result Specimen: Urine, Clean Catch Updated: 08/02/24 0149     Eosinophil Smear 0 % EOS/100 Cells     MRSA Screen, PCR (Inpatient) - Swab, Nares [[id number]]  (Normal) Collected: 08/01/24 0206    Lab Status: Final result Specimen: Swab from Nares Updated: 08/01/24 0346     MRSA PCR No MRSA Detected    Narrative:      The negative predictive value of this diagnostic test is high and should only be used to consider de-escalating anti-MRSA therapy. A positive result may indicate colonization with MRSA and must be correlated clinically.    Urine Culture - Urine, Straight Cath [[id number]]  (Abnormal) Collected: 07/31/24 1851    Lab Status: Preliminary result Specimen: Urine from Straight Cath Updated: 08/02/24 0950     Urine Culture >100,000 CFU/mL Escherichia coli    Narrative:      Colonization of the urinary tract without infection is common. Treatment is discouraged unless the patient is symptomatic, pregnant, or undergoing an invasive urologic procedure.     Blood Culture - Blood, Hand, Left [[id number]]  (Normal) Collected: 07/31/24 1849    Lab Status: Preliminary result Specimen: Blood from Hand, Left Updated: 08/01/24 1915     Blood Culture No growth at 24 hours    Blood Culture - Blood, Arm, Left [[id number]]  (Abnormal) Collected: 07/31/24 1849    Lab Status: Final result Specimen: Blood from Arm, Left Updated: 08/02/24 0545     Blood Culture Staphylococcus, coagulase negative     Isolated from Aerobic Bottle     Gram Stain Aerobic Bottle Gram positive cocci in clusters    Narrative:      Probable contaminant requires clinical correlation, susceptibility not performed unless requested by physician.      Blood Culture ID, PCR - Blood, Arm, Left [[id number]]  (Abnormal) Collected: 07/31/24 1849    Lab Status: Final result Specimen: Blood from Arm, Left Updated: 08/01/24 1117     BCID, PCR Staph spp, not aureus or lugdunensis. Identification by BCID2 PCR.     BOTTLE TYPE Aerobic Bottle            PATHOLOGY RESULTS:       ASSESSMENT/PLAN   Diabetic foot ulcerations to left foot  Type 2 diabetes mellitus with hyperglycemia  Diabetic polyneuropathy    Review of labs, imaging, and other provider documentation  Comprehensive lower extremity examination and evaluation was performed.    Linear foreign bodies projected over the soft tissues of the second  toe and first toe were noted upon xray imaging (artifacts were also included in the differential). No evidence of soft tissue damage, FB entry, or infection noted to either great or second toes today.     Deep wound culture obtained today during debridement of wounds to left foot.    Orders for wound care placed-  Betadine wet-to-dry dressing to be performed twice daily per nursing.    From Podiatry standpoint, no surgical interventions are planned at this time.    Overall recommendation would be to send patient to a skilled nursing facility for ongoing wound care upon discharge.    Thank you kindly for the consult, will  continue to follow patient while in house.      This document has been electronically signed by SUSAN Luna on 2024 16:07 CDT             Electronically signed by Asad Cerrato DPM at 24 0812          Physical Therapy Notes (most recent note)        Alesha Dominguez, PTA at 24 1429  Version 1 of 1         Acute Care - Physical Therapy Treatment Note  [facility]     Patient Name: Erick Luong  : 1955  MRN: [id number]  Today's Date: 2024      Visit Dx:     ICD-10-CM ICD-9-CM   1. Diabetic foot infection  E11.628 250.80    L08.9 686.9   2. Osteomyelitis of foot, unspecified laterality, unspecified type  M86.9 730.27   3. Diabetic ketoacidosis without coma associated with type 2 diabetes mellitus  E11.10 250.12   4. Elevated troponin  R79.89 790.6   5. Atrial fibrillation with RVR  I48.91 427.31   6. Confusion  R41.0 298.9   7. Impaired mobility [Z74.09]  Z74.09 799.89     Patient Active Problem List   Diagnosis    Obesity, unspecified obesity severity, unspecified obesity type    Essential hypertension    Type 2 diabetes mellitus with hyperglycemia, with long-term current use of insulin    Nonsmoker    Anemia due to chronic kidney disease    Class 3 severe obesity due to excess calories with body mass index (BMI) of 40.0 to 44.9 in adult    Anasarca    Sleep apnea with use of continuous positive airway pressure (CPAP)    Medically noncompliant    Diabetic ulcer of left foot associated with type 2 diabetes mellitus    Diabetic polyneuropathy associated with type 2 diabetes mellitus    Spinal stenosis in cervical region    Degeneration of cervical intervertebral disc    Cervical radiculopathy    Degeneration of lumbar or lumbosacral intervertebral disc    Cervical myelopathy    Bilateral carpal tunnel syndrome    CAD (coronary artery disease)    GERD without esophagitis    BPH without obstruction/lower urinary tract symptoms    Stage 3b chronic kidney disease    Chronic  diastolic heart failure    Type 2 myocardial infarction due to heart failure    Left carpal tunnel syndrome    Syncope and collapse, recurrent episodes    Poorly-controlled hypertension    Rhinovirus    Peripheral vascular disease    Chronic kidney disease (CKD), stage IV (severe)    Diabetic foot infection    Sepsis    Epigastric pain    Chronic heart failure with preserved ejection fraction (HFpEF)    Sepsis due to methicillin resistant Staphylococcus aureus (MRSA) with encephalopathy without septic shock    Slow transit constipation    CHF exacerbation    CHF (congestive heart failure), NYHA class I, acute on chronic, combined    Rectal bleeding    Acute on chronic heart failure with preserved ejection fraction (HFpEF)    DKA, type 2, not at goal    Atrial fibrillation    E. coli UTI, ESBL     Past Medical History:   Diagnosis Date    Arthritis     Autonomic disease     CAD (coronary artery disease) 02/06/2017    Cervical radiculopathy 09/16/2021    Chronic constipation with acute exaccerbation 05/10/2021    Coronary artery disease     Degeneration of cervical intervertebral disc 08/11/2021    Diabetes mellitus     Diabetic foot ulcer 08/31/2020    Diabetic polyneuropathy associated with type 2 diabetes mellitus 01/18/2021    Elevated cholesterol     Gastroesophageal reflux disease 05/13/2019    Headache     HTN (hypertension), benign 05/03/2017    Hyperlipidemia     Hypertension     Mixed hyperlipidemia 02/07/2017    Multiple lung nodules 01/26/2020    5mm, 9 mm RLL identified 1/2020, not present 10/2019.    Myocardial infarction     Osteomyelitis 01/22/2020    Osteomyelitis of fifth toe of right foot 10/07/2019    Pancreatitis     Persistent insomnia 01/20/2020    Renal disorder     Sleep apnea 02/06/2017    Sleep apnea with use of continuous positive airway pressure (CPAP)     NON-COMPLIANT    Slow transit constipation 01/16/2019    Spinal stenosis in cervical region 09/16/2021    Vitamin D deficiency  03/02/2021     Past Surgical History:   Procedure Laterality Date    ABDOMINAL SURGERY      AMPUTATION FOOT / TOE Left 10/2021    5th digit     ANTERIOR CERVICAL DISCECTOMY W/ FUSION N/A 08/05/2022    Procedure: CERVICAL DISCECTOMY ANTERIOR WITH FUSION C5-6 with possible plating of C5-7 with neuromonitoring and 1 c-arm;  Surgeon: Karel Soliz MD;  Location: [facility] OR;  Service: Neurosurgery;  Laterality: N/A;    APPENDECTOMY      BACK SURGERY      CARDIAC CATHETERIZATION Left 02/08/2021    Procedure: Left Heart Cath w poss intervention left anatomical snuff box acess;  Surgeon: Omkar Charles DO;  Location: [facility] CATH INVASIVE LOCATION;  Service: Cardiology;  Laterality: Left;    CARDIAC SURGERY      CATARACT EXTRACTION      CERVICAL SPINE SURGERY      COLONOSCOPY N/A 01/31/2017    Normal exam repeat in 5 years    COLONOSCOPY N/A 02/11/2019    Mild acute inflammation    COLONOSCOPY N/A 04/07/2024    2 areas at 10 and 20 cm with friability ulceration 2 clips placed at 20 cm and 4 clips at 10 cm poor prep normal mucosa,mild eroisions and ulcerations in visible vessels    COLONOSCOPY N/A 7/1/2024    Procedure: COLONOSCOPY WITH ANESTHESIA;  Surgeon: Arsalan Lorenzo DO;  Location: [facility] ENDOSCOPY;  Service: Gastroenterology;  Laterality: N/A;  pre op constipation/diarrhea  post poor prep  pcp Del hSetty    COLONOSCOPY N/A 7/2/2024    Procedure: COLONOSCOPY WITH ANESTHESIA;  Surgeon: Agapito Christopher MD;  Location: [facility] ENDOSCOPY;  Service: Gastroenterology;  Laterality: N/A;  pre rectal bleeding  post poor prep  pcp Del Shetty MD    COLONOSCOPY W/ POLYPECTOMY  03/04/2014    Hyperplastic polyp    CORONARY ARTERY BYPASS GRAFT  10/2015    ENDOSCOPY  04/13/2011    Gastritis with hemorrhage    ENDOSCOPY N/A 05/05/2017    Normal exam    ENDOSCOPY N/A 02/11/2019    Gastritis    ENDOSCOPY N/A 09/01/2020    Non-erosive gastritis with hemorrhage    ENDOSCOPY N/A 02/10/2021    Esophagitis     ENDOSCOPY N/A 04/11/2024    There were esophageal mucosal changes suspicious for short-segment Low's esophagus present in the distal esophagus. The maximum longitudinal extent of these mucosal changes was 2 cm in length. Mucosa was biopsied with a cold forceps for histologyDistal esophagus, biopsies: Mild chronic active esophagogastritis. No evidence of intestinal metaplasia, dysplasia. Antrum, bx, Mild chronic gastritis    FOOT SURGERY Left     INCISION AND DRAINAGE OF WOUND Left 09/2007    spider bite     PT Assessment (Last 12 Hours)       PT Evaluation and Treatment       Row Name 08/06/24 1046          Physical Therapy Time and Intention    Subjective Information complains of;fatigue  -LY     Document Type therapy note (daily note)  -LY     Mode of Treatment physical therapy  -LY       Row Name 08/06/24 1046          General Information    Existing Precautions/Restrictions fall;other (see comments)  wound on L heel, surgical shoe  -LY       Row Name 08/06/24 1046          Pain    Pretreatment Pain Rating 0/10 - no pain  -LY     Posttreatment Pain Rating 0/10 - no pain  -LY       Row Name 08/06/24 1046          Bed Mobility    Supine-Sit [place] (Bed Mobility) supervision  -LY     Comment, (Bed Mobility) left sitting EOB  -LY       Row Name 08/06/24 1046          Sit-Stand Transfer    Sit-Stand [place] (Transfers) verbal cues;contact guard  -LY       Row Name 08/06/24 1046          Stand-Sit Transfer    Stand-Sit [place] (Transfers) verbal cues;contact guard  -LY       Row Name 08/06/24 1046          Gait/Stairs (Locomotion)    [place] Level (Gait) verbal cues;contact guard  -LY     Assistive Device (Gait) walker, front-wheeled  -LY     Distance in Feet (Gait) 50  x2  -LY     Deviations/Abnormal Patterns (Gait) gait speed decreased;stride length decreased  -LY     Bilateral Gait Deviations forward flexed posture;heel strike decreased  -LY     Comment, (Gait/Stairs) cues for gait  mechanics, when fatigued decreased L foot clearance noted  -LY       Row Name             Wound 08/22/23 0140 Left posterior plantar    Wound - Properties Group Placement Date: 08/22/23  -AM Placement Time: 0140  -AM Present on Original Admission: Y  -AM Side: Left  -AM Orientation: posterior  -AM Location: plantar  -AM    Retired Wound - Properties Group Placement Date: 08/22/23  -AM Placement Time: 0140  -AM Present on Original Admission: Y  -AM Side: Left  -AM Orientation: posterior  -AM Location: plantar  -AM    Retired Wound - Properties Group Date first assessed: 08/22/23  -AM Time first assessed: 0140  -AM Present on Original Admission: Y  -AM Side: Left  -AM Location: plantar  -AM      Row Name             Wound Left lateral foot Diabetic Ulcer    Wound - Properties Group Side: Left  -MH Orientation: lateral  -MH Location: foot  -JACIEL, 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;diabetic foot ulcer/gangrene  Primary Wound Type: Diabetic ulc  -JACIEL Wound Outcome: Amputation  -JACIEL Stage, Pressure Injury : other (see comments)  -JACIEL, full thickness starting 10/20/21     Retired Wound - Properties Group Side: Left  -MH Orientation: lateral  -MH Location: foot  -JACIEL, 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;diabetic foot ulcer/gangrene  Primary Wound Type: Diabetic ulc  -JACIEL Stage, Pressure Injury : other (see comments)  -JACIEL, full thickness starting 10/20/21  Wound Outcome: Amputation  -JACIEL    Retired Wound - Properties Group Side: Left  -MH Location: foot  -JACIEL, 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;diabetic foot ulcer/gangrene  Primary Wound Type: Diabetic ulc  -JACIEL Wound Outcome: Amputation  -JACIEL      Row Name             Wound 06/15/23 0102 Left anterior plantar    Wound - Properties Group Placement Date: 06/15/23  -SM Placement Time: 0102 -SM Side: Left  -SM Orientation: anterior  -SM Location: plantar  -SM    Retired Wound - Properties Group Placement Date: 06/15/23  -SM Placement Time: 0102 -SM Side: Left  -SM Orientation: anterior  -SM Location: plantar  -SM     Retired Wound - Properties Group Date first assessed: 06/15/23  -SM Time first assessed: 0102 -SM Side: Left  -SM Location: plantar  -SM      Row Name             Wound 04/04/24 2100 Left posterior wrist Skin Tear    Wound - Properties Group Placement Date: 04/04/24  -NR Placement Time: 2100  -NR Present on Original Admission: N  -NR Side: Left  -NR Orientation: posterior  -NR Location: wrist  -NR Primary Wound Type: Skin tear  -NR Additional Comments: likely caused by wristband. Band removed, site cleaned with alcohol swab, wraped in gauze  -NR    Retired Wound - Properties Group Placement Date: 04/04/24  -NR Placement Time: 2100  -NR Present on Original Admission: N  -NR Side: Left  -NR Orientation: posterior  -NR Location: wrist  -NR Primary Wound Type: Skin tear  -NR Additional Comments: likely caused by wristband. Band removed, site cleaned with alcohol swab, wraped in gauze  -NR    Retired Wound - Properties Group Date first assessed: 04/04/24  -NR Time first assessed: 2100  -NR Present on Original Admission: N  -NR Side: Left  -NR Location: wrist  -NR Primary Wound Type: Skin tear  -NR Additional Comments: likely caused by wristband. Band removed, site cleaned with alcohol swab, wraped in gauze  -NR      Row Name 08/06/24 1046          Plan of Care Review    Plan of Care Reviewed With patient  -LY     Progress improving  -LY     Outcome Evaluation PT tx completed. Pt in bed and agrees to therapy. S for bed mobility. Required assist for donning of L surgical shoe. No c/o pain. Pt stands with CGA. Amb 50' at a time with RWX and CGA. As pt fatigeus he requires more cues for safe gait mechanics. Noted L decreased foot clearance at times. Will cont to follow.  -LY       Row Name 08/06/24 1046          Positioning and Restraints    Pre-Treatment Position in bed  -LY     Post Treatment Position bed  -LY     In Bed sitting EOB;call light within reach;encouraged to call for assist  -LY               User Key  (r) =  Recorded By, (t) = Taken By, (c) = Cosigned By      Initials Name Provider Type    JACIEL Yuliana Coello RN Registered Nurse    MH Haase, Mallory L, RN Registered Nurse    Alesha Armenta, PTA Physical Therapist Assistant    Faviola Kunz RN Registered Nurse    Michelle Diaz, RN Registered Nurse    Reuben Johnson RN Registered Nurse                    Physical Therapy Education       Title: PT OT SLP Therapies (In Progress)       Topic: Physical Therapy (Done)       Point: Mobility training (Done)       Learning Progress Summary             Patient Acceptance, E,D, DU,VU by  at 8/4/2024 0794    Comment: benefits of PT and POC, call for A OOB                         Point: Precautions (Done)       Learning Progress Summary             Patient Acceptance, E,D, DU,VU by  at 8/4/2024 3337    Comment: benefits of PT and POC, call for A OOB                                         User Key       Initials Effective Dates Name Provider Type Discipline     02/03/23 -  Daniel Garcia, PT Physical Therapist PT                  PT Recommendation and Plan     Plan of Care Reviewed With: patient  Progress: improving  Outcome Evaluation: PT tx completed. Pt in bed and agrees to therapy. S for bed mobility. Required assist for donning of L surgical shoe. No c/o pain. Pt stands with CGA. Amb 50' at a time with RWX and CGA. As pt fatigeus he requires more cues for safe gait mechanics. Noted L decreased foot clearance at times. Will cont to follow.   Outcome Measures       Row Name 08/04/24 1500             How much help from another is currently needed...    Putting on and taking off regular lower body clothing? 1  -EC      Bathing (including washing, rinsing, and drying) 2  -EC      Toileting (which includes using toilet bed pan or urinal) 2  -EC      Putting on and taking off regular upper body clothing 3  -EC      Taking care of personal grooming (such as brushing teeth) 3  -EC      Eating meals 4   -EC      AM-PAC 6 Clicks Score (OT) 15  -EC         Functional Assessment    Outcome Measure Options AM-PAC 6 Clicks Daily Activity (OT)  -EC                User Key  (r) = Recorded By, (t) = Taken By, (c) = Cosigned By      Initials Name Provider Type    EC Matilda Bryant, OTR/L Occupational Therapist                     Time Calculation:    PT Charges       Row Name 24 1429             Time Calculation    Start Time 1046  -LY      Stop Time 1100  -LY      Time Calculation (min) 14 min  -LY      PT Received On 24  -LY         Time Calculation- PT    Total Timed Code Minutes- PT 14 minute(s)  -LY         Timed Charges    22130 - Gait Training Minutes  14  -LY         Total Minutes    Timed Charges Total Minutes 14  -LY       Total Minutes 14  -LY                User Key  (r) = Recorded By, (t) = Taken By, (c) = Cosigned By      Initials Name Provider Type    LY Alesha Dominguez PTA Physical Therapist Assistant                  Therapy Charges for Today       Code Description Service Date Service Provider Modifiers Qty    05642476308 HC GAIT TRAINING EA 15 MIN 2024 Alesha Dominguez PTA GP 1    38069355993 HC GAIT TRAINING EA 15 MIN 2024 Alesha Dominguez PTA GP 1            PT G-Codes  Outcome Measure Options: AM-PAC 6 Clicks Daily Activity (OT)  AM-PAC 6 Clicks Score (PT): 22  AM-PAC 6 Clicks Score (OT): 15    Alesha Dominguez PTA  2024      Electronically signed by Alesha Dominguez PTA at 24 1430          Occupational Therapy Notes (most recent note)        Matilda Bryant, JENR/L at 24 1505          Acute Care - Occupational Therapy Initial Evaluation  [facility]     Patient Name: Erick Luong  : 1955  MRN: [id number]  Today's Date: 2024     Date of Referral to OT: 24       Admit Date: 2024       ICD-10-CM ICD-9-CM   1. Diabetic foot infection  E11.628 250.80    L08.9 686.9   2. Osteomyelitis of foot, unspecified laterality, unspecified type  M86.9 730.27   3.  Diabetic ketoacidosis without coma associated with type 2 diabetes mellitus  E11.10 250.12   4. Elevated troponin  R79.89 790.6   5. Atrial fibrillation with RVR  I48.91 427.31   6. Confusion  R41.0 298.9   7. Impaired mobility [Z74.09]  Z74.09 799.89     Patient Active Problem List   Diagnosis    Obesity, unspecified obesity severity, unspecified obesity type    Essential hypertension    Type 2 diabetes mellitus with hyperglycemia, with long-term current use of insulin    Nonsmoker    Anemia due to chronic kidney disease    Class 3 severe obesity due to excess calories with body mass index (BMI) of 40.0 to 44.9 in adult    [place]    Sleep apnea with use of continuous positive airway pressure (CPAP)    Medically noncompliant    Diabetic ulcer of left foot associated with type 2 diabetes mellitus    Diabetic polyneuropathy associated with type 2 diabetes mellitus    Spinal stenosis in cervical region    Degeneration of cervical intervertebral disc    Cervical radiculopathy    Degeneration of lumbar or lumbosacral intervertebral disc    Cervical myelopathy    Bilateral carpal tunnel syndrome    CAD (coronary artery disease)    GERD without esophagitis    BPH without obstruction/lower urinary tract symptoms    Stage 3b chronic kidney disease    Chronic diastolic heart failure    Type 2 myocardial infarction due to heart failure    Left carpal tunnel syndrome    Syncope and collapse, recurrent episodes    Poorly-controlled hypertension    Rhinovirus    Peripheral vascular disease    Chronic kidney disease (CKD), stage IV (severe)    Diabetic foot infection    Sepsis    Epigastric pain    Chronic heart failure with preserved ejection fraction (HFpEF)    Sepsis due to methicillin resistant Staphylococcus aureus (MRSA) with encephalopathy without septic shock    Slow transit constipation    CHF exacerbation    CHF (congestive heart failure), NYHA class I, acute on chronic, combined    Rectal bleeding    Acute on chronic  heart failure with preserved ejection fraction (HFpEF)    DKA, type 2, not at goal    Atrial fibrillation    E. coli UTI, ESBL     Past Medical History:   Diagnosis Date    Arthritis     Autonomic disease     CAD (coronary artery disease) 02/06/2017    Cervical radiculopathy 09/16/2021    Chronic constipation with acute exaccerbation 05/10/2021    Coronary artery disease     Degeneration of cervical intervertebral disc 08/11/2021    Diabetes mellitus     Diabetic foot ulcer 08/31/2020    Diabetic polyneuropathy associated with type 2 diabetes mellitus 01/18/2021    Elevated cholesterol     Gastroesophageal reflux disease 05/13/2019    Headache     HTN (hypertension), benign 05/03/2017    Hyperlipidemia     Hypertension     Mixed hyperlipidemia 02/07/2017    Multiple lung nodules 01/26/2020    5mm, 9 mm RLL identified 1/2020, not present 10/2019.    Myocardial infarction     Osteomyelitis 01/22/2020    Osteomyelitis of fifth toe of right foot 10/07/2019    Pancreatitis     Persistent insomnia 01/20/2020    Renal disorder     Sleep apnea 02/06/2017    Sleep apnea with use of continuous positive airway pressure (CPAP)     NON-COMPLIANT    Slow transit constipation 01/16/2019    Spinal stenosis in cervical region 09/16/2021    Vitamin D deficiency 03/02/2021     Past Surgical History:   Procedure Laterality Date    ABDOMINAL SURGERY      AMPUTATION FOOT / TOE Left 10/2021    5th digit     ANTERIOR CERVICAL DISCECTOMY W/ FUSION N/A 08/05/2022    Procedure: CERVICAL DISCECTOMY ANTERIOR WITH FUSION C5-6 with possible plating of C5-7 with neuromonitoring and 1 c-arm;  Surgeon: Karel Soliz MD;  Location:  [facility] OR;  Service: Neurosurgery;  Laterality: N/A;    APPENDECTOMY      BACK SURGERY      CARDIAC CATHETERIZATION Left 02/08/2021    Procedure: Left Heart Cath w poss intervention left anatomical snuff box acess;  Surgeon: Omkar Charles DO;  Location:  [facility] CATH INVASIVE LOCATION;  Service: Cardiology;   Laterality: Left;    CARDIAC SURGERY      CATARACT EXTRACTION      CERVICAL SPINE SURGERY      COLONOSCOPY N/A 01/31/2017    Normal exam repeat in 5 years    COLONOSCOPY N/A 02/11/2019    Mild acute inflammation    COLONOSCOPY N/A 04/07/2024    2 areas at 10 and 20 cm with friability ulceration 2 clips placed at 20 cm and 4 clips at 10 cm poor prep normal mucosa,mild eroisions and ulcerations in visible vessels    COLONOSCOPY N/A 7/1/2024    Procedure: COLONOSCOPY WITH ANESTHESIA;  Surgeon: Arsalan Lorenzo DO;  Location: [facility] ENDOSCOPY;  Service: Gastroenterology;  Laterality: N/A;  pre op constipation/diarrhea  post poor prep  pcp Del Shetty    COLONOSCOPY N/A 7/2/2024    Procedure: COLONOSCOPY WITH ANESTHESIA;  Surgeon: Agapito Christopher MD;  Location: [facility] ENDOSCOPY;  Service: Gastroenterology;  Laterality: N/A;  pre rectal bleeding  post poor prep  pcp Del Shetty MD    COLONOSCOPY W/ POLYPECTOMY  03/04/2014    Hyperplastic polyp    CORONARY ARTERY BYPASS GRAFT  10/2015    ENDOSCOPY  04/13/2011    Gastritis with hemorrhage    ENDOSCOPY N/A 05/05/2017    Normal exam    ENDOSCOPY N/A 02/11/2019    Gastritis    ENDOSCOPY N/A 09/01/2020    Non-erosive gastritis with hemorrhage    ENDOSCOPY N/A 02/10/2021    Esophagitis    ENDOSCOPY N/A 04/11/2024    There were esophageal mucosal changes suspicious for short-segment Low's esophagus present in the distal esophagus. The maximum longitudinal extent of these mucosal changes was 2 cm in length. Mucosa was biopsied with a cold forceps for histologyDistal esophagus, biopsies: Mild chronic active esophagogastritis. No evidence of intestinal metaplasia, dysplasia. Antrum, bx, Mild chronic gastritis    FOOT SURGERY Left     INCISION AND DRAINAGE OF WOUND Left 09/2007    spider bite         OT ASSESSMENT FLOWSHEET (Last 12 Hours)       OT Evaluation and Treatment       Row Name 08/04/24 3621                   OT Time and Intention    Subjective Information  complains of;weakness;fatigue;pain;nausea/vomiting  -EC        Document Type evaluation  -EC        Mode of Treatment occupational therapy;co-treatment  -EC           General Information    Patient Profile Reviewed yes  -EC        Prior Level of Function independent:;all household mobility;feeding;grooming;dressing;bathing  -EC        Pertinent History of Current Functional Problem found wandering at home with hyperglycemia, uncontrolled DM with peripheral neuropathy, BLE edema & L heel wound PMH: a-fib, CAD  -EC        Existing Precautions/Restrictions fall;other (see comments)  wound on L heel, surgical shoe  -EC        Barriers to Rehab medically complex;physical barrier  -EC           Living Environment    Current Living Arrangements home  tub shower  -EC        People in Home spouse;child(julia), dependent  -EC           Home Main Entrance    Number of Stairs, Main Entrance one;none  -EC           Stairs Within Home, Primary    Number of Stairs, Within Home, Primary none  -EC           Pain Assessment    Pretreatment Pain Rating 8/10  -EC        Posttreatment Pain Rating 8/10  -EC        Pain Location generalized  -EC        Pain Location - knee  -EC           Cognition    Orientation Status (Cognition) oriented x 4  -EC           Range of Motion Comprehensive    Comment, General Range of Motion pain with B shoulder AROM past 60%, all other WFL  -EC           Strength Comprehensive (MMT)    Comment, General Manual Muscle Testing (MMT) Assessment B shoulders 2+/5, distal BUE 4/5  -EC           Sensory    Additional Documentation Sensory Assessment (Somatosensory) (Group)  -EC           Sensory Assessment (Somatosensory)    Sensory Assessment (Somatosensory) other (see comments)  n/t in B hands & feet  -EC           Activities of Daily Living    BADL Assessment/Intervention lower body dressing  -EC           Lower Body Dressing Assessment/Training    [place] Level (Lower Body Dressing) lower body dressing  skills;maximum assist (25% patient effort)  -EC        Position (Lower Body Dressing) edge of bed sitting  -EC           BADL Safety/Performance    Impairments, BADL Safety/Performance balance;endurance/activity tolerance;strength;pain  -EC           Bed Mobility    Bed Mobility supine-sit  -EC        Supine-Sit [place] (Bed Mobility) standby assist  -EC           Functional Mobility    Functional Mobility- Ind. Level contact guard assist  -EC        Functional Mobility- Device other (see comments)  HHAx1  -EC        Functional Mobility- Comment bed to BR & chair  -EC           Transfer Assessment/Treatment    Transfers sit-stand transfer;stand-sit transfer  -EC           Sit-Stand Transfer    Sit-Stand [place] (Transfers) minimum assist (75% patient effort)  -EC           Stand-Sit Transfer    Stand-Sit [place] (Transfers) contact guard  -EC           Safety Issues, Functional Mobility    Safety Issues Affecting Function (Mobility) ability to follow commands  -EC        Impairments Affecting Function (Mobility) balance;endurance/activity tolerance;pain;strength;shortness of breath;sensation/sensory awareness  -EC           Balance    Balance Assessment sitting static balance;sitting dynamic balance;standing static balance;standing dynamic balance  -EC        Static Sitting Balance independent  -EC        Dynamic Sitting Balance standby assist  -EC        Position, Sitting Balance unsupported;sitting edge of bed  -EC        Static Standing Balance contact guard  -EC        Dynamic Standing Balance contact guard  -EC        Comment, Balance unsteady d/t L foot wound  -EC           Wound 08/22/23 0140 Left posterior plantar    Wound - Properties Group Placement Date: 08/22/23  -AM Placement Time: 0140  -AM Present on Original Admission: Y  -AM Side: Left  -AM Orientation: posterior  -AM Location: plantar  -AM    Retired Wound - Properties Group Placement Date: 08/22/23  -AM Placement Time: 0140  -AM  Present on Original Admission: Y  -AM Side: Left  -AM Orientation: posterior  -AM Location: plantar  -AM    Retired Wound - Properties Group Date first assessed: 08/22/23  -AM Time first assessed: 0140  -AM Present on Original Admission: Y  -AM Side: Left  -AM Location: plantar  -AM       Wound Left lateral foot Diabetic Ulcer    Wound - Properties Group Side: Left  -MH Orientation: lateral  -MH Location: foot  -JACIEL, 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;diabetic foot ulcer/gangrene  Primary Wound Type: Diabetic ulc  -JACIEL Wound Outcome: Amputation  -JACIEL Stage, Pressure Injury : other (see comments)  -JACIEL, full thickness starting 10/20/21     Retired Wound - Properties Group Side: Left  -MH Orientation: lateral  -MH Location: foot  -JACIEL, 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;diabetic foot ulcer/gangrene  Primary Wound Type: Diabetic ulc  -JACIEL Stage, Pressure Injury : other (see comments)  -JACIEL, full thickness starting 10/20/21  Wound Outcome: Amputation  -JACIEL    Retired Wound - Properties Group Side: Left  -MH Location: foot  -JACIEL, 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;diabetic foot ulcer/gangrene  Primary Wound Type: Diabetic ulc  -JACIEL Wound Outcome: Amputation  -JACIEL       Wound 06/15/23 0102 Left anterior plantar    Wound - Properties Group Placement Date: 06/15/23  -SM Placement Time: 0102  -SM Side: Left  -SM Orientation: anterior  -SM Location: plantar  -SM    Retired Wound - Properties Group Placement Date: 06/15/23  -SM Placement Time: 0102  -SM Side: Left  -SM Orientation: anterior  -SM Location: plantar  -SM    Retired Wound - Properties Group Date first assessed: 06/15/23  -SM Time first assessed: 0102  -SM Side: Left  -SM Location: plantar  -SM       Wound 04/04/24 2100 Left posterior wrist Skin Tear    Wound - Properties Group Placement Date: 04/04/24  -NR Placement Time: 2100  -NR Present on Original Admission: N  -NR Side: Left  -NR Orientation: posterior  -NR Location: wrist  -NR Primary Wound Type: Skin tear  -NR Additional Comments: likely caused by  wristband. Band removed, site cleaned with alcohol swab, wraped in gauze  -NR    Retired Wound - Properties Group Placement Date: 04/04/24  -NR Placement Time: 2100  -NR Present on Original Admission: N  -NR Side: Left  -NR Orientation: posterior  -NR Location: wrist  -NR Primary Wound Type: Skin tear  -NR Additional Comments: likely caused by wristband. Band removed, site cleaned with alcohol swab, wraped in gauze  -NR    Retired Wound - Properties Group Date first assessed: 04/04/24  -NR Time first assessed: 2100 -NR Present on Original Admission: N  -NR Side: Left  -NR Location: wrist  -NR Primary Wound Type: Skin tear  -NR Additional Comments: likely caused by wristband. Band removed, site cleaned with alcohol swab, wraped in gauze  -NR       Plan of Care Review    Plan of Care Reviewed With patient  -EC        Progress no change  -EC        Outcome Evaluation OT eval complete.  Pt A&Ox4 c/o 8/10 pain, weakness, SOA, & nausea. He performs bed mob with SBA, indep with sitting balance EOB. He requires a surgical shoe on his L foot d/t a wound & requires maxA to don. Pain with B shoulder ROM past 60%, demos weakness throughout all extremities. Pt stood & amb short distance around the room with HHAx1. He is a high fall risk d/t decreased sensation in his BLEs. He demos significantly decreased act chase which is limiting his ability to carry out ADLs. Skilled OT indicated to address his deficits and maximize his indep & safety. Rec d/c to SNF for cont rehab vs home w HH  -EC           Positioning and Restraints    Pre-Treatment Position in bed  -EC        Post Treatment Position chair  -EC        In Chair reclined;call light within reach;encouraged to call for assist;legs elevated  -EC           Therapy Assessment/Plan (OT)    Date of Referral to OT 08/03/24  -EC        OT Diagnosis decreased ADLs  -EC        Rehab Potential (OT) good, to achieve stated therapy goals  -EC        Criteria for Skilled Therapeutic  Interventions Met (OT) yes;meets criteria;skilled treatment is necessary  -EC        Therapy Frequency (OT) 5 times/wk  -EC        Predicted Duration of Therapy Intervention (OT) 10 days  -EC        Planned Therapy Interventions (OT) activity tolerance training;BADL retraining;functional balance retraining;occupation/activity based interventions;patient/caregiver education/training;strengthening exercise;transfer/mobility retraining  -EC           OT Goals    Transfer Goal Selection (OT) transfer, OT goal 1  -EC        Dressing Goal Selection (OT) dressing, OT goal 1  -EC        Strength Goal Selection (OT) strength, OT goal 1  -EC           Transfer Goal 1 (OT)    Activity/Assistive Device (Transfer Goal 1, OT) sit-to-stand/stand-to-sit;toilet;shower chair;tub  -EC        [place] Level/Cues Needed (Transfer Goal 1, OT) supervision required  -EC        Time Frame (Transfer Goal 1, OT) long term goal (LTG)  -EC        Progress/Outcome (Transfer Goal 1, OT) new goal  -EC           Dressing Goal 1 (OT)    Activity/Device (Dressing Goal 1, OT) dressing skills, all  -EC        [place]/Cues Needed (Dressing Goal 1, OT) minimum assist (75% or more patient effort)  -EC        Time Frame (Dressing Goal 1, OT) long term goal (LTG)  -EC        Strategies/Barriers (Dressing Goal 1, OT) AE PRN  -EC        Progress/Outcome (Dressing Goal 1, OT) new goal  -EC           Strength Goal 1 (OT)    Strength Goal 1 (OT) Pt will increase BUE strength to 4+/5 for increased safety & indep with ADLs & fxnal transfers  -EC        Time Frame (Strength Goal 1, OT) long term goal (LTG)  -EC        Progress/Outcome (Strength Goal 1, OT) new goal  -EC                  User Key  (r) = Recorded By, (t) = Taken By, (c) = Cosigned By      Initials Name Effective Dates    Yuliana Lisa RN 06/16/21 - 02/13/22    MH Haase, Mallory L, RN 06/16/21 - 07/14/22    Faviola Kunz RN 06/16/21 -     Michelle Diaz RN 09/22/22 -      Reuben Johnson RN 02/14/23 -      Matilda Bryant, OTR/L 10/13/23 -                      Occupational Therapy Education       Title: PT OT SLP Therapies (In Progress)       Topic: Occupational Therapy (In Progress)       Point: ADL training (Done)       Description:   Instruct learner(s) on proper safety adaptation and remediation techniques during self care or transfers.   Instruct in proper use of assistive devices.                  Learning Progress Summary             Patient Acceptance, E, VU,NR by  at 8/4/2024 1426                         Point: Home exercise program (Not Started)       Description:   Instruct learner(s) on appropriate technique for monitoring, assisting and/or progressing therapeutic exercises/activities.                  Learner Progress:  Not documented in this visit.              Point: Precautions (Done)       Description:   Instruct learner(s) on prescribed precautions during self-care and functional transfers.                  Learning Progress Summary             Patient Acceptance, E, VU,NR by  at 8/4/2024 1426                         Point: Body mechanics (Done)       Description:   Instruct learner(s) on proper positioning and spine alignment during self-care, functional mobility activities and/or exercises.                  Learning Progress Summary             Patient Acceptance, E, VU,NR by  at 8/4/2024 1426                                         User Key       Initials Effective Dates Name Provider Type Discipline     10/13/23 -  Matilda Bryant, OTR/L Occupational Therapist OT                      OT Recommendation and Plan  Planned Therapy Interventions (OT): activity tolerance training, BADL retraining, functional balance retraining, occupation/activity based interventions, patient/caregiver education/training, strengthening exercise, transfer/mobility retraining  Therapy Frequency (OT): 5 times/wk  Plan of Care Review  Plan of Care Reviewed With:  patient  Progress: no change  Outcome Evaluation: OT eval complete.  Pt A&Ox4 c/o 8/10 pain, weakness, SOA, & nausea. He performs bed mob with SBA, indep with sitting balance EOB. He requires a surgical shoe on his L foot d/t a wound & requires maxA to don. Pain with B shoulder ROM past 60%, demos weakness throughout all extremities. Pt stood & amb short distance around the room with HHAx1. He is a high fall risk d/t decreased sensation in his BLEs. He demos significantly decreased act chase which is limiting his ability to carry out ADLs. Skilled OT indicated to address his deficits and maximize his indep & safety. Rec d/c to SNF for cont rehab vs home w   Plan of Care Reviewed With: patient  Outcome Evaluation: OT joel complete.  Pt A&Ox4 c/o 8/10 pain, weakness, SOA, & nausea. He performs bed mob with SBA, indep with sitting balance EOB. He requires a surgical shoe on his L foot d/t a wound & requires maxA to don. Pain with B shoulder ROM past 60%, demos weakness throughout all extremities. Pt stood & amb short distance around the room with HHAx1. He is a high fall risk d/t decreased sensation in his BLEs. He demos significantly decreased act chase which is limiting his ability to carry out ADLs. Skilled OT indicated to address his deficits and maximize his indep & safety. Rec d/c to SNF for cont rehab vs home w      Outcome Measures       Row Name 08/04/24 1500             How much help from another is currently needed...    Putting on and taking off regular lower body clothing? 1  -EC      Bathing (including washing, rinsing, and drying) 2  -EC      Toileting (which includes using toilet bed pan or urinal) 2  -EC      Putting on and taking off regular upper body clothing 3  -EC      Taking care of personal grooming (such as brushing teeth) 3  -EC      Eating meals 4  -EC      AM-PAC 6 Clicks Score (OT) 15  -EC         Functional Assessment    Outcome Measure Options AM-PAC 6 Clicks Daily Activity (OT)  -EC                 User Key  (r) = Recorded By, (t) = Taken By, (c) = Cosigned By      Initials Name Provider Type     Matilda Bryant OTR/L Occupational Therapist                    Time Calculation:              YUNI Goldstein  8/4/2024    Electronically signed by Matilda Bryant OTR/L at 08/04/24 8535

## 2024-08-07 NOTE — PROGRESS NOTES
"INFECTIOUS DISEASES PROGRESS NOTE    Patient:  Erick Luong  YOB: 1955  MRN: [id number]   Admit date: 7/31/2024   Admitting Physician: Alejandrina Barnett DO  Primary Care Physician: Del Shetty MD    Chief Complaint: Still not much appetite    Interval History: Patient notes that he does not have much of an appetite still.  He is sitting up at bedside.  ELVIS Murphy getting ready to give him some of his medications.        Allergies:   Allergies   Allergen Reactions    Cefepime Hives and Anaphylaxis    Bactrim [Sulfamethoxazole-Trimethoprim] Other (See Comments)     \"RENAL FAILURE\"    Vancomycin Itching    Zolpidem Mental Status Change     \"makes him crazy\"    Metronidazole Rash       Current Scheduled Medications:   ascorbic acid, 1,000 mg, Oral, Daily  donepezil, 10 mg, Oral, Daily  DULoxetine, 60 mg, Oral, Daily  enoxaparin, 1 mg/kg, Subcutaneous, Q12H  ertapenem, 1,000 mg, Intravenous, Q24H  famotidine, 20 mg, Oral, Q12H  insulin glargine, 10 Units, Subcutaneous, Daily  insulin regular, 3-14 Units, Subcutaneous, 4x Daily AC & at Bedtime  melatonin, 2.5 mg, Oral, Nightly  oxyCODONE, 10 mg, Oral, BID  pantoprazole, 40 mg, Oral, Q12H  polyethylene glycol, 17 g, Oral, Daily  pregabalin, 100 mg, Oral, Nightly  pregabalin, 50 mg, Oral, Daily  rosuvastatin, 10 mg, Oral, Nightly  senna-docusate sodium, 2 tablet, Oral, BID  sodium bicarbonate, 650 mg, Oral, TID  sodium chloride, 10 mL, Intravenous, Q12H  sodium chloride, 10 mL, Intravenous, Q12H  sodium hypochlorite, , Topical, Q12H      Current PRN Medications:    senna-docusate sodium **AND** polyethylene glycol **AND** bisacodyl **AND** bisacodyl    Calcium Replacement - Follow Nurse / BPA Driven Protocol    dextrose    dextrose    dextrose    glucagon (human recombinant)    Magnesium Standard Dose Replacement - Follow Nurse / BPA Driven Protocol    nitroglycerin    ondansetron    Pharmacy to Dose enoxaparin (LOVENOX)    Phosphorus Replacement - " "Follow Nurse / BPA Driven Protocol    Potassium Replacement - Follow Nurse / BPA Driven Protocol    sodium chloride    sodium chloride    sodium chloride    sodium chloride    Pharmacy to Dose enoxaparin (LOVENOX),            Objective     Vital Signs:  Temp (24hrs), Av.3 °F (36.8 °C), Min:98.1 °F (36.7 °C), Max:98.6 °F (37 °C)      /76 (BP Location: Right arm, Patient Position: Lying)   Pulse 74   Temp 98.6 °F (37 °C) (Oral)   Resp 16   Ht 182.9 cm (72\")   Wt 133 kg (293 lb 3.2 oz)   SpO2 96%   BMI 39.77 kg/m²         Physical Exam:    General: Patient sitting up at bedside in no acute distress  Lungs: Fairly clear posteriorly  Left foot with dressing in place and offload shoe in place    Results Review:    I reviewed the patient's new clinical results.    Lab Results:    CBC:   Lab Results   Lab 24  1849 24  0419 24  0558 24  0624  0615 08/05/24  0444   WBC 15.48* 12.83* 9.76 5.75 5.73 5.89   HEMOGLOBIN 9.8* 9.0* 10.0* 10.3* 9.5* 9.5*   HEMATOCRIT 29.6* 28.3* 31.8* 33.2* 30.5* 30.1*   PLATELETS 201 176 219 215 201 210        AutoDiff:   Lab Results   Lab 24  0624  0615 24   NEUTROPHIL % 57.9 60.7 64.0   LYMPHOCYTE % 20.9 20.1 19.9   MONOCYTES % 8.7 9.6 9.2   EOSINOPHIL % 11.1* 7.9* 4.8   BASOPHIL % 0.9 0.7 0.7   NEUTROS ABS 3.33 3.48 3.78   LYMPHS ABS 1.20 1.15 1.17   MONOS ABS 0.50 0.55 0.54   EOS ABS 0.64* 0.45* 0.28   BASOS ABS 0.05 0.04 0.04        Manual Diff:    Lab Results   Lab 24  0605 24  0615 24   NEUTROS ABS 3.33 3.48 3.78           CMP:   Lab Results   Lab 24  0605 24  0615 24  0444 24  0508 24  0758   SODIUM 143 145 144 143 144   POTASSIUM 3.8 3.9 3.9 4.1 3.9   CHLORIDE 110* 111* 112* 109* 110*   CO2 19.0* 21.0* 21.0* 22.0 20.0*   BUN 31* 24* 20 18 15   CREATININE 1.82* 1.60* 1.47* 1.47* 1.50*   CALCIUM 8.3* 8.5* 8.3* 8.4* 8.6   BILIRUBIN 0.3 0.3 0.3  --   --    ALK PHOS " 88 95 102  --   --    ALT (SGPT) 7 9 9  --   --    AST (SGOT) 12 15 16  --   --    GLUCOSE 125* 97 89 101* 97       Estimated Creatinine Clearance: 66.5 mL/min (A) (by C-G formula based on SCr of 1.5 mg/dL (H)).    Culture Results:    Microbiology Results (last 10 days)       Procedure Component Value - Date/Time    Wound Culture - Drainage, Foot, Left [[id number]]  (Abnormal) Collected: 08/02/24 1338    Lab Status: Final result Specimen: Drainage from Foot, Left Updated: 08/04/24 0857     Wound Culture Light growth (2+) Streptococcus agalactiae (Group B)     Comment:   This organism is considered to be universally susceptible to penicillin.  No further antibiotic testing will be performed. If Clindamycin or Erythromycin is the drug of choice, notify the laboratory within 7 days to request susceptibility testing.        Gram Stain Rare (1+) WBCs seen      No organisms seen    Wound Culture - Wound, Foot, Left [[id number]]  (Abnormal) Collected: 08/02/24 1032    Lab Status: Final result Specimen: Wound from Foot, Left Updated: 08/04/24 0857     Wound Culture Light growth (2+) Streptococcus agalactiae (Group B)     Comment:   This organism is considered to be universally susceptible to penicillin.  No further antibiotic testing will be performed. If Clindamycin or Erythromycin is the drug of choice, notify the laboratory within 7 days to request susceptibility testing.        Gram Stain Few (2+) WBCs seen      Rare (1+) Gram positive cocci    Eosinophil Smear - Urine, Urine, Clean Catch [[id number]]  (Normal) Collected: 08/01/24 1547    Lab Status: Final result Specimen: Urine, Clean Catch Updated: 08/02/24 0149     Eosinophil Smear 0 % EOS/100 Cells     MRSA Screen, PCR (Inpatient) - Swab, Nares [[id number]]  (Normal) Collected: 08/01/24 0206    Lab Status: Final result Specimen: Swab from Nares Updated: 08/01/24 0346     MRSA PCR No MRSA Detected    Narrative:      The negative predictive value of this diagnostic test  is high and should only be used to consider de-escalating anti-MRSA therapy. A positive result may indicate colonization with MRSA and must be correlated clinically.    Urine Culture - Urine, Straight Cath [[id number]]  (Abnormal)  (Susceptibility) Collected: 07/31/24 1851    Lab Status: Final result Specimen: Urine from Straight Cath Updated: 08/04/24 1129     Urine Culture >100,000 CFU/mL Escherichia coli ESBL    Narrative:      Colonization of the urinary tract without infection is common. Treatment is discouraged unless the patient is symptomatic, pregnant, or undergoing an invasive urologic procedure.  Recent outcomes data supports the use of pip/tazo in the treatment of susceptible ESBL infections for uncomplicated UTI. Consider use of pip/tazo as a carbapenem-sparing regimen in applicable patients.    Susceptibility        Escherichia coli ESBL      MATT      Amikacin Susceptible      Ertapenem Susceptible      Gentamicin Resistant      Levofloxacin Susceptible      Meropenem Susceptible      Nitrofurantoin Susceptible      Piperacillin + Tazobactam Susceptible      Tobramycin Resistant      Trimethoprim + Sulfamethoxazole Resistant                           Blood Culture - Blood, Hand, Left [[id number]]  (Normal) Collected: 07/31/24 1849    Lab Status: Final result Specimen: Blood from Hand, Left Updated: 08/05/24 1915     Blood Culture No growth at 5 days    Blood Culture - Blood, Arm, Left [[id number]]  (Abnormal) Collected: 07/31/24 1849    Lab Status: Final result Specimen: Blood from Arm, Left Updated: 08/02/24 0545     Blood Culture Staphylococcus, coagulase negative     Isolated from Aerobic Bottle     Gram Stain Aerobic Bottle Gram positive cocci in clusters    Narrative:      Probable contaminant requires clinical correlation, susceptibility not performed unless requested by physician.      Blood Culture ID, PCR - Blood, Arm, Left [[id number]]  (Abnormal) Collected: 07/31/24 1849    Lab Status: Final  result Specimen: Blood from Arm, Left Updated: 08/01/24 1117     BCID, PCR Staph spp, not aureus or lugdunensis. Identification by BCID2 PCR.     BOTTLE TYPE Aerobic Bottle                 Radiology:   Imaging Results (Last 72 Hours)       ** No results found for the last 72 hours. **                Active Hospital Problems    Diagnosis     **DKA, type 2, not at goal     E. coli UTI, ESBL     Atrial fibrillation     Chronic heart failure with preserved ejection fraction (HFpEF)     Chronic diastolic heart failure     GERD without esophagitis     Diabetic ulcer of left foot associated with type 2 diabetes mellitus        IMPRESSION:  Diabetic foot infection-cultures x 2 positive for group B streptococcus.  Patient is status post bedside debridement per SUSAN Hoffmann  Urinary tract infection with ESBL E. coli.  According to report, this was a straight catheterization specimen and patient patient did complain of some urinary frequency.  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  Uncontrolled diabetes mellitus with hemoglobin A1c greater than 12.  Noted patient is controlled here on half the insulin he reportedly was taking at home.  Chronic kidney disease stage IIIb with acute kidney injury.  Creatinine essentially stable.  Positive blood cultures for coagulase-negative staph-contaminant    RECOMMENDATION:   Discontinue ertapenem after today's dose.  Will have nursing place updated photo in chart of left foot heel wound as well as lateral foot  Will reevaluate tomorrow for need of any additional oral antibiotic therapy.  Diabetes management per attending  Noted recommendations for skilled nursing facility for better wound care in this patient      Review Dr. Barnett's note  Reviewed neurology note    Julia Adrian MD  08/07/24  15:11 CDT

## 2024-08-07 NOTE — PAYOR COMM NOTE
"Erick Luong (69 y.o. Male) [id number]   CONT CLINICAL   Please review clinical for Additional Days      [facility] phone     fax     8/7 so far today don't have progress note from hospitalist        Date of Birth   1955    Social Security Number       Address   [address] [place] [address]    Home Phone       MRN   [id number]       Catholic   Caodaism    Marital Status                               Admission Date   7/31/24    Admission Type   Emergency    Admitting Provider   Alejandrina Barnett DO    Attending Provider   Alejandrina Barnett DO    Department, Room/Bed   [facility] 3C, 365/1       Discharge Date       Discharge Disposition       Discharge Destination                                 Attending Provider: Alejandrina Barnett DO    Allergies: Cefepime, Bactrim [Sulfamethoxazole-trimethoprim], Vancomycin, Zolpidem, Metronidazole    Isolation: Contact   Infection: MRSA (05/19/19), COVID (History) (08/08/22), ESBL E coli (06/30/24)   Code Status: CPR    Ht: 182.9 cm (72\")   Wt: 133 kg (293 lb 3.2 oz)    Admission Cmt: None   Principal Problem: DKA, type 2, not at goal [E11.10]                   Active Insurance as of 7/31/2024       Primary Coverage       Payor Plan Insurance Group Employer/Plan Group    [facility] BLUE CROSS [facility] [employer] EMPLOYEE [id number]       Payor Plan Address Payor Plan Phone Number Payor Plan Fax Number Effective Dates    [address] [phone]  1/1/2022 - None Entered    [facility] [id number]         Subscriber Name Subscriber Birth Date Member ID       ZAINAB LUONG 11/27/1969 [id number]               Secondary Coverage       Payor Plan Insurance Group Employer/Plan Group    MEDICARE MEDICARE A & B        Payor Plan Address Payor Plan Phone Number Payor Plan Fax Number Effective Dates    [address] [phone]  7/1/2013 - None Entered    [facility] [address]         Subscriber Name " Subscriber Birth Date Member ID       SUZETTE LUONG 1955 [id number]                     Emergency Contacts        (Rel.) Home Phone Work Phone Mobile Phone    Joan Luong (Spouse) [phone]              Vital Signs (last day)       Date/Time Temp Temp src Pulse Resp BP Patient Position SpO2    08/07/24 0837 98.6 (37) Oral 74 16 146/76 Lying 96    08/07/24 0315 98.2 (36.8) Oral 67 16 139/77 Lying 95    08/06/24 1930 98.3 (36.8) Oral 67 16 142/61 Lying 96    08/06/24 1616 98.1 (36.7) Oral 97 18 161/71 Sitting 97    08/06/24 1151 98.5 (36.9) Oral 74 17 169/95 Sitting 98    08/06/24 0802 97.5 (36.4) Oral 73 18 170/86 Lying 96    08/06/24 0408 97.5 (36.4) Oral 73 18 134/59 Lying 94          Current Facility-Administered Medications   Medication Dose Route Frequency Provider Last Rate Last Admin    ascorbic acid (VITAMIN C) tablet 1,000 mg  1,000 mg Oral Daily Reuben Garza APRN   1,000 mg at 08/07/24 1107    sennosides-docusate (PERICOLACE) 8.6-50 MG per tablet 2 tablet  2 tablet Oral BID Lamine Figueroa APRN   2 tablet at 08/07/24 1108    And    polyethylene glycol (MIRALAX) packet 17 g  17 g Oral Daily PRN Lamine Figueroa APRN        And    bisacodyl (DULCOLAX) EC tablet 5 mg  5 mg Oral Daily PRN Lamine Figueroa APRN        And    bisacodyl (DULCOLAX) suppository 10 mg  10 mg Rectal Daily PRN Lamine Figueroa APRN   10 mg at 08/02/24 0941    Calcium Replacement - Follow Nurse / BPA Driven Protocol   Does not apply PRN Abebe Garzon DO        dextrose (D50W) (25 g/50 mL) IV injection 10-50 mL  10-50 mL Intravenous Q15 Min PRN Abebe Garzon DO        dextrose (D50W) (25 g/50 mL) IV injection 25 g  25 g Intravenous Q15 Min PRN Lamine Figueroa APRN        dextrose (GLUTOSE) oral gel 15 g  15 g Oral Q15 Min PRN Lamine Figueroa APRN        donepezil (ARICEPT) tablet 10 mg  10 mg Oral Daily Reuben Garza APRN   10 mg at 08/07/24 1107    DULoxetine  (CYMBALTA) DR capsule 60 mg  60 mg Oral Daily Reuben Garza APRN   60 mg at 08/07/24 1106    Enoxaparin Sodium (LOVENOX) syringe 135 mg  1 mg/kg Subcutaneous Q12H Abebe Garzon DO   135 mg at 08/07/24 1109    ertapenem (INVanz) 1,000 mg in sodium chloride 0.9 % 100 mL MBP  1,000 mg Intravenous Q24H Julia Adrian  mL/hr at 08/06/24 1516 1,000 mg at 08/06/24 1516    famotidine (PEPCID) tablet 20 mg  20 mg Oral Q12H Reuben Garza APRN   20 mg at 08/07/24 1109    glucagon (GLUCAGEN) injection 1 mg  1 mg Intramuscular Q15 Min PRN Lamine Figueroa APRN        insulin glargine (LANTUS, SEMGLEE) injection 10 Units  10 Units Subcutaneous Daily Keren Jamison MD   10 Units at 08/07/24 1236    insulin regular (humuLIN R,novoLIN R) injection 3-14 Units  3-14 Units Subcutaneous 4x Daily AC & at Bedtime Keren Jamison MD   3 Units at 08/03/24 2033    Magnesium Standard Dose Replacement - Follow Nurse / BPA Driven Protocol   Does not apply PRN Abebe Garzon DO        melatonin tablet 2.5 mg  2.5 mg Oral Nightly Reuben Garza APRN   2.5 mg at 08/06/24 2050    nitroglycerin (NITROSTAT) SL tablet 0.4 mg  0.4 mg Sublingual Q5 Min PRN Lamine Figueroa APRN        ondansetron (ZOFRAN) injection 4 mg  4 mg Intravenous Q6H PRN Payam Keyes DO   4 mg at 08/05/24 0800    oxyCODONE (ROXICODONE) immediate release tablet 10 mg  10 mg Oral BID Alejandrina Barnett DO   10 mg at 08/07/24 1108    pantoprazole (PROTONIX) EC tablet 40 mg  40 mg Oral Q12H Reuben Garza APRN   40 mg at 08/07/24 1109    Pharmacy to Dose enoxaparin (LOVENOX)   Does not apply Continuous PRN Abebe Garzon,         Phosphorus Replacement - Follow Nurse / BPA Driven Protocol   Does not apply PRN Abebe Garzon,         polyethylene glycol (MIRALAX) packet 17 g  17 g Oral Daily Reuben Garza APRN   17 g at 08/07/24 1109    Potassium Replacement - Follow Nurse / BPA Driven Protocol   Does not apply PRN  Abebe Garzon DO        pregabalin (LYRICA) capsule 100 mg  100 mg Oral Nightly Reuben Garza APRN   100 mg at 08/06/24 2050    pregabalin (LYRICA) capsule 50 mg  50 mg Oral Daily Reuben Garza APRN   50 mg at 08/07/24 1108    rosuvastatin (CRESTOR) tablet 10 mg  10 mg Oral Nightly Reuben Garza APRN   10 mg at 08/06/24 2050    sodium bicarbonate tablet 650 mg  650 mg Oral TID Brian Lomas MD   650 mg at 08/07/24 1107    sodium chloride 0.9 % flush 10 mL  10 mL Intravenous Q12H Abebe Garzon DO   10 mL at 08/06/24 2050    sodium chloride 0.9 % flush 10 mL  10 mL Intravenous PRN Abebe Garzon,         sodium chloride 0.9 % flush 10 mL  10 mL Intravenous Q12H Lamine Figueroa APRN   10 mL at 08/06/24 2050    sodium chloride 0.9 % flush 10 mL  10 mL Intravenous PRN Lamine Figueroa APRN        sodium chloride 0.9 % infusion 40 mL  40 mL Intravenous PRN Abebe Garzon DO        sodium chloride 0.9 % infusion 40 mL  40 mL Intravenous PRN Lamine Figueroa APRN        sodium hypochlorite (DAKIN'S 1/4 STRENGTH) 0.125 % topical solution 0.125% solution   Topical Q12H Aby High APRN   Given at 08/06/24 1000        Physician Progress Notes (last 48 hours)        Stewart Alvarez APRN at 08/07/24 1007          Nephrology ([facility]) Progress Note      Patient:  Erick Luong  YOB: 1955  Date of Service: 8/7/2024  MRN: [id number]   Acct: [id number]   Primary Care Physician: Del Shetty MD  Advance Directive:   Code Status and Medical Interventions: CPR (Attempt to Resuscitate); Full Support   Ordered at: 08/01/24 0053     Level Of Support Discussed With:    Patient     Code Status (Patient has no pulse and is not breathing):    CPR (Attempt to Resuscitate)     Medical Interventions (Patient has pulse or is breathing):    Full Support     Admit Date: 7/31/2024       Hospital Day: 7  Referring Provider: Abebe Garzon,  DO      Patient personally seen and examined.  Complete chart including Consults, Notes, Operative Reports, Labs, Cardiology, and Radiology studies reviewed as able.        Subjective:  Erick Luong is a 69 y.o. male for whom we were consulted for evaluation and treatment of acute kidney injury. Baseline chronic kidney disease stage 3b. Baseline creatinine approximately 1.5-1.8. Follows in our office.  History of poorly controlled type 2 diabetes, hypertension, coronary artery disease, diabetic foot ulcer, obesity.  On 7/31 patient was found wandering at home, confused. Brought to ER for evaluation. Has a nonhealing left foot diabetic ulcer with serosanguineous drainage. Blood glucose level was >700 with A1c >12%. Initial creatinine was 2.67 and has steadily improved since he was admitted. Nephrology consulted on 8/01. Hospital course remarkable for improving renal function and improved blood glucose levels. Moved to medical floor    Today is awake and alert. No new complaints or new overnight issues. Urine output nonoliguric    Allergies:  Cefepime, Bactrim [sulfamethoxazole-trimethoprim], Vancomycin, Zolpidem, and Metronidazole    Home Meds:  Medications Prior to Admission   Medication Sig Dispense Refill Last Dose    ascorbic acid (VITAMIN C) 1000 MG tablet Take 1 tablet by mouth Daily. 30 tablet 3     bumetanide (BUMEX) 1 MG tablet Take 1 tablet by mouth 2 (Two) Times a Day for 30 days. 60 tablet 0     busPIRone (BUSPAR) 10 MG tablet Take 1 tablet by mouth 2 (Two) Times a Day.       calcitriol (ROCALTROL) 0.5 MCG capsule Take 1 capsule by mouth Daily. 90 capsule 4     carvedilol (COREG) 3.125 MG tablet Take 1 tablet twice a day by oral route. 60 tablet 4     donepezil (ARICEPT) 10 MG tablet Take 1 tablet by mouth Daily. 90 tablet 1     DULoxetine (CYMBALTA) 60 MG capsule Take 1 capsule by mouth Daily. 90 capsule 4     famotidine (PEPCID) 20 MG tablet Take 1 tablet twice a day by oral route. 180 tablet 4      Insulin Glargine (Lantus SoloStar) 100 UNIT/ML injection pen Inject 20 Units under the skin into the appropriate area as directed every night at bedtime. 15 mL 3     Insulin Regular Human, Conc, (HumuLIN R) 500 UNIT/ML solution pen-injector CONCENTRATED injection Inject 40 Units under the skin into the appropriate area as directed 2 (Two) Times a Day Before Meals. Inject 40 units under the skin in the the appropriate area with regular meals AND 40 units with large meals.       Iron-Vitamin C (Vitron-C)  MG tablet Take 1 tablet twice a day by oral route. 60 tablet 2     levocetirizine (XYZAL) 5 MG tablet Take 1 tablet by mouth every day at bedtime. 30 tablet 2     melatonin 3 MG tablet Take 2 tablets by mouth At Night As Needed for Sleep. 30 tablet 0     oxyCODONE (ROXICODONE) 10 MG tablet Take 1 tablet by mouth 2 (Two) Times a Day As Needed. Must last 30 days per md. 55 tablet 0     pantoprazole (PROTONIX) 40 MG EC tablet Take 1 tablet by mouth Every 12 (Twelve) Hours. 180 tablet 4     pregabalin (LYRICA) 100 MG capsule Take 1 capsule by mouth Daily.       pregabalin (LYRICA) 100 MG capsule Take 2 capsules by mouth every night at bedtime.       rosuvastatin (CRESTOR) 10 MG tablet Take 1 tablet by mouth Every Night. 30 tablet 2     sucralfate (CARAFATE) 1 g tablet Take 1 tablet by mouth 4 (Four) Times a Day before meals. 360 tablet 1     Diclofenac Sodium (VOLTAREN) 1 % gel gel Apply 2 g topically to the appropriate area as directed 4 (Four) Times a Day As Needed. 300 g 11     nitroglycerin (NITROSTAT) 0.4 MG SL tablet Place 1 tablet under the tongue Every 5 (Five) Minutes As Needed for Chest Pain. Take no more than 3 doses in 15 minutes.       polyethylene glycol (MIRALAX) 17 GM/SCOOP powder Take 17 g by mouth Daily As Needed (constipation).       sennosides-docusate (PERICOLACE) 8.6-50 MG per tablet Take 1 tablet by mouth Every Night. Obtain OTC          Medicines:  Current Facility-Administered Medications    Medication Dose Route Frequency Provider Last Rate Last Admin    ascorbic acid (VITAMIN C) tablet 1,000 mg  1,000 mg Oral Daily Reuben Garza APRN   1,000 mg at 08/06/24 0958    sennosides-docusate (PERICOLACE) 8.6-50 MG per tablet 2 tablet  2 tablet Oral BID Lamine Figueroa APRN   2 tablet at 08/06/24 2050    And    polyethylene glycol (MIRALAX) packet 17 g  17 g Oral Daily PRN Lamine Figueroa APRN        And    bisacodyl (DULCOLAX) EC tablet 5 mg  5 mg Oral Daily PRN Lamine Figueroa APRN        And    bisacodyl (DULCOLAX) suppository 10 mg  10 mg Rectal Daily PRN Lamine Figueroa APRN   10 mg at 08/02/24 0941    Calcium Replacement - Follow Nurse / BPA Driven Protocol   Does not apply PRN Abebe Garzon DO        dextrose (D50W) (25 g/50 mL) IV injection 10-50 mL  10-50 mL Intravenous Q15 Min PRN Abebe Garzon DO        dextrose (D50W) (25 g/50 mL) IV injection 25 g  25 g Intravenous Q15 Min PRN Lamine Figueroa APRN        dextrose (GLUTOSE) oral gel 15 g  15 g Oral Q15 Min PRN Lamine Figueroa APRN        donepezil (ARICEPT) tablet 10 mg  10 mg Oral Daily Reuben Garza APRN   10 mg at 08/06/24 0958    DULoxetine (CYMBALTA) DR capsule 60 mg  60 mg Oral Daily Reuben Garza APRN   60 mg at 08/06/24 0958    Enoxaparin Sodium (LOVENOX) syringe 135 mg  1 mg/kg Subcutaneous Q12H Abebe Garzon DO   135 mg at 08/06/24 2050    ertapenem (INVanz) 1,000 mg in sodium chloride 0.9 % 100 mL MBP  1,000 mg Intravenous Q24H Julia Adrian  mL/hr at 08/06/24 1516 1,000 mg at 08/06/24 1516    famotidine (PEPCID) tablet 20 mg  20 mg Oral Q12H Reuben Garza APRN   20 mg at 08/06/24 2050    ferric gluconate (FERRLECIT) 250 mg in sodium chloride 0.9 % 250 mL IVPB  250 mg Intravenous Daily Brian Lomas  mL/hr at 08/06/24 1213 250 mg at 08/06/24 1213    glucagon (GLUCAGEN) injection 1 mg  1 mg Intramuscular Q15 Min PRN Lamine Figueroa, SUSAN        insulin glargine (LANTUS,  SEMGLEE) injection 10 Units  10 Units Subcutaneous Daily Keren Jamison MD   10 Units at 08/06/24 0958    insulin regular (humuLIN R,novoLIN R) injection 3-14 Units  3-14 Units Subcutaneous 4x Daily AC & at Bedtime Keren Jamison MD   3 Units at 08/03/24 2033    Magnesium Standard Dose Replacement - Follow Nurse / BPA Driven Protocol   Does not apply PRN Abebe Garzon DO        melatonin tablet 2.5 mg  2.5 mg Oral Nightly Reuben Garza APRN   2.5 mg at 08/06/24 2050    nitroglycerin (NITROSTAT) SL tablet 0.4 mg  0.4 mg Sublingual Q5 Min PRN Lamine Figueroa APRN        ondansetron (ZOFRAN) injection 4 mg  4 mg Intravenous Q6H PRN Payam Keyes DO   4 mg at 08/05/24 0800    oxyCODONE (ROXICODONE) immediate release tablet 10 mg  10 mg Oral BID Alejandrina Barnett DO   10 mg at 08/06/24 2049    pantoprazole (PROTONIX) EC tablet 40 mg  40 mg Oral Q12H Reuben Garza APRN   40 mg at 08/06/24 2050    Pharmacy to Dose enoxaparin (LOVENOX)   Does not apply Continuous PRN Abebe Garzon DO        Phosphorus Replacement - Follow Nurse / BPA Driven Protocol   Does not apply PRN Abebe Garzon DO        polyethylene glycol (MIRALAX) packet 17 g  17 g Oral Daily Reuben Garza APRN   17 g at 08/06/24 0959    Potassium Replacement - Follow Nurse / BPA Driven Protocol   Does not apply PRN Abebe Garzon DO        pregabalin (LYRICA) capsule 100 mg  100 mg Oral Nightly Reuben Garza APRN   100 mg at 08/06/24 2050    pregabalin (LYRICA) capsule 50 mg  50 mg Oral Daily Reuben Garza APRN   50 mg at 08/06/24 0958    rosuvastatin (CRESTOR) tablet 10 mg  10 mg Oral Nightly Reuben Garza APRN   10 mg at 08/06/24 2050    sodium bicarbonate tablet 650 mg  650 mg Oral TID Brian Lomas MD   650 mg at 08/06/24 2050    sodium chloride 0.9 % flush 10 mL  10 mL Intravenous Q12H Abebe Garzon DO   10 mL at 08/06/24 2050    sodium chloride 0.9 % flush 10 mL  10 mL Intravenous PRN  Abebe Garzon, DO        sodium chloride 0.9 % flush 10 mL  10 mL Intravenous Q12H Lamine Figueroa APRN   10 mL at 08/06/24 2050    sodium chloride 0.9 % flush 10 mL  10 mL Intravenous PRN Lamine Figueroa APRN        sodium chloride 0.9 % infusion 40 mL  40 mL Intravenous PRN Abebe Garzon, DO        sodium chloride 0.9 % infusion 40 mL  40 mL Intravenous PRN Lamine Figueroa APRN        sodium hypochlorite (DAKIN'S 1/4 STRENGTH) 0.125 % topical solution 0.125% solution   Topical Q12H Aby High, SUSAN   Given at 08/06/24 1000       Past Medical History:  Past Medical History:   Diagnosis Date    Arthritis     Autonomic disease     CAD (coronary artery disease) 02/06/2017    Cervical radiculopathy 09/16/2021    Chronic constipation with acute exaccerbation 05/10/2021    Coronary artery disease     Degeneration of cervical intervertebral disc 08/11/2021    Diabetes mellitus     Diabetic foot ulcer 08/31/2020    Diabetic polyneuropathy associated with type 2 diabetes mellitus 01/18/2021    Elevated cholesterol     Gastroesophageal reflux disease 05/13/2019    Headache     HTN (hypertension), benign 05/03/2017    Hyperlipidemia     Hypertension     Mixed hyperlipidemia 02/07/2017    Multiple lung nodules 01/26/2020    5mm, 9 mm RLL identified 1/2020, not present 10/2019.    Myocardial infarction     Osteomyelitis 01/22/2020    Osteomyelitis of fifth toe of right foot 10/07/2019    Pancreatitis     Persistent insomnia 01/20/2020    Renal disorder     Sleep apnea 02/06/2017    Sleep apnea with use of continuous positive airway pressure (CPAP)     NON-COMPLIANT    Slow transit constipation 01/16/2019    Spinal stenosis in cervical region 09/16/2021    Vitamin D deficiency 03/02/2021       Past Surgical History:  Past Surgical History:   Procedure Laterality Date    ABDOMINAL SURGERY      AMPUTATION FOOT / TOE Left 10/2021    5th digit     ANTERIOR CERVICAL DISCECTOMY W/ FUSION N/A 08/05/2022     Procedure: CERVICAL DISCECTOMY ANTERIOR WITH FUSION C5-6 with possible plating of C5-7 with neuromonitoring and 1 c-arm;  Surgeon: Karel Soliz MD;  Location: [facility] OR;  Service: Neurosurgery;  Laterality: N/A;    APPENDECTOMY      BACK SURGERY      CARDIAC CATHETERIZATION Left 02/08/2021    Procedure: Left Heart Cath w poss intervention left anatomical snuff box acess;  Surgeon: Omkar Charles DO;  Location: [facility] CATH INVASIVE LOCATION;  Service: Cardiology;  Laterality: Left;    CARDIAC SURGERY      CATARACT EXTRACTION      CERVICAL SPINE SURGERY      COLONOSCOPY N/A 01/31/2017    Normal exam repeat in 5 years    COLONOSCOPY N/A 02/11/2019    Mild acute inflammation    COLONOSCOPY N/A 04/07/2024    2 areas at 10 and 20 cm with friability ulceration 2 clips placed at 20 cm and 4 clips at 10 cm poor prep normal mucosa,mild eroisions and ulcerations in visible vessels    COLONOSCOPY N/A 7/1/2024    Procedure: COLONOSCOPY WITH ANESTHESIA;  Surgeon: Arsalan Lorenzo DO;  Location: [facility] ENDOSCOPY;  Service: Gastroenterology;  Laterality: N/A;  pre op constipation/diarrhea  post poor prep  pcp Del Shetty    COLONOSCOPY N/A 7/2/2024    Procedure: COLONOSCOPY WITH ANESTHESIA;  Surgeon: Agapito Christopher MD;  Location: [facility] ENDOSCOPY;  Service: Gastroenterology;  Laterality: N/A;  pre rectal bleeding  post poor prep  pcp Del Shetty MD    COLONOSCOPY W/ POLYPECTOMY  03/04/2014    Hyperplastic polyp    CORONARY ARTERY BYPASS GRAFT  10/2015    ENDOSCOPY  04/13/2011    Gastritis with hemorrhage    ENDOSCOPY N/A 05/05/2017    Normal exam    ENDOSCOPY N/A 02/11/2019    Gastritis    ENDOSCOPY N/A 09/01/2020    Non-erosive gastritis with hemorrhage    ENDOSCOPY N/A 02/10/2021    Esophagitis    ENDOSCOPY N/A 04/11/2024    There were esophageal mucosal changes suspicious for short-segment Low's esophagus present in the distal esophagus. The maximum longitudinal extent of these mucosal changes  was 2 cm in length. Mucosa was biopsied with a cold forceps for histologyDistal esophagus, biopsies: Mild chronic active esophagogastritis. No evidence of intestinal metaplasia, dysplasia. Antrum, bx, Mild chronic gastritis    FOOT SURGERY Left     INCISION AND DRAINAGE OF WOUND Left 2007    spider bite       Family History  Family History   Problem Relation Age of Onset    Colon cancer Father     Heart disease Father     Colon cancer Sister     Colon polyps Sister     Alzheimer's disease Mother     Coronary artery disease Sister     Coronary artery disease Sister        Social History  Social History     Socioeconomic History    Marital status:    Tobacco Use    Smoking status: Former     Current packs/day: 0.00     Types: Cigarettes     Quit date:      Years since quittin.6    Smokeless tobacco: Never    Tobacco comments:     smoked in [place]   Vaping Use    Vaping status: Never Used   Substance and Sexual Activity    Alcohol use: No    Drug use: No    Sexual activity: Defer       Review of Systems:  History obtained from chart review and the patient  General ROS: No fever or chills  Respiratory ROS: No cough, shortness of breath, wheezing  Cardiovascular ROS: No chest pain or palpitations  Gastrointestinal ROS: No abdominal pain or melena  Genito-Urinary ROS: No dysuria or hematuria  Psych ROS: No anxiety and depression  14 point ROS reviewed with the patient and negative except as noted above and in the HPI unless unable to obtain.    Objective:  Patient Vitals for the past 24 hrs:   BP Temp Temp src Pulse Resp SpO2 Weight   24 0837 146/76 98.6 °F (37 °C) Oral 74 16 96 % --   24 0525 -- -- -- -- -- -- 133 kg (293 lb 3.2 oz)   24 0315 139/77 98.2 °F (36.8 °C) Oral 67 16 95 % --   24 1930 142/61 98.3 °F (36.8 °C) Oral 67 16 96 % --   24 1616 161/71 98.1 °F (36.7 °C) Oral 97 18 97 % --   24 1151 169/95 98.5 °F (36.9 °C) Oral 74 17 98 % --     No intake or  output data in the 24 hours ending 08/07/24 1007    General: awake/alert   Chest:  clear to auscultation bilaterally without respiratory distress  CVS: regular rate and rhythm  Abdominal: soft, nontender, positive bowel sounds  Extremities: no cyanosis or edema  Skin:  left foot ulcer and warm and dry without rash      Labs:  Results from last 7 days   Lab Units 08/05/24  0444 08/04/24  0615 08/03/24  0605   WBC 10*3/mm3 5.89 5.73 5.75   HEMOGLOBIN g/dL 9.5* 9.5* 10.3*   HEMATOCRIT % 30.1* 30.5* 33.2*   PLATELETS 10*3/mm3 210 201 215         Results from last 7 days   Lab Units 08/07/24  0758 08/06/24  0508 08/05/24 0444 08/04/24  0615 08/03/24  0605   SODIUM mmol/L 144 143 144 145 143   POTASSIUM mmol/L 3.9 4.1 3.9 3.9 3.8   CHLORIDE mmol/L 110* 109* 112* 111* 110*   CO2 mmol/L 20.0* 22.0 21.0* 21.0* 19.0*   BUN mg/dL 15 18 20 24* 31*   CREATININE mg/dL 1.50* 1.47* 1.47* 1.60* 1.82*   CALCIUM mg/dL 8.6 8.4* 8.3* 8.5* 8.3*   EGFR mL/min/1.73 50.4* 51.6* 51.6* 46.6* 40.0*   BILIRUBIN mg/dL  --   --  0.3 0.3 0.3   ALK PHOS U/L  --   --  102 95 88   ALT (SGPT) U/L  --   --  9 9 7   AST (SGOT) U/L  --   --  16 15 12   GLUCOSE mg/dL 97 101* 89 97 125*       Radiology:   Imaging Results (Last 72 Hours)       ** No results found for the last 72 hours. **            Culture:  Blood Culture   Date Value Ref Range Status   07/31/2024 No growth at 24 hours  Preliminary   07/31/2024 Staphylococcus, coagulase negative (C)  Final         Assessment    Acute kidney injury, ATN--resolving  Baseline chronic kidney disease stage 3b  Type 2 diabetes  Hypertension  Sepsis related to diabetic foot ulcer  Anemia of CKD  Obesity     Plan:   Encourage compliance with medications and diabetic diet  Renal function stable   Monitor labs      SUSAN Calvert  8/7/2024  10:07 CDT      Electronically signed by Stewart Alvarez APRN at 08/07/24 1008       Alejandrina Barnett DO at 08/06/24 1140              [facility]  [facility] Medicine Services  INPATIENT PROGRESS NOTE    Patient Name: Erick Luong  Date of Admission: 7/31/2024  Today's Date: 08/06/24  Length of Stay: 6  Primary Care Physician: Del Shetty MD    Subjective   Chief Complaint: Continues to complain of weakness.     HPI   Patient notes he had not walked today at time of examination.  Continues on IV antibiotic.   Patient acknowlegdes that he can't take care of himself.  Wife is in hospital .    Discussed that it would be in his best interest to go to LTACH for continued antibiotic and therapy if he would qualify.  He verbalized agreement for me to make order for LTACH referral.     Patient denied pain at time of exam.        Review of Systems   All pertinent negatives and positives are as above. All other systems have been reviewed and are negative unless otherwise stated.     Objective    Temp:  [97.4 °F (36.3 °C)-98.3 °F (36.8 °C)] 97.5 °F (36.4 °C)  Heart Rate:  [69-77] 73  Resp:  [16-18] 18  BP: (125-170)/(52-86) 170/86  Physical Exam  Vitals and nursing note reviewed.   Constitutional:       Appearance: He is obese.   HENT:      Head: Normocephalic and atraumatic.      Right Ear: External ear normal.      Left Ear: External ear normal.      Nose: Nose normal.      Mouth/Throat:      Mouth: Mucous membranes are moist.   Eyes:      Extraocular Movements: Extraocular movements intact.      Conjunctiva/sclera: Conjunctivae normal.      Pupils: Pupils are equal, round, and reactive to light.   Cardiovascular:      Rate and Rhythm: Normal rate and regular rhythm.      Pulses: Normal pulses.      Heart sounds: No murmur heard.     No friction rub. No gallop.   Pulmonary:      Effort: Pulmonary effort is normal.      Breath sounds: Normal breath sounds.   Abdominal:      General: Bowel sounds are normal.      Palpations: Abdomen is soft.   Musculoskeletal:      Cervical back: Normal range of motion and neck supple.      Comments: Moves all  extremities.  Dressing to left heel ulcer intact.   Skin:     General: Skin is warm and dry.      Capillary Refill: Capillary refill takes less than 2 seconds.   Neurological:      General: No focal deficit present.      Mental Status: He is alert and oriented to person, place, and time.      Cranial Nerves: No cranial nerve deficit.   Psychiatric:         Behavior: Behavior normal.      Comments: Mood is apathetic             Results Review:  I have reviewed the labs, radiology results, and diagnostic studies.    Laboratory Data:   Results from last 7 days   Lab Units 08/05/24 0444 08/04/24  0615 08/03/24  0605   WBC 10*3/mm3 5.89 5.73 5.75   HEMOGLOBIN g/dL 9.5* 9.5* 10.3*   HEMATOCRIT % 30.1* 30.5* 33.2*   PLATELETS 10*3/mm3 210 201 215        Results from last 7 days   Lab Units 08/06/24  0508 08/05/24 0444 08/04/24  0615 08/03/24  0605   SODIUM mmol/L 143 144 145 143   POTASSIUM mmol/L 4.1 3.9 3.9 3.8   CHLORIDE mmol/L 109* 112* 111* 110*   CO2 mmol/L 22.0 21.0* 21.0* 19.0*   BUN mg/dL 18 20 24* 31*   CREATININE mg/dL 1.47* 1.47* 1.60* 1.82*   CALCIUM mg/dL 8.4* 8.3* 8.5* 8.3*   BILIRUBIN mg/dL  --  0.3 0.3 0.3   ALK PHOS U/L  --  102 95 88   ALT (SGPT) U/L  --  9 9 7   AST (SGOT) U/L  --  16 15 12   GLUCOSE mg/dL 101* 89 97 125*       Culture Data:   Blood Culture   Date Value Ref Range Status   07/31/2024 No growth at 5 days  Final   07/31/2024 Staphylococcus, coagulase negative (C)  Final     Urine Culture   Date Value Ref Range Status   07/31/2024 >100,000 CFU/mL Escherichia coli ESBL (A)  Final     Wound Culture   Date Value Ref Range Status   08/02/2024 (A)  Final    Light growth (2+) Streptococcus agalactiae (Group B)     Comment:       This organism is considered to be universally susceptible to penicillin.  No further antibiotic testing will be performed. If Clindamycin or Erythromycin is the drug of choice, notify the laboratory within 7 days to request susceptibility testing.   08/02/2024 (A)   Final    Light growth (2+) Streptococcus agalactiae (Group B)     Comment:       This organism is considered to be universally susceptible to penicillin.  No further antibiotic testing will be performed. If Clindamycin or Erythromycin is the drug of choice, notify the laboratory within 7 days to request susceptibility testing.       Radiology Data:   Imaging Results (Last 24 Hours)       ** No results found for the last 24 hours. **            I have reviewed the patient's current medications.     Assessment/Plan   Assessment  Active Hospital Problems    Diagnosis     **DKA, type 2, not at goal     E. coli UTI, ESBL     Atrial fibrillation     Chronic heart failure with preserved ejection fraction (HFpEF)     Chronic diastolic heart failure     GERD without esophagitis     Diabetic ulcer of left foot associated with type 2 diabetes mellitus        Treatment Plan  LTACH referral  Continue antibiotics  Encourage out of bed/ambulate.   Continue to monitor glucose.     Medical Decision Making  Number and Complexity of problems:   3 acute, high complexity problems  4+ chronic, moderate complexity problems     Differential Diagnosis: None     Conditions and Status        Condition is improving.     MDM Data  External documents reviewed: Care Everywhere  Cardiac tracing (EKG, telemetry) interpretation: See HPI  Radiology interpretation: See HPI  Labs reviewed: See HPI  Any tests that were considered but not ordered: None     Decision rules/scores evaluated (example [id number], Wells, etc): None     Discussed with: The patient     Care Planning  Shared decision making: Patient  Code status and discussions: Full code     Disposition  Social Determinants of Health that impact treatment or disposition: none     I expect the patient to be discharged to ?LTACH  in 1-2 days. .     Electronically signed by Alejandrina Barnett DO, 08/06/24, 11:40 CDT.      Electronically signed by Alejandrina Barnett DO at 08/06/24 9953        Stewart Alvarez, APRN at 08/06/24 1007       Attestation signed by Willy Arteaga MD at 08/06/24 1907    I have independently interviewed and examined the patient and reviewed the laboratory, imaging, notes and all other records as available.  I have discussed key elements of the care plan with the APRN and agree with the findings and care plan documented above except as noted.      Subjective:  Initially acute kidney injury. Baseline chronic kidney disease stage 3b. Baseline creatinine approximately 1.5-1.8. Follows in our office.  History of poorly controlled type 2 diabetes, hypertension, coronary artery disease, diabetic foot ulcer, obesity.  On 7/31, patient was found wandering at home confused.  He was brought to ER for evaluation. He has a nonhealing left foot diabetic ulcer with serosanguineous drainage. Blood glucose level was >700 with A1c >12%. Initial creatinine was 2.67 and has steadily improved since he was admitted. Nephrology consulted on 8/1. Hospital course remarkable for improving renal function and improved blood glucose levels. Moved to medical floor after stabilization.    Today, no overnight events.  Hopeful for discharge soon.  Denied current chest pain, shortness of air at rest, nausea or vomiting.  Laying in bed.    Objective:  Vitals/labs/studies/notes all reviewed  Exam independently verified with additional comments or findings as noted:  Ext: Minimal lower extremity edema    Assessment:  Acute kidney injury/ATN  Chronic kidney disease stage IIIb  Hypertension  Diabetes type 2  Diabetic foot ulcer with sepsis  Anemia chronic kidney disease  Obesity  Metabolic acidosis    Plan:  Discussed with patient, nursing  Workup reviewed today  Monitor labs  Wean IV fluids  ID evaluation reviewed   Antibiotics per ID    Willy Arteaga MD  8/6/2024  19:06 CDT        This documentation may indicate a split shared visit as defined in CMS Final rule (CY) 2024 Physician Fee  Schedule dated 11/2/2023: for Medicare billing purposes, the “substantive portion” means more than half of the total time spent by the physician or nonphysician practitioner performing the split (or shared) visit, or a substantive part of the medical decision making.  This visit involved face to face encounters with the patient by both providers on the date of the visit.  Time spent is indicated to identify the substantive portion of the visit, whereas the level of service may be determined by complexity of medical decision making.                      Nephrology ([facility]) Progress Note      Patient:  Erick Luong  YOB: 1955  Date of Service: 8/6/2024  MRN: [id number]   Acct: [id number]   Primary Care Physician: Del Shetty MD  Advance Directive:   Code Status and Medical Interventions: CPR (Attempt to Resuscitate); Full Support   Ordered at: 08/01/24 0053     Level Of Support Discussed With:    Patient     Code Status (Patient has no pulse and is not breathing):    CPR (Attempt to Resuscitate)     Medical Interventions (Patient has pulse or is breathing):    Full Support     Admit Date: 7/31/2024       Hospital Day: 6  Referring Provider: Abebe Garzon,       Patient personally seen and examined.  Complete chart including Consults, Notes, Operative Reports, Labs, Cardiology, and Radiology studies reviewed as able.        Subjective:  Erick Luong is a 69 y.o. male for whom we were consulted for evaluation and treatment of acute kidney injury. Baseline chronic kidney disease stage 3b. Baseline creatinine approximately 1.5-1.8. Follows in our office.  History of poorly controlled type 2 diabetes, hypertension, coronary artery disease, diabetic foot ulcer, obesity.  On 7/31 patient was found wandering at home, confused. Brought to ER for evaluation. Has a nonhealing left foot diabetic ulcer with serosanguineous drainage. Blood glucose level was >700 with A1c  >12%. Initial creatinine was 2.67 and has steadily improved since he was admitted. Nephrology consulted on 8/01. Hospital course remarkable for improving renal function and improved blood glucose levels. Moved to medical floor    Today is awake and alert. No new complaint. No new overnight issues. Urine output nonoliguric    Allergies:  Cefepime, Bactrim [sulfamethoxazole-trimethoprim], Vancomycin, Zolpidem, and Metronidazole    Home Meds:  Medications Prior to Admission   Medication Sig Dispense Refill Last Dose    ascorbic acid (VITAMIN C) 1000 MG tablet Take 1 tablet by mouth Daily. 30 tablet 3     bumetanide (BUMEX) 1 MG tablet Take 1 tablet by mouth 2 (Two) Times a Day for 30 days. 60 tablet 0     busPIRone (BUSPAR) 10 MG tablet Take 1 tablet by mouth 2 (Two) Times a Day.       calcitriol (ROCALTROL) 0.5 MCG capsule Take 1 capsule by mouth Daily. 90 capsule 4     carvedilol (COREG) 3.125 MG tablet Take 1 tablet twice a day by oral route. 60 tablet 4     donepezil (ARICEPT) 10 MG tablet Take 1 tablet by mouth Daily. 90 tablet 1     DULoxetine (CYMBALTA) 60 MG capsule Take 1 capsule by mouth Daily. 90 capsule 4     famotidine (PEPCID) 20 MG tablet Take 1 tablet twice a day by oral route. 180 tablet 4     Insulin Glargine (Lantus SoloStar) 100 UNIT/ML injection pen Inject 20 Units under the skin into the appropriate area as directed every night at bedtime. 15 mL 3     Insulin Regular Human, Conc, (HumuLIN R) 500 UNIT/ML solution pen-injector CONCENTRATED injection Inject 40 Units under the skin into the appropriate area as directed 2 (Two) Times a Day Before Meals. Inject 40 units under the skin in the the appropriate area with regular meals AND 40 units with large meals.       Iron-Vitamin C (Vitron-C)  MG tablet Take 1 tablet twice a day by oral route. 60 tablet 2     levocetirizine (XYZAL) 5 MG tablet Take 1 tablet by mouth every day at bedtime. 30 tablet 2     melatonin 3 MG tablet Take 2 tablets by  mouth At Night As Needed for Sleep. 30 tablet 0     oxyCODONE (ROXICODONE) 10 MG tablet Take 1 tablet by mouth 2 (Two) Times a Day As Needed. Must last 30 days per md. 55 tablet 0     pantoprazole (PROTONIX) 40 MG EC tablet Take 1 tablet by mouth Every 12 (Twelve) Hours. 180 tablet 4     pregabalin (LYRICA) 100 MG capsule Take 1 capsule by mouth Daily.       pregabalin (LYRICA) 100 MG capsule Take 2 capsules by mouth every night at bedtime.       rosuvastatin (CRESTOR) 10 MG tablet Take 1 tablet by mouth Every Night. 30 tablet 2     sucralfate (CARAFATE) 1 g tablet Take 1 tablet by mouth 4 (Four) Times a Day before meals. 360 tablet 1     Diclofenac Sodium (VOLTAREN) 1 % gel gel Apply 2 g topically to the appropriate area as directed 4 (Four) Times a Day As Needed. 300 g 11     nitroglycerin (NITROSTAT) 0.4 MG SL tablet Place 1 tablet under the tongue Every 5 (Five) Minutes As Needed for Chest Pain. Take no more than 3 doses in 15 minutes.       polyethylene glycol (MIRALAX) 17 GM/SCOOP powder Take 17 g by mouth Daily As Needed (constipation).       sennosides-docusate (PERICOLACE) 8.6-50 MG per tablet Take 1 tablet by mouth Every Night. Obtain OTC          Medicines:  Current Facility-Administered Medications   Medication Dose Route Frequency Provider Last Rate Last Admin    ascorbic acid (VITAMIN C) tablet 1,000 mg  1,000 mg Oral Daily Reuben Garza APRN   1,000 mg at 08/06/24 0958    sennosides-docusate (PERICOLACE) 8.6-50 MG per tablet 2 tablet  2 tablet Oral BID Laimne Figueroa APRN   2 tablet at 08/06/24 0958    And    polyethylene glycol (MIRALAX) packet 17 g  17 g Oral Daily PRN Lamine Figueroa APRN        And    bisacodyl (DULCOLAX) EC tablet 5 mg  5 mg Oral Daily PRN Lamine Figueroa APRN        And    bisacodyl (DULCOLAX) suppository 10 mg  10 mg Rectal Daily PRN Lamine Figueroa APRN   10 mg at 08/02/24 0941    Calcium Replacement - Follow Nurse / BPA Driven Protocol   Does not apply PRN Duran  Abebe Acevedo DO        dextrose (D50W) (25 g/50 mL) IV injection 10-50 mL  10-50 mL Intravenous Q15 Min PRN Abebe Garzon DO        dextrose (D50W) (25 g/50 mL) IV injection 25 g  25 g Intravenous Q15 Min PRN Lamine Figueroa APRN        dextrose (GLUTOSE) oral gel 15 g  15 g Oral Q15 Min PRN Lamine Figueroa APRN        donepezil (ARICEPT) tablet 10 mg  10 mg Oral Daily Reuben Garza APRN   10 mg at 08/06/24 0958    DULoxetine (CYMBALTA) DR capsule 60 mg  60 mg Oral Daily Reuben Garza APRN   60 mg at 08/06/24 0958    Enoxaparin Sodium (LOVENOX) syringe 135 mg  1 mg/kg Subcutaneous Q12H Abebe Garzon DO   135 mg at 08/06/24 0958    ertapenem (INVanz) 1,000 mg in sodium chloride 0.9 % 100 mL MBP  1,000 mg Intravenous Q24H Julia Adrian  mL/hr at 08/05/24 1419 1,000 mg at 08/05/24 1419    famotidine (PEPCID) tablet 20 mg  20 mg Oral Q12H Reuben Garza APRN   20 mg at 08/06/24 1000    ferric gluconate (FERRLECIT) 250 mg in sodium chloride 0.9 % 250 mL IVPB  250 mg Intravenous Daily Brian Lomas  mL/hr at 08/05/24 0833 250 mg at 08/05/24 0833    glucagon (GLUCAGEN) injection 1 mg  1 mg Intramuscular Q15 Min PRN Lamine Figueroa APRN        insulin glargine (LANTUS, SEMGLEE) injection 10 Units  10 Units Subcutaneous Daily Keren Jamison MD   10 Units at 08/06/24 0958    insulin regular (humuLIN R,novoLIN R) injection 3-14 Units  3-14 Units Subcutaneous 4x Daily AC & at Bedtime Keren Jamison MD   3 Units at 08/03/24 2033    Magnesium Standard Dose Replacement - Follow Nurse / BPA Driven Protocol   Does not apply PRN Abebe Garzon DO        melatonin tablet 2.5 mg  2.5 mg Oral Nightly Reuben Garza APRN   2.5 mg at 08/05/24 2226    nitroglycerin (NITROSTAT) SL tablet 0.4 mg  0.4 mg Sublingual Q5 Min PRN Lamine Figueroa APRN        ondansetron (ZOFRAN) injection 4 mg  4 mg Intravenous Q6H PRN Payam Keyes DO   4 mg at 08/05/24 0800    pantoprazole  (PROTONIX) EC tablet 40 mg  40 mg Oral Q12H Reuben Garza APRN   40 mg at 08/06/24 0958    Pharmacy to Dose enoxaparin (LOVENOX)   Does not apply Continuous PRN Abebe Garzon DO        Phosphorus Replacement - Follow Nurse / BPA Driven Protocol   Does not apply PRN Abebe Garzon DO        polyethylene glycol (MIRALAX) packet 17 g  17 g Oral Daily Reuben Garza APRN   17 g at 08/06/24 0959    Potassium Replacement - Follow Nurse / BPA Driven Protocol   Does not apply PRN Abebe Garzon DO        pregabalin (LYRICA) capsule 100 mg  100 mg Oral Nightly Reuben Garza APRN   100 mg at 08/05/24 2226    pregabalin (LYRICA) capsule 50 mg  50 mg Oral Daily Reuben Garza APRN   50 mg at 08/06/24 0958    rosuvastatin (CRESTOR) tablet 10 mg  10 mg Oral Nightly Reuben Garza APRN   10 mg at 08/05/24 2232    sodium bicarbonate tablet 650 mg  650 mg Oral TID Brian Lomas MD   650 mg at 08/06/24 0958    sodium chloride 0.9 % flush 10 mL  10 mL Intravenous Q12H Abebe Garzon DO   10 mL at 08/05/24 2233    sodium chloride 0.9 % flush 10 mL  10 mL Intravenous PRN Abebe Garzon DO        sodium chloride 0.9 % flush 10 mL  10 mL Intravenous Q12H Lamine Figueroa APRN   10 mL at 08/05/24 2232    sodium chloride 0.9 % flush 10 mL  10 mL Intravenous PRN Lamine Figueroa APRN        sodium chloride 0.9 % infusion 40 mL  40 mL Intravenous PRN Abebe Garzon DO        sodium chloride 0.9 % infusion 40 mL  40 mL Intravenous PRN Lamine Figueroa APRN        sodium chloride 0.9 % infusion  50 mL/hr Intravenous Continuous Brian Lomas MD   Stopped at 08/04/24 2235    sodium hypochlorite (DAKIN'S 1/4 STRENGTH) 0.125 % topical solution 0.125% solution   Topical Q12H Aby High APRN   Given at 08/06/24 1000       Past Medical History:  Past Medical History:   Diagnosis Date    Arthritis     Autonomic disease     CAD (coronary artery disease) 02/06/2017    Cervical radiculopathy  09/16/2021    Chronic constipation with acute exaccerbation 05/10/2021    Coronary artery disease     Degeneration of cervical intervertebral disc 08/11/2021    Diabetes mellitus     Diabetic foot ulcer 08/31/2020    Diabetic polyneuropathy associated with type 2 diabetes mellitus 01/18/2021    Elevated cholesterol     Gastroesophageal reflux disease 05/13/2019    Headache     HTN (hypertension), benign 05/03/2017    Hyperlipidemia     Hypertension     Mixed hyperlipidemia 02/07/2017    Multiple lung nodules 01/26/2020    5mm, 9 mm RLL identified 1/2020, not present 10/2019.    Myocardial infarction     Osteomyelitis 01/22/2020    Osteomyelitis of fifth toe of right foot 10/07/2019    Pancreatitis     Persistent insomnia 01/20/2020    Renal disorder     Sleep apnea 02/06/2017    Sleep apnea with use of continuous positive airway pressure (CPAP)     NON-COMPLIANT    Slow transit constipation 01/16/2019    Spinal stenosis in cervical region 09/16/2021    Vitamin D deficiency 03/02/2021       Past Surgical History:  Past Surgical History:   Procedure Laterality Date    ABDOMINAL SURGERY      AMPUTATION FOOT / TOE Left 10/2021    5th digit     ANTERIOR CERVICAL DISCECTOMY W/ FUSION N/A 08/05/2022    Procedure: CERVICAL DISCECTOMY ANTERIOR WITH FUSION C5-6 with possible plating of C5-7 with neuromonitoring and 1 c-arm;  Surgeon: Karel Soliz MD;  Location:  [facility] OR;  Service: Neurosurgery;  Laterality: N/A;    APPENDECTOMY      BACK SURGERY      CARDIAC CATHETERIZATION Left 02/08/2021    Procedure: Left Heart Cath w poss intervention left anatomical snuff box acess;  Surgeon: Omkar Charles DO;  Location:  [facility] CATH INVASIVE LOCATION;  Service: Cardiology;  Laterality: Left;    CARDIAC SURGERY      CATARACT EXTRACTION      CERVICAL SPINE SURGERY      COLONOSCOPY N/A 01/31/2017    Normal exam repeat in 5 years    COLONOSCOPY N/A 02/11/2019    Mild acute inflammation    COLONOSCOPY N/A 04/07/2024    2  areas at 10 and 20 cm with friability ulceration 2 clips placed at 20 cm and 4 clips at 10 cm poor prep normal mucosa,mild eroisions and ulcerations in visible vessels    COLONOSCOPY N/A 7/1/2024    Procedure: COLONOSCOPY WITH ANESTHESIA;  Surgeon: Arsalan Lorenzo DO;  Location: [facility] ENDOSCOPY;  Service: Gastroenterology;  Laterality: N/A;  pre op constipation/diarrhea  post poor prep  pcp Del Shetty    COLONOSCOPY N/A 7/2/2024    Procedure: COLONOSCOPY WITH ANESTHESIA;  Surgeon: Agapito Christopher MD;  Location: [facility] ENDOSCOPY;  Service: Gastroenterology;  Laterality: N/A;  pre rectal bleeding  post poor prep  pcp Del Shetty MD    COLONOSCOPY W/ POLYPECTOMY  03/04/2014    Hyperplastic polyp    CORONARY ARTERY BYPASS GRAFT  10/2015    ENDOSCOPY  04/13/2011    Gastritis with hemorrhage    ENDOSCOPY N/A 05/05/2017    Normal exam    ENDOSCOPY N/A 02/11/2019    Gastritis    ENDOSCOPY N/A 09/01/2020    Non-erosive gastritis with hemorrhage    ENDOSCOPY N/A 02/10/2021    Esophagitis    ENDOSCOPY N/A 04/11/2024    There were esophageal mucosal changes suspicious for short-segment Low's esophagus present in the distal esophagus. The maximum longitudinal extent of these mucosal changes was 2 cm in length. Mucosa was biopsied with a cold forceps for histologyDistal esophagus, biopsies: Mild chronic active esophagogastritis. No evidence of intestinal metaplasia, dysplasia. Antrum, bx, Mild chronic gastritis    FOOT SURGERY Left     INCISION AND DRAINAGE OF WOUND Left 09/2007    spider bite       Family History  Family History   Problem Relation Age of Onset    Colon cancer Father     Heart disease Father     Colon cancer Sister     Colon polyps Sister     Alzheimer's disease Mother     Coronary artery disease Sister     Coronary artery disease Sister        Social History  Social History     Socioeconomic History    Marital status:    Tobacco Use    Smoking status: Former     Current packs/day: 0.00      Types: Cigarettes     Quit date:      Years since quittin.6    Smokeless tobacco: Never    Tobacco comments:     smoked in highschool   Vaping Use    Vaping status: Never Used   Substance and Sexual Activity    Alcohol use: No    Drug use: No    Sexual activity: Defer       Review of Systems:  History obtained from chart review and the patient  General ROS: No fever or chills  Respiratory ROS: No cough, shortness of breath, wheezing  Cardiovascular ROS: No chest pain or palpitations  Gastrointestinal ROS: No abdominal pain or melena  Genito-Urinary ROS: No dysuria or hematuria  Psych ROS: No anxiety and depression  14 point ROS reviewed with the patient and negative except as noted above and in the HPI unless unable to obtain.    Objective:  Patient Vitals for the past 24 hrs:   BP Temp Temp src Pulse Resp SpO2 Weight   24 0802 170/86 97.5 °F (36.4 °C) Oral 73 18 96 % --   24 0630 -- -- -- -- -- -- 132 kg (291 lb 6.4 oz)   24 0408 134/59 97.5 °F (36.4 °C) Oral 73 18 94 % --   24 2350 125/52 98.3 °F (36.8 °C) Oral 73 18 95 % --   24 2005 151/65 97.8 °F (36.6 °C) Oral 77 16 94 % --   24 1617 152/82 97.5 °F (36.4 °C) Oral 72 16 98 % --   24 1159 155/76 97.4 °F (36.3 °C) Oral 69 16 97 % --       Intake/Output Summary (Last 24 hours) at 2024 1007  Last data filed at 2024 0408  Gross per 24 hour   Intake 240 ml   Output 700 ml   Net -460 ml     General: awake/alert   Chest:  clear to auscultation bilaterally without respiratory distress  CVS: regular rate and rhythm  Abdominal: soft, nontender, positive bowel sounds  Extremities: no cyanosis or edema  Skin:  left foot ulcer and warm and dry without rash      Labs:  Results from last 7 days   Lab Units 24  0444 24  0615 24  0605   WBC 10*3/mm3 5.89 5.73 5.75   HEMOGLOBIN g/dL 9.5* 9.5* 10.3*   HEMATOCRIT % 30.1* 30.5* 33.2*   PLATELETS 10*3/mm3 210 201 215         Results from last 7 days  "  Lab Units 08/06/24  0508 08/05/24  0444 08/04/24  0615 08/03/24  0605   SODIUM mmol/L 143 144 145 143   POTASSIUM mmol/L 4.1 3.9 3.9 3.8   CHLORIDE mmol/L 109* 112* 111* 110*   CO2 mmol/L 22.0 21.0* 21.0* 19.0*   BUN mg/dL 18 20 24* 31*   CREATININE mg/dL 1.47* 1.47* 1.60* 1.82*   CALCIUM mg/dL 8.4* 8.3* 8.5* 8.3*   EGFR mL/min/1.73 51.6* 51.6* 46.6* 40.0*   BILIRUBIN mg/dL  --  0.3 0.3 0.3   ALK PHOS U/L  --  102 95 88   ALT (SGPT) U/L  --  9 9 7   AST (SGOT) U/L  --  16 15 12   GLUCOSE mg/dL 101* 89 97 125*       Radiology:   Imaging Results (Last 72 Hours)       ** No results found for the last 72 hours. **            Culture:  Blood Culture   Date Value Ref Range Status   07/31/2024 No growth at 24 hours  Preliminary   07/31/2024 Staphylococcus, coagulase negative (C)  Final         Assessment    Acute kidney injury, ATN--resolving  Baseline chronic kidney disease stage 3b  Type 2 diabetes  Hypertension  Sepsis related to diabetic foot ulcer  Anemia of CKD  Obesity     Plan:   Encourage PO fluids  Renal function stable at baseline level  Monitor labs      Stewart Alvarez, SUSAN  8/6/2024  10:07 CDT      Electronically signed by Willy Arteaga MD at 08/06/24 1907       Julia Adrian MD at 08/06/24 0842          INFECTIOUS DISEASES PROGRESS NOTE    Patient:  Erick Luong  YOB: 1955  MRN: [id number]   Admit date: 7/31/2024   Admitting Physician: Alejandrina Barnett DO  Primary Care Physician: Del Shetty MD    Chief Complaint: None offered      Interval History: Patient offers no complaints.  He says he is wearing his offloading shoe when he is up to ambulate.      Denies rash or diarrhea    Allergies:   Allergies   Allergen Reactions    Cefepime Hives and Anaphylaxis    Bactrim [Sulfamethoxazole-Trimethoprim] Other (See Comments)     \"RENAL FAILURE\"    Vancomycin Itching    Zolpidem Mental Status Change     \"makes him crazy\"    Metronidazole Rash       Current " "Scheduled Medications:   ascorbic acid, 1,000 mg, Oral, Daily  donepezil, 10 mg, Oral, Daily  DULoxetine, 60 mg, Oral, Daily  enoxaparin, 1 mg/kg, Subcutaneous, Q12H  ertapenem, 1,000 mg, Intravenous, Q24H  famotidine, 20 mg, Oral, Q12H  ferric gluconate, 250 mg, Intravenous, Daily  insulin glargine, 10 Units, Subcutaneous, Daily  insulin regular, 3-14 Units, Subcutaneous, 4x Daily AC & at Bedtime  melatonin, 2.5 mg, Oral, Nightly  oxyCODONE, 10 mg, Oral, BID  pantoprazole, 40 mg, Oral, Q12H  polyethylene glycol, 17 g, Oral, Daily  pregabalin, 100 mg, Oral, Nightly  pregabalin, 50 mg, Oral, Daily  rosuvastatin, 10 mg, Oral, Nightly  senna-docusate sodium, 2 tablet, Oral, BID  sodium bicarbonate, 650 mg, Oral, TID  sodium chloride, 10 mL, Intravenous, Q12H  sodium chloride, 10 mL, Intravenous, Q12H  sodium hypochlorite, , Topical, Q12H      Current PRN Medications:    senna-docusate sodium **AND** polyethylene glycol **AND** bisacodyl **AND** bisacodyl    Calcium Replacement - Follow Nurse / BPA Driven Protocol    dextrose    dextrose    dextrose    glucagon (human recombinant)    Magnesium Standard Dose Replacement - Follow Nurse / BPA Driven Protocol    nitroglycerin    ondansetron    Pharmacy to Dose enoxaparin (LOVENOX)    Phosphorus Replacement - Follow Nurse / BPA Driven Protocol    Potassium Replacement - Follow Nurse / BPA Driven Protocol    sodium chloride    sodium chloride    sodium chloride    sodium chloride    Pharmacy to Dose enoxaparin (LOVENOX),   sodium chloride, 50 mL/hr, Last Rate: Stopped (24)           Objective     Vital Signs:  Temp (24hrs), Av.7 °F (36.5 °C), Min:97.4 °F (36.3 °C), Max:98.3 °F (36.8 °C)      /86 (BP Location: Right arm, Patient Position: Lying)   Pulse 73   Temp 97.5 °F (36.4 °C) (Oral)   Resp 18   Ht 182.9 cm (72\")   Wt 132 kg (291 lb 6.4 oz)   SpO2 96%   BMI 39.52 kg/m²         Physical Exam:    General: Patient is an obese male lying in bed in " no acute distress  Respiratory: Effort even and unlabored, he was not conversationally dyspneic  Left foot dressing in place with sock over it.  Sock was partially removed.  Packing is in place involving the left heel.  Lateral Betadine soaked dressing in place as well.  No fluctuance or induration appreciated in the left lateral portion of the foot      Results Review:    I reviewed the patient's new clinical results.    Lab Results:    CBC:   Lab Results   Lab 07/31/24  1849 08/01/24  0419 08/02/24  0558 08/03/24  0605 08/04/24  0615 08/05/24  0444   WBC 15.48* 12.83* 9.76 5.75 5.73 5.89   HEMOGLOBIN 9.8* 9.0* 10.0* 10.3* 9.5* 9.5*   HEMATOCRIT 29.6* 28.3* 31.8* 33.2* 30.5* 30.1*   PLATELETS 201 176 219 215 201 210        AutoDiff:   Lab Results   Lab 08/03/24  0605 08/04/24 0615 08/05/24 0444   NEUTROPHIL % 57.9 60.7 64.0   LYMPHOCYTE % 20.9 20.1 19.9   MONOCYTES % 8.7 9.6 9.2   EOSINOPHIL % 11.1* 7.9* 4.8   BASOPHIL % 0.9 0.7 0.7   NEUTROS ABS 3.33 3.48 3.78   LYMPHS ABS 1.20 1.15 1.17   MONOS ABS 0.50 0.55 0.54   EOS ABS 0.64* 0.45* 0.28   BASOS ABS 0.05 0.04 0.04        Manual Diff:    Lab Results   Lab 08/03/24  0605 08/04/24  0615 08/05/24 0444   NEUTROS ABS 3.33 3.48 3.78           CMP:   Lab Results   Lab 08/03/24  0605 08/04/24  0615 08/05/24 0444 08/06/24  0508   SODIUM 143 145 144 143   POTASSIUM 3.8 3.9 3.9 4.1   CHLORIDE 110* 111* 112* 109*   CO2 19.0* 21.0* 21.0* 22.0   BUN 31* 24* 20 18   CREATININE 1.82* 1.60* 1.47* 1.47*   CALCIUM 8.3* 8.5* 8.3* 8.4*   BILIRUBIN 0.3 0.3 0.3  --    ALK PHOS 88 95 102  --    ALT (SGPT) 7 9 9  --    AST (SGOT) 12 15 16  --    GLUCOSE 125* 97 89 101*       Estimated Creatinine Clearance: 67.6 mL/min (A) (by C-G formula based on SCr of 1.47 mg/dL (H)).    Culture Results:    Microbiology Results (last 10 days)       Procedure Component Value - Date/Time    Wound Culture - Drainage, Foot, Left [[id number]]  (Abnormal) Collected: 08/02/24 8688    Lab Status: Final  result Specimen: Drainage from Foot, Left Updated: 08/04/24 0857     Wound Culture Light growth (2+) Streptococcus agalactiae (Group B)     Comment:   This organism is considered to be universally susceptible to penicillin.  No further antibiotic testing will be performed. If Clindamycin or Erythromycin is the drug of choice, notify the laboratory within 7 days to request susceptibility testing.        Gram Stain Rare (1+) WBCs seen      No organisms seen    Wound Culture - Wound, Foot, Left [[id number]]  (Abnormal) Collected: 08/02/24 1032    Lab Status: Final result Specimen: Wound from Foot, Left Updated: 08/04/24 0857     Wound Culture Light growth (2+) Streptococcus agalactiae (Group B)     Comment:   This organism is considered to be universally susceptible to penicillin.  No further antibiotic testing will be performed. If Clindamycin or Erythromycin is the drug of choice, notify the laboratory within 7 days to request susceptibility testing.        Gram Stain Few (2+) WBCs seen      Rare (1+) Gram positive cocci    Eosinophil Smear - Urine, Urine, Clean Catch [[id number]]  (Normal) Collected: 08/01/24 1547    Lab Status: Final result Specimen: Urine, Clean Catch Updated: 08/02/24 0149     Eosinophil Smear 0 % EOS/100 Cells     MRSA Screen, PCR (Inpatient) - Swab, Nares [[id number]]  (Normal) Collected: 08/01/24 0206    Lab Status: Final result Specimen: Swab from Nares Updated: 08/01/24 0346     MRSA PCR No MRSA Detected    Narrative:      The negative predictive value of this diagnostic test is high and should only be used to consider de-escalating anti-MRSA therapy. A positive result may indicate colonization with MRSA and must be correlated clinically.    Urine Culture - Urine, Straight Cath [[id number]]  (Abnormal)  (Susceptibility) Collected: 07/31/24 1851    Lab Status: Final result Specimen: Urine from Straight Cath Updated: 08/04/24 1129     Urine Culture >100,000 CFU/mL Escherichia coli ESBL     Narrative:      Colonization of the urinary tract without infection is common. Treatment is discouraged unless the patient is symptomatic, pregnant, or undergoing an invasive urologic procedure.  Recent outcomes data supports the use of pip/tazo in the treatment of susceptible ESBL infections for uncomplicated UTI. Consider use of pip/tazo as a carbapenem-sparing regimen in applicable patients.    Susceptibility        Escherichia coli ESBL      MATT      Amikacin Susceptible      Ertapenem Susceptible      Gentamicin Resistant      Levofloxacin Susceptible      Meropenem Susceptible      Nitrofurantoin Susceptible      Piperacillin + Tazobactam Susceptible      Tobramycin Resistant      Trimethoprim + Sulfamethoxazole Resistant                           Blood Culture - Blood, Hand, Left [[id number]]  (Normal) Collected: 07/31/24 1849    Lab Status: Final result Specimen: Blood from Hand, Left Updated: 08/05/24 1915     Blood Culture No growth at 5 days    Blood Culture - Blood, Arm, Left [[id number]]  (Abnormal) Collected: 07/31/24 1849    Lab Status: Final result Specimen: Blood from Arm, Left Updated: 08/02/24 0545     Blood Culture Staphylococcus, coagulase negative     Isolated from Aerobic Bottle     Gram Stain Aerobic Bottle Gram positive cocci in clusters    Narrative:      Probable contaminant requires clinical correlation, susceptibility not performed unless requested by physician.      Blood Culture ID, PCR - Blood, Arm, Left [[id number]]  (Abnormal) Collected: 07/31/24 1849    Lab Status: Final result Specimen: Blood from Arm, Left Updated: 08/01/24 1117     BCID, PCR Staph spp, not aureus or lugdunensis. Identification by BCID2 PCR.     BOTTLE TYPE Aerobic Bottle                 Radiology:   Imaging Results (Last 72 Hours)       ** No results found for the last 72 hours. **                Active Hospital Problems    Diagnosis     **DKA, type 2, not at goal     E. coli UTI, ESBL     Atrial fibrillation      Chronic heart failure with preserved ejection fraction (HFpEF)     Chronic diastolic heart failure     GERD without esophagitis     Diabetic ulcer of left foot associated with type 2 diabetes mellitus        IMPRESSION:  Diabetic foot infection-cultures on 2 separate occasions positive for group B streptococcus including debrided cultures obtained by SUSAN Hoffmann.  Urinary tract infection with ESBL E. coli-this was documented as a straight cath specimen.  Patient did complain of some frequency.  History of osteomyelitis with MRSA status posttreatment with vancomycin and transition to linezolid.    Uncontrolled diabetes mellitus with hemoglobin A1c greater than 12.    Chronic kidney disease stage IIIb with acute kidney injury.  Creatinine continues to improve.  Positive blood cultures for coagulase-negative staph-contaminant    RECOMMENDATION:   Continue ertapenem (today is day #3) to cover both ESBL in urine as well as group B streptococcus-will likely treat approximately 5 days for the urinary tract infection and assess need for ongoing therapy with p.o. for the left foot.  Dressing changes per podiatry.   Diabetes management per attending  Noted recommendations for skilled nursing facility for better wound care in this patient    Discussed with Dr. Barnett.  Agree patient not able to take care of himself at home        Julia Adrian MD  08/06/24  08:42 CDT        Electronically signed by Julia Adrian MD at 08/06/24 0392

## 2024-08-07 NOTE — THERAPY TREATMENT NOTE
Acute Care - Physical Therapy Treatment Note  [facility]     Patient Name: Erick Luong  : 1955  MRN: [id number]  Today's Date: 2024      Visit Dx:     ICD-10-CM ICD-9-CM   1. Diabetic foot infection  E11.628 250.80    L08.9 686.9   2. Osteomyelitis of foot, unspecified laterality, unspecified type  M86.9 730.27   3. Diabetic ketoacidosis without coma associated with type 2 diabetes mellitus  E11.10 250.12   4. Elevated troponin  R79.89 790.6   5. Atrial fibrillation with RVR  I48.91 427.31   6. Confusion  R41.0 298.9   7. Impaired mobility [Z74.09]  Z74.09 799.89     Patient Active Problem List   Diagnosis    Obesity, unspecified obesity severity, unspecified obesity type    Essential hypertension    Type 2 diabetes mellitus with hyperglycemia, with long-term current use of insulin    Nonsmoker    Anemia due to chronic kidney disease    Class 3 severe obesity due to excess calories with body mass index (BMI) of 40.0 to 44.9 in adult    Anasarca    Sleep apnea with use of continuous positive airway pressure (CPAP)    Medically noncompliant    Diabetic ulcer of left foot associated with type 2 diabetes mellitus    Diabetic polyneuropathy associated with type 2 diabetes mellitus    Spinal stenosis in cervical region    Degeneration of cervical intervertebral disc    Cervical radiculopathy    Degeneration of lumbar or lumbosacral intervertebral disc    Cervical myelopathy    Bilateral carpal tunnel syndrome    CAD (coronary artery disease)    GERD without esophagitis    BPH without obstruction/lower urinary tract symptoms    Stage 3b chronic kidney disease    Chronic diastolic heart failure    Type 2 myocardial infarction due to heart failure    Left carpal tunnel syndrome    Syncope and collapse, recurrent episodes    Poorly-controlled hypertension    Rhinovirus    Peripheral vascular disease    Chronic kidney disease (CKD), stage IV (severe)    Diabetic foot infection    Sepsis    Epigastric pain     Chronic heart failure with preserved ejection fraction (HFpEF)    Sepsis due to methicillin resistant Staphylococcus aureus (MRSA) with encephalopathy without septic shock    Slow transit constipation    CHF exacerbation    CHF (congestive heart failure), NYHA class I, acute on chronic, combined    Rectal bleeding    Acute on chronic heart failure with preserved ejection fraction (HFpEF)    DKA, type 2, not at goal    Atrial fibrillation    E. coli UTI, ESBL     Past Medical History:   Diagnosis Date    Arthritis     Autonomic disease     CAD (coronary artery disease) 02/06/2017    Cervical radiculopathy 09/16/2021    Chronic constipation with acute exaccerbation 05/10/2021    Coronary artery disease     Degeneration of cervical intervertebral disc 08/11/2021    Diabetes mellitus     Diabetic foot ulcer 08/31/2020    Diabetic polyneuropathy associated with type 2 diabetes mellitus 01/18/2021    Elevated cholesterol     Gastroesophageal reflux disease 05/13/2019    Headache     HTN (hypertension), benign 05/03/2017    Hyperlipidemia     Hypertension     Mixed hyperlipidemia 02/07/2017    Multiple lung nodules 01/26/2020    5mm, 9 mm RLL identified 1/2020, not present 10/2019.    Myocardial infarction     Osteomyelitis 01/22/2020    Osteomyelitis of fifth toe of right foot 10/07/2019    Pancreatitis     Persistent insomnia 01/20/2020    Renal disorder     Sleep apnea 02/06/2017    Sleep apnea with use of continuous positive airway pressure (CPAP)     NON-COMPLIANT    Slow transit constipation 01/16/2019    Spinal stenosis in cervical region 09/16/2021    Vitamin D deficiency 03/02/2021     Past Surgical History:   Procedure Laterality Date    ABDOMINAL SURGERY      AMPUTATION FOOT / TOE Left 10/2021    5th digit     ANTERIOR CERVICAL DISCECTOMY W/ FUSION N/A 08/05/2022    Procedure: CERVICAL DISCECTOMY ANTERIOR WITH FUSION C5-6 with possible plating of C5-7 with neuromonitoring and 1 c-arm;  Surgeon: Karel Soliz,  MD;  Location: [facility] OR;  Service: Neurosurgery;  Laterality: N/A;    APPENDECTOMY      BACK SURGERY      CARDIAC CATHETERIZATION Left 02/08/2021    Procedure: Left Heart Cath w poss intervention left anatomical snuff box acess;  Surgeon: Omkar Charles DO;  Location: [facility] CATH INVASIVE LOCATION;  Service: Cardiology;  Laterality: Left;    CARDIAC SURGERY      CATARACT EXTRACTION      CERVICAL SPINE SURGERY      COLONOSCOPY N/A 01/31/2017    Normal exam repeat in 5 years    COLONOSCOPY N/A 02/11/2019    Mild acute inflammation    COLONOSCOPY N/A 04/07/2024    2 areas at 10 and 20 cm with friability ulceration 2 clips placed at 20 cm and 4 clips at 10 cm poor prep normal mucosa,mild eroisions and ulcerations in visible vessels    COLONOSCOPY N/A 7/1/2024    Procedure: COLONOSCOPY WITH ANESTHESIA;  Surgeon: Arsalan Lorenzo DO;  Location: [facility] ENDOSCOPY;  Service: Gastroenterology;  Laterality: N/A;  pre op constipation/diarrhea  post poor prep  pcp Del Shetty    COLONOSCOPY N/A 7/2/2024    Procedure: COLONOSCOPY WITH ANESTHESIA;  Surgeon: Agapito Christopher MD;  Location: [facility] ENDOSCOPY;  Service: Gastroenterology;  Laterality: N/A;  pre rectal bleeding  post poor prep  pcp Del Shetty MD    COLONOSCOPY W/ POLYPECTOMY  03/04/2014    Hyperplastic polyp    CORONARY ARTERY BYPASS GRAFT  10/2015    ENDOSCOPY  04/13/2011    Gastritis with hemorrhage    ENDOSCOPY N/A 05/05/2017    Normal exam    ENDOSCOPY N/A 02/11/2019    Gastritis    ENDOSCOPY N/A 09/01/2020    Non-erosive gastritis with hemorrhage    ENDOSCOPY N/A 02/10/2021    Esophagitis    ENDOSCOPY N/A 04/11/2024    There were esophageal mucosal changes suspicious for short-segment Low's esophagus present in the distal esophagus. The maximum longitudinal extent of these mucosal changes was 2 cm in length. Mucosa was biopsied with a cold forceps for histologyDistal esophagus, biopsies: Mild chronic active esophagogastritis. No  "evidence of intestinal metaplasia, dysplasia. Antrum, bx, Mild chronic gastritis    FOOT SURGERY Left     INCISION AND DRAINAGE OF WOUND Left 09/2007    spider bite     PT Assessment (Last 12 Hours)       PT Evaluation and Treatment       Row Name 08/07/24 1445 08/07/24 1140       Physical Therapy Time and Intention    Subjective Information complains of;weakness;fatigue  -LY --    Document Type therapy note (daily note)  -LY --    Mode of Treatment physical therapy  -LY physical therapy  -LY    Session Not Performed -- patient/family declined treatment  -LY    Comment, Session Not Performed -- pt reports he is not in the mood, \"they are sending me to nursing home\"  -LY      Row Name 08/07/24 1445          General Information    Existing Precautions/Restrictions fall;other (see comments)  wound on L heel, surgical shoe  -LY       Row Name 08/07/24 1445          Pain    Pretreatment Pain Rating 0/10 - no pain  -LY     Posttreatment Pain Rating 0/10 - no pain  -LY       Row Name 08/07/24 1445          Bed Mobility    Comment, (Bed Mobility) chair  -LY       Row Name 08/07/24 1445          Sit-Stand Transfer    Sit-Stand [place] (Transfers) verbal cues;contact guard  -LY       Row Name 08/07/24 1445          Stand-Sit Transfer    Stand-Sit [place] (Transfers) verbal cues;contact guard  -LY       Row Name 08/07/24 1445          Gait/Stairs (Locomotion)    [place] Level (Gait) verbal cues;contact guard  -LY     Assistive Device (Gait) walker, front-wheeled  -LY     Distance in Feet (Gait) 50  x3  -LY     Deviations/Abnormal Patterns (Gait) gait speed decreased;stride length decreased  -LY     Bilateral Gait Deviations forward flexed posture;heel strike decreased  -LY       Row Name             Wound 08/22/23 0140 Left posterior plantar    Wound - Properties Group Placement Date: 08/22/23  -AM Placement Time: 0140  -AM Present on Original Admission: Y  -AM Side: Left  -AM Orientation: posterior  -AM " Location: plantar  -AM    Retired Wound - Properties Group Placement Date: 08/22/23  -AM Placement Time: 0140  -AM Present on Original Admission: Y  -AM Side: Left  -AM Orientation: posterior  -AM Location: plantar  -AM    Retired Wound - Properties Group Date first assessed: 08/22/23  -AM Time first assessed: 0140  -AM Present on Original Admission: Y  -AM Side: Left  -AM Location: plantar  -AM      Row Name             Wound Left lateral foot Diabetic Ulcer    Wound - Properties Group Side: Left  -MH Orientation: lateral  -MH Location: foot  -JACIEL, 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;diabetic foot ulcer/gangrene  Primary Wound Type: Diabetic ulc  -JACIEL Wound Outcome: Amputation  -JACIEL Stage, Pressure Injury : other (see comments)  -JACIEL, full thickness starting 10/20/21     Retired Wound - Properties Group Side: Left  -MH Orientation: lateral  -MH Location: foot  -JACIEL, 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;diabetic foot ulcer/gangrene  Primary Wound Type: Diabetic ulc  -JACIEL Stage, Pressure Injury : other (see comments)  -JACIEL, full thickness starting 10/20/21  Wound Outcome: Amputation  -JACIEL    Retired Wound - Properties Group Side: Left  -MH Location: foot  -JACIEL, 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;diabetic foot ulcer/gangrene  Primary Wound Type: Diabetic ulc  -JACIEL Wound Outcome: Amputation  -JACIEL      Row Name             Wound 06/15/23 0102 Left anterior plantar    Wound - Properties Group Placement Date: 06/15/23  -SM Placement Time: 0102 -SM Side: Left  -SM Orientation: anterior  -SM Location: plantar  -SM    Retired Wound - Properties Group Placement Date: 06/15/23  -SM Placement Time: 0102 -SM Side: Left  -SM Orientation: anterior  -SM Location: plantar  -SM    Retired Wound - Properties Group Date first assessed: 06/15/23  -SM Time first assessed: 0102 -SM Side: Left  -SM Location: plantar  -SM      Row Name             Wound 04/04/24 2100 Left posterior wrist Skin Tear    Wound - Properties Group Placement Date: 04/04/24  -NR Placement Time: 2100 -NR  Present on Original Admission: N  -NR Side: Left  -NR Orientation: posterior  -NR Location: wrist  -NR Primary Wound Type: Skin tear  -NR Additional Comments: likely caused by wristband. Band removed, site cleaned with alcohol swab, wraped in gauze  -NR    Retired Wound - Properties Group Placement Date: 04/04/24  -NR Placement Time: 2100  -NR Present on Original Admission: N  -NR Side: Left  -NR Orientation: posterior  -NR Location: wrist  -NR Primary Wound Type: Skin tear  -NR Additional Comments: likely caused by wristband. Band removed, site cleaned with alcohol swab, wraped in gauze  -NR    Retired Wound - Properties Group Date first assessed: 04/04/24  -NR Time first assessed: 2100 -NR Present on Original Admission: N  -NR Side: Left  -NR Location: wrist  -NR Primary Wound Type: Skin tear  -NR Additional Comments: likely caused by wristband. Band removed, site cleaned with alcohol swab, wraped in gauze  -NR      Row Name 08/07/24 1445          Plan of Care Review    Plan of Care Reviewed With patient  -LY     Progress improving  -LY       Row Name 08/07/24 1445          Positioning and Restraints    Pre-Treatment Position sitting in chair/recliner  -LY     Post Treatment Position bed  -LY     In Bed sitting EOB;call light within reach;encouraged to call for assist  -LY               User Key  (r) = Recorded By, (t) = Taken By, (c) = Cosigned By      Initials Name Provider Type    Yuliana Lisa RN Registered Nurse    MH Haase, Mallory L, RN Registered Nurse    Alesha Armenta, PTA Physical Therapist Assistant    Faviola Kunz RN Registered Nurse    Michelle Diaz RN Registered Nurse    Reuben Johnson RN Registered Nurse                    Physical Therapy Education       Title: PT OT SLP Therapies (In Progress)       Topic: Physical Therapy (Done)       Point: Mobility training (Done)       Learning Progress Summary             Patient Acceptance, E,D, DU,VU by  at 8/4/2024  2275    Comment: benefits of PT and POC, call for A OOB                         Point: Precautions (Done)       Learning Progress Summary             Patient Acceptance, E,D, DU,VU by  at 8/4/2024 7208    Comment: benefits of PT and POC, call for A OOB                                         User Key       Initials Effective Dates Name Provider Type Discipline     02/03/23 -  Daniel Garcia, PT Physical Therapist PT                  PT Recommendation and Plan     Plan of Care Reviewed With: patient  Progress: improving  Outcome Evaluation: PT tx completed. Pt in bed and agrees to therapy. S for bed mobility. Required assist for donning of L surgical shoe. No c/o pain. Pt stands with CGA. Amb 50' at a time with RWX and CGA. As pt fatigeus he requires more cues for safe gait mechanics. Noted L decreased foot clearance at times. Will cont to follow.       Time Calculation:    PT Charges       Row Name 08/07/24 1625             Time Calculation    Start Time 1445  -LY      Stop Time 1500  -LY      Time Calculation (min) 15 min  -LY      PT Received On 08/07/24  -LY         Time Calculation- PT    Total Timed Code Minutes- PT 15 minute(s)  -LY         Timed Charges    10322 - Gait Training Minutes  15  -LY         Total Minutes    Timed Charges Total Minutes 15  -LY       Total Minutes 15  -LY                User Key  (r) = Recorded By, (t) = Taken By, (c) = Cosigned By      Initials Name Provider Type    LY Alesha Dominguez PTA Physical Therapist Assistant                  Therapy Charges for Today       Code Description Service Date Service Provider Modifiers Qty    65919960482 HC GAIT TRAINING EA 15 MIN 8/6/2024 Alesha Dominguez PTA GP 1    00285946530 HC GAIT TRAINING EA 15 MIN 8/7/2024 Alesha Dominguez PTA GP 1            PT G-Codes  Outcome Measure Options: AM-PAC 6 Clicks Daily Activity (OT)  AM-PAC 6 Clicks Score (PT): 21  AM-PAC 6 Clicks Score (OT): 15    Alesha Dominguez PTA  8/7/2024

## 2024-08-07 NOTE — PLAN OF CARE
Goal Outcome Evaluation:  Plan of Care Reviewed With: patient        Progress: no change  Outcome Evaluation: VSS. Pt up with assist, safety maintained. Wound care done as ordered to L foot wounds. IVF d/c'd. BG WNL, no SSI required. Scheduled Roxicodone as ordered for chronic pain with good results.

## 2024-08-07 NOTE — CONSULTS
"Diabetes Education  Assessment/Teaching    Patient Name:  Erick Luong  YOB: 1955  MRN: [id number]  Admit Date:  7/31/2024      Assessment Date:  8/7/2024  Flowsheet Row Most Recent Value   General Information     Height 182.9 cm (72\")   Height Method Estimated   Weight 133 kg (293 lb 3.2 oz)   Weight Method Standing scale   Pregnancy Assessment    Diabetes History    Education Preferences    Nutrition Information    Assessment Topics    DM Goals                   Other Comments:  Pt admitted with A1C of 12.6%.  Pt has had multiple admissions and A1C has always been above 9.% since 2019.  Pt has been receiving Glargine 10 units here and highest blood sugar 133. Pt has nonhealing wound on foot and has had for extended period of time. Being treated by Dr. Gomes in [place].  Pt can not answer questions about his care-ex. Meds and treatments and so on.  Pt can not care for himself.  Wife is admitted and not been there for help but again his A1C has been elevated for years.        Electronically signed by:  Roseline Chance RN  08/07/24 09:29 CDT  "

## 2024-08-07 NOTE — PLAN OF CARE
Goal Outcome Evaluation:  Plan of Care Reviewed With: patient           Outcome Evaluation: Patient a&o, new IV per vats, Up to chair most of shift. Encourage oral intake - poor appetite. No c/o pain. c/o nausea x1 - no emesis noted. Drsg change orders. SS following for discharge placement. Safety maintained

## 2024-08-08 LAB
ANION GAP SERPL CALCULATED.3IONS-SCNC: 11 MMOL/L (ref 5–15)
BUN SERPL-MCNC: 15 MG/DL (ref 8–23)
BUN/CREAT SERPL: 10.6 (ref 7–25)
CALCIUM SPEC-SCNC: 8.5 MG/DL (ref 8.6–10.5)
CHLORIDE SERPL-SCNC: 109 MMOL/L (ref 98–107)
CO2 SERPL-SCNC: 21 MMOL/L (ref 22–29)
CREAT SERPL-MCNC: 1.42 MG/DL (ref 0.76–1.27)
DEPRECATED RDW RBC AUTO: 47.3 FL (ref 37–54)
EGFRCR SERPLBLD CKD-EPI 2021: 53.8 ML/MIN/1.73
ERYTHROCYTE [DISTWIDTH] IN BLOOD BY AUTOMATED COUNT: 15.1 % (ref 12.3–15.4)
GLUCOSE BLDC GLUCOMTR-MCNC: 108 MG/DL (ref 70–130)
GLUCOSE BLDC GLUCOMTR-MCNC: 122 MG/DL (ref 70–130)
GLUCOSE BLDC GLUCOMTR-MCNC: 158 MG/DL (ref 70–130)
GLUCOSE BLDC GLUCOMTR-MCNC: 167 MG/DL (ref 70–130)
GLUCOSE SERPL-MCNC: 110 MG/DL (ref 65–99)
HCT VFR BLD AUTO: 30.5 % (ref 37.5–51)
HGB BLD-MCNC: 9.6 G/DL (ref 13–17.7)
MCH RBC QN AUTO: 27.5 PG (ref 26.6–33)
MCHC RBC AUTO-ENTMCNC: 31.5 G/DL (ref 31.5–35.7)
MCV RBC AUTO: 87.4 FL (ref 79–97)
PLATELET # BLD AUTO: 242 10*3/MM3 (ref 140–450)
PMV BLD AUTO: 11.6 FL (ref 6–12)
POTASSIUM SERPL-SCNC: 3.8 MMOL/L (ref 3.5–5.2)
RBC # BLD AUTO: 3.49 10*6/MM3 (ref 4.14–5.8)
SODIUM SERPL-SCNC: 141 MMOL/L (ref 136–145)
WBC NRBC COR # BLD AUTO: 8.08 10*3/MM3 (ref 3.4–10.8)

## 2024-08-08 PROCEDURE — 63710000001 INSULIN GLARGINE PER 5 UNITS: Performed by: INTERNAL MEDICINE

## 2024-08-08 PROCEDURE — 97116 GAIT TRAINING THERAPY: CPT

## 2024-08-08 PROCEDURE — 97110 THERAPEUTIC EXERCISES: CPT | Performed by: OCCUPATIONAL THERAPIST

## 2024-08-08 PROCEDURE — 63710000001 INSULIN REGULAR HUMAN PER 5 UNITS: Performed by: INTERNAL MEDICINE

## 2024-08-08 PROCEDURE — 99231 SBSQ HOSP IP/OBS SF/LOW 25: CPT

## 2024-08-08 PROCEDURE — 25010000002 ONDANSETRON PER 1 MG: Performed by: FAMILY MEDICINE

## 2024-08-08 PROCEDURE — 25010000002 ENOXAPARIN PER 10 MG: Performed by: INTERNAL MEDICINE

## 2024-08-08 PROCEDURE — 85027 COMPLETE CBC AUTOMATED: CPT | Performed by: FAMILY MEDICINE

## 2024-08-08 PROCEDURE — 99232 SBSQ HOSP IP/OBS MODERATE 35: CPT | Performed by: INTERNAL MEDICINE

## 2024-08-08 PROCEDURE — 80048 BASIC METABOLIC PNL TOTAL CA: CPT | Performed by: INTERNAL MEDICINE

## 2024-08-08 PROCEDURE — 82948 REAGENT STRIP/BLOOD GLUCOSE: CPT

## 2024-08-08 RX ADMIN — PREGABALIN 100 MG: 100 CAPSULE ORAL at 20:22

## 2024-08-08 RX ADMIN — SODIUM BICARBONATE 650 MG: 650 TABLET ORAL at 08:24

## 2024-08-08 RX ADMIN — DULOXETINE HYDROCHLORIDE 60 MG: 30 CAPSULE, DELAYED RELEASE ORAL at 08:24

## 2024-08-08 RX ADMIN — DONEPEZIL HYDROCHLORIDE 10 MG: 10 TABLET, FILM COATED ORAL at 08:24

## 2024-08-08 RX ADMIN — POLYETHYLENE GLYCOL 3350 17 G: 17 POWDER, FOR SOLUTION ORAL at 08:23

## 2024-08-08 RX ADMIN — PREGABALIN 50 MG: 25 CAPSULE ORAL at 08:23

## 2024-08-08 RX ADMIN — FAMOTIDINE 20 MG: 20 TABLET ORAL at 20:22

## 2024-08-08 RX ADMIN — PANTOPRAZOLE SODIUM 40 MG: 40 TABLET, DELAYED RELEASE ORAL at 08:24

## 2024-08-08 RX ADMIN — SODIUM BICARBONATE 650 MG: 650 TABLET ORAL at 20:22

## 2024-08-08 RX ADMIN — FAMOTIDINE 20 MG: 20 TABLET ORAL at 08:24

## 2024-08-08 RX ADMIN — Medication 10 ML: at 20:23

## 2024-08-08 RX ADMIN — Medication 10 ML: at 08:25

## 2024-08-08 RX ADMIN — INSULIN GLARGINE 10 UNITS: 100 INJECTION, SOLUTION SUBCUTANEOUS at 08:23

## 2024-08-08 RX ADMIN — SODIUM BICARBONATE 650 MG: 650 TABLET ORAL at 18:04

## 2024-08-08 RX ADMIN — PANTOPRAZOLE SODIUM 40 MG: 40 TABLET, DELAYED RELEASE ORAL at 20:22

## 2024-08-08 RX ADMIN — ENOXAPARIN SODIUM 135 MG: 150 INJECTION SUBCUTANEOUS at 08:31

## 2024-08-08 RX ADMIN — OXYCODONE HYDROCHLORIDE AND ACETAMINOPHEN 1000 MG: 500 TABLET ORAL at 08:23

## 2024-08-08 RX ADMIN — OXYCODONE HYDROCHLORIDE 10 MG: 5 TABLET ORAL at 08:24

## 2024-08-08 RX ADMIN — Medication 2.5 MG: at 20:22

## 2024-08-08 RX ADMIN — OXYCODONE HYDROCHLORIDE 10 MG: 5 TABLET ORAL at 20:22

## 2024-08-08 RX ADMIN — Medication 10 ML: at 08:23

## 2024-08-08 RX ADMIN — ENOXAPARIN SODIUM 135 MG: 150 INJECTION SUBCUTANEOUS at 20:25

## 2024-08-08 RX ADMIN — ROSUVASTATIN CALCIUM 10 MG: 10 TABLET, FILM COATED ORAL at 20:22

## 2024-08-08 RX ADMIN — ONDANSETRON 4 MG: 2 INJECTION INTRAMUSCULAR; INTRAVENOUS at 09:00

## 2024-08-08 RX ADMIN — INSULIN HUMAN 3 UNITS: 100 INJECTION, SOLUTION PARENTERAL at 18:04

## 2024-08-08 NOTE — PLAN OF CARE
Goal Outcome Evaluation:           Progress: improving     VSS.  RA.  Wound care to LLE as ordered.  Scheduled pain medication as ordered.  Accuchecks and insulin as ordered.  Lovenox.  Safety maintained.  Cooperative with care.  A/O.

## 2024-08-08 NOTE — PROGRESS NOTES
Nephrology ([facility]) Progress Note      Patient:  Erick Luong  YOB: 1955  Date of Service: 8/8/2024  MRN: [id number]   Acct: [id number]   Primary Care Physician: Del Shetty MD  Advance Directive:   Code Status and Medical Interventions: CPR (Attempt to Resuscitate); Full Support   Ordered at: 08/01/24 0053     Level Of Support Discussed With:    Patient     Code Status (Patient has no pulse and is not breathing):    CPR (Attempt to Resuscitate)     Medical Interventions (Patient has pulse or is breathing):    Full Support     Admit Date: 7/31/2024       Hospital Day: 8  Referring Provider: Abebe Garzon DO      Patient personally seen and examined.  Complete chart including Consults, Notes, Operative Reports, Labs, Cardiology, and Radiology studies reviewed as able.        Subjective:  Erick Luong is a 69 y.o. male for whom we were consulted for evaluation and treatment of acute kidney injury. Baseline chronic kidney disease stage 3b. Baseline creatinine approximately 1.5-1.8. Follows in our office.  History of poorly controlled type 2 diabetes, hypertension, coronary artery disease, diabetic foot ulcer, obesity.  On 7/31 patient was found wandering at home, confused. Brought to ER for evaluation. Has a nonhealing left foot diabetic ulcer with serosanguineous drainage. Blood glucose level was >700 with A1c >12%. Initial creatinine was 2.67 and has steadily improved since he was admitted. Nephrology consulted on 8/01. Hospital course remarkable for improving renal function and improved blood glucose levels. Moved to medical floor    Today is awake and alert. No new complaints or overnight issues. Urine output nonoliguric    Allergies:  Cefepime, Bactrim [sulfamethoxazole-trimethoprim], Vancomycin, Zolpidem, and Metronidazole    Home Meds:  Medications Prior to Admission   Medication Sig Dispense Refill Last Dose    ascorbic acid (VITAMIN C) 1000 MG tablet  Take 1 tablet by mouth Daily. 30 tablet 3     bumetanide (BUMEX) 1 MG tablet Take 1 tablet by mouth 2 (Two) Times a Day for 30 days. 60 tablet 0     busPIRone (BUSPAR) 10 MG tablet Take 1 tablet by mouth 2 (Two) Times a Day.       calcitriol (ROCALTROL) 0.5 MCG capsule Take 1 capsule by mouth Daily. 90 capsule 4     carvedilol (COREG) 3.125 MG tablet Take 1 tablet twice a day by oral route. 60 tablet 4     donepezil (ARICEPT) 10 MG tablet Take 1 tablet by mouth Daily. 90 tablet 1     DULoxetine (CYMBALTA) 60 MG capsule Take 1 capsule by mouth Daily. 90 capsule 4     famotidine (PEPCID) 20 MG tablet Take 1 tablet twice a day by oral route. 180 tablet 4     Insulin Glargine (Lantus SoloStar) 100 UNIT/ML injection pen Inject 20 Units under the skin into the appropriate area as directed every night at bedtime. 15 mL 3     Insulin Regular Human, Conc, (HumuLIN R) 500 UNIT/ML solution pen-injector CONCENTRATED injection Inject 40 Units under the skin into the appropriate area as directed 2 (Two) Times a Day Before Meals. Inject 40 units under the skin in the the appropriate area with regular meals AND 40 units with large meals.       Iron-Vitamin C (Vitron-C)  MG tablet Take 1 tablet twice a day by oral route. 60 tablet 2     levocetirizine (XYZAL) 5 MG tablet Take 1 tablet by mouth every day at bedtime. 30 tablet 2     melatonin 3 MG tablet Take 2 tablets by mouth At Night As Needed for Sleep. 30 tablet 0     oxyCODONE (ROXICODONE) 10 MG tablet Take 1 tablet by mouth 2 (Two) Times a Day As Needed. Must last 30 days per md. 55 tablet 0     pantoprazole (PROTONIX) 40 MG EC tablet Take 1 tablet by mouth Every 12 (Twelve) Hours. 180 tablet 4     pregabalin (LYRICA) 100 MG capsule Take 1 capsule by mouth Daily.       pregabalin (LYRICA) 100 MG capsule Take 2 capsules by mouth every night at bedtime.       rosuvastatin (CRESTOR) 10 MG tablet Take 1 tablet by mouth Every Night. 30 tablet 2     sucralfate (CARAFATE) 1 g  tablet Take 1 tablet by mouth 4 (Four) Times a Day before meals. 360 tablet 1     Diclofenac Sodium (VOLTAREN) 1 % gel gel Apply 2 g topically to the appropriate area as directed 4 (Four) Times a Day As Needed. 300 g 11     nitroglycerin (NITROSTAT) 0.4 MG SL tablet Place 1 tablet under the tongue Every 5 (Five) Minutes As Needed for Chest Pain. Take no more than 3 doses in 15 minutes.       polyethylene glycol (MIRALAX) 17 GM/SCOOP powder Take 17 g by mouth Daily As Needed (constipation).       sennosides-docusate (PERICOLACE) 8.6-50 MG per tablet Take 1 tablet by mouth Every Night. Obtain OTC          Medicines:  Current Facility-Administered Medications   Medication Dose Route Frequency Provider Last Rate Last Admin    ascorbic acid (VITAMIN C) tablet 1,000 mg  1,000 mg Oral Daily eRuben Garza APRN   1,000 mg at 08/08/24 0823    sennosides-docusate (PERICOLACE) 8.6-50 MG per tablet 2 tablet  2 tablet Oral BID Lamine Figueroa APRN   2 tablet at 08/07/24 2229    And    polyethylene glycol (MIRALAX) packet 17 g  17 g Oral Daily PRN Lamine Figueroa APRN        And    bisacodyl (DULCOLAX) EC tablet 5 mg  5 mg Oral Daily PRN Lamine Figueroa APRN        And    bisacodyl (DULCOLAX) suppository 10 mg  10 mg Rectal Daily PRN Lamine Figueroa APRN   10 mg at 08/02/24 0941    Calcium Replacement - Follow Nurse / BPA Driven Protocol   Does not apply PRN Abebe Garzon DO        dextrose (D50W) (25 g/50 mL) IV injection 10-50 mL  10-50 mL Intravenous Q15 Min PRN Abebe Garzon DO        dextrose (D50W) (25 g/50 mL) IV injection 25 g  25 g Intravenous Q15 Min PRN Lamine Figueroa APRN        dextrose (GLUTOSE) oral gel 15 g  15 g Oral Q15 Min PRN Lamine Figueroa APRN        donepezil (ARICEPT) tablet 10 mg  10 mg Oral Daily Reuben Garza APRN   10 mg at 08/08/24 0824    DULoxetine (CYMBALTA) DR capsule 60 mg  60 mg Oral Daily Reuben Garza APRN   60 mg at 08/08/24 0824    Enoxaparin Sodium (LOVENOX)  syringe 135 mg  1 mg/kg Subcutaneous Q12H Abebe Garzon DO   135 mg at 08/08/24 0831    famotidine (PEPCID) tablet 20 mg  20 mg Oral Q12H Reuben Garza APRN   20 mg at 08/08/24 0824    glucagon (GLUCAGEN) injection 1 mg  1 mg Intramuscular Q15 Min PRN Lamine Figueroa APRN        insulin glargine (LANTUS, SEMGLEE) injection 10 Units  10 Units Subcutaneous Daily Keren Jamison MD   10 Units at 08/08/24 0823    insulin regular (humuLIN R,novoLIN R) injection 3-14 Units  3-14 Units Subcutaneous 4x Daily AC & at Bedtime Keren Jamison MD   3 Units at 08/03/24 2033    Magnesium Standard Dose Replacement - Follow Nurse / BPA Driven Protocol   Does not apply PRN Abebe Garzon DO        melatonin tablet 2.5 mg  2.5 mg Oral Nightly Reuben Garza APRN   2.5 mg at 08/07/24 2230    nitroglycerin (NITROSTAT) SL tablet 0.4 mg  0.4 mg Sublingual Q5 Min PRN Lamine Figueroa APRN        ondansetron (ZOFRAN) injection 4 mg  4 mg Intravenous Q6H PRN Payam Keyes DO   4 mg at 08/08/24 0900    oxyCODONE (ROXICODONE) immediate release tablet 10 mg  10 mg Oral BID Lamine Figueroa APRN   10 mg at 08/08/24 0824    pantoprazole (PROTONIX) EC tablet 40 mg  40 mg Oral Q12H Reuben Garza APRN   40 mg at 08/08/24 0824    Pharmacy to Dose enoxaparin (LOVENOX)   Does not apply Continuous PRN Abebe Garzon DO        Phosphorus Replacement - Follow Nurse / BPA Driven Protocol   Does not apply PRN Abebe Garzon DO        polyethylene glycol (MIRALAX) packet 17 g  17 g Oral Daily Reuben Garza APRN   17 g at 08/08/24 0823    Potassium Replacement - Follow Nurse / BPA Driven Protocol   Does not apply PRN Abebe Garzon DO        pregabalin (LYRICA) capsule 100 mg  100 mg Oral Nightly Reuben Garza APRN   100 mg at 08/07/24 2229    pregabalin (LYRICA) capsule 50 mg  50 mg Oral Daily Reuben Garza APRN   50 mg at 08/08/24 0823    rosuvastatin (CRESTOR) tablet 10 mg  10 mg Oral Nightly  Reuben Garza APRN   10 mg at 08/07/24 2231    sodium bicarbonate tablet 650 mg  650 mg Oral TID Brian Lomas MD   650 mg at 08/08/24 0824    sodium chloride 0.9 % flush 10 mL  10 mL Intravenous Q12H Abebe Garzon, DO   10 mL at 08/08/24 0825    sodium chloride 0.9 % flush 10 mL  10 mL Intravenous PRN Abebe Garzon,         sodium chloride 0.9 % flush 10 mL  10 mL Intravenous Q12H Lamine Figueroa APRN   10 mL at 08/08/24 0823    sodium chloride 0.9 % flush 10 mL  10 mL Intravenous PRN Lamine Figueroa APRN        sodium chloride 0.9 % infusion 40 mL  40 mL Intravenous PRN Abebe Garzon,         sodium chloride 0.9 % infusion 40 mL  40 mL Intravenous PRN Lamine Figueroa APRN        sodium hypochlorite (DAKIN'S 1/4 STRENGTH) 0.125 % topical solution 0.125% solution   Topical Q12H Aby High APRN   Given at 08/06/24 1000       Past Medical History:  Past Medical History:   Diagnosis Date    Arthritis     Autonomic disease     CAD (coronary artery disease) 02/06/2017    Cervical radiculopathy 09/16/2021    Chronic constipation with acute exaccerbation 05/10/2021    Coronary artery disease     Degeneration of cervical intervertebral disc 08/11/2021    Diabetes mellitus     Diabetic foot ulcer 08/31/2020    Diabetic polyneuropathy associated with type 2 diabetes mellitus 01/18/2021    Elevated cholesterol     Gastroesophageal reflux disease 05/13/2019    Headache     HTN (hypertension), benign 05/03/2017    Hyperlipidemia     Hypertension     Mixed hyperlipidemia 02/07/2017    Multiple lung nodules 01/26/2020    5mm, 9 mm RLL identified 1/2020, not present 10/2019.    Myocardial infarction     Osteomyelitis 01/22/2020    Osteomyelitis of fifth toe of right foot 10/07/2019    Pancreatitis     Persistent insomnia 01/20/2020    Renal disorder     Sleep apnea 02/06/2017    Sleep apnea with use of continuous positive airway pressure (CPAP)     NON-COMPLIANT    Slow transit constipation  01/16/2019    Spinal stenosis in cervical region 09/16/2021    Vitamin D deficiency 03/02/2021       Past Surgical History:  Past Surgical History:   Procedure Laterality Date    ABDOMINAL SURGERY      AMPUTATION FOOT / TOE Left 10/2021    5th digit     ANTERIOR CERVICAL DISCECTOMY W/ FUSION N/A 08/05/2022    Procedure: CERVICAL DISCECTOMY ANTERIOR WITH FUSION C5-6 with possible plating of C5-7 with neuromonitoring and 1 c-arm;  Surgeon: Karel Soliz MD;  Location: [facility] OR;  Service: Neurosurgery;  Laterality: N/A;    APPENDECTOMY      BACK SURGERY      CARDIAC CATHETERIZATION Left 02/08/2021    Procedure: Left Heart Cath w poss intervention left anatomical snuff box acess;  Surgeon: Omkar Charles DO;  Location: [facility] CATH INVASIVE LOCATION;  Service: Cardiology;  Laterality: Left;    CARDIAC SURGERY      CATARACT EXTRACTION      CERVICAL SPINE SURGERY      COLONOSCOPY N/A 01/31/2017    Normal exam repeat in 5 years    COLONOSCOPY N/A 02/11/2019    Mild acute inflammation    COLONOSCOPY N/A 04/07/2024    2 areas at 10 and 20 cm with friability ulceration 2 clips placed at 20 cm and 4 clips at 10 cm poor prep normal mucosa,mild eroisions and ulcerations in visible vessels    COLONOSCOPY N/A 7/1/2024    Procedure: COLONOSCOPY WITH ANESTHESIA;  Surgeon: Arsalan Lorenzo DO;  Location: [facility] ENDOSCOPY;  Service: Gastroenterology;  Laterality: N/A;  pre op constipation/diarrhea  post poor prep  pcp Del Shetty    COLONOSCOPY N/A 7/2/2024    Procedure: COLONOSCOPY WITH ANESTHESIA;  Surgeon: Agapito Christopher MD;  Location: [facility] ENDOSCOPY;  Service: Gastroenterology;  Laterality: N/A;  pre rectal bleeding  post poor prep  pcp Del Shetty MD    COLONOSCOPY W/ POLYPECTOMY  03/04/2014    Hyperplastic polyp    CORONARY ARTERY BYPASS GRAFT  10/2015    ENDOSCOPY  04/13/2011    Gastritis with hemorrhage    ENDOSCOPY N/A 05/05/2017    Normal exam    ENDOSCOPY N/A 02/11/2019    Gastritis     ENDOSCOPY N/A 2020    Non-erosive gastritis with hemorrhage    ENDOSCOPY N/A 02/10/2021    Esophagitis    ENDOSCOPY N/A 2024    There were esophageal mucosal changes suspicious for short-segment Low's esophagus present in the distal esophagus. The maximum longitudinal extent of these mucosal changes was 2 cm in length. Mucosa was biopsied with a cold forceps for histologyDistal esophagus, biopsies: Mild chronic active esophagogastritis. No evidence of intestinal metaplasia, dysplasia. Antrum, bx, Mild chronic gastritis    FOOT SURGERY Left     INCISION AND DRAINAGE OF WOUND Left 2007    spider bite       Family History  Family History   Problem Relation Age of Onset    Colon cancer Father     Heart disease Father     Colon cancer Sister     Colon polyps Sister     Alzheimer's disease Mother     Coronary artery disease Sister     Coronary artery disease Sister        Social History  Social History     Socioeconomic History    Marital status:    Tobacco Use    Smoking status: Former     Current packs/day: 0.00     Types: Cigarettes     Quit date:      Years since quittin.6    Smokeless tobacco: Never    Tobacco comments:     smoked in highschool   Vaping Use    Vaping status: Never Used   Substance and Sexual Activity    Alcohol use: No    Drug use: No    Sexual activity: Defer       Review of Systems:  History obtained from chart review and the patient  General ROS: No fever or chills  Respiratory ROS: No cough, shortness of breath, wheezing  Cardiovascular ROS: No chest pain or palpitations  Gastrointestinal ROS: No abdominal pain or melena  Genito-Urinary ROS: No dysuria or hematuria  Psych ROS: No anxiety and depression  14 point ROS reviewed with the patient and negative except as noted above and in the HPI unless unable to obtain.    Objective:  Patient Vitals for the past 24 hrs:   BP Temp Temp src Pulse Resp SpO2 Weight   24 0803 131/71 96.8 °F (36 °C) Axillary 69 16 95  % --   08/08/24 0600 -- -- -- -- -- -- 133 kg (293 lb)   08/08/24 0454 146/71 98.2 °F (36.8 °C) Oral 72 16 96 % --   08/07/24 2001 146/73 98.4 °F (36.9 °C) Oral 76 16 94 % --   08/07/24 1542 147/65 98.2 °F (36.8 °C) Oral 77 16 96 % --   08/07/24 1134 136/58 98.1 °F (36.7 °C) Oral 76 16 100 % --       Intake/Output Summary (Last 24 hours) at 8/8/2024 1022  Last data filed at 8/8/2024 0600  Gross per 24 hour   Intake --   Output 250 ml   Net -250 ml       General: awake/alert   Chest:  clear to auscultation bilaterally without respiratory distress  CVS: regular rate and rhythm  Abdominal: soft, nontender, positive bowel sounds  Extremities: no cyanosis or edema  Skin:  left foot ulcer and warm and dry without rash      Labs:  Results from last 7 days   Lab Units 08/08/24  0449 08/05/24  0444 08/04/24  0615   WBC 10*3/mm3 8.08 5.89 5.73   HEMOGLOBIN g/dL 9.6* 9.5* 9.5*   HEMATOCRIT % 30.5* 30.1* 30.5*   PLATELETS 10*3/mm3 242 210 201         Results from last 7 days   Lab Units 08/08/24  0449 08/07/24  0758 08/06/24  0508 08/05/24  0444 08/04/24  0615 08/03/24  0605   SODIUM mmol/L 141 144 143 144 145 143   POTASSIUM mmol/L 3.8 3.9 4.1 3.9 3.9 3.8   CHLORIDE mmol/L 109* 110* 109* 112* 111* 110*   CO2 mmol/L 21.0* 20.0* 22.0 21.0* 21.0* 19.0*   BUN mg/dL 15 15 18 20 24* 31*   CREATININE mg/dL 1.42* 1.50* 1.47* 1.47* 1.60* 1.82*   CALCIUM mg/dL 8.5* 8.6 8.4* 8.3* 8.5* 8.3*   EGFR mL/min/1.73 53.8* 50.4* 51.6* 51.6* 46.6* 40.0*   BILIRUBIN mg/dL  --   --   --  0.3 0.3 0.3   ALK PHOS U/L  --   --   --  102 95 88   ALT (SGPT) U/L  --   --   --  9 9 7   AST (SGOT) U/L  --   --   --  16 15 12   GLUCOSE mg/dL 110* 97 101* 89 97 125*       Radiology:   Imaging Results (Last 72 Hours)       ** No results found for the last 72 hours. **            Culture:  Blood Culture   Date Value Ref Range Status   07/31/2024 No growth at 24 hours  Preliminary   07/31/2024 Staphylococcus, coagulase negative (C)  Final         Assessment     Acute kidney injury, ATN--resolving  Baseline chronic kidney disease stage 3b  Type 2 diabetes  Hypertension  Sepsis related to diabetic foot ulcer  Anemia of CKD  Obesity     Plan:   Encourage compliance with medications and diabetic diet  Renal function remains stable   Monitor labs      Stewart Alvarez, APRN  8/8/2024  10:22 CDT

## 2024-08-08 NOTE — DISCHARGE PLACEMENT REQUEST
"Call back: Naomie Giron, [phone]    Erick Luong (69 y.o. Male)       Date of Birth   1955    Social Security Number       [place]   [address] [facility] [address]    Home Phone       MRN   [id number]       [facility]    Marital Status                               Admission Date   7/31/24    Admission Type   Emergency    Admitting Provider   Alejandrina Barnett DO    Attending Provider   Alejandrina Barnett DO    Department, Room/Bed   [facility] 3C, 365/1       Discharge Date       Discharge Disposition       Discharge Destination                                 Attending Provider: Alejandrina Barnett DO    Allergies: Cefepime, Bactrim [Sulfamethoxazole-trimethoprim], Vancomycin, Zolpidem, Metronidazole    Isolation: Contact   Infection: MRSA (05/19/19), COVID (History) (08/08/22), ESBL E coli (06/30/24)   Code Status: CPR    Ht: 182.9 cm (72\")   Wt: 133 kg (293 lb)    Admission Cmt: None   Principal Problem: DKA, type 2, not at goal [E11.10]                   Active Insurance as of 7/31/2024       Primary Coverage       Payor Plan Insurance Group Employer/Plan Group    [employer] [facility] [employer] EMPLOYEE [id number]       Payor Plan Address Payor Plan Phone Number Payor Plan Fax Number Effective Dates    [address] [phone]  1/1/2022 - None Entered    [facility] [id number]         Subscriber Name Subscriber Birth Date Member ID       ZAINAB LUONG 11/27/1969 [id number]               Secondary Coverage       Payor Plan Insurance Group Employer/Plan Group    MEDICARE MEDICARE A & B        Payor Plan Address Payor Plan Phone Number Payor Plan Fax Number Effective Dates    [address] [phone]  7/1/2013 - None Entered    [facility] [id number]         Subscriber Name Subscriber Birth Date Member ID       ERICK LUONG 1955 [id number]                     Emergency Contacts        (Rel.) Home Phone Work Phone Mobile Phone    " Joan Luong (Spouse) [phone]              Insurance Information                  [employer] [facility] EMPLOYEE Phone: [phone]    Subscriber: Joan Luong Subscriber#: [id number]    Group#: [id number] Precert#: --        MEDICARE/MEDICARE A & B Phone: [phone]    Subscriber: Erick Luong Subscriber#: [id number]    Group#: -- Precert#: --             History & Physical        Lamine Figueroa APRN at 07/31/24 2230       Attestation signed by Keren Jamison MD at 08/02/24 9351    I have reviewed this documentation and agree.    I have seen and examined the patient and reviewed the chart and the blood work and radiology and discussed the patient with the midlevel and the team and put the plan of the management and I agree with the above    I provided 75 minutes of total critical care time. Due to the high probability of clinically significant, life-threatening deterioration, the patient required my direct and personal management. The critical care time does not include time spent on separately billable procedures.                       [facility] Intensivist Services    Date of Admission: 7/31/2024  Date of Note: 07/31/24  Primary Care Physician: Del Shetty MD    History   Mr. Luong is a 69-year-old male who presented to [facility] ER via EMS for altered mental status.  It was reported to ER physician by EMS that the patient was was found by his son confused and wandering around in the garage.  Unfortunately family was not available for additional information.   HPI obtained from ER physician, Dr. Dee, who advises that patient presented soiled, confused and unable to provided events leading to his reason for EMS transport.     ER course workup significant for: CBC with elevated WBC of 15.4, low hemoglobin 9.8 and hematocrit 29.6, elevated ESR 57, elevated CRP of 17.  CMP with low sodium 133, elevated BUN of 54 and  elevated creatinine 2.64, elevated glucose 704, urine ketones and small amount of acetone. Elevated lactate 2.4 with delta 2.2.  Elevated HS troponin T 342 delta 353.   Urinalysis: Trace blood, positive nitrite, negative leukocytes, +2 bacteria.   CXR: Low lung volumes and vascular crowding.  X-ray of left foot: Ulcer on the sole of the foot posteriorly near the calcaneus.  There is questionable small area of bony erosion along the plantar surface of the calcaneus just proximal to the plantar calcaneal spur.  Osteomyelitis cannot be ruled out.   EKG: A-fib with rapid ventricular response with rate of 128 bpm.     The ICU team was asked to evaluate the patient for AMS, AFIB-RVR, DKA, and open wound to left foot concerning for possible osteomyelitis.     I have seen and evaluated patient upon his arrival to CCU 4 where he appears in no acute distress, breathing is equal and non-labored.  He is slow to provided answers and is intermittently confused. He is able to tell me his name, , and the year after many tries, he is unaware to reason for hospitalization or how he got here.   He currently has insulin, and Cardizem drip infusing. Reports generalized discomfort, worse throughout the abdomen with associated nausea and reports vomiting early today and being sick the last couple days, does not give further details. Abdomen is distended and soft, grossly tender. Denies diarrhea or bloody/dark stool. Reports chest discomfort and shortness of breath. Open stage 2-3 decubitus ulcer to the left heel with associated soft tissue swelling, redness and weeping.     Past Medical History     Active and Resolved Problems  Active Hospital Problems    Diagnosis  POA    **DKA, type 2, not at goal [E11.10]  Yes      Resolved Hospital Problems   No resolved problems to display.       Past Medical History:   Past Medical History:   Diagnosis Date    Arthritis     Autonomic disease     CAD (coronary artery disease) 2017     Cervical radiculopathy 09/16/2021    Chronic constipation with acute exaccerbation 05/10/2021    Coronary artery disease     Degeneration of cervical intervertebral disc 08/11/2021    Diabetes mellitus     Diabetic foot ulcer 08/31/2020    Diabetic polyneuropathy associated with type 2 diabetes mellitus 01/18/2021    Elevated cholesterol     Gastroesophageal reflux disease 05/13/2019    Headache     HTN (hypertension), benign 05/03/2017    Hyperlipidemia     Hypertension     Mixed hyperlipidemia 02/07/2017    Multiple lung nodules 01/26/2020    5mm, 9 mm RLL identified 1/2020, not present 10/2019.    Myocardial infarction     Osteomyelitis 01/22/2020    Osteomyelitis of fifth toe of right foot 10/07/2019    Pancreatitis     Persistent insomnia 01/20/2020    Renal disorder     Sleep apnea 02/06/2017    Sleep apnea with use of continuous positive airway pressure (CPAP)     NON-COMPLIANT    Slow transit constipation 01/16/2019    Spinal stenosis in cervical region 09/16/2021    Vitamin D deficiency 03/02/2021       Prior Surgeries: He  has a past surgical history that includes Appendectomy; Abdominal surgery; Cataract extraction; Cardiac surgery; Incision and drainage of wound (Left, 09/2007); Colonoscopy w/ polypectomy (03/04/2014); Esophagogastroduodenoscopy (04/13/2011); Cervical spine surgery; Colonoscopy (N/A, 01/31/2017); Esophagogastroduodenoscopy (N/A, 05/05/2017); Coronary artery bypass graft (10/2015); Back surgery; Esophagogastroduodenoscopy (N/A, 02/11/2019); Colonoscopy (N/A, 02/11/2019); Esophagogastroduodenoscopy (N/A, 09/01/2020); Esophagogastroduodenoscopy (N/A, 02/10/2021); Cardiac catheterization (Left, 02/08/2021); Amputation foot / toe (Left, 10/2021); Foot surgery (Left); Anterior cervical discectomy w/ fusion (N/A, 08/05/2022); Colonoscopy (N/A, 04/07/2024); Esophagogastroduodenoscopy (N/A, 04/11/2024); Colonoscopy (N/A, 7/1/2024); and Colonoscopy (N/A, 7/2/2024).    Past Surgical History:   Past  Surgical History:   Procedure Laterality Date    ABDOMINAL SURGERY      AMPUTATION FOOT / TOE Left 10/2021    5th digit     ANTERIOR CERVICAL DISCECTOMY W/ FUSION N/A 08/05/2022    Procedure: CERVICAL DISCECTOMY ANTERIOR WITH FUSION C5-6 with possible plating of C5-7 with neuromonitoring and 1 c-arm;  Surgeon: Karel Soliz MD;  Location: [facility] OR;  Service: Neurosurgery;  Laterality: N/A;    APPENDECTOMY      BACK SURGERY      CARDIAC CATHETERIZATION Left 02/08/2021    Procedure: Left Heart Cath w poss intervention left anatomical snuff box acess;  Surgeon: Omkar Charles DO;  Location: [facility] CATH INVASIVE LOCATION;  Service: Cardiology;  Laterality: Left;    CARDIAC SURGERY      CATARACT EXTRACTION      CERVICAL SPINE SURGERY      COLONOSCOPY N/A 01/31/2017    Normal exam repeat in 5 years    COLONOSCOPY N/A 02/11/2019    Mild acute inflammation    COLONOSCOPY N/A 04/07/2024    2 areas at 10 and 20 cm with friability ulceration 2 clips placed at 20 cm and 4 clips at 10 cm poor prep normal mucosa,mild eroisions and ulcerations in visible vessels    COLONOSCOPY N/A 7/1/2024    Procedure: COLONOSCOPY WITH ANESTHESIA;  Surgeon: Arsalan Lorenzo DO;  Location: [facility] ENDOSCOPY;  Service: Gastroenterology;  Laterality: N/A;  pre op constipation/diarrhea  post poor prep  pcp Del Shetty    COLONOSCOPY N/A 7/2/2024    Procedure: COLONOSCOPY WITH ANESTHESIA;  Surgeon: Agapito Christopher MD;  Location: [facility] ENDOSCOPY;  Service: Gastroenterology;  Laterality: N/A;  pre rectal bleeding  post poor prep  pcp Del Shetty MD    COLONOSCOPY W/ POLYPECTOMY  03/04/2014    Hyperplastic polyp    CORONARY ARTERY BYPASS GRAFT  10/2015    ENDOSCOPY  04/13/2011    Gastritis with hemorrhage    ENDOSCOPY N/A 05/05/2017    Normal exam    ENDOSCOPY N/A 02/11/2019    Gastritis    ENDOSCOPY N/A 09/01/2020    Non-erosive gastritis with hemorrhage    ENDOSCOPY N/A 02/10/2021    Esophagitis    ENDOSCOPY N/A 04/11/2024  "   There were esophageal mucosal changes suspicious for short-segment Low's esophagus present in the distal esophagus. The maximum longitudinal extent of these mucosal changes was 2 cm in length. Mucosa was biopsied with a cold forceps for histologyDistal esophagus, biopsies: Mild chronic active esophagogastritis. No evidence of intestinal metaplasia, dysplasia. Antrum, bx, Mild chronic gastritis    FOOT SURGERY Left     INCISION AND DRAINAGE OF WOUND Left 09/2007    spider bite       Social and Family History     Family History:  family history includes Alzheimer's disease in his mother; Colon cancer in his father and sister; Colon polyps in his sister; Coronary artery disease in his sister and sister; Heart disease in his father.    Tobacco/Social History:  reports that he quit smoking about 33 years ago. His smoking use included cigarettes. He has never used smokeless tobacco. He reports that he does not drink alcohol and does not use drugs.    Allergies     Allergies:   He is allergic to cefepime, bactrim [sulfamethoxazole-trimethoprim], vancomycin, zolpidem, and metronidazole.    Allergies   Allergen Reactions    Cefepime Hives and Anaphylaxis    Bactrim [Sulfamethoxazole-Trimethoprim] Other (See Comments)     \"RENAL FAILURE\"    Vancomycin Itching    Zolpidem Mental Status Change     \"makes him crazy\"    Metronidazole Rash       Labs     Basic Labs:  Common labs          7/3/2024    08:12 7/25/2024    10:18 7/31/2024    18:49 7/31/2024    20:30   Common Labs   Glucose 115  59  704  653    BUN 22  43  55  54    Creatinine 1.54  2.24  2.67  2.64    Sodium 143  144  132  133    Potassium 3.5  3.4  4.5  4.4    Chloride 104  106  97  98    Calcium 8.8  9.2  9.0  8.9    Albumin  3.8  3.5     Total Bilirubin  0.3  0.6     Alkaline Phosphatase  84  101     AST (SGOT)  11  11     ALT (SGPT)  7  8     WBC  8.92  15.48     Hemoglobin  11.2  9.8     Hematocrit  34.9  29.6     Platelets  223  201     Total Cholesterol  " 168      Triglycerides  69      HDL Cholesterol  57      LDL Cholesterol   98      Hemoglobin A1C  11.60  12.60     Microalbumin, Urine  73.7      PSA  0.403          Diabetic:  A1C Last 3 Results          5/3/2024    16:22 7/25/2024    10:18 7/31/2024    18:49   HGBA1C Last 3 Results   Hemoglobin A1C 11.20  11.60  12.60        Inpatient Medications     Medications: Scheduled Meds:enoxaparin, 1 mg/kg, Subcutaneous, Q12H  sodium chloride, 10 mL, Intravenous, Q12H  vancomycin, 2,500 mg, Intravenous, Once   Followed by  [START ON 8/1/2024] vancomycin, 750 mg, Intravenous, Q24H      Continuous Infusions:dextrose 5 % and sodium chloride 0.45 %, 150 mL/hr  dextrose 5 % and sodium chloride 0.45 % with KCl 20 mEq/L, 150 mL/hr  dextrose 5 % and sodium chloride 0.45 % with KCl 40 mEq/L, 150 mL/hr  dextrose 5 % and sodium chloride 0.9 %, 150 mL/hr  dextrose 5 % and sodium chloride 0.9 % with KCl 20 mEq, 150 mL/hr  dextrose 5% and sodium chloride 0.9% with KCl 40 mEq/L, 150 mL/hr  dilTIAZem, 5-15 mg/hr, Last Rate: 5 mg/hr (07/31/24 2031)  insulin, 0-100 Units/hr, Last Rate: 3.9 Units/hr (07/31/24 2239)  Pharmacy to Dose enoxaparin (LOVENOX),   sodium chloride 0.45 % 1,000 mL with potassium chloride 40 mEq infusion, 250 mL/hr  sodium chloride, 250 mL/hr  sodium chloride 0.45 % with KCl 20 mEq, 250 mL/hr  sodium chloride, 250 mL/hr  sodium chloride 0.9 % with KCl 20 mEq, 250 mL/hr  sodium chloride 0.9 % with KCl 40 mEq/L, 250 mL/hr      PRN Meds:.  Calcium Replacement - Follow Nurse / BPA Driven Protocol    dextrose    dextrose    dextrose 5 % and sodium chloride 0.45 %    dextrose 5 % and sodium chloride 0.45 % with KCl 20 mEq/L    dextrose 5 % and sodium chloride 0.45 % with KCl 40 mEq/L    dextrose 5 % and sodium chloride 0.9 %    dextrose 5 % and sodium chloride 0.9 % with KCl 20 mEq    dextrose 5% and sodium chloride 0.9% with KCl 40 mEq/L    glucagon (human recombinant)    Magnesium Standard Dose Replacement - Follow Nurse  "/ BPA Driven Protocol    Pharmacy to Dose enoxaparin (LOVENOX)    Phosphorus Replacement - Follow Nurse / BPA Driven Protocol    Potassium Replacement - Follow Nurse / BPA Driven Protocol    sodium chloride 0.45 % 1,000 mL with potassium chloride 40 mEq infusion    sodium chloride    sodium chloride 0.45 % with KCl 20 mEq    sodium chloride    sodium chloride    sodium chloride    sodium chloride 0.9 % with KCl 20 mEq    sodium chloride 0.9 % with KCl 40 mEq/L    I have reviewed the patient's current medications.   Outpatient Medications     Outpatient Medications:   No outpatient medications have been marked as taking for the 7/31/24 encounter (Hospital Encounter).       Current Antibiotics     vancomycin  vancomycin (VANCOCIN) IVPB in 500 mL (1750 mg or greater)    Exam     Vitals: His  height is 182.9 cm (72\") and weight is 132 kg (290 lb). His oral temperature is 99.5 °F (37.5 °C). His blood pressure is 100/53 and his pulse is 82. His respiration is 21 and oxygen saturation is 97%.     GENERAL: Confused, no acute distress.   SKIN:  Warm, dry  EYES:  Pupils equal, round and reactive to light.  EOMs intact.    HEAD:  Normocephalic.  NECK:  Supple   RESP:  Lungs clear to auscultation. Good airflow. Normal respiratory effort.   CARDIAC:  AFIB rate controlled 80 bpm, on cardizem drip currently. Normal S1,S2. No edema  GI: protuberant, distended, soft, tender throughout.  MSK:  Normal muscle bulk, tone  NEUROLOGICAL:  No focal neurological deficits. Follows commands  PSYCHIATRIC:  Normal affect and mood.  Alert to person and place, disoriented to time or reason for hospitalization or how he arrived to facility.     Results and Cultures Review     Result Review:  I have personally reviewed the results from the time of this admission to 7/31/2024 22:46 CDT and agree with these findings:  [x]  Laboratory list / accordion  [x]  Microbiology  []  Radiology  [x]  EKG/Telemetry   [x]  Cardiology/Vascular   []  Pathology  [x] " " Old records  []  Other:  Most notable findings include:  see HPI      Culture Data:   No results found for: \"BLOODCX\", \"URINECX\", \"WOUNDCX\", \"MRSACX\", \"RESPCX\", \"STOOLCX\"    Assessment/Plan   This is a 69-year-old male who [facility] for altered mental status.  PMHx DM type II, atrial fibrillation, CAD, chronic poor healing diabetic left foot ulcer with osteomyelitis, diabetic polyneuropathy, hypertension, hyperlipidemia insomnia, CKD stage IV, BRITTNI-CPAP, cervical spine stenosis, and vitamin D deficiency with numerous hospitalizations this year. PT was found to be confused in  AFIB-RVR, DKA, and open wound to left heal concerning acute infection with osteomyelitis. Critical care team asked to further manage acute illness.     Patient will be admitted under intensivist MD, Dr. Jamison, case will be discussed in the AM.  Further recommendations and/or modifications to the treatment plan are ultimately at his discretion.    Treatment Plan    AFIB-RVR  -chronic history of atrial fibrillation. On review of PMHx records he was prescribed and taking Eliquis, which has been held due to GI bleeding after which he was suppose to f/u with cardiology about restarting. Epic shows cancelled f/u visits with cardiology, medication is currently not on patients med list.  -Lovenox AFIB coverage ordered in ED.  -Cardizem drip started in ED, rate controlled currently 80-90 bpm.   -likely rapid ventricular response 2/2 acute illness and probable underlying infection.  -Plan to DC Cardizem and transition back to his home dose BB  -elevated troponin in  delta 353, likely type II MI, cardiology consulted in ER, we appreciate their continued input.    ?Diabetic ketoacidosis  -History DM type 2 insulin-dependent  -small amount of acetone, ketones in urine  -do not have beta hydroxybutyrate at this facility  -BS at arrival 704  -normal anion gap  -normal pH 7.41  -Insulin drip started in ER will discontinue and " transition to SSI  -likely HHS in the setting of acute illness    Urinary Tract Infection  -urinalysis shows nitrate positive, and +2 bacteria  -Hx of ESBL 6/30/24  -Urine culture pending  -Started on Zosyn/Vanco in the ER will continue  -ID consult    Diabetic foot ulcer  -chronic open wound, DC ulcer stage 2-3 to left heal with larger area soft pale tissue with surrounding edema/erythema and weeping at the outer aspect of the foot along the area of the 5th metatarsal.   -Hx of osteomyelitis and related MRSA bacteremia   -Zosyn/Vanco started in ED will continue   -podiatry consult  -wound care consult    Altered mental status  -likely related to acute illness/metabolic encephalopathy.  -cannot r/u stroke. No CT head in ER. No focal neuro deficits  -CT head ordered-results pending     Abdominal pain  -reported nausea/vomiting. Abdominal distention and tender on exam  -Hx of pancreatitis  -previous appendectomy  -Hx of GI bleeding, no active bleeding noted  -No CT abd/pel in ER  -CT abd/pel with contrast ordered-results pending    CKD-IV  -bun/creatinine at baseline  -monitor lytes and urine output   -avoid neph toxic medications      VTE Prophylaxis:    Pharmacologic VTE prophylaxis orders are present.        Total critical care time: Approximately 65 minutes    Due to a high probability of clinically significant, life threatening deterioration, the patient required my highest level of preparedness to intervene emergently and I personally spent this critical care time directly and personally managing the patient.     This critical care time included obtaining a history; examining the patient; pulse oximetry; ordering and review of studies; arranging urgent treatment with development of a management plan; evaluation of patient's response to treatment; frequent reassessment; and, discussions with other providers.    This critical care time was performed to assess and manage the high probability of imminent,  life-threatening deterioration that could result in multi-organ failure. It was exclusive of separately billable procedures and treating other patients and teaching time.    Please see MDM section and the rest of the note for further information on patient assessment and treatment.    Part of this note may be an electronic transcription/translation of spoken language to printed text using the Dragon Dictation System.    Electronically signed by SUSAN Richter on 7/31/2024 at 22:46 CDT    Electronically signed by Keren Jamison MD at 08/02/24 1654          Physician Progress Notes (most recent note)        Alejandrina Barnett DO at 08/08/24 1102              [facility] Medicine Services  INPATIENT PROGRESS NOTE    Patient Name: Erick Luong  Date of Admission: 7/31/2024  Today's Date: 08/08/24  Length of Stay: 8  Primary Care Physician: Del Shetty MD    Subjective   Chief Complaint: diabetic foot ulcer.   HPI     Patient lying in bed.   No new complaints.   Awaiting nursing home for continued wound care.    No new problems         Review of Systems   All pertinent negatives and positives are as above. All other systems have been reviewed and are negative unless otherwise stated.     Objective    Temp:  [96.8 °F (36 °C)-98.4 °F (36.9 °C)] 96.8 °F (36 °C)  Heart Rate:  [69-77] 69  Resp:  [16] 16  BP: (131-147)/(58-73) 131/71  Physical Exam  Vitals and nursing note reviewed.   Constitutional:       Appearance: He is obese.   HENT:      Head: Normocephalic and atraumatic.      Right Ear: External ear normal.      Left Ear: External ear normal.      Nose: Nose normal.      Mouth/Throat:      Mouth: Mucous membranes are moist.   Eyes:      Extraocular Movements: Extraocular movements intact.      Conjunctiva/sclera: Conjunctivae normal.      Pupils: Pupils are equal, round, and reactive to light.   Cardiovascular:      Rate and Rhythm: Normal rate and regular rhythm.       Pulses: Normal pulses.      Heart sounds: Normal heart sounds.   Pulmonary:      Effort: Pulmonary effort is normal.   Abdominal:      General: Bowel sounds are normal.      Palpations: Abdomen is soft.   Musculoskeletal:      Cervical back: Normal range of motion.      Comments: Moves all extremities   Skin:     General: Skin is warm and dry.      Capillary Refill: Capillary refill takes less than 2 seconds.      Comments: Dressing left foot/heel intact.   Neurological:      Mental Status: He is alert and oriented to person, place, and time.   Psychiatric:      Comments: Affect  is flat.             File Link    Scan on 8/7/2024 1702 by Aicha Smallwood RN: Wound Left lateral foot Diabetic Ulcer        Key Information    Document ID File Type Document Type Description   [id number] Image WOUND IMAGE Wound Left lateral foot Diabetic Ulcer     Import Information    Attached At Date Time User Dept   Encounter Level 8/7/2024  5:02 PM Aicha Smallwood RN [facility] 3[facility]     Encounter    Hospital Encounter on 7/31/24             Results Review:  I have reviewed the labs, radiology results, and diagnostic studies.    Laboratory Data:   Results from last 7 days   Lab Units 08/08/24  0449 08/05/24  0444 08/04/24  0615   WBC 10*3/mm3 8.08 5.89 5.73   HEMOGLOBIN g/dL 9.6* 9.5* 9.5*   HEMATOCRIT % 30.5* 30.1* 30.5*   PLATELETS 10*3/mm3 242 210 201        Results from last 7 days   Lab Units 08/08/24  0449 08/07/24  0758 08/06/24  0508 08/05/24  0444 08/04/24  0615 08/03/24  0605   SODIUM mmol/L 141 144 143 144 145 143   POTASSIUM mmol/L 3.8 3.9 4.1 3.9 3.9 3.8   CHLORIDE mmol/L 109* 110* 109* 112* 111* 110*   CO2 mmol/L 21.0* 20.0* 22.0 21.0* 21.0* 19.0*   BUN mg/dL 15 15 18 20 24* 31*   CREATININE mg/dL 1.42* 1.50* 1.47* 1.47* 1.60* 1.82*   CALCIUM mg/dL 8.5* 8.6 8.4* 8.3* 8.5* 8.3*   BILIRUBIN mg/dL  --   --   --  0.3 0.3 0.3   ALK PHOS U/L  --   --   --  102 95 88   ALT (SGPT) U/L  --   --   --  9 9 7   AST (SGOT)  U/L  --   --   --  16 15 12   GLUCOSE mg/dL 110* 97 101* 89 97 125*       Culture Data:   Blood Culture   Date Value Ref Range Status   07/31/2024 No growth at 5 days  Final   07/31/2024 Staphylococcus, coagulase negative (C)  Final     Urine Culture   Date Value Ref Range Status   07/31/2024 >100,000 CFU/mL Escherichia coli ESBL (A)  Final     Wound Culture   Date Value Ref Range Status   08/02/2024 (A)  Final    Light growth (2+) Streptococcus agalactiae (Group B)     Comment:       This organism is considered to be universally susceptible to penicillin.  No further antibiotic testing will be performed. If Clindamycin or Erythromycin is the drug of choice, notify the laboratory within 7 days to request susceptibility testing.   08/02/2024 (A)  Final    Light growth (2+) Streptococcus agalactiae (Group B)     Comment:       This organism is considered to be universally susceptible to penicillin.  No further antibiotic testing will be performed. If Clindamycin or Erythromycin is the drug of choice, notify the laboratory within 7 days to request susceptibility testing.       Radiology Data:   Imaging Results (Last 24 Hours)       ** No results found for the last 24 hours. **            I have reviewed the patient's current medications.     Assessment/Plan   Assessment  Active Hospital Problems    Diagnosis     **DKA, type 2, not at goal     E. coli UTI, ESBL     Atrial fibrillation     Chronic heart failure with preserved ejection fraction (HFpEF)     Chronic diastolic heart failure     GERD without esophagitis     Diabetic ulcer of left foot associated with type 2 diabetes mellitus        Treatment Plan  Discussed with the patient the need for continued care and treatment of the left foot wound, again today.  Patient is unable to take care of himself effectively at home.  I have recommended to the patient again skilled nursing until such a time that the wound is healed.  We discussed that if he does not he will most  likely lose the foot secondary to the wound with worsening of wound and infections.   Awaiting placement     Medical Decision Making  Number and Complexity of problems:   3 acute, high complexity problems  4+ chronic, moderate complexity problems     Differential Diagnosis: None     Conditions and Status        Condition is improving.     [facility] Data  External documents reviewed: Care Everywhere  Cardiac tracing (EKG, telemetry) interpretation: See HPI  Radiology interpretation: See HPI  Labs reviewed: See HPI  Any tests that were considered but not ordered: None     Decision rules/scores evaluated (example [id number], [place], etc): None     Discussed with: The patient     Care Planning  Shared decision making: Patient  Code status and discussions: Full code     Disposition  Social Determinants of Health that impact treatment or disposition: none     I expect the patient to be discharged to SNF  in 1-2 days.    Electronically signed by Alejandrina Barnett DO, 24, 11:02 CDT.      Electronically signed by Alejandrina Barnett DO at 24 1106          Physical Therapy Notes (most recent note)        Alesha Dominguez PTA at 24 1626  Version 1 of 1         Acute Care - Physical Therapy Treatment Note   [place]     Patient Name: Erick Luong  : 1955  MRN: [id number]  Today's Date: 2024      Visit Dx:     ICD-10-CM ICD-9-CM   1. Diabetic foot infection  E11.628 250.80    L08.9 686.9   2. Osteomyelitis of foot, unspecified laterality, unspecified type  M86.9 730.27   3. Diabetic ketoacidosis without coma associated with type 2 diabetes mellitus  E11.10 250.12   4. Elevated troponin  R79.89 790.6   5. Atrial fibrillation with RVR  I48.91 427.31   6. Confusion  R41.0 298.9   7. Impaired mobility [Z74.09]  Z74.09 799.89     Patient Active Problem List   Diagnosis    Obesity, unspecified obesity severity, unspecified obesity type    Essential hypertension    Type 2 diabetes mellitus with  hyperglycemia, with long-term current use of insulin    Nonsmoker    Anemia due to chronic kidney disease    Class 3 severe obesity due to excess calories with body mass index (BMI) of 40.0 to 44.9 in adult    Anasarca    Sleep apnea with use of continuous positive airway pressure (CPAP)    Medically noncompliant    Diabetic ulcer of left foot associated with type 2 diabetes mellitus    Diabetic polyneuropathy associated with type 2 diabetes mellitus    Spinal stenosis in cervical region    Degeneration of cervical intervertebral disc    Cervical radiculopathy    Degeneration of lumbar or lumbosacral intervertebral disc    Cervical myelopathy    Bilateral carpal tunnel syndrome    CAD (coronary artery disease)    GERD without esophagitis    BPH without obstruction/lower urinary tract symptoms    Stage 3b chronic kidney disease    Chronic diastolic heart failure    Type 2 myocardial infarction due to heart failure    Left carpal tunnel syndrome    Syncope and collapse, recurrent episodes    Poorly-controlled hypertension    Rhinovirus    Peripheral vascular disease    Chronic kidney disease (CKD), stage IV (severe)    Diabetic foot infection    Sepsis    Epigastric pain    Chronic heart failure with preserved ejection fraction (HFpEF)    Sepsis due to methicillin resistant Staphylococcus aureus (MRSA) with encephalopathy without septic shock    Slow transit constipation    CHF exacerbation    CHF (congestive heart failure), NYHA class I, acute on chronic, combined    Rectal bleeding    Acute on chronic heart failure with preserved ejection fraction (HFpEF)    DKA, type 2, not at goal    Atrial fibrillation    E. coli UTI, ESBL     Past Medical History:   Diagnosis Date    Arthritis     Autonomic disease     CAD (coronary artery disease) 02/06/2017    Cervical radiculopathy 09/16/2021    Chronic constipation with acute exaccerbation 05/10/2021    Coronary artery disease     Degeneration of cervical intervertebral disc  08/11/2021    Diabetes mellitus     Diabetic foot ulcer 08/31/2020    Diabetic polyneuropathy associated with type 2 diabetes mellitus 01/18/2021    Elevated cholesterol     Gastroesophageal reflux disease 05/13/2019    Headache     HTN (hypertension), benign 05/03/2017    Hyperlipidemia     Hypertension     Mixed hyperlipidemia 02/07/2017    Multiple lung nodules 01/26/2020    5mm, 9 mm RLL identified 1/2020, not present 10/2019.    Myocardial infarction     Osteomyelitis 01/22/2020    Osteomyelitis of fifth toe of right foot 10/07/2019    Pancreatitis     Persistent insomnia 01/20/2020    Renal disorder     Sleep apnea 02/06/2017    Sleep apnea with use of continuous positive airway pressure (CPAP)     NON-COMPLIANT    Slow transit constipation 01/16/2019    Spinal stenosis in cervical region 09/16/2021    Vitamin D deficiency 03/02/2021     Past Surgical History:   Procedure Laterality Date    ABDOMINAL SURGERY      AMPUTATION FOOT / TOE Left 10/2021    5th digit     ANTERIOR CERVICAL DISCECTOMY W/ FUSION N/A 08/05/2022    Procedure: CERVICAL DISCECTOMY ANTERIOR WITH FUSION C5-6 with possible plating of C5-7 with neuromonitoring and 1 c-arm;  Surgeon: Karel Soliz MD;  Location:  [facility] OR;  Service: Neurosurgery;  Laterality: N/A;    APPENDECTOMY      BACK SURGERY      CARDIAC CATHETERIZATION Left 02/08/2021    Procedure: Left Heart Cath w poss intervention left anatomical snuff box acess;  Surgeon: Omkar Charles DO;  Location:  [facility] CATH INVASIVE LOCATION;  Service: Cardiology;  Laterality: Left;    CARDIAC SURGERY      CATARACT EXTRACTION      CERVICAL SPINE SURGERY      COLONOSCOPY N/A 01/31/2017    Normal exam repeat in 5 years    COLONOSCOPY N/A 02/11/2019    Mild acute inflammation    COLONOSCOPY N/A 04/07/2024    2 areas at 10 and 20 cm with friability ulceration 2 clips placed at 20 cm and 4 clips at 10 cm poor prep normal mucosa,mild eroisions and ulcerations in visible vessels     "COLONOSCOPY N/A 7/1/2024    Procedure: COLONOSCOPY WITH ANESTHESIA;  Surgeon: Arsalan Lorenzo DO;  Location:  [facility];  Service: Gastroenterology;  Laterality: N/A;  pre op constipation/diarrhea  post poor prep  pcp Del Shetty    COLONOSCOPY N/A 7/2/2024    Procedure: COLONOSCOPY WITH ANESTHESIA;  Surgeon: Agapito Christopher MD;  Location:  [facility];  Service: Gastroenterology;  Laterality: N/A;  pre rectal bleeding  post poor prep  pcp Del Shetty MD    COLONOSCOPY W/ POLYPECTOMY  03/04/2014    Hyperplastic polyp    CORONARY ARTERY BYPASS GRAFT  10/2015    ENDOSCOPY  04/13/2011    Gastritis with hemorrhage    ENDOSCOPY N/A 05/05/2017    Normal exam    ENDOSCOPY N/A 02/11/2019    Gastritis    ENDOSCOPY N/A 09/01/2020    Non-erosive gastritis with hemorrhage    ENDOSCOPY N/A 02/10/2021    Esophagitis    ENDOSCOPY N/A 04/11/2024    There were esophageal mucosal changes suspicious for short-segment Low's esophagus present in the distal esophagus. The maximum longitudinal extent of these mucosal changes was 2 cm in length. Mucosa was biopsied with a cold forceps for histologyDistal esophagus, biopsies: Mild chronic active esophagogastritis. No evidence of intestinal metaplasia, dysplasia. Antrum, bx, Mild chronic gastritis    FOOT SURGERY Left     INCISION AND DRAINAGE OF WOUND Left 09/2007    spider bite     PT Assessment (Last 12 Hours)       PT Evaluation and Treatment       Row Name 08/07/24 1445 08/07/24 1140       Physical Therapy Time and Intention    Subjective Information complains of;weakness;fatigue  -LY --    Document Type therapy note (daily note)  -LY --    Mode of Treatment physical therapy  -LY physical therapy  -LY    Session Not Performed -- patient/family declined treatment  -LY    Comment, Session Not Performed -- pt reports he is not in the mood, \"they are sending me to nursing home\"  -LY      Row Name 08/07/24 1441          General Information    Existing " Precautions/Restrictions fall;other (see comments)  wound on L heel, surgical shoe  -LY       Row Name 08/07/24 1445          Pain    Pretreatment Pain Rating 0/10 - no pain  -LY     Posttreatment Pain Rating 0/10 - no pain  -LY       Row Name 08/07/24 1445          Bed Mobility    Comment, (Bed Mobility) chair  -LY       Row Name 08/07/24 1445          Sit-Stand Transfer    Sit-Stand [place] (Transfers) verbal cues;contact guard  -LY       Row Name 08/07/24 1445          Stand-Sit Transfer    Stand-Sit [place] (Transfers) verbal cues;contact guard  -LY       Row Name 08/07/24 1445          Gait/Stairs (Locomotion)    [place] Level (Gait) verbal cues;contact guard  -LY     Assistive Device (Gait) walker, front-wheeled  -LY     Distance in Feet (Gait) 50  x3  -LY     Deviations/Abnormal Patterns (Gait) gait speed decreased;stride length decreased  -LY     Bilateral Gait Deviations forward flexed posture;heel strike decreased  -LY       Row Name             Wound 08/22/23 0140 Left posterior plantar    Wound - Properties Group Placement Date: 08/22/23  -AM Placement Time: 0140  -AM Present on Original Admission: Y  -AM Side: Left  -AM Orientation: posterior  -AM Location: plantar  -AM    Retired Wound - Properties Group Placement Date: 08/22/23  -AM Placement Time: 0140  -AM Present on Original Admission: Y  -AM Side: Left  -AM Orientation: posterior  -AM Location: plantar  -AM    Retired Wound - Properties Group Date first assessed: 08/22/23  -AM Time first assessed: 0140  -AM Present on Original Admission: Y  -AM Side: Left  -AM Location: plantar  -AM      Row Name             Wound Left lateral foot Diabetic Ulcer    Wound - Properties Group Side: Left  -MH Orientation: lateral  -MH Location: foot  -JACIEL, 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;diabetic foot ulcer/gangrene  Primary Wound Type: Diabetic ulc  -JACIEL Wound Outcome: Amputation  -JACIEL Stage, Pressure Injury : other (see comments)  -JACIEL, full thickness starting  10/20/21     Retired Wound - Properties Group Side: Left  -MH Orientation: lateral  -MH Location: foot  -JACIEL, 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;diabetic foot ulcer/gangrene  Primary Wound Type: Diabetic ulc  -JACIEL Stage, Pressure Injury : other (see comments)  -JACIEL, full thickness starting 10/20/21  Wound Outcome: Amputation  -JACIEL    Retired Wound - Properties Group Side: Left  -MH Location: foot  -JACIEL, 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;diabetic foot ulcer/gangrene  Primary Wound Type: Diabetic ulc  -JACIEL Wound Outcome: Amputation  -JACIEL      Row Name             Wound 06/15/23 0102 Left anterior plantar    Wound - Properties Group Placement Date: 06/15/23  -SM Placement Time: 0102 -SM Side: Left  -SM Orientation: anterior  -SM Location: plantar  -SM    Retired Wound - Properties Group Placement Date: 06/15/23  -SM Placement Time: 0102 -SM Side: Left  -SM Orientation: anterior  -SM Location: plantar  -SM    Retired Wound - Properties Group Date first assessed: 06/15/23  -SM Time first assessed: 0102 -SM Side: Left  -SM Location: plantar  -SM      Row Name             Wound 04/04/24 2100 Left posterior wrist Skin Tear    Wound - Properties Group Placement Date: 04/04/24  -NR Placement Time: 2100  -NR Present on Original Admission: N  -NR Side: Left  -NR Orientation: posterior  -NR Location: wrist  -NR Primary Wound Type: Skin tear  -NR Additional Comments: likely caused by wristband. Band removed, site cleaned with alcohol swab, wraped in gauze  -NR    Retired Wound - Properties Group Placement Date: 04/04/24  -NR Placement Time: 2100  -NR Present on Original Admission: N  -NR Side: Left  -NR Orientation: posterior  -NR Location: wrist  -NR Primary Wound Type: Skin tear  -NR Additional Comments: likely caused by wristband. Band removed, site cleaned with alcohol swab, wraped in gauze  -NR    Retired Wound - Properties Group Date first assessed: 04/04/24  -NR Time first assessed: 2100  -NR Present on Original Admission: N  -NR Side: Left  -NR  Location: wrist  -NR Primary Wound Type: Skin tear  -NR Additional Comments: likely caused by wristband. Band removed, site cleaned with alcohol swab, wraped in gauze  -NR      Row Name 08/07/24 1445          Plan of Care Review    Plan of Care Reviewed With patient  -LY     Progress improving  -LY       Row Name 08/07/24 1445          Positioning and Restraints    Pre-Treatment Position sitting in chair/recliner  -LY     Post Treatment Position bed  -LY     In Bed sitting EOB;call light within reach;encouraged to call for assist  -LY               User Key  (r) = Recorded By, (t) = Taken By, (c) = Cosigned By      Initials Name Provider Type    Yuliana Lisa RN Registered Nurse    MH Haase, Mallory L, RN Registered Nurse    Alesha Armenta, PARUL Physical Therapist Assistant    Faviola Kunz RN Registered Nurse    Michelle Diaz RN Registered Nurse    Reuben Johnson RN Registered Nurse                    Physical Therapy Education       Title: PT OT SLP Therapies (In Progress)       Topic: Physical Therapy (Done)       Point: Mobility training (Done)       Learning Progress Summary             Patient Acceptance, E,D, DU,VU by  at 8/4/2024 1457    Comment: benefits of PT and POC, call for A OOB                         Point: Precautions (Done)       Learning Progress Summary             Patient Acceptance, E,D, DU,VU by  at 8/4/2024 1457    Comment: benefits of PT and POC, call for A OOB                                         User Key       Initials Effective Dates Name Provider Type Discipline     02/03/23 -  Daniel Garcia, PT Physical Therapist PT                  PT Recommendation and Plan     Plan of Care Reviewed With: patient  Progress: improving  Outcome Evaluation: PT tx completed. Pt in bed and agrees to therapy. S for bed mobility. Required assist for donning of L surgical shoe. No c/o pain. Pt stands with CGA. Amb 50' at a time with RWX and CGA. As pt fatigeus he  requires more cues for safe gait mechanics. Noted L decreased foot clearance at times. Will cont to follow.       Time Calculation:    PT Charges       Row Name 24 1625             Time Calculation    Start Time 1445  -LY      Stop Time 1500  -LY      Time Calculation (min) 15 min  -LY      PT Received On 24  -LY         Time Calculation- PT    Total Timed Code Minutes- PT 15 minute(s)  -LY         Timed Charges    71658 - Gait Training Minutes  15  -LY         Total Minutes    Timed Charges Total Minutes 15  -LY       Total Minutes 15  -LY                User Key  (r) = Recorded By, (t) = Taken By, (c) = Cosigned By      Initials Name Provider Type    LY Alesha Dominguez PTA Physical Therapist Assistant                  Therapy Charges for Today       Code Description Service Date Service Provider Modifiers Qty    49388959460 HC GAIT TRAINING EA 15 MIN 2024 Alesha Dominguez PTA GP 1    09333053226 HC GAIT TRAINING EA 15 MIN 2024 Alesha Dominguez PTA GP 1            PT G-Codes  Outcome Measure Options: AM-PAC 6 Clicks Daily Activity (OT)  AM-PAC 6 Clicks Score (PT): 21  AM-PAC 6 Clicks Score (OT): 15    Alesha Dominguez PTA  2024      Electronically signed by Alesha Dominguez PTA at 24 1627          Occupational Therapy Notes (most recent note)        Matilda Bryant OTR/L at 24 1505          Acute Care - Occupational Therapy Initial Evaluation  [facility]     Patient Name: Erick Luong  : 1955  MRN: [id number]  Today's Date: 2024     Date of Referral to OT: 24       Admit Date: 2024       ICD-10-CM ICD-9-CM   1. Diabetic foot infection  E11.628 250.80    L08.9 686.9   2. Osteomyelitis of foot, unspecified laterality, unspecified type  M86.9 730.27   3. Diabetic ketoacidosis without coma associated with type 2 diabetes mellitus  E11.10 250.12   4. Elevated troponin  R79.89 790.6   5. Atrial fibrillation with RVR  I48.91 427.31   6. Confusion  R41.0 298.9   7.  Impaired mobility [Z74.09]  Z74.09 799.89     Patient Active Problem List   Diagnosis    Obesity, unspecified obesity severity, unspecified obesity type    Essential hypertension    Type 2 diabetes mellitus with hyperglycemia, with long-term current use of insulin    Nonsmoker    Anemia due to chronic kidney disease    Class 3 severe obesity due to excess calories with body mass index (BMI) of 40.0 to 44.9 in adult    Anasarca    Sleep apnea with use of continuous positive airway pressure (CPAP)    Medically noncompliant    Diabetic ulcer of left foot associated with type 2 diabetes mellitus    Diabetic polyneuropathy associated with type 2 diabetes mellitus    Spinal stenosis in cervical region    Degeneration of cervical intervertebral disc    Cervical radiculopathy    Degeneration of lumbar or lumbosacral intervertebral disc    Cervical myelopathy    Bilateral carpal tunnel syndrome    CAD (coronary artery disease)    GERD without esophagitis    BPH without obstruction/lower urinary tract symptoms    Stage 3b chronic kidney disease    Chronic diastolic heart failure    Type 2 myocardial infarction due to heart failure    Left carpal tunnel syndrome    Syncope and collapse, recurrent episodes    Poorly-controlled hypertension    Rhinovirus    Peripheral vascular disease    Chronic kidney disease (CKD), stage IV (severe)    Diabetic foot infection    Sepsis    Epigastric pain    Chronic heart failure with preserved ejection fraction (HFpEF)    Sepsis due to methicillin resistant Staphylococcus aureus (MRSA) with encephalopathy without septic shock    Slow transit constipation    CHF exacerbation    CHF (congestive heart failure), NYHA class I, acute on chronic, combined    Rectal bleeding    Acute on chronic heart failure with preserved ejection fraction (HFpEF)    DKA, type 2, not at goal    Atrial fibrillation    E. coli UTI, ESBL     Past Medical History:   Diagnosis Date    Arthritis     Autonomic disease      CAD (coronary artery disease) 02/06/2017    Cervical radiculopathy 09/16/2021    Chronic constipation with acute exaccerbation 05/10/2021    Coronary artery disease     Degeneration of cervical intervertebral disc 08/11/2021    Diabetes mellitus     Diabetic foot ulcer 08/31/2020    Diabetic polyneuropathy associated with type 2 diabetes mellitus 01/18/2021    Elevated cholesterol     Gastroesophageal reflux disease 05/13/2019    Headache     HTN (hypertension), benign 05/03/2017    Hyperlipidemia     Hypertension     Mixed hyperlipidemia 02/07/2017    Multiple lung nodules 01/26/2020    5mm, 9 mm RLL identified 1/2020, not present 10/2019.    Myocardial infarction     Osteomyelitis 01/22/2020    Osteomyelitis of fifth toe of right foot 10/07/2019    Pancreatitis     Persistent insomnia 01/20/2020    Renal disorder     Sleep apnea 02/06/2017    Sleep apnea with use of continuous positive airway pressure (CPAP)     NON-COMPLIANT    Slow transit constipation 01/16/2019    Spinal stenosis in cervical region 09/16/2021    Vitamin D deficiency 03/02/2021     Past Surgical History:   Procedure Laterality Date    ABDOMINAL SURGERY      AMPUTATION FOOT / TOE Left 10/2021    5th digit     ANTERIOR CERVICAL DISCECTOMY W/ FUSION N/A 08/05/2022    Procedure: CERVICAL DISCECTOMY ANTERIOR WITH FUSION C5-6 with possible plating of C5-7 with neuromonitoring and 1 c-arm;  Surgeon: Karel Soliz MD;  Location:  [facility] OR;  Service: Neurosurgery;  Laterality: N/A;    APPENDECTOMY      BACK SURGERY      CARDIAC CATHETERIZATION Left 02/08/2021    Procedure: Left Heart Cath w poss intervention left anatomical snuff box acess;  Surgeon: Omkar Charles DO;  Location:  [facility] CATH INVASIVE [facility];  Service: Cardiology;  Laterality: Left;    CARDIAC SURGERY      CATARACT EXTRACTION      CERVICAL SPINE SURGERY      COLONOSCOPY N/A 01/31/2017    Normal exam repeat in 5 years    COLONOSCOPY N/A 02/11/2019    Mild acute  inflammation    COLONOSCOPY N/A 04/07/2024    2 areas at 10 and 20 cm with friability ulceration 2 clips placed at 20 cm and 4 clips at 10 cm poor prep normal mucosa,mild eroisions and ulcerations in visible vessels    COLONOSCOPY N/A 7/1/2024    Procedure: COLONOSCOPY WITH ANESTHESIA;  Surgeon: Arsalan Lorenzo DO;  Location: [facility] ENDOSCOPY;  Service: Gastroenterology;  Laterality: N/A;  pre op constipation/diarrhea  post poor prep  pcp Del Shetty    COLONOSCOPY N/A 7/2/2024    Procedure: COLONOSCOPY WITH ANESTHESIA;  Surgeon: Agapito Christopher MD;  Location: [facility] ENDOSCOPY;  Service: Gastroenterology;  Laterality: N/A;  pre rectal bleeding  post poor prep  pcp Del Shetty MD    COLONOSCOPY W/ POLYPECTOMY  03/04/2014    Hyperplastic polyp    CORONARY ARTERY BYPASS GRAFT  10/2015    ENDOSCOPY  04/13/2011    Gastritis with hemorrhage    ENDOSCOPY N/A 05/05/2017    Normal exam    ENDOSCOPY N/A 02/11/2019    Gastritis    ENDOSCOPY N/A 09/01/2020    Non-erosive gastritis with hemorrhage    ENDOSCOPY N/A 02/10/2021    Esophagitis    ENDOSCOPY N/A 04/11/2024    There were esophageal mucosal changes suspicious for short-segment Low's esophagus present in the distal esophagus. The maximum longitudinal extent of these mucosal changes was 2 cm in length. Mucosa was biopsied with a cold forceps for histologyDistal esophagus, biopsies: Mild chronic active esophagogastritis. No evidence of intestinal metaplasia, dysplasia. Antrum, bx, Mild chronic gastritis    FOOT SURGERY Left     INCISION AND DRAINAGE OF WOUND Left 09/2007    spider bite         OT ASSESSMENT FLOWSHEET (Last 12 Hours)       OT Evaluation and Treatment       Row Name 08/04/24 1415                   OT Time and Intention    Subjective Information complains of;weakness;fatigue;pain;nausea/vomiting  -EC        Document Type evaluation  -EC        Mode of Treatment occupational therapy;co-treatment  -EC           General Information    Patient  Profile Reviewed yes  -EC        Prior Level of Function independent:;all household mobility;feeding;grooming;dressing;bathing  -EC        Pertinent History of Current Functional Problem found wandering at home with hyperglycemia, uncontrolled DM with peripheral neuropathy, BLE edema & L heel wound PMH: a-fib, CAD  -EC        Existing Precautions/Restrictions fall;other (see comments)  wound on L heel, surgical shoe  -EC        Barriers to Rehab medically complex;physical barrier  -EC           Living Environment    Current Living Arrangements home  tub shower  -EC        People in Home spouse;child(julia), dependent  -EC           Home Main Entrance    Number of Stairs, Main Entrance one;none  -EC           Stairs Within Home, Primary    Number of Stairs, Within Home, Primary none  -EC           Pain Assessment    Pretreatment Pain Rating 8/10  -EC        Posttreatment Pain Rating 8/10  -EC        Pain Location generalized  -EC        Pain Location - knee  -EC           Cognition    Orientation Status (Cognition) oriented x 4  -EC           Range of Motion Comprehensive    Comment, General Range of Motion pain with B shoulder AROM past 60%, all other WFL  -EC           Strength Comprehensive (MMT)    Comment, General Manual Muscle Testing (MMT) Assessment B shoulders 2+/5, distal BUE 4/5  -EC           Sensory    Additional Documentation Sensory Assessment (Somatosensory) (Group)  -EC           Sensory Assessment (Somatosensory)    Sensory Assessment (Somatosensory) other (see comments)  n/t in B hands & feet  -EC           Activities of Daily Living    BADL Assessment/Intervention lower body dressing  -EC           Lower Body Dressing Assessment/Training    Hardy Level (Lower Body Dressing) lower body dressing skills;maximum assist (25% patient effort)  -EC        Position (Lower Body Dressing) edge of bed sitting  -EC           BADL Safety/Performance    Impairments, BADL Safety/Performance  balance;endurance/activity tolerance;strength;pain  -EC           Bed Mobility    Bed Mobility supine-sit  -EC        Supine-Sit Norton (Bed Mobility) standby assist  -EC           Functional Mobility    Functional Mobility- Ind. Level contact guard assist  -EC        Functional Mobility- Device other (see comments)  HHAx1  -EC        Functional Mobility- Comment bed to BR & chair  -EC           Transfer Assessment/Treatment    Transfers sit-stand transfer;stand-sit transfer  -EC           Sit-Stand Transfer    Sit-Stand Norton (Transfers) minimum assist (75% patient effort)  -EC           Stand-Sit Transfer    Stand-Sit Norton (Transfers) contact guard  -EC           Safety Issues, Functional Mobility    Safety Issues Affecting Function (Mobility) ability to follow commands  -EC        Impairments Affecting Function (Mobility) balance;endurance/activity tolerance;pain;strength;shortness of breath;sensation/sensory awareness  -EC           Balance    Balance Assessment sitting static balance;sitting dynamic balance;standing static balance;standing dynamic balance  -EC        Static Sitting Balance independent  -EC        Dynamic Sitting Balance standby assist  -EC        Position, Sitting Balance unsupported;sitting edge of bed  -EC        Static Standing Balance contact guard  -EC        Dynamic Standing Balance contact guard  -EC        Comment, Balance unsteady d/t L foot wound  -EC           Wound 08/22/23 0140 Left posterior plantar    Wound - Properties Group Placement Date: 08/22/23  -AM Placement Time: 0140  -AM Present on Original Admission: Y  -AM Side: Left  -AM Orientation: posterior  -AM Location: plantar  -AM    Retired Wound - Properties Group Placement Date: 08/22/23  -AM Placement Time: 0140  -AM Present on Original Admission: Y  -AM Side: Left  -AM Orientation: posterior  -AM Location: plantar  -AM    Retired Wound - Properties Group Date first assessed: 08/22/23  -AM Time first  assessed: 0140  -AM Present on Original Admission: Y  -AM Side: Left  -AM Location: plantar  -AM       Wound Left lateral foot Diabetic Ulcer    Wound - Properties Group Side: Left  -MH Orientation: lateral  -MH Location: foot  -JACIEL, 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;diabetic foot ulcer/gangrene  Primary Wound Type: Diabetic ulc  -JACIEL Wound Outcome: Amputation  -JACIEL Stage, Pressure Injury : other (see comments)  -JACIEL, full thickness starting 10/20/21     Retired Wound - Properties Group Side: Left  -MH Orientation: lateral  -MH Location: foot  -JACIEL, 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;diabetic foot ulcer/gangrene  Primary Wound Type: Diabetic ulc  -JACIEL Stage, Pressure Injury : other (see comments)  -JACIEL, full thickness starting 10/20/21  Wound Outcome: Amputation  -JACIEL    Retired Wound - Properties Group Side: Left  -MH Location: foot  -JACIEL, 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;diabetic foot ulcer/gangrene  Primary Wound Type: Diabetic ulc  -JACIEL Wound Outcome: Amputation  -JACIEL       Wound 06/15/23 0102 Left anterior plantar    Wound - Properties Group Placement Date: 06/15/23  -SM Placement Time: 0102 -SM Side: Left  -SM Orientation: anterior  -SM Location: plantar  -SM    Retired Wound - Properties Group Placement Date: 06/15/23  -SM Placement Time: 0102  -SM Side: Left  -SM Orientation: anterior  -SM Location: plantar  -SM    Retired Wound - Properties Group Date first assessed: 06/15/23  -SM Time first assessed: 0102  -SM Side: Left  -SM Location: plantar  -SM       Wound 04/04/24 2100 Left posterior wrist Skin Tear    Wound - Properties Group Placement Date: 04/04/24  -NR Placement Time: 2100 -NR Present on Original Admission: N  -NR Side: Left  -NR Orientation: posterior  -NR Location: wrist  -NR Primary Wound Type: Skin tear  -NR Additional Comments: likely caused by wristband. Band removed, site cleaned with alcohol swab, wraped in gauze  -NR    Retired Wound - Properties Group Placement Date: 04/04/24  -NR Placement Time: 2100 -NR Present on Original  Admission: N  -NR Side: Left  -NR Orientation: posterior  -NR Location: wrist  -NR Primary Wound Type: Skin tear  -NR Additional Comments: likely caused by wristband. Band removed, site cleaned with alcohol swab, wraped in gauze  -NR    Retired Wound - Properties Group Date first assessed: 04/04/24  -NR Time first assessed: 2100  -NR Present on Original Admission: N  -NR Side: Left  -NR Location: wrist  -NR Primary Wound Type: Skin tear  -NR Additional Comments: likely caused by wristband. Band removed, site cleaned with alcohol swab, wraped in gauze  -NR       Plan of Care Review    Plan of Care Reviewed With patient  -EC        Progress no change  -EC        Outcome Evaluation OT eval complete.  Pt A&Ox4 c/o 8/10 pain, weakness, SOA, & nausea. He performs bed mob with SBA, indep with sitting balance EOB. He requires a surgical shoe on his L foot d/t a wound & requires maxA to don. Pain with B shoulder ROM past 60%, demos weakness throughout all extremities. Pt stood & amb short distance around the room with HHAx1. He is a high fall risk d/t decreased sensation in his BLEs. He demos significantly decreased act chase which is limiting his ability to carry out ADLs. Skilled OT indicated to address his deficits and maximize his indep & safety. Rec d/c to SNF for cont rehab vs home w HH  -EC           Positioning and Restraints    Pre-Treatment Position in bed  -EC        Post Treatment Position chair  -EC        In Chair reclined;call light within reach;encouraged to call for assist;legs elevated  -EC           Therapy Assessment/Plan (OT)    Date of Referral to OT 08/03/24  -EC        OT Diagnosis decreased ADLs  -EC        Rehab Potential (OT) good, to achieve stated therapy goals  -EC        Criteria for Skilled Therapeutic Interventions Met (OT) yes;meets criteria;skilled treatment is necessary  -EC        Therapy Frequency (OT) 5 times/wk  -EC        Predicted Duration of Therapy Intervention (OT) 10 days  -EC         Planned Therapy Interventions (OT) activity tolerance training;BADL retraining;functional balance retraining;occupation/activity based interventions;patient/caregiver education/training;strengthening exercise;transfer/mobility retraining  -EC           OT Goals    Transfer Goal Selection (OT) transfer, OT goal 1  -EC        Dressing Goal Selection (OT) dressing, OT goal 1  -EC        Strength Goal Selection (OT) strength, OT goal 1  -EC           Transfer Goal 1 (OT)    Activity/Assistive Device (Transfer Goal 1, OT) sit-to-stand/stand-to-sit;toilet;shower chair;tub  -EC        Riley Level/Cues Needed (Transfer Goal 1, OT) supervision required  -EC        Time Frame (Transfer Goal 1, OT) long term goal (LTG)  -EC        Progress/Outcome (Transfer Goal 1, OT) new goal  -EC           Dressing Goal 1 (OT)    Activity/Device (Dressing Goal 1, OT) dressing skills, all  -EC        Riley/Cues Needed (Dressing Goal 1, OT) minimum assist (75% or more patient effort)  -EC        Time Frame (Dressing Goal 1, OT) long term goal (LTG)  -EC        Strategies/Barriers (Dressing Goal 1, OT) AE PRN  -EC        Progress/Outcome (Dressing Goal 1, OT) new goal  -EC           Strength Goal 1 (OT)    Strength Goal 1 (OT) Pt will increase BUE strength to 4+/5 for increased safety & indep with ADLs & fxnal transfers  -EC        Time Frame (Strength Goal 1, OT) long term goal (LTG)  -EC        Progress/Outcome (Strength Goal 1, OT) new goal  -EC                  User Key  (r) = Recorded By, (t) = Taken By, (c) = Cosigned By      Initials Name Effective Dates    Yuliana Lisa RN 06/16/21 - 02/13/22     Haase, Mallory L, RN 06/16/21 - 07/14/22    AM Faviola Amaya RN 06/16/21 -     Michelle Diaz RN 09/22/22 -     NR Reuben Holbrook RN 02/14/23 -     EC Matilda Bryant OTR/L 10/13/23 -                      Occupational Therapy Education       Title: PT OT SLP Therapies (In Progress)       Topic:  Occupational Therapy (In Progress)       Point: ADL training (Done)       Description:   Instruct learner(s) on proper safety adaptation and remediation techniques during self care or transfers.   Instruct in proper use of assistive devices.                  Learning Progress Summary             Patient Acceptance, E, VU,NR by  at 8/4/2024 1426                         Point: Home exercise program (Not Started)       Description:   Instruct learner(s) on appropriate technique for monitoring, assisting and/or progressing therapeutic exercises/activities.                  Learner Progress:  Not documented in this visit.              Point: Precautions (Done)       Description:   Instruct learner(s) on prescribed precautions during self-care and functional transfers.                  Learning Progress Summary             Patient Acceptance, E, VU,NR by  at 8/4/2024 1426                         Point: Body mechanics (Done)       Description:   Instruct learner(s) on proper positioning and spine alignment during self-care, functional mobility activities and/or exercises.                  Learning Progress Summary             Patient Acceptance, E, VU,NR by  at 8/4/2024 1426                                         User Key       Initials Effective Dates Name Provider Type Discipline     10/13/23 -  Matilda Bryant, OTR/L Occupational Therapist OT                      OT Recommendation and Plan  Planned Therapy Interventions (OT): activity tolerance training, BADL retraining, functional balance retraining, occupation/activity based interventions, patient/caregiver education/training, strengthening exercise, transfer/mobility retraining  Therapy Frequency (OT): 5 times/wk  Plan of Care Review  Plan of Care Reviewed With: patient  Progress: no change  Outcome Evaluation: OT eval complete.  Pt A&Ox4 c/o 8/10 pain, weakness, SOA, & nausea. He performs bed mob with SBA, indep with sitting balance EOB. He requires a  surgical shoe on his L foot d/t a wound & requires maxA to don. Pain with B shoulder ROM past 60%, demos weakness throughout all extremities. Pt stood & amb short distance around the room with HHAx1. He is a high fall risk d/t decreased sensation in his BLEs. He demos significantly decreased act chase which is limiting his ability to carry out ADLs. Skilled OT indicated to address his deficits and maximize his indep & safety. Rec d/c to SNF for cont rehab vs home w   Plan of Care Reviewed With: patient  Outcome Evaluation: OT joel complete.  Pt A&Ox4 c/o 8/10 pain, weakness, SOA, & nausea. He performs bed mob with SBA, indep with sitting balance EOB. He requires a surgical shoe on his L foot d/t a wound & requires maxA to don. Pain with B shoulder ROM past 60%, demos weakness throughout all extremities. Pt stood & amb short distance around the room with HHAx1. He is a high fall risk d/t decreased sensation in his BLEs. He demos significantly decreased act chase which is limiting his ability to carry out ADLs. Skilled OT indicated to address his deficits and maximize his indep & safety. Rec d/c to SNF for cont rehab vs home w      Outcome Measures       Row Name 08/04/24 1500             How much help from another is currently needed...    Putting on and taking off regular lower body clothing? 1  -EC      Bathing (including washing, rinsing, and drying) 2  -EC      Toileting (which includes using toilet bed pan or urinal) 2  -EC      Putting on and taking off regular upper body clothing 3  -EC      Taking care of personal grooming (such as brushing teeth) 3  -EC      Eating meals 4  -EC      AM-PAC 6 Clicks Score (OT) 15  -EC         Functional Assessment    Outcome Measure Options AM-PAC 6 Clicks Daily Activity (OT)  -EC                User Key  (r) = Recorded By, (t) = Taken By, (c) = Cosigned By      Initials Name Provider Type    EC Matilda Bryant, OTR/L Occupational Therapist                    Time Calculation:               Matilda Bryant, OTR/L  8/4/2024    Electronically signed by Matilda Bryant, OTR/L at 08/04/24 3091

## 2024-08-08 NOTE — CASE MANAGEMENT/SOCIAL WORK
Continued Stay Note  [facility]     Patient Name: Erick Luong  MRN: [id number]  Today's Date: 8/8/2024    Admit Date: 7/31/2024    Plan: SNF   Discharge Plan       Row Name 08/08/24 1201       Plan    Plan SNF    Plan Comments [facility] has accepted patient. SW contacted admissions at [facility], [facility], and [facility]. These three referrals are still pending review. Patient prefers [facility] if possible. Will follow for decisions at pending facilities.             HAFSA Vaz

## 2024-08-08 NOTE — THERAPY TREATMENT NOTE
Acute Care - Physical Therapy Treatment Note  [facility]     Patient Name: Erick Luong  : 1955  MRN: [id number]  Today's Date: 2024      Visit Dx:     ICD-10-CM ICD-9-CM   1. Diabetic foot infection  E11.628 250.80    L08.9 686.9   2. Osteomyelitis of foot, unspecified laterality, unspecified type  M86.9 730.27   3. Diabetic ketoacidosis without coma associated with type 2 diabetes mellitus  E11.10 250.12   4. Elevated troponin  R79.89 790.6   5. Atrial fibrillation with RVR  I48.91 427.31   6. Confusion  R41.0 298.9   7. Impaired mobility [Z74.09]  Z74.09 799.89     Patient Active Problem List   Diagnosis    Obesity, unspecified obesity severity, unspecified obesity type    Essential hypertension    Type 2 diabetes mellitus with hyperglycemia, with long-term current use of insulin    Nonsmoker    Anemia due to chronic kidney disease    Class 3 severe obesity due to excess calories with body mass index (BMI) of 40.0 to 44.9 in adult    Anasarca    Sleep apnea with use of continuous positive airway pressure (CPAP)    Medically noncompliant    Diabetic ulcer of left foot associated with type 2 diabetes mellitus    Diabetic polyneuropathy associated with type 2 diabetes mellitus    Spinal stenosis in cervical region    Degeneration of cervical intervertebral disc    Cervical radiculopathy    Degeneration of lumbar or lumbosacral intervertebral disc    Cervical myelopathy    Bilateral carpal tunnel syndrome    CAD (coronary artery disease)    GERD without esophagitis    BPH without obstruction/lower urinary tract symptoms    Stage 3b chronic kidney disease    Chronic diastolic heart failure    Type 2 myocardial infarction due to heart failure    Left carpal tunnel syndrome    Syncope and collapse, recurrent episodes    Poorly-controlled hypertension    Rhinovirus    Peripheral vascular disease    Chronic kidney disease (CKD), stage IV (severe)    Diabetic foot infection    Sepsis    Epigastric pain     Chronic heart failure with preserved ejection fraction (HFpEF)    Sepsis due to methicillin resistant Staphylococcus aureus (MRSA) with encephalopathy without septic shock    Slow transit constipation    CHF exacerbation    CHF (congestive heart failure), NYHA class I, acute on chronic, combined    Rectal bleeding    Acute on chronic heart failure with preserved ejection fraction (HFpEF)    DKA, type 2, not at goal    Atrial fibrillation    E. coli UTI, ESBL     Past Medical History:   Diagnosis Date    Arthritis     Autonomic disease     CAD (coronary artery disease) 02/06/2017    Cervical radiculopathy 09/16/2021    Chronic constipation with acute exaccerbation 05/10/2021    Coronary artery disease     Degeneration of cervical intervertebral disc 08/11/2021    Diabetes mellitus     Diabetic foot ulcer 08/31/2020    Diabetic polyneuropathy associated with type 2 diabetes mellitus 01/18/2021    Elevated cholesterol     Gastroesophageal reflux disease 05/13/2019    Headache     HTN (hypertension), benign 05/03/2017    Hyperlipidemia     Hypertension     Mixed hyperlipidemia 02/07/2017    Multiple lung nodules 01/26/2020    5mm, 9 mm RLL identified 1/2020, not present 10/2019.    Myocardial infarction     Osteomyelitis 01/22/2020    Osteomyelitis of fifth toe of right foot 10/07/2019    Pancreatitis     Persistent insomnia 01/20/2020    Renal disorder     Sleep apnea 02/06/2017    Sleep apnea with use of continuous positive airway pressure (CPAP)     NON-COMPLIANT    Slow transit constipation 01/16/2019    Spinal stenosis in cervical region 09/16/2021    Vitamin D deficiency 03/02/2021     Past Surgical History:   Procedure Laterality Date    ABDOMINAL SURGERY      AMPUTATION FOOT / TOE Left 10/2021    5th digit     ANTERIOR CERVICAL DISCECTOMY W/ FUSION N/A 08/05/2022    Procedure: CERVICAL DISCECTOMY ANTERIOR WITH FUSION C5-6 with possible plating of C5-7 with neuromonitoring and 1 c-arm;  Surgeon: Karel Soliz,  MD;  Location: [facility] OR;  Service: Neurosurgery;  Laterality: N/A;    APPENDECTOMY      BACK SURGERY      CARDIAC CATHETERIZATION Left 02/08/2021    Procedure: Left Heart Cath w poss intervention left anatomical snuff box acess;  Surgeon: Omkar Charles DO;  Location: [facility] CATH INVASIVE LOCATION;  Service: Cardiology;  Laterality: Left;    CARDIAC SURGERY      CATARACT EXTRACTION      CERVICAL SPINE SURGERY      COLONOSCOPY N/A 01/31/2017    Normal exam repeat in 5 years    COLONOSCOPY N/A 02/11/2019    Mild acute inflammation    COLONOSCOPY N/A 04/07/2024    2 areas at 10 and 20 cm with friability ulceration 2 clips placed at 20 cm and 4 clips at 10 cm poor prep normal mucosa,mild eroisions and ulcerations in visible vessels    COLONOSCOPY N/A 7/1/2024    Procedure: COLONOSCOPY WITH ANESTHESIA;  Surgeon: Arsalan Lorenzo DO;  Location: [facility] ENDOSCOPY;  Service: Gastroenterology;  Laterality: N/A;  pre op constipation/diarrhea  post poor prep  pcp Del Shetty    COLONOSCOPY N/A 7/2/2024    Procedure: COLONOSCOPY WITH ANESTHESIA;  Surgeon: Agapito Christopher MD;  Location: [facility] ENDOSCOPY;  Service: Gastroenterology;  Laterality: N/A;  pre rectal bleeding  post poor prep  pcp Del Shetty MD    COLONOSCOPY W/ POLYPECTOMY  03/04/2014    Hyperplastic polyp    CORONARY ARTERY BYPASS GRAFT  10/2015    ENDOSCOPY  04/13/2011    Gastritis with hemorrhage    ENDOSCOPY N/A 05/05/2017    Normal exam    ENDOSCOPY N/A 02/11/2019    Gastritis    ENDOSCOPY N/A 09/01/2020    Non-erosive gastritis with hemorrhage    ENDOSCOPY N/A 02/10/2021    Esophagitis    ENDOSCOPY N/A 04/11/2024    There were esophageal mucosal changes suspicious for short-segment Low's esophagus present in the distal esophagus. The maximum longitudinal extent of these mucosal changes was 2 cm in length. Mucosa was biopsied with a cold forceps for histologyDistal esophagus, biopsies: Mild chronic active esophagogastritis. No  evidence of intestinal metaplasia, dysplasia. Antrum, bx, Mild chronic gastritis    FOOT SURGERY Left     INCISION AND DRAINAGE OF WOUND Left 09/2007    spider bite     PT Assessment (Last 12 Hours)       PT Evaluation and Treatment       Row Name 08/08/24 1059          Physical Therapy Time and Intention    Subjective Information complains of;fatigue  -LY     Document Type therapy note (daily note)  -LY     Mode of Treatment physical therapy  -LY       Row Name 08/08/24 1059          General Information    Existing Precautions/Restrictions fall;other (see comments)  Left foot wound, surgical shoe  -LY       Row Name 08/08/24 1059          Pain    Pretreatment Pain Rating 6/10  -LY     Posttreatment Pain Rating 6/10  -LY     Pain Location - Side/Orientation Left  -LY     Pain Location - foot  -LY     Pain Intervention(s) Medication (See MAR);Ambulation/increased activity  -LY       Row Name 08/08/24 1059          Bed Mobility    Supine-Sit [place] (Bed Mobility) supervision  -LY       Row Name 08/08/24 1059          Sit-Stand Transfer    Sit-Stand [place] (Transfers) verbal cues;contact guard  -LY       Row Name 08/08/24 1059          Stand-Sit Transfer    Stand-Sit [place] (Transfers) verbal cues;contact guard  -LY       Row Name 08/08/24 1059          Gait/Stairs (Locomotion)    [place] Level (Gait) verbal cues;contact guard  -LY     Assistive Device (Gait) walker, front-wheeled  -LY     Distance in Feet (Gait) 50  x4  -LY     Pattern (Gait) step-through  -LY     Deviations/Abnormal Patterns (Gait) gait speed decreased;stride length decreased  -LY     Bilateral Gait Deviations forward flexed posture;heel strike decreased  -LY       Row Name             Wound 08/22/23 0140 Left posterior plantar    Wound - Properties Group Placement Date: 08/22/23  -AM Placement Time: 0140  -AM Present on Original Admission: Y  -AM Side: Left  -AM Orientation: posterior  -AM Location: plantar  -AM    Retired  Wound - Properties Group Placement Date: 08/22/23  -AM Placement Time: 0140  -AM Present on Original Admission: Y  -AM Side: Left  -AM Orientation: posterior  -AM Location: plantar  -AM    Retired Wound - Properties Group Date first assessed: 08/22/23  -AM Time first assessed: 0140  -AM Present on Original Admission: Y  -AM Side: Left  -AM Location: plantar  -AM      Row Name             Wound Left lateral foot Diabetic Ulcer    Wound - Properties Group Side: Left  -MH Orientation: lateral  -MH Location: foot  -JACIEL, 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;diabetic foot ulcer/gangrene  Primary Wound Type: Diabetic ulc  -JACIEL Wound Outcome: Amputation  -JACIEL Stage, Pressure Injury : other (see comments)  -JACIEL, full thickness starting 10/20/21     Retired Wound - Properties Group Side: Left  -MH Orientation: lateral  -MH Location: foot  -JACIEL, 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;diabetic foot ulcer/gangrene  Primary Wound Type: Diabetic ulc  -JACIEL Stage, Pressure Injury : other (see comments)  -JACIEL, full thickness starting 10/20/21  Wound Outcome: Amputation  -JACIEL    Retired Wound - Properties Group Side: Left  -MH Location: foot  -JACIEL, 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;diabetic foot ulcer/gangrene  Primary Wound Type: Diabetic ulc  -JACIEL Wound Outcome: Amputation  -JACIEL      Row Name             Wound 06/15/23 0102 Left anterior plantar    Wound - Properties Group Placement Date: 06/15/23  -SM Placement Time: 0102  -SM Side: Left  -SM Orientation: anterior  -SM Location: plantar  -SM    Retired Wound - Properties Group Placement Date: 06/15/23  -SM Placement Time: 0102  -SM Side: Left  -SM Orientation: anterior  -SM Location: plantar  -SM    Retired Wound - Properties Group Date first assessed: 06/15/23  -SM Time first assessed: 0102  -SM Side: Left  -SM Location: plantar  -SM      Row Name 08/08/24 1059          Plan of Care Review    Plan of Care Reviewed With patient  -LY     Progress improving  -LY       Row Name 08/08/24 1059          Positioning and Restraints     Pre-Treatment Position in bed  -LY     Post Treatment Position bed  -LY     In Bed sitting EOB;call light within reach;encouraged to call for assist  -LY               User Key  (r) = Recorded By, (t) = Taken By, (c) = Cosigned By      Initials Name Provider Type    Yuliana Lisa, RN Registered Nurse    MH Haase, Mallory L, RN Registered Nurse    Alesha Armenta, PTA Physical Therapist Assistant    Faviola Kunz RN Registered Nurse    Michelle Diaz RN Registered Nurse                    Physical Therapy Education       Title: PT OT SLP Therapies (In Progress)       Topic: Physical Therapy (Done)       Point: Mobility training (Done)       Learning Progress Summary             Patient Acceptance, E,D, DU,VU by  at 8/4/2024 4547    Comment: benefits of PT and POC, call for A OOB                         Point: Precautions (Done)       Learning Progress Summary             Patient Acceptance, E,D, DU,VU by  at 8/4/2024 1457    Comment: benefits of PT and POC, call for A OOB                                         User Key       Initials Effective Dates Name Provider Type Discipline     02/03/23 -  Daniel Garcia, PT Physical Therapist PT                  PT Recommendation and Plan  Anticipated Discharge Disposition (PT): skilled nursing facility  Plan of Care Reviewed With: patient  Progress: improving  Outcome Evaluation: PT tx completed. Pt in bed and agrees to therapy. S for bed mobility. Required assist for donning of L surgical shoe. No c/o pain. Pt stands with CGA. Amb 50' at a time with RWX and CGA. As pt fatigeus he requires more cues for safe gait mechanics. Noted L decreased foot clearance at times. Will cont to follow.       Time Calculation:    PT Charges       Row Name 08/08/24 1408             Time Calculation    Start Time 1059  -LY      Stop Time 1112  -LY      Time Calculation (min) 13 min  -LY      PT Received On 08/08/24  -LY         Time Calculation- PT    Total Timed  Code Minutes- PT 13 minute(s)  -LY         Timed Charges    37597 - Gait Training Minutes  13  -LY         Total Minutes    Timed Charges Total Minutes 13  -LY       Total Minutes 13  -LY                User Key  (r) = Recorded By, (t) = Taken By, (c) = Cosigned By      Initials Name Provider Type    Alesha Armenta PTA Physical Therapist Assistant                  Therapy Charges for Today       Code Description Service Date Service Provider Modifiers Qty    52086204807 HC GAIT TRAINING EA 15 MIN 8/7/2024 Alesha Dominguez PTA GP 1    88692119074 HC GAIT TRAINING EA 15 MIN 8/8/2024 Alesha Dominguez PTA GP 1            PT G-Codes  Outcome Measure Options: AM-PAC 6 Clicks Daily Activity (OT)  AM-PAC 6 Clicks Score (PT): 18  AM-PAC 6 Clicks Score (OT): 15    Alesha Dominguez PTA  8/8/2024

## 2024-08-08 NOTE — PROGRESS NOTES
[facility] - PODIATRY    Today's Date: 08/08/24     Patient Name: Erick Luong  MRN: [id number]  CSN: [id number]  PCP: Del Shetty MD  Referring Provider: Abebe Garzon DO  Attending Provider: Alejandrina Barnett DO  Length of Stay: 8    SUBJECTIVE   Chief Complaint: Left foot wounds    HPI: Erick Luong, a 69 y.o.male, who is being followed by podiatry for left foot wounds.  Patient reports nursing has been changing his dressing regularly.  Relays that his pain is 8/10 currently to his left foot. States that he will likely be discharged in the next few days to a skilled nursing facility. Denies significant overnight events.    Past Medical History:   Diagnosis Date    Arthritis     Autonomic disease     CAD (coronary artery disease) 02/06/2017    Cervical radiculopathy 09/16/2021    Chronic constipation with acute exaccerbation 05/10/2021    Coronary artery disease     Degeneration of cervical intervertebral disc 08/11/2021    Diabetes mellitus     Diabetic foot ulcer 08/31/2020    Diabetic polyneuropathy associated with type 2 diabetes mellitus 01/18/2021    Elevated cholesterol     Gastroesophageal reflux disease 05/13/2019    Headache     HTN (hypertension), benign 05/03/2017    Hyperlipidemia     Hypertension     Mixed hyperlipidemia 02/07/2017    Multiple lung nodules 01/26/2020    5mm, 9 mm RLL identified 1/2020, not present 10/2019.    Myocardial infarction     Osteomyelitis 01/22/2020    Osteomyelitis of fifth toe of right foot 10/07/2019    Pancreatitis     Persistent insomnia 01/20/2020    Renal disorder     Sleep apnea 02/06/2017    Sleep apnea with use of continuous positive airway pressure (CPAP)     NON-COMPLIANT    Slow transit constipation 01/16/2019    Spinal stenosis in cervical region 09/16/2021    Vitamin D deficiency 03/02/2021     Past Surgical History:   Procedure Laterality Date    ABDOMINAL SURGERY      AMPUTATION FOOT / TOE Left 10/2021    5th digit      ANTERIOR CERVICAL DISCECTOMY W/ FUSION N/A 08/05/2022    Procedure: CERVICAL DISCECTOMY ANTERIOR WITH FUSION C5-6 with possible plating of C5-7 with neuromonitoring and 1 c-arm;  Surgeon: Karel Soliz MD;  Location: [facility] OR;  Service: Neurosurgery;  Laterality: N/A;    APPENDECTOMY      BACK SURGERY      CARDIAC CATHETERIZATION Left 02/08/2021    Procedure: Left Heart Cath w poss intervention left anatomical snuff box acess;  Surgeon: Omkar Charles DO;  Location: [facility] CATH INVASIVE LOCATION;  Service: Cardiology;  Laterality: Left;    CARDIAC SURGERY      CATARACT EXTRACTION      CERVICAL SPINE SURGERY      COLONOSCOPY N/A 01/31/2017    Normal exam repeat in 5 years    COLONOSCOPY N/A 02/11/2019    Mild acute inflammation    COLONOSCOPY N/A 04/07/2024    2 areas at 10 and 20 cm with friability ulceration 2 clips placed at 20 cm and 4 clips at 10 cm poor prep normal mucosa,mild eroisions and ulcerations in visible vessels    COLONOSCOPY N/A 7/1/2024    Procedure: COLONOSCOPY WITH ANESTHESIA;  Surgeon: Arsalan Lorenzo DO;  Location: [facility] ENDOSCOPY;  Service: Gastroenterology;  Laterality: N/A;  pre op constipation/diarrhea  post poor prep  pcp Del Shetty    COLONOSCOPY N/A 7/2/2024    Procedure: COLONOSCOPY WITH ANESTHESIA;  Surgeon: Agapito Christopher MD;  Location: [facility] ENDOSCOPY;  Service: Gastroenterology;  Laterality: N/A;  pre rectal bleeding  post poor prep  pcp Del Shetty MD    COLONOSCOPY W/ POLYPECTOMY  03/04/2014    Hyperplastic polyp    CORONARY ARTERY BYPASS GRAFT  10/2015    ENDOSCOPY  04/13/2011    Gastritis with hemorrhage    ENDOSCOPY N/A 05/05/2017    Normal exam    ENDOSCOPY N/A 02/11/2019    Gastritis    ENDOSCOPY N/A 09/01/2020    Non-erosive gastritis with hemorrhage    ENDOSCOPY N/A 02/10/2021    Esophagitis    ENDOSCOPY N/A 04/11/2024    There were esophageal mucosal changes suspicious for short-segment Low's esophagus present in the distal esophagus.  "The maximum longitudinal extent of these mucosal changes was 2 cm in length. Mucosa was biopsied with a cold forceps for histologyDistal esophagus, biopsies: Mild chronic active esophagogastritis. No evidence of intestinal metaplasia, dysplasia. Antrum, bx, Mild chronic gastritis    FOOT SURGERY Left     INCISION AND DRAINAGE OF WOUND Left 2007    spider bite     Family History   Problem Relation Age of Onset    Colon cancer Father     Heart disease Father     Colon cancer Sister     Colon polyps Sister     Alzheimer's disease Mother     Coronary artery disease Sister     Coronary artery disease Sister      Social History     Socioeconomic History    Marital status:    Tobacco Use    Smoking status: Former     Current packs/day: 0.00     Types: Cigarettes     Quit date:      Years since quittin.6    Smokeless tobacco: Never    Tobacco comments:     smoked in [employer]   Vaping Use    Vaping status: Never Used   Substance and Sexual Activity    Alcohol use: No    Drug use: No    Sexual activity: Defer     Allergies   Allergen Reactions    Cefepime Hives and Anaphylaxis    Bactrim [Sulfamethoxazole-Trimethoprim] Other (See Comments)     \"RENAL FAILURE\"    Vancomycin Itching    Zolpidem Mental Status Change     \"makes him crazy\"    Metronidazole Rash     Current Facility-Administered Medications   Medication Dose Route Frequency Provider Last Rate Last Admin    ascorbic acid (VITAMIN C) tablet 1,000 mg  1,000 mg Oral Daily Reuben Garza APRN   1,000 mg at 24 0823    sennosides-docusate (PERICOLACE) 8.6-50 MG per tablet 2 tablet  2 tablet Oral BID Lamine Figueroa APRN   2 tablet at 249    And    polyethylene glycol (MIRALAX) packet 17 g  17 g Oral Daily PRN Lamine Figueroa APRN        And    bisacodyl (DULCOLAX) EC tablet 5 mg  5 mg Oral Daily PRN Lamine Figueroa APRN        And    bisacodyl (DULCOLAX) suppository 10 mg  10 mg Rectal Daily PRN Lamine Figueroa APRN   10 mg at " 08/02/24 0941    Calcium Replacement - Follow Nurse / BPA Driven Protocol   Does not apply PRN Abebe Garzon DO        dextrose (D50W) (25 g/50 mL) IV injection 10-50 mL  10-50 mL Intravenous Q15 Min PRN Abebe Garzon DO        dextrose (D50W) (25 g/50 mL) IV injection 25 g  25 g Intravenous Q15 Min PRN Lamine Figueroa APRN        dextrose (GLUTOSE) oral gel 15 g  15 g Oral Q15 Min PRN Lamine Figueroa APRN        donepezil (ARICEPT) tablet 10 mg  10 mg Oral Daily Reuben Garza APRN   10 mg at 08/08/24 0824    DULoxetine (CYMBALTA) DR capsule 60 mg  60 mg Oral Daily Reuben Garza APRN   60 mg at 08/08/24 0824    Enoxaparin Sodium (LOVENOX) syringe 135 mg  1 mg/kg Subcutaneous Q12H Abebe Garzon DO   135 mg at 08/08/24 0831    famotidine (PEPCID) tablet 20 mg  20 mg Oral Q12H Reuben Garza APRN   20 mg at 08/08/24 0824    glucagon (GLUCAGEN) injection 1 mg  1 mg Intramuscular Q15 Min PRN Lamine Figueroa APRN        insulin glargine (LANTUS, SEMGLEE) injection 10 Units  10 Units Subcutaneous Daily Keren Jamison MD   10 Units at 08/08/24 0823    insulin regular (humuLIN R,novoLIN R) injection 3-14 Units  3-14 Units Subcutaneous 4x Daily AC & at Bedtime Keren Jamison MD   3 Units at 08/03/24 2033    Magnesium Standard Dose Replacement - Follow Nurse / BPA Driven Protocol   Does not apply PRN Abebe Garzon DO        melatonin tablet 2.5 mg  2.5 mg Oral Nightly Reuben Garza APRN   2.5 mg at 08/07/24 2230    nitroglycerin (NITROSTAT) SL tablet 0.4 mg  0.4 mg Sublingual Q5 Min PRN Lamine Figueroa APRN        ondansetron (ZOFRAN) injection 4 mg  4 mg Intravenous Q6H PRN Payam Keyes DO   4 mg at 08/08/24 0900    oxyCODONE (ROXICODONE) immediate release tablet 10 mg  10 mg Oral BID Lamine Figueroa APRN   10 mg at 08/08/24 0824    pantoprazole (PROTONIX) EC tablet 40 mg  40 mg Oral Q12H Reuben Garza APRN   40 mg at 08/08/24 0824    Pharmacy to Dose enoxaparin  (LOVENOX)   Does not apply Continuous PRN Abebe Garzon DO        Phosphorus Replacement - Follow Nurse / BPA Driven Protocol   Does not apply PRN Abebe Garzon DO        polyethylene glycol (MIRALAX) packet 17 g  17 g Oral Daily Reuben Garza APRN   17 g at 08/08/24 0823    Potassium Replacement - Follow Nurse / BPA Driven Protocol   Does not apply PRN Abebe Garzon DO        pregabalin (LYRICA) capsule 100 mg  100 mg Oral Nightly Reuben Garza APRN   100 mg at 08/07/24 2229    pregabalin (LYRICA) capsule 50 mg  50 mg Oral Daily Reuben Garza APRN   50 mg at 08/08/24 0823    rosuvastatin (CRESTOR) tablet 10 mg  10 mg Oral Nightly Reuben Garza APRN   10 mg at 08/07/24 2231    sodium bicarbonate tablet 650 mg  650 mg Oral TID Brian Lomas MD   650 mg at 08/08/24 0824    sodium chloride 0.9 % flush 10 mL  10 mL Intravenous Q12H Abebe Garzon DO   10 mL at 08/08/24 0825    sodium chloride 0.9 % flush 10 mL  10 mL Intravenous PRN Abebe Garzon DO        sodium chloride 0.9 % flush 10 mL  10 mL Intravenous Q12H Lamine Figueroa APRN   10 mL at 08/08/24 0823    sodium chloride 0.9 % flush 10 mL  10 mL Intravenous PRN Lamine Figueroa APRN        sodium chloride 0.9 % infusion 40 mL  40 mL Intravenous PRN Abebe Garzon DO        sodium chloride 0.9 % infusion 40 mL  40 mL Intravenous PRN Lamine Figueroa APRN        sodium hypochlorite (DAKIN'S 1/4 STRENGTH) 0.125 % topical solution 0.125% solution   Topical Q12H Aby High APRN   Given at 08/06/24 1000     Review of Systems   Constitutional:  Negative for activity change.   HENT:  Negative for congestion.    Respiratory:  Negative for apnea and shortness of breath.    Gastrointestinal:  Negative for abdominal pain.   Musculoskeletal:  Negative for arthralgias and back pain.        Foot pain   Skin:  Positive for wound.   Neurological:  Positive for numbness.   Psychiatric/Behavioral:  Positive for decreased  concentration. Negative for agitation, behavioral problems and confusion.        OBJECTIVE     Vitals:    08/08/24 1547   BP: 150/76   Pulse: 80   Resp: 16   Temp: 97.3 °F (36.3 °C)   SpO2: 95%       PHYSICAL EXAM  GEN:   Pt presents in hospital bed. Accompanied by none.     Foot/Ankle Exam    GENERAL  Appearance:  chronically ill  Affect:  unfocused  Right shoe gear: sock  Left shoe gear: sock    VASCULAR     Right Foot Vascularity   Dorsalis pedis:  2+  Posterior tibial:  2+  Skin temperature:  warm  Edema grading:  Trace  CFT:  3  Pedal hair growth:  Absent     Left Foot Vascularity   Dorsalis pedis:  2+  Posterior tibial:  2+  Skin temperature:  warm  Edema grading:  Trace  CFT:  3  Pedal hair growth:  Absent     NEUROLOGIC     Right Foot Neurologic   Light touch sensation: diminished  Vibratory sensation: diminished  Hot/Cold sensation: diminished     Left Foot Neurologic   Light touch sensation: diminished  Vibratory sensation: diminished  Hot/Cold sensation:  diminished    MUSCULOSKELETAL     Right Foot Musculoskeletal   Ecchymosis:  none  Tenderness:  none       Left Foot Musculoskeletal   Ecchymosis:  none  Tenderness:  lateral foot tenderness    MUSCLE STRENGTH     Right Foot Muscle Strength   Foot dorsiflexion:  5  Foot plantar flexion:  5  Foot inversion:  5  Foot eversion:  5     Left Foot Muscle Strength   Foot dorsiflexion:  4+  Foot plantar flexion:  4+  Foot inversion:  4+  Foot eversion:  4+    DERMATOLOGIC      Right Foot Dermatologic   Skin  Right foot skin is intact.      Left Foot Dermatologic   Skin  Positive for drainage, erythema and ulcer.      Left foot additional comments: Multiple areas of ulceration with serosanguineous drainage, erythema, and slight tenderness present. No malodor noticed.    See attached photos.               RADIOLOGY/NUCLEAR:  US Renal Bilateral    Result Date: 8/1/2024  Narrative: US RENAL BILATERAL- 8/1/2024 11:19 AM  HISTORY: Acute kidney injury; E11.628-Type 2  diabetes mellitus with other skin complications; L08.9-Local infection of the skin and subcutaneous tissue, unspecified; M86.9-Osteomyelitis, unspecified; E11.10-Type 2 diabetes mellitus with ketoacidosis without coma; R79.89-Other specified abnormal findings of blood chemistry; I48.91-Unspecified atrial fibrillation; R41.0-Disorientation, unspecified  COMPARISON: None available.   TECHNIQUE: Multiple longitudinal and transverse real-time sonographic images of the kidneys and urinary bladder are obtained. Report and images stored per institutional and state regulations.    FINDINGS:  Visualized proximal abdominal aorta and IVC are unremarkable.   RIGHT KIDNEY: Right kidney is 10.7 cm in length. Renal cortex is unremarkable. There is no hydronephrosis. There is an exophytic anechoic simple right renal cyst measuring 4.9 cm..  LEFT KIDNEY: Left kidney is 11.3 cm in length. Renal cortex is unremarkable. There is no left hydronephrosis.  PELVIS: Urinary bladder is unremarkable.       Impression:  1. Simple right renal cyst. 2. Otherwise unremarkable kidneys and no hydronephrosis.    This report was signed and finalized on 8/1/2024 1:02 PM by Eran Christina.      CT Abdomen Pelvis With Contrast    Result Date: 8/1/2024  Narrative: EXAMINATION: CT ABDOMEN PELVIS W CONTRAST-   7/31/2024 10:41 PM  HISTORY: abdominal distention with pain; M86.9-Osteomyelitis, unspecified; E11.10-Type 2 diabetes mellitus with ketoacidosis without coma; R79.89-Other specified abnormal findings of blood chemistry; I48.91-Unspecified atrial fibrillation; R41.0-Disorientation, unspecified  In order to have a CT radiation dose as low as reasonably achievable Automated Exposure Control was utilized for adjustment of the mA and/or KV according to patient size.  Total DLP = 1841.72 mGy.cm  The CT scan of the abdomen and pelvis is performed after intravenous contrast enhancement.  The images are acquired in axial plane and subsequent reconstruction  in coronal and sagittal planes.  Comparison is made with the previous study dated 6/27/2024.  The lung bases included in the study show a trace right and small left basal pleural effusion. There are mild atelectatic changes at bilateral bases left more than the right.  Limited visualized cardiomediastinal structures show atheromatous changes of the coronary arteries. There is moderate cardiomegaly.  The liver and spleen are normal.  The gallbladder is surgically absent.  Fatty infiltrated pancreas seen. No focal abnormality. No ductal dilatation. The adrenal glands are normal.  There is persistent bilateral significant perinephric fat infiltration and thickening of the pararenal fascia which is similar to the previous study. Bilateral nephrogram is normal and symmetrical. No calculi. No hydronephrosis. There is a well-defined sharply marginated low density exophytic mass from the lower pole of the right kidney measuring 4.4 cm in diameter. CT density suggest a cyst. Limited visualized ureters are normal and nondilated. The urinary bladder is well distended. No intrinsic abnormality.  Prostate is not significantly enlarged.  There are small fat-containing inguinal hernias, right larger than the left.  There is subcutaneous fat infiltration of the entire abdominal wall extending into the lower extremity. This may represent a fluid overload?.  The stomach is decompressed with moderate wall thickening. No focal abnormality Alamast. Duodenum is normal. Small bowel is nondistended and nondilated. Appendix is surgically absent. There is significant large volume stool throughout the colon. No finding to suggest obstruction.  Atheromatous changes of the abdominal aorta and iliac arteries. No aneurysmal dilatation.  Moderately prominent nonspecific retroperitoneal para-aortic lymph nodes predominantly in the mid abdomen. A referenced left para iliac lymph node, image #57 in series 2 and image #40 and series 3, measures 1.6 cm  in short axis. There is a large lymph node in the left lower anterior pelvis/external iliac group measuring 1.3 cm in short axis. There are moderately prominent inguinal lymph nodes. A left inguinal lymph node measures 2 cm in short axis.  Images reviewed in bone window show chronic degenerative changes of the lumbar spine. No acute bony abnormality.      Impression: 1. A significant perinephric fat stranding is similar to the previous study and is a nonspecific finding. Possibility for chronic inflammatory process/chronic pyelonephritis may not be excluded. Renal functions are normal and symmetrical. 2. Fatty infiltration of the pancreas. No mass. No ductal dilatation. 3. Nonspecific abdominal, pelvic and inguinal lymphadenopathy. The etiology and clinical significance is not certain. This appears moderately more progressive since the previous study. 4. Diffuse subcutaneous fat infiltration of the abdominal wall extending into the lower extremity may represent fluid overload?. 5. A significant large volume of stool in the colon without evidence of obstruction. This may represent constipation.  The above study was initially reviewed and reported by [employer]. I do not find any discrepancies.           This report was signed and finalized on 8/1/2024 7:50 AM by Dr. Carlos Cutler MD.      CT Head Without Contrast    Result Date: 8/1/2024  Narrative: EXAMINATION: CT HEAD WO CONTRAST-   7/31/2024 10:41 PM  HISTORY: altered mental status; M86.9-Osteomyelitis, unspecified; E11.10-Type 2 diabetes mellitus with ketoacidosis without coma; R79.89-Other specified abnormal findings of blood chemistry; I48.91-Unspecified atrial fibrillation; R41.0-Disorientation, unspecified  In order to have a CT radiation dose as low as reasonably achievable Automated Exposure Control was utilized for adjustment of the mA and/or KV according to patient size.  Total DLP = 783.72 mGy.cm  The CT scan of the head is performed without intravenous  contrast enhancement.  The images are acquired in axial plane and subsequent reconstruction with coronal and sagittal planes.  Comparison is made with the previous study dated 5/3/2024.  There is no evidence of a mass. There is no midline shift.  There is no evidence of intracranial hemorrhage or hematoma.  Moderately dilated ventricles, basal cisterns and the cortical sulci are similar to the previous study representing chronic volume loss.  Areas of chronic white matter ischemia bilaterally are noted. The gray-white matter differentiation is maintained.  Posterior fossa structures are normal.  The images reviewed in bone window show no acute displaced skull fracture. A subtle nondisplaced fracture or lesion may be obscured due to motion artifacts. Large mucous retention cyst is seen in the right maxillary antrum. The remaining paranasal sinuses and mastoid air cells are clear.      Impression: 1. No acute intracranial abnormality. 2. Chronic ischemic and atrophic changes. 3. Chronic maxillary sinusitis.  The above study was initially reviewed and reported by [employer]. I do not find any discrepancies.             This report was signed and finalized on 8/1/2024 5:27 AM by Dr. Carlos Cutler MD.      XR Foot 3+ View Left    Result Date: 7/31/2024  Narrative: EXAMINATION:  XR FOOT 3+ VW LEFT-  7/31/2024 6:22 PM  HISTORY: Heel wound.  COMPARISON: 3/26/2024.  TECHNIQUE: 3 views were obtained.  FINDINGS: There is a deep ulcer on the sole of the foot posteriorly. The ulcer appears to be packed with some type of radiopaque material. On the lateral image, there may be a small area of bony erosion involving the calcaneus just posterior to the plantar calcaneal spur. A small area of osteomyelitis is not ruled out. There has been prior amputation of the fifth toe and distal fifth metatarsal. There may have been prior resection of the distal fourth metacarpal and a portion of the proximal phalanx of the fourth toe versus  chronic erosive change. The appearance is stable. There is severe narrowing of the first MTP joint. There is narrowing of some of the interphalangeal joints. There is some spurring in the tarsal region and at the tarsal-metatarsal junction. There is soft tissue swelling of the foot diffusely. There is a small curvilinear foreign body in the soft tissues along the sole of the foot in the second toe region in the proximal phalanx area. There is another small linear foreign body in the soft tissues adjacent to the distal phalanx of the great toe.       Impression: 1. Deep ulcer on the sole of the foot posteriorly near the calcaneus. There is a questionable small area of bony erosion along the plantar surface of the calcaneus just proximal to the plantar calcaneal spur. Osteomyelitis cannot be ruled out. The soft tissue ulcer is packed with some type of radiopaque material. 2. Linear foreign bodies projected over the soft tissues of the second toe and first toe. Artifacts are also included in the differential. 3. Other chronic changes, as discussed.   This report was signed and finalized on 7/31/2024 7:47 PM by Dr. Raz Mendes MD.      XR Chest 1 View    Result Date: 7/31/2024  Narrative: EXAMINATION:  XR CHEST 1 VW-  7/31/2024 6:22 PM  HISTORY: Altered mental status. Hypertension and diabetes.  COMPARISON: 6/28/2024.  TECHNIQUE: Single view AP image.  FINDINGS: There is hypoventilation with vascular crowding. There is mild bronchial wall thickening, stable. There is no dense infiltrate or effusion. Heart size is borderline. Prior heart bypass surgery. Prior cervical fusion. No definite acute bony abnormality.       Impression: 1. Hypoventilation with vascular crowding. 2. Mild bronchial wall thickening, stable.    This report was signed and finalized on 7/31/2024 7:41 PM by Dr. Raz Mendes MD.       LABORATORY/CULTURE RESULTS:  Results from last 7 days   Lab Units 08/08/24  0449 08/05/24  0444 08/04/24  0615    WBC 10*3/mm3 8.08 5.89 5.73   HEMOGLOBIN g/dL 9.6* 9.5* 9.5*   HEMATOCRIT % 30.5* 30.1* 30.5*   PLATELETS 10*3/mm3 242 210 201     Results from last 7 days   Lab Units 08/08/24  0449 08/07/24  0758 08/06/24  0508 08/05/24  0444 08/04/24  0615 08/03/24  0605   SODIUM mmol/L 141 144 143 144 145 143   POTASSIUM mmol/L 3.8 3.9 4.1 3.9 3.9 3.8   CHLORIDE mmol/L 109* 110* 109* 112* 111* 110*   CO2 mmol/L 21.0* 20.0* 22.0 21.0* 21.0* 19.0*   BUN mg/dL 15 15 18 20 24* 31*   CREATININE mg/dL 1.42* 1.50* 1.47* 1.47* 1.60* 1.82*   CALCIUM mg/dL 8.5* 8.6 8.4* 8.3* 8.5* 8.3*   BILIRUBIN mg/dL  --   --   --  0.3 0.3 0.3   ALK PHOS U/L  --   --   --  102 95 88   ALT (SGPT) U/L  --   --   --  9 9 7   AST (SGOT) U/L  --   --   --  16 15 12   GLUCOSE mg/dL 110* 97 101* 89 97 125*         Microbiology Results (last 10 days)       Procedure Component Value - Date/Time    Wound Culture - Drainage, Foot, Left [[id number]]  (Abnormal) Collected: 08/02/24 1338    Lab Status: Final result Specimen: Drainage from Foot, Left Updated: 08/04/24 0857     Wound Culture Light growth (2+) Streptococcus agalactiae (Group B)     Comment:   This organism is considered to be universally susceptible to penicillin.  No further antibiotic testing will be performed. If Clindamycin or Erythromycin is the drug of choice, notify the laboratory within 7 days to request susceptibility testing.        Gram Stain Rare (1+) WBCs seen      No organisms seen    Wound Culture - Wound, Foot, Left [[id number]]  (Abnormal) Collected: 08/02/24 1032    Lab Status: Final result Specimen: Wound from Foot, Left Updated: 08/04/24 0857     Wound Culture Light growth (2+) Streptococcus agalactiae (Group B)     Comment:   This organism is considered to be universally susceptible to penicillin.  No further antibiotic testing will be performed. If Clindamycin or Erythromycin is the drug of choice, notify the laboratory within 7 days to request susceptibility testing.        Gram  Stain Few (2+) WBCs seen      Rare (1+) Gram positive cocci    Eosinophil Smear - Urine, Urine, Clean Catch [[id number]]  (Normal) Collected: 08/01/24 1547    Lab Status: Final result Specimen: Urine, Clean Catch Updated: 08/02/24 0149     Eosinophil Smear 0 % EOS/100 Cells     MRSA Screen, PCR (Inpatient) - Swab, Nares [[id number]]  (Normal) Collected: 08/01/24 0206    Lab Status: Final result Specimen: Swab from Nares Updated: 08/01/24 0346     MRSA PCR No MRSA Detected    Narrative:      The negative predictive value of this diagnostic test is high and should only be used to consider de-escalating anti-MRSA therapy. A positive result may indicate colonization with MRSA and must be correlated clinically.    Urine Culture - Urine, Straight Cath [[id number]]  (Abnormal)  (Susceptibility) Collected: 07/31/24 1851    Lab Status: Final result Specimen: Urine from Straight Cath Updated: 08/04/24 1129     Urine Culture >100,000 CFU/mL Escherichia coli ESBL    Narrative:      Colonization of the urinary tract without infection is common. Treatment is discouraged unless the patient is symptomatic, pregnant, or undergoing an invasive urologic procedure.  Recent outcomes data supports the use of pip/tazo in the treatment of susceptible ESBL infections for uncomplicated UTI. Consider use of pip/tazo as a carbapenem-sparing regimen in applicable patients.    Susceptibility        Escherichia coli ESBL      MATT      Amikacin Susceptible      Ertapenem Susceptible      Gentamicin Resistant      Levofloxacin Susceptible      Meropenem Susceptible      Nitrofurantoin Susceptible      Piperacillin + Tazobactam Susceptible      Tobramycin Resistant      Trimethoprim + Sulfamethoxazole Resistant                           Blood Culture - Blood, Hand, Left [[id number]]  (Normal) Collected: 07/31/24 1849    Lab Status: Final result Specimen: Blood from Hand, Left Updated: 08/05/24 1915     Blood Culture No growth at 5 days    Blood Culture  - Blood, Arm, Left [[id number]]  (Abnormal) Collected: 07/31/24 1849    Lab Status: Final result Specimen: Blood from Arm, Left Updated: 08/02/24 0545     Blood Culture Staphylococcus, coagulase negative     Isolated from Aerobic Bottle     Gram Stain Aerobic Bottle Gram positive cocci in clusters    Narrative:      Probable contaminant requires clinical correlation, susceptibility not performed unless requested by physician.      Blood Culture ID, PCR - Blood, Arm, Left [[id number]]  (Abnormal) Collected: 07/31/24 1849    Lab Status: Final result Specimen: Blood from Arm, Left Updated: 08/01/24 1117     BCID, PCR Staph spp, not aureus or lugdunensis. Identification by BCID2 PCR.     BOTTLE TYPE Aerobic Bottle            PATHOLOGY RESULTS:       ASSESSMENT/PLAN   Diabetic foot ulcerations to the left foot  Type 2 diabetes mellitus with hyperglycemia  Diabetic polyneuropathy    Review of labs, imaging, and other provider documentation  Comprehensive lower extremity examination and evaluation was performed.    Linear foreign bodies projected over the soft tissues of the second  toe and first toe were noted upon xray imaging (artifacts were also included in the differential). No evidence of soft tissue damage, FB entry, or infection noted to either great or second toes today.      Continue wound care orders- Betadine wet-to-dry dressing to be performed twice daily per nursing.     No surgical interventions are planned at this time from a podiatric standpoint.     Due to difficulty caring for foot wounds at home, our recommendation would be for the patient to go to a skilled nursing facility for ongoing wound care upon discharge.       Thank you kindly for the consult, will continue to follow patient while in house.      This document has been electronically signed by SUSAN Luna on August 8, 2024 16:46 CDT

## 2024-08-08 NOTE — PLAN OF CARE
"  Problem: Adult Inpatient Plan of Care  Goal: Plan of Care Review  Recent Flowsheet Documentation  Taken 8/8/2024 1318 by Thien Conner, OTR/L  Progress: no change  Plan of Care Reviewed With: patient  Outcome Evaluation: OT tx completed. Pt was asleep but easy to awaken, but remains drowsy. Pt agreeable to bed level activity as he reports he just finished working with PT. Pt reports pain \"7-8\"/10 in L foot. Pt completes BUE AROM HEP in all planes. 20 reps x1 set. 1lb dowel bar utilized as able. Pt reports increased difficulty with left shoulder flexion. Continue OT POC.       "

## 2024-08-08 NOTE — PROGRESS NOTES
"INFECTIOUS DISEASES PROGRESS NOTE    Patient:  Erick Luong  YOB: 1955  MRN: [id number]   Admit date: 7/31/2024   Admitting Physician: Alejandrina Barnett DO  Primary Care Physician: Del Shetty MD    Chief Complaint: Patient offers no complaints      Interval History: Patient did not volunteer any complaints.  I asked him if he had talked to his wife and he said, \"today\"    Completed antibiotic course with ertapenem      Allergies:   Allergies   Allergen Reactions    Cefepime Hives and Anaphylaxis    Bactrim [Sulfamethoxazole-Trimethoprim] Other (See Comments)     \"RENAL FAILURE\"    Vancomycin Itching    Zolpidem Mental Status Change     \"makes him crazy\"    Metronidazole Rash       Current Scheduled Medications:   ascorbic acid, 1,000 mg, Oral, Daily  donepezil, 10 mg, Oral, Daily  DULoxetine, 60 mg, Oral, Daily  enoxaparin, 1 mg/kg, Subcutaneous, Q12H  famotidine, 20 mg, Oral, Q12H  insulin glargine, 10 Units, Subcutaneous, Daily  insulin regular, 3-14 Units, Subcutaneous, 4x Daily AC & at Bedtime  melatonin, 2.5 mg, Oral, Nightly  oxyCODONE, 10 mg, Oral, BID  pantoprazole, 40 mg, Oral, Q12H  polyethylene glycol, 17 g, Oral, Daily  pregabalin, 100 mg, Oral, Nightly  pregabalin, 50 mg, Oral, Daily  rosuvastatin, 10 mg, Oral, Nightly  senna-docusate sodium, 2 tablet, Oral, BID  sodium bicarbonate, 650 mg, Oral, TID  sodium chloride, 10 mL, Intravenous, Q12H  sodium chloride, 10 mL, Intravenous, Q12H  sodium hypochlorite, , Topical, Q12H      Current PRN Medications:    senna-docusate sodium **AND** polyethylene glycol **AND** bisacodyl **AND** bisacodyl    Calcium Replacement - Follow Nurse / BPA Driven Protocol    dextrose    dextrose    dextrose    glucagon (human recombinant)    Magnesium Standard Dose Replacement - Follow Nurse / BPA Driven Protocol    nitroglycerin    ondansetron    Pharmacy to Dose enoxaparin (LOVENOX)    Phosphorus Replacement - Follow Nurse / BPA Driven Protocol    " "Potassium Replacement - Follow Nurse / BPA Driven Protocol    sodium chloride    sodium chloride    sodium chloride    sodium chloride    Pharmacy to Dose enoxaparin (LOVENOX),            Objective     Vital Signs:  Temp (24hrs), Av.7 °F (36.5 °C), Min:96.8 °F (36 °C), Max:98.4 °F (36.9 °C)      /76 (BP Location: Left arm, Patient Position: Lying)   Pulse 80   Temp 97.3 °F (36.3 °C) (Oral)   Resp 16   Ht 182.9 cm (72\")   Wt 133 kg (293 lb)   SpO2 95%   BMI 39.74 kg/m²         Physical Exam:    General: The patient is lying in bed on his left side appearing to be in no acute distress  Respiratory: Effort even and unlabored  Left lower extremity with dressing intact.  It was timed for around noon today.  Faintly blood-tinged serous fluid on pad below his foot.          Results Review:    I reviewed the patient's new clinical results.    Lab Results:    CBC:   Lab Results   Lab 24  0558 24  0624   WBC 9.76 5.75 5.73 5.89 8.08   HEMOGLOBIN 10.0* 10.3* 9.5* 9.5* 9.6*   HEMATOCRIT 31.8* 33.2* 30.5* 30.1* 30.5*   PLATELETS 219 215 201 210 242        AutoDiff:   Lab Results   Lab 24  0624  0615 08/05/24  0444   NEUTROPHIL % 57.9 60.7 64.0   LYMPHOCYTE % 20.9 20.1 19.9   MONOCYTES % 8.7 9.6 9.2   EOSINOPHIL % 11.1* 7.9* 4.8   BASOPHIL % 0.9 0.7 0.7   NEUTROS ABS 3.33 3.48 3.78   LYMPHS ABS 1.20 1.15 1.17   MONOS ABS 0.50 0.55 0.54   EOS ABS 0.64* 0.45* 0.28   BASOS ABS 0.05 0.04 0.04        Manual Diff:    Lab Results   Lab 24  0605 24  0615 08/05/24  0444   NEUTROS ABS 3.33 3.48 3.78           CMP:   Lab Results   Lab 24  0605 24  0615 24  0444 24  0508 24  0758 24  0449   SODIUM 143 145 144 143 144 141   POTASSIUM 3.8 3.9 3.9 4.1 3.9 3.8   CHLORIDE 110* 111* 112* 109* 110* 109*   CO2 19.0* 21.0* 21.0* 22.0 20.0* 21.0*   BUN 31* 24* 20 18 15 15   CREATININE 1.82* 1.60* 1.47* 1.47* " 1.50* 1.42*   CALCIUM 8.3* 8.5* 8.3* 8.4* 8.6 8.5*   BILIRUBIN 0.3 0.3 0.3  --   --   --    ALK PHOS 88 95 102  --   --   --    ALT (SGPT) 7 9 9  --   --   --    AST (SGOT) 12 15 16  --   --   --    GLUCOSE 125* 97 89 101* 97 110*       Estimated Creatinine Clearance: 70.3 mL/min (A) (by C-G formula based on SCr of 1.42 mg/dL (H)).    Culture Results:    Microbiology Results (last 10 days)       Procedure Component Value - Date/Time    Wound Culture - Drainage, Foot, Left [[id number]]  (Abnormal) Collected: 08/02/24 1338    Lab Status: Final result Specimen: Drainage from Foot, Left Updated: 08/04/24 0857     Wound Culture Light growth (2+) Streptococcus agalactiae (Group B)     Comment:   This organism is considered to be universally susceptible to penicillin.  No further antibiotic testing will be performed. If Clindamycin or Erythromycin is the drug of choice, notify the laboratory within 7 days to request susceptibility testing.        Gram Stain Rare (1+) WBCs seen      No organisms seen    Wound Culture - Wound, Foot, Left [[id number]]  (Abnormal) Collected: 08/02/24 1032    Lab Status: Final result Specimen: Wound from Foot, Left Updated: 08/04/24 0857     Wound Culture Light growth (2+) Streptococcus agalactiae (Group B)     Comment:   This organism is considered to be universally susceptible to penicillin.  No further antibiotic testing will be performed. If Clindamycin or Erythromycin is the drug of choice, notify the laboratory within 7 days to request susceptibility testing.        Gram Stain Few (2+) WBCs seen      Rare (1+) Gram positive cocci    Eosinophil Smear - Urine, Urine, Clean Catch [[id number]]  (Normal) Collected: 08/01/24 1547    Lab Status: Final result Specimen: Urine, Clean Catch Updated: 08/02/24 0149     Eosinophil Smear 0 % EOS/100 Cells     MRSA Screen, PCR (Inpatient) - Swab, Nares [[id number]]  (Normal) Collected: 08/01/24 0206    Lab Status: Final result Specimen: Swab from Nares  Updated: 08/01/24 0346     MRSA PCR No MRSA Detected    Narrative:      The negative predictive value of this diagnostic test is high and should only be used to consider de-escalating anti-MRSA therapy. A positive result may indicate colonization with MRSA and must be correlated clinically.    Urine Culture - Urine, Straight Cath [[id number]]  (Abnormal)  (Susceptibility) Collected: 07/31/24 1851    Lab Status: Final result Specimen: Urine from Straight Cath Updated: 08/04/24 1129     Urine Culture >100,000 CFU/mL Escherichia coli ESBL    Narrative:      Colonization of the urinary tract without infection is common. Treatment is discouraged unless the patient is symptomatic, pregnant, or undergoing an invasive urologic procedure.  Recent outcomes data supports the use of pip/tazo in the treatment of susceptible ESBL infections for uncomplicated UTI. Consider use of pip/tazo as a carbapenem-sparing regimen in applicable patients.    Susceptibility        Escherichia coli ESBL      MATT      Amikacin Susceptible      Ertapenem Susceptible      Gentamicin Resistant      Levofloxacin Susceptible      Meropenem Susceptible      Nitrofurantoin Susceptible      Piperacillin + Tazobactam Susceptible      Tobramycin Resistant      Trimethoprim + Sulfamethoxazole Resistant                           Blood Culture - Blood, Hand, Left [[id number]]  (Normal) Collected: 07/31/24 1849    Lab Status: Final result Specimen: Blood from Hand, Left Updated: 08/05/24 1915     Blood Culture No growth at 5 days    Blood Culture - Blood, Arm, Left [[id number]]  (Abnormal) Collected: 07/31/24 1849    Lab Status: Final result Specimen: Blood from Arm, Left Updated: 08/02/24 0545     Blood Culture Staphylococcus, coagulase negative     Isolated from Aerobic Bottle     Gram Stain Aerobic Bottle Gram positive cocci in clusters    Narrative:      Probable contaminant requires clinical correlation, susceptibility not performed unless requested by  physician.      Blood Culture ID, PCR - Blood, Arm, Left [[id number]]  (Abnormal) Collected: 07/31/24 1849    Lab Status: Final result Specimen: Blood from Arm, Left Updated: 08/01/24 1117     BCID, PCR Staph spp, not aureus or lugdunensis. Identification by BCID2 PCR.     BOTTLE TYPE Aerobic Bottle                 Radiology:   Imaging Results (Last 72 Hours)       ** No results found for the last 72 hours. **                Active Hospital Problems    Diagnosis     **DKA, type 2, not at goal     E. coli UTI, ESBL     Atrial fibrillation     Chronic heart failure with preserved ejection fraction (HFpEF)     Chronic diastolic heart failure     GERD without esophagitis     Diabetic ulcer of left foot associated with type 2 diabetes mellitus        IMPRESSION:  Diabetic foot infection-cultures x 2 positive for group B streptococcus.  Patient is status post bedside debridement per SUSAN Hoffmann.  Treated with IV antibiotics, completed August 7.  Urinary tract infection with ESBL E. coli.  According to report, this was a straight catheterization specimen and patient patient did complain of some urinary frequency.  Completed short antibiotic course  History of osteomyelitis with MRSA status posttreatment with vancomycin followed by linezolid.  No MRSA isolated this admission  Uncontrolled diabetes mellitus with hemoglobin A1c greater than 12.  Noted patient is controlled here on half the insulin he reportedly was taking at home.  Chronic kidney disease stage IIIb with acute kidney injury.  Creatinine essentially stable.  Positive blood cultures for coagulase-negative staph-contaminant    RECOMMENDATION:   Continue local wound care  Hold any additional antibiotics.  If any increased erythema or indication of recurrent infection to left foot, would direct therapy towards the group B streptococcus that was isolated previously-would utilize amoxicillin or cephalexin  Awaiting placement    Cussed with Dr. Ericka MACK  MD [place]lah  08/08/24  16:21 CDT

## 2024-08-08 NOTE — PLAN OF CARE
Goal Outcome Evaluation:  Plan of Care Reviewed With: patient        Progress: no change  Outcome Evaluation: Nutrition follow up. Pt reports his appetite is about the same as previous. He cont on a C.CHO diet. He has consumed 34% of the last 4 meals. No recent sig weight changes. Pt has SNF referrals pending. He is aware of alternate food selections as needed. Cont to follow and encourage intake.

## 2024-08-08 NOTE — PAYOR COMM NOTE
"Erick Luong (69 y.o. Male)     [id number]   CONT CLINICAL   Please review clinical for Additional Days      [facility] phone     fax           Date of Birth   1955    Social Security Number       Address   [address] [place] [address]    Home Phone       MRN   [id number]       [facility] [place]    Marital Status                               Admission Date   7/31/24    Admission Type   Emergency    Admitting Provider   Alejandrina Barnett DO    Attending Provider   Alejandrina Barnett DO    Department, Room/Bed   [facility] 3C, 365/1       Discharge Date       Discharge Disposition       Discharge Destination                                 Attending Provider: Alejandrina Barnett DO    Allergies: Cefepime, Bactrim [Sulfamethoxazole-trimethoprim], Vancomycin, Zolpidem, Metronidazole    Isolation: Contact   Infection: MRSA (05/19/19), COVID (History) (08/08/22), ESBL E coli (06/30/24)   Code Status: CPR    Ht: 182.9 cm (72\")   Wt: 133 kg (293 lb)    Admission Cmt: None   Principal Problem: DKA, type 2, not at goal [E11.10]                   Active Insurance as of 7/31/2024       Primary Coverage       Payor Plan Insurance Group Employer/Plan Group    [employer] [facility] EMPLOYEE [id number]       Payor Plan Address Payor Plan Phone Number Payor Plan Fax Number Effective Dates    [address] [phone]  1/1/2022 - None Entered    [facility] [id number]         Subscriber Name Subscriber Birth Date Member ID       ZAINAB LUONG 11/27/1969 [id number]               Secondary Coverage       Payor Plan Insurance Group Employer/Plan Group    MEDICARE MEDICARE A & B        Payor Plan Address Payor Plan Phone Number Payor Plan Fax Number Effective Dates    [address] [phone]  7/1/2013 - None Entered    [facility] [id number]         Subscriber Name Subscriber Birth Date Member ID       ERICK LUONG 1955 " [id number]                     Emergency Contacts        (Rel.) Home Phone Work Phone Mobile Phone    Joan Luong (Spouse) [phone]              Current Facility-Administered Medications   Medication Dose Route Frequency Provider Last Rate Last Admin    ascorbic acid (VITAMIN C) tablet 1,000 mg  1,000 mg Oral Daily Reuben Garza APRN   1,000 mg at 08/08/24 0823    sennosides-docusate (PERICOLACE) 8.6-50 MG per tablet 2 tablet  2 tablet Oral BID Lamine Figueroa APRN   2 tablet at 08/07/24 2229    And    polyethylene glycol (MIRALAX) packet 17 g  17 g Oral Daily PRN Lamine Figueroa APRN        And    bisacodyl (DULCOLAX) EC tablet 5 mg  5 mg Oral Daily PRN Lamine Figueroa APRN        And    bisacodyl (DULCOLAX) suppository 10 mg  10 mg Rectal Daily PRN Lamine Figueroa APRN   10 mg at 08/02/24 0941    Calcium Replacement - Follow Nurse / BPA Driven Protocol   Does not apply PRN Abebe Garzon DO        dextrose (D50W) (25 g/50 mL) IV injection 10-50 mL  10-50 mL Intravenous Q15 Min PRN Abebe Garzon DO        dextrose (D50W) (25 g/50 mL) IV injection 25 g  25 g Intravenous Q15 Min PRN Lamine Figueroa APRN        dextrose (GLUTOSE) oral gel 15 g  15 g Oral Q15 Min PRN Lamine Figueroa APRN        donepezil (ARICEPT) tablet 10 mg  10 mg Oral Daily Reuben Garza APRN   10 mg at 08/08/24 0824    DULoxetine (CYMBALTA) DR capsule 60 mg  60 mg Oral Daily Reuben Garza APRN   60 mg at 08/08/24 0824    Enoxaparin Sodium (LOVENOX) syringe 135 mg  1 mg/kg Subcutaneous Q12H Abebe Garzon DO   135 mg at 08/07/24 2232    famotidine (PEPCID) tablet 20 mg  20 mg Oral Q12H Reuben Garza APRN   20 mg at 08/08/24 0824    glucagon (GLUCAGEN) injection 1 mg  1 mg Intramuscular Q15 Min PRN Lamine Figueroa APRN        insulin glargine (LANTUS, SEMGLEE) injection 10 Units  10 Units Subcutaneous Daily Keren Jamison MD   10 Units at 08/08/24 0823    insulin  regular (humuLIN R,novoLIN R) injection 3-14 Units  3-14 Units Subcutaneous 4x Daily AC & at Bedtime Keren Jamison MD   3 Units at 08/03/24 2033    Magnesium Standard Dose Replacement - Follow Nurse / BPA Driven Protocol   Does not apply PRN Abebe Garzon DO        melatonin tablet 2.5 mg  2.5 mg Oral Nightly Reuben Garza APRN   2.5 mg at 08/07/24 2230    nitroglycerin (NITROSTAT) SL tablet 0.4 mg  0.4 mg Sublingual Q5 Min PRN Lamine Figueroa APRN        ondansetron (ZOFRAN) injection 4 mg  4 mg Intravenous Q6H PRN Payam Keyes DO   4 mg at 08/07/24 2229    oxyCODONE (ROXICODONE) immediate release tablet 10 mg  10 mg Oral BID Lamine Fgiueroa APRN   10 mg at 08/08/24 0824    pantoprazole (PROTONIX) EC tablet 40 mg  40 mg Oral Q12H Reuben Garza APRN   40 mg at 08/08/24 0824    Pharmacy to Dose enoxaparin (LOVENOX)   Does not apply Continuous PRN Abebe Garzon DO        Phosphorus Replacement - Follow Nurse / BPA Driven Protocol   Does not apply PRN Abebe Garzon DO        polyethylene glycol (MIRALAX) packet 17 g  17 g Oral Daily Reuben Garza APRN   17 g at 08/08/24 0823    Potassium Replacement - Follow Nurse / BPA Driven Protocol   Does not apply PRN Abebe Garzon DO        pregabalin (LYRICA) capsule 100 mg  100 mg Oral Nightly Reuben Garza APRN   100 mg at 08/07/24 2229    pregabalin (LYRICA) capsule 50 mg  50 mg Oral Daily Reuben Garza APRN   50 mg at 08/08/24 0823    rosuvastatin (CRESTOR) tablet 10 mg  10 mg Oral Nightly Reuben Garza APRN   10 mg at 08/07/24 2231    sodium bicarbonate tablet 650 mg  650 mg Oral TID Brian Lomas MD   650 mg at 08/08/24 0824    sodium chloride 0.9 % flush 10 mL  10 mL Intravenous Q12H Abebe Garzon DO   10 mL at 08/08/24 0825    sodium chloride 0.9 % flush 10 mL  10 mL Intravenous PRN Abebe Garzon DO        sodium chloride 0.9 % flush 10 mL  10 mL Intravenous Q12H Lamine Figueroa APRN   10 mL at  "08/08/24 0823    sodium chloride 0.9 % flush 10 mL  10 mL Intravenous PRN Lamine Figueroa APRN        sodium chloride 0.9 % infusion 40 mL  40 mL Intravenous PRN Abebe Garzon DO        sodium chloride 0.9 % infusion 40 mL  40 mL Intravenous PRN Lamine Figueroa APRN        sodium hypochlorite (DAKIN'S 1/4 STRENGTH) 0.125 % topical solution 0.125% solution   Topical Q12H Aby High APRN   Given at 08/06/24 1000        Physician Progress Notes (last 24 hours)        Julia Adrian MD at 08/07/24 1511          INFECTIOUS DISEASES PROGRESS NOTE    Patient:  Erick Luong  YOB: 1955  MRN: [id number]   Admit date: 7/31/2024   Admitting Physician: Alejandrina Barnett DO  Primary Care Physician: Del Shetty MD    Chief Complaint: Still not much appetite    Interval History: Patient notes that he does not have much of an appetite still.  He is sitting up at bedside.  ELVIS Murphy getting ready to give him some of his medications.        Allergies:   Allergies   Allergen Reactions    Cefepime Hives and Anaphylaxis    Bactrim [Sulfamethoxazole-Trimethoprim] Other (See Comments)     \"RENAL FAILURE\"    Vancomycin Itching    Zolpidem Mental Status Change     \"makes him crazy\"    Metronidazole Rash       Current Scheduled Medications:   ascorbic acid, 1,000 mg, Oral, Daily  donepezil, 10 mg, Oral, Daily  DULoxetine, 60 mg, Oral, Daily  enoxaparin, 1 mg/kg, Subcutaneous, Q12H  ertapenem, 1,000 mg, Intravenous, Q24H  famotidine, 20 mg, Oral, Q12H  insulin glargine, 10 Units, Subcutaneous, Daily  insulin regular, 3-14 Units, Subcutaneous, 4x Daily AC & at Bedtime  melatonin, 2.5 mg, Oral, Nightly  oxyCODONE, 10 mg, Oral, BID  pantoprazole, 40 mg, Oral, Q12H  polyethylene glycol, 17 g, Oral, Daily  pregabalin, 100 mg, Oral, Nightly  pregabalin, 50 mg, Oral, Daily  rosuvastatin, 10 mg, Oral, Nightly  senna-docusate sodium, 2 tablet, Oral, BID  sodium bicarbonate, 650 mg, Oral, TID  sodium " "chloride, 10 mL, Intravenous, Q12H  sodium chloride, 10 mL, Intravenous, Q12H  sodium hypochlorite, , Topical, Q12H      Current PRN Medications:    senna-docusate sodium **AND** polyethylene glycol **AND** bisacodyl **AND** bisacodyl    Calcium Replacement - Follow Nurse / BPA Driven Protocol    dextrose    dextrose    dextrose    glucagon (human recombinant)    Magnesium Standard Dose Replacement - Follow Nurse / BPA Driven Protocol    nitroglycerin    ondansetron    Pharmacy to Dose enoxaparin (LOVENOX)    Phosphorus Replacement - Follow Nurse / BPA Driven Protocol    Potassium Replacement - Follow Nurse / BPA Driven Protocol    sodium chloride    sodium chloride    sodium chloride    sodium chloride    Pharmacy to Dose enoxaparin (LOVENOX),            Objective     Vital Signs:  Temp (24hrs), Av.3 °F (36.8 °C), Min:98.1 °F (36.7 °C), Max:98.6 °F (37 °C)      /76 (BP Location: Right arm, Patient Position: Lying)   Pulse 74   Temp 98.6 °F (37 °C) (Oral)   Resp 16   Ht 182.9 cm (72\")   Wt 133 kg (293 lb 3.2 oz)   SpO2 96%   BMI 39.77 kg/m²         Physical Exam:    General: Patient sitting up at bedside in no acute distress  Lungs: Fairly clear posteriorly  Left foot with dressing in place and offload shoe in place    Results Review:    I reviewed the patient's new clinical results.    Lab Results:    CBC:   Lab Results   Lab 24  1849 24  0419 24  0558 24  0605 24  0615 24  0444   WBC 15.48* 12.83* 9.76 5.75 5.73 5.89   HEMOGLOBIN 9.8* 9.0* 10.0* 10.3* 9.5* 9.5*   HEMATOCRIT 29.6* 28.3* 31.8* 33.2* 30.5* 30.1*   PLATELETS 201 176 219 215 201 210        AutoDiff:   Lab Results   Lab 24  0605 24  0615 24  0444   NEUTROPHIL % 57.9 60.7 64.0   LYMPHOCYTE % 20.9 20.1 19.9   MONOCYTES % 8.7 9.6 9.2   EOSINOPHIL % 11.1* 7.9* 4.8   BASOPHIL % 0.9 0.7 0.7   NEUTROS ABS 3.33 3.48 3.78   LYMPHS ABS 1.20 1.15 1.17   MONOS ABS 0.50 0.55 0.54   EOS ABS 0.64* " 0.45* 0.28   BASOS ABS 0.05 0.04 0.04        Manual Diff:    Lab Results   Lab 08/03/24  0605 08/04/24  0615 08/05/24  0444   NEUTROS ABS 3.33 3.48 3.78           CMP:   Lab Results   Lab 08/03/24  0605 08/04/24  0615 08/05/24  0444 08/06/24  0508 08/07/24  0758   SODIUM 143 145 144 143 144   POTASSIUM 3.8 3.9 3.9 4.1 3.9   CHLORIDE 110* 111* 112* 109* 110*   CO2 19.0* 21.0* 21.0* 22.0 20.0*   BUN 31* 24* 20 18 15   CREATININE 1.82* 1.60* 1.47* 1.47* 1.50*   CALCIUM 8.3* 8.5* 8.3* 8.4* 8.6   BILIRUBIN 0.3 0.3 0.3  --   --    ALK PHOS 88 95 102  --   --    ALT (SGPT) 7 9 9  --   --    AST (SGOT) 12 15 16  --   --    GLUCOSE 125* 97 89 101* 97       Estimated Creatinine Clearance: 66.5 mL/min (A) (by C-G formula based on SCr of 1.5 mg/dL (H)).    Culture Results:    Microbiology Results (last 10 days)       Procedure Component Value - Date/Time    Wound Culture - Drainage, Foot, Left [[id number]]  (Abnormal) Collected: 08/02/24 1338    Lab Status: Final result Specimen: Drainage from Foot, Left Updated: 08/04/24 0857     Wound Culture Light growth (2+) Streptococcus agalactiae (Group B)     Comment:   This organism is considered to be universally susceptible to penicillin.  No further antibiotic testing will be performed. If Clindamycin or Erythromycin is the drug of choice, notify the laboratory within 7 days to request susceptibility testing.        Gram Stain Rare (1+) WBCs seen      No organisms seen    Wound Culture - Wound, Foot, Left [[id number]]  (Abnormal) Collected: 08/02/24 1032    Lab Status: Final result Specimen: Wound from Foot, Left Updated: 08/04/24 0857     Wound Culture Light growth (2+) Streptococcus agalactiae (Group B)     Comment:   This organism is considered to be universally susceptible to penicillin.  No further antibiotic testing will be performed. If Clindamycin or Erythromycin is the drug of choice, notify the laboratory within 7 days to request susceptibility testing.        Gram Stain Few  (2+) WBCs seen      Rare (1+) Gram positive cocci    Eosinophil Smear - Urine, Urine, Clean Catch [[id number]]  (Normal) Collected: 08/01/24 1547    Lab Status: Final result Specimen: Urine, Clean Catch Updated: 08/02/24 0149     Eosinophil Smear 0 % EOS/100 Cells     MRSA Screen, PCR (Inpatient) - Swab, Nares [[id number]]  (Normal) Collected: 08/01/24 0206    Lab Status: Final result Specimen: Swab from Nares Updated: 08/01/24 0346     MRSA PCR No MRSA Detected    Narrative:      The negative predictive value of this diagnostic test is high and should only be used to consider de-escalating anti-MRSA therapy. A positive result may indicate colonization with MRSA and must be correlated clinically.    Urine Culture - Urine, Straight Cath [[id number]]  (Abnormal)  (Susceptibility) Collected: 07/31/24 1851    Lab Status: Final result Specimen: Urine from Straight Cath Updated: 08/04/24 1129     Urine Culture >100,000 CFU/mL Escherichia coli ESBL    Narrative:      Colonization of the urinary tract without infection is common. Treatment is discouraged unless the patient is symptomatic, pregnant, or undergoing an invasive urologic procedure.  Recent outcomes data supports the use of pip/tazo in the treatment of susceptible ESBL infections for uncomplicated UTI. Consider use of pip/tazo as a carbapenem-sparing regimen in applicable patients.    Susceptibility        Escherichia coli ESBL      MATT      Amikacin Susceptible      Ertapenem Susceptible      Gentamicin Resistant      Levofloxacin Susceptible      Meropenem Susceptible      Nitrofurantoin Susceptible      Piperacillin + Tazobactam Susceptible      Tobramycin Resistant      Trimethoprim + Sulfamethoxazole Resistant                           Blood Culture - Blood, Hand, Left [[id number]]  (Normal) Collected: 07/31/24 1849    Lab Status: Final result Specimen: Blood from Hand, Left Updated: 08/05/24 1915     Blood Culture No growth at 5 days    Blood Culture - Blood,  Arm, Left [[id number]]  (Abnormal) Collected: 07/31/24 1849    Lab Status: Final result Specimen: Blood from Arm, Left Updated: 08/02/24 0545     Blood Culture Staphylococcus, coagulase negative     Isolated from Aerobic Bottle     Gram Stain Aerobic Bottle Gram positive cocci in clusters    Narrative:      Probable contaminant requires clinical correlation, susceptibility not performed unless requested by physician.      Blood Culture ID, PCR - Blood, Arm, Left [[id number]]  (Abnormal) Collected: 07/31/24 1849    Lab Status: Final result Specimen: Blood from Arm, Left Updated: 08/01/24 1117     BCID, PCR Staph spp, not aureus or lugdunensis. Identification by BCID2 PCR.     BOTTLE TYPE Aerobic Bottle                 Radiology:   Imaging Results (Last 72 Hours)       ** No results found for the last 72 hours. **                Active Hospital Problems    Diagnosis     **DKA, type 2, not at goal     E. coli UTI, ESBL     Atrial fibrillation     Chronic heart failure with preserved ejection fraction (HFpEF)     Chronic diastolic heart failure     GERD without esophagitis     Diabetic ulcer of left foot associated with type 2 diabetes mellitus        IMPRESSION:  Diabetic foot infection-cultures x 2 positive for group B streptococcus.  Patient is status post bedside debridement per SUSAN Hoffmann  Urinary tract infection with ESBL E. coli.  According to report, this was a straight catheterization specimen and patient patient did complain of some urinary frequency.  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  Uncontrolled diabetes mellitus with hemoglobin A1c greater than 12.  Noted patient is controlled here on half the insulin he reportedly was taking at home.  Chronic kidney disease stage IIIb with acute kidney injury.  Creatinine essentially stable.  Positive blood cultures for coagulase-negative staph-contaminant    RECOMMENDATION:   Discontinue ertapenem after  today's dose.  Will have nursing place updated photo in chart of left foot heel wound as well as lateral foot  Will reevaluate tomorrow for need of any additional oral antibiotic therapy.  Diabetes management per attending  Noted recommendations for skilled nursing facility for better wound care in this patient      Review Dr. Barnett's note  Reviewed neurology note    Julia Adrian MD  08/07/24  15:11 CDT        Electronically signed by Julia Adrian MD at 08/07/24 1604       Alejandrina Barnett DO at 08/07/24 1443              [facility] Medicine Services  INPATIENT PROGRESS NOTE    Patient Name: Erick Luong  Date of Admission: 7/31/2024  Today's Date: 08/07/24  Length of Stay: 7  Primary Care Physician: Del Shetty MD    Subjective   Chief Complaint: diabetic foot ulcer.   HPI   Patient sitting up in chair.  He is without major complaint at this time.  Patient is not very conversant.  This is his usual demeanor.  He did not qualify for transfer to LTAC.    Patient is stable and is ready for transition to a lower level of care.  Skilled nursing facility would be appropriate.    Blood sugars while he has been in the hospital have remained controlled on approximately half of the amount of insulin he is to take at home.      Review of Systems   All pertinent negatives and positives are as above. All other systems have been reviewed and are negative unless otherwise stated.     Objective    Temp:  [98.1 °F (36.7 °C)-98.6 °F (37 °C)] 98.6 °F (37 °C)  Heart Rate:  [67-97] 74  Resp:  [16-18] 16  BP: (139-161)/(61-77) 146/76  Physical Exam  Vitals and nursing note reviewed.   Constitutional:       Appearance: He is obese.   HENT:      Head: Normocephalic and atraumatic.      Right Ear: External ear normal.      Left Ear: External ear normal.      Nose: Nose normal.      Mouth/Throat:      Mouth: Mucous membranes are moist.   Eyes:      Extraocular Movements:  Extraocular movements intact.      Conjunctiva/sclera: Conjunctivae normal.      Pupils: Pupils are equal, round, and reactive to light.   Cardiovascular:      Rate and Rhythm: Normal rate and regular rhythm.      Pulses: Normal pulses.      Heart sounds: Normal heart sounds.   Pulmonary:      Effort: Pulmonary effort is normal.   Abdominal:      General: Bowel sounds are normal.      Palpations: Abdomen is soft.   Musculoskeletal:      Cervical back: Normal range of motion.      Comments: Moves all extremities   Skin:     General: Skin is warm and dry.      Capillary Refill: Capillary refill takes less than 2 seconds.      Comments: Dressing left foot/heel intact.   Neurological:      Mental Status: He is alert and oriented to person, place, and time.   Psychiatric:      Comments: After ACT is flat.             Results Review:  I have reviewed the labs, radiology results, and diagnostic studies.    Laboratory Data:   Results from last 7 days   Lab Units 08/05/24  0444 08/04/24  0615 08/03/24  0605   WBC 10*3/mm3 5.89 5.73 5.75   HEMOGLOBIN g/dL 9.5* 9.5* 10.3*   HEMATOCRIT % 30.1* 30.5* 33.2*   PLATELETS 10*3/mm3 210 201 215        Results from last 7 days   Lab Units 08/07/24  0758 08/06/24  0508 08/05/24  0444 08/04/24  0615 08/03/24  0605   SODIUM mmol/L 144 143 144 145 143   POTASSIUM mmol/L 3.9 4.1 3.9 3.9 3.8   CHLORIDE mmol/L 110* 109* 112* 111* 110*   CO2 mmol/L 20.0* 22.0 21.0* 21.0* 19.0*   BUN mg/dL 15 18 20 24* 31*   CREATININE mg/dL 1.50* 1.47* 1.47* 1.60* 1.82*   CALCIUM mg/dL 8.6 8.4* 8.3* 8.5* 8.3*   BILIRUBIN mg/dL  --   --  0.3 0.3 0.3   ALK PHOS U/L  --   --  102 95 88   ALT (SGPT) U/L  --   --  9 9 7   AST (SGOT) U/L  --   --  16 15 12   GLUCOSE mg/dL 97 101* 89 97 125*       Culture Data:   Blood Culture   Date Value Ref Range Status   07/31/2024 No growth at 5 days  Final   07/31/2024 Staphylococcus, coagulase negative (C)  Final     Urine Culture   Date Value Ref Range Status   07/31/2024  >100,000 CFU/mL Escherichia coli ESBL (A)  Final     Wound Culture   Date Value Ref Range Status   08/02/2024 (A)  Final    Light growth (2+) Streptococcus agalactiae (Group B)     Comment:       This organism is considered to be universally susceptible to penicillin.  No further antibiotic testing will be performed. If Clindamycin or Erythromycin is the drug of choice, notify the laboratory within 7 days to request susceptibility testing.   08/02/2024 (A)  Final    Light growth (2+) Streptococcus agalactiae (Group B)     Comment:       This organism is considered to be universally susceptible to penicillin.  No further antibiotic testing will be performed. If Clindamycin or Erythromycin is the drug of choice, notify the laboratory within 7 days to request susceptibility testing.       Radiology Data:   Imaging Results (Last 24 Hours)       ** No results found for the last 24 hours. **            I have reviewed the patient's current medications.     Assessment/Plan   Assessment  Active Hospital Problems    Diagnosis     **DKA, type 2, not at goal     E. coli UTI, ESBL     Atrial fibrillation     Chronic heart failure with preserved ejection fraction (HFpEF)     Chronic diastolic heart failure     GERD without esophagitis     Diabetic ulcer of left foot associated with type 2 diabetes mellitus        Treatment Plan  Discussed with the patient the need for continued care and treatment of the left foot wound.  Patient is unable to take care of himself effectively at home.  On admission apparently Adult Protective Services was contacted.  I have recommended to the patient again skilled nursing until such a time that the wound is healed.  We discussed that if he does not he will most likely lose the foot secondary to the wound with worsening of wound and infections.  Patient says he is willing to consider going to a nursing home.  I discussed this with  and they will discuss with him to see where he would  like to go.       Medical Decision Making  Number and Complexity of problems:   3 acute, high complexity problems  4+ chronic, moderate complexity problems     Differential Diagnosis: None     Conditions and Status        Condition is improving.     [facility] Data  External documents reviewed: Care Everywhere  Cardiac tracing (EKG, telemetry) interpretation: See HPI  Radiology interpretation: See HPI  Labs reviewed: See HPI  Any tests that were considered but not ordered: None     Decision rules/scores evaluated (example [id number], Wells, etc): None     Discussed with: The patient     Care Planning  Shared decision making: Patient  Code status and discussions: Full code     Disposition  Social Determinants of Health that impact treatment or disposition: none     I expect the patient to be discharged to SNF  in 1-2 days.    Electronically signed by Alejandrina Barnett DO, 08/07/24, 14:43 CDT.      Electronically signed by Alejandrina Barnett DO at 08/07/24 1449       Stewart Alvarez APRN at 08/07/24 1007       Attestation signed by Willy Arteaga MD at 08/07/24 1715    I have independently interviewed and examined the patient and reviewed the laboratory, imaging, notes and all other records as available.  I have discussed key elements of the care plan with the APRN and agree with the findings and care plan documented above except as noted.      Subjective:  Initially acute kidney injury. Baseline chronic kidney disease stage 3b. Baseline creatinine approximately 1.5-1.8. Follows in our office.  History of poorly controlled type 2 diabetes, hypertension, coronary artery disease, diabetic foot ulcer, obesity.  On 7/31, patient was found wandering at home confused.  He was brought to ER for evaluation. He has a nonhealing left foot diabetic ulcer with serosanguineous drainage. Blood glucose level was >700 with A1c >12%. Initial creatinine was 2.67 and has steadily improved since he was admitted.  Nephrology consulted on 8/1. Hospital course remarkable for improving renal function and improved blood glucose levels. Moved to medical floor after stabilization.    Today, no overnight events.  Hopeful for discharge soon.  Denied current chest pain, shortness of air at rest, nausea or vomiting.  Up in chair with feet down.    Objective:  Vitals/labs/studies/notes all reviewed  Exam independently verified with additional comments or findings as noted:  Ext: Minimal lower extremity edema    Assessment:  Acute kidney injury/ATN  Chronic kidney disease stage IIIb  Hypertension  Diabetes type 2  Diabetic foot ulcer with sepsis  Anemia chronic kidney disease  Obesity  Metabolic acidosis    Plan:  Discussed with patient, nursing  Workup reviewed today  Monitor labs  Wean IV fluids  ID evaluation reviewed   Antibiotics per ID-finishing ertapenem on 8/7    Willy Arteaga MD  8/7/2024  17:15 CDT        This documentation may indicate a split shared visit as defined in CMS Final rule (CY) 2024 Physician Fee Schedule dated 11/2/2023: for Medicare billing purposes, the “substantive portion” means more than half of the total time spent by the physician or nonphysician practitioner performing the split (or shared) visit, or a substantive part of the medical decision making.  This visit involved face to face encounters with the patient by both providers on the date of the visit.  Time spent is indicated to identify the substantive portion of the visit, whereas the level of service may be determined by complexity of medical decision making.                      Nephrology ([facility]) Progress Note      Patient:  Erick Luong  YOB: 1955  Date of Service: 8/7/2024  MRN: [id number]   Acct: [id number]   Primary Care Physician: Del Shetty MD  Advance Directive:   Code Status and Medical Interventions: CPR (Attempt to Resuscitate); Full Support   Ordered at: 08/01/24 0053      Level Of Support Discussed With:    Patient     Code Status (Patient has no pulse and is not breathing):    CPR (Attempt to Resuscitate)     Medical Interventions (Patient has pulse or is breathing):    Full Support     Admit Date: 7/31/2024       Hospital Day: 7  Referring Provider: Abebe Garzon DO      Patient personally seen and examined.  Complete chart including Consults, Notes, Operative Reports, Labs, Cardiology, and Radiology studies reviewed as able.        Subjective:  Erick Luong is a 69 y.o. male for whom we were consulted for evaluation and treatment of acute kidney injury. Baseline chronic kidney disease stage 3b. Baseline creatinine approximately 1.5-1.8. Follows in our office.  History of poorly controlled type 2 diabetes, hypertension, coronary artery disease, diabetic foot ulcer, obesity.  On 7/31 patient was found wandering at home, confused. Brought to ER for evaluation. Has a nonhealing left foot diabetic ulcer with serosanguineous drainage. Blood glucose level was >700 with A1c >12%. Initial creatinine was 2.67 and has steadily improved since he was admitted. Nephrology consulted on 8/01. Hospital course remarkable for improving renal function and improved blood glucose levels. Moved to medical floor    Today is awake and alert. No new complaints or new overnight issues. Urine output nonoliguric    Allergies:  Cefepime, Bactrim [sulfamethoxazole-trimethoprim], Vancomycin, Zolpidem, and Metronidazole    Home Meds:  Medications Prior to Admission   Medication Sig Dispense Refill Last Dose    ascorbic acid (VITAMIN C) 1000 MG tablet Take 1 tablet by mouth Daily. 30 tablet 3     bumetanide (BUMEX) 1 MG tablet Take 1 tablet by mouth 2 (Two) Times a Day for 30 days. 60 tablet 0     busPIRone (BUSPAR) 10 MG tablet Take 1 tablet by mouth 2 (Two) Times a Day.       calcitriol (ROCALTROL) 0.5 MCG capsule Take 1 capsule by mouth Daily. 90 capsule 4     carvedilol (COREG) 3.125 MG tablet Take 1  tablet twice a day by oral route. 60 tablet 4     donepezil (ARICEPT) 10 MG tablet Take 1 tablet by mouth Daily. 90 tablet 1     DULoxetine (CYMBALTA) 60 MG capsule Take 1 capsule by mouth Daily. 90 capsule 4     famotidine (PEPCID) 20 MG tablet Take 1 tablet twice a day by oral route. 180 tablet 4     Insulin Glargine (Lantus SoloStar) 100 UNIT/ML injection pen Inject 20 Units under the skin into the appropriate area as directed every night at bedtime. 15 mL 3     Insulin Regular Human, Conc, (HumuLIN R) 500 UNIT/ML solution pen-injector CONCENTRATED injection Inject 40 Units under the skin into the appropriate area as directed 2 (Two) Times a Day Before Meals. Inject 40 units under the skin in the the appropriate area with regular meals AND 40 units with large meals.       Iron-Vitamin C (Vitron-C)  MG tablet Take 1 tablet twice a day by oral route. 60 tablet 2     levocetirizine (XYZAL) 5 MG tablet Take 1 tablet by mouth every day at bedtime. 30 tablet 2     melatonin 3 MG tablet Take 2 tablets by mouth At Night As Needed for Sleep. 30 tablet 0     oxyCODONE (ROXICODONE) 10 MG tablet Take 1 tablet by mouth 2 (Two) Times a Day As Needed. Must last 30 days per md. 55 tablet 0     pantoprazole (PROTONIX) 40 MG EC tablet Take 1 tablet by mouth Every 12 (Twelve) Hours. 180 tablet 4     pregabalin (LYRICA) 100 MG capsule Take 1 capsule by mouth Daily.       pregabalin (LYRICA) 100 MG capsule Take 2 capsules by mouth every night at bedtime.       rosuvastatin (CRESTOR) 10 MG tablet Take 1 tablet by mouth Every Night. 30 tablet 2     sucralfate (CARAFATE) 1 g tablet Take 1 tablet by mouth 4 (Four) Times a Day before meals. 360 tablet 1     Diclofenac Sodium (VOLTAREN) 1 % gel gel Apply 2 g topically to the appropriate area as directed 4 (Four) Times a Day As Needed. 300 g 11     nitroglycerin (NITROSTAT) 0.4 MG SL tablet Place 1 tablet under the tongue Every 5 (Five) Minutes As Needed for Chest Pain. Take no more  than 3 doses in 15 minutes.       polyethylene glycol (MIRALAX) 17 GM/SCOOP powder Take 17 g by mouth Daily As Needed (constipation).       sennosides-docusate (PERICOLACE) 8.6-50 MG per tablet Take 1 tablet by mouth Every Night. Obtain OTC          Medicines:  Current Facility-Administered Medications   Medication Dose Route Frequency Provider Last Rate Last Admin    ascorbic acid (VITAMIN C) tablet 1,000 mg  1,000 mg Oral Daily Reuben Garza APRN   1,000 mg at 08/06/24 0958    sennosides-docusate (PERICOLACE) 8.6-50 MG per tablet 2 tablet  2 tablet Oral BID Lamine Figueroa APRN   2 tablet at 08/06/24 2050    And    polyethylene glycol (MIRALAX) packet 17 g  17 g Oral Daily PRN Lamine Figueroa APRN        And    bisacodyl (DULCOLAX) EC tablet 5 mg  5 mg Oral Daily PRN Lamine Figueroa APRN        And    bisacodyl (DULCOLAX) suppository 10 mg  10 mg Rectal Daily PRN Lamine Figueroa APRN   10 mg at 08/02/24 0941    Calcium Replacement - Follow Nurse / BPA Driven Protocol   Does not apply PRN Abebe Garzon DO        dextrose (D50W) (25 g/50 mL) IV injection 10-50 mL  10-50 mL Intravenous Q15 Min PRN Abebe Garzon DO        dextrose (D50W) (25 g/50 mL) IV injection 25 g  25 g Intravenous Q15 Min PRN Lamine Figueroa APRN        dextrose (GLUTOSE) oral gel 15 g  15 g Oral Q15 Min PRN Lamine Figueroa APRN        donepezil (ARICEPT) tablet 10 mg  10 mg Oral Daily Reuben Garza APRN   10 mg at 08/06/24 0958    DULoxetine (CYMBALTA) DR capsule 60 mg  60 mg Oral Daily Reuben Garza APRN   60 mg at 08/06/24 0958    Enoxaparin Sodium (LOVENOX) syringe 135 mg  1 mg/kg Subcutaneous Q12H Abebe Garzon DO   135 mg at 08/06/24 2050    ertapenem (INVanz) 1,000 mg in sodium chloride 0.9 % 100 mL MBP  1,000 mg Intravenous Q24H Tami-Marshall, Julia J,  mL/hr at 08/06/24 1516 1,000 mg at 08/06/24 1516    famotidine (PEPCID) tablet 20 mg  20 mg Oral Q12H Reuben Garza APRN   20 mg at 08/06/24  2050    ferric gluconate (FERRLECIT) 250 mg in sodium chloride 0.9 % 250 mL IVPB  250 mg Intravenous Daily Brian Lomas  mL/hr at 08/06/24 1213 250 mg at 08/06/24 1213    glucagon (GLUCAGEN) injection 1 mg  1 mg Intramuscular Q15 Min PRN Lamine Figueroa APRN        insulin glargine (LANTUS, SEMGLEE) injection 10 Units  10 Units Subcutaneous Daily Keren Jamison MD   10 Units at 08/06/24 0958    insulin regular (humuLIN R,novoLIN R) injection 3-14 Units  3-14 Units Subcutaneous 4x Daily AC & at Bedtime Keren Jamison MD   3 Units at 08/03/24 2033    Magnesium Standard Dose Replacement - Follow Nurse / BPA Driven Protocol   Does not apply PRN Abebe Garzon DO        melatonin tablet 2.5 mg  2.5 mg Oral Nightly Reuben Garza APRN   2.5 mg at 08/06/24 2050    nitroglycerin (NITROSTAT) SL tablet 0.4 mg  0.4 mg Sublingual Q5 Min PRN Lamine Figueroa APRN        ondansetron (ZOFRAN) injection 4 mg  4 mg Intravenous Q6H PRN Payam Keyes DO   4 mg at 08/05/24 0800    oxyCODONE (ROXICODONE) immediate release tablet 10 mg  10 mg Oral BID Alejandrina Barnett DO   10 mg at 08/06/24 2049    pantoprazole (PROTONIX) EC tablet 40 mg  40 mg Oral Q12H Reuben Garza APRN   40 mg at 08/06/24 2050    Pharmacy to Dose enoxaparin (LOVENOX)   Does not apply Continuous PRN Abebe Garzon DO        Phosphorus Replacement - Follow Nurse / BPA Driven Protocol   Does not apply PRN Abebe Garzon DO        polyethylene glycol (MIRALAX) packet 17 g  17 g Oral Daily Reuben Garza APRN   17 g at 08/06/24 0959    Potassium Replacement - Follow Nurse / BPA Driven Protocol   Does not apply PRN Abebe Garzon DO        pregabalin (LYRICA) capsule 100 mg  100 mg Oral Nightly Reuben Garza APRN   100 mg at 08/06/24 2050    pregabalin (LYRICA) capsule 50 mg  50 mg Oral Daily Reuben Garza APRN   50 mg at 08/06/24 0958    rosuvastatin (CRESTOR) tablet 10 mg  10 mg Oral Nightly Reuben Garza,  APRN   10 mg at 08/06/24 2050    sodium bicarbonate tablet 650 mg  650 mg Oral TID Brian Lomas MD   650 mg at 08/06/24 2050    sodium chloride 0.9 % flush 10 mL  10 mL Intravenous Q12H Abebe Garzon, DO   10 mL at 08/06/24 2050    sodium chloride 0.9 % flush 10 mL  10 mL Intravenous PRN Abebe Garzon,         sodium chloride 0.9 % flush 10 mL  10 mL Intravenous Q12H Lamine Figueroa, APRN   10 mL at 08/06/24 2050    sodium chloride 0.9 % flush 10 mL  10 mL Intravenous PRN Lamine Figueroa APRN        sodium chloride 0.9 % infusion 40 mL  40 mL Intravenous PRN Abebe Garzon,         sodium chloride 0.9 % infusion 40 mL  40 mL Intravenous PRN Lamine Figueroa APRN        sodium hypochlorite (DAKIN'S 1/4 STRENGTH) 0.125 % topical solution 0.125% solution   Topical Q12H Aby High, APRN   Given at 08/06/24 1000       Past Medical History:  Past Medical History:   Diagnosis Date    Arthritis     Autonomic disease     CAD (coronary artery disease) 02/06/2017    Cervical radiculopathy 09/16/2021    Chronic constipation with acute exaccerbation 05/10/2021    Coronary artery disease     Degeneration of cervical intervertebral disc 08/11/2021    Diabetes mellitus     Diabetic foot ulcer 08/31/2020    Diabetic polyneuropathy associated with type 2 diabetes mellitus 01/18/2021    Elevated cholesterol     Gastroesophageal reflux disease 05/13/2019    Headache     HTN (hypertension), benign 05/03/2017    Hyperlipidemia     Hypertension     Mixed hyperlipidemia 02/07/2017    Multiple lung nodules 01/26/2020    5mm, 9 mm RLL identified 1/2020, not present 10/2019.    Myocardial infarction     Osteomyelitis 01/22/2020    Osteomyelitis of fifth toe of right foot 10/07/2019    Pancreatitis     Persistent insomnia 01/20/2020    Renal disorder     Sleep apnea 02/06/2017    Sleep apnea with use of continuous positive airway pressure (CPAP)     NON-COMPLIANT    Slow transit constipation 01/16/2019    Spinal  stenosis in cervical region 09/16/2021    Vitamin D deficiency 03/02/2021       Past Surgical History:  Past Surgical History:   Procedure Laterality Date    ABDOMINAL SURGERY      AMPUTATION FOOT / TOE Left 10/2021    5th digit     ANTERIOR CERVICAL DISCECTOMY W/ FUSION N/A 08/05/2022    Procedure: CERVICAL DISCECTOMY ANTERIOR WITH FUSION C5-6 with possible plating of C5-7 with neuromonitoring and 1 c-arm;  Surgeon: Karel Soliz MD;  Location: [facility] OR;  Service: Neurosurgery;  Laterality: N/A;    APPENDECTOMY      BACK SURGERY      CARDIAC CATHETERIZATION Left 02/08/2021    Procedure: Left Heart Cath w poss intervention left anatomical snuff box acess;  Surgeon: Omkar Charles DO;  Location:  [facility]ATH [facility]ASIVE [facility];  Service: Cardiology;  Laterality: Left;    CARDIAC SURGERY      CATARACT EXTRACTION      CERVICAL SPINE SURGERY      COLONOSCOPY N/A 01/31/2017    Normal exam repeat in 5 years    COLONOSCOPY N/A 02/11/2019    Mild acute inflammation    COLONOSCOPY N/A 04/07/2024    2 areas at 10 and 20 cm with friability ulceration 2 clips placed at 20 cm and 4 clips at 10 cm poor prep normal mucosa,mild eroisions and ulcerations in visible vessels    COLONOSCOPY N/A 7/1/2024    Procedure: COLONOSCOPY WITH ANESTHESIA;  Surgeon: Arsalan Lorenzo DO;  Location: [facility] ENDOSCOPY;  Service: Gastroenterology;  Laterality: N/A;  pre op constipation/diarrhea  post poor prep  pcp Del Shetty    COLONOSCOPY N/A 7/2/2024    Procedure: COLONOSCOPY WITH ANESTHESIA;  Surgeon: Agapito Christopher MD;  Location: [facility] ENDOSCOPY;  Service: Gastroenterology;  Laterality: N/A;  pre rectal bleeding  post poor prep  pcp Del Shetty MD    COLONOSCOPY W/ POLYPECTOMY  03/04/2014    Hyperplastic polyp    CORONARY ARTERY BYPASS GRAFT  10/2015    ENDOSCOPY  04/13/2011    Gastritis with hemorrhage    ENDOSCOPY N/A 05/05/2017    Normal exam    ENDOSCOPY N/A 02/11/2019    Gastritis    ENDOSCOPY N/A 09/01/2020     Non-erosive gastritis with hemorrhage    ENDOSCOPY N/A 02/10/2021    Esophagitis    ENDOSCOPY N/A 2024    There were esophageal mucosal changes suspicious for short-segment Low's esophagus present in the distal esophagus. The maximum longitudinal extent of these mucosal changes was 2 cm in length. Mucosa was biopsied with a cold forceps for histologyDistal esophagus, biopsies: Mild chronic active esophagogastritis. No evidence of intestinal metaplasia, dysplasia. Antrum, bx, Mild chronic gastritis    FOOT SURGERY Left     INCISION AND DRAINAGE OF WOUND Left 2007    spider bite       Family History  Family History   Problem Relation Age of Onset    Colon cancer Father     Heart disease Father     Colon cancer Sister     Colon polyps Sister     Alzheimer's disease Mother     Coronary artery disease Sister     Coronary artery disease Sister        Social History  Social History     Socioeconomic History    Marital status:    Tobacco Use    Smoking status: Former     Current packs/day: 0.00     Types: Cigarettes     Quit date:      Years since quittin.6    Smokeless tobacco: Never    Tobacco comments:     smoked [place] [employer] [employer]   Vaping Use    Vaping status: Never Used   Substance and Sexual Activity    Alcohol use: No    Drug use: No    Sexual activity: Defer       Review of Systems:  History obtained from chart review and the patient  General ROS: No fever or chills  Respiratory ROS: No cough, shortness of breath, wheezing  Cardiovascular ROS: No chest pain or palpitations  Gastrointestinal ROS: No abdominal pain or melena  Genito-Urinary ROS: No dysuria or hematuria  Psych ROS: No anxiety and depression  14 point ROS reviewed with the patient and negative except as noted above and in the HPI unless unable to obtain.    Objective:  Patient Vitals for the past 24 hrs:   BP Temp Temp src Pulse Resp SpO2 Weight   24 0837 146/76 98.6 °F (37 °C) Oral 74 16 96 % --   24 0525 -- -- --  -- -- -- 133 kg (293 lb 3.2 oz)   08/07/24 0315 139/77 98.2 °F (36.8 °C) Oral 67 16 95 % --   08/06/24 1930 142/61 98.3 °F (36.8 °C) Oral 67 16 96 % --   08/06/24 1616 161/71 98.1 °F (36.7 °C) Oral 97 18 97 % --   08/06/24 1151 169/95 98.5 °F (36.9 °C) Oral 74 17 98 % --     No intake or output data in the 24 hours ending 08/07/24 1007    General: awake/alert   Chest:  clear to auscultation bilaterally without respiratory distress  CVS: regular rate and rhythm  Abdominal: soft, nontender, positive bowel sounds  Extremities: no cyanosis or edema  Skin:  left foot ulcer and warm and dry without rash      Labs:  Results from last 7 days   Lab Units 08/05/24  0444 08/04/24  0615 08/03/24  0605   WBC 10*3/mm3 5.89 5.73 5.75   HEMOGLOBIN g/dL 9.5* 9.5* 10.3*   HEMATOCRIT % 30.1* 30.5* 33.2*   PLATELETS 10*3/mm3 210 201 215         Results from last 7 days   Lab Units 08/07/24  0758 08/06/24  0508 08/05/24  0444 08/04/24  0615 08/03/24  0605   SODIUM mmol/L 144 143 144 145 143   POTASSIUM mmol/L 3.9 4.1 3.9 3.9 3.8   CHLORIDE mmol/L 110* 109* 112* 111* 110*   CO2 mmol/L 20.0* 22.0 21.0* 21.0* 19.0*   BUN mg/dL 15 18 20 24* 31*   CREATININE mg/dL 1.50* 1.47* 1.47* 1.60* 1.82*   CALCIUM mg/dL 8.6 8.4* 8.3* 8.5* 8.3*   EGFR mL/min/1.73 50.4* 51.6* 51.6* 46.6* 40.0*   BILIRUBIN mg/dL  --   --  0.3 0.3 0.3   ALK PHOS U/L  --   --  102 95 88   ALT (SGPT) U/L  --   --  9 9 7   AST (SGOT) U/L  --   --  16 15 12   GLUCOSE mg/dL 97 101* 89 97 125*       Radiology:   Imaging Results (Last 72 Hours)       ** No results found for the last 72 hours. **            Culture:  Blood Culture   Date Value Ref Range Status   07/31/2024 No growth at 24 hours  Preliminary   07/31/2024 Staphylococcus, coagulase negative (C)  Final         Assessment    Acute kidney injury, ATN--resolving  Baseline chronic kidney disease stage 3b  Type 2 diabetes  Hypertension  Sepsis related to diabetic foot ulcer  Anemia of CKD  Obesity     Plan:   Encourage  compliance with medications and diabetic diet  Renal function stable   Monitor labs      Stewart Alvarez, APRN  8/7/2024  10:07 CDT      Electronically signed by Willy Arteaga MD at 08/07/24 1715         PRN  8/7  Zofran iv x2         8/7 Patient a&o, new IV per vats, Up to chair most of shift. Encourage oral intake - poor appetite. No c/o pain. c/o nausea x1 - no emesis noted. Drsg change orders.     Cont contact isolation

## 2024-08-08 NOTE — PROGRESS NOTES
[facility] Medicine Services  INPATIENT PROGRESS NOTE    Patient Name: Erick Luong  Date of Admission: 7/31/2024  Today's Date: 08/08/24  Length of Stay: 8  Primary Care Physician: Del Shetty MD    Subjective   Chief Complaint: diabetic foot ulcer.   HPI     Patient lying in bed.   No new complaints.   Awaiting nursing home for continued wound care.    No new problems         Review of Systems   All pertinent negatives and positives are as above. All other systems have been reviewed and are negative unless otherwise stated.     Objective    Temp:  [96.8 °F (36 °C)-98.4 °F (36.9 °C)] 96.8 °F (36 °C)  Heart Rate:  [69-77] 69  Resp:  [16] 16  BP: (131-147)/(58-73) 131/71  Physical Exam  Vitals and nursing note reviewed.   Constitutional:       Appearance: He is obese.   HENT:      Head: Normocephalic and atraumatic.      Right Ear: External ear normal.      Left Ear: External ear normal.      Nose: Nose normal.      Mouth/Throat:      Mouth: Mucous membranes are moist.   Eyes:      Extraocular Movements: Extraocular movements intact.      Conjunctiva/sclera: Conjunctivae normal.      Pupils: Pupils are equal, round, and reactive to light.   Cardiovascular:      Rate and Rhythm: Normal rate and regular rhythm.      Pulses: Normal pulses.      Heart sounds: Normal heart sounds.   Pulmonary:      Effort: Pulmonary effort is normal.   Abdominal:      General: Bowel sounds are normal.      Palpations: Abdomen is soft.   Musculoskeletal:      Cervical back: Normal range of motion.      Comments: Moves all extremities   Skin:     General: Skin is warm and dry.      Capillary Refill: Capillary refill takes less than 2 seconds.      Comments: Dressing left foot/heel intact.   Neurological:      Mental Status: He is alert and oriented to person, place, and time.   Psychiatric:      Comments: Affect  is flat.             File Link    Scan on 8/7/2024 1703 by Aicha Smallwood RN:  Wound Left lateral foot Diabetic Ulcer        Key Information    Document ID File Type Document Type Description   [id number] Image WOUND IMAGE Wound Left lateral foot Diabetic Ulcer     Import Information    Attached At Date Time User Dept   Encounter Level 8/7/2024  5:02 PM Aicha Smallwood, RN  [facility]     Encounter    Hospital Encounter on 7/31/24             Results Review:  I have reviewed the labs, radiology results, and diagnostic studies.    Laboratory Data:   Results from last 7 days   Lab Units 08/08/24  0449 08/05/24  0444 08/04/24  0615   WBC 10*3/mm3 8.08 5.89 5.73   HEMOGLOBIN g/dL 9.6* 9.5* 9.5*   HEMATOCRIT % 30.5* 30.1* 30.5*   PLATELETS 10*3/mm3 242 210 201        Results from last 7 days   Lab Units 08/08/24  0449 08/07/24  0758 08/06/24  0508 08/05/24  0444 08/04/24  0615 08/03/24  0605   SODIUM mmol/L 141 144 143 144 145 143   POTASSIUM mmol/L 3.8 3.9 4.1 3.9 3.9 3.8   CHLORIDE mmol/L 109* 110* 109* 112* 111* 110*   CO2 mmol/L 21.0* 20.0* 22.0 21.0* 21.0* 19.0*   BUN mg/dL 15 15 18 20 24* 31*   CREATININE mg/dL 1.42* 1.50* 1.47* 1.47* 1.60* 1.82*   CALCIUM mg/dL 8.5* 8.6 8.4* 8.3* 8.5* 8.3*   BILIRUBIN mg/dL  --   --   --  0.3 0.3 0.3   ALK PHOS U/L  --   --   --  102 95 88   ALT (SGPT) U/L  --   --   --  9 9 7   AST (SGOT) U/L  --   --   --  16 15 12   GLUCOSE mg/dL 110* 97 101* 89 97 125*       Culture Data:   Blood Culture   Date Value Ref Range Status   07/31/2024 No growth at 5 days  Final   07/31/2024 Staphylococcus, coagulase negative (C)  Final     Urine Culture   Date Value Ref Range Status   07/31/2024 >100,000 CFU/mL Escherichia coli ESBL (A)  Final     Wound Culture   Date Value Ref Range Status   08/02/2024 (A)  Final    Light growth (2+) Streptococcus agalactiae (Group B)     Comment:       This organism is considered to be universally susceptible to penicillin.  No further antibiotic testing will be performed. If Clindamycin or Erythromycin is the drug of choice,  notify the laboratory within 7 days to request susceptibility testing.   08/02/2024 (A)  Final    Light growth (2+) Streptococcus agalactiae (Group B)     Comment:       This organism is considered to be universally susceptible to penicillin.  No further antibiotic testing will be performed. If Clindamycin or Erythromycin is the drug of choice, notify the laboratory within 7 days to request susceptibility testing.       Radiology Data:   Imaging Results (Last 24 Hours)       ** No results found for the last 24 hours. **            I have reviewed the patient's current medications.     Assessment/Plan   Assessment  Active Hospital Problems    Diagnosis     **DKA, type 2, not at goal     E. coli UTI, ESBL     Atrial fibrillation     Chronic heart failure with preserved ejection fraction (HFpEF)     Chronic diastolic heart failure     GERD without esophagitis     Diabetic ulcer of left foot associated with type 2 diabetes mellitus        Treatment Plan  Discussed with the patient the need for continued care and treatment of the left foot wound, again today.  Patient is unable to take care of himself effectively at home.  I have recommended to the patient again skilled nursing until such a time that the wound is healed.  We discussed that if he does not he will most likely lose the foot secondary to the wound with worsening of wound and infections.   Awaiting placement     Medical Decision Making  Number and Complexity of problems:   3 acute, high complexity problems  4+ chronic, moderate complexity problems     Differential Diagnosis: None     Conditions and Status        Condition is improving.     MDM Data  External documents reviewed: Care Everywhere  Cardiac tracing (EKG, telemetry) interpretation: See HPI  Radiology interpretation: See HPI  Labs reviewed: See HPI  Any tests that were considered but not ordered: None     Decision rules/scores evaluated (example [id number], [place], etc): None     Discussed with:  The patient     Care Planning  Shared decision making: Patient  Code status and discussions: Full code     Disposition  Social Determinants of Health that impact treatment or disposition: none     I expect the patient to be discharged to SNF  in 1-2 days.    Electronically signed by Alejandrina Barnett DO, 08/08/24, 11:02 CDT.

## 2024-08-08 NOTE — THERAPY TREATMENT NOTE
Patient Name: Erick Luong  : 1955    MRN: [id number]                              Today's Date: 2024       Admit Date: 2024    Visit Dx:     ICD-10-CM ICD-9-CM   1. Diabetic foot infection  E11.628 250.80    L08.9 686.9   2. Osteomyelitis of foot, unspecified laterality, unspecified type  M86.9 730.27   3. Diabetic ketoacidosis without coma associated with type 2 diabetes mellitus  E11.10 250.12   4. Elevated troponin  R79.89 790.6   5. Atrial fibrillation with RVR  I48.91 427.31   6. Confusion  R41.0 298.9   7. Impaired mobility [Z74.09]  Z74.09 799.89     Patient Active Problem List   Diagnosis    Obesity, unspecified obesity severity, unspecified obesity type    Essential hypertension    Type 2 diabetes mellitus with hyperglycemia, with long-term current use of insulin    Nonsmoker    Anemia due to chronic kidney disease    Class 3 severe obesity due to excess calories with body mass index (BMI) of 40.0 to 44.9 in adult    Anasarca    Sleep apnea with use of continuous positive airway pressure (CPAP)    Medically noncompliant    Diabetic ulcer of left foot associated with type 2 diabetes mellitus    Diabetic polyneuropathy associated with type 2 diabetes mellitus    Spinal stenosis in cervical region    Degeneration of cervical intervertebral disc    Cervical radiculopathy    Degeneration of lumbar or lumbosacral intervertebral disc    Cervical myelopathy    Bilateral carpal tunnel syndrome    CAD (coronary artery disease)    GERD without esophagitis    BPH without obstruction/lower urinary tract symptoms    Stage 3b chronic kidney disease    Chronic diastolic heart failure    Type 2 myocardial infarction due to heart failure    Left carpal tunnel syndrome    Syncope and collapse, recurrent episodes    Poorly-controlled hypertension    Rhinovirus    Peripheral vascular disease    Chronic kidney disease (CKD), stage IV (severe)    Diabetic foot infection    Sepsis    Epigastric pain    Chronic  heart failure with preserved ejection fraction (HFpEF)    Sepsis due to methicillin resistant Staphylococcus aureus (MRSA) with encephalopathy without septic shock    Slow transit constipation    CHF exacerbation    CHF (congestive heart failure), NYHA class I, acute on chronic, combined    Rectal bleeding    Acute on chronic heart failure with preserved ejection fraction (HFpEF)    DKA, type 2, not at goal    Atrial fibrillation    E. coli UTI, ESBL     Past Medical History:   Diagnosis Date    Arthritis     Autonomic disease     CAD (coronary artery disease) 02/06/2017    Cervical radiculopathy 09/16/2021    Chronic constipation with acute exaccerbation 05/10/2021    Coronary artery disease     Degeneration of cervical intervertebral disc 08/11/2021    Diabetes mellitus     Diabetic foot ulcer 08/31/2020    Diabetic polyneuropathy associated with type 2 diabetes mellitus 01/18/2021    Elevated cholesterol     Gastroesophageal reflux disease 05/13/2019    Headache     HTN (hypertension), benign 05/03/2017    Hyperlipidemia     Hypertension     Mixed hyperlipidemia 02/07/2017    Multiple lung nodules 01/26/2020    5mm, 9 mm RLL identified 1/2020, not present 10/2019.    Myocardial infarction     Osteomyelitis 01/22/2020    Osteomyelitis of fifth toe of right foot 10/07/2019    Pancreatitis     Persistent insomnia 01/20/2020    Renal disorder     Sleep apnea 02/06/2017    Sleep apnea with use of continuous positive airway pressure (CPAP)     NON-COMPLIANT    Slow transit constipation 01/16/2019    Spinal stenosis in cervical region 09/16/2021    Vitamin D deficiency 03/02/2021     Past Surgical History:   Procedure Laterality Date    ABDOMINAL SURGERY      AMPUTATION FOOT / TOE Left 10/2021    5th digit     ANTERIOR CERVICAL DISCECTOMY W/ FUSION N/A 08/05/2022    Procedure: CERVICAL DISCECTOMY ANTERIOR WITH FUSION C5-6 with possible plating of C5-7 with neuromonitoring and 1 c-arm;  Surgeon: Karel Soliz MD;   Location: [facility] OR;  Service: Neurosurgery;  Laterality: N/A;    APPENDECTOMY      BACK SURGERY      CARDIAC CATHETERIZATION Left 02/08/2021    Procedure: Left Heart Cath w poss intervention left anatomical snuff box acess;  Surgeon: Omkar Charles DO;  Location: [facility] CATH INVASIVE LOCATION;  Service: Cardiology;  Laterality: Left;    CARDIAC SURGERY      CATARACT EXTRACTION      CERVICAL SPINE SURGERY      COLONOSCOPY N/A 01/31/2017    Normal exam repeat in 5 years    COLONOSCOPY N/A 02/11/2019    Mild acute inflammation    COLONOSCOPY N/A 04/07/2024    2 areas at 10 and 20 cm with friability ulceration 2 clips placed at 20 cm and 4 clips at 10 cm poor prep normal mucosa,mild eroisions and ulcerations in visible vessels    COLONOSCOPY N/A 7/1/2024    Procedure: COLONOSCOPY WITH ANESTHESIA;  Surgeon: Arsalan Lorenzo DO;  Location: [facility] ENDOSCOPY;  Service: Gastroenterology;  Laterality: N/A;  pre op constipation/diarrhea  post poor prep  pcp Del Shetty    COLONOSCOPY N/A 7/2/2024    Procedure: COLONOSCOPY WITH ANESTHESIA;  Surgeon: Agapito Christopher MD;  Location: [facility] ENDOSCOPY;  Service: Gastroenterology;  Laterality: N/A;  pre rectal bleeding  post poor prep  pcp Del Shetty MD    COLONOSCOPY W/ POLYPECTOMY  03/04/2014    Hyperplastic polyp    CORONARY ARTERY BYPASS GRAFT  10/2015    ENDOSCOPY  04/13/2011    Gastritis with hemorrhage    ENDOSCOPY N/A 05/05/2017    Normal exam    ENDOSCOPY N/A 02/11/2019    Gastritis    ENDOSCOPY N/A 09/01/2020    Non-erosive gastritis with hemorrhage    ENDOSCOPY N/A 02/10/2021    Esophagitis    ENDOSCOPY N/A 04/11/2024    There were esophageal mucosal changes suspicious for short-segment Low's esophagus present in the distal esophagus. The maximum longitudinal extent of these mucosal changes was 2 cm in length. Mucosa was biopsied with a cold forceps for histologyDistal esophagus, biopsies: Mild chronic active esophagogastritis. No evidence of  intestinal metaplasia, dysplasia. Antrum, bx, Mild chronic gastritis    FOOT SURGERY Left     INCISION AND DRAINAGE OF WOUND Left 09/2007    spider bite      General Information       Row Name 08/08/24 1318          OT Time and Intention    Document Type therapy note (daily note)  -MM     Mode of Treatment occupational therapy  -MM       Row Name 08/08/24 1318          General Information    Patient Profile Reviewed yes  -MM     Existing Precautions/Restrictions fall;other (see comments)  Left foot wound, surgical shoe  -MM     Barriers to Rehab medically complex;physical barrier  -MM       Row Name 08/08/24 1318          Safety Issues, Functional Mobility    Impairments Affecting Function (Mobility) balance;endurance/activity tolerance;pain;strength;shortness of breath;sensation/sensory awareness  -MM               User Key  (r) = Recorded By, (t) = Taken By, (c) = Cosigned By      Initials Name Provider Type    MM Thien Conner, OTR/L Occupational Therapist                     Mobility/ADL's    No documentation.                  Obj/Interventions       Row Name 08/08/24 1318          Shoulder (Therapeutic Exercise)    Shoulder (Therapeutic Exercise) AROM (active range of motion)  -MM     Shoulder AROM (Therapeutic Exercise) bilateral;flexion;extension;aBduction;aDduction;external rotation;internal rotation;horizontal aBduction/aDduction;scapular elevation;scapular protraction;scapular retraction;20 repititions  -MM       Row Name 08/08/24 1318          Elbow/Forearm (Therapeutic Exercise)    Elbow/Forearm (Therapeutic Exercise) AROM (active range of motion)  -MM     Elbow/Forearm AROM (Therapeutic Exercise) bilateral;flexion;extension;supination;pronation;20 repititions  -MM       Row Name 08/08/24 1318          Wrist (Therapeutic Exercise)    Wrist (Therapeutic Exercise) AROM (active range of motion)  -MM     Wrist AROM (Therapeutic Exercise) bilateral;flexion;extension;ulnar deviation;radial deviation;20  "repititions  -MM       Row Name 08/08/24 1318          Hand (Therapeutic Exercise)    Hand (Therapeutic Exercise) AROM (active range of motion)  -MM     Hand AROM/AAROM (Therapeutic Exercise) bilateral;AROM (active range of motion);finger flexion;finger extension;20 repititions  -MM       Row Name 08/08/24 1318          Motor Skills    Therapeutic Exercise shoulder;elbow/forearm;wrist;hand  -MM               User Key  (r) = Recorded By, (t) = Taken By, (c) = Cosigned By      Initials Name Provider Type    MM Thien Conner, OTR/L Occupational Therapist                   Goals/Plan    No documentation.                  Clinical Impression       Row Name 08/08/24 1318          Pain Assessment    Pretreatment Pain Rating 8/10  -MM     Posttreatment Pain Rating 7/10  -MM     Pain Location - Side/Orientation Left  -MM     Pain Location - foot  -MM     Pain Intervention(s) Medication (See MAR);Repositioned;Ambulation/increased activity  -MM       Row Name 08/08/24 1318          Plan of Care Review    Plan of Care Reviewed With patient  -MM     Progress no change  -MM     Outcome Evaluation OT tx completed. Pt was asleep but easy to awaken, but remains drowsy. Pt agreeable to bed level activity as he reports he just finished working with PT. Pt reports pain \"7-8\"/10 in L foot. Pt completes BUE AROM HEP in all planes. 20 reps x1 set. 1lb dowel bar utilized as able. Pt reports increased difficulty with left shoulder flexion. Continue OT POC.  -MM       Row Name 08/08/24 1318          Therapy Plan Review/Discharge Plan (OT)    Anticipated Discharge Disposition (OT) skilled nursing facility  -MM       Row Name 08/08/24 1318          Positioning and Restraints    Pre-Treatment Position in bed  -MM     Post Treatment Position bed  -MM     In Bed side lying left;fowlers;call light within reach;encouraged to call for assist;side rails up x2  -MM               User Key  (r) = Recorded By, (t) = Taken By, (c) = Cosigned By      " Initials Name Provider Type    Thien Khan, OTR/L Occupational Therapist                   Outcome Measures       Row Name 08/08/24 1318          How much help from another is currently needed...    Putting on and taking off regular lower body clothing? 1  -MM     Bathing (including washing, rinsing, and drying) 2  -MM     Toileting (which includes using toilet bed pan or urinal) 2  -MM     Putting on and taking off regular upper body clothing 3  -MM     Taking care of personal grooming (such as brushing teeth) 3  -MM     Eating meals 4  -MM     AM-PAC 6 Clicks Score (OT) 15  -MM       Row Name 08/08/24 0824          How much help from another person do you currently need...    Turning from your back to your side while in flat bed without using bedrails? 4  -PB     Moving from lying on back to sitting on the side of a flat bed without bedrails? 3  -PB     Moving to and from a bed to a chair (including a wheelchair)? 3  -PB     Standing up from a chair using your arms (e.g., wheelchair, bedside chair)? 3  -PB     Climbing 3-5 steps with a railing? 2  -PB     To walk in hospital room? 3  -PB     AM-PAC 6 Clicks Score (PT) 18  -PB     Highest Level of Mobility Goal 6 --> Walk 10 steps or more  -PB       Row Name 08/08/24 1318          Functional Assessment    Outcome Measure Options AM-PAC 6 Clicks Daily Activity (OT)  -MM               User Key  (r) = Recorded By, (t) = Taken By, (c) = Cosigned By      Initials Name Provider Type    Vicki Cherry RN Registered Nurse    Thien Khan, OTR/L Occupational Therapist                    Occupational Therapy Education       Title: PT OT SLP Therapies (In Progress)       Topic: Occupational Therapy (In Progress)       Point: ADL training (Done)       Description:   Instruct learner(s) on proper safety adaptation and remediation techniques during self care or transfers.   Instruct in proper use of assistive devices.                  Learning Progress  "Summary             Patient Acceptance, E, VU,NR by  at 8/4/2024 1426                         Point: Home exercise program (In Progress)       Description:   Instruct learner(s) on appropriate technique for monitoring, assisting and/or progressing therapeutic exercises/activities.                  Learning Progress Summary             Patient Acceptance, E, NR by  at 8/8/2024 1335                         Point: Precautions (Done)       Description:   Instruct learner(s) on prescribed precautions during self-care and functional transfers.                  Learning Progress Summary             Patient Acceptance, E, VU,NR by  at 8/4/2024 1426                         Point: Body mechanics (Done)       Description:   Instruct learner(s) on proper positioning and spine alignment during self-care, functional mobility activities and/or exercises.                  Learning Progress Summary             Patient Acceptance, E, VU,NR by  at 8/4/2024 1426                                         User Key       Initials Effective Dates Name Provider Type Discipline     07/11/23 -  Thien Conner, OTR/L Occupational Therapist OT     10/13/23 -  Matilda Bryant OTR/L Occupational Therapist OT                  OT Recommendation and Plan     Plan of Care Review  Plan of Care Reviewed With: patient  Progress: no change  Outcome Evaluation: OT tx completed. Pt was asleep but easy to awaken, but remains drowsy. Pt agreeable to bed level activity as he reports he just finished working with PT. Pt reports pain \"7-8\"/10 in L foot. Pt completes BUE AROM HEP in all planes. 20 reps x1 set. 1lb dowel bar utilized as able. Pt reports increased difficulty with left shoulder flexion. Continue OT POC.     Time Calculation:         Time Calculation- OT       Row Name 08/08/24 1408 08/08/24 1318          Time Calculation- OT    OT Start Time -- 1318  -MM     OT Stop Time -- 1342  -MM     OT Time Calculation (min) -- 24 min  -MM     " Total Timed Code Minutes- OT -- 24 minute(s)  -MM     OT Received On -- 08/08/24  -MM        Timed Charges    68026 - OT Therapeutic Exercise Minutes -- 24  -MM     24956 - Gait Training Minutes  13  -LY --        Total Minutes    Timed Charges Total Minutes 13  -LY 24  -MM      Total Minutes 13  -LY 24  -MM               User Key  (r) = Recorded By, (t) = Taken By, (c) = Cosigned By      Initials Name Provider Type    MM Thien Conner, OTR/L Occupational Therapist    LY Alesha Dominguez, PTA Physical Therapist Assistant                  Therapy Charges for Today       Code Description Service Date Service Provider Modifiers Qty    73076571902 HC OT THER PROC EA 15 MIN 8/8/2024 Thien Conner, OTR/L GO 2                 ALEXANDER Noyola/EDVIN  8/8/2024

## 2024-08-08 NOTE — PLAN OF CARE
Goal Outcome Evaluation:  Plan of Care Reviewed With: patient        Progress: no change  Outcome Evaluation: VSS.  this shift, no SSI given. Safety maintained. Contact isolation continued

## 2024-08-09 LAB
ANION GAP SERPL CALCULATED.3IONS-SCNC: 9 MMOL/L (ref 5–15)
BUN SERPL-MCNC: 15 MG/DL (ref 8–23)
BUN/CREAT SERPL: 11.2 (ref 7–25)
CALCIUM SPEC-SCNC: 8.4 MG/DL (ref 8.6–10.5)
CHLORIDE SERPL-SCNC: 110 MMOL/L (ref 98–107)
CO2 SERPL-SCNC: 22 MMOL/L (ref 22–29)
CREAT SERPL-MCNC: 1.34 MG/DL (ref 0.76–1.27)
DEPRECATED RDW RBC AUTO: 47.7 FL (ref 37–54)
EGFRCR SERPLBLD CKD-EPI 2021: 57.7 ML/MIN/1.73
ERYTHROCYTE [DISTWIDTH] IN BLOOD BY AUTOMATED COUNT: 15.6 % (ref 12.3–15.4)
GLUCOSE BLDC GLUCOMTR-MCNC: 106 MG/DL (ref 70–130)
GLUCOSE BLDC GLUCOMTR-MCNC: 144 MG/DL (ref 70–130)
GLUCOSE BLDC GLUCOMTR-MCNC: 207 MG/DL (ref 70–130)
GLUCOSE BLDC GLUCOMTR-MCNC: 250 MG/DL (ref 70–130)
GLUCOSE SERPL-MCNC: 108 MG/DL (ref 65–99)
HCT VFR BLD AUTO: 31.6 % (ref 37.5–51)
HGB BLD-MCNC: 9.9 G/DL (ref 13–17.7)
MCH RBC QN AUTO: 27.2 PG (ref 26.6–33)
MCHC RBC AUTO-ENTMCNC: 31.3 G/DL (ref 31.5–35.7)
MCV RBC AUTO: 86.8 FL (ref 79–97)
PLATELET # BLD AUTO: 247 10*3/MM3 (ref 140–450)
PMV BLD AUTO: 12 FL (ref 6–12)
POTASSIUM SERPL-SCNC: 4.1 MMOL/L (ref 3.5–5.2)
QT INTERVAL: 312 MS
QTC INTERVAL: 455 MS
RBC # BLD AUTO: 3.64 10*6/MM3 (ref 4.14–5.8)
SODIUM SERPL-SCNC: 141 MMOL/L (ref 136–145)
WBC NRBC COR # BLD AUTO: 7.27 10*3/MM3 (ref 3.4–10.8)

## 2024-08-09 PROCEDURE — 99231 SBSQ HOSP IP/OBS SF/LOW 25: CPT | Performed by: NURSE PRACTITIONER

## 2024-08-09 PROCEDURE — 97110 THERAPEUTIC EXERCISES: CPT | Performed by: OCCUPATIONAL THERAPIST

## 2024-08-09 PROCEDURE — 25010000002 ENOXAPARIN PER 10 MG: Performed by: INTERNAL MEDICINE

## 2024-08-09 PROCEDURE — 85027 COMPLETE CBC AUTOMATED: CPT | Performed by: FAMILY MEDICINE

## 2024-08-09 PROCEDURE — 97530 THERAPEUTIC ACTIVITIES: CPT | Performed by: OCCUPATIONAL THERAPIST

## 2024-08-09 PROCEDURE — 63710000001 INSULIN REGULAR HUMAN PER 5 UNITS: Performed by: INTERNAL MEDICINE

## 2024-08-09 PROCEDURE — 99231 SBSQ HOSP IP/OBS SF/LOW 25: CPT | Performed by: INTERNAL MEDICINE

## 2024-08-09 PROCEDURE — 80048 BASIC METABOLIC PNL TOTAL CA: CPT | Performed by: INTERNAL MEDICINE

## 2024-08-09 PROCEDURE — 82948 REAGENT STRIP/BLOOD GLUCOSE: CPT

## 2024-08-09 PROCEDURE — 63710000001 INSULIN GLARGINE PER 5 UNITS: Performed by: INTERNAL MEDICINE

## 2024-08-09 PROCEDURE — 97116 GAIT TRAINING THERAPY: CPT

## 2024-08-09 RX ADMIN — ENOXAPARIN SODIUM 135 MG: 150 INJECTION SUBCUTANEOUS at 09:04

## 2024-08-09 RX ADMIN — PREGABALIN 50 MG: 25 CAPSULE ORAL at 09:04

## 2024-08-09 RX ADMIN — OXYCODONE HYDROCHLORIDE 10 MG: 5 TABLET ORAL at 09:04

## 2024-08-09 RX ADMIN — SODIUM BICARBONATE 650 MG: 650 TABLET ORAL at 17:36

## 2024-08-09 RX ADMIN — FAMOTIDINE 20 MG: 20 TABLET ORAL at 09:03

## 2024-08-09 RX ADMIN — OXYCODONE HYDROCHLORIDE 10 MG: 5 TABLET ORAL at 21:17

## 2024-08-09 RX ADMIN — INSULIN GLARGINE 10 UNITS: 100 INJECTION, SOLUTION SUBCUTANEOUS at 09:04

## 2024-08-09 RX ADMIN — DONEPEZIL HYDROCHLORIDE 10 MG: 10 TABLET, FILM COATED ORAL at 09:03

## 2024-08-09 RX ADMIN — SODIUM BICARBONATE 650 MG: 650 TABLET ORAL at 21:17

## 2024-08-09 RX ADMIN — Medication 10 ML: at 09:05

## 2024-08-09 RX ADMIN — ENOXAPARIN SODIUM 135 MG: 150 INJECTION SUBCUTANEOUS at 21:18

## 2024-08-09 RX ADMIN — POLYETHYLENE GLYCOL 3350 17 G: 17 POWDER, FOR SOLUTION ORAL at 09:04

## 2024-08-09 RX ADMIN — PANTOPRAZOLE SODIUM 40 MG: 40 TABLET, DELAYED RELEASE ORAL at 21:18

## 2024-08-09 RX ADMIN — SODIUM BICARBONATE 650 MG: 650 TABLET ORAL at 09:03

## 2024-08-09 RX ADMIN — ROSUVASTATIN CALCIUM 10 MG: 10 TABLET, FILM COATED ORAL at 21:17

## 2024-08-09 RX ADMIN — INSULIN HUMAN 5 UNITS: 100 INJECTION, SOLUTION PARENTERAL at 17:39

## 2024-08-09 RX ADMIN — PREGABALIN 100 MG: 100 CAPSULE ORAL at 21:17

## 2024-08-09 RX ADMIN — OXYCODONE HYDROCHLORIDE AND ACETAMINOPHEN 1000 MG: 500 TABLET ORAL at 09:03

## 2024-08-09 RX ADMIN — INSULIN HUMAN 8 UNITS: 100 INJECTION, SOLUTION PARENTERAL at 21:18

## 2024-08-09 RX ADMIN — PANTOPRAZOLE SODIUM 40 MG: 40 TABLET, DELAYED RELEASE ORAL at 09:04

## 2024-08-09 RX ADMIN — DULOXETINE HYDROCHLORIDE 60 MG: 30 CAPSULE, DELAYED RELEASE ORAL at 09:03

## 2024-08-09 RX ADMIN — DOCUSATE SODIUM 50 MG AND SENNOSIDES 8.6 MG 2 TABLET: 8.6; 5 TABLET, FILM COATED ORAL at 09:03

## 2024-08-09 RX ADMIN — FAMOTIDINE 20 MG: 20 TABLET ORAL at 21:17

## 2024-08-09 RX ADMIN — Medication 2.5 MG: at 21:18

## 2024-08-09 NOTE — PAYOR COMM NOTE
"Erick Luong (69 y.o. Male)   [id number]   CONT CLINICAL   Please review clinical for Additional Days      [facility] phone     fax            Date of Birth   1955    Social Security Number       Address   [address] [place] [address]    Home Phone       MRN   [id number]       Protestant   [facility]    Marital Status                               Admission Date   7/31/24    Admission Type   Emergency    Admitting Provider   Alejandrina Barnett DO    Attending Provider   Alejandrina Barnett DO    Department, Room/Bed   [facility] 3C, 365/1       Discharge Date       Discharge Disposition       Discharge Destination                                 Attending Provider: Alejandrina Barnett DO    Allergies: Cefepime, Bactrim [Sulfamethoxazole-trimethoprim], Vancomycin, Zolpidem, Metronidazole    Isolation: Contact   Infection: MRSA (05/19/19), COVID (History) (08/08/22), ESBL E coli (06/30/24)   Code Status: CPR    Ht: 182.9 cm (72\")   Wt: 134 kg (295 lb 3.2 oz)    Admission Cmt: None   Principal Problem: DKA, type 2, not at goal [E11.10]                   Active Insurance as of 7/31/2024       Primary Coverage       Payor Plan Insurance Group Employer/Plan Group    [employer]OSS [employer] [facility] EMPLOYEE [id number]       Payor Plan Address Payor Plan Phone Number Payor Plan Fax Number Effective Dates    [address] [phone]  1/1/2022 - None Entered    [facility] [id number]         Subscriber Name Subscriber Birth Date Member ID       ZAINAB LUONG 11/27/1969 [id number]               Secondary Coverage       Payor Plan Insurance Group Employer/Plan Group    MEDICARE MEDICARE A & B        Payor Plan Address Payor Plan Phone Number Payor Plan Fax Number Effective Dates    [address] [phone]  7/1/2013 - None Entered    [facility] [id number]         Subscriber Name Subscriber Birth Date Member ID       ERICK LUONG " 1955 [id number]                     Emergency Contacts        (Rel.) Home Phone Work Phone Mobile Phone    Joan Luong (Spouse) [phone]              Vital Signs (last day)       Date/Time Temp Temp src Pulse Resp BP Patient Position SpO2    08/09/24 0433 98 (36.7) Oral 70 16 140/64 Lying 94    08/08/24 2020 98.3 (36.8) Oral 77 16 139/75 Lying 96    08/08/24 1547 97.3 (36.3) Oral 80 16 150/76 Lying 95    08/08/24 0803 96.8 (36) Axillary 69 16 131/71 Lying 95    08/08/24 0454 98.2 (36.8) Oral 72 16 146/71 Lying 96          Current Facility-Administered Medications   Medication Dose Route Frequency Provider Last Rate Last Admin    ascorbic acid (VITAMIN C) tablet 1,000 mg  1,000 mg Oral Daily Reuben Garza APRN   1,000 mg at 08/08/24 0823    sennosides-docusate (PERICOLACE) 8.6-50 MG per tablet 2 tablet  2 tablet Oral BID Lamine Figueroa APRN   2 tablet at 08/07/24 2229    And    polyethylene glycol (MIRALAX) packet 17 g  17 g Oral Daily PRN Lamine Figueroa APRN        And    bisacodyl (DULCOLAX) EC tablet 5 mg  5 mg Oral Daily PRN Lamine Figueroa APRN        And    bisacodyl (DULCOLAX) suppository 10 mg  10 mg Rectal Daily PRN Lamine Fgiueroa APRN   10 mg at 08/02/24 0941    Calcium Replacement - Follow Nurse / BPA Driven Protocol   Does not apply PRN Abebe Garzon DO        dextrose (D50W) (25 g/50 mL) IV injection 10-50 mL  10-50 mL Intravenous Q15 Min PRN Abebe Garzon,         dextrose (D50W) (25 g/50 mL) IV injection 25 g  25 g Intravenous Q15 Min PRN Lamine Figueroa APRN        dextrose (GLUTOSE) oral gel 15 g  15 g Oral Q15 Min PRN Lamine Figueroa APRN        donepezil (ARICEPT) tablet 10 mg  10 mg Oral Daily Reuben Garza APRN   10 mg at 08/08/24 0824    DULoxetine (CYMBALTA) DR capsule 60 mg  60 mg Oral Daily Reuben Garza APRN   60 mg at 08/08/24 0824    Enoxaparin Sodium (LOVENOX) syringe 135 mg  1 mg/kg Subcutaneous Q12H  Abebe Garzon DO   135 mg at 08/08/24 2025    famotidine (PEPCID) tablet 20 mg  20 mg Oral Q12H Reuben Garza APRN   20 mg at 08/08/24 2022    glucagon (GLUCAGEN) injection 1 mg  1 mg Intramuscular Q15 Min PRN Lamine Figueroa APRN        insulin glargine (LANTUS, SEMGLEE) injection 10 Units  10 Units Subcutaneous Daily Keren Jamison MD   10 Units at 08/08/24 0823    insulin regular (humuLIN R,novoLIN R) injection 3-14 Units  3-14 Units Subcutaneous 4x Daily AC & at Bedtime Keren Jamison MD   3 Units at 08/08/24 1804    Magnesium Standard Dose Replacement - Follow Nurse / BPA Driven Protocol   Does not apply PRN Abebe Garzon DO        melatonin tablet 2.5 mg  2.5 mg Oral Nightly Reuben Garza APRN   2.5 mg at 08/08/24 2022    nitroglycerin (NITROSTAT) SL tablet 0.4 mg  0.4 mg Sublingual Q5 Min PRN Lamine Figueroa APRN        ondansetron (ZOFRAN) injection 4 mg  4 mg Intravenous Q6H PRN Payam Keyes DO   4 mg at 08/08/24 0900    oxyCODONE (ROXICODONE) immediate release tablet 10 mg  10 mg Oral BID Lamine Figueroa APRN   10 mg at 08/08/24 2022    pantoprazole (PROTONIX) EC tablet 40 mg  40 mg Oral Q12H Reuben Garza APRN   40 mg at 08/08/24 2022    Pharmacy to Dose enoxaparin (LOVENOX)   Does not apply Continuous PRN Abebe Garzon DO        Phosphorus Replacement - Follow Nurse / BPA Driven Protocol   Does not apply PRN Abebe Garzon DO        polyethylene glycol (MIRALAX) packet 17 g  17 g Oral Daily Reuben Garza APRN   17 g at 08/08/24 0823    Potassium Replacement - Follow Nurse / BPA Driven Protocol   Does not apply PRN Abebe Garzon DO        pregabalin (LYRICA) capsule 100 mg  100 mg Oral Nightly Reuben Garza APRN   100 mg at 08/08/24 2022    pregabalin (LYRICA) capsule 50 mg  50 mg Oral Daily Reuben Garza APRN   50 mg at 08/08/24 0823    rosuvastatin (CRESTOR) tablet 10 mg  10 mg Oral Nightly Reuben Garza APRN   10 mg at 08/08/24  2022    sodium bicarbonate tablet 650 mg  650 mg Oral TID Brian Lomas MD   650 mg at 08/08/24 2022    sodium chloride 0.9 % flush 10 mL  10 mL Intravenous Q12H Abebe Garzon DO   10 mL at 08/08/24 2023    sodium chloride 0.9 % flush 10 mL  10 mL Intravenous PRN Abebe Garzon DO        sodium chloride 0.9 % flush 10 mL  10 mL Intravenous Q12H Lamine Figueroa APRN   10 mL at 08/08/24 2023    sodium chloride 0.9 % flush 10 mL  10 mL Intravenous PRN Lamine Figueroa APRN        sodium chloride 0.9 % infusion 40 mL  40 mL Intravenous PRN Abebe Garzon DO        sodium chloride 0.9 % infusion 40 mL  40 mL Intravenous PRN Lamine Figueroa APRN        sodium hypochlorite (DAKIN'S 1/4 STRENGTH) 0.125 % topical solution 0.125% solution   Topical Q12H Aby High APRN   Given at 08/06/24 1000        Physician Progress Notes (last 48 hours)        Tahira Kellogg APRN at 08/08/24 1646       Attestation signed by Asad Cerrato DPM at 08/08/24 1655    I have reviewed this documentation and agree.    Updated image was taken of wounds.  Clinical improvement noted.                       [facility] - PODIATRY    Today's Date: 08/08/24     Patient Name: Erick Luong  MRN: [id number]  CSN: [id number]  PCP: Del Shetty MD  Referring Provider: Abebe Garzon DO  Attending Provider: Alejandrina Barnett DO  Length of Stay: 8    SUBJECTIVE   Chief Complaint: Left foot wounds    HPI: Erick Luong, a 69 y.o.male, who is being followed by podiatry for left foot wounds.  Patient reports nursing has been changing his dressing regularly.  Relays that his pain is 8/10 currently to his left foot. States that he will likely be discharged in the next few days to a skilled nursing facility. Denies significant overnight events.    Past Medical History:   Diagnosis Date    Arthritis     Autonomic disease     CAD (coronary artery disease) 02/06/2017    Cervical radiculopathy 09/16/2021     Chronic constipation with acute exaccerbation 05/10/2021    Coronary artery disease     Degeneration of cervical intervertebral disc 08/11/2021    Diabetes mellitus     Diabetic foot ulcer 08/31/2020    Diabetic polyneuropathy associated with type 2 diabetes mellitus 01/18/2021    Elevated cholesterol     Gastroesophageal reflux disease 05/13/2019    Headache     HTN (hypertension), benign 05/03/2017    Hyperlipidemia     Hypertension     Mixed hyperlipidemia 02/07/2017    Multiple lung nodules 01/26/2020    5mm, 9 mm RLL identified 1/2020, not present 10/2019.    Myocardial infarction     Osteomyelitis 01/22/2020    Osteomyelitis of fifth toe of right foot 10/07/2019    Pancreatitis     Persistent insomnia 01/20/2020    Renal disorder     Sleep apnea 02/06/2017    Sleep apnea with use of continuous positive airway pressure (CPAP)     NON-COMPLIANT    Slow transit constipation 01/16/2019    Spinal stenosis in cervical region 09/16/2021    Vitamin D deficiency 03/02/2021     Past Surgical History:   Procedure Laterality Date    ABDOMINAL SURGERY      AMPUTATION FOOT / TOE Left 10/2021    5th digit     ANTERIOR CERVICAL DISCECTOMY W/ FUSION N/A 08/05/2022    Procedure: CERVICAL DISCECTOMY ANTERIOR WITH FUSION C5-6 with possible plating of C5-7 with neuromonitoring and 1 c-arm;  Surgeon: Karel Soliz MD;  Location:  [facility] OR;  Service: Neurosurgery;  Laterality: N/A;    APPENDECTOMY      BACK SURGERY      CARDIAC CATHETERIZATION Left 02/08/2021    Procedure: Left Heart Cath w poss intervention left anatomical snuff box acess;  Surgeon: Omkar Charles DO;  Location:  [facility] CATH INVASIVE LOCATION;  Service: Cardiology;  Laterality: Left;    CARDIAC SURGERY      CATARACT EXTRACTION      CERVICAL SPINE SURGERY      COLONOSCOPY N/A 01/31/2017    Normal exam repeat in 5 years    COLONOSCOPY N/A 02/11/2019    Mild acute inflammation    COLONOSCOPY N/A 04/07/2024    2 areas at 10 and 20 cm with friability  ulceration 2 clips placed at 20 cm and 4 clips at 10 cm poor prep normal mucosa,mild eroisions and ulcerations in visible vessels    COLONOSCOPY N/A 2024    Procedure: COLONOSCOPY WITH ANESTHESIA;  Surgeon: Arsalan Lorenzo DO;  Location: [facility] ENDOSCOPY;  Service: Gastroenterology;  Laterality: N/A;  pre op constipation/diarrhea  post poor prep  pcp Del Shetty    COLONOSCOPY N/A 2024    Procedure: COLONOSCOPY WITH ANESTHESIA;  Surgeon: Agapito Christopher MD;  Location: [facility] ENDOSCOPY;  Service: Gastroenterology;  Laterality: N/A;  pre rectal bleeding  post poor prep  pcp Del Shetty MD    COLONOSCOPY W/ POLYPECTOMY  2014    Hyperplastic polyp    CORONARY ARTERY BYPASS GRAFT  10/2015    ENDOSCOPY  2011    Gastritis with hemorrhage    ENDOSCOPY N/A 2017    Normal exam    ENDOSCOPY N/A 2019    Gastritis    ENDOSCOPY N/A 2020    Non-erosive gastritis with hemorrhage    ENDOSCOPY N/A 02/10/2021    Esophagitis    ENDOSCOPY N/A 2024    There were esophageal mucosal changes suspicious for short-segment Low's esophagus present in the distal esophagus. The maximum longitudinal extent of these mucosal changes was 2 cm in length. Mucosa was biopsied with a cold forceps for histologyDistal esophagus, biopsies: Mild chronic active esophagogastritis. No evidence of intestinal metaplasia, dysplasia. Antrum, bx, Mild chronic gastritis    FOOT SURGERY Left     INCISION AND DRAINAGE OF WOUND Left 2007    spider bite     Family History   Problem Relation Age of Onset    Colon cancer Father     Heart disease Father     Colon cancer Sister     Colon polyps Sister     Alzheimer's disease Mother     Coronary artery disease Sister     Coronary artery disease Sister      Social History     Socioeconomic History    Marital status:    Tobacco Use    Smoking status: Former     Current packs/day: 0.00     Types: Cigarettes     Quit date:      Years since quittin.6  "   Smokeless tobacco: Never    Tobacco comments:     [place]   Vaping Use    Vaping status: Never Used   Substance and Sexual Activity    Alcohol use: No    Drug use: No    Sexual activity: Defer     Allergies   Allergen Reactions    Cefepime Hives and Anaphylaxis    Bactrim [Sulfamethoxazole-Trimethoprim] Other (See Comments)     \"RENAL FAILURE\"    Vancomycin Itching    Zolpidem Mental Status Change     \"makes him crazy\"    Metronidazole Rash     Current Facility-Administered Medications   Medication Dose Route Frequency Provider Last Rate Last Admin    ascorbic acid (VITAMIN C) tablet 1,000 mg  1,000 mg Oral Daily Reuben Garza APRN   1,000 mg at 08/08/24 0823    sennosides-docusate (PERICOLACE) 8.6-50 MG per tablet 2 tablet  2 tablet Oral BID Lamine Figueroa APRN   2 tablet at 08/07/24 2229    And    polyethylene glycol (MIRALAX) packet 17 g  17 g Oral Daily PRN Lamine Figueroa APRN        And    bisacodyl (DULCOLAX) EC tablet 5 mg  5 mg Oral Daily PRN Lamine Figueroa APRN        And    bisacodyl (DULCOLAX) suppository 10 mg  10 mg Rectal Daily PRN Lamine Figueroa APRN   10 mg at 08/02/24 0941    Calcium Replacement - Follow Nurse / BPA Driven Protocol   Does not apply PRN Abebe Garzon DO        dextrose (D50W) (25 g/50 mL) IV injection 10-50 mL  10-50 mL Intravenous Q15 Min PRN Abebe Garzon DO        dextrose (D50W) (25 g/50 mL) IV injection 25 g  25 g Intravenous Q15 Min PRN Lamine Figueroa APRN        dextrose (GLUTOSE) oral gel 15 g  15 g Oral Q15 Min PRN Lamine Figueroa APRN        donepezil (ARICEPT) tablet 10 mg  10 mg Oral Daily Reuben Garza APRN   10 mg at 08/08/24 0824    DULoxetine (CYMBALTA) DR capsule 60 mg  60 mg Oral Daily Reuben Garza APRN   60 mg at 08/08/24 0824    Enoxaparin Sodium (LOVENOX) syringe 135 mg  1 mg/kg Subcutaneous Q12H Abebe Garzon DO   135 mg at 08/08/24 0831    famotidine (PEPCID) tablet 20 mg  20 mg Oral Q12H Greg, " SUSAN Alexis   20 mg at 08/08/24 0824    glucagon (GLUCAGEN) injection 1 mg  1 mg Intramuscular Q15 Min PRN Lamine Figueroa APRN        insulin glargine (LANTUS, SEMGLEE) injection 10 Units  10 Units Subcutaneous Daily Keren Jamison MD   10 Units at 08/08/24 0823    insulin regular (humuLIN R,novoLIN R) injection 3-14 Units  3-14 Units Subcutaneous 4x Daily AC & at Bedtime Keren Jamison MD   3 Units at 08/03/24 2033    Magnesium Standard Dose Replacement - Follow Nurse / BPA Driven Protocol   Does not apply PRN Abebe Garzon DO        melatonin tablet 2.5 mg  2.5 mg Oral Nightly Reuben Garza APRN   2.5 mg at 08/07/24 2230    nitroglycerin (NITROSTAT) SL tablet 0.4 mg  0.4 mg Sublingual Q5 Min PRN Lamine Figueroa APRN        ondansetron (ZOFRAN) injection 4 mg  4 mg Intravenous Q6H PRN Payam Keyes DO   4 mg at 08/08/24 0900    oxyCODONE (ROXICODONE) immediate release tablet 10 mg  10 mg Oral BID Lamine Figueroa APRN   10 mg at 08/08/24 0824    pantoprazole (PROTONIX) EC tablet 40 mg  40 mg Oral Q12H Reuben Garza APRN   40 mg at 08/08/24 0824    Pharmacy to Dose enoxaparin (LOVENOX)   Does not apply Continuous PRN Abebe Garzon DO        Phosphorus Replacement - Follow Nurse / BPA Driven Protocol   Does not apply PRN Abebe Garzon DO        polyethylene glycol (MIRALAX) packet 17 g  17 g Oral Daily Reuben Garza APRN   17 g at 08/08/24 0823    Potassium Replacement - Follow Nurse / BPA Driven Protocol   Does not apply PRN Abebe Garzon DO        pregabalin (LYRICA) capsule 100 mg  100 mg Oral Nightly Reuben Garza APRN   100 mg at 08/07/24 2229    pregabalin (LYRICA) capsule 50 mg  50 mg Oral Daily Reuben Garza APRN   50 mg at 08/08/24 0823    rosuvastatin (CRESTOR) tablet 10 mg  10 mg Oral Nightly Reuben Garza APRN   10 mg at 08/07/24 2231    sodium bicarbonate tablet 650 mg  650 mg Oral TID Brian Lomas MD   650 mg at 08/08/24 0824    sodium  chloride 0.9 % flush 10 mL  10 mL Intravenous Q12H Abebe Garzon, DO   10 mL at 08/08/24 0825    sodium chloride 0.9 % flush 10 mL  10 mL Intravenous PRN Abebe Garzon, DO        sodium chloride 0.9 % flush 10 mL  10 mL Intravenous Q12H Lamine Figueroa, APRN   10 mL at 08/08/24 0823    sodium chloride 0.9 % flush 10 mL  10 mL Intravenous PRN Lamine Figueroa, APRN        sodium chloride 0.9 % infusion 40 mL  40 mL Intravenous PRN DuranAbebe marroquinell, DO        sodium chloride 0.9 % infusion 40 mL  40 mL Intravenous PRN Lamine Figueroa, APRN        sodium hypochlorite (DAKIN'S 1/4 STRENGTH) 0.125 % topical solution 0.125% solution   Topical Q12H Aby High, SUSAN   Given at 08/06/24 1000     Review of Systems   Constitutional:  Negative for activity change.   HENT:  Negative for congestion.    Respiratory:  Negative for apnea and shortness of breath.    Gastrointestinal:  Negative for abdominal pain.   Musculoskeletal:  Negative for arthralgias and back pain.        Foot pain   Skin:  Positive for wound.   Neurological:  Positive for numbness.   Psychiatric/Behavioral:  Positive for decreased concentration. Negative for agitation, behavioral problems and confusion.        OBJECTIVE     Vitals:    08/08/24 1547   BP: 150/76   Pulse: 80   Resp: 16   Temp: 97.3 °F (36.3 °C)   SpO2: 95%       PHYSICAL EXAM  GEN:   Pt presents in hospital bed. Accompanied by none.     Foot/Ankle Exam    GENERAL  Appearance:  chronically ill  Affect:  unfocused  Right shoe gear: sock  Left shoe gear: sock    VASCULAR     Right Foot Vascularity   Dorsalis pedis:  2+  Posterior tibial:  2+  Skin temperature:  warm  Edema grading:  Trace  CFT:  3  Pedal hair growth:  Absent     Left Foot Vascularity   Dorsalis pedis:  2+  Posterior tibial:  2+  Skin temperature:  warm  Edema grading:  Trace  CFT:  3  Pedal hair growth:  Absent     NEUROLOGIC     Right Foot Neurologic   Light touch sensation: diminished  Vibratory sensation:  diminished  Hot/Cold sensation: diminished     Left Foot Neurologic   Light touch sensation: diminished  Vibratory sensation: diminished  Hot/Cold sensation:  diminished    MUSCULOSKELETAL     Right Foot Musculoskeletal   Ecchymosis:  none  Tenderness:  none       Left Foot Musculoskeletal   Ecchymosis:  none  Tenderness:  lateral foot tenderness    MUSCLE STRENGTH     Right Foot Muscle Strength   Foot dorsiflexion:  5  Foot plantar flexion:  5  Foot inversion:  5  Foot eversion:  5     Left Foot Muscle Strength   Foot dorsiflexion:  4+  Foot plantar flexion:  4+  Foot inversion:  4+  Foot eversion:  4+    DERMATOLOGIC      Right Foot Dermatologic   Skin  Right foot skin is intact.      Left Foot Dermatologic   Skin  Positive for drainage, erythema and ulcer.      Left foot additional comments: Multiple areas of ulceration with serosanguineous drainage, erythema, and slight tenderness present. No malodor noticed.    See attached photos.               RADIOLOGY/NUCLEAR:  US Renal Bilateral    Result Date: 8/1/2024  Narrative: US RENAL BILATERAL- 8/1/2024 11:19 AM  HISTORY: Acute kidney injury; E11.628-Type 2 diabetes mellitus with other skin complications; L08.9-Local infection of the skin and subcutaneous tissue, unspecified; M86.9-Osteomyelitis, unspecified; E11.10-Type 2 diabetes mellitus with ketoacidosis without coma; R79.89-Other specified abnormal findings of blood chemistry; I48.91-Unspecified atrial fibrillation; R41.0-Disorientation, unspecified  COMPARISON: None available.   TECHNIQUE: Multiple longitudinal and transverse real-time sonographic images of the kidneys and urinary bladder are obtained. Report and images stored per institutional and state regulations.    FINDINGS:  Visualized proximal abdominal aorta and IVC are unremarkable.   RIGHT KIDNEY: Right kidney is 10.7 cm in length. Renal cortex is unremarkable. There is no hydronephrosis. There is an exophytic anechoic simple right renal cyst  measuring 4.9 cm..  LEFT KIDNEY: Left kidney is 11.3 cm in length. Renal cortex is unremarkable. There is no left hydronephrosis.  PELVIS: Urinary bladder is unremarkable.       Impression:  1. Simple right renal cyst. 2. Otherwise unremarkable kidneys and no hydronephrosis.    This report was signed and finalized on 8/1/2024 1:02 PM by Eran Christina.      CT Abdomen Pelvis With Contrast    Result Date: 8/1/2024  Narrative: EXAMINATION: CT ABDOMEN PELVIS W CONTRAST-   7/31/2024 10:41 PM  HISTORY: abdominal distention with pain; M86.9-Osteomyelitis, unspecified; E11.10-Type 2 diabetes mellitus with ketoacidosis without coma; R79.89-Other specified abnormal findings of blood chemistry; I48.91-Unspecified atrial fibrillation; R41.0-Disorientation, unspecified  In order to have a CT radiation dose as low as reasonably achievable Automated Exposure Control was utilized for adjustment of the mA and/or KV according to patient size.  Total DLP = 1841.72 mGy.cm  The CT scan of the abdomen and pelvis is performed after intravenous contrast enhancement.  The images are acquired in axial plane and subsequent reconstruction in coronal and sagittal planes.  Comparison is made with the previous study dated 6/27/2024.  The lung bases included in the study show a trace right and small left basal pleural effusion. There are mild atelectatic changes at bilateral bases left more than the right.  Limited visualized cardiomediastinal structures show atheromatous changes of the coronary arteries. There is moderate cardiomegaly.  The liver and spleen are normal.  The gallbladder is surgically absent.  Fatty infiltrated pancreas seen. No focal abnormality. No ductal dilatation. The adrenal glands are normal.  There is persistent bilateral significant perinephric fat infiltration and thickening of the pararenal fascia which is similar to the previous study. Bilateral nephrogram is normal and symmetrical. No calculi. No hydronephrosis.  There is a well-defined sharply marginated low density exophytic mass from the lower pole of the right kidney measuring 4.4 cm in diameter. CT density suggest a cyst. Limited visualized ureters are normal and nondilated. The urinary bladder is well distended. No intrinsic abnormality.  Prostate is not significantly enlarged.  There are small fat-containing inguinal hernias, right larger than the left.  There is subcutaneous fat infiltration of the entire abdominal wall extending into the lower extremity. This may represent a fluid overload?.  The stomach is decompressed with moderate wall thickening. No focal abnormality Alamast. Duodenum is normal. Small bowel is nondistended and nondilated. Appendix is surgically absent. There is significant large volume stool throughout the colon. No finding to suggest obstruction.  Atheromatous changes of the abdominal aorta and iliac arteries. No aneurysmal dilatation.  Moderately prominent nonspecific retroperitoneal para-aortic lymph nodes predominantly in the mid abdomen. A referenced left para iliac lymph node, image #57 in series 2 and image #40 and series 3, measures 1.6 cm in short axis. There is a large lymph node in the left lower anterior pelvis/external iliac group measuring 1.3 cm in short axis. There are moderately prominent inguinal lymph nodes. A left inguinal lymph node measures 2 cm in short axis.  Images reviewed in bone window show chronic degenerative changes of the lumbar spine. No acute bony abnormality.      Impression: 1. A significant perinephric fat stranding is similar to the previous study and is a nonspecific finding. Possibility for chronic inflammatory process/chronic pyelonephritis may not be excluded. Renal functions are normal and symmetrical. 2. Fatty infiltration of the pancreas. No mass. No ductal dilatation. 3. Nonspecific abdominal, pelvic and inguinal lymphadenopathy. The etiology and clinical significance is not certain. This appears  moderately more progressive since the previous study. 4. Diffuse subcutaneous fat infiltration of the abdominal wall extending into the lower extremity may represent fluid overload?. 5. A significant large volume of stool in the colon without evidence of obstruction. This may represent constipation.  The above study was initially reviewed and reported by [employer]. I do not find any discrepancies.           This report was signed and finalized on 8/1/2024 7:50 AM by Dr. Carlos Cutler MD.      CT Head Without Contrast    Result Date: 8/1/2024  Narrative: EXAMINATION: CT HEAD WO CONTRAST-   7/31/2024 10:41 PM  HISTORY: altered mental status; M86.9-Osteomyelitis, unspecified; E11.10-Type 2 diabetes mellitus with ketoacidosis without coma; R79.89-Other specified abnormal findings of blood chemistry; I48.91-Unspecified atrial fibrillation; R41.0-Disorientation, unspecified  In order to have a CT radiation dose as low as reasonably achievable Automated Exposure Control was utilized for adjustment of the mA and/or KV according to patient size.  Total DLP = 783.72 mGy.cm  The CT scan of the head is performed without intravenous contrast enhancement.  The images are acquired in axial plane and subsequent reconstruction with coronal and sagittal planes.  Comparison is made with the previous study dated 5/3/2024.  There is no evidence of a mass. There is no midline shift.  There is no evidence of intracranial hemorrhage or hematoma.  Moderately dilated ventricles, basal cisterns and the cortical sulci are similar to the previous study representing chronic volume loss.  Areas of chronic white matter ischemia bilaterally are noted. The gray-white matter differentiation is maintained.  Posterior fossa structures are normal.  The images reviewed in bone window show no acute displaced skull fracture. A subtle nondisplaced fracture or lesion may be obscured due to motion artifacts. Large mucous retention cyst is seen in the right  maxillary antrum. The remaining paranasal sinuses and mastoid air cells are clear.      Impression: 1. No acute intracranial abnormality. 2. Chronic ischemic and atrophic changes. 3. Chronic maxillary sinusitis.  The above study was initially reviewed and reported by [facility]. I do not find any discrepancies.             This report was signed and finalized on 8/1/2024 5:27 AM by Dr. Carlos Cutler MD.      XR Foot 3+ View Left    Result Date: 7/31/2024  Narrative: EXAMINATION:  XR FOOT 3+ VW LEFT-  7/31/2024 6:22 PM  HISTORY: Heel wound.  COMPARISON: 3/26/2024.  TECHNIQUE: 3 views were obtained.  FINDINGS: There is a deep ulcer on the sole of the foot posteriorly. The ulcer appears to be packed with some type of radiopaque material. On the lateral image, there may be a small area of bony erosion involving the calcaneus just posterior to the plantar calcaneal spur. A small area of osteomyelitis is not ruled out. There has been prior amputation of the fifth toe and distal fifth metatarsal. There may have been prior resection of the distal fourth metacarpal and a portion of the proximal phalanx of the fourth toe versus chronic erosive change. The appearance is stable. There is severe narrowing of the first MTP joint. There is narrowing of some of the interphalangeal joints. There is some spurring in the tarsal region and at the tarsal-metatarsal junction. There is soft tissue swelling of the foot diffusely. There is a small curvilinear foreign body in the soft tissues along the sole of the foot in the second toe region in the proximal phalanx area. There is another small linear foreign body in the soft tissues adjacent to the distal phalanx of the great toe.       Impression: 1. Deep ulcer on the sole of the foot posteriorly near the calcaneus. There is a questionable small area of bony erosion along the plantar surface of the calcaneus just proximal to the plantar calcaneal spur. Osteomyelitis cannot be ruled out.  The soft tissue ulcer is packed with some type of radiopaque material. 2. Linear foreign bodies projected over the soft tissues of the second toe and first toe. Artifacts are also included in the differential. 3. Other chronic changes, as discussed.   This report was signed and finalized on 7/31/2024 7:47 PM by Dr. Raz Mendes MD.      XR Chest 1 View    Result Date: 7/31/2024  Narrative: EXAMINATION:  XR CHEST 1 VW-  7/31/2024 6:22 PM  HISTORY: Altered mental status. Hypertension and diabetes.  COMPARISON: 6/28/2024.  TECHNIQUE: Single view AP image.  FINDINGS: There is hypoventilation with vascular crowding. There is mild bronchial wall thickening, stable. There is no dense infiltrate or effusion. Heart size is borderline. Prior heart bypass surgery. Prior cervical fusion. No definite acute bony abnormality.       Impression: 1. Hypoventilation with vascular crowding. 2. Mild bronchial wall thickening, stable.    This report was signed and finalized on 7/31/2024 7:41 PM by Dr. Raz Mendes MD.       LABORATORY/CULTURE RESULTS:  Results from last 7 days   Lab Units 08/08/24  0449 08/05/24  0444 08/04/24  0615   WBC 10*3/mm3 8.08 5.89 5.73   HEMOGLOBIN g/dL 9.6* 9.5* 9.5*   HEMATOCRIT % 30.5* 30.1* 30.5*   PLATELETS 10*3/mm3 242 210 201     Results from last 7 days   Lab Units 08/08/24  0449 08/07/24  0758 08/06/24  0508 08/05/24  0444 08/04/24  0615 08/03/24  0605   SODIUM mmol/L 141 144 143 144 145 143   POTASSIUM mmol/L 3.8 3.9 4.1 3.9 3.9 3.8   CHLORIDE mmol/L 109* 110* 109* 112* 111* 110*   CO2 mmol/L 21.0* 20.0* 22.0 21.0* 21.0* 19.0*   BUN mg/dL 15 15 18 20 24* 31*   CREATININE mg/dL 1.42* 1.50* 1.47* 1.47* 1.60* 1.82*   CALCIUM mg/dL 8.5* 8.6 8.4* 8.3* 8.5* 8.3*   BILIRUBIN mg/dL  --   --   --  0.3 0.3 0.3   ALK PHOS U/L  --   --   --  102 95 88   ALT (SGPT) U/L  --   --   --  9 9 7   AST (SGOT) U/L  --   --   --  16 15 12   GLUCOSE mg/dL 110* 97 101* 89 97 125*         Microbiology Results (last 10  days)       Procedure Component Value - Date/Time    Wound Culture - Drainage, Foot, Left [[id number]]  (Abnormal) Collected: 08/02/24 1338    Lab Status: Final result Specimen: Drainage from Foot, Left Updated: 08/04/24 0857     Wound Culture Light growth (2+) Streptococcus agalactiae (Group B)     Comment:   This organism is considered to be universally susceptible to penicillin.  No further antibiotic testing will be performed. If Clindamycin or Erythromycin is the drug of choice, notify the laboratory within 7 days to request susceptibility testing.        Gram Stain Rare (1+) WBCs seen      No organisms seen    Wound Culture - Wound, Foot, Left [[id number]]  (Abnormal) Collected: 08/02/24 1032    Lab Status: Final result Specimen: Wound from Foot, Left Updated: 08/04/24 0857     Wound Culture Light growth (2+) Streptococcus agalactiae (Group B)     Comment:   This organism is considered to be universally susceptible to penicillin.  No further antibiotic testing will be performed. If Clindamycin or Erythromycin is the drug of choice, notify the laboratory within 7 days to request susceptibility testing.        Gram Stain Few (2+) WBCs seen      Rare (1+) Gram positive cocci    Eosinophil Smear - Urine, Urine, Clean Catch [[id number]]  (Normal) Collected: 08/01/24 1547    Lab Status: Final result Specimen: Urine, Clean Catch Updated: 08/02/24 0149     Eosinophil Smear 0 % EOS/100 Cells     MRSA Screen, PCR (Inpatient) - Swab, Nares [[id number]]  (Normal) Collected: 08/01/24 0206    Lab Status: Final result Specimen: Swab from Nares Updated: 08/01/24 0346     MRSA PCR No MRSA Detected    Narrative:      The negative predictive value of this diagnostic test is high and should only be used to consider de-escalating anti-MRSA therapy. A positive result may indicate colonization with MRSA and must be correlated clinically.    Urine Culture - Urine, Straight Cath [[id number]]  (Abnormal)  (Susceptibility) Collected:  07/31/24 1851    Lab Status: Final result Specimen: Urine from Straight Cath Updated: 08/04/24 1129     Urine Culture >100,000 CFU/mL Escherichia coli ESBL    Narrative:      Colonization of the urinary tract without infection is common. Treatment is discouraged unless the patient is symptomatic, pregnant, or undergoing an invasive urologic procedure.  Recent outcomes data supports the use of pip/tazo in the treatment of susceptible ESBL infections for uncomplicated UTI. Consider use of pip/tazo as a carbapenem-sparing regimen in applicable patients.    Susceptibility        Escherichia coli ESBL      MATT      Amikacin Susceptible      Ertapenem Susceptible      Gentamicin Resistant      Levofloxacin Susceptible      Meropenem Susceptible      Nitrofurantoin Susceptible      Piperacillin + Tazobactam Susceptible      Tobramycin Resistant      Trimethoprim + Sulfamethoxazole Resistant                           Blood Culture - Blood, Hand, Left [[id number]]  (Normal) Collected: 07/31/24 1849    Lab Status: Final result Specimen: Blood from Hand, Left Updated: 08/05/24 1915     Blood Culture No growth at 5 days    Blood Culture - Blood, Arm, Left [[id number]]  (Abnormal) Collected: 07/31/24 1849    Lab Status: Final result Specimen: Blood from Arm, Left Updated: 08/02/24 0545     Blood Culture Staphylococcus, coagulase negative     Isolated from Aerobic Bottle     Gram Stain Aerobic Bottle Gram positive cocci in clusters    Narrative:      Probable contaminant requires clinical correlation, susceptibility not performed unless requested by physician.      Blood Culture ID, PCR - Blood, Arm, Left [[id number]]  (Abnormal) Collected: 07/31/24 1849    Lab Status: Final result Specimen: Blood from Arm, Left Updated: 08/01/24 1117     BCID, PCR Staph spp, not aureus or lugdunensis. Identification by BCID2 PCR.     BOTTLE TYPE Aerobic Bottle            PATHOLOGY RESULTS:       ASSESSMENT/PLAN   Diabetic foot ulcerations to the  "left foot  Type 2 diabetes mellitus with hyperglycemia  Diabetic polyneuropathy    Review of labs, imaging, and other provider documentation  Comprehensive lower extremity examination and evaluation was performed.    Linear foreign bodies projected over the soft tissues of the second  toe and first toe were noted upon xray imaging (artifacts were also included in the differential). No evidence of soft tissue damage, FB entry, or infection noted to either great or second toes today.      Continue wound care orders- Betadine wet-to-dry dressing to be performed twice daily per nursing.     No surgical interventions are planned at this time from a podiatric standpoint.     Due to difficulty caring for foot wounds at home, our recommendation would be for the patient to go to a skilled nursing facility for ongoing wound care upon discharge.       Thank you kindly for the consult, will continue to follow patient while in house.      This document has been electronically signed by SUSAN Luna on August 8, 2024 16:46 CDT            Electronically signed by Asad Cerrato DPM at 08/08/24 1655       Julia Adrian MD at 08/08/24 1620          INFECTIOUS DISEASES PROGRESS NOTE    Patient:  Erick Luong  YOB: 1955  MRN: [id number]   Admit date: 7/31/2024   Admitting Physician: Alejandrina Barnett DO  Primary Care Physician: Del Shetty MD    Chief Complaint: Patient offers no complaints      Interval History: Patient did not volunteer any complaints.  I asked him if he had talked to his wife and he said, \"today\"    Completed antibiotic course with ertapenem      Allergies:   Allergies   Allergen Reactions    Cefepime Hives and Anaphylaxis    Bactrim [Sulfamethoxazole-Trimethoprim] Other (See Comments)     \"RENAL FAILURE\"    Vancomycin Itching    Zolpidem Mental Status Change     \"makes him crazy\"    Metronidazole Rash       Current Scheduled Medications:   ascorbic acid, 1,000 mg, " "Oral, Daily  donepezil, 10 mg, Oral, Daily  DULoxetine, 60 mg, Oral, Daily  enoxaparin, 1 mg/kg, Subcutaneous, Q12H  famotidine, 20 mg, Oral, Q12H  insulin glargine, 10 Units, Subcutaneous, Daily  insulin regular, 3-14 Units, Subcutaneous, 4x Daily AC & at Bedtime  melatonin, 2.5 mg, Oral, Nightly  oxyCODONE, 10 mg, Oral, BID  pantoprazole, 40 mg, Oral, Q12H  polyethylene glycol, 17 g, Oral, Daily  pregabalin, 100 mg, Oral, Nightly  pregabalin, 50 mg, Oral, Daily  rosuvastatin, 10 mg, Oral, Nightly  senna-docusate sodium, 2 tablet, Oral, BID  sodium bicarbonate, 650 mg, Oral, TID  sodium chloride, 10 mL, Intravenous, Q12H  sodium chloride, 10 mL, Intravenous, Q12H  sodium hypochlorite, , Topical, Q12H      Current PRN Medications:    senna-docusate sodium **AND** polyethylene glycol **AND** bisacodyl **AND** bisacodyl    Calcium Replacement - Follow Nurse / BPA Driven Protocol    dextrose    dextrose    dextrose    glucagon (human recombinant)    Magnesium Standard Dose Replacement - Follow Nurse / BPA Driven Protocol    nitroglycerin    ondansetron    Pharmacy to Dose enoxaparin (LOVENOX)    Phosphorus Replacement - Follow Nurse / BPA Driven Protocol    Potassium Replacement - Follow Nurse / BPA Driven Protocol    sodium chloride    sodium chloride    sodium chloride    sodium chloride    Pharmacy to Dose enoxaparin (LOVENOX),            Objective     Vital Signs:  Temp (24hrs), Av.7 °F (36.5 °C), Min:96.8 °F (36 °C), Max:98.4 °F (36.9 °C)      /76 (BP Location: Left arm, Patient Position: Lying)   Pulse 80   Temp 97.3 °F (36.3 °C) (Oral)   Resp 16   Ht 182.9 cm (72\")   Wt 133 kg (293 lb)   SpO2 95%   BMI 39.74 kg/m²         Physical Exam:    General: The patient is lying in bed on his left side appearing to be in no acute distress  Respiratory: Effort even and unlabored  Left lower extremity with dressing intact.  It was timed for around noon today.  Faintly blood-tinged serous fluid on pad below " his foot.    August 7      Results Review:    I reviewed the patient's new clinical results.    Lab Results:    CBC:   Lab Results   Lab 08/02/24  0558 08/03/24  0605 08/04/24  0615 08/05/24  0444 08/08/24  0449   WBC 9.76 5.75 5.73 5.89 8.08   HEMOGLOBIN 10.0* 10.3* 9.5* 9.5* 9.6*   HEMATOCRIT 31.8* 33.2* 30.5* 30.1* 30.5*   PLATELETS 219 215 201 210 242        AutoDiff:   Lab Results   Lab 08/03/24  0605 08/04/24  0615 08/05/24 0444   NEUTROPHIL % 57.9 60.7 64.0   LYMPHOCYTE % 20.9 20.1 19.9   MONOCYTES % 8.7 9.6 9.2   EOSINOPHIL % 11.1* 7.9* 4.8   BASOPHIL % 0.9 0.7 0.7   NEUTROS ABS 3.33 3.48 3.78   LYMPHS ABS 1.20 1.15 1.17   MONOS ABS 0.50 0.55 0.54   EOS ABS 0.64* 0.45* 0.28   BASOS ABS 0.05 0.04 0.04        Manual Diff:    Lab Results   Lab 08/03/24  0605 08/04/24  0615 08/05/24 0444   NEUTROS ABS 3.33 3.48 3.78           CMP:   Lab Results   Lab 08/03/24  0605 08/04/24  0615 08/05/24  0444 08/06/24  0508 08/07/24  0758 08/08/24 0449   SODIUM 143 145 144 143 144 141   POTASSIUM 3.8 3.9 3.9 4.1 3.9 3.8   CHLORIDE 110* 111* 112* 109* 110* 109*   CO2 19.0* 21.0* 21.0* 22.0 20.0* 21.0*   BUN 31* 24* 20 18 15 15   CREATININE 1.82* 1.60* 1.47* 1.47* 1.50* 1.42*   CALCIUM 8.3* 8.5* 8.3* 8.4* 8.6 8.5*   BILIRUBIN 0.3 0.3 0.3  --   --   --    ALK PHOS 88 95 102  --   --   --    ALT (SGPT) 7 9 9  --   --   --    AST (SGOT) 12 15 16  --   --   --    GLUCOSE 125* 97 89 101* 97 110*       Estimated Creatinine Clearance: 70.3 mL/min (A) (by C-G formula based on SCr of 1.42 mg/dL (H)).    Culture Results:    Microbiology Results (last 10 days)       Procedure Component Value - Date/Time    Wound Culture - Drainage, Foot, Left [[id number]]  (Abnormal) Collected: 08/02/24 1338    Lab Status: Final result Specimen: Drainage from Foot, Left Updated: 08/04/24 0897     Wound Culture Light growth (2+) Streptococcus agalactiae (Group B)     Comment:   This organism is considered to be universally susceptible to penicillin.  No  further antibiotic testing will be performed. If Clindamycin or Erythromycin is the drug of choice, notify the laboratory within 7 days to request susceptibility testing.        Gram Stain Rare (1+) WBCs seen      No organisms seen    Wound Culture - Wound, Foot, Left [[id number]]  (Abnormal) Collected: 08/02/24 1032    Lab Status: Final result Specimen: Wound from Foot, Left Updated: 08/04/24 0857     Wound Culture Light growth (2+) Streptococcus agalactiae (Group B)     Comment:   This organism is considered to be universally susceptible to penicillin.  No further antibiotic testing will be performed. If Clindamycin or Erythromycin is the drug of choice, notify the laboratory within 7 days to request susceptibility testing.        Gram Stain Few (2+) WBCs seen      Rare (1+) Gram positive cocci    Eosinophil Smear - Urine, Urine, Clean Catch [[id number]]  (Normal) Collected: 08/01/24 1547    Lab Status: Final result Specimen: Urine, Clean Catch Updated: 08/02/24 0149     Eosinophil Smear 0 % EOS/100 Cells     MRSA Screen, PCR (Inpatient) - Swab, Nares [[id number]]  (Normal) Collected: 08/01/24 0206    Lab Status: Final result Specimen: Swab from Nares Updated: 08/01/24 0346     MRSA PCR No MRSA Detected    Narrative:      The negative predictive value of this diagnostic test is high and should only be used to consider de-escalating anti-MRSA therapy. A positive result may indicate colonization with MRSA and must be correlated clinically.    Urine Culture - Urine, Straight Cath [[id number]]  (Abnormal)  (Susceptibility) Collected: 07/31/24 1851    Lab Status: Final result Specimen: Urine from Straight Cath Updated: 08/04/24 1129     Urine Culture >100,000 CFU/mL Escherichia coli ESBL    Narrative:      Colonization of the urinary tract without infection is common. Treatment is discouraged unless the patient is symptomatic, pregnant, or undergoing an invasive urologic procedure.  Recent outcomes data supports the use of  pip/tazo in the treatment of susceptible ESBL infections for uncomplicated UTI. Consider use of pip/tazo as a carbapenem-sparing regimen in applicable patients.    Susceptibility        Escherichia coli ESBL      MATT      Amikacin Susceptible      Ertapenem Susceptible      Gentamicin Resistant      Levofloxacin Susceptible      Meropenem Susceptible      Nitrofurantoin Susceptible      Piperacillin + Tazobactam Susceptible      Tobramycin Resistant      Trimethoprim + Sulfamethoxazole Resistant                           Blood Culture - Blood, Hand, Left [[id number]]  (Normal) Collected: 07/31/24 1849    Lab Status: Final result Specimen: Blood from Hand, Left Updated: 08/05/24 1915     Blood Culture No growth at 5 days    Blood Culture - Blood, Arm, Left [[id number]]  (Abnormal) Collected: 07/31/24 1849    Lab Status: Final result Specimen: Blood from Arm, Left Updated: 08/02/24 0545     Blood Culture Staphylococcus, coagulase negative     Isolated from Aerobic Bottle     Gram Stain Aerobic Bottle Gram positive cocci in clusters    Narrative:      Probable contaminant requires clinical correlation, susceptibility not performed unless requested by physician.      Blood Culture ID, PCR - Blood, Arm, Left [[id number]]  (Abnormal) Collected: 07/31/24 1849    Lab Status: Final result Specimen: Blood from Arm, Left Updated: 08/01/24 1117     BCID, PCR Staph spp, not aureus or lugdunensis. Identification by BCID2 PCR.     BOTTLE TYPE Aerobic Bottle                 Radiology:   Imaging Results (Last 72 Hours)       ** No results found for the last 72 hours. **                Active Hospital Problems    Diagnosis     **DKA, type 2, not at goal     E. coli UTI, ESBL     Atrial fibrillation     Chronic heart failure with preserved ejection fraction (HFpEF)     Chronic diastolic heart failure     GERD without esophagitis     Diabetic ulcer of left foot associated with type 2 diabetes mellitus        IMPRESSION:  Diabetic foot  infection-cultures x 2 positive for group B streptococcus.  Patient is status post bedside debridement per SUSAN Hoffmann.  Treated with IV antibiotics, completed August 7.  Urinary tract infection with ESBL E. coli.  According to report, this was a straight catheterization specimen and patient patient did complain of some urinary frequency.  Completed short antibiotic course  History of osteomyelitis with MRSA status posttreatment with vancomycin followed by linezolid.  No MRSA isolated this admission  Uncontrolled diabetes mellitus with hemoglobin A1c greater than 12.  Noted patient is controlled here on half the insulin he reportedly was taking at home.  Chronic kidney disease stage IIIb with acute kidney injury.  Creatinine essentially stable.  Positive blood cultures for coagulase-negative staph-contaminant    RECOMMENDATION:   Continue local wound care  Hold any additional antibiotics.  If any increased erythema or indication of recurrent infection to left foot, would direct therapy towards the group B streptococcus that was isolated previously-would utilize amoxicillin or cephalexin  Awaiting placement    Cussed with Dr. Ericka Adrian MD  08/08/24  16:21 CDT        Electronically signed by Julia Adrian MD at 08/08/24 1726       Alejandrina Barnett DO at 08/08/24 1102              [facility] Medicine Services  INPATIENT PROGRESS NOTE    Patient Name: Erick Luong  Date of Admission: 7/31/2024  Today's Date: 08/08/24  Length of Stay: 8  Primary Care Physician: Del Shetty MD    Subjective   Chief Complaint: diabetic foot ulcer.   HPI     Patient lying in bed.   No new complaints.   Awaiting nursing home for continued wound care.    No new problems         Review of Systems   All pertinent negatives and positives are as above. All other systems have been reviewed and are negative unless otherwise stated.     Objective    Temp:  [96.8 °F (36  °C)-98.4 °F (36.9 °C)] 96.8 °F (36 °C)  Heart Rate:  [69-77] 69  Resp:  [16] 16  BP: (131-147)/(58-73) 131/71  Physical Exam  Vitals and nursing note reviewed.   Constitutional:       Appearance: He is obese.   HENT:      Head: Normocephalic and atraumatic.      Right Ear: External ear normal.      Left Ear: External ear normal.      Nose: Nose normal.      Mouth/Throat:      Mouth: Mucous membranes are moist.   Eyes:      Extraocular Movements: Extraocular movements intact.      Conjunctiva/sclera: Conjunctivae normal.      Pupils: Pupils are equal, round, and reactive to light.   Cardiovascular:      Rate and Rhythm: Normal rate and regular rhythm.      Pulses: Normal pulses.      Heart sounds: Normal heart sounds.   Pulmonary:      Effort: Pulmonary effort is normal.   Abdominal:      General: Bowel sounds are normal.      Palpations: Abdomen is soft.   Musculoskeletal:      Cervical back: Normal range of motion.      Comments: Moves all extremities   Skin:     General: Skin is warm and dry.      Capillary Refill: Capillary refill takes less than 2 seconds.      Comments: Dressing left foot/heel intact.   Neurological:      Mental Status: He is alert and oriented to person, place, and time.   Psychiatric:      Comments: Affect  is flat.             File Link    Scan on 8/7/2024 1702 by Aicha Smallwood RN: Wound Left lateral foot Diabetic Ulcer        Key Information    Document ID File Type Document Type Description   [id number] Image WOUND IMAGE Wound Left lateral foot Diabetic Ulcer     Import Information    Attached At Date Time User Dept   Encounter Level 8/7/2024  5:02 PM Aicha Smallwood RN  Pad 3[facility]     Encounter    Hospital Encounter on 7/31/24             Results Review:  I have reviewed the labs, radiology results, and diagnostic studies.    Laboratory Data:   Results from last 7 days   Lab Units 08/08/24  0449 08/05/24  0444 08/04/24  0615   WBC 10*3/mm3 8.08 5.89 5.73   HEMOGLOBIN  g/dL 9.6* 9.5* 9.5*   HEMATOCRIT % 30.5* 30.1* 30.5*   PLATELETS 10*3/mm3 242 210 201        Results from last 7 days   Lab Units 08/08/24  0449 08/07/24  0758 08/06/24  0508 08/05/24  0444 08/04/24  0615 08/03/24  0605   SODIUM mmol/L 141 144 143 144 145 143   POTASSIUM mmol/L 3.8 3.9 4.1 3.9 3.9 3.8   CHLORIDE mmol/L 109* 110* 109* 112* 111* 110*   CO2 mmol/L 21.0* 20.0* 22.0 21.0* 21.0* 19.0*   BUN mg/dL 15 15 18 20 24* 31*   CREATININE mg/dL 1.42* 1.50* 1.47* 1.47* 1.60* 1.82*   CALCIUM mg/dL 8.5* 8.6 8.4* 8.3* 8.5* 8.3*   BILIRUBIN mg/dL  --   --   --  0.3 0.3 0.3   ALK PHOS U/L  --   --   --  102 95 88   ALT (SGPT) U/L  --   --   --  9 9 7   AST (SGOT) U/L  --   --   --  16 15 12   GLUCOSE mg/dL 110* 97 101* 89 97 125*       Culture Data:   Blood Culture   Date Value Ref Range Status   07/31/2024 No growth at 5 days  Final   07/31/2024 Staphylococcus, coagulase negative (C)  Final     Urine Culture   Date Value Ref Range Status   07/31/2024 >100,000 CFU/mL Escherichia coli ESBL (A)  Final     Wound Culture   Date Value Ref Range Status   08/02/2024 (A)  Final    Light growth (2+) Streptococcus agalactiae (Group B)     Comment:       This organism is considered to be universally susceptible to penicillin.  No further antibiotic testing will be performed. If Clindamycin or Erythromycin is the drug of choice, notify the laboratory within 7 days to request susceptibility testing.   08/02/2024 (A)  Final    Light growth (2+) Streptococcus agalactiae (Group B)     Comment:       This organism is considered to be universally susceptible to penicillin.  No further antibiotic testing will be performed. If Clindamycin or Erythromycin is the drug of choice, notify the laboratory within 7 days to request susceptibility testing.       Radiology Data:   Imaging Results (Last 24 Hours)       ** No results found for the last 24 hours. **            I have reviewed the patient's current medications.     Assessment/Plan    Assessment  Active Hospital Problems    Diagnosis     **DKA, type 2, not at goal     E. coli UTI, ESBL     Atrial fibrillation     Chronic heart failure with preserved ejection fraction (HFpEF)     Chronic diastolic heart failure     GERD without esophagitis     Diabetic ulcer of left foot associated with type 2 diabetes mellitus        Treatment Plan  Discussed with the patient the need for continued care and treatment of the left foot wound, again today.  Patient is unable to take care of himself effectively at home.  I have recommended to the patient again skilled nursing until such a time that the wound is healed.  We discussed that if he does not he will most likely lose the foot secondary to the wound with worsening of wound and infections.   Awaiting placement     Medical Decision Making  Number and Complexity of problems:   3 acute, high complexity problems  4+ chronic, moderate complexity problems     Differential Diagnosis: None     Conditions and Status        Condition is improving.     [facility] Data  External documents reviewed: Care Everywhere  Cardiac tracing (EKG, telemetry) interpretation: See HPI  Radiology interpretation: See HPI  Labs reviewed: See HPI  Any tests that were considered but not ordered: None     Decision rules/scores evaluated (example [id number], Wells, etc): None     Discussed with: The patient     Care Planning  Shared decision making: Patient  Code status and discussions: Full code     Disposition  Social Determinants of Health that impact treatment or disposition: none     I expect the patient to be discharged to SNF  in 1-2 days.    Electronically signed by Alejandrina Barnett DO, 08/08/24, 11:02 CDT.      Electronically signed by Alejandrina Barnett DO at 08/08/24 1106       Stewart Alvarez, APRN at 08/08/24 1021       Attestation signed by Willy Arteaga MD at 08/08/24 1936    I have independently interviewed and examined the patient and reviewed the  laboratory, imaging, notes and all other records as available.  I have discussed key elements of the care plan with the APRN and agree with the findings and care plan documented above except as noted.      Subjective:  Initially acute kidney injury. Baseline chronic kidney disease stage 3b. Baseline creatinine approximately 1.5-1.8. Follows in our office.  History of poorly controlled type 2 diabetes, hypertension, coronary artery disease, diabetic foot ulcer, obesity.  On 7/31, patient was found wandering at home confused.  He was brought to ER for evaluation. He has a nonhealing left foot diabetic ulcer with serosanguineous drainage. Blood glucose level was >700 with A1c >12%. Initial creatinine was 2.67 and has steadily improved since he was admitted. Nephrology consulted on 8/1. Hospital course remarkable for improving renal function and improved blood glucose levels. Moved to medical floor after stabilization.    Today, no overnight events.  Hopeful for discharge soon.  Denied current chest pain, shortness of air at rest, nausea or vomiting.  Up in chair with feet down.    Objective:  Vitals/labs/studies/notes all reviewed  Exam independently verified with additional comments or findings as noted:  Ext: Minimal lower extremity edema    Assessment:  Acute kidney injury/ATN  Chronic kidney disease stage IIIb  Hypertension  Diabetes type 2  Diabetic foot ulcer with sepsis  Anemia chronic kidney disease  Obesity  Metabolic acidosis    Plan:  Discussed with patient, nursing  Workup reviewed today  Monitor labs  Weaned IV fluids  ID evaluation reviewed   Antibiotics per ID-finished ertapenem on 8/7    Willy Arteaga MD  8/8/2024  19:35 CDT        This documentation may indicate a split shared visit as defined in CMS Final rule (CY) 2024 Physician Fee Schedule dated 11/2/2023: for Medicare billing purposes, the “substantive portion” means more than half of the total time spent by the physician or  nonphysician practitioner performing the split (or shared) visit, or a substantive part of the medical decision making.  This visit involved face to face encounters with the patient by both providers on the date of the visit.  Time spent is indicated to identify the substantive portion of the visit, whereas the level of service may be determined by complexity of medical decision making.                      Nephrology ([facility]) Progress Note      Patient:  Erick Luong  YOB: 1955  Date of Service: 8/8/2024  MRN: [id number]   Acct: [id number]   Primary Care Physician: Del Shetty MD  Advance Directive:   Code Status and Medical Interventions: CPR (Attempt to Resuscitate); Full Support   Ordered at: 08/01/24 0053     Level Of Support Discussed With:    Patient     Code Status (Patient has no pulse and is not breathing):    CPR (Attempt to Resuscitate)     Medical Interventions (Patient has pulse or is breathing):    Full Support     Admit Date: 7/31/2024       Hospital Day: 8  Referring Provider: Abebe Garzon DO      Patient personally seen and examined.  Complete chart including Consults, Notes, Operative Reports, Labs, Cardiology, and Radiology studies reviewed as able.        Subjective:  Erick Luong is a 69 y.o. male for whom we were consulted for evaluation and treatment of acute kidney injury. Baseline chronic kidney disease stage 3b. Baseline creatinine approximately 1.5-1.8. Follows in our office.  History of poorly controlled type 2 diabetes, hypertension, coronary artery disease, diabetic foot ulcer, obesity.  On 7/31 patient was found wandering at home, confused. Brought to ER for evaluation. Has a nonhealing left foot diabetic ulcer with serosanguineous drainage. Blood glucose level was >700 with A1c >12%. Initial creatinine was 2.67 and has steadily improved since he was admitted. Nephrology consulted on 8/01. Hospital course remarkable for  improving renal function and improved blood glucose levels. Moved to medical floor    Today is awake and alert. No new complaints or overnight issues. Urine output nonoliguric    Allergies:  Cefepime, Bactrim [sulfamethoxazole-trimethoprim], Vancomycin, Zolpidem, and Metronidazole    Home Meds:  Medications Prior to Admission   Medication Sig Dispense Refill Last Dose    ascorbic acid (VITAMIN C) 1000 MG tablet Take 1 tablet by mouth Daily. 30 tablet 3     bumetanide (BUMEX) 1 MG tablet Take 1 tablet by mouth 2 (Two) Times a Day for 30 days. 60 tablet 0     busPIRone (BUSPAR) 10 MG tablet Take 1 tablet by mouth 2 (Two) Times a Day.       calcitriol (ROCALTROL) 0.5 MCG capsule Take 1 capsule by mouth Daily. 90 capsule 4     carvedilol (COREG) 3.125 MG tablet Take 1 tablet twice a day by oral route. 60 tablet 4     donepezil (ARICEPT) 10 MG tablet Take 1 tablet by mouth Daily. 90 tablet 1     DULoxetine (CYMBALTA) 60 MG capsule Take 1 capsule by mouth Daily. 90 capsule 4     famotidine (PEPCID) 20 MG tablet Take 1 tablet twice a day by oral route. 180 tablet 4     Insulin Glargine (Lantus SoloStar) 100 UNIT/ML injection pen Inject 20 Units under the skin into the appropriate area as directed every night at bedtime. 15 mL 3     Insulin Regular Human, Conc, (HumuLIN R) 500 UNIT/ML solution pen-injector CONCENTRATED injection Inject 40 Units under the skin into the appropriate area as directed 2 (Two) Times a Day Before Meals. Inject 40 units under the skin in the the appropriate area with regular meals AND 40 units with large meals.       Iron-Vitamin C (Vitron-C)  MG tablet Take 1 tablet twice a day by oral route. 60 tablet 2     levocetirizine (XYZAL) 5 MG tablet Take 1 tablet by mouth every day at bedtime. 30 tablet 2     melatonin 3 MG tablet Take 2 tablets by mouth At Night As Needed for Sleep. 30 tablet 0     oxyCODONE (ROXICODONE) 10 MG tablet Take 1 tablet by mouth 2 (Two) Times a Day As Needed. Must last  30 days per md. 55 tablet 0     pantoprazole (PROTONIX) 40 MG EC tablet Take 1 tablet by mouth Every 12 (Twelve) Hours. 180 tablet 4     pregabalin (LYRICA) 100 MG capsule Take 1 capsule by mouth Daily.       pregabalin (LYRICA) 100 MG capsule Take 2 capsules by mouth every night at bedtime.       rosuvastatin (CRESTOR) 10 MG tablet Take 1 tablet by mouth Every Night. 30 tablet 2     sucralfate (CARAFATE) 1 g tablet Take 1 tablet by mouth 4 (Four) Times a Day before meals. 360 tablet 1     Diclofenac Sodium (VOLTAREN) 1 % gel gel Apply 2 g topically to the appropriate area as directed 4 (Four) Times a Day As Needed. 300 g 11     nitroglycerin (NITROSTAT) 0.4 MG SL tablet Place 1 tablet under the tongue Every 5 (Five) Minutes As Needed for Chest Pain. Take no more than 3 doses in 15 minutes.       polyethylene glycol (MIRALAX) 17 GM/SCOOP powder Take 17 g by mouth Daily As Needed (constipation).       sennosides-docusate (PERICOLACE) 8.6-50 MG per tablet Take 1 tablet by mouth Every Night. Obtain OTC          Medicines:  Current Facility-Administered Medications   Medication Dose Route Frequency Provider Last Rate Last Admin    ascorbic acid (VITAMIN C) tablet 1,000 mg  1,000 mg Oral Daily Reuben Garza APRN   1,000 mg at 08/08/24 0823    sennosides-docusate (PERICOLACE) 8.6-50 MG per tablet 2 tablet  2 tablet Oral BID Lamine Figueroa APRN   2 tablet at 08/07/24 2229    And    polyethylene glycol (MIRALAX) packet 17 g  17 g Oral Daily PRN Lamine Figueroa APRN        And    bisacodyl (DULCOLAX) EC tablet 5 mg  5 mg Oral Daily PRN Lamine Figueroa APRN        And    bisacodyl (DULCOLAX) suppository 10 mg  10 mg Rectal Daily PRN Lamine Figueroa APRN   10 mg at 08/02/24 0941    Calcium Replacement - Follow Nurse / BPA Driven Protocol   Does not apply PRN Abebe Gazron DO        dextrose (D50W) (25 g/50 mL) IV injection 10-50 mL  10-50 mL Intravenous Q15 Min PRN Duran, Ali Kristina, DO        dextrose (D50W)  (25 g/50 mL) IV injection 25 g  25 g Intravenous Q15 Min PRN Lamine Figueroa APRN        dextrose (GLUTOSE) oral gel 15 g  15 g Oral Q15 Min PRN Lamine Figueroa APRN        donepezil (ARICEPT) tablet 10 mg  10 mg Oral Daily Reuben Garza APRN   10 mg at 08/08/24 0824    DULoxetine (CYMBALTA) DR capsule 60 mg  60 mg Oral Daily Reuben Garza APRN   60 mg at 08/08/24 0824    Enoxaparin Sodium (LOVENOX) syringe 135 mg  1 mg/kg Subcutaneous Q12H Abebe Garzon DO   135 mg at 08/08/24 0831    famotidine (PEPCID) tablet 20 mg  20 mg Oral Q12H Reuben Garza APRN   20 mg at 08/08/24 0824    glucagon (GLUCAGEN) injection 1 mg  1 mg Intramuscular Q15 Min PRN Lamine Figueroa APRN        insulin glargine (LANTUS, SEMGLEE) injection 10 Units  10 Units Subcutaneous Daily Keren Jamison MD   10 Units at 08/08/24 0823    insulin regular (humuLIN R,novoLIN R) injection 3-14 Units  3-14 Units Subcutaneous 4x Daily AC & at Bedtime Keren Jamison MD   3 Units at 08/03/24 2033    Magnesium Standard Dose Replacement - Follow Nurse / BPA Driven Protocol   Does not apply PRN Abebe Garzon DO        melatonin tablet 2.5 mg  2.5 mg Oral Nightly Reuben Garza APRN   2.5 mg at 08/07/24 2230    nitroglycerin (NITROSTAT) SL tablet 0.4 mg  0.4 mg Sublingual Q5 Min PRN Lamine Figueroa APRN        ondansetron (ZOFRAN) injection 4 mg  4 mg Intravenous Q6H PRN Payam Keyes DO   4 mg at 08/08/24 0900    oxyCODONE (ROXICODONE) immediate release tablet 10 mg  10 mg Oral BID Lamine Figueroa APRN   10 mg at 08/08/24 0824    pantoprazole (PROTONIX) EC tablet 40 mg  40 mg Oral Q12H Reuben Garza APRN   40 mg at 08/08/24 0824    Pharmacy to Dose enoxaparin (LOVENOX)   Does not apply Continuous PRN Abebe Garzon DO        Phosphorus Replacement - Follow Nurse / BPA Driven Protocol   Does not apply PRN Abebe Garzon DO        polyethylene glycol (MIRALAX) packet 17 g  17 g Oral Daily Reuben Garza,  APRN   17 g at 08/08/24 0823    Potassium Replacement - Follow Nurse / BPA Driven Protocol   Does not apply PRN Abebe Garzon DO        pregabalin (LYRICA) capsule 100 mg  100 mg Oral Nightly Reuben Garza APRN   100 mg at 08/07/24 2229    pregabalin (LYRICA) capsule 50 mg  50 mg Oral Daily Reuben Garza APRN   50 mg at 08/08/24 0823    rosuvastatin (CRESTOR) tablet 10 mg  10 mg Oral Nightly Reuben Garza APRN   10 mg at 08/07/24 2231    sodium bicarbonate tablet 650 mg  650 mg Oral TID Brian Lomas MD   650 mg at 08/08/24 0824    sodium chloride 0.9 % flush 10 mL  10 mL Intravenous Q12H Abebe Garzon DO   10 mL at 08/08/24 0825    sodium chloride 0.9 % flush 10 mL  10 mL Intravenous PRN Abebe Garzon DO        sodium chloride 0.9 % flush 10 mL  10 mL Intravenous Q12H Lamine Figueroa APRN   10 mL at 08/08/24 0823    sodium chloride 0.9 % flush 10 mL  10 mL Intravenous PRN Lamine Figueroa APRN        sodium chloride 0.9 % infusion 40 mL  40 mL Intravenous PRN Abebe Garzon DO        sodium chloride 0.9 % infusion 40 mL  40 mL Intravenous PRN Lamine Figueroa APRN        sodium hypochlorite (DAKIN'S 1/4 STRENGTH) 0.125 % topical solution 0.125% solution   Topical Q12H Aby High APRN   Given at 08/06/24 1000       Past Medical History:  Past Medical History:   Diagnosis Date    Arthritis     Autonomic disease     CAD (coronary artery disease) 02/06/2017    Cervical radiculopathy 09/16/2021    Chronic constipation with acute exaccerbation 05/10/2021    Coronary artery disease     Degeneration of cervical intervertebral disc 08/11/2021    Diabetes mellitus     Diabetic foot ulcer 08/31/2020    Diabetic polyneuropathy associated with type 2 diabetes mellitus 01/18/2021    Elevated cholesterol     Gastroesophageal reflux disease 05/13/2019    Headache     HTN (hypertension), benign 05/03/2017    Hyperlipidemia     Hypertension     Mixed hyperlipidemia 02/07/2017    Multiple  lung nodules 01/26/2020    5mm, 9 mm RLL identified 1/2020, not present 10/2019.    Myocardial infarction     Osteomyelitis 01/22/2020    Osteomyelitis of fifth toe of right foot 10/07/2019    Pancreatitis     Persistent insomnia 01/20/2020    Renal disorder     Sleep apnea 02/06/2017    Sleep apnea with use of continuous positive airway pressure (CPAP)     NON-COMPLIANT    Slow transit constipation 01/16/2019    Spinal stenosis in cervical region 09/16/2021    Vitamin D deficiency 03/02/2021       Past Surgical History:  Past Surgical History:   Procedure Laterality Date    ABDOMINAL SURGERY      AMPUTATION FOOT / TOE Left 10/2021    5th digit     ANTERIOR CERVICAL DISCECTOMY W/ FUSION N/A 08/05/2022    Procedure: CERVICAL DISCECTOMY ANTERIOR WITH FUSION C5-6 with possible plating of C5-7 with neuromonitoring and 1 c-arm;  Surgeon: Karel Soliz MD;  Location: [facility] OR;  Service: Neurosurgery;  Laterality: N/A;    APPENDECTOMY      BACK SURGERY      CARDIAC CATHETERIZATION Left 02/08/2021    Procedure: Left Heart Cath w poss intervention left anatomical snuff box acess;  Surgeon: Omkar Charles DO;  Location: [facility] CATH INVASIVE LOCATION;  Service: Cardiology;  Laterality: Left;    CARDIAC SURGERY      CATARACT EXTRACTION      CERVICAL SPINE SURGERY      COLONOSCOPY N/A 01/31/2017    Normal exam repeat in 5 years    COLONOSCOPY N/A 02/11/2019    Mild acute inflammation    COLONOSCOPY N/A 04/07/2024    2 areas at 10 and 20 cm with friability ulceration 2 clips placed at 20 cm and 4 clips at 10 cm poor prep normal mucosa,mild eroisions and ulcerations in visible vessels    COLONOSCOPY N/A 7/1/2024    Procedure: COLONOSCOPY WITH ANESTHESIA;  Surgeon: Arsalan Lorenzo DO;  Location: [facility] ENDOSCOPY;  Service: Gastroenterology;  Laterality: N/A;  pre op constipation/diarrhea  post poor prep  pcp Del Shetty    COLONOSCOPY N/A 7/2/2024    Procedure: COLONOSCOPY WITH ANESTHESIA;  Surgeon: Candi  Agapito BENJAMIN MD;  Location: [facility] ENDOSCOPY;  Service: Gastroenterology;  Laterality: N/A;  pre rectal bleeding  post poor prep  pcp Del Shetty MD    COLONOSCOPY W/ POLYPECTOMY  2014    Hyperplastic polyp    CORONARY ARTERY BYPASS GRAFT  10/2015    ENDOSCOPY  2011    Gastritis with hemorrhage    ENDOSCOPY N/A 2017    Normal exam    ENDOSCOPY N/A 2019    Gastritis    ENDOSCOPY N/A 2020    Non-erosive gastritis with hemorrhage    ENDOSCOPY N/A 02/10/2021    Esophagitis    ENDOSCOPY N/A 2024    There were esophageal mucosal changes suspicious for short-segment Low's esophagus present in the distal esophagus. The maximum longitudinal extent of these mucosal changes was 2 cm in length. Mucosa was biopsied with a cold forceps for histologyDistal esophagus, biopsies: Mild chronic active esophagogastritis. No evidence of intestinal metaplasia, dysplasia. Antrum, bx, Mild chronic gastritis    FOOT SURGERY Left     INCISION AND DRAINAGE OF WOUND Left 2007    spider bite       Family History  Family History   Problem Relation Age of Onset    Colon cancer Father     Heart disease Father     Colon cancer Sister     Colon polyps Sister     Alzheimer's disease Mother     Coronary artery disease Sister     Coronary artery disease Sister        Social History  Social History     Socioeconomic History    Marital status:    Tobacco Use    Smoking status: Former     Current packs/day: 0.00     Types: Cigarettes     Quit date:      Years since quittin.6    Smokeless tobacco: Never    Tobacco comments:     [facility]   Vaping Use    Vaping status: Never Used   Substance and Sexual Activity    Alcohol use: No    Drug use: No    Sexual activity: Defer       Review of Systems:  History obtained from chart review and the patient  General ROS: No fever or chills  Respiratory ROS: No cough, shortness of breath, wheezing  Cardiovascular ROS: No chest pain or  palpitations  Gastrointestinal ROS: No abdominal pain or melena  Genito-Urinary ROS: No dysuria or hematuria  Psych ROS: No anxiety and depression  14 point ROS reviewed with the patient and negative except as noted above and in the HPI unless unable to obtain.    Objective:  Patient Vitals for the past 24 hrs:   BP Temp Temp src Pulse Resp SpO2 Weight   08/08/24 0803 131/71 96.8 °F (36 °C) Axillary 69 16 95 % --   08/08/24 0600 -- -- -- -- -- -- 133 kg (293 lb)   08/08/24 0454 146/71 98.2 °F (36.8 °C) Oral 72 16 96 % --   08/07/24 2001 146/73 98.4 °F (36.9 °C) Oral 76 16 94 % --   08/07/24 1542 147/65 98.2 °F (36.8 °C) Oral 77 16 96 % --   08/07/24 1134 136/58 98.1 °F (36.7 °C) Oral 76 16 100 % --       Intake/Output Summary (Last 24 hours) at 8/8/2024 1022  Last data filed at 8/8/2024 0600  Gross per 24 hour   Intake --   Output 250 ml   Net -250 ml       General: awake/alert   Chest:  clear to auscultation bilaterally without respiratory distress  CVS: regular rate and rhythm  Abdominal: soft, nontender, positive bowel sounds  Extremities: no cyanosis or edema  Skin:  left foot ulcer and warm and dry without rash      Labs:  Results from last 7 days   Lab Units 08/08/24  0449 08/05/24  0444 08/04/24  0615   WBC 10*3/mm3 8.08 5.89 5.73   HEMOGLOBIN g/dL 9.6* 9.5* 9.5*   HEMATOCRIT % 30.5* 30.1* 30.5*   PLATELETS 10*3/mm3 242 210 201         Results from last 7 days   Lab Units 08/08/24  0449 08/07/24  0758 08/06/24  0508 08/05/24  0444 08/04/24  0615 08/03/24  0605   SODIUM mmol/L 141 144 143 144 145 143   POTASSIUM mmol/L 3.8 3.9 4.1 3.9 3.9 3.8   CHLORIDE mmol/L 109* 110* 109* 112* 111* 110*   CO2 mmol/L 21.0* 20.0* 22.0 21.0* 21.0* 19.0*   BUN mg/dL 15 15 18 20 24* 31*   CREATININE mg/dL 1.42* 1.50* 1.47* 1.47* 1.60* 1.82*   CALCIUM mg/dL 8.5* 8.6 8.4* 8.3* 8.5* 8.3*   EGFR mL/min/1.73 53.8* 50.4* 51.6* 51.6* 46.6* 40.0*   BILIRUBIN mg/dL  --   --   --  0.3 0.3 0.3   ALK PHOS U/L  --   --   --  102 95 88   ALT  "(SGPT) U/L  --   --   --  9 9 7   AST (SGOT) U/L  --   --   --  16 15 12   GLUCOSE mg/dL 110* 97 101* 89 97 125*       Radiology:   Imaging Results (Last 72 Hours)       ** No results found for the last 72 hours. **            Culture:  Blood Culture   Date Value Ref Range Status   07/31/2024 No growth at 24 hours  Preliminary   07/31/2024 Staphylococcus, coagulase negative (C)  Final         Assessment    Acute kidney injury, ATN--resolving  Baseline chronic kidney disease stage 3b  Type 2 diabetes  Hypertension  Sepsis related to diabetic foot ulcer  Anemia of CKD  Obesity     Plan:   Encourage compliance with medications and diabetic diet  Renal function remains stable   Monitor labs      Stewart Alvarez, APRN  8/8/2024  10:22 CDT      Electronically signed by Willy Arteaga MD at 08/08/24 1936       Julia Adrian MD at 08/07/24 1511          INFECTIOUS DISEASES PROGRESS NOTE    Patient:  Erick Luong  YOB: 1955  MRN: [id number]   Admit date: 7/31/2024   Admitting Physician: Alejandrina Barnett DO  Primary Care Physician: Del Shetty MD    Chief Complaint: Still not much appetite    Interval History: Patient notes that he does not have much of an appetite still.  He is sitting up at bedside.  ELVIS Murphy getting ready to give him some of his medications.        Allergies:   Allergies   Allergen Reactions    Cefepime Hives and Anaphylaxis    Bactrim [Sulfamethoxazole-Trimethoprim] Other (See Comments)     \"RENAL FAILURE\"    Vancomycin Itching    Zolpidem Mental Status Change     \"makes him crazy\"    Metronidazole Rash       Current Scheduled Medications:   ascorbic acid, 1,000 mg, Oral, Daily  donepezil, 10 mg, Oral, Daily  DULoxetine, 60 mg, Oral, Daily  enoxaparin, 1 mg/kg, Subcutaneous, Q12H  ertapenem, 1,000 mg, Intravenous, Q24H  famotidine, 20 mg, Oral, Q12H  insulin glargine, 10 Units, Subcutaneous, Daily  insulin regular, 3-14 Units, Subcutaneous, 4x Daily AC " "& at Bedtime  melatonin, 2.5 mg, Oral, Nightly  oxyCODONE, 10 mg, Oral, BID  pantoprazole, 40 mg, Oral, Q12H  polyethylene glycol, 17 g, Oral, Daily  pregabalin, 100 mg, Oral, Nightly  pregabalin, 50 mg, Oral, Daily  rosuvastatin, 10 mg, Oral, Nightly  senna-docusate sodium, 2 tablet, Oral, BID  sodium bicarbonate, 650 mg, Oral, TID  sodium chloride, 10 mL, Intravenous, Q12H  sodium chloride, 10 mL, Intravenous, Q12H  sodium hypochlorite, , Topical, Q12H      Current PRN Medications:    senna-docusate sodium **AND** polyethylene glycol **AND** bisacodyl **AND** bisacodyl    Calcium Replacement - Follow Nurse / BPA Driven Protocol    dextrose    dextrose    dextrose    glucagon (human recombinant)    Magnesium Standard Dose Replacement - Follow Nurse / BPA Driven Protocol    nitroglycerin    ondansetron    Pharmacy to Dose enoxaparin (LOVENOX)    Phosphorus Replacement - Follow Nurse / BPA Driven Protocol    Potassium Replacement - Follow Nurse / BPA Driven Protocol    sodium chloride    sodium chloride    sodium chloride    sodium chloride    Pharmacy to Dose enoxaparin (LOVENOX),            Objective     Vital Signs:  Temp (24hrs), Av.3 °F (36.8 °C), Min:98.1 °F (36.7 °C), Max:98.6 °F (37 °C)      /76 (BP Location: Right arm, Patient Position: Lying)   Pulse 74   Temp 98.6 °F (37 °C) (Oral)   Resp 16   Ht 182.9 cm (72\")   Wt 133 kg (293 lb 3.2 oz)   SpO2 96%   BMI 39.77 kg/m²         Physical Exam:    General: Patient sitting up at bedside in no acute distress  Lungs: Fairly clear posteriorly  Left foot with dressing in place and offload shoe in place    Results Review:    I reviewed the patient's new clinical results.    Lab Results:    CBC:   Lab Results   Lab 24  1849 24  0419 24  0558 24  0605 24  0615 24  0444   WBC 15.48* 12.83* 9.76 5.75 5.73 5.89   HEMOGLOBIN 9.8* 9.0* 10.0* 10.3* 9.5* 9.5*   HEMATOCRIT 29.6* 28.3* 31.8* 33.2* 30.5* 30.1*   PLATELETS 201 " 176 219 215 201 210        AutoDiff:   Lab Results   Lab 08/03/24  0605 08/04/24  0615 08/05/24  0444   NEUTROPHIL % 57.9 60.7 64.0   LYMPHOCYTE % 20.9 20.1 19.9   MONOCYTES % 8.7 9.6 9.2   EOSINOPHIL % 11.1* 7.9* 4.8   BASOPHIL % 0.9 0.7 0.7   NEUTROS ABS 3.33 3.48 3.78   LYMPHS ABS 1.20 1.15 1.17   MONOS ABS 0.50 0.55 0.54   EOS ABS 0.64* 0.45* 0.28   BASOS ABS 0.05 0.04 0.04        Manual Diff:    Lab Results   Lab 08/03/24  0605 08/04/24 0615 08/05/24 0444   NEUTROS ABS 3.33 3.48 3.78           CMP:   Lab Results   Lab 08/03/24  0605 08/04/24  0615 08/05/24 0444 08/06/24  0508 08/07/24  0758   SODIUM 143 145 144 143 144   POTASSIUM 3.8 3.9 3.9 4.1 3.9   CHLORIDE 110* 111* 112* 109* 110*   CO2 19.0* 21.0* 21.0* 22.0 20.0*   BUN 31* 24* 20 18 15   CREATININE 1.82* 1.60* 1.47* 1.47* 1.50*   CALCIUM 8.3* 8.5* 8.3* 8.4* 8.6   BILIRUBIN 0.3 0.3 0.3  --   --    ALK PHOS 88 95 102  --   --    ALT (SGPT) 7 9 9  --   --    AST (SGOT) 12 15 16  --   --    GLUCOSE 125* 97 89 101* 97       Estimated Creatinine Clearance: 66.5 mL/min (A) (by C-G formula based on SCr of 1.5 mg/dL (H)).    Culture Results:    Microbiology Results (last 10 days)       Procedure Component Value - Date/Time    Wound Culture - Drainage, Foot, Left [[id number]]  (Abnormal) Collected: 08/02/24 1338    Lab Status: Final result Specimen: Drainage from Foot, Left Updated: 08/04/24 0857     Wound Culture Light growth (2+) Streptococcus agalactiae (Group B)     Comment:   This organism is considered to be universally susceptible to penicillin.  No further antibiotic testing will be performed. If Clindamycin or Erythromycin is the drug of choice, notify the laboratory within 7 days to request susceptibility testing.        Gram Stain Rare (1+) WBCs seen      No organisms seen    Wound Culture - Wound, Foot, Left [[id number]]  (Abnormal) Collected: 08/02/24 1032    Lab Status: Final result Specimen: Wound from Foot, Left Updated: 08/04/24 0857     Wound  Culture Light growth (2+) Streptococcus agalactiae (Group B)     Comment:   This organism is considered to be universally susceptible to penicillin.  No further antibiotic testing will be performed. If Clindamycin or Erythromycin is the drug of choice, notify the laboratory within 7 days to request susceptibility testing.        Gram Stain Few (2+) WBCs seen      Rare (1+) Gram positive cocci    Eosinophil Smear - Urine, Urine, Clean Catch [[id number]]  (Normal) Collected: 08/01/24 1547    Lab Status: Final result Specimen: Urine, Clean Catch Updated: 08/02/24 0149     Eosinophil Smear 0 % EOS/100 Cells     MRSA Screen, PCR (Inpatient) - Swab, Nares [[id number]]  (Normal) Collected: 08/01/24 0206    Lab Status: Final result Specimen: Swab from Nares Updated: 08/01/24 0346     MRSA PCR No MRSA Detected    Narrative:      The negative predictive value of this diagnostic test is high and should only be used to consider de-escalating anti-MRSA therapy. A positive result may indicate colonization with MRSA and must be correlated clinically.    Urine Culture - Urine, Straight Cath [[id number]]  (Abnormal)  (Susceptibility) Collected: 07/31/24 1851    Lab Status: Final result Specimen: Urine from Straight Cath Updated: 08/04/24 1129     Urine Culture >100,000 CFU/mL Escherichia coli ESBL    Narrative:      Colonization of the urinary tract without infection is common. Treatment is discouraged unless the patient is symptomatic, pregnant, or undergoing an invasive urologic procedure.  Recent outcomes data supports the use of pip/tazo in the treatment of susceptible ESBL infections for uncomplicated UTI. Consider use of pip/tazo as a carbapenem-sparing regimen in applicable patients.    Susceptibility        Escherichia coli ESBL      MATT      Amikacin Susceptible      Ertapenem Susceptible      Gentamicin Resistant      Levofloxacin Susceptible      Meropenem Susceptible      Nitrofurantoin Susceptible      Piperacillin +  Tazobactam Susceptible      Tobramycin Resistant      Trimethoprim + Sulfamethoxazole Resistant                           Blood Culture - Blood, Hand, Left [[id number]]  (Normal) Collected: 07/31/24 1849    Lab Status: Final result Specimen: Blood from Hand, Left Updated: 08/05/24 1915     Blood Culture No growth at 5 days    Blood Culture - Blood, Arm, Left [[id number]]  (Abnormal) Collected: 07/31/24 1849    Lab Status: Final result Specimen: Blood from Arm, Left Updated: 08/02/24 0545     Blood Culture Staphylococcus, coagulase negative     Isolated from Aerobic Bottle     Gram Stain Aerobic Bottle Gram positive cocci in clusters    Narrative:      Probable contaminant requires clinical correlation, susceptibility not performed unless requested by physician.      Blood Culture ID, PCR - Blood, Arm, Left [[id number]]  (Abnormal) Collected: 07/31/24 1849    Lab Status: Final result Specimen: Blood from Arm, Left Updated: 08/01/24 1117     BCID, PCR Staph spp, not aureus or lugdunensis. Identification by BCID2 PCR.     BOTTLE TYPE Aerobic Bottle                 Radiology:   Imaging Results (Last 72 Hours)       ** No results found for the last 72 hours. **                Active Hospital Problems    Diagnosis     **DKA, type 2, not at goal     E. coli UTI, ESBL     Atrial fibrillation     Chronic heart failure with preserved ejection fraction (HFpEF)     Chronic diastolic heart failure     GERD without esophagitis     Diabetic ulcer of left foot associated with type 2 diabetes mellitus        IMPRESSION:  Diabetic foot infection-cultures x 2 positive for group B streptococcus.  Patient is status post bedside debridement per SUSAN Hoffmann  Urinary tract infection with ESBL E. coli.  According to report, this was a straight catheterization specimen and patient patient did complain of some urinary frequency.  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  Uncontrolled  diabetes mellitus with hemoglobin A1c greater than 12.  Noted patient is controlled here on half the insulin he reportedly was taking at home.  Chronic kidney disease stage IIIb with acute kidney injury.  Creatinine essentially stable.  Positive blood cultures for coagulase-negative staph-contaminant    RECOMMENDATION:   Discontinue ertapenem after today's dose.  Will have nursing place updated photo in chart of left foot heel wound as well as lateral foot  Will reevaluate tomorrow for need of any additional oral antibiotic therapy.  Diabetes management per attending  Noted recommendations for skilled nursing facility for better wound care in this patient      Review Dr. Barnett's note  Reviewed neurology note    Julia Adrian MD  08/07/24  15:11 CDT        Electronically signed by Julia Adrian MD at 08/07/24 1604       Alejandrina Barnett DO at 08/07/24 1443              [facility] Medicine Services  INPATIENT PROGRESS NOTE    Patient Name: Erick Luong  Date of Admission: 7/31/2024  Today's Date: 08/07/24  Length of Stay: 7  Primary Care Physician: Del Shetty MD    Subjective   Chief Complaint: diabetic foot ulcer.   HPI   Patient sitting up in chair.  He is without major complaint at this time.  Patient is not very conversant.  This is his usual demeanor.  He did not qualify for transfer to LTAC.    Patient is stable and is ready for transition to a lower level of care.  Skilled nursing facility would be appropriate.    Blood sugars while he has been in the hospital have remained controlled on approximately half of the amount of insulin he is to take at home.      Review of Systems   All pertinent negatives and positives are as above. All other systems have been reviewed and are negative unless otherwise stated.     Objective    Temp:  [98.1 °F (36.7 °C)-98.6 °F (37 °C)] 98.6 °F (37 °C)  Heart Rate:  [67-97] 74  Resp:  [16-18] 16  BP: (139-161)/(61-77)  146/76  Physical Exam  Vitals and nursing note reviewed.   Constitutional:       Appearance: He is obese.   HENT:      Head: Normocephalic and atraumatic.      Right Ear: External ear normal.      Left Ear: External ear normal.      Nose: Nose normal.      Mouth/Throat:      Mouth: Mucous membranes are moist.   Eyes:      Extraocular Movements: Extraocular movements intact.      Conjunctiva/sclera: Conjunctivae normal.      Pupils: Pupils are equal, round, and reactive to light.   Cardiovascular:      Rate and Rhythm: Normal rate and regular rhythm.      Pulses: Normal pulses.      Heart sounds: Normal heart sounds.   Pulmonary:      Effort: Pulmonary effort is normal.   Abdominal:      General: Bowel sounds are normal.      Palpations: Abdomen is soft.   Musculoskeletal:      Cervical back: Normal range of motion.      Comments: Moves all extremities   Skin:     General: Skin is warm and dry.      Capillary Refill: Capillary refill takes less than 2 seconds.      Comments: Dressing left foot/heel intact.   Neurological:      Mental Status: He is alert and oriented to person, place, and time.   Psychiatric:      Comments: After ACT is flat.             Results Review:  I have reviewed the labs, radiology results, and diagnostic studies.    Laboratory Data:   Results from last 7 days   Lab Units 08/05/24  0444 08/04/24  0615 08/03/24  0605   WBC 10*3/mm3 5.89 5.73 5.75   HEMOGLOBIN g/dL 9.5* 9.5* 10.3*   HEMATOCRIT % 30.1* 30.5* 33.2*   PLATELETS 10*3/mm3 210 201 215        Results from last 7 days   Lab Units 08/07/24  0758 08/06/24  0508 08/05/24  0444 08/04/24  0615 08/03/24  0605   SODIUM mmol/L 144 143 144 145 143   POTASSIUM mmol/L 3.9 4.1 3.9 3.9 3.8   CHLORIDE mmol/L 110* 109* 112* 111* 110*   CO2 mmol/L 20.0* 22.0 21.0* 21.0* 19.0*   BUN mg/dL 15 18 20 24* 31*   CREATININE mg/dL 1.50* 1.47* 1.47* 1.60* 1.82*   CALCIUM mg/dL 8.6 8.4* 8.3* 8.5* 8.3*   BILIRUBIN mg/dL  --   --  0.3 0.3 0.3   ALK PHOS U/L  --    --  102 95 88   ALT (SGPT) U/L  --   --  9 9 7   AST (SGOT) U/L  --   --  16 15 12   GLUCOSE mg/dL 97 101* 89 97 125*       Culture Data:   Blood Culture   Date Value Ref Range Status   07/31/2024 No growth at 5 days  Final   07/31/2024 Staphylococcus, coagulase negative (C)  Final     Urine Culture   Date Value Ref Range Status   07/31/2024 >100,000 CFU/mL Escherichia coli ESBL (A)  Final     Wound Culture   Date Value Ref Range Status   08/02/2024 (A)  Final    Light growth (2+) Streptococcus agalactiae (Group B)     Comment:       This organism is considered to be universally susceptible to penicillin.  No further antibiotic testing will be performed. If Clindamycin or Erythromycin is the drug of choice, notify the laboratory within 7 days to request susceptibility testing.   08/02/2024 (A)  Final    Light growth (2+) Streptococcus agalactiae (Group B)     Comment:       This organism is considered to be universally susceptible to penicillin.  No further antibiotic testing will be performed. If Clindamycin or Erythromycin is the drug of choice, notify the laboratory within 7 days to request susceptibility testing.       Radiology Data:   Imaging Results (Last 24 Hours)       ** No results found for the last 24 hours. **            I have reviewed the patient's current medications.     Assessment/Plan   Assessment  Active Hospital Problems    Diagnosis     **DKA, type 2, not at goal     E. coli UTI, ESBL     Atrial fibrillation     Chronic heart failure with preserved ejection fraction (HFpEF)     Chronic diastolic heart failure     GERD without esophagitis     Diabetic ulcer of left foot associated with type 2 diabetes mellitus        Treatment Plan  Discussed with the patient the need for continued care and treatment of the left foot wound.  Patient is unable to take care of himself effectively at home.  On admission apparently Adult Protective Services was contacted.  I have recommended to the patient again  skilled nursing until such a time that the wound is healed.  We discussed that if he does not he will most likely lose the foot secondary to the wound with worsening of wound and infections.  Patient says he is willing to consider going to a nursing home.  I discussed this with  and they will discuss with him to see where he would like to go.       Medical Decision Making  Number and Complexity of problems:   3 acute, high complexity problems  4+ chronic, moderate complexity problems     Differential Diagnosis: None     Conditions and Status        Condition is improving.     [facility] Data  External documents reviewed: Care Everywhere  Cardiac tracing (EKG, telemetry) interpretation: See HPI  Radiology interpretation: See HPI  Labs reviewed: See HPI  Any tests that were considered but not ordered: None     Decision rules/scores evaluated (example [id number], Wells, etc): None     Discussed with: The patient     Care Planning  Shared decision making: Patient  Code status and discussions: Full code     Disposition  Social Determinants of Health that impact treatment or disposition: none     I expect the patient to be discharged to SNF  in 1-2 days.    Electronically signed by Alejandrina Barnett DO, 08/07/24, 14:43 CDT.      Electronically signed by Alejandrina Barnett DO at 08/07/24 1449       Stewart Alvarez, APRN at 08/07/24 1007       Attestation signed by Willy Arteaga MD at 08/07/24 1715    I have independently interviewed and examined the patient and reviewed the laboratory, imaging, notes and all other records as available.  I have discussed key elements of the care plan with the APRN and agree with the findings and care plan documented above except as noted.      Subjective:  Initially acute kidney injury. Baseline chronic kidney disease stage 3b. Baseline creatinine approximately 1.5-1.8. Follows in our office.  History of poorly controlled type 2 diabetes, hypertension,  coronary artery disease, diabetic foot ulcer, obesity.  On 7/31, patient was found wandering at home confused.  He was brought to ER for evaluation. He has a nonhealing left foot diabetic ulcer with serosanguineous drainage. Blood glucose level was >700 with A1c >12%. Initial creatinine was 2.67 and has steadily improved since he was admitted. Nephrology consulted on 8/1. Hospital course remarkable for improving renal function and improved blood glucose levels. Moved to medical floor after stabilization.    Today, no overnight events.  Hopeful for discharge soon.  Denied current chest pain, shortness of air at rest, nausea or vomiting.  Up in chair with feet down.    Objective:  Vitals/labs/studies/notes all reviewed  Exam independently verified with additional comments or findings as noted:  Ext: Minimal lower extremity edema    Assessment:  Acute kidney injury/ATN  Chronic kidney disease stage IIIb  Hypertension  Diabetes type 2  Diabetic foot ulcer with sepsis  Anemia chronic kidney disease  Obesity  Metabolic acidosis    Plan:  Discussed with patient, nursing  Workup reviewed today  Monitor labs  Wean IV fluids  ID evaluation reviewed   Antibiotics per ID-finishing ertapenem on 8/7    Willy Artaega MD  8/7/2024  17:15 CDT        This documentation may indicate a split shared visit as defined in CMS Final rule (CY) 2024 Physician Fee Schedule dated 11/2/2023: for Medicare billing purposes, the “substantive portion” means more than half of the total time spent by the physician or nonphysician practitioner performing the split (or shared) visit, or a substantive part of the medical decision making.  This visit involved face to face encounters with the patient by both providers on the date of the visit.  Time spent is indicated to identify the substantive portion of the visit, whereas the level of service may be determined by complexity of medical decision making.                      Nephrology ([facility]) Progress Note      Patient:  Erick Luong  YOB: 1955  Date of Service: 8/7/2024  MRN: [id number]   Acct: [id number]   Primary Care Physician: Del Shetty MD  Advance Directive:   Code Status and Medical Interventions: CPR (Attempt to Resuscitate); Full Support   Ordered at: 08/01/24 0053     Level Of Support Discussed With:    Patient     Code Status (Patient has no pulse and is not breathing):    CPR (Attempt to Resuscitate)     Medical Interventions (Patient has pulse or is breathing):    Full Support     Admit Date: 7/31/2024       Hospital Day: 7  Referring Provider: Abebe Garzon DO      Patient personally seen and examined.  Complete chart including Consults, Notes, Operative Reports, Labs, Cardiology, and Radiology studies reviewed as able.        Subjective:  Erick Luong is a 69 y.o. male for whom we were consulted for evaluation and treatment of acute kidney injury. Baseline chronic kidney disease stage 3b. Baseline creatinine approximately 1.5-1.8. Follows in our office.  History of poorly controlled type 2 diabetes, hypertension, coronary artery disease, diabetic foot ulcer, obesity.  On 7/31 patient was found wandering at home, confused. Brought to ER for evaluation. Has a nonhealing left foot diabetic ulcer with serosanguineous drainage. Blood glucose level was >700 with A1c >12%. Initial creatinine was 2.67 and has steadily improved since he was admitted. Nephrology consulted on 8/01. Hospital course remarkable for improving renal function and improved blood glucose levels. Moved to medical floor    Today is awake and alert. No new complaints or new overnight issues. Urine output nonoliguric    Allergies:  Cefepime, Bactrim [sulfamethoxazole-trimethoprim], Vancomycin, Zolpidem, and Metronidazole    Home Meds:  Medications Prior to Admission   Medication Sig Dispense Refill Last Dose    ascorbic acid (VITAMIN C) 1000 MG tablet Take 1 tablet by  mouth Daily. 30 tablet 3     bumetanide (BUMEX) 1 MG tablet Take 1 tablet by mouth 2 (Two) Times a Day for 30 days. 60 tablet 0     busPIRone (BUSPAR) 10 MG tablet Take 1 tablet by mouth 2 (Two) Times a Day.       calcitriol (ROCALTROL) 0.5 MCG capsule Take 1 capsule by mouth Daily. 90 capsule 4     carvedilol (COREG) 3.125 MG tablet Take 1 tablet twice a day by oral route. 60 tablet 4     donepezil (ARICEPT) 10 MG tablet Take 1 tablet by mouth Daily. 90 tablet 1     DULoxetine (CYMBALTA) 60 MG capsule Take 1 capsule by mouth Daily. 90 capsule 4     famotidine (PEPCID) 20 MG tablet Take 1 tablet twice a day by oral route. 180 tablet 4     Insulin Glargine (Lantus SoloStar) 100 UNIT/ML injection pen Inject 20 Units under the skin into the appropriate area as directed every night at bedtime. 15 mL 3     Insulin Regular Human, Conc, (HumuLIN R) 500 UNIT/ML solution pen-injector CONCENTRATED injection Inject 40 Units under the skin into the appropriate area as directed 2 (Two) Times a Day Before Meals. Inject 40 units under the skin in the the appropriate area with regular meals AND 40 units with large meals.       Iron-Vitamin C (Vitron-C)  MG tablet Take 1 tablet twice a day by oral route. 60 tablet 2     levocetirizine (XYZAL) 5 MG tablet Take 1 tablet by mouth every day at bedtime. 30 tablet 2     melatonin 3 MG tablet Take 2 tablets by mouth At Night As Needed for Sleep. 30 tablet 0     oxyCODONE (ROXICODONE) 10 MG tablet Take 1 tablet by mouth 2 (Two) Times a Day As Needed. Must last 30 days per md. 55 tablet 0     pantoprazole (PROTONIX) 40 MG EC tablet Take 1 tablet by mouth Every 12 (Twelve) Hours. 180 tablet 4     pregabalin (LYRICA) 100 MG capsule Take 1 capsule by mouth Daily.       pregabalin (LYRICA) 100 MG capsule Take 2 capsules by mouth every night at bedtime.       rosuvastatin (CRESTOR) 10 MG tablet Take 1 tablet by mouth Every Night. 30 tablet 2     sucralfate (CARAFATE) 1 g tablet Take 1  tablet by mouth 4 (Four) Times a Day before meals. 360 tablet 1     Diclofenac Sodium (VOLTAREN) 1 % gel gel Apply 2 g topically to the appropriate area as directed 4 (Four) Times a Day As Needed. 300 g 11     nitroglycerin (NITROSTAT) 0.4 MG SL tablet Place 1 tablet under the tongue Every 5 (Five) Minutes As Needed for Chest Pain. Take no more than 3 doses in 15 minutes.       polyethylene glycol (MIRALAX) 17 GM/SCOOP powder Take 17 g by mouth Daily As Needed (constipation).       sennosides-docusate (PERICOLACE) 8.6-50 MG per tablet Take 1 tablet by mouth Every Night. Obtain OTC          Medicines:  Current Facility-Administered Medications   Medication Dose Route Frequency Provider Last Rate Last Admin    ascorbic acid (VITAMIN C) tablet 1,000 mg  1,000 mg Oral Daily Reuben Garza APRN   1,000 mg at 08/06/24 0958    sennosides-docusate (PERICOLACE) 8.6-50 MG per tablet 2 tablet  2 tablet Oral BID Lamine Figueroa APRN   2 tablet at 08/06/24 2050    And    polyethylene glycol (MIRALAX) packet 17 g  17 g Oral Daily PRN Lamine Figueroa APRN        And    bisacodyl (DULCOLAX) EC tablet 5 mg  5 mg Oral Daily PRN Lamine Figueroa APRN        And    bisacodyl (DULCOLAX) suppository 10 mg  10 mg Rectal Daily PRN Lamine Figueroa APRN   10 mg at 08/02/24 0941    Calcium Replacement - Follow Nurse / BPA Driven Protocol   Does not apply PRN Abebe Garzon DO        dextrose (D50W) (25 g/50 mL) IV injection 10-50 mL  10-50 mL Intravenous Q15 Min PRN Abebe Garzon DO        dextrose (D50W) (25 g/50 mL) IV injection 25 g  25 g Intravenous Q15 Min PRN Lamine Figueroa APRN        dextrose (GLUTOSE) oral gel 15 g  15 g Oral Q15 Min PRN Lamine Figueroa APRN        donepezil (ARICEPT) tablet 10 mg  10 mg Oral Daily Reuben Garza APRN   10 mg at 08/06/24 0958    DULoxetine (CYMBALTA) DR capsule 60 mg  60 mg Oral Daily Reuben Garza APRN   60 mg at 08/06/24 0958    Enoxaparin Sodium (LOVENOX) syringe 135 mg   1 mg/kg Subcutaneous Q12H Abebe Garzon DO   135 mg at 08/06/24 2050    ertapenem (INVanz) 1,000 mg in sodium chloride 0.9 % 100 mL MBP  1,000 mg Intravenous Q24H Julia Adrian  mL/hr at 08/06/24 1516 1,000 mg at 08/06/24 1516    famotidine (PEPCID) tablet 20 mg  20 mg Oral Q12H Reuben Garza APRN   20 mg at 08/06/24 2050    ferric gluconate (FERRLECIT) 250 mg in sodium chloride 0.9 % 250 mL IVPB  250 mg Intravenous Daily Brian Lomas  mL/hr at 08/06/24 1213 250 mg at 08/06/24 1213    glucagon (GLUCAGEN) injection 1 mg  1 mg Intramuscular Q15 Min PRN Lamine Figueroa APRN        insulin glargine (LANTUS, SEMGLEE) injection 10 Units  10 Units Subcutaneous Daily Keren Jamison MD   10 Units at 08/06/24 0958    insulin regular (humuLIN R,novoLIN R) injection 3-14 Units  3-14 Units Subcutaneous 4x Daily AC & at Bedtime Keren Jamison MD   3 Units at 08/03/24 2033    Magnesium Standard Dose Replacement - Follow Nurse / BPA Driven Protocol   Does not apply PRN Abebe Garzon DO        melatonin tablet 2.5 mg  2.5 mg Oral Nightly Reuben Garza APRN   2.5 mg at 08/06/24 2050    nitroglycerin (NITROSTAT) SL tablet 0.4 mg  0.4 mg Sublingual Q5 Min PRN Lamine Figueroa APRN        ondansetron (ZOFRAN) injection 4 mg  4 mg Intravenous Q6H PRN Payam Keyes DO   4 mg at 08/05/24 0800    oxyCODONE (ROXICODONE) immediate release tablet 10 mg  10 mg Oral BID Alejandrina Barnett DO   10 mg at 08/06/24 2049    pantoprazole (PROTONIX) EC tablet 40 mg  40 mg Oral Q12H Reuben Garza APRN   40 mg at 08/06/24 2050    Pharmacy to Dose enoxaparin (LOVENOX)   Does not apply Continuous PRN Abebe Garzon DO        Phosphorus Replacement - Follow Nurse / BPA Driven Protocol   Does not apply PRN Abebe Garzon DO        polyethylene glycol (MIRALAX) packet 17 g  17 g Oral Daily Reuben Garza APRN   17 g at 08/06/24 0959    Potassium Replacement - Follow Nurse / BPA Driven  Protocol   Does not apply PRN Abebe Garzon DO        pregabalin (LYRICA) capsule 100 mg  100 mg Oral Nightly Reuben Garza APRN   100 mg at 08/06/24 2050    pregabalin (LYRICA) capsule 50 mg  50 mg Oral Daily Reuben Garza APRN   50 mg at 08/06/24 0958    rosuvastatin (CRESTOR) tablet 10 mg  10 mg Oral Nightly Reuben Garza APRN   10 mg at 08/06/24 2050    sodium bicarbonate tablet 650 mg  650 mg Oral TID Brian Lomas MD   650 mg at 08/06/24 2050    sodium chloride 0.9 % flush 10 mL  10 mL Intravenous Q12H Abebe Garzon DO   10 mL at 08/06/24 2050    sodium chloride 0.9 % flush 10 mL  10 mL Intravenous PRN Abebe Garzon,         sodium chloride 0.9 % flush 10 mL  10 mL Intravenous Q12H Lamine Figueroa APRN   10 mL at 08/06/24 2050    sodium chloride 0.9 % flush 10 mL  10 mL Intravenous PRN Lamine Figueroa APRN        sodium chloride 0.9 % infusion 40 mL  40 mL Intravenous PRN Abebe Garzon DO        sodium chloride 0.9 % infusion 40 mL  40 mL Intravenous PRN Lamine Figueroa APRN        sodium hypochlorite (DAKIN'S 1/4 STRENGTH) 0.125 % topical solution 0.125% solution   Topical Q12H Aby High APRN   Given at 08/06/24 1000       Past Medical History:  Past Medical History:   Diagnosis Date    Arthritis     Autonomic disease     CAD (coronary artery disease) 02/06/2017    Cervical radiculopathy 09/16/2021    Chronic constipation with acute exaccerbation 05/10/2021    Coronary artery disease     Degeneration of cervical intervertebral disc 08/11/2021    Diabetes mellitus     Diabetic foot ulcer 08/31/2020    Diabetic polyneuropathy associated with type 2 diabetes mellitus 01/18/2021    Elevated cholesterol     Gastroesophageal reflux disease 05/13/2019    Headache     HTN (hypertension), benign 05/03/2017    Hyperlipidemia     Hypertension     Mixed hyperlipidemia 02/07/2017    Multiple lung nodules 01/26/2020    5mm, 9 mm RLL identified 1/2020, not present 10/2019.     Myocardial infarction     Osteomyelitis 01/22/2020    Osteomyelitis of fifth toe of right foot 10/07/2019    Pancreatitis     Persistent insomnia 01/20/2020    Renal disorder     Sleep apnea 02/06/2017    Sleep apnea with use of continuous positive airway pressure (CPAP)     NON-COMPLIANT    Slow transit constipation 01/16/2019    Spinal stenosis in cervical region 09/16/2021    Vitamin D deficiency 03/02/2021       Past Surgical History:  Past Surgical History:   Procedure Laterality Date    ABDOMINAL SURGERY      AMPUTATION FOOT / TOE Left 10/2021    5th digit     ANTERIOR CERVICAL DISCECTOMY W/ FUSION N/A 08/05/2022    Procedure: CERVICAL DISCECTOMY ANTERIOR WITH FUSION C5-6 with possible plating of C5-7 with neuromonitoring and 1 c-arm;  Surgeon: Karel Soliz MD;  Location: [facility] OR;  Service: Neurosurgery;  Laterality: N/A;    APPENDECTOMY      BACK SURGERY      CARDIAC CATHETERIZATION Left 02/08/2021    Procedure: Left Heart Cath w poss intervention left anatomical snuff box acess;  Surgeon: Omkar Charles DO;  Location:  [facility]ATH [facility]AS[facility]ATION;  Service: Cardiology;  Laterality: Left;    CARDIAC SURGERY      CATARACT EXTRACTION      CERVICAL SPINE SURGERY      COLONOSCOPY N/A 01/31/2017    Normal exam repeat in 5 years    COLONOSCOPY N/A 02/11/2019    Mild acute inflammation    COLONOSCOPY N/A 04/07/2024    2 areas at 10 and 20 cm with friability ulceration 2 clips placed at 20 cm and 4 clips at 10 cm poor prep normal mucosa,mild eroisions and ulcerations in visible vessels    COLONOSCOPY N/A 7/1/2024    Procedure: COLONOSCOPY WITH ANESTHESIA;  Surgeon: Arsalan Lorenzo DO;  Location:  [facility] ENDOSCOPY;  Service: Gastroenterology;  Laterality: N/A;  pre op constipation/diarrhea  post poor prep  pcp Del Shetty    COLONOSCOPY N/A 7/2/2024    Procedure: COLONOSCOPY WITH ANESTHESIA;  Surgeon: Agapito Christopher MD;  Location:  [facility];  Service: Gastroenterology;  Laterality:  N/A;  pre rectal bleeding  post poor prep  pcp Del Shetty MD    COLONOSCOPY W/ POLYPECTOMY  2014    Hyperplastic polyp    CORONARY ARTERY BYPASS GRAFT  10/2015    ENDOSCOPY  2011    Gastritis with hemorrhage    ENDOSCOPY N/A 2017    Normal exam    ENDOSCOPY N/A 2019    Gastritis    ENDOSCOPY N/A 2020    Non-erosive gastritis with hemorrhage    ENDOSCOPY N/A 02/10/2021    Esophagitis    ENDOSCOPY N/A 2024    There were esophageal mucosal changes suspicious for short-segment Low's esophagus present in the distal esophagus. The maximum longitudinal extent of these mucosal changes was 2 cm in length. Mucosa was biopsied with a cold forceps for histologyDistal esophagus, biopsies: Mild chronic active esophagogastritis. No evidence of intestinal metaplasia, dysplasia. Antrum, bx, Mild chronic gastritis    FOOT SURGERY Left     INCISION AND DRAINAGE OF WOUND Left 2007    spider bite       Family History  Family History   Problem Relation Age of Onset    Colon cancer Father     Heart disease Father     Colon cancer Sister     Colon polyps Sister     Alzheimer's disease Mother     Coronary artery disease Sister     Coronary artery disease Sister        Social History  Social History     Socioeconomic History    Marital status:    Tobacco Use    Smoking status: Former     Current packs/day: 0.00     Types: Cigarettes     Quit date:      Years since quittin.6    Smokeless tobacco: Never    Tobacco comments:     smoked in [employer] [place]   Vaping Use    Vaping status: Never Used   Substance and Sexual Activity    Alcohol use: No    Drug use: No    Sexual activity: Defer       Review of Systems:  History obtained from chart review and the patient  General ROS: No fever or chills  Respiratory ROS: No cough, shortness of breath, wheezing  Cardiovascular ROS: No chest pain or palpitations  Gastrointestinal ROS: No abdominal pain or melena  Genito-Urinary ROS: No dysuria  or hematuria  Psych ROS: No anxiety and depression  14 point ROS reviewed with the patient and negative except as noted above and in the HPI unless unable to obtain.    Objective:  Patient Vitals for the past 24 hrs:   BP Temp Temp src Pulse Resp SpO2 Weight   08/07/24 0837 146/76 98.6 °F (37 °C) Oral 74 16 96 % --   08/07/24 0525 -- -- -- -- -- -- 133 kg (293 lb 3.2 oz)   08/07/24 0315 139/77 98.2 °F (36.8 °C) Oral 67 16 95 % --   08/06/24 1930 142/61 98.3 °F (36.8 °C) Oral 67 16 96 % --   08/06/24 1616 161/71 98.1 °F (36.7 °C) Oral 97 18 97 % --   08/06/24 1151 169/95 98.5 °F (36.9 °C) Oral 74 17 98 % --     No intake or output data in the 24 hours ending 08/07/24 1007    General: awake/alert   Chest:  clear to auscultation bilaterally without respiratory distress  CVS: regular rate and rhythm  Abdominal: soft, nontender, positive bowel sounds  Extremities: no cyanosis or edema  Skin:  left foot ulcer and warm and dry without rash      Labs:  Results from last 7 days   Lab Units 08/05/24  0444 08/04/24  0615 08/03/24  0605   WBC 10*3/mm3 5.89 5.73 5.75   HEMOGLOBIN g/dL 9.5* 9.5* 10.3*   HEMATOCRIT % 30.1* 30.5* 33.2*   PLATELETS 10*3/mm3 210 201 215         Results from last 7 days   Lab Units 08/07/24  0758 08/06/24  0508 08/05/24  0444 08/04/24  0615 08/03/24  0605   SODIUM mmol/L 144 143 144 145 143   POTASSIUM mmol/L 3.9 4.1 3.9 3.9 3.8   CHLORIDE mmol/L 110* 109* 112* 111* 110*   CO2 mmol/L 20.0* 22.0 21.0* 21.0* 19.0*   BUN mg/dL 15 18 20 24* 31*   CREATININE mg/dL 1.50* 1.47* 1.47* 1.60* 1.82*   CALCIUM mg/dL 8.6 8.4* 8.3* 8.5* 8.3*   EGFR mL/min/1.73 50.4* 51.6* 51.6* 46.6* 40.0*   BILIRUBIN mg/dL  --   --  0.3 0.3 0.3   ALK PHOS U/L  --   --  102 95 88   ALT (SGPT) U/L  --   --  9 9 7   AST (SGOT) U/L  --   --  16 15 12   GLUCOSE mg/dL 97 101* 89 97 125*       Radiology:   Imaging Results (Last 72 Hours)       ** No results found for the last 72 hours. **            Culture:  Blood Culture   Date Value  Ref Range Status   07/31/2024 No growth at 24 hours  Preliminary   07/31/2024 Staphylococcus, coagulase negative (C)  Final         Assessment    Acute kidney injury, ATN--resolving  Baseline chronic kidney disease stage 3b  Type 2 diabetes  Hypertension  Sepsis related to diabetic foot ulcer  Anemia of CKD  Obesity     Plan:   Encourage compliance with medications and diabetic diet  Renal function stable   Monitor labs      Stewart Alvarez, SUSAN  8/7/2024  10:07 CDT      Electronically signed by Willy Arteaga MD at 08/07/24 7937

## 2024-08-09 NOTE — CASE MANAGEMENT/SOCIAL WORK
Continued Stay Note  [facility]     Patient Name: Erick Luong  MRN: [id number]  Today's Date: 8/9/2024    Admit Date: 7/31/2024    Plan: [facility]   Discharge Plan       Row Name 08/09/24 0858       Plan    Plan [facility]    Plan Comments [facility] is only accepting facility. Lashell in admissions at [facility] confirmed she will start precert today. YIMI faxed updates to Lashell at [phone]. Will follow for approval.           HAFSA Vaz

## 2024-08-09 NOTE — PROGRESS NOTES
[facility] Medicine Services  INPATIENT PROGRESS NOTE    Patient Name: Erick Luong  Date of Admission: 7/31/2024  Today's Date: 08/09/24  Length of Stay: 9  Primary Care Physician: Del Shetty MD    Subjective   Chief Complaint: diabetic foot ulcer.   HPI   Sitting up on bed at time of examination.  He is more alert today and conversant.  He has no new complaints.  He is aware that we are waiting for nursing home placement for continued wound care.      Review of Systems   All pertinent negatives and positives are as above. All other systems have been reviewed and are negative unless otherwise stated.     Objective    Temp:  [97.3 °F (36.3 °C)-98.3 °F (36.8 °C)] 97.6 °F (36.4 °C)  Heart Rate:  [70-81] 80  Resp:  [16] 16  BP: (139-152)/(64-87) 149/87  Physical Exam  Vitals and nursing note reviewed.   Constitutional:       Appearance: He is obese.   HENT:      Head: Normocephalic and atraumatic.      Right Ear: External ear normal.      Left Ear: External ear normal.      Nose: Nose normal.      Mouth/Throat:      Mouth: Mucous membranes are moist.   Eyes:      Extraocular Movements: Extraocular movements intact.      Conjunctiva/sclera: Conjunctivae normal.      Pupils: Pupils are equal, round, and reactive to light.   Cardiovascular:      Rate and Rhythm: Normal rate and regular rhythm.      Pulses: Normal pulses.      Heart sounds: Normal heart sounds.   Pulmonary:      Effort: Pulmonary effort is normal.   Abdominal:      General: Bowel sounds are normal.      Palpations: Abdomen is soft.   Musculoskeletal:      Cervical back: Normal range of motion.      Comments: Moves all extremities   Skin:     General: Skin is warm and dry.      Capillary Refill: Capillary refill takes less than 2 seconds.      Comments: Dressing left foot/heel intact.   Neurological:      Mental Status: He is alert and oriented to person, place, and time.   Psychiatric:      Comments: Affect  is  flat.             File Link    Scan on 8/7/2024 1702 by Aicha Smallwood RN: Wound Left lateral foot Diabetic Ulcer        Key Information    Document ID File Type Document Type Description   [id number] Image WOUND IMAGE Wound Left lateral foot Diabetic Ulcer     Import Information    Attached At Date Time User Dept   Encounter Level 8/7/2024  5:02 PM iAcha Smallwood RN [facility]     Encounter    Hospital Encounter on 7/31/24             Results Review:  I have reviewed the labs, radiology results, and diagnostic studies.    Laboratory Data:   Results from last 7 days   Lab Units 08/09/24  0518 08/08/24  0449 08/05/24  0444   WBC 10*3/mm3 7.27 8.08 5.89   HEMOGLOBIN g/dL 9.9* 9.6* 9.5*   HEMATOCRIT % 31.6* 30.5* 30.1*   PLATELETS 10*3/mm3 247 242 210        Results from last 7 days   Lab Units 08/09/24  0518 08/08/24  0449 08/07/24  0758 08/06/24  0508 08/05/24  0444 08/04/24  0615 08/03/24  0605   SODIUM mmol/L 141 141 144   < > 144 145 143   POTASSIUM mmol/L 4.1 3.8 3.9   < > 3.9 3.9 3.8   CHLORIDE mmol/L 110* 109* 110*   < > 112* 111* 110*   CO2 mmol/L 22.0 21.0* 20.0*   < > 21.0* 21.0* 19.0*   BUN mg/dL 15 15 15   < > 20 24* 31*   CREATININE mg/dL 1.34* 1.42* 1.50*   < > 1.47* 1.60* 1.82*   CALCIUM mg/dL 8.4* 8.5* 8.6   < > 8.3* 8.5* 8.3*   BILIRUBIN mg/dL  --   --   --   --  0.3 0.3 0.3   ALK PHOS U/L  --   --   --   --  102 95 88   ALT (SGPT) U/L  --   --   --   --  9 9 7   AST (SGOT) U/L  --   --   --   --  16 15 12   GLUCOSE mg/dL 108* 110* 97   < > 89 97 125*    < > = values in this interval not displayed.       Culture Data:   Blood Culture   Date Value Ref Range Status   07/31/2024 No growth at 5 days  Final   07/31/2024 Staphylococcus, coagulase negative (C)  Final     Urine Culture   Date Value Ref Range Status   07/31/2024 >100,000 CFU/mL Escherichia coli ESBL (A)  Final     Wound Culture   Date Value Ref Range Status   08/02/2024 (A)  Final    Light growth (2+) Streptococcus  agalactiae (Group B)     Comment:       This organism is considered to be universally susceptible to penicillin.  No further antibiotic testing will be performed. If Clindamycin or Erythromycin is the drug of choice, notify the laboratory within 7 days to request susceptibility testing.   08/02/2024 (A)  Final    Light growth (2+) Streptococcus agalactiae (Group B)     Comment:       This organism is considered to be universally susceptible to penicillin.  No further antibiotic testing will be performed. If Clindamycin or Erythromycin is the drug of choice, notify the laboratory within 7 days to request susceptibility testing.       Radiology Data:   Imaging Results (Last 24 Hours)       ** No results found for the last 24 hours. **            I have reviewed the patient's current medications.     Assessment/Plan   Assessment  Active Hospital Problems    Diagnosis     **DKA, type 2, not at goal     E. coli UTI, ESBL     Atrial fibrillation     Chronic heart failure with preserved ejection fraction (HFpEF)     Chronic diastolic heart failure     GERD without esophagitis     Diabetic ulcer of left foot associated with type 2 diabetes mellitus        Treatment Plan  Continue to await nursing home placement.  Currently waiting on pre-CERT.  Continue current treatment of foot wound.  Continue current diabetic management.  Currently he is very well-controlled with medicine and diet.     Medical Decision Making  Number and Complexity of problems:   3 acute, high complexity problems  4+ chronic, moderate complexity problems     Differential Diagnosis: None     Conditions and Status        Condition is improving.     [facility] Data  External documents reviewed: Care Everywhere  Cardiac tracing (EKG, telemetry) interpretation: See HPI  Radiology interpretation: See HPI  Labs reviewed: See HPI  Any tests that were considered but not ordered: None     Decision rules/scores evaluated (example [id number], Wells, etc): None      Discussed with: The patient     Care Planning  Shared decision making: Patient  Code status and discussions: Full code     Disposition  Social Determinants of Health that impact treatment or disposition: none     I expect the patient to be discharged to SNF  in 1-2 days.    Electronically signed by Alejandrina Barnett DO, 08/09/24, 14:27 CDT.

## 2024-08-09 NOTE — PLAN OF CARE
Problem: Adult Inpatient Plan of Care  Goal: Plan of Care Review  Recent Flowsheet Documentation  Taken 8/9/2024 1500 by Thien Conner, OTR/L  Progress: no change  Plan of Care Reviewed With: patient  Outcome Evaluation: OT tx completed. Pt was asleep but easy to awaken. Pt agreeable to therapy. Pt reports pain L foot 5/10 pre tx, 8/10 with activity. Pt reports pain is improving post tx. Pt was max A for LB dressing task. Pt was CGA for sit to stand t/f and functional mobility with RW for bed to ruffin to chair. Pt completes BUE AROM HEP in all planes except pt requests no shoulder flexion/extension d/t chronic pain in L shoulder. 20 reps x1 set, 3lb dowel bar utilized. Continue OT POC.

## 2024-08-09 NOTE — PROGRESS NOTES
[facility] - PODIATRY    Today's Date: 08/09/24     Patient Name: Erick Luong  MRN: [id number]  CSN: [id number]  PCP: Del Shetty MD  Referring Provider: Abebe Garzon DO  Attending Provider: Alejandrina Barnett DO  Length of Stay: 9    SUBJECTIVE   Chief Complaint: Left foot wounds    HPI: Erick Luong, a 69 y.o.male, who is being followed by podiatry for left foot wounds.  Patient reports nursing has been changing his dressing regularly.  Relays that his pain is 7/10 currently to his left foot. States that he will likely be discharged today to a skilled nursing facility. Denies significant overnight events.    Past Medical History:   Diagnosis Date    Arthritis     Autonomic disease     CAD (coronary artery disease) 02/06/2017    Cervical radiculopathy 09/16/2021    Chronic constipation with acute exaccerbation 05/10/2021    Coronary artery disease     Degeneration of cervical intervertebral disc 08/11/2021    Diabetes mellitus     Diabetic foot ulcer 08/31/2020    Diabetic polyneuropathy associated with type 2 diabetes mellitus 01/18/2021    Elevated cholesterol     Gastroesophageal reflux disease 05/13/2019    Headache     HTN (hypertension), benign 05/03/2017    Hyperlipidemia     Hypertension     Mixed hyperlipidemia 02/07/2017    Multiple lung nodules 01/26/2020    5mm, 9 mm RLL identified 1/2020, not present 10/2019.    Myocardial infarction     Osteomyelitis 01/22/2020    Osteomyelitis of fifth toe of right foot 10/07/2019    Pancreatitis     Persistent insomnia 01/20/2020    Renal disorder     Sleep apnea 02/06/2017    Sleep apnea with use of continuous positive airway pressure (CPAP)     NON-COMPLIANT    Slow transit constipation 01/16/2019    Spinal stenosis in cervical region 09/16/2021    Vitamin D deficiency 03/02/2021     Past Surgical History:   Procedure Laterality Date    ABDOMINAL SURGERY      AMPUTATION FOOT / TOE Left 10/2021    5th digit     ANTERIOR CERVICAL  DISCECTOMY W/ FUSION N/A 08/05/2022    Procedure: CERVICAL DISCECTOMY ANTERIOR WITH FUSION C5-6 with possible plating of C5-7 with neuromonitoring and 1 c-arm;  Surgeon: Karel Soliz MD;  Location: [facility] OR;  Service: Neurosurgery;  Laterality: N/A;    APPENDECTOMY      BACK SURGERY      CARDIAC CATHETERIZATION Left 02/08/2021    Procedure: Left Heart Cath w poss intervention left anatomical snuff box acess;  Surgeon: Omkar Charles DO;  Location: [facility] CATH INVASIVE LOCATION;  Service: Cardiology;  Laterality: Left;    CARDIAC SURGERY      CATARACT EXTRACTION      CERVICAL SPINE SURGERY      COLONOSCOPY N/A 01/31/2017    Normal exam repeat in 5 years    COLONOSCOPY N/A 02/11/2019    Mild acute inflammation    COLONOSCOPY N/A 04/07/2024    2 areas at 10 and 20 cm with friability ulceration 2 clips placed at 20 cm and 4 clips at 10 cm poor prep normal mucosa,mild eroisions and ulcerations in visible vessels    COLONOSCOPY N/A 7/1/2024    Procedure: COLONOSCOPY WITH ANESTHESIA;  Surgeon: Arsalan Lorenzo DO;  Location: [facility] ENDOSCOPY;  Service: Gastroenterology;  Laterality: N/A;  pre op constipation/diarrhea  post poor prep  pcp Del Shetty    COLONOSCOPY N/A 7/2/2024    Procedure: COLONOSCOPY WITH ANESTHESIA;  Surgeon: Agapito Christopher MD;  Location: [facility] ENDOSCOPY;  Service: Gastroenterology;  Laterality: N/A;  pre rectal bleeding  post poor prep  pcp Del Shetty MD    COLONOSCOPY W/ POLYPECTOMY  03/04/2014    Hyperplastic polyp    CORONARY ARTERY BYPASS GRAFT  10/2015    ENDOSCOPY  04/13/2011    Gastritis with hemorrhage    ENDOSCOPY N/A 05/05/2017    Normal exam    ENDOSCOPY N/A 02/11/2019    Gastritis    ENDOSCOPY N/A 09/01/2020    Non-erosive gastritis with hemorrhage    ENDOSCOPY N/A 02/10/2021    Esophagitis    ENDOSCOPY N/A 04/11/2024    There were esophageal mucosal changes suspicious for short-segment Low's esophagus present in the distal esophagus. The maximum  "longitudinal extent of these mucosal changes was 2 cm in length. Mucosa was biopsied with a cold forceps for histologyDistal esophagus, biopsies: Mild chronic active esophagogastritis. No evidence of intestinal metaplasia, dysplasia. Antrum, bx, Mild chronic gastritis    FOOT SURGERY Left     INCISION AND DRAINAGE OF WOUND Left 2007    spider bite     Family History   Problem Relation Age of Onset    Colon cancer Father     Heart disease Father     Colon cancer Sister     Colon polyps Sister     Alzheimer's disease Mother     Coronary artery disease Sister     Coronary artery disease Sister      Social History     Socioeconomic History    Marital status:    Tobacco Use    Smoking status: Former     Current packs/day: 0.00     Types: Cigarettes     Quit date:      Years since quittin.6    Smokeless tobacco: Never    Tobacco comments:     smoked in [place]   Vaping Use    Vaping status: Never Used   Substance and Sexual Activity    Alcohol use: No    Drug use: No    Sexual activity: Defer     Allergies   Allergen Reactions    Cefepime Hives and Anaphylaxis    Bactrim [Sulfamethoxazole-Trimethoprim] Other (See Comments)     \"RENAL FAILURE\"    Vancomycin Itching    Zolpidem Mental Status Change     \"makes him crazy\"    Metronidazole Rash     Current Facility-Administered Medications   Medication Dose Route Frequency Provider Last Rate Last Admin    ascorbic acid (VITAMIN C) tablet 1,000 mg  1,000 mg Oral Daily Reuben Garza APRN   1,000 mg at 24    sennosides-docusate (PERICOLACE) 8.6-50 MG per tablet 2 tablet  2 tablet Oral BID Lamine Figueroa APRN   2 tablet at 24    And    polyethylene glycol (MIRALAX) packet 17 g  17 g Oral Daily PRN Lamine Figueroa APRN        And    bisacodyl (DULCOLAX) EC tablet 5 mg  5 mg Oral Daily PRN Lamine Figueroa APRN        And    bisacodyl (DULCOLAX) suppository 10 mg  10 mg Rectal Daily PRN Lamine Figueroa APRN   10 mg at 24 0941 "    Calcium Replacement - Follow Nurse / BPA Driven Protocol   Does not apply PRN Abebe Garzon DO        dextrose (D50W) (25 g/50 mL) IV injection 10-50 mL  10-50 mL Intravenous Q15 Min PRN Aebbe Garzon DO        dextrose (D50W) (25 g/50 mL) IV injection 25 g  25 g Intravenous Q15 Min PRN Lamine Figueroa APRN        dextrose (GLUTOSE) oral gel 15 g  15 g Oral Q15 Min PRN Lamine Figueroa APRN        donepezil (ARICEPT) tablet 10 mg  10 mg Oral Daily Reuben Garza APRN   10 mg at 08/09/24 0903    DULoxetine (CYMBALTA) DR capsule 60 mg  60 mg Oral Daily Reuben Garza APRN   60 mg at 08/09/24 0903    Enoxaparin Sodium (LOVENOX) syringe 135 mg  1 mg/kg Subcutaneous Q12H Abebe Garzon DO   135 mg at 08/09/24 0904    famotidine (PEPCID) tablet 20 mg  20 mg Oral Q12H Reuben Garza APRN   20 mg at 08/09/24 0903    glucagon (GLUCAGEN) injection 1 mg  1 mg Intramuscular Q15 Min PRN Lamine Figueroa APRN        insulin glargine (LANTUS, SEMGLEE) injection 10 Units  10 Units Subcutaneous Daily Keren Jamison MD   10 Units at 08/09/24 0904    insulin regular (humuLIN R,novoLIN R) injection 3-14 Units  3-14 Units Subcutaneous 4x Daily AC & at Bedtime Keren Jamison MD   3 Units at 08/08/24 1804    Magnesium Standard Dose Replacement - Follow Nurse / BPA Driven Protocol   Does not apply PRN Abebe Garzon DO        melatonin tablet 2.5 mg  2.5 mg Oral Nightly Reuben Garza APRN   2.5 mg at 08/08/24 2022    nitroglycerin (NITROSTAT) SL tablet 0.4 mg  0.4 mg Sublingual Q5 Min PRN Lamine Figueroa APRN        ondansetron (ZOFRAN) injection 4 mg  4 mg Intravenous Q6H PRN Payam Keyes DO   4 mg at 08/08/24 0900    oxyCODONE (ROXICODONE) immediate release tablet 10 mg  10 mg Oral BID Lamine Figueroa APRN   10 mg at 08/09/24 0904    pantoprazole (PROTONIX) EC tablet 40 mg  40 mg Oral Q12H Reuben Garza APRN   40 mg at 08/09/24 0904    Pharmacy to Dose enoxaparin (LOVENOX)   Does  not apply Continuous PRN Abebe Garzon DO        Phosphorus Replacement - Follow Nurse / BPA Driven Protocol   Does not apply PRN Abebe Garzon DO        polyethylene glycol (MIRALAX) packet 17 g  17 g Oral Daily Reuben Garza APRN   17 g at 08/09/24 0904    Potassium Replacement - Follow Nurse / BPA Driven Protocol   Does not apply PRN Abebe Garzon DO        pregabalin (LYRICA) capsule 100 mg  100 mg Oral Nightly Reuben Garza APRN   100 mg at 08/08/24 2022    pregabalin (LYRICA) capsule 50 mg  50 mg Oral Daily Reuben Garza APRN   50 mg at 08/09/24 0904    rosuvastatin (CRESTOR) tablet 10 mg  10 mg Oral Nightly Reuben Garza APRN   10 mg at 08/08/24 2022    sodium bicarbonate tablet 650 mg  650 mg Oral TID Brian Lomas MD   650 mg at 08/09/24 0903    sodium chloride 0.9 % flush 10 mL  10 mL Intravenous Q12H Abebe Garzon DO   10 mL at 08/08/24 2023    sodium chloride 0.9 % flush 10 mL  10 mL Intravenous PRN Abebe Garzon,         sodium chloride 0.9 % flush 10 mL  10 mL Intravenous Q12H Lamine Figueroa APRN   10 mL at 08/09/24 0905    sodium chloride 0.9 % flush 10 mL  10 mL Intravenous PRN Lamine Figueroa APRN        sodium chloride 0.9 % infusion 40 mL  40 mL Intravenous PRN Abebe Garzon,         sodium chloride 0.9 % infusion 40 mL  40 mL Intravenous PRN Lamine Figueroa APRN         Review of Systems   Constitutional:  Negative for activity change.   HENT:  Negative for congestion.    Respiratory:  Negative for apnea and shortness of breath.    Gastrointestinal:  Negative for abdominal pain.   Musculoskeletal:  Negative for arthralgias and back pain.        Foot pain   Skin:  Positive for wound.   Neurological:  Positive for numbness.   Psychiatric/Behavioral:  Positive for decreased concentration. Negative for agitation, behavioral problems and confusion.        OBJECTIVE     Vitals:    08/09/24 1118   BP: 149/87   Pulse: 80   Resp: 16   Temp: 97.6 °F  (36.4 °C)   SpO2: 100%       PHYSICAL EXAM  GEN:   Pt presents in hospital bed. Accompanied by none.     Foot/Ankle Exam    GENERAL  Appearance:  chronically ill  Affect:  unfocused  Right shoe gear: sock  Left shoe gear: sock    VASCULAR     Right Foot Vascularity   Dorsalis pedis:  2+  Posterior tibial:  2+  Skin temperature:  warm  Edema grading:  Trace  CFT:  3  Pedal hair growth:  Absent     Left Foot Vascularity   Dorsalis pedis:  2+  Posterior tibial:  2+  Skin temperature:  warm  Edema grading:  Trace  CFT:  3  Pedal hair growth:  Absent     NEUROLOGIC     Right Foot Neurologic   Light touch sensation: diminished  Vibratory sensation: diminished  Hot/Cold sensation: diminished     Left Foot Neurologic   Light touch sensation: diminished  Vibratory sensation: diminished  Hot/Cold sensation:  diminished    MUSCULOSKELETAL     Right Foot Musculoskeletal   Ecchymosis:  none  Tenderness:  none       Left Foot Musculoskeletal   Ecchymosis:  none  Tenderness:  lateral foot tenderness    MUSCLE STRENGTH     Right Foot Muscle Strength   Foot dorsiflexion:  5  Foot plantar flexion:  5  Foot inversion:  5  Foot eversion:  5     Left Foot Muscle Strength   Foot dorsiflexion:  4+  Foot plantar flexion:  4+  Foot inversion:  4+  Foot eversion:  4+    DERMATOLOGIC      Right Foot Dermatologic   Skin  Right foot skin is intact.      Left Foot Dermatologic   Skin  Positive for drainage, erythema and ulcer.      Left foot additional comments: Multiple areas of ulceration with serosanguineous drainage, erythema, and slight tenderness present. No malodor noticed.    See attached photos.               RADIOLOGY/NUCLEAR:  US Renal Bilateral    Result Date: 8/1/2024  Narrative: US RENAL BILATERAL- 8/1/2024 11:19 AM  HISTORY: Acute kidney injury; E11.628-Type 2 diabetes mellitus with other skin complications; L08.9-Local infection of the skin and subcutaneous tissue, unspecified; M86.9-Osteomyelitis, unspecified; E11.10-Type 2  diabetes mellitus with ketoacidosis without coma; R79.89-Other specified abnormal findings of blood chemistry; I48.91-Unspecified atrial fibrillation; R41.0-Disorientation, unspecified  COMPARISON: None available.   TECHNIQUE: Multiple longitudinal and transverse real-time sonographic images of the kidneys and urinary bladder are obtained. Report and images stored per institutional and state regulations.    FINDINGS:  Visualized proximal abdominal aorta and IVC are unremarkable.   RIGHT KIDNEY: Right kidney is 10.7 cm in length. Renal cortex is unremarkable. There is no hydronephrosis. There is an exophytic anechoic simple right renal cyst measuring 4.9 cm..  LEFT KIDNEY: Left kidney is 11.3 cm in length. Renal cortex is unremarkable. There is no left hydronephrosis.  PELVIS: Urinary bladder is unremarkable.       Impression:  1. Simple right renal cyst. 2. Otherwise unremarkable kidneys and no hydronephrosis.    This report was signed and finalized on 8/1/2024 1:02 PM by Eran Christina.      CT Abdomen Pelvis With Contrast    Result Date: 8/1/2024  Narrative: EXAMINATION: CT ABDOMEN PELVIS W CONTRAST-   7/31/2024 10:41 PM  HISTORY: abdominal distention with pain; M86.9-Osteomyelitis, unspecified; E11.10-Type 2 diabetes mellitus with ketoacidosis without coma; R79.89-Other specified abnormal findings of blood chemistry; I48.91-Unspecified atrial fibrillation; R41.0-Disorientation, unspecified  In order to have a CT radiation dose as low as reasonably achievable Automated Exposure Control was utilized for adjustment of the mA and/or KV according to patient size.  Total DLP = 1841.72 mGy.cm  The CT scan of the abdomen and pelvis is performed after intravenous contrast enhancement.  The images are acquired in axial plane and subsequent reconstruction in coronal and sagittal planes.  Comparison is made with the previous study dated 6/27/2024.  The lung bases included in the study show a trace right and small left  basal pleural effusion. There are mild atelectatic changes at bilateral bases left more than the right.  Limited visualized cardiomediastinal structures show atheromatous changes of the coronary arteries. There is moderate cardiomegaly.  The liver and spleen are normal.  The gallbladder is surgically absent.  Fatty infiltrated pancreas seen. No focal abnormality. No ductal dilatation. The adrenal glands are normal.  There is persistent bilateral significant perinephric fat infiltration and thickening of the pararenal fascia which is similar to the previous study. Bilateral nephrogram is normal and symmetrical. No calculi. No hydronephrosis. There is a well-defined sharply marginated low density exophytic mass from the lower pole of the right kidney measuring 4.4 cm in diameter. CT density suggest a cyst. Limited visualized ureters are normal and nondilated. The urinary bladder is well distended. No intrinsic abnormality.  Prostate is not significantly enlarged.  There are small fat-containing inguinal hernias, right larger than the left.  There is subcutaneous fat infiltration of the entire abdominal wall extending into the lower extremity. This may represent a fluid overload?.  The stomach is decompressed with moderate wall thickening. No focal abnormality Alamast. Duodenum is normal. Small bowel is nondistended and nondilated. Appendix is surgically absent. There is significant large volume stool throughout the colon. No finding to suggest obstruction.  Atheromatous changes of the abdominal aorta and iliac arteries. No aneurysmal dilatation.  Moderately prominent nonspecific retroperitoneal para-aortic lymph nodes predominantly in the mid abdomen. A referenced left para iliac lymph node, image #57 in series 2 and image #40 and series 3, measures 1.6 cm in short axis. There is a large lymph node in the left lower anterior pelvis/external iliac group measuring 1.3 cm in short axis. There are moderately prominent  inguinal lymph nodes. A left inguinal lymph node measures 2 cm in short axis.  Images reviewed in bone window show chronic degenerative changes of the lumbar spine. No acute bony abnormality.      Impression: 1. A significant perinephric fat stranding is similar to the previous study and is a nonspecific finding. Possibility for chronic inflammatory process/chronic pyelonephritis may not be excluded. Renal functions are normal and symmetrical. 2. Fatty infiltration of the pancreas. No mass. No ductal dilatation. 3. Nonspecific abdominal, pelvic and inguinal lymphadenopathy. The etiology and clinical significance is not certain. This appears moderately more progressive since the previous study. 4. Diffuse subcutaneous fat infiltration of the abdominal wall extending into the lower extremity may represent fluid overload?. 5. A significant large volume of stool in the colon without evidence of obstruction. This may represent constipation.  The above study was initially reviewed and reported by [employer]. I do not find any discrepancies.           This report was signed and finalized on 8/1/2024 7:50 AM by Dr. Carlos Cutler MD.      CT Head Without Contrast    Result Date: 8/1/2024  Narrative: EXAMINATION: CT HEAD WO CONTRAST-   7/31/2024 10:41 PM  HISTORY: altered mental status; M86.9-Osteomyelitis, unspecified; E11.10-Type 2 diabetes mellitus with ketoacidosis without coma; R79.89-Other specified abnormal findings of blood chemistry; I48.91-Unspecified atrial fibrillation; R41.0-Disorientation, unspecified  In order to have a CT radiation dose as low as reasonably achievable Automated Exposure Control was utilized for adjustment of the mA and/or KV according to patient size.  Total DLP = 783.72 mGy.cm  The CT scan of the head is performed without intravenous contrast enhancement.  The images are acquired in axial plane and subsequent reconstruction with coronal and sagittal planes.  Comparison is made with the  previous study dated 5/3/2024.  There is no evidence of a mass. There is no midline shift.  There is no evidence of intracranial hemorrhage or hematoma.  Moderately dilated ventricles, basal cisterns and the cortical sulci are similar to the previous study representing chronic volume loss.  Areas of chronic white matter ischemia bilaterally are noted. The gray-white matter differentiation is maintained.  Posterior fossa structures are normal.  The images reviewed in bone window show no acute displaced skull fracture. A subtle nondisplaced fracture or lesion may be obscured due to motion artifacts. Large mucous retention cyst is seen in the right maxillary antrum. The remaining paranasal sinuses and mastoid air cells are clear.      Impression: 1. No acute intracranial abnormality. 2. Chronic ischemic and atrophic changes. 3. Chronic maxillary sinusitis.  The above study was initially reviewed and reported by [employer]. I do not find any discrepancies.             This report was signed and finalized on 8/1/2024 5:27 AM by Dr. Carlos Cutler MD.      XR Foot 3+ View Left    Result Date: 7/31/2024  Narrative: EXAMINATION:  XR FOOT 3+ VW LEFT-  7/31/2024 6:22 PM  HISTORY: Heel wound.  COMPARISON: 3/26/2024.  TECHNIQUE: 3 views were obtained.  FINDINGS: There is a deep ulcer on the sole of the foot posteriorly. The ulcer appears to be packed with some type of radiopaque material. On the lateral image, there may be a small area of bony erosion involving the calcaneus just posterior to the plantar calcaneal spur. A small area of osteomyelitis is not ruled out. There has been prior amputation of the fifth toe and distal fifth metatarsal. There may have been prior resection of the distal fourth metacarpal and a portion of the proximal phalanx of the fourth toe versus chronic erosive change. The appearance is stable. There is severe narrowing of the first MTP joint. There is narrowing of some of the interphalangeal joints.  There is some spurring in the tarsal region and at the tarsal-metatarsal junction. There is soft tissue swelling of the foot diffusely. There is a small curvilinear foreign body in the soft tissues along the sole of the foot in the second toe region in the proximal phalanx area. There is another small linear foreign body in the soft tissues adjacent to the distal phalanx of the great toe.       Impression: 1. Deep ulcer on the sole of the foot posteriorly near the calcaneus. There is a questionable small area of bony erosion along the plantar surface of the calcaneus just proximal to the plantar calcaneal spur. Osteomyelitis cannot be ruled out. The soft tissue ulcer is packed with some type of radiopaque material. 2. Linear foreign bodies projected over the soft tissues of the second toe and first toe. Artifacts are also included in the differential. 3. Other chronic changes, as discussed.   This report was signed and finalized on 7/31/2024 7:47 PM by Dr. Raz Mendes MD.      XR Chest 1 View    Result Date: 7/31/2024  Narrative: EXAMINATION:  XR CHEST 1 VW-  7/31/2024 6:22 PM  HISTORY: Altered mental status. Hypertension and diabetes.  COMPARISON: 6/28/2024.  TECHNIQUE: Single view AP image.  FINDINGS: There is hypoventilation with vascular crowding. There is mild bronchial wall thickening, stable. There is no dense infiltrate or effusion. Heart size is borderline. Prior heart bypass surgery. Prior cervical fusion. No definite acute bony abnormality.       Impression: 1. Hypoventilation with vascular crowding. 2. Mild bronchial wall thickening, stable.    This report was signed and finalized on 7/31/2024 7:41 PM by Dr. Raz Mendes MD.       LABORATORY/CULTURE RESULTS:  Results from last 7 days   Lab Units 08/09/24  0518 08/08/24  0449 08/05/24  0444   WBC 10*3/mm3 7.27 8.08 5.89   HEMOGLOBIN g/dL 9.9* 9.6* 9.5*   HEMATOCRIT % 31.6* 30.5* 30.1*   PLATELETS 10*3/mm3 247 242 210     Results from last 7 days    Lab Units 08/09/24  0518 08/08/24  0449 08/07/24  0758 08/06/24  0508 08/05/24  0444 08/04/24  0615 08/03/24  0605   SODIUM mmol/L 141 141 144   < > 144 145 143   POTASSIUM mmol/L 4.1 3.8 3.9   < > 3.9 3.9 3.8   CHLORIDE mmol/L 110* 109* 110*   < > 112* 111* 110*   CO2 mmol/L 22.0 21.0* 20.0*   < > 21.0* 21.0* 19.0*   BUN mg/dL 15 15 15   < > 20 24* 31*   CREATININE mg/dL 1.34* 1.42* 1.50*   < > 1.47* 1.60* 1.82*   CALCIUM mg/dL 8.4* 8.5* 8.6   < > 8.3* 8.5* 8.3*   BILIRUBIN mg/dL  --   --   --   --  0.3 0.3 0.3   ALK PHOS U/L  --   --   --   --  102 95 88   ALT (SGPT) U/L  --   --   --   --  9 9 7   AST (SGOT) U/L  --   --   --   --  16 15 12   GLUCOSE mg/dL 108* 110* 97   < > 89 97 125*    < > = values in this interval not displayed.         Microbiology Results (last 10 days)       Procedure Component Value - Date/Time    Wound Culture - Drainage, Foot, Left [[id number]]  (Abnormal) Collected: 08/02/24 1338    Lab Status: Final result Specimen: Drainage from Foot, Left Updated: 08/04/24 0857     Wound Culture Light growth (2+) Streptococcus agalactiae (Group B)     Comment:   This organism is considered to be universally susceptible to penicillin.  No further antibiotic testing will be performed. If Clindamycin or Erythromycin is the drug of choice, notify the laboratory within 7 days to request susceptibility testing.        Gram Stain Rare (1+) WBCs seen      No organisms seen    Wound Culture - Wound, Foot, Left [[id number]]  (Abnormal) Collected: 08/02/24 1032    Lab Status: Final result Specimen: Wound from Foot, Left Updated: 08/04/24 0857     Wound Culture Light growth (2+) Streptococcus agalactiae (Group B)     Comment:   This organism is considered to be universally susceptible to penicillin.  No further antibiotic testing will be performed. If Clindamycin or Erythromycin is the drug of choice, notify the laboratory within 7 days to request susceptibility testing.        Gram Stain Few (2+) WBCs seen       Rare (1+) Gram positive cocci    Eosinophil Smear - Urine, Urine, Clean Catch [[id number]]  (Normal) Collected: 08/01/24 1547    Lab Status: Final result Specimen: Urine, Clean Catch Updated: 08/02/24 0149     Eosinophil Smear 0 % EOS/100 Cells     MRSA Screen, PCR (Inpatient) - Swab, Nares [[id number]]  (Normal) Collected: 08/01/24 0206    Lab Status: Final result Specimen: Swab from Nares Updated: 08/01/24 0346     MRSA PCR No MRSA Detected    Narrative:      The negative predictive value of this diagnostic test is high and should only be used to consider de-escalating anti-MRSA therapy. A positive result may indicate colonization with MRSA and must be correlated clinically.    Urine Culture - Urine, Straight Cath [[id number]]  (Abnormal)  (Susceptibility) Collected: 07/31/24 1851    Lab Status: Final result Specimen: Urine from Straight Cath Updated: 08/04/24 1129     Urine Culture >100,000 CFU/mL Escherichia coli ESBL    Narrative:      Colonization of the urinary tract without infection is common. Treatment is discouraged unless the patient is symptomatic, pregnant, or undergoing an invasive urologic procedure.  Recent outcomes data supports the use of pip/tazo in the treatment of susceptible ESBL infections for uncomplicated UTI. Consider use of pip/tazo as a carbapenem-sparing regimen in applicable patients.    Susceptibility        Escherichia coli ESBL      MATT      Amikacin Susceptible      Ertapenem Susceptible      Gentamicin Resistant      Levofloxacin Susceptible      Meropenem Susceptible      Nitrofurantoin Susceptible      Piperacillin + Tazobactam Susceptible      Tobramycin Resistant      Trimethoprim + Sulfamethoxazole Resistant                           Blood Culture - Blood, Hand, Left [[id number]]  (Normal) Collected: 07/31/24 1849    Lab Status: Final result Specimen: Blood from Hand, Left Updated: 08/05/24 1915     Blood Culture No growth at 5 days    Blood Culture - Blood, Arm, Left  [[id number]]  (Abnormal) Collected: 07/31/24 1849    Lab Status: Final result Specimen: Blood from Arm, Left Updated: 08/02/24 0545     Blood Culture Staphylococcus, coagulase negative     Isolated from Aerobic Bottle     Gram Stain Aerobic Bottle Gram positive cocci in clusters    Narrative:      Probable contaminant requires clinical correlation, susceptibility not performed unless requested by physician.      Blood Culture ID, PCR - Blood, Arm, Left [[id number]]  (Abnormal) Collected: 07/31/24 1849    Lab Status: Final result Specimen: Blood from Arm, Left Updated: 08/01/24 1117     BCID, PCR Staph spp, not aureus or lugdunensis. Identification by BCID2 PCR.     BOTTLE TYPE Aerobic Bottle            PATHOLOGY RESULTS:       ASSESSMENT/PLAN   Diabetic foot ulcerations to the left foot  Type 2 diabetes mellitus with hyperglycemia  Diabetic polyneuropathy    Review of labs, imaging, and other provider documentation  Comprehensive lower extremity examination and evaluation was performed.    Linear foreign bodies projected over the soft tissues of the second  toe and first toe were noted upon xray imaging (artifacts were also included in the differential). No evidence of soft tissue damage, FB entry, or infection noted to either great or second toes today.      Continue wound care orders- Betadine wet-to-dry dressing to be performed twice daily per nursing.     No surgical interventions are planned at this time from a podiatric standpoint.     Due to difficulty caring for foot wounds at home, our recommendation would be for the patient to go to a skilled nursing facility for ongoing wound care upon discharge.       We will continue to follow patient while admitted.      This document has been electronically signed by SUSAN Perez on August 9, 2024 15:42 CDT

## 2024-08-09 NOTE — PROGRESS NOTES
"INFECTIOUS DISEASES PROGRESS NOTE    Patient:  Erick Luong  YOB: 1955  MRN: [id number]   Admit date: 7/31/2024   Admitting Physician: Alejandrina Barnett DO  Primary Care Physician: Del Shetty MD    Chief Complaint: No complaints    Interval History: When patient was specifically asked about foot pain he said \"a little\" when asked specifically where he did not answer and then when I asked specifically if it was his heel he said, \"yes\"      Allergies:   Allergies   Allergen Reactions    Cefepime Hives and Anaphylaxis    Bactrim [Sulfamethoxazole-Trimethoprim] Other (See Comments)     \"RENAL FAILURE\"    Vancomycin Itching    Zolpidem Mental Status Change     \"makes him crazy\"    Metronidazole Rash       Current Scheduled Medications:   ascorbic acid, 1,000 mg, Oral, Daily  donepezil, 10 mg, Oral, Daily  DULoxetine, 60 mg, Oral, Daily  enoxaparin, 1 mg/kg, Subcutaneous, Q12H  famotidine, 20 mg, Oral, Q12H  insulin glargine, 10 Units, Subcutaneous, Daily  insulin regular, 3-14 Units, Subcutaneous, 4x Daily AC & at Bedtime  melatonin, 2.5 mg, Oral, Nightly  oxyCODONE, 10 mg, Oral, BID  pantoprazole, 40 mg, Oral, Q12H  polyethylene glycol, 17 g, Oral, Daily  pregabalin, 100 mg, Oral, Nightly  pregabalin, 50 mg, Oral, Daily  rosuvastatin, 10 mg, Oral, Nightly  senna-docusate sodium, 2 tablet, Oral, BID  sodium bicarbonate, 650 mg, Oral, TID  sodium chloride, 10 mL, Intravenous, Q12H  sodium chloride, 10 mL, Intravenous, Q12H      Current PRN Medications:    senna-docusate sodium **AND** polyethylene glycol **AND** bisacodyl **AND** bisacodyl    Calcium Replacement - Follow Nurse / BPA Driven Protocol    dextrose    dextrose    dextrose    glucagon (human recombinant)    Magnesium Standard Dose Replacement - Follow Nurse / BPA Driven Protocol    nitroglycerin    ondansetron    Pharmacy to Dose enoxaparin (LOVENOX)    Phosphorus Replacement - Follow Nurse / BPA Driven Protocol    Potassium " "Replacement - Follow Nurse / BPA Driven Protocol    sodium chloride    sodium chloride    sodium chloride    sodium chloride    Pharmacy to Dose enoxaparin (LOVENOX),            Objective     Vital Signs:  Temp (24hrs), Av.8 °F (36.6 °C), Min:97.3 °F (36.3 °C), Max:98.3 °F (36.8 °C)      /87 (BP Location: Right arm, Patient Position: Sitting)   Pulse 80   Temp 97.6 °F (36.4 °C) (Oral)   Resp 16   Ht 182.9 cm (72\")   Wt 134 kg (295 lb 3.2 oz)   SpO2 100%   BMI 40.04 kg/m²         Physical Exam:    General: Patient remains lying in bed in no acute distress.  Respiratory: Effort even and unlabored, is not conversationally dyspneic  Left foot-dressing intact.  Some Betadine has soaked through your his heel.  I did palpate around the lateral portion of his foot as well as plantar surface and did not elicit any pain          Results Review:    I reviewed the patient's new clinical results.    Lab Results:    CBC:   Lab Results   Lab 24  0624  0615 08/05/24  0444 08/08/24  0449 08/09/24  0518   WBC 5.75 5.73 5.89 8.08 7.27   HEMOGLOBIN 10.3* 9.5* 9.5* 9.6* 9.9*   HEMATOCRIT 33.2* 30.5* 30.1* 30.5* 31.6*   PLATELETS 215 201 210 242 247        AutoDiff:   Lab Results   Lab 24  0624  0615 08/05/24  0444   NEUTROPHIL % 57.9 60.7 64.0   LYMPHOCYTE % 20.9 20.1 19.9   MONOCYTES % 8.7 9.6 9.2   EOSINOPHIL % 11.1* 7.9* 4.8   BASOPHIL % 0.9 0.7 0.7   NEUTROS ABS 3.33 3.48 3.78   LYMPHS ABS 1.20 1.15 1.17   MONOS ABS 0.50 0.55 0.54   EOS ABS 0.64* 0.45* 0.28   BASOS ABS 0.05 0.04 0.04        Manual Diff:    Lab Results   Lab 24  062424  0444   NEUTROS ABS 3.33 3.48 3.78           CMP:   Lab Results   Lab 24  0605 24  0615 24  0444 24  0508 24  0758 24  0449 24  0518   SODIUM 143 145 144   < > 144 141 141   POTASSIUM 3.8 3.9 3.9   < > 3.9 3.8 4.1   CHLORIDE 110* 111* 112*   < > 110* 109* 110*   CO2 19.0* 21.0* " 21.0*   < > 20.0* 21.0* 22.0   BUN 31* 24* 20   < > 15 15 15   CREATININE 1.82* 1.60* 1.47*   < > 1.50* 1.42* 1.34*   CALCIUM 8.3* 8.5* 8.3*   < > 8.6 8.5* 8.4*   BILIRUBIN 0.3 0.3 0.3  --   --   --   --    ALK PHOS 88 95 102  --   --   --   --    ALT (SGPT) 7 9 9  --   --   --   --    AST (SGOT) 12 15 16  --   --   --   --    GLUCOSE 125* 97 89   < > 97 110* 108*    < > = values in this interval not displayed.       Estimated Creatinine Clearance: 74.6 mL/min (A) (by C-G formula based on SCr of 1.34 mg/dL (H)).    Culture Results:    Microbiology Results (last 10 days)       Procedure Component Value - Date/Time    Wound Culture - Drainage, Foot, Left [[id number]]  (Abnormal) Collected: 08/02/24 1338    Lab Status: Final result Specimen: Drainage from Foot, Left Updated: 08/04/24 0857     Wound Culture Light growth (2+) Streptococcus agalactiae (Group B)     Comment:   This organism is considered to be universally susceptible to penicillin.  No further antibiotic testing will be performed. If Clindamycin or Erythromycin is the drug of choice, notify the laboratory within 7 days to request susceptibility testing.        Gram Stain Rare (1+) WBCs seen      No organisms seen    Wound Culture - Wound, Foot, Left [[id number]]  (Abnormal) Collected: 08/02/24 1032    Lab Status: Final result Specimen: Wound from Foot, Left Updated: 08/04/24 0857     Wound Culture Light growth (2+) Streptococcus agalactiae (Group B)     Comment:   This organism is considered to be universally susceptible to penicillin.  No further antibiotic testing will be performed. If Clindamycin or Erythromycin is the drug of choice, notify the laboratory within 7 days to request susceptibility testing.        Gram Stain Few (2+) WBCs seen      Rare (1+) Gram positive cocci    Eosinophil Smear - Urine, Urine, Clean Catch [[id number]]  (Normal) Collected: 08/01/24 1547    Lab Status: Final result Specimen: Urine, Clean Catch Updated: 08/02/24 0149      Eosinophil Smear 0 % EOS/100 Cells     MRSA Screen, PCR (Inpatient) - Swab, Nares [[id number]]  (Normal) Collected: 08/01/24 0206    Lab Status: Final result Specimen: Swab from Nares Updated: 08/01/24 0346     MRSA PCR No MRSA Detected    Narrative:      The negative predictive value of this diagnostic test is high and should only be used to consider de-escalating anti-MRSA therapy. A positive result may indicate colonization with MRSA and must be correlated clinically.    Urine Culture - Urine, Straight Cath [[id number]]  (Abnormal)  (Susceptibility) Collected: 07/31/24 1851    Lab Status: Final result Specimen: Urine from Straight Cath Updated: 08/04/24 1129     Urine Culture >100,000 CFU/mL Escherichia coli ESBL    Narrative:      Colonization of the urinary tract without infection is common. Treatment is discouraged unless the patient is symptomatic, pregnant, or undergoing an invasive urologic procedure.  Recent outcomes data supports the use of pip/tazo in the treatment of susceptible ESBL infections for uncomplicated UTI. Consider use of pip/tazo as a carbapenem-sparing regimen in applicable patients.    Susceptibility        Escherichia coli ESBL      MATT      Amikacin Susceptible      Ertapenem Susceptible      Gentamicin Resistant      Levofloxacin Susceptible      Meropenem Susceptible      Nitrofurantoin Susceptible      Piperacillin + Tazobactam Susceptible      Tobramycin Resistant      Trimethoprim + Sulfamethoxazole Resistant                           Blood Culture - Blood, Hand, Left [[id number]]  (Normal) Collected: 07/31/24 1849    Lab Status: Final result Specimen: Blood from Hand, Left Updated: 08/05/24 1915     Blood Culture No growth at 5 days    Blood Culture - Blood, Arm, Left [[id number]]  (Abnormal) Collected: 07/31/24 1849    Lab Status: Final result Specimen: Blood from Arm, Left Updated: 08/02/24 0545     Blood Culture Staphylococcus, coagulase negative     Isolated from Aerobic Bottle      Gram Stain Aerobic Bottle Gram positive cocci in clusters    Narrative:      Probable contaminant requires clinical correlation, susceptibility not performed unless requested by physician.      Blood Culture ID, PCR - Blood, Arm, Left [[id number]]  (Abnormal) Collected: 07/31/24 8539    Lab Status: Final result Specimen: Blood from Arm, Left Updated: 08/01/24 1117     BCID, PCR Staph spp, not aureus or lugdunensis. Identification by BCID2 PCR.     BOTTLE TYPE Aerobic Bottle                 Radiology:   Imaging Results (Last 72 Hours)       ** No results found for the last 72 hours. **                Active Hospital Problems    Diagnosis     **DKA, type 2, not at goal     E. coli UTI, ESBL     Atrial fibrillation     Chronic heart failure with preserved ejection fraction (HFpEF)     Chronic diastolic heart failure     GERD without esophagitis     Diabetic ulcer of left foot associated with type 2 diabetes mellitus          IMPRESSION:  Diabetic foot infection-with group B streptococcus.  Patient is status post bedside debridement per SUSAN Hoffmann.  Completed IV antibiotic treatment August 7.  Urinary tract infection with ESBL E. coli.  According to report, this was a straight catheterization specimen and patient patient did complain of some urinary frequency.  Completed short antibiotic course.  History of osteomyelitis of left foot with MRSA status posttreatment with vancomycin followed by linezolid.  No MRSA isolated this admission.  Uncontrolled diabetes mellitus with hemoglobin A1c greater than 12.    Chronic kidney disease stage IIIb with acute kidney injury.  Nephrology continues to follow.  Creatinine stable to improved..  Positive blood cultures for coagulase-negative staph-contaminant        RECOMMENDATION:   Continue local wound care  Hold any additional antibiotics.  If any increased erythema or indication of recurrent infection to left foot, would direct therapy towards the group B streptococcus that  was isolated previously-would utilize amoxicillin or cephalexin  Awaiting placement for ongoing wound care            Julia Adrian MD  08/09/24  12:55 CDT

## 2024-08-09 NOTE — THERAPY TREATMENT NOTE
Acute Care - Physical Therapy Treatment Note  [facility]     Patient Name: Erick Luong  : 1955  MRN: [id number]  Today's Date: 2024      Visit Dx:     ICD-10-CM ICD-9-CM   1. Diabetic foot infection  E11.628 250.80    L08.9 686.9   2. Osteomyelitis of foot, unspecified laterality, unspecified type  M86.9 730.27   3. Diabetic ketoacidosis without coma associated with type 2 diabetes mellitus  E11.10 250.12   4. Elevated troponin  R79.89 790.6   5. Atrial fibrillation with RVR  I48.91 427.31   6. Confusion  R41.0 298.9   7. Impaired mobility [Z74.09]  Z74.09 799.89     Patient Active Problem List   Diagnosis    Obesity, unspecified obesity severity, unspecified obesity type    Essential hypertension    Type 2 diabetes mellitus with hyperglycemia, with long-term current use of insulin    Nonsmoker    Anemia due to chronic kidney disease    Class 3 severe obesity due to excess calories with body mass index (BMI) of 40.0 to 44.9 in adult    Anasarca    Sleep apnea with use of continuous positive airway pressure (CPAP)    Medically noncompliant    Diabetic ulcer of left foot associated with type 2 diabetes mellitus    Diabetic polyneuropathy associated with type 2 diabetes mellitus    Spinal stenosis in cervical region    Degeneration of cervical intervertebral disc    Cervical radiculopathy    Degeneration of lumbar or lumbosacral intervertebral disc    Cervical myelopathy    Bilateral carpal tunnel syndrome    CAD (coronary artery disease)    GERD without esophagitis    BPH without obstruction/lower urinary tract symptoms    Stage 3b chronic kidney disease    Chronic diastolic heart failure    Type 2 myocardial infarction due to heart failure    Left carpal tunnel syndrome    Syncope and collapse, recurrent episodes    Poorly-controlled hypertension    Rhinovirus    Peripheral vascular disease    Chronic kidney disease (CKD), stage IV (severe)    Diabetic foot infection    Sepsis    Epigastric pain     Chronic heart failure with preserved ejection fraction (HFpEF)    Sepsis due to methicillin resistant Staphylococcus aureus (MRSA) with encephalopathy without septic shock    Slow transit constipation    CHF exacerbation    CHF (congestive heart failure), NYHA class I, acute on chronic, combined    Rectal bleeding    Acute on chronic heart failure with preserved ejection fraction (HFpEF)    DKA, type 2, not at goal    Atrial fibrillation    E. coli UTI, ESBL     Past Medical History:   Diagnosis Date    Arthritis     Autonomic disease     CAD (coronary artery disease) 02/06/2017    Cervical radiculopathy 09/16/2021    Chronic constipation with acute exaccerbation 05/10/2021    Coronary artery disease     Degeneration of cervical intervertebral disc 08/11/2021    Diabetes mellitus     Diabetic foot ulcer 08/31/2020    Diabetic polyneuropathy associated with type 2 diabetes mellitus 01/18/2021    Elevated cholesterol     Gastroesophageal reflux disease 05/13/2019    Headache     HTN (hypertension), benign 05/03/2017    Hyperlipidemia     Hypertension     Mixed hyperlipidemia 02/07/2017    Multiple lung nodules 01/26/2020    5mm, 9 mm RLL identified 1/2020, not present 10/2019.    Myocardial infarction     Osteomyelitis 01/22/2020    Osteomyelitis of fifth toe of right foot 10/07/2019    Pancreatitis     Persistent insomnia 01/20/2020    Renal disorder     Sleep apnea 02/06/2017    Sleep apnea with use of continuous positive airway pressure (CPAP)     NON-COMPLIANT    Slow transit constipation 01/16/2019    Spinal stenosis in cervical region 09/16/2021    Vitamin D deficiency 03/02/2021     Past Surgical History:   Procedure Laterality Date    ABDOMINAL SURGERY      AMPUTATION FOOT / TOE Left 10/2021    5th digit     ANTERIOR CERVICAL DISCECTOMY W/ FUSION N/A 08/05/2022    Procedure: CERVICAL DISCECTOMY ANTERIOR WITH FUSION C5-6 with possible plating of C5-7 with neuromonitoring and 1 c-arm;  Surgeon: Karel Soliz,  MD;  Location: [facility] OR;  Service: Neurosurgery;  Laterality: N/A;    APPENDECTOMY      BACK SURGERY      CARDIAC CATHETERIZATION Left 02/08/2021    Procedure: Left Heart Cath w poss intervention left anatomical snuff box acess;  Surgeon: Omkar Charles DO;  Location: [facility] CATH INVASIVE LOCATION;  Service: Cardiology;  Laterality: Left;    CARDIAC SURGERY      CATARACT EXTRACTION      CERVICAL SPINE SURGERY      COLONOSCOPY N/A 01/31/2017    Normal exam repeat in 5 years    COLONOSCOPY N/A 02/11/2019    Mild acute inflammation    COLONOSCOPY N/A 04/07/2024    2 areas at 10 and 20 cm with friability ulceration 2 clips placed at 20 cm and 4 clips at 10 cm poor prep normal mucosa,mild eroisions and ulcerations in visible vessels    COLONOSCOPY N/A 7/1/2024    Procedure: COLONOSCOPY WITH ANESTHESIA;  Surgeon: Arsalan Lorenzo DO;  Location: [facility] ENDOSCOPY;  Service: Gastroenterology;  Laterality: N/A;  pre op constipation/diarrhea  post poor prep  pcp Del Shetty    COLONOSCOPY N/A 7/2/2024    Procedure: COLONOSCOPY WITH ANESTHESIA;  Surgeon: Agapito Christopher MD;  Location: [facility] ENDOSCOPY;  Service: Gastroenterology;  Laterality: N/A;  pre rectal bleeding  post poor prep  pcp Del Shetty MD    COLONOSCOPY W/ POLYPECTOMY  03/04/2014    Hyperplastic polyp    CORONARY ARTERY BYPASS GRAFT  10/2015    ENDOSCOPY  04/13/2011    Gastritis with hemorrhage    ENDOSCOPY N/A 05/05/2017    Normal exam    ENDOSCOPY N/A 02/11/2019    Gastritis    ENDOSCOPY N/A 09/01/2020    Non-erosive gastritis with hemorrhage    ENDOSCOPY N/A 02/10/2021    Esophagitis    ENDOSCOPY N/A 04/11/2024    There were esophageal mucosal changes suspicious for short-segment Low's esophagus present in the distal esophagus. The maximum longitudinal extent of these mucosal changes was 2 cm in length. Mucosa was biopsied with a cold forceps for histologyDistal esophagus, biopsies: Mild chronic active esophagogastritis. No  evidence of intestinal metaplasia, dysplasia. Antrum, bx, Mild chronic gastritis    FOOT SURGERY Left     INCISION AND DRAINAGE OF WOUND Left 09/2007    spider bite     PT Assessment (Last 12 Hours)       PT Evaluation and Treatment       Row Name 08/09/24 1120          Physical Therapy Time and Intention    Subjective Information complains of;weakness  -LY     Document Type therapy note (daily note)  -LY     Mode of Treatment physical therapy  -LY       Row Name 08/09/24 1120          General Information    Existing Precautions/Restrictions fall;other (see comments)  Left foot wound, surgical shoe  -LY       Row Name 08/09/24 1120          Pain    Pretreatment Pain Rating 6/10  -LY     Posttreatment Pain Rating 6/10  -LY     Pain Location - Side/Orientation Left  -LY     Pain Location - foot  -LY     Pain Intervention(s) Medication (See MAR);Ambulation/increased activity  -LY       Row Name 08/09/24 1120          Bed Mobility    Comment, (Bed Mobility) EOB  -LY       Row Name 08/09/24 1120          Transfers    Comment, (Transfers) required hygiene care after found with incontinent episode  -LY       Row Name 08/09/24 1120          Sit-Stand Transfer    Sit-Stand [place] (Transfers) verbal cues;contact guard  -LY       Row Name 08/09/24 1120          Stand-Sit Transfer    Stand-Sit [place] (Transfers) verbal cues;contact guard  -LY       Row Name 08/09/24 1120          Gait/Stairs (Locomotion)    [place] Level (Gait) verbal cues;contact guard  -LY     Assistive Device (Gait) walker, front-wheeled  -LY     Distance in Feet (Gait) 50  x4  -LY     Pattern (Gait) step-through  -LY     Deviations/Abnormal Patterns (Gait) gait speed decreased;stride length decreased  -LY     Bilateral Gait Deviations forward flexed posture;heel strike decreased  -LY       Row Name             Wound 08/22/23 0140 Left posterior plantar    Wound - Properties Group Placement Date: 08/22/23  -AM Placement Time: 0140  -AM  Present on Original Admission: Y  -AM Side: Left  -AM Orientation: posterior  -AM Location: plantar  -AM    Retired Wound - Properties Group Placement Date: 08/22/23  -AM Placement Time: 0140  -AM Present on Original Admission: Y  -AM Side: Left  -AM Orientation: posterior  -AM Location: plantar  -AM    Retired Wound - Properties Group Date first assessed: 08/22/23  -AM Time first assessed: 0140  -AM Present on Original Admission: Y  -AM Side: Left  -AM Location: plantar  -AM      Row Name             Wound Left lateral foot Diabetic Ulcer    Wound - Properties Group Side: Left  -MH Orientation: lateral  -MH Location: foot  -JACIEL, 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;diabetic foot ulcer/gangrene  Primary Wound Type: Diabetic ulc  -JACIEL Wound Outcome: Amputation  -JACIEL Stage, Pressure Injury : other (see comments)  -JACIEL, full thickness starting 10/20/21     Retired Wound - Properties Group Side: Left  -MH Orientation: lateral  -MH Location: foot  -JACIEL, 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;diabetic foot ulcer/gangrene  Primary Wound Type: Diabetic ulc  -JACIEL Stage, Pressure Injury : other (see comments)  -JACIEL, full thickness starting 10/20/21  Wound Outcome: Amputation  -JACIEL    Retired Wound - Properties Group Side: Left  -MH Location: foot  -JACIEL, 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;diabetic foot ulcer/gangrene  Primary Wound Type: Diabetic ulc  -JACIEL Wound Outcome: Amputation  -JACIEL      Row Name             Wound 06/15/23 0102 Left anterior plantar    Wound - Properties Group Placement Date: 06/15/23  -SM Placement Time: 0102  -SM Side: Left  -SM Orientation: anterior  -SM Location: plantar  -SM    Retired Wound - Properties Group Placement Date: 06/15/23  -SM Placement Time: 0102  -SM Side: Left  -SM Orientation: anterior  -SM Location: plantar  -SM    Retired Wound - Properties Group Date first assessed: 06/15/23  -SM Time first assessed: 0102  -SM Side: Left  -SM Location: plantar  -SM      Row Name 08/09/24 1120          Plan of Care Review    Plan of Care Reviewed  With patient  -LY     Progress improving  -LY     Outcome Evaluation PT tx completed. Pt sitting EOB on arrival. Requiring assist for hygiene care post incontinent episode, pt seemingly unaware of BM. Assist required for donning of sock and surgical shoe. Encouragement to attempt farther distances with ambulation. CGA and RWX for ambulation with occasional cues for RWX safety and gait mechanics. Will follow.  -LY       Row Name 08/09/24 1120          Positioning and Restraints    Pre-Treatment Position in bed  -LY     Post Treatment Position bed  -LY     In Bed sitting EOB;call light within reach;encouraged to call for assist  -LY               User Key  (r) = Recorded By, (t) = Taken By, (c) = Cosigned By      Initials Name Provider Type    Yuliana Lisa, RN Registered Nurse    MH Haase, Mallory L, RN Registered Nurse    Alesha Armenta, PTA Physical Therapist Assistant    Faviola Kunz RN Registered Nurse    Michelle Diaz RN Registered Nurse                    Physical Therapy Education       Title: PT OT SLP Therapies (In Progress)       Topic: Physical Therapy (Done)       Point: Mobility training (Done)       Learning Progress Summary             Patient Acceptance, E,D, DU,VU by  at 8/4/2024 1457    Comment: benefits of PT and POC, call for A OOB                         Point: Precautions (Done)       Learning Progress Summary             Patient Acceptance, E,D, DU,VU by  at 8/4/2024 1457    Comment: benefits of PT and POC, call for A OOB                                         User Key       Initials Effective Dates Name Provider Type [facility] 02/03/23 -  Daniel Garcia, PT Physical Therapist PT                  PT Recommendation and Plan  Anticipated Discharge Disposition (PT): skilled nursing facility  Plan of Care Reviewed With: patient  Progress: improving  Outcome Evaluation: PT tx completed. Pt sitting EOB on arrival. Requiring assist for hygiene care post  incontinent episode, pt seemingly unaware of BM. Assist required for donning of sock and surgical shoe. Encouragement to attempt farther distances with ambulation. CGA and RWX for ambulation with occasional cues for RWX safety and gait mechanics. Will follow.       Time Calculation:    PT Charges       Row Name 08/09/24 1353             Time Calculation    Start Time 1120  -LY      Stop Time 1136  -LY      Time Calculation (min) 16 min  -LY      PT Received On 08/09/24  -LY         Time Calculation- PT    Total Timed Code Minutes- PT 16 minute(s)  -LY         Timed Charges    79558 - Gait Training Minutes  16  -LY         Total Minutes    Timed Charges Total Minutes 16  -LY       Total Minutes 16  -LY                User Key  (r) = Recorded By, (t) = Taken By, (c) = Cosigned By      Initials Name Provider Type    Alesha Armenta PTA Physical Therapist Assistant                  Therapy Charges for Today       Code Description Service Date Service Provider Modifiers Qty    33776213884 HC GAIT TRAINING EA 15 MIN 8/8/2024 Alesha Dominguez, PARUL GP 1    55118872377 HC GAIT TRAINING EA 15 MIN 8/9/2024 Alesha Dominguez PTA GP 1            PT G-Codes  Outcome Measure Options: AM-PAC 6 Clicks Daily Activity (OT)  AM-PAC 6 Clicks Score (PT): 19  AM-PAC 6 Clicks Score (OT): 15    Alesha Dominguez PTA  8/9/2024

## 2024-08-09 NOTE — DISCHARGE PLACEMENT REQUEST
Erick Luong (69 y.o. Male)       Date of Birth   1955    Social Security Number       Address   [address] [place] [address]    Home Phone       MRN   [id number]       Taoism   Mosque    Marital Status                               Admission Date   7/31/24    Admission Type   Emergency    Admitting Provider   Alejandrina Barnett DO    Attending Provider   Alejandrina Barnett DO    Department, Room/Bed   [facility] 3C, 365/1       Discharge Date       Discharge Disposition       Discharge Destination                                   Physician Progress Notes (most recent note)        Tahira Kellogg APRN at 08/08/24 1646       Attestation signed by Asad Cerrato DPM at 08/08/24 1655    I have reviewed this documentation and agree.    Updated image was taken of wounds.  Clinical improvement noted.                       [facility] - PODIATRY    Today's Date: 08/08/24     Patient Name: Erick Luong  MRN: [id number]  CSN: [id number]  PCP: Del Shetty MD  Referring Provider: Abebe Garzon DO  Attending Provider: Alejandrina Barnett DO  Length of Stay: 8    SUBJECTIVE   Chief Complaint: Left foot wounds    HPI: Erick Luong, a 69 y.o.male, who is being followed by podiatry for left foot wounds.  Patient reports nursing has been changing his dressing regularly.  Relays that his pain is 8/10 currently to his left foot. States that he will likely be discharged in the next few days to a skilled nursing facility. Denies significant overnight events.    Past Medical History:   Diagnosis Date    Arthritis     Autonomic disease     CAD (coronary artery disease) 02/06/2017    Cervical radiculopathy 09/16/2021    Chronic constipation with acute exaccerbation 05/10/2021    Coronary artery disease     Degeneration of cervical intervertebral disc 08/11/2021    Diabetes mellitus     Diabetic foot ulcer 08/31/2020    Diabetic polyneuropathy associated  with type 2 diabetes mellitus 01/18/2021    Elevated cholesterol     Gastroesophageal reflux disease 05/13/2019    Headache     HTN (hypertension), benign 05/03/2017    Hyperlipidemia     Hypertension     Mixed hyperlipidemia 02/07/2017    Multiple lung nodules 01/26/2020    5mm, 9 mm RLL identified 1/2020, not present 10/2019.    Myocardial infarction     Osteomyelitis 01/22/2020    Osteomyelitis of fifth toe of right foot 10/07/2019    Pancreatitis     Persistent insomnia 01/20/2020    Renal disorder     Sleep apnea 02/06/2017    Sleep apnea with use of continuous positive airway pressure (CPAP)     NON-COMPLIANT    Slow transit constipation 01/16/2019    Spinal stenosis in cervical region 09/16/2021    Vitamin D deficiency 03/02/2021     Past Surgical History:   Procedure Laterality Date    ABDOMINAL SURGERY      AMPUTATION FOOT / TOE Left 10/2021    5th digit     ANTERIOR CERVICAL DISCECTOMY W/ FUSION N/A 08/05/2022    Procedure: CERVICAL DISCECTOMY ANTERIOR WITH FUSION C5-6 with possible plating of C5-7 with neuromonitoring and 1 c-arm;  Surgeon: Karel Soliz MD;  Location:  [facility] OR;  Service: Neurosurgery;  Laterality: N/A;    APPENDECTOMY      BACK SURGERY      CARDIAC CATHETERIZATION Left 02/08/2021    Procedure: Left Heart Cath w poss intervention left anatomical snuff box acess;  Surgeon: Omkar Charles DO;  Location:  [facility] CATH INVASIVE LOCATION;  Service: Cardiology;  Laterality: Left;    CARDIAC SURGERY      CATARACT EXTRACTION      CERVICAL SPINE SURGERY      COLONOSCOPY N/A 01/31/2017    Normal exam repeat in 5 years    COLONOSCOPY N/A 02/11/2019    Mild acute inflammation    COLONOSCOPY N/A 04/07/2024    2 areas at 10 and 20 cm with friability ulceration 2 clips placed at 20 cm and 4 clips at 10 cm poor prep normal mucosa,mild eroisions and ulcerations in visible vessels    COLONOSCOPY N/A 7/1/2024    Procedure: COLONOSCOPY WITH ANESTHESIA;  Surgeon: Arsalan Lorenzo DO;   Location: [facility] ENDOSCOPY;  Service: Gastroenterology;  Laterality: N/A;  pre op constipation/diarrhea  post poor prep  pcp Del Shetty    COLONOSCOPY N/A 2024    Procedure: COLONOSCOPY WITH ANESTHESIA;  Surgeon: Agapito Christopher MD;  Location: [facility] ENDOSCOPY;  Service: Gastroenterology;  Laterality: N/A;  pre rectal bleeding  post poor prep  pcp Del Shetty MD    COLONOSCOPY W/ POLYPECTOMY  2014    Hyperplastic polyp    CORONARY ARTERY BYPASS GRAFT  10/2015    ENDOSCOPY  2011    Gastritis with hemorrhage    ENDOSCOPY N/A 2017    Normal exam    ENDOSCOPY N/A 2019    Gastritis    ENDOSCOPY N/A 2020    Non-erosive gastritis with hemorrhage    ENDOSCOPY N/A 02/10/2021    Esophagitis    ENDOSCOPY N/A 2024    There were esophageal mucosal changes suspicious for short-segment Low's esophagus present in the distal esophagus. The maximum longitudinal extent of these mucosal changes was 2 cm in length. Mucosa was biopsied with a cold forceps for histologyDistal esophagus, biopsies: Mild chronic active esophagogastritis. No evidence of intestinal metaplasia, dysplasia. Antrum, bx, Mild chronic gastritis    FOOT SURGERY Left     INCISION AND DRAINAGE OF WOUND Left 2007    spider bite     Family History   Problem Relation Age of Onset    Colon cancer Father     Heart disease Father     Colon cancer Sister     Colon polyps Sister     Alzheimer's disease Mother     Coronary artery disease Sister     Coronary artery disease Sister      Social History     Socioeconomic History    Marital status:    Tobacco Use    Smoking status: Former     Current packs/day: 0.00     Types: Cigarettes     Quit date:      Years since quittin.6    Smokeless tobacco: Never    Tobacco comments:     smoked in highschool   Vaping Use    Vaping status: Never Used   Substance and Sexual Activity    Alcohol use: No    Drug use: No    Sexual activity: Defer     Allergies   Allergen  "Reactions    Cefepime Hives and Anaphylaxis    Bactrim [Sulfamethoxazole-Trimethoprim] Other (See Comments)     \"RENAL FAILURE\"    Vancomycin Itching    Zolpidem Mental Status Change     \"makes him crazy\"    Metronidazole Rash     Current Facility-Administered Medications   Medication Dose Route Frequency Provider Last Rate Last Admin    ascorbic acid (VITAMIN C) tablet 1,000 mg  1,000 mg Oral Daily Reuben Garza APRN   1,000 mg at 08/08/24 0823    sennosides-docusate (PERICOLACE) 8.6-50 MG per tablet 2 tablet  2 tablet Oral BID Lamine Figueroa APRN   2 tablet at 08/07/24 2229    And    polyethylene glycol (MIRALAX) packet 17 g  17 g Oral Daily PRN Lamine Figueroa APRN        And    bisacodyl (DULCOLAX) EC tablet 5 mg  5 mg Oral Daily PRN Lamine Figueroa APRN        And    bisacodyl (DULCOLAX) suppository 10 mg  10 mg Rectal Daily PRN Lamine Figueroa APRN   10 mg at 08/02/24 0941    Calcium Replacement - Follow Nurse / BPA Driven Protocol   Does not apply PRN Abebe Garzon DO        dextrose (D50W) (25 g/50 mL) IV injection 10-50 mL  10-50 mL Intravenous Q15 Min PRN Abebe Garzon DO        dextrose (D50W) (25 g/50 mL) IV injection 25 g  25 g Intravenous Q15 Min PRN Lamine Figueroa APRN        dextrose (GLUTOSE) oral gel 15 g  15 g Oral Q15 Min PRN Lamine Figueroa APRN        donepezil (ARICEPT) tablet 10 mg  10 mg Oral Daily Reuben Garza APRN   10 mg at 08/08/24 0824    DULoxetine (CYMBALTA) DR capsule 60 mg  60 mg Oral Daily Reuben Garza APRN   60 mg at 08/08/24 0824    Enoxaparin Sodium (LOVENOX) syringe 135 mg  1 mg/kg Subcutaneous Q12H Abebe Garzon DO   135 mg at 08/08/24 0831    famotidine (PEPCID) tablet 20 mg  20 mg Oral Q12H Reuben Garza APRN   20 mg at 08/08/24 0824    glucagon (GLUCAGEN) injection 1 mg  1 mg Intramuscular Q15 Min PRN Lamine Figueroa APRN        insulin glargine (LANTUS, SEMGLEE) injection 10 Units  10 Units Subcutaneous Daily Keren Jamison MD "   10 Units at 08/08/24 0823    insulin regular (humuLIN R,novoLIN R) injection 3-14 Units  3-14 Units Subcutaneous 4x Daily AC & at Bedtime Keren Jamison MD   3 Units at 08/03/24 2033    Magnesium Standard Dose Replacement - Follow Nurse / BPA Driven Protocol   Does not apply PRN Abebe Garzon DO        melatonin tablet 2.5 mg  2.5 mg Oral Nightly Reuben Garza APRN   2.5 mg at 08/07/24 2230    nitroglycerin (NITROSTAT) SL tablet 0.4 mg  0.4 mg Sublingual Q5 Min PRN Lamine Figueroa APRN        ondansetron (ZOFRAN) injection 4 mg  4 mg Intravenous Q6H PRN Payam Keyes DO   4 mg at 08/08/24 0900    oxyCODONE (ROXICODONE) immediate release tablet 10 mg  10 mg Oral BID Lamine Figueroa APRN   10 mg at 08/08/24 0824    pantoprazole (PROTONIX) EC tablet 40 mg  40 mg Oral Q12H Reuben Garza APRN   40 mg at 08/08/24 0824    Pharmacy to Dose enoxaparin (LOVENOX)   Does not apply Continuous PRN Abebe Garzon DO        Phosphorus Replacement - Follow Nurse / BPA Driven Protocol   Does not apply PRN Abebe Garzon DO        polyethylene glycol (MIRALAX) packet 17 g  17 g Oral Daily Reuben Garza APRN   17 g at 08/08/24 0823    Potassium Replacement - Follow Nurse / BPA Driven Protocol   Does not apply PRN Abebe Garzon DO        pregabalin (LYRICA) capsule 100 mg  100 mg Oral Nightly Reuben Garza APRN   100 mg at 08/07/24 2229    pregabalin (LYRICA) capsule 50 mg  50 mg Oral Daily Reuben Garza APRN   50 mg at 08/08/24 0823    rosuvastatin (CRESTOR) tablet 10 mg  10 mg Oral Nightly Reuben Garza APRN   10 mg at 08/07/24 2231    sodium bicarbonate tablet 650 mg  650 mg Oral TID Brian Lomas MD   650 mg at 08/08/24 0824    sodium chloride 0.9 % flush 10 mL  10 mL Intravenous Q12H Abebe Garzon,    10 mL at 08/08/24 0825    sodium chloride 0.9 % flush 10 mL  10 mL Intravenous PRN Abebe Garzon,         sodium chloride 0.9 % flush 10 mL  10 mL Intravenous  Q12H Lamine Figueroa APRN   10 mL at 08/08/24 0823    sodium chloride 0.9 % flush 10 mL  10 mL Intravenous PRN Lamine Figueroa APRN        sodium chloride 0.9 % infusion 40 mL  40 mL Intravenous PRN Abebe Garzon,         sodium chloride 0.9 % infusion 40 mL  40 mL Intravenous PRN Lamine Figueroa APRN        sodium hypochlorite (DAKIN'S 1/4 STRENGTH) 0.125 % topical solution 0.125% solution   Topical Q12H Aby High APRN   Given at 08/06/24 1000     Review of Systems   Constitutional:  Negative for activity change.   HENT:  Negative for congestion.    Respiratory:  Negative for apnea and shortness of breath.    Gastrointestinal:  Negative for abdominal pain.   Musculoskeletal:  Negative for arthralgias and back pain.        Foot pain   Skin:  Positive for wound.   Neurological:  Positive for numbness.   Psychiatric/Behavioral:  Positive for decreased concentration. Negative for agitation, behavioral problems and confusion.        OBJECTIVE     Vitals:    08/08/24 1547   BP: 150/76   Pulse: 80   Resp: 16   Temp: 97.3 °F (36.3 °C)   SpO2: 95%       PHYSICAL EXAM  GEN:   Pt presents in hospital bed. Accompanied by none.     Foot/Ankle Exam    GENERAL  Appearance:  chronically ill  Affect:  unfocused  Right shoe gear: sock  Left shoe gear: sock    VASCULAR     Right Foot Vascularity   Dorsalis pedis:  2+  Posterior tibial:  2+  Skin temperature:  warm  Edema grading:  Trace  CFT:  3  Pedal hair growth:  Absent     Left Foot Vascularity   Dorsalis pedis:  2+  Posterior tibial:  2+  Skin temperature:  warm  Edema grading:  Trace  CFT:  3  Pedal hair growth:  Absent     NEUROLOGIC     Right Foot Neurologic   Light touch sensation: diminished  Vibratory sensation: diminished  Hot/Cold sensation: diminished     Left Foot Neurologic   Light touch sensation: diminished  Vibratory sensation: diminished  Hot/Cold sensation:  diminished    MUSCULOSKELETAL     Right Foot Musculoskeletal   Ecchymosis:   none  Tenderness:  none       Left Foot Musculoskeletal   Ecchymosis:  none  Tenderness:  lateral foot tenderness    MUSCLE STRENGTH     Right Foot Muscle Strength   Foot dorsiflexion:  5  Foot plantar flexion:  5  Foot inversion:  5  Foot eversion:  5     Left Foot Muscle Strength   Foot dorsiflexion:  4+  Foot plantar flexion:  4+  Foot inversion:  4+  Foot eversion:  4+    DERMATOLOGIC      Right Foot Dermatologic   Skin  Right foot skin is intact.      Left Foot Dermatologic   Skin  Positive for drainage, erythema and ulcer.      Left foot additional comments: Multiple areas of ulceration with serosanguineous drainage, erythema, and slight tenderness present. No malodor noticed.    See attached photos.               RADIOLOGY/NUCLEAR:  US Renal Bilateral    Result Date: 8/1/2024  Narrative: US RENAL BILATERAL- 8/1/2024 11:19 AM  HISTORY: Acute kidney injury; E11.628-Type 2 diabetes mellitus with other skin complications; L08.9-Local infection of the skin and subcutaneous tissue, unspecified; M86.9-Osteomyelitis, unspecified; E11.10-Type 2 diabetes mellitus with ketoacidosis without coma; R79.89-Other specified abnormal findings of blood chemistry; I48.91-Unspecified atrial fibrillation; R41.0-Disorientation, unspecified  COMPARISON: None available.   TECHNIQUE: Multiple longitudinal and transverse real-time sonographic images of the kidneys and urinary bladder are obtained. Report and images stored per institutional and state regulations.    FINDINGS:  Visualized proximal abdominal aorta and IVC are unremarkable.   RIGHT KIDNEY: Right kidney is 10.7 cm in length. Renal cortex is unremarkable. There is no hydronephrosis. There is an exophytic anechoic simple right renal cyst measuring 4.9 cm..  LEFT KIDNEY: Left kidney is 11.3 cm in length. Renal cortex is unremarkable. There is no left hydronephrosis.  PELVIS: Urinary bladder is unremarkable.       Impression:  1. Simple right renal cyst. 2. Otherwise  unremarkable kidneys and no hydronephrosis.    This report was signed and finalized on 8/1/2024 1:02 PM by Eran Christina.      CT Abdomen Pelvis With Contrast    Result Date: 8/1/2024  Narrative: EXAMINATION: CT ABDOMEN PELVIS W CONTRAST-   7/31/2024 10:41 PM  HISTORY: abdominal distention with pain; M86.9-Osteomyelitis, unspecified; E11.10-Type 2 diabetes mellitus with ketoacidosis without coma; R79.89-Other specified abnormal findings of blood chemistry; I48.91-Unspecified atrial fibrillation; R41.0-Disorientation, unspecified  In order to have a CT radiation dose as low as reasonably achievable Automated Exposure Control was utilized for adjustment of the mA and/or KV according to patient size.  Total DLP = 1841.72 mGy.cm  The CT scan of the abdomen and pelvis is performed after intravenous contrast enhancement.  The images are acquired in axial plane and subsequent reconstruction in coronal and sagittal planes.  Comparison is made with the previous study dated 6/27/2024.  The lung bases included in the study show a trace right and small left basal pleural effusion. There are mild atelectatic changes at bilateral bases left more than the right.  Limited visualized cardiomediastinal structures show atheromatous changes of the coronary arteries. There is moderate cardiomegaly.  The liver and spleen are normal.  The gallbladder is surgically absent.  Fatty infiltrated pancreas seen. No focal abnormality. No ductal dilatation. The adrenal glands are normal.  There is persistent bilateral significant perinephric fat infiltration and thickening of the pararenal fascia which is similar to the previous study. Bilateral nephrogram is normal and symmetrical. No calculi. No hydronephrosis. There is a well-defined sharply marginated low density exophytic mass from the lower pole of the right kidney measuring 4.4 cm in diameter. CT density suggest a cyst. Limited visualized ureters are normal and nondilated. The urinary  bladder is well distended. No intrinsic abnormality.  Prostate is not significantly enlarged.  There are small fat-containing inguinal hernias, right larger than the left.  There is subcutaneous fat infiltration of the entire abdominal wall extending into the lower extremity. This may represent a fluid overload?.  The stomach is decompressed with moderate wall thickening. No focal abnormality Alamast. Duodenum is normal. Small bowel is nondistended and nondilated. Appendix is surgically absent. There is significant large volume stool throughout the colon. No finding to suggest obstruction.  Atheromatous changes of the abdominal aorta and iliac arteries. No aneurysmal dilatation.  Moderately prominent nonspecific retroperitoneal para-aortic lymph nodes predominantly in the mid abdomen. A referenced left para iliac lymph node, image #57 in series 2 and image #40 and series 3, measures 1.6 cm in short axis. There is a large lymph node in the left lower anterior pelvis/external iliac group measuring 1.3 cm in short axis. There are moderately prominent inguinal lymph nodes. A left inguinal lymph node measures 2 cm in short axis.  Images reviewed in bone window show chronic degenerative changes of the lumbar spine. No acute bony abnormality.      Impression: 1. A significant perinephric fat stranding is similar to the previous study and is a nonspecific finding. Possibility for chronic inflammatory process/chronic pyelonephritis may not be excluded. Renal functions are normal and symmetrical. 2. Fatty infiltration of the pancreas. No mass. No ductal dilatation. 3. Nonspecific abdominal, pelvic and inguinal lymphadenopathy. The etiology and clinical significance is not certain. This appears moderately more progressive since the previous study. 4. Diffuse subcutaneous fat infiltration of the abdominal wall extending into the lower extremity may represent fluid overload?. 5. A significant large volume of stool in the colon  without evidence of obstruction. This may represent constipation.  The above study was initially reviewed and reported by [employer]. I do not find any discrepancies.           This report was signed and finalized on 8/1/2024 7:50 AM by Dr. Carlos Cutler MD.      CT Head Without Contrast    Result Date: 8/1/2024  Narrative: EXAMINATION: CT HEAD WO CONTRAST-   7/31/2024 10:41 PM  HISTORY: altered mental status; M86.9-Osteomyelitis, unspecified; E11.10-Type 2 diabetes mellitus with ketoacidosis without coma; R79.89-Other specified abnormal findings of blood chemistry; I48.91-Unspecified atrial fibrillation; R41.0-Disorientation, unspecified  In order to have a CT radiation dose as low as reasonably achievable Automated Exposure Control was utilized for adjustment of the mA and/or KV according to patient size.  Total DLP = 783.72 mGy.cm  The CT scan of the head is performed without intravenous contrast enhancement.  The images are acquired in axial plane and subsequent reconstruction with coronal and sagittal planes.  Comparison is made with the previous study dated 5/3/2024.  There is no evidence of a mass. There is no midline shift.  There is no evidence of intracranial hemorrhage or hematoma.  Moderately dilated ventricles, basal cisterns and the cortical sulci are similar to the previous study representing chronic volume loss.  Areas of chronic white matter ischemia bilaterally are noted. The gray-white matter differentiation is maintained.  Posterior fossa structures are normal.  The images reviewed in bone window show no acute displaced skull fracture. A subtle nondisplaced fracture or lesion may be obscured due to motion artifacts. Large mucous retention cyst is seen in the right maxillary antrum. The remaining paranasal sinuses and mastoid air cells are clear.      Impression: 1. No acute intracranial abnormality. 2. Chronic ischemic and atrophic changes. 3. Chronic maxillary sinusitis.  The above study was  initially reviewed and reported by [employer]. I do not find any discrepancies.             This report was signed and finalized on 8/1/2024 5:27 AM by Dr. Carlos Cutler MD.      XR Foot 3+ View Left    Result Date: 7/31/2024  Narrative: EXAMINATION:  XR FOOT 3+ VW LEFT-  7/31/2024 6:22 PM  HISTORY: Heel wound.  COMPARISON: 3/26/2024.  TECHNIQUE: 3 views were obtained.  FINDINGS: There is a deep ulcer on the sole of the foot posteriorly. The ulcer appears to be packed with some type of radiopaque material. On the lateral image, there may be a small area of bony erosion involving the calcaneus just posterior to the plantar calcaneal spur. A small area of osteomyelitis is not ruled out. There has been prior amputation of the fifth toe and distal fifth metatarsal. There may have been prior resection of the distal fourth metacarpal and a portion of the proximal phalanx of the fourth toe versus chronic erosive change. The appearance is stable. There is severe narrowing of the first MTP joint. There is narrowing of some of the interphalangeal joints. There is some spurring in the tarsal region and at the tarsal-metatarsal junction. There is soft tissue swelling of the foot diffusely. There is a small curvilinear foreign body in the soft tissues along the sole of the foot in the second toe region in the proximal phalanx area. There is another small linear foreign body in the soft tissues adjacent to the distal phalanx of the great toe.       Impression: 1. Deep ulcer on the sole of the foot posteriorly near the calcaneus. There is a questionable small area of bony erosion along the plantar surface of the calcaneus just proximal to the plantar calcaneal spur. Osteomyelitis cannot be ruled out. The soft tissue ulcer is packed with some type of radiopaque material. 2. Linear foreign bodies projected over the soft tissues of the second toe and first toe. Artifacts are also included in the differential. 3. Other chronic  changes, as discussed.   This report was signed and finalized on 7/31/2024 7:47 PM by Dr. Raz Mendes MD.      XR Chest 1 View    Result Date: 7/31/2024  Narrative: EXAMINATION:  XR CHEST 1 VW-  7/31/2024 6:22 PM  HISTORY: Altered mental status. Hypertension and diabetes.  COMPARISON: 6/28/2024.  TECHNIQUE: Single view AP image.  FINDINGS: There is hypoventilation with vascular crowding. There is mild bronchial wall thickening, stable. There is no dense infiltrate or effusion. Heart size is borderline. Prior heart bypass surgery. Prior cervical fusion. No definite acute bony abnormality.       Impression: 1. Hypoventilation with vascular crowding. 2. Mild bronchial wall thickening, stable.    This report was signed and finalized on 7/31/2024 7:41 PM by Dr. Raz Mendes MD.       LABORATORY/CULTURE RESULTS:  Results from last 7 days   Lab Units 08/08/24  0449 08/05/24  0444 08/04/24  0615   WBC 10*3/mm3 8.08 5.89 5.73   HEMOGLOBIN g/dL 9.6* 9.5* 9.5*   HEMATOCRIT % 30.5* 30.1* 30.5*   PLATELETS 10*3/mm3 242 210 201     Results from last 7 days   Lab Units 08/08/24  0449 08/07/24  0758 08/06/24  0508 08/05/24  0444 08/04/24  0615 08/03/24  0605   SODIUM mmol/L 141 144 143 144 145 143   POTASSIUM mmol/L 3.8 3.9 4.1 3.9 3.9 3.8   CHLORIDE mmol/L 109* 110* 109* 112* 111* 110*   CO2 mmol/L 21.0* 20.0* 22.0 21.0* 21.0* 19.0*   BUN mg/dL 15 15 18 20 24* 31*   CREATININE mg/dL 1.42* 1.50* 1.47* 1.47* 1.60* 1.82*   CALCIUM mg/dL 8.5* 8.6 8.4* 8.3* 8.5* 8.3*   BILIRUBIN mg/dL  --   --   --  0.3 0.3 0.3   ALK PHOS U/L  --   --   --  102 95 88   ALT (SGPT) U/L  --   --   --  9 9 7   AST (SGOT) U/L  --   --   --  16 15 12   GLUCOSE mg/dL 110* 97 101* 89 97 125*         Microbiology Results (last 10 days)       Procedure Component Value - Date/Time    Wound Culture - Drainage, Foot, Left [[id number]]  (Abnormal) Collected: 08/02/24 1338    Lab Status: Final result Specimen: Drainage from Foot, Left Updated: 08/04/24 0805      Wound Culture Light growth (2+) Streptococcus agalactiae (Group B)     Comment:   This organism is considered to be universally susceptible to penicillin.  No further antibiotic testing will be performed. If Clindamycin or Erythromycin is the drug of choice, notify the laboratory within 7 days to request susceptibility testing.        Gram Stain Rare (1+) WBCs seen      No organisms seen    Wound Culture - Wound, Foot, Left [[id number]]  (Abnormal) Collected: 08/02/24 1032    Lab Status: Final result Specimen: Wound from Foot, Left Updated: 08/04/24 0857     Wound Culture Light growth (2+) Streptococcus agalactiae (Group B)     Comment:   This organism is considered to be universally susceptible to penicillin.  No further antibiotic testing will be performed. If Clindamycin or Erythromycin is the drug of choice, notify the laboratory within 7 days to request susceptibility testing.        Gram Stain Few (2+) WBCs seen      Rare (1+) Gram positive cocci    Eosinophil Smear - Urine, Urine, Clean Catch [[id number]]  (Normal) Collected: 08/01/24 1547    Lab Status: Final result Specimen: Urine, Clean Catch Updated: 08/02/24 0149     Eosinophil Smear 0 % EOS/100 Cells     MRSA Screen, PCR (Inpatient) - Swab, Nares [[id number]]  (Normal) Collected: 08/01/24 0206    Lab Status: Final result Specimen: Swab from Nares Updated: 08/01/24 0346     MRSA PCR No MRSA Detected    Narrative:      The negative predictive value of this diagnostic test is high and should only be used to consider de-escalating anti-MRSA therapy. A positive result may indicate colonization with MRSA and must be correlated clinically.    Urine Culture - Urine, Straight Cath [[id number]]  (Abnormal)  (Susceptibility) Collected: 07/31/24 1851    Lab Status: Final result Specimen: Urine from Straight Cath Updated: 08/04/24 1129     Urine Culture >100,000 CFU/mL Escherichia coli ESBL    Narrative:      Colonization of the urinary tract without infection is  common. Treatment is discouraged unless the patient is symptomatic, pregnant, or undergoing an invasive urologic procedure.  Recent outcomes data supports the use of pip/tazo in the treatment of susceptible ESBL infections for uncomplicated UTI. Consider use of pip/tazo as a carbapenem-sparing regimen in applicable patients.    Susceptibility        Escherichia coli ESBL      MATT      Amikacin Susceptible      Ertapenem Susceptible      Gentamicin Resistant      Levofloxacin Susceptible      Meropenem Susceptible      Nitrofurantoin Susceptible      Piperacillin + Tazobactam Susceptible      Tobramycin Resistant      Trimethoprim + Sulfamethoxazole Resistant                           Blood Culture - Blood, Hand, Left [[id number]]  (Normal) Collected: 07/31/24 1849    Lab Status: Final result Specimen: Blood from Hand, Left Updated: 08/05/24 1915     Blood Culture No growth at 5 days    Blood Culture - Blood, Arm, Left [[id number]]  (Abnormal) Collected: 07/31/24 1849    Lab Status: Final result Specimen: Blood from Arm, Left Updated: 08/02/24 0545     Blood Culture Staphylococcus, coagulase negative     Isolated from Aerobic Bottle     Gram Stain Aerobic Bottle Gram positive cocci in clusters    Narrative:      Probable contaminant requires clinical correlation, susceptibility not performed unless requested by physician.      Blood Culture ID, PCR - Blood, Arm, Left [[id number]]  (Abnormal) Collected: 07/31/24 1849    Lab Status: Final result Specimen: Blood from Arm, Left Updated: 08/01/24 1117     BCID, PCR Staph spp, not aureus or lugdunensis. Identification by BCID2 PCR.     BOTTLE TYPE Aerobic Bottle            PATHOLOGY RESULTS:       ASSESSMENT/PLAN   Diabetic foot ulcerations to the left foot  Type 2 diabetes mellitus with hyperglycemia  Diabetic polyneuropathy    Review of labs, imaging, and other provider documentation  Comprehensive lower extremity examination and evaluation was performed.    Linear foreign  bodies projected over the soft tissues of the second  toe and first toe were noted upon xray imaging (artifacts were also included in the differential). No evidence of soft tissue damage, FB entry, or infection noted to either great or second toes today.      Continue wound care orders- Betadine wet-to-dry dressing to be performed twice daily per nursing.     No surgical interventions are planned at this time from a podiatric standpoint.     Due to difficulty caring for foot wounds at home, our recommendation would be for the patient to go to a skilled nursing facility for ongoing wound care upon discharge.       Thank you kindly for the consult, will continue to follow patient while in house.      This document has been electronically signed by SUSAN Luna on 2024 16:46 CDT            Electronically signed by Asad Cerrato DPM at 24 1658          Physical Therapy Notes (most recent note)        Alesha Dominguez PTA at 24 1409  Version 1 of 1         Acute Care - Physical Therapy Treatment Note  [facility]     Patient Name: Erick Luong  : 1955  MRN: [id number]  Today's Date: 2024      Visit Dx:     ICD-10-CM ICD-9-CM   1. Diabetic foot infection  E11.628 250.80    L08.9 686.9   2. Osteomyelitis of foot, unspecified laterality, unspecified type  M86.9 730.27   3. Diabetic ketoacidosis without coma associated with type 2 diabetes mellitus  E11.10 250.12   4. Elevated troponin  R79.89 790.6   5. Atrial fibrillation with RVR  I48.91 427.31   6. Confusion  R41.0 298.9   7. Impaired mobility [Z74.09]  Z74.09 799.89     Patient Active Problem List   Diagnosis    Obesity, unspecified obesity severity, unspecified obesity type    Essential hypertension    Type 2 diabetes mellitus with hyperglycemia, with long-term current use of insulin    Nonsmoker    Anemia due to chronic kidney disease    Class 3 severe obesity due to excess calories with body mass index (BMI) of 40.0 to  44.9 in adult    Anasarca    Sleep apnea with use of continuous positive airway pressure (CPAP)    Medically noncompliant    Diabetic ulcer of left foot associated with type 2 diabetes mellitus    Diabetic polyneuropathy associated with type 2 diabetes mellitus    Spinal stenosis in cervical region    Degeneration of cervical intervertebral disc    Cervical radiculopathy    Degeneration of lumbar or lumbosacral intervertebral disc    Cervical myelopathy    Bilateral carpal tunnel syndrome    CAD (coronary artery disease)    GERD without esophagitis    BPH without obstruction/lower urinary tract symptoms    Stage 3b chronic kidney disease    Chronic diastolic heart failure    Type 2 myocardial infarction due to heart failure    Left carpal tunnel syndrome    Syncope and collapse, recurrent episodes    Poorly-controlled hypertension    Rhinovirus    Peripheral vascular disease    Chronic kidney disease (CKD), stage IV (severe)    Diabetic foot infection    Sepsis    Epigastric pain    Chronic heart failure with preserved ejection fraction (HFpEF)    Sepsis due to methicillin resistant Staphylococcus aureus (MRSA) with encephalopathy without septic shock    Slow transit constipation    CHF exacerbation    CHF (congestive heart failure), NYHA class I, acute on chronic, combined    Rectal bleeding    Acute on chronic heart failure with preserved ejection fraction (HFpEF)    DKA, type 2, not at goal    Atrial fibrillation    E. coli UTI, ESBL     Past Medical History:   Diagnosis Date    Arthritis     Autonomic disease     CAD (coronary artery disease) 02/06/2017    Cervical radiculopathy 09/16/2021    Chronic constipation with acute exaccerbation 05/10/2021    Coronary artery disease     Degeneration of cervical intervertebral disc 08/11/2021    Diabetes mellitus     Diabetic foot ulcer 08/31/2020    Diabetic polyneuropathy associated with type 2 diabetes mellitus 01/18/2021    Elevated cholesterol     Gastroesophageal  reflux disease 05/13/2019    Headache     HTN (hypertension), benign 05/03/2017    Hyperlipidemia     Hypertension     Mixed hyperlipidemia 02/07/2017    Multiple lung nodules 01/26/2020    5mm, 9 mm RLL identified 1/2020, not present 10/2019.    Myocardial infarction     Osteomyelitis 01/22/2020    Osteomyelitis of fifth toe of right foot 10/07/2019    Pancreatitis     Persistent insomnia 01/20/2020    Renal disorder     Sleep apnea 02/06/2017    Sleep apnea with use of continuous positive airway pressure (CPAP)     NON-COMPLIANT    Slow transit constipation 01/16/2019    Spinal stenosis in cervical region 09/16/2021    Vitamin D deficiency 03/02/2021     Past Surgical History:   Procedure Laterality Date    ABDOMINAL SURGERY      AMPUTATION FOOT / TOE Left 10/2021    5th digit     ANTERIOR CERVICAL DISCECTOMY W/ FUSION N/A 08/05/2022    Procedure: CERVICAL DISCECTOMY ANTERIOR WITH FUSION C5-6 with possible plating of C5-7 with neuromonitoring and 1 c-arm;  Surgeon: Karel Soliz MD;  Location: [facility] OR;  Service: Neurosurgery;  Laterality: N/A;    APPENDECTOMY      BACK SURGERY      CARDIAC CATHETERIZATION Left 02/08/2021    Procedure: Left Heart Cath w poss intervention left anatomical snuff box acess;  Surgeon: Omkar Charles DO;  Location: [facility] CATH INVASIVE LOCATION;  Service: Cardiology;  Laterality: Left;    CARDIAC SURGERY      CATARACT EXTRACTION      CERVICAL SPINE SURGERY      COLONOSCOPY N/A 01/31/2017    Normal exam repeat in 5 years    COLONOSCOPY N/A 02/11/2019    Mild acute inflammation    COLONOSCOPY N/A 04/07/2024    2 areas at 10 and 20 cm with friability ulceration 2 clips placed at 20 cm and 4 clips at 10 cm poor prep normal mucosa,mild eroisions and ulcerations in visible vessels    COLONOSCOPY N/A 7/1/2024    Procedure: COLONOSCOPY WITH ANESTHESIA;  Surgeon: Arsalan Lorenzo DO;  Location: [facility] ENDOSCOPY;  Service: Gastroenterology;  Laterality: N/A;  pre op  constipation/diarrhea  post poor prep  pcp Del Shetty    COLONOSCOPY N/A 7/2/2024    Procedure: COLONOSCOPY WITH ANESTHESIA;  Surgeon: Agapito Christopher MD;  Location: [facility] ENDOSCOPY;  Service: Gastroenterology;  Laterality: N/A;  pre rectal bleeding  post poor prep  pcp Del Shetty MD    COLONOSCOPY W/ POLYPECTOMY  03/04/2014    Hyperplastic polyp    CORONARY ARTERY BYPASS GRAFT  10/2015    ENDOSCOPY  04/13/2011    Gastritis with hemorrhage    ENDOSCOPY N/A 05/05/2017    Normal exam    ENDOSCOPY N/A 02/11/2019    Gastritis    ENDOSCOPY N/A 09/01/2020    Non-erosive gastritis with hemorrhage    ENDOSCOPY N/A 02/10/2021    Esophagitis    ENDOSCOPY N/A 04/11/2024    There were esophageal mucosal changes suspicious for short-segment Low's esophagus present in the distal esophagus. The maximum longitudinal extent of these mucosal changes was 2 cm in length. Mucosa was biopsied with a cold forceps for histologyDistal esophagus, biopsies: Mild chronic active esophagogastritis. No evidence of intestinal metaplasia, dysplasia. Antrum, bx, Mild chronic gastritis    FOOT SURGERY Left     INCISION AND DRAINAGE OF WOUND Left 09/2007    spider bite     PT Assessment (Last 12 Hours)       PT Evaluation and Treatment       Row Name 08/08/24 1059          Physical Therapy Time and Intention    Subjective Information complains of;fatigue  -LY     Document Type therapy note (daily note)  -LY     Mode of Treatment physical therapy  -LY       Row Name 08/08/24 1059          General Information    Existing Precautions/Restrictions fall;other (see comments)  Left foot wound, surgical shoe  -LY       Row Name 08/08/24 1059          Pain    Pretreatment Pain Rating 6/10  -LY     Posttreatment Pain Rating 6/10  -LY     Pain Location - Side/Orientation Left  -LY     Pain Location - foot  -LY     Pain Intervention(s) Medication (See MAR);Ambulation/increased activity  -LY       Row Name 08/08/24 1059          Bed Mobility     Supine-Sit [place] (Bed Mobility) supervision  -LY       Row Name 08/08/24 1059          Sit-Stand Transfer    Sit-Stand [place] (Transfers) verbal cues;contact guard  -LY       Row Name 08/08/24 1059          Stand-Sit Transfer    Stand-Sit [place] (Transfers) verbal cues;contact guard  -LY       Row Name 08/08/24 1059          Gait/Stairs (Locomotion)    [place] Level (Gait) verbal cues;contact guard  -LY     Assistive Device (Gait) walker, front-wheeled  -LY     Distance in Feet (Gait) 50  x4  -LY     Pattern (Gait) step-through  -LY     Deviations/Abnormal Patterns (Gait) gait speed decreased;stride length decreased  -LY     Bilateral Gait Deviations forward flexed posture;heel strike decreased  -LY       Row Name             Wound 08/22/23 0140 Left posterior plantar    Wound - Properties Group Placement Date: 08/22/23  -AM Placement Time: 0140  -AM Present on Original Admission: Y  -AM Side: Left  -AM Orientation: posterior  -AM Location: plantar  -AM    Retired Wound - Properties Group Placement Date: 08/22/23  -AM Placement Time: 0140  -AM Present on Original Admission: Y  -AM Side: Left  -AM Orientation: posterior  -AM Location: plantar  -AM    Retired Wound - Properties Group Date first assessed: 08/22/23  -AM Time first assessed: 0140  -AM Present on Original Admission: Y  -AM Side: Left  -AM Location: plantar  -AM      Row Name             Wound Left lateral foot Diabetic Ulcer    Wound - Properties Group Side: Left  -MH Orientation: lateral  -MH Location: foot  -JACIEL, 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;diabetic foot ulcer/gangrene  Primary Wound Type: Diabetic ulc  -JACIEL Wound Outcome: Amputation  -JACIEL Stage, Pressure Injury : other (see comments)  -JACIEL, full thickness starting 10/20/21     Retired Wound - Properties Group Side: Left  -MH Orientation: lateral  -MH Location: foot  -JACIEL, 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;diabetic foot ulcer/gangrene  Primary Wound Type: Diabetic ulc  -JACIEL Stage, Pressure Injury :  other (see comments)  -JACIEL, full thickness starting 10/20/21  Wound Outcome: Amputation  -JACIEL    Retired Wound - Properties Group Side: Left  -MH Location: foot  -JACIEL, 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;diabetic foot ulcer/gangrene  Primary Wound Type: Diabetic ulc  -JACIEL Wound Outcome: Amputation  -JACIEL      Row Name             Wound 06/15/23 0102 Left anterior plantar    Wound - Properties Group Placement Date: 06/15/23  -SM Placement Time: 0102 -SM Side: Left  -SM Orientation: anterior  -SM Location: plantar  -SM    Retired Wound - Properties Group Placement Date: 06/15/23  -SM Placement Time: 0102 -SM Side: Left  -SM Orientation: anterior  -SM Location: plantar  -SM    Retired Wound - Properties Group Date first assessed: 06/15/23  -SM Time first assessed: 0102 -SM Side: Left  -SM Location: plantar  -SM      Row Name 08/08/24 1059          Plan of Care Review    Plan of Care Reviewed With patient  -LY     Progress improving  -LY       Row Name 08/08/24 1059          Positioning and Restraints    Pre-Treatment Position in bed  -LY     Post Treatment Position bed  -LY     In Bed sitting EOB;call light within reach;encouraged to call for assist  -LY               User Key  (r) = Recorded By, (t) = Taken By, (c) = Cosigned By      Initials Name Provider Type    Yuliana Lisa RN Registered Nurse    MH Haase, Mallory L, RN Registered Nurse    Alesha Armenta, PTA Physical Therapist Assistant    Faviola Kunz RN Registered Nurse    Michelle Diaz RN Registered Nurse                    Physical Therapy Education       Title: PT OT SLP Therapies (In Progress)       Topic: Physical Therapy (Done)       Point: Mobility training (Done)       Learning Progress Summary             Patient Acceptance, E,D, SAEID,VU by  at 8/4/2024 2371    Comment: benefits of PT and POC, call for A OOB                         Point: Precautions (Done)       Learning Progress Summary             Patient Acceptance, E,D, DU,VU by   at 8/4/2024 1457    Comment: benefits of PT and POC, call for A OOB                                         User Key       Initials Effective Dates Name Provider Type Discipline     02/03/23 -  Daniel Garcia, PT Physical Therapist PT                  PT Recommendation and Plan  Anticipated Discharge Disposition (PT): skilled nursing facility  Plan of Care Reviewed With: patient  Progress: improving  Outcome Evaluation: PT tx completed. Pt in bed and agrees to therapy. S for bed mobility. Required assist for donning of L surgical shoe. No c/o pain. Pt stands with CGA. Amb 50' at a time with RWX and CGA. As pt fatigeus he requires more cues for safe gait mechanics. Noted L decreased foot clearance at times. Will cont to follow.       Time Calculation:    PT Charges       Row Name 08/08/24 1408             Time Calculation    Start Time 1059  -LY      Stop Time 1112  -LY      Time Calculation (min) 13 min  -LY      PT Received On 08/08/24  -LY         Time Calculation- PT    Total Timed Code Minutes- PT 13 minute(s)  -LY         Timed Charges    41355 - Gait Training Minutes  13  -LY         Total Minutes    Timed Charges Total Minutes 13  -LY       Total Minutes 13  -LY                User Key  (r) = Recorded By, (t) = Taken By, (c) = Cosigned By      Initials Name Provider Type    LY Alesha Dominguez PTA Physical Therapist Assistant                  Therapy Charges for Today       Code Description Service Date Service Provider Modifiers Qty    77492758307 HC GAIT TRAINING EA 15 MIN 8/7/2024 Alesha Dominguez PTA GP 1    91777083874 HC GAIT TRAINING EA 15 MIN 8/8/2024 Alesha Dominguez PTA GP 1            PT G-Codes  Outcome Measure Options: AM-PAC 6 Clicks Daily Activity (OT)  AM-PAC 6 Clicks Score (PT): 18  AM-PAC 6 Clicks Score (OT): 15    Alesha Dominguez PTA  8/8/2024      Electronically signed by Alesha Dominguez PTA at 08/08/24 1409          Occupational Therapy Notes (most recent note)        Thien Conner,  OTR/L at 24 1318          Patient Name: Erick Luong  : 1955    MRN: [id number]                              Today's Date: 2024       Admit Date: 2024    Visit Dx:     ICD-10-CM ICD-9-CM   1. Diabetic foot infection  E11.628 250.80    L08.9 686.9   2. Osteomyelitis of foot, unspecified laterality, unspecified type  M86.9 730.27   3. Diabetic ketoacidosis without coma associated with type 2 diabetes mellitus  E11.10 250.12   4. Elevated troponin  R79.89 790.6   5. Atrial fibrillation with RVR  I48.91 427.31   6. Confusion  R41.0 298.9   7. Impaired mobility [Z74.09]  Z74.09 799.89     Patient Active Problem List   Diagnosis    Obesity, unspecified obesity severity, unspecified obesity type    Essential hypertension    Type 2 diabetes mellitus with hyperglycemia, with long-term current use of insulin    Nonsmoker    Anemia due to chronic kidney disease    Class 3 severe obesity due to excess calories with body mass index (BMI) of 40.0 to 44.9 in adult    [place]    Sleep apnea with use of continuous positive airway pressure (CPAP)    Medically noncompliant    Diabetic ulcer of left foot associated with type 2 diabetes mellitus    Diabetic polyneuropathy associated with type 2 diabetes mellitus    Spinal stenosis in cervical region    Degeneration of cervical intervertebral disc    Cervical radiculopathy    Degeneration of lumbar or lumbosacral intervertebral disc    Cervical myelopathy    Bilateral carpal tunnel syndrome    CAD (coronary artery disease)    GERD without esophagitis    BPH without obstruction/lower urinary tract symptoms    Stage 3b chronic kidney disease    Chronic diastolic heart failure    Type 2 myocardial infarction due to heart failure    Left carpal tunnel syndrome    Syncope and collapse, recurrent episodes    Poorly-controlled hypertension    Rhinovirus    Peripheral vascular disease    Chronic kidney disease (CKD), stage IV (severe)    Diabetic foot infection    Sepsis     Epigastric pain    Chronic heart failure with preserved ejection fraction (HFpEF)    Sepsis due to methicillin resistant Staphylococcus aureus (MRSA) with encephalopathy without septic shock    Slow transit constipation    CHF exacerbation    CHF (congestive heart failure), NYHA class I, acute on chronic, combined    Rectal bleeding    Acute on chronic heart failure with preserved ejection fraction (HFpEF)    DKA, type 2, not at goal    Atrial fibrillation    E. coli UTI, ESBL     Past Medical History:   Diagnosis Date    Arthritis     Autonomic disease     CAD (coronary artery disease) 02/06/2017    Cervical radiculopathy 09/16/2021    Chronic constipation with acute exaccerbation 05/10/2021    Coronary artery disease     Degeneration of cervical intervertebral disc 08/11/2021    Diabetes mellitus     Diabetic foot ulcer 08/31/2020    Diabetic polyneuropathy associated with type 2 diabetes mellitus 01/18/2021    Elevated cholesterol     Gastroesophageal reflux disease 05/13/2019    Headache     HTN (hypertension), benign 05/03/2017    Hyperlipidemia     Hypertension     Mixed hyperlipidemia 02/07/2017    Multiple lung nodules 01/26/2020    5mm, 9 mm RLL identified 1/2020, not present 10/2019.    Myocardial infarction     Osteomyelitis 01/22/2020    Osteomyelitis of fifth toe of right foot 10/07/2019    Pancreatitis     Persistent insomnia 01/20/2020    Renal disorder     Sleep apnea 02/06/2017    Sleep apnea with use of continuous positive airway pressure (CPAP)     NON-COMPLIANT    Slow transit constipation 01/16/2019    Spinal stenosis in cervical region 09/16/2021    Vitamin D deficiency 03/02/2021     Past Surgical History:   Procedure Laterality Date    ABDOMINAL SURGERY      AMPUTATION FOOT / TOE Left 10/2021    5th digit     ANTERIOR CERVICAL DISCECTOMY W/ FUSION N/A 08/05/2022    Procedure: CERVICAL DISCECTOMY ANTERIOR WITH FUSION C5-6 with possible plating of C5-7 with neuromonitoring and 1 c-arm;   Surgeon: Karel Soliz MD;  Location: [facility] OR;  Service: Neurosurgery;  Laterality: N/A;    APPENDECTOMY      BACK SURGERY      CARDIAC CATHETERIZATION Left 02/08/2021    Procedure: Left Heart Cath w poss intervention left anatomical snuff box acess;  Surgeon: Omkar Charles DO;  Location: [facility] CATH INVASIVE LOCATION;  Service: Cardiology;  Laterality: Left;    CARDIAC SURGERY      CATARACT EXTRACTION      CERVICAL SPINE SURGERY      COLONOSCOPY N/A 01/31/2017    Normal exam repeat in 5 years    COLONOSCOPY N/A 02/11/2019    Mild acute inflammation    COLONOSCOPY N/A 04/07/2024    2 areas at 10 and 20 cm with friability ulceration 2 clips placed at 20 cm and 4 clips at 10 cm poor prep normal mucosa,mild eroisions and ulcerations in visible vessels    COLONOSCOPY N/A 7/1/2024    Procedure: COLONOSCOPY WITH ANESTHESIA;  Surgeon: Arsalan Lorenzo DO;  Location: [facility] ENDOSCOPY;  Service: Gastroenterology;  Laterality: N/A;  pre op constipation/diarrhea  post poor prep  pcp Del Shetty    COLONOSCOPY N/A 7/2/2024    Procedure: COLONOSCOPY WITH ANESTHESIA;  Surgeon: Agapito Christopher MD;  Location: [facility] ENDOSCOPY;  Service: Gastroenterology;  Laterality: N/A;  pre rectal bleeding  post poor prep  pcp Del Shetty MD    COLONOSCOPY W/ POLYPECTOMY  03/04/2014    Hyperplastic polyp    CORONARY ARTERY BYPASS GRAFT  10/2015    ENDOSCOPY  04/13/2011    Gastritis with hemorrhage    ENDOSCOPY N/A 05/05/2017    Normal exam    ENDOSCOPY N/A 02/11/2019    Gastritis    ENDOSCOPY N/A 09/01/2020    Non-erosive gastritis with hemorrhage    ENDOSCOPY N/A 02/10/2021    Esophagitis    ENDOSCOPY N/A 04/11/2024    There were esophageal mucosal changes suspicious for short-segment Low's esophagus present in the distal esophagus. The maximum longitudinal extent of these mucosal changes was 2 cm in length. Mucosa was biopsied with a cold forceps for histologyDistal esophagus, biopsies: Mild chronic active  esophagogastritis. No evidence of intestinal metaplasia, dysplasia. Antrum, bx, Mild chronic gastritis    FOOT SURGERY Left     INCISION AND DRAINAGE OF WOUND Left 09/2007    spider bite      General Information       Row Name 08/08/24 1318          OT Time and Intention    Document Type therapy note (daily note)  -MM     Mode of Treatment occupational therapy  -MM       Row Name 08/08/24 1318          General Information    Patient Profile Reviewed yes  -MM     Existing Precautions/Restrictions fall;other (see comments)  Left foot wound, surgical shoe  -MM     Barriers to Rehab medically complex;physical barrier  -MM       Row Name 08/08/24 1318          Safety Issues, Functional Mobility    Impairments Affecting Function (Mobility) balance;endurance/activity tolerance;pain;strength;shortness of breath;sensation/sensory awareness  -MM               User Key  (r) = Recorded By, (t) = Taken By, (c) = Cosigned By      Initials Name Provider Type    MM Thien Conner, OTR/L Occupational Therapist                     Mobility/ADL's    No documentation.                  Obj/Interventions       Row Name 08/08/24 1318          Shoulder (Therapeutic Exercise)    Shoulder (Therapeutic Exercise) AROM (active range of motion)  -MM     Shoulder AROM (Therapeutic Exercise) bilateral;flexion;extension;aBduction;aDduction;external rotation;internal rotation;horizontal aBduction/aDduction;scapular elevation;scapular protraction;scapular retraction;20 repititions  -MM       Row Name 08/08/24 1318          Elbow/Forearm (Therapeutic Exercise)    Elbow/Forearm (Therapeutic Exercise) AROM (active range of motion)  -MM     Elbow/Forearm AROM (Therapeutic Exercise) bilateral;flexion;extension;supination;pronation;20 repititions  -MM       [facility] Name 08/08/24 1318          Wrist (Therapeutic Exercise)    Wrist (Therapeutic Exercise) AROM (active range of motion)  -MM     Wrist AROM (Therapeutic Exercise) bilateral;flexion;extension;ulnar  "deviation;radial deviation;20 repititions  -MM       Row Name 08/08/24 1318          Hand (Therapeutic Exercise)    Hand (Therapeutic Exercise) AROM (active range of motion)  -MM     Hand AROM/AAROM (Therapeutic Exercise) bilateral;AROM (active range of motion);finger flexion;finger extension;20 repititions  -MM       Row Name 08/08/24 1318          Motor Skills    Therapeutic Exercise shoulder;elbow/forearm;wrist;hand  -MM               User Key  (r) = Recorded By, (t) = Taken By, (c) = Cosigned By      Initials Name Provider Type    MM Thien Conner, OTR/L Occupational Therapist                   Goals/Plan    No documentation.                  Clinical Impression       Row Name 08/08/24 1318          Pain Assessment    Pretreatment Pain Rating 8/10  -MM     Posttreatment Pain Rating 7/10  -MM     Pain Location - Side/Orientation Left  -MM     Pain Location - foot  -MM     Pain Intervention(s) Medication (See MAR);Repositioned;Ambulation/increased activity  -MM       Row Name 08/08/24 1318          Plan of Care Review    Plan of Care Reviewed With patient  -MM     Progress no change  -MM     Outcome Evaluation OT tx completed. Pt was asleep but easy to awaken, but remains drowsy. Pt agreeable to bed level activity as he reports he just finished working with PT. Pt reports pain \"7-8\"/10 in L foot. Pt completes BUE AROM HEP in all planes. 20 reps x1 set. 1lb dowel bar utilized as able. Pt reports increased difficulty with left shoulder flexion. Continue OT POC.  -MM       Row Name 08/08/24 1318          Therapy Plan Review/Discharge Plan (OT)    Anticipated Discharge Disposition (OT) skilled nursing facility  -MM       Row Name 08/08/24 1318          Positioning and Restraints    Pre-Treatment Position in bed  -MM     Post Treatment Position bed  -MM     In Bed side lying left;fowlers;call light within reach;encouraged to call for assist;side rails up x2  -MM               User Key  (r) = Recorded By, (t) = " Taken By, (c) = Cosigned By      Initials Name Provider Type    Thien Khan, OTR/L Occupational Therapist                   Outcome Measures       Row Name 08/08/24 1318          How much help from another is currently needed...    Putting on and taking off regular lower body clothing? 1  -MM     Bathing (including washing, rinsing, and drying) 2  -MM     Toileting (which includes using toilet bed pan or urinal) 2  -MM     Putting on and taking off regular upper body clothing 3  -MM     Taking care of personal grooming (such as brushing teeth) 3  -MM     Eating meals 4  -MM     AM-PAC 6 Clicks Score (OT) 15  -MM       Row Name 08/08/24 0824          How much help from another person do you currently need...    Turning from your back to your side while in flat bed without using bedrails? 4  -PB     Moving from lying on back to sitting on the side of a flat bed without bedrails? 3  -PB     Moving to and from a bed to a chair (including a wheelchair)? 3  -PB     Standing up from a chair using your arms (e.g., wheelchair, bedside chair)? 3  -PB     Climbing 3-5 steps with a railing? 2  -PB     To walk in hospital room? 3  -PB     AM-PAC 6 Clicks Score (PT) 18  -PB     Highest Level of Mobility Goal 6 --> Walk 10 steps or more  -PB       Row Name 08/08/24 1318          Functional Assessment    Outcome Measure Options AM-PAC 6 Clicks Daily Activity (OT)  -MM               User Key  (r) = Recorded By, (t) = Taken By, (c) = Cosigned By      Initials Name Provider Type    Vicki Cherry RN Registered Nurse    Thien Khan, OTR/L Occupational Therapist                    Occupational Therapy Education       Title: PT OT SLP Therapies (In Progress)       Topic: Occupational Therapy (In Progress)       Point: ADL training (Done)       Description:   Instruct learner(s) on proper safety adaptation and remediation techniques during self care or transfers.   Instruct in proper use of assistive devices.     "              Learning Progress Summary             Patient Acceptance, E, VU,NR by EC at 8/4/2024 1426                         Point: Home exercise program (In Progress)       Description:   Instruct learner(s) on appropriate technique for monitoring, assisting and/or progressing therapeutic exercises/activities.                  Learning Progress Summary             Patient Acceptance, E, NR by  at 8/8/2024 1335                         Point: Precautions (Done)       Description:   Instruct learner(s) on prescribed precautions during self-care and functional transfers.                  Learning Progress Summary             Patient Acceptance, E, VU,NR by  at 8/4/2024 1426                         Point: Body mechanics (Done)       Description:   Instruct learner(s) on proper positioning and spine alignment during self-care, functional mobility activities and/or exercises.                  Learning Progress Summary             Patient Acceptance, E, VU,NR by  at 8/4/2024 1426                                         User Key       Initials Effective Dates Name Provider Type Discipline     07/11/23 -  Thien Conner, OTR/L Occupational Therapist OT     10/13/23 -  Matilda Bryant OTR/L Occupational Therapist OT                  OT Recommendation and Plan     Plan of Care Review  Plan of Care Reviewed With: patient  Progress: no change  Outcome Evaluation: OT tx completed. Pt was asleep but easy to awaken, but remains drowsy. Pt agreeable to bed level activity as he reports he just finished working with PT. Pt reports pain \"7-8\"/10 in L foot. Pt completes BUE AROM HEP in all planes. 20 reps x1 set. 1lb dowel bar utilized as able. Pt reports increased difficulty with left shoulder flexion. Continue OT POC.     Time Calculation:         Time Calculation- OT       Row Name 08/08/24 1408 08/08/24 1318          Time Calculation- OT    OT Start Time -- 1318  -MM     OT Stop Time -- 1342  -MM     OT Time " Calculation (min) -- 24 min  -MM     Total Timed Code Minutes- OT -- 24 minute(s)  -MM     OT Received On -- 08/08/24  -MM        Timed Charges    90705 - OT Therapeutic Exercise Minutes -- 24  -MM     48113 - Gait Training Minutes  13  -LY --        Total Minutes    Timed Charges Total Minutes 13  -LY 24  -MM      Total Minutes 13  -LY 24  -MM               User Key  (r) = Recorded By, (t) = Taken By, (c) = Cosigned By      Initials Name Provider Type    MM Thien Conner, OTR/L Occupational Therapist    Alesha Armenta, PTA Physical Therapist Assistant                  Therapy Charges for Today       Code Description Service Date Service Provider Modifiers Qty    68159628804 HC OT THER PROC EA 15 MIN 8/8/2024 Thien Conner OTR/L GO 2                 Thien MELÉNDEZ. ALEXANDER Conner/L  8/8/2024    Electronically signed by Thien Conner OTR/L at 08/08/24 6837

## 2024-08-09 NOTE — THERAPY TREATMENT NOTE
Patient Name: Erick Luong  : 1955    MRN: [id number]                              Today's Date: 2024       Admit Date: 2024    Visit Dx:     ICD-10-CM ICD-9-CM   1. Diabetic foot infection  E11.628 250.80    L08.9 686.9   2. Osteomyelitis of foot, unspecified laterality, unspecified type  M86.9 730.27   3. Diabetic ketoacidosis without coma associated with type 2 diabetes mellitus  E11.10 250.12   4. Elevated troponin  R79.89 790.6   5. Atrial fibrillation with RVR  I48.91 427.31   6. Confusion  R41.0 298.9   7. Impaired mobility [Z74.09]  Z74.09 799.89     Patient Active Problem List   Diagnosis    Obesity, unspecified obesity severity, unspecified obesity type    Essential hypertension    Type 2 diabetes mellitus with hyperglycemia, with long-term current use of insulin    Nonsmoker    Anemia due to chronic kidney disease    Class 3 severe obesity due to excess calories with body mass index (BMI) of 40.0 to 44.9 in adult    Anasarca    Sleep apnea with use of continuous positive airway pressure (CPAP)    Medically noncompliant    Diabetic ulcer of left foot associated with type 2 diabetes mellitus    Diabetic polyneuropathy associated with type 2 diabetes mellitus    Spinal stenosis in cervical region    Degeneration of cervical intervertebral disc    Cervical radiculopathy    Degeneration of lumbar or lumbosacral intervertebral disc    Cervical myelopathy    Bilateral carpal tunnel syndrome    CAD (coronary artery disease)    GERD without esophagitis    BPH without obstruction/lower urinary tract symptoms    Stage 3b chronic kidney disease    Chronic diastolic heart failure    Type 2 myocardial infarction due to heart failure    Left carpal tunnel syndrome    Syncope and collapse, recurrent episodes    Poorly-controlled hypertension    Rhinovirus    Peripheral vascular disease    Chronic kidney disease (CKD), stage IV (severe)    Diabetic foot infection    Sepsis    Epigastric pain    Chronic  heart failure with preserved ejection fraction (HFpEF)    Sepsis due to methicillin resistant Staphylococcus aureus (MRSA) with encephalopathy without septic shock    Slow transit constipation    CHF exacerbation    CHF (congestive heart failure), NYHA class I, acute on chronic, combined    Rectal bleeding    Acute on chronic heart failure with preserved ejection fraction (HFpEF)    DKA, type 2, not at goal    Atrial fibrillation    E. coli UTI, ESBL     Past Medical History:   Diagnosis Date    Arthritis     Autonomic disease     CAD (coronary artery disease) 02/06/2017    Cervical radiculopathy 09/16/2021    Chronic constipation with acute exaccerbation 05/10/2021    Coronary artery disease     Degeneration of cervical intervertebral disc 08/11/2021    Diabetes mellitus     Diabetic foot ulcer 08/31/2020    Diabetic polyneuropathy associated with type 2 diabetes mellitus 01/18/2021    Elevated cholesterol     Gastroesophageal reflux disease 05/13/2019    Headache     HTN (hypertension), benign 05/03/2017    Hyperlipidemia     Hypertension     Mixed hyperlipidemia 02/07/2017    Multiple lung nodules 01/26/2020    5mm, 9 mm RLL identified 1/2020, not present 10/2019.    Myocardial infarction     Osteomyelitis 01/22/2020    Osteomyelitis of fifth toe of right foot 10/07/2019    Pancreatitis     Persistent insomnia 01/20/2020    Renal disorder     Sleep apnea 02/06/2017    Sleep apnea with use of continuous positive airway pressure (CPAP)     NON-COMPLIANT    Slow transit constipation 01/16/2019    Spinal stenosis in cervical region 09/16/2021    Vitamin D deficiency 03/02/2021     Past Surgical History:   Procedure Laterality Date    ABDOMINAL SURGERY      AMPUTATION FOOT / TOE Left 10/2021    5th digit     ANTERIOR CERVICAL DISCECTOMY W/ FUSION N/A 08/05/2022    Procedure: CERVICAL DISCECTOMY ANTERIOR WITH FUSION C5-6 with possible plating of C5-7 with neuromonitoring and 1 c-arm;  Surgeon: Karel Soliz MD;   Location: [facility] OR;  Service: Neurosurgery;  Laterality: N/A;    APPENDECTOMY      BACK SURGERY      CARDIAC CATHETERIZATION Left 02/08/2021    Procedure: Left Heart Cath w poss intervention left anatomical snuff box acess;  Surgeon: Omkar Charles DO;  Location: [facility] CATH INVASIVE LOCATION;  Service: Cardiology;  Laterality: Left;    CARDIAC SURGERY      CATARACT EXTRACTION      CERVICAL SPINE SURGERY      COLONOSCOPY N/A 01/31/2017    Normal exam repeat in 5 years    COLONOSCOPY N/A 02/11/2019    Mild acute inflammation    COLONOSCOPY N/A 04/07/2024    2 areas at 10 and 20 cm with friability ulceration 2 clips placed at 20 cm and 4 clips at 10 cm poor prep normal mucosa,mild eroisions and ulcerations in visible vessels    COLONOSCOPY N/A 7/1/2024    Procedure: COLONOSCOPY WITH ANESTHESIA;  Surgeon: Arsalan Lorenzo DO;  Location: [facility] ENDOSCOPY;  Service: Gastroenterology;  Laterality: N/A;  pre op constipation/diarrhea  post poor prep  pcp Del Shetty    COLONOSCOPY N/A 7/2/2024    Procedure: COLONOSCOPY WITH ANESTHESIA;  Surgeon: Agapito Christopher MD;  Location: [facility] ENDOSCOPY;  Service: Gastroenterology;  Laterality: N/A;  pre rectal bleeding  post poor prep  pcp Del Shetty MD    COLONOSCOPY W/ POLYPECTOMY  03/04/2014    Hyperplastic polyp    CORONARY ARTERY BYPASS GRAFT  10/2015    ENDOSCOPY  04/13/2011    Gastritis with hemorrhage    ENDOSCOPY N/A 05/05/2017    Normal exam    ENDOSCOPY N/A 02/11/2019    Gastritis    ENDOSCOPY N/A 09/01/2020    Non-erosive gastritis with hemorrhage    ENDOSCOPY N/A 02/10/2021    Esophagitis    ENDOSCOPY N/A 04/11/2024    There were esophageal mucosal changes suspicious for short-segment Low's esophagus present in the distal esophagus. The maximum longitudinal extent of these mucosal changes was 2 cm in length. Mucosa was biopsied with a cold forceps for histologyDistal esophagus, biopsies: Mild chronic active esophagogastritis. No evidence of  intestinal metaplasia, dysplasia. Antrum, bx, Mild chronic gastritis    FOOT SURGERY Left     INCISION AND DRAINAGE OF WOUND Left 09/2007    spider bite      General Information       Row Name 08/09/24 1500          OT Time and Intention    Document Type therapy note (daily note)  -MM     Mode of Treatment occupational therapy  -MM       Row Name 08/09/24 1500          General Information    Patient Profile Reviewed yes  -MM     Existing Precautions/Restrictions fall;other (see comments)  left foot wound, surgical shoe  -MM     Barriers to Rehab medically complex;physical barrier  -MM       Row Name 08/09/24 1500          Safety Issues, Functional Mobility    Impairments Affecting Function (Mobility) balance;endurance/activity tolerance;pain;strength;shortness of breath;sensation/sensory awareness  -MM               User Key  (r) = Recorded By, (t) = Taken By, (c) = Cosigned By      Initials Name Provider Type    MM Thien Conner, OTR/L Occupational Therapist                     Mobility/ADL's       Row Name 08/09/24 1500          Bed Mobility    Bed Mobility supine-sit  -MM     Supine-Sit [place] (Bed Mobility) standby assist  -MM       Row Name 08/09/24 1500          Transfers    Transfers sit-stand transfer;stand-sit transfer  -MM       Row Name 08/09/24 1500          Sit-Stand Transfer    Sit-Stand [place] (Transfers) contact guard;verbal cues  -MM       Row Name 08/09/24 1500          Stand-Sit Transfer    Stand-Sit [place] (Transfers) contact guard;verbal cues  -[employer] Name 08/09/24 1500          Functional Mobility    Functional Mobility- Ind. Level contact guard assist;verbal cues required  -MM     Functional Mobility- Device walker, front-wheeled  -MM     Functional Mobility- Comment bed to ruffin to chair  -MM       Row Name 08/09/24 1500          Activities of Daily Living    BADL Assessment/Intervention lower body dressing  -MM       Row Name 08/09/24 1500          Lower Body  Dressing Assessment/Training    [place] Level (Lower Body Dressing) don;doff;shoes/slippers;maximum assist (25% patient effort);verbal cues;set up  surgical shoe on left foot and adjust right sock  -MM     Position (Lower Body Dressing) edge of bed sitting;unsupported sitting;supported sitting  -MM               User Key  (r) = Recorded By, (t) = Taken By, (c) = Cosigned By      Initials Name Provider Type    MM Thien Conner, OTR/L Occupational Therapist                   Obj/Interventions       Row Name 08/09/24 1500          Shoulder (Therapeutic Exercise)    Shoulder (Therapeutic Exercise) AROM (active range of motion)  -MM     Shoulder AROM (Therapeutic Exercise) bilateral;aBduction;aDduction;external rotation;internal rotation;horizontal aBduction/aDduction;scapular elevation;scapular protraction;scapular retraction;sitting;20 repititions  -MM       [facility] Name 08/09/24 1500          Elbow/Forearm (Therapeutic Exercise)    Elbow/Forearm (Therapeutic Exercise) AROM (active range of motion)  -MM     Elbow/Forearm AROM (Therapeutic Exercise) bilateral;flexion;extension;supination;pronation;sitting;20 repititions  -MM       [facility] Name 08/09/24 1500          Wrist (Therapeutic Exercise)    Wrist (Therapeutic Exercise) AROM (active range of motion)  -MM     Wrist AROM (Therapeutic Exercise) bilateral;flexion;extension;ulnar deviation;radial deviation;20 repititions  -MM       [facility] Name 08/09/24 1500          Hand (Therapeutic Exercise)    Hand (Therapeutic Exercise) AROM (active range of motion)  -MM     Hand AROM/AAROM (Therapeutic Exercise) bilateral;AROM (active range of motion);finger flexion;finger extension;20 repititions  -MM       [facility] Name 08/09/24 1500          Motor Skills    Therapeutic Exercise shoulder;elbow/forearm;wrist;hand  -MM       [facility] Name 08/09/24 1500          Balance    Balance Assessment sitting static balance;sitting dynamic balance;standing static balance;standing dynamic balance  -MM      Static Sitting Balance independent  -MM     Dynamic Sitting Balance standby assist  -MM     Position, Sitting Balance unsupported;sitting edge of bed;supported;sitting in chair  -MM     Static Standing Balance contact guard  -MM     Dynamic Standing Balance contact guard;verbal cues  -MM     Position/Device Used, Standing Balance supported;walker, front-wheeled  -MM               User Key  (r) = Recorded By, (t) = Taken By, (c) = Cosigned By      Initials Name Provider Type    MM Thien Conner, OTR/L Occupational Therapist                   Goals/Plan    No documentation.                  Clinical Impression       Row Name 08/09/24 1500          Pain Assessment    Pretreatment Pain Rating 5/10  -MM     Posttreatment Pain Rating 8/10  with activity  -MM     Pain Location - Side/Orientation Left  -MM     Pain Location - foot  -MM     Pain Intervention(s) Medication (See MAR);Repositioned;Ambulation/increased activity  -MM       Row Name 08/09/24 1500          Plan of Care Review    Plan of Care Reviewed With patient  -MM     Progress no change  -MM     Outcome Evaluation OT tx completed. Pt was asleep but easy to awaken. Pt agreeable to therapy. Pt reports pain L foot 5/10 pre tx, 8/10 with activity. Pt reports pain is improving post tx. Pt was max A for LB dressing task. Pt was CGA for sit to stand t/f and functional mobility with RW for bed to ruffin to chair. Pt completes BUE AROM HEP in all planes except pt requests no shoulder flexion/extension d/t chronic pain in L shoulder. 20 reps x1 set, 3lb dowel bar utilized. Continue OT POC.  -MM       Row Name 08/09/24 1500          Therapy Plan Review/Discharge Plan (OT)    Anticipated Discharge Disposition (OT) skilled nursing facility  -MM       Row Name 08/09/24 1500          Positioning and Restraints    Pre-Treatment Position in bed  -MM     Post Treatment Position chair  -MM     In Chair reclined;call light within reach;encouraged to call for assist;legs  elevated;L heel elevated  -MM               User Key  (r) = Recorded By, (t) = Taken By, (c) = Cosigned By      Initials Name Provider Type    Thien Khan, OTR/L Occupational Therapist                   Outcome Measures       Row Name 08/09/24 1500          How much help from another is currently needed...    Putting on and taking off regular lower body clothing? 1  -MM     Bathing (including washing, rinsing, and drying) 2  -MM     Toileting (which includes using toilet bed pan or urinal) 2  -MM     Putting on and taking off regular upper body clothing 3  -MM     Taking care of personal grooming (such as brushing teeth) 3  -MM     Eating meals 4  -MM     AM-PAC 6 Clicks Score (OT) 15  -MM       Row Name 08/09/24 0904          How much help from another person do you currently need...    Turning from your back to your side while in flat bed without using bedrails? 4  -PB     Moving from lying on back to sitting on the side of a flat bed without bedrails? 4  -PB     Moving to and from a bed to a chair (including a wheelchair)? 3  -PB     Standing up from a chair using your arms (e.g., wheelchair, bedside chair)? 3  -PB     Climbing 3-5 steps with a railing? 2  -PB     To walk in hospital room? 3  -PB     AM-PAC 6 Clicks Score (PT) 19  -PB     Highest Level of Mobility Goal 6 --> Walk 10 steps or more  -PB       Row Name 08/09/24 1500          Functional Assessment    Outcome Measure Options AM-PAC 6 Clicks Daily Activity (OT)  -MM               User Key  (r) = Recorded By, (t) = Taken By, (c) = Cosigned By      Initials Name Provider Type    Vicki Cherry RN Registered Nurse    Thien Khan, OTR/L Occupational Therapist                    Occupational Therapy Education       Title: PT OT SLP Therapies (In Progress)       Topic: Occupational Therapy (In Progress)       Point: ADL training (In Progress)       Description:   Instruct learner(s) on proper safety adaptation and remediation  techniques during self care or transfers.   Instruct in proper use of assistive devices.                  Learning Progress Summary             Patient Acceptance, E, NR by MM at 8/9/2024 1526    Acceptance, E, VU,NR by EC at 8/4/2024 1426                         Point: Home exercise program (In Progress)       Description:   Instruct learner(s) on appropriate technique for monitoring, assisting and/or progressing therapeutic exercises/activities.                  Learning Progress Summary             Patient Acceptance, E, NR by MM at 8/9/2024 1526    Acceptance, E, NR by MM at 8/8/2024 1335                         Point: Precautions (In Progress)       Description:   Instruct learner(s) on prescribed precautions during self-care and functional transfers.                  Learning Progress Summary             Patient Acceptance, E, NR by MM at 8/9/2024 1526    Acceptance, E, VU,NR by EC at 8/4/2024 1426                         Point: Body mechanics (In Progress)       Description:   Instruct learner(s) on proper positioning and spine alignment during self-care, functional mobility activities and/or exercises.                  Learning Progress Summary             Patient Acceptance, E, NR by MM at 8/9/2024 1526    Acceptance, E, VU,NR by EC at 8/4/2024 1426                                         User Key       Initials Effective Dates Name Provider Type Discipline     07/11/23 -  Thien Conner, OTR/L Occupational Therapist OT     10/13/23 -  Matilda Bryant OTR/L Occupational Therapist OT                  OT Recommendation and Plan     Plan of Care Review  Plan of Care Reviewed With: patient  Progress: no change  Outcome Evaluation: OT tx completed. Pt was asleep but easy to awaken. Pt agreeable to therapy. Pt reports pain L foot 5/10 pre tx, 8/10 with activity. Pt reports pain is improving post tx. Pt was max A for LB dressing task. Pt was CGA for sit to stand t/f and functional mobility with RW for bed  to ruffin to chair. Pt completes BUE AROM HEP in all planes except pt requests no shoulder flexion/extension d/t chronic pain in L shoulder. 20 reps x1 set, 3lb dowel bar utilized. Continue OT POC.     Time Calculation:         Time Calculation- OT       Row Name 08/09/24 1452 08/09/24 1353          Time Calculation- OT    OT Start Time 1452  -MM --     OT Stop Time 1539  -MM --     OT Time Calculation (min) 47 min  -MM --     Total Timed Code Minutes- OT 42 minute(s)  -MM --     OT Non-Billable Time (min) 5 min  -MM --     OT Received On 08/09/24  -MM --        Timed Charges    15951 - OT Therapeutic Exercise Minutes 24  -MM --     14025 - Gait Training Minutes  -- 16  -LY     91295 - OT Therapeutic Activity Minutes 18  -MM --        Total Minutes    Timed Charges Total Minutes 42  -MM 16  -LY      Total Minutes 42  -MM 16  -LY               User Key  (r) = Recorded By, (t) = Taken By, (c) = Cosigned By      Initials Name Provider Type    MM Thien Conner, OTR/L Occupational Therapist    LY Alesha Dominguez, PTA Physical Therapist Assistant                  Therapy Charges for Today       Code Description Service Date Service Provider Modifiers Qty    77152538716 HC OT THER PROC EA 15 MIN 8/8/2024 Thien Conner OTR/L GO 2    57643281212 HC OT THER PROC EA 15 MIN 8/9/2024 Thien Conner OTR/L GO 2    63145124520 HC OT THERAPEUTIC ACT EA 15 MIN 8/9/2024 Thien Conner OTR/L GO 1                 ALEXANDER Noyola/EDVIN  8/9/2024

## 2024-08-09 NOTE — PROGRESS NOTES
Nephrology ([facility]) Progress Note      Patient:  Erick Luong  YOB: 1955  Date of Service: 8/9/2024  MRN: [id number]   Acct: [id number]   Primary Care Physician: Del Shetty MD  Advance Directive:   Code Status and Medical Interventions: CPR (Attempt to Resuscitate); Full Support   Ordered at: 08/01/24 0053     Level Of Support Discussed With:    Patient     Code Status (Patient has no pulse and is not breathing):    CPR (Attempt to Resuscitate)     Medical Interventions (Patient has pulse or is breathing):    Full Support     Admit Date: 7/31/2024       Hospital Day: 9  Referring Provider: Abebe Garzon DO      Patient personally seen and examined.  Complete chart including Consults, Notes, Operative Reports, Labs, Cardiology, and Radiology studies reviewed as able.        Subjective:  Erick Luong is a 69 y.o. male for whom we were consulted for evaluation and treatment of acute kidney injury. Baseline chronic kidney disease stage 3b. Baseline creatinine approximately 1.5-1.8. Follows in our office.  History of poorly controlled type 2 diabetes, hypertension, coronary artery disease, diabetic foot ulcer, obesity.  On 7/31 patient was found wandering at home, confused. Brought to ER for evaluation. Has a nonhealing left foot diabetic ulcer with serosanguineous drainage. Blood glucose level was >700 with A1c >12%. Initial creatinine was 2.67 and has steadily improved since he was admitted. Nephrology consulted on 8/01. Hospital course remarkable for improving renal function and improved blood glucose levels. Moved to medical floor    Today is awake and alert. No new complaints. No overnight issues. Urine output nonoliguric    Allergies:  Cefepime, Bactrim [sulfamethoxazole-trimethoprim], Vancomycin, Zolpidem, and Metronidazole    Home Meds:  Medications Prior to Admission   Medication Sig Dispense Refill Last Dose    ascorbic acid (VITAMIN C) 1000 MG tablet  Take 1 tablet by mouth Daily. 30 tablet 3     bumetanide (BUMEX) 1 MG tablet Take 1 tablet by mouth 2 (Two) Times a Day for 30 days. 60 tablet 0     busPIRone (BUSPAR) 10 MG tablet Take 1 tablet by mouth 2 (Two) Times a Day.       calcitriol (ROCALTROL) 0.5 MCG capsule Take 1 capsule by mouth Daily. 90 capsule 4     carvedilol (COREG) 3.125 MG tablet Take 1 tablet twice a day by oral route. 60 tablet 4     donepezil (ARICEPT) 10 MG tablet Take 1 tablet by mouth Daily. 90 tablet 1     DULoxetine (CYMBALTA) 60 MG capsule Take 1 capsule by mouth Daily. 90 capsule 4     famotidine (PEPCID) 20 MG tablet Take 1 tablet twice a day by oral route. 180 tablet 4     Insulin Glargine (Lantus SoloStar) 100 UNIT/ML injection pen Inject 20 Units under the skin into the appropriate area as directed every night at bedtime. 15 mL 3     Insulin Regular Human, Conc, (HumuLIN R) 500 UNIT/ML solution pen-injector CONCENTRATED injection Inject 40 Units under the skin into the appropriate area as directed 2 (Two) Times a Day Before Meals. Inject 40 units under the skin in the the appropriate area with regular meals AND 40 units with large meals.       Iron-Vitamin C (Vitron-C)  MG tablet Take 1 tablet twice a day by oral route. 60 tablet 2     levocetirizine (XYZAL) 5 MG tablet Take 1 tablet by mouth every day at bedtime. 30 tablet 2     melatonin 3 MG tablet Take 2 tablets by mouth At Night As Needed for Sleep. 30 tablet 0     oxyCODONE (ROXICODONE) 10 MG tablet Take 1 tablet by mouth 2 (Two) Times a Day As Needed. Must last 30 days per md. 55 tablet 0     pantoprazole (PROTONIX) 40 MG EC tablet Take 1 tablet by mouth Every 12 (Twelve) Hours. 180 tablet 4     pregabalin (LYRICA) 100 MG capsule Take 1 capsule by mouth Daily.       pregabalin (LYRICA) 100 MG capsule Take 2 capsules by mouth every night at bedtime.       rosuvastatin (CRESTOR) 10 MG tablet Take 1 tablet by mouth Every Night. 30 tablet 2     sucralfate (CARAFATE) 1 g  tablet Take 1 tablet by mouth 4 (Four) Times a Day before meals. 360 tablet 1     Diclofenac Sodium (VOLTAREN) 1 % gel gel Apply 2 g topically to the appropriate area as directed 4 (Four) Times a Day As Needed. 300 g 11     nitroglycerin (NITROSTAT) 0.4 MG SL tablet Place 1 tablet under the tongue Every 5 (Five) Minutes As Needed for Chest Pain. Take no more than 3 doses in 15 minutes.       polyethylene glycol (MIRALAX) 17 GM/SCOOP powder Take 17 g by mouth Daily As Needed (constipation).       sennosides-docusate (PERICOLACE) 8.6-50 MG per tablet Take 1 tablet by mouth Every Night. Obtain OTC          Medicines:  Current Facility-Administered Medications   Medication Dose Route Frequency Provider Last Rate Last Admin    ascorbic acid (VITAMIN C) tablet 1,000 mg  1,000 mg Oral Daily Reuben Garza APRN   1,000 mg at 08/09/24 0903    sennosides-docusate (PERICOLACE) 8.6-50 MG per tablet 2 tablet  2 tablet Oral BID Lamine Figueroa APRN   2 tablet at 08/09/24 0903    And    polyethylene glycol (MIRALAX) packet 17 g  17 g Oral Daily PRN Lamine Figueroa APRN        And    bisacodyl (DULCOLAX) EC tablet 5 mg  5 mg Oral Daily PRN Lamine Figueroa APRN        And    bisacodyl (DULCOLAX) suppository 10 mg  10 mg Rectal Daily PRN Lamine Figueroa APRN   10 mg at 08/02/24 0941    Calcium Replacement - Follow Nurse / BPA Driven Protocol   Does not apply PRN Abebe Garzon DO        dextrose (D50W) (25 g/50 mL) IV injection 10-50 mL  10-50 mL Intravenous Q15 Min PRN Abebe Garzon DO        dextrose (D50W) (25 g/50 mL) IV injection 25 g  25 g Intravenous Q15 Min PRN Lamine Figueroa APRN        dextrose (GLUTOSE) oral gel 15 g  15 g Oral Q15 Min PRN Lamine Figueroa APRN        donepezil (ARICEPT) tablet 10 mg  10 mg Oral Daily Reuben Garza APRN   10 mg at 08/09/24 0903    DULoxetine (CYMBALTA) DR capsule 60 mg  60 mg Oral Daily Reuben Garza APRN   60 mg at 08/09/24 0903    Enoxaparin Sodium (LOVENOX)  syringe 135 mg  1 mg/kg Subcutaneous Q12H Abebe Garzon DO   135 mg at 08/09/24 0904    famotidine (PEPCID) tablet 20 mg  20 mg Oral Q12H Reuben Garza APRN   20 mg at 08/09/24 0903    glucagon (GLUCAGEN) injection 1 mg  1 mg Intramuscular Q15 Min PRN Lamine Figueroa APRN        insulin glargine (LANTUS, SEMGLEE) injection 10 Units  10 Units Subcutaneous Daily Keren Jamison MD   10 Units at 08/09/24 0904    insulin regular (humuLIN R,novoLIN R) injection 3-14 Units  3-14 Units Subcutaneous 4x Daily AC & at Bedtime Keren Jamison MD   3 Units at 08/08/24 1804    Magnesium Standard Dose Replacement - Follow Nurse / BPA Driven Protocol   Does not apply PRN Abebe Garzon DO        melatonin tablet 2.5 mg  2.5 mg Oral Nightly Reuben Garza APRN   2.5 mg at 08/08/24 2022    nitroglycerin (NITROSTAT) SL tablet 0.4 mg  0.4 mg Sublingual Q5 Min PRN Lamine Figueroa APRN        ondansetron (ZOFRAN) injection 4 mg  4 mg Intravenous Q6H PRN Payam Keyes DO   4 mg at 08/08/24 0900    oxyCODONE (ROXICODONE) immediate release tablet 10 mg  10 mg Oral BID Lamine Figueroa APRN   10 mg at 08/09/24 0904    pantoprazole (PROTONIX) EC tablet 40 mg  40 mg Oral Q12H Reuben Garza APRN   40 mg at 08/09/24 0904    Pharmacy to Dose enoxaparin (LOVENOX)   Does not apply Continuous PRN Abebe Garzon DO        Phosphorus Replacement - Follow Nurse / BPA Driven Protocol   Does not apply PRN Abebe Garzon DO        polyethylene glycol (MIRALAX) packet 17 g  17 g Oral Daily Reuben Garza APRN   17 g at 08/09/24 0904    Potassium Replacement - Follow Nurse / BPA Driven Protocol   Does not apply PRN Abebe Garzon DO        pregabalin (LYRICA) capsule 100 mg  100 mg Oral Nightly Reuben Garza APRN   100 mg at 08/08/24 2022    pregabalin (LYRICA) capsule 50 mg  50 mg Oral Daily Reuben Garza APRN   50 mg at 08/09/24 0904    rosuvastatin (CRESTOR) tablet 10 mg  10 mg Oral Nightly  Reuben Garza APRN   10 mg at 08/08/24 2022    sodium bicarbonate tablet 650 mg  650 mg Oral TID Brian Lomas MD   650 mg at 08/09/24 0903    sodium chloride 0.9 % flush 10 mL  10 mL Intravenous Q12H Abebe Garzon,    10 mL at 08/08/24 2023    sodium chloride 0.9 % flush 10 mL  10 mL Intravenous PRN Abebe Garzon,         sodium chloride 0.9 % flush 10 mL  10 mL Intravenous Q12H Lamine Figueroa APRN   10 mL at 08/09/24 0905    sodium chloride 0.9 % flush 10 mL  10 mL Intravenous PRN Lamine Figueroa APRN        sodium chloride 0.9 % infusion 40 mL  40 mL Intravenous PRN Abebe Garzon,         sodium chloride 0.9 % infusion 40 mL  40 mL Intravenous PRN Lamine Figueroa APRN           Past Medical History:  Past Medical History:   Diagnosis Date    Arthritis     Autonomic disease     CAD (coronary artery disease) 02/06/2017    Cervical radiculopathy 09/16/2021    Chronic constipation with acute exaccerbation 05/10/2021    Coronary artery disease     Degeneration of cervical intervertebral disc 08/11/2021    Diabetes mellitus     Diabetic foot ulcer 08/31/2020    Diabetic polyneuropathy associated with type 2 diabetes mellitus 01/18/2021    Elevated cholesterol     Gastroesophageal reflux disease 05/13/2019    Headache     HTN (hypertension), benign 05/03/2017    Hyperlipidemia     Hypertension     Mixed hyperlipidemia 02/07/2017    Multiple lung nodules 01/26/2020    5mm, 9 mm RLL identified 1/2020, not present 10/2019.    Myocardial infarction     Osteomyelitis 01/22/2020    Osteomyelitis of fifth toe of right foot 10/07/2019    Pancreatitis     Persistent insomnia 01/20/2020    Renal disorder     Sleep apnea 02/06/2017    Sleep apnea with use of continuous positive airway pressure (CPAP)     NON-COMPLIANT    Slow transit constipation 01/16/2019    Spinal stenosis in cervical region 09/16/2021    Vitamin D deficiency 03/02/2021       Past Surgical History:  Past Surgical History:    Procedure Laterality Date    ABDOMINAL SURGERY      AMPUTATION FOOT / TOE Left 10/2021    5th digit     ANTERIOR CERVICAL DISCECTOMY W/ FUSION N/A 08/05/2022    Procedure: CERVICAL DISCECTOMY ANTERIOR WITH FUSION C5-6 with possible plating of C5-7 with neuromonitoring and 1 c-arm;  Surgeon: Karel Soliz MD;  Location: [facility] OR;  Service: Neurosurgery;  Laterality: N/A;    APPENDECTOMY      BACK SURGERY      CARDIAC CATHETERIZATION Left 02/08/2021    Procedure: Left Heart Cath w poss intervention left anatomical snuff box acess;  Surgeon: Omkar Charles DO;  Location: [facility] CATH INVASIVE LOCATION;  Service: Cardiology;  Laterality: Left;    CARDIAC SURGERY      CATARACT EXTRACTION      CERVICAL SPINE SURGERY      COLONOSCOPY N/A 01/31/2017    Normal exam repeat in 5 years    COLONOSCOPY N/A 02/11/2019    Mild acute inflammation    COLONOSCOPY N/A 04/07/2024    2 areas at 10 and 20 cm with friability ulceration 2 clips placed at 20 cm and 4 clips at 10 cm poor prep normal mucosa,mild eroisions and ulcerations in visible vessels    COLONOSCOPY N/A 7/1/2024    Procedure: COLONOSCOPY WITH ANESTHESIA;  Surgeon: Arsalan Lorenzo DO;  Location: [facility] ENDOSCOPY;  Service: Gastroenterology;  Laterality: N/A;  pre op constipation/diarrhea  post poor prep  pcp Del Shetty    COLONOSCOPY N/A 7/2/2024    Procedure: COLONOSCOPY WITH ANESTHESIA;  Surgeon: Agapito Christopher MD;  Location: [facility] ENDOSCOPY;  Service: Gastroenterology;  Laterality: N/A;  pre rectal bleeding  post poor prep  pcp Del Shetty MD    COLONOSCOPY W/ POLYPECTOMY  03/04/2014    Hyperplastic polyp    CORONARY ARTERY BYPASS GRAFT  10/2015    ENDOSCOPY  04/13/2011    Gastritis with hemorrhage    ENDOSCOPY N/A 05/05/2017    Normal exam    ENDOSCOPY N/A 02/11/2019    Gastritis    ENDOSCOPY N/A 09/01/2020    Non-erosive gastritis with hemorrhage    ENDOSCOPY N/A 02/10/2021    Esophagitis    ENDOSCOPY N/A 04/11/2024    There were  esophageal mucosal changes suspicious for short-segment Low's esophagus present in the distal esophagus. The maximum longitudinal extent of these mucosal changes was 2 cm in length. Mucosa was biopsied with a cold forceps for histologyDistal esophagus, biopsies: Mild chronic active esophagogastritis. No evidence of intestinal metaplasia, dysplasia. Antrum, bx, Mild chronic gastritis    FOOT SURGERY Left     INCISION AND DRAINAGE OF WOUND Left 2007    spider bite       Family History  Family History   Problem Relation Age of Onset    Colon cancer Father     Heart disease Father     Colon cancer Sister     Colon polyps Sister     Alzheimer's disease Mother     Coronary artery disease Sister     Coronary artery disease Sister        Social History  Social History     Socioeconomic History    Marital status:    Tobacco Use    Smoking status: Former     Current packs/day: 0.00     Types: Cigarettes     Quit date:      Years since quittin.6    Smokeless tobacco: Never    Tobacco comments:     smoked in [place]   Vaping Use    Vaping status: Never Used   Substance and Sexual Activity    Alcohol use: No    Drug use: No    Sexual activity: Defer       Review of Systems:  History obtained from chart review and the patient  General ROS: No fever or chills  Respiratory ROS: No cough, shortness of breath, wheezing  Cardiovascular ROS: No chest pain or palpitations  Gastrointestinal ROS: No abdominal pain or melena  Genito-Urinary ROS: No dysuria or hematuria  Psych ROS: No anxiety and depression  14 point ROS reviewed with the patient and negative except as noted above and in the HPI unless unable to obtain.    Objective:  Patient Vitals for the past 24 hrs:   BP Temp Temp src Pulse Resp SpO2 Weight   24 0808 152/77 97.6 °F (36.4 °C) Oral 81 16 96 % --   24 0523 -- -- -- -- -- -- 134 kg (295 lb 3.2 oz)   24 0433 140/64 98 °F (36.7 °C) Oral 70 16 94 % --   24 2020 139/75 98.3 °F  (36.8 °C) Oral 77 16 96 % --   08/08/24 1547 150/76 97.3 °F (36.3 °C) Oral 80 16 95 % --       Intake/Output Summary (Last 24 hours) at 8/9/2024 1027  Last data filed at 8/9/2024 0433  Gross per 24 hour   Intake 480 ml   Output 800 ml   Net -320 ml       General: awake/alert   Chest:  clear to auscultation bilaterally without respiratory distress  CVS: regular rate and rhythm  Abdominal: soft, nontender, positive bowel sounds  Extremities: no cyanosis or edema  Skin:  left foot ulcer and warm and dry without rash      Labs:  Results from last 7 days   Lab Units 08/09/24  0518 08/08/24  0449 08/05/24  0444   WBC 10*3/mm3 7.27 8.08 5.89   HEMOGLOBIN g/dL 9.9* 9.6* 9.5*   HEMATOCRIT % 31.6* 30.5* 30.1*   PLATELETS 10*3/mm3 247 242 210         Results from last 7 days   Lab Units 08/09/24  0518 08/08/24  0449 08/07/24  0758 08/06/24  0508 08/05/24  0444 08/04/24  0615 08/03/24  0605   SODIUM mmol/L 141 141 144   < > 144 145 143   POTASSIUM mmol/L 4.1 3.8 3.9   < > 3.9 3.9 3.8   CHLORIDE mmol/L 110* 109* 110*   < > 112* 111* 110*   CO2 mmol/L 22.0 21.0* 20.0*   < > 21.0* 21.0* 19.0*   BUN mg/dL 15 15 15   < > 20 24* 31*   CREATININE mg/dL 1.34* 1.42* 1.50*   < > 1.47* 1.60* 1.82*   CALCIUM mg/dL 8.4* 8.5* 8.6   < > 8.3* 8.5* 8.3*   EGFR mL/min/1.73 57.7* 53.8* 50.4*   < > 51.6* 46.6* 40.0*   BILIRUBIN mg/dL  --   --   --   --  0.3 0.3 0.3   ALK PHOS U/L  --   --   --   --  102 95 88   ALT (SGPT) U/L  --   --   --   --  9 9 7   AST (SGOT) U/L  --   --   --   --  16 15 12   GLUCOSE mg/dL 108* 110* 97   < > 89 97 125*    < > = values in this interval not displayed.       Radiology:   Imaging Results (Last 72 Hours)       ** No results found for the last 72 hours. **            Culture:  Blood Culture   Date Value Ref Range Status   07/31/2024 No growth at 24 hours  Preliminary   07/31/2024 Staphylococcus, coagulase negative (C)  Final         Assessment    Acute kidney injury, ATN--resolved  Baseline chronic kidney disease  stage 3b  Type 2 diabetes  Hypertension  Sepsis related to diabetic foot ulcer  Anemia of CKD  Obesity     Plan:    Renal function stable--actually a bit better than baseline today  Monitor labs  Noted ongoing work regarding discharge disposition. OK for discharge from renal standpoint when OK with others. Follow up in our office in 1-2 weeks.      Stewart Alvarez, APRN  8/9/2024  10:27 CDT

## 2024-08-09 NOTE — PLAN OF CARE
Goal Outcome Evaluation:      Patient resting well overnight. CO pain medicated. Up SBA. Safety maintained.

## 2024-08-09 NOTE — PLAN OF CARE
Goal Outcome Evaluation:  Plan of Care Reviewed With: patient        Progress: improving  Outcome Evaluation: PT tx completed. Pt sitting EOB on arrival. Requiring assist for hygiene care post incontinent episode, pt seemingly unaware of BM. Assist required for donning of sock and surgical shoe. Encouragement to attempt farther distances with ambulation. CGA and RWX for ambulation with occasional cues for RWX safety and gait mechanics. Will follow.      Anticipated Discharge Disposition (PT): skilled nursing facility

## 2024-08-09 NOTE — PLAN OF CARE
Goal Outcome Evaluation:           Progress: no change     VSS.  RA.  Up with SBA.  Wound care as ordered to left foot.  Flat affect.  Awaiting placement in SNF for ongoing wound care.  Denies pain.  Poor appetite.  Safety maintained.  Contact precautions maintained.

## 2024-08-10 LAB
ANION GAP SERPL CALCULATED.3IONS-SCNC: 10 MMOL/L (ref 5–15)
BUN SERPL-MCNC: 16 MG/DL (ref 8–23)
BUN/CREAT SERPL: 10.8 (ref 7–25)
CALCIUM SPEC-SCNC: 8.2 MG/DL (ref 8.6–10.5)
CHLORIDE SERPL-SCNC: 109 MMOL/L (ref 98–107)
CO2 SERPL-SCNC: 23 MMOL/L (ref 22–29)
CREAT SERPL-MCNC: 1.48 MG/DL (ref 0.76–1.27)
DEPRECATED RDW RBC AUTO: 47.1 FL (ref 37–54)
EGFRCR SERPLBLD CKD-EPI 2021: 51.2 ML/MIN/1.73
ERYTHROCYTE [DISTWIDTH] IN BLOOD BY AUTOMATED COUNT: 15.9 % (ref 12.3–15.4)
GLUCOSE BLDC GLUCOMTR-MCNC: 137 MG/DL (ref 70–130)
GLUCOSE BLDC GLUCOMTR-MCNC: 161 MG/DL (ref 70–130)
GLUCOSE BLDC GLUCOMTR-MCNC: 222 MG/DL (ref 70–130)
GLUCOSE BLDC GLUCOMTR-MCNC: 97 MG/DL (ref 70–130)
GLUCOSE SERPL-MCNC: 101 MG/DL (ref 65–99)
HCT VFR BLD AUTO: 32.1 % (ref 37.5–51)
HGB BLD-MCNC: 10.1 G/DL (ref 13–17.7)
MCH RBC QN AUTO: 27.3 PG (ref 26.6–33)
MCHC RBC AUTO-ENTMCNC: 31.5 G/DL (ref 31.5–35.7)
MCV RBC AUTO: 86.8 FL (ref 79–97)
PLATELET # BLD AUTO: 271 10*3/MM3 (ref 140–450)
PMV BLD AUTO: 11.8 FL (ref 6–12)
POTASSIUM SERPL-SCNC: 3.8 MMOL/L (ref 3.5–5.2)
RBC # BLD AUTO: 3.7 10*6/MM3 (ref 4.14–5.8)
SODIUM SERPL-SCNC: 142 MMOL/L (ref 136–145)
WBC NRBC COR # BLD AUTO: 7.02 10*3/MM3 (ref 3.4–10.8)

## 2024-08-10 PROCEDURE — 85027 COMPLETE CBC AUTOMATED: CPT | Performed by: FAMILY MEDICINE

## 2024-08-10 PROCEDURE — 80048 BASIC METABOLIC PNL TOTAL CA: CPT | Performed by: INTERNAL MEDICINE

## 2024-08-10 PROCEDURE — 63710000001 INSULIN GLARGINE PER 5 UNITS: Performed by: INTERNAL MEDICINE

## 2024-08-10 PROCEDURE — 63710000001 INSULIN REGULAR HUMAN PER 5 UNITS: Performed by: INTERNAL MEDICINE

## 2024-08-10 PROCEDURE — 97110 THERAPEUTIC EXERCISES: CPT

## 2024-08-10 PROCEDURE — 97116 GAIT TRAINING THERAPY: CPT

## 2024-08-10 PROCEDURE — 25010000002 ENOXAPARIN PER 10 MG: Performed by: INTERNAL MEDICINE

## 2024-08-10 PROCEDURE — 82948 REAGENT STRIP/BLOOD GLUCOSE: CPT

## 2024-08-10 RX ORDER — OXYCODONE HYDROCHLORIDE AND ACETAMINOPHEN 5; 325 MG/1; MG/1
1 TABLET ORAL EVERY 6 HOURS PRN
Status: DISCONTINUED | OUTPATIENT
Start: 2024-08-10 | End: 2024-08-12 | Stop reason: HOSPADM

## 2024-08-10 RX ADMIN — PREGABALIN 100 MG: 100 CAPSULE ORAL at 20:58

## 2024-08-10 RX ADMIN — ENOXAPARIN SODIUM 135 MG: 150 INJECTION SUBCUTANEOUS at 20:58

## 2024-08-10 RX ADMIN — FAMOTIDINE 20 MG: 20 TABLET ORAL at 09:40

## 2024-08-10 RX ADMIN — PREGABALIN 50 MG: 25 CAPSULE ORAL at 09:40

## 2024-08-10 RX ADMIN — INSULIN GLARGINE 10 UNITS: 100 INJECTION, SOLUTION SUBCUTANEOUS at 09:39

## 2024-08-10 RX ADMIN — Medication 10 ML: at 09:47

## 2024-08-10 RX ADMIN — INSULIN HUMAN 3 UNITS: 100 INJECTION, SOLUTION PARENTERAL at 17:41

## 2024-08-10 RX ADMIN — PANTOPRAZOLE SODIUM 40 MG: 40 TABLET, DELAYED RELEASE ORAL at 09:40

## 2024-08-10 RX ADMIN — OXYCODONE HYDROCHLORIDE 10 MG: 5 TABLET ORAL at 09:40

## 2024-08-10 RX ADMIN — DONEPEZIL HYDROCHLORIDE 10 MG: 10 TABLET, FILM COATED ORAL at 09:40

## 2024-08-10 RX ADMIN — SODIUM BICARBONATE 650 MG: 650 TABLET ORAL at 09:40

## 2024-08-10 RX ADMIN — INSULIN HUMAN 5 UNITS: 100 INJECTION, SOLUTION PARENTERAL at 20:57

## 2024-08-10 RX ADMIN — ENOXAPARIN SODIUM 135 MG: 150 INJECTION SUBCUTANEOUS at 09:39

## 2024-08-10 RX ADMIN — SODIUM BICARBONATE 650 MG: 650 TABLET ORAL at 20:58

## 2024-08-10 RX ADMIN — SODIUM BICARBONATE 650 MG: 650 TABLET ORAL at 17:41

## 2024-08-10 RX ADMIN — ROSUVASTATIN CALCIUM 10 MG: 10 TABLET, FILM COATED ORAL at 20:58

## 2024-08-10 RX ADMIN — OXYCODONE HYDROCHLORIDE AND ACETAMINOPHEN 1000 MG: 500 TABLET ORAL at 09:40

## 2024-08-10 RX ADMIN — OXYCODONE HYDROCHLORIDE 10 MG: 5 TABLET ORAL at 20:58

## 2024-08-10 RX ADMIN — DULOXETINE HYDROCHLORIDE 60 MG: 30 CAPSULE, DELAYED RELEASE ORAL at 09:40

## 2024-08-10 RX ADMIN — FAMOTIDINE 20 MG: 20 TABLET ORAL at 20:58

## 2024-08-10 RX ADMIN — PANTOPRAZOLE SODIUM 40 MG: 40 TABLET, DELAYED RELEASE ORAL at 20:58

## 2024-08-10 RX ADMIN — OXYCODONE HYDROCHLORIDE AND ACETAMINOPHEN 1 TABLET: 5; 325 TABLET ORAL at 03:31

## 2024-08-10 RX ADMIN — Medication 2.5 MG: at 20:58

## 2024-08-10 NOTE — THERAPY TREATMENT NOTE
Acute Care - Physical Therapy Treatment Note  Psychiatric     Patient Name: Erick Luong  : 1955  MRN: [id number]  Today's Date: 8/10/2024      Visit Dx:     ICD-10-CM ICD-9-CM   1. Diabetic foot infection  E11.628 250.80    L08.9 686.9   2. Osteomyelitis of foot, unspecified laterality, unspecified type  M86.9 730.27   3. Diabetic ketoacidosis without coma associated with type 2 diabetes mellitus  E11.10 250.12   4. Elevated troponin  R79.89 790.6   5. Atrial fibrillation with RVR  I48.91 427.31   6. Confusion  R41.0 298.9   7. Impaired mobility [Z74.09]  Z74.09 799.89     Patient Active Problem List   Diagnosis    Obesity, unspecified obesity severity, unspecified obesity type    Essential hypertension    Type 2 diabetes mellitus with hyperglycemia, with long-term current use of insulin    Nonsmoker    Anemia due to chronic kidney disease    Class 3 severe obesity due to excess calories with body mass index (BMI) of 40.0 to 44.9 in adult    Anasarca    Sleep apnea with use of continuous positive airway pressure (CPAP)    Medically noncompliant    Diabetic ulcer of left foot associated with type 2 diabetes mellitus    Diabetic polyneuropathy associated with type 2 diabetes mellitus    Spinal stenosis in cervical region    Degeneration of cervical intervertebral disc    Cervical radiculopathy    Degeneration of lumbar or lumbosacral intervertebral disc    Cervical myelopathy    Bilateral carpal tunnel syndrome    CAD (coronary artery disease)    GERD without esophagitis    BPH without obstruction/lower urinary tract symptoms    Stage 3b chronic kidney disease    Chronic diastolic heart failure    Type 2 myocardial infarction due to heart failure    Left carpal tunnel syndrome    Syncope and collapse, recurrent episodes    Poorly-controlled hypertension    Rhinovirus    Peripheral vascular disease    Chronic kidney disease (CKD), stage IV (severe)    Diabetic foot infection    Sepsis    Epigastric pain     Chronic heart failure with preserved ejection fraction (HFpEF)    Sepsis due to methicillin resistant Staphylococcus aureus (MRSA) with encephalopathy without septic shock    Slow transit constipation    CHF exacerbation    CHF (congestive heart failure), NYHA class I, acute on chronic, combined    Rectal bleeding    Acute on chronic heart failure with preserved ejection fraction (HFpEF)    DKA, type 2, not at goal    Atrial fibrillation    E. coli UTI, ESBL     Past Medical History:   Diagnosis Date    Arthritis     Autonomic disease     CAD (coronary artery disease) 02/06/2017    Cervical radiculopathy 09/16/2021    Chronic constipation with acute exaccerbation 05/10/2021    Coronary artery disease     Degeneration of cervical intervertebral disc 08/11/2021    Diabetes mellitus     Diabetic foot ulcer 08/31/2020    Diabetic polyneuropathy associated with type 2 diabetes mellitus 01/18/2021    Elevated cholesterol     Gastroesophageal reflux disease 05/13/2019    Headache     HTN (hypertension), benign 05/03/2017    Hyperlipidemia     Hypertension     Mixed hyperlipidemia 02/07/2017    Multiple lung nodules 01/26/2020    5mm, 9 mm RLL identified 1/2020, not present 10/2019.    Myocardial infarction     Osteomyelitis 01/22/2020    Osteomyelitis of fifth toe of right foot 10/07/2019    Pancreatitis     Persistent insomnia 01/20/2020    Renal disorder     Sleep apnea 02/06/2017    Sleep apnea with use of continuous positive airway pressure (CPAP)     NON-COMPLIANT    Slow transit constipation 01/16/2019    Spinal stenosis in cervical region 09/16/2021    Vitamin D deficiency 03/02/2021     Past Surgical History:   Procedure Laterality Date    ABDOMINAL SURGERY      AMPUTATION FOOT / TOE Left 10/2021    5th digit     ANTERIOR CERVICAL DISCECTOMY W/ FUSION N/A 08/05/2022    Procedure: CERVICAL DISCECTOMY ANTERIOR WITH FUSION C5-6 with possible plating of C5-7 with neuromonitoring and 1 c-arm;  Surgeon: Karel Soliz,  MD;  Location: [facility] OR;  Service: Neurosurgery;  Laterality: N/A;    APPENDECTOMY      BACK SURGERY      CARDIAC CATHETERIZATION Left 02/08/2021    Procedure: Left Heart Cath w poss intervention left anatomical snuff box acess;  Surgeon: Omkar Charles DO;  Location: [facility] CATH INVASIVE LOCATION;  Service: Cardiology;  Laterality: Left;    CARDIAC SURGERY      CATARACT EXTRACTION      CERVICAL SPINE SURGERY      COLONOSCOPY N/A 01/31/2017    Normal exam repeat in 5 years    COLONOSCOPY N/A 02/11/2019    Mild acute inflammation    COLONOSCOPY N/A 04/07/2024    2 areas at 10 and 20 cm with friability ulceration 2 clips placed at 20 cm and 4 clips at 10 cm poor prep normal mucosa,mild eroisions and ulcerations in visible vessels    COLONOSCOPY N/A 7/1/2024    Procedure: COLONOSCOPY WITH ANESTHESIA;  Surgeon: Arsalan Lorenzo DO;  Location: [facility] ENDOSCOPY;  Service: Gastroenterology;  Laterality: N/A;  pre op constipation/diarrhea  post poor prep  pcp Del Shetty    COLONOSCOPY N/A 7/2/2024    Procedure: COLONOSCOPY WITH ANESTHESIA;  Surgeon: Agapito Christopher MD;  Location: [facility] ENDOSCOPY;  Service: Gastroenterology;  Laterality: N/A;  pre rectal bleeding  post poor prep  pcp Del Shetty MD    COLONOSCOPY W/ POLYPECTOMY  03/04/2014    Hyperplastic polyp    CORONARY ARTERY BYPASS GRAFT  10/2015    ENDOSCOPY  04/13/2011    Gastritis with hemorrhage    ENDOSCOPY N/A 05/05/2017    Normal exam    ENDOSCOPY N/A 02/11/2019    Gastritis    ENDOSCOPY N/A 09/01/2020    Non-erosive gastritis with hemorrhage    ENDOSCOPY N/A 02/10/2021    Esophagitis    ENDOSCOPY N/A 04/11/2024    There were esophageal mucosal changes suspicious for short-segment Low's esophagus present in the distal esophagus. The maximum longitudinal extent of these mucosal changes was 2 cm in length. Mucosa was biopsied with a cold forceps for histologyDistal esophagus, biopsies: Mild chronic active esophagogastritis. No  evidence of intestinal metaplasia, dysplasia. Antrum, bx, Mild chronic gastritis    FOOT SURGERY Left     INCISION AND DRAINAGE OF WOUND Left 09/2007    spider bite     PT Assessment (Last 12 Hours)       PT Evaluation and Treatment       Row Name 08/10/24 1548          Physical Therapy Time and Intention    Subjective Information complains of;pain  -KJ     Document Type therapy note (daily note)  -KJ     Mode of Treatment physical therapy  -KJ       Row Name 08/10/24 1548          General Information    Existing Precautions/Restrictions fall  -KJ       Row Name 08/10/24 1548          Pain    Pretreatment Pain Rating 7/10  -KJ     Posttreatment Pain Rating 7/10  -KJ     Pain Location - Side/Orientation Left  -KJ     Pain Location - foot  -KJ       Row Name 08/10/24 1548          Bed Mobility    Supine-Sit [place] (Bed Mobility) standby assist  -KJ       Row Name 08/10/24 1548          Sit-Stand Transfer    Sit-Stand [place] (Transfers) standby assist  -KJ       Row Name 08/10/24 1548          Stand-Sit Transfer    Stand-Sit [place] (Transfers) standby assist  -KJ       Row Name 08/10/24 1548          Gait/Stairs (Locomotion)    [place] Level (Gait) contact guard  -KJ     Assistive Device (Gait) walker, front-wheeled  -KJ     Distance in Feet (Gait) 50  x 2  -KJ     Deviations/Abnormal Patterns (Gait) gait speed decreased  -KJ       Row Name 08/10/24 1548          Motor Skills    Therapeutic Exercise aerobic  -KJ       Row Name 08/10/24 1548          Aerobic Exercise    Comment, Aerobic Exercise (Therapeutic Exercise) AROM x 20 reps  -KJ       Row Name             Wound 08/22/23 0140 Left posterior plantar    Wound - Properties Group Placement Date: 08/22/23  -AM Placement Time: 0140  -AM Present on Original Admission: Y  -AM Side: Left  -AM Orientation: posterior  -AM Location: plantar  -AM    Retired Wound - Properties Group Placement Date: 08/22/23  -AM Placement Time: 0140  -AM Present on  Original Admission: Y  -AM Side: Left  -AM Orientation: posterior  -AM Location: plantar  -AM    Retired Wound - Properties Group Date first assessed: 08/22/23  -AM Time first assessed: 0140  -AM Present on Original Admission: Y  -AM Side: Left  -AM Location: plantar  -AM      Row Name             Wound Left lateral foot Diabetic Ulcer    Wound - Properties Group Side: Left  -MH Orientation: lateral  -MH Location: foot  -JACIEL, 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;diabetic foot ulcer/gangrene  Primary Wound Type: Diabetic ulc  -JACIEL Wound Outcome: Amputation  -JACIEL Stage, Pressure Injury : other (see comments)  -JACIEL, full thickness starting 10/20/21     Retired Wound - Properties Group Side: Left  -MH Orientation: lateral  -MH Location: foot  -JACIEL, 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;diabetic foot ulcer/gangrene  Primary Wound Type: Diabetic ulc  -JACIEL Stage, Pressure Injury : other (see comments)  -JACIEL, full thickness starting 10/20/21  Wound Outcome: Amputation  -JACIEL    Retired Wound - Properties Group Side: Left  -MH Location: foot  -JACIEL, 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;diabetic foot ulcer/gangrene  Primary Wound Type: Diabetic ulc  -JACIEL Wound Outcome: Amputation  -JACIEL      Row Name             Wound 06/15/23 0102 Left anterior plantar    Wound - Properties Group Placement Date: 06/15/23  -SM Placement Time: 0102 -SM Side: Left  -SM Orientation: anterior  -SM Location: plantar  -SM    Retired Wound - Properties Group Placement Date: 06/15/23  -SM Placement Time: 0102 -SM Side: Left  -SM Orientation: anterior  -SM Location: plantar  -SM    Retired Wound - Properties Group Date first assessed: 06/15/23  -SM Time first assessed: 0102 -SM Side: Left  -SM Location: plantar  -SM      Row Name 08/10/24 1548          Positioning and Restraints    Pre-Treatment Position in bed  -KJ     Post Treatment Position bed  -KJ     In Bed call light within reach  -KJ               User Key  (r) = Recorded By, (t) = Taken By, (c) = Cosigned By      Initials Name Provider Type     Claudia Maddox PTA Physical Therapist Assistant    Yuliana Lisa RN Registered Nurse    MH Haase, Mallory L, RN Registered Nurse    Faviola Kunz, RN Registered Nurse    Michelle Diaz, RN Registered Nurse                    Physical Therapy Education       Title: PT OT SLP Therapies (In Progress)       Topic: Physical Therapy (Done)       Point: Mobility training (Done)       Learning Progress Summary             Patient Acceptance, E,D, DU,VU by  at 8/4/2024 1457    Comment: benefits of PT and POC, call for A OOB                         Point: Precautions (Done)       Learning Progress Summary             Patient Acceptance, E,D, DU,VU by  at 8/4/2024 1457    Comment: benefits of PT and POC, call for A OOB                                         User Key       Initials Effective Dates Name Provider Type [facility] 02/03/23 -  Daniel Garcia, PT Physical Therapist PT                  PT Recommendation and Plan             Time Calculation:    PT Charges       Row Name 08/10/24 1606             Time Calculation    Start Time 1548  -KJ      Stop Time 1612  -KJ      Time Calculation (min) 24 min  -KJ      PT Received On 08/10/24  -KJ      PT Goal Re-Cert Due Date 08/14/24  -KJ         Time Calculation- PT    Total Timed Code Minutes- PT 24 minute(s)  -KJ                User Key  (r) = Recorded By, (t) = Taken By, (c) = Cosigned By      Initials Name Provider Type    Claudia Maddox PTA Physical Therapist Assistant                  Therapy Charges for Today       Code Description Service Date Service Provider Modifiers Qty    07976421921 HC GAIT TRAINING EA 15 MIN 8/10/2024 Claudia Prakash PTA GP 1    05702384643 HC PT THER PROC EA 15 MIN 8/10/2024 Claudia Prakash PTA GP 1            PT G-Codes  Outcome Measure Options: AM-PAC 6 Clicks Daily Activity (OT)  AM-PAC 6 Clicks Score (PT): 19  AM-PAC 6 Clicks Score (OT): 15    Claudia Prakash PTA  8/10/2024

## 2024-08-10 NOTE — PLAN OF CARE
Goal Outcome Evaluation:  Plan of Care Reviewed With: patient        Progress: no change  Outcome Evaluation: Vitals stable, accu chek w/ SSI. Walked with PT in hallway x1 walker, and off loading shoe.  Wound care completed per order to left foot. SQ Lovenox. SW following for SNF placement at d/c. Contact precautions maintained.

## 2024-08-10 NOTE — PLAN OF CARE
Goal Outcome Evaluation:      Patient resting well overnight. Up adlib. Voiding per urinal. Drsg change. Safety maintained.

## 2024-08-10 NOTE — PROGRESS NOTES
[facility] Services  INPATIENT PROGRESS NOTE    Patient Name: Erick Luong  Date of Admission: 7/31/2024  Today's Date: 08/10/24  Length of Stay: 10  Primary Care Physician: Del Shetty MD    Subjective   Chief Complaint: diabetic foot ulcer.   HPI   Complains of foot hurts when he is up on it.  Has not been ambulatory yet today.  Denies any new problems  Awaiting nursing home placement      Review of Systems   All pertinent negatives and positives are as above. All other systems have been reviewed and are negative unless otherwise stated.     Objective    Temp:  [97.8 °F (36.6 °C)-98.4 °F (36.9 °C)] 98 °F (36.7 °C)  Heart Rate:  [71-82] 71  Resp:  [16] 16  BP: (113-155)/(55-93) 134/58  Physical Exam  Vitals and nursing note reviewed.   Constitutional:       Appearance: He is obese.   HENT:      Head: Normocephalic and atraumatic.      Right Ear: External ear normal.      Left Ear: External ear normal.      Nose: Nose normal.      Mouth/Throat:      Mouth: Mucous membranes are moist.   Eyes:      Extraocular Movements: Extraocular movements intact.      Conjunctiva/sclera: Conjunctivae normal.      Pupils: Pupils are equal, round, and reactive to light.   Cardiovascular:      Rate and Rhythm: Normal rate and regular rhythm.      Pulses: Normal pulses.      Heart sounds: Normal heart sounds.   Pulmonary:      Effort: Pulmonary effort is normal.   Abdominal:      General: Bowel sounds are normal.      Palpations: Abdomen is soft.   Musculoskeletal:      Cervical back: Normal range of motion.      Comments: Moves all extremities   Skin:     General: Skin is warm and dry.      Capillary Refill: Capillary refill takes less than 2 seconds.      Comments: Dressing left foot/heel intact.   Neurological:      Mental Status: He is alert and oriented to person, place, and time.   Psychiatric:      Comments: Affect  is flat.             File Link    Scan on 8/7/2024 1702 by  Aicha Smallwood RN: Wound Left lateral foot Diabetic Ulcer        Key Information    Document ID File Type Document Type Description   [id number] Image WOUND IMAGE Wound Left lateral foot Diabetic Ulcer     Import Information    Attached At Date Time User Dept   Encounter Level 8/7/2024  5:02 PM Aicha Smallwood RN [facility]     Encounter    Hospital Encounter on 7/31/24             Results Review:  I have reviewed the labs, radiology results, and diagnostic studies.    Laboratory Data:   Results from last 7 days   Lab Units 08/10/24  0626 08/09/24  0518 08/08/24 0449   WBC 10*3/mm3 7.02 7.27 8.08   HEMOGLOBIN g/dL 10.1* 9.9* 9.6*   HEMATOCRIT % 32.1* 31.6* 30.5*   PLATELETS 10*3/mm3 271 247 242        Results from last 7 days   Lab Units 08/10/24  0626 08/09/24 0518 08/08/24 0449 08/06/24  0508 08/05/24 0444 08/04/24  0615   SODIUM mmol/L 142 141 141   < > 144 145   POTASSIUM mmol/L 3.8 4.1 3.8   < > 3.9 3.9   CHLORIDE mmol/L 109* 110* 109*   < > 112* 111*   CO2 mmol/L 23.0 22.0 21.0*   < > 21.0* 21.0*   BUN mg/dL 16 15 15   < > 20 24*   CREATININE mg/dL 1.48* 1.34* 1.42*   < > 1.47* 1.60*   CALCIUM mg/dL 8.2* 8.4* 8.5*   < > 8.3* 8.5*   BILIRUBIN mg/dL  --   --   --   --  0.3 0.3   ALK PHOS U/L  --   --   --   --  102 95   ALT (SGPT) U/L  --   --   --   --  9 9   AST (SGOT) U/L  --   --   --   --  16 15   GLUCOSE mg/dL 101* 108* 110*   < > 89 97    < > = values in this interval not displayed.       Culture Data:   Blood Culture   Date Value Ref Range Status   07/31/2024 No growth at 5 days  Final   07/31/2024 Staphylococcus, coagulase negative (C)  Final     Urine Culture   Date Value Ref Range Status   07/31/2024 >100,000 CFU/mL Escherichia coli ESBL (A)  Final     Wound Culture   Date Value Ref Range Status   08/02/2024 (A)  Final    Light growth (2+) Streptococcus agalactiae (Group B)     Comment:       This organism is considered to be universally susceptible to penicillin.  No further  antibiotic testing will be performed. If Clindamycin or Erythromycin is the drug of choice, notify the laboratory within 7 days to request susceptibility testing.   08/02/2024 (A)  Final    Light growth (2+) Streptococcus agalactiae (Group B)     Comment:       This organism is considered to be universally susceptible to penicillin.  No further antibiotic testing will be performed. If Clindamycin or Erythromycin is the drug of choice, notify the laboratory within 7 days to request susceptibility testing.       Radiology Data:   Imaging Results (Last 24 Hours)       ** No results found for the last 24 hours. **            I have reviewed the patient's current medications.     Assessment/Plan   Assessment  Active Hospital Problems    Diagnosis     **DKA, type 2, not at goal     E. coli UTI, ESBL     Atrial fibrillation     Chronic heart failure with preserved ejection fraction (HFpEF)     Chronic diastolic heart failure     GERD without esophagitis     Diabetic ulcer of left foot associated with type 2 diabetes mellitus        Treatment Plan  Continue current treatment of foot wound.  Continue current diabetic management.  Currently he is very well-controlled with medicine and diet.  Continue to await nursing home placement.  Currently waiting on pre-CERT.       Medical Decision Making  Number and Complexity of problems:   3 acute, high complexity problems  4+ chronic, moderate complexity problems     Differential Diagnosis: None     Conditions and Status        Condition is improving.     [facility] Data  External documents reviewed: Care Everywhere  Cardiac tracing (EKG, telemetry) interpretation: See HPI  Radiology interpretation: See HPI  Labs reviewed: See HPI  Any tests that were considered but not ordered: None     Decision rules/scores evaluated (example [id number], [place], etc): None     Discussed with: The patient     Care Planning  Shared decision making: Patient  Code status and discussions: Full code      Disposition  Social Determinants of Health that impact treatment or disposition: none     I expect the patient to be discharged to SNF  in 1-2 days.    Electronically signed by Alejandrina Barnett DO, 08/10/24, 11:47 CDT.

## 2024-08-10 NOTE — PROGRESS NOTES
Nephrology ([facility]) Progress Note      Patient:  Erick Luong  YOB: 1955  Date of Service: 8/10/2024  MRN: [id number]   Acct: [id number]   Primary Care Physician: Del Shetty MD  Advance Directive:   Code Status and Medical Interventions: CPR (Attempt to Resuscitate); Full Support   Ordered at: 08/01/24 0053     Level Of Support Discussed With:    Patient     Code Status (Patient has no pulse and is not breathing):    CPR (Attempt to Resuscitate)     Medical Interventions (Patient has pulse or is breathing):    Full Support     Admit Date: 7/31/2024       Hospital Day: 10  Referring Provider: Abebe Garzon DO      Patient personally seen and examined.  Complete chart including Consults, Notes, Operative Reports, Labs, Cardiology, and Radiology studies reviewed as able.        Subjective:  Erick Luong is a 69 y.o. male for whom we were consulted for evaluation and treatment of acute kidney injury. Baseline chronic kidney disease stage 3b. Baseline creatinine approximately 1.5-1.8. Follows in our office.  History of poorly controlled type 2 diabetes, hypertension, coronary artery disease, diabetic foot ulcer, obesity.  On 7/31, patient was found wandering at home confused.  He was brought to ER for evaluation. He has a nonhealing left foot diabetic ulcer with serosanguineous drainage. Blood glucose level was >700 with A1c >12%. Initial creatinine was 2.67 and has steadily improved since he was admitted. Nephrology consulted on 8/1. Hospital course remarkable for improving renal function and improved blood glucose levels. Moved to medical floor after stabilization.     Today, no overnight events.  Hopeful for discharge soon.  Denied current chest pain, shortness of air at rest, nausea or vomiting.  Up in bed without any complaint.       Allergies:  Cefepime, Bactrim [sulfamethoxazole-trimethoprim], Vancomycin, Zolpidem, and Metronidazole    Home  Meds:  Medications Prior to Admission   Medication Sig Dispense Refill Last Dose    ascorbic acid (VITAMIN C) 1000 MG tablet Take 1 tablet by mouth Daily. 30 tablet 3     bumetanide (BUMEX) 1 MG tablet Take 1 tablet by mouth 2 (Two) Times a Day for 30 days. 60 tablet 0     busPIRone (BUSPAR) 10 MG tablet Take 1 tablet by mouth 2 (Two) Times a Day.       calcitriol (ROCALTROL) 0.5 MCG capsule Take 1 capsule by mouth Daily. 90 capsule 4     carvedilol (COREG) 3.125 MG tablet Take 1 tablet twice a day by oral route. 60 tablet 4     donepezil (ARICEPT) 10 MG tablet Take 1 tablet by mouth Daily. 90 tablet 1     DULoxetine (CYMBALTA) 60 MG capsule Take 1 capsule by mouth Daily. 90 capsule 4     famotidine (PEPCID) 20 MG tablet Take 1 tablet twice a day by oral route. 180 tablet 4     Insulin Glargine (Lantus SoloStar) 100 UNIT/ML injection pen Inject 20 Units under the skin into the appropriate area as directed every night at bedtime. 15 mL 3     Insulin Regular Human, Conc, (HumuLIN R) 500 UNIT/ML solution pen-injector CONCENTRATED injection Inject 40 Units under the skin into the appropriate area as directed 2 (Two) Times a Day Before Meals. Inject 40 units under the skin in the the appropriate area with regular meals AND 40 units with large meals.       Iron-Vitamin C (Vitron-C)  MG tablet Take 1 tablet twice a day by oral route. 60 tablet 2     levocetirizine (XYZAL) 5 MG tablet Take 1 tablet by mouth every day at bedtime. 30 tablet 2     melatonin 3 MG tablet Take 2 tablets by mouth At Night As Needed for Sleep. 30 tablet 0     oxyCODONE (ROXICODONE) 10 MG tablet Take 1 tablet by mouth 2 (Two) Times a Day As Needed. Must last 30 days per md. 55 tablet 0     pantoprazole (PROTONIX) 40 MG EC tablet Take 1 tablet by mouth Every 12 (Twelve) Hours. 180 tablet 4     pregabalin (LYRICA) 100 MG capsule Take 1 capsule by mouth Daily.       pregabalin (LYRICA) 100 MG capsule Take 2 capsules by mouth every night at  bedtime.       rosuvastatin (CRESTOR) 10 MG tablet Take 1 tablet by mouth Every Night. 30 tablet 2     sucralfate (CARAFATE) 1 g tablet Take 1 tablet by mouth 4 (Four) Times a Day before meals. 360 tablet 1     Diclofenac Sodium (VOLTAREN) 1 % gel gel Apply 2 g topically to the appropriate area as directed 4 (Four) Times a Day As Needed. 300 g 11     nitroglycerin (NITROSTAT) 0.4 MG SL tablet Place 1 tablet under the tongue Every 5 (Five) Minutes As Needed for Chest Pain. Take no more than 3 doses in 15 minutes.       polyethylene glycol (MIRALAX) 17 GM/SCOOP powder Take 17 g by mouth Daily As Needed (constipation).       sennosides-docusate (PERICOLACE) 8.6-50 MG per tablet Take 1 tablet by mouth Every Night. Obtain OTC          Medicines:  Current Facility-Administered Medications   Medication Dose Route Frequency Provider Last Rate Last Admin    ascorbic acid (VITAMIN C) tablet 1,000 mg  1,000 mg Oral Daily Reuben Garza APRN   1,000 mg at 08/10/24 0940    sennosides-docusate (PERICOLACE) 8.6-50 MG per tablet 2 tablet  2 tablet Oral BID Lamine Figueroa APRN   2 tablet at 08/09/24 0903    And    polyethylene glycol (MIRALAX) packet 17 g  17 g Oral Daily PRN Lamine Figueroa APRN        And    bisacodyl (DULCOLAX) EC tablet 5 mg  5 mg Oral Daily PRN Lamine Figueroa APRN        And    bisacodyl (DULCOLAX) suppository 10 mg  10 mg Rectal Daily PRN Lamine Figueroa APRN   10 mg at 08/02/24 0941    Calcium Replacement - Follow Nurse / BPA Driven Protocol   Does not apply PRN Abebe Garzon DO        dextrose (D50W) (25 g/50 mL) IV injection 10-50 mL  10-50 mL Intravenous Q15 Min PRN Abebe Garzon DO        dextrose (D50W) (25 g/50 mL) IV injection 25 g  25 g Intravenous Q15 Min PRN Lamine Figueroa APRN        dextrose (GLUTOSE) oral gel 15 g  15 g Oral Q15 Min PRN Lamine Figueroa APRN        donepezil (ARICEPT) tablet 10 mg  10 mg Oral Daily Reuben Garza APRN   10 mg at 08/10/24 0924     DULoxetine (CYMBALTA) DR capsule 60 mg  60 mg Oral Daily Reuben Garza APRN   60 mg at 08/10/24 0940    Enoxaparin Sodium (LOVENOX) syringe 135 mg  1 mg/kg Subcutaneous Q12H Abebe Garzon DO   135 mg at 08/10/24 0939    famotidine (PEPCID) tablet 20 mg  20 mg Oral Q12H Reuben Garza APRN   20 mg at 08/10/24 0940    glucagon (GLUCAGEN) injection 1 mg  1 mg Intramuscular Q15 Min PRN Lamine Figueroa APRN        insulin glargine (LANTUS, SEMGLEE) injection 10 Units  10 Units Subcutaneous Daily Keren Jamison MD   10 Units at 08/10/24 0939    insulin regular (humuLIN R,novoLIN R) injection 3-14 Units  3-14 Units Subcutaneous 4x Daily AC & at Bedtime Keren Jamison MD   3 Units at 08/10/24 1741    Magnesium Standard Dose Replacement - Follow Nurse / BPA Driven Protocol   Does not apply PRN Abebe Garzon DO        melatonin tablet 2.5 mg  2.5 mg Oral Nightly Reuben Garza APRN   2.5 mg at 08/09/24 2118    nitroglycerin (NITROSTAT) SL tablet 0.4 mg  0.4 mg Sublingual Q5 Min PRN Lamine Figueroa APRN        ondansetron (ZOFRAN) injection 4 mg  4 mg Intravenous Q6H PRN Payam Keyes DO   4 mg at 08/08/24 0900    oxyCODONE (ROXICODONE) immediate release tablet 10 mg  10 mg Oral BID Lamine Figueroa APRN   10 mg at 08/10/24 0940    oxyCODONE-acetaminophen (PERCOCET) 5-325 MG per tablet 1 tablet  1 tablet Oral Q6H PRN Garrett Alicia MD   1 tablet at 08/10/24 0331    pantoprazole (PROTONIX) EC tablet 40 mg  40 mg Oral Q12H Reuben Garza APRN   40 mg at 08/10/24 0940    Pharmacy to Dose enoxaparin (LOVENOX)   Does not apply Continuous PRN Abebe Garzon DO        Phosphorus Replacement - Follow Nurse / BPA Driven Protocol   Does not apply PRN Abebe Garzon,         polyethylene glycol (MIRALAX) packet 17 g  17 g Oral Daily Reuben Garza APRN   17 g at 08/09/24 0904    Potassium Replacement - Follow Nurse / BPA Driven Protocol   Does not apply PRN Abebe Garzon,  DO        pregabalin (LYRICA) capsule 100 mg  100 mg Oral Nightly Reuben Garza APRN   100 mg at 08/09/24 2117    pregabalin (LYRICA) capsule 50 mg  50 mg Oral Daily Reuben Garza APRN   50 mg at 08/10/24 0940    rosuvastatin (CRESTOR) tablet 10 mg  10 mg Oral Nightly Reuben Garza APRN   10 mg at 08/09/24 2117    sodium bicarbonate tablet 650 mg  650 mg Oral TID Brian Lomas MD   650 mg at 08/10/24 1741    sodium chloride 0.9 % flush 10 mL  10 mL Intravenous Q12H Abebe Garzon,    10 mL at 08/08/24 2023    sodium chloride 0.9 % flush 10 mL  10 mL Intravenous PRN Abebe Garzon,         sodium chloride 0.9 % flush 10 mL  10 mL Intravenous Q12H Lamine Figueroa APRN   10 mL at 08/10/24 0947    sodium chloride 0.9 % flush 10 mL  10 mL Intravenous PRN Lamine Figueroa APRN        sodium chloride 0.9 % infusion 40 mL  40 mL Intravenous PRN Abebe Garzon,         sodium chloride 0.9 % infusion 40 mL  40 mL Intravenous PRN Lamine Figueroa APRN           Past Medical History:  Past Medical History:   Diagnosis Date    Arthritis     Autonomic disease     CAD (coronary artery disease) 02/06/2017    Cervical radiculopathy 09/16/2021    Chronic constipation with acute exaccerbation 05/10/2021    Coronary artery disease     Degeneration of cervical intervertebral disc 08/11/2021    Diabetes mellitus     Diabetic foot ulcer 08/31/2020    Diabetic polyneuropathy associated with type 2 diabetes mellitus 01/18/2021    Elevated cholesterol     Gastroesophageal reflux disease 05/13/2019    Headache     HTN (hypertension), benign 05/03/2017    Hyperlipidemia     Hypertension     Mixed hyperlipidemia 02/07/2017    Multiple lung nodules 01/26/2020    5mm, 9 mm RLL identified 1/2020, not present 10/2019.    Myocardial infarction     Osteomyelitis 01/22/2020    Osteomyelitis of fifth toe of right foot 10/07/2019    Pancreatitis     Persistent insomnia 01/20/2020    Renal disorder     Sleep apnea  02/06/2017    Sleep apnea with use of continuous positive airway pressure (CPAP)     NON-COMPLIANT    Slow transit constipation 01/16/2019    Spinal stenosis in cervical region 09/16/2021    Vitamin D deficiency 03/02/2021       Past Surgical History:  Past Surgical History:   Procedure Laterality Date    ABDOMINAL SURGERY      AMPUTATION FOOT / TOE Left 10/2021    5th digit     ANTERIOR CERVICAL DISCECTOMY W/ FUSION N/A 08/05/2022    Procedure: CERVICAL DISCECTOMY ANTERIOR WITH FUSION C5-6 with possible plating of C5-7 with neuromonitoring and 1 c-arm;  Surgeon: Karel Soliz MD;  Location: [facility] OR;  Service: Neurosurgery;  Laterality: N/A;    APPENDECTOMY      BACK SURGERY      CARDIAC CATHETERIZATION Left 02/08/2021    Procedure: Left Heart Cath w poss intervention left anatomical snuff box acess;  Surgeon: Omkar Charles DO;  Location: [facility] CATH INVASIVE LOCATION;  Service: Cardiology;  Laterality: Left;    CARDIAC SURGERY      CATARACT EXTRACTION      CERVICAL SPINE SURGERY      COLONOSCOPY N/A 01/31/2017    Normal exam repeat in 5 years    COLONOSCOPY N/A 02/11/2019    Mild acute inflammation    COLONOSCOPY N/A 04/07/2024    2 areas at 10 and 20 cm with friability ulceration 2 clips placed at 20 cm and 4 clips at 10 cm poor prep normal mucosa,mild eroisions and ulcerations in visible vessels    COLONOSCOPY N/A 7/1/2024    Procedure: COLONOSCOPY WITH ANESTHESIA;  Surgeon: Arsalan Lorenzo DO;  Location: [facility] ENDOSCOPY;  Service: Gastroenterology;  Laterality: N/A;  pre op constipation/diarrhea  post poor prep  pcp Del Shetty    COLONOSCOPY N/A 7/2/2024    Procedure: COLONOSCOPY WITH ANESTHESIA;  Surgeon: Agapito Christopher MD;  Location: [facility] ENDOSCOPY;  Service: Gastroenterology;  Laterality: N/A;  pre rectal bleeding  post poor prep  pcp Del Shetty MD    COLONOSCOPY W/ POLYPECTOMY  03/04/2014    Hyperplastic polyp    CORONARY ARTERY BYPASS GRAFT  10/2015    ENDOSCOPY   2011    Gastritis with hemorrhage    ENDOSCOPY N/A 2017    Normal exam    ENDOSCOPY N/A 2019    Gastritis    ENDOSCOPY N/A 2020    Non-erosive gastritis with hemorrhage    ENDOSCOPY N/A 02/10/2021    Esophagitis    ENDOSCOPY N/A 2024    There were esophageal mucosal changes suspicious for short-segment Low's esophagus present in the distal esophagus. The maximum longitudinal extent of these mucosal changes was 2 cm in length. Mucosa was biopsied with a cold forceps for histologyDistal esophagus, biopsies: Mild chronic active esophagogastritis. No evidence of intestinal metaplasia, dysplasia. Antrum, bx, Mild chronic gastritis    FOOT SURGERY Left     INCISION AND DRAINAGE OF WOUND Left 2007    spider bite       Family History  Family History   Problem Relation Age of Onset    Colon cancer Father     Heart disease Father     Colon cancer Sister     Colon polyps Sister     Alzheimer's disease Mother     Coronary artery disease Sister     Coronary artery disease Sister        Social History  Social History     Socioeconomic History    Marital status:    Tobacco Use    Smoking status: Former     Current packs/day: 0.00     Types: Cigarettes     Quit date:      Years since quittin.6    Smokeless tobacco: Never    Tobacco comments:     smoked in [place] [employer]   Vaping Use    Vaping status: Never Used   Substance and Sexual Activity    Alcohol use: No    Drug use: No    Sexual activity: Defer       Review of Systems:  History obtained from chart review and the patient  General ROS: No fever or chills  Respiratory ROS: No cough, shortness of breath, wheezing  Cardiovascular ROS: No chest pain or palpitations  Gastrointestinal ROS: No abdominal pain or melena  Genito-Urinary ROS: No dysuria or hematuria  Psych ROS: No anxiety and depression  14 point ROS reviewed with the patient and negative except as noted above and in the HPI unless unable to obtain.    Objective:  Patient  Vitals for the past 24 hrs:   BP Temp Temp src Pulse Resp SpO2 Weight   08/10/24 1601 164/78 97.3 °F (36.3 °C) Oral 90 16 97 % --   08/10/24 1052 135/65 98 °F (36.7 °C) Oral 71 16 94 % --   08/10/24 0751 134/58 97.9 °F (36.6 °C) Oral 72 16 94 % --   08/10/24 0429 113/55 98.4 °F (36.9 °C) Oral 71 16 98 % (!) 136 kg (300 lb 14.4 oz)   08/10/24 0106 142/78 97.9 °F (36.6 °C) Oral 79 16 94 % --   08/09/24 1948 155/93 98.1 °F (36.7 °C) Oral 82 16 94 % --       Intake/Output Summary (Last 24 hours) at 8/10/2024 1750  Last data filed at 8/10/2024 1601  Gross per 24 hour   Intake 480 ml   Output 575 ml   Net -95 ml     General: awake/alert   Chest:  clear to auscultation bilaterally without respiratory distress  CVS: regular rate and rhythm  Abdominal: soft, nontender, positive bowel sounds, obese  Extremities: tr ble edema  Skin: warm and dry without rash with wounds as documented      Labs:  Results from last 7 days   Lab Units 08/10/24  0626 08/09/24 0518 08/08/24 0449   WBC 10*3/mm3 7.02 7.27 8.08   HEMOGLOBIN g/dL 10.1* 9.9* 9.6*   HEMATOCRIT % 32.1* 31.6* 30.5*   PLATELETS 10*3/mm3 271 247 242         Results from last 7 days   Lab Units 08/10/24  0626 08/09/24  0518 08/08/24  0449 08/06/24  0508 08/05/24  0444 08/04/24  0615   SODIUM mmol/L 142 141 141   < > 144 145   POTASSIUM mmol/L 3.8 4.1 3.8   < > 3.9 3.9   CHLORIDE mmol/L 109* 110* 109*   < > 112* 111*   CO2 mmol/L 23.0 22.0 21.0*   < > 21.0* 21.0*   BUN mg/dL 16 15 15   < > 20 24*   CREATININE mg/dL 1.48* 1.34* 1.42*   < > 1.47* 1.60*   CALCIUM mg/dL 8.2* 8.4* 8.5*   < > 8.3* 8.5*   EGFR mL/min/1.73 51.2* 57.7* 53.8*   < > 51.6* 46.6*   BILIRUBIN mg/dL  --   --   --   --  0.3 0.3   ALK PHOS U/L  --   --   --   --  102 95   ALT (SGPT) U/L  --   --   --   --  9 9   AST (SGOT) U/L  --   --   --   --  16 15   GLUCOSE mg/dL 101* 108* 110*   < > 89 97    < > = values in this interval not displayed.       Radiology:   Imaging Results (Last 72 Hours)       ** No  "results found for the last 72 hours. **            Culture:  No results found for: \"BLOODCX\", \"URINECX\", \"WOUNDCX\", \"MRSACX\", \"RESPCX\", \"STOOLCX\"      Assessment   Acute kidney injury/ATN  Chronic kidney disease stage IIIb  Hypertension  Diabetes type 2  Diabetic foot ulcer with sepsis  Anemia chronic kidney disease  Obesity  Metabolic acidosis     Plan:  Discussed with patient, nursing  Workup reviewed today  Monitor labs  Weaned IV fluids  ID / podiatry evaluation reviewed as current  Antibiotics per ID-finished ertapenem on 8/7        Willy Arteaga MD  8/10/2024  17:50 CDT      "

## 2024-08-11 LAB
ANION GAP SERPL CALCULATED.3IONS-SCNC: 8 MMOL/L (ref 5–15)
BUN SERPL-MCNC: 17 MG/DL (ref 8–23)
BUN/CREAT SERPL: 11.9 (ref 7–25)
CALCIUM SPEC-SCNC: 8.3 MG/DL (ref 8.6–10.5)
CHLORIDE SERPL-SCNC: 110 MMOL/L (ref 98–107)
CO2 SERPL-SCNC: 22 MMOL/L (ref 22–29)
CREAT SERPL-MCNC: 1.43 MG/DL (ref 0.76–1.27)
DEPRECATED RDW RBC AUTO: 46.9 FL (ref 37–54)
EGFRCR SERPLBLD CKD-EPI 2021: 53.4 ML/MIN/1.73
ERYTHROCYTE [DISTWIDTH] IN BLOOD BY AUTOMATED COUNT: 16.3 % (ref 12.3–15.4)
GLUCOSE BLDC GLUCOMTR-MCNC: 120 MG/DL (ref 70–130)
GLUCOSE BLDC GLUCOMTR-MCNC: 157 MG/DL (ref 70–130)
GLUCOSE BLDC GLUCOMTR-MCNC: 160 MG/DL (ref 70–130)
GLUCOSE BLDC GLUCOMTR-MCNC: 163 MG/DL (ref 70–130)
GLUCOSE SERPL-MCNC: 133 MG/DL (ref 65–99)
HCT VFR BLD AUTO: 31.3 % (ref 37.5–51)
HGB BLD-MCNC: 10.1 G/DL (ref 13–17.7)
MCH RBC QN AUTO: 27.7 PG (ref 26.6–33)
MCHC RBC AUTO-ENTMCNC: 32.3 G/DL (ref 31.5–35.7)
MCV RBC AUTO: 85.8 FL (ref 79–97)
PLATELET # BLD AUTO: 248 10*3/MM3 (ref 140–450)
PMV BLD AUTO: 11.4 FL (ref 6–12)
POTASSIUM SERPL-SCNC: 4 MMOL/L (ref 3.5–5.2)
RBC # BLD AUTO: 3.65 10*6/MM3 (ref 4.14–5.8)
SODIUM SERPL-SCNC: 140 MMOL/L (ref 136–145)
WBC NRBC COR # BLD AUTO: 6.6 10*3/MM3 (ref 3.4–10.8)

## 2024-08-11 PROCEDURE — 80048 BASIC METABOLIC PNL TOTAL CA: CPT | Performed by: INTERNAL MEDICINE

## 2024-08-11 PROCEDURE — 25010000002 ENOXAPARIN PER 10 MG: Performed by: INTERNAL MEDICINE

## 2024-08-11 PROCEDURE — 99231 SBSQ HOSP IP/OBS SF/LOW 25: CPT | Performed by: INTERNAL MEDICINE

## 2024-08-11 PROCEDURE — 94799 UNLISTED PULMONARY SVC/PX: CPT

## 2024-08-11 PROCEDURE — 85027 COMPLETE CBC AUTOMATED: CPT | Performed by: FAMILY MEDICINE

## 2024-08-11 PROCEDURE — 25010000002 ONDANSETRON PER 1 MG: Performed by: FAMILY MEDICINE

## 2024-08-11 PROCEDURE — 94761 N-INVAS EAR/PLS OXIMETRY MLT: CPT

## 2024-08-11 PROCEDURE — 82948 REAGENT STRIP/BLOOD GLUCOSE: CPT

## 2024-08-11 PROCEDURE — 94640 AIRWAY INHALATION TREATMENT: CPT

## 2024-08-11 PROCEDURE — 63710000001 INSULIN GLARGINE PER 5 UNITS: Performed by: INTERNAL MEDICINE

## 2024-08-11 PROCEDURE — 63710000001 INSULIN REGULAR HUMAN PER 5 UNITS: Performed by: INTERNAL MEDICINE

## 2024-08-11 RX ORDER — IPRATROPIUM BROMIDE AND ALBUTEROL SULFATE 2.5; .5 MG/3ML; MG/3ML
3 SOLUTION RESPIRATORY (INHALATION) EVERY 4 HOURS PRN
Status: DISCONTINUED | OUTPATIENT
Start: 2024-08-11 | End: 2024-08-12 | Stop reason: HOSPADM

## 2024-08-11 RX ADMIN — OXYCODONE HYDROCHLORIDE AND ACETAMINOPHEN 1 TABLET: 5; 325 TABLET ORAL at 01:08

## 2024-08-11 RX ADMIN — Medication 10 ML: at 21:30

## 2024-08-11 RX ADMIN — INSULIN HUMAN 3 UNITS: 100 INJECTION, SOLUTION PARENTERAL at 17:30

## 2024-08-11 RX ADMIN — PREGABALIN 100 MG: 100 CAPSULE ORAL at 21:30

## 2024-08-11 RX ADMIN — INSULIN HUMAN 3 UNITS: 100 INJECTION, SOLUTION PARENTERAL at 11:36

## 2024-08-11 RX ADMIN — FAMOTIDINE 20 MG: 20 TABLET ORAL at 08:29

## 2024-08-11 RX ADMIN — SODIUM BICARBONATE 650 MG: 650 TABLET ORAL at 08:28

## 2024-08-11 RX ADMIN — ENOXAPARIN SODIUM 135 MG: 150 INJECTION SUBCUTANEOUS at 08:31

## 2024-08-11 RX ADMIN — OXYCODONE HYDROCHLORIDE AND ACETAMINOPHEN 1 TABLET: 5; 325 TABLET ORAL at 20:36

## 2024-08-11 RX ADMIN — ONDANSETRON 4 MG: 2 INJECTION INTRAMUSCULAR; INTRAVENOUS at 20:36

## 2024-08-11 RX ADMIN — PANTOPRAZOLE SODIUM 40 MG: 40 TABLET, DELAYED RELEASE ORAL at 21:29

## 2024-08-11 RX ADMIN — Medication 10 ML: at 08:31

## 2024-08-11 RX ADMIN — OXYCODONE HYDROCHLORIDE 10 MG: 5 TABLET ORAL at 08:29

## 2024-08-11 RX ADMIN — PREGABALIN 50 MG: 25 CAPSULE ORAL at 08:28

## 2024-08-11 RX ADMIN — PANTOPRAZOLE SODIUM 40 MG: 40 TABLET, DELAYED RELEASE ORAL at 08:29

## 2024-08-11 RX ADMIN — SODIUM BICARBONATE 650 MG: 650 TABLET ORAL at 21:29

## 2024-08-11 RX ADMIN — DOCUSATE SODIUM 50 MG AND SENNOSIDES 8.6 MG 2 TABLET: 8.6; 5 TABLET, FILM COATED ORAL at 21:29

## 2024-08-11 RX ADMIN — INSULIN HUMAN 3 UNITS: 100 INJECTION, SOLUTION PARENTERAL at 21:30

## 2024-08-11 RX ADMIN — OXYCODONE HYDROCHLORIDE AND ACETAMINOPHEN 1000 MG: 500 TABLET ORAL at 08:28

## 2024-08-11 RX ADMIN — ENOXAPARIN SODIUM 135 MG: 150 INJECTION SUBCUTANEOUS at 21:30

## 2024-08-11 RX ADMIN — Medication 2.5 MG: at 21:29

## 2024-08-11 RX ADMIN — IPRATROPIUM BROMIDE AND ALBUTEROL SULFATE 3 ML: .5; 3 SOLUTION RESPIRATORY (INHALATION) at 19:06

## 2024-08-11 RX ADMIN — SODIUM BICARBONATE 650 MG: 650 TABLET ORAL at 17:30

## 2024-08-11 RX ADMIN — DONEPEZIL HYDROCHLORIDE 10 MG: 10 TABLET, FILM COATED ORAL at 08:29

## 2024-08-11 RX ADMIN — INSULIN GLARGINE 10 UNITS: 100 INJECTION, SOLUTION SUBCUTANEOUS at 08:27

## 2024-08-11 RX ADMIN — FAMOTIDINE 20 MG: 20 TABLET ORAL at 21:29

## 2024-08-11 RX ADMIN — Medication 10 ML: at 20:36

## 2024-08-11 RX ADMIN — DULOXETINE HYDROCHLORIDE 60 MG: 30 CAPSULE, DELAYED RELEASE ORAL at 08:28

## 2024-08-11 RX ADMIN — ROSUVASTATIN CALCIUM 10 MG: 10 TABLET, FILM COATED ORAL at 21:29

## 2024-08-11 NOTE — PROGRESS NOTES
"    [facility] Medicine Services  INPATIENT PROGRESS NOTE    Patient Name: Erick Luong  Date of Admission: 7/31/2024  Today's Date: 08/11/24  Length of Stay: 11  Primary Care Physician: Del Shetty MD    Subjective   Chief Complaint: diabetic foot ulcer.   HPI     Patient relates he did walk yesterday.  Did \"ok\"  No new problems.  Awaiting nursing home placement      Review of Systems   All pertinent negatives and positives are as above. All other systems have been reviewed and are negative unless otherwise stated.     Objective    Temp:  [97.3 °F (36.3 °C)-98.2 °F (36.8 °C)] 98.2 °F (36.8 °C)  Heart Rate:  [66-90] 66  Resp:  [16] 16  BP: (135-164)/(58-81) 146/61  Physical Exam  Vitals and nursing note reviewed.   Constitutional:       Appearance: He is obese.   HENT:      Head: Normocephalic and atraumatic.      Right Ear: External ear normal.      Left Ear: External ear normal.      Nose: Nose normal.      Mouth/Throat:      Mouth: Mucous membranes are moist.   Eyes:      Extraocular Movements: Extraocular movements intact.      Conjunctiva/sclera: Conjunctivae normal.      Pupils: Pupils are equal, round, and reactive to light.   Cardiovascular:      Rate and Rhythm: Normal rate and regular rhythm.      Pulses: Normal pulses.      Heart sounds: Normal heart sounds.   Pulmonary:      Effort: Pulmonary effort is normal.   Abdominal:      General: Bowel sounds are normal.      Palpations: Abdomen is soft.   Musculoskeletal:      Cervical back: Normal range of motion.      Comments: Moves all extremities   Skin:     General: Skin is warm and dry.      Capillary Refill: Capillary refill takes less than 2 seconds.      Comments: Dressing left foot/heel intact.   Neurological:      Mental Status: He is alert and oriented to person, place, and time.   Psychiatric:      Comments: Affect  is flat.             File Link    Scan on 8/7/2024 1702 by Aicha Smallwood RN: Wound Left " lateral foot Diabetic Ulcer        Key Information    Document ID File Type Document Type Description   [id number] Image WOUND IMAGE Wound Left lateral foot Diabetic Ulcer     Import Information    Attached At Date Time User Dept   Encounter Level 8/7/2024  5:02 PM Aicha Smallwood, RN [facility]     Encounter    Hospital Encounter on 7/31/24             Results Review:  I have reviewed the labs, radiology results, and diagnostic studies.    Laboratory Data:   Results from last 7 days   Lab Units 08/11/24  0537 08/10/24  0626 08/09/24  0518   WBC 10*3/mm3 6.60 7.02 7.27   HEMOGLOBIN g/dL 10.1* 10.1* 9.9*   HEMATOCRIT % 31.3* 32.1* 31.6*   PLATELETS 10*3/mm3 248 271 247        Results from last 7 days   Lab Units 08/11/24  0537 08/10/24  0626 08/09/24  0518 08/06/24  0508 08/05/24  0444   SODIUM mmol/L 140 142 141   < > 144   POTASSIUM mmol/L 4.0 3.8 4.1   < > 3.9   CHLORIDE mmol/L 110* 109* 110*   < > 112*   CO2 mmol/L 22.0 23.0 22.0   < > 21.0*   BUN mg/dL 17 16 15   < > 20   CREATININE mg/dL 1.43* 1.48* 1.34*   < > 1.47*   CALCIUM mg/dL 8.3* 8.2* 8.4*   < > 8.3*   BILIRUBIN mg/dL  --   --   --   --  0.3   ALK PHOS U/L  --   --   --   --  102   ALT (SGPT) U/L  --   --   --   --  9   AST (SGOT) U/L  --   --   --   --  16   GLUCOSE mg/dL 133* 101* 108*   < > 89    < > = values in this interval not displayed.       Culture Data:   Blood Culture   Date Value Ref Range Status   07/31/2024 No growth at 5 days  Final   07/31/2024 Staphylococcus, coagulase negative (C)  Final     Urine Culture   Date Value Ref Range Status   07/31/2024 >100,000 CFU/mL Escherichia coli ESBL (A)  Final     Wound Culture   Date Value Ref Range Status   08/02/2024 (A)  Final    Light growth (2+) Streptococcus agalactiae (Group B)     Comment:       This organism is considered to be universally susceptible to penicillin.  No further antibiotic testing will be performed. If Clindamycin or Erythromycin is the drug of choice, notify the  laboratory within 7 days to request susceptibility testing.   08/02/2024 (A)  Final    Light growth (2+) Streptococcus agalactiae (Group B)     Comment:       This organism is considered to be universally susceptible to penicillin.  No further antibiotic testing will be performed. If Clindamycin or Erythromycin is the drug of choice, notify the laboratory within 7 days to request susceptibility testing.       Radiology Data:   Imaging Results (Last 24 Hours)       ** No results found for the last 24 hours. **            I have reviewed the patient's current medications.     Assessment/Plan   Assessment  Active Hospital Problems    Diagnosis     **DKA, type 2, not at goal     E. coli UTI, ESBL     Atrial fibrillation     Chronic heart failure with preserved ejection fraction (HFpEF)     Chronic diastolic heart failure     GERD without esophagitis     Diabetic ulcer of left foot associated with type 2 diabetes mellitus        Treatment Plan  Continue current treatment of foot wound.  Continue current diabetic management.  Continue to await nursing home placement.  Currently waiting on pre-CERT.       Medical Decision Making  Number and Complexity of problems:   3 acute, high complexity problems  4+ chronic, moderate complexity problems     Differential Diagnosis: None     Conditions and Status        Condition is improving.     [facility] Data  External documents reviewed: Care Everywhere  Cardiac tracing (EKG, telemetry) interpretation: See HPI  Radiology interpretation: See HPI  Labs reviewed: See HPI  Any tests that were considered but not ordered: None     Decision rules/scores evaluated (example [id number], Wells, etc): None     Discussed with: The patient     Care Planning  Shared decision making: Patient  Code status and discussions: Full code     Disposition  Social Determinants of Health that impact treatment or disposition: none     I expect the patient to be discharged to SNF  in 1 days.    Electronically signed  by Alejandrina Barnett DO, 08/11/24, 10:58 CDT.

## 2024-08-11 NOTE — PLAN OF CARE
Goal Outcome Evaluation:  Plan of Care Reviewed With: patient        Progress: no change  Outcome Evaluation: Vitals stable, accu chek w/ SSI. off loading shoe when up out of bed.  Wound care completed per order to left foot, new pictures taken and uploaded into chart per MD request. SQ Lovenox. SW following for SNF placement at d/c. Contact precautions maintained.

## 2024-08-11 NOTE — PROGRESS NOTES
"INFECTIOUS DISEASES PROGRESS NOTE    Patient:  Erick Luong  YOB: 1955  MRN: [id number]   Admit date: 7/31/2024   Admitting Physician: Alejandrina Barnett DO  Primary Care Physician: Del Shetty MD    Chief Complaint: Left foot wound      Interval History: Patient indicates that he has been up to the chair already today.  When asked about foot pain he said really only his heel hurts.        Allergies:   Allergies   Allergen Reactions    Cefepime Hives and Anaphylaxis    Bactrim [Sulfamethoxazole-Trimethoprim] Other (See Comments)     \"RENAL FAILURE\"    Vancomycin Itching    Zolpidem Mental Status Change     \"makes him crazy\"    Metronidazole Rash       Current Scheduled Medications:   ascorbic acid, 1,000 mg, Oral, Daily  donepezil, 10 mg, Oral, Daily  DULoxetine, 60 mg, Oral, Daily  enoxaparin, 1 mg/kg, Subcutaneous, Q12H  famotidine, 20 mg, Oral, Q12H  insulin glargine, 10 Units, Subcutaneous, Daily  insulin regular, 3-14 Units, Subcutaneous, 4x Daily AC & at Bedtime  melatonin, 2.5 mg, Oral, Nightly  pantoprazole, 40 mg, Oral, Q12H  polyethylene glycol, 17 g, Oral, Daily  pregabalin, 100 mg, Oral, Nightly  pregabalin, 50 mg, Oral, Daily  rosuvastatin, 10 mg, Oral, Nightly  senna-docusate sodium, 2 tablet, Oral, BID  sodium bicarbonate, 650 mg, Oral, TID  sodium chloride, 10 mL, Intravenous, Q12H  sodium chloride, 10 mL, Intravenous, Q12H      Current PRN Medications:    senna-docusate sodium **AND** polyethylene glycol **AND** bisacodyl **AND** bisacodyl    Calcium Replacement - Follow Nurse / BPA Driven Protocol    dextrose    dextrose    dextrose    glucagon (human recombinant)    Magnesium Standard Dose Replacement - Follow Nurse / BPA Driven Protocol    nitroglycerin    ondansetron    oxyCODONE-acetaminophen    Pharmacy to Dose enoxaparin (LOVENOX)    Phosphorus Replacement - Follow Nurse / BPA Driven Protocol    Potassium Replacement - Follow Nurse / BPA Driven Protocol    sodium " "chloride    sodium chloride    sodium chloride    sodium chloride    Pharmacy to Dose enoxaparin (LOVENOX),            Objective     Vital Signs:  Temp (24hrs), Av.8 °F (36.6 °C), Min:97.3 °F (36.3 °C), Max:98.2 °F (36.8 °C)      /88 (BP Location: Right arm, Patient Position: Sitting)   Pulse 83   Temp 97.7 °F (36.5 °C) (Oral)   Resp 16   Ht 182.9 cm (72\")   Wt (!) 142 kg (312 lb 3.2 oz)   SpO2 100%   BMI 42.34 kg/m²         Physical Exam:    General: Patient is lying in bed on his left side in no acute distress  Respiratory: Effort even and unlabored,  Left foot dressing is in place.  Betadine is dried but is soaked through the heel on the lateral foot.  Palpation along the lateral foot and plantar surface does not reveal any fluctuance.  No pain elicited.        Results Review:    I reviewed the patient's new clinical results.    Lab Results:    CBC:   Lab Results   Lab 08/05/24  0444 08/08/24  0449 08/09/24  0518 08/10/24  0626 08/11/24  0537   WBC 5.89 8.08 7.27 7.02 6.60   HEMOGLOBIN 9.5* 9.6* 9.9* 10.1* 10.1*   HEMATOCRIT 30.1* 30.5* 31.6* 32.1* 31.3*   PLATELETS 210 242 247 271 248        AutoDiff:   Lab Results   Lab 24   NEUTROPHIL % 64.0   LYMPHOCYTE % 19.9   MONOCYTES % 9.2   EOSINOPHIL % 4.8   BASOPHIL % 0.7   NEUTROS ABS 3.78   LYMPHS ABS 1.17   MONOS ABS 0.54   EOS ABS 0.28   BASOS ABS 0.04        Manual Diff:    Lab Results   Lab 24   NEUTROS ABS 3.78           CMP:   Lab Results   Lab 24  0508 08/09/24  0518 08/10/24  0626 08/11/24  0537   SODIUM 144   < > 141 142 140   POTASSIUM 3.9   < > 4.1 3.8 4.0   CHLORIDE 112*   < > 110* 109* 110*   CO2 21.0*   < > 22.0 23.0 22.0   BUN 20   < > 15 16 17   CREATININE 1.47*   < > 1.34* 1.48* 1.43*   CALCIUM 8.3*   < > 8.4* 8.2* 8.3*   BILIRUBIN 0.3  --   --   --   --    ALK PHOS 102  --   --   --   --    ALT (SGPT) 9  --   --   --   --    AST (SGOT) 16  --   --   --   --    GLUCOSE 89   < > 108* 101* " 133*    < > = values in this interval not displayed.       Estimated Creatinine Clearance: 72 mL/min (A) (by C-G formula based on SCr of 1.43 mg/dL (H)).    Culture Results:    Microbiology Results (last 10 days)       Procedure Component Value - Date/Time    Wound Culture - Drainage, Foot, Left [[id number]]  (Abnormal) Collected: 08/02/24 1338    Lab Status: Final result Specimen: Drainage from Foot, Left Updated: 08/04/24 0857     Wound Culture Light growth (2+) Streptococcus agalactiae (Group B)     Comment:   This organism is considered to be universally susceptible to penicillin.  No further antibiotic testing will be performed. If Clindamycin or Erythromycin is the drug of choice, notify the laboratory within 7 days to request susceptibility testing.        Gram Stain Rare (1+) WBCs seen      No organisms seen    Wound Culture - Wound, Foot, Left [[id number]]  (Abnormal) Collected: 08/02/24 1032    Lab Status: Final result Specimen: Wound from Foot, Left Updated: 08/04/24 0857     Wound Culture Light growth (2+) Streptococcus agalactiae (Group B)     Comment:   This organism is considered to be universally susceptible to penicillin.  No further antibiotic testing will be performed. If Clindamycin or Erythromycin is the drug of choice, notify the laboratory within 7 days to request susceptibility testing.        Gram Stain Few (2+) WBCs seen      Rare (1+) Gram positive cocci    Eosinophil Smear - Urine, Urine, Clean Catch [[id number]]  (Normal) Collected: 08/01/24 1547    Lab Status: Final result Specimen: Urine, Clean Catch Updated: 08/02/24 0149     Eosinophil Smear 0 % EOS/100 Cells                  Radiology:   Imaging Results (Last 72 Hours)       ** No results found for the last 72 hours. **                Active Hospital Problems    Diagnosis     **DKA, type 2, not at goal     E. coli UTI, ESBL     Atrial fibrillation     Chronic heart failure with preserved ejection fraction (HFpEF)     Chronic diastolic  heart failure     GERD without esophagitis     Diabetic ulcer of left foot associated with type 2 diabetes mellitus          IMPRESSION:  Diabetic foot infection-with group B streptococcus.  Patient is status post bedside debridement per SUSAN Hoffmann.  Completed IV antibiotic treatment August 7.  Urinary tract infection with ESBL E. coli.  Documentation reveals that this specimen was obtained via straight catheterization and patient did complain of some urinary frequency.  Completed short antibiotic course.  History of osteomyelitis of left foot with MRSA status posttreatment with vancomycin followed by linezolid.    Uncontrolled diabetes mellitus with hemoglobin A1c greater than 12.    Chronic kidney disease stage IIIb with acute kidney injury on admission that has resolved.  Nephrology continues to follow.    Positive blood cultures for coagulase-negative staph-contaminant        RECOMMENDATION:   Continue local wound care  Will place orders to take photos of foot with next dressing change  No plan for any additional antibiotics at this time unless there is change in his status or change in appearance of foot to suggest infection recurrence.        Julia Adrian MD  08/11/24  12:40 CDT

## 2024-08-11 NOTE — PLAN OF CARE
Goal Outcome Evaluation:      Patient resting well overnight. Up adlib. Voiding per urinal. Safety maintained.

## 2024-08-12 ENCOUNTER — TELEPHONE (OUTPATIENT)
Age: 69
End: 2024-08-12
Payer: COMMERCIAL

## 2024-08-12 VITALS
RESPIRATION RATE: 18 BRPM | SYSTOLIC BLOOD PRESSURE: 165 MMHG | WEIGHT: 312.2 LBS | BODY MASS INDEX: 42.29 KG/M2 | HEART RATE: 80 BPM | DIASTOLIC BLOOD PRESSURE: 85 MMHG | TEMPERATURE: 98.3 F | HEIGHT: 72 IN | OXYGEN SATURATION: 95 %

## 2024-08-12 LAB
ANION GAP SERPL CALCULATED.3IONS-SCNC: 9 MMOL/L (ref 5–15)
BUN SERPL-MCNC: 17 MG/DL (ref 8–23)
BUN/CREAT SERPL: 11.6 (ref 7–25)
CALCIUM SPEC-SCNC: 8.5 MG/DL (ref 8.6–10.5)
CHLORIDE SERPL-SCNC: 109 MMOL/L (ref 98–107)
CO2 SERPL-SCNC: 25 MMOL/L (ref 22–29)
CREAT SERPL-MCNC: 1.47 MG/DL (ref 0.76–1.27)
DEPRECATED RDW RBC AUTO: 49.1 FL (ref 37–54)
EGFRCR SERPLBLD CKD-EPI 2021: 51.6 ML/MIN/1.73
ERYTHROCYTE [DISTWIDTH] IN BLOOD BY AUTOMATED COUNT: 17 % (ref 12.3–15.4)
GLUCOSE BLDC GLUCOMTR-MCNC: 136 MG/DL (ref 70–130)
GLUCOSE BLDC GLUCOMTR-MCNC: 166 MG/DL (ref 70–130)
GLUCOSE BLDC GLUCOMTR-MCNC: 195 MG/DL (ref 70–130)
GLUCOSE SERPL-MCNC: 148 MG/DL (ref 65–99)
HCT VFR BLD AUTO: 32.6 % (ref 37.5–51)
HGB BLD-MCNC: 10.1 G/DL (ref 13–17.7)
MCH RBC QN AUTO: 26.9 PG (ref 26.6–33)
MCHC RBC AUTO-ENTMCNC: 31 G/DL (ref 31.5–35.7)
MCV RBC AUTO: 86.9 FL (ref 79–97)
PLATELET # BLD AUTO: 261 10*3/MM3 (ref 140–450)
PMV BLD AUTO: 11.8 FL (ref 6–12)
POTASSIUM SERPL-SCNC: 3.9 MMOL/L (ref 3.5–5.2)
RBC # BLD AUTO: 3.75 10*6/MM3 (ref 4.14–5.8)
SODIUM SERPL-SCNC: 143 MMOL/L (ref 136–145)
WBC NRBC COR # BLD AUTO: 6.75 10*3/MM3 (ref 3.4–10.8)

## 2024-08-12 PROCEDURE — 63710000001 INSULIN GLARGINE PER 5 UNITS: Performed by: INTERNAL MEDICINE

## 2024-08-12 PROCEDURE — 85027 COMPLETE CBC AUTOMATED: CPT | Performed by: FAMILY MEDICINE

## 2024-08-12 PROCEDURE — 80048 BASIC METABOLIC PNL TOTAL CA: CPT | Performed by: INTERNAL MEDICINE

## 2024-08-12 PROCEDURE — 63710000001 INSULIN REGULAR HUMAN PER 5 UNITS: Performed by: INTERNAL MEDICINE

## 2024-08-12 PROCEDURE — 99232 SBSQ HOSP IP/OBS MODERATE 35: CPT | Performed by: NURSE PRACTITIONER

## 2024-08-12 PROCEDURE — 82948 REAGENT STRIP/BLOOD GLUCOSE: CPT

## 2024-08-12 PROCEDURE — 25010000002 ENOXAPARIN PER 10 MG: Performed by: INTERNAL MEDICINE

## 2024-08-12 RX ORDER — OXYCODONE HYDROCHLORIDE AND ACETAMINOPHEN 5; 325 MG/1; MG/1
1 TABLET ORAL EVERY 6 HOURS PRN
Qty: 6 TABLET | Refills: 0 | Status: SHIPPED | OUTPATIENT
Start: 2024-08-12

## 2024-08-12 RX ORDER — PREGABALIN 100 MG/1
100 CAPSULE ORAL NIGHTLY
Qty: 6 CAPSULE | Refills: 0 | Status: SHIPPED | OUTPATIENT
Start: 2024-08-12

## 2024-08-12 RX ORDER — SODIUM BICARBONATE 650 MG/1
650 TABLET ORAL 3 TIMES DAILY
Start: 2024-08-12

## 2024-08-12 RX ORDER — PREGABALIN 50 MG/1
50 CAPSULE ORAL DAILY
Qty: 6 CAPSULE | Refills: 0
Start: 2024-08-13

## 2024-08-12 RX ADMIN — ENOXAPARIN SODIUM 135 MG: 150 INJECTION SUBCUTANEOUS at 08:40

## 2024-08-12 RX ADMIN — OXYCODONE HYDROCHLORIDE AND ACETAMINOPHEN 1000 MG: 500 TABLET ORAL at 08:39

## 2024-08-12 RX ADMIN — Medication 10 ML: at 08:40

## 2024-08-12 RX ADMIN — PANTOPRAZOLE SODIUM 40 MG: 40 TABLET, DELAYED RELEASE ORAL at 08:39

## 2024-08-12 RX ADMIN — DOCUSATE SODIUM 50 MG AND SENNOSIDES 8.6 MG 2 TABLET: 8.6; 5 TABLET, FILM COATED ORAL at 08:39

## 2024-08-12 RX ADMIN — FAMOTIDINE 20 MG: 20 TABLET ORAL at 08:40

## 2024-08-12 RX ADMIN — SODIUM BICARBONATE 650 MG: 650 TABLET ORAL at 16:56

## 2024-08-12 RX ADMIN — PREGABALIN 50 MG: 25 CAPSULE ORAL at 08:39

## 2024-08-12 RX ADMIN — INSULIN HUMAN 3 UNITS: 100 INJECTION, SOLUTION PARENTERAL at 11:11

## 2024-08-12 RX ADMIN — OXYCODONE HYDROCHLORIDE AND ACETAMINOPHEN 1 TABLET: 5; 325 TABLET ORAL at 08:47

## 2024-08-12 RX ADMIN — OXYCODONE HYDROCHLORIDE AND ACETAMINOPHEN 1 TABLET: 5; 325 TABLET ORAL at 03:03

## 2024-08-12 RX ADMIN — SODIUM BICARBONATE 650 MG: 650 TABLET ORAL at 08:39

## 2024-08-12 RX ADMIN — DONEPEZIL HYDROCHLORIDE 10 MG: 10 TABLET, FILM COATED ORAL at 08:40

## 2024-08-12 RX ADMIN — INSULIN HUMAN 3 UNITS: 100 INJECTION, SOLUTION PARENTERAL at 16:56

## 2024-08-12 RX ADMIN — DULOXETINE HYDROCHLORIDE 60 MG: 30 CAPSULE, DELAYED RELEASE ORAL at 08:39

## 2024-08-12 RX ADMIN — INSULIN GLARGINE 10 UNITS: 100 INJECTION, SOLUTION SUBCUTANEOUS at 08:40

## 2024-08-12 NOTE — DISCHARGE PLACEMENT REQUEST
"To: Erick Meneses (69 y.o. Male)       Date of Birth   1955    Social Security Number       Address   [address] [place] [address]    Home Phone       MRN   [id number]       [facility]    Marital Status                               Admission Date   7/31/24    Admission Type   Emergency    Admitting Provider   Payam Keyes DO    Attending Provider   Payam Keyes DO    Department, Room/Bed   Psychiatric 3C, 365/1       Discharge Date       Discharge Disposition   Skilled Nursing Facility (DC - External)    Discharge Destination                                 Attending Provider: Payam Keyes DO    Allergies: Cefepime, Bactrim [Sulfamethoxazole-trimethoprim], Vancomycin, Zolpidem, Metronidazole    Isolation: Contact   Infection: MRSA (05/19/19), COVID (History) (08/08/22), ESBL E coli (06/30/24)   Code Status: CPR    Ht: 182.9 cm (72\")   Wt: 142 kg (312 lb 3.2 oz)    Admission Cmt: None   Principal Problem: DKA, type 2, not at goal [E11.10]                   Active Insurance as of 7/31/2024       Primary Coverage       Payor Plan Insurance Group Employer/Plan Group    [employer] [facility] EMPLOYEE [id number]       Payor Plan Address Payor Plan Phone Number Payor Plan Fax Number Effective Dates    [address] [phone]  1/1/2022 - None Entered    [facility] [id number]         Subscriber Name Subscriber Birth Date Member ID       ZAINAB TAMAYO 11/27/1969 [id number]               Secondary Coverage       Payor Plan Insurance Group Employer/Plan Group    MEDICARE MEDICARE A & B        Payor Plan Address Payor Plan Phone Number Payor Plan Fax Number Effective Dates    [address] [phone]  7/1/2013 - None Entered    [id number]         Subscriber Name Subscriber Birth Date Member ID       ERICK TAMAYO 1955 [id number]                     Emergency Contacts        (Rel.) Home Phone Work " Phone Mobile Phone    Joan Luong (Spouse) [phone]                 Discharge Summary        Payam Keyes DO at 08/12/24 1334              [facility] Medicine Services  DISCHARGE SUMMARY       Date of Admission: 7/31/2024  Date of Discharge:  8/12/2024  Primary Care Physician: Del Shetty MD    Discharge Diagnoses:  Active Hospital Problems    Diagnosis     **DKA, type 2, not at goal     E. coli UTI, ESBL     Atrial fibrillation     Chronic heart failure with preserved ejection fraction (HFpEF)     Chronic diastolic heart failure     GERD without esophagitis     Diabetic ulcer of left foot associated with type 2 diabetes mellitus          Presenting Problem/History of Present Illness:  DKA, type 2, not at goal [E11.10]     Chief Complaint on Day of Discharge:   Diabetic foot wounds, generalized weakness and difficulty ambulating    History of Present Illness on Day of Discharge:   The patient is at baseline.  He continues to receive care for diabetic foot wounds.  He will require rehab after discharge and is appropriate for discharge today to skilled nursing facility.    Hospital Course  Mr. Luong is a 69-year-old male who presented to [facility] ER via EMS for altered mental status.  It was reported to ER physician by EMS that the patient was was found by his son confused and wandering around in the garage.  Unfortunately family was not available for additional information.   HPI obtained from ER physician, Dr. Dee, who advises that patient presented soiled, confused and unable to provided events leading to his reason for EMS transport.      ER course workup significant for: CBC with elevated WBC of 15.4, low hemoglobin 9.8 and hematocrit 29.6, elevated ESR 57, elevated CRP of 17.  CMP with low sodium 133, elevated BUN of 54 and elevated creatinine 2.64, elevated glucose 704, urine ketones and small amount of acetone. Elevated lactate 2.4  with delta 2.2.  Elevated HS troponin T 342 delta 353.   Urinalysis: Trace blood, positive nitrite, negative leukocytes, +2 bacteria.   CXR: Low lung volumes and vascular crowding.  X-ray of left foot: Ulcer on the sole of the foot posteriorly near the calcaneus.  There is questionable small area of bony erosion along the plantar surface of the calcaneus just proximal to the plantar calcaneal spur.  Osteomyelitis cannot be ruled out.   EKG: A-fib with rapid ventricular response with rate of 128 bpm.      The ICU team was asked to evaluate the patient for AMS, AFIB-RVR, DKA, and open wound to left foot concerning for possible osteomyelitis.      I have seen and evaluated patient upon his arrival to CCU 4 where he appears in no acute distress, breathing is equal and non-labored.  He is slow to provided answers and is intermittently confused. He is able to tell me his name, , and the year after many tries, he is unaware to reason for hospitalization or how he got here.   He currently has insulin, and Cardizem drip infusing. Reports generalized discomfort, worse throughout the abdomen with associated nausea and reports vomiting early today and being sick the last couple days, does not give further details. Abdomen is distended and soft, grossly tender. Denies diarrhea or bloody/dark stool. Reports chest discomfort and shortness of breath. Open stage 2-3 decubitus ulcer to the left heel with associated soft tissue swelling, redness and weeping.      Assessment/Plan   This is a 69-year-old male who presented to [facility] ER via EMS for altered mental status.  PMHx DM type II, atrial fibrillation, CAD, chronic poor healing diabetic left foot ulcer with osteomyelitis, diabetic polyneuropathy, hypertension, hyperlipidemia insomnia, CKD stage IV, BRITTNI-CPAP, cervical spine stenosis, and vitamin D deficiency with numerous hospitalizations this year. PT was found to be confused in  AFIB-RVR, DKA, and open wound to left heal  concerning acute infection with osteomyelitis. Critical care team asked to further manage acute illness.      Patient will be admitted under intensivist MD, Dr. Jamison, case will be discussed in the AM.  Further recommendations and/or modifications to the treatment plan are ultimately at his discretion.     Treatment Plan     AFIB-RVR  -chronic history of atrial fibrillation. On review of PMHx records he was prescribed and taking Eliquis, which has been held due to GI bleeding after which he was suppose to f/u with cardiology about restarting. Epic shows cancelled f/u visits with cardiology, medication is currently not on patients med list.  -Lovenox AFIB coverage ordered in ED.  -Cardizem drip started in ED, rate controlled currently 80-90 bpm.   -likely rapid ventricular response 2/2 acute illness and probable underlying infection.  -Plan to DC Cardizem and transition back to his home dose BB  -elevated troponin in  delta 353, likely type II MI, cardiology consulted in ER, we appreciate their continued input.     ?Diabetic ketoacidosis  -History DM type 2 insulin-dependent  -small amount of acetone, ketones in urine  -do not have beta hydroxybutyrate at this facility  -BS at arrival 704  -normal anion gap  -normal pH 7.41  -Insulin drip started in ER will discontinue and transition to SSI  -likely HHS in the setting of acute illness     Urinary Tract Infection  -urinalysis shows nitrate positive, and +2 bacteria  -Hx of ESBL 6/30/24  -Urine culture pending  -Started on Zosyn/Vanco in the ER will continue  -ID consult     Diabetic foot ulcer  -chronic open wound, DC ulcer stage 2-3 to left heal with larger area soft pale tissue with surrounding edema/erythema and weeping at the outer aspect of the foot along the area of the 5th metatarsal.   -Hx of osteomyelitis and related MRSA bacteremia   -Zosyn/Vanco started in ED will continue   -podiatry consult  -wound care consult     Altered mental status  -likely  related to acute illness/metabolic encephalopathy.  -cannot r/u stroke. No CT head in ER. No focal neuro deficits  -CT head ordered-results pending      Abdominal pain  -reported nausea/vomiting. Abdominal distention and tender on exam  -Hx of pancreatitis  -previous appendectomy  -Hx of GI bleeding, no active bleeding noted  -No CT abd/pel in ER  -CT abd/pel with contrast ordered-results pending     CKD-IV  -bun/creatinine at baseline  -monitor lytes and urine output   -avoid neph toxic medications    Cardiology, nephrology and infectious diseases on consult at the time of admission.  Consultation recommendations are noted in the consult section below.  The patient was continued on vancomycin given his history of MRSA infection in the left foot.  Zosyn was discontinued.  Podiatry was consulted.  Cardiology saw the patient noting that the patient was in atrial fibrillation and elevated heart rate with elevated troponins secondary to diabetic ketoacidosis and his host of other medical issues present on arrival.  No further testing or therapeutic changes are recommended regarding atrial fibrillation.  Nephrology recommended continuing IV fluids and close monitoring of vancomycin level and labs as well as urine electrolytes.  Podiatry recommended deep wound culture during debridement of left foot wounds and topical wound care.  PT and OT were consulted.  Both services recommended SNF placement at discharge.  Renal function returned to baseline as of 8//23.  The patient remained in sinus rhythm and continued to receive wound care throughout his hospital stay.  Infectious diseases started ertapenem on 8/4 to cover both ESBL in urine as well as group B streptococcus.  He remained on ertapenem for 5-day course.  LTAC referral was placed and the patient was denied admission.  He has agreed to SNF placement and SNF referrals of those were made.  It is noted that the patient was unable to take care of his own ones effectively  at home.  He remained on local wound care for the remainder of his hospital stay.  Health system is the only accepting facility and admissions begin working on precertification on 8/9/2024.  The patient was placed on Betadine wet-to-dry dressings to be performed twice daily and he remained on that regimen throughout his hospital stay with improvement in the patient's wound bed.  He has been accepted to [facility] today in transfer for continued wound care and rehabilitation.      Consults:   Cardiology:  ASSESSMENT/PLAN:     1.  Altered mental status due to problem #2, most likely  2.  Diabetic ketoacidosis  3.  Urinary tract infection  4.  Diabetic foot ulcer with radiographic concern for osteomyelitis  5.  Atrial fibrillation with rapid ventricular response, present on arrival  6.  Elevated troponin: No ischemic symptoms, therefore considered to represent myocardial injury in the setting of the above mentioned medical problems  7.  Abdominal pain: Essentially chronic for this patient.  CT scan findings noted above  8.  Stage IV chronic kidney disease     -We are asked to see this patient with atrial fibrillation and elevated heart rate along with elevated troponin: The patient had elevated heart rate upon arrival which is not surprising given his host of medical issues present on arrival including all of the above-mentioned problems.  His heart rate is now improved and the patient is otherwise asymptomatic.  Therefore, I would not recommend any further testing or changes in therapy regarding atrial fibrillation.  Regarding stroke risk reduction, he has not historically been anticoagulated as he is thought to be a very poor candidate for anticoagulation given his host of medical issues including multiple hospital admissions, infections, renal insufficiency, bouts of altered mental status, etc.  Therefore, I would not recommend therapeutic anticoagulation at this time for the patient.  -In regards to elevated troponin: The  "patient frankly denies chest pain.  I am not certain why this was checked by the emergency department provider other than simply following a \"protocol\" to check troponin levels on patient to come to the emergency department.  Without any suspicion of myocardial ischemia, there is simply not a good reason to check a troponin level in this particular patient who has simply had an elevated troponin level at almost every visit to the hospital but more importantly, in this particular case, with worsening renal function on arrival, diabetic ketoacidosis, atrial fibrillation with elevated heart rate, it is not surprising to see a troponin elevation significantly higher than what was previously appreciated.  At this time, no ischemic testing will be offered as the patient simply does not have any ischemic symptoms and I do not feel that this would be beneficial at this time.  -No further cardiac needs at the present time therefore we will be available as needed.  Please feel free to call if there are further questions.      Infectious diseases:  HOSPITAL PROBLEM LIST:       DKA, type 2, not at goal        IMPRESSION:  Confusion on presentation-mental status back to baseline.  Patient with glucose in the 700s on admission.  Likely contributing to his altered mental status.  Suspect infection involving left foot and certainly has portal of entry for bacteria.  Diabetic foot infection with history of osteomyelitis persistent open wound on heel and concerning area of nonviable skin laterally (no rios necrosis) associated with cellulitis.  I had not seen the x-rays of the foot when I examined the patient so did not specifically look at the areas of concern for foreign body  Uncontrolled diabetes mellitus with hemoglobin A1c greater than 12  Complains of abdominal pain-patient has had that off and on previously and has been seen by GI during his admissions.  There is documented colonoscopy from July 2 however patient had " inadequate prep.  Leukocytosis-improved  Chronic kidney disease stage IIIb with acute kidney injury        RECOMMENDATION:   Continue vancomycin  Continue zosyn  Follow-up blood cultures  Reevaluate left foot more closely given concerns for foreign body versus artifact on x-rays  Await wound care evaluation-need to monitor lateral ankle closely.  Heel wound appears to be chronic and fairly clean  Diabetes management per intensivist     Reviewed emergency department notes  Reviewed admission history and physical     Julia Adrian MD    Nephrology:  Assessment   1.  Acute kidney injury/worsening.  2.  Acute tubular necrosis.  3.  Stage IIIb chronic kidney disease baseline.  4.  Type II diabetic nephropathy.  5.  Sepsis related to diabetic foot ulcer.  6.  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  Morbid abdominal obesity.  8.  Anemia of chronic kidney disease.  9.  Poorly controlled type 2 diabetes.     Plan:  1.  Continue IV fluid.  2.  Urinary electrolytes/UACR.  3.  Close monitoring of Vanco level  4.  Continue to monitor renal function.  5.  Baseline iron studies, may need RYANNE.  Also get serum PTH level.  6.  Plan was discussed with the patient and critical care nurse     Thank you for the consult, we appreciate the opportunity to provide care to your patients.  Feel free to contact me if I can be of any further assistance.       Brian Lomas MD    Podiatry:  ASSESSMENT/PLAN   Diabetic foot ulcerations to left foot  Type 2 diabetes mellitus with hyperglycemia  Diabetic polyneuropathy     Review of labs, imaging, and other provider documentation  Comprehensive lower extremity examination and evaluation was performed.     Linear foreign bodies projected over the soft tissues of the second  toe and first toe were noted upon xray imaging (artifacts were also included in the differential). No evidence of soft tissue damage, FB entry, or infection noted to either great or second toes today.      Deep wound  "culture obtained today during debridement of wounds to left foot.     Orders for wound care placed-  Betadine wet-to-dry dressing to be performed twice daily per nursing.     From Podiatry standpoint, no surgical interventions are planned at this time.     Overall recommendation would be to send patient to a skilled nursing facility for ongoing wound care upon discharge.     Thank you kindly for the consult, will continue to follow patient while in house.        This document has been electronically signed by SUSAN Luna on August 2, 2024 16:07 CDT                         Cosigned by: Asad Cerrato DPM         Result Review    Result Review:  I have personally reviewed the results from the time of this admission to 8/12/2024 13:34 CDT and agree with these findings:  []  Laboratory  []  Microbiology  []  Radiology  []  EKG/Telemetry   []  Cardiology/Vascular   []  Pathology  []  Old records  []  Other:    Condition on Discharge:    Stable and improved    Physical Exam on Discharge:  /85 (BP Location: Right arm, Patient Position: Sitting)   Pulse 80   Temp 98.3 °F (36.8 °C) (Oral)   Resp 18   Ht 182.9 cm (72\")   Wt (!) 142 kg (312 lb 3.2 oz)   SpO2 95%   BMI 42.34 kg/m²   Physical Exam       Constitutional:       Appearance: He is obese.   HENT:      Head: Normocephalic and atraumatic.      Right Ear: External ear normal.      Left Ear: External ear normal.      Nose: Nose normal.      Mouth/Throat:      Mouth: Mucous membranes are moist.   Eyes:      Extraocular Movements: Extraocular movements intact.      Conjunctiva/sclera: Conjunctivae normal.      Pupils: Pupils are equal, round, and reactive to light.   Cardiovascular:      Rate and Rhythm: Normal rate and regular rhythm.      Pulses: Normal pulses.      Heart sounds: Normal heart sounds.   Pulmonary:      Effort: Pulmonary effort is normal.   Abdominal:      General: Bowel sounds are normal.      Palpations: Abdomen is soft. "   Musculoskeletal:      Cervical back: Normal range of motion.      Comments: Moves all extremities   Skin:     General: Skin is warm and dry.      Capillary Refill: Capillary refill takes less than 2 seconds.      Comments: Dressing left foot/heel intact.   Neurological:      Mental Status: He is alert and oriented to person, place, and time.   Psychiatric:      Comments: Affect  is flat.     Discharge Disposition:  Skilled Nursing Facility (DC - External)    Discharge Medications:     Discharge Medications        New Medications        Instructions Start Date   apixaban 5 MG tablet tablet  Commonly known as: ELIQUIS   5 mg, Oral, 2 Times Daily      oxyCODONE-acetaminophen 5-325 MG per tablet  Commonly known as: PERCOCET   1 tablet, Oral, Every 6 Hours PRN      sodium bicarbonate 650 MG tablet   650 mg, Oral, 3 Times Daily             Changes to Medications        Instructions Start Date   pregabalin 100 MG capsule  Commonly known as: LYRICA  What changed:   how much to take  when to take this   100 mg, Oral, Nightly      pregabalin 50 MG capsule  Commonly known as: LYRICA  What changed:   medication strength  how much to take   50 mg, Oral, Daily   Start Date: August 13, 2024            Continue These Medications        Instructions Start Date   ascorbic acid 1000 MG tablet  Commonly known as: VITAMIN C   1,000 mg, Oral, Daily      donepezil 10 MG tablet  Commonly known as: ARICEPT   10 mg, Oral, Daily      DULoxetine 60 MG capsule  Commonly known as: CYMBALTA   60 mg, Oral, Daily      famotidine 20 MG tablet  Commonly known as: PEPCID   Take 1 tablet twice a day by oral route.      Insulin Regular Human (Conc) 500 UNIT/ML solution pen-injector CONCENTRATED injection  Commonly known as: HumuLIN R   40 Units, Subcutaneous, 2 Times Daily Before Meals, Inject 40 units under the skin in the the appropriate area with regular meals AND 40 units with large meals.       Lantus SoloStar 100 UNIT/ML injection pen  Generic  drug: Insulin Glargine   20 Units, Subcutaneous, Every Night at Bedtime      melatonin 3 MG tablet   6 mg, Oral, Nightly PRN      nitroglycerin 0.4 MG SL tablet  Commonly known as: NITROSTAT   0.4 mg, Sublingual, Every 5 Minutes PRN, Take no more than 3 doses in 15 minutes.      pantoprazole 40 MG EC tablet  Commonly known as: PROTONIX   40 mg, Oral, Every 12 Hours Scheduled      polyethylene glycol 17 GM/SCOOP powder  Commonly known as: MIRALAX   17 g, Oral, Daily PRN      rosuvastatin 10 MG tablet  Commonly known as: CRESTOR   10 mg, Oral, Nightly      sennosides-docusate 8.6-50 MG per tablet  Commonly known as: PERICOLACE   1 tablet, Oral, Nightly, Obtain OTC             Stop These Medications      bumetanide 1 MG tablet  Commonly known as: BUMEX     busPIRone 10 MG tablet  Commonly known as: BUSPAR     calcitriol 0.5 MCG capsule  Commonly known as: ROCALTROL     carvedilol 3.125 MG tablet  Commonly known as: COREG     Diclofenac Sodium 1 % gel gel  Commonly known as: VOLTAREN     levocetirizine 5 MG tablet  Commonly known as: XYZAL     oxyCODONE 10 MG tablet  Commonly known as: ROXICODONE     sucralfate 1 g tablet  Commonly known as: CARAFATE     Vitron-C  MG tablet  Generic drug: Iron-Vitamin C              Discharge Diet:   Diet Instructions       Diet: Diabetic Diets; Consistent Carbohydrate; Thin (IDDSI 0)      Discharge Diet: Diabetic Diets    Diabetic Diet: Consistent Carbohydrate    Fluid Consistency: Thin (IDDSI 0)            Discharge Care Plan / Instructions:   Discharge to skilled nursing facility    Activity at Discharge:   Activity Instructions       Activity as Tolerated              Follow-up Appointments:  Follow-up with wound care next week    Electronically signed by Payam Keyes DO, 08/12/24, 13:34 CDT.    Time: Discharge over 30 min    Part of this note may be an electronic transcription/translation of spoken language to printed text using the Dragon Dictation  system.        Electronically signed by Payam Keyes DO at 08/12/24 3514

## 2024-08-12 NOTE — PAYOR COMM NOTE
"Erick Luong (69 y.o. Male) [id number]   Continued stay, Please Review, Clinical update,  [phone] FAx [phone]       Date of Birth   1955    Social Security Number       Address   [address] [place] [address]    Home Phone       MRN   [id number]       Denominational   [facility]    Marital Status                               Admission Date   7/31/24    Admission Type   Emergency    Admitting Provider   Payam Keyes DO    Attending Provider   Payam Keyes DO    Department, Room/Bed   [facility] 3C, 365/1       Discharge Date       Discharge Disposition       Discharge Destination                                 Attending Provider: Payam Keyes DO    Allergies: Cefepime, Bactrim [Sulfamethoxazole-trimethoprim], Vancomycin, Zolpidem, Metronidazole    Isolation: Contact   Infection: MRSA (05/19/19), COVID (History) (08/08/22), ESBL E coli (06/30/24)   Code Status: CPR    Ht: 182.9 cm (72\")   Wt: 142 kg (312 lb 3.2 oz)    Admission Cmt: None   Principal Problem: DKA, type 2, not at goal [E11.10]                   Active Insurance as of 7/31/2024       Primary Coverage       Payor Plan Insurance Group Employer/Plan Group    [employer] EMPLOYEE [id number]       Payor Plan Address Payor Plan Phone Number Payor Plan Fax Number Effective Dates    [address] [phone]  1/1/2022 - None Entered    [facility] [id number]         Subscriber Name Subscriber Birth Date Member ID       ZAINAB LUONG 11/27/1969 [id number]               Secondary Coverage       Payor Plan Insurance Group Employer/Plan Group    MEDICARE MEDICARE A & B        Payor Plan Address Payor Plan Phone Number Payor Plan Fax Number Effective Dates    [address] [phone]  7/1/2013 - None Entered    [facility] [id number]         Subscriber Name Subscriber Birth Date Member ID       ERICK LUONG 1955 [id number]               "       Emergency Contacts        (Rel.) Home Phone Work Phone Mobile Phone    Joan Luong (Spouse) [phone]                 Physician Progress Notes (last 48 hours)        Willy Arteaga MD at 08/11/24 1440          Nephrology ([facility]) Progress Note      Patient:  Erick Luong  YOB: 1955  Date of Service: 8/11/2024  MRN: [id number]   Acct: [id number]   Primary Care Physician: Del Shetty MD  Advance Directive:   Code Status and Medical Interventions: CPR (Attempt to Resuscitate); Full Support   Ordered at: 08/01/24 0053     Level Of Support Discussed With:    Patient     Code Status (Patient has no pulse and is not breathing):    CPR (Attempt to Resuscitate)     Medical Interventions (Patient has pulse or is breathing):    Full Support     Admit Date: 7/31/2024       Hospital Day: 11  Referring Provider: Abebe Garzon DO      Patient personally seen and examined.  Complete chart including Consults, Notes, Operative Reports, Labs, Cardiology, and Radiology studies reviewed as able.        Subjective:  Erick Luong is a 69 y.o. male for whom we were consulted for evaluation and treatment of acute kidney injury. Baseline chronic kidney disease stage 3b. Baseline creatinine approximately 1.5-1.8. Follows in our office.  History of poorly controlled type 2 diabetes, hypertension, coronary artery disease, diabetic foot ulcer, obesity.  On 7/31, patient was found wandering at home confused.  He was brought to ER for evaluation. He has a nonhealing left foot diabetic ulcer with serosanguineous drainage. Blood glucose level was >700 with A1c >12%. Initial creatinine was 2.67 and has steadily improved since he was admitted. Nephrology consulted on 8/1. Hospital course remarkable for improving renal function and improved blood glucose levels. Moved to medical floor after stabilization.     Today, no overnight  events.  Hopeful for discharge soon.  Denied current chest pain, shortness of air at rest, nausea or vomiting.  Up in bed without any complaint with legs down.       Allergies:  Cefepime, Bactrim [sulfamethoxazole-trimethoprim], Vancomycin, Zolpidem, and Metronidazole    Home Meds:  Medications Prior to Admission   Medication Sig Dispense Refill Last Dose    ascorbic acid (VITAMIN C) 1000 MG tablet Take 1 tablet by mouth Daily. 30 tablet 3     bumetanide (BUMEX) 1 MG tablet Take 1 tablet by mouth 2 (Two) Times a Day for 30 days. 60 tablet 0     busPIRone (BUSPAR) 10 MG tablet Take 1 tablet by mouth 2 (Two) Times a Day.       calcitriol (ROCALTROL) 0.5 MCG capsule Take 1 capsule by mouth Daily. 90 capsule 4     carvedilol (COREG) 3.125 MG tablet Take 1 tablet twice a day by oral route. 60 tablet 4     donepezil (ARICEPT) 10 MG tablet Take 1 tablet by mouth Daily. 90 tablet 1     DULoxetine (CYMBALTA) 60 MG capsule Take 1 capsule by mouth Daily. 90 capsule 4     famotidine (PEPCID) 20 MG tablet Take 1 tablet twice a day by oral route. 180 tablet 4     Insulin Glargine (Lantus SoloStar) 100 UNIT/ML injection pen Inject 20 Units under the skin into the appropriate area as directed every night at bedtime. 15 mL 3     Insulin Regular Human, Conc, (HumuLIN R) 500 UNIT/ML solution pen-injector CONCENTRATED injection Inject 40 Units under the skin into the appropriate area as directed 2 (Two) Times a Day Before Meals. Inject 40 units under the skin in the the appropriate area with regular meals AND 40 units with large meals.       Iron-Vitamin C (Vitron-C)  MG tablet Take 1 tablet twice a day by oral route. 60 tablet 2     levocetirizine (XYZAL) 5 MG tablet Take 1 tablet by mouth every day at bedtime. 30 tablet 2     melatonin 3 MG tablet Take 2 tablets by mouth At Night As Needed for Sleep. 30 tablet 0     oxyCODONE (ROXICODONE) 10 MG tablet Take 1 tablet by mouth 2 (Two) Times a Day As Needed. Must last 30 days per  md. 55 tablet 0     pantoprazole (PROTONIX) 40 MG EC tablet Take 1 tablet by mouth Every 12 (Twelve) Hours. 180 tablet 4     pregabalin (LYRICA) 100 MG capsule Take 1 capsule by mouth Daily.       pregabalin (LYRICA) 100 MG capsule Take 2 capsules by mouth every night at bedtime.       rosuvastatin (CRESTOR) 10 MG tablet Take 1 tablet by mouth Every Night. 30 tablet 2     sucralfate (CARAFATE) 1 g tablet Take 1 tablet by mouth 4 (Four) Times a Day before meals. 360 tablet 1     Diclofenac Sodium (VOLTAREN) 1 % gel gel Apply 2 g topically to the appropriate area as directed 4 (Four) Times a Day As Needed. 300 g 11     nitroglycerin (NITROSTAT) 0.4 MG SL tablet Place 1 tablet under the tongue Every 5 (Five) Minutes As Needed for Chest Pain. Take no more than 3 doses in 15 minutes.       polyethylene glycol (MIRALAX) 17 GM/SCOOP powder Take 17 g by mouth Daily As Needed (constipation).       sennosides-docusate (PERICOLACE) 8.6-50 MG per tablet Take 1 tablet by mouth Every Night. Obtain OTC          Medicines:  Current Facility-Administered Medications   Medication Dose Route Frequency Provider Last Rate Last Admin    ascorbic acid (VITAMIN C) tablet 1,000 mg  1,000 mg Oral Daily Reuben Garza APRN   1,000 mg at 08/11/24 0828    sennosides-docusate (PERICOLACE) 8.6-50 MG per tablet 2 tablet  2 tablet Oral BID Lamine Figueroa APRN   2 tablet at 08/09/24 0903    And    polyethylene glycol (MIRALAX) packet 17 g  17 g Oral Daily PRN Lamine Figueroa APRN        And    bisacodyl (DULCOLAX) EC tablet 5 mg  5 mg Oral Daily PRN Lamine Figueroa APRN        And    bisacodyl (DULCOLAX) suppository 10 mg  10 mg Rectal Daily PRN Lamine Figueroa APRN   10 mg at 08/02/24 0941    Calcium Replacement - Follow Nurse / BPA Driven Protocol   Does not apply PRN Abebe Garzon DO        dextrose (D50W) (25 g/50 mL) IV injection 10-50 mL  10-50 mL Intravenous Q15 Min PRN Abebe Garzon DO        dextrose (D50W) (25 g/50 mL)  IV injection 25 g  25 g Intravenous Q15 Min PRN Lamine Figueroa APRN        dextrose (GLUTOSE) oral gel 15 g  15 g Oral Q15 Min PRN Lamine Figueroa APRN        donepezil (ARICEPT) tablet 10 mg  10 mg Oral Daily Reuben Garza APRN   10 mg at 08/11/24 0829    DULoxetine (CYMBALTA) DR capsule 60 mg  60 mg Oral Daily Reuben Garza APRN   60 mg at 08/11/24 0828    Enoxaparin Sodium (LOVENOX) syringe 135 mg  1 mg/kg Subcutaneous Q12H Abebe Garzon DO   135 mg at 08/11/24 0831    famotidine (PEPCID) tablet 20 mg  20 mg Oral Q12H Reuben Garza APRN   20 mg at 08/11/24 0829    glucagon (GLUCAGEN) injection 1 mg  1 mg Intramuscular Q15 Min PRN Lamine Figueroa APRN        insulin glargine (LANTUS, SEMGLEE) injection 10 Units  10 Units Subcutaneous Daily Keren Jamison MD   10 Units at 08/11/24 0827    insulin regular (humuLIN R,novoLIN R) injection 3-14 Units  3-14 Units Subcutaneous 4x Daily AC & at Bedtime Keren Jamison MD   3 Units at 08/11/24 1730    ipratropium-albuterol (DUO-NEB) nebulizer solution 3 mL  3 mL Nebulization Q4H PRN Alejandrina Barnett DO   3 mL at 08/11/24 1906    Magnesium Standard Dose Replacement - Follow Nurse / BPA Driven Protocol   Does not apply PRN Abebe Garzon DO        melatonin tablet 2.5 mg  2.5 mg Oral Nightly Reuben Garza APRN   2.5 mg at 08/10/24 2058    nitroglycerin (NITROSTAT) SL tablet 0.4 mg  0.4 mg Sublingual Q5 Min PRN Lamine Figueroa APRN        ondansetron (ZOFRAN) injection 4 mg  4 mg Intravenous Q6H PRN Payam Keyes DO   4 mg at 08/08/24 0900    oxyCODONE-acetaminophen (PERCOCET) 5-325 MG per tablet 1 tablet  1 tablet Oral Q6H PRN Garrett Alicia MD   1 tablet at 08/11/24 0108    pantoprazole (PROTONIX) EC tablet 40 mg  40 mg Oral Q12H Reuben Garza APRN   40 mg at 08/11/24 0829    Pharmacy to Dose enoxaparin (LOVENOX)   Does not apply Continuous PRAbebe Koehler, DO        Phosphorus Replacement - Follow Nurse /  BPA Driven Protocol   Does not apply PRN Abebe Garzon DO        polyethylene glycol (MIRALAX) packet 17 g  17 g Oral Daily Reuben Garza APRN   17 g at 08/09/24 0904    Potassium Replacement - Follow Nurse / BPA Driven Protocol   Does not apply PRN Abebe Garzon DO        pregabalin (LYRICA) capsule 100 mg  100 mg Oral Nightly Reuben Garza APRN   100 mg at 08/10/24 2058    pregabalin (LYRICA) capsule 50 mg  50 mg Oral Daily Reuben Garza APRN   50 mg at 08/11/24 0828    rosuvastatin (CRESTOR) tablet 10 mg  10 mg Oral Nightly Reuben Garza APRN   10 mg at 08/10/24 2058    sodium bicarbonate tablet 650 mg  650 mg Oral TID Brian Lomas MD   650 mg at 08/11/24 1730    sodium chloride 0.9 % flush 10 mL  10 mL Intravenous Q12H Abebe Garzon DO   10 mL at 08/08/24 2023    sodium chloride 0.9 % flush 10 mL  10 mL Intravenous PRN Abebe Garzon DO        sodium chloride 0.9 % flush 10 mL  10 mL Intravenous Q12H Lamine Figueroa APRN   10 mL at 08/11/24 0831    sodium chloride 0.9 % flush 10 mL  10 mL Intravenous PRN Lamine Figueroa APRN        sodium chloride 0.9 % infusion 40 mL  40 mL Intravenous PRN Abebe Garzon DO        sodium chloride 0.9 % infusion 40 mL  40 mL Intravenous PRN Lamine Figueroa APRN           Past Medical History:  Past Medical History:   Diagnosis Date    Arthritis     Autonomic disease     CAD (coronary artery disease) 02/06/2017    Cervical radiculopathy 09/16/2021    Chronic constipation with acute exaccerbation 05/10/2021    Coronary artery disease     Degeneration of cervical intervertebral disc 08/11/2021    Diabetes mellitus     Diabetic foot ulcer 08/31/2020    Diabetic polyneuropathy associated with type 2 diabetes mellitus 01/18/2021    Elevated cholesterol     Gastroesophageal reflux disease 05/13/2019    Headache     HTN (hypertension), benign 05/03/2017    Hyperlipidemia     Hypertension     Mixed hyperlipidemia 02/07/2017    Multiple lung  nodules 01/26/2020    5mm, 9 mm RLL identified 1/2020, not present 10/2019.    Myocardial infarction     Osteomyelitis 01/22/2020    Osteomyelitis of fifth toe of right foot 10/07/2019    Pancreatitis     Persistent insomnia 01/20/2020    Renal disorder     Sleep apnea 02/06/2017    Sleep apnea with use of continuous positive airway pressure (CPAP)     NON-COMPLIANT    Slow transit constipation 01/16/2019    Spinal stenosis in cervical region 09/16/2021    Vitamin D deficiency 03/02/2021       Past Surgical History:  Past Surgical History:   Procedure Laterality Date    ABDOMINAL SURGERY      AMPUTATION FOOT / TOE Left 10/2021    5th digit     ANTERIOR CERVICAL DISCECTOMY W/ FUSION N/A 08/05/2022    Procedure: CERVICAL DISCECTOMY ANTERIOR WITH FUSION C5-6 with possible plating of C5-7 with neuromonitoring and 1 c-arm;  Surgeon: Karel Soliz MD;  Location: [facility] OR;  Service: Neurosurgery;  Laterality: N/A;    APPENDECTOMY      BACK SURGERY      CARDIAC CATHETERIZATION Left 02/08/2021    Procedure: Left Heart Cath w poss intervention left anatomical snuff box acess;  Surgeon: Omkar Charles DO;  Location: [facility] CATH INVASIVE LOCATION;  Service: Cardiology;  Laterality: Left;    CARDIAC SURGERY      CATARACT EXTRACTION      CERVICAL SPINE SURGERY      COLONOSCOPY N/A 01/31/2017    Normal exam repeat in 5 years    COLONOSCOPY N/A 02/11/2019    Mild acute inflammation    COLONOSCOPY N/A 04/07/2024    2 areas at 10 and 20 cm with friability ulceration 2 clips placed at 20 cm and 4 clips at 10 cm poor prep normal mucosa,mild eroisions and ulcerations in visible vessels    COLONOSCOPY N/A 7/1/2024    Procedure: COLONOSCOPY WITH ANESTHESIA;  Surgeon: Arsalan Lorenzo DO;  Location: [facility] ENDOSCOPY;  Service: Gastroenterology;  Laterality: N/A;  pre op constipation/diarrhea  post poor prep  pcp Del Shetty    COLONOSCOPY N/A 7/2/2024    Procedure: COLONOSCOPY WITH ANESTHESIA;  Surgeon: Agapito Christopher  MD CASSIDY;  Location: [facility] END[facility]COPY;  Service: Gastroenterology;  Laterality: N/A;  pre rectal bleeding  post poor prep  pcp Del Shetty MD    COLONOSCOPY W/ POLYPECTOMY  2014    Hyperplastic polyp    CORONARY ARTERY BYPASS GRAFT  10/2015    ENDOSCOPY  2011    Gastritis with hemorrhage    ENDOSCOPY N/A 2017    Normal exam    ENDOSCOPY N/A 2019    Gastritis    ENDOSCOPY N/A 2020    Non-erosive gastritis with hemorrhage    ENDOSCOPY N/A 02/10/2021    Esophagitis    ENDOSCOPY N/A 2024    There were esophageal mucosal changes suspicious for short-segment Low's esophagus present in the distal esophagus. The maximum longitudinal extent of these mucosal changes was 2 cm in length. Mucosa was biopsied with a cold forceps for histologyDistal esophagus, biopsies: Mild chronic active esophagogastritis. No evidence of intestinal metaplasia, dysplasia. Antrum, bx, Mild chronic gastritis    FOOT SURGERY Left     INCISION AND DRAINAGE OF WOUND Left 2007    spider bite       Family History  Family History   Problem Relation Age of Onset    Colon cancer Father     Heart disease Father     Colon cancer Sister     Colon polyps Sister     Alzheimer's disease Mother     Coronary artery disease Sister     Coronary artery disease Sister        Social History  Social History     Socioeconomic History    Marital status:    Tobacco Use    Smoking status: Former     Current packs/day: 0.00     Types: Cigarettes     Quit date:      Years since quittin.6    Smokeless tobacco: Never    Tobacco comments:     [place]   Vaping Use    Vaping status: Never Used   Substance and Sexual Activity    Alcohol use: No    Drug use: No    Sexual activity: Defer       Review of Systems:  History obtained from chart review and the patient  General ROS: No fever or chills  Respiratory ROS: No cough, shortness of breath, wheezing  Cardiovascular ROS: No chest pain or  palpitations  Gastrointestinal ROS: No abdominal pain or melena  Genito-Urinary ROS: No dysuria or hematuria  Psych ROS: No anxiety and depression  14 point ROS reviewed with the patient and negative except as noted above and in the HPI unless unable to obtain.    Objective:  Patient Vitals for the past 24 hrs:   BP Temp Temp src Pulse Resp SpO2 Weight   08/11/24 1919 154/73 97.8 °F (36.6 °C) Oral 88 16 97 % --   08/11/24 1906 -- -- -- 84 16 95 % --   08/11/24 1849 145/76 -- -- 83 -- 100 % --   08/11/24 1545 136/72 97.6 °F (36.4 °C) Oral 76 16 95 % --   08/11/24 1100 157/88 97.7 °F (36.5 °C) Oral 83 16 100 % --   08/11/24 0755 146/61 98.2 °F (36.8 °C) Oral 66 16 94 % --   08/11/24 0423 135/58 97.7 °F (36.5 °C) Oral 70 16 94 % (!) 142 kg (312 lb 3.2 oz)   08/11/24 0025 149/81 98 °F (36.7 °C) Oral 70 16 91 % --   08/10/24 1946 143/75 98 °F (36.7 °C) Oral 89 16 99 % --       Intake/Output Summary (Last 24 hours) at 8/11/2024 1928  Last data filed at 8/11/2024 1919  Gross per 24 hour   Intake 480 ml   Output 775 ml   Net -295 ml     General: awake/alert   Chest:  clear to auscultation bilaterally without respiratory distress  CVS: regular rate and rhythm  Abdominal: soft, nontender, positive bowel sounds, obese  Extremities: tr ble edema  Skin: warm and dry without rash with wounds as documented      Labs:  Results from last 7 days   Lab Units 08/11/24 0537 08/10/24  0626 08/09/24  0518   WBC 10*3/mm3 6.60 7.02 7.27   HEMOGLOBIN g/dL 10.1* 10.1* 9.9*   HEMATOCRIT % 31.3* 32.1* 31.6*   PLATELETS 10*3/mm3 248 271 247         Results from last 7 days   Lab Units 08/11/24  0537 08/10/24  0626 08/09/24  0518 08/06/24  0508 08/05/24  0444   SODIUM mmol/L 140 142 141   < > 144   POTASSIUM mmol/L 4.0 3.8 4.1   < > 3.9   CHLORIDE mmol/L 110* 109* 110*   < > 112*   CO2 mmol/L 22.0 23.0 22.0   < > 21.0*   BUN mg/dL 17 16 15   < > 20   CREATININE mg/dL 1.43* 1.48* 1.34*   < > 1.47*   CALCIUM mg/dL 8.3* 8.2* 8.4*   < > 8.3*   EGFR  "mL/min/1.73 53.4* 51.2* 57.7*   < > 51.6*   BILIRUBIN mg/dL  --   --   --   --  0.3   ALK PHOS U/L  --   --   --   --  102   ALT (SGPT) U/L  --   --   --   --  9   AST (SGOT) U/L  --   --   --   --  16   GLUCOSE mg/dL 133* 101* 108*   < > 89    < > = values in this interval not displayed.       Radiology:   Imaging Results (Last 72 Hours)       ** No results found for the last 72 hours. **            Culture:  No results found for: \"BLOODCX\", \"URINECX\", \"WOUNDCX\", \"MRSACX\", \"RESPCX\", \"STOOLCX\"      Assessment   Acute kidney injury/ATN  Chronic kidney disease stage IIIb  Hypertension  Diabetes type 2  Diabetic foot ulcer with sepsis  Anemia chronic kidney disease  Obesity  Metabolic acidosis     Plan:  Discussed with patient, nursing  Workup reviewed today  Monitor labs  Weaned IV fluids  ID / podiatry evaluation reviewed as current  Antibiotics per ID-finished ertapenem on 8/7        Willy Arteaga MD  8/11/2024  19:28 CDT        Electronically signed by Willy Arteaga MD at 08/11/24 1928       Julia Adrian MD at 08/11/24 1240          INFECTIOUS DISEASES PROGRESS NOTE    Patient:  Erick Luong  YOB: 1955  MRN: [id number]   Admit date: 7/31/2024   Admitting Physician: Alejandrina Barnett DO  Primary Care Physician: Del Shetty MD    Chief Complaint: Left foot wound      Interval History: Patient indicates that he has been up to the chair already today.  When asked about foot pain he said really only his heel hurts.        Allergies:   Allergies   Allergen Reactions    Cefepime Hives and Anaphylaxis    Bactrim [Sulfamethoxazole-Trimethoprim] Other (See Comments)     \"RENAL FAILURE\"    Vancomycin Itching    Zolpidem Mental Status Change     \"makes him crazy\"    Metronidazole Rash       Current Scheduled Medications:   ascorbic acid, 1,000 mg, Oral, Daily  donepezil, 10 mg, Oral, Daily  DULoxetine, 60 mg, Oral, Daily  enoxaparin, 1 mg/kg, Subcutaneous, " "Q12H  famotidine, 20 mg, Oral, Q12H  insulin glargine, 10 Units, Subcutaneous, Daily  insulin regular, 3-14 Units, Subcutaneous, 4x Daily AC & at Bedtime  melatonin, 2.5 mg, Oral, Nightly  pantoprazole, 40 mg, Oral, Q12H  polyethylene glycol, 17 g, Oral, Daily  pregabalin, 100 mg, Oral, Nightly  pregabalin, 50 mg, Oral, Daily  rosuvastatin, 10 mg, Oral, Nightly  senna-docusate sodium, 2 tablet, Oral, BID  sodium bicarbonate, 650 mg, Oral, TID  sodium chloride, 10 mL, Intravenous, Q12H  sodium chloride, 10 mL, Intravenous, Q12H      Current PRN Medications:    senna-docusate sodium **AND** polyethylene glycol **AND** bisacodyl **AND** bisacodyl    Calcium Replacement - Follow Nurse / BPA Driven Protocol    dextrose    dextrose    dextrose    glucagon (human recombinant)    Magnesium Standard Dose Replacement - Follow Nurse / BPA Driven Protocol    nitroglycerin    ondansetron    oxyCODONE-acetaminophen    Pharmacy to Dose enoxaparin (LOVENOX)    Phosphorus Replacement - Follow Nurse / BPA Driven Protocol    Potassium Replacement - Follow Nurse / BPA Driven Protocol    sodium chloride    sodium chloride    sodium chloride    sodium chloride    Pharmacy to Dose enoxaparin (LOVENOX),            Objective     Vital Signs:  Temp (24hrs), Av.8 °F (36.6 °C), Min:97.3 °F (36.3 °C), Max:98.2 °F (36.8 °C)      /88 (BP Location: Right arm, Patient Position: Sitting)   Pulse 83   Temp 97.7 °F (36.5 °C) (Oral)   Resp 16   Ht 182.9 cm (72\")   Wt (!) 142 kg (312 lb 3.2 oz)   SpO2 100%   BMI 42.34 kg/m²         Physical Exam:    General: Patient is lying in bed on his left side in no acute distress  Respiratory: Effort even and unlabored,  Left foot dressing is in place.  Betadine is dried but is soaked through the heel on the lateral foot.  Palpation along the lateral foot and plantar surface does not reveal any fluctuance.  No pain elicited.        Results Review:    I reviewed the patient's new clinical " results.    Lab Results:    CBC:   Lab Results   Lab 08/05/24 0444 08/08/24  0449 08/09/24  0518 08/10/24  0626 08/11/24  0537   WBC 5.89 8.08 7.27 7.02 6.60   HEMOGLOBIN 9.5* 9.6* 9.9* 10.1* 10.1*   HEMATOCRIT 30.1* 30.5* 31.6* 32.1* 31.3*   PLATELETS 210 242 247 271 248        AutoDiff:   Lab Results   Lab 08/05/24 0444   NEUTROPHIL % 64.0   LYMPHOCYTE % 19.9   MONOCYTES % 9.2   EOSINOPHIL % 4.8   BASOPHIL % 0.7   NEUTROS ABS 3.78   LYMPHS ABS 1.17   MONOS ABS 0.54   EOS ABS 0.28   BASOS ABS 0.04        Manual Diff:    Lab Results   Lab 08/05/24 0444   NEUTROS ABS 3.78           CMP:   Lab Results   Lab 08/05/24 0444 08/06/24  0508 08/09/24  0518 08/10/24  0626 08/11/24  0537   SODIUM 144   < > 141 142 140   POTASSIUM 3.9   < > 4.1 3.8 4.0   CHLORIDE 112*   < > 110* 109* 110*   CO2 21.0*   < > 22.0 23.0 22.0   BUN 20   < > 15 16 17   CREATININE 1.47*   < > 1.34* 1.48* 1.43*   CALCIUM 8.3*   < > 8.4* 8.2* 8.3*   BILIRUBIN 0.3  --   --   --   --    ALK PHOS 102  --   --   --   --    ALT (SGPT) 9  --   --   --   --    AST (SGOT) 16  --   --   --   --    GLUCOSE 89   < > 108* 101* 133*    < > = values in this interval not displayed.       Estimated Creatinine Clearance: 72 mL/min (A) (by C-G formula based on SCr of 1.43 mg/dL (H)).    Culture Results:    Microbiology Results (last 10 days)       Procedure Component Value - Date/Time    Wound Culture - Drainage, Foot, Left [[id number]]  (Abnormal) Collected: 08/02/24 1338    Lab Status: Final result Specimen: Drainage from Foot, Left Updated: 08/04/24 0857     Wound Culture Light growth (2+) Streptococcus agalactiae (Group B)     Comment:   This organism is considered to be universally susceptible to penicillin.  No further antibiotic testing will be performed. If Clindamycin or Erythromycin is the drug of choice, notify the laboratory within 7 days to request susceptibility testing.        Gram Stain Rare (1+) WBCs seen      No organisms seen    Wound Culture -  Wound, Foot, Left [[id number]]  (Abnormal) Collected: 08/02/24 1032    Lab Status: Final result Specimen: Wound from Foot, Left Updated: 08/04/24 0857     Wound Culture Light growth (2+) Streptococcus agalactiae (Group B)     Comment:   This organism is considered to be universally susceptible to penicillin.  No further antibiotic testing will be performed. If Clindamycin or Erythromycin is the drug of choice, notify the laboratory within 7 days to request susceptibility testing.        Gram Stain Few (2+) WBCs seen      Rare (1+) Gram positive cocci    Eosinophil Smear - Urine, Urine, Clean Catch [[id number]]  (Normal) Collected: 08/01/24 1547    Lab Status: Final result Specimen: Urine, Clean Catch Updated: 08/02/24 0149     Eosinophil Smear 0 % EOS/100 Cells                  Radiology:   Imaging Results (Last 72 Hours)       ** No results found for the last 72 hours. **                [facility] Problems    Diagnosis     **DKA, type 2, not at goal     E. coli UTI, ESBL     Atrial fibrillation     Chronic heart failure with preserved ejection fraction (HFpEF)     Chronic diastolic heart failure     GERD without esophagitis     Diabetic ulcer of left foot associated with type 2 diabetes mellitus          IMPRESSION:  Diabetic foot infection-with group B streptococcus.  Patient is status post bedside debridement per SUSAN Hoffmann.  Completed IV antibiotic treatment August 7.  Urinary tract infection with ESBL E. coli.  Documentation reveals that this specimen was obtained via straight catheterization and patient did complain of some urinary frequency.  Completed short antibiotic course.  History of osteomyelitis of left foot with MRSA status posttreatment with vancomycin followed by linezolid.    Uncontrolled diabetes mellitus with hemoglobin A1c greater than 12.    Chronic kidney disease stage IIIb with acute kidney injury on admission that has resolved.  Nephrology continues to follow.    Positive blood  "cultures for coagulase-negative staph-contaminant        RECOMMENDATION:   Continue local wound care  Will place orders to take photos of foot with next dressing change  No plan for any additional antibiotics at this time unless there is change in his status or change in appearance of foot to suggest infection recurrence.        Julia Adrian MD  08/11/24  12:40 CDT        Electronically signed by Julia Adrian MD at 08/11/24 1319       Alejandrina Barnett DO at 08/11/24 1058              [facility] Medicine Services  INPATIENT PROGRESS NOTE    Patient Name: Erick Luong  Date of Admission: 7/31/2024  Today's Date: 08/11/24  Length of Stay: 11  Primary Care Physician: Del Shetty MD    Subjective   Chief Complaint: diabetic foot ulcer.   HPI     Patient relates he did walk yesterday.  Did \"ok\"  No new problems.  Awaiting nursing home placement      Review of Systems   All pertinent negatives and positives are as above. All other systems have been reviewed and are negative unless otherwise stated.     Objective    Temp:  [97.3 °F (36.3 °C)-98.2 °F (36.8 °C)] 98.2 °F (36.8 °C)  Heart Rate:  [66-90] 66  Resp:  [16] 16  BP: (135-164)/(58-81) 146/61  Physical Exam  Vitals and nursing note reviewed.   Constitutional:       Appearance: He is obese.   HENT:      Head: Normocephalic and atraumatic.      Right Ear: External ear normal.      Left Ear: External ear normal.      Nose: Nose normal.      Mouth/Throat:      Mouth: Mucous membranes are moist.   Eyes:      Extraocular Movements: Extraocular movements intact.      Conjunctiva/sclera: Conjunctivae normal.      Pupils: Pupils are equal, round, and reactive to light.   Cardiovascular:      Rate and Rhythm: Normal rate and regular rhythm.      Pulses: Normal pulses.      Heart sounds: Normal heart sounds.   Pulmonary:      Effort: Pulmonary effort is normal.   Abdominal:      General: Bowel sounds are normal.      " Palpations: Abdomen is soft.   Musculoskeletal:      Cervical back: Normal range of motion.      Comments: Moves all extremities   Skin:     General: Skin is warm and dry.      Capillary Refill: Capillary refill takes less than 2 seconds.      Comments: Dressing left foot/heel intact.   Neurological:      Mental Status: He is alert and oriented to person, place, and time.   Psychiatric:      Comments: Affect  is flat.             File Link    Scan on 8/7/2024 1702 by Aicha Smallwood RN: Wound Left lateral foot Diabetic Ulcer        Key Information    Document ID File Type Document Type Description   [id number] Image WOUND IMAGE Wound Left lateral foot Diabetic Ulcer     Import Information    Attached At Date Time User Dept   Encounter Level 8/7/2024  5:02 PM Aicha Smallwood RN [facility]     Encounter    Hospital Encounter on 7/31/24             Results Review:  I have reviewed the labs, radiology results, and diagnostic studies.    Laboratory Data:   Results from last 7 days   Lab Units 08/11/24  0537 08/10/24  0626 08/09/24  0518   WBC 10*3/mm3 6.60 7.02 7.27   HEMOGLOBIN g/dL 10.1* 10.1* 9.9*   HEMATOCRIT % 31.3* 32.1* 31.6*   PLATELETS 10*3/mm3 248 271 247        Results from last 7 days   Lab Units 08/11/24  0537 08/10/24  0626 08/09/24  0518 08/06/24  0508 08/05/24  0444   SODIUM mmol/L 140 142 141   < > 144   POTASSIUM mmol/L 4.0 3.8 4.1   < > 3.9   CHLORIDE mmol/L 110* 109* 110*   < > 112*   CO2 mmol/L 22.0 23.0 22.0   < > 21.0*   BUN mg/dL 17 16 15   < > 20   CREATININE mg/dL 1.43* 1.48* 1.34*   < > 1.47*   CALCIUM mg/dL 8.3* 8.2* 8.4*   < > 8.3*   BILIRUBIN mg/dL  --   --   --   --  0.3   ALK PHOS U/L  --   --   --   --  102   ALT (SGPT) U/L  --   --   --   --  9   AST (SGOT) U/L  --   --   --   --  16   GLUCOSE mg/dL 133* 101* 108*   < > 89    < > = values in this interval not displayed.       Culture Data:   Blood Culture   Date Value Ref Range Status   07/31/2024 No growth at 5 days   Final   07/31/2024 Staphylococcus, coagulase negative (C)  Final     Urine Culture   Date Value Ref Range Status   07/31/2024 >100,000 CFU/mL Escherichia coli ESBL (A)  Final     Wound Culture   Date Value Ref Range Status   08/02/2024 (A)  Final    Light growth (2+) Streptococcus agalactiae (Group B)     Comment:       This organism is considered to be universally susceptible to penicillin.  No further antibiotic testing will be performed. If Clindamycin or Erythromycin is the drug of choice, notify the laboratory within 7 days to request susceptibility testing.   08/02/2024 (A)  Final    Light growth (2+) Streptococcus agalactiae (Group B)     Comment:       This organism is considered to be universally susceptible to penicillin.  No further antibiotic testing will be performed. If Clindamycin or Erythromycin is the drug of choice, notify the laboratory within 7 days to request susceptibility testing.       Radiology Data:   Imaging Results (Last 24 Hours)       ** No results found for the last 24 hours. **            I have reviewed the patient's current medications.     Assessment/Plan   Assessment  Active Hospital Problems    Diagnosis     **DKA, type 2, not at goal     E. coli UTI, ESBL     Atrial fibrillation     Chronic heart failure with preserved ejection fraction (HFpEF)     Chronic diastolic heart failure     GERD without esophagitis     Diabetic ulcer of left foot associated with type 2 diabetes mellitus        Treatment Plan  Continue current treatment of foot wound.  Continue current diabetic management.  Continue to await nursing home placement.  Currently waiting on pre-CERT.       Medical Decision Making  Number and Complexity of problems:   3 acute, high complexity problems  4+ chronic, moderate complexity problems     Differential Diagnosis: None     Conditions and Status        Condition is improving.     MDM Data  External documents reviewed: Care Everywhere  Cardiac tracing (EKG, telemetry)  interpretation: See HPI  Radiology interpretation: See HPI  Labs reviewed: See HPI  Any tests that were considered but not ordered: None     Decision rules/scores evaluated (example [id number], Wells, etc): None     Discussed with: The patient     Care Planning  Shared decision making: Patient  Code status and discussions: Full code     Disposition  Social Determinants of Health that impact treatment or disposition: none     I expect the patient to be discharged to SNF  in 1 days.    Electronically signed by Alejandrina Barnett DO, 08/11/24, 10:58 CDT.      Electronically signed by Alejandrina Barnett DO at 08/11/24 1059       Willy Arteaga MD at 08/10/24 1605          Nephrology ([facility]) Progress Note      Patient:  Erick Luong  YOB: 1955  Date of Service: 8/10/2024  MRN: [id number]   Acct: [id number]   Primary Care Physician: Del Shetty MD  Advance Directive:   Code Status and Medical Interventions: CPR (Attempt to Resuscitate); Full Support   Ordered at: 08/01/24 0053     Level Of Support Discussed With:    Patient     Code Status (Patient has no pulse and is not breathing):    CPR (Attempt to Resuscitate)     Medical Interventions (Patient has pulse or is breathing):    Full Support     Admit Date: 7/31/2024       Hospital Day: 10  Referring Provider: Abebe Garzon DO      Patient personally seen and examined.  Complete chart including Consults, Notes, Operative Reports, Labs, Cardiology, and Radiology studies reviewed as able.        Subjective:  Erick Luong is a 69 y.o. male for whom we were consulted for evaluation and treatment of acute kidney injury. Baseline chronic kidney disease stage 3b. Baseline creatinine approximately 1.5-1.8. Follows in our office.  History of poorly controlled type 2 diabetes, hypertension, coronary artery disease, diabetic foot ulcer, obesity.  On 7/31, patient was found wandering at home confused.  He  was brought to ER for evaluation. He has a nonhealing left foot diabetic ulcer with serosanguineous drainage. Blood glucose level was >700 with A1c >12%. Initial creatinine was 2.67 and has steadily improved since he was admitted. Nephrology consulted on 8/1. Hospital course remarkable for improving renal function and improved blood glucose levels. Moved to medical floor after stabilization.     Today, no overnight events.  Hopeful for discharge soon.  Denied current chest pain, shortness of air at rest, nausea or vomiting.  Up in bed without any complaint.       Allergies:  Cefepime, Bactrim [sulfamethoxazole-trimethoprim], Vancomycin, Zolpidem, and Metronidazole    Home Meds:  Medications Prior to Admission   Medication Sig Dispense Refill Last Dose    ascorbic acid (VITAMIN C) 1000 MG tablet Take 1 tablet by mouth Daily. 30 tablet 3     bumetanide (BUMEX) 1 MG tablet Take 1 tablet by mouth 2 (Two) Times a Day for 30 days. 60 tablet 0     busPIRone (BUSPAR) 10 MG tablet Take 1 tablet by mouth 2 (Two) Times a Day.       calcitriol (ROCALTROL) 0.5 MCG capsule Take 1 capsule by mouth Daily. 90 capsule 4     carvedilol (COREG) 3.125 MG tablet Take 1 tablet twice a day by oral route. 60 tablet 4     donepezil (ARICEPT) 10 MG tablet Take 1 tablet by mouth Daily. 90 tablet 1     DULoxetine (CYMBALTA) 60 MG capsule Take 1 capsule by mouth Daily. 90 capsule 4     famotidine (PEPCID) 20 MG tablet Take 1 tablet twice a day by oral route. 180 tablet 4     Insulin Glargine (Lantus SoloStar) 100 UNIT/ML injection pen Inject 20 Units under the skin into the appropriate area as directed every night at bedtime. 15 mL 3     Insulin Regular Human, Conc, (HumuLIN R) 500 UNIT/ML solution pen-injector CONCENTRATED injection Inject 40 Units under the skin into the appropriate area as directed 2 (Two) Times a Day Before Meals. Inject 40 units under the skin in the the appropriate area with regular meals AND 40 units with large meals.        Iron-Vitamin C (Vitron-C)  MG tablet Take 1 tablet twice a day by oral route. 60 tablet 2     levocetirizine (XYZAL) 5 MG tablet Take 1 tablet by mouth every day at bedtime. 30 tablet 2     melatonin 3 MG tablet Take 2 tablets by mouth At Night As Needed for Sleep. 30 tablet 0     oxyCODONE (ROXICODONE) 10 MG tablet Take 1 tablet by mouth 2 (Two) Times a Day As Needed. Must last 30 days per md. 55 tablet 0     pantoprazole (PROTONIX) 40 MG EC tablet Take 1 tablet by mouth Every 12 (Twelve) Hours. 180 tablet 4     pregabalin (LYRICA) 100 MG capsule Take 1 capsule by mouth Daily.       pregabalin (LYRICA) 100 MG capsule Take 2 capsules by mouth every night at bedtime.       rosuvastatin (CRESTOR) 10 MG tablet Take 1 tablet by mouth Every Night. 30 tablet 2     sucralfate (CARAFATE) 1 g tablet Take 1 tablet by mouth 4 (Four) Times a Day before meals. 360 tablet 1     Diclofenac Sodium (VOLTAREN) 1 % gel gel Apply 2 g topically to the appropriate area as directed 4 (Four) Times a Day As Needed. 300 g 11     nitroglycerin (NITROSTAT) 0.4 MG SL tablet Place 1 tablet under the tongue Every 5 (Five) Minutes As Needed for Chest Pain. Take no more than 3 doses in 15 minutes.       polyethylene glycol (MIRALAX) 17 GM/SCOOP powder Take 17 g by mouth Daily As Needed (constipation).       sennosides-docusate (PERICOLACE) 8.6-50 MG per tablet Take 1 tablet by mouth Every Night. Obtain OTC          Medicines:  Current Facility-Administered Medications   Medication Dose Route Frequency Provider Last Rate Last Admin    ascorbic acid (VITAMIN C) tablet 1,000 mg  1,000 mg Oral Daily Reuben Garza APRN   1,000 mg at 08/10/24 0940    sennosides-docusate (PERICOLACE) 8.6-50 MG per tablet 2 tablet  2 tablet Oral BID Lamine Figueroa APRN   2 tablet at 08/09/24 0903    And    polyethylene glycol (MIRALAX) packet 17 g  17 g Oral Daily PRN Lamine Figueroa APRN        And    bisacodyl (DULCOLAX) EC tablet 5 mg  5 mg Oral Daily  PRN Lamine Figueroa APRN        And    bisacodyl (DULCOLAX) suppository 10 mg  10 mg Rectal Daily PRN Lamine Figueroa APRN   10 mg at 08/02/24 0941    Calcium Replacement - Follow Nurse / BPA Driven Protocol   Does not apply PRN Abebe Garzon DO        dextrose (D50W) (25 g/50 mL) IV injection 10-50 mL  10-50 mL Intravenous Q15 Min PRN Abebe Garzon DO        dextrose (D50W) (25 g/50 mL) IV injection 25 g  25 g Intravenous Q15 Min PRN Lamine Figueroa APRN        dextrose (GLUTOSE) oral gel 15 g  15 g Oral Q15 Min PRN Lamine Figueroa APRN        donepezil (ARICEPT) tablet 10 mg  10 mg Oral Daily Reuben Garza APRN   10 mg at 08/10/24 0940    DULoxetine (CYMBALTA) DR capsule 60 mg  60 mg Oral Daily Reuben Garza APRN   60 mg at 08/10/24 0940    Enoxaparin Sodium (LOVENOX) syringe 135 mg  1 mg/kg Subcutaneous Q12H Abebe Garzon DO   135 mg at 08/10/24 0939    famotidine (PEPCID) tablet 20 mg  20 mg Oral Q12H Reuben Garza APRN   20 mg at 08/10/24 0940    glucagon (GLUCAGEN) injection 1 mg  1 mg Intramuscular Q15 Min PRN Lamine Figueroa APRN        insulin glargine (LANTUS, SEMGLEE) injection 10 Units  10 Units Subcutaneous Daily Keren Jamison MD   10 Units at 08/10/24 0939    insulin regular (humuLIN R,novoLIN R) injection 3-14 Units  3-14 Units Subcutaneous 4x Daily AC & at Bedtime Keren Jamison MD   3 Units at 08/10/24 1741    Magnesium Standard Dose Replacement - Follow Nurse / BPA Driven Protocol   Does not apply PRN Abebe Garzon DO        melatonin tablet 2.5 mg  2.5 mg Oral Nightly Reuben Garza APRN   2.5 mg at 08/09/24 2118    nitroglycerin (NITROSTAT) SL tablet 0.4 mg  0.4 mg Sublingual Q5 Min PRN Lamine Figueroa APRN        ondansetron (ZOFRAN) injection 4 mg  4 mg Intravenous Q6H PRN Payam Keyes DO   4 mg at 08/08/24 0900    oxyCODONE (ROXICODONE) immediate release tablet 10 mg  10 mg Oral BID Lamine Figueroa APRN   10 mg at 08/10/24 0940     oxyCODONE-acetaminophen (PERCOCET) 5-325 MG per tablet 1 tablet  1 tablet Oral Q6H PRN Garrett Alicia MD   1 tablet at 08/10/24 0331    pantoprazole (PROTONIX) EC tablet 40 mg  40 mg Oral Q12H Reuben Garza APRN   40 mg at 08/10/24 0940    Pharmacy to Dose enoxaparin (LOVENOX)   Does not apply Continuous PRN Abebe Garzon DO        Phosphorus Replacement - Follow Nurse / BPA Driven Protocol   Does not apply PRN Abebe Garzon DO        polyethylene glycol (MIRALAX) packet 17 g  17 g Oral Daily Reuben Garza APRN   17 g at 08/09/24 0904    Potassium Replacement - Follow Nurse / BPA Driven Protocol   Does not apply PRN Abebe Garzon DO        pregabalin (LYRICA) capsule 100 mg  100 mg Oral Nightly Reuben Garza APRN   100 mg at 08/09/24 2117    pregabalin (LYRICA) capsule 50 mg  50 mg Oral Daily Reuben Garza APRN   50 mg at 08/10/24 0940    rosuvastatin (CRESTOR) tablet 10 mg  10 mg Oral Nightly Reuben Garza APRN   10 mg at 08/09/24 2117    sodium bicarbonate tablet 650 mg  650 mg Oral TID Brian Lomas MD   650 mg at 08/10/24 1741    sodium chloride 0.9 % flush 10 mL  10 mL Intravenous Q12H Abebe Garzon DO   10 mL at 08/08/24 2023    sodium chloride 0.9 % flush 10 mL  10 mL Intravenous PRN Abebe Garzon DO        sodium chloride 0.9 % flush 10 mL  10 mL Intravenous Q12H Lamine Figueroa APRN   10 mL at 08/10/24 0947    sodium chloride 0.9 % flush 10 mL  10 mL Intravenous PRN Lamine Figueroa APRN        sodium chloride 0.9 % infusion 40 mL  40 mL Intravenous PRN Abebe Garzon DO        sodium chloride 0.9 % infusion 40 mL  40 mL Intravenous PRN Lamine Figueroa APRN           Past Medical History:  Past Medical History:   Diagnosis Date    Arthritis     Autonomic disease     CAD (coronary artery disease) 02/06/2017    Cervical radiculopathy 09/16/2021    Chronic constipation with acute exaccerbation 05/10/2021    Coronary artery disease      Degeneration of cervical intervertebral disc 08/11/2021    Diabetes mellitus     Diabetic foot ulcer 08/31/2020    Diabetic polyneuropathy associated with type 2 diabetes mellitus 01/18/2021    Elevated cholesterol     Gastroesophageal reflux disease 05/13/2019    Headache     HTN (hypertension), benign 05/03/2017    Hyperlipidemia     Hypertension     Mixed hyperlipidemia 02/07/2017    Multiple lung nodules 01/26/2020    5mm, 9 mm RLL identified 1/2020, not present 10/2019.    Myocardial infarction     Osteomyelitis 01/22/2020    Osteomyelitis of fifth toe of right foot 10/07/2019    Pancreatitis     Persistent insomnia 01/20/2020    Renal disorder     Sleep apnea 02/06/2017    Sleep apnea with use of continuous positive airway pressure (CPAP)     NON-COMPLIANT    Slow transit constipation 01/16/2019    Spinal stenosis in cervical region 09/16/2021    Vitamin D deficiency 03/02/2021       Past Surgical History:  Past Surgical History:   Procedure Laterality Date    ABDOMINAL SURGERY      AMPUTATION FOOT / TOE Left 10/2021    5th digit     ANTERIOR CERVICAL DISCECTOMY W/ FUSION N/A 08/05/2022    Procedure: CERVICAL DISCECTOMY ANTERIOR WITH FUSION C5-6 with possible plating of C5-7 with neuromonitoring and 1 c-arm;  Surgeon: Karel Soliz MD;  Location:  [facility] OR;  Service: Neurosurgery;  Laterality: N/A;    APPENDECTOMY      BACK SURGERY      CARDIAC CATHETERIZATION Left 02/08/2021    Procedure: Left Heart Cath w poss intervention left anatomical snuff box acess;  Surgeon: Omkar Charles DO;  Location:  [facility] CATH INVASIVE LOCATION;  Service: Cardiology;  Laterality: Left;    CARDIAC SURGERY      CATARACT EXTRACTION      CERVICAL SPINE SURGERY      COLONOSCOPY N/A 01/31/2017    Normal exam repeat in 5 years    COLONOSCOPY N/A 02/11/2019    Mild acute inflammation    COLONOSCOPY N/A 04/07/2024    2 areas at 10 and 20 cm with friability ulceration 2 clips placed at 20 cm and 4 clips at 10 cm poor prep  normal mucosa,mild eroisions and ulcerations in visible vessels    COLONOSCOPY N/A 2024    Procedure: COLONOSCOPY WITH ANESTHESIA;  Surgeon: Arsalan Lorenzo DO;  Location: [facility] ENDOSCOPY;  Service: Gastroenterology;  Laterality: N/A;  pre op constipation/diarrhea  post poor prep  pcp Del Shetty    COLONOSCOPY N/A 2024    Procedure: COLONOSCOPY WITH ANESTHESIA;  Surgeon: Agapito Christopher MD;  Location: [facility] ENDOSCOPY;  Service: Gastroenterology;  Laterality: N/A;  pre rectal bleeding  post poor prep  pcp Del Shetty MD    COLONOSCOPY W/ POLYPECTOMY  2014    Hyperplastic polyp    CORONARY ARTERY BYPASS GRAFT  10/2015    ENDOSCOPY  2011    Gastritis with hemorrhage    ENDOSCOPY N/A 2017    Normal exam    ENDOSCOPY N/A 2019    Gastritis    ENDOSCOPY N/A 2020    Non-erosive gastritis with hemorrhage    ENDOSCOPY N/A 02/10/2021    Esophagitis    ENDOSCOPY N/A 2024    There were esophageal mucosal changes suspicious for short-segment Low's esophagus present in the distal esophagus. The maximum longitudinal extent of these mucosal changes was 2 cm in length. Mucosa was biopsied with a cold forceps for histologyDistal esophagus, biopsies: Mild chronic active esophagogastritis. No evidence of intestinal metaplasia, dysplasia. Antrum, bx, Mild chronic gastritis    FOOT SURGERY Left     INCISION AND DRAINAGE OF WOUND Left 2007    spider bite       Family History  Family History   Problem Relation Age of Onset    Colon cancer Father     Heart disease Father     Colon cancer Sister     Colon polyps Sister     Alzheimer's disease Mother     Coronary artery disease Sister     Coronary artery disease Sister        Social History  Social History     Socioeconomic History    Marital status:    Tobacco Use    Smoking status: Former     Current packs/day: 0.00     Types: Cigarettes     Quit date:      Years since quittin.6    Smokeless tobacco: Never     Tobacco comments:     smoked in highschool   Vaping Use    Vaping status: Never Used   Substance and Sexual Activity    Alcohol use: No    Drug use: No    Sexual activity: Defer       Review of Systems:  History obtained from chart review and the patient  General ROS: No fever or chills  Respiratory ROS: No cough, shortness of breath, wheezing  Cardiovascular ROS: No chest pain or palpitations  Gastrointestinal ROS: No abdominal pain or melena  Genito-Urinary ROS: No dysuria or hematuria  Psych ROS: No anxiety and depression  14 point ROS reviewed with the patient and negative except as noted above and in the HPI unless unable to obtain.    Objective:  Patient Vitals for the past 24 hrs:   BP Temp Temp src Pulse Resp SpO2 Weight   08/10/24 1601 164/78 97.3 °F (36.3 °C) Oral 90 16 97 % --   08/10/24 1052 135/65 98 °F (36.7 °C) Oral 71 16 94 % --   08/10/24 0751 134/58 97.9 °F (36.6 °C) Oral 72 16 94 % --   08/10/24 0429 113/55 98.4 °F (36.9 °C) Oral 71 16 98 % (!) 136 kg (300 lb 14.4 oz)   08/10/24 0106 142/78 97.9 °F (36.6 °C) Oral 79 16 94 % --   08/09/24 1948 155/93 98.1 °F (36.7 °C) Oral 82 16 94 % --       Intake/Output Summary (Last 24 hours) at 8/10/2024 1750  Last data filed at 8/10/2024 1601  Gross per 24 hour   Intake 480 ml   Output 575 ml   Net -95 ml     General: awake/alert   Chest:  clear to auscultation bilaterally without respiratory distress  CVS: regular rate and rhythm  Abdominal: soft, nontender, positive bowel sounds, obese  Extremities: tr ble edema  Skin: warm and dry without rash with wounds as documented      Labs:  Results from last 7 days   Lab Units 08/10/24  0626 08/09/24  0518 08/08/24  0449   WBC 10*3/mm3 7.02 7.27 8.08   HEMOGLOBIN g/dL 10.1* 9.9* 9.6*   HEMATOCRIT % 32.1* 31.6* 30.5*   PLATELETS 10*3/mm3 271 247 242         Results from last 7 days   Lab Units 08/10/24  0626 08/09/24  0518 08/08/24  0449 08/06/24  0508 08/05/24  0444 08/04/24  0615   SODIUM mmol/L 142 141 141   < >  "144 145   POTASSIUM mmol/L 3.8 4.1 3.8   < > 3.9 3.9   CHLORIDE mmol/L 109* 110* 109*   < > 112* 111*   CO2 mmol/L 23.0 22.0 21.0*   < > 21.0* 21.0*   BUN mg/dL 16 15 15   < > 20 24*   CREATININE mg/dL 1.48* 1.34* 1.42*   < > 1.47* 1.60*   CALCIUM mg/dL 8.2* 8.4* 8.5*   < > 8.3* 8.5*   EGFR mL/min/1.73 51.2* 57.7* 53.8*   < > 51.6* 46.6*   BILIRUBIN mg/dL  --   --   --   --  0.3 0.3   ALK PHOS U/L  --   --   --   --  102 95   ALT (SGPT) U/L  --   --   --   --  9 9   AST (SGOT) U/L  --   --   --   --  16 15   GLUCOSE mg/dL 101* 108* 110*   < > 89 97    < > = values in this interval not displayed.       Radiology:   Imaging Results (Last 72 Hours)       ** No results found for the last 72 hours. **            Culture:  No results found for: \"BLOODCX\", \"URINECX\", \"WOUNDCX\", \"MRSACX\", \"RESPCX\", \"STOOLCX\"      Assessment   Acute kidney injury/ATN  Chronic kidney disease stage IIIb  Hypertension  Diabetes type 2  Diabetic foot ulcer with sepsis  Anemia chronic kidney disease  Obesity  Metabolic acidosis     Plan:  Discussed with patient, nursing  Workup reviewed today  Monitor labs  Weaned IV fluids  ID / podiatry evaluation reviewed as current  Antibiotics per ID-finished ertapenem on 8/7        Willy Arteaga MD  8/10/2024  17:50 CDT        Electronically signed by Willy Arteaga MD at 08/10/24 1078       Alejandrina Barnett DO at 08/10/24 1140              [facility] Medicine Services  INPATIENT PROGRESS NOTE    Patient Name: Erick Luong  Date of Admission: 7/31/2024  Today's Date: 08/10/24  Length of Stay: 10  Primary Care Physician: Del Shetty MD    Subjective   Chief Complaint: diabetic foot ulcer.   HPI   Complains of foot hurts when he is up on it.  Has not been ambulatory yet today.  Denies any new problems  Awaiting nursing home placement      Review of Systems   All pertinent negatives and positives are as above. All other systems have been " reviewed and are negative unless otherwise stated.     Objective    Temp:  [97.8 °F (36.6 °C)-98.4 °F (36.9 °C)] 98 °F (36.7 °C)  Heart Rate:  [71-82] 71  Resp:  [16] 16  BP: (113-155)/(55-93) 134/58  Physical Exam  Vitals and nursing note reviewed.   Constitutional:       Appearance: He is obese.   HENT:      Head: Normocephalic and atraumatic.      Right Ear: External ear normal.      Left Ear: External ear normal.      Nose: Nose normal.      Mouth/Throat:      Mouth: Mucous membranes are moist.   Eyes:      Extraocular Movements: Extraocular movements intact.      Conjunctiva/sclera: Conjunctivae normal.      Pupils: Pupils are equal, round, and reactive to light.   Cardiovascular:      Rate and Rhythm: Normal rate and regular rhythm.      Pulses: Normal pulses.      Heart sounds: Normal heart sounds.   Pulmonary:      Effort: Pulmonary effort is normal.   Abdominal:      General: Bowel sounds are normal.      Palpations: Abdomen is soft.   Musculoskeletal:      Cervical back: Normal range of motion.      Comments: Moves all extremities   Skin:     General: Skin is warm and dry.      Capillary Refill: Capillary refill takes less than 2 seconds.      Comments: Dressing left foot/heel intact.   Neurological:      Mental Status: He is alert and oriented to person, place, and time.   Psychiatric:      Comments: Affect  is flat.             File Link    Scan on 8/7/2024 1702 by Aicha Smallwood RN: Wound Left lateral foot Diabetic Ulcer        Key Information    Document ID File Type Document Type Description   [id number] Image WOUND IMAGE Wound Left lateral foot Diabetic Ulcer     Import Information    Attached At Date Time User Dept   Encounter Level 8/7/2024  5:02 PM Aicha Smallwood RN  [facility]     Encounter    Hospital Encounter on 7/31/24             Results Review:  I have reviewed the labs, radiology results, and diagnostic studies.    Laboratory Data:   Results from last 7 days   Lab Units  08/10/24  0626 08/09/24  0518 08/08/24 0449   WBC 10*3/mm3 7.02 7.27 8.08   HEMOGLOBIN g/dL 10.1* 9.9* 9.6*   HEMATOCRIT % 32.1* 31.6* 30.5*   PLATELETS 10*3/mm3 271 247 242        Results from last 7 days   Lab Units 08/10/24  0626 08/09/24 0518 08/08/24 0449 08/06/24  0508 08/05/24  0444 08/04/24  0615   SODIUM mmol/L 142 141 141   < > 144 145   POTASSIUM mmol/L 3.8 4.1 3.8   < > 3.9 3.9   CHLORIDE mmol/L 109* 110* 109*   < > 112* 111*   CO2 mmol/L 23.0 22.0 21.0*   < > 21.0* 21.0*   BUN mg/dL 16 15 15   < > 20 24*   CREATININE mg/dL 1.48* 1.34* 1.42*   < > 1.47* 1.60*   CALCIUM mg/dL 8.2* 8.4* 8.5*   < > 8.3* 8.5*   BILIRUBIN mg/dL  --   --   --   --  0.3 0.3   ALK PHOS U/L  --   --   --   --  102 95   ALT (SGPT) U/L  --   --   --   --  9 9   AST (SGOT) U/L  --   --   --   --  16 15   GLUCOSE mg/dL 101* 108* 110*   < > 89 97    < > = values in this interval not displayed.       Culture Data:   Blood Culture   Date Value Ref Range Status   07/31/2024 No growth at 5 days  Final   07/31/2024 Staphylococcus, coagulase negative (C)  Final     Urine Culture   Date Value Ref Range Status   07/31/2024 >100,000 CFU/mL Escherichia coli ESBL (A)  Final     Wound Culture   Date Value Ref Range Status   08/02/2024 (A)  Final    Light growth (2+) Streptococcus agalactiae (Group B)     Comment:       This organism is considered to be universally susceptible to penicillin.  No further antibiotic testing will be performed. If Clindamycin or Erythromycin is the drug of choice, notify the laboratory within 7 days to request susceptibility testing.   08/02/2024 (A)  Final    Light growth (2+) Streptococcus agalactiae (Group B)     Comment:       This organism is considered to be universally susceptible to penicillin.  No further antibiotic testing will be performed. If Clindamycin or Erythromycin is the drug of choice, notify the laboratory within 7 days to request susceptibility testing.       Radiology Data:   Imaging Results  (Last 24 Hours)       ** No results found for the last 24 hours. **            I have reviewed the patient's current medications.     Assessment/Plan   Assessment  Active Hospital Problems    Diagnosis     **DKA, type 2, not at goal     E. coli UTI, ESBL     Atrial fibrillation     Chronic heart failure with preserved ejection fraction (HFpEF)     Chronic diastolic heart failure     GERD without esophagitis     Diabetic ulcer of left foot associated with type 2 diabetes mellitus        Treatment Plan  Continue current treatment of foot wound.  Continue current diabetic management.  Currently he is very well-controlled with medicine and diet.  Continue to await nursing home placement.  Currently waiting on pre-CERT.       Medical Decision Making  Number and Complexity of problems:   3 acute, high complexity problems  4+ chronic, moderate complexity problems     Differential Diagnosis: None     Conditions and Status        Condition is improving.     [facility] Data  External documents reviewed: Care Everywhere  Cardiac tracing (EKG, telemetry) interpretation: See HPI  Radiology interpretation: See HPI  Labs reviewed: See HPI  Any tests that were considered but not ordered: None     Decision rules/scores evaluated (example [id number], Wells, etc): None     Discussed with: The patient     Care Planning  Shared decision making: Patient  Code status and discussions: Full code     Disposition  Social Determinants of Health that impact treatment or disposition: none     I expect the patient to be discharged to SNF  in 1-2 days.    Electronically signed by Alejandrina Barnett DO, 08/10/24, 11:47 CDT.      Electronically signed by Alejandrina Barnett DO at 08/10/24 1149       Consult Notes (last 48 hours)  Notes from 08/10/24 0937 through 08/12/24 0937   No notes of this type exist for this encounter.

## 2024-08-12 NOTE — PLAN OF CARE
Goal Outcome Evaluation:  Plan of Care Reviewed With: patient        Progress: no change  Outcome Evaluation: Pt c/o pain to left foot, prn po med given when available. Accucheck, SSI given. Lovenox. Voiding via urinal. Awaiting placement. Wound care to left foot done per order. Precautions maintained. Safety maintained.

## 2024-08-12 NOTE — DISCHARGE SUMMARY
[facility] Medicine Services  DISCHARGE SUMMARY       Date of Admission: 7/31/2024  Date of Discharge:  8/12/2024  Primary Care Physician: Del Shetty MD    Discharge Diagnoses:  Active Hospital Problems    Diagnosis     **DKA, type 2, not at goal     E. coli UTI, ESBL     Atrial fibrillation     Chronic heart failure with preserved ejection fraction (HFpEF)     Chronic diastolic heart failure     GERD without esophagitis     Diabetic ulcer of left foot associated with type 2 diabetes mellitus          Presenting Problem/History of Present Illness:  DKA, type 2, not at goal [E11.10]     Chief Complaint on Day of Discharge:   Diabetic foot wounds, generalized weakness and difficulty ambulating    History of Present Illness on Day of Discharge:   The patient is at baseline.  He continues to receive care for diabetic foot wounds.  He will require rehab after discharge and is appropriate for discharge today to skilled nursing facility.    Hospital Course  Mr. Luong is a 69-year-old male who presented to [facility] ER via EMS for altered mental status.  It was reported to ER physician by EMS that the patient was was found by his son confused and wandering around in the garage.  Unfortunately family was not available for additional information.   HPI obtained from ER physician, Dr. Dee, who advises that patient presented soiled, confused and unable to provided events leading to his reason for EMS transport.      ER course workup significant for: CBC with elevated WBC of 15.4, low hemoglobin 9.8 and hematocrit 29.6, elevated ESR 57, elevated CRP of 17.  CMP with low sodium 133, elevated BUN of 54 and elevated creatinine 2.64, elevated glucose 704, urine ketones and small amount of acetone. Elevated lactate 2.4 with delta 2.2.  Elevated HS troponin T 342 delta 353.   Urinalysis: Trace blood, positive nitrite, negative leukocytes, +2 bacteria.   CXR: Low lung volumes and vascular  crowding.  X-ray of left foot: Ulcer on the sole of the foot posteriorly near the calcaneus.  There is questionable small area of bony erosion along the plantar surface of the calcaneus just proximal to the plantar calcaneal spur.  Osteomyelitis cannot be ruled out.   EKG: A-fib with rapid ventricular response with rate of 128 bpm.      The ICU team was asked to evaluate the patient for AMS, AFIB-RVR, DKA, and open wound to left foot concerning for possible osteomyelitis.      I have seen and evaluated patient upon his arrival to CCU 4 where he appears in no acute distress, breathing is equal and non-labored.  He is slow to provided answers and is intermittently confused. He is able to tell me his name, , and the year after many tries, he is unaware to reason for hospitalization or how he got here.   He currently has insulin, and Cardizem drip infusing. Reports generalized discomfort, worse throughout the abdomen with associated nausea and reports vomiting early today and being sick the last couple days, does not give further details. Abdomen is distended and soft, grossly tender. Denies diarrhea or bloody/dark stool. Reports chest discomfort and shortness of breath. Open stage 2-3 decubitus ulcer to the left heel with associated soft tissue swelling, redness and weeping.      Assessment/Plan   This is a 69-year-old male who presented to [facility] ER via EMS for altered mental status.  PMHx DM type II, atrial fibrillation, CAD, chronic poor healing diabetic left foot ulcer with osteomyelitis, diabetic polyneuropathy, hypertension, hyperlipidemia insomnia, CKD stage IV, BRITTNI-CPAP, cervical spine stenosis, and vitamin D deficiency with numerous hospitalizations this year. PT was found to be confused in  AFIB-RVR, DKA, and open wound to left heal concerning acute infection with osteomyelitis. Critical care team asked to further manage acute illness.      Patient will be admitted under intensivist MD, Dr. Jamison, case will  be discussed in the AM.  Further recommendations and/or modifications to the treatment plan are ultimately at his discretion.     Treatment Plan     AFIB-RVR  -chronic history of atrial fibrillation. On review of PMHx records he was prescribed and taking Eliquis, which has been held due to GI bleeding after which he was suppose to f/u with cardiology about restarting. Epic shows cancelled f/u visits with cardiology, medication is currently not on patients med list.  -Lovenox AFIB coverage ordered in ED.  -Cardizem drip started in ED, rate controlled currently 80-90 bpm.   -likely rapid ventricular response 2/2 acute illness and probable underlying infection.  -Plan to DC Cardizem and transition back to his home dose BB  -elevated troponin in  delta 353, likely type II MI, cardiology consulted in ER, we appreciate their continued input.     ?Diabetic ketoacidosis  -History DM type 2 insulin-dependent  -small amount of acetone, ketones in urine  -do not have beta hydroxybutyrate at this facility  -BS at arrival 704  -normal anion gap  -normal pH 7.41  -Insulin drip started in ER will discontinue and transition to SSI  -likely HHS in the setting of acute illness     Urinary Tract Infection  -urinalysis shows nitrate positive, and +2 bacteria  -Hx of ESBL 6/30/24  -Urine culture pending  -Started on Zosyn/Vanco in the ER will continue  -ID consult     Diabetic foot ulcer  -chronic open wound, DC ulcer stage 2-3 to left heal with larger area soft pale tissue with surrounding edema/erythema and weeping at the outer aspect of the foot along the area of the 5th metatarsal.   -Hx of osteomyelitis and related MRSA bacteremia   -Zosyn/Vanco started in ED will continue   -podiatry consult  -wound care consult     Altered mental status  -likely related to acute illness/metabolic encephalopathy.  -cannot r/u stroke. No CT head in ER. No focal neuro deficits  -CT head ordered-results pending      Abdominal pain  -reported  nausea/vomiting. Abdominal distention and tender on exam  -Hx of pancreatitis  -previous appendectomy  -Hx of GI bleeding, no active bleeding noted  -No CT abd/pel in ER  -CT abd/pel with contrast ordered-results pending     CKD-IV  -bun/creatinine at baseline  -monitor lytes and urine output   -avoid neph toxic medications    Cardiology, nephrology and infectious diseases on consult at the time of admission.  Consultation recommendations are noted in the consult section below.  The patient was continued on vancomycin given his history of MRSA infection in the left foot.  Zosyn was discontinued.  Podiatry was consulted.  Cardiology saw the patient noting that the patient was in atrial fibrillation and elevated heart rate with elevated troponins secondary to diabetic ketoacidosis and his host of other medical issues present on arrival.  No further testing or therapeutic changes are recommended regarding atrial fibrillation.  Nephrology recommended continuing IV fluids and close monitoring of vancomycin level and labs as well as urine electrolytes.  Podiatry recommended deep wound culture during debridement of left foot wounds and topical wound care.  PT and OT were consulted.  Both services recommended SNF placement at discharge.  Renal function returned to baseline as of 8//23.  The patient remained in sinus rhythm and continued to receive wound care throughout his hospital stay.  Infectious diseases started ertapenem on 8/4 to cover both ESBL in urine as well as group B streptococcus.  He remained on ertapenem for 5-day course.  LTAC referral was placed and the patient was denied admission.  He has agreed to SNF placement and SNF referrals of those were made.  It is noted that the patient was unable to take care of his own ones effectively at home.  He remained on local wound care for the remainder of his hospital stay.  [facility] is the only accepting facility and admissions begin working on precertification on  "8/9/2024.  The patient was placed on Betadine wet-to-dry dressings to be performed twice daily and he remained on that regimen throughout his hospital stay with improvement in the patient's wound bed.  He has been accepted to [facility] today in transfer for continued wound care and rehabilitation.      Consults:   Cardiology:  ASSESSMENT/PLAN:     1.  Altered mental status due to problem #2, most likely  2.  Diabetic ketoacidosis  3.  Urinary tract infection  4.  Diabetic foot ulcer with radiographic concern for osteomyelitis  5.  Atrial fibrillation with rapid ventricular response, present on arrival  6.  Elevated troponin: No ischemic symptoms, therefore considered to represent myocardial injury in the setting of the above mentioned medical problems  7.  Abdominal pain: Essentially chronic for this patient.  CT scan findings noted above  8.  Stage IV chronic kidney disease     -We are asked to see this patient with atrial fibrillation and elevated heart rate along with elevated troponin: The patient had elevated heart rate upon arrival which is not surprising given his host of medical issues present on arrival including all of the above-mentioned problems.  His heart rate is now improved and the patient is otherwise asymptomatic.  Therefore, I would not recommend any further testing or changes in therapy regarding atrial fibrillation.  Regarding stroke risk reduction, he has not historically been anticoagulated as he is thought to be a very poor candidate for anticoagulation given his host of medical issues including multiple hospital admissions, infections, renal insufficiency, bouts of altered mental status, etc.  Therefore, I would not recommend therapeutic anticoagulation at this time for the patient.  -In regards to elevated troponin: The patient frankly denies chest pain.  I am not certain why this was checked by the emergency department provider other than simply following a \"protocol\" to check troponin " levels on patient to come to the emergency department.  Without any suspicion of myocardial ischemia, there is simply not a good reason to check a troponin level in this particular patient who has simply had an elevated troponin level at almost every visit to the hospital but more importantly, in this particular case, with worsening renal function on arrival, diabetic ketoacidosis, atrial fibrillation with elevated heart rate, it is not surprising to see a troponin elevation significantly higher than what was previously appreciated.  At this time, no ischemic testing will be offered as the patient simply does not have any ischemic symptoms and I do not feel that this would be beneficial at this time.  -No further cardiac needs at the present time therefore we will be available as needed.  Please feel free to call if there are further questions.      Infectious diseases:  HOSPITAL PROBLEM LIST:       DKA, type 2, not at goal        IMPRESSION:  Confusion on presentation-mental status back to baseline.  Patient with glucose in the 700s on admission.  Likely contributing to his altered mental status.  Suspect infection involving left foot and certainly has portal of entry for bacteria.  Diabetic foot infection with history of osteomyelitis persistent open wound on heel and concerning area of nonviable skin laterally (no rios necrosis) associated with cellulitis.  I had not seen the x-rays of the foot when I examined the patient so did not specifically look at the areas of concern for foreign body  Uncontrolled diabetes mellitus with hemoglobin A1c greater than 12  Complains of abdominal pain-patient has had that off and on previously and has been seen by GI during his admissions.  There is documented colonoscopy from July 2 however patient had inadequate prep.  Leukocytosis-improved  Chronic kidney disease stage IIIb with acute kidney injury        RECOMMENDATION:   Continue vancomycin  Continue zosyn  Follow-up blood  cultures  Reevaluate left foot more closely given concerns for foreign body versus artifact on x-rays  Await wound care evaluation-need to monitor lateral ankle closely.  Heel wound appears to be chronic and fairly clean  Diabetes management per intensivist     Reviewed emergency department notes  Reviewed admission history and physical     Julia Adrian MD    Nephrology:  Assessment   1.  Acute kidney injury/worsening.  2.  Acute tubular necrosis.  3.  Stage IIIb chronic kidney disease baseline.  4.  Type II diabetic nephropathy.  5.  Sepsis related to diabetic foot ulcer.  6.  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  Morbid abdominal obesity.  8.  Anemia of chronic kidney disease.  9.  Poorly controlled type 2 diabetes.     Plan:  1.  Continue IV fluid.  2.  Urinary electrolytes/UACR.  3.  Close monitoring of Vanco level  4.  Continue to monitor renal function.  5.  Baseline iron studies, may need RYANNE.  Also get serum PTH level.  6.  Plan was discussed with the patient and critical care nurse     Thank you for the consult, we appreciate the opportunity to provide care to your patients.  Feel free to contact me if I can be of any further assistance.       Brian Lomas MD    Podiatry:  ASSESSMENT/PLAN   Diabetic foot ulcerations to left foot  Type 2 diabetes mellitus with hyperglycemia  Diabetic polyneuropathy     Review of labs, imaging, and other provider documentation  Comprehensive lower extremity examination and evaluation was performed.     Linear foreign bodies projected over the soft tissues of the second  toe and first toe were noted upon xray imaging (artifacts were also included in the differential). No evidence of soft tissue damage, FB entry, or infection noted to either great or second toes today.      Deep wound culture obtained today during debridement of wounds to left foot.     Orders for wound care placed-  Betadine wet-to-dry dressing to be performed twice daily per nursing.     From  "Podiatry standpoint, no surgical interventions are planned at this time.     Overall recommendation would be to send patient to a skilled nursing facility for ongoing wound care upon discharge.     Thank you kindly for the consult, will continue to follow patient while in house.        This document has been electronically signed by SUSAN Luna on August 2, 2024 16:07 CDT                         Cosigned by: Asad Cerrato DPM         Result Review    Result Review:  I have personally reviewed the results from the time of this admission to 8/12/2024 13:34 CDT and agree with these findings:  []  Laboratory  []  Microbiology  []  Radiology  []  EKG/Telemetry   []  Cardiology/Vascular   []  Pathology  []  Old records  []  Other:    Condition on Discharge:    Stable and improved    Physical Exam on Discharge:  /85 (BP Location: Right arm, Patient Position: Sitting)   Pulse 80   Temp 98.3 °F (36.8 °C) (Oral)   Resp 18   Ht 182.9 cm (72\")   Wt (!) 142 kg (312 lb 3.2 oz)   SpO2 95%   BMI 42.34 kg/m²   Physical Exam       Constitutional:       Appearance: He is obese.   HENT:      Head: Normocephalic and atraumatic.      Right Ear: External ear normal.      Left Ear: External ear normal.      Nose: Nose normal.      Mouth/Throat:      Mouth: Mucous membranes are moist.   Eyes:      Extraocular Movements: Extraocular movements intact.      Conjunctiva/sclera: Conjunctivae normal.      Pupils: Pupils are equal, round, and reactive to light.   Cardiovascular:      Rate and Rhythm: Normal rate and regular rhythm.      Pulses: Normal pulses.      Heart sounds: Normal heart sounds.   Pulmonary:      Effort: Pulmonary effort is normal.   Abdominal:      General: Bowel sounds are normal.      Palpations: Abdomen is soft.   Musculoskeletal:      Cervical back: Normal range of motion.      Comments: Moves all extremities   Skin:     General: Skin is warm and dry.      Capillary Refill: Capillary refill " takes less than 2 seconds.      Comments: Dressing left foot/heel intact.   Neurological:      Mental Status: He is alert and oriented to person, place, and time.   Psychiatric:      Comments: Affect  is flat.     Discharge Disposition:  Skilled Nursing Facility (DC - External)    Discharge Medications:     Discharge Medications        New Medications        Instructions Start Date   apixaban 5 MG tablet tablet  Commonly known as: ELIQUIS   5 mg, Oral, 2 Times Daily      oxyCODONE-acetaminophen 5-325 MG per tablet  Commonly known as: PERCOCET   1 tablet, Oral, Every 6 Hours PRN      sodium bicarbonate 650 MG tablet   650 mg, Oral, 3 Times Daily             Changes to Medications        Instructions Start Date   pregabalin 100 MG capsule  Commonly known as: LYRICA  What changed:   how much to take  when to take this   100 mg, Oral, Nightly      pregabalin 50 MG capsule  Commonly known as: LYRICA  What changed:   medication strength  how much to take   50 mg, Oral, Daily   Start Date: August 13, 2024            Continue These Medications        Instructions Start Date   ascorbic acid 1000 MG tablet  Commonly known as: VITAMIN C   1,000 mg, Oral, Daily      donepezil 10 MG tablet  Commonly known as: ARICEPT   10 mg, Oral, Daily      DULoxetine 60 MG capsule  Commonly known as: CYMBALTA   60 mg, Oral, Daily      famotidine 20 MG tablet  Commonly known as: PEPCID   Take 1 tablet twice a day by oral route.      Insulin Regular Human (Conc) 500 UNIT/ML solution pen-injector CONCENTRATED injection  Commonly known as: HumuLIN R   40 Units, Subcutaneous, 2 Times Daily Before Meals, Inject 40 units under the skin in the the appropriate area with regular meals AND 40 units with large meals.       Lantus SoloStar 100 UNIT/ML injection pen  Generic drug: Insulin Glargine   20 Units, Subcutaneous, Every Night at Bedtime      melatonin 3 MG tablet   6 mg, Oral, Nightly PRN      nitroglycerin 0.4 MG SL tablet  Commonly known as:  NITROSTAT   0.4 mg, Sublingual, Every 5 Minutes PRN, Take no more than 3 doses in 15 minutes.      pantoprazole 40 MG EC tablet  Commonly known as: PROTONIX   40 mg, Oral, Every 12 Hours Scheduled      polyethylene glycol 17 GM/SCOOP powder  Commonly known as: MIRALAX   17 g, Oral, Daily PRN      rosuvastatin 10 MG tablet  Commonly known as: CRESTOR   10 mg, Oral, Nightly      sennosides-docusate 8.6-50 MG per tablet  Commonly known as: PERICOLACE   1 tablet, Oral, Nightly, Obtain OTC             Stop These Medications      bumetanide 1 MG tablet  Commonly known as: BUMEX     busPIRone 10 MG tablet  Commonly known as: BUSPAR     calcitriol 0.5 MCG capsule  Commonly known as: ROCALTROL     carvedilol 3.125 MG tablet  Commonly known as: COREG     Diclofenac Sodium 1 % gel gel  Commonly known as: VOLTAREN     levocetirizine 5 MG tablet  Commonly known as: XYZAL     oxyCODONE 10 MG tablet  Commonly known as: ROXICODONE     sucralfate 1 g tablet  Commonly known as: CARAFATE     Vitron-C  MG tablet  Generic drug: Iron-Vitamin C              Discharge Diet:   Diet Instructions       Diet: Diabetic Diets; Consistent Carbohydrate; Thin (IDDSI 0)      Discharge Diet: Diabetic Diets    Diabetic Diet: Consistent Carbohydrate    Fluid Consistency: Thin (IDDSI 0)            Discharge Care Plan / Instructions:   Discharge to skilled nursing facility    Activity at Discharge:   Activity Instructions       Activity as Tolerated              Follow-up Appointments:  Follow-up with wound care next week    Electronically signed by Payam Keyes DO, 08/12/24, 13:34 CDT.    Time: Discharge over 30 min    Part of this note may be an electronic transcription/translation of spoken language to printed text using the Dragon Dictation system.

## 2024-08-12 NOTE — PROGRESS NOTES
Nephrology ([facility]) Progress Note      Patient:  Erick Luong  YOB: 1955  Date of Service: 8/11/2024  MRN: [id number]   Acct: [id number]   Primary Care Physician: Del Shetty MD  Advance Directive:   Code Status and Medical Interventions: CPR (Attempt to Resuscitate); Full Support   Ordered at: 08/01/24 0053     Level Of Support Discussed With:    Patient     Code Status (Patient has no pulse and is not breathing):    CPR (Attempt to Resuscitate)     Medical Interventions (Patient has pulse or is breathing):    Full Support     Admit Date: 7/31/2024       Hospital Day: 11  Referring Provider: Abebe Garzon DO      Patient personally seen and examined.  Complete chart including Consults, Notes, Operative Reports, Labs, Cardiology, and Radiology studies reviewed as able.        Subjective:  Erick Luong is a 69 y.o. male for whom we were consulted for evaluation and treatment of acute kidney injury. Baseline chronic kidney disease stage 3b. Baseline creatinine approximately 1.5-1.8. Follows in our office.  History of poorly controlled type 2 diabetes, hypertension, coronary artery disease, diabetic foot ulcer, obesity.  On 7/31, patient was found wandering at home confused.  He was brought to ER for evaluation. He has a nonhealing left foot diabetic ulcer with serosanguineous drainage. Blood glucose level was >700 with A1c >12%. Initial creatinine was 2.67 and has steadily improved since he was admitted. Nephrology consulted on 8/1. Hospital course remarkable for improving renal function and improved blood glucose levels. Moved to medical floor after stabilization.     Today, no overnight events.  Hopeful for discharge soon.  Denied current chest pain, shortness of air at rest, nausea or vomiting.  Up in bed without any complaint with legs down.       Allergies:  Cefepime, Bactrim [sulfamethoxazole-trimethoprim], Vancomycin, Zolpidem, and Metronidazole    Home  Meds:  Medications Prior to Admission   Medication Sig Dispense Refill Last Dose    ascorbic acid (VITAMIN C) 1000 MG tablet Take 1 tablet by mouth Daily. 30 tablet 3     bumetanide (BUMEX) 1 MG tablet Take 1 tablet by mouth 2 (Two) Times a Day for 30 days. 60 tablet 0     busPIRone (BUSPAR) 10 MG tablet Take 1 tablet by mouth 2 (Two) Times a Day.       calcitriol (ROCALTROL) 0.5 MCG capsule Take 1 capsule by mouth Daily. 90 capsule 4     carvedilol (COREG) 3.125 MG tablet Take 1 tablet twice a day by oral route. 60 tablet 4     donepezil (ARICEPT) 10 MG tablet Take 1 tablet by mouth Daily. 90 tablet 1     DULoxetine (CYMBALTA) 60 MG capsule Take 1 capsule by mouth Daily. 90 capsule 4     famotidine (PEPCID) 20 MG tablet Take 1 tablet twice a day by oral route. 180 tablet 4     Insulin Glargine (Lantus SoloStar) 100 UNIT/ML injection pen Inject 20 Units under the skin into the appropriate area as directed every night at bedtime. 15 mL 3     Insulin Regular Human, Conc, (HumuLIN R) 500 UNIT/ML solution pen-injector CONCENTRATED injection Inject 40 Units under the skin into the appropriate area as directed 2 (Two) Times a Day Before Meals. Inject 40 units under the skin in the the appropriate area with regular meals AND 40 units with large meals.       Iron-Vitamin C (Vitron-C)  MG tablet Take 1 tablet twice a day by oral route. 60 tablet 2     levocetirizine (XYZAL) 5 MG tablet Take 1 tablet by mouth every day at bedtime. 30 tablet 2     melatonin 3 MG tablet Take 2 tablets by mouth At Night As Needed for Sleep. 30 tablet 0     oxyCODONE (ROXICODONE) 10 MG tablet Take 1 tablet by mouth 2 (Two) Times a Day As Needed. Must last 30 days per md. 55 tablet 0     pantoprazole (PROTONIX) 40 MG EC tablet Take 1 tablet by mouth Every 12 (Twelve) Hours. 180 tablet 4     pregabalin (LYRICA) 100 MG capsule Take 1 capsule by mouth Daily.       pregabalin (LYRICA) 100 MG capsule Take 2 capsules by mouth every night at  bedtime.       rosuvastatin (CRESTOR) 10 MG tablet Take 1 tablet by mouth Every Night. 30 tablet 2     sucralfate (CARAFATE) 1 g tablet Take 1 tablet by mouth 4 (Four) Times a Day before meals. 360 tablet 1     Diclofenac Sodium (VOLTAREN) 1 % gel gel Apply 2 g topically to the appropriate area as directed 4 (Four) Times a Day As Needed. 300 g 11     nitroglycerin (NITROSTAT) 0.4 MG SL tablet Place 1 tablet under the tongue Every 5 (Five) Minutes As Needed for Chest Pain. Take no more than 3 doses in 15 minutes.       polyethylene glycol (MIRALAX) 17 GM/SCOOP powder Take 17 g by mouth Daily As Needed (constipation).       sennosides-docusate (PERICOLACE) 8.6-50 MG per tablet Take 1 tablet by mouth Every Night. Obtain OTC          Medicines:  Current Facility-Administered Medications   Medication Dose Route Frequency Provider Last Rate Last Admin    ascorbic acid (VITAMIN C) tablet 1,000 mg  1,000 mg Oral Daily Reuben Garza APRN   1,000 mg at 08/11/24 0828    sennosides-docusate (PERICOLACE) 8.6-50 MG per tablet 2 tablet  2 tablet Oral BID Lamine Figueroa APRN   2 tablet at 08/09/24 0903    And    polyethylene glycol (MIRALAX) packet 17 g  17 g Oral Daily PRN Lamine Figueroa APRN        And    bisacodyl (DULCOLAX) EC tablet 5 mg  5 mg Oral Daily PRN Lamine Figueroa APRN        And    bisacodyl (DULCOLAX) suppository 10 mg  10 mg Rectal Daily PRN Lamine Figueroa APRN   10 mg at 08/02/24 0941    Calcium Replacement - Follow Nurse / BPA Driven Protocol   Does not apply PRN Abebe Garzon DO        dextrose (D50W) (25 g/50 mL) IV injection 10-50 mL  10-50 mL Intravenous Q15 Min PRN Abebe Garzon DO        dextrose (D50W) (25 g/50 mL) IV injection 25 g  25 g Intravenous Q15 Min PRN Lamine Figueroa APRN        dextrose (GLUTOSE) oral gel 15 g  15 g Oral Q15 Min PRN Lamine Figueroa APRN        donepezil (ARICEPT) tablet 10 mg  10 mg Oral Daily Reuben Garza APRN   10 mg at 08/11/24 0862     DULoxetine (CYMBALTA) DR capsule 60 mg  60 mg Oral Daily Reuben Garza APRN   60 mg at 08/11/24 0828    Enoxaparin Sodium (LOVENOX) syringe 135 mg  1 mg/kg Subcutaneous Q12H Abebe Garzon DO   135 mg at 08/11/24 0831    famotidine (PEPCID) tablet 20 mg  20 mg Oral Q12H Reuben Garza APRN   20 mg at 08/11/24 0829    glucagon (GLUCAGEN) injection 1 mg  1 mg Intramuscular Q15 Min PRN Lamine Figueroa APRN        insulin glargine (LANTUS, SEMGLEE) injection 10 Units  10 Units Subcutaneous Daily Keren Jamison MD   10 Units at 08/11/24 0827    insulin regular (humuLIN R,novoLIN R) injection 3-14 Units  3-14 Units Subcutaneous 4x Daily AC & at Bedtime Keren Jamison MD   3 Units at 08/11/24 1730    ipratropium-albuterol (DUO-NEB) nebulizer solution 3 mL  3 mL Nebulization Q4H PRN Alejandrina Barnett DO   3 mL at 08/11/24 1906    Magnesium Standard Dose Replacement - Follow Nurse / BPA Driven Protocol   Does not apply PRN Abebe Garzon DO        melatonin tablet 2.5 mg  2.5 mg Oral Nightly Reuben Garza APRN   2.5 mg at 08/10/24 2058    nitroglycerin (NITROSTAT) SL tablet 0.4 mg  0.4 mg Sublingual Q5 Min PRN Lamine Figueroa APRN        ondansetron (ZOFRAN) injection 4 mg  4 mg Intravenous Q6H PRN Payam Keyes DO   4 mg at 08/08/24 0900    oxyCODONE-acetaminophen (PERCOCET) 5-325 MG per tablet 1 tablet  1 tablet Oral Q6H PRN Garrett Alicia MD   1 tablet at 08/11/24 0108    pantoprazole (PROTONIX) EC tablet 40 mg  40 mg Oral Q12H Reuben Garza APRN   40 mg at 08/11/24 0829    Pharmacy to Dose enoxaparin (LOVENOX)   Does not apply Continuous PRN Abebe Garzon DO        Phosphorus Replacement - Follow Nurse / BPA Driven Protocol   Does not apply PRN Abebe Garzon DO        polyethylene glycol (MIRALAX) packet 17 g  17 g Oral Daily Reuben Garza APRN   17 g at 08/09/24 0904    Potassium Replacement - Follow Nurse / BPA Driven Protocol   Does not apply PRN  Abebe Garzon DO        pregabalin (LYRICA) capsule 100 mg  100 mg Oral Nightly Reuben Garza APRN   100 mg at 08/10/24 2058    pregabalin (LYRICA) capsule 50 mg  50 mg Oral Daily Reuben Garza APRN   50 mg at 08/11/24 0828    rosuvastatin (CRESTOR) tablet 10 mg  10 mg Oral Nightly Reuben Garza APRN   10 mg at 08/10/24 2058    sodium bicarbonate tablet 650 mg  650 mg Oral TID Brian Lomas MD   650 mg at 08/11/24 1730    sodium chloride 0.9 % flush 10 mL  10 mL Intravenous Q12H Abebe Garzon DO   10 mL at 08/08/24 2023    sodium chloride 0.9 % flush 10 mL  10 mL Intravenous PRN Abebe Garzon DO        sodium chloride 0.9 % flush 10 mL  10 mL Intravenous Q12H Lamine Figueroa APRN   10 mL at 08/11/24 0831    sodium chloride 0.9 % flush 10 mL  10 mL Intravenous PRN Lamine Figueroa APRN        sodium chloride 0.9 % infusion 40 mL  40 mL Intravenous PRN Abebe Garzon DO        sodium chloride 0.9 % infusion 40 mL  40 mL Intravenous PRN Lamine Figueroa APRN           Past Medical History:  Past Medical History:   Diagnosis Date    Arthritis     Autonomic disease     CAD (coronary artery disease) 02/06/2017    Cervical radiculopathy 09/16/2021    Chronic constipation with acute exaccerbation 05/10/2021    Coronary artery disease     Degeneration of cervical intervertebral disc 08/11/2021    Diabetes mellitus     Diabetic foot ulcer 08/31/2020    Diabetic polyneuropathy associated with type 2 diabetes mellitus 01/18/2021    Elevated cholesterol     Gastroesophageal reflux disease 05/13/2019    Headache     HTN (hypertension), benign 05/03/2017    Hyperlipidemia     Hypertension     Mixed hyperlipidemia 02/07/2017    Multiple lung nodules 01/26/2020    5mm, 9 mm RLL identified 1/2020, not present 10/2019.    Myocardial infarction     Osteomyelitis 01/22/2020    Osteomyelitis of fifth toe of right foot 10/07/2019    Pancreatitis     Persistent insomnia 01/20/2020    Renal disorder      Sleep apnea 02/06/2017    Sleep apnea with use of continuous positive airway pressure (CPAP)     NON-COMPLIANT    Slow transit constipation 01/16/2019    Spinal stenosis in cervical region 09/16/2021    Vitamin D deficiency 03/02/2021       Past Surgical History:  Past Surgical History:   Procedure Laterality Date    ABDOMINAL SURGERY      AMPUTATION FOOT / TOE Left 10/2021    5th digit     ANTERIOR CERVICAL DISCECTOMY W/ FUSION N/A 08/05/2022    Procedure: CERVICAL DISCECTOMY ANTERIOR WITH FUSION C5-6 with possible plating of C5-7 with neuromonitoring and 1 c-arm;  Surgeon: Karel Soliz MD;  Location: [facility] OR;  Service: Neurosurgery;  Laterality: N/A;    APPENDECTOMY      BACK SURGERY      CARDIAC CATHETERIZATION Left 02/08/2021    Procedure: Left Heart Cath w poss intervention left anatomical snuff box acess;  Surgeon: Omkar Charles DO;  Location: [facility] CATH INVASIVE LOCATION;  Service: Cardiology;  Laterality: Left;    CARDIAC SURGERY      CATARACT EXTRACTION      CERVICAL SPINE SURGERY      COLONOSCOPY N/A 01/31/2017    Normal exam repeat in 5 years    COLONOSCOPY N/A 02/11/2019    Mild acute inflammation    COLONOSCOPY N/A 04/07/2024    2 areas at 10 and 20 cm with friability ulceration 2 clips placed at 20 cm and 4 clips at 10 cm poor prep normal mucosa,mild eroisions and ulcerations in visible vessels    COLONOSCOPY N/A 7/1/2024    Procedure: COLONOSCOPY WITH ANESTHESIA;  Surgeon: Arsalan Lorenzo DO;  Location: [facility] ENDOSCOPY;  Service: Gastroenterology;  Laterality: N/A;  pre op constipation/diarrhea  post poor prep  pcp Del Shetty    COLONOSCOPY N/A 7/2/2024    Procedure: COLONOSCOPY WITH ANESTHESIA;  Surgeon: Agapito Christopher MD;  Location: [facility] ENDOSCOPY;  Service: Gastroenterology;  Laterality: N/A;  pre rectal bleeding  post poor prep  pcp Del Shetty MD    COLONOSCOPY W/ POLYPECTOMY  03/04/2014    Hyperplastic polyp    CORONARY ARTERY BYPASS GRAFT  10/2015     ENDOSCOPY  2011    Gastritis with hemorrhage    ENDOSCOPY N/A 2017    Normal exam    ENDOSCOPY N/A 2019    Gastritis    ENDOSCOPY N/A 2020    Non-erosive gastritis with hemorrhage    ENDOSCOPY N/A 02/10/2021    Esophagitis    ENDOSCOPY N/A 2024    There were esophageal mucosal changes suspicious for short-segment Low's esophagus present in the distal esophagus. The maximum longitudinal extent of these mucosal changes was 2 cm in length. Mucosa was biopsied with a cold forceps for histologyDistal esophagus, biopsies: Mild chronic active esophagogastritis. No evidence of intestinal metaplasia, dysplasia. Antrum, bx, Mild chronic gastritis    FOOT SURGERY Left     INCISION AND DRAINAGE OF WOUND Left 2007    spider bite       Family History  Family History   Problem Relation Age of Onset    Colon cancer Father     Heart disease Father     Colon cancer Sister     Colon polyps Sister     Alzheimer's disease Mother     Coronary artery disease Sister     Coronary artery disease Sister        Social History  Social History     Socioeconomic History    Marital status:    Tobacco Use    Smoking status: Former     Current packs/day: 0.00     Types: Cigarettes     Quit date:      Years since quittin.6    Smokeless tobacco: Never    Tobacco comments:     smoked in [employer]   Vaping Use    Vaping status: Never Used   Substance and Sexual Activity    Alcohol use: No    Drug use: No    Sexual activity: Defer       Review of Systems:  History obtained from chart review and the patient  General ROS: No fever or chills  Respiratory ROS: No cough, shortness of breath, wheezing  Cardiovascular ROS: No chest pain or palpitations  Gastrointestinal ROS: No abdominal pain or melena  Genito-Urinary ROS: No dysuria or hematuria  Psych ROS: No anxiety and depression  14 point ROS reviewed with the patient and negative except as noted above and in the HPI unless unable to  obtain.    Objective:  Patient Vitals for the past 24 hrs:   BP Temp Temp src Pulse Resp SpO2 Weight   08/11/24 1919 154/73 97.8 °F (36.6 °C) Oral 88 16 97 % --   08/11/24 1906 -- -- -- 84 16 95 % --   08/11/24 1849 145/76 -- -- 83 -- 100 % --   08/11/24 1545 136/72 97.6 °F (36.4 °C) Oral 76 16 95 % --   08/11/24 1100 157/88 97.7 °F (36.5 °C) Oral 83 16 100 % --   08/11/24 0755 146/61 98.2 °F (36.8 °C) Oral 66 16 94 % --   08/11/24 0423 135/58 97.7 °F (36.5 °C) Oral 70 16 94 % (!) 142 kg (312 lb 3.2 oz)   08/11/24 0025 149/81 98 °F (36.7 °C) Oral 70 16 91 % --   08/10/24 1946 143/75 98 °F (36.7 °C) Oral 89 16 99 % --       Intake/Output Summary (Last 24 hours) at 8/11/2024 1928  Last data filed at 8/11/2024 1919  Gross per 24 hour   Intake 480 ml   Output 775 ml   Net -295 ml     General: awake/alert   Chest:  clear to auscultation bilaterally without respiratory distress  CVS: regular rate and rhythm  Abdominal: soft, nontender, positive bowel sounds, obese  Extremities: tr ble edema  Skin: warm and dry without rash with wounds as documented      Labs:  Results from last 7 days   Lab Units 08/11/24  0537 08/10/24  0626 08/09/24  0518   WBC 10*3/mm3 6.60 7.02 7.27   HEMOGLOBIN g/dL 10.1* 10.1* 9.9*   HEMATOCRIT % 31.3* 32.1* 31.6*   PLATELETS 10*3/mm3 248 271 247         Results from last 7 days   Lab Units 08/11/24  0537 08/10/24  0626 08/09/24  0518 08/06/24  0508 08/05/24  0444   SODIUM mmol/L 140 142 141   < > 144   POTASSIUM mmol/L 4.0 3.8 4.1   < > 3.9   CHLORIDE mmol/L 110* 109* 110*   < > 112*   CO2 mmol/L 22.0 23.0 22.0   < > 21.0*   BUN mg/dL 17 16 15   < > 20   CREATININE mg/dL 1.43* 1.48* 1.34*   < > 1.47*   CALCIUM mg/dL 8.3* 8.2* 8.4*   < > 8.3*   EGFR mL/min/1.73 53.4* 51.2* 57.7*   < > 51.6*   BILIRUBIN mg/dL  --   --   --   --  0.3   ALK PHOS U/L  --   --   --   --  102   ALT (SGPT) U/L  --   --   --   --  9   AST (SGOT) U/L  --   --   --   --  16   GLUCOSE mg/dL 133* 101* 108*   < > 89    < > =  "values in this interval not displayed.       Radiology:   Imaging Results (Last 72 Hours)       ** No results found for the last 72 hours. **            Culture:  No results found for: \"BLOODCX\", \"URINECX\", \"WOUNDCX\", \"MRSACX\", \"RESPCX\", \"STOOLCX\"      Assessment   Acute kidney injury/ATN  Chronic kidney disease stage IIIb  Hypertension  Diabetes type 2  Diabetic foot ulcer with sepsis  Anemia chronic kidney disease  Obesity  Metabolic acidosis     Plan:  Discussed with patient, nursing  Workup reviewed today  Monitor labs  Weaned IV fluids  ID / podiatry evaluation reviewed as current  Antibiotics per ID-finished ertapenem on 8/7        Willy Arteaga MD  8/11/2024  19:28 CDT      "

## 2024-08-12 NOTE — CASE MANAGEMENT/SOCIAL WORK
Continued Stay Note  [facility]     Patient Name: Erick Luong  MRN: [id number]  Today's Date: 8/12/2024    Admit Date: 7/31/2024    Plan: College   Discharge Plan       Row Name 08/12/24 1239       Plan    Plan College    Plan Comments Patient's insurance has approved for him to go to College.  [facility] can accept patient today, 8/12.  Provider informed of insurance approval via EPIC chat.  Spouse also made aware.    Final Discharge Disposition Code 03 - skilled nursing facility (SNF)      Row Name 08/12/24 1003       Plan    Plan [employer] pending precert    Plan Comments SW has reached out to [place] in admissions at [facility] this am to inquire about status of precert.  Will advise.                   Discharge Codes    No documentation.                 Expected Discharge Date and Time       Expected Discharge Date Expected Discharge Time    Aug 9, 2024               SUZIE Hernandez

## 2024-08-12 NOTE — DISCHARGE PLACEMENT REQUEST
"To: Riley    From: Lisa TASNG [phone]        Erick Luong (69 y.o. Male)       Date of Birth   1955    Social Security Number       [place]   [address] [facility] [address]    Home Phone       MRN   [id number]       [facility]    Marital Status                               Admission Date   7/31/24    Admission Type   Emergency    Admitting Provider   Payam Keyes DO    Attending Provider   Payam Keyes DO    Department, Room/Bed   [facility] 3C, 365/1       Discharge Date       Discharge Disposition       Discharge Destination                                 Attending Provider: Payam Keyes DO    Allergies: Cefepime, Bactrim [Sulfamethoxazole-trimethoprim], Vancomycin, Zolpidem, Metronidazole    Isolation: Contact   Infection: MRSA (05/19/19), COVID (History) (08/08/22), ESBL E coli (06/30/24)   Code Status: CPR    Ht: 182.9 cm (72\")   Wt: 142 kg (312 lb 3.2 oz)    Admission Cmt: None   Principal Problem: DKA, type 2, not at goal [E11.10]                   Active Insurance as of 7/31/2024       Primary Coverage       Payor Plan Insurance Group Employer/Plan Group    [employer] [facility] [employer] EMPLOYEE [id number]       Payor Plan Address Payor Plan Phone Number Payor Plan Fax Number Effective Dates    [address] [phone]  1/1/2022 - None Entered    [facility] [id number]         Subscriber Name Subscriber Birth Date Member ID       ZAINAB LUONG 11/27/1969 [id number]               Secondary Coverage       Payor Plan Insurance Group Employer/Plan Group    MEDICARE MEDICARE A & B        Payor Plan Address Payor Plan Phone Number Payor Plan Fax Number Effective Dates    [address] [phone]  7/1/2013 - None Entered    [facility] [id number]         Subscriber Name Subscriber Birth Date Member ID       ERICK LUONG 1955 [id number]                     Emergency Contacts        (Rel.) Home Phone Work Phone " Mobile Phone    Joan Luong (Spouse) [phone]                 Physician Progress Notes (last 48 hours)        Willy Arteaga MD at 08/11/24 1440          Nephrology ([facility]) Progress Note      Patient:  Erick Luong  YOB: 1955  Date of Service: 8/11/2024  MRN: [id number]   Acct: [id number]   Primary Care Physician: Del Shetty MD  Advance Directive:   Code Status and Medical Interventions: CPR (Attempt to Resuscitate); Full Support   Ordered at: 08/01/24 0053     Level Of Support Discussed With:    Patient     Code Status (Patient has no pulse and is not breathing):    CPR (Attempt to Resuscitate)     Medical Interventions (Patient has pulse or is breathing):    Full Support     Admit Date: 7/31/2024       Hospital Day: 11  Referring Provider: Abebe Garzon DO      Patient personally seen and examined.  Complete chart including Consults, Notes, Operative Reports, Labs, Cardiology, and Radiology studies reviewed as able.        Subjective:  Erick Luong is a 69 y.o. male for whom we were consulted for evaluation and treatment of acute kidney injury. Baseline chronic kidney disease stage 3b. Baseline creatinine approximately 1.5-1.8. Follows in our office.  History of poorly controlled type 2 diabetes, hypertension, coronary artery disease, diabetic foot ulcer, obesity.  On 7/31, patient was found wandering at home confused.  He was brought to ER for evaluation. He has a nonhealing left foot diabetic ulcer with serosanguineous drainage. Blood glucose level was >700 with A1c >12%. Initial creatinine was 2.67 and has steadily improved since he was admitted. Nephrology consulted on 8/1. Hospital course remarkable for improving renal function and improved blood glucose levels. Moved to medical floor after stabilization.     Today, no overnight events.  Hopeful for discharge soon.  Denied current chest pain, shortness of  air at rest, nausea or vomiting.  Up in bed without any complaint with legs down.       Allergies:  Cefepime, Bactrim [sulfamethoxazole-trimethoprim], Vancomycin, Zolpidem, and Metronidazole    Home Meds:  Medications Prior to Admission   Medication Sig Dispense Refill Last Dose    ascorbic acid (VITAMIN C) 1000 MG tablet Take 1 tablet by mouth Daily. 30 tablet 3     bumetanide (BUMEX) 1 MG tablet Take 1 tablet by mouth 2 (Two) Times a Day for 30 days. 60 tablet 0     busPIRone (BUSPAR) 10 MG tablet Take 1 tablet by mouth 2 (Two) Times a Day.       calcitriol (ROCALTROL) 0.5 MCG capsule Take 1 capsule by mouth Daily. 90 capsule 4     carvedilol (COREG) 3.125 MG tablet Take 1 tablet twice a day by oral route. 60 tablet 4     donepezil (ARICEPT) 10 MG tablet Take 1 tablet by mouth Daily. 90 tablet 1     DULoxetine (CYMBALTA) 60 MG capsule Take 1 capsule by mouth Daily. 90 capsule 4     famotidine (PEPCID) 20 MG tablet Take 1 tablet twice a day by oral route. 180 tablet 4     Insulin Glargine (Lantus SoloStar) 100 UNIT/ML injection pen Inject 20 Units under the skin into the appropriate area as directed every night at bedtime. 15 mL 3     Insulin Regular Human, Conc, (HumuLIN R) 500 UNIT/ML solution pen-injector CONCENTRATED injection Inject 40 Units under the skin into the appropriate area as directed 2 (Two) Times a Day Before Meals. Inject 40 units under the skin in the the appropriate area with regular meals AND 40 units with large meals.       Iron-Vitamin C (Vitron-C)  MG tablet Take 1 tablet twice a day by oral route. 60 tablet 2     levocetirizine (XYZAL) 5 MG tablet Take 1 tablet by mouth every day at bedtime. 30 tablet 2     melatonin 3 MG tablet Take 2 tablets by mouth At Night As Needed for Sleep. 30 tablet 0     oxyCODONE (ROXICODONE) 10 MG tablet Take 1 tablet by mouth 2 (Two) Times a Day As Needed. Must last 30 days per md. 55 tablet 0     pantoprazole (PROTONIX) 40 MG EC tablet Take 1 tablet by  mouth Every 12 (Twelve) Hours. 180 tablet 4     pregabalin (LYRICA) 100 MG capsule Take 1 capsule by mouth Daily.       pregabalin (LYRICA) 100 MG capsule Take 2 capsules by mouth every night at bedtime.       rosuvastatin (CRESTOR) 10 MG tablet Take 1 tablet by mouth Every Night. 30 tablet 2     sucralfate (CARAFATE) 1 g tablet Take 1 tablet by mouth 4 (Four) Times a Day before meals. 360 tablet 1     Diclofenac Sodium (VOLTAREN) 1 % gel gel Apply 2 g topically to the appropriate area as directed 4 (Four) Times a Day As Needed. 300 g 11     nitroglycerin (NITROSTAT) 0.4 MG SL tablet Place 1 tablet under the tongue Every 5 (Five) Minutes As Needed for Chest Pain. Take no more than 3 doses in 15 minutes.       polyethylene glycol (MIRALAX) 17 GM/SCOOP powder Take 17 g by mouth Daily As Needed (constipation).       sennosides-docusate (PERICOLACE) 8.6-50 MG per tablet Take 1 tablet by mouth Every Night. Obtain OTC          Medicines:  Current Facility-Administered Medications   Medication Dose Route Frequency Provider Last Rate Last Admin    ascorbic acid (VITAMIN C) tablet 1,000 mg  1,000 mg Oral Daily Reuben Garza APRN   1,000 mg at 08/11/24 0828    sennosides-docusate (PERICOLACE) 8.6-50 MG per tablet 2 tablet  2 tablet Oral BID Lamine Figueroa APRN   2 tablet at 08/09/24 0903    And    polyethylene glycol (MIRALAX) packet 17 g  17 g Oral Daily PRN Lamine Figueroa APRN        And    bisacodyl (DULCOLAX) EC tablet 5 mg  5 mg Oral Daily PRN Lamine Figueroa APRN        And    bisacodyl (DULCOLAX) suppository 10 mg  10 mg Rectal Daily PRN Lamine Figueroa APRN   10 mg at 08/02/24 0941    Calcium Replacement - Follow Nurse / BPA Driven Protocol   Does not apply PRN Abebe Garzon DO        dextrose (D50W) (25 g/50 mL) IV injection 10-50 mL  10-50 mL Intravenous Q15 Min PRN Abebe Garzon DO        dextrose (D50W) (25 g/50 mL) IV injection 25 g  25 g Intravenous Q15 Min PRN Lamine Figueroa, APRN         dextrose (GLUTOSE) oral gel 15 g  15 g Oral Q15 Min PRN Lamine Figueroa APRN        donepezil (ARICEPT) tablet 10 mg  10 mg Oral Daily Reuben Garza APRN   10 mg at 08/11/24 0829    DULoxetine (CYMBALTA) DR capsule 60 mg  60 mg Oral Daily Reuben Garza APRN   60 mg at 08/11/24 0828    Enoxaparin Sodium (LOVENOX) syringe 135 mg  1 mg/kg Subcutaneous Q12H Abebe Garzon DO   135 mg at 08/11/24 0831    famotidine (PEPCID) tablet 20 mg  20 mg Oral Q12H Reuben Garza APRN   20 mg at 08/11/24 0829    glucagon (GLUCAGEN) injection 1 mg  1 mg Intramuscular Q15 Min PRN Lamine Figueroa APRN        insulin glargine (LANTUS, SEMGLEE) injection 10 Units  10 Units Subcutaneous Daily Keren Jamison MD   10 Units at 08/11/24 0827    insulin regular (humuLIN R,novoLIN R) injection 3-14 Units  3-14 Units Subcutaneous 4x Daily AC & at Bedtime Keren Jamison MD   3 Units at 08/11/24 1730    ipratropium-albuterol (DUO-NEB) nebulizer solution 3 mL  3 mL Nebulization Q4H PRN Alejandrina Barnett DO   3 mL at 08/11/24 1906    Magnesium Standard Dose Replacement - Follow Nurse / BPA Driven Protocol   Does not apply PRN Abebe Garzon DO        melatonin tablet 2.5 mg  2.5 mg Oral Nightly Reuben Garza APRN   2.5 mg at 08/10/24 2058    nitroglycerin (NITROSTAT) SL tablet 0.4 mg  0.4 mg Sublingual Q5 Min PRN Lamine Figueroa APRN        ondansetron (ZOFRAN) injection 4 mg  4 mg Intravenous Q6H PRN Payam Keyes DO   4 mg at 08/08/24 0900    oxyCODONE-acetaminophen (PERCOCET) 5-325 MG per tablet 1 tablet  1 tablet Oral Q6H PRN Garrett Alicia MD   1 tablet at 08/11/24 0108    pantoprazole (PROTONIX) EC tablet 40 mg  40 mg Oral Q12H Reuben Garza APRN   40 mg at 08/11/24 0829    Pharmacy to Dose enoxaparin (LOVENOX)   Does not apply Continuous PRN Abebe Garzon DO        Phosphorus Replacement - Follow Nurse / BPA Driven Protocol   Does not apply PRN Abebe Garzon DO         polyethylene glycol (MIRALAX) packet 17 g  17 g Oral Daily Reuben Garza APRN   17 g at 08/09/24 0904    Potassium Replacement - Follow Nurse / BPA Driven Protocol   Does not apply PRN Abebe Garzon DO        pregabalin (LYRICA) capsule 100 mg  100 mg Oral Nightly Reuben Garza APRN   100 mg at 08/10/24 2058    pregabalin (LYRICA) capsule 50 mg  50 mg Oral Daily Reuben Garza APRN   50 mg at 08/11/24 0828    rosuvastatin (CRESTOR) tablet 10 mg  10 mg Oral Nightly Reuben Garza APRN   10 mg at 08/10/24 2058    sodium bicarbonate tablet 650 mg  650 mg Oral TID Brian Lomas MD   650 mg at 08/11/24 1730    sodium chloride 0.9 % flush 10 mL  10 mL Intravenous Q12H Abebe Garzon DO   10 mL at 08/08/24 2023    sodium chloride 0.9 % flush 10 mL  10 mL Intravenous PRN Abebe Garzon DO        sodium chloride 0.9 % flush 10 mL  10 mL Intravenous Q12H Lamine Figueroa APRN   10 mL at 08/11/24 0831    sodium chloride 0.9 % flush 10 mL  10 mL Intravenous PRN Lamine Figueroa APRN        sodium chloride 0.9 % infusion 40 mL  40 mL Intravenous PRN Abebe Garzon,         sodium chloride 0.9 % infusion 40 mL  40 mL Intravenous PRN Lamine Figueroa APRN           Past Medical History:  Past Medical History:   Diagnosis Date    Arthritis     Autonomic disease     CAD (coronary artery disease) 02/06/2017    Cervical radiculopathy 09/16/2021    Chronic constipation with acute exaccerbation 05/10/2021    Coronary artery disease     Degeneration of cervical intervertebral disc 08/11/2021    Diabetes mellitus     Diabetic foot ulcer 08/31/2020    Diabetic polyneuropathy associated with type 2 diabetes mellitus 01/18/2021    Elevated cholesterol     Gastroesophageal reflux disease 05/13/2019    Headache     HTN (hypertension), benign 05/03/2017    Hyperlipidemia     Hypertension     Mixed hyperlipidemia 02/07/2017    Multiple lung nodules 01/26/2020    5mm, 9 mm RLL identified 1/2020, not present  10/2019.    Myocardial infarction     Osteomyelitis 01/22/2020    Osteomyelitis of fifth toe of right foot 10/07/2019    Pancreatitis     Persistent insomnia 01/20/2020    Renal disorder     Sleep apnea 02/06/2017    Sleep apnea with use of continuous positive airway pressure (CPAP)     NON-COMPLIANT    Slow transit constipation 01/16/2019    Spinal stenosis in cervical region 09/16/2021    Vitamin D deficiency 03/02/2021       Past Surgical History:  Past Surgical History:   Procedure Laterality Date    ABDOMINAL SURGERY      AMPUTATION FOOT / TOE Left 10/2021    5th digit     ANTERIOR CERVICAL DISCECTOMY W/ FUSION N/A 08/05/2022    Procedure: CERVICAL DISCECTOMY ANTERIOR WITH FUSION C5-6 with possible plating of C5-7 with neuromonitoring and 1 c-arm;  Surgeon: Karel Soliz MD;  Location: [facility] OR;  Service: Neurosurgery;  Laterality: N/A;    APPENDECTOMY      BACK SURGERY      CARDIAC CATHETERIZATION Left 02/08/2021    Procedure: Left Heart Cath w poss intervention left anatomical snuff box acess;  Surgeon: Omkar Charles DO;  Location:  [facility] CATH INVASIVE LOCATION;  Service: Cardiology;  Laterality: Left;    CARDIAC SURGERY      CATARACT EXTRACTION      CERVICAL SPINE SURGERY      COLONOSCOPY N/A 01/31/2017    Normal exam repeat in 5 years    COLONOSCOPY N/A 02/11/2019    Mild acute inflammation    COLONOSCOPY N/A 04/07/2024    2 areas at 10 and 20 cm with friability ulceration 2 clips placed at 20 cm and 4 clips at 10 cm poor prep normal mucosa,mild eroisions and ulcerations in visible vessels    COLONOSCOPY N/A 7/1/2024    Procedure: COLONOSCOPY WITH ANESTHESIA;  Surgeon: Arsalan Lorenzo DO;  Location:  [facility] ENDOSCOPY;  Service: Gastroenterology;  Laterality: N/A;  pre op constipation/diarrhea  post poor prep  pcp Del Shetty    COLONOSCOPY N/A 7/2/2024    Procedure: COLONOSCOPY WITH ANESTHESIA;  Surgeon: Agapito Christopher MD;  Location:  [facility] ENDOS[facility];  Service: Gastroenterology;   Laterality: N/A;  pre rectal bleeding  post poor prep  pcp Del Shetty MD    COLONOSCOPY W/ POLYPECTOMY  2014    Hyperplastic polyp    CORONARY ARTERY BYPASS GRAFT  10/2015    ENDOSCOPY  2011    Gastritis with hemorrhage    ENDOSCOPY N/A 2017    Normal exam    ENDOSCOPY N/A 2019    Gastritis    ENDOSCOPY N/A 2020    Non-erosive gastritis with hemorrhage    ENDOSCOPY N/A 02/10/2021    Esophagitis    ENDOSCOPY N/A 2024    There were esophageal mucosal changes suspicious for short-segment Low's esophagus present in the distal esophagus. The maximum longitudinal extent of these mucosal changes was 2 cm in length. Mucosa was biopsied with a cold forceps for histologyDistal esophagus, biopsies: Mild chronic active esophagogastritis. No evidence of intestinal metaplasia, dysplasia. Antrum, bx, Mild chronic gastritis    FOOT SURGERY Left     INCISION AND DRAINAGE OF WOUND Left 2007    spider bite       Family History  Family History   Problem Relation Age of Onset    Colon cancer Father     Heart disease Father     Colon cancer Sister     Colon polyps Sister     Alzheimer's disease Mother     Coronary artery disease Sister     Coronary artery disease Sister        Social History  Social History     Socioeconomic History    Marital status:    Tobacco Use    Smoking status: Former     Current packs/day: 0.00     Types: Cigarettes     Quit date:      Years since quittin.6    Smokeless tobacco: Never    Tobacco comments:     smoked in [place]   Vaping Use    Vaping status: Never Used   Substance and Sexual Activity    Alcohol use: No    Drug use: No    Sexual activity: Defer       Review of Systems:  History obtained from chart review and the patient  General ROS: No fever or chills  Respiratory ROS: No cough, shortness of breath, wheezing  Cardiovascular ROS: No chest pain or palpitations  Gastrointestinal ROS: No abdominal pain or melena  Genito-Urinary ROS:  No dysuria or hematuria  Psych ROS: No anxiety and depression  14 point ROS reviewed with the patient and negative except as noted above and in the HPI unless unable to obtain.    Objective:  Patient Vitals for the past 24 hrs:   BP Temp Temp src Pulse Resp SpO2 Weight   08/11/24 1919 154/73 97.8 °F (36.6 °C) Oral 88 16 97 % --   08/11/24 1906 -- -- -- 84 16 95 % --   08/11/24 1849 145/76 -- -- 83 -- 100 % --   08/11/24 1545 136/72 97.6 °F (36.4 °C) Oral 76 16 95 % --   08/11/24 1100 157/88 97.7 °F (36.5 °C) Oral 83 16 100 % --   08/11/24 0755 146/61 98.2 °F (36.8 °C) Oral 66 16 94 % --   08/11/24 0423 135/58 97.7 °F (36.5 °C) Oral 70 16 94 % (!) 142 kg (312 lb 3.2 oz)   08/11/24 0025 149/81 98 °F (36.7 °C) Oral 70 16 91 % --   08/10/24 1946 143/75 98 °F (36.7 °C) Oral 89 16 99 % --       Intake/Output Summary (Last 24 hours) at 8/11/2024 1928  Last data filed at 8/11/2024 1919  Gross per 24 hour   Intake 480 ml   Output 775 ml   Net -295 ml     General: awake/alert   Chest:  clear to auscultation bilaterally without respiratory distress  CVS: regular rate and rhythm  Abdominal: soft, nontender, positive bowel sounds, obese  Extremities: tr ble edema  Skin: warm and dry without rash with wounds as documented      Labs:  Results from last 7 days   Lab Units 08/11/24  0537 08/10/24  0626 08/09/24  0518   WBC 10*3/mm3 6.60 7.02 7.27   HEMOGLOBIN g/dL 10.1* 10.1* 9.9*   HEMATOCRIT % 31.3* 32.1* 31.6*   PLATELETS 10*3/mm3 248 271 247         Results from last 7 days   Lab Units 08/11/24 0537 08/10/24  0626 08/09/24 0518 08/06/24  0508 08/05/24  0444   SODIUM mmol/L 140 142 141   < > 144   POTASSIUM mmol/L 4.0 3.8 4.1   < > 3.9   CHLORIDE mmol/L 110* 109* 110*   < > 112*   CO2 mmol/L 22.0 23.0 22.0   < > 21.0*   BUN mg/dL 17 16 15   < > 20   CREATININE mg/dL 1.43* 1.48* 1.34*   < > 1.47*   CALCIUM mg/dL 8.3* 8.2* 8.4*   < > 8.3*   EGFR mL/min/1.73 53.4* 51.2* 57.7*   < > 51.6*   BILIRUBIN mg/dL  --   --   --   --  0.3  "  ALK PHOS U/L  --   --   --   --  102   ALT (SGPT) U/L  --   --   --   --  9   AST (SGOT) U/L  --   --   --   --  16   GLUCOSE mg/dL 133* 101* 108*   < > 89    < > = values in this interval not displayed.       Radiology:   Imaging Results (Last 72 Hours)       ** No results found for the last 72 hours. **            Culture:  No results found for: \"BLOODCX\", \"URINECX\", \"WOUNDCX\", \"MRSACX\", \"RESPCX\", \"STOOLCX\"      Assessment   Acute kidney injury/ATN  Chronic kidney disease stage IIIb  Hypertension  Diabetes type 2  Diabetic foot ulcer with sepsis  Anemia chronic kidney disease  Obesity  Metabolic acidosis     Plan:  Discussed with patient, nursing  Workup reviewed today  Monitor labs  Weaned IV fluids  ID / podiatry evaluation reviewed as current  Antibiotics per ID-finished ertapenem on 8/7        Willy Arteaga MD  8/11/2024  19:28 CDT        Electronically signed by Willy Arteaga MD at 08/11/24 1928       Julia Adrian MD at 08/11/24 1240          INFECTIOUS DISEASES PROGRESS NOTE    Patient:  Erick Luong  YOB: 1955  MRN: [id number]   Admit date: 7/31/2024   Admitting Physician: Alejandrina Barnett DO  Primary Care Physician: Del Shetty MD    Chief Complaint: Left foot wound      Interval History: Patient indicates that he has been up to the chair already today.  When asked about foot pain he said really only his heel hurts.        Allergies:   Allergies   Allergen Reactions    Cefepime Hives and Anaphylaxis    Bactrim [Sulfamethoxazole-Trimethoprim] Other (See Comments)     \"RENAL FAILURE\"    Vancomycin Itching    Zolpidem Mental Status Change     \"makes him crazy\"    Metronidazole Rash       Current Scheduled Medications:   ascorbic acid, 1,000 mg, Oral, Daily  donepezil, 10 mg, Oral, Daily  DULoxetine, 60 mg, Oral, Daily  enoxaparin, 1 mg/kg, Subcutaneous, Q12H  famotidine, 20 mg, Oral, Q12H  insulin glargine, 10 Units, Subcutaneous, Daily  insulin " "regular, 3-14 Units, Subcutaneous, 4x Daily AC & at Bedtime  melatonin, 2.5 mg, Oral, Nightly  pantoprazole, 40 mg, Oral, Q12H  polyethylene glycol, 17 g, Oral, Daily  pregabalin, 100 mg, Oral, Nightly  pregabalin, 50 mg, Oral, Daily  rosuvastatin, 10 mg, Oral, Nightly  senna-docusate sodium, 2 tablet, Oral, BID  sodium bicarbonate, 650 mg, Oral, TID  sodium chloride, 10 mL, Intravenous, Q12H  sodium chloride, 10 mL, Intravenous, Q12H      Current PRN Medications:    senna-docusate sodium **AND** polyethylene glycol **AND** bisacodyl **AND** bisacodyl    Calcium Replacement - Follow Nurse / BPA Driven Protocol    dextrose    dextrose    dextrose    glucagon (human recombinant)    Magnesium Standard Dose Replacement - Follow Nurse / BPA Driven Protocol    nitroglycerin    ondansetron    oxyCODONE-acetaminophen    Pharmacy to Dose enoxaparin (LOVENOX)    Phosphorus Replacement - Follow Nurse / BPA Driven Protocol    Potassium Replacement - Follow Nurse / BPA Driven Protocol    sodium chloride    sodium chloride    sodium chloride    sodium chloride    Pharmacy to Dose enoxaparin (LOVENOX),            Objective     Vital Signs:  Temp (24hrs), Av.8 °F (36.6 °C), Min:97.3 °F (36.3 °C), Max:98.2 °F (36.8 °C)      /88 (BP Location: Right arm, Patient Position: Sitting)   Pulse 83   Temp 97.7 °F (36.5 °C) (Oral)   Resp 16   Ht 182.9 cm (72\")   Wt (!) 142 kg (312 lb 3.2 oz)   SpO2 100%   BMI 42.34 kg/m²         Physical Exam:    General: Patient is lying in bed on his left side in no acute distress  Respiratory: Effort even and unlabored,  Left foot dressing is in place.  Betadine is dried but is soaked through the heel on the lateral foot.  Palpation along the lateral foot and plantar surface does not reveal any fluctuance.  No pain elicited.        Results Review:    I reviewed the patient's new clinical results.    Lab Results:    CBC:   Lab Results   Lab 24  0444 24  0449 24  0518 " 08/10/24  0626 08/11/24  0537   WBC 5.89 8.08 7.27 7.02 6.60   HEMOGLOBIN 9.5* 9.6* 9.9* 10.1* 10.1*   HEMATOCRIT 30.1* 30.5* 31.6* 32.1* 31.3*   PLATELETS 210 242 247 271 248        AutoDiff:   Lab Results   Lab 08/05/24 0444   NEUTROPHIL % 64.0   LYMPHOCYTE % 19.9   MONOCYTES % 9.2   EOSINOPHIL % 4.8   BASOPHIL % 0.7   NEUTROS ABS 3.78   LYMPHS ABS 1.17   MONOS ABS 0.54   EOS ABS 0.28   BASOS ABS 0.04        Manual Diff:    Lab Results   Lab 08/05/24 0444   NEUTROS ABS 3.78           CMP:   Lab Results   Lab 08/05/24 0444 08/06/24  0508 08/09/24 0518 08/10/24  0626 08/11/24  0537   SODIUM 144   < > 141 142 140   POTASSIUM 3.9   < > 4.1 3.8 4.0   CHLORIDE 112*   < > 110* 109* 110*   CO2 21.0*   < > 22.0 23.0 22.0   BUN 20   < > 15 16 17   CREATININE 1.47*   < > 1.34* 1.48* 1.43*   CALCIUM 8.3*   < > 8.4* 8.2* 8.3*   BILIRUBIN 0.3  --   --   --   --    ALK PHOS 102  --   --   --   --    ALT (SGPT) 9  --   --   --   --    AST (SGOT) 16  --   --   --   --    GLUCOSE 89   < > 108* 101* 133*    < > = values in this interval not displayed.       Estimated Creatinine Clearance: 72 mL/min (A) (by C-G formula based on SCr of 1.43 mg/dL (H)).    Culture Results:    Microbiology Results (last 10 days)       Procedure Component Value - Date/Time    Wound Culture - Drainage, Foot, Left [[id number]]  (Abnormal) Collected: 08/02/24 1338    Lab Status: Final result Specimen: Drainage from Foot, Left Updated: 08/04/24 0857     Wound Culture Light growth (2+) Streptococcus agalactiae (Group B)     Comment:   This organism is considered to be universally susceptible to penicillin.  No further antibiotic testing will be performed. If Clindamycin or Erythromycin is the drug of choice, notify the laboratory within 7 days to request susceptibility testing.        Gram Stain Rare (1+) WBCs seen      No organisms seen    Wound Culture - Wound, Foot, Left [[id number]]  (Abnormal) Collected: 08/02/24 1032    Lab Status: Final result  Specimen: Wound from Foot, Left Updated: 08/04/24 0857     Wound Culture Light growth (2+) Streptococcus agalactiae (Group B)     Comment:   This organism is considered to be universally susceptible to penicillin.  No further antibiotic testing will be performed. If Clindamycin or Erythromycin is the drug of choice, notify the laboratory within 7 days to request susceptibility testing.        Gram Stain Few (2+) WBCs seen      Rare (1+) Gram positive cocci    Eosinophil Smear - Urine, Urine, Clean Catch [[id number]]  (Normal) Collected: 08/01/24 1547    Lab Status: Final result Specimen: Urine, Clean Catch Updated: 08/02/24 0149     Eosinophil Smear 0 % EOS/100 Cells                  Radiology:   Imaging Results (Last 72 Hours)       ** No results found for the last 72 hours. **                Active Hospital Problems    Diagnosis     **DKA, type 2, not at goal     E. coli UTI, ESBL     Atrial fibrillation     Chronic heart failure with preserved ejection fraction (HFpEF)     Chronic diastolic heart failure     GERD without esophagitis     Diabetic ulcer of left foot associated with type 2 diabetes mellitus          IMPRESSION:  Diabetic foot infection-with group B streptococcus.  Patient is status post bedside debridement per SUSAN Hoffmann.  Completed IV antibiotic treatment August 7.  Urinary tract infection with ESBL E. coli.  Documentation reveals that this specimen was obtained via straight catheterization and patient did complain of some urinary frequency.  Completed short antibiotic course.  History of osteomyelitis of left foot with MRSA status posttreatment with vancomycin followed by linezolid.    Uncontrolled diabetes mellitus with hemoglobin A1c greater than 12.    Chronic kidney disease stage IIIb with acute kidney injury on admission that has resolved.  Nephrology continues to follow.    Positive blood cultures for coagulase-negative staph-contaminant        RECOMMENDATION:   Continue local wound  "care  Will place orders to take photos of foot with next dressing change  No plan for any additional antibiotics at this time unless there is change in his status or change in appearance of foot to suggest infection recurrence.        Julia Ardian MD  08/11/24  12:40 CDT        Electronically signed by Julia Adrian MD at 08/11/24 1319       Ericka Alejandrina Collins DO at 08/11/24 1058              [facility] Medicine Services  INPATIENT PROGRESS NOTE    Patient Name: Erick Luong  Date of Admission: 7/31/2024  Today's Date: 08/11/24  Length of Stay: 11  Primary Care Physician: Del Shetty MD    Subjective   Chief Complaint: diabetic foot ulcer.   HPI     Patient relates he did walk yesterday.  Did \"ok\"  No new problems.  Awaiting nursing home placement      Review of Systems   All pertinent negatives and positives are as above. All other systems have been reviewed and are negative unless otherwise stated.     Objective    Temp:  [97.3 °F (36.3 °C)-98.2 °F (36.8 °C)] 98.2 °F (36.8 °C)  Heart Rate:  [66-90] 66  Resp:  [16] 16  BP: (135-164)/(58-81) 146/61  Physical Exam  Vitals and nursing note reviewed.   Constitutional:       Appearance: He is obese.   HENT:      Head: Normocephalic and atraumatic.      Right Ear: External ear normal.      Left Ear: External ear normal.      Nose: Nose normal.      Mouth/Throat:      Mouth: Mucous membranes are moist.   Eyes:      Extraocular Movements: Extraocular movements intact.      Conjunctiva/sclera: Conjunctivae normal.      Pupils: Pupils are equal, round, and reactive to light.   Cardiovascular:      Rate and Rhythm: Normal rate and regular rhythm.      Pulses: Normal pulses.      Heart sounds: Normal heart sounds.   Pulmonary:      Effort: Pulmonary effort is normal.   Abdominal:      General: Bowel sounds are normal.      Palpations: Abdomen is soft.   Musculoskeletal:      Cervical back: Normal range of motion.      " Comments: Moves all extremities   Skin:     General: Skin is warm and dry.      Capillary Refill: Capillary refill takes less than 2 seconds.      Comments: Dressing left foot/heel intact.   Neurological:      Mental Status: He is alert and oriented to person, place, and time.   Psychiatric:      Comments: Affect  is flat.             File Link    Scan on 8/7/2024 1702 by Aicha Smallwood RN: Wound Left lateral foot Diabetic Ulcer        Key Information    Document ID File Type Document Type Description   [id number] Image WOUND IMAGE Wound Left lateral foot Diabetic Ulcer     Import Information    Attached At Date Time User Dept   Encounter Level 8/7/2024  5:02 PM Aicha Smallwood RN [facility]     Encounter    Hospital Encounter on 7/31/24             Results Review:  I have reviewed the labs, radiology results, and diagnostic studies.    Laboratory Data:   Results from last 7 days   Lab Units 08/11/24  0537 08/10/24  0626 08/09/24  0518   WBC 10*3/mm3 6.60 7.02 7.27   HEMOGLOBIN g/dL 10.1* 10.1* 9.9*   HEMATOCRIT % 31.3* 32.1* 31.6*   PLATELETS 10*3/mm3 248 271 247        Results from last 7 days   Lab Units 08/11/24  0537 08/10/24  0626 08/09/24  0518 08/06/24  0508 08/05/24  0444   SODIUM mmol/L 140 142 141   < > 144   POTASSIUM mmol/L 4.0 3.8 4.1   < > 3.9   CHLORIDE mmol/L 110* 109* 110*   < > 112*   CO2 mmol/L 22.0 23.0 22.0   < > 21.0*   BUN mg/dL 17 16 15   < > 20   CREATININE mg/dL 1.43* 1.48* 1.34*   < > 1.47*   CALCIUM mg/dL 8.3* 8.2* 8.4*   < > 8.3*   BILIRUBIN mg/dL  --   --   --   --  0.3   ALK PHOS U/L  --   --   --   --  102   ALT (SGPT) U/L  --   --   --   --  9   AST (SGOT) U/L  --   --   --   --  16   GLUCOSE mg/dL 133* 101* 108*   < > 89    < > = values in this interval not displayed.       Culture Data:   Blood Culture   Date Value Ref Range Status   07/31/2024 No growth at 5 days  Final   07/31/2024 Staphylococcus, coagulase negative (C)  Final     Urine Culture   Date Value Ref  Range Status   07/31/2024 >100,000 CFU/mL Escherichia coli ESBL (A)  Final     Wound Culture   Date Value Ref Range Status   08/02/2024 (A)  Final    Light growth (2+) Streptococcus agalactiae (Group B)     Comment:       This organism is considered to be universally susceptible to penicillin.  No further antibiotic testing will be performed. If Clindamycin or Erythromycin is the drug of choice, notify the laboratory within 7 days to request susceptibility testing.   08/02/2024 (A)  Final    Light growth (2+) Streptococcus agalactiae (Group B)     Comment:       This organism is considered to be universally susceptible to penicillin.  No further antibiotic testing will be performed. If Clindamycin or Erythromycin is the drug of choice, notify the laboratory within 7 days to request susceptibility testing.       Radiology Data:   Imaging Results (Last 24 Hours)       ** No results found for the last 24 hours. **            I have reviewed the patient's current medications.     Assessment/Plan   Assessment  Active Hospital Problems    Diagnosis     **DKA, type 2, not at goal     E. coli UTI, ESBL     Atrial fibrillation     Chronic heart failure with preserved ejection fraction (HFpEF)     Chronic diastolic heart failure     GERD without esophagitis     Diabetic ulcer of left foot associated with type 2 diabetes mellitus        Treatment Plan  Continue current treatment of foot wound.  Continue current diabetic management.  Continue to await nursing home placement.  Currently waiting on pre-CERT.       Medical Decision Making  Number and Complexity of problems:   3 acute, high complexity problems  4+ chronic, moderate complexity problems     Differential Diagnosis: None     Conditions and Status        Condition is improving.     [facility] Data  External documents reviewed: Care Everywhere  Cardiac tracing (EKG, telemetry) interpretation: See HPI  Radiology interpretation: See HPI  Labs reviewed: See HPI  Any tests that  were considered but not ordered: None     Decision rules/scores evaluated (example [id number], Wells, etc): None     Discussed with: The patient     Care Planning  Shared decision making: Patient  Code status and discussions: Full code     Disposition  Social Determinants of Health that impact treatment or disposition: none     I expect the patient to be discharged to SNF  in 1 days.    Electronically signed by Alejandrina Barnett DO, 08/11/24, 10:58 CDT.      Electronically signed by Alejandrina Barnett DO at 08/11/24 1059       Willy Arteaga MD at 08/10/24 1605          Nephrology ([facility]) Progress Note      Patient:  Erick Luong  YOB: 1955  Date of Service: 8/10/2024  MRN: [id number]   Acct: [id number]   Primary Care Physician: Del Shetty MD  Advance Directive:   Code Status and Medical Interventions: CPR (Attempt to Resuscitate); Full Support   Ordered at: 08/01/24 0053     Level Of Support Discussed With:    Patient     Code Status (Patient has no pulse and is not breathing):    CPR (Attempt to Resuscitate)     Medical Interventions (Patient has pulse or is breathing):    Full Support     Admit Date: 7/31/2024       Hospital Day: 10  Referring Provider: Abebe Garzon DO      Patient personally seen and examined.  Complete chart including Consults, Notes, Operative Reports, Labs, Cardiology, and Radiology studies reviewed as able.        Subjective:  Erick Luong is a 69 y.o. male for whom we were consulted for evaluation and treatment of acute kidney injury. Baseline chronic kidney disease stage 3b. Baseline creatinine approximately 1.5-1.8. Follows in our office.  History of poorly controlled type 2 diabetes, hypertension, coronary artery disease, diabetic foot ulcer, obesity.  On 7/31, patient was found wandering at home confused.  He was brought to ER for evaluation. He has a nonhealing left foot diabetic ulcer with serosanguineous  drainage. Blood glucose level was >700 with A1c >12%. Initial creatinine was 2.67 and has steadily improved since he was admitted. Nephrology consulted on 8/1. Hospital course remarkable for improving renal function and improved blood glucose levels. Moved to medical floor after stabilization.     Today, no overnight events.  Hopeful for discharge soon.  Denied current chest pain, shortness of air at rest, nausea or vomiting.  Up in bed without any complaint.       Allergies:  Cefepime, Bactrim [sulfamethoxazole-trimethoprim], Vancomycin, Zolpidem, and Metronidazole    Home Meds:  Medications Prior to Admission   Medication Sig Dispense Refill Last Dose    ascorbic acid (VITAMIN C) 1000 MG tablet Take 1 tablet by mouth Daily. 30 tablet 3     bumetanide (BUMEX) 1 MG tablet Take 1 tablet by mouth 2 (Two) Times a Day for 30 days. 60 tablet 0     busPIRone (BUSPAR) 10 MG tablet Take 1 tablet by mouth 2 (Two) Times a Day.       calcitriol (ROCALTROL) 0.5 MCG capsule Take 1 capsule by mouth Daily. 90 capsule 4     carvedilol (COREG) 3.125 MG tablet Take 1 tablet twice a day by oral route. 60 tablet 4     donepezil (ARICEPT) 10 MG tablet Take 1 tablet by mouth Daily. 90 tablet 1     DULoxetine (CYMBALTA) 60 MG capsule Take 1 capsule by mouth Daily. 90 capsule 4     famotidine (PEPCID) 20 MG tablet Take 1 tablet twice a day by oral route. 180 tablet 4     Insulin Glargine (Lantus SoloStar) 100 UNIT/ML injection pen Inject 20 Units under the skin into the appropriate area as directed every night at bedtime. 15 mL 3     Insulin Regular Human, Conc, (HumuLIN R) 500 UNIT/ML solution pen-injector CONCENTRATED injection Inject 40 Units under the skin into the appropriate area as directed 2 (Two) Times a Day Before Meals. Inject 40 units under the skin in the the appropriate area with regular meals AND 40 units with large meals.       Iron-Vitamin C (Vitron-C)  MG tablet Take 1 tablet twice a day by oral route. 60 tablet 2      levocetirizine (XYZAL) 5 MG tablet Take 1 tablet by mouth every day at bedtime. 30 tablet 2     melatonin 3 MG tablet Take 2 tablets by mouth At Night As Needed for Sleep. 30 tablet 0     oxyCODONE (ROXICODONE) 10 MG tablet Take 1 tablet by mouth 2 (Two) Times a Day As Needed. Must last 30 days per md. 55 tablet 0     pantoprazole (PROTONIX) 40 MG EC tablet Take 1 tablet by mouth Every 12 (Twelve) Hours. 180 tablet 4     pregabalin (LYRICA) 100 MG capsule Take 1 capsule by mouth Daily.       pregabalin (LYRICA) 100 MG capsule Take 2 capsules by mouth every night at bedtime.       rosuvastatin (CRESTOR) 10 MG tablet Take 1 tablet by mouth Every Night. 30 tablet 2     sucralfate (CARAFATE) 1 g tablet Take 1 tablet by mouth 4 (Four) Times a Day before meals. 360 tablet 1     Diclofenac Sodium (VOLTAREN) 1 % gel gel Apply 2 g topically to the appropriate area as directed 4 (Four) Times a Day As Needed. 300 g 11     nitroglycerin (NITROSTAT) 0.4 MG SL tablet Place 1 tablet under the tongue Every 5 (Five) Minutes As Needed for Chest Pain. Take no more than 3 doses in 15 minutes.       polyethylene glycol (MIRALAX) 17 GM/SCOOP powder Take 17 g by mouth Daily As Needed (constipation).       sennosides-docusate (PERICOLACE) 8.6-50 MG per tablet Take 1 tablet by mouth Every Night. Obtain OTC          Medicines:  Current Facility-Administered Medications   Medication Dose Route Frequency Provider Last Rate Last Admin    ascorbic acid (VITAMIN C) tablet 1,000 mg  1,000 mg Oral Daily Reuben Garza APRN   1,000 mg at 08/10/24 0940    sennosides-docusate (PERICOLACE) 8.6-50 MG per tablet 2 tablet  2 tablet Oral BID Lamine Figueroa APRN   2 tablet at 08/09/24 0903    And    polyethylene glycol (MIRALAX) packet 17 g  17 g Oral Daily PRN Lamine Figueroa APRN        And    bisacodyl (DULCOLAX) EC tablet 5 mg  5 mg Oral Daily PRN Lamine Figueroa APRN        And    bisacodyl (DULCOLAX) suppository 10 mg  10 mg Rectal Daily PRN  Lamine Figueroa APRN   10 mg at 08/02/24 0941    Calcium Replacement - Follow Nurse / BPA Driven Protocol   Does not apply PRN Abebe Garzon DO        dextrose (D50W) (25 g/50 mL) IV injection 10-50 mL  10-50 mL Intravenous Q15 Min PRN Abebe Garzon DO        dextrose (D50W) (25 g/50 mL) IV injection 25 g  25 g Intravenous Q15 Min PRN Lamine Figueroa APRN        dextrose (GLUTOSE) oral gel 15 g  15 g Oral Q15 Min PRN Lamine Figueroa APRN        donepezil (ARICEPT) tablet 10 mg  10 mg Oral Daily Reuben Garza APRN   10 mg at 08/10/24 0940    DULoxetine (CYMBALTA) DR capsule 60 mg  60 mg Oral Daily Reuben Garza APRN   60 mg at 08/10/24 0940    Enoxaparin Sodium (LOVENOX) syringe 135 mg  1 mg/kg Subcutaneous Q12H Abebe Garzon DO   135 mg at 08/10/24 0939    famotidine (PEPCID) tablet 20 mg  20 mg Oral Q12H Reuben Garza APRN   20 mg at 08/10/24 0940    glucagon (GLUCAGEN) injection 1 mg  1 mg Intramuscular Q15 Min PRN aLmine Figueroa APRN        insulin glargine (LANTUS, SEMGLEE) injection 10 Units  10 Units Subcutaneous Daily Keren Jamison MD   10 Units at 08/10/24 0939    insulin regular (humuLIN R,novoLIN R) injection 3-14 Units  3-14 Units Subcutaneous 4x Daily AC & at Bedtime Keren Jamison MD   3 Units at 08/10/24 1741    Magnesium Standard Dose Replacement - Follow Nurse / BPA Driven Protocol   Does not apply PRN Abebe Garzon DO        melatonin tablet 2.5 mg  2.5 mg Oral Nightly Reuben Garza APRN   2.5 mg at 08/09/24 2118    nitroglycerin (NITROSTAT) SL tablet 0.4 mg  0.4 mg Sublingual Q5 Min PRN Lamine Figueroa APRN        ondansetron (ZOFRAN) injection 4 mg  4 mg Intravenous Q6H PRN Payam Keyes DO   4 mg at 08/08/24 0900    oxyCODONE (ROXICODONE) immediate release tablet 10 mg  10 mg Oral BID Lamine Figueroa APRN   10 mg at 08/10/24 0940    oxyCODONE-acetaminophen (PERCOCET) 5-325 MG per tablet 1 tablet  1 tablet Oral Q6H PRN Garrett Alicia  MD Rick   1 tablet at 08/10/24 0331    pantoprazole (PROTONIX) EC tablet 40 mg  40 mg Oral Q12H Reuben Garza APRN   40 mg at 08/10/24 0940    Pharmacy to Dose enoxaparin (LOVENOX)   Does not apply Continuous PRN Abebe Garzon DO        Phosphorus Replacement - Follow Nurse / BPA Driven Protocol   Does not apply PRN Abebe Garzon DO        polyethylene glycol (MIRALAX) packet 17 g  17 g Oral Daily Reuben Garza APRN   17 g at 08/09/24 0904    Potassium Replacement - Follow Nurse / BPA Driven Protocol   Does not apply PRN Abebe Garzon DO        pregabalin (LYRICA) capsule 100 mg  100 mg Oral Nightly Reuben Garza APRN   100 mg at 08/09/24 2117    pregabalin (LYRICA) capsule 50 mg  50 mg Oral Daily Reuben Garza APRN   50 mg at 08/10/24 0940    rosuvastatin (CRESTOR) tablet 10 mg  10 mg Oral Nightly Reuben Garza APRN   10 mg at 08/09/24 2117    sodium bicarbonate tablet 650 mg  650 mg Oral TID Brian Lomas MD   650 mg at 08/10/24 1741    sodium chloride 0.9 % flush 10 mL  10 mL Intravenous Q12H Abebe Garzon DO   10 mL at 08/08/24 2023    sodium chloride 0.9 % flush 10 mL  10 mL Intravenous PRN Abebe Garzon DO        sodium chloride 0.9 % flush 10 mL  10 mL Intravenous Q12H Lamine Figueroa APRN   10 mL at 08/10/24 0947    sodium chloride 0.9 % flush 10 mL  10 mL Intravenous PRN Lamine Figueroa APRN        sodium chloride 0.9 % infusion 40 mL  40 mL Intravenous PRN Abebe Garzon DO        sodium chloride 0.9 % infusion 40 mL  40 mL Intravenous PRN Lamine Figueroa APRN           Past Medical History:  Past Medical History:   Diagnosis Date    Arthritis     Autonomic disease     CAD (coronary artery disease) 02/06/2017    Cervical radiculopathy 09/16/2021    Chronic constipation with acute exaccerbation 05/10/2021    Coronary artery disease     Degeneration of cervical intervertebral disc 08/11/2021    Diabetes mellitus     Diabetic foot ulcer 08/31/2020     Diabetic polyneuropathy associated with type 2 diabetes mellitus 01/18/2021    Elevated cholesterol     Gastroesophageal reflux disease 05/13/2019    Headache     HTN (hypertension), benign 05/03/2017    Hyperlipidemia     Hypertension     Mixed hyperlipidemia 02/07/2017    Multiple lung nodules 01/26/2020    5mm, 9 mm RLL identified 1/2020, not present 10/2019.    Myocardial infarction     Osteomyelitis 01/22/2020    Osteomyelitis of fifth toe of right foot 10/07/2019    Pancreatitis     Persistent insomnia 01/20/2020    Renal disorder     Sleep apnea 02/06/2017    Sleep apnea with use of continuous positive airway pressure (CPAP)     NON-COMPLIANT    Slow transit constipation 01/16/2019    Spinal stenosis in cervical region 09/16/2021    Vitamin D deficiency 03/02/2021       Past Surgical History:  Past Surgical History:   Procedure Laterality Date    ABDOMINAL SURGERY      AMPUTATION FOOT / TOE Left 10/2021    5th digit     ANTERIOR CERVICAL DISCECTOMY W/ FUSION N/A 08/05/2022    Procedure: CERVICAL DISCECTOMY ANTERIOR WITH FUSION C5-6 with possible plating of C5-7 with neuromonitoring and 1 c-arm;  Surgeon: Karel Soliz MD;  Location:  [facility] OR;  Service: Neurosurgery;  Laterality: N/A;    APPENDECTOMY      BACK SURGERY      CARDIAC CATHETERIZATION Left 02/08/2021    Procedure: Left Heart Cath w poss intervention left anatomical snuff box acess;  Surgeon: Omkar Charles DO;  Location:  [facility] CATH INVASIVE LOCATION;  Service: Cardiology;  Laterality: Left;    CARDIAC SURGERY      CATARACT EXTRACTION      CERVICAL SPINE SURGERY      COLONOSCOPY N/A 01/31/2017    Normal exam repeat in 5 years    COLONOSCOPY N/A 02/11/2019    Mild acute inflammation    COLONOSCOPY N/A 04/07/2024    2 areas at 10 and 20 cm with friability ulceration 2 clips placed at 20 cm and 4 clips at 10 cm poor prep normal mucosa,mild eroisions and ulcerations in visible vessels    COLONOSCOPY N/A 7/1/2024    Procedure:  COLONOSCOPY WITH ANESTHESIA;  Surgeon: Arsalan Lorenzo DO;  Location: [facility] ENDOSCOPY;  Service: Gastroenterology;  Laterality: N/A;  pre op constipation/diarrhea  post poor prep  pcp Del Shetty    COLONOSCOPY N/A 2024    Procedure: COLONOSCOPY WITH ANESTHESIA;  Surgeon: Agapito Christopher MD;  Location: [facility] ENDOSCOPY;  Service: Gastroenterology;  Laterality: N/A;  pre rectal bleeding  post poor prep  pcp Del Shetty MD    COLONOSCOPY W/ POLYPECTOMY  2014    Hyperplastic polyp    CORONARY ARTERY BYPASS GRAFT  10/2015    ENDOSCOPY  2011    Gastritis with hemorrhage    ENDOSCOPY N/A 2017    Normal exam    ENDOSCOPY N/A 2019    Gastritis    ENDOSCOPY N/A 2020    Non-erosive gastritis with hemorrhage    ENDOSCOPY N/A 02/10/2021    Esophagitis    ENDOSCOPY N/A 2024    There were esophageal mucosal changes suspicious for short-segment Low's esophagus present in the distal esophagus. The maximum longitudinal extent of these mucosal changes was 2 cm in length. Mucosa was biopsied with a cold forceps for histologyDistal esophagus, biopsies: Mild chronic active esophagogastritis. No evidence of intestinal metaplasia, dysplasia. Antrum, bx, Mild chronic gastritis    FOOT SURGERY Left     INCISION AND DRAINAGE OF WOUND Left 2007    spider bite       Family History  Family History   Problem Relation Age of Onset    Colon cancer Father     Heart disease Father     Colon cancer Sister     Colon polyps Sister     Alzheimer's disease Mother     Coronary artery disease Sister     Coronary artery disease Sister        Social History  Social History     Socioeconomic History    Marital status:    Tobacco Use    Smoking status: Former     Current packs/day: 0.00     Types: Cigarettes     Quit date:      Years since quittin.6    Smokeless tobacco: Never    Tobacco comments:     smoked [place] [employer]   Vaping Use    Vaping status: Never Used   Substance and  Sexual Activity    Alcohol use: No    Drug use: No    Sexual activity: Defer       Review of Systems:  History obtained from chart review and the patient  General ROS: No fever or chills  Respiratory ROS: No cough, shortness of breath, wheezing  Cardiovascular ROS: No chest pain or palpitations  Gastrointestinal ROS: No abdominal pain or melena  Genito-Urinary ROS: No dysuria or hematuria  Psych ROS: No anxiety and depression  14 point ROS reviewed with the patient and negative except as noted above and in the HPI unless unable to obtain.    Objective:  Patient Vitals for the past 24 hrs:   BP Temp Temp src Pulse Resp SpO2 Weight   08/10/24 1601 164/78 97.3 °F (36.3 °C) Oral 90 16 97 % --   08/10/24 1052 135/65 98 °F (36.7 °C) Oral 71 16 94 % --   08/10/24 0751 134/58 97.9 °F (36.6 °C) Oral 72 16 94 % --   08/10/24 0429 113/55 98.4 °F (36.9 °C) Oral 71 16 98 % (!) 136 kg (300 lb 14.4 oz)   08/10/24 0106 142/78 97.9 °F (36.6 °C) Oral 79 16 94 % --   08/09/24 1948 155/93 98.1 °F (36.7 °C) Oral 82 16 94 % --       Intake/Output Summary (Last 24 hours) at 8/10/2024 1750  Last data filed at 8/10/2024 1601  Gross per 24 hour   Intake 480 ml   Output 575 ml   Net -95 ml     General: awake/alert   Chest:  clear to auscultation bilaterally without respiratory distress  CVS: regular rate and rhythm  Abdominal: soft, nontender, positive bowel sounds, obese  Extremities: tr ble edema  Skin: warm and dry without rash with wounds as documented      Labs:  Results from last 7 days   Lab Units 08/10/24  0626 08/09/24 0518 08/08/24  0449   WBC 10*3/mm3 7.02 7.27 8.08   HEMOGLOBIN g/dL 10.1* 9.9* 9.6*   HEMATOCRIT % 32.1* 31.6* 30.5*   PLATELETS 10*3/mm3 271 247 242         Results from last 7 days   Lab Units 08/10/24  0626 08/09/24  0518 08/08/24  0449 08/06/24  0508 08/05/24  0444 08/04/24  0615   SODIUM mmol/L 142 141 141   < > 144 145   POTASSIUM mmol/L 3.8 4.1 3.8   < > 3.9 3.9   CHLORIDE mmol/L 109* 110* 109*   < > 112* 111*  "  CO2 mmol/L 23.0 22.0 21.0*   < > 21.0* 21.0*   BUN mg/dL 16 15 15   < > 20 24*   CREATININE mg/dL 1.48* 1.34* 1.42*   < > 1.47* 1.60*   CALCIUM mg/dL 8.2* 8.4* 8.5*   < > 8.3* 8.5*   EGFR mL/min/1.73 51.2* 57.7* 53.8*   < > 51.6* 46.6*   BILIRUBIN mg/dL  --   --   --   --  0.3 0.3   ALK PHOS U/L  --   --   --   --  102 95   ALT (SGPT) U/L  --   --   --   --  9 9   AST (SGOT) U/L  --   --   --   --  16 15   GLUCOSE mg/dL 101* 108* 110*   < > 89 97    < > = values in this interval not displayed.       Radiology:   Imaging Results (Last 72 Hours)       ** No results found for the last 72 hours. **            Culture:  No results found for: \"BLOODCX\", \"URINECX\", \"WOUNDCX\", \"MRSACX\", \"RESPCX\", \"STOOLCX\"      Assessment   Acute kidney injury/ATN  Chronic kidney disease stage IIIb  Hypertension  Diabetes type 2  Diabetic foot ulcer with sepsis  Anemia chronic kidney disease  Obesity  Metabolic acidosis     Plan:  Discussed with patient, nursing  Workup reviewed today  Monitor labs  Weaned IV fluids  ID / podiatry evaluation reviewed as current  Antibiotics per ID-finished ertapenem on 8/7        Willy Arteaga MD  8/10/2024  17:50 CDT        Electronically signed by Willy Arteaga MD at 08/10/24 1753       Alejandrina Barnett DO at 08/10/24 1147              [facility] Medicine Services  INPATIENT PROGRESS NOTE    Patient Name: Erick Luong  Date of Admission: 7/31/2024  Today's Date: 08/10/24  Length of Stay: 10  Primary Care Physician: Del Shetty MD    Subjective   Chief Complaint: diabetic foot ulcer.   HPI   Complains of foot hurts when he is up on it.  Has not been ambulatory yet today.  Denies any new problems  Awaiting nursing home placement      Review of Systems   All pertinent negatives and positives are as above. All other systems have been reviewed and are negative unless otherwise stated.     Objective    Temp:  [97.8 °F (36.6 °C)-98.4 °F (36.9 " °C)] 98 °F (36.7 °C)  Heart Rate:  [71-82] 71  Resp:  [16] 16  BP: (113-155)/(55-93) 134/58  Physical Exam  Vitals and nursing note reviewed.   Constitutional:       Appearance: He is obese.   HENT:      Head: Normocephalic and atraumatic.      Right Ear: External ear normal.      Left Ear: External ear normal.      Nose: Nose normal.      Mouth/Throat:      Mouth: Mucous membranes are moist.   Eyes:      Extraocular Movements: Extraocular movements intact.      Conjunctiva/sclera: Conjunctivae normal.      Pupils: Pupils are equal, round, and reactive to light.   Cardiovascular:      Rate and Rhythm: Normal rate and regular rhythm.      Pulses: Normal pulses.      Heart sounds: Normal heart sounds.   Pulmonary:      Effort: Pulmonary effort is normal.   Abdominal:      General: Bowel sounds are normal.      Palpations: Abdomen is soft.   Musculoskeletal:      Cervical back: Normal range of motion.      Comments: Moves all extremities   Skin:     General: Skin is warm and dry.      Capillary Refill: Capillary refill takes less than 2 seconds.      Comments: Dressing left foot/heel intact.   Neurological:      Mental Status: He is alert and oriented to person, place, and time.   Psychiatric:      Comments: Affect  is flat.             File Link    Scan on 8/7/2024 1702 by Aicha Smallwood RN: Wound Left lateral foot Diabetic Ulcer        Key Information    Document ID File Type Document Type Description   [id number] Image WOUND IMAGE Wound Left lateral foot Diabetic Ulcer     Import Information    Attached At Date Time User Dept   Encounter Level 8/7/2024  5:02 PM Aicha Smallwood RN  [facility]     Encounter    Hospital Encounter on 7/31/24             Results Review:  I have reviewed the labs, radiology results, and diagnostic studies.    Laboratory Data:   Results from last 7 days   Lab Units 08/10/24  0626 08/09/24  0518 08/08/24  0449   WBC 10*3/mm3 7.02 7.27 8.08   HEMOGLOBIN g/dL 10.1* 9.9* 9.6*    HEMATOCRIT % 32.1* 31.6* 30.5*   PLATELETS 10*3/mm3 271 257 242        Results from last 7 days   Lab Units 08/10/24  0626 08/09/24  0518 08/08/24 0449 08/06/24  0508 08/05/24  0444 08/04/24  0615   SODIUM mmol/L 142 141 141   < > 144 145   POTASSIUM mmol/L 3.8 4.1 3.8   < > 3.9 3.9   CHLORIDE mmol/L 109* 110* 109*   < > 112* 111*   CO2 mmol/L 23.0 22.0 21.0*   < > 21.0* 21.0*   BUN mg/dL 16 15 15   < > 20 24*   CREATININE mg/dL 1.48* 1.34* 1.42*   < > 1.47* 1.60*   CALCIUM mg/dL 8.2* 8.4* 8.5*   < > 8.3* 8.5*   BILIRUBIN mg/dL  --   --   --   --  0.3 0.3   ALK PHOS U/L  --   --   --   --  102 95   ALT (SGPT) U/L  --   --   --   --  9 9   AST (SGOT) U/L  --   --   --   --  16 15   GLUCOSE mg/dL 101* 108* 110*   < > 89 97    < > = values in this interval not displayed.       Culture Data:   Blood Culture   Date Value Ref Range Status   07/31/2024 No growth at 5 days  Final   07/31/2024 Staphylococcus, coagulase negative (C)  Final     Urine Culture   Date Value Ref Range Status   07/31/2024 >100,000 CFU/mL Escherichia coli ESBL (A)  Final     Wound Culture   Date Value Ref Range Status   08/02/2024 (A)  Final    Light growth (2+) Streptococcus agalactiae (Group B)     Comment:       This organism is considered to be universally susceptible to penicillin.  No further antibiotic testing will be performed. If Clindamycin or Erythromycin is the drug of choice, notify the laboratory within 7 days to request susceptibility testing.   08/02/2024 (A)  Final    Light growth (2+) Streptococcus agalactiae (Group B)     Comment:       This organism is considered to be universally susceptible to penicillin.  No further antibiotic testing will be performed. If Clindamycin or Erythromycin is the drug of choice, notify the laboratory within 7 days to request susceptibility testing.       Radiology Data:   Imaging Results (Last 24 Hours)       ** No results found for the last 24 hours. **            I have reviewed the patient's  current medications.     Assessment/Plan   Assessment  Active Hospital Problems    Diagnosis     **DKA, type 2, not at goal     E. coli UTI, ESBL     Atrial fibrillation     Chronic heart failure with preserved ejection fraction (HFpEF)     Chronic diastolic heart failure     GERD without esophagitis     Diabetic ulcer of left foot associated with type 2 diabetes mellitus        Treatment Plan  Continue current treatment of foot wound.  Continue current diabetic management.  Currently he is very well-controlled with medicine and diet.  Continue to await nursing home placement.  Currently waiting on pre-CERT.       Medical Decision Making  Number and Complexity of problems:   3 acute, high complexity problems  4+ chronic, moderate complexity problems     Differential Diagnosis: None     Conditions and Status        Condition is improving.     [facility] Data  External documents reviewed: Care Everywhere  Cardiac tracing (EKG, telemetry) interpretation: See HPI  Radiology interpretation: See HPI  Labs reviewed: See HPI  Any tests that were considered but not ordered: None     Decision rules/scores evaluated (example [id number], [place], etc): None     Discussed with: The patient     Care Planning  Shared decision making: Patient  Code status and discussions: Full code     Disposition  Social Determinants of Health that impact treatment or disposition: none     I expect the patient to be discharged to SNF  in 1-2 days.    Electronically signed by Alejandrina Barnett DO, 08/10/24, 11:47 CDT.      Electronically signed by Alejandrina Barnett DO at 08/10/24 1149       Consult Notes (last 48 hours)  Notes from 08/10/24 1006 through 24 1006   No notes of this type exist for this encounter.          Physical Therapy Notes (last 48 hours)        Claudia Prakash PTA at 08/10/24 1601  Version 1 of 1         Acute Care - Physical Therapy Treatment Note   [facility]     Patient Name: Erick Luong  : 1955  MRN:  [id number]  Today's Date: 8/10/2024      Visit Dx:     ICD-10-CM ICD-9-CM   1. Diabetic foot infection  E11.628 250.80    L08.9 686.9   2. Osteomyelitis of foot, unspecified laterality, unspecified type  M86.9 730.27   3. Diabetic ketoacidosis without coma associated with type 2 diabetes mellitus  E11.10 250.12   4. Elevated troponin  R79.89 790.6   5. Atrial fibrillation with RVR  I48.91 427.31   6. Confusion  R41.0 298.9   7. Impaired mobility [Z74.09]  Z74.09 799.89     Patient Active Problem List   Diagnosis    Obesity, unspecified obesity severity, unspecified obesity type    Essential hypertension    Type 2 diabetes mellitus with hyperglycemia, with long-term current use of insulin    Nonsmoker    Anemia due to chronic kidney disease    Class 3 severe obesity due to excess calories with body mass index (BMI) of 40.0 to 44.9 in adult    Anasarca    Sleep apnea with use of continuous positive airway pressure (CPAP)    Medically noncompliant    Diabetic ulcer of left foot associated with type 2 diabetes mellitus    Diabetic polyneuropathy associated with type 2 diabetes mellitus    Spinal stenosis in cervical region    Degeneration of cervical intervertebral disc    Cervical radiculopathy    Degeneration of lumbar or lumbosacral intervertebral disc    Cervical myelopathy    Bilateral carpal tunnel syndrome    CAD (coronary artery disease)    GERD without esophagitis    BPH without obstruction/lower urinary tract symptoms    Stage 3b chronic kidney disease    Chronic diastolic heart failure    Type 2 myocardial infarction due to heart failure    Left carpal tunnel syndrome    Syncope and collapse, recurrent episodes    Poorly-controlled hypertension    Rhinovirus    Peripheral vascular disease    Chronic kidney disease (CKD), stage IV (severe)    Diabetic foot infection    Sepsis    Epigastric pain    Chronic heart failure with preserved ejection fraction (HFpEF)    Sepsis due to methicillin resistant  Staphylococcus aureus (MRSA) with encephalopathy without septic shock    Slow transit constipation    CHF exacerbation    CHF (congestive heart failure), NYHA class I, acute on chronic, combined    Rectal bleeding    Acute on chronic heart failure with preserved ejection fraction (HFpEF)    DKA, type 2, not at goal    Atrial fibrillation    E. coli UTI, ESBL     Past Medical History:   Diagnosis Date    Arthritis     Autonomic disease     CAD (coronary artery disease) 02/06/2017    Cervical radiculopathy 09/16/2021    Chronic constipation with acute exaccerbation 05/10/2021    Coronary artery disease     Degeneration of cervical intervertebral disc 08/11/2021    Diabetes mellitus     Diabetic foot ulcer 08/31/2020    Diabetic polyneuropathy associated with type 2 diabetes mellitus 01/18/2021    Elevated cholesterol     Gastroesophageal reflux disease 05/13/2019    Headache     HTN (hypertension), benign 05/03/2017    Hyperlipidemia     Hypertension     Mixed hyperlipidemia 02/07/2017    Multiple lung nodules 01/26/2020    5mm, 9 mm RLL identified 1/2020, not present 10/2019.    Myocardial infarction     Osteomyelitis 01/22/2020    Osteomyelitis of fifth toe of right foot 10/07/2019    Pancreatitis     Persistent insomnia 01/20/2020    Renal disorder     Sleep apnea 02/06/2017    Sleep apnea with use of continuous positive airway pressure (CPAP)     NON-COMPLIANT    Slow transit constipation 01/16/2019    Spinal stenosis in cervical region 09/16/2021    Vitamin D deficiency 03/02/2021     Past Surgical History:   Procedure Laterality Date    ABDOMINAL SURGERY      AMPUTATION FOOT / TOE Left 10/2021    5th digit     ANTERIOR CERVICAL DISCECTOMY W/ FUSION N/A 08/05/2022    Procedure: CERVICAL DISCECTOMY ANTERIOR WITH FUSION C5-6 with possible plating of C5-7 with neuromonitoring and 1 c-arm;  Surgeon: Karel Soliz MD;  Location: [facility];  Service: Neurosurgery;  Laterality: N/A;    APPENDECTOMY      BACK SURGERY       CARDIAC CATHETERIZATION Left 02/08/2021    Procedure: Left Heart Cath w poss intervention left anatomical snuff box acess;  Surgeon: Omkar Charles DO;  Location: [facility] CATH INVASIVE LOCATION;  Service: Cardiology;  Laterality: Left;    CARDIAC SURGERY      CATARACT EXTRACTION      CERVICAL SPINE SURGERY      COLONOSCOPY N/A 01/31/2017    Normal exam repeat in 5 years    COLONOSCOPY N/A 02/11/2019    Mild acute inflammation    COLONOSCOPY N/A 04/07/2024    2 areas at 10 and 20 cm with friability ulceration 2 clips placed at 20 cm and 4 clips at 10 cm poor prep normal mucosa,mild eroisions and ulcerations in visible vessels    COLONOSCOPY N/A 7/1/2024    Procedure: COLONOSCOPY WITH ANESTHESIA;  Surgeon: Arsalan Lorenzo DO;  Location: [facility] ENDOSCOPY;  Service: Gastroenterology;  Laterality: N/A;  pre op constipation/diarrhea  post poor prep  pcp Dle Shetty    COLONOSCOPY N/A 7/2/2024    Procedure: COLONOSCOPY WITH ANESTHESIA;  Surgeon: Agapito Christopher MD;  Location: [facility] ENDOSCOPY;  Service: Gastroenterology;  Laterality: N/A;  pre rectal bleeding  post poor prep  pcp Del Shetty MD    COLONOSCOPY W/ POLYPECTOMY  03/04/2014    Hyperplastic polyp    CORONARY ARTERY BYPASS GRAFT  10/2015    ENDOSCOPY  04/13/2011    Gastritis with hemorrhage    ENDOSCOPY N/A 05/05/2017    Normal exam    ENDOSCOPY N/A 02/11/2019    Gastritis    ENDOSCOPY N/A 09/01/2020    Non-erosive gastritis with hemorrhage    ENDOSCOPY N/A 02/10/2021    Esophagitis    ENDOSCOPY N/A 04/11/2024    There were esophageal mucosal changes suspicious for short-segment Low's esophagus present in the distal esophagus. The maximum longitudinal extent of these mucosal changes was 2 cm in length. Mucosa was biopsied with a cold forceps for histologyDistal esophagus, biopsies: Mild chronic active esophagogastritis. No evidence of intestinal metaplasia, dysplasia. Antrum, bx, Mild chronic gastritis    FOOT SURGERY Left      INCISION AND DRAINAGE OF WOUND Left 09/2007    spider bite     PT Assessment (Last 12 Hours)       PT Evaluation and Treatment       Row Name 08/10/24 1548          Physical Therapy Time and Intention    Subjective Information complains of;pain  -KJ     Document Type therapy note (daily note)  -KJ     Mode of Treatment physical therapy  -KJ       Row Name 08/10/24 1548          General Information    Existing Precautions/Restrictions fall  -KJ       Row Name 08/10/24 1548          Pain    Pretreatment Pain Rating 7/10  -KJ     Posttreatment Pain Rating 7/10  -KJ     Pain Location - Side/Orientation Left  -KJ     Pain Location - foot  -KJ       Row Name 08/10/24 1548          Bed Mobility    Supine-Sit [place] (Bed Mobility) standby assist  -KJ       Row Name 08/10/24 1548          Sit-Stand Transfer    Sit-Stand [place] (Transfers) standby assist  -KJ       Row Name 08/10/24 1548          Stand-Sit Transfer    Stand-Sit [place] (Transfers) standby assist  -KJ       Row Name 08/10/24 1548          Gait/Stairs (Locomotion)    [place] Level (Gait) contact guard  -KJ     Assistive Device (Gait) walker, front-wheeled  -KJ     Distance in Feet (Gait) 50  x 2  -KJ     Deviations/Abnormal Patterns (Gait) gait speed decreased  -KJ       Row Name 08/10/24 1548          Motor Skills    Therapeutic Exercise aerobic  -KJ       Row Name 08/10/24 1548          Aerobic Exercise    Comment, Aerobic Exercise (Therapeutic Exercise) AROM x 20 reps  -KJ       Row Name             Wound 08/22/23 0140 Left posterior plantar    Wound - Properties Group Placement Date: 08/22/23  -AM Placement Time: 0140  -AM Present on Original Admission: Y  -AM Side: Left  -AM Orientation: posterior  -AM Location: plantar  -AM    Retired Wound - Properties Group Placement Date: 08/22/23  -AM Placement Time: 0140  -AM Present on Original Admission: Y  -AM Side: Left  -AM Orientation: posterior  -AM Location: plantar  -AM    Retired Wound  - Properties Group Date first assessed: 08/22/23  -AM Time first assessed: 0140  -AM Present on Original Admission: Y  -AM Side: Left  -AM Location: plantar  -AM      Row Name             Wound Left lateral foot Diabetic Ulcer    Wound - Properties Group Side: Left  -MH Orientation: lateral  -MH Location: foot  -JACIEL, 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;diabetic foot ulcer/gangrene  Primary Wound Type: Diabetic ulc  -JACIEL Wound Outcome: Amputation  -JACIEL Stage, Pressure Injury : other (see comments)  -JACIEL, full thickness starting 10/20/21     Retired Wound - Properties Group Side: Left  -MH Orientation: lateral  -MH Location: foot  -JACIEL, 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;diabetic foot ulcer/gangrene  Primary Wound Type: Diabetic ulc  -JACIEL Stage, Pressure Injury : other (see comments)  -JACIEL, full thickness starting 10/20/21  Wound Outcome: Amputation  -JACIEL    Retired Wound - Properties Group Side: Left  -MH Location: foot  -JACIEL, 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;diabetic foot ulcer/gangrene  Primary Wound Type: Diabetic ulc  -JACIEL Wound Outcome: Amputation  -JACIEL      Row Name             Wound 06/15/23 0102 Left anterior plantar    Wound - Properties Group Placement Date: 06/15/23  -SM Placement Time: 0102 -SM Side: Left  -SM Orientation: anterior  -SM Location: plantar  -SM    Retired Wound - Properties Group Placement Date: 06/15/23  -SM Placement Time: 0102 -SM Side: Left  -SM Orientation: anterior  -SM Location: plantar  -SM    Retired Wound - Properties Group Date first assessed: 06/15/23  -SM Time first assessed: 0102 -SM Side: Left  -SM Location: plantar  -SM      Row Name 08/10/24 1548          Positioning and Restraints    Pre-Treatment Position in bed  -KJ     Post Treatment Position bed  -KJ     In Bed call light within reach  -KJ               User Key  (r) = Recorded By, (t) = Taken By, (c) = Cosigned By      Initials Name Provider Type    Claudia Maddox, PARUL Physical Therapist Assistant    Yuliana Lisa RN Registered Nurse    MIGUEL ÁNGEL  Haase, Mallory L, RN Registered Nurse    Faviola Kunz, RN Registered Nurse    Michelle Diaz RN Registered Nurse                    Physical Therapy Education       Title: PT OT SLP Therapies (In Progress)       Topic: Physical Therapy (Done)       Point: Mobility training (Done)       Learning Progress Summary             Patient Acceptance, E,D, DU,VU by  at 8/4/2024 1457    Comment: benefits of PT and POC, call for A OOB                         Point: Precautions (Done)       Learning Progress Summary             Patient Acceptance, E,D, DU,VU by  at 8/4/2024 1457    Comment: benefits of PT and POC, call for A OOB                                         User Key       Initials Effective Dates Name Provider Type Discipline     02/03/23 -  Daniel Garcia, PT Physical Therapist PT                  PT Recommendation and Plan             Time Calculation:    PT Charges       Row Name 08/10/24 1606             Time Calculation    Start Time 1548  -KJ      Stop Time 1612  -KJ      Time Calculation (min) 24 min  -KJ      PT Received On 08/10/24  -KJ      PT Goal Re-Cert Due Date 08/14/24  -KJ         Time Calculation- PT    Total Timed Code Minutes- PT 24 minute(s)  -KJ                User Key  (r) = Recorded By, (t) = Taken By, (c) = Cosigned By      Initials Name Provider Type     Claudia Prakash PTA Physical Therapist Assistant                  Therapy Charges for Today       Code Description Service Date Service Provider Modifiers Qty    46949798194 HC GAIT TRAINING EA 15 MIN 8/10/2024 Claudia Prakash PTA GP 1    99664960302 HC PT THER PROC EA 15 MIN 8/10/2024 Claudia Prakash PTA GP 1            PT G-Codes  Outcome Measure Options: AM-PAC 6 Clicks Daily Activity (OT)  AM-PAC 6 Clicks Score (PT): 19  AM-PAC 6 Clicks Score (OT): 15    Claudia Prakash PTA  8/10/2024      Electronically signed by Claudia Prakash PTA at 08/10/24 1601       Occupational Therapy Notes (last 48 hours)  Notes from  08/10/24 1006 through 08/12/24 1006   No notes exist for this encounter.

## 2024-08-12 NOTE — THERAPY DISCHARGE NOTE
Acute Care - Occupational Therapy Discharge Summary  [facility]     Patient Name: Erick Luong  : 1955  MRN: [id number]    Today's Date: 2024       Date of Referral to OT: 24         Admit Date: 2024        OT Recommendation and Plan    Visit Dx:    ICD-10-CM ICD-9-CM   1. Diabetic foot infection  E11.628 250.80    L08.9 686.9   2. Osteomyelitis of foot, unspecified laterality, unspecified type  M86.9 730.27   3. Diabetic ketoacidosis without coma associated with type 2 diabetes mellitus  E11.10 250.12   4. Elevated troponin  R79.89 790.6   5. Atrial fibrillation with RVR  I48.91 427.31   6. Confusion  R41.0 298.9   7. Impaired mobility [Z74.09]  Z74.09 799.89   8. Diabetic polyneuropathy associated with type 2 diabetes mellitus  E11.42 250.60     357.2                OT Rehab Goals       Row Name 24 1454             Transfer Goal 1 (OT)    Activity/Assistive Device (Transfer Goal 1, OT) sit-to-stand/stand-to-sit;toilet;shower chair;tub  -CH      [place] Level/Cues Needed (Transfer Goal 1, OT) supervision required  -CH      Time Frame (Transfer Goal 1, OT) long term goal (LTG)  -CH      Progress/Outcome (Transfer Goal 1, OT) goal partially met  -CH         Dressing Goal 1 (OT)    Activity/Device (Dressing Goal 1, OT) dressing skills, all  -CH      [place]/Cues Needed (Dressing Goal 1, OT) minimum assist (75% or more patient effort)  -CH      Time Frame (Dressing Goal 1, OT) long term goal (LTG)  -CH      Strategies/Barriers (Dressing Goal 1, OT) AE PRN  -CH      Progress/Outcome (Dressing Goal 1, OT) goal not met  -CH         Strength Goal 1 (OT)    Strength Goal 1 (OT) Pt will increase BUE strength to 4+/5 for increased safety & indep with ADLs & fxnal transfers  -CH      Time Frame (Strength Goal 1, OT) long term goal (LTG)  -CH      Progress/Outcome (Strength Goal 1, OT) goal not met  -CH                User Key  (r) = Recorded By, (t) = Taken By, (c) = Cosigned By       Initials Name Provider Type Discipline     Marge Yuen OTR/L Occupational Therapist OT                        Timed Therapy Charges  Total Units: 3      Suggested Charges  Total Units: 3      Procedure Name Documented Minutes Units Code    HC OT THER PROC EA 15 MIN 24 2   45987 (CPT®)      HC OT THERAPEUTIC ACT EA 15 MIN 18 1   29236 (CPT®)                 Documented Minutes  Total Minutes: 42      Therapy Provided Minutes    21918 - OT Therapeutic Exercise Minutes 24    76093 - OT Therapeutic Activity Minutes 18                        OT Discharge Summary  Anticipated Discharge Disposition (OT): skilled nursing facility  Reason for Discharge: Discharge from facility  Outcomes Achieved: Refer to plan of care for updates on goals achieved  Discharge Destination: SNF      ALEXANDER Michelle/EDVIN  8/12/2024

## 2024-08-12 NOTE — PROGRESS NOTES
Nephrology ([facility]) Progress Note      Patient:  Erick Luong  YOB: 1955  Date of Service: 8/12/2024  MRN: [id number]   Acct: [id number]   Primary Care Physician: Del Shetty MD  Advance Directive:   Code Status and Medical Interventions: CPR (Attempt to Resuscitate); Full Support   Ordered at: 08/01/24 0053     Level Of Support Discussed With:    Patient     Code Status (Patient has no pulse and is not breathing):    CPR (Attempt to Resuscitate)     Medical Interventions (Patient has pulse or is breathing):    Full Support     Admit Date: 7/31/2024       Hospital Day: 12  Referring Provider: Abebe Garzon DO      Patient personally seen and examined.  Complete chart including Consults, Notes, Operative Reports, Labs, Cardiology, and Radiology studies reviewed as able.        Subjective:  Erick Luong is a 69 y.o. male for whom we were consulted for evaluation and treatment of acute kidney injury. Baseline chronic kidney disease stage 3b. Baseline creatinine approximately 1.5-1.8. Follows in our office.  History of poorly controlled type 2 diabetes, hypertension, coronary artery disease, diabetic foot ulcer, obesity.  On 7/31 patient was found wandering at home, confused. Brought to ER for evaluation. Has a nonhealing left foot diabetic ulcer with serosanguineous drainage. Blood glucose level was >700 with A1c >12%. Initial creatinine was 2.67 and has steadily improved since he was admitted. Nephrology consulted on 8/01. Hospital course remarkable for improving renal function and improved blood glucose levels. Moved to medical floor    Today is awake and alert. No new complaints or new overnight issues. Urine output nonoliguric    Allergies:  Cefepime, Bactrim [sulfamethoxazole-trimethoprim], Vancomycin, Zolpidem, and Metronidazole    Home Meds:  Medications Prior to Admission   Medication Sig Dispense Refill Last Dose    ascorbic acid (VITAMIN C) 1000 MG  tablet Take 1 tablet by mouth Daily. 30 tablet 3     bumetanide (BUMEX) 1 MG tablet Take 1 tablet by mouth 2 (Two) Times a Day for 30 days. 60 tablet 0     busPIRone (BUSPAR) 10 MG tablet Take 1 tablet by mouth 2 (Two) Times a Day.       calcitriol (ROCALTROL) 0.5 MCG capsule Take 1 capsule by mouth Daily. 90 capsule 4     carvedilol (COREG) 3.125 MG tablet Take 1 tablet twice a day by oral route. 60 tablet 4     donepezil (ARICEPT) 10 MG tablet Take 1 tablet by mouth Daily. 90 tablet 1     DULoxetine (CYMBALTA) 60 MG capsule Take 1 capsule by mouth Daily. 90 capsule 4     famotidine (PEPCID) 20 MG tablet Take 1 tablet twice a day by oral route. 180 tablet 4     Insulin Glargine (Lantus SoloStar) 100 UNIT/ML injection pen Inject 20 Units under the skin into the appropriate area as directed every night at bedtime. 15 mL 3     Insulin Regular Human, Conc, (HumuLIN R) 500 UNIT/ML solution pen-injector CONCENTRATED injection Inject 40 Units under the skin into the appropriate area as directed 2 (Two) Times a Day Before Meals. Inject 40 units under the skin in the the appropriate area with regular meals AND 40 units with large meals.       Iron-Vitamin C (Vitron-C)  MG tablet Take 1 tablet twice a day by oral route. 60 tablet 2     levocetirizine (XYZAL) 5 MG tablet Take 1 tablet by mouth every day at bedtime. 30 tablet 2     melatonin 3 MG tablet Take 2 tablets by mouth At Night As Needed for Sleep. 30 tablet 0     oxyCODONE (ROXICODONE) 10 MG tablet Take 1 tablet by mouth 2 (Two) Times a Day As Needed. Must last 30 days per md. 55 tablet 0     pantoprazole (PROTONIX) 40 MG EC tablet Take 1 tablet by mouth Every 12 (Twelve) Hours. 180 tablet 4     pregabalin (LYRICA) 100 MG capsule Take 1 capsule by mouth Daily.       pregabalin (LYRICA) 100 MG capsule Take 2 capsules by mouth every night at bedtime.       rosuvastatin (CRESTOR) 10 MG tablet Take 1 tablet by mouth Every Night. 30 tablet 2     sucralfate (CARAFATE)  1 g tablet Take 1 tablet by mouth 4 (Four) Times a Day before meals. 360 tablet 1     Diclofenac Sodium (VOLTAREN) 1 % gel gel Apply 2 g topically to the appropriate area as directed 4 (Four) Times a Day As Needed. 300 g 11     nitroglycerin (NITROSTAT) 0.4 MG SL tablet Place 1 tablet under the tongue Every 5 (Five) Minutes As Needed for Chest Pain. Take no more than 3 doses in 15 minutes.       polyethylene glycol (MIRALAX) 17 GM/SCOOP powder Take 17 g by mouth Daily As Needed (constipation).       sennosides-docusate (PERICOLACE) 8.6-50 MG per tablet Take 1 tablet by mouth Every Night. Obtain OTC          Medicines:  Current Facility-Administered Medications   Medication Dose Route Frequency Provider Last Rate Last Admin    ascorbic acid (VITAMIN C) tablet 1,000 mg  1,000 mg Oral Daily Reuben Garza APRN   1,000 mg at 08/12/24 0839    sennosides-docusate (PERICOLACE) 8.6-50 MG per tablet 2 tablet  2 tablet Oral BID Lamine Figueroa APRN   2 tablet at 08/12/24 0839    And    polyethylene glycol (MIRALAX) packet 17 g  17 g Oral Daily PRN Lamine Figueroa APRN        And    bisacodyl (DULCOLAX) EC tablet 5 mg  5 mg Oral Daily PRN Lamine Figueroa APRN        And    bisacodyl (DULCOLAX) suppository 10 mg  10 mg Rectal Daily PRN Lamine Figueroa APRN   10 mg at 08/02/24 0941    Calcium Replacement - Follow Nurse / BPA Driven Protocol   Does not apply PRN Abebe Garzon DO        dextrose (D50W) (25 g/50 mL) IV injection 10-50 mL  10-50 mL Intravenous Q15 Min PRN Abebe Garzon DO        dextrose (D50W) (25 g/50 mL) IV injection 25 g  25 g Intravenous Q15 Min PRN Lamine Figueroa APRN        dextrose (GLUTOSE) oral gel 15 g  15 g Oral Q15 Min PRN Lamine Figueroa APRN        donepezil (ARICEPT) tablet 10 mg  10 mg Oral Daily Reuben Garza APRN   10 mg at 08/12/24 0840    DULoxetine (CYMBALTA) DR capsule 60 mg  60 mg Oral Daily Reuben Garza APRN   60 mg at 08/12/24 0839    Enoxaparin Sodium  (LOVENOX) syringe 135 mg  1 mg/kg Subcutaneous Q12H Abebe Garzon DO   135 mg at 08/12/24 0840    famotidine (PEPCID) tablet 20 mg  20 mg Oral Q12H Reuben Garza APRN   20 mg at 08/12/24 0840    glucagon (GLUCAGEN) injection 1 mg  1 mg Intramuscular Q15 Min PRN Lamine Figueroa APRN        insulin glargine (LANTUS, SEMGLEE) injection 10 Units  10 Units Subcutaneous Daily Keren Jamison MD   10 Units at 08/12/24 0840    insulin regular (humuLIN R,novoLIN R) injection 3-14 Units  3-14 Units Subcutaneous 4x Daily AC & at Bedtime Keren Jamison MD   3 Units at 08/11/24 2130    ipratropium-albuterol (DUO-NEB) nebulizer solution 3 mL  3 mL Nebulization Q4H PRN Alejandrina Barnett DO   3 mL at 08/11/24 1906    Magnesium Standard Dose Replacement - Follow Nurse / BPA Driven Protocol   Does not apply PRN Abebe Garzon DO        melatonin tablet 2.5 mg  2.5 mg Oral Nightly Reuben Garza APRN   2.5 mg at 08/11/24 2129    nitroglycerin (NITROSTAT) SL tablet 0.4 mg  0.4 mg Sublingual Q5 Min PRN Lamine Figueroa APRN        ondansetron (ZOFRAN) injection 4 mg  4 mg Intravenous Q6H PRN Payam Keyes DO   4 mg at 08/11/24 2036    oxyCODONE-acetaminophen (PERCOCET) 5-325 MG per tablet 1 tablet  1 tablet Oral Q6H PRN Garrett Alicia MD   1 tablet at 08/12/24 0847    pantoprazole (PROTONIX) EC tablet 40 mg  40 mg Oral Q12H Reuben Garza APRN   40 mg at 08/12/24 0839    Pharmacy to Dose enoxaparin (LOVENOX)   Does not apply Continuous PRN Abebe Garzon DO        Phosphorus Replacement - Follow Nurse / BPA Driven Protocol   Does not apply PRN Abebe Garzon DO        polyethylene glycol (MIRALAX) packet 17 g  17 g Oral Daily Reuben Garza APRN   17 g at 08/09/24 0904    Potassium Replacement - Follow Nurse / BPA Driven Protocol   Does not apply PRN Abebe Garzon DO        pregabalin (LYRICA) capsule 100 mg  100 mg Oral Nightly Reuben Garza APRN   100 mg at 08/11/24  2130    pregabalin (LYRICA) capsule 50 mg  50 mg Oral Daily Reuben Garza APRN   50 mg at 08/12/24 0839    rosuvastatin (CRESTOR) tablet 10 mg  10 mg Oral Nightly Reuben Garza APRN   10 mg at 08/11/24 2129    sodium bicarbonate tablet 650 mg  650 mg Oral TID Brian Lomas MD   650 mg at 08/12/24 0839    sodium chloride 0.9 % flush 10 mL  10 mL Intravenous Q12H Abebe Garzon,    10 mL at 08/12/24 0840    sodium chloride 0.9 % flush 10 mL  10 mL Intravenous PRN Abebe Garzon,         sodium chloride 0.9 % flush 10 mL  10 mL Intravenous Q12H Lamine Figueroa APRN   10 mL at 08/12/24 0840    sodium chloride 0.9 % flush 10 mL  10 mL Intravenous PRN Lamine Figueroa APRN        sodium chloride 0.9 % infusion 40 mL  40 mL Intravenous PRN Abebe Garzon,         sodium chloride 0.9 % infusion 40 mL  40 mL Intravenous PRN Lamine Figueroa APRN           Past Medical History:  Past Medical History:   Diagnosis Date    Arthritis     Autonomic disease     CAD (coronary artery disease) 02/06/2017    Cervical radiculopathy 09/16/2021    Chronic constipation with acute exaccerbation 05/10/2021    Coronary artery disease     Degeneration of cervical intervertebral disc 08/11/2021    Diabetes mellitus     Diabetic foot ulcer 08/31/2020    Diabetic polyneuropathy associated with type 2 diabetes mellitus 01/18/2021    Elevated cholesterol     Gastroesophageal reflux disease 05/13/2019    Headache     HTN (hypertension), benign 05/03/2017    Hyperlipidemia     Hypertension     Mixed hyperlipidemia 02/07/2017    Multiple lung nodules 01/26/2020    5mm, 9 mm RLL identified 1/2020, not present 10/2019.    Myocardial infarction     Osteomyelitis 01/22/2020    Osteomyelitis of fifth toe of right foot 10/07/2019    Pancreatitis     Persistent insomnia 01/20/2020    Renal disorder     Sleep apnea 02/06/2017    Sleep apnea with use of continuous positive airway pressure (CPAP)     NON-COMPLIANT    Slow transit  constipation 01/16/2019    Spinal stenosis in cervical region 09/16/2021    Vitamin D deficiency 03/02/2021       Past Surgical History:  Past Surgical History:   Procedure Laterality Date    ABDOMINAL SURGERY      AMPUTATION FOOT / TOE Left 10/2021    5th digit     ANTERIOR CERVICAL DISCECTOMY W/ FUSION N/A 08/05/2022    Procedure: CERVICAL DISCECTOMY ANTERIOR WITH FUSION C5-6 with possible plating of C5-7 with neuromonitoring and 1 c-arm;  Surgeon: Karel Soliz MD;  Location: [facility] OR;  Service: Neurosurgery;  Laterality: N/A;    APPENDECTOMY      BACK SURGERY      CARDIAC CATHETERIZATION Left 02/08/2021    Procedure: Left Heart Cath w poss intervention left anatomical snuff box acess;  Surgeon: Omkar Charles DO;  Location: [facility] CATH INVASIVE LOCATION;  Service: Cardiology;  Laterality: Left;    CARDIAC SURGERY      CATARACT EXTRACTION      CERVICAL SPINE SURGERY      COLONOSCOPY N/A 01/31/2017    Normal exam repeat in 5 years    COLONOSCOPY N/A 02/11/2019    Mild acute inflammation    COLONOSCOPY N/A 04/07/2024    2 areas at 10 and 20 cm with friability ulceration 2 clips placed at 20 cm and 4 clips at 10 cm poor prep normal mucosa,mild eroisions and ulcerations in visible vessels    COLONOSCOPY N/A 7/1/2024    Procedure: COLONOSCOPY WITH ANESTHESIA;  Surgeon: Arsalan Lorenzo DO;  Location: [facility] ENDOSCOPY;  Service: Gastroenterology;  Laterality: N/A;  pre op constipation/diarrhea  post poor prep  pcp Del Shetty    COLONOSCOPY N/A 7/2/2024    Procedure: COLONOSCOPY WITH ANESTHESIA;  Surgeon: Agapito Christopher MD;  Location: [facility] ENDOSCOPY;  Service: Gastroenterology;  Laterality: N/A;  pre rectal bleeding  post poor prep  pcp Del Shetty MD    COLONOSCOPY W/ POLYPECTOMY  03/04/2014    Hyperplastic polyp    CORONARY ARTERY BYPASS GRAFT  10/2015    ENDOSCOPY  04/13/2011    Gastritis with hemorrhage    ENDOSCOPY N/A 05/05/2017    Normal exam    ENDOSCOPY N/A 02/11/2019     Gastritis    ENDOSCOPY N/A 2020    Non-erosive gastritis with hemorrhage    ENDOSCOPY N/A 02/10/2021    Esophagitis    ENDOSCOPY N/A 2024    There were esophageal mucosal changes suspicious for short-segment Low's esophagus present in the distal esophagus. The maximum longitudinal extent of these mucosal changes was 2 cm in length. Mucosa was biopsied with a cold forceps for histologyDistal esophagus, biopsies: Mild chronic active esophagogastritis. No evidence of intestinal metaplasia, dysplasia. Antrum, bx, Mild chronic gastritis    FOOT SURGERY Left     INCISION AND DRAINAGE OF WOUND Left 2007    spider bite       Family History  Family History   Problem Relation Age of Onset    Colon cancer Father     Heart disease Father     Colon cancer Sister     Colon polyps Sister     Alzheimer's disease Mother     Coronary artery disease Sister     Coronary artery disease Sister        Social History  Social History     Socioeconomic History    Marital status:    Tobacco Use    Smoking status: Former     Current packs/day: 0.00     Types: Cigarettes     Quit date:      Years since quittin.6    Smokeless tobacco: Never    Tobacco comments:     smoked in highschool   Vaping Use    Vaping status: Never Used   Substance and Sexual Activity    Alcohol use: No    Drug use: No    Sexual activity: Defer       Review of Systems:  History obtained from chart review and the patient  General ROS: No fever or chills  Respiratory ROS: No cough, shortness of breath, wheezing  Cardiovascular ROS: No chest pain or palpitations  Gastrointestinal ROS: No abdominal pain or melena  Genito-Urinary ROS: No dysuria or hematuria  Psych ROS: No anxiety and depression  14 point ROS reviewed with the patient and negative except as noted above and in the HPI unless unable to obtain.    Objective:  Patient Vitals for the past 24 hrs:   BP Temp Temp src Pulse Resp SpO2   24 0801 148/72 98.1 °F (36.7 °C) Oral 78  18 94 %   08/12/24 0400 120/59 97.8 °F (36.6 °C) Oral 69 18 95 %   08/11/24 2320 137/82 98.2 °F (36.8 °C) Oral 83 18 96 %   08/11/24 1919 154/73 97.8 °F (36.6 °C) Oral 88 16 97 %   08/11/24 1906 -- -- -- 84 16 95 %   08/11/24 1849 145/76 -- -- 83 -- 100 %   08/11/24 1545 136/72 97.6 °F (36.4 °C) Oral 76 16 95 %   08/11/24 1100 157/88 97.7 °F (36.5 °C) Oral 83 16 100 %       Intake/Output Summary (Last 24 hours) at 8/12/2024 1018  Last data filed at 8/12/2024 0900  Gross per 24 hour   Intake 480 ml   Output 1425 ml   Net -945 ml       General: awake/alert   Chest:  clear to auscultation bilaterally without respiratory distress  CVS: regular rate and rhythm  Abdominal: soft, nontender, positive bowel sounds  Extremities: no cyanosis or edema  Skin:  left foot ulcer and warm and dry without rash      Labs:  Results from last 7 days   Lab Units 08/12/24  0609 08/11/24  0537 08/10/24  0626   WBC 10*3/mm3 6.75 6.60 7.02   HEMOGLOBIN g/dL 10.1* 10.1* 10.1*   HEMATOCRIT % 32.6* 31.3* 32.1*   PLATELETS 10*3/mm3 261 248 271         Results from last 7 days   Lab Units 08/12/24  0609 08/11/24  0537 08/10/24  0626   SODIUM mmol/L 143 140 142   POTASSIUM mmol/L 3.9 4.0 3.8   CHLORIDE mmol/L 109* 110* 109*   CO2 mmol/L 25.0 22.0 23.0   BUN mg/dL 17 17 16   CREATININE mg/dL 1.47* 1.43* 1.48*   CALCIUM mg/dL 8.5* 8.3* 8.2*   EGFR mL/min/1.73 51.6* 53.4* 51.2*   GLUCOSE mg/dL 148* 133* 101*       Radiology:   Imaging Results (Last 72 Hours)       ** No results found for the last 72 hours. **            Culture:  Blood Culture   Date Value Ref Range Status   07/31/2024 No growth at 24 hours  Preliminary   07/31/2024 Staphylococcus, coagulase negative (C)  Final         Assessment    Acute kidney injury, ATN--resolved  Baseline chronic kidney disease stage 3b  Type 2 diabetes  Hypertension  Sepsis related to diabetic foot ulcer  Anemia of CKD  Obesity     Plan:    Renal function stable  Monitor labs  Noted ongoing work regarding  discharge disposition. OK for discharge from renal standpoint when OK with others. Follow up in our office in 1-2 weeks.      Stewart Alvarez, APRN  8/12/2024  10:18 CDT

## 2024-08-12 NOTE — TELEPHONE ENCOUNTER
Message sent to Dr. Garrett:  [2:40 PM] Danielle Heath ([facility])    I just took a call from [place] with [facility] asking about a follow-up appt on Erick Luong.       [2:50 PM] Julia Adrian ([facility])  He can follow up prn     [2:50 PM] Danielle Heath ([facility])  OK, I will let them know. ND     I called Aicha and informed her Dr. Garrett said he can follow-up as needed.

## 2024-08-12 NOTE — CASE MANAGEMENT/SOCIAL WORK
Continued Stay Note  [facility]     Patient Name: Erick Luong  MRN: [id number]  Today's Date: 8/12/2024    Admit Date: 7/31/2024    Plan: [facility] pending precert   Discharge Plan       Row Name 08/12/24 1003       Plan    Plan [facility] pending precert    Plan Comments YIMI has reached out to Lashell in admissions at [facility] this am to inquire about status of precert.  Lashell has advised precert is still pending.  Updates faxed to Lashell at [facility].                   Discharge Codes    No documentation.                 Expected Discharge Date and Time       Expected Discharge Date Expected Discharge Time    Aug 9, 2024               SUZIE Hernandez

## 2024-08-12 NOTE — PROGRESS NOTES
[facility] - PODIATRY    Today's Date: 08/12/24     Patient Name: Erick Luong  MRN: [id number]  CSN: [id number]  PCP: Del Shetty MD  Referring Provider: Abebe Garzon DO  Attending Provider: Payam Keeys DO  Length of Stay: 12    SUBJECTIVE   Chief Complaint: Left foot wounds    HPI: Erick Luong, a 69 y.o.male, who is being followed by podiatry for left foot wounds.  Patient reports nursing has been changing his dressing regularly.  Patient reports new wounds to the right toes and small blister to the right lateral distal foot.  Patient reports these areas are from rubbing in the bed.  States that he will likely be discharged today to a skilled nursing facility. Denies significant overnight events.    Past Medical History:   Diagnosis Date    Arthritis     Autonomic disease     CAD (coronary artery disease) 02/06/2017    Cervical radiculopathy 09/16/2021    Chronic constipation with acute exaccerbation 05/10/2021    Coronary artery disease     Degeneration of cervical intervertebral disc 08/11/2021    Diabetes mellitus     Diabetic foot ulcer 08/31/2020    Diabetic polyneuropathy associated with type 2 diabetes mellitus 01/18/2021    Elevated cholesterol     Gastroesophageal reflux disease 05/13/2019    Headache     HTN (hypertension), benign 05/03/2017    Hyperlipidemia     Hypertension     Mixed hyperlipidemia 02/07/2017    Multiple lung nodules 01/26/2020    5mm, 9 mm RLL identified 1/2020, not present 10/2019.    Myocardial infarction     Osteomyelitis 01/22/2020    Osteomyelitis of fifth toe of right foot 10/07/2019    Pancreatitis     Persistent insomnia 01/20/2020    Renal disorder     Sleep apnea 02/06/2017    Sleep apnea with use of continuous positive airway pressure (CPAP)     NON-COMPLIANT    Slow transit constipation 01/16/2019    Spinal stenosis in cervical region 09/16/2021    Vitamin D deficiency 03/02/2021     Past Surgical History:   Procedure Laterality  Date    ABDOMINAL SURGERY      AMPUTATION FOOT / TOE Left 10/2021    5th digit     ANTERIOR CERVICAL DISCECTOMY W/ FUSION N/A 08/05/2022    Procedure: CERVICAL DISCECTOMY ANTERIOR WITH FUSION C5-6 with possible plating of C5-7 with neuromonitoring and 1 c-arm;  Surgeon: Karel Soliz MD;  Location: [facility] OR;  Service: Neurosurgery;  Laterality: N/A;    APPENDECTOMY      BACK SURGERY      CARDIAC CATHETERIZATION Left 02/08/2021    Procedure: Left Heart Cath w poss intervention left anatomical snuff box acess;  Surgeon: Omkar Charles DO;  Location: [facility] CATH INVASIVE LOCATION;  Service: Cardiology;  Laterality: Left;    CARDIAC SURGERY      CATARACT EXTRACTION      CERVICAL SPINE SURGERY      COLONOSCOPY N/A 01/31/2017    Normal exam repeat in 5 years    COLONOSCOPY N/A 02/11/2019    Mild acute inflammation    COLONOSCOPY N/A 04/07/2024    2 areas at 10 and 20 cm with friability ulceration 2 clips placed at 20 cm and 4 clips at 10 cm poor prep normal mucosa,mild eroisions and ulcerations in visible vessels    COLONOSCOPY N/A 7/1/2024    Procedure: COLONOSCOPY WITH ANESTHESIA;  Surgeon: Arsalan Lorenzo DO;  Location: [facility] ENDOSCOPY;  Service: Gastroenterology;  Laterality: N/A;  pre op constipation/diarrhea  post poor prep  pcp Del Shetty    COLONOSCOPY N/A 7/2/2024    Procedure: COLONOSCOPY WITH ANESTHESIA;  Surgeon: Agapito Christopher MD;  Location: [facility] ENDOSCOPY;  Service: Gastroenterology;  Laterality: N/A;  pre rectal bleeding  post poor prep  pcp Del Shetty MD    COLONOSCOPY W/ POLYPECTOMY  03/04/2014    Hyperplastic polyp    CORONARY ARTERY BYPASS GRAFT  10/2015    ENDOSCOPY  04/13/2011    Gastritis with hemorrhage    ENDOSCOPY N/A 05/05/2017    Normal exam    ENDOSCOPY N/A 02/11/2019    Gastritis    ENDOSCOPY N/A 09/01/2020    Non-erosive gastritis with hemorrhage    ENDOSCOPY N/A 02/10/2021    Esophagitis    ENDOSCOPY N/A 04/11/2024    There were esophageal mucosal changes  "suspicious for short-segment Low's esophagus present in the distal esophagus. The maximum longitudinal extent of these mucosal changes was 2 cm in length. Mucosa was biopsied with a cold forceps for histologyDistal esophagus, biopsies: Mild chronic active esophagogastritis. No evidence of intestinal metaplasia, dysplasia. Antrum, bx, Mild chronic gastritis    FOOT SURGERY Left     INCISION AND DRAINAGE OF WOUND Left 2007    spider bite     Family History   Problem Relation Age of Onset    Colon cancer Father     Heart disease Father     Colon cancer Sister     Colon polyps Sister     Alzheimer's disease Mother     Coronary artery disease Sister     Coronary artery disease Sister      Social History     Socioeconomic History    Marital status:    Tobacco Use    Smoking status: Former     Current packs/day: 0.00     Types: Cigarettes     Quit date:      Years since quittin.6    Smokeless tobacco: Never    Tobacco comments:     smoked in [employer]   Vaping Use    Vaping status: Never Used   Substance and Sexual Activity    Alcohol use: No    Drug use: No    Sexual activity: Defer     Allergies   Allergen Reactions    Cefepime Hives and Anaphylaxis    Bactrim [Sulfamethoxazole-Trimethoprim] Other (See Comments)     \"RENAL FAILURE\"    Vancomycin Itching    Zolpidem Mental Status Change     \"makes him crazy\"    Metronidazole Rash     Current Facility-Administered Medications   Medication Dose Route Frequency Provider Last Rate Last Admin    ascorbic acid (VITAMIN C) tablet 1,000 mg  1,000 mg Oral Daily Reuben Garza APRN   1,000 mg at 24 0839    sennosides-docusate (PERICOLACE) 8.6-50 MG per tablet 2 tablet  2 tablet Oral BID Lamine Figueroa APRN   2 tablet at 24 0839    And    polyethylene glycol (MIRALAX) packet 17 g  17 g Oral Daily PRN Lamine Figueroa APRN        And    bisacodyl (DULCOLAX) EC tablet 5 mg  5 mg Oral Daily PRN Lamine Figueroa APRN        And    bisacodyl " (DULCOLAX) suppository 10 mg  10 mg Rectal Daily PRN Lamine Figueroa APRN   10 mg at 08/02/24 0941    Calcium Replacement - Follow Nurse / BPA Driven Protocol   Does not apply PRN Abebe Garzon DO        dextrose (D50W) (25 g/50 mL) IV injection 10-50 mL  10-50 mL Intravenous Q15 Min PRN Abebe Garzon DO        dextrose (D50W) (25 g/50 mL) IV injection 25 g  25 g Intravenous Q15 Min PRN Lamine Figueroa APRN        dextrose (GLUTOSE) oral gel 15 g  15 g Oral Q15 Min PRN Lamine Figueroa APRN        donepezil (ARICEPT) tablet 10 mg  10 mg Oral Daily Reuben Garza APRN   10 mg at 08/12/24 0840    DULoxetine (CYMBALTA) DR capsule 60 mg  60 mg Oral Daily Reuben Garza APRN   60 mg at 08/12/24 0839    Enoxaparin Sodium (LOVENOX) syringe 135 mg  1 mg/kg Subcutaneous Q12H Abebe Garzon DO   135 mg at 08/12/24 0840    famotidine (PEPCID) tablet 20 mg  20 mg Oral Q12H Reuben Garza APRN   20 mg at 08/12/24 0840    glucagon (GLUCAGEN) injection 1 mg  1 mg Intramuscular Q15 Min PRN Lamine Figueroa APRN        insulin glargine (LANTUS, SEMGLEE) injection 10 Units  10 Units Subcutaneous Daily Keren Jamison MD   10 Units at 08/12/24 0840    insulin regular (humuLIN R,novoLIN R) injection 3-14 Units  3-14 Units Subcutaneous 4x Daily AC & at Bedtime Keren Jamison MD   3 Units at 08/12/24 1111    ipratropium-albuterol (DUO-NEB) nebulizer solution 3 mL  3 mL Nebulization Q4H PRN Alejandrina Barnett DO   3 mL at 08/11/24 1906    Magnesium Standard Dose Replacement - Follow Nurse / BPA Driven Protocol   Does not apply PRN Abebe Garzon DO        melatonin tablet 2.5 mg  2.5 mg Oral Nightly Reuben Garza APRN   2.5 mg at 08/11/24 2129    nitroglycerin (NITROSTAT) SL tablet 0.4 mg  0.4 mg Sublingual Q5 Min PRN Lamine Figueroa APRN        ondansetron (ZOFRAN) injection 4 mg  4 mg Intravenous Q6H PRN Payam Keyes DO   4 mg at 08/11/24 2036    oxyCODONE-acetaminophen (PERCOCET) 5-325  MG per tablet 1 tablet  1 tablet Oral Q6H PRN Garrett Alicia MD   1 tablet at 08/12/24 0847    pantoprazole (PROTONIX) EC tablet 40 mg  40 mg Oral Q12H Reuben Garza APRN   40 mg at 08/12/24 0839    Pharmacy to Dose enoxaparin (LOVENOX)   Does not apply Continuous PRN Abebe Garzon DO        Phosphorus Replacement - Follow Nurse / BPA Driven Protocol   Does not apply PRN Abebe Garzon DO        polyethylene glycol (MIRALAX) packet 17 g  17 g Oral Daily Reuben Garza APRN   17 g at 08/09/24 0904    Potassium Replacement - Follow Nurse / BPA Driven Protocol   Does not apply PRN Abebe Garzon DO        pregabalin (LYRICA) capsule 100 mg  100 mg Oral Nightly Reuben Garza APRN   100 mg at 08/11/24 2130    pregabalin (LYRICA) capsule 50 mg  50 mg Oral Daily Reuben Garza APRN   50 mg at 08/12/24 0839    rosuvastatin (CRESTOR) tablet 10 mg  10 mg Oral Nightly Reuben Garza APRN   10 mg at 08/11/24 2129    sodium bicarbonate tablet 650 mg  650 mg Oral TID Brian Lomas MD   650 mg at 08/12/24 0839    sodium chloride 0.9 % flush 10 mL  10 mL Intravenous Q12H Abebe Garzon,    10 mL at 08/12/24 0840    sodium chloride 0.9 % flush 10 mL  10 mL Intravenous PRN Abebe Garzon DO        sodium chloride 0.9 % flush 10 mL  10 mL Intravenous Q12H Lamine Figueroa APRN   10 mL at 08/12/24 0840    sodium chloride 0.9 % flush 10 mL  10 mL Intravenous PRN Lamine Figueroa APRN        sodium chloride 0.9 % infusion 40 mL  40 mL Intravenous PRN Abebe Garzon,         sodium chloride 0.9 % infusion 40 mL  40 mL Intravenous PRN Lamine Figueroa APRN         Review of Systems   Constitutional:  Negative for activity change.   HENT:  Negative for congestion.    Respiratory:  Negative for apnea and shortness of breath.    Gastrointestinal:  Negative for abdominal pain.   Musculoskeletal:  Negative for arthralgias and back pain.        Foot pain   Skin:  Positive for wound.    Neurological:  Positive for numbness.   Psychiatric/Behavioral:  Positive for decreased concentration. Negative for agitation, behavioral problems and confusion.        OBJECTIVE     Vitals:    08/12/24 1319   BP: 165/85   Pulse: 80   Resp: 18   Temp: 98.3 °F (36.8 °C)   SpO2: 95%       PHYSICAL EXAM  GEN:   Pt presents in hospital bed. Accompanied by none.     Foot/Ankle Exam    GENERAL  Appearance:  chronically ill  Affect:  unfocused  Right shoe gear: sock  Left shoe gear: sock    VASCULAR     Right Foot Vascularity   Dorsalis pedis:  2+  Posterior tibial:  2+  Skin temperature:  warm  Edema grading:  Trace  CFT:  3  Pedal hair growth:  Absent     Left Foot Vascularity   Dorsalis pedis:  2+  Posterior tibial:  2+  Skin temperature:  warm  Edema grading:  Trace  CFT:  3  Pedal hair growth:  Absent     NEUROLOGIC     Right Foot Neurologic   Light touch sensation: diminished  Vibratory sensation: diminished  Hot/Cold sensation: diminished     Left Foot Neurologic   Light touch sensation: diminished  Vibratory sensation: diminished  Hot/Cold sensation:  diminished    MUSCULOSKELETAL     Right Foot Musculoskeletal   Ecchymosis:  none  Tenderness:  none       Left Foot Musculoskeletal   Ecchymosis:  none  Tenderness:  lateral foot tenderness    MUSCLE STRENGTH     Right Foot Muscle Strength   Foot dorsiflexion:  5  Foot plantar flexion:  5  Foot inversion:  5  Foot eversion:  5     Left Foot Muscle Strength   Foot dorsiflexion:  4+  Foot plantar flexion:  4+  Foot inversion:  4+  Foot eversion:  4+    DERMATOLOGIC      Right Foot Dermatologic   Skin  Right foot skin is intact.      Left Foot Dermatologic   Skin  Positive for drainage, erythema and ulcer.      Right foot additional comments: Multiple small blistered areas to the right dorsal toes.  Small blister to the right lateral distal foot.  Small amount of sanguinous drainage.     Left foot additional comments: Multiple areas of ulceration with serosanguineous  drainage, erythema, and slight tenderness present. No malodor noticed.    See attached photos.                         RADIOLOGY/NUCLEAR:  US Renal Bilateral    Result Date: 8/1/2024  Narrative: US RENAL BILATERAL- 8/1/2024 11:19 AM  HISTORY: Acute kidney injury; E11.628-Type 2 diabetes mellitus with other skin complications; L08.9-Local infection of the skin and subcutaneous tissue, unspecified; M86.9-Osteomyelitis, unspecified; E11.10-Type 2 diabetes mellitus with ketoacidosis without coma; R79.89-Other specified abnormal findings of blood chemistry; I48.91-Unspecified atrial fibrillation; R41.0-Disorientation, unspecified  COMPARISON: None available.   TECHNIQUE: Multiple longitudinal and transverse real-time sonographic images of the kidneys and urinary bladder are obtained. Report and images stored per institutional and state regulations.    FINDINGS:  Visualized proximal abdominal aorta and IVC are unremarkable.   RIGHT KIDNEY: Right kidney is 10.7 cm in length. Renal cortex is unremarkable. There is no hydronephrosis. There is an exophytic anechoic simple right renal cyst measuring 4.9 cm..  LEFT KIDNEY: Left kidney is 11.3 cm in length. Renal cortex is unremarkable. There is no left hydronephrosis.  PELVIS: Urinary bladder is unremarkable.       Impression:  1. Simple right renal cyst. 2. Otherwise unremarkable kidneys and no hydronephrosis.    This report was signed and finalized on 8/1/2024 1:02 PM by Eran Christina.      CT Abdomen Pelvis With Contrast    Result Date: 8/1/2024  Narrative: EXAMINATION: CT ABDOMEN PELVIS W CONTRAST-   7/31/2024 10:41 PM  HISTORY: abdominal distention with pain; M86.9-Osteomyelitis, unspecified; E11.10-Type 2 diabetes mellitus with ketoacidosis without coma; R79.89-Other specified abnormal findings of blood chemistry; I48.91-Unspecified atrial fibrillation; R41.0-Disorientation, unspecified  In order to have a CT radiation dose as low as reasonably achievable Automated  Exposure Control was utilized for adjustment of the mA and/or KV according to patient size.  Total DLP = 1841.72 mGy.cm  The CT scan of the abdomen and pelvis is performed after intravenous contrast enhancement.  The images are acquired in axial plane and subsequent reconstruction in coronal and sagittal planes.  Comparison is made with the previous study dated 6/27/2024.  The lung bases included in the study show a trace right and small left basal pleural effusion. There are mild atelectatic changes at bilateral bases left more than the right.  Limited visualized cardiomediastinal structures show atheromatous changes of the coronary arteries. There is moderate cardiomegaly.  The liver and spleen are normal.  The gallbladder is surgically absent.  Fatty infiltrated pancreas seen. No focal abnormality. No ductal dilatation. The adrenal glands are normal.  There is persistent bilateral significant perinephric fat infiltration and thickening of the pararenal fascia which is similar to the previous study. Bilateral nephrogram is normal and symmetrical. No calculi. No hydronephrosis. There is a well-defined sharply marginated low density exophytic mass from the lower pole of the right kidney measuring 4.4 cm in diameter. CT density suggest a cyst. Limited visualized ureters are normal and nondilated. The urinary bladder is well distended. No intrinsic abnormality.  Prostate is not significantly enlarged.  There are small fat-containing inguinal hernias, right larger than the left.  There is subcutaneous fat infiltration of the entire abdominal wall extending into the lower extremity. This may represent a fluid overload?.  The stomach is decompressed with moderate wall thickening. No focal abnormality Alamast. Duodenum is normal. Small bowel is nondistended and nondilated. Appendix is surgically absent. There is significant large volume stool throughout the colon. No finding to suggest obstruction.  Atheromatous changes  of the abdominal aorta and iliac arteries. No aneurysmal dilatation.  Moderately prominent nonspecific retroperitoneal para-aortic lymph nodes predominantly in the mid abdomen. A referenced left para iliac lymph node, image #57 in series 2 and image #40 and series 3, measures 1.6 cm in short axis. There is a large lymph node in the left lower anterior pelvis/external iliac group measuring 1.3 cm in short axis. There are moderately prominent inguinal lymph nodes. A left inguinal lymph node measures 2 cm in short axis.  Images reviewed in bone window show chronic degenerative changes of the lumbar spine. No acute bony abnormality.      Impression: 1. A significant perinephric fat stranding is similar to the previous study and is a nonspecific finding. Possibility for chronic inflammatory process/chronic pyelonephritis may not be excluded. Renal functions are normal and symmetrical. 2. Fatty infiltration of the pancreas. No mass. No ductal dilatation. 3. Nonspecific abdominal, pelvic and inguinal lymphadenopathy. The etiology and clinical significance is not certain. This appears moderately more progressive since the previous study. 4. Diffuse subcutaneous fat infiltration of the abdominal wall extending into the lower extremity may represent fluid overload?. 5. A significant large volume of stool in the colon without evidence of obstruction. This may represent constipation.  The above study was initially reviewed and reported by [employer]. I do not find any discrepancies.           This report was signed and finalized on 8/1/2024 7:50 AM by Dr. Carlos Cutler MD.      CT Head Without Contrast    Result Date: 8/1/2024  Narrative: EXAMINATION: CT HEAD WO CONTRAST-   7/31/2024 10:41 PM  HISTORY: altered mental status; M86.9-Osteomyelitis, unspecified; E11.10-Type 2 diabetes mellitus with ketoacidosis without coma; R79.89-Other specified abnormal findings of blood chemistry; I48.91-Unspecified atrial fibrillation;  R41.0-Disorientation, unspecified  In order to have a CT radiation dose as low as reasonably achievable Automated Exposure Control was utilized for adjustment of the mA and/or KV according to patient size.  Total DLP = 783.72 mGy.cm  The CT scan of the head is performed without intravenous contrast enhancement.  The images are acquired in axial plane and subsequent reconstruction with coronal and sagittal planes.  Comparison is made with the previous study dated 5/3/2024.  There is no evidence of a mass. There is no midline shift.  There is no evidence of intracranial hemorrhage or hematoma.  Moderately dilated ventricles, basal cisterns and the cortical sulci are similar to the previous study representing chronic volume loss.  Areas of chronic white matter ischemia bilaterally are noted. The gray-white matter differentiation is maintained.  Posterior fossa structures are normal.  The images reviewed in bone window show no acute displaced skull fracture. A subtle nondisplaced fracture or lesion may be obscured due to motion artifacts. Large mucous retention cyst is seen in the right maxillary antrum. The remaining paranasal sinuses and mastoid air cells are clear.      Impression: 1. No acute intracranial abnormality. 2. Chronic ischemic and atrophic changes. 3. Chronic maxillary sinusitis.  The above study was initially reviewed and reported by [employer]. I do not find any discrepancies.             This report was signed and finalized on 8/1/2024 5:27 AM by Dr. Carlos Cutler MD.      XR Foot 3+ View Left    Result Date: 7/31/2024  Narrative: EXAMINATION:  XR FOOT 3+ VW LEFT-  7/31/2024 6:22 PM  HISTORY: Heel wound.  COMPARISON: 3/26/2024.  TECHNIQUE: 3 views were obtained.  FINDINGS: There is a deep ulcer on the sole of the foot posteriorly. The ulcer appears to be packed with some type of radiopaque material. On the lateral image, there may be a small area of bony erosion involving the calcaneus just posterior  to the plantar calcaneal spur. A small area of osteomyelitis is not ruled out. There has been prior amputation of the fifth toe and distal fifth metatarsal. There may have been prior resection of the distal fourth metacarpal and a portion of the proximal phalanx of the fourth toe versus chronic erosive change. The appearance is stable. There is severe narrowing of the first MTP joint. There is narrowing of some of the interphalangeal joints. There is some spurring in the tarsal region and at the tarsal-metatarsal junction. There is soft tissue swelling of the foot diffusely. There is a small curvilinear foreign body in the soft tissues along the sole of the foot in the second toe region in the proximal phalanx area. There is another small linear foreign body in the soft tissues adjacent to the distal phalanx of the great toe.       Impression: 1. Deep ulcer on the sole of the foot posteriorly near the calcaneus. There is a questionable small area of bony erosion along the plantar surface of the calcaneus just proximal to the plantar calcaneal spur. Osteomyelitis cannot be ruled out. The soft tissue ulcer is packed with some type of radiopaque material. 2. Linear foreign bodies projected over the soft tissues of the second toe and first toe. Artifacts are also included in the differential. 3. Other chronic changes, as discussed.   This report was signed and finalized on 7/31/2024 7:47 PM by Dr. Raz Mendes MD.      XR Chest 1 View    Result Date: 7/31/2024  Narrative: EXAMINATION:  XR CHEST 1 VW-  7/31/2024 6:22 PM  HISTORY: Altered mental status. Hypertension and diabetes.  COMPARISON: 6/28/2024.  TECHNIQUE: Single view AP image.  FINDINGS: There is hypoventilation with vascular crowding. There is mild bronchial wall thickening, stable. There is no dense infiltrate or effusion. Heart size is borderline. Prior heart bypass surgery. Prior cervical fusion. No definite acute bony abnormality.       Impression: 1.  Hypoventilation with vascular crowding. 2. Mild bronchial wall thickening, stable.    This report was signed and finalized on 7/31/2024 7:41 PM by Dr. Raz Mendes MD.       LABORATORY/CULTURE RESULTS:  Results from last 7 days   Lab Units 08/12/24  0609 08/11/24  0537 08/10/24  0626   WBC 10*3/mm3 6.75 6.60 7.02   HEMOGLOBIN g/dL 10.1* 10.1* 10.1*   HEMATOCRIT % 32.6* 31.3* 32.1*   PLATELETS 10*3/mm3 261 248 271     Results from last 7 days   Lab Units 08/12/24  0609 08/11/24  0537 08/10/24  0626   SODIUM mmol/L 143 140 142   POTASSIUM mmol/L 3.9 4.0 3.8   CHLORIDE mmol/L 109* 110* 109*   CO2 mmol/L 25.0 22.0 23.0   BUN mg/dL 17 17 16   CREATININE mg/dL 1.47* 1.43* 1.48*   CALCIUM mg/dL 8.5* 8.3* 8.2*   GLUCOSE mg/dL 148* 133* 101*         Microbiology Results (last 10 days)       ** No results found for the last 240 hours. **            PATHOLOGY RESULTS:       ASSESSMENT/PLAN   Diabetic foot ulcerations to the left foot and right foot  Type 2 diabetes mellitus with hyperglycemia  Diabetic polyneuropathy      Linear foreign bodies projected over the soft tissues of the second  toe and first toe were noted upon xray imaging (artifacts were also included in the differential). No evidence of soft tissue damage, FB entry, or infection noted to either great or second toes today.      Betadine wet-to-dry dressing to be performed twice daily per nursing bilateral feet..     No surgical interventions are planned at this time from a podiatric standpoint.     Due to difficulty caring for foot wounds at home, our recommendation would be for the patient to go to a skilled nursing facility for ongoing wound care upon discharge.       Patient to follow-up as outpatient with his previous provider, Dr. Gomes.    We will continue to follow patient while admitted.      This document has been electronically signed by SUSAN Perez on August 12, 2024 16:28 CDT

## 2024-08-13 NOTE — PAYOR COMM NOTE
"DC TO SNF 8-12-24    Erick Luong (69 y.o. Male)       Date of Birth   1955    Social Security Number       Address   [address] [place] [address]    Home Phone       MRN   [id number]       [facility]    Marital Status                               Admission Date   7/31/24    Admission Type   Emergency    Admitting Provider   Payam Keyes DO    Attending Provider       Department, Room/Bed   [facility] 3C, 365/1       Discharge Date   8/12/2024    Discharge Disposition   Skilled Nursing Facility (DC - External)    Discharge Destination                                 Attending Provider: (none)   Allergies: Cefepime, Bactrim [Sulfamethoxazole-trimethoprim], Vancomycin, Zolpidem, Metronidazole    Isolation: None   Infection: MRSA (05/19/19), COVID (History) (08/08/22), ESBL E coli (06/30/24)   Code Status: Prior    Ht: 182.9 cm (72\")   Wt: 142 kg (312 lb 3.2 oz)    Admission Cmt: None   Principal Problem: DKA, type 2, not at goal [E11.10]                   Active Insurance as of 7/31/2024       Primary Coverage       Payor Plan Insurance Group Employer/Plan Group    [employer] [facility] EMPLOYEE [id number]       Payor Plan Address Payor Plan Phone Number Payor Plan Fax Number Effective Dates    [address] [phone]  1/1/2022 - None Entered    [facility] [id number]         Subscriber Name Subscriber Birth Date Member ID       ZAINAB LUONG 11/27/1969 [id number]               Secondary Coverage       Payor Plan Insurance Group Employer/Plan Group    MEDICARE MEDICARE A & B        Payor Plan Address Payor Plan Phone Number Payor Plan Fax Number Effective Dates    [address] [phone]  7/1/2013 - None Entered    [facility] [id number]         Subscriber Name Subscriber Birth Date Member ID       ERICK LUONG 1955 [id number]                     Emergency Contacts        (Rel.) Home Phone Work Phone Mobile Phone    " Joan Luong (Spouse) [phone]                 Discharge Summary        KeyesPayam S, DO at 08/12/24 1334              [facility]  DISCHARGE SUMMARY       Date of Admission: 7/31/2024  Date of Discharge:  8/12/2024  Primary Care Physician: Del Shetty MD    Discharge Diagnoses:  Active Hospital Problems    Diagnosis     **DKA, type 2, not at goal     E. coli UTI, ESBL     Atrial fibrillation     Chronic heart failure with preserved ejection fraction (HFpEF)     Chronic diastolic heart failure     GERD without esophagitis     Diabetic ulcer of left foot associated with type 2 diabetes mellitus          Presenting Problem/History of Present Illness:  DKA, type 2, not at goal [E11.10]     Chief Complaint on Day of Discharge:   Diabetic foot wounds, generalized weakness and difficulty ambulating    History of Present Illness on Day of Discharge:   The patient is at baseline.  He continues to receive care for diabetic foot wounds.  He will require rehab after discharge and is appropriate for discharge today to skilled nursing facility.    Hospital Course  Mr. Luong is a 69-year-old male who presented to [facility] ER via EMS for altered mental status.  It was reported to ER physician by EMS that the patient was was found by his son confused and wandering around in the garage.  Unfortunately family was not available for additional information.   HPI obtained from ER physician, Dr. Dee, who advises that patient presented soiled, confused and unable to provided events leading to his reason for EMS transport.      ER course workup significant for: CBC with elevated WBC of 15.4, low hemoglobin 9.8 and hematocrit 29.6, elevated ESR 57, elevated CRP of 17.  CMP with low sodium 133, elevated BUN of 54 and elevated creatinine 2.64, elevated glucose 704, urine ketones and small amount of acetone. Elevated lactate 2.4 with delta 2.2.  Elevated  HS troponin T 342 delta 353.   Urinalysis: Trace blood, positive nitrite, negative leukocytes, +2 bacteria.   CXR: Low lung volumes and vascular crowding.  X-ray of left foot: Ulcer on the sole of the foot posteriorly near the calcaneus.  There is questionable small area of bony erosion along the plantar surface of the calcaneus just proximal to the plantar calcaneal spur.  Osteomyelitis cannot be ruled out.   EKG: A-fib with rapid ventricular response with rate of 128 bpm.      The ICU team was asked to evaluate the patient for AMS, AFIB-RVR, DKA, and open wound to left foot concerning for possible osteomyelitis.      I have seen and evaluated patient upon his arrival to CCU 4 where he appears in no acute distress, breathing is equal and non-labored.  He is slow to provided answers and is intermittently confused. He is able to tell me his name, , and the year after many tries, he is unaware to reason for hospitalization or how he got here.   He currently has insulin, and Cardizem drip infusing. Reports generalized discomfort, worse throughout the abdomen with associated nausea and reports vomiting early today and being sick the last couple days, does not give further details. Abdomen is distended and soft, grossly tender. Denies diarrhea or bloody/dark stool. Reports chest discomfort and shortness of breath. Open stage 2-3 decubitus ulcer to the left heel with associated soft tissue swelling, redness and weeping.      Assessment/Plan   This is a 69-year-old male who presented to [facility] ER via EMS for altered mental status.  PMHx DM type II, atrial fibrillation, CAD, chronic poor healing diabetic left foot ulcer with osteomyelitis, diabetic polyneuropathy, hypertension, hyperlipidemia insomnia, CKD stage IV, BRITTNI-CPAP, cervical spine stenosis, and vitamin D deficiency with numerous hospitalizations this year. PT was found to be confused in  AFIB-RVR, DKA, and open wound to left heal concerning acute infection with  osteomyelitis. Critical care team asked to further manage acute illness.      Patient will be admitted under intensivist MD, Dr. Jamison, case will be discussed in the AM.  Further recommendations and/or modifications to the treatment plan are ultimately at his discretion.     Treatment Plan     AFIB-RVR  -chronic history of atrial fibrillation. On review of PMHx records he was prescribed and taking Eliquis, which has been held due to GI bleeding after which he was suppose to f/u with cardiology about restarting. Epic shows cancelled f/u visits with cardiology, medication is currently not on patients med list.  -Lovenox AFIB coverage ordered in ED.  -Cardizem drip started in ED, rate controlled currently 80-90 bpm.   -likely rapid ventricular response 2/2 acute illness and probable underlying infection.  -Plan to DC Cardizem and transition back to his home dose BB  -elevated troponin in  delta 353, likely type II MI, cardiology consulted in ER, we appreciate their continued input.     ?Diabetic ketoacidosis  -History DM type 2 insulin-dependent  -small amount of acetone, ketones in urine  -do not have beta hydroxybutyrate at this facility  -BS at arrival 704  -normal anion gap  -normal pH 7.41  -Insulin drip started in ER will discontinue and transition to SSI  -likely HHS in the setting of acute illness     Urinary Tract Infection  -urinalysis shows nitrate positive, and +2 bacteria  -Hx of ESBL 6/30/24  -Urine culture pending  -Started on Zosyn/Vanco in the ER will continue  -ID consult     Diabetic foot ulcer  -chronic open wound, DC ulcer stage 2-3 to left heal with larger area soft pale tissue with surrounding edema/erythema and weeping at the outer aspect of the foot along the area of the 5th metatarsal.   -Hx of osteomyelitis and related MRSA bacteremia   -Zosyn/Vanco started in ED will continue   -podiatry consult  -wound care consult     Altered mental status  -likely related to acute  illness/metabolic encephalopathy.  -cannot r/u stroke. No CT head in ER. No focal neuro deficits  -CT head ordered-results pending      Abdominal pain  -reported nausea/vomiting. Abdominal distention and tender on exam  -Hx of pancreatitis  -previous appendectomy  -Hx of GI bleeding, no active bleeding noted  -No CT abd/pel in ER  -CT abd/pel with contrast ordered-results pending     CKD-IV  -bun/creatinine at baseline  -monitor lytes and urine output   -avoid neph toxic medications    Cardiology, nephrology and infectious diseases on consult at the time of admission.  Consultation recommendations are noted in the consult section below.  The patient was continued on vancomycin given his history of MRSA infection in the left foot.  Zosyn was discontinued.  Podiatry was consulted.  Cardiology saw the patient noting that the patient was in atrial fibrillation and elevated heart rate with elevated troponins secondary to diabetic ketoacidosis and his host of other medical issues present on arrival.  No further testing or therapeutic changes are recommended regarding atrial fibrillation.  Nephrology recommended continuing IV fluids and close monitoring of vancomycin level and labs as well as urine electrolytes.  Podiatry recommended deep wound culture during debridement of left foot wounds and topical wound care.  PT and OT were consulted.  Both services recommended SNF placement at discharge.  Renal function returned to baseline as of 8//23.  The patient remained in sinus rhythm and continued to receive wound care throughout his hospital stay.  Infectious diseases started ertapenem on 8/4 to cover both ESBL in urine as well as group B streptococcus.  He remained on ertapenem for 5-day course.  LTAC referral was placed and the patient was denied admission.  He has agreed to SNF placement and SNF referrals of those were made.  It is noted that the patient was unable to take care of his own ones effectively at home.  He  remained on local wound care for the remainder of his hospital stay.  Health system is the only accepting facility and admissions begin working on precertification on 8/9/2024.  The patient was placed on Betadine wet-to-dry dressings to be performed twice daily and he remained on that regimen throughout his hospital stay with improvement in the patient's wound bed.  He has been accepted to [facility] today in transfer for continued wound care and rehabilitation.      Consults:   Cardiology:  ASSESSMENT/PLAN:     1.  Altered mental status due to problem #2, most likely  2.  Diabetic ketoacidosis  3.  Urinary tract infection  4.  Diabetic foot ulcer with radiographic concern for osteomyelitis  5.  Atrial fibrillation with rapid ventricular response, present on arrival  6.  Elevated troponin: No ischemic symptoms, therefore considered to represent myocardial injury in the setting of the above mentioned medical problems  7.  Abdominal pain: Essentially chronic for this patient.  CT scan findings noted above  8.  Stage IV chronic kidney disease     -We are asked to see this patient with atrial fibrillation and elevated heart rate along with elevated troponin: The patient had elevated heart rate upon arrival which is not surprising given his host of medical issues present on arrival including all of the above-mentioned problems.  His heart rate is now improved and the patient is otherwise asymptomatic.  Therefore, I would not recommend any further testing or changes in therapy regarding atrial fibrillation.  Regarding stroke risk reduction, he has not historically been anticoagulated as he is thought to be a very poor candidate for anticoagulation given his host of medical issues including multiple hospital admissions, infections, renal insufficiency, bouts of altered mental status, etc.  Therefore, I would not recommend therapeutic anticoagulation at this time for the patient.  -In regards to elevated troponin: The patient  "frankly denies chest pain.  I am not certain why this was checked by the emergency department provider other than simply following a \"protocol\" to check troponin levels on patient to come to the emergency department.  Without any suspicion of myocardial ischemia, there is simply not a good reason to check a troponin level in this particular patient who has simply had an elevated troponin level at almost every visit to the hospital but more importantly, in this particular case, with worsening renal function on arrival, diabetic ketoacidosis, atrial fibrillation with elevated heart rate, it is not surprising to see a troponin elevation significantly higher than what was previously appreciated.  At this time, no ischemic testing will be offered as the patient simply does not have any ischemic symptoms and I do not feel that this would be beneficial at this time.  -No further cardiac needs at the present time therefore we will be available as needed.  Please feel free to call if there are further questions.      Infectious diseases:  HOSPITAL PROBLEM LIST:       DKA, type 2, not at goal        IMPRESSION:  Confusion on presentation-mental status back to baseline.  Patient with glucose in the 700s on admission.  Likely contributing to his altered mental status.  Suspect infection involving left foot and certainly has portal of entry for bacteria.  Diabetic foot infection with history of osteomyelitis persistent open wound on heel and concerning area of nonviable skin laterally (no rios necrosis) associated with cellulitis.  I had not seen the x-rays of the foot when I examined the patient so did not specifically look at the areas of concern for foreign body  Uncontrolled diabetes mellitus with hemoglobin A1c greater than 12  Complains of abdominal pain-patient has had that off and on previously and has been seen by GI during his admissions.  There is documented colonoscopy from July 2 however patient had inadequate " prep.  Leukocytosis-improved  Chronic kidney disease stage IIIb with acute kidney injury        RECOMMENDATION:   Continue vancomycin  Continue zosyn  Follow-up blood cultures  Reevaluate left foot more closely given concerns for foreign body versus artifact on x-rays  Await wound care evaluation-need to monitor lateral ankle closely.  Heel wound appears to be chronic and fairly clean  Diabetes management per intensivist     Reviewed emergency department notes  Reviewed admission history and physical     Julia Adrian MD    Nephrology:  Assessment   1.  Acute kidney injury/worsening.  2.  Acute tubular necrosis.  3.  Stage IIIb chronic kidney disease baseline.  4.  Type II diabetic nephropathy.  5.  Sepsis related to diabetic foot ulcer.  6.  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  Morbid abdominal obesity.  8.  Anemia of chronic kidney disease.  9.  Poorly controlled type 2 diabetes.     Plan:  1.  Continue IV fluid.  2.  Urinary electrolytes/UACR.  3.  Close monitoring of Vanco level  4.  Continue to monitor renal function.  5.  Baseline iron studies, may need RYANNE.  Also get serum PTH level.  6.  Plan was discussed with the patient and critical care nurse     Thank you for the consult, we appreciate the opportunity to provide care to your patients.  Feel free to contact me if I can be of any further assistance.       Brian Lomas MD    Podiatry:  ASSESSMENT/PLAN   Diabetic foot ulcerations to left foot  Type 2 diabetes mellitus with hyperglycemia  Diabetic polyneuropathy     Review of labs, imaging, and other provider documentation  Comprehensive lower extremity examination and evaluation was performed.     Linear foreign bodies projected over the soft tissues of the second  toe and first toe were noted upon xray imaging (artifacts were also included in the differential). No evidence of soft tissue damage, FB entry, or infection noted to either great or second toes today.      Deep wound culture obtained  "today during debridement of wounds to left foot.     Orders for wound care placed-  Betadine wet-to-dry dressing to be performed twice daily per nursing.     From Podiatry standpoint, no surgical interventions are planned at this time.     Overall recommendation would be to send patient to a skilled nursing facility for ongoing wound care upon discharge.     Thank you kindly for the consult, will continue to follow patient while in house.        This document has been electronically signed by SUSAN Luna on August 2, 2024 16:07 CDT                         Cosigned by: Asad Cerrato DPM         Result Review    Result Review:  I have personally reviewed the results from the time of this admission to 8/12/2024 13:34 CDT and agree with these findings:  []  Laboratory  []  Microbiology  []  Radiology  []  EKG/Telemetry   []  Cardiology/Vascular   []  Pathology  []  Old records  []  Other:    Condition on Discharge:    Stable and improved    Physical Exam on Discharge:  /85 (BP Location: Right arm, Patient Position: Sitting)   Pulse 80   Temp 98.3 °F (36.8 °C) (Oral)   Resp 18   Ht 182.9 cm (72\")   Wt (!) 142 kg (312 lb 3.2 oz)   SpO2 95%   BMI 42.34 kg/m²   Physical Exam       Constitutional:       Appearance: He is obese.   HENT:      Head: Normocephalic and atraumatic.      Right Ear: External ear normal.      Left Ear: External ear normal.      Nose: Nose normal.      Mouth/Throat:      Mouth: Mucous membranes are moist.   Eyes:      Extraocular Movements: Extraocular movements intact.      Conjunctiva/sclera: Conjunctivae normal.      Pupils: Pupils are equal, round, and reactive to light.   Cardiovascular:      Rate and Rhythm: Normal rate and regular rhythm.      Pulses: Normal pulses.      Heart sounds: Normal heart sounds.   Pulmonary:      Effort: Pulmonary effort is normal.   Abdominal:      General: Bowel sounds are normal.      Palpations: Abdomen is soft.   Musculoskeletal:     "  Cervical back: Normal range of motion.      Comments: Moves all extremities   Skin:     General: Skin is warm and dry.      Capillary Refill: Capillary refill takes less than 2 seconds.      Comments: Dressing left foot/heel intact.   Neurological:      Mental Status: He is alert and oriented to person, place, and time.   Psychiatric:      Comments: Affect  is flat.     Discharge Disposition:  Skilled Nursing Facility (DC - External)    Discharge Medications:     Discharge Medications        New Medications        Instructions Start Date   apixaban 5 MG tablet tablet  Commonly known as: ELIQUIS   5 mg, Oral, 2 Times Daily      oxyCODONE-acetaminophen 5-325 MG per tablet  Commonly known as: PERCOCET   1 tablet, Oral, Every 6 Hours PRN      sodium bicarbonate 650 MG tablet   650 mg, Oral, 3 Times Daily             Changes to Medications        Instructions Start Date   pregabalin 100 MG capsule  Commonly known as: LYRICA  What changed:   how much to take  when to take this   100 mg, Oral, Nightly      pregabalin 50 MG capsule  Commonly known as: LYRICA  What changed:   medication strength  how much to take   50 mg, Oral, Daily   Start Date: August 13, 2024            Continue These Medications        Instructions Start Date   ascorbic acid 1000 MG tablet  Commonly known as: VITAMIN C   1,000 mg, Oral, Daily      donepezil 10 MG tablet  Commonly known as: ARICEPT   10 mg, Oral, Daily      DULoxetine 60 MG capsule  Commonly known as: CYMBALTA   60 mg, Oral, Daily      famotidine 20 MG tablet  Commonly known as: PEPCID   Take 1 tablet twice a day by oral route.      Insulin Regular Human (Conc) 500 UNIT/ML solution pen-injector CONCENTRATED injection  Commonly known as: HumuLIN R   40 Units, Subcutaneous, 2 Times Daily Before Meals, Inject 40 units under the skin in the the appropriate area with regular meals AND 40 units with large meals.       Lantus SoloStar 100 UNIT/ML injection pen  Generic drug: Insulin Glargine    20 Units, Subcutaneous, Every Night at Bedtime      melatonin 3 MG tablet   6 mg, Oral, Nightly PRN      nitroglycerin 0.4 MG SL tablet  Commonly known as: NITROSTAT   0.4 mg, Sublingual, Every 5 Minutes PRN, Take no more than 3 doses in 15 minutes.      pantoprazole 40 MG EC tablet  Commonly known as: PROTONIX   40 mg, Oral, Every 12 Hours Scheduled      polyethylene glycol 17 GM/SCOOP powder  Commonly known as: MIRALAX   17 g, Oral, Daily PRN      rosuvastatin 10 MG tablet  Commonly known as: CRESTOR   10 mg, Oral, Nightly      sennosides-docusate 8.6-50 MG per tablet  Commonly known as: PERICOLACE   1 tablet, Oral, Nightly, Obtain OTC             Stop These Medications      bumetanide 1 MG tablet  Commonly known as: BUMEX     busPIRone 10 MG tablet  Commonly known as: BUSPAR     calcitriol 0.5 MCG capsule  Commonly known as: ROCALTROL     carvedilol 3.125 MG tablet  Commonly known as: COREG     Diclofenac Sodium 1 % gel gel  Commonly known as: VOLTAREN     levocetirizine 5 MG tablet  Commonly known as: XYZAL     oxyCODONE 10 MG tablet  Commonly known as: ROXICODONE     sucralfate 1 g tablet  Commonly known as: CARAFATE     Vitron-C  MG tablet  Generic drug: Iron-Vitamin C              Discharge Diet:   Diet Instructions       Diet: Diabetic Diets; Consistent Carbohydrate; Thin (IDDSI 0)      Discharge Diet: Diabetic Diets    Diabetic Diet: Consistent Carbohydrate    Fluid Consistency: Thin (IDDSI 0)            Discharge Care Plan / Instructions:   Discharge to skilled nursing facility    Activity at Discharge:   Activity Instructions       Activity as Tolerated              Follow-up Appointments:  Follow-up with wound care next week    Electronically signed by Payam Keyes DO, 08/12/24, 13:34 CDT.    Time: Discharge over 30 min    Part of this note may be an electronic transcription/translation of spoken language to printed text using the Dragon Dictation system.        Electronically signed by  Payam Keyes, DO at 08/12/24 1345

## 2024-08-13 NOTE — THERAPY DISCHARGE NOTE
Acute Care - Physical Therapy Discharge Summary  [facility]       Patient Name: Erick Luong  : 1955  MRN: [id number]    Today's Date: 2024                 Admit Date: 2024      PT Recommendation and Plan    Visit Dx:    ICD-10-CM ICD-9-CM   1. Diabetic foot infection  E11.628 250.80    L08.9 686.9   2. Osteomyelitis of foot, unspecified laterality, unspecified type  M86.9 730.27   3. Diabetic ketoacidosis without coma associated with type 2 diabetes mellitus  E11.10 250.12   4. Elevated troponin  R79.89 790.6   5. Atrial fibrillation with RVR  I48.91 427.31   6. Confusion  R41.0 298.9   7. Impaired mobility [Z74.09]  Z74.09 799.89   8. Diabetic polyneuropathy associated with type 2 diabetes mellitus  E11.42 250.60     357.2                PT Rehab Goals       Row Name 24 1100             Transfer Goal 1 (PT)    Activity/Assistive Device (Transfer Goal 1, PT) sit-to-stand/stand-to-sit  -AB      [place] Level/Cues Needed (Transfer Goal 1, PT) standby assist  -AB      Time Frame (Transfer Goal 1, PT) 10 days  -AB      Progress/Outcome (Transfer Goal 1, PT) goal met  -AB         Gait Training Goal 1 (PT)    Activity/Assistive Device (Gait Training Goal 1, PT) gait (walking locomotion);assistive device use  -AB      [place] Level (Gait Training Goal 1, PT) contact guard required  -AB      Distance (Gait Training Goal 1, PT) 40ft  -AB      Time Frame (Gait Training Goal 1, PT) 10 days  -AB      Progress/Outcome (Gait Training Goal 1, PT) goal met  -AB                User Key  (r) = Recorded By, (t) = Taken By, (c) = Cosigned By      Initials Name Provider Type Discipline    AB Donna Rodriguez, PTA Physical Therapist Assistant PT                        PT Discharge Summary  Anticipated Discharge Disposition (PT): skilled nursing facility  Reason for Discharge: Discharge from facility  Outcomes Achieved: Refer to plan of care for updates on goals achieved  Discharge Destination:  SNF      Donna Rodriguez, PTA   8/13/2024

## 2024-10-20 ENCOUNTER — APPOINTMENT (OUTPATIENT)
Dept: CT IMAGING | Facility: HOSPITAL | Age: 69
End: 2024-10-20
Payer: COMMERCIAL

## 2024-10-20 ENCOUNTER — APPOINTMENT (OUTPATIENT)
Dept: GENERAL RADIOLOGY | Facility: HOSPITAL | Age: 69
End: 2024-10-20
Payer: COMMERCIAL

## 2024-10-20 ENCOUNTER — HOSPITAL ENCOUNTER (EMERGENCY)
Facility: HOSPITAL | Age: 69
Discharge: HOME | End: 2024-10-20
Attending: FAMILY MEDICINE | Admitting: FAMILY MEDICINE
Payer: COMMERCIAL

## 2024-10-20 VITALS
WEIGHT: 273.2 LBS | DIASTOLIC BLOOD PRESSURE: 74 MMHG | SYSTOLIC BLOOD PRESSURE: 131 MMHG | TEMPERATURE: 97.5 F | HEIGHT: 72 IN | BODY MASS INDEX: 37 KG/M2 | HEART RATE: 86 BPM | OXYGEN SATURATION: 94 % | RESPIRATION RATE: 20 BRPM
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
## 2024-10-20 DIAGNOSIS — S70.01XA CONTUSION OF RIGHT HIP, INITIAL ENCOUNTER: ICD-10-CM

## 2024-10-20 DIAGNOSIS — S13.9XXA NECK SPRAIN, INITIAL ENCOUNTER: ICD-10-CM

## 2024-10-20 DIAGNOSIS — S22.41XA CLOSED FRACTURE OF MULTIPLE RIBS OF RIGHT SIDE, INITIAL ENCOUNTER: Primary | ICD-10-CM

## 2024-10-20 DIAGNOSIS — S53.401A ELBOW SPRAIN, RIGHT, INITIAL ENCOUNTER: ICD-10-CM

## 2024-10-20 DIAGNOSIS — S43.401A SPRAIN OF RIGHT SHOULDER, UNSPECIFIED SHOULDER SPRAIN TYPE, INITIAL ENCOUNTER: ICD-10-CM

## 2024-10-20 PROCEDURE — 73502 X-RAY EXAM HIP UNI 2-3 VIEWS: CPT

## 2024-10-20 PROCEDURE — 96372 THER/PROPH/DIAG INJ SC/IM: CPT

## 2024-10-20 PROCEDURE — 99284 EMERGENCY DEPT VISIT MOD MDM: CPT

## 2024-10-20 PROCEDURE — 73080 X-RAY EXAM OF ELBOW: CPT

## 2024-10-20 PROCEDURE — 72125 CT NECK SPINE W/O DYE: CPT

## 2024-10-20 PROCEDURE — 71250 CT THORAX DX C-: CPT

## 2024-10-20 PROCEDURE — 74176 CT ABD & PELVIS W/O CONTRAST: CPT

## 2024-10-20 PROCEDURE — 63710000001 ONDANSETRON ODT 4 MG TABLET DISPERSIBLE: Performed by: FAMILY MEDICINE

## 2024-10-20 PROCEDURE — 71101 X-RAY EXAM UNILAT RIBS/CHEST: CPT

## 2024-10-20 PROCEDURE — 25010000002 MORPHINE PER 10 MG: Performed by: FAMILY MEDICINE

## 2024-10-20 PROCEDURE — 73030 X-RAY EXAM OF SHOULDER: CPT

## 2024-10-20 RX ORDER — ONDANSETRON 4 MG/1
4 TABLET, ORALLY DISINTEGRATING ORAL ONCE
Status: COMPLETED | OUTPATIENT
Start: 2024-10-20 | End: 2024-10-20

## 2024-10-20 RX ORDER — BUSPIRONE HYDROCHLORIDE 10 MG/1
10 TABLET ORAL ONCE
Status: DISCONTINUED | OUTPATIENT
Start: 2024-10-20 | End: 2024-10-20

## 2024-10-20 RX ADMIN — MORPHINE SULFATE 4 MG: 4 INJECTION, SOLUTION INTRAMUSCULAR; INTRAVENOUS at 20:32

## 2024-10-20 RX ADMIN — ONDANSETRON 4 MG: 4 TABLET, ORALLY DISINTEGRATING ORAL at 20:32

## 2024-10-21 NOTE — ED PROVIDER NOTES
HPI:    Patient is a 69-year-old white man who is still undergoing physical therapy who just left out of the rehab facility and came home presents after a fall yesterday while getting off the toilet in the bathroom and hitting the right side of his ribs.  And then another fall again today out in the yard.  He is complaining of right lateral rib pain, neck pain, right elbow pain, right shoulder pain and right hip pain.  His right rib pain is the worst pain.  He states that he also is having pain in his abdomen and his upper right quadrant of the abdomen.  Has been no vomiting of blood.  He states when he takes a deep breath it causes pain.  Pain is stabbing and sharp 10 out of 10 in the ribs.  Otherwise the remaining pain in the right shoulder, elbow, hip and neck is 5 out of 10.  No head injury no loss of consciousness.      REVIEW OF SYSTEMS    CONSTITUTIONAL:  No complaints of fever, chills,or weakness  EYES:  No complaints of discharge   ENT: No complaints of sore throat or ear pain  CARDIOVASCULAR:  No complaints of chest pain, palpitations, or swelling  RESPIRATORY:  No complaints of cough or shortness of breath  GI:  No complaints of abdominal pain, nausea, vomiting, or diarrhea  MUSCULOSKELETAL: Positive for right lateral rib, elbow, shoulder, hip, and neck discomfort   SKIN:  No complaints of rash  NEUROLOGIC:  No complaints of headache, focal weakness, or sensory changes  ENDOCRINE:  No complaints of polyuria or polydipsia  LYMPHATIC:  No complaints of swollen glands  GENITOURINARY: No complaints of urinary frequency or hematuria        PAST MEDICAL HISTORY  Past Medical History:   Diagnosis Date    Arthritis     Autonomic disease     CAD (coronary artery disease) 02/06/2017    Cervical radiculopathy 09/16/2021    Chronic constipation with acute exaccerbation 05/10/2021    Coronary artery disease     Degeneration of cervical intervertebral disc 08/11/2021    Diabetes mellitus     Diabetic foot ulcer  2020    Diabetic polyneuropathy associated with type 2 diabetes mellitus 2021    Elevated cholesterol     Gastroesophageal reflux disease 2019    Headache     HTN (hypertension), benign 2017    Hyperlipidemia     Hypertension     Mixed hyperlipidemia 2017    Multiple lung nodules 2020    5mm, 9 mm RLL identified 2020, not present 10/2019.    Myocardial infarction     Osteomyelitis 2020    Osteomyelitis of fifth toe of right foot 10/07/2019    Pancreatitis     Persistent insomnia 2020    Renal disorder     Sleep apnea 2017    Sleep apnea with use of continuous positive airway pressure (CPAP)     NON-COMPLIANT    Slow transit constipation 2019    Spinal stenosis in cervical region 2021    Vitamin D deficiency 2021       FAMILY HISTORY  Family History   Problem Relation Age of Onset    Colon cancer Father     Heart disease Father     Colon cancer Sister     Colon polyps Sister     Alzheimer's disease Mother     Coronary artery disease Sister     Coronary artery disease Sister        SOCIAL HISTORY  Social History     Socioeconomic History    Marital status:    Tobacco Use    Smoking status: Former     Current packs/day: 0.00     Types: Cigarettes     Quit date:      Years since quittin.8    Smokeless tobacco: Never    Tobacco comments:     smoked in [employer]   Vaping Use    Vaping status: Never Used   Substance and Sexual Activity    Alcohol use: No    Drug use: No    Sexual activity: Defer       IMMUNIZATION HISTORY  Deferred to primary care physician.    SURGICAL HISTORY  Past Surgical History:   Procedure Laterality Date    ABDOMINAL SURGERY      AMPUTATION FOOT / TOE Left 10/2021    5th digit     ANTERIOR CERVICAL DISCECTOMY W/ FUSION N/A 2022    Procedure: CERVICAL DISCECTOMY ANTERIOR WITH FUSION C5-6 with possible plating of C5-7 with neuromonitoring and 1 c-arm;  Surgeon: Karel Soliz MD;  Location: [facility] OR;   Service: Neurosurgery;  Laterality: N/A;    APPENDECTOMY      BACK SURGERY      CARDIAC CATHETERIZATION Left 02/08/2021    Procedure: Left Heart Cath w poss intervention left anatomical snuff box acess;  Surgeon: Omkar Charles DO;  Location: [facility] CATH INVASIVE LOCATION;  Service: Cardiology;  Laterality: Left;    CARDIAC SURGERY      CATARACT EXTRACTION      CERVICAL SPINE SURGERY      COLONOSCOPY N/A 01/31/2017    Normal exam repeat in 5 years    COLONOSCOPY N/A 02/11/2019    Mild acute inflammation    COLONOSCOPY N/A 04/07/2024    2 areas at 10 and 20 cm with friability ulceration 2 clips placed at 20 cm and 4 clips at 10 cm poor prep normal mucosa,mild eroisions and ulcerations in visible vessels    COLONOSCOPY N/A 7/1/2024    Procedure: COLONOSCOPY WITH ANESTHESIA;  Surgeon: Arsalan Lorenzo DO;  Location: [facility] ENDOSCOPY;  Service: Gastroenterology;  Laterality: N/A;  pre op constipation/diarrhea  post poor prep  pcp Del Shetty    COLONOSCOPY N/A 7/2/2024    Procedure: COLONOSCOPY WITH ANESTHESIA;  Surgeon: Agapito Christopher MD;  Location: [facility] [place] ENDOSCOPY;  Service: Gastroenterology;  Laterality: N/A;  pre rectal bleeding  post poor prep  pcp Del Shetty MD    COLONOSCOPY W/ POLYPECTOMY  03/04/2014    Hyperplastic polyp    CORONARY ARTERY BYPASS GRAFT  10/2015    ENDOSCOPY  04/13/2011    Gastritis with hemorrhage    ENDOSCOPY N/A 05/05/2017    Normal exam    ENDOSCOPY N/A 02/11/2019    Gastritis    ENDOSCOPY N/A 09/01/2020    Non-erosive gastritis with hemorrhage    ENDOSCOPY N/A 02/10/2021    Esophagitis    ENDOSCOPY N/A 04/11/2024    There were esophageal mucosal changes suspicious for short-segment Low's esophagus present in the distal esophagus. The maximum longitudinal extent of these mucosal changes was 2 cm in length. Mucosa was biopsied with a cold forceps for histologyDistal esophagus, biopsies: Mild chronic active esophagogastritis. No evidence of intestinal metaplasia,  dysplasia. Antrum, bx, Mild chronic gastritis    FOOT SURGERY Left     INCISION AND DRAINAGE OF WOUND Left 09/2007    spider bite       CURRENT MEDICATIONS  No current facility-administered medications for this encounter.    Current Outpatient Medications:     apixaban (ELIQUIS) 5 MG tablet tablet, Take 1 tablet by mouth 2 (Two) Times a Day., Disp: , Rfl:     apixaban (Eliquis) 5 MG tablet tablet, Take 1 tablet by mouth 2 (Two) Times a Day., Disp: 60 tablet, Rfl: 0    ascorbic acid (VITAMIN C) 1000 MG tablet, Take 1 tablet by mouth Daily., Disp: 30 tablet, Rfl: 3    bumetanide (BUMEX) 1 MG tablet, Take 1 tablet by mouth 2 (Two) Times a Day As Needed for weight gain >2 pounds in a day, Disp: 28 tablet, Rfl: 0    donepezil (ARICEPT) 10 MG tablet, Take 1 tablet by mouth every night at bedtime., Disp: 30 tablet, Rfl: 0    DULoxetine (CYMBALTA) 60 MG capsule, Take 1 capsule by mouth Daily., Disp: 90 capsule, Rfl: 4    DULoxetine (CYMBALTA) 60 MG capsule, Take 1 capsule by mouth Daily., Disp: 30 capsule, Rfl: 0    famotidine (PEPCID) 20 MG tablet, Take 1 tablet by mouth Every 12 (Twelve) Hours., Disp: 180 tablet, Rfl: 4    famotidine (PEPCID) 20 MG tablet, Take 1 tablet by mouth 2 (Two) Times a Day., Disp: 60 tablet, Rfl: 0    Insulin Glargine (Lantus SoloStar) 100 UNIT/ML injection pen, Inject 20 Units under the skin into the appropriate area as directed every night at bedtime., Disp: 15 mL, Rfl: 3    insulin glargine (Lantus) 100 UNIT/ML injection, Inject 35 Units under the skin into the appropriate area as directed every night at bedtime., Disp: 10 mL, Rfl: 0    Insulin Lispro (HumaLOG) 100 UNIT/ML injection, Inject subcutaneously four times a day per sliding scale:  0-150 = 0 units; 151-200 = 2u; 201-250 = 4u; 251-300 = 6u; 301-350 = 8u; 351-400 = 10u; 401+ = 12u, Disp: 10 mL, Rfl: 0    Insulin Regular Human, Conc, (HumuLIN R) 500 UNIT/ML solution pen-injector CONCENTRATED injection, Inject 40 Units under the skin  into the appropriate area as directed 2 (Two) Times a Day Before Meals. Inject 40 units under the skin in the the appropriate area with regular meals AND 40 units with large meals., Disp: , Rfl:     lisinopril (PRINIVIL,ZESTRIL) 5 MG tablet, Take 1 tablet by mouth Daily., Disp: 30 tablet, Rfl: 0    melatonin 3 MG tablet, Take 2 tablets by mouth At Night As Needed for Sleep., Disp: 30 tablet, Rfl: 0    nitroglycerin (NITROSTAT) 0.4 MG SL tablet, Place 1 tablet under the tongue Every 5 (Five) Minutes As Needed for Chest Pain. Take no more than 3 doses in 15 minutes., Disp: , Rfl:     oxyCODONE-acetaminophen (PERCOCET) 5-325 MG per tablet, Take 1 tablet by mouth Every 6 (Six) Hours As Needed for Moderate Pain., Disp: 6 tablet, Rfl: 0    pantoprazole (PROTONIX) 40 MG EC tablet, Take 1 tablet by mouth Every 12 (Twelve) Hours., Disp: 180 tablet, Rfl: 4    pantoprazole (PROTONIX) 40 MG EC tablet, Take 1 tablet by mouth 2 (Two) Times a Day., Disp: 60 tablet, Rfl: 0    polyethylene glycol (MIRALAX) 17 GM/SCOOP powder, Take 17 g by mouth Daily As Needed (constipation)., Disp: , Rfl:     potassium chloride ER (K-TAB) 20 MEQ tablet controlled-release ER tablet, Take 1 tablet by mouth Daily., Disp: 30 tablet, Rfl: 0    pregabalin (LYRICA) 100 MG capsule, Take 1 capsule by mouth Every Night., Disp: 6 capsule, Rfl: 0    pregabalin (LYRICA) 50 MG capsule, Take 1 capsule by mouth Daily., Disp: 6 capsule, Rfl: 0    rosuvastatin (CRESTOR) 10 MG tablet, Take 1 tablet by mouth Daily., Disp: 30 tablet, Rfl: 0    sennosides-docusate (PERICOLACE) 8.6-50 MG per tablet, Take 1 tablet by mouth Every Night. Obtain OTC, Disp: , Rfl:     sertraline (ZOLOFT) 25 MG tablet, Take 1 tablet by mouth Daily, Disp: 30 tablet, Rfl: 0    sodium bicarbonate 650 MG tablet, Take 1 tablet by mouth 3 (Three) Times a Day., Disp: , Rfl:     ALLERGIES  Allergies   Allergen Reactions    Cefepime Hives and Anaphylaxis    Bactrim [Sulfamethoxazole-Trimethoprim] Other  "(See Comments)     \"RENAL FAILURE\"    Vancomycin Itching    Zolpidem Mental Status Change     \"makes him crazy\"    Metronidazole Rash       Musculoskeletal exam    VITAL SIGNS:   /85 (BP Location: Left arm, Patient Position: Lying)   Pulse 98   Temp 98.2 °F (36.8 °C)   Resp 18   Ht 182.9 cm (72\")   Wt 124 kg (273 lb 3.2 oz)   SpO2 94%   BMI 37.05 kg/m²     Constitutional: Patient is alert and in no distress.  Patient with mild neck, moderate right shoulder, right elbow, right hip and severe right-sided rib discomfort.    ENT: There is a normal pharynx with no acute erythema or exudate and oral mucosa is moist.  Nose is clear with no drainage.  Tympanic membranes intact and nonerythemic    Cardiovascular: S1-S2 regular rate and rhythm. No murmurs, rubs or gallops are noted.    Respiratory: Patient is clear to auscultation bilaterally with no wheezing or rhonchi.  Chest wall is tender to palpation over the right lateral rib cage level 6 7 and 8.  No crepitance..  There are no external lesions on the chest.      Abdomen: Soft, nontender. Bowel sounds are normal in all 4 quadrants. There is no rebound or guarding noted.  There is no abdominal distention or hepatosplenomegaly.    Neck: Mild posterior tenderness palpation paralateral cervical spine but no obvious step-off noted.    Back: No tenderness palpation of the thoracic or lumbar spines.  No step-off.    Musculoskeletal:     Right shoulder: Is tender palpation of the anterior shoulder and AC joint.  No gross deformity.  Worsening pain with Neer's and Dee maneuvers.  Worsening pain with abduction versus abduction.    Right elbow: Tender palpation over the radial head area of the right elbow.  No effusion.  No gross deformity.  Patient is able to she extend to 180 degrees.    Right hip: Tender palpation over the right lateral hip there is no leg length discrepancy.  Patient is able to flex at the hip with some pain.  No significant pain with internal " rotation mild with external rotation.        Integumentary: No acute changes noted    Genitourinary: Patient is voiding appropriately.    Psychiatric: Normal affect and mood      RADIOLOGY/PROCEDURES        XR Ribs Right With PA Chest   Final Result   1. Minimally displaced right lateral seventh rib fracture.   2. Lungs are clear and well expanded. No pneumothorax.           This report was signed and finalized on 10/20/2024 8:12 PM by Dr King Ceballos.          CT Chest Without Contrast Diagnostic   Final Result   1.  Right lateral fifth through eighth rib fractures. The seventh rib   fracture is minimally displaced. The other fractures are nondisplaced.   2.  Bilateral moderate pleural effusions.   3.  Previous coronary bypass.               This report was signed and finalized on 10/20/2024 8:28 PM by Dr King Ceballos.          CT Abdomen Pelvis Without Contrast   Final Result       1.  No acute process in the abdomen or pelvis.   2.  Fat-containing right inguinal hernia.       This report was signed and finalized on 10/20/2024 8:19 PM by Dr King Ceballos.          CT Cervical Spine Without Contrast   Final Result   1. No evidence of acute osseous injury or traumatic malalignment in the   cervical spine.               This report was signed and finalized on 10/20/2024 8:21 PM by Dr King Ceballos.          XR Shoulder 2+ View Right   Final Result   Impression:    1. No acute osseous injury or malalignment of the right shoulder.               This report was signed and finalized on 10/20/2024 8:05 PM by Dr King Ceballos.          XR Elbow 3+ View Right   Final Result   Impression:    1. No acute fracture or malalignment at the elbow.               This report was signed and finalized on 10/20/2024 8:10 PM by Dr King Ceballos.          XR Hip With or Without Pelvis 2 - 3 View Right   Final Result   1.  No visualized acute fracture or malalignment.               This report was signed and finalized on  10/20/2024 8:07 PM by Dr King Ceballos.                 FUTURE APPOINTMENTS     No future appointments.         COURSE & MEDICAL DECISION MAKING    Patient's partial differential diagnosis can include:    Cervical sprain, cervical fracture, rib contusion, rib fracture, elbow sprain, elbow fracture, shoulder sprain, shoulder separation, shoulder fracture, hip contusion, hip fracture, hip bursitis, and others    Pain improved after IM shot of morphine and Zofran ODT.    Discussed with the patient results of his radiological test which does show multi rib fracture nondisplaced on the right side ribs 5 through 8.  Patient already has Percocet at home for pain control.  There is no fracture or subluxation of the cervical spine although did show degenerative disc disease.  There is right elbow hip and shoulder had no fractures or dislocations noted.  The abdomen and pelvis CT and the chest CT did not have any acute findings minus the exception of the aforementioned rib fractures on the right #5-8.      Patient will be discharged home all questions answered.  If pain medication is not controlling his pain at home he needs to discuss with the person that is writing his current pain medications      Patient's level of risk: Moderate      CRITICAL CARE    CRITICAL CARE: No    CRITICAL CARE TIME: None      No results found for this or any previous visit (from the past 24 hours).           Also  Old charts were reviewed per [facility] EMR.  Pertinent details are summarized above.  All laboratory, radiologic, and EKG studies that were performed in the Emergency Department were a necessary part of the evaluation needed to exclude unstable or  emergent medical conditions.     Patient was hemodynamically and neurologically stable in the ED.   Pertinent studies were reviewed as above.     The patient received:  Medications   morphine injection 4 mg (4 mg Intramuscular Given 10/20/24 2032)   ondansetron ODT (ZOFRAN-ODT) disintegrating  tablet 4 mg (4 mg Oral Given 10/20/24 2032)            ED Disposition       ED Disposition   Discharge    Condition   Stable    Comment   --                 Dragon disclaimer:  Part of this note may be an electronic transcription/translation of spoken language to printed text using the Dragon Dictation System.     I have reviewed the patient’s prescription history via a prescription monitoring program.  This information is consistent with my knowledge of the patient’s controlled substance use history.    Patient evaluated during Coronavirus Pandemic. Isolation practices followed according to [facility] policy.    FINAL IMPRESSION   Diagnosis Plan   1. Closed fracture of multiple ribs of right side, initial encounter      5th through 8th      2. Fall on same level, initial encounter        3. Neck sprain, initial encounter        4. Sprain of right shoulder, unspecified shoulder sprain type, initial encounter        5. Elbow sprain, right, initial encounter        6. Contusion of right hip, initial encounter              MD Jesse Rios Jr, Thomas Mark Jr., MD  10/20/24 2108       Karel Molina Jr., MD  10/20/24 2110

## 2024-10-21 NOTE — DISCHARGE INSTRUCTIONS
Would recommend continued physical therapy or back into rehab if you continue to have difficulty with walking and frequent falls.  If your pain medication is not strong enough to take care of your pain you need to discuss this with your doctor who currently writes your pain medication.

## 2024-11-17 ENCOUNTER — HOSPITAL ENCOUNTER (INPATIENT)
Facility: HOSPITAL | Age: 69
LOS: 12 days | Discharge: HOME | DRG: 682 | End: 2024-12-01
Attending: EMERGENCY MEDICINE | Admitting: STUDENT IN AN ORGANIZED HEALTH CARE EDUCATION/TRAINING PROGRAM
Payer: MEDICARE

## 2024-11-17 ENCOUNTER — APPOINTMENT (OUTPATIENT)
Dept: GENERAL RADIOLOGY | Facility: HOSPITAL | Age: 69
DRG: 682 | End: 2024-11-17
Payer: MEDICARE

## 2024-11-17 DIAGNOSIS — N18.9 ACUTE RENAL FAILURE SUPERIMPOSED ON CHRONIC KIDNEY DISEASE, UNSPECIFIED ACUTE RENAL FAILURE TYPE, UNSPECIFIED CKD STAGE: ICD-10-CM

## 2024-11-17 DIAGNOSIS — N17.9 ACUTE RENAL FAILURE SUPERIMPOSED ON CHRONIC KIDNEY DISEASE, UNSPECIFIED ACUTE RENAL FAILURE TYPE, UNSPECIFIED CKD STAGE: ICD-10-CM

## 2024-11-17 DIAGNOSIS — R73.9 ACUTE HYPERGLYCEMIA: ICD-10-CM

## 2024-11-17 DIAGNOSIS — R55 SYNCOPE AND COLLAPSE: ICD-10-CM

## 2024-11-17 DIAGNOSIS — E11.628 DIABETIC FOOT INFECTION: ICD-10-CM

## 2024-11-17 DIAGNOSIS — Z74.09 IMPAIRED FUNCTIONAL MOBILITY AND ACTIVITY TOLERANCE: ICD-10-CM

## 2024-11-17 DIAGNOSIS — N39.0 ACUTE UTI (URINARY TRACT INFECTION): Primary | ICD-10-CM

## 2024-11-17 DIAGNOSIS — L08.9 DIABETIC FOOT INFECTION: ICD-10-CM

## 2024-11-17 DIAGNOSIS — I50.43 CHF (CONGESTIVE HEART FAILURE), NYHA CLASS I, ACUTE ON CHRONIC, COMBINED: ICD-10-CM

## 2024-11-17 LAB
ALBUMIN SERPL-MCNC: 3.5 G/DL (ref 3.5–5.2)
ALBUMIN/GLOB SERPL: 1.3 G/DL
ALP SERPL-CCNC: 134 U/L (ref 39–117)
ALT SERPL W P-5'-P-CCNC: 19 U/L (ref 1–41)
ANION GAP SERPL CALCULATED.3IONS-SCNC: 13 MMOL/L (ref 5–15)
AST SERPL-CCNC: 13 U/L (ref 1–40)
B PARAPERT DNA SPEC QL NAA+PROBE: NOT DETECTED
B PERT DNA SPEC QL NAA+PROBE: NOT DETECTED
BACTERIA UR QL AUTO: ABNORMAL /HPF
BASOPHILS # BLD AUTO: 0.05 10*3/MM3 (ref 0–0.2)
BASOPHILS NFR BLD AUTO: 0.5 % (ref 0–1.5)
BILIRUB SERPL-MCNC: 0.3 MG/DL (ref 0–1.2)
BILIRUB UR QL STRIP: NEGATIVE
BUN SERPL-MCNC: 53 MG/DL (ref 8–23)
BUN/CREAT SERPL: 18.7 (ref 7–25)
C PNEUM DNA NPH QL NAA+NON-PROBE: NOT DETECTED
CALCIUM SPEC-SCNC: 8.6 MG/DL (ref 8.6–10.5)
CHLORIDE SERPL-SCNC: 106 MMOL/L (ref 98–107)
CLARITY UR: ABNORMAL
CO2 SERPL-SCNC: 19 MMOL/L (ref 22–29)
COLOR UR: YELLOW
CREAT SERPL-MCNC: 2.83 MG/DL (ref 0.76–1.27)
DEPRECATED RDW RBC AUTO: 49.5 FL (ref 37–54)
DEVELOPER EXPIRATION DATE: NORMAL
DEVELOPER LOT NUMBER: 275
EGFRCR SERPLBLD CKD-EPI 2021: 23.5 ML/MIN/1.73
EOSINOPHIL # BLD AUTO: 0.29 10*3/MM3 (ref 0–0.4)
EOSINOPHIL NFR BLD AUTO: 2.7 % (ref 0.3–6.2)
ERYTHROCYTE [DISTWIDTH] IN BLOOD BY AUTOMATED COUNT: 15.8 % (ref 12.3–15.4)
EXPIRATION DATE: NORMAL
FECAL OCCULT BLOOD SCREEN, POC: NEGATIVE
FLUAV SUBTYP SPEC NAA+PROBE: NOT DETECTED
FLUBV RNA ISLT QL NAA+PROBE: NOT DETECTED
GLOBULIN UR ELPH-MCNC: 2.8 GM/DL
GLUCOSE SERPL-MCNC: 348 MG/DL (ref 65–99)
GLUCOSE UR STRIP-MCNC: ABNORMAL MG/DL
HADV DNA SPEC NAA+PROBE: NOT DETECTED
HCOV 229E RNA SPEC QL NAA+PROBE: NOT DETECTED
HCOV HKU1 RNA SPEC QL NAA+PROBE: NOT DETECTED
HCOV NL63 RNA SPEC QL NAA+PROBE: NOT DETECTED
HCOV OC43 RNA SPEC QL NAA+PROBE: NOT DETECTED
HCT VFR BLD AUTO: 30.5 % (ref 37.5–51)
HGB BLD-MCNC: 10.2 G/DL (ref 13–17.7)
HGB UR QL STRIP.AUTO: ABNORMAL
HMPV RNA NPH QL NAA+NON-PROBE: NOT DETECTED
HPIV1 RNA ISLT QL NAA+PROBE: NOT DETECTED
HPIV2 RNA SPEC QL NAA+PROBE: NOT DETECTED
HPIV3 RNA NPH QL NAA+PROBE: NOT DETECTED
HPIV4 P GENE NPH QL NAA+PROBE: NOT DETECTED
HYALINE CASTS UR QL AUTO: ABNORMAL /LPF
IMM GRANULOCYTES # BLD AUTO: 0.04 10*3/MM3 (ref 0–0.05)
IMM GRANULOCYTES NFR BLD AUTO: 0.4 % (ref 0–0.5)
KETONES UR QL STRIP: NEGATIVE
LEUKOCYTE ESTERASE UR QL STRIP.AUTO: ABNORMAL
LIPASE SERPL-CCNC: 11 U/L (ref 13–60)
LYMPHOCYTES # BLD AUTO: 1.34 10*3/MM3 (ref 0.7–3.1)
LYMPHOCYTES NFR BLD AUTO: 12.6 % (ref 19.6–45.3)
Lab: 275
M PNEUMO IGG SER IA-ACNC: NOT DETECTED
MAGNESIUM SERPL-MCNC: 2 MG/DL (ref 1.6–2.4)
MCH RBC QN AUTO: 28.5 PG (ref 26.6–33)
MCHC RBC AUTO-ENTMCNC: 33.4 G/DL (ref 31.5–35.7)
MCV RBC AUTO: 85.2 FL (ref 79–97)
MONOCYTES # BLD AUTO: 0.91 10*3/MM3 (ref 0.1–0.9)
MONOCYTES NFR BLD AUTO: 8.6 % (ref 5–12)
NEGATIVE CONTROL: NEGATIVE
NEUTROPHILS NFR BLD AUTO: 75.2 % (ref 42.7–76)
NEUTROPHILS NFR BLD AUTO: 8.01 10*3/MM3 (ref 1.7–7)
NITRITE UR QL STRIP: POSITIVE
NRBC BLD AUTO-RTO: 0 /100 WBC (ref 0–0.2)
PH UR STRIP.AUTO: 5.5 [PH] (ref 5–8)
PLATELET # BLD AUTO: 255 10*3/MM3 (ref 140–450)
PMV BLD AUTO: 11.7 FL (ref 6–12)
POSITIVE CONTROL: POSITIVE
POTASSIUM SERPL-SCNC: 4.4 MMOL/L (ref 3.5–5.2)
PROT SERPL-MCNC: 6.3 G/DL (ref 6–8.5)
PROT UR QL STRIP: ABNORMAL
RBC # BLD AUTO: 3.58 10*6/MM3 (ref 4.14–5.8)
RBC # UR STRIP: ABNORMAL /HPF
REF LAB TEST METHOD: ABNORMAL
RHINOVIRUS RNA SPEC NAA+PROBE: NOT DETECTED
RSV RNA NPH QL NAA+NON-PROBE: NOT DETECTED
SARS-COV-2 RNA NPH QL NAA+NON-PROBE: NOT DETECTED
SODIUM SERPL-SCNC: 138 MMOL/L (ref 136–145)
SP GR UR STRIP: 1.02 (ref 1–1.03)
SQUAMOUS #/AREA URNS HPF: ABNORMAL /HPF
UROBILINOGEN UR QL STRIP: ABNORMAL
WBC # UR STRIP: ABNORMAL /HPF
WBC NRBC COR # BLD AUTO: 10.64 10*3/MM3 (ref 3.4–10.8)

## 2024-11-17 PROCEDURE — 25010000002 LEVOFLOXACIN PER 250 MG: Performed by: EMERGENCY MEDICINE

## 2024-11-17 PROCEDURE — 63710000001 INSULIN REGULAR HUMAN PER 5 UNITS: Performed by: EMERGENCY MEDICINE

## 2024-11-17 PROCEDURE — 71045 X-RAY EXAM CHEST 1 VIEW: CPT

## 2024-11-17 PROCEDURE — 87186 SC STD MICRODIL/AGAR DIL: CPT | Performed by: EMERGENCY MEDICINE

## 2024-11-17 PROCEDURE — 83690 ASSAY OF LIPASE: CPT | Performed by: EMERGENCY MEDICINE

## 2024-11-17 PROCEDURE — 87181 SC STD AGAR DILUTION PER AGT: CPT | Performed by: EMERGENCY MEDICINE

## 2024-11-17 PROCEDURE — 36415 COLL VENOUS BLD VENIPUNCTURE: CPT

## 2024-11-17 PROCEDURE — 85025 COMPLETE CBC W/AUTO DIFF WBC: CPT | Performed by: EMERGENCY MEDICINE

## 2024-11-17 PROCEDURE — 87040 BLOOD CULTURE FOR BACTERIA: CPT | Performed by: EMERGENCY MEDICINE

## 2024-11-17 PROCEDURE — 87077 CULTURE AEROBIC IDENTIFY: CPT | Performed by: EMERGENCY MEDICINE

## 2024-11-17 PROCEDURE — 83605 ASSAY OF LACTIC ACID: CPT | Performed by: EMERGENCY MEDICINE

## 2024-11-17 PROCEDURE — 83735 ASSAY OF MAGNESIUM: CPT | Performed by: EMERGENCY MEDICINE

## 2024-11-17 PROCEDURE — 82270 OCCULT BLOOD FECES: CPT | Performed by: EMERGENCY MEDICINE

## 2024-11-17 PROCEDURE — 81001 URINALYSIS AUTO W/SCOPE: CPT | Performed by: EMERGENCY MEDICINE

## 2024-11-17 PROCEDURE — 80053 COMPREHEN METABOLIC PANEL: CPT | Performed by: EMERGENCY MEDICINE

## 2024-11-17 PROCEDURE — 99285 EMERGENCY DEPT VISIT HI MDM: CPT

## 2024-11-17 PROCEDURE — 25810000003 SODIUM CHLORIDE 0.9 % SOLUTION: Performed by: EMERGENCY MEDICINE

## 2024-11-17 PROCEDURE — 87086 URINE CULTURE/COLONY COUNT: CPT | Performed by: EMERGENCY MEDICINE

## 2024-11-17 PROCEDURE — 0202U NFCT DS 22 TRGT SARS-COV-2: CPT | Performed by: EMERGENCY MEDICINE

## 2024-11-17 RX ORDER — SODIUM CHLORIDE 0.9 % (FLUSH) 0.9 %
10 SYRINGE (ML) INJECTION AS NEEDED
Status: DISCONTINUED | OUTPATIENT
Start: 2024-11-17 | End: 2024-11-18

## 2024-11-17 RX ORDER — LEVOFLOXACIN 5 MG/ML
750 INJECTION, SOLUTION INTRAVENOUS ONCE
Status: COMPLETED | OUTPATIENT
Start: 2024-11-17 | End: 2024-11-18

## 2024-11-17 RX ADMIN — LEVOFLOXACIN 750 MG: 5 INJECTION, SOLUTION INTRAVENOUS at 23:52

## 2024-11-17 RX ADMIN — SODIUM CHLORIDE 1000 ML: 9 INJECTION, SOLUTION INTRAVENOUS at 21:55

## 2024-11-17 RX ADMIN — INSULIN HUMAN 10 UNITS: 100 INJECTION, SOLUTION PARENTERAL at 23:52

## 2024-11-18 ENCOUNTER — APPOINTMENT (OUTPATIENT)
Dept: ULTRASOUND IMAGING | Facility: HOSPITAL | Age: 69
DRG: 682 | End: 2024-11-18
Payer: MEDICARE

## 2024-11-18 PROBLEM — Z79.4 TYPE 2 DIABETES MELLITUS WITH HYPERGLYCEMIA, WITH LONG-TERM CURRENT USE OF INSULIN: Status: ACTIVE | Noted: 2024-11-18

## 2024-11-18 PROBLEM — G93.40 ACUTE ENCEPHALOPATHY: Status: ACTIVE | Noted: 2024-11-18

## 2024-11-18 PROBLEM — E11.65 TYPE 2 DIABETES MELLITUS WITH HYPERGLYCEMIA, WITH LONG-TERM CURRENT USE OF INSULIN: Status: ACTIVE | Noted: 2024-11-18

## 2024-11-18 LAB
ANION GAP SERPL CALCULATED.3IONS-SCNC: 16 MMOL/L (ref 5–15)
BASOPHILS # BLD AUTO: 0.06 10*3/MM3 (ref 0–0.2)
BASOPHILS NFR BLD AUTO: 0.6 % (ref 0–1.5)
BUN SERPL-MCNC: 49 MG/DL (ref 8–23)
BUN/CREAT SERPL: 18.6 (ref 7–25)
CALCIUM SPEC-SCNC: 8.6 MG/DL (ref 8.6–10.5)
CHLORIDE SERPL-SCNC: 108 MMOL/L (ref 98–107)
CO2 SERPL-SCNC: 16 MMOL/L (ref 22–29)
CREAT SERPL-MCNC: 2.63 MG/DL (ref 0.76–1.27)
D-LACTATE SERPL-SCNC: 1.3 MMOL/L (ref 0.5–2)
DEPRECATED RDW RBC AUTO: 47.8 FL (ref 37–54)
EGFRCR SERPLBLD CKD-EPI 2021: 25.7 ML/MIN/1.73
EOSINOPHIL # BLD AUTO: 0.28 10*3/MM3 (ref 0–0.4)
EOSINOPHIL NFR BLD AUTO: 3 % (ref 0.3–6.2)
ERYTHROCYTE [DISTWIDTH] IN BLOOD BY AUTOMATED COUNT: 15.6 % (ref 12.3–15.4)
GLUCOSE BLDC GLUCOMTR-MCNC: 122 MG/DL (ref 70–130)
GLUCOSE BLDC GLUCOMTR-MCNC: 167 MG/DL (ref 70–130)
GLUCOSE BLDC GLUCOMTR-MCNC: 76 MG/DL (ref 70–130)
GLUCOSE BLDC GLUCOMTR-MCNC: 81 MG/DL (ref 70–130)
GLUCOSE BLDC GLUCOMTR-MCNC: 93 MG/DL (ref 70–130)
GLUCOSE SERPL-MCNC: 106 MG/DL (ref 65–99)
HCT VFR BLD AUTO: 29.3 % (ref 37.5–51)
HGB BLD-MCNC: 9.7 G/DL (ref 13–17.7)
IMM GRANULOCYTES # BLD AUTO: 0.05 10*3/MM3 (ref 0–0.05)
IMM GRANULOCYTES NFR BLD AUTO: 0.5 % (ref 0–0.5)
LYMPHOCYTES # BLD AUTO: 1.65 10*3/MM3 (ref 0.7–3.1)
LYMPHOCYTES NFR BLD AUTO: 17.6 % (ref 19.6–45.3)
MCH RBC QN AUTO: 28.2 PG (ref 26.6–33)
MCHC RBC AUTO-ENTMCNC: 33.1 G/DL (ref 31.5–35.7)
MCV RBC AUTO: 85.2 FL (ref 79–97)
MONOCYTES # BLD AUTO: 0.98 10*3/MM3 (ref 0.1–0.9)
MONOCYTES NFR BLD AUTO: 10.5 % (ref 5–12)
NEUTROPHILS NFR BLD AUTO: 6.35 10*3/MM3 (ref 1.7–7)
NEUTROPHILS NFR BLD AUTO: 67.8 % (ref 42.7–76)
NRBC BLD AUTO-RTO: 0 /100 WBC (ref 0–0.2)
PLATELET # BLD AUTO: 242 10*3/MM3 (ref 140–450)
PMV BLD AUTO: 12.3 FL (ref 6–12)
POTASSIUM SERPL-SCNC: 3.5 MMOL/L (ref 3.5–5.2)
RBC # BLD AUTO: 3.44 10*6/MM3 (ref 4.14–5.8)
SODIUM SERPL-SCNC: 140 MMOL/L (ref 136–145)
WBC NRBC COR # BLD AUTO: 9.37 10*3/MM3 (ref 3.4–10.8)

## 2024-11-18 PROCEDURE — 25810000003 SODIUM CHLORIDE 0.9 % SOLUTION: Performed by: INTERNAL MEDICINE

## 2024-11-18 PROCEDURE — 25010000002 ONDANSETRON PER 1 MG: Performed by: FAMILY MEDICINE

## 2024-11-18 PROCEDURE — 25810000003 SODIUM CHLORIDE 0.9 % SOLUTION: Performed by: FAMILY MEDICINE

## 2024-11-18 PROCEDURE — 36415 COLL VENOUS BLD VENIPUNCTURE: CPT | Performed by: EMERGENCY MEDICINE

## 2024-11-18 PROCEDURE — 63710000001 INSULIN LISPRO (HUMAN) PER 5 UNITS: Performed by: FAMILY MEDICINE

## 2024-11-18 PROCEDURE — 25010000002 ERTAPENEM PER 500 MG: Performed by: FAMILY MEDICINE

## 2024-11-18 PROCEDURE — 99222 1ST HOSP IP/OBS MODERATE 55: CPT | Performed by: NURSE PRACTITIONER

## 2024-11-18 PROCEDURE — G0378 HOSPITAL OBSERVATION PER HR: HCPCS

## 2024-11-18 PROCEDURE — 82948 REAGENT STRIP/BLOOD GLUCOSE: CPT

## 2024-11-18 PROCEDURE — 87040 BLOOD CULTURE FOR BACTERIA: CPT | Performed by: EMERGENCY MEDICINE

## 2024-11-18 PROCEDURE — 97162 PT EVAL MOD COMPLEX 30 MIN: CPT

## 2024-11-18 PROCEDURE — 80048 BASIC METABOLIC PNL TOTAL CA: CPT | Performed by: FAMILY MEDICINE

## 2024-11-18 PROCEDURE — 85025 COMPLETE CBC W/AUTO DIFF WBC: CPT | Performed by: FAMILY MEDICINE

## 2024-11-18 PROCEDURE — 63710000001 INSULIN GLARGINE PER 5 UNITS: Performed by: FAMILY MEDICINE

## 2024-11-18 RX ORDER — MULTIPLE VITAMINS W/ MINERALS TAB 9MG-400MCG
1 TAB ORAL DAILY
Status: DISCONTINUED | OUTPATIENT
Start: 2024-11-19 | End: 2024-11-28

## 2024-11-18 RX ORDER — TAMSULOSIN HYDROCHLORIDE 0.4 MG/1
0.4 CAPSULE ORAL DAILY
Status: DISCONTINUED | OUTPATIENT
Start: 2024-11-18 | End: 2024-12-01 | Stop reason: HOSPADM

## 2024-11-18 RX ORDER — NICOTINE POLACRILEX 4 MG
15 LOZENGE BUCCAL
Status: DISCONTINUED | OUTPATIENT
Start: 2024-11-18 | End: 2024-12-01 | Stop reason: HOSPADM

## 2024-11-18 RX ORDER — ZINC SULFATE 50(220)MG
220 CAPSULE ORAL DAILY
Status: DISPENSED | OUTPATIENT
Start: 2024-11-19 | End: 2024-11-29

## 2024-11-18 RX ORDER — ACETAMINOPHEN 325 MG/1
650 TABLET ORAL EVERY 4 HOURS PRN
Status: DISCONTINUED | OUTPATIENT
Start: 2024-11-18 | End: 2024-12-01 | Stop reason: HOSPADM

## 2024-11-18 RX ORDER — BISACODYL 5 MG/1
5 TABLET, DELAYED RELEASE ORAL DAILY PRN
Status: DISCONTINUED | OUTPATIENT
Start: 2024-11-18 | End: 2024-12-01 | Stop reason: HOSPADM

## 2024-11-18 RX ORDER — POTASSIUM CHLORIDE 750 MG/1
40 CAPSULE, EXTENDED RELEASE ORAL EVERY 4 HOURS
Status: COMPLETED | OUTPATIENT
Start: 2024-11-18 | End: 2024-11-18

## 2024-11-18 RX ORDER — PREGABALIN 100 MG/1
100 CAPSULE ORAL DAILY
COMMUNITY
End: 2024-12-10 | Stop reason: HOSPADM

## 2024-11-18 RX ORDER — FAMOTIDINE 20 MG/1
20 TABLET, FILM COATED ORAL
Status: DISCONTINUED | OUTPATIENT
Start: 2024-11-18 | End: 2024-11-18

## 2024-11-18 RX ORDER — IBUPROFEN 600 MG/1
1 TABLET ORAL
Status: DISCONTINUED | OUTPATIENT
Start: 2024-11-18 | End: 2024-12-01 | Stop reason: HOSPADM

## 2024-11-18 RX ORDER — DULOXETIN HYDROCHLORIDE 30 MG/1
60 CAPSULE, DELAYED RELEASE ORAL DAILY
Status: DISCONTINUED | OUTPATIENT
Start: 2024-11-18 | End: 2024-11-18

## 2024-11-18 RX ORDER — INSULIN LISPRO 100 [IU]/ML
10 INJECTION, SOLUTION INTRAVENOUS; SUBCUTANEOUS
Status: DISCONTINUED | OUTPATIENT
Start: 2024-11-18 | End: 2024-12-01 | Stop reason: HOSPADM

## 2024-11-18 RX ORDER — BUMETANIDE 1 MG/1
2 TABLET ORAL
Status: DISCONTINUED | OUTPATIENT
Start: 2024-11-18 | End: 2024-11-20

## 2024-11-18 RX ORDER — SODIUM CHLORIDE 0.9 % (FLUSH) 0.9 %
10 SYRINGE (ML) INJECTION AS NEEDED
Status: DISCONTINUED | OUTPATIENT
Start: 2024-11-18 | End: 2024-12-01 | Stop reason: HOSPADM

## 2024-11-18 RX ORDER — SODIUM CHLORIDE 9 MG/ML
100 INJECTION, SOLUTION INTRAVENOUS CONTINUOUS
Status: DISPENSED | OUTPATIENT
Start: 2024-11-18 | End: 2024-11-20

## 2024-11-18 RX ORDER — PREGABALIN 75 MG/1
75 CAPSULE ORAL NIGHTLY
Status: DISCONTINUED | OUTPATIENT
Start: 2024-11-18 | End: 2024-12-01 | Stop reason: HOSPADM

## 2024-11-18 RX ORDER — NITROGLYCERIN 0.4 MG/1
0.4 TABLET SUBLINGUAL
Status: DISCONTINUED | OUTPATIENT
Start: 2024-11-18 | End: 2024-12-01 | Stop reason: HOSPADM

## 2024-11-18 RX ORDER — INSULIN LISPRO 100 [IU]/ML
2-12 INJECTION, SOLUTION INTRAVENOUS; SUBCUTANEOUS
COMMUNITY

## 2024-11-18 RX ORDER — ROSUVASTATIN CALCIUM 10 MG/1
10 TABLET, COATED ORAL DAILY
Status: DISCONTINUED | OUTPATIENT
Start: 2024-11-18 | End: 2024-12-01 | Stop reason: HOSPADM

## 2024-11-18 RX ORDER — DONEPEZIL HYDROCHLORIDE 5 MG/1
10 TABLET, FILM COATED ORAL NIGHTLY
Status: DISCONTINUED | OUTPATIENT
Start: 2024-11-18 | End: 2024-12-01 | Stop reason: HOSPADM

## 2024-11-18 RX ORDER — INSULIN LISPRO 100 [IU]/ML
2-9 INJECTION, SOLUTION INTRAVENOUS; SUBCUTANEOUS
Status: DISCONTINUED | OUTPATIENT
Start: 2024-11-18 | End: 2024-12-01 | Stop reason: HOSPADM

## 2024-11-18 RX ORDER — SODIUM CHLORIDE 0.9 % (FLUSH) 0.9 %
10 SYRINGE (ML) INJECTION EVERY 12 HOURS SCHEDULED
Status: DISCONTINUED | OUTPATIENT
Start: 2024-11-18 | End: 2024-12-01 | Stop reason: HOSPADM

## 2024-11-18 RX ORDER — SODIUM CHLORIDE 9 MG/ML
50 INJECTION, SOLUTION INTRAVENOUS CONTINUOUS
Status: DISPENSED | OUTPATIENT
Start: 2024-11-18 | End: 2024-11-18

## 2024-11-18 RX ORDER — PREGABALIN 100 MG/1
100 CAPSULE ORAL NIGHTLY
Status: DISCONTINUED | OUTPATIENT
Start: 2024-11-18 | End: 2024-11-18

## 2024-11-18 RX ORDER — AMOXICILLIN 250 MG
2 CAPSULE ORAL 2 TIMES DAILY PRN
Status: DISCONTINUED | OUTPATIENT
Start: 2024-11-18 | End: 2024-11-28

## 2024-11-18 RX ORDER — OXYCODONE AND ACETAMINOPHEN 5; 325 MG/1; MG/1
1 TABLET ORAL EVERY 4 HOURS PRN
Status: DISPENSED | OUTPATIENT
Start: 2024-11-18 | End: 2024-11-25

## 2024-11-18 RX ORDER — ACETAMINOPHEN 160 MG/5ML
650 SOLUTION ORAL EVERY 4 HOURS PRN
Status: DISCONTINUED | OUTPATIENT
Start: 2024-11-18 | End: 2024-12-01 | Stop reason: HOSPADM

## 2024-11-18 RX ORDER — POLYETHYLENE GLYCOL 3350 17 G/17G
17 POWDER, FOR SOLUTION ORAL DAILY PRN
Status: DISCONTINUED | OUTPATIENT
Start: 2024-11-18 | End: 2024-11-28

## 2024-11-18 RX ORDER — OXYCODONE AND ACETAMINOPHEN 5; 325 MG/1; MG/1
1 TABLET ORAL EVERY 6 HOURS PRN
Status: DISCONTINUED | OUTPATIENT
Start: 2024-11-18 | End: 2024-11-18

## 2024-11-18 RX ORDER — LEVOFLOXACIN 5 MG/ML
750 INJECTION, SOLUTION INTRAVENOUS
Status: DISCONTINUED | OUTPATIENT
Start: 2024-11-18 | End: 2024-11-18

## 2024-11-18 RX ORDER — LEVOFLOXACIN 5 MG/ML
750 INJECTION, SOLUTION INTRAVENOUS
Status: DISCONTINUED | OUTPATIENT
Start: 2024-11-19 | End: 2024-11-18

## 2024-11-18 RX ORDER — ACETAMINOPHEN 650 MG/1
650 SUPPOSITORY RECTAL EVERY 4 HOURS PRN
Status: DISCONTINUED | OUTPATIENT
Start: 2024-11-18 | End: 2024-12-01 | Stop reason: HOSPADM

## 2024-11-18 RX ORDER — DULOXETIN HYDROCHLORIDE 30 MG/1
30 CAPSULE, DELAYED RELEASE ORAL DAILY
Status: DISCONTINUED | OUTPATIENT
Start: 2024-11-19 | End: 2024-12-01 | Stop reason: HOSPADM

## 2024-11-18 RX ORDER — ONDANSETRON 2 MG/ML
4 INJECTION INTRAMUSCULAR; INTRAVENOUS EVERY 6 HOURS PRN
Status: DISCONTINUED | OUTPATIENT
Start: 2024-11-18 | End: 2024-12-01 | Stop reason: HOSPADM

## 2024-11-18 RX ORDER — LISINOPRIL 10 MG/1
5 TABLET ORAL DAILY
Status: DISCONTINUED | OUTPATIENT
Start: 2024-11-18 | End: 2024-12-01 | Stop reason: HOSPADM

## 2024-11-18 RX ORDER — BISACODYL 10 MG
10 SUPPOSITORY, RECTAL RECTAL DAILY PRN
Status: DISCONTINUED | OUTPATIENT
Start: 2024-11-18 | End: 2024-12-01 | Stop reason: HOSPADM

## 2024-11-18 RX ORDER — PREGABALIN 100 MG/1
200 CAPSULE ORAL NIGHTLY
COMMUNITY
End: 2024-12-10 | Stop reason: HOSPADM

## 2024-11-18 RX ORDER — FAMOTIDINE 20 MG/1
20 TABLET, FILM COATED ORAL DAILY
Status: DISCONTINUED | OUTPATIENT
Start: 2024-11-19 | End: 2024-11-20

## 2024-11-18 RX ORDER — SODIUM CHLORIDE 9 MG/ML
40 INJECTION, SOLUTION INTRAVENOUS AS NEEDED
Status: DISCONTINUED | OUTPATIENT
Start: 2024-11-18 | End: 2024-12-01 | Stop reason: HOSPADM

## 2024-11-18 RX ORDER — AMOXICILLIN 250 MG
1 CAPSULE ORAL NIGHTLY
Status: DISCONTINUED | OUTPATIENT
Start: 2024-11-18 | End: 2024-11-28

## 2024-11-18 RX ORDER — SODIUM BICARBONATE 650 MG/1
650 TABLET ORAL 3 TIMES DAILY
Status: DISCONTINUED | OUTPATIENT
Start: 2024-11-18 | End: 2024-11-19

## 2024-11-18 RX ORDER — BUSPIRONE HYDROCHLORIDE 10 MG/1
10 TABLET ORAL 3 TIMES DAILY PRN
Status: DISCONTINUED | OUTPATIENT
Start: 2024-11-18 | End: 2024-12-01 | Stop reason: HOSPADM

## 2024-11-18 RX ORDER — DEXTROSE MONOHYDRATE 25 G/50ML
25 INJECTION, SOLUTION INTRAVENOUS
Status: DISCONTINUED | OUTPATIENT
Start: 2024-11-18 | End: 2024-12-01 | Stop reason: HOSPADM

## 2024-11-18 RX ORDER — ASCORBIC ACID 500 MG
500 TABLET ORAL 2 TIMES DAILY
Status: DISCONTINUED | OUTPATIENT
Start: 2024-11-19 | End: 2024-12-01 | Stop reason: HOSPADM

## 2024-11-18 RX ADMIN — Medication 10 ML: at 02:20

## 2024-11-18 RX ADMIN — Medication 10 ML: at 21:44

## 2024-11-18 RX ADMIN — DONEPEZIL HYDROCHLORIDE 10 MG: 5 TABLET, FILM COATED ORAL at 02:19

## 2024-11-18 RX ADMIN — OXYCODONE HYDROCHLORIDE AND ACETAMINOPHEN 1 TABLET: 5; 325 TABLET ORAL at 02:19

## 2024-11-18 RX ADMIN — DULOXETINE HYDROCHLORIDE 60 MG: 30 CAPSULE, DELAYED RELEASE ORAL at 08:18

## 2024-11-18 RX ADMIN — ROSUVASTATIN 10 MG: 10 TABLET, FILM COATED ORAL at 08:18

## 2024-11-18 RX ADMIN — INSULIN GLARGINE 20 UNITS: 100 INJECTION, SOLUTION SUBCUTANEOUS at 02:32

## 2024-11-18 RX ADMIN — TAMSULOSIN HYDROCHLORIDE 0.4 MG: 0.4 CAPSULE ORAL at 08:18

## 2024-11-18 RX ADMIN — SERTRALINE HYDROCHLORIDE 25 MG: 50 TABLET ORAL at 08:18

## 2024-11-18 RX ADMIN — ERTAPENEM 1000 MG: 1 INJECTION INTRAMUSCULAR; INTRAVENOUS at 16:20

## 2024-11-18 RX ADMIN — PREGABALIN 100 MG: 100 CAPSULE ORAL at 02:19

## 2024-11-18 RX ADMIN — BUMETANIDE 2 MG: 1 TABLET ORAL at 08:18

## 2024-11-18 RX ADMIN — LISINOPRIL 5 MG: 10 TABLET ORAL at 08:18

## 2024-11-18 RX ADMIN — SODIUM BICARBONATE 650 MG: 650 TABLET ORAL at 16:19

## 2024-11-18 RX ADMIN — INSULIN LISPRO 2 UNITS: 100 INJECTION, SOLUTION INTRAVENOUS; SUBCUTANEOUS at 21:43

## 2024-11-18 RX ADMIN — POTASSIUM CHLORIDE 40 MEQ: 750 CAPSULE, EXTENDED RELEASE ORAL at 21:43

## 2024-11-18 RX ADMIN — OXYCODONE HYDROCHLORIDE AND ACETAMINOPHEN 1 TABLET: 5; 325 TABLET ORAL at 21:43

## 2024-11-18 RX ADMIN — BUMETANIDE 2 MG: 1 TABLET ORAL at 21:43

## 2024-11-18 RX ADMIN — ONDANSETRON 4 MG: 2 INJECTION INTRAMUSCULAR; INTRAVENOUS at 02:32

## 2024-11-18 RX ADMIN — PREGABALIN 75 MG: 75 CAPSULE ORAL at 21:43

## 2024-11-18 RX ADMIN — INSULIN GLARGINE 20 UNITS: 100 INJECTION, SOLUTION SUBCUTANEOUS at 21:43

## 2024-11-18 RX ADMIN — APIXABAN 5 MG: 5 TABLET, FILM COATED ORAL at 08:18

## 2024-11-18 RX ADMIN — FAMOTIDINE 20 MG: 20 TABLET, FILM COATED ORAL at 08:18

## 2024-11-18 RX ADMIN — OXYCODONE HYDROCHLORIDE AND ACETAMINOPHEN 1 TABLET: 5; 325 TABLET ORAL at 16:19

## 2024-11-18 RX ADMIN — SODIUM BICARBONATE 650 MG: 650 TABLET ORAL at 08:18

## 2024-11-18 RX ADMIN — SODIUM CHLORIDE 50 ML/HR: 900 INJECTION, SOLUTION INTRAVENOUS at 02:32

## 2024-11-18 RX ADMIN — APIXABAN 5 MG: 5 TABLET, FILM COATED ORAL at 21:43

## 2024-11-18 RX ADMIN — SODIUM CHLORIDE 100 ML/HR: 900 INJECTION, SOLUTION INTRAVENOUS at 21:44

## 2024-11-18 RX ADMIN — SODIUM BICARBONATE 650 MG: 650 TABLET ORAL at 21:43

## 2024-11-18 RX ADMIN — OXYCODONE HYDROCHLORIDE AND ACETAMINOPHEN 1 TABLET: 5; 325 TABLET ORAL at 08:18

## 2024-11-18 RX ADMIN — Medication 10 ML: at 08:21

## 2024-11-18 RX ADMIN — POTASSIUM CHLORIDE 40 MEQ: 750 CAPSULE, EXTENDED RELEASE ORAL at 16:19

## 2024-11-18 NOTE — CONSULTS
Psychiatric  INPATIENT WOUND & OSTOMY CONSULTATION    Today's Date: 11/18/24    Patient Name: Erick Luong  MRN: [id number]  [facility]: [id number]  PCP: Del Shetty MD  Referring Provider:   Consulting Provider (From admission, onward)      Start Ordered     Status Ordering Provider    11/18/24 0109 11/18/24 0109  Inpatient Wound Care MD Consult  Once        Specialty:  Wound Care  Provider:  Dunia Wolfe APRN Acknowledged POLIDORI, MARIANO ARIEL           Attending Provider: Telly Rodriguez MD  Length of Stay: 0    SUBJECTIVE   Chief Complaint: Foot wounds    HPI: Erick Luong, a 69 y.o.male, presents with a past medical history of coronary artery disease, type 2 diabetes, hypertension, hyperlipidemia, MI, coronary artery disease, osteomyelitis.  A full past medical history is listed below.  Inpatient wound care consulted due to wounds of left foot.  Patient receives care at [facility]. Patient reports they are treating wounds with Hydrofera Blue. Wounds are improving when compared to previous admission. Patient also has a wound of left arm which occurred with fall approximately 2 to 3 weeks ago. Patient was at SNF and is now home. He states he has been home for a couple months now and is going to wound care appointments.       Visit Dx:    ICD-10-CM ICD-9-CM   1. Acute UTI (urinary tract infection)  N39.0 599.0   2. Acute renal failure superimposed on chronic kidney disease, unspecified acute renal failure type, unspecified CKD stage  N17.9 584.9    N18.9 585.9   3. Acute hyperglycemia  R73.9 790.29   4. Impaired functional mobility and activity tolerance [Z74.09]  Z74.09 V49.89       Hospital Problem List:     Acute UTI (urinary tract infection)    Essential hypertension    Sleep apnea with use of continuous positive airway pressure (CPAP)    Diabetic ulcer of left foot associated with type 2 diabetes mellitus    CAD (coronary artery disease)    BPH without  obstruction/lower urinary tract symptoms    Stage 3b chronic kidney disease    Chronic heart failure with preserved ejection fraction (HFpEF)    Atrial fibrillation    Acute encephalopathy    Type 2 diabetes mellitus with hyperglycemia, with long-term current use of insulin      History:   Past Medical History:   Diagnosis Date    Arthritis     Autonomic disease     CAD (coronary artery disease) 02/06/2017    Cervical radiculopathy 09/16/2021    Chronic constipation with acute exaccerbation 05/10/2021    Coronary artery disease     Degeneration of cervical intervertebral disc 08/11/2021    Diabetes mellitus     Diabetic foot ulcer 08/31/2020    Diabetic polyneuropathy associated with type 2 diabetes mellitus 01/18/2021    Elevated cholesterol     Gastroesophageal reflux disease 05/13/2019    Headache     HTN (hypertension), benign 05/03/2017    Hyperlipidemia     Hypertension     Mixed hyperlipidemia 02/07/2017    Multiple lung nodules 01/26/2020    5mm, 9 mm RLL identified 1/2020, not present 10/2019.    Myocardial infarction     Osteomyelitis 01/22/2020    Osteomyelitis of fifth toe of right foot 10/07/2019    Pancreatitis     Persistent insomnia 01/20/2020    Renal disorder     Sleep apnea 02/06/2017    Sleep apnea with use of continuous positive airway pressure (CPAP)     NON-COMPLIANT    Slow transit constipation 01/16/2019    Spinal stenosis in cervical region 09/16/2021    Vitamin D deficiency 03/02/2021     Past Surgical History:   Procedure Laterality Date    ABDOMINAL SURGERY      AMPUTATION FOOT / TOE Left 10/2021    5th digit     ANTERIOR CERVICAL DISCECTOMY W/ FUSION N/A 08/05/2022    Procedure: CERVICAL DISCECTOMY ANTERIOR WITH FUSION C5-6 with possible plating of C5-7 with neuromonitoring and 1 c-arm;  Surgeon: Karel Soliz MD;  Location: [facility], [place];  Service: Neurosurgery;  Laterality: N/A;    APPENDECTOMY      BACK SURGERY      CARDIAC CATHETERIZATION Left 02/08/2021    Procedure: Left Heart  Cath w poss intervention left anatomical snuff box acess;  Surgeon: Omkar Charles DO;  Location: [facility] CATH INVASIVE LOCATION;  Service: Cardiology;  Laterality: Left;    CARDIAC SURGERY      CATARACT EXTRACTION      CERVICAL SPINE SURGERY      COLONOSCOPY N/A 01/31/2017    Normal exam repeat in 5 years    COLONOSCOPY N/A 02/11/2019    Mild acute inflammation    COLONOSCOPY N/A 04/07/2024    2 areas at 10 and 20 cm with friability ulceration 2 clips placed at 20 cm and 4 clips at 10 cm poor prep normal mucosa,mild eroisions and ulcerations in visible vessels    COLONOSCOPY N/A 7/1/2024    Procedure: COLONOSCOPY WITH ANESTHESIA;  Surgeon: Arsalan Lorenzo DO;  Location: [facility] ENDOSCOPY;  Service: Gastroenterology;  Laterality: N/A;  pre op constipation/diarrhea  post poor prep  pcp Del Shetty    COLONOSCOPY N/A 7/2/2024    Procedure: COLONOSCOPY WITH ANESTHESIA;  Surgeon: Agapito Christopher MD;  Location: [facility] ENDOSCOPY;  Service: Gastroenterology;  Laterality: N/A;  pre rectal bleeding  post poor prep  pcp Del Shetty MD    COLONOSCOPY W/ POLYPECTOMY  03/04/2014    Hyperplastic polyp    CORONARY ARTERY BYPASS GRAFT  10/2015    ENDOSCOPY  04/13/2011    Gastritis with hemorrhage    ENDOSCOPY N/A 05/05/2017    Normal exam    ENDOSCOPY N/A 02/11/2019    Gastritis    ENDOSCOPY N/A 09/01/2020    Non-erosive gastritis with hemorrhage    ENDOSCOPY N/A 02/10/2021    Esophagitis    ENDOSCOPY N/A 04/11/2024    There were esophageal mucosal changes suspicious for short-segment Low's esophagus present in the distal esophagus. The maximum longitudinal extent of these mucosal changes was 2 cm in length. Mucosa was biopsied with a cold forceps for histologyDistal esophagus, biopsies: Mild chronic active esophagogastritis. No evidence of intestinal metaplasia, dysplasia. Antrum, bx, Mild chronic gastritis    FOOT SURGERY Left     INCISION AND DRAINAGE OF WOUND Left 09/2007    spider bite     Social  "History     Socioeconomic History    Marital status:    Tobacco Use    Smoking status: Former     Current packs/day: 0.00     Types: Cigarettes     Quit date:      Years since quittin.9    Smokeless tobacco: Never    Tobacco comments:     smoked in highschool   Vaping Use    Vaping status: Never Used   Substance and Sexual Activity    Alcohol use: No    Drug use: No    Sexual activity: Defer     Family History   Problem Relation Age of Onset    Colon cancer Father     Heart disease Father     Colon cancer Sister     Colon polyps Sister     Alzheimer's disease Mother     Coronary artery disease Sister     Coronary artery disease Sister        Allergies:  Allergies   Allergen Reactions    Cefepime Hives and Anaphylaxis    Bactrim [Sulfamethoxazole-Trimethoprim] Other (See Comments)     \"RENAL FAILURE\"    Vancomycin Itching    Zolpidem Mental Status Change     \"makes him crazy\"    Metronidazole Rash       Medications:    Current Facility-Administered Medications:     acetaminophen (TYLENOL) tablet 650 mg, 650 mg, Oral, Q4H PRN **OR** acetaminophen (TYLENOL) 160 MG/5ML oral solution 650 mg, 650 mg, Oral, Q4H PRN **OR** acetaminophen (TYLENOL) suppository 650 mg, 650 mg, Rectal, Q4H PRN, Garrett Alicia MD    apixaban (ELIQUIS) tablet 5 mg, 5 mg, Oral, BID, Garrett Alicia MD, 5 mg at 24 0818    sennosides-docusate (PERICOLACE) 8.6-50 MG per tablet 2 tablet, 2 tablet, Oral, BID PRN **AND** polyethylene glycol (MIRALAX) packet 17 g, 17 g, Oral, Daily PRN **AND** bisacodyl (DULCOLAX) EC tablet 5 mg, 5 mg, Oral, Daily PRN **AND** bisacodyl (DULCOLAX) suppository 10 mg, 10 mg, Rectal, Daily PRN, Garrett Alicia MD    bumetanide (BUMEX) tablet 2 mg, 2 mg, Oral, BID, Garrett Alicia MD, 2 mg at 24 0818    busPIRone (BUSPAR) tablet 10 mg, 10 mg, Oral, TID PRN, Garrett Alicia MD    dextrose (D50W) (25 g/50 mL) IV injection 25 g, 25 g, Intravenous, Q15 Min " PRN, Garrett Alicia MD    dextrose (GLUTOSE) oral gel 15 g, 15 g, Oral, Q15 Min PRN, Garrett Alicia MD    [Held by provider] donepezil (ARICEPT) tablet 10 mg, 10 mg, Oral, Nightly, Garrett Alicia MD, 10 mg at 11/18/24 0219    [START ON 11/19/2024] DULoxetine (CYMBALTA) DR capsule 30 mg, 30 mg, Oral, Daily, Telly Rodriguez MD    [START ON 11/19/2024] famotidine (PEPCID) tablet 20 mg, 20 mg, Oral, Daily, Telly Rodriguez MD    glucagon (GLUCAGEN) injection 1 mg, 1 mg, Intramuscular, Q15 Min PRN, Garrett Alicia MD    insulin glargine (LANTUS, SEMGLEE) injection 20 Units, 20 Units, Subcutaneous, Nightly, Garrett Alicia MD, 20 Units at 11/18/24 0232    Insulin Lispro (humaLOG) injection 10 Units, 10 Units, Subcutaneous, TID With Meals, Garrett Alicia MD    Insulin Lispro (humaLOG) injection 2-9 Units, 2-9 Units, Subcutaneous, 4x Daily AC & at Bedtime, Garrett Alicia MD    [START ON 11/19/2024] levoFLOXacin (LEVAQUIN) 750 mg/150 mL D5W (premix) (LEVAQUIN) 750 mg, 750 mg, Intravenous, Q48H, Garrett Alicia MD    lisinopril (PRINIVIL,ZESTRIL) tablet 5 mg, 5 mg, Oral, Daily, Garrett Alicia MD, 5 mg at 11/18/24 0818    nitroglycerin (NITROSTAT) SL tablet 0.4 mg, 0.4 mg, Sublingual, Q5 Min PRN, Garrett Alicia MD    ondansetron (ZOFRAN) injection 4 mg, 4 mg, Intravenous, Q6H PRN, Garrett Alicia MD, 4 mg at 11/18/24 0232    pregabalin (LYRICA) capsule 75 mg, 75 mg, Oral, Nightly, Telly Rodriguez MD    rosuvastatin (CRESTOR) tablet 10 mg, 10 mg, Oral, Daily, Garrett Alicia MD, 10 mg at 11/18/24 0818    sennosides-docusate (PERICOLACE) 8.6-50 MG per tablet 1 tablet, 1 tablet, Oral, Nightly, Garrett Alicia MD    sodium bicarbonate tablet 650 mg, 650 mg, Oral, TID, Garrett Alicia MD, 650 mg at 11/18/24 0818    sodium chloride 0.9 % flush 10 mL, 10 mL, Intravenous, Q12H, Garrett Alicia,  MD, 10 mL at 11/18/24 0821    sodium chloride 0.9 % flush 10 mL, 10 mL, Intravenous, PRN, Garrett Alicia MD    sodium chloride 0.9 % infusion 40 mL, 40 mL, Intravenous, PRN, Garrett Alicia MD    tamsulosin (FLOMAX) 24 hr capsule 0.4 mg, 0.4 mg, Oral, Daily, Garrett Alicia MD, 0.4 mg at 11/18/24 0818    OBJECTIVE     Vitals:    11/18/24 1123   BP: 128/61   Pulse: 81   Resp: 16   Temp: 98.1 °F (36.7 °C)   SpO2: 95%       PHYSICAL EXAM:   Physical Exam  Vitals and nursing note reviewed.   Constitutional:       General: He is sleeping.      Appearance: He is obese.      Comments: Body mass index is 35.15 kg/m².    HENT:      Head: Normocephalic and atraumatic.   Eyes:      General: Lids are normal. Gaze aligned appropriately.   Cardiovascular:      Rate and Rhythm: Normal rate and regular rhythm.   Pulmonary:      Effort: Pulmonary effort is normal. No respiratory distress.   Abdominal:      General: Abdomen is protuberant.      Palpations: Abdomen is soft.   Musculoskeletal:      Cervical back: Normal range of motion and neck supple.   Feet:      Left foot:      Skin integrity: Ulcer present.      Comments: Open wounds of left plantar midfoot and left plantar heel.  Wound of left plantar midfoot measures 2 cm x 1.2 cm x 0.2 cm.  Left plantar heel measures 2.4 cm x 2.9 cm x 0.2 cm.  Slough and subcutaneous tissue present wound bed.  Edges are open and attached wound bed.  No undermining or tunneling noted.  Small scab of the left dorsal foot.  Removed part of scab with healed skin under.  There is still remains a small portion of scab.  Skin:     General: Skin is warm and dry.      Findings: Wound present.      Comments: Left forearm wound with slough and subcutaneous tissue present wound bed.  Area is dry.  No significant erythema periwound area.  No signs of infection.   Neurological:      Mental Status: He is oriented to person, place, and time and easily aroused.      Motor: Weakness  present.   Psychiatric:         Attention and Perception: Attention normal.         Mood and Affect: Affect is flat.         Behavior: Behavior is cooperative.        Results Review:  Lab Results (last 48 hours)       Procedure Component Value Units Date/Time    POC Glucose Once [[id number]]  (Normal) Collected: 11/18/24 1121    Specimen: Blood Updated: 11/18/24 1132     Glucose 81 mg/dL      Comment: : [id number] Nadeem DonnaMeter ID: [id number]       POC Glucose Once [[id number]]  (Normal) Collected: 11/18/24 0813    Specimen: Blood Updated: 11/18/24 0824     Glucose 76 mg/dL      Comment: : [id number] Nadeem DonnaMeter ID: [id number]       Basic Metabolic Panel [[id number]]  (Abnormal) Collected: 11/18/24 0416    Specimen: Blood Updated: 11/18/24 0549     Glucose 106 mg/dL      BUN 49 mg/dL      Creatinine 2.63 mg/dL      Sodium 140 mmol/L      Potassium 3.5 mmol/L      Chloride 108 mmol/L      CO2 16.0 mmol/L      Calcium 8.6 mg/dL      BUN/Creatinine Ratio 18.6     Anion Gap 16.0 mmol/L      eGFR 25.7 mL/min/1.73     Narrative:      GFR Normal >60  Chronic Kidney Disease <60  Kidney Failure <15      CBC & Differential [[id number]]  (Abnormal) Collected: 11/18/24 0416    Specimen: Blood Updated: 11/18/24 0527    Narrative:      The following orders were created for panel order CBC & Differential.  Procedure                               Abnormality         Status                     ---------                               -----------         ------                     CBC Auto Differential[[id number]]        Abnormal            Final result                 Please view results for these tests on the individual orders.    CBC Auto Differential [[id number]]  (Abnormal) Collected: 11/18/24 0416    Specimen: Blood Updated: 11/18/24 0527     WBC 9.37 10*3/mm3      RBC 3.44 10*6/mm3      Hemoglobin 9.7 g/dL      Hematocrit 29.3 %      MCV 85.2 fL      MCH 28.2 pg      MCHC 33.1 g/dL      RDW 15.6 %      RDW-SD 47.8 fl       MPV 12.3 fL      Platelets 242 10*3/mm3      Neutrophil % 67.8 %      Lymphocyte % 17.6 %      Monocyte % 10.5 %      Eosinophil % 3.0 %      Basophil % 0.6 %      Immature Grans % 0.5 %      Neutrophils, Absolute 6.35 10*3/mm3      Lymphocytes, Absolute 1.65 10*3/mm3      Monocytes, Absolute 0.98 10*3/mm3      Eosinophils, Absolute 0.28 10*3/mm3      Basophils, Absolute 0.06 10*3/mm3      Immature Grans, Absolute 0.05 10*3/mm3      nRBC 0.0 /100 WBC     POC Glucose Once [[id number]]  (Normal) Collected: 11/18/24 0223    Specimen: Blood Updated: 11/18/24 0234     Glucose 122 mg/dL      Comment: : [id number] Wade ObregonMeter ID: [id number]       Blood Culture - Blood, Arm, Right [[id number]] Collected: 11/18/24 0106    Specimen: Blood from Arm, Right Updated: 11/18/24 0131    Lactic Acid, Plasma [[id number]]  (Normal) Collected: 11/17/24 2335    Specimen: Blood Updated: 11/18/24 0001     Lactate 1.3 mmol/L     Blood Culture - Blood, Hand, Right [[id number]] Collected: 11/17/24 2335    Specimen: Blood from Hand, Right Updated: 11/17/24 2342    Respiratory Panel PCR w/COVID-19(SARS-CoV-2) MICHAEL/ANKUSH/SEAN/PAD/COR/ROSE MARY In-House, NP Swab in UTM/VTM, 2 HR TAT - Swab, Nasopharynx [[id number]]  (Normal) Collected: 11/17/24 2155    Specimen: Swab from Nasopharynx Updated: 11/17/24 2254     ADENOVIRUS, PCR Not Detected     Coronavirus 229E Not Detected     Coronavirus HKU1 Not Detected     Coronavirus NL63 Not Detected     Coronavirus OC43 Not Detected     COVID19 Not Detected     Human Metapneumovirus Not Detected     Human Rhinovirus/Enterovirus Not Detected     Influenza A PCR Not Detected     Influenza B PCR Not Detected     Parainfluenza Virus 1 Not Detected     Parainfluenza Virus 2 Not Detected     Parainfluenza Virus 3 Not Detected     Parainfluenza Virus 4 Not Detected     RSV, PCR Not Detected     Bordetella pertussis pcr Not Detected     Bordetella parapertussis PCR Not Detected     Chlamydophila pneumoniae PCR Not  Detected     Mycoplasma pneumo by PCR Not Detected    Narrative:      In the setting of a positive respiratory panel with a viral infection PLUS a negative procalcitonin without other underlying concern for bacterial infection, consider observing off antibiotics or discontinuation of antibiotics and continue supportive care. If the respiratory panel is positive for atypical bacterial infection (Bordetella pertussis, Chlamydophila pneumoniae, or Mycoplasma pneumoniae), consider antibiotic de-escalation to target atypical bacterial infection.    Urine Culture - Urine, Urine, Clean Catch [[id number]] Collected: 11/17/24 2213    Specimen: Urine, Clean Catch Updated: 11/17/24 2242    Urinalysis With Microscopic If Indicated (No Culture) - Urine, Clean Catch [[id number]]  (Abnormal) Collected: 11/17/24 2213    Specimen: Urine, Clean Catch Updated: 11/17/24 2224     Color, UA Yellow     Appearance, UA Cloudy     pH, UA 5.5     Specific Gravity, UA 1.019     Glucose, UA >=1000 mg/dL (3+)     Ketones, UA Negative     Bilirubin, UA Negative     Blood, UA Trace     Protein,  mg/dL (2+)     Leuk Esterase, UA Moderate (2+)     Nitrite, UA Positive     Urobilinogen, UA 1.0 E.U./dL    Urinalysis, Microscopic Only - Urine, Clean Catch [[id number]]  (Abnormal) Collected: 11/17/24 2213    Specimen: Urine, Clean Catch Updated: 11/17/24 2224     RBC, UA 0-2 /HPF      WBC, UA Too Numerous to Count /HPF      Bacteria, UA 4+ /HPF      Squamous Epithelial Cells, UA 3-6 /HPF      Hyaline Casts, UA None Seen /LPF      Methodology Automated Microscopy    Comprehensive Metabolic Panel [[id number]]  (Abnormal) Collected: 11/17/24 2153    Specimen: Blood from Hand, Left Updated: 11/17/24 2222     Glucose 348 mg/dL      BUN 53 mg/dL      Creatinine 2.83 mg/dL      Sodium 138 mmol/L      Potassium 4.4 mmol/L      Chloride 106 mmol/L      CO2 19.0 mmol/L      Calcium 8.6 mg/dL      Total Protein 6.3 g/dL      Albumin 3.5 g/dL      ALT (SGPT) 19  U/L      AST (SGOT) 13 U/L      Alkaline Phosphatase 134 U/L      Total Bilirubin 0.3 mg/dL      Globulin 2.8 gm/dL      A/G Ratio 1.3 g/dL      BUN/Creatinine Ratio 18.7     Anion Gap 13.0 mmol/L      eGFR 23.5 mL/min/1.73     Narrative:      GFR Normal >60  Chronic Kidney Disease <60  Kidney Failure <15      Magnesium [[id number]]  (Normal) Collected: 11/17/24 2153    Specimen: Blood from Hand, Left Updated: 11/17/24 2222     Magnesium 2.0 mg/dL     Lipase [[id number]]  (Abnormal) Collected: 11/17/24 2153    Specimen: Blood from Hand, Left Updated: 11/17/24 2217     Lipase 11 U/L     CBC & Differential [[id number]]  (Abnormal) Collected: 11/17/24 2153    Specimen: Blood from Hand, Left Updated: 11/17/24 2205    Narrative:      The following orders were created for panel order CBC & Differential.  Procedure                               Abnormality         Status                     ---------                               -----------         ------                     CBC Auto Differential[[id number]]        Abnormal            Final result                 Please view results for these tests on the individual orders.    CBC Auto Differential [[id number]]  (Abnormal) Collected: 11/17/24 2153    Specimen: Blood from Hand, Left Updated: 11/17/24 2205     WBC 10.64 10*3/mm3      RBC 3.58 10*6/mm3      Hemoglobin 10.2 g/dL      Hematocrit 30.5 %      MCV 85.2 fL      MCH 28.5 pg      MCHC 33.4 g/dL      RDW 15.8 %      RDW-SD 49.5 fl      MPV 11.7 fL      Platelets 255 10*3/mm3      Neutrophil % 75.2 %      Lymphocyte % 12.6 %      Monocyte % 8.6 %      Eosinophil % 2.7 %      Basophil % 0.5 %      Immature Grans % 0.4 %      Neutrophils, Absolute 8.01 10*3/mm3      Lymphocytes, Absolute 1.34 10*3/mm3      Monocytes, Absolute 0.91 10*3/mm3      Eosinophils, Absolute 0.29 10*3/mm3      Basophils, Absolute 0.05 10*3/mm3      Immature Grans, Absolute 0.04 10*3/mm3      nRBC 0.0 /100 WBC     POC Occult Blood Stool [[id number]]   (Normal) Resulted: 11/17/24 2121    Specimen: Stool Updated: 11/17/24 2121     Fecal Occult Blood Negative     Lot Number [id number]     Expiration Date 4/30/2025     DEVELOPER LOT NUMBER 275     DEVELOPER EXPIRATION DATE 4/30/2025     Positive Control Positive     Negative Control Negative          Imaging Results (Last 72 Hours)       Procedure Component Value Units Date/Time    XR Chest 1 View [[id number]] Collected: 11/17/24 2138     Updated: 11/17/24 2143    Narrative:      XR CHEST 1 VW- 11/17/2024 8:33 PM     HISTORY: cough       COMPARISON: 10/20/2024     FINDINGS:  Upright frontal radiograph of the chest was obtained     No lung consolidation. No pleural effusion or pneumothorax. Prior  coronary bypass. Heart size is stable. Pulmonary vasculature are  nondilated. The osseous structures and surrounding soft tissues  demonstrate no acute abnormality.       Impression:      1.  Stable chest exam without acute process. No visualized acute  infiltrate.  2.  Previous coronary bypass.     This report was signed and finalized on 11/17/2024 9:39 PM by Dr King Ceballos.                  ASSESSMENT/PLAN       Examination and evaluation of wound(s) was performed.    DIAGNOSIS:   Non-pressure chronic ulcer of left heel with fat layer exposed  Non-pressure chronic ulcer of other part of left foot with fat layer exposed  Type 2 diabetes mellitus with foot ulcer  Open wound of left forearm  Obesity - Body mass index is 35.15 kg/m².     HOSPITAL PROBLEM LIST:    Acute UTI (urinary tract infection)    Essential hypertension    Sleep apnea with use of continuous positive airway pressure (CPAP)    Diabetic ulcer of left foot associated with type 2 diabetes mellitus    CAD (coronary artery disease)    BPH without obstruction/lower urinary tract symptoms    Stage 3b chronic kidney disease    Chronic heart failure with preserved ejection fraction (HFpEF)    Atrial fibrillation    Acute encephalopathy    Type 2 diabetes mellitus with  hyperglycemia, with long-term current use of insulin       PLAN:   Orders placed for wound care and pressure/moisture management as listed below.       Start     Ordered    11/18/24 1452  Wound Care  Daily      Question Answer Comment   Wound Locations Left plantar foot x2    Wound Care Instructions Clean with NS. Apply Opticell Ag to open wounds. Cover with gauze and wrap with Kerlix. Change daily.    Cleanse Normal Saline    Intervention Other    Other Opticell Ag    Dressing: Gauze Roll        11/18/24 1451    11/18/24 1452  Wound Care Abrasion  Daily      Question Answer Comment   Wound Locations Left arm    Wound Care Instructions Clean with NS.  Apply therahoney to wound bed. Cover with Vaseline gauze and wrap with Kerlix. Change daily.    Cleanse Normal Saline    Intervention Thera Honey    Intervention Vaseline Gauze    Dressing: Gauze Roll        11/18/24 1451    11/18/24 1452  Apply Waffle mattress overlay Until Discontinued  Until Discontinued        Question:  Supply to be applied:  Answer:  Waffle mattress overlay    11/18/24 1451    11/18/24 1449  Turn Patient  Now Then Every 2 Hours         11/18/24 1451    11/18/24 1449  Head of Bed 30 Degrees or Less (Unless Contraindicated)  Until Discontinued         11/18/24 1451    11/18/24 1449  Elevate Heels Off of Bed  Until Discontinued         11/18/24 1451    11/18/24 1449  Use Seat Cushion When Up In Chair  Continuous         11/18/24 1451    11/18/24 1449  Silicone Border Dressing to Bony Prominences  Every Shift       11/18/24 1451    11/18/24 1449  Do NOT Rub or Massage Any Bony Prominence  Continuous         11/18/24 1451    Unscheduled  Wound Care  As Needed      Question:  Wound Care Instructions  Answer:  Apply Moisture Barrier After Any Incontinence    11/18/24 1451           Discussed findings and treatment plan including risks, benefits, and treatment options with patient in detail. Patient agreed with treatment plan.      This document has been  electronically signed by SUSAN Saab on 11/18/2024 13:36 CST

## 2024-11-18 NOTE — PLAN OF CARE
Goal Outcome Evaluation:  Plan of Care Reviewed With: patient        Progress: no change  Outcome Evaluation: Initial nutrition assessment secondary to non-healing wounds. Pt presented to ED with AMS. He is admitted with acute UTI. Pt has 2x chronic, nonpressure ulcers of the left foot with fat layer exposed, L forearm with open wound, and L medial food with DM ulcer. He is ordered a healthy heart, C.CHO diet. He consumed 0% of breakfast and lunch today. He has complained of intermittent nausea. He is weak with FTT. He had a recent rib fx and has chronic pain. He has frequent hospital admissions. He reports he does not like oral nutrition supplements. He has been evaluated by the wound care nurse practitioner. Recommend to start MVI w/minerals daily, Vit C BID, and 10 days of zinc supplementation to correct any deficiencies and promote wound healing. He has been advised of alternate food selections as needed. Will follow.

## 2024-11-18 NOTE — PLAN OF CARE
Goal Outcome Evaluation:  Plan of Care Reviewed With: patient        Progress: no change  Outcome Evaluation: PT eval completed.  Pt pleasant and agreeable to therapy.  Pt reports N/T in B feet.  Open wounds L foot.  Reports he uses a boot at home and a rwx.  Pt encouraged to have spouse bring boot into hospital for use w/ out of bed activity.  Pt w/ generalized weakness and c/os abdominal pain rated 7/10.  Pt performed bed mobility supine to sit w/ SBA.  Sit to/from standing w/ Min assist.  Attempted stand pivot tfer bed to chair, however unable to safely stay balanced to complete.  Pt was educated for NWB to protect L foot to assist w/ healing of wounds.  Pt returned to supine and scooted up in bed w/ SBA.  Pt will benefit from continued PT services to improve activity tolerance, balance, safety/falls prevention, skin protection, to improve strength and progress functional mobility w/ less need for assist.  Discharge plans pending pt progress.    Anticipated Discharge Disposition (PT): other (see comments) (pending progress)

## 2024-11-18 NOTE — THERAPY EVALUATION
Patient Name: Erick Luong  : 1955    MRN: [id number]                              Today's Date: 2024       Admit Date: 2024    Visit Dx:     ICD-10-CM ICD-9-CM   1. Acute UTI (urinary tract infection)  N39.0 599.0   2. Acute renal failure superimposed on chronic kidney disease, unspecified acute renal failure type, unspecified CKD stage  N17.9 584.9    N18.9 585.9   3. Acute hyperglycemia  R73.9 790.29   4. Impaired functional mobility and activity tolerance [Z74.09]  Z74.09 V49.89     Patient Active Problem List   Diagnosis    Obesity, unspecified obesity severity, unspecified obesity type    Essential hypertension    Type 2 diabetes mellitus with hyperglycemia, with long-term current use of insulin    Nonsmoker    Anemia due to chronic kidney disease    Class 3 severe obesity due to excess calories with body mass index (BMI) of 40.0 to 44.9 in adult    Anasarca    Sleep apnea with use of continuous positive airway pressure (CPAP)    Medically noncompliant    Diabetic ulcer of left foot associated with type 2 diabetes mellitus    Diabetic polyneuropathy associated with type 2 diabetes mellitus    Spinal stenosis in cervical region    Degeneration of cervical intervertebral disc    Cervical radiculopathy    Degeneration of lumbar or lumbosacral intervertebral disc    Cervical myelopathy    Bilateral carpal tunnel syndrome    CAD (coronary artery disease)    GERD without esophagitis    BPH without obstruction/lower urinary tract symptoms    Stage 3b chronic kidney disease    Chronic diastolic heart failure    Type 2 myocardial infarction due to heart failure    Left carpal tunnel syndrome    Syncope and collapse, recurrent episodes    Poorly-controlled hypertension    Rhinovirus    Peripheral vascular disease    Chronic kidney disease (CKD), stage IV (severe)    Diabetic foot infection    Sepsis    Epigastric pain    Chronic heart failure with preserved ejection fraction (HFpEF)    Sepsis due to  methicillin resistant Staphylococcus aureus (MRSA) with encephalopathy without septic shock    Slow transit constipation    CHF exacerbation    CHF (congestive heart failure), NYHA class I, acute on chronic, combined    Rectal bleeding    Acute on chronic heart failure with preserved ejection fraction (HFpEF)    DKA, type 2, not at goal    Atrial fibrillation    E. coli UTI, ESBL    Acute UTI (urinary tract infection)    Acute encephalopathy    Type 2 diabetes mellitus with hyperglycemia, with long-term current use of insulin     Past Medical History:   Diagnosis Date    Arthritis     Autonomic disease     CAD (coronary artery disease) 02/06/2017    Cervical radiculopathy 09/16/2021    Chronic constipation with acute exaccerbation 05/10/2021    Coronary artery disease     Degeneration of cervical intervertebral disc 08/11/2021    Diabetes mellitus     Diabetic foot ulcer 08/31/2020    Diabetic polyneuropathy associated with type 2 diabetes mellitus 01/18/2021    Elevated cholesterol     Gastroesophageal reflux disease 05/13/2019    Headache     HTN (hypertension), benign 05/03/2017    Hyperlipidemia     Hypertension     Mixed hyperlipidemia 02/07/2017    Multiple lung nodules 01/26/2020    5mm, 9 mm RLL identified 1/2020, not present 10/2019.    Myocardial infarction     Osteomyelitis 01/22/2020    Osteomyelitis of fifth toe of right foot 10/07/2019    Pancreatitis     Persistent insomnia 01/20/2020    Renal disorder     Sleep apnea 02/06/2017    Sleep apnea with use of continuous positive airway pressure (CPAP)     NON-COMPLIANT    Slow transit constipation 01/16/2019    Spinal stenosis in cervical region 09/16/2021    Vitamin D deficiency 03/02/2021     Past Surgical History:   Procedure Laterality Date    ABDOMINAL SURGERY      AMPUTATION FOOT / TOE Left 10/2021    5th digit     ANTERIOR CERVICAL DISCECTOMY W/ FUSION N/A 08/05/2022    Procedure: CERVICAL DISCECTOMY ANTERIOR WITH FUSION C5-6 with possible plating  of C5-7 with neuromonitoring and 1 c-arm;  Surgeon: Karel Soliz MD;  Location: [facility] OR;  Service: Neurosurgery;  Laterality: N/A;    APPENDECTOMY      BACK SURGERY      CARDIAC CATHETERIZATION Left 02/08/2021    Procedure: Left Heart Cath w poss intervention left anatomical snuff box acess;  Surgeon: Omkar Charles DO;  Location: [facility] CATH INVASIVE LOCATION;  Service: Cardiology;  Laterality: Left;    CARDIAC SURGERY      CATARACT EXTRACTION      CERVICAL SPINE SURGERY      COLONOSCOPY N/A 01/31/2017    Normal exam repeat in 5 years    COLONOSCOPY N/A 02/11/2019    Mild acute inflammation    COLONOSCOPY N/A 04/07/2024    2 areas at 10 and 20 cm with friability ulceration 2 clips placed at 20 cm and 4 clips at 10 cm poor prep normal mucosa,mild eroisions and ulcerations in visible vessels    COLONOSCOPY N/A 7/1/2024    Procedure: COLONOSCOPY WITH ANESTHESIA;  Surgeon: Arsalan Lorenzo DO;  Location: [facility] ENDOSCOPY;  Service: Gastroenterology;  Laterality: N/A;  pre op constipation/diarrhea  post poor prep  pcp Del Shetty    COLONOSCOPY N/A 7/2/2024    Procedure: COLONOSCOPY WITH ANESTHESIA;  Surgeon: Agapito Christopher MD;  Location: [facility] ENDOSCOPY;  Service: Gastroenterology;  Laterality: N/A;  pre rectal bleeding  post poor prep  pcp Del Shetty MD    COLONOSCOPY W/ POLYPECTOMY  03/04/2014    Hyperplastic polyp    CORONARY ARTERY BYPASS GRAFT  10/2015    ENDOSCOPY  04/13/2011    Gastritis with hemorrhage    ENDOSCOPY N/A 05/05/2017    Normal exam    ENDOSCOPY N/A 02/11/2019    Gastritis    ENDOSCOPY N/A 09/01/2020    Non-erosive gastritis with hemorrhage    ENDOSCOPY N/A 02/10/2021    Esophagitis    ENDOSCOPY N/A 04/11/2024    There were esophageal mucosal changes suspicious for short-segment Low's esophagus present in the distal esophagus. The maximum longitudinal extent of these mucosal changes was 2 cm in length. Mucosa was biopsied with a cold forceps for histologyDistal  esophagus, biopsies: Mild chronic active esophagogastritis. No evidence of intestinal metaplasia, dysplasia. Antrum, bx, Mild chronic gastritis    FOOT SURGERY Left     INCISION AND DRAINAGE OF WOUND Left 09/2007    spider bite      General Information       Row Name 11/18/24 1059          Physical Therapy Time and Intention    Document Type evaluation;other (see comments)  see MAR  -JE     Mode of Treatment physical therapy  -       Row Name 11/18/24 1059          General Information    Patient Profile Reviewed yes  -JE     Prior Level of Function --   pt states he still drives, reports he helps w/ shopping and cooking, but not house cleaning; reports I w/ dressing in bathing, but does not have a shower chair; uses rwx w/ walking; claims he is able to enter/exit home on 1 steps w/out HR  -JE     Existing Precautions/Restrictions fall;other (see comments)   open wound R foot; reports he has a boot to wear when out of bed; advised pt to have spouse bring in  -JE     Barriers to Rehab medically complex;previous functional deficit;physical barrier;impaired sensation  -       Row Name 11/18/24 1059          Living Environment    People in Home spouse  -     Name(s) of People in Home lives w/ spouse - Joan  -       Row Name 11/18/24 1059          Home Main Entrance    Number of Stairs, Main Entrance one  -JE     Stair Railings, Main Entrance none  -       Row Name 11/18/24 1059          Stairs Within Home, Primary    Number of Stairs, Within Home, Primary none  -       Row Name 11/18/24 1059          Cognition    Orientation Status (Cognition) oriented x 4  -       Row Name 11/18/24 1059          Safety Issues/Impairments Affecting Functional Mobility    Safety Issues Affecting Function (Mobility) safety precaution awareness  -     Impairments Affecting Function (Mobility) balance;endurance/activity tolerance;pain;strength;sensation/sensory awareness  -               User Key  (r) = Recorded By, (t)  = Taken By, (c) = Cosigned By      Initials Name Provider Type    Melly Brown, PT Physical Therapist                   Mobility       Row Name 11/18/24 1059          Bed Mobility    Bed Mobility scooting/bridging;supine-sit;sit-supine  -     Scooting/Bridging Dare (Bed Mobility) standby assist;verbal cues  -NIKO     Supine-Sit Dare (Bed Mobility) standby assist;verbal cues  -NIKO     Sit-Supine Dare (Bed Mobility) standby assist;verbal cues  -NIKO     Assistive Device (Bed Mobility) bed rails  -     Comment, (Bed Mobility) increase time and effort  -       Row Name 11/18/24 1059          Sit-Stand Transfer    Sit-Stand Dare (Transfers) minimum assist (75% patient effort);verbal cues  -NIKO     Comment, (Sit-Stand Transfer) attempted sit to/from stand and stand to/from chair tfer, unable to safely pivot to chair; sit to/from stand w/ min assist  -       Row Name 11/18/24 1059          Gait/Stairs (Locomotion)    Comment, (Gait/Stairs) unable to safely attempt at this time due to open wounds L foot w/ decrease balance; needs rwx and boot for the L LE  -       Row Name 11/18/24 1059          Mobility    Extremity Weight-bearing Status left lower extremity  -     Left Lower Extremity (Weight-bearing Status) other (see comments)  has a boot, but boot at home; advised for NWB at this time by this therapist due to open wounds L foot  -               User Key  (r) = Recorded By, (t) = Taken By, (c) = Cosigned By      Initials Name Provider Type    Melly Brown, PT Physical Therapist                   Obj/Interventions       Row Name 11/18/24 1059          Range of Motion Comprehensive    Comment, General Range of Motion AROM all 4 extremities WFLs except B shlds limited to < 50%, L ankle DF decreased compared to R  -       Row Name 11/18/24 1059          Strength Comprehensive (MMT)    Comment, General Manual Muscle Testing (MMT) Assessment grossly 4 to 4+/5 throughout  except L ankle 4_ to 3+/5  -       Row Name 11/18/24 1059          Balance    Balance Assessment sitting static balance;sitting dynamic balance;standing static balance  -     Static Sitting Balance standby assist  -     Dynamic Sitting Balance standby assist  -     Position, Sitting Balance supported;unsupported;sitting edge of bed  -     Static Standing Balance minimal assist;verbal cues  attempting to maintain LLE NWB - reports he has a boot he wears at home and uses a rwx  -     Position/Device Used, Standing Balance supported  -       Row Name 11/18/24 1059          Sensory Assessment (Somatosensory)    Sensory Assessment (Somatosensory) other (see comments)  reports numbness/tingling in feet  -               User Key  (r) = Recorded By, (t) = Taken By, (c) = Cosigned By      Initials Name Provider Type    Melly Brown, PT Physical Therapist                   Goals/Plan       Row Name 11/18/24 1059          Bed Mobility Goal 1 (PT)    Activity/Assistive Device (Bed Mobility Goal 1, PT) bed mobility activities, all  -     [place] Level/Cues Needed (Bed Mobility Goal 1, PT) independent  -     Time Frame (Bed Mobility Goal 1, PT) 10 days  -     Progress/Outcomes (Bed Mobility Goal 1, PT) goal ongoing  -       Row Name 11/18/24 1059          Transfer Goal 1 (PT)    Activity/Assistive Device (Transfer Goal 1, PT) sit-to-stand/stand-to-sit;bed-to-chair/chair-to-bed;other (see comments)  rwx w/ boot for L LE, NWB L LE  -JE     [place] Level/Cues Needed (Transfer Goal 1, PT) contact guard required  -     Time Frame (Transfer Goal 1, PT) long term goal (LTG);10 days  -     Progress/Outcome (Transfer Goal 1, PT) goal ongoing  -       Row Name 11/18/24 1059          Gait Training Goal 1 (PT)    Activity/Assistive Device (Gait Training Goal 1, PT) gait (walking locomotion);assistive device use;decrease fall risk;improve balance and speed;increase endurance/gait  distance;increase energy conservation;maintain weight-bearing status;walker, rolling;other (see comments)  w/ boot L LE  -JE     [place] Level (Gait Training Goal 1, PT) contact guard required  -JE     Distance (Gait Training Goal 1, PT) 10-15 ft  -JE     Time Frame (Gait Training Goal 1, PT) long term goal (LTG);10 days  -JE     Progress/Outcome (Gait Training Goal 1, PT) goal ongoing  -       Row Name 11/18/24 1059          Balance Goal 1 (PT)    Activity/Assistive Device (Balance Goal) sit to stand dynamic balance;standing static balance;standing dynamic balance;walker, rolling  -JE     [place] Level/Cues Needed (Balance Goal 1, PT) contact guard required  -JE     Time Frame (Balance Goal 1, PT) long-term goal (LTG);other (see comments)  10 days  -JE     Progress/Outcomes (Balance Goal 1, PT) goal ongoing  -       Row Name 11/18/24 1059          Therapy Assessment/Plan (PT)    Planned Therapy Interventions (PT) balance training;bed mobility training;gait training;home exercise program;patient/family education;postural re-education;strengthening;transfer training;other (see comments)  safety/falls prevention, skin protection/pressure relief.  -               User Key  (r) = Recorded By, (t) = Taken By, (c) = Cosigned By      Initials Name Provider Type    Melly Brown, PT Physical Therapist                   Clinical Impression       Row Name 11/18/24 1059          Pain    Pretreatment Pain Rating 7/10  -     Posttreatment Pain Rating 7/10  -     Pain Location abdomen  -     Pain Management Interventions exercise or physical activity utilized  -     Response to Pain Interventions activity participation with tolerable pain  -       Row Name 11/18/24 1059          Plan of Care Review    Plan of Care Reviewed With patient  -     Progress no change  -     Outcome Evaluation PT eval completed.  Pt pleasant and agreeable to therapy.  Pt reports N/T in B feet.  Open wounds L foot.   Reports he uses a boot at home and a rwx.  Pt encouraged to have spouse bring boot into hospital for use w/ out of bed activity.  Pt w/ generalized weakness and c/os abdominal pain rated 7/10.  Pt performed bed mobility supine to sit w/ SBA.  Sit to/from standing w/ Min assist.  Attempted stand pivot tfer bed to chair, however unable to safely stay balanced to complete.  Pt was educated for NWB to protect L foot to assist w/ healing of wounds.  Pt returned to supine and scooted up in bed w/ SBA.  Pt will benefit from continued PT services to improve activity tolerance, balance, safety/falls prevention, skin protection, to improve strength and progress functional mobility w/ less need for assist.  Discharge plans pending pt progress.  -       Row Name 11/18/24 1059          Therapy Assessment/Plan (PT)    Patient/Family Therapy Goals Statement (PT) decrease pain  -     Rehab Potential (PT) good  -     Criteria for Skilled Interventions Met (PT) yes;meets criteria;skilled treatment is necessary  -     Therapy Frequency (PT) 2 times/day  -     Predicted Duration of Therapy Intervention (PT) until discharge or goals achieved  -       Row Name 11/18/24 1059          Vital Signs    O2 Delivery Pre Treatment room air  -JE     O2 Delivery Intra Treatment room air  -JE     O2 Delivery Post Treatment room air  -JE     Pre Patient Position Supine  -     Intra Patient Position Standing  -     Post Patient Position Supine  -       Row Name 11/18/24 1059          Positioning and Restraints    Pre-Treatment Position in bed  -JE     Post Treatment Position bed  -JE     In Bed fowlers;call light within reach;encouraged to call for assist;side rails up x2;LLE elevated  -               User Key  (r) = Recorded By, (t) = Taken By, (c) = Cosigned By      Initials Name Provider Type    Melly Brown, PT Physical Therapist                   Outcome Measures       Row Name 11/18/24 1059 11/18/24 0104       How  much help from another person do you currently need...    Turning from your back to your side while in flat bed without using bedrails? 4  -JE 3  -JR    Moving from lying on back to sitting on the side of a flat bed without bedrails? 4  -JE 3  -JR    Moving to and from a bed to a chair (including a wheelchair)? 3  -JE 2  -JR    Standing up from a chair using your arms (e.g., wheelchair, bedside chair)? 3  -JE 2  -JR    Climbing 3-5 steps with a railing? 1  -JE 1  -JR    To walk in hospital room? 2  -JE 2  -JR    AM-PAC 6 Clicks Score (PT) 17  - 13  -JR    Highest Level of Mobility Goal 5 --> Static standing  - 4 --> Transfer to chair/commode  -      Row Name 11/18/24 1059          Functional Assessment    Outcome Measure Options AM-PAC 6 Clicks Basic Mobility (PT)  -               User Key  (r) = Recorded By, (t) = Taken By, (c) = Cosigned By      Initials Name Provider Type    Melly Brown, PT Physical Therapist    Sabas Young, RN Registered Nurse                                 Physical Therapy Education       Title: PT OT SLP Therapies (In Progress)       Topic: Physical Therapy (Not Started)       Point: Mobility training (Done)       Learning Progress Summary            Patient Acceptance, E,TB,D, VU,NR by  at 11/18/2024 1128    Comment: education re: purpose of PT/importance of activity, safety/falls prevention, NWB L LE due to open wounds, improved tech w/ tfers, positioning w/ L LE elevated                      Point: Home exercise program (Not Started)       Learner Progress:  Not documented in this visit.              Point: Precautions (Done)       Learning Progress Summary            Patient Acceptance, E,TB,D, VU,NR by  at 11/18/2024 1128    Comment: education re: purpose of PT/importance of activity, safety/falls prevention, NWB L LE due to open wounds, improved tech w/ tfers, positioning w/ L LE elevated                                      User Key       Initials Effective  Dates Name Provider Type Discipline     08/02/18 -  Melly Mustafa, PT Physical Therapist PT                  PT Recommendation and Plan  Planned Therapy Interventions (PT): balance training, bed mobility training, gait training, home exercise program, patient/family education, postural re-education, strengthening, transfer training, other (see comments) (safety/falls prevention, skin protection/pressure relief.)  Progress: no change  Outcome Evaluation: PT eval completed.  Pt pleasant and agreeable to therapy.  Pt reports N/T in B feet.  Open wounds L foot.  Reports he uses a boot at home and a rwx.  Pt encouraged to have spouse bring boot into hospital for use w/ out of bed activity.  Pt w/ generalized weakness and c/os abdominal pain rated 7/10.  Pt performed bed mobility supine to sit w/ SBA.  Sit to/from standing w/ Min assist.  Attempted stand pivot tfer bed to chair, however unable to safely stay balanced to complete.  Pt was educated for NWB to protect L foot to assist w/ healing of wounds.  Pt returned to supine and scooted up in bed w/ SBA.  Pt will benefit from continued PT services to improve activity tolerance, balance, safety/falls prevention, skin protection, to improve strength and progress functional mobility w/ less need for assist.  Discharge plans pending pt progress.     Time Calculation:         PT Charges       Row Name 11/18/24 1156             Time Calculation    Start Time 1059  -      Stop Time 1156  -      Time Calculation (min) 57 min  -      PT Received On 11/18/24  -      PT Goal Re-Cert Due Date 11/28/24  -                User Key  (r) = Recorded By, (t) = Taken By, (c) = Cosigned By      Initials Name Provider Type    Melly Brown, PT Physical Therapist                  Therapy Charges for Today       Code Description Service Date Service Provider Modifiers Qty    [id number] HC PT EVAL MOD COMPLEXITY 4 11/18/2024 Melly Mustafa, PT GP 1            PT  G-Codes  Outcome Measure Options: AM-PAC 6 Clicks Basic Mobility (PT)  AM-PAC 6 Clicks Score (PT): 17  PT Discharge Summary  Anticipated Discharge Disposition (PT): other (see comments) (pending progress)    Melly Mustafa, PT  11/18/2024

## 2024-11-18 NOTE — ED PROVIDER NOTES
Subjective   History of Present Illness  Pt presents to the  with report of not feeling well for the past week.  Pt reports that he has had congestion/cough.  Has had HA and body aches.  + subjective fevers.  No vomiting/diarrhea.  Pt reports that he has had dark stools/rectal bleeding.  Also states his urine has been black.  No known sick contacts.  No trouble breathing.  Pt has chronic wounds to L foot - has been being followed in [place] and well healing per report.          Review of Systems   Constitutional:  Positive for chills and fatigue.   HENT:  Positive for congestion and sinus pressure.    Eyes:  Negative for visual disturbance.   Respiratory:  Positive for cough. Negative for shortness of breath.    Cardiovascular:  Negative for chest pain.   Gastrointestinal:  Positive for anal bleeding. Negative for diarrhea, nausea and vomiting.   Genitourinary:  Negative for dysuria.        Reports dark urine   Musculoskeletal:  Positive for myalgias.   Skin:  Positive for wound.   Neurological:  Positive for headaches.   All other systems reviewed and are negative.      Past Medical History:   Diagnosis Date    Arthritis     Autonomic disease     CAD (coronary artery disease) 02/06/2017    Cervical radiculopathy 09/16/2021    Chronic constipation with acute exaccerbation 05/10/2021    Coronary artery disease     Degeneration of cervical intervertebral disc 08/11/2021    Diabetes mellitus     Diabetic foot ulcer 08/31/2020    Diabetic polyneuropathy associated with type 2 diabetes mellitus 01/18/2021    Elevated cholesterol     Gastroesophageal reflux disease 05/13/2019    Headache     HTN (hypertension), benign 05/03/2017    Hyperlipidemia     Hypertension     Mixed hyperlipidemia 02/07/2017    Multiple lung nodules 01/26/2020    5mm, 9 mm RLL identified 1/2020, not present 10/2019.    Myocardial infarction     Osteomyelitis 01/22/2020    Osteomyelitis of fifth toe of right foot 10/07/2019    Pancreatitis      "Persistent insomnia 01/20/2020    Renal disorder     Sleep apnea 02/06/2017    Sleep apnea with use of continuous positive airway pressure (CPAP)     NON-COMPLIANT    Slow transit constipation 01/16/2019    Spinal stenosis in cervical region 09/16/2021    Vitamin D deficiency 03/02/2021       Allergies   Allergen Reactions    Cefepime Hives and Anaphylaxis    Bactrim [Sulfamethoxazole-Trimethoprim] Other (See Comments)     \"RENAL FAILURE\"    Vancomycin Itching    Zolpidem Mental Status Change     \"makes him crazy\"    Metronidazole Rash       Past Surgical History:   Procedure Laterality Date    ABDOMINAL SURGERY      AMPUTATION FOOT / TOE Left 10/2021    5th digit     ANTERIOR CERVICAL DISCECTOMY W/ FUSION N/A 08/05/2022    Procedure: CERVICAL DISCECTOMY ANTERIOR WITH FUSION C5-6 with possible plating of C5-7 with neuromonitoring and 1 c-arm;  Surgeon: Karel Soliz MD;  Location: [facility] OR;  Service: Neurosurgery;  Laterality: N/A;    APPENDECTOMY      BACK SURGERY      CARDIAC CATHETERIZATION Left 02/08/2021    Procedure: Left Heart Cath w poss intervention left anatomical snuff box acess;  Surgeon: Omkar Charles DO;  Location: [facility] CATH INVASIVE LOCATION;  Service: Cardiology;  Laterality: Left;    CARDIAC SURGERY      CATARACT EXTRACTION      CERVICAL SPINE SURGERY      COLONOSCOPY N/A 01/31/2017    Normal exam repeat in 5 years    COLONOSCOPY N/A 02/11/2019    Mild acute inflammation    COLONOSCOPY N/A 04/07/2024    2 areas at 10 and 20 cm with friability ulceration 2 clips placed at 20 cm and 4 clips at 10 cm poor prep normal mucosa,mild eroisions and ulcerations in visible vessels    COLONOSCOPY N/A 7/1/2024    Procedure: COLONOSCOPY WITH ANESTHESIA;  Surgeon: Arsalan Lorenzo DO;  Location: [facility] ENDOSCOPY;  Service: Gastroenterology;  Laterality: N/A;  pre op constipation/diarrhea  post poor prep  pcp Del Shetty    COLONOSCOPY N/A 7/2/2024    Procedure: COLONOSCOPY WITH ANESTHESIA;  " Surgeon: Agapito Christopher MD;  Location: [facility] ENDOSCOPY;  Service: Gastroenterology;  Laterality: N/A;  pre rectal bleeding  post poor prep  pcp Del Shetty MD    COLONOSCOPY W/ POLYPECTOMY  2014    Hyperplastic polyp    CORONARY ARTERY BYPASS GRAFT  10/2015    ENDOSCOPY  2011    Gastritis with hemorrhage    ENDOSCOPY N/A 2017    Normal exam    ENDOSCOPY N/A 2019    Gastritis    ENDOSCOPY N/A 2020    Non-erosive gastritis with hemorrhage    ENDOSCOPY N/A 02/10/2021    Esophagitis    ENDOSCOPY N/A 2024    There were esophageal mucosal changes suspicious for short-segment Low's esophagus present in the distal esophagus. The maximum longitudinal extent of these mucosal changes was 2 cm in length. Mucosa was biopsied with a cold forceps for histologyDistal esophagus, biopsies: Mild chronic active esophagogastritis. No evidence of intestinal metaplasia, dysplasia. Antrum, bx, Mild chronic gastritis    FOOT SURGERY Left     INCISION AND DRAINAGE OF WOUND Left 2007    spider bite       Family History   Problem Relation Age of Onset    Colon cancer Father     Heart disease Father     Colon cancer Sister     Colon polyps Sister     Alzheimer's disease Mother     Coronary artery disease Sister     Coronary artery disease Sister        Social History     Socioeconomic History    Marital status:    Tobacco Use    Smoking status: Former     Current packs/day: 0.00     Types: Cigarettes     Quit date:      Years since quittin.9    Smokeless tobacco: Never    Tobacco comments:     smoked in highschool   Vaping Use    Vaping status: Never Used   Substance and Sexual Activity    Alcohol use: No    Drug use: No    Sexual activity: Defer           Objective   Physical Exam  Vitals and nursing note reviewed.   Constitutional:       General: He is not in acute distress.  HENT:      Head: Normocephalic and atraumatic.      Nose: Congestion present.      Mouth/Throat:       Mouth: Mucous membranes are moist.   Eyes:      Conjunctiva/sclera: Conjunctivae normal.   Cardiovascular:      Rate and Rhythm: Normal rate and regular rhythm.      Pulses: Normal pulses.      Heart sounds: Normal heart sounds.   Pulmonary:      Effort: Pulmonary effort is normal.      Breath sounds: Normal breath sounds.   Abdominal:      General: Abdomen is flat. Bowel sounds are normal.      Palpations: Abdomen is soft.   Genitourinary:     Rectum: Guaiac result negative.      Comments: MARA with brown stool, no gross blood  Musculoskeletal:      Comments: L foot with chronic ulcerative wound to heal - well appearing.  No drainage.  Healing wound to medial 1st MT region.  No lymphangitis/swelling   Skin:     General: Skin is warm and dry.      Capillary Refill: Capillary refill takes less than 2 seconds.      Comments: L foot wounds as above   Neurological:      General: No focal deficit present.      Mental Status: He is alert and oriented to person, place, and time.      Sensory: No sensory deficit.      Motor: No weakness.         Procedures           ED Course      Labs Reviewed   COMPREHENSIVE METABOLIC PANEL - Abnormal; Notable for the following components:       Result Value    Glucose 348 (*)     BUN 53 (*)     Creatinine 2.83 (*)     CO2 19.0 (*)     Alkaline Phosphatase 134 (*)     eGFR 23.5 (*)     All other components within normal limits    Narrative:     GFR Normal >60  Chronic Kidney Disease <60  Kidney Failure <15     URINALYSIS W/ MICROSCOPIC IF INDICATED (NO CULTURE) - Abnormal; Notable for the following components:    Appearance, UA Cloudy (*)     Glucose, UA >=1000 mg/dL (3+) (*)     Blood, UA Trace (*)     Protein,  mg/dL (2+) (*)     Leuk Esterase, UA Moderate (2+) (*)     Nitrite, UA Positive (*)     All other components within normal limits   LIPASE - Abnormal; Notable for the following components:    Lipase 11 (*)     All other components within normal limits   CBC WITH AUTO  DIFFERENTIAL - Abnormal; Notable for the following components:    RBC 3.58 (*)     Hemoglobin 10.2 (*)     Hematocrit 30.5 (*)     RDW 15.8 (*)     Lymphocyte % 12.6 (*)     Neutrophils, Absolute 8.01 (*)     Monocytes, Absolute 0.91 (*)     All other components within normal limits   URINALYSIS, MICROSCOPIC ONLY - Abnormal; Notable for the following components:    WBC, UA Too Numerous to Count (*)     Bacteria, UA 4+ (*)     Squamous Epithelial Cells, UA 3-6 (*)     All other components within normal limits   RESPIRATORY PANEL PCR W/ COVID-19 (SARS-COV-2), NP SWAB IN UTM/VTP, 2 HR TAT - Normal    Narrative:     In the setting of a positive respiratory panel with a viral infection PLUS a negative procalcitonin without other underlying concern for bacterial infection, consider observing off antibiotics or discontinuation of antibiotics and continue supportive care. If the respiratory panel is positive for atypical bacterial infection (Bordetella pertussis, Chlamydophila pneumoniae, or Mycoplasma pneumoniae), consider antibiotic de-escalation to target atypical bacterial infection.   MAGNESIUM - Normal   POCT OCCULT BLOOD STOOL - Normal   BLOOD CULTURE   BLOOD CULTURE   URINE CULTURE   LACTIC ACID, PLASMA   CBC AND DIFFERENTIAL    Narrative:     The following orders were created for panel order CBC & Differential.  Procedure                               Abnormality         Status                     ---------                               -----------         ------                     CBC Auto Differential[[id number]]        Abnormal            Final result                 Please view results for these tests on the individual orders.     XR Chest 1 View   Final Result   1.  Stable chest exam without acute process. No visualized acute   infiltrate.   2.  Previous coronary bypass.       This report was signed and finalized on 11/17/2024 9:39 PM by Dr King Ceballos.                                                                Medical Decision Making  Pt stable in EC - NAD att.  Pt with findings of acute UTI.  Also with acute on chronic kidney injury.  + hyperglycemia - no ketones in urine so dbt DKA.  Pt given IV levaquin 750mg and 10U insulin IV.  Pt reported some black stools - normal stool on MARA and heme neg.  Have d/w Dr. Alicia for admit for WANDER and UTI    Amount and/or Complexity of Data Reviewed  Labs: ordered.  Radiology: ordered.    Risk  OTC drugs.  Prescription drug management.        Final diagnoses:   Acute UTI (urinary tract infection)   Acute renal failure superimposed on chronic kidney disease, unspecified acute renal failure type, unspecified CKD stage   Acute hyperglycemia       ED Disposition  ED Disposition       ED Disposition   Decision to Admit    Condition   --    Comment   --               No follow-up provider specified.       Medication List      No changes were made to your prescriptions during this visit.            Alfredo Heard, DO  11/17/24 [id number]       Alfredo Heard, DO  11/17/24 [id number]

## 2024-11-18 NOTE — PROGRESS NOTES
"Pharmacy Renal Dose Adjustment     Assessment/Action/Plan:  Based on prescribing information provided by the drug , famotidine 20 mg PO every 12 hours, has been changed to 20 mg PO every 24 hours. Pharmacy will continue to monitor daily and make further adjustment(s) accordingly.     Subjective:  Erick Luong is a 69 y.o. male     Additional Factors Considered:  Patient disposition per documentation  Disease state or condition being treated    Objective:  Ht: 182.9 cm (72\"); Wt: 118 kg (259 lb 3.2 oz)  Estimated Creatinine Clearance: 35.7 mL/min (A) (by C-G formula based on SCr of 2.63 mg/dL (H)).   Creatinine   Date Value Ref Range Status   11/18/2024 2.63 (H) 0.76 - 1.27 mg/dL Final   11/17/2024 2.83 (H) 0.76 - 1.27 mg/dL Final   08/12/2024 1.47 (H) 0.76 - 1.27 mg/dL Final   01/03/2022 2.6 (H) 0.5 - 1.2 mg/dL Final   07/21/2021 2.30 (H) 0.60 - 1.30 mg/dL Final     Comment:     Serial Number: [id number]:  [id number]       Tj Mai, PharmD  11/18/24 13:35 CST    "

## 2024-11-18 NOTE — H&P
[facility] Medicine Services  HISTORY AND PHYSICAL    Date of Admission: 11/17/2024  Primary Care Physician: Del Shetty MD    Subjective   Primary Historian: Patient    Chief Complaint: AMS    History of Present Illness  69-year-old male with past medical history of coronary artery disease, CABG, Afib, diabetes mellitus insulin-dependent, diabetic foot ulcer, hypertension, sleep apnea noncompliant with CPAP, right seventh rib fracture on 10/20/2024, presents emergency room with complaints of mental status changes disorientation suprapubic pain and burning with urination.  He appears ill and debilitated, although he is afebrile.  Blood work shows normal white blood cell count UA is consistent with UTI.  He has had a recent UTI with ESBL.    Review of Systems   Otherwise complete ROS reviewed and negative except as mentioned in the HPI.    Past Medical History:   Past Medical History:   Diagnosis Date    Arthritis     Autonomic disease     CAD (coronary artery disease) 02/06/2017    Cervical radiculopathy 09/16/2021    Chronic constipation with acute exaccerbation 05/10/2021    Coronary artery disease     Degeneration of cervical intervertebral disc 08/11/2021    Diabetes mellitus     Diabetic foot ulcer 08/31/2020    Diabetic polyneuropathy associated with type 2 diabetes mellitus 01/18/2021    Elevated cholesterol     Gastroesophageal reflux disease 05/13/2019    Headache     HTN (hypertension), benign 05/03/2017    Hyperlipidemia     Hypertension     Mixed hyperlipidemia 02/07/2017    Multiple lung nodules 01/26/2020    5mm, 9 mm RLL identified 1/2020, not present 10/2019.    Myocardial infarction     Osteomyelitis 01/22/2020    Osteomyelitis of fifth toe of right foot 10/07/2019    Pancreatitis     Persistent insomnia 01/20/2020    Renal disorder     Sleep apnea 02/06/2017    Sleep apnea with use of continuous positive airway pressure (CPAP)     NON-COMPLIANT    Slow  transit constipation 01/16/2019    Spinal stenosis in cervical region 09/16/2021    Vitamin D deficiency 03/02/2021     Past Surgical History:  Past Surgical History:   Procedure Laterality Date    ABDOMINAL SURGERY      AMPUTATION FOOT / TOE Left 10/2021    5th digit     ANTERIOR CERVICAL DISCECTOMY W/ FUSION N/A 08/05/2022    Procedure: CERVICAL DISCECTOMY ANTERIOR WITH FUSION C5-6 with possible plating of C5-7 with neuromonitoring and 1 c-arm;  Surgeon: Karel Soliz MD;  Location: [facility] OR;  Service: Neurosurgery;  Laterality: N/A;    APPENDECTOMY      BACK SURGERY      CARDIAC CATHETERIZATION Left 02/08/2021    Procedure: Left Heart Cath w poss intervention left anatomical snuff box acess;  Surgeon: Omkar Charles DO;  Location: [facility] CATH INVASIVE LOCATION;  Service: Cardiology;  Laterality: Left;    CARDIAC SURGERY      CATARACT EXTRACTION      CERVICAL SPINE SURGERY      COLONOSCOPY N/A 01/31/2017    Normal exam repeat in 5 years    COLONOSCOPY N/A 02/11/2019    Mild acute inflammation    COLONOSCOPY N/A 04/07/2024    2 areas at 10 and 20 cm with friability ulceration 2 clips placed at 20 cm and 4 clips at 10 cm poor prep normal mucosa,mild eroisions and ulcerations in visible vessels    COLONOSCOPY N/A 7/1/2024    Procedure: COLONOSCOPY WITH ANESTHESIA;  Surgeon: Arsalan Lorenzo DO;  Location: [facility] ENDOSCOPY;  Service: Gastroenterology;  Laterality: N/A;  pre op constipation/diarrhea  post poor prep  pcp Del Shetty    COLONOSCOPY N/A 7/2/2024    Procedure: COLONOSCOPY WITH ANESTHESIA;  Surgeon: Agapito Christopher MD;  Location: [facility] ENDOSCOPY;  Service: Gastroenterology;  Laterality: N/A;  pre rectal bleeding  post poor prep  pcp Del Shetty MD    COLONOSCOPY W/ POLYPECTOMY  03/04/2014    Hyperplastic polyp    CORONARY ARTERY BYPASS GRAFT  10/2015    ENDOSCOPY  04/13/2011    Gastritis with hemorrhage    ENDOSCOPY N/A 05/05/2017    Normal exam    ENDOSCOPY N/A 02/11/2019     "Gastritis    ENDOSCOPY N/A 09/01/2020    Non-erosive gastritis with hemorrhage    ENDOSCOPY N/A 02/10/2021    Esophagitis    ENDOSCOPY N/A 04/11/2024    There were esophageal mucosal changes suspicious for short-segment Low's esophagus present in the distal esophagus. The maximum longitudinal extent of these mucosal changes was 2 cm in length. Mucosa was biopsied with a cold forceps for histologyDistal esophagus, biopsies: Mild chronic active esophagogastritis. No evidence of intestinal metaplasia, dysplasia. Antrum, bx, Mild chronic gastritis    FOOT SURGERY Left     INCISION AND DRAINAGE OF WOUND Left 09/2007    spider bite     Social History:  reports that he quit smoking about 33 years ago. His smoking use included cigarettes. He has never used smokeless tobacco. He reports that he does not drink alcohol and does not use drugs.    Family History: family history includes Alzheimer's disease in his mother; Colon cancer in his father and sister; Colon polyps in his sister; Coronary artery disease in his sister and sister; Heart disease in his father.       Allergies:  Allergies   Allergen Reactions    Cefepime Hives and Anaphylaxis    Bactrim [Sulfamethoxazole-Trimethoprim] Other (See Comments)     \"RENAL FAILURE\"    Vancomycin Itching    Zolpidem Mental Status Change     \"makes him crazy\"    Metronidazole Rash       Medications:  Prior to Admission medications    Medication Sig Start Date End Date Taking? Authorizing Provider   apixaban (ELIQUIS) 5 MG tablet tablet Take 1 tablet by mouth 2 (Two) Times a Day. 8/12/24   Payam Keyes,    apixaban (Eliquis) 5 MG tablet tablet Take 1 tablet by mouth 2 (Two) Times a Day. 10/17/24      ascorbic acid (VITAMIN C) 1000 MG tablet Take 1 tablet by mouth Daily. 8/25/23      bumetanide (BUMEX) 1 MG tablet Take 1 tablet by mouth 2 (Two) Times a Day As Needed for weight gain >2 pounds in a day 10/17/24      bumetanide (BUMEX) 2 MG tablet Take 1 tablet by mouth 2 " (Two) Times a Day. 10/29/24      busPIRone (BUSPAR) 10 MG tablet Take 1 tablet by mouth 3 (Three) Times a Day As Needed. 10/31/24      donepezil (ARICEPT) 10 MG tablet Take 1 tablet by mouth every night at bedtime. 10/17/24      DULoxetine (CYMBALTA) 60 MG capsule Take 1 capsule by mouth Daily. 3/11/24      DULoxetine (CYMBALTA) 60 MG capsule Take 1 capsule by mouth Daily. 10/17/24      famotidine (PEPCID) 20 MG tablet Take 1 tablet by mouth Every 12 (Twelve) Hours. 3/26/24      famotidine (PEPCID) 20 MG tablet Take 1 tablet by mouth 2 (Two) Times a Day. 10/17/24      Insulin Glargine (Lantus SoloStar) 100 UNIT/ML injection pen Inject 20 Units under the skin into the appropriate area as directed every night at bedtime. 5/9/24      insulin glargine (Lantus) 100 UNIT/ML injection Inject 35 Units under the skin into the appropriate area as directed every night at bedtime. 10/31/24      Insulin Lispro (HumaLOG) 100 UNIT/ML injection Inject subcutaneously four times a day per sliding scale:  0-150 = 0 units; 151-200 = 2u; 201-250 = 4u; 251-300 = 6u; 301-350 = 8u; 351-400 = 10u; 401+ = 12u 10/17/24      Insulin Regular Human, Conc, (HumuLIN R) 500 UNIT/ML solution pen-injector CONCENTRATED injection Inject 40 Units under the skin into the appropriate area as directed 2 (Two) Times a Day Before Meals. Inject 40 units under the skin in the the appropriate area with regular meals AND 40 units with large meals.    Provider, MD Bossman   lisinopril (PRINIVIL,ZESTRIL) 5 MG tablet Take 1 tablet by mouth Daily. 10/17/24      melatonin 3 MG tablet Take 2 tablets by mouth At Night As Needed for Sleep. 4/11/24   Rene Maloney MD   midodrine (PROAMATINE) 2.5 MG tablet Take 1 tablet by mouth 2 (Two) Times a Day. 10/29/24      nitroglycerin (NITROSTAT) 0.4 MG SL tablet Place 1 tablet under the tongue Every 5 (Five) Minutes As Needed for Chest Pain. Take no more than 3 doses in 15 minutes.    Provider, MD Bossman    oxyCODONE (ROXICODONE) 10 MG tablet Take 1 tablet by mouth 2 (Two) Times a Day As Needed. 11/13/24      oxyCODONE-acetaminophen (PERCOCET) 5-325 MG per tablet Take 1 tablet by mouth Every 6 (Six) Hours As Needed for Moderate Pain. 8/12/24   Payam Keyes DO   pantoprazole (PROTONIX) 40 MG EC tablet Take 1 tablet by mouth Every 12 (Twelve) Hours. 6/19/24      pantoprazole (PROTONIX) 40 MG EC tablet Take 1 tablet by mouth 2 (Two) Times a Day. 10/17/24      polyethylene glycol (MIRALAX) 17 GM/SCOOP powder Take 17 g by mouth Daily As Needed (constipation).    Provider, MD Bossman   potassium chloride ER (K-TAB) 20 MEQ tablet controlled-release ER tablet Take 1 tablet by mouth Daily. 10/17/24      pregabalin (LYRICA) 100 MG capsule Take 1 capsule by mouth Every Night. 8/12/24   Payam Keyes DO   pregabalin (LYRICA) 100 MG capsule Take 1 capsule by mouth Every Morning , THEN 2 capsules Every Night 10/23/24 12/22/24     pregabalin (LYRICA) 50 MG capsule Take 1 capsule by mouth Daily. 8/13/24   Payam Keyes DO   rosuvastatin (CRESTOR) 10 MG tablet Take 1 tablet by mouth Daily. 10/17/24      sennosides-docusate (PERICOLACE) 8.6-50 MG per tablet Take 1 tablet by mouth Every Night. Obtain OTC 8/23/23   Lexie Houston APRN   sertraline (ZOLOFT) 25 MG tablet Take 1 tablet by mouth Daily 10/17/24      sodium bicarbonate 650 MG tablet Take 1 tablet by mouth 3 (Three) Times a Day. 8/12/24   Payam Keyes DO   sodium hypochlorite (DAKIN'S 1/4 STRENGTH) 0.125 % solution topical solution 0.125% Apply  topically to the appropriate area as directed 2 (Two) Times a Day. 10/28/24      tamsulosin (FLOMAX) 0.4 MG capsule 24 hr capsule Take 1 capsule by mouth Daily. 10/29/24      tamsulosin (FLOMAX) 0.4 MG capsule 24 hr capsule Take 1 capsule by mouth Daily. 10/29/24      tamsulosin (FLOMAX) 0.4 MG capsule 24 hr capsule Take 1 capsule by mouth Daily. 10/29/24      spironolactone (ALDACTONE) 25 MG tablet Take  "1 tablet by mouth Daily. 9/28/20 12/31/20  Del Shetty MD     I have utilized all available immediate resources to obtain, update, or review the patient's current medications (including all prescriptions, over-the-counter products, herbals, cannabis/cannabidiol products, and vitamin/mineral/dietary (nutritional) supplements).    Objective     Vital Signs: /58 (BP Location: Left arm, Patient Position: Lying)   Pulse 74   Temp 98.5 °F (36.9 °C) (Oral)   Resp 16   Ht 182.9 cm (72\")   Wt 118 kg (259 lb 3.2 oz)   SpO2 99%   BMI 35.15 kg/m²   Physical Exam  Constitutional:       Appearance: He is well-developed. He is ill-appearing. He is not diaphoretic.   HENT:      Head: Normocephalic and atraumatic.      Right Ear: External ear normal.      Left Ear: External ear normal.      Nose: Nose normal.      Mouth/Throat:      Mouth: Mucous membranes are dry.   Eyes:      General:         Right eye: No discharge.         Left eye: No discharge.      Extraocular Movements: Extraocular movements intact.      Conjunctiva/sclera: Conjunctivae normal.      Pupils: Pupils are equal, round, and reactive to light.   Neck:      Vascular: No JVD.   Cardiovascular:      Rate and Rhythm: Normal rate and regular rhythm.      Heart sounds: Normal heart sounds.   Pulmonary:      Effort: Pulmonary effort is normal. No respiratory distress.      Breath sounds: Normal breath sounds. No wheezing or rales.   Chest:      Chest wall: No tenderness.   Abdominal:      General: Bowel sounds are normal. There is no distension.      Palpations: Abdomen is soft.      Tenderness: There is abdominal tenderness (suprapubic). There is no guarding or rebound.   Musculoskeletal:         General: No tenderness or deformity. Normal range of motion.      Cervical back: Normal range of motion and neck supple. No rigidity.      Right lower leg: No edema.      Left lower leg: No edema.   Skin:     General: Skin is warm and dry.      Findings: No " rash.   Neurological:      General: No focal deficit present.      Mental Status: He is alert. Mental status is at baseline. He is disoriented.      Cranial Nerves: No cranial nerve deficit.      Sensory: No sensory deficit.      Motor: Weakness present. No abnormal muscle tone.      Deep Tendon Reflexes: Reflexes normal.   Psychiatric:         Mood and Affect: Mood normal.         Behavior: Behavior normal.     Results Reviewed:  Lab Results (last 24 hours)       Procedure Component Value Units Date/Time    Blood Culture - Blood, Arm, Right [[id number]] Collected: 11/18/24 0106    Specimen: Blood from Arm, Right Updated: 11/18/24 0131    Lactic Acid, Plasma [[id number]]  (Normal) Collected: 11/17/24 2335    Specimen: Blood Updated: 11/18/24 0001     Lactate 1.3 mmol/L     Blood Culture - Blood, Hand, Right [[id number]] Collected: 11/17/24 2335    Specimen: Blood from Hand, Right Updated: 11/17/24 2342    Respiratory Panel PCR w/COVID-19(SARS-CoV-2) MICHAEL/ANKUSH/SEAN/PAD/COR/ROSE MARY In-House, NP Swab in UTM/VTM, 2 HR TAT - Swab, Nasopharynx [[id number]]  (Normal) Collected: 11/17/24 2155    Specimen: Swab from Nasopharynx Updated: 11/17/24 2254     ADENOVIRUS, PCR Not Detected     Coronavirus 229E Not Detected     Coronavirus HKU1 Not Detected     Coronavirus NL63 Not Detected     Coronavirus OC43 Not Detected     COVID19 Not Detected     Human Metapneumovirus Not Detected     Human Rhinovirus/Enterovirus Not Detected     Influenza A PCR Not Detected     Influenza B PCR Not Detected     Parainfluenza Virus 1 Not Detected     Parainfluenza Virus 2 Not Detected     Parainfluenza Virus 3 Not Detected     Parainfluenza Virus 4 Not Detected     RSV, PCR Not Detected     Bordetella pertussis pcr Not Detected     Bordetella parapertussis PCR Not Detected     Chlamydophila pneumoniae PCR Not Detected     Mycoplasma pneumo by PCR Not Detected    Narrative:      In the setting of a positive respiratory panel with a viral infection PLUS a  negative procalcitonin without other underlying concern for bacterial infection, consider observing off antibiotics or discontinuation of antibiotics and continue supportive care. If the respiratory panel is positive for atypical bacterial infection (Bordetella pertussis, Chlamydophila pneumoniae, or Mycoplasma pneumoniae), consider antibiotic de-escalation to target atypical bacterial infection.    Urine Culture - Urine, Urine, Clean Catch [[id number]] Collected: 11/17/24 2213    Specimen: Urine, Clean Catch Updated: 11/17/24 2242    Urinalysis With Microscopic If Indicated (No Culture) - Urine, Clean Catch [[id number]]  (Abnormal) Collected: 11/17/24 2213    Specimen: Urine, Clean Catch Updated: 11/17/24 2224     Color, UA Yellow     Appearance, UA Cloudy     pH, UA 5.5     Specific Gravity, UA 1.019     Glucose, UA >=1000 mg/dL (3+)     Ketones, UA Negative     Bilirubin, UA Negative     Blood, UA Trace     Protein,  mg/dL (2+)     Leuk Esterase, UA Moderate (2+)     Nitrite, UA Positive     Urobilinogen, UA 1.0 E.U./dL    Urinalysis, Microscopic Only - Urine, Clean Catch [[id number]]  (Abnormal) Collected: 11/17/24 2213    Specimen: Urine, Clean Catch Updated: 11/17/24 2224     RBC, UA 0-2 /HPF      WBC, UA Too Numerous to Count /HPF      Bacteria, UA 4+ /HPF      Squamous Epithelial Cells, UA 3-6 /HPF      Hyaline Casts, UA None Seen /LPF      Methodology Automated Microscopy    Comprehensive Metabolic Panel [[id number]]  (Abnormal) Collected: 11/17/24 2153    Specimen: Blood from Hand, Left Updated: 11/17/24 2222     Glucose 348 mg/dL      BUN 53 mg/dL      Creatinine 2.83 mg/dL      Sodium 138 mmol/L      Potassium 4.4 mmol/L      Chloride 106 mmol/L      CO2 19.0 mmol/L      Calcium 8.6 mg/dL      Total Protein 6.3 g/dL      Albumin 3.5 g/dL      ALT (SGPT) 19 U/L      AST (SGOT) 13 U/L      Alkaline Phosphatase 134 U/L      Total Bilirubin 0.3 mg/dL      Globulin 2.8 gm/dL      A/G Ratio 1.3 g/dL       BUN/Creatinine Ratio 18.7     Anion Gap 13.0 mmol/L      eGFR 23.5 mL/min/1.73     Narrative:      GFR Normal >60  Chronic Kidney Disease <60  Kidney Failure <15      Magnesium [[id number]]  (Normal) Collected: 11/17/24 2153    Specimen: Blood from Hand, Left Updated: 11/17/24 2222     Magnesium 2.0 mg/dL     Lipase [[id number]]  (Abnormal) Collected: 11/17/24 2153    Specimen: Blood from Hand, Left Updated: 11/17/24 2217     Lipase 11 U/L     CBC & Differential [[id number]]  (Abnormal) Collected: 11/17/24 2153    Specimen: Blood from Hand, Left Updated: 11/17/24 2205    Narrative:      The following orders were created for panel order CBC & Differential.  Procedure                               Abnormality         Status                     ---------                               -----------         ------                     CBC Auto Differential[[id number]]        Abnormal            Final result                 Please view results for these tests on the individual orders.    CBC Auto Differential [[id number]]  (Abnormal) Collected: 11/17/24 2153    Specimen: Blood from Hand, Left Updated: 11/17/24 2205     WBC 10.64 10*3/mm3      RBC 3.58 10*6/mm3      Hemoglobin 10.2 g/dL      Hematocrit 30.5 %      MCV 85.2 fL      MCH 28.5 pg      MCHC 33.4 g/dL      RDW 15.8 %      RDW-SD 49.5 fl      MPV 11.7 fL      Platelets 255 10*3/mm3      Neutrophil % 75.2 %      Lymphocyte % 12.6 %      Monocyte % 8.6 %      Eosinophil % 2.7 %      Basophil % 0.5 %      Immature Grans % 0.4 %      Neutrophils, Absolute 8.01 10*3/mm3      Lymphocytes, Absolute 1.34 10*3/mm3      Monocytes, Absolute 0.91 10*3/mm3      Eosinophils, Absolute 0.29 10*3/mm3      Basophils, Absolute 0.05 10*3/mm3      Immature Grans, Absolute 0.04 10*3/mm3      nRBC 0.0 /100 WBC     POC Occult Blood Stool [[id number]]  (Normal) Resulted: 11/17/24 2121    Specimen: Stool Updated: 11/17/24 2121     Fecal Occult Blood Negative     Lot Number [id number]     Expiration Date  4/30/2025     DEVELOPER LOT NUMBER 275     DEVELOPER EXPIRATION DATE 4/30/2025     Positive Control Positive     Negative Control Negative          Imaging Results (Last 24 Hours)       Procedure Component Value Units Date/Time    XR Chest 1 View [[id number]] Collected: 11/17/24 2138     Updated: 11/17/24 2143    Narrative:      XR CHEST 1 VW- 11/17/2024 8:33 PM     HISTORY: cough       COMPARISON: 10/20/2024     FINDINGS:  Upright frontal radiograph of the chest was obtained     No lung consolidation. No pleural effusion or pneumothorax. Prior  coronary bypass. Heart size is stable. Pulmonary vasculature are  nondilated. The osseous structures and surrounding soft tissues  demonstrate no acute abnormality.       Impression:      1.  Stable chest exam without acute process. No visualized acute  infiltrate.  2.  Previous coronary bypass.     This report was signed and finalized on 11/17/2024 9:39 PM by Dr King Ceballos.             I have personally reviewed and interpreted the radiology studies and ECG obtained at time of admission.     Assessment / Plan   Assessment:   Active Hospital Problems    Diagnosis     **Acute UTI (urinary tract infection)     Acute encephalopathy     Type 2 diabetes mellitus with hyperglycemia, with long-term current use of insulin     Atrial fibrillation     Chronic heart failure with preserved ejection fraction (HFpEF)     Stage 3b chronic kidney disease     CAD (coronary artery disease)     BPH without obstruction/lower urinary tract symptoms     Diabetic ulcer of left foot associated with type 2 diabetes mellitus     Sleep apnea with use of continuous positive airway pressure (CPAP)     Essential hypertension      Treatment Plan  The patient will be admitted to my service here at [facility].   Admit to med floor  Vitals every 4 hours  Diet cardiac consistent carbohydrate  IV fluids normal saline 100 cc an hour for 10 hours  Activity up with  assistance and fall  precautions    UTI with history of ESBL UTI  Antibiotics Levaquin 750 mg IV DE every 48 hours selected due to recent urine culture sensitivities  Follow urine and blood cultures    Acute encephalopathy due to UTI and possibly hyperosmolar syndrome  Correct underlying problems  Frequent reorientation and fall precautions    Recent rib fracture and chronic pain  Will stop prescribed oxycodone 10 as needed and continue with home dose of Percocet 5 every 6 hours as needed    Diabetes mellitus type 2 insulin-dependent with hyperglycemia  Insulin regular IV given in the ER will recheck  Continue Lantus 20 units nightly  Humalog 10 units Premeal 3 times daily and Humalog moderate dose sliding scale ACHS  Hypoglycemia protocol    Diabetic foot ulcers left foot  See wound image in media section of chart, the wound appears clean with good margins.  Continue local care and podiatry follow-up outpatient    Atrial fibrillation/CAD/chronic diastolic congestive heart failure  Continue Eliquis 5 mg p.o. twice daily  Coreg was discontinued during the last hospitalization in August he does not list any other rate controlling medications    Obstructive sleep apnea patient noncompliant with CPAP  Monitoring and oxygen as needed    DVT prophylaxis patient anticoagulated    Medical Decision Making  Number and Complexity of problems: 4 complex problems  Differential Diagnosis: As above    Conditions and Status        Condition is unchanged.     [facility] Data  External documents reviewed: [employer] EHR  Cardiac tracing (EKG, telemetry) interpretation: NSR  Radiology interpretation: As above  Labs reviewed: As above  Any tests that were considered but not ordered: None     Decision rules/scores evaluated (example [id number], Wells, etc): [id number]     Discussed with: Patient     Care Planning  Shared decision making: Patient  Code status and discussions: Full code    Disposition  Social Determinants of Health that impact treatment or  disposition: None  Estimated length of stay is over 2 midnights.     I confirmed that the patient's advanced care plan is present, code status is documented, and a surrogate decision maker is listed in the patient's medical record.     The patient's surrogate decision maker is family, see records.     The patient was seen and examined by me on 11/18/2024 at 0 1:43 AM.    Electronically signed by Garrett Alicia MD, 11/18/24, 01:43 CST.             Rola Wolfe, APRN

## 2024-11-18 NOTE — PLAN OF CARE
Goal Outcome Evaluation:  Plan of Care Reviewed With: patient        Progress: no change  Outcome Evaluation: Pt admit this shift from ED with UTI. IVF and IV abx infused. C/o pain and nausea, see MAR. Voiding via urinal. Up x1 w/ walker. Accucheck ac/hs. Wounds to left foot, see pictures in chart. Contact precautions. Tele-AFIB. Safety maintained.

## 2024-11-18 NOTE — PROGRESS NOTES
[facility] Medicine Services  INPATIENT PROGRESS NOTE    Patient Name: Erick Luong  Date of Admission: 11/17/2024  Today's Date: 11/18/24  Length of Stay: 0  Primary Care Physician: Del Shetty MD    Subjective   Chief Complaint: UTI/failure to thrive/foot wound.  HPI   Blood pressures on low side but stable, slightly bradycardia.  Patient is oriented x 3.  Patient is on room air.  Acidosis noted, bicarb . Slight improvement in creatinine, above baseline, consult nephrology, ultrasound the kidneys.  Slight decrease in hemoglobin.  White blood cells normal.    Review of Systems   Constitutional:  Positive for activity change, appetite change and fatigue. Negative for chills and fever.   HENT:  Negative for hearing loss, nosebleeds, tinnitus and trouble swallowing.    Eyes:  Negative for visual disturbance.   Respiratory:  Negative for cough, chest tightness, shortness of breath and wheezing.    Cardiovascular:  Negative for chest pain, palpitations and leg swelling.   Gastrointestinal:  Negative for abdominal distention, abdominal pain, blood in stool, constipation, diarrhea, nausea and vomiting.   Endocrine: Negative for cold intolerance, heat intolerance, polydipsia, polyphagia and polyuria.   Genitourinary:  Negative for decreased urine volume, difficulty urinating, dysuria, flank pain, frequency and hematuria.   Musculoskeletal:  Positive for arthralgias, gait problem and myalgias. Negative for joint swelling.   Skin:  Negative for rash.   Allergic/Immunologic: Negative for immunocompromised state.   Neurological:  Positive for weakness. Negative for dizziness, syncope, light-headedness and headaches.   Hematological:  Negative for adenopathy. Does not bruise/bleed easily.   Psychiatric/Behavioral:  Negative for confusion and sleep disturbance. The patient is not nervous/anxious.         All pertinent negatives and positives are as above. All other systems have been  reviewed and are negative unless otherwise stated.     Objective    Temp:  [97.5 °F (36.4 °C)-99 °F (37.2 °C)] 97.5 °F (36.4 °C)  Heart Rate:  [59-91] 59  Resp:  [16-22] 20  BP: (106-134)/(58-84) 106/84  Physical Exam  Vitals and nursing note reviewed.   Constitutional:       Comments: Chronically ill.   HENT:      Head: Normocephalic.   Eyes:      Conjunctiva/sclera: Conjunctivae normal.      Pupils: Pupils are equal, round, and reactive to light.   Cardiovascular:      Rate and Rhythm: Bradycardia present. Rhythm irregular.      Heart sounds: Normal heart sounds.   Pulmonary:      Effort: No respiratory distress.   Abdominal:      General: Bowel sounds are normal. There is no distension.      Palpations: Abdomen is soft.      Tenderness: There is no abdominal tenderness.      Comments: Obesity.   Musculoskeletal:         General: No swelling.      Cervical back: Neck supple.   Skin:     General: Skin is warm and dry.      Capillary Refill: Capillary refill takes 2 to 3 seconds.      Findings: No rash.      Comments: Left foot wound, dressing in place.   Neurological:      Mental Status: He is alert and oriented to person, place, and time.      Motor: Weakness present.      Coordination: Coordination abnormal.      Gait: Gait abnormal.   Psychiatric:         Mood and Affect: Mood normal.         Behavior: Behavior normal.         Thought Content: Thought content normal.             Results Review:  I have reviewed the labs, radiology results, and diagnostic studies.    Laboratory Data:   Results from last 7 days   Lab Units 11/18/24  0416 11/17/24  2153   WBC 10*3/mm3 9.37 10.64   HEMOGLOBIN g/dL 9.7* 10.2*   HEMATOCRIT % 29.3* 30.5*   PLATELETS 10*3/mm3 242 255        Results from last 7 days   Lab Units 11/18/24  0416 11/17/24  2153   SODIUM mmol/L 140 138   POTASSIUM mmol/L 3.5 4.4   CHLORIDE mmol/L 108* 106   CO2 mmol/L 16.0* 19.0*   BUN mg/dL 49* 53*   CREATININE mg/dL 2.63* 2.83*   CALCIUM mg/dL 8.6 8.6  "  BILIRUBIN mg/dL  --  0.3   ALK PHOS U/L  --  134*   ALT (SGPT) U/L  --  19   AST (SGOT) U/L  --  13   GLUCOSE mg/dL 106* 348*       Culture Data:   No results found for: \"BLOODCX\", \"URINECX\", \"WOUNDCX\", \"MRSACX\", \"RESPCX\", \"STOOLCX\"    Radiology Data:   Imaging Results (Last 24 Hours)       Procedure Component Value Units Date/Time    XR Chest 1 View [[id number]] Collected: 11/17/24 2138     Updated: 11/17/24 2143    Narrative:      XR CHEST 1 VW- 11/17/2024 8:33 PM     HISTORY: cough       COMPARISON: 10/20/2024     FINDINGS:  Upright frontal radiograph of the chest was obtained     No lung consolidation. No pleural effusion or pneumothorax. Prior  coronary bypass. Heart size is stable. Pulmonary vasculature are  nondilated. The osseous structures and surrounding soft tissues  demonstrate no acute abnormality.       Impression:      1.  Stable chest exam without acute process. No visualized acute  infiltrate.  2.  Previous coronary bypass.     This report was signed and finalized on 11/17/2024 9:39 PM by Dr King Ceballos.               I have reviewed the patient's current medications.     Assessment/Plan   Assessment  Active Hospital Problems    Diagnosis     **Acute UTI (urinary tract infection)     Acute encephalopathy     Type 2 diabetes mellitus with hyperglycemia, with long-term current use of insulin     Atrial fibrillation     Chronic heart failure with preserved ejection fraction (HFpEF)     Stage 3b chronic kidney disease     CAD (coronary artery disease)     BPH without obstruction/lower urinary tract symptoms     Diabetic ulcer of left foot associated with type 2 diabetes mellitus     Sleep apnea with use of continuous positive airway pressure (CPAP)     Essential hypertension        HPI . 69-year-old male with past medical history of coronary artery disease, CABG, Afib, diabetes mellitus insulin-dependent, diabetic foot ulcer, hypertension, sleep apnea noncompliant with CPAP, right seventh rib " fracture on 10/20/2024, presents emergency room with complaints of mental status changes disorientation suprapubic pain and burning with urination.  He appears ill and debilitated, although he is afebrile.  Blood work shows normal white blood cell count UA is consistent with UTI.  He has had a recent UTI with ESBL.     Treatment Plan     UTI . UTI with history of ESBL.  Normal saline 50 cc an hour.  Antibiotics Levaquin 750 mg IV TN every 48 hours selected due to recent urine culture sensitivities.     Acute encephalopathy due to UTI.  Patient is oriented x 3.     Recent rib fracture and chronic pain . 7 right ribs fracture 10/20/2024.     Diabetes mellitus type 2 insulin-dependent with hyperglycemia  Insulin regular IV given in the ER will recheck  Continue Lantus 20 units nightly  Humalog 10 units Premeal 3 times daily and Humalog moderate dose sliding scale ACHS  Hypoglycemia protocol     Diabetic foot ulcers left foot.  Patient follow-up with wound care outpatient Hob[facility]Dr. Gomes, per patient.  Consult wound care.  See wound image in media section of chart, the wound appears clean with good margins.  Continue local care and podiatry follow-up outpatient     Atrial fibrillation/CAD/chronic diastolic congestive heart failure/CABG/hypertension/hyperlipidemia.  Continue Eliquis 5 mg p.o. twice daily.  Bumex twice daily.  Lisinopril . Crestor.  Coreg was discontinued during the last hospitalization in August he does not list any other rate controlling medications.  Echocardiogram 4/2/2024-61 - 65%, mild concentric hypertrophy, left ventricular diastolic function is consistent with (grade III w/high LAP), Mildly reduced right ventricular systolic function, left atrial cavity is mildly dilated, mild calcification of the aortic valve, No aortic valve regurgitation is present, No hemodynamically significant aortic valve stenosis is present.     Acute on chronic stage IIIb renal failure/metabolic acidosis.  Nephrology  consult.  Baseline creatinine 8/4/2024 1.6.  Bicarb p.o.  Ultrasound the kidneys.     Obstructive sleep apnea patient noncompliant with CPAP  Patient is on room air.     DVT prophylaxis patient anticoagulated    Dementia.  Hold Aricept.    Neuropathy.  Lyrica nightly.    Constipation.  Sarah-Colace.    Nausea . Zofran as needed.    Depression/anxiety.  Cut back Cymbalta.    BuSpar as needed.    Prostate hypertrophy.  Flomax.    Nutrition.  Cardiac/diabetic diet.    Deconditioning.  Fall precaution.  PT consult.    Blood culture pending.  Urine culture pending.    Medical Decision Making  Number and Complexity of problems: UTI/altered mental//diabetes/CAD/left foot wound  Differential Diagnosis: None    Conditions and Status        Condition is unchanged.     [facility] Data  External documents reviewed: None.  Cardiac tracing (EKG, telemetry) interpretation: Irregular, rates 59  Radiology interpretation: Echo  Labs reviewed: Laboratory  Any tests that were considered but not ordered: Lab in a.m.     Decision rules/scores evaluated (example [id number], Wells, etc): None     Discussed with: Patient     Care Planning  Shared decision making: Patient  Code status and discussions: Full code.    Disposition  Social Determinants of Health that impact treatment or disposition: From home.  1 to 5 days        Electronically signed by Telly Rodriguez MD, 11/18/24, 08:05 CST.

## 2024-11-18 NOTE — ED NOTES
Patient's wife states patient has started having blood in his urine just this evening as well.   Wife also states bleeding from stool started about a week ago, so they've been holding his Eloquis. Provider is at bedside, so aware.

## 2024-11-19 ENCOUNTER — APPOINTMENT (OUTPATIENT)
Dept: ULTRASOUND IMAGING | Facility: HOSPITAL | Age: 69
DRG: 682 | End: 2024-11-19
Payer: MEDICARE

## 2024-11-19 LAB
25(OH)D3 SERPL-MCNC: 23.5 NG/ML (ref 30–100)
ANION GAP SERPL CALCULATED.3IONS-SCNC: 10 MMOL/L (ref 5–15)
BASOPHILS # BLD AUTO: 0.06 10*3/MM3 (ref 0–0.2)
BASOPHILS NFR BLD AUTO: 0.8 % (ref 0–1.5)
BUN SERPL-MCNC: 45 MG/DL (ref 8–23)
BUN/CREAT SERPL: 17.3 (ref 7–25)
CALCIUM SPEC-SCNC: 8.5 MG/DL (ref 8.6–10.5)
CHLORIDE SERPL-SCNC: 111 MMOL/L (ref 98–107)
CHOLEST SERPL-MCNC: 138 MG/DL (ref 0–200)
CO2 SERPL-SCNC: 21 MMOL/L (ref 22–29)
CORTIS SERPL-MCNC: 5.29 MCG/DL
CREAT SERPL-MCNC: 2.6 MG/DL (ref 0.76–1.27)
DEPRECATED RDW RBC AUTO: 51.3 FL (ref 37–54)
EGFRCR SERPLBLD CKD-EPI 2021: 26 ML/MIN/1.73
EOSINOPHIL # BLD AUTO: 0.29 10*3/MM3 (ref 0–0.4)
EOSINOPHIL NFR BLD AUTO: 3.9 % (ref 0.3–6.2)
ERYTHROCYTE [DISTWIDTH] IN BLOOD BY AUTOMATED COUNT: 15.9 % (ref 12.3–15.4)
GLUCOSE BLDC GLUCOMTR-MCNC: 103 MG/DL (ref 70–130)
GLUCOSE BLDC GLUCOMTR-MCNC: 150 MG/DL (ref 70–130)
GLUCOSE BLDC GLUCOMTR-MCNC: 182 MG/DL (ref 70–130)
GLUCOSE BLDC GLUCOMTR-MCNC: 54 MG/DL (ref 70–130)
GLUCOSE BLDC GLUCOMTR-MCNC: 75 MG/DL (ref 70–130)
GLUCOSE SERPL-MCNC: 148 MG/DL (ref 65–99)
HBA1C MFR BLD: 13.4 % (ref 4.8–5.6)
HCT VFR BLD AUTO: 31 % (ref 37.5–51)
HDLC SERPL-MCNC: 39 MG/DL (ref 40–60)
HGB BLD-MCNC: 9.8 G/DL (ref 13–17.7)
IMM GRANULOCYTES # BLD AUTO: 0.02 10*3/MM3 (ref 0–0.05)
IMM GRANULOCYTES NFR BLD AUTO: 0.3 % (ref 0–0.5)
LDLC SERPL CALC-MCNC: 80 MG/DL (ref 0–100)
LDLC/HDLC SERPL: 2.01 {RATIO}
LYMPHOCYTES # BLD AUTO: 1.25 10*3/MM3 (ref 0.7–3.1)
LYMPHOCYTES NFR BLD AUTO: 17 % (ref 19.6–45.3)
MAGNESIUM SERPL-MCNC: 2 MG/DL (ref 1.6–2.4)
MCH RBC QN AUTO: 27.8 PG (ref 26.6–33)
MCHC RBC AUTO-ENTMCNC: 31.6 G/DL (ref 31.5–35.7)
MCV RBC AUTO: 87.8 FL (ref 79–97)
MONOCYTES # BLD AUTO: 0.81 10*3/MM3 (ref 0.1–0.9)
MONOCYTES NFR BLD AUTO: 11 % (ref 5–12)
NEUTROPHILS NFR BLD AUTO: 4.94 10*3/MM3 (ref 1.7–7)
NEUTROPHILS NFR BLD AUTO: 67 % (ref 42.7–76)
NRBC BLD AUTO-RTO: 0 /100 WBC (ref 0–0.2)
PHOSPHATE SERPL-MCNC: 4 MG/DL (ref 2.5–4.5)
PLATELET # BLD AUTO: 233 10*3/MM3 (ref 140–450)
PMV BLD AUTO: 11.6 FL (ref 6–12)
POTASSIUM SERPL-SCNC: 5 MMOL/L (ref 3.5–5.2)
PTH-INTACT SERPL-MCNC: 71 PG/ML (ref 15–65)
RBC # BLD AUTO: 3.53 10*6/MM3 (ref 4.14–5.8)
SODIUM SERPL-SCNC: 142 MMOL/L (ref 136–145)
TRIGL SERPL-MCNC: 104 MG/DL (ref 0–150)
TSH SERPL DL<=0.05 MIU/L-ACNC: 0.98 UIU/ML (ref 0.27–4.2)
VLDLC SERPL-MCNC: 19 MG/DL (ref 5–40)
WBC NRBC COR # BLD AUTO: 7.37 10*3/MM3 (ref 3.4–10.8)

## 2024-11-19 PROCEDURE — 82533 TOTAL CORTISOL: CPT | Performed by: FAMILY MEDICINE

## 2024-11-19 PROCEDURE — 97110 THERAPEUTIC EXERCISES: CPT

## 2024-11-19 PROCEDURE — 84443 ASSAY THYROID STIM HORMONE: CPT | Performed by: FAMILY MEDICINE

## 2024-11-19 PROCEDURE — 97530 THERAPEUTIC ACTIVITIES: CPT

## 2024-11-19 PROCEDURE — 84100 ASSAY OF PHOSPHORUS: CPT | Performed by: INTERNAL MEDICINE

## 2024-11-19 PROCEDURE — 76775 US EXAM ABDO BACK WALL LIM: CPT

## 2024-11-19 PROCEDURE — 80061 LIPID PANEL: CPT | Performed by: FAMILY MEDICINE

## 2024-11-19 PROCEDURE — 85025 COMPLETE CBC W/AUTO DIFF WBC: CPT | Performed by: FAMILY MEDICINE

## 2024-11-19 PROCEDURE — 83036 HEMOGLOBIN GLYCOSYLATED A1C: CPT | Performed by: FAMILY MEDICINE

## 2024-11-19 PROCEDURE — 82306 VITAMIN D 25 HYDROXY: CPT | Performed by: INTERNAL MEDICINE

## 2024-11-19 PROCEDURE — 83735 ASSAY OF MAGNESIUM: CPT | Performed by: INTERNAL MEDICINE

## 2024-11-19 PROCEDURE — 83970 ASSAY OF PARATHORMONE: CPT | Performed by: INTERNAL MEDICINE

## 2024-11-19 PROCEDURE — 80048 BASIC METABOLIC PNL TOTAL CA: CPT | Performed by: FAMILY MEDICINE

## 2024-11-19 PROCEDURE — 82948 REAGENT STRIP/BLOOD GLUCOSE: CPT

## 2024-11-19 PROCEDURE — 25010000002 ERTAPENEM PER 500 MG: Performed by: FAMILY MEDICINE

## 2024-11-19 RX ORDER — SODIUM BICARBONATE 650 MG/1
650 TABLET ORAL 2 TIMES DAILY
Status: DISCONTINUED | OUTPATIENT
Start: 2024-11-19 | End: 2024-11-21

## 2024-11-19 RX ORDER — DOCUSATE SODIUM 100 MG/1
100 CAPSULE, LIQUID FILLED ORAL 2 TIMES DAILY
Status: DISCONTINUED | OUTPATIENT
Start: 2024-11-19 | End: 2024-12-01 | Stop reason: HOSPADM

## 2024-11-19 RX ADMIN — OXYCODONE HYDROCHLORIDE AND ACETAMINOPHEN 500 MG: 500 TABLET ORAL at 21:04

## 2024-11-19 RX ADMIN — OXYCODONE HYDROCHLORIDE AND ACETAMINOPHEN 1 TABLET: 5; 325 TABLET ORAL at 19:36

## 2024-11-19 RX ADMIN — LISINOPRIL 5 MG: 10 TABLET ORAL at 10:04

## 2024-11-19 RX ADMIN — DULOXETINE HYDROCHLORIDE 30 MG: 30 CAPSULE, DELAYED RELEASE ORAL at 10:05

## 2024-11-19 RX ADMIN — APIXABAN 5 MG: 5 TABLET, FILM COATED ORAL at 21:04

## 2024-11-19 RX ADMIN — SODIUM BICARBONATE 650 MG: 650 TABLET ORAL at 10:05

## 2024-11-19 RX ADMIN — DOCUSATE SODIUM 100 MG: 100 CAPSULE, LIQUID FILLED ORAL at 21:04

## 2024-11-19 RX ADMIN — TAMSULOSIN HYDROCHLORIDE 0.4 MG: 0.4 CAPSULE ORAL at 10:04

## 2024-11-19 RX ADMIN — Medication 1 TABLET: at 10:05

## 2024-11-19 RX ADMIN — OXYCODONE HYDROCHLORIDE AND ACETAMINOPHEN 500 MG: 500 TABLET ORAL at 10:05

## 2024-11-19 RX ADMIN — OXYCODONE HYDROCHLORIDE AND ACETAMINOPHEN 1 TABLET: 5; 325 TABLET ORAL at 10:05

## 2024-11-19 RX ADMIN — DOCUSATE SODIUM 100 MG: 100 CAPSULE, LIQUID FILLED ORAL at 15:41

## 2024-11-19 RX ADMIN — DOCUSATE SODIUM 50 MG AND SENNOSIDES 8.6 MG 1 TABLET: 8.6; 5 TABLET, FILM COATED ORAL at 21:04

## 2024-11-19 RX ADMIN — BUMETANIDE 2 MG: 1 TABLET ORAL at 21:04

## 2024-11-19 RX ADMIN — Medication 10 ML: at 10:05

## 2024-11-19 RX ADMIN — PREGABALIN 75 MG: 75 CAPSULE ORAL at 21:04

## 2024-11-19 RX ADMIN — ROSUVASTATIN 10 MG: 10 TABLET, FILM COATED ORAL at 10:04

## 2024-11-19 RX ADMIN — BUMETANIDE 2 MG: 1 TABLET ORAL at 10:04

## 2024-11-19 RX ADMIN — FAMOTIDINE 20 MG: 20 TABLET, FILM COATED ORAL at 10:05

## 2024-11-19 RX ADMIN — ZINC SULFATE 220 MG (50 MG) CAPSULE 220 MG: CAPSULE at 10:05

## 2024-11-19 RX ADMIN — DEXTROSE 15 G: 15 GEL ORAL at 00:40

## 2024-11-19 RX ADMIN — APIXABAN 5 MG: 5 TABLET, FILM COATED ORAL at 10:05

## 2024-11-19 RX ADMIN — ERTAPENEM 1000 MG: 1 INJECTION INTRAMUSCULAR; INTRAVENOUS at 15:41

## 2024-11-19 RX ADMIN — SODIUM BICARBONATE 650 MG: 650 TABLET ORAL at 21:04

## 2024-11-19 NOTE — PLAN OF CARE
Goal Outcome Evaluation:  Plan of Care Reviewed With: patient        Progress: no change  Outcome Evaluation: Pt resting well. C/o pain x1, see MAR. Voiding via urinal. Dressings applied to wounds per order. One episode of hypoglycemia, see nursing note. IVF infusing. Safety maintained.

## 2024-11-19 NOTE — CASE MANAGEMENT/SOCIAL WORK
Discharge Planning Assessment  [facility]     Patient Name: Erick Luong  MRN: [id number]  Today's Date: 11/19/2024    Admit Date: 11/17/2024        Discharge Needs Assessment       Row Name 11/19/24 0808       Living Environment    People in Home spouse    Name(s) of People in Home Joan Luong (Spouse)  [phone] (Mobile)    Current Living Arrangements home    Potentially Unsafe Housing Conditions none    In the past 12 months has the electric, gas, oil, or water company threatened to shut off services in your home? No    Primary Care Provided by self;spouse/significant other    Provides Primary Care For no one    Family Caregiver if Needed spouse    Family Caregiver Names Joan    Quality of Family Relationships helpful;involved;supportive       Resource/Environmental Concerns    Transportation Concerns none       Transportation Needs    In the past 12 months, has lack of transportation kept you from medical appointments or from getting medications? yes    In the past 12 months, has lack of transportation kept you from meetings, work, or from getting things needed for daily living? Yes       Food Insecurity    Within the past 12 months, you worried that your food would run out before you got the money to buy more. Never true    Within the past 12 months, the food you bought just didn't last and you didn't have money to get more. Never true       Transition Planning    Patient/Family Anticipates Transition to home with help/services    Patient/Family Anticipated Services at Transition home health care    Transportation Anticipated family or friend will provide       Discharge Needs Assessment    Equipment Currently Used at Home walker, standard    Concerns to be Addressed discharge planning    Do you want help finding or keeping work or a job? I do not need or want help    Do you want help with school or training? For example, starting or completing job training or getting a high school diploma, GED or  equivalent No    Anticipated Changes Related to Illness none    Equipment Needed After Discharge none    Discharge Coordination/Progress Patient lives at home with wife, Millicent.  Uses a standard walker as needed. Has medicare coverage and established primary care. PT has seen and states patient would benefit from continued PT at home. CM/SS will follow and be available to assist with any needed services, such as HH at time of discharge.                   Discharge Plan    No documentation.                 Continued Care and Services - Admitted Since 11/17/2024    No active coordination exists for this encounter.       Selected Continued Care - Episodes Includes continued care and service providers with selected services from the active episodes listed below      Rising Risk Care Management Episode start date: 11/19/2024   There are no active outsourced providers for this episode.                    Demographic Summary    No documentation.                  Functional Status    No documentation.                  Psychosocial    No documentation.                  Abuse/Neglect    No documentation.                  Legal    No documentation.                  Substance Abuse    No documentation.                  Patient Forms    No documentation.                     Stephanie Mccord RN

## 2024-11-19 NOTE — CONSULTS
Nephrology ([facility]) Consult Note      Patient:  Erick Luong  YOB: 1955  Date of Service: 11/18/2024  MRN: [id number]   Acct: [id number]   Primary Care Physician: Del Shetty MD  Advance Directive:   Code Status and Medical Interventions: CPR (Attempt to Resuscitate); Full Support   Ordered at: 11/18/24 0159     Code Status (Patient has no pulse and is not breathing):    CPR (Attempt to Resuscitate)     Medical Interventions (Patient has pulse or is breathing):    Full Support     Admit Date: 11/17/2024       Hospital Day: 0  Referring Provider: Garrett Alicia,*      Patient personally seen and examined.  Complete chart including Consults, Notes, Operative Reports, Labs, Cardiology, and Radiology studies reviewed as able.        Subjective:  Erick Luong is a 69 y.o. male for whom we were consulted for evaluation and treatment of acute kidney injury with history of chronic kidney disease. Baseline chronic kidney disease stage 3b. Baseline creatinine approximately 1.5-1.8. Follows in our office.  History of poorly controlled type 2 diabetes, hypertension, coronary artery disease, diabetic foot ulcer, obesity.  Patient was admitted with complaints of cystitis and failure to thrive along with foot wound.  He had had no dietary intake on the day of consult per review of notes.  He denies specific complaints upon questioning.  No evident peripheral edema.    Allergies:  Cefepime, Bactrim [sulfamethoxazole-trimethoprim], Vancomycin, Zolpidem, and Metronidazole    Home Meds:  Medications Prior to Admission   Medication Sig Dispense Refill Last Dose/Taking    apixaban (Eliquis) 5 MG tablet tablet Take 1 tablet by mouth 2 (Two) Times a Day. 60 tablet 0 Taking    ascorbic acid (VITAMIN C) 1000 MG tablet Take 1 tablet by mouth Daily. 30 tablet 3 Taking    bumetanide (BUMEX) 2 MG tablet Take 1 tablet by mouth 2 (Two) Times a Day. 6 tablet 3 Taking    busPIRone  (BUSPAR) 10 MG tablet Take 1 tablet by mouth 3 (Three) Times a Day As Needed. (Patient taking differently: Take 1 tablet by mouth 3 (Three) Times a Day As Needed (anxiety).) 270 tablet 4 Taking Differently    donepezil (ARICEPT) 10 MG tablet Take 1 tablet by mouth every night at bedtime. 30 tablet 0 Taking    DULoxetine (CYMBALTA) 60 MG capsule Take 1 capsule by mouth Daily. 30 capsule 0 Taking    famotidine (PEPCID) 20 MG tablet Take 1 tablet by mouth 2 (Two) Times a Day. 60 tablet 0 Taking    insulin glargine (Lantus) 100 UNIT/ML injection Inject 35 Units under the skin into the appropriate area as directed every night at bedtime. 10 mL 0 Taking    lisinopril (PRINIVIL,ZESTRIL) 5 MG tablet Take 1 tablet by mouth Daily. 30 tablet 0 Taking    melatonin 3 MG tablet Take 2 tablets by mouth At Night As Needed for Sleep. 30 tablet 0 Taking As Needed    midodrine (PROAMATINE) 2.5 MG tablet Take 1 tablet by mouth 2 (Two) Times a Day. 60 tablet 5 Taking    oxyCODONE (ROXICODONE) 10 MG tablet Take 1 tablet by mouth 2 (Two) Times a Day As Needed. (Patient taking differently: Take 1 tablet by mouth 2 (Two) Times a Day As Needed for Moderate Pain or Severe Pain. *must last 30 days*) 55 tablet 0 Taking Differently    pantoprazole (PROTONIX) 40 MG EC tablet Take 1 tablet by mouth 2 (Two) Times a Day. 60 tablet 0 Taking    polyethylene glycol (MIRALAX) 17 GM/SCOOP powder Take 17 g by mouth Daily As Needed (constipation).   Taking As Needed    potassium chloride ER (K-TAB) 20 MEQ tablet controlled-release ER tablet Take 1 tablet by mouth Daily. 30 tablet 0 Taking    rosuvastatin (CRESTOR) 10 MG tablet Take 1 tablet by mouth Daily. 30 tablet 0 Taking    sennosides-docusate (PERICOLACE) 8.6-50 MG per tablet Take 1 tablet by mouth Every Night. Obtain OTC   Taking    tamsulosin (FLOMAX) 0.4 MG capsule 24 hr capsule Take 1 capsule by mouth Daily. 90 capsule 4 Taking    Insulin Lispro (humaLOG) 100 UNIT/ML injection Inject 2-12 Units  under the skin into the appropriate area as directed 4 (Four) Times a Day Before Meals & at Bedtime. Inject subcutaneously four times a day per sliding scale:  0-150 = 0 units; 151-200 = 2u; 201-250 = 4u; 251-300 = 6u; 301-350 = 8u; 351-400 = 10u; 401+ = 12u       nitroglycerin (NITROSTAT) 0.4 MG SL tablet Place 1 tablet under the tongue Every 5 (Five) Minutes As Needed for Chest Pain. Take no more than 3 doses in 15 minutes.       pregabalin (LYRICA) 100 MG capsule Take 1 capsule by mouth Daily.       pregabalin (LYRICA) 100 MG capsule Take 2 capsules by mouth Every Night.       sodium hypochlorite (DAKIN'S 1/4 STRENGTH) 0.125 % solution topical solution 0.125% Apply  topically to the appropriate area as directed 2 (Two) Times a Day. 473 mL 5        Medicines:  Current Facility-Administered Medications   Medication Dose Route Frequency Provider Last Rate Last Admin    acetaminophen (TYLENOL) tablet 650 mg  650 mg Oral Q4H PRN Garrett Alicia MD        Or    acetaminophen (TYLENOL) 160 MG/5ML oral solution 650 mg  650 mg Oral Q4H PRN Garrett Alicia MD        Or    acetaminophen (TYLENOL) suppository 650 mg  650 mg Rectal Q4H PRN Garrett Alicia MD        apixaban (ELIQUIS) tablet 5 mg  5 mg Oral BID Garrett Alicia MD   5 mg at 11/18/24 0818    [START ON 11/19/2024] ascorbic acid (VITAMIN C) tablet 500 mg  500 mg Oral BID Telly Rodriguez MD        sennosides-docusate (PERICOLACE) 8.6-50 MG per tablet 2 tablet  2 tablet Oral BID PRN Garrett Alicia MD        And    polyethylene glycol (MIRALAX) packet 17 g  17 g Oral Daily PRN Garrett Alicia MD        And    bisacodyl (DULCOLAX) EC tablet 5 mg  5 mg Oral Daily PRN Garrett Alicia MD        And    bisacodyl (DULCOLAX) suppository 10 mg  10 mg Rectal Daily PRN Garrett Alicia MD        bumetanide (BUMEX) tablet 2 mg  2 mg Oral BID Garrett Alicia MD   2 mg at 11/18/24 0832    busPIRone  (BUSPAR) tablet 10 mg  10 mg Oral TID PRN Garrett Alicia MD        Calcium Replacement - Follow Nurse / BPA Driven Protocol   Not Applicable PRN Telly Rodriguez MD        dextrose (D50W) (25 g/50 mL) IV injection 25 g  25 g Intravenous Q15 Min PRN Garrett Alicia MD        dextrose (GLUTOSE) oral gel 15 g  15 g Oral Q15 Min PRN Garrett Alicia MD        [Held by provider] donepezil (ARICEPT) tablet 10 mg  10 mg Oral Nightly Garrett Alicia MD   10 mg at 11/18/24 0219    [START ON 11/19/2024] DULoxetine (CYMBALTA) DR capsule 30 mg  30 mg Oral Daily Telly Rodriguez MD        ertapenem (INVanz) 1,000 mg in sodium chloride 0.9 % 100 mL MBP  1,000 mg Intravenous Q24H Telly Rodriguez  mL/hr at 11/18/24 1620 1,000 mg at 11/18/24 1620    [START ON 11/19/2024] famotidine (PEPCID) tablet 20 mg  20 mg Oral Daily Telly Rodriguez MD        glucagon (GLUCAGEN) injection 1 mg  1 mg Intramuscular Q15 Min PRN Garrett Alicia MD        insulin glargine (LANTUS, SEMGLEE) injection 20 Units  20 Units Subcutaneous Nightly Garrett Alicia MD   20 Units at 11/18/24 0232    Insulin Lispro (humaLOG) injection 10 Units  10 Units Subcutaneous TID With Meals Garrett Alicia MD        Insulin Lispro (humaLOG) injection 2-9 Units  2-9 Units Subcutaneous 4x Daily AC & at Bedtime Garrett Alicia MD        lisinopril (PRINIVIL,ZESTRIL) tablet 5 mg  5 mg Oral Daily Garrett Alicia MD   5 mg at 11/18/24 0818    Magnesium Standard Dose Replacement - Follow Nurse / BPA Driven Protocol   Not Applicable PRN Telly Rodriguez MD        [START ON 11/19/2024] multivitamin with minerals 1 tablet  1 tablet Oral Daily Telly Rodriguez MD        nitroglycerin (NITROSTAT) SL tablet 0.4 mg  0.4 mg Sublingual Q5 Min PRN Garrett Alicia MD        ondansetron (ZOFRAN) injection 4 mg  4 mg Intravenous Q6H PRN Garrett Alicia MD   4 mg at 11/18/24 9557     oxyCODONE-acetaminophen (PERCOCET) 5-325 MG per tablet 1 tablet  1 tablet Oral Q4H PRN Telly Rodriguez MD   1 tablet at 11/18/24 1619    Phosphorus Replacement - Follow Nurse / BPA Driven Protocol   Not Applicable PRN Telly Rodriguez MD        potassium chloride (MICRO-K/KLOR-CON) CR capsule  40 mEq Oral Q4H Telly Rodriguez MD   40 mEq at 11/18/24 1619    Potassium Replacement - Follow Nurse / BPA Driven Protocol   Not Applicable PRN Telly Rodriguez MD        pregabalin (LYRICA) capsule 75 mg  75 mg Oral Nightly Telly Rodriguez MD        rosuvastatin (CRESTOR) tablet 10 mg  10 mg Oral Daily Garrett Alicia MD   10 mg at 11/18/24 0818    sennosides-docusate (PERICOLACE) 8.6-50 MG per tablet 1 tablet  1 tablet Oral Nightly Garrett Alicia MD        sodium bicarbonate tablet 650 mg  650 mg Oral TID Garrett Alicia MD   650 mg at 11/18/24 1619    sodium chloride 0.9 % flush 10 mL  10 mL Intravenous Q12H Garrett Alicia MD   10 mL at 11/18/24 0821    sodium chloride 0.9 % flush 10 mL  10 mL Intravenous PRN Garrett Alicia MD        sodium chloride 0.9 % infusion 40 mL  40 mL Intravenous PRN Garrett Alicia MD        tamsulosin (FLOMAX) 24 hr capsule 0.4 mg  0.4 mg Oral Daily Garrett Alicia MD   0.4 mg at 11/18/24 0818    [START ON 11/19/2024] zinc sulfate (ZINCATE) capsule 220 mg  220 mg Oral Daily Telly Rodriguez MD           Past Medical History:  Past Medical History:   Diagnosis Date    Arthritis     Autonomic disease     CAD (coronary artery disease) 02/06/2017    Cervical radiculopathy 09/16/2021    Chronic constipation with acute exaccerbation 05/10/2021    Coronary artery disease     Degeneration of cervical intervertebral disc 08/11/2021    Diabetes mellitus     Diabetic foot ulcer 08/31/2020    Diabetic polyneuropathy associated with type 2 diabetes mellitus 01/18/2021    Elevated cholesterol     Gastroesophageal reflux disease 05/13/2019     Headache     HTN (hypertension), benign 05/03/2017    Hyperlipidemia     Hypertension     Mixed hyperlipidemia 02/07/2017    Multiple lung nodules 01/26/2020    5mm, 9 mm RLL identified 1/2020, not present 10/2019.    Myocardial infarction     Osteomyelitis 01/22/2020    Osteomyelitis of fifth toe of right foot 10/07/2019    Pancreatitis     Persistent insomnia 01/20/2020    Renal disorder     Sleep apnea 02/06/2017    Sleep apnea with use of continuous positive airway pressure (CPAP)     NON-COMPLIANT    Slow transit constipation 01/16/2019    Spinal stenosis in cervical region 09/16/2021    Vitamin D deficiency 03/02/2021       Past Surgical History:  Past Surgical History:   Procedure Laterality Date    ABDOMINAL SURGERY      AMPUTATION FOOT / TOE Left 10/2021    5th digit     ANTERIOR CERVICAL DISCECTOMY W/ FUSION N/A 08/05/2022    Procedure: CERVICAL DISCECTOMY ANTERIOR WITH FUSION C5-6 with possible plating of C5-7 with neuromonitoring and 1 c-arm;  Surgeon: Karel Soliz MD;  Location: [facility] OR;  Service: Neurosurgery;  Laterality: N/A;    APPENDECTOMY      BACK SURGERY      CARDIAC CATHETERIZATION Left 02/08/2021    Procedure: Left Heart Cath w poss intervention left anatomical snuff box acess;  Surgeon: Omkar Charles DO;  Location: [facility] CATH INVASIVE LOCATION;  Service: Cardiology;  Laterality: Left;    CARDIAC SURGERY      CATARACT EXTRACTION      CERVICAL SPINE SURGERY      COLONOSCOPY N/A 01/31/2017    Normal exam repeat in 5 years    COLONOSCOPY N/A 02/11/2019    Mild acute inflammation    COLONOSCOPY N/A 04/07/2024    2 areas at 10 and 20 cm with friability ulceration 2 clips placed at 20 cm and 4 clips at 10 cm poor prep normal mucosa,mild eroisions and ulcerations in visible vessels    COLONOSCOPY N/A 7/1/2024    Procedure: COLONOSCOPY WITH ANESTHESIA;  Surgeon: Arsalan Lorenzo DO;  Location: [facility] ENDOSCOPY;  Service: Gastroenterology;  Laterality: N/A;  pre op  constipation/diarrhea  post poor prep  pcp Del Shetty    COLONOSCOPY N/A 2024    Procedure: COLONOSCOPY WITH ANESTHESIA;  Surgeon: Agapito Christopher MD;  Location: [facility] ENDOSCOPY;  Service: Gastroenterology;  Laterality: N/A;  pre rectal bleeding  post poor prep  pcp Del Shetty MD    COLONOSCOPY W/ POLYPECTOMY  2014    Hyperplastic polyp    CORONARY ARTERY BYPASS GRAFT  10/2015    ENDOSCOPY  2011    Gastritis with hemorrhage    ENDOSCOPY N/A 2017    Normal exam    ENDOSCOPY N/A 2019    Gastritis    ENDOSCOPY N/A 2020    Non-erosive gastritis with hemorrhage    ENDOSCOPY N/A 02/10/2021    Esophagitis    ENDOSCOPY N/A 2024    There were esophageal mucosal changes suspicious for short-segment Low's esophagus present in the distal esophagus. The maximum longitudinal extent of these mucosal changes was 2 cm in length. Mucosa was biopsied with a cold forceps for histologyDistal esophagus, biopsies: Mild chronic active esophagogastritis. No evidence of intestinal metaplasia, dysplasia. Antrum, bx, Mild chronic gastritis    FOOT SURGERY Left     INCISION AND DRAINAGE OF WOUND Left 2007    spider bite       Family History  Family History   Problem Relation Age of Onset    Colon cancer Father     Heart disease Father     Colon cancer Sister     Colon polyps Sister     Alzheimer's disease Mother     Coronary artery disease Sister     Coronary artery disease Sister        Social History  Social History     Socioeconomic History    Marital status:    Tobacco Use    Smoking status: Former     Current packs/day: 0.00     Types: Cigarettes     Quit date:      Years since quittin.9    Smokeless tobacco: Never    Tobacco comments:     smoked in highschool   Vaping Use    Vaping status: Never Used   Substance and Sexual Activity    Alcohol use: No    Drug use: No    Sexual activity: Defer         Review of Systems:  History obtained from chart review and the  "patient  General ROS: No fever or chills  Respiratory ROS: No cough, shortness of breath, wheezing  Cardiovascular ROS: No chest pain or palpitations  Gastrointestinal ROS: No abdominal pain or melena  Genito-Urinary ROS: No dysuria or hematuria  Psych ROS: No anxiety and depression  14 point ROS reviewed with the patient and negative except as noted above and in the HPI unless unable to obtain.      Objective:  Patient Vitals for the past 24 hrs:   BP Temp Temp src Pulse Resp SpO2 Height Weight   11/18/24 1458 108/61 98.1 °F (36.7 °C) Oral 74 16 99 % -- --   11/18/24 1123 128/61 98.1 °F (36.7 °C) Oral 81 16 95 % -- --   11/18/24 0842 123/69 98.6 °F (37 °C) Oral 58 16 98 % -- --   11/18/24 0246 106/84 97.5 °F (36.4 °C) Oral 59 20 99 % -- --   11/18/24 0028 125/58 98.5 °F (36.9 °C) Oral 74 16 99 % 182.9 cm (72\") 118 kg (259 lb 3.2 oz)   11/17/24 2038 134/63 99 °F (37.2 °C) Oral 91 22 100 % 182.9 cm (72\") 124 kg (273 lb 5.9 oz)       Intake/Output Summary (Last 24 hours) at 11/18/2024 1921  Last data filed at 11/18/2024 1853  Gross per 24 hour   Intake 1240 ml   Output 2050 ml   Net -810 ml     General: awake/alert   Chest:  clear to auscultation bilaterally without respiratory distress  CVS: IRRR  Abdominal: soft, nontender, positive bowel sounds  Extremities: no cyanosis or edema  Skin: warm and dry without rash      Labs:  Results from last 7 days   Lab Units 11/18/24  0416 11/17/24 2153   WBC 10*3/mm3 9.37 10.64   HEMOGLOBIN g/dL 9.7* 10.2*   HEMATOCRIT % 29.3* 30.5*   PLATELETS 10*3/mm3 242 255         Results from last 7 days   Lab Units 11/18/24 0416 11/17/24 2153   SODIUM mmol/L 140 138   POTASSIUM mmol/L 3.5 4.4   CHLORIDE mmol/L 108* 106   CO2 mmol/L 16.0* 19.0*   BUN mg/dL 49* 53*   CREATININE mg/dL 2.63* 2.83*   CALCIUM mg/dL 8.6 8.6   EGFR mL/min/1.73 25.7* 23.5*   BILIRUBIN mg/dL  --  0.3   ALK PHOS U/L  --  134*   ALT (SGPT) U/L  --  19   AST (SGOT) U/L  --  13   GLUCOSE mg/dL 106* 348* " "      Radiology:   Imaging Results (Last 72 Hours)       Procedure Component Value Units Date/Time    XR Chest 1 View [[id number]] Collected: 11/17/24 2138     Updated: 11/17/24 2143    Narrative:      XR CHEST 1 VW- 11/17/2024 8:33 PM     HISTORY: cough       COMPARISON: 10/20/2024     FINDINGS:  Upright frontal radiograph of the chest was obtained     No lung consolidation. No pleural effusion or pneumothorax. Prior  coronary bypass. Heart size is stable. Pulmonary vasculature are  nondilated. The osseous structures and surrounding soft tissues  demonstrate no acute abnormality.       Impression:      1.  Stable chest exam without acute process. No visualized acute  infiltrate.  2.  Previous coronary bypass.     This report was signed and finalized on 11/17/2024 9:39 PM by Dr King Ceballos.               Culture:  No results found for: \"BLOODCX\", \"URINECX\", \"WOUNDCX\", \"MRSACX\", \"RESPCX\", \"STOOLCX\"      Assessment   Acute kidney injury  Chronic kidney disease stage IIIb  Hypertension  Diabetes type 2  Anemia chronic kidney disease  Obesity  Metabolic acidosis  Acute cystitis     Plan:  Discussed with patient, nursing  Workup reviewed today  Monitor labs  Antibiotics per primary service  IV fluids given patient's lack of oral intake and acute kidney injury, continue to monitor further needs daily        Thank you for the consult, we appreciate the opportunity to provide care to your patients.  Feel free to contact me if I can be of any further assistance.      Willy Arteaga MD  11/18/2024  19:21 CST    "

## 2024-11-19 NOTE — NURSING NOTE
Pt called out stating that he felt like his blood sugar was low. This nurse went to check and obtained a reading of 54 at 0034 and then, again, 54 at 0036 the recheck. Pt was provided with glucose gel, orange juice with sugar, and pj crackers. Recheck BG at 15 mins was 75 and pt stated that he was feeling better.

## 2024-11-19 NOTE — PROGRESS NOTES
[facility] Medicine Services  INPATIENT PROGRESS NOTE    Patient Name: Erick Luong  Date of Admission: 11/17/2024  Today's Date: 11/19/24  Length of Stay: 0  Primary Care Physician: Del Shetty MD    Subjective   Chief Complaint: UTI/failure to thrive/foot wound.   HPI   Blood pressure stable, afebrile.  Waiting for urine culture come back to see what it is.  Ultrasound result the kidney discussed with patient.  Metabolic acidosis improving.  Creatinine is stable.  Uncontrolled diabetes with hemoglobin A1c 13.4.  Hemoglobin stable.    Review of Systems   Constitutional:  Positive for activity change, appetite change and fatigue. Negative for chills and fever.   HENT:  Negative for hearing loss, nosebleeds, tinnitus and trouble swallowing.    Eyes:  Negative for visual disturbance.   Respiratory:  Negative for cough, chest tightness, shortness of breath and wheezing.    Cardiovascular:  Negative for chest pain, palpitations and leg swelling.   Gastrointestinal:  Negative for abdominal distention, abdominal pain, blood in stool, constipation, diarrhea, nausea and vomiting.   Endocrine: Negative for cold intolerance, heat intolerance, polydipsia, polyphagia and polyuria.   Genitourinary:  Negative for decreased urine volume, difficulty urinating, dysuria, flank pain, frequency and hematuria.   Musculoskeletal:  Positive for arthralgias, gait problem and myalgias. Negative for joint swelling.   Skin:  Negative for rash.   Allergic/Immunologic: Negative for immunocompromised state.   Neurological:  Positive for weakness. Negative for dizziness, syncope, light-headedness and headaches.   Hematological:  Negative for adenopathy. Does not bruise/bleed easily.   Psychiatric/Behavioral:  Negative for confusion and sleep disturbance. The patient is not nervous/anxious.    All pertinent negatives and positives are as above. All other systems have been reviewed and are negative unless  otherwise stated.     Objective    Temp:  [97.5 °F (36.4 °C)-98.4 °F (36.9 °C)] 97.5 °F (36.4 °C)  Heart Rate:  [65-82] 82  Resp:  [16] 16  BP: (108-138)/(61-86) 126/66  Physical Exam  Vitals and nursing note reviewed.   Constitutional:       Comments: Chronically ill.   HENT:      Head: Normocephalic.   Eyes:      Conjunctiva/sclera: Conjunctivae normal.      Pupils: Pupils are equal, round, and reactive to light.   Cardiovascular:      Rate and Rhythm: Heart rates in 80s.. Rhythm irregular.      Heart sounds: Normal heart sounds.   Pulmonary:      Effort: No respiratory distress.   Abdominal:      General: Bowel sounds are normal. There is no distension.      Palpations: Abdomen is soft.      Tenderness: There is no abdominal tenderness.      Comments: Obesity.   Musculoskeletal:         General: No swelling.      Cervical back: Neck supple.   Skin:     General: Skin is warm and dry.      Capillary Refill: Capillary refill takes 2 to 3 seconds.      Findings: No rash.      Comments: Left foot wound, dressing in place.   Neurological:      Mental Status: He is alert and oriented to person, place, and time.      Motor: Weakness present.      Coordination: Coordination abnormal.      Gait: Gait abnormal.   Psychiatric:         Mood and Affect: Mood normal.         Behavior: Behavior normal.         Thought Content: Thought content normal.          Results Review:  I have reviewed the labs, radiology results, and diagnostic studies.    Laboratory Data:   Results from last 7 days   Lab Units 11/19/24 0412 11/18/24 0416 11/17/24 2153   WBC 10*3/mm3 7.37 9.37 10.64   HEMOGLOBIN g/dL 9.8* 9.7* 10.2*   HEMATOCRIT % 31.0* 29.3* 30.5*   PLATELETS 10*3/mm3 233 242 255        Results from last 7 days   Lab Units 11/19/24 0412 11/18/24 0416 11/17/24 2153   SODIUM mmol/L 142 140 138   POTASSIUM mmol/L 5.0 3.5 4.4   CHLORIDE mmol/L 111* 108* 106   CO2 mmol/L 21.0* 16.0* 19.0*   BUN mg/dL 45* 49* 53*   CREATININE mg/dL 2.60*  2.63* 2.83*   CALCIUM mg/dL 8.5* 8.6 8.6   BILIRUBIN mg/dL  --   --  0.3   ALK PHOS U/L  --   --  134*   ALT (SGPT) U/L  --   --  19   AST (SGOT) U/L  --   --  13   GLUCOSE mg/dL 148* 106* 348*       Culture Data:   Blood Culture   Date Value Ref Range Status   11/18/2024 No growth at 24 hours  Preliminary   11/17/2024 No growth at 24 hours  Preliminary     Urine Culture   Date Value Ref Range Status   11/17/2024 >100,000 CFU/mL Gram Negative Bacilli (A)  Preliminary       Radiology Data:   Imaging Results (Last 24 Hours)       Procedure Component Value Units Date/Time    US Renal Bilateral [[id number]] Collected: 11/19/24 0838     Updated: 11/19/24 0843    Narrative:      US RENAL BILATERAL-     HISTORY: Acute on chronic renal failure; N39.0-Urinary tract infection,  site not specified; N17.9-Acute kidney failure, unspecified;  N18.9-Chronic kidney disease, unspecified; R73.9-Hyperglycemia,  unspecified; Z74.09-Other reduced mobility     COMPARISON: 8/1/2024     FINDINGS: Sonographic imaging in the kidneys and bladder performed.     The visible abdominal aorta normal in caliber, diminished visualization  of portions of the aorta due to overlying shadowing bowel gas.     Bladder is distended and appears normal.     Kidneys are normal in echotexture. The right kidney measures 9.8 x 5.8 x  5.4 cm. There is an exophytic right mid 4.5 cm renal cyst. The left  kidney measures 11.2 x 5.9 x 7.5 cm. No left renal cyst. No  solid-appearing renal mass. No obstructing intrarenal stones. No  hydronephrosis. No abnormal perinephric fluid collection.       Impression:      1. Simple 4.5 cm right mid renal cyst. Otherwise normal sonographic  appearance of the kidneys.  2. Distended normal-appearing bladder.        This report was signed and finalized on 11/19/2024 8:40 AM by Dr. Yuliana Calhoun MD.               I have reviewed the patient's current medications.     Assessment/Plan   Assessment  Active Hospital Problems     Diagnosis     **Acute UTI (urinary tract infection)     Acute encephalopathy     Type 2 diabetes mellitus with hyperglycemia, with long-term current use of insulin     Atrial fibrillation     Chronic heart failure with preserved ejection fraction (HFpEF)     Stage 3b chronic kidney disease     CAD (coronary artery disease)     BPH without obstruction/lower urinary tract symptoms     Diabetic ulcer of left foot associated with type 2 diabetes mellitus     Sleep apnea with use of continuous positive airway pressure (CPAP)     Essential hypertension        HPI . 69-year-old male with past medical history of coronary artery disease, CABG, Afib, diabetes mellitus insulin-dependent, diabetic foot ulcer, hypertension, sleep apnea noncompliant with CPAP, right seventh rib fracture on 10/20/2024, presents emergency room with complaints of mental status changes disorientation suprapubic pain and burning with urination.  He appears ill and debilitated, although he is afebrile.  Blood work shows normal white blood cell count UA is consistent with UTI.  He has had a recent UTI with ESBL.      Treatment Plan     UTI . UTI with history of ESBL.  Normal saline 50 cc an hour.  Invanz antibiotics.     Acute encephalopathy due to UTI.  Patient is oriented x 3.     Recent rib fracture and chronic pain . 7 right ribs fracture 10/20/2024.  Percocet as needed.  Start Colace.     Diabetes mellitus type 2 insulin-dependent with hyperglycemia.  Hemoglobin A1c 13.4.  Insulin regular IV given in the ER will recheck  Continue Lantus 20 units nightly  Humalog 10 units Premeal 3 times daily and Humalog moderate dose sliding scale ACHS  Hypoglycemia protocol . Uncontrolled diabetes constipation.  This to be a chronic management through his primary care physician.     Diabetic foot ulcers left foot.  Patient follow-up with wound care outpatient Dr. Mireya Hobbs, per patient.  Consult wound care.  See wound image in media section of chart, the  wound appears clean with good margins.  Continue local care and podiatry follow-up outpatient     Atrial fibrillation/CAD/chronic diastolic congestive heart failure/CABG/hypertension/hyperlipidemia.  Continue Eliquis 5 mg p.o. twice daily.  Bumex twice daily.  Lisinopril . Crestor.  Coreg was discontinued during the last hospitalization in August he does not list any other rate controlling medications.  Echocardiogram 4/2/2024-61 - 65%, mild concentric hypertrophy, left ventricular diastolic function is consistent with (grade III w/high LAP), Mildly reduced right ventricular systolic function, left atrial cavity is mildly dilated, mild calcification of the aortic valve, No aortic valve regurgitation is present, No hemodynamically significant aortic valve stenosis is present.     Acute on chronic stage IIIb renal failure/metabolic acidosis.  Metabolic acidosis improving.  Nephrology consult.  Baseline creatinine 8/4/2024 1.6.  Bicarb p.o. Creatinine stable.  Ultrasound the kidneys-Simple 4.5 cm right mid renal cyst- Otherwise normal sonographic  appearance of the kidneys, Distended normal-appearing bladder.     Obstructive sleep apnea patient noncompliant with CPAP  Patient is on room air.     DVT prophylaxis patient anticoagulated     Dementia.  Hold Aricept.     Neuropathy.  Lyrica nightly.     Constipation.  Sarah-Colace.     Nausea . Zofran as needed.     Depression/anxiety.  Cut back Cymbalta.    BuSpar as needed.     Prostate hypertrophy.  Flomax.     Nutrition.  Cardiac/diabetic diet.  Vitamin C.     Deconditioning.  Fall precaution.  PT OT consult.     Blood culture-no growth for 24 hours..  Urine culture-gram-negative bacilli.  Respiratory panel-negative.     Medical Decision Making  Number and Complexity of problems: UTI/altered mental//diabetes/CAD/left foot wound  Differential Diagnosis: None     Conditions and Status        Condition is unchanged.     [facility] Data  External documents reviewed: None.  Cardiac  tracing (EKG, telemetry) interpretation: Irregular, rates 59  Radiology interpretation: Echo  Labs reviewed: Laboratory  Any tests that were considered but not ordered: Lab in a.m.     Decision rules/scores evaluated (example [id number], Wells, etc): None     Discussed with: Patient     Care Planning  Shared decision making: Patient  Code status and discussions: Full code.     Disposition  Social Determinants of Health that impact treatment or disposition: From home.  1 to 5 days      Electronically signed by Telly Rodriguez MD, 11/19/24, 14:07 CST.

## 2024-11-19 NOTE — THERAPY TREATMENT NOTE
Acute Care - Physical Therapy Treatment Note  [facility]     Patient Name: Erick Luong  : 1955  MRN: [id number]  Today's Date: 2024      Visit Dx:     ICD-10-CM ICD-9-CM   1. Acute UTI (urinary tract infection)  N39.0 599.0   2. Acute renal failure superimposed on chronic kidney disease, unspecified acute renal failure type, unspecified CKD stage  N17.9 584.9    N18.9 585.9   3. Acute hyperglycemia  R73.9 790.29   4. Impaired functional mobility and activity tolerance [Z74.09]  Z74.09 V49.89     Patient Active Problem List   Diagnosis    Obesity, unspecified obesity severity, unspecified obesity type    Essential hypertension    Type 2 diabetes mellitus with hyperglycemia, with long-term current use of insulin    Nonsmoker    Anemia due to chronic kidney disease    Class 3 severe obesity due to excess calories with body mass index (BMI) of 40.0 to 44.9 in adult    Anasarca    Sleep apnea with use of continuous positive airway pressure (CPAP)    Medically noncompliant    Diabetic ulcer of left foot associated with type 2 diabetes mellitus    Diabetic polyneuropathy associated with type 2 diabetes mellitus    Spinal stenosis in cervical region    Degeneration of cervical intervertebral disc    Cervical radiculopathy    Degeneration of lumbar or lumbosacral intervertebral disc    Cervical myelopathy    Bilateral carpal tunnel syndrome    CAD (coronary artery disease)    GERD without esophagitis    BPH without obstruction/lower urinary tract symptoms    Stage 3b chronic kidney disease    Chronic diastolic heart failure    Type 2 myocardial infarction due to heart failure    Left carpal tunnel syndrome    Syncope and collapse, recurrent episodes    Poorly-controlled hypertension    Rhinovirus    Peripheral vascular disease    Chronic kidney disease (CKD), stage IV (severe)    Diabetic foot infection    Sepsis    Epigastric pain    Chronic heart failure with preserved ejection fraction (HFpEF)    Sepsis  due to methicillin resistant Staphylococcus aureus (MRSA) with encephalopathy without septic shock    Slow transit constipation    CHF exacerbation    CHF (congestive heart failure), NYHA class I, acute on chronic, combined    Rectal bleeding    Acute on chronic heart failure with preserved ejection fraction (HFpEF)    DKA, type 2, not at goal    Atrial fibrillation    E. coli UTI, ESBL    Acute UTI (urinary tract infection)    Acute encephalopathy    Type 2 diabetes mellitus with hyperglycemia, with long-term current use of insulin     Past Medical History:   Diagnosis Date    Arthritis     Autonomic disease     CAD (coronary artery disease) 02/06/2017    Cervical radiculopathy 09/16/2021    Chronic constipation with acute exaccerbation 05/10/2021    Coronary artery disease     Degeneration of cervical intervertebral disc 08/11/2021    Diabetes mellitus     Diabetic foot ulcer 08/31/2020    Diabetic polyneuropathy associated with type 2 diabetes mellitus 01/18/2021    Elevated cholesterol     Gastroesophageal reflux disease 05/13/2019    Headache     HTN (hypertension), benign 05/03/2017    Hyperlipidemia     Hypertension     Mixed hyperlipidemia 02/07/2017    Multiple lung nodules 01/26/2020    5mm, 9 mm RLL identified 1/2020, not present 10/2019.    Myocardial infarction     Osteomyelitis 01/22/2020    Osteomyelitis of fifth toe of right foot 10/07/2019    Pancreatitis     Persistent insomnia 01/20/2020    Renal disorder     Sleep apnea 02/06/2017    Sleep apnea with use of continuous positive airway pressure (CPAP)     NON-COMPLIANT    Slow transit constipation 01/16/2019    Spinal stenosis in cervical region 09/16/2021    Vitamin D deficiency 03/02/2021     Past Surgical History:   Procedure Laterality Date    ABDOMINAL SURGERY      AMPUTATION FOOT / TOE Left 10/2021    5th digit     ANTERIOR CERVICAL DISCECTOMY W/ FUSION N/A 08/05/2022    Procedure: CERVICAL DISCECTOMY ANTERIOR WITH FUSION C5-6 with possible  plating of C5-7 with neuromonitoring and 1 c-arm;  Surgeon: Karel Soliz MD;  Location: [facility] OR;  Service: Neurosurgery;  Laterality: N/A;    APPENDECTOMY      BACK SURGERY      CARDIAC CATHETERIZATION Left 02/08/2021    Procedure: Left Heart Cath w poss intervention left anatomical snuff box acess;  Surgeon: Omkar Charles DO;  Location: [facility] CATH INVASIVE LOCATION;  Service: Cardiology;  Laterality: Left;    CARDIAC SURGERY      CATARACT EXTRACTION      CERVICAL SPINE SURGERY      COLONOSCOPY N/A 01/31/2017    Normal exam repeat in 5 years    COLONOSCOPY N/A 02/11/2019    Mild acute inflammation    COLONOSCOPY N/A 04/07/2024    2 areas at 10 and 20 cm with friability ulceration 2 clips placed at 20 cm and 4 clips at 10 cm poor prep normal mucosa,mild eroisions and ulcerations in visible vessels    COLONOSCOPY N/A 7/1/2024    Procedure: COLONOSCOPY WITH ANESTHESIA;  Surgeon: Arsalan Lorenzo DO;  Location: [facility] ENDOSCOPY;  Service: Gastroenterology;  Laterality: N/A;  pre op constipation/diarrhea  post poor prep  pcp Del Shetty    COLONOSCOPY N/A 7/2/2024    Procedure: COLONOSCOPY WITH ANESTHESIA;  Surgeon: Agapito Christopher MD;  Location: [facility] ENDOSCOPY;  Service: Gastroenterology;  Laterality: N/A;  pre rectal bleeding  post poor prep  pcp Del Shetty MD    COLONOSCOPY W/ POLYPECTOMY  03/04/2014    Hyperplastic polyp    CORONARY ARTERY BYPASS GRAFT  10/2015    ENDOSCOPY  04/13/2011    Gastritis with hemorrhage    ENDOSCOPY N/A 05/05/2017    Normal exam    ENDOSCOPY N/A 02/11/2019    Gastritis    ENDOSCOPY N/A 09/01/2020    Non-erosive gastritis with hemorrhage    ENDOSCOPY N/A 02/10/2021    Esophagitis    ENDOSCOPY N/A 04/11/2024    There were esophageal mucosal changes suspicious for short-segment Low's esophagus present in the distal esophagus. The maximum longitudinal extent of these mucosal changes was 2 cm in length. Mucosa was biopsied with a cold forceps for  histologyDistal esophagus, biopsies: Mild chronic active esophagogastritis. No evidence of intestinal metaplasia, dysplasia. Antrum, bx, Mild chronic gastritis    FOOT SURGERY Left     INCISION AND DRAINAGE OF WOUND Left 09/2007    spider bite     PT Assessment (Last 12 Hours)       PT Evaluation and Treatment       Row Name 11/19/24 0981          Physical Therapy Time and Intention    Subjective Information no complaints  -     Document Type therapy note (daily note)  -     Mode of Treatment physical therapy  -JACIEL     Comment wife unable to bring boot due to being sick.  Attempted to have pt maintain NWB or keep weight on toes   -JACIEL       Row Name 11/19/24 0958          General Information    Existing Precautions/Restrictions fall  wound R heel  -JACIEL       Row Name 11/19/24 0958          Pain    Pretreatment Pain Rating 5/10  -JACIEL     Posttreatment Pain Rating 5/10  -JACIEL     Pain Location abdomen  -     Pain Side/Orientation generalized  -     Pain Management Interventions exercise or physical activity utilized  -     Response to Pain Interventions activity participation with tolerable pain  -       Row Name 11/19/24 0965          Bed Mobility    Supine-Sit [place] (Bed Mobility) standby assist  -     Sit-Supine [place] (Bed Mobility) standby assist  -       Row Name 11/19/24 0923          Transfers    Transfers sit-stand transfer;stand-sit transfer;bed-chair transfer  -       Row Name 11/19/24 0958          Bed-Chair Transfer    Bed-Chair [place] (Transfers) verbal cues;contact guard  -     Assistive Device (Bed-Chair Transfers) walker, front-wheeled  -       Row Name 11/19/24 0977          Sit-Stand Transfer    Sit-Stand [place] (Transfers) standby assist  -     Assistive Device (Sit-Stand Transfers) walker, front-wheeled  -       Row Name 11/19/24 0958          Stand-Sit Transfer    Stand-Sit [place] (Transfers) standby assist  -     Assistive Device (Stand-Sit  Transfers) walker, front-wheeled  -JACIEL       Row Name 11/19/24 0958          Gait/Stairs (Locomotion)    Comment, (Gait/Stairs) did not amb due to still not having boot for RLE  -JACIEL       Row Name 11/19/24 0958          Motor Skills    Comments, Therapeutic Exercise in folwers, AROM BLE X 20  -JACIEL     Additional Documentation Comments, Therapeutic Exercise (Row)  -JACIEL       Row Name             Wound 08/22/23 0140 Left posterior plantar    Wound - Properties Group Placement Date: 08/22/23  -AM Placement Time: 0140  -AM Side: Left  -AM Orientation: posterior  -AM Location: plantar  -AM Present on Original Admission: Y  -AM    Retired Wound - Properties Group Placement Date: 08/22/23  -AM Placement Time: 0140  -AM Present on Original Admission: Y  -AM Side: Left  -AM Orientation: posterior  -AM Location: plantar  -AM    Retired Wound - Properties Group Placement Date: 08/22/23  -AM Placement Time: 0140  -AM Present on Original Admission: Y  -AM Side: Left  -AM Orientation: posterior  -AM Location: plantar  -AM    Retired Wound - Properties Group Date first assessed: 08/22/23  -AM Time first assessed: 0140  -AM Present on Original Admission: Y  -AM Side: Left  -AM Location: plantar  -AM      Row Name             Wound 06/15/23 0102 Left anterior plantar    Wound - Properties Group Placement Date: 06/15/23  -SM Placement Time: 0102  -SM Side: Left  -SM Orientation: anterior  -SM Location: plantar  -SM Removal Date: --  -JR Removal Time: --  -JR    Retired Wound - Properties Group Placement Date: 06/15/23  -SM Placement Time: 0102  -SM Side: Left  -SM Orientation: anterior  -SM Location: plantar  -SM Removal Date: --  -JR Removal Time: --  -JR    Retired Wound - Properties Group Placement Date: 06/15/23  -SM Placement Time: 0102  -SM Side: Left  -SM Orientation: anterior  -SM Location: plantar  -SM Removal Date: --  -JR Removal Time: --  -JR    Retired Wound - Properties Group Date first assessed: 06/15/23  -SM Time first  assessed: 0102  -SM Side: Left  -SM Location: plantar  -SM Resolution Date: --  -JR Resolution Time: --  -JR      Row Name             Wound 11/18/24 0046 Left lower arm skin tear;abrasion    Wound - Properties Group Placement Date: 11/18/24  -JR Placement Time: 0046 -JR Side: Left  -JR Orientation: lower  -JR Location: arm  -JR Primary Wound Type: Abrasion  -JR Type: skin tear;abrasion  -JR Present on Original Admission: Y  -JR    Retired Wound - Properties Group Placement Date: 11/18/24  -JR Placement Time: 0046 -JR Present on Original Admission: Y  -JR Side: Left  -JR Orientation: lower  -JR Location: arm  -JR Primary Wound Type: Abrasion  -JR Type: skin tear;abrasion  -JR    Retired Wound - Properties Group Placement Date: 11/18/24  -JR Placement Time: 0046 -JR Present on Original Admission: Y  -JR Side: Left  -JR Orientation: lower  -JR Location: arm  -JR Primary Wound Type: Abrasion  -JR Type: skin tear;abrasion  -JR    Retired Wound - Properties Group Date first assessed: 11/18/24  -JR Time first assessed: 0046 -JR Present on Original Admission: Y  -JR Side: Left  -JR Location: arm  -JR Primary Wound Type: Abrasion  -JR Type: skin tear;abrasion  -JR      Row Name             Wound 11/18/24 0056 Left medial foot diabetic ulcer    Wound - Properties Group Placement Date: 11/18/24  -JR Placement Time: 0056 -JR Side: Left  -JR Orientation: medial  -JR Location: foot  -JR Primary Wound Type: Diabetic ulc  -JR Type: diabetic ulcer  -JR Present on Original Admission: Y  -JR    Retired Wound - Properties Group Placement Date: 11/18/24  -JR Placement Time: 0056 -JR Present on Original Admission: Y  -JR Side: Left  -JR Orientation: medial  -JR Location: foot  -JR Primary Wound Type: Diabetic ulc  -JR Type: diabetic ulcer  -JR    Retired Wound - Properties Group Placement Date: 11/18/24  -JR Placement Time: 0056 -JR Present on Original Admission: Y  -JR Side: Left  -JR Orientation: medial  -JR Location: foot  -JR  Primary Wound Type: Diabetic ulc  -JR Type: diabetic ulcer  -JR    Retired Wound - Properties Group Date first assessed: 11/18/24  -JR Time first assessed: 0056 -JR Present on Original Admission: Y  -JR Side: Left  -JR Location: foot  -JR Primary Wound Type: Diabetic ulc  -JR Type: diabetic ulcer  -JR      Row Name 11/19/24 0958          Positioning and Restraints    Pre-Treatment Position in bed  -JACIEL     Post Treatment Position chair  -JACIEL     In Chair notified nsg;reclined;call light within reach;encouraged to call for assist  -JACIEL               User Key  (r) = Recorded By, (t) = Taken By, (c) = Cosigned By      Initials Name Provider Type    Sabas Ervin, PARUL Physical Therapist Assistant    Faviola Kunz, RN Registered Nurse    Michelle Diaz, RN Registered Nurse    Sabas Young LPNA Licensed Nurse    Sabas Young, RN Registered Nurse                    Physical Therapy Education       Title: PT OT SLP Therapies (In Progress)       Topic: Physical Therapy (Not Started)       Point: Mobility training (Done)       Learning Progress Summary            Patient Acceptance, E, VU by JACIEL at 11/19/2024 0958    Comment: limited gait until boot for RLE    Acceptance, E,TB,D, VU,NR by  at 11/18/2024 1128    Comment: education re: purpose of PT/importance of activity, safety/falls prevention, NWB L LE due to open wounds, improved tech w/ tfers, positioning w/ L LE elevated                      Point: Home exercise program (Not Started)       Learner Progress:  Not documented in this visit.              Point: Precautions (Done)       Learning Progress Summary            Patient Acceptance, E,TB,D, VU,NR by NIKO at 11/18/2024 1128    Comment: education re: purpose of PT/importance of activity, safety/falls prevention, NWB L LE due to open wounds, improved tech w/ tfers, positioning w/ L LE elevated                                      User Key       Initials Effective Dates Name Provider Type  Discipline     02/03/23 -  Sabas Maharaj PTA Physical Therapist Assistant PT     08/02/18 -  Melly Mustafa PT Physical Therapist PT                  PT Recommendation and Plan     Progress: no change  Outcome Evaluation: Pt was in bed, agreed to therapy.  C/o abdomen pain.  Able to perform bed mobility with SBA.  Transfered sit to stand with CGA.  Pt transfered bed to chair with RWX and trying to keep weight off of R foot.  Did not amb due to pt still not having boot for RLE.   Outcome Measures       Row Name 11/19/24 0958             How much help from another person do you currently need...    Turning from your back to your side while in flat bed without using bedrails? 4  -JACIEL      Moving from lying on back to sitting on the side of a flat bed without bedrails? 4  -JACIEL      Moving to and from a bed to a chair (including a wheelchair)? 3  -JACIEL      Standing up from a chair using your arms (e.g., wheelchair, bedside chair)? 3  -JACIEL      Climbing 3-5 steps with a railing? 1  -JACIEL      To walk in hospital room? 2  -JACIEL      AM-PAC 6 Clicks Score (PT) 17  -JACIEL         Functional Assessment    Outcome Measure Options AM-PAC 6 Clicks Basic Mobility (PT)  -JACIEL                User Key  (r) = Recorded By, (t) = Taken By, (c) = Cosigned By      Initials Name Provider Type    JACIEL Sabas Maharaj PTA Physical Therapist Assistant                     Time Calculation:    PT Charges       Row Name 11/19/24 0958             Time Calculation    Start Time 0958  -JACIEL      Stop Time 1021  -JACIEL      Time Calculation (min) 23 min  -JACIEL      PT Received On 11/19/24  -JACIEL         Time Calculation- PT    Total Timed Code Minutes- PT 23 minute(s)  -JACIEL         Timed Charges    28760 - PT Therapeutic Exercise Minutes 13  -JACIEL      40915 - PT Therapeutic Activity Minutes 10  -JACIEL         Total Minutes    Timed Charges Total Minutes 23  -JACIEL       Total Minutes 23  -JACIEL                User Key  (r) = Recorded By, (t) = Taken By, (c) = Cosigned  By      Initials Name Provider Type    Sabas Ervin PTA Physical Therapist Assistant                  Therapy Charges for Today       Code Description Service Date Service Provider Modifiers Qty    20048684241 HC PT THERAPEUTIC ACT EA 15 MIN 11/19/2024 Sabas Maharaj, PARUL GP 1    80905095060 HC PT THER PROC EA 15 MIN 11/19/2024 Sabas Maharaj PTA GP 1            PT G-Codes  Outcome Measure Options: AM-PAC 6 Clicks Basic Mobility (PT)  AM-PAC 6 Clicks Score (PT): 17    Sabas Maharaj PTA  11/19/2024

## 2024-11-19 NOTE — PLAN OF CARE
Goal Outcome Evaluation:  Plan of Care Reviewed With: patient        Progress: no change  Outcome Evaluation: Pt was in bed, agreed to therapy.  C/o abdomen pain.  Able to perform bed mobility with SBA.  Transfered sit to stand with CGA.  Pt transfered bed to chair with RWX and trying to keep weight off of R foot.  Did not amb due to pt still not having boot for RLE.

## 2024-11-20 LAB
ANION GAP SERPL CALCULATED.3IONS-SCNC: 16 MMOL/L (ref 5–15)
BUN SERPL-MCNC: 43 MG/DL (ref 8–23)
BUN/CREAT SERPL: 17.6 (ref 7–25)
CALCIUM SPEC-SCNC: 8.6 MG/DL (ref 8.6–10.5)
CHLORIDE SERPL-SCNC: 106 MMOL/L (ref 98–107)
CO2 SERPL-SCNC: 19 MMOL/L (ref 22–29)
CREAT SERPL-MCNC: 2.44 MG/DL (ref 0.76–1.27)
DEPRECATED RDW RBC AUTO: 52 FL (ref 37–54)
EGFRCR SERPLBLD CKD-EPI 2021: 28.1 ML/MIN/1.73
ERYTHROCYTE [DISTWIDTH] IN BLOOD BY AUTOMATED COUNT: 15.9 % (ref 12.3–15.4)
GLUCOSE BLDC GLUCOMTR-MCNC: 105 MG/DL (ref 70–130)
GLUCOSE BLDC GLUCOMTR-MCNC: 126 MG/DL (ref 70–130)
GLUCOSE BLDC GLUCOMTR-MCNC: 235 MG/DL (ref 70–130)
GLUCOSE BLDC GLUCOMTR-MCNC: 88 MG/DL (ref 70–130)
GLUCOSE SERPL-MCNC: 115 MG/DL (ref 65–99)
HCT VFR BLD AUTO: 33.9 % (ref 37.5–51)
HGB BLD-MCNC: 10.6 G/DL (ref 13–17.7)
MCH RBC QN AUTO: 27.5 PG (ref 26.6–33)
MCHC RBC AUTO-ENTMCNC: 31.3 G/DL (ref 31.5–35.7)
MCV RBC AUTO: 88.1 FL (ref 79–97)
PLATELET # BLD AUTO: 256 10*3/MM3 (ref 140–450)
PMV BLD AUTO: 11.8 FL (ref 6–12)
POTASSIUM SERPL-SCNC: 4.6 MMOL/L (ref 3.5–5.2)
RBC # BLD AUTO: 3.85 10*6/MM3 (ref 4.14–5.8)
SODIUM SERPL-SCNC: 141 MMOL/L (ref 136–145)
WBC NRBC COR # BLD AUTO: 5.94 10*3/MM3 (ref 3.4–10.8)

## 2024-11-20 PROCEDURE — 25810000003 SODIUM CHLORIDE 0.9 % SOLUTION: Performed by: INTERNAL MEDICINE

## 2024-11-20 PROCEDURE — 85027 COMPLETE CBC AUTOMATED: CPT | Performed by: FAMILY MEDICINE

## 2024-11-20 PROCEDURE — 25010000002 ONDANSETRON PER 1 MG: Performed by: FAMILY MEDICINE

## 2024-11-20 PROCEDURE — 97530 THERAPEUTIC ACTIVITIES: CPT

## 2024-11-20 PROCEDURE — 25010000002 PROCHLORPERAZINE 10 MG/2ML SOLUTION: Performed by: FAMILY MEDICINE

## 2024-11-20 PROCEDURE — 63710000001 INSULIN LISPRO (HUMAN) PER 5 UNITS: Performed by: FAMILY MEDICINE

## 2024-11-20 PROCEDURE — 25010000002 ERTAPENEM PER 500 MG: Performed by: FAMILY MEDICINE

## 2024-11-20 PROCEDURE — 80048 BASIC METABOLIC PNL TOTAL CA: CPT | Performed by: FAMILY MEDICINE

## 2024-11-20 PROCEDURE — 82948 REAGENT STRIP/BLOOD GLUCOSE: CPT

## 2024-11-20 PROCEDURE — 97166 OT EVAL MOD COMPLEX 45 MIN: CPT

## 2024-11-20 RX ORDER — PROCHLORPERAZINE EDISYLATE 5 MG/ML
5 INJECTION INTRAMUSCULAR; INTRAVENOUS EVERY 6 HOURS PRN
Status: DISCONTINUED | OUTPATIENT
Start: 2024-11-20 | End: 2024-12-01 | Stop reason: HOSPADM

## 2024-11-20 RX ORDER — FAMOTIDINE 10 MG/ML
20 INJECTION, SOLUTION INTRAVENOUS EVERY 12 HOURS SCHEDULED
Status: DISCONTINUED | OUTPATIENT
Start: 2024-11-21 | End: 2024-11-21

## 2024-11-20 RX ORDER — CALCIUM CARBONATE 500 MG/1
2 TABLET, CHEWABLE ORAL 3 TIMES DAILY PRN
Status: DISCONTINUED | OUTPATIENT
Start: 2024-11-20 | End: 2024-11-29

## 2024-11-20 RX ORDER — BUMETANIDE 1 MG/1
1 TABLET ORAL
Status: DISCONTINUED | OUTPATIENT
Start: 2024-11-20 | End: 2024-12-01 | Stop reason: HOSPADM

## 2024-11-20 RX ORDER — FAMOTIDINE 10 MG/ML
20 INJECTION, SOLUTION INTRAVENOUS DAILY
Status: DISCONTINUED | OUTPATIENT
Start: 2024-11-21 | End: 2024-11-20

## 2024-11-20 RX ADMIN — Medication 1 TABLET: at 08:37

## 2024-11-20 RX ADMIN — DULOXETINE HYDROCHLORIDE 30 MG: 30 CAPSULE, DELAYED RELEASE ORAL at 08:37

## 2024-11-20 RX ADMIN — BUMETANIDE 2 MG: 1 TABLET ORAL at 08:37

## 2024-11-20 RX ADMIN — BUSPIRONE HYDROCHLORIDE 10 MG: 10 TABLET ORAL at 20:55

## 2024-11-20 RX ADMIN — INSULIN LISPRO 10 UNITS: 100 INJECTION, SOLUTION INTRAVENOUS; SUBCUTANEOUS at 17:36

## 2024-11-20 RX ADMIN — PREGABALIN 75 MG: 75 CAPSULE ORAL at 20:56

## 2024-11-20 RX ADMIN — BUMETANIDE 1 MG: 1 TABLET ORAL at 17:36

## 2024-11-20 RX ADMIN — LISINOPRIL 5 MG: 10 TABLET ORAL at 08:36

## 2024-11-20 RX ADMIN — OXYCODONE HYDROCHLORIDE AND ACETAMINOPHEN 1 TABLET: 5; 325 TABLET ORAL at 08:47

## 2024-11-20 RX ADMIN — ZINC SULFATE 220 MG (50 MG) CAPSULE 220 MG: CAPSULE at 08:36

## 2024-11-20 RX ADMIN — DOCUSATE SODIUM 100 MG: 100 CAPSULE, LIQUID FILLED ORAL at 08:47

## 2024-11-20 RX ADMIN — FAMOTIDINE 20 MG: 20 TABLET, FILM COATED ORAL at 08:37

## 2024-11-20 RX ADMIN — OXYCODONE HYDROCHLORIDE AND ACETAMINOPHEN 500 MG: 500 TABLET ORAL at 08:37

## 2024-11-20 RX ADMIN — ONDANSETRON 4 MG: 2 INJECTION INTRAMUSCULAR; INTRAVENOUS at 09:15

## 2024-11-20 RX ADMIN — Medication 10 ML: at 20:56

## 2024-11-20 RX ADMIN — SODIUM CHLORIDE 100 ML/HR: 900 INJECTION, SOLUTION INTRAVENOUS at 02:10

## 2024-11-20 RX ADMIN — TAMSULOSIN HYDROCHLORIDE 0.4 MG: 0.4 CAPSULE ORAL at 08:37

## 2024-11-20 RX ADMIN — OXYCODONE HYDROCHLORIDE AND ACETAMINOPHEN 1 TABLET: 5; 325 TABLET ORAL at 20:56

## 2024-11-20 RX ADMIN — APIXABAN 5 MG: 5 TABLET, FILM COATED ORAL at 08:37

## 2024-11-20 RX ADMIN — ANTACID TABLETS 2 TABLET: 500 TABLET, CHEWABLE ORAL at 14:59

## 2024-11-20 RX ADMIN — ROSUVASTATIN 10 MG: 10 TABLET, FILM COATED ORAL at 08:37

## 2024-11-20 RX ADMIN — SODIUM BICARBONATE 650 MG: 650 TABLET ORAL at 20:56

## 2024-11-20 RX ADMIN — DOCUSATE SODIUM 50 MG AND SENNOSIDES 8.6 MG 1 TABLET: 8.6; 5 TABLET, FILM COATED ORAL at 20:56

## 2024-11-20 RX ADMIN — DOCUSATE SODIUM 100 MG: 100 CAPSULE, LIQUID FILLED ORAL at 20:56

## 2024-11-20 RX ADMIN — Medication 10 ML: at 08:37

## 2024-11-20 RX ADMIN — SODIUM BICARBONATE 650 MG: 650 TABLET ORAL at 08:37

## 2024-11-20 RX ADMIN — APIXABAN 5 MG: 5 TABLET, FILM COATED ORAL at 20:55

## 2024-11-20 RX ADMIN — OXYCODONE HYDROCHLORIDE AND ACETAMINOPHEN 1 TABLET: 5; 325 TABLET ORAL at 16:37

## 2024-11-20 RX ADMIN — PROCHLORPERAZINE EDISYLATE 5 MG: 5 INJECTION INTRAMUSCULAR; INTRAVENOUS at 14:56

## 2024-11-20 RX ADMIN — ERTAPENEM 1000 MG: 1 INJECTION INTRAMUSCULAR; INTRAVENOUS at 14:36

## 2024-11-20 NOTE — PLAN OF CARE
Goal Outcome Evaluation:  Plan of Care Reviewed With: patient        Progress: no change  Outcome Evaluation: Pt resting well. C/o pain x1, see MAR. Accucheck, no SSI or lantus per pt request due to hypoglycemic episode previous night shift. Voiding via urinal. Daily dressing changes to left foot and left arm per order. Safety maintained.

## 2024-11-20 NOTE — PLAN OF CARE
Goal Outcome Evaluation:  Plan of Care Reviewed With: patient        Progress: no change  Outcome Evaluation: OT eval completed. Pt in fowlers upon therapist arrival; A&Ox4; c/o 6/10 abdominal pain. Pt reports Mod I-I with all BADLs including fxl ambulation at baseline. Pt reports that he is supposed to wear a CAM boot on LLE when standing/ambulating, however, he does not have it at this time. Pt reports that his wife should be able to bring it today. OT assumed NWB on LLE without boot for eval purposes. Pt performed supine>sit utilizing bedrail with HOB elevated with SBA. Pt adjusted B socks while seated EOB with SBA.  Pt performed sit<>stand and took a couple steps from bed>chair utilizing rwx with CGA and verbal cues for safety awareness and to maintain NWB on LLE. Despite cueing, Pt able to maintain TTWB, but unable to maintain NWB on LLE during transfer this date. BUE strength WFL. Skilled OT intervention indicated in order to address deficits in fxl mobility, fxl activity tolerance, balance, strength, and use of adaptive techniques/equipment during performance of BADLs. Anticipate Pt to return home with assist and HH at discharge.    Anticipated Discharge Disposition (OT): home with assist, home with home health

## 2024-11-20 NOTE — PROGRESS NOTES
Nephrology ([facility]) Progress Note      Patient:  Erick Luong  YOB: 1955  Date of Service: 11/20/2024  MRN: [id number]   Acct: [id number]   Primary Care Physician: Del Shetty MD  Advance Directive:   Code Status and Medical Interventions: CPR (Attempt to Resuscitate); Full Support   Ordered at: 11/18/24 0159     Code Status (Patient has no pulse and is not breathing):    CPR (Attempt to Resuscitate)     Medical Interventions (Patient has pulse or is breathing):    Full Support     Admit Date: 11/17/2024       Hospital Day: 1  Referring Provider: Garrett Alicia,*      Patient personally seen and examined.  Complete chart including Consults, Notes, Operative Reports, Labs, Cardiology, and Radiology studies reviewed as able.        Subjective:  Erick Luong is a 69 y.o. male for whom we were consulted for evaluation and treatment of acute kidney injury with history of chronic kidney disease. Baseline chronic kidney disease stage 3b. Baseline creatinine approximately 1.5-1.8. Follows in our office.  History of poorly controlled type 2 diabetes, hypertension, coronary artery disease, diabetic foot ulcer, obesity.  Patient was admitted with complaints of cystitis and failure to thrive along with foot wound.  He had had no dietary intake on the day of consult per review of notes.  He denies specific complaints upon questioning.  No evident peripheral edema.     Today, no overnight events.  Seen with nursing.  Family present.  Patient denied complaints upon questioning.  Up in chair.  Developed some minimal peripheral edema    Allergies:  Cefepime, Bactrim [sulfamethoxazole-trimethoprim], Vancomycin, Zolpidem, and Metronidazole    Home Meds:  Medications Prior to Admission   Medication Sig Dispense Refill Last Dose/Taking    apixaban (Eliquis) 5 MG tablet tablet Take 1 tablet by mouth 2 (Two) Times a Day. 60 tablet 0 Taking    ascorbic acid (VITAMIN C) 1000 MG  tablet Take 1 tablet by mouth Daily. 30 tablet 3 Taking    bumetanide (BUMEX) 2 MG tablet Take 1 tablet by mouth 2 (Two) Times a Day. 6 tablet 3 Taking    busPIRone (BUSPAR) 10 MG tablet Take 1 tablet by mouth 3 (Three) Times a Day As Needed. (Patient taking differently: Take 1 tablet by mouth 3 (Three) Times a Day As Needed (anxiety).) 270 tablet 4 Taking Differently    donepezil (ARICEPT) 10 MG tablet Take 1 tablet by mouth every night at bedtime. 30 tablet 0 Taking    DULoxetine (CYMBALTA) 60 MG capsule Take 1 capsule by mouth Daily. 30 capsule 0 Taking    famotidine (PEPCID) 20 MG tablet Take 1 tablet by mouth 2 (Two) Times a Day. 60 tablet 0 Taking    insulin glargine (Lantus) 100 UNIT/ML injection Inject 35 Units under the skin into the appropriate area as directed every night at bedtime. 10 mL 0 Taking    lisinopril (PRINIVIL,ZESTRIL) 5 MG tablet Take 1 tablet by mouth Daily. 30 tablet 0 Taking    melatonin 3 MG tablet Take 2 tablets by mouth At Night As Needed for Sleep. 30 tablet 0 Taking As Needed    midodrine (PROAMATINE) 2.5 MG tablet Take 1 tablet by mouth 2 (Two) Times a Day. 60 tablet 5 Taking    oxyCODONE (ROXICODONE) 10 MG tablet Take 1 tablet by mouth 2 (Two) Times a Day As Needed. (Patient taking differently: Take 1 tablet by mouth 2 (Two) Times a Day As Needed for Moderate Pain or Severe Pain. *must last 30 days*) 55 tablet 0 Taking Differently    pantoprazole (PROTONIX) 40 MG EC tablet Take 1 tablet by mouth 2 (Two) Times a Day. 60 tablet 0 Taking    polyethylene glycol (MIRALAX) 17 GM/SCOOP powder Take 17 g by mouth Daily As Needed (constipation).   Taking As Needed    potassium chloride ER (K-TAB) 20 MEQ tablet controlled-release ER tablet Take 1 tablet by mouth Daily. 30 tablet 0 Taking    rosuvastatin (CRESTOR) 10 MG tablet Take 1 tablet by mouth Daily. 30 tablet 0 Taking    sennosides-docusate (PERICOLACE) 8.6-50 MG per tablet Take 1 tablet by mouth Every Night. Obtain OTC   Taking     tamsulosin (FLOMAX) 0.4 MG capsule 24 hr capsule Take 1 capsule by mouth Daily. 90 capsule 4 Taking    Insulin Lispro (humaLOG) 100 UNIT/ML injection Inject 2-12 Units under the skin into the appropriate area as directed 4 (Four) Times a Day Before Meals & at Bedtime. Inject subcutaneously four times a day per sliding scale:  0-150 = 0 units; 151-200 = 2u; 201-250 = 4u; 251-300 = 6u; 301-350 = 8u; 351-400 = 10u; 401+ = 12u       nitroglycerin (NITROSTAT) 0.4 MG SL tablet Place 1 tablet under the tongue Every 5 (Five) Minutes As Needed for Chest Pain. Take no more than 3 doses in 15 minutes.       pregabalin (LYRICA) 100 MG capsule Take 1 capsule by mouth Daily.       pregabalin (LYRICA) 100 MG capsule Take 2 capsules by mouth Every Night.       sodium hypochlorite (DAKIN'S 1/4 STRENGTH) 0.125 % solution topical solution 0.125% Apply  topically to the appropriate area as directed 2 (Two) Times a Day. 473 mL 5        Medicines:  Current Facility-Administered Medications   Medication Dose Route Frequency Provider Last Rate Last Admin    acetaminophen (TYLENOL) tablet 650 mg  650 mg Oral Q4H PRN Garrett Alicia MD        Or    acetaminophen (TYLENOL) 160 MG/5ML oral solution 650 mg  650 mg Oral Q4H PRN Garrett Alicia MD        Or    acetaminophen (TYLENOL) suppository 650 mg  650 mg Rectal Q4H PRN Garrett Alicia MD        apixaban (ELIQUIS) tablet 5 mg  5 mg Oral BID Garrett Alicia MD   5 mg at 11/20/24 0837    [Held by provider] ascorbic acid (VITAMIN C) tablet 500 mg  500 mg Oral BID Telly Rodriguez MD   500 mg at 11/20/24 0837    sennosides-docusate (PERICOLACE) 8.6-50 MG per tablet 2 tablet  2 tablet Oral BID PRN Garrett Alicia MD        And    polyethylene glycol (MIRALAX) packet 17 g  17 g Oral Daily PRN Garrett Alicia MD        And    bisacodyl (DULCOLAX) EC tablet 5 mg  5 mg Oral Daily PRN Garrett Alicia MD        And    bisacodyl (DULCOLAX)  suppository 10 mg  10 mg Rectal Daily PRN Garrett Alicia MD        bumetanide (BUMEX) tablet 1 mg  1 mg Oral BID Telly Rodriguez MD        busPIRone (BUSPAR) tablet 10 mg  10 mg Oral TID PRN Garrett Alicia MD        calcium carbonate (TUMS) chewable tablet 500 mg (200 mg elemental)  2 tablet Oral TID PRN Telly Rodriguez MD   2 tablet at 11/20/24 1459    Calcium Replacement - Follow Nurse / BPA Driven Protocol   Not Applicable PRN Telly Rodriguez MD        dextrose (D50W) (25 g/50 mL) IV injection 25 g  25 g Intravenous Q15 Min PRN Garrett Alicia MD        dextrose (GLUTOSE) oral gel 15 g  15 g Oral Q15 Min PRN Garrett Alicia MD   15 g at 11/19/24 0040    docusate sodium (COLACE) capsule 100 mg  100 mg Oral BID Telly Rodriguez MD   100 mg at 11/20/24 0847    [Held by provider] donepezil (ARICEPT) tablet 10 mg  10 mg Oral Nightly Garrett Alicia MD   10 mg at 11/18/24 0219    DULoxetine (CYMBALTA) DR capsule 30 mg  30 mg Oral Daily Telly Rodriguez MD   30 mg at 11/20/24 0837    ertapenem (INVanz) 1,000 mg in sodium chloride 0.9 % 100 mL MBP  1,000 mg Intravenous Q24H Telly Rodriguez  mL/hr at 11/20/24 1436 1,000 mg at 11/20/24 1436    [START ON 11/21/2024] famotidine (PEPCID) injection 20 mg  20 mg Intravenous Q12H Telly Rodriguez MD        glucagon (GLUCAGEN) injection 1 mg  1 mg Intramuscular Q15 Min PRN Garrett Alicia MD        insulin glargine (LANTUS, SEMGLEE) injection 20 Units  20 Units Subcutaneous Nightly Garrett Alicia MD   20 Units at 11/18/24 2143    Insulin Lispro (humaLOG) injection 10 Units  10 Units Subcutaneous TID With Meals Garrett Alicia MD        Insulin Lispro (humaLOG) injection 2-9 Units  2-9 Units Subcutaneous 4x Daily AC & at Bedtime Garrett Alicia MD   2 Units at 11/18/24 6632    lisinopril (PRINIVIL,ZESTRIL) tablet 5 mg  5 mg Oral Daily Garrett Alicia MD   5 mg at 11/20/24 1581     Magnesium Standard Dose Replacement - Follow Nurse / BPA Driven Protocol   Not Applicable PRN Telly Rodriguez MD        multivitamin with minerals 1 tablet  1 tablet Oral Daily Telly Rodriguez MD   1 tablet at 11/20/24 0837    nitroglycerin (NITROSTAT) SL tablet 0.4 mg  0.4 mg Sublingual Q5 Min PRN Garrett Alicia MD        ondansetron (ZOFRAN) injection 4 mg  4 mg Intravenous Q6H PRN Garrett Alicia MD   4 mg at 11/20/24 0915    oxyCODONE-acetaminophen (PERCOCET) 5-325 MG per tablet 1 tablet  1 tablet Oral Q4H PRN Telly Rodriguez MD   1 tablet at 11/20/24 1637    Phosphorus Replacement - Follow Nurse / BPA Driven Protocol   Not Applicable PRN Telly Rodriguez MD        Potassium Replacement - Follow Nurse / BPA Driven Protocol   Not Applicable PRN Telly Rodriguez MD        pregabalin (LYRICA) capsule 75 mg  75 mg Oral Nightly Telly Rodriguez MD   75 mg at 11/19/24 2104    prochlorperazine (COMPAZINE) injection 5 mg  5 mg Intravenous Q6H PRN Telly Rodriguez MD   5 mg at 11/20/24 1456    rosuvastatin (CRESTOR) tablet 10 mg  10 mg Oral Daily Garrett Alicia MD   10 mg at 11/20/24 0837    sennosides-docusate (PERICOLACE) 8.6-50 MG per tablet 1 tablet  1 tablet Oral Nightly Garrett Alicia MD   1 tablet at 11/19/24 2104    sodium bicarbonate tablet 650 mg  650 mg Oral BID Telly Rodriguez MD   650 mg at 11/20/24 0837    sodium chloride 0.9 % flush 10 mL  10 mL Intravenous Q12H Garrett Alicia MD   10 mL at 11/20/24 0837    sodium chloride 0.9 % flush 10 mL  10 mL Intravenous PRN Garrett Alicia MD        sodium chloride 0.9 % infusion 40 mL  40 mL Intravenous PRN Garrett Alicia MD        sodium chloride 0.9 % infusion  100 mL/hr Intravenous Continuous Willy Arteaga  mL/hr at 11/20/24 0210 100 mL/hr at 11/20/24 0210    tamsulosin (FLOMAX) 24 hr capsule 0.4 mg  0.4 mg Oral Daily Garrett Alicia MD   0.4 mg at 11/20/24 0837    [Held  by provider] zinc sulfate (ZINCATE) capsule 220 mg  220 mg Oral Daily Telly Rodriguez MD   220 mg at 11/20/24 0836       Past Medical History:  Past Medical History:   Diagnosis Date    Arthritis     Autonomic disease     CAD (coronary artery disease) 02/06/2017    Cervical radiculopathy 09/16/2021    Chronic constipation with acute exaccerbation 05/10/2021    Coronary artery disease     Degeneration of cervical intervertebral disc 08/11/2021    Diabetes mellitus     Diabetic foot ulcer 08/31/2020    Diabetic polyneuropathy associated with type 2 diabetes mellitus 01/18/2021    Elevated cholesterol     Gastroesophageal reflux disease 05/13/2019    Headache     HTN (hypertension), benign 05/03/2017    Hyperlipidemia     Hypertension     Mixed hyperlipidemia 02/07/2017    Multiple lung nodules 01/26/2020    5mm, 9 mm RLL identified 1/2020, not present 10/2019.    Myocardial infarction     Osteomyelitis 01/22/2020    Osteomyelitis of fifth toe of right foot 10/07/2019    Pancreatitis     Persistent insomnia 01/20/2020    Renal disorder     Sleep apnea 02/06/2017    Sleep apnea with use of continuous positive airway pressure (CPAP)     NON-COMPLIANT    Slow transit constipation 01/16/2019    Spinal stenosis in cervical region 09/16/2021    Vitamin D deficiency 03/02/2021       Past Surgical History:  Past Surgical History:   Procedure Laterality Date    ABDOMINAL SURGERY      AMPUTATION FOOT / TOE Left 10/2021    5th digit     ANTERIOR CERVICAL DISCECTOMY W/ FUSION N/A 08/05/2022    Procedure: CERVICAL DISCECTOMY ANTERIOR WITH FUSION C5-6 with possible plating of C5-7 with neuromonitoring and 1 c-arm;  Surgeon: Karel Soliz MD;  Location:  [facility] OR;  Service: Neurosurgery;  Laterality: N/A;    APPENDECTOMY      BACK SURGERY      CARDIAC CATHETERIZATION Left 02/08/2021    Procedure: Left Heart Cath w poss intervention left anatomical snuff box acess;  Surgeon: Omkar Charles DO;  Location:  [facility] CATH  INVASIVE LOCATION;  Service: Cardiology;  Laterality: Left;    CARDIAC SURGERY      CATARACT EXTRACTION      CERVICAL SPINE SURGERY      COLONOSCOPY N/A 01/31/2017    Normal exam repeat in 5 years    COLONOSCOPY N/A 02/11/2019    Mild acute inflammation    COLONOSCOPY N/A 04/07/2024    2 areas at 10 and 20 cm with friability ulceration 2 clips placed at 20 cm and 4 clips at 10 cm poor prep normal mucosa,mild eroisions and ulcerations in visible vessels    COLONOSCOPY N/A 7/1/2024    Procedure: COLONOSCOPY WITH ANESTHESIA;  Surgeon: Arsalan Lorenzo DO;  Location: [facility] ENDOSCOPY;  Service: Gastroenterology;  Laterality: N/A;  pre op constipation/diarrhea  post poor prep  pcp Del Shetty    COLONOSCOPY N/A 7/2/2024    Procedure: COLONOSCOPY WITH ANESTHESIA;  Surgeon: Agapito Christopher MD;  Location: [facility] ENDOSCOPY;  Service: Gastroenterology;  Laterality: N/A;  pre rectal bleeding  post poor prep  pcp Del Shetty MD    COLONOSCOPY W/ POLYPECTOMY  03/04/2014    Hyperplastic polyp    CORONARY ARTERY BYPASS GRAFT  10/2015    ENDOSCOPY  04/13/2011    Gastritis with hemorrhage    ENDOSCOPY N/A 05/05/2017    Normal exam    ENDOSCOPY N/A 02/11/2019    Gastritis    ENDOSCOPY N/A 09/01/2020    Non-erosive gastritis with hemorrhage    ENDOSCOPY N/A 02/10/2021    Esophagitis    ENDOSCOPY N/A 04/11/2024    There were esophageal mucosal changes suspicious for short-segment Low's esophagus present in the distal esophagus. The maximum longitudinal extent of these mucosal changes was 2 cm in length. Mucosa was biopsied with a cold forceps for histologyDistal esophagus, biopsies: Mild chronic active esophagogastritis. No evidence of intestinal metaplasia, dysplasia. Antrum, bx, Mild chronic gastritis    FOOT SURGERY Left     INCISION AND DRAINAGE OF WOUND Left 09/2007    spider bite       Family History  Family History   Problem Relation Age of Onset    Colon cancer Father     Heart disease Father     Colon cancer  Sister     Colon polyps Sister     Alzheimer's disease Mother     Coronary artery disease Sister     Coronary artery disease Sister        Social History  Social History     Socioeconomic History    Marital status:    Tobacco Use    Smoking status: Former     Current packs/day: 0.00     Types: Cigarettes     Quit date:      Years since quittin.9    Smokeless tobacco: Never    Tobacco comments:     smoked in highschool   Vaping Use    Vaping status: Never Used   Substance and Sexual Activity    Alcohol use: No    Drug use: No    Sexual activity: Defer       Review of Systems:  History obtained from chart review and the patient  General ROS: No fever or chills  Respiratory ROS: No cough, shortness of breath, wheezing  Cardiovascular ROS: No chest pain or palpitations  Gastrointestinal ROS: No abdominal pain or melena  Genito-Urinary ROS: No dysuria or hematuria  Psych ROS: No anxiety and depression  14 point ROS reviewed with the patient and negative except as noted above and in the HPI unless unable to obtain.    Objective:  Patient Vitals for the past 24 hrs:   BP Temp Temp src Pulse Resp SpO2   24 1632 151/69 97.4 °F (36.3 °C) Oral 67 16 100 %   24 1212 108/55 97.3 °F (36.3 °C) Oral 62 16 100 %   24 0819 135/65 97.5 °F (36.4 °C) Oral 62 16 98 %   24 0421 118/65 97.6 °F (36.4 °C) Oral 67 16 97 %   24 1924 119/63 98.6 °F (37 °C) Oral 75 16 98 %   24 1707 118/58 97.8 °F (36.6 °C) Oral 67 16 100 %       Intake/Output Summary (Last 24 hours) at 2024 1647  Last data filed at 2024 1330  Gross per 24 hour   Intake 1700 ml   Output 3100 ml   Net -1400 ml     General: awake/alert   Chest:  clear to auscultation bilaterally without respiratory distress  CVS: regular rate and rhythm  Abdominal: soft, nontender, positive bowel sounds  Extremities: ble edema  Skin: warm and dry without rash      Labs:  Results from last 7 days   Lab Units 24  3859  11/19/24 0412 11/18/24 0416   WBC 10*3/mm3 5.94 7.37 9.37   HEMOGLOBIN g/dL 10.6* 9.8* 9.7*   HEMATOCRIT % 33.9* 31.0* 29.3*   PLATELETS 10*3/mm3 256 233 242         Results from last 7 days   Lab Units 11/20/24  0606 11/19/24 0412 11/18/24 0416 11/17/24  2153   SODIUM mmol/L 141 142 140 138   POTASSIUM mmol/L 4.6 5.0 3.5 4.4   CHLORIDE mmol/L 106 111* 108* 106   CO2 mmol/L 19.0* 21.0* 16.0* 19.0*   BUN mg/dL 43* 45* 49* 53*   CREATININE mg/dL 2.44* 2.60* 2.63* 2.83*   CALCIUM mg/dL 8.6 8.5* 8.6 8.6   EGFR mL/min/1.73 28.1* 26.0* 25.7* 23.5*   BILIRUBIN mg/dL  --   --   --  0.3   ALK PHOS U/L  --   --   --  134*   ALT (SGPT) U/L  --   --   --  19   AST (SGOT) U/L  --   --   --  13   GLUCOSE mg/dL 115* 148* 106* 348*       Radiology:   Imaging Results (Last 72 Hours)       Procedure Component Value Units Date/Time    US Renal Bilateral [[id number]] Collected: 11/19/24 0838     Updated: 11/19/24 0843    Narrative:      US RENAL BILATERAL-     HISTORY: Acute on chronic renal failure; N39.0-Urinary tract infection,  site not specified; N17.9-Acute kidney failure, unspecified;  N18.9-Chronic kidney disease, unspecified; R73.9-Hyperglycemia,  unspecified; Z74.09-Other reduced mobility     COMPARISON: 8/1/2024     FINDINGS: Sonographic imaging in the kidneys and bladder performed.     The visible abdominal aorta normal in caliber, diminished visualization  of portions of the aorta due to overlying shadowing bowel gas.     Bladder is distended and appears normal.     Kidneys are normal in echotexture. The right kidney measures 9.8 x 5.8 x  5.4 cm. There is an exophytic right mid 4.5 cm renal cyst. The left  kidney measures 11.2 x 5.9 x 7.5 cm. No left renal cyst. No  solid-appearing renal mass. No obstructing intrarenal stones. No  hydronephrosis. No abnormal perinephric fluid collection.       Impression:      1. Simple 4.5 cm right mid renal cyst. Otherwise normal sonographic  appearance of the kidneys.  2. Distended  normal-appearing bladder.        This report was signed and finalized on 11/19/2024 8:40 AM by Dr. Yuliana Calhoun MD.       XR Chest 1 View [[id number]] Collected: 11/17/24 2138     Updated: 11/17/24 2143    Narrative:      XR CHEST 1 VW- 11/17/2024 8:33 PM     HISTORY: cough       COMPARISON: 10/20/2024     FINDINGS:  Upright frontal radiograph of the chest was obtained     No lung consolidation. No pleural effusion or pneumothorax. Prior  coronary bypass. Heart size is stable. Pulmonary vasculature are  nondilated. The osseous structures and surrounding soft tissues  demonstrate no acute abnormality.       Impression:      1.  Stable chest exam without acute process. No visualized acute  infiltrate.  2.  Previous coronary bypass.     This report was signed and finalized on 11/17/2024 9:39 PM by Dr King Ceballos.               Culture:  Blood Culture   Date Value Ref Range Status   11/18/2024 No growth at 24 hours  Preliminary   11/17/2024 No growth at 24 hours  Preliminary     Urine Culture   Date Value Ref Range Status   11/17/2024 >100,000 CFU/mL Gram Negative Bacilli (A)  Preliminary         Assessment   Acute kidney injury  Chronic kidney disease stage IIIb  Hypertension  Diabetes type 2-uncontrolled by A1c 13.4  Anemia chronic kidney disease  Obesity  Metabolic acidosis  Acute cystitis     Plan:  Discussed with patient, nursing, family  Workup reviewed today  Monitor labs  Antibiotics per primary service  IV fluids given patient's lack of oral intake and acute kidney injury, continue to monitor further needs daily and given continued lack of intake will continue for today        Willy Arteaga MD  11/20/2024  16:47 CST

## 2024-11-20 NOTE — THERAPY TREATMENT NOTE
Acute Care - Physical Therapy Treatment Note  [facility]     Patient Name: Erick Luong  : 1955  MRN: [id number]  Today's Date: 2024      Visit Dx:     ICD-10-CM ICD-9-CM   1. Acute UTI (urinary tract infection)  N39.0 599.0   2. Acute renal failure superimposed on chronic kidney disease, unspecified acute renal failure type, unspecified CKD stage  N17.9 584.9    N18.9 585.9   3. Acute hyperglycemia  R73.9 790.29   4. Impaired functional mobility and activity tolerance [Z74.09]  Z74.09 V49.89     Patient Active Problem List   Diagnosis    Obesity, unspecified obesity severity, unspecified obesity type    Essential hypertension    Type 2 diabetes mellitus with hyperglycemia, with long-term current use of insulin    Nonsmoker    Anemia due to chronic kidney disease    Class 3 severe obesity due to excess calories with body mass index (BMI) of 40.0 to 44.9 in adult    Anasarca    Sleep apnea with use of continuous positive airway pressure (CPAP)    Medically noncompliant    Diabetic ulcer of left foot associated with type 2 diabetes mellitus    Diabetic polyneuropathy associated with type 2 diabetes mellitus    Spinal stenosis in cervical region    Degeneration of cervical intervertebral disc    Cervical radiculopathy    Degeneration of lumbar or lumbosacral intervertebral disc    Cervical myelopathy    Bilateral carpal tunnel syndrome    CAD (coronary artery disease)    GERD without esophagitis    BPH without obstruction/lower urinary tract symptoms    Stage 3b chronic kidney disease    Chronic diastolic heart failure    Type 2 myocardial infarction due to heart failure    Left carpal tunnel syndrome    Syncope and collapse, recurrent episodes    Poorly-controlled hypertension    Rhinovirus    Peripheral vascular disease    Chronic kidney disease (CKD), stage IV (severe)    Diabetic foot infection    Sepsis    Epigastric pain    Chronic heart failure with preserved ejection fraction (HFpEF)    Sepsis  due to methicillin resistant Staphylococcus aureus (MRSA) with encephalopathy without septic shock    Slow transit constipation    CHF exacerbation    CHF (congestive heart failure), NYHA class I, acute on chronic, combined    Rectal bleeding    Acute on chronic heart failure with preserved ejection fraction (HFpEF)    DKA, type 2, not at goal    Atrial fibrillation    E. coli UTI, ESBL    Acute UTI (urinary tract infection)    Acute encephalopathy    Type 2 diabetes mellitus with hyperglycemia, with long-term current use of insulin     Past Medical History:   Diagnosis Date    Arthritis     Autonomic disease     CAD (coronary artery disease) 02/06/2017    Cervical radiculopathy 09/16/2021    Chronic constipation with acute exaccerbation 05/10/2021    Coronary artery disease     Degeneration of cervical intervertebral disc 08/11/2021    Diabetes mellitus     Diabetic foot ulcer 08/31/2020    Diabetic polyneuropathy associated with type 2 diabetes mellitus 01/18/2021    Elevated cholesterol     Gastroesophageal reflux disease 05/13/2019    Headache     HTN (hypertension), benign 05/03/2017    Hyperlipidemia     Hypertension     Mixed hyperlipidemia 02/07/2017    Multiple lung nodules 01/26/2020    5mm, 9 mm RLL identified 1/2020, not present 10/2019.    Myocardial infarction     Osteomyelitis 01/22/2020    Osteomyelitis of fifth toe of right foot 10/07/2019    Pancreatitis     Persistent insomnia 01/20/2020    Renal disorder     Sleep apnea 02/06/2017    Sleep apnea with use of continuous positive airway pressure (CPAP)     NON-COMPLIANT    Slow transit constipation 01/16/2019    Spinal stenosis in cervical region 09/16/2021    Vitamin D deficiency 03/02/2021     Past Surgical History:   Procedure Laterality Date    ABDOMINAL SURGERY      AMPUTATION FOOT / TOE Left 10/2021    5th digit     ANTERIOR CERVICAL DISCECTOMY W/ FUSION N/A 08/05/2022    Procedure: CERVICAL DISCECTOMY ANTERIOR WITH FUSION C5-6 with possible  plating of C5-7 with neuromonitoring and 1 c-arm;  Surgeon: Karel Soliz MD;  Location: [facility] OR;  Service: Neurosurgery;  Laterality: N/A;    APPENDECTOMY      BACK SURGERY      CARDIAC CATHETERIZATION Left 02/08/2021    Procedure: Left Heart Cath w poss intervention left anatomical snuff box acess;  Surgeon: Omkar Charles DO;  Location: [facility] CATH INVASIVE LOCATION;  Service: Cardiology;  Laterality: Left;    CARDIAC SURGERY      CATARACT EXTRACTION      CERVICAL SPINE SURGERY      COLONOSCOPY N/A 01/31/2017    Normal exam repeat in 5 years    COLONOSCOPY N/A 02/11/2019    Mild acute inflammation    COLONOSCOPY N/A 04/07/2024    2 areas at 10 and 20 cm with friability ulceration 2 clips placed at 20 cm and 4 clips at 10 cm poor prep normal mucosa,mild eroisions and ulcerations in visible vessels    COLONOSCOPY N/A 7/1/2024    Procedure: COLONOSCOPY WITH ANESTHESIA;  Surgeon: Arsalan Lorenzo DO;  Location: [facility] ENDOSCOPY;  Service: Gastroenterology;  Laterality: N/A;  pre op constipation/diarrhea  post poor prep  pcp Del Shetty    COLONOSCOPY N/A 7/2/2024    Procedure: COLONOSCOPY WITH ANESTHESIA;  Surgeon: Agapito Christopher MD;  Location: [facility] ENDOSCOPY;  Service: Gastroenterology;  Laterality: N/A;  pre rectal bleeding  post poor prep  pcp Del Shetty MD    COLONOSCOPY W/ POLYPECTOMY  03/04/2014    Hyperplastic polyp    CORONARY ARTERY BYPASS GRAFT  10/2015    ENDOSCOPY  04/13/2011    Gastritis with hemorrhage    ENDOSCOPY N/A 05/05/2017    Normal exam    ENDOSCOPY N/A 02/11/2019    Gastritis    ENDOSCOPY N/A 09/01/2020    Non-erosive gastritis with hemorrhage    ENDOSCOPY N/A 02/10/2021    Esophagitis    ENDOSCOPY N/A 04/11/2024    There were esophageal mucosal changes suspicious for short-segment Low's esophagus present in the distal esophagus. The maximum longitudinal extent of these mucosal changes was 2 cm in length. Mucosa was biopsied with a cold forceps for  histologyDistal esophagus, biopsies: Mild chronic active esophagogastritis. No evidence of intestinal metaplasia, dysplasia. Antrum, bx, Mild chronic gastritis    FOOT SURGERY Left     INCISION AND DRAINAGE OF WOUND Left 09/2007    spider bite     PT Assessment (Last 12 Hours)       PT Evaluation and Treatment       Row Name 11/20/24 1408 11/20/24 0938       Physical Therapy Time and Intention    Subjective Information complains of;pain  -AE --    Document Type therapy note (daily note)  -AE --    Mode of Treatment physical therapy  -AE --    Comment, Session Not Performed -- --  Pt c/o nausea.PTA will check back.Pt states that boot will be here later today.  -AE      Row Name 11/20/24 1408          General Information    Existing Precautions/Restrictions fall  Pt's family has yet to bring in CAM boot so pt is NWB L LE,L LE wound  -AE       Row Name 11/20/24 1408          Pain    Pretreatment Pain Rating 3/10  -AE     Posttreatment Pain Rating 5/10  -AE     Pain Location abdomen  -AE     Pain Side/Orientation generalized  -AE     Response to Pain Interventions activity participation with increased pain  -AE       Row Name 11/20/24 1408          Bed Mobility    Supine-Sit Isabella (Bed Mobility) standby assist  -AE       Row Name 11/20/24 1408          Sit-Stand Transfer    Sit-Stand Isabella (Transfers) contact guard  sit to stand 4 reps  -AE       Row Name 11/20/24 1408          Stand-Sit Transfer    Stand-Sit Isabella (Transfers) contact guard  -AE       Row Name 11/20/24 1400          Gait/Stairs (Locomotion)    Isabella Level (Gait) minimum assist (75% patient effort)  -AE     Assistive Device (Gait) walker, front-wheeled  -AE     Patient was able to Ambulate --  steps to chair  -AE       Row Name 11/20/24 1408          Balance    Static Standing Balance contact guard  -AE     Position/Device Used, Standing Balance supported;walker, 4-wheeled  -AE       Row Name 11/20/24 1408          Motor  Skills    Therapeutic Exercise --  L LE hip flexion and abduction  3 sets 10 reps  -AE       Row Name             Wound 08/22/23 0140 Left posterior plantar    Wound - Properties Group Placement Date: 08/22/23  -AM Placement Time: 0140  -AM Side: Left  -AM Orientation: posterior  -AM Location: plantar  -AM Present on Original Admission: Y  -AM    Retired Wound - Properties Group Placement Date: 08/22/23  -AM Placement Time: 0140  -AM Present on Original Admission: Y  -AM Side: Left  -AM Orientation: posterior  -AM Location: plantar  -AM    Retired Wound - Properties Group Placement Date: 08/22/23  -AM Placement Time: 0140  -AM Present on Original Admission: Y  -AM Side: Left  -AM Orientation: posterior  -AM Location: plantar  -AM    Retired Wound - Properties Group Date first assessed: 08/22/23  -AM Time first assessed: 0140  -AM Present on Original Admission: Y  -AM Side: Left  -AM Location: plantar  -AM      Row Name             Wound 06/15/23 0102 Left anterior plantar    Wound - Properties Group Placement Date: 06/15/23  -SM Placement Time: 0102  -SM Side: Left  -SM Orientation: anterior  -SM Location: plantar  -SM Removal Date: --  -JR Removal Time: --  -JR    Retired Wound - Properties Group Placement Date: 06/15/23  -SM Placement Time: 0102  -SM Side: Left  -SM Orientation: anterior  -SM Location: plantar  -SM Removal Date: --  -JR Removal Time: --  -JR    Retired Wound - Properties Group Placement Date: 06/15/23  -SM Placement Time: 0102  -SM Side: Left  -SM Orientation: anterior  -SM Location: plantar  -SM Removal Date: --  -JR Removal Time: --  -JR    Retired Wound - Properties Group Date first assessed: 06/15/23  -SM Time first assessed: 0102  -SM Side: Left  -SM Location: plantar  -SM Resolution Date: --  -JR Resolution Time: --  -JR      Row Name             Wound 11/18/24 0046 Left lower arm skin tear;abrasion    Wound - Properties Group Placement Date: 11/18/24  -JR Placement Time: 0046  -JR Side:  Left  -JR Orientation: lower  -JR Location: arm  -JR Primary Wound Type: Abrasion  -JR Type: skin tear;abrasion  -JR Present on Original Admission: Y  -JR    Retired Wound - Properties Group Placement Date: 11/18/24  -JR Placement Time: 0046 -JR Present on Original Admission: Y  -JR Side: Left  -JR Orientation: lower  -JR Location: arm  -JR Primary Wound Type: Abrasion  -JR Type: skin tear;abrasion  -JR    Retired Wound - Properties Group Placement Date: 11/18/24  -JR Placement Time: 0046 -JR Present on Original Admission: Y  -JR Side: Left  -JR Orientation: lower  -JR Location: arm  -JR Primary Wound Type: Abrasion  -JR Type: skin tear;abrasion  -JR    Retired Wound - Properties Group Date first assessed: 11/18/24  -JR Time first assessed: 0046 -JR Present on Original Admission: Y  -JR Side: Left  -JR Location: arm  -JR Primary Wound Type: Abrasion  -JR Type: skin tear;abrasion  -JR      Row Name             Wound 11/18/24 0056 Left medial foot diabetic ulcer    Wound - Properties Group Placement Date: 11/18/24  -JR Placement Time: 0056 -JR Side: Left  -JR Orientation: medial  -JR Location: foot  -JR Primary Wound Type: Diabetic ulc  -JR Type: diabetic ulcer  -JR Present on Original Admission: Y  -JR    Retired Wound - Properties Group Placement Date: 11/18/24  -JR Placement Time: 0056 -JR Present on Original Admission: Y  -JR Side: Left  -JR Orientation: medial  -JR Location: foot  -JR Primary Wound Type: Diabetic ulc  -JR Type: diabetic ulcer  -JR    Retired Wound - Properties Group Placement Date: 11/18/24  -JR Placement Time: 0056 -JR Present on Original Admission: Y  -JR Side: Left  -JR Orientation: medial  -JR Location: foot  -JR Primary Wound Type: Diabetic ulc  -JR Type: diabetic ulcer  -JR    Retired Wound - Properties Group Date first assessed: 11/18/24  -JR Time first assessed: 0056 -JR Present on Original Admission: Y  -JR Side: Left  -JR Location: foot  -JR Primary Wound Type: Diabetic ulc  -JR  Type: diabetic ulcer  -      Row Name 11/20/24 1408          Positioning and Restraints    Pre-Treatment Position in bed  -AE     Post Treatment Position chair  -AE     In Chair sitting;call light within reach;exit alarm on;with family/caregiver  -AE               User Key  (r) = Recorded By, (t) = Taken By, (c) = Cosigned By      Initials Name Provider Type    AE Hansa Arambula PTA Physical Therapist Assistant    Faviola Kunz, RN Registered Nurse    Michelle Diaz RN Registered Nurse    Sabas Young LPNA Licensed Nurse    Sabas Young RN Registered Nurse                    Physical Therapy Education       Title: PT OT SLP Therapies (In Progress)       Topic: Physical Therapy (Not Started)       Point: Mobility training (Done)       Learning Progress Summary            Patient Acceptance, E, VU by JACIEL at 11/19/2024 0958    Comment: limited gait until boot for RLE    Acceptance, E,TB,D, VU,NR by  at 11/18/2024 1128    Comment: education re: purpose of PT/importance of activity, safety/falls prevention, NWB L LE due to open wounds, improved tech w/ tfers, positioning w/ L LE elevated                      Point: Home exercise program (Not Started)       Learner Progress:  Not documented in this visit.              Point: Precautions (Done)       Learning Progress Summary            Patient Acceptance, E,TB,D, VU,NR by  at 11/18/2024 1128    Comment: education re: purpose of PT/importance of activity, safety/falls prevention, NWB L LE due to open wounds, improved tech w/ tfers, positioning w/ L LE elevated                                      User Key       Initials Effective Dates Name Provider Type Discipline    JACIEL 02/03/23 -  Sabas Maharaj PTA Physical Therapist Assistant PT     08/02/18 -  Melly Mustafa PT Physical Therapist PT                  PT Recommendation and Plan     Progress: improving  Outcome Evaluation: Pt c/o abdominal pain 6/10 while up.Pt was SBA supine  to sit and CGA to stand.He was CGA-min x1 to make scoots with R foot and t/f to chair with RW.Pt practiced sit to stand x 4 reps and needed cues for NWB L LE.Pt's family unable to locate CAM boot so pt continues to be NWB L LE. PT will continue to progress patient.A new CAM boot may need to be ordered.   Outcome Measures       Row Name 11/19/24 0958             How much help from another person do you currently need...    Turning from your back to your side while in flat bed without using bedrails? 4  -JACIEL      Moving from lying on back to sitting on the side of a flat bed without bedrails? 4  -JACIEL      Moving to and from a bed to a chair (including a wheelchair)? 3  -JACIEL      Standing up from a chair using your arms (e.g., wheelchair, bedside chair)? 3  -JACIEL      Climbing 3-5 steps with a railing? 1  -JACIEL      To walk in hospital room? 2  -JACIEL      AM-PAC 6 Clicks Score (PT) 17  -JACIEL         Functional Assessment    Outcome Measure Options AM-PAC 6 Clicks Basic Mobility (PT)  -JACIEL                User Key  (r) = Recorded By, (t) = Taken By, (c) = Cosigned By      Initials Name Provider Type    JACIEL Sabas Maharaj, PTA Physical Therapist Assistant                     Time Calculation:    PT Charges       Row Name 11/20/24 1455             Time Calculation    Start Time 1408  -AE      Stop Time 1438  -AE      Time Calculation (min) 30 min  -AE      PT Received On 11/20/24  -AE      PT Goal Re-Cert Due Date 11/28/24  -AE         Time Calculation- PT    Total Timed Code Minutes- PT 30 minute(s)  -AE         Timed Charges    34091 - PT Therapeutic Activity Minutes 30  -AE         Total Minutes    Timed Charges Total Minutes 30  -AE       Total Minutes 30  -AE                User Key  (r) = Recorded By, (t) = Taken By, (c) = Cosigned By      Initials Name Provider Type    AE Hansa Arambula, PTA Physical Therapist Assistant                  Therapy Charges for Today       Code Description Service Date Service Provider Modifiers  Qty    [id number]  PT THERAPEUTIC ACT EA 15 MIN 11/20/2024 Hansa Arambula, PTA GP 2            PT G-Codes  Outcome Measure Options: AM-PAC 6 Clicks Daily Activity (OT)  AM-PAC 6 Clicks Score (PT): 17  AM-PAC 6 Clicks Score (OT): 21    Hansa Arambula PTA  11/20/2024

## 2024-11-20 NOTE — PLAN OF CARE
Goal Outcome Evaluation:  Plan of Care Reviewed With: patient        Progress: improving  Outcome Evaluation: Pt c/o abdominal pain 6/10 while up.Pt was SBA supine to sit and CGA to stand.He was CGA-min x1 to make scoots with R foot and t/f to chair with RW.Pt practiced sit to stand x 4 reps and needed cues for NWB L LE.Pt's family unable to locate CAM boot so pt continues to be NWB L LE. PT will continue to progress patient.A new CAM boot may need to be ordered.

## 2024-11-20 NOTE — THERAPY EVALUATION
Patient Name: Erick Luong  : 1955    MRN: [id number]                              Today's Date: 2024       Admit Date: 2024    Visit Dx:     ICD-10-CM ICD-9-CM   1. Acute UTI (urinary tract infection)  N39.0 599.0   2. Acute renal failure superimposed on chronic kidney disease, unspecified acute renal failure type, unspecified CKD stage  N17.9 584.9    N18.9 585.9   3. Acute hyperglycemia  R73.9 790.29   4. Impaired functional mobility and activity tolerance [Z74.09]  Z74.09 V49.89     Patient Active Problem List   Diagnosis    Obesity, unspecified obesity severity, unspecified obesity type    Essential hypertension    Type 2 diabetes mellitus with hyperglycemia, with long-term current use of insulin    Nonsmoker    Anemia due to chronic kidney disease    Class 3 severe obesity due to excess calories with body mass index (BMI) of 40.0 to 44.9 in adult    Anasarca    Sleep apnea with use of continuous positive airway pressure (CPAP)    Medically noncompliant    Diabetic ulcer of left foot associated with type 2 diabetes mellitus    Diabetic polyneuropathy associated with type 2 diabetes mellitus    Spinal stenosis in cervical region    Degeneration of cervical intervertebral disc    Cervical radiculopathy    Degeneration of lumbar or lumbosacral intervertebral disc    Cervical myelopathy    Bilateral carpal tunnel syndrome    CAD (coronary artery disease)    GERD without esophagitis    BPH without obstruction/lower urinary tract symptoms    Stage 3b chronic kidney disease    Chronic diastolic heart failure    Type 2 myocardial infarction due to heart failure    Left carpal tunnel syndrome    Syncope and collapse, recurrent episodes    Poorly-controlled hypertension    Rhinovirus    Peripheral vascular disease    Chronic kidney disease (CKD), stage IV (severe)    Diabetic foot infection    Sepsis    Epigastric pain    Chronic heart failure with preserved ejection fraction (HFpEF)    Sepsis due to  methicillin resistant Staphylococcus aureus (MRSA) with encephalopathy without septic shock    Slow transit constipation    CHF exacerbation    CHF (congestive heart failure), NYHA class I, acute on chronic, combined    Rectal bleeding    Acute on chronic heart failure with preserved ejection fraction (HFpEF)    DKA, type 2, not at goal    Atrial fibrillation    E. coli UTI, ESBL    Acute UTI (urinary tract infection)    Acute encephalopathy    Type 2 diabetes mellitus with hyperglycemia, with long-term current use of insulin     Past Medical History:   Diagnosis Date    Arthritis     Autonomic disease     CAD (coronary artery disease) 02/06/2017    Cervical radiculopathy 09/16/2021    Chronic constipation with acute exaccerbation 05/10/2021    Coronary artery disease     Degeneration of cervical intervertebral disc 08/11/2021    Diabetes mellitus     Diabetic foot ulcer 08/31/2020    Diabetic polyneuropathy associated with type 2 diabetes mellitus 01/18/2021    Elevated cholesterol     Gastroesophageal reflux disease 05/13/2019    Headache     HTN (hypertension), benign 05/03/2017    Hyperlipidemia     Hypertension     Mixed hyperlipidemia 02/07/2017    Multiple lung nodules 01/26/2020    5mm, 9 mm RLL identified 1/2020, not present 10/2019.    Myocardial infarction     Osteomyelitis 01/22/2020    Osteomyelitis of fifth toe of right foot 10/07/2019    Pancreatitis     Persistent insomnia 01/20/2020    Renal disorder     Sleep apnea 02/06/2017    Sleep apnea with use of continuous positive airway pressure (CPAP)     NON-COMPLIANT    Slow transit constipation 01/16/2019    Spinal stenosis in cervical region 09/16/2021    Vitamin D deficiency 03/02/2021     Past Surgical History:   Procedure Laterality Date    ABDOMINAL SURGERY      AMPUTATION FOOT / TOE Left 10/2021    5th digit     ANTERIOR CERVICAL DISCECTOMY W/ FUSION N/A 08/05/2022    Procedure: CERVICAL DISCECTOMY ANTERIOR WITH FUSION C5-6 with possible plating  of C5-7 with neuromonitoring and 1 c-arm;  Surgeon: Karel Soliz MD;  Location: [facility] OR;  Service: Neurosurgery;  Laterality: N/A;    APPENDECTOMY      BACK SURGERY      CARDIAC CATHETERIZATION Left 02/08/2021    Procedure: Left Heart Cath w poss intervention left anatomical snuff box acess;  Surgeon: Omkar Charles DO;  Location: [facility] CATH INVASIVE LOCATION;  Service: Cardiology;  Laterality: Left;    CARDIAC SURGERY      CATARACT EXTRACTION      CERVICAL SPINE SURGERY      COLONOSCOPY N/A 01/31/2017    Normal exam repeat in 5 years    COLONOSCOPY N/A 02/11/2019    Mild acute inflammation    COLONOSCOPY N/A 04/07/2024    2 areas at 10 and 20 cm with friability ulceration 2 clips placed at 20 cm and 4 clips at 10 cm poor prep normal mucosa,mild eroisions and ulcerations in visible vessels    COLONOSCOPY N/A 7/1/2024    Procedure: COLONOSCOPY WITH ANESTHESIA;  Surgeon: Arsalan Lorenzo DO;  Location: [facility] ENDOSCOPY;  Service: Gastroenterology;  Laterality: N/A;  pre op constipation/diarrhea  post poor prep  pcp Del Shetty    COLONOSCOPY N/A 7/2/2024    Procedure: COLONOSCOPY WITH ANESTHESIA;  Surgeon: Agapito Christopher MD;  Location: [facility] ENDOSCOPY;  Service: Gastroenterology;  Laterality: N/A;  pre rectal bleeding  post poor prep  pcp Del Shetty MD    COLONOSCOPY W/ POLYPECTOMY  03/04/2014    Hyperplastic polyp    CORONARY ARTERY BYPASS GRAFT  10/2015    ENDOSCOPY  04/13/2011    Gastritis with hemorrhage    ENDOSCOPY N/A 05/05/2017    Normal exam    ENDOSCOPY N/A 02/11/2019    Gastritis    ENDOSCOPY N/A 09/01/2020    Non-erosive gastritis with hemorrhage    ENDOSCOPY N/A 02/10/2021    Esophagitis    ENDOSCOPY N/A 04/11/2024    There were esophageal mucosal changes suspicious for short-segment Low's esophagus present in the distal esophagus. The maximum longitudinal extent of these mucosal changes was 2 cm in length. Mucosa was biopsied with a cold forceps for histologyDistal  esophagus, biopsies: Mild chronic active esophagogastritis. No evidence of intestinal metaplasia, dysplasia. Antrum, bx, Mild chronic gastritis    FOOT SURGERY Left     INCISION AND DRAINAGE OF WOUND Left 09/2007    spider bite      General Information       Row Name 11/20/24 0731          OT Time and Intention    Document Type evaluation  cc: AMS, suprapubic pain, and burning with urination. Dx: Acute UTI, Acute encephalopathy. H/o Type 2 DM, Afib, CHFpEF, recent rib fx, HTN, diabetic foot wounds on LLE  -LS     Mode of Treatment occupational therapy  -LS       Row Name 11/20/24 0731          General Information    Patient Profile Reviewed yes  -LS     Prior Level of Function independent:;ADL's;all household mobility;home management;cooking;driving;shopping;dependent:;cleaning  Utilizes rwx during fxl ambulation  -LS     Existing Precautions/Restrictions fall;other (see comments)  wound on L heel; Pt reports that he is supposed to wear a cam boot on this foot, however, Pt's wife has been unable to bring the boot; will assume NWB without boot  -LS     Barriers to Rehab previous functional deficit;medically complex;impaired sensation;physical barrier  -LS       Row Name 11/20/24 0731          Occupational Profile    Environmental Supports and Barriers (Occupational Profile) Tub shower with grab bar. Other AD/DME: rwx  -       Row Name 11/20/24 0731          Living Environment    People in Home spouse  -       Row Name 11/20/24 0731          Home Main Entrance    Number of Stairs, Main Entrance one  -LS     Stair Railings, Main Entrance none  -LS       Row Name 11/20/24 0731          Stairs Within Home, Primary    Number of Stairs, Within Home, Primary none  -LS       Row Name 11/20/24 0731          Cognition    Orientation Status (Cognition) oriented x 4  -LS       Row Name 11/20/24 0731          Safety Issues/Impairments Affecting Functional Mobility    Safety Issues Affecting Function (Mobility) safety  precaution awareness;safety precautions follow-through/compliance  -     Impairments Affecting Function (Mobility) balance;endurance/activity tolerance;pain;strength;sensation/sensory awareness  -               User Key  (r) = Recorded By, (t) = Taken By, (c) = Cosigned By      Initials Name Provider Type     Alesha Barrett OTR/L Occupational Therapist                     Mobility/ADL's       Row Name 11/20/24 0731          Bed Mobility    Bed Mobility supine-sit  -     Supine-Sit [place] (Bed Mobility) standby assist  -     Assistive Device (Bed Mobility) bed rails;head of bed elevated  -       Row Name 11/20/24 0731          Transfers    Transfers sit-stand transfer;stand-sit transfer;bed-chair transfer  -       Row Name 11/20/24 0731          Bed-Chair Transfer    Bed-Chair [place] (Transfers) contact guard;verbal cues  -     Assistive Device (Bed-Chair Transfers) walker, front-wheeled  -       Row Name 11/20/24 0731          Sit-Stand Transfer    Sit-Stand [place] (Transfers) contact guard;verbal cues  -     Assistive Device (Sit-Stand Transfers) walker, front-wheeled  -LS       Row Name 11/20/24 07          Stand-Sit Transfer    Stand-Sit [place] (Transfers) contact guard;verbal cues  -     Assistive Device (Stand-Sit Transfers) walker, front-wheeled  -       Row Name 11/20/24 0731          Activities of Daily Living    BADL Assessment/Intervention lower body dressing  -       Row Name 11/20/24 0731          Mobility    Extremity Weight-bearing Status left lower extremity  -     Left Lower Extremity (Weight-bearing Status) other (see comments)  wound on L heel; Pt reports that he is supposed to wear a cam boot on this foot, however, Pt's wife has been unable to bring the boot; will assume NWB without boot  -       Row Name 11/20/24 0731          Lower Body Dressing Assessment/Training    [place] Level (Lower Body Dressing) don;socks;minimum assist (75%  patient effort)  -LS     Position (Lower Body Dressing) unsupported sitting  -LS               User Key  (r) = Recorded By, (t) = Taken By, (c) = Cosigned By      Initials Name Provider Type    Alesha Bloom OTR/L Occupational Therapist                   Obj/Interventions       Row Name 11/20/24 0731          Sensory Assessment (Somatosensory)    Sensory Assessment Pt reports numbness/tingling in B hands and feet; BUE sensation otherwise intact  -       Row Name 11/20/24 0731          Vision Assessment/Intervention    Visual Impairment/Limitations WFL  -LS       Row Name 11/20/24 0731          Range of Motion Comprehensive    General Range of Motion bilateral upper extremity ROM WFL  -LS       Row Name 11/20/24 0731          Strength Comprehensive (MMT)    Comment, General Manual Muscle Testing (MMT) Assessment BUE strength grossly 5/5  -       Row Name 11/20/24 0731          Balance    Balance Assessment sitting static balance;sitting dynamic balance;standing static balance;standing dynamic balance  -LS     Static Sitting Balance standby assist  -LS     Dynamic Sitting Balance standby assist  -LS     Position, Sitting Balance sitting edge of bed;unsupported  -LS     Static Standing Balance contact guard  -LS     Dynamic Standing Balance contact guard  -LS     Position/Device Used, Standing Balance supported;walker, front-wheeled  -LS               User Key  (r) = Recorded By, (t) = Taken By, (c) = Cosigned By      Initials Name Provider Type    Alesha Bloom OTR/L Occupational Therapist                   Goals/Plan       Row Name 11/20/24 0731          Transfer Goal 1 (OT)    Activity/Assistive Device (Transfer Goal 1, OT) toilet;tub  -LS     [place] Level/Cues Needed (Transfer Goal 1, OT) supervision required  -LS     Time Frame (Transfer Goal 1, OT) long term goal (LTG);10 days  -LS     Progress/Outcome (Transfer Goal 1, OT) new goal  -       Row Name 11/20/24 0731          Dressing Goal 1 (OT)     Activity/Device (Dressing Goal 1, OT) dressing skills, all  -LS     [place]/Cues Needed (Dressing Goal 1, OT) supervision required  -LS     Time Frame (Dressing Goal 1, OT) long term goal (LTG);10 days  -LS     Progress/Outcome (Dressing Goal 1, OT) new goal  -LS       Row Name 11/20/24 0731          Toileting Goal 1 (OT)    Activity/Device (Toileting Goal 1, OT) toileting skills, all  -LS     [place] Level/Cues Needed (Toileting Goal 1, OT) supervision required  -LS     Time Frame (Toileting Goal 1, OT) long term goal (LTG);10 days  -LS     Progress/Outcome (Toileting Goal 1, OT) new goal  -       Row Name 11/20/24 0731          Problem Specific Goal 1 (OT)    Problem Specific Goal 1 (OT) Pt will independently implement one pain management technique to decrease pain and improve functional adl performance.  -LS     Time Frame (Problem Specific Goal 1, OT) long term goal (LTG);by discharge  -LS     Progress/Outcome (Problem Specific Goal 1, OT) new goal  -       Row Name 11/20/24 0731          Therapy Assessment/Plan (OT)    Planned Therapy Interventions (OT) transfer/mobility retraining;strengthening exercise;patient/caregiver education/training;occupation/activity based interventions;functional balance retraining;activity tolerance training;BADL retraining;adaptive equipment training;ROM/therapeutic exercise  -LS               User Key  (r) = Recorded By, (t) = Taken By, (c) = Cosigned By      Initials Name Provider Type    LS Alesha Barrett OTR/L Occupational Therapist                   Clinical Impression       Row Name 11/20/24 0731          Pain Assessment    Pretreatment Pain Rating 6/10  -LS     Posttreatment Pain Rating 6/10  -LS     Pain Location abdomen  -LS     Pain Management Interventions exercise or physical activity utilized;nursing notified  -LS     Response to Pain Interventions activity participation with tolerable pain  -LS       Row Name 11/20/24 0731          Plan of Care Review     Plan of Care Reviewed With patient  -LS     Progress no change  -     Outcome Evaluation OT eval completed. Pt in fowlers upon therapist arrival; A&Ox4; c/o 6/10 abdominal pain. Pt reports Mod I-I with all BADLs including fxl ambulation at baseline. Pt reports that he is supposed to wear a CAM boot on LLE when standing/ambulating, however, he does not have it at this time. Pt reports that his wife should be able to bring it today. OT assumed NWB on LLE without boot for eval purposes. Pt performed supine>sit utilizing bedrail with HOB elevated with SBA. Pt adjusted B socks while seated EOB with SBA.  Pt performed sit<>stand and took a couple steps from bed>chair utilizing rwx with CGA and verbal cues for safety awareness and to maintain NWB on LLE. Despite cueing, Pt able to maintain TTWB, but unable to maintain NWB on LLE during transfer this date. BUE strength WFL. Skilled OT intervention indicated in order to address deficits in fxl mobility, fxl activity tolerance, balance, strength, and use of adaptive techniques/equipment during performance of BADLs. Anticipate Pt to return home with assist and HH at discharge.  -       Row Name 11/20/24 0731          Therapy Assessment/Plan (OT)    Rehab Potential (OT) good  -     Criteria for Skilled Therapeutic Interventions Met (OT) yes;skilled treatment is necessary  -     Therapy Frequency (OT) 5 times/wk  -       Row Name 11/20/24 0731          Therapy Plan Review/Discharge Plan (OT)    Anticipated Discharge Disposition (OT) home with assist;home with home health  -       Row Name 11/20/24 0731          Positioning and Restraints    Pre-Treatment Position in bed  -LS     Post Treatment Position chair  -LS     In Chair reclined;call light within reach;encouraged to call for assist;exit alarm on;legs elevated;notified nsg  -               User Key  (r) = Recorded By, (t) = Taken By, (c) = Cosigned By      Initials Name Provider Type    Alesha Bloom  OTR/L Occupational Therapist                   Outcome Measures       Row Name 11/20/24 0731          How much help from another is currently needed...    Putting on and taking off regular lower body clothing? 3  -LS     Bathing (including washing, rinsing, and drying) 3  -LS     Toileting (which includes using toilet bed pan or urinal) 3  -LS     Putting on and taking off regular upper body clothing 4  -LS     Taking care of personal grooming (such as brushing teeth) 4  -LS     Eating meals 4  -LS     AM-PAC 6 Clicks Score (OT) 21  -LS       Row Name 11/20/24 0731          Functional Assessment    Outcome Measure Options AM-PAC 6 Clicks Daily Activity (OT)  -               User Key  (r) = Recorded By, (t) = Taken By, (c) = Cosigned By      Initials Name Provider Type    Alesha Bloom OTR/L Occupational Therapist                    Occupational Therapy Education       Title: PT OT SLP Therapies (In Progress)       Topic: Occupational Therapy (In Progress)       Point: ADL training (Done)       Description:   Instruct learner(s) on proper safety adaptation and remediation techniques during self care or transfers.   Instruct in proper use of assistive devices.                  Learning Progress Summary            Patient Acceptance, E, VU,NR by  at 11/20/2024 0825                      Point: Home exercise program (Not Started)       Description:   Instruct learner(s) on appropriate technique for monitoring, assisting and/or progressing therapeutic exercises/activities.                  Learner Progress:  Not documented in this visit.              Point: Precautions (Done)       Description:   Instruct learner(s) on prescribed precautions during self-care and functional transfers.                  Learning Progress Summary            Patient Acceptance, E, VU,NR by  at 11/20/2024 0825                      Point: Body mechanics (Done)       Description:   Instruct learner(s) on proper positioning and spine  alignment during self-care, functional mobility activities and/or exercises.                  Learning Progress Summary            Patient Acceptance, E, VU,NR by  at 11/20/2024 0825                                      User Key       Initials Effective Dates Name Provider Type Discipline    BLAYNE 06/20/22 -  Alesha Barrett, OTR/L Occupational Therapist OT                  OT Recommendation and Plan  Planned Therapy Interventions (OT): transfer/mobility retraining, strengthening exercise, patient/caregiver education/training, occupation/activity based interventions, functional balance retraining, activity tolerance training, BADL retraining, adaptive equipment training, ROM/therapeutic exercise  Therapy Frequency (OT): 5 times/wk  Plan of Care Review  Plan of Care Reviewed With: patient  Progress: no change  Outcome Evaluation: OT eval completed. Pt in fowlers upon therapist arrival; A&Ox4; c/o 6/10 abdominal pain. Pt reports Mod I-I with all BADLs including fxl ambulation at baseline. Pt reports that he is supposed to wear a CAM boot on LLE when standing/ambulating, however, he does not have it at this time. Pt reports that his wife should be able to bring it today. OT assumed NWB on LLE without boot for eval purposes. Pt performed supine>sit utilizing bedrail with HOB elevated with SBA. Pt adjusted B socks while seated EOB with SBA.  Pt performed sit<>stand and took a couple steps from bed>chair utilizing rwx with CGA and verbal cues for safety awareness and to maintain NWB on LLE. Despite cueing, Pt able to maintain TTWB, but unable to maintain NWB on LLE during transfer this date. BUE strength WFL. Skilled OT intervention indicated in order to address deficits in fxl mobility, fxl activity tolerance, balance, strength, and use of adaptive techniques/equipment during performance of BADLs. Anticipate Pt to return home with assist and HH at discharge.     Time Calculation:         Time Calculation- OT       Row Name  11/20/24 0813             Time Calculation- OT    OT Start Time 0731  +10 minutes chart review  -LS      OT Stop Time 0814  -LS      OT Time Calculation (min) 43 min  -LS      OT Non-Billable Time (min) --  -LS      OT Received On 11/20/24  -      OT Goal Re-Cert Due Date 11/30/24  -                User Key  (r) = Recorded By, (t) = Taken By, (c) = Cosigned By      Initials Name Provider Type     Alesha Barrett, OTR/L Occupational Therapist                  Therapy Charges for Today       Code Description Service Date Service Provider Modifiers Qty    [id number] HC OT EVAL MOD COMPLEXITY 4 11/20/2024 Alesha Barrett OTR/L GO 1                 Alesha Barrett OTR/EDVIN  11/20/2024

## 2024-11-20 NOTE — PROGRESS NOTES
Nephrology ([facility]) Progress Note      Patient:  Erick Luong  YOB: 1955  Date of Service: 11/19/2024  MRN: [id number]   Acct: [id number]   Primary Care Physician: Del Shetty MD  Advance Directive:   Code Status and Medical Interventions: CPR (Attempt to Resuscitate); Full Support   Ordered at: 11/18/24 0159     Code Status (Patient has no pulse and is not breathing):    CPR (Attempt to Resuscitate)     Medical Interventions (Patient has pulse or is breathing):    Full Support     Admit Date: 11/17/2024       Hospital Day: 0  Referring Provider: Garrett Alicia,*      Patient personally seen and examined.  Complete chart including Consults, Notes, Operative Reports, Labs, Cardiology, and Radiology studies reviewed as able.        Subjective:  Erick Luong is a 69 y.o. male for whom we were consulted for evaluation and treatment of acute kidney injury with history of chronic kidney disease. Baseline chronic kidney disease stage 3b. Baseline creatinine approximately 1.5-1.8. Follows in our office.  History of poorly controlled type 2 diabetes, hypertension, coronary artery disease, diabetic foot ulcer, obesity.  Patient was admitted with complaints of cystitis and failure to thrive along with foot wound.  He had had no dietary intake on the day of consult per review of notes.  He denies specific complaints upon questioning.  No evident peripheral edema.     Today, no overnight events.  Seen with nursing.  No failure present.  Patient denied complaints upon questioning.  Up in chair.    Allergies:  Cefepime, Bactrim [sulfamethoxazole-trimethoprim], Vancomycin, Zolpidem, and Metronidazole    Home Meds:  Medications Prior to Admission   Medication Sig Dispense Refill Last Dose/Taking    apixaban (Eliquis) 5 MG tablet tablet Take 1 tablet by mouth 2 (Two) Times a Day. 60 tablet 0 Taking    ascorbic acid (VITAMIN C) 1000 MG tablet Take 1 tablet by mouth Daily. 30  tablet 3 Taking    bumetanide (BUMEX) 2 MG tablet Take 1 tablet by mouth 2 (Two) Times a Day. 6 tablet 3 Taking    busPIRone (BUSPAR) 10 MG tablet Take 1 tablet by mouth 3 (Three) Times a Day As Needed. (Patient taking differently: Take 1 tablet by mouth 3 (Three) Times a Day As Needed (anxiety).) 270 tablet 4 Taking Differently    donepezil (ARICEPT) 10 MG tablet Take 1 tablet by mouth every night at bedtime. 30 tablet 0 Taking    DULoxetine (CYMBALTA) 60 MG capsule Take 1 capsule by mouth Daily. 30 capsule 0 Taking    famotidine (PEPCID) 20 MG tablet Take 1 tablet by mouth 2 (Two) Times a Day. 60 tablet 0 Taking    insulin glargine (Lantus) 100 UNIT/ML injection Inject 35 Units under the skin into the appropriate area as directed every night at bedtime. 10 mL 0 Taking    lisinopril (PRINIVIL,ZESTRIL) 5 MG tablet Take 1 tablet by mouth Daily. 30 tablet 0 Taking    melatonin 3 MG tablet Take 2 tablets by mouth At Night As Needed for Sleep. 30 tablet 0 Taking As Needed    midodrine (PROAMATINE) 2.5 MG tablet Take 1 tablet by mouth 2 (Two) Times a Day. 60 tablet 5 Taking    oxyCODONE (ROXICODONE) 10 MG tablet Take 1 tablet by mouth 2 (Two) Times a Day As Needed. (Patient taking differently: Take 1 tablet by mouth 2 (Two) Times a Day As Needed for Moderate Pain or Severe Pain. *must last 30 days*) 55 tablet 0 Taking Differently    pantoprazole (PROTONIX) 40 MG EC tablet Take 1 tablet by mouth 2 (Two) Times a Day. 60 tablet 0 Taking    polyethylene glycol (MIRALAX) 17 GM/SCOOP powder Take 17 g by mouth Daily As Needed (constipation).   Taking As Needed    potassium chloride ER (K-TAB) 20 MEQ tablet controlled-release ER tablet Take 1 tablet by mouth Daily. 30 tablet 0 Taking    rosuvastatin (CRESTOR) 10 MG tablet Take 1 tablet by mouth Daily. 30 tablet 0 Taking    sennosides-docusate (PERICOLACE) 8.6-50 MG per tablet Take 1 tablet by mouth Every Night. Obtain OTC   Taking    tamsulosin (FLOMAX) 0.4 MG capsule 24 hr  capsule Take 1 capsule by mouth Daily. 90 capsule 4 Taking    Insulin Lispro (humaLOG) 100 UNIT/ML injection Inject 2-12 Units under the skin into the appropriate area as directed 4 (Four) Times a Day Before Meals & at Bedtime. Inject subcutaneously four times a day per sliding scale:  0-150 = 0 units; 151-200 = 2u; 201-250 = 4u; 251-300 = 6u; 301-350 = 8u; 351-400 = 10u; 401+ = 12u       nitroglycerin (NITROSTAT) 0.4 MG SL tablet Place 1 tablet under the tongue Every 5 (Five) Minutes As Needed for Chest Pain. Take no more than 3 doses in 15 minutes.       pregabalin (LYRICA) 100 MG capsule Take 1 capsule by mouth Daily.       pregabalin (LYRICA) 100 MG capsule Take 2 capsules by mouth Every Night.       sodium hypochlorite (DAKIN'S 1/4 STRENGTH) 0.125 % solution topical solution 0.125% Apply  topically to the appropriate area as directed 2 (Two) Times a Day. 473 mL 5        Medicines:  Current Facility-Administered Medications   Medication Dose Route Frequency Provider Last Rate Last Admin    acetaminophen (TYLENOL) tablet 650 mg  650 mg Oral Q4H PRN Garrett Alicia MD        Or    acetaminophen (TYLENOL) 160 MG/5ML oral solution 650 mg  650 mg Oral Q4H PRN Garrett Alicia MD        Or    acetaminophen (TYLENOL) suppository 650 mg  650 mg Rectal Q4H PRN Garrett Alicia MD        apixaban (ELIQUIS) tablet 5 mg  5 mg Oral BID Garrett Alicia MD   5 mg at 11/19/24 1005    ascorbic acid (VITAMIN C) tablet 500 mg  500 mg Oral BID Telly Rodriguez MD   500 mg at 11/19/24 1005    sennosides-docusate (PERICOLACE) 8.6-50 MG per tablet 2 tablet  2 tablet Oral BID PRN Garrett Alicia MD        And    polyethylene glycol (MIRALAX) packet 17 g  17 g Oral Daily PRN Garrett Alicia MD        And    bisacodyl (DULCOLAX) EC tablet 5 mg  5 mg Oral Daily PRN Garrett Alicia MD        And    bisacodyl (DULCOLAX) suppository 10 mg  10 mg Rectal Daily PRN Garrett Alicia  MD Rick        bumetanide (BUMEX) tablet 2 mg  2 mg Oral BID Garrett Alicia MD   2 mg at 11/19/24 1004    busPIRone (BUSPAR) tablet 10 mg  10 mg Oral TID PRN Garrett Alicia MD        Calcium Replacement - Follow Nurse / BPA Driven Protocol   Not Applicable PRN Telly Rodriguez MD        dextrose (D50W) (25 g/50 mL) IV injection 25 g  25 g Intravenous Q15 Min PRN Garrett Alicia MD        dextrose (GLUTOSE) oral gel 15 g  15 g Oral Q15 Min PRN Garrett Alicia MD   15 g at 11/19/24 0040    docusate sodium (COLACE) capsule 100 mg  100 mg Oral BID Telly Rodriguez MD   100 mg at 11/19/24 1541    [Held by provider] donepezil (ARICEPT) tablet 10 mg  10 mg Oral Nightly Garrett Alicia MD   10 mg at 11/18/24 0219    DULoxetine (CYMBALTA) DR capsule 30 mg  30 mg Oral Daily Telly Rodriguez MD   30 mg at 11/19/24 1005    ertapenem (INVanz) 1,000 mg in sodium chloride 0.9 % 100 mL MBP  1,000 mg Intravenous Q24H Telly Rodriguez  mL/hr at 11/19/24 1541 1,000 mg at 11/19/24 1541    famotidine (PEPCID) tablet 20 mg  20 mg Oral Daily Telly Rodriguez MD   20 mg at 11/19/24 1005    glucagon (GLUCAGEN) injection 1 mg  1 mg Intramuscular Q15 Min PRN Garrett Alicia MD        insulin glargine (LANTUS, SEMGLEE) injection 20 Units  20 Units Subcutaneous Nightly Garrett Alicia MD   20 Units at 11/18/24 2143    Insulin Lispro (humaLOG) injection 10 Units  10 Units Subcutaneous TID With Meals Garrett Alicia MD        Insulin Lispro (humaLOG) injection 2-9 Units  2-9 Units Subcutaneous 4x Daily AC & at Bedtime Garrett Alicia MD   2 Units at 11/18/24 2143    lisinopril (PRINIVIL,ZESTRIL) tablet 5 mg  5 mg Oral Daily Garrett Alicia MD   5 mg at 11/19/24 1004    Magnesium Standard Dose Replacement - Follow Nurse / BPA Driven Protocol   Not Applicable Telly Rios MD        multivitamin with minerals 1 tablet  1 tablet Oral Daily Michael  Telly HARDIN MD   1 tablet at 11/19/24 1005    nitroglycerin (NITROSTAT) SL tablet 0.4 mg  0.4 mg Sublingual Q5 Min PRN Garrett Alicia MD        ondansetron (ZOFRAN) injection 4 mg  4 mg Intravenous Q6H PRN Garrett Alicia MD   4 mg at 11/18/24 0232    oxyCODONE-acetaminophen (PERCOCET) 5-325 MG per tablet 1 tablet  1 tablet Oral Q4H PRN Telly Rodriguez MD   1 tablet at 11/19/24 1936    Phosphorus Replacement - Follow Nurse / BPA Driven Protocol   Not Applicable PRN Telly Rodriguez MD        Potassium Replacement - Follow Nurse / BPA Driven Protocol   Not Applicable PRN Telly Rodriguez MD        pregabalin (LYRICA) capsule 75 mg  75 mg Oral Nightly Telly Rodriguez MD   75 mg at 11/18/24 2143    rosuvastatin (CRESTOR) tablet 10 mg  10 mg Oral Daily Garrett Alicia MD   10 mg at 11/19/24 1004    sennosides-docusate (PERICOLACE) 8.6-50 MG per tablet 1 tablet  1 tablet Oral Nightly Garrett Alicia MD        sodium bicarbonate tablet 650 mg  650 mg Oral BID Telly Rodriguez MD        sodium chloride 0.9 % flush 10 mL  10 mL Intravenous Q12H Garrett Alicia MD   10 mL at 11/19/24 1005    sodium chloride 0.9 % flush 10 mL  10 mL Intravenous PRN Garrett Alicia MD        sodium chloride 0.9 % infusion 40 mL  40 mL Intravenous PRN Garrett Alicia MD        sodium chloride 0.9 % infusion  100 mL/hr Intravenous Continuous Willy Arteaga  mL/hr at 11/19/24 0613 100 mL/hr at 11/19/24 0613    tamsulosin (FLOMAX) 24 hr capsule 0.4 mg  0.4 mg Oral Daily Garrett Alicia MD   0.4 mg at 11/19/24 1004    zinc sulfate (ZINCATE) capsule 220 mg  220 mg Oral Daily Telly Rodriguez MD   220 mg at 11/19/24 1005       Past Medical History:  Past Medical History:   Diagnosis Date    Arthritis     Autonomic disease     CAD (coronary artery disease) 02/06/2017    Cervical radiculopathy 09/16/2021    Chronic constipation with acute exaccerbation 05/10/2021     Coronary artery disease     Degeneration of cervical intervertebral disc 08/11/2021    Diabetes mellitus     Diabetic foot ulcer 08/31/2020    Diabetic polyneuropathy associated with type 2 diabetes mellitus 01/18/2021    Elevated cholesterol     Gastroesophageal reflux disease 05/13/2019    Headache     HTN (hypertension), benign 05/03/2017    Hyperlipidemia     Hypertension     Mixed hyperlipidemia 02/07/2017    Multiple lung nodules 01/26/2020    5mm, 9 mm RLL identified 1/2020, not present 10/2019.    Myocardial infarction     Osteomyelitis 01/22/2020    Osteomyelitis of fifth toe of right foot 10/07/2019    Pancreatitis     Persistent insomnia 01/20/2020    Renal disorder     Sleep apnea 02/06/2017    Sleep apnea with use of continuous positive airway pressure (CPAP)     NON-COMPLIANT    Slow transit constipation 01/16/2019    Spinal stenosis in cervical region 09/16/2021    Vitamin D deficiency 03/02/2021       Past Surgical History:  Past Surgical History:   Procedure Laterality Date    ABDOMINAL SURGERY      AMPUTATION FOOT / TOE Left 10/2021    5th digit     ANTERIOR CERVICAL DISCECTOMY W/ FUSION N/A 08/05/2022    Procedure: CERVICAL DISCECTOMY ANTERIOR WITH FUSION C5-6 with possible plating of C5-7 with neuromonitoring and 1 c-arm;  Surgeon: Karel Soliz MD;  Location:  [facility] OR;  Service: Neurosurgery;  Laterality: N/A;    APPENDECTOMY      BACK SURGERY      CARDIAC CATHETERIZATION Left 02/08/2021    Procedure: Left Heart Cath w poss intervention left anatomical snuff box acess;  Surgeon: Omkar Charles DO;  Location:  [facility] CATH INVASIVE LOCATION;  Service: Cardiology;  Laterality: Left;    CARDIAC SURGERY      CATARACT EXTRACTION      CERVICAL SPINE SURGERY      COLONOSCOPY N/A 01/31/2017    Normal exam repeat in 5 years    COLONOSCOPY N/A 02/11/2019    Mild acute inflammation    COLONOSCOPY N/A 04/07/2024    2 areas at 10 and 20 cm with friability ulceration 2 clips placed at 20 cm and  4 clips at 10 cm poor prep normal mucosa,mild eroisions and ulcerations in visible vessels    COLONOSCOPY N/A 2024    Procedure: COLONOSCOPY WITH ANESTHESIA;  Surgeon: Arsalan Lorenzo DO;  Location: [facility] ENDOSCOPY;  Service: Gastroenterology;  Laterality: N/A;  pre op constipation/diarrhea  post poor prep  pcp Del Shetty    COLONOSCOPY N/A 2024    Procedure: COLONOSCOPY WITH ANESTHESIA;  Surgeon: Agapito Christopher MD;  Location: [facility] ENDOSCOPY;  Service: Gastroenterology;  Laterality: N/A;  pre rectal bleeding  post poor prep  pcp Del Shetty MD    COLONOSCOPY W/ POLYPECTOMY  2014    Hyperplastic polyp    CORONARY ARTERY BYPASS GRAFT  10/2015    ENDOSCOPY  2011    Gastritis with hemorrhage    ENDOSCOPY N/A 2017    Normal exam    ENDOSCOPY N/A 2019    Gastritis    ENDOSCOPY N/A 2020    Non-erosive gastritis with hemorrhage    ENDOSCOPY N/A 02/10/2021    Esophagitis    ENDOSCOPY N/A 2024    There were esophageal mucosal changes suspicious for short-segment Low's esophagus present in the distal esophagus. The maximum longitudinal extent of these mucosal changes was 2 cm in length. Mucosa was biopsied with a cold forceps for histologyDistal esophagus, biopsies: Mild chronic active esophagogastritis. No evidence of intestinal metaplasia, dysplasia. Antrum, bx, Mild chronic gastritis    FOOT SURGERY Left     INCISION AND DRAINAGE OF WOUND Left 2007    spider bite       Family History  Family History   Problem Relation Age of Onset    Colon cancer Father     Heart disease Father     Colon cancer Sister     Colon polyps Sister     Alzheimer's disease Mother     Coronary artery disease Sister     Coronary artery disease Sister        Social History  Social History     Socioeconomic History    Marital status:    Tobacco Use    Smoking status: Former     Current packs/day: 0.00     Types: Cigarettes     Quit date:      Years since quittin.9     Smokeless tobacco: Never    Tobacco comments:     smoked in highschool   Vaping Use    Vaping status: Never Used   Substance and Sexual Activity    Alcohol use: No    Drug use: No    Sexual activity: Defer       Review of Systems:  History obtained from chart review and the patient  General ROS: No fever or chills  Respiratory ROS: No cough, shortness of breath, wheezing  Cardiovascular ROS: No chest pain or palpitations  Gastrointestinal ROS: No abdominal pain or melena  Genito-Urinary ROS: No dysuria or hematuria  Psych ROS: No anxiety and depression  14 point ROS reviewed with the patient and negative except as noted above and in the HPI unless unable to obtain.    Objective:  Patient Vitals for the past 24 hrs:   BP Temp Temp src Pulse Resp SpO2   11/19/24 1924 119/63 98.6 °F (37 °C) Oral 75 16 98 %   11/19/24 1707 118/58 97.8 °F (36.6 °C) Oral 67 16 100 %   11/19/24 1051 126/66 97.5 °F (36.4 °C) Oral 82 16 100 %   11/19/24 0603 124/65 97.5 °F (36.4 °C) Oral 67 16 99 %   11/19/24 0343 138/86 97.8 °F (36.6 °C) Oral 65 16 100 %   11/18/24 2019 113/62 98.4 °F (36.9 °C) Oral 66 16 99 %       Intake/Output Summary (Last 24 hours) at 11/19/2024 1946  Last data filed at 11/19/2024 1924  Gross per 24 hour   Intake 1462 ml   Output 3600 ml   Net -2138 ml     General: awake/alert   Chest:  clear to auscultation bilaterally without respiratory distress  CVS: regular rate and rhythm  Abdominal: soft, nontender, positive bowel sounds  Extremities: no cyanosis or edema  Skin: warm and dry without rash      Labs:  Results from last 7 days   Lab Units 11/19/24 0412 11/18/24 0416 11/17/24 2153   WBC 10*3/mm3 7.37 9.37 10.64   HEMOGLOBIN g/dL 9.8* 9.7* 10.2*   HEMATOCRIT % 31.0* 29.3* 30.5*   PLATELETS 10*3/mm3 233 242 255         Results from last 7 days   Lab Units 11/19/24 0412 11/18/24 0416 11/17/24 2153   SODIUM mmol/L 142 140 138   POTASSIUM mmol/L 5.0 3.5 4.4   CHLORIDE mmol/L 111* 108* 106   CO2 mmol/L 21.0* 16.0*  19.0*   BUN mg/dL 45* 49* 53*   CREATININE mg/dL 2.60* 2.63* 2.83*   CALCIUM mg/dL 8.5* 8.6 8.6   EGFR mL/min/1.73 26.0* 25.7* 23.5*   BILIRUBIN mg/dL  --   --  0.3   ALK PHOS U/L  --   --  134*   ALT (SGPT) U/L  --   --  19   AST (SGOT) U/L  --   --  13   GLUCOSE mg/dL 148* 106* 348*       Radiology:   Imaging Results (Last 72 Hours)       Procedure Component Value Units Date/Time    US Renal Bilateral [[id number]] Collected: 11/19/24 0838     Updated: 11/19/24 0843    Narrative:      US RENAL BILATERAL-     HISTORY: Acute on chronic renal failure; N39.0-Urinary tract infection,  site not specified; N17.9-Acute kidney failure, unspecified;  N18.9-Chronic kidney disease, unspecified; R73.9-Hyperglycemia,  unspecified; Z74.09-Other reduced mobility     COMPARISON: 8/1/2024     FINDINGS: Sonographic imaging in the kidneys and bladder performed.     The visible abdominal aorta normal in caliber, diminished visualization  of portions of the aorta due to overlying shadowing bowel gas.     Bladder is distended and appears normal.     Kidneys are normal in echotexture. The right kidney measures 9.8 x 5.8 x  5.4 cm. There is an exophytic right mid 4.5 cm renal cyst. The left  kidney measures 11.2 x 5.9 x 7.5 cm. No left renal cyst. No  solid-appearing renal mass. No obstructing intrarenal stones. No  hydronephrosis. No abnormal perinephric fluid collection.       Impression:      1. Simple 4.5 cm right mid renal cyst. Otherwise normal sonographic  appearance of the kidneys.  2. Distended normal-appearing bladder.        This report was signed and finalized on 11/19/2024 8:40 AM by Dr. Yuliana Calhoun MD.       XR Chest 1 View [[id number]] Collected: 11/17/24 2138     Updated: 11/17/24 2143    Narrative:      XR CHEST 1 VW- 11/17/2024 8:33 PM     HISTORY: cough       COMPARISON: 10/20/2024     FINDINGS:  Upright frontal radiograph of the chest was obtained     No lung consolidation. No pleural effusion or pneumothorax.  Prior  coronary bypass. Heart size is stable. Pulmonary vasculature are  nondilated. The osseous structures and surrounding soft tissues  demonstrate no acute abnormality.       Impression:      1.  Stable chest exam without acute process. No visualized acute  infiltrate.  2.  Previous coronary bypass.     This report was signed and finalized on 11/17/2024 9:39 PM by Dr King Ceballos.               Culture:  Blood Culture   Date Value Ref Range Status   11/18/2024 No growth at 24 hours  Preliminary   11/17/2024 No growth at 24 hours  Preliminary     Urine Culture   Date Value Ref Range Status   11/17/2024 >100,000 CFU/mL Gram Negative Bacilli (A)  Preliminary         Assessment   Acute kidney injury  Chronic kidney disease stage IIIb  Hypertension  Diabetes type 2-uncontrolled by A1c 13.4  Anemia chronic kidney disease  Obesity  Metabolic acidosis  Acute cystitis     Plan:  Discussed with patient, nursing  Workup reviewed today  Monitor labs  Antibiotics per primary service  IV fluids given patient's lack of oral intake and acute kidney injury, continue to monitor further needs daily        Willy Arteaga MD  11/19/2024  19:46 CST

## 2024-11-20 NOTE — PROGRESS NOTES
[facility] Medicine Services  INPATIENT PROGRESS NOTE    Patient Name: Erick Luong  Date of Admission: 11/17/2024  Today's Date: 11/20/24  Length of Stay: 1  Primary Care Physician: Del Shetty MD    Subjective   Chief Complaint: UTI/failure to thrive/foot wound.   HPI   Blood pressure stable, afebrile.  Episode nausea vomitus morning, change Pepcid to IV, Zofran as needed, add Tums as needed.  IV fluids started by nephrology, slight improvement creatinine today.  Hold vitamin C and zinc for now due to nausea vomiting this morning.  Slight worsening of metabolic acidosis probably secondary to vomiting.  Slight increase of hemoglobin.    .  Intake/Output Summary (Last 24 hours) at 11/20/2024 0915  Last data filed at 11/20/2024 0421  Gross per 24 hour   Intake 1340 ml   Output 2700 ml   Net -1360 ml        Review of Systems   Constitutional:  Positive for activity change, appetite change and fatigue. Negative for chills and fever.   HENT:  Negative for hearing loss, nosebleeds, tinnitus and trouble swallowing.    Eyes:  Negative for visual disturbance.   Respiratory:  Negative for cough, chest tightness, shortness of breath and wheezing.    Cardiovascular:  Negative for chest pain, palpitations and leg swelling.   Gastrointestinal:  Negative for abdominal distention, abdominal pain, blood in stool, constipation, diarrhea, nausea and vomiting.   Endocrine: Negative for cold intolerance, heat intolerance, polydipsia, polyphagia and polyuria.   Genitourinary:  Negative for decreased urine volume, difficulty urinating, dysuria, flank pain, frequency and hematuria.   Musculoskeletal:  Positive for arthralgias, gait problem and myalgias. Negative for joint swelling.   Skin:  Negative for rash.   Allergic/Immunologic: Negative for immunocompromised state.   Neurological:  Positive for weakness. Negative for dizziness, syncope, light-headedness and headaches.   Hematological:   Negative for adenopathy. Does not bruise/bleed easily.   Psychiatric/Behavioral:  Negative for confusion and sleep disturbance. The patient is not nervous/anxious.    All pertinent negatives and positives are as above. All other systems have been reviewed and are negative unless otherwise stated.     Objective    Temp:  [97.5 °F (36.4 °C)-98.6 °F (37 °C)] 97.5 °F (36.4 °C)  Heart Rate:  [62-82] 62  Resp:  [16] 16  BP: (118-135)/(58-66) 135/65  Physical Exam  Vitals and nursing note reviewed.   Constitutional:       Comments: Chronically ill.   HENT:      Head: Normocephalic.   Eyes:      Conjunctiva/sclera: Conjunctivae normal.      Pupils: Pupils are equal, round, and reactive to light.   Cardiovascular:      Rate and Rhythm: Heart rates in 80s.. Rhythm irregular.      Heart sounds: Normal heart sounds.   Pulmonary:      Effort: No respiratory distress.   Abdominal:      General: Bowel sounds are normal. There is no distension.      Palpations: Abdomen is soft.      Tenderness: There is no abdominal tenderness.      Comments: Obesity.   Musculoskeletal:         General: No swelling.      Cervical back: Neck supple.   Skin:     General: Skin is warm and dry.      Capillary Refill: Capillary refill takes 2 to 3 seconds.      Findings: No rash.      Comments: Left foot wound, dressing in place.   Neurological:      Mental Status: He is alert and oriented to person, place, and time.      Motor: Weakness present.      Coordination: Coordination abnormal.      Gait: Gait abnormal.   Psychiatric:         Mood and Affect: Mood normal.         Behavior: Behavior normal.         Thought Content: Thought content normal.          Results Review:  I have reviewed the labs, radiology results, and diagnostic studies.    Laboratory Data:   Results from last 7 days   Lab Units 11/20/24  0645 11/19/24  0412 11/18/24  0416   WBC 10*3/mm3 5.94 7.37 9.37   HEMOGLOBIN g/dL 10.6* 9.8* 9.7*   HEMATOCRIT % 33.9* 31.0* 29.3*   PLATELETS  10*3/mm3 256 233 242        Results from last 7 days   Lab Units 11/20/24  0606 11/19/24  0412 11/18/24  0416 11/17/24  2153   SODIUM mmol/L 141 142 140 138   POTASSIUM mmol/L 4.6 5.0 3.5 4.4   CHLORIDE mmol/L 106 111* 108* 106   CO2 mmol/L 19.0* 21.0* 16.0* 19.0*   BUN mg/dL 43* 45* 49* 53*   CREATININE mg/dL 2.44* 2.60* 2.63* 2.83*   CALCIUM mg/dL 8.6 8.5* 8.6 8.6   BILIRUBIN mg/dL  --   --   --  0.3   ALK PHOS U/L  --   --   --  134*   ALT (SGPT) U/L  --   --   --  19   AST (SGOT) U/L  --   --   --  13   GLUCOSE mg/dL 115* 148* 106* 348*       Culture Data:   Blood Culture   Date Value Ref Range Status   11/18/2024 No growth at 2 days  Preliminary   11/17/2024 No growth at 2 days  Preliminary     Urine Culture   Date Value Ref Range Status   11/17/2024 >100,000 CFU/mL Gram Negative Bacilli (A)  Preliminary       Radiology Data:   Imaging Results (Last 24 Hours)       ** No results found for the last 24 hours. **            I have reviewed the patient's current medications.     Assessment/Plan   Assessment  Active Hospital Problems    Diagnosis     **Acute UTI (urinary tract infection)     Acute encephalopathy     Type 2 diabetes mellitus with hyperglycemia, with long-term current use of insulin     Atrial fibrillation     Chronic heart failure with preserved ejection fraction (HFpEF)     Stage 3b chronic kidney disease     CAD (coronary artery disease)     BPH without obstruction/lower urinary tract symptoms     Diabetic ulcer of left foot associated with type 2 diabetes mellitus     Sleep apnea with use of continuous positive airway pressure (CPAP)     Essential hypertension        HPI . 69-year-old male with past medical history of coronary artery disease, CABG, Afib, diabetes mellitus insulin-dependent, diabetic foot ulcer, hypertension, sleep apnea noncompliant with CPAP, right seventh rib fracture on 10/20/2024, presents emergency room with complaints of mental status changes disorientation suprapubic pain  and burning with urination.  He appears ill and debilitated, although he is afebrile.  Blood work shows normal white blood cell count UA is consistent with UTI.  He has had a recent UTI with ESBL.      Treatment Plan     UTI . UTI with history of ESBL.  Normal saline 50 cc an hour.  Invanz antibiotics.    Nausea/vomiting this morning.  Pepcid IV.  Zofran as needed.  Tums as needed.    Acute on chronic stage IIIb renal failure/metabolic acidosis.  Metabolic acidosis slight increase.  Nephrology consult.  Baseline creatinine 8/4/2024 1.6.  Bicarb p.o. Creatinine slight improvement.  IV fluids started by nephrology.  Ultrasound the kidneys-Simple 4.5 cm right mid renal cyst- Otherwise normal sonographic  appearance of the kidneys, Distended normal-appearing bladder.     Acute encephalopathy due to UTI.  Patient is oriented x 3.     Recent rib fracture and chronic pain . 7 right ribs fracture 10/20/2024.  Percocet as needed.  Start Colace.     Diabetes mellitus type 2 insulin-dependent with hyperglycemia.  Hemoglobin A1c 13.4.  Insulin regular IV given in the ER will recheck  Continue Lantus 20 units nightly  Humalog 10 units Premeal 3 times daily and Humalog moderate dose sliding scale ACHS  Hypoglycemia protocol . Uncontrolled diabetes discussed with patient.  This to be a chronic management through his primary care physician, discussed the patient.     Diabetic foot ulcers left foot.  Patient follow-up with wound care outpatient Dr. Mireya Hobbs, per patient.  Consult wound care.  See wound image in media section of chart, the wound appears clean with good margins.  Continue local care and podiatry follow-up outpatient     Atrial fibrillation/CAD/chronic diastolic congestive heart failure/CABG/hypertension/hyperlipidemia.  Continue Eliquis 5 mg p.o. twice daily.  Cut back Bumex twice daily, discussed with nephrology.  Lisinopril . Crestor.  Coreg was discontinued during the last hospitalization in August he does  not list any other rate controlling medications.  Echocardiogram 4/2/2024-61 - 65%, mild concentric hypertrophy, left ventricular diastolic function is consistent with (grade III w/high LAP), Mildly reduced right ventricular systolic function, left atrial cavity is mildly dilated, mild calcification of the aortic valve, No aortic valve regurgitation is present, No hemodynamically significant aortic valve stenosis is present.     Obstructive sleep apnea patient noncompliant with CPAP  Patient is on room air.    Anemia.  Slight increase in hemoglobin.  No sign of acute bleed.     DVT prophylaxis patient anticoagulated     Dementia.  Hold Aricept.     Neuropathy.  Lyrica nightly.     Constipation.  Sarah-Colace.     Nausea . Zofran as needed.     Depression/anxiety.  Cut back Cymbalta.    BuSpar as needed.     Prostate hypertrophy.  Flomax.     Nutrition.  Cardiac/diabetic diet.  Hold Vitamin C.  Hold zinc for now.     Deconditioning.  Fall precaution.  PT OT consult.     Blood culture-no growth for 2 days..  Urine culture-gram-negative bacilli.  Respiratory panel-negative.     Medical Decision Making  Number and Complexity of problems: UTI/altered mental//diabetes/CAD/left foot wound  Differential Diagnosis: None     Conditions and Status        Condition is unchanged.     [facility] Data  External documents reviewed: None.  Cardiac tracing (EKG, telemetry) interpretation: Irregular, rates 59  Radiology interpretation: Echo  Labs reviewed: Laboratory  Any tests that were considered but not ordered: Lab in a.m.     Decision rules/scores evaluated (example [id number], Wells, etc): None     Discussed with: Patient     Care Planning  Shared decision making: Patient  Code status and discussions: Full code.     Disposition  Social Determinants of Health that impact treatment or disposition: From home.  1 to 5 days           Electronically signed by Telly Rodriguez MD, 11/20/24, 09:00 CST.

## 2024-11-21 LAB
ANION GAP SERPL CALCULATED.3IONS-SCNC: 11 MMOL/L (ref 5–15)
BACTERIA SPEC AEROBE CULT: ABNORMAL
BUN SERPL-MCNC: 40 MG/DL (ref 8–23)
BUN/CREAT SERPL: 15.9 (ref 7–25)
CALCIUM SPEC-SCNC: 8.8 MG/DL (ref 8.6–10.5)
CHLORIDE SERPL-SCNC: 105 MMOL/L (ref 98–107)
CO2 SERPL-SCNC: 24 MMOL/L (ref 22–29)
CREAT SERPL-MCNC: 2.51 MG/DL (ref 0.76–1.27)
DEPRECATED RDW RBC AUTO: 51.2 FL (ref 37–54)
EGFRCR SERPLBLD CKD-EPI 2021: 27.2 ML/MIN/1.73
ERYTHROCYTE [DISTWIDTH] IN BLOOD BY AUTOMATED COUNT: 15.9 % (ref 12.3–15.4)
GLUCOSE BLDC GLUCOMTR-MCNC: 125 MG/DL (ref 70–130)
GLUCOSE BLDC GLUCOMTR-MCNC: 148 MG/DL (ref 70–130)
GLUCOSE BLDC GLUCOMTR-MCNC: 211 MG/DL (ref 70–130)
GLUCOSE BLDC GLUCOMTR-MCNC: 82 MG/DL (ref 70–130)
GLUCOSE SERPL-MCNC: 91 MG/DL (ref 65–99)
HCT VFR BLD AUTO: 33 % (ref 37.5–51)
HGB BLD-MCNC: 10.7 G/DL (ref 13–17.7)
MCH RBC QN AUTO: 28.4 PG (ref 26.6–33)
MCHC RBC AUTO-ENTMCNC: 32.4 G/DL (ref 31.5–35.7)
MCV RBC AUTO: 87.5 FL (ref 79–97)
PLATELET # BLD AUTO: 262 10*3/MM3 (ref 140–450)
PMV BLD AUTO: 11.5 FL (ref 6–12)
POTASSIUM SERPL-SCNC: 4.6 MMOL/L (ref 3.5–5.2)
RBC # BLD AUTO: 3.77 10*6/MM3 (ref 4.14–5.8)
SODIUM SERPL-SCNC: 140 MMOL/L (ref 136–145)
WBC NRBC COR # BLD AUTO: 6.58 10*3/MM3 (ref 3.4–10.8)

## 2024-11-21 PROCEDURE — 97110 THERAPEUTIC EXERCISES: CPT

## 2024-11-21 PROCEDURE — 25010000002 ONDANSETRON PER 1 MG: Performed by: FAMILY MEDICINE

## 2024-11-21 PROCEDURE — 25010000002 ERTAPENEM PER 500 MG: Performed by: FAMILY MEDICINE

## 2024-11-21 PROCEDURE — 97530 THERAPEUTIC ACTIVITIES: CPT

## 2024-11-21 PROCEDURE — 80048 BASIC METABOLIC PNL TOTAL CA: CPT | Performed by: FAMILY MEDICINE

## 2024-11-21 PROCEDURE — 82948 REAGENT STRIP/BLOOD GLUCOSE: CPT

## 2024-11-21 PROCEDURE — 97535 SELF CARE MNGMENT TRAINING: CPT

## 2024-11-21 PROCEDURE — 63710000001 INSULIN LISPRO (HUMAN) PER 5 UNITS: Performed by: FAMILY MEDICINE

## 2024-11-21 PROCEDURE — 85027 COMPLETE CBC AUTOMATED: CPT | Performed by: FAMILY MEDICINE

## 2024-11-21 RX ORDER — FAMOTIDINE 10 MG/ML
20 INJECTION, SOLUTION INTRAVENOUS DAILY
Status: DISCONTINUED | OUTPATIENT
Start: 2024-11-22 | End: 2024-12-01

## 2024-11-21 RX ADMIN — Medication 1 TABLET: at 09:14

## 2024-11-21 RX ADMIN — DOCUSATE SODIUM 100 MG: 100 CAPSULE, LIQUID FILLED ORAL at 09:14

## 2024-11-21 RX ADMIN — FAMOTIDINE 20 MG: 10 INJECTION INTRAVENOUS at 09:15

## 2024-11-21 RX ADMIN — ERTAPENEM 1000 MG: 1 INJECTION INTRAMUSCULAR; INTRAVENOUS at 16:04

## 2024-11-21 RX ADMIN — Medication 10 ML: at 20:44

## 2024-11-21 RX ADMIN — ROSUVASTATIN 10 MG: 10 TABLET, FILM COATED ORAL at 09:15

## 2024-11-21 RX ADMIN — INSULIN LISPRO 10 UNITS: 100 INJECTION, SOLUTION INTRAVENOUS; SUBCUTANEOUS at 17:37

## 2024-11-21 RX ADMIN — APIXABAN 5 MG: 5 TABLET, FILM COATED ORAL at 09:14

## 2024-11-21 RX ADMIN — PREGABALIN 75 MG: 75 CAPSULE ORAL at 20:44

## 2024-11-21 RX ADMIN — OXYCODONE HYDROCHLORIDE AND ACETAMINOPHEN 1 TABLET: 5; 325 TABLET ORAL at 21:19

## 2024-11-21 RX ADMIN — Medication 10 ML: at 09:15

## 2024-11-21 RX ADMIN — TAMSULOSIN HYDROCHLORIDE 0.4 MG: 0.4 CAPSULE ORAL at 09:14

## 2024-11-21 RX ADMIN — DOCUSATE SODIUM 100 MG: 100 CAPSULE, LIQUID FILLED ORAL at 20:44

## 2024-11-21 RX ADMIN — OXYCODONE HYDROCHLORIDE AND ACETAMINOPHEN 1 TABLET: 5; 325 TABLET ORAL at 09:14

## 2024-11-21 RX ADMIN — APIXABAN 5 MG: 5 TABLET, FILM COATED ORAL at 20:44

## 2024-11-21 RX ADMIN — BUMETANIDE 1 MG: 1 TABLET ORAL at 09:14

## 2024-11-21 RX ADMIN — LISINOPRIL 5 MG: 10 TABLET ORAL at 09:14

## 2024-11-21 RX ADMIN — DULOXETINE HYDROCHLORIDE 30 MG: 30 CAPSULE, DELAYED RELEASE ORAL at 09:14

## 2024-11-21 RX ADMIN — ONDANSETRON 4 MG: 2 INJECTION INTRAMUSCULAR; INTRAVENOUS at 23:29

## 2024-11-21 RX ADMIN — SODIUM BICARBONATE 650 MG: 650 TABLET ORAL at 09:14

## 2024-11-21 RX ADMIN — DOCUSATE SODIUM 50 MG AND SENNOSIDES 8.6 MG 1 TABLET: 8.6; 5 TABLET, FILM COATED ORAL at 20:44

## 2024-11-21 RX ADMIN — INSULIN LISPRO 4 UNITS: 100 INJECTION, SOLUTION INTRAVENOUS; SUBCUTANEOUS at 17:37

## 2024-11-21 NOTE — THERAPY TREATMENT NOTE
Acute Care - Physical Therapy Treatment Note  [facility]     Patient Name: Erick Luong  : 1955  MRN: [id number]  Today's Date: 2024      Visit Dx:     ICD-10-CM ICD-9-CM   1. Acute UTI (urinary tract infection)  N39.0 599.0   2. Acute renal failure superimposed on chronic kidney disease, unspecified acute renal failure type, unspecified CKD stage  N17.9 584.9    N18.9 585.9   3. Acute hyperglycemia  R73.9 790.29   4. Impaired functional mobility and activity tolerance [Z74.09]  Z74.09 V49.89     Patient Active Problem List   Diagnosis    Obesity, unspecified obesity severity, unspecified obesity type    Essential hypertension    Type 2 diabetes mellitus with hyperglycemia, with long-term current use of insulin    Nonsmoker    Anemia due to chronic kidney disease    Class 3 severe obesity due to excess calories with body mass index (BMI) of 40.0 to 44.9 in adult    Anasarca    Sleep apnea with use of continuous positive airway pressure (CPAP)    Medically noncompliant    Diabetic ulcer of left foot associated with type 2 diabetes mellitus    Diabetic polyneuropathy associated with type 2 diabetes mellitus    Spinal stenosis in cervical region    Degeneration of cervical intervertebral disc    Cervical radiculopathy    Degeneration of lumbar or lumbosacral intervertebral disc    Cervical myelopathy    Bilateral carpal tunnel syndrome    CAD (coronary artery disease)    GERD without esophagitis    BPH without obstruction/lower urinary tract symptoms    Stage 3b chronic kidney disease    Chronic diastolic heart failure    Type 2 myocardial infarction due to heart failure    Left carpal tunnel syndrome    Syncope and collapse, recurrent episodes    Poorly-controlled hypertension    Rhinovirus    Peripheral vascular disease    Chronic kidney disease (CKD), stage IV (severe)    Diabetic foot infection    Sepsis    Epigastric pain    Chronic heart failure with preserved ejection fraction (HFpEF)    Sepsis  due to methicillin resistant Staphylococcus aureus (MRSA) with encephalopathy without septic shock    Slow transit constipation    CHF exacerbation    CHF (congestive heart failure), NYHA class I, acute on chronic, combined    Rectal bleeding    Acute on chronic heart failure with preserved ejection fraction (HFpEF)    DKA, type 2, not at goal    Atrial fibrillation    E. coli UTI, ESBL    Acute UTI (urinary tract infection)    Acute encephalopathy    Type 2 diabetes mellitus with hyperglycemia, with long-term current use of insulin     Past Medical History:   Diagnosis Date    Arthritis     Autonomic disease     CAD (coronary artery disease) 02/06/2017    Cervical radiculopathy 09/16/2021    Chronic constipation with acute exaccerbation 05/10/2021    Coronary artery disease     Degeneration of cervical intervertebral disc 08/11/2021    Diabetes mellitus     Diabetic foot ulcer 08/31/2020    Diabetic polyneuropathy associated with type 2 diabetes mellitus 01/18/2021    Elevated cholesterol     Gastroesophageal reflux disease 05/13/2019    Headache     HTN (hypertension), benign 05/03/2017    Hyperlipidemia     Hypertension     Mixed hyperlipidemia 02/07/2017    Multiple lung nodules 01/26/2020    5mm, 9 mm RLL identified 1/2020, not present 10/2019.    Myocardial infarction     Osteomyelitis 01/22/2020    Osteomyelitis of fifth toe of right foot 10/07/2019    Pancreatitis     Persistent insomnia 01/20/2020    Renal disorder     Sleep apnea 02/06/2017    Sleep apnea with use of continuous positive airway pressure (CPAP)     NON-COMPLIANT    Slow transit constipation 01/16/2019    Spinal stenosis in cervical region 09/16/2021    Vitamin D deficiency 03/02/2021     Past Surgical History:   Procedure Laterality Date    ABDOMINAL SURGERY      AMPUTATION FOOT / TOE Left 10/2021    5th digit     ANTERIOR CERVICAL DISCECTOMY W/ FUSION N/A 08/05/2022    Procedure: CERVICAL DISCECTOMY ANTERIOR WITH FUSION C5-6 with possible  plating of C5-7 with neuromonitoring and 1 c-arm;  Surgeon: Karel Soliz MD;  Location: [facility] OR;  Service: Neurosurgery;  Laterality: N/A;    APPENDECTOMY      BACK SURGERY      CARDIAC CATHETERIZATION Left 02/08/2021    Procedure: Left Heart Cath w poss intervention left anatomical snuff box acess;  Surgeon: Omkar Charles DO;  Location: [facility] CATH INVASIVE LOCATION;  Service: Cardiology;  Laterality: Left;    CARDIAC SURGERY      CATARACT EXTRACTION      CERVICAL SPINE SURGERY      COLONOSCOPY N/A 01/31/2017    Normal exam repeat in 5 years    COLONOSCOPY N/A 02/11/2019    Mild acute inflammation    COLONOSCOPY N/A 04/07/2024    2 areas at 10 and 20 cm with friability ulceration 2 clips placed at 20 cm and 4 clips at 10 cm poor prep normal mucosa,mild eroisions and ulcerations in visible vessels    COLONOSCOPY N/A 7/1/2024    Procedure: COLONOSCOPY WITH ANESTHESIA;  Surgeon: Arsalan Lorenzo DO;  Location: [facility] ENDOSCOPY;  Service: Gastroenterology;  Laterality: N/A;  pre op constipation/diarrhea  post poor prep  pcp Del Shetty    COLONOSCOPY N/A 7/2/2024    Procedure: COLONOSCOPY WITH ANESTHESIA;  Surgeon: Agapito Christopher MD;  Location: [facility] ENDOSCOPY;  Service: Gastroenterology;  Laterality: N/A;  pre rectal bleeding  post poor prep  pcp Del Shetty MD    COLONOSCOPY W/ POLYPECTOMY  03/04/2014    Hyperplastic polyp    CORONARY ARTERY BYPASS GRAFT  10/2015    ENDOSCOPY  04/13/2011    Gastritis with hemorrhage    ENDOSCOPY N/A 05/05/2017    Normal exam    ENDOSCOPY N/A 02/11/2019    Gastritis    ENDOSCOPY N/A 09/01/2020    Non-erosive gastritis with hemorrhage    ENDOSCOPY N/A 02/10/2021    Esophagitis    ENDOSCOPY N/A 04/11/2024    There were esophageal mucosal changes suspicious for short-segment Low's esophagus present in the distal esophagus. The maximum longitudinal extent of these mucosal changes was 2 cm in length. Mucosa was biopsied with a cold forceps for  histologyDistal esophagus, biopsies: Mild chronic active esophagogastritis. No evidence of intestinal metaplasia, dysplasia. Antrum, bx, Mild chronic gastritis    FOOT SURGERY Left     INCISION AND DRAINAGE OF WOUND Left 09/2007    spider bite     PT Assessment (Last 12 Hours)       PT Evaluation and Treatment       Row Name 11/21/24 0828          Physical Therapy Time and Intention    Subjective Information complains of;pain  -AE     Document Type therapy note (daily note)  -AE     Mode of Treatment physical therapy  -AE       Row Name 11/21/24 0828          General Information    Existing Precautions/Restrictions fall  Pt's family has yet to bring in CAM boot so pt is NWB L LE,L LE wound  -AE       Row Name 11/21/24 0828          Pain    Pretreatment Pain Rating 5/10  -AE     Posttreatment Pain Rating 7/10  -AE     Pain Location other (see comments)  foot  -AE     Pain Side/Orientation left;lower  -AE     Response to Pain Interventions activity participation with increased pain  RN notified  -AE       Row Name 11/21/24 0828          Bed Mobility    Supine-Sit [place] (Bed Mobility) standby assist  -AE       Row Name 11/21/24 0828          Sit-Stand Transfer    Sit-Stand [place] (Transfers) contact guard;verbal cues  sit to stand x3  -AE       Row Name 11/21/24 0828          Gait/Stairs (Locomotion)    [place] Level (Gait) minimum assist (75% patient effort)  -AE     Assistive Device (Gait) walker, front-wheeled  -AE     Patient was able to Ambulate --  Steps to chair  -AE     Comment, (Gait/Stairs) Pt had difficulty with NWB L LE   -AE       Row Name 11/21/24 0828          Balance    Static Standing Balance standby assist  -AE     Dynamic Standing Balance contact guard  -AE       Row Name 11/21/24 0828          Motor Skills    Therapeutic Exercise --  L LE ex's x 20 reps in standing position with RW  -AE       Row Name 11/21/24 0828          Knee (Therapeutic Exercise)    Knee (Therapeutic Exercise)  AROM (active range of motion)  -AE     Knee AROM (Therapeutic Exercise) right;LAQ (long arc quad);20 repititions  -AE       Row Name 11/21/24 0828          Ankle (Therapeutic Exercise)    Ankle (Therapeutic Exercise) AROM (active range of motion)  -AE     Ankle AROM (Therapeutic Exercise) dorsiflexion;plantarflexion;10 repetitions  -AE       Row Name             Wound 08/22/23 0140 Left posterior plantar    Wound - Properties Group Placement Date: 08/22/23  -AM Placement Time: 0140  -AM Side: Left  -AM Orientation: posterior  -AM Location: plantar  -AM Present on Original Admission: Y  -AM    Retired Wound - Properties Group Placement Date: 08/22/23  -AM Placement Time: 0140  -AM Present on Original Admission: Y  -AM Side: Left  -AM Orientation: posterior  -AM Location: plantar  -AM    Retired Wound - Properties Group Placement Date: 08/22/23  -AM Placement Time: 0140  -AM Present on Original Admission: Y  -AM Side: Left  -AM Orientation: posterior  -AM Location: plantar  -AM    Retired Wound - Properties Group Date first assessed: 08/22/23  -AM Time first assessed: 0140  -AM Present on Original Admission: Y  -AM Side: Left  -AM Location: plantar  -AM      Row Name             Wound 06/15/23 0102 Left anterior plantar    Wound - Properties Group Placement Date: 06/15/23  -SM Placement Time: 0102  -SM Side: Left  -SM Orientation: anterior  -SM Location: plantar  -SM Removal Date: --  -JR Removal Time: --  -JR    Retired Wound - Properties Group Placement Date: 06/15/23  -SM Placement Time: 0102  -SM Side: Left  -SM Orientation: anterior  -SM Location: plantar  -SM Removal Date: --  -JR Removal Time: --  -JR    Retired Wound - Properties Group Placement Date: 06/15/23  -SM Placement Time: 0102  -SM Side: Left  -SM Orientation: anterior  -SM Location: plantar  -SM Removal Date: --  -JR Removal Time: --  -JR    Retired Wound - Properties Group Date first assessed: 06/15/23  -SM Time first assessed: 0102  -SM Side: Left   -SM Location: plantar  -SM Resolution Date: --  -JR Resolution Time: --  -JR      Row Name             Wound 11/18/24 0046 Left lower arm skin tear;abrasion    Wound - Properties Group Placement Date: 11/18/24  -JR Placement Time: 0046 -JR Side: Left  -JR Orientation: lower  -JR Location: arm  -JR Primary Wound Type: Abrasion  -JR Type: skin tear;abrasion  -JR Present on Original Admission: Y  -JR    Retired Wound - Properties Group Placement Date: 11/18/24  -JR Placement Time: 0046 -JR Present on Original Admission: Y  -JR Side: Left  -JR Orientation: lower  -JR Location: arm  -JR Primary Wound Type: Abrasion  -JR Type: skin tear;abrasion  -JR    Retired Wound - Properties Group Placement Date: 11/18/24  -JR Placement Time: 0046 -JR Present on Original Admission: Y  -JR Side: Left  -JR Orientation: lower  -JR Location: arm  -JR Primary Wound Type: Abrasion  -JR Type: skin tear;abrasion  -JR    Retired Wound - Properties Group Date first assessed: 11/18/24  -JR Time first assessed: 0046 -JR Present on Original Admission: Y  -JR Side: Left  -JR Location: arm  -JR Primary Wound Type: Abrasion  -JR Type: skin tear;abrasion  -JR      Row Name             Wound 11/18/24 0056 Left medial foot diabetic ulcer    Wound - Properties Group Placement Date: 11/18/24  -JR Placement Time: 0056 -JR Side: Left  -JR Orientation: medial  -JR Location: foot  -JR Primary Wound Type: Diabetic ulc  -JR Type: diabetic ulcer  -JR Present on Original Admission: Y  -JR    Retired Wound - Properties Group Placement Date: 11/18/24  -JR Placement Time: 0056 -JR Present on Original Admission: Y  -JR Side: Left  -JR Orientation: medial  -JR Location: foot  -JR Primary Wound Type: Diabetic ulc  -JR Type: diabetic ulcer  -JR    Retired Wound - Properties Group Placement Date: 11/18/24  -JR Placement Time: 0056 -JR Present on Original Admission: Y  -JR Side: Left  -JR Orientation: medial  -JR Location: foot  -JR Primary Wound Type: Diabetic  ulc  -JR Type: diabetic ulcer  -JR    Retired Wound - Properties Group Date first assessed: 11/18/24  -JR Time first assessed: 0056  -JR Present on Original Admission: Y  -JR Side: Left  -JR Location: foot  -JR Primary Wound Type: Diabetic ulc  -JR Type: diabetic ulcer  -JR      Row Name 11/21/24 0828          Positioning and Restraints    Pre-Treatment Position in bed  -AE     Post Treatment Position chair  -AE     In Chair sitting;call light within reach;exit alarm on  -AE               User Key  (r) = Recorded By, (t) = Taken By, (c) = Cosigned By      Initials Name Provider Type    AE Hansa Arambula, PTA Physical Therapist Assistant    Faviola Kunz RN Registered Nurse    Michelle Diaz, RN Registered Nurse    Sabas Young LPNA Licensed Nurse    Sabas Young RN Registered Nurse                    Physical Therapy Education       Title: PT OT SLP Therapies (In Progress)       Topic: Physical Therapy (Not Started)       Point: Mobility training (In Progress)       Learning Progress Summary            Patient Acceptance, E, NR by AE at 11/21/2024 0850    Comment: NWB L LE and need for CAM boot    Acceptance, E, VU by JACIEL at 11/19/2024 0958    Comment: limited gait until boot for RLE    Acceptance, E,TB,D, VU,NR by JE at 11/18/2024 1128    Comment: education re: purpose of PT/importance of activity, safety/falls prevention, NWB L LE due to open wounds, improved tech w/ tfers, positioning w/ L LE elevated                      Point: Home exercise program (In Progress)       Learning Progress Summary            Patient Acceptance, E, NR by AE at 11/21/2024 0850    Comment: NWB L LE and need for CAM boot                      Point: Precautions (Done)       Learning Progress Summary            Patient Acceptance, E,TB,D, VU,NR by JE at 11/18/2024 1128    Comment: education re: purpose of PT/importance of activity, safety/falls prevention, NWB L LE due to open wounds, improved tech w/ tfers,  positioning w/ L LE elevated                                      User Key       Initials Effective Dates Name Provider Type Discipline    AE 02/03/23 -  Hansa Arambula PTA Physical Therapist Assistant PT    JACIEL 02/03/23 -  Sabas Maharaj, PARUL Physical Therapist Assistant PT    NIKO 08/02/18 -  Melly Mustafa, PT Physical Therapist PT                  PT Recommendation and Plan     Progress: improving  Outcome Evaluation: Pt c/o L foot pain 7/10 post PT.Pt was SBA supine to sit and CGA to stand.He was CGA-min x1 to make scoots with R foot and t/f to chair with RW.Pt practiced sit to stand x 3 reps and needed cues for NWB L LE.Pt had more difficulty with NWB L LE today.Pt's CAM boot not in room. PT will continue to progress patient.A new CAM boot may need to be ordered.   Outcome Measures       Row Name 11/21/24 0828 11/19/24 0958          How much help from another person do you currently need...    Turning from your back to your side while in flat bed without using bedrails? 4  -AE 4  -JACIEL     Moving from lying on back to sitting on the side of a flat bed without bedrails? 4  -AE 4  -JACIEL     Moving to and from a bed to a chair (including a wheelchair)? 4  -AE 3  -JACIEL     Standing up from a chair using your arms (e.g., wheelchair, bedside chair)? 4  -AE 3  -JACIEL     Climbing 3-5 steps with a railing? 2  -AE 1  -JACIEL     To walk in hospital room? 2  -AE 2  -JACIEL     AM-PAC 6 Clicks Score (PT) 20  -AE 17  -JACIEL        Functional Assessment    Outcome Measure Options AM-PAC 6 Clicks Basic Mobility (PT)  -AE AM-PAC 6 Clicks Basic Mobility (PT)  -JACIEL               User Key  (r) = Recorded By, (t) = Taken By, (c) = Cosigned By      Initials Name Provider Type    AE Hansa Arambula, PARUL Physical Therapist Assistant    Sabas Ervin, PARUL Physical Therapist Assistant                     Time Calculation:    PT Charges       Row Name 11/21/24 0855             Time Calculation    Start Time 0828  -AE      Stop Time 0851  -AE       Time Calculation (min) 23 min  -AE      PT Received On 11/21/24  -AE      PT Goal Re-Cert Due Date 11/28/24  -AE         Time Calculation- PT    Total Timed Code Minutes- PT 23 minute(s)  -AE         Timed Charges    05496 - PT Therapeutic Exercise Minutes 8  -AE      64900 - PT Therapeutic Activity Minutes 15  -AE         Total Minutes    Timed Charges Total Minutes 23  -AE       Total Minutes 23  -AE                User Key  (r) = Recorded By, (t) = Taken By, (c) = Cosigned By      Initials Name Provider Type    AE Hansa Arambula PTA Physical Therapist Assistant                  Therapy Charges for Today       Code Description Service Date Service Provider Modifiers Qty    07716575189 HC PT THERAPEUTIC ACT EA 15 MIN 11/20/2024 Hansa Arambula, PARUL GP 2    44472373469 HC PT THERAPEUTIC ACT EA 15 MIN 11/21/2024 Hansa Arambula, PARUL GP 1    79388448353 HC PT THER PROC EA 15 MIN 11/21/2024 Hansa Arambula, PTA GP 1            PT G-Codes  Outcome Measure Options: AM-PAC 6 Clicks Basic Mobility (PT)  AM-PAC 6 Clicks Score (PT): 20  AM-PAC 6 Clicks Score (OT): 21    Hansa Arambula PTA  11/21/2024

## 2024-11-21 NOTE — THERAPY TREATMENT NOTE
Patient Name: Erick Luong  : 1955    MRN: [id number]                              Today's Date: 2024       Admit Date: 2024    Visit Dx:     ICD-10-CM ICD-9-CM   1. Acute UTI (urinary tract infection)  N39.0 599.0   2. Acute renal failure superimposed on chronic kidney disease, unspecified acute renal failure type, unspecified CKD stage  N17.9 584.9    N18.9 585.9   3. Acute hyperglycemia  R73.9 790.29   4. Impaired functional mobility and activity tolerance [Z74.09]  Z74.09 V49.89     Patient Active Problem List   Diagnosis    Obesity, unspecified obesity severity, unspecified obesity type    Essential hypertension    Type 2 diabetes mellitus with hyperglycemia, with long-term current use of insulin    Nonsmoker    Anemia due to chronic kidney disease    Class 3 severe obesity due to excess calories with body mass index (BMI) of 40.0 to 44.9 in adult    Anasarca    Sleep apnea with use of continuous positive airway pressure (CPAP)    Medically noncompliant    Diabetic ulcer of left foot associated with type 2 diabetes mellitus    Diabetic polyneuropathy associated with type 2 diabetes mellitus    Spinal stenosis in cervical region    Degeneration of cervical intervertebral disc    Cervical radiculopathy    Degeneration of lumbar or lumbosacral intervertebral disc    Cervical myelopathy    Bilateral carpal tunnel syndrome    CAD (coronary artery disease)    GERD without esophagitis    BPH without obstruction/lower urinary tract symptoms    Stage 3b chronic kidney disease    Chronic diastolic heart failure    Type 2 myocardial infarction due to heart failure    Left carpal tunnel syndrome    Syncope and collapse, recurrent episodes    Poorly-controlled hypertension    Rhinovirus    Peripheral vascular disease    Chronic kidney disease (CKD), stage IV (severe)    Diabetic foot infection    Sepsis    Epigastric pain    Chronic heart failure with preserved ejection fraction (HFpEF)    Sepsis due to  methicillin resistant Staphylococcus aureus (MRSA) with encephalopathy without septic shock    Slow transit constipation    CHF exacerbation    CHF (congestive heart failure), NYHA class I, acute on chronic, combined    Rectal bleeding    Acute on chronic heart failure with preserved ejection fraction (HFpEF)    DKA, type 2, not at goal    Atrial fibrillation    E. coli UTI, ESBL    Acute UTI (urinary tract infection)    Acute encephalopathy    Type 2 diabetes mellitus with hyperglycemia, with long-term current use of insulin     Past Medical History:   Diagnosis Date    Arthritis     Autonomic disease     CAD (coronary artery disease) 02/06/2017    Cervical radiculopathy 09/16/2021    Chronic constipation with acute exaccerbation 05/10/2021    Coronary artery disease     Degeneration of cervical intervertebral disc 08/11/2021    Diabetes mellitus     Diabetic foot ulcer 08/31/2020    Diabetic polyneuropathy associated with type 2 diabetes mellitus 01/18/2021    Elevated cholesterol     Gastroesophageal reflux disease 05/13/2019    Headache     HTN (hypertension), benign 05/03/2017    Hyperlipidemia     Hypertension     Mixed hyperlipidemia 02/07/2017    Multiple lung nodules 01/26/2020    5mm, 9 mm RLL identified 1/2020, not present 10/2019.    Myocardial infarction     Osteomyelitis 01/22/2020    Osteomyelitis of fifth toe of right foot 10/07/2019    Pancreatitis     Persistent insomnia 01/20/2020    Renal disorder     Sleep apnea 02/06/2017    Sleep apnea with use of continuous positive airway pressure (CPAP)     NON-COMPLIANT    Slow transit constipation 01/16/2019    Spinal stenosis in cervical region 09/16/2021    Vitamin D deficiency 03/02/2021     Past Surgical History:   Procedure Laterality Date    ABDOMINAL SURGERY      AMPUTATION FOOT / TOE Left 10/2021    5th digit     ANTERIOR CERVICAL DISCECTOMY W/ FUSION N/A 08/05/2022    Procedure: CERVICAL DISCECTOMY ANTERIOR WITH FUSION C5-6 with possible plating  of C5-7 with neuromonitoring and 1 c-arm;  Surgeon: Karel Soliz MD;  Location: [facility] OR;  Service: Neurosurgery;  Laterality: N/A;    APPENDECTOMY      BACK SURGERY      CARDIAC CATHETERIZATION Left 02/08/2021    Procedure: Left Heart Cath w poss intervention left anatomical snuff box acess;  Surgeon: Omkar Charles DO;  Location: [facility] CATH INVASIVE LOCATION;  Service: Cardiology;  Laterality: Left;    CARDIAC SURGERY      CATARACT EXTRACTION      CERVICAL SPINE SURGERY      COLONOSCOPY N/A 01/31/2017    Normal exam repeat in 5 years    COLONOSCOPY N/A 02/11/2019    Mild acute inflammation    COLONOSCOPY N/A 04/07/2024    2 areas at 10 and 20 cm with friability ulceration 2 clips placed at 20 cm and 4 clips at 10 cm poor prep normal mucosa,mild eroisions and ulcerations in visible vessels    COLONOSCOPY N/A 7/1/2024    Procedure: COLONOSCOPY WITH ANESTHESIA;  Surgeon: Arsalan Lorenzo DO;  Location: [facility] ENDOSCOPY;  Service: Gastroenterology;  Laterality: N/A;  pre op constipation/diarrhea  post poor prep  pcp Del Shetty    COLONOSCOPY N/A 7/2/2024    Procedure: COLONOSCOPY WITH ANESTHESIA;  Surgeon: Agapito Christopher MD;  Location: [facility] ENDOSCOPY;  Service: Gastroenterology;  Laterality: N/A;  pre rectal bleeding  post poor prep  pcp Del Shetty MD    COLONOSCOPY W/ POLYPECTOMY  03/04/2014    Hyperplastic polyp    CORONARY ARTERY BYPASS GRAFT  10/2015    ENDOSCOPY  04/13/2011    Gastritis with hemorrhage    ENDOSCOPY N/A 05/05/2017    Normal exam    ENDOSCOPY N/A 02/11/2019    Gastritis    ENDOSCOPY N/A 09/01/2020    Non-erosive gastritis with hemorrhage    ENDOSCOPY N/A 02/10/2021    Esophagitis    ENDOSCOPY N/A 04/11/2024    There were esophageal mucosal changes suspicious for short-segment Low's esophagus present in the distal esophagus. The maximum longitudinal extent of these mucosal changes was 2 cm in length. Mucosa was biopsied with a cold forceps for histologyDistal  esophagus, biopsies: Mild chronic active esophagogastritis. No evidence of intestinal metaplasia, dysplasia. Antrum, bx, Mild chronic gastritis    FOOT SURGERY Left     INCISION AND DRAINAGE OF WOUND Left 09/2007    spider bite      General Information       Row Name 11/21/24 1142          OT Time and Intention    Subjective Information complains of;pain  -     Document Type therapy note (daily note)  -     Mode of Treatment occupational therapy  -     Patient Effort good  -[facility] Name 11/21/24 1142          General Information    Existing Precautions/Restrictions fall;other (see comments)  cam boot to LLE w/ ambulation  -[facility] Name 11/21/24 1142          Cognition    Orientation Status (Cognition) oriented x 4  -[facility] Name 11/21/24 1142          Safety Issues/Impairments Affecting Functional Mobility    Impairments Affecting Function (Mobility) balance;endurance/activity tolerance;pain;strength;sensation/sensory awareness  -               User Key  (r) = Recorded By, (t) = Taken By, (c) = Cosigned By      Initials Name Provider Type     America Wright, OTR/L Occupational Therapist                     Mobility/ADL's       Row Name 11/21/24 1142          Bed Mobility    Bed Mobility supine-sit  -     Supine-Sit [place] (Bed Mobility) modified independence  -     Assistive Device (Bed Mobility) bed rails;head of bed elevated  -[facility] Name 11/21/24 1142          Transfers    Transfers sit-stand transfer;stand-sit transfer;toilet transfer  -[facility] Name 11/21/24 1142          Sit-Stand Transfer    Sit-Stand [place] (Transfers) standby assist  [facility]     Assistive Device (Sit-Stand Transfers) walker, front-wheeled  -[facility] Name 11/21/24 1142          Stand-Sit Transfer    Stand-Sit [place] (Transfers) standby assist  [facility]     Assistive Device (Stand-Sit Transfers) walker, front-wheeled  -[facility] Name 11/21/24 1142          Toilet Transfer    Type (Toilet  Transfer) sit-stand;stand-sit  -     [place] Level (Toilet Transfer) standby assist  -     Assistive Device (Toilet Transfer) walker, front-wheeled  -KP       Row Name 11/21/24 1142          Functional Mobility    Functional Mobility- Ind. Level standby assist  w/ cam boot to L foot  -     Functional Mobility- Device walker, front-wheeled  -KP       Row Name 11/21/24 1142          Activities of Daily Living    BADL Assessment/Intervention toileting  -       Row Name 11/21/24 1142          Mobility    Left Lower Extremity (Weight-bearing Status) --  has new cam boot  -       Row Name 11/21/24 1142          Toileting Assessment/Training    [place] Level (Toileting) standby assist  -     Assistive Devices (Toileting) grab bar/safety frame  -               User Key  (r) = Recorded By, (t) = Taken By, (c) = Cosigned By      Initials Name Provider Type    America Maciel OTR/L Occupational Therapist                   Obj/Interventions       Row Name 11/21/24 1142          Balance    Balance Assessment sitting static balance;sitting dynamic balance;standing static balance;standing dynamic balance  -     Static Sitting Balance independent  -     Dynamic Sitting Balance independent  -     Position, Sitting Balance unsupported;sitting edge of bed  -     Static Standing Balance standby assist  -     Dynamic Standing Balance standby assist  -     Position/Device Used, Standing Balance supported;walker, front-wheeled  -KP     Balance Interventions sitting;standing;sit to stand;supported;dynamic reaching;occupation based/functional task  -               User Key  (r) = Recorded By, (t) = Taken By, (c) = Cosigned By      Initials Name Provider Type    America Maciel, OTR/L Occupational Therapist                   Goals/Plan    No documentation.                  Clinical Impression       Row Name 11/21/24 1142          Pain Assessment    Pretreatment Pain Rating 7/10  -      Posttreatment Pain Rating 7/10  -     Pain Location abdomen;other (see comments);foot  R rib area  -KP     Pain Side/Orientation left;lower  -KP     Pain Management Interventions exercise or physical activity utilized;positioning techniques utilized  -     Response to Pain Interventions activity participation with tolerable pain  -       Row Name 11/21/24 1142          Plan of Care Review    Plan of Care Reviewed With patient  -KP     Outcome Evaluation OT tx completed. A&O x4, states pain 7/10 through R rib area, lower abdomen, and L foot. New walking boot, dep for donning. Pt completes bed mobility with mod I, sit<>stand and functional mboiltiy with SBA. Toileting with SBA. Stad[place] exercises completed with dynamic reaching to challenge balance, completes with SBA-CGA. Pt left in recliner with call light, educated to call for help. Cont OT POC.  -KP       Row Name 11/21/24 1142          Positioning and Restraints    Pre-Treatment Position in bed  -KP     Post Treatment Position chair  -KP     In Chair notified nsg;sitting;call light within reach;encouraged to call for assist  L walking boot  -               User Key  (r) = Recorded By, (t) = Taken By, (c) = Cosigned By      Initials Name Provider Type    America Maciel, OTR/L Occupational Therapist                   Outcome Measures       Row Name 11/21/24 1142          How much help from another is currently needed...    Putting on and taking off regular lower body clothing? 3  -KP     Bathing (including washing, rinsing, and drying) 3  -KP     Toileting (which includes using toilet bed pan or urinal) 4  -KP     Putting on and taking off regular upper body clothing 4  -KP     Taking care of personal grooming (such as brushing teeth) 4  -KP     Eating meals 4  -KP     AM-PAC 6 Clicks Score (OT) 22  -KP       Row Name 11/21/24 6986          How much help from another person do you currently need...    Turning from your back to your side while in flat bed  without using bedrails? 4  -AE     Moving from lying on back to sitting on the side of a flat bed without bedrails? 4  -AE     Moving to and from a bed to a chair (including a wheelchair)? 4  -AE     Standing up from a chair using your arms (e.g., wheelchair, bedside chair)? 4  -AE     Climbing 3-5 steps with a railing? 2  -AE     To walk in hospital room? 2  -AE     AM-PAC 6 Clicks Score (PT) 20  -AE     Highest Level of Mobility Goal 6 --> Walk 10 steps or more  -AE       Row Name 11/21/24 1142 11/21/24 0828       Functional Assessment    Outcome Measure Options AM-PAC 6 Clicks Daily Activity (OT)  -KP AM-PAC 6 Clicks Basic Mobility (PT)  -AE              User Key  (r) = Recorded By, (t) = Taken By, (c) = Cosigned By      Initials Name Provider Type    AE Hansa Arambula, PTA Physical Therapist Assistant    America Maciel OTR/L Occupational Therapist                    Occupational Therapy Education       Title: PT OT SLP Therapies (In Progress)       Topic: Occupational Therapy (In Progress)       Point: ADL training (Done)       Description:   Instruct learner(s) on proper safety adaptation and remediation techniques during self care or transfers.   Instruct in proper use of assistive devices.                  Learning Progress Summary            Patient Acceptance, E, VU by  at 11/21/2024 1202    Acceptance, E, VU,NR by BLAYNE at 11/20/2024 0825                      Point: Home exercise program (Not Started)       Description:   Instruct learner(s) on appropriate technique for monitoring, assisting and/or progressing therapeutic exercises/activities.                  Learner Progress:  Not documented in this visit.              Point: Precautions (Done)       Description:   Instruct learner(s) on prescribed precautions during self-care and functional transfers.                  Learning Progress Summary            Patient Acceptance, E, VU by  at 11/21/2024 1202    Acceptance, E, VU,NR by BLAYNE at 11/20/2024  0825                      Point: Body mechanics (Done)       Description:   Instruct learner(s) on proper positioning and spine alignment during self-care, functional mobility activities and/or exercises.                  Learning Progress Summary            Patient Acceptance, E, VU by  at 11/21/2024 1202    Acceptance, E, VU,NR by  at 11/20/2024 0825                                      User Key       Initials Effective Dates Name Provider Type Discipline     06/20/22 -  Alesha Barrett OTR/L Occupational Therapist OT     10/08/24 -  America Wright OTR/L Occupational Therapist OT                  OT Recommendation and Plan     Plan of Care Review  Plan of Care Reviewed With: patient  Outcome Evaluation: OT tx completed. A&O x4, states pain 7/10 through R rib area, lower abdomen, and L foot. New walking boot, dep for donning. Pt completes bed mobility with mod I, sit<>stand and functional mboiltiy with SBA. Toileting with SBA. Stad[place] exercises completed with dynamic reaching to challenge balance, completes with SBA-CGA. Pt left in recliner with call light, educated to call for help. Cont OT POC.     Time Calculation:         Time Calculation- OT       Row Name 11/21/24 1202             Time Calculation- OT    OT Start Time 1123  -      OT Stop Time 1152  -KP      OT Time Calculation (min) 29 min  -      Total Timed Code Minutes- OT 29 minute(s)  -KP      OT Received On 11/21/24  -         Timed Charges    37428 - OT Therapeutic Activity Minutes 15  -KP      19917 - OT Self Care/Mgmt Minutes 14  -KP         Total Minutes    Timed Charges Total Minutes 29  -KP       Total Minutes 29  -KP                User Key  (r) = Recorded By, (t) = Taken By, (c) = Cosigned By      Initials Name Provider Type     America Wright OTR/L Occupational Therapist                  Therapy Charges for Today       Code Description Service Date Service Provider Modifiers Qty    [id number]  OT THERAPEUTIC ACT EA 15 MIN  11/21/2024 America Wright OTR/L GO 1    [id number] HC OT SELF CARE/MGMT/TRAIN EA 15 MIN 11/21/2024 America Wright OTR/L GO 1                 America Wright OTR/EDVIN  11/21/2024

## 2024-11-21 NOTE — PROGRESS NOTES
[facility] Medicine Services  INPATIENT PROGRESS NOTE    Patient Name: Erick Luong  Date of Admission: 11/17/2024  Today's Date: 11/21/24  Length of Stay: 2  Primary Care Physician: Del Shetty MD    Subjective   Chief Complaint: UTI/failure to thrive/foot wound.   HPI   Blood pressure stable, afebrile.Nausea is better per patient.  Creatinine slight increase.  White blood cells normal.  Hemoglobin stable.  Bumex is held by nephrology    Review of Systems   Constitutional:  Positive for activity change, appetite change and fatigue. Negative for chills and fever.   HENT:  Negative for hearing loss, nosebleeds, tinnitus and trouble swallowing.    Eyes:  Negative for visual disturbance.   Respiratory:  Negative for cough, chest tightness, shortness of breath and wheezing.    Cardiovascular:  Negative for chest pain, palpitations and leg swelling.   Gastrointestinal:  Negative for abdominal distention, abdominal pain, blood in stool, constipation, diarrhea, nausea and vomiting.   Endocrine: Negative for cold intolerance, heat intolerance, polydipsia, polyphagia and polyuria.   Genitourinary:  Negative for decreased urine volume, difficulty urinating, dysuria, flank pain, frequency and hematuria.   Musculoskeletal:  Positive for arthralgias, gait problem and myalgias. Negative for joint swelling.   Skin:  Negative for rash.   Allergic/Immunologic: Negative for immunocompromised state.   Neurological:  Positive for weakness. Negative for dizziness, syncope, light-headedness and headaches.   Hematological:  Negative for adenopathy. Does not bruise/bleed easily.   Psychiatric/Behavioral:  Negative for confusion and sleep disturbance. The patient is not nervous/anxious.    All pertinent negatives and positives are as above. All other systems have been reviewed and are negative unless otherwise stated.     Objective    Temp:  [97.4 °F (36.3 °C)-97.7 °F (36.5 °C)] 97.7 °F (36.5  °C)  Heart Rate:  [58-86] 86  Resp:  [16] 16  BP: (109-151)/(57-71) 109/71  Physical Exam  Vitals and nursing note reviewed.   Constitutional:       Comments: Chronically ill.   HENT:      Head: Normocephalic.   Eyes:      Conjunctiva/sclera: Conjunctivae normal.      Pupils: Pupils are equal, round, and reactive to light.   Cardiovascular:      Rate and Rhythm: Heart rates in 80s.. Rhythm irregular.      Heart sounds: Normal heart sounds.   Pulmonary:      Effort: No respiratory distress.   Abdominal:      General: Bowel sounds are normal. There is no distension.      Palpations: Abdomen is soft.      Tenderness: There is no abdominal tenderness.      Comments: Obesity.   Musculoskeletal:         General: No swelling.      Cervical back: Neck supple.   Skin:     General: Skin is warm and dry.      Capillary Refill: Capillary refill takes 2 to 3 seconds.      Findings: No rash.      Comments: Left foot wound, dressing in place.   Neurological:      Mental Status: He is alert and oriented to person, place, and time.      Motor: Weakness present.      Coordination: Coordination abnormal.      Gait: Gait abnormal.   Psychiatric:         Mood and Affect: Mood normal.         Behavior: Behavior normal.         Thought Content: Thought content normal.             Results Review:  I have reviewed the labs, radiology results, and diagnostic studies.    Laboratory Data:   Results from last 7 days   Lab Units 11/21/24  0359 11/20/24 0645 11/19/24 0412   WBC 10*3/mm3 6.58 5.94 7.37   HEMOGLOBIN g/dL 10.7* 10.6* 9.8*   HEMATOCRIT % 33.0* 33.9* 31.0*   PLATELETS 10*3/mm3 262 256 233        Results from last 7 days   Lab Units 11/21/24  0359 11/20/24  0606 11/19/24  0412 11/18/24  0416 11/17/24  2153   SODIUM mmol/L 140 141 142   < > 138   POTASSIUM mmol/L 4.6 4.6 5.0   < > 4.4   CHLORIDE mmol/L 105 106 111*   < > 106   CO2 mmol/L 24.0 19.0* 21.0*   < > 19.0*   BUN mg/dL 40* 43* 45*   < > 53*   CREATININE mg/dL 2.51* 2.44*  2.60*   < > 2.83*   CALCIUM mg/dL 8.8 8.6 8.5*   < > 8.6   BILIRUBIN mg/dL  --   --   --   --  0.3   ALK PHOS U/L  --   --   --   --  134*   ALT (SGPT) U/L  --   --   --   --  19   AST (SGOT) U/L  --   --   --   --  13   GLUCOSE mg/dL 91 115* 148*   < > 348*    < > = values in this interval not displayed.       Culture Data:   Blood Culture   Date Value Ref Range Status   11/18/2024 No growth at 3 days  Preliminary   11/17/2024 No growth at 3 days  Preliminary     Urine Culture   Date Value Ref Range Status   11/17/2024 >100,000 CFU/mL Escherichia coli ESBL (A)  Final       Radiology Data:   Imaging Results (Last 24 Hours)       ** No results found for the last 24 hours. **            I have reviewed the patient's current medications.     Assessment/Plan   Assessment  Active Hospital Problems    Diagnosis     **Acute UTI (urinary tract infection)     Acute encephalopathy     Type 2 diabetes mellitus with hyperglycemia, with long-term current use of insulin     Atrial fibrillation     Chronic heart failure with preserved ejection fraction (HFpEF)     Stage 3b chronic kidney disease     CAD (coronary artery disease)     BPH without obstruction/lower urinary tract symptoms     Diabetic ulcer of left foot associated with type 2 diabetes mellitus     Sleep apnea with use of continuous positive airway pressure (CPAP)     Essential hypertension        HPI . 69-year-old male with past medical history of coronary artery disease, CABG, Afib, diabetes mellitus insulin-dependent, diabetic foot ulcer, hypertension, sleep apnea noncompliant with CPAP, right seventh rib fracture on 10/20/2024, presents emergency room with complaints of mental status changes disorientation suprapubic pain and burning with urination.  He appears ill and debilitated, although he is afebrile.  Blood work shows normal white blood cell count UA is consistent with UTI.  He has had a recent UTI with ESBL.      Treatment Plan     UTI . UTI urine  culture-ESBL E. coli.  Normal saline 50 cc an hour.  Invanz antibiotics.     Nausea/vomiting this morning.  Pepcid IV.  Zofran as needed.  Tums as needed.     Acute on chronic stage IIIb renal failure/metabolic acidosis.  Metabolic acidosis resolved..  Nephrology consult.  Baseline creatinine 8/4/2024 1.6.   Creatinine slight increase..    Ultrasound the kidneys-Simple 4.5 cm right mid renal cyst- Otherwise normal sonographic  appearance of the kidneys, Distended normal-appearing bladder.     Acute encephalopathy due to UTI.  Patient is oriented x 3.     Recent rib fracture and chronic pain . 7 right ribs fracture 10/20/2024.  Percocet as needed.  Start Colace.     Diabetes mellitus type 2 insulin-dependent with hyperglycemia.  Hemoglobin A1c 13.4.  Insulin regular IV given in the ER will recheck  Continue Lantus 20 units nightly  Humalog 10 units Premeal 3 times daily and Humalog moderate dose sliding scale ACHS  Hypoglycemia protocol . Uncontrolled diabetes discussed with patient.  This to be a chronic management through his primary care physician, discussed the patient.     Diabetic foot ulcers left foot.  Patient follow-up with wound care outpatient Dr. Mireya Hobbs, per patient.  Consult wound care.  See wound image in media section of chart, the wound appears clean with good margins.  Continue local care and podiatry follow-up outpatient     Atrial fibrillation/CAD/chronic diastolic congestive heart failure/CABG/hypertension/hyperlipidemia.  Continue Eliquis 5 mg p.o. twice daily.  Hold Bumex by nephrology.  Lisinopril . Crestor.  Coreg was discontinued during the last hospitalization in August he does not list any other rate controlling medications.  Echocardiogram 4/2/2024-61 - 65%, mild concentric hypertrophy, left ventricular diastolic function is consistent with (grade III w/high LAP), Mildly reduced right ventricular systolic function, left atrial cavity is mildly dilated, mild calcification of the  aortic valve, No aortic valve regurgitation is present, No hemodynamically significant aortic valve stenosis is present.     Obstructive sleep apnea patient noncompliant with CPAP  Patient is on room air.     Anemia.  Hemoglobin stable..  No sign of acute bleed.     DVT prophylaxis patient anticoagulated     Dementia.  Hold Aricept.     Neuropathy.  Lyrica nightly.     Constipation.  Sarah-Colace.     Nausea . Zofran as needed.     Depression/anxiety.  Cut back Cymbalta.    BuSpar as needed.     Prostate hypertrophy.  Flomax.     Nutrition.  Cardiac/diabetic diet.  Hold Vitamin C.  Hold zinc for now.     Deconditioning.  Fall precaution.  PT OT consult.     Blood culture-no growth for 2 days..  Urine culture-E. coli ESBL.  Respiratory panel-negative.     Medical Decision Making  Number and Complexity of problems: UTI/altered mental//diabetes/CAD/left foot wound  Differential Diagnosis: None     Conditions and Status        Condition is unchanged.     [facility] Data  External documents reviewed: None.  Cardiac tracing (EKG, telemetry) interpretation: Irregular, rates 59  Radiology interpretation: Echo  Labs reviewed: Laboratory  Any tests that were considered but not ordered: Lab in a.m.     Decision rules/scores evaluated (example [id number], Wells, etc): None     Discussed with: Patient     Care Planning  Shared decision making: Patient  Code status and discussions: Full code.     Disposition  Social Determinants of Health that impact treatment or disposition: From home.  1 to 5 days        Electronically signed by Telly Rodriguez MD, 11/21/24, 14:24 CST.

## 2024-11-21 NOTE — PLAN OF CARE
Goal Outcome Evaluation:  Plan of Care Reviewed With: patient        Progress: improving  Outcome Evaluation: patient stable. resting well. voiding per urinal at bedside. c/o pain, see mar. refused insulin. wound care per orders. call light within reach and safety maintained

## 2024-11-21 NOTE — PROGRESS NOTES
"Pharmacy Renal Dose Conversion    Erick Luong is a 69 y.o. year old male  182.9 cm (72\") 118 kg (259 lb 3.2 oz)    Estimated Creatinine Clearance: 37.4 mL/min (A) (by C-G formula based on SCr of 2.51 mg/dL (H)).   Creatinine   Date Value Ref Range Status   11/21/2024 2.51 (H) 0.76 - 1.27 mg/dL Final   11/20/2024 2.44 (H) 0.76 - 1.27 mg/dL Final   11/19/2024 2.60 (H) 0.76 - 1.27 mg/dL Final   01/03/2022 2.6 (H) 0.5 - 1.2 mg/dL Final   07/21/2021 2.30 (H) 0.60 - 1.30 mg/dL Final     Comment:     Serial Number: [id number]:  [id number]       PLAN  Based on prescribing information provided by the drug , Famotidine 20mg iv Q12H,  has been changed to Famotidine 20mg iv daily.    Adjusted per the directives and guidelines established by [facility] Therapeutics Committee and [facility] Medical Executive Committee.  Pharmacy will continue to monitor daily and make further adjustment(s) accordingly.    Santana Crum, PharmD  11/21/2414:19 CST    "

## 2024-11-21 NOTE — PLAN OF CARE
Goal Outcome Evaluation:  Plan of Care Reviewed With: patient        Progress: improving  Outcome Evaluation: Pt c/o L foot pain 7/10 post PT.Pt was SBA supine to sit and CGA to stand.He was CGA-min x1 to make scoots with R foot and t/f to chair with RW.Pt practiced sit to stand x 3 reps and needed cues for NWB L LE.Pt had more difficulty with NWB L LE today.Pt's CAM boot not in room. PT will continue to progress patient.A new CAM boot may need to be ordered.

## 2024-11-21 NOTE — PROGRESS NOTES
Nephrology ([facility]) Progress Note      Patient:  Erick Luong  YOB: 1955  Date of Service: 11/21/2024  MRN: [id number]   Acct: [id number]   Primary Care Physician: Del Shetty MD  Advance Directive:   Code Status and Medical Interventions: CPR (Attempt to Resuscitate); Full Support   Ordered at: 11/18/24 0159     Code Status (Patient has no pulse and is not breathing):    CPR (Attempt to Resuscitate)     Medical Interventions (Patient has pulse or is breathing):    Full Support     Admit Date: 11/17/2024       Hospital Day: 2  Referring Provider: Garrett Alicia,*      Patient personally seen and examined.  Complete chart including Consults, Notes, Operative Reports, Labs, Cardiology, and Radiology studies reviewed as able.        Subjective:  Erick Luong is a 69 y.o. male for whom we were consulted for evaluation and treatment of acute kidney injury. Baseline chronic kidney disease stage 3b, baseline creatinine approximately 1.5-1.8.  Follows in our office. History of uncontrolled diabetes, hypertension, coronary artery disease, diabetic foot ulcer, obesity. Admitted with cystitis, foot wound and failure to thrive. Minimal PO intake recently. Had no specific complaints at time of nephrology initial exam. Hospital course remarkable for administration of IV fluids and reduction of Bumex dose.    Today is awake and alert. No new complaint or overnight issues. Urine output nonoliguric     Allergies:  Cefepime, Bactrim [sulfamethoxazole-trimethoprim], Vancomycin, Zolpidem, and Metronidazole    Home Meds:  Medications Prior to Admission   Medication Sig Dispense Refill Last Dose/Taking    apixaban (Eliquis) 5 MG tablet tablet Take 1 tablet by mouth 2 (Two) Times a Day. 60 tablet 0 Taking    ascorbic acid (VITAMIN C) 1000 MG tablet Take 1 tablet by mouth Daily. 30 tablet 3 Taking    bumetanide (BUMEX) 2 MG tablet Take 1 tablet by mouth 2 (Two) Times a Day. 6  tablet 3 Taking    busPIRone (BUSPAR) 10 MG tablet Take 1 tablet by mouth 3 (Three) Times a Day As Needed. (Patient taking differently: Take 1 tablet by mouth 3 (Three) Times a Day As Needed (anxiety).) 270 tablet 4 Taking Differently    donepezil (ARICEPT) 10 MG tablet Take 1 tablet by mouth every night at bedtime. 30 tablet 0 Taking    DULoxetine (CYMBALTA) 60 MG capsule Take 1 capsule by mouth Daily. 30 capsule 0 Taking    famotidine (PEPCID) 20 MG tablet Take 1 tablet by mouth 2 (Two) Times a Day. 60 tablet 0 Taking    insulin glargine (Lantus) 100 UNIT/ML injection Inject 35 Units under the skin into the appropriate area as directed every night at bedtime. 10 mL 0 Taking    lisinopril (PRINIVIL,ZESTRIL) 5 MG tablet Take 1 tablet by mouth Daily. 30 tablet 0 Taking    melatonin 3 MG tablet Take 2 tablets by mouth At Night As Needed for Sleep. 30 tablet 0 Taking As Needed    midodrine (PROAMATINE) 2.5 MG tablet Take 1 tablet by mouth 2 (Two) Times a Day. 60 tablet 5 Taking    oxyCODONE (ROXICODONE) 10 MG tablet Take 1 tablet by mouth 2 (Two) Times a Day As Needed. (Patient taking differently: Take 1 tablet by mouth 2 (Two) Times a Day As Needed for Moderate Pain or Severe Pain. *must last 30 days*) 55 tablet 0 Taking Differently    pantoprazole (PROTONIX) 40 MG EC tablet Take 1 tablet by mouth 2 (Two) Times a Day. 60 tablet 0 Taking    polyethylene glycol (MIRALAX) 17 GM/SCOOP powder Take 17 g by mouth Daily As Needed (constipation).   Taking As Needed    potassium chloride ER (K-TAB) 20 MEQ tablet controlled-release ER tablet Take 1 tablet by mouth Daily. 30 tablet 0 Taking    rosuvastatin (CRESTOR) 10 MG tablet Take 1 tablet by mouth Daily. 30 tablet 0 Taking    sennosides-docusate (PERICOLACE) 8.6-50 MG per tablet Take 1 tablet by mouth Every Night. Obtain OTC   Taking    tamsulosin (FLOMAX) 0.4 MG capsule 24 hr capsule Take 1 capsule by mouth Daily. 90 capsule 4 Taking    Insulin Lispro (humaLOG) 100 UNIT/ML  injection Inject 2-12 Units under the skin into the appropriate area as directed 4 (Four) Times a Day Before Meals & at Bedtime. Inject subcutaneously four times a day per sliding scale:  0-150 = 0 units; 151-200 = 2u; 201-250 = 4u; 251-300 = 6u; 301-350 = 8u; 351-400 = 10u; 401+ = 12u       nitroglycerin (NITROSTAT) 0.4 MG SL tablet Place 1 tablet under the tongue Every 5 (Five) Minutes As Needed for Chest Pain. Take no more than 3 doses in 15 minutes.       pregabalin (LYRICA) 100 MG capsule Take 1 capsule by mouth Daily.       pregabalin (LYRICA) 100 MG capsule Take 2 capsules by mouth Every Night.       sodium hypochlorite (DAKIN'S 1/4 STRENGTH) 0.125 % solution topical solution 0.125% Apply  topically to the appropriate area as directed 2 (Two) Times a Day. 473 mL 5        Medicines:  Current Facility-Administered Medications   Medication Dose Route Frequency Provider Last Rate Last Admin    acetaminophen (TYLENOL) tablet 650 mg  650 mg Oral Q4H PRN Garrett Alicia MD        Or    acetaminophen (TYLENOL) 160 MG/5ML oral solution 650 mg  650 mg Oral Q4H PRN Garrett Alicia MD        Or    acetaminophen (TYLENOL) suppository 650 mg  650 mg Rectal Q4H PRN Garrett Alicia MD        apixaban (ELIQUIS) tablet 5 mg  5 mg Oral BID Garrett Alicia MD   5 mg at 11/20/24 2055    [Held by provider] ascorbic acid (VITAMIN C) tablet 500 mg  500 mg Oral BID Telly Rodriguez MD   500 mg at 11/20/24 0837    sennosides-docusate (PERICOLACE) 8.6-50 MG per tablet 2 tablet  2 tablet Oral BID PRN Garrett Alicia MD        And    polyethylene glycol (MIRALAX) packet 17 g  17 g Oral Daily PRN Garrett Alicia MD        And    bisacodyl (DULCOLAX) EC tablet 5 mg  5 mg Oral Daily PRN Garrett Alicia MD        And    bisacodyl (DULCOLAX) suppository 10 mg  10 mg Rectal Daily PRN Garrett Alicia MD        bumetanide (BUMEX) tablet 1 mg  1 mg Oral BID Telly Rodriguez MD    1 mg at 11/20/24 1736    busPIRone (BUSPAR) tablet 10 mg  10 mg Oral TID PRN Garrett Alicia MD   10 mg at 11/20/24 2055    calcium carbonate (TUMS) chewable tablet 500 mg (200 mg elemental)  2 tablet Oral TID PRN Telly Rodriguez MD   2 tablet at 11/20/24 1459    Calcium Replacement - Follow Nurse / BPA Driven Protocol   Not Applicable PRN Telly Rodriguez MD        dextrose (D50W) (25 g/50 mL) IV injection 25 g  25 g Intravenous Q15 Min PRN Garrett Alicia MD        dextrose (GLUTOSE) oral gel 15 g  15 g Oral Q15 Min PRN Garrett Alicia MD   15 g at 11/19/24 0040    docusate sodium (COLACE) capsule 100 mg  100 mg Oral BID Telly Rodriguez MD   100 mg at 11/20/24 2056    [Held by provider] donepezil (ARICEPT) tablet 10 mg  10 mg Oral Nightly Garrett Alicia MD   10 mg at 11/18/24 0219    DULoxetine (CYMBALTA) DR capsule 30 mg  30 mg Oral Daily Telly Rodriguez MD   30 mg at 11/20/24 0837    ertapenem (INVanz) 1,000 mg in sodium chloride 0.9 % 100 mL MBP  1,000 mg Intravenous Q24H Telly Rodriguez  mL/hr at 11/20/24 1436 1,000 mg at 11/20/24 1436    famotidine (PEPCID) injection 20 mg  20 mg Intravenous Q12H Telly Rodriguez MD        glucagon (GLUCAGEN) injection 1 mg  1 mg Intramuscular Q15 Min PRN Garrett Alicia MD        insulin glargine (LANTUS, SEMGLEE) injection 20 Units  20 Units Subcutaneous Nightly Garrett Alicia MD   20 Units at 11/18/24 2143    Insulin Lispro (humaLOG) injection 10 Units  10 Units Subcutaneous TID With Meals Garrett Alicia MD   10 Units at 11/20/24 1736    Insulin Lispro (humaLOG) injection 2-9 Units  2-9 Units Subcutaneous 4x Daily AC & at Bedtime Garrett Alicia MD   2 Units at 11/18/24 2143    lisinopril (PRINIVIL,ZESTRIL) tablet 5 mg  5 mg Oral Daily Garrett Alicia MD   5 mg at 11/20/24 0836    Magnesium Standard Dose Replacement - Follow Nurse / BPA Driven Protocol   Not Applicable Telly Rios  MD LASHAWN        multivitamin with minerals 1 tablet  1 tablet Oral Daily Telly Rodriguez MD   1 tablet at 11/20/24 0837    nitroglycerin (NITROSTAT) SL tablet 0.4 mg  0.4 mg Sublingual Q5 Min PRN Garrett Alicia MD        ondansetron (ZOFRAN) injection 4 mg  4 mg Intravenous Q6H PRN Garrett Alicia MD   4 mg at 11/20/24 0915    oxyCODONE-acetaminophen (PERCOCET) 5-325 MG per tablet 1 tablet  1 tablet Oral Q4H PRN Telly Rodriguez MD   1 tablet at 11/20/24 2056    Phosphorus Replacement - Follow Nurse / BPA Driven Protocol   Not Applicable PRN Telly Rodriguez MD        Potassium Replacement - Follow Nurse / BPA Driven Protocol   Not Applicable PRN Telly Rodriguez MD        pregabalin (LYRICA) capsule 75 mg  75 mg Oral Nightly Telly Rodriguez MD   75 mg at 11/20/24 2056    prochlorperazine (COMPAZINE) injection 5 mg  5 mg Intravenous Q6H PRN Telly Rodriguez MD   5 mg at 11/20/24 1456    rosuvastatin (CRESTOR) tablet 10 mg  10 mg Oral Daily Garrett Alicia MD   10 mg at 11/20/24 0837    sennosides-docusate (PERICOLACE) 8.6-50 MG per tablet 1 tablet  1 tablet Oral Nightly Garrett Alicia MD   1 tablet at 11/20/24 2056    sodium bicarbonate tablet 650 mg  650 mg Oral BID Telly Rodriguez MD   650 mg at 11/20/24 2056    sodium chloride 0.9 % flush 10 mL  10 mL Intravenous Q12H Garrett Alicia MD   10 mL at 11/20/24 2056    sodium chloride 0.9 % flush 10 mL  10 mL Intravenous PRN Garrett Alicia MD        sodium chloride 0.9 % infusion 40 mL  40 mL Intravenous PRN Garrett Alicia MD        tamsulosin (FLOMAX) 24 hr capsule 0.4 mg  0.4 mg Oral Daily Garrett Alicia MD   0.4 mg at 11/20/24 0837    [Held by provider] zinc sulfate (ZINCATE) capsule 220 mg  220 mg Oral Daily Telly Rodriguez MD   220 mg at 11/20/24 0836       Past Medical History:  Past Medical History:   Diagnosis Date    Arthritis     Autonomic disease     CAD (coronary artery disease)  02/06/2017    Cervical radiculopathy 09/16/2021    Chronic constipation with acute exaccerbation 05/10/2021    Coronary artery disease     Degeneration of cervical intervertebral disc 08/11/2021    Diabetes mellitus     Diabetic foot ulcer 08/31/2020    Diabetic polyneuropathy associated with type 2 diabetes mellitus 01/18/2021    Elevated cholesterol     Gastroesophageal reflux disease 05/13/2019    Headache     HTN (hypertension), benign 05/03/2017    Hyperlipidemia     Hypertension     Mixed hyperlipidemia 02/07/2017    Multiple lung nodules 01/26/2020    5mm, 9 mm RLL identified 1/2020, not present 10/2019.    Myocardial infarction     Osteomyelitis 01/22/2020    Osteomyelitis of fifth toe of right foot 10/07/2019    Pancreatitis     Persistent insomnia 01/20/2020    Renal disorder     Sleep apnea 02/06/2017    Sleep apnea with use of continuous positive airway pressure (CPAP)     NON-COMPLIANT    Slow transit constipation 01/16/2019    Spinal stenosis in cervical region 09/16/2021    Vitamin D deficiency 03/02/2021       Past Surgical History:  Past Surgical History:   Procedure Laterality Date    ABDOMINAL SURGERY      AMPUTATION FOOT / TOE Left 10/2021    5th digit     ANTERIOR CERVICAL DISCECTOMY W/ FUSION N/A 08/05/2022    Procedure: CERVICAL DISCECTOMY ANTERIOR WITH FUSION C5-6 with possible plating of C5-7 with neuromonitoring and 1 c-arm;  Surgeon: Karel Soliz MD;  Location:  [facility] OR;  Service: Neurosurgery;  Laterality: N/A;    APPENDECTOMY      BACK SURGERY      CARDIAC CATHETERIZATION Left 02/08/2021    Procedure: Left Heart Cath w poss intervention left anatomical snuff box acess;  Surgeon: Omkar Charles DO;  Location:  [facility] CATH INVASIVE LOCATION;  Service: Cardiology;  Laterality: Left;    CARDIAC SURGERY      CATARACT EXTRACTION      CERVICAL SPINE SURGERY      COLONOSCOPY N/A 01/31/2017    Normal exam repeat in 5 years    COLONOSCOPY N/A 02/11/2019    Mild acute inflammation     COLONOSCOPY N/A 04/07/2024    2 areas at 10 and 20 cm with friability ulceration 2 clips placed at 20 cm and 4 clips at 10 cm poor prep normal mucosa,mild eroisions and ulcerations in visible vessels    COLONOSCOPY N/A 7/1/2024    Procedure: COLONOSCOPY WITH ANESTHESIA;  Surgeon: Arsalan Lorenzo DO;  Location: [facility] ENDOSCOPY;  Service: Gastroenterology;  Laterality: N/A;  pre op constipation/diarrhea  post poor prep  pcp Del Shetty    COLONOSCOPY N/A 7/2/2024    Procedure: COLONOSCOPY WITH ANESTHESIA;  Surgeon: Agapito Christopher MD;  Location: [facility] ENDOSCOPY;  Service: Gastroenterology;  Laterality: N/A;  pre rectal bleeding  post poor prep  pcp Del Shetty MD    COLONOSCOPY W/ POLYPECTOMY  03/04/2014    Hyperplastic polyp    CORONARY ARTERY BYPASS GRAFT  10/2015    ENDOSCOPY  04/13/2011    Gastritis with hemorrhage    ENDOSCOPY N/A 05/05/2017    Normal exam    ENDOSCOPY N/A 02/11/2019    Gastritis    ENDOSCOPY N/A 09/01/2020    Non-erosive gastritis with hemorrhage    ENDOSCOPY N/A 02/10/2021    Esophagitis    ENDOSCOPY N/A 04/11/2024    There were esophageal mucosal changes suspicious for short-segment Low's esophagus present in the distal esophagus. The maximum longitudinal extent of these mucosal changes was 2 cm in length. Mucosa was biopsied with a cold forceps for histologyDistal esophagus, biopsies: Mild chronic active esophagogastritis. No evidence of intestinal metaplasia, dysplasia. Antrum, bx, Mild chronic gastritis    FOOT SURGERY Left     INCISION AND DRAINAGE OF WOUND Left 09/2007    spider bite       Family History  Family History   Problem Relation Age of Onset    Colon cancer Father     Heart disease Father     Colon cancer Sister     Colon polyps Sister     Alzheimer's disease Mother     Coronary artery disease Sister     Coronary artery disease Sister        Social History  Social History     Socioeconomic History    Marital status:    Tobacco Use    Smoking status:  Former     Current packs/day: 0.00     Types: Cigarettes     Quit date:      Years since quittin.9    Smokeless tobacco: Never    Tobacco comments:     smoked in highschool   Vaping Use    Vaping status: Never Used   Substance and Sexual Activity    Alcohol use: No    Drug use: No    Sexual activity: Defer       Review of Systems:  History obtained from chart review and the patient  General ROS: No fever or chills  Respiratory ROS: No cough, shortness of breath, wheezing  Cardiovascular ROS: No chest pain or palpitations  Gastrointestinal ROS: No abdominal pain or melena  Genito-Urinary ROS: No dysuria or hematuria  Psych ROS: No anxiety and depression  14 point ROS reviewed with the patient and negative except as noted above and in the HPI unless unable to obtain.    Objective:  Patient Vitals for the past 24 hrs:   BP Temp Temp src Pulse Resp SpO2   24 0826 123/66 97.5 °F (36.4 °C) Oral 69 16 98 %   24 0347 122/66 97.6 °F (36.4 °C) Oral 62 16 98 %   24 1922 116/57 97.5 °F (36.4 °C) Oral 58 16 99 %   24 1632 151/69 97.4 °F (36.3 °C) Oral 67 16 100 %   24 1212 108/55 97.3 °F (36.3 °C) Oral 62 16 100 %       Intake/Output Summary (Last 24 hours) at 2024 0859  Last data filed at 2024 0826  Gross per 24 hour   Intake 360 ml   Output 3150 ml   Net -2790 ml     General: awake/alert   Chest:  clear to auscultation bilaterally without respiratory distress  CVS: regular rate and rhythm  Abdominal: soft, nontender, positive bowel sounds  Extremities: no cyanosis or edema  Skin: warm and dry without rash      Labs:  Results from last 7 days   Lab Units 24  0359 24  0645 24  0412   WBC 10*3/mm3 6.58 5.94 7.37   HEMOGLOBIN g/dL 10.7* 10.6* 9.8*   HEMATOCRIT % 33.0* 33.9* 31.0*   PLATELETS 10*3/mm3 262 256 233         Results from last 7 days   Lab Units 24  0359 24  0606 24  0412 24  0416 24  2153   SODIUM mmol/L 140 141 142   < >  138   POTASSIUM mmol/L 4.6 4.6 5.0   < > 4.4   CHLORIDE mmol/L 105 106 111*   < > 106   CO2 mmol/L 24.0 19.0* 21.0*   < > 19.0*   BUN mg/dL 40* 43* 45*   < > 53*   CREATININE mg/dL 2.51* 2.44* 2.60*   < > 2.83*   CALCIUM mg/dL 8.8 8.6 8.5*   < > 8.6   EGFR mL/min/1.73 27.2* 28.1* 26.0*   < > 23.5*   BILIRUBIN mg/dL  --   --   --   --  0.3   ALK PHOS U/L  --   --   --   --  134*   ALT (SGPT) U/L  --   --   --   --  19   AST (SGOT) U/L  --   --   --   --  13   GLUCOSE mg/dL 91 115* 148*   < > 348*    < > = values in this interval not displayed.       Radiology:   Imaging Results (Last 72 Hours)       Procedure Component Value Units Date/Time    US Renal Bilateral [[id number]] Collected: 11/19/24 0838     Updated: 11/19/24 0843    Narrative:      US RENAL BILATERAL-     HISTORY: Acute on chronic renal failure; N39.0-Urinary tract infection,  site not specified; N17.9-Acute kidney failure, unspecified;  N18.9-Chronic kidney disease, unspecified; R73.9-Hyperglycemia,  unspecified; Z74.09-Other reduced mobility     COMPARISON: 8/1/2024     FINDINGS: Sonographic imaging in the kidneys and bladder performed.     The visible abdominal aorta normal in caliber, diminished visualization  of portions of the aorta due to overlying shadowing bowel gas.     Bladder is distended and appears normal.     Kidneys are normal in echotexture. The right kidney measures 9.8 x 5.8 x  5.4 cm. There is an exophytic right mid 4.5 cm renal cyst. The left  kidney measures 11.2 x 5.9 x 7.5 cm. No left renal cyst. No  solid-appearing renal mass. No obstructing intrarenal stones. No  hydronephrosis. No abnormal perinephric fluid collection.       Impression:      1. Simple 4.5 cm right mid renal cyst. Otherwise normal sonographic  appearance of the kidneys.  2. Distended normal-appearing bladder.        This report was signed and finalized on 11/19/2024 8:40 AM by Dr. Yuliana Calhoun MD.               Culture:  Blood Culture   Date Value Ref Range  Status   11/18/2024 No growth at 3 days  Preliminary   11/17/2024 No growth at 3 days  Preliminary     Urine Culture   Date Value Ref Range Status   11/17/2024 >100,000 CFU/mL Escherichia coli (A)  Preliminary         Assessment    Acute kidney injury  Baseline chronic kidney disease stage 3b  Hypertension  Uncontrolled type 2 diabetes (A1c 13.4%)  Anemia of CKD  Metabolic acidosis  Acute cystitis  Obesity     Plan:   Does not appear volume overloaded on exam. Has already had AM Bumex dose but will hold the afternoon dose today.  Encourage PO intake  Monitor labs      Stewart Alvarez, APRN  11/21/2024  08:59 CST

## 2024-11-21 NOTE — PLAN OF CARE
Goal Outcome Evaluation:  Plan of Care Reviewed With: patient           Outcome Evaluation: OT tx completed. A&O x4, states pain 7/10 through R rib area, lower abdomen, and L foot. New walking boot, dep for donning. Pt completes bed mobility with mod I, sit<>stand and functional mboiltiy with SBA. Toileting with SBA. Stadning exercises completed with dynamic reaching to challenge balance, completes with SBA-CGA. Pt left in recliner with call light, educated to call for help. Cont OT POC.

## 2024-11-21 NOTE — PLAN OF CARE
Goal Outcome Evaluation:  Plan of Care Reviewed With: patient        Progress: improving  Outcome Evaluation: Nutrition follow up. Pt had N/V yesterday. He was able to consume lunch yesterday but no breakfast or dinner. He reports his nausea is improved today. He is ordered a C.CHO diet and has consumed 100% of breakfast and lunch so far today. Vit C and zinc put on hold per MD d/t N/V. He cont to receive MVI w/minerals. He is aware of alternate food selections as needed. He declines nutrition supplements. Cont to follow for nutrition needs.

## 2024-11-21 NOTE — PLAN OF CARE
Goal Outcome Evaluation:  Plan of Care Reviewed With: patient        Progress: no change  Outcome Evaluation: Pt A&O X4. Isolation precautions in place. IV abx infusing per orders; IID otherwise. Accu checks ACHS. C/o pain x1 thus far; see mar. Voiding per urinal. VSS safety maintained.

## 2024-11-22 LAB
ANION GAP SERPL CALCULATED.3IONS-SCNC: 11 MMOL/L (ref 5–15)
BACTERIA SPEC AEROBE CULT: NORMAL
BUN SERPL-MCNC: 48 MG/DL (ref 8–23)
BUN/CREAT SERPL: 16 (ref 7–25)
CALCIUM SPEC-SCNC: 8.3 MG/DL (ref 8.6–10.5)
CHLORIDE SERPL-SCNC: 104 MMOL/L (ref 98–107)
CO2 SERPL-SCNC: 23 MMOL/L (ref 22–29)
CREAT SERPL-MCNC: 3 MG/DL (ref 0.76–1.27)
DEPRECATED RDW RBC AUTO: 51.7 FL (ref 37–54)
EGFRCR SERPLBLD CKD-EPI 2021: 21.9 ML/MIN/1.73
ERYTHROCYTE [DISTWIDTH] IN BLOOD BY AUTOMATED COUNT: 15.9 % (ref 12.3–15.4)
GLUCOSE BLDC GLUCOMTR-MCNC: 114 MG/DL (ref 70–130)
GLUCOSE BLDC GLUCOMTR-MCNC: 155 MG/DL (ref 70–130)
GLUCOSE BLDC GLUCOMTR-MCNC: 213 MG/DL (ref 70–130)
GLUCOSE BLDC GLUCOMTR-MCNC: 235 MG/DL (ref 70–130)
GLUCOSE SERPL-MCNC: 214 MG/DL (ref 65–99)
HCT VFR BLD AUTO: 33.4 % (ref 37.5–51)
HGB BLD-MCNC: 10.6 G/DL (ref 13–17.7)
MCH RBC QN AUTO: 28.1 PG (ref 26.6–33)
MCHC RBC AUTO-ENTMCNC: 31.7 G/DL (ref 31.5–35.7)
MCV RBC AUTO: 88.6 FL (ref 79–97)
PLATELET # BLD AUTO: 279 10*3/MM3 (ref 140–450)
PMV BLD AUTO: 11.8 FL (ref 6–12)
POTASSIUM SERPL-SCNC: 4.9 MMOL/L (ref 3.5–5.2)
RBC # BLD AUTO: 3.77 10*6/MM3 (ref 4.14–5.8)
SODIUM SERPL-SCNC: 138 MMOL/L (ref 136–145)
WBC NRBC COR # BLD AUTO: 7.46 10*3/MM3 (ref 3.4–10.8)

## 2024-11-22 PROCEDURE — 97530 THERAPEUTIC ACTIVITIES: CPT

## 2024-11-22 PROCEDURE — 25010000002 ONDANSETRON PER 1 MG: Performed by: FAMILY MEDICINE

## 2024-11-22 PROCEDURE — 85027 COMPLETE CBC AUTOMATED: CPT | Performed by: FAMILY MEDICINE

## 2024-11-22 PROCEDURE — 80048 BASIC METABOLIC PNL TOTAL CA: CPT | Performed by: FAMILY MEDICINE

## 2024-11-22 PROCEDURE — 25010000002 ERTAPENEM PER 500 MG: Performed by: FAMILY MEDICINE

## 2024-11-22 PROCEDURE — 97535 SELF CARE MNGMENT TRAINING: CPT

## 2024-11-22 PROCEDURE — 63710000001 INSULIN LISPRO (HUMAN) PER 5 UNITS: Performed by: FAMILY MEDICINE

## 2024-11-22 PROCEDURE — 82948 REAGENT STRIP/BLOOD GLUCOSE: CPT

## 2024-11-22 PROCEDURE — 25810000003 LACTATED RINGERS PER 1000 ML: Performed by: INTERNAL MEDICINE

## 2024-11-22 PROCEDURE — 97116 GAIT TRAINING THERAPY: CPT

## 2024-11-22 RX ORDER — SODIUM CHLORIDE, SODIUM LACTATE, POTASSIUM CHLORIDE, CALCIUM CHLORIDE 600; 310; 30; 20 MG/100ML; MG/100ML; MG/100ML; MG/100ML
75 INJECTION, SOLUTION INTRAVENOUS CONTINUOUS
Status: DISPENSED | OUTPATIENT
Start: 2024-11-22 | End: 2024-11-23

## 2024-11-22 RX ADMIN — INSULIN LISPRO 10 UNITS: 100 INJECTION, SOLUTION INTRAVENOUS; SUBCUTANEOUS at 08:44

## 2024-11-22 RX ADMIN — APIXABAN 5 MG: 5 TABLET, FILM COATED ORAL at 20:40

## 2024-11-22 RX ADMIN — OXYCODONE HYDROCHLORIDE AND ACETAMINOPHEN 1 TABLET: 5; 325 TABLET ORAL at 04:04

## 2024-11-22 RX ADMIN — Medication 10 ML: at 20:31

## 2024-11-22 RX ADMIN — DULOXETINE HYDROCHLORIDE 30 MG: 30 CAPSULE, DELAYED RELEASE ORAL at 08:44

## 2024-11-22 RX ADMIN — OXYCODONE HYDROCHLORIDE AND ACETAMINOPHEN 1 TABLET: 5; 325 TABLET ORAL at 20:40

## 2024-11-22 RX ADMIN — INSULIN LISPRO 4 UNITS: 100 INJECTION, SOLUTION INTRAVENOUS; SUBCUTANEOUS at 20:41

## 2024-11-22 RX ADMIN — ONDANSETRON 4 MG: 2 INJECTION INTRAMUSCULAR; INTRAVENOUS at 08:59

## 2024-11-22 RX ADMIN — DOCUSATE SODIUM 100 MG: 100 CAPSULE, LIQUID FILLED ORAL at 20:40

## 2024-11-22 RX ADMIN — SODIUM CHLORIDE, POTASSIUM CHLORIDE, SODIUM LACTATE AND CALCIUM CHLORIDE 75 ML/HR: 600; 310; 30; 20 INJECTION, SOLUTION INTRAVENOUS at 17:27

## 2024-11-22 RX ADMIN — Medication 10 ML: at 08:44

## 2024-11-22 RX ADMIN — PREGABALIN 75 MG: 75 CAPSULE ORAL at 20:40

## 2024-11-22 RX ADMIN — FAMOTIDINE 20 MG: 10 INJECTION INTRAVENOUS at 08:43

## 2024-11-22 RX ADMIN — LISINOPRIL 5 MG: 10 TABLET ORAL at 08:44

## 2024-11-22 RX ADMIN — INSULIN LISPRO 10 UNITS: 100 INJECTION, SOLUTION INTRAVENOUS; SUBCUTANEOUS at 17:27

## 2024-11-22 RX ADMIN — BISACODYL 5 MG: 5 TABLET, COATED ORAL at 09:24

## 2024-11-22 RX ADMIN — APIXABAN 5 MG: 5 TABLET, FILM COATED ORAL at 08:44

## 2024-11-22 RX ADMIN — TAMSULOSIN HYDROCHLORIDE 0.4 MG: 0.4 CAPSULE ORAL at 08:44

## 2024-11-22 RX ADMIN — DOCUSATE SODIUM 50 MG AND SENNOSIDES 8.6 MG 1 TABLET: 8.6; 5 TABLET, FILM COATED ORAL at 20:40

## 2024-11-22 RX ADMIN — ERTAPENEM 1000 MG: 1 INJECTION INTRAMUSCULAR; INTRAVENOUS at 14:12

## 2024-11-22 RX ADMIN — ROSUVASTATIN 10 MG: 10 TABLET, FILM COATED ORAL at 08:44

## 2024-11-22 RX ADMIN — DOCUSATE SODIUM 100 MG: 100 CAPSULE, LIQUID FILLED ORAL at 08:44

## 2024-11-22 RX ADMIN — INSULIN LISPRO 4 UNITS: 100 INJECTION, SOLUTION INTRAVENOUS; SUBCUTANEOUS at 08:43

## 2024-11-22 RX ADMIN — Medication 1 TABLET: at 08:44

## 2024-11-22 RX ADMIN — OXYCODONE HYDROCHLORIDE AND ACETAMINOPHEN 1 TABLET: 5; 325 TABLET ORAL at 08:59

## 2024-11-22 RX ADMIN — OXYCODONE HYDROCHLORIDE AND ACETAMINOPHEN 1 TABLET: 5; 325 TABLET ORAL at 15:58

## 2024-11-22 NOTE — PROGRESS NOTES
Nephrology ([facility]) Progress Note      Patient:  Erick Luong  YOB: 1955  Date of Service: 11/22/2024  MRN: [id number]   Acct: [id number]   Primary Care Physician: Del Shetty MD  Advance Directive:   Code Status and Medical Interventions: CPR (Attempt to Resuscitate); Full Support   Ordered at: 11/18/24 0159     Code Status (Patient has no pulse and is not breathing):    CPR (Attempt to Resuscitate)     Medical Interventions (Patient has pulse or is breathing):    Full Support     Admit Date: 11/17/2024       Hospital Day: 3  Referring Provider: Garrett Alicia,*      Patient personally seen and examined.  Complete chart including Consults, Notes, Operative Reports, Labs, Cardiology, and Radiology studies reviewed as able.        Subjective:  Erick Luong is a 69 y.o. male for whom we were consulted for evaluation and treatment of acute kidney injury. Baseline chronic kidney disease stage 3b, baseline creatinine approximately 1.5-1.8.  Follows in our office. History of uncontrolled diabetes, hypertension, coronary artery disease, diabetic foot ulcer, obesity. Admitted with cystitis, foot wound and failure to thrive. Minimal PO intake recently. Had no specific complaints at time of nephrology initial exam. Hospital course remarkable for administration of IV fluids and reduction of Bumex dose. Bumex was stopped entirely afternoon of 11/21.    Today is awake and alert. No new complaint or overnight issues. Renal function has declined further today. Urine output nonoliguric     Allergies:  Cefepime, Bactrim [sulfamethoxazole-trimethoprim], Vancomycin, Zolpidem, and Metronidazole    Home Meds:  Medications Prior to Admission   Medication Sig Dispense Refill Last Dose/Taking    apixaban (Eliquis) 5 MG tablet tablet Take 1 tablet by mouth 2 (Two) Times a Day. 60 tablet 0 Taking    ascorbic acid (VITAMIN C) 1000 MG tablet Take 1 tablet by mouth Daily. 30 tablet 3  Taking    bumetanide (BUMEX) 2 MG tablet Take 1 tablet by mouth 2 (Two) Times a Day. 6 tablet 3 Taking    busPIRone (BUSPAR) 10 MG tablet Take 1 tablet by mouth 3 (Three) Times a Day As Needed. (Patient taking differently: Take 1 tablet by mouth 3 (Three) Times a Day As Needed (anxiety).) 270 tablet 4 Taking Differently    donepezil (ARICEPT) 10 MG tablet Take 1 tablet by mouth every night at bedtime. 30 tablet 0 Taking    DULoxetine (CYMBALTA) 60 MG capsule Take 1 capsule by mouth Daily. 30 capsule 0 Taking    famotidine (PEPCID) 20 MG tablet Take 1 tablet by mouth 2 (Two) Times a Day. 60 tablet 0 Taking    insulin glargine (Lantus) 100 UNIT/ML injection Inject 35 Units under the skin into the appropriate area as directed every night at bedtime. 10 mL 0 Taking    lisinopril (PRINIVIL,ZESTRIL) 5 MG tablet Take 1 tablet by mouth Daily. 30 tablet 0 Taking    melatonin 3 MG tablet Take 2 tablets by mouth At Night As Needed for Sleep. 30 tablet 0 Taking As Needed    midodrine (PROAMATINE) 2.5 MG tablet Take 1 tablet by mouth 2 (Two) Times a Day. 60 tablet 5 Taking    oxyCODONE (ROXICODONE) 10 MG tablet Take 1 tablet by mouth 2 (Two) Times a Day As Needed. (Patient taking differently: Take 1 tablet by mouth 2 (Two) Times a Day As Needed for Moderate Pain or Severe Pain. *must last 30 days*) 55 tablet 0 Taking Differently    pantoprazole (PROTONIX) 40 MG EC tablet Take 1 tablet by mouth 2 (Two) Times a Day. 60 tablet 0 Taking    polyethylene glycol (MIRALAX) 17 GM/SCOOP powder Take 17 g by mouth Daily As Needed (constipation).   Taking As Needed    potassium chloride ER (K-TAB) 20 MEQ tablet controlled-release ER tablet Take 1 tablet by mouth Daily. 30 tablet 0 Taking    rosuvastatin (CRESTOR) 10 MG tablet Take 1 tablet by mouth Daily. 30 tablet 0 Taking    sennosides-docusate (PERICOLACE) 8.6-50 MG per tablet Take 1 tablet by mouth Every Night. Obtain OTC   Taking    tamsulosin (FLOMAX) 0.4 MG capsule 24 hr capsule Take  1 capsule by mouth Daily. 90 capsule 4 Taking    Insulin Lispro (humaLOG) 100 UNIT/ML injection Inject 2-12 Units under the skin into the appropriate area as directed 4 (Four) Times a Day Before Meals & at Bedtime. Inject subcutaneously four times a day per sliding scale:  0-150 = 0 units; 151-200 = 2u; 201-250 = 4u; 251-300 = 6u; 301-350 = 8u; 351-400 = 10u; 401+ = 12u       nitroglycerin (NITROSTAT) 0.4 MG SL tablet Place 1 tablet under the tongue Every 5 (Five) Minutes As Needed for Chest Pain. Take no more than 3 doses in 15 minutes.       pregabalin (LYRICA) 100 MG capsule Take 1 capsule by mouth Daily.       pregabalin (LYRICA) 100 MG capsule Take 2 capsules by mouth Every Night.       sodium hypochlorite (DAKIN'S 1/4 STRENGTH) 0.125 % solution topical solution 0.125% Apply  topically to the appropriate area as directed 2 (Two) Times a Day. 473 mL 5        Medicines:  Current Facility-Administered Medications   Medication Dose Route Frequency Provider Last Rate Last Admin    acetaminophen (TYLENOL) tablet 650 mg  650 mg Oral Q4H PRN Garrett Alicia MD        Or    acetaminophen (TYLENOL) 160 MG/5ML oral solution 650 mg  650 mg Oral Q4H PRN Garrett Alicia MD        Or    acetaminophen (TYLENOL) suppository 650 mg  650 mg Rectal Q4H PRN Garrett Alicia MD        apixaban (ELIQUIS) tablet 5 mg  5 mg Oral BID Garrett Alicia MD   5 mg at 11/22/24 0844    [Held by provider] ascorbic acid (VITAMIN C) tablet 500 mg  500 mg Oral BID Telly Rodriguez MD   500 mg at 11/20/24 0837    sennosides-docusate (PERICOLACE) 8.6-50 MG per tablet 2 tablet  2 tablet Oral BID PRN Garrtet Alicia MD        And    polyethylene glycol (MIRALAX) packet 17 g  17 g Oral Daily PRN Garrett Alicia MD        And    bisacodyl (DULCOLAX) EC tablet 5 mg  5 mg Oral Daily PRN Garrett Alicia MD   5 mg at 11/22/24 0924    And    bisacodyl (DULCOLAX) suppository 10 mg  10 mg Rectal  Daily PRN Garrett Alicia MD        [Held by provider] bumetanide (BUMEX) tablet 1 mg  1 mg Oral BID Telly Rodriguez MD   1 mg at 11/21/24 0914    busPIRone (BUSPAR) tablet 10 mg  10 mg Oral TID PRN Garrett Alicia MD   10 mg at 11/20/24 2055    calcium carbonate (TUMS) chewable tablet 500 mg (200 mg elemental)  2 tablet Oral TID PRN Telly Rodriguez MD   2 tablet at 11/20/24 1459    Calcium Replacement - Follow Nurse / BPA Driven Protocol   Not Applicable PRN Telly Rodriguez MD        dextrose (D50W) (25 g/50 mL) IV injection 25 g  25 g Intravenous Q15 Min PRN Garrett Alicia MD        dextrose (GLUTOSE) oral gel 15 g  15 g Oral Q15 Min PRN Garrett Alicia MD   15 g at 11/19/24 0040    docusate sodium (COLACE) capsule 100 mg  100 mg Oral BID Telly Rodriguez MD   100 mg at 11/22/24 0844    [Held by provider] donepezil (ARICEPT) tablet 10 mg  10 mg Oral Nightly Garrett Alicia MD   10 mg at 11/18/24 0219    DULoxetine (CYMBALTA) DR capsule 30 mg  30 mg Oral Daily Telly Rodriguez MD   30 mg at 11/22/24 0844    ertapenem (INVanz) 1,000 mg in sodium chloride 0.9 % 100 mL MBP  1,000 mg Intravenous Q24H Telly Rodriguez  mL/hr at 11/21/24 1604 1,000 mg at 11/21/24 1604    famotidine (PEPCID) injection 20 mg  20 mg Intravenous Daily Telly Rodriguez MD   20 mg at 11/22/24 0843    glucagon (GLUCAGEN) injection 1 mg  1 mg Intramuscular Q15 Min PRN Garrett Alicia MD        insulin glargine (LANTUS, SEMGLEE) injection 20 Units  20 Units Subcutaneous Nightly Garrett Alicia MD   20 Units at 11/18/24 2143    Insulin Lispro (humaLOG) injection 10 Units  10 Units Subcutaneous TID With Meals Garrett Alicia MD   10 Units at 11/22/24 0844    Insulin Lispro (humaLOG) injection 2-9 Units  2-9 Units Subcutaneous 4x Daily AC & at Bedtime Garrett Alicia MD   4 Units at 11/22/24 0843    lisinopril (PRINIVIL,ZESTRIL) tablet 5 mg  5 mg Oral Daily Ravin  Garrett Cabrera MD   5 mg at 11/22/24 0844    Magnesium Standard Dose Replacement - Follow Nurse / BPA Driven Protocol   Not Applicable PRTelly Arriaga MD        multivitamin with minerals 1 tablet  1 tablet Oral Daily Telly Rodriguez MD   1 tablet at 11/22/24 0844    nitroglycerin (NITROSTAT) SL tablet 0.4 mg  0.4 mg Sublingual Q5 Min PRN Garrett Alicia MD        ondansetron (ZOFRAN) injection 4 mg  4 mg Intravenous Q6H PRN Garrett Alicia MD   4 mg at 11/22/24 0859    oxyCODONE-acetaminophen (PERCOCET) 5-325 MG per tablet 1 tablet  1 tablet Oral Q4H PRN Telly Rodriguez MD   1 tablet at 11/22/24 0859    Phosphorus Replacement - Follow Nurse / BPA Driven Protocol   Not Applicable PRN Telly Rodriguez MD        Potassium Replacement - Follow Nurse / BPA Driven Protocol   Not Applicable PRN Telly Rodriguez MD        pregabalin (LYRICA) capsule 75 mg  75 mg Oral Nightly Telly Rodriguez MD   75 mg at 11/21/24 2044    prochlorperazine (COMPAZINE) injection 5 mg  5 mg Intravenous Q6H PRN Telly Rodriguez MD   5 mg at 11/20/24 1456    rosuvastatin (CRESTOR) tablet 10 mg  10 mg Oral Daily Garrett Alicia MD   10 mg at 11/22/24 0844    sennosides-docusate (PERICOLACE) 8.6-50 MG per tablet 1 tablet  1 tablet Oral Nightly Garrett Alicia MD   1 tablet at 11/21/24 2044    sodium chloride 0.9 % flush 10 mL  10 mL Intravenous Q12H Garrett Alicia MD   10 mL at 11/22/24 0844    sodium chloride 0.9 % flush 10 mL  10 mL Intravenous PRN Garrett Alicia MD        sodium chloride 0.9 % infusion 40 mL  40 mL Intravenous PRN Garrett Alicai MD        tamsulosin (FLOMAX) 24 hr capsule 0.4 mg  0.4 mg Oral Daily Garrett Alicia MD   0.4 mg at 11/22/24 0844    [Held by provider] zinc sulfate (ZINCATE) capsule 220 mg  220 mg Oral Daily Telly Rodriguez MD   220 mg at 11/20/24 0836       Past Medical History:  Past Medical History:   Diagnosis Date    Arthritis      Autonomic disease     CAD (coronary artery disease) 02/06/2017    Cervical radiculopathy 09/16/2021    Chronic constipation with acute exaccerbation 05/10/2021    Coronary artery disease     Degeneration of cervical intervertebral disc 08/11/2021    Diabetes mellitus     Diabetic foot ulcer 08/31/2020    Diabetic polyneuropathy associated with type 2 diabetes mellitus 01/18/2021    Elevated cholesterol     Gastroesophageal reflux disease 05/13/2019    Headache     HTN (hypertension), benign 05/03/2017    Hyperlipidemia     Hypertension     Mixed hyperlipidemia 02/07/2017    Multiple lung nodules 01/26/2020    5mm, 9 mm RLL identified 1/2020, not present 10/2019.    Myocardial infarction     Osteomyelitis 01/22/2020    Osteomyelitis of fifth toe of right foot 10/07/2019    Pancreatitis     Persistent insomnia 01/20/2020    Renal disorder     Sleep apnea 02/06/2017    Sleep apnea with use of continuous positive airway pressure (CPAP)     NON-COMPLIANT    Slow transit constipation 01/16/2019    Spinal stenosis in cervical region 09/16/2021    Vitamin D deficiency 03/02/2021       Past Surgical History:  Past Surgical History:   Procedure Laterality Date    ABDOMINAL SURGERY      AMPUTATION FOOT / TOE Left 10/2021    5th digit     ANTERIOR CERVICAL DISCECTOMY W/ FUSION N/A 08/05/2022    Procedure: CERVICAL DISCECTOMY ANTERIOR WITH FUSION C5-6 with possible plating of C5-7 with neuromonitoring and 1 c-arm;  Surgeon: Karel Soliz MD;  Location:  [facility] OR;  Service: Neurosurgery;  Laterality: N/A;    APPENDECTOMY      BACK SURGERY      CARDIAC CATHETERIZATION Left 02/08/2021    Procedure: Left Heart Cath w poss intervention left anatomical snuff box acess;  Surgeon: Omkar Charles DO;  Location:  [facility] CATH INVASIVE LOCATION;  Service: Cardiology;  Laterality: Left;    CARDIAC SURGERY      CATARACT EXTRACTION      CERVICAL SPINE SURGERY      COLONOSCOPY N/A 01/31/2017    Normal exam repeat in 5 years     COLONOSCOPY N/A 02/11/2019    Mild acute inflammation    COLONOSCOPY N/A 04/07/2024    2 areas at 10 and 20 cm with friability ulceration 2 clips placed at 20 cm and 4 clips at 10 cm poor prep normal mucosa,mild eroisions and ulcerations in visible vessels    COLONOSCOPY N/A 7/1/2024    Procedure: COLONOSCOPY WITH ANESTHESIA;  Surgeon: Arsalan Lorenzo DO;  Location: [facility] ENDOSCOPY;  Service: Gastroenterology;  Laterality: N/A;  pre op constipation/diarrhea  post poor prep  pcp Del Shetty    COLONOSCOPY N/A 7/2/2024    Procedure: COLONOSCOPY WITH ANESTHESIA;  Surgeon: Agapito Christopher MD;  Location: [facility] ENDOSCOPY;  Service: Gastroenterology;  Laterality: N/A;  pre rectal bleeding  post poor prep  pcp Del Shetty MD    COLONOSCOPY W/ POLYPECTOMY  03/04/2014    Hyperplastic polyp    CORONARY ARTERY BYPASS GRAFT  10/2015    ENDOSCOPY  04/13/2011    Gastritis with hemorrhage    ENDOSCOPY N/A 05/05/2017    Normal exam    ENDOSCOPY N/A 02/11/2019    Gastritis    ENDOSCOPY N/A 09/01/2020    Non-erosive gastritis with hemorrhage    ENDOSCOPY N/A 02/10/2021    Esophagitis    ENDOSCOPY N/A 04/11/2024    There were esophageal mucosal changes suspicious for short-segment Low's esophagus present in the distal esophagus. The maximum longitudinal extent of these mucosal changes was 2 cm in length. Mucosa was biopsied with a cold forceps for histologyDistal esophagus, biopsies: Mild chronic active esophagogastritis. No evidence of intestinal metaplasia, dysplasia. Antrum, bx, Mild chronic gastritis    FOOT SURGERY Left     INCISION AND DRAINAGE OF WOUND Left 09/2007    spider bite       Family History  Family History   Problem Relation Age of Onset    Colon cancer Father     Heart disease Father     Colon cancer Sister     Colon polyps Sister     Alzheimer's disease Mother     Coronary artery disease Sister     Coronary artery disease Sister        Social History  Social History     Socioeconomic History     Marital status:    Tobacco Use    Smoking status: Former     Current packs/day: 0.00     Types: Cigarettes     Quit date:      Years since quittin.9    Smokeless tobacco: Never    Tobacco comments:     smoked in highschool   Vaping Use    Vaping status: Never Used   Substance and Sexual Activity    Alcohol use: No    Drug use: No    Sexual activity: Defer       Review of Systems:  History obtained from chart review and the patient  General ROS: No fever or chills  Respiratory ROS: No cough, shortness of breath, wheezing  Cardiovascular ROS: No chest pain or palpitations  Gastrointestinal ROS: No abdominal pain or melena  Genito-Urinary ROS: No dysuria or hematuria  Psych ROS: No anxiety and depression  14 point ROS reviewed with the patient and negative except as noted above and in the HPI unless unable to obtain.    Objective:  Patient Vitals for the past 24 hrs:   BP Temp Temp src Pulse Resp SpO2   24 0727 115/62 97.7 °F (36.5 °C) Oral 73 16 97 %   24 0224 110/59 98.2 °F (36.8 °C) Oral 72 16 97 %   24 1915 113/70 98.6 °F (37 °C) Oral 64 16 98 %   24 1608 115/65 97.9 °F (36.6 °C) Oral 66 16 97 %   24 1203 109/71 97.7 °F (36.5 °C) Oral 86 16 98 %       Intake/Output Summary (Last 24 hours) at 2024 1044  Last data filed at 2024 0900  Gross per 24 hour   Intake 840 ml   Output 1250 ml   Net -410 ml     General: awake/alert   Chest:  clear to auscultation bilaterally without respiratory distress  CVS: regular rate and rhythm  Abdominal: soft, nontender, positive bowel sounds  Extremities: no cyanosis or edema  Skin: warm and dry without rash      Labs:  Results from last 7 days   Lab Units 24  0336 24  0645   WBC 10*3/mm3 7.46 6.58 5.94   HEMOGLOBIN g/dL 10.6* 10.7* 10.6*   HEMATOCRIT % 33.4* 33.0* 33.9*   PLATELETS 10*3/mm3 279 262 256         Results from last 7 days   Lab Units 24  0336 24  0359 24  0606  11/18/24  0416 11/17/24  2153   SODIUM mmol/L 138 140 141   < > 138   POTASSIUM mmol/L 4.9 4.6 4.6   < > 4.4   CHLORIDE mmol/L 104 105 106   < > 106   CO2 mmol/L 23.0 24.0 19.0*   < > 19.0*   BUN mg/dL 48* 40* 43*   < > 53*   CREATININE mg/dL 3.00* 2.51* 2.44*   < > 2.83*   CALCIUM mg/dL 8.3* 8.8 8.6   < > 8.6   EGFR mL/min/1.73 21.9* 27.2* 28.1*   < > 23.5*   BILIRUBIN mg/dL  --   --   --   --  0.3   ALK PHOS U/L  --   --   --   --  134*   ALT (SGPT) U/L  --   --   --   --  19   AST (SGOT) U/L  --   --   --   --  13   GLUCOSE mg/dL 214* 91 115*   < > 348*    < > = values in this interval not displayed.       Radiology:   Imaging Results (Last 72 Hours)       ** No results found for the last 72 hours. **            Culture:  Blood Culture   Date Value Ref Range Status   11/18/2024 No growth at 3 days  Preliminary   11/17/2024 No growth at 3 days  Preliminary     Urine Culture   Date Value Ref Range Status   11/17/2024 >100,000 CFU/mL Escherichia coli (A)  Preliminary         Assessment    Acute kidney injury  Baseline chronic kidney disease stage 3b  Hypertension  Uncontrolled type 2 diabetes (A1c 13.4%)  Anemia of CKD  Metabolic acidosis  Acute cystitis  Obesity     Plan:  Continue to hold diuretics. Encourage adequate PO fluid intake  Monitor labs      Stewart Alvarez, SUSAN  11/22/2024  10:44 CST

## 2024-11-22 NOTE — THERAPY TREATMENT NOTE
Patient Name: Erick Luong  : 1955    MRN: [id number]                              Today's Date: 2024       Admit Date: 2024    Visit Dx:     ICD-10-CM ICD-9-CM   1. Acute UTI (urinary tract infection)  N39.0 599.0   2. Acute renal failure superimposed on chronic kidney disease, unspecified acute renal failure type, unspecified CKD stage  N17.9 584.9    N18.9 585.9   3. Acute hyperglycemia  R73.9 790.29   4. Impaired functional mobility and activity tolerance [Z74.09]  Z74.09 V49.89     Patient Active Problem List   Diagnosis    Obesity, unspecified obesity severity, unspecified obesity type    Essential hypertension    Type 2 diabetes mellitus with hyperglycemia, with long-term current use of insulin    Nonsmoker    Anemia due to chronic kidney disease    Class 3 severe obesity due to excess calories with body mass index (BMI) of 40.0 to 44.9 in adult    Anasarca    Sleep apnea with use of continuous positive airway pressure (CPAP)    Medically noncompliant    Diabetic ulcer of left foot associated with type 2 diabetes mellitus    Diabetic polyneuropathy associated with type 2 diabetes mellitus    Spinal stenosis in cervical region    Degeneration of cervical intervertebral disc    Cervical radiculopathy    Degeneration of lumbar or lumbosacral intervertebral disc    Cervical myelopathy    Bilateral carpal tunnel syndrome    CAD (coronary artery disease)    GERD without esophagitis    BPH without obstruction/lower urinary tract symptoms    Stage 3b chronic kidney disease    Chronic diastolic heart failure    Type 2 myocardial infarction due to heart failure    Left carpal tunnel syndrome    Syncope and collapse, recurrent episodes    Poorly-controlled hypertension    Rhinovirus    Peripheral vascular disease    Chronic kidney disease (CKD), stage IV (severe)    Diabetic foot infection    Sepsis    Epigastric pain    Chronic heart failure with preserved ejection fraction (HFpEF)    Sepsis due to  methicillin resistant Staphylococcus aureus (MRSA) with encephalopathy without septic shock    Slow transit constipation    CHF exacerbation    CHF (congestive heart failure), NYHA class I, acute on chronic, combined    Rectal bleeding    Acute on chronic heart failure with preserved ejection fraction (HFpEF)    DKA, type 2, not at goal    Atrial fibrillation    E. coli UTI, ESBL    Acute UTI (urinary tract infection)    Acute encephalopathy    Type 2 diabetes mellitus with hyperglycemia, with long-term current use of insulin     Past Medical History:   Diagnosis Date    Arthritis     Autonomic disease     CAD (coronary artery disease) 02/06/2017    Cervical radiculopathy 09/16/2021    Chronic constipation with acute exaccerbation 05/10/2021    Coronary artery disease     Degeneration of cervical intervertebral disc 08/11/2021    Diabetes mellitus     Diabetic foot ulcer 08/31/2020    Diabetic polyneuropathy associated with type 2 diabetes mellitus 01/18/2021    Elevated cholesterol     Gastroesophageal reflux disease 05/13/2019    Headache     HTN (hypertension), benign 05/03/2017    Hyperlipidemia     Hypertension     Mixed hyperlipidemia 02/07/2017    Multiple lung nodules 01/26/2020    5mm, 9 mm RLL identified 1/2020, not present 10/2019.    Myocardial infarction     Osteomyelitis 01/22/2020    Osteomyelitis of fifth toe of right foot 10/07/2019    Pancreatitis     Persistent insomnia 01/20/2020    Renal disorder     Sleep apnea 02/06/2017    Sleep apnea with use of continuous positive airway pressure (CPAP)     NON-COMPLIANT    Slow transit constipation 01/16/2019    Spinal stenosis in cervical region 09/16/2021    Vitamin D deficiency 03/02/2021     Past Surgical History:   Procedure Laterality Date    ABDOMINAL SURGERY      AMPUTATION FOOT / TOE Left 10/2021    5th digit     ANTERIOR CERVICAL DISCECTOMY W/ FUSION N/A 08/05/2022    Procedure: CERVICAL DISCECTOMY ANTERIOR WITH FUSION C5-6 with possible plating  of C5-7 with neuromonitoring and 1 c-arm;  Surgeon: Karel Soliz MD;  Location: [facility] OR;  Service: Neurosurgery;  Laterality: N/A;    APPENDECTOMY      BACK SURGERY      CARDIAC CATHETERIZATION Left 02/08/2021    Procedure: Left Heart Cath w poss intervention left anatomical snuff box acess;  Surgeon: Omkar Charles DO;  Location: [facility] CATH INVASIVE LOCATION;  Service: Cardiology;  Laterality: Left;    CARDIAC SURGERY      CATARACT EXTRACTION      CERVICAL SPINE SURGERY      COLONOSCOPY N/A 01/31/2017    Normal exam repeat in 5 years    COLONOSCOPY N/A 02/11/2019    Mild acute inflammation    COLONOSCOPY N/A 04/07/2024    2 areas at 10 and 20 cm with friability ulceration 2 clips placed at 20 cm and 4 clips at 10 cm poor prep normal mucosa,mild eroisions and ulcerations in visible vessels    COLONOSCOPY N/A 7/1/2024    Procedure: COLONOSCOPY WITH ANESTHESIA;  Surgeon: Arsalan Lorenzo DO;  Location: [facility] ENDOSCOPY;  Service: Gastroenterology;  Laterality: N/A;  pre op constipation/diarrhea  post poor prep  pcp Del Shetty    COLONOSCOPY N/A 7/2/2024    Procedure: COLONOSCOPY WITH ANESTHESIA;  Surgeon: Agapito Christopher MD;  Location: [facility] ENDOSCOPY;  Service: Gastroenterology;  Laterality: N/A;  pre rectal bleeding  post poor prep  pcp Del Shetty MD    COLONOSCOPY W/ POLYPECTOMY  03/04/2014    Hyperplastic polyp    CORONARY ARTERY BYPASS GRAFT  10/2015    ENDOSCOPY  04/13/2011    Gastritis with hemorrhage    ENDOSCOPY N/A 05/05/2017    Normal exam    ENDOSCOPY N/A 02/11/2019    Gastritis    ENDOSCOPY N/A 09/01/2020    Non-erosive gastritis with hemorrhage    ENDOSCOPY N/A 02/10/2021    Esophagitis    ENDOSCOPY N/A 04/11/2024    There were esophageal mucosal changes suspicious for short-segment Low's esophagus present in the distal esophagus. The maximum longitudinal extent of these mucosal changes was 2 cm in length. Mucosa was biopsied with a cold forceps for histologyDistal  esophagus, biopsies: Mild chronic active esophagogastritis. No evidence of intestinal metaplasia, dysplasia. Antrum, bx, Mild chronic gastritis    FOOT SURGERY Left     INCISION AND DRAINAGE OF WOUND Left 09/2007    spider bite      General Information       Row Name 11/22/24 1337          OT Time and Intention    Subjective Information complains of;pain  -     Document Type therapy note (daily note)  -     Mode of Treatment occupational therapy  -     Patient Effort good  -       Row Name 11/22/24 1337          Cognition    Orientation Status (Cognition) oriented x 4  -       Row Name 11/22/24 1337          Safety Issues/Impairments Affecting Functional Mobility    Impairments Affecting Function (Mobility) balance;endurance/activity tolerance;pain;strength;sensation/sensory awareness  -               User Key  (r) = Recorded By, (t) = Taken By, (c) = Cosigned By      Initials Name Provider Type     America Wright OTR/L Occupational Therapist                     Mobility/ADL's       Row Name 11/22/24 1337          Bed Mobility    Bed Mobility supine-sit;sit-supine  -     Supine-Sit Rich (Bed Mobility) modified independence  -     Sit-Supine Rich (Bed Mobility) modified independence  -     Assistive Device (Bed Mobility) bed rails;head of bed elevated  -       Row Name 11/22/24 1330          Transfers    Transfers sit-stand transfer;stand-sit transfer  -[facility] Name 11/22/24 1337          Sit-Stand Transfer    Sit-Stand Rich (Transfers) standby assist;verbal cues  -     Assistive Device (Sit-Stand Transfers) walker, front-wheeled  -[facility] Name 11/22/24 1337          Stand-Sit Transfer    Stand-Sit Rich (Transfers) standby assist;verbal cues  -     Assistive Device (Stand-Sit Transfers) walker, front-wheeled  -       Row Name 11/22/24 1337          Functional Mobility    Functional Mobility- Ind. Level standby assist  -     Functional Mobility-  Device walker, front-wheeled  -KP       Row Name 11/22/24 1331          Activities of Daily Living    BADL Assessment/Intervention lower body dressing  -       Row Name 11/22/24 1337          Lower Body Dressing Assessment/Training    [place] Level (Lower Body Dressing) doff;other (see comments)  walking boot  -     Position (Lower Body Dressing) edge of bed sitting;unsupported sitting  -               User Key  (r) = Recorded By, (t) = Taken By, (c) = Cosigned By      Initials Name Provider Type    America Maciel OTR/L Occupational Therapist                   Obj/Interventions       Row Name 11/22/24 1334          Balance    Balance Assessment sitting static balance;sitting dynamic balance;standing static balance;standing dynamic balance  -     Static Sitting Balance independent  -     Dynamic Sitting Balance independent  -KP     Position, Sitting Balance unsupported;sitting edge of bed  -     Static Standing Balance standby assist  -     Dynamic Standing Balance standby assist  -     Position/Device Used, Standing Balance supported;walker, front-wheeled  -KP     Balance Interventions sitting;standing;sit to stand;supported;dynamic reaching;occupation based/functional task  -               User Key  (r) = Recorded By, (t) = Taken By, (c) = Cosigned By      Initials Name Provider Type    America Maciel OTR/L Occupational Therapist                   Goals/Plan    No documentation.                  Clinical Impression       Row Name 11/22/24 4176          Pain Assessment    Pretreatment Pain Rating 7/10  -KP     Posttreatment Pain Rating 7/10  -KP     Pain Location abdomen  -     Pain Side/Orientation lower  -KP     Pain Management Interventions exercise or physical activity utilized;positioning techniques utilized  -     Response to Pain Interventions activity participation with tolerable pain  -KP       Row Name 11/22/24 1633          Plan of Care Review    Plan of Care Reviewed  With patient;spouse  -KP     Outcome Evaluation OT tx completed. A&O x4, c/o pain 7/10 through lower abdomen. Completes bed mobility w/ mod I, transfers and func mob with FWW and SBA. Completes doffing of walking boot sitting EOB. Sitting EOB, completes various BUE exercises for improved BUE strength. Left fowlers in bed with nsg notified, call light. Cont OT POC  -KP       Row Name 11/22/24 9447          Positioning and Restraints    Pre-Treatment Position in bed  -KP     Post Treatment Position bed  -KP     In Bed notified nsg;fowlers;call light within reach;encouraged to call for assist  -KP               User Key  (r) = Recorded By, (t) = Taken By, (c) = Cosigned By      Initials Name Provider Type    America Maciel, OTR/L Occupational Therapist                   Outcome Measures       Row Name 11/22/24 1427          How much help from another is currently needed...    Putting on and taking off regular lower body clothing? 3  -KP     Bathing (including washing, rinsing, and drying) 3  -KP     Toileting (which includes using toilet bed pan or urinal) 4  -KP     Putting on and taking off regular upper body clothing 4  -KP     Taking care of personal grooming (such as brushing teeth) 4  -KP     Eating meals 4  -KP     AM-PAC 6 Clicks Score (OT) 22  -KP       Row Name 11/22/24 0859          How much help from another person do you currently need...    Turning from your back to your side while in flat bed without using bedrails? 4  -CH     Moving from lying on back to sitting on the side of a flat bed without bedrails? 4  -CH     Moving to and from a bed to a chair (including a wheelchair)? 4  -CH     Standing up from a chair using your arms (e.g., wheelchair, bedside chair)? 4  -CH     Climbing 3-5 steps with a railing? 2  -CH     To walk in hospital room? 2  -CH     AM-PAC 6 Clicks Score (PT) 20  -CH     Highest Level of Mobility Goal 6 --> Walk 10 steps or more  -CH       Row Name 11/22/24 5300           Functional Assessment    Outcome Measure Options AM-PAC 6 Clicks Daily Activity (OT)  -               User Key  (r) = Recorded By, (t) = Taken By, (c) = Cosigned By      Initials Name Provider Type    Tiffany Cadena, RN Registered Nurse    America Maciel, OTR/L Occupational Therapist                    Occupational Therapy Education       Title: PT OT SLP Therapies (In Progress)       Topic: Occupational Therapy (In Progress)       Point: ADL training (Done)       Description:   Instruct learner(s) on proper safety adaptation and remediation techniques during self care or transfers.   Instruct in proper use of assistive devices.                  Learning Progress Summary            Patient Acceptance, E, VU by  at 11/22/2024 1345    Acceptance, E, VU by  at 11/21/2024 1202    Acceptance, E, VU,NR by  at 11/20/2024 0825   Family Acceptance, E, VU by  at 11/22/2024 1345                      Point: Home exercise program (Not Started)       Description:   Instruct learner(s) on appropriate technique for monitoring, assisting and/or progressing therapeutic exercises/activities.                  Learner Progress:  Not documented in this visit.              Point: Precautions (Done)       Description:   Instruct learner(s) on prescribed precautions during self-care and functional transfers.                  Learning Progress Summary            Patient Acceptance, E, VU by  at 11/22/2024 1345    Acceptance, E, VU by  at 11/21/2024 1202    Acceptance, E, VU,NR by  at 11/20/2024 0825   Family Acceptance, E, VU by  at 11/22/2024 1345                      Point: Body mechanics (Done)       Description:   Instruct learner(s) on proper positioning and spine alignment during self-care, functional mobility activities and/or exercises.                  Learning Progress Summary            Patient Acceptance, E, VU by  at 11/22/2024 1345    Acceptance, E, VU by  at 11/21/2024 1202    Acceptance, E, VU,NR  by  at 11/20/2024 0825   Family Acceptance, E, VU by  at 11/22/2024 1345                                      User Key       Initials Effective Dates Name Provider Type Discipline    BLAYNE 06/20/22 -  Alesha Barrett OTR/L Occupational Therapist OT     10/08/24 -  America Wright OTR/L Occupational Therapist OT                  OT Recommendation and Plan     Plan of Care Review  Plan of Care Reviewed With: patient, spouse  Outcome Evaluation: OT tx completed. A&O x4, c/o pain 7/10 through lower abdomen. Completes bed mobility w/ mod I, transfers and func mob with FWW and SBA. Completes doffing of walking boot sitting EOB. Sitting EOB, completes various BUE exercises for improved BUE strength. Left fowlers in bed with nsg notified, call light. Cont OT POC     Time Calculation:         Time Calculation- OT       Row Name 11/22/24 1345 11/22/24 0843          Time Calculation- OT    OT Start Time 1305  - --     OT Stop Time 1330  - --     OT Time Calculation (min) 25 min  - --     OT Received On 11/22/24  - --        Timed Charges    60292 - Gait Training Minutes  -- 15  -AE     39464 - OT Therapeutic Activity Minutes 15  - --     37814 - OT Self Care/Mgmt Minutes 10  -KP --        Total Minutes    Timed Charges Total Minutes 25  - 15  -AE      Total Minutes 25  - 15  -AE               User Key  (r) = Recorded By, (t) = Taken By, (c) = Cosigned By      Initials Name Provider Type    AE Hansa Arambula, PTA Physical Therapist Assistant     America Wright OTR/L Occupational Therapist                  Therapy Charges for Today       Code Description Service Date Service Provider Modifiers Qty    07103824511 HC OT THERAPEUTIC ACT EA 15 MIN 11/21/2024 America Wright OTR/L GO 1    75634147316 HC OT SELF CARE/MGMT/TRAIN EA 15 MIN 11/21/2024 America Wright OTR/L GO 1    49554539263 HC OT THERAPEUTIC ACT EA 15 MIN 11/22/2024 America Wright OTR/L GO 1    76061715713 HC OT SELF CARE/MGMT/TRAIN EA 15 MIN  11/22/2024 America Wright, OTR/L GO 1                 America Wright, OTR/L  11/22/2024

## 2024-11-22 NOTE — CASE MANAGEMENT/SOCIAL WORK
Continued Stay Note  MAYCOL Lobo     Patient Name: Erick Luong  MRN: [id number]  Today's Date: 11/22/2024    Admit Date: 11/17/2024    Plan: Home   Discharge Plan       Row Name 11/22/24 1528       Plan    Plan Home    Patient/Family in Agreement with Plan yes    Plan Comments Pt lives at home with his spouse. Therapy has recommended home health. Will follow.                   Discharge Codes    No documentation.                       MEJIA Carreno

## 2024-11-22 NOTE — PLAN OF CARE
Goal Outcome Evaluation:  Plan of Care Reviewed With: patient        Progress: improving  Outcome Evaluation: Pt was SBA supine to sit and c/o R flank pain and abdomen pain 6/10 post PT.PTA donned CAM boot on L LE.He was CGA to stand and to walk with RW 30ft with no LOB.Pt completed AROM B LE ex's and practiced sit to stand x 5 reps.Pt would benefit from  PT.

## 2024-11-22 NOTE — PROGRESS NOTES
[facility] Medicine Services  INPATIENT PROGRESS NOTE    Patient Name: Erick Luong  Date of Admission: 11/17/2024  Today's Date: 11/22/24  Length of Stay: 3  Primary Care Physician: Del Shetty MD    Subjective   Chief Complaint: UTI/failure to thrive/foot wound.   HPI   Blood pressure stable, afebrile.  Discussed with wife.  Creatinine increased. Hemoglobin stable, . Uncontrolled diabetes discussed.    Review of Systems   Constitutional:  Positive for activity change, appetite change and fatigue. Negative for chills and fever.   HENT:  Negative for hearing loss, nosebleeds, tinnitus and trouble swallowing.    Eyes:  Negative for visual disturbance.   Respiratory:  Negative for cough, chest tightness, shortness of breath and wheezing.    Cardiovascular:  Negative for chest pain, palpitations and leg swelling.   Gastrointestinal:  Negative for abdominal distention, abdominal pain, blood in stool, constipation, diarrhea, nausea and vomiting.   Endocrine: Negative for cold intolerance, heat intolerance, polydipsia, polyphagia and polyuria.   Genitourinary:  Negative for decreased urine volume, difficulty urinating, dysuria, flank pain, frequency and hematuria.   Musculoskeletal:  Positive for arthralgias, gait problem and myalgias. Negative for joint swelling.   Skin:  Negative for rash.   Allergic/Immunologic: Negative for immunocompromised state.   Neurological:  Positive for weakness. Negative for dizziness, syncope, light-headedness and headaches.   Hematological:  Negative for adenopathy. Does not bruise/bleed easily.   Psychiatric/Behavioral:  Negative for confusion and sleep disturbance. The patient is not nervous/anxious.    All pertinent negatives and positives are as above. All other systems have been reviewed and are negative unless otherwise stated.     Objective    Temp:  [97.7 °F (36.5 °C)-98.6 °F (37 °C)] 97.7 °F (36.5 °C)  Heart Rate:  [64-73] 70  Resp:  [16]  16  BP: (110-115)/(56-70) 113/56  Physical Exam  Vitals and nursing note reviewed.   Constitutional:       Comments: Chronically ill.   HENT:      Head: Normocephalic.   Eyes:      Conjunctiva/sclera: Conjunctivae normal.      Pupils: Pupils are equal, round, and reactive to light.   Cardiovascular:      Rate and Rhythm: Heart rates in 70s.. Rhythm irregular.      Heart sounds: Normal heart sounds.   Pulmonary:      Effort: No respiratory distress.   Abdominal:      General: Bowel sounds are normal. There is no distension.      Palpations: Abdomen is soft.      Tenderness: There is no abdominal tenderness.      Comments: Obesity.   Musculoskeletal:         General: No swelling.      Cervical back: Neck supple.   Skin:     General: Skin is warm and dry.      Capillary Refill: Capillary refill takes 2 to 3 seconds.      Findings: No rash.      Comments: Left foot wound, dressing in place.   Neurological:      Mental Status: He is alert and oriented to person, place, and time.      Motor: Weakness present.      Coordination: Coordination abnormal.      Gait: Gait abnormal.   Psychiatric:         Mood and Affect: Mood normal.         Behavior: Behavior normal.         Thought Content: Thought content normal.          Results Review:  I have reviewed the labs, radiology results, and diagnostic studies.    Laboratory Data:   Results from last 7 days   Lab Units 11/22/24  0336 11/21/24  0359 11/20/24  0645   WBC 10*3/mm3 7.46 6.58 5.94   HEMOGLOBIN g/dL 10.6* 10.7* 10.6*   HEMATOCRIT % 33.4* 33.0* 33.9*   PLATELETS 10*3/mm3 279 262 256        Results from last 7 days   Lab Units 11/22/24  0336 11/21/24  0359 11/20/24  0606 11/18/24  0416 11/17/24  2153   SODIUM mmol/L 138 140 141   < > 138   POTASSIUM mmol/L 4.9 4.6 4.6   < > 4.4   CHLORIDE mmol/L 104 105 106   < > 106   CO2 mmol/L 23.0 24.0 19.0*   < > 19.0*   BUN mg/dL 48* 40* 43*   < > 53*   CREATININE mg/dL 3.00* 2.51* 2.44*   < > 2.83*   CALCIUM mg/dL 8.3* 8.8 8.6   <  > 8.6   BILIRUBIN mg/dL  --   --   --   --  0.3   ALK PHOS U/L  --   --   --   --  134*   ALT (SGPT) U/L  --   --   --   --  19   AST (SGOT) U/L  --   --   --   --  13   GLUCOSE mg/dL 214* 91 115*   < > 348*    < > = values in this interval not displayed.       Culture Data:   Blood Culture   Date Value Ref Range Status   11/18/2024 No growth at 4 days  Preliminary   11/17/2024 No growth at 4 days  Preliminary     Urine Culture   Date Value Ref Range Status   11/17/2024 >100,000 CFU/mL Escherichia coli ESBL (A)  Final       Radiology Data:   Imaging Results (Last 24 Hours)       ** No results found for the last 24 hours. **            I have reviewed the patient's current medications.     Assessment/Plan   Assessment  Active Hospital Problems    Diagnosis     **Acute UTI (urinary tract infection)     Acute encephalopathy     Type 2 diabetes mellitus with hyperglycemia, with long-term current use of insulin     Atrial fibrillation     Chronic heart failure with preserved ejection fraction (HFpEF)     Stage 3b chronic kidney disease     CAD (coronary artery disease)     BPH without obstruction/lower urinary tract symptoms     Diabetic ulcer of left foot associated with type 2 diabetes mellitus     Sleep apnea with use of continuous positive airway pressure (CPAP)     Essential hypertension      HPI . 69-year-old male with past medical history of coronary artery disease, CABG, Afib, diabetes mellitus insulin-dependent, diabetic foot ulcer, hypertension, sleep apnea noncompliant with CPAP, right seventh rib fracture on 10/20/2024, presents emergency room with complaints of mental status changes disorientation suprapubic pain and burning with urination.  He appears ill and debilitated, although he is afebrile.  Blood work shows normal white blood cell count UA is consistent with UTI.  He has had a recent UTI with ESBL.      Treatment Plan     UTI . UTI urine culture-ESBL E. coli.  Normal saline 50 cc an hour.  Invanz  antibiotics.     Nausea/vomiting this morning.  Pepcid IV.  Zofran as needed.  Tums as needed.     Acute on chronic stage IIIb renal failure/metabolic acidosis.  Metabolic acidosis resolved..  Nephrology consult.  Baseline creatinine 8/4/2024 1.6.   Creatinine slight increase..    Ultrasound the kidneys-Simple 4.5 cm right mid renal cyst- Otherwise normal sonographic  appearance of the kidneys, Distended normal-appearing bladder.     Acute encephalopathy due to UTI.  Patient is oriented x 3.     Recent rib fracture and chronic pain . 7 right ribs fracture 10/20/2024.  Percocet as needed.  Colace.     Diabetes mellitus type 2 insulin-dependent with hyperglycemia.  Hemoglobin A1c 13.4.  Insulin regular IV given in the ER will recheck  Continue Lantus 20 units nightly  Humalog 10 units Premeal 3 times daily and Humalog moderate dose sliding scale ACHS  Hypoglycemia protocol . Uncontrolled diabetes discussed with patient.  This to be a chronic management through his primary care physician, discussed the patient.     Diabetic foot ulcers left foot.  Patient follow-up with wound care outpatient Dr. Mireya Hobbs, per patient.  Consult wound care.  See wound image in media section of chart, the wound appears clean with good margins.  Continue local care and podiatry follow-up outpatient     Atrial fibrillation/CAD/chronic diastolic congestive heart failure/CABG/hypertension/hyperlipidemia.  Continue Eliquis 5 mg p.o. twice daily.  Hold Bumex by nephrology.  Lisinopril . Crestor.  Coreg was discontinued during the last hospitalization in August he does not list any other rate controlling medications.  Echocardiogram 4/2/2024-61 - 65%, mild concentric hypertrophy, left ventricular diastolic function is consistent with (grade III w/high LAP), Mildly reduced right ventricular systolic function, left atrial cavity is mildly dilated, mild calcification of the aortic valve, No aortic valve regurgitation is present, No  hemodynamically significant aortic valve stenosis is present.     Obstructive sleep apnea patient noncompliant with CPAP  Patient is on room air.     Anemia.  Hemoglobin stable..  No sign of acute bleed.     DVT prophylaxis patient anticoagulated     Dementia.  Hold Aricept.     Neuropathy.  Lyrica nightly.     Constipation.  Sarah-Colace.     Nausea . Zofran as needed.     Depression/anxiety.  Cut back Cymbalta.    BuSpar as needed.     Prostate hypertrophy.  Flomax.     Nutrition.  Cardiac/diabetic diet.  Hold Vitamin C.  Hold zinc for now.     Deconditioning.  Fall precaution.  PT OT consult.     Blood culture-no growth for 4 days..  Urine culture-E. coli ESBL.  Respiratory panel-negative.     Medical Decision Making  Number and Complexity of problems: UTI/altered mental//diabetes/CAD/left foot wound  Differential Diagnosis: None     Conditions and Status        Condition is unchanged.     [facility] Data  External documents reviewed: None.  Cardiac tracing (EKG, telemetry) interpretation: Irregular, rates 59  Radiology interpretation: Echo  Labs reviewed: Laboratory  Any tests that were considered but not ordered: Lab in a.m.     Decision rules/scores evaluated (example [id number], Wells, etc): None     Discussed with: Patient     Care Planning  Shared decision making: Patient  Code status and discussions: Full code.     Disposition  Social Determinants of Health that impact treatment or disposition: From home.  1 to 5 days              Electronically signed by Telly Rodriguez MD, 11/22/24, 12:54 CST.

## 2024-11-22 NOTE — THERAPY TREATMENT NOTE
Acute Care - Physical Therapy Treatment Note  [facility]     Patient Name: Erick Luong  : 1955  MRN: [id number]  Today's Date: 2024      Visit Dx:     ICD-10-CM ICD-9-CM   1. Acute UTI (urinary tract infection)  N39.0 599.0   2. Acute renal failure superimposed on chronic kidney disease, unspecified acute renal failure type, unspecified CKD stage  N17.9 584.9    N18.9 585.9   3. Acute hyperglycemia  R73.9 790.29   4. Impaired functional mobility and activity tolerance [Z74.09]  Z74.09 V49.89     Patient Active Problem List   Diagnosis    Obesity, unspecified obesity severity, unspecified obesity type    Essential hypertension    Type 2 diabetes mellitus with hyperglycemia, with long-term current use of insulin    Nonsmoker    Anemia due to chronic kidney disease    Class 3 severe obesity due to excess calories with body mass index (BMI) of 40.0 to 44.9 in adult    Anasarca    Sleep apnea with use of continuous positive airway pressure (CPAP)    Medically noncompliant    Diabetic ulcer of left foot associated with type 2 diabetes mellitus    Diabetic polyneuropathy associated with type 2 diabetes mellitus    Spinal stenosis in cervical region    Degeneration of cervical intervertebral disc    Cervical radiculopathy    Degeneration of lumbar or lumbosacral intervertebral disc    Cervical myelopathy    Bilateral carpal tunnel syndrome    CAD (coronary artery disease)    GERD without esophagitis    BPH without obstruction/lower urinary tract symptoms    Stage 3b chronic kidney disease    Chronic diastolic heart failure    Type 2 myocardial infarction due to heart failure    Left carpal tunnel syndrome    Syncope and collapse, recurrent episodes    Poorly-controlled hypertension    Rhinovirus    Peripheral vascular disease    Chronic kidney disease (CKD), stage IV (severe)    Diabetic foot infection    Sepsis    Epigastric pain    Chronic heart failure with preserved ejection fraction (HFpEF)    Sepsis  due to methicillin resistant Staphylococcus aureus (MRSA) with encephalopathy without septic shock    Slow transit constipation    CHF exacerbation    CHF (congestive heart failure), NYHA class I, acute on chronic, combined    Rectal bleeding    Acute on chronic heart failure with preserved ejection fraction (HFpEF)    DKA, type 2, not at goal    Atrial fibrillation    E. coli UTI, ESBL    Acute UTI (urinary tract infection)    Acute encephalopathy    Type 2 diabetes mellitus with hyperglycemia, with long-term current use of insulin     Past Medical History:   Diagnosis Date    Arthritis     Autonomic disease     CAD (coronary artery disease) 02/06/2017    Cervical radiculopathy 09/16/2021    Chronic constipation with acute exaccerbation 05/10/2021    Coronary artery disease     Degeneration of cervical intervertebral disc 08/11/2021    Diabetes mellitus     Diabetic foot ulcer 08/31/2020    Diabetic polyneuropathy associated with type 2 diabetes mellitus 01/18/2021    Elevated cholesterol     Gastroesophageal reflux disease 05/13/2019    Headache     HTN (hypertension), benign 05/03/2017    Hyperlipidemia     Hypertension     Mixed hyperlipidemia 02/07/2017    Multiple lung nodules 01/26/2020    5mm, 9 mm RLL identified 1/2020, not present 10/2019.    Myocardial infarction     Osteomyelitis 01/22/2020    Osteomyelitis of fifth toe of right foot 10/07/2019    Pancreatitis     Persistent insomnia 01/20/2020    Renal disorder     Sleep apnea 02/06/2017    Sleep apnea with use of continuous positive airway pressure (CPAP)     NON-COMPLIANT    Slow transit constipation 01/16/2019    Spinal stenosis in cervical region 09/16/2021    Vitamin D deficiency 03/02/2021     Past Surgical History:   Procedure Laterality Date    ABDOMINAL SURGERY      AMPUTATION FOOT / TOE Left 10/2021    5th digit     ANTERIOR CERVICAL DISCECTOMY W/ FUSION N/A 08/05/2022    Procedure: CERVICAL DISCECTOMY ANTERIOR WITH FUSION C5-6 with possible  plating of C5-7 with neuromonitoring and 1 c-arm;  Surgeon: Karel Soliz MD;  Location: [facility] OR;  Service: Neurosurgery;  Laterality: N/A;    APPENDECTOMY      BACK SURGERY      CARDIAC CATHETERIZATION Left 02/08/2021    Procedure: Left Heart Cath w poss intervention left anatomical snuff box acess;  Surgeon: Omkar Charles DO;  Location: [facility] CATH INVASIVE LOCATION;  Service: Cardiology;  Laterality: Left;    CARDIAC SURGERY      CATARACT EXTRACTION      CERVICAL SPINE SURGERY      COLONOSCOPY N/A 01/31/2017    Normal exam repeat in 5 years    COLONOSCOPY N/A 02/11/2019    Mild acute inflammation    COLONOSCOPY N/A 04/07/2024    2 areas at 10 and 20 cm with friability ulceration 2 clips placed at 20 cm and 4 clips at 10 cm poor prep normal mucosa,mild eroisions and ulcerations in visible vessels    COLONOSCOPY N/A 7/1/2024    Procedure: COLONOSCOPY WITH ANESTHESIA;  Surgeon: Arsalan Lorenzo DO;  Location: [facility] ENDOSCOPY;  Service: Gastroenterology;  Laterality: N/A;  pre op constipation/diarrhea  post poor prep  pcp Del Shetty    COLONOSCOPY N/A 7/2/2024    Procedure: COLONOSCOPY WITH ANESTHESIA;  Surgeon: Agapito Christopher MD;  Location: [facility] ENDOSCOPY;  Service: Gastroenterology;  Laterality: N/A;  pre rectal bleeding  post poor prep  pcp Del Shetty MD    COLONOSCOPY W/ POLYPECTOMY  03/04/2014    Hyperplastic polyp    CORONARY ARTERY BYPASS GRAFT  10/2015    ENDOSCOPY  04/13/2011    Gastritis with hemorrhage    ENDOSCOPY N/A 05/05/2017    Normal exam    ENDOSCOPY N/A 02/11/2019    Gastritis    ENDOSCOPY N/A 09/01/2020    Non-erosive gastritis with hemorrhage    ENDOSCOPY N/A 02/10/2021    Esophagitis    ENDOSCOPY N/A 04/11/2024    There were esophageal mucosal changes suspicious for short-segment Low's esophagus present in the distal esophagus. The maximum longitudinal extent of these mucosal changes was 2 cm in length. Mucosa was biopsied with a cold forceps for  histologyDistal esophagus, biopsies: Mild chronic active esophagogastritis. No evidence of intestinal metaplasia, dysplasia. Antrum, bx, Mild chronic gastritis    FOOT SURGERY Left     INCISION AND DRAINAGE OF WOUND Left 09/2007    spider bite     PT Assessment (Last 12 Hours)       PT Evaluation and Treatment       Row Name 11/22/24 0808          Physical Therapy Time and Intention    Subjective Information complains of;pain  -AE     Document Type therapy note (daily note)  -AE     Mode of Treatment physical therapy  -AE       Row Name 11/22/24 0808          General Information    Existing Precautions/Restrictions fall  CAM boot to L LE to ambulate  -AE       Row Name 11/22/24 0808          Pain    Pretreatment Pain Rating 4/10  -AE     Posttreatment Pain Rating 6/10  -AE     Pain Location abdomen  -AE     Pain Side/Orientation right  flank  -AE     Pain Management Interventions positioning techniques utilized  pillow under R arm.Pt declines pain meds at this time  -AE     Response to Pain Interventions activity participation with increased pain  -AE       Row Name 11/22/24 0808          Bed Mobility    Supine-Sit [place] (Bed Mobility) standby assist;verbal cues  -AE       Row Name 11/22/24 0808          Sit-Stand Transfer    Sit-Stand [place] (Transfers) contact guard;verbal cues  practiced sit to stand x 5  -AE       Row Name 11/22/24 0808          Stand-Sit Transfer    Stand-Sit [place] (Transfers) standby assist;verbal cues  -AE       Row Name 11/22/24 0808          Gait/Stairs (Locomotion)    [place] Level (Gait) contact guard  CAM boot donned L foot  -AE     Assistive Device (Gait) walker, front-wheeled  -AE     Distance in Feet (Gait) 30  -AE       Row Name 11/22/24 0808          Knee (Therapeutic Exercise)    Knee (Therapeutic Exercise) AROM (active range of motion)  -AE     Knee AROM (Therapeutic Exercise) LAQ (long arc quad);10 repetitions  -AE       Row Name             Wound  08/22/23 0140 Left posterior plantar    Wound - Properties Group Placement Date: 08/22/23  -AM Placement Time: 0140  -AM Side: Left  -AM Orientation: posterior  -AM Location: plantar  -AM Present on Original Admission: Y  -AM    Retired Wound - Properties Group Placement Date: 08/22/23  -AM Placement Time: 0140  -AM Present on Original Admission: Y  -AM Side: Left  -AM Orientation: posterior  -AM Location: plantar  -AM    Retired Wound - Properties Group Placement Date: 08/22/23  -AM Placement Time: 0140  -AM Present on Original Admission: Y  -AM Side: Left  -AM Orientation: posterior  -AM Location: plantar  -AM    Retired Wound - Properties Group Date first assessed: 08/22/23  -AM Time first assessed: 0140  -AM Present on Original Admission: Y  -AM Side: Left  -AM Location: plantar  -AM      Row Name             Wound 06/15/23 0102 Left anterior plantar    Wound - Properties Group Placement Date: 06/15/23  -SM Placement Time: 0102  -SM Side: Left  -SM Orientation: anterior  -SM Location: plantar  -SM Removal Date: --  -JR Removal Time: --  -JR    Retired Wound - Properties Group Placement Date: 06/15/23  -SM Placement Time: 0102  -SM Side: Left  -SM Orientation: anterior  -SM Location: plantar  -SM Removal Date: --  -JR Removal Time: --  -JR    Retired Wound - Properties Group Placement Date: 06/15/23  -SM Placement Time: 0102  -SM Side: Left  -SM Orientation: anterior  -SM Location: plantar  -SM Removal Date: --  -JR Removal Time: --  -JR    Retired Wound - Properties Group Date first assessed: 06/15/23  -SM Time first assessed: 0102  -SM Side: Left  -SM Location: plantar  -SM Resolution Date: --  -JR Resolution Time: --  -JR      Row Name             Wound 11/18/24 0046 Left lower arm skin tear;abrasion    Wound - Properties Group Placement Date: 11/18/24  -JR Placement Time: 0046  -JR Side: Left  -JR Orientation: lower  -JR Location: arm  -JR Primary Wound Type: Abrasion  -JR Type: skin tear;abrasion  -JR  Present on Original Admission: Y  -JR    Retired Wound - Properties Group Placement Date: 11/18/24  -JR Placement Time: 0046 -JR Present on Original Admission: Y  -JR Side: Left  -JR Orientation: lower  -JR Location: arm  -JR Primary Wound Type: Abrasion  -JR Type: skin tear;abrasion  -JR    Retired Wound - Properties Group Placement Date: 11/18/24  -JR Placement Time: 0046 -JR Present on Original Admission: Y  -JR Side: Left  -JR Orientation: lower  -JR Location: arm  -JR Primary Wound Type: Abrasion  -JR Type: skin tear;abrasion  -JR    Retired Wound - Properties Group Date first assessed: 11/18/24  -JR Time first assessed: 0046 -JR Present on Original Admission: Y  -JR Side: Left  -JR Location: arm  -JR Primary Wound Type: Abrasion  -JR Type: skin tear;abrasion  -JR      Row Name             Wound 11/18/24 0056 Left medial foot diabetic ulcer    Wound - Properties Group Placement Date: 11/18/24  -JR Placement Time: 0056 -JR Side: Left  -JR Orientation: medial  -JR Location: foot  -JR Primary Wound Type: Diabetic ulc  -JR Type: diabetic ulcer  -JR Present on Original Admission: Y  -JR    Retired Wound - Properties Group Placement Date: 11/18/24  -JR Placement Time: 0056 -JR Present on Original Admission: Y  -JR Side: Left  -JR Orientation: medial  -JR Location: foot  -JR Primary Wound Type: Diabetic ulc  -JR Type: diabetic ulcer  -JR    Retired Wound - Properties Group Placement Date: 11/18/24  -JR Placement Time: 0056 -JR Present on Original Admission: Y  -JR Side: Left  -JR Orientation: medial  -JR Location: foot  -JR Primary Wound Type: Diabetic ulc  -JR Type: diabetic ulcer  -JR    Retired Wound - Properties Group Date first assessed: 11/18/24  -JR Time first assessed: 0056 -JR Present on Original Admission: Y  -JR Side: Left  -JR Location: foot  -JR Primary Wound Type: Diabetic ulc  -JR Type: diabetic ulcer  -JR      Row Name 11/22/24 0808          Positioning and Restraints    Pre-Treatment Position  in bed  -AE     Post Treatment Position chair  -AE     In Bed sitting;call light within reach;encouraged to call for assist;exit alarm on  -AE               User Key  (r) = Recorded By, (t) = Taken By, (c) = Cosigned By      Initials Name Provider Type    AE Hansa Arambula PTA Physical Therapist Assistant    Faviola Kunz RN Registered Nurse    Michelle Diaz, RN Registered Nurse    Sabas Young LPNA Licensed Nurse    Sabas Young, RN Registered Nurse                    Physical Therapy Education       Title: PT OT SLP Therapies (In Progress)       Topic: Physical Therapy (Not Started)       Point: Mobility training (In Progress)       Learning Progress Summary            Patient Acceptance, E, NR by AE at 11/21/2024 0850    Comment: NWB L LE and need for CAM boot    Acceptance, E, VU by JACIEL at 11/19/2024 0958    Comment: limited gait until boot for RLE    Acceptance, E,TB,D, VU,NR by  at 11/18/2024 1128    Comment: education re: purpose of PT/importance of activity, safety/falls prevention, NWB L LE due to open wounds, improved tech w/ tfers, positioning w/ L LE elevated                      Point: Home exercise program (In Progress)       Learning Progress Summary            Patient Acceptance, E, NR by AE at 11/21/2024 0850    Comment: NWB L LE and need for CAM boot                      Point: Precautions (Done)       Learning Progress Summary            Patient Acceptance, E,TB,D, VU,NR by  at 11/18/2024 1128    Comment: education re: purpose of PT/importance of activity, safety/falls prevention, NWB L LE due to open wounds, improved tech w/ tfers, positioning w/ L LE elevated                                      User Key       Initials Effective Dates Name Provider Type Discipline    AE 02/03/23 -  Hansa Arambula PTA Physical Therapist Assistant PT    JACIEL 02/03/23 -  Sabas Maharaj PTA Physical Therapist Assistant PT    NIKO 08/02/18 -  Melly Mustafa PT Physical  Therapist PT                  PT Recommendation and Plan     Progress: improving  Outcome Evaluation: Pt was SBA supine to sit and c/o R flank pain and abdomen pain 6/10 post PT.PTA donned CAM boot on L LE.He was CGA to stand and to walk with RW 30ft with no LOB.Pt completed AROM B LE ex's and practiced sit to stand x 5 reps.Pt would benefit from HH PT.   Outcome Measures       Row Name 11/21/24 0828 11/19/24 0958          How much help from another person do you currently need...    Turning from your back to your side while in flat bed without using bedrails? 4  -AE 4  -JACIEL     Moving from lying on back to sitting on the side of a flat bed without bedrails? 4  -AE 4  -JACIEL     Moving to and from a bed to a chair (including a wheelchair)? 4  -AE 3  -JACIEL     Standing up from a chair using your arms (e.g., wheelchair, bedside chair)? 4  -AE 3  -JACIEL     Climbing 3-5 steps with a railing? 2  -AE 1  -JACIEL     To walk in hospital room? 2  -AE 2  -JACIEL     AM-PAC 6 Clicks Score (PT) 20  -AE 17  -JACIEL        Functional Assessment    Outcome Measure Options AM-PAC 6 Clicks Basic Mobility (PT)  -AE AM-PAC 6 Clicks Basic Mobility (PT)  -JACIEL               User Key  (r) = Recorded By, (t) = Taken By, (c) = Cosigned By      Initials Name Provider Type    AE Hansa Arambula, PTA Physical Therapist Assistant    Sabas Ervin, PARUL Physical Therapist Assistant                     Time Calculation:    PT Charges       Row Name 11/22/24 0843             Time Calculation    Start Time 0807  -AE      Stop Time 0830  -AE      Time Calculation (min) 23 min  -AE      PT Received On 11/22/24  -AE      PT Goal Re-Cert Due Date 11/28/24  -AE         Time Calculation- PT    Total Timed Code Minutes- PT 23 minute(s)  -AE         Timed Charges    56659 - Gait Training Minutes  15  -AE      15426 - PT Therapeutic Activity Minutes 8  -AE         Total Minutes    Timed Charges Total Minutes 23  -AE       Total Minutes 23  -AE                User Key  (r)  = Recorded By, (t) = Taken By, (c) = Cosigned By      Initials Name Provider Type    AE Hansa Arambula PTA Physical Therapist Assistant                  Therapy Charges for Today       Code Description Service Date Service Provider Modifiers Qty    84932436017 HC PT THERAPEUTIC ACT EA 15 MIN 11/21/2024 Hansa Arambula, PARUL GP 1    39516238133 HC PT THER PROC EA 15 MIN 11/21/2024 Hansa Arambula, PARUL GP 1    46623083700 HC PT THERAPEUTIC ACT EA 15 MIN 11/22/2024 Hansa Arambula PTA GP 1    07495324457 HC GAIT TRAINING EA 15 MIN 11/22/2024 Hansa Arambula, PARUL GP 1            PT G-Codes  Outcome Measure Options: AM-PAC 6 Clicks Daily Activity (OT)  AM-PAC 6 Clicks Score (PT): 20  AM-PAC 6 Clicks Score (OT): 22    Hansa Arambula PTA  11/22/2024

## 2024-11-22 NOTE — PLAN OF CARE
Goal Outcome Evaluation:  Plan of Care Reviewed With: patient, spouse           Outcome Evaluation: OT tx completed. A&O x4, c/o pain 7/10 through lower abdomen. Completes bed mobility w/ mod I, transfers and func mob with FWW and SBA. Completes doffing of walking boot sitting EOB. Sitting EOB, completes various BUE exercises for improved BUE strength. Left fowlers in bed with nsg notified, call light. Cont OT POC

## 2024-11-23 LAB
ANION GAP SERPL CALCULATED.3IONS-SCNC: 12 MMOL/L (ref 5–15)
BACTERIA SPEC AEROBE CULT: NORMAL
BACTERIA UR QL AUTO: ABNORMAL /HPF
BILIRUB UR QL STRIP: NEGATIVE
BUN SERPL-MCNC: 52 MG/DL (ref 8–23)
BUN/CREAT SERPL: 19.1 (ref 7–25)
CALCIUM SPEC-SCNC: 8.3 MG/DL (ref 8.6–10.5)
CHLORIDE SERPL-SCNC: 107 MMOL/L (ref 98–107)
CLARITY UR: CLEAR
CO2 SERPL-SCNC: 21 MMOL/L (ref 22–29)
COLOR UR: YELLOW
CREAT SERPL-MCNC: 2.72 MG/DL (ref 0.76–1.27)
DEPRECATED RDW RBC AUTO: 51.8 FL (ref 37–54)
EGFRCR SERPLBLD CKD-EPI 2021: 24.7 ML/MIN/1.73
ERYTHROCYTE [DISTWIDTH] IN BLOOD BY AUTOMATED COUNT: 16 % (ref 12.3–15.4)
GLUCOSE BLDC GLUCOMTR-MCNC: 124 MG/DL (ref 70–130)
GLUCOSE BLDC GLUCOMTR-MCNC: 131 MG/DL (ref 70–130)
GLUCOSE BLDC GLUCOMTR-MCNC: 194 MG/DL (ref 70–130)
GLUCOSE BLDC GLUCOMTR-MCNC: 271 MG/DL (ref 70–130)
GLUCOSE SERPL-MCNC: 144 MG/DL (ref 65–99)
GLUCOSE UR STRIP-MCNC: NEGATIVE MG/DL
HCT VFR BLD AUTO: 34 % (ref 37.5–51)
HGB BLD-MCNC: 10.8 G/DL (ref 13–17.7)
HGB UR QL STRIP.AUTO: NEGATIVE
HYALINE CASTS UR QL AUTO: ABNORMAL /LPF
KETONES UR QL STRIP: NEGATIVE
LEUKOCYTE ESTERASE UR QL STRIP.AUTO: NEGATIVE
MCH RBC QN AUTO: 28.1 PG (ref 26.6–33)
MCHC RBC AUTO-ENTMCNC: 31.8 G/DL (ref 31.5–35.7)
MCV RBC AUTO: 88.3 FL (ref 79–97)
NITRITE UR QL STRIP: NEGATIVE
PH UR STRIP.AUTO: <=5 [PH] (ref 5–8)
PLATELET # BLD AUTO: 256 10*3/MM3 (ref 140–450)
PMV BLD AUTO: 11.2 FL (ref 6–12)
POTASSIUM SERPL-SCNC: 4.4 MMOL/L (ref 3.5–5.2)
PROT UR QL STRIP: ABNORMAL
RBC # BLD AUTO: 3.85 10*6/MM3 (ref 4.14–5.8)
RBC # UR STRIP: ABNORMAL /HPF
REF LAB TEST METHOD: ABNORMAL
SODIUM SERPL-SCNC: 140 MMOL/L (ref 136–145)
SP GR UR STRIP: 1.02 (ref 1–1.03)
SQUAMOUS #/AREA URNS HPF: ABNORMAL /HPF
UROBILINOGEN UR QL STRIP: ABNORMAL
WBC # UR STRIP: ABNORMAL /HPF
WBC NRBC COR # BLD AUTO: 6.24 10*3/MM3 (ref 3.4–10.8)

## 2024-11-23 PROCEDURE — 25810000003 LACTATED RINGERS PER 1000 ML: Performed by: INTERNAL MEDICINE

## 2024-11-23 PROCEDURE — 85027 COMPLETE CBC AUTOMATED: CPT | Performed by: FAMILY MEDICINE

## 2024-11-23 PROCEDURE — 63710000001 INSULIN LISPRO (HUMAN) PER 5 UNITS: Performed by: FAMILY MEDICINE

## 2024-11-23 PROCEDURE — 81001 URINALYSIS AUTO W/SCOPE: CPT | Performed by: FAMILY MEDICINE

## 2024-11-23 PROCEDURE — 25010000002 ERTAPENEM PER 500 MG: Performed by: FAMILY MEDICINE

## 2024-11-23 PROCEDURE — 80048 BASIC METABOLIC PNL TOTAL CA: CPT | Performed by: FAMILY MEDICINE

## 2024-11-23 PROCEDURE — 82948 REAGENT STRIP/BLOOD GLUCOSE: CPT

## 2024-11-23 PROCEDURE — 25010000002 ONDANSETRON PER 1 MG: Performed by: FAMILY MEDICINE

## 2024-11-23 RX ORDER — SODIUM CHLORIDE, SODIUM LACTATE, POTASSIUM CHLORIDE, CALCIUM CHLORIDE 600; 310; 30; 20 MG/100ML; MG/100ML; MG/100ML; MG/100ML
75 INJECTION, SOLUTION INTRAVENOUS CONTINUOUS
Status: DISPENSED | OUTPATIENT
Start: 2024-11-23 | End: 2024-11-24

## 2024-11-23 RX ADMIN — INSULIN LISPRO 6 UNITS: 100 INJECTION, SOLUTION INTRAVENOUS; SUBCUTANEOUS at 20:52

## 2024-11-23 RX ADMIN — Medication 10 ML: at 20:52

## 2024-11-23 RX ADMIN — SODIUM CHLORIDE, POTASSIUM CHLORIDE, SODIUM LACTATE AND CALCIUM CHLORIDE 75 ML/HR: 600; 310; 30; 20 INJECTION, SOLUTION INTRAVENOUS at 17:07

## 2024-11-23 RX ADMIN — TAMSULOSIN HYDROCHLORIDE 0.4 MG: 0.4 CAPSULE ORAL at 09:04

## 2024-11-23 RX ADMIN — DOCUSATE SODIUM 50 MG AND SENNOSIDES 8.6 MG 1 TABLET: 8.6; 5 TABLET, FILM COATED ORAL at 20:52

## 2024-11-23 RX ADMIN — ONDANSETRON 4 MG: 2 INJECTION INTRAMUSCULAR; INTRAVENOUS at 11:18

## 2024-11-23 RX ADMIN — SODIUM CHLORIDE, POTASSIUM CHLORIDE, SODIUM LACTATE AND CALCIUM CHLORIDE 75 ML/HR: 600; 310; 30; 20 INJECTION, SOLUTION INTRAVENOUS at 03:45

## 2024-11-23 RX ADMIN — DULOXETINE HYDROCHLORIDE 30 MG: 30 CAPSULE, DELAYED RELEASE ORAL at 09:04

## 2024-11-23 RX ADMIN — OXYCODONE HYDROCHLORIDE AND ACETAMINOPHEN 1 TABLET: 5; 325 TABLET ORAL at 11:18

## 2024-11-23 RX ADMIN — OXYCODONE HYDROCHLORIDE AND ACETAMINOPHEN 1 TABLET: 5; 325 TABLET ORAL at 21:04

## 2024-11-23 RX ADMIN — PREGABALIN 75 MG: 75 CAPSULE ORAL at 20:52

## 2024-11-23 RX ADMIN — FAMOTIDINE 20 MG: 10 INJECTION INTRAVENOUS at 09:04

## 2024-11-23 RX ADMIN — ROSUVASTATIN 10 MG: 10 TABLET, FILM COATED ORAL at 09:04

## 2024-11-23 RX ADMIN — Medication 1 TABLET: at 09:04

## 2024-11-23 RX ADMIN — APIXABAN 5 MG: 5 TABLET, FILM COATED ORAL at 20:52

## 2024-11-23 RX ADMIN — LISINOPRIL 5 MG: 10 TABLET ORAL at 09:04

## 2024-11-23 RX ADMIN — INSULIN LISPRO 2 UNITS: 100 INJECTION, SOLUTION INTRAVENOUS; SUBCUTANEOUS at 17:07

## 2024-11-23 RX ADMIN — DOCUSATE SODIUM 100 MG: 100 CAPSULE, LIQUID FILLED ORAL at 09:04

## 2024-11-23 RX ADMIN — ERTAPENEM 1000 MG: 1 INJECTION INTRAMUSCULAR; INTRAVENOUS at 15:29

## 2024-11-23 RX ADMIN — DOCUSATE SODIUM 100 MG: 100 CAPSULE, LIQUID FILLED ORAL at 21:04

## 2024-11-23 RX ADMIN — Medication 10 ML: at 09:04

## 2024-11-23 RX ADMIN — OXYCODONE HYDROCHLORIDE AND ACETAMINOPHEN 1 TABLET: 5; 325 TABLET ORAL at 16:17

## 2024-11-23 RX ADMIN — APIXABAN 5 MG: 5 TABLET, FILM COATED ORAL at 09:04

## 2024-11-23 NOTE — PROGRESS NOTES
[facility] Medicine Services  INPATIENT PROGRESS NOTE    Patient Name: Erick Luong  Date of Admission: 11/17/2024  Today's Date: 11/23/24  Length of Stay: 4  Primary Care Physician: Del Shetty MD    Subjective   Chief Complaint: UTI/failure to thrive/foot wound.   HPI   Blood pressure stable, afebrile.  Discussed patient 7 days of Invanz, repeat urinalysis today to make sure is clear.  Kidney functions improving today.  Hemoglobin stable.  Patient is on room air.    Review of Systems   Constitutional:  Positive for activity change, appetite change and fatigue. Negative for chills and fever.   HENT:  Negative for hearing loss, nosebleeds, tinnitus and trouble swallowing.    Eyes:  Negative for visual disturbance.   Respiratory:  Negative for cough, chest tightness, shortness of breath and wheezing.    Cardiovascular:  Negative for chest pain, palpitations and leg swelling.   Gastrointestinal:  Negative for abdominal distention, abdominal pain, blood in stool, constipation, diarrhea, nausea and vomiting.   Endocrine: Negative for cold intolerance, heat intolerance, polydipsia, polyphagia and polyuria.   Genitourinary:  Negative for decreased urine volume, difficulty urinating, dysuria, flank pain, frequency and hematuria.   Musculoskeletal:  Positive for arthralgias, gait problem and myalgias. Negative for joint swelling.   Skin:  Negative for rash.   Allergic/Immunologic: Negative for immunocompromised state.   Neurological:  Positive for weakness. Negative for dizziness, syncope, light-headedness and headaches.   Hematological:  Negative for adenopathy. Does not bruise/bleed easily.   Psychiatric/Behavioral:  Negative for confusion and sleep disturbance. The patient is not nervous/anxious.    All pertinent negatives and positives are as above. All other systems have been reviewed and are negative unless otherwise stated.     Objective    Temp:  [97.6 °F (36.4 °C)-98.4 °F  (36.9 °C)] 98.4 °F (36.9 °C)  Heart Rate:  [61-72] 68  Resp:  [16] 16  BP: (113-135)/(56-77) 135/77  Physical Exam  Vitals and nursing note reviewed.   Constitutional:       Comments: Chronically ill.   HENT:      Head: Normocephalic.   Eyes:      Conjunctiva/sclera: Conjunctivae normal.      Pupils: Pupils are equal, round, and reactive to light.   Cardiovascular:      Rate and Rhythm: Heart rates in 68s.. Rhythm irregular.      Heart sounds: Normal heart sounds.   Pulmonary:      Effort: No respiratory distress.   Abdominal:      General: Bowel sounds are normal. There is no distension.      Palpations: Abdomen is soft.      Tenderness: There is no abdominal tenderness.      Comments: Obesity.   Musculoskeletal:         General: No swelling.      Cervical back: Neck supple.   Skin:     General: Skin is warm and dry.      Capillary Refill: Capillary refill takes 2 to 3 seconds.      Findings: No rash.      Comments: Left foot wound, dressing in place.   Neurological:      Mental Status: He is alert and oriented to person, place, and time.      Motor: Weakness present.      Coordination: Coordination abnormal.      Gait: Gait abnormal.   Psychiatric:         Mood and Affect: Mood normal.         Behavior: Behavior normal.         Thought Content: Thought content normal.                Results Review:  I have reviewed the labs, radiology results, and diagnostic studies.    Laboratory Data:   Results from last 7 days   Lab Units 11/23/24  0604 11/22/24  0336 11/21/24  0359   WBC 10*3/mm3 6.24 7.46 6.58   HEMOGLOBIN g/dL 10.8* 10.6* 10.7*   HEMATOCRIT % 34.0* 33.4* 33.0*   PLATELETS 10*3/mm3 256 279 262        Results from last 7 days   Lab Units 11/23/24  0604 11/22/24  0336 11/21/24  0359 11/18/24  0416 11/17/24  2153   SODIUM mmol/L 140 138 140   < > 138   POTASSIUM mmol/L 4.4 4.9 4.6   < > 4.4   CHLORIDE mmol/L 107 104 105   < > 106   CO2 mmol/L 21.0* 23.0 24.0   < > 19.0*   BUN mg/dL 52* 48* 40*   < > 53*    CREATININE mg/dL 2.72* 3.00* 2.51*   < > 2.83*   CALCIUM mg/dL 8.3* 8.3* 8.8   < > 8.6   BILIRUBIN mg/dL  --   --   --   --  0.3   ALK PHOS U/L  --   --   --   --  134*   ALT (SGPT) U/L  --   --   --   --  19   AST (SGOT) U/L  --   --   --   --  13   GLUCOSE mg/dL 144* 214* 91   < > 348*    < > = values in this interval not displayed.       Culture Data:   Blood Culture   Date Value Ref Range Status   11/18/2024 No growth at 5 days  Final   11/17/2024 No growth at 5 days  Final     Urine Culture   Date Value Ref Range Status   11/17/2024 >100,000 CFU/mL Escherichia coli ESBL (A)  Final       Radiology Data:   Imaging Results (Last 24 Hours)       ** No results found for the last 24 hours. **            I have reviewed the patient's current medications.     Assessment/Plan   Assessment  Active Hospital Problems    Diagnosis     **Acute UTI (urinary tract infection)     Acute encephalopathy     Type 2 diabetes mellitus with hyperglycemia, with long-term current use of insulin     Atrial fibrillation     Chronic heart failure with preserved ejection fraction (HFpEF)     Stage 3b chronic kidney disease     CAD (coronary artery disease)     BPH without obstruction/lower urinary tract symptoms     Diabetic ulcer of left foot associated with type 2 diabetes mellitus     Sleep apnea with use of continuous positive airway pressure (CPAP)     Essential hypertension        HPI . 69-year-old male with past medical history of coronary artery disease, CABG, Afib, diabetes mellitus insulin-dependent, diabetic foot ulcer, hypertension, sleep apnea noncompliant with CPAP, right seventh rib fracture on 10/20/2024, presents emergency room with complaints of mental status changes disorientation suprapubic pain and burning with urination.  He appears ill and debilitated, although he is afebrile.  Blood work shows normal white blood cell count UA is consistent with UTI.  He has had a recent UTI with ESBL.      Treatment Plan     UTI .  UTI urine culture-ESBL E. coli.  Normal saline 50 cc an hour.  Invanz antibiotics x 7 days.  Repeat UA today to make sure infection is clear.     Nausea/vomiting this morning.  Pepcid IV.  Zofran as needed.  Tums as needed.     Acute on chronic stage IIIb renal failure/metabolic acidosis.  Nephrology consult.  Baseline creatinine 8/4/2024 1.6.   Creatinine improving.  Ultrasound the kidneys-Simple 4.5 cm right mid renal cyst- Otherwise normal sonographic  appearance of the kidneys, Distended normal-appearing bladder.     Acute encephalopathy due to UTI.  Patient is oriented x 3.     Recent rib fracture and chronic pain . 7 right ribs fracture 10/20/2024.  Percocet as needed.  Colace.     Diabetes mellitus type 2 insulin-dependent with hyperglycemia.  Hemoglobin A1c 13.4.  Insulin regular IV given in the ER will recheck  Continue Lantus 20 units nightly  Humalog 10 units Premeal 3 times daily and Humalog moderate dose sliding scale ACHS  Hypoglycemia protocol . Uncontrolled diabetes discussed with patient.  This to be a chronic management through his primary care physician, discussed the patient.     Diabetic foot ulcers left foot.  Patient follow-up with wound care outpatient Dr. Mireya Hobbs, per patient.  Consult wound care.  See wound image in media section of chart, the wound appears clean with good margins.  Continue local care and podiatry follow-up outpatient     Atrial fibrillation/CAD/chronic diastolic congestive heart failure/CABG/hypertension/hyperlipidemia.  Continue Eliquis 5 mg p.o. twice daily.  Hold Bumex by nephrology.  Lisinopril . Crestor.  Coreg was discontinued during the last hospitalization in August he does not list any other rate controlling medications.  Echocardiogram 4/2/2024-61 - 65%, mild concentric hypertrophy, left ventricular diastolic function is consistent with (grade III w/high LAP), Mildly reduced right ventricular systolic function, left atrial cavity is mildly dilated, mild  calcification of the aortic valve, No aortic valve regurgitation is present, No hemodynamically significant aortic valve stenosis is present.     Obstructive sleep apnea patient noncompliant with CPAP  Patient is on room air.     Anemia.  Hemoglobin stable..  No sign of acute bleed.     DVT prophylaxis patient anticoagulated     Dementia.  Hold Aricept.     Neuropathy.  Lyrica nightly.     Constipation.  Sarah-Colace.     Nausea . Zofran as needed.     Depression/anxiety.  Cut back Cymbalta.    BuSpar as needed.     Prostate hypertrophy.  Flomax.     Nutrition.  Cardiac/diabetic diet.  Hold Vitamin C.  Hold zinc for now.     Deconditioning.  Fall precaution.  PT OT consult.     Blood culture-no growth for 5 days..  Urine culture-E. coli ESBL.  Respiratory panel-negative.     Medical Decision Making  Number and Complexity of problems: UTI/altered mental//diabetes/CAD/left foot wound  Differential Diagnosis: None     Conditions and Status        Condition is unchanged.     [facility] Data  External documents reviewed: None.  Cardiac tracing (EKG, telemetry) interpretation: Irregular, rates 59  Radiology interpretation: Echo  Labs reviewed: Laboratory  Any tests that were considered but not ordered: Lab in a.m.     Decision rules/scores evaluated (example [id number], [place], etc): None     Discussed with: Patient     Care Planning  Shared decision making: Patient  Code status and discussions: Full code.     Disposition  Social Determinants of Health that impact treatment or disposition: From home.  1 to 3 days           Electronically signed by Telly Rodriguez MD, 11/23/24, 10:32 CST.

## 2024-11-23 NOTE — PLAN OF CARE
Goal Outcome Evaluation:  Plan of Care Reviewed With: patient, spouse        Progress: improving  Outcome Evaluation: A&OX4, VSS. C/o back pain, prn pain med given with relief. Assist x1 with Left cam boot to ambulate. Chronic foot wounds, drsgs changed daily. ACHS accuchecks, on room air. IVF d/c'. IV ABX given. Voiding, Contact Isolation maintained. Repeat UA sent and results WNL. Alarms set, call light in reach. Safety maintained.

## 2024-11-23 NOTE — PROGRESS NOTES
Nephrology ([facility]) Progress Note      Patient:  Erick Luong  YOB: 1955  Date of Service: 11/23/2024  MRN: [id number]   Acct: [id number]   Primary Care Physician: Del Shetty MD  Advance Directive:   Code Status and Medical Interventions: CPR (Attempt to Resuscitate); Full Support   Ordered at: 11/18/24 0159     Code Status (Patient has no pulse and is not breathing):    CPR (Attempt to Resuscitate)     Medical Interventions (Patient has pulse or is breathing):    Full Support     Admit Date: 11/17/2024       Hospital Day: 4  Referring Provider: Garrett Alicia,*      Patient personally seen and examined.  Complete chart including Consults, Notes, Operative Reports, Labs, Cardiology, and Radiology studies reviewed as able.        Subjective:  Erick Luong is a 69 y.o. male for whom we were consulted for evaluation and treatment of acute kidney injury with history of chronic kidney disease. Baseline chronic kidney disease stage 3b. Baseline creatinine approximately 1.5-1.8. Follows in our office.  History of poorly controlled type 2 diabetes, hypertension, coronary artery disease, diabetic foot ulcer, obesity.  Patient was admitted with complaints of cystitis and failure to thrive along with foot wound.  He had had no dietary intake on the day of consult per review of notes.  He denies specific complaints upon questioning.  No evident peripheral edema.     Today, no overnight events.  Family not present.  Patient denied new complaints upon questioning.  Still with limited reported oral intake    Allergies:  Cefepime, Bactrim [sulfamethoxazole-trimethoprim], Vancomycin, Zolpidem, and Metronidazole    Home Meds:  Medications Prior to Admission   Medication Sig Dispense Refill Last Dose/Taking    apixaban (Eliquis) 5 MG tablet tablet Take 1 tablet by mouth 2 (Two) Times a Day. 60 tablet 0 Taking    ascorbic acid (VITAMIN C) 1000 MG tablet Take 1 tablet by  mouth Daily. 30 tablet 3 Taking    bumetanide (BUMEX) 2 MG tablet Take 1 tablet by mouth 2 (Two) Times a Day. 6 tablet 3 Taking    busPIRone (BUSPAR) 10 MG tablet Take 1 tablet by mouth 3 (Three) Times a Day As Needed. (Patient taking differently: Take 1 tablet by mouth 3 (Three) Times a Day As Needed (anxiety).) 270 tablet 4 Taking Differently    donepezil (ARICEPT) 10 MG tablet Take 1 tablet by mouth every night at bedtime. 30 tablet 0 Taking    DULoxetine (CYMBALTA) 60 MG capsule Take 1 capsule by mouth Daily. 30 capsule 0 Taking    famotidine (PEPCID) 20 MG tablet Take 1 tablet by mouth 2 (Two) Times a Day. 60 tablet 0 Taking    insulin glargine (Lantus) 100 UNIT/ML injection Inject 35 Units under the skin into the appropriate area as directed every night at bedtime. 10 mL 0 Taking    lisinopril (PRINIVIL,ZESTRIL) 5 MG tablet Take 1 tablet by mouth Daily. 30 tablet 0 Taking    melatonin 3 MG tablet Take 2 tablets by mouth At Night As Needed for Sleep. 30 tablet 0 Taking As Needed    midodrine (PROAMATINE) 2.5 MG tablet Take 1 tablet by mouth 2 (Two) Times a Day. 60 tablet 5 Taking    oxyCODONE (ROXICODONE) 10 MG tablet Take 1 tablet by mouth 2 (Two) Times a Day As Needed. (Patient taking differently: Take 1 tablet by mouth 2 (Two) Times a Day As Needed for Moderate Pain or Severe Pain. *must last 30 days*) 55 tablet 0 Taking Differently    pantoprazole (PROTONIX) 40 MG EC tablet Take 1 tablet by mouth 2 (Two) Times a Day. 60 tablet 0 Taking    polyethylene glycol (MIRALAX) 17 GM/SCOOP powder Take 17 g by mouth Daily As Needed (constipation).   Taking As Needed    potassium chloride ER (K-TAB) 20 MEQ tablet controlled-release ER tablet Take 1 tablet by mouth Daily. 30 tablet 0 Taking    rosuvastatin (CRESTOR) 10 MG tablet Take 1 tablet by mouth Daily. 30 tablet 0 Taking    sennosides-docusate (PERICOLACE) 8.6-50 MG per tablet Take 1 tablet by mouth Every Night. Obtain OTC   Taking    tamsulosin (FLOMAX) 0.4 MG  capsule 24 hr capsule Take 1 capsule by mouth Daily. 90 capsule 4 Taking    Insulin Lispro (humaLOG) 100 UNIT/ML injection Inject 2-12 Units under the skin into the appropriate area as directed 4 (Four) Times a Day Before Meals & at Bedtime. Inject subcutaneously four times a day per sliding scale:  0-150 = 0 units; 151-200 = 2u; 201-250 = 4u; 251-300 = 6u; 301-350 = 8u; 351-400 = 10u; 401+ = 12u       nitroglycerin (NITROSTAT) 0.4 MG SL tablet Place 1 tablet under the tongue Every 5 (Five) Minutes As Needed for Chest Pain. Take no more than 3 doses in 15 minutes.       pregabalin (LYRICA) 100 MG capsule Take 1 capsule by mouth Daily.       pregabalin (LYRICA) 100 MG capsule Take 2 capsules by mouth Every Night.       sodium hypochlorite (DAKIN'S 1/4 STRENGTH) 0.125 % solution topical solution 0.125% Apply  topically to the appropriate area as directed 2 (Two) Times a Day. 473 mL 5        Medicines:  Current Facility-Administered Medications   Medication Dose Route Frequency Provider Last Rate Last Admin    acetaminophen (TYLENOL) tablet 650 mg  650 mg Oral Q4H PRN Garrett Alicia MD        Or    acetaminophen (TYLENOL) 160 MG/5ML oral solution 650 mg  650 mg Oral Q4H PRN Garrett Alicia MD        Or    acetaminophen (TYLENOL) suppository 650 mg  650 mg Rectal Q4H PRN Garrett Alicia MD        apixaban (ELIQUIS) tablet 5 mg  5 mg Oral BID Garrett Alicia MD   5 mg at 11/23/24 0904    [Held by provider] ascorbic acid (VITAMIN C) tablet 500 mg  500 mg Oral BID Telly Rodriguez MD   500 mg at 11/20/24 0837    sennosides-docusate (PERICOLACE) 8.6-50 MG per tablet 2 tablet  2 tablet Oral BID PRN Garrett Alicia MD        And    polyethylene glycol (MIRALAX) packet 17 g  17 g Oral Daily PRN Garrett Alicia MD        And    bisacodyl (DULCOLAX) EC tablet 5 mg  5 mg Oral Daily PRN Garrett Alicia MD   5 mg at 11/22/24 0924    And    bisacodyl (DULCOLAX)  suppository 10 mg  10 mg Rectal Daily PRN Garrett Alicia MD        [Held by provider] bumetanide (BUMEX) tablet 1 mg  1 mg Oral BID Telly Rodriguez MD   1 mg at 11/21/24 0914    busPIRone (BUSPAR) tablet 10 mg  10 mg Oral TID PRN Garrett Alicia MD   10 mg at 11/20/24 2055    calcium carbonate (TUMS) chewable tablet 500 mg (200 mg elemental)  2 tablet Oral TID PRN Telyl Rodriguez MD   2 tablet at 11/20/24 1459    Calcium Replacement - Follow Nurse / BPA Driven Protocol   Not Applicable PRN Telly Rodriguez MD        dextrose (D50W) (25 g/50 mL) IV injection 25 g  25 g Intravenous Q15 Min PRN Garrett Alicia MD        dextrose (GLUTOSE) oral gel 15 g  15 g Oral Q15 Min PRN Garrett Alicia MD   15 g at 11/19/24 0040    docusate sodium (COLACE) capsule 100 mg  100 mg Oral BID Telly Rodriguez MD   100 mg at 11/23/24 0904    [Held by provider] donepezil (ARICEPT) tablet 10 mg  10 mg Oral Nightly Garrett Alicia MD   10 mg at 11/18/24 0219    DULoxetine (CYMBALTA) DR capsule 30 mg  30 mg Oral Daily Telly Rodriguez MD   30 mg at 11/23/24 0904    ertapenem (INVanz) 1,000 mg in sodium chloride 0.9 % 100 mL MBP  1,000 mg Intravenous Q24H Telly Rodriguez  mL/hr at 11/23/24 1529 1,000 mg at 11/23/24 1529    famotidine (PEPCID) injection 20 mg  20 mg Intravenous Daily Telly Rodriguez MD   20 mg at 11/23/24 0904    glucagon (GLUCAGEN) injection 1 mg  1 mg Intramuscular Q15 Min PRN Garrett Alicia MD        insulin glargine (LANTUS, SEMGLEE) injection 20 Units  20 Units Subcutaneous Nightly Garrett Alicia MD   20 Units at 11/18/24 2143    Insulin Lispro (humaLOG) injection 10 Units  10 Units Subcutaneous TID With Meals Garrett Alicia MD   10 Units at 11/22/24 1727    Insulin Lispro (humaLOG) injection 2-9 Units  2-9 Units Subcutaneous 4x Daily AC & at Bedtime Garrett Alicia MD   4 Units at 11/22/24 2041    lisinopril (PRINIVIL,ZESTRIL) tablet  5 mg  5 mg Oral Daily Garrett Alicia MD   5 mg at 11/23/24 0904    Magnesium Standard Dose Replacement - Follow Nurse / BPA Driven Protocol   Not Applicable PRTelly Arriaga MD        multivitamin with minerals 1 tablet  1 tablet Oral Daily Telly Rodriguez MD   1 tablet at 11/23/24 0904    nitroglycerin (NITROSTAT) SL tablet 0.4 mg  0.4 mg Sublingual Q5 Min PRN Garrett Alicia MD        ondansetron (ZOFRAN) injection 4 mg  4 mg Intravenous Q6H PRN Garrett Alicia MD   4 mg at 11/23/24 1118    oxyCODONE-acetaminophen (PERCOCET) 5-325 MG per tablet 1 tablet  1 tablet Oral Q4H PRN Telly Rodriguez MD   1 tablet at 11/23/24 1617    Phosphorus Replacement - Follow Nurse / BPA Driven Protocol   Not Applicable PRN Telly Rodriguez MD        Potassium Replacement - Follow Nurse / BPA Driven Protocol   Not Applicable PRN Telly Rodriguez MD        pregabalin (LYRICA) capsule 75 mg  75 mg Oral Nightly Telly Rodriguez MD   75 mg at 11/22/24 2040    prochlorperazine (COMPAZINE) injection 5 mg  5 mg Intravenous Q6H PRN Telly Rodriguez MD   5 mg at 11/20/24 1456    rosuvastatin (CRESTOR) tablet 10 mg  10 mg Oral Daily Garrett Alicia MD   10 mg at 11/23/24 0904    sennosides-docusate (PERICOLACE) 8.6-50 MG per tablet 1 tablet  1 tablet Oral Nightly Garrett Alicia MD   1 tablet at 11/22/24 2040    sodium chloride 0.9 % flush 10 mL  10 mL Intravenous Q12H Garrett Alicia MD   10 mL at 11/23/24 0904    sodium chloride 0.9 % flush 10 mL  10 mL Intravenous PRN Garrett Alicia MD        sodium chloride 0.9 % infusion 40 mL  40 mL Intravenous PRN Garrett Alicia MD        tamsulosin (FLOMAX) 24 hr capsule 0.4 mg  0.4 mg Oral Daily Garrett Alicia MD   0.4 mg at 11/23/24 0904    [Held by provider] zinc sulfate (ZINCATE) capsule 220 mg  220 mg Oral Daily Telly Rodriguez MD   220 mg at 11/20/24 0836       Past Medical History:  Past Medical History:    Diagnosis Date    Arthritis     Autonomic disease     CAD (coronary artery disease) 02/06/2017    Cervical radiculopathy 09/16/2021    Chronic constipation with acute exaccerbation 05/10/2021    Coronary artery disease     Degeneration of cervical intervertebral disc 08/11/2021    Diabetes mellitus     Diabetic foot ulcer 08/31/2020    Diabetic polyneuropathy associated with type 2 diabetes mellitus 01/18/2021    Elevated cholesterol     Gastroesophageal reflux disease 05/13/2019    Headache     HTN (hypertension), benign 05/03/2017    Hyperlipidemia     Hypertension     Mixed hyperlipidemia 02/07/2017    Multiple lung nodules 01/26/2020    5mm, 9 mm RLL identified 1/2020, not present 10/2019.    Myocardial infarction     Osteomyelitis 01/22/2020    Osteomyelitis of fifth toe of right foot 10/07/2019    Pancreatitis     Persistent insomnia 01/20/2020    Renal disorder     Sleep apnea 02/06/2017    Sleep apnea with use of continuous positive airway pressure (CPAP)     NON-COMPLIANT    Slow transit constipation 01/16/2019    Spinal stenosis in cervical region 09/16/2021    Vitamin D deficiency 03/02/2021       Past Surgical History:  Past Surgical History:   Procedure Laterality Date    ABDOMINAL SURGERY      AMPUTATION FOOT / TOE Left 10/2021    5th digit     ANTERIOR CERVICAL DISCECTOMY W/ FUSION N/A 08/05/2022    Procedure: CERVICAL DISCECTOMY ANTERIOR WITH FUSION C5-6 with possible plating of C5-7 with neuromonitoring and 1 c-arm;  Surgeon: Karel Soliz MD;  Location:  [facility] OR;  Service: Neurosurgery;  Laterality: N/A;    APPENDECTOMY      BACK SURGERY      CARDIAC CATHETERIZATION Left 02/08/2021    Procedure: Left Heart Cath w poss intervention left anatomical snuff box acess;  Surgeon: Omkar Charles DO;  Location:  [facility] CATH INVASIVE LOCATION;  Service: Cardiology;  Laterality: Left;    CARDIAC SURGERY      CATARACT EXTRACTION      CERVICAL SPINE SURGERY      COLONOSCOPY N/A 01/31/2017     Normal exam repeat in 5 years    COLONOSCOPY N/A 02/11/2019    Mild acute inflammation    COLONOSCOPY N/A 04/07/2024    2 areas at 10 and 20 cm with friability ulceration 2 clips placed at 20 cm and 4 clips at 10 cm poor prep normal mucosa,mild eroisions and ulcerations in visible vessels    COLONOSCOPY N/A 7/1/2024    Procedure: COLONOSCOPY WITH ANESTHESIA;  Surgeon: Arsalan Lorenzo DO;  Location: [facility] ENDOSCOPY;  Service: Gastroenterology;  Laterality: N/A;  pre op constipation/diarrhea  post poor prep  pcp Del Shetty    COLONOSCOPY N/A 7/2/2024    Procedure: COLONOSCOPY WITH ANESTHESIA;  Surgeon: Agapito Christopher MD;  Location: [facility] ENDOSCOPY;  Service: Gastroenterology;  Laterality: N/A;  pre rectal bleeding  post poor prep  pcp Del Shetty MD    COLONOSCOPY W/ POLYPECTOMY  03/04/2014    Hyperplastic polyp    CORONARY ARTERY BYPASS GRAFT  10/2015    ENDOSCOPY  04/13/2011    Gastritis with hemorrhage    ENDOSCOPY N/A 05/05/2017    Normal exam    ENDOSCOPY N/A 02/11/2019    Gastritis    ENDOSCOPY N/A 09/01/2020    Non-erosive gastritis with hemorrhage    ENDOSCOPY N/A 02/10/2021    Esophagitis    ENDOSCOPY N/A 04/11/2024    There were esophageal mucosal changes suspicious for short-segment Low's esophagus present in the distal esophagus. The maximum longitudinal extent of these mucosal changes was 2 cm in length. Mucosa was biopsied with a cold forceps for histologyDistal esophagus, biopsies: Mild chronic active esophagogastritis. No evidence of intestinal metaplasia, dysplasia. Antrum, bx, Mild chronic gastritis    FOOT SURGERY Left     INCISION AND DRAINAGE OF WOUND Left 09/2007    spider bite       Family History  Family History   Problem Relation Age of Onset    Colon cancer Father     Heart disease Father     Colon cancer Sister     Colon polyps Sister     Alzheimer's disease Mother     Coronary artery disease Sister     Coronary artery disease Sister        Social History  Social History      Socioeconomic History    Marital status:    Tobacco Use    Smoking status: Former     Current packs/day: 0.00     Types: Cigarettes     Quit date:      Years since quittin.9    Smokeless tobacco: Never    Tobacco comments:     smoked in highschool   Vaping Use    Vaping status: Never Used   Substance and Sexual Activity    Alcohol use: No    Drug use: No    Sexual activity: Defer       Review of Systems:  History obtained from chart review and the patient  General ROS: No fever or chills  Respiratory ROS: No cough, shortness of breath, wheezing  Cardiovascular ROS: No chest pain or palpitations  Gastrointestinal ROS: No abdominal pain or melena  Genito-Urinary ROS: No dysuria or hematuria  Psych ROS: No anxiety and depression  14 point ROS reviewed with the patient and negative except as noted above and in the HPI unless unable to obtain.    Objective:  Patient Vitals for the past 24 hrs:   BP Temp Temp src Pulse Resp SpO2   24 1449 114/55 98.3 °F (36.8 °C) Oral 77 16 93 %   24 1107 122/57 98.7 °F (37.1 °C) Oral 111 16 95 %   24 0727 135/77 98.4 °F (36.9 °C) Oral 68 16 98 %   24 0346 113/65 97.7 °F (36.5 °C) Oral 61 16 98 %   24 0033 117/57 97.7 °F (36.5 °C) Oral 63 16 98 %   24 2118 116/57 97.6 °F (36.4 °C) Oral 66 16 99 %       Intake/Output Summary (Last 24 hours) at 2024 1653  Last data filed at 2024 1407  Gross per 24 hour   Intake 1450 ml   Output 1500 ml   Net -50 ml     General: awake/alert   Chest:  clear to auscultation bilaterally without respiratory distress  CVS: regular rate and rhythm  Abdominal: soft, nontender, positive bowel sounds  Extremities: ble edema  Skin: warm and dry without rash      Labs:  Results from last 7 days   Lab Units 24  0604 24  0336 24  0359   WBC 10*3/mm3 6.24 7.46 6.58   HEMOGLOBIN g/dL 10.8* 10.6* 10.7*   HEMATOCRIT % 34.0* 33.4* 33.0*   PLATELETS 10*3/mm3 256 279 262         Results from  last 7 days   Lab Units 11/23/24  0604 11/22/24  0336 11/21/24  0359 11/18/24  0416 11/17/24  2153   SODIUM mmol/L 140 138 140   < > 138   POTASSIUM mmol/L 4.4 4.9 4.6   < > 4.4   CHLORIDE mmol/L 107 104 105   < > 106   CO2 mmol/L 21.0* 23.0 24.0   < > 19.0*   BUN mg/dL 52* 48* 40*   < > 53*   CREATININE mg/dL 2.72* 3.00* 2.51*   < > 2.83*   CALCIUM mg/dL 8.3* 8.3* 8.8   < > 8.6   EGFR mL/min/1.73 24.7* 21.9* 27.2*   < > 23.5*   BILIRUBIN mg/dL  --   --   --   --  0.3   ALK PHOS U/L  --   --   --   --  134*   ALT (SGPT) U/L  --   --   --   --  19   AST (SGOT) U/L  --   --   --   --  13   GLUCOSE mg/dL 144* 214* 91   < > 348*    < > = values in this interval not displayed.       Radiology:   Imaging Results (Last 72 Hours)       ** No results found for the last 72 hours. **            Culture:  Blood Culture   Date Value Ref Range Status   11/18/2024 No growth at 24 hours  Preliminary   11/17/2024 No growth at 24 hours  Preliminary     Urine Culture   Date Value Ref Range Status   11/17/2024 >100,000 CFU/mL Gram Negative Bacilli (A)  Preliminary         Assessment   Acute kidney injury  Chronic kidney disease stage IIIb  Hypertension  Diabetes type 2-uncontrolled by A1c 13.4  Anemia chronic kidney disease  Obesity  Metabolic acidosis  Acute cystitis     Plan:  Discussed with patient, nursing  Workup reviewed today  Monitor labs  Antibiotics per primary service  Creatinine jumped with decreased IV fluids and increased BUN as well, will resume fluids overnight is likely not taking adequate oral intake, especially in the face of uncontrolled diabetes and reassess tomorrow with continued fluids given improvement        Willy Arteaga MD  11/23/2024  16:53 CST

## 2024-11-23 NOTE — PLAN OF CARE
Goal Outcome Evaluation:  Plan of Care Reviewed With: patient        Progress: no change  Outcome Evaluation: Pt A&O X4. C/o back,abdomen pain; see mar. Accu checks ACHS. Cam boot to L. foot when walking. Voiding per urinal. IVF started this shift, abx infusing intermittently. Isolation precautions maintained. Eliquis for VTE. VSS safety maintained.

## 2024-11-24 LAB
ANION GAP SERPL CALCULATED.3IONS-SCNC: 12 MMOL/L (ref 5–15)
BUN SERPL-MCNC: 45 MG/DL (ref 8–23)
BUN/CREAT SERPL: 18.9 (ref 7–25)
CALCIUM SPEC-SCNC: 8.1 MG/DL (ref 8.6–10.5)
CHLORIDE SERPL-SCNC: 106 MMOL/L (ref 98–107)
CO2 SERPL-SCNC: 21 MMOL/L (ref 22–29)
CREAT SERPL-MCNC: 2.38 MG/DL (ref 0.76–1.27)
DEPRECATED RDW RBC AUTO: 50.2 FL (ref 37–54)
EGFRCR SERPLBLD CKD-EPI 2021: 29 ML/MIN/1.73
ERYTHROCYTE [DISTWIDTH] IN BLOOD BY AUTOMATED COUNT: 15.9 % (ref 12.3–15.4)
GLUCOSE BLDC GLUCOMTR-MCNC: 119 MG/DL (ref 70–130)
GLUCOSE BLDC GLUCOMTR-MCNC: 147 MG/DL (ref 70–130)
GLUCOSE BLDC GLUCOMTR-MCNC: 178 MG/DL (ref 70–130)
GLUCOSE BLDC GLUCOMTR-MCNC: 221 MG/DL (ref 70–130)
GLUCOSE SERPL-MCNC: 200 MG/DL (ref 65–99)
HCT VFR BLD AUTO: 29.1 % (ref 37.5–51)
HGB BLD-MCNC: 9.7 G/DL (ref 13–17.7)
MCH RBC QN AUTO: 28.7 PG (ref 26.6–33)
MCHC RBC AUTO-ENTMCNC: 33.3 G/DL (ref 31.5–35.7)
MCV RBC AUTO: 86.1 FL (ref 79–97)
PLATELET # BLD AUTO: 239 10*3/MM3 (ref 140–450)
PMV BLD AUTO: 11.3 FL (ref 6–12)
POTASSIUM SERPL-SCNC: 4.1 MMOL/L (ref 3.5–5.2)
RBC # BLD AUTO: 3.38 10*6/MM3 (ref 4.14–5.8)
SODIUM SERPL-SCNC: 139 MMOL/L (ref 136–145)
WBC NRBC COR # BLD AUTO: 5.24 10*3/MM3 (ref 3.4–10.8)

## 2024-11-24 PROCEDURE — 85027 COMPLETE CBC AUTOMATED: CPT | Performed by: FAMILY MEDICINE

## 2024-11-24 PROCEDURE — 97116 GAIT TRAINING THERAPY: CPT

## 2024-11-24 PROCEDURE — 63710000001 INSULIN LISPRO (HUMAN) PER 5 UNITS: Performed by: FAMILY MEDICINE

## 2024-11-24 PROCEDURE — 82948 REAGENT STRIP/BLOOD GLUCOSE: CPT

## 2024-11-24 PROCEDURE — 25010000002 ERTAPENEM PER 500 MG: Performed by: FAMILY MEDICINE

## 2024-11-24 PROCEDURE — 80048 BASIC METABOLIC PNL TOTAL CA: CPT | Performed by: FAMILY MEDICINE

## 2024-11-24 RX ADMIN — APIXABAN 5 MG: 5 TABLET, FILM COATED ORAL at 20:17

## 2024-11-24 RX ADMIN — Medication 1 TABLET: at 08:15

## 2024-11-24 RX ADMIN — APIXABAN 5 MG: 5 TABLET, FILM COATED ORAL at 08:15

## 2024-11-24 RX ADMIN — PREGABALIN 75 MG: 75 CAPSULE ORAL at 20:17

## 2024-11-24 RX ADMIN — DULOXETINE HYDROCHLORIDE 30 MG: 30 CAPSULE, DELAYED RELEASE ORAL at 08:16

## 2024-11-24 RX ADMIN — Medication 10 ML: at 20:17

## 2024-11-24 RX ADMIN — Medication 10 ML: at 08:16

## 2024-11-24 RX ADMIN — DOCUSATE SODIUM 50 MG AND SENNOSIDES 8.6 MG 1 TABLET: 8.6; 5 TABLET, FILM COATED ORAL at 20:16

## 2024-11-24 RX ADMIN — DOCUSATE SODIUM 100 MG: 100 CAPSULE, LIQUID FILLED ORAL at 08:15

## 2024-11-24 RX ADMIN — INSULIN LISPRO 10 UNITS: 100 INJECTION, SOLUTION INTRAVENOUS; SUBCUTANEOUS at 17:05

## 2024-11-24 RX ADMIN — TAMSULOSIN HYDROCHLORIDE 0.4 MG: 0.4 CAPSULE ORAL at 08:15

## 2024-11-24 RX ADMIN — OXYCODONE HYDROCHLORIDE AND ACETAMINOPHEN 1 TABLET: 5; 325 TABLET ORAL at 08:15

## 2024-11-24 RX ADMIN — INSULIN LISPRO 2 UNITS: 100 INJECTION, SOLUTION INTRAVENOUS; SUBCUTANEOUS at 08:15

## 2024-11-24 RX ADMIN — OXYCODONE HYDROCHLORIDE AND ACETAMINOPHEN 1 TABLET: 5; 325 TABLET ORAL at 14:59

## 2024-11-24 RX ADMIN — ROSUVASTATIN 10 MG: 10 TABLET, FILM COATED ORAL at 08:15

## 2024-11-24 RX ADMIN — OXYCODONE HYDROCHLORIDE AND ACETAMINOPHEN 1 TABLET: 5; 325 TABLET ORAL at 20:17

## 2024-11-24 RX ADMIN — DOCUSATE SODIUM 100 MG: 100 CAPSULE, LIQUID FILLED ORAL at 20:17

## 2024-11-24 RX ADMIN — OXYCODONE HYDROCHLORIDE AND ACETAMINOPHEN 1 TABLET: 5; 325 TABLET ORAL at 01:02

## 2024-11-24 RX ADMIN — FAMOTIDINE 20 MG: 10 INJECTION INTRAVENOUS at 08:15

## 2024-11-24 RX ADMIN — INSULIN LISPRO 4 UNITS: 100 INJECTION, SOLUTION INTRAVENOUS; SUBCUTANEOUS at 17:05

## 2024-11-24 RX ADMIN — LISINOPRIL 5 MG: 10 TABLET ORAL at 08:15

## 2024-11-24 RX ADMIN — ERTAPENEM 1000 MG: 1 INJECTION INTRAMUSCULAR; INTRAVENOUS at 14:57

## 2024-11-24 NOTE — THERAPY TREATMENT NOTE
Acute Care - Physical Therapy Treatment Note  [facility]     Patient Name: Erick Luong  : 1955  MRN: [id number]  Today's Date: 2024      Visit Dx:     ICD-10-CM ICD-9-CM   1. Acute UTI (urinary tract infection)  N39.0 599.0   2. Acute renal failure superimposed on chronic kidney disease, unspecified acute renal failure type, unspecified CKD stage  N17.9 584.9    N18.9 585.9   3. Acute hyperglycemia  R73.9 790.29   4. Impaired functional mobility and activity tolerance [Z74.09]  Z74.09 V49.89     Patient Active Problem List   Diagnosis    Obesity, unspecified obesity severity, unspecified obesity type    Essential hypertension    Type 2 diabetes mellitus with hyperglycemia, with long-term current use of insulin    Nonsmoker    Anemia due to chronic kidney disease    Class 3 severe obesity due to excess calories with body mass index (BMI) of 40.0 to 44.9 in adult    Anasarca    Sleep apnea with use of continuous positive airway pressure (CPAP)    Medically noncompliant    Diabetic ulcer of left foot associated with type 2 diabetes mellitus    Diabetic polyneuropathy associated with type 2 diabetes mellitus    Spinal stenosis in cervical region    Degeneration of cervical intervertebral disc    Cervical radiculopathy    Degeneration of lumbar or lumbosacral intervertebral disc    Cervical myelopathy    Bilateral carpal tunnel syndrome    CAD (coronary artery disease)    GERD without esophagitis    BPH without obstruction/lower urinary tract symptoms    Stage 3b chronic kidney disease    Chronic diastolic heart failure    Type 2 myocardial infarction due to heart failure    Left carpal tunnel syndrome    Syncope and collapse, recurrent episodes    Poorly-controlled hypertension    Rhinovirus    Peripheral vascular disease    Chronic kidney disease (CKD), stage IV (severe)    Diabetic foot infection    Sepsis    Epigastric pain    Chronic heart failure with preserved ejection fraction (HFpEF)    Sepsis  due to methicillin resistant Staphylococcus aureus (MRSA) with encephalopathy without septic shock    Slow transit constipation    CHF exacerbation    CHF (congestive heart failure), NYHA class I, acute on chronic, combined    Rectal bleeding    Acute on chronic heart failure with preserved ejection fraction (HFpEF)    DKA, type 2, not at goal    Atrial fibrillation    E. coli UTI, ESBL    Acute UTI (urinary tract infection)    Acute encephalopathy    Type 2 diabetes mellitus with hyperglycemia, with long-term current use of insulin     Past Medical History:   Diagnosis Date    Arthritis     Autonomic disease     CAD (coronary artery disease) 02/06/2017    Cervical radiculopathy 09/16/2021    Chronic constipation with acute exaccerbation 05/10/2021    Coronary artery disease     Degeneration of cervical intervertebral disc 08/11/2021    Diabetes mellitus     Diabetic foot ulcer 08/31/2020    Diabetic polyneuropathy associated with type 2 diabetes mellitus 01/18/2021    Elevated cholesterol     Gastroesophageal reflux disease 05/13/2019    Headache     HTN (hypertension), benign 05/03/2017    Hyperlipidemia     Hypertension     Mixed hyperlipidemia 02/07/2017    Multiple lung nodules 01/26/2020    5mm, 9 mm RLL identified 1/2020, not present 10/2019.    Myocardial infarction     Osteomyelitis 01/22/2020    Osteomyelitis of fifth toe of right foot 10/07/2019    Pancreatitis     Persistent insomnia 01/20/2020    Renal disorder     Sleep apnea 02/06/2017    Sleep apnea with use of continuous positive airway pressure (CPAP)     NON-COMPLIANT    Slow transit constipation 01/16/2019    Spinal stenosis in cervical region 09/16/2021    Vitamin D deficiency 03/02/2021     Past Surgical History:   Procedure Laterality Date    ABDOMINAL SURGERY      AMPUTATION FOOT / TOE Left 10/2021    5th digit     ANTERIOR CERVICAL DISCECTOMY W/ FUSION N/A 08/05/2022    Procedure: CERVICAL DISCECTOMY ANTERIOR WITH FUSION C5-6 with possible  plating of C5-7 with neuromonitoring and 1 c-arm;  Surgeon: Karel Soliz MD;  Location: [facility] OR;  Service: Neurosurgery;  Laterality: N/A;    APPENDECTOMY      BACK SURGERY      CARDIAC CATHETERIZATION Left 02/08/2021    Procedure: Left Heart Cath w poss intervention left anatomical snuff box acess;  Surgeon: Omkar Charles DO;  Location: [facility] CATH INVASIVE LOCATION;  Service: Cardiology;  Laterality: Left;    CARDIAC SURGERY      CATARACT EXTRACTION      CERVICAL SPINE SURGERY      COLONOSCOPY N/A 01/31/2017    Normal exam repeat in 5 years    COLONOSCOPY N/A 02/11/2019    Mild acute inflammation    COLONOSCOPY N/A 04/07/2024    2 areas at 10 and 20 cm with friability ulceration 2 clips placed at 20 cm and 4 clips at 10 cm poor prep normal mucosa,mild eroisions and ulcerations in visible vessels    COLONOSCOPY N/A 7/1/2024    Procedure: COLONOSCOPY WITH ANESTHESIA;  Surgeon: Arsalan Lorenzo DO;  Location: [facility] ENDOSCOPY;  Service: Gastroenterology;  Laterality: N/A;  pre op constipation/diarrhea  post poor prep  pcp Del Shetty    COLONOSCOPY N/A 7/2/2024    Procedure: COLONOSCOPY WITH ANESTHESIA;  Surgeon: Agapito Christopher MD;  Location: [facility] ENDOSCOPY;  Service: Gastroenterology;  Laterality: N/A;  pre rectal bleeding  post poor prep  pcp Del Shetty MD    COLONOSCOPY W/ POLYPECTOMY  03/04/2014    Hyperplastic polyp    CORONARY ARTERY BYPASS GRAFT  10/2015    ENDOSCOPY  04/13/2011    Gastritis with hemorrhage    ENDOSCOPY N/A 05/05/2017    Normal exam    ENDOSCOPY N/A 02/11/2019    Gastritis    ENDOSCOPY N/A 09/01/2020    Non-erosive gastritis with hemorrhage    ENDOSCOPY N/A 02/10/2021    Esophagitis    ENDOSCOPY N/A 04/11/2024    There were esophageal mucosal changes suspicious for short-segment Low's esophagus present in the distal esophagus. The maximum longitudinal extent of these mucosal changes was 2 cm in length. Mucosa was biopsied with a cold forceps for  histologyDistal esophagus, biopsies: Mild chronic active esophagogastritis. No evidence of intestinal metaplasia, dysplasia. Antrum, bx, Mild chronic gastritis    FOOT SURGERY Left     INCISION AND DRAINAGE OF WOUND Left 09/2007    spider bite     PT Assessment (Last 12 Hours)       PT Evaluation and Treatment       Row Name 11/24/24 [facility]          Physical Therapy Time and Intention    Subjective Information complains of;pain  -TB     Document Type therapy note (daily note)  -TB     Mode of Treatment physical therapy  -TB       Row Name 11/24/24 1437          General Information    Existing Precautions/Restrictions fall  Cam boot LLE  -TB       Row Name 11/24/24 1437          Pain    Pretreatment Pain Rating 6/10  -TB     Posttreatment Pain Rating 6/10  -TB     Pain Location back  -TB       Row Name 11/24/24 1437          Bed Mobility    Bed Mobility scooting/bridging;supine-sit  -TB     Scooting/Bridging [place] (Bed Mobility) modified independence  -TB     Supine-Sit [place] (Bed Mobility) modified independence  -TB     Assistive Device (Bed Mobility) head of bed elevated  -TB       Row Name 11/24/24 1437          Transfers    Transfers sit-stand transfer;stand-sit transfer  -TB       Row Name 11/24/24 1437          Sit-Stand Transfer    Sit-Stand [place] (Transfers) modified independence  -TB     Assistive Device (Sit-Stand Transfers) walker, front-wheeled  -TB       Row Name 11/24/24 1437          Stand-Sit Transfer    Stand-Sit [place] (Transfers) modified independence  -TB     Assistive Device (Stand-Sit Transfers) walker, front-wheeled  -TB       Row Name 11/24/24 1437          Gait/Stairs (Locomotion)    [place] Level (Gait) supervision  -TB     Assistive Device (Gait) walker, front-wheeled  -TB     Distance in Feet (Gait) 50  x4  -TB       Row Name 11/24/24 1437          Balance    Balance Assessment sitting static balance;sitting dynamic balance  -TB     Static Sitting Balance  independent  -TB     Dynamic Sitting Balance independent  -TB     Position, Sitting Balance sitting edge of bed  -TB       Row Name             Wound 08/22/23 0140 Left posterior plantar    Wound - Properties Group Placement Date: 08/22/23  -AM Placement Time: 0140  -AM Side: Left  -AM Orientation: posterior  -AM Location: plantar  -AM Present on Original Admission: Y  -AM    Retired Wound - Properties Group Placement Date: 08/22/23  -AM Placement Time: 0140  -AM Present on Original Admission: Y  -AM Side: Left  -AM Orientation: posterior  -AM Location: plantar  -AM    Retired Wound - Properties Group Placement Date: 08/22/23  -AM Placement Time: 0140  -AM Present on Original Admission: Y  -AM Side: Left  -AM Orientation: posterior  -AM Location: plantar  -AM    Retired Wound - Properties Group Date first assessed: 08/22/23  -AM Time first assessed: 0140  -AM Present on Original Admission: Y  -AM Side: Left  -AM Location: plantar  -AM      Row Name             Wound 06/15/23 0102 Left anterior plantar    Wound - Properties Group Placement Date: 06/15/23  -SM Placement Time: 0102  -SM Side: Left  -SM Orientation: anterior  -SM Location: plantar  -SM Removal Date: --  -JR Removal Time: --  -JR    Retired Wound - Properties Group Placement Date: 06/15/23  -SM Placement Time: 0102  -SM Side: Left  -SM Orientation: anterior  -SM Location: plantar  -SM Removal Date: --  -JR Removal Time: --  -JR    Retired Wound - Properties Group Placement Date: 06/15/23  -SM Placement Time: 0102  -SM Side: Left  -SM Orientation: anterior  -SM Location: plantar  -SM Removal Date: --  -JR Removal Time: --  -JR    Retired Wound - Properties Group Date first assessed: 06/15/23  -SM Time first assessed: 0102  -SM Side: Left  -SM Location: plantar  -SM Resolution Date: --  -JR Resolution Time: --  -JR      Row Name             Wound 11/18/24 0046 Left lower arm skin tear;abrasion    Wound - Properties Group Placement Date: 11/18/24  -JR  Placement Time: 0046 -JR Side: Left  -JR Orientation: lower  -JR Location: arm  -JR Primary Wound Type: Abrasion  -JR Type: skin tear;abrasion  -JR Present on Original Admission: Y  -JR    Retired Wound - Properties Group Placement Date: 11/18/24  -JR Placement Time: 0046 -JR Present on Original Admission: Y  -JR Side: Left  -JR Orientation: lower  -JR Location: arm  -JR Primary Wound Type: Abrasion  -JR Type: skin tear;abrasion  -JR    Retired Wound - Properties Group Placement Date: 11/18/24  -JR Placement Time: 0046 -JR Present on Original Admission: Y  -JR Side: Left  -JR Orientation: lower  -JR Location: arm  -JR Primary Wound Type: Abrasion  -JR Type: skin tear;abrasion  -JR    Retired Wound - Properties Group Date first assessed: 11/18/24  -JR Time first assessed: 0046 -JR Present on Original Admission: Y  -JR Side: Left  -JR Location: arm  -JR Primary Wound Type: Abrasion  -JR Type: skin tear;abrasion  -JR      Row Name             Wound 11/18/24 0056 Left medial foot diabetic ulcer    Wound - Properties Group Placement Date: 11/18/24  -JR Placement Time: 0056 -JR Side: Left  -JR Orientation: medial  -JR Location: foot  -JR Primary Wound Type: Diabetic ulc  -JR Type: diabetic ulcer  -JR Present on Original Admission: Y  -JR    Retired Wound - Properties Group Placement Date: 11/18/24  -JR Placement Time: 0056 -JR Present on Original Admission: Y  -JR Side: Left  -JR Orientation: medial  -JR Location: foot  -JR Primary Wound Type: Diabetic ulc  -JR Type: diabetic ulcer  -JR    Retired Wound - Properties Group Placement Date: 11/18/24  -JR Placement Time: 0056 -JR Present on Original Admission: Y  -JR Side: Left  -JR Orientation: medial  -JR Location: foot  -JR Primary Wound Type: Diabetic ulc  -JR Type: diabetic ulcer  -JR    Retired Wound - Properties Group Date first assessed: 11/18/24  -JR Time first assessed: 0056 -JR Present on Original Admission: Y  -JR Side: Left  -JR Location: foot  -JR  Primary Wound Type: Diabetic ulc  -JR Type: diabetic ulcer  -JR      Row Name 11/24/24 1437          Plan of Care Review    Plan of Care Reviewed With patient  -TB     Progress improving  -TB     Outcome Evaluation PT tx complete. Pt c/o back pain 6/10. Bed mob Mod Ind to EOB with HOB elevated. Sitting balance Ind. Max A to dong Cam boot to LLE. Pt stood Mod Ind with RW. Amb 50' x4 with RW and supervision. Rest breaks in-between. Will cont POC.  -TB       Row Name 11/24/24 1437          Positioning and Restraints    Pre-Treatment Position in bed  -TB     Post Treatment Position bed  -TB     In Bed sitting EOB;call light within reach;encouraged to call for assist;notified nsg  -TB               User Key  (r) = Recorded By, (t) = Taken By, (c) = Cosigned By      Initials Name Provider Type    TB Wendy Richards R, PTA Physical Therapist Assistant    Faviola Kunz RN Registered Nurse    Michelle Diaz RN Registered Nurse    Sabas Young LPNA Licensed Nurse    Sabas Young RN Registered Nurse                    Physical Therapy Education       Title: PT OT SLP Therapies (In Progress)       Topic: Physical Therapy (Not Started)       Point: Mobility training (In Progress)       Learning Progress Summary            Patient Acceptance, E, NR by AE at 11/21/2024 0850    Comment: NWB L LE and need for CAM boot    Acceptance, E, VU by JACIEL at 11/19/2024 0958    Comment: limited gait until boot for RLE    Acceptance, E,TB,D, VU,NR by JE at 11/18/2024 1128    Comment: education re: purpose of PT/importance of activity, safety/falls prevention, NWB L LE due to open wounds, improved tech w/ tfers, positioning w/ L LE elevated                      Point: Home exercise program (In Progress)       Learning Progress Summary            Patient Acceptance, E, NR by AE at 11/21/2024 0850    Comment: NWB L LE and need for CAM boot                      Point: Precautions (Done)       Learning Progress  Summary            Patient Acceptance, E,TB,D, VU,NR by  at 11/18/2024 1128    Comment: education re: purpose of PT/importance of activity, safety/falls prevention, NWB L LE due to open wounds, improved tech w/ tfers, positioning w/ L LE elevated                                      User Key       Initials Effective Dates Name Provider Type Discipline    AE 02/03/23 -  Hansa Arambula, PTA Physical Therapist Assistant PT    JACIEL 02/03/23 -  Sabas Maharaj, PTA Physical Therapist Assistant PT    NIKO 08/02/18 -  Melly Mustafa, PT Physical Therapist PT                  PT Recommendation and Plan     Plan of Care Reviewed With: patient  Progress: improving  Outcome Evaluation: PT tx complete. Pt c/o back pain 6/10. Bed mob Mod Ind to EOB with HOB elevated. Sitting balance Ind. Max A to dong Cam boot to LLE. Pt stood Mod Ind with RW. Amb 50' x4 with RW and supervision. Rest breaks in-between. Will cont POC.   Outcome Measures       Row Name 11/24/24 1437             How much help from another person do you currently need...    Turning from your back to your side while in flat bed without using bedrails? 4  -TB      Moving from lying on back to sitting on the side of a flat bed without bedrails? 4  -TB      Moving to and from a bed to a chair (including a wheelchair)? 4  -TB      Standing up from a chair using your arms (e.g., wheelchair, bedside chair)? 4  -TB      Climbing 3-5 steps with a railing? 3  -TB      To walk in hospital room? 4  -TB      AM-PAC 6 Clicks Score (PT) 23  -TB         Functional Assessment    Outcome Measure Options AM-PAC 6 Clicks Basic Mobility (PT)  -TB                User Key  (r) = Recorded By, (t) = Taken By, (c) = Cosigned By      Initials Name Provider Type    TB Wendy Richards, PTA Physical Therapist Assistant                     Time Calculation:    PT Charges       Row Name 11/24/24 1505             Time Calculation    Start Time 1437  -TB      Stop Time 1500  -TB      Time  Calculation (min) 23 min  -TB      PT Received On 11/24/24  -TB         Time Calculation- PT    Total Timed Code Minutes- PT 23 minute(s)  -TB                User Key  (r) = Recorded By, (t) = Taken By, (c) = Cosigned By      Initials Name Provider Type    TB Wendy Richards PTA Physical Therapist Assistant                  Therapy Charges for Today       Code Description Service Date Service Provider Modifiers Qty    [id number] HC GAIT TRAINING EA 15 MIN 11/24/2024 Wendy Richards PTA GP 2            PT G-Codes  Outcome Measure Options: AM-PAC 6 Clicks Basic Mobility (PT)  AM-PAC 6 Clicks Score (PT): 23  AM-PAC 6 Clicks Score (OT): 22    Wendy Richards PTA  11/24/2024

## 2024-11-24 NOTE — PROGRESS NOTES
Nephrology ([facility]) Progress Note      Patient:  Erick Luong  YOB: 1955  Date of Service: 11/24/2024  MRN: [id number]   Acct: [id number]   Primary Care Physician: Del Shetty MD  Advance Directive:   Code Status and Medical Interventions: CPR (Attempt to Resuscitate); Full Support   Ordered at: 11/18/24 0159     Code Status (Patient has no pulse and is not breathing):    CPR (Attempt to Resuscitate)     Medical Interventions (Patient has pulse or is breathing):    Full Support     Admit Date: 11/17/2024       Hospital Day: 5  Referring Provider: Garrett Alicia,*      Patient personally seen and examined.  Complete chart including Consults, Notes, Operative Reports, Labs, Cardiology, and Radiology studies reviewed as able.        Subjective:  Erick Luong is a 69 y.o. male for whom we were consulted for evaluation and treatment of acute kidney injury with history of chronic kidney disease. Baseline chronic kidney disease stage 3b. Baseline creatinine approximately 1.5-1.8. Follows in our office.  History of poorly controlled type 2 diabetes, hypertension, coronary artery disease, diabetic foot ulcer, obesity.  Patient was admitted with complaints of cystitis and failure to thrive along with foot wound.  He had had no dietary intake on the day of consult per review of notes.  He denies specific complaints upon questioning.  No evident peripheral edema.     Today, no overnight events.  Family not present.  Patient denied new complaints upon questioning.  Still with limited reported oral intake.    Allergies:  Cefepime, Bactrim [sulfamethoxazole-trimethoprim], Vancomycin, Zolpidem, and Metronidazole    Home Meds:  Medications Prior to Admission   Medication Sig Dispense Refill Last Dose/Taking    apixaban (Eliquis) 5 MG tablet tablet Take 1 tablet by mouth 2 (Two) Times a Day. 60 tablet 0 Taking    ascorbic acid (VITAMIN C) 1000 MG tablet Take 1 tablet by  mouth Daily. 30 tablet 3 Taking    bumetanide (BUMEX) 2 MG tablet Take 1 tablet by mouth 2 (Two) Times a Day. 6 tablet 3 Taking    busPIRone (BUSPAR) 10 MG tablet Take 1 tablet by mouth 3 (Three) Times a Day As Needed. (Patient taking differently: Take 1 tablet by mouth 3 (Three) Times a Day As Needed (anxiety).) 270 tablet 4 Taking Differently    donepezil (ARICEPT) 10 MG tablet Take 1 tablet by mouth every night at bedtime. 30 tablet 0 Taking    DULoxetine (CYMBALTA) 60 MG capsule Take 1 capsule by mouth Daily. 30 capsule 0 Taking    famotidine (PEPCID) 20 MG tablet Take 1 tablet by mouth 2 (Two) Times a Day. 60 tablet 0 Taking    insulin glargine (Lantus) 100 UNIT/ML injection Inject 35 Units under the skin into the appropriate area as directed every night at bedtime. 10 mL 0 Taking    lisinopril (PRINIVIL,ZESTRIL) 5 MG tablet Take 1 tablet by mouth Daily. 30 tablet 0 Taking    melatonin 3 MG tablet Take 2 tablets by mouth At Night As Needed for Sleep. 30 tablet 0 Taking As Needed    midodrine (PROAMATINE) 2.5 MG tablet Take 1 tablet by mouth 2 (Two) Times a Day. 60 tablet 5 Taking    oxyCODONE (ROXICODONE) 10 MG tablet Take 1 tablet by mouth 2 (Two) Times a Day As Needed. (Patient taking differently: Take 1 tablet by mouth 2 (Two) Times a Day As Needed for Moderate Pain or Severe Pain. *must last 30 days*) 55 tablet 0 Taking Differently    pantoprazole (PROTONIX) 40 MG EC tablet Take 1 tablet by mouth 2 (Two) Times a Day. 60 tablet 0 Taking    polyethylene glycol (MIRALAX) 17 GM/SCOOP powder Take 17 g by mouth Daily As Needed (constipation).   Taking As Needed    potassium chloride ER (K-TAB) 20 MEQ tablet controlled-release ER tablet Take 1 tablet by mouth Daily. 30 tablet 0 Taking    rosuvastatin (CRESTOR) 10 MG tablet Take 1 tablet by mouth Daily. 30 tablet 0 Taking    sennosides-docusate (PERICOLACE) 8.6-50 MG per tablet Take 1 tablet by mouth Every Night. Obtain OTC   Taking    tamsulosin (FLOMAX) 0.4 MG  capsule 24 hr capsule Take 1 capsule by mouth Daily. 90 capsule 4 Taking    Insulin Lispro (humaLOG) 100 UNIT/ML injection Inject 2-12 Units under the skin into the appropriate area as directed 4 (Four) Times a Day Before Meals & at Bedtime. Inject subcutaneously four times a day per sliding scale:  0-150 = 0 units; 151-200 = 2u; 201-250 = 4u; 251-300 = 6u; 301-350 = 8u; 351-400 = 10u; 401+ = 12u       nitroglycerin (NITROSTAT) 0.4 MG SL tablet Place 1 tablet under the tongue Every 5 (Five) Minutes As Needed for Chest Pain. Take no more than 3 doses in 15 minutes.       pregabalin (LYRICA) 100 MG capsule Take 1 capsule by mouth Daily.       pregabalin (LYRICA) 100 MG capsule Take 2 capsules by mouth Every Night.       sodium hypochlorite (DAKIN'S 1/4 STRENGTH) 0.125 % solution topical solution 0.125% Apply  topically to the appropriate area as directed 2 (Two) Times a Day. 473 mL 5        Medicines:  Current Facility-Administered Medications   Medication Dose Route Frequency Provider Last Rate Last Admin    acetaminophen (TYLENOL) tablet 650 mg  650 mg Oral Q4H PRN Garrett Alicia MD        Or    acetaminophen (TYLENOL) 160 MG/5ML oral solution 650 mg  650 mg Oral Q4H PRN Garrett Alicia MD        Or    acetaminophen (TYLENOL) suppository 650 mg  650 mg Rectal Q4H PRN Garrett Alicia MD        apixaban (ELIQUIS) tablet 5 mg  5 mg Oral BID Garrett Alicia MD   5 mg at 11/24/24 0815    [Held by provider] ascorbic acid (VITAMIN C) tablet 500 mg  500 mg Oral BID Telly Rodriguez MD   500 mg at 11/20/24 0837    sennosides-docusate (PERICOLACE) 8.6-50 MG per tablet 2 tablet  2 tablet Oral BID PRN Grarett Alicia MD        And    polyethylene glycol (MIRALAX) packet 17 g  17 g Oral Daily PRN Garrett Alicia MD        And    bisacodyl (DULCOLAX) EC tablet 5 mg  5 mg Oral Daily PRN Garrett Alicia MD   5 mg at 11/22/24 0924    And    bisacodyl (DULCOLAX)  suppository 10 mg  10 mg Rectal Daily PRN Garrett Alicia MD        [Held by provider] bumetanide (BUMEX) tablet 1 mg  1 mg Oral BID Telly Rodriguez MD   1 mg at 11/21/24 0914    busPIRone (BUSPAR) tablet 10 mg  10 mg Oral TID PRN Garrett Alicia MD   10 mg at 11/20/24 2055    calcium carbonate (TUMS) chewable tablet 500 mg (200 mg elemental)  2 tablet Oral TID PRN Telly Rodriguez MD   2 tablet at 11/20/24 1459    Calcium Replacement - Follow Nurse / BPA Driven Protocol   Not Applicable PRN Telly Rodriguez MD        dextrose (D50W) (25 g/50 mL) IV injection 25 g  25 g Intravenous Q15 Min PRN Garrett Alicia MD        dextrose (GLUTOSE) oral gel 15 g  15 g Oral Q15 Min PRN Garrett Alicia MD   15 g at 11/19/24 0040    docusate sodium (COLACE) capsule 100 mg  100 mg Oral BID Telly Rodriguez MD   100 mg at 11/24/24 0815    [Held by provider] donepezil (ARICEPT) tablet 10 mg  10 mg Oral Nightly Garrett Alicia MD   10 mg at 11/18/24 0219    DULoxetine (CYMBALTA) DR capsule 30 mg  30 mg Oral Daily Telly Rodriguez MD   30 mg at 11/24/24 0816    famotidine (PEPCID) injection 20 mg  20 mg Intravenous Daily Telly Rodriguez MD   20 mg at 11/24/24 0815    glucagon (GLUCAGEN) injection 1 mg  1 mg Intramuscular Q15 Min PRN Garrett Alicia MD        insulin glargine (LANTUS, SEMGLEE) injection 20 Units  20 Units Subcutaneous Nightly Garrett Alicia MD   20 Units at 11/18/24 2143    Insulin Lispro (humaLOG) injection 10 Units  10 Units Subcutaneous TID With Meals Garrett Alicia MD   10 Units at 11/22/24 1727    Insulin Lispro (humaLOG) injection 2-9 Units  2-9 Units Subcutaneous 4x Daily AC & at Bedtime Garrett Alicia MD   2 Units at 11/24/24 0815    lisinopril (PRINIVIL,ZESTRIL) tablet 5 mg  5 mg Oral Daily Garrett Alicia MD   5 mg at 11/24/24 0815    Magnesium Standard Dose Replacement - Follow Nurse / BPA Driven Protocol   Not  Applicable PRN Telly Rodriguez MD        multivitamin with minerals 1 tablet  1 tablet Oral Daily Telly Rodriguez MD   1 tablet at 11/24/24 0815    nitroglycerin (NITROSTAT) SL tablet 0.4 mg  0.4 mg Sublingual Q5 Min PRN Garrett Alicia MD        ondansetron (ZOFRAN) injection 4 mg  4 mg Intravenous Q6H PRN Garrett Alicia MD   4 mg at 11/23/24 1118    oxyCODONE-acetaminophen (PERCOCET) 5-325 MG per tablet 1 tablet  1 tablet Oral Q4H PRN Telly Rodriguez MD   1 tablet at 11/24/24 1459    Phosphorus Replacement - Follow Nurse / BPA Driven Protocol   Not Applicable PRN Telly Rodriguez MD        Potassium Replacement - Follow Nurse / BPA Driven Protocol   Not Applicable PRN Telly Rodriguez MD        pregabalin (LYRICA) capsule 75 mg  75 mg Oral Nightly Telly Rodriguez MD   75 mg at 11/23/24 2052    prochlorperazine (COMPAZINE) injection 5 mg  5 mg Intravenous Q6H PRN Telly Rodriguez MD   5 mg at 11/20/24 1456    rosuvastatin (CRESTOR) tablet 10 mg  10 mg Oral Daily Garrett Alicia MD   10 mg at 11/24/24 0815    sennosides-docusate (PERICOLACE) 8.6-50 MG per tablet 1 tablet  1 tablet Oral Nightly Garrett Alicia MD   1 tablet at 11/23/24 2052    sodium chloride 0.9 % flush 10 mL  10 mL Intravenous Q12H Garrett Alicia MD   10 mL at 11/24/24 0816    sodium chloride 0.9 % flush 10 mL  10 mL Intravenous PRN Garrett Alicia MD        sodium chloride 0.9 % infusion 40 mL  40 mL Intravenous PRN Garrett Alicia MD        tamsulosin (FLOMAX) 24 hr capsule 0.4 mg  0.4 mg Oral Daily Garrett Alicia MD   0.4 mg at 11/24/24 0815    [Held by provider] zinc sulfate (ZINCATE) capsule 220 mg  220 mg Oral Daily Telly Rodriguez MD   220 mg at 11/20/24 0836       Past Medical History:  Past Medical History:   Diagnosis Date    Arthritis     Autonomic disease     CAD (coronary artery disease) 02/06/2017    Cervical radiculopathy 09/16/2021    Chronic constipation with  acute exaccerbation 05/10/2021    Coronary artery disease     Degeneration of cervical intervertebral disc 08/11/2021    Diabetes mellitus     Diabetic foot ulcer 08/31/2020    Diabetic polyneuropathy associated with type 2 diabetes mellitus 01/18/2021    Elevated cholesterol     Gastroesophageal reflux disease 05/13/2019    Headache     HTN (hypertension), benign 05/03/2017    Hyperlipidemia     Hypertension     Mixed hyperlipidemia 02/07/2017    Multiple lung nodules 01/26/2020    5mm, 9 mm RLL identified 1/2020, not present 10/2019.    Myocardial infarction     Osteomyelitis 01/22/2020    Osteomyelitis of fifth toe of right foot 10/07/2019    Pancreatitis     Persistent insomnia 01/20/2020    Renal disorder     Sleep apnea 02/06/2017    Sleep apnea with use of continuous positive airway pressure (CPAP)     NON-COMPLIANT    Slow transit constipation 01/16/2019    Spinal stenosis in cervical region 09/16/2021    Vitamin D deficiency 03/02/2021       Past Surgical History:  Past Surgical History:   Procedure Laterality Date    ABDOMINAL SURGERY      AMPUTATION FOOT / TOE Left 10/2021    5th digit     ANTERIOR CERVICAL DISCECTOMY W/ FUSION N/A 08/05/2022    Procedure: CERVICAL DISCECTOMY ANTERIOR WITH FUSION C5-6 with possible plating of C5-7 with neuromonitoring and 1 c-arm;  Surgeon: Karel Soliz MD;  Location:  [facility] OR;  Service: Neurosurgery;  Laterality: N/A;    APPENDECTOMY      BACK SURGERY      CARDIAC CATHETERIZATION Left 02/08/2021    Procedure: Left Heart Cath w poss intervention left anatomical snuff box acess;  Surgeon: Omkar Charles DO;  Location:  [facility] CATH INVASIVE LOCATION;  Service: Cardiology;  Laterality: Left;    CARDIAC SURGERY      CATARACT EXTRACTION      CERVICAL SPINE SURGERY      COLONOSCOPY N/A 01/31/2017    Normal exam repeat in 5 years    COLONOSCOPY N/A 02/11/2019    Mild acute inflammation    COLONOSCOPY N/A 04/07/2024    2 areas at 10 and 20 cm with friability  ulceration 2 clips placed at 20 cm and 4 clips at 10 cm poor prep normal mucosa,mild eroisions and ulcerations in visible vessels    COLONOSCOPY N/A 7/1/2024    Procedure: COLONOSCOPY WITH ANESTHESIA;  Surgeon: Arsalan Lorenzo DO;  Location: [facility] ENDOSCOPY;  Service: Gastroenterology;  Laterality: N/A;  pre op constipation/diarrhea  post poor prep  pcp Del Shetty    COLONOSCOPY N/A 7/2/2024    Procedure: COLONOSCOPY WITH ANESTHESIA;  Surgeon: Agapito Christopher MD;  Location: [facility] ENDOSCOPY;  Service: Gastroenterology;  Laterality: N/A;  pre rectal bleeding  post poor prep  pcp Del Shetty MD    COLONOSCOPY W/ POLYPECTOMY  03/04/2014    Hyperplastic polyp    CORONARY ARTERY BYPASS GRAFT  10/2015    ENDOSCOPY  04/13/2011    Gastritis with hemorrhage    ENDOSCOPY N/A 05/05/2017    Normal exam    ENDOSCOPY N/A 02/11/2019    Gastritis    ENDOSCOPY N/A 09/01/2020    Non-erosive gastritis with hemorrhage    ENDOSCOPY N/A 02/10/2021    Esophagitis    ENDOSCOPY N/A 04/11/2024    There were esophageal mucosal changes suspicious for short-segment Low's esophagus present in the distal esophagus. The maximum longitudinal extent of these mucosal changes was 2 cm in length. Mucosa was biopsied with a cold forceps for histologyDistal esophagus, biopsies: Mild chronic active esophagogastritis. No evidence of intestinal metaplasia, dysplasia. Antrum, bx, Mild chronic gastritis    FOOT SURGERY Left     INCISION AND DRAINAGE OF WOUND Left 09/2007    spider bite       Family History  Family History   Problem Relation Age of Onset    Colon cancer Father     Heart disease Father     Colon cancer Sister     Colon polyps Sister     Alzheimer's disease Mother     Coronary artery disease Sister     Coronary artery disease Sister        Social History  Social History     Socioeconomic History    Marital status:    Tobacco Use    Smoking status: Former     Current packs/day: 0.00     Types: Cigarettes     Quit date:  1991     Years since quittin.9    Smokeless tobacco: Never    Tobacco comments:     smoked in highschool   Vaping Use    Vaping status: Never Used   Substance and Sexual Activity    Alcohol use: No    Drug use: No    Sexual activity: Defer       Review of Systems:  History obtained from chart review and the patient  General ROS: No fever or chills  Respiratory ROS: No cough, shortness of breath, wheezing  Cardiovascular ROS: No chest pain or palpitations  Gastrointestinal ROS: No abdominal pain or melena  Genito-Urinary ROS: No dysuria or hematuria  Psych ROS: No anxiety and depression  14 point ROS reviewed with the patient and negative except as noted above and in the HPI unless unable to obtain.    Objective:  Patient Vitals for the past 24 hrs:   BP Temp Temp src Pulse Resp SpO2   24 1507 127/71 97.8 °F (36.6 °C) Oral 68 16 99 %   24 1102 128/60 97.8 °F (36.6 °C) Oral 63 16 96 %   24 0718 116/55 97.8 °F (36.6 °C) Oral 62 16 94 %   24 0518 117/54 97.8 °F (36.6 °C) Oral 69 16 100 %   24 0121 126/63 98.1 °F (36.7 °C) Oral 65 16 98 %   24 2110 143/70 98.1 °F (36.7 °C) Oral 65 16 98 %       Intake/Output Summary (Last 24 hours) at 2024 1605  Last data filed at 2024 1308  Gross per 24 hour   Intake 720 ml   Output 950 ml   Net -230 ml     General: awake/alert   Chest:  clear to auscultation bilaterally without respiratory distress  CVS: regular rate and rhythm  Abdominal: soft, nontender, positive bowel sounds  Extremities: ble edema  Skin: warm and dry without rash      Labs:  Results from last 7 days   Lab Units 24  0643 24  0604 24  0336   WBC 10*3/mm3 5.24 6.24 7.46   HEMOGLOBIN g/dL 9.7* 10.8* 10.6*   HEMATOCRIT % 29.1* 34.0* 33.4*   PLATELETS 10*3/mm3 239 256 279         Results from last 7 days   Lab Units 24  0643 24  0604 24  0336 24  0416 24  2153   SODIUM mmol/L 139 140 138   < > 138   POTASSIUM mmol/L 4.1  4.4 4.9   < > 4.4   CHLORIDE mmol/L 106 107 104   < > 106   CO2 mmol/L 21.0* 21.0* 23.0   < > 19.0*   BUN mg/dL 45* 52* 48*   < > 53*   CREATININE mg/dL 2.38* 2.72* 3.00*   < > 2.83*   CALCIUM mg/dL 8.1* 8.3* 8.3*   < > 8.6   EGFR mL/min/1.73 29.0* 24.7* 21.9*   < > 23.5*   BILIRUBIN mg/dL  --   --   --   --  0.3   ALK PHOS U/L  --   --   --   --  134*   ALT (SGPT) U/L  --   --   --   --  19   AST (SGOT) U/L  --   --   --   --  13   GLUCOSE mg/dL 200* 144* 214*   < > 348*    < > = values in this interval not displayed.       Radiology:   Imaging Results (Last 72 Hours)       ** No results found for the last 72 hours. **            Culture:  Blood Culture   Date Value Ref Range Status   11/18/2024 No growth at 24 hours  Preliminary   11/17/2024 No growth at 24 hours  Preliminary     Urine Culture   Date Value Ref Range Status   11/17/2024 >100,000 CFU/mL Gram Negative Bacilli (A)  Preliminary         Assessment   Acute kidney injury  Chronic kidney disease stage IIIb  Hypertension  Diabetes type 2-uncontrolled by A1c 13.4  Anemia chronic kidney disease  Obesity  Metabolic acidosis  Acute cystitis     Plan:  Discussed with patient, nursing  Workup reviewed today  Monitor labs  Antibiotics per primary service  Creatinine jumped with decreased IV fluids and increased BUN as well, and improved with resumption of temporary fluids x 2, again encouraged oral intake and will monitor overnight        Willy Arteaga MD  11/24/2024  16:05 CST

## 2024-11-24 NOTE — PLAN OF CARE
Goal Outcome Evaluation:  Plan of Care Reviewed With: patient        Progress: improving  Outcome Evaluation: PT tx complete. Pt c/o back pain 6/10. Bed mob Mod Ind to EOB with HOB elevated. Sitting balance Ind. Max A to dong Cam boot to LLE. Pt stood Mod Ind with RW. Amb 50' x4 with RW and supervision. Rest breaks in-between. Will cont POC.

## 2024-11-24 NOTE — PLAN OF CARE
Goal Outcome Evaluation:  Plan of Care Reviewed With: patient        Progress: no change     A/O x 4. C/O pain. (See MAR) Assist x 1. Voiding. Dressing changed daily. Accuchecks. Pt refused long acting insulin stating he had been having a lot of lows since he lost 100lbs. Offered to put in diabetes consult. Pt Refused. VSS. Safety maintained.

## 2024-11-24 NOTE — PLAN OF CARE
Goal Outcome Evaluation:  Plan of Care Reviewed With: patient        Progress: improving  Outcome Evaluation: A&OX4, VSS. C/o chronic back and foot pain, prn pain med given with relief. ACHS accuchecks, on room air, chronic wounds to left foot and left arm, daily drsg changes. Last dose of IV ABX given today. BUN and Cr trending down, hopeful to d/c home tmmr. Assist x1 and walker to ambulate, cam boot to wear to LLE when willing. Alarms set, call light in reach. Safety maintained.

## 2024-11-24 NOTE — PROGRESS NOTES
[facility]  INPATIENT PROGRESS NOTE    Patient Name: Erick Luong  Date of Admission: 11/17/2024  Today's Date: 11/24/24  Length of Stay: 5  Primary Care Physician: Del Shetty MD    Subjective   Chief Complaint: UTI/failure to thrive/foot wound.   HPI   Creatinine is improving.  Blood pressure stable, afebrile.  Last dose of antibiotics today.  Kidney functions improving, IV fluids started by nephrology.  Patient is on room air.  Urinalysis yesterday was negative for infection.    Review of Systems   Constitutional:  Positive for activity change, appetite change and fatigue. Negative for chills and fever.   HENT:  Negative for hearing loss, nosebleeds, tinnitus and trouble swallowing.    Eyes:  Negative for visual disturbance.   Respiratory:  Negative for cough, chest tightness, shortness of breath and wheezing.    Cardiovascular:  Negative for chest pain, palpitations and leg swelling.   Gastrointestinal:  Negative for abdominal distention, abdominal pain, blood in stool, constipation, diarrhea, nausea and vomiting.   Endocrine: Negative for cold intolerance, heat intolerance, polydipsia, polyphagia and polyuria.   Genitourinary:  Negative for decreased urine volume, difficulty urinating, dysuria, flank pain, frequency and hematuria.   Musculoskeletal:  Positive for arthralgias, gait problem and myalgias. Negative for joint swelling.   Skin:  Negative for rash.   Allergic/Immunologic: Negative for immunocompromised state.   Neurological:  Positive for weakness. Negative for dizziness, syncope, light-headedness and headaches.   Hematological:  Negative for adenopathy. Does not bruise/bleed easily.   Psychiatric/Behavioral:  Negative for confusion and sleep disturbance. The patient is not nervous/anxious  All pertinent negatives and positives are as above. All other systems have been reviewed and are negative unless otherwise stated.     Objective     Temp:  [97.8 °F (36.6 °C)-98.3 °F (36.8 °C)] 97.8 °F (36.6 °C)  Heart Rate:  [62-77] 63  Resp:  [16] 16  BP: (114-143)/(54-70) 128/60  Physical Exam  Vitals and nursing note reviewed.   Constitutional:       Comments: Chronically ill.   HENT:      Head: Normocephalic.   Eyes:      Conjunctiva/sclera: Conjunctivae normal.      Pupils: Pupils are equal, round, and reactive to light.   Cardiovascular:      Rate and Rhythm: Heart rates in 63s.. Rhythm irregular.      Heart sounds: Normal heart sounds.   Pulmonary:      Effort: No respiratory distress.   Abdominal:      General: Bowel sounds are normal. There is no distension.      Palpations: Abdomen is soft.      Tenderness: There is no abdominal tenderness.      Comments: Obesity.   Musculoskeletal:         General: No swelling.      Cervical back: Neck supple.   Skin:     General: Skin is warm and dry.      Capillary Refill: Capillary refill takes 2 to 3 seconds.      Findings: No rash.      Comments: Left foot wound, dressing in place.   Neurological:      Mental Status: He is alert and oriented to person, place, and time.      Motor: Weakness present.      Coordination: Coordination abnormal.      Gait: Gait abnormal.   Psychiatric:         Mood and Affect: Mood normal.         Behavior: Behavior normal.         Thought Content: Thought content normal.          Results Review:  I have reviewed the labs, radiology results, and diagnostic studies.    Laboratory Data:   Results from last 7 days   Lab Units 11/24/24  0643 11/23/24  0604 11/22/24  0336   WBC 10*3/mm3 5.24 6.24 7.46   HEMOGLOBIN g/dL 9.7* 10.8* 10.6*   HEMATOCRIT % 29.1* 34.0* 33.4*   PLATELETS 10*3/mm3 239 256 279        Results from last 7 days   Lab Units 11/24/24  0643 11/23/24  0604 11/22/24  0336 11/18/24  0416 11/17/24  2153   SODIUM mmol/L 139 140 138   < > 138   POTASSIUM mmol/L 4.1 4.4 4.9   < > 4.4   CHLORIDE mmol/L 106 107 104   < > 106   CO2 mmol/L 21.0* 21.0* 23.0   < > 19.0*   BUN mg/dL  45* 52* 48*   < > 53*   CREATININE mg/dL 2.38* 2.72* 3.00*   < > 2.83*   CALCIUM mg/dL 8.1* 8.3* 8.3*   < > 8.6   BILIRUBIN mg/dL  --   --   --   --  0.3   ALK PHOS U/L  --   --   --   --  134*   ALT (SGPT) U/L  --   --   --   --  19   AST (SGOT) U/L  --   --   --   --  13   GLUCOSE mg/dL 200* 144* 214*   < > 348*    < > = values in this interval not displayed.       Culture Data:   Blood Culture   Date Value Ref Range Status   11/18/2024 No growth at 5 days  Final   11/17/2024 No growth at 5 days  Final     Urine Culture   Date Value Ref Range Status   11/17/2024 >100,000 CFU/mL Escherichia coli ESBL (A)  Final       Radiology Data:   Imaging Results (Last 24 Hours)       ** No results found for the last 24 hours. **            I have reviewed the patient's current medications.     Assessment/Plan   Assessment  Active Hospital Problems    Diagnosis     **Acute UTI (urinary tract infection)     Acute encephalopathy     Type 2 diabetes mellitus with hyperglycemia, with long-term current use of insulin     Atrial fibrillation     Chronic heart failure with preserved ejection fraction (HFpEF)     Stage 3b chronic kidney disease     CAD (coronary artery disease)     BPH without obstruction/lower urinary tract symptoms     Diabetic ulcer of left foot associated with type 2 diabetes mellitus     Sleep apnea with use of continuous positive airway pressure (CPAP)     Essential hypertension        HPI . 69-year-old male with past medical history of coronary artery disease, CABG, Afib, diabetes mellitus insulin-dependent, diabetic foot ulcer, hypertension, sleep apnea noncompliant with CPAP, right seventh rib fracture on 10/20/2024, presents emergency room with complaints of mental status changes disorientation suprapubic pain and burning with urination.  He appears ill and debilitated, although he is afebrile.  Blood work shows normal white blood cell count UA is consistent with UTI.  He has had a recent UTI with ESBL.       Treatment Plan     UTI . UTI urine culture-ESBL E. coli.  Normal saline 50 cc an hour.  Invanz antibiotics x 7 days, last dose antibiotics today.  Repeat urine culture yesterday was - 11/23/2024.     Nausea/vomiting this morning.  Pepcid IV.  Zofran as needed.  Tums as needed.     Acute on chronic stage IIIb renal failure/metabolic acidosis.  Nephrology consult.  Baseline creatinine 8/4/2024 1.6.   Creatinine improving.  Restart IV fluid by nephrology  Ultrasound the kidneys-Simple 4.5 cm right mid renal cyst- Otherwise normal sonographic  appearance of the kidneys, Distended normal-appearing bladder.     Acute encephalopathy due to UTI.  Patient is oriented x 3.     Recent rib fracture and chronic pain . 7 right ribs fracture 10/20/2024.  Percocet as needed.  Colace.     Diabetes mellitus type 2 insulin-dependent with hyperglycemia.  Hemoglobin A1c 13.4.  Insulin regular IV given in the ER will recheck  Continue Lantus 20 units nightly  Humalog 10 units Premeal 3 times daily and Humalog moderate dose sliding scale ACHS  Hypoglycemia protocol . Uncontrolled diabetes discussed with patient.  This to be a chronic management through his primary care physician, discussed the patient.     Diabetic foot ulcers left foot.  Patient follow-up with wound care outpatient [facility], Dr. Gomes, per patient.  Consult wound care.  See wound image in media section of chart, the wound appears clean with good margins.  Continue local care and podiatry follow-up outpatient     Atrial fibrillation/CAD/chronic diastolic congestive heart failure/CABG/hypertension/hyperlipidemia.  Continue Eliquis 5 mg p.o. twice daily.  Hold Bumex by nephrology.  Lisinopril . Crestor.  Coreg was discontinued during the last hospitalization in August he does not list any other rate controlling medications.  Echocardiogram 4/2/2024-61 - 65%, mild concentric hypertrophy, left ventricular diastolic function is consistent with (grade III w/high LAP), Mildly  reduced right ventricular systolic function, left atrial cavity is mildly dilated, mild calcification of the aortic valve, No aortic valve regurgitation is present, No hemodynamically significant aortic valve stenosis is present.     Obstructive sleep apnea patient noncompliant with CPAP  Patient is on room air.     Anemia.  Hemoglobin slight decrease..  No sign of acute bleed.     DVT prophylaxis patient anticoagulated     Dementia.  Hold Aricept.     Neuropathy.  Lyrica nightly.     Constipation.  Sarah-Colace.     Nausea . Zofran as needed.     Depression/anxiety.  Cut back Cymbalta.    BuSpar as needed.     Prostate hypertrophy.  Flomax.     Nutrition.  Cardiac/diabetic diet.  Hold Vitamin C.  Hold zinc for now.     Deconditioning.  Fall precaution.  PT OT consult.     Blood culture-no growth for 5 days..  Urine culture-E. coli ESBL.  Respiratory panel-negative.     Medical Decision Making  Number and Complexity of problems: UTI/altered mental//diabetes/CAD/left foot wound  Differential Diagnosis: None     Conditions and Status        Condition is unchanged.     MDM Data  External documents reviewed: None.  Cardiac tracing (EKG, telemetry) interpretation: Irregular, rates 59  Radiology interpretation: Echo  Labs reviewed: Laboratory  Any tests that were considered but not ordered: Lab in a.m.     Decision rules/scores evaluated (example [id number], Wells, etc): None     Discussed with: Patient     Care Planning  Shared decision making: Patient  Code status and discussions: Full code.     Disposition  Social Determinants of Health that impact treatment or disposition: From home.  1 to 3 days         Electronically signed by Telly Rodriguez MD, 11/24/24, 14:29 CST.

## 2024-11-25 LAB
ANION GAP SERPL CALCULATED.3IONS-SCNC: 10 MMOL/L (ref 5–15)
BUN SERPL-MCNC: 46 MG/DL (ref 8–23)
BUN/CREAT SERPL: 18 (ref 7–25)
CALCIUM SPEC-SCNC: 8.1 MG/DL (ref 8.6–10.5)
CHLORIDE SERPL-SCNC: 107 MMOL/L (ref 98–107)
CO2 SERPL-SCNC: 21 MMOL/L (ref 22–29)
CREAT SERPL-MCNC: 2.55 MG/DL (ref 0.76–1.27)
DEPRECATED RDW RBC AUTO: 51.7 FL (ref 37–54)
EGFRCR SERPLBLD CKD-EPI 2021: 26.7 ML/MIN/1.73
ERYTHROCYTE [DISTWIDTH] IN BLOOD BY AUTOMATED COUNT: 15.9 % (ref 12.3–15.4)
GLUCOSE BLDC GLUCOMTR-MCNC: 148 MG/DL (ref 70–130)
GLUCOSE BLDC GLUCOMTR-MCNC: 169 MG/DL (ref 70–130)
GLUCOSE BLDC GLUCOMTR-MCNC: 205 MG/DL (ref 70–130)
GLUCOSE BLDC GLUCOMTR-MCNC: 223 MG/DL (ref 70–130)
GLUCOSE SERPL-MCNC: 196 MG/DL (ref 65–99)
HCT VFR BLD AUTO: 29.2 % (ref 37.5–51)
HGB BLD-MCNC: 9.4 G/DL (ref 13–17.7)
MCH RBC QN AUTO: 28.5 PG (ref 26.6–33)
MCHC RBC AUTO-ENTMCNC: 32.2 G/DL (ref 31.5–35.7)
MCV RBC AUTO: 88.5 FL (ref 79–97)
PLATELET # BLD AUTO: 233 10*3/MM3 (ref 140–450)
PMV BLD AUTO: 11.3 FL (ref 6–12)
POTASSIUM SERPL-SCNC: 4.4 MMOL/L (ref 3.5–5.2)
RBC # BLD AUTO: 3.3 10*6/MM3 (ref 4.14–5.8)
SODIUM SERPL-SCNC: 138 MMOL/L (ref 136–145)
WBC NRBC COR # BLD AUTO: 5.45 10*3/MM3 (ref 3.4–10.8)

## 2024-11-25 PROCEDURE — 80048 BASIC METABOLIC PNL TOTAL CA: CPT | Performed by: FAMILY MEDICINE

## 2024-11-25 PROCEDURE — 63710000001 INSULIN LISPRO (HUMAN) PER 5 UNITS: Performed by: FAMILY MEDICINE

## 2024-11-25 PROCEDURE — 97110 THERAPEUTIC EXERCISES: CPT

## 2024-11-25 PROCEDURE — 25010000002 ONDANSETRON PER 1 MG: Performed by: FAMILY MEDICINE

## 2024-11-25 PROCEDURE — 97530 THERAPEUTIC ACTIVITIES: CPT

## 2024-11-25 PROCEDURE — 82948 REAGENT STRIP/BLOOD GLUCOSE: CPT

## 2024-11-25 PROCEDURE — 25010000002 ERTAPENEM PER 500 MG: Performed by: STUDENT IN AN ORGANIZED HEALTH CARE EDUCATION/TRAINING PROGRAM

## 2024-11-25 PROCEDURE — 85027 COMPLETE CBC AUTOMATED: CPT | Performed by: FAMILY MEDICINE

## 2024-11-25 RX ORDER — OXYCODONE AND ACETAMINOPHEN 5; 325 MG/1; MG/1
1 TABLET ORAL EVERY 4 HOURS PRN
Status: DISCONTINUED | OUTPATIENT
Start: 2024-11-25 | End: 2024-12-01 | Stop reason: HOSPADM

## 2024-11-25 RX ADMIN — Medication 10 ML: at 08:24

## 2024-11-25 RX ADMIN — PREGABALIN 75 MG: 75 CAPSULE ORAL at 20:12

## 2024-11-25 RX ADMIN — DULOXETINE HYDROCHLORIDE 30 MG: 30 CAPSULE, DELAYED RELEASE ORAL at 08:23

## 2024-11-25 RX ADMIN — OXYCODONE HYDROCHLORIDE AND ACETAMINOPHEN 1 TABLET: 5; 325 TABLET ORAL at 08:30

## 2024-11-25 RX ADMIN — Medication 1 TABLET: at 08:23

## 2024-11-25 RX ADMIN — OXYCODONE HYDROCHLORIDE AND ACETAMINOPHEN 1 TABLET: 5; 325 TABLET ORAL at 01:16

## 2024-11-25 RX ADMIN — OXYCODONE HYDROCHLORIDE AND ACETAMINOPHEN 1 TABLET: 5; 325 TABLET ORAL at 20:13

## 2024-11-25 RX ADMIN — FAMOTIDINE 20 MG: 10 INJECTION INTRAVENOUS at 08:24

## 2024-11-25 RX ADMIN — INSULIN LISPRO 4 UNITS: 100 INJECTION, SOLUTION INTRAVENOUS; SUBCUTANEOUS at 20:14

## 2024-11-25 RX ADMIN — ERTAPENEM 1000 MG: 1 INJECTION INTRAMUSCULAR; INTRAVENOUS at 20:40

## 2024-11-25 RX ADMIN — ROSUVASTATIN 10 MG: 10 TABLET, FILM COATED ORAL at 08:23

## 2024-11-25 RX ADMIN — ONDANSETRON 4 MG: 2 INJECTION INTRAMUSCULAR; INTRAVENOUS at 22:06

## 2024-11-25 RX ADMIN — DOCUSATE SODIUM 100 MG: 100 CAPSULE, LIQUID FILLED ORAL at 20:13

## 2024-11-25 RX ADMIN — TAMSULOSIN HYDROCHLORIDE 0.4 MG: 0.4 CAPSULE ORAL at 08:22

## 2024-11-25 RX ADMIN — LISINOPRIL 5 MG: 10 TABLET ORAL at 08:23

## 2024-11-25 RX ADMIN — INSULIN LISPRO 4 UNITS: 100 INJECTION, SOLUTION INTRAVENOUS; SUBCUTANEOUS at 17:29

## 2024-11-25 RX ADMIN — DOCUSATE SODIUM 50 MG AND SENNOSIDES 8.6 MG 1 TABLET: 8.6; 5 TABLET, FILM COATED ORAL at 20:12

## 2024-11-25 RX ADMIN — APIXABAN 5 MG: 5 TABLET, FILM COATED ORAL at 20:12

## 2024-11-25 RX ADMIN — INSULIN LISPRO 2 UNITS: 100 INJECTION, SOLUTION INTRAVENOUS; SUBCUTANEOUS at 08:23

## 2024-11-25 RX ADMIN — DOCUSATE SODIUM 100 MG: 100 CAPSULE, LIQUID FILLED ORAL at 08:22

## 2024-11-25 RX ADMIN — APIXABAN 5 MG: 5 TABLET, FILM COATED ORAL at 08:23

## 2024-11-25 NOTE — PLAN OF CARE
Goal Outcome Evaluation:  Plan of Care Reviewed With: patient        Progress: improving     A/O x 4. C/O pain. (See MAR) accu checks. refused long acting insulin. Daily dressing changed. IID. Assist x 1. Voiding. VSS. Safety maintained.

## 2024-11-25 NOTE — CASE MANAGEMENT/SOCIAL WORK
Continued Stay Note  MAYCOL Lobo     Patient Name: Erick Luong  MRN: [id number]  Today's Date: 11/25/2024    Admit Date: 11/17/2024    Plan: Home   Discharge Plan       Row Name 11/25/24 1341       Plan    Plan Home    Patient/Family in Agreement with Plan yes    Plan Comments Pt has been working with therapy. Plan is for pt to return home at d/c.                   Discharge Codes    No documentation.                       MEJIA Carreno

## 2024-11-25 NOTE — THERAPY TREATMENT NOTE
Acute Care - Physical Therapy Treatment Note  [facility]     Patient Name: Erick Luong  : 1955  MRN: [id number]  Today's Date: 2024      Visit Dx:     ICD-10-CM ICD-9-CM   1. Acute UTI (urinary tract infection)  N39.0 599.0   2. Acute renal failure superimposed on chronic kidney disease, unspecified acute renal failure type, unspecified CKD stage  N17.9 584.9    N18.9 585.9   3. Acute hyperglycemia  R73.9 790.29   4. Impaired functional mobility and activity tolerance [Z74.09]  Z74.09 V49.89     Patient Active Problem List   Diagnosis    Obesity, unspecified obesity severity, unspecified obesity type    Essential hypertension    Type 2 diabetes mellitus with hyperglycemia, with long-term current use of insulin    Nonsmoker    Anemia due to chronic kidney disease    Class 3 severe obesity due to excess calories with body mass index (BMI) of 40.0 to 44.9 in adult    Anasarca    Sleep apnea with use of continuous positive airway pressure (CPAP)    Medically noncompliant    Diabetic ulcer of left foot associated with type 2 diabetes mellitus    Diabetic polyneuropathy associated with type 2 diabetes mellitus    Spinal stenosis in cervical region    Degeneration of cervical intervertebral disc    Cervical radiculopathy    Degeneration of lumbar or lumbosacral intervertebral disc    Cervical myelopathy    Bilateral carpal tunnel syndrome    CAD (coronary artery disease)    GERD without esophagitis    BPH without obstruction/lower urinary tract symptoms    Stage 3b chronic kidney disease    Chronic diastolic heart failure    Type 2 myocardial infarction due to heart failure    Left carpal tunnel syndrome    Syncope and collapse, recurrent episodes    Poorly-controlled hypertension    Rhinovirus    Peripheral vascular disease    Chronic kidney disease (CKD), stage IV (severe)    Diabetic foot infection    Sepsis    Epigastric pain    Chronic heart failure with preserved ejection fraction (HFpEF)    Sepsis  due to methicillin resistant Staphylococcus aureus (MRSA) with encephalopathy without septic shock    Slow transit constipation    CHF exacerbation    CHF (congestive heart failure), NYHA class I, acute on chronic, combined    Rectal bleeding    Acute on chronic heart failure with preserved ejection fraction (HFpEF)    DKA, type 2, not at goal    Atrial fibrillation    E. coli UTI, ESBL    Acute UTI (urinary tract infection)    Acute encephalopathy    Type 2 diabetes mellitus with hyperglycemia, with long-term current use of insulin     Past Medical History:   Diagnosis Date    Arthritis     Autonomic disease     CAD (coronary artery disease) 02/06/2017    Cervical radiculopathy 09/16/2021    Chronic constipation with acute exaccerbation 05/10/2021    Coronary artery disease     Degeneration of cervical intervertebral disc 08/11/2021    Diabetes mellitus     Diabetic foot ulcer 08/31/2020    Diabetic polyneuropathy associated with type 2 diabetes mellitus 01/18/2021    Elevated cholesterol     Gastroesophageal reflux disease 05/13/2019    Headache     HTN (hypertension), benign 05/03/2017    Hyperlipidemia     Hypertension     Mixed hyperlipidemia 02/07/2017    Multiple lung nodules 01/26/2020    5mm, 9 mm RLL identified 1/2020, not present 10/2019.    Myocardial infarction     Osteomyelitis 01/22/2020    Osteomyelitis of fifth toe of right foot 10/07/2019    Pancreatitis     Persistent insomnia 01/20/2020    Renal disorder     Sleep apnea 02/06/2017    Sleep apnea with use of continuous positive airway pressure (CPAP)     NON-COMPLIANT    Slow transit constipation 01/16/2019    Spinal stenosis in cervical region 09/16/2021    Vitamin D deficiency 03/02/2021     Past Surgical History:   Procedure Laterality Date    ABDOMINAL SURGERY      AMPUTATION FOOT / TOE Left 10/2021    5th digit     ANTERIOR CERVICAL DISCECTOMY W/ FUSION N/A 08/05/2022    Procedure: CERVICAL DISCECTOMY ANTERIOR WITH FUSION C5-6 with possible  plating of C5-7 with neuromonitoring and 1 c-arm;  Surgeon: Karel Soliz MD;  Location: [facility] OR;  Service: Neurosurgery;  Laterality: N/A;    APPENDECTOMY      BACK SURGERY      CARDIAC CATHETERIZATION Left 02/08/2021    Procedure: Left Heart Cath w poss intervention left anatomical snuff box acess;  Surgeon: Omkar Charles DO;  Location: [facility] CATH INVASIVE LOCATION;  Service: Cardiology;  Laterality: Left;    CARDIAC SURGERY      CATARACT EXTRACTION      CERVICAL SPINE SURGERY      COLONOSCOPY N/A 01/31/2017    Normal exam repeat in 5 years    COLONOSCOPY N/A 02/11/2019    Mild acute inflammation    COLONOSCOPY N/A 04/07/2024    2 areas at 10 and 20 cm with friability ulceration 2 clips placed at 20 cm and 4 clips at 10 cm poor prep normal mucosa,mild eroisions and ulcerations in visible vessels    COLONOSCOPY N/A 7/1/2024    Procedure: COLONOSCOPY WITH ANESTHESIA;  Surgeon: Arsalan Lorenzo DO;  Location: [facility] ENDOSCOPY;  Service: Gastroenterology;  Laterality: N/A;  pre op constipation/diarrhea  post poor prep  pcp Del Shetty    COLONOSCOPY N/A 7/2/2024    Procedure: COLONOSCOPY WITH ANESTHESIA;  Surgeon: Agapito Christopher MD;  Location: [facility] ENDOSCOPY;  Service: Gastroenterology;  Laterality: N/A;  pre rectal bleeding  post poor prep  pcp Del Shetty MD    COLONOSCOPY W/ POLYPECTOMY  03/04/2014    Hyperplastic polyp    CORONARY ARTERY BYPASS GRAFT  10/2015    ENDOSCOPY  04/13/2011    Gastritis with hemorrhage    ENDOSCOPY N/A 05/05/2017    Normal exam    ENDOSCOPY N/A 02/11/2019    Gastritis    ENDOSCOPY N/A 09/01/2020    Non-erosive gastritis with hemorrhage    ENDOSCOPY N/A 02/10/2021    Esophagitis    ENDOSCOPY N/A 04/11/2024    There were esophageal mucosal changes suspicious for short-segment Low's esophagus present in the distal esophagus. The maximum longitudinal extent of these mucosal changes was 2 cm in length. Mucosa was biopsied with a cold forceps for  histologyDistal esophagus, biopsies: Mild chronic active esophagogastritis. No evidence of intestinal metaplasia, dysplasia. Antrum, bx, Mild chronic gastritis    FOOT SURGERY Left     INCISION AND DRAINAGE OF WOUND Left 09/2007    spider bite     PT Assessment (Last 12 Hours)       PT Evaluation and Treatment       Row Name 11/25/24 1031          Physical Therapy Time and Intention    Subjective Information no complaints  -KJ     Document Type therapy note (daily note)  -KJ     Mode of Treatment physical therapy  -KJ     Patient Effort good  -KJ       Row Name 11/25/24 1031          General Information    Existing Precautions/Restrictions fall;non-weight bearing;left  pt is NWB LLE, but he reports he walks at home  -KJ       Row Name 11/25/24 1031          Pain    Pretreatment Pain Rating 5/10  -KJ     Posttreatment Pain Rating 5/10  -KJ     Pain Side/Orientation generalized  -KJ       Row Name 11/25/24 1031          Mobility    Extremity Weight-bearing Status left lower extremity  -KJ     Left Lower Extremity (Weight-bearing Status) non weight-bearing (NWB)   does not maintain NWB  -KJ       Row Name 11/25/24 1031          Bed Mobility    Supine-Sit [place] (Bed Mobility) independent  -KJ     Comment, (Bed Mobility) left pt sitting EOB  -KJ       Row Name 11/25/24 1031          Sit-Stand Transfer    Sit-Stand [place] (Transfers) standby assist  -KJ     Assistive Device (Sit-Stand Transfers) walker, front-wheeled  -KJ     Comment, (Sit-Stand Transfer) posterior lean x 2 blocked his legs against bed  -KJ       Row Name 11/25/24 1031          Stand-Sit Transfer    Stand-Sit [place] (Transfers) independent  -KJ       Row Name 11/25/24 1031          Gait/Stairs (Locomotion)    [place] Level (Gait) contact guard  -KJ     Assistive Device (Gait) walker, front-wheeled  -KJ     Distance in Feet (Gait) 20  -KJ       Row Name 11/25/24 1031          Motor Skills    Therapeutic Exercise aerobic  -KJ        Row Name 11/25/24 1031          Aerobic Exercise    Comment, Aerobic Exercise (Therapeutic Exercise) AROM BLE x 20 reps  -KJ       Row Name             Wound 08/22/23 0140 Left posterior plantar    Wound - Properties Group Placement Date: 08/22/23  -AM Placement Time: 0140  -AM Side: Left  -AM Orientation: posterior  -AM Location: plantar  -AM Present on Original Admission: Y  -AM    Retired Wound - Properties Group Placement Date: 08/22/23  -AM Placement Time: 0140  -AM Present on Original Admission: Y  -AM Side: Left  -AM Orientation: posterior  -AM Location: plantar  -AM    Retired Wound - Properties Group Placement Date: 08/22/23  -AM Placement Time: 0140  -AM Present on Original Admission: Y  -AM Side: Left  -AM Orientation: posterior  -AM Location: plantar  -AM    Retired Wound - Properties Group Date first assessed: 08/22/23  -AM Time first assessed: 0140  -AM Present on Original Admission: Y  -AM Side: Left  -AM Location: plantar  -AM      Row Name             Wound 06/15/23 0102 Left anterior plantar    Wound - Properties Group Placement Date: 06/15/23  -SM Placement Time: 0102  -SM Side: Left  -SM Orientation: anterior  -SM Location: plantar  -SM Removal Date: --  -JR Removal Time: --  -JR    Retired Wound - Properties Group Placement Date: 06/15/23  -SM Placement Time: 0102  -SM Side: Left  -SM Orientation: anterior  -SM Location: plantar  -SM Removal Date: --  -JR Removal Time: --  -JR    Retired Wound - Properties Group Placement Date: 06/15/23  -SM Placement Time: 0102  -SM Side: Left  -SM Orientation: anterior  -SM Location: plantar  -SM Removal Date: --  -JR Removal Time: --  -JR    Retired Wound - Properties Group Date first assessed: 06/15/23  -SM Time first assessed: 0102  -SM Side: Left  -SM Location: plantar  -SM Resolution Date: --  -JR Resolution Time: --  -JR      Row Name             Wound 11/18/24 0046 Left lower arm skin tear;abrasion    Wound - Properties Group Placement Date: 11/18/24   -JR Placement Time: 0046 -JR Side: Left  -JR Orientation: lower  -JR Location: arm  -JR Primary Wound Type: Abrasion  -JR Type: skin tear;abrasion  -JR Present on Original Admission: Y  -JR    Retired Wound - Properties Group Placement Date: 11/18/24  -JR Placement Time: 0046 -JR Present on Original Admission: Y  -JR Side: Left  -JR Orientation: lower  -JR Location: arm  -JR Primary Wound Type: Abrasion  -JR Type: skin tear;abrasion  -JR    Retired Wound - Properties Group Placement Date: 11/18/24  -JR Placement Time: 0046 -JR Present on Original Admission: Y  -JR Side: Left  -JR Orientation: lower  -JR Location: arm  -JR Primary Wound Type: Abrasion  -JR Type: skin tear;abrasion  -JR    Retired Wound - Properties Group Date first assessed: 11/18/24  -JR Time first assessed: 0046 -JR Present on Original Admission: Y  -JR Side: Left  -JR Location: arm  -JR Primary Wound Type: Abrasion  -JR Type: skin tear;abrasion  -JR      Row Name             Wound 11/18/24 0056 Left medial foot diabetic ulcer    Wound - Properties Group Placement Date: 11/18/24  -JR Placement Time: 0056 -JR Side: Left  -JR Orientation: medial  -JR Location: foot  -JR Primary Wound Type: Diabetic ulc  -JR Type: diabetic ulcer  -JR Present on Original Admission: Y  -JR    Retired Wound - Properties Group Placement Date: 11/18/24  -JR Placement Time: 0056 -JR Present on Original Admission: Y  -JR Side: Left  -JR Orientation: medial  -JR Location: foot  -JR Primary Wound Type: Diabetic ulc  -JR Type: diabetic ulcer  -JR    Retired Wound - Properties Group Placement Date: 11/18/24  -JR Placement Time: 0056 -JR Present on Original Admission: Y  -JR Side: Left  -JR Orientation: medial  -JR Location: foot  -JR Primary Wound Type: Diabetic ulc  -JR Type: diabetic ulcer  -JR    Retired Wound - Properties Group Date first assessed: 11/18/24  -JR Time first assessed: 0056 -JR Present on Original Admission: Y  -JR Side: Left  -JR Location: foot  -JR  Primary Wound Type: Diabetic ulc  -JR Type: diabetic ulcer  -JR      Row Name 11/25/24 1031          Positioning and Restraints    Pre-Treatment Position in bed  -KJ     Post Treatment Position bed  -KJ     In Bed sitting EOB;call light within reach  -KJ               User Key  (r) = Recorded By, (t) = Taken By, (c) = Cosigned By      Initials Name Provider Type    Claudia Maddox PTA Physical Therapist Assistant    Faviola Kunz, RN Registered Nurse    Michelle Diaz RN Registered Nurse    Sabas Young LPNA Licensed Nurse    Sabas Young RN Registered Nurse                    Physical Therapy Education       Title: PT OT SLP Therapies (In Progress)       Topic: Physical Therapy (Not Started)       Point: Mobility training (In Progress)       Learning Progress Summary            Patient Acceptance, E, NR by AE at 11/21/2024 0850    Comment: NWB L LE and need for CAM boot    Acceptance, E, VU by JACIEL at 11/19/2024 0958    Comment: limited gait until boot for RLE    Acceptance, E,TB,D, VU,NR by NIKO at 11/18/2024 1128    Comment: education re: purpose of PT/importance of activity, safety/falls prevention, NWB L LE due to open wounds, improved tech w/ tfers, positioning w/ L LE elevated                      Point: Home exercise program (In Progress)       Learning Progress Summary            Patient Acceptance, E, NR by AE at 11/21/2024 0850    Comment: NWB L LE and need for CAM boot                      Point: Precautions (Done)       Learning Progress Summary            Patient Acceptance, E,TB,D, VU,NR by NIKO at 11/18/2024 1128    Comment: education re: purpose of PT/importance of activity, safety/falls prevention, NWB L LE due to open wounds, improved tech w/ tfers, positioning w/ L LE elevated                                      User Key       Initials Effective Dates Name Provider Type Discipline    SELENE 02/03/23 -  Hansa Arambula PTA Physical Therapist Assistant PT    JACIEL 02/03/23 -   Sabas Maharaj PTA Physical Therapist Assistant PT    JE 08/02/18 -  Melly Mustafa PT Physical Therapist PT                  PT Recommendation and Plan         Outcome Measures       Row Name 11/24/24 1437             How much help from another person do you currently need...    Turning from your back to your side while in flat bed without using bedrails? 4  -TB      Moving from lying on back to sitting on the side of a flat bed without bedrails? 4  -TB      Moving to and from a bed to a chair (including a wheelchair)? 4  -TB      Standing up from a chair using your arms (e.g., wheelchair, bedside chair)? 4  -TB      Climbing 3-5 steps with a railing? 3  -TB      To walk in hospital room? 4  -TB      AM-PAC 6 Clicks Score (PT) 23  -TB         Functional Assessment    Outcome Measure Options AM-PAC 6 Clicks Basic Mobility (PT)  -TB                User Key  (r) = Recorded By, (t) = Taken By, (c) = Cosigned By      Initials Name Provider Type    Wendy Duvall PTA Physical Therapist Assistant                     Time Calculation:    PT Charges       Row Name 11/25/24 1050             Time Calculation    Start Time 1031  -KJ      Stop Time 1054  -KJ      Time Calculation (min) 23 min  -KJ      PT Received On 11/25/24  -KJ      PT Goal Re-Cert Due Date 11/28/24  -KJ         Time Calculation- PT    Total Timed Code Minutes- PT 23 minute(s)  -KJ                User Key  (r) = Recorded By, (t) = Taken By, (c) = Cosigned By      Initials Name Provider Type     Claudia Prakash PTA Physical Therapist Assistant                  Therapy Charges for Today       Code Description Service Date Service Provider Modifiers Qty    58989871245 HC PT THER PROC EA 15 MIN 11/25/2024 Claudia Prakash, PARUL GP 1    06980495860 HC PT THERAPEUTIC ACT EA 15 MIN 11/25/2024 Claudia Prakash, PTA GP 1            PT G-Codes  Outcome Measure Options: AM-PAC 6 Clicks Basic Mobility (PT)  AM-PAC 6 Clicks Score (PT): 22  AM-PAC 6  Clicks Score (OT): 22    Claudia Prakash, PTA  11/25/2024

## 2024-11-25 NOTE — PROGRESS NOTES
Nephrology ([facility]) Progress Note      Patient:  Erick Luong  YOB: 1955  Date of Service: 11/25/2024  MRN: [id number]   Acct: [id number]   Primary Care Physician: Del Shetty MD  Advance Directive:   Code Status and Medical Interventions: CPR (Attempt to Resuscitate); Full Support   Ordered at: 11/18/24 0159     Code Status (Patient has no pulse and is not breathing):    CPR (Attempt to Resuscitate)     Medical Interventions (Patient has pulse or is breathing):    Full Support     Admit Date: 11/17/2024       Hospital Day: 6  Referring Provider: Garrett Alicia,*      Patient personally seen and examined.  Complete chart including Consults, Notes, Operative Reports, Labs, Cardiology, and Radiology studies reviewed as able.        Subjective:  Erick Luong is a 69 y.o. male for whom we were consulted for evaluation and treatment of acute kidney injury. Baseline chronic kidney disease stage 3b, baseline creatinine approximately 1.5-1.8.  Follows in our office. History of uncontrolled diabetes, hypertension, coronary artery disease, diabetic foot ulcer, obesity. Admitted with cystitis, foot wound and failure to thrive. Minimal PO intake recently. Had no specific complaints at time of nephrology initial exam. Hospital course remarkable for administration of IV fluids and reduction of Bumex dose. Bumex was stopped entirely afternoon of 11/21. Has been receiving IV fluids intermittently since then and renal function has been fluctuating.    Today is awake and alert. No new complaint or overnight issues.  Urine output nonoliguric     Allergies:  Cefepime, Bactrim [sulfamethoxazole-trimethoprim], Vancomycin, Zolpidem, and Metronidazole    Home Meds:  Medications Prior to Admission   Medication Sig Dispense Refill Last Dose/Taking    apixaban (Eliquis) 5 MG tablet tablet Take 1 tablet by mouth 2 (Two) Times a Day. 60 tablet 0 Taking    ascorbic acid (VITAMIN C)  1000 MG tablet Take 1 tablet by mouth Daily. 30 tablet 3 Taking    bumetanide (BUMEX) 2 MG tablet Take 1 tablet by mouth 2 (Two) Times a Day. 6 tablet 3 Taking    busPIRone (BUSPAR) 10 MG tablet Take 1 tablet by mouth 3 (Three) Times a Day As Needed. (Patient taking differently: Take 1 tablet by mouth 3 (Three) Times a Day As Needed (anxiety).) 270 tablet 4 Taking Differently    donepezil (ARICEPT) 10 MG tablet Take 1 tablet by mouth every night at bedtime. 30 tablet 0 Taking    DULoxetine (CYMBALTA) 60 MG capsule Take 1 capsule by mouth Daily. 30 capsule 0 Taking    famotidine (PEPCID) 20 MG tablet Take 1 tablet by mouth 2 (Two) Times a Day. 60 tablet 0 Taking    insulin glargine (Lantus) 100 UNIT/ML injection Inject 35 Units under the skin into the appropriate area as directed every night at bedtime. 10 mL 0 Taking    lisinopril (PRINIVIL,ZESTRIL) 5 MG tablet Take 1 tablet by mouth Daily. 30 tablet 0 Taking    melatonin 3 MG tablet Take 2 tablets by mouth At Night As Needed for Sleep. 30 tablet 0 Taking As Needed    midodrine (PROAMATINE) 2.5 MG tablet Take 1 tablet by mouth 2 (Two) Times a Day. 60 tablet 5 Taking    oxyCODONE (ROXICODONE) 10 MG tablet Take 1 tablet by mouth 2 (Two) Times a Day As Needed. (Patient taking differently: Take 1 tablet by mouth 2 (Two) Times a Day As Needed for Moderate Pain or Severe Pain. *must last 30 days*) 55 tablet 0 Taking Differently    pantoprazole (PROTONIX) 40 MG EC tablet Take 1 tablet by mouth 2 (Two) Times a Day. 60 tablet 0 Taking    polyethylene glycol (MIRALAX) 17 GM/SCOOP powder Take 17 g by mouth Daily As Needed (constipation).   Taking As Needed    potassium chloride ER (K-TAB) 20 MEQ tablet controlled-release ER tablet Take 1 tablet by mouth Daily. 30 tablet 0 Taking    rosuvastatin (CRESTOR) 10 MG tablet Take 1 tablet by mouth Daily. 30 tablet 0 Taking    sennosides-docusate (PERICOLACE) 8.6-50 MG per tablet Take 1 tablet by mouth Every Night. Obtain OTC   Taking     tamsulosin (FLOMAX) 0.4 MG capsule 24 hr capsule Take 1 capsule by mouth Daily. 90 capsule 4 Taking    Insulin Lispro (humaLOG) 100 UNIT/ML injection Inject 2-12 Units under the skin into the appropriate area as directed 4 (Four) Times a Day Before Meals & at Bedtime. Inject subcutaneously four times a day per sliding scale:  0-150 = 0 units; 151-200 = 2u; 201-250 = 4u; 251-300 = 6u; 301-350 = 8u; 351-400 = 10u; 401+ = 12u       nitroglycerin (NITROSTAT) 0.4 MG SL tablet Place 1 tablet under the tongue Every 5 (Five) Minutes As Needed for Chest Pain. Take no more than 3 doses in 15 minutes.       pregabalin (LYRICA) 100 MG capsule Take 1 capsule by mouth Daily.       pregabalin (LYRICA) 100 MG capsule Take 2 capsules by mouth Every Night.       sodium hypochlorite (DAKIN'S 1/4 STRENGTH) 0.125 % solution topical solution 0.125% Apply  topically to the appropriate area as directed 2 (Two) Times a Day. 473 mL 5        Medicines:  Current Facility-Administered Medications   Medication Dose Route Frequency Provider Last Rate Last Admin    acetaminophen (TYLENOL) tablet 650 mg  650 mg Oral Q4H PRN Garrett Alicia MD        Or    acetaminophen (TYLENOL) 160 MG/5ML oral solution 650 mg  650 mg Oral Q4H PRN Garrett Alicia MD        Or    acetaminophen (TYLENOL) suppository 650 mg  650 mg Rectal Q4H PRN Garrett Alicia MD        apixaban (ELIQUIS) tablet 5 mg  5 mg Oral BID Garrett Alicia MD   5 mg at 11/25/24 0823    [Held by provider] ascorbic acid (VITAMIN C) tablet 500 mg  500 mg Oral BID Telly Rodriguez MD   500 mg at 11/20/24 0837    sennosides-docusate (PERICOLACE) 8.6-50 MG per tablet 2 tablet  2 tablet Oral BID PRN Garrett Alicia MD        And    polyethylene glycol (MIRALAX) packet 17 g  17 g Oral Daily PRN Garrett Alicia MD        And    bisacodyl (DULCOLAX) EC tablet 5 mg  5 mg Oral Daily PRN Garrett Alicia MD   5 mg at 11/22/24 0954    And     bisacodyl (DULCOLAX) suppository 10 mg  10 mg Rectal Daily PRN Garrett Alicia MD        [Held by provider] bumetanide (BUMEX) tablet 1 mg  1 mg Oral BID Telly Rodriguez MD   1 mg at 11/21/24 0914    busPIRone (BUSPAR) tablet 10 mg  10 mg Oral TID PRN Garrett Alicia MD   10 mg at 11/20/24 2055    calcium carbonate (TUMS) chewable tablet 500 mg (200 mg elemental)  2 tablet Oral TID PRN Telly Rodriguez MD   2 tablet at 11/20/24 1459    Calcium Replacement - Follow Nurse / BPA Driven Protocol   Not Applicable PRN Telly Rodriguez MD        dextrose (D50W) (25 g/50 mL) IV injection 25 g  25 g Intravenous Q15 Min PRN Garrett Alicia MD        dextrose (GLUTOSE) oral gel 15 g  15 g Oral Q15 Min PRN Garrett Alicia MD   15 g at 11/19/24 0040    docusate sodium (COLACE) capsule 100 mg  100 mg Oral BID Telly Rodriguez MD   100 mg at 11/25/24 0822    [Held by provider] donepezil (ARICEPT) tablet 10 mg  10 mg Oral Nightly Garrett Alicia MD   10 mg at 11/18/24 0219    DULoxetine (CYMBALTA) DR capsule 30 mg  30 mg Oral Daily Telly Rodriguez MD   30 mg at 11/25/24 0823    famotidine (PEPCID) injection 20 mg  20 mg Intravenous Daily Telly Rodriguez MD   20 mg at 11/25/24 0824    glucagon (GLUCAGEN) injection 1 mg  1 mg Intramuscular Q15 Min PRN Garrett Alicia MD        insulin glargine (LANTUS, SEMGLEE) injection 20 Units  20 Units Subcutaneous Nightly Garrett Alicia MD   20 Units at 11/18/24 2143    Insulin Lispro (humaLOG) injection 10 Units  10 Units Subcutaneous TID With Meals Garrett Alicia MD   10 Units at 11/24/24 1705    Insulin Lispro (humaLOG) injection 2-9 Units  2-9 Units Subcutaneous 4x Daily AC & at Bedtime Garrett Alicia MD   2 Units at 11/25/24 0823    lisinopril (PRINIVIL,ZESTRIL) tablet 5 mg  5 mg Oral Daily Garrett Alicia MD   5 mg at 11/25/24 0823    Magnesium Standard Dose Replacement - Follow Nurse / BPA Driven  Protocol   Not Applicable PRN Telly Rodriguez MD        multivitamin with minerals 1 tablet  1 tablet Oral Daily Telly Rodriguez MD   1 tablet at 11/25/24 0823    nitroglycerin (NITROSTAT) SL tablet 0.4 mg  0.4 mg Sublingual Q5 Min PRN Garrett Alicia MD        ondansetron (ZOFRAN) injection 4 mg  4 mg Intravenous Q6H PRN Garrett Alicia MD   4 mg at 11/23/24 1118    oxyCODONE-acetaminophen (PERCOCET) 5-325 MG per tablet 1 tablet  1 tablet Oral Q4H PRN Telly Rodriguez MD   1 tablet at 11/25/24 0830    Phosphorus Replacement - Follow Nurse / BPA Driven Protocol   Not Applicable PRN Telly Rodriguez MD        Potassium Replacement - Follow Nurse / BPA Driven Protocol   Not Applicable PRN Telly Rodriguez MD        pregabalin (LYRICA) capsule 75 mg  75 mg Oral Nightly Telly Rodriguez MD   75 mg at 11/24/24 2017    prochlorperazine (COMPAZINE) injection 5 mg  5 mg Intravenous Q6H PRN Telly Rodriguez MD   5 mg at 11/20/24 1456    rosuvastatin (CRESTOR) tablet 10 mg  10 mg Oral Daily Garrett Alicia MD   10 mg at 11/25/24 0823    sennosides-docusate (PERICOLACE) 8.6-50 MG per tablet 1 tablet  1 tablet Oral Nightly Garrett Alicia MD   1 tablet at 11/24/24 2016    sodium chloride 0.9 % flush 10 mL  10 mL Intravenous Q12H Garrett Alicia MD   10 mL at 11/25/24 0824    sodium chloride 0.9 % flush 10 mL  10 mL Intravenous PRN Garrett Alicia MD        sodium chloride 0.9 % infusion 40 mL  40 mL Intravenous PRN Garrett Alicia MD        tamsulosin (FLOMAX) 24 hr capsule 0.4 mg  0.4 mg Oral Daily Garrett Alicia MD   0.4 mg at 11/25/24 0822    [Held by provider] zinc sulfate (ZINCATE) capsule 220 mg  220 mg Oral Daily Telly Rodriguez MD   220 mg at 11/20/24 0836       Past Medical History:  Past Medical History:   Diagnosis Date    Arthritis     Autonomic disease     CAD (coronary artery disease) 02/06/2017    Cervical radiculopathy 09/16/2021    Chronic  constipation with acute exaccerbation 05/10/2021    Coronary artery disease     Degeneration of cervical intervertebral disc 08/11/2021    Diabetes mellitus     Diabetic foot ulcer 08/31/2020    Diabetic polyneuropathy associated with type 2 diabetes mellitus 01/18/2021    Elevated cholesterol     Gastroesophageal reflux disease 05/13/2019    Headache     HTN (hypertension), benign 05/03/2017    Hyperlipidemia     Hypertension     Mixed hyperlipidemia 02/07/2017    Multiple lung nodules 01/26/2020    5mm, 9 mm RLL identified 1/2020, not present 10/2019.    Myocardial infarction     Osteomyelitis 01/22/2020    Osteomyelitis of fifth toe of right foot 10/07/2019    Pancreatitis     Persistent insomnia 01/20/2020    Renal disorder     Sleep apnea 02/06/2017    Sleep apnea with use of continuous positive airway pressure (CPAP)     NON-COMPLIANT    Slow transit constipation 01/16/2019    Spinal stenosis in cervical region 09/16/2021    Vitamin D deficiency 03/02/2021       Past Surgical History:  Past Surgical History:   Procedure Laterality Date    ABDOMINAL SURGERY      AMPUTATION FOOT / TOE Left 10/2021    5th digit     ANTERIOR CERVICAL DISCECTOMY W/ FUSION N/A 08/05/2022    Procedure: CERVICAL DISCECTOMY ANTERIOR WITH FUSION C5-6 with possible plating of C5-7 with neuromonitoring and 1 c-arm;  Surgeon: Karel Soliz MD;  Location:  [facility] OR;  Service: Neurosurgery;  Laterality: N/A;    APPENDECTOMY      BACK SURGERY      CARDIAC CATHETERIZATION Left 02/08/2021    Procedure: Left Heart Cath w poss intervention left anatomical snuff box acess;  Surgeon: Omkar Charles DO;  Location:  [facility] CATH INVASIVE LOCATION;  Service: Cardiology;  Laterality: Left;    CARDIAC SURGERY      CATARACT EXTRACTION      CERVICAL SPINE SURGERY      COLONOSCOPY N/A 01/31/2017    Normal exam repeat in 5 years    COLONOSCOPY N/A 02/11/2019    Mild acute inflammation    COLONOSCOPY N/A 04/07/2024    2 areas at 10 and 20 cm  with friability ulceration 2 clips placed at 20 cm and 4 clips at 10 cm poor prep normal mucosa,mild eroisions and ulcerations in visible vessels    COLONOSCOPY N/A 7/1/2024    Procedure: COLONOSCOPY WITH ANESTHESIA;  Surgeon: Arsalan Lorenzo DO;  Location: [facility] ENDOSCOPY;  Service: Gastroenterology;  Laterality: N/A;  pre op constipation/diarrhea  post poor prep  pcp Del Shetty    COLONOSCOPY N/A 7/2/2024    Procedure: COLONOSCOPY WITH ANESTHESIA;  Surgeon: Agapito Christopher MD;  Location: [facility] ENDOSCOPY;  Service: Gastroenterology;  Laterality: N/A;  pre rectal bleeding  post poor prep  pcp Del Shetty MD    COLONOSCOPY W/ POLYPECTOMY  03/04/2014    Hyperplastic polyp    CORONARY ARTERY BYPASS GRAFT  10/2015    ENDOSCOPY  04/13/2011    Gastritis with hemorrhage    ENDOSCOPY N/A 05/05/2017    Normal exam    ENDOSCOPY N/A 02/11/2019    Gastritis    ENDOSCOPY N/A 09/01/2020    Non-erosive gastritis with hemorrhage    ENDOSCOPY N/A 02/10/2021    Esophagitis    ENDOSCOPY N/A 04/11/2024    There were esophageal mucosal changes suspicious for short-segment Low's esophagus present in the distal esophagus. The maximum longitudinal extent of these mucosal changes was 2 cm in length. Mucosa was biopsied with a cold forceps for histologyDistal esophagus, biopsies: Mild chronic active esophagogastritis. No evidence of intestinal metaplasia, dysplasia. Antrum, bx, Mild chronic gastritis    FOOT SURGERY Left     INCISION AND DRAINAGE OF WOUND Left 09/2007    spider bite       Family History  Family History   Problem Relation Age of Onset    Colon cancer Father     Heart disease Father     Colon cancer Sister     Colon polyps Sister     Alzheimer's disease Mother     Coronary artery disease Sister     Coronary artery disease Sister        Social History  Social History     Socioeconomic History    Marital status:    Tobacco Use    Smoking status: Former     Current packs/day: 0.00     Types: Cigarettes      Quit date:      Years since quittin.9    Smokeless tobacco: Never    Tobacco comments:     smoked in highschool   Vaping Use    Vaping status: Never Used   Substance and Sexual Activity    Alcohol use: No    Drug use: No    Sexual activity: Defer       Review of Systems:  History obtained from chart review and the patient  General ROS: No fever or chills  Respiratory ROS: No cough, shortness of breath, wheezing  Cardiovascular ROS: No chest pain or palpitations  Gastrointestinal ROS: No abdominal pain or melena  Genito-Urinary ROS: No dysuria or hematuria  Psych ROS: No anxiety and depression  14 point ROS reviewed with the patient and negative except as noted above and in the HPI unless unable to obtain.    Objective:  Patient Vitals for the past 24 hrs:   BP Temp Temp src Pulse Resp SpO2   24 0723 124/64 98.2 °F (36.8 °C) Oral 69 16 94 %   24 0311 128/68 98 °F (36.7 °C) Oral 73 16 98 %   24 1923 115/56 97.8 °F (36.6 °C) Oral 76 16 98 %   24 1507 127/71 97.8 °F (36.6 °C) Oral 68 16 99 %   24 1102 128/60 97.8 °F (36.6 °C) Oral 63 16 96 %       Intake/Output Summary (Last 24 hours) at 2024 0956  Last data filed at 2024 0400  Gross per 24 hour   Intake 240 ml   Output 950 ml   Net -710 ml     General: awake/alert   Chest:  clear to auscultation bilaterally without respiratory distress  CVS: regular rate and rhythm  Abdominal: soft, nontender, positive bowel sounds  Extremities: no cyanosis or edema  Skin: warm and dry without rash      Labs:  Results from last 7 days   Lab Units 24  0545 24  0643 24  0604   WBC 10*3/mm3 5.45 5.24 6.24   HEMOGLOBIN g/dL 9.4* 9.7* 10.8*   HEMATOCRIT % 29.2* 29.1* 34.0*   PLATELETS 10*3/mm3 233 239 256         Results from last 7 days   Lab Units 24  0545 24  0643 24  0604   SODIUM mmol/L 138 139 140   POTASSIUM mmol/L 4.4 4.1 4.4   CHLORIDE mmol/L 107 106 107   CO2 mmol/L 21.0* 21.0* 21.0*   BUN  mg/dL 46* 45* 52*   CREATININE mg/dL 2.55* 2.38* 2.72*   CALCIUM mg/dL 8.1* 8.1* 8.3*   EGFR mL/min/1.73 26.7* 29.0* 24.7*   GLUCOSE mg/dL 196* 200* 144*       Radiology:   Imaging Results (Last 72 Hours)       ** No results found for the last 72 hours. **            Culture:  Blood Culture   Date Value Ref Range Status   11/18/2024 No growth at 3 days  Preliminary   11/17/2024 No growth at 3 days  Preliminary     Urine Culture   Date Value Ref Range Status   11/17/2024 >100,000 CFU/mL Escherichia coli (A)  Preliminary         Assessment    Acute kidney injury  Baseline chronic kidney disease stage 3b  Hypertension  Uncontrolled type 2 diabetes (A1c 13.4%)  Anemia of CKD  Metabolic acidosis  Acute cystitis  Obesity     Plan:  Renal function slightly lower today. Continue to encourage adequate PO fluid intake  Monitor labs      Stewart Alvarez, SUSAN  11/25/2024  09:56 CST

## 2024-11-25 NOTE — PLAN OF CARE
Goal Outcome Evaluation:  Plan of Care Reviewed With: patient           Outcome Evaluation: A&OX4, vitals stable. C/o chronic back and foot pain, see MAR. accuchmaria luisa, refusing additinal 10 units of humalog with meals, pt ok with recieving SSI. chronic wounds to left foot and left arm, drsg CDI.  BUN and Cr trending down, encouarging PO intake. contact precautions maintained, Safety maintained.

## 2024-11-25 NOTE — PROGRESS NOTES
[facility] Medicine Services  INPATIENT PROGRESS NOTE    Patient Name: Erick Luong  Date of Admission: 11/17/2024  Today's Date: 11/25/24  Length of Stay: 6  Primary Care Physician: Del Shetty MD    Subjective   Chief Complaint: Ams/Failure to thrive    No acute events overnight. He does not have any complaints at this time.     Review of Systems   All pertinent negatives and positives are as above. All other systems have been reviewed and are negative unless otherwise stated.     Objective    Temp:  [97.8 °F (36.6 °C)-98.2 °F (36.8 °C)] 98.2 °F (36.8 °C)  Heart Rate:  [63-76] 69  Resp:  [16] 16  BP: (115-128)/(56-71) 124/64  Physical Exam  GEN: Awake, alert, interactive, in NAD, obese  HEENT: Atraumatic,  EOMI, Anicteric,   Lungs: CTAB, no wheezing/rales/rhonchi  Heart: RRR, +S1/s2, no rub  ABD: soft, suprapubic tenderness, +BS, no guarding/rebound  Extremities: atraumatic, no cyanosis, no edema  Skin: bilateral wounds on feet  Neuro: AAOx3, no focal deficits  Psych: normal mood & affect        Results Review:  I have reviewed the labs, radiology results, and diagnostic studies.    Laboratory Data:   Results from last 7 days   Lab Units 11/25/24  0545 11/24/24  0643 11/23/24  0604   WBC 10*3/mm3 5.45 5.24 6.24   HEMOGLOBIN g/dL 9.4* 9.7* 10.8*   HEMATOCRIT % 29.2* 29.1* 34.0*   PLATELETS 10*3/mm3 233 239 256        Results from last 7 days   Lab Units 11/25/24  0545 11/24/24  0643 11/23/24  0604   SODIUM mmol/L 138 139 140   POTASSIUM mmol/L 4.4 4.1 4.4   CHLORIDE mmol/L 107 106 107   CO2 mmol/L 21.0* 21.0* 21.0*   BUN mg/dL 46* 45* 52*   CREATININE mg/dL 2.55* 2.38* 2.72*   CALCIUM mg/dL 8.1* 8.1* 8.3*   GLUCOSE mg/dL 196* 200* 144*       Culture Data:   [unfilled]    Radiology Data:   Imaging Results (Last 24 Hours)       ** No results found for the last 24 hours. **            I have reviewed the patient's current medications.     Assessment/Plan    Assessment  Active Hospital Problems    Diagnosis     **Acute UTI (urinary tract infection)     Acute encephalopathy     Type 2 diabetes mellitus with hyperglycemia, with long-term current use of insulin     Atrial fibrillation     Chronic heart failure with preserved ejection fraction (HFpEF)     Stage 3b chronic kidney disease     CAD (coronary artery disease)     BPH without obstruction/lower urinary tract symptoms     Diabetic ulcer of left foot associated with type 2 diabetes mellitus     Sleep apnea with use of continuous positive airway pressure (CPAP)     Essential hypertension      Erick Luong is a 69 y.o. male with past medical history of coronary artery disease, CABG, Afib, diabetes mellitus insulin-dependent, diabetic foot ulcer, hypertension, sleep apnea noncompliant with CPAP, right seventh rib fracture on 10/20/2024, presents emergency room with complaints of mental status changes disorientation suprapubic pain and burning with urination.     Treatment Plan:    # ESBL E. coli UTI  Day 7 of ertapenem today -will complete 10 days given persistent suprapubic tenderness and dysuria    # WANDER on CKD 3b  Cr worse today 2.55 from 2.38  - Nephrology following  --recommend encouraging p.o. intake  - Bumex on hold per nephro    # Wounds on Bilateral Feet  - wound care consult     # Hypertension - continue lisinopril  # Hyperlipidemia - continue crestor  # Diabetes mellitus - continue lantus 20, lispro 10/10/10, and SSI - pregabalin and cymbalta  # A-fib - continue Eliquis  # Constipation - docusate and pericolace  # BPH - continue home flomax         Medical Decision Making  Number and Complexity of problems: 1 acute, 1 acute on chronic, others chronic  Differential Diagnosis: As above    Conditions and Status        New to me but stable.       MDM Data  External documents reviewed: Reviewed  Cardiac tracing (EKG, telemetry) interpretation: Reviewed  Radiology interpretation: Reviewed  Labs reviewed: As  above  Any tests that were considered but not ordered: None     Decision rules/scores evaluated (example [id number], Wells, etc): None     Discussed with: Patient     Care Planning  Shared decision making: Discussed with patient  Code status and discussions: Full code    Disposition  Social Determinants of Health that impact treatment or disposition: None  I expect the patient to be discharged to home in TBD days.         Electronically signed by Germania Sheridan MD, 11/25/24, 09:04 CST.

## 2024-11-26 LAB
ANION GAP SERPL CALCULATED.3IONS-SCNC: 9 MMOL/L (ref 5–15)
B PARAPERT DNA SPEC QL NAA+PROBE: NOT DETECTED
B PERT DNA SPEC QL NAA+PROBE: NOT DETECTED
BUN SERPL-MCNC: 46 MG/DL (ref 8–23)
BUN/CREAT SERPL: 21.4 (ref 7–25)
C PNEUM DNA NPH QL NAA+NON-PROBE: NOT DETECTED
CALCIUM SPEC-SCNC: 8.1 MG/DL (ref 8.6–10.5)
CHLORIDE SERPL-SCNC: 107 MMOL/L (ref 98–107)
CO2 SERPL-SCNC: 22 MMOL/L (ref 22–29)
CREAT SERPL-MCNC: 2.15 MG/DL (ref 0.76–1.27)
DEPRECATED RDW RBC AUTO: 52.1 FL (ref 37–54)
EGFRCR SERPLBLD CKD-EPI 2021: 32.7 ML/MIN/1.73
ERYTHROCYTE [DISTWIDTH] IN BLOOD BY AUTOMATED COUNT: 15.8 % (ref 12.3–15.4)
FLUAV SUBTYP SPEC NAA+PROBE: NOT DETECTED
FLUBV RNA ISLT QL NAA+PROBE: NOT DETECTED
GLUCOSE BLDC GLUCOMTR-MCNC: 113 MG/DL (ref 70–130)
GLUCOSE BLDC GLUCOMTR-MCNC: 151 MG/DL (ref 70–130)
GLUCOSE BLDC GLUCOMTR-MCNC: 167 MG/DL (ref 70–130)
GLUCOSE BLDC GLUCOMTR-MCNC: 236 MG/DL (ref 70–130)
GLUCOSE SERPL-MCNC: 131 MG/DL (ref 65–99)
HADV DNA SPEC NAA+PROBE: NOT DETECTED
HCOV 229E RNA SPEC QL NAA+PROBE: NOT DETECTED
HCOV HKU1 RNA SPEC QL NAA+PROBE: NOT DETECTED
HCOV NL63 RNA SPEC QL NAA+PROBE: NOT DETECTED
HCOV OC43 RNA SPEC QL NAA+PROBE: NOT DETECTED
HCT VFR BLD AUTO: 31.5 % (ref 37.5–51)
HGB BLD-MCNC: 9.9 G/DL (ref 13–17.7)
HMPV RNA NPH QL NAA+NON-PROBE: NOT DETECTED
HPIV1 RNA ISLT QL NAA+PROBE: NOT DETECTED
HPIV2 RNA SPEC QL NAA+PROBE: NOT DETECTED
HPIV3 RNA NPH QL NAA+PROBE: NOT DETECTED
HPIV4 P GENE NPH QL NAA+PROBE: NOT DETECTED
M PNEUMO IGG SER IA-ACNC: NOT DETECTED
MCH RBC QN AUTO: 28.1 PG (ref 26.6–33)
MCHC RBC AUTO-ENTMCNC: 31.4 G/DL (ref 31.5–35.7)
MCV RBC AUTO: 89.5 FL (ref 79–97)
PLATELET # BLD AUTO: 244 10*3/MM3 (ref 140–450)
PMV BLD AUTO: 11.8 FL (ref 6–12)
POTASSIUM SERPL-SCNC: 4.2 MMOL/L (ref 3.5–5.2)
RBC # BLD AUTO: 3.52 10*6/MM3 (ref 4.14–5.8)
RHINOVIRUS RNA SPEC NAA+PROBE: NOT DETECTED
RSV RNA NPH QL NAA+NON-PROBE: NOT DETECTED
SARS-COV-2 RNA NPH QL NAA+NON-PROBE: NOT DETECTED
SODIUM SERPL-SCNC: 138 MMOL/L (ref 136–145)
WBC NRBC COR # BLD AUTO: 5.93 10*3/MM3 (ref 3.4–10.8)

## 2024-11-26 PROCEDURE — 80048 BASIC METABOLIC PNL TOTAL CA: CPT | Performed by: FAMILY MEDICINE

## 2024-11-26 PROCEDURE — 97116 GAIT TRAINING THERAPY: CPT

## 2024-11-26 PROCEDURE — 97110 THERAPEUTIC EXERCISES: CPT

## 2024-11-26 PROCEDURE — 82948 REAGENT STRIP/BLOOD GLUCOSE: CPT

## 2024-11-26 PROCEDURE — 25010000002 ERTAPENEM PER 500 MG: Performed by: STUDENT IN AN ORGANIZED HEALTH CARE EDUCATION/TRAINING PROGRAM

## 2024-11-26 PROCEDURE — 97535 SELF CARE MNGMENT TRAINING: CPT

## 2024-11-26 PROCEDURE — 85027 COMPLETE CBC AUTOMATED: CPT | Performed by: FAMILY MEDICINE

## 2024-11-26 PROCEDURE — 0202U NFCT DS 22 TRGT SARS-COV-2: CPT | Performed by: STUDENT IN AN ORGANIZED HEALTH CARE EDUCATION/TRAINING PROGRAM

## 2024-11-26 PROCEDURE — 25010000002 ONDANSETRON PER 1 MG: Performed by: FAMILY MEDICINE

## 2024-11-26 PROCEDURE — 63710000001 INSULIN LISPRO (HUMAN) PER 5 UNITS: Performed by: FAMILY MEDICINE

## 2024-11-26 RX ADMIN — DOCUSATE SODIUM 100 MG: 100 CAPSULE, LIQUID FILLED ORAL at 08:32

## 2024-11-26 RX ADMIN — INSULIN LISPRO 4 UNITS: 100 INJECTION, SOLUTION INTRAVENOUS; SUBCUTANEOUS at 20:59

## 2024-11-26 RX ADMIN — Medication 10 ML: at 20:54

## 2024-11-26 RX ADMIN — ONDANSETRON 4 MG: 2 INJECTION INTRAMUSCULAR; INTRAVENOUS at 17:33

## 2024-11-26 RX ADMIN — OXYCODONE HYDROCHLORIDE AND ACETAMINOPHEN 1 TABLET: 5; 325 TABLET ORAL at 08:32

## 2024-11-26 RX ADMIN — PREGABALIN 75 MG: 75 CAPSULE ORAL at 20:53

## 2024-11-26 RX ADMIN — POLYETHYLENE GLYCOL 3350 17 G: 17 POWDER, FOR SOLUTION ORAL at 08:31

## 2024-11-26 RX ADMIN — APIXABAN 5 MG: 5 TABLET, FILM COATED ORAL at 08:31

## 2024-11-26 RX ADMIN — Medication 1 TABLET: at 08:32

## 2024-11-26 RX ADMIN — OXYCODONE HYDROCHLORIDE AND ACETAMINOPHEN 1 TABLET: 5; 325 TABLET ORAL at 01:07

## 2024-11-26 RX ADMIN — DOCUSATE SODIUM 50 MG AND SENNOSIDES 8.6 MG 1 TABLET: 8.6; 5 TABLET, FILM COATED ORAL at 20:53

## 2024-11-26 RX ADMIN — APIXABAN 5 MG: 5 TABLET, FILM COATED ORAL at 20:54

## 2024-11-26 RX ADMIN — LISINOPRIL 5 MG: 10 TABLET ORAL at 08:31

## 2024-11-26 RX ADMIN — DULOXETINE HYDROCHLORIDE 30 MG: 30 CAPSULE, DELAYED RELEASE ORAL at 08:32

## 2024-11-26 RX ADMIN — Medication 10 ML: at 08:32

## 2024-11-26 RX ADMIN — OXYCODONE HYDROCHLORIDE AND ACETAMINOPHEN 1 TABLET: 5; 325 TABLET ORAL at 17:32

## 2024-11-26 RX ADMIN — ROSUVASTATIN 10 MG: 10 TABLET, FILM COATED ORAL at 08:32

## 2024-11-26 RX ADMIN — DOCUSATE SODIUM 100 MG: 100 CAPSULE, LIQUID FILLED ORAL at 20:54

## 2024-11-26 RX ADMIN — TAMSULOSIN HYDROCHLORIDE 0.4 MG: 0.4 CAPSULE ORAL at 08:32

## 2024-11-26 RX ADMIN — INSULIN LISPRO 2 UNITS: 100 INJECTION, SOLUTION INTRAVENOUS; SUBCUTANEOUS at 12:32

## 2024-11-26 RX ADMIN — ERTAPENEM 1000 MG: 1 INJECTION INTRAMUSCULAR; INTRAVENOUS at 20:53

## 2024-11-26 RX ADMIN — FAMOTIDINE 20 MG: 10 INJECTION INTRAVENOUS at 08:31

## 2024-11-26 NOTE — THERAPY DISCHARGE NOTE
Acute Care - Occupational Therapy Treatment Note/Discharge  [facility]     Patient Name: Erick Luong  : 1955  MRN: [id number]  Today's Date: 2024               Admit Date: 2024       ICD-10-CM ICD-9-CM   1. Acute UTI (urinary tract infection)  N39.0 599.0   2. Acute renal failure superimposed on chronic kidney disease, unspecified acute renal failure type, unspecified CKD stage  N17.9 584.9    N18.9 585.9   3. Acute hyperglycemia  R73.9 790.29   4. Impaired functional mobility and activity tolerance [Z74.09]  Z74.09 V49.89     Patient Active Problem List   Diagnosis    Obesity, unspecified obesity severity, unspecified obesity type    Essential hypertension    Type 2 diabetes mellitus with hyperglycemia, with long-term current use of insulin    Nonsmoker    Anemia due to chronic kidney disease    Class 3 severe obesity due to excess calories with body mass index (BMI) of 40.0 to 44.9 in adult    Anasarca    Sleep apnea with use of continuous positive airway pressure (CPAP)    Medically noncompliant    Diabetic ulcer of left foot associated with type 2 diabetes mellitus    Diabetic polyneuropathy associated with type 2 diabetes mellitus    Spinal stenosis in cervical region    Degeneration of cervical intervertebral disc    Cervical radiculopathy    Degeneration of lumbar or lumbosacral intervertebral disc    Cervical myelopathy    Bilateral carpal tunnel syndrome    CAD (coronary artery disease)    GERD without esophagitis    BPH without obstruction/lower urinary tract symptoms    Stage 3b chronic kidney disease    Chronic diastolic heart failure    Type 2 myocardial infarction due to heart failure    Left carpal tunnel syndrome    Syncope and collapse, recurrent episodes    Poorly-controlled hypertension    Rhinovirus    Peripheral vascular disease    Chronic kidney disease (CKD), stage IV (severe)    Diabetic foot infection    Sepsis    Epigastric pain    Chronic heart failure with preserved  ejection fraction (HFpEF)    Sepsis due to methicillin resistant Staphylococcus aureus (MRSA) with encephalopathy without septic shock    Slow transit constipation    CHF exacerbation    CHF (congestive heart failure), NYHA class I, acute on chronic, combined    Rectal bleeding    Acute on chronic heart failure with preserved ejection fraction (HFpEF)    DKA, type 2, not at goal    Atrial fibrillation    E. coli UTI, ESBL    Acute UTI (urinary tract infection)    Acute encephalopathy    Type 2 diabetes mellitus with hyperglycemia, with long-term current use of insulin     Past Medical History:   Diagnosis Date    Arthritis     Autonomic disease     CAD (coronary artery disease) 02/06/2017    Cervical radiculopathy 09/16/2021    Chronic constipation with acute exaccerbation 05/10/2021    Coronary artery disease     Degeneration of cervical intervertebral disc 08/11/2021    Diabetes mellitus     Diabetic foot ulcer 08/31/2020    Diabetic polyneuropathy associated with type 2 diabetes mellitus 01/18/2021    Elevated cholesterol     Gastroesophageal reflux disease 05/13/2019    Headache     HTN (hypertension), benign 05/03/2017    Hyperlipidemia     Hypertension     Mixed hyperlipidemia 02/07/2017    Multiple lung nodules 01/26/2020    5mm, 9 mm RLL identified 1/2020, not present 10/2019.    Myocardial infarction     Osteomyelitis 01/22/2020    Osteomyelitis of fifth toe of right foot 10/07/2019    Pancreatitis     Persistent insomnia 01/20/2020    Renal disorder     Sleep apnea 02/06/2017    Sleep apnea with use of continuous positive airway pressure (CPAP)     NON-COMPLIANT    Slow transit constipation 01/16/2019    Spinal stenosis in cervical region 09/16/2021    Vitamin D deficiency 03/02/2021     Past Surgical History:   Procedure Laterality Date    ABDOMINAL SURGERY      AMPUTATION FOOT / TOE Left 10/2021    5th digit     ANTERIOR CERVICAL DISCECTOMY W/ FUSION N/A 08/05/2022    Procedure: CERVICAL DISCECTOMY  ANTERIOR WITH FUSION C5-6 with possible plating of C5-7 with neuromonitoring and 1 c-arm;  Surgeon: Karel Soliz MD;  Location: [facility] OR;  Service: Neurosurgery;  Laterality: N/A;    APPENDECTOMY      BACK SURGERY      CARDIAC CATHETERIZATION Left 02/08/2021    Procedure: Left Heart Cath w poss intervention left anatomical snuff box acess;  Surgeon: Omkar Charles DO;  Location: [facility] CATH INVASIVE LOCATION;  Service: Cardiology;  Laterality: Left;    CARDIAC SURGERY      CATARACT EXTRACTION      CERVICAL SPINE SURGERY      COLONOSCOPY N/A 01/31/2017    Normal exam repeat in 5 years    COLONOSCOPY N/A 02/11/2019    Mild acute inflammation    COLONOSCOPY N/A 04/07/2024    2 areas at 10 and 20 cm with friability ulceration 2 clips placed at 20 cm and 4 clips at 10 cm poor prep normal mucosa,mild eroisions and ulcerations in visible vessels    COLONOSCOPY N/A 7/1/2024    Procedure: COLONOSCOPY WITH ANESTHESIA;  Surgeon: Arsalan Lorenzo DO;  Location: [facility] ENDOSCOPY;  Service: Gastroenterology;  Laterality: N/A;  pre op constipation/diarrhea  post poor prep  pcp Del Shetty    COLONOSCOPY N/A 7/2/2024    Procedure: COLONOSCOPY WITH ANESTHESIA;  Surgeon: Agapito Christopher MD;  Location: [facility] ENDOSCOPY;  Service: Gastroenterology;  Laterality: N/A;  pre rectal bleeding  post poor prep  pcp Del Shetty MD    COLONOSCOPY W/ POLYPECTOMY  03/04/2014    Hyperplastic polyp    CORONARY ARTERY BYPASS GRAFT  10/2015    ENDOSCOPY  04/13/2011    Gastritis with hemorrhage    ENDOSCOPY N/A 05/05/2017    Normal exam    ENDOSCOPY N/A 02/11/2019    Gastritis    ENDOSCOPY N/A 09/01/2020    Non-erosive gastritis with hemorrhage    ENDOSCOPY N/A 02/10/2021    Esophagitis    ENDOSCOPY N/A 04/11/2024    There were esophageal mucosal changes suspicious for short-segment Low's esophagus present in the distal esophagus. The maximum longitudinal extent of these mucosal changes was 2 cm in length. Mucosa was  "biopsied with a cold forceps for histologyDistal esophagus, biopsies: Mild chronic active esophagogastritis. No evidence of intestinal metaplasia, dysplasia. Antrum, bx, Mild chronic gastritis    FOOT SURGERY Left     INCISION AND DRAINAGE OF WOUND Left 09/2007    spider bite       OT ASSESSMENT FLOWSHEET (Last 12 Hours)       OT Evaluation and Treatment       Row Name 11/26/24 0900                   OT Time and Intention    Subjective Information complains of;pain;dizziness;nausea/vomiting  -AC (r) JR (t) AC (c)        Document Type discharge treatment  - (r) JR (t) AC (c)        Mode of Treatment occupational therapy  -AC (r) JR (t) AC (c)           General Information    Existing Precautions/Restrictions fall;non-weight bearing  NWB LLE unless wearing walking boot  -AC (r) JR (t) AC (c)        Risks Reviewed patient:;nausea/vomiting;dizziness;increased discomfort  -AC (r) JR (t) AC (c)        Benefits Reviewed patient:;improve function;increase independence;increase strength  -AC (r) JR (t) AC (c)           Pain Assessment    Pretreatment Pain Rating 8/10  -AC (r) JR (t) AC (c)        Posttreatment Pain Rating 8/10  -AC (r) JR (t) AC (c)        Pain Location --  -AC (r) JR (t) AC (c)        Pain Side/Orientation generalized  -AC (r) JR (t) AC (c)        Pain Management Interventions activity modification encouraged;exercise or physical activity utilized;positioning techniques utilized  -AC (r) JR (t) AC (c)        Response to Pain Interventions activity and movement patterns unchanged  -AC (r) JR (t) AC (c)        Pre/Posttreatment Pain Comment pt reports \"I just feel bad\"  -AC (r) JR (t) AC (c)           Cognition    Orientation Status (Cognition) oriented x 4  -AC (r) JR (t) AC (c)        Personal Safety Interventions fall prevention program maintained;gait belt;muscle strengthening facilitated;nonskid shoes/slippers when out of bed;supervised activity  -AC (r) JR (t) AC (c)           Lower Body Dressing " Assessment/Training    [place] Level (Lower Body Dressing) don;doff;maximum assist (25% patient effort)  LLE walking boot  -AC (r) JR (t) AC (c)        Position (Lower Body Dressing) edge of bed sitting;unsupported sitting  -AC (r) JR (t) AC (c)           BADL Safety/Performance    Impairments, BADL Safety/Performance endurance/activity tolerance;pain  -AC (r) JR (t) AC (c)           Mobility    Extremity Weight-bearing Status left lower extremity  -AC (r) JR (t) AC (c)        Left Lower Extremity (Weight-bearing Status) non weight-bearing (NWB)  can WB if wearing L walking boot  -AC (r) JR (t) AC (c)           Bed Mobility    Bed Mobility supine-sit  -AC (r) JR (t) AC (c)        Supine-Sit [place] (Bed Mobility) modified independence  -AC (r) JR (t) AC (c)        Assistive Device (Bed Mobility) head of bed elevated;bed rails  -AC (r) JR (t) AC (c)           Functional Mobility    Functional Mobility- Ind. Level standby assist  -AC (r) JR (t) AC (c)        Functional Mobility- Device walker, front-wheeled  -AC (r) JR (t) AC (c)        Functional Mobility- Comment room<>hallway  -AC (r) JR (t) AC (c)           Transfer Assessment/Treatment    Transfers sit-stand transfer;stand-sit transfer  -AC (r) JR (t) AC (c)           Sit-Stand Transfer    Sit-Stand [place] (Transfers) standby assist  -AC (r) JR (t) AC (c)        Assistive Device (Sit-Stand Transfers) walker, front-wheeled  -AC (r) JR (t) AC (c)           Stand-Sit Transfer    Stand-Sit [place] (Transfers) standby assist  -AC (r) JR (t) AC (c)        Assistive Device (Stand-Sit Transfers) walker, front-wheeled  -AC (r) JR (t) AC (c)           Safety Issues/Impairments Affecting Functional Mobility    Impairments Affecting Function (Mobility) endurance/activity tolerance;pain;strength  -AC (r) JR (t) AC (c)           Wound 08/22/23 0140 Left posterior plantar    Wound - Properties Group Placement Date: 08/22/23  -AM Placement Time: 0140  -AM  Side: Left  -AM Orientation: posterior  -AM Location: plantar  -AM Present on Original Admission: Y  -AM    Retired Wound - Properties Group Placement Date: 08/22/23  -AM Placement Time: 0140  -AM Present on Original Admission: Y  -AM Side: Left  -AM Orientation: posterior  -AM Location: plantar  -AM    Retired Wound - Properties Group Placement Date: 08/22/23  -AM Placement Time: 0140  -AM Present on Original Admission: Y  -AM Side: Left  -AM Orientation: posterior  -AM Location: plantar  -AM    Retired Wound - Properties Group Date first assessed: 08/22/23  -AM Time first assessed: 0140  -AM Present on Original Admission: Y  -AM Side: Left  -AM Location: plantar  -AM       Wound 06/15/23 0102 Left anterior plantar    Wound - Properties Group Placement Date: 06/15/23  -SM Placement Time: 0102  -SM Side: Left  -SM Orientation: anterior  -SM Location: plantar  -SM Removal Date: --  -JRA Removal Time: --  -JRA    Retired Wound - Properties Group Placement Date: 06/15/23  -SM Placement Time: 0102  -SM Side: Left  -SM Orientation: anterior  -SM Location: plantar  -SM Removal Date: --  -JRA Removal Time: --  -JRA    Retired Wound - Properties Group Placement Date: 06/15/23  -SM Placement Time: 0102  -SM Side: Left  -SM Orientation: anterior  -SM Location: plantar  -SM Removal Date: --  -JRA Removal Time: --  -JRA    Retired Wound - Properties Group Date first assessed: 06/15/23  -SM Time first assessed: 0102  -SM Side: Left  -SM Location: plantar  -SM Resolution Date: --  -JRA Resolution Time: --  -JRA       Wound 11/18/24 0046 Left lower arm skin tear;abrasion    Wound - Properties Group Placement Date: 11/18/24  -JRA Placement Time: 0046  -JRA Side: Left  -JRA Orientation: lower  -JRA Location: arm  -JRA Primary Wound Type: Abrasion  -JRA Type: skin tear;abrasion  -JRA Present on Original Admission: Y  -JRA    Retired Wound - Properties Group Placement Date: 11/18/24  -JRA Placement Time: 0046  -JRA Present on Original  "Admission: Y  -JRA Side: Left  -JRA Orientation: lower  -JRA Location: arm  -JRA Primary Wound Type: Abrasion  -JRA Type: skin tear;abrasion  -JRA    Retired Wound - Properties Group Placement Date: 11/18/24  -JRA Placement Time: 0046 -JRA Present on Original Admission: Y  -JRA Side: Left  -JRA Orientation: lower  -JRA Location: arm  -JRA Primary Wound Type: Abrasion  -JRA Type: skin tear;abrasion  -JRA    Retired Wound - Properties Group Date first assessed: 11/18/24  -JRA Time first assessed: 0046  -JRA Present on Original Admission: Y  -JRA Side: Left  -JRA Location: arm  -JRA Primary Wound Type: Abrasion  -JRA Type: skin tear;abrasion  -JRA       Wound 11/18/24 0056 Left medial foot diabetic ulcer    Wound - Properties Group Placement Date: 11/18/24  -JRA Placement Time: 0056 -JRA Side: Left  -JRA Orientation: medial  -JRA Location: foot  -JRA Primary Wound Type: Diabetic ulc  -JRA Type: diabetic ulcer  -JRA Present on Original Admission: Y  -JRA    Retired Wound - Properties Group Placement Date: 11/18/24  -JRA Placement Time: 0056 -JRA Present on Original Admission: Y  -JRA Side: Left  -JRA Orientation: medial  -JRA Location: foot  -JRA Primary Wound Type: Diabetic ulc  -JRA Type: diabetic ulcer  -JRA    Retired Wound - Properties Group Placement Date: 11/18/24  -JRA Placement Time: 0056 -JRA Present on Original Admission: Y  -JRA Side: Left  -JRA Orientation: medial  -JRA Location: foot  -JRA Primary Wound Type: Diabetic ulc  -JRA Type: diabetic ulcer  -JRA    Retired Wound - Properties Group Date first assessed: 11/18/24  -JRA Time first assessed: 0056  -JRA Present on Original Admission: Y  -JRA Side: Left  -JRA Location: foot  -JRA Primary Wound Type: Diabetic ulc  -JRA Type: diabetic ulcer  -JRA       Plan of Care Review    Plan of Care Reviewed With patient  -AC (r) JR (t) AC (c)        Progress no change  -AC (r) JR (t) AC (c)        Outcome Evaluation OT completed treatment. Pt reports \"I just feel " "bad.\" He completed supine to sit with Amanda, using HOB elevated and bed rails. Pt required maxA to don/doff L walking boot. He expressed that his wife will be helping him when he is at home. Pt ambulated into hallway with SBA and fww. He declined any other activity due to pain and nausea. Treatment ended with pt reclined in chair. He states that he does not need OT coming back and that he can complete all of his ADLs independently. Discharge home with assist when medically stable. OT to sign off.  -AC (r) JR (t) AC (c)           Positioning and Restraints    Pre-Treatment Position in bed  -AC (r) JR (t) AC (c)        Post Treatment Position chair  -AC (r) JR (t) AC (c)        In Chair reclined;call light within reach;encouraged to call for assist;legs elevated  -AC (r) JR (t) AC (c)           Therapy Plan Review/Discharge Plan (OT)    Anticipated Discharge Disposition (OT) home with assist  -AC (r) JR (t) AC (c)           Transfer Goal 1 (OT)    Activity/Assistive Device (Transfer Goal 1, OT) toilet;tub  -AC (r) JR (t) AC (c)        [place] Level/Cues Needed (Transfer Goal 1, OT) supervision required  -AC (r) JR (t) AC (c)        Time Frame (Transfer Goal 1, OT) long term goal (LTG);10 days  -AC (r) JR (t) AC (c)        Progress/Outcome (Transfer Goal 1, OT) goal not met  -AC (r) JR (t) AC (c)           Dressing Goal 1 (OT)    Activity/Device (Dressing Goal 1, OT) dressing skills, all  -AC (r) JR (t) AC (c)        [place]/Cues Needed (Dressing Goal 1, OT) supervision required  -AC (r) JR (t) AC (c)        Time Frame (Dressing Goal 1, OT) long term goal (LTG);10 days  -AC (r) JR (t) AC (c)        Progress/Outcome (Dressing Goal 1, OT) goal not met  -AC (r) JR (t) AC (c)           Toileting Goal 1 (OT)    Activity/Device (Toileting Goal 1, OT) toileting skills, all  -AC (r) JR (t) AC (c)        [place] Level/Cues Needed (Toileting Goal 1, OT) supervision required  -ROLA spears)  (t) ROLA (c)        Time Frame " (Toileting Goal 1, OT) long term goal (LTG);10 days  -AC (r) JR (t) AC (c)        Progress/Outcome (Toileting Goal 1, OT) goal not met  -AC (r) JR (t) AC (c)           Problem Specific Goal 1 (OT)    Problem Specific Goal 1 (OT) Pt will independently implement one pain management technique to decrease pain and improve functional adl performance.  -AC (r) JR (t) AC (c)        Time Frame (Problem Specific Goal 1, OT) long term goal (LTG);by discharge  -AC (r) JR (t) AC (c)        Progress/Outcome (Problem Specific Goal 1, OT) goal not met  -AC (r) JR (t) AC (c)                  User Key  (r) = Recorded By, (t) = Taken By, (c) = Cosigned By      Initials Name Effective Dates    AC Ezekiel Blake, OTR/L, CNT 02/03/23 -     Faviola Kunz RN 06/16/21 -     Michelle Diaz RN 09/22/22 -     Sabas Coyle LPNA 05/22/23 - 06/27/23    Sabas Coyle RN 09/26/24 -     Fifi Wren, JEN Student 09/11/24 -                     Occupational Therapy Education       Title: PT OT SLP Therapies (In Progress)       Topic: Occupational Therapy (In Progress)       Point: ADL training (Done)       Description:   Instruct learner(s) on proper safety adaptation and remediation techniques during self care or transfers.   Instruct in proper use of assistive devices.                  Learning Progress Summary            Patient Acceptance, E,D, VU,DU by  at 11/26/2024 0924    Comment: OT role, safe transfer techniques, OT POC    Acceptance, E, VU by SAVI at 11/22/2024 1345    Acceptance, E, VU by SAVI at 11/21/2024 1202    Acceptance, E, VU,NR by BLAYNE at 11/20/2024 0825   Family Acceptance, E, VU by  at 11/22/2024 1345                      Point: Home exercise program (Not Started)       Description:   Instruct learner(s) on appropriate technique for monitoring, assisting and/or progressing therapeutic exercises/activities.                  Learner Progress:  Not documented in this visit.              Point:  "Precautions (Done)       Description:   Instruct learner(s) on prescribed precautions during self-care and functional transfers.                  Learning Progress Summary            Patient Acceptance, E,D, VU,DU by  at 11/26/2024 0924    Comment: OT role, safe transfer techniques, OT POC    Acceptance, E, VU by  at 11/22/2024 1345    Acceptance, E, VU by  at 11/21/2024 1202    Acceptance, E, VU,NR by  at 11/20/2024 0825   Family Acceptance, E, VU by  at 11/22/2024 1345                      Point: Body mechanics (Done)       Description:   Instruct learner(s) on proper positioning and spine alignment during self-care, functional mobility activities and/or exercises.                  Learning Progress Summary            Patient Acceptance, E,D, VU,DU by  at 11/26/2024 0924    Comment: OT role, safe transfer techniques, OT POC    Acceptance, E, VU by  at 11/22/2024 1345    Acceptance, E, VU by  at 11/21/2024 1202    Acceptance, E, VU,NR by  at 11/20/2024 0825   Family Acceptance, E, VU by  at 11/22/2024 1345                                      User Key       Initials Effective Dates Name Provider Type Discipline     06/20/22 -  Alesha Barrett, OTR/L Occupational Therapist OT     09/11/24 -  Fifi Whiting OT Student OT Student OT     10/08/24 -  America Wright OTR/L Occupational Therapist OT                    OT Recommendation and Plan     Plan of Care Review  Plan of Care Reviewed With: patient  Progress: no change  Outcome Evaluation: OT completed treatment. Pt reports \"I just feel bad.\" He completed supine to sit with Amanda, using HOB elevated and bed rails. Pt required maxA to don/doff L walking boot. He expressed that his wife will be helping him when he is at home. Pt ambulated into hallway with SBA and fww. He declined any other activity due to pain and nausea. Treatment ended with pt reclined in chair. He states that he does not need OT coming back and that he can complete all of his " "ADLs independently. Discharge home with assist when medically stable. OT to sign off.  Plan of Care Reviewed With: patient  Outcome Evaluation: OT completed treatment. Pt reports \"I just feel bad.\" He completed supine to sit with Amanda, using HOB elevated and bed rails. Pt required maxA to don/doff L walking boot. He expressed that his wife will be helping him when he is at home. Pt ambulated into hallway with SBA and fww. He declined any other activity due to pain and nausea. Treatment ended with pt reclined in chair. He states that he does not need OT coming back and that he can complete all of his ADLs independently. Discharge home with assist when medically stable. OT to sign off.      OT Rehab Goals       Row Name 11/26/24 0900             Transfer Goal 1 (OT)    Activity/Assistive Device (Transfer Goal 1, OT) toilet;tub  -AC (r) JR (t) AC (c)      [place] Level/Cues Needed (Transfer Goal 1, OT) supervision required  -AC (r) JR (t) AC (c)      Time Frame (Transfer Goal 1, OT) long term goal (LTG);10 days  -AC (r) JR (t) AC (c)      Progress/Outcome (Transfer Goal 1, OT) goal not met  -AC (r) JR (t) AC (c)         Dressing Goal 1 (OT)    Activity/Device (Dressing Goal 1, OT) dressing skills, all  -AC (r) JR (t) AC (c)      [place]/Cues Needed (Dressing Goal 1, OT) supervision required  -AC (r) JR (t) AC (c)      Time Frame (Dressing Goal 1, OT) long term goal (LTG);10 days  -AC (r) JR (t) AC (c)      Progress/Outcome (Dressing Goal 1, OT) goal not met  -AC (r) JR (t) AC (c)         Toileting Goal 1 (OT)    Activity/Device (Toileting Goal 1, OT) toileting skills, all  -AC (r) JR (t) AC (c)      [place] Level/Cues Needed (Toileting Goal 1, OT) supervision required  -AC (r) JR (t) AC (c)      Time Frame (Toileting Goal 1, OT) long term goal (LTG);10 days  -AC (r) JR (t) AC (c)      Progress/Outcome (Toileting Goal 1, OT) goal not met  -AC (r)  (t) AC (c)         Problem Specific Goal 1 (OT)    " Problem Specific Goal 1 (OT) Pt will independently implement one pain management technique to decrease pain and improve functional adl performance.  -AC (r) JR (t) AC (c)      Time Frame (Problem Specific Goal 1, OT) long term goal (LTG);by discharge  -AC (r) JR (t) AC (c)      Progress/Outcome (Problem Specific Goal 1, OT) goal not met  -AC (r) JR (t) AC (c)                User Key  (r) = Recorded By, (t) = Taken By, (c) = Cosigned By      Initials Name Provider Type Discipline    Ezekiel Ponce, OTR/L, CNT Occupational Therapist OT    Fifi Wren, OT Student OT Student OT                     Outcome Measures       Row Name 11/26/24 0900 11/24/24 5407          How much help from another person do you currently need...    Turning from your back to your side while in flat bed without using bedrails? -- 4  -TB     Moving from lying on back to sitting on the side of a flat bed without bedrails? -- 4  -TB     Moving to and from a bed to a chair (including a wheelchair)? -- 4  -TB     Standing up from a chair using your arms (e.g., wheelchair, bedside chair)? -- 4  -TB     Climbing 3-5 steps with a railing? -- 3  -TB     To walk in hospital room? -- 4  -TB     AM-PAC 6 Clicks Score (PT) -- 23  -TB        How much help from another is currently needed...    Putting on and taking off regular lower body clothing? 2  -AC (r) JR (t) AC (c) --     Bathing (including washing, rinsing, and drying) 3  -AC (r) JR (t) AC (c) --     Toileting (which includes using toilet bed pan or urinal) 3  -AC (r) JR (t) AC (c) --     Putting on and taking off regular upper body clothing 4  -AC (r) JR (t) AC (c) --     Taking care of personal grooming (such as brushing teeth) 4  -AC (r) JR (t) AC (c) --     Eating meals 4  -AC (r) JR (t) AC (c) --     AM-PAC 6 Clicks Score (OT) 20  -AC (r) JR (t) --        Functional Assessment    Outcome Measure Options AM-PAC 6 Clicks Daily Activity (OT)  -AC (r)  (t) AC (c) AM-PAC 6 Clicks Basic  Mobility (PT)  -TB               User Key  (r) = Recorded By, (t) = Taken By, (c) = Cosigned By      Initials Name Provider Type    AC Ezekiel Blake, OTR/L, CNT Occupational Therapist    TB Wendy Richards, PTA Physical Therapist Assistant    Fifi Wren, OT Student OT Student                    Time Calculation:    Time Calculation- OT       Row Name 11/26/24 0924             Time Calculation- OT    OT Start Time 0900  -AC (r) JR (t) AC (c)      OT Stop Time 0915  -AC (r) JR (t) AC (c)      OT Time Calculation (min) 15 min  -AC (r) JR (t)      Total Timed Code Minutes- OT 15 minute(s)  -AC (r) JR (t) AC (c)      OT Received On 11/26/24  -AC (r) JR (t) AC (c)         Timed Charges    44656 - OT Self Care/Mgmt Minutes 15  -AC (r) JR (t) AC (c)         Total Minutes    Timed Charges Total Minutes 15  -AC (r) JR (t)       Total Minutes 15  -AC (r) JR (t)                User Key  (r) = Recorded By, (t) = Taken By, (c) = Cosigned By      Initials Name Provider Type    Ezekiel Ponce, OTR/L, KEN Occupational Therapist    Fifi Wren, OT Student OT Student                  Timed Therapy Charges  Total Units: 1      Suggested Charges  Total Units: 1      Procedure Name Documented Minutes Units Code    HC OT SELF CARE/MGMT/TRAIN EA 15 MIN 15 1   35919 (CPT®)                 Documented Minutes  Total Minutes: 15      Therapy Provided Minutes    38744 - OT Self Care/Mgmt Minutes 15                           OT Discharge Summary  Anticipated Discharge Disposition (OT): home with assist  Reason for Discharge: At baseline function, Patient/Caregiver request  Outcomes Achieved: Refer to plan of care for updates on goals achieved  Discharge Destination: Home with assist    JEN Roach  11/26/2024

## 2024-11-26 NOTE — PLAN OF CARE
Goal Outcome Evaluation:  Plan of Care Reviewed With: patient, other (see comments)        Progress: no change  Outcome Evaluation: Nutrition follow up. Pt cont on a C.CHO diet. He has consumed 0% of breakfast and lunch today. He did consume 100% of lunch yesterday and 75% avg of 3 meals the day before. One of those meals included [employer] and donuts. Nephrology reports renal function is starting to show some improvement. Will cont to follow and encourage intake.

## 2024-11-26 NOTE — THERAPY TREATMENT NOTE
Acute Care - Physical Therapy Treatment Note  [facility]     Patient Name: Erick Luong  : 1955  MRN: [id number]  Today's Date: 2024      Visit Dx:     ICD-10-CM ICD-9-CM   1. Acute UTI (urinary tract infection)  N39.0 599.0   2. Acute renal failure superimposed on chronic kidney disease, unspecified acute renal failure type, unspecified CKD stage  N17.9 584.9    N18.9 585.9   3. Acute hyperglycemia  R73.9 790.29   4. Impaired functional mobility and activity tolerance [Z74.09]  Z74.09 V49.89     Patient Active Problem List   Diagnosis    Obesity, unspecified obesity severity, unspecified obesity type    Essential hypertension    Type 2 diabetes mellitus with hyperglycemia, with long-term current use of insulin    Nonsmoker    Anemia due to chronic kidney disease    Class 3 severe obesity due to excess calories with body mass index (BMI) of 40.0 to 44.9 in adult    Anasarca    Sleep apnea with use of continuous positive airway pressure (CPAP)    Medically noncompliant    Diabetic ulcer of left foot associated with type 2 diabetes mellitus    Diabetic polyneuropathy associated with type 2 diabetes mellitus    Spinal stenosis in cervical region    Degeneration of cervical intervertebral disc    Cervical radiculopathy    Degeneration of lumbar or lumbosacral intervertebral disc    Cervical myelopathy    Bilateral carpal tunnel syndrome    CAD (coronary artery disease)    GERD without esophagitis    BPH without obstruction/lower urinary tract symptoms    Stage 3b chronic kidney disease    Chronic diastolic heart failure    Type 2 myocardial infarction due to heart failure    Left carpal tunnel syndrome    Syncope and collapse, recurrent episodes    Poorly-controlled hypertension    Rhinovirus    Peripheral vascular disease    Chronic kidney disease (CKD), stage IV (severe)    Diabetic foot infection    Sepsis    Epigastric pain    Chronic heart failure with preserved ejection fraction (HFpEF)    Sepsis  due to methicillin resistant Staphylococcus aureus (MRSA) with encephalopathy without septic shock    Slow transit constipation    CHF exacerbation    CHF (congestive heart failure), NYHA class I, acute on chronic, combined    Rectal bleeding    Acute on chronic heart failure with preserved ejection fraction (HFpEF)    DKA, type 2, not at goal    Atrial fibrillation    E. coli UTI, ESBL    Acute UTI (urinary tract infection)    Acute encephalopathy    Type 2 diabetes mellitus with hyperglycemia, with long-term current use of insulin     Past Medical History:   Diagnosis Date    Arthritis     Autonomic disease     CAD (coronary artery disease) 02/06/2017    Cervical radiculopathy 09/16/2021    Chronic constipation with acute exaccerbation 05/10/2021    Coronary artery disease     Degeneration of cervical intervertebral disc 08/11/2021    Diabetes mellitus     Diabetic foot ulcer 08/31/2020    Diabetic polyneuropathy associated with type 2 diabetes mellitus 01/18/2021    Elevated cholesterol     Gastroesophageal reflux disease 05/13/2019    Headache     HTN (hypertension), benign 05/03/2017    Hyperlipidemia     Hypertension     Mixed hyperlipidemia 02/07/2017    Multiple lung nodules 01/26/2020    5mm, 9 mm RLL identified 1/2020, not present 10/2019.    Myocardial infarction     Osteomyelitis 01/22/2020    Osteomyelitis of fifth toe of right foot 10/07/2019    Pancreatitis     Persistent insomnia 01/20/2020    Renal disorder     Sleep apnea 02/06/2017    Sleep apnea with use of continuous positive airway pressure (CPAP)     NON-COMPLIANT    Slow transit constipation 01/16/2019    Spinal stenosis in cervical region 09/16/2021    Vitamin D deficiency 03/02/2021     Past Surgical History:   Procedure Laterality Date    ABDOMINAL SURGERY      AMPUTATION FOOT / TOE Left 10/2021    5th digit     ANTERIOR CERVICAL DISCECTOMY W/ FUSION N/A 08/05/2022    Procedure: CERVICAL DISCECTOMY ANTERIOR WITH FUSION C5-6 with possible  plating of C5-7 with neuromonitoring and 1 c-arm;  Surgeon: Karel Soliz MD;  Location: [facility] OR;  Service: Neurosurgery;  Laterality: N/A;    APPENDECTOMY      BACK SURGERY      CARDIAC CATHETERIZATION Left 02/08/2021    Procedure: Left Heart Cath w poss intervention left anatomical snuff box acess;  Surgeon: Omkar Charles DO;  Location: [facility] CATH INVASIVE LOCATION;  Service: Cardiology;  Laterality: Left;    CARDIAC SURGERY      CATARACT EXTRACTION      CERVICAL SPINE SURGERY      COLONOSCOPY N/A 01/31/2017    Normal exam repeat in 5 years    COLONOSCOPY N/A 02/11/2019    Mild acute inflammation    COLONOSCOPY N/A 04/07/2024    2 areas at 10 and 20 cm with friability ulceration 2 clips placed at 20 cm and 4 clips at 10 cm poor prep normal mucosa,mild eroisions and ulcerations in visible vessels    COLONOSCOPY N/A 7/1/2024    Procedure: COLONOSCOPY WITH ANESTHESIA;  Surgeon: Arsalan Lorenzo DO;  Location: [facility] ENDOSCOPY;  Service: Gastroenterology;  Laterality: N/A;  pre op constipation/diarrhea  post poor prep  pcp Del Shetty    COLONOSCOPY N/A 7/2/2024    Procedure: COLONOSCOPY WITH ANESTHESIA;  Surgeon: Agapito Christopher MD;  Location: [facility] ENDOSCOPY;  Service: Gastroenterology;  Laterality: N/A;  pre rectal bleeding  post poor prep  pcp Del Shetty MD    COLONOSCOPY W/ POLYPECTOMY  03/04/2014    Hyperplastic polyp    CORONARY ARTERY BYPASS GRAFT  10/2015    ENDOSCOPY  04/13/2011    Gastritis with hemorrhage    ENDOSCOPY N/A 05/05/2017    Normal exam    ENDOSCOPY N/A 02/11/2019    Gastritis    ENDOSCOPY N/A 09/01/2020    Non-erosive gastritis with hemorrhage    ENDOSCOPY N/A 02/10/2021    Esophagitis    ENDOSCOPY N/A 04/11/2024    There were esophageal mucosal changes suspicious for short-segment Low's esophagus present in the distal esophagus. The maximum longitudinal extent of these mucosal changes was 2 cm in length. Mucosa was biopsied with a cold forceps for  histologyDistal esophagus, biopsies: Mild chronic active esophagogastritis. No evidence of intestinal metaplasia, dysplasia. Antrum, bx, Mild chronic gastritis    FOOT SURGERY Left     INCISION AND DRAINAGE OF WOUND Left 09/2007    spider bite     PT Assessment (Last 12 Hours)       PT Evaluation and Treatment       [facility] Name 11/26/24 0951          Physical Therapy Time and Intention    Subjective Information complains of;fatigue;weakness  -LY     Document Type therapy note (daily note)  -LY     Mode of Treatment physical therapy  -LY       Row Name 11/26/24 0951          General Information    Existing Precautions/Restrictions fall;non-weight bearing  NWB LLE unless wearing walking boot  -LY       Row Name 11/26/24 0951          Pain    Pretreatment Pain Rating 7/10  -LY     Posttreatment Pain Rating 7/10  -LY     Pain Location flank  -LY     Pain Side/Orientation right  -LY     Pain Management Interventions exercise or physical activity utilized  -LY     Response to Pain Interventions activity participation with tolerable pain  -LY     Pre/Posttreatment Pain Comment reports L foot pain with ambulation  -LY       Row Name 11/26/24 0951          Mobility    Extremity Weight-bearing Status left lower extremity  -LY     Left Lower Extremity (Weight-bearing Status) non weight-bearing (NWB)  can WB if wearing L walking boot  -LY       [facility] Name 11/26/24 0951          Bed Mobility    Comment, (Bed Mobility) chair  -LY       Row Name 11/26/24 0951          Sit-Stand Transfer    Sit-Stand [place] (Transfers) standby assist  -LY     Assistive Device (Sit-Stand Transfers) walker, front-wheeled  -LY       [facility] Name 11/26/24 0951          Stand-Sit Transfer    Stand-Sit [place] (Transfers) standby assist  -LY     Assistive Device (Stand-Sit Transfers) walker, front-wheeled  -LY       Row Name 11/26/24 0951          Gait/Stairs (Locomotion)    [place] Level (Gait) contact guard  -LY     Assistive Device (Gait)  walker, front-wheeled  -LY     Distance in Feet (Gait) 50  x3  -LY     Pattern (Gait) step-to  -LY     Deviations/Abnormal Patterns (Gait) antalgic;gait speed decreased;stride length decreased  -LY     Bilateral Gait Deviations forward flexed posture;heel strike decreased  -LY       Row Name 11/26/24 0951          Aerobic Exercise    Comment, Aerobic Exercise (Therapeutic Exercise) BLE AROM x20 reps seated  -LY       Row Name             Wound 08/22/23 0140 Left posterior plantar    Wound - Properties Group Placement Date: 08/22/23  -AM Placement Time: 0140  -AM Side: Left  -AM Orientation: posterior  -AM Location: plantar  -AM Present on Original Admission: Y  -AM    Retired Wound - Properties Group Placement Date: 08/22/23  -AM Placement Time: 0140  -AM Present on Original Admission: Y  -AM Side: Left  -AM Orientation: posterior  -AM Location: plantar  -AM    Retired Wound - Properties Group Placement Date: 08/22/23  -AM Placement Time: 0140  -AM Present on Original Admission: Y  -AM Side: Left  -AM Orientation: posterior  -AM Location: plantar  -AM    Retired Wound - Properties Group Date first assessed: 08/22/23  -AM Time first assessed: 0140  -AM Present on Original Admission: Y  -AM Side: Left  -AM Location: plantar  -AM      Row Name             Wound 06/15/23 0102 Left anterior plantar    Wound - Properties Group Placement Date: 06/15/23  -SM Placement Time: 0102  -SM Side: Left  -SM Orientation: anterior  -SM Location: plantar  -SM Removal Date: --  -JR Removal Time: --  -JR    Retired Wound - Properties Group Placement Date: 06/15/23  -SM Placement Time: 0102  -SM Side: Left  -SM Orientation: anterior  -SM Location: plantar  -SM Removal Date: --  -JR Removal Time: --  -JR    Retired Wound - Properties Group Placement Date: 06/15/23  -SM Placement Time: 0102  -SM Side: Left  -SM Orientation: anterior  -SM Location: plantar  -SM Removal Date: --  -JR Removal Time: --  -JR    Retired Wound - Properties Group  Date first assessed: 06/15/23  -SM Time first assessed: 0102  -SM Side: Left  -SM Location: plantar  -SM Resolution Date: --  -JR Resolution Time: --  -JR      Row Name             Wound 11/18/24 0046 Left lower arm skin tear;abrasion    Wound - Properties Group Placement Date: 11/18/24  -JR Placement Time: 0046  -JR Side: Left  -JR Orientation: lower  -JR Location: arm  -JR Primary Wound Type: Abrasion  -JR Type: skin tear;abrasion  -JR Present on Original Admission: Y  -JR    Retired Wound - Properties Group Placement Date: 11/18/24  -JR Placement Time: 0046  -JR Present on Original Admission: Y  -JR Side: Left  -JR Orientation: lower  -JR Location: arm  -JR Primary Wound Type: Abrasion  -JR Type: skin tear;abrasion  -JR    Retired Wound - Properties Group Placement Date: 11/18/24  -JR Placement Time: 0046 -JR Present on Original Admission: Y  -JR Side: Left  -JR Orientation: lower  -JR Location: arm  -JR Primary Wound Type: Abrasion  -JR Type: skin tear;abrasion  -JR    Retired Wound - Properties Group Date first assessed: 11/18/24  - Time first assessed: 0046  -JR Present on Original Admission: Y  -JR Side: Left  -JR Location: arm  -JR Primary Wound Type: Abrasion  -JR Type: skin tear;abrasion  -JR      Row Name             Wound 11/18/24 0056 Left medial foot diabetic ulcer    Wound - Properties Group Placement Date: 11/18/24  -JR Placement Time: 0056  -JR Side: Left  -JR Orientation: medial  -JR Location: foot  -JR Primary Wound Type: Diabetic ulc  -JR Type: diabetic ulcer  -JR Present on Original Admission: Y  -JR    Retired Wound - Properties Group Placement Date: 11/18/24  -JR Placement Time: 0056  -JR Present on Original Admission: Y  -JR Side: Left  -JR Orientation: medial  -JR Location: foot  -JR Primary Wound Type: Diabetic ulc  -JR Type: diabetic ulcer  -JR    Retired Wound - Properties Group Placement Date: 11/18/24  -JR Placement Time: 0056  -JR Present on Original Admission: Y  -JR Side: Left   -JR Orientation: medial  -JR Location: foot  -JR Primary Wound Type: Diabetic ulc  -JR Type: diabetic ulcer  -JR    Retired Wound - Properties Group Date first assessed: 11/18/24  -JR Time first assessed: 0056  -JR Present on Original Admission: Y  -JR Side: Left  -JR Location: foot  -JR Primary Wound Type: Diabetic ulc  -JR Type: diabetic ulcer  -JR      Row Name 11/26/24 0951          Plan of Care Review    Plan of Care Reviewed With patient  -LY     Progress improving  -LY     Outcome Evaluation PT tx completed. Pt up in chair on arrival, agrees to therapy. Assisted with cam boot donning on L foot. Completed BLE AROM seated x20 reps while seated. SBA for sit to stands. Amb 50' at a time with RWX and CGA. C/o L foot and R flank plain during mobility. Required seated rest breaks. Will cont to follow as pt tolerates.  -LY       Row Name 11/26/24 0951          Positioning and Restraints    Pre-Treatment Position sitting in chair/recliner  -LY     Post Treatment Position chair  -LY     In Chair reclined;call light within reach;encouraged to call for assist  -LY               User Key  (r) = Recorded By, (t) = Taken By, (c) = Cosigned By      Initials Name Provider Type    LY Alesha Dominguez, PTA Physical Therapist Assistant    Faviola Kunz RN Registered Nurse    Michelle Diaz, RN Registered Nurse    Sabas Young LPNA Licensed Nurse    Sabas Young, RN Registered Nurse                    Physical Therapy Education       Title: PT OT SLP Therapies (In Progress)       Topic: Physical Therapy (Not Started)       Point: Mobility training (In Progress)       Learning Progress Summary            Patient Acceptance, E, NR by AE at 11/21/2024 0850    Comment: NWB L LE and need for CAM boot    Acceptance, E, VU by JACIEL at 11/19/2024 0958    Comment: limited gait until boot for RLE    Acceptance, E,TB,D, VU,NR by JE at 11/18/2024 1128    Comment: education re: purpose of PT/importance of activity,  safety/falls prevention, NWB L LE due to open wounds, improved tech w/ tfers, positioning w/ L LE elevated                      Point: Home exercise program (In Progress)       Learning Progress Summary            Patient Acceptance, E, NR by AE at 11/21/2024 0850    Comment: NWB L LE and need for CAM boot                      Point: Precautions (Done)       Learning Progress Summary            Patient Acceptance, E,TB,D, VU,NR by JE at 11/18/2024 1128    Comment: education re: purpose of PT/importance of activity, safety/falls prevention, NWB L LE due to open wounds, improved tech w/ tfers, positioning w/ L LE elevated                                      User Key       Initials Effective Dates Name Provider Type Discipline    AE 02/03/23 -  Hansa Arambula, PTA Physical Therapist Assistant PT    JACIEL 02/03/23 -  Sabas Maharaj, PTA Physical Therapist Assistant PT    NIKO 08/02/18 -  Melly Mustafa, PT Physical Therapist PT                  PT Recommendation and Plan     Plan of Care Reviewed With: patient  Progress: improving  Outcome Evaluation: PT tx completed. Pt up in chair on arrival, agrees to therapy. Assisted with cam boot donning on L foot. Completed BLE AROM seated x20 reps while seated. SBA for sit to stands. Amb 50' at a time with RWX and CGA. C/o L foot and R flank plain during mobility. Required seated rest breaks. Will cont to follow as pt tolerates.   Outcome Measures       Row Name 11/26/24 0900 11/24/24 9547          How much help from another person do you currently need...    Turning from your back to your side while in flat bed without using bedrails? -- 4  -TB     Moving from lying on back to sitting on the side of a flat bed without bedrails? -- 4  -TB     Moving to and from a bed to a chair (including a wheelchair)? -- 4  -TB     Standing up from a chair using your arms (e.g., wheelchair, bedside chair)? -- 4  -TB     Climbing 3-5 steps with a railing? -- 3  -TB     To walk in hospital  room? -- 4  -TB     AM-PAC 6 Clicks Score (PT) -- 23  -TB        How much help from another is currently needed...    Putting on and taking off regular lower body clothing? 2  -AC (r) JR (t) AC (c) --     Bathing (including washing, rinsing, and drying) 3  -AC (r) JR (t) AC (c) --     Toileting (which includes using toilet bed pan or urinal) 3  -AC (r) JR (t) AC (c) --     Putting on and taking off regular upper body clothing 4  -AC (r) JR (t) AC (c) --     Taking care of personal grooming (such as brushing teeth) 4  -AC (r) JR (t) AC (c) --     Eating meals 4  -AC (r) JR (t) AC (c) --     AM-PAC 6 Clicks Score (OT) 20  -AC (r) JR (t) --        Functional Assessment    Outcome Measure Options AM-PAC 6 Clicks Daily Activity (OT)  -AC (r) JR (t) AC (c) AM-PAC 6 Clicks Basic Mobility (PT)  -TB               User Key  (r) = Recorded By, (t) = Taken By, (c) = Cosigned By      Initials Name Provider Type    AC Ezekiel Blake N, OTR/L, CNT Occupational Therapist    Wendy Duvall, PTA Physical Therapist Assistant    Fifi Wren, OT Student OT Student                     Time Calculation:    PT Charges       Row Name 11/26/24 1030             Time Calculation    Start Time 0951  -LY      Stop Time 1014  -LY      Time Calculation (min) 23 min  -LY      PT Received On 11/26/24  -LY         Time Calculation- PT    Total Timed Code Minutes- PT 23 minute(s)  -LY         Timed Charges    27511 - PT Therapeutic Exercise Minutes 10  -LY      72105 - Gait Training Minutes  13  -LY         Total Minutes    Timed Charges Total Minutes 23  -LY       Total Minutes 23  -LY                User Key  (r) = Recorded By, (t) = Taken By, (c) = Cosigned By      Initials Name Provider Type    LY Alesha Dominguez, PTA Physical Therapist Assistant                  Therapy Charges for Today       Code Description Service Date Service Provider Modifiers Qty    43621695908 HC PT THER PROC EA 15 MIN 11/26/2024 Alesha Dominguez, PTA GP 1     [id number]  GAIT TRAINING EA 15 MIN 11/26/2024 Alesha Dominguez, PTA GP 1            PT G-Codes  Outcome Measure Options: AM-PAC 6 Clicks Daily Activity (OT)  AM-PAC 6 Clicks Score (PT): 21  AM-PAC 6 Clicks Score (OT): 20    Alesha Dominguez PTA  11/26/2024

## 2024-11-26 NOTE — PLAN OF CARE
Goal Outcome Evaluation:  Plan of Care Reviewed With: patient        Progress: improving                   A/o x 4. C/O pain. (See MAR) accucheck. Refused long acting insulin. Dressing changes to L Foot and arm. Contact precaution maintained. ABX infusing per orders. IID VSS. Safety maintained.

## 2024-11-26 NOTE — PLAN OF CARE
Goal Outcome Evaluation:  Plan of Care Reviewed With: patient           Outcome Evaluation: A&OX4, vitals stable. C/o chronic back and foot pain, see MAR. accuchmaria luisa, pt continuing to refuse additinal 10 units of humalog with meals, chronic wounds to left foot and left arm, drsg CDI.  kidney function improving, encouarging PO intake. per spouse, pt is having upper respiratory symptoms, cough, runny nose, sore throat; MD notified. Resp. panel ordered. up x1 walker and cam boot, walked in hallway with PT today. contact precautions maintained, Safety maintained.

## 2024-11-26 NOTE — PROGRESS NOTES
Nephrology ([facility]) Progress Note      Patient:  Erick Luong  YOB: 1955  Date of Service: 11/26/2024  MRN: [id number]   Acct: [id number]   Primary Care Physician: Del Shetty MD  Advance Directive:   Code Status and Medical Interventions: CPR (Attempt to Resuscitate); Full Support   Ordered at: 11/18/24 0159     Code Status (Patient has no pulse and is not breathing):    CPR (Attempt to Resuscitate)     Medical Interventions (Patient has pulse or is breathing):    Full Support     Admit Date: 11/17/2024       Hospital Day: 7  Referring Provider: Garrett Alicia,*      Patient personally seen and examined.  Complete chart including Consults, Notes, Operative Reports, Labs, Cardiology, and Radiology studies reviewed as able.        Subjective:  Erick Luong is a 69 y.o. male for whom we were consulted for evaluation and treatment of acute kidney injury. Baseline chronic kidney disease stage 3b, baseline creatinine approximately 1.5-1.8.  Follows in our office. History of uncontrolled diabetes, hypertension, coronary artery disease, diabetic foot ulcer, obesity. Admitted with cystitis, foot wound and failure to thrive. Minimal PO intake recently. Had no specific complaints at time of nephrology initial exam. Hospital course remarkable for administration of IV fluids and reduction of Bumex dose. Bumex was stopped entirely afternoon of 11/21. Has been receiving IV fluids intermittently since then and renal function has been fluctuating.     Today is awake and alert. No new complaint or overnight issues.  Urine output nonoliguric     Allergies:  Cefepime, Bactrim [sulfamethoxazole-trimethoprim], Vancomycin, Zolpidem, and Metronidazole    Home Meds:  Medications Prior to Admission   Medication Sig Dispense Refill Last Dose/Taking    apixaban (Eliquis) 5 MG tablet tablet Take 1 tablet by mouth 2 (Two) Times a Day. 60 tablet 0 Taking    ascorbic acid (VITAMIN C)  1000 MG tablet Take 1 tablet by mouth Daily. 30 tablet 3 Taking    bumetanide (BUMEX) 2 MG tablet Take 1 tablet by mouth 2 (Two) Times a Day. 6 tablet 3 Taking    busPIRone (BUSPAR) 10 MG tablet Take 1 tablet by mouth 3 (Three) Times a Day As Needed. (Patient taking differently: Take 1 tablet by mouth 3 (Three) Times a Day As Needed (anxiety).) 270 tablet 4 Taking Differently    donepezil (ARICEPT) 10 MG tablet Take 1 tablet by mouth every night at bedtime. 30 tablet 0 Taking    DULoxetine (CYMBALTA) 60 MG capsule Take 1 capsule by mouth Daily. 30 capsule 0 Taking    famotidine (PEPCID) 20 MG tablet Take 1 tablet by mouth 2 (Two) Times a Day. 60 tablet 0 Taking    insulin glargine (Lantus) 100 UNIT/ML injection Inject 35 Units under the skin into the appropriate area as directed every night at bedtime. 10 mL 0 Taking    lisinopril (PRINIVIL,ZESTRIL) 5 MG tablet Take 1 tablet by mouth Daily. 30 tablet 0 Taking    melatonin 3 MG tablet Take 2 tablets by mouth At Night As Needed for Sleep. 30 tablet 0 Taking As Needed    midodrine (PROAMATINE) 2.5 MG tablet Take 1 tablet by mouth 2 (Two) Times a Day. 60 tablet 5 Taking    oxyCODONE (ROXICODONE) 10 MG tablet Take 1 tablet by mouth 2 (Two) Times a Day As Needed. (Patient taking differently: Take 1 tablet by mouth 2 (Two) Times a Day As Needed for Moderate Pain or Severe Pain. *must last 30 days*) 55 tablet 0 Taking Differently    pantoprazole (PROTONIX) 40 MG EC tablet Take 1 tablet by mouth 2 (Two) Times a Day. 60 tablet 0 Taking    polyethylene glycol (MIRALAX) 17 GM/SCOOP powder Take 17 g by mouth Daily As Needed (constipation).   Taking As Needed    potassium chloride ER (K-TAB) 20 MEQ tablet controlled-release ER tablet Take 1 tablet by mouth Daily. 30 tablet 0 Taking    rosuvastatin (CRESTOR) 10 MG tablet Take 1 tablet by mouth Daily. 30 tablet 0 Taking    sennosides-docusate (PERICOLACE) 8.6-50 MG per tablet Take 1 tablet by mouth Every Night. Obtain OTC   Taking     tamsulosin (FLOMAX) 0.4 MG capsule 24 hr capsule Take 1 capsule by mouth Daily. 90 capsule 4 Taking    Insulin Lispro (humaLOG) 100 UNIT/ML injection Inject 2-12 Units under the skin into the appropriate area as directed 4 (Four) Times a Day Before Meals & at Bedtime. Inject subcutaneously four times a day per sliding scale:  0-150 = 0 units; 151-200 = 2u; 201-250 = 4u; 251-300 = 6u; 301-350 = 8u; 351-400 = 10u; 401+ = 12u       nitroglycerin (NITROSTAT) 0.4 MG SL tablet Place 1 tablet under the tongue Every 5 (Five) Minutes As Needed for Chest Pain. Take no more than 3 doses in 15 minutes.       pregabalin (LYRICA) 100 MG capsule Take 1 capsule by mouth Daily.       pregabalin (LYRICA) 100 MG capsule Take 2 capsules by mouth Every Night.       sodium hypochlorite (DAKIN'S 1/4 STRENGTH) 0.125 % solution topical solution 0.125% Apply  topically to the appropriate area as directed 2 (Two) Times a Day. 473 mL 5        Medicines:  Current Facility-Administered Medications   Medication Dose Route Frequency Provider Last Rate Last Admin    acetaminophen (TYLENOL) tablet 650 mg  650 mg Oral Q4H PRN Garrett Alicia MD        Or    acetaminophen (TYLENOL) 160 MG/5ML oral solution 650 mg  650 mg Oral Q4H PRN Garrett Alicia MD        Or    acetaminophen (TYLENOL) suppository 650 mg  650 mg Rectal Q4H PRN Garrett Alicia MD        apixaban (ELIQUIS) tablet 5 mg  5 mg Oral BID Garrett Alicia MD   5 mg at 11/26/24 0831    [Held by provider] ascorbic acid (VITAMIN C) tablet 500 mg  500 mg Oral BID Telly Rodriguez MD   500 mg at 11/20/24 0837    sennosides-docusate (PERICOLACE) 8.6-50 MG per tablet 2 tablet  2 tablet Oral BID PRN Garrett Alicia MD        And    polyethylene glycol (MIRALAX) packet 17 g  17 g Oral Daily PRN Garrett Alicia MD   17 g at 11/26/24 0831    And    bisacodyl (DULCOLAX) EC tablet 5 mg  5 mg Oral Daily PRN Garrett Alicia MD   5 mg at  11/22/24 0924    And    bisacodyl (DULCOLAX) suppository 10 mg  10 mg Rectal Daily PRN Garrett Alicia MD        [Held by provider] bumetanide (BUMEX) tablet 1 mg  1 mg Oral BID Telly Rodriguez MD   1 mg at 11/21/24 0914    busPIRone (BUSPAR) tablet 10 mg  10 mg Oral TID PRN Garrett Alicia MD   10 mg at 11/20/24 2055    calcium carbonate (TUMS) chewable tablet 500 mg (200 mg elemental)  2 tablet Oral TID PRN Telly Rodriguez MD   2 tablet at 11/20/24 1459    Calcium Replacement - Follow Nurse / BPA Driven Protocol   Not Applicable PRN Telly Rodriguez MD        dextrose (D50W) (25 g/50 mL) IV injection 25 g  25 g Intravenous Q15 Min PRN Garrett Alicia MD        dextrose (GLUTOSE) oral gel 15 g  15 g Oral Q15 Min PRN Garrett Alicia MD   15 g at 11/19/24 0040    docusate sodium (COLACE) capsule 100 mg  100 mg Oral BID Telly Rodriguez MD   100 mg at 11/26/24 0832    [Held by provider] donepezil (ARICEPT) tablet 10 mg  10 mg Oral Nightly Garrett Alicia MD   10 mg at 11/18/24 0219    DULoxetine (CYMBALTA) DR capsule 30 mg  30 mg Oral Daily Telly Rodriguez MD   30 mg at 11/26/24 0832    ertapenem (INVanz) 1,000 mg in sodium chloride 0.9 % 100 mL MBP  1,000 mg Intravenous Q24H Germania Sheridan  mL/hr at 11/25/24 2040 1,000 mg at 11/25/24 2040    famotidine (PEPCID) injection 20 mg  20 mg Intravenous Daily Telly Rodriguez MD   20 mg at 11/26/24 0831    glucagon (GLUCAGEN) injection 1 mg  1 mg Intramuscular Q15 Min PRN Garrett Alicia MD        insulin glargine (LANTUS, SEMGLEE) injection 20 Units  20 Units Subcutaneous Nightly Polidori, Garrett Rick, MD   20 Units at 11/18/24 2143    Insulin Lispro (humaLOG) injection 10 Units  10 Units Subcutaneous TID With Meals Garrett Alicia MD   10 Units at 11/24/24 1705    Insulin Lispro (humaLOG) injection 2-9 Units  2-9 Units Subcutaneous 4x Daily AC & at Bedtime Garrett Alicia MD   4 Units at  11/25/24 2014    lisinopril (PRINIVIL,ZESTRIL) tablet 5 mg  5 mg Oral Daily Garrett Alicia MD   5 mg at 11/26/24 0831    Magnesium Standard Dose Replacement - Follow Nurse / BPA Driven Protocol   Not Applicable Telly Rios MD        multivitamin with minerals 1 tablet  1 tablet Oral Daily Telly Rodriguez MD   1 tablet at 11/26/24 0832    nitroglycerin (NITROSTAT) SL tablet 0.4 mg  0.4 mg Sublingual Q5 Min PRN Garrett Alicia MD        ondansetron (ZOFRAN) injection 4 mg  4 mg Intravenous Q6H PRN Garrett Alicia MD   4 mg at 11/25/24 2206    oxyCODONE-acetaminophen (PERCOCET) 5-325 MG per tablet 1 tablet  1 tablet Oral Q4H PRN Germania Sheridan MD   1 tablet at 11/26/24 0832    Phosphorus Replacement - Follow Nurse / BPA Driven Protocol   Not Applicable PRN Telly Rodriguez MD        Potassium Replacement - Follow Nurse / BPA Driven Protocol   Not Applicable PRN Telly Rodriguez MD        pregabalin (LYRICA) capsule 75 mg  75 mg Oral Nightly Telly Rodriguez MD   75 mg at 11/25/24 2012    prochlorperazine (COMPAZINE) injection 5 mg  5 mg Intravenous Q6H PRN Telly Rodriguez MD   5 mg at 11/20/24 1456    rosuvastatin (CRESTOR) tablet 10 mg  10 mg Oral Daily Garrett Alicia MD   10 mg at 11/26/24 0832    sennosides-docusate (PERICOLACE) 8.6-50 MG per tablet 1 tablet  1 tablet Oral Nightly Garrett Alicia MD   1 tablet at 11/25/24 2012    sodium chloride 0.9 % flush 10 mL  10 mL Intravenous Q12H Garrett Alicia MD   10 mL at 11/26/24 0832    sodium chloride 0.9 % flush 10 mL  10 mL Intravenous PRN Garrett Alicia MD        sodium chloride 0.9 % infusion 40 mL  40 mL Intravenous PRN Garrett Alicia MD        tamsulosin (FLOMAX) 24 hr capsule 0.4 mg  0.4 mg Oral Daily Garrett Alicia MD   0.4 mg at 11/26/24 0832    [Held by provider] zinc sulfate (ZINCATE) capsule 220 mg  220 mg Oral Daily Telly Rodriguez MD   220 mg at 11/20/24 0836        Past Medical History:  Past Medical History:   Diagnosis Date    Arthritis     Autonomic disease     CAD (coronary artery disease) 02/06/2017    Cervical radiculopathy 09/16/2021    Chronic constipation with acute exaccerbation 05/10/2021    Coronary artery disease     Degeneration of cervical intervertebral disc 08/11/2021    Diabetes mellitus     Diabetic foot ulcer 08/31/2020    Diabetic polyneuropathy associated with type 2 diabetes mellitus 01/18/2021    Elevated cholesterol     Gastroesophageal reflux disease 05/13/2019    Headache     HTN (hypertension), benign 05/03/2017    Hyperlipidemia     Hypertension     Mixed hyperlipidemia 02/07/2017    Multiple lung nodules 01/26/2020    5mm, 9 mm RLL identified 1/2020, not present 10/2019.    Myocardial infarction     Osteomyelitis 01/22/2020    Osteomyelitis of fifth toe of right foot 10/07/2019    Pancreatitis     Persistent insomnia 01/20/2020    Renal disorder     Sleep apnea 02/06/2017    Sleep apnea with use of continuous positive airway pressure (CPAP)     NON-COMPLIANT    Slow transit constipation 01/16/2019    Spinal stenosis in cervical region 09/16/2021    Vitamin D deficiency 03/02/2021       Past Surgical History:  Past Surgical History:   Procedure Laterality Date    ABDOMINAL SURGERY      AMPUTATION FOOT / TOE Left 10/2021    5th digit     ANTERIOR CERVICAL DISCECTOMY W/ FUSION N/A 08/05/2022    Procedure: CERVICAL DISCECTOMY ANTERIOR WITH FUSION C5-6 with possible plating of C5-7 with neuromonitoring and 1 c-arm;  Surgeon: Kaerl Soliz MD;  Location:  [facility] OR;  Service: Neurosurgery;  Laterality: N/A;    APPENDECTOMY      BACK SURGERY      CARDIAC CATHETERIZATION Left 02/08/2021    Procedure: Left Heart Cath w poss intervention left anatomical snuff box acess;  Surgeon: Omkar Charles DO;  Location:  [facility] CATH INVASIVE LOCATION;  Service: Cardiology;  Laterality: Left;    CARDIAC SURGERY      CATARACT EXTRACTION      CERVICAL  SPINE SURGERY      COLONOSCOPY N/A 01/31/2017    Normal exam repeat in 5 years    COLONOSCOPY N/A 02/11/2019    Mild acute inflammation    COLONOSCOPY N/A 04/07/2024    2 areas at 10 and 20 cm with friability ulceration 2 clips placed at 20 cm and 4 clips at 10 cm poor prep normal mucosa,mild eroisions and ulcerations in visible vessels    COLONOSCOPY N/A 7/1/2024    Procedure: COLONOSCOPY WITH ANESTHESIA;  Surgeon: Arsalan Lorenzo DO;  Location: [facility] ENDOSCOPY;  Service: Gastroenterology;  Laterality: N/A;  pre op constipation/diarrhea  post poor prep  pcp Del Shetty    COLONOSCOPY N/A 7/2/2024    Procedure: COLONOSCOPY WITH ANESTHESIA;  Surgeon: Agapito Christopher MD;  Location: [facility] ENDOSCOPY;  Service: Gastroenterology;  Laterality: N/A;  pre rectal bleeding  post poor prep  pcp Del Shetty MD    COLONOSCOPY W/ POLYPECTOMY  03/04/2014    Hyperplastic polyp    CORONARY ARTERY BYPASS GRAFT  10/2015    ENDOSCOPY  04/13/2011    Gastritis with hemorrhage    ENDOSCOPY N/A 05/05/2017    Normal exam    ENDOSCOPY N/A 02/11/2019    Gastritis    ENDOSCOPY N/A 09/01/2020    Non-erosive gastritis with hemorrhage    ENDOSCOPY N/A 02/10/2021    Esophagitis    ENDOSCOPY N/A 04/11/2024    There were esophageal mucosal changes suspicious for short-segment Low's esophagus present in the distal esophagus. The maximum longitudinal extent of these mucosal changes was 2 cm in length. Mucosa was biopsied with a cold forceps for histologyDistal esophagus, biopsies: Mild chronic active esophagogastritis. No evidence of intestinal metaplasia, dysplasia. Antrum, bx, Mild chronic gastritis    FOOT SURGERY Left     INCISION AND DRAINAGE OF WOUND Left 09/2007    spider bite       Family History  Family History   Problem Relation Age of Onset    Colon cancer Father     Heart disease Father     Colon cancer Sister     Colon polyps Sister     Alzheimer's disease Mother     Coronary artery disease Sister     Coronary artery  disease Sister        Social History  Social History     Socioeconomic History    Marital status:    Tobacco Use    Smoking status: Former     Current packs/day: 0.00     Types: Cigarettes     Quit date:      Years since quittin.9    Smokeless tobacco: Never    Tobacco comments:     smoked in highschool   Vaping Use    Vaping status: Never Used   Substance and Sexual Activity    Alcohol use: No    Drug use: No    Sexual activity: Defer       Review of Systems:  History obtained from chart review and the patient  General ROS: No fever or chills  Respiratory ROS: No cough, shortness of breath, wheezing  Cardiovascular ROS: No chest pain or palpitations  Gastrointestinal ROS: No abdominal pain or melena  Genito-Urinary ROS: No dysuria or hematuria  Psych ROS: No anxiety and depression  14 point ROS reviewed with the patient and negative except as noted above and in the HPI unless unable to obtain.    Objective:  Patient Vitals for the past 24 hrs:   BP Temp Temp src Pulse Resp SpO2   24 0814 131/59 97.8 °F (36.6 °C) Oral 74 16 99 %   24 0327 125/64 97.8 °F (36.6 °C) Oral 64 16 97 %   24 1922 132/65 97.9 °F (36.6 °C) Oral 67 16 100 %   24 1606 117/55 97.6 °F (36.4 °C) Oral 67 16 97 %   24 1236 126/58 97.6 °F (36.4 °C) Oral 68 16 98 %       Intake/Output Summary (Last 24 hours) at 2024 1020  Last data filed at 2024 0930  Gross per 24 hour   Intake --   Output 2100 ml   Net -2100 ml     General: awake/alert   Chest:  clear to auscultation bilaterally without respiratory distress  CVS: regular rate and rhythm  Abdominal: soft, nontender, positive bowel sounds  Extremities: no cyanosis or edema  Skin: warm and dry without rash      Labs:  Results from last 7 days   Lab Units 24  0234 24  0545 24  0643   WBC 10*3/mm3 5.93 5.45 5.24   HEMOGLOBIN g/dL 9.9* 9.4* 9.7*   HEMATOCRIT % 31.5* 29.2* 29.1*   PLATELETS 10*3/mm3 244 233 239         Results from  last 7 days   Lab Units 11/26/24  0234 11/25/24  0545 11/24/24  0643   SODIUM mmol/L 138 138 139   POTASSIUM mmol/L 4.2 4.4 4.1   CHLORIDE mmol/L 107 107 106   CO2 mmol/L 22.0 21.0* 21.0*   BUN mg/dL 46* 46* 45*   CREATININE mg/dL 2.15* 2.55* 2.38*   CALCIUM mg/dL 8.1* 8.1* 8.1*   EGFR mL/min/1.73 32.7* 26.7* 29.0*   GLUCOSE mg/dL 131* 196* 200*       Radiology:   Imaging Results (Last 72 Hours)       ** No results found for the last 72 hours. **            Culture:  Blood Culture   Date Value Ref Range Status   11/18/2024 No growth at 3 days  Preliminary   11/17/2024 No growth at 3 days  Preliminary     Urine Culture   Date Value Ref Range Status   11/17/2024 >100,000 CFU/mL Escherichia coli (A)  Preliminary         Assessment    Acute kidney injury--improving  Baseline chronic kidney disease stage 3b  Hypertension  Uncontrolled type 2 diabetes (A1c 13.4%)  Anemia of CKD  Metabolic acidosis--improved  Acute cystitis  Obesity     Plan:  Renal function now showing some improvement. Continue PO fluids and continue to hold diuretics for now  Monitor labs      Stewart Alvarez, SUSAN  11/26/2024  10:20 CST

## 2024-11-26 NOTE — PLAN OF CARE
"Goal Outcome Evaluation:  Plan of Care Reviewed With: patient        Progress: no change  Outcome Evaluation: OT completed treatment. Pt reports \"I just feel bad.\" He completed supine to sit with Amanda, using HOB elevated and bed rails. Pt required maxA to don/doff L walking boot. He expressed that his wife will be helping him when he is at home. Pt ambulated into hallway with SBA and fww. He declined any other activity due to pain and nausea. Treatment ended with pt reclined in chair. He states that he does not need OT coming back and that he can complete all of his ADLs independently. Discharge home with assist when medically stable. OT to sign off.    Anticipated Discharge Disposition (OT): home with assist                        "

## 2024-11-26 NOTE — PLAN OF CARE
Goal Outcome Evaluation:  Plan of Care Reviewed With: patient        Progress: improving  Outcome Evaluation: PT tx completed. Pt up in chair on arrival, agrees to therapy. Assisted with cam boot donning on L foot. Completed BLE AROM seated x20 reps while seated. SBA for sit to stands. Amb 50' at a time with RWX and CGA. C/o L foot and R flank plain during mobility. Required seated rest breaks. Will cont to follow as pt tolerates.

## 2024-11-26 NOTE — PROGRESS NOTES
[facility] Services  INPATIENT PROGRESS NOTE    Patient Name: Erick Luong  Date of Admission: 11/17/2024  Today's Date: 11/26/24  Length of Stay: 7  Primary Care Physician: Del Shetty MD    Subjective   Chief Complaint: AMS/Failure to thrive    No acute events overnight.  Patient does not have any complaints at this time.  However later on in the day the spouse voiced to the RN that she is concerned he is coughing more and is feeling more fatigued. Ordering a respiratory PCR panel.    Review of Systems   All pertinent negatives and positives are as above. All other systems have been reviewed and are negative unless otherwise stated.     Objective    Temp:  [97.6 °F (36.4 °C)-97.9 °F (36.6 °C)] 97.8 °F (36.6 °C)  Heart Rate:  [64-74] 74  Resp:  [16] 16  BP: (117-132)/(55-65) 131/59  Physical Exam  GEN: Awake, alert, interactive, in NAD, obese  HEENT: Atraumatic,  EOMI, Anicteric,   Lungs: CTAB, no wheezing/rales/rhonchi  Heart: RRR, +S1/s2, no rub  ABD: soft, suprapubic tenderness, +BS, no guarding/rebound  Extremities: atraumatic, no cyanosis, no edema  Skin: bilateral wounds on feet  Neuro: AAOx3, no focal deficits  Psych: normal mood & affect        Results Review:  I have reviewed the labs, radiology results, and diagnostic studies.    Laboratory Data:   Results from last 7 days   Lab Units 11/26/24 0234 11/25/24  0545 11/24/24  0643   WBC 10*3/mm3 5.93 5.45 5.24   HEMOGLOBIN g/dL 9.9* 9.4* 9.7*   HEMATOCRIT % 31.5* 29.2* 29.1*   PLATELETS 10*3/mm3 244 233 239        Results from last 7 days   Lab Units 11/26/24 0234 11/25/24  0545 11/24/24  0643   SODIUM mmol/L 138 138 139   POTASSIUM mmol/L 4.2 4.4 4.1   CHLORIDE mmol/L 107 107 106   CO2 mmol/L 22.0 21.0* 21.0*   BUN mg/dL 46* 46* 45*   CREATININE mg/dL 2.15* 2.55* 2.38*   CALCIUM mg/dL 8.1* 8.1* 8.1*   GLUCOSE mg/dL 131* 196* 200*       Culture Data:   @[facility]@    Radiology Data:   Imaging Results  (Last 24 Hours)       ** No results found for the last 24 hours. **            I have reviewed the patient's current medications.     Assessment/Plan   Assessment  Active Hospital Problems    Diagnosis     **Acute UTI (urinary tract infection)     Acute encephalopathy     Type 2 diabetes mellitus with hyperglycemia, with long-term current use of insulin     Atrial fibrillation     Chronic heart failure with preserved ejection fraction (HFpEF)     Stage 3b chronic kidney disease     CAD (coronary artery disease)     BPH without obstruction/lower urinary tract symptoms     Diabetic ulcer of left foot associated with type 2 diabetes mellitus     Sleep apnea with use of continuous positive airway pressure (CPAP)     Essential hypertension      Erick Luong is a 69 y.o. male with past medical history of coronary artery disease, CABG, Afib, diabetes mellitus insulin-dependent, diabetic foot ulcer, hypertension, sleep apnea noncompliant with CPAP, right seventh rib fracture on 10/20/2024, presents emergency room with complaints of mental status changes disorientation suprapubic pain and burning with urination.     Treatment Plan:    # ESBL E. coli UTI  Day 9 of ertapenem today -will complete 10 days given persistent suprapubic tenderness and dysuria    # WANDER on CKD 3b - improving   Cr 2.83 >>> 3.00 > 2.55 > 2.15 today   - Nephrology following  --recommend encouraging p.o. intake  - continue to hold Bumex     # Wounds on Bilateral Feet  -Evaluated by wound care    # Hypertension - continue lisinopril  # Hyperlipidemia - continue crestor  # Diabetes mellitus - continue lantus 20, lispro 10/10/10, and SSI - pregabalin and cymbalta  # A-fib - continue Eliquis  # Constipation - docusate and pericolace  # BPH - continue home flomax         Medical Decision Making  Number and Complexity of problems: 1 acute, 1 acute on chronic, others chronic  Differential Diagnosis: As above    Conditions and Status        New to me but stable.        MDM Data  External documents reviewed: Reviewed  Cardiac tracing (EKG, telemetry) interpretation: Reviewed  Radiology interpretation: Reviewed  Labs reviewed: As above  Any tests that were considered but not ordered: None     Decision rules/scores evaluated (example [id number], Wells, etc): None     Discussed with: Patient     Care Planning  Shared decision making: Discussed with patient  Code status and discussions: Full code    Disposition  Social Determinants of Health that impact treatment or disposition: None  I expect the patient to be discharged to home in TBD days.         Electronically signed by Germania Sheridan MD, 11/26/24, 10:35 CST.

## 2024-11-27 ENCOUNTER — APPOINTMENT (OUTPATIENT)
Dept: GENERAL RADIOLOGY | Facility: HOSPITAL | Age: 69
DRG: 682 | End: 2024-11-27
Payer: MEDICARE

## 2024-11-27 LAB
ANION GAP SERPL CALCULATED.3IONS-SCNC: 10 MMOL/L (ref 5–15)
BUN SERPL-MCNC: 39 MG/DL (ref 8–23)
BUN/CREAT SERPL: 17.6 (ref 7–25)
CALCIUM SPEC-SCNC: 8.8 MG/DL (ref 8.6–10.5)
CHLORIDE SERPL-SCNC: 108 MMOL/L (ref 98–107)
CO2 SERPL-SCNC: 22 MMOL/L (ref 22–29)
CREAT SERPL-MCNC: 2.21 MG/DL (ref 0.76–1.27)
DEPRECATED RDW RBC AUTO: 50.6 FL (ref 37–54)
EGFRCR SERPLBLD CKD-EPI 2021: 31.7 ML/MIN/1.73
ERYTHROCYTE [DISTWIDTH] IN BLOOD BY AUTOMATED COUNT: 15.8 % (ref 12.3–15.4)
GLUCOSE BLDC GLUCOMTR-MCNC: 127 MG/DL (ref 70–130)
GLUCOSE BLDC GLUCOMTR-MCNC: 143 MG/DL (ref 70–130)
GLUCOSE BLDC GLUCOMTR-MCNC: 150 MG/DL (ref 70–130)
GLUCOSE BLDC GLUCOMTR-MCNC: 192 MG/DL (ref 70–130)
GLUCOSE SERPL-MCNC: 195 MG/DL (ref 65–99)
HCT VFR BLD AUTO: 34.3 % (ref 37.5–51)
HGB BLD-MCNC: 11.1 G/DL (ref 13–17.7)
MCH RBC QN AUTO: 28.5 PG (ref 26.6–33)
MCHC RBC AUTO-ENTMCNC: 32.4 G/DL (ref 31.5–35.7)
MCV RBC AUTO: 88.2 FL (ref 79–97)
MRSA DNA SPEC QL NAA+PROBE: ABNORMAL
NT-PROBNP SERPL-MCNC: 6325 PG/ML (ref 0–900)
PLATELET # BLD AUTO: 260 10*3/MM3 (ref 140–450)
PMV BLD AUTO: 11.3 FL (ref 6–12)
POTASSIUM SERPL-SCNC: 4.7 MMOL/L (ref 3.5–5.2)
PROCALCITONIN SERPL-MCNC: 0.08 NG/ML (ref 0–0.25)
RBC # BLD AUTO: 3.89 10*6/MM3 (ref 4.14–5.8)
SODIUM SERPL-SCNC: 140 MMOL/L (ref 136–145)
WBC NRBC COR # BLD AUTO: 7.23 10*3/MM3 (ref 3.4–10.8)

## 2024-11-27 PROCEDURE — 63710000001 INSULIN LISPRO (HUMAN) PER 5 UNITS: Performed by: FAMILY MEDICINE

## 2024-11-27 PROCEDURE — 82948 REAGENT STRIP/BLOOD GLUCOSE: CPT

## 2024-11-27 PROCEDURE — 25010000002 MEROPENEM PER 100 MG: Performed by: STUDENT IN AN ORGANIZED HEALTH CARE EDUCATION/TRAINING PROGRAM

## 2024-11-27 PROCEDURE — 87641 MR-STAPH DNA AMP PROBE: CPT | Performed by: STUDENT IN AN ORGANIZED HEALTH CARE EDUCATION/TRAINING PROGRAM

## 2024-11-27 PROCEDURE — 85027 COMPLETE CBC AUTOMATED: CPT | Performed by: FAMILY MEDICINE

## 2024-11-27 PROCEDURE — 84145 PROCALCITONIN (PCT): CPT | Performed by: STUDENT IN AN ORGANIZED HEALTH CARE EDUCATION/TRAINING PROGRAM

## 2024-11-27 PROCEDURE — 71046 X-RAY EXAM CHEST 2 VIEWS: CPT

## 2024-11-27 PROCEDURE — 83880 ASSAY OF NATRIURETIC PEPTIDE: CPT | Performed by: STUDENT IN AN ORGANIZED HEALTH CARE EDUCATION/TRAINING PROGRAM

## 2024-11-27 PROCEDURE — 74018 RADEX ABDOMEN 1 VIEW: CPT

## 2024-11-27 PROCEDURE — 25010000002 ONDANSETRON PER 1 MG: Performed by: FAMILY MEDICINE

## 2024-11-27 PROCEDURE — 80048 BASIC METABOLIC PNL TOTAL CA: CPT | Performed by: FAMILY MEDICINE

## 2024-11-27 PROCEDURE — 97164 PT RE-EVAL EST PLAN CARE: CPT

## 2024-11-27 RX ORDER — GUAIFENESIN 600 MG/1
1200 TABLET, EXTENDED RELEASE ORAL EVERY 12 HOURS SCHEDULED
Status: DISCONTINUED | OUTPATIENT
Start: 2024-11-27 | End: 2024-12-01 | Stop reason: HOSPADM

## 2024-11-27 RX ADMIN — DOCUSATE SODIUM 50 MG AND SENNOSIDES 8.6 MG 1 TABLET: 8.6; 5 TABLET, FILM COATED ORAL at 21:18

## 2024-11-27 RX ADMIN — MEROPENEM 1000 MG: 1 INJECTION, POWDER, FOR SOLUTION INTRAVENOUS at 23:11

## 2024-11-27 RX ADMIN — DULOXETINE HYDROCHLORIDE 30 MG: 30 CAPSULE, DELAYED RELEASE ORAL at 08:04

## 2024-11-27 RX ADMIN — FAMOTIDINE 20 MG: 10 INJECTION INTRAVENOUS at 08:06

## 2024-11-27 RX ADMIN — OXYCODONE HYDROCHLORIDE AND ACETAMINOPHEN 1 TABLET: 5; 325 TABLET ORAL at 08:04

## 2024-11-27 RX ADMIN — APIXABAN 5 MG: 5 TABLET, FILM COATED ORAL at 08:03

## 2024-11-27 RX ADMIN — DOCUSATE SODIUM 100 MG: 100 CAPSULE, LIQUID FILLED ORAL at 08:04

## 2024-11-27 RX ADMIN — TAMSULOSIN HYDROCHLORIDE 0.4 MG: 0.4 CAPSULE ORAL at 08:04

## 2024-11-27 RX ADMIN — APIXABAN 5 MG: 5 TABLET, FILM COATED ORAL at 21:18

## 2024-11-27 RX ADMIN — ROSUVASTATIN 10 MG: 10 TABLET, FILM COATED ORAL at 08:04

## 2024-11-27 RX ADMIN — GUAIFENESIN 1200 MG: 600 TABLET, EXTENDED RELEASE ORAL at 23:11

## 2024-11-27 RX ADMIN — DOCUSATE SODIUM 100 MG: 100 CAPSULE, LIQUID FILLED ORAL at 21:18

## 2024-11-27 RX ADMIN — OXYCODONE HYDROCHLORIDE AND ACETAMINOPHEN 1 TABLET: 5; 325 TABLET ORAL at 03:04

## 2024-11-27 RX ADMIN — Medication 1 TABLET: at 08:04

## 2024-11-27 RX ADMIN — OXYCODONE HYDROCHLORIDE AND ACETAMINOPHEN 1 TABLET: 5; 325 TABLET ORAL at 16:56

## 2024-11-27 RX ADMIN — POLYETHYLENE GLYCOL 3350 17 G: 17 POWDER, FOR SOLUTION ORAL at 11:36

## 2024-11-27 RX ADMIN — LISINOPRIL 5 MG: 10 TABLET ORAL at 08:04

## 2024-11-27 RX ADMIN — ONDANSETRON 4 MG: 2 INJECTION INTRAMUSCULAR; INTRAVENOUS at 23:11

## 2024-11-27 RX ADMIN — PREGABALIN 75 MG: 75 CAPSULE ORAL at 21:18

## 2024-11-27 RX ADMIN — INSULIN LISPRO 2 UNITS: 100 INJECTION, SOLUTION INTRAVENOUS; SUBCUTANEOUS at 17:19

## 2024-11-27 RX ADMIN — BISACODYL 10 MG: 10 SUPPOSITORY RECTAL at 11:36

## 2024-11-27 RX ADMIN — ONDANSETRON 4 MG: 2 INJECTION INTRAMUSCULAR; INTRAVENOUS at 03:08

## 2024-11-27 RX ADMIN — OXYCODONE HYDROCHLORIDE AND ACETAMINOPHEN 1 TABLET: 5; 325 TABLET ORAL at 21:27

## 2024-11-27 RX ADMIN — Medication 10 ML: at 08:05

## 2024-11-27 NOTE — PLAN OF CARE
Goal Outcome Evaluation:  Plan of Care Reviewed With: patient        Progress: no change  Outcome Evaluation: IID; IV ABX; Voiding per urinal; up x2 and a walker; Medicated for pain and nausea x1; Room air; Tele-Afib; bed check on; resting between care; safety maintained

## 2024-11-27 NOTE — PROGRESS NOTES
[facility] Medicine Services  INPATIENT PROGRESS NOTE    Patient Name: Erick Luong  Date of Admission: 11/17/2024  Today's Date: 11/27/24  Length of Stay: 8  Primary Care Physician: Del Shetty MD    Subjective   Chief Complaint: AMS/Failure to thrive    Patient's wife seen at bedside stating that he had a bad night.  She did not spend the night here but he reported to her that he was agitated at night.  Today he states that he feels congested, yesterday respiratory PCR panel was negative.  He also complains of constipation states he has not had a bowel movement in several days.  He has some wheezing will order chest x-ray.    Review of Systems   All pertinent negatives and positives are as above. All other systems have been reviewed and are negative unless otherwise stated.     Objective    Temp:  [98.1 °F (36.7 °C)-99.5 °F (37.5 °C)] 99.5 °F (37.5 °C)  Heart Rate:  [73-94] 82  Resp:  [16] 16  BP: (122-137)/(60-71) 122/71  Physical Exam  GEN: Awake, alert, interactive, in NAD, obese  HEENT: Atraumatic,  EOMI, Anicteric,   Lungs: Inspiratory wheeze on right anterior upper lung field  Heart: RRR, +S1/s2, no rub  ABD: soft, suprapubic tenderness, +BS, no guarding/rebound  Extremities: atraumatic, no cyanosis, no edema  Skin: bilateral wounds on feet  Neuro: AAOx3, no focal deficits  Psych: normal mood & affect        Results Review:  I have reviewed the labs, radiology results, and diagnostic studies.    Laboratory Data:   Results from last 7 days   Lab Units 11/27/24  0301 11/26/24  0234 11/25/24  0545   WBC 10*3/mm3 7.23 5.93 5.45   HEMOGLOBIN g/dL 11.1* 9.9* 9.4*   HEMATOCRIT % 34.3* 31.5* 29.2*   PLATELETS 10*3/mm3 260 244 233        Results from last 7 days   Lab Units 11/27/24  0301 11/26/24  0234 11/25/24  0545   SODIUM mmol/L 140 138 138   POTASSIUM mmol/L 4.7 4.2 4.4   CHLORIDE mmol/L 108* 107 107   CO2 mmol/L 22.0 22.0 21.0*   BUN mg/dL 39* 46* 46*   CREATININE  mg/dL 2.21* 2.15* 2.55*   CALCIUM mg/dL 8.8 8.1* 8.1*   GLUCOSE mg/dL 195* 131* 196*       Culture Data:   [facility]    Radiology Data:   Imaging Results (Last 24 Hours)       ** No results found for the last 24 hours. **            I have reviewed the patient's current medications.     Assessment/Plan   Assessment  Active Hospital Problems    Diagnosis     **Acute UTI (urinary tract infection)     Acute encephalopathy     Type 2 diabetes mellitus with hyperglycemia, with long-term current use of insulin     Atrial fibrillation     Chronic heart failure with preserved ejection fraction (HFpEF)     Stage 3b chronic kidney disease     CAD (coronary artery disease)     BPH without obstruction/lower urinary tract symptoms     Diabetic ulcer of left foot associated with type 2 diabetes mellitus     Sleep apnea with use of continuous positive airway pressure (CPAP)     Essential hypertension      Erick Luong is a 69 y.o. male with past medical history of coronary artery disease, CABG, Afib, diabetes mellitus insulin-dependent, diabetic foot ulcer, hypertension, sleep apnea noncompliant with CPAP, right seventh rib fracture on 10/20/2024, presents emergency room with complaints of mental status changes disorientation suprapubic pain and burning with urination.     Treatment Plan:    #  Left lower lobe pneumonia  # Hospital-acquired pneumonia  Some wheezing noted on physical exam  Chest x-ray today ordered showing left lower lobe pneumonia  Respiratory PCR panel negative  - Patient is allergic to vancomycin.  Cannot use linezolid due to concurrent use of Cymbalta and BuSpar which increased risk of serotonin syndrome.  - Will obtain MRSA PCR screen  - Starting meropenem for Pseudomonas coverage (allergic to cefepime but has tolerated meropenem in the past 3/2024)    # ESBL E. coli UTI - completed 10 days of ertapenem    # WANDER on CKD 3b   Cr 2.83 >>> 3.00 > 2.55 > 2.15 > 2.21 today   - Nephrology following  --recommend  encouraging p.o. intake  - continue to hold Bumex     # Wounds on Bilateral Feet  - Evaluated by wound care      # Hypertension - continue lisinopril  # Hyperlipidemia - continue crestor  # Diabetes mellitus - continue lantus 20, lispro 10/10/10, and SSI - pregabalin   # Depression - continue Buspar and cymbalta  # A-fib - continue Eliquis  # Constipation - docusate and pericolace.  Had a bowel movement today  # BPH - continue home flomax         Medical Decision Making  Number and Complexity of problems: 1 acute, 1 acute on chronic, others chronic  Differential Diagnosis: As above    Conditions and Status        New to me but stable.       MDM Data  External documents reviewed: Reviewed  Cardiac tracing (EKG, telemetry) interpretation: Reviewed  Radiology interpretation: Reviewed  Labs reviewed: As above  Any tests that were considered but not ordered: None     Decision rules/scores evaluated (example [id number], Wells, etc): None     Discussed with: Patient and his wife Joan     Care Planning  Shared decision making: Discussed with patient and his wife Joan  Code status and discussions: Full code    Disposition  Social Determinants of Health that impact treatment or disposition: None  I expect the patient to be discharged to home in TBD days.         Electronically signed by Germania Sheridan MD, 11/27/24, 09:41 CST.

## 2024-11-27 NOTE — THERAPY RE-EVALUATION
Patient Name: Erick Luong  : 1955    MRN: [id number]                              Today's Date: 2024       Admit Date: 2024    Visit Dx:     ICD-10-CM ICD-9-CM   1. Acute UTI (urinary tract infection)  N39.0 599.0   2. Acute renal failure superimposed on chronic kidney disease, unspecified acute renal failure type, unspecified CKD stage  N17.9 584.9    N18.9 585.9   3. Acute hyperglycemia  R73.9 790.29   4. Impaired functional mobility and activity tolerance [Z74.09]  Z74.09 V49.89     Patient Active Problem List   Diagnosis    Obesity, unspecified obesity severity, unspecified obesity type    Essential hypertension    Type 2 diabetes mellitus with hyperglycemia, with long-term current use of insulin    Nonsmoker    Anemia due to chronic kidney disease    Class 3 severe obesity due to excess calories with body mass index (BMI) of 40.0 to 44.9 in adult    Anasarca    Sleep apnea with use of continuous positive airway pressure (CPAP)    Medically noncompliant    Diabetic ulcer of left foot associated with type 2 diabetes mellitus    Diabetic polyneuropathy associated with type 2 diabetes mellitus    Spinal stenosis in cervical region    Degeneration of cervical intervertebral disc    Cervical radiculopathy    Degeneration of lumbar or lumbosacral intervertebral disc    Cervical myelopathy    Bilateral carpal tunnel syndrome    CAD (coronary artery disease)    GERD without esophagitis    BPH without obstruction/lower urinary tract symptoms    Stage 3b chronic kidney disease    Chronic diastolic heart failure    Type 2 myocardial infarction due to heart failure    Left carpal tunnel syndrome    Syncope and collapse, recurrent episodes    Poorly-controlled hypertension    Rhinovirus    Peripheral vascular disease    Chronic kidney disease (CKD), stage IV (severe)    Diabetic foot infection    Sepsis    Epigastric pain    Chronic heart failure with preserved ejection fraction (HFpEF)    Sepsis due to  methicillin resistant Staphylococcus aureus (MRSA) with encephalopathy without septic shock    Slow transit constipation    CHF exacerbation    CHF (congestive heart failure), NYHA class I, acute on chronic, combined    Rectal bleeding    Acute on chronic heart failure with preserved ejection fraction (HFpEF)    DKA, type 2, not at goal    Atrial fibrillation    E. coli UTI, ESBL    Acute UTI (urinary tract infection)    Acute encephalopathy    Type 2 diabetes mellitus with hyperglycemia, with long-term current use of insulin     Past Medical History:   Diagnosis Date    Arthritis     Autonomic disease     CAD (coronary artery disease) 02/06/2017    Cervical radiculopathy 09/16/2021    Chronic constipation with acute exaccerbation 05/10/2021    Coronary artery disease     Degeneration of cervical intervertebral disc 08/11/2021    Diabetes mellitus     Diabetic foot ulcer 08/31/2020    Diabetic polyneuropathy associated with type 2 diabetes mellitus 01/18/2021    Elevated cholesterol     Gastroesophageal reflux disease 05/13/2019    Headache     HTN (hypertension), benign 05/03/2017    Hyperlipidemia     Hypertension     Mixed hyperlipidemia 02/07/2017    Multiple lung nodules 01/26/2020    5mm, 9 mm RLL identified 1/2020, not present 10/2019.    Myocardial infarction     Osteomyelitis 01/22/2020    Osteomyelitis of fifth toe of right foot 10/07/2019    Pancreatitis     Persistent insomnia 01/20/2020    Renal disorder     Sleep apnea 02/06/2017    Sleep apnea with use of continuous positive airway pressure (CPAP)     NON-COMPLIANT    Slow transit constipation 01/16/2019    Spinal stenosis in cervical region 09/16/2021    Vitamin D deficiency 03/02/2021     Past Surgical History:   Procedure Laterality Date    ABDOMINAL SURGERY      AMPUTATION FOOT / TOE Left 10/2021    5th digit     ANTERIOR CERVICAL DISCECTOMY W/ FUSION N/A 08/05/2022    Procedure: CERVICAL DISCECTOMY ANTERIOR WITH FUSION C5-6 with possible plating  of C5-7 with neuromonitoring and 1 c-arm;  Surgeon: Karel Soliz MD;  Location: [facility] OR;  Service: Neurosurgery;  Laterality: N/A;    APPENDECTOMY      BACK SURGERY      CARDIAC CATHETERIZATION Left 02/08/2021    Procedure: Left Heart Cath w poss intervention left anatomical snuff box acess;  Surgeon: Omkar Charles DO;  Location: [facility] CATH INVASIVE LOCATION;  Service: Cardiology;  Laterality: Left;    CARDIAC SURGERY      CATARACT EXTRACTION      CERVICAL SPINE SURGERY      COLONOSCOPY N/A 01/31/2017    Normal exam repeat in 5 years    COLONOSCOPY N/A 02/11/2019    Mild acute inflammation    COLONOSCOPY N/A 04/07/2024    2 areas at 10 and 20 cm with friability ulceration 2 clips placed at 20 cm and 4 clips at 10 cm poor prep normal mucosa,mild eroisions and ulcerations in visible vessels    COLONOSCOPY N/A 7/1/2024    Procedure: COLONOSCOPY WITH ANESTHESIA;  Surgeon: Arsalan Lorenzo DO;  Location: [facility] ENDOSCOPY;  Service: Gastroenterology;  Laterality: N/A;  pre op constipation/diarrhea  post poor prep  pcp Del Shetty    COLONOSCOPY N/A 7/2/2024    Procedure: COLONOSCOPY WITH ANESTHESIA;  Surgeon: Agapito Christopher MD;  Location: [facility] ENDOSCOPY;  Service: Gastroenterology;  Laterality: N/A;  pre rectal bleeding  post poor prep  pcp Del Shetty MD    COLONOSCOPY W/ POLYPECTOMY  03/04/2014    Hyperplastic polyp    CORONARY ARTERY BYPASS GRAFT  10/2015    ENDOSCOPY  04/13/2011    Gastritis with hemorrhage    ENDOSCOPY N/A 05/05/2017    Normal exam    ENDOSCOPY N/A 02/11/2019    Gastritis    ENDOSCOPY N/A 09/01/2020    Non-erosive gastritis with hemorrhage    ENDOSCOPY N/A 02/10/2021    Esophagitis    ENDOSCOPY N/A 04/11/2024    There were esophageal mucosal changes suspicious for short-segment Low's esophagus present in the distal esophagus. The maximum longitudinal extent of these mucosal changes was 2 cm in length. Mucosa was biopsied with a cold forceps for histologyDistal  "esophagus, biopsies: Mild chronic active esophagogastritis. No evidence of intestinal metaplasia, dysplasia. Antrum, bx, Mild chronic gastritis    FOOT SURGERY Left     INCISION AND DRAINAGE OF WOUND Left 09/2007    spider bite      General Information       Row Name 11/27/24 1303          Physical Therapy Time and Intention    Document Type re-evaluation  see inital eval, re-eval for admission date  -     Mode of Treatment physical therapy  -       Row Name 11/27/24 1303          General Information    Patient Profile Reviewed yes  -     Existing Precautions/Restrictions fall;non-weight bearing;other (see comments)  pending CAM boot  -       Row Name 11/27/24 1303          Cognition    Orientation Status (Cognition) oriented x 4  -       Row Name 11/27/24 1303          Safety Issues/Impairments Affecting Functional Mobility    Impairments Affecting Function (Mobility) endurance/activity tolerance;pain;strength  -               User Key  (r) = Recorded By, (t) = Taken By, (c) = Cosigned By      Initials Name Provider Type    Ronal Cevallos, PT DPT Physical Therapist                   Mobility       Row Name 11/27/24 1303          Bed Mobility    Bed Mobility supine-sit  -     Supine-Sit [place] (Bed Mobility) minimum assist (75% patient effort);1 person assist  -     Assistive Device (Bed Mobility) head of bed elevated;repositioning sheet  -     Comment, (Bed Mobility) pt required more assist today as he feels he has a \"cold\" and cannot perform things as easy  -       Row Name 11/27/24 1303          Bed-Chair Transfer    Bed-Chair [place] (Transfers) contact guard  -     Assistive Device (Bed-Chair Transfers) walker, front-wheeled  -       Row Name 11/27/24 1303          Sit-Stand Transfer    Sit-Stand [place] (Transfers) contact guard  -     Assistive Device (Sit-Stand Transfers) walker, front-wheeled  -       Row Name 11/27/24 1303          Gait/Stairs (Locomotion)    " [place] Level (Gait) contact guard;verbal cues  -     Assistive Device (Gait) walker, front-wheeled  -     Distance in Feet (Gait) 3  -       Row Name 11/27/24 1303          Mobility    Extremity Weight-bearing Status left lower extremity  -     Left Lower Extremity (Weight-bearing Status) non weight-bearing (NWB);other (see comments)  WB if CAM boot present  -               User Key  (r) = Recorded By, (t) = Taken By, (c) = Cosigned By      Initials Name Provider Type    Ronal Cevallos PT DPT Physical Therapist                   Obj/Interventions       Row Name 11/27/24 1303          Range of Motion Comprehensive    General Range of Motion bilateral lower extremity ROM WFL  -       Row Name 11/27/24 1303          Strength Comprehensive (MMT)    General Manual Muscle Testing (MMT) Assessment no strength deficits identified  -     Comment, General Manual Muscle Testing (MMT) Assessment no formal testing, remained WFL as seen in t/f and gait  -       Row Name 11/27/24 1303          Balance    Balance Assessment sitting static balance;sitting dynamic balance;standing static balance;standing dynamic balance  -     Static Sitting Balance standby assist  -     Dynamic Sitting Balance standby assist  -     Position, Sitting Balance unsupported;sitting edge of bed  -     Static Standing Balance contact guard  -     Dynamic Standing Balance contact guard  -     Position/Device Used, Standing Balance supported;walker, rolling  -     Balance Interventions sitting;standing;sit to stand;supported;static;dynamic  -               User Key  (r) = Recorded By, (t) = Taken By, (c) = Cosigned By      Initials Name Provider Type    Ronal Cevallos PT DPT Physical Therapist                   Goals/Plan       Row Name 11/27/24 1303          Bed Mobility Goal 1 (PT)    Activity/Assistive Device (Bed Mobility Goal 1, PT) bed mobility activities, all  -     [place] Level/Cues Needed (Bed  Mobility Goal 1, PT) independent  -AJ     Time Frame (Bed Mobility Goal 1, PT) 10 days  -AJ     Progress/Outcomes (Bed Mobility Goal 1, PT) goal ongoing;continuing progress toward goal;progress slower than expected  -AJ       Row Name 11/27/24 1308          Transfer Goal 1 (PT)    Activity/Assistive Device (Transfer Goal 1, PT) sit-to-stand/stand-to-sit;bed-to-chair/chair-to-bed;other (see comments)  -AJ     [place] Level/Cues Needed (Transfer Goal 1, PT) independent  -AJ     Time Frame (Transfer Goal 1, PT) long term goal (LTG);10 days  -AJ     Progress/Outcome (Transfer Goal 1, PT) goal revised this date  -AJ       Row Name 11/27/24 1307          Gait Training Goal 1 (PT)    Activity/Assistive Device (Gait Training Goal 1, PT) gait (walking locomotion);assistive device use;decrease fall risk;improve balance and speed;increase endurance/gait distance;increase energy conservation;maintain weight-bearing status;walker, rolling;other (see comments)  -AJ     [place] Level (Gait Training Goal 1, PT) contact guard required  -AJ     Distance (Gait Training Goal 1, PT) 50 ft with no pain  -AJ     Time Frame (Gait Training Goal 1, PT) long term goal (LTG);10 days  -AJ     Progress/Outcome (Gait Training Goal 1, PT) goal revised this date  -AJ       Row Name 11/27/24 130          Balance Goal 1 (PT)    Activity/Assistive Device (Balance Goal) sit to stand dynamic balance;standing static balance;standing dynamic balance;walker, rolling  -AJ     [place] Level/Cues Needed (Balance Goal 1, PT) independent  -AJ     Time Frame (Balance Goal 1, PT) long-term goal (LTG);other (see comments)  -AJ     Progress/Outcomes (Balance Goal 1, PT) goal revised this date  -AJ       Row Name 11/27/24 1300          Therapy Assessment/Plan (PT)    Planned Therapy Interventions (PT) balance training;bed mobility training;gait training;home exercise program;patient/family education;postural re-education;transfer training;ROM (range  "of motion);stretching;strengthening  -               User Key  (r) = Recorded By, (t) = Taken By, (c) = Cosigned By      Initials Name Provider Type    AJ Ronal Barry, PT DPT Physical Therapist                   Clinical Impression       Row Name 11/27/24 1303          Pain    Pretreatment Pain Rating 5/10  -     Posttreatment Pain Rating 5/10  -     Pain Location abdomen;back;flank  -     Pain Side/Orientation generalized  -     Pain Management Interventions nursing notified;exercise or physical activity utilized  -     Response to Pain Interventions no change per patient report  -       Row Name 11/27/24 1303          Plan of Care Review    Plan of Care Reviewed With patient  -     Progress no change  -     Outcome Evaluation PT re-eval/cert completed. Pt in low fowlers psotion and asleep upon arrival, AOx4 complaints of back, flank and abominal pain. Pt agreeable to session and moving OOB, required Min A to reach EOB. Pt feels he has a \"cold\" and stated he feels weak today. Upon sitting EOB, pt family brought lunch and pt attempted to refuse further treatment but was agreeable to t/f to recliner. Pt required Dep A to don Cam boot, performed sit<>stand and t/f with CGA and FWW, left in recliner with needs in reach and RN notified. Pt will continue to benefit from skilled PT services to reach goals. POC ongoing. Recommend home with assist and HH when medically stable.  -       Row Name 11/27/24 130          Therapy Assessment/Plan (PT)    Patient/Family Therapy Goals Statement (PT) pain control and getting home  -     Rehab Potential (PT) good  -     Criteria for Skilled Interventions Met (PT) yes;meets criteria;skilled treatment is necessary  -     Therapy Frequency (PT) 2 times/day  -AJ     Predicted Duration of Therapy Intervention (PT) until d/c  -       Row Name 11/27/24 1303          Vital Signs    Pre Patient Position Supine  -AJ     Intra Patient Position Standing  -AJ     " Post Patient Position Sitting  -       Row Name 11/27/24 1303          Positioning and Restraints    Pre-Treatment Position in bed  -     Post Treatment Position chair  -     In Chair notified nsg;sitting;call light within reach;with family/caregiver  -               User Key  (r) = Recorded By, (t) = Taken By, (c) = Cosigned By      Initials Name Provider Type    Ronal Cevallos, PT DPT Physical Therapist                   Outcome Measures       Row Name 11/27/24 1303 11/27/24 0804       How much help from another person do you currently need...    Turning from your back to your side while in flat bed without using bedrails? 3  -AJ 4  -CF    Moving from lying on back to sitting on the side of a flat bed without bedrails? 3  -AJ 4  -CF    Moving to and from a bed to a chair (including a wheelchair)? 3  -AJ 4  -CF    Standing up from a chair using your arms (e.g., wheelchair, bedside chair)? 4  -AJ 3  -CF    Climbing 3-5 steps with a railing? 2  - 3  -CF    To walk in hospital room? 3  - 3  -CF    AM-PAC 6 Clicks Score (PT) 18  -AJ 21  -CF    Highest Level of Mobility Goal 6 --> Walk 10 steps or more  - 6 --> Walk 10 steps or more  -      Row Name 11/27/24 1303          Functional Assessment    Outcome Measure Options AM-PAC 6 Clicks Basic Mobility (PT)  -               User Key  (r) = Recorded By, (t) = Taken By, (c) = Cosigned By      Initials Name Provider Type     Nusrat Garay RN Registered Nurse    Ronal Cevallos, PT DPT Physical Therapist                                 Physical Therapy Education       Title: PT OT SLP Therapies (In Progress)       Topic: Physical Therapy (Done)       Point: Mobility training (Done)       Learning Progress Summary            Patient Acceptance, E, VU by YANG at 11/27/2024 1343    Comment: continued role of PT, benefits of OOB activity, goal update and progress    Acceptance, E, NR by AE at 11/21/2024 0850    Comment: NWB L LE and need for CAM boot     Acceptance, E, VU by JACIEL at 11/19/2024 0958    Comment: limited gait until boot for RLE    Acceptance, E,TB,D, VU,NR by NIKO at 11/18/2024 1128    Comment: education re: purpose of PT/importance of activity, safety/falls prevention, NWB L LE due to open wounds, improved tech w/ tfers, positioning w/ L LE elevated                      Point: Home exercise program (Done)       Learning Progress Summary            Patient Acceptance, E, VU by YANG at 11/27/2024 1343    Comment: continued role of PT, benefits of OOB activity, goal update and progress    Acceptance, E, NR by AE at 11/21/2024 0850    Comment: NWB L LE and need for CAM boot                      Point: Precautions (Done)       Learning Progress Summary            Patient Acceptance, E, VU by YANG at 11/27/2024 1343    Comment: continued role of PT, benefits of OOB activity, goal update and progress    Acceptance, E,TB,D, VU,NR by NIKO at 11/18/2024 1128    Comment: education re: purpose of PT/importance of activity, safety/falls prevention, NWB L LE due to open wounds, improved tech w/ tfers, positioning w/ L LE elevated                                      User Key       Initials Effective Dates Name Provider Type Discipline    AE 02/03/23 -  Hansa Arambula, PTA Physical Therapist Assistant PT    JACIEL 02/03/23 -  Sabas Maharaj, PTA Physical Therapist Assistant PT     08/02/18 -  Melly Mustafa, PT Physical Therapist PT     08/15/24 -  Ronal Barry, URSZULA DPT Physical Therapist PT                  PT Recommendation and Plan  Planned Therapy Interventions (PT): balance training, bed mobility training, gait training, home exercise program, patient/family education, postural re-education, transfer training, ROM (range of motion), stretching, strengthening  Progress: no change  Outcome Evaluation: PT re-eval/cert completed. Pt in low fowlers psotion and asleep upon arrival, AOx4 complaints of back, flank and abominal pain. Pt agreeable to session and moving  "OOB, required Min A to reach EOB. Pt feels he has a \"cold\" and stated he feels weak today. Upon sitting EOB, pt family brought lunch and pt attempted to refuse further treatment but was agreeable to t/f to recliner. Pt required Dep A to don Cam boot, performed sit<>stand and t/f with CGA and FWW, left in recliner with needs in reach and RN notified. Pt will continue to benefit from skilled PT services to reach goals. POC ongoing. Recommend home with assist and HH when medically stable.     Time Calculation:         PT Charges       Row Name 11/27/24 1303             Time Calculation    Start Time 1303  -AJ      Stop Time 1330  -AJ      Time Calculation (min) 27 min  -AJ      PT Received On 11/27/24  -AJ      PT Goal Re-Cert Due Date 12/07/24  -AJ         Untimed Charges    PT Eval/Re-eval Minutes 27  -AJ         Total Minutes    Untimed Charges Total Minutes 27  -AJ       Total Minutes 27  -AJ                User Key  (r) = Recorded By, (t) = Taken By, (c) = Cosigned By      Initials Name Provider Type    Ronal Cevallos PT DPT Physical Therapist                  Therapy Charges for Today       Code Description Service Date Service Provider Modifiers Qty    [id number] HC PT RE-EVAL ESTABLISHED PLAN 2 11/27/2024 Ronal Barry PT DPT GP 1            PT G-Codes  Outcome Measure Options: AM-PAC 6 Clicks Basic Mobility (PT)  AM-PAC 6 Clicks Score (PT): 18  AM-PAC 6 Clicks Score (OT): 20  PT Discharge Summary  Anticipated Discharge Disposition (PT): home with assist, home with home health    Ronal Barry PT DPT  11/27/2024    "

## 2024-11-27 NOTE — PLAN OF CARE
"Goal Outcome Evaluation:  Plan of Care Reviewed With: patient        Progress: no change  Outcome Evaluation: PT re-eval/cert completed. Pt in low fowlers psotion and asleep upon arrival, AOx4 complaints of back, flank and abominal pain. Pt agreeable to session and moving OOB, required Min A to reach EOB. Pt feels he has a \"cold\" and stated he feels weak today. Upon sitting EOB, pt family brought lunch and pt attempted to refuse further treatment but was agreeable to t/f to recliner. Pt required Dep A to don Cam boot, performed sit<>stand and t/f with CGA and FWW, left in recliner with needs in reach and RN notified. Pt will continue to benefit from skilled PT services to reach goals. POC ongoing. Recommend home with assist and HH when medically stable.    Anticipated Discharge Disposition (PT): home with assist, home with home health                        " EKG in ED without ST elevation  Appreciate cards inputs.    Suspect demand ischemia  Troponin is trending dwon.  Plavix.  Held asa due love

## 2024-11-27 NOTE — CASE MANAGEMENT/SOCIAL WORK
Continued Stay Note  MAYCOL Lobo     Patient Name: Erick Luong  MRN: [id number]  Today's Date: 11/27/2024    Admit Date: 11/17/2024    Plan: Home   Discharge Plan       Row Name 11/27/24 1357       Plan    Plan Home    Patient/Family in Agreement with Plan yes    Plan Comments Plan is for pt to return home with his spouse at d/c. Therapy recommends possible home health. Will follow.                   Discharge Codes    No documentation.                 Expected Discharge Date and Time       Expected Discharge Date Expected Discharge Time    Nov 26, 2024               MEJIA Carreno

## 2024-11-27 NOTE — PLAN OF CARE
Goal Outcome Evaluation:              Outcome Evaluation: Patient c/o pain. see mar. Up to chair with pt. Voiding. PRN bowel meds given see Mar. Wife at beside.

## 2024-11-27 NOTE — PROGRESS NOTES
Nephrology ([facility]) Progress Note      Patient:  Erick Luong  YOB: 1955  Date of Service: 11/27/2024  MRN: [id number]   Acct: [id number]   Primary Care Physician: Del Shetty MD  Advance Directive:   Code Status and Medical Interventions: CPR (Attempt to Resuscitate); Full Support   Ordered at: 11/18/24 0159     Code Status (Patient has no pulse and is not breathing):    CPR (Attempt to Resuscitate)     Medical Interventions (Patient has pulse or is breathing):    Full Support     Admit Date: 11/17/2024       Hospital Day: 8  Referring Provider: Garrett Alicia,*      Patient personally seen and examined.  Complete chart including Consults, Notes, Operative Reports, Labs, Cardiology, and Radiology studies reviewed as able.        Subjective:  Erick Luong is a 69 y.o. male for whom we were consulted for evaluation and treatment of acute kidney injury. Baseline chronic kidney disease stage 3b, baseline creatinine approximately 1.5-1.8.  Follows in our office. History of uncontrolled diabetes, hypertension, coronary artery disease, diabetic foot ulcer, obesity. Admitted with cystitis, foot wound and failure to thrive. Minimal PO intake recently. Had no specific complaints at time of nephrology initial exam. Hospital course remarkable for administration of IV fluids and reduction of Bumex dose. Bumex was stopped entirely afternoon of 11/21. Has been receiving IV fluids intermittently since then and renal function has been fluctuating.     Today is awake and alert. Having some non-productive cough and rhinorrhea. No overnight issues.  Urine output nonoliguric     Allergies:  Cefepime, Bactrim [sulfamethoxazole-trimethoprim], Vancomycin, Zolpidem, and Metronidazole    Home Meds:  Medications Prior to Admission   Medication Sig Dispense Refill Last Dose/Taking    apixaban (Eliquis) 5 MG tablet tablet Take 1 tablet by mouth 2 (Two) Times a Day. 60 tablet 0 Taking     ascorbic acid (VITAMIN C) 1000 MG tablet Take 1 tablet by mouth Daily. 30 tablet 3 Taking    bumetanide (BUMEX) 2 MG tablet Take 1 tablet by mouth 2 (Two) Times a Day. 6 tablet 3 Taking    busPIRone (BUSPAR) 10 MG tablet Take 1 tablet by mouth 3 (Three) Times a Day As Needed. (Patient taking differently: Take 1 tablet by mouth 3 (Three) Times a Day As Needed (anxiety).) 270 tablet 4 Taking Differently    donepezil (ARICEPT) 10 MG tablet Take 1 tablet by mouth every night at bedtime. 30 tablet 0 Taking    DULoxetine (CYMBALTA) 60 MG capsule Take 1 capsule by mouth Daily. 30 capsule 0 Taking    famotidine (PEPCID) 20 MG tablet Take 1 tablet by mouth 2 (Two) Times a Day. 60 tablet 0 Taking    insulin glargine (Lantus) 100 UNIT/ML injection Inject 35 Units under the skin into the appropriate area as directed every night at bedtime. 10 mL 0 Taking    lisinopril (PRINIVIL,ZESTRIL) 5 MG tablet Take 1 tablet by mouth Daily. 30 tablet 0 Taking    melatonin 3 MG tablet Take 2 tablets by mouth At Night As Needed for Sleep. 30 tablet 0 Taking As Needed    midodrine (PROAMATINE) 2.5 MG tablet Take 1 tablet by mouth 2 (Two) Times a Day. 60 tablet 5 Taking    oxyCODONE (ROXICODONE) 10 MG tablet Take 1 tablet by mouth 2 (Two) Times a Day As Needed. (Patient taking differently: Take 1 tablet by mouth 2 (Two) Times a Day As Needed for Moderate Pain or Severe Pain. *must last 30 days*) 55 tablet 0 Taking Differently    pantoprazole (PROTONIX) 40 MG EC tablet Take 1 tablet by mouth 2 (Two) Times a Day. 60 tablet 0 Taking    polyethylene glycol (MIRALAX) 17 GM/SCOOP powder Take 17 g by mouth Daily As Needed (constipation).   Taking As Needed    potassium chloride ER (K-TAB) 20 MEQ tablet controlled-release ER tablet Take 1 tablet by mouth Daily. 30 tablet 0 Taking    rosuvastatin (CRESTOR) 10 MG tablet Take 1 tablet by mouth Daily. 30 tablet 0 Taking    sennosides-docusate (PERICOLACE) 8.6-50 MG per tablet Take 1 tablet by mouth  Every Night. Obtain OTC   Taking    tamsulosin (FLOMAX) 0.4 MG capsule 24 hr capsule Take 1 capsule by mouth Daily. 90 capsule 4 Taking    Insulin Lispro (humaLOG) 100 UNIT/ML injection Inject 2-12 Units under the skin into the appropriate area as directed 4 (Four) Times a Day Before Meals & at Bedtime. Inject subcutaneously four times a day per sliding scale:  0-150 = 0 units; 151-200 = 2u; 201-250 = 4u; 251-300 = 6u; 301-350 = 8u; 351-400 = 10u; 401+ = 12u       nitroglycerin (NITROSTAT) 0.4 MG SL tablet Place 1 tablet under the tongue Every 5 (Five) Minutes As Needed for Chest Pain. Take no more than 3 doses in 15 minutes.       pregabalin (LYRICA) 100 MG capsule Take 1 capsule by mouth Daily.       pregabalin (LYRICA) 100 MG capsule Take 2 capsules by mouth Every Night.       sodium hypochlorite (DAKIN'S 1/4 STRENGTH) 0.125 % solution topical solution 0.125% Apply  topically to the appropriate area as directed 2 (Two) Times a Day. 473 mL 5        Medicines:  Current Facility-Administered Medications   Medication Dose Route Frequency Provider Last Rate Last Admin    acetaminophen (TYLENOL) tablet 650 mg  650 mg Oral Q4H PRN Garrett Alicia MD        Or    acetaminophen (TYLENOL) 160 MG/5ML oral solution 650 mg  650 mg Oral Q4H PRN Garrett Alicia MD        Or    acetaminophen (TYLENOL) suppository 650 mg  650 mg Rectal Q4H PRN Garrett Alicia MD        apixaban (ELIQUIS) tablet 5 mg  5 mg Oral BID Garrett Alicia MD   5 mg at 11/27/24 0803    [Held by provider] ascorbic acid (VITAMIN C) tablet 500 mg  500 mg Oral BID Telly Rodriguez MD   500 mg at 11/20/24 0837    sennosides-docusate (PERICOLACE) 8.6-50 MG per tablet 2 tablet  2 tablet Oral BID PRN Garrett Alicia MD        And    polyethylene glycol (MIRALAX) packet 17 g  17 g Oral Daily PRN Garrett Alicia MD   17 g at 11/26/24 0831    And    bisacodyl (DULCOLAX) EC tablet 5 mg  5 mg Oral Daily PRN Ravin  Garrett Cabrera MD   5 mg at 11/22/24 0924    And    bisacodyl (DULCOLAX) suppository 10 mg  10 mg Rectal Daily PRN Garrett Alicia MD        [Held by provider] bumetanide (BUMEX) tablet 1 mg  1 mg Oral BID Telly Rodriguez MD   1 mg at 11/21/24 0914    busPIRone (BUSPAR) tablet 10 mg  10 mg Oral TID PRN Garrett Alicia MD   10 mg at 11/20/24 2055    calcium carbonate (TUMS) chewable tablet 500 mg (200 mg elemental)  2 tablet Oral TID PRN Telly Rodriguez MD   2 tablet at 11/20/24 1459    Calcium Replacement - Follow Nurse / BPA Driven Protocol   Not Applicable PRN Telly Rodriguez MD        dextrose (D50W) (25 g/50 mL) IV injection 25 g  25 g Intravenous Q15 Min PRN Garrett Alicia MD        dextrose (GLUTOSE) oral gel 15 g  15 g Oral Q15 Min PRN Garrett Alicia MD   15 g at 11/19/24 0040    docusate sodium (COLACE) capsule 100 mg  100 mg Oral BID Telly Rodriguez MD   100 mg at 11/27/24 0804    [Held by provider] donepezil (ARICEPT) tablet 10 mg  10 mg Oral Nightly Garrett Alicia MD   10 mg at 11/18/24 0219    DULoxetine (CYMBALTA) DR capsule 30 mg  30 mg Oral Daily Telly Rodriguez MD   30 mg at 11/27/24 0804    ertapenem (INVanz) 1,000 mg in sodium chloride 0.9 % 100 mL MBP  1,000 mg Intravenous Q24H Germania Sheridan  mL/hr at 11/26/24 2053 1,000 mg at 11/26/24 2053    famotidine (PEPCID) injection 20 mg  20 mg Intravenous Daily Telly Rodriguez MD   20 mg at 11/27/24 0806    glucagon (GLUCAGEN) injection 1 mg  1 mg Intramuscular Q15 Min PRN Garrett Alicia MD        insulin glargine (LANTUS, SEMGLEE) injection 20 Units  20 Units Subcutaneous Nightly Garrett Alicia MD   20 Units at 11/18/24 2143    Insulin Lispro (humaLOG) injection 10 Units  10 Units Subcutaneous TID With Meals Garrett Alicia MD   10 Units at 11/24/24 1705    Insulin Lispro (humaLOG) injection 2-9 Units  2-9 Units Subcutaneous 4x Daily AC & at Bedtime Garrett Alicia  MD Rick   4 Units at 11/26/24 2059    lisinopril (PRINIVIL,ZESTRIL) tablet 5 mg  5 mg Oral Daily Garrett Alicia MD   5 mg at 11/27/24 0804    Magnesium Standard Dose Replacement - Follow Nurse / BPA Driven Protocol   Not Applicable Telly Rios MD        multivitamin with minerals 1 tablet  1 tablet Oral Daily Telly Rodriguez MD   1 tablet at 11/27/24 0804    nitroglycerin (NITROSTAT) SL tablet 0.4 mg  0.4 mg Sublingual Q5 Min PRN Garrett Alicia MD        ondansetron (ZOFRAN) injection 4 mg  4 mg Intravenous Q6H PRN Garrett Alicia MD   4 mg at 11/27/24 0308    oxyCODONE-acetaminophen (PERCOCET) 5-325 MG per tablet 1 tablet  1 tablet Oral Q4H PRN Germania Sheridan MD   1 tablet at 11/27/24 0804    Phosphorus Replacement - Follow Nurse / BPA Driven Protocol   Not Applicable PRN Telly Rodriguez MD        Potassium Replacement - Follow Nurse / BPA Driven Protocol   Not Applicable PRN Telly Rodriguez MD        pregabalin (LYRICA) capsule 75 mg  75 mg Oral Nightly Telly Rodriguez MD   75 mg at 11/26/24 2053    prochlorperazine (COMPAZINE) injection 5 mg  5 mg Intravenous Q6H PRN Telly Rodriguez MD   5 mg at 11/20/24 1456    rosuvastatin (CRESTOR) tablet 10 mg  10 mg Oral Daily Garrett Alicia MD   10 mg at 11/27/24 0804    sennosides-docusate (PERICOLACE) 8.6-50 MG per tablet 1 tablet  1 tablet Oral Nightly Garrett Alicia MD   1 tablet at 11/26/24 2053    sodium chloride 0.9 % flush 10 mL  10 mL Intravenous Q12H Garrett Alicia MD   10 mL at 11/27/24 0805    sodium chloride 0.9 % flush 10 mL  10 mL Intravenous PRN Garrett Alicia MD        sodium chloride 0.9 % infusion 40 mL  40 mL Intravenous PRN Garrett Alicia MD        tamsulosin (FLOMAX) 24 hr capsule 0.4 mg  0.4 mg Oral Daily Garrett Alicia MD   0.4 mg at 11/27/24 0804    [Held by provider] zinc sulfate (ZINCATE) capsule 220 mg  220 mg Oral Daily Telly Rodriguez MD    220 mg at 11/20/24 0836       Past Medical History:  Past Medical History:   Diagnosis Date    Arthritis     Autonomic disease     CAD (coronary artery disease) 02/06/2017    Cervical radiculopathy 09/16/2021    Chronic constipation with acute exaccerbation 05/10/2021    Coronary artery disease     Degeneration of cervical intervertebral disc 08/11/2021    Diabetes mellitus     Diabetic foot ulcer 08/31/2020    Diabetic polyneuropathy associated with type 2 diabetes mellitus 01/18/2021    Elevated cholesterol     Gastroesophageal reflux disease 05/13/2019    Headache     HTN (hypertension), benign 05/03/2017    Hyperlipidemia     Hypertension     Mixed hyperlipidemia 02/07/2017    Multiple lung nodules 01/26/2020    5mm, 9 mm RLL identified 1/2020, not present 10/2019.    Myocardial infarction     Osteomyelitis 01/22/2020    Osteomyelitis of fifth toe of right foot 10/07/2019    Pancreatitis     Persistent insomnia 01/20/2020    Renal disorder     Sleep apnea 02/06/2017    Sleep apnea with use of continuous positive airway pressure (CPAP)     NON-COMPLIANT    Slow transit constipation 01/16/2019    Spinal stenosis in cervical region 09/16/2021    Vitamin D deficiency 03/02/2021       Past Surgical History:  Past Surgical History:   Procedure Laterality Date    ABDOMINAL SURGERY      AMPUTATION FOOT / TOE Left 10/2021    5th digit     ANTERIOR CERVICAL DISCECTOMY W/ FUSION N/A 08/05/2022    Procedure: CERVICAL DISCECTOMY ANTERIOR WITH FUSION C5-6 with possible plating of C5-7 with neuromonitoring and 1 c-arm;  Surgeon: Karel Soliz MD;  Location: [facility] OR;  Service: Neurosurgery;  Laterality: N/A;    APPENDECTOMY      BACK SURGERY      CARDIAC CATHETERIZATION Left 02/08/2021    Procedure: Left Heart Cath w poss intervention left anatomical snuff box acess;  Surgeon: Omkar Charles DO;  Location:  [facility]ATH [facility]AS[facility]ATION;  Service: Cardiology;  Laterality: Left;    CARDIAC SURGERY      CATARACT  EXTRACTION      CERVICAL SPINE SURGERY      COLONOSCOPY N/A 01/31/2017    Normal exam repeat in 5 years    COLONOSCOPY N/A 02/11/2019    Mild acute inflammation    COLONOSCOPY N/A 04/07/2024    2 areas at 10 and 20 cm with friability ulceration 2 clips placed at 20 cm and 4 clips at 10 cm poor prep normal mucosa,mild eroisions and ulcerations in visible vessels    COLONOSCOPY N/A 7/1/2024    Procedure: COLONOSCOPY WITH ANESTHESIA;  Surgeon: Arsalan Lorenzo DO;  Location: [facility] ENDOSCOPY;  Service: Gastroenterology;  Laterality: N/A;  pre op constipation/diarrhea  post poor prep  pcp Del Shetty    COLONOSCOPY N/A 7/2/2024    Procedure: COLONOSCOPY WITH ANESTHESIA;  Surgeon: Agapito Christopher MD;  Location: [facility] ENDOSCOPY;  Service: Gastroenterology;  Laterality: N/A;  pre rectal bleeding  post poor prep  pcp Del Shetty MD    COLONOSCOPY W/ POLYPECTOMY  03/04/2014    Hyperplastic polyp    CORONARY ARTERY BYPASS GRAFT  10/2015    ENDOSCOPY  04/13/2011    Gastritis with hemorrhage    ENDOSCOPY N/A 05/05/2017    Normal exam    ENDOSCOPY N/A 02/11/2019    Gastritis    ENDOSCOPY N/A 09/01/2020    Non-erosive gastritis with hemorrhage    ENDOSCOPY N/A 02/10/2021    Esophagitis    ENDOSCOPY N/A 04/11/2024    There were esophageal mucosal changes suspicious for short-segment Low's esophagus present in the distal esophagus. The maximum longitudinal extent of these mucosal changes was 2 cm in length. Mucosa was biopsied with a cold forceps for histologyDistal esophagus, biopsies: Mild chronic active esophagogastritis. No evidence of intestinal metaplasia, dysplasia. Antrum, bx, Mild chronic gastritis    FOOT SURGERY Left     INCISION AND DRAINAGE OF WOUND Left 09/2007    spider bite       Family History  Family History   Problem Relation Age of Onset    Colon cancer Father     Heart disease Father     Colon cancer Sister     Colon polyps Sister     Alzheimer's disease Mother     Coronary artery disease  Sister     Coronary artery disease Sister        Social History  Social History     Socioeconomic History    Marital status:    Tobacco Use    Smoking status: Former     Current packs/day: 0.00     Types: Cigarettes     Quit date:      Years since quittin.9    Smokeless tobacco: Never    Tobacco comments:     smoked in highschool   Vaping Use    Vaping status: Never Used   Substance and Sexual Activity    Alcohol use: No    Drug use: No    Sexual activity: Defer       Review of Systems:  History obtained from chart review and the patient  General ROS: No fever or chills  Respiratory ROS: No cough, shortness of breath, wheezing  Cardiovascular ROS: No chest pain or palpitations  Gastrointestinal ROS: No abdominal pain or melena  Genito-Urinary ROS: No dysuria or hematuria  Psych ROS: No anxiety and depression  14 point ROS reviewed with the patient and negative except as noted above and in the HPI unless unable to obtain.    Objective:  Patient Vitals for the past 24 hrs:   BP Temp Temp src Pulse Resp SpO2   24 0721 122/71 99.5 °F (37.5 °C) Axillary 82 16 94 %   24 0425 127/64 99 °F (37.2 °C) Oral 94 16 95 %   24 2030 137/60 98.5 °F (36.9 °C) Oral 78 16 96 %   24 1608 132/68 98.1 °F (36.7 °C) Oral 73 16 --   24 1124 124/64 98.4 °F (36.9 °C) Oral 91 16 --       Intake/Output Summary (Last 24 hours) at 2024 1039  Last data filed at 2024 0721  Gross per 24 hour   Intake 240 ml   Output 1450 ml   Net -1210 ml     General: awake/alert   Chest:  clear to auscultation bilaterally without respiratory distress  CVS: regular rate and rhythm  Abdominal: soft, nontender, positive bowel sounds  Extremities: no cyanosis or edema  Skin: warm and dry without rash      Labs:  Results from last 7 days   Lab Units 24  0301 24  0234 24  0545   WBC 10*3/mm3 7.23 5.93 5.45   HEMOGLOBIN g/dL 11.1* 9.9* 9.4*   HEMATOCRIT % 34.3* 31.5* 29.2*   PLATELETS 10*3/mm3 260  244 233         Results from last 7 days   Lab Units 11/27/24  0301 11/26/24  0234 11/25/24  0545   SODIUM mmol/L 140 138 138   POTASSIUM mmol/L 4.7 4.2 4.4   CHLORIDE mmol/L 108* 107 107   CO2 mmol/L 22.0 22.0 21.0*   BUN mg/dL 39* 46* 46*   CREATININE mg/dL 2.21* 2.15* 2.55*   CALCIUM mg/dL 8.8 8.1* 8.1*   EGFR mL/min/1.73 31.7* 32.7* 26.7*   GLUCOSE mg/dL 195* 131* 196*       Radiology:   Imaging Results (Last 72 Hours)       ** No results found for the last 72 hours. **            Culture:  Blood Culture   Date Value Ref Range Status   11/18/2024 No growth at 3 days  Preliminary   11/17/2024 No growth at 3 days  Preliminary     Urine Culture   Date Value Ref Range Status   11/17/2024 >100,000 CFU/mL Escherichia coli (A)  Preliminary         Assessment    Acute kidney injury--improving  Baseline chronic kidney disease stage 3b  Hypertension  Uncontrolled type 2 diabetes (A1c 13.4%)  Anemia of CKD  Metabolic acidosis--improved  Acute cystitis  Obesity     Plan:  Renal function stable overnight  Monitor labs      Stewart Alvarez, APRN  11/27/2024  10:39 CST

## 2024-11-28 LAB
ANION GAP SERPL CALCULATED.3IONS-SCNC: 13 MMOL/L (ref 5–15)
BUN SERPL-MCNC: 35 MG/DL (ref 8–23)
BUN/CREAT SERPL: 15.7 (ref 7–25)
CALCIUM SPEC-SCNC: 8.4 MG/DL (ref 8.6–10.5)
CHLORIDE SERPL-SCNC: 104 MMOL/L (ref 98–107)
CO2 SERPL-SCNC: 20 MMOL/L (ref 22–29)
CREAT SERPL-MCNC: 2.23 MG/DL (ref 0.76–1.27)
DEPRECATED RDW RBC AUTO: 50.9 FL (ref 37–54)
EGFRCR SERPLBLD CKD-EPI 2021: 31.3 ML/MIN/1.73
ERYTHROCYTE [DISTWIDTH] IN BLOOD BY AUTOMATED COUNT: 15.9 % (ref 12.3–15.4)
GLUCOSE BLDC GLUCOMTR-MCNC: 112 MG/DL (ref 70–130)
GLUCOSE BLDC GLUCOMTR-MCNC: 133 MG/DL (ref 70–130)
GLUCOSE BLDC GLUCOMTR-MCNC: 188 MG/DL (ref 70–130)
GLUCOSE BLDC GLUCOMTR-MCNC: 248 MG/DL (ref 70–130)
GLUCOSE SERPL-MCNC: 125 MG/DL (ref 65–99)
HCT VFR BLD AUTO: 32.4 % (ref 37.5–51)
HGB BLD-MCNC: 10.5 G/DL (ref 13–17.7)
MCH RBC QN AUTO: 28.5 PG (ref 26.6–33)
MCHC RBC AUTO-ENTMCNC: 32.4 G/DL (ref 31.5–35.7)
MCV RBC AUTO: 87.8 FL (ref 79–97)
PLATELET # BLD AUTO: 197 10*3/MM3 (ref 140–450)
PMV BLD AUTO: 11.7 FL (ref 6–12)
POTASSIUM SERPL-SCNC: 4.3 MMOL/L (ref 3.5–5.2)
RBC # BLD AUTO: 3.69 10*6/MM3 (ref 4.14–5.8)
SODIUM SERPL-SCNC: 137 MMOL/L (ref 136–145)
WBC NRBC COR # BLD AUTO: 5.32 10*3/MM3 (ref 3.4–10.8)

## 2024-11-28 PROCEDURE — 80048 BASIC METABOLIC PNL TOTAL CA: CPT | Performed by: FAMILY MEDICINE

## 2024-11-28 PROCEDURE — 97116 GAIT TRAINING THERAPY: CPT

## 2024-11-28 PROCEDURE — 63710000001 INSULIN LISPRO (HUMAN) PER 5 UNITS: Performed by: FAMILY MEDICINE

## 2024-11-28 PROCEDURE — 25010000002 ONDANSETRON PER 1 MG: Performed by: FAMILY MEDICINE

## 2024-11-28 PROCEDURE — 25010000002 MEROPENEM PER 100 MG: Performed by: STUDENT IN AN ORGANIZED HEALTH CARE EDUCATION/TRAINING PROGRAM

## 2024-11-28 PROCEDURE — 85027 COMPLETE CBC AUTOMATED: CPT | Performed by: FAMILY MEDICINE

## 2024-11-28 PROCEDURE — 82948 REAGENT STRIP/BLOOD GLUCOSE: CPT

## 2024-11-28 PROCEDURE — 25010000002 PROCHLORPERAZINE 10 MG/2ML SOLUTION: Performed by: FAMILY MEDICINE

## 2024-11-28 RX ORDER — DOXYCYCLINE 100 MG/1
100 TABLET ORAL EVERY 12 HOURS SCHEDULED
Status: DISCONTINUED | OUTPATIENT
Start: 2024-11-28 | End: 2024-12-01 | Stop reason: HOSPADM

## 2024-11-28 RX ORDER — POLYETHYLENE GLYCOL 3350 17 G/17G
17 POWDER, FOR SOLUTION ORAL DAILY
Status: DISCONTINUED | OUTPATIENT
Start: 2024-11-28 | End: 2024-12-01 | Stop reason: HOSPADM

## 2024-11-28 RX ORDER — AMOXICILLIN 250 MG
2 CAPSULE ORAL NIGHTLY
Status: DISCONTINUED | OUTPATIENT
Start: 2024-11-28 | End: 2024-12-01 | Stop reason: HOSPADM

## 2024-11-28 RX ADMIN — DOCUSATE SODIUM 100 MG: 100 CAPSULE, LIQUID FILLED ORAL at 21:20

## 2024-11-28 RX ADMIN — LISINOPRIL 5 MG: 10 TABLET ORAL at 09:10

## 2024-11-28 RX ADMIN — OXYCODONE HYDROCHLORIDE AND ACETAMINOPHEN 1 TABLET: 5; 325 TABLET ORAL at 09:32

## 2024-11-28 RX ADMIN — OXYCODONE HYDROCHLORIDE AND ACETAMINOPHEN 1 TABLET: 5; 325 TABLET ORAL at 22:41

## 2024-11-28 RX ADMIN — FAMOTIDINE 20 MG: 10 INJECTION INTRAVENOUS at 09:32

## 2024-11-28 RX ADMIN — Medication 10 ML: at 21:21

## 2024-11-28 RX ADMIN — ONDANSETRON 4 MG: 2 INJECTION INTRAMUSCULAR; INTRAVENOUS at 09:32

## 2024-11-28 RX ADMIN — APIXABAN 5 MG: 5 TABLET, FILM COATED ORAL at 09:10

## 2024-11-28 RX ADMIN — Medication 10 ML: at 09:10

## 2024-11-28 RX ADMIN — PROCHLORPERAZINE EDISYLATE 5 MG: 5 INJECTION INTRAMUSCULAR; INTRAVENOUS at 04:13

## 2024-11-28 RX ADMIN — OXYCODONE HYDROCHLORIDE AND ACETAMINOPHEN 1 TABLET: 5; 325 TABLET ORAL at 04:13

## 2024-11-28 RX ADMIN — DULOXETINE HYDROCHLORIDE 30 MG: 30 CAPSULE, DELAYED RELEASE ORAL at 09:09

## 2024-11-28 RX ADMIN — APIXABAN 5 MG: 5 TABLET, FILM COATED ORAL at 21:20

## 2024-11-28 RX ADMIN — DOXYCYCLINE 100 MG: 100 TABLET ORAL at 21:20

## 2024-11-28 RX ADMIN — GUAIFENESIN 1200 MG: 600 TABLET, EXTENDED RELEASE ORAL at 09:10

## 2024-11-28 RX ADMIN — INSULIN LISPRO 4 UNITS: 100 INJECTION, SOLUTION INTRAVENOUS; SUBCUTANEOUS at 21:19

## 2024-11-28 RX ADMIN — ROSUVASTATIN 10 MG: 10 TABLET, FILM COATED ORAL at 09:10

## 2024-11-28 RX ADMIN — MEROPENEM 1000 MG: 1 INJECTION, POWDER, FOR SOLUTION INTRAVENOUS at 12:27

## 2024-11-28 RX ADMIN — DOXYCYCLINE 100 MG: 100 TABLET ORAL at 09:14

## 2024-11-28 RX ADMIN — DOCUSATE SODIUM 100 MG: 100 CAPSULE, LIQUID FILLED ORAL at 09:10

## 2024-11-28 RX ADMIN — GUAIFENESIN 1200 MG: 600 TABLET, EXTENDED RELEASE ORAL at 21:20

## 2024-11-28 RX ADMIN — TAMSULOSIN HYDROCHLORIDE 0.4 MG: 0.4 CAPSULE ORAL at 09:10

## 2024-11-28 RX ADMIN — MEROPENEM 1000 MG: 1 INJECTION, POWDER, FOR SOLUTION INTRAVENOUS at 21:20

## 2024-11-28 RX ADMIN — POLYETHYLENE GLYCOL 3350 17 G: 17 POWDER, FOR SOLUTION ORAL at 12:27

## 2024-11-28 RX ADMIN — PREGABALIN 75 MG: 75 CAPSULE ORAL at 21:20

## 2024-11-28 RX ADMIN — DOCUSATE SODIUM 50 MG AND SENNOSIDES 8.6 MG 2 TABLET: 8.6; 5 TABLET, FILM COATED ORAL at 21:20

## 2024-11-28 NOTE — PLAN OF CARE
Goal Outcome Evaluation: Medicated this shift for complaints of pain and nausea.  Physical therapy has seen patient this shift.  Patient on Doxycycline, Merrem and Augmentin this shift. Family has been at bedside this shift. Patient safety to be maintained this shift, continue to monitor and report abnormal to provider.

## 2024-11-28 NOTE — PROGRESS NOTES
Nephrology ([facility]) Progress Note      Patient:  Erick Luong  YOB: 1955  Date of Service: 11/28/2024  MRN: [id number]   Acct: [id number]   Primary Care Physician: Del Shetty MD  Advance Directive:   Code Status and Medical Interventions: CPR (Attempt to Resuscitate); Full Support   Ordered at: 11/18/24 0159     Code Status (Patient has no pulse and is not breathing):    CPR (Attempt to Resuscitate)     Medical Interventions (Patient has pulse or is breathing):    Full Support     Admit Date: 11/17/2024       Hospital Day: 9  Referring Provider: Garrett Alicia,*      Patient personally seen and examined.  Complete chart including Consults, Notes, Operative Reports, Labs, Cardiology, and Radiology studies reviewed as able.        Subjective:  Erick Luong is a 69 y.o. male for whom we were consulted for evaluation and treatment of acute kidney injury. Baseline chronic kidney disease stage 3b, baseline creatinine approximately 1.5-1.8.  Follows in our office. History of uncontrolled diabetes, hypertension, coronary artery disease, diabetic foot ulcer, obesity. Admitted with cystitis, foot wound and failure to thrive. Minimal PO intake recently. Had no specific complaints at time of nephrology initial exam. Hospital course remarkable for administration of IV fluids and reduction of Bumex dose. Bumex was stopped entirely afternoon of 11/21. Has been receiving IV fluids intermittently since then and renal function has been fluctuating.     Today, he is awake and alert. Having some non-productive cough. No overnight issues.  Urine output nonoliguric. No major leg edema.     Allergies:  Cefepime, Bactrim [sulfamethoxazole-trimethoprim], Vancomycin, Zolpidem, and Metronidazole    Home Meds:  Medications Prior to Admission   Medication Sig Dispense Refill Last Dose/Taking    apixaban (Eliquis) 5 MG tablet tablet Take 1 tablet by mouth 2 (Two) Times a Day. 60  tablet 0 Taking    ascorbic acid (VITAMIN C) 1000 MG tablet Take 1 tablet by mouth Daily. 30 tablet 3 Taking    bumetanide (BUMEX) 2 MG tablet Take 1 tablet by mouth 2 (Two) Times a Day. 6 tablet 3 Taking    busPIRone (BUSPAR) 10 MG tablet Take 1 tablet by mouth 3 (Three) Times a Day As Needed. (Patient taking differently: Take 1 tablet by mouth 3 (Three) Times a Day As Needed (anxiety).) 270 tablet 4 Taking Differently    donepezil (ARICEPT) 10 MG tablet Take 1 tablet by mouth every night at bedtime. 30 tablet 0 Taking    DULoxetine (CYMBALTA) 60 MG capsule Take 1 capsule by mouth Daily. 30 capsule 0 Taking    famotidine (PEPCID) 20 MG tablet Take 1 tablet by mouth 2 (Two) Times a Day. 60 tablet 0 Taking    insulin glargine (Lantus) 100 UNIT/ML injection Inject 35 Units under the skin into the appropriate area as directed every night at bedtime. 10 mL 0 Taking    lisinopril (PRINIVIL,ZESTRIL) 5 MG tablet Take 1 tablet by mouth Daily. 30 tablet 0 Taking    melatonin 3 MG tablet Take 2 tablets by mouth At Night As Needed for Sleep. 30 tablet 0 Taking As Needed    midodrine (PROAMATINE) 2.5 MG tablet Take 1 tablet by mouth 2 (Two) Times a Day. 60 tablet 5 Taking    oxyCODONE (ROXICODONE) 10 MG tablet Take 1 tablet by mouth 2 (Two) Times a Day As Needed. (Patient taking differently: Take 1 tablet by mouth 2 (Two) Times a Day As Needed for Moderate Pain or Severe Pain. *must last 30 days*) 55 tablet 0 Taking Differently    pantoprazole (PROTONIX) 40 MG EC tablet Take 1 tablet by mouth 2 (Two) Times a Day. 60 tablet 0 Taking    polyethylene glycol (MIRALAX) 17 GM/SCOOP powder Take 17 g by mouth Daily As Needed (constipation).   Taking As Needed    potassium chloride ER (K-TAB) 20 MEQ tablet controlled-release ER tablet Take 1 tablet by mouth Daily. 30 tablet 0 Taking    rosuvastatin (CRESTOR) 10 MG tablet Take 1 tablet by mouth Daily. 30 tablet 0 Taking    sennosides-docusate (PERICOLACE) 8.6-50 MG per tablet Take 1  tablet by mouth Every Night. Obtain OTC   Taking    tamsulosin (FLOMAX) 0.4 MG capsule 24 hr capsule Take 1 capsule by mouth Daily. 90 capsule 4 Taking    Insulin Lispro (humaLOG) 100 UNIT/ML injection Inject 2-12 Units under the skin into the appropriate area as directed 4 (Four) Times a Day Before Meals & at Bedtime. Inject subcutaneously four times a day per sliding scale:  0-150 = 0 units; 151-200 = 2u; 201-250 = 4u; 251-300 = 6u; 301-350 = 8u; 351-400 = 10u; 401+ = 12u       nitroglycerin (NITROSTAT) 0.4 MG SL tablet Place 1 tablet under the tongue Every 5 (Five) Minutes As Needed for Chest Pain. Take no more than 3 doses in 15 minutes.       pregabalin (LYRICA) 100 MG capsule Take 1 capsule by mouth Daily.       pregabalin (LYRICA) 100 MG capsule Take 2 capsules by mouth Every Night.       sodium hypochlorite (DAKIN'S 1/4 STRENGTH) 0.125 % solution topical solution 0.125% Apply  topically to the appropriate area as directed 2 (Two) Times a Day. 473 mL 5        Medicines:  Current Facility-Administered Medications   Medication Dose Route Frequency Provider Last Rate Last Admin    acetaminophen (TYLENOL) tablet 650 mg  650 mg Oral Q4H PRN Garrett Alicia MD        Or    acetaminophen (TYLENOL) 160 MG/5ML oral solution 650 mg  650 mg Oral Q4H PRN Garrett Alicia MD        Or    acetaminophen (TYLENOL) suppository 650 mg  650 mg Rectal Q4H PRN Garrett Alicia MD        apixaban (ELIQUIS) tablet 5 mg  5 mg Oral BID Garrett Alicia MD   5 mg at 11/28/24 0910    [Held by provider] ascorbic acid (VITAMIN C) tablet 500 mg  500 mg Oral BID Telly Rodriguez MD   500 mg at 11/20/24 0837    bisacodyl (DULCOLAX) EC tablet 5 mg  5 mg Oral Daily PRN Garrett Alicia MD   5 mg at 11/22/24 0924    And    bisacodyl (DULCOLAX) suppository 10 mg  10 mg Rectal Daily PRN Garrett Alicia MD   10 mg at 11/27/24 1136    [Held by provider] bumetanide (BUMEX) tablet 1 mg  1 mg Oral BID  Telly Rodriguez MD   1 mg at 11/21/24 0914    busPIRone (BUSPAR) tablet 10 mg  10 mg Oral TID PRN Garrett Alicia MD   10 mg at 11/20/24 2055    calcium carbonate (TUMS) chewable tablet 500 mg (200 mg elemental)  2 tablet Oral TID PRN Telly Rodriguez MD   2 tablet at 11/20/24 1459    Calcium Replacement - Follow Nurse / BPA Driven Protocol   Not Applicable PRN Telly Rodriguez MD        dextrose (D50W) (25 g/50 mL) IV injection 25 g  25 g Intravenous Q15 Min PRN Garrett Alicia MD        dextrose (GLUTOSE) oral gel 15 g  15 g Oral Q15 Min PRN Garrett Alicia MD   15 g at 11/19/24 0040    docusate sodium (COLACE) capsule 100 mg  100 mg Oral BID Telly Rodriguez MD   100 mg at 11/28/24 0910    [Held by provider] donepezil (ARICEPT) tablet 10 mg  10 mg Oral Nightly Garrett Alicia MD   10 mg at 11/18/24 0219    doxycycline (ADOXA) tablet 100 mg  100 mg Oral Q12H Germania Sheridan MD   100 mg at 11/28/24 0914    DULoxetine (CYMBALTA) DR capsule 30 mg  30 mg Oral Daily Telly Rodriguez MD   30 mg at 11/28/24 0909    famotidine (PEPCID) injection 20 mg  20 mg Intravenous Daily Telly Rodriguez MD   20 mg at 11/28/24 0932    glucagon (GLUCAGEN) injection 1 mg  1 mg Intramuscular Q15 Min PRN Garrett Alicia MD        guaiFENesin (MUCINEX) 12 hr tablet 1,200 mg  1,200 mg Oral Q12H Garrett Alicia MD   1,200 mg at 11/28/24 0910    insulin glargine (LANTUS, SEMGLEE) injection 20 Units  20 Units Subcutaneous Nightly Garrett Alicia MD   20 Units at 11/18/24 2143    Insulin Lispro (humaLOG) injection 10 Units  10 Units Subcutaneous TID With Meals Garrett Alicia MD   10 Units at 11/24/24 1705    Insulin Lispro (humaLOG) injection 2-9 Units  2-9 Units Subcutaneous 4x Daily AC & at Bedtime Garrett Alicia MD   2 Units at 11/27/24 1719    lisinopril (PRINIVIL,ZESTRIL) tablet 5 mg  5 mg Oral Daily Garrett Alicia MD   5 mg at 11/28/24 4715     Magnesium Standard Dose Replacement - Follow Nurse / BPA Driven Protocol   Not Applicable PRN Telly Rodriguez MD        meropenem (MERREM) 1,000 mg in sodium chloride 0.9 % 100 mL MBP  1,000 mg Intravenous Q12H Germania Sheridan MD   1,000 mg at 11/28/24 1227    nitroglycerin (NITROSTAT) SL tablet 0.4 mg  0.4 mg Sublingual Q5 Min PRN Garrett Alicia MD        ondansetron (ZOFRAN) injection 4 mg  4 mg Intravenous Q6H PRN Garrett Alicia MD   4 mg at 11/28/24 0932    oxyCODONE-acetaminophen (PERCOCET) 5-325 MG per tablet 1 tablet  1 tablet Oral Q4H PRN Germania Sheridan MD   1 tablet at 11/28/24 0932    Phosphorus Replacement - Follow Nurse / BPA Driven Protocol   Not Applicable PRN Telly Rodriguez MD        polyethylene glycol (MIRALAX) packet 17 g  17 g Oral Daily Germania Sheridan MD   17 g at 11/28/24 1227    Potassium Replacement - Follow Nurse / BPA Driven Protocol   Not Applicable PRN Telly Rodriguez MD        pregabalin (LYRICA) capsule 75 mg  75 mg Oral Nightly Telly Rodriguez MD   75 mg at 11/27/24 2118    prochlorperazine (COMPAZINE) injection 5 mg  5 mg Intravenous Q6H PRN Telly Rodriguez MD   5 mg at 11/28/24 0413    rosuvastatin (CRESTOR) tablet 10 mg  10 mg Oral Daily Garrett Alicia MD   10 mg at 11/28/24 0910    sennosides-docusate (PERICOLACE) 8.6-50 MG per tablet 2 tablet  2 tablet Oral Nightly Germania Sheridan MD        sodium chloride 0.9 % flush 10 mL  10 mL Intravenous Q12H Garrett Alicia MD   10 mL at 11/28/24 0910    sodium chloride 0.9 % flush 10 mL  10 mL Intravenous PRN Garrett Alicia MD        sodium chloride 0.9 % infusion 40 mL  40 mL Intravenous PRN Garrett Alicia MD        tamsulosin (FLOMAX) 24 hr capsule 0.4 mg  0.4 mg Oral Daily Garrett Alicia MD   0.4 mg at 11/28/24 0910    [Held by provider] zinc sulfate (ZINCATE) capsule 220 mg  220 mg Oral Daily Telly Rodriguez MD   220 mg at 11/20/24 0836       Past  Medical History:  Past Medical History:   Diagnosis Date    Arthritis     Autonomic disease     CAD (coronary artery disease) 02/06/2017    Cervical radiculopathy 09/16/2021    Chronic constipation with acute exaccerbation 05/10/2021    Coronary artery disease     Degeneration of cervical intervertebral disc 08/11/2021    Diabetes mellitus     Diabetic foot ulcer 08/31/2020    Diabetic polyneuropathy associated with type 2 diabetes mellitus 01/18/2021    Elevated cholesterol     Gastroesophageal reflux disease 05/13/2019    Headache     HTN (hypertension), benign 05/03/2017    Hyperlipidemia     Hypertension     Mixed hyperlipidemia 02/07/2017    Multiple lung nodules 01/26/2020    5mm, 9 mm RLL identified 1/2020, not present 10/2019.    Myocardial infarction     Osteomyelitis 01/22/2020    Osteomyelitis of fifth toe of right foot 10/07/2019    Pancreatitis     Persistent insomnia 01/20/2020    Renal disorder     Sleep apnea 02/06/2017    Sleep apnea with use of continuous positive airway pressure (CPAP)     NON-COMPLIANT    Slow transit constipation 01/16/2019    Spinal stenosis in cervical region 09/16/2021    Vitamin D deficiency 03/02/2021       Past Surgical History:  Past Surgical History:   Procedure Laterality Date    ABDOMINAL SURGERY      AMPUTATION FOOT / TOE Left 10/2021    5th digit     ANTERIOR CERVICAL DISCECTOMY W/ FUSION N/A 08/05/2022    Procedure: CERVICAL DISCECTOMY ANTERIOR WITH FUSION C5-6 with possible plating of C5-7 with neuromonitoring and 1 c-arm;  Surgeon: Karel Soliz MD;  Location:  [facility] OR;  Service: Neurosurgery;  Laterality: N/A;    APPENDECTOMY      BACK SURGERY      CARDIAC CATHETERIZATION Left 02/08/2021    Procedure: Left Heart Cath w poss intervention left anatomical snuff box acess;  Surgeon: Omkar Charles DO;  Location:  [facility] CATH INVASIVE LOCATION;  Service: Cardiology;  Laterality: Left;    CARDIAC SURGERY      CATARACT EXTRACTION      CERVICAL SPINE  SURGERY      COLONOSCOPY N/A 01/31/2017    Normal exam repeat in 5 years    COLONOSCOPY N/A 02/11/2019    Mild acute inflammation    COLONOSCOPY N/A 04/07/2024    2 areas at 10 and 20 cm with friability ulceration 2 clips placed at 20 cm and 4 clips at 10 cm poor prep normal mucosa,mild eroisions and ulcerations in visible vessels    COLONOSCOPY N/A 7/1/2024    Procedure: COLONOSCOPY WITH ANESTHESIA;  Surgeon: Arsalan Lorenzo DO;  Location: [facility] ENDOSCOPY;  Service: Gastroenterology;  Laterality: N/A;  pre op constipation/diarrhea  post poor prep  pcp Del Shetty    COLONOSCOPY N/A 7/2/2024    Procedure: COLONOSCOPY WITH ANESTHESIA;  Surgeon: Agapito Christopher MD;  Location: [facility] ENDOSCOPY;  Service: Gastroenterology;  Laterality: N/A;  pre rectal bleeding  post poor prep  pcp Del Shetty MD    COLONOSCOPY W/ POLYPECTOMY  03/04/2014    Hyperplastic polyp    CORONARY ARTERY BYPASS GRAFT  10/2015    ENDOSCOPY  04/13/2011    Gastritis with hemorrhage    ENDOSCOPY N/A 05/05/2017    Normal exam    ENDOSCOPY N/A 02/11/2019    Gastritis    ENDOSCOPY N/A 09/01/2020    Non-erosive gastritis with hemorrhage    ENDOSCOPY N/A 02/10/2021    Esophagitis    ENDOSCOPY N/A 04/11/2024    There were esophageal mucosal changes suspicious for short-segment Low's esophagus present in the distal esophagus. The maximum longitudinal extent of these mucosal changes was 2 cm in length. Mucosa was biopsied with a cold forceps for histologyDistal esophagus, biopsies: Mild chronic active esophagogastritis. No evidence of intestinal metaplasia, dysplasia. Antrum, bx, Mild chronic gastritis    FOOT SURGERY Left     INCISION AND DRAINAGE OF WOUND Left 09/2007    spider bite       Family History  Family History   Problem Relation Age of Onset    Colon cancer Father     Heart disease Father     Colon cancer Sister     Colon polyps Sister     Alzheimer's disease Mother     Coronary artery disease Sister     Coronary artery disease  Sister        Social History  Social History     Socioeconomic History    Marital status:    Tobacco Use    Smoking status: Former     Current packs/day: 0.00     Types: Cigarettes     Quit date:      Years since quittin.9    Smokeless tobacco: Never    Tobacco comments:     [place]   Vaping Use    Vaping status: Never Used   Substance and Sexual Activity    Alcohol use: No    Drug use: No    Sexual activity: Defer       Review of Systems:  History obtained from chart review and the patient  General ROS: No fever or chills  Respiratory ROS: No cough, shortness of breath, wheezing  Cardiovascular ROS: No chest pain or palpitations  Gastrointestinal ROS: No abdominal pain or melena  Genito-Urinary ROS: No dysuria or hematuria  Psych ROS: No anxiety and depression  14 point ROS reviewed with the patient and negative except as noted above and in the HPI unless unable to obtain.    Objective:  Patient Vitals for the past 24 hrs:   BP Temp Temp src Pulse Resp SpO2   24 1145 110/66 98.5 °F (36.9 °C) Oral 91 16 100 %   24 0800 122/73 98.5 °F (36.9 °C) Oral 76 16 95 %   24 0329 120/72 98.6 °F (37 °C) Oral 78 16 94 %   24 1942 123/55 98.4 °F (36.9 °C) Oral 81 18 91 %   24 1502 145/73 99 °F (37.2 °C) Oral 90 16 92 %       Intake/Output Summary (Last 24 hours) at 2024 1409  Last data filed at 2024 1310  Gross per 24 hour   Intake 120 ml   Output 1000 ml   Net -880 ml     General: awake/alert   Chest:  clear to auscultation bilaterally without respiratory distress  CVS: regular rate and rhythm  Abdominal: soft, nontender, positive bowel sounds  Extremities: no cyanosis or edema  Skin: warm and dry without rash      Labs:  Results from last 7 days   Lab Units 24  0424 24  0301 24  0234   WBC 10*3/mm3 5.32 7.23 5.93   HEMOGLOBIN g/dL 10.5* 11.1* 9.9*   HEMATOCRIT % 32.4* 34.3* 31.5*   PLATELETS 10*3/mm3 197 260 244         Results from last 7  days   Lab Units 11/28/24  0424 11/27/24  0301 11/26/24  0234   SODIUM mmol/L 137 140 138   POTASSIUM mmol/L 4.3 4.7 4.2   CHLORIDE mmol/L 104 108* 107   CO2 mmol/L 20.0* 22.0 22.0   BUN mg/dL 35* 39* 46*   CREATININE mg/dL 2.23* 2.21* 2.15*   CALCIUM mg/dL 8.4* 8.8 8.1*   EGFR mL/min/1.73 31.3* 31.7* 32.7*   GLUCOSE mg/dL 125* 195* 131*       Radiology:   Imaging Results (Last 72 Hours)       Procedure Component Value Units Date/Time    XR Abdomen KUB [[id number]] Collected: 11/27/24 1632     Updated: 11/27/24 1642    Narrative:      EXAMINATION: XR ABDOMEN KUB- 11/27/2024 4:07 PM     HISTORY: abdominal pain, constipation, nausea; N39.0-Urinary tract  infection, site not specified; N17.9-Acute kidney failure, unspecified;  N18.9-Chronic kidney disease, unspecified; R73.9-Hyperglycemia,  unspecified; Z74.09-Other reduced mobility.     REPORT: Supine imaging of the abdomen was performed.     COMPARISON: KUB 4/24/2024.     Moderate to large volume of stool seen throughout the colon, as well as  a moderate volume of gas and there is mild gaseous distention of a  central colonic loop, probably the sigmoid colon. This has a diameter of  10 cm. Cholecystectomy clips are present. No evidence of free air on  this limited supine view. No acute osseous abnormality.       Impression:      Moderate constipation and probable mild colonic ileus.     This report was signed and finalized on 11/27/2024 4:39 PM by Dr. Percy Cesar MD.       XR Chest 2 View [[id number]] Collected: 11/27/24 1606     Updated: 11/27/24 1609    Narrative:      EXAMINATION: XR CHEST 2 VW-  11/27/2024 4:06 PM     HISTORY: Wheezing and cough.     FINDINGS: 2 view exam of the chest demonstrates left lower lobe  pneumonia. The lungs are otherwise clear. No effusion or free air  present. The patient has undergone prior median sternotomy.       Impression:      1. Left lower lobe pneumonia.     This report was signed and finalized on 11/27/2024 4:06 PM by   Phillip Hart MD.               Culture:  Blood Culture   Date Value Ref Range Status   11/18/2024 No growth at 3 days  Preliminary   11/17/2024 No growth at 3 days  Preliminary     Urine Culture   Date Value Ref Range Status   11/17/2024 >100,000 CFU/mL Escherichia coli (A)  Preliminary         Assessment    Acute kidney injury--improving  Baseline chronic kidney disease stage 3b  Hypertension  Uncontrolled type 2 diabetes (A1c 13.4%)  Anemia of CKD  Metabolic acidosis--improved  Acute cystitis  Obesity     Plan:  Renal function stable   Monitor labs      Vinny Hartley MD  11/28/2024  14:09 CST

## 2024-11-28 NOTE — PLAN OF CARE
Goal Outcome Evaluation:  Plan of Care Reviewed With: patient        Progress: no change  Outcome Evaluation: New IV; IID; IV ABX; Cough congestion, mucinex ordered; up with assist; void; medicated for pain and nausea x2; accu checks; resting between care; safety maintained

## 2024-11-28 NOTE — THERAPY TREATMENT NOTE
Acute Care - Physical Therapy Treatment Note  [facility]     Patient Name: Erick Luong  : 1955  MRN: [id number]  Today's Date: 2024      Visit Dx:     ICD-10-CM ICD-9-CM   1. Acute UTI (urinary tract infection)  N39.0 599.0   2. Acute renal failure superimposed on chronic kidney disease, unspecified acute renal failure type, unspecified CKD stage  N17.9 584.9    N18.9 585.9   3. Acute hyperglycemia  R73.9 790.29   4. Impaired functional mobility and activity tolerance [Z74.09]  Z74.09 V49.89     Patient Active Problem List   Diagnosis    Obesity, unspecified obesity severity, unspecified obesity type    Essential hypertension    Type 2 diabetes mellitus with hyperglycemia, with long-term current use of insulin    Nonsmoker    Anemia due to chronic kidney disease    Class 3 severe obesity due to excess calories with body mass index (BMI) of 40.0 to 44.9 in adult    Anasarca    Sleep apnea with use of continuous positive airway pressure (CPAP)    Medically noncompliant    Diabetic ulcer of left foot associated with type 2 diabetes mellitus    Diabetic polyneuropathy associated with type 2 diabetes mellitus    Spinal stenosis in cervical region    Degeneration of cervical intervertebral disc    Cervical radiculopathy    Degeneration of lumbar or lumbosacral intervertebral disc    Cervical myelopathy    Bilateral carpal tunnel syndrome    CAD (coronary artery disease)    GERD without esophagitis    BPH without obstruction/lower urinary tract symptoms    Stage 3b chronic kidney disease    Chronic diastolic heart failure    Type 2 myocardial infarction due to heart failure    Left carpal tunnel syndrome    Syncope and collapse, recurrent episodes    Poorly-controlled hypertension    Rhinovirus    Peripheral vascular disease    Chronic kidney disease (CKD), stage IV (severe)    Diabetic foot infection    Sepsis    Epigastric pain    Chronic heart failure with preserved ejection fraction (HFpEF)    Sepsis  due to methicillin resistant Staphylococcus aureus (MRSA) with encephalopathy without septic shock    Slow transit constipation    CHF exacerbation    CHF (congestive heart failure), NYHA class I, acute on chronic, combined    Rectal bleeding    Acute on chronic heart failure with preserved ejection fraction (HFpEF)    DKA, type 2, not at goal    Atrial fibrillation    E. coli UTI, ESBL    Acute UTI (urinary tract infection)    Acute encephalopathy    Type 2 diabetes mellitus with hyperglycemia, with long-term current use of insulin     Past Medical History:   Diagnosis Date    Arthritis     Autonomic disease     CAD (coronary artery disease) 02/06/2017    Cervical radiculopathy 09/16/2021    Chronic constipation with acute exaccerbation 05/10/2021    Coronary artery disease     Degeneration of cervical intervertebral disc 08/11/2021    Diabetes mellitus     Diabetic foot ulcer 08/31/2020    Diabetic polyneuropathy associated with type 2 diabetes mellitus 01/18/2021    Elevated cholesterol     Gastroesophageal reflux disease 05/13/2019    Headache     HTN (hypertension), benign 05/03/2017    Hyperlipidemia     Hypertension     Mixed hyperlipidemia 02/07/2017    Multiple lung nodules 01/26/2020    5mm, 9 mm RLL identified 1/2020, not present 10/2019.    Myocardial infarction     Osteomyelitis 01/22/2020    Osteomyelitis of fifth toe of right foot 10/07/2019    Pancreatitis     Persistent insomnia 01/20/2020    Renal disorder     Sleep apnea 02/06/2017    Sleep apnea with use of continuous positive airway pressure (CPAP)     NON-COMPLIANT    Slow transit constipation 01/16/2019    Spinal stenosis in cervical region 09/16/2021    Vitamin D deficiency 03/02/2021     Past Surgical History:   Procedure Laterality Date    ABDOMINAL SURGERY      AMPUTATION FOOT / TOE Left 10/2021    5th digit     ANTERIOR CERVICAL DISCECTOMY W/ FUSION N/A 08/05/2022    Procedure: CERVICAL DISCECTOMY ANTERIOR WITH FUSION C5-6 with possible  plating of C5-7 with neuromonitoring and 1 c-arm;  Surgeon: Karel Soliz MD;  Location: [facility] OR;  Service: Neurosurgery;  Laterality: N/A;    APPENDECTOMY      BACK SURGERY      CARDIAC CATHETERIZATION Left 02/08/2021    Procedure: Left Heart Cath w poss intervention left anatomical snuff box acess;  Surgeon: Omkar Charles DO;  Location: [facility] CATH INVASIVE LOCATION;  Service: Cardiology;  Laterality: Left;    CARDIAC SURGERY      CATARACT EXTRACTION      CERVICAL SPINE SURGERY      COLONOSCOPY N/A 01/31/2017    Normal exam repeat in 5 years    COLONOSCOPY N/A 02/11/2019    Mild acute inflammation    COLONOSCOPY N/A 04/07/2024    2 areas at 10 and 20 cm with friability ulceration 2 clips placed at 20 cm and 4 clips at 10 cm poor prep normal mucosa,mild eroisions and ulcerations in visible vessels    COLONOSCOPY N/A 7/1/2024    Procedure: COLONOSCOPY WITH ANESTHESIA;  Surgeon: Arsalan Lorenzo DO;  Location: [facility] ENDOSCOPY;  Service: Gastroenterology;  Laterality: N/A;  pre op constipation/diarrhea  post poor prep  pcp Del Shetty    COLONOSCOPY N/A 7/2/2024    Procedure: COLONOSCOPY WITH ANESTHESIA;  Surgeon: Agapito Christopher MD;  Location: [facility] ENDOSCOPY;  Service: Gastroenterology;  Laterality: N/A;  pre rectal bleeding  post poor prep  pcp Del Shetty MD    COLONOSCOPY W/ POLYPECTOMY  03/04/2014    Hyperplastic polyp    CORONARY ARTERY BYPASS GRAFT  10/2015    ENDOSCOPY  04/13/2011    Gastritis with hemorrhage    ENDOSCOPY N/A 05/05/2017    Normal exam    ENDOSCOPY N/A 02/11/2019    Gastritis    ENDOSCOPY N/A 09/01/2020    Non-erosive gastritis with hemorrhage    ENDOSCOPY N/A 02/10/2021    Esophagitis    ENDOSCOPY N/A 04/11/2024    There were esophageal mucosal changes suspicious for short-segment Low's esophagus present in the distal esophagus. The maximum longitudinal extent of these mucosal changes was 2 cm in length. Mucosa was biopsied with a cold forceps for  histologyDistal esophagus, biopsies: Mild chronic active esophagogastritis. No evidence of intestinal metaplasia, dysplasia. Antrum, bx, Mild chronic gastritis    FOOT SURGERY Left     INCISION AND DRAINAGE OF WOUND Left 09/2007    spider bite     PT Assessment (Last 12 Hours)       PT Evaluation and Treatment       Row Name 11/28/24 1055          Physical Therapy Time and Intention    Subjective Information complains of;fatigue  -LY     Document Type therapy note (daily note)  -LY     Mode of Treatment physical therapy  -LY       Row Name 11/28/24 1055          General Information    Existing Precautions/Restrictions fall;weight bearing  WBAT with cam boot, NWB when not utilized  -LY       Row Name 11/28/24 1055          Pain    Pretreatment Pain Rating 0/10 - no pain  -LY     Posttreatment Pain Rating 0/10 - no pain  -LY       Row Name 11/28/24 1055          Bed Mobility    Supine-Sit [place] (Bed Mobility) modified independence  -LY     Assistive Device (Bed Mobility) head of bed elevated;repositioning sheet  -LY       Row Name 11/28/24 1055          Bed-Chair Transfer    Bed-Chair [place] (Transfers) contact guard  -LY     Assistive Device (Bed-Chair Transfers) walker, front-wheeled  -LY       Row Name 11/28/24 1055          Sit-Stand Transfer    Sit-Stand [place] (Transfers) contact guard  -LY     Assistive Device (Sit-Stand Transfers) walker, front-wheeled  -LY       Row Name 11/28/24 1055          Gait/Stairs (Locomotion)    [place] Level (Gait) contact guard;verbal cues  -LY     Assistive Device (Gait) walker, front-wheeled  -LY     Distance in Feet (Gait) 40  -LY     Pattern (Gait) step-to  -LY     Deviations/Abnormal Patterns (Gait) gait speed decreased;stride length decreased  -LY     Bilateral Gait Deviations forward flexed posture;heel strike decreased  -LY     Comment, (Gait/Stairs) cam boot with ambulation  -LY       Row Name             Wound 08/22/23 0140 Left posterior plantar     Wound - Properties Group Placement Date: 08/22/23  -AM Placement Time: 0140  -AM Side: Left  -AM Orientation: posterior  -AM Location: plantar  -AM Present on Original Admission: Y  -AM    Retired Wound - Properties Group Placement Date: 08/22/23  -AM Placement Time: 0140  -AM Present on Original Admission: Y  -AM Side: Left  -AM Orientation: posterior  -AM Location: plantar  -AM    Retired Wound - Properties Group Placement Date: 08/22/23  -AM Placement Time: 0140  -AM Present on Original Admission: Y  -AM Side: Left  -AM Orientation: posterior  -AM Location: plantar  -AM    Retired Wound - Properties Group Date first assessed: 08/22/23  -AM Time first assessed: 0140  -AM Present on Original Admission: Y  -AM Side: Left  -AM Location: plantar  -AM      Row Name             Wound 06/15/23 0102 Left anterior plantar    Wound - Properties Group Placement Date: 06/15/23  -SM Placement Time: 0102  -SM Side: Left  -SM Orientation: anterior  -SM Location: plantar  -SM Removal Date: --  -JR Removal Time: --  -JR    Retired Wound - Properties Group Placement Date: 06/15/23  -SM Placement Time: 0102  -SM Side: Left  -SM Orientation: anterior  -SM Location: plantar  -SM Removal Date: --  -JR Removal Time: --  -JR    Retired Wound - Properties Group Placement Date: 06/15/23  -SM Placement Time: 0102  -SM Side: Left  -SM Orientation: anterior  -SM Location: plantar  -SM Removal Date: --  -JR Removal Time: --  -JR    Retired Wound - Properties Group Date first assessed: 06/15/23  -SM Time first assessed: 0102  -SM Side: Left  -SM Location: plantar  -SM Resolution Date: --  -JR Resolution Time: --  -JR      Row Name             Wound 11/18/24 0046 Left lower arm skin tear;abrasion    Wound - Properties Group Placement Date: 11/18/24  -JR Placement Time: 0046  -JR Side: Left  -JR Orientation: lower  -JR Location: arm  -JR Primary Wound Type: Abrasion  -JR Type: skin tear;abrasion  -JR Present on Original Admission: Y  -JR     Retired Wound - Properties Group Placement Date: 11/18/24  -JR Placement Time: 0046  -JR Present on Original Admission: Y  -JR Side: Left  -JR Orientation: lower  -JR Location: arm  -JR Primary Wound Type: Abrasion  -JR Type: skin tear;abrasion  -JR    Retired Wound - Properties Group Placement Date: 11/18/24  -JR Placement Time: 0046 -JR Present on Original Admission: Y  -JR Side: Left  -JR Orientation: lower  -JR Location: arm  -JR Primary Wound Type: Abrasion  -JR Type: skin tear;abrasion  -JR    Retired Wound - Properties Group Date first assessed: 11/18/24  -JR Time first assessed: 0046  -JR Present on Original Admission: Y  -JR Side: Left  -JR Location: arm  -JR Primary Wound Type: Abrasion  -JR Type: skin tear;abrasion  -JR      Row Name             Wound 11/18/24 0056 Left medial foot diabetic ulcer    Wound - Properties Group Placement Date: 11/18/24  -JR Placement Time: 0056  -JR Side: Left  -JR Orientation: medial  -JR Location: foot  -JR Primary Wound Type: Diabetic ulc  -JR Type: diabetic ulcer  -JR Present on Original Admission: Y  -JR    Retired Wound - Properties Group Placement Date: 11/18/24  -JR Placement Time: 0056  -JR Present on Original Admission: Y  -JR Side: Left  -JR Orientation: medial  -JR Location: foot  -JR Primary Wound Type: Diabetic ulc  -JR Type: diabetic ulcer  -JR    Retired Wound - Properties Group Placement Date: 11/18/24  -JR Placement Time: 0056  -JR Present on Original Admission: Y  -JR Side: Left  -JR Orientation: medial  -JR Location: foot  -JR Primary Wound Type: Diabetic ulc  -JR Type: diabetic ulcer  -JR    Retired Wound - Properties Group Date first assessed: 11/18/24  -JR Time first assessed: 0056  -JR Present on Original Admission: Y  -JR Side: Left  -JR Location: foot  -JR Primary Wound Type: Diabetic ulc  -JR Type: diabetic ulcer  -JR      Row Name 11/28/24 1055          Positioning and Restraints    Pre-Treatment Position in bed  -LY     Post Treatment Position  chair  -LY     In Chair reclined;call light within reach;encouraged to call for assist;exit alarm on  -LY               User Key  (r) = Recorded By, (t) = Taken By, (c) = Cosigned By      Initials Name Provider Type    Alesha Armenta, PTA Physical Therapist Assistant    Faviola Kunz RN Registered Nurse    Michelle Diaz, RN Registered Nurse    Sabas Young, TAYLORNA Licensed Nurse    Sabas Young, RN Registered Nurse                    Physical Therapy Education       Title: PT OT SLP Therapies (In Progress)       Topic: Physical Therapy (Done)       Point: Mobility training (Done)       Learning Progress Summary            Patient Acceptance, E, VU by AJ at 11/27/2024 1343    Comment: continued role of PT, benefits of OOB activity, goal update and progress    Acceptance, E, NR by AE at 11/21/2024 0850    Comment: NWB L LE and need for CAM boot    Acceptance, E, VU by JACIEL at 11/19/2024 0958    Comment: limited gait until boot for RLE    Acceptance, E,TB,D, VU,NR by JE at 11/18/2024 1128    Comment: education re: purpose of PT/importance of activity, safety/falls prevention, NWB L LE due to open wounds, improved tech w/ tfers, positioning w/ L LE elevated                      Point: Home exercise program (Done)       Learning Progress Summary            Patient Acceptance, E, VU by AJ at 11/27/2024 1343    Comment: continued role of PT, benefits of OOB activity, goal update and progress    Acceptance, E, NR by AE at 11/21/2024 0850    Comment: NWB L LE and need for CAM boot                      Point: Precautions (Done)       Learning Progress Summary            Patient Acceptance, E, VU by AJ at 11/27/2024 1343    Comment: continued role of PT, benefits of OOB activity, goal update and progress    Acceptance, E,TB,D, VU,NR by JE at 11/18/2024 1128    Comment: education re: purpose of PT/importance of activity, safety/falls prevention, NWB L LE due to open wounds, improved tech w/ tfers,  positioning w/ L LE elevated                                      User Key       Initials Effective Dates Name Provider Type Discipline    AE 02/03/23 -  Hansa Arambula, PTA Physical Therapist Assistant PT    JACIEL 02/03/23 -  Sabas Maharaj, PTA Physical Therapist Assistant PT    JE 08/02/18 -  Melly Mustafa, PT Physical Therapist PT    AJ 08/15/24 -  Ronal Barry, PT DPT Physical Therapist PT                  PT Recommendation and Plan     Plan of Care Reviewed With: patient  Progress: improving  Outcome Evaluation: PT tx completed. Pt up in chair on arrival, agrees to therapy. Assisted with cam boot donning on L foot. Completed BLE AROM seated x20 reps while seated. SBA for sit to stands. Amb 50' at a time with RWX and CGA. C/o L foot and R flank plain during mobility. Required seated rest breaks. Will cont to follow as pt tolerates.   Outcome Measures       Row Name 11/26/24 0900             How much help from another is currently needed...    Putting on and taking off regular lower body clothing? 2  -AC (r) JR (t) AC (c)      Bathing (including washing, rinsing, and drying) 3  -AC (r) JR (t) AC (c)      Toileting (which includes using toilet bed pan or urinal) 3  -AC (r) JR (t) AC (c)      Putting on and taking off regular upper body clothing 4  -AC (r) JR (t) AC (c)      Taking care of personal grooming (such as brushing teeth) 4  -AC (r) JR (t) AC (c)      Eating meals 4  -AC (r) JR (t) AC (c)      AM-PAC 6 Clicks Score (OT) 20  -AC (r) JR (t)         Functional Assessment    Outcome Measure Options AM-PAC 6 Clicks Daily Activity (OT)  -AC (r) JR (t) AC (c)                User Key  (r) = Recorded By, (t) = Taken By, (c) = Cosigned By      Initials Name Provider Type    AC Ezekiel Blake, OTR/L, CNT Occupational Therapist    Fifi Wren, OT Student OT Student                     Time Calculation:    PT Charges       Row Name 11/28/24 1125             Time Calculation    Start Time 1055  -LY      Stop  Time 1107  -LY      Time Calculation (min) 12 min  -LY      PT Received On 11/28/24  -LY         Time Calculation- PT    Total Timed Code Minutes- PT 12 minute(s)  -LY         Timed Charges    67426 - Gait Training Minutes  12  -LY         Total Minutes    Timed Charges Total Minutes 12  -LY       Total Minutes 12  -LY                User Key  (r) = Recorded By, (t) = Taken By, (c) = Cosigned By      Initials Name Provider Type    LY Alesha Dominguez PTA Physical Therapist Assistant                  Therapy Charges for Today       Code Description Service Date Service Provider Modifiers Qty    66507224405 HC GAIT TRAINING EA 15 MIN 11/28/2024 Alesha Dominguez PTA GP 1            PT G-Codes  Outcome Measure Options: AM-PAC 6 Clicks Basic Mobility (PT)  AM-PAC 6 Clicks Score (PT): 17  AM-PAC 6 Clicks Score (OT): 20    Alesha Dominguez PTA  11/28/2024

## 2024-11-29 LAB
ANION GAP SERPL CALCULATED.3IONS-SCNC: 9 MMOL/L (ref 5–15)
BUN SERPL-MCNC: 41 MG/DL (ref 8–23)
BUN/CREAT SERPL: 18.1 (ref 7–25)
CALCIUM SPEC-SCNC: 8.4 MG/DL (ref 8.6–10.5)
CHLORIDE SERPL-SCNC: 106 MMOL/L (ref 98–107)
CO2 SERPL-SCNC: 23 MMOL/L (ref 22–29)
CREAT SERPL-MCNC: 2.27 MG/DL (ref 0.76–1.27)
DEPRECATED RDW RBC AUTO: 50.4 FL (ref 37–54)
EGFRCR SERPLBLD CKD-EPI 2021: 30.7 ML/MIN/1.73
ERYTHROCYTE [DISTWIDTH] IN BLOOD BY AUTOMATED COUNT: 15.8 % (ref 12.3–15.4)
GLUCOSE BLDC GLUCOMTR-MCNC: 115 MG/DL (ref 70–130)
GLUCOSE BLDC GLUCOMTR-MCNC: 136 MG/DL (ref 70–130)
GLUCOSE BLDC GLUCOMTR-MCNC: 197 MG/DL (ref 70–130)
GLUCOSE BLDC GLUCOMTR-MCNC: 197 MG/DL (ref 70–130)
GLUCOSE SERPL-MCNC: 131 MG/DL (ref 65–99)
HCT VFR BLD AUTO: 31.9 % (ref 37.5–51)
HGB BLD-MCNC: 10.1 G/DL (ref 13–17.7)
MCH RBC QN AUTO: 27.9 PG (ref 26.6–33)
MCHC RBC AUTO-ENTMCNC: 31.7 G/DL (ref 31.5–35.7)
MCV RBC AUTO: 88.1 FL (ref 79–97)
PLATELET # BLD AUTO: 183 10*3/MM3 (ref 140–450)
PMV BLD AUTO: 11.1 FL (ref 6–12)
POTASSIUM SERPL-SCNC: 4.8 MMOL/L (ref 3.5–5.2)
RBC # BLD AUTO: 3.62 10*6/MM3 (ref 4.14–5.8)
SODIUM SERPL-SCNC: 138 MMOL/L (ref 136–145)
WBC NRBC COR # BLD AUTO: 4.55 10*3/MM3 (ref 3.4–10.8)

## 2024-11-29 PROCEDURE — 63710000001 INSULIN LISPRO (HUMAN) PER 5 UNITS: Performed by: FAMILY MEDICINE

## 2024-11-29 PROCEDURE — 80048 BASIC METABOLIC PNL TOTAL CA: CPT | Performed by: FAMILY MEDICINE

## 2024-11-29 PROCEDURE — 82948 REAGENT STRIP/BLOOD GLUCOSE: CPT

## 2024-11-29 PROCEDURE — 25010000002 ONDANSETRON PER 1 MG: Performed by: FAMILY MEDICINE

## 2024-11-29 PROCEDURE — 85027 COMPLETE CBC AUTOMATED: CPT | Performed by: FAMILY MEDICINE

## 2024-11-29 PROCEDURE — 97530 THERAPEUTIC ACTIVITIES: CPT

## 2024-11-29 PROCEDURE — 97116 GAIT TRAINING THERAPY: CPT

## 2024-11-29 RX ORDER — LEVOFLOXACIN 500 MG/1
750 TABLET, FILM COATED ORAL EVERY OTHER DAY
Status: DISCONTINUED | OUTPATIENT
Start: 2024-11-29 | End: 2024-12-01 | Stop reason: HOSPADM

## 2024-11-29 RX ADMIN — DOCUSATE SODIUM 50 MG AND SENNOSIDES 8.6 MG 2 TABLET: 8.6; 5 TABLET, FILM COATED ORAL at 20:58

## 2024-11-29 RX ADMIN — APIXABAN 5 MG: 5 TABLET, FILM COATED ORAL at 08:24

## 2024-11-29 RX ADMIN — ROSUVASTATIN 10 MG: 10 TABLET, FILM COATED ORAL at 08:23

## 2024-11-29 RX ADMIN — DOCUSATE SODIUM 100 MG: 100 CAPSULE, LIQUID FILLED ORAL at 20:58

## 2024-11-29 RX ADMIN — LEVOFLOXACIN 750 MG: 500 TABLET, FILM COATED ORAL at 10:10

## 2024-11-29 RX ADMIN — GUAIFENESIN 1200 MG: 600 TABLET, EXTENDED RELEASE ORAL at 20:58

## 2024-11-29 RX ADMIN — DOXYCYCLINE 100 MG: 100 TABLET ORAL at 20:58

## 2024-11-29 RX ADMIN — INSULIN LISPRO 2 UNITS: 100 INJECTION, SOLUTION INTRAVENOUS; SUBCUTANEOUS at 17:26

## 2024-11-29 RX ADMIN — POLYETHYLENE GLYCOL 3350 17 G: 17 POWDER, FOR SOLUTION ORAL at 08:23

## 2024-11-29 RX ADMIN — TAMSULOSIN HYDROCHLORIDE 0.4 MG: 0.4 CAPSULE ORAL at 08:23

## 2024-11-29 RX ADMIN — LISINOPRIL 5 MG: 10 TABLET ORAL at 08:23

## 2024-11-29 RX ADMIN — ONDANSETRON 4 MG: 2 INJECTION INTRAMUSCULAR; INTRAVENOUS at 10:18

## 2024-11-29 RX ADMIN — GUAIFENESIN 1200 MG: 600 TABLET, EXTENDED RELEASE ORAL at 08:23

## 2024-11-29 RX ADMIN — INSULIN LISPRO 2 UNITS: 100 INJECTION, SOLUTION INTRAVENOUS; SUBCUTANEOUS at 20:58

## 2024-11-29 RX ADMIN — OXYCODONE HYDROCHLORIDE AND ACETAMINOPHEN 1 TABLET: 5; 325 TABLET ORAL at 15:54

## 2024-11-29 RX ADMIN — DOCUSATE SODIUM 100 MG: 100 CAPSULE, LIQUID FILLED ORAL at 08:24

## 2024-11-29 RX ADMIN — ONDANSETRON 4 MG: 2 INJECTION INTRAMUSCULAR; INTRAVENOUS at 01:05

## 2024-11-29 RX ADMIN — APIXABAN 5 MG: 5 TABLET, FILM COATED ORAL at 20:58

## 2024-11-29 RX ADMIN — DOXYCYCLINE 100 MG: 100 TABLET ORAL at 08:24

## 2024-11-29 RX ADMIN — Medication 10 ML: at 20:58

## 2024-11-29 RX ADMIN — Medication 10 ML: at 10:11

## 2024-11-29 RX ADMIN — PREGABALIN 75 MG: 75 CAPSULE ORAL at 20:58

## 2024-11-29 RX ADMIN — ONDANSETRON 4 MG: 2 INJECTION INTRAMUSCULAR; INTRAVENOUS at 21:09

## 2024-11-29 RX ADMIN — OXYCODONE HYDROCHLORIDE AND ACETAMINOPHEN 1 TABLET: 5; 325 TABLET ORAL at 08:23

## 2024-11-29 RX ADMIN — FAMOTIDINE 20 MG: 10 INJECTION INTRAVENOUS at 08:23

## 2024-11-29 RX ADMIN — OXYCODONE HYDROCHLORIDE AND ACETAMINOPHEN 1 TABLET: 5; 325 TABLET ORAL at 21:09

## 2024-11-29 RX ADMIN — DULOXETINE HYDROCHLORIDE 30 MG: 30 CAPSULE, DELAYED RELEASE ORAL at 08:23

## 2024-11-29 NOTE — PLAN OF CARE
Goal Outcome Evaluation:  Plan of Care Reviewed With: patient           Outcome Evaluation: A&OX4, vitals stable. C/o chronic back, see MAR. ehsan, pt continuing to refuse additinal 10 units of humalog with meals, chronic wounds to left foot and left arm, drsg CDI.  pt had fall this shift, tried to get out of bathroom without assistance, skin tears to R shoulder, R forearm and L elbow, pictures obtained and uploaded. post-fall orders initiatied, MD notified and family notified. contact precautions maintained, Safety maintained, bed alarm on.

## 2024-11-29 NOTE — PLAN OF CARE
Goal Outcome Evaluation:  Plan of Care Reviewed With: patient        Progress: improving  Outcome Evaluation: Pt was Independent for supine to sit and c/o L foot pain 4/10 post PT.PTA donned CAM boot on L LE.He was CGA to stand and to walk with RW 40ft with no LOB.Pt practiced sit to stand x 5 reps CGA.Pt would benefit from  PT.

## 2024-11-29 NOTE — PROGRESS NOTES
[facility] Medicine Services  INPATIENT PROGRESS NOTE    Patient Name: Erick Luong  Date of Admission: 11/17/2024  Today's Date: 11/29/24  Length of Stay: 10  Primary Care Physician: Del Shetty MD    Subjective   Chief Complaint: AMS/Failure to thrive    No acute events overnight. Patient is feeling better overall. No improvement in kidney function.     Review of Systems   All pertinent negatives and positives are as above. All other systems have been reviewed and are negative unless otherwise stated.     Objective    Temp:  [97.7 °F (36.5 °C)-98.5 °F (36.9 °C)] 98.4 °F (36.9 °C)  Heart Rate:  [68-91] 81  Resp:  [15-16] 16  BP: (110-141)/(65-68) 141/67  Physical Exam  GEN: Awake, alert, interactive, in NAD, obese  HEENT: Atraumatic,  EOMI, Anicteric,   Lungs: Inspiratory wheeze on right side improved  Heart: RRR, +S1/s2, no rub  ABD: soft, nontender, +BS, no guarding/rebound  Extremities: atraumatic, no cyanosis, no edema  Skin: bilateral wounds on feet  Neuro: AAOx3, no focal deficits  Psych: normal mood & affect        Results Review:  I have reviewed the labs, radiology results, and diagnostic studies.    Laboratory Data:   Results from last 7 days   Lab Units 11/29/24  0600 11/28/24  0424 11/27/24  0301   WBC 10*3/mm3 4.55 5.32 7.23   HEMOGLOBIN g/dL 10.1* 10.5* 11.1*   HEMATOCRIT % 31.9* 32.4* 34.3*   PLATELETS 10*3/mm3 183 197 260        Results from last 7 days   Lab Units 11/29/24  0600 11/28/24  0424 11/27/24  0301   SODIUM mmol/L 138 137 140   POTASSIUM mmol/L 4.8 4.3 4.7   CHLORIDE mmol/L 106 104 108*   CO2 mmol/L 23.0 20.0* 22.0   BUN mg/dL 41* 35* 39*   CREATININE mg/dL 2.27* 2.23* 2.21*   CALCIUM mg/dL 8.4* 8.4* 8.8   GLUCOSE mg/dL 131* 125* 195*       Culture Data:   [facility]@    Radiology Data:   Imaging Results (Last 24 Hours)       ** No results found for the last 24 hours. **            I have reviewed the patient's current medications.      Assessment/Plan   Assessment  Active Hospital Problems    Diagnosis     **Acute UTI (urinary tract infection)     Acute encephalopathy     Type 2 diabetes mellitus with hyperglycemia, with long-term current use of insulin     Atrial fibrillation     Chronic heart failure with preserved ejection fraction (HFpEF)     Stage 3b chronic kidney disease     CAD (coronary artery disease)     BPH without obstruction/lower urinary tract symptoms     Diabetic ulcer of left foot associated with type 2 diabetes mellitus     Sleep apnea with use of continuous positive airway pressure (CPAP)     Essential hypertension      Erick Luong is a 69 y.o. male with past medical history of coronary artery disease, CABG, Afib, diabetes mellitus insulin-dependent, diabetic foot ulcer, hypertension, sleep apnea noncompliant with CPAP, right seventh rib fracture on 10/20/2024, presents emergency room with complaints of mental status changes disorientation suprapubic pain and burning with urination.     Treatment Plan:    #  Left lower lobe pneumonia  # Hospital-acquired pneumonia  Some wheezing noted on physical exam 11/27  Chest x-ray showing left lower lobe pneumonia  Respiratory PCR panel negative  MRSA screen positive   Allergic to Vancomycin.  Cannot use Linezolid due to concurrent use of Cymbalta and BuSpar which increases risk of serotonin syndrome.  - MRSA coverage with Doxycyline - started 11/28  - switch meropenem to PO Levaquin for Pseudomonas coverage - renal dosing q48h  - Patient has stable vitals, WBC 4, feeling better - can be on oral regimen     # ESBL E. coli UTI - completed 10 days of Ertapenem    # WANDER on CKD 3b   Cr 2.83 >>> 3.00 > 2.55 > 2.15 > 2.27 today   - Nephrology following  --recommend encouraging p.o. intake  - BNP 6000, consider resuming po bumex -- will defer to nephrology     # Wounds on Bilateral Feet  - Evaluated by wound care    # Hypertension - continue lisinopril  # Hyperlipidemia - continue  crestor  # Diabetes mellitus - refusing lantus 20, lispro 10/10/10, agrees to some SSI   # Depression - continue Buspar and Cymbalta  # A-fib - continue Eliquis  # Constipation - docusate and pericolace.    # BPH - continue home flomax         Medical Decision Making  Number and Complexity of problems: 1 acute, 1 acute on chronic, others chronic  Differential Diagnosis: As above    Conditions and Status       Condition is improving.  Stable.     MDM Data  External documents reviewed: Reviewed  Cardiac tracing (EKG, telemetry) interpretation: Reviewed  Radiology interpretation: Reviewed  Labs reviewed: As above  Any tests that were considered but not ordered: None     Decision rules/scores evaluated (example [id number], Wells, etc): None     Discussed with: Patient      Care Planning  Shared decision making: Discussed with patient   Code status and discussions: Full code    Disposition  Social Determinants of Health that impact treatment or disposition: None  I expect the patient to be discharged to home in TBD days.         Electronically signed by Germania Sheridan MD, 11/29/24, 08:58 CST.

## 2024-11-29 NOTE — CASE MANAGEMENT/SOCIAL WORK
Continued Stay Note  [facility]     Patient Name: Erick Luong  MRN: [id number]  Today's Date: 11/29/2024    Admit Date: 11/17/2024    Plan: Home   Discharge Plan       Row Name 11/29/24 1217       Plan    Plan Home    Patient/Family in Agreement with Plan yes    Plan Comments Pt continues to work with therapy. He will return home with his spouse at d/c. Therapy recommends home health.                   Discharge Codes    No documentation.                 Expected Discharge Date and Time       Expected Discharge Date Expected Discharge Time    Nov 29, 2024               MEJIA Carreno

## 2024-11-29 NOTE — THERAPY TREATMENT NOTE
Acute Care - Physical Therapy Treatment Note  [facility]     Patient Name: Erick Luong  : 1955  MRN: [id number]  Today's Date: 2024      Visit Dx:     ICD-10-CM ICD-9-CM   1. Acute UTI (urinary tract infection)  N39.0 599.0   2. Acute renal failure superimposed on chronic kidney disease, unspecified acute renal failure type, unspecified CKD stage  N17.9 584.9    N18.9 585.9   3. Acute hyperglycemia  R73.9 790.29   4. Impaired functional mobility and activity tolerance [Z74.09]  Z74.09 V49.89     Patient Active Problem List   Diagnosis    Obesity, unspecified obesity severity, unspecified obesity type    Essential hypertension    Type 2 diabetes mellitus with hyperglycemia, with long-term current use of insulin    Nonsmoker    Anemia due to chronic kidney disease    Class 3 severe obesity due to excess calories with body mass index (BMI) of 40.0 to 44.9 in adult    Anasarca    Sleep apnea with use of continuous positive airway pressure (CPAP)    Medically noncompliant    Diabetic ulcer of left foot associated with type 2 diabetes mellitus    Diabetic polyneuropathy associated with type 2 diabetes mellitus    Spinal stenosis in cervical region    Degeneration of cervical intervertebral disc    Cervical radiculopathy    Degeneration of lumbar or lumbosacral intervertebral disc    Cervical myelopathy    Bilateral carpal tunnel syndrome    CAD (coronary artery disease)    GERD without esophagitis    BPH without obstruction/lower urinary tract symptoms    Stage 3b chronic kidney disease    Chronic diastolic heart failure    Type 2 myocardial infarction due to heart failure    Left carpal tunnel syndrome    Syncope and collapse, recurrent episodes    Poorly-controlled hypertension    Rhinovirus    Peripheral vascular disease    Chronic kidney disease (CKD), stage IV (severe)    Diabetic foot infection    Sepsis    Epigastric pain    Chronic heart failure with preserved ejection fraction (HFpEF)    Sepsis  due to methicillin resistant Staphylococcus aureus (MRSA) with encephalopathy without septic shock    Slow transit constipation    CHF exacerbation    CHF (congestive heart failure), NYHA class I, acute on chronic, combined    Rectal bleeding    Acute on chronic heart failure with preserved ejection fraction (HFpEF)    DKA, type 2, not at goal    Atrial fibrillation    E. coli UTI, ESBL    Acute UTI (urinary tract infection)    Acute encephalopathy    Type 2 diabetes mellitus with hyperglycemia, with long-term current use of insulin     Past Medical History:   Diagnosis Date    Arthritis     Autonomic disease     CAD (coronary artery disease) 02/06/2017    Cervical radiculopathy 09/16/2021    Chronic constipation with acute exaccerbation 05/10/2021    Coronary artery disease     Degeneration of cervical intervertebral disc 08/11/2021    Diabetes mellitus     Diabetic foot ulcer 08/31/2020    Diabetic polyneuropathy associated with type 2 diabetes mellitus 01/18/2021    Elevated cholesterol     Gastroesophageal reflux disease 05/13/2019    Headache     HTN (hypertension), benign 05/03/2017    Hyperlipidemia     Hypertension     Mixed hyperlipidemia 02/07/2017    Multiple lung nodules 01/26/2020    5mm, 9 mm RLL identified 1/2020, not present 10/2019.    Myocardial infarction     Osteomyelitis 01/22/2020    Osteomyelitis of fifth toe of right foot 10/07/2019    Pancreatitis     Persistent insomnia 01/20/2020    Renal disorder     Sleep apnea 02/06/2017    Sleep apnea with use of continuous positive airway pressure (CPAP)     NON-COMPLIANT    Slow transit constipation 01/16/2019    Spinal stenosis in cervical region 09/16/2021    Vitamin D deficiency 03/02/2021     Past Surgical History:   Procedure Laterality Date    ABDOMINAL SURGERY      AMPUTATION FOOT / TOE Left 10/2021    5th digit     ANTERIOR CERVICAL DISCECTOMY W/ FUSION N/A 08/05/2022    Procedure: CERVICAL DISCECTOMY ANTERIOR WITH FUSION C5-6 with possible  plating of C5-7 with neuromonitoring and 1 c-arm;  Surgeon: Karel Soliz MD;  Location: [facility] OR;  Service: Neurosurgery;  Laterality: N/A;    APPENDECTOMY      BACK SURGERY      CARDIAC CATHETERIZATION Left 02/08/2021    Procedure: Left Heart Cath w poss intervention left anatomical snuff box acess;  Surgeon: Omkar Charles DO;  Location: [facility] CATH INVASIVE LOCATION;  Service: Cardiology;  Laterality: Left;    CARDIAC SURGERY      CATARACT EXTRACTION      CERVICAL SPINE SURGERY      COLONOSCOPY N/A 01/31/2017    Normal exam repeat in 5 years    COLONOSCOPY N/A 02/11/2019    Mild acute inflammation    COLONOSCOPY N/A 04/07/2024    2 areas at 10 and 20 cm with friability ulceration 2 clips placed at 20 cm and 4 clips at 10 cm poor prep normal mucosa,mild eroisions and ulcerations in visible vessels    COLONOSCOPY N/A 7/1/2024    Procedure: COLONOSCOPY WITH ANESTHESIA;  Surgeon: Arsalan Lorenzo DO;  Location: [facility] ENDOSCOPY;  Service: Gastroenterology;  Laterality: N/A;  pre op constipation/diarrhea  post poor prep  pcp Del Shetty    COLONOSCOPY N/A 7/2/2024    Procedure: COLONOSCOPY WITH ANESTHESIA;  Surgeon: Agapito Christopher MD;  Location: [facility] ENDOSCOPY;  Service: Gastroenterology;  Laterality: N/A;  pre rectal bleeding  post poor prep  pcp Del Shetty MD    COLONOSCOPY W/ POLYPECTOMY  03/04/2014    Hyperplastic polyp    CORONARY ARTERY BYPASS GRAFT  10/2015    ENDOSCOPY  04/13/2011    Gastritis with hemorrhage    ENDOSCOPY N/A 05/05/2017    Normal exam    ENDOSCOPY N/A 02/11/2019    Gastritis    ENDOSCOPY N/A 09/01/2020    Non-erosive gastritis with hemorrhage    ENDOSCOPY N/A 02/10/2021    Esophagitis    ENDOSCOPY N/A 04/11/2024    There were esophageal mucosal changes suspicious for short-segment Low's esophagus present in the distal esophagus. The maximum longitudinal extent of these mucosal changes was 2 cm in length. Mucosa was biopsied with a cold forceps for  histologyDistal esophagus, biopsies: Mild chronic active esophagogastritis. No evidence of intestinal metaplasia, dysplasia. Antrum, bx, Mild chronic gastritis    FOOT SURGERY Left     INCISION AND DRAINAGE OF WOUND Left 09/2007    spider bite     PT Assessment (Last 12 Hours)       PT Evaluation and Treatment       Row Name 11/29/24 1142          Physical Therapy Time and Intention    Subjective Information complains of;weakness;pain  -AE     Document Type therapy note (daily note)  -AE     Mode of Treatment physical therapy  -AE       Row Name 11/29/24 1142          General Information    Existing Precautions/Restrictions fall  -AE       Row Name 11/29/24 1142          Bed Mobility    Supine-Sit [place] (Bed Mobility) modified independence  -AE       Row Name 11/29/24 1142          Sit-Stand Transfer    Sit-Stand [place] (Transfers) contact guard;verbal cues  sit to stand x5 reps  -AE       Row Name 11/29/24 1142          Stand-Sit Transfer    Stand-Sit [place] (Transfers) standby assist;verbal cues  -AE       Row Name 11/29/24 1142          Gait/Stairs (Locomotion)    [place] Level (Gait) contact guard  -AE     Assistive Device (Gait) walker, front-wheeled  -AE     Distance in Feet (Gait) 40  -AE     Deviations/Abnormal Patterns (Gait) gait speed decreased  -AE     Bilateral Gait Deviations forward flexed posture  -AE     Comment, (Gait/Stairs) CAM boot donned and doffed  -AE       Row Name             Wound 08/22/23 0140 Left posterior plantar    Wound - Properties Group Placement Date: 08/22/23  -AM Placement Time: 0140  -AM Side: Left  -AM Orientation: posterior  -AM Location: plantar  -AM Present on Original Admission: Y  -AM    Retired Wound - Properties Group Placement Date: 08/22/23  -AM Placement Time: 0140  -AM Present on Original Admission: Y  -AM Side: Left  -AM Orientation: posterior  -AM Location: plantar  -AM    Retired Wound - Properties Group Placement Date: 08/22/23  -AM  Placement Time: 0140  -AM Present on Original Admission: Y  -AM Side: Left  -AM Orientation: posterior  -AM Location: plantar  -AM    Retired Wound - Properties Group Date first assessed: 08/22/23  -AM Time first assessed: 0140  -AM Present on Original Admission: Y  -AM Side: Left  -AM Location: plantar  -AM      Row Name             Wound 06/15/23 0102 Left anterior plantar    Wound - Properties Group Placement Date: 06/15/23  -SM Placement Time: 0102  -SM Side: Left  -SM Orientation: anterior  -SM Location: plantar  -SM Removal Date: --  -JR Removal Time: --  -JR    Retired Wound - Properties Group Placement Date: 06/15/23  -SM Placement Time: 0102  -SM Side: Left  -SM Orientation: anterior  -SM Location: plantar  -SM Removal Date: --  -JR Removal Time: --  -JR    Retired Wound - Properties Group Placement Date: 06/15/23  -SM Placement Time: 0102  -SM Side: Left  -SM Orientation: anterior  -SM Location: plantar  -SM Removal Date: --  -JR Removal Time: --  -JR    Retired Wound - Properties Group Date first assessed: 06/15/23  -SM Time first assessed: 0102  -SM Side: Left  -SM Location: plantar  -SM Resolution Date: --  -JR Resolution Time: --  -JR      Row Name             Wound 11/18/24 0046 Left lower arm skin tear;abrasion    Wound - Properties Group Placement Date: 11/18/24  -JR Placement Time: 0046  -JR Side: Left  -JR Orientation: lower  -JR Location: arm  -JR Primary Wound Type: Abrasion  -JR Type: skin tear;abrasion  -JR Present on Original Admission: Y  -JR    Retired Wound - Properties Group Placement Date: 11/18/24  -JR Placement Time: 0046  -JR Present on Original Admission: Y  -JR Side: Left  -JR Orientation: lower  -JR Location: arm  -JR Primary Wound Type: Abrasion  -JR Type: skin tear;abrasion  -JR    Retired Wound - Properties Group Placement Date: 11/18/24  -JR Placement Time: 0046  -JR Present on Original Admission: Y  -JR Side: Left  -JR Orientation: lower  -JR Location: arm  -JR Primary Wound  Type: Abrasion  -JR Type: skin tear;abrasion  -JR    Retired Wound - Properties Group Date first assessed: 11/18/24  -JR Time first assessed: 0046 -JR Present on Original Admission: Y  -JR Side: Left  -JR Location: arm  -JR Primary Wound Type: Abrasion  -JR Type: skin tear;abrasion  -JR      Row Name             Wound 11/18/24 0056 Left medial foot diabetic ulcer    Wound - Properties Group Placement Date: 11/18/24  -JR Placement Time: 0056 -JR Side: Left  -JR Orientation: medial  -JR Location: foot  -JR Primary Wound Type: Diabetic ulc  -JR Type: diabetic ulcer  -JR Present on Original Admission: Y  -JR    Retired Wound - Properties Group Placement Date: 11/18/24  -JR Placement Time: 0056 -JR Present on Original Admission: Y  -JR Side: Left  -JR Orientation: medial  -JR Location: foot  -JR Primary Wound Type: Diabetic ulc  -JR Type: diabetic ulcer  -JR    Retired Wound - Properties Group Placement Date: 11/18/24  -JR Placement Time: 0056 -JR Present on Original Admission: Y  -JR Side: Left  -JR Orientation: medial  -JR Location: foot  -JR Primary Wound Type: Diabetic ulc  -JR Type: diabetic ulcer  -JR    Retired Wound - Properties Group Date first assessed: 11/18/24  -JR Time first assessed: 0056 -JR Present on Original Admission: Y  -JR Side: Left  -JR Location: foot  -JR Primary Wound Type: Diabetic ulc  -JR Type: diabetic ulcer  -JR      Row Name 11/29/24 1142          Positioning and Restraints    Pre-Treatment Position in bed  -AE     Post Treatment Position chair  -AE     In Chair sitting;call light within reach;exit alarm on  -AE               User Key  (r) = Recorded By, (t) = Taken By, (c) = Cosigned By      Initials Name Provider Type    AE Hansa Arambula PTA Physical Therapist Assistant    Faviola Kunz RN Registered Nurse    Michelle Diaz RN Registered Nurse    Sabas Young LPNA Licensed Nurse    Sabas Young RN Registered Nurse                    Physical Therapy  Education       Title: PT OT SLP Therapies (In Progress)       Topic: Physical Therapy (Done)       Point: Mobility training (Done)       Learning Progress Summary            Patient Acceptance, E, VU by YANG at 11/27/2024 1343    Comment: continued role of PT, benefits of OOB activity, goal update and progress    Acceptance, E, NR by AE at 11/21/2024 0850    Comment: NWB L LE and need for CAM boot    Acceptance, E, VU by JACIEL at 11/19/2024 0958    Comment: limited gait until boot for RLE    Acceptance, E,TB,D, VU,NR by NIKO at 11/18/2024 1128    Comment: education re: purpose of PT/importance of activity, safety/falls prevention, NWB L LE due to open wounds, improved tech w/ tfers, positioning w/ L LE elevated                      Point: Home exercise program (Done)       Learning Progress Summary            Patient Acceptance, E, VU by YANG at 11/27/2024 1343    Comment: continued role of PT, benefits of OOB activity, goal update and progress    Acceptance, E, NR by AE at 11/21/2024 0850    Comment: NWB L LE and need for CAM boot                      Point: Precautions (Done)       Learning Progress Summary            Patient Acceptance, E, VU by YANG at 11/27/2024 1343    Comment: continued role of PT, benefits of OOB activity, goal update and progress    Acceptance, E,TB,D, VU,NR by NIKO at 11/18/2024 1128    Comment: education re: purpose of PT/importance of activity, safety/falls prevention, NWB L LE due to open wounds, improved tech w/ tfers, positioning w/ L LE elevated                                      User Key       Initials Effective Dates Name Provider Type Discipline    AE 02/03/23 -  Hansa Arambula, PTA Physical Therapist Assistant PT    JACIEL 02/03/23 -  Sabas Maharaj, PTA Physical Therapist Assistant PT     08/02/18 -  Melly Mustafa, PT Physical Therapist PT     08/15/24 -  Ronal Barry, URSZULA DPT Physical Therapist PT                  PT Recommendation and Plan     Progress: improving  Outcome  Evaluation: Pt was Independent for supine to sit and c/o L foot pain 4/10 post PT.PTA donned CAM boot on L LE.He was CGA to stand and to walk with RW 40ft with no LOB.Pt practiced sit to stand x 5 reps CGA.Pt would benefit from HH PT.       Time Calculation:    PT Charges       Row Name 11/29/24 1208             Time Calculation    Start Time 1142  -AE      Stop Time 1205  -AE      Time Calculation (min) 23 min  -AE      PT Received On 11/29/24  -AE      PT Goal Re-Cert Due Date 12/07/24  -AE         Time Calculation- PT    Total Timed Code Minutes- PT 23 minute(s)  -AE         Timed Charges    26595 - Gait Training Minutes  15  -AE      33781 - PT Therapeutic Activity Minutes 8  -AE         Total Minutes    Timed Charges Total Minutes 23  -AE       Total Minutes 23  -AE                User Key  (r) = Recorded By, (t) = Taken By, (c) = Cosigned By      Initials Name Provider Type    AE Hansa Arambula PTA Physical Therapist Assistant                  Therapy Charges for Today       Code Description Service Date Service Provider Modifiers Qty    18948675854 HC PT THERAPEUTIC ACT EA 15 MIN 11/29/2024 Hansa Arambula PTA GP 1    47598321104 HC GAIT TRAINING EA 15 MIN 11/29/2024 Hansa Arambula PTA GP 1            PT G-Codes  Outcome Measure Options: AM-PAC 6 Clicks Basic Mobility (PT)  AM-PAC 6 Clicks Score (PT): 17  AM-PAC 6 Clicks Score (OT): 20    Hansa Arambula PTA  11/29/2024

## 2024-11-29 NOTE — PROGRESS NOTES
Nephrology ([facility]) Progress Note      Patient:  Erick Luong  YOB: 1955  Date of Service: 11/29/2024  MRN: [id number]   Acct: [id number]   Primary Care Physician: Del Shetty MD  Advance Directive:   Code Status and Medical Interventions: CPR (Attempt to Resuscitate); Full Support   Ordered at: 11/18/24 0159     Code Status (Patient has no pulse and is not breathing):    CPR (Attempt to Resuscitate)     Medical Interventions (Patient has pulse or is breathing):    Full Support     Admit Date: 11/17/2024       Hospital Day: 10  Referring Provider: Garrett Alicia,*      Patient personally seen and examined.  Complete chart including Consults, Notes, Operative Reports, Labs, Cardiology, and Radiology studies reviewed as able.        Subjective:  Erick Luong is a 69 y.o. male for whom we were consulted for evaluation and treatment of acute kidney injury. Baseline chronic kidney disease stage 3b, baseline creatinine approximately 1.5-1.8.  Follows in our office. History of uncontrolled diabetes, hypertension, coronary artery disease, diabetic foot ulcer, obesity. Admitted with cystitis, foot wound and failure to thrive. Minimal PO intake recently. Had no specific complaints at time of nephrology initial exam. Hospital course remarkable for administration of IV fluids and reduction of Bumex dose. Bumex was stopped entirely afternoon of 11/21. Has been receiving IV fluids intermittently since then and renal function has been fluctuating.     Today, he is awake and alert. Having some non-productive cough. No overnight issues.  Urine output nonoliguric.     Allergies:  Cefepime, Bactrim [sulfamethoxazole-trimethoprim], Vancomycin, Zolpidem, and Metronidazole    Home Meds:  Medications Prior to Admission   Medication Sig Dispense Refill Last Dose/Taking    apixaban (Eliquis) 5 MG tablet tablet Take 1 tablet by mouth 2 (Two) Times a Day. 60 tablet 0 Taking     ascorbic acid (VITAMIN C) 1000 MG tablet Take 1 tablet by mouth Daily. 30 tablet 3 Taking    bumetanide (BUMEX) 2 MG tablet Take 1 tablet by mouth 2 (Two) Times a Day. 6 tablet 3 Taking    busPIRone (BUSPAR) 10 MG tablet Take 1 tablet by mouth 3 (Three) Times a Day As Needed. (Patient taking differently: Take 1 tablet by mouth 3 (Three) Times a Day As Needed (anxiety).) 270 tablet 4 Taking Differently    donepezil (ARICEPT) 10 MG tablet Take 1 tablet by mouth every night at bedtime. 30 tablet 0 Taking    DULoxetine (CYMBALTA) 60 MG capsule Take 1 capsule by mouth Daily. 30 capsule 0 Taking    famotidine (PEPCID) 20 MG tablet Take 1 tablet by mouth 2 (Two) Times a Day. 60 tablet 0 Taking    insulin glargine (Lantus) 100 UNIT/ML injection Inject 35 Units under the skin into the appropriate area as directed every night at bedtime. 10 mL 0 Taking    lisinopril (PRINIVIL,ZESTRIL) 5 MG tablet Take 1 tablet by mouth Daily. 30 tablet 0 Taking    melatonin 3 MG tablet Take 2 tablets by mouth At Night As Needed for Sleep. 30 tablet 0 Taking As Needed    midodrine (PROAMATINE) 2.5 MG tablet Take 1 tablet by mouth 2 (Two) Times a Day. 60 tablet 5 Taking    oxyCODONE (ROXICODONE) 10 MG tablet Take 1 tablet by mouth 2 (Two) Times a Day As Needed. (Patient taking differently: Take 1 tablet by mouth 2 (Two) Times a Day As Needed for Moderate Pain or Severe Pain. *must last 30 days*) 55 tablet 0 Taking Differently    pantoprazole (PROTONIX) 40 MG EC tablet Take 1 tablet by mouth 2 (Two) Times a Day. 60 tablet 0 Taking    polyethylene glycol (MIRALAX) 17 GM/SCOOP powder Take 17 g by mouth Daily As Needed (constipation).   Taking As Needed    potassium chloride ER (K-TAB) 20 MEQ tablet controlled-release ER tablet Take 1 tablet by mouth Daily. 30 tablet 0 Taking    rosuvastatin (CRESTOR) 10 MG tablet Take 1 tablet by mouth Daily. 30 tablet 0 Taking    sennosides-docusate (PERICOLACE) 8.6-50 MG per tablet Take 1 tablet by mouth Every  Night. Obtain OTC   Taking    tamsulosin (FLOMAX) 0.4 MG capsule 24 hr capsule Take 1 capsule by mouth Daily. 90 capsule 4 Taking    Insulin Lispro (humaLOG) 100 UNIT/ML injection Inject 2-12 Units under the skin into the appropriate area as directed 4 (Four) Times a Day Before Meals & at Bedtime. Inject subcutaneously four times a day per sliding scale:  0-150 = 0 units; 151-200 = 2u; 201-250 = 4u; 251-300 = 6u; 301-350 = 8u; 351-400 = 10u; 401+ = 12u       nitroglycerin (NITROSTAT) 0.4 MG SL tablet Place 1 tablet under the tongue Every 5 (Five) Minutes As Needed for Chest Pain. Take no more than 3 doses in 15 minutes.       pregabalin (LYRICA) 100 MG capsule Take 1 capsule by mouth Daily.       pregabalin (LYRICA) 100 MG capsule Take 2 capsules by mouth Every Night.       sodium hypochlorite (DAKIN'S 1/4 STRENGTH) 0.125 % solution topical solution 0.125% Apply  topically to the appropriate area as directed 2 (Two) Times a Day. 473 mL 5        Medicines:  Current Facility-Administered Medications   Medication Dose Route Frequency Provider Last Rate Last Admin    acetaminophen (TYLENOL) tablet 650 mg  650 mg Oral Q4H PRN Garrett Alicia MD        Or    acetaminophen (TYLENOL) 160 MG/5ML oral solution 650 mg  650 mg Oral Q4H PRN Garrett Alicia MD        Or    acetaminophen (TYLENOL) suppository 650 mg  650 mg Rectal Q4H PRN Garrett Alicia MD        apixaban (ELIQUIS) tablet 5 mg  5 mg Oral BID Garrett Alicia MD   5 mg at 11/29/24 0824    [Held by provider] ascorbic acid (VITAMIN C) tablet 500 mg  500 mg Oral BID Telly Rodriguez MD   500 mg at 11/20/24 0837    bisacodyl (DULCOLAX) EC tablet 5 mg  5 mg Oral Daily PRN Garrett Alicia MD   5 mg at 11/22/24 0924    And    bisacodyl (DULCOLAX) suppository 10 mg  10 mg Rectal Daily PRN Garrett Alicia MD   10 mg at 11/27/24 1136    [Held by provider] bumetanide (BUMEX) tablet 1 mg  1 mg Oral BID Telly Rodriguez MD   1  mg at 11/21/24 0914    busPIRone (BUSPAR) tablet 10 mg  10 mg Oral TID PRN Garrett Alicia MD   10 mg at 11/20/24 2055    Calcium Replacement - Follow Nurse / BPA Driven Protocol   Not Applicable PRN Telly Rodriguez MD        dextrose (D50W) (25 g/50 mL) IV injection 25 g  25 g Intravenous Q15 Min PRN Garrett Alicia MD        dextrose (GLUTOSE) oral gel 15 g  15 g Oral Q15 Min PRN Garrett Alicia MD   15 g at 11/19/24 0040    docusate sodium (COLACE) capsule 100 mg  100 mg Oral BID Telly Rodriguez MD   100 mg at 11/29/24 0824    [Held by provider] donepezil (ARICEPT) tablet 10 mg  10 mg Oral Nightly Garrett Alicia MD   10 mg at 11/18/24 0219    doxycycline (ADOXA) tablet 100 mg  100 mg Oral Q12H Germania Sheridan MD   100 mg at 11/29/24 0824    DULoxetine (CYMBALTA) DR capsule 30 mg  30 mg Oral Daily Telly Rodriguez MD   30 mg at 11/29/24 0823    famotidine (PEPCID) injection 20 mg  20 mg Intravenous Daily Telly Rodriguez MD   20 mg at 11/29/24 0823    glucagon (GLUCAGEN) injection 1 mg  1 mg Intramuscular Q15 Min PRN Garrett Alicia MD        guaiFENesin (MUCINEX) 12 hr tablet 1,200 mg  1,200 mg Oral Q12H Garrett Alicia MD   1,200 mg at 11/29/24 0823    insulin glargine (LANTUS, SEMGLEE) injection 20 Units  20 Units Subcutaneous Nightly Garrett Alicia MD   20 Units at 11/18/24 2143    Insulin Lispro (humaLOG) injection 10 Units  10 Units Subcutaneous TID With Meals Garrett Alicia MD   10 Units at 11/24/24 1705    Insulin Lispro (humaLOG) injection 2-9 Units  2-9 Units Subcutaneous 4x Daily AC & at Bedtime Garrett Alicia MD   4 Units at 11/28/24 2119    levoFLOXacin (LEVAQUIN) tablet 750 mg  750 mg Oral Every Other Day Germania Sheridan MD   750 mg at 11/29/24 1010    lisinopril (PRINIVIL,ZESTRIL) tablet 5 mg  5 mg Oral Daily Garrett Alicia MD   5 mg at 11/29/24 0823    Magnesium Standard Dose Replacement - Follow  Nurse / BPA Driven Protocol   Not Applicable PRN Telly Rodriguez MD        nitroglycerin (NITROSTAT) SL tablet 0.4 mg  0.4 mg Sublingual Q5 Min PRN Garrett Alicia MD        ondansetron (ZOFRAN) injection 4 mg  4 mg Intravenous Q6H PRN Garrett Alicia MD   4 mg at 11/29/24 1018    oxyCODONE-acetaminophen (PERCOCET) 5-325 MG per tablet 1 tablet  1 tablet Oral Q4H PRN Germania Sheridan MD   1 tablet at 11/29/24 0823    Phosphorus Replacement - Follow Nurse / BPA Driven Protocol   Not Applicable PRN Telly Rodriguez MD        polyethylene glycol (MIRALAX) packet 17 g  17 g Oral Daily Germania Sheridan MD   17 g at 11/29/24 0823    Potassium Replacement - Follow Nurse / BPA Driven Protocol   Not Applicable PRN Telly Rodriguez MD        pregabalin (LYRICA) capsule 75 mg  75 mg Oral Nightly Telly Rodriguez MD   75 mg at 11/28/24 2120    prochlorperazine (COMPAZINE) injection 5 mg  5 mg Intravenous Q6H PRN Telly Rodriguez MD   5 mg at 11/28/24 0413    rosuvastatin (CRESTOR) tablet 10 mg  10 mg Oral Daily Garrett Alicia MD   10 mg at 11/29/24 0823    sennosides-docusate (PERICOLACE) 8.6-50 MG per tablet 2 tablet  2 tablet Oral Nightly Germania Sheridan MD   2 tablet at 11/28/24 2120    sodium chloride 0.9 % flush 10 mL  10 mL Intravenous Q12H Garrett Alicia MD   10 mL at 11/29/24 1011    sodium chloride 0.9 % flush 10 mL  10 mL Intravenous PRN Garrett Alicia MD        sodium chloride 0.9 % infusion 40 mL  40 mL Intravenous PRN Garrett Alicia MD        tamsulosin (FLOMAX) 24 hr capsule 0.4 mg  0.4 mg Oral Daily Garrett Alicia MD   0.4 mg at 11/29/24 0823       Past Medical History:  Past Medical History:   Diagnosis Date    Arthritis     Autonomic disease     CAD (coronary artery disease) 02/06/2017    Cervical radiculopathy 09/16/2021    Chronic constipation with acute exaccerbation 05/10/2021    Coronary artery disease     Degeneration of cervical  intervertebral disc 08/11/2021    Diabetes mellitus     Diabetic foot ulcer 08/31/2020    Diabetic polyneuropathy associated with type 2 diabetes mellitus 01/18/2021    Elevated cholesterol     Gastroesophageal reflux disease 05/13/2019    Headache     HTN (hypertension), benign 05/03/2017    Hyperlipidemia     Hypertension     Mixed hyperlipidemia 02/07/2017    Multiple lung nodules 01/26/2020    5mm, 9 mm RLL identified 1/2020, not present 10/2019.    Myocardial infarction     Osteomyelitis 01/22/2020    Osteomyelitis of fifth toe of right foot 10/07/2019    Pancreatitis     Persistent insomnia 01/20/2020    Renal disorder     Sleep apnea 02/06/2017    Sleep apnea with use of continuous positive airway pressure (CPAP)     NON-COMPLIANT    Slow transit constipation 01/16/2019    Spinal stenosis in cervical region 09/16/2021    Vitamin D deficiency 03/02/2021       Past Surgical History:  Past Surgical History:   Procedure Laterality Date    ABDOMINAL SURGERY      AMPUTATION FOOT / TOE Left 10/2021    5th digit     ANTERIOR CERVICAL DISCECTOMY W/ FUSION N/A 08/05/2022    Procedure: CERVICAL DISCECTOMY ANTERIOR WITH FUSION C5-6 with possible plating of C5-7 with neuromonitoring and 1 c-arm;  Surgeon: Karel Soliz MD;  Location:  [facility] OR;  Service: Neurosurgery;  Laterality: N/A;    APPENDECTOMY      BACK SURGERY      CARDIAC CATHETERIZATION Left 02/08/2021    Procedure: Left Heart Cath w poss intervention left anatomical snuff box acess;  Surgeon: Omkar Charles DO;  Location:  [facility] CATH INVASIVE LOCATION;  Service: Cardiology;  Laterality: Left;    CARDIAC SURGERY      CATARACT EXTRACTION      CERVICAL SPINE SURGERY      COLONOSCOPY N/A 01/31/2017    Normal exam repeat in 5 years    COLONOSCOPY N/A 02/11/2019    Mild acute inflammation    COLONOSCOPY N/A 04/07/2024    2 areas at 10 and 20 cm with friability ulceration 2 clips placed at 20 cm and 4 clips at 10 cm poor prep normal mucosa,mild  eroisions and ulcerations in visible vessels    COLONOSCOPY N/A 2024    Procedure: COLONOSCOPY WITH ANESTHESIA;  Surgeon: Arsalan Lorenzo DO;  Location: [facility] ENDOSCOPY;  Service: Gastroenterology;  Laterality: N/A;  pre op constipation/diarrhea  post poor prep  pcp Del Shetty    COLONOSCOPY N/A 2024    Procedure: COLONOSCOPY WITH ANESTHESIA;  Surgeon: Agapito Christopher MD;  Location: [facility] ENDOSCOPY;  Service: Gastroenterology;  Laterality: N/A;  pre rectal bleeding  post poor prep  pcp Del Shetty MD    COLONOSCOPY W/ POLYPECTOMY  2014    Hyperplastic polyp    CORONARY ARTERY BYPASS GRAFT  10/2015    ENDOSCOPY  2011    Gastritis with hemorrhage    ENDOSCOPY N/A 2017    Normal exam    ENDOSCOPY N/A 2019    Gastritis    ENDOSCOPY N/A 2020    Non-erosive gastritis with hemorrhage    ENDOSCOPY N/A 02/10/2021    Esophagitis    ENDOSCOPY N/A 2024    There were esophageal mucosal changes suspicious for short-segment Low's esophagus present in the distal esophagus. The maximum longitudinal extent of these mucosal changes was 2 cm in length. Mucosa was biopsied with a cold forceps for histologyDistal esophagus, biopsies: Mild chronic active esophagogastritis. No evidence of intestinal metaplasia, dysplasia. Antrum, bx, Mild chronic gastritis    FOOT SURGERY Left     INCISION AND DRAINAGE OF WOUND Left 2007    spider bite       Family History  Family History   Problem Relation Age of Onset    Colon cancer Father     Heart disease Father     Colon cancer Sister     Colon polyps Sister     Alzheimer's disease Mother     Coronary artery disease Sister     Coronary artery disease Sister        Social History  Social History     Socioeconomic History    Marital status:    Tobacco Use    Smoking status: Former     Current packs/day: 0.00     Types: Cigarettes     Quit date:      Years since quittin.9    Smokeless tobacco: Never    Tobacco comments:      smoked in highschool   Vaping Use    Vaping status: Never Used   Substance and Sexual Activity    Alcohol use: No    Drug use: No    Sexual activity: Defer       Review of Systems:  History obtained from chart review and the patient  General ROS: No fever or chills  Respiratory ROS: No cough, shortness of breath, wheezing  Cardiovascular ROS: No chest pain or palpitations  Gastrointestinal ROS: No abdominal pain or melena  Genito-Urinary ROS: No dysuria or hematuria  Psych ROS: No anxiety and depression  14 point ROS reviewed with the patient and negative except as noted above and in the HPI unless unable to obtain.    Objective:  Patient Vitals for the past 24 hrs:   BP Temp Temp src Pulse Resp SpO2   11/29/24 1204 114/67 97.4 °F (36.3 °C) Oral 81 16 97 %   11/29/24 0609 141/67 98.4 °F (36.9 °C) Oral 81 16 98 %   11/28/24 2253 -- 97.9 °F (36.6 °C) Oral -- -- --   11/28/24 1905 117/68 98.4 °F (36.9 °C) Oral 70 15 96 %   11/28/24 1431 135/65 97.7 °F (36.5 °C) Oral 68 16 98 %       Intake/Output Summary (Last 24 hours) at 11/29/2024 1430  Last data filed at 11/29/2024 0943  Gross per 24 hour   Intake 120 ml   Output 1600 ml   Net -1480 ml     General: awake/alert   Chest:  clear to auscultation bilaterally without respiratory distress  CVS: regular rate and rhythm  Abdominal: soft, nontender, positive bowel sounds  Extremities: no cyanosis or edema  Skin: warm and dry without rash      Labs:  Results from last 7 days   Lab Units 11/29/24  0600 11/28/24  0424 11/27/24  0301   WBC 10*3/mm3 4.55 5.32 7.23   HEMOGLOBIN g/dL 10.1* 10.5* 11.1*   HEMATOCRIT % 31.9* 32.4* 34.3*   PLATELETS 10*3/mm3 183 197 260         Results from last 7 days   Lab Units 11/29/24  0600 11/28/24  0424 11/27/24  0301   SODIUM mmol/L 138 137 140   POTASSIUM mmol/L 4.8 4.3 4.7   CHLORIDE mmol/L 106 104 108*   CO2 mmol/L 23.0 20.0* 22.0   BUN mg/dL 41* 35* 39*   CREATININE mg/dL 2.27* 2.23* 2.21*   CALCIUM mg/dL 8.4* 8.4* 8.8   EGFR  mL/min/1.73 30.7* 31.3* 31.7*   GLUCOSE mg/dL 131* 125* 195*       Radiology:   Imaging Results (Last 72 Hours)       Procedure Component Value Units Date/Time    XR Abdomen KUB [[id number]] Collected: 11/27/24 1632     Updated: 11/27/24 1642    Narrative:      EXAMINATION: XR ABDOMEN KUB- 11/27/2024 4:07 PM     HISTORY: abdominal pain, constipation, nausea; N39.0-Urinary tract  infection, site not specified; N17.9-Acute kidney failure, unspecified;  N18.9-Chronic kidney disease, unspecified; R73.9-Hyperglycemia,  unspecified; Z74.09-Other reduced mobility.     REPORT: Supine imaging of the abdomen was performed.     COMPARISON: KUB 4/24/2024.     Moderate to large volume of stool seen throughout the colon, as well as  a moderate volume of gas and there is mild gaseous distention of a  central colonic loop, probably the sigmoid colon. This has a diameter of  10 cm. Cholecystectomy clips are present. No evidence of free air on  this limited supine view. No acute osseous abnormality.       Impression:      Moderate constipation and probable mild colonic ileus.     This report was signed and finalized on 11/27/2024 4:39 PM by Dr. Percy Cesar MD.       XR Chest 2 View [[id number]] Collected: 11/27/24 1606     Updated: 11/27/24 1609    Narrative:      EXAMINATION: XR CHEST 2 VW-  11/27/2024 4:06 PM     HISTORY: Wheezing and cough.     FINDINGS: 2 view exam of the chest demonstrates left lower lobe  pneumonia. The lungs are otherwise clear. No effusion or free air  present. The patient has undergone prior median sternotomy.       Impression:      1. Left lower lobe pneumonia.     This report was signed and finalized on 11/27/2024 4:06 PM by Dr. Phillip Hart MD.               Culture:  Blood Culture   Date Value Ref Range Status   11/18/2024 No growth at 3 days  Preliminary   11/17/2024 No growth at 3 days  Preliminary     Urine Culture   Date Value Ref Range Status   11/17/2024 >100,000 CFU/mL Escherichia coli (A)   Preliminary         Assessment    Acute kidney injury--improving  Baseline chronic kidney disease stage 3b  Hypertension  Uncontrolled type 2 diabetes (A1c 13.4%)  Anemia of CKD  Metabolic acidosis--improved  Acute cystitis  Obesity     Plan:  Renal function stable   Monitor labs  Stable for discharge form renal standpoint.       Vinny Hartley MD  11/29/2024  14:30 CST

## 2024-11-29 NOTE — PLAN OF CARE
Problem: Adult Inpatient Plan of Care  Goal: Plan of Care Review  Outcome: Progressing  Flowsheets (Taken 11/29/2024 3309)  Progress: no change  Outcome Evaluation: Patient rested well. Complains of nausea and pain x1 this far. Up x1, voiding. Accu checks. IV abx infusing per order, otherwise IID. VSS. Safety maintained.  Plan of Care Reviewed With: patient

## 2024-11-30 ENCOUNTER — APPOINTMENT (OUTPATIENT)
Dept: GENERAL RADIOLOGY | Facility: HOSPITAL | Age: 69
DRG: 682 | End: 2024-11-30
Payer: MEDICARE

## 2024-11-30 LAB
ANION GAP SERPL CALCULATED.3IONS-SCNC: 9 MMOL/L (ref 5–15)
BUN SERPL-MCNC: 44 MG/DL (ref 8–23)
BUN/CREAT SERPL: 18.9 (ref 7–25)
CALCIUM SPEC-SCNC: 8.2 MG/DL (ref 8.6–10.5)
CHLORIDE SERPL-SCNC: 108 MMOL/L (ref 98–107)
CO2 SERPL-SCNC: 22 MMOL/L (ref 22–29)
CREAT SERPL-MCNC: 2.33 MG/DL (ref 0.76–1.27)
DEPRECATED RDW RBC AUTO: 50.7 FL (ref 37–54)
EGFRCR SERPLBLD CKD-EPI 2021: 29.7 ML/MIN/1.73
ERYTHROCYTE [DISTWIDTH] IN BLOOD BY AUTOMATED COUNT: 15.9 % (ref 12.3–15.4)
GLUCOSE BLDC GLUCOMTR-MCNC: 141 MG/DL (ref 70–130)
GLUCOSE BLDC GLUCOMTR-MCNC: 186 MG/DL (ref 70–130)
GLUCOSE BLDC GLUCOMTR-MCNC: 191 MG/DL (ref 70–130)
GLUCOSE SERPL-MCNC: 144 MG/DL (ref 65–99)
HCT VFR BLD AUTO: 30.5 % (ref 37.5–51)
HGB BLD-MCNC: 9.7 G/DL (ref 13–17.7)
MCH RBC QN AUTO: 28.1 PG (ref 26.6–33)
MCHC RBC AUTO-ENTMCNC: 31.8 G/DL (ref 31.5–35.7)
MCV RBC AUTO: 88.4 FL (ref 79–97)
PLATELET # BLD AUTO: 191 10*3/MM3 (ref 140–450)
PMV BLD AUTO: 11.6 FL (ref 6–12)
POTASSIUM SERPL-SCNC: 4.6 MMOL/L (ref 3.5–5.2)
RBC # BLD AUTO: 3.45 10*6/MM3 (ref 4.14–5.8)
SODIUM SERPL-SCNC: 139 MMOL/L (ref 136–145)
WBC NRBC COR # BLD AUTO: 5.39 10*3/MM3 (ref 3.4–10.8)

## 2024-11-30 PROCEDURE — 73090 X-RAY EXAM OF FOREARM: CPT

## 2024-11-30 PROCEDURE — 25010000002 ONDANSETRON PER 1 MG: Performed by: FAMILY MEDICINE

## 2024-11-30 PROCEDURE — 73030 X-RAY EXAM OF SHOULDER: CPT

## 2024-11-30 PROCEDURE — 80048 BASIC METABOLIC PNL TOTAL CA: CPT | Performed by: FAMILY MEDICINE

## 2024-11-30 PROCEDURE — 97116 GAIT TRAINING THERAPY: CPT

## 2024-11-30 PROCEDURE — 85027 COMPLETE CBC AUTOMATED: CPT | Performed by: FAMILY MEDICINE

## 2024-11-30 PROCEDURE — 82948 REAGENT STRIP/BLOOD GLUCOSE: CPT

## 2024-11-30 PROCEDURE — 97110 THERAPEUTIC EXERCISES: CPT

## 2024-11-30 PROCEDURE — 63710000001 INSULIN LISPRO (HUMAN) PER 5 UNITS: Performed by: FAMILY MEDICINE

## 2024-11-30 RX ADMIN — APIXABAN 5 MG: 5 TABLET, FILM COATED ORAL at 20:58

## 2024-11-30 RX ADMIN — ONDANSETRON 4 MG: 2 INJECTION INTRAMUSCULAR; INTRAVENOUS at 15:26

## 2024-11-30 RX ADMIN — DOXYCYCLINE 100 MG: 100 TABLET ORAL at 09:01

## 2024-11-30 RX ADMIN — INSULIN LISPRO 2 UNITS: 100 INJECTION, SOLUTION INTRAVENOUS; SUBCUTANEOUS at 20:57

## 2024-11-30 RX ADMIN — PREGABALIN 75 MG: 75 CAPSULE ORAL at 20:58

## 2024-11-30 RX ADMIN — OXYCODONE HYDROCHLORIDE AND ACETAMINOPHEN 1 TABLET: 5; 325 TABLET ORAL at 06:25

## 2024-11-30 RX ADMIN — TAMSULOSIN HYDROCHLORIDE 0.4 MG: 0.4 CAPSULE ORAL at 09:01

## 2024-11-30 RX ADMIN — GUAIFENESIN 1200 MG: 600 TABLET, EXTENDED RELEASE ORAL at 09:01

## 2024-11-30 RX ADMIN — APIXABAN 5 MG: 5 TABLET, FILM COATED ORAL at 09:01

## 2024-11-30 RX ADMIN — FAMOTIDINE 20 MG: 10 INJECTION INTRAVENOUS at 09:02

## 2024-11-30 RX ADMIN — OXYCODONE HYDROCHLORIDE AND ACETAMINOPHEN 1 TABLET: 5; 325 TABLET ORAL at 20:57

## 2024-11-30 RX ADMIN — DOCUSATE SODIUM 100 MG: 100 CAPSULE, LIQUID FILLED ORAL at 20:57

## 2024-11-30 RX ADMIN — DOCUSATE SODIUM 50 MG AND SENNOSIDES 8.6 MG 2 TABLET: 8.6; 5 TABLET, FILM COATED ORAL at 20:58

## 2024-11-30 RX ADMIN — POLYETHYLENE GLYCOL 3350 17 G: 17 POWDER, FOR SOLUTION ORAL at 09:02

## 2024-11-30 RX ADMIN — DOXYCYCLINE 100 MG: 100 TABLET ORAL at 20:57

## 2024-11-30 RX ADMIN — LISINOPRIL 5 MG: 10 TABLET ORAL at 09:01

## 2024-11-30 RX ADMIN — Medication 10 ML: at 20:58

## 2024-11-30 RX ADMIN — ROSUVASTATIN 10 MG: 10 TABLET, FILM COATED ORAL at 09:01

## 2024-11-30 RX ADMIN — OXYCODONE HYDROCHLORIDE AND ACETAMINOPHEN 1 TABLET: 5; 325 TABLET ORAL at 15:26

## 2024-11-30 RX ADMIN — INSULIN LISPRO 2 UNITS: 100 INJECTION, SOLUTION INTRAVENOUS; SUBCUTANEOUS at 12:12

## 2024-11-30 RX ADMIN — OXYCODONE HYDROCHLORIDE AND ACETAMINOPHEN 1 TABLET: 5; 325 TABLET ORAL at 01:23

## 2024-11-30 RX ADMIN — GUAIFENESIN 1200 MG: 600 TABLET, EXTENDED RELEASE ORAL at 20:57

## 2024-11-30 RX ADMIN — DULOXETINE HYDROCHLORIDE 30 MG: 30 CAPSULE, DELAYED RELEASE ORAL at 09:01

## 2024-11-30 RX ADMIN — DOCUSATE SODIUM 100 MG: 100 CAPSULE, LIQUID FILLED ORAL at 09:01

## 2024-11-30 NOTE — PROGRESS NOTES
[facility]  INPATIENT PROGRESS NOTE    Patient Name: Erick Luong  Date of Admission: 11/17/2024  Today's Date: 11/30/24  Length of Stay: 11  Primary Care Physician: Del Shetty MD    Subjective   Chief Complaint: AMS/Failure to thrive    Yesterday afternoon the patient had a fall when coming back from the bathroom.  He landed on his right side and has abrasions on his right arm and shoulder.  He did not hit his head.  X-rays of shoulder and arm are negative for fractures.  He only complains of some soreness but no pain.  Prior to the fall he denies feeling dizzy or lightheaded.    Review of Systems   All pertinent negatives and positives are as above. All other systems have been reviewed and are negative unless otherwise stated.     Objective    Temp:  [97.4 °F (36.3 °C)-98.3 °F (36.8 °C)] 98.3 °F (36.8 °C)  Heart Rate:  [59-84] 61  Resp:  [16] 16  BP: (113-126)/(54-67) 125/65  Physical Exam  GEN: Awake, alert, interactive, in NAD, obese  HEENT: Atraumatic,  EOMI, Anicteric,   Lungs: Inspiratory wheeze on right side improved  Heart: RRR, +S1/s2, no rub  ABD: soft, nontender, +BS, no guarding/rebound  Extremities: atraumatic, no cyanosis, no edema  Skin: bilateral wounds on feet.  Dry skin abrasion on right shoulder.  Abrasion on right forearm covered in clean dry dressing.  Neuro: AAOx3, no focal deficits  Psych: normal mood & affect        Results Review:  I have reviewed the labs, radiology results, and diagnostic studies.    Laboratory Data:   Results from last 7 days   Lab Units 11/30/24  0606 11/29/24  0600 11/28/24  0424   WBC 10*3/mm3 5.39 4.55 5.32   HEMOGLOBIN g/dL 9.7* 10.1* 10.5*   HEMATOCRIT % 30.5* 31.9* 32.4*   PLATELETS 10*3/mm3 191 183 197        Results from last 7 days   Lab Units 11/30/24  0606 11/29/24  0600 11/28/24  0424   SODIUM mmol/L 139 138 137   POTASSIUM mmol/L 4.6 4.8 4.3   CHLORIDE mmol/L 108* 106 104   CO2 mmol/L 22.0 23.0  20.0*   BUN mg/dL 44* 41* 35*   CREATININE mg/dL 2.33* 2.27* 2.23*   CALCIUM mg/dL 8.2* 8.4* 8.4*   GLUCOSE mg/dL 144* 131* 125*       Culture Data:   [unfilled]    Radiology Data:   Imaging Results (Last 24 Hours)       ** No results found for the last 24 hours. **            I have reviewed the patient's current medications.     Assessment/Plan   Assessment  Active Hospital Problems    Diagnosis     **Acute UTI (urinary tract infection)     Acute encephalopathy     Type 2 diabetes mellitus with hyperglycemia, with long-term current use of insulin     Atrial fibrillation     Chronic heart failure with preserved ejection fraction (HFpEF)     Stage 3b chronic kidney disease     CAD (coronary artery disease)     BPH without obstruction/lower urinary tract symptoms     Diabetic ulcer of left foot associated with type 2 diabetes mellitus     Sleep apnea with use of continuous positive airway pressure (CPAP)     Essential hypertension      Erick Luong is a 69 y.o. male with past medical history of coronary artery disease, CABG, Afib, diabetes mellitus insulin-dependent, diabetic foot ulcer, hypertension, sleep apnea noncompliant with CPAP, right seventh rib fracture on 10/20/2024, presents emergency room with complaints of mental status changes disorientation suprapubic pain and burning with urination.     Treatment Plan:    #  Left lower lobe pneumonia  # Hospital-acquired pneumonia  Some wheezing noted on physical exam 11/27  Chest x-ray showing left lower lobe pneumonia  Respiratory PCR panel negative  MRSA screen positive   Allergic to Vancomycin.  Cannot use Linezolid due to concurrent use of Cymbalta and BuSpar which increases risk of serotonin syndrome.  - MRSA coverage with Doxycyline - started 11/28  - PO Levaquin for Pseudomonas coverage - renal dosing q48h  - Patient has stable vitals, WBC 4, feeling better - can be on oral regimen     # ESBL E. coli UTI - completed 10 days of Ertapenem    # WANDER on CKD 3b    Cr 2.83 >>> 3.00 > 2.55 > 2.15 > 2.3 today   - Nephrology following  --recommend encouraging p.o. intake  - Bumex on hold per nephro    # Wounds on Bilateral Feet  - Evaluated by wound care - following     # GLF   In hospital 11/29  No head trauma, no LOW   Xray right shoulder and right forearm no fracture  Abrasions present     # Hypertension - continue lisinopril  # Hyperlipidemia - continue crestor  # Diabetes mellitus - refusing lantus 20, lispro 10/10/10, agrees to some SSI   # Depression - continue Buspar and Cymbalta  # A-fib - continue Eliquis  # Constipation - docusate and pericolace.    # BPH - continue home flomax         Medical Decision Making  Number and Complexity of problems: 1 acute, 1 acute on chronic, others chronic  Differential Diagnosis: As above    Conditions and Status       Condition is improving.  Stable.     MDM Data  External documents reviewed: Reviewed  Cardiac tracing (EKG, telemetry) interpretation: Reviewed  Radiology interpretation: Reviewed  Labs reviewed: As above  Any tests that were considered but not ordered: None     Decision rules/scores evaluated (example [id number], [place], etc): None     Discussed with: Patient      Care Planning  Shared decision making: Discussed with patient   Code status and discussions: Full code    Disposition  Social Determinants of Health that impact treatment or disposition: None  I expect the patient to be discharged to home in TBD days.         Electronically signed by Germania Sheridan MD, 11/30/24, 09:53 CST.

## 2024-11-30 NOTE — PLAN OF CARE
Goal Outcome Evaluation:  Plan of Care Reviewed With: patient           Outcome Evaluation: Patient resting comfortably. IID, voiding, up with assistance, X-Ray complete. BG elevated SS insulin given. Dressing changed to foot & arms per order. Safety maintained

## 2024-11-30 NOTE — PLAN OF CARE
Goal Outcome Evaluation:  Plan of Care Reviewed With: patient        Progress: improving  Outcome Evaluation: Pt was Independent for supine to sit and c/o R flank pain and neck pain 4/10 post PT.PTA donned CAM boot on L LE.He was CGA to stand and CGA-min x 1 to walk with RW 50ft.Pt c/o of feeling shaky and slightly weaker today.Pt was unsure but thought it might be due to nervousness.Pt would benefit from  PT.

## 2024-11-30 NOTE — PLAN OF CARE
Problem: Adult Inpatient Plan of Care  Goal: Plan of Care Review  Outcome: Progressing  Flowsheets (Taken 11/30/2024 3730)  Progress: improving  Outcome Evaluation: Patient rested well. Complains of pain x3 and nausea x1. ACHS accu checks. AOx4. Voiding. VSS. Contact precautions maintained, safety maintained. Bed alarm on.

## 2024-11-30 NOTE — PROGRESS NOTES
Nephrology ([facility]) Progress Note      Patient:  Erick Luong  YOB: 1955  Date of Service: 11/30/2024  MRN: [id number]   Acct: [id number]   Primary Care Physician: Del Shetty MD  Advance Directive:   Code Status and Medical Interventions: CPR (Attempt to Resuscitate); Full Support   Ordered at: 11/18/24 0159     Code Status (Patient has no pulse and is not breathing):    CPR (Attempt to Resuscitate)     Medical Interventions (Patient has pulse or is breathing):    Full Support     Admit Date: 11/17/2024       Hospital Day: 11  Referring Provider: Garrett Alicia,*      Patient personally seen and examined.  Complete chart including Consults, Notes, Operative Reports, Labs, Cardiology, and Radiology studies reviewed as able.        Subjective:  Erick Luong is a 69 y.o. male for whom we were consulted for evaluation and treatment of acute kidney injury. Baseline chronic kidney disease stage 3b, baseline creatinine approximately 1.5-1.8.  Follows in our office. History of uncontrolled diabetes, hypertension, coronary artery disease, diabetic foot ulcer, obesity. Admitted with cystitis, foot wound and failure to thrive. Minimal PO intake recently. Had no specific complaints at time of nephrology initial exam. Hospital course remarkable for administration of IV fluids and reduction of Bumex dose. Bumex was stopped entirely afternoon of 11/21. Has been receiving IV fluids intermittently since then and renal function has been fluctuating.     Today, he is awake and alert. Has been weak and has sustained a fall on 11/29.    Allergies:  Cefepime, Bactrim [sulfamethoxazole-trimethoprim], Vancomycin, Zolpidem, and Metronidazole    Home Meds:  Medications Prior to Admission   Medication Sig Dispense Refill Last Dose/Taking    apixaban (Eliquis) 5 MG tablet tablet Take 1 tablet by mouth 2 (Two) Times a Day. 60 tablet 0 Taking    ascorbic acid (VITAMIN C) 1000 MG  tablet Take 1 tablet by mouth Daily. 30 tablet 3 Taking    bumetanide (BUMEX) 2 MG tablet Take 1 tablet by mouth 2 (Two) Times a Day. 6 tablet 3 Taking    busPIRone (BUSPAR) 10 MG tablet Take 1 tablet by mouth 3 (Three) Times a Day As Needed. (Patient taking differently: Take 1 tablet by mouth 3 (Three) Times a Day As Needed (anxiety).) 270 tablet 4 Taking Differently    donepezil (ARICEPT) 10 MG tablet Take 1 tablet by mouth every night at bedtime. 30 tablet 0 Taking    DULoxetine (CYMBALTA) 60 MG capsule Take 1 capsule by mouth Daily. 30 capsule 0 Taking    famotidine (PEPCID) 20 MG tablet Take 1 tablet by mouth 2 (Two) Times a Day. 60 tablet 0 Taking    insulin glargine (Lantus) 100 UNIT/ML injection Inject 35 Units under the skin into the appropriate area as directed every night at bedtime. 10 mL 0 Taking    lisinopril (PRINIVIL,ZESTRIL) 5 MG tablet Take 1 tablet by mouth Daily. 30 tablet 0 Taking    melatonin 3 MG tablet Take 2 tablets by mouth At Night As Needed for Sleep. 30 tablet 0 Taking As Needed    midodrine (PROAMATINE) 2.5 MG tablet Take 1 tablet by mouth 2 (Two) Times a Day. 60 tablet 5 Taking    oxyCODONE (ROXICODONE) 10 MG tablet Take 1 tablet by mouth 2 (Two) Times a Day As Needed. (Patient taking differently: Take 1 tablet by mouth 2 (Two) Times a Day As Needed for Moderate Pain or Severe Pain. *must last 30 days*) 55 tablet 0 Taking Differently    pantoprazole (PROTONIX) 40 MG EC tablet Take 1 tablet by mouth 2 (Two) Times a Day. 60 tablet 0 Taking    polyethylene glycol (MIRALAX) 17 GM/SCOOP powder Take 17 g by mouth Daily As Needed (constipation).   Taking As Needed    potassium chloride ER (K-TAB) 20 MEQ tablet controlled-release ER tablet Take 1 tablet by mouth Daily. 30 tablet 0 Taking    rosuvastatin (CRESTOR) 10 MG tablet Take 1 tablet by mouth Daily. 30 tablet 0 Taking    sennosides-docusate (PERICOLACE) 8.6-50 MG per tablet Take 1 tablet by mouth Every Night. Obtain OTC   Taking     tamsulosin (FLOMAX) 0.4 MG capsule 24 hr capsule Take 1 capsule by mouth Daily. 90 capsule 4 Taking    Insulin Lispro (humaLOG) 100 UNIT/ML injection Inject 2-12 Units under the skin into the appropriate area as directed 4 (Four) Times a Day Before Meals & at Bedtime. Inject subcutaneously four times a day per sliding scale:  0-150 = 0 units; 151-200 = 2u; 201-250 = 4u; 251-300 = 6u; 301-350 = 8u; 351-400 = 10u; 401+ = 12u       nitroglycerin (NITROSTAT) 0.4 MG SL tablet Place 1 tablet under the tongue Every 5 (Five) Minutes As Needed for Chest Pain. Take no more than 3 doses in 15 minutes.       pregabalin (LYRICA) 100 MG capsule Take 1 capsule by mouth Daily.       pregabalin (LYRICA) 100 MG capsule Take 2 capsules by mouth Every Night.       sodium hypochlorite (DAKIN'S 1/4 STRENGTH) 0.125 % solution topical solution 0.125% Apply  topically to the appropriate area as directed 2 (Two) Times a Day. 473 mL 5        Medicines:  Current Facility-Administered Medications   Medication Dose Route Frequency Provider Last Rate Last Admin    acetaminophen (TYLENOL) tablet 650 mg  650 mg Oral Q4H PRN Garrett Alicia MD        Or    acetaminophen (TYLENOL) 160 MG/5ML oral solution 650 mg  650 mg Oral Q4H PRN Garrett Alicia MD        Or    acetaminophen (TYLENOL) suppository 650 mg  650 mg Rectal Q4H PRN Garrett Alicia MD        apixaban (ELIQUIS) tablet 5 mg  5 mg Oral BID Garrett Alicia MD   5 mg at 11/30/24 0901    [Held by provider] ascorbic acid (VITAMIN C) tablet 500 mg  500 mg Oral BID Telly Rodriguez MD   500 mg at 11/20/24 0837    bisacodyl (DULCOLAX) EC tablet 5 mg  5 mg Oral Daily PRN Garrett Alicia MD   5 mg at 11/22/24 0924    And    bisacodyl (DULCOLAX) suppository 10 mg  10 mg Rectal Daily PRN Garrett Alicia MD   10 mg at 11/27/24 1136    [Held by provider] bumetanide (BUMEX) tablet 1 mg  1 mg Oral BID Telly Rodriguez MD   1 mg at 11/21/24 0969     busPIRone (BUSPAR) tablet 10 mg  10 mg Oral TID PRN Garrett Alicia MD   10 mg at 11/20/24 2055    Calcium Replacement - Follow Nurse / BPA Driven Protocol   Not Applicable PRN Telly Rodriguez MD        dextrose (D50W) (25 g/50 mL) IV injection 25 g  25 g Intravenous Q15 Min PRN Garrett Alicia MD        dextrose (GLUTOSE) oral gel 15 g  15 g Oral Q15 Min PRN Garrett Alicia MD   15 g at 11/19/24 0040    docusate sodium (COLACE) capsule 100 mg  100 mg Oral BID Telly Rodriguez MD   100 mg at 11/30/24 0901    [Held by provider] donepezil (ARICEPT) tablet 10 mg  10 mg Oral Nightly Garrett Alicia MD   10 mg at 11/18/24 0219    doxycycline (ADOXA) tablet 100 mg  100 mg Oral Q12H Germania Sheridan MD   100 mg at 11/30/24 0901    DULoxetine (CYMBALTA) DR capsule 30 mg  30 mg Oral Daily Telly Rodriguez MD   30 mg at 11/30/24 0901    famotidine (PEPCID) injection 20 mg  20 mg Intravenous Daily Telly Rodriguez MD   20 mg at 11/30/24 0902    glucagon (GLUCAGEN) injection 1 mg  1 mg Intramuscular Q15 Min PRN Garrett Alicia MD        guaiFENesin (MUCINEX) 12 hr tablet 1,200 mg  1,200 mg Oral Q12H Garrett Alicia MD   1,200 mg at 11/30/24 0901    insulin glargine (LANTUS, SEMGLEE) injection 20 Units  20 Units Subcutaneous Nightly Garrett Alicia MD   20 Units at 11/18/24 2143    Insulin Lispro (humaLOG) injection 10 Units  10 Units Subcutaneous TID With Meals Garrett Alicia MD   10 Units at 11/24/24 1705    Insulin Lispro (humaLOG) injection 2-9 Units  2-9 Units Subcutaneous 4x Daily AC & at Bedtime Garrett Alicia MD   2 Units at 11/30/24 1212    levoFLOXacin (LEVAQUIN) tablet 750 mg  750 mg Oral Every Other Day Germania Sheridan MD   750 mg at 11/29/24 1010    lisinopril (PRINIVIL,ZESTRIL) tablet 5 mg  5 mg Oral Daily Garrett Alicia MD   5 mg at 11/30/24 0901    Magnesium Standard Dose Replacement - Follow Nurse / BPA Driven  Protocol   Not Applicable PRN Telly Rodriguez MD        nitroglycerin (NITROSTAT) SL tablet 0.4 mg  0.4 mg Sublingual Q5 Min PRN Garrett Alicia MD        ondansetron (ZOFRAN) injection 4 mg  4 mg Intravenous Q6H PRN Garrett Alicia MD   4 mg at 11/30/24 1526    oxyCODONE-acetaminophen (PERCOCET) 5-325 MG per tablet 1 tablet  1 tablet Oral Q4H PRN Germania Sheridan MD   1 tablet at 11/30/24 1526    Phosphorus Replacement - Follow Nurse / BPA Driven Protocol   Not Applicable PRN Telly Rodriguez MD        polyethylene glycol (MIRALAX) packet 17 g  17 g Oral Daily Germania Sheridan MD   17 g at 11/30/24 0902    Potassium Replacement - Follow Nurse / BPA Driven Protocol   Not Applicable PRN Telly Rodriguez MD        pregabalin (LYRICA) capsule 75 mg  75 mg Oral Nightly Telly Rodriguez MD   75 mg at 11/29/24 2058    prochlorperazine (COMPAZINE) injection 5 mg  5 mg Intravenous Q6H PRN Telly Rodriguez MD   5 mg at 11/28/24 0413    rosuvastatin (CRESTOR) tablet 10 mg  10 mg Oral Daily Garrett Alicia MD   10 mg at 11/30/24 0901    sennosides-docusate (PERICOLACE) 8.6-50 MG per tablet 2 tablet  2 tablet Oral Nightly Germania Sheridan MD   2 tablet at 11/29/24 2058    sodium chloride 0.9 % flush 10 mL  10 mL Intravenous Q12H Garrett Alicia MD   10 mL at 11/29/24 2058    sodium chloride 0.9 % flush 10 mL  10 mL Intravenous PRN Garrett Alicia MD        sodium chloride 0.9 % infusion 40 mL  40 mL Intravenous PRN Garrett Alicia MD        tamsulosin (FLOMAX) 24 hr capsule 0.4 mg  0.4 mg Oral Daily Garrett Alicia MD   0.4 mg at 11/30/24 0901       Past Medical History:  Past Medical History:   Diagnosis Date    Arthritis     Autonomic disease     CAD (coronary artery disease) 02/06/2017    Cervical radiculopathy 09/16/2021    Chronic constipation with acute exaccerbation 05/10/2021    Coronary artery disease     Degeneration of cervical intervertebral disc  08/11/2021    Diabetes mellitus     Diabetic foot ulcer 08/31/2020    Diabetic polyneuropathy associated with type 2 diabetes mellitus 01/18/2021    Elevated cholesterol     Gastroesophageal reflux disease 05/13/2019    Headache     HTN (hypertension), benign 05/03/2017    Hyperlipidemia     Hypertension     Mixed hyperlipidemia 02/07/2017    Multiple lung nodules 01/26/2020    5mm, 9 mm RLL identified 1/2020, not present 10/2019.    Myocardial infarction     Osteomyelitis 01/22/2020    Osteomyelitis of fifth toe of right foot 10/07/2019    Pancreatitis     Persistent insomnia 01/20/2020    Renal disorder     Sleep apnea 02/06/2017    Sleep apnea with use of continuous positive airway pressure (CPAP)     NON-COMPLIANT    Slow transit constipation 01/16/2019    Spinal stenosis in cervical region 09/16/2021    Vitamin D deficiency 03/02/2021       Past Surgical History:  Past Surgical History:   Procedure Laterality Date    ABDOMINAL SURGERY      AMPUTATION FOOT / TOE Left 10/2021    5th digit     ANTERIOR CERVICAL DISCECTOMY W/ FUSION N/A 08/05/2022    Procedure: CERVICAL DISCECTOMY ANTERIOR WITH FUSION C5-6 with possible plating of C5-7 with neuromonitoring and 1 c-arm;  Surgeon: Karel Soliz MD;  Location:  [facility] OR;  Service: Neurosurgery;  Laterality: N/A;    APPENDECTOMY      BACK SURGERY      CARDIAC CATHETERIZATION Left 02/08/2021    Procedure: Left Heart Cath w poss intervention left anatomical snuff box acess;  Surgeon: Omkar Charles DO;  Location:  [facility] CATH INVASIVE LOCATION;  Service: Cardiology;  Laterality: Left;    CARDIAC SURGERY      CATARACT EXTRACTION      CERVICAL SPINE SURGERY      COLONOSCOPY N/A 01/31/2017    Normal exam repeat in 5 years    COLONOSCOPY N/A 02/11/2019    Mild acute inflammation    COLONOSCOPY N/A 04/07/2024    2 areas at 10 and 20 cm with friability ulceration 2 clips placed at 20 cm and 4 clips at 10 cm poor prep normal mucosa,mild eroisions and ulcerations  in visible vessels    COLONOSCOPY N/A 2024    Procedure: COLONOSCOPY WITH ANESTHESIA;  Surgeon: Arsalan Lorenzo DO;  Location: [facility] ENDOSCOPY;  Service: Gastroenterology;  Laterality: N/A;  pre op constipation/diarrhea  post poor prep  pcp Del Shetty    COLONOSCOPY N/A 2024    Procedure: COLONOSCOPY WITH ANESTHESIA;  Surgeon: Agapito Christopher MD;  Location: [facility] ENDOSCOPY;  Service: Gastroenterology;  Laterality: N/A;  pre rectal bleeding  post poor prep  pcp Del Shetty MD    COLONOSCOPY W/ POLYPECTOMY  2014    Hyperplastic polyp    CORONARY ARTERY BYPASS GRAFT  10/2015    ENDOSCOPY  2011    Gastritis with hemorrhage    ENDOSCOPY N/A 2017    Normal exam    ENDOSCOPY N/A 2019    Gastritis    ENDOSCOPY N/A 2020    Non-erosive gastritis with hemorrhage    ENDOSCOPY N/A 02/10/2021    Esophagitis    ENDOSCOPY N/A 2024    There were esophageal mucosal changes suspicious for short-segment Low's esophagus present in the distal esophagus. The maximum longitudinal extent of these mucosal changes was 2 cm in length. Mucosa was biopsied with a cold forceps for histologyDistal esophagus, biopsies: Mild chronic active esophagogastritis. No evidence of intestinal metaplasia, dysplasia. Antrum, bx, Mild chronic gastritis    FOOT SURGERY Left     INCISION AND DRAINAGE OF WOUND Left 2007    spider bite       Family History  Family History   Problem Relation Age of Onset    Colon cancer Father     Heart disease Father     Colon cancer Sister     Colon polyps Sister     Alzheimer's disease Mother     Coronary artery disease Sister     Coronary artery disease Sister        Social History  Social History     Socioeconomic History    Marital status:    Tobacco Use    Smoking status: Former     Current packs/day: 0.00     Types: Cigarettes     Quit date:      Years since quittin.9    Smokeless tobacco: Never    Tobacco comments:     smoked in [place]    Vaping Use    Vaping status: Never Used   Substance and Sexual Activity    Alcohol use: No    Drug use: No    Sexual activity: Defer       Review of Systems:  History obtained from chart review and the patient  General ROS: No fever or chills  Respiratory ROS: No cough, shortness of breath, wheezing  Cardiovascular ROS: No chest pain or palpitations  Gastrointestinal ROS: No abdominal pain or melena  Genito-Urinary ROS: No dysuria or hematuria  Psych ROS: No anxiety and depression  14 point ROS reviewed with the patient and negative except as noted above and in the HPI unless unable to obtain.    Objective:  Patient Vitals for the past 24 hrs:   BP Temp Temp src Pulse Resp SpO2   11/30/24 1556 132/62 98.1 °F (36.7 °C) -- 107 16 100 %   11/30/24 1124 115/40 97.6 °F (36.4 °C) -- 66 16 98 %   11/30/24 0813 125/65 98.3 °F (36.8 °C) -- 61 16 93 %   11/30/24 0545 113/54 98.2 °F (36.8 °C) Oral 59 16 99 %   11/29/24 2304 126/67 98.1 °F (36.7 °C) Oral 66 16 96 %   11/29/24 2150 123/62 98.1 °F (36.7 °C) Oral 75 16 97 %       Intake/Output Summary (Last 24 hours) at 11/30/2024 1656  Last data filed at 11/30/2024 1429  Gross per 24 hour   Intake 480 ml   Output 1250 ml   Net -770 ml     General: awake/alert   Chest:  clear to auscultation bilaterally without respiratory distress  CVS: regular rate and rhythm  Abdominal: soft, nontender, positive bowel sounds  Extremities: no cyanosis or edema  Skin: warm and dry without rash      Labs:  Results from last 7 days   Lab Units 11/30/24  0606 11/29/24  0600 11/28/24  0424   WBC 10*3/mm3 5.39 4.55 5.32   HEMOGLOBIN g/dL 9.7* 10.1* 10.5*   HEMATOCRIT % 30.5* 31.9* 32.4*   PLATELETS 10*3/mm3 191 183 197         Results from last 7 days   Lab Units 11/30/24  0606 11/29/24  0600 11/28/24  0424   SODIUM mmol/L 139 138 137   POTASSIUM mmol/L 4.6 4.8 4.3   CHLORIDE mmol/L 108* 106 104   CO2 mmol/L 22.0 23.0 20.0*   BUN mg/dL 44* 41* 35*   CREATININE mg/dL 2.33* 2.27* 2.23*   CALCIUM mg/dL  8.2* 8.4* 8.4*   EGFR mL/min/1.73 29.7* 30.7* 31.3*   GLUCOSE mg/dL 144* 131* 125*       Radiology:   Imaging Results (Last 72 Hours)       Procedure Component Value Units Date/Time    XR Forearm 2 View Right [[id number]] Collected: 11/30/24 1405     Updated: 11/30/24 1410    Narrative:      EXAMINATION: XR FOREARM 2 VW RIGHT- 11/30/2024 2:05 PM     HISTORY: Trauma; N39.0-Urinary tract infection, site not specified;  N17.9-Acute kidney failure, unspecified; N18.9-Chronic kidney disease,  unspecified; R73.9-Hyperglycemia, unspecified; Z74.09-Other reduced  mobility.     REPORT: AP and lateral views of the right forearm were obtained.     COMPARISON: There are no correlative imaging studies for comparison.     Osseous alignment is normal, no fracture is identified. The joint spaces  are preserved. There is ventral soft tissue swelling diffusely.       Impression:      No fracture, no acute osseous abnormality.     This report was signed and finalized on 11/30/2024 2:06 PM by Dr. Percy Cesar MD.       XR Shoulder 2+ View Right [[id number]] Collected: 11/30/24 1403     Updated: 11/30/24 1408    Narrative:      EXAMINATION: XR SHOULDER 2+ VW RIGHT- 11/30/2024 2:03 PM     HISTORY: Glf; N39.0-Urinary tract infection, site not specified;  N17.9-Acute kidney failure, unspecified; N18.9-Chronic kidney disease,  unspecified; R73.9-Hyperglycemia, unspecified; Z74.09-Other reduced  mobility.     REPORT: 3 views of the right shoulder were obtained.     COMPARISON: Right shoulder x-rays 10/20/2024. No fracture or dislocation  is identified, there is spurring of the acromion process and AC joint,  narrowing of the acromiohumeral interval. Lateral downsloping of the  acromion process is also present. There is spurring at the inferior  glenoid process. The soft tissues are within normal limits       Impression:      No fracture, no acute osseous abnormality. Advanced  degenerative changes as described.     This report was signed  and finalized on 11/30/2024 2:05 PM by Dr. Percy Cesar MD.               Culture:  Blood Culture   Date Value Ref Range Status   11/18/2024 No growth at 3 days  Preliminary   11/17/2024 No growth at 3 days  Preliminary     Urine Culture   Date Value Ref Range Status   11/17/2024 >100,000 CFU/mL Escherichia coli (A)  Preliminary         Assessment    Acute kidney injury--improving  Baseline chronic kidney disease stage 3b  Hypertension  Uncontrolled type 2 diabetes (A1c 13.4%)  Anemia of CKD  Metabolic acidosis--improved  Acute cystitis  Obesity   S/p dfall    Plan:  Renal function about stable   Monitor labs  Stable for discharge form renal standpoint.       Vinny Hartley MD  11/30/2024  16:56 CST

## 2024-11-30 NOTE — THERAPY TREATMENT NOTE
Acute Care - Physical Therapy Treatment Note  [facility]     Patient Name: Erick Luong  : 1955  MRN: [id number]  Today's Date: 2024      Visit Dx:     ICD-10-CM ICD-9-CM   1. Acute UTI (urinary tract infection)  N39.0 599.0   2. Acute renal failure superimposed on chronic kidney disease, unspecified acute renal failure type, unspecified CKD stage  N17.9 584.9    N18.9 585.9   3. Acute hyperglycemia  R73.9 790.29   4. Impaired functional mobility and activity tolerance [Z74.09]  Z74.09 V49.89     Patient Active Problem List   Diagnosis    Obesity, unspecified obesity severity, unspecified obesity type    Essential hypertension    Type 2 diabetes mellitus with hyperglycemia, with long-term current use of insulin    Nonsmoker    Anemia due to chronic kidney disease    Class 3 severe obesity due to excess calories with body mass index (BMI) of 40.0 to 44.9 in adult    Anasarca    Sleep apnea with use of continuous positive airway pressure (CPAP)    Medically noncompliant    Diabetic ulcer of left foot associated with type 2 diabetes mellitus    Diabetic polyneuropathy associated with type 2 diabetes mellitus    Spinal stenosis in cervical region    Degeneration of cervical intervertebral disc    Cervical radiculopathy    Degeneration of lumbar or lumbosacral intervertebral disc    Cervical myelopathy    Bilateral carpal tunnel syndrome    CAD (coronary artery disease)    GERD without esophagitis    BPH without obstruction/lower urinary tract symptoms    Stage 3b chronic kidney disease    Chronic diastolic heart failure    Type 2 myocardial infarction due to heart failure    Left carpal tunnel syndrome    Syncope and collapse, recurrent episodes    Poorly-controlled hypertension    Rhinovirus    Peripheral vascular disease    Chronic kidney disease (CKD), stage IV (severe)    Diabetic foot infection    Sepsis    Epigastric pain    Chronic heart failure with preserved ejection fraction (HFpEF)    Sepsis  due to methicillin resistant Staphylococcus aureus (MRSA) with encephalopathy without septic shock    Slow transit constipation    CHF exacerbation    CHF (congestive heart failure), NYHA class I, acute on chronic, combined    Rectal bleeding    Acute on chronic heart failure with preserved ejection fraction (HFpEF)    DKA, type 2, not at goal    Atrial fibrillation    E. coli UTI, ESBL    Acute UTI (urinary tract infection)    Acute encephalopathy    Type 2 diabetes mellitus with hyperglycemia, with long-term current use of insulin     Past Medical History:   Diagnosis Date    Arthritis     Autonomic disease     CAD (coronary artery disease) 02/06/2017    Cervical radiculopathy 09/16/2021    Chronic constipation with acute exaccerbation 05/10/2021    Coronary artery disease     Degeneration of cervical intervertebral disc 08/11/2021    Diabetes mellitus     Diabetic foot ulcer 08/31/2020    Diabetic polyneuropathy associated with type 2 diabetes mellitus 01/18/2021    Elevated cholesterol     Gastroesophageal reflux disease 05/13/2019    Headache     HTN (hypertension), benign 05/03/2017    Hyperlipidemia     Hypertension     Mixed hyperlipidemia 02/07/2017    Multiple lung nodules 01/26/2020    5mm, 9 mm RLL identified 1/2020, not present 10/2019.    Myocardial infarction     Osteomyelitis 01/22/2020    Osteomyelitis of fifth toe of right foot 10/07/2019    Pancreatitis     Persistent insomnia 01/20/2020    Renal disorder     Sleep apnea 02/06/2017    Sleep apnea with use of continuous positive airway pressure (CPAP)     NON-COMPLIANT    Slow transit constipation 01/16/2019    Spinal stenosis in cervical region 09/16/2021    Vitamin D deficiency 03/02/2021     Past Surgical History:   Procedure Laterality Date    ABDOMINAL SURGERY      AMPUTATION FOOT / TOE Left 10/2021    5th digit     ANTERIOR CERVICAL DISCECTOMY W/ FUSION N/A 08/05/2022    Procedure: CERVICAL DISCECTOMY ANTERIOR WITH FUSION C5-6 with possible  plating of C5-7 with neuromonitoring and 1 c-arm;  Surgeon: Karel Soliz MD;  Location: [facility] OR;  Service: Neurosurgery;  Laterality: N/A;    APPENDECTOMY      BACK SURGERY      CARDIAC CATHETERIZATION Left 02/08/2021    Procedure: Left Heart Cath w poss intervention left anatomical snuff box acess;  Surgeon: Omkar Charles DO;  Location: [facility] CATH INVASIVE LOCATION;  Service: Cardiology;  Laterality: Left;    CARDIAC SURGERY      CATARACT EXTRACTION      CERVICAL SPINE SURGERY      COLONOSCOPY N/A 01/31/2017    Normal exam repeat in 5 years    COLONOSCOPY N/A 02/11/2019    Mild acute inflammation    COLONOSCOPY N/A 04/07/2024    2 areas at 10 and 20 cm with friability ulceration 2 clips placed at 20 cm and 4 clips at 10 cm poor prep normal mucosa,mild eroisions and ulcerations in visible vessels    COLONOSCOPY N/A 7/1/2024    Procedure: COLONOSCOPY WITH ANESTHESIA;  Surgeon: Arsalan Lorenzo DO;  Location: [facility] ENDOSCOPY;  Service: Gastroenterology;  Laterality: N/A;  pre op constipation/diarrhea  post poor prep  pcp Del Shetty    COLONOSCOPY N/A 7/2/2024    Procedure: COLONOSCOPY WITH ANESTHESIA;  Surgeon: Agapito Christopher MD;  Location: [facility] ENDOSCOPY;  Service: Gastroenterology;  Laterality: N/A;  pre rectal bleeding  post poor prep  pcp Del Shetty MD    COLONOSCOPY W/ POLYPECTOMY  03/04/2014    Hyperplastic polyp    CORONARY ARTERY BYPASS GRAFT  10/2015    ENDOSCOPY  04/13/2011    Gastritis with hemorrhage    ENDOSCOPY N/A 05/05/2017    Normal exam    ENDOSCOPY N/A 02/11/2019    Gastritis    ENDOSCOPY N/A 09/01/2020    Non-erosive gastritis with hemorrhage    ENDOSCOPY N/A 02/10/2021    Esophagitis    ENDOSCOPY N/A 04/11/2024    There were esophageal mucosal changes suspicious for short-segment Low's esophagus present in the distal esophagus. The maximum longitudinal extent of these mucosal changes was 2 cm in length. Mucosa was biopsied with a cold forceps for  histologyDistal esophagus, biopsies: Mild chronic active esophagogastritis. No evidence of intestinal metaplasia, dysplasia. Antrum, bx, Mild chronic gastritis    FOOT SURGERY Left     INCISION AND DRAINAGE OF WOUND Left 09/2007    spider bite     PT Assessment (Last 12 Hours)       PT Evaluation and Treatment       Row Name 11/30/24 0912          Physical Therapy Time and Intention    Subjective Information complains of;pain;weakness  -AE     Document Type therapy note (daily note)  -AE     Mode of Treatment physical therapy  -AE       Row Name 11/30/24 0912          General Information    Existing Precautions/Restrictions fall  -AE       Row Name 11/30/24 0912          Pain    Pretreatment Pain Rating 2/10  -AE     Posttreatment Pain Rating 4/10  -AE     Pain Location neck;flank  -AE     Pain Side/Orientation right  -AE     Response to Pain Interventions activity participation with increased pain  -AE       Row Name 11/30/24 0912          Bed Mobility    Supine-Sit [place] (Bed Mobility) modified independence  -AE       Row Name 11/30/24 0912          Sit-Stand Transfer    Sit-Stand [place] (Transfers) minimum assist (75% patient effort);contact guard;verbal cues  -AE       Row Name 11/30/24 0912          Stand-Sit Transfer    Stand-Sit [place] (Transfers) contact guard;verbal cues  -AE       Row Name 11/30/24 0912          Gait/Stairs (Locomotion)    [place] Level (Gait) contact guard;minimum assist (75% patient effort)  -AE     Assistive Device (Gait) walker, front-wheeled  -AE     Distance in Feet (Gait) 50  -AE     Deviations/Abnormal Patterns (Gait) gait speed decreased  -AE     Bilateral Gait Deviations forward flexed posture  -AE     Comment, (Gait/Stairs) CAM boot donned before gait  -AE       Row Name 11/30/24 0912          Hip (Therapeutic Exercise)    Hip (Therapeutic Exercise) AROM (active range of motion)  -AE     Hip AROM (Therapeutic Exercise) bilateral;15 repititions  -AE        Row Name 11/30/24 0912          Knee (Therapeutic Exercise)    Knee (Therapeutic Exercise) AROM (active range of motion)  -AE     Knee AROM (Therapeutic Exercise) LAQ (long arc quad);20 repititions  -AE       Row Name 11/30/24 0912          Ankle (Therapeutic Exercise)    Ankle (Therapeutic Exercise) AROM (active range of motion)  -AE     Ankle AROM (Therapeutic Exercise) dorsiflexion;plantarflexion;10 repetitions  -AE       Row Name             Wound 08/22/23 0140 Left posterior plantar    Wound - Properties Group Placement Date: 08/22/23  -AM Placement Time: 0140  -AM Side: Left  -AM Orientation: posterior  -AM Location: plantar  -AM Present on Original Admission: Y  -AM    Retired Wound - Properties Group Placement Date: 08/22/23  -AM Placement Time: 0140  -AM Present on Original Admission: Y  -AM Side: Left  -AM Orientation: posterior  -AM Location: plantar  -AM    Retired Wound - Properties Group Placement Date: 08/22/23  -AM Placement Time: 0140  -AM Present on Original Admission: Y  -AM Side: Left  -AM Orientation: posterior  -AM Location: plantar  -AM    Retired Wound - Properties Group Date first assessed: 08/22/23  -AM Time first assessed: 0140  -AM Present on Original Admission: Y  -AM Side: Left  -AM Location: plantar  -AM      Row Name             Wound 06/15/23 0102 Left anterior plantar    Wound - Properties Group Placement Date: 06/15/23  -SM Placement Time: 0102  -SM Side: Left  -SM Orientation: anterior  -SM Location: plantar  -SM Removal Date: --  -JR Removal Time: --  -JR    Retired Wound - Properties Group Placement Date: 06/15/23  -SM Placement Time: 0102  -SM Side: Left  -SM Orientation: anterior  -SM Location: plantar  -SM Removal Date: --  -JR Removal Time: --  -JR    Retired Wound - Properties Group Placement Date: 06/15/23  -SM Placement Time: 0102  -SM Side: Left  -SM Orientation: anterior  -SM Location: plantar  -SM Removal Date: --  -JR Removal Time: --  -JR    Retired Wound -  Properties Group Date first assessed: 06/15/23  -SM Time first assessed: 0102  -SM Side: Left  -SM Location: plantar  -SM Resolution Date: --  -JR Resolution Time: --  -JR      Row Name             Wound 11/18/24 0046 Left lower arm skin tear;abrasion    Wound - Properties Group Placement Date: 11/18/24  -JR Placement Time: 0046  -JR Side: Left  -JR Orientation: lower  -JR Location: arm  -JR Primary Wound Type: Abrasion  -JR Type: skin tear;abrasion  -JR Present on Original Admission: Y  -JR    Retired Wound - Properties Group Placement Date: 11/18/24  -JR Placement Time: 0046  -JR Present on Original Admission: Y  -JR Side: Left  -JR Orientation: lower  -JR Location: arm  -JR Primary Wound Type: Abrasion  -JR Type: skin tear;abrasion  -JR    Retired Wound - Properties Group Placement Date: 11/18/24  -JR Placement Time: 0046  -JR Present on Original Admission: Y  -JR Side: Left  -JR Orientation: lower  -JR Location: arm  -JR Primary Wound Type: Abrasion  -JR Type: skin tear;abrasion  -JR    Retired Wound - Properties Group Date first assessed: 11/18/24  -JR Time first assessed: 0046  -JR Present on Original Admission: Y  -JR Side: Left  -JR Location: arm  -JR Primary Wound Type: Abrasion  -JR Type: skin tear;abrasion  -JR      Row Name             Wound 11/18/24 0056 Left medial foot diabetic ulcer    Wound - Properties Group Placement Date: 11/18/24  -JR Placement Time: 0056  -JR Side: Left  -JR Orientation: medial  -JR Location: foot  -JR Primary Wound Type: Diabetic ulc  -JR Type: diabetic ulcer  -JR Present on Original Admission: Y  -JR    Retired Wound - Properties Group Placement Date: 11/18/24  -JR Placement Time: 0056  -JR Present on Original Admission: Y  -JR Side: Left  -JR Orientation: medial  -JR Location: foot  -JR Primary Wound Type: Diabetic ulc  -JR Type: diabetic ulcer  -JR    Retired Wound - Properties Group Placement Date: 11/18/24  -JR Placement Time: 0056  -JR Present on Original Admission: Y   -JR Side: Left  -JR Orientation: medial  -JR Location: foot  -JR Primary Wound Type: Diabetic ulc  -JR Type: diabetic ulcer  -JR    Retired Wound - Properties Group Date first assessed: 11/18/24  -JR Time first assessed: 0056  -JR Present on Original Admission: Y  -JR Side: Left  -JR Location: foot  -JR Primary Wound Type: Diabetic ulc  -JR Type: diabetic ulcer  -JR      Row Name             Wound 11/29/24 1400 Right anterior arm    Wound - Properties Group Placement Date: 11/29/24  -AW Placement Time: 1400  -AW Side: Right  -AW Orientation: anterior  -AW Location: arm  -AW Primary Wound Type: Skin tear  -AW    Retired Wound - Properties Group Placement Date: 11/29/24  -AW Placement Time: 1400  -AW Side: Right  -AW Orientation: anterior  -AW Location: arm  -AW Primary Wound Type: Skin tear  -AW    Retired Wound - Properties Group Placement Date: 11/29/24  -AW Placement Time: 1400  -AW Side: Right  -AW Orientation: anterior  -AW Location: arm  -AW Primary Wound Type: Skin tear  -AW    Retired Wound - Properties Group Date first assessed: 11/29/24  -AW Time first assessed: 1400  -AW Side: Right  -AW Location: arm  -AW Primary Wound Type: Skin tear  -AW      Row Name             Wound 11/29/24 1400 Right proximal arm    Wound - Properties Group Placement Date: 11/29/24  -AW Placement Time: 1400  -AW Side: Right  -AW Orientation: proximal  -AW Location: arm  -AW Primary Wound Type: Skin tear  -AW    Retired Wound - Properties Group Placement Date: 11/29/24  -AW Placement Time: 1400  -AW Side: Right  -AW Orientation: proximal  -AW Location: arm  -AW Primary Wound Type: Skin tear  -AW    Retired Wound - Properties Group Placement Date: 11/29/24  -AW Placement Time: 1400  -AW Side: Right  -AW Orientation: proximal  -AW Location: arm  -AW Primary Wound Type: Skin tear  -AW    Retired Wound - Properties Group Date first assessed: 11/29/24  -AW Time first assessed: 1400  -AW Side: Right  -AW Location: arm  -AW Primary Wound  Type: Skin tear  -AW      Row Name             Wound 11/29/24 1400 Left posterior elbow    Wound - Properties Group Placement Date: 11/29/24  -AW Placement Time: 1400  -AW Side: Left  -AW Orientation: posterior  -AW Location: elbow  -AW Primary Wound Type: Skin tear  -AW    Retired Wound - Properties Group Placement Date: 11/29/24  -AW Placement Time: 1400  -AW Side: Left  -AW Orientation: posterior  -AW Location: elbow  -AW Primary Wound Type: Skin tear  -AW    Retired Wound - Properties Group Placement Date: 11/29/24  -AW Placement Time: 1400  -AW Side: Left  -AW Orientation: posterior  -AW Location: elbow  -AW Primary Wound Type: Skin tear  -AW    Retired Wound - Properties Group Date first assessed: 11/29/24  -AW Time first assessed: 1400  -AW Side: Left  -AW Location: elbow  -AW Primary Wound Type: Skin tear  -AW      Row Name 11/30/24 0912          Positioning and Restraints    Pre-Treatment Position in bed  -AE     Post Treatment Position chair  -AE     In Chair sitting;call light within reach;encouraged to call for assist;exit alarm on  -AE               User Key  (r) = Recorded By, (t) = Taken By, (c) = Cosigned By      Initials Name Provider Type    AE Hansa Arambula, PARUL Physical Therapist Assistant    Faviola Kunz RN Registered Nurse    Michelle Diaz RN Registered Nurse    Sabas Young LPNA Licensed Nurse    Sabas Young, RN Registered Nurse    Bree Posada, RN Registered Nurse                    Physical Therapy Education       Title: PT OT SLP Therapies (In Progress)       Topic: Physical Therapy (Done)       Point: Mobility training (Done)       Learning Progress Summary            Patient Eager, E, VU by AE at 11/30/2024 0943    Comment: ex's    Acceptance, E, VU by AJ at 11/27/2024 1343    Comment: continued role of PT, benefits of OOB activity, goal update and progress    Acceptance, E, NR by AE at 11/21/2024 0850    Comment: NWB L LE and need for CAM boot     Acceptance, E, VU by JACIEL at 11/19/2024 0958    Comment: limited gait until boot for RLE    Acceptance, E,TB,D, VU,NR by NIKO at 11/18/2024 1128    Comment: education re: purpose of PT/importance of activity, safety/falls prevention, NWB L LE due to open wounds, improved tech w/ tfers, positioning w/ L LE elevated                      Point: Home exercise program (Done)       Learning Progress Summary            Patient Eager, E, VU by AE at 11/30/2024 0943    Comment: ex's    Acceptance, E, VU by AJ at 11/27/2024 1343    Comment: continued role of PT, benefits of OOB activity, goal update and progress    Acceptance, E, NR by AE at 11/21/2024 0850    Comment: NWB L LE and need for CAM boot                      Point: Precautions (Done)       Learning Progress Summary            Patient Acceptance, E, VU by YANG at 11/27/2024 1343    Comment: continued role of PT, benefits of OOB activity, goal update and progress    Acceptance, E,TB,D, VU,NR by NIKO at 11/18/2024 1128    Comment: education re: purpose of PT/importance of activity, safety/falls prevention, NWB L LE due to open wounds, improved tech w/ tfers, positioning w/ L LE elevated                                      User Key       Initials Effective Dates Name Provider Type Discipline    AE 02/03/23 -  Hansa Arambula, PTA Physical Therapist Assistant PT    JACIEL 02/03/23 -  Sabas Maharaj PTA Physical Therapist Assistant PT    NIKO 08/02/18 -  Melly Mustafa, PT Physical Therapist PT    YANG 08/15/24 -  Ronal Barry, PT DPT Physical Therapist PT                  PT Recommendation and Plan     Progress: improving  Outcome Evaluation: Pt was Independent for supine to sit and c/o R flank pain and neck pain 4/10 post PT.PTA donned CAM boot on L LE.He was CGA to stand and CGA-min x 1 to walk with RW 50ft.Pt c/o of feeling shaky and slightly weaker today.Pt was unsure but thought it might be due to nervousness.Pt would benefit from HH PT.       Time Calculation:    PT  Charges       Row Name 11/30/24 0944             Time Calculation    Start Time 0912  -AE      Stop Time 0938  -AE      Time Calculation (min) 26 min  -AE      PT Received On 11/30/24  -AE      PT Goal Re-Cert Due Date 12/07/24  -AE         Time Calculation- PT    Total Timed Code Minutes- PT 26 minute(s)  -AE         Timed Charges    52869 - PT Therapeutic Exercise Minutes 10  -AE      34266 - Gait Training Minutes  16  -AE         Total Minutes    Timed Charges Total Minutes 26  -AE       Total Minutes 26  -AE                User Key  (r) = Recorded By, (t) = Taken By, (c) = Cosigned By      Initials Name Provider Type    AE Hansa Arambula PTA Physical Therapist Assistant                  Therapy Charges for Today       Code Description Service Date Service Provider Modifiers Qty    62384468998 HC PT THERAPEUTIC ACT EA 15 MIN 11/29/2024 Hansa Arambula, PARUL GP 1    57472745743 HC GAIT TRAINING EA 15 MIN 11/29/2024 Hansa Arambula, PTA GP 1    17824100345 HC GAIT TRAINING EA 15 MIN 11/30/2024 Hansa Arambula, PARUL GP 1    77816763540 HC PT THER PROC EA 15 MIN 11/30/2024 Hansa Arambula, PARUL GP 1            PT G-Codes  Outcome Measure Options: AM-PAC 6 Clicks Basic Mobility (PT)  AM-PAC 6 Clicks Score (PT): 17  AM-PAC 6 Clicks Score (OT): 20    Hansa Arambula PTA  11/30/2024

## 2024-12-01 ENCOUNTER — READMISSION MANAGEMENT (OUTPATIENT)
Dept: CALL CENTER | Facility: HOSPITAL | Age: 69
End: 2024-12-01
Payer: MEDICARE

## 2024-12-01 VITALS
SYSTOLIC BLOOD PRESSURE: 114 MMHG | RESPIRATION RATE: 16 BRPM | HEART RATE: 64 BPM | BODY MASS INDEX: 35.11 KG/M2 | OXYGEN SATURATION: 97 % | HEIGHT: 72 IN | DIASTOLIC BLOOD PRESSURE: 65 MMHG | WEIGHT: 259.2 LBS | TEMPERATURE: 97.9 F

## 2024-12-01 PROBLEM — J18.9 PNEUMONIA, UNSPECIFIED ORGANISM: Status: ACTIVE | Noted: 2024-12-01

## 2024-12-01 PROBLEM — G93.40 ACUTE ENCEPHALOPATHY: Status: RESOLVED | Noted: 2024-11-18 | Resolved: 2024-12-01

## 2024-12-01 PROBLEM — J18.9 PNEUMONIA OF LEFT LOWER LOBE DUE TO INFECTIOUS ORGANISM: Status: ACTIVE | Noted: 2024-12-01

## 2024-12-01 PROBLEM — N39.0 ACUTE UTI (URINARY TRACT INFECTION): Status: RESOLVED | Noted: 2024-11-17 | Resolved: 2024-12-01

## 2024-12-01 LAB
ANION GAP SERPL CALCULATED.3IONS-SCNC: 9 MMOL/L (ref 5–15)
BUN SERPL-MCNC: 43 MG/DL (ref 8–23)
BUN/CREAT SERPL: 18.3 (ref 7–25)
CALCIUM SPEC-SCNC: 8.4 MG/DL (ref 8.6–10.5)
CHLORIDE SERPL-SCNC: 105 MMOL/L (ref 98–107)
CO2 SERPL-SCNC: 22 MMOL/L (ref 22–29)
CREAT SERPL-MCNC: 2.35 MG/DL (ref 0.76–1.27)
DEPRECATED RDW RBC AUTO: 49.4 FL (ref 37–54)
EGFRCR SERPLBLD CKD-EPI 2021: 29.4 ML/MIN/1.73
ERYTHROCYTE [DISTWIDTH] IN BLOOD BY AUTOMATED COUNT: 15.4 % (ref 12.3–15.4)
GLUCOSE BLDC GLUCOMTR-MCNC: 150 MG/DL (ref 70–130)
GLUCOSE SERPL-MCNC: 143 MG/DL (ref 65–99)
HCT VFR BLD AUTO: 30.7 % (ref 37.5–51)
HGB BLD-MCNC: 9.8 G/DL (ref 13–17.7)
MCH RBC QN AUTO: 28 PG (ref 26.6–33)
MCHC RBC AUTO-ENTMCNC: 31.9 G/DL (ref 31.5–35.7)
MCV RBC AUTO: 87.7 FL (ref 79–97)
PLATELET # BLD AUTO: 202 10*3/MM3 (ref 140–450)
PMV BLD AUTO: 11.6 FL (ref 6–12)
POTASSIUM SERPL-SCNC: 4.2 MMOL/L (ref 3.5–5.2)
RBC # BLD AUTO: 3.5 10*6/MM3 (ref 4.14–5.8)
SODIUM SERPL-SCNC: 136 MMOL/L (ref 136–145)
WBC NRBC COR # BLD AUTO: 5.61 10*3/MM3 (ref 3.4–10.8)

## 2024-12-01 PROCEDURE — 82948 REAGENT STRIP/BLOOD GLUCOSE: CPT

## 2024-12-01 PROCEDURE — 80048 BASIC METABOLIC PNL TOTAL CA: CPT | Performed by: FAMILY MEDICINE

## 2024-12-01 PROCEDURE — 85027 COMPLETE CBC AUTOMATED: CPT | Performed by: FAMILY MEDICINE

## 2024-12-01 RX ORDER — BUSPIRONE HYDROCHLORIDE 10 MG/1
10 TABLET ORAL 3 TIMES DAILY PRN
Start: 2024-12-01

## 2024-12-01 RX ORDER — FAMOTIDINE 20 MG/1
20 TABLET, FILM COATED ORAL DAILY
Status: DISCONTINUED | OUTPATIENT
Start: 2024-12-02 | End: 2024-12-01 | Stop reason: HOSPADM

## 2024-12-01 RX ORDER — DOXYCYCLINE 100 MG/1
100 TABLET ORAL EVERY 12 HOURS SCHEDULED
Qty: 7 TABLET | Refills: 0 | Status: SHIPPED | OUTPATIENT
Start: 2024-12-01 | End: 2024-12-05

## 2024-12-01 RX ORDER — LEVOFLOXACIN 750 MG/1
750 TABLET, FILM COATED ORAL EVERY OTHER DAY
Qty: 2 TABLET | Refills: 0 | Status: SHIPPED | OUTPATIENT
Start: 2024-12-03 | End: 2024-12-10 | Stop reason: HOSPADM

## 2024-12-01 RX ADMIN — POLYETHYLENE GLYCOL 3350 17 G: 17 POWDER, FOR SOLUTION ORAL at 08:49

## 2024-12-01 RX ADMIN — LEVOFLOXACIN 750 MG: 500 TABLET, FILM COATED ORAL at 08:49

## 2024-12-01 RX ADMIN — ROSUVASTATIN 10 MG: 10 TABLET, FILM COATED ORAL at 08:48

## 2024-12-01 RX ADMIN — FAMOTIDINE 20 MG: 10 INJECTION INTRAVENOUS at 08:49

## 2024-12-01 RX ADMIN — DOCUSATE SODIUM 100 MG: 100 CAPSULE, LIQUID FILLED ORAL at 08:49

## 2024-12-01 RX ADMIN — OXYCODONE HYDROCHLORIDE AND ACETAMINOPHEN 1 TABLET: 5; 325 TABLET ORAL at 00:51

## 2024-12-01 RX ADMIN — TAMSULOSIN HYDROCHLORIDE 0.4 MG: 0.4 CAPSULE ORAL at 08:49

## 2024-12-01 RX ADMIN — OXYCODONE HYDROCHLORIDE AND ACETAMINOPHEN 1 TABLET: 5; 325 TABLET ORAL at 08:48

## 2024-12-01 RX ADMIN — DOXYCYCLINE 100 MG: 100 TABLET ORAL at 08:49

## 2024-12-01 RX ADMIN — APIXABAN 5 MG: 5 TABLET, FILM COATED ORAL at 08:49

## 2024-12-01 RX ADMIN — Medication 10 ML: at 08:50

## 2024-12-01 RX ADMIN — GUAIFENESIN 1200 MG: 600 TABLET, EXTENDED RELEASE ORAL at 08:48

## 2024-12-01 RX ADMIN — DULOXETINE HYDROCHLORIDE 30 MG: 30 CAPSULE, DELAYED RELEASE ORAL at 08:49

## 2024-12-01 RX ADMIN — LISINOPRIL 5 MG: 10 TABLET ORAL at 08:49

## 2024-12-01 NOTE — DISCHARGE SUMMARY
[facility]  DISCHARGE SUMMARY       Date of Admission: 11/17/2024  Date of Discharge:  12/1/2024  Primary Care Physician: Del Shetty MD    Presenting Problem/History of Present Illness:  Erick Luong is a 69 y.o. male with past medical history of coronary artery disease, CABG, Afib, diabetes mellitus insulin-dependent, diabetic foot ulcer, hypertension, sleep apnea noncompliant with CPAP, right seventh rib fracture on 10/20/2024, presents emergency room with complaints of mental status changes disorientation suprapubic pain and burning with urination. Admitted for UTI.     Final Discharge Diagnoses:  Active Hospital Problems    Diagnosis     Pneumonia of left lower lobe due to infectious organism     Pneumonia, unspecified organism     Type 2 diabetes mellitus with hyperglycemia, with long-term current use of insulin     Atrial fibrillation     Chronic heart failure with preserved ejection fraction (HFpEF)     Stage 3b chronic kidney disease     CAD (coronary artery disease)     BPH without obstruction/lower urinary tract symptoms     Diabetic ulcer of left foot associated with type 2 diabetes mellitus     Sleep apnea with use of continuous positive airway pressure (CPAP)     Essential hypertension        Consults: nephrology    Procedures Performed: none     Pertinent Test Results:   Results for orders placed during the hospital encounter of 03/26/24    Adult Transthoracic Echo Complete W/ Cont if Necessary Per Protocol    Interpretation Summary    The study is technically difficult for diagnosis.  If there is concern for possible infective endocarditis, recommend transesophageal echocardiogram to better visualize the valves.    Left ventricular systolic function is normal. Left ventricular ejection fraction appears to be 61 - 65%.    Left ventricular wall thickness is consistent with mild concentric hypertrophy    Left ventricular diastolic function  is consistent with (grade III w/high LAP) fixed restrictive pattern.    Mildly reduced right ventricular systolic function noted.    The left atrial cavity is mildly dilated.    There is mild calcification of the aortic valve. No aortic valve regurgitation is present. No hemodynamically significant aortic valve stenosis is present.      Imaging Results (All)       Procedure Component Value Units Date/Time    XR Forearm 2 View Right [[id number]] Collected: 11/30/24 1405     Updated: 11/30/24 1410    Narrative:      EXAMINATION: XR FOREARM 2 VW RIGHT- 11/30/2024 2:05 PM     HISTORY: Trauma; N39.0-Urinary tract infection, site not specified;  N17.9-Acute kidney failure, unspecified; N18.9-Chronic kidney disease,  unspecified; R73.9-Hyperglycemia, unspecified; Z74.09-Other reduced  mobility.     REPORT: AP and lateral views of the right forearm were obtained.     COMPARISON: There are no correlative imaging studies for comparison.     Osseous alignment is normal, no fracture is identified. The joint spaces  are preserved. There is ventral soft tissue swelling diffusely.       Impression:      No fracture, no acute osseous abnormality.     This report was signed and finalized on 11/30/2024 2:06 PM by Dr. Percy Cesar MD.       XR Shoulder 2+ View Right [[id number]] Collected: 11/30/24 1403     Updated: 11/30/24 1408    Narrative:      EXAMINATION: XR SHOULDER 2+ VW RIGHT- 11/30/2024 2:03 PM     HISTORY: Glf; N39.0-Urinary tract infection, site not specified;  N17.9-Acute kidney failure, unspecified; N18.9-Chronic kidney disease,  unspecified; R73.9-Hyperglycemia, unspecified; Z74.09-Other reduced  mobility.     REPORT: 3 views of the right shoulder were obtained.     COMPARISON: Right shoulder x-rays 10/20/2024. No fracture or dislocation  is identified, there is spurring of the acromion process and AC joint,  narrowing of the acromiohumeral interval. Lateral downsloping of the  acromion process is also present. There  is spurring at the inferior  glenoid process. The soft tissues are within normal limits       Impression:      No fracture, no acute osseous abnormality. Advanced  degenerative changes as described.     This report was signed and finalized on 11/30/2024 2:05 PM by Dr. Percy Cesar MD.       XR Abdomen KUB [[id number]] Collected: 11/27/24 1632     Updated: 11/27/24 1642    Narrative:      EXAMINATION: XR ABDOMEN KUB- 11/27/2024 4:07 PM     HISTORY: abdominal pain, constipation, nausea; N39.0-Urinary tract  infection, site not specified; N17.9-Acute kidney failure, unspecified;  N18.9-Chronic kidney disease, unspecified; R73.9-Hyperglycemia,  unspecified; Z74.09-Other reduced mobility.     REPORT: Supine imaging of the abdomen was performed.     COMPARISON: KUB 4/24/2024.     Moderate to large volume of stool seen throughout the colon, as well as  a moderate volume of gas and there is mild gaseous distention of a  central colonic loop, probably the sigmoid colon. This has a diameter of  10 cm. Cholecystectomy clips are present. No evidence of free air on  this limited supine view. No acute osseous abnormality.       Impression:      Moderate constipation and probable mild colonic ileus.     This report was signed and finalized on 11/27/2024 4:39 PM by Dr. Percy Cesar MD.       XR Chest 2 View [[id number]] Collected: 11/27/24 1606     Updated: 11/27/24 1609    Narrative:      EXAMINATION: XR CHEST 2 VW-  11/27/2024 4:06 PM     HISTORY: Wheezing and cough.     FINDINGS: 2 view exam of the chest demonstrates left lower lobe  pneumonia. The lungs are otherwise clear. No effusion or free air  present. The patient has undergone prior median sternotomy.       Impression:      1. Left lower lobe pneumonia.     This report was signed and finalized on 11/27/2024 4:06 PM by Dr. Phillip Hart MD.       US Renal Bilateral [[id number]] Collected: 11/19/24 0838     Updated: 11/19/24 0843    Narrative:      US RENAL  BILATERAL-     HISTORY: Acute on chronic renal failure; N39.0-Urinary tract infection,  site not specified; N17.9-Acute kidney failure, unspecified;  N18.9-Chronic kidney disease, unspecified; R73.9-Hyperglycemia,  unspecified; Z74.09-Other reduced mobility     COMPARISON: 8/1/2024     FINDINGS: Sonographic imaging in the kidneys and bladder performed.     The visible abdominal aorta normal in caliber, diminished visualization  of portions of the aorta due to overlying shadowing bowel gas.     Bladder is distended and appears normal.     Kidneys are normal in echotexture. The right kidney measures 9.8 x 5.8 x  5.4 cm. There is an exophytic right mid 4.5 cm renal cyst. The left  kidney measures 11.2 x 5.9 x 7.5 cm. No left renal cyst. No  solid-appearing renal mass. No obstructing intrarenal stones. No  hydronephrosis. No abnormal perinephric fluid collection.       Impression:      1. Simple 4.5 cm right mid renal cyst. Otherwise normal sonographic  appearance of the kidneys.  2. Distended normal-appearing bladder.        This report was signed and finalized on 11/19/2024 8:40 AM by Dr. Yuliana Calhoun MD.       XR Chest 1 View [[id number]] Collected: 11/17/24 2138     Updated: 11/17/24 2143    Narrative:      XR CHEST 1 VW- 11/17/2024 8:33 PM     HISTORY: cough       COMPARISON: 10/20/2024     FINDINGS:  Upright frontal radiograph of the chest was obtained     No lung consolidation. No pleural effusion or pneumothorax. Prior  coronary bypass. Heart size is stable. Pulmonary vasculature are  nondilated. The osseous structures and surrounding soft tissues  demonstrate no acute abnormality.       Impression:      1.  Stable chest exam without acute process. No visualized acute  infiltrate.  2.  Previous coronary bypass.     This report was signed and finalized on 11/17/2024 9:39 PM by Dr King Ceballos.             LAB RESULTS:      Lab 12/01/24  0454 11/30/24  0606 11/29/24  0600 11/28/24  0424 11/27/24  0301   WBC  5.61 5.39 4.55 5.32 7.23   HEMOGLOBIN 9.8* 9.7* 10.1* 10.5* 11.1*   HEMATOCRIT 30.7* 30.5* 31.9* 32.4* 34.3*   PLATELETS 202 191 183 197 260   MCV 87.7 88.4 88.1 87.8 88.2   PROCALCITONIN  --   --   --   --  0.08         Lab 12/01/24  0454 11/30/24  0606 11/29/24  0600 11/28/24  0424 11/27/24  0301   SODIUM 136 139 138 137 140   POTASSIUM 4.2 4.6 4.8 4.3 4.7   CHLORIDE 105 108* 106 104 108*   CO2 22.0 22.0 23.0 20.0* 22.0   ANION GAP 9.0 9.0 9.0 13.0 10.0   BUN 43* 44* 41* 35* 39*   CREATININE 2.35* 2.33* 2.27* 2.23* 2.21*   EGFR 29.4* 29.7* 30.7* 31.3* 31.7*   GLUCOSE 143* 144* 131* 125* 195*   CALCIUM 8.4* 8.2* 8.4* 8.4* 8.8             Lab 11/27/24  0301   PROBNP 6,325.0*                 Brief Urine Lab Results  (Last result in the past 365 days)        Color   Clarity   Blood   Leuk Est   Nitrite   Protein   CREAT   Urine HCG        11/23/24 1235 Yellow   Clear   Negative   Negative   Negative   30 mg/dL (1+)                 Microbiology Results (last 10 days)       Procedure Component Value - Date/Time    MRSA Screen, PCR (Inpatient) - Swab, Nares [[id number]]  (Abnormal) Collected: 11/27/24 1844    Lab Status: Final result Specimen: Swab from Nares Updated: 11/27/24 2038     MRSA PCR MRSA Detected    Narrative:      The negative predictive value of this diagnostic test is high and should only be used to consider de-escalating anti-MRSA therapy. A positive result may indicate colonization with MRSA and must be correlated clinically.    Respiratory Panel PCR w/COVID-19(SARS-CoV-2) MICHAEL/ANKUSH/SEAN/PAD/COR/ROSE MARY In-House, NP Swab in UTM/VTM, 2 HR TAT - Swab, Nasopharynx [[id number]]  (Normal) Collected: 11/26/24 1807    Lab Status: Final result Specimen: Swab from Nasopharynx Updated: 11/26/24 1920     ADENOVIRUS, PCR Not Detected     Coronavirus 229E Not Detected     Coronavirus HKU1 Not Detected     Coronavirus NL63 Not Detected     Coronavirus OC43 Not Detected     COVID19 Not Detected     Human Metapneumovirus Not  Detected     Human Rhinovirus/Enterovirus Not Detected     Influenza A PCR Not Detected     Influenza B PCR Not Detected     Parainfluenza Virus 1 Not Detected     Parainfluenza Virus 2 Not Detected     Parainfluenza Virus 3 Not Detected     Parainfluenza Virus 4 Not Detected     RSV, PCR Not Detected     Bordetella pertussis pcr Not Detected     Bordetella parapertussis PCR Not Detected     Chlamydophila pneumoniae PCR Not Detected     Mycoplasma pneumo by PCR Not Detected    Narrative:      In the setting of a positive respiratory panel with a viral infection PLUS a negative procalcitonin without other underlying concern for bacterial infection, consider observing off antibiotics or discontinuation of antibiotics and continue supportive care. If the respiratory panel is positive for atypical bacterial infection (Bordetella pertussis, Chlamydophila pneumoniae, or Mycoplasma pneumoniae), consider antibiotic de-escalation to target atypical bacterial infection.            Hospital Course:   Erick Luong is a 69 y.o. male with past medical history of coronary artery disease, CABG, Afib, diabetes mellitus insulin-dependent, diabetic foot ulcer, hypertension, sleep apnea noncompliant with CPAP, right seventh rib fracture on 10/20/2024, presents emergency room with complaints of mental status changes disorientation suprapubic pain and burning with urination.  Found to have ESBL E. coli UTI and WANDER.  Later developed left lower lobe pneumonia/HAP.    # ESBL E. coli UTI - completed 10 days of Ertapenem    #  Left lower lobe pneumonia  # Hospital-acquired pneumonia  Some wheezing noted on physical exam 11/27  Chest x-ray showing left lower lobe pneumonia  Respiratory PCR panel negative, MRSA screen positive   Allergic to Vancomycin.  Cannot use Linezolid due to concurrent use of Cymbalta and BuSpar which increases risk of serotonin syndrome.  - MRSA coverage with Doxycyline - started 11/28  - PO Levaquin for Pseudomonas  "coverage - renal dosing q48h  - Patient has stable vitals, WBC 4, feeling better - can be on oral regimen  - Discharged with 3 additional days of Doxy and Levaquin.       # WANDER on CKD 3b   Cr 2.83 >>> 3.00 > 2.55 > 2.15 > 2.3 today   - Nephrology following  -- recommend encouraging p.o. intake  - Bumex held  - Patient follows up with Dr. Lomas outpatient, will schedule appointment within 1 week     # Wounds on Bilateral Feet  - Evaluated by wound care   -Will continue to follow-up with wound care outpatient     # GLF   In hospital 11/29  No head trauma, no LOW   Xray right shoulder and right forearm no fracture  Abrasions present  - Referral for home health and PT     # Hypertension - continue lisinopril  # Hyperlipidemia - continue crestor  # Diabetes mellitus - refusing lantus 20, lispro 10/10/10, agrees to some SSI.  Can continue home regimen at discharge.  # Depression - continue Buspar and Cymbalta  # A-fib - continue Eliquis  # Constipation - docusate and pericolace.    # BPH - continue home flomax       Physical Exam on Discharge:  /65 (BP Location: Left arm, Patient Position: Lying)   Pulse 64   Temp 97.9 °F (36.6 °C)   Resp 16   Ht 182.9 cm (72\")   Wt 118 kg (259 lb 3.2 oz)   SpO2 97%   BMI 35.15 kg/m²   Physical Exam  GEN: Awake, alert, interactive, in NAD, obese  HEENT: Atraumatic,  EOMI, Anicteric,   Lungs: Inspiratory wheeze on right side improved  Heart: RRR, +S1/s2, no rub  ABD: soft, nontender, +BS, no guarding/rebound  Extremities: atraumatic, no cyanosis, no edema  Skin: bilateral wounds on feet.  Dry skin abrasion on right shoulder.  Abrasion on right forearm covered in clean dry dressing.  Neuro: AAOx3, no focal deficits  Psych: normal mood & affect    Condition on Discharge: Stable    Discharge Disposition:  Home or Self Care .  Ambulatory referral for HH/PT.    Discharge Medications:     Discharge Medications        New Medications        Instructions Start Date   doxycycline 100 MG " tablet  Commonly known as: ADOXA   100 mg, Oral, Every 12 Hours Scheduled      levoFLOXacin 750 MG tablet  Commonly known as: LEVAQUIN   750 mg, Oral, Every Other Day, Take one tablet on Tuesday and one on Thursday   Start Date: December 3, 2024            Continue These Medications        Instructions Start Date   ascorbic acid 1000 MG tablet  Commonly known as: VITAMIN C   1,000 mg, Oral, Daily      busPIRone 10 MG tablet  Commonly known as: BUSPAR   10 mg, Oral, 3 Times Daily PRN      donepezil 10 MG tablet  Commonly known as: ARICEPT   10 mg, Oral, Every Night at Bedtime      DULoxetine 60 MG capsule  Commonly known as: CYMBALTA   60 mg, Oral, Daily      Eliquis 5 MG tablet tablet  Generic drug: apixaban   5 mg, Oral, 2 Times Daily      famotidine 20 MG tablet  Commonly known as: PEPCID   20 mg, Oral, 2 Times Daily      Insulin Lispro 100 UNIT/ML injection  Commonly known as: humaLOG   2-12 Units, Subcutaneous, 4 Times Daily Before Meals & Nightly, Inject subcutaneously four times a day per sliding scale:  0-150 = 0 units; 151-200 = 2u; 201-250 = 4u; 251-300 = 6u; 301-350 = 8u; 351-400 = 10u; 401+ = 12u      Lantus 100 UNIT/ML injection  Generic drug: insulin glargine   35 Units, Subcutaneous, Every Night at Bedtime      lisinopril 5 MG tablet  Commonly known as: PRINIVIL,ZESTRIL   5 mg, Oral, Daily      melatonin 3 MG tablet   6 mg, Oral, Nightly PRN      nitroglycerin 0.4 MG SL tablet  Commonly known as: NITROSTAT   0.4 mg, Sublingual, Every 5 Minutes PRN, Take no more than 3 doses in 15 minutes.      oxyCODONE 10 MG tablet  Commonly known as: ROXICODONE   10 mg, Oral, 2 Times Daily PRN      pantoprazole 40 MG EC tablet  Commonly known as: PROTONIX   40 mg, Oral, 2 Times Daily      polyethylene glycol 17 GM/SCOOP powder  Commonly known as: MIRALAX   17 g, Oral, Daily PRN      potassium chloride ER 20 MEQ tablet controlled-release ER tablet  Commonly known as: K-TAB   20 mEq, Oral, Daily      pregabalin 100 MG  capsule  Commonly known as: LYRICA   100 mg, Oral, Daily      pregabalin 100 MG capsule  Commonly known as: LYRICA   200 mg, Oral, Nightly      rosuvastatin 10 MG tablet  Commonly known as: CRESTOR   10 mg, Oral, Daily      sennosides-docusate 8.6-50 MG per tablet  Commonly known as: PERICOLACE   1 tablet, Oral, Nightly, Obtain OTC      sodium hypochlorite 0.125 % solution topical solution 0.125%  Commonly known as: DAKIN'S 1/4 STRENGTH   Topical, 2 Times Daily      tamsulosin 0.4 MG capsule 24 hr capsule  Commonly known as: FLOMAX   0.4 mg, Oral, Daily             Stop These Medications      bumetanide 2 MG tablet  Commonly known as: BUMEX     midodrine 2.5 MG tablet  Commonly known as: PROAMATINE              Discharge Diet:   Dietary Orders (From admission, onward)       Start     Ordered    11/19/24 0749  Diet: Diabetic; Consistent Carbohydrate; Fluid Consistency: Thin (IDDSI 0)  Diet Effective Now        References:    Diet Order [facility]   Question Answer Comment   Diets: Diabetic    Diabetic Diet: Consistent Carbohydrate    Fluid Consistency: Thin (IDDSI 0)        11/19/24 0748                      Activity at Discharge: as tolerated    Follow-up Appointments:   No future appointments.    Test Results Pending at Discharge: none     Electronically signed by Germania Sheridan MD, 12/01/24, 11:57 CST.    Time: 35 minutes.

## 2024-12-01 NOTE — CASE MANAGEMENT/SOCIAL WORK
Continued Stay Note  [facility]     Patient Name: Erick Luong  MRN: [id number]  Today's Date: 12/1/2024    Admit Date: 11/17/2024    Plan: Home   Discharge Plan       Row Name 12/01/24 1887       Plan    Plan Home    Final Discharge Disposition Code 01 - home or self-care    Final Note Pt is discharged home. Pt REFUSES HH and does not want this set up.      Row Name 12/01/24 1151       Plan    Final Discharge Disposition Code 01 - home or self-care                   Discharge Codes    No documentation.                 Expected Discharge Date and Time       Expected Discharge Date Expected Discharge Time    Dec 1, 2024               SUZIE [place]

## 2024-12-01 NOTE — PLAN OF CARE
Problem: Adult Inpatient Plan of Care  Goal: Plan of Care Review  Outcome: Progressing  Flowsheets (Taken 12/1/2024 0656)  Progress: improving  Outcome Evaluation: Patient rested well. Complains of pain x2 this far. IV IID. Voiding, up with assistance. VSS. Contact measures maintained. Safety maintained. Bed alam set.  Plan of Care Reviewed With: patient

## 2024-12-01 NOTE — PROGRESS NOTES
Nephrology ([facility]) Progress Note      Patient:  Erick Luong  YOB: 1955  Date of Service: 12/1/2024  MRN: [id number]   Acct: [id number]   Primary Care Physician: Del Shetty MD  Advance Directive:   Code Status and Medical Interventions: CPR (Attempt to Resuscitate); Full Support   Ordered at: 11/18/24 0159     Code Status (Patient has no pulse and is not breathing):    CPR (Attempt to Resuscitate)     Medical Interventions (Patient has pulse or is breathing):    Full Support     Admit Date: 11/17/2024       Hospital Day: 12  Referring Provider: Garrett Alicia,*      Patient personally seen and examined.  Complete chart including Consults, Notes, Operative Reports, Labs, Cardiology, and Radiology studies reviewed as able.        Subjective:  Erick Luong is a 69 y.o. male for whom we were consulted for evaluation and treatment of acute kidney injury. Baseline chronic kidney disease stage 3b, baseline creatinine approximately 1.5-1.8.  Follows in our office. History of uncontrolled diabetes, hypertension, coronary artery disease, diabetic foot ulcer, obesity. Admitted with cystitis, foot wound and failure to thrive. Minimal PO intake recently. Had no specific complaints at time of nephrology initial exam. Hospital course remarkable for administration of IV fluids and reduction of Bumex dose. Bumex was stopped entirely afternoon of 11/21. Has been receiving IV fluids intermittently since then and renal function has been fluctuating.     Today, he is awake and alert. He is being discharged home.     Allergies:  Cefepime, Bactrim [sulfamethoxazole-trimethoprim], Vancomycin, Zolpidem, and Metronidazole    Home Meds:  Medications Prior to Admission   Medication Sig Dispense Refill Last Dose/Taking    apixaban (Eliquis) 5 MG tablet tablet Take 1 tablet by mouth 2 (Two) Times a Day. 60 tablet 0 Taking    ascorbic acid (VITAMIN C) 1000 MG tablet Take 1 tablet by  mouth Daily. 30 tablet 3 Taking    bumetanide (BUMEX) 2 MG tablet Take 1 tablet by mouth 2 (Two) Times a Day. 6 tablet 3 Taking    donepezil (ARICEPT) 10 MG tablet Take 1 tablet by mouth every night at bedtime. 30 tablet 0 Taking    DULoxetine (CYMBALTA) 60 MG capsule Take 1 capsule by mouth Daily. 30 capsule 0 Taking    famotidine (PEPCID) 20 MG tablet Take 1 tablet by mouth 2 (Two) Times a Day. 60 tablet 0 Taking    insulin glargine (Lantus) 100 UNIT/ML injection Inject 35 Units under the skin into the appropriate area as directed every night at bedtime. 10 mL 0 Taking    lisinopril (PRINIVIL,ZESTRIL) 5 MG tablet Take 1 tablet by mouth Daily. 30 tablet 0 Taking    melatonin 3 MG tablet Take 2 tablets by mouth At Night As Needed for Sleep. 30 tablet 0 Taking As Needed    midodrine (PROAMATINE) 2.5 MG tablet Take 1 tablet by mouth 2 (Two) Times a Day. 60 tablet 5 Taking    oxyCODONE (ROXICODONE) 10 MG tablet Take 1 tablet by mouth 2 (Two) Times a Day As Needed. (Patient taking differently: Take 1 tablet by mouth 2 (Two) Times a Day As Needed for Moderate Pain or Severe Pain. *must last 30 days*) 55 tablet 0 Taking Differently    pantoprazole (PROTONIX) 40 MG EC tablet Take 1 tablet by mouth 2 (Two) Times a Day. 60 tablet 0 Taking    polyethylene glycol (MIRALAX) 17 GM/SCOOP powder Take 17 g by mouth Daily As Needed (constipation).   Taking As Needed    potassium chloride ER (K-TAB) 20 MEQ tablet controlled-release ER tablet Take 1 tablet by mouth Daily. 30 tablet 0 Taking    rosuvastatin (CRESTOR) 10 MG tablet Take 1 tablet by mouth Daily. 30 tablet 0 Taking    sennosides-docusate (PERICOLACE) 8.6-50 MG per tablet Take 1 tablet by mouth Every Night. Obtain OTC   Taking    tamsulosin (FLOMAX) 0.4 MG capsule 24 hr capsule Take 1 capsule by mouth Daily. 90 capsule 4 Taking    [DISCONTINUED] busPIRone (BUSPAR) 10 MG tablet Take 1 tablet by mouth 3 (Three) Times a Day As Needed. (Patient taking differently: Take 1 tablet  by mouth 3 (Three) Times a Day As Needed (anxiety).) 270 tablet 4 Taking Differently    Insulin Lispro (humaLOG) 100 UNIT/ML injection Inject 2-12 Units under the skin into the appropriate area as directed 4 (Four) Times a Day Before Meals & at Bedtime. Inject subcutaneously four times a day per sliding scale:  0-150 = 0 units; 151-200 = 2u; 201-250 = 4u; 251-300 = 6u; 301-350 = 8u; 351-400 = 10u; 401+ = 12u       nitroglycerin (NITROSTAT) 0.4 MG SL tablet Place 1 tablet under the tongue Every 5 (Five) Minutes As Needed for Chest Pain. Take no more than 3 doses in 15 minutes.       pregabalin (LYRICA) 100 MG capsule Take 1 capsule by mouth Daily.       pregabalin (LYRICA) 100 MG capsule Take 2 capsules by mouth Every Night.       sodium hypochlorite (DAKIN'S 1/4 STRENGTH) 0.125 % solution topical solution 0.125% Apply  topically to the appropriate area as directed 2 (Two) Times a Day. 473 mL 5        Medicines:  Current Facility-Administered Medications   Medication Dose Route Frequency Provider Last Rate Last Admin    acetaminophen (TYLENOL) tablet 650 mg  650 mg Oral Q4H PRN Garrett Alicia MD        Or    acetaminophen (TYLENOL) 160 MG/5ML oral solution 650 mg  650 mg Oral Q4H PRN Garrett Alicia MD        Or    acetaminophen (TYLENOL) suppository 650 mg  650 mg Rectal Q4H PRN Garrett Alicia MD        apixaban (ELIQUIS) tablet 5 mg  5 mg Oral BID Garrett Alicia MD   5 mg at 12/01/24 0849    [Held by provider] ascorbic acid (VITAMIN C) tablet 500 mg  500 mg Oral BID Telly Rodriguez MD   500 mg at 11/20/24 0837    bisacodyl (DULCOLAX) EC tablet 5 mg  5 mg Oral Daily PRN Garrett Alicia MD   5 mg at 11/22/24 0924    And    bisacodyl (DULCOLAX) suppository 10 mg  10 mg Rectal Daily PRN Garrett Alicia MD   10 mg at 11/27/24 1136    [Held by provider] bumetanide (BUMEX) tablet 1 mg  1 mg Oral BID Telly Rodriguez MD   1 mg at 11/21/24 0914    busPIRone (BUSPAR)  tablet 10 mg  10 mg Oral TID PRN Garrett Alicia MD   10 mg at 11/20/24 2055    Calcium Replacement - Follow Nurse / BPA Driven Protocol   Not Applicable PRN Telly Rodriguez MD        dextrose (D50W) (25 g/50 mL) IV injection 25 g  25 g Intravenous Q15 Min PRN Garrett Alicia MD        dextrose (GLUTOSE) oral gel 15 g  15 g Oral Q15 Min PRN Garrett Alicia MD   15 g at 11/19/24 0040    docusate sodium (COLACE) capsule 100 mg  100 mg Oral BID Telly Rodriguez MD   100 mg at 12/01/24 0849    [Held by provider] donepezil (ARICEPT) tablet 10 mg  10 mg Oral Nightly Garrett Alicia MD   10 mg at 11/18/24 0219    doxycycline (ADOXA) tablet 100 mg  100 mg Oral Q12H Germania Sheridan MD   100 mg at 12/01/24 0849    DULoxetine (CYMBALTA) DR capsule 30 mg  30 mg Oral Daily Telly Rodriguez MD   30 mg at 12/01/24 0849    famotidine (PEPCID) injection 20 mg  20 mg Intravenous Daily Telly Rodriguez MD   20 mg at 12/01/24 0849    glucagon (GLUCAGEN) injection 1 mg  1 mg Intramuscular Q15 Min PRN Garrett Alicia MD        guaiFENesin (MUCINEX) 12 hr tablet 1,200 mg  1,200 mg Oral Q12H Garrett Alicia MD   1,200 mg at 12/01/24 0848    insulin glargine (LANTUS, SEMGLEE) injection 20 Units  20 Units Subcutaneous Nightly Garrett Alicia MD   20 Units at 11/18/24 2143    Insulin Lispro (humaLOG) injection 10 Units  10 Units Subcutaneous TID With Meals Garrett Alicia MD   10 Units at 11/24/24 1705    Insulin Lispro (humaLOG) injection 2-9 Units  2-9 Units Subcutaneous 4x Daily AC & at Bedtime Garrett Alicia MD   2 Units at 11/30/24 2057    levoFLOXacin (LEVAQUIN) tablet 750 mg  750 mg Oral Every Other Day Germania Sheridan MD   750 mg at 12/01/24 0849    lisinopril (PRINIVIL,ZESTRIL) tablet 5 mg  5 mg Oral Daily Garrett Alicia MD   5 mg at 12/01/24 0849    Magnesium Standard Dose Replacement - Follow Nurse / BPA Driven Protocol   Not Applicable  PRN Telly Rodriguez MD        nitroglycerin (NITROSTAT) SL tablet 0.4 mg  0.4 mg Sublingual Q5 Min PRN Garrett Alicia MD        ondansetron (ZOFRAN) injection 4 mg  4 mg Intravenous Q6H PRN Garrett Alicia MD   4 mg at 11/30/24 1526    oxyCODONE-acetaminophen (PERCOCET) 5-325 MG per tablet 1 tablet  1 tablet Oral Q4H PRN Germania Sheridan MD   1 tablet at 12/01/24 0848    Phosphorus Replacement - Follow Nurse / BPA Driven Protocol   Not Applicable PRN Telly Rodriguez MD        polyethylene glycol (MIRALAX) packet 17 g  17 g Oral Daily Germania Sheridan MD   17 g at 12/01/24 0849    Potassium Replacement - Follow Nurse / BPA Driven Protocol   Not Applicable PRTelly Arriaga MD        pregabalin (LYRICA) capsule 75 mg  75 mg Oral Nightly Telly Rodriguez MD   75 mg at 11/30/24 2058    prochlorperazine (COMPAZINE) injection 5 mg  5 mg Intravenous Q6H PRN Telly Rodriguez MD   5 mg at 11/28/24 0413    rosuvastatin (CRESTOR) tablet 10 mg  10 mg Oral Daily Garrett Alicia MD   10 mg at 12/01/24 0848    sennosides-docusate (PERICOLACE) 8.6-50 MG per tablet 2 tablet  2 tablet Oral Nightly Germania Sheridan MD   2 tablet at 11/30/24 2058    sodium chloride 0.9 % flush 10 mL  10 mL Intravenous Q12H Garrett Alicia MD   10 mL at 12/01/24 0850    sodium chloride 0.9 % flush 10 mL  10 mL Intravenous PRN Garrett Alicia MD        sodium chloride 0.9 % infusion 40 mL  40 mL Intravenous PRN Garrett Alicia MD        tamsulosin (FLOMAX) 24 hr capsule 0.4 mg  0.4 mg Oral Daily Garrett Alicia MD   0.4 mg at 12/01/24 0849       Past Medical History:  Past Medical History:   Diagnosis Date    Arthritis     Autonomic disease     CAD (coronary artery disease) 02/06/2017    Cervical radiculopathy 09/16/2021    Chronic constipation with acute exaccerbation 05/10/2021    Coronary artery disease     Degeneration of cervical intervertebral disc 08/11/2021    Diabetes  mellitus     Diabetic foot ulcer 08/31/2020    Diabetic polyneuropathy associated with type 2 diabetes mellitus 01/18/2021    Elevated cholesterol     Gastroesophageal reflux disease 05/13/2019    Headache     HTN (hypertension), benign 05/03/2017    Hyperlipidemia     Hypertension     Mixed hyperlipidemia 02/07/2017    Multiple lung nodules 01/26/2020    5mm, 9 mm RLL identified 1/2020, not present 10/2019.    Myocardial infarction     Osteomyelitis 01/22/2020    Osteomyelitis of fifth toe of right foot 10/07/2019    Pancreatitis     Persistent insomnia 01/20/2020    Renal disorder     Sleep apnea 02/06/2017    Sleep apnea with use of continuous positive airway pressure (CPAP)     NON-COMPLIANT    Slow transit constipation 01/16/2019    Spinal stenosis in cervical region 09/16/2021    Vitamin D deficiency 03/02/2021       Past Surgical History:  Past Surgical History:   Procedure Laterality Date    ABDOMINAL SURGERY      AMPUTATION FOOT / TOE Left 10/2021    5th digit     ANTERIOR CERVICAL DISCECTOMY W/ FUSION N/A 08/05/2022    Procedure: CERVICAL DISCECTOMY ANTERIOR WITH FUSION C5-6 with possible plating of C5-7 with neuromonitoring and 1 c-arm;  Surgeon: Karel Soliz MD;  Location:  [facility] OR;  Service: Neurosurgery;  Laterality: N/A;    APPENDECTOMY      BACK SURGERY      CARDIAC CATHETERIZATION Left 02/08/2021    Procedure: Left Heart Cath w poss intervention left anatomical snuff box acess;  Surgeon: Omkar Charles DO;  Location:  [facility] CATH INVASIVE LOCATION;  Service: Cardiology;  Laterality: Left;    CARDIAC SURGERY      CATARACT EXTRACTION      CERVICAL SPINE SURGERY      COLONOSCOPY N/A 01/31/2017    Normal exam repeat in 5 years    COLONOSCOPY N/A 02/11/2019    Mild acute inflammation    COLONOSCOPY N/A 04/07/2024    2 areas at 10 and 20 cm with friability ulceration 2 clips placed at 20 cm and 4 clips at 10 cm poor prep normal mucosa,mild eroisions and ulcerations in visible vessels     COLONOSCOPY N/A 2024    Procedure: COLONOSCOPY WITH ANESTHESIA;  Surgeon: Arsalan Lorenzo DO;  Location: [facility] ENDOSCOPY;  Service: Gastroenterology;  Laterality: N/A;  pre op constipation/diarrhea  post poor prep  pcp Del Shetty    COLONOSCOPY N/A 2024    Procedure: COLONOSCOPY WITH ANESTHESIA;  Surgeon: Agapito Christopher MD;  Location: [facility] ENDOSCOPY;  Service: Gastroenterology;  Laterality: N/A;  pre rectal bleeding  post poor prep  pcp Del Shetty MD    COLONOSCOPY W/ POLYPECTOMY  2014    Hyperplastic polyp    CORONARY ARTERY BYPASS GRAFT  10/2015    ENDOSCOPY  2011    Gastritis with hemorrhage    ENDOSCOPY N/A 2017    Normal exam    ENDOSCOPY N/A 2019    Gastritis    ENDOSCOPY N/A 2020    Non-erosive gastritis with hemorrhage    ENDOSCOPY N/A 02/10/2021    Esophagitis    ENDOSCOPY N/A 2024    There were esophageal mucosal changes suspicious for short-segment Low's esophagus present in the distal esophagus. The maximum longitudinal extent of these mucosal changes was 2 cm in length. Mucosa was biopsied with a cold forceps for histologyDistal esophagus, biopsies: Mild chronic active esophagogastritis. No evidence of intestinal metaplasia, dysplasia. Antrum, bx, Mild chronic gastritis    FOOT SURGERY Left     INCISION AND DRAINAGE OF WOUND Left 2007    spider bite       Family History  Family History   Problem Relation Age of Onset    Colon cancer Father     Heart disease Father     Colon cancer Sister     Colon polyps Sister     Alzheimer's disease Mother     Coronary artery disease Sister     Coronary artery disease Sister        Social History  Social History     Socioeconomic History    Marital status:    Tobacco Use    Smoking status: Former     Current packs/day: 0.00     Types: Cigarettes     Quit date:      Years since quittin.9    Smokeless tobacco: Never    Tobacco comments:     smoked in [place]   Vaping Use    Vaping  status: Never Used   Substance and Sexual Activity    Alcohol use: No    Drug use: No    Sexual activity: Defer       Review of Systems:  History obtained from chart review and the patient  General ROS: No fever or chills  Respiratory ROS: No cough, shortness of breath, wheezing  Cardiovascular ROS: No chest pain or palpitations  Gastrointestinal ROS: No abdominal pain or melena  Genito-Urinary ROS: No dysuria or hematuria  Psych ROS: No anxiety and depression  14 point ROS reviewed with the patient and negative except as noted above and in the HPI unless unable to obtain.    Objective:  Patient Vitals for the past 24 hrs:   BP Temp Temp src Pulse Resp SpO2   12/01/24 1124 114/65 97.9 °F (36.6 °C) -- 64 16 97 %   12/01/24 0748 122/68 97.8 °F (36.6 °C) -- 56 16 97 %   12/01/24 0543 117/56 97.7 °F (36.5 °C) Oral 65 16 94 %   12/01/24 0044 144/61 97.6 °F (36.4 °C) Oral 67 16 98 %   11/30/24 1950 133/67 97.5 °F (36.4 °C) Oral 80 16 97 %   11/30/24 1556 132/62 98.1 °F (36.7 °C) -- 107 16 100 %       Intake/Output Summary (Last 24 hours) at 12/1/2024 1436  Last data filed at 12/1/2024 0810  Gross per 24 hour   Intake 120 ml   Output 1200 ml   Net -1080 ml     General: awake/alert   Chest:  clear to auscultation bilaterally without respiratory distress  CVS: regular rate and rhythm  Abdominal: soft, nontender, positive bowel sounds  Extremities: no cyanosis or edema  Skin: warm and dry without rash      Labs:  Results from last 7 days   Lab Units 12/01/24  0454 11/30/24  0606 11/29/24  0600   WBC 10*3/mm3 5.61 5.39 4.55   HEMOGLOBIN g/dL 9.8* 9.7* 10.1*   HEMATOCRIT % 30.7* 30.5* 31.9*   PLATELETS 10*3/mm3 202 191 183         Results from last 7 days   Lab Units 12/01/24  0454 11/30/24  0606 11/29/24  0600   SODIUM mmol/L 136 139 138   POTASSIUM mmol/L 4.2 4.6 4.8   CHLORIDE mmol/L 105 108* 106   CO2 mmol/L 22.0 22.0 23.0   BUN mg/dL 43* 44* 41*   CREATININE mg/dL 2.35* 2.33* 2.27*   CALCIUM mg/dL 8.4* 8.2* 8.4*   EGFR  mL/min/1.73 29.4* 29.7* 30.7*   GLUCOSE mg/dL 143* 144* 131*       Radiology:   Imaging Results (Last 72 Hours)       Procedure Component Value Units Date/Time    XR Forearm 2 View Right [[id number]] Collected: 11/30/24 1405     Updated: 11/30/24 1410    Narrative:      EXAMINATION: XR FOREARM 2 VW RIGHT- 11/30/2024 2:05 PM     HISTORY: Trauma; N39.0-Urinary tract infection, site not specified;  N17.9-Acute kidney failure, unspecified; N18.9-Chronic kidney disease,  unspecified; R73.9-Hyperglycemia, unspecified; Z74.09-Other reduced  mobility.     REPORT: AP and lateral views of the right forearm were obtained.     COMPARISON: There are no correlative imaging studies for comparison.     Osseous alignment is normal, no fracture is identified. The joint spaces  are preserved. There is ventral soft tissue swelling diffusely.       Impression:      No fracture, no acute osseous abnormality.     This report was signed and finalized on 11/30/2024 2:06 PM by Dr. Percy Cesar MD.       XR Shoulder 2+ View Right [[id number]] Collected: 11/30/24 1403     Updated: 11/30/24 1408    Narrative:      EXAMINATION: XR SHOULDER 2+ VW RIGHT- 11/30/2024 2:03 PM     HISTORY: Glf; N39.0-Urinary tract infection, site not specified;  N17.9-Acute kidney failure, unspecified; N18.9-Chronic kidney disease,  unspecified; R73.9-Hyperglycemia, unspecified; Z74.09-Other reduced  mobility.     REPORT: 3 views of the right shoulder were obtained.     COMPARISON: Right shoulder x-rays 10/20/2024. No fracture or dislocation  is identified, there is spurring of the acromion process and AC joint,  narrowing of the acromiohumeral interval. Lateral downsloping of the  acromion process is also present. There is spurring at the inferior  glenoid process. The soft tissues are within normal limits       Impression:      No fracture, no acute osseous abnormality. Advanced  degenerative changes as described.     This report was signed and finalized on  11/30/2024 2:05 PM by Dr. Percy Cesar MD.               Culture:  Blood Culture   Date Value Ref Range Status   11/18/2024 No growth at 3 days  Preliminary   11/17/2024 No growth at 3 days  Preliminary     Urine Culture   Date Value Ref Range Status   11/17/2024 >100,000 CFU/mL Escherichia coli (A)  Preliminary         Assessment    Acute kidney injury--improving  Baseline chronic kidney disease stage 3b  Hypertension  Uncontrolled type 2 diabetes (A1c 13.4%)  Anemia of CKD  Metabolic acidosis--improved  Acute cystitis  Obesity   S/p dfall    Plan:  Renal function about stable   Monitor labs  Stable for discharge form renal standpoint.       Vinny Hartley MD  12/1/2024  14:36 CST

## 2024-12-02 NOTE — THERAPY DISCHARGE NOTE
Acute Care - Physical Therapy Discharge Summary  [facility]       Patient Name: Erick Luong  : 1955  MRN: [id number]    Today's Date: 2024                 Admit Date: 2024      PT Recommendation and Plan    Visit Dx:    ICD-10-CM ICD-9-CM   1. Acute UTI (urinary tract infection)  N39.0 599.0   2. Acute renal failure superimposed on chronic kidney disease, unspecified acute renal failure type, unspecified CKD stage  N17.9 584.9    N18.9 585.9   3. Acute hyperglycemia  R73.9 790.29   4. Impaired functional mobility and activity tolerance [Z74.09]  Z74.09 V49.89   5. CHF (congestive heart failure), NYHA class I, acute on chronic, combined  I50.43 428.43     428.0   6. Diabetic foot infection  E11.628 250.80    L08.9 686.9   7. Syncope and collapse, recurrent episodes  R55 780.2                PT Rehab Goals       Row Name 24 1100             Bed Mobility Goal 1 (PT)    Activity/Assistive Device (Bed Mobility Goal 1, PT) bed mobility activities, all  -AB      [place] Level/Cues Needed (Bed Mobility Goal 1, PT) independent  -AB      Time Frame (Bed Mobility Goal 1, PT) 10 days  -AB      Progress/Outcomes (Bed Mobility Goal 1, PT) goal not met  -AB         Transfer Goal 1 (PT)    Activity/Assistive Device (Transfer Goal 1, PT) sit-to-stand/stand-to-sit;bed-to-chair/chair-to-bed;other (see comments)  -AB      [place] Level/Cues Needed (Transfer Goal 1, PT) independent  -AB      Time Frame (Transfer Goal 1, PT) long term goal (LTG);10 days  -AB      Progress/Outcome (Transfer Goal 1, PT) goal not met  -AB         Gait Training Goal 1 (PT)    Activity/Assistive Device (Gait Training Goal 1, PT) gait (walking locomotion);assistive device use;decrease fall risk;improve balance and speed;increase endurance/gait distance;increase energy conservation;maintain weight-bearing status;walker, rolling;other (see comments)  -AB      [place] Level (Gait Training Goal 1, PT) contact guard  required  -AB      Distance (Gait Training Goal 1, PT) 50 ft with no pain  -AB      Time Frame (Gait Training Goal 1, PT) long term goal (LTG);10 days  -AB      Progress/Outcome (Gait Training Goal 1, PT) goal not met  -AB         Balance Goal 1 (PT)    Activity/Assistive Device (Balance Goal) sit to stand dynamic balance;standing static balance;standing dynamic balance;walker, rolling  -AB      [place] Level/Cues Needed (Balance Goal 1, PT) independent  -AB      Time Frame (Balance Goal 1, PT) long-term goal (LTG);other (see comments)  -AB      Progress/Outcomes (Balance Goal 1, PT) goal not met  -AB                User Key  (r) = Recorded By, (t) = Taken By, (c) = Cosigned By      Initials Name Provider Type Discipline    Donna Pang, PTA Physical Therapist Assistant PT                        PT Discharge Summary  Anticipated Discharge Disposition (PT): home with assist, home with home health  Reason for Discharge: Discharge from facility  Outcomes Achieved: Refer to plan of care for updates on goals achieved  Discharge Destination: Home with assist      Donna Rodriguez PTA   12/2/2024

## 2024-12-02 NOTE — OUTREACH NOTE
Prep Survey      Flowsheet Row Responses   [facility] facility patient discharged from? [place]   Is LACE score < 7 ? No   Eligibility Readm Mgmt   Discharge diagnosis Pneumonia of left lower lobe due to infectious organism   Does the patient have one of the following disease processes/diagnoses(primary or secondary)? Pneumonia   Does the patient have Home health ordered? No   Is there a DME ordered? No   Prep survey completed? Yes            MARIA DE JESUS RHODES - Registered Nurse

## 2024-12-03 ENCOUNTER — TRANSCRIBE ORDERS (OUTPATIENT)
Dept: ADMINISTRATIVE | Facility: HOSPITAL | Age: 69
End: 2024-12-03
Payer: MEDICARE

## 2024-12-03 DIAGNOSIS — J18.1 LOBAR PNEUMONIA, UNSPECIFIED ORGANISM: Primary | ICD-10-CM

## 2024-12-04 ENCOUNTER — READMISSION MANAGEMENT (OUTPATIENT)
Dept: CALL CENTER | Facility: HOSPITAL | Age: 69
End: 2024-12-04
Payer: MEDICARE

## 2024-12-04 NOTE — OUTREACH NOTE
COPD/PN Week 1 Survey      Flowsheet Row Responses   [facility] facility patient discharged from? [facility]   Does the patient have one of the following disease processes/diagnoses(primary or secondary)? Pneumonia   Week 1 attempt successful? No   Unsuccessful attempts Attempt 1            Dalila RHODES - Registered Nurse

## 2024-12-08 ENCOUNTER — HOSPITAL ENCOUNTER (OUTPATIENT)
Facility: HOSPITAL | Age: 69
Setting detail: OBSERVATION
Discharge: NURSING, REHAB OR LONG-TERM CARE | End: 2024-12-10
Attending: EMERGENCY MEDICINE | Admitting: INTERNAL MEDICINE
Payer: MEDICARE

## 2024-12-08 ENCOUNTER — READMISSION MANAGEMENT (OUTPATIENT)
Dept: CALL CENTER | Facility: HOSPITAL | Age: 69
End: 2024-12-08
Payer: MEDICARE

## 2024-12-08 ENCOUNTER — APPOINTMENT (OUTPATIENT)
Dept: GENERAL RADIOLOGY | Facility: HOSPITAL | Age: 69
End: 2024-12-08
Payer: MEDICARE

## 2024-12-08 DIAGNOSIS — R53.1 GENERALIZED WEAKNESS: ICD-10-CM

## 2024-12-08 DIAGNOSIS — T59.811A SMOKE INHALATION: Primary | ICD-10-CM

## 2024-12-08 DIAGNOSIS — M54.12 CERVICAL RADICULOPATHY: ICD-10-CM

## 2024-12-08 DIAGNOSIS — G89.29 OTHER CHRONIC PAIN: ICD-10-CM

## 2024-12-08 DIAGNOSIS — R26.2 INABILITY TO AMBULATE DUE TO MULTIPLE JOINTS: ICD-10-CM

## 2024-12-08 PROBLEM — G47.34 NOCTURNAL HYPOXIA: Status: ACTIVE | Noted: 2024-12-08

## 2024-12-08 LAB
ALBUMIN SERPL-MCNC: 3.3 G/DL (ref 3.5–5.2)
ALBUMIN/GLOB SERPL: 1.2 G/DL
ALP SERPL-CCNC: 141 U/L (ref 39–117)
ALT SERPL W P-5'-P-CCNC: 10 U/L (ref 1–41)
ANION GAP SERPL CALCULATED.3IONS-SCNC: 12 MMOL/L (ref 5–15)
ARTERIAL PATENCY WRIST A: ABNORMAL
AST SERPL-CCNC: 13 U/L (ref 1–40)
ATMOSPHERIC PRESS: 754 MMHG
BASE EXCESS BLDA CALC-SCNC: -4.5 MMOL/L (ref 0–2)
BASOPHILS # BLD AUTO: 0.05 10*3/MM3 (ref 0–0.2)
BASOPHILS NFR BLD AUTO: 0.5 % (ref 0–1.5)
BDY SITE: ABNORMAL
BILIRUB SERPL-MCNC: 0.6 MG/DL (ref 0–1.2)
BODY TEMPERATURE: 37
BUN SERPL-MCNC: 55 MG/DL (ref 8–23)
BUN/CREAT SERPL: 20.5 (ref 7–25)
CALCIUM SPEC-SCNC: 8.2 MG/DL (ref 8.6–10.5)
CHLORIDE SERPL-SCNC: 108 MMOL/L (ref 98–107)
CO2 SERPL-SCNC: 20 MMOL/L (ref 22–29)
COHGB MFR BLD: 1 % (ref 0–5)
CREAT SERPL-MCNC: 2.68 MG/DL (ref 0.76–1.27)
DEPRECATED RDW RBC AUTO: 50.1 FL (ref 37–54)
EGFRCR SERPLBLD CKD-EPI 2021: 25.1 ML/MIN/1.73
EOSINOPHIL # BLD AUTO: 0.34 10*3/MM3 (ref 0–0.4)
EOSINOPHIL NFR BLD AUTO: 3.5 % (ref 0.3–6.2)
ERYTHROCYTE [DISTWIDTH] IN BLOOD BY AUTOMATED COUNT: 15.4 % (ref 12.3–15.4)
GAS FLOW AIRWAY: 2 LPM
GLOBULIN UR ELPH-MCNC: 2.8 GM/DL
GLUCOSE BLDC GLUCOMTR-MCNC: 335 MG/DL (ref 70–130)
GLUCOSE BLDC GLUCOMTR-MCNC: 372 MG/DL (ref 70–130)
GLUCOSE SERPL-MCNC: 381 MG/DL (ref 65–99)
HCO3 BLDA-SCNC: 20.5 MMOL/L (ref 20–26)
HCT VFR BLD AUTO: 31.3 % (ref 37.5–51)
HCT VFR BLD CALC: 31.6 % (ref 38–51)
HGB BLD-MCNC: 10 G/DL (ref 13–17.7)
HGB BLDA-MCNC: 10.3 G/DL (ref 14–18)
HOLD SPECIMEN: NORMAL
IMM GRANULOCYTES # BLD AUTO: 0.04 10*3/MM3 (ref 0–0.05)
IMM GRANULOCYTES NFR BLD AUTO: 0.4 % (ref 0–0.5)
LYMPHOCYTES # BLD AUTO: 1.22 10*3/MM3 (ref 0.7–3.1)
LYMPHOCYTES NFR BLD AUTO: 12.7 % (ref 19.6–45.3)
Lab: ABNORMAL
MAGNESIUM SERPL-MCNC: 1.8 MG/DL (ref 1.6–2.4)
MCH RBC QN AUTO: 28.5 PG (ref 26.6–33)
MCHC RBC AUTO-ENTMCNC: 31.9 G/DL (ref 31.5–35.7)
MCV RBC AUTO: 89.2 FL (ref 79–97)
METHGB BLD QL: 0.7 % (ref 0–3)
MODALITY: ABNORMAL
MONOCYTES # BLD AUTO: 0.83 10*3/MM3 (ref 0.1–0.9)
MONOCYTES NFR BLD AUTO: 8.6 % (ref 5–12)
NEUTROPHILS NFR BLD AUTO: 7.14 10*3/MM3 (ref 1.7–7)
NEUTROPHILS NFR BLD AUTO: 74.3 % (ref 42.7–76)
NRBC BLD AUTO-RTO: 0 /100 WBC (ref 0–0.2)
OXYHGB MFR BLDV: 93.5 % (ref 94–99)
PCO2 BLDA: 36.7 MM HG (ref 35–45)
PCO2 TEMP ADJ BLD: 36.7 MM HG (ref 35–45)
PH BLDA: 7.36 PH UNITS (ref 7.35–7.45)
PH, TEMP CORRECTED: 7.36 PH UNITS (ref 7.35–7.45)
PLATELET # BLD AUTO: 214 10*3/MM3 (ref 140–450)
PMV BLD AUTO: 11.7 FL (ref 6–12)
PO2 BLDA: 72.9 MM HG (ref 83–108)
PO2 TEMP ADJ BLD: 72.9 MM HG (ref 83–108)
POTASSIUM BLDA-SCNC: 4.2 MMOL/L (ref 3.5–5.2)
POTASSIUM SERPL-SCNC: 4.4 MMOL/L (ref 3.5–5.2)
PROT SERPL-MCNC: 6.1 G/DL (ref 6–8.5)
RBC # BLD AUTO: 3.51 10*6/MM3 (ref 4.14–5.8)
SAO2 % BLDCOA: 95.1 % (ref 94–99)
SODIUM BLDA-SCNC: 144 MMOL/L (ref 136–145)
SODIUM SERPL-SCNC: 140 MMOL/L (ref 136–145)
VENTILATOR MODE: ABNORMAL
WBC NRBC COR # BLD AUTO: 9.62 10*3/MM3 (ref 3.4–10.8)
WHOLE BLOOD HOLD COAG: NORMAL
WHOLE BLOOD HOLD SPECIMEN: NORMAL

## 2024-12-08 PROCEDURE — 85025 COMPLETE CBC W/AUTO DIFF WBC: CPT | Performed by: EMERGENCY MEDICINE

## 2024-12-08 PROCEDURE — 71045 X-RAY EXAM CHEST 1 VIEW: CPT

## 2024-12-08 PROCEDURE — 36600 WITHDRAWAL OF ARTERIAL BLOOD: CPT

## 2024-12-08 PROCEDURE — 63710000001 INSULIN LISPRO (HUMAN) PER 5 UNITS

## 2024-12-08 PROCEDURE — 63710000001 INSULIN GLARGINE PER 5 UNITS

## 2024-12-08 PROCEDURE — 83050 HGB METHEMOGLOBIN QUAN: CPT

## 2024-12-08 PROCEDURE — G0378 HOSPITAL OBSERVATION PER HR: HCPCS

## 2024-12-08 PROCEDURE — 25010000002 METHYLPREDNISOLONE PER 40 MG

## 2024-12-08 PROCEDURE — 94761 N-INVAS EAR/PLS OXIMETRY MLT: CPT

## 2024-12-08 PROCEDURE — 83036 HEMOGLOBIN GLYCOSYLATED A1C: CPT

## 2024-12-08 PROCEDURE — 94799 UNLISTED PULMONARY SVC/PX: CPT

## 2024-12-08 PROCEDURE — 82375 ASSAY CARBOXYHB QUANT: CPT

## 2024-12-08 PROCEDURE — 82805 BLOOD GASES W/O2 SATURATION: CPT

## 2024-12-08 PROCEDURE — 99285 EMERGENCY DEPT VISIT HI MDM: CPT

## 2024-12-08 PROCEDURE — 80053 COMPREHEN METABOLIC PANEL: CPT | Performed by: EMERGENCY MEDICINE

## 2024-12-08 PROCEDURE — 94640 AIRWAY INHALATION TREATMENT: CPT

## 2024-12-08 PROCEDURE — 93005 ELECTROCARDIOGRAM TRACING: CPT

## 2024-12-08 PROCEDURE — 96374 THER/PROPH/DIAG INJ IV PUSH: CPT

## 2024-12-08 PROCEDURE — 82948 REAGENT STRIP/BLOOD GLUCOSE: CPT

## 2024-12-08 PROCEDURE — 83735 ASSAY OF MAGNESIUM: CPT | Performed by: EMERGENCY MEDICINE

## 2024-12-08 RX ORDER — DONEPEZIL HYDROCHLORIDE 10 MG/1
10 TABLET, FILM COATED ORAL NIGHTLY
Status: DISCONTINUED | OUTPATIENT
Start: 2024-12-08 | End: 2024-12-10 | Stop reason: HOSPADM

## 2024-12-08 RX ORDER — FAMOTIDINE 20 MG/1
20 TABLET, FILM COATED ORAL 2 TIMES DAILY
Status: DISCONTINUED | OUTPATIENT
Start: 2024-12-08 | End: 2024-12-09

## 2024-12-08 RX ORDER — BISACODYL 10 MG
10 SUPPOSITORY, RECTAL RECTAL DAILY PRN
Status: DISCONTINUED | OUTPATIENT
Start: 2024-12-08 | End: 2024-12-10 | Stop reason: HOSPADM

## 2024-12-08 RX ORDER — ONDANSETRON 2 MG/ML
4 INJECTION INTRAMUSCULAR; INTRAVENOUS EVERY 6 HOURS PRN
Status: DISCONTINUED | OUTPATIENT
Start: 2024-12-08 | End: 2024-12-10 | Stop reason: HOSPADM

## 2024-12-08 RX ORDER — POTASSIUM CHLORIDE 750 MG/1
10 CAPSULE, EXTENDED RELEASE ORAL DAILY
Status: DISCONTINUED | OUTPATIENT
Start: 2024-12-08 | End: 2024-12-10 | Stop reason: HOSPADM

## 2024-12-08 RX ORDER — IPRATROPIUM BROMIDE AND ALBUTEROL SULFATE 2.5; .5 MG/3ML; MG/3ML
3 SOLUTION RESPIRATORY (INHALATION) 4 TIMES DAILY PRN
Status: DISCONTINUED | OUTPATIENT
Start: 2024-12-08 | End: 2024-12-10 | Stop reason: HOSPADM

## 2024-12-08 RX ORDER — DULOXETIN HYDROCHLORIDE 30 MG/1
60 CAPSULE, DELAYED RELEASE ORAL DAILY
Status: DISCONTINUED | OUTPATIENT
Start: 2024-12-08 | End: 2024-12-10 | Stop reason: HOSPADM

## 2024-12-08 RX ORDER — SODIUM CHLORIDE 0.9 % (FLUSH) 0.9 %
10 SYRINGE (ML) INJECTION EVERY 12 HOURS SCHEDULED
Status: DISCONTINUED | OUTPATIENT
Start: 2024-12-08 | End: 2024-12-10 | Stop reason: HOSPADM

## 2024-12-08 RX ORDER — NICOTINE POLACRILEX 4 MG
15 LOZENGE BUCCAL
Status: DISCONTINUED | OUTPATIENT
Start: 2024-12-08 | End: 2024-12-10 | Stop reason: HOSPADM

## 2024-12-08 RX ORDER — LISINOPRIL 10 MG/1
5 TABLET ORAL DAILY
Status: DISCONTINUED | OUTPATIENT
Start: 2024-12-08 | End: 2024-12-10 | Stop reason: HOSPADM

## 2024-12-08 RX ORDER — SODIUM CHLORIDE 0.9 % (FLUSH) 0.9 %
10 SYRINGE (ML) INJECTION AS NEEDED
Status: DISCONTINUED | OUTPATIENT
Start: 2024-12-08 | End: 2024-12-09

## 2024-12-08 RX ORDER — IPRATROPIUM BROMIDE AND ALBUTEROL SULFATE 2.5; .5 MG/3ML; MG/3ML
3 SOLUTION RESPIRATORY (INHALATION)
Status: DISCONTINUED | OUTPATIENT
Start: 2024-12-08 | End: 2024-12-08

## 2024-12-08 RX ORDER — IBUPROFEN 600 MG/1
1 TABLET ORAL
Status: DISCONTINUED | OUTPATIENT
Start: 2024-12-08 | End: 2024-12-10 | Stop reason: HOSPADM

## 2024-12-08 RX ORDER — NITROGLYCERIN 0.4 MG/1
0.4 TABLET SUBLINGUAL
Status: DISCONTINUED | OUTPATIENT
Start: 2024-12-08 | End: 2024-12-10 | Stop reason: HOSPADM

## 2024-12-08 RX ORDER — SODIUM CHLORIDE 9 MG/ML
40 INJECTION, SOLUTION INTRAVENOUS AS NEEDED
Status: DISCONTINUED | OUTPATIENT
Start: 2024-12-08 | End: 2024-12-10 | Stop reason: HOSPADM

## 2024-12-08 RX ORDER — DEXTROSE MONOHYDRATE 25 G/50ML
25 INJECTION, SOLUTION INTRAVENOUS
Status: DISCONTINUED | OUTPATIENT
Start: 2024-12-08 | End: 2024-12-10 | Stop reason: HOSPADM

## 2024-12-08 RX ORDER — INSULIN LISPRO 100 [IU]/ML
2-9 INJECTION, SOLUTION INTRAVENOUS; SUBCUTANEOUS
Status: DISCONTINUED | OUTPATIENT
Start: 2024-12-08 | End: 2024-12-09

## 2024-12-08 RX ORDER — TAMSULOSIN HYDROCHLORIDE 0.4 MG/1
0.4 CAPSULE ORAL DAILY
Status: DISCONTINUED | OUTPATIENT
Start: 2024-12-08 | End: 2024-12-10 | Stop reason: HOSPADM

## 2024-12-08 RX ORDER — PREGABALIN 100 MG/1
200 CAPSULE ORAL NIGHTLY
Status: DISCONTINUED | OUTPATIENT
Start: 2024-12-08 | End: 2024-12-10 | Stop reason: HOSPADM

## 2024-12-08 RX ORDER — METHYLPREDNISOLONE SODIUM SUCCINATE 125 MG/2ML
125 INJECTION, POWDER, LYOPHILIZED, FOR SOLUTION INTRAMUSCULAR; INTRAVENOUS ONCE
Status: DISCONTINUED | OUTPATIENT
Start: 2024-12-08 | End: 2024-12-10

## 2024-12-08 RX ORDER — PREGABALIN 100 MG/1
100 CAPSULE ORAL DAILY
Status: DISCONTINUED | OUTPATIENT
Start: 2024-12-09 | End: 2024-12-10 | Stop reason: HOSPADM

## 2024-12-08 RX ORDER — ASCORBIC ACID 500 MG
1000 TABLET ORAL DAILY
Status: DISCONTINUED | OUTPATIENT
Start: 2024-12-08 | End: 2024-12-10 | Stop reason: HOSPADM

## 2024-12-08 RX ORDER — BUSPIRONE HYDROCHLORIDE 10 MG/1
10 TABLET ORAL 3 TIMES DAILY PRN
Status: DISCONTINUED | OUTPATIENT
Start: 2024-12-08 | End: 2024-12-10 | Stop reason: HOSPADM

## 2024-12-08 RX ORDER — PANTOPRAZOLE SODIUM 40 MG/1
40 TABLET, DELAYED RELEASE ORAL 2 TIMES DAILY
Status: DISCONTINUED | OUTPATIENT
Start: 2024-12-08 | End: 2024-12-10 | Stop reason: HOSPADM

## 2024-12-08 RX ORDER — ROSUVASTATIN CALCIUM 10 MG/1
10 TABLET, COATED ORAL DAILY
Status: DISCONTINUED | OUTPATIENT
Start: 2024-12-08 | End: 2024-12-10 | Stop reason: HOSPADM

## 2024-12-08 RX ORDER — INSULIN GLARGINE 100 [IU]/ML
35 INJECTION, SOLUTION SUBCUTANEOUS NIGHTLY
Status: DISCONTINUED | OUTPATIENT
Start: 2024-12-08 | End: 2024-12-10 | Stop reason: HOSPADM

## 2024-12-08 RX ORDER — AMOXICILLIN 250 MG
2 CAPSULE ORAL 2 TIMES DAILY
Status: DISCONTINUED | OUTPATIENT
Start: 2024-12-08 | End: 2024-12-10 | Stop reason: HOSPADM

## 2024-12-08 RX ORDER — SODIUM CHLORIDE 0.9 % (FLUSH) 0.9 %
10 SYRINGE (ML) INJECTION AS NEEDED
Status: DISCONTINUED | OUTPATIENT
Start: 2024-12-08 | End: 2024-12-10 | Stop reason: HOSPADM

## 2024-12-08 RX ORDER — POLYETHYLENE GLYCOL 3350 17 G/17G
17 POWDER, FOR SOLUTION ORAL DAILY PRN
Status: DISCONTINUED | OUTPATIENT
Start: 2024-12-08 | End: 2024-12-10 | Stop reason: HOSPADM

## 2024-12-08 RX ORDER — BUMETANIDE 0.25 MG/ML
1 INJECTION, SOLUTION INTRAMUSCULAR; INTRAVENOUS ONCE
Status: CANCELLED | OUTPATIENT
Start: 2024-12-08 | End: 2024-12-08

## 2024-12-08 RX ORDER — BISACODYL 5 MG/1
5 TABLET, DELAYED RELEASE ORAL DAILY PRN
Status: DISCONTINUED | OUTPATIENT
Start: 2024-12-08 | End: 2024-12-10 | Stop reason: HOSPADM

## 2024-12-08 RX ORDER — OXYCODONE HYDROCHLORIDE 5 MG/1
10 TABLET ORAL 2 TIMES DAILY PRN
Status: DISCONTINUED | OUTPATIENT
Start: 2024-12-08 | End: 2024-12-10 | Stop reason: HOSPADM

## 2024-12-08 RX ORDER — ONDANSETRON 4 MG/1
4 TABLET, ORALLY DISINTEGRATING ORAL EVERY 6 HOURS PRN
Status: DISCONTINUED | OUTPATIENT
Start: 2024-12-08 | End: 2024-12-10 | Stop reason: HOSPADM

## 2024-12-08 RX ORDER — METHYLPREDNISOLONE SODIUM SUCCINATE 40 MG/ML
40 INJECTION, POWDER, LYOPHILIZED, FOR SOLUTION INTRAMUSCULAR; INTRAVENOUS EVERY 12 HOURS
Status: DISCONTINUED | OUTPATIENT
Start: 2024-12-08 | End: 2024-12-10 | Stop reason: HOSPADM

## 2024-12-08 RX ORDER — LORAZEPAM 0.5 MG/1
0.5 TABLET ORAL EVERY 6 HOURS PRN
Status: DISCONTINUED | OUTPATIENT
Start: 2024-12-08 | End: 2024-12-10 | Stop reason: HOSPADM

## 2024-12-08 RX ADMIN — BUSPIRONE HYDROCHLORIDE 10 MG: 10 TABLET ORAL at 20:56

## 2024-12-08 RX ADMIN — METHYLPREDNISOLONE SODIUM SUCCINATE 40 MG: 40 INJECTION, POWDER, FOR SOLUTION INTRAMUSCULAR; INTRAVENOUS at 17:49

## 2024-12-08 RX ADMIN — INSULIN LISPRO 8 UNITS: 100 INJECTION, SOLUTION INTRAVENOUS; SUBCUTANEOUS at 21:21

## 2024-12-08 RX ADMIN — DULOXETINE HYDROCHLORIDE 60 MG: 30 CAPSULE, DELAYED RELEASE ORAL at 17:48

## 2024-12-08 RX ADMIN — OXYCODONE HYDROCHLORIDE AND ACETAMINOPHEN 1000 MG: 500 TABLET ORAL at 17:48

## 2024-12-08 RX ADMIN — PANTOPRAZOLE SODIUM 40 MG: 40 TABLET, DELAYED RELEASE ORAL at 20:56

## 2024-12-08 RX ADMIN — Medication 10 ML: at 20:57

## 2024-12-08 RX ADMIN — DOCUSATE SODIUM 50 MG AND SENNOSIDES 8.6 MG 2 TABLET: 8.6; 5 TABLET, FILM COATED ORAL at 20:56

## 2024-12-08 RX ADMIN — LISINOPRIL 5 MG: 10 TABLET ORAL at 17:49

## 2024-12-08 RX ADMIN — DONEPEZIL HYDROCHLORIDE 10 MG: 10 TABLET, FILM COATED ORAL at 20:56

## 2024-12-08 RX ADMIN — PREGABALIN 200 MG: 100 CAPSULE ORAL at 20:56

## 2024-12-08 RX ADMIN — Medication 5 MG: at 20:56

## 2024-12-08 RX ADMIN — ROSUVASTATIN 10 MG: 10 TABLET, FILM COATED ORAL at 17:50

## 2024-12-08 RX ADMIN — TAMSULOSIN HYDROCHLORIDE 0.4 MG: 0.4 CAPSULE ORAL at 17:50

## 2024-12-08 RX ADMIN — POTASSIUM CHLORIDE 10 MEQ: 750 CAPSULE, EXTENDED RELEASE ORAL at 17:50

## 2024-12-08 RX ADMIN — IPRATROPIUM BROMIDE AND ALBUTEROL SULFATE 3 ML: .5; 3 SOLUTION RESPIRATORY (INHALATION) at 19:29

## 2024-12-08 RX ADMIN — OXYCODONE HYDROCHLORIDE 10 MG: 5 TABLET ORAL at 17:47

## 2024-12-08 RX ADMIN — FAMOTIDINE 20 MG: 20 TABLET, FILM COATED ORAL at 20:56

## 2024-12-08 RX ADMIN — APIXABAN 5 MG: 5 TABLET, FILM COATED ORAL at 20:56

## 2024-12-08 RX ADMIN — INSULIN GLARGINE 35 UNITS: 100 INJECTION, SOLUTION SUBCUTANEOUS at 21:21

## 2024-12-08 RX ADMIN — INSULIN LISPRO 7 UNITS: 100 INJECTION, SOLUTION INTRAVENOUS; SUBCUTANEOUS at 17:50

## 2024-12-08 RX ADMIN — IPRATROPIUM BROMIDE AND ALBUTEROL SULFATE 3 ML: .5; 3 SOLUTION RESPIRATORY (INHALATION) at 15:55

## 2024-12-08 NOTE — ED NOTES
Patient placed in wheelchair and assisted to wife room per verbal orders Dr. Arriaza. Patient vitals stable at this time. Patient tolerated transport well.
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
## 2024-12-08 NOTE — ED NOTES
Dressing change to left foot. No s/s infection noted at this time. Patient tolerated well. Vaseline dressing with bulky dressing applied.

## 2024-12-08 NOTE — ED NOTES
Dr. Arriaza speaking with family in regards to patient and patient wife discharge. , Mariah notified of patient and patient family concerns about discharge. States that she will come and speak with patient and patient family shortly.

## 2024-12-08 NOTE — DISCHARGE PLACEMENT REQUEST
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 gas.     Bladder is distended and appears normal.     Kidneys are normal in echotexture. The right kidney measures 9.8 x 5.8 x  5.4 cm. There is an exophytic right mid 4.5 cm renal cyst. The left  kidney measures 11.2 x 5.9 x 7.5 cm. No left renal cyst. No  solid-appearing renal mass. No obstructing intrarenal stones. No  hydronephrosis. No abnormal perinephric fluid collection.        Impression:       1. Simple 4.5 cm right mid renal cyst. Otherwise normal sonographic  appearance of the kidneys.  2. Distended normal-appearing bladder.        This report was signed and finalized on 11/19/2024 8:40 AM by Dr. Yuliana Calhoun MD.        XR Chest 1 View [[id number]] Collected: 11/17/24 2138       Updated: 11/17/24 2143     Narrative:       XR CHEST 1 VW- 11/17/2024 8:33 PM     HISTORY: cough       COMPARISON: 10/20/2024     FINDINGS:  Upright frontal radiograph of the chest was obtained     No lung consolidation. No pleural effusion or pneumothorax. Prior  coronary bypass. Heart size is stable. Pulmonary vasculature are  nondilated. The osseous structures and surrounding soft  tissues  demonstrate no acute abnormality.        Impression:       1.  Stable chest exam without acute process. No visualized acute  infiltrate.  2.  Previous coronary bypass.     This report was signed and finalized on 11/17/2024 9:39 PM by Dr King Ceballos.                LAB RESULTS:              Lab 12/01/24 0454 11/30/24  0606 11/29/24  0600 11/28/24  0424 11/27/24  0301   WBC 5.61 5.39 4.55 5.32 7.23   HEMOGLOBIN 9.8* 9.7* 10.1* 10.5* 11.1*   HEMATOCRIT 30.7* 30.5* 31.9* 32.4* 34.3*   PLATELETS 202 191 183 197 260   MCV 87.7 88.4 88.1 87.8 88.2   PROCALCITONIN  --   --   --   --  0.08                  Lab 12/01/24 0454 11/30/24  0606 11/29/24  0600 11/28/24  0424 11/27/24  0301   SODIUM 136 139 138 137 140   POTASSIUM 4.2 4.6 4.8 4.3 4.7   CHLORIDE 105 108* 106 104 108*   CO2 22.0 22.0 23.0 20.0* 22.0   ANION GAP 9.0 9.0 9.0 13.0 10.0   BUN 43* 44* 41* 35* 39*   CREATININE 2.35* 2.33* 2.27* 2.23* 2.21*   EGFR 29.4* 29.7* 30.7* 31.3* 31.7*   GLUCOSE 143* 144* 131* 125* 195*   CALCIUM 8.4* 8.2* 8.4* 8.4* 8.8                  Lab 11/27/24  0301   PROBNP 6,325.0*                  Brief Urine Lab Results  (Last result in the past 365 days)          Color   Clarity   Blood   Leuk Est   Nitrite   Protein   CREAT   Urine HCG         11/23/24 1235 Yellow    Clear    Negative    Negative    Negative    30 mg/dL (1+)                          Microbiology Results (last 10 days)         Procedure Component Value - Date/Time     MRSA Screen, PCR (Inpatient) - Swab, Nares [[id number]]  (Abnormal) Collected: 11/27/24 1844     Lab Status: Final result Specimen: Swab from Nares Updated: 11/27/24 2038       MRSA PCR MRSA Detected     Narrative:       The negative predictive value of this diagnostic test is high and should only be used to consider de-escalating anti-MRSA therapy. A positive result may indicate colonization with MRSA and must be correlated clinically.     Respiratory Panel PCR w/COVID-19(SARS-CoV-2)  [facility] In-House, NP Swab in UTM/VTM, 2 HR TAT - Swab, Nasopharynx [[id number]]  (Normal) Collected: 11/26/24 1807     Lab Status: Final result Specimen: Swab from Nasopharynx Updated: 11/26/24 1920       ADENOVIRUS, PCR Not Detected       Coronavirus 229E Not Detected       Coronavirus HKU1 Not Detected       Coronavirus NL63 Not Detected       Coronavirus OC43 Not Detected       COVID19 Not Detected       Human Metapneumovirus Not Detected       Human Rhinovirus/Enterovirus Not Detected       Influenza A PCR Not Detected       Influenza B PCR Not Detected       Parainfluenza Virus 1 Not Detected       Parainfluenza Virus 2 Not Detected       Parainfluenza Virus 3 Not Detected       Parainfluenza Virus 4 Not Detected       RSV, PCR Not Detected       Bordetella pertussis pcr Not Detected       Bordetella parapertussis PCR Not Detected       Chlamydophila pneumoniae PCR Not Detected       Mycoplasma pneumo by PCR Not Detected     Narrative:       In the setting of a positive respiratory panel with a viral infection PLUS a negative procalcitonin without other underlying concern for bacterial infection, consider observing off antibiotics or discontinuation of antibiotics and continue supportive care. If the respiratory panel is positive for atypical bacterial infection (Bordetella pertussis, Chlamydophila pneumoniae, or Mycoplasma pneumoniae), consider antibiotic de-escalation to target atypical bacterial infection.                Hospital Course:   Erick Luong is a 69 y.o. male with past medical history of coronary artery disease, CABG, Afib, diabetes mellitus insulin-dependent, diabetic foot ulcer, hypertension, sleep apnea noncompliant with CPAP, right seventh rib fracture on 10/20/2024, presents emergency room with complaints of mental status changes disorientation suprapubic pain and burning with urination.  Found to have ESBL E. coli UTI and WANDER.  Later developed left lower lobe pneumonia/HAP.    "  # ESBL E. coli UTI - completed 10 days of Ertapenem     #  Left lower lobe pneumonia  # Hospital-acquired pneumonia  Some wheezing noted on physical exam 11/27  Chest x-ray showing left lower lobe pneumonia  Respiratory PCR panel negative, MRSA screen positive   Allergic to Vancomycin.  Cannot use Linezolid due to concurrent use of Cymbalta and BuSpar which increases risk of serotonin syndrome.  - MRSA coverage with Doxycyline - started 11/28  - PO Levaquin for Pseudomonas coverage - renal dosing q48h  - Patient has stable vitals, WBC 4, feeling better - can be on oral regimen  - Discharged with 3 additional days of Doxy and Levaquin.        # WANDER on CKD 3b   Cr 2.83 >>> 3.00 > 2.55 > 2.15 > 2.3 today   - Nephrology following  -- recommend encouraging p.o. intake  - Bumex held  - Patient follows up with Dr. Lomas outpatient, will schedule appointment within 1 week     # Wounds on Bilateral Feet  - Evaluated by wound care   -Will continue to follow-up with wound care outpatient     # GLF   In hospital 11/29  No head trauma, no LOW   Xray right shoulder and right forearm no fracture  Abrasions present  - Referral for home health and PT     # Hypertension - continue lisinopril  # Hyperlipidemia - continue crestor  # Diabetes mellitus - refusing lantus 20, lispro 10/10/10, agrees to some SSI.  Can continue home regimen at discharge.  # Depression - continue Buspar and Cymbalta  # A-fib - continue Eliquis  # Constipation - docusate and pericolace.    # BPH - continue home flomax         Physical Exam on Discharge:  /65 (BP Location: Left arm, Patient Position: Lying)   Pulse 64   Temp 97.9 °F (36.6 °C)   Resp 16   Ht 182.9 cm (72\")   Wt 118 kg (259 lb 3.2 oz)   SpO2 97%   BMI 35.15 kg/m²   Physical Exam  GEN: Awake, alert, interactive, in NAD, obese  HEENT: Atraumatic,  EOMI, Anicteric,   Lungs: Inspiratory wheeze on right side improved  Heart: RRR, +S1/s2, no rub  ABD: soft, nontender, +BS, no " guarding/rebound  Extremities: atraumatic, no cyanosis, no edema  Skin: bilateral wounds on feet.  Dry skin abrasion on right shoulder.  Abrasion on right forearm covered in clean dry dressing.  Neuro: AAOx3, no focal deficits  Psych: normal mood & affect     Condition on Discharge: Stable     Discharge Disposition:  Home or Self Care .  Ambulatory referral for HH/PT.     Discharge Medications:      Discharge Medications          New Medications         Instructions Start Date   doxycycline 100 MG tablet  Commonly known as: ADOXA    100 mg, Oral, Every 12 Hours Scheduled        levoFLOXacin 750 MG tablet  Commonly known as: LEVAQUIN    750 mg, Oral, Every Other Day, Take one tablet on Tuesday and one on Thursday    Start Date: December 3, 2024                Continue These Medications         Instructions Start Date   ascorbic acid 1000 MG tablet  Commonly known as: VITAMIN C    1,000 mg, Oral, Daily        busPIRone 10 MG tablet  Commonly known as: BUSPAR    10 mg, Oral, 3 Times Daily PRN        donepezil 10 MG tablet  Commonly known as: ARICEPT    10 mg, Oral, Every Night at Bedtime        DULoxetine 60 MG capsule  Commonly known as: CYMBALTA    60 mg, Oral, Daily        Eliquis 5 MG tablet tablet  Generic drug: apixaban    5 mg, Oral, 2 Times Daily        famotidine 20 MG tablet  Commonly known as: PEPCID    20 mg, Oral, 2 Times Daily        Insulin Lispro 100 UNIT/ML injection  Commonly known as: humaLOG    2-12 Units, Subcutaneous, 4 Times Daily Before Meals & Nightly, Inject subcutaneously four times a day per sliding scale:  0-150 = 0 units; 151-200 = 2u; 201-250 = 4u; 251-300 = 6u; 301-350 = 8u; 351-400 = 10u; 401+ = 12u        Lantus 100 UNIT/ML injection  Generic drug: insulin glargine    35 Units, Subcutaneous, Every Night at Bedtime        lisinopril 5 MG tablet  Commonly known as: PRINIVIL,ZESTRIL    5 mg, Oral, Daily        melatonin 3 MG tablet    6 mg, Oral, Nightly PRN        nitroglycerin 0.4 MG SL  tablet  Commonly known as: NITROSTAT    0.4 mg, Sublingual, Every 5 Minutes PRN, Take no more than 3 doses in 15 minutes.        oxyCODONE 10 MG tablet  Commonly known as: ROXICODONE    10 mg, Oral, 2 Times Daily PRN        pantoprazole 40 MG EC tablet  Commonly known as: PROTONIX    40 mg, Oral, 2 Times Daily        polyethylene glycol 17 GM/SCOOP powder  Commonly known as: MIRALAX    17 g, Oral, Daily PRN        potassium chloride ER 20 MEQ tablet controlled-release ER tablet  Commonly known as: K-TAB    20 mEq, Oral, Daily        pregabalin 100 MG capsule  Commonly known as: LYRICA    100 mg, Oral, Daily        pregabalin 100 MG capsule  Commonly known as: LYRICA    200 mg, Oral, Nightly        rosuvastatin 10 MG tablet  Commonly known as: CRESTOR    10 mg, Oral, Daily        sennosides-docusate 8.6-50 MG per tablet  Commonly known as: PERICOLACE    1 tablet, Oral, Nightly, Obtain OTC        sodium hypochlorite 0.125 % solution topical solution 0.125%  Commonly known as: DAKIN'S 1/4 STRENGTH    Topical, 2 Times Daily        tamsulosin 0.4 MG capsule 24 hr capsule  Commonly known as: FLOMAX    0.4 mg, Oral, Daily                  Stop These Medications       bumetanide 2 MG tablet  Commonly known as: BUMEX      midodrine 2.5 MG tablet  Commonly known as: PROAMATINE                   Discharge Diet:   Dietary Orders (From admission, onward)          Start     Ordered     11/19/24 0749   Diet: Diabetic; Consistent Carbohydrate; Fluid Consistency: Thin (IDDSI 0)  Diet Effective Now        References:    Diet Order [facility]   Question Answer Comment   Diets: Diabetic     Diabetic Diet: Consistent Carbohydrate     Fluid Consistency: Thin (IDDSI 0)         11/19/24 0748                             Activity at Discharge: as tolerated     Follow-up Appointments:   No future appointments.     Test Results Pending at Discharge: none      Electronically signed by Germania Sheridan MD, 12/01/24, 11:57 CST.     Time: 35  "minutes.                         ED to Hosp-Admission (Discharged) on 11/17/2024            Revision & Routing History            Detailed Report            ROS Info          Note shared with patient  Additional Documentation    Vitals: /65 (BP Location: Left arm, Patient Position: Lying)     Pulse 64     Temp 97.9 °F (36.6 °C)     Resp 16     Ht 182.9 cm (72\")     Wt 118 kg (259 lb 3.2 oz)     SpO2 97%     BMI 35.15 kg/m²     BSA 2.38 m²          More Vitals   Flowsheets: HPI,     Acuity/Destination,     Triage Complete,     Vital Signs,     Pain,     ...(90 more)   Encounter Info: Billing Info,     History,     Allergies,     Detailed Report     Additional Orders and Documentation      Results  Imaging  Microbiology         Meds           Orders  Procedures         Flowsheets      Encounter Info: History,     Allergies,     Education,     Care Plan,     Detailed Report     Encounter Information    Encounter Information   Department Encounter #   11/17/2024  8:40 PM  [facility] [id number]       "
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
## 2024-12-09 LAB
ANION GAP SERPL CALCULATED.3IONS-SCNC: 10 MMOL/L (ref 5–15)
ARTERIAL PATENCY WRIST A: POSITIVE
ATMOSPHERIC PRESS: 749 MMHG
BASE EXCESS BLDA CALC-SCNC: -3.8 MMOL/L (ref 0–2)
BDY SITE: ABNORMAL
BODY TEMPERATURE: 37
BUN SERPL-MCNC: 54 MG/DL (ref 8–23)
BUN/CREAT SERPL: 23.6 (ref 7–25)
CALCIUM SPEC-SCNC: 8.3 MG/DL (ref 8.6–10.5)
CHLORIDE SERPL-SCNC: 110 MMOL/L (ref 98–107)
CO2 SERPL-SCNC: 20 MMOL/L (ref 22–29)
CREAT SERPL-MCNC: 2.29 MG/DL (ref 0.76–1.27)
EGFRCR SERPLBLD CKD-EPI 2021: 30.3 ML/MIN/1.73
GLUCOSE BLDC GLUCOMTR-MCNC: 298 MG/DL (ref 70–130)
GLUCOSE BLDC GLUCOMTR-MCNC: 311 MG/DL (ref 70–130)
GLUCOSE BLDC GLUCOMTR-MCNC: 336 MG/DL (ref 70–130)
GLUCOSE SERPL-MCNC: 331 MG/DL (ref 65–99)
HBA1C MFR BLD: 8.6 % (ref 4.8–5.6)
HCO3 BLDA-SCNC: 20.4 MMOL/L (ref 20–26)
Lab: ABNORMAL
MODALITY: ABNORMAL
PCO2 BLDA: 32.7 MM HG (ref 35–45)
PCO2 TEMP ADJ BLD: 32.7 MM HG (ref 35–45)
PH BLDA: 7.4 PH UNITS (ref 7.35–7.45)
PH, TEMP CORRECTED: 7.4 PH UNITS (ref 7.35–7.45)
PO2 BLDA: 71.8 MM HG (ref 83–108)
PO2 TEMP ADJ BLD: 71.8 MM HG (ref 83–108)
POTASSIUM SERPL-SCNC: 4.4 MMOL/L (ref 3.5–5.2)
QT INTERVAL: 352 MS
QTC INTERVAL: 449 MS
SAO2 % BLDCOA: 95.9 % (ref 94–99)
SODIUM SERPL-SCNC: 140 MMOL/L (ref 136–145)
VENTILATOR MODE: ABNORMAL

## 2024-12-09 PROCEDURE — 96376 TX/PRO/DX INJ SAME DRUG ADON: CPT

## 2024-12-09 PROCEDURE — 94799 UNLISTED PULMONARY SVC/PX: CPT

## 2024-12-09 PROCEDURE — 94669 MECHANICAL CHEST WALL OSCILL: CPT

## 2024-12-09 PROCEDURE — 80048 BASIC METABOLIC PNL TOTAL CA: CPT | Performed by: INTERNAL MEDICINE

## 2024-12-09 PROCEDURE — 63710000001 INSULIN LISPRO (HUMAN) PER 5 UNITS

## 2024-12-09 PROCEDURE — 97165 OT EVAL LOW COMPLEX 30 MIN: CPT

## 2024-12-09 PROCEDURE — 63710000001 INSULIN GLARGINE PER 5 UNITS

## 2024-12-09 PROCEDURE — G0378 HOSPITAL OBSERVATION PER HR: HCPCS

## 2024-12-09 PROCEDURE — 82948 REAGENT STRIP/BLOOD GLUCOSE: CPT

## 2024-12-09 PROCEDURE — 94761 N-INVAS EAR/PLS OXIMETRY MLT: CPT

## 2024-12-09 PROCEDURE — 82803 BLOOD GASES ANY COMBINATION: CPT

## 2024-12-09 PROCEDURE — 36600 WITHDRAWAL OF ARTERIAL BLOOD: CPT

## 2024-12-09 PROCEDURE — 25010000002 METHYLPREDNISOLONE PER 40 MG

## 2024-12-09 PROCEDURE — 97161 PT EVAL LOW COMPLEX 20 MIN: CPT | Performed by: PHYSICAL THERAPIST

## 2024-12-09 RX ORDER — FAMOTIDINE 20 MG/1
20 TABLET, FILM COATED ORAL DAILY
Status: DISCONTINUED | OUTPATIENT
Start: 2024-12-10 | End: 2024-12-10 | Stop reason: HOSPADM

## 2024-12-09 RX ORDER — INSULIN LISPRO 100 [IU]/ML
3-14 INJECTION, SOLUTION INTRAVENOUS; SUBCUTANEOUS
Status: DISCONTINUED | OUTPATIENT
Start: 2024-12-09 | End: 2024-12-10 | Stop reason: HOSPADM

## 2024-12-09 RX ADMIN — METHYLPREDNISOLONE SODIUM SUCCINATE 40 MG: 40 INJECTION, POWDER, FOR SOLUTION INTRAMUSCULAR; INTRAVENOUS at 06:26

## 2024-12-09 RX ADMIN — INSULIN LISPRO 12 UNITS: 100 INJECTION, SOLUTION INTRAVENOUS; SUBCUTANEOUS at 17:21

## 2024-12-09 RX ADMIN — DOCUSATE SODIUM 50 MG AND SENNOSIDES 8.6 MG 2 TABLET: 8.6; 5 TABLET, FILM COATED ORAL at 08:34

## 2024-12-09 RX ADMIN — LORAZEPAM 0.5 MG: 0.5 TABLET ORAL at 08:34

## 2024-12-09 RX ADMIN — DOCUSATE SODIUM 50 MG AND SENNOSIDES 8.6 MG 2 TABLET: 8.6; 5 TABLET, FILM COATED ORAL at 20:51

## 2024-12-09 RX ADMIN — ROSUVASTATIN 10 MG: 10 TABLET, FILM COATED ORAL at 08:34

## 2024-12-09 RX ADMIN — POTASSIUM CHLORIDE 10 MEQ: 750 CAPSULE, EXTENDED RELEASE ORAL at 08:34

## 2024-12-09 RX ADMIN — INSULIN GLARGINE 35 UNITS: 100 INJECTION, SOLUTION SUBCUTANEOUS at 21:04

## 2024-12-09 RX ADMIN — APIXABAN 5 MG: 5 TABLET, FILM COATED ORAL at 08:34

## 2024-12-09 RX ADMIN — Medication 10 ML: at 08:38

## 2024-12-09 RX ADMIN — Medication 10 ML: at 20:51

## 2024-12-09 RX ADMIN — LISINOPRIL 5 MG: 10 TABLET ORAL at 08:34

## 2024-12-09 RX ADMIN — INSULIN LISPRO 10 UNITS: 100 INJECTION, SOLUTION INTRAVENOUS; SUBCUTANEOUS at 12:01

## 2024-12-09 RX ADMIN — OXYCODONE HYDROCHLORIDE 10 MG: 5 TABLET ORAL at 11:36

## 2024-12-09 RX ADMIN — Medication 5 MG: at 20:51

## 2024-12-09 RX ADMIN — OXYCODONE HYDROCHLORIDE AND ACETAMINOPHEN 1000 MG: 500 TABLET ORAL at 08:34

## 2024-12-09 RX ADMIN — DULOXETINE HYDROCHLORIDE 60 MG: 30 CAPSULE, DELAYED RELEASE ORAL at 08:34

## 2024-12-09 RX ADMIN — INSULIN LISPRO 10 UNITS: 100 INJECTION, SOLUTION INTRAVENOUS; SUBCUTANEOUS at 08:37

## 2024-12-09 RX ADMIN — PREGABALIN 100 MG: 100 CAPSULE ORAL at 08:34

## 2024-12-09 RX ADMIN — TAMSULOSIN HYDROCHLORIDE 0.4 MG: 0.4 CAPSULE ORAL at 08:34

## 2024-12-09 RX ADMIN — INSULIN LISPRO 8 UNITS: 100 INJECTION, SOLUTION INTRAVENOUS; SUBCUTANEOUS at 21:04

## 2024-12-09 RX ADMIN — OXYCODONE HYDROCHLORIDE 10 MG: 5 TABLET ORAL at 23:00

## 2024-12-09 RX ADMIN — FAMOTIDINE 20 MG: 20 TABLET, FILM COATED ORAL at 08:34

## 2024-12-09 RX ADMIN — LORAZEPAM 0.5 MG: 0.5 TABLET ORAL at 00:43

## 2024-12-09 RX ADMIN — DONEPEZIL HYDROCHLORIDE 10 MG: 10 TABLET, FILM COATED ORAL at 20:51

## 2024-12-09 RX ADMIN — BUSPIRONE HYDROCHLORIDE 10 MG: 10 TABLET ORAL at 20:51

## 2024-12-09 RX ADMIN — PANTOPRAZOLE SODIUM 40 MG: 40 TABLET, DELAYED RELEASE ORAL at 20:50

## 2024-12-09 RX ADMIN — METHYLPREDNISOLONE SODIUM SUCCINATE 40 MG: 40 INJECTION, POWDER, FOR SOLUTION INTRAMUSCULAR; INTRAVENOUS at 17:21

## 2024-12-09 RX ADMIN — PANTOPRAZOLE SODIUM 40 MG: 40 TABLET, DELAYED RELEASE ORAL at 08:34

## 2024-12-09 RX ADMIN — APIXABAN 5 MG: 5 TABLET, FILM COATED ORAL at 20:51

## 2024-12-09 RX ADMIN — PREGABALIN 200 MG: 100 CAPSULE ORAL at 20:51

## 2024-12-09 NOTE — CASE MANAGEMENT/SOCIAL WORK
Continued Stay Note  [facility]     Patient Name: Erick Luong  MRN: [id number]  Today's Date: 12/9/2024    Admit Date: 12/8/2024    Plan: [facility]   Discharge Plan       Row Name 12/09/24 0927       Plan    Plan [facility]    Plan Comments Patient has received a bed offer from [facility] for 12/10/24.  Patient can dc to [facility] on 12/10.    Final Discharge Disposition Code 03 - skilled nursing facility (SNF)                   Discharge Codes    No documentation.                       SUZIE Hernandez

## 2024-12-09 NOTE — PLAN OF CARE
Goal Outcome Evaluation:           Progress: no change   Pt A/Ox4. VSS on RA. IV to LFA, IID. Accucheck, coverage given. Walking boot received and in place to L foot. Dressing changed, gume BLACK & kerlix. Tele. C/o pain, PRN meds given. Pt anxious and sad, wife in room 338. Case management consulted and franko. Plan to go to [facility] in tomorrow. Up in chair, prefers chair over bed. Safety maintained. Call light in reach.

## 2024-12-09 NOTE — PLAN OF CARE
Goal Outcome Evaluation:  Plan of Care Reviewed With: patient, spouse        Progress: no change  Outcome Evaluation: PT eval complete. Pt presented A&Ox4 in recliner with wife and dtr in room. Pt presented sad but motivated and willing to work with therapy. Pt previously able to ambulate in home with FWW. Pt required assistance for bathing and getting dressed. Pt presented with B hip flexion and knee extension 5/5; B knee flexion 4/5, RLE DF/PF 4-/5, LLE PF/DF at least 3/5. Pt able to stand with SBA and FWW. Pt able to ambulate in room with FWW and CGA. Pt presented with decreased step length, gait speed and increased weakness in BLE. Pt became short of breath with ambulation but able to recover in less than a minute when returned to seated in chair. Pt report hx of legs getting weak and giving out on him. Pt reported decreased sensation in B feet that was greater than normal. Pt would benefit from more PT to improve mobility, balance and return to previous function. PT recommends discharge to SNF to improve strength in BLE, improve balance, and to decrease the risk of future falls.    Anticipated Discharge Disposition (PT): skilled nursing facility

## 2024-12-09 NOTE — PLAN OF CARE
Goal Outcome Evaluation:  Plan of Care Reviewed With: patient        Progress: no change  Outcome Evaluation: Pt admitted from ER. NP cough, wound dressings changed, PRN pain medications given per order, pt wife also in ER. Pt very sad, depressed. BG elevated and covered with SSI. Safety maintained.
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
## 2024-12-09 NOTE — OUTREACH NOTE
COPD/PN Week 1 Survey      Flowsheet Row Responses   [facility] facility patient discharged from? [place]   Does the patient have one of the following disease processes/diagnoses(primary or secondary)? Pneumonia   Week 1 attempt successful? No   Unsuccessful attempts Attempt 1   Revoke Readmitted            LU MITCHELL - Registered Nurse
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
## 2024-12-09 NOTE — PROGRESS NOTES
"Pharmacy Dosing Service  Automatic Renal Adjustment  Pepcid    Assessment/Action/Plan:  Based on prescribing information provided by the drug , Pepcid 20 mg PO every 12 hours, has been changed to Pepcid 20 mg PO every 24 hours.      Subjective:  Erick Luong is a 69 y.o. male.     Objective:  Ht: 182.9 cm (72\"); Wt: 119 kg (263 lb 3.2 oz)  Actual body weight will be used unless the patient is 20% over Ideal Body Weight, in which case the Adjusted Ideal Body Weight will be used.     Estimated Creatinine Clearance: 41.1 mL/min (A) (by C-G formula based on SCr of 2.29 mg/dL (H)).   Creatinine   Date Value Ref Range Status   12/09/2024 2.29 (H) 0.76 - 1.27 mg/dL Final   12/08/2024 2.68 (H) 0.76 - 1.27 mg/dL Final   12/01/2024 2.35 (H) 0.76 - 1.27 mg/dL Final       Additional Factors Considered:  Patient disposition per documentation  Disease state or condition being treated    Pharmacy will continue to monitor daily and make further adjustment(s) accordingly.    MARTINA Shea, PharmD  12/09/24 09:29 CST    "

## 2024-12-09 NOTE — DISCHARGE PLACEMENT REQUEST
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 medical record.      The patient's surrogate decision maker is family, see records.      The patient was seen and examined by me on 11/18/2024 at 0 1:43 AM.     Electronically signed by Garrett Alicia MD, 11/18/24, 01:43 CST.                                    ED to Hosp-Admission (Discharged) on 11/17/2024            Routing History            Detailed Report            ROS Info          Note shared with patient  Additional Documentation    Vitals: /65 (BP Location: Left arm, Patient Position: Lying)     Pulse 64     Temp 97.9 °F (36.6 °C)     Resp 16     Ht 182.9 cm (72\")     Wt 118 kg (259 lb 3.2 oz)     SpO2 97%     BMI 35.15 kg/m²     BSA 2.38 m²          More Vitals   Flowsheets: HPI,     Acuity/Destination,     Triage Complete,     Vital Signs,     Pain,     Violence Assessment Tool,     Suicide Risk,     Sepsis Predictive Model,     Falls PA Model,     Fall Risk Screen,     ...(85 more)   Encounter Info: Billing Info,     History,     Allergies,     Detailed Report     Encounter Information    Encounter Information   Department Encounter #   11/17/2024  8:40 PM  [facility] [id number]       " yes

## 2024-12-09 NOTE — DISCHARGE PLACEMENT REQUEST
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 reviewed, repeat labs in a.m.  Any tests that were considered but not ordered: None     Decision rules/scores evaluated (example [id number], Wells, etc): [id number] score of 5     Discussed with: Dr. Bryant, patient and his daughter     Care Planning  Shared decision making: Dr. Bryant, patient and his daughter  Code status and discussions: Code per patient    Disposition  Social Determinants of Health that impact treatment or disposition: Patient is cared for 24 hours a day with his wife Joan who is unable to care for him at this time, admission for transition to skilled nursing facility in the a.m.  Estimated length of stay is 1 day.     I confirmed that the patient's advanced care plan is present, code status is documented, and a surrogate decision maker is listed in the patient's medical record.     The patient's surrogate decision maker is brother Samir Luong, unless his wife Joan Luong is available.     The patient was seen and examined by me on 12/8/2024 at 1522.    Electronically signed by SUSAN Alcantara, 12/08/24, 15:24 CST.               Electronically signed by Emeka Bryant DO at  12/08/24 1931       Physician Progress Notes (most recent note)    No notes of this type exist for this encounter.
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
## 2024-12-10 VITALS
WEIGHT: 263.2 LBS | TEMPERATURE: 97.8 F | SYSTOLIC BLOOD PRESSURE: 151 MMHG | OXYGEN SATURATION: 94 % | HEART RATE: 78 BPM | BODY MASS INDEX: 35.65 KG/M2 | DIASTOLIC BLOOD PRESSURE: 66 MMHG | HEIGHT: 72 IN | RESPIRATION RATE: 18 BRPM

## 2024-12-10 LAB
ANION GAP SERPL CALCULATED.3IONS-SCNC: 10 MMOL/L (ref 5–15)
BUN SERPL-MCNC: 59 MG/DL (ref 8–23)
BUN/CREAT SERPL: 24 (ref 7–25)
CALCIUM SPEC-SCNC: 8.4 MG/DL (ref 8.6–10.5)
CHLORIDE SERPL-SCNC: 110 MMOL/L (ref 98–107)
CO2 SERPL-SCNC: 21 MMOL/L (ref 22–29)
CREAT SERPL-MCNC: 2.46 MG/DL (ref 0.76–1.27)
EGFRCR SERPLBLD CKD-EPI 2021: 27.8 ML/MIN/1.73
GLUCOSE BLDC GLUCOMTR-MCNC: 151 MG/DL (ref 70–130)
GLUCOSE BLDC GLUCOMTR-MCNC: 276 MG/DL (ref 70–130)
GLUCOSE BLDC GLUCOMTR-MCNC: 362 MG/DL (ref 70–130)
GLUCOSE BLDC GLUCOMTR-MCNC: 384 MG/DL (ref 70–130)
GLUCOSE SERPL-MCNC: 273 MG/DL (ref 65–99)
POTASSIUM SERPL-SCNC: 4.6 MMOL/L (ref 3.5–5.2)
SODIUM SERPL-SCNC: 141 MMOL/L (ref 136–145)

## 2024-12-10 PROCEDURE — 94799 UNLISTED PULMONARY SVC/PX: CPT

## 2024-12-10 PROCEDURE — 97535 SELF CARE MNGMENT TRAINING: CPT | Performed by: OCCUPATIONAL THERAPIST

## 2024-12-10 PROCEDURE — 96376 TX/PRO/DX INJ SAME DRUG ADON: CPT

## 2024-12-10 PROCEDURE — G0378 HOSPITAL OBSERVATION PER HR: HCPCS

## 2024-12-10 PROCEDURE — 82948 REAGENT STRIP/BLOOD GLUCOSE: CPT

## 2024-12-10 PROCEDURE — 80048 BASIC METABOLIC PNL TOTAL CA: CPT

## 2024-12-10 PROCEDURE — 63710000001 INSULIN LISPRO (HUMAN) PER 5 UNITS

## 2024-12-10 PROCEDURE — 25010000002 METHYLPREDNISOLONE PER 40 MG

## 2024-12-10 RX ORDER — PREDNISONE 20 MG/1
40 TABLET ORAL DAILY
Start: 2024-12-10 | End: 2024-12-13

## 2024-12-10 RX ORDER — PREGABALIN 100 MG/1
100 CAPSULE ORAL DAILY
Qty: 3 CAPSULE | Refills: 0 | Status: CANCELLED | OUTPATIENT
Start: 2024-12-10 | End: 2024-12-13

## 2024-12-10 RX ORDER — OXYCODONE HYDROCHLORIDE 10 MG/1
10 TABLET ORAL 2 TIMES DAILY PRN
Qty: 6 TABLET | Refills: 0 | Status: SHIPPED | OUTPATIENT
Start: 2024-12-10 | End: 2024-12-13

## 2024-12-10 RX ORDER — PREGABALIN 100 MG/1
CAPSULE ORAL
Qty: 9 CAPSULE | Refills: 0 | Status: SHIPPED | OUTPATIENT
Start: 2024-12-10 | End: 2024-12-13

## 2024-12-10 RX ORDER — PREGABALIN 100 MG/1
200 CAPSULE ORAL NIGHTLY
Qty: 6 CAPSULE | Refills: 0 | Status: CANCELLED | OUTPATIENT
Start: 2024-12-10 | End: 2024-12-13

## 2024-12-10 RX ORDER — PREDNISONE 20 MG/1
20 TABLET ORAL DAILY
Start: 2024-12-14 | End: 2024-12-17

## 2024-12-10 RX ADMIN — Medication 10 ML: at 08:50

## 2024-12-10 RX ADMIN — FAMOTIDINE 20 MG: 20 TABLET, FILM COATED ORAL at 08:49

## 2024-12-10 RX ADMIN — BISACODYL 10 MG: 10 SUPPOSITORY RECTAL at 14:03

## 2024-12-10 RX ADMIN — DULOXETINE HYDROCHLORIDE 60 MG: 30 CAPSULE, DELAYED RELEASE ORAL at 08:49

## 2024-12-10 RX ADMIN — LISINOPRIL 5 MG: 10 TABLET ORAL at 08:49

## 2024-12-10 RX ADMIN — PREGABALIN 100 MG: 100 CAPSULE ORAL at 08:48

## 2024-12-10 RX ADMIN — POTASSIUM CHLORIDE 10 MEQ: 750 CAPSULE, EXTENDED RELEASE ORAL at 08:49

## 2024-12-10 RX ADMIN — DOCUSATE SODIUM 50 MG AND SENNOSIDES 8.6 MG 2 TABLET: 8.6; 5 TABLET, FILM COATED ORAL at 08:50

## 2024-12-10 RX ADMIN — TAMSULOSIN HYDROCHLORIDE 0.4 MG: 0.4 CAPSULE ORAL at 08:49

## 2024-12-10 RX ADMIN — APIXABAN 5 MG: 5 TABLET, FILM COATED ORAL at 08:49

## 2024-12-10 RX ADMIN — INSULIN LISPRO 12 UNITS: 100 INJECTION, SOLUTION INTRAVENOUS; SUBCUTANEOUS at 11:37

## 2024-12-10 RX ADMIN — INSULIN LISPRO 8 UNITS: 100 INJECTION, SOLUTION INTRAVENOUS; SUBCUTANEOUS at 08:49

## 2024-12-10 RX ADMIN — ROSUVASTATIN 10 MG: 10 TABLET, FILM COATED ORAL at 08:48

## 2024-12-10 RX ADMIN — OXYCODONE HYDROCHLORIDE AND ACETAMINOPHEN 1000 MG: 500 TABLET ORAL at 08:49

## 2024-12-10 RX ADMIN — PANTOPRAZOLE SODIUM 40 MG: 40 TABLET, DELAYED RELEASE ORAL at 08:49

## 2024-12-10 RX ADMIN — OXYCODONE HYDROCHLORIDE 10 MG: 5 TABLET ORAL at 11:37

## 2024-12-10 RX ADMIN — POLYETHYLENE GLYCOL 3350 17 G: 17 POWDER, FOR SOLUTION ORAL at 11:06

## 2024-12-10 RX ADMIN — METHYLPREDNISOLONE SODIUM SUCCINATE 40 MG: 40 INJECTION, POWDER, FOR SOLUTION INTRAMUSCULAR; INTRAVENOUS at 05:58

## 2024-12-10 RX ADMIN — BUSPIRONE HYDROCHLORIDE 10 MG: 10 TABLET ORAL at 08:56

## 2024-12-10 NOTE — PLAN OF CARE
Goal Outcome Evaluation:           Progress: no change  Outcome Evaluation: Pt sleeping but easily awakened.  Mr. Luong was able to perform bed mob Mod I but Max A to ANGEL CAM Boot & CGA to adjust sock on R foot.  SBA to ambulate to toilet & for clothing mgmt.  Pt hopeful to have BM and wished for privacy.  Alerted RN of pt on toilet and oriented pt to pull chord at toilet    Anticipated Discharge Disposition (OT): skilled nursing facility, sub acute care setting
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
## 2024-12-10 NOTE — CASE MANAGEMENT/SOCIAL WORK
Continued Stay Note  [facility]     Patient Name: Erick Luong  MRN: [id number]  Today's Date: 12/10/2024    Admit Date: 12/8/2024    Plan: [facility]   Discharge Plan       Row Name 12/10/24 0840       Plan    Plan [facility]    Patient/Family in Agreement with Plan yes    Final Discharge Disposition Code 03 - skilled nursing facility (SNF)    Final Note Pt is being discharged to [facility] today, SNF level care and [place] from facility aware.  This SW faxed d/c summary to them at [phone]. Report to be called to [phone]. Spoke with pt's wife that is admitted as well to inform of d/c today. They do not have a vehicle at present/pt does not qualify for ambulance. This SW to provided voucher to cover [employer] for transport there paid for via Patient Assistance Program.                   Discharge Codes    No documentation.                 Expected Discharge Date and Time       Expected Discharge Date Expected Discharge Time    Dec 10, 2024               [place]AR [place]
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
## 2024-12-10 NOTE — PLAN OF CARE
Goal Outcome Evaluation:              Outcome Evaluation: Pt D/C to [facility] today via ride from [place]. Belongings sent with pt. Went over AVS with pt. Changed dressing to Left Foot per verbal order from [facility], with [employer] gauze and kerlix. Pt had shower prior to d/c. Pt had BM this afternoon. IV removed. Tele removed. Blood glucose monitored and treated per orders this shift. C/o pain, prn meds given.

## 2024-12-10 NOTE — PLAN OF CARE
Goal Outcome Evaluation:  Plan of Care Reviewed With: patient        Progress: no change  Outcome Evaluation: Pt A/Ox4, quiet and withdrawn. Pt up in chair until nearly midnight. Currently sleeping with no c/o pain. Safety maintained. Call light in reach.
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
## 2025-01-06 ENCOUNTER — TELEPHONE (OUTPATIENT)
Dept: CARDIOLOGY | Facility: CLINIC | Age: 70
End: 2025-01-06
Payer: MEDICARE

## 2025-01-06 ENCOUNTER — APPOINTMENT (OUTPATIENT)
Dept: GENERAL RADIOLOGY | Facility: HOSPITAL | Age: 70
DRG: 264 | End: 2025-01-06
Payer: MEDICARE

## 2025-01-06 ENCOUNTER — HOSPITAL ENCOUNTER (INPATIENT)
Facility: HOSPITAL | Age: 70
LOS: 5 days | Discharge: HOME | DRG: 264 | End: 2025-01-13
Attending: FAMILY MEDICINE | Admitting: INTERNAL MEDICINE
Payer: MEDICARE

## 2025-01-06 DIAGNOSIS — J96.01 ACUTE RESPIRATORY FAILURE WITH HYPOXIA: ICD-10-CM

## 2025-01-06 DIAGNOSIS — J96.00 ACUTE RESPIRATORY FAILURE, UNSPECIFIED WHETHER WITH HYPOXIA OR HYPERCAPNIA: ICD-10-CM

## 2025-01-06 DIAGNOSIS — J81.0 ACUTE PULMONARY EDEMA: ICD-10-CM

## 2025-01-06 DIAGNOSIS — I50.43 CHF (CONGESTIVE HEART FAILURE), NYHA CLASS I, ACUTE ON CHRONIC, COMBINED: ICD-10-CM

## 2025-01-06 DIAGNOSIS — I48.91 ATRIAL FIBRILLATION WITH RAPID VENTRICULAR RESPONSE: Primary | ICD-10-CM

## 2025-01-06 DIAGNOSIS — N18.4 CHRONIC RENAL FAILURE (CRF), STAGE 4 (SEVERE): ICD-10-CM

## 2025-01-06 DIAGNOSIS — Z74.09 IMPAIRED MOBILITY: ICD-10-CM

## 2025-01-06 PROBLEM — J96.91 RESPIRATORY FAILURE WITH HYPOXIA: Status: ACTIVE | Noted: 2025-01-06

## 2025-01-06 LAB
ALBUMIN SERPL-MCNC: 3.3 G/DL (ref 3.5–5.2)
ALBUMIN/GLOB SERPL: 0.9 G/DL
ALP SERPL-CCNC: 166 U/L (ref 39–117)
ALT SERPL W P-5'-P-CCNC: 27 U/L (ref 1–41)
ANION GAP SERPL CALCULATED.3IONS-SCNC: 13 MMOL/L (ref 5–15)
ANION GAP SERPL CALCULATED.3IONS-SCNC: 15 MMOL/L (ref 5–15)
APTT PPP: 36 SECONDS (ref 24.5–36)
ARTERIAL PATENCY WRIST A: POSITIVE
AST SERPL-CCNC: 21 U/L (ref 1–40)
ATMOSPHERIC PRESS: 759 MMHG
BASE EXCESS BLDA CALC-SCNC: -3.9 MMOL/L (ref 0–2)
BASOPHILS # BLD AUTO: 0.03 10*3/MM3 (ref 0–0.2)
BASOPHILS # BLD AUTO: 0.03 10*3/MM3 (ref 0–0.2)
BASOPHILS NFR BLD AUTO: 0.4 % (ref 0–1.5)
BASOPHILS NFR BLD AUTO: 0.4 % (ref 0–1.5)
BDY SITE: ABNORMAL
BILIRUB SERPL-MCNC: 0.3 MG/DL (ref 0–1.2)
BODY TEMPERATURE: 37
BUN SERPL-MCNC: 64 MG/DL (ref 8–23)
BUN SERPL-MCNC: 66 MG/DL (ref 8–23)
BUN/CREAT SERPL: 20.9 (ref 7–25)
BUN/CREAT SERPL: 21.8 (ref 7–25)
CALCIUM SPEC-SCNC: 8.7 MG/DL (ref 8.6–10.5)
CALCIUM SPEC-SCNC: 8.8 MG/DL (ref 8.6–10.5)
CHLORIDE SERPL-SCNC: 105 MMOL/L (ref 98–107)
CHLORIDE SERPL-SCNC: 106 MMOL/L (ref 98–107)
CO2 SERPL-SCNC: 20 MMOL/L (ref 22–29)
CO2 SERPL-SCNC: 21 MMOL/L (ref 22–29)
COHGB MFR BLD: 1.3 % (ref 0–5)
CREAT SERPL-MCNC: 3.03 MG/DL (ref 0.76–1.27)
CREAT SERPL-MCNC: 3.06 MG/DL (ref 0.76–1.27)
D-LACTATE SERPL-SCNC: 1 MMOL/L (ref 0.5–2)
DEPRECATED RDW RBC AUTO: 48.3 FL (ref 37–54)
DEPRECATED RDW RBC AUTO: 48.5 FL (ref 37–54)
EGFRCR SERPLBLD CKD-EPI 2021: 21.4 ML/MIN/1.73
EGFRCR SERPLBLD CKD-EPI 2021: 21.7 ML/MIN/1.73
EOSINOPHIL # BLD AUTO: 0.45 10*3/MM3 (ref 0–0.4)
EOSINOPHIL # BLD AUTO: 0.47 10*3/MM3 (ref 0–0.4)
EOSINOPHIL NFR BLD AUTO: 5.9 % (ref 0.3–6.2)
EOSINOPHIL NFR BLD AUTO: 6 % (ref 0.3–6.2)
ERYTHROCYTE [DISTWIDTH] IN BLOOD BY AUTOMATED COUNT: 14.6 % (ref 12.3–15.4)
ERYTHROCYTE [DISTWIDTH] IN BLOOD BY AUTOMATED COUNT: 14.6 % (ref 12.3–15.4)
GEN 5 1HR TROPONIN T REFLEX: 57 NG/L
GLOBULIN UR ELPH-MCNC: 3.5 GM/DL
GLUCOSE BLDC GLUCOMTR-MCNC: 361 MG/DL (ref 70–130)
GLUCOSE SERPL-MCNC: 276 MG/DL (ref 65–99)
GLUCOSE SERPL-MCNC: 289 MG/DL (ref 65–99)
HCO3 BLDA-SCNC: 20.6 MMOL/L (ref 20–26)
HCT VFR BLD AUTO: 25.3 % (ref 37.5–51)
HCT VFR BLD AUTO: 26.4 % (ref 37.5–51)
HCT VFR BLD CALC: 25.8 % (ref 38–51)
HGB BLD-MCNC: 8.1 G/DL (ref 13–17.7)
HGB BLD-MCNC: 8.4 G/DL (ref 13–17.7)
HGB BLDA-MCNC: 8.4 G/DL (ref 14–18)
IMM GRANULOCYTES # BLD AUTO: 0.04 10*3/MM3 (ref 0–0.05)
IMM GRANULOCYTES # BLD AUTO: 0.04 10*3/MM3 (ref 0–0.05)
IMM GRANULOCYTES NFR BLD AUTO: 0.5 % (ref 0–0.5)
IMM GRANULOCYTES NFR BLD AUTO: 0.5 % (ref 0–0.5)
INR PPP: 1.18 (ref 0.91–1.09)
LYMPHOCYTES # BLD AUTO: 0.58 10*3/MM3 (ref 0.7–3.1)
LYMPHOCYTES # BLD AUTO: 0.78 10*3/MM3 (ref 0.7–3.1)
LYMPHOCYTES NFR BLD AUTO: 10 % (ref 19.6–45.3)
LYMPHOCYTES NFR BLD AUTO: 7.6 % (ref 19.6–45.3)
Lab: ABNORMAL
MAGNESIUM SERPL-MCNC: 2.4 MG/DL (ref 1.6–2.4)
MCH RBC QN AUTO: 28.8 PG (ref 26.6–33)
MCH RBC QN AUTO: 28.9 PG (ref 26.6–33)
MCHC RBC AUTO-ENTMCNC: 31.8 G/DL (ref 31.5–35.7)
MCHC RBC AUTO-ENTMCNC: 32 G/DL (ref 31.5–35.7)
MCV RBC AUTO: 90.4 FL (ref 79–97)
MCV RBC AUTO: 90.4 FL (ref 79–97)
METHGB BLD QL: 0.5 % (ref 0–3)
MODALITY: ABNORMAL
MONOCYTES # BLD AUTO: 0.68 10*3/MM3 (ref 0.1–0.9)
MONOCYTES # BLD AUTO: 0.69 10*3/MM3 (ref 0.1–0.9)
MONOCYTES NFR BLD AUTO: 8.8 % (ref 5–12)
MONOCYTES NFR BLD AUTO: 8.9 % (ref 5–12)
NEUTROPHILS NFR BLD AUTO: 5.82 10*3/MM3 (ref 1.7–7)
NEUTROPHILS NFR BLD AUTO: 5.88 10*3/MM3 (ref 1.7–7)
NEUTROPHILS NFR BLD AUTO: 74.3 % (ref 42.7–76)
NEUTROPHILS NFR BLD AUTO: 76.7 % (ref 42.7–76)
NRBC BLD AUTO-RTO: 0 /100 WBC (ref 0–0.2)
NRBC BLD AUTO-RTO: 0 /100 WBC (ref 0–0.2)
NT-PROBNP SERPL-MCNC: 5180 PG/ML (ref 0–900)
OXYHGB MFR BLDV: 92.3 % (ref 94–99)
PCO2 BLDA: 34.3 MM HG (ref 35–45)
PCO2 TEMP ADJ BLD: 34.3 MM HG (ref 35–45)
PH BLDA: 7.39 PH UNITS (ref 7.35–7.45)
PH, TEMP CORRECTED: 7.39 PH UNITS (ref 7.35–7.45)
PLATELET # BLD AUTO: 229 10*3/MM3 (ref 140–450)
PLATELET # BLD AUTO: 239 10*3/MM3 (ref 140–450)
PMV BLD AUTO: 11.3 FL (ref 6–12)
PMV BLD AUTO: 11.3 FL (ref 6–12)
PO2 BLDA: 67.2 MM HG (ref 83–108)
PO2 TEMP ADJ BLD: 67.2 MM HG (ref 83–108)
POTASSIUM BLDA-SCNC: 4.7 MMOL/L (ref 3.5–5.2)
POTASSIUM SERPL-SCNC: 5.1 MMOL/L (ref 3.5–5.2)
POTASSIUM SERPL-SCNC: 5.2 MMOL/L (ref 3.5–5.2)
PROT SERPL-MCNC: 6.8 G/DL (ref 6–8.5)
PROTHROMBIN TIME: 15.6 SECONDS (ref 11.8–14.8)
RBC # BLD AUTO: 2.8 10*6/MM3 (ref 4.14–5.8)
RBC # BLD AUTO: 2.92 10*6/MM3 (ref 4.14–5.8)
SAO2 % BLDCOA: 93.9 % (ref 94–99)
SODIUM BLDA-SCNC: 142 MMOL/L (ref 136–145)
SODIUM SERPL-SCNC: 140 MMOL/L (ref 136–145)
SODIUM SERPL-SCNC: 140 MMOL/L (ref 136–145)
TROPONIN T % DELTA: 0 %
TROPONIN T NUMERIC DELTA: 0 NG/L
TROPONIN T SERPL HS-MCNC: 57 NG/L
VENTILATOR MODE: ABNORMAL
WBC NRBC COR # BLD AUTO: 7.66 10*3/MM3 (ref 3.4–10.8)
WBC NRBC COR # BLD AUTO: 7.83 10*3/MM3 (ref 3.4–10.8)

## 2025-01-06 PROCEDURE — 25010000002 BUMETANIDE PER 0.5 MG: Performed by: FAMILY MEDICINE

## 2025-01-06 PROCEDURE — 93005 ELECTROCARDIOGRAM TRACING: CPT | Performed by: FAMILY MEDICINE

## 2025-01-06 PROCEDURE — 99285 EMERGENCY DEPT VISIT HI MDM: CPT

## 2025-01-06 PROCEDURE — 82805 BLOOD GASES W/O2 SATURATION: CPT

## 2025-01-06 PROCEDURE — 83050 HGB METHEMOGLOBIN QUAN: CPT

## 2025-01-06 PROCEDURE — 36600 WITHDRAWAL OF ARTERIAL BLOOD: CPT

## 2025-01-06 PROCEDURE — 83880 ASSAY OF NATRIURETIC PEPTIDE: CPT | Performed by: FAMILY MEDICINE

## 2025-01-06 PROCEDURE — 71045 X-RAY EXAM CHEST 1 VIEW: CPT

## 2025-01-06 PROCEDURE — 63710000001 INSULIN LISPRO (HUMAN) PER 5 UNITS: Performed by: HOSPITALIST

## 2025-01-06 PROCEDURE — 80053 COMPREHEN METABOLIC PANEL: CPT | Performed by: FAMILY MEDICINE

## 2025-01-06 PROCEDURE — G0378 HOSPITAL OBSERVATION PER HR: HCPCS

## 2025-01-06 PROCEDURE — 85730 THROMBOPLASTIN TIME PARTIAL: CPT | Performed by: FAMILY MEDICINE

## 2025-01-06 PROCEDURE — 85025 COMPLETE CBC W/AUTO DIFF WBC: CPT | Performed by: FAMILY MEDICINE

## 2025-01-06 PROCEDURE — 85610 PROTHROMBIN TIME: CPT | Performed by: FAMILY MEDICINE

## 2025-01-06 PROCEDURE — 36415 COLL VENOUS BLD VENIPUNCTURE: CPT

## 2025-01-06 PROCEDURE — 83605 ASSAY OF LACTIC ACID: CPT | Performed by: FAMILY MEDICINE

## 2025-01-06 PROCEDURE — 25010000002 ENOXAPARIN PER 10 MG: Performed by: FAMILY MEDICINE

## 2025-01-06 PROCEDURE — 82375 ASSAY CARBOXYHB QUANT: CPT

## 2025-01-06 PROCEDURE — 82948 REAGENT STRIP/BLOOD GLUCOSE: CPT

## 2025-01-06 PROCEDURE — 85025 COMPLETE CBC W/AUTO DIFF WBC: CPT | Performed by: HOSPITALIST

## 2025-01-06 PROCEDURE — 83735 ASSAY OF MAGNESIUM: CPT | Performed by: FAMILY MEDICINE

## 2025-01-06 PROCEDURE — 25010000002 MORPHINE PER 10 MG: Performed by: HOSPITALIST

## 2025-01-06 PROCEDURE — 93010 ELECTROCARDIOGRAM REPORT: CPT | Performed by: INTERNAL MEDICINE

## 2025-01-06 PROCEDURE — 84484 ASSAY OF TROPONIN QUANT: CPT | Performed by: FAMILY MEDICINE

## 2025-01-06 PROCEDURE — 87040 BLOOD CULTURE FOR BACTERIA: CPT | Performed by: FAMILY MEDICINE

## 2025-01-06 RX ORDER — INSULIN LISPRO 100 [IU]/ML
2-9 INJECTION, SOLUTION INTRAVENOUS; SUBCUTANEOUS
Status: DISCONTINUED | OUTPATIENT
Start: 2025-01-06 | End: 2025-01-13 | Stop reason: HOSPADM

## 2025-01-06 RX ORDER — BISACODYL 10 MG
10 SUPPOSITORY, RECTAL RECTAL DAILY PRN
Status: DISCONTINUED | OUTPATIENT
Start: 2025-01-06 | End: 2025-01-13 | Stop reason: HOSPADM

## 2025-01-06 RX ORDER — SODIUM CHLORIDE 0.9 % (FLUSH) 0.9 %
10 SYRINGE (ML) INJECTION AS NEEDED
Status: DISCONTINUED | OUTPATIENT
Start: 2025-01-06 | End: 2025-01-09 | Stop reason: SDUPTHER

## 2025-01-06 RX ORDER — ACETAMINOPHEN 650 MG/1
650 SUPPOSITORY RECTAL EVERY 4 HOURS PRN
Status: DISCONTINUED | OUTPATIENT
Start: 2025-01-06 | End: 2025-01-13 | Stop reason: HOSPADM

## 2025-01-06 RX ORDER — DILTIAZEM HYDROCHLORIDE 5 MG/ML
10 INJECTION INTRAVENOUS ONCE
Status: COMPLETED | OUTPATIENT
Start: 2025-01-06 | End: 2025-01-06

## 2025-01-06 RX ORDER — IBUPROFEN 600 MG/1
1 TABLET ORAL
Status: DISCONTINUED | OUTPATIENT
Start: 2025-01-06 | End: 2025-01-13 | Stop reason: HOSPADM

## 2025-01-06 RX ORDER — NITROGLYCERIN 0.4 MG/1
0.4 TABLET SUBLINGUAL
Status: DISCONTINUED | OUTPATIENT
Start: 2025-01-06 | End: 2025-01-13 | Stop reason: HOSPADM

## 2025-01-06 RX ORDER — ACETAMINOPHEN 160 MG/5ML
650 SOLUTION ORAL EVERY 4 HOURS PRN
Status: DISCONTINUED | OUTPATIENT
Start: 2025-01-06 | End: 2025-01-13 | Stop reason: HOSPADM

## 2025-01-06 RX ORDER — NALOXONE HCL 0.4 MG/ML
0.4 VIAL (ML) INJECTION
Status: DISCONTINUED | OUTPATIENT
Start: 2025-01-06 | End: 2025-01-13 | Stop reason: HOSPADM

## 2025-01-06 RX ORDER — IPRATROPIUM BROMIDE AND ALBUTEROL SULFATE 2.5; .5 MG/3ML; MG/3ML
3 SOLUTION RESPIRATORY (INHALATION) 4 TIMES DAILY PRN
Status: DISCONTINUED | OUTPATIENT
Start: 2025-01-06 | End: 2025-01-13 | Stop reason: HOSPADM

## 2025-01-06 RX ORDER — PANTOPRAZOLE SODIUM 40 MG/1
40 TABLET, DELAYED RELEASE ORAL 2 TIMES DAILY
Status: DISCONTINUED | OUTPATIENT
Start: 2025-01-06 | End: 2025-01-13 | Stop reason: HOSPADM

## 2025-01-06 RX ORDER — BUMETANIDE 0.25 MG/ML
1 INJECTION, SOLUTION INTRAMUSCULAR; INTRAVENOUS EVERY 12 HOURS
Status: DISCONTINUED | OUTPATIENT
Start: 2025-01-07 | End: 2025-01-12

## 2025-01-06 RX ORDER — PSEUDOEPHEDRINE HCL 30 MG
100 TABLET ORAL DAILY
COMMUNITY
Start: 2025-01-06

## 2025-01-06 RX ORDER — AMOXICILLIN 250 MG
2 CAPSULE ORAL 2 TIMES DAILY PRN
Status: DISCONTINUED | OUTPATIENT
Start: 2025-01-06 | End: 2025-01-13 | Stop reason: HOSPADM

## 2025-01-06 RX ORDER — POLYETHYLENE GLYCOL 3350 17 G/17G
17 POWDER, FOR SOLUTION ORAL DAILY PRN
Status: DISCONTINUED | OUTPATIENT
Start: 2025-01-06 | End: 2025-01-13 | Stop reason: HOSPADM

## 2025-01-06 RX ORDER — SODIUM CHLORIDE 0.9 % (FLUSH) 0.9 %
10 SYRINGE (ML) INJECTION AS NEEDED
Status: DISCONTINUED | OUTPATIENT
Start: 2025-01-06 | End: 2025-01-13 | Stop reason: HOSPADM

## 2025-01-06 RX ORDER — SODIUM CHLORIDE 0.9 % (FLUSH) 0.9 %
10 SYRINGE (ML) INJECTION EVERY 12 HOURS SCHEDULED
Status: DISCONTINUED | OUTPATIENT
Start: 2025-01-06 | End: 2025-01-13 | Stop reason: HOSPADM

## 2025-01-06 RX ORDER — DEXTROSE MONOHYDRATE 25 G/50ML
25 INJECTION, SOLUTION INTRAVENOUS
Status: DISCONTINUED | OUTPATIENT
Start: 2025-01-06 | End: 2025-01-13 | Stop reason: HOSPADM

## 2025-01-06 RX ORDER — CLONIDINE HYDROCHLORIDE 0.1 MG/1
TABLET ORAL
Status: ON HOLD | COMMUNITY
End: 2025-01-07

## 2025-01-06 RX ORDER — DONEPEZIL HYDROCHLORIDE 10 MG/1
10 TABLET, FILM COATED ORAL NIGHTLY
Status: DISCONTINUED | OUTPATIENT
Start: 2025-01-06 | End: 2025-01-13 | Stop reason: HOSPADM

## 2025-01-06 RX ORDER — NICOTINE POLACRILEX 4 MG
15 LOZENGE BUCCAL
Status: DISCONTINUED | OUTPATIENT
Start: 2025-01-06 | End: 2025-01-13 | Stop reason: HOSPADM

## 2025-01-06 RX ORDER — METOPROLOL SUCCINATE 25 MG/1
12.5 TABLET, EXTENDED RELEASE ORAL ONCE
Status: COMPLETED | OUTPATIENT
Start: 2025-01-06 | End: 2025-01-06

## 2025-01-06 RX ORDER — BUMETANIDE 2 MG/1
2 TABLET ORAL 2 TIMES DAILY
Status: ON HOLD | COMMUNITY
Start: 2025-01-06 | End: 2025-01-13

## 2025-01-06 RX ORDER — DILTIAZEM HYDROCHLORIDE 180 MG/1
180 CAPSULE, COATED, EXTENDED RELEASE ORAL
Status: DISCONTINUED | OUTPATIENT
Start: 2025-01-06 | End: 2025-01-08

## 2025-01-06 RX ORDER — ONDANSETRON 2 MG/ML
4 INJECTION INTRAMUSCULAR; INTRAVENOUS EVERY 6 HOURS PRN
Status: DISCONTINUED | OUTPATIENT
Start: 2025-01-06 | End: 2025-01-13 | Stop reason: HOSPADM

## 2025-01-06 RX ORDER — ACETAMINOPHEN 325 MG/1
650 TABLET ORAL EVERY 4 HOURS PRN
Status: DISCONTINUED | OUTPATIENT
Start: 2025-01-06 | End: 2025-01-13 | Stop reason: HOSPADM

## 2025-01-06 RX ORDER — MORPHINE SULFATE 2 MG/ML
2 INJECTION, SOLUTION INTRAMUSCULAR; INTRAVENOUS EVERY 4 HOURS PRN
Status: DISPENSED | OUTPATIENT
Start: 2025-01-06 | End: 2025-01-11

## 2025-01-06 RX ORDER — ENOXAPARIN SODIUM 150 MG/ML
1 INJECTION SUBCUTANEOUS ONCE
Status: COMPLETED | OUTPATIENT
Start: 2025-01-06 | End: 2025-01-06

## 2025-01-06 RX ORDER — SODIUM CHLORIDE 9 MG/ML
40 INJECTION, SOLUTION INTRAVENOUS AS NEEDED
Status: DISCONTINUED | OUTPATIENT
Start: 2025-01-06 | End: 2025-01-13 | Stop reason: HOSPADM

## 2025-01-06 RX ORDER — BUMETANIDE 0.25 MG/ML
2 INJECTION, SOLUTION INTRAMUSCULAR; INTRAVENOUS ONCE
Status: COMPLETED | OUTPATIENT
Start: 2025-01-06 | End: 2025-01-06

## 2025-01-06 RX ORDER — BUSPIRONE HYDROCHLORIDE 10 MG/1
10 TABLET ORAL 3 TIMES DAILY PRN
Status: DISCONTINUED | OUTPATIENT
Start: 2025-01-06 | End: 2025-01-13 | Stop reason: HOSPADM

## 2025-01-06 RX ORDER — BISACODYL 5 MG/1
5 TABLET, DELAYED RELEASE ORAL DAILY PRN
Status: DISCONTINUED | OUTPATIENT
Start: 2025-01-06 | End: 2025-01-13 | Stop reason: HOSPADM

## 2025-01-06 RX ADMIN — DILTIAZEM HYDROCHLORIDE 10 MG: 5 INJECTION INTRAVENOUS at 16:53

## 2025-01-06 RX ADMIN — DILTIAZEM HYDROCHLORIDE 180 MG: 180 CAPSULE, EXTENDED RELEASE ORAL at 22:07

## 2025-01-06 RX ADMIN — DONEPEZIL HYDROCHLORIDE 10 MG: 10 TABLET, FILM COATED ORAL at 22:09

## 2025-01-06 RX ADMIN — MORPHINE SULFATE 2 MG: 2 INJECTION, SOLUTION INTRAMUSCULAR; INTRAVENOUS at 22:08

## 2025-01-06 RX ADMIN — ENOXAPARIN SODIUM 120 MG: 120 INJECTION SUBCUTANEOUS at 17:31

## 2025-01-06 RX ADMIN — METOPROLOL SUCCINATE 12.5 MG: 25 TABLET, EXTENDED RELEASE ORAL at 17:32

## 2025-01-06 RX ADMIN — INSULIN LISPRO 7 UNITS: 100 INJECTION, SOLUTION INTRAVENOUS; SUBCUTANEOUS at 22:07

## 2025-01-06 RX ADMIN — DOCUSATE SODIUM 50 MG AND SENNOSIDES 8.6 MG 2 TABLET: 8.6; 5 TABLET, FILM COATED ORAL at 22:07

## 2025-01-06 RX ADMIN — PANTOPRAZOLE SODIUM 40 MG: 40 TABLET, DELAYED RELEASE ORAL at 22:09

## 2025-01-06 RX ADMIN — Medication 10 ML: at 22:10

## 2025-01-06 RX ADMIN — APIXABAN 5 MG: 5 TABLET, FILM COATED ORAL at 22:09

## 2025-01-06 RX ADMIN — BUMETANIDE 2 MG: 0.25 INJECTION INTRAMUSCULAR; INTRAVENOUS at 17:28
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
## 2025-01-06 NOTE — TELEPHONE ENCOUNTER
Caller: CIARA    Relationship to patient: Provider DR ORTIZ PCP OFFICE    Best call back number: [phone] IS PT'S NUMBER     Patient is needing: PT WAS RECENTLY IN ED, CALLING TO SET UP HOSP FU. NO INSTRUCTION IN ED NOTES FOR HUB TO SCHEDULE. PLS CALL PT AND ADVISE.

## 2025-01-07 LAB
ANION GAP SERPL CALCULATED.3IONS-SCNC: 14 MMOL/L (ref 5–15)
BASOPHILS # BLD AUTO: 0.08 10*3/MM3 (ref 0–0.2)
BASOPHILS NFR BLD AUTO: 1.1 % (ref 0–1.5)
BUN SERPL-MCNC: 63 MG/DL (ref 8–23)
BUN/CREAT SERPL: 22.3 (ref 7–25)
CALCIUM SPEC-SCNC: 8.8 MG/DL (ref 8.6–10.5)
CHLORIDE SERPL-SCNC: 104 MMOL/L (ref 98–107)
CO2 SERPL-SCNC: 21 MMOL/L (ref 22–29)
CREAT SERPL-MCNC: 2.82 MG/DL (ref 0.76–1.27)
DEPRECATED RDW RBC AUTO: 47.8 FL (ref 37–54)
EGFRCR SERPLBLD CKD-EPI 2021: 23.6 ML/MIN/1.73
EOSINOPHIL # BLD AUTO: 0.86 10*3/MM3 (ref 0–0.4)
EOSINOPHIL NFR BLD AUTO: 12 % (ref 0.3–6.2)
ERYTHROCYTE [DISTWIDTH] IN BLOOD BY AUTOMATED COUNT: 14.6 % (ref 12.3–15.4)
GLUCOSE BLDC GLUCOMTR-MCNC: 164 MG/DL (ref 70–130)
GLUCOSE BLDC GLUCOMTR-MCNC: 169 MG/DL (ref 70–130)
GLUCOSE BLDC GLUCOMTR-MCNC: 283 MG/DL (ref 70–130)
GLUCOSE BLDC GLUCOMTR-MCNC: 362 MG/DL (ref 70–130)
GLUCOSE SERPL-MCNC: 118 MG/DL (ref 65–99)
HCT VFR BLD AUTO: 26.7 % (ref 37.5–51)
HGB BLD-MCNC: 8.4 G/DL (ref 13–17.7)
IMM GRANULOCYTES # BLD AUTO: 0.04 10*3/MM3 (ref 0–0.05)
IMM GRANULOCYTES NFR BLD AUTO: 0.6 % (ref 0–0.5)
LYMPHOCYTES # BLD AUTO: 0.95 10*3/MM3 (ref 0.7–3.1)
LYMPHOCYTES NFR BLD AUTO: 13.3 % (ref 19.6–45.3)
MCH RBC QN AUTO: 28.5 PG (ref 26.6–33)
MCHC RBC AUTO-ENTMCNC: 31.5 G/DL (ref 31.5–35.7)
MCV RBC AUTO: 90.5 FL (ref 79–97)
MONOCYTES # BLD AUTO: 0.82 10*3/MM3 (ref 0.1–0.9)
MONOCYTES NFR BLD AUTO: 11.5 % (ref 5–12)
NEUTROPHILS NFR BLD AUTO: 4.39 10*3/MM3 (ref 1.7–7)
NEUTROPHILS NFR BLD AUTO: 61.5 % (ref 42.7–76)
NRBC BLD AUTO-RTO: 0 /100 WBC (ref 0–0.2)
PLATELET # BLD AUTO: 250 10*3/MM3 (ref 140–450)
PMV BLD AUTO: 11.8 FL (ref 6–12)
POTASSIUM SERPL-SCNC: 4.6 MMOL/L (ref 3.5–5.2)
PTH-INTACT SERPL-MCNC: 111.3 PG/ML (ref 15–65)
QT INTERVAL: 334 MS
QTC INTERVAL: 439 MS
RBC # BLD AUTO: 2.95 10*6/MM3 (ref 4.14–5.8)
SODIUM SERPL-SCNC: 139 MMOL/L (ref 136–145)
WBC NRBC COR # BLD AUTO: 7.14 10*3/MM3 (ref 3.4–10.8)

## 2025-01-07 PROCEDURE — 82948 REAGENT STRIP/BLOOD GLUCOSE: CPT

## 2025-01-07 PROCEDURE — 25010000002 BUMETANIDE PER 0.5 MG: Performed by: HOSPITALIST

## 2025-01-07 PROCEDURE — 25010000002 MORPHINE PER 10 MG: Performed by: HOSPITALIST

## 2025-01-07 PROCEDURE — 99222 1ST HOSP IP/OBS MODERATE 55: CPT | Performed by: NURSE PRACTITIONER

## 2025-01-07 PROCEDURE — 63710000001 INSULIN LISPRO (HUMAN) PER 5 UNITS: Performed by: HOSPITALIST

## 2025-01-07 PROCEDURE — 85025 COMPLETE CBC W/AUTO DIFF WBC: CPT | Performed by: HOSPITALIST

## 2025-01-07 PROCEDURE — G0378 HOSPITAL OBSERVATION PER HR: HCPCS

## 2025-01-07 PROCEDURE — 83970 ASSAY OF PARATHORMONE: CPT | Performed by: INTERNAL MEDICINE

## 2025-01-07 PROCEDURE — 36415 COLL VENOUS BLD VENIPUNCTURE: CPT | Performed by: HOSPITALIST

## 2025-01-07 PROCEDURE — 80048 BASIC METABOLIC PNL TOTAL CA: CPT | Performed by: HOSPITALIST

## 2025-01-07 PROCEDURE — 93005 ELECTROCARDIOGRAM TRACING: CPT | Performed by: HOSPITALIST

## 2025-01-07 PROCEDURE — 93010 ELECTROCARDIOGRAM REPORT: CPT | Performed by: EMERGENCY MEDICINE

## 2025-01-07 RX ORDER — SERTRALINE HYDROCHLORIDE 25 MG/1
25 TABLET, FILM COATED ORAL DAILY
COMMUNITY

## 2025-01-07 RX ORDER — OXYCODONE HYDROCHLORIDE 5 MG/1
10 TABLET ORAL EVERY 12 HOURS PRN
Status: DISPENSED | OUTPATIENT
Start: 2025-01-07 | End: 2025-01-12

## 2025-01-07 RX ORDER — PREGABALIN 100 MG/1
100 CAPSULE ORAL 3 TIMES DAILY
COMMUNITY

## 2025-01-07 RX ORDER — OXYCODONE HYDROCHLORIDE 10 MG/1
10 TABLET ORAL EVERY 12 HOURS PRN
COMMUNITY

## 2025-01-07 RX ADMIN — PANTOPRAZOLE SODIUM 40 MG: 40 TABLET, DELAYED RELEASE ORAL at 21:30

## 2025-01-07 RX ADMIN — MORPHINE SULFATE 2 MG: 2 INJECTION, SOLUTION INTRAMUSCULAR; INTRAVENOUS at 02:07

## 2025-01-07 RX ADMIN — NITROGLYCERIN 0.4 MG: 0.4 TABLET SUBLINGUAL at 23:47

## 2025-01-07 RX ADMIN — ACETAMINOPHEN 650 MG: 325 TABLET ORAL at 23:50

## 2025-01-07 RX ADMIN — Medication 10 ML: at 21:30

## 2025-01-07 RX ADMIN — DONEPEZIL HYDROCHLORIDE 10 MG: 10 TABLET, FILM COATED ORAL at 21:30

## 2025-01-07 RX ADMIN — DILTIAZEM HYDROCHLORIDE 180 MG: 180 CAPSULE, EXTENDED RELEASE ORAL at 08:40

## 2025-01-07 RX ADMIN — Medication 10 ML: at 08:41

## 2025-01-07 RX ADMIN — OXYCODONE HYDROCHLORIDE 10 MG: 5 TABLET ORAL at 16:17

## 2025-01-07 RX ADMIN — NITROGLYCERIN 0.4 MG: 0.4 TABLET SUBLINGUAL at 23:53

## 2025-01-07 RX ADMIN — NITROGLYCERIN 0.4 MG: 0.4 TABLET SUBLINGUAL at 23:41

## 2025-01-07 RX ADMIN — MORPHINE SULFATE 2 MG: 2 INJECTION, SOLUTION INTRAMUSCULAR; INTRAVENOUS at 21:43

## 2025-01-07 RX ADMIN — BUSPIRONE HYDROCHLORIDE 10 MG: 10 TABLET ORAL at 00:44

## 2025-01-07 RX ADMIN — ACETAMINOPHEN 650 MG: 325 TABLET ORAL at 00:44

## 2025-01-07 RX ADMIN — APIXABAN 5 MG: 5 TABLET, FILM COATED ORAL at 08:40

## 2025-01-07 RX ADMIN — INSULIN LISPRO 8 UNITS: 100 INJECTION, SOLUTION INTRAVENOUS; SUBCUTANEOUS at 17:06

## 2025-01-07 RX ADMIN — PANTOPRAZOLE SODIUM 40 MG: 40 TABLET, DELAYED RELEASE ORAL at 08:40

## 2025-01-07 RX ADMIN — APIXABAN 5 MG: 5 TABLET, FILM COATED ORAL at 21:30

## 2025-01-07 RX ADMIN — INSULIN LISPRO 6 UNITS: 100 INJECTION, SOLUTION INTRAVENOUS; SUBCUTANEOUS at 21:31

## 2025-01-07 RX ADMIN — BUMETANIDE 1 MG: 0.25 INJECTION INTRAMUSCULAR; INTRAVENOUS at 17:06

## 2025-01-07 RX ADMIN — BUMETANIDE 1 MG: 0.25 INJECTION INTRAMUSCULAR; INTRAVENOUS at 05:56

## 2025-01-07 NOTE — TELEPHONE ENCOUNTER
Patient is still in the hospital. Called and left him a voicemail letting him know that we will call him and schedule an appointment with our office if he needs one after he gets out of the hospital.

## 2025-01-07 NOTE — CONSULTS
Nephrology ([facility]) Consult Note      Patient:  Erick Luong  YOB: 1955  Date of Service: 1/7/2025  MRN: [id number]   Acct: [id number]   Primary Care Physician: Del Shetty MD  Advance Directive:   Code Status and Medical Interventions: CPR (Attempt to Resuscitate); Full Support   Ordered at: 01/06/25 1800     Code Status (Patient has no pulse and is not breathing):    CPR (Attempt to Resuscitate)     Medical Interventions (Patient has pulse or is breathing):    Full Support     Admit Date: 1/6/2025       Hospital Day: 0  Referring Provider: Latanya Paez MD      Patient personally seen and examined.  Complete chart including Consults, Notes, Operative Reports, Labs, Cardiology, and Radiology studies reviewed as able.        Subjective:  Erick Luong is a 69 y.o. male for whom we were consulted for evaluation and treatment of acute kidney injury. Baseline chronic kidney disease stage 3b, baseline creatinine approximately 1.8.  Follows with Dr Lomas in our office. History of poorly controlled dialysis, hypertension, coronary artery disease, chronic diastolic CHF, BRITTNI, diabetic foot ulcer, obesity. On 1/06 was sent to ER from PCP office iwht rapid heart rate and dyspnea. In ER labs revealed creatinine 3.0. Noted to be in atrial fibrillation with RVR. Received IV Bumex and was admitted to medical floor. Currently is awake and alert. No dyspnea or pain at rest. Gets short of breath with any activity. Denies n/v/d. Urine output nonoliguric.     Allergies:  Cefepime, Bactrim [sulfamethoxazole-trimethoprim], Vancomycin, Zolpidem, and Metronidazole    Home Meds:  Medications Prior to Admission   Medication Sig Dispense Refill Last Dose/Taking    bumetanide (BUMEX) 2 MG tablet Take 1 tablet every day by oral route for 15 days.   1/6/2025    busPIRone (BUSPAR) 10 MG tablet Take 1 tablet by mouth 3 (Three) Times a Day As Needed (anxiety).   1/6/2025    docusate sodium 100 MG  capsule Take 1 capsule every day by oral route for 14 days.   Taking    apixaban (Eliquis) 5 MG tablet tablet Take 1 tablet by mouth 2 (Two) Times a Day. 60 tablet 0 Unknown    ascorbic acid (VITAMIN C) 1000 MG tablet Take 1 tablet by mouth Daily. 30 tablet 3 Unknown    cloNIDine (CATAPRES) 0.1 MG tablet 1 tablet Orally twice daily   Unknown    donepezil (ARICEPT) 10 MG tablet Take 1 tablet by mouth every night at bedtime. 30 tablet 0 Unknown    DULoxetine (CYMBALTA) 60 MG capsule Take 1 capsule by mouth Daily. 30 capsule 0 Unknown    famotidine (PEPCID) 20 MG tablet Take 1 tablet by mouth 2 (Two) Times a Day. 60 tablet 0 Unknown    Insulin Glargine (Lantus SoloStar) 100 UNIT/ML injection pen Inject 20 units nightly as directed 15 mL 3     insulin glargine (Lantus) 100 UNIT/ML injection Inject 35 Units under the skin into the appropriate area as directed every night at bedtime. 10 mL 0     Insulin Lispro (humaLOG) 100 UNIT/ML injection Inject 2-12 Units under the skin into the appropriate area as directed 4 (Four) Times a Day Before Meals & at Bedtime. Inject subcutaneously four times a day per sliding scale:  0-150 = 0 units; 151-200 = 2u; 201-250 = 4u; 251-300 = 6u; 301-350 = 8u; 351-400 = 10u; 401+ = 12u       ipratropium-albuterol (DUO-NEB) 0.5-2.5 mg/3 ml nebulizer Take 3 mL by nebulization 4 (Four) Times a Day As Needed. 360 mL 3     lisinopril (PRINIVIL,ZESTRIL) 5 MG tablet Take 1 tablet by mouth Daily. 30 tablet 0 Unknown    melatonin 3 MG tablet Take 2 tablets by mouth At Night As Needed for Sleep. 30 tablet 0     melatonin 3 MG tablet Take 1 tablet by mouth Daily. 30 tablet 0 Unknown    nitroglycerin (NITROSTAT) 0.4 MG SL tablet Place 1 tablet under the tongue Every 5 (Five) Minutes As Needed for Chest Pain. Take no more than 3 doses in 15 minutes.       nitroglycerin (NITROSTAT) 0.4 MG SL tablet Place 1 tablet as needed under the tongue every 5 minutes. If no improvement after 3 tablets go to ER 25 tablet  0     pantoprazole (PROTONIX) 40 MG EC tablet Take 1 tablet by mouth 2 (Two) Times a Day. 60 tablet 0 Unknown    pantoprazole (PROTONIX) 40 MG EC tablet Take 1 tablet by mouth 2 (Two) Times a Day. 90 tablet 4 Unknown    polyethylene glycol (MIRALAX) 17 GM/SCOOP powder Take 17 g by mouth Daily As Needed (constipation).   Unknown    potassium chloride ER (K-TAB) 20 MEQ tablet controlled-release ER tablet Take 1 tablet by mouth Daily. 30 tablet 0 Unknown    pregabalin (LYRICA) 100 MG capsule Take 1 capsule by mouth in the morning and 2 capsules before bedtime. 9 capsule 0     rosuvastatin (CRESTOR) 10 MG tablet Take 1 tablet by mouth Daily. 30 tablet 0 Unknown    rosuvastatin (CRESTOR) 10 MG tablet Take 1 tablet by mouth every night at bedtime. 30 tablet 2 Unknown    sennosides-docusate (PERICOLACE) 8.6-50 MG per tablet Take 1 tablet by mouth Every Night. Obtain OTC       sodium hypochlorite (DAKIN'S 1/4 STRENGTH) 0.125 % solution topical solution 0.125% Apply  topically to the appropriate area as directed 2 (Two) Times a Day. 473 mL 5     tamsulosin (FLOMAX) 0.4 MG capsule 24 hr capsule Take 1 capsule by mouth Daily. 90 capsule 4 Unknown       Medicines:  Current Facility-Administered Medications   Medication Dose Route Frequency Provider Last Rate Last Admin    acetaminophen (TYLENOL) tablet 650 mg  650 mg Oral Q4H PRN Latanya Paez MD   650 mg at 01/07/25 0044    Or    acetaminophen (TYLENOL) 160 MG/5ML oral solution 650 mg  650 mg Oral Q4H PRN Latanya Paez MD        Or    acetaminophen (TYLENOL) suppository 650 mg  650 mg Rectal Q4H PRN Latanya Paez MD        apixaban (ELIQUIS) tablet 5 mg  5 mg Oral BID Latanya Paez MD   5 mg at 01/07/25 0840    sennosides-docusate (PERICOLACE) 8.6-50 MG per tablet 2 tablet  2 tablet Oral BID PRN Latanya Paez MD   2 tablet at 01/06/25 2207    And    polyethylene glycol (MIRALAX) packet 17 g  17 g Oral Daily PRN Latanya Paez MD        And    bisacodyl (DULCOLAX) EC  tablet 5 mg  5 mg Oral Daily PRN Latanya Paez MD        And    bisacodyl (DULCOLAX) suppository 10 mg  10 mg Rectal Daily PRN Latanya Paez MD        bumetanide (BUMEX) injection 1 mg  1 mg Intravenous Q12H Latanya Paez MD   1 mg at 01/07/25 0556    busPIRone (BUSPAR) tablet 10 mg  10 mg Oral TID PRN Latanya Paez MD   10 mg at 01/07/25 0044    Calcium Replacement - Follow Nurse / BPA Driven Protocol   Not Applicable PRN Latanya Paez MD        dextrose (D50W) (25 g/50 mL) IV injection 25 g  25 g Intravenous Q15 Min PRN Latanya Paez MD        dextrose (GLUTOSE) oral gel 15 g  15 g Oral Q15 Min PRN Latanya Paez MD        dilTIAZem CD (CARDIZEM CD) 24 hr capsule 180 mg  180 mg Oral Q24H Latanya Paez MD   180 mg at 01/07/25 0840    donepezil (ARICEPT) tablet 10 mg  10 mg Oral Nightly Latanya Paez MD   10 mg at 01/06/25 2209    glucagon (GLUCAGEN) injection 1 mg  1 mg Intramuscular Q15 Min PRN Latanya Paez MD        Insulin Lispro (humaLOG) injection 2-9 Units  2-9 Units Subcutaneous 4x Daily AC & at Bedtime Latanya Paez MD   7 Units at 01/06/25 2207    ipratropium-albuterol (DUO-NEB) nebulizer solution 3 mL  3 mL Nebulization 4x Daily PRN Latanya Paez MD        Magnesium Standard Dose Replacement - Follow Nurse / BPA Driven Protocol   Not Applicable PRN Latanya Paez MD        Morphine sulfate (PF) injection 2 mg  2 mg Intravenous Q4H PRN Latanya Paez MD   2 mg at 01/07/25 0207    And    naloxone (NARCAN) injection 0.4 mg  0.4 mg Intravenous Q5 Min PRN Latanya Paez MD        nitroglycerin (NITROSTAT) SL tablet 0.4 mg  0.4 mg Sublingual Q5 Min PRN Latanya Paez MD        ondansetron (ZOFRAN) injection 4 mg  4 mg Intravenous Q6H PRN Latanya Paez MD        pantoprazole (PROTONIX) EC tablet 40 mg  40 mg Oral BID Latanya Paez MD   40 mg at 01/07/25 0840    Phosphorus Replacement - Follow Nurse / BPA Driven Protocol   Not Applicable PRN Latanya Paez MD        Potassium  Replacement - Follow Nurse / BPA Driven Protocol   Not Applicable PRN Latanya Paez MD        sodium chloride 0.9 % flush 10 mL  10 mL Intravenous PRN Karel Molina Jr., MD        sodium chloride 0.9 % flush 10 mL  10 mL Intravenous Q12H Latanya Paez MD   10 mL at 01/07/25 0841    sodium chloride 0.9 % flush 10 mL  10 mL Intravenous PRN Latanya Paez MD        sodium chloride 0.9 % infusion 40 mL  40 mL Intravenous PRN Latanya Paez MD           Past Medical History:  Past Medical History:   Diagnosis Date    Arthritis     Autonomic disease     CAD (coronary artery disease) 02/06/2017    Cervical radiculopathy 09/16/2021    Chronic constipation with acute exaccerbation 05/10/2021    Coronary artery disease     Degeneration of cervical intervertebral disc 08/11/2021    Diabetes mellitus     Diabetic foot ulcer 08/31/2020    Diabetic polyneuropathy associated with type 2 diabetes mellitus 01/18/2021    Elevated cholesterol     Gastroesophageal reflux disease 05/13/2019    Headache     HTN (hypertension), benign 05/03/2017    Hyperlipidemia     Hypertension     Mixed hyperlipidemia 02/07/2017    Multiple lung nodules 01/26/2020    5mm, 9 mm RLL identified 1/2020, not present 10/2019.    Myocardial infarction     Osteomyelitis 01/22/2020    Osteomyelitis of fifth toe of right foot 10/07/2019    Pancreatitis     Persistent insomnia 01/20/2020    Renal disorder     Sleep apnea 02/06/2017    Sleep apnea with use of continuous positive airway pressure (CPAP)     NON-COMPLIANT    Slow transit constipation 01/16/2019    Spinal stenosis in cervical region 09/16/2021    Vitamin D deficiency 03/02/2021       Past Surgical History:  Past Surgical History:   Procedure Laterality Date    ABDOMINAL SURGERY      AMPUTATION FOOT / TOE Left 10/2021    5th digit     ANTERIOR CERVICAL DISCECTOMY W/ FUSION N/A 08/05/2022    Procedure: CERVICAL DISCECTOMY ANTERIOR WITH FUSION C5-6 with possible plating of C5-7 with  neuromonitoring and 1 c-arm;  Surgeon: Karel Soliz MD;  Location: [facility] OR;  Service: Neurosurgery;  Laterality: N/A;    APPENDECTOMY      BACK SURGERY      CARDIAC CATHETERIZATION Left 02/08/2021    Procedure: Left Heart Cath w poss intervention left anatomical snuff box acess;  Surgeon: Omkar Charles DO;  Location: [facility] CATH INVASIVE LOCATION;  Service: Cardiology;  Laterality: Left;    CARDIAC SURGERY      CATARACT EXTRACTION      CERVICAL SPINE SURGERY      COLONOSCOPY N/A 01/31/2017    Normal exam repeat in 5 years    COLONOSCOPY N/A 02/11/2019    Mild acute inflammation    COLONOSCOPY N/A 04/07/2024    2 areas at 10 and 20 cm with friability ulceration 2 clips placed at 20 cm and 4 clips at 10 cm poor prep normal mucosa,mild eroisions and ulcerations in visible vessels    COLONOSCOPY N/A 7/1/2024    Procedure: COLONOSCOPY WITH ANESTHESIA;  Surgeon: Arsalan Lorenzo DO;  Location: [facility] ENDOSCOPY;  Service: Gastroenterology;  Laterality: N/A;  pre op constipation/diarrhea  post poor prep  pcp Del Shetty    COLONOSCOPY N/A 7/2/2024    Procedure: COLONOSCOPY WITH ANESTHESIA;  Surgeon: Agapito Christopher MD;  Location: [facility] ENDOSCOPY;  Service: Gastroenterology;  Laterality: N/A;  pre rectal bleeding  post poor prep  pcp Del Shetty MD    COLONOSCOPY W/ POLYPECTOMY  03/04/2014    Hyperplastic polyp    CORONARY ARTERY BYPASS GRAFT  10/2015    ENDOSCOPY  04/13/2011    Gastritis with hemorrhage    ENDOSCOPY N/A 05/05/2017    Normal exam    ENDOSCOPY N/A 02/11/2019    Gastritis    ENDOSCOPY N/A 09/01/2020    Non-erosive gastritis with hemorrhage    ENDOSCOPY N/A 02/10/2021    Esophagitis    ENDOSCOPY N/A 04/11/2024    There were esophageal mucosal changes suspicious for short-segment Low's esophagus present in the distal esophagus. The maximum longitudinal extent of these mucosal changes was 2 cm in length. Mucosa was biopsied with a cold forceps for histologyDistal esophagus,  biopsies: Mild chronic active esophagogastritis. No evidence of intestinal metaplasia, dysplasia. Antrum, bx, Mild chronic gastritis    FOOT SURGERY Left     INCISION AND DRAINAGE OF WOUND Left 2007    spider bite       Family History  Family History   Problem Relation Age of Onset    Colon cancer Father     Heart disease Father     Colon cancer Sister     Colon polyps Sister     Alzheimer's disease Mother     Coronary artery disease Sister     Coronary artery disease Sister        Social History  Social History     Socioeconomic History    Marital status:    Tobacco Use    Smoking status: Former     Current packs/day: 0.00     Types: Cigarettes     Quit date:      Years since quittin.0    Smokeless tobacco: Never    Tobacco comments:     smoked in [place]   Vaping Use    Vaping status: Never Used   Substance and Sexual Activity    Alcohol use: No    Drug use: No    Sexual activity: Defer         Review of Systems:  History obtained from chart review and the patient  General ROS: No fever or chills  Respiratory ROS: positive for - wheezing  Cardiovascular ROS: No chest pain or palpitations  Gastrointestinal ROS: No abdominal pain or melena  Genito-Urinary ROS: No dysuria or hematuria  Psych ROS: No anxiety and depression  14 point ROS reviewed with the patient and negative except as noted above and in the HPI unless unable to obtain.      Objective:  Patient Vitals for the past 24 hrs:   BP Temp Temp src Pulse Resp SpO2 Height Weight   25 0806 144/74 97.3 °F (36.3 °C) Oral 77 18 98 % -- --   25 0324 126/52 97.6 °F (36.4 °C) Oral 68 18 96 % -- --   25 0100 110/55 -- -- -- -- 95 % -- --   25 2327 145/79 97.8 °F (36.6 °C) Oral 91 18 100 % -- --   25 2119 156/77 97.7 °F (36.5 °C) Oral 95 22 95 % -- 128 kg (282 lb 3.2 oz)   25 156/93 -- -- 92 -- 92 % -- --   25 135/81 -- -- 98 -- 91 % -- --   25 -- -- -- -- -- 100 % -- --   25  "149/87 -- -- 87 -- -- -- --   01/06/25 1800 144/77 -- -- 92 -- 95 % -- --   01/06/25 1745 150/78 -- -- 85 -- 97 % -- --   01/06/25 1732 140/78 -- -- 100 -- -- -- --   01/06/25 1653 145/88 -- -- 94 -- -- -- --   01/06/25 1529 138/86 98.6 °F (37 °C) -- 102 19 98 % 182.9 cm (72\") 118 kg (260 lb)       Intake/Output Summary (Last 24 hours) at 1/7/2025 1021  Last data filed at 1/7/2025 1010  Gross per 24 hour   Intake --   Output 1400 ml   Net -1400 ml     General: awake/alert   Chest:  clear to auscultation bilaterally without respiratory distress  CVS:  IRRR  Abdominal: soft, nontender, positive bowel sounds  Extremities:  lower extremity edema  Skin: warm and dry without rash      Labs:  Results from last 7 days   Lab Units 01/07/25  0430 01/06/25  1848 01/06/25  1648   WBC 10*3/mm3 7.14 7.83 7.66   HEMOGLOBIN g/dL 8.4* 8.4* 8.1*   HEMATOCRIT % 26.7* 26.4* 25.3*   PLATELETS 10*3/mm3 250 239 229         Results from last 7 days   Lab Units 01/07/25  0430 01/06/25  1848 01/06/25  1648   SODIUM mmol/L 139 140 140   POTASSIUM mmol/L 4.6 5.1 5.2   CHLORIDE mmol/L 104 106 105   CO2 mmol/L 21.0* 21.0* 20.0*   BUN mg/dL 63* 64* 66*   CREATININE mg/dL 2.82* 3.06* 3.03*   CALCIUM mg/dL 8.8 8.8 8.7   EGFR mL/min/1.73 23.6* 21.4* 21.7*   BILIRUBIN mg/dL  --   --  0.3   ALK PHOS U/L  --   --  166*   ALT (SGPT) U/L  --   --  27   AST (SGOT) U/L  --   --  21   GLUCOSE mg/dL 118* 276* 289*       Radiology:   Imaging Results (Last 72 Hours)       Procedure Component Value Units Date/Time    XR Chest 1 View [[id number]] Collected: 01/06/25 1605     Updated: 01/06/25 1609    Narrative:      EXAM: XR CHEST 1 VW- 1/6/2025 3:02 PM     HISTORY: Shortness of breath       COMPARISON: 12/8/2024.     TECHNIQUE: Single frontal radiograph of the chest was obtained.     FINDINGS:     Support Devices: Prior CABG.     Cardiac and Mediastinal Silhouettes: Stable cardiomegaly.     Lungs/Pleura: Prominent perihilar interstitial opacities with " "vascular  indistinctness. Possible trace left effusion. No visible pneumothorax.     Osseous structures: No acute osseous finding.     Other: None.       Impression:         Pulmonary edema pattern with possible trace left effusion.           This report was signed and finalized on 1/6/2025 4:06 PM by Ronal Wisdom.               Culture:  No results found for: \"BLOODCX\", \"URINECX\", \"WOUNDCX\", \"MRSACX\", \"RESPCX\", \"STOOLCX\"      Assessment    Acute kidney injury  Baseline chronic kidney disease stage 3b  Type 2 diabetes  Hypertension  Anemia of CKD  Obesity   Metabolic acidosis    Plan:   Continue IV Bumex  Monitor labs  Further assessment and plan pending Dr Hartley's evaluation of patient      Thank you for the consult, we appreciate the opportunity to provide care to your patients.  Feel free to contact me if I can be of any further assistance.      Stewart Alvarez, APRN  1/7/2025  10:21 CST  "

## 2025-01-07 NOTE — CONSULTS
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 vascular  indistinctness. Possible trace left effusion. No visible pneumothorax.     Osseous structures: No acute osseous finding.     Other: None.       Impression:         Pulmonary edema pattern with possible trace left effusion.           This report was signed and finalized on 1/6/2025 4:06 PM by Ronal Wisdom.             Results for orders placed during the hospital encounter of 03/26/24    Adult Transthoracic Echo Complete W/ Cont if Necessary Per Protocol    Interpretation Summary    The study is technically difficult for diagnosis.  If there is concern for possible infective endocarditis, recommend transesophageal echocardiogram to better visualize the valves.    Left ventricular systolic function is normal. Left ventricular ejection fraction appears to be 61 - 65%.    Left ventricular wall thickness is consistent with mild concentric hypertrophy    Left ventricular diastolic function is consistent with (grade III w/high LAP) fixed restrictive pattern.    Mildly reduced right ventricular systolic function noted.    The left atrial cavity is mildly dilated.    There is mild calcification of the aortic valve. No aortic  valve regurgitation is present. No hemodynamically significant aortic valve stenosis is present.        Assessment     1.  Atrial Fibrillation with RVR and known permanent atrial fibrillation  2.  Acute on choric diastolic heart failure  3.  CKD  4.  Hypertension  5.  Hyperlipidemia  6.  Anemia  7.  T2DM  8.  Untreated BRITTNI  9.  Obesity BMI 38.27      Plan     Rates are improved on current medications. Continue this and provide new rx at discharge as patient doesn't think that he has access to all of his prior home medications.   Continue anticoagulation for CVA prophylaxis  Nephrology management diuretics  No changes at this time as his rates are well controlled.     Cardiology will sign off, please recall if needed.       Electronically signed by SUSAN Santos, 01/07/25, 12:26 PM CST.      Please note this cardiology consultation note is the result of a face to face consultation with the patient, in addition to reviewing medical records at length by myself, SUSAN Santos.       Time: 35 minutes
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
## 2025-01-07 NOTE — PLAN OF CARE
Goal Outcome Evaluation:              Outcome Evaluation: ALOX4. NC 3l. sob on exertion. prn meds given for pain. voiding via urinal.

## 2025-01-07 NOTE — PLAN OF CARE
Problem: Adult Inpatient Plan of Care  Goal: Plan of Care Review  Outcome: Progressing  Goal: Patient-Specific Goal (Individualized)  Outcome: Progressing  Goal: Absence of Hospital-Acquired Illness or Injury  Outcome: Progressing  Intervention: Identify and Manage Fall Risk  Recent Flowsheet Documentation  Taken 1/7/2025 1604 by Walt Will RN  Safety Promotion/Fall Prevention: safety round/check completed  Taken 1/7/2025 1508 by Walt Will RN  Safety Promotion/Fall Prevention: safety round/check completed  Taken 1/7/2025 1411 by Walt Will RN  Safety Promotion/Fall Prevention: safety round/check completed  Taken 1/7/2025 1302 by Walt Will RN  Safety Promotion/Fall Prevention: safety round/check completed  Taken 1/7/2025 1201 by Walt Will RN  Safety Promotion/Fall Prevention: safety round/check completed  Taken 1/7/2025 1104 by Walt Will RN  Safety Promotion/Fall Prevention: safety round/check completed  Taken 1/7/2025 1020 by Walt Will RN  Safety Promotion/Fall Prevention: safety round/check completed  Taken 1/7/2025 0905 by Walt Will RN  Safety Promotion/Fall Prevention: safety round/check completed  Taken 1/7/2025 0825 by Walt Will RN  Safety Promotion/Fall Prevention: safety round/check completed  Taken 1/7/2025 0735 by Walt Will RN  Safety Promotion/Fall Prevention: safety round/check completed  Intervention: Prevent Skin Injury  Recent Flowsheet Documentation  Taken 1/7/2025 1604 by Walt Will RN  Body Position: position changed independently  Taken 1/7/2025 1508 by Walt Will RN  Body Position: position changed independently  Taken 1/7/2025 1411 by Walt Will RN  Body Position: position changed independently  Taken 1/7/2025 1302 by Walt Will RN  Body Position: position changed independently  Taken 1/7/2025 1201 by Walt Will RN  Body Position: position changed independently  Taken 1/7/2025 1104 by Walt Will RN  Body Position: position changed  independently  Taken 1/7/2025 1020 by Walt Will RN  Body Position: position changed independently  Taken 1/7/2025 0905 by Walt Will RN  Body Position: position changed independently  Taken 1/7/2025 0825 by Walt Will RN  Body Position: position changed independently  Taken 1/7/2025 0735 by Walt Will RN  Body Position: position changed independently  Intervention: Prevent and Manage VTE (Venous Thromboembolism) Risk  Recent Flowsheet Documentation  Taken 1/7/2025 0735 by Walt Will RN  VTE Prevention/Management: (Eliquis) other (see comments)  Goal: Optimal Comfort and Wellbeing  Outcome: Progressing  Intervention: Provide Person-Centered Care  Recent Flowsheet Documentation  Taken 1/7/2025 0735 by Walt Will RN  Trust Relationship/Rapport: care explained  Goal: Readiness for Transition of Care  Outcome: Progressing     Problem: Pain Acute  Goal: Optimal Pain Control and Function  Outcome: Progressing  Intervention: Prevent or Manage Pain  Recent Flowsheet Documentation  Taken 1/7/2025 0735 by Walt Will RN  Medication Review/Management: medications reviewed   Goal Outcome Evaluation:

## 2025-01-08 ENCOUNTER — APPOINTMENT (OUTPATIENT)
Dept: CARDIOLOGY | Facility: HOSPITAL | Age: 70
DRG: 264 | End: 2025-01-08
Payer: MEDICARE

## 2025-01-08 PROBLEM — J96.90 RESPIRATORY FAILURE: Status: ACTIVE | Noted: 2025-01-08

## 2025-01-08 LAB
ANION GAP SERPL CALCULATED.3IONS-SCNC: 12 MMOL/L (ref 5–15)
ASCENDING AORTA: 3.8 CM
AV MEAN PRESS GRAD SYS DOP V1V2: 7.8 MMHG
AV VMAX SYS DOP: 179.7 CM/SEC
BASOPHILS # BLD AUTO: 0.06 10*3/MM3 (ref 0–0.2)
BASOPHILS NFR BLD AUTO: 0.9 % (ref 0–1.5)
BH CV ECHO MEAS - AO MAX PG: 12.9 MMHG
BH CV ECHO MEAS - AO ROOT DIAM: 3.3 CM
BH CV ECHO MEAS - AO V2 VTI: 40.1 CM
BH CV ECHO MEAS - AVA(I,D): 2.25 CM2
BH CV ECHO MEAS - EDV(CUBED): 177.6 ML
BH CV ECHO MEAS - EDV(MOD-SP2): 74.4 ML
BH CV ECHO MEAS - EDV(MOD-SP4): 137.8 ML
BH CV ECHO MEAS - EF(MOD-SP2): 24.6 %
BH CV ECHO MEAS - EF(MOD-SP4): 39.9 %
BH CV ECHO MEAS - ESV(CUBED): 103.3 ML
BH CV ECHO MEAS - ESV(MOD-SP2): 56.2 ML
BH CV ECHO MEAS - ESV(MOD-SP4): 82.9 ML
BH CV ECHO MEAS - FS: 16.5 %
BH CV ECHO MEAS - IVS/LVPW: 1.06 CM
BH CV ECHO MEAS - IVSD: 1.32 CM
BH CV ECHO MEAS - LA DIMENSION: 4.8 CM
BH CV ECHO MEAS - LAT PEAK E' VEL: 10 CM/SEC
BH CV ECHO MEAS - LV DIASTOLIC VOL/BSA (35-75): 53.8 CM2
BH CV ECHO MEAS - LV MASS(C)D: 310 GRAMS
BH CV ECHO MEAS - LV MAX PG: 2.41 MMHG
BH CV ECHO MEAS - LV MEAN PG: 1.19 MMHG
BH CV ECHO MEAS - LV SYSTOLIC VOL/BSA (12-30): 32.3 CM2
BH CV ECHO MEAS - LV V1 MAX: 77.7 CM/SEC
BH CV ECHO MEAS - LV V1 VTI: 16.1 CM
BH CV ECHO MEAS - LVIDD: 5.6 CM
BH CV ECHO MEAS - LVIDS: 4.7 CM
BH CV ECHO MEAS - LVOT AREA: 5.6 CM2
BH CV ECHO MEAS - LVOT DIAM: 2.7 CM
BH CV ECHO MEAS - LVPWD: 1.25 CM
BH CV ECHO MEAS - MED PEAK E' VEL: 7 CM/SEC
BH CV ECHO MEAS - MV MAX PG: 5.6 MMHG
BH CV ECHO MEAS - MV MEAN PG: 2.33 MMHG
BH CV ECHO MEAS - MV V2 VTI: 26 CM
BH CV ECHO MEAS - MVA(VTI): 3.5 CM2
BH CV ECHO MEAS - PA V2 MAX: 109.6 CM/SEC
BH CV ECHO MEAS - PI END-D VEL: 89.8 CM/SEC
BH CV ECHO MEAS - RV MAX PG: 1.39 MMHG
BH CV ECHO MEAS - RV V1 MAX: 58.9 CM/SEC
BH CV ECHO MEAS - RV V1 VTI: 12 CM
BH CV ECHO MEAS - RVDD: 4.4 CM
BH CV ECHO MEAS - SV(LVOT): 90.2 ML
BH CV ECHO MEAS - SV(MOD-SP2): 18.3 ML
BH CV ECHO MEAS - SV(MOD-SP4): 55 ML
BH CV ECHO MEAS - SVI(LVOT): 35.2 ML/M2
BH CV ECHO MEAS - SVI(MOD-SP2): 7.1 ML/M2
BH CV ECHO MEAS - SVI(MOD-SP4): 21.4 ML/M2
BH CV ECHO MEAS - TAPSE (>1.6): 1.5 CM
BH CV XLRA - RV BASE: 4.2 CM
BH CV XLRA - RV LENGTH: 8 CM
BH CV XLRA - RV MID: 2.7 CM
BH CV XLRA - TDI S': 9 CM/SEC
BUN SERPL-MCNC: 62 MG/DL (ref 8–23)
BUN/CREAT SERPL: 21.9 (ref 7–25)
CALCIUM SPEC-SCNC: 8.6 MG/DL (ref 8.6–10.5)
CHLORIDE SERPL-SCNC: 104 MMOL/L (ref 98–107)
CO2 SERPL-SCNC: 23 MMOL/L (ref 22–29)
CREAT SERPL-MCNC: 2.83 MG/DL (ref 0.76–1.27)
DEPRECATED RDW RBC AUTO: 47.3 FL (ref 37–54)
EGFRCR SERPLBLD CKD-EPI 2021: 23.5 ML/MIN/1.73
EOSINOPHIL # BLD AUTO: 0.85 10*3/MM3 (ref 0–0.4)
EOSINOPHIL NFR BLD AUTO: 12.7 % (ref 0.3–6.2)
ERYTHROCYTE [DISTWIDTH] IN BLOOD BY AUTOMATED COUNT: 14.3 % (ref 12.3–15.4)
GEN 5 1HR TROPONIN T REFLEX: 63 NG/L
GLUCOSE BLDC GLUCOMTR-MCNC: 227 MG/DL (ref 70–130)
GLUCOSE BLDC GLUCOMTR-MCNC: 273 MG/DL (ref 70–130)
GLUCOSE BLDC GLUCOMTR-MCNC: 286 MG/DL (ref 70–130)
GLUCOSE BLDC GLUCOMTR-MCNC: 370 MG/DL (ref 70–130)
GLUCOSE SERPL-MCNC: 183 MG/DL (ref 65–99)
HCT VFR BLD AUTO: 26.4 % (ref 37.5–51)
HGB BLD-MCNC: 8.3 G/DL (ref 13–17.7)
IMM GRANULOCYTES # BLD AUTO: 0.05 10*3/MM3 (ref 0–0.05)
IMM GRANULOCYTES NFR BLD AUTO: 0.7 % (ref 0–0.5)
IRON 24H UR-MRATE: 24 MCG/DL (ref 59–158)
IRON SATN MFR SERPL: 11 % (ref 20–50)
LEFT ATRIUM VOLUME INDEX: 41 ML/M2
LEFT ATRIUM VOLUME: 104 ML
LYMPHOCYTES # BLD AUTO: 0.85 10*3/MM3 (ref 0.7–3.1)
LYMPHOCYTES NFR BLD AUTO: 12.7 % (ref 19.6–45.3)
MCH RBC QN AUTO: 28.3 PG (ref 26.6–33)
MCHC RBC AUTO-ENTMCNC: 31.4 G/DL (ref 31.5–35.7)
MCV RBC AUTO: 90.1 FL (ref 79–97)
MONOCYTES # BLD AUTO: 0.77 10*3/MM3 (ref 0.1–0.9)
MONOCYTES NFR BLD AUTO: 11.5 % (ref 5–12)
NEUTROPHILS NFR BLD AUTO: 4.12 10*3/MM3 (ref 1.7–7)
NEUTROPHILS NFR BLD AUTO: 61.5 % (ref 42.7–76)
NRBC BLD AUTO-RTO: 0 /100 WBC (ref 0–0.2)
PLATELET # BLD AUTO: 276 10*3/MM3 (ref 140–450)
PMV BLD AUTO: 10.7 FL (ref 6–12)
POTASSIUM SERPL-SCNC: 5.3 MMOL/L (ref 3.5–5.2)
QT INTERVAL: 382 MS
QTC INTERVAL: 451 MS
RBC # BLD AUTO: 2.93 10*6/MM3 (ref 4.14–5.8)
SINUS: 4.1 CM
SODIUM SERPL-SCNC: 139 MMOL/L (ref 136–145)
STJ: 3 CM
TIBC SERPL-MCNC: 222 MCG/DL (ref 298–536)
TRANSFERRIN SERPL-MCNC: 149 MG/DL (ref 200–360)
TROPONIN T % DELTA: 2 %
TROPONIN T NUMERIC DELTA: 1 NG/L
TROPONIN T SERPL HS-MCNC: 62 NG/L
TROPONIN T SERPL HS-MCNC: NORMAL NG/L
URATE SERPL-MCNC: 7.1 MG/DL (ref 3.4–7)
WBC NRBC COR # BLD AUTO: 6.7 10*3/MM3 (ref 3.4–10.8)

## 2025-01-08 PROCEDURE — 97161 PT EVAL LOW COMPLEX 20 MIN: CPT

## 2025-01-08 PROCEDURE — 85025 COMPLETE CBC W/AUTO DIFF WBC: CPT | Performed by: HOSPITALIST

## 2025-01-08 PROCEDURE — 84550 ASSAY OF BLOOD/URIC ACID: CPT | Performed by: INTERNAL MEDICINE

## 2025-01-08 PROCEDURE — 99222 1ST HOSP IP/OBS MODERATE 55: CPT | Performed by: CLINICAL NURSE SPECIALIST

## 2025-01-08 PROCEDURE — 99497 ADVNCD CARE PLAN 30 MIN: CPT | Performed by: CLINICAL NURSE SPECIALIST

## 2025-01-08 PROCEDURE — 93306 TTE W/DOPPLER COMPLETE: CPT

## 2025-01-08 PROCEDURE — 25510000001 PERFLUTREN 6.52 MG/ML SUSPENSION 2 ML VIAL: Performed by: HOSPITALIST

## 2025-01-08 PROCEDURE — 0JBR0ZZ EXCISION OF LEFT FOOT SUBCUTANEOUS TISSUE AND FASCIA, OPEN APPROACH: ICD-10-PCS | Performed by: HOSPITALIST

## 2025-01-08 PROCEDURE — 80048 BASIC METABOLIC PNL TOTAL CA: CPT | Performed by: HOSPITALIST

## 2025-01-08 PROCEDURE — 93306 TTE W/DOPPLER COMPLETE: CPT | Performed by: INTERNAL MEDICINE

## 2025-01-08 PROCEDURE — 36415 COLL VENOUS BLD VENIPUNCTURE: CPT | Performed by: HOSPITALIST

## 2025-01-08 PROCEDURE — 84466 ASSAY OF TRANSFERRIN: CPT | Performed by: INTERNAL MEDICINE

## 2025-01-08 PROCEDURE — 99222 1ST HOSP IP/OBS MODERATE 55: CPT | Performed by: NURSE PRACTITIONER

## 2025-01-08 PROCEDURE — 99232 SBSQ HOSP IP/OBS MODERATE 35: CPT | Performed by: INTERNAL MEDICINE

## 2025-01-08 PROCEDURE — 11042 DBRDMT SUBQ TIS 1ST 20SQCM/<: CPT | Performed by: NURSE PRACTITIONER

## 2025-01-08 PROCEDURE — 84484 ASSAY OF TROPONIN QUANT: CPT

## 2025-01-08 PROCEDURE — 82948 REAGENT STRIP/BLOOD GLUCOSE: CPT

## 2025-01-08 PROCEDURE — 83540 ASSAY OF IRON: CPT | Performed by: INTERNAL MEDICINE

## 2025-01-08 PROCEDURE — 25010000002 ONDANSETRON PER 1 MG: Performed by: HOSPITALIST

## 2025-01-08 PROCEDURE — 25010000002 BUMETANIDE PER 0.5 MG: Performed by: HOSPITALIST

## 2025-01-08 PROCEDURE — 63710000001 INSULIN LISPRO (HUMAN) PER 5 UNITS: Performed by: HOSPITALIST

## 2025-01-08 RX ORDER — METOLAZONE 5 MG/1
5 TABLET ORAL ONCE
Status: COMPLETED | OUTPATIENT
Start: 2025-01-08 | End: 2025-01-08

## 2025-01-08 RX ORDER — METOPROLOL TARTRATE 25 MG/1
25 TABLET, FILM COATED ORAL EVERY 12 HOURS SCHEDULED
Status: DISCONTINUED | OUTPATIENT
Start: 2025-01-08 | End: 2025-01-10

## 2025-01-08 RX ORDER — CALCITRIOL 0.25 UG/1
0.25 CAPSULE, LIQUID FILLED ORAL DAILY
Status: DISCONTINUED | OUTPATIENT
Start: 2025-01-08 | End: 2025-01-13 | Stop reason: HOSPADM

## 2025-01-08 RX ADMIN — INSULIN LISPRO 6 UNITS: 100 INJECTION, SOLUTION INTRAVENOUS; SUBCUTANEOUS at 17:09

## 2025-01-08 RX ADMIN — BUMETANIDE 1 MG: 0.25 INJECTION INTRAMUSCULAR; INTRAVENOUS at 17:09

## 2025-01-08 RX ADMIN — PANTOPRAZOLE SODIUM 40 MG: 40 TABLET, DELAYED RELEASE ORAL at 20:21

## 2025-01-08 RX ADMIN — DILTIAZEM HYDROCHLORIDE 180 MG: 180 CAPSULE, EXTENDED RELEASE ORAL at 08:28

## 2025-01-08 RX ADMIN — OXYCODONE HYDROCHLORIDE 10 MG: 5 TABLET ORAL at 20:23

## 2025-01-08 RX ADMIN — APIXABAN 5 MG: 5 TABLET, FILM COATED ORAL at 08:28

## 2025-01-08 RX ADMIN — DOCUSATE SODIUM 50 MG AND SENNOSIDES 8.6 MG 2 TABLET: 8.6; 5 TABLET, FILM COATED ORAL at 20:21

## 2025-01-08 RX ADMIN — PERFLUTREN 10 ML: 6.52 INJECTION, SUSPENSION INTRAVENOUS at 16:02

## 2025-01-08 RX ADMIN — DONEPEZIL HYDROCHLORIDE 10 MG: 10 TABLET, FILM COATED ORAL at 20:21

## 2025-01-08 RX ADMIN — PANTOPRAZOLE SODIUM 40 MG: 40 TABLET, DELAYED RELEASE ORAL at 08:28

## 2025-01-08 RX ADMIN — Medication 10 ML: at 20:24

## 2025-01-08 RX ADMIN — APIXABAN 5 MG: 5 TABLET, FILM COATED ORAL at 20:21

## 2025-01-08 RX ADMIN — METOLAZONE 5 MG: 5 TABLET ORAL at 17:09

## 2025-01-08 RX ADMIN — INSULIN LISPRO 4 UNITS: 100 INJECTION, SOLUTION INTRAVENOUS; SUBCUTANEOUS at 08:28

## 2025-01-08 RX ADMIN — ONDANSETRON 4 MG: 2 INJECTION INTRAMUSCULAR; INTRAVENOUS at 10:19

## 2025-01-08 RX ADMIN — METOPROLOL TARTRATE 25 MG: 25 TABLET, FILM COATED ORAL at 20:21

## 2025-01-08 RX ADMIN — CALCITRIOL CAPSULES 0.25 MCG 0.25 MCG: 0.25 CAPSULE ORAL at 17:10

## 2025-01-08 RX ADMIN — INSULIN LISPRO 8 UNITS: 100 INJECTION, SOLUTION INTRAVENOUS; SUBCUTANEOUS at 20:24

## 2025-01-08 RX ADMIN — INSULIN LISPRO 6 UNITS: 100 INJECTION, SOLUTION INTRAVENOUS; SUBCUTANEOUS at 11:28

## 2025-01-08 RX ADMIN — Medication 10 ML: at 08:28

## 2025-01-08 RX ADMIN — OXYCODONE HYDROCHLORIDE 10 MG: 5 TABLET ORAL at 04:13

## 2025-01-08 RX ADMIN — BUMETANIDE 1 MG: 0.25 INJECTION INTRAMUSCULAR; INTRAVENOUS at 05:37
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
## 2025-01-08 NOTE — PLAN OF CARE
Goal Outcome Evaluation:  Plan of Care Reviewed With: patient           Outcome Evaluation: PT eval complete. pt in recliner upon arrival, AOx4 with complaints of generalized soreness currently. Pt reports he remained indep for ADLs but has spent most of his days recently in his wheelchair. Today pt demo functional AROM and strength in B LE but demo difficulty with donning L cam boot. Pt performed sit<>stand and ambulated in room 40' with CGA and FWW. After seated rest break pt ambulated 160' with CGA once again and left in recliner at completion of session. Pt maintained SpO2 >90% throughout session and will benefit from skilled PT services to improve mobility, decrease caregiver burden to don/doff cam boot and return to PLOF. Anticipate d/c home with assist from spouse when medically stable.    Anticipated Discharge Disposition (PT): home with assist

## 2025-01-08 NOTE — PROGRESS NOTES
Nephrology ([facility]) progress Note      Patient:  Erick Luong  YOB: 1955  Date of Service: 1/8/2025  MRN: [id number]   Acct: [id number]   Primary Care Physician: Del Shetty MD  Advance Directive:   Code Status and Medical Interventions: CPR (Attempt to Resuscitate); Full Support   Ordered at: 01/06/25 1800     Code Status (Patient has no pulse and is not breathing):    CPR (Attempt to Resuscitate)     Medical Interventions (Patient has pulse or is breathing):    Full Support     Admit Date: 1/6/2025       Hospital Day: 0  Referring Provider: Latanya Paez MD      Patient personally seen and examined.  Complete chart including Consults, Notes, Operative Reports, Labs, Cardiology, and Radiology studies reviewed as able.        Subjective:  Erick Luong is a 69 y.o. male for whom we were consulted for evaluation and treatment of acute kidney injury. Baseline chronic kidney disease stage 3b, baseline creatinine approximately 1.8.  Follows with Dr Lomas in our office. History of poorly controlled dialysis, hypertension, coronary artery disease, chronic diastolic CHF, BRITTNI, diabetic foot ulcer, obesity. On 1/06 was sent to ER from PCP office iwht rapid heart rate and dyspnea. In ER labs revealed creatinine 3.0. Noted to be in atrial fibrillation with RVR. Received IV Bumex and was admitted to medical floor. Currently is awake and alert. No dyspnea or pain at rest. Gets short of breath with any activity. Denies n/v/d. Urine output nonoliguric. Patient had chest pian overnight. Today he was weak with low appetite. He remains edematous.     Allergies:  Cefepime, Bactrim [sulfamethoxazole-trimethoprim], Vancomycin, Zolpidem, and Metronidazole    Home Meds:  Medications Prior to Admission   Medication Sig Dispense Refill Last Dose/Taking    apixaban (Eliquis) 5 MG tablet tablet Take 1 tablet by mouth 2 (Two) Times a Day. 60 tablet 0 Taking    ascorbic acid (VITAMIN C) 1000 MG  tablet Take 1 tablet by mouth Daily. 30 tablet 3 Taking    busPIRone (BUSPAR) 10 MG tablet Take 1 tablet by mouth 3 (Three) Times a Day As Needed (anxiety).   1/6/2025    donepezil (ARICEPT) 10 MG tablet Take 1 tablet by mouth every night at bedtime. 30 tablet 0 Taking    Insulin Glargine (Lantus SoloStar) 100 UNIT/ML injection pen Inject 20 units nightly as directed (Patient taking differently: Inject 20 Units under the skin into the appropriate area as directed Every Night.   Patient stated that he is still taking 35 units at bedtime) 15 mL 3 Taking Differently    oxyCODONE (ROXICODONE) 10 MG tablet Take 1 tablet by mouth Every 12 (Twelve) Hours As Needed for Moderate Pain.   Taking As Needed    pantoprazole (PROTONIX) 40 MG EC tablet Take 1 tablet by mouth 2 (Two) Times a Day. 90 tablet 4 Taking    potassium chloride ER (K-TAB) 20 MEQ tablet controlled-release ER tablet Take 1 tablet by mouth Daily. 30 tablet 0 Taking    rosuvastatin (CRESTOR) 10 MG tablet Take 1 tablet by mouth every night at bedtime. 30 tablet 2 Taking    sennosides-docusate (PERICOLACE) 8.6-50 MG per tablet Take 1 tablet by mouth Every Night. Obtain OTC   Taking    sertraline (ZOLOFT) 25 MG tablet Take 1 tablet by mouth Daily.   Taking    sodium hypochlorite (DAKIN'S 1/4 STRENGTH) 0.125 % solution topical solution 0.125% Apply  topically to the appropriate area as directed 2 (Two) Times a Day. 473 mL 5 Taking    tamsulosin (FLOMAX) 0.4 MG capsule 24 hr capsule Take 1 capsule by mouth Daily. 90 capsule 4 Taking    bumetanide (BUMEX) 2 MG tablet Take 1 tablet by mouth 2 (Two) Times a Day.       docusate sodium 100 MG capsule Take 1 capsule by mouth Daily.       famotidine (PEPCID) 20 MG tablet Take 1 tablet by mouth 2 (Two) Times a Day. 60 tablet 0 Unknown    Insulin Lispro (humaLOG) 100 UNIT/ML injection Inject 2-12 Units under the skin into the appropriate area as directed 4 (Four) Times a Day Before Meals & at Bedtime. Inject subcutaneously  four times a day per sliding scale:  0-150 = 0 units; 151-200 = 2u; 201-250 = 4u; 251-300 = 6u; 301-350 = 8u; 351-400 = 10u; 401+ = 12u       ipratropium-albuterol (DUO-NEB) 0.5-2.5 mg/3 ml nebulizer Take 3 mL by nebulization 4 (Four) Times a Day As Needed. (Patient taking differently: Take 3 mL by nebulization 4 (Four) Times a Day As Needed for Wheezing or Shortness of Air.) 360 mL 3     lisinopril (PRINIVIL,ZESTRIL) 5 MG tablet Take 1 tablet by mouth Daily. 30 tablet 0 Unknown    melatonin 3 MG tablet Take 2 tablets by mouth At Night As Needed for Sleep. 30 tablet 0     melatonin 3 MG tablet Take 1 tablet by mouth Daily. 30 tablet 0 Unknown    nitroglycerin (NITROSTAT) 0.4 MG SL tablet Place 1 tablet as needed under the tongue every 5 minutes. If no improvement after 3 tablets go to ER (Patient taking differently: Place 1 tablet under the tongue As Needed for Chest Pain.) 25 tablet 0     polyethylene glycol (MIRALAX) 17 GM/SCOOP powder Take 17 g by mouth Daily As Needed (constipation).   Unknown    pregabalin (LYRICA) 100 MG capsule Take 1 capsule by mouth 3 (Three) Times a Day.          Medicines:  Current Facility-Administered Medications   Medication Dose Route Frequency Provider Last Rate Last Admin    acetaminophen (TYLENOL) tablet 650 mg  650 mg Oral Q4H PRN Latanya Paez MD   650 mg at 01/07/25 2350    Or    acetaminophen (TYLENOL) 160 MG/5ML oral solution 650 mg  650 mg Oral Q4H PRN Latanya Paez MD        Or    acetaminophen (TYLENOL) suppository 650 mg  650 mg Rectal Q4H PRN Latanya Paez MD        apixaban (ELIQUIS) tablet 5 mg  5 mg Oral BID Latanya Paez MD   5 mg at 01/08/25 0828    sennosides-docusate (PERICOLACE) 8.6-50 MG per tablet 2 tablet  2 tablet Oral BID PRN Latanya Paez MD   2 tablet at 01/06/25 2207    And    polyethylene glycol (MIRALAX) packet 17 g  17 g Oral Daily PRN Latanya Paez MD        And    bisacodyl (DULCOLAX) EC tablet 5 mg  5 mg Oral Daily PRN Latanya Paez MD         And    bisacodyl (DULCOLAX) suppository 10 mg  10 mg Rectal Daily PRN Latanya Paez MD        bumetanide (BUMEX) injection 1 mg  1 mg Intravenous Q12H Latanya Paez MD   1 mg at 01/08/25 0537    busPIRone (BUSPAR) tablet 10 mg  10 mg Oral TID PRN Latanya Paez MD   10 mg at 01/07/25 0044    Calcium Replacement - Follow Nurse / BPA Driven Protocol   Not Applicable PRN Latanya Paez MD        dextrose (D50W) (25 g/50 mL) IV injection 25 g  25 g Intravenous Q15 Min PRN Latanya Paez MD        dextrose (GLUTOSE) oral gel 15 g  15 g Oral Q15 Min PRN Latanya Paez MD        dilTIAZem CD (CARDIZEM CD) 24 hr capsule 180 mg  180 mg Oral Q24H Latanya Paez MD   180 mg at 01/08/25 0828    donepezil (ARICEPT) tablet 10 mg  10 mg Oral Nightly Latanya Paez MD   10 mg at 01/07/25 2130    glucagon (GLUCAGEN) injection 1 mg  1 mg Intramuscular Q15 Min PRN Latanya Paez MD        Insulin Lispro (humaLOG) injection 2-9 Units  2-9 Units Subcutaneous 4x Daily AC & at Bedtime Latanya Paez MD   6 Units at 01/08/25 1128    ipratropium-albuterol (DUO-NEB) nebulizer solution 3 mL  3 mL Nebulization 4x Daily PRN Latanya Paez MD        Magnesium Standard Dose Replacement - Follow Nurse / BPA Driven Protocol   Not Applicable PRN Latanya Paez MD        Morphine sulfate (PF) injection 2 mg  2 mg Intravenous Q4H PRN Latanya Paez MD   2 mg at 01/07/25 2143    And    naloxone (NARCAN) injection 0.4 mg  0.4 mg Intravenous Q5 Min PRN Latanya Paez MD        nitroglycerin (NITROSTAT) SL tablet 0.4 mg  0.4 mg Sublingual Q5 Min PRN Latanya Paez MD   0.4 mg at 01/07/25 2353    ondansetron (ZOFRAN) injection 4 mg  4 mg Intravenous Q6H PRN Latanya Paez MD   4 mg at 01/08/25 1019    oxyCODONE (ROXICODONE) immediate release tablet 10 mg  10 mg Oral Q12H PRN Latanya Paez MD   10 mg at 01/08/25 0413    pantoprazole (PROTONIX) EC tablet 40 mg  40 mg Oral BID Latanya Paez MD   40 mg at 01/08/25 0828    Phosphorus  Replacement - Follow Nurse / BPA Driven Protocol   Not Applicable PRN Latanya Paez MD        Potassium Replacement - Follow Nurse / BPA Driven Protocol   Not Applicable Latanya Oropeza MD        sodium chloride 0.9 % flush 10 mL  10 mL Intravenous PRN Karel Molina Jr., MD        sodium chloride 0.9 % flush 10 mL  10 mL Intravenous Q12H Latanya Paez MD   10 mL at 01/08/25 0828    sodium chloride 0.9 % flush 10 mL  10 mL Intravenous PRN Latanya Paez MD        sodium chloride 0.9 % infusion 40 mL  40 mL Intravenous PRN Latanya Paez MD           Past Medical History:  Past Medical History:   Diagnosis Date    Arthritis     Autonomic disease     CAD (coronary artery disease) 02/06/2017    Cervical radiculopathy 09/16/2021    Chronic constipation with acute exaccerbation 05/10/2021    Coronary artery disease     Degeneration of cervical intervertebral disc 08/11/2021    Diabetes mellitus     Diabetic foot ulcer 08/31/2020    Diabetic polyneuropathy associated with type 2 diabetes mellitus 01/18/2021    Elevated cholesterol     Gastroesophageal reflux disease 05/13/2019    Headache     HTN (hypertension), benign 05/03/2017    Hyperlipidemia     Hypertension     Mixed hyperlipidemia 02/07/2017    Multiple lung nodules 01/26/2020    5mm, 9 mm RLL identified 1/2020, not present 10/2019.    Myocardial infarction     Osteomyelitis 01/22/2020    Osteomyelitis of fifth toe of right foot 10/07/2019    Pancreatitis     Persistent insomnia 01/20/2020    Renal disorder     Sleep apnea 02/06/2017    Sleep apnea with use of continuous positive airway pressure (CPAP)     NON-COMPLIANT    Slow transit constipation 01/16/2019    Spinal stenosis in cervical region 09/16/2021    Vitamin D deficiency 03/02/2021       Past Surgical History:  Past Surgical History:   Procedure Laterality Date    ABDOMINAL SURGERY      AMPUTATION FOOT / TOE Left 10/2021    5th digit     ANTERIOR CERVICAL DISCECTOMY W/ FUSION N/A  08/05/2022    Procedure: CERVICAL DISCECTOMY ANTERIOR WITH FUSION C5-6 with possible plating of C5-7 with neuromonitoring and 1 c-arm;  Surgeon: Karel Soliz MD;  Location: [facility] OR;  Service: Neurosurgery;  Laterality: N/A;    APPENDECTOMY      BACK SURGERY      CARDIAC CATHETERIZATION Left 02/08/2021    Procedure: Left Heart Cath w poss intervention left anatomical snuff box acess;  Surgeon: Omkar Charles DO;  Location: [facility] CATH INVASIVE LOCATION;  Service: Cardiology;  Laterality: Left;    CARDIAC SURGERY      CATARACT EXTRACTION      CERVICAL SPINE SURGERY      COLONOSCOPY N/A 01/31/2017    Normal exam repeat in 5 years    COLONOSCOPY N/A 02/11/2019    Mild acute inflammation    COLONOSCOPY N/A 04/07/2024    2 areas at 10 and 20 cm with friability ulceration 2 clips placed at 20 cm and 4 clips at 10 cm poor prep normal mucosa,mild eroisions and ulcerations in visible vessels    COLONOSCOPY N/A 7/1/2024    Procedure: COLONOSCOPY WITH ANESTHESIA;  Surgeon: Arsalan Lorenzo DO;  Location: [facility] ENDOSCOPY;  Service: Gastroenterology;  Laterality: N/A;  pre op constipation/diarrhea  post poor prep  pcp Del Shetty    COLONOSCOPY N/A 7/2/2024    Procedure: COLONOSCOPY WITH ANESTHESIA;  Surgeon: Agapito Christopher MD;  Location: [facility] ENDOSCOPY;  Service: Gastroenterology;  Laterality: N/A;  pre rectal bleeding  post poor prep  pcp Del Shetty MD    COLONOSCOPY W/ POLYPECTOMY  03/04/2014    Hyperplastic polyp    CORONARY ARTERY BYPASS GRAFT  10/2015    ENDOSCOPY  04/13/2011    Gastritis with hemorrhage    ENDOSCOPY N/A 05/05/2017    Normal exam    ENDOSCOPY N/A 02/11/2019    Gastritis    ENDOSCOPY N/A 09/01/2020    Non-erosive gastritis with hemorrhage    ENDOSCOPY N/A 02/10/2021    Esophagitis    ENDOSCOPY N/A 04/11/2024    There were esophageal mucosal changes suspicious for short-segment Low's esophagus present in the distal esophagus. The maximum longitudinal extent of these  mucosal changes was 2 cm in length. Mucosa was biopsied with a cold forceps for histologyDistal esophagus, biopsies: Mild chronic active esophagogastritis. No evidence of intestinal metaplasia, dysplasia. Antrum, bx, Mild chronic gastritis    FOOT SURGERY Left     INCISION AND DRAINAGE OF WOUND Left 2007    spider bite       Family History  Family History   Problem Relation Age of Onset    Colon cancer Father     Heart disease Father     Colon cancer Sister     Colon polyps Sister     Alzheimer's disease Mother     Coronary artery disease Sister     Coronary artery disease Sister        Social History  Social History     Socioeconomic History    Marital status:    Tobacco Use    Smoking status: Former     Current packs/day: 0.00     Types: Cigarettes     Quit date:      Years since quittin.0    Smokeless tobacco: Never    Tobacco comments:     smoked in [place]   Vaping Use    Vaping status: Never Used   Substance and Sexual Activity    Alcohol use: No    Drug use: No    Sexual activity: Defer         Review of Systems:  History obtained from chart review and the patient  General ROS: No fever or chills  Respiratory ROS: positive for - wheezing  Cardiovascular ROS: + chest pain, no palpitations  Gastrointestinal ROS: No abdominal pain or melena  Genito-Urinary ROS: No dysuria or hematuria  Psych ROS: No anxiety and depression  14 point ROS reviewed with the patient and negative except as noted above and in the HPI unless unable to obtain.      Objective:  Patient Vitals for the past 24 hrs:   BP Temp Temp src Pulse Resp SpO2   25 1120 134/64 98.1 °F (36.7 °C) Oral 79 18 99 %   25 0732 141/72 97.4 °F (36.3 °C) Oral 78 18 97 %   25 0410 132/63 97.7 °F (36.5 °C) Oral 80 18 100 %   25 2358 124/67 -- -- 83 18 --   25 2353 133/67 -- -- 76 20 --   25 2347 136/69 -- -- 80 18 96 %   25 2340 126/72 97.5 °F (36.4 °C) Oral 78 16 98 %   25 2035 119/62 98 °F  (36.7 °C) Oral 69 16 99 %   01/07/25 1607 137/72 98 °F (36.7 °C) Oral 70 16 99 %       Intake/Output Summary (Last 24 hours) at 1/8/2025 1434  Last data filed at 1/8/2025 1018  Gross per 24 hour   Intake 1200 ml   Output 3100 ml   Net -1900 ml     General: awake/alert   Chest:  clear to auscultation bilaterally without respiratory distress  CVS:  IRRR  Abdominal: soft, nontender, positive bowel sounds  Extremities:  lower extremity edema  Skin: warm and dry without rash      Labs:  Results from last 7 days   Lab Units 01/08/25  0014 01/07/25  0430 01/06/25  1848   WBC 10*3/mm3 6.70 7.14 7.83   HEMOGLOBIN g/dL 8.3* 8.4* 8.4*   HEMATOCRIT % 26.4* 26.7* 26.4*   PLATELETS 10*3/mm3 276 250 239         Results from last 7 days   Lab Units 01/08/25  0014 01/07/25  0430 01/06/25  1848 01/06/25  1648   SODIUM mmol/L 139 139 140 140   POTASSIUM mmol/L 5.3* 4.6 5.1 5.2   CHLORIDE mmol/L 104 104 106 105   CO2 mmol/L 23.0 21.0* 21.0* 20.0*   BUN mg/dL 62* 63* 64* 66*   CREATININE mg/dL 2.83* 2.82* 3.06* 3.03*   CALCIUM mg/dL 8.6 8.8 8.8 8.7   EGFR mL/min/1.73 23.5* 23.6* 21.4* 21.7*   BILIRUBIN mg/dL  --   --   --  0.3   ALK PHOS U/L  --   --   --  166*   ALT (SGPT) U/L  --   --   --  27   AST (SGOT) U/L  --   --   --  21   GLUCOSE mg/dL 183* 118* 276* 289*       Radiology:   Imaging Results (Last 72 Hours)       Procedure Component Value Units Date/Time    XR Chest 1 View [[id number]] Collected: 01/06/25 1605     Updated: 01/06/25 1609    Narrative:      EXAM: XR CHEST 1 VW- 1/6/2025 3:02 PM     HISTORY: Shortness of breath       COMPARISON: 12/8/2024.     TECHNIQUE: Single frontal radiograph of the chest was obtained.     FINDINGS:     Support Devices: Prior CABG.     Cardiac and Mediastinal Silhouettes: Stable cardiomegaly.     Lungs/Pleura: Prominent perihilar interstitial opacities with vascular  indistinctness. Possible trace left effusion. No visible pneumothorax.     Osseous structures: No acute osseous finding.    "  Other: None.       Impression:         Pulmonary edema pattern with possible trace left effusion.           This report was signed and finalized on 1/6/2025 4:06 PM by Ronal Wisdom.               Culture:  No results found for: \"BLOODCX\", \"URINECX\", \"WOUNDCX\", \"MRSACX\", \"RESPCX\", \"STOOLCX\"      Assessment    Acute kidney injury  Baseline chronic kidney disease stage 3b  Type 2 diabetes with renal disease  Hypertension  Anemia of CKD  Obesity   Metabolic acidosis  Hyperkalemia  Sec HPTH    Plan:   Continue IV Bumex, will add metolazone  Monitor labs  Low K diet  Add calcitriol.             Vinny Hartley MD  1/8/2025  14:34 CST  "

## 2025-01-08 NOTE — PLAN OF CARE
Problem: Adult Inpatient Plan of Care  Goal: Plan of Care Review  Outcome: Progressing  Goal: Patient-Specific Goal (Individualized)  Outcome: Progressing  Goal: Absence of Hospital-Acquired Illness or Injury  Outcome: Progressing  Intervention: Identify and Manage Fall Risk  Recent Flowsheet Documentation  Taken 1/8/2025 1605 by Walt Will RN  Safety Promotion/Fall Prevention: safety round/check completed  Taken 1/8/2025 1511 by Walt Will RN  Safety Promotion/Fall Prevention: patient off unit  Taken 1/8/2025 1419 by Walt iWll RN  Safety Promotion/Fall Prevention: safety round/check completed  Taken 1/8/2025 1308 by Walt Will RN  Safety Promotion/Fall Prevention: safety round/check completed  Taken 1/8/2025 1205 by Walt Will RN  Safety Promotion/Fall Prevention: safety round/check completed  Taken 1/8/2025 1110 by Walt Will RN  Safety Promotion/Fall Prevention: safety round/check completed  Taken 1/8/2025 1029 by Walt Will RN  Safety Promotion/Fall Prevention: safety round/check completed  Taken 1/8/2025 0906 by Walt Will RN  Safety Promotion/Fall Prevention: safety round/check completed  Taken 1/8/2025 0804 by Walt Will RN  Safety Promotion/Fall Prevention: safety round/check completed  Taken 1/8/2025 0727 by Walt Will RN  Safety Promotion/Fall Prevention: safety round/check completed  Intervention: Prevent Skin Injury  Recent Flowsheet Documentation  Taken 1/8/2025 1605 by Walt Will RN  Body Position: position changed independently  Taken 1/8/2025 1419 by Walt Will RN  Body Position: position changed independently  Taken 1/8/2025 1308 by Walt Will RN  Body Position: position changed independently  Taken 1/8/2025 1205 by Walt Will RN  Body Position: position changed independently  Taken 1/8/2025 1110 by Walt Will RN  Body Position: position changed independently  Taken 1/8/2025 1029 by Walt Will RN  Body Position: position changed independently  Taken  1/8/2025 0906 by Walt Will RN  Body Position: position changed independently  Taken 1/8/2025 0804 by Walt Will RN  Body Position: position changed independently  Taken 1/8/2025 0727 by Walt Will RN  Body Position: position changed independently  Intervention: Prevent and Manage VTE (Venous Thromboembolism) Risk  Recent Flowsheet Documentation  Taken 1/8/2025 0727 by Walt Will RN  VTE Prevention/Management: (Lovenox) other (see comments)  Goal: Optimal Comfort and Wellbeing  Outcome: Progressing  Intervention: Provide Person-Centered Care  Recent Flowsheet Documentation  Taken 1/8/2025 0727 by Walt Will RN  Trust Relationship/Rapport: care explained  Goal: Readiness for Transition of Care  Outcome: Progressing     Problem: Pain Acute  Goal: Optimal Pain Control and Function  Outcome: Progressing  Intervention: Prevent or Manage Pain  Recent Flowsheet Documentation  Taken 1/8/2025 0727 by Walt Will RN  Medication Review/Management: medications reviewed     Problem: Fall Injury Risk  Goal: Absence of Fall and Fall-Related Injury  Outcome: Progressing  Intervention: Identify and Manage Contributors  Recent Flowsheet Documentation  Taken 1/8/2025 0727 by Walt Will RN  Medication Review/Management: medications reviewed  Intervention: Promote Injury-Free Environment  Recent Flowsheet Documentation  Taken 1/8/2025 1605 by Walt Will RN  Safety Promotion/Fall Prevention: safety round/check completed  Taken 1/8/2025 1511 by Walt Will RN  Safety Promotion/Fall Prevention: patient off unit  Taken 1/8/2025 1419 by Walt Will RN  Safety Promotion/Fall Prevention: safety round/check completed  Taken 1/8/2025 1308 by Walt Will RN  Safety Promotion/Fall Prevention: safety round/check completed  Taken 1/8/2025 1205 by Walt Will RN  Safety Promotion/Fall Prevention: safety round/check completed  Taken 1/8/2025 1110 by Walt Will RN  Safety Promotion/Fall Prevention: safety round/check  completed  Taken 1/8/2025 1029 by Walt Will RN  Safety Promotion/Fall Prevention: safety round/check completed  Taken 1/8/2025 0906 by Walt Will RN  Safety Promotion/Fall Prevention: safety round/check completed  Taken 1/8/2025 0804 by Walt Will RN  Safety Promotion/Fall Prevention: safety round/check completed  Taken 1/8/2025 0727 by Walt Will RN  Safety Promotion/Fall Prevention: safety round/check completed     Problem: Skin Injury Risk Increased  Goal: Skin Health and Integrity  Outcome: Progressing  Intervention: Optimize Skin Protection  Recent Flowsheet Documentation  Taken 1/8/2025 0727 by Walt Will RN  Activity Management: up ad ghazal   Goal Outcome Evaluation:

## 2025-01-08 NOTE — CONSULTS
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      Total Protein 6.8 g/dL      Albumin 3.3 g/dL      ALT (SGPT) 27 U/L      AST (SGOT) 21 U/L      Alkaline Phosphatase 166 U/L      Total Bilirubin 0.3 mg/dL      Globulin 3.5 gm/dL      A/G Ratio 0.9 g/dL      BUN/Creatinine Ratio 21.8     Anion Gap 15.0 mmol/L      eGFR 21.7 mL/min/1.73     Narrative:      GFR Categories in Chronic Kidney Disease (CKD)      GFR Category          GFR (mL/min/1.73)    Interpretation  G1                     90 or greater         Normal or high (1)  G2                      60-89                Mild decrease (1)  G3a                   45-59                Mild to moderate decrease  G3b                   30-44                Moderate to severe decrease  G4                    15-29                Severe decrease  G5                    14 or less           Kidney failure          (1)In the absence of evidence of kidney disease, neither GFR category G1 or G2 fulfill the criteria for CKD.    eGFR calculation 2021 CKD-EPI creatinine equation, which does not include race as a factor    Magnesium [[id number]]  (Normal) Collected: 01/06/25 1648    Specimen: Blood from Arm, Left Updated: 01/06/25 1717      Magnesium 2.4 mg/dL     BNP [[id number]]  (Abnormal) Collected: 01/06/25 1648    Specimen: Blood from Arm, Left Updated: 01/06/25 1714     proBNP 5,180.0 pg/mL     Narrative:      This assay is used as an aid in the diagnosis of individuals suspected of having heart failure. It can be used as an aid in the diagnosis of acute decompensated heart failure (ADHF) in patients presenting with signs and symptoms of ADHF to the emergency department (ED). In addition, NT-proBNP of <300 pg/mL indicates ADHF is not likely.    Age Range Result Interpretation  NT-proBNP Concentration (pg/mL:      <50             Positive            >450                   Gray                 300-450                    Negative             <300    50-75           Positive            >900                  Gray                300-900                  Negative            <300      >75             Positive            >1800                  Gray                300-1800                  Negative            <300    Lactic Acid, Plasma [[id number]]  (Normal) Collected: 01/06/25 1648    Specimen: Blood from Arm, Left Updated: 01/06/25 1713     Lactate 1.0 mmol/L     Protime-INR [[id number]]  (Abnormal) Collected: 01/06/25 1648    Specimen: Blood from Arm, Left Updated: 01/06/25 1706     Protime 15.6 Seconds      INR 1.18    aPTT [[id number]]  (Normal) Collected: 01/06/25 1648    Specimen: Blood from Arm, Left Updated: 01/06/25 1706     PTT 36.0 seconds     CBC & Differential [[id number]]  (Abnormal) Collected: 01/06/25 1648    Specimen: Blood from Arm, Left Updated: 01/06/25 1657    Narrative:      The following orders were created for panel order CBC & Differential.  Procedure                               Abnormality         Status                     ---------                               -----------         ------                     CBC Auto Differential[[id number]]        Abnormal            Final result                 Please view results for these  tests on the individual orders.    CBC Auto Differential [[id number]]  (Abnormal) Collected: 01/06/25 1648    Specimen: Blood from Arm, Left Updated: 01/06/25 1657     WBC 7.66 10*3/mm3      RBC 2.80 10*6/mm3      Hemoglobin 8.1 g/dL      Hematocrit 25.3 %      MCV 90.4 fL      MCH 28.9 pg      MCHC 32.0 g/dL      RDW 14.6 %      RDW-SD 48.5 fl      MPV 11.3 fL      Platelets 229 10*3/mm3      Neutrophil % 76.7 %      Lymphocyte % 7.6 %      Monocyte % 8.9 %      Eosinophil % 5.9 %      Basophil % 0.4 %      Immature Grans % 0.5 %      Neutrophils, Absolute 5.88 10*3/mm3      Lymphocytes, Absolute 0.58 10*3/mm3      Monocytes, Absolute 0.68 10*3/mm3      Eosinophils, Absolute 0.45 10*3/mm3      Basophils, Absolute 0.03 10*3/mm3      Immature Grans, Absolute 0.04 10*3/mm3      nRBC 0.0 /100 WBC     Blood Gas, Arterial With Co-Ox [[id number]]  (Abnormal) Collected: 01/06/25 1626    Specimen: Arterial Blood Updated: 01/06/25 1628     Site Right Radial     Dae's Test Positive     pH, Arterial 7.388 pH units      pCO2, Arterial 34.3 mm Hg      Comment: 84 Value below reference range        pO2, Arterial 67.2 mm Hg      Comment: 84 Value below reference range        HCO3, Arterial 20.6 mmol/L      Base Excess, Arterial -3.9 mmol/L      Comment: 84 Value below reference range        O2 Saturation, Arterial 93.9 %      Comment: 84 Value below reference range        Hemoglobin, Blood Gas 8.4 g/dL      Comment: 84 Value below reference range        Hematocrit, Blood Gas 25.8 %      Comment: 84 Value below reference range        Oxyhemoglobin 92.3 %      Comment: 84 Value below reference range        Methemoglobin 0.50 %      Carboxyhemoglobin 1.3 %      Temperature 37.0     Sodium, Arterial 142 mmol/L      Potassium, Arterial 4.7 mmol/L      Barometric Pressure for Blood Gas 759 mmHg      Modality Room Air     Ventilator Mode NA     Collected by [id number]     Comment: Meter: [id number]     :  Astrid Ralph,  RRT        pH, Temp Corrected 7.388 pH Units      pCO2, Temperature Corrected 34.3 mm Hg      pO2, Temperature Corrected 67.2 mm Hg           Imaging Results (Last 72 Hours)       Procedure Component Value Units Date/Time    XR Chest 1 View [[id number]] Collected: 01/06/25 1605     Updated: 01/06/25 1609    Narrative:      EXAM: XR CHEST 1 VW- 1/6/2025 3:02 PM     HISTORY: Shortness of breath       COMPARISON: 12/8/2024.     TECHNIQUE: Single frontal radiograph of the chest was obtained.     FINDINGS:     Support Devices: Prior CABG.     Cardiac and Mediastinal Silhouettes: Stable cardiomegaly.     Lungs/Pleura: Prominent perihilar interstitial opacities with vascular  indistinctness. Possible trace left effusion. No visible pneumothorax.     Osseous structures: No acute osseous finding.     Other: None.       Impression:         Pulmonary edema pattern with possible trace left effusion.           This report was signed and finalized on 1/6/2025 4:06 PM by Ronal Wisdom.                  ASSESSMENT/PLAN       Examination and evaluation of wound(s) was performed.    An excisional tissue debridement was completed of the wound located on the left foot with a total area of 9.9 cm². The following instruments were used: Curette. Pain control was achieved using lidocaine 4% topical solution. Material removed includes slough, subcutaneous tissue, and callus.  No specimens were taken. A minimal amount of bleeding was controlled with pressure. The procedure was tolerated well with a pain level of 0 prior to procedure and a pain level of 0 following the procedure. Pre debridement measurements: Left plantar lateral midfoot measures 1 cm x 0.8 cm and left plantar heel wound measures 2.4 cm x 2.7 cm. Post debridement measurements: Left plantar lateral midfoot measures 1.2 cm x 1 cm x 0.1 cm and left plantar heel wound measures 2.4 cm x 3.5 cm x 0.2 cm. Tissue exposed post debridement is subcutaneous tissue.     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  Large amount of callus removed which was covering part of wound beds to reveal slightly larger wound measurements.  Orders placed for wound care as listed below.   Patient to follow-up with [facility] care center as that is where he is established for wound care.    Discussed findings and treatment plan including risks, benefits, and treatment options with patient in detail. Patient agreed with treatment plan.      This document has been electronically signed by SUSAN Saab on 1/8/2025 09:27 CST
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
## 2025-01-08 NOTE — PLAN OF CARE
Goal Outcome Evaluation:  Plan of Care Reviewed With: patient        Progress: no change  Outcome Evaluation: A/A/Ox4. pt SOB w/ exertion. pt had episode of sharp chest pain at 2340, EKG obtained, improved with three sublingual nitros, tylenol given for headache. Trops ordered. bed alarm on, plan of care reviewed

## 2025-01-08 NOTE — PROGRESS NOTES
"  Chief Complaint   Patient presents with    Shortness of Breath     S: Overnight, the patient did have an episode of chest discomfort.  ECG performed at that time showed no acute changes.  Troponin levels were checked and found to be flat.  The patient denies having any chest discomfort today.  He notes that his breathing is improving.  He denies having any palpitations.    Medications: Reviewed    Review of Systems: All pertinent negatives and positives as noted above.  Otherwise, all systems reviewed and found to be negative.    Telemetry: Atrial fibrillation with controlled heart rate, occasional PVCs    O:  /59 (BP Location: Left arm, Patient Position: Lying)   Pulse 76   Temp 98 °F (36.7 °C) (Oral)   Resp 18   Ht 182.9 cm (72\")   Wt 128 kg (282 lb)   SpO2 100%   BMI 38.25 kg/m²   Temp:  [97.4 °F (36.3 °C)-98.1 °F (36.7 °C)] 98 °F (36.7 °C)  Heart Rate:  [69-83] 76  Resp:  [16-20] 18  BP: (119-141)/(59-72) 128/59    Intake/Output Summary (Last 24 hours) at 1/8/2025 1707  Last data filed at 1/8/2025 1120  Gross per 24 hour   Intake 1200 ml   Output 2100 ml   Net -900 ml     Gen: Chronically ill in appearance but in no acute distress, sitting up in a chair at bedside  CV: Irregularly irregular, normal rate, no audible murmur  Pulmonary: Decreased breath sounds bilaterally but otherwise relatively clear to auscultation  GI: Morbidly obese, soft, nontender to palpation, active bowel sounds  Extremities: Warm and well-perfused, trace peripheral edema    Diagnostic Data:    Lab Results   Component Value Date    WBC 6.70 01/08/2025    HGB 8.3 (L) 01/08/2025    HCT 26.4 (L) 01/08/2025    MCV 90.1 01/08/2025     01/08/2025     Lab Results   Component Value Date    GLUCOSE 183 (H) 01/08/2025    CALCIUM 8.6 01/08/2025     01/08/2025    K 5.3 (H) 01/08/2025    CO2 23.0 01/08/2025     01/08/2025    BUN 62 (H) 01/08/2025    CREATININE 2.83 (H) 01/08/2025    EGFR 23.5 (L) 01/08/2025    BCR 21.9 " 01/08/2025    ANIONGAP 12.0 01/08/2025     ECG last night 11:44 PM: Atrial fibrillation, ventricular rate 84, left axis deviation, no acute ST segment changes    Results for orders placed during the hospital encounter of 01/06/25    Adult Transthoracic Echo Complete W/ Cont if Necessary Per Protocol    Interpretation Summary    Left ventricular systolic function is moderately decreased. Left ventricular ejection fraction appears to be 36 - 40%.    Left ventricular wall thickness is consistent with mild concentric hypertrophy.    Normal right ventricular cavity size noted. Mildly reduced right ventricular systolic function noted.    No significant valvular abnormalities identified on this study.    ASSESSMENT/PLAN:    1.  Permanent atrial fibrillation, rapid ventricular sponsor upon arrival: Now with controlled heart rate.  2.  Acute systolic heart failure: The patient is known to have diastolic dysfunction and chronic diastolic heart failure, however his echocardiogram today does show a new decline in ejection fraction compared to the echocardiogram back in April of this year.  Therefore, it is thought that he has some degree of acute systolic heart failure, etiology likely due to ischemic cardiomyopathy or perhaps related to uncontrolled heart rate, full etiology is unknown at this time and I cannot clarify further at this time  3.  Acute on chronic renal insufficiency  4.  Coronary artery disease in native coronary artery, status post prior coronary artery bypass grafting  5.  Primary hypertension  6.  Mixed hyperlipidemia  7.  Type 2 diabetes mellitus  8.  Untreated obstructive sleep apnea  9.  Morbid obesity  10.  Anemia without active bleeding  11: Hyperkalemia, noted on this morning's labs    -The patient is clinically improving.  He reports less shortness of breath.  His heart rate is improved considerably.  A new finding today is the decline in ejection fraction.  The chronicity of this is unknown but likely  acute systolic heart failure is contributing to his current presentation.  -In regards to atrial fibrillation, his rate is controlled.  He does continue Cardizem and is also anticoagulated with Eliquis.  However, with his new diagnosis of left ventricular systolic dysfunction, will discontinue Cardizem in favor of a beta-blocker.  The beta-blocker can be uptitrated to achieve desired heart rate effect as well as blood pressure will tolerate.  -The patient currently has acute on chronic renal insufficiency, therefore no indication for ACE inhibitor or angiotensin receptor blocker therapies at this time.  Blood pressure will tolerate tomorrow, consider hydralazine and Isordil combination.  Long-term, would be ideal to be on an angiotensin receptor blocker.  -Nephrology currently managing diuretics.  The patient is receiving Bumex 1 mg IV every 12 hours.  Also, he was given a dose of metolazone today.  Urine output has been good according to the patient.  -The patient and his wife mention that a cardiac catheterization was discussed with them by the primary service this morning as a potential option this hospitalization.  This is not safe given the acute renal insufficiency.  Furthermore, the patient has had a flat troponin level in the face of intermittent episodes of chest discomfort, therefore has not had an acute coronary syndrome.  I see no indication for coronary angiography at this time.  -We will reevaluate again tomorrow.
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
## 2025-01-08 NOTE — SIGNIFICANT NOTE
I was called on this patient for having chest pain.  Patient improved after receiving Nitrostat.  EKG showing A-fib but no ST changes.  I placed an order for troponin stat.  Will follow.

## 2025-01-08 NOTE — CASE MANAGEMENT/SOCIAL WORK
Discharge Planning Assessment  [facility]     Patient Name: Erick Luong  MRN: [id number]  Today's Date: 1/8/2025    Admit Date: 1/6/2025        Discharge Needs Assessment       Row Name 01/08/25 1047       Living Environment    People in Home spouse    Current Living Arrangements hotel/motel    Potentially Unsafe Housing Conditions none    In the past 12 months has the electric, gas, oil, or water company threatened to shut off services in your home? No    Primary Care Provided by self    Provides Primary Care For no one    Family Caregiver if Needed spouse    Family Caregiver Names Joan    Quality of Family Relationships helpful;involved;supportive    Able to Return to Prior Arrangements yes       Resource/Environmental Concerns    Resource/Environmental Concerns none    Transportation Concerns none       Transportation Needs    In the past 12 months, has lack of transportation kept you from medical appointments or from getting medications? no    In the past 12 months, has lack of transportation kept you from meetings, work, or from getting things needed for daily living? No       Food Insecurity    Within the past 12 months, you worried that your food would run out before you got the money to buy more. Never true    Within the past 12 months, the food you bought just didn't last and you didn't have money to get more. Never true       Transition Planning    Patient/Family Anticipates Transition to home with family    Patient/Family Anticipated Services at Transition none    Transportation Anticipated family or friend will provide       Discharge Needs Assessment    Readmission Within the Last 30 Days no previous admission in last 30 days    Equipment Currently Used at Home none    Do you want help finding or keeping work or a job? I do not need or want help    Do you want help with school or training? For example, starting or completing job training or getting a high school diploma, GED or equivalent No     Anticipated Changes Related to Illness none    Equipment Needed After Discharge none    Provided Post Acute Provider List? N/A    Provided Post Acute Provider Quality & Resource List? N/A    Discharge Coordination/Progress Wife abd patient currently like in a hotel R/T their residence burning. Wife is able to help pt with needs. Drives self but wife can provide transportation if need be. Has PCP and Rx coverage. No D/C needs identified at this time.                   Discharge Plan    No documentation.                 Continued Care and Services - Admitted Since 1/6/2025    No active coordination exists for this encounter.       Selected Continued Care - Prior Encounters Includes continued care and service providers with selected services from prior encounters from 10/8/2024 to 1/8/2025      Discharged on 12/10/2024 Admission date: 12/8/2024 - Discharge disposition: Skilled Nursing Facility (DC - External)      Destination       Service Provider Services Address Phone Fax Patient Preferred    [facility] Sk[facility] Nursing [address] [place] [id number] [phone] --                             Demographic Summary    No documentation.                  Functional Status    No documentation.                  Psychosocial    No documentation.                  Abuse/Neglect    No documentation.                  Legal    No documentation.                  Substance Abuse    No documentation.                  Patient Forms    No documentation.                     Tyrell Reed RN
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
## 2025-01-08 NOTE — NURSING NOTE
[facility]  INPATIENT WOUND & OSTOMY CARE    Today's Date: 01/08/25    Patient Name: Erick Luong  MRN: [id number]  CSN: [id number]  PCP: Del Shetty MD  Attending Provider: Latanya Paez MD  Length of Stay: 0    Wound care consulted for left heel wound, present on admission. Nursing has uploaded picture to chart. Patient was admitted with respiratory failure on 1/6/2025. Patient is currently sitting up in his recliner resting.     Patient goes to [facility] for wound care. He says they have recently been treating his wounds with [facility].     Wound: left heel and left dorsal foot  Base: subcutaneous tissue and slough present   Periwound: dry, callused   Edges: irregular, attached to wound bed  Drainage:  None    Wounds need to be debrided. Patient is agreeable. Will consult SUSAN Ramos for bedside debridement.     Inpatient wound care will continue to follow during hospital stay.  Please contact if any issues or concerns arise.     This document has been electronically signed by Ada Choe RN on 1/8/2025 09:11 CST

## 2025-01-09 ENCOUNTER — APPOINTMENT (OUTPATIENT)
Dept: CT IMAGING | Facility: HOSPITAL | Age: 70
DRG: 264 | End: 2025-01-09
Payer: MEDICARE

## 2025-01-09 LAB
ALBUMIN SERPL-MCNC: 3.4 G/DL (ref 3.5–5.2)
ALBUMIN/GLOB SERPL: 0.9 G/DL
ALP SERPL-CCNC: 169 U/L (ref 39–117)
ALT SERPL W P-5'-P-CCNC: 21 U/L (ref 1–41)
ANION GAP SERPL CALCULATED.3IONS-SCNC: 16 MMOL/L (ref 5–15)
ANION GAP SERPL CALCULATED.3IONS-SCNC: 16 MMOL/L (ref 5–15)
AST SERPL-CCNC: 21 U/L (ref 1–40)
BASOPHILS # BLD AUTO: 0.09 10*3/MM3 (ref 0–0.2)
BASOPHILS NFR BLD AUTO: 1 % (ref 0–1.5)
BILIRUB SERPL-MCNC: 0.4 MG/DL (ref 0–1.2)
BUN SERPL-MCNC: 62 MG/DL (ref 8–23)
BUN SERPL-MCNC: 63 MG/DL (ref 8–23)
BUN/CREAT SERPL: 21.6 (ref 7–25)
BUN/CREAT SERPL: 23.5 (ref 7–25)
CALCIUM SPEC-SCNC: 8.9 MG/DL (ref 8.6–10.5)
CALCIUM SPEC-SCNC: 9.1 MG/DL (ref 8.6–10.5)
CHLORIDE SERPL-SCNC: 100 MMOL/L (ref 98–107)
CHLORIDE SERPL-SCNC: 102 MMOL/L (ref 98–107)
CO2 SERPL-SCNC: 19 MMOL/L (ref 22–29)
CO2 SERPL-SCNC: 21 MMOL/L (ref 22–29)
CREAT SERPL-MCNC: 2.68 MG/DL (ref 0.76–1.27)
CREAT SERPL-MCNC: 2.87 MG/DL (ref 0.76–1.27)
DEPRECATED RDW RBC AUTO: 47.8 FL (ref 37–54)
EGFRCR SERPLBLD CKD-EPI 2021: 23.1 ML/MIN/1.73
EGFRCR SERPLBLD CKD-EPI 2021: 25.1 ML/MIN/1.73
EOSINOPHIL # BLD AUTO: 0.89 10*3/MM3 (ref 0–0.4)
EOSINOPHIL NFR BLD AUTO: 9.8 % (ref 0.3–6.2)
ERYTHROCYTE [DISTWIDTH] IN BLOOD BY AUTOMATED COUNT: 14.4 % (ref 12.3–15.4)
GEN 5 1HR TROPONIN T REFLEX: 66 NG/L
GLOBULIN UR ELPH-MCNC: 4 GM/DL
GLUCOSE BLDC GLUCOMTR-MCNC: 241 MG/DL (ref 70–130)
GLUCOSE BLDC GLUCOMTR-MCNC: 258 MG/DL (ref 70–130)
GLUCOSE BLDC GLUCOMTR-MCNC: 276 MG/DL (ref 70–130)
GLUCOSE BLDC GLUCOMTR-MCNC: 320 MG/DL (ref 70–130)
GLUCOSE SERPL-MCNC: 277 MG/DL (ref 65–99)
GLUCOSE SERPL-MCNC: 282 MG/DL (ref 65–99)
HCT VFR BLD AUTO: 29.9 % (ref 37.5–51)
HGB BLD-MCNC: 9.2 G/DL (ref 13–17.7)
IMM GRANULOCYTES # BLD AUTO: 0.1 10*3/MM3 (ref 0–0.05)
IMM GRANULOCYTES NFR BLD AUTO: 1.1 % (ref 0–0.5)
LIPASE SERPL-CCNC: 12 U/L (ref 13–60)
LYMPHOCYTES # BLD AUTO: 1.18 10*3/MM3 (ref 0.7–3.1)
LYMPHOCYTES NFR BLD AUTO: 13.1 % (ref 19.6–45.3)
MAGNESIUM SERPL-MCNC: 2.1 MG/DL (ref 1.6–2.4)
MCH RBC QN AUTO: 28.1 PG (ref 26.6–33)
MCHC RBC AUTO-ENTMCNC: 30.8 G/DL (ref 31.5–35.7)
MCV RBC AUTO: 91.4 FL (ref 79–97)
MONOCYTES # BLD AUTO: 0.95 10*3/MM3 (ref 0.1–0.9)
MONOCYTES NFR BLD AUTO: 10.5 % (ref 5–12)
NEUTROPHILS NFR BLD AUTO: 5.83 10*3/MM3 (ref 1.7–7)
NEUTROPHILS NFR BLD AUTO: 64.5 % (ref 42.7–76)
NRBC BLD AUTO-RTO: 0 /100 WBC (ref 0–0.2)
PLATELET # BLD AUTO: 380 10*3/MM3 (ref 140–450)
PMV BLD AUTO: 11.7 FL (ref 6–12)
POTASSIUM SERPL-SCNC: 5 MMOL/L (ref 3.5–5.2)
POTASSIUM SERPL-SCNC: 6 MMOL/L (ref 3.5–5.2)
PROT SERPL-MCNC: 7.4 G/DL (ref 6–8.5)
QT INTERVAL: 378 MS
QTC INTERVAL: 444 MS
RBC # BLD AUTO: 3.27 10*6/MM3 (ref 4.14–5.8)
SODIUM SERPL-SCNC: 137 MMOL/L (ref 136–145)
SODIUM SERPL-SCNC: 137 MMOL/L (ref 136–145)
TROPONIN T % DELTA: 3 %
TROPONIN T NUMERIC DELTA: 2 NG/L
TROPONIN T SERPL HS-MCNC: 64 NG/L
WBC NRBC COR # BLD AUTO: 9.04 10*3/MM3 (ref 3.4–10.8)

## 2025-01-09 PROCEDURE — 83690 ASSAY OF LIPASE: CPT

## 2025-01-09 PROCEDURE — 74176 CT ABD & PELVIS W/O CONTRAST: CPT

## 2025-01-09 PROCEDURE — 97116 GAIT TRAINING THERAPY: CPT

## 2025-01-09 PROCEDURE — 99232 SBSQ HOSP IP/OBS MODERATE 35: CPT | Performed by: NURSE PRACTITIONER

## 2025-01-09 PROCEDURE — 85025 COMPLETE CBC W/AUTO DIFF WBC: CPT | Performed by: HOSPITALIST

## 2025-01-09 PROCEDURE — 25010000002 HYDROMORPHONE 1 MG/ML SOLUTION

## 2025-01-09 PROCEDURE — 84484 ASSAY OF TROPONIN QUANT: CPT

## 2025-01-09 PROCEDURE — 63710000001 INSULIN REGULAR HUMAN PER 5 UNITS

## 2025-01-09 PROCEDURE — 99222 1ST HOSP IP/OBS MODERATE 55: CPT | Performed by: INTERNAL MEDICINE

## 2025-01-09 PROCEDURE — 80053 COMPREHEN METABOLIC PANEL: CPT

## 2025-01-09 PROCEDURE — 25010000002 ONDANSETRON PER 1 MG: Performed by: HOSPITALIST

## 2025-01-09 PROCEDURE — 82948 REAGENT STRIP/BLOOD GLUCOSE: CPT

## 2025-01-09 PROCEDURE — 25010000002 BUMETANIDE PER 0.5 MG: Performed by: HOSPITALIST

## 2025-01-09 PROCEDURE — 63710000001 INSULIN LISPRO (HUMAN) PER 5 UNITS: Performed by: HOSPITALIST

## 2025-01-09 PROCEDURE — 25010000002 MORPHINE PER 10 MG: Performed by: HOSPITALIST

## 2025-01-09 PROCEDURE — 83735 ASSAY OF MAGNESIUM: CPT

## 2025-01-09 PROCEDURE — 25010000002 CALCIUM GLUCONATE 2-0.675 GM/100ML-% SOLUTION

## 2025-01-09 PROCEDURE — 93010 ELECTROCARDIOGRAM REPORT: CPT | Performed by: INTERNAL MEDICINE

## 2025-01-09 PROCEDURE — 93005 ELECTROCARDIOGRAM TRACING: CPT

## 2025-01-09 RX ORDER — HYDRALAZINE HYDROCHLORIDE 10 MG/1
10 TABLET, FILM COATED ORAL EVERY 8 HOURS SCHEDULED
Status: DISCONTINUED | OUTPATIENT
Start: 2025-01-09 | End: 2025-01-13 | Stop reason: HOSPADM

## 2025-01-09 RX ORDER — HYOSCYAMINE SULFATE 0.38 MG/1
375 TABLET, EXTENDED RELEASE ORAL ONCE AS NEEDED
Status: COMPLETED | OUTPATIENT
Start: 2025-01-09 | End: 2025-01-09

## 2025-01-09 RX ORDER — CALCIUM GLUCONATE 20 MG/ML
2000 INJECTION, SOLUTION INTRAVENOUS ONCE
Status: COMPLETED | OUTPATIENT
Start: 2025-01-09 | End: 2025-01-09

## 2025-01-09 RX ORDER — LACTULOSE 10 G/15ML
30 SOLUTION ORAL 3 TIMES DAILY
Status: DISCONTINUED | OUTPATIENT
Start: 2025-01-09 | End: 2025-01-13 | Stop reason: HOSPADM

## 2025-01-09 RX ORDER — ISOSORBIDE DINITRATE 10 MG/1
10 TABLET ORAL
Status: DISCONTINUED | OUTPATIENT
Start: 2025-01-09 | End: 2025-01-13 | Stop reason: HOSPADM

## 2025-01-09 RX ADMIN — HYDRALAZINE HYDROCHLORIDE 10 MG: 10 TABLET ORAL at 14:11

## 2025-01-09 RX ADMIN — INSULIN LISPRO 6 UNITS: 100 INJECTION, SOLUTION INTRAVENOUS; SUBCUTANEOUS at 08:27

## 2025-01-09 RX ADMIN — SODIUM ZIRCONIUM CYCLOSILICATE 10 G: 10 POWDER, FOR SUSPENSION ORAL at 04:46

## 2025-01-09 RX ADMIN — BUSPIRONE HYDROCHLORIDE 10 MG: 10 TABLET ORAL at 03:08

## 2025-01-09 RX ADMIN — HYDRALAZINE HYDROCHLORIDE 10 MG: 10 TABLET ORAL at 20:51

## 2025-01-09 RX ADMIN — HYDROMORPHONE HYDROCHLORIDE 1 MG: 1 INJECTION, SOLUTION INTRAMUSCULAR; INTRAVENOUS; SUBCUTANEOUS at 01:00

## 2025-01-09 RX ADMIN — APIXABAN 5 MG: 5 TABLET, FILM COATED ORAL at 20:51

## 2025-01-09 RX ADMIN — DONEPEZIL HYDROCHLORIDE 10 MG: 10 TABLET, FILM COATED ORAL at 20:51

## 2025-01-09 RX ADMIN — BISACODYL 10 MG: 10 SUPPOSITORY RECTAL at 03:28

## 2025-01-09 RX ADMIN — ACETAMINOPHEN 650 MG: 325 TABLET ORAL at 03:08

## 2025-01-09 RX ADMIN — METOPROLOL TARTRATE 25 MG: 25 TABLET, FILM COATED ORAL at 20:51

## 2025-01-09 RX ADMIN — INSULIN LISPRO 7 UNITS: 100 INJECTION, SOLUTION INTRAVENOUS; SUBCUTANEOUS at 19:41

## 2025-01-09 RX ADMIN — LACTULOSE 30 G: 20 SOLUTION ORAL at 11:40

## 2025-01-09 RX ADMIN — ONDANSETRON 4 MG: 2 INJECTION INTRAMUSCULAR; INTRAVENOUS at 01:34

## 2025-01-09 RX ADMIN — INSULIN LISPRO 4 UNITS: 100 INJECTION, SOLUTION INTRAVENOUS; SUBCUTANEOUS at 17:33

## 2025-01-09 RX ADMIN — OXYCODONE HYDROCHLORIDE 10 MG: 5 TABLET ORAL at 20:51

## 2025-01-09 RX ADMIN — ISOSORBIDE DINITRATE 10 MG: 10 TABLET ORAL at 14:11

## 2025-01-09 RX ADMIN — MORPHINE SULFATE 2 MG: 2 INJECTION, SOLUTION INTRAMUSCULAR; INTRAVENOUS at 08:36

## 2025-01-09 RX ADMIN — BUMETANIDE 1 MG: 0.25 INJECTION INTRAMUSCULAR; INTRAVENOUS at 17:34

## 2025-01-09 RX ADMIN — Medication 10 ML: at 20:52

## 2025-01-09 RX ADMIN — HYOSCYAMINE SULFATE 375 MCG: 0.38 TABLET, EXTENDED RELEASE ORAL at 02:42

## 2025-01-09 RX ADMIN — MORPHINE SULFATE 2 MG: 2 INJECTION, SOLUTION INTRAMUSCULAR; INTRAVENOUS at 00:16

## 2025-01-09 RX ADMIN — HYDROMORPHONE HYDROCHLORIDE 1 MG: 1 INJECTION, SOLUTION INTRAMUSCULAR; INTRAVENOUS; SUBCUTANEOUS at 03:57

## 2025-01-09 RX ADMIN — ONDANSETRON 4 MG: 2 INJECTION INTRAMUSCULAR; INTRAVENOUS at 08:27

## 2025-01-09 RX ADMIN — APIXABAN 5 MG: 5 TABLET, FILM COATED ORAL at 08:27

## 2025-01-09 RX ADMIN — Medication 10 ML: at 08:28

## 2025-01-09 RX ADMIN — INSULIN HUMAN 5 UNITS: 100 INJECTION, SOLUTION PARENTERAL at 04:47

## 2025-01-09 RX ADMIN — INSULIN LISPRO 4 UNITS: 100 INJECTION, SOLUTION INTRAVENOUS; SUBCUTANEOUS at 11:40

## 2025-01-09 RX ADMIN — BUMETANIDE 1 MG: 0.25 INJECTION INTRAMUSCULAR; INTRAVENOUS at 06:47

## 2025-01-09 RX ADMIN — ISOSORBIDE DINITRATE 10 MG: 10 TABLET ORAL at 17:33

## 2025-01-09 RX ADMIN — POLYETHYLENE GLYCOL 3350 17 G: 17 POWDER, FOR SOLUTION ORAL at 00:18

## 2025-01-09 RX ADMIN — PANTOPRAZOLE SODIUM 40 MG: 40 TABLET, DELAYED RELEASE ORAL at 20:51

## 2025-01-09 RX ADMIN — PANTOPRAZOLE SODIUM 40 MG: 40 TABLET, DELAYED RELEASE ORAL at 08:27

## 2025-01-09 RX ADMIN — BUSPIRONE HYDROCHLORIDE 10 MG: 10 TABLET ORAL at 20:51

## 2025-01-09 RX ADMIN — LACTULOSE 30 G: 20 SOLUTION ORAL at 17:33

## 2025-01-09 RX ADMIN — CALCIUM GLUCONATE 2000 MG: 20 INJECTION, SOLUTION INTRAVENOUS at 04:47

## 2025-01-09 RX ADMIN — METOPROLOL TARTRATE 25 MG: 25 TABLET, FILM COATED ORAL at 08:27

## 2025-01-09 RX ADMIN — CALCITRIOL CAPSULES 0.25 MCG 0.25 MCG: 0.25 CAPSULE ORAL at 08:27

## 2025-01-09 NOTE — SIGNIFICANT NOTE
I was called on this patient for having severe abdominal pain not responding to morphine or Dilaudid.  Patient seen and examined.  Abdomen is very soft and nontender.  Patient was walking in the room complaining of pain, stating that he is not able to lie down as pain will increase.  With this pain, I placed orders for EKG, troponin, CT abdomen and pelvis without contrast, lipase, CBC and CMP.  I also added hyoscyamine.  Will continue to reassess.    Update:  CT abdomen pelvis done and reported but did not explain patient's symptoms.  However showing pleural effusions.  Labs did not explain patient's symptoms either.  However showing hyperkalemia.  Placing some stat orders for hyperkalemia.  Repeating BMP in the morning.

## 2025-01-09 NOTE — CONSULTS
Inpatient Consult Note            NAME: Erick Luong  MRN: [id number]  AGE: 69 y.o.            : 1955  ADMISSION DATE: 2025  PRIMARY CARE PROVIDER: Del Shetty MD  ATTENDING PHYSICIAN: Latanya Paez MD               Reason for Consult:  We have been consulted by Latanya Paez MD to see Erick Luong for COPD.    HPI:    Mr. Luong is a 69-year-old male admitted [facility] with acute exacerbation of CHF and shortness of breath.  Past medical history includes CHF, BRITTNI.    Patient presented to the ED on 2025 with chief complaint of shortness of breath.  He was also noted to be in A-fib with RVR.  He was admitted with a cardiology and nephrology consult.  The patient is undergoing diuresis with good urine output.  With diuresis his respiratory status has improved.  Today he is resting in the bedside chair breathing comfortably on room air.  Pulmonology consulted for concern for COPD.    The patient denies any history of asthma, COPD, emphysema.  He does use albuterol at home as needed.  Has never required supplemental oxygen at home.  He is a lifetime non-smoker.  He has been seen by pulmonology in the past and undergone PFTs however this was many years ago.    Review of Systems:  A full 12-point review of systems was performed and was negative except as documented in the HPI.               Social History:  Smoking: Non-smoker  Occupation: Retired   No history of exposure to industrial dusts, fumes, or asbestos.            Physical Exam:  General: Well appearing, in no respiratory distress  Head: Normocephalic, atraumatic  Eyes: Conjunctiva clear, sclera non-icteric  Teeth/Gums: Normal inspection  Neck: Supple without stridor  Heart: Regular rate and rhythm, no murmur  Lungs: Diminished bilaterally, no wheezing  Abdomen: Soft, nontender  MSK/extremities: No cyanosis or edema  Neurologic: AOx3, grossly no focal deficits      Imaging Review:  I personally reviewed the chest  x-ray and CT abdomen pelvis done on admission:  Imaging consistent with pulmonary edema including bilateral small pleural effusions.      PFTs Reivew:  None available for review            Problem List:  CHF with acute exacerbation  Dyspnea on exertion  Acute hypoxic respiratory failure, resolved  Bilateral pleural effusions      Pulmonary Assessment:  Mr. Luong is a 69-year-old male admitted with dyspnea from CHF exacerbation.  He is being diuresed with good urine output.  His respiratory status has improved.  He was on room air this morning.      Recommendations:   -Continue supplemental oxygen as needed, he is currently on room air today  -Diuresis per cardiology and nephrology  -Frequent incentive spirometer and out of bed and up to chair  -He will need pulmonary clinic follow-up with PFTs, we will arrange for both of these        Thank you for this consult and allowing us to evaluate the patient in the hospital and arrange follow-up.  He will need pulmonary clinic follow-up with PFTs.  Pulmonology will sign off at this time and see him in clinic after discharge.              Past Medical History:   Past Medical History:   Diagnosis Date    Arthritis     Autonomic disease     CAD (coronary artery disease) 02/06/2017    Cervical radiculopathy 09/16/2021    Chronic constipation with acute exaccerbation 05/10/2021    Coronary artery disease     Degeneration of cervical intervertebral disc 08/11/2021    Diabetes mellitus     Diabetic foot ulcer 08/31/2020    Diabetic polyneuropathy associated with type 2 diabetes mellitus 01/18/2021    Elevated cholesterol     Gastroesophageal reflux disease 05/13/2019    Headache     HTN (hypertension), benign 05/03/2017    Hyperlipidemia     Hypertension     Mixed hyperlipidemia 02/07/2017    Multiple lung nodules 01/26/2020    5mm, 9 mm RLL identified 1/2020, not present 10/2019.    Myocardial infarction     Osteomyelitis 01/22/2020    Osteomyelitis of fifth toe of right foot  10/07/2019    Pancreatitis     Persistent insomnia 01/20/2020    Renal disorder     Sleep apnea 02/06/2017    Sleep apnea with use of continuous positive airway pressure (CPAP)     NON-COMPLIANT    Slow transit constipation 01/16/2019    Spinal stenosis in cervical region 09/16/2021    Vitamin D deficiency 03/02/2021         Past Surgical History:   Past Surgical History:   Procedure Laterality Date    ABDOMINAL SURGERY      AMPUTATION FOOT / TOE Left 10/2021    5th digit     ANTERIOR CERVICAL DISCECTOMY W/ FUSION N/A 08/05/2022    Procedure: CERVICAL DISCECTOMY ANTERIOR WITH FUSION C5-6 with possible plating of C5-7 with neuromonitoring and 1 c-arm;  Surgeon: Karel Soliz MD;  Location: [facility] OR;  Service: Neurosurgery;  Laterality: N/A;    APPENDECTOMY      BACK SURGERY      CARDIAC CATHETERIZATION Left 02/08/2021    Procedure: Left Heart Cath w poss intervention left anatomical snuff box acess;  Surgeon: Omkar Charles DO;  Location: [facility] CATH INVASIVE LOCATION;  Service: Cardiology;  Laterality: Left;    CARDIAC SURGERY      CATARACT EXTRACTION      CERVICAL SPINE SURGERY      COLONOSCOPY N/A 01/31/2017    Normal exam repeat in 5 years    COLONOSCOPY N/A 02/11/2019    Mild acute inflammation    COLONOSCOPY N/A 04/07/2024    2 areas at 10 and 20 cm with friability ulceration 2 clips placed at 20 cm and 4 clips at 10 cm poor prep normal mucosa,mild eroisions and ulcerations in visible vessels    COLONOSCOPY N/A 7/1/2024    Procedure: COLONOSCOPY WITH ANESTHESIA;  Surgeon: Arsalan Lorenzo DO;  Location: [facility] ENDOSCOPY;  Service: Gastroenterology;  Laterality: N/A;  pre op constipation/diarrhea  post poor prep  pcp Del Shetty    COLONOSCOPY N/A 7/2/2024    Procedure: COLONOSCOPY WITH ANESTHESIA;  Surgeon: Agapito Christopher MD;  Location: [facility] ENDOSCOPY;  Service: Gastroenterology;  Laterality: N/A;  pre rectal bleeding  post poor prep  pcp Del Shetty MD    COLONOSCOPY W/  POLYPECTOMY  03/04/2014    Hyperplastic polyp    CORONARY ARTERY BYPASS GRAFT  10/2015    ENDOSCOPY  04/13/2011    Gastritis with hemorrhage    ENDOSCOPY N/A 05/05/2017    Normal exam    ENDOSCOPY N/A 02/11/2019    Gastritis    ENDOSCOPY N/A 09/01/2020    Non-erosive gastritis with hemorrhage    ENDOSCOPY N/A 02/10/2021    Esophagitis    ENDOSCOPY N/A 04/11/2024    There were esophageal mucosal changes suspicious for short-segment Low's esophagus present in the distal esophagus. The maximum longitudinal extent of these mucosal changes was 2 cm in length. Mucosa was biopsied with a cold forceps for histologyDistal esophagus, biopsies: Mild chronic active esophagogastritis. No evidence of intestinal metaplasia, dysplasia. Antrum, bx, Mild chronic gastritis    FOOT SURGERY Left     INCISION AND DRAINAGE OF WOUND Left 09/2007    spider bite         Family History:   Family History   Problem Relation Age of Onset    Colon cancer Father     Heart disease Father     Colon cancer Sister     Colon polyps Sister     Alzheimer's disease Mother     Coronary artery disease Sister     Coronary artery disease Sister          Medications:   Prior to Admission medications    Medication Sig Start Date End Date Taking? Authorizing Provider   apixaban (Eliquis) 5 MG tablet tablet Take 1 tablet by mouth 2 (Two) Times a Day. 10/17/24  Yes    ascorbic acid (VITAMIN C) 1000 MG tablet Take 1 tablet by mouth Daily. 8/25/23  Yes    busPIRone (BUSPAR) 10 MG tablet Take 1 tablet by mouth 3 (Three) Times a Day As Needed (anxiety). 12/1/24  Yes Germania Sheridan MD   donepezil (ARICEPT) 10 MG tablet Take 1 tablet by mouth every night at bedtime. 10/17/24  Yes    Insulin Glargine (Lantus SoloStar) 100 UNIT/ML injection pen Inject 20 units nightly as directed  Patient taking differently: Inject 20 Units under the skin into the appropriate area as directed Every Night.   Patient stated that he is still taking 35 units at bedtime 1/3/25  Yes     oxyCODONE (ROXICODONE) 10 MG tablet Take 1 tablet by mouth Every 12 (Twelve) Hours As Needed for Moderate Pain.   Yes Bossman Blount MD   pantoprazole (PROTONIX) 40 MG EC tablet Take 1 tablet by mouth 2 (Two) Times a Day. 1/3/25  Yes    potassium chloride ER (K-TAB) 20 MEQ tablet controlled-release ER tablet Take 1 tablet by mouth Daily. 10/17/24  Yes    rosuvastatin (CRESTOR) 10 MG tablet Take 1 tablet by mouth every night at bedtime. 1/3/25  Yes    sennosides-docusate (PERICOLACE) 8.6-50 MG per tablet Take 1 tablet by mouth Every Night. Obtain OTC 8/23/23  Yes Lexie Houston APRN   sertraline (ZOLOFT) 25 MG tablet Take 1 tablet by mouth Daily.   Yes Bossman Blount MD   sodium hypochlorite (DAKIN'S 1/4 STRENGTH) 0.125 % solution topical solution 0.125% Apply  topically to the appropriate area as directed 2 (Two) Times a Day. 10/28/24  Yes    tamsulosin (FLOMAX) 0.4 MG capsule 24 hr capsule Take 1 capsule by mouth Daily. 10/29/24  Yes    bumetanide (BUMEX) 2 MG tablet Take 1 tablet by mouth 2 (Two) Times a Day. 1/6/25   Bossman Blount MD   docusate sodium 100 MG capsule Take 1 capsule by mouth Daily. 1/6/25   Bossman Blount MD   famotidine (PEPCID) 20 MG tablet Take 1 tablet by mouth 2 (Two) Times a Day. 10/17/24      Insulin Lispro (humaLOG) 100 UNIT/ML injection Inject 2-12 Units under the skin into the appropriate area as directed 4 (Four) Times a Day Before Meals & at Bedtime. Inject subcutaneously four times a day per sliding scale:  0-150 = 0 units; 151-200 = 2u; 201-250 = 4u; 251-300 = 6u; 301-350 = 8u; 351-400 = 10u; 401+ = 12u    Bossman Blount MD   ipratropium-albuterol (DUO-NEB) 0.5-2.5 mg/3 ml nebulizer Take 3 mL by nebulization 4 (Four) Times a Day As Needed.  Patient taking differently: Take 3 mL by nebulization 4 (Four) Times a Day As Needed for Wheezing or Shortness of Air. 12/3/24      lisinopril (PRINIVIL,ZESTRIL) 5 MG tablet Take 1 tablet by mouth Daily. 1/3/25    "   melatonin 3 MG tablet Take 2 tablets by mouth At Night As Needed for Sleep. 4/11/24   Rene Maloney MD   melatonin 3 MG tablet Take 1 tablet by mouth Daily. 1/3/25      nitroglycerin (NITROSTAT) 0.4 MG SL tablet Place 1 tablet as needed under the tongue every 5 minutes. If no improvement after 3 tablets go to ER  Patient taking differently: Place 1 tablet under the tongue As Needed for Chest Pain. 1/3/25      polyethylene glycol (MIRALAX) 17 GM/SCOOP powder Take 17 g by mouth Daily As Needed (constipation).    Provider, MD Bossman   pregabalin (LYRICA) 100 MG capsule Take 1 capsule by mouth 3 (Three) Times a Day.    Provider, MD Bossman   spironolactone (ALDACTONE) 25 MG tablet Take 1 tablet by mouth Daily. 9/28/20 12/31/20  Del Shetty MD         Allergies:   Cefepime, Bactrim [sulfamethoxazole-trimethoprim], Vancomycin, Zolpidem, and Metronidazole      Results Review:   Results from last 7 days   Lab Units 01/09/25  0505 01/09/25  0345 01/08/25  0014   SODIUM mmol/L 137 137 139   POTASSIUM mmol/L 5.0 6.0* 5.3*   CHLORIDE mmol/L 102 100 104   CO2 mmol/L 19.0* 21.0* 23.0   BUN mg/dL 63* 62* 62*   CALCIUM mg/dL 8.9 9.1 8.6     Results from last 7 days   Lab Units 01/09/25  0345 01/08/25  0014 01/07/25  0430   WBC 10*3/mm3 9.04 6.70 7.14   HEMOGLOBIN g/dL 9.2* 8.3* 8.4*   HEMATOCRIT % 29.9* 26.4* 26.7*   PLATELETS 10*3/mm3 380 276 250       Visit Vitals  /55 (BP Location: Left arm, Patient Position: Sitting)   Pulse 68   Temp 97.4 °F (36.3 °C) (Axillary)   Resp 18   Ht 182.9 cm (72\")   Wt 128 kg (282 lb)   SpO2 98%   BMI 38.25 kg/m²                Lisa Charles DO  Pulmonary/Critical Care  1/9/2025  08:48 CST  "

## 2025-01-09 NOTE — PROGRESS NOTES
"[facility] -  Progress Note     LOS: 1 day   Patient Care Team:  Del Shetty MD as PCP - General (Family Medicine)  Emeka Garay MD as Cardiologist (Cardiology)  Cristopher Hannah MD as Consulting Physician (Gastroenterology)  Brian Lomas MD as Consulting Physician (Nephrology)  Karel Soliz MD as Surgeon (Neurosurgery)  Willy Romero APRN as Nurse Practitioner (Nurse Practitioner)  Dougie Taylor MD as Consulting Physician (Pulmonary Disease)    Chief Complaint:  CHF follow up     Subjective     Interval History: Sitting up in the chair sleeping upon entry into the room.  No family is with him currently.  When asked how he is doing today he says, \" terrible\".  When asked what is wrong he responds, \"I just feel bad, real real bad.\"  When asked what symptoms he has he answers, \"just feel bad.\"  He will not provide any detail or specific complaint. I then asked him if he was dizzy he stated \"yes\", weak? \"Yes\" nauseated? \"Yes\" abdominal pain? \"Yes\" chest pain? \" Chest pain is not as bad today.\"  When I asked him again, what was bothering him the most, he replied, \"I just feel real bad.\"        Review of Systems:     Review of Systems   Cardiovascular:  Positive for chest pain.   Gastrointestinal:  Positive for abdominal pain and nausea.   Neurological:  Positive for dizziness and weakness.     Objective     Vital Sign Min/Max for last 24 hours  Temp  Min: 97.4 °F (36.3 °C)  Max: 98 °F (36.7 °C)   BP  Min: 106/58  Max: 138/74   Pulse  Min: 64  Max: 96   Resp  Min: 18  Max: 22   SpO2  Min: 92 %  Max: 100 %   No data recorded   Weight  Min: 128 kg (282 lb)  Max: 128 kg (282 lb)         01/08/25  1505   Weight: 128 kg (282 lb)       Physical Exam:    Vitals reviewed.   Constitutional:       General: Not in acute distress.     Appearance: Well-developed, well-groomed and not in distress. Obese. Chronically ill-appearing.   HENT:      Head: Normocephalic and " atraumatic.   Pulmonary:      Effort: Pulmonary effort is normal.      Breath sounds: Normal breath sounds.   Cardiovascular:      Normal rate. Irregularly irregular rhythm.      Murmurs: There is no murmur.      No gallop.  No rub.   Edema:     Peripheral edema absent.   Musculoskeletal:      Cervical back: Normal range of motion and neck supple. Skin:     General: Skin is warm and dry.   Neurological:      Mental Status: Alert.   Psychiatric:         Attention and Perception: Attention normal.         Mood and Affect: Mood normal.         Speech: Speech normal.         Behavior: Behavior is cooperative.       Results Review:   Lab Results (last 72 hours)      Lab Results   Component Value Date    GLUCOSE 277 (H) 01/09/2025    CALCIUM 8.9 01/09/2025     01/09/2025    K 5.0 01/09/2025    CO2 19.0 (L) 01/09/2025     01/09/2025    BUN 63 (H) 01/09/2025    CREATININE 2.68 (H) 01/09/2025    EGFR 25.1 (L) 01/09/2025    BCR 23.5 01/09/2025    ANIONGAP 16.0 (H) 01/09/2025     Lab Results   Component Value Date    WBC 9.04 01/09/2025    HGB 9.2 (L) 01/09/2025    HCT 29.9 (L) 01/09/2025    MCV 91.4 01/09/2025     01/09/2025     Lab Results   Component Value Date    CHOL 138 11/19/2024    CHOL 168 07/25/2024    CHOL 143 03/27/2024    CHLPL 133 06/16/2016    CHLPL 142 03/25/2016    CHLPL 229 (H) 12/16/2015     Lab Results   Component Value Date    TRIG 104 11/19/2024    TRIG 69 07/25/2024    TRIG 106 03/27/2024     Lab Results   Component Value Date    HDL 39 (L) 11/19/2024    HDL 57 07/25/2024    HDL 48 03/27/2024     Lab Results   Component Value Date    LDL 80 11/19/2024    LDL 98 07/25/2024    LDL 76 03/27/2024     Lab Results   Component Value Date    HGBA1C 8.60 (H) 12/08/2024            Medication Review: yes  Current Facility-Administered Medications   Medication Dose Route Frequency Provider Last Rate Last Admin    acetaminophen (TYLENOL) tablet 650 mg  650 mg Oral Q4H PRN Latanya Paez MD   650  mg at 01/09/25 0308    Or    acetaminophen (TYLENOL) 160 MG/5ML oral solution 650 mg  650 mg Oral Q4H PRN Latanya Paez MD        Or    acetaminophen (TYLENOL) suppository 650 mg  650 mg Rectal Q4H PRN Latanya Paez MD        apixaban (ELIQUIS) tablet 5 mg  5 mg Oral BID Latanya Paez MD   5 mg at 01/09/25 0827    sennosides-docusate (PERICOLACE) 8.6-50 MG per tablet 2 tablet  2 tablet Oral BID PRN Latanya Paez MD   2 tablet at 01/08/25 2021    And    polyethylene glycol (MIRALAX) packet 17 g  17 g Oral Daily PRN Latanya Paez MD   17 g at 01/09/25 0018    And    bisacodyl (DULCOLAX) EC tablet 5 mg  5 mg Oral Daily PRN Latanya Paez MD        And    bisacodyl (DULCOLAX) suppository 10 mg  10 mg Rectal Daily PRN Latanya Paez MD   10 mg at 01/09/25 0328    bumetanide (BUMEX) injection 1 mg  1 mg Intravenous Q12H Latanya Paez MD   1 mg at 01/09/25 0647    busPIRone (BUSPAR) tablet 10 mg  10 mg Oral TID PRN Latanya Paez MD   10 mg at 01/09/25 0308    calcitriol (ROCALTROL) capsule 0.25 mcg  0.25 mcg Oral Daily Vinny Hartley MD   0.25 mcg at 01/09/25 0827    Calcium Replacement - Follow Nurse / BPA Driven Protocol   Not Applicable PRN Latanya Paez MD        dextrose (D50W) (25 g/50 mL) IV injection 25 g  25 g Intravenous Q15 Min PRN Latanya Paez MD        dextrose (GLUTOSE) oral gel 15 g  15 g Oral Q15 Min PRN Latanya Paez MD        donepezil (ARICEPT) tablet 10 mg  10 mg Oral Nightly Latanya Paez MD   10 mg at 01/08/25 2021    glucagon (GLUCAGEN) injection 1 mg  1 mg Intramuscular Q15 Min PRN Latanya Paez MD        Insulin Lispro (humaLOG) injection 2-9 Units  2-9 Units Subcutaneous 4x Daily AC & at Bedtime Latanya Paez MD   6 Units at 01/09/25 0827    ipratropium-albuterol (DUO-NEB) nebulizer solution 3 mL  3 mL Nebulization 4x Daily PRN Latanya Paez MD        lactulose solution 30 g  30 g Oral TID Latanya Paez MD        Magnesium Standard Dose Replacement -  Follow Nurse / BPA Driven Protocol   Not Applicable PRLatanya Cid MD        metoprolol tartrate (LOPRESSOR) tablet 25 mg  25 mg Oral Q12H Emeka Garay MD   25 mg at 01/09/25 0827    Morphine sulfate (PF) injection 2 mg  2 mg Intravenous Q4H PRN Latanya Paez MD   2 mg at 01/09/25 0836    And    naloxone (NARCAN) injection 0.4 mg  0.4 mg Intravenous Q5 Min PRN Latanya Paez MD        nitroglycerin (NITROSTAT) SL tablet 0.4 mg  0.4 mg Sublingual Q5 Min PRN Latanya Paez MD   0.4 mg at 01/07/25 2353    ondansetron (ZOFRAN) injection 4 mg  4 mg Intravenous Q6H PRN Latanya Paez MD   4 mg at 01/09/25 0827    oxyCODONE (ROXICODONE) immediate release tablet 10 mg  10 mg Oral Q12H PRN Latanya Paez MD   10 mg at 01/08/25 2023    pantoprazole (PROTONIX) EC tablet 40 mg  40 mg Oral BID Latanya Paez MD   40 mg at 01/09/25 0827    Phosphorus Replacement - Follow Nurse / BPA Driven Protocol   Not Applicable Latanya Oropeza MD        Potassium Replacement - Follow Nurse / BPA Driven Protocol   Not Applicable Latanya Oropeza MD        sodium chloride 0.9 % flush 10 mL  10 mL Intravenous PRN Karel Molina Jr., MD        sodium chloride 0.9 % flush 10 mL  10 mL Intravenous Q12H Latanya Paez MD   10 mL at 01/09/25 0828    sodium chloride 0.9 % flush 10 mL  10 mL Intravenous PRN Latanya Paez MD        sodium chloride 0.9 % infusion 40 mL  40 mL Intravenous PRN Latanya Paez MD         Results for orders placed during the hospital encounter of 01/06/25    Adult Transthoracic Echo Complete W/ Cont if Necessary Per Protocol    Interpretation Summary    Left ventricular systolic function is moderately decreased. Left ventricular ejection fraction appears to be 36 - 40%.    Left ventricular wall thickness is consistent with mild concentric hypertrophy.    Normal right ventricular cavity size noted. Mildly reduced right ventricular systolic function noted.    No significant valvular  abnormalities identified on this study.        Assessment & Plan     .  Permanent atrial fibrillation, rapid ventricular response this admission with improved rate control  2.  Acute systolic congestive heart failure: Echo this admission shows a new decline in ejection fraction compared to previous.  3.  Acute on chronic renal insufficiency  4.  Coronary artery disease: Status post prior coronary artery bypass grafting  5.  Hypertension  6.  Hyperlipidemia: LDL goal less than 70  7.  Type 2 diabetes mellitus  8.  Untreated obstructive sleep apnea  9.  Anemia: Stable  10.  Morbid obesity: BMI 38.25 comorbid conditions include coronary artery disease, congestive heart failure      On room air without obvious increased shortness of breath. He has no acute complaints when asked.   Due to findings on echo, we have adjusted medications for heart failure management.   He is currently on IV Bumex. He has had hyperkalemia.  Renal function is still elevated - avoid ACE ARB ARNI at this time due to renal function and recent hyperkalemia.   Lopressor has been started, dilt discontinued. Needs to transition to Toprol XL prior to discharge, titrate as tolerated.   Start isordil/hydralazine for optimization of CHF medications with drop in EF, renal insufficiency, and recent hyperkalemia.   Daily weights  Strict I/O  Low K Low NA Diet - updated.   BMP in am.  CHF clinic follow up at discharge.       Electronically signed by SUSAN Santos, 01/09/25, 11:36 AM CST.

## 2025-01-09 NOTE — PROGRESS NOTES
Nephrology ([facility]) progress Note      Patient:  Erick Luong  YOB: 1955  Date of Service: 1/9/2025  MRN: [id number]   Acct: [id number]   Primary Care Physician: Del Shetty MD  Advance Directive:   Code Status and Medical Interventions: CPR (Attempt to Resuscitate); Full Support   Ordered at: 01/06/25 1800     Code Status (Patient has no pulse and is not breathing):    CPR (Attempt to Resuscitate)     Medical Interventions (Patient has pulse or is breathing):    Full Support     Admit Date: 1/6/2025       Hospital Day: 1  Referring Provider: Latanya Paez MD      Patient personally seen and examined.  Complete chart including Consults, Notes, Operative Reports, Labs, Cardiology, and Radiology studies reviewed as able.        Subjective:  Erick Luong is a 69 y.o. male for whom we were consulted for evaluation and treatment of acute kidney injury. Baseline chronic kidney disease stage 3b, baseline creatinine approximately 1.8.  Follows with Dr Lomas in our office. History of poorly controlled dialysis, hypertension, coronary artery disease, chronic diastolic CHF, BRITTNI, diabetic foot ulcer, obesity. On 1/06 was sent to ER from PCP office iwht rapid heart rate and dyspnea. In ER labs revealed creatinine 3.0. Noted to be in atrial fibrillation with RVR. Received IV Bumex and was admitted to medical floor. Currently is awake and alert. No dyspnea or pain at rest. Gets short of breath with any activity. Denies n/v/d. Urine output nonoliguric. Today he was doing better. Has good urine output, has no significant leg edema. Still poor appetite.     Allergies:  Cefepime, Bactrim [sulfamethoxazole-trimethoprim], Vancomycin, Zolpidem, and Metronidazole    Home Meds:  Medications Prior to Admission   Medication Sig Dispense Refill Last Dose/Taking    apixaban (Eliquis) 5 MG tablet tablet Take 1 tablet by mouth 2 (Two) Times a Day. 60 tablet 0 Taking    ascorbic acid (VITAMIN C)  1000 MG tablet Take 1 tablet by mouth Daily. 30 tablet 3 Taking    busPIRone (BUSPAR) 10 MG tablet Take 1 tablet by mouth 3 (Three) Times a Day As Needed (anxiety).   1/6/2025    donepezil (ARICEPT) 10 MG tablet Take 1 tablet by mouth every night at bedtime. 30 tablet 0 Taking    Insulin Glargine (Lantus SoloStar) 100 UNIT/ML injection pen Inject 20 units nightly as directed (Patient taking differently: Inject 20 Units under the skin into the appropriate area as directed Every Night.   Patient stated that he is still taking 35 units at bedtime) 15 mL 3 Taking Differently    oxyCODONE (ROXICODONE) 10 MG tablet Take 1 tablet by mouth Every 12 (Twelve) Hours As Needed for Moderate Pain.   Taking As Needed    pantoprazole (PROTONIX) 40 MG EC tablet Take 1 tablet by mouth 2 (Two) Times a Day. 90 tablet 4 Taking    potassium chloride ER (K-TAB) 20 MEQ tablet controlled-release ER tablet Take 1 tablet by mouth Daily. 30 tablet 0 Taking    rosuvastatin (CRESTOR) 10 MG tablet Take 1 tablet by mouth every night at bedtime. 30 tablet 2 Taking    sennosides-docusate (PERICOLACE) 8.6-50 MG per tablet Take 1 tablet by mouth Every Night. Obtain OTC   Taking    sertraline (ZOLOFT) 25 MG tablet Take 1 tablet by mouth Daily.   Taking    sodium hypochlorite (DAKIN'S 1/4 STRENGTH) 0.125 % solution topical solution 0.125% Apply  topically to the appropriate area as directed 2 (Two) Times a Day. 473 mL 5 Taking    tamsulosin (FLOMAX) 0.4 MG capsule 24 hr capsule Take 1 capsule by mouth Daily. 90 capsule 4 Taking    bumetanide (BUMEX) 2 MG tablet Take 1 tablet by mouth 2 (Two) Times a Day.       docusate sodium 100 MG capsule Take 1 capsule by mouth Daily.       famotidine (PEPCID) 20 MG tablet Take 1 tablet by mouth 2 (Two) Times a Day. 60 tablet 0 Unknown    Insulin Lispro (humaLOG) 100 UNIT/ML injection Inject 2-12 Units under the skin into the appropriate area as directed 4 (Four) Times a Day Before Meals & at Bedtime. Inject  subcutaneously four times a day per sliding scale:  0-150 = 0 units; 151-200 = 2u; 201-250 = 4u; 251-300 = 6u; 301-350 = 8u; 351-400 = 10u; 401+ = 12u       ipratropium-albuterol (DUO-NEB) 0.5-2.5 mg/3 ml nebulizer Take 3 mL by nebulization 4 (Four) Times a Day As Needed. (Patient taking differently: Take 3 mL by nebulization 4 (Four) Times a Day As Needed for Wheezing or Shortness of Air.) 360 mL 3     lisinopril (PRINIVIL,ZESTRIL) 5 MG tablet Take 1 tablet by mouth Daily. 30 tablet 0 Unknown    melatonin 3 MG tablet Take 2 tablets by mouth At Night As Needed for Sleep. 30 tablet 0     melatonin 3 MG tablet Take 1 tablet by mouth Daily. 30 tablet 0 Unknown    nitroglycerin (NITROSTAT) 0.4 MG SL tablet Place 1 tablet as needed under the tongue every 5 minutes. If no improvement after 3 tablets go to ER (Patient taking differently: Place 1 tablet under the tongue As Needed for Chest Pain.) 25 tablet 0     polyethylene glycol (MIRALAX) 17 GM/SCOOP powder Take 17 g by mouth Daily As Needed (constipation).   Unknown    pregabalin (LYRICA) 100 MG capsule Take 1 capsule by mouth 3 (Three) Times a Day.          Medicines:  Current Facility-Administered Medications   Medication Dose Route Frequency Provider Last Rate Last Admin    acetaminophen (TYLENOL) tablet 650 mg  650 mg Oral Q4H PRN Latanya Paez MD   650 mg at 01/09/25 0308    Or    acetaminophen (TYLENOL) 160 MG/5ML oral solution 650 mg  650 mg Oral Q4H PRN Latanya Paez MD        Or    acetaminophen (TYLENOL) suppository 650 mg  650 mg Rectal Q4H PRN Latanya Paez MD        apixaban (ELIQUIS) tablet 5 mg  5 mg Oral BID Latanya Paez MD   5 mg at 01/09/25 0827    sennosides-docusate (PERICOLACE) 8.6-50 MG per tablet 2 tablet  2 tablet Oral BID PRN Latanya Paez MD   2 tablet at 01/08/25 2021    And    polyethylene glycol (MIRALAX) packet 17 g  17 g Oral Daily PRN Latanya Paez MD   17 g at 01/09/25 0018    And    bisacodyl (DULCOLAX) EC tablet 5 mg  5 mg  Oral Daily PRN Latanya Paez MD        And    bisacodyl (DULCOLAX) suppository 10 mg  10 mg Rectal Daily PRN Latanya Paez MD   10 mg at 01/09/25 0328    bumetanide (BUMEX) injection 1 mg  1 mg Intravenous Q12H Latanya Paez MD   1 mg at 01/09/25 0647    busPIRone (BUSPAR) tablet 10 mg  10 mg Oral TID PRN Latanya Paez MD   10 mg at 01/09/25 0308    calcitriol (ROCALTROL) capsule 0.25 mcg  0.25 mcg Oral Daily Vinny Hartley MD   0.25 mcg at 01/09/25 0827    Calcium Replacement - Follow Nurse / BPA Driven Protocol   Not Applicable PRN Latanya Paez MD        dextrose (D50W) (25 g/50 mL) IV injection 25 g  25 g Intravenous Q15 Min PRN Latanya Paez MD        dextrose (GLUTOSE) oral gel 15 g  15 g Oral Q15 Min PRN Latanya Paez MD        donepezil (ARICEPT) tablet 10 mg  10 mg Oral Nightly Latanya Paez MD   10 mg at 01/08/25 2021    glucagon (GLUCAGEN) injection 1 mg  1 mg Intramuscular Q15 Min PRN Latanya Paez MD        hydrALAZINE (APRESOLINE) tablet 10 mg  10 mg Oral Q8H Donna Roman APRN   10 mg at 01/09/25 1411    Insulin Lispro (humaLOG) injection 2-9 Units  2-9 Units Subcutaneous 4x Daily AC & at Bedtime Latanya Paez MD   4 Units at 01/09/25 1140    ipratropium-albuterol (DUO-NEB) nebulizer solution 3 mL  3 mL Nebulization 4x Daily PRN Latanya Paez MD        isosorbide dinitrate (ISORDIL) tablet 10 mg  10 mg Oral TID - Nitrates Donna Roman APRN   10 mg at 01/09/25 1411    lactulose solution 30 g  30 g Oral TID Latanya Paez MD   30 g at 01/09/25 1140    Magnesium Standard Dose Replacement - Follow Nurse / BPA Driven Protocol   Not Applicable PRN Latanya Paez MD        metoprolol tartrate (LOPRESSOR) tablet 25 mg  25 mg Oral Q12H Emeka Garay MD   25 mg at 01/09/25 0827    Morphine sulfate (PF) injection 2 mg  2 mg Intravenous Q4H PRN Latanya Paez MD   2 mg at 01/09/25 0836    And    naloxone (NARCAN) injection 0.4 mg  0.4 mg Intravenous Q5 Min PRN  Latanya Paez MD        nitroglycerin (NITROSTAT) SL tablet 0.4 mg  0.4 mg Sublingual Q5 Min PRN Latanya Paez MD   0.4 mg at 01/07/25 2353    ondansetron (ZOFRAN) injection 4 mg  4 mg Intravenous Q6H PRN Latanya Paez MD   4 mg at 01/09/25 0827    oxyCODONE (ROXICODONE) immediate release tablet 10 mg  10 mg Oral Q12H PRN Latanya Paez MD   10 mg at 01/08/25 2023    pantoprazole (PROTONIX) EC tablet 40 mg  40 mg Oral BID Latanya Paez MD   40 mg at 01/09/25 0827    Phosphorus Replacement - Follow Nurse / BPA Driven Protocol   Not Applicable PRN Latanya Paez MD        Potassium Replacement - Follow Nurse / BPA Driven Protocol   Not Applicable PRN Latanya Paez MD        sodium chloride 0.9 % flush 10 mL  10 mL Intravenous PRN Karel Molina Jr., MD        sodium chloride 0.9 % flush 10 mL  10 mL Intravenous Q12H Latanya Paez MD   10 mL at 01/09/25 0828    sodium chloride 0.9 % flush 10 mL  10 mL Intravenous PRN Latanya Paez MD        sodium chloride 0.9 % infusion 40 mL  40 mL Intravenous PRN Latanya Paez MD           Past Medical History:  Past Medical History:   Diagnosis Date    Arthritis     Autonomic disease     CAD (coronary artery disease) 02/06/2017    Cervical radiculopathy 09/16/2021    Chronic constipation with acute exaccerbation 05/10/2021    Coronary artery disease     Degeneration of cervical intervertebral disc 08/11/2021    Diabetes mellitus     Diabetic foot ulcer 08/31/2020    Diabetic polyneuropathy associated with type 2 diabetes mellitus 01/18/2021    Elevated cholesterol     Gastroesophageal reflux disease 05/13/2019    Headache     HTN (hypertension), benign 05/03/2017    Hyperlipidemia     Hypertension     Mixed hyperlipidemia 02/07/2017    Multiple lung nodules 01/26/2020    5mm, 9 mm RLL identified 1/2020, not present 10/2019.    Myocardial infarction     Osteomyelitis 01/22/2020    Osteomyelitis of fifth toe of right foot 10/07/2019    Pancreatitis      Persistent insomnia 01/20/2020    Renal disorder     Sleep apnea 02/06/2017    Sleep apnea with use of continuous positive airway pressure (CPAP)     NON-COMPLIANT    Slow transit constipation 01/16/2019    Spinal stenosis in cervical region 09/16/2021    Vitamin D deficiency 03/02/2021       Past Surgical History:  Past Surgical History:   Procedure Laterality Date    ABDOMINAL SURGERY      AMPUTATION FOOT / TOE Left 10/2021    5th digit     ANTERIOR CERVICAL DISCECTOMY W/ FUSION N/A 08/05/2022    Procedure: CERVICAL DISCECTOMY ANTERIOR WITH FUSION C5-6 with possible plating of C5-7 with neuromonitoring and 1 c-arm;  Surgeon: Karel Soliz MD;  Location: [facility] OR;  Service: Neurosurgery;  Laterality: N/A;    APPENDECTOMY      BACK SURGERY      CARDIAC CATHETERIZATION Left 02/08/2021    Procedure: Left Heart Cath w poss intervention left anatomical snuff box acess;  Surgeon: Omkar Charles DO;  Location: [facility] CATH INVASIVE LOCATION;  Service: Cardiology;  Laterality: Left;    CARDIAC SURGERY      CATARACT EXTRACTION      CERVICAL SPINE SURGERY      COLONOSCOPY N/A 01/31/2017    Normal exam repeat in 5 years    COLONOSCOPY N/A 02/11/2019    Mild acute inflammation    COLONOSCOPY N/A 04/07/2024    2 areas at 10 and 20 cm with friability ulceration 2 clips placed at 20 cm and 4 clips at 10 cm poor prep normal mucosa,mild eroisions and ulcerations in visible vessels    COLONOSCOPY N/A 7/1/2024    Procedure: COLONOSCOPY WITH ANESTHESIA;  Surgeon: Arsalan Lorenzo DO;  Location: [facility] ENDOSCOPY;  Service: Gastroenterology;  Laterality: N/A;  pre op constipation/diarrhea  post poor prep  pcp Del Shetty    COLONOSCOPY N/A 7/2/2024    Procedure: COLONOSCOPY WITH ANESTHESIA;  Surgeon: Agapito Christopher MD;  Location: [facility] ENDOSCOPY;  Service: Gastroenterology;  Laterality: N/A;  pre rectal bleeding  post poor prep  pcp Del Shetty MD    COLONOSCOPY W/ POLYPECTOMY  03/04/2014    Hyperplastic  polyp    CORONARY ARTERY BYPASS GRAFT  10/2015    ENDOSCOPY  2011    Gastritis with hemorrhage    ENDOSCOPY N/A 2017    Normal exam    ENDOSCOPY N/A 2019    Gastritis    ENDOSCOPY N/A 2020    Non-erosive gastritis with hemorrhage    ENDOSCOPY N/A 02/10/2021    Esophagitis    ENDOSCOPY N/A 2024    There were esophageal mucosal changes suspicious for short-segment Low's esophagus present in the distal esophagus. The maximum longitudinal extent of these mucosal changes was 2 cm in length. Mucosa was biopsied with a cold forceps for histologyDistal esophagus, biopsies: Mild chronic active esophagogastritis. No evidence of intestinal metaplasia, dysplasia. Antrum, bx, Mild chronic gastritis    FOOT SURGERY Left     INCISION AND DRAINAGE OF WOUND Left 2007    spider bite       Family History  Family History   Problem Relation Age of Onset    Colon cancer Father     Heart disease Father     Colon cancer Sister     Colon polyps Sister     Alzheimer's disease Mother     Coronary artery disease Sister     Coronary artery disease Sister        Social History  Social History     Socioeconomic History    Marital status:    Tobacco Use    Smoking status: Former     Current packs/day: 0.00     Types: Cigarettes     Quit date:      Years since quittin.0    Smokeless tobacco: Never    Tobacco comments:     smoked in [employer]   Vaping Use    Vaping status: Never Used   Substance and Sexual Activity    Alcohol use: No    Drug use: No    Sexual activity: Defer         Review of Systems:  History obtained from chart review and the patient  General ROS: No fever or chills  Respiratory ROS: positive for - wheezing  Cardiovascular ROS: + chest pain, no palpitations  Gastrointestinal ROS: No abdominal pain or melena  Genito-Urinary ROS: No dysuria or hematuria  Psych ROS: No anxiety and depression  14 point ROS reviewed with the patient and negative except as noted above and in the HPI  "unless unable to obtain.      Objective:  Patient Vitals for the past 24 hrs:   BP Temp Temp src Pulse Resp SpO2 Height Weight   01/09/25 1158 108/64 98.2 °F (36.8 °C) Oral 72 18 100 % -- --   01/09/25 0825 121/55 97.4 °F (36.3 °C) Axillary 68 18 98 % -- --   01/09/25 0550 126/76 97.6 °F (36.4 °C) Oral 64 18 94 % -- --   01/09/25 0403 106/58 97.4 °F (36.3 °C) Oral 96 22 95 % -- --   01/09/25 0200 138/74 97.4 °F (36.3 °C) Oral 73 18 92 % -- --   01/08/25 2358 125/59 98 °F (36.7 °C) Oral 66 18 97 % -- --   01/08/25 2007 137/75 97.9 °F (36.6 °C) Oral 80 18 98 % -- --   01/08/25 1621 128/59 98 °F (36.7 °C) Oral 76 18 100 % -- --   01/08/25 1505 134/64 -- -- -- -- -- 182.9 cm (72\") 128 kg (282 lb)       Intake/Output Summary (Last 24 hours) at 1/9/2025 1435  Last data filed at 1/9/2025 0200  Gross per 24 hour   Intake --   Output 1350 ml   Net -1350 ml     General: awake/alert   Chest:  clear to auscultation bilaterally without respiratory distress  CVS:  IRRR  Abdominal: soft, nontender, positive bowel sounds  Extremities:  lower extremity edema  Skin: warm and dry without rash      Labs:  Results from last 7 days   Lab Units 01/09/25  0345 01/08/25  0014 01/07/25  0430   WBC 10*3/mm3 9.04 6.70 7.14   HEMOGLOBIN g/dL 9.2* 8.3* 8.4*   HEMATOCRIT % 29.9* 26.4* 26.7*   PLATELETS 10*3/mm3 380 276 250         Results from last 7 days   Lab Units 01/09/25  0505 01/09/25  0345 01/08/25  0014 01/06/25  1848 01/06/25  1648   SODIUM mmol/L 137 137 139   < > 140   POTASSIUM mmol/L 5.0 6.0* 5.3*   < > 5.2   CHLORIDE mmol/L 102 100 104   < > 105   CO2 mmol/L 19.0* 21.0* 23.0   < > 20.0*   BUN mg/dL 63* 62* 62*   < > 66*   CREATININE mg/dL 2.68* 2.87* 2.83*   < > 3.03*   CALCIUM mg/dL 8.9 9.1 8.6   < > 8.7   EGFR mL/min/1.73 25.1* 23.1* 23.5*   < > 21.7*   BILIRUBIN mg/dL  --  0.4  --   --  0.3   ALK PHOS U/L  --  169*  --   --  166*   ALT (SGPT) U/L  --  21  --   --  27   AST (SGOT) U/L  --  21  --   --  21   GLUCOSE mg/dL 277* 282* " 183*   < > 289*    < > = values in this interval not displayed.       Radiology:   Imaging Results (Last 72 Hours)       Procedure Component Value Units Date/Time    CT Abdomen Pelvis Without Contrast [[id number]] Collected: 01/09/25 0624     Updated: 01/09/25 0835    Narrative:      EXAMINATION: CT ABDOMEN PELVIS WO CONTRAST-      1/9/2025 1:16 AM     HISTORY: severe abdominal pain; I48.91-Unspecified atrial fibrillation;  N18.4-Chronic kidney disease, stage 4 (severe); J81.0-Acute pulmonary  edema; J96.01-Acute respiratory failure with hypoxia; Z74.09-Other  reduced mobility     In order to have a CT radiation dose as low as reasonably achievable  Automated Exposure Control was utilized for adjustment of the mA and/or  KV according to patient size.     Total DLP = 626.95 mGy.cm     The CT scan of the abdomen and pelvis should performed without  intravenous contrast enhancement.     Images are acquired in axial plane and subsequent reconstruction in  coronal and sagittal planes.     The comparison is made with the previous study dated 10/20/2024.     The lung bases included in the study moderate bibasilar pleural  effusion. There areas of consolidation in the bilateral lung bases.  Presenting compression atelectasis due to adjacent pleural effusion.  There are atelectatic changes in the lower lungs. There is mild to  moderate pulmonary venous congestion.     Limited visualized cardiomediastinal structures show moderate  cardiomegaly. Atheromatous changes of the coronary arteries are seen.     Unenhanced liver and spleen appear unremarkable. An isodense mass or  lesion may not be evaluated or excluded.     The gallbladder is surgically absent.     There is fatty infiltration of the pancreas. No mass.     The adrenal glands are unremarkable.     There is moderate lobulation of the renal contour bilaterally. There is  perinephric fat infiltration. There is an exophytic low-density mass  from the lateral margin of the  lower pole of the right kidney measuring  4.6 cm in diameter. CT density suggest a cyst. No radiopaque calculi. No  hydronephrosis. The ureters are normal. The urinary bladder is poorly  distended with moderate wall thickening. No intrinsic abnormality.     The prostate is not significantly enlarged.     There are small fat-containing inguinal hernias, right larger than the  left.     The stomach and duodenum are normal. Small bowel is nondilated. The  appendix is surgically absent. There is a large volume stool in the  colon. No finding to suggest obstruction.     Atheromatous changes of the abdominal aorta and iliac arteries. No  aneurysmal dilatation.     There is no evidence of abdominal or pelvic lymphadenopathy.     Images reviewed in bone window show no acute bony abnormality. Chronic  degenerative changes of the lumbar and limited visualized thoracic  spine. Old healed nonunited fractures of the right lower ribs are noted.       Impression:      1. No acute abnormality of the abdomen or pelvis to account for  patient's symptoms.  2. Large volume stool in the colon without evidence of obstruction may  suggest constipation.  3. The gallbladder and appendix are surgically absent.  4. Right renal cyst.  5. Moderate bibasilar pleural effusion and compression atelectasis of  the lower lobes bilaterally.     The above study was initially reviewed and reported by [facility]. I do not  find any discrepancies.                                                  This report was signed and finalized on 1/9/2025 8:32 AM by Dr. Carlos Cutler MD.       XR Chest 1 View [[id number]] Collected: 01/06/25 1605     Updated: 01/06/25 1609    Narrative:      EXAM: XR CHEST 1 VW- 1/6/2025 3:02 PM     HISTORY: Shortness of breath       COMPARISON: 12/8/2024.     TECHNIQUE: Single frontal radiograph of the chest was obtained.     FINDINGS:     Support Devices: Prior CABG.     Cardiac and Mediastinal Silhouettes: Stable cardiomegaly.    "  Lungs/Pleura: Prominent perihilar interstitial opacities with vascular  indistinctness. Possible trace left effusion. No visible pneumothorax.     Osseous structures: No acute osseous finding.     Other: None.       Impression:         Pulmonary edema pattern with possible trace left effusion.           This report was signed and finalized on 1/6/2025 4:06 PM by Ronal Wisdom.               Culture:  No results found for: \"BLOODCX\", \"URINECX\", \"WOUNDCX\", \"MRSACX\", \"RESPCX\", \"STOOLCX\"      Assessment    Acute kidney injury  Baseline chronic kidney disease stage 3b  Type 2 diabetes with renal disease  Hypertension  Anemia of CKD  Obesity   Metabolic acidosis  Hyperkalemia  Sec HPTH    Plan:  Continue IV Bumex, po metolazone  Monitor labs  Low K diet  oral calcitriol.             Vinny Hartley MD  1/9/2025  14:35 CST  "
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
## 2025-01-09 NOTE — PLAN OF CARE
Problem: Adult Inpatient Plan of Care  Goal: Plan of Care Review  Outcome: Progressing  Goal: Patient-Specific Goal (Individualized)  Outcome: Progressing  Goal: Absence of Hospital-Acquired Illness or Injury  Outcome: Progressing  Intervention: Identify and Manage Fall Risk  Recent Flowsheet Documentation  Taken 1/9/2025 1604 by Walt Will RN  Safety Promotion/Fall Prevention: safety round/check completed  Taken 1/9/2025 1507 by Walt Will RN  Safety Promotion/Fall Prevention: safety round/check completed  Taken 1/9/2025 1420 by Walt Will RN  Safety Promotion/Fall Prevention: safety round/check completed  Taken 1/9/2025 1302 by Walt Will RN  Safety Promotion/Fall Prevention: safety round/check completed  Taken 1/9/2025 1204 by Walt Will RN  Safety Promotion/Fall Prevention: safety round/check completed  Taken 1/9/2025 1103 by Walt Will RN  Safety Promotion/Fall Prevention: safety round/check completed  Taken 1/9/2025 1011 by Walt Will RN  Safety Promotion/Fall Prevention: safety round/check completed  Taken 1/9/2025 0916 by Walt Will RN  Safety Promotion/Fall Prevention: safety round/check completed  Taken 1/9/2025 0830 by Walt Will RN  Safety Promotion/Fall Prevention: safety round/check completed  Taken 1/9/2025 0739 by Walt Will RN  Safety Promotion/Fall Prevention: safety round/check completed  Intervention: Prevent Skin Injury  Recent Flowsheet Documentation  Taken 1/9/2025 1604 by Walt Will RN  Body Position: position changed independently  Taken 1/9/2025 1507 by Walt Will RN  Body Position: position changed independently  Taken 1/9/2025 1420 by Walt Will RN  Body Position: position changed independently  Taken 1/9/2025 1302 by Walt Will RN  Body Position: position changed independently  Taken 1/9/2025 1204 by Walt Will RN  Body Position: position changed independently  Taken 1/9/2025 1103 by Walt Will RN  Body Position: position changed  independently  Taken 1/9/2025 1011 by Walt Will RN  Body Position: position changed independently  Taken 1/9/2025 0916 by Walt Will RN  Body Position: position changed independently  Taken 1/9/2025 0830 by Walt Will RN  Body Position: position changed independently  Taken 1/9/2025 0739 by Walt Will RN  Body Position: position changed independently  Intervention: Prevent and Manage VTE (Venous Thromboembolism) Risk  Recent Flowsheet Documentation  Taken 1/9/2025 0739 by Walt Will RN  VTE Prevention/Management: (Eliquis) other (see comments)  Goal: Optimal Comfort and Wellbeing  Outcome: Progressing  Intervention: Monitor Pain and Promote Comfort  Recent Flowsheet Documentation  Taken 1/9/2025 0830 by Walt Will RN  Pain Management Interventions: pain medication given  Intervention: Provide Person-Centered Care  Recent Flowsheet Documentation  Taken 1/9/2025 0739 by Walt Will RN  Trust Relationship/Rapport: care explained  Goal: Readiness for Transition of Care  Outcome: Progressing     Problem: Pain Acute  Goal: Optimal Pain Control and Function  Outcome: Progressing  Intervention: Develop Pain Management Plan  Recent Flowsheet Documentation  Taken 1/9/2025 0830 by Walt Will RN  Pain Management Interventions: pain medication given  Intervention: Prevent or Manage Pain  Recent Flowsheet Documentation  Taken 1/9/2025 0739 by Walt Will RN  Medication Review/Management: medications reviewed     Problem: Fall Injury Risk  Goal: Absence of Fall and Fall-Related Injury  Outcome: Progressing  Intervention: Identify and Manage Contributors  Recent Flowsheet Documentation  Taken 1/9/2025 0739 by Walt Will RN  Medication Review/Management: medications reviewed  Intervention: Promote Injury-Free Environment  Recent Flowsheet Documentation  Taken 1/9/2025 1604 by Walt Will RN  Safety Promotion/Fall Prevention: safety round/check completed  Taken 1/9/2025 1507 by Walt Will RN  Safety  Promotion/Fall Prevention: safety round/check completed  Taken 1/9/2025 1420 by Walt Will RN  Safety Promotion/Fall Prevention: safety round/check completed  Taken 1/9/2025 1302 by Walt Will RN  Safety Promotion/Fall Prevention: safety round/check completed  Taken 1/9/2025 1204 by Walt Will RN  Safety Promotion/Fall Prevention: safety round/check completed  Taken 1/9/2025 1103 by Walt Will RN  Safety Promotion/Fall Prevention: safety round/check completed  Taken 1/9/2025 1011 by Walt Will RN  Safety Promotion/Fall Prevention: safety round/check completed  Taken 1/9/2025 0916 by Walt Will RN  Safety Promotion/Fall Prevention: safety round/check completed  Taken 1/9/2025 0830 by Walt Will RN  Safety Promotion/Fall Prevention: safety round/check completed  Taken 1/9/2025 0739 by Walt Will RN  Safety Promotion/Fall Prevention: safety round/check completed     Problem: Skin Injury Risk Increased  Goal: Skin Health and Integrity  Outcome: Progressing  Intervention: Optimize Skin Protection  Recent Flowsheet Documentation  Taken 1/9/2025 1604 by Walt Will RN  Activity Management: up in chair  Taken 1/9/2025 1507 by Walt Will RN  Activity Management: up in chair  Taken 1/9/2025 1420 by Walt Will RN  Activity Management: up in chair  Taken 1/9/2025 1302 by Walt Will RN  Activity Management: up in chair  Taken 1/9/2025 1204 by Walt Will RN  Activity Management: up in chair  Taken 1/9/2025 1011 by Walt Will RN  Activity Management: up in chair  Taken 1/9/2025 0916 by Walt Will RN  Activity Management: up in chair  Taken 1/9/2025 0830 by Walt Will RN  Activity Management: sitting, edge of bed  Taken 1/9/2025 0739 by Walt Will RN  Activity Management: up in chair  Pressure Reduction Techniques: frequent weight shift encouraged  Pressure Reduction Devices: pressure-redistributing mattress utilized   Goal Outcome Evaluation:

## 2025-01-09 NOTE — PLAN OF CARE
"Goal Outcome Evaluation:  Plan of Care Reviewed With: patient        Progress: no change  Outcome Evaluation: around midnight pt began complaining of severe abd pain, prn morphine was given, pt stated it did nothing, notified MD of ongoing abd pain, one time sanchez of dilaudid ordered and given. pt states the dilaudid didn't help the pain at all, notified MD. MD came to bedside to assess pt, EKG done, pt taken down to CT for abd scan. After getting back from CT pt began to pace and kept saying \"help me, help me, someone needs to help me\" pt was given tylenol, buspar, hyoscyamine and a suppository. pt attempted to leave room, stated he was going to another hospital since we won't do anything for him. Notified MD of continuing abd pain and pt's agitation, one time dose of dilaudid ordered and given. pt sitting in chair w/ a fan currently, states pain has improved.    medication given this morning for elevated potassium, plan of care reviewed  Tele - florecita                            "
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
## 2025-01-10 ENCOUNTER — APPOINTMENT (OUTPATIENT)
Dept: GENERAL RADIOLOGY | Facility: HOSPITAL | Age: 70
DRG: 264 | End: 2025-01-10
Payer: MEDICARE

## 2025-01-10 LAB
ALBUMIN SERPL-MCNC: 3.1 G/DL (ref 3.5–5.2)
ALBUMIN/GLOB SERPL: 1 G/DL
ALP SERPL-CCNC: 142 U/L (ref 39–117)
ALT SERPL W P-5'-P-CCNC: 15 U/L (ref 1–41)
ANION GAP SERPL CALCULATED.3IONS-SCNC: 12 MMOL/L (ref 5–15)
AST SERPL-CCNC: 12 U/L (ref 1–40)
BASOPHILS # BLD AUTO: 0.06 10*3/MM3 (ref 0–0.2)
BASOPHILS NFR BLD AUTO: 0.7 % (ref 0–1.5)
BILIRUB SERPL-MCNC: 0.3 MG/DL (ref 0–1.2)
BUN SERPL-MCNC: 66 MG/DL (ref 8–23)
BUN/CREAT SERPL: 24.4 (ref 7–25)
CALCIUM SPEC-SCNC: 8.8 MG/DL (ref 8.6–10.5)
CHLORIDE SERPL-SCNC: 104 MMOL/L (ref 98–107)
CO2 SERPL-SCNC: 23 MMOL/L (ref 22–29)
CREAT SERPL-MCNC: 2.71 MG/DL (ref 0.76–1.27)
DEPRECATED RDW RBC AUTO: 46.6 FL (ref 37–54)
EGFRCR SERPLBLD CKD-EPI 2021: 24.8 ML/MIN/1.73
EOSINOPHIL # BLD AUTO: 0.89 10*3/MM3 (ref 0–0.4)
EOSINOPHIL NFR BLD AUTO: 11.1 % (ref 0.3–6.2)
ERYTHROCYTE [DISTWIDTH] IN BLOOD BY AUTOMATED COUNT: 14.4 % (ref 12.3–15.4)
GLOBULIN UR ELPH-MCNC: 3.1 GM/DL
GLUCOSE BLDC GLUCOMTR-MCNC: 205 MG/DL (ref 70–130)
GLUCOSE BLDC GLUCOMTR-MCNC: 233 MG/DL (ref 70–130)
GLUCOSE BLDC GLUCOMTR-MCNC: 261 MG/DL (ref 70–130)
GLUCOSE BLDC GLUCOMTR-MCNC: 328 MG/DL (ref 70–130)
GLUCOSE BLDC GLUCOMTR-MCNC: 475 MG/DL (ref 70–130)
GLUCOSE SERPL-MCNC: 248 MG/DL (ref 65–99)
HCT VFR BLD AUTO: 26.3 % (ref 37.5–51)
HGB BLD-MCNC: 8.3 G/DL (ref 13–17.7)
IMM GRANULOCYTES # BLD AUTO: 0.11 10*3/MM3 (ref 0–0.05)
IMM GRANULOCYTES NFR BLD AUTO: 1.4 % (ref 0–0.5)
LYMPHOCYTES # BLD AUTO: 1.05 10*3/MM3 (ref 0.7–3.1)
LYMPHOCYTES NFR BLD AUTO: 13 % (ref 19.6–45.3)
MCH RBC QN AUTO: 28.4 PG (ref 26.6–33)
MCHC RBC AUTO-ENTMCNC: 31.6 G/DL (ref 31.5–35.7)
MCV RBC AUTO: 90.1 FL (ref 79–97)
MONOCYTES # BLD AUTO: 0.95 10*3/MM3 (ref 0.1–0.9)
MONOCYTES NFR BLD AUTO: 11.8 % (ref 5–12)
NEUTROPHILS NFR BLD AUTO: 4.99 10*3/MM3 (ref 1.7–7)
NEUTROPHILS NFR BLD AUTO: 62 % (ref 42.7–76)
NRBC BLD AUTO-RTO: 0 /100 WBC (ref 0–0.2)
PLATELET # BLD AUTO: 335 10*3/MM3 (ref 140–450)
PMV BLD AUTO: 11.5 FL (ref 6–12)
POTASSIUM SERPL-SCNC: 5.1 MMOL/L (ref 3.5–5.2)
PROT SERPL-MCNC: 6.2 G/DL (ref 6–8.5)
RBC # BLD AUTO: 2.92 10*6/MM3 (ref 4.14–5.8)
SODIUM SERPL-SCNC: 139 MMOL/L (ref 136–145)
WBC NRBC COR # BLD AUTO: 8.05 10*3/MM3 (ref 3.4–10.8)

## 2025-01-10 PROCEDURE — 74018 RADEX ABDOMEN 1 VIEW: CPT

## 2025-01-10 PROCEDURE — 82948 REAGENT STRIP/BLOOD GLUCOSE: CPT

## 2025-01-10 PROCEDURE — 25010000002 BUMETANIDE PER 0.5 MG: Performed by: HOSPITALIST

## 2025-01-10 PROCEDURE — 99232 SBSQ HOSP IP/OBS MODERATE 35: CPT | Performed by: CLINICAL NURSE SPECIALIST

## 2025-01-10 PROCEDURE — 97110 THERAPEUTIC EXERCISES: CPT

## 2025-01-10 PROCEDURE — 25010000002 ONDANSETRON PER 1 MG: Performed by: HOSPITALIST

## 2025-01-10 PROCEDURE — 85025 COMPLETE CBC W/AUTO DIFF WBC: CPT | Performed by: HOSPITALIST

## 2025-01-10 PROCEDURE — 25010000002 MORPHINE PER 10 MG: Performed by: HOSPITALIST

## 2025-01-10 PROCEDURE — 63710000001 INSULIN LISPRO (HUMAN) PER 5 UNITS: Performed by: HOSPITALIST

## 2025-01-10 PROCEDURE — 80053 COMPREHEN METABOLIC PANEL: CPT | Performed by: HOSPITALIST

## 2025-01-10 PROCEDURE — 97116 GAIT TRAINING THERAPY: CPT

## 2025-01-10 PROCEDURE — 99232 SBSQ HOSP IP/OBS MODERATE 35: CPT | Performed by: INTERNAL MEDICINE

## 2025-01-10 RX ORDER — METOPROLOL TARTRATE 25 MG/1
25 TABLET, FILM COATED ORAL EVERY 12 HOURS SCHEDULED
Status: COMPLETED | OUTPATIENT
Start: 2025-01-10 | End: 2025-01-10

## 2025-01-10 RX ORDER — METOPROLOL SUCCINATE 50 MG/1
50 TABLET, EXTENDED RELEASE ORAL
Status: DISCONTINUED | OUTPATIENT
Start: 2025-01-11 | End: 2025-01-13 | Stop reason: HOSPADM

## 2025-01-10 RX ORDER — SERTRALINE HYDROCHLORIDE 25 MG/1
25 TABLET, FILM COATED ORAL DAILY
Status: DISCONTINUED | OUTPATIENT
Start: 2025-01-10 | End: 2025-01-13 | Stop reason: HOSPADM

## 2025-01-10 RX ADMIN — METOPROLOL TARTRATE 25 MG: 25 TABLET, FILM COATED ORAL at 21:18

## 2025-01-10 RX ADMIN — MORPHINE SULFATE 2 MG: 2 INJECTION, SOLUTION INTRAMUSCULAR; INTRAVENOUS at 01:23

## 2025-01-10 RX ADMIN — BUMETANIDE 1 MG: 0.25 INJECTION INTRAMUSCULAR; INTRAVENOUS at 17:03

## 2025-01-10 RX ADMIN — MORPHINE SULFATE 2 MG: 2 INJECTION, SOLUTION INTRAMUSCULAR; INTRAVENOUS at 21:46

## 2025-01-10 RX ADMIN — OXYCODONE HYDROCHLORIDE 10 MG: 5 TABLET ORAL at 23:50

## 2025-01-10 RX ADMIN — DONEPEZIL HYDROCHLORIDE 10 MG: 10 TABLET, FILM COATED ORAL at 21:18

## 2025-01-10 RX ADMIN — PANTOPRAZOLE SODIUM 40 MG: 40 TABLET, DELAYED RELEASE ORAL at 21:17

## 2025-01-10 RX ADMIN — ONDANSETRON 4 MG: 2 INJECTION INTRAMUSCULAR; INTRAVENOUS at 11:17

## 2025-01-10 RX ADMIN — HYDRALAZINE HYDROCHLORIDE 10 MG: 10 TABLET ORAL at 06:00

## 2025-01-10 RX ADMIN — ISOSORBIDE DINITRATE 10 MG: 10 TABLET ORAL at 13:51

## 2025-01-10 RX ADMIN — INSULIN LISPRO 4 UNITS: 100 INJECTION, SOLUTION INTRAVENOUS; SUBCUTANEOUS at 17:10

## 2025-01-10 RX ADMIN — INSULIN LISPRO 6 UNITS: 100 INJECTION, SOLUTION INTRAVENOUS; SUBCUTANEOUS at 08:34

## 2025-01-10 RX ADMIN — HYDRALAZINE HYDROCHLORIDE 10 MG: 10 TABLET ORAL at 13:51

## 2025-01-10 RX ADMIN — APIXABAN 5 MG: 5 TABLET, FILM COATED ORAL at 21:18

## 2025-01-10 RX ADMIN — ONDANSETRON 4 MG: 2 INJECTION INTRAMUSCULAR; INTRAVENOUS at 17:03

## 2025-01-10 RX ADMIN — MORPHINE SULFATE 2 MG: 2 INJECTION, SOLUTION INTRAMUSCULAR; INTRAVENOUS at 14:01

## 2025-01-10 RX ADMIN — MORPHINE SULFATE 2 MG: 2 INJECTION, SOLUTION INTRAMUSCULAR; INTRAVENOUS at 05:59

## 2025-01-10 RX ADMIN — SERTRALINE HYDROCHLORIDE 25 MG: 25 TABLET ORAL at 13:02

## 2025-01-10 RX ADMIN — BUSPIRONE HYDROCHLORIDE 10 MG: 10 TABLET ORAL at 13:02

## 2025-01-10 RX ADMIN — Medication 10 ML: at 08:37

## 2025-01-10 RX ADMIN — PANTOPRAZOLE SODIUM 40 MG: 40 TABLET, DELAYED RELEASE ORAL at 08:34

## 2025-01-10 RX ADMIN — Medication 10 ML: at 21:19

## 2025-01-10 RX ADMIN — INSULIN LISPRO 4 UNITS: 100 INJECTION, SOLUTION INTRAVENOUS; SUBCUTANEOUS at 11:17

## 2025-01-10 RX ADMIN — ISOSORBIDE DINITRATE 10 MG: 10 TABLET ORAL at 08:34

## 2025-01-10 RX ADMIN — APIXABAN 5 MG: 5 TABLET, FILM COATED ORAL at 08:34

## 2025-01-10 RX ADMIN — CALCITRIOL CAPSULES 0.25 MCG 0.25 MCG: 0.25 CAPSULE ORAL at 08:34

## 2025-01-10 RX ADMIN — BUMETANIDE 1 MG: 0.25 INJECTION INTRAMUSCULAR; INTRAVENOUS at 06:01

## 2025-01-10 RX ADMIN — EMPAGLIFLOZIN 10 MG: 10 TABLET, FILM COATED ORAL at 17:18

## 2025-01-10 RX ADMIN — MORPHINE SULFATE 2 MG: 2 INJECTION, SOLUTION INTRAMUSCULAR; INTRAVENOUS at 17:51

## 2025-01-10 RX ADMIN — OXYCODONE HYDROCHLORIDE 10 MG: 5 TABLET ORAL at 08:34

## 2025-01-10 RX ADMIN — METOPROLOL TARTRATE 25 MG: 25 TABLET, FILM COATED ORAL at 08:34

## 2025-01-10 RX ADMIN — ISOSORBIDE DINITRATE 10 MG: 10 TABLET ORAL at 17:18

## 2025-01-10 RX ADMIN — ONDANSETRON 4 MG: 2 INJECTION INTRAMUSCULAR; INTRAVENOUS at 02:15

## 2025-01-10 RX ADMIN — INSULIN LISPRO 7 UNITS: 100 INJECTION, SOLUTION INTRAVENOUS; SUBCUTANEOUS at 21:46
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
## 2025-01-10 NOTE — PLAN OF CARE
Goal Outcome Evaluation:  Plan of Care Reviewed With: patient        Progress: no change  Outcome Evaluation: pt VSS on room air, tele - florecita 66-88. pt up in room w/ standby assist.pt reported four episodes of loose stools during the day. pt complaind of mid abd pain and cramping, improved with morphine and zofran. plan of care reviewed

## 2025-01-10 NOTE — PLAN OF CARE
Goal Outcome Evaluation:  Plan of Care Reviewed With: patient        Progress: no change  Outcome Evaluation: He co ABD pain, medication given. KUB done. Wound care to bottom of left foot. Cont to monitor.

## 2025-01-10 NOTE — PROGRESS NOTES
Nephrology ([facility]) progress Note      Patient:  Erick Luong  YOB: 1955  Date of Service: 1/10/2025  MRN: [id number]   Acct: [id number]   Primary Care Physician: Del Shetty MD  Advance Directive:   Code Status and Medical Interventions: CPR (Attempt to Resuscitate); Full Support   Ordered at: 01/06/25 1800     Code Status (Patient has no pulse and is not breathing):    CPR (Attempt to Resuscitate)     Medical Interventions (Patient has pulse or is breathing):    Full Support     Admit Date: 1/6/2025       Hospital Day: 2  Referring Provider: Latanya Paez MD      Patient personally seen and examined.  Complete chart including Consults, Notes, Operative Reports, Labs, Cardiology, and Radiology studies reviewed as able.        Subjective:  Erick Luong is a 69 y.o. male for whom we were consulted for evaluation and treatment of acute kidney injury. Baseline chronic kidney disease stage 3b, baseline creatinine approximately 1.8.  Follows with Dr Lomas in our office. History of poorly controlled dialysis, hypertension, coronary artery disease, chronic diastolic CHF, BRITTNI, diabetic foot ulcer, obesity. On 1/06 was sent to ER from PCP office with rapid heart rate and dyspnea. In ER labs revealed creatinine 3.0. Noted to be in atrial fibrillation with RVR. Received IV Bumex and was admitted to medical floor. Currently is awake and alert. No dyspnea or pain at rest. Gets short of breath with any activity. Denies n/v/d. Urine output nonoliguric. Today, he offered no new complaints. Has good urine output, has no significant leg edema. Still having poor appetite.     Allergies:  Cefepime, Bactrim [sulfamethoxazole-trimethoprim], Vancomycin, Zolpidem, and Metronidazole    Home Meds:  Medications Prior to Admission   Medication Sig Dispense Refill Last Dose/Taking    apixaban (Eliquis) 5 MG tablet tablet Take 1 tablet by mouth 2 (Two) Times a Day. 60 tablet 0 Taking     ascorbic acid (VITAMIN C) 1000 MG tablet Take 1 tablet by mouth Daily. 30 tablet 3 Taking    busPIRone (BUSPAR) 10 MG tablet Take 1 tablet by mouth 3 (Three) Times a Day As Needed (anxiety).   1/6/2025    donepezil (ARICEPT) 10 MG tablet Take 1 tablet by mouth every night at bedtime. 30 tablet 0 Taking    Insulin Glargine (Lantus SoloStar) 100 UNIT/ML injection pen Inject 20 units nightly as directed (Patient taking differently: Inject 20 Units under the skin into the appropriate area as directed Every Night.   Patient stated that he is still taking 35 units at bedtime) 15 mL 3 Taking Differently    oxyCODONE (ROXICODONE) 10 MG tablet Take 1 tablet by mouth Every 12 (Twelve) Hours As Needed for Moderate Pain.   Taking As Needed    pantoprazole (PROTONIX) 40 MG EC tablet Take 1 tablet by mouth 2 (Two) Times a Day. 90 tablet 4 Taking    potassium chloride ER (K-TAB) 20 MEQ tablet controlled-release ER tablet Take 1 tablet by mouth Daily. 30 tablet 0 Taking    rosuvastatin (CRESTOR) 10 MG tablet Take 1 tablet by mouth every night at bedtime. 30 tablet 2 Taking    sennosides-docusate (PERICOLACE) 8.6-50 MG per tablet Take 1 tablet by mouth Every Night. Obtain OTC   Taking    sertraline (ZOLOFT) 25 MG tablet Take 1 tablet by mouth Daily.   Taking    sodium hypochlorite (DAKIN'S 1/4 STRENGTH) 0.125 % solution topical solution 0.125% Apply  topically to the appropriate area as directed 2 (Two) Times a Day. 473 mL 5 Taking    tamsulosin (FLOMAX) 0.4 MG capsule 24 hr capsule Take 1 capsule by mouth Daily. 90 capsule 4 Taking    bumetanide (BUMEX) 2 MG tablet Take 1 tablet by mouth 2 (Two) Times a Day.       docusate sodium 100 MG capsule Take 1 capsule by mouth Daily.       famotidine (PEPCID) 20 MG tablet Take 1 tablet by mouth 2 (Two) Times a Day. 60 tablet 0 Unknown    Insulin Lispro (humaLOG) 100 UNIT/ML injection Inject 2-12 Units under the skin into the appropriate area as directed 4 (Four) Times a Day Before Meals &  at Bedtime. Inject subcutaneously four times a day per sliding scale:  0-150 = 0 units; 151-200 = 2u; 201-250 = 4u; 251-300 = 6u; 301-350 = 8u; 351-400 = 10u; 401+ = 12u       ipratropium-albuterol (DUO-NEB) 0.5-2.5 mg/3 ml nebulizer Take 3 mL by nebulization 4 (Four) Times a Day As Needed. (Patient taking differently: Take 3 mL by nebulization 4 (Four) Times a Day As Needed for Wheezing or Shortness of Air.) 360 mL 3     lisinopril (PRINIVIL,ZESTRIL) 5 MG tablet Take 1 tablet by mouth Daily. 30 tablet 0 Unknown    melatonin 3 MG tablet Take 2 tablets by mouth At Night As Needed for Sleep. 30 tablet 0     melatonin 3 MG tablet Take 1 tablet by mouth Daily. 30 tablet 0 Unknown    nitroglycerin (NITROSTAT) 0.4 MG SL tablet Place 1 tablet as needed under the tongue every 5 minutes. If no improvement after 3 tablets go to ER (Patient taking differently: Place 1 tablet under the tongue As Needed for Chest Pain.) 25 tablet 0     polyethylene glycol (MIRALAX) 17 GM/SCOOP powder Take 17 g by mouth Daily As Needed (constipation).   Unknown    pregabalin (LYRICA) 100 MG capsule Take 1 capsule by mouth 3 (Three) Times a Day.          Medicines:  Current Facility-Administered Medications   Medication Dose Route Frequency Provider Last Rate Last Admin    acetaminophen (TYLENOL) tablet 650 mg  650 mg Oral Q4H PRN Latanya Paez MD   650 mg at 01/09/25 0308    Or    acetaminophen (TYLENOL) 160 MG/5ML oral solution 650 mg  650 mg Oral Q4H PRN Latanya Paez MD        Or    acetaminophen (TYLENOL) suppository 650 mg  650 mg Rectal Q4H PRN Latanya Paez MD        apixaban (ELIQUIS) tablet 5 mg  5 mg Oral BID Latanya Paez MD   5 mg at 01/10/25 0834    sennosides-docusate (PERICOLACE) 8.6-50 MG per tablet 2 tablet  2 tablet Oral BID PRN Latanya Paez MD   2 tablet at 01/08/25 2021    And    polyethylene glycol (MIRALAX) packet 17 g  17 g Oral Daily PRN Latanya Paez MD   17 g at 01/09/25 0018    And    bisacodyl (DULCOLAX)  EC tablet 5 mg  5 mg Oral Daily PRN Latanya Paez MD        And    bisacodyl (DULCOLAX) suppository 10 mg  10 mg Rectal Daily PRN Latanya Paez MD   10 mg at 01/09/25 0328    bumetanide (BUMEX) injection 1 mg  1 mg Intravenous Q12H Latanya Paez MD   1 mg at 01/10/25 0601    busPIRone (BUSPAR) tablet 10 mg  10 mg Oral TID PRN Latanya Paez MD   10 mg at 01/10/25 1302    calcitriol (ROCALTROL) capsule 0.25 mcg  0.25 mcg Oral Daily Vinny Hartley MD   0.25 mcg at 01/10/25 0834    Calcium Replacement - Follow Nurse / BPA Driven Protocol   Not Applicable PRN Latanya Paez MD        dextrose (D50W) (25 g/50 mL) IV injection 25 g  25 g Intravenous Q15 Min PRN Latanya Paez MD        dextrose (GLUTOSE) oral gel 15 g  15 g Oral Q15 Min PRN Latanya Paez MD        donepezil (ARICEPT) tablet 10 mg  10 mg Oral Nightly Latanya Paez MD   10 mg at 01/09/25 2051    glucagon (GLUCAGEN) injection 1 mg  1 mg Intramuscular Q15 Min PRN Latanya Paez MD        hydrALAZINE (APRESOLINE) tablet 10 mg  10 mg Oral Q8H Donna Roman APRN   10 mg at 01/10/25 1351    Insulin Lispro (humaLOG) injection 2-9 Units  2-9 Units Subcutaneous 4x Daily AC & at Bedtime Latanya Paez MD   4 Units at 01/10/25 1117    ipratropium-albuterol (DUO-NEB) nebulizer solution 3 mL  3 mL Nebulization 4x Daily PRN Latanya Paez MD        isosorbide dinitrate (ISORDIL) tablet 10 mg  10 mg Oral TID - Nitrates Donna Roman APRN   10 mg at 01/10/25 1351    lactulose solution 30 g  30 g Oral TID Latanya Paez MD   30 g at 01/09/25 1733    Magnesium Standard Dose Replacement - Follow Nurse / BPA Driven Protocol   Not Applicable PRN Latanya Paez MD        [START ON 1/11/2025] metoprolol succinate XL (TOPROL-XL) 24 hr tablet 50 mg  50 mg Oral Q24H Ezekiel Roman APRN        metoprolol tartrate (LOPRESSOR) tablet 25 mg  25 mg Oral Q12H Ezekiel Roman APRN        Morphine sulfate (PF) injection 2 mg  2 mg Intravenous Q4H PRN  Latanya Paez MD   2 mg at 01/10/25 1401    And    naloxone (NARCAN) injection 0.4 mg  0.4 mg Intravenous Q5 Min PRN Latanya Paez MD        nitroglycerin (NITROSTAT) SL tablet 0.4 mg  0.4 mg Sublingual Q5 Min PRN Latanya Paez MD   0.4 mg at 01/07/25 2353    ondansetron (ZOFRAN) injection 4 mg  4 mg Intravenous Q6H PRN Latanya Paez MD   4 mg at 01/10/25 1117    oxyCODONE (ROXICODONE) immediate release tablet 10 mg  10 mg Oral Q12H PRN Latanya Paez MD   10 mg at 01/10/25 0834    pantoprazole (PROTONIX) EC tablet 40 mg  40 mg Oral BID Latanya Paez MD   40 mg at 01/10/25 0834    Phosphorus Replacement - Follow Nurse / BPA Driven Protocol   Not Applicable PRN Latanya Paez MD        Potassium Replacement - Follow Nurse / BPA Driven Protocol   Not Applicable PRN Latanya Paez MD        sertraline (ZOLOFT) tablet 25 mg  25 mg Oral Daily Deborah Real, APRN   25 mg at 01/10/25 1302    sodium chloride 0.9 % flush 10 mL  10 mL Intravenous Q12H Latanya Paez MD   10 mL at 01/10/25 0837    sodium chloride 0.9 % flush 10 mL  10 mL Intravenous PRN Latanya Paez MD        sodium chloride 0.9 % infusion 40 mL  40 mL Intravenous PRN aLtanya Paez MD           Past Medical History:  Past Medical History:   Diagnosis Date    Arthritis     Autonomic disease     CAD (coronary artery disease) 02/06/2017    Cervical radiculopathy 09/16/2021    Chronic constipation with acute exaccerbation 05/10/2021    Coronary artery disease     Degeneration of cervical intervertebral disc 08/11/2021    Diabetes mellitus     Diabetic foot ulcer 08/31/2020    Diabetic polyneuropathy associated with type 2 diabetes mellitus 01/18/2021    Elevated cholesterol     Gastroesophageal reflux disease 05/13/2019    Headache     HTN (hypertension), benign 05/03/2017    Hyperlipidemia     Hypertension     Mixed hyperlipidemia 02/07/2017    Multiple lung nodules 01/26/2020    5mm, 9 mm RLL identified 1/2020, not present 10/2019.     Myocardial infarction     Osteomyelitis 01/22/2020    Osteomyelitis of fifth toe of right foot 10/07/2019    Pancreatitis     Persistent insomnia 01/20/2020    Renal disorder     Sleep apnea 02/06/2017    Sleep apnea with use of continuous positive airway pressure (CPAP)     NON-COMPLIANT    Slow transit constipation 01/16/2019    Spinal stenosis in cervical region 09/16/2021    Vitamin D deficiency 03/02/2021       Past Surgical History:  Past Surgical History:   Procedure Laterality Date    ABDOMINAL SURGERY      AMPUTATION FOOT / TOE Left 10/2021    5th digit     ANTERIOR CERVICAL DISCECTOMY W/ FUSION N/A 08/05/2022    Procedure: CERVICAL DISCECTOMY ANTERIOR WITH FUSION C5-6 with possible plating of C5-7 with neuromonitoring and 1 c-arm;  Surgeon: Karel Soliz MD;  Location: [facility] OR;  Service: Neurosurgery;  Laterality: N/A;    APPENDECTOMY      BACK SURGERY      CARDIAC CATHETERIZATION Left 02/08/2021    Procedure: Left Heart Cath w poss intervention left anatomical snuff box acess;  Surgeon: Omkar Charles DO;  Location:  [facility];  Service: Cardiology;  Laterality: Left;    CARDIAC SURGERY      CATARACT EXTRACTION      CERVICAL SPINE SURGERY      COLONOSCOPY N/A 01/31/2017    Normal exam repeat in 5 years    COLONOSCOPY N/A 02/11/2019    Mild acute inflammation    COLONOSCOPY N/A 04/07/2024    2 areas at 10 and 20 cm with friability ulceration 2 clips placed at 20 cm and 4 clips at 10 cm poor prep normal mucosa,mild eroisions and ulcerations in visible vessels    COLONOSCOPY N/A 7/1/2024    Procedure: COLONOSCOPY WITH ANESTHESIA;  Surgeon: Arsalan Lorenzo DO;  Location:  [facility];  Service: Gastroenterology;  Laterality: N/A;  pre op constipation/diarrhea  post poor prep  pcp Del Shetty    COLONOSCOPY N/A 7/2/2024    Procedure: COLONOSCOPY WITH ANESTHESIA;  Surgeon: Agapito Christopher MD;  Location:  [facility];  Service: Gastroenterology;  Laterality:  N/A;  pre rectal bleeding  post poor prep  pcp Del Shetty MD    COLONOSCOPY W/ POLYPECTOMY  2014    Hyperplastic polyp    CORONARY ARTERY BYPASS GRAFT  10/2015    ENDOSCOPY  2011    Gastritis with hemorrhage    ENDOSCOPY N/A 2017    Normal exam    ENDOSCOPY N/A 2019    Gastritis    ENDOSCOPY N/A 2020    Non-erosive gastritis with hemorrhage    ENDOSCOPY N/A 02/10/2021    Esophagitis    ENDOSCOPY N/A 2024    There were esophageal mucosal changes suspicious for short-segment Low's esophagus present in the distal esophagus. The maximum longitudinal extent of these mucosal changes was 2 cm in length. Mucosa was biopsied with a cold forceps for histologyDistal esophagus, biopsies: Mild chronic active esophagogastritis. No evidence of intestinal metaplasia, dysplasia. Antrum, bx, Mild chronic gastritis    FOOT SURGERY Left     INCISION AND DRAINAGE OF WOUND Left 2007    spider bite       Family History  Family History   Problem Relation Age of Onset    Colon cancer Father     Heart disease Father     Colon cancer Sister     Colon polyps Sister     Alzheimer's disease Mother     Coronary artery disease Sister     Coronary artery disease Sister        Social History  Social History     Socioeconomic History    Marital status:    Tobacco Use    Smoking status: Former     Current packs/day: 0.00     Types: Cigarettes     Quit date:      Years since quittin.0    Smokeless tobacco: Never    Tobacco comments:     smoked in highschool   Vaping Use    Vaping status: Never Used   Substance and Sexual Activity    Alcohol use: No    Drug use: No    Sexual activity: Defer         Review of Systems:  History obtained from chart review and the patient  General ROS: No fever or chills  Respiratory ROS: positive for - wheezing  Cardiovascular ROS: + chest pain, no palpitations  Gastrointestinal ROS: No abdominal pain or melena  Genito-Urinary ROS: No dysuria or  hematuria  Psych ROS: No anxiety and depression  14 point ROS reviewed with the patient and negative except as noted above and in the HPI unless unable to obtain.      Objective:  Patient Vitals for the past 24 hrs:   BP Temp Temp src Pulse Resp SpO2   01/10/25 1351 132/71 -- -- 79 -- --   01/10/25 1122 120/51 97.8 °F (36.6 °C) Oral 70 18 98 %   01/10/25 0848 118/63 98.1 °F (36.7 °C) Oral 85 18 95 %   01/10/25 0300 123/61 97.8 °F (36.6 °C) Oral 78 18 97 %   01/09/25 2315 117/64 97.6 °F (36.4 °C) Oral 76 18 95 %   01/09/25 1915 121/61 97.5 °F (36.4 °C) Oral 82 18 95 %   01/09/25 1741 122/64 98.1 °F (36.7 °C) Oral 74 18 100 %       Intake/Output Summary (Last 24 hours) at 1/10/2025 1431  Last data filed at 1/10/2025 0126  Gross per 24 hour   Intake 240 ml   Output 900 ml   Net -660 ml     General: awake/alert   Chest:  clear to auscultation bilaterally without respiratory distress  CVS:  IRRR  Abdominal: soft, nontender, positive bowel sounds  Extremities:  lower extremity edema  Skin: warm and dry without rash      Labs:  Results from last 7 days   Lab Units 01/10/25  0151 01/09/25  0345 01/08/25  0014   WBC 10*3/mm3 8.05 9.04 6.70   HEMOGLOBIN g/dL 8.3* 9.2* 8.3*   HEMATOCRIT % 26.3* 29.9* 26.4*   PLATELETS 10*3/mm3 335 380 276         Results from last 7 days   Lab Units 01/10/25  0150 01/09/25  0505 01/09/25  0345 01/06/25  1848 01/06/25  1648   SODIUM mmol/L 139 137 137   < > 140   POTASSIUM mmol/L 5.1 5.0 6.0*   < > 5.2   CHLORIDE mmol/L 104 102 100   < > 105   CO2 mmol/L 23.0 19.0* 21.0*   < > 20.0*   BUN mg/dL 66* 63* 62*   < > 66*   CREATININE mg/dL 2.71* 2.68* 2.87*   < > 3.03*   CALCIUM mg/dL 8.8 8.9 9.1   < > 8.7   EGFR mL/min/1.73 24.8* 25.1* 23.1*   < > 21.7*   BILIRUBIN mg/dL 0.3  --  0.4  --  0.3   ALK PHOS U/L 142*  --  169*  --  166*   ALT (SGPT) U/L 15  --  21  --  27   AST (SGOT) U/L 12  --  21  --  21   GLUCOSE mg/dL 248* 277* 282*   < > 289*    < > = values in this interval not displayed.        Radiology:   Imaging Results (Last 72 Hours)       Procedure Component Value Units Date/Time    CT Abdomen Pelvis Without Contrast [[id number]] Collected: 01/09/25 0624     Updated: 01/09/25 0835    Narrative:      EXAMINATION: CT ABDOMEN PELVIS WO CONTRAST-      1/9/2025 1:16 AM     HISTORY: severe abdominal pain; I48.91-Unspecified atrial fibrillation;  N18.4-Chronic kidney disease, stage 4 (severe); J81.0-Acute pulmonary  edema; J96.01-Acute respiratory failure with hypoxia; Z74.09-Other  reduced mobility     In order to have a CT radiation dose as low as reasonably achievable  Automated Exposure Control was utilized for adjustment of the mA and/or  KV according to patient size.     Total DLP = 626.95 mGy.cm     The CT scan of the abdomen and pelvis should performed without  intravenous contrast enhancement.     Images are acquired in axial plane and subsequent reconstruction in  coronal and sagittal planes.     The comparison is made with the previous study dated 10/20/2024.     The lung bases included in the study moderate bibasilar pleural  effusion. There areas of consolidation in the bilateral lung bases.  Presenting compression atelectasis due to adjacent pleural effusion.  There are atelectatic changes in the lower lungs. There is mild to  moderate pulmonary venous congestion.     Limited visualized cardiomediastinal structures show moderate  cardiomegaly. Atheromatous changes of the coronary arteries are seen.     Unenhanced liver and spleen appear unremarkable. An isodense mass or  lesion may not be evaluated or excluded.     The gallbladder is surgically absent.     There is fatty infiltration of the pancreas. No mass.     The adrenal glands are unremarkable.     There is moderate lobulation of the renal contour bilaterally. There is  perinephric fat infiltration. There is an exophytic low-density mass  from the lateral margin of the lower pole of the right kidney measuring  4.6 cm in diameter. CT  "density suggest a cyst. No radiopaque calculi. No  hydronephrosis. The ureters are normal. The urinary bladder is poorly  distended with moderate wall thickening. No intrinsic abnormality.     The prostate is not significantly enlarged.     There are small fat-containing inguinal hernias, right larger than the  left.     The stomach and duodenum are normal. Small bowel is nondilated. The  appendix is surgically absent. There is a large volume stool in the  colon. No finding to suggest obstruction.     Atheromatous changes of the abdominal aorta and iliac arteries. No  aneurysmal dilatation.     There is no evidence of abdominal or pelvic lymphadenopathy.     Images reviewed in bone window show no acute bony abnormality. Chronic  degenerative changes of the lumbar and limited visualized thoracic  spine. Old healed nonunited fractures of the right lower ribs are noted.       Impression:      1. No acute abnormality of the abdomen or pelvis to account for  patient's symptoms.  2. Large volume stool in the colon without evidence of obstruction may  suggest constipation.  3. The gallbladder and appendix are surgically absent.  4. Right renal cyst.  5. Moderate bibasilar pleural effusion and compression atelectasis of  the lower lobes bilaterally.     The above study was initially reviewed and reported by [facility]. I do not  find any discrepancies.                                                  This report was signed and finalized on 1/9/2025 8:32 AM by Dr. Carlos Cutler MD.               Culture:  No results found for: \"BLOODCX\", \"URINECX\", \"WOUNDCX\", \"MRSACX\", \"RESPCX\", \"STOOLCX\"      Assessment    Acute kidney injury  Baseline chronic kidney disease stage 3b  Type 2 diabetes with renal disease  Hypertension  Anemia of CKD  Obesity   Metabolic acidosis  Hyperkalemia  Sec HPTH    Plan:  Continue IV Bumex, po metolazone  Monitor labs  Low K diet  oral calcitriol.             Vinny Hartley, " MD  1/10/2025  14:31 CST

## 2025-01-10 NOTE — PROGRESS NOTES
"            [facility] Palliative Care Services    Palliative Care Daily Progress Note   Chief complaint-follow up support for patient/family and pain/symptom management    Code Status:   Code Status and Medical Interventions: CPR (Attempt to Resuscitate); Full Support   Ordered at: 01/06/25 1800     Code Status (Patient has no pulse and is not breathing):    CPR (Attempt to Resuscitate)     Medical Interventions (Patient has pulse or is breathing):    Full Support      Advanced Directives: Advance Directive Status: Patient has advance directive, copy in chart   Goals of Care: Ongoing.     S: Medical record reviewed. Events noted.  Echocardiogram shows EF 36 to 40% (previously 61 to 65% on 4/2/2024).  Nephrology following, receiving diuretics and metolazone started 1/8.  Cardiology following no indication for coronary angiography at this time.  Reportedly experienced severe abdominal pain 1/9 and additional workup ordered.  CT imaging abdomen pelvis shows no acute abnormality.  Large volume stool.  Moderate bibasilar pleural effusion and compression atelectasis of the lower lobes bilaterally.  Pulmonology consulted recommended supplemental oxygen as needed, follow-up pulmonary clinic outpatient for PFTs, signed off.  Started on lactulose for severe constipation per attending.  Cardiology following started on hydralazine and isosorbide 1/9, continue beta-blocker with plans to transition to Toprol XL at discharge, anticoagulation for atrial fibrillation, recommend HF clinic at discharge.  BM x 4 1/9.  Sitting up in chair without apparent distress.  Reports \"I just do not feel good today\".  He is unable to establish his concerns.  He does complain of abdominal pain. States I need to get up and walk around. States he had a bowel movement this morning.  Has complaints of anxiety.  In review of home medications Zoloft has not been restarted this hospitalization.  He is currently prescribed BuSpar as needed and " Aricept.  No family at bedside.  Attempts to reach patient's spouse and numbers provided in chart without success, left voicemail and awaiting callback.     Review of Systems   Skin:  Positive for poor wound healing.   Gastrointestinal:  Positive for abdominal pain and constipation.   Psychiatric/Behavioral:  The patient is nervous/anxious.      Pain Assessment  Preferred Pain Scale: number (Numeric Rating Pain Scale)  Nonverbal Indicators of Pain: nonverbal indicators absent  Pain Location: abdomen  Pain Description: aching, cramping    O:     Intake/Output Summary (Last 24 hours) at 1/10/2025 0832  Last data filed at 1/10/2025 0126  Gross per 24 hour   Intake 240 ml   Output 900 ml   Net -660 ml       Diagnostics and current medications: Reviewed.      Current Facility-Administered Medications:     acetaminophen (TYLENOL) tablet 650 mg, 650 mg, Oral, Q4H PRN, 650 mg at 01/09/25 0308 **OR** acetaminophen (TYLENOL) 160 MG/5ML oral solution 650 mg, 650 mg, Oral, Q4H PRN **OR** acetaminophen (TYLENOL) suppository 650 mg, 650 mg, Rectal, Q4H PRN, Latanya Paez MD    apixaban (ELIQUIS) tablet 5 mg, 5 mg, Oral, BID, Latanya Paez MD, 5 mg at 01/09/25 2051    sennosides-docusate (PERICOLACE) 8.6-50 MG per tablet 2 tablet, 2 tablet, Oral, BID PRN, 2 tablet at 01/08/25 2021 **AND** polyethylene glycol (MIRALAX) packet 17 g, 17 g, Oral, Daily PRN, 17 g at 01/09/25 0018 **AND** bisacodyl (DULCOLAX) EC tablet 5 mg, 5 mg, Oral, Daily PRN **AND** bisacodyl (DULCOLAX) suppository 10 mg, 10 mg, Rectal, Daily PRN, Latanya Paez MD, 10 mg at 01/09/25 0328    bumetanide (BUMEX) injection 1 mg, 1 mg, Intravenous, Q12H, Latanya Paez MD, 1 mg at 01/10/25 0601    busPIRone (BUSPAR) tablet 10 mg, 10 mg, Oral, TID PRN, Latanya Paez MD, 10 mg at 01/09/25 2051    calcitriol (ROCALTROL) capsule 0.25 mcg, 0.25 mcg, Oral, Daily, Vinny Hartley MD, 0.25 mcg at 01/09/25 0827    Calcium Replacement - Follow Nurse / BPA Driven  Protocol, , Not Applicable, PRN, Latanya Paez MD    dextrose (D50W) (25 g/50 mL) IV injection 25 g, 25 g, Intravenous, Q15 Min PRN, Latanya Paez MD    dextrose (GLUTOSE) oral gel 15 g, 15 g, Oral, Q15 Min PRN, Latanya Paez MD    donepezil (ARICEPT) tablet 10 mg, 10 mg, Oral, Nightly, Latanya Paez MD, 10 mg at 01/09/25 2051    glucagon (GLUCAGEN) injection 1 mg, 1 mg, Intramuscular, Q15 Min PRN, Latanya Paez MD    hydrALAZINE (APRESOLINE) tablet 10 mg, 10 mg, Oral, Q8H, Donna Roman APRN, 10 mg at 01/10/25 0600    Insulin Lispro (humaLOG) injection 2-9 Units, 2-9 Units, Subcutaneous, 4x Daily AC & at Bedtime, Latanya Paez MD, 7 Units at 01/09/25 1941    ipratropium-albuterol (DUO-NEB) nebulizer solution 3 mL, 3 mL, Nebulization, 4x Daily PRN, Latanya Paez MD    isosorbide dinitrate (ISORDIL) tablet 10 mg, 10 mg, Oral, TID - Nitrates, Donna Roman APRN, 10 mg at 01/09/25 1733    lactulose solution 30 g, 30 g, Oral, TID, Latanya Paez MD, 30 g at 01/09/25 1733    Magnesium Standard Dose Replacement - Follow Nurse / BPA Driven Protocol, , Not Applicable, PRN, Latanya Paez MD    metoprolol tartrate (LOPRESSOR) tablet 25 mg, 25 mg, Oral, Q12H, Emeka Garay MD, 25 mg at 01/09/25 2051    Morphine sulfate (PF) injection 2 mg, 2 mg, Intravenous, Q4H PRN, 2 mg at 01/10/25 0559 **AND** naloxone (NARCAN) injection 0.4 mg, 0.4 mg, Intravenous, Q5 Min PRN, Latanya Paez MD    nitroglycerin (NITROSTAT) SL tablet 0.4 mg, 0.4 mg, Sublingual, Q5 Min PRN, Latanya Paez MD, 0.4 mg at 01/07/25 2353    ondansetron (ZOFRAN) injection 4 mg, 4 mg, Intravenous, Q6H PRN, Latanya Paez MD, 4 mg at 01/10/25 0215    oxyCODONE (ROXICODONE) immediate release tablet 10 mg, 10 mg, Oral, Q12H PRN, Latanya Paez MD, 10 mg at 01/09/25 2051    pantoprazole (PROTONIX) EC tablet 40 mg, 40 mg, Oral, BID, Latanya Paez MD, 40 mg at 01/09/25 2051    Phosphorus Replacement - Follow Nurse / BPA Driven  "Protocol, , Not Applicable, PRJeannine MCCARTNEY Fakhri, MD    Potassium Replacement - Follow Nurse / BPA Driven Protocol, , Not Applicable, Jeannine CUADRA Fakhri, MD    sodium chloride 0.9 % flush 10 mL, 10 mL, Intravenous, Q12H, Latanya Paez MD, 10 mL at 01/09/25 2052    sodium chloride 0.9 % flush 10 mL, 10 mL, Intravenous, PRN, Latanya Paez MD    sodium chloride 0.9 % infusion 40 mL, 40 mL, Intravenous, PRNJeannine Fakhri, MD    Allergies   Allergen Reactions    Cefepime Hives and Anaphylaxis    Bactrim [Sulfamethoxazole-Trimethoprim] Other (See Comments)     \"RENAL FAILURE\"    Vancomycin Itching    Zolpidem Mental Status Change     \"makes him crazy\"    Metronidazole Rash     I have utilized all available immediate resources to obtain, update, or review the patient's current medications (including all prescriptions, over-the-counter products, herbals, cannabis/cannabidiol products, and vitamin/mineral/dietary (nutritional) supplements) for name, route of administration, type, dose and frequency.    A:    /61 (BP Location: Left arm, Patient Position: Lying)   Pulse 78   Temp 97.8 °F (36.6 °C) (Oral)   Resp 18   Ht 182.9 cm (72\")   Wt 128 kg (282 lb)   SpO2 97%   BMI 38.25 kg/m²     Vitals and nursing note reviewed.   Constitutional:       Appearance: Not in distress. Chronically ill-appearing.   Pulmonary:      Effort: Pulmonary effort is normal.   Cardiovascular:      Normal rate.   Edema:     Peripheral edema present.  Abdominal:      General: Abdomen is protuberant.   Musculoskeletal:      Cervical back: Neck supple.   Skin:     General: Skin is warm.      Comments: Wounds as documented left foot, dressing in place   Genitourinary:     Comments: No reported dysuria  Neurological:      Mental Status: Alert   Psychiatric:         Mood and Affect: Anxious     Patient status: Disease state: Controlled with current treatments.  Current Functional status: Palliative Performance Scale Score: Performance 60% " based on the following measures: Ambulation: Reduced, Activity and Evidence of Disease: Unable to do hobby or some work, significant evidence of disease, Self-Care: Occasional assist necessary,  Intake: Normal or reduced, LOC: Full or confusion   Baseline Functional status: Palliative Performance Scale Score: Performance 60% based on the following measures: Ambulation: Reduced, Activity and Evidence of Disease: Unable to do hobby or some work, significant evidence of disease, Self-Care: Occasional assist necessary,  Intake: Normal or reduced, LOC: Full or confusion   Nutritional status: Albumin 3.3 Body mass index is 38.27 kg/m².      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 plan: Goals of care include CODE STATUS CPR/full support interventions.     Family support: The patient receives support from his wife..  Advance Directives: Advance Directive Status: Patient has advance directive, copy requested   POA/Healthcare surrogate-spouseJoan-HCS.     2.  Palliative care encounter  - Prognosis is guarded long-term secondary to respiratory failure  with hypoxia, acute kidney injury, heart failure, and multiple comorbidities.  -Patient appears to have poor prognostic awareness.      -Blood cultures pending.  Treated with Cardizem, beta-blocker, Bumex.   -Cardiology consulted, with improvement in rate control no further cardiac workup or testing indicated, signed off.    -Nephrology consulted, noted 20 pound weight gain, receiving IV Bumex.  -Wound care APRN following for left foot wounds, underwent wound debridement 1/8 recommended follow-up with [facility].   1/8-developed chest pain overnight with improvement after receiving Nitrostat, no EKG changes per nocturnist, troponins have remained stable.    -Plans for echocardiogram.    -Therapy notes anticipate discharge home with assist from spouse.  -Echocardiogram shows EF 36 to 40% (previously 61 to 65% on 4/2/2024).   -Nephrology following, receiving diuretics and metolazone started 1/8.  -Cardiology following no indication for coronary angiography at this time.  1/9-experienced severe abdominal pain and additional workup ordered.  -Pulmonology consulted recommended supplemental oxygen as needed, follow-up pulmonary clinic outpatient for PFTs, signed off.    -Started on lactulose for severe constipation per attending.    -Cardiology following started on hydralazine and isosorbide 1/9, continue beta-blocker with plans to transition to Toprol XL at discharge, anticoagulation for atrial fibrillation, recommend HF clinic at discharge.        1/10-verified CODE STATUS 1/8.  Wishes to continue with full aggressive care interventions including not limited to CPR at this juncture, but would not wish to remain on life support measures long-term.  Encouraged ongoing consideration/conversation of care goals with his spouse to ensure she is aware of his wishes in this scenario.  MOST decision aid provided to further encourage discussion/education.  -High risk rehospitalization's and decline given multiple  "comorbid factors  -Nephrology following, receiving diuretics and metolazone started 1/8.  -Cardiology following no indication for coronary angiography at this time, medication changes, recommend heart failure clinic at discharge.   -Pulmonology consulted recommended supplemental oxygen as needed, follow-up pulmonary clinic outpatient for PFTs, signed off.   -attempts to reach spouse unsuccessful, left voicemail, awaiting call back    ADDENDUM @1:50 PM spoke with spouse, updated on care plan. Reports some intermittent short-term memory issues \"but not all the time and not bad\". Verbalizes understanding of multiple complex issues, but would prefer to continue current aggressive care interventions. Discussed anxiety issues and medications restarted to address this. We discussed high risk re-hospitalizations and decline. She has concerns his untreated sleep apnea, but also notes he has been noncompliant with CPAP machine in the past.  She has requested dietary assist with favorite foods as he is an extremely picky eater.  Electronic communication to care team.    3.  Constipation  -Lactulose, per attending     4.  Anxiety  -Buspar as needed  -restart home medication Zoloft      Thank you for allowing us to participate in patient's plan of care. Palliative Care Team will continue to follow patient.     Deborah Real, APRN  1/10/2025  08:32 CST   "

## 2025-01-10 NOTE — PROGRESS NOTES
[facility] -  Progress Note     LOS: 2 days   Patient Care Team:  Del Shetty MD as PCP - General (Family Medicine)  Emeka Garay MD as Cardiologist (Cardiology)  Cristopher Hannah MD as Consulting Physician (Gastroenterology)  Brian Lomas MD as Consulting Physician (Nephrology)  Karel Soliz MD as Surgeon (Neurosurgery)  Willy Romero APRN as Nurse Practitioner (Nurse Practitioner)  Dougie Taylor MD as Consulting Physician (Pulmonary Disease)    Chief Complaint: shortness of breath     Subjective     Interval History:     Patient Complaints: patient is sitting on side of bed today. He reports that he is still not feeling well. He is vague in detail. He reports that his shortness of breath is about the same. He feels some improvement in leg swelling. He states that his abdomen hurts and it goes to his epigastric area. He feels like dizziness is better today     Review of Systems:     Review of Systems   Respiratory:  Positive for shortness of breath.    Cardiovascular:  Positive for leg swelling (improving).   Gastrointestinal:  Positive for abdominal pain.   All other systems reviewed and are negative.    Objective     Vital Sign Min/Max for last 24 hours  Temp  Min: 97.5 °F (36.4 °C)  Max: 98.1 °F (36.7 °C)   BP  Min: 117/64  Max: 123/61   Pulse  Min: 70  Max: 85   Resp  Min: 18  Max: 18   SpO2  Min: 95 %  Max: 100 %   No data recorded   No data recorded         01/08/25  1505   Weight: 128 kg (282 lb)       Intake/Output Summary (Last 24 hours) at 1/10/2025 1201  Last data filed at 1/10/2025 0126  Gross per 24 hour   Intake 240 ml   Output 900 ml   Net -660 ml     Telemetry: Atrial fibrillation rates 74-86      Physical Exam:    Vitals reviewed.   Constitutional:       General: Not in acute distress.     Appearance: Normal appearance. Well-developed. Obese. Chronically ill-appearing.   Eyes:      Pupils: Pupils are equal, round, and reactive to light.    HENT:      Head: Normocephalic and atraumatic.      Nose: Nose normal.   Neck:      Vascular: No carotid bruit.   Pulmonary:      Effort: Pulmonary effort is normal. No respiratory distress.      Breath sounds: Normal breath sounds. No wheezing. No rales.   Cardiovascular:      Normal rate. Irregularly irregular rhythm.      Murmurs: There is no murmur.   Edema:     Peripheral edema present.     Ankle: trace edema of the left ankle.     Feet: trace edema of the right foot.  Abdominal:      General: There is no distension.      Palpations: Abdomen is soft.   Musculoskeletal: Normal range of motion.      Cervical back: Normal range of motion and neck supple. Skin:     General: Skin is warm.      Findings: No erythema or rash.   Neurological:      General: No focal deficit present.      Mental Status: Alert and oriented to person, place, and time.   Psychiatric:         Attention and Perception: Attention normal.         Mood and Affect: Mood normal.         Speech: Speech normal.         Behavior: Behavior normal.         Thought Content: Thought content normal.         Judgment: Judgment normal.       Results Review:   Lab Results (last 72 hours)       Procedure Component Value Units Date/Time    POC Glucose Once [[id number]]  (Abnormal) Collected: 01/10/25 1112    Specimen: Blood Updated: 01/10/25 1122     Glucose 205 mg/dL      Comment: : [id number] Perez AprilMeter ID: [id number]       POC Glucose Once [[id number]]  (Abnormal) Collected: 01/10/25 0745    Specimen: Blood Updated: 01/10/25 0826     Glucose 261 mg/dL      Comment: : [id number] Henry HeatherMeter ID: [id number]       Comprehensive Metabolic Panel [[id number]]  (Abnormal) Collected: 01/10/25 0150    Specimen: Blood Updated: 01/10/25 0234     Glucose 248 mg/dL      BUN 66 mg/dL      Creatinine 2.71 mg/dL      Sodium 139 mmol/L      Potassium 5.1 mmol/L      Chloride 104 mmol/L      CO2 23.0 mmol/L      Calcium 8.8 mg/dL      Total Protein 6.2  g/dL      Albumin 3.1 g/dL      ALT (SGPT) 15 U/L      AST (SGOT) 12 U/L      Alkaline Phosphatase 142 U/L      Total Bilirubin 0.3 mg/dL      Globulin 3.1 gm/dL      A/G Ratio 1.0 g/dL      BUN/Creatinine Ratio 24.4     Anion Gap 12.0 mmol/L      eGFR 24.8 mL/min/1.73     Narrative:      GFR Categories in Chronic Kidney Disease (CKD)      GFR Category          GFR (mL/min/1.73)    Interpretation  G1                     90 or greater         Normal or high (1)  G2                      60-89                Mild decrease (1)  G3a                   45-59                Mild to moderate decrease  G3b                   30-44                Moderate to severe decrease  G4                    15-29                Severe decrease  G5                    14 or less           Kidney failure          (1)In the absence of evidence of kidney disease, neither GFR category G1 or G2 fulfill the criteria for CKD.    eGFR calculation 2021 CKD-EPI creatinine equation, which does not include race as a factor    CBC & Differential [[id number]]  (Abnormal) Collected: 01/10/25 0151    Specimen: Blood Updated: 01/10/25 0212    Narrative:      The following orders were created for panel order CBC & Differential.  Procedure                               Abnormality         Status                     ---------                               -----------         ------                     CBC Auto Differential[[id number]]        Abnormal            Final result                 Please view results for these tests on the individual orders.    CBC Auto Differential [[id number]]  (Abnormal) Collected: 01/10/25 0151    Specimen: Blood Updated: 01/10/25 0212     WBC 8.05 10*3/mm3      RBC 2.92 10*6/mm3      Hemoglobin 8.3 g/dL      Hematocrit 26.3 %      MCV 90.1 fL      MCH 28.4 pg      MCHC 31.6 g/dL      RDW 14.4 %      RDW-SD 46.6 fl      MPV 11.5 fL      Platelets 335 10*3/mm3      Neutrophil % 62.0 %      Lymphocyte % 13.0 %      Monocyte % 11.8 %       Eosinophil % 11.1 %      Basophil % 0.7 %      Immature Grans % 1.4 %      Neutrophils, Absolute 4.99 10*3/mm3      Lymphocytes, Absolute 1.05 10*3/mm3      Monocytes, Absolute 0.95 10*3/mm3      Eosinophils, Absolute 0.89 10*3/mm3      Basophils, Absolute 0.06 10*3/mm3      Immature Grans, Absolute 0.11 10*3/mm3      nRBC 0.0 /100 WBC     POC Glucose Once [[id number]]  (Abnormal) Collected: 01/09/25 1914    Specimen: Blood Updated: 01/09/25 1930     Glucose 320 mg/dL      Comment: : [id number] Crisp RebeccaMeter ID: [id number]       Blood Culture - Blood, Arm, Left [[id number]]  (Normal) Collected: 01/06/25 1648    Specimen: Blood from Arm, Left Updated: 01/09/25 1715     Blood Culture No growth at 3 days    Blood Culture - Blood, Arm, Right [[id number]]  (Normal) Collected: 01/06/25 1700    Specimen: Blood from Arm, Right Updated: 01/09/25 1715     Blood Culture No growth at 3 days    POC Glucose Once [[id number]]  (Abnormal) Collected: 01/09/25 1120    Specimen: Blood Updated: 01/09/25 1131     Glucose 241 mg/dL      Comment: : [id number] Figueroa HeatherMeter ID: [id number]       POC Glucose Once [[id number]]  (Abnormal) Collected: 01/09/25 0728    Specimen: Blood Updated: 01/09/25 0744     Glucose 258 mg/dL      Comment: : [id number] [employer] HeatherMeter ID: [id number]       POC Glucose Once [[id number]]  (Abnormal) Collected: 01/09/25 0608    Specimen: Blood Updated: 01/09/25 0619     Glucose 276 mg/dL      Comment: : [id number] Crisp RebeccaMeter ID: [id number]       High Sensitivity Troponin T 1Hr [[id number]]  (Abnormal) Collected: 01/09/25 0505    Specimen: Blood Updated: 01/09/25 0549     HS Troponin T 66 ng/L      Troponin T Numeric Delta 2 ng/L      Troponin T % Delta 3 %     Narrative:      High Sensitive Troponin T Reference Range:  <14.0 ng/L- Negative Female for AMI  <22.0 ng/L- Negative Male for AMI  >=14 - Abnormal Female indicating possible myocardial injury.  >=22 - Abnormal Male  indicating possible myocardial injury.   Clinicians would have to utilize clinical acumen, EKG, Troponin, and serial changes to determine if it is an Acute Myocardial Infarction or myocardial injury due to an underlying chronic condition.         Basic Metabolic Panel [[id number]]  (Abnormal) Collected: 01/09/25 0505    Specimen: Blood Updated: 01/09/25 0545     Glucose 277 mg/dL      BUN 63 mg/dL      Creatinine 2.68 mg/dL      Sodium 137 mmol/L      Potassium 5.0 mmol/L      Chloride 102 mmol/L      CO2 19.0 mmol/L      Calcium 8.9 mg/dL      BUN/Creatinine Ratio 23.5     Anion Gap 16.0 mmol/L      eGFR 25.1 mL/min/1.73     Narrative:      GFR Categories in Chronic Kidney Disease (CKD)      GFR Category          GFR (mL/min/1.73)    Interpretation  G1                     90 or greater         Normal or high (1)  G2                      60-89                Mild decrease (1)  G3a                   45-59                Mild to moderate decrease  G3b                   30-44                Moderate to severe decrease  G4                    15-29                Severe decrease  G5                    14 or less           Kidney failure          (1)In the absence of evidence of kidney disease, neither GFR category G1 or G2 fulfill the criteria for CKD.    eGFR calculation 2021 CKD-EPI creatinine equation, which does not include race as a factor    Magnesium [[id number]]  (Normal) Collected: 01/09/25 0345    Specimen: Blood Updated: 01/09/25 0443     Magnesium 2.1 mg/dL     High Sensitivity Troponin T [[id number]]  (Abnormal) Collected: 01/09/25 0345    Specimen: Blood Updated: 01/09/25 0429     HS Troponin T 64 ng/L      Comment: Specimen hemolyzed.  Results may be falsely decreased.       Narrative:      High Sensitive Troponin T Reference Range:  <14.0 ng/L- Negative Female for AMI  <22.0 ng/L- Negative Male for AMI  >=14 - Abnormal Female indicating possible myocardial injury.  >=22 - Abnormal Male indicating possible  myocardial injury.   Clinicians would have to utilize clinical acumen, EKG, Troponin, and serial changes to determine if it is an Acute Myocardial Infarction or myocardial injury due to an underlying chronic condition.         Lipase [[id number]]  (Abnormal) Collected: 01/09/25 0345    Specimen: Blood Updated: 01/09/25 0428     Lipase 12 U/L      Comment: Specimen hemolyzed.  Results may be falsely decreased.       Comprehensive Metabolic Panel [[id number]]  (Abnormal) Collected: 01/09/25 0345    Specimen: Blood Updated: 01/09/25 0428     Glucose 282 mg/dL      BUN 62 mg/dL      Creatinine 2.87 mg/dL      Sodium 137 mmol/L      Potassium 6.0 mmol/L      Comment: Specimen hemolyzed.  Result may be falsely elevated.        Chloride 100 mmol/L      CO2 21.0 mmol/L      Calcium 9.1 mg/dL      Total Protein 7.4 g/dL      Albumin 3.4 g/dL      ALT (SGPT) 21 U/L      Comment: Specimen hemolyzed.  Result may  be falsely elevated.        AST (SGOT) 21 U/L      Comment: Specimen hemolyzed.  Result may be falsely elevated.        Alkaline Phosphatase 169 U/L      Total Bilirubin 0.4 mg/dL      Globulin 4.0 gm/dL      A/G Ratio 0.9 g/dL      BUN/Creatinine Ratio 21.6     Anion Gap 16.0 mmol/L      eGFR 23.1 mL/min/1.73     Narrative:      GFR Categories in Chronic Kidney Disease (CKD)      GFR Category          GFR (mL/min/1.73)    Interpretation  G1                     90 or greater         Normal or high (1)  G2                      60-89                Mild decrease (1)  G3a                   45-59                Mild to moderate decrease  G3b                   30-44                Moderate to severe decrease  G4                    15-29                Severe decrease  G5                    14 or less           Kidney failure          (1)In the absence of evidence of kidney disease, neither GFR category G1 or G2 fulfill the criteria for CKD.    eGFR calculation 2021 CKD-EPI creatinine equation, which does not include race as a  factor    CBC & Differential [[id number]]  (Abnormal) Collected: 01/09/25 0345    Specimen: Blood Updated: 01/09/25 0403    Narrative:      The following orders were created for panel order CBC & Differential.  Procedure                               Abnormality         Status                     ---------                               -----------         ------                     CBC Auto Differential[[id number]]        Abnormal            Final result                 Please view results for these tests on the individual orders.    CBC Auto Differential [[id number]]  (Abnormal) Collected: 01/09/25 0345    Specimen: Blood Updated: 01/09/25 0403     WBC 9.04 10*3/mm3      RBC 3.27 10*6/mm3      Hemoglobin 9.2 g/dL      Hematocrit 29.9 %      MCV 91.4 fL      MCH 28.1 pg      MCHC 30.8 g/dL      RDW 14.4 %      RDW-SD 47.8 fl      MPV 11.7 fL      Platelets 380 10*3/mm3      Neutrophil % 64.5 %      Lymphocyte % 13.1 %      Monocyte % 10.5 %      Eosinophil % 9.8 %      Basophil % 1.0 %      Immature Grans % 1.1 %      Neutrophils, Absolute 5.83 10*3/mm3      Lymphocytes, Absolute 1.18 10*3/mm3      Monocytes, Absolute 0.95 10*3/mm3      Eosinophils, Absolute 0.89 10*3/mm3      Basophils, Absolute 0.09 10*3/mm3      Immature Grans, Absolute 0.10 10*3/mm3      nRBC 0.0 /100 WBC     POC Glucose Once [[id number]]  (Abnormal) Collected: 01/08/25 2013    Specimen: Blood Updated: 01/08/25 2029     Glucose 370 mg/dL      Comment: : [id number] Crisp RebeccaMeter ID: [id number]       POC Glucose Once [[id number]]  (Abnormal) Collected: 01/08/25 1623    Specimen: Blood Updated: 01/08/25 1657     Glucose 286 mg/dL      Comment: : [id number] Navya TharpeMeter ID: [id number]       POC Glucose Once [[id number]]  (Abnormal) Collected: 01/08/25 0731    Specimen: Blood Updated: 01/08/25 1253     Glucose 227 mg/dL      Comment: : [id number] Navya TharpeMeter ID: [id number]       POC Glucose Once [[id number]]  (Abnormal)  Collected: 01/08/25 1119    Specimen: Blood Updated: 01/08/25 1139     Glucose 273 mg/dL      Comment: : [id number] Navya Ceja ID: [id number]       High Sensitivity Troponin T [[id number]] Collected: 01/08/25 0219    Specimen: Blood Updated: 01/08/25 0345     HS Troponin T --     Comment: This is a duplicate order  Corrected result. Previous result was 63 ng/L on 1/8/2025 at 0324 CST.       Narrative:      High Sensitive Troponin T Reference Range:  <14.0 ng/L- Negative Female for AMI  <22.0 ng/L- Negative Male for AMI  >=14 - Abnormal Female indicating possible myocardial injury.  >=22 - Abnormal Male indicating possible myocardial injury.   Clinicians would have to utilize clinical acumen, EKG, Troponin, and serial changes to determine if it is an Acute Myocardial Infarction or myocardial injury due to an underlying chronic condition.         High Sensitivity Troponin T 1Hr [[id number]]  (Abnormal) Collected: 01/08/25 0219    Specimen: Blood Updated: 01/08/25 0344     HS Troponin T 63 ng/L      Troponin T Numeric Delta 1 ng/L      Troponin T % Delta 2 %     Narrative:      High Sensitive Troponin T Reference Range:  <14.0 ng/L- Negative Female for AMI  <22.0 ng/L- Negative Male for AMI  >=14 - Abnormal Female indicating possible myocardial injury.  >=22 - Abnormal Male indicating possible myocardial injury.   Clinicians would have to utilize clinical acumen, EKG, Troponin, and serial changes to determine if it is an Acute Myocardial Infarction or myocardial injury due to an underlying chronic condition.         Iron Profile [[id number]]  (Abnormal) Collected: 01/08/25 0014    Specimen: Blood Updated: 01/08/25 0043     Iron 24 mcg/dL      Iron Saturation (TSAT) 11 %      Transferrin 149 mg/dL      TIBC 222 mcg/dL     Basic Metabolic Panel [[id number]]  (Abnormal) Collected: 01/08/25 0014    Specimen: Blood Updated: 01/08/25 0043     Glucose 183 mg/dL      BUN 62 mg/dL      Creatinine 2.83 mg/dL      Sodium  139 mmol/L      Potassium 5.3 mmol/L      Chloride 104 mmol/L      CO2 23.0 mmol/L      Calcium 8.6 mg/dL      BUN/Creatinine Ratio 21.9     Anion Gap 12.0 mmol/L      eGFR 23.5 mL/min/1.73     Narrative:      GFR Categories in Chronic Kidney Disease (CKD)      GFR Category          GFR (mL/min/1.73)    Interpretation  G1                     90 or greater         Normal or high (1)  G2                      60-89                Mild decrease (1)  G3a                   45-59                Mild to moderate decrease  G3b                   30-44                Moderate to severe decrease  G4                    15-29                Severe decrease  G5                    14 or less           Kidney failure          (1)In the absence of evidence of kidney disease, neither GFR category G1 or G2 fulfill the criteria for CKD.    eGFR calculation 2021 CKD-EPI creatinine equation, which does not include race as a factor    Uric Acid [[id number]]  (Abnormal) Collected: 01/08/25 0014    Specimen: Blood Updated: 01/08/25 0043     Uric Acid 7.1 mg/dL     High Sensitivity Troponin T [[id number]]  (Abnormal) Collected: 01/08/25 0014    Specimen: Blood Updated: 01/08/25 0043     HS Troponin T 62 ng/L     Narrative:      High Sensitive Troponin T Reference Range:  <14.0 ng/L- Negative Female for AMI  <22.0 ng/L- Negative Male for AMI  >=14 - Abnormal Female indicating possible myocardial injury.  >=22 - Abnormal Male indicating possible myocardial injury.   Clinicians would have to utilize clinical acumen, EKG, Troponin, and serial changes to determine if it is an Acute Myocardial Infarction or myocardial injury due to an underlying chronic condition.         CBC & Differential [[id number]]  (Abnormal) Collected: 01/08/25 0014    Specimen: Blood Updated: 01/08/25 0023    Narrative:      The following orders were created for panel order CBC & Differential.  Procedure                               Abnormality         Status                   "   ---------                               -----------         ------                     CBC Auto Differential[[id number]]        Abnormal            Final result                 Please view results for these tests on the individual orders.    CBC Auto Differential [[id number]]  (Abnormal) Collected: 01/08/25 0014    Specimen: Blood Updated: 01/08/25 0023     WBC 6.70 10*3/mm3      RBC 2.93 10*6/mm3      Hemoglobin 8.3 g/dL      Hematocrit 26.4 %      MCV 90.1 fL      MCH 28.3 pg      MCHC 31.4 g/dL      RDW 14.3 %      RDW-SD 47.3 fl      MPV 10.7 fL      Platelets 276 10*3/mm3      Neutrophil % 61.5 %      Lymphocyte % 12.7 %      Monocyte % 11.5 %      Eosinophil % 12.7 %      Basophil % 0.9 %      Immature Grans % 0.7 %      Neutrophils, Absolute 4.12 10*3/mm3      Lymphocytes, Absolute 0.85 10*3/mm3      Monocytes, Absolute 0.77 10*3/mm3      Eosinophils, Absolute 0.85 10*3/mm3      Basophils, Absolute 0.06 10*3/mm3      Immature Grans, Absolute 0.05 10*3/mm3      nRBC 0.0 /100 WBC     POC Glucose Once [[id number]]  (Abnormal) Collected: 01/07/25 2038    Specimen: Blood Updated: 01/07/25 2101     Glucose 283 mg/dL      Comment: : [id number] Radha Velasquezeter ID: [id number]       PTH, Intact [[id number]]  (Abnormal) Collected: 01/07/25 1603    Specimen: Blood Updated: 01/07/25 1642     PTH, Intact 111.3 pg/mL     Narrative:      Results may be falsely decreased if patient taking Biotin.      POC Glucose Once [[id number]]  (Abnormal) Collected: 01/07/25 1114    Specimen: Blood Updated: 01/07/25 1623     Glucose 169 mg/dL      Comment: : [id number] Manolo UgaldehMeter ID: [id number]       POC Glucose Once [[id number]]  (Abnormal) Collected: 01/07/25 1610    Specimen: Blood Updated: 01/07/25 1620     Glucose 362 mg/dL      Comment: : [id number] Manolo UgaldehMeter ID: [id number]                   Echo EF Estimated  No results found for: \"ECHOEFEST\"      Cath Ejection Fraction Quantitative  No results found " "for: \"CATHEF\"        Medication Review: yes  Current Facility-Administered Medications   Medication Dose Route Frequency Provider Last Rate Last Admin    acetaminophen (TYLENOL) tablet 650 mg  650 mg Oral Q4H PRN Latanya Paez MD   650 mg at 01/09/25 0308    Or    acetaminophen (TYLENOL) 160 MG/5ML oral solution 650 mg  650 mg Oral Q4H PRN Latanya Paez MD        Or    acetaminophen (TYLENOL) suppository 650 mg  650 mg Rectal Q4H PRN Latanya Paez MD        apixaban (ELIQUIS) tablet 5 mg  5 mg Oral BID Latanya Paez MD   5 mg at 01/10/25 0834    sennosides-docusate (PERICOLACE) 8.6-50 MG per tablet 2 tablet  2 tablet Oral BID PRN Latanya Paez MD   2 tablet at 01/08/25 2021    And    polyethylene glycol (MIRALAX) packet 17 g  17 g Oral Daily PRN Latanya Paez MD   17 g at 01/09/25 0018    And    bisacodyl (DULCOLAX) EC tablet 5 mg  5 mg Oral Daily PRN Latanya Paez MD        And    bisacodyl (DULCOLAX) suppository 10 mg  10 mg Rectal Daily PRN Latanya Paez MD   10 mg at 01/09/25 0328    bumetanide (BUMEX) injection 1 mg  1 mg Intravenous Q12H Latanya Paez MD   1 mg at 01/10/25 0601    busPIRone (BUSPAR) tablet 10 mg  10 mg Oral TID PRN Latanya Paez MD   10 mg at 01/09/25 2051    calcitriol (ROCALTROL) capsule 0.25 mcg  0.25 mcg Oral Daily Vinny Hartley MD   0.25 mcg at 01/10/25 0834    Calcium Replacement - Follow Nurse / BPA Driven Protocol   Not Applicable PRN Latanya Paez MD        dextrose (D50W) (25 g/50 mL) IV injection 25 g  25 g Intravenous Q15 Min PRN Latanya Paze MD        dextrose (GLUTOSE) oral gel 15 g  15 g Oral Q15 Min PRN Latanya Paez MD        donepezil (ARICEPT) tablet 10 mg  10 mg Oral Nightly Latanya Paez MD   10 mg at 01/09/25 2051    glucagon (GLUCAGEN) injection 1 mg  1 mg Intramuscular Q15 Min PRN Latanya Paez MD        hydrALAZINE (APRESOLINE) tablet 10 mg  10 mg Oral Q8H Donna Roman APRN   10 mg at 01/10/25 0600    Insulin Lispro " (humaLOG) injection 2-9 Units  2-9 Units Subcutaneous 4x Daily AC & at Bedtime Latanya Paez MD   4 Units at 01/10/25 1117    ipratropium-albuterol (DUO-NEB) nebulizer solution 3 mL  3 mL Nebulization 4x Daily PRN Latanya Paez MD        isosorbide dinitrate (ISORDIL) tablet 10 mg  10 mg Oral TID - Nitrates Donna Roman, APRN   10 mg at 01/10/25 0834    lactulose solution 30 g  30 g Oral TID Latanya Paez MD   30 g at 01/09/25 1733    Magnesium Standard Dose Replacement - Follow Nurse / BPA Driven Protocol   Not Applicable PRN Latanya Paez MD        [START ON 1/11/2025] metoprolol succinate XL (TOPROL-XL) 24 hr tablet 50 mg  50 mg Oral Q24H Ezekiel Roman APRN        metoprolol tartrate (LOPRESSOR) tablet 25 mg  25 mg Oral Q12H Ezekiel Roman APRN        Morphine sulfate (PF) injection 2 mg  2 mg Intravenous Q4H PRN Latanya Paez MD   2 mg at 01/10/25 0559    And    naloxone (NARCAN) injection 0.4 mg  0.4 mg Intravenous Q5 Min PRN Latanya Paez MD        nitroglycerin (NITROSTAT) SL tablet 0.4 mg  0.4 mg Sublingual Q5 Min PRN Latanya Paez MD   0.4 mg at 01/07/25 2353    ondansetron (ZOFRAN) injection 4 mg  4 mg Intravenous Q6H PRN Latanya Paez MD   4 mg at 01/10/25 1117    oxyCODONE (ROXICODONE) immediate release tablet 10 mg  10 mg Oral Q12H PRN Latanya Paez MD   10 mg at 01/10/25 0834    pantoprazole (PROTONIX) EC tablet 40 mg  40 mg Oral BID Latanya Paez MD   40 mg at 01/10/25 0834    Phosphorus Replacement - Follow Nurse / BPA Driven Protocol   Not Applicable PRN Latanya Paez MD        Potassium Replacement - Follow Nurse / BPA Driven Protocol   Not Applicable PRN Latanya Paez MD        sertraline (ZOLOFT) tablet 25 mg  25 mg Oral Daily Deborah Real, SUSAN        sodium chloride 0.9 % flush 10 mL  10 mL Intravenous Q12H Latanya Paez MD   10 mL at 01/10/25 0837    sodium chloride 0.9 % flush 10 mL  10 mL Intravenous PRN Latanya Paez MD        sodium chloride 0.9 %  infusion 40 mL  40 mL Intravenous PRN Latanya Paez MD             Assessment & Plan     -Permanent atrial fibrillation: Telemetry shows rates 74-86. Continue metoprolol will transition to XL in am. Continue eliquis for anticoagulation     -Acute systolic congestive heart failure: LVEF 36-40% on Echo 1/8/2025. Continue metoprolol will transition to XL in am. Not a good candidate for ACEI/ARB/ARNI or spironolactone as this time due to renal function and recent hyperkalemia. Continue hydralazine and isordil. Nephrology has been managing diuretics. Patient will follow up with CHF clinic on discharge    -Coronary disease: s/p CABG. Patient has complained of chest pain. Today he reports more to epigastric area.     -Acute on chronic renal insufficiency: Cr 2.71 today. Nephrology has been following and managing diuretics     -Hypertension: Controlled    -Hyperlipidemia: LDL goal <70.    -Type 2 DM    -Obstructive sleep apnea    -Anemia: Hgb 8.3    -Obesity     Plan:   - continue eliquis  - Continue metoprolol will transition to XL in am.  - Continue hydralazine and isordil  - Nephrology managing diurectics  - continue to monitor Telemetry   - continue to monitor I/O and daily weights  - continue to monitor kidney function and electrolytes    Electronically signed by SUSAN De Dios, 01/10/25, 11:55 AM CST.

## 2025-01-10 NOTE — PLAN OF CARE
Goal Outcome Evaluation:  Plan of Care Reviewed With: patient        Progress: improving  Outcome Evaluation: pt in chair, c/o stomach pain 8/10, trans sit-stand sba, pt amb 400 feet rwx cga-sba, trans to chair sba, BLE AROM x 20 reps
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
## 2025-01-10 NOTE — CASE MANAGEMENT/SOCIAL WORK
Continued Stay Note   [facility]     Patient Name: Erick Luong  MRN: [id number]  Today's Date: 1/10/2025    Admit Date: 1/6/2025        Discharge Plan       Row Name 01/10/25 0834       Plan    Plan Comments plan is still to d/c home. No new needs identified at this time.                   Discharge Codes    No documentation.                       Ralf De La Cruz

## 2025-01-11 LAB
ALBUMIN SERPL-MCNC: 3 G/DL (ref 3.5–5.2)
ALBUMIN/GLOB SERPL: 0.8 G/DL
ALP SERPL-CCNC: 160 U/L (ref 39–117)
ALT SERPL W P-5'-P-CCNC: 14 U/L (ref 1–41)
ANION GAP SERPL CALCULATED.3IONS-SCNC: 16 MMOL/L (ref 5–15)
AST SERPL-CCNC: 14 U/L (ref 1–40)
BACTERIA SPEC AEROBE CULT: NORMAL
BACTERIA SPEC AEROBE CULT: NORMAL
BILIRUB SERPL-MCNC: 0.4 MG/DL (ref 0–1.2)
BUN SERPL-MCNC: 64 MG/DL (ref 8–23)
BUN/CREAT SERPL: 21.9 (ref 7–25)
CALCIUM SPEC-SCNC: 9.2 MG/DL (ref 8.6–10.5)
CHLORIDE SERPL-SCNC: 103 MMOL/L (ref 98–107)
CO2 SERPL-SCNC: 19 MMOL/L (ref 22–29)
CREAT SERPL-MCNC: 2.92 MG/DL (ref 0.76–1.27)
DEPRECATED RDW RBC AUTO: 49.9 FL (ref 37–54)
EGFRCR SERPLBLD CKD-EPI 2021: 22.7 ML/MIN/1.73
ERYTHROCYTE [DISTWIDTH] IN BLOOD BY AUTOMATED COUNT: 14.6 % (ref 12.3–15.4)
GLOBULIN UR ELPH-MCNC: 4 GM/DL
GLUCOSE BLDC GLUCOMTR-MCNC: 166 MG/DL (ref 70–130)
GLUCOSE BLDC GLUCOMTR-MCNC: 229 MG/DL (ref 70–130)
GLUCOSE BLDC GLUCOMTR-MCNC: 257 MG/DL (ref 70–130)
GLUCOSE BLDC GLUCOMTR-MCNC: 97 MG/DL (ref 70–130)
GLUCOSE SERPL-MCNC: 187 MG/DL (ref 65–99)
HCT VFR BLD AUTO: 32.7 % (ref 37.5–51)
HGB BLD-MCNC: 10 G/DL (ref 13–17.7)
MAGNESIUM SERPL-MCNC: 2.3 MG/DL (ref 1.6–2.4)
MCH RBC QN AUTO: 28.9 PG (ref 26.6–33)
MCHC RBC AUTO-ENTMCNC: 30.6 G/DL (ref 31.5–35.7)
MCV RBC AUTO: 94.5 FL (ref 79–97)
PLATELET # BLD AUTO: 419 10*3/MM3 (ref 140–450)
PMV BLD AUTO: 11.8 FL (ref 6–12)
POTASSIUM SERPL-SCNC: 4.9 MMOL/L (ref 3.5–5.2)
PROT SERPL-MCNC: 7 G/DL (ref 6–8.5)
RBC # BLD AUTO: 3.46 10*6/MM3 (ref 4.14–5.8)
SODIUM SERPL-SCNC: 138 MMOL/L (ref 136–145)
WBC NRBC COR # BLD AUTO: 8.89 10*3/MM3 (ref 3.4–10.8)

## 2025-01-11 PROCEDURE — 83735 ASSAY OF MAGNESIUM: CPT | Performed by: NURSE PRACTITIONER

## 2025-01-11 PROCEDURE — 25010000002 MORPHINE PER 10 MG: Performed by: HOSPITALIST

## 2025-01-11 PROCEDURE — 63710000001 INSULIN LISPRO (HUMAN) PER 5 UNITS: Performed by: HOSPITALIST

## 2025-01-11 PROCEDURE — 99232 SBSQ HOSP IP/OBS MODERATE 35: CPT | Performed by: INTERNAL MEDICINE

## 2025-01-11 PROCEDURE — 25010000002 BUMETANIDE PER 0.5 MG: Performed by: HOSPITALIST

## 2025-01-11 PROCEDURE — 85027 COMPLETE CBC AUTOMATED: CPT | Performed by: NURSE PRACTITIONER

## 2025-01-11 PROCEDURE — 97116 GAIT TRAINING THERAPY: CPT

## 2025-01-11 PROCEDURE — 80053 COMPREHEN METABOLIC PANEL: CPT | Performed by: NURSE PRACTITIONER

## 2025-01-11 PROCEDURE — 82948 REAGENT STRIP/BLOOD GLUCOSE: CPT

## 2025-01-11 RX ORDER — OXYCODONE HYDROCHLORIDE 5 MG/1
10 TABLET ORAL EVERY 12 HOURS PRN
Status: CANCELLED | OUTPATIENT
Start: 2025-01-11 | End: 2025-01-16

## 2025-01-11 RX ORDER — ROSUVASTATIN CALCIUM 10 MG/1
10 TABLET, COATED ORAL NIGHTLY
Status: DISCONTINUED | OUTPATIENT
Start: 2025-01-11 | End: 2025-01-13 | Stop reason: HOSPADM

## 2025-01-11 RX ORDER — OXYCODONE HYDROCHLORIDE 5 MG/1
5 TABLET ORAL EVERY 6 HOURS PRN
Status: CANCELLED | OUTPATIENT
Start: 2025-01-11 | End: 2025-01-16

## 2025-01-11 RX ADMIN — ISOSORBIDE DINITRATE 10 MG: 10 TABLET ORAL at 17:49

## 2025-01-11 RX ADMIN — MORPHINE SULFATE 2 MG: 2 INJECTION, SOLUTION INTRAMUSCULAR; INTRAVENOUS at 06:33

## 2025-01-11 RX ADMIN — DOCUSATE SODIUM 50 MG AND SENNOSIDES 8.6 MG 2 TABLET: 8.6; 5 TABLET, FILM COATED ORAL at 21:10

## 2025-01-11 RX ADMIN — PANTOPRAZOLE SODIUM 40 MG: 40 TABLET, DELAYED RELEASE ORAL at 21:10

## 2025-01-11 RX ADMIN — EMPAGLIFLOZIN 10 MG: 10 TABLET, FILM COATED ORAL at 08:49

## 2025-01-11 RX ADMIN — SERTRALINE HYDROCHLORIDE 25 MG: 25 TABLET ORAL at 08:48

## 2025-01-11 RX ADMIN — CALCITRIOL CAPSULES 0.25 MCG 0.25 MCG: 0.25 CAPSULE ORAL at 08:48

## 2025-01-11 RX ADMIN — BISACODYL 5 MG: 5 TABLET, COATED ORAL at 19:31

## 2025-01-11 RX ADMIN — DOCUSATE SODIUM 50 MG AND SENNOSIDES 8.6 MG 2 TABLET: 8.6; 5 TABLET, FILM COATED ORAL at 08:49

## 2025-01-11 RX ADMIN — LACTULOSE 30 G: 20 SOLUTION ORAL at 08:49

## 2025-01-11 RX ADMIN — BUSPIRONE HYDROCHLORIDE 10 MG: 10 TABLET ORAL at 19:31

## 2025-01-11 RX ADMIN — Medication 10 ML: at 08:50

## 2025-01-11 RX ADMIN — LACTULOSE 30 G: 20 SOLUTION ORAL at 17:49

## 2025-01-11 RX ADMIN — HYDRALAZINE HYDROCHLORIDE 10 MG: 10 TABLET ORAL at 06:22

## 2025-01-11 RX ADMIN — ISOSORBIDE DINITRATE 10 MG: 10 TABLET ORAL at 08:49

## 2025-01-11 RX ADMIN — OXYCODONE HYDROCHLORIDE 10 MG: 5 TABLET ORAL at 19:31

## 2025-01-11 RX ADMIN — BUMETANIDE 1 MG: 0.25 INJECTION INTRAMUSCULAR; INTRAVENOUS at 06:22

## 2025-01-11 RX ADMIN — BUSPIRONE HYDROCHLORIDE 10 MG: 10 TABLET ORAL at 07:39

## 2025-01-11 RX ADMIN — DONEPEZIL HYDROCHLORIDE 10 MG: 10 TABLET, FILM COATED ORAL at 21:10

## 2025-01-11 RX ADMIN — METOPROLOL SUCCINATE 50 MG: 50 TABLET, FILM COATED, EXTENDED RELEASE ORAL at 08:48

## 2025-01-11 RX ADMIN — APIXABAN 5 MG: 5 TABLET, FILM COATED ORAL at 08:48

## 2025-01-11 RX ADMIN — ROSUVASTATIN 10 MG: 10 TABLET, FILM COATED ORAL at 21:10

## 2025-01-11 RX ADMIN — MORPHINE SULFATE 2 MG: 2 INJECTION, SOLUTION INTRAMUSCULAR; INTRAVENOUS at 01:43

## 2025-01-11 RX ADMIN — PANTOPRAZOLE SODIUM 40 MG: 40 TABLET, DELAYED RELEASE ORAL at 08:48

## 2025-01-11 RX ADMIN — INSULIN LISPRO 6 UNITS: 100 INJECTION, SOLUTION INTRAVENOUS; SUBCUTANEOUS at 12:14

## 2025-01-11 RX ADMIN — INSULIN LISPRO 2 UNITS: 100 INJECTION, SOLUTION INTRAVENOUS; SUBCUTANEOUS at 08:49

## 2025-01-11 RX ADMIN — Medication 10 ML: at 21:34

## 2025-01-11 RX ADMIN — POLYETHYLENE GLYCOL 3350 17 G: 17 POWDER, FOR SOLUTION ORAL at 08:53

## 2025-01-11 RX ADMIN — HYDRALAZINE HYDROCHLORIDE 10 MG: 10 TABLET ORAL at 21:10

## 2025-01-11 RX ADMIN — LACTULOSE 30 G: 20 SOLUTION ORAL at 21:10

## 2025-01-11 RX ADMIN — APIXABAN 5 MG: 5 TABLET, FILM COATED ORAL at 21:10

## 2025-01-11 RX ADMIN — INSULIN LISPRO 4 UNITS: 100 INJECTION, SOLUTION INTRAVENOUS; SUBCUTANEOUS at 21:34

## 2025-01-11 RX ADMIN — ISOSORBIDE DINITRATE 10 MG: 10 TABLET ORAL at 14:53

## 2025-01-11 NOTE — PROGRESS NOTES
[facility] -  Progress Note     LOS: 3 days   Patient Care Team:  Del Shetty MD as PCP - General (Family Medicine)  Emeka Garay MD as Cardiologist (Cardiology)  Cristopher Hannah MD as Consulting Physician (Gastroenterology)  Brian Lomas MD as Consulting Physician (Nephrology)  Karel Soliz MD as Surgeon (Neurosurgery)  Willy Romero APRN as Nurse Practitioner (Nurse Practitioner)  Dougie Taylor MD as Consulting Physician (Pulmonary Disease)    Chief Complaint:  F/U CHF    Subjective   Patient is resting in bed on RA. Denies any shortness of breath. No complaint of chest pain or pressure.       REVIEW OF SYSTEMS:  Constitutional: Denies fever or chills. Denies change in energy level or malaise.  HEENT: Denies headaches or visual disturbances. Denies difficulty swallowing or sore throat.  Respiratory: Improvement of shortness of breath.   Cardiovascular: Denies chest pain or pressure. Denies palpitations. Denies presyncope/syncope. Denies orthopnea/PND. Denies lower extremity edema.   Gastrointestinal: Denies abdominal pain. Denies nausea/vomiting. Denies change in bowel habits or history of recent GI tract blood loss.   Genitourinary: Denies urinary urgency or frequency. Denies dysuria, hematuria.  Musculoskeletal: Denies pain or swelling in joints.  Neurological: Denies paresthesias. Denies headache. Denies seizure or stroke symptoms.  Behavioral/Psych: Denies problems with anxiety or depression.    All other systems are negative except where stated above.      Objective     Vital Sign Min/Max for last 24 hours  Temp  Min: 97.5 °F (36.4 °C)  Max: 98.1 °F (36.7 °C)   BP  Min: 112/53  Max: 132/71   Pulse  Min: 65  Max: 83   Resp  Min: 16  Max: 20   SpO2  Min: 96 %  Max: 100 %   No data recorded   Weight  Min: 121 kg (267 lb)  Max: 121 kg (267 lb)         01/11/25  0600   Weight: 121 kg (267 lb)         Intake/Output Summary (Last 24 hours) at 1/11/2025  1059  Last data filed at 1/11/2025 0600  Gross per 24 hour   Intake 240 ml   Output 1200 ml   Net -960 ml         Physical Exam:    General Appearance:  Alert, cooperative, in no acute distress   HEENT:  Normocephalic. Atraumatic. YUE.   Lungs:  Clear to auscultation, respirations regular, even and unlabored    Heart:  Irregular rhythm and normal rate, normal S1 and S2, no murmur, no gallop, no rub, no click   Abdomen:  Normal bowel sounds, soft, non-tender, non-distended   Extremities:  Moves all extremities, trace BLE edema, no cyanosis, no redness   Pulses:  Pulses palpable and equal bilaterally   Skin:  No bleeding, bruising or rash   Neurologic:  Grossly intact     Results Review:   Lab Results (last 72 hours)       Procedure Component Value Units Date/Time    Comprehensive Metabolic Panel [[id number]]  (Abnormal) Collected: 01/11/25 0756    Specimen: Blood Updated: 01/11/25 0847     Glucose 187 mg/dL      BUN 64 mg/dL      Creatinine 2.92 mg/dL      Sodium 138 mmol/L      Potassium 4.9 mmol/L      Comment: Slight hemolysis detected by analyzer. Result may be falsely elevated.        Chloride 103 mmol/L      CO2 19.0 mmol/L      Calcium 9.2 mg/dL      Total Protein 7.0 g/dL      Albumin 3.0 g/dL      ALT (SGPT) 14 U/L      AST (SGOT) 14 U/L      Alkaline Phosphatase 160 U/L      Total Bilirubin 0.4 mg/dL      Globulin 4.0 gm/dL      A/G Ratio 0.8 g/dL      BUN/Creatinine Ratio 21.9     Anion Gap 16.0 mmol/L      eGFR 22.7 mL/min/1.73     Narrative:      GFR Categories in Chronic Kidney Disease (CKD)      GFR Category          GFR (mL/min/1.73)    Interpretation  G1                     90 or greater         Normal or high (1)  G2                      60-89                Mild decrease (1)  G3a                   45-59                Mild to moderate decrease  G3b                   30-44                Moderate to severe decrease  G4                    15-29                Severe decrease  G5                    14  or less           Kidney failure          (1)In the absence of evidence of kidney disease, neither GFR category G1 or G2 fulfill the criteria for CKD.    eGFR calculation 2021 CKD-EPI creatinine equation, which does not include race as a factor    Magnesium [[id number]]  (Normal) Collected: 01/11/25 0756    Specimen: Blood Updated: 01/11/25 0847     Magnesium 2.3 mg/dL     POC Glucose Once [[id number]]  (Abnormal) Collected: 01/11/25 0808    Specimen: Blood Updated: 01/11/25 0834     Glucose 166 mg/dL      Comment: : [id number] [employer] CorporationMeter ID: [id number]       CBC (No Diff) [[id number]]  (Abnormal) Collected: 01/11/25 0756    Specimen: Blood Updated: 01/11/25 0830     WBC 8.89 10*3/mm3      RBC 3.46 10*6/mm3      Hemoglobin 10.0 g/dL      Hematocrit 32.7 %      MCV 94.5 fL      MCH 28.9 pg      MCHC 30.6 g/dL      RDW 14.6 %      RDW-SD 49.9 fl      MPV 11.8 fL      Platelets 419 10*3/mm3     POC Glucose Once [[id number]]  (Abnormal) Collected: 01/10/25 2135    Specimen: Blood Updated: 01/10/25 2147     Glucose 328 mg/dL      Comment: : aglassSergio Ramirez ID: [id number]       POC Glucose Once [[id number]]  (Abnormal) Collected: 01/09/25 1656    Specimen: Blood Updated: 01/10/25 2123     Glucose 233 mg/dL      Comment: : [id number] [employer]Meter ID: [id number]       Blood Culture - Blood, Arm, Right [[id number]]  (Normal) Collected: 01/06/25 1700    Specimen: Blood from Arm, Right Updated: 01/10/25 1715     Blood Culture No growth at 4 days    Blood Culture - Blood, Arm, Left [[id number]]  (Normal) Collected: 01/06/25 1648    Specimen: Blood from Arm, Left Updated: 01/10/25 1715     Blood Culture No growth at 4 days    POC Glucose Once [[id number]]  (Abnormal) Collected: 01/10/25 1646    Specimen: Blood Updated: 01/10/25 1704     Glucose 475 mg/dL      Comment: : [id number] Alexis LisaMeter ID: [id number]       POC Glucose Once [[id number]]  (Abnormal) Collected: 01/10/25 1112     Specimen: Blood Updated: 01/10/25 1122     Glucose 205 mg/dL      Comment: : [id number] Chris AprilMeter ID: [id number]       POC Glucose Once [[id number]]  (Abnormal) Collected: 01/10/25 0745    Specimen: Blood Updated: 01/10/25 0826     Glucose 261 mg/dL      Comment: : [id number] Henry HeatherMeter ID: [id number]       Comprehensive Metabolic Panel [[id number]]  (Abnormal) Collected: 01/10/25 0150    Specimen: Blood Updated: 01/10/25 0234     Glucose 248 mg/dL      BUN 66 mg/dL      Creatinine 2.71 mg/dL      Sodium 139 mmol/L      Potassium 5.1 mmol/L      Chloride 104 mmol/L      CO2 23.0 mmol/L      Calcium 8.8 mg/dL      Total Protein 6.2 g/dL      Albumin 3.1 g/dL      ALT (SGPT) 15 U/L      AST (SGOT) 12 U/L      Alkaline Phosphatase 142 U/L      Total Bilirubin 0.3 mg/dL      Globulin 3.1 gm/dL      A/G Ratio 1.0 g/dL      BUN/Creatinine Ratio 24.4     Anion Gap 12.0 mmol/L      eGFR 24.8 mL/min/1.73     Narrative:      GFR Categories in Chronic Kidney Disease (CKD)      GFR Category          GFR (mL/min/1.73)    Interpretation  G1                     90 or greater         Normal or high (1)  G2                      60-89                Mild decrease (1)  G3a                   45-59                Mild to moderate decrease  G3b                   30-44                Moderate to severe decrease  G4                    15-29                Severe decrease  G5                    14 or less           Kidney failure          (1)In the absence of evidence of kidney disease, neither GFR category G1 or G2 fulfill the criteria for CKD.    eGFR calculation 2021 CKD-EPI creatinine equation, which does not include race as a factor    CBC & Differential [[id number]]  (Abnormal) Collected: 01/10/25 0151    Specimen: Blood Updated: 01/10/25 0212    Narrative:      The following orders were created for panel order CBC & Differential.  Procedure                               Abnormality         Status                      ---------                               -----------         ------                     CBC Auto Differential[[id number]]        Abnormal            Final result                 Please view results for these tests on the individual orders.    CBC Auto Differential [[id number]]  (Abnormal) Collected: 01/10/25 0151    Specimen: Blood Updated: 01/10/25 0212     WBC 8.05 10*3/mm3      RBC 2.92 10*6/mm3      Hemoglobin 8.3 g/dL      Hematocrit 26.3 %      MCV 90.1 fL      MCH 28.4 pg      MCHC 31.6 g/dL      RDW 14.4 %      RDW-SD 46.6 fl      MPV 11.5 fL      Platelets 335 10*3/mm3      Neutrophil % 62.0 %      Lymphocyte % 13.0 %      Monocyte % 11.8 %      Eosinophil % 11.1 %      Basophil % 0.7 %      Immature Grans % 1.4 %      Neutrophils, Absolute 4.99 10*3/mm3      Lymphocytes, Absolute 1.05 10*3/mm3      Monocytes, Absolute 0.95 10*3/mm3      Eosinophils, Absolute 0.89 10*3/mm3      Basophils, Absolute 0.06 10*3/mm3      Immature Grans, Absolute 0.11 10*3/mm3      nRBC 0.0 /100 WBC     POC Glucose Once [[id number]]  (Abnormal) Collected: 01/09/25 1914    Specimen: Blood Updated: 01/09/25 1930     Glucose 320 mg/dL      Comment: : [id number] Crisp RebeccaMeter ID: [id number]       POC Glucose Once [[id number]]  (Abnormal) Collected: 01/09/25 1120    Specimen: Blood Updated: 01/09/25 1131     Glucose 241 mg/dL      Comment: : [id number] Figueroa HeatherMeter ID: [id number]       POC Glucose Once [[id number]]  (Abnormal) Collected: 01/09/25 0728    Specimen: Blood Updated: 01/09/25 0744     Glucose 258 mg/dL      Comment: : [id number] Figueroa HeatherMeter ID: [id number]       POC Glucose Once [[id number]]  (Abnormal) Collected: 01/09/25 0608    Specimen: Blood Updated: 01/09/25 0619     Glucose 276 mg/dL      Comment: : [id number] Crisp RebeccaMeter ID: [id number]       High Sensitivity Troponin T 1Hr [[id number]]  (Abnormal) Collected: 01/09/25 0505    Specimen: Blood Updated: 01/09/25 0549     HS Troponin  T 66 ng/L      Troponin T Numeric Delta 2 ng/L      Troponin T % Delta 3 %     Narrative:      High Sensitive Troponin T Reference Range:  <14.0 ng/L- Negative Female for AMI  <22.0 ng/L- Negative Male for AMI  >=14 - Abnormal Female indicating possible myocardial injury.  >=22 - Abnormal Male indicating possible myocardial injury.   Clinicians would have to utilize clinical acumen, EKG, Troponin, and serial changes to determine if it is an Acute Myocardial Infarction or myocardial injury due to an underlying chronic condition.         Basic Metabolic Panel [[id number]]  (Abnormal) Collected: 01/09/25 0505    Specimen: Blood Updated: 01/09/25 0545     Glucose 277 mg/dL      BUN 63 mg/dL      Creatinine 2.68 mg/dL      Sodium 137 mmol/L      Potassium 5.0 mmol/L      Chloride 102 mmol/L      CO2 19.0 mmol/L      Calcium 8.9 mg/dL      BUN/Creatinine Ratio 23.5     Anion Gap 16.0 mmol/L      eGFR 25.1 mL/min/1.73     Narrative:      GFR Categories in Chronic Kidney Disease (CKD)      GFR Category          GFR (mL/min/1.73)    Interpretation  G1                     90 or greater         Normal or high (1)  G2                      60-89                Mild decrease (1)  G3a                   45-59                Mild to moderate decrease  G3b                   30-44                Moderate to severe decrease  G4                    15-29                Severe decrease  G5                    14 or less           Kidney failure          (1)In the absence of evidence of kidney disease, neither GFR category G1 or G2 fulfill the criteria for CKD.    eGFR calculation 2021 CKD-EPI creatinine equation, which does not include race as a factor    Magnesium [[id number]]  (Normal) Collected: 01/09/25 0345    Specimen: Blood Updated: 01/09/25 0443     Magnesium 2.1 mg/dL     High Sensitivity Troponin T [[id number]]  (Abnormal) Collected: 01/09/25 0345    Specimen: Blood Updated: 01/09/25 0429     HS Troponin T 64 ng/L      Comment:  Specimen hemolyzed.  Results may be falsely decreased.       Narrative:      High Sensitive Troponin T Reference Range:  <14.0 ng/L- Negative Female for AMI  <22.0 ng/L- Negative Male for AMI  >=14 - Abnormal Female indicating possible myocardial injury.  >=22 - Abnormal Male indicating possible myocardial injury.   Clinicians would have to utilize clinical acumen, EKG, Troponin, and serial changes to determine if it is an Acute Myocardial Infarction or myocardial injury due to an underlying chronic condition.         Lipase [[id number]]  (Abnormal) Collected: 01/09/25 0345    Specimen: Blood Updated: 01/09/25 0428     Lipase 12 U/L      Comment: Specimen hemolyzed.  Results may be falsely decreased.       Comprehensive Metabolic Panel [[id number]]  (Abnormal) Collected: 01/09/25 0345    Specimen: Blood Updated: 01/09/25 0428     Glucose 282 mg/dL      BUN 62 mg/dL      Creatinine 2.87 mg/dL      Sodium 137 mmol/L      Potassium 6.0 mmol/L      Comment: Specimen hemolyzed.  Result may be falsely elevated.        Chloride 100 mmol/L      CO2 21.0 mmol/L      Calcium 9.1 mg/dL      Total Protein 7.4 g/dL      Albumin 3.4 g/dL      ALT (SGPT) 21 U/L      Comment: Specimen hemolyzed.  Result may  be falsely elevated.        AST (SGOT) 21 U/L      Comment: Specimen hemolyzed.  Result may be falsely elevated.        Alkaline Phosphatase 169 U/L      Total Bilirubin 0.4 mg/dL      Globulin 4.0 gm/dL      A/G Ratio 0.9 g/dL      BUN/Creatinine Ratio 21.6     Anion Gap 16.0 mmol/L      eGFR 23.1 mL/min/1.73     Narrative:      GFR Categories in Chronic Kidney Disease (CKD)      GFR Category          GFR (mL/min/1.73)    Interpretation  G1                     90 or greater         Normal or high (1)  G2                      60-89                Mild decrease (1)  G3a                   45-59                Mild to moderate decrease  G3b                   30-44                Moderate to severe decrease  G4                     15-29                Severe decrease  G5                    14 or less           Kidney failure          (1)In the absence of evidence of kidney disease, neither GFR category G1 or G2 fulfill the criteria for CKD.    eGFR calculation 2021 CKD-EPI creatinine equation, which does not include race as a factor    CBC & Differential [[id number]]  (Abnormal) Collected: 01/09/25 0345    Specimen: Blood Updated: 01/09/25 0403    Narrative:      The following orders were created for panel order CBC & Differential.  Procedure                               Abnormality         Status                     ---------                               -----------         ------                     CBC Auto Differential[[id number]]        Abnormal            Final result                 Please view results for these tests on the individual orders.    CBC Auto Differential [[id number]]  (Abnormal) Collected: 01/09/25 0345    Specimen: Blood Updated: 01/09/25 0403     WBC 9.04 10*3/mm3      RBC 3.27 10*6/mm3      Hemoglobin 9.2 g/dL      Hematocrit 29.9 %      MCV 91.4 fL      MCH 28.1 pg      MCHC 30.8 g/dL      RDW 14.4 %      RDW-SD 47.8 fl      MPV 11.7 fL      Platelets 380 10*3/mm3      Neutrophil % 64.5 %      Lymphocyte % 13.1 %      Monocyte % 10.5 %      Eosinophil % 9.8 %      Basophil % 1.0 %      Immature Grans % 1.1 %      Neutrophils, Absolute 5.83 10*3/mm3      Lymphocytes, Absolute 1.18 10*3/mm3      Monocytes, Absolute 0.95 10*3/mm3      Eosinophils, Absolute 0.89 10*3/mm3      Basophils, Absolute 0.09 10*3/mm3      Immature Grans, Absolute 0.10 10*3/mm3      nRBC 0.0 /100 WBC     POC Glucose Once [[id number]]  (Abnormal) Collected: 01/08/25 2013    Specimen: Blood Updated: 01/08/25 2029     Glucose 370 mg/dL      Comment: : [id number] Crisp RebeccaMeter ID: [id number]       POC Glucose Once [[id number]]  (Abnormal) Collected: 01/08/25 1623    Specimen: Blood Updated: 01/08/25 1657     Glucose 286 mg/dL      Comment:  : [id number] Navya NazariorpeMeter ID: [id number]       POC Glucose Once [[id number]]  (Abnormal) Collected: 01/08/25 0731    Specimen: Blood Updated: 01/08/25 1253     Glucose 227 mg/dL      Comment: : [id number] Navya NazariorpeMeter ID: [id number]       POC Glucose Once [[id number]]  (Abnormal) Collected: 01/08/25 1119    Specimen: Blood Updated: 01/08/25 1139     Glucose 273 mg/dL      Comment: : [id number] Navya NazariorpeMeter ID: [id number]                   2D Echocardiogram:    Left ventricular systolic function is moderately decreased. Left ventricular ejection fraction appears to be 36 - 40%.    Left ventricular wall thickness is consistent with mild concentric hypertrophy.    Normal right ventricular cavity size noted. Mildly reduced right ventricular systolic function noted.    No significant valvular abnormalities identified on this study.      Medication Review: yes  Current Facility-Administered Medications   Medication Dose Route Frequency Provider Last Rate Last Admin    acetaminophen (TYLENOL) tablet 650 mg  650 mg Oral Q4H PRN Latanya Paez MD   650 mg at 01/09/25 0308    Or    acetaminophen (TYLENOL) 160 MG/5ML oral solution 650 mg  650 mg Oral Q4H PRN Latanya Paez MD        Or    acetaminophen (TYLENOL) suppository 650 mg  650 mg Rectal Q4H PRN Latanya Paez MD        apixaban (ELIQUIS) tablet 5 mg  5 mg Oral BID Latanya Paez MD   5 mg at 01/11/25 0848    sennosides-docusate (PERICOLACE) 8.6-50 MG per tablet 2 tablet  2 tablet Oral BID PRN Latanya Paez MD   2 tablet at 01/11/25 0849    And    polyethylene glycol (MIRALAX) packet 17 g  17 g Oral Daily PRN Latanya Paez MD   17 g at 01/11/25 0853    And    bisacodyl (DULCOLAX) EC tablet 5 mg  5 mg Oral Daily PRN Latanya Paez MD        And    bisacodyl (DULCOLAX) suppository 10 mg  10 mg Rectal Daily PRN Latanya Paez MD   10 mg at 01/09/25 0328    [Held by provider] bumetanide (BUMEX) injection 1 mg  1 mg Intravenous Q12H  Latanya Paez MD   1 mg at 01/11/25 0622    busPIRone (BUSPAR) tablet 10 mg  10 mg Oral TID PRN Latanya Paez MD   10 mg at 01/11/25 0739    calcitriol (ROCALTROL) capsule 0.25 mcg  0.25 mcg Oral Daily Vinny Hartley MD   0.25 mcg at 01/11/25 0848    Calcium Replacement - Follow Nurse / BPA Driven Protocol   Not Applicable PRN Latanya Paez MD        dextrose (D50W) (25 g/50 mL) IV injection 25 g  25 g Intravenous Q15 Min PRN Latanya Paez MD        dextrose (GLUTOSE) oral gel 15 g  15 g Oral Q15 Min PRN Latanya Paez MD        donepezil (ARICEPT) tablet 10 mg  10 mg Oral Nightly Latanya Paez MD   10 mg at 01/10/25 2118    empagliflozin (JARDIANCE) tablet 10 mg  10 mg Oral Daily Pepe Barajas MD   10 mg at 01/11/25 0849    glucagon (GLUCAGEN) injection 1 mg  1 mg Intramuscular Q15 Min PRN Latanya Paez MD        hydrALAZINE (APRESOLINE) tablet 10 mg  10 mg Oral Q8H Donna Roman APRN   10 mg at 01/11/25 0622    Insulin Lispro (humaLOG) injection 2-9 Units  2-9 Units Subcutaneous 4x Daily AC & at Bedtime Latanya Paez MD   2 Units at 01/11/25 0849    ipratropium-albuterol (DUO-NEB) nebulizer solution 3 mL  3 mL Nebulization 4x Daily PRN Latanya Paez MD        isosorbide dinitrate (ISORDIL) tablet 10 mg  10 mg Oral TID - Nitrates Donna Roman APRN   10 mg at 01/11/25 0849    lactulose solution 30 g  30 g Oral TID Latanya Paez MD   30 g at 01/11/25 0849    Magnesium Standard Dose Replacement - Follow Nurse / BPA Driven Protocol   Not Applicable PRN Latanya Paez MD        metoprolol succinate XL (TOPROL-XL) 24 hr tablet 50 mg  50 mg Oral Q24H Eezkiel Roman APRN   50 mg at 01/11/25 0848    Morphine sulfate (PF) injection 2 mg  2 mg Intravenous Q4H PRN Latanya Paez MD   2 mg at 01/11/25 0633    And    naloxone (NARCAN) injection 0.4 mg  0.4 mg Intravenous Q5 Min PRN Latanya Paez MD        nitroglycerin (NITROSTAT) SL tablet 0.4 mg  0.4 mg Sublingual Q5 Min PRN Jeannine  MD Latanya   0.4 mg at 01/07/25 2353    ondansetron (ZOFRAN) injection 4 mg  4 mg Intravenous Q6H PRN Latanya Paez MD   4 mg at 01/10/25 1703    oxyCODONE (ROXICODONE) immediate release tablet 10 mg  10 mg Oral Q12H PRN Latanya Paez MD   10 mg at 01/10/25 2350    pantoprazole (PROTONIX) EC tablet 40 mg  40 mg Oral BID Latanya Paez MD   40 mg at 01/11/25 0848    Phosphorus Replacement - Follow Nurse / BPA Driven Protocol   Not Applicable PRN Latanya Paez MD        Potassium Replacement - Follow Nurse / BPA Driven Protocol   Not Applicable PRLatanya Cid MD        sertraline (ZOLOFT) tablet 25 mg  25 mg Oral Daily Deborah Real APRN   25 mg at 01/11/25 0848    sodium chloride 0.9 % flush 10 mL  10 mL Intravenous Q12H Latanya Paez MD   10 mL at 01/11/25 0850    sodium chloride 0.9 % flush 10 mL  10 mL Intravenous PRN Latanya Paez MD        sodium chloride 0.9 % infusion 40 mL  40 mL Intravenous PRN Latanya Paez MD             Assessment & Plan   1.  Acute systolic CHF: improved. Patient laying fairly flat on RA. Noted increase in CR with IV bumex BID, therefore, will hold and recheck BMP in AM. Patient is down net 6 L since admission, down 6 lb from stated weight. Continue metoprolol succinate 50 mg daily, Jardiance 10 mg daily. Continue to monitor strict I/O, daily weight and renal function.     2.  Permanent atrial fibrillation - rate controlled. Anticoagulated with Eliquis 5 mg BID.     3.  Coronary artery disease: History of CABG. No complaint of chest pain or pressure. On Eliquis 5 mg BID, Isordil 10 mg TID, metoprolol succunate 50 mg daily, and rosuvastatin 10 mg nightly.     4.  Acute on chronic renal insufficiency: Creatinine increased up to 2.92 on 1/11/2025 from 2.71 today. Will hold Bumex IV. Recheck BMP in AM.     5.  Essential hypertension: Controlled    6.  Hyperlipidemia - on statin      Further orders per Dr. Pepe Barajas.       Michelle Valle, APRN  01/11/25  10:59  CST

## 2025-01-11 NOTE — PLAN OF CARE
Goal Outcome Evaluation:         Patient stands and ambulates 400' with Rwx. Tolerates activity well.

## 2025-01-11 NOTE — PROGRESS NOTES
Nephrology ([facility]) progress Note      Patient:  Erick Luong  YOB: 1955  Date of Service: 1/11/2025  MRN: [id number]   Acct: [id number]   Primary Care Physician: Del Shetty MD  Advance Directive:   Code Status and Medical Interventions: CPR (Attempt to Resuscitate); Full Support   Ordered at: 01/06/25 1800     Code Status (Patient has no pulse and is not breathing):    CPR (Attempt to Resuscitate)     Medical Interventions (Patient has pulse or is breathing):    Full Support     Admit Date: 1/6/2025       Hospital Day: 3  Referring Provider: Latanya Paez MD      Patient personally seen and examined.  Complete chart including Consults, Notes, Operative Reports, Labs, Cardiology, and Radiology studies reviewed as able.        Subjective:  Erick Luong is a 69 y.o. male for whom we were consulted for evaluation and treatment of acute kidney injury. Baseline chronic kidney disease stage 3b, baseline creatinine approximately 1.8.  Follows with Dr Lomas in our office. History of poorly controlled dialysis, hypertension, coronary artery disease, chronic diastolic CHF, BRITTNI, diabetic foot ulcer, obesity. On 1/06 was sent to ER from PCP office with rapid heart rate and dyspnea. In ER labs revealed creatinine 3.0. Noted to be in atrial fibrillation with RVR. Received IV Bumex and was admitted to medical floor. Currently is awake and alert. No dyspnea or pain at rest. Gets short of breath with any activity. Denies n/v/d. Urine output nonoliguric.   Today, he offered no new complaints. Has good urine output, has no significant leg edema. Still having poor appetite.     Allergies:  Cefepime, Bactrim [sulfamethoxazole-trimethoprim], Vancomycin, Zolpidem, and Metronidazole    Home Meds:  Medications Prior to Admission   Medication Sig Dispense Refill Last Dose/Taking    apixaban (Eliquis) 5 MG tablet tablet Take 1 tablet by mouth 2 (Two) Times a Day. 60 tablet 0 Taking     ascorbic acid (VITAMIN C) 1000 MG tablet Take 1 tablet by mouth Daily. 30 tablet 3 Taking    busPIRone (BUSPAR) 10 MG tablet Take 1 tablet by mouth 3 (Three) Times a Day As Needed (anxiety).   1/6/2025    donepezil (ARICEPT) 10 MG tablet Take 1 tablet by mouth every night at bedtime. 30 tablet 0 Taking    Insulin Glargine (Lantus SoloStar) 100 UNIT/ML injection pen Inject 20 units nightly as directed (Patient taking differently: Inject 20 Units under the skin into the appropriate area as directed Every Night.   Patient stated that he is still taking 35 units at bedtime) 15 mL 3 Taking Differently    oxyCODONE (ROXICODONE) 10 MG tablet Take 1 tablet by mouth Every 12 (Twelve) Hours As Needed for Moderate Pain.   Taking As Needed    pantoprazole (PROTONIX) 40 MG EC tablet Take 1 tablet by mouth 2 (Two) Times a Day. 90 tablet 4 Taking    potassium chloride ER (K-TAB) 20 MEQ tablet controlled-release ER tablet Take 1 tablet by mouth Daily. 30 tablet 0 Taking    rosuvastatin (CRESTOR) 10 MG tablet Take 1 tablet by mouth every night at bedtime. 30 tablet 2 Taking    sennosides-docusate (PERICOLACE) 8.6-50 MG per tablet Take 1 tablet by mouth Every Night. Obtain OTC   Taking    sertraline (ZOLOFT) 25 MG tablet Take 1 tablet by mouth Daily.   Taking    sodium hypochlorite (DAKIN'S 1/4 STRENGTH) 0.125 % solution topical solution 0.125% Apply  topically to the appropriate area as directed 2 (Two) Times a Day. 473 mL 5 Taking    tamsulosin (FLOMAX) 0.4 MG capsule 24 hr capsule Take 1 capsule by mouth Daily. 90 capsule 4 Taking    bumetanide (BUMEX) 2 MG tablet Take 1 tablet by mouth 2 (Two) Times a Day.       docusate sodium 100 MG capsule Take 1 capsule by mouth Daily.       famotidine (PEPCID) 20 MG tablet Take 1 tablet by mouth 2 (Two) Times a Day. 60 tablet 0 Unknown    Insulin Lispro (humaLOG) 100 UNIT/ML injection Inject 2-12 Units under the skin into the appropriate area as directed 4 (Four) Times a Day Before Meals &  at Bedtime. Inject subcutaneously four times a day per sliding scale:  0-150 = 0 units; 151-200 = 2u; 201-250 = 4u; 251-300 = 6u; 301-350 = 8u; 351-400 = 10u; 401+ = 12u       ipratropium-albuterol (DUO-NEB) 0.5-2.5 mg/3 ml nebulizer Take 3 mL by nebulization 4 (Four) Times a Day As Needed. (Patient taking differently: Take 3 mL by nebulization 4 (Four) Times a Day As Needed for Wheezing or Shortness of Air.) 360 mL 3     lisinopril (PRINIVIL,ZESTRIL) 5 MG tablet Take 1 tablet by mouth Daily. 30 tablet 0 Unknown    melatonin 3 MG tablet Take 2 tablets by mouth At Night As Needed for Sleep. 30 tablet 0     melatonin 3 MG tablet Take 1 tablet by mouth Daily. 30 tablet 0 Unknown    nitroglycerin (NITROSTAT) 0.4 MG SL tablet Place 1 tablet as needed under the tongue every 5 minutes. If no improvement after 3 tablets go to ER (Patient taking differently: Place 1 tablet under the tongue As Needed for Chest Pain.) 25 tablet 0     polyethylene glycol (MIRALAX) 17 GM/SCOOP powder Take 17 g by mouth Daily As Needed (constipation).   Unknown    pregabalin (LYRICA) 100 MG capsule Take 1 capsule by mouth 3 (Three) Times a Day.          Medicines:  Current Facility-Administered Medications   Medication Dose Route Frequency Provider Last Rate Last Admin    acetaminophen (TYLENOL) tablet 650 mg  650 mg Oral Q4H PRN Latanya Paez MD   650 mg at 01/09/25 0308    Or    acetaminophen (TYLENOL) 160 MG/5ML oral solution 650 mg  650 mg Oral Q4H PRN Latanya Paez MD        Or    acetaminophen (TYLENOL) suppository 650 mg  650 mg Rectal Q4H PRN Latanya Paez MD        apixaban (ELIQUIS) tablet 5 mg  5 mg Oral BID Latanya Paez MD   5 mg at 01/11/25 0848    sennosides-docusate (PERICOLACE) 8.6-50 MG per tablet 2 tablet  2 tablet Oral BID PRN Latanya Paez MD   2 tablet at 01/11/25 0849    And    polyethylene glycol (MIRALAX) packet 17 g  17 g Oral Daily PRN Latanya Paez MD   17 g at 01/11/25 0853    And    bisacodyl (DULCOLAX)  EC tablet 5 mg  5 mg Oral Daily PRN Latanya Paez MD        And    bisacodyl (DULCOLAX) suppository 10 mg  10 mg Rectal Daily PRN Latanya Paez MD   10 mg at 01/09/25 0328    [Held by provider] bumetanide (BUMEX) injection 1 mg  1 mg Intravenous Q12H Latanya Paez MD   1 mg at 01/11/25 0622    busPIRone (BUSPAR) tablet 10 mg  10 mg Oral TID PRN Latanya Paez MD   10 mg at 01/11/25 0739    calcitriol (ROCALTROL) capsule 0.25 mcg  0.25 mcg Oral Daily Vinny Hartley MD   0.25 mcg at 01/11/25 0848    Calcium Replacement - Follow Nurse / BPA Driven Protocol   Not Applicable PRN Latanya Paez MD        dextrose (D50W) (25 g/50 mL) IV injection 25 g  25 g Intravenous Q15 Min PRN Latanya Paez MD        dextrose (GLUTOSE) oral gel 15 g  15 g Oral Q15 Min PRN Latanya Paez MD        donepezil (ARICEPT) tablet 10 mg  10 mg Oral Nightly Latanya Paez MD   10 mg at 01/10/25 2118    empagliflozin (JARDIANCE) tablet 10 mg  10 mg Oral Daily Pepe Barajas MD   10 mg at 01/11/25 0849    glucagon (GLUCAGEN) injection 1 mg  1 mg Intramuscular Q15 Min PRN Latanya Paez MD        hydrALAZINE (APRESOLINE) tablet 10 mg  10 mg Oral Q8H Donna Roman APRN   10 mg at 01/11/25 0622    Insulin Lispro (humaLOG) injection 2-9 Units  2-9 Units Subcutaneous 4x Daily AC & at Bedtime Latanya Paez MD   6 Units at 01/11/25 1214    ipratropium-albuterol (DUO-NEB) nebulizer solution 3 mL  3 mL Nebulization 4x Daily PRN Latanya Paez MD        isosorbide dinitrate (ISORDIL) tablet 10 mg  10 mg Oral TID - Nitrates Donna Roman APRN   10 mg at 01/11/25 1453    lactulose solution 30 g  30 g Oral TID Latanya Paez MD   30 g at 01/11/25 0849    Magnesium Standard Dose Replacement - Follow Nurse / BPA Driven Protocol   Not Applicable PRN Latanya Paez MD        metoprolol succinate XL (TOPROL-XL) 24 hr tablet 50 mg  50 mg Oral Q24H Ezekiel Roman APRN   50 mg at 01/11/25 0848    Morphine sulfate (PF) injection 2  mg  2 mg Intravenous Q4H PRN Latanya Paez MD   2 mg at 01/11/25 0633    And    naloxone (NARCAN) injection 0.4 mg  0.4 mg Intravenous Q5 Min PRN Latanya Paez MD        nitroglycerin (NITROSTAT) SL tablet 0.4 mg  0.4 mg Sublingual Q5 Min PRN Latanya Paez MD   0.4 mg at 01/07/25 2353    ondansetron (ZOFRAN) injection 4 mg  4 mg Intravenous Q6H PRN Latanya Paez MD   4 mg at 01/10/25 1703    oxyCODONE (ROXICODONE) immediate release tablet 10 mg  10 mg Oral Q12H PRN Latanya Paez MD   10 mg at 01/10/25 2350    pantoprazole (PROTONIX) EC tablet 40 mg  40 mg Oral BID Latanya Paez MD   40 mg at 01/11/25 0848    Phosphorus Replacement - Follow Nurse / BPA Driven Protocol   Not Applicable PRN Latanya Paez MD        Potassium Replacement - Follow Nurse / BPA Driven Protocol   Not Applicable PRLatanya Cid MD        rosuvastatin (CRESTOR) tablet 10 mg  10 mg Oral Nightly Michelle Valle APRN        sertraline (ZOLOFT) tablet 25 mg  25 mg Oral Daily Deborah Real APRN   25 mg at 01/11/25 0848    sodium chloride 0.9 % flush 10 mL  10 mL Intravenous Q12H Latanya Paez MD   10 mL at 01/11/25 0850    sodium chloride 0.9 % flush 10 mL  10 mL Intravenous PRN Latanya Paez MD        sodium chloride 0.9 % infusion 40 mL  40 mL Intravenous PRN Latanya Paez MD           Past Medical History:  Past Medical History:   Diagnosis Date    Arthritis     Autonomic disease     CAD (coronary artery disease) 02/06/2017    Cervical radiculopathy 09/16/2021    Chronic constipation with acute exaccerbation 05/10/2021    Coronary artery disease     Degeneration of cervical intervertebral disc 08/11/2021    Diabetes mellitus     Diabetic foot ulcer 08/31/2020    Diabetic polyneuropathy associated with type 2 diabetes mellitus 01/18/2021    Elevated cholesterol     Gastroesophageal reflux disease 05/13/2019    Headache     HTN (hypertension), benign 05/03/2017    Hyperlipidemia     Hypertension     Mixed  hyperlipidemia 02/07/2017    Multiple lung nodules 01/26/2020    5mm, 9 mm RLL identified 1/2020, not present 10/2019.    Myocardial infarction     Osteomyelitis 01/22/2020    Osteomyelitis of fifth toe of right foot 10/07/2019    Pancreatitis     Persistent insomnia 01/20/2020    Renal disorder     Sleep apnea 02/06/2017    Sleep apnea with use of continuous positive airway pressure (CPAP)     NON-COMPLIANT    Slow transit constipation 01/16/2019    Spinal stenosis in cervical region 09/16/2021    Vitamin D deficiency 03/02/2021       Past Surgical History:  Past Surgical History:   Procedure Laterality Date    ABDOMINAL SURGERY      AMPUTATION FOOT / TOE Left 10/2021    5th digit     ANTERIOR CERVICAL DISCECTOMY W/ FUSION N/A 08/05/2022    Procedure: CERVICAL DISCECTOMY ANTERIOR WITH FUSION C5-6 with possible plating of C5-7 with neuromonitoring and 1 c-arm;  Surgeon: Karel Soliz MD;  Location: [facility] OR;  Service: Neurosurgery;  Laterality: N/A;    APPENDECTOMY      BACK SURGERY      CARDIAC CATHETERIZATION Left 02/08/2021    Procedure: Left Heart Cath w poss intervention left anatomical snuff box acess;  Surgeon: Omkar Charles DO;  Location: [facility] CATH INVASIVE LOCATION;  Service: Cardiology;  Laterality: Left;    CARDIAC SURGERY      CATARACT EXTRACTION      CERVICAL SPINE SURGERY      COLONOSCOPY N/A 01/31/2017    Normal exam repeat in 5 years    COLONOSCOPY N/A 02/11/2019    Mild acute inflammation    COLONOSCOPY N/A 04/07/2024    2 areas at 10 and 20 cm with friability ulceration 2 clips placed at 20 cm and 4 clips at 10 cm poor prep normal mucosa,mild eroisions and ulcerations in visible vessels    COLONOSCOPY N/A 7/1/2024    Procedure: COLONOSCOPY WITH ANESTHESIA;  Surgeon: Arsalan Lorenzo DO;  Location: [facility] ENDOSCOPY;  Service: Gastroenterology;  Laterality: N/A;  pre op constipation/diarrhea  post poor prep  pcp Del Shetty    COLONOSCOPY N/A 7/2/2024    Procedure: COLONOSCOPY  WITH ANESTHESIA;  Surgeon: Agapito Christopher MD;  Location: [facility] ENDOSCOPY;  Service: Gastroenterology;  Laterality: N/A;  pre rectal bleeding  post poor prep  pcp Del Shetty MD    COLONOSCOPY W/ POLYPECTOMY  2014    Hyperplastic polyp    CORONARY ARTERY BYPASS GRAFT  10/2015    ENDOSCOPY  2011    Gastritis with hemorrhage    ENDOSCOPY N/A 2017    Normal exam    ENDOSCOPY N/A 2019    Gastritis    ENDOSCOPY N/A 2020    Non-erosive gastritis with hemorrhage    ENDOSCOPY N/A 02/10/2021    Esophagitis    ENDOSCOPY N/A 2024    There were esophageal mucosal changes suspicious for short-segment Low's esophagus present in the distal esophagus. The maximum longitudinal extent of these mucosal changes was 2 cm in length. Mucosa was biopsied with a cold forceps for histologyDistal esophagus, biopsies: Mild chronic active esophagogastritis. No evidence of intestinal metaplasia, dysplasia. Antrum, bx, Mild chronic gastritis    FOOT SURGERY Left     INCISION AND DRAINAGE OF WOUND Left 2007    spider bite       Family History  Family History   Problem Relation Age of Onset    Colon cancer Father     Heart disease Father     Colon cancer Sister     Colon polyps Sister     Alzheimer's disease Mother     Coronary artery disease Sister     Coronary artery disease Sister        Social History  Social History     Socioeconomic History    Marital status:    Tobacco Use    Smoking status: Former     Current packs/day: 0.00     Types: Cigarettes     Quit date:      Years since quittin.0    Smokeless tobacco: Never    Tobacco comments:     smoked in highschool   Vaping Use    Vaping status: Never Used   Substance and Sexual Activity    Alcohol use: No    Drug use: No    Sexual activity: Defer         Review of Systems:  History obtained from chart review and the patient  General ROS: No fever or chills  Respiratory ROS: positive for - wheezing  Cardiovascular ROS: + chest  pain, no palpitations  Gastrointestinal ROS: No abdominal pain or melena  Genito-Urinary ROS: No dysuria or hematuria  Psych ROS: No anxiety and depression  14 point ROS reviewed with the patient and negative except as noted above and in the HPI unless unable to obtain.      Objective:  Patient Vitals for the past 24 hrs:   BP Temp Temp src Pulse Resp SpO2 Weight   01/11/25 1249 119/58 97.9 °F (36.6 °C) Oral 74 16 94 % --   01/11/25 0846 120/64 97.6 °F (36.4 °C) Oral 75 16 100 % --   01/11/25 0600 125/73 97.6 °F (36.4 °C) Oral 71 16 97 % 121 kg (267 lb)   01/10/25 2348 131/67 98.1 °F (36.7 °C) Oral 80 18 96 % --   01/10/25 1939 112/53 97.5 °F (36.4 °C) Oral 65 20 96 % --   01/10/25 1700 125/73 98 °F (36.7 °C) Oral 83 20 96 % --       Intake/Output Summary (Last 24 hours) at 1/11/2025 1523  Last data filed at 1/11/2025 0756  Gross per 24 hour   Intake 240 ml   Output 1600 ml   Net -1360 ml     General: awake/alert   Chest:  clear to auscultation bilaterally without respiratory distress  CVS:  IRRR  Abdominal: soft, nontender, positive bowel sounds  Extremities:  lower extremity edema  Skin: warm and dry without rash      Labs:  Results from last 7 days   Lab Units 01/11/25  0756 01/10/25  0151 01/09/25  0345   WBC 10*3/mm3 8.89 8.05 9.04   HEMOGLOBIN g/dL 10.0* 8.3* 9.2*   HEMATOCRIT % 32.7* 26.3* 29.9*   PLATELETS 10*3/mm3 419 335 380         Results from last 7 days   Lab Units 01/11/25  0756 01/10/25  0150 01/09/25  0505 01/09/25  0345   SODIUM mmol/L 138 139 137 137   POTASSIUM mmol/L 4.9 5.1 5.0 6.0*   CHLORIDE mmol/L 103 104 102 100   CO2 mmol/L 19.0* 23.0 19.0* 21.0*   BUN mg/dL 64* 66* 63* 62*   CREATININE mg/dL 2.92* 2.71* 2.68* 2.87*   CALCIUM mg/dL 9.2 8.8 8.9 9.1   EGFR mL/min/1.73 22.7* 24.8* 25.1* 23.1*   BILIRUBIN mg/dL 0.4 0.3  --  0.4   ALK PHOS U/L 160* 142*  --  169*   ALT (SGPT) U/L 14 15  --  21   AST (SGOT) U/L 14 12  --  21   GLUCOSE mg/dL 187* 248* 277* 282*       Radiology:   Imaging Results  (Last 72 Hours)       Procedure Component Value Units Date/Time    XR Abdomen KUB [[id number]] Collected: 01/10/25 1531     Updated: 01/10/25 1535    Narrative:      EXAMINATION:  XR ABDOMEN KUB-  1/10/2025 2:29 PM     HISTORY: Abdominal pain.     COMPARISON: No comparison study.     TECHNIQUE: Supine abdomen.     FINDINGS:   The bowel gas pattern is normal. No small bowel distention  is seen. There are cholecystectomy clips in the right upper quadrant.  There is no organomegaly or mass-effect. There are some degenerative  changes of the spine. There is enthesopathy of the pelvis..          Impression:      No acute findings.           This report was signed and finalized on 1/10/2025 3:32 PM by Dr. Raz Mendes MD.       CT Abdomen Pelvis Without Contrast [[id number]] Collected: 01/09/25 0624     Updated: 01/09/25 0835    Narrative:      EXAMINATION: CT ABDOMEN PELVIS WO CONTRAST-      1/9/2025 1:16 AM     HISTORY: severe abdominal pain; I48.91-Unspecified atrial fibrillation;  N18.4-Chronic kidney disease, stage 4 (severe); J81.0-Acute pulmonary  edema; J96.01-Acute respiratory failure with hypoxia; Z74.09-Other  reduced mobility     In order to have a CT radiation dose as low as reasonably achievable  Automated Exposure Control was utilized for adjustment of the mA and/or  KV according to patient size.     Total DLP = 626.95 mGy.cm     The CT scan of the abdomen and pelvis should performed without  intravenous contrast enhancement.     Images are acquired in axial plane and subsequent reconstruction in  coronal and sagittal planes.     The comparison is made with the previous study dated 10/20/2024.     The lung bases included in the study moderate bibasilar pleural  effusion. There areas of consolidation in the bilateral lung bases.  Presenting compression atelectasis due to adjacent pleural effusion.  There are atelectatic changes in the lower lungs. There is mild to  moderate pulmonary venous congestion.      Limited visualized cardiomediastinal structures show moderate  cardiomegaly. Atheromatous changes of the coronary arteries are seen.     Unenhanced liver and spleen appear unremarkable. An isodense mass or  lesion may not be evaluated or excluded.     The gallbladder is surgically absent.     There is fatty infiltration of the pancreas. No mass.     The adrenal glands are unremarkable.     There is moderate lobulation of the renal contour bilaterally. There is  perinephric fat infiltration. There is an exophytic low-density mass  from the lateral margin of the lower pole of the right kidney measuring  4.6 cm in diameter. CT density suggest a cyst. No radiopaque calculi. No  hydronephrosis. The ureters are normal. The urinary bladder is poorly  distended with moderate wall thickening. No intrinsic abnormality.     The prostate is not significantly enlarged.     There are small fat-containing inguinal hernias, right larger than the  left.     The stomach and duodenum are normal. Small bowel is nondilated. The  appendix is surgically absent. There is a large volume stool in the  colon. No finding to suggest obstruction.     Atheromatous changes of the abdominal aorta and iliac arteries. No  aneurysmal dilatation.     There is no evidence of abdominal or pelvic lymphadenopathy.     Images reviewed in bone window show no acute bony abnormality. Chronic  degenerative changes of the lumbar and limited visualized thoracic  spine. Old healed nonunited fractures of the right lower ribs are noted.       Impression:      1. No acute abnormality of the abdomen or pelvis to account for  patient's symptoms.  2. Large volume stool in the colon without evidence of obstruction may  suggest constipation.  3. The gallbladder and appendix are surgically absent.  4. Right renal cyst.  5. Moderate bibasilar pleural effusion and compression atelectasis of  the lower lobes bilaterally.     The above study was initially reviewed and  "reported by [facility]. I do not  find any discrepancies.                                                  This report was signed and finalized on 1/9/2025 8:32 AM by Dr. Carlos Cutler MD.               Culture:  No results found for: \"BLOODCX\", \"URINECX\", \"WOUNDCX\", \"MRSACX\", \"RESPCX\", \"STOOLCX\"      Assessment    Acute kidney injury  Baseline chronic kidney disease stage 3b  Type 2 diabetes with renal disease  Hypertension  Anemia of CKD  Obesity   Metabolic acidosis  Hyperkalemia  Sec HPTH    Plan:  Hold diuretics for rising serum creatinine  Monitor labs  Low K diet  oral calcitriol.             Vinny Hartley MD  1/11/2025  15:23 CST  "
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
## 2025-01-11 NOTE — PLAN OF CARE
Problem: Adult Inpatient Plan of Care  Goal: Plan of Care Review  Outcome: Progressing  Goal: Patient-Specific Goal (Individualized)  Outcome: Progressing  Goal: Absence of Hospital-Acquired Illness or Injury  Outcome: Progressing  Intervention: Identify and Manage Fall Risk  Recent Flowsheet Documentation  Taken 1/11/2025 0200 by Ezekiel Ramos RN  Safety Promotion/Fall Prevention:   assistive device/personal items within reach   fall prevention program maintained   safety round/check completed  Taken 1/11/2025 0000 by Ezekiel Ramos RN  Safety Promotion/Fall Prevention:   assistive device/personal items within reach   fall prevention program maintained   safety round/check completed  Taken 1/10/2025 2215 by Ezekiel Ramos RN  Safety Promotion/Fall Prevention:   assistive device/personal items within reach   fall prevention program maintained   safety round/check completed  Taken 1/10/2025 2118 by Ezekiel Ramos RN  Safety Promotion/Fall Prevention:   assistive device/personal items within reach   fall prevention program maintained   safety round/check completed  Taken 1/10/2025 2015 by Ezekiel Ramos RN  Safety Promotion/Fall Prevention:   assistive device/personal items within reach   fall prevention program maintained   safety round/check completed  Taken 1/10/2025 1915 by Ezekiel Ramos RN  Safety Promotion/Fall Prevention:   assistive device/personal items within reach   fall prevention program maintained   safety round/check completed  Intervention: Prevent Skin Injury  Recent Flowsheet Documentation  Taken 1/11/2025 0200 by Ezekiel Ramos RN  Body Position: position changed independently  Taken 1/11/2025 0000 by Ezekiel Ramos RN  Body Position: position changed independently  Taken 1/10/2025 2215 by Ezekiel Ramos RN  Body Position: position changed independently  Taken 1/10/2025 2118 by Ezekiel Ramos RN  Body Position: position changed independently  Skin Protection: incontinence pads utilized  Taken  1/10/2025 2015 by Ezekiel Ramos RN  Body Position: position changed independently  Taken 1/10/2025 1915 by Ezekiel Ramos RN  Body Position: position changed independently  Intervention: Prevent and Manage VTE (Venous Thromboembolism) Risk  Recent Flowsheet Documentation  Taken 1/10/2025 2118 by Ezekiel Ramos RN  VTE Prevention/Management: (eliquis) other (see comments)  Intervention: Prevent Infection  Recent Flowsheet Documentation  Taken 1/11/2025 0200 by Ezekiel Ramos RN  Infection Prevention:   hand hygiene promoted   rest/sleep promoted   single patient room provided  Taken 1/11/2025 0000 by Ezekiel Ramos RN  Infection Prevention:   hand hygiene promoted   rest/sleep promoted   single patient room provided  Taken 1/10/2025 2215 by Ezekiel Ramos RN  Infection Prevention:   hand hygiene promoted   rest/sleep promoted   single patient room provided  Taken 1/10/2025 2118 by Ezekiel Ramos RN  Infection Prevention:   hand hygiene promoted   rest/sleep promoted   single patient room provided  Taken 1/10/2025 2015 by Ezekiel Ramos RN  Infection Prevention:   hand hygiene promoted   rest/sleep promoted   single patient room provided  Taken 1/10/2025 1915 by Ezekiel Ramos RN  Infection Prevention:   hand hygiene promoted   rest/sleep promoted   single patient room provided  Goal: Optimal Comfort and Wellbeing  Outcome: Progressing  Intervention: Monitor Pain and Promote Comfort  Recent Flowsheet Documentation  Taken 1/10/2025 2146 by Ezekiel Ramos RN  Pain Management Interventions: pain medication given  Taken 1/10/2025 2118 by Ezekiel Ramos RN  Pain Management Interventions: medication offered but refused  Intervention: Provide Person-Centered Care  Recent Flowsheet Documentation  Taken 1/10/2025 2118 by Ezekiel Ramos RN  Trust Relationship/Rapport: care explained  Goal: Readiness for Transition of Care  Outcome: Progressing     Problem: Pain Acute  Goal: Optimal Pain Control and Function  Outcome:  Progressing  Intervention: Optimize Psychosocial Wellbeing  Recent Flowsheet Documentation  Taken 1/10/2025 2118 by Ezekiel Ramos RN  Supportive Measures: active listening utilized  Intervention: Develop Pain Management Plan  Recent Flowsheet Documentation  Taken 1/10/2025 2146 by Ezekiel Ramos RN  Pain Management Interventions: pain medication given  Taken 1/10/2025 2118 by Ezekiel Ramos RN  Pain Management Interventions: medication offered but refused  Intervention: Prevent or Manage Pain  Recent Flowsheet Documentation  Taken 1/10/2025 2118 by Ezekiel Ramos RN  Medication Review/Management: medications reviewed     Problem: Fall Injury Risk  Goal: Absence of Fall and Fall-Related Injury  Outcome: Progressing  Intervention: Identify and Manage Contributors  Recent Flowsheet Documentation  Taken 1/10/2025 2118 by Ezekiel Ramos RN  Medication Review/Management: medications reviewed  Intervention: Promote Injury-Free Environment  Recent Flowsheet Documentation  Taken 1/11/2025 0200 by Ezekiel Ramos RN  Safety Promotion/Fall Prevention:   assistive device/personal items within reach   fall prevention program maintained   safety round/check completed  Taken 1/11/2025 0000 by Ezekiel Ramos RN  Safety Promotion/Fall Prevention:   assistive device/personal items within reach   fall prevention program maintained   safety round/check completed  Taken 1/10/2025 2215 by Ezekiel Ramos RN  Safety Promotion/Fall Prevention:   assistive device/personal items within reach   fall prevention program maintained   safety round/check completed  Taken 1/10/2025 2118 by Ezekiel Ramos RN  Safety Promotion/Fall Prevention:   assistive device/personal items within reach   fall prevention program maintained   safety round/check completed  Taken 1/10/2025 2015 by Ezekiel Ramos, RN  Safety Promotion/Fall Prevention:   assistive device/personal items within reach   fall prevention program maintained   safety round/check  completed  Taken 1/10/2025 1915 by Ezekiel Ramos, RN  Safety Promotion/Fall Prevention:   assistive device/personal items within reach   fall prevention program maintained   safety round/check completed     Problem: Skin Injury Risk Increased  Goal: Skin Health and Integrity  Outcome: Progressing  Intervention: Optimize Skin Protection  Recent Flowsheet Documentation  Taken 1/11/2025 0200 by Ezekiel Ramos, RN  Activity Management:   up ad ghazal   up in chair  Taken 1/11/2025 0000 by Ezekiel aRmos, RN  Activity Management:   up ad ghazal   back to bed  Head of Bed (HOB) Positioning: HOB elevated  Taken 1/10/2025 2215 by Ezekiel Ramos, RN  Activity Management:   up ad ghazal   up in chair  Taken 1/10/2025 2118 by Ezekiel Ramos, ELVIS  Activity Management:   up ad ghazal   up in chair  Pressure Reduction Techniques: frequent weight shift encouraged  Pressure Reduction Devices: pressure-redistributing mattress utilized  Skin Protection: incontinence pads utilized  Taken 1/10/2025 2015 by Ezekiel Ramos, RN  Activity Management:   up ad ghazal   up in chair  Taken 1/10/2025 1915 by Ezekiel Ramos, RN  Activity Management:   up ad ghazal   up in chair     Goal Outcome Evaluation:  EMR reviewed and POC continued. Pt still complains of severe pain in abdomen, requesting morphine every 4 hours. Denies nausea or other complaints. Pt refuses wound care until this morning when he is ready. Tele controlled afib w BBB.

## 2025-01-12 LAB
ALBUMIN SERPL-MCNC: 3.5 G/DL (ref 3.5–5.2)
ALBUMIN/GLOB SERPL: 1 G/DL
ALP SERPL-CCNC: 162 U/L (ref 39–117)
ALT SERPL W P-5'-P-CCNC: 13 U/L (ref 1–41)
ANION GAP SERPL CALCULATED.3IONS-SCNC: 16 MMOL/L (ref 5–15)
AST SERPL-CCNC: 13 U/L (ref 1–40)
BILIRUB SERPL-MCNC: 0.3 MG/DL (ref 0–1.2)
BUN SERPL-MCNC: 58 MG/DL (ref 8–23)
BUN/CREAT SERPL: 20.9 (ref 7–25)
CALCIUM SPEC-SCNC: 9.2 MG/DL (ref 8.6–10.5)
CHLORIDE SERPL-SCNC: 102 MMOL/L (ref 98–107)
CO2 SERPL-SCNC: 23 MMOL/L (ref 22–29)
CREAT SERPL-MCNC: 2.77 MG/DL (ref 0.76–1.27)
DEPRECATED RDW RBC AUTO: 46.8 FL (ref 37–54)
EGFRCR SERPLBLD CKD-EPI 2021: 24.1 ML/MIN/1.73
ERYTHROCYTE [DISTWIDTH] IN BLOOD BY AUTOMATED COUNT: 14.1 % (ref 12.3–15.4)
GLOBULIN UR ELPH-MCNC: 3.6 GM/DL
GLUCOSE BLDC GLUCOMTR-MCNC: 187 MG/DL (ref 70–130)
GLUCOSE BLDC GLUCOMTR-MCNC: 212 MG/DL (ref 70–130)
GLUCOSE BLDC GLUCOMTR-MCNC: 213 MG/DL (ref 70–130)
GLUCOSE BLDC GLUCOMTR-MCNC: 220 MG/DL (ref 70–130)
GLUCOSE SERPL-MCNC: 170 MG/DL (ref 65–99)
HCT VFR BLD AUTO: 33 % (ref 37.5–51)
HGB BLD-MCNC: 10.3 G/DL (ref 13–17.7)
MCH RBC QN AUTO: 28.5 PG (ref 26.6–33)
MCHC RBC AUTO-ENTMCNC: 31.2 G/DL (ref 31.5–35.7)
MCV RBC AUTO: 91.2 FL (ref 79–97)
PLATELET # BLD AUTO: 464 10*3/MM3 (ref 140–450)
PMV BLD AUTO: 11.4 FL (ref 6–12)
POTASSIUM SERPL-SCNC: 4.7 MMOL/L (ref 3.5–5.2)
PROT SERPL-MCNC: 7.1 G/DL (ref 6–8.5)
RBC # BLD AUTO: 3.62 10*6/MM3 (ref 4.14–5.8)
SODIUM SERPL-SCNC: 141 MMOL/L (ref 136–145)
WBC NRBC COR # BLD AUTO: 8.94 10*3/MM3 (ref 3.4–10.8)

## 2025-01-12 PROCEDURE — 99232 SBSQ HOSP IP/OBS MODERATE 35: CPT | Performed by: INTERNAL MEDICINE

## 2025-01-12 PROCEDURE — 25010000002 MORPHINE PER 10 MG: Performed by: HOSPITALIST

## 2025-01-12 PROCEDURE — 82948 REAGENT STRIP/BLOOD GLUCOSE: CPT

## 2025-01-12 PROCEDURE — 80053 COMPREHEN METABOLIC PANEL: CPT | Performed by: NURSE PRACTITIONER

## 2025-01-12 PROCEDURE — 63710000001 INSULIN LISPRO (HUMAN) PER 5 UNITS: Performed by: HOSPITALIST

## 2025-01-12 PROCEDURE — 85027 COMPLETE CBC AUTOMATED: CPT | Performed by: NURSE PRACTITIONER

## 2025-01-12 PROCEDURE — 25010000002 MORPHINE PER 10 MG

## 2025-01-12 RX ORDER — MORPHINE SULFATE 2 MG/ML
1 INJECTION, SOLUTION INTRAMUSCULAR; INTRAVENOUS EVERY 4 HOURS PRN
Status: DISCONTINUED | OUTPATIENT
Start: 2025-01-12 | End: 2025-01-13

## 2025-01-12 RX ORDER — MORPHINE SULFATE 2 MG/ML
1 INJECTION, SOLUTION INTRAMUSCULAR; INTRAVENOUS ONCE AS NEEDED
Status: COMPLETED | OUTPATIENT
Start: 2025-01-12 | End: 2025-01-12

## 2025-01-12 RX ADMIN — ISOSORBIDE DINITRATE 10 MG: 10 TABLET ORAL at 12:38

## 2025-01-12 RX ADMIN — PANTOPRAZOLE SODIUM 40 MG: 40 TABLET, DELAYED RELEASE ORAL at 08:54

## 2025-01-12 RX ADMIN — CALCITRIOL CAPSULES 0.25 MCG 0.25 MCG: 0.25 CAPSULE ORAL at 08:54

## 2025-01-12 RX ADMIN — INSULIN LISPRO 2 UNITS: 100 INJECTION, SOLUTION INTRAVENOUS; SUBCUTANEOUS at 17:39

## 2025-01-12 RX ADMIN — MORPHINE SULFATE 1 MG: 2 INJECTION, SOLUTION INTRAMUSCULAR; INTRAVENOUS at 15:31

## 2025-01-12 RX ADMIN — DONEPEZIL HYDROCHLORIDE 10 MG: 10 TABLET, FILM COATED ORAL at 21:29

## 2025-01-12 RX ADMIN — Medication 10 ML: at 08:54

## 2025-01-12 RX ADMIN — OXYCODONE HYDROCHLORIDE 10 MG: 5 TABLET ORAL at 09:13

## 2025-01-12 RX ADMIN — HYDRALAZINE HYDROCHLORIDE 10 MG: 10 TABLET ORAL at 21:29

## 2025-01-12 RX ADMIN — INSULIN LISPRO 4 UNITS: 100 INJECTION, SOLUTION INTRAVENOUS; SUBCUTANEOUS at 12:38

## 2025-01-12 RX ADMIN — LACTULOSE 30 G: 20 SOLUTION ORAL at 08:54

## 2025-01-12 RX ADMIN — SERTRALINE HYDROCHLORIDE 25 MG: 25 TABLET ORAL at 08:54

## 2025-01-12 RX ADMIN — LACTULOSE 30 G: 20 SOLUTION ORAL at 15:35

## 2025-01-12 RX ADMIN — HYDRALAZINE HYDROCHLORIDE 10 MG: 10 TABLET ORAL at 15:31

## 2025-01-12 RX ADMIN — ISOSORBIDE DINITRATE 10 MG: 10 TABLET ORAL at 08:54

## 2025-01-12 RX ADMIN — ISOSORBIDE DINITRATE 10 MG: 10 TABLET ORAL at 17:39

## 2025-01-12 RX ADMIN — BUSPIRONE HYDROCHLORIDE 10 MG: 10 TABLET ORAL at 14:01

## 2025-01-12 RX ADMIN — MORPHINE SULFATE 1 MG: 2 INJECTION, SOLUTION INTRAMUSCULAR; INTRAVENOUS at 22:43

## 2025-01-12 RX ADMIN — PANTOPRAZOLE SODIUM 40 MG: 40 TABLET, DELAYED RELEASE ORAL at 21:29

## 2025-01-12 RX ADMIN — EMPAGLIFLOZIN 10 MG: 10 TABLET, FILM COATED ORAL at 08:54

## 2025-01-12 RX ADMIN — APIXABAN 5 MG: 5 TABLET, FILM COATED ORAL at 21:29

## 2025-01-12 RX ADMIN — INSULIN LISPRO 4 UNITS: 100 INJECTION, SOLUTION INTRAVENOUS; SUBCUTANEOUS at 21:35

## 2025-01-12 RX ADMIN — LACTULOSE 30 G: 20 SOLUTION ORAL at 21:29

## 2025-01-12 RX ADMIN — MORPHINE SULFATE 1 MG: 2 INJECTION, SOLUTION INTRAMUSCULAR; INTRAVENOUS at 01:48

## 2025-01-12 RX ADMIN — METOPROLOL SUCCINATE 50 MG: 50 TABLET, FILM COATED, EXTENDED RELEASE ORAL at 08:54

## 2025-01-12 RX ADMIN — APIXABAN 5 MG: 5 TABLET, FILM COATED ORAL at 08:54

## 2025-01-12 RX ADMIN — ROSUVASTATIN 10 MG: 10 TABLET, FILM COATED ORAL at 21:29

## 2025-01-12 RX ADMIN — HYDRALAZINE HYDROCHLORIDE 10 MG: 10 TABLET ORAL at 06:19

## 2025-01-12 RX ADMIN — INSULIN LISPRO 4 UNITS: 100 INJECTION, SOLUTION INTRAVENOUS; SUBCUTANEOUS at 08:54

## 2025-01-12 RX ADMIN — BUSPIRONE HYDROCHLORIDE 10 MG: 10 TABLET ORAL at 21:29

## 2025-01-12 RX ADMIN — Medication 10 ML: at 21:29

## 2025-01-12 NOTE — PLAN OF CARE
Problem: Constipation  Goal: Effective Bowel Elimination  Outcome: Not Progressing   Goal Outcome Evaluation:         Pt still complaining of abdominal pain, which is due to constipation. Pt was refusing all stool softners but after educations this shift on relevance of bowel regiman, pt has been complaint with taking it as scheduled. Pt also received miralax and stool softners. Pt however did not receive any narcotics this shift. Explained to patient that narcotics made the constipation worse. Pt verbalized understanding. Offered tylenol for pain, but patient refused. Pt up ad ghazal. Appetite poor. Bowel sounds active in all quadrants, but more hypoactive in LLQ.

## 2025-01-12 NOTE — PLAN OF CARE
Problem: Constipation  Goal: Effective Bowel Elimination  Outcome: Not Progressing     Problem: Adult Inpatient Plan of Care  Goal: Plan of Care Review  Outcome: Progressing  Goal: Patient-Specific Goal (Individualized)  Outcome: Progressing  Goal: Absence of Hospital-Acquired Illness or Injury  Outcome: Progressing  Intervention: Identify and Manage Fall Risk  Recent Flowsheet Documentation  Taken 1/12/2025 0000 by Ezekiel Ramos RN  Safety Promotion/Fall Prevention:   assistive device/personal items within reach   fall prevention program maintained   safety round/check completed  Taken 1/11/2025 2215 by Ezekiel Ramos RN  Safety Promotion/Fall Prevention:   assistive device/personal items within reach   fall prevention program maintained   safety round/check completed  Taken 1/11/2025 2110 by Ezekiel Ramos RN  Safety Promotion/Fall Prevention:   assistive device/personal items within reach   fall prevention program maintained   safety round/check completed  Taken 1/11/2025 2015 by Ezekiel Ramos RN  Safety Promotion/Fall Prevention:   assistive device/personal items within reach   fall prevention program maintained   safety round/check completed  Taken 1/11/2025 1915 by Ezekiel Ramos RN  Safety Promotion/Fall Prevention:   assistive device/personal items within reach   fall prevention program maintained   safety round/check completed  Intervention: Prevent Skin Injury  Recent Flowsheet Documentation  Taken 1/12/2025 0000 by Ezekiel Ramos RN  Body Position: position changed independently  Taken 1/11/2025 2215 by Ezekiel Ramos RN  Body Position:   position changed independently   supine  Taken 1/11/2025 2110 by Ezekiel Ramos RN  Body Position:   position changed independently   supine  Skin Protection: incontinence pads utilized  Taken 1/11/2025 2015 by Ezekiel Ramos RN  Body Position:   position changed independently   supine  Taken 1/11/2025 1915 by Ezekiel Ramos RN  Body Position:   position changed  independently   supine  Intervention: Prevent and Manage VTE (Venous Thromboembolism) Risk  Recent Flowsheet Documentation  Taken 1/11/2025 2110 by Ezekiel Ramos RN  VTE Prevention/Management: (eliquis) other (see comments)  Intervention: Prevent Infection  Recent Flowsheet Documentation  Taken 1/12/2025 0000 by Ezekiel Ramos RN  Infection Prevention:   hand hygiene promoted   rest/sleep promoted   single patient room provided  Taken 1/11/2025 2215 by Ezekiel Ramos RN  Infection Prevention:   hand hygiene promoted   rest/sleep promoted   single patient room provided  Taken 1/11/2025 2110 by Ezekiel Ramos RN  Infection Prevention:   hand hygiene promoted   rest/sleep promoted   single patient room provided  Taken 1/11/2025 2015 by Ezekiel Ramos RN  Infection Prevention:   hand hygiene promoted   rest/sleep promoted   single patient room provided  Taken 1/11/2025 1915 by Ezekiel Ramos RN  Infection Prevention:   hand hygiene promoted   rest/sleep promoted   single patient room provided  Goal: Optimal Comfort and Wellbeing  Outcome: Progressing  Intervention: Monitor Pain and Promote Comfort  Recent Flowsheet Documentation  Taken 1/11/2025 2110 by Ezekiel Ramos RN  Pain Management Interventions: medication offered but refused  Intervention: Provide Person-Centered Care  Recent Flowsheet Documentation  Taken 1/11/2025 2110 by Ezekiel Ramos RN  Trust Relationship/Rapport: care explained  Goal: Readiness for Transition of Care  Outcome: Progressing     Problem: Pain Acute  Goal: Optimal Pain Control and Function  Outcome: Progressing  Intervention: Optimize Psychosocial Wellbeing  Recent Flowsheet Documentation  Taken 1/11/2025 2110 by Ezekiel Ramos RN  Supportive Measures: active listening utilized  Intervention: Develop Pain Management Plan  Recent Flowsheet Documentation  Taken 1/11/2025 2110 by Ezekiel Ramos RN  Pain Management Interventions: medication offered but refused  Intervention: Prevent or Manage  Pain  Recent Flowsheet Documentation  Taken 1/11/2025 2110 by Ezekiel Ramos, RN  Medication Review/Management: medications reviewed     Problem: Fall Injury Risk  Goal: Absence of Fall and Fall-Related Injury  Outcome: Progressing  Intervention: Identify and Manage Contributors  Recent Flowsheet Documentation  Taken 1/11/2025 2110 by Ezekiel Ramos RN  Medication Review/Management: medications reviewed  Intervention: Promote Injury-Free Environment  Recent Flowsheet Documentation  Taken 1/12/2025 0000 by Ezekiel Ramos, RN  Safety Promotion/Fall Prevention:   assistive device/personal items within reach   fall prevention program maintained   safety round/check completed  Taken 1/11/2025 2215 by Ezekiel Ramos RN  Safety Promotion/Fall Prevention:   assistive device/personal items within reach   fall prevention program maintained   safety round/check completed  Taken 1/11/2025 2110 by Ezekiel Ramos RN  Safety Promotion/Fall Prevention:   assistive device/personal items within reach   fall prevention program maintained   safety round/check completed  Taken 1/11/2025 2015 by Ezekiel Ramos RN  Safety Promotion/Fall Prevention:   assistive device/personal items within reach   fall prevention program maintained   safety round/check completed  Taken 1/11/2025 1915 by Ezekiel Ramos RN  Safety Promotion/Fall Prevention:   assistive device/personal items within reach   fall prevention program maintained   safety round/check completed     Problem: Skin Injury Risk Increased  Goal: Skin Health and Integrity  Outcome: Progressing  Intervention: Optimize Skin Protection  Recent Flowsheet Documentation  Taken 1/12/2025 0000 by Ezekiel Ramos, RN  Activity Management:   up ad ghazal   up in chair  Taken 1/11/2025 2215 by Ezekiel Ramos, RN  Activity Management: up ad ghazal  Head of Bed (HOB) Positioning: HOB elevated  Taken 1/11/2025 2110 by Ezekiel Ramos, RN  Activity Management: up ad ghazal  Pressure Reduction Techniques: frequent  "weight shift encouraged  Head of Bed (HOB) Positioning: HOB elevated  Pressure Reduction Devices: pressure-redistributing mattress utilized  Skin Protection: incontinence pads utilized  Taken 1/11/2025 2015 by Ezekiel Ramos, RN  Activity Management: up ad ghazal  Head of Bed (HOB) Positioning: HOB elevated  Taken 1/11/2025 1915 by Ezekiel Ramos, RN  Activity Management: up ad ghazal  Head of Bed (HOB) Positioning: HOB elevated     Goal Outcome Evaluation:  EMR reviewed and POC continued. Pt had lactulose, miralax, senna, & bisacodyl tab for constipation yesterday. Stated he was able to have large BM tonight, although did not leave specimen. Pt complains of persistent chronic pain in neck/back. Pt educated multiple times that opiates will make constipation worse, but still requests pain meds. Oxy given last night at beginning of shift, but patient called again around 0045 asking for more pain meds. When asked if his abdomen was feeling better after having a BM pt stated \"yes.\" When asked where he was hurting he stated, \"my neck.\" This RN then explained to the patient that his home oxy regimen is being followed and that he has already had oxy at the beginning of the night, and no more available at this time. He then inquired about the morphine. Pt once again educated that opiates would worsen constipation, but patient stated, \"I have to have something for pain.\" When asked again where he was hurting pt stated, \"everywhere.\" Secure chat sent to overnight hospitalist, MD Etienne. Order to reassess pain. Upon reviewing chart, it was noted that the morphine was ordered to be stopped yesterday evening. Pt reassessed and stated \"if I can't get my morphine I may as well go home. I need my morphine.\" MD Etienne made aware & one-time order obtained. Med given & pt informed that this is the only dose available until attending team rounds in AM. Pt wife also called requesting update, and informed this RN that patient also takes lyrica " typically, and that the rehab he was at ([facility]?) was giving him additional pain meds. Encouraged wife to speak with team about this as well. Otherwise, controlled afib on tele and up ad ghazal.

## 2025-01-12 NOTE — PROGRESS NOTES
"RE:  Erick Luong  :  1955    CC: dyspnea         Subjective: Patient reports feeling well today and is hoping to go home soon.  He feels like his breathing is close to baseline.  Edema has resolved.  He denies any chest pain.    ROS: Pertinent positives and negatives listed above.  All other systems reviewed and negative.    Objective:   /81   Pulse 84   Temp 97.7 °F (36.5 °C) (Oral)   Resp 16   Ht 182.9 cm (72\")   Wt 118 kg (259 lb 3.2 oz)   SpO2 100%   BMI 35.15 kg/m²   Temp:  [97.4 °F (36.3 °C)-97.9 °F (36.6 °C)] 97.7 °F (36.5 °C)  Heart Rate:  [69-84] 84  Resp:  [16-18] 16  BP: (118-138)/(63-82) 125/81  No intake or output data in the 24 hours ending 25 1415    Current Facility-Administered Medications:     acetaminophen (TYLENOL) tablet 650 mg, 650 mg, Oral, Q4H PRN, 650 mg at 25 0308 **OR** acetaminophen (TYLENOL) 160 MG/5ML oral solution 650 mg, 650 mg, Oral, Q4H PRN **OR** acetaminophen (TYLENOL) suppository 650 mg, 650 mg, Rectal, Q4H PRN, Latanya Paez MD    apixaban (ELIQUIS) tablet 5 mg, 5 mg, Oral, BID, Latanya Paez MD, 5 mg at 25 0854    sennosides-docusate (PERICOLACE) 8.6-50 MG per tablet 2 tablet, 2 tablet, Oral, BID PRN, 2 tablet at 25 2110 **AND** polyethylene glycol (MIRALAX) packet 17 g, 17 g, Oral, Daily PRN, 17 g at 25 0853 **AND** bisacodyl (DULCOLAX) EC tablet 5 mg, 5 mg, Oral, Daily PRN, 5 mg at 25 1931 **AND** bisacodyl (DULCOLAX) suppository 10 mg, 10 mg, Rectal, Daily PRN, Latanya Paez MD, 10 mg at 25 0328    [Held by provider] bumetanide (BUMEX) injection 1 mg, 1 mg, Intravenous, Q12H, Latanya Paez MD, 1 mg at 25 0622    busPIRone (BUSPAR) tablet 10 mg, 10 mg, Oral, TID PRN, Latanya Paez MD, 10 mg at 25 1401    calcitriol (ROCALTROL) capsule 0.25 mcg, 0.25 mcg, Oral, Daily, Vinny Hartley MD, 0.25 mcg at 25 0854    Calcium Replacement - Follow Nurse / BPA Driven Protocol, , Not " Applicable, PRN, Latanya Paez MD    dextrose (D50W) (25 g/50 mL) IV injection 25 g, 25 g, Intravenous, Q15 Min PRN, Latanya Paez MD    dextrose (GLUTOSE) oral gel 15 g, 15 g, Oral, Q15 Min PRN, Latanya Paez MD    donepezil (ARICEPT) tablet 10 mg, 10 mg, Oral, Nightly, Latanya Paez MD, 10 mg at 25 2110    empagliflozin (JARDIANCE) tablet 10 mg, 10 mg, Oral, Daily, Pepe Barajas MD, 10 mg at 25 0854    glucagon (GLUCAGEN) injection 1 mg, 1 mg, Intramuscular, Q15 Min PRN, Latanya Paez MD    hydrALAZINE (APRESOLINE) tablet 10 mg, 10 mg, Oral, Q8H, Donna Roman APRN, 10 mg at 25 0619    Insulin Lispro (humaLOG) injection 2-9 Units, 2-9 Units, Subcutaneous, 4x Daily AC & at Bedtime, Latanya Paez MD, 4 Units at 25 1238    ipratropium-albuterol (DUO-NEB) nebulizer solution 3 mL, 3 mL, Nebulization, 4x Daily PRN, Latanya Paez MD    isosorbide dinitrate (ISORDIL) tablet 10 mg, 10 mg, Oral, TID - Nitrates, Donna Roman APRN, 10 mg at 25 1238    lactulose solution 30 g, 30 g, Oral, TID, Latanya Paez MD, 30 g at 25 0854    Magnesium Standard Dose Replacement - Follow Nurse / BPA Driven Protocol, , Not Applicable, PRN, Latanya Paez MD    metoprolol succinate XL (TOPROL-XL) 24 hr tablet 50 mg, 50 mg, Oral, Q24H, Ezekiel Roman APRN, 50 mg at 25 0854    Morphine sulfate (PF) injection 1 mg, 1 mg, Intravenous, Q4H PRN, Latanya Paez MD    [] Morphine sulfate (PF) injection 2 mg, 2 mg, Intravenous, Q4H PRN, 2 mg at 25 0633 **AND** naloxone (NARCAN) injection 0.4 mg, 0.4 mg, Intravenous, Q5 Min PRN, Latanya Paez MD    nitroglycerin (NITROSTAT) SL tablet 0.4 mg, 0.4 mg, Sublingual, Q5 Min PRN, Latanya Paez MD, 0.4 mg at 25 3198    ondansetron (ZOFRAN) injection 4 mg, 4 mg, Intravenous, Q6H PRN, Latanya Paez MD, 4 mg at 01/10/25 1703    oxyCODONE (ROXICODONE) immediate release tablet 10 mg, 10 mg, Oral, Q12H PRN, Jeannine  MD Latanya, 10 mg at 01/12/25 0913    pantoprazole (PROTONIX) EC tablet 40 mg, 40 mg, Oral, BID, Latanya Paez MD, 40 mg at 01/12/25 0854    Phosphorus Replacement - Follow Nurse / BPA Driven Protocol, , Not Applicable, Jeannine CUADRA Fakhri, MD    Potassium Replacement - Follow Nurse / BPA Driven Protocol, , Not Applicable, Jeannine CUADRA Fakhri, MD    rosuvastatin (CRESTOR) tablet 10 mg, 10 mg, Oral, Nightly, Michelle Valle APRN, 10 mg at 01/11/25 2110    sertraline (ZOLOFT) tablet 25 mg, 25 mg, Oral, Daily, Deborah Real APRN, 25 mg at 01/12/25 0854    sodium chloride 0.9 % flush 10 mL, 10 mL, Intravenous, Q12H, Latanya Paez MD, 10 mL at 01/12/25 0854    sodium chloride 0.9 % flush 10 mL, 10 mL, Intravenous, PRN, Latanya Paez MD    sodium chloride 0.9 % infusion 40 mL, 40 mL, Intravenous, PRN, Latanya Paez MD    Physical Exam:   Gen: Alert and oriented x3, no acute distress  Heart: Irregularly irregular, no murmurs, rubs, or gallops  Chest: Lungs clear to auscultation bilaterally, normal respiratory effort  Abd: Soft, obese, non tender, bowel sounds are positive  Ext: No edema     Lab Results (last 24 hours)       Procedure Component Value Units Date/Time    POC Glucose Once [[id number]]  (Abnormal) Collected: 01/12/25 1152    Specimen: Blood Updated: 01/12/25 1203     Glucose 213 mg/dL      Comment: : [employer]eter ID: [id number]       POC Glucose Once [[id number]]  (Abnormal) Collected: 01/12/25 0825    Specimen: Blood Updated: 01/12/25 0837     Glucose 220 mg/dL      Comment: : [employer] ID: [id number]       Comprehensive Metabolic Panel [[id number]]  (Abnormal) Collected: 01/12/25 0457    Specimen: Blood Updated: 01/12/25 0536     Glucose 170 mg/dL      BUN 58 mg/dL      Creatinine 2.77 mg/dL      Sodium 141 mmol/L      Potassium 4.7 mmol/L      Chloride 102 mmol/L      CO2 23.0 mmol/L      Calcium 9.2 mg/dL      Total Protein 7.1 g/dL       Albumin 3.5 g/dL      ALT (SGPT) 13 U/L      AST (SGOT) 13 U/L      Alkaline Phosphatase 162 U/L      Total Bilirubin 0.3 mg/dL      Globulin 3.6 gm/dL      A/G Ratio 1.0 g/dL      BUN/Creatinine Ratio 20.9     Anion Gap 16.0 mmol/L      eGFR 24.1 mL/min/1.73     Narrative:      GFR Categories in Chronic Kidney Disease (CKD)      GFR Category          GFR (mL/min/1.73)    Interpretation  G1                     90 or greater         Normal or high (1)  G2                      60-89                Mild decrease (1)  G3a                   45-59                Mild to moderate decrease  G3b                   30-44                Moderate to severe decrease  G4                    15-29                Severe decrease  G5                    14 or less           Kidney failure          (1)In the absence of evidence of kidney disease, neither GFR category G1 or G2 fulfill the criteria for CKD.    eGFR calculation 2021 CKD-EPI creatinine equation, which does not include race as a factor    CBC (No Diff) [[id number]]  (Abnormal) Collected: 01/12/25 0457    Specimen: Blood Updated: 01/12/25 0515     WBC 8.94 10*3/mm3      RBC 3.62 10*6/mm3      Hemoglobin 10.3 g/dL      Hematocrit 33.0 %      MCV 91.2 fL      MCH 28.5 pg      MCHC 31.2 g/dL      RDW 14.1 %      RDW-SD 46.8 fl      MPV 11.4 fL      Platelets 464 10*3/mm3     POC Glucose Once [[id number]]  (Abnormal) Collected: 01/11/25 2124    Specimen: Blood Updated: 01/11/25 2135     Glucose 229 mg/dL      Comment: : luis alfredo Ramirez ID: [id number]       Blood Culture - Blood, Arm, Right [[id number]]  (Normal) Collected: 01/06/25 1700    Specimen: Blood from Arm, Right Updated: 01/11/25 1715     Blood Culture No growth at 5 days    Blood Culture - Blood, Arm, Left [[id number]]  (Normal) Collected: 01/06/25 1648    Specimen: Blood from Arm, Left Updated: 01/11/25 1715     Blood Culture No growth at 5 days    POC Glucose Once [[id number]]  (Normal) Collected:  01/11/25 1629    Specimen: Blood Updated: 01/11/25 1656     Glucose 97 mg/dL      Comment: : [id number] [employer] HeatherMeter ID: [id number]             Imaging Results (Last 24 Hours)       ** No results found for the last 24 hours. **              Assessment:   1. Acute systolic CHF: Evidence of volume overload on chest x-ray from 1/6/2025.  EF 36-40% on echo which is decreased from 61-65% on prior echo on 4/2/2024.  Volume status is improved and he currently appears euvolemic.  2.  Permanent atrial fibrillation  3.  Coronary artery disease: History of CABG  4.  Acute on chronic renal insufficiency: Creatinine improving today.  5.  Essential hypertension: Stable  6.  Hyperlipidemia      Plan:   -Patient appears stable from a cardiac standpoint.  -No further inpatient cardiac testing or treatment recommended.  -Recommend restarting oral Bumex at 2 mg daily on discharge.  -Continue Jardiance, hydralazine, Isordil, and metoprolol succinate.  -Continue anticoagulation with Eliquis  -Follow-up with regular cardiologist Dr. Garay or SUSAN Correa after discharge.  -Cardiology will sign off.  -Thank you for allowing us to participate in the care of your patient.  Please do not hesitate to contact us if we can be of any further assistance.

## 2025-01-12 NOTE — PROGRESS NOTES
Nephrology ([facility]) progress Note      Patient:  Erick Luong  YOB: 1955  Date of Service: 1/12/2025  MRN: [id number]   Acct: [id number]   Primary Care Physician: Del Shetty MD  Advance Directive:   Code Status and Medical Interventions: CPR (Attempt to Resuscitate); Full Support   Ordered at: 01/06/25 1800     Code Status (Patient has no pulse and is not breathing):    CPR (Attempt to Resuscitate)     Medical Interventions (Patient has pulse or is breathing):    Full Support     Admit Date: 1/6/2025       Hospital Day: 4  Referring Provider: Latanya Paez MD      Patient personally seen and examined.  Complete chart including Consults, Notes, Operative Reports, Labs, Cardiology, and Radiology studies reviewed as able.        Subjective:  Erick Luong is a 69 y.o. male for whom we were consulted for evaluation and treatment of acute kidney injury. Baseline chronic kidney disease stage 3b, baseline creatinine approximately 1.8.  Follows with Dr Lomas in our office. History of poorly controlled dialysis, hypertension, coronary artery disease, chronic diastolic CHF, BRITTNI, diabetic foot ulcer, obesity. On 1/06 was sent to ER from PCP office with rapid heart rate and dyspnea. In ER labs revealed creatinine 3.0. Noted to be in atrial fibrillation with RVR. Received IV Bumex and was admitted to medical floor. Currently is awake and alert. No dyspnea or pain at rest. Gets short of breath with any activity. Denies n/v/d. Urine output nonoliguric.   Today, he offered no new complaints. Has good urine output, has no significant leg edema. Off diuretics now.     Allergies:  Cefepime, Bactrim [sulfamethoxazole-trimethoprim], Vancomycin, Zolpidem, and Metronidazole    Home Meds:  Medications Prior to Admission   Medication Sig Dispense Refill Last Dose/Taking    apixaban (Eliquis) 5 MG tablet tablet Take 1 tablet by mouth 2 (Two) Times a Day. 60 tablet 0 Taking    ascorbic acid  (VITAMIN C) 1000 MG tablet Take 1 tablet by mouth Daily. 30 tablet 3 Taking    busPIRone (BUSPAR) 10 MG tablet Take 1 tablet by mouth 3 (Three) Times a Day As Needed (anxiety).   1/6/2025    donepezil (ARICEPT) 10 MG tablet Take 1 tablet by mouth every night at bedtime. 30 tablet 0 Taking    Insulin Glargine (Lantus SoloStar) 100 UNIT/ML injection pen Inject 20 units nightly as directed (Patient taking differently: Inject 20 Units under the skin into the appropriate area as directed Every Night.   Patient stated that he is still taking 35 units at bedtime) 15 mL 3 Taking Differently    oxyCODONE (ROXICODONE) 10 MG tablet Take 1 tablet by mouth Every 12 (Twelve) Hours As Needed for Moderate Pain.   Taking As Needed    pantoprazole (PROTONIX) 40 MG EC tablet Take 1 tablet by mouth 2 (Two) Times a Day. 90 tablet 4 Taking    potassium chloride ER (K-TAB) 20 MEQ tablet controlled-release ER tablet Take 1 tablet by mouth Daily. 30 tablet 0 Taking    rosuvastatin (CRESTOR) 10 MG tablet Take 1 tablet by mouth every night at bedtime. 30 tablet 2 Taking    sennosides-docusate (PERICOLACE) 8.6-50 MG per tablet Take 1 tablet by mouth Every Night. Obtain OTC   Taking    sertraline (ZOLOFT) 25 MG tablet Take 1 tablet by mouth Daily.   Taking    sodium hypochlorite (DAKIN'S 1/4 STRENGTH) 0.125 % solution topical solution 0.125% Apply  topically to the appropriate area as directed 2 (Two) Times a Day. 473 mL 5 Taking    tamsulosin (FLOMAX) 0.4 MG capsule 24 hr capsule Take 1 capsule by mouth Daily. 90 capsule 4 Taking    bumetanide (BUMEX) 2 MG tablet Take 1 tablet by mouth 2 (Two) Times a Day.       docusate sodium 100 MG capsule Take 1 capsule by mouth Daily.       famotidine (PEPCID) 20 MG tablet Take 1 tablet by mouth 2 (Two) Times a Day. 60 tablet 0 Unknown    Insulin Lispro (humaLOG) 100 UNIT/ML injection Inject 2-12 Units under the skin into the appropriate area as directed 4 (Four) Times a Day Before Meals & at Bedtime.  Inject subcutaneously four times a day per sliding scale:  0-150 = 0 units; 151-200 = 2u; 201-250 = 4u; 251-300 = 6u; 301-350 = 8u; 351-400 = 10u; 401+ = 12u       ipratropium-albuterol (DUO-NEB) 0.5-2.5 mg/3 ml nebulizer Take 3 mL by nebulization 4 (Four) Times a Day As Needed. (Patient taking differently: Take 3 mL by nebulization 4 (Four) Times a Day As Needed for Wheezing or Shortness of Air.) 360 mL 3     lisinopril (PRINIVIL,ZESTRIL) 5 MG tablet Take 1 tablet by mouth Daily. 30 tablet 0 Unknown    melatonin 3 MG tablet Take 2 tablets by mouth At Night As Needed for Sleep. 30 tablet 0     melatonin 3 MG tablet Take 1 tablet by mouth Daily. 30 tablet 0 Unknown    nitroglycerin (NITROSTAT) 0.4 MG SL tablet Place 1 tablet as needed under the tongue every 5 minutes. If no improvement after 3 tablets go to ER (Patient taking differently: Place 1 tablet under the tongue As Needed for Chest Pain.) 25 tablet 0     polyethylene glycol (MIRALAX) 17 GM/SCOOP powder Take 17 g by mouth Daily As Needed (constipation).   Unknown    pregabalin (LYRICA) 100 MG capsule Take 1 capsule by mouth 3 (Three) Times a Day.          Medicines:  Current Facility-Administered Medications   Medication Dose Route Frequency Provider Last Rate Last Admin    acetaminophen (TYLENOL) tablet 650 mg  650 mg Oral Q4H PRN Latanya Paez MD   650 mg at 01/09/25 0308    Or    acetaminophen (TYLENOL) 160 MG/5ML oral solution 650 mg  650 mg Oral Q4H PRN Latanya Paez MD        Or    acetaminophen (TYLENOL) suppository 650 mg  650 mg Rectal Q4H PRN Latanya Paez MD        apixaban (ELIQUIS) tablet 5 mg  5 mg Oral BID Latanya Paez MD   5 mg at 01/12/25 0854    sennosides-docusate (PERICOLACE) 8.6-50 MG per tablet 2 tablet  2 tablet Oral BID PRN Latanya Paez MD   2 tablet at 01/11/25 2110    And    polyethylene glycol (MIRALAX) packet 17 g  17 g Oral Daily PRN Latanya Paez MD   17 g at 01/11/25 0853    And    bisacodyl (DULCOLAX) EC tablet 5  mg  5 mg Oral Daily PRN Latanya Paez MD   5 mg at 01/11/25 1931    And    bisacodyl (DULCOLAX) suppository 10 mg  10 mg Rectal Daily PRN Latanya Paez MD   10 mg at 01/09/25 0328    busPIRone (BUSPAR) tablet 10 mg  10 mg Oral TID PRN Latanya Paez MD   10 mg at 01/12/25 1401    calcitriol (ROCALTROL) capsule 0.25 mcg  0.25 mcg Oral Daily Vinny Hartley MD   0.25 mcg at 01/12/25 0854    Calcium Replacement - Follow Nurse / BPA Driven Protocol   Not Applicable PRN Latanya Paez MD        dextrose (D50W) (25 g/50 mL) IV injection 25 g  25 g Intravenous Q15 Min PRN Latanya Paez MD        dextrose (GLUTOSE) oral gel 15 g  15 g Oral Q15 Min PRN Latanya Paez MD        donepezil (ARICEPT) tablet 10 mg  10 mg Oral Nightly Latanya Paez MD   10 mg at 01/11/25 2110    empagliflozin (JARDIANCE) tablet 10 mg  10 mg Oral Daily Pepe Barajas MD   10 mg at 01/12/25 0854    glucagon (GLUCAGEN) injection 1 mg  1 mg Intramuscular Q15 Min PRN Latanya Paez MD        hydrALAZINE (APRESOLINE) tablet 10 mg  10 mg Oral Q8H Donna Roman APRN   10 mg at 01/12/25 1531    Insulin Lispro (humaLOG) injection 2-9 Units  2-9 Units Subcutaneous 4x Daily AC & at Bedtime Latanya Paez MD   4 Units at 01/12/25 1238    ipratropium-albuterol (DUO-NEB) nebulizer solution 3 mL  3 mL Nebulization 4x Daily PRN Latanya Paez MD        isosorbide dinitrate (ISORDIL) tablet 10 mg  10 mg Oral TID - Nitrates Donna Roman APRN   10 mg at 01/12/25 1238    lactulose solution 30 g  30 g Oral TID Latanya Paez MD   30 g at 01/12/25 1535    Magnesium Standard Dose Replacement - Follow Nurse / BPA Driven Protocol   Not Applicable PRN Latanya Paez MD        metoprolol succinate XL (TOPROL-XL) 24 hr tablet 50 mg  50 mg Oral Q24H Ezekiel Roman APRN   50 mg at 01/12/25 0854    Morphine sulfate (PF) injection 1 mg  1 mg Intravenous Q4H PRN Latanya Paez MD   1 mg at 01/12/25 1531    naloxone (NARCAN) injection 0.4 mg   0.4 mg Intravenous Q5 Min PRN Latanya Paez MD        nitroglycerin (NITROSTAT) SL tablet 0.4 mg  0.4 mg Sublingual Q5 Min PRN Latanya Paez MD   0.4 mg at 01/07/25 2353    ondansetron (ZOFRAN) injection 4 mg  4 mg Intravenous Q6H PRN Latanya Paez MD   4 mg at 01/10/25 1703    oxyCODONE (ROXICODONE) immediate release tablet 10 mg  10 mg Oral Q12H PRN Latanya Paez MD   10 mg at 01/12/25 0913    pantoprazole (PROTONIX) EC tablet 40 mg  40 mg Oral BID Latanya Paez MD   40 mg at 01/12/25 0854    Phosphorus Replacement - Follow Nurse / BPA Driven Protocol   Not Applicable PRLatanya Cid MD        Potassium Replacement - Follow Nurse / BPA Driven Protocol   Not Applicable PRLatanya Cid MD        rosuvastatin (CRESTOR) tablet 10 mg  10 mg Oral Nightly Michelle Valle APRN   10 mg at 01/11/25 2110    sertraline (ZOLOFT) tablet 25 mg  25 mg Oral Daily Deborah Real, APRN   25 mg at 01/12/25 0854    sodium chloride 0.9 % flush 10 mL  10 mL Intravenous Q12H Latanya Paez MD   10 mL at 01/12/25 0854    sodium chloride 0.9 % flush 10 mL  10 mL Intravenous PRN Latanya Paez MD        sodium chloride 0.9 % infusion 40 mL  40 mL Intravenous PRN Latanya Paez MD           Past Medical History:  Past Medical History:   Diagnosis Date    Arthritis     Autonomic disease     CAD (coronary artery disease) 02/06/2017    Cervical radiculopathy 09/16/2021    Chronic constipation with acute exaccerbation 05/10/2021    Coronary artery disease     Degeneration of cervical intervertebral disc 08/11/2021    Diabetes mellitus     Diabetic foot ulcer 08/31/2020    Diabetic polyneuropathy associated with type 2 diabetes mellitus 01/18/2021    Elevated cholesterol     Gastroesophageal reflux disease 05/13/2019    Headache     HTN (hypertension), benign 05/03/2017    Hyperlipidemia     Hypertension     Mixed hyperlipidemia 02/07/2017    Multiple lung nodules 01/26/2020    5mm, 9 mm RLL identified 1/2020, not  present 10/2019.    Myocardial infarction     Osteomyelitis 01/22/2020    Osteomyelitis of fifth toe of right foot 10/07/2019    Pancreatitis     Persistent insomnia 01/20/2020    Renal disorder     Sleep apnea 02/06/2017    Sleep apnea with use of continuous positive airway pressure (CPAP)     NON-COMPLIANT    Slow transit constipation 01/16/2019    Spinal stenosis in cervical region 09/16/2021    Vitamin D deficiency 03/02/2021       Past Surgical History:  Past Surgical History:   Procedure Laterality Date    ABDOMINAL SURGERY      AMPUTATION FOOT / TOE Left 10/2021    5th digit     ANTERIOR CERVICAL DISCECTOMY W/ FUSION N/A 08/05/2022    Procedure: CERVICAL DISCECTOMY ANTERIOR WITH FUSION C5-6 with possible plating of C5-7 with neuromonitoring and 1 c-arm;  Surgeon: Karel Soliz MD;  Location: [facility] OR;  Service: Neurosurgery;  Laterality: N/A;    APPENDECTOMY      BACK SURGERY      CARDIAC CATHETERIZATION Left 02/08/2021    Procedure: Left Heart Cath w poss intervention left anatomical snuff box acess;  Surgeon: Omkar Charles DO;  Location:  [facility];  Service: Cardiology;  Laterality: Left;    CARDIAC SURGERY      CATARACT EXTRACTION      CERVICAL SPINE SURGERY      COLONOSCOPY N/A 01/31/2017    Normal exam repeat in 5 years    COLONOSCOPY N/A 02/11/2019    Mild acute inflammation    COLONOSCOPY N/A 04/07/2024    2 areas at 10 and 20 cm with friability ulceration 2 clips placed at 20 cm and 4 clips at 10 cm poor prep normal mucosa,mild eroisions and ulcerations in visible vessels    COLONOSCOPY N/A 7/1/2024    Procedure: COLONOSCOPY WITH ANESTHESIA;  Surgeon: Arsalan Lorenzo DO;  Location:  [facility];  Service: Gastroenterology;  Laterality: N/A;  pre op constipation/diarrhea  post poor prep  pcp Del Shetty    COLONOSCOPY N/A 7/2/2024    Procedure: COLONOSCOPY WITH ANESTHESIA;  Surgeon: Agapito Christopher MD;  Location:  [facility];  Service:  Gastroenterology;  Laterality: N/A;  pre rectal bleeding  post poor prep  pcp Del Shetty MD    COLONOSCOPY W/ POLYPECTOMY  2014    Hyperplastic polyp    CORONARY ARTERY BYPASS GRAFT  10/2015    ENDOSCOPY  2011    Gastritis with hemorrhage    ENDOSCOPY N/A 2017    Normal exam    ENDOSCOPY N/A 2019    Gastritis    ENDOSCOPY N/A 2020    Non-erosive gastritis with hemorrhage    ENDOSCOPY N/A 02/10/2021    Esophagitis    ENDOSCOPY N/A 2024    There were esophageal mucosal changes suspicious for short-segment Low's esophagus present in the distal esophagus. The maximum longitudinal extent of these mucosal changes was 2 cm in length. Mucosa was biopsied with a cold forceps for histologyDistal esophagus, biopsies: Mild chronic active esophagogastritis. No evidence of intestinal metaplasia, dysplasia. Antrum, bx, Mild chronic gastritis    FOOT SURGERY Left     INCISION AND DRAINAGE OF WOUND Left 2007    spider bite       Family History  Family History   Problem Relation Age of Onset    Colon cancer Father     Heart disease Father     Colon cancer Sister     Colon polyps Sister     Alzheimer's disease Mother     Coronary artery disease Sister     Coronary artery disease Sister        Social History  Social History     Socioeconomic History    Marital status:    Tobacco Use    Smoking status: Former     Current packs/day: 0.00     Types: Cigarettes     Quit date:      Years since quittin.0    Smokeless tobacco: Never    Tobacco comments:     smoked in highschool   Vaping Use    Vaping status: Never Used   Substance and Sexual Activity    Alcohol use: No    Drug use: No    Sexual activity: Defer         Review of Systems:  History obtained from chart review and the patient  General ROS: No fever or chills  Respiratory ROS: positive for - wheezing  Cardiovascular ROS: + chest pain, no palpitations  Gastrointestinal ROS: No abdominal pain or melena  Genito-Urinary  ROS: No dysuria or hematuria  Psych ROS: No anxiety and depression  14 point ROS reviewed with the patient and negative except as noted above and in the HPI unless unable to obtain.      Objective:  Patient Vitals for the past 24 hrs:   BP Temp Temp src Pulse Resp SpO2 Weight   01/12/25 1528 112/71 97.7 °F (36.5 °C) Oral 81 16 97 % --   01/12/25 1153 125/81 97.7 °F (36.5 °C) Oral 84 16 100 % --   01/12/25 0827 134/66 97.5 °F (36.4 °C) Oral 69 16 95 % --   01/12/25 0506 138/82 97.4 °F (36.3 °C) Oral 80 18 98 % 118 kg (259 lb 3.2 oz)   01/11/25 2019 129/63 97.8 °F (36.6 °C) Oral 77 16 97 % --   01/11/25 1711 118/72 97.9 °F (36.6 °C) Oral 73 16 98 % --     No intake or output data in the 24 hours ending 01/12/25 1537    General: awake/alert   Chest:  clear to auscultation bilaterally without respiratory distress  CVS:  IRRR  Abdominal: soft, nontender, positive bowel sounds  Extremities:  lower extremity edema  Skin: warm and dry without rash      Labs:  Results from last 7 days   Lab Units 01/12/25 0457 01/11/25  0756 01/10/25  0151   WBC 10*3/mm3 8.94 8.89 8.05   HEMOGLOBIN g/dL 10.3* 10.0* 8.3*   HEMATOCRIT % 33.0* 32.7* 26.3*   PLATELETS 10*3/mm3 464* 419 335         Results from last 7 days   Lab Units 01/12/25  0457 01/11/25  0756 01/10/25  0150   SODIUM mmol/L 141 138 139   POTASSIUM mmol/L 4.7 4.9 5.1   CHLORIDE mmol/L 102 103 104   CO2 mmol/L 23.0 19.0* 23.0   BUN mg/dL 58* 64* 66*   CREATININE mg/dL 2.77* 2.92* 2.71*   CALCIUM mg/dL 9.2 9.2 8.8   EGFR mL/min/1.73 24.1* 22.7* 24.8*   BILIRUBIN mg/dL 0.3 0.4 0.3   ALK PHOS U/L 162* 160* 142*   ALT (SGPT) U/L 13 14 15   AST (SGOT) U/L 13 14 12   GLUCOSE mg/dL 170* 187* 248*       Radiology:   Imaging Results (Last 72 Hours)       Procedure Component Value Units Date/Time    XR Abdomen KUB [[id number]] Collected: 01/10/25 1531     Updated: 01/10/25 1535    Narrative:      EXAMINATION:  XR ABDOMEN KUB-  1/10/2025 2:29 PM     HISTORY: Abdominal pain.    "  COMPARISON: No comparison study.     TECHNIQUE: Supine abdomen.     FINDINGS:   The bowel gas pattern is normal. No small bowel distention  is seen. There are cholecystectomy clips in the right upper quadrant.  There is no organomegaly or mass-effect. There are some degenerative  changes of the spine. There is enthesopathy of the pelvis..          Impression:      No acute findings.           This report was signed and finalized on 1/10/2025 3:32 PM by Dr. Raz Mendes MD.               Culture:  No results found for: \"BLOODCX\", \"URINECX\", \"WOUNDCX\", \"MRSACX\", \"RESPCX\", \"STOOLCX\"      Assessment    Acute kidney injury  Baseline chronic kidney disease stage 3b  Type 2 diabetes with renal disease  Hypertension  Anemia of CKD  Obesity   Metabolic acidosis  Hyperkalemia  Sec HPTH    Plan:  Restart diuretics on discharge  Monitor labs  Low K diet  oral calcitriol.             Vinny Hartley MD  1/12/2025  15:37 CST  "
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
## 2025-01-13 ENCOUNTER — READMISSION MANAGEMENT (OUTPATIENT)
Dept: CALL CENTER | Facility: HOSPITAL | Age: 70
End: 2025-01-13
Payer: MEDICARE

## 2025-01-13 VITALS
WEIGHT: 258.4 LBS | OXYGEN SATURATION: 96 % | TEMPERATURE: 97.6 F | HEART RATE: 70 BPM | BODY MASS INDEX: 35 KG/M2 | RESPIRATION RATE: 18 BRPM | SYSTOLIC BLOOD PRESSURE: 158 MMHG | DIASTOLIC BLOOD PRESSURE: 79 MMHG | HEIGHT: 72 IN

## 2025-01-13 LAB
ALBUMIN SERPL-MCNC: 3.3 G/DL (ref 3.5–5.2)
ALBUMIN/GLOB SERPL: 1 G/DL
ALP SERPL-CCNC: 152 U/L (ref 39–117)
ALT SERPL W P-5'-P-CCNC: 11 U/L (ref 1–41)
ANION GAP SERPL CALCULATED.3IONS-SCNC: 16 MMOL/L (ref 5–15)
AST SERPL-CCNC: 12 U/L (ref 1–40)
BASOPHILS # BLD AUTO: 0.08 10*3/MM3 (ref 0–0.2)
BASOPHILS NFR BLD AUTO: 1.1 % (ref 0–1.5)
BILIRUB SERPL-MCNC: 0.3 MG/DL (ref 0–1.2)
BUN SERPL-MCNC: 50 MG/DL (ref 8–23)
BUN/CREAT SERPL: 19.1 (ref 7–25)
CALCIUM SPEC-SCNC: 9.1 MG/DL (ref 8.6–10.5)
CHLORIDE SERPL-SCNC: 104 MMOL/L (ref 98–107)
CO2 SERPL-SCNC: 23 MMOL/L (ref 22–29)
CREAT SERPL-MCNC: 2.62 MG/DL (ref 0.76–1.27)
DEPRECATED RDW RBC AUTO: 45.9 FL (ref 37–54)
EGFRCR SERPLBLD CKD-EPI 2021: 25.8 ML/MIN/1.73
EOSINOPHIL # BLD AUTO: 0.84 10*3/MM3 (ref 0–0.4)
EOSINOPHIL NFR BLD AUTO: 11.1 % (ref 0.3–6.2)
ERYTHROCYTE [DISTWIDTH] IN BLOOD BY AUTOMATED COUNT: 14.3 % (ref 12.3–15.4)
GLOBULIN UR ELPH-MCNC: 3.3 GM/DL
GLUCOSE BLDC GLUCOMTR-MCNC: 150 MG/DL (ref 70–130)
GLUCOSE BLDC GLUCOMTR-MCNC: 231 MG/DL (ref 70–130)
GLUCOSE SERPL-MCNC: 133 MG/DL (ref 65–99)
HCT VFR BLD AUTO: 31.2 % (ref 37.5–51)
HGB BLD-MCNC: 9.9 G/DL (ref 13–17.7)
IMM GRANULOCYTES # BLD AUTO: 0.24 10*3/MM3 (ref 0–0.05)
IMM GRANULOCYTES NFR BLD AUTO: 3.2 % (ref 0–0.5)
LYMPHOCYTES # BLD AUTO: 1.12 10*3/MM3 (ref 0.7–3.1)
LYMPHOCYTES NFR BLD AUTO: 14.8 % (ref 19.6–45.3)
MCH RBC QN AUTO: 28.6 PG (ref 26.6–33)
MCHC RBC AUTO-ENTMCNC: 31.7 G/DL (ref 31.5–35.7)
MCV RBC AUTO: 90.2 FL (ref 79–97)
MONOCYTES # BLD AUTO: 0.81 10*3/MM3 (ref 0.1–0.9)
MONOCYTES NFR BLD AUTO: 10.7 % (ref 5–12)
NEUTROPHILS NFR BLD AUTO: 4.5 10*3/MM3 (ref 1.7–7)
NEUTROPHILS NFR BLD AUTO: 59.1 % (ref 42.7–76)
NRBC BLD AUTO-RTO: 0 /100 WBC (ref 0–0.2)
PLATELET # BLD AUTO: 424 10*3/MM3 (ref 140–450)
PMV BLD AUTO: 11.2 FL (ref 6–12)
POTASSIUM SERPL-SCNC: 4.2 MMOL/L (ref 3.5–5.2)
PROT SERPL-MCNC: 6.6 G/DL (ref 6–8.5)
RBC # BLD AUTO: 3.46 10*6/MM3 (ref 4.14–5.8)
SODIUM SERPL-SCNC: 143 MMOL/L (ref 136–145)
WBC NRBC COR # BLD AUTO: 7.59 10*3/MM3 (ref 3.4–10.8)

## 2025-01-13 PROCEDURE — 80053 COMPREHEN METABOLIC PANEL: CPT | Performed by: HOSPITALIST

## 2025-01-13 PROCEDURE — 82948 REAGENT STRIP/BLOOD GLUCOSE: CPT

## 2025-01-13 PROCEDURE — 85025 COMPLETE CBC W/AUTO DIFF WBC: CPT | Performed by: HOSPITALIST

## 2025-01-13 RX ORDER — NITROGLYCERIN 0.4 MG/1
0.4 TABLET SUBLINGUAL
Status: DISCONTINUED | OUTPATIENT
Start: 2025-01-13 | End: 2025-01-13 | Stop reason: HOSPADM

## 2025-01-13 RX ORDER — HYDRALAZINE HYDROCHLORIDE 10 MG/1
10 TABLET, FILM COATED ORAL EVERY 8 HOURS SCHEDULED
Qty: 90 TABLET | Refills: 0 | Status: SHIPPED | OUTPATIENT
Start: 2025-01-13 | End: 2025-02-12

## 2025-01-13 RX ORDER — METOPROLOL SUCCINATE 50 MG/1
50 TABLET, EXTENDED RELEASE ORAL
Qty: 30 TABLET | Refills: 0 | Status: SHIPPED | OUTPATIENT
Start: 2025-01-14 | End: 2025-02-13

## 2025-01-13 RX ORDER — LACTULOSE 10 G/15ML
30 SOLUTION ORAL 3 TIMES DAILY
Qty: 450 ML | Refills: 0 | Status: SHIPPED | OUTPATIENT
Start: 2025-01-13

## 2025-01-13 RX ORDER — ISOSORBIDE DINITRATE 10 MG/1
10 TABLET ORAL
Qty: 90 TABLET | Refills: 0 | Status: SHIPPED | OUTPATIENT
Start: 2025-01-13 | End: 2025-02-12

## 2025-01-13 RX ORDER — BUMETANIDE 2 MG/1
2 TABLET ORAL DAILY
Qty: 30 TABLET | Refills: 0 | Status: SHIPPED | OUTPATIENT
Start: 2025-01-13 | End: 2025-02-12

## 2025-01-13 RX ORDER — CALCITRIOL 0.25 UG/1
0.25 CAPSULE, LIQUID FILLED ORAL DAILY
Qty: 30 CAPSULE | Refills: 0 | Status: SHIPPED | OUTPATIENT
Start: 2025-01-14 | End: 2025-02-13

## 2025-01-13 RX ORDER — PREGABALIN 100 MG/1
100 CAPSULE ORAL 3 TIMES DAILY
Status: DISCONTINUED | OUTPATIENT
Start: 2025-01-13 | End: 2025-01-13 | Stop reason: HOSPADM

## 2025-01-13 RX ADMIN — APIXABAN 5 MG: 5 TABLET, FILM COATED ORAL at 09:21

## 2025-01-13 RX ADMIN — SERTRALINE HYDROCHLORIDE 25 MG: 25 TABLET ORAL at 09:21

## 2025-01-13 RX ADMIN — ACETAMINOPHEN 650 MG: 325 TABLET ORAL at 10:41

## 2025-01-13 RX ADMIN — PREGABALIN 100 MG: 100 CAPSULE ORAL at 10:41

## 2025-01-13 RX ADMIN — HYDRALAZINE HYDROCHLORIDE 10 MG: 10 TABLET ORAL at 05:36

## 2025-01-13 RX ADMIN — LACTULOSE 30 G: 20 SOLUTION ORAL at 09:21

## 2025-01-13 RX ADMIN — CALCITRIOL CAPSULES 0.25 MCG 0.25 MCG: 0.25 CAPSULE ORAL at 09:21

## 2025-01-13 RX ADMIN — METOPROLOL SUCCINATE 50 MG: 50 TABLET, FILM COATED, EXTENDED RELEASE ORAL at 09:21

## 2025-01-13 RX ADMIN — PANTOPRAZOLE SODIUM 40 MG: 40 TABLET, DELAYED RELEASE ORAL at 09:21

## 2025-01-13 RX ADMIN — Medication 10 ML: at 09:36

## 2025-01-13 RX ADMIN — EMPAGLIFLOZIN 10 MG: 10 TABLET, FILM COATED ORAL at 09:21

## 2025-01-13 RX ADMIN — ISOSORBIDE DINITRATE 10 MG: 10 TABLET ORAL at 09:21
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
## 2025-01-13 NOTE — PLAN OF CARE
Problem: Adult Inpatient Plan of Care  Goal: Absence of Hospital-Acquired Illness or Injury  Outcome: Progressing  Intervention: Identify and Manage Fall Risk  Recent Flowsheet Documentation  Taken 1/12/2025 1800 by Shahrzad Burr RN  Safety Promotion/Fall Prevention:   safety round/check completed   assistive device/personal items within reach   clutter free environment maintained   lighting adjusted   nonskid shoes/slippers when out of bed  Taken 1/12/2025 1600 by Shahrzad Burr RN  Safety Promotion/Fall Prevention:   safety round/check completed   assistive device/personal items within reach   clutter free environment maintained   lighting adjusted   nonskid shoes/slippers when out of bed  Taken 1/12/2025 1400 by Shahrzad Burr RN  Safety Promotion/Fall Prevention:   safety round/check completed   assistive device/personal items within reach   clutter free environment maintained   lighting adjusted   nonskid shoes/slippers when out of bed  Taken 1/12/2025 1200 by Shahrzad Burr RN  Safety Promotion/Fall Prevention:   safety round/check completed   assistive device/personal items within reach   clutter free environment maintained   lighting adjusted   nonskid shoes/slippers when out of bed  Taken 1/12/2025 1000 by Shahrzad Burr RN  Safety Promotion/Fall Prevention:   safety round/check completed   assistive device/personal items within reach   clutter free environment maintained   lighting adjusted   nonskid shoes/slippers when out of bed  Taken 1/12/2025 0800 by Shahrzad Burr RN  Safety Promotion/Fall Prevention:   assistive device/personal items within reach   clutter free environment maintained   nonskid shoes/slippers when out of bed   safety round/check completed  Intervention: Prevent and Manage VTE (Venous Thromboembolism) Risk  Recent Flowsheet Documentation  Taken 1/12/2025 0800 by Shahrzad Burr RN  VTE Prevention/Management: ((see MAR)) other (see comments)  Intervention: Prevent  Infection  Recent Flowsheet Documentation  Taken 1/12/2025 0800 by Shahrzad Burr RN  Infection Prevention: hand hygiene promoted  Goal: Readiness for Transition of Care  Outcome: Progressing   Goal Outcome Evaluation:      Pt remained AAOx4 this shift. Affect is very flat. Pt was showered and sheets changed this shift. Wound care done. Morphine IV started back prn. Pt had bowel movement this shift. BGS monitored and treated per sliding scale insulin (see MAR). Pt received prn pain medication as needed (see MAR).  Pt up to bathroom with stand by assistance. From chair to bed independently. Appetite poor.

## 2025-01-13 NOTE — THERAPY DISCHARGE NOTE
Acute Care - Physical Therapy Discharge Summary  [facility]       Patient Name: Erick Luong  : 1955  MRN: [id number]    Today's Date: 2025                 Admit Date: 2025      PT Recommendation and Plan    Visit Dx:    ICD-10-CM ICD-9-CM   1. Atrial fibrillation with rapid ventricular response  I48.91 427.31   2. Chronic renal failure (CRF), stage 4 (severe)  N18.4 585.4   3. Acute pulmonary edema  J81.0 518.4   4. Acute respiratory failure with hypoxia  J96.01 518.81   5. Impaired mobility  Z74.09 799.89   6. Acute respiratory failure, unspecified whether with hypoxia or hypercapnia  J96.00 518.81   7. CHF (congestive heart failure), NYHA class I, acute on chronic, combined  I50.43 428.43     428.0        Outcome Measures       Row Name 25 1500             How much help from another person do you currently need...    Turning from your back to your side while in flat bed without using bedrails? 4  -DEIDRE      Moving from lying on back to sitting on the side of a flat bed without bedrails? 4  -DEIDRE      Moving to and from a bed to a chair (including a wheelchair)? 4  -DEIDRE      Standing up from a chair using your arms (e.g., wheelchair, bedside chair)? 4  -DEIDRE      Climbing 3-5 steps with a railing? 4  -DEIDRE      To walk in hospital room? 4  -DEIDRE      AM-PAC 6 Clicks Score (PT) 24  -DEIDRE                User Key  (r) = Recorded By, (t) = Taken By, (c) = Cosigned By      Initials Name Provider Type    Thao Jimenez PTA Physical Therapist Assistant                         PT Rehab Goals       Row Name 25 1400             Bed Mobility Goal 1 (PT)    Activity/Assistive Device (Bed Mobility Goal 1, PT) rolling to left;rolling to right;sit to supine;supine to sit  -AB      [place] Level/Cues Needed (Bed Mobility Goal 1, PT) independent  -AB      Time Frame (Bed Mobility Goal 1, PT) long term goal (LTG);10 days  -AB      Progress/Outcomes (Bed Mobility Goal 1, PT) goal not met  -AB          Transfer Goal 1 (PT)    Activity/Assistive Device (Transfer Goal 1, PT) sit-to-stand/stand-to-sit;bed-to-chair/chair-to-bed;toilet;tub  -AB      Hawkins Level/Cues Needed (Transfer Goal 1, PT) independent  -AB      Time Frame (Transfer Goal 1, PT) long term goal (LTG);10 days  -AB      Progress/Outcome (Transfer Goal 1, PT) goal not met  -AB         Gait Training Goal 1 (PT)    Activity/Assistive Device (Gait Training Goal 1, PT) gait (walking locomotion);decrease asymmetrical patterns;decrease fall risk;diminish gait deviation;forward stepping;improve balance and speed;increase endurance/gait distance;increase energy conservation;assistive device use;walker, rolling  -AB      Hawkins Level (Gait Training Goal 1, PT) standby assist  -AB      Distance (Gait Training Goal 1, PT) 180' without complaints  -AB      Time Frame (Gait Training Goal 1, PT) long term goal (LTG);10 days  -AB      Progress/Outcome (Gait Training Goal 1, PT) goal met  -AB         Balance Goal 1 (PT)    Activity/Assistive Device (Balance Goal) sitting static balance;sitting dynamic balance;unsupported;with ADLs;with functional reaching activities;other (see comments)  to don cam boot with Indep  -AB      Hawkins Level/Cues Needed (Balance Goal 1, PT) independent  -AB      Time Frame (Balance Goal 1, PT) long-term goal (LTG);by discharge  -AB      Progress/Outcomes (Balance Goal 1, PT) goal partially met  -AB                User Key  (r) = Recorded By, (t) = Taken By, (c) = Cosigned By      Initials Name Provider Type Discipline    Donna Pang PTA Physical Therapist Assistant PT                        PT Discharge Summary  Anticipated Discharge Disposition (PT): home with assist  Reason for Discharge: Discharge from facility  Outcomes Achieved: Refer to plan of care for updates on goals achieved  Discharge Destination: Home with assist      Donna Rodriguez PTA   1/13/2025

## 2025-01-13 NOTE — DISCHARGE SUMMARY
[facility] Medicine Services  DISCHARGE SUMMARY       Date of Admission: 1/6/2025  Date of Discharge:  1/13/2025  Primary Care Physician: Del Shetty MD    Presenting Problem/History of Present Illness:  SOB    Final Discharge Diagnoses:  From what I can gather through consultants discharge diagnoses, it includes the following:   Acute kidney injury  Baseline chronic kidney disease stage 3b  Type 2 diabetes with renal disease  Hypertension  Anemia of CKD  Obesity   Metabolic acidosis  Hyperkalemia  Sec HPTH  Acute systolic heart failure with evidence of volume overload  Permanent atrial fibrillation chronic anticoagulation-reviewed with pharmacist [place] dosing of Eliquis still appropriate for his profile  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 Left ventricular ejection fraction appears to be 36 - 40%.    Left ventricular wall thickness is consistent with mild concentric hypertrophy.    Normal right ventricular cavity size noted. Mildly reduced right ventricular systolic function noted.    No significant valvular abnormalities identified on this study.      Imaging Results (All)       Procedure Component Value Units Date/Time    XR Abdomen KUB [[id number]] Collected: 01/10/25 1531     Updated: 01/10/25 1535    Narrative:      EXAMINATION:  XR ABDOMEN KUB-  1/10/2025 2:29 PM     HISTORY: Abdominal pain.     COMPARISON: No comparison study.     TECHNIQUE: Supine abdomen.     FINDINGS:   The bowel gas pattern is normal. No small bowel distention  is seen. There are cholecystectomy clips in the right upper  quadrant.  There is no organomegaly or mass-effect. There are some degenerative  changes of the spine. There is enthesopathy of the pelvis..          Impression:      No acute findings.           This report was signed and finalized on 1/10/2025 3:32 PM by Dr. Raz Mendes MD.       CT Abdomen Pelvis Without Contrast [[id number]] Collected: 01/09/25 0624     Updated: 01/09/25 0835    Narrative:      EXAMINATION: CT ABDOMEN PELVIS WO CONTRAST-      1/9/2025 1:16 AM     HISTORY: severe abdominal pain; I48.91-Unspecified atrial fibrillation;  N18.4-Chronic kidney disease, stage 4 (severe); J81.0-Acute pulmonary  edema; J96.01-Acute respiratory failure with hypoxia; Z74.09-Other  reduced mobility     In order to have a CT radiation dose as low as reasonably achievable  Automated Exposure Control was utilized for adjustment of the mA and/or  KV according to patient size.     Total DLP = 626.95 mGy.cm     The CT scan of the abdomen and pelvis should performed without  intravenous contrast enhancement.     Images are acquired in axial plane and subsequent reconstruction in  coronal and sagittal planes.     The comparison is made with the previous study dated 10/20/2024.     The lung bases included in the study moderate bibasilar pleural  effusion. There areas of consolidation in the bilateral lung bases.  Presenting compression atelectasis due to adjacent pleural effusion.  There are atelectatic changes in the lower lungs. There is mild to  moderate pulmonary venous congestion.     Limited visualized cardiomediastinal structures show moderate  cardiomegaly. Atheromatous changes of the coronary arteries are seen.     Unenhanced liver and spleen appear unremarkable. An isodense mass or  lesion may not be evaluated or excluded.     The gallbladder is surgically absent.     There is fatty infiltration of the pancreas. No mass.     The adrenal glands are unremarkable.     There is moderate lobulation of the renal contour  bilaterally. There is  perinephric fat infiltration. There is an exophytic low-density mass  from the lateral margin of the lower pole of the right kidney measuring  4.6 cm in diameter. CT density suggest a cyst. No radiopaque calculi. No  hydronephrosis. The ureters are normal. The urinary bladder is poorly  distended with moderate wall thickening. No intrinsic abnormality.     The prostate is not significantly enlarged.     There are small fat-containing inguinal hernias, right larger than the  left.     The stomach and duodenum are normal. Small bowel is nondilated. The  appendix is surgically absent. There is a large volume stool in the  colon. No finding to suggest obstruction.     Atheromatous changes of the abdominal aorta and iliac arteries. No  aneurysmal dilatation.     There is no evidence of abdominal or pelvic lymphadenopathy.     Images reviewed in bone window show no acute bony abnormality. Chronic  degenerative changes of the lumbar and limited visualized thoracic  spine. Old healed nonunited fractures of the right lower ribs are noted.       Impression:      1. No acute abnormality of the abdomen or pelvis to account for  patient's symptoms.  2. Large volume stool in the colon without evidence of obstruction may  suggest constipation.  3. The gallbladder and appendix are surgically absent.  4. Right renal cyst.  5. Moderate bibasilar pleural effusion and compression atelectasis of  the lower lobes bilaterally.     The above study was initially reviewed and reported by [employer]. I do not  find any discrepancies.                                                  This report was signed and finalized on 1/9/2025 8:32 AM by Dr. Carlos Cutler MD.       XR Chest 1 View [[id number]] Collected: 01/06/25 1605     Updated: 01/06/25 1609    Narrative:      EXAM: XR CHEST 1 VW- 1/6/2025 3:02 PM     HISTORY: Shortness of breath       COMPARISON: 12/8/2024.     TECHNIQUE: Single frontal radiograph of the chest  was obtained.     FINDINGS:     Support Devices: Prior CABG.     Cardiac and Mediastinal Silhouettes: Stable cardiomegaly.     Lungs/Pleura: Prominent perihilar interstitial opacities with vascular  indistinctness. Possible trace left effusion. No visible pneumothorax.     Osseous structures: No acute osseous finding.     Other: None.       Impression:         Pulmonary edema pattern with possible trace left effusion.           This report was signed and finalized on 1/6/2025 4:06 PM by Ronal Wisdom.             LAB RESULTS:      Lab 01/13/25  0408 01/12/25  0457 01/11/25  0756 01/10/25  0151 01/09/25  0345 01/08/25  0014 01/07/25  0430 01/06/25  1848 01/06/25  1648   WBC 7.59 8.94 8.89 8.05 9.04 6.70 7.14   < > 7.66   HEMOGLOBIN 9.9* 10.3* 10.0* 8.3* 9.2* 8.3* 8.4*   < > 8.1*   HEMATOCRIT 31.2* 33.0* 32.7* 26.3* 29.9* 26.4* 26.7*   < > 25.3*   PLATELETS 424 464* 419 335 380 276 250   < > 229   NEUTROS ABS 4.50  --   --  4.99 5.83 4.12 4.39   < > 5.88   IMMATURE GRANS (ABS) 0.24*  --   --  0.11* 0.10* 0.05 0.04   < > 0.04   LYMPHS ABS 1.12  --   --  1.05 1.18 0.85 0.95   < > 0.58*   MONOS ABS 0.81  --   --  0.95* 0.95* 0.77 0.82   < > 0.68   EOS ABS 0.84*  --   --  0.89* 0.89* 0.85* 0.86*   < > 0.45*   MCV 90.2 91.2 94.5 90.1 91.4 90.1 90.5   < > 90.4   LACTATE  --   --   --   --   --   --   --   --  1.0   PROTIME  --   --   --   --   --   --   --   --  15.6*   APTT  --   --   --   --   --   --   --   --  36.0    < > = values in this interval not displayed.         Lab 01/13/25  0408 01/12/25  0457 01/11/25  0756 01/10/25  0150 01/09/25  0505 01/09/25  0345 01/06/25  1848 01/06/25  1648   SODIUM 143 141 138 139 137 137   < > 140   POTASSIUM 4.2 4.7 4.9 5.1 5.0 6.0*   < > 5.2   CHLORIDE 104 102 103 104 102 100   < > 105   CO2 23.0 23.0 19.0* 23.0 19.0* 21.0*   < > 20.0*   ANION GAP 16.0* 16.0* 16.0* 12.0 16.0* 16.0*   < > 15.0   BUN 50* 58* 64* 66* 63* 62*   < > 66*   CREATININE 2.62* 2.77* 2.92* 2.71* 2.68* 2.87*    < > 3.03*   EGFR 25.8* 24.1* 22.7* 24.8* 25.1* 23.1*   < > 21.7*   GLUCOSE 133* 170* 187* 248* 277* 282*   < > 289*   CALCIUM 9.1 9.2 9.2 8.8 8.9 9.1   < > 8.7   MAGNESIUM  --   --  2.3  --   --  2.1  --  2.4    < > = values in this interval not displayed.         Lab 01/13/25  0408 01/12/25  0457 01/11/25  0756 01/10/25  0150 01/09/25  0345   TOTAL PROTEIN 6.6 7.1 7.0 6.2 7.4   ALBUMIN 3.3* 3.5 3.0* 3.1* 3.4*   GLOBULIN 3.3 3.6 4.0 3.1 4.0   ALT (SGPT) 11 13 14 15 21   AST (SGOT) 12 13 14 12 21   BILIRUBIN 0.3 0.3 0.4 0.3 0.4   ALK PHOS 152* 162* 160* 142* 169*   LIPASE  --   --   --   --  12*         Lab 01/09/25  0505 01/09/25  0345 01/08/25  0219 01/08/25  0014 01/06/25  1848 01/06/25  1648   PROBNP  --   --   --   --   --  5,180.0*   HSTROP T 66* 64* 63* 62* 57* 57*   PROTIME  --   --   --   --   --  15.6*   INR  --   --   --   --   --  1.18*             Lab 01/08/25  0014   IRON 24*   IRON SATURATION (TSAT) 11*   TIBC 222*   TRANSFERRIN 149*         Lab 01/06/25  1626   PH, ARTERIAL 7.388   PCO2, ARTERIAL 34.3*   PO2 ART 67.2*   O2 SATURATION ART 93.9*   HCO3 ART 20.6   BASE EXCESS ART -3.9*   CARBOXYHEMOGLOBIN 1.3     Brief Urine Lab Results  (Last result in the past 365 days)        Color   Clarity   Blood   Leuk Est   Nitrite   Protein   CREAT   Urine HCG        11/23/24 1235 Yellow   Clear   Negative   Negative   Negative   30 mg/dL (1+)                 Microbiology Results (last 10 days)       Procedure Component Value - Date/Time    Blood Culture - Blood, Arm, Right [[id number]]  (Normal) Collected: 01/06/25 1700    Lab Status: Final result Specimen: Blood from Arm, Right Updated: 01/11/25 1715     Blood Culture No growth at 5 days    Blood Culture - Blood, Arm, Left [[id number]]  (Normal) Collected: 01/06/25 1648    Lab Status: Final result Specimen: Blood from Arm, Left Updated: 01/11/25 1715     Blood Culture No growth at 5 days            Hospital Course:   Patient is a 69-year-old man who has  "significant past medical histories including diabetes with neuropathy, cervical radiculopathy, hypertension, gastroesophageal reflux disease, slow transit constipation, atrial fibrillation on chronic anticoagulation hyperlipidemia, all other things.  He presented on January 6 with shortness of breath.  Patient reportedly had PCP on the day of admission and was found with rapid heart rate.  In the emergency room the following were reportedly found, \"CXR CHF, BNP elevated, was found to be in afib with rvr, given cardizem, bb, bumix, will admit, is os daily weights, continue iv bumex, eliquis, started cardizem CD, consult cardiology, renal function is worth than baseline, consult nephrology, d/c ACEI, SSI, identify reconcile restart home meds once reconciled\".    Patient was seen in consult by:     Cardiology  Assessment:   1. Acute systolic CHF: Evidence of volume overload on chest x-ray from 1/6/2025.  EF 36-40% on echo which is decreased from 61-65% on prior echo on 4/2/2024.  Volume status is improved and he currently appears euvolemic.  2.  Permanent atrial fibrillation  3.  Coronary artery disease: History of CABG  4.  Acute on chronic renal insufficiency: Creatinine improving today.  5.  Essential hypertension: Stable  6.  Hyperlipidemia        Plan:   -Patient appears stable from a cardiac standpoint.  -No further inpatient cardiac testing or treatment recommended.  -Recommend restarting oral Bumex at 2 mg daily on discharge.  -Continue Jardiance, hydralazine, Isordil, and metoprolol succinate.  -Continue anticoagulation with Eliquis  -Follow-up with regular cardiologist Dr. Garay or SUSAN Correa after discharge.  -Cardiology will sign off.  -Thank you for allowing us to participate in the care of your patient.  Please do not hesitate to contact us if we can be of any further assistance.      Nephrology   Acute kidney injury  Baseline chronic kidney disease stage 3b  Type 2 diabetes with renal " "disease  Hypertension  Anemia of CKD  Obesity   Metabolic acidosis  Hyperkalemia  [facility]     Plan:  Restart diuretics on discharge  Monitor labs  Low K diet  oral calcitriol.       Patient overall is hemodynamically stable although appears chronically ill.  Spoke to his wife Joan at bedside.  We discussed his home medication.  I informed them that he has lots of medications that are new.  I encouraged him to check his blood sugar particularly in relation to renal disease that can increase effect of insulin.  Discussed precautionary measures regarding hypoglycemia  Noted that patient is on multiple bowel regimen.  I confirmed this from spouse  I verified his advance directive: Full code  I strongly encourage compliance with close follow-up with primary care provider.      Physical Exam on Discharge:  /79 (BP Location: Left arm, Patient Position: Lying)   Pulse 70   Temp 97.6 °F (36.4 °C) (Oral)   Resp 18   Ht 182.9 cm (72\")   Wt 117 kg (258 lb 6.4 oz)   SpO2 96%   BMI 35.05 kg/m²   Physical Exam  Patient has no level of expectations on himself  Appears chronically ill  Not in any distress  Adequately maintaining oxygenation in the mid 90s  Hemodynamically stable  In atrial fibrillation rate controlled at 83  Irregularly irregular heart rate  I did not appreciate any murmur  Obese abdomen, nontender, difficult to examine for any organomegaly  No cyanosis  Positive edema  Warm dry skin  Good capillary refill  Condition on Discharge: Stable    Discharge Disposition:  Home or Self Care    Discharge Medications:     Discharge Medications        New Medications        Instructions Start Date   calcitriol 0.25 MCG capsule  Commonly known as: ROCALTROL   0.25 mcg, Oral, Daily   Start Date: January 14, 2025     empagliflozin 10 MG tablet tablet  Commonly known as: JARDIANCE   10 mg, Oral, Daily   Start Date: January 14, 2025     hydrALAZINE 10 MG tablet  Commonly known as: APRESOLINE   10 mg, Oral, Every 8 " Hours Scheduled      isosorbide dinitrate 10 MG tablet  Commonly known as: ISORDIL   10 mg, Oral, 3 Times Daily (Nitrates)      lactulose 20 GM/30ML solution solution   30 g, Oral, 3 Times Daily      metoprolol succinate XL 50 MG 24 hr tablet  Commonly known as: TOPROL-XL   50 mg, Oral, Every 24 Hours Scheduled   Start Date: January 14, 2025            Changes to Medications        Instructions Start Date   bumetanide 2 MG tablet  Commonly known as: BUMEX  What changed: when to take this   2 mg, Oral, Daily      ipratropium-albuterol 0.5-2.5 mg/3 ml nebulizer  Commonly known as: DUO-NEB   3 mL, Nebulization, 4 Times Daily PRN      Lantus SoloStar 100 UNIT/ML injection pen  Generic drug: Insulin Glargine  What changed: additional instructions   Inject 20 units nightly as directed      melatonin 3 MG tablet  What changed: Another medication with the same name was removed. Continue taking this medication, and follow the directions you see here.   6 mg, Oral, Nightly PRN      nitroglycerin 0.4 MG SL tablet  Commonly known as: NITROSTAT   Place 1 tablet as needed under the tongue every 5 minutes. If no improvement after 3 tablets go to ER             Continue These Medications        Instructions Start Date   ascorbic acid 1000 MG tablet  Commonly known as: VITAMIN C   1,000 mg, Oral, Daily      busPIRone 10 MG tablet  Commonly known as: BUSPAR   10 mg, Oral, 3 Times Daily PRN      docusate sodium 100 MG capsule   Take 1 capsule by mouth Daily.      donepezil 10 MG tablet  Commonly known as: ARICEPT   10 mg, Oral, Every Night at Bedtime      Eliquis 5 MG tablet tablet  Generic drug: apixaban   5 mg, Oral, 2 Times Daily      Insulin Lispro 100 UNIT/ML injection  Commonly known as: humaLOG   2-12 Units, Subcutaneous, 4 Times Daily Before Meals & Nightly, Inject subcutaneously four times a day per sliding scale:  0-150 = 0 units; 151-200 = 2u; 201-250 = 4u; 251-300 = 6u; 301-350 = 8u; 351-400 = 10u; 401+ = 12u       oxyCODONE 10 MG tablet  Commonly known as: ROXICODONE   10 mg, Oral, Every 12 Hours PRN      pantoprazole 40 MG EC tablet  Commonly known as: PROTONIX   40 mg, Oral, 2 Times Daily      polyethylene glycol 17 GM/SCOOP powder  Commonly known as: MIRALAX   17 g, Daily PRN      pregabalin 100 MG capsule  Commonly known as: LYRICA   100 mg, Oral, 3 Times Daily      rosuvastatin 10 MG tablet  Commonly known as: CRESTOR   10 mg, Oral, Every Night at Bedtime      sennosides-docusate 8.6-50 MG per tablet  Commonly known as: PERICOLACE   1 tablet, Oral, Nightly, Obtain OTC      sertraline 25 MG tablet  Commonly known as: ZOLOFT   25 mg, Oral, Daily      sodium hypochlorite 0.125 % solution topical solution 0.125%  Commonly known as: DAKIN'S 1/4 STRENGTH   Topical, 2 Times Daily      tamsulosin 0.4 MG capsule 24 hr capsule  Commonly known as: FLOMAX   0.4 mg, Oral, Daily             Stop These Medications      famotidine 20 MG tablet  Commonly known as: PEPCID     lisinopril 5 MG tablet  Commonly known as: PRINIVIL,ZESTRIL     potassium chloride ER 20 MEQ tablet controlled-release ER tablet  Commonly known as: K-TAB              This patient has current or prior documentation of an left ventricular ejection fraction (LVEF) of less than or equal to 40%.    This patient is not prescribed or taking ACE/ARB due to renal disease    The patient was prescribed already taking bisoprolol, carvedilol, or sustained release metoprolol succinate.     Discharge Diet:   Diet Instructions       Diet: Cardiac Diets; Low Sodium (2g)      Discharge Diet: Cardiac Diets    Cardiac Diet: Low Sodium (2g)    Texture: Regular Texture (IDDSI 7)    Fluid Consistency: Thin (IDDSI 0)    Diet: Renal Diets, Diabetic Diets; Consistent Carbohydrate; Regular (IDDSI 7); Thin (IDDSI 0); Low Sodium (2-3g)      Discharge Diet:  Renal Diets  Diabetic Diets       Diabetic Diet: Consistent Carbohydrate    Texture: Regular (IDDSI 7)    Fluid Consistency: Thin (IDDSI  0)    Renal Diet: Low Sodium (2-3g)            Activity at Discharge:   Activity Instructions       Gradually Increase Activity Until at Pre-Hospitalization Level              Follow-up Appointments:   PCP within 1 week to facilitate care with cardiology, nephrology  Cardiology and nephrology follow-up per their recommendations.  Future Appointments   Date Time Provider Department Center   1/15/2025  1:15 PM Emeka Garay MD [facility]   2/17/2025 10:15 AM THERAPIST [facility]   2/17/2025 10:30 AM Lisa Charles DO [facility]       Test Results Pending at Discharge: None    Electronically signed by Kit Johnson MD, 01/13/25, 10:22 CST.    Time: Greater than 30 minutes.

## 2025-01-13 NOTE — PLAN OF CARE
Problem: Adult Inpatient Plan of Care  Goal: Plan of Care Review  Outcome: Progressing  Goal: Patient-Specific Goal (Individualized)  Outcome: Progressing  Goal: Absence of Hospital-Acquired Illness or Injury  Outcome: Progressing  Intervention: Identify and Manage Fall Risk  Recent Flowsheet Documentation  Taken 1/13/2025 0200 by Ezekiel Ramos RN  Safety Promotion/Fall Prevention:   assistive device/personal items within reach   fall prevention program maintained   safety round/check completed  Taken 1/13/2025 0000 by Ezekiel Ramos RN  Safety Promotion/Fall Prevention:   assistive device/personal items within reach   fall prevention program maintained   safety round/check completed  Taken 1/12/2025 2215 by Ezekiel Ramos RN  Safety Promotion/Fall Prevention:   assistive device/personal items within reach   fall prevention program maintained   safety round/check completed  Taken 1/12/2025 2128 by Ezekiel Ramos RN  Safety Promotion/Fall Prevention:   assistive device/personal items within reach   fall prevention program maintained   safety round/check completed  Taken 1/12/2025 2015 by Ezekiel Ramos RN  Safety Promotion/Fall Prevention:   assistive device/personal items within reach   fall prevention program maintained   safety round/check completed  Taken 1/12/2025 1915 by Ezekiel Ramos RN  Safety Promotion/Fall Prevention:   assistive device/personal items within reach   fall prevention program maintained   safety round/check completed  Intervention: Prevent Skin Injury  Recent Flowsheet Documentation  Taken 1/13/2025 0200 by Ezekiel Ramos RN  Body Position: position changed independently  Taken 1/13/2025 0000 by Ezekiel Ramos RN  Body Position: position changed independently  Taken 1/12/2025 2215 by Ezekiel Ramos RN  Body Position: position changed independently  Taken 1/12/2025 2128 by Ezekiel Ramos RN  Body Position: position changed independently  Skin Protection: incontinence pads utilized  Taken  1/12/2025 2015 by Ezekiel Ramos RN  Body Position: position changed independently  Taken 1/12/2025 1915 by Ezekiel Ramos RN  Body Position: position changed independently  Intervention: Prevent and Manage VTE (Venous Thromboembolism) Risk  Recent Flowsheet Documentation  Taken 1/12/2025 2128 by Ezekiel Ramos RN  VTE Prevention/Management: (eliquis) other (see comments)  Intervention: Prevent Infection  Recent Flowsheet Documentation  Taken 1/13/2025 0200 by Ezekiel Ramos RN  Infection Prevention:   hand hygiene promoted   rest/sleep promoted   single patient room provided  Taken 1/13/2025 0000 by Ezekiel Ramos RN  Infection Prevention:   hand hygiene promoted   rest/sleep promoted   single patient room provided  Taken 1/12/2025 2215 by Ezekiel Ramos RN  Infection Prevention:   hand hygiene promoted   rest/sleep promoted   single patient room provided  Taken 1/12/2025 2128 by Ezekiel Ramos RN  Infection Prevention:   hand hygiene promoted   rest/sleep promoted   single patient room provided  Taken 1/12/2025 2015 by Ezekiel Ramos RN  Infection Prevention:   hand hygiene promoted   rest/sleep promoted   single patient room provided  Taken 1/12/2025 1915 by Ezekiel Ramos RN  Infection Prevention:   hand hygiene promoted   rest/sleep promoted   single patient room provided  Goal: Optimal Comfort and Wellbeing  Outcome: Progressing  Intervention: Monitor Pain and Promote Comfort  Recent Flowsheet Documentation  Taken 1/12/2025 2128 by Ezekiel Ramos RN  Pain Management Interventions: medication offered but refused  Intervention: Provide Person-Centered Care  Recent Flowsheet Documentation  Taken 1/12/2025 2128 by Ezekiel Ramos RN  Trust Relationship/Rapport: care explained  Goal: Readiness for Transition of Care  Outcome: Progressing     Problem: Pain Acute  Goal: Optimal Pain Control and Function  Outcome: Progressing  Intervention: Optimize Psychosocial Wellbeing  Recent Flowsheet Documentation  Taken  1/12/2025 2128 by Ezekiel Ramos, RN  Supportive Measures: active listening utilized  Intervention: Develop Pain Management Plan  Recent Flowsheet Documentation  Taken 1/12/2025 2128 by Ezekiel Ramos RN  Pain Management Interventions: medication offered but refused  Intervention: Prevent or Manage Pain  Recent Flowsheet Documentation  Taken 1/12/2025 2128 by Ezekiel Ramos RN  Medication Review/Management: medications reviewed     Problem: Fall Injury Risk  Goal: Absence of Fall and Fall-Related Injury  Outcome: Progressing  Intervention: Identify and Manage Contributors  Recent Flowsheet Documentation  Taken 1/12/2025 2128 by Ezekiel Ramos RN  Medication Review/Management: medications reviewed  Intervention: Promote Injury-Free Environment  Recent Flowsheet Documentation  Taken 1/13/2025 0200 by Ezekiel Ramos RN  Safety Promotion/Fall Prevention:   assistive device/personal items within reach   fall prevention program maintained   safety round/check completed  Taken 1/13/2025 0000 by Ezekiel Ramos RN  Safety Promotion/Fall Prevention:   assistive device/personal items within reach   fall prevention program maintained   safety round/check completed  Taken 1/12/2025 2215 by Ezekiel Ramos RN  Safety Promotion/Fall Prevention:   assistive device/personal items within reach   fall prevention program maintained   safety round/check completed  Taken 1/12/2025 2128 by Ezekiel Ramos RN  Safety Promotion/Fall Prevention:   assistive device/personal items within reach   fall prevention program maintained   safety round/check completed  Taken 1/12/2025 2015 by Ezekiel Ramos, RN  Safety Promotion/Fall Prevention:   assistive device/personal items within reach   fall prevention program maintained   safety round/check completed  Taken 1/12/2025 1915 by Ezekiel Ramos, RN  Safety Promotion/Fall Prevention:   assistive device/personal items within reach   fall prevention program maintained   safety round/check completed      Problem: Skin Injury Risk Increased  Goal: Skin Health and Integrity  Outcome: Progressing  Intervention: Optimize Skin Protection  Recent Flowsheet Documentation  Taken 1/13/2025 0200 by Ezekiel Ramos, RN  Activity Management: up ad ghazal  Taken 1/13/2025 0000 by Ezekiel Ramos, RN  Activity Management: up ad ghazal  Head of Bed (HOB) Positioning: HOB elevated  Taken 1/12/2025 2215 by Ezekiel Ramos, RN  Activity Management: up ad ghazal  Taken 1/12/2025 2128 by Ezekiel Ramos, RN  Activity Management: up ad ghazal  Pressure Reduction Techniques: frequent weight shift encouraged  Pressure Reduction Devices: pressure-redistributing mattress utilized  Skin Protection: incontinence pads utilized  Taken 1/12/2025 2015 by Ezekiel Ramos, RN  Activity Management: up ad ghazal  Taken 1/12/2025 1915 by Ezekiel Ramos, RN  Activity Management: up ad ghazal     Problem: Constipation  Goal: Effective Bowel Elimination  Outcome: Progressing     Goal Outcome Evaluation:  EMR reviewed and POC continued. Pt still complaining of abdominal discomfort despite lactulose and multiple bowel movements. Still requesting morphine for pain control. Denies other needs. SSI for increased blood glucose levels. Wound care to left foot per wound care orders. Monitoring.

## 2025-01-13 NOTE — PROGRESS NOTES
Nephrology ([facility]) Progress Note      Patient:  Erick Luong  YOB: 1955  Date of Service: 1/13/2025  MRN: [id number]   Acct: [id number]   Primary Care Physician: Del Shetty MD  Advance Directive:   Code Status and Medical Interventions: CPR (Attempt to Resuscitate); Full Support   Ordered at: 01/06/25 1800     Code Status (Patient has no pulse and is not breathing):    CPR (Attempt to Resuscitate)     Medical Interventions (Patient has pulse or is breathing):    Full Support     Admit Date: 1/6/2025       Hospital Day: 5  Referring Provider: Latanya Paez MD      Patient personally seen and examined.  Complete chart including Consults, Notes, Operative Reports, Labs, Cardiology, and Radiology studies reviewed as able.        Subjective:  Erick Luong is a 69 y.o. male for whom we were consulted for evaluation and treatment of acute kidney injury. Baseline chronic kidney disease stage 3b, baseline creatinine approximately 1.8. Follows with Dr Lomas in our office. History of poorly controlled dialysis, hypertension, coronary artery disease, chronic diastolic CHF, BRITTNI, diabetic foot ulcer, obesity. On 1/06 was sent to ER from PCP office iwht rapid heart rate and dyspnea. In ER labs revealed creatinine 3.0. Noted to be in atrial fibrillation with RVR. Received IV Bumex and was admitted to medical floor.  Denied n/v/d. Symptoms have improved. Renal function improved since admission but not yet back to baseline level    Today is awake and alert. Feeling generally better. Urine output nonoliguric    Allergies:  Cefepime, Bactrim [sulfamethoxazole-trimethoprim], Vancomycin, Zolpidem, and Metronidazole    Home Meds:  Medications Prior to Admission   Medication Sig Dispense Refill Last Dose/Taking    apixaban (Eliquis) 5 MG tablet tablet Take 1 tablet by mouth 2 (Two) Times a Day. 60 tablet 0 Taking    ascorbic acid (VITAMIN C) 1000 MG tablet Take 1 tablet by mouth Daily.  30 tablet 3 Taking    busPIRone (BUSPAR) 10 MG tablet Take 1 tablet by mouth 3 (Three) Times a Day As Needed (anxiety).   1/6/2025    donepezil (ARICEPT) 10 MG tablet Take 1 tablet by mouth every night at bedtime. 30 tablet 0 Taking    Insulin Glargine (Lantus SoloStar) 100 UNIT/ML injection pen Inject 20 units nightly as directed 15 mL 3 Taking    ipratropium-albuterol (DUO-NEB) 0.5-2.5 mg/3 ml nebulizer Take 3 mL by nebulization 4 (Four) Times a Day As Needed. 360 mL 3 Taking As Needed    nitroglycerin (NITROSTAT) 0.4 MG SL tablet Place 1 tablet as needed under the tongue every 5 minutes. If no improvement after 3 tablets go to ER 25 tablet 0 Taking As Needed    oxyCODONE (ROXICODONE) 10 MG tablet Take 1 tablet by mouth Every 12 (Twelve) Hours As Needed for Moderate Pain.   Taking As Needed    pantoprazole (PROTONIX) 40 MG EC tablet Take 1 tablet by mouth 2 (Two) Times a Day. 90 tablet 4 Taking    potassium chloride ER (K-TAB) 20 MEQ tablet controlled-release ER tablet Take 1 tablet by mouth Daily. 30 tablet 0 Taking    rosuvastatin (CRESTOR) 10 MG tablet Take 1 tablet by mouth every night at bedtime. 30 tablet 2 Taking    sennosides-docusate (PERICOLACE) 8.6-50 MG per tablet Take 1 tablet by mouth Every Night. Obtain OTC   Taking    sertraline (ZOLOFT) 25 MG tablet Take 1 tablet by mouth Daily.   Taking    sodium hypochlorite (DAKIN'S 1/4 STRENGTH) 0.125 % solution topical solution 0.125% Apply  topically to the appropriate area as directed 2 (Two) Times a Day. 473 mL 5 Taking    tamsulosin (FLOMAX) 0.4 MG capsule 24 hr capsule Take 1 capsule by mouth Daily. 90 capsule 4 Taking    docusate sodium 100 MG capsule Take 1 capsule by mouth Daily.       famotidine (PEPCID) 20 MG tablet Take 1 tablet by mouth 2 (Two) Times a Day. 60 tablet 0 Unknown    Insulin Lispro (humaLOG) 100 UNIT/ML injection Inject 2-12 Units under the skin into the appropriate area as directed 4 (Four) Times a Day Before Meals & at Bedtime.  Inject subcutaneously four times a day per sliding scale:  0-150 = 0 units; 151-200 = 2u; 201-250 = 4u; 251-300 = 6u; 301-350 = 8u; 351-400 = 10u; 401+ = 12u       lisinopril (PRINIVIL,ZESTRIL) 5 MG tablet Take 1 tablet by mouth Daily. 30 tablet 0 Unknown    melatonin 3 MG tablet Take 2 tablets by mouth At Night As Needed for Sleep. 30 tablet 0     melatonin 3 MG tablet Take 1 tablet by mouth Daily. 30 tablet 0 Unknown    polyethylene glycol (MIRALAX) 17 GM/SCOOP powder Take 17 g by mouth Daily As Needed (constipation).   Unknown    pregabalin (LYRICA) 100 MG capsule Take 1 capsule by mouth 3 (Three) Times a Day.       [DISCONTINUED] bumetanide (BUMEX) 2 MG tablet Take 1 tablet by mouth 2 (Two) Times a Day.          Medicines:  Current Facility-Administered Medications   Medication Dose Route Frequency Provider Last Rate Last Admin    acetaminophen (TYLENOL) tablet 650 mg  650 mg Oral Q4H PRN Latanya Paez MD   650 mg at 01/09/25 0308    Or    acetaminophen (TYLENOL) 160 MG/5ML oral solution 650 mg  650 mg Oral Q4H PRN Latanya Paez MD        Or    acetaminophen (TYLENOL) suppository 650 mg  650 mg Rectal Q4H PRN Latanya Paez MD        apixaban (ELIQUIS) tablet 5 mg  5 mg Oral BID Latanya Paez MD   5 mg at 01/13/25 0921    sennosides-docusate (PERICOLACE) 8.6-50 MG per tablet 2 tablet  2 tablet Oral BID PRN Latanya Paez MD   2 tablet at 01/11/25 2110    And    polyethylene glycol (MIRALAX) packet 17 g  17 g Oral Daily PRN Latanya Paez MD   17 g at 01/11/25 0853    And    bisacodyl (DULCOLAX) EC tablet 5 mg  5 mg Oral Daily PRN Latanya Paez MD   5 mg at 01/11/25 1931    And    bisacodyl (DULCOLAX) suppository 10 mg  10 mg Rectal Daily PRN Latanya Paez MD   10 mg at 01/09/25 0328    busPIRone (BUSPAR) tablet 10 mg  10 mg Oral TID PRN Latanya Paez MD   10 mg at 01/12/25 2129    calcitriol (ROCALTROL) capsule 0.25 mcg  0.25 mcg Oral Daily Vinny Hartley MD   0.25 mcg at 01/13/25 0987     Calcium Replacement - Follow Nurse / BPA Driven Protocol   Not Applicable PRN Latanya Paez MD        dextrose (D50W) (25 g/50 mL) IV injection 25 g  25 g Intravenous Q15 Min PRN Ltaanya Paez MD        dextrose (GLUTOSE) oral gel 15 g  15 g Oral Q15 Min PRN Latanya Paez MD        donepezil (ARICEPT) tablet 10 mg  10 mg Oral Nightly Latanya Paez MD   10 mg at 01/12/25 2129    empagliflozin (JARDIANCE) tablet 10 mg  10 mg Oral Daily Pepe Barajas MD   10 mg at 01/13/25 0921    glucagon (GLUCAGEN) injection 1 mg  1 mg Intramuscular Q15 Min PRN Latanya Paez MD        hydrALAZINE (APRESOLINE) tablet 10 mg  10 mg Oral Q8H Donna Roman APRN   10 mg at 01/13/25 0536    Insulin Lispro (humaLOG) injection 2-9 Units  2-9 Units Subcutaneous 4x Daily AC & at Bedtime Latanya Paez MD   4 Units at 01/12/25 2135    ipratropium-albuterol (DUO-NEB) nebulizer solution 3 mL  3 mL Nebulization 4x Daily PRN Latanya Paez MD        isosorbide dinitrate (ISORDIL) tablet 10 mg  10 mg Oral TID - Nitrates Donna Roman APRN   10 mg at 01/13/25 0921    lactulose solution 30 g  30 g Oral TID Latanya Paez MD   30 g at 01/13/25 0921    Magnesium Standard Dose Replacement - Follow Nurse / BPA Driven Protocol   Not Applicable PRN Latanya Paez MD        metoprolol succinate XL (TOPROL-XL) 24 hr tablet 50 mg  50 mg Oral Q24H Ezekiel Roman APRN   50 mg at 01/13/25 0921    naloxone (NARCAN) injection 0.4 mg  0.4 mg Intravenous Q5 Min PRN Latanya Paez MD        nitroglycerin (NITROSTAT) SL tablet 0.4 mg  0.4 mg Sublingual Q5 Min PRN Latanya Paez MD   0.4 mg at 01/07/25 2353    nitroglycerin (NITROSTAT) SL tablet 0.4 mg  0.4 mg Sublingual Q5 Min PRN Kit Johnson MD        ondansetron (ZOFRAN) injection 4 mg  4 mg Intravenous Q6H PRN Latanya Paez MD   4 mg at 01/10/25 1703    pantoprazole (PROTONIX) EC tablet 40 mg  40 mg Oral BID Latanya Paez MD   40 mg at 01/13/25 0921    Phosphorus  Replacement - Follow Nurse / BPA Driven Protocol   Not Applicable PRLatanya Cid MD        Potassium Replacement - Follow Nurse / BPA Driven Protocol   Not Applicable PRLatanya Cid MD        pregabalin (LYRICA) capsule 100 mg  100 mg Oral TID Kit Johnson MD        rosuvastatin (CRESTOR) tablet 10 mg  10 mg Oral Nightly Michelle Valle, APRN   10 mg at 01/12/25 2129    sertraline (ZOLOFT) tablet 25 mg  25 mg Oral Daily Deborah Real, APRN   25 mg at 01/13/25 0921    sodium chloride 0.9 % flush 10 mL  10 mL Intravenous Q12H Latanya Paez MD   10 mL at 01/13/25 0936    sodium chloride 0.9 % flush 10 mL  10 mL Intravenous PRN Latanya Paez MD        sodium chloride 0.9 % infusion 40 mL  40 mL Intravenous PRN Latanya Paez MD           Past Medical History:  Past Medical History:   Diagnosis Date    Arthritis     Autonomic disease     CAD (coronary artery disease) 02/06/2017    Cervical radiculopathy 09/16/2021    Chronic constipation with acute exaccerbation 05/10/2021    Coronary artery disease     Degeneration of cervical intervertebral disc 08/11/2021    Diabetes mellitus     Diabetic foot ulcer 08/31/2020    Diabetic polyneuropathy associated with type 2 diabetes mellitus 01/18/2021    Elevated cholesterol     Gastroesophageal reflux disease 05/13/2019    Headache     HTN (hypertension), benign 05/03/2017    Hyperlipidemia     Hypertension     Mixed hyperlipidemia 02/07/2017    Multiple lung nodules 01/26/2020    5mm, 9 mm RLL identified 1/2020, not present 10/2019.    Myocardial infarction     Osteomyelitis 01/22/2020    Osteomyelitis of fifth toe of right foot 10/07/2019    Pancreatitis     Persistent insomnia 01/20/2020    Renal disorder     Sleep apnea 02/06/2017    Sleep apnea with use of continuous positive airway pressure (CPAP)     NON-COMPLIANT    Slow transit constipation 01/16/2019    Spinal stenosis in cervical region 09/16/2021    Vitamin D deficiency 03/02/2021        Past Surgical History:  Past Surgical History:   Procedure Laterality Date    ABDOMINAL SURGERY      AMPUTATION FOOT / TOE Left 10/2021    5th digit     ANTERIOR CERVICAL DISCECTOMY W/ FUSION N/A 08/05/2022    Procedure: CERVICAL DISCECTOMY ANTERIOR WITH FUSION C5-6 with possible plating of C5-7 with neuromonitoring and 1 c-arm;  Surgeon: Karel Soliz MD;  Location: [facility] OR;  Service: Neurosurgery;  Laterality: N/A;    APPENDECTOMY      BACK SURGERY      CARDIAC CATHETERIZATION Left 02/08/2021    Procedure: Left Heart Cath w poss intervention left anatomical snuff box acess;  Surgeon: Omkar Charles DO;  Location: [facility] CATH INVASIVE LOCATION;  Service: Cardiology;  Laterality: Left;    CARDIAC SURGERY      CATARACT EXTRACTION      CERVICAL SPINE SURGERY      COLONOSCOPY N/A 01/31/2017    Normal exam repeat in 5 years    COLONOSCOPY N/A 02/11/2019    Mild acute inflammation    COLONOSCOPY N/A 04/07/2024    2 areas at 10 and 20 cm with friability ulceration 2 clips placed at 20 cm and 4 clips at 10 cm poor prep normal mucosa,mild eroisions and ulcerations in visible vessels    COLONOSCOPY N/A 7/1/2024    Procedure: COLONOSCOPY WITH ANESTHESIA;  Surgeon: Arsalan Lorenzo DO;  Location: [facility] ENDOSCOPY;  Service: Gastroenterology;  Laterality: N/A;  pre op constipation/diarrhea  post poor prep  pcp Del Shetty    COLONOSCOPY N/A 7/2/2024    Procedure: COLONOSCOPY WITH ANESTHESIA;  Surgeon: Agapito Christopher MD;  Location: [facility] ENDOSCOPY;  Service: Gastroenterology;  Laterality: N/A;  pre rectal bleeding  post poor prep  pcp Del Shetty MD    COLONOSCOPY W/ POLYPECTOMY  03/04/2014    Hyperplastic polyp    CORONARY ARTERY BYPASS GRAFT  10/2015    ENDOSCOPY  04/13/2011    Gastritis with hemorrhage    ENDOSCOPY N/A 05/05/2017    Normal exam    ENDOSCOPY N/A 02/11/2019    Gastritis    ENDOSCOPY N/A 09/01/2020    Non-erosive gastritis with hemorrhage    ENDOSCOPY N/A 02/10/2021     Esophagitis    ENDOSCOPY N/A 2024    There were esophageal mucosal changes suspicious for short-segment Low's esophagus present in the distal esophagus. The maximum longitudinal extent of these mucosal changes was 2 cm in length. Mucosa was biopsied with a cold forceps for histologyDistal esophagus, biopsies: Mild chronic active esophagogastritis. No evidence of intestinal metaplasia, dysplasia. Antrum, bx, Mild chronic gastritis    FOOT SURGERY Left     INCISION AND DRAINAGE OF WOUND Left 2007    spider bite       Family History  Family History   Problem Relation Age of Onset    Colon cancer Father     Heart disease Father     Colon cancer Sister     Colon polyps Sister     Alzheimer's disease Mother     Coronary artery disease Sister     Coronary artery disease Sister        Social History  Social History     Socioeconomic History    Marital status:    Tobacco Use    Smoking status: Former     Current packs/day: 0.00     Types: Cigarettes     Quit date:      Years since quittin.0    Smokeless tobacco: Never    Tobacco comments:     smoked in [employer]   Vaping Use    Vaping status: Never Used   Substance and Sexual Activity    Alcohol use: No    Drug use: No    Sexual activity: Defer       Review of Systems:  History obtained from chart review and the patient  General ROS: No fever or chills  Respiratory ROS: No cough, shortness of breath, wheezing  Cardiovascular ROS: No chest pain or palpitations  Gastrointestinal ROS: No abdominal pain or melena  Genito-Urinary ROS: No dysuria or hematuria  Psych ROS: No anxiety and depression  14 point ROS reviewed with the patient and negative except as noted above and in the HPI unless unable to obtain.    Objective:  Patient Vitals for the past 24 hrs:   BP Temp Temp src Pulse Resp SpO2 Weight   25 0820 158/79 97.6 °F (36.4 °C) Oral 70 18 96 % --   25 0535 126/76 97.5 °F (36.4 °C) Oral 74 16 97 % --   25 0300 -- -- -- -- -- --  117 kg (258 lb 6.4 oz)   01/12/25 2118 132/65 98 °F (36.7 °C) Oral 89 18 96 % --   01/12/25 1528 112/71 97.7 °F (36.5 °C) Oral 81 16 97 % --   01/12/25 1153 125/81 97.7 °F (36.5 °C) Oral 84 16 100 % --     No intake or output data in the 24 hours ending 01/13/25 1041  General: awake/alert   Chest:  clear to auscultation bilaterally without respiratory distress  CVS: regular rate and rhythm  Abdominal: soft, nontender, positive bowel sounds  Extremities: no cyanosis or edema  Skin: warm and dry without rash      Labs:  Results from last 7 days   Lab Units 01/13/25  0408 01/12/25 0457 01/11/25  0756   WBC 10*3/mm3 7.59 8.94 8.89   HEMOGLOBIN g/dL 9.9* 10.3* 10.0*   HEMATOCRIT % 31.2* 33.0* 32.7*   PLATELETS 10*3/mm3 424 464* 419         Results from last 7 days   Lab Units 01/13/25  0408 01/12/25 0457 01/11/25  0756   SODIUM mmol/L 143 141 138   POTASSIUM mmol/L 4.2 4.7 4.9   CHLORIDE mmol/L 104 102 103   CO2 mmol/L 23.0 23.0 19.0*   BUN mg/dL 50* 58* 64*   CREATININE mg/dL 2.62* 2.77* 2.92*   CALCIUM mg/dL 9.1 9.2 9.2   EGFR mL/min/1.73 25.8* 24.1* 22.7*   BILIRUBIN mg/dL 0.3 0.3 0.4   ALK PHOS U/L 152* 162* 160*   ALT (SGPT) U/L 11 13 14   AST (SGOT) U/L 12 13 14   GLUCOSE mg/dL 133* 170* 187*       Radiology:   Imaging Results (Last 72 Hours)       Procedure Component Value Units Date/Time    XR Abdomen KUB [[id number]] Collected: 01/10/25 1531     Updated: 01/10/25 1535    Narrative:      EXAMINATION:  XR ABDOMEN KUB-  1/10/2025 2:29 PM     HISTORY: Abdominal pain.     COMPARISON: No comparison study.     TECHNIQUE: Supine abdomen.     FINDINGS:   The bowel gas pattern is normal. No small bowel distention  is seen. There are cholecystectomy clips in the right upper quadrant.  There is no organomegaly or mass-effect. There are some degenerative  changes of the spine. There is enthesopathy of the pelvis..          Impression:      No acute findings.           This report was signed and finalized on 1/10/2025 3:32  PM by Dr. Raz Mendes MD.               Culture:  Blood Culture   Date Value Ref Range Status   01/06/2025 No growth at 5 days  Final   01/06/2025 No growth at 5 days  Final         Assessment    Acute kidney injury, ATN--improving  Baseline chronic kidney disease stage 3b  Type 2 diabetes  Hypertension  Anemia of CKD  Obesity   Metabolic acidosis    Plan:   Renal function improving  OK for discharge from renal standpoint. Follow up in office in 2 weeks      Stewart Alvarez, APRN  1/13/2025  10:41 CST

## 2025-01-14 NOTE — OUTREACH NOTE
Prep Survey      Flowsheet Row Responses   [facility] facility patient discharged from? [place]   Is LACE score < 7 ? No   Eligibility Readm Mgmt   Discharge diagnosis Acute kidney injury   Does the patient have one of the following disease processes/diagnoses(primary or secondary)? Other   Does the patient have Home health ordered? No   Is there a DME ordered? No   Prep survey completed? Yes            MARIA DE JESUS RHODES - Registered Nurse

## 2025-01-15 ENCOUNTER — OFFICE VISIT (OUTPATIENT)
Dept: CARDIOLOGY | Facility: CLINIC | Age: 70
End: 2025-01-15
Payer: MEDICARE

## 2025-01-15 VITALS
BODY MASS INDEX: 34.95 KG/M2 | HEIGHT: 72 IN | DIASTOLIC BLOOD PRESSURE: 68 MMHG | HEART RATE: 88 BPM | OXYGEN SATURATION: 99 % | SYSTOLIC BLOOD PRESSURE: 120 MMHG | WEIGHT: 258 LBS

## 2025-01-15 DIAGNOSIS — I48.21 PERMANENT ATRIAL FIBRILLATION: ICD-10-CM

## 2025-01-15 DIAGNOSIS — I25.10 CORONARY ARTERY DISEASE INVOLVING NATIVE CORONARY ARTERY OF NATIVE HEART WITHOUT ANGINA PECTORIS: Primary | ICD-10-CM

## 2025-01-15 DIAGNOSIS — I50.22 CHRONIC SYSTOLIC CONGESTIVE HEART FAILURE: ICD-10-CM

## 2025-01-15 NOTE — PROGRESS NOTES
Subjective:     Encounter Date:01/15/2025      Patient ID: Erick Luong is a 69 y.o. male with coronary artery disease, prior coronary artery bypass grafting, chronic noncardiac chest pain, morbid obesity, type 2 diabetes mellitus, left ventricular systolic dysfunction, chronic renal insufficiency, permanent atrial fibrillation, presenting today for hospital follow-up.    Chief Complaint: Hospital follow-up    History of Present Illness    This patient presents today for quick hospital follow-up after recent hospitalization for a multitude of issues including acute renal failure, chest discomfort, abdominal pain, etc.  During the hospitalization, the patient had consultations from both cardiology and nephrology.  The patient was given diuretic therapy and responded well with an improvement in shortness of breath.  He notes that shortness of breath has been stable since being out of the hospital and weight has been stable.  He denies having significant shortness of breath at this particular time.  He also denies having significant lower extremity edema.  When hospitalized, he was having chest and abdominal pain which he tells me have both resolved at this time.  He is generally weak and fatigued but that is not necessarily unusual for him to some degree.  He denies having cough or wheezing.  He has scheduled follow-up with his primary care provider as well as the heart failure clinic tomorrow.  During the hospitalization, his ejection fraction was found to be slightly lower than previously noted.  Due to his renal failure, he was not started on Entresto or ACE inhibitor/angiotensin receptor blockers but was rather started on hydralazine and Isordil, which he has tolerated well.  Blood pressure has been stable.      Current Outpatient Medications:     apixaban (Eliquis) 5 MG tablet tablet, Take 1 tablet by mouth 2 (Two) Times a Day., Disp: 60 tablet, Rfl: 11    ascorbic acid (VITAMIN C) 1000 MG tablet, Take 1  tablet by mouth Daily., Disp: 30 tablet, Rfl: 3    bumetanide (BUMEX) 2 MG tablet, Take 1 tablet by mouth Daily for 30 days., Disp: 30 tablet, Rfl: 0    busPIRone (BUSPAR) 10 MG tablet, Take 1 tablet by mouth 3 (Three) Times a Day As Needed (anxiety)., Disp: , Rfl:     calcitriol (ROCALTROL) 0.25 MCG capsule, Take 1 capsule by mouth Daily for 30 days., Disp: 30 capsule, Rfl: 0    docusate sodium 100 MG capsule, Take 1 capsule by mouth Daily., Disp: , Rfl:     donepezil (ARICEPT) 10 MG tablet, Take 1 tablet by mouth every night at bedtime., Disp: 30 tablet, Rfl: 0    empagliflozin (JARDIANCE) 10 MG tablet tablet, Take 1 tablet by mouth Daily., Disp: 30 tablet, Rfl: 0    hydrALAZINE (APRESOLINE) 10 MG tablet, Take 1 tablet by mouth Every 8 (Eight) Hours for 30 days., Disp: 90 tablet, Rfl: 0    Insulin Glargine (Lantus SoloStar) 100 UNIT/ML injection pen, Inject 20 units nightly as directed, Disp: 15 mL, Rfl: 3    Insulin Lispro (humaLOG) 100 UNIT/ML injection, Inject 2-12 Units under the skin into the appropriate area as directed 4 (Four) Times a Day Before Meals & at Bedtime. Inject subcutaneously four times a day per sliding scale:  0-150 = 0 units; 151-200 = 2u; 201-250 = 4u; 251-300 = 6u; 301-350 = 8u; 351-400 = 10u; 401+ = 12u, Disp: , Rfl:     ipratropium-albuterol (DUO-NEB) 0.5-2.5 mg/3 ml nebulizer, Take 3 mL by nebulization 4 (Four) Times a Day As Needed., Disp: 360 mL, Rfl: 3    isosorbide dinitrate (ISORDIL) 10 MG tablet, Take 1 tablet by mouth 3 (Three) Times a Day for 30 days., Disp: 90 tablet, Rfl: 0    lactulose 20 GM/30ML solution solution, Take 45 mL by mouth 3 (Three) Times a Day., Disp: 450 mL, Rfl: 0    melatonin 3 MG tablet, Take 2 tablets by mouth At Night As Needed for Sleep., Disp: 30 tablet, Rfl: 0    metoprolol succinate XL (TOPROL-XL) 50 MG 24 hr tablet, Take 1 tablet by mouth Daily for 30 days., Disp: 30 tablet, Rfl: 0    nitroglycerin (NITROSTAT) 0.4 MG SL tablet, Place 1 tablet as needed  "under the tongue every 5 minutes. If no improvement after 3 tablets go to ER, Disp: 25 tablet, Rfl: 0    oxyCODONE (ROXICODONE) 10 MG tablet, Take 1 tablet by mouth Every 12 (Twelve) Hours As Needed for Moderate Pain., Disp: , Rfl:     pantoprazole (PROTONIX) 40 MG EC tablet, Take 1 tablet by mouth 2 (Two) Times a Day., Disp: 90 tablet, Rfl: 4    polyethylene glycol (MIRALAX) 17 GM/SCOOP powder, Take 17 g by mouth Daily As Needed (constipation)., Disp: , Rfl:     pregabalin (LYRICA) 100 MG capsule, Take 1 capsule by mouth 3 (Three) Times a Day., Disp: , Rfl:     rosuvastatin (CRESTOR) 10 MG tablet, Take 1 tablet by mouth every night at bedtime., Disp: 30 tablet, Rfl: 2    sennosides-docusate (PERICOLACE) 8.6-50 MG per tablet, Take 1 tablet by mouth Every Night. Obtain OTC, Disp: , Rfl:     sertraline (ZOLOFT) 25 MG tablet, Take 1 tablet by mouth Daily., Disp: , Rfl:     sodium hypochlorite (DAKIN'S 1/4 STRENGTH) 0.125 % solution topical solution 0.125%, Apply  topically to the appropriate area as directed 2 (Two) Times a Day., Disp: 473 mL, Rfl: 5    tamsulosin (FLOMAX) 0.4 MG capsule 24 hr capsule, Take 1 capsule by mouth Daily., Disp: 90 capsule, Rfl: 4    Allergies   Allergen Reactions    Cefepime Hives and Anaphylaxis    Bactrim [Sulfamethoxazole-Trimethoprim] Other (See Comments)     \"RENAL FAILURE\"    Vancomycin Itching    Zolpidem Mental Status Change     \"makes him crazy\"    Metronidazole Rash     Social History     Tobacco Use    Smoking status: Former     Current packs/day: 0.00     Types: Cigarettes     Quit date:      Years since quittin.0    Smokeless tobacco: Never    Tobacco comments:     [place]   Substance Use Topics    Alcohol use: No     Review of Systems   Constitutional: Positive for malaise/fatigue. Negative for weight gain.   Cardiovascular:  Negative for chest pain, dyspnea on exertion, leg swelling, orthopnea, palpitations, paroxysmal nocturnal dyspnea and syncope. " "  Respiratory:  Negative for cough, shortness of breath and wheezing.    Hematologic/Lymphatic: Negative for bleeding problem. Does not bruise/bleed easily.   Gastrointestinal:  Negative for abdominal pain, hematemesis, hematochezia, melena, nausea and vomiting.   Neurological:  Positive for weakness. Negative for light-headedness.         ECG 12 Lead    Date/Time: 1/15/2025 2:13 PM  Performed by: Emeka Garay MD    Authorized by: Emeka Garay MD  Comparison: compared with previous ECG   Similar to previous ECG  Rhythm: atrial fibrillation  Rate: normal  BPM: 90  Conduction: conduction normal  QRS axis: left  Other findings: non-specific ST-T wave changes and poor R wave progression    Clinical impression: abnormal EKG           Objective:     Vitals reviewed.   Constitutional:       General: Not in acute distress.     Appearance: Well-developed and not in distress. Obese. Chronically ill-appearing.   HENT:      Head: Normocephalic and atraumatic.   Neck:      Vascular: No carotid bruit or JVD.   Pulmonary:      Effort: Pulmonary effort is normal.      Breath sounds: Normal breath sounds. No wheezing. No rhonchi. No rales.   Cardiovascular:      Normal rate. Irregularly irregular rhythm.      Murmurs: There is no murmur.      No gallop.    Pulses:     Intact distal pulses.   Edema:     Peripheral edema absent.   Abdominal:      General: Bowel sounds are normal. There is no distension.      Palpations: Abdomen is soft.      Tenderness: There is no abdominal tenderness.   Musculoskeletal:      Comments: Boot noted on left foot Skin:     General: Skin is warm and dry. There is no cyanosis.      Findings: No erythema or rash.   Neurological:      Mental Status: Alert. Mental status is at baseline.       /68   Pulse 88   Ht 182.9 cm (72\")   Wt 117 kg (258 lb)   SpO2 99%   BMI 34.99 kg/m²     Data/Lab Review:     Lab Results   Component Value Date    WBC 7.59 01/13/2025    HGB 9.9 (L) " 01/13/2025    HCT 31.2 (L) 01/13/2025    MCV 90.2 01/13/2025     01/13/2025     Lab Results   Component Value Date    GLUCOSE 133 (H) 01/13/2025    CALCIUM 9.1 01/13/2025     01/13/2025    K 4.2 01/13/2025    CO2 23.0 01/13/2025     01/13/2025    BUN 50 (H) 01/13/2025    CREATININE 2.62 (H) 01/13/2025    EGFR 25.8 (L) 01/13/2025    BCR 19.1 01/13/2025    ANIONGAP 16.0 (H) 01/13/2025     Results for orders placed during the hospital encounter of 01/06/25    Adult Transthoracic Echo Complete W/ Cont if Necessary Per Protocol    Interpretation Summary    Left ventricular systolic function is moderately decreased. Left ventricular ejection fraction appears to be 36 - 40%.    Left ventricular wall thickness is consistent with mild concentric hypertrophy.    Normal right ventricular cavity size noted. Mildly reduced right ventricular systolic function noted.    No significant valvular abnormalities identified on this study.        Assessment:          Diagnosis Plan   1. Coronary artery disease involving native coronary artery of native heart without angina pectoris  ECG 12 Lead      2. Chronic systolic congestive heart failure        3. Permanent atrial fibrillation  apixaban (Eliquis) 5 MG tablet tablet             Plan:       1.  Coronary artery disease: The patient is stable at this time.  When he was hospitalized, he had chest discomfort which was atypical, very similar to prior episodes of noncardiac chest pain.  His workup showed flat levels of troponin.  A stress test was not thought to be indicated and I still feel that a stress test is not indicated at this time, particularly given the patient's chronic noncardiac complaints of chest discomfort.  I cannot determine that there is anything new about the patient's recent episode of chest discomfort as he basically had a pan positive review of systems at that time as well.  He is not on aspirin as he is chronically anticoagulated.  He is on  beta-blocker therapy with Toprol-XL he is also on statin therapy.  In addition, he is now on Isordil as part of heart failure therapy.  I would not consider an ischemic evaluation for this patient at any time unless he develops an acute coronary syndrome.    2.  Chronic systolic heart failure: The patient's ejection fraction was found to be moderately reduced during his hospital stay but this was compared to an echocardiogram that had been quite sometime ago.  Therefore, it is unclear if this is a new change but is definitely something that we diagnosed during the hospitalization.  He did not appear to be profoundly volume overloaded to me during the hospitalization but nephrology had already been involved in his care and placed him on diuretic therapy.  He now says that his breathing is somewhat better.  He is on Bumex 2 mg daily and his weight is stable at this time.  He is on Toprol-XL and currently on hydralazine and Isordil and also takes Jardiance.  He has scheduled follow-up in the heart failure clinic tomorrow.  In the heart failure clinic, the patient will most assuredly receive educational material about proper cardiac diet, low-sodium, fluid restriction.  Should be noted during the hospitalization, at 1 point, the patient was eating a meal from [employer], certainly not a cardiac diet at that particular time.    3.  Permanent atrial fibrillation: Asymptomatic with heart rate well-controlled at this time.  The patient remains anticoagulated with Eliquis.    [id number] SCORE   [id number] Score: 5 (1/15/2025  2:19 PM)    The patient will follow-up in the interventional cardiology clinic in 6 months unless otherwise needed sooner.  He will follow-up in the heart failure clinic tomorrow and also has follow-up with his primary care provider tomorrow

## 2025-01-17 ENCOUNTER — READMISSION MANAGEMENT (OUTPATIENT)
Dept: CALL CENTER | Facility: HOSPITAL | Age: 70
End: 2025-01-17
Payer: MEDICARE

## 2025-01-17 NOTE — OUTREACH NOTE
Medical Week 1 Survey      Flowsheet Row Responses   [facility] patient discharged from? [place]   Does the patient have one of the following disease processes/diagnoses(primary or secondary)? Other   Week 1 attempt successful? Yes   Call start time 1702   Revoke Decline to participate   Call end time 1704   Person spoke with today (if not patient) and relationship Patient and spouse   Call end time 1704            LU YI - Registered Nurse

## 2025-01-20 ENCOUNTER — TRANSCRIBE ORDERS (OUTPATIENT)
Dept: ADMINISTRATIVE | Facility: HOSPITAL | Age: 70
End: 2025-01-20
Payer: MEDICARE

## 2025-01-20 DIAGNOSIS — N40.1 BENIGN PROSTATIC HYPERPLASIA WITH LOWER URINARY TRACT SYMPTOMS, SYMPTOM DETAILS UNSPECIFIED: ICD-10-CM

## 2025-01-20 DIAGNOSIS — E11.42 TYPE 2 DIABETES MELLITUS WITH DIABETIC POLYNEUROPATHY, UNSPECIFIED WHETHER LONG TERM INSULIN USE: ICD-10-CM

## 2025-01-20 DIAGNOSIS — E78.5 HYPERLIPIDEMIA, UNSPECIFIED HYPERLIPIDEMIA TYPE: ICD-10-CM

## 2025-01-20 DIAGNOSIS — F03.90 DEMENTIA WITHOUT BEHAVIORAL DISTURBANCE, PSYCHOTIC DISTURBANCE, MOOD DISTURBANCE, OR ANXIETY, UNSPECIFIED DEMENTIA SEVERITY, UNSPECIFIED DEMENTIA TYPE: Primary | ICD-10-CM

## 2025-02-21 ENCOUNTER — APPOINTMENT (OUTPATIENT)
Dept: GENERAL RADIOLOGY | Age: 70
DRG: 291 | End: 2025-02-21
Payer: MEDICARE

## 2025-02-21 ENCOUNTER — HOSPITAL ENCOUNTER (INPATIENT)
Age: 70
LOS: 3 days | Discharge: HOME | DRG: 291 | End: 2025-02-24
Attending: EMERGENCY MEDICINE | Admitting: INTERNAL MEDICINE
Payer: MEDICARE

## 2025-02-21 DIAGNOSIS — L97.429 DIABETIC ULCER OF LEFT HEEL ASSOCIATED WITH TYPE 2 DIABETES MELLITUS, UNSPECIFIED ULCER STAGE (HCC): ICD-10-CM

## 2025-02-21 DIAGNOSIS — I50.9 ACUTE ON CHRONIC CONGESTIVE HEART FAILURE, UNSPECIFIED HEART FAILURE TYPE (HCC): Primary | ICD-10-CM

## 2025-02-21 DIAGNOSIS — N18.9 ACUTE ON CHRONIC RENAL INSUFFICIENCY: ICD-10-CM

## 2025-02-21 DIAGNOSIS — E11.621 DIABETIC ULCER OF LEFT HEEL ASSOCIATED WITH TYPE 2 DIABETES MELLITUS, UNSPECIFIED ULCER STAGE (HCC): ICD-10-CM

## 2025-02-21 DIAGNOSIS — N28.9 ACUTE ON CHRONIC RENAL INSUFFICIENCY: ICD-10-CM

## 2025-02-21 PROBLEM — N18.4 CKD (CHRONIC KIDNEY DISEASE) STAGE 4, GFR 15-29 ML/MIN (HCC): Status: ACTIVE | Noted: 2025-02-21

## 2025-02-21 PROBLEM — E87.70 VOLUME OVERLOAD: Status: ACTIVE | Noted: 2025-02-21

## 2025-02-21 PROBLEM — I13.10 CARDIORENAL SYNDROME: Status: ACTIVE | Noted: 2025-02-21

## 2025-02-21 PROBLEM — I50.23 ACUTE ON CHRONIC SYSTOLIC HEART FAILURE (HCC): Status: ACTIVE | Noted: 2025-02-21

## 2025-02-21 LAB
ALBUMIN SERPL-MCNC: 4.2 G/DL (ref 3.5–5.2)
ALP SERPL-CCNC: 130 U/L (ref 40–129)
ALT SERPL-CCNC: 22 U/L (ref 5–41)
ANION GAP SERPL CALCULATED.3IONS-SCNC: 20 MMOL/L (ref 8–16)
AST SERPL-CCNC: 29 U/L (ref 5–40)
B PARAP IS1001 DNA NPH QL NAA+NON-PROBE: NOT DETECTED
B PERT.PT PRMT NPH QL NAA+NON-PROBE: NOT DETECTED
BACTERIA URNS QL MICRO: NEGATIVE /HPF
BASOPHILS # BLD: 0.1 K/UL (ref 0–0.2)
BASOPHILS NFR BLD: 0.8 % (ref 0–1)
BILIRUB SERPL-MCNC: 0.5 MG/DL (ref 0.2–1.2)
BILIRUB UR QL STRIP: NEGATIVE
BNP BLD-MCNC: 8096 PG/ML (ref 0–124)
BUN SERPL-MCNC: 75 MG/DL (ref 8–23)
C PNEUM DNA NPH QL NAA+NON-PROBE: NOT DETECTED
CALCIUM SERPL-MCNC: 9 MG/DL (ref 8.8–10.2)
CHLORIDE SERPL-SCNC: 100 MMOL/L (ref 98–107)
CLARITY UR: CLEAR
CO2 SERPL-SCNC: 19 MMOL/L (ref 22–29)
COLOR UR: YELLOW
CREAT SERPL-MCNC: 3.3 MG/DL (ref 0.7–1.2)
CRYSTALS URNS MICRO: NORMAL /HPF
EOSINOPHIL # BLD: 0.4 K/UL (ref 0–0.6)
EOSINOPHIL NFR BLD: 3.7 % (ref 0–5)
EPI CELLS #/AREA URNS AUTO: 0 /HPF (ref 0–5)
ERYTHROCYTE [DISTWIDTH] IN BLOOD BY AUTOMATED COUNT: 15.6 % (ref 11.5–14.5)
FERRITIN SERPL-MCNC: 397.3 NG/ML (ref 30–400)
FLUAV RNA NPH QL NAA+NON-PROBE: NOT DETECTED
FLUBV RNA NPH QL NAA+NON-PROBE: NOT DETECTED
GLUCOSE BLD-MCNC: 167 MG/DL (ref 70–99)
GLUCOSE BLD-MCNC: 183 MG/DL (ref 70–99)
GLUCOSE BLD-MCNC: 225 MG/DL (ref 70–99)
GLUCOSE BLD-MCNC: 265 MG/DL (ref 70–99)
GLUCOSE SERPL-MCNC: 275 MG/DL (ref 70–99)
GLUCOSE UR STRIP.AUTO-MCNC: 100 MG/DL
HADV DNA NPH QL NAA+NON-PROBE: NOT DETECTED
HCOV 229E RNA NPH QL NAA+NON-PROBE: NOT DETECTED
HCOV HKU1 RNA NPH QL NAA+NON-PROBE: NOT DETECTED
HCOV NL63 RNA NPH QL NAA+NON-PROBE: NOT DETECTED
HCOV OC43 RNA NPH QL NAA+NON-PROBE: NOT DETECTED
HCT VFR BLD AUTO: 31.2 % (ref 42–52)
HGB BLD-MCNC: 9.6 G/DL (ref 14–18)
HGB UR STRIP.AUTO-MCNC: NEGATIVE MG/L
HMPV RNA NPH QL NAA+NON-PROBE: NOT DETECTED
HPIV1 RNA NPH QL NAA+NON-PROBE: NOT DETECTED
HPIV2 RNA NPH QL NAA+NON-PROBE: NOT DETECTED
HPIV3 RNA NPH QL NAA+NON-PROBE: NOT DETECTED
HPIV4 RNA NPH QL NAA+NON-PROBE: NOT DETECTED
HYALINE CASTS #/AREA URNS AUTO: 1 /HPF (ref 0–8)
IMM GRANULOCYTES # BLD: 0 K/UL
IRON SATN MFR SERPL: 9 % (ref 20–50)
IRON SERPL-MCNC: 28 UG/DL (ref 59–158)
KETONES UR STRIP.AUTO-MCNC: NEGATIVE MG/DL
LEUKOCYTE ESTERASE UR QL STRIP.AUTO: NEGATIVE
LYMPHOCYTES # BLD: 1.1 K/UL (ref 1.1–4.5)
LYMPHOCYTES NFR BLD: 9.5 % (ref 20–40)
M PNEUMO DNA NPH QL NAA+NON-PROBE: NOT DETECTED
MCH RBC QN AUTO: 29 PG (ref 27–31)
MCHC RBC AUTO-ENTMCNC: 30.8 G/DL (ref 33–37)
MCV RBC AUTO: 94.3 FL (ref 80–94)
MONOCYTES # BLD: 1.1 K/UL (ref 0–0.9)
MONOCYTES NFR BLD: 9.5 % (ref 0–10)
NEUTROPHILS # BLD: 8.5 K/UL (ref 1.5–7.5)
NEUTS SEG NFR BLD: 76.1 % (ref 50–65)
NITRITE UR QL STRIP.AUTO: NEGATIVE
PERFORMED ON: ABNORMAL
PH UR STRIP.AUTO: 5.5 [PH] (ref 5–8)
PLATELET # BLD AUTO: 214 K/UL (ref 130–400)
PMV BLD AUTO: 12 FL (ref 9.4–12.4)
POTASSIUM SERPL-SCNC: 5.1 MMOL/L (ref 3.5–5.1)
PROCALCITONIN: 0.1 NG/ML (ref 0–0.09)
PROT SERPL-MCNC: 7.2 G/DL (ref 6.4–8.3)
PROT UR STRIP.AUTO-MCNC: 100 MG/DL
RBC # BLD AUTO: 3.31 M/UL (ref 4.7–6.1)
RBC #/AREA URNS AUTO: 1 /HPF (ref 0–4)
RSV RNA NPH QL NAA+NON-PROBE: NOT DETECTED
RV+EV RNA NPH QL NAA+NON-PROBE: NOT DETECTED
SARS-COV-2 RNA NPH QL NAA+NON-PROBE: NOT DETECTED
SODIUM SERPL-SCNC: 139 MMOL/L (ref 136–145)
SP GR UR STRIP.AUTO: 1.01 (ref 1–1.03)
T4 FREE SERPL-MCNC: 1.12 NG/DL (ref 0.93–1.7)
TIBC SERPL-MCNC: 326 UG/DL (ref 250–400)
TSH SERPL DL<=0.005 MIU/L-ACNC: 1.38 UIU/ML (ref 0.27–4.2)
UROBILINOGEN UR STRIP.AUTO-MCNC: 0.2 E.U./DL
WBC # BLD AUTO: 11.2 K/UL (ref 4.8–10.8)
WBC #/AREA URNS AUTO: 1 /HPF (ref 0–5)

## 2025-02-21 PROCEDURE — 82728 ASSAY OF FERRITIN: CPT

## 2025-02-21 PROCEDURE — 80053 COMPREHEN METABOLIC PANEL: CPT

## 2025-02-21 PROCEDURE — 83550 IRON BINDING TEST: CPT

## 2025-02-21 PROCEDURE — 99285 EMERGENCY DEPT VISIT HI MDM: CPT

## 2025-02-21 PROCEDURE — 6370000000 HC RX 637 (ALT 250 FOR IP): Performed by: INTERNAL MEDICINE

## 2025-02-21 PROCEDURE — 83880 ASSAY OF NATRIURETIC PEPTIDE: CPT

## 2025-02-21 PROCEDURE — 83540 ASSAY OF IRON: CPT

## 2025-02-21 PROCEDURE — 85025 COMPLETE CBC W/AUTO DIFF WBC: CPT

## 2025-02-21 PROCEDURE — 84443 ASSAY THYROID STIM HORMONE: CPT

## 2025-02-21 PROCEDURE — 84145 PROCALCITONIN (PCT): CPT

## 2025-02-21 PROCEDURE — 6360000002 HC RX W HCPCS: Performed by: INTERNAL MEDICINE

## 2025-02-21 PROCEDURE — 2500000003 HC RX 250 WO HCPCS: Performed by: INTERNAL MEDICINE

## 2025-02-21 PROCEDURE — 0202U NFCT DS 22 TRGT SARS-COV-2: CPT

## 2025-02-21 PROCEDURE — 82962 GLUCOSE BLOOD TEST: CPT

## 2025-02-21 PROCEDURE — 6360000002 HC RX W HCPCS: Performed by: EMERGENCY MEDICINE

## 2025-02-21 PROCEDURE — 94640 AIRWAY INHALATION TREATMENT: CPT

## 2025-02-21 PROCEDURE — 81001 URINALYSIS AUTO W/SCOPE: CPT

## 2025-02-21 PROCEDURE — 71046 X-RAY EXAM CHEST 2 VIEWS: CPT

## 2025-02-21 PROCEDURE — 2580000003 HC RX 258: Performed by: INTERNAL MEDICINE

## 2025-02-21 PROCEDURE — 96374 THER/PROPH/DIAG INJ IV PUSH: CPT

## 2025-02-21 PROCEDURE — 1200000000 HC SEMI PRIVATE

## 2025-02-21 PROCEDURE — 84439 ASSAY OF FREE THYROXINE: CPT

## 2025-02-21 PROCEDURE — 6370000000 HC RX 637 (ALT 250 FOR IP): Performed by: STUDENT IN AN ORGANIZED HEALTH CARE EDUCATION/TRAINING PROGRAM

## 2025-02-21 PROCEDURE — 36415 COLL VENOUS BLD VENIPUNCTURE: CPT

## 2025-02-21 RX ORDER — BUMETANIDE 0.25 MG/ML
2 INJECTION, SOLUTION INTRAMUSCULAR; INTRAVENOUS 2 TIMES DAILY
Status: DISCONTINUED | OUTPATIENT
Start: 2025-02-21 | End: 2025-02-23

## 2025-02-21 RX ORDER — PREGABALIN 100 MG/1
100 CAPSULE ORAL 2 TIMES DAILY
COMMUNITY
Start: 2025-01-19

## 2025-02-21 RX ORDER — PREGABALIN 50 MG/1
100 CAPSULE ORAL 2 TIMES DAILY
Status: DISCONTINUED | OUTPATIENT
Start: 2025-02-21 | End: 2025-02-24 | Stop reason: HOSPADM

## 2025-02-21 RX ORDER — LACTULOSE 10 G/15ML
10 SOLUTION ORAL 3 TIMES DAILY
COMMUNITY

## 2025-02-21 RX ORDER — INSULIN GLARGINE 100 [IU]/ML
50 INJECTION, SOLUTION SUBCUTANEOUS NIGHTLY
Status: DISCONTINUED | OUTPATIENT
Start: 2025-02-21 | End: 2025-02-24 | Stop reason: HOSPADM

## 2025-02-21 RX ORDER — ONDANSETRON 4 MG/1
4 TABLET, ORALLY DISINTEGRATING ORAL EVERY 8 HOURS PRN
Status: DISCONTINUED | OUTPATIENT
Start: 2025-02-21 | End: 2025-02-24 | Stop reason: HOSPADM

## 2025-02-21 RX ORDER — METOPROLOL SUCCINATE 50 MG/1
1 TABLET, EXTENDED RELEASE ORAL DAILY
COMMUNITY
Start: 2025-01-14

## 2025-02-21 RX ORDER — TAMSULOSIN HYDROCHLORIDE 0.4 MG/1
1 CAPSULE ORAL DAILY
COMMUNITY
Start: 2024-10-29

## 2025-02-21 RX ORDER — TAMSULOSIN HYDROCHLORIDE 0.4 MG/1
0.4 CAPSULE ORAL DAILY
Status: DISCONTINUED | OUTPATIENT
Start: 2025-02-21 | End: 2025-02-24 | Stop reason: HOSPADM

## 2025-02-21 RX ORDER — DONEPEZIL HYDROCHLORIDE 5 MG/1
10 TABLET, FILM COATED ORAL DAILY
Status: DISCONTINUED | OUTPATIENT
Start: 2025-02-21 | End: 2025-02-24 | Stop reason: HOSPADM

## 2025-02-21 RX ORDER — OXYCODONE HYDROCHLORIDE 10 MG/1
10 TABLET ORAL EVERY 8 HOURS PRN
COMMUNITY

## 2025-02-21 RX ORDER — PANTOPRAZOLE SODIUM 40 MG/1
40 TABLET, DELAYED RELEASE ORAL DAILY
COMMUNITY
Start: 2025-01-03

## 2025-02-21 RX ORDER — INSULIN LISPRO 100 [IU]/ML
0-16 INJECTION, SOLUTION INTRAVENOUS; SUBCUTANEOUS
Status: DISCONTINUED | OUTPATIENT
Start: 2025-02-21 | End: 2025-02-24

## 2025-02-21 RX ORDER — SODIUM CHLORIDE 0.9 % (FLUSH) 0.9 %
5-40 SYRINGE (ML) INJECTION EVERY 12 HOURS SCHEDULED
Status: DISCONTINUED | OUTPATIENT
Start: 2025-02-21 | End: 2025-02-24 | Stop reason: HOSPADM

## 2025-02-21 RX ORDER — LACTULOSE 10 G/15ML
10 SOLUTION ORAL 3 TIMES DAILY
Status: DISCONTINUED | OUTPATIENT
Start: 2025-02-21 | End: 2025-02-24 | Stop reason: HOSPADM

## 2025-02-21 RX ORDER — SODIUM CHLORIDE 0.9 % (FLUSH) 0.9 %
5-40 SYRINGE (ML) INJECTION PRN
Status: DISCONTINUED | OUTPATIENT
Start: 2025-02-21 | End: 2025-02-24 | Stop reason: HOSPADM

## 2025-02-21 RX ORDER — SUCRALFATE 1 G/1
1 TABLET ORAL 4 TIMES DAILY
COMMUNITY

## 2025-02-21 RX ORDER — PANTOPRAZOLE SODIUM 40 MG/1
40 TABLET, DELAYED RELEASE ORAL DAILY
Status: DISCONTINUED | OUTPATIENT
Start: 2025-02-21 | End: 2025-02-24 | Stop reason: HOSPADM

## 2025-02-21 RX ORDER — SODIUM CHLORIDE 9 MG/ML
INJECTION, SOLUTION INTRAVENOUS PRN
Status: DISCONTINUED | OUTPATIENT
Start: 2025-02-21 | End: 2025-02-24 | Stop reason: HOSPADM

## 2025-02-21 RX ORDER — EMPAGLIFLOZIN 10 MG/1
1 TABLET, FILM COATED ORAL DAILY
COMMUNITY
Start: 2025-01-14

## 2025-02-21 RX ORDER — MECLIZINE HYDROCHLORIDE 25 MG/1
25 TABLET ORAL 3 TIMES DAILY PRN
COMMUNITY

## 2025-02-21 RX ORDER — FAMOTIDINE 20 MG/1
20 TABLET, FILM COATED ORAL 2 TIMES DAILY
Status: ON HOLD | COMMUNITY
End: 2025-02-24 | Stop reason: HOSPADM

## 2025-02-21 RX ORDER — SODIUM FERRIC GLUCONATE COMPLEX IN SUCROSE 12.5 MG/ML
125 INJECTION INTRAVENOUS DAILY
Status: DISCONTINUED | OUTPATIENT
Start: 2025-02-21 | End: 2025-02-21

## 2025-02-21 RX ORDER — ACETAMINOPHEN 325 MG/1
650 TABLET ORAL EVERY 6 HOURS PRN
Status: DISCONTINUED | OUTPATIENT
Start: 2025-02-21 | End: 2025-02-24 | Stop reason: HOSPADM

## 2025-02-21 RX ORDER — ISOSORBIDE DINITRATE 10 MG/1
30 TABLET ORAL DAILY
COMMUNITY
Start: 2025-01-13

## 2025-02-21 RX ORDER — LISINOPRIL 5 MG/1
1 TABLET ORAL DAILY
Status: ON HOLD | COMMUNITY
End: 2025-02-24 | Stop reason: HOSPADM

## 2025-02-21 RX ORDER — HYDRALAZINE HYDROCHLORIDE 10 MG/1
10 TABLET, FILM COATED ORAL EVERY 8 HOURS
Status: DISCONTINUED | OUTPATIENT
Start: 2025-02-21 | End: 2025-02-24 | Stop reason: HOSPADM

## 2025-02-21 RX ORDER — SODIUM HYPOCHLORITE 1.25 MG/ML
SOLUTION TOPICAL 2 TIMES DAILY
Status: DISCONTINUED | OUTPATIENT
Start: 2025-02-21 | End: 2025-02-24 | Stop reason: HOSPADM

## 2025-02-21 RX ORDER — METOPROLOL SUCCINATE 50 MG/1
50 TABLET, EXTENDED RELEASE ORAL DAILY
Status: DISCONTINUED | OUTPATIENT
Start: 2025-02-21 | End: 2025-02-24 | Stop reason: HOSPADM

## 2025-02-21 RX ORDER — IPRATROPIUM BROMIDE AND ALBUTEROL SULFATE 2.5; .5 MG/3ML; MG/3ML
1 SOLUTION RESPIRATORY (INHALATION) EVERY 4 HOURS
COMMUNITY
Start: 2024-12-03

## 2025-02-21 RX ORDER — HEPARIN SODIUM 5000 [USP'U]/ML
5000 INJECTION, SOLUTION INTRAVENOUS; SUBCUTANEOUS EVERY 8 HOURS SCHEDULED
Status: DISCONTINUED | OUTPATIENT
Start: 2025-02-21 | End: 2025-02-24 | Stop reason: HOSPADM

## 2025-02-21 RX ORDER — POTASSIUM CHLORIDE 1500 MG/1
1 TABLET, EXTENDED RELEASE ORAL DAILY
COMMUNITY

## 2025-02-21 RX ORDER — DONEPEZIL HYDROCHLORIDE 10 MG/1
1 TABLET, FILM COATED ORAL DAILY
COMMUNITY
Start: 2024-10-17

## 2025-02-21 RX ORDER — ROSUVASTATIN CALCIUM 10 MG/1
10 TABLET, COATED ORAL NIGHTLY
COMMUNITY
Start: 2025-01-03

## 2025-02-21 RX ORDER — INSULIN GLARGINE 100 [IU]/ML
100 INJECTION, SOLUTION SUBCUTANEOUS NIGHTLY
Status: ON HOLD | COMMUNITY
Start: 2025-01-03 | End: 2025-02-24 | Stop reason: HOSPADM

## 2025-02-21 RX ORDER — POLYETHYLENE GLYCOL 3350 17 G/17G
17 POWDER, FOR SOLUTION ORAL DAILY PRN
Status: DISCONTINUED | OUTPATIENT
Start: 2025-02-21 | End: 2025-02-24 | Stop reason: HOSPADM

## 2025-02-21 RX ORDER — IPRATROPIUM BROMIDE AND ALBUTEROL SULFATE 2.5; .5 MG/3ML; MG/3ML
1 SOLUTION RESPIRATORY (INHALATION)
Status: DISCONTINUED | OUTPATIENT
Start: 2025-02-21 | End: 2025-02-24 | Stop reason: HOSPADM

## 2025-02-21 RX ORDER — BUMETANIDE 2 MG/1
2 TABLET ORAL 2 TIMES DAILY
COMMUNITY

## 2025-02-21 RX ORDER — LISINOPRIL 5 MG/1
5 TABLET ORAL DAILY
Status: DISCONTINUED | OUTPATIENT
Start: 2025-02-21 | End: 2025-02-24 | Stop reason: HOSPADM

## 2025-02-21 RX ORDER — BUMETANIDE 0.25 MG/ML
2 INJECTION, SOLUTION INTRAMUSCULAR; INTRAVENOUS ONCE
Status: COMPLETED | OUTPATIENT
Start: 2025-02-21 | End: 2025-02-21

## 2025-02-21 RX ORDER — ONDANSETRON 2 MG/ML
4 INJECTION INTRAMUSCULAR; INTRAVENOUS EVERY 6 HOURS PRN
Status: DISCONTINUED | OUTPATIENT
Start: 2025-02-21 | End: 2025-02-24 | Stop reason: HOSPADM

## 2025-02-21 RX ORDER — INSULIN LISPRO 100 [IU]/ML
0-100 INJECTION, SOLUTION INTRAVENOUS; SUBCUTANEOUS
COMMUNITY

## 2025-02-21 RX ORDER — DULOXETIN HYDROCHLORIDE 60 MG/1
60 CAPSULE, DELAYED RELEASE ORAL DAILY
Status: ON HOLD | COMMUNITY
Start: 2025-01-19 | End: 2025-02-24 | Stop reason: HOSPADM

## 2025-02-21 RX ORDER — HYDRALAZINE HYDROCHLORIDE 10 MG/1
1 TABLET, FILM COATED ORAL EVERY 8 HOURS
COMMUNITY
Start: 2025-01-13

## 2025-02-21 RX ORDER — OXYCODONE HYDROCHLORIDE 10 MG/1
10 TABLET ORAL EVERY 8 HOURS PRN
Status: DISCONTINUED | OUTPATIENT
Start: 2025-02-21 | End: 2025-02-24 | Stop reason: HOSPADM

## 2025-02-21 RX ORDER — ACETAMINOPHEN 650 MG/1
650 SUPPOSITORY RECTAL EVERY 6 HOURS PRN
Status: DISCONTINUED | OUTPATIENT
Start: 2025-02-21 | End: 2025-02-24 | Stop reason: HOSPADM

## 2025-02-21 RX ORDER — ISOSORBIDE DINITRATE 10 MG/1
30 TABLET ORAL DAILY
Status: DISCONTINUED | OUTPATIENT
Start: 2025-02-21 | End: 2025-02-24 | Stop reason: HOSPADM

## 2025-02-21 RX ORDER — CALCITRIOL 0.25 UG/1
1 CAPSULE, LIQUID FILLED ORAL DAILY
COMMUNITY
Start: 2025-01-14

## 2025-02-21 RX ORDER — ROSUVASTATIN CALCIUM 10 MG/1
10 TABLET, COATED ORAL NIGHTLY
Status: DISCONTINUED | OUTPATIENT
Start: 2025-02-21 | End: 2025-02-24 | Stop reason: HOSPADM

## 2025-02-21 RX ADMIN — HYDRALAZINE HYDROCHLORIDE 10 MG: 10 TABLET ORAL at 20:36

## 2025-02-21 RX ADMIN — INSULIN LISPRO 8 UNITS: 100 INJECTION, SOLUTION INTRAVENOUS; SUBCUTANEOUS at 08:14

## 2025-02-21 RX ADMIN — IPRATROPIUM BROMIDE AND ALBUTEROL SULFATE 1 DOSE: 2.5; .5 SOLUTION RESPIRATORY (INHALATION) at 14:35

## 2025-02-21 RX ADMIN — IPRATROPIUM BROMIDE AND ALBUTEROL SULFATE 1 DOSE: 2.5; .5 SOLUTION RESPIRATORY (INHALATION) at 07:09

## 2025-02-21 RX ADMIN — LACTULOSE 10 G: 20 SOLUTION ORAL at 14:38

## 2025-02-21 RX ADMIN — DONEPEZIL HYDROCHLORIDE 10 MG: 5 TABLET, FILM COATED ORAL at 07:56

## 2025-02-21 RX ADMIN — HEPARIN SODIUM 5000 UNITS: 5000 INJECTION INTRAVENOUS; SUBCUTANEOUS at 06:33

## 2025-02-21 RX ADMIN — PANTOPRAZOLE SODIUM 40 MG: 40 TABLET, DELAYED RELEASE ORAL at 07:56

## 2025-02-21 RX ADMIN — DAKIN'S SOLUTION 0.125% (QUARTER STRENGTH): 0.12 SOLUTION at 22:07

## 2025-02-21 RX ADMIN — HEPARIN SODIUM 5000 UNITS: 5000 INJECTION INTRAVENOUS; SUBCUTANEOUS at 20:33

## 2025-02-21 RX ADMIN — LACTULOSE 10 G: 20 SOLUTION ORAL at 07:56

## 2025-02-21 RX ADMIN — BUMETANIDE 2 MG: 0.25 INJECTION INTRAMUSCULAR; INTRAVENOUS at 01:38

## 2025-02-21 RX ADMIN — SODIUM CHLORIDE, PRESERVATIVE FREE 10 ML: 5 INJECTION INTRAVENOUS at 07:58

## 2025-02-21 RX ADMIN — LACTULOSE 10 G: 20 SOLUTION ORAL at 20:33

## 2025-02-21 RX ADMIN — PREGABALIN 100 MG: 50 CAPSULE ORAL at 20:33

## 2025-02-21 RX ADMIN — ROSUVASTATIN 10 MG: 10 TABLET, FILM COATED ORAL at 20:34

## 2025-02-21 RX ADMIN — PREGABALIN 100 MG: 50 CAPSULE ORAL at 07:55

## 2025-02-21 RX ADMIN — IPRATROPIUM BROMIDE AND ALBUTEROL SULFATE 1 DOSE: 2.5; .5 SOLUTION RESPIRATORY (INHALATION) at 19:25

## 2025-02-21 RX ADMIN — TAMSULOSIN HYDROCHLORIDE 0.4 MG: 0.4 CAPSULE ORAL at 07:56

## 2025-02-21 RX ADMIN — OXYCODONE HYDROCHLORIDE 10 MG: 10 TABLET ORAL at 20:36

## 2025-02-21 RX ADMIN — IPRATROPIUM BROMIDE AND ALBUTEROL SULFATE 1 DOSE: 2.5; .5 SOLUTION RESPIRATORY (INHALATION) at 11:19

## 2025-02-21 RX ADMIN — BUMETANIDE 2 MG: 0.25 INJECTION INTRAMUSCULAR; INTRAVENOUS at 17:22

## 2025-02-21 RX ADMIN — METOPROLOL SUCCINATE 50 MG: 50 TABLET, EXTENDED RELEASE ORAL at 07:56

## 2025-02-21 RX ADMIN — HEPARIN SODIUM 5000 UNITS: 5000 INJECTION INTRAVENOUS; SUBCUTANEOUS at 14:38

## 2025-02-21 RX ADMIN — ISOSORBIDE DINITRATE 30 MG: 10 TABLET ORAL at 07:56

## 2025-02-21 RX ADMIN — BUMETANIDE 2 MG: 0.25 INJECTION INTRAMUSCULAR; INTRAVENOUS at 07:55

## 2025-02-21 RX ADMIN — SODIUM CHLORIDE 125 MG: 9 INJECTION, SOLUTION INTRAVENOUS at 11:09

## 2025-02-21 RX ADMIN — INSULIN GLARGINE 50 UNITS: 100 INJECTION, SOLUTION SUBCUTANEOUS at 20:34

## 2025-02-21 RX ADMIN — INSULIN LISPRO 4 UNITS: 100 INJECTION, SOLUTION INTRAVENOUS; SUBCUTANEOUS at 20:33

## 2025-02-21 RX ADMIN — HYDRALAZINE HYDROCHLORIDE 10 MG: 10 TABLET ORAL at 06:32

## 2025-02-21 RX ADMIN — SODIUM CHLORIDE, PRESERVATIVE FREE 10 ML: 5 INJECTION INTRAVENOUS at 20:44

## 2025-02-21 RX ADMIN — HYDRALAZINE HYDROCHLORIDE 10 MG: 10 TABLET ORAL at 14:39

## 2025-02-21 ASSESSMENT — PAIN DESCRIPTION - ORIENTATION: ORIENTATION: RIGHT;LEFT

## 2025-02-21 ASSESSMENT — PAIN SCALES - GENERAL
PAINLEVEL_OUTOF10: 7
PAINLEVEL_OUTOF10: 5

## 2025-02-21 ASSESSMENT — PAIN DESCRIPTION - LOCATION: LOCATION: NECK

## 2025-02-21 ASSESSMENT — PAIN DESCRIPTION - DESCRIPTORS: DESCRIPTORS: ACHING

## 2025-02-21 NOTE — PLAN OF CARE
AM admit on 2/21/2025 for c/o shortness of breath in the setting of CHF with stage IV kidney disease.  Will continue current POC and follow

## 2025-02-21 NOTE — PROGRESS NOTES
4 Eyes Skin Assessment     NAME:  Cholo Bhakta  YOB: 1955  MEDICAL RECORD NUMBER:  [id number]    The patient is being assessed for  Admission  PATIENT WAS IN CDU-ADMITTED AT 0100 ON 2/21/2025  I agree that at least one RN has performed a thorough Head to Toe Skin Assessment on the patient. ALL assessment sites listed below have been assessed.      Areas assessed by both nurses:    Head, Face, Ears, Shoulders, Back, Chest, Arms, Elbows, Hands, Sacrum. Buttock, Coccyx, Ischium, and Legs. Feet and Heels        Does the Patient have a Wound? YES       George Prevention initiated by RN: Yes  Wound Care Orders initiated by RN: Yes    Pressure Injury (Stage 3,4, Unstageable, DTI, NWPT, and Complex wounds) if present, place Wound referral order by RN under : Yes    New Ostomies, if present place, Ostomy referral order under : No     Nurse 1 eSignature: Electronically signed by Sarah Beth Mckay RN on 2/21/25 at 4:59 PM CST    **SHARE this note so that the co-signing nurse can place an eSignature**    Nurse 2 eSignature: Electronically signed by BABITA MEJIA LPN on 2/21/25 at 7:20 PM CST

## 2025-02-21 NOTE — PROGRESS NOTES
Cholo Bhakta arrived to room # 325.   Presented with: shortness of breath  Mental Status: Patient is oriented, alert, and coherent.   Vitals:    02/21/25 1604   BP: (!) 109/48   Pulse: 63   Resp: 18   Temp: 97 °F (36.1 °C)   SpO2: 98%     Patient safety contract and falls prevention contract reviewed with patient Yes.  Oriented Patient to room.  Call light within reach. Yes.  Needs, issues or concerns expressed at this time: no.      Electronically signed by Sarah Beth Mckay RN on 2/21/2025 at 4:57 PM

## 2025-02-21 NOTE — ED PROVIDER NOTES
[facility] EMERGENCY DEPARTMENT  EMERGENCY DEPARTMENT ENCOUNTER      Pt Name: Cholo Bhakta  MRN: [id number]  Birthdate 1955  Date of evaluation: 2/21/2025  Provider: Robinson Cooper Jr, MD    CHIEF COMPLAINT       Chief Complaint   Patient presents with    Shortness of Breath     Dyspnea with exertion x 2 days          HISTORY OF PRESENT ILLNESS   (Location/Symptom, Timing/Onset,Context/Setting, Quality, Duration, Modifying Factors, Severity)  Note limiting factors.   Cholo Bhakta is a 69 y.o. male who presents to the emergency department for evaluation after having shortness of breath that has been present and gradually worsening over the last couple of weeks.  Has history of heart failure as well as CKD.  Has been on Bumex.  Was on 1 mg twice daily dose until last week when it was increased to 2 mg twice daily dosing.  Has not had any segment improvement since then.  Has been seen by nephrology and his PCP during that time and was advised he may need hospitalization for IV diuresis if he does not have significant improvement    Patient also has wound to left heel that he says has been present for a long time and actually is better now than has been in the past.  Denies any acute worsening or concern related to the wound    Initially, outside medical records were not available in Care Everywhere.  Later in the visit after record was linked, was able to review outside medical records.  Patient had an echo on 1/8/2025 that showed EF 36 to 40%.  Most recent chemistry shows BUN 50 with creatinine 2.62 and GFR of 25.8  Most recent BNP 5180    HPI    NursingNotes were reviewed.    REVIEW OF SYSTEMS    (2-9 systems for level 4, 10 or more for level 5)     Review of Systems   Constitutional: Negative.    HENT: Negative.     Eyes: Negative.    Respiratory:  Positive for shortness of breath.    Cardiovascular:  Positive for leg swelling.   Gastrointestinal: Negative.    Genitourinary: Negative.    Musculoskeletal: Negative.

## 2025-02-21 NOTE — CONSULTS
Renal Consult Note    Thank you to requesting provider: Dr Prado, for asking us to see Cholo Bhakta    Reason for consultation:  SANGEETA, CKD    Chief Complaint:  Dyspnea.    History of Presenting Illness      Patient is a 69-year-old a pleasant man with a past medical history of type 2 diabetes, morbid obesity, CHF, chronic disease stage IV, recurrent diabetic feet ulcers and leg edema.  He has had a frequent hospital admission for CHF exacerbation and was managed with diuretics.  His kidney function also fluctuate a lot.  He is followed at the renal clinic by MAURICIO Chaparro.  Patient currently presents to the ER with increasing leg edema up to his mid abdomen as well as increasing shortness of breath.  He denies fever, chills, nausea vomiting and chest pain.  He noticed some drop in his urine output.  Patient is now managed with diuretics and he noticed some improvement in his urine output.  He also noticed some improvement in his respiratory status.    Past Medical/Surgical History      Active Ambulatory Problems     Diagnosis Date Noted    No Active Ambulatory Problems     Resolved Ambulatory Problems     Diagnosis Date Noted    No Resolved Ambulatory Problems     No Additional Past Medical History         Review of Systems     Constitutional:  No weight loss, no fever/chills  Eyes:  No eye pain, no eye redness  Cardiovascular:  No chest pain, + worsening of edema  Respiratory:  No hemoptysis, no stidor  Gastrointestinal:  No blood in stool, no n/v, no diarrhea  Genitoruinary:  No hematuria, no difficulty with urination  Musculoskeletal:  No joint swelling, no redness  Integumentary:  No Rash, no itching  Neurological:  No focal weakness, No new sensory deficit  Psychiatric:  No depression, no confusion  Endocrine:  No polyuria, no polydipsia       Medications        Current Facility-Administered Medications:     donepezil (ARICEPT) tablet 10 mg, 10 mg, Oral, Daily, Joseph Prado MD, 10 mg at 02/21/25

## 2025-02-21 NOTE — PROGRESS NOTES
ED TO INPATIENT SBAR HANDOFF    Patient Name: Cholo Bhakta   : 1955  69 y.o.   Family/Caregiver Present: No  Code Status Order: Full Code    C-SSRS: Risk of Suicide: No Risk  Sitter No  Restraints:         Situation  Chief Complaint:   Chief Complaint   Patient presents with    Shortness of Breath     Dyspnea with exertion x 2 days      Patient Diagnosis: CHF, left ventricular (HCC) [I50.9]     Brief Description of Patient's Condition: pt c/o soa x 1 week and increasing ble edema. BNP elevated on arrival at 8k. Bumex 2mg ivp initiated. Pt has hx of ckd stg4. Cxr showed bilateral pleural effusions with pulm edema. Pt is alert and oriented and up with standby assist.   Mental Status: oriented, alert, coherent, logical, thought processes intact, and able to concentrate and follow conversation  Arrived from: home    Imaging:   XR CHEST (2 VW)   Final Result   Impression:  Pulmonary edema with bilateral pleural effusions           ______________________________________    Electronically signed by: NGUYEN BURKETT M.D.   Date:     2025   Time:    04:32         COVID-19 Results:   Internal Administration   First Dose      Second Dose           Last COVID Lab SARS-CoV-2, PCR (no units)   Date Value   2025 Not Detected           Abnormal labs:   Abnormal Labs Reviewed   CBC WITH AUTO DIFFERENTIAL - Abnormal; Notable for the following components:       Result Value    WBC 11.2 (*)     RBC 3.31 (*)     Hemoglobin 9.6 (*)     Hematocrit 31.2 (*)     MCV 94.3 (*)     MCHC 30.8 (*)     RDW 15.6 (*)     Neutrophils % 76.1 (*)     Lymphocytes % 9.5 (*)     Neutrophils Absolute 8.5 (*)     Monocytes Absolute 1.10 (*)     All other components within normal limits   COMPREHENSIVE METABOLIC PANEL - Abnormal; Notable for the following components:    CO2 19 (*)     Anion Gap 20 (*)     Glucose 275 (*)     BUN 75 (*)     Creatinine 3.3 (*)     Est, Glom Filt Rate 19 (*)     Alkaline Phosphatase 130 (*)     All other

## 2025-02-21 NOTE — H&P
[facility] Medicine H&P    Patient:  Cholo Bhakta  MRN: [id number]    Consulting Physician: Joseph Prado MD  Reason for Consult: Medical Management  Primacy Care Physician: No primary care provider on file.    HISTORY OF PRESENT ILLNESS:   The patient is a 69 y.o. male past medical history of congestive heart failure, diabetes mellitus who presented to the emergency department for evaluation of shortness of breath x 1 week.  He also endorses increased bilateral lower extremity edema going up into his lower abdomen.  He denies any other fevers, chills, sweats, nausea, vomiting, chest pain, palpitations, abdominal pain, diarrhea constipation, dysuria, weakness, rash.  Patient does have CKD stage IV and follows with nephrology who assist with volume management and previous hospitalizations.  Patient to be admitted for ongoing monitoring and management.    Past Medical History:  History reviewed. No pertinent past medical history.  Congestive heart failure, diabetes mellitus, hypertension, CKD stage IV  Past Surgical History:  History reviewed. No pertinent surgical history.    Medications: Scheduled Meds:  Continuous Infusions:  PRN Meds:.    Allergies:  Bactrim [sulfamethoxazole-trimethoprim]    Social History:   TOBACCO:   reports that he has never smoked. He has never used smokeless tobacco.  ETOH:   reports that he does not currently use alcohol.      Family History:   History reviewed. No pertinent family history.    ROS: Lower extremity swelling and shortness of breath    Physical Exam:    Vitals: BP (!) 149/83   Pulse 80   Temp 97.6 °F (36.4 °C) (Oral)   Resp 19   Ht 1.829 m (6')   Wt 124.7 kg (275 lb)   SpO2 98%   BMI 37.30 kg/m²   General appearance: alert, appears stated age and cooperative  Skin: Skin color, texture, turgor normal. No rashes or lesions  HEENT: Head: Normocephalic, no lesions, without obvious abnormality.  Neck: no adenopathy, no carotid bruit, no JVD, supple, symmetrical, trachea

## 2025-02-22 PROBLEM — E11.9 DIABETES MELLITUS (HCC): Status: ACTIVE | Noted: 2025-02-22

## 2025-02-22 PROBLEM — E11.621 DIABETIC ULCER OF LEFT FOOT ASSOCIATED WITH TYPE 2 DIABETES MELLITUS (HCC): Status: ACTIVE | Noted: 2020-08-31

## 2025-02-22 PROBLEM — I10 ESSENTIAL HYPERTENSION: Status: ACTIVE | Noted: 2017-05-03

## 2025-02-22 PROBLEM — L97.529 DIABETIC ULCER OF LEFT FOOT ASSOCIATED WITH TYPE 2 DIABETES MELLITUS (HCC): Status: ACTIVE | Noted: 2020-08-31

## 2025-02-22 PROBLEM — J44.9 CHRONIC OBSTRUCTIVE PULMONARY DISEASE (HCC): Status: ACTIVE | Noted: 2024-12-03

## 2025-02-22 PROBLEM — I25.10 CORONARY ATHEROSCLEROSIS: Status: ACTIVE | Noted: 2023-01-20

## 2025-02-22 LAB
ALBUMIN SERPL-MCNC: 3.6 G/DL (ref 3.5–5.2)
ALP SERPL-CCNC: 108 U/L (ref 40–129)
ALT SERPL-CCNC: 15 U/L (ref 5–41)
ANION GAP SERPL CALCULATED.3IONS-SCNC: 13 MMOL/L (ref 8–16)
AST SERPL-CCNC: 14 U/L (ref 5–40)
BASOPHILS # BLD: 0.1 K/UL (ref 0–0.2)
BASOPHILS NFR BLD: 1 % (ref 0–1)
BILIRUB DIRECT SERPL-MCNC: 0.2 MG/DL (ref 0–0.3)
BILIRUB INDIRECT SERPL-MCNC: 0.3 MG/DL (ref 0–1)
BILIRUB SERPL-MCNC: 0.5 MG/DL (ref 0.2–1.2)
BUN SERPL-MCNC: 76 MG/DL (ref 8–23)
CALCIUM SERPL-MCNC: 8.8 MG/DL (ref 8.8–10.2)
CHLORIDE SERPL-SCNC: 103 MMOL/L (ref 98–107)
CHOLEST SERPL-MCNC: 163 MG/DL (ref 0–199)
CK SERPL-CCNC: 99 U/L (ref 39–308)
CO2 SERPL-SCNC: 24 MMOL/L (ref 22–29)
CREAT SERPL-MCNC: 3.1 MG/DL (ref 0.7–1.2)
EOSINOPHIL # BLD: 0.5 K/UL (ref 0–0.6)
EOSINOPHIL NFR BLD: 6.9 % (ref 0–5)
ERYTHROCYTE [DISTWIDTH] IN BLOOD BY AUTOMATED COUNT: 15.1 % (ref 11.5–14.5)
GLUCOSE BLD-MCNC: 155 MG/DL (ref 70–99)
GLUCOSE BLD-MCNC: 274 MG/DL (ref 70–99)
GLUCOSE BLD-MCNC: 299 MG/DL (ref 70–99)
GLUCOSE BLD-MCNC: 95 MG/DL (ref 70–99)
GLUCOSE SERPL-MCNC: 92 MG/DL (ref 70–99)
HCT VFR BLD AUTO: 29 % (ref 42–52)
HDLC SERPL-MCNC: 62 MG/DL (ref 40–60)
HGB BLD-MCNC: 9 G/DL (ref 14–18)
IMM GRANULOCYTES # BLD: 0 K/UL
LDLC SERPL CALC-MCNC: 86 MG/DL
LYMPHOCYTES # BLD: 1 K/UL (ref 1.1–4.5)
LYMPHOCYTES NFR BLD: 14.8 % (ref 20–40)
MAGNESIUM SERPL-MCNC: 2.4 MG/DL (ref 1.6–2.4)
MCH RBC QN AUTO: 28.8 PG (ref 27–31)
MCHC RBC AUTO-ENTMCNC: 31 G/DL (ref 33–37)
MCV RBC AUTO: 92.7 FL (ref 80–94)
MONOCYTES # BLD: 0.8 K/UL (ref 0–0.9)
MONOCYTES NFR BLD: 11.9 % (ref 0–10)
NEUTROPHILS # BLD: 4.5 K/UL (ref 1.5–7.5)
NEUTS SEG NFR BLD: 65.3 % (ref 50–65)
PERFORMED ON: ABNORMAL
PERFORMED ON: NORMAL
PLATELET # BLD AUTO: 241 K/UL (ref 130–400)
PMV BLD AUTO: 11.5 FL (ref 9.4–12.4)
POTASSIUM SERPL-SCNC: 4.1 MMOL/L (ref 3.5–5.1)
PROT SERPL-MCNC: 6.5 G/DL (ref 6.4–8.3)
PTH-INTACT SERPL-MCNC: 94.5 PG/ML (ref 15–65)
RBC # BLD AUTO: 3.13 M/UL (ref 4.7–6.1)
SODIUM SERPL-SCNC: 140 MMOL/L (ref 136–145)
TRIGL SERPL-MCNC: 74 MG/DL (ref 0–149)
URATE SERPL-MCNC: 9.6 MG/DL (ref 3.4–7)
WBC # BLD AUTO: 7 K/UL (ref 4.8–10.8)

## 2025-02-22 PROCEDURE — 1200000000 HC SEMI PRIVATE

## 2025-02-22 PROCEDURE — 94640 AIRWAY INHALATION TREATMENT: CPT

## 2025-02-22 PROCEDURE — 83735 ASSAY OF MAGNESIUM: CPT

## 2025-02-22 PROCEDURE — 2580000003 HC RX 258: Performed by: INTERNAL MEDICINE

## 2025-02-22 PROCEDURE — 83970 ASSAY OF PARATHORMONE: CPT

## 2025-02-22 PROCEDURE — 36415 COLL VENOUS BLD VENIPUNCTURE: CPT

## 2025-02-22 PROCEDURE — 84550 ASSAY OF BLOOD/URIC ACID: CPT

## 2025-02-22 PROCEDURE — 85025 COMPLETE CBC W/AUTO DIFF WBC: CPT

## 2025-02-22 PROCEDURE — 6370000000 HC RX 637 (ALT 250 FOR IP): Performed by: INTERNAL MEDICINE

## 2025-02-22 PROCEDURE — 6360000002 HC RX W HCPCS: Performed by: INTERNAL MEDICINE

## 2025-02-22 PROCEDURE — 2500000003 HC RX 250 WO HCPCS: Performed by: INTERNAL MEDICINE

## 2025-02-22 PROCEDURE — 82550 ASSAY OF CK (CPK): CPT

## 2025-02-22 PROCEDURE — 94760 N-INVAS EAR/PLS OXIMETRY 1: CPT

## 2025-02-22 PROCEDURE — 80048 BASIC METABOLIC PNL TOTAL CA: CPT

## 2025-02-22 PROCEDURE — 80076 HEPATIC FUNCTION PANEL: CPT

## 2025-02-22 PROCEDURE — 80061 LIPID PANEL: CPT

## 2025-02-22 PROCEDURE — 82962 GLUCOSE BLOOD TEST: CPT

## 2025-02-22 RX ORDER — ALLOPURINOL 100 MG/1
200 TABLET ORAL DAILY
Status: DISCONTINUED | OUTPATIENT
Start: 2025-02-22 | End: 2025-02-24 | Stop reason: HOSPADM

## 2025-02-22 RX ADMIN — PREGABALIN 100 MG: 50 CAPSULE ORAL at 21:59

## 2025-02-22 RX ADMIN — DAKIN'S SOLUTION 0.125% (QUARTER STRENGTH): 0.12 SOLUTION at 16:21

## 2025-02-22 RX ADMIN — SODIUM CHLORIDE, PRESERVATIVE FREE 10 ML: 5 INJECTION INTRAVENOUS at 08:17

## 2025-02-22 RX ADMIN — IPRATROPIUM BROMIDE AND ALBUTEROL SULFATE 1 DOSE: 2.5; .5 SOLUTION RESPIRATORY (INHALATION) at 19:15

## 2025-02-22 RX ADMIN — ROSUVASTATIN 10 MG: 10 TABLET, FILM COATED ORAL at 21:59

## 2025-02-22 RX ADMIN — BUMETANIDE 2 MG: 0.25 INJECTION INTRAMUSCULAR; INTRAVENOUS at 08:08

## 2025-02-22 RX ADMIN — HYDRALAZINE HYDROCHLORIDE 10 MG: 10 TABLET ORAL at 05:27

## 2025-02-22 RX ADMIN — HYDRALAZINE HYDROCHLORIDE 10 MG: 10 TABLET ORAL at 14:47

## 2025-02-22 RX ADMIN — IPRATROPIUM BROMIDE AND ALBUTEROL SULFATE 1 DOSE: 2.5; .5 SOLUTION RESPIRATORY (INHALATION) at 06:57

## 2025-02-22 RX ADMIN — PREGABALIN 100 MG: 50 CAPSULE ORAL at 08:09

## 2025-02-22 RX ADMIN — DONEPEZIL HYDROCHLORIDE 10 MG: 5 TABLET, FILM COATED ORAL at 08:09

## 2025-02-22 RX ADMIN — INSULIN LISPRO 8 UNITS: 100 INJECTION, SOLUTION INTRAVENOUS; SUBCUTANEOUS at 22:00

## 2025-02-22 RX ADMIN — OXYCODONE HYDROCHLORIDE 10 MG: 10 TABLET ORAL at 21:59

## 2025-02-22 RX ADMIN — IPRATROPIUM BROMIDE AND ALBUTEROL SULFATE 1 DOSE: 2.5; .5 SOLUTION RESPIRATORY (INHALATION) at 11:30

## 2025-02-22 RX ADMIN — LACTULOSE 10 G: 20 SOLUTION ORAL at 14:47

## 2025-02-22 RX ADMIN — PANTOPRAZOLE SODIUM 40 MG: 40 TABLET, DELAYED RELEASE ORAL at 08:09

## 2025-02-22 RX ADMIN — INSULIN GLARGINE 50 UNITS: 100 INJECTION, SOLUTION SUBCUTANEOUS at 22:00

## 2025-02-22 RX ADMIN — SODIUM CHLORIDE 125 MG: 9 INJECTION, SOLUTION INTRAVENOUS at 08:16

## 2025-02-22 RX ADMIN — IPRATROPIUM BROMIDE AND ALBUTEROL SULFATE 1 DOSE: 2.5; .5 SOLUTION RESPIRATORY (INHALATION) at 14:33

## 2025-02-22 RX ADMIN — DAKIN'S SOLUTION 0.125% (QUARTER STRENGTH): 0.12 SOLUTION at 22:08

## 2025-02-22 RX ADMIN — HEPARIN SODIUM 5000 UNITS: 5000 INJECTION INTRAVENOUS; SUBCUTANEOUS at 14:47

## 2025-02-22 RX ADMIN — HEPARIN SODIUM 5000 UNITS: 5000 INJECTION INTRAVENOUS; SUBCUTANEOUS at 05:27

## 2025-02-22 RX ADMIN — HYDRALAZINE HYDROCHLORIDE 10 MG: 10 TABLET ORAL at 21:59

## 2025-02-22 RX ADMIN — HEPARIN SODIUM 5000 UNITS: 5000 INJECTION INTRAVENOUS; SUBCUTANEOUS at 22:00

## 2025-02-22 RX ADMIN — TAMSULOSIN HYDROCHLORIDE 0.4 MG: 0.4 CAPSULE ORAL at 08:09

## 2025-02-22 RX ADMIN — LACTULOSE 10 G: 20 SOLUTION ORAL at 08:08

## 2025-02-22 RX ADMIN — ISOSORBIDE DINITRATE 30 MG: 10 TABLET ORAL at 08:09

## 2025-02-22 RX ADMIN — ALLOPURINOL 200 MG: 100 TABLET ORAL at 14:47

## 2025-02-22 RX ADMIN — SODIUM CHLORIDE, PRESERVATIVE FREE 10 ML: 5 INJECTION INTRAVENOUS at 22:00

## 2025-02-22 RX ADMIN — LACTULOSE 10 G: 20 SOLUTION ORAL at 22:00

## 2025-02-22 RX ADMIN — METOPROLOL SUCCINATE 50 MG: 50 TABLET, EXTENDED RELEASE ORAL at 08:09

## 2025-02-22 ASSESSMENT — PAIN DESCRIPTION - DESCRIPTORS: DESCRIPTORS: ACHING

## 2025-02-22 ASSESSMENT — PAIN DESCRIPTION - LOCATION: LOCATION: NECK

## 2025-02-22 ASSESSMENT — PAIN SCALES - GENERAL
PAINLEVEL_OUTOF10: 7
PAINLEVEL_OUTOF10: 4

## 2025-02-22 ASSESSMENT — PAIN DESCRIPTION - ORIENTATION: ORIENTATION: RIGHT;LEFT

## 2025-02-22 NOTE — PROGRESS NOTES
[facility]      Patient:  Cholo Bhakta  YOB: 1955  Date of Service: 2/22/2025  MRN: [id number]   Acct: [id number]   Primary Care Physician: Nirmal Nuno MD  Advance Directive: Full Code  Admit Date: 2/21/2025       Hospital Day: 1  Portions of this note have been copied forward, however, changed to reflect the most current clinical status of this patient.    CHIEF COMPLAINT Shortness of breath    SUBJECTIVE:  He remains on RA. He continues to have shortness of breath, but states that it has improved since admission. UOP 1550 ml. VSS. No issues or events overnight.     CUMULATIVE HOSPITAL STAY:  The patient is a 70 yo male with a PMH of HTN, CAD, HFrEF, CKD stage IV, type 2 diabetes, and diabetic foot wound who presented to [facility] ED on 2/21/2025 with c/o shortness of breath. He stated that it had worsened over the last 2 weeks. He had been taking bumex, which has recently been increased, however, he had seen little improvement in his symptoms.  He additionally reported increased bilateral lower extremity edema going up to his lower abdomen.  He was seen by both his PCP and nephrologist who advised that he may need hospitalization for IV diuresis if he did not improve.  He additionally reported a chronic diabetic foot wound that was improving.  ED workup revealed-, CO2 19, BUN 75 and creatinine 3.3 (baseline 2.7-2.9 GFR-25.8) glucose 275.  proBNP 8096.  WBC 11.2, H&H 9.6/32.1 with a platelet count of 214.  RPP negative for viral illness.  UA negative for infection.  Chest x-ray pulmonary edema with bilateral pleural effusions.  The patient was admitted to hospital medicine with a nephrology consult.    He was placed on Bumex 2 mg IV twice daily.  Nephrology agrees with diuresis no need for renal replacement at this time.  Urine output 1550 ml.  Renal functions remain relatively unchanged BUN 76 and creatinine 3.1.     Hgb-9.0-iron panel-iron 28, TIBC-326, iron sat-9-he will receive IV

## 2025-02-22 NOTE — PLAN OF CARE
Problem: Safety - Adult  Goal: Free from fall injury  Outcome: Progressing     Problem: Chronic Conditions and Co-morbidities  Goal: Patient's chronic conditions and co-morbidity symptoms are monitored and maintained or improved  Outcome: Progressing  Flowsheets  Taken 2/21/2025 2036 by Lea Saunders LPN  Care Plan - Patient's Chronic Conditions and Co-Morbidity Symptoms are Monitored and Maintained or Improved: Monitor and assess patient's chronic conditions and comorbid symptoms for stability, deterioration, or improvement  Taken 2/21/2025 1659 by Sarah Beth Mckay, RN  Care Plan - Patient's Chronic Conditions and Co-Morbidity Symptoms are Monitored and Maintained or Improved:   Monitor and assess patient's chronic conditions and comorbid symptoms for stability, deterioration, or improvement   Collaborate with multidisciplinary team to address chronic and comorbid conditions and prevent exacerbation or deterioration   Update acute care plan with appropriate goals if chronic or comorbid symptoms are exacerbated and prevent overall improvement and discharge

## 2025-02-22 NOTE — PROGRESS NOTES
Renal progress Note    Thank you to requesting provider: Dr Prado, for asking us to see Cholo Livia    Reason for consultation:  SANGEETA, CKD    Chief Complaint:  Dyspnea.    History of Presenting Illness      Patient is a 69-year-old a pleasant man with a past medical history of type 2 diabetes, morbid obesity, CHF, chronic disease stage IV, recurrent diabetic feet ulcers and leg edema.  He has had a frequent hospital admission for CHF exacerbation and was managed with diuretics.  His kidney function also fluctuate a lot.  He is followed at the renal clinic by MAURICIO Chaparro.  Patient currently presents to the ER with increasing leg edema up to his mid abdomen as well as increasing shortness of breath.  He denies fever, chills, nausea vomiting and chest pain.  He noticed some drop in his urine output.  Patient is now managed with diuretics and he noticed some improvement in his urine output.  He also noticed some improvement in his respiratory status.  Today, patient denies much dyspnea.  He started to feel little stronger.  Has good urine output.    Past Medical/Surgical History      Active Ambulatory Problems     Diagnosis Date Noted    No Active Ambulatory Problems     Resolved Ambulatory Problems     Diagnosis Date Noted    No Resolved Ambulatory Problems     No Additional Past Medical History         Review of Systems     Constitutional:  No weight loss, no fever/chills  Eyes:  No eye pain, no eye redness  Cardiovascular:  No chest pain, + worsening of edema  Respiratory:  No hemoptysis, no stidor  Gastrointestinal:  No blood in stool, no n/v, no diarrhea  Genitoruinary:  No hematuria, no difficulty with urination  Musculoskeletal:  No joint swelling, no redness  Integumentary:  No Rash, no itching  Neurological:  No focal weakness, No new sensory deficit  Psychiatric:  No depression, no confusion  Endocrine:  No polyuria, no polydipsia       Medications        Current Facility-Administered Medications:

## 2025-02-22 NOTE — CARE COORDINATION
Case Management Assessment  Initial Evaluation    Date/Time of Evaluation: 2/22/2025 3:17 PM  Assessment Completed by: Karen Corona    If patient is discharged prior to next notation, then this note serves as note for discharge by case management.    Patient Name: Cholo Bhakta                   YOB: 1955  Diagnosis: Acute on chronic renal insufficiency [N28.9, N18.9]  CHF, left ventricular (HCC) [I50.9]  Diabetic ulcer of left heel associated with type 2 diabetes mellitus, unspecified ulcer stage (HCC) [E11.621, L97.429]  Acute on chronic congestive heart failure, unspecified heart failure type (HCC) [I50.9]                   Date / Time: 2/21/2025 12:31 AM    Patient Admission Status: Inpatient   Readmission Risk (Low < 19, Mod (19-27), High > 27): Readmission Risk Score: 21.3    Current PCP: Nirmal Nuno MD  PCP verified by CM? (P) Yes    Chart Reviewed: Yes      History Provided by: (P) Patient  Patient Orientation: (P) Alert and Oriented    Patient Cognition: (P) Alert    Hospitalization in the last 30 days (Readmission):  No    If yes, Readmission Assessment in  Navigator will be completed.    Advance Directives:      Code Status: Full Code   Patient's Primary Decision Maker is: (P) Legal Next of Kin      Discharge Planning:    Patient lives with: Spouse/Significant Other Type of Home: House  Primary Care Giver: (P) Self  Patient Support Systems include: Spouse/Significant Other, Children, Family Members, Friends/Neighbors   Current Financial resources: (P) Medicare  Current community resources: (P) None  Current services prior to admission: None            Current DME:              Type of Home Care services:  Nursing Services    ADLS  Prior functional level: (P) Independent in ADLs/IADLs  Current functional level: (P) Assistance with the following:, Mobility, Shopping, Housework, Cooking    PT AM-PAC:   /24  OT AM-PAC:   /24    Family can provide assistance at DC: (P) Yes  Would you

## 2025-02-23 LAB
ANION GAP SERPL CALCULATED.3IONS-SCNC: 16 MMOL/L (ref 8–16)
BASOPHILS # BLD: 0.1 K/UL (ref 0–0.2)
BASOPHILS NFR BLD: 1.2 % (ref 0–1)
BNP BLD-MCNC: 6740 PG/ML (ref 0–124)
BUN SERPL-MCNC: 74 MG/DL (ref 8–23)
CALCIUM SERPL-MCNC: 8.9 MG/DL (ref 8.8–10.2)
CHLORIDE SERPL-SCNC: 105 MMOL/L (ref 98–107)
CO2 SERPL-SCNC: 23 MMOL/L (ref 22–29)
CREAT SERPL-MCNC: 3 MG/DL (ref 0.7–1.2)
EOSINOPHIL # BLD: 0.6 K/UL (ref 0–0.6)
EOSINOPHIL NFR BLD: 8.1 % (ref 0–5)
ERYTHROCYTE [DISTWIDTH] IN BLOOD BY AUTOMATED COUNT: 14.9 % (ref 11.5–14.5)
GLUCOSE BLD-MCNC: 136 MG/DL (ref 70–99)
GLUCOSE BLD-MCNC: 382 MG/DL (ref 70–99)
GLUCOSE BLD-MCNC: 85 MG/DL (ref 70–99)
GLUCOSE SERPL-MCNC: 120 MG/DL (ref 70–99)
HCT VFR BLD AUTO: 28.3 % (ref 42–52)
HGB BLD-MCNC: 8.5 G/DL (ref 14–18)
IMM GRANULOCYTES # BLD: 0 K/UL
LYMPHOCYTES # BLD: 1.2 K/UL (ref 1.1–4.5)
LYMPHOCYTES NFR BLD: 17.9 % (ref 20–40)
MAGNESIUM SERPL-MCNC: 2.5 MG/DL (ref 1.6–2.4)
MCH RBC QN AUTO: 28.3 PG (ref 27–31)
MCHC RBC AUTO-ENTMCNC: 30 G/DL (ref 33–37)
MCV RBC AUTO: 94.3 FL (ref 80–94)
MONOCYTES # BLD: 0.9 K/UL (ref 0–0.9)
MONOCYTES NFR BLD: 12.8 % (ref 0–10)
NEUTROPHILS # BLD: 4.1 K/UL (ref 1.5–7.5)
NEUTS SEG NFR BLD: 59.7 % (ref 50–65)
PERFORMED ON: ABNORMAL
PERFORMED ON: ABNORMAL
PERFORMED ON: NORMAL
PLATELET # BLD AUTO: 248 K/UL (ref 130–400)
PMV BLD AUTO: 12 FL (ref 9.4–12.4)
POTASSIUM SERPL-SCNC: 3.9 MMOL/L (ref 3.5–5.1)
RBC # BLD AUTO: 3 M/UL (ref 4.7–6.1)
SODIUM SERPL-SCNC: 144 MMOL/L (ref 136–145)
WBC # BLD AUTO: 6.8 K/UL (ref 4.8–10.8)

## 2025-02-23 PROCEDURE — 1200000000 HC SEMI PRIVATE

## 2025-02-23 PROCEDURE — 83880 ASSAY OF NATRIURETIC PEPTIDE: CPT

## 2025-02-23 PROCEDURE — 6360000002 HC RX W HCPCS: Performed by: INTERNAL MEDICINE

## 2025-02-23 PROCEDURE — 94640 AIRWAY INHALATION TREATMENT: CPT

## 2025-02-23 PROCEDURE — 6370000000 HC RX 637 (ALT 250 FOR IP): Performed by: INTERNAL MEDICINE

## 2025-02-23 PROCEDURE — 6370000000 HC RX 637 (ALT 250 FOR IP): Performed by: NURSE PRACTITIONER

## 2025-02-23 PROCEDURE — 80048 BASIC METABOLIC PNL TOTAL CA: CPT

## 2025-02-23 PROCEDURE — 82962 GLUCOSE BLOOD TEST: CPT

## 2025-02-23 PROCEDURE — 85025 COMPLETE CBC W/AUTO DIFF WBC: CPT

## 2025-02-23 PROCEDURE — 2580000003 HC RX 258: Performed by: INTERNAL MEDICINE

## 2025-02-23 PROCEDURE — 2500000003 HC RX 250 WO HCPCS: Performed by: INTERNAL MEDICINE

## 2025-02-23 PROCEDURE — 36415 COLL VENOUS BLD VENIPUNCTURE: CPT

## 2025-02-23 PROCEDURE — 83735 ASSAY OF MAGNESIUM: CPT

## 2025-02-23 PROCEDURE — 94760 N-INVAS EAR/PLS OXIMETRY 1: CPT

## 2025-02-23 RX ORDER — BUMETANIDE 1 MG/1
2 TABLET ORAL 2 TIMES DAILY
Status: DISCONTINUED | OUTPATIENT
Start: 2025-02-23 | End: 2025-02-24

## 2025-02-23 RX ADMIN — PANTOPRAZOLE SODIUM 40 MG: 40 TABLET, DELAYED RELEASE ORAL at 08:14

## 2025-02-23 RX ADMIN — OXYCODONE HYDROCHLORIDE 10 MG: 10 TABLET ORAL at 20:32

## 2025-02-23 RX ADMIN — SODIUM CHLORIDE 125 MG: 9 INJECTION, SOLUTION INTRAVENOUS at 08:12

## 2025-02-23 RX ADMIN — INSULIN LISPRO 16 UNITS: 100 INJECTION, SOLUTION INTRAVENOUS; SUBCUTANEOUS at 20:31

## 2025-02-23 RX ADMIN — HEPARIN SODIUM 5000 UNITS: 5000 INJECTION INTRAVENOUS; SUBCUTANEOUS at 20:31

## 2025-02-23 RX ADMIN — LACTULOSE 10 G: 20 SOLUTION ORAL at 20:31

## 2025-02-23 RX ADMIN — BUMETANIDE 2 MG: 0.25 INJECTION INTRAMUSCULAR; INTRAVENOUS at 08:12

## 2025-02-23 RX ADMIN — IPRATROPIUM BROMIDE AND ALBUTEROL SULFATE 1 DOSE: 2.5; .5 SOLUTION RESPIRATORY (INHALATION) at 19:24

## 2025-02-23 RX ADMIN — INSULIN GLARGINE 50 UNITS: 100 INJECTION, SOLUTION SUBCUTANEOUS at 20:31

## 2025-02-23 RX ADMIN — HYDRALAZINE HYDROCHLORIDE 10 MG: 10 TABLET ORAL at 06:09

## 2025-02-23 RX ADMIN — METOPROLOL SUCCINATE 50 MG: 50 TABLET, EXTENDED RELEASE ORAL at 08:14

## 2025-02-23 RX ADMIN — PREGABALIN 100 MG: 50 CAPSULE ORAL at 20:32

## 2025-02-23 RX ADMIN — DAKIN'S SOLUTION 0.125% (QUARTER STRENGTH): 0.12 SOLUTION at 20:32

## 2025-02-23 RX ADMIN — ALLOPURINOL 200 MG: 100 TABLET ORAL at 08:14

## 2025-02-23 RX ADMIN — DAKIN'S SOLUTION 0.125% (QUARTER STRENGTH): 0.12 SOLUTION at 17:30

## 2025-02-23 RX ADMIN — BUMETANIDE 2 MG: 1 TABLET ORAL at 17:30

## 2025-02-23 RX ADMIN — SODIUM CHLORIDE, PRESERVATIVE FREE 10 ML: 5 INJECTION INTRAVENOUS at 20:32

## 2025-02-23 RX ADMIN — PREGABALIN 100 MG: 50 CAPSULE ORAL at 08:14

## 2025-02-23 RX ADMIN — TAMSULOSIN HYDROCHLORIDE 0.4 MG: 0.4 CAPSULE ORAL at 08:14

## 2025-02-23 RX ADMIN — HYDRALAZINE HYDROCHLORIDE 10 MG: 10 TABLET ORAL at 20:32

## 2025-02-23 RX ADMIN — DONEPEZIL HYDROCHLORIDE 10 MG: 5 TABLET, FILM COATED ORAL at 08:14

## 2025-02-23 RX ADMIN — LACTULOSE 10 G: 20 SOLUTION ORAL at 15:45

## 2025-02-23 RX ADMIN — LACTULOSE 10 G: 20 SOLUTION ORAL at 08:12

## 2025-02-23 RX ADMIN — SODIUM CHLORIDE, PRESERVATIVE FREE 10 ML: 5 INJECTION INTRAVENOUS at 09:00

## 2025-02-23 RX ADMIN — HYDRALAZINE HYDROCHLORIDE 10 MG: 10 TABLET ORAL at 15:45

## 2025-02-23 RX ADMIN — IPRATROPIUM BROMIDE AND ALBUTEROL SULFATE 1 DOSE: 2.5; .5 SOLUTION RESPIRATORY (INHALATION) at 14:07

## 2025-02-23 RX ADMIN — HEPARIN SODIUM 5000 UNITS: 5000 INJECTION INTRAVENOUS; SUBCUTANEOUS at 06:09

## 2025-02-23 RX ADMIN — HEPARIN SODIUM 5000 UNITS: 5000 INJECTION INTRAVENOUS; SUBCUTANEOUS at 15:45

## 2025-02-23 RX ADMIN — IPRATROPIUM BROMIDE AND ALBUTEROL SULFATE 1 DOSE: 2.5; .5 SOLUTION RESPIRATORY (INHALATION) at 10:16

## 2025-02-23 RX ADMIN — ROSUVASTATIN 10 MG: 10 TABLET, FILM COATED ORAL at 20:31

## 2025-02-23 RX ADMIN — ISOSORBIDE DINITRATE 30 MG: 10 TABLET ORAL at 08:14

## 2025-02-23 RX ADMIN — IPRATROPIUM BROMIDE AND ALBUTEROL SULFATE 1 DOSE: 2.5; .5 SOLUTION RESPIRATORY (INHALATION) at 07:02

## 2025-02-23 ASSESSMENT — PAIN DESCRIPTION - LOCATION: LOCATION: NECK

## 2025-02-23 ASSESSMENT — PAIN DESCRIPTION - DESCRIPTORS: DESCRIPTORS: ACHING;DISCOMFORT

## 2025-02-23 ASSESSMENT — PAIN SCALES - GENERAL: PAINLEVEL_OUTOF10: 7

## 2025-02-23 NOTE — PROGRESS NOTES
Renal progress Note    Thank you to requesting provider: Dr Prado, for asking us to see Cholo Livia    Reason for consultation:  SANGEETA, CKD    Chief Complaint:  Dyspnea.    History of Presenting Illness      Patient is a 69-year-old a pleasant man with a past medical history of type 2 diabetes, morbid obesity, CHF, chronic disease stage IV, recurrent diabetic feet ulcers and leg edema.  He has had a frequent hospital admission for CHF exacerbation and was managed with diuretics.  His kidney function also fluctuate a lot.  He is followed at the renal clinic by MAURICIO Chaparro.  Patient currently presents to the ER with increasing leg edema up to his mid abdomen as well as increasing shortness of breath.  He denies fever, chills, nausea vomiting and chest pain.  He noticed some drop in his urine output.  Patient is now managed with diuretics and he noticed some improvement in his urine output.  He also noticed some improvement in his respiratory status.  Today, patient denies much dyspnea.  He has good urine output.    Past Medical/Surgical History      Active Ambulatory Problems     Diagnosis Date Noted    No Active Ambulatory Problems     Resolved Ambulatory Problems     Diagnosis Date Noted    No Resolved Ambulatory Problems     No Additional Past Medical History         Review of Systems     Constitutional:  No weight loss, no fever/chills  Eyes:  No eye pain, no eye redness  Cardiovascular:  No chest pain, + worsening of edema  Respiratory:  No hemoptysis, no stidor  Gastrointestinal:  No blood in stool, no n/v, no diarrhea  Genitoruinary:  No hematuria, no difficulty with urination  Musculoskeletal:  No joint swelling, no redness  Integumentary:  No Rash, no itching  Neurological:  No focal weakness, No new sensory deficit  Psychiatric:  No depression, no confusion  Endocrine:  No polyuria, no polydipsia       Medications        Current Facility-Administered Medications:     allopurinol (ZYLOPRIM) tablet 200

## 2025-02-23 NOTE — PLAN OF CARE
Problem: Safety - Adult  Goal: Free from fall injury  Outcome: Progressing     Problem: Chronic Conditions and Co-morbidities  Goal: Patient's chronic conditions and co-morbidity symptoms are monitored and maintained or improved  Outcome: Progressing  Flowsheets (Taken 2/22/2025 2218)  Care Plan - Patient's Chronic Conditions and Co-Morbidity Symptoms are Monitored and Maintained or Improved: Monitor and assess patient's chronic conditions and comorbid symptoms for stability, deterioration, or improvement

## 2025-02-23 NOTE — PROGRESS NOTES
[facility]      Patient:  Cholo Bhakta  YOB: 1955  Date of Service: 2/23/2025  MRN: [id number]   Acct: [id number]   Primary Care Physician: Nirmal Nuno MD  Advance Directive: Full Code  Admit Date: 2/21/2025       Hospital Day: 2  Portions of this note have been copied forward, however, changed to reflect the most current clinical status of this patient.    CHIEF COMPLAINT Shortness of breath    SUBJECTIVE:  He remains on RA. Shortness of breath has improved. UOP 1800ml. VSS. No issues or events overnight.     CUMULATIVE HOSPITAL STAY:  The patient is a 70 yo male with a PMH of HTN, CAD, HFrEF, CKD stage IV, type 2 diabetes, and diabetic foot wound who presented to [facility] ED on 2/21/2025 with c/o shortness of breath. He stated that it had worsened over the last 2 weeks. He had been taking bumex, which has recently been increased, however, he had seen little improvement in his symptoms.  He additionally reported increased bilateral lower extremity edema going up to his lower abdomen.  He was seen by both his PCP and nephrologist who advised that he may need hospitalization for IV diuresis if he did not improve.  He additionally reported a chronic diabetic foot wound that was improving.  ED workup revealed-, CO2 19, BUN 75 and creatinine 3.3 (baseline 2.7-2.9 GFR-25.8) glucose 275.  proBNP 8096.  WBC 11.2, H&H 9.6/32.1 with a platelet count of 214.  RPP negative for viral illness.  UA negative for infection.  Chest x-ray pulmonary edema with bilateral pleural effusions.  The patient was admitted to hospital medicine with a nephrology consult.    He was placed on Bumex 2 mg IV twice daily, which continues.  Nephrology agrees with diuresis no need for renal replacement at this time.  Urine output 1800 ml overnight.  Renal functions remain stable BUN 74 and creatinine 3.0.     Hgb-8.5-iron panel-iron 28, TIBC-326, iron sat-9-he will receive IV iron replacement. No [id number]-0.8 and

## 2025-02-24 VITALS
BODY MASS INDEX: 39.12 KG/M2 | TEMPERATURE: 98.2 F | HEART RATE: 77 BPM | DIASTOLIC BLOOD PRESSURE: 75 MMHG | OXYGEN SATURATION: 98 % | SYSTOLIC BLOOD PRESSURE: 148 MMHG | RESPIRATION RATE: 20 BRPM | WEIGHT: 288.8 LBS | HEIGHT: 72 IN

## 2025-02-24 LAB
ANION GAP SERPL CALCULATED.3IONS-SCNC: 15 MMOL/L (ref 8–16)
BASOPHILS # BLD: 0.1 K/UL (ref 0–0.2)
BASOPHILS NFR BLD: 0.9 % (ref 0–1)
BUN SERPL-MCNC: 72 MG/DL (ref 8–23)
CALCIUM SERPL-MCNC: 9 MG/DL (ref 8.8–10.2)
CHLORIDE SERPL-SCNC: 106 MMOL/L (ref 98–107)
CO2 SERPL-SCNC: 25 MMOL/L (ref 22–29)
CREAT SERPL-MCNC: 3.1 MG/DL (ref 0.7–1.2)
EOSINOPHIL # BLD: 0.5 K/UL (ref 0–0.6)
EOSINOPHIL NFR BLD: 8.2 % (ref 0–5)
ERYTHROCYTE [DISTWIDTH] IN BLOOD BY AUTOMATED COUNT: 15 % (ref 11.5–14.5)
GLUCOSE BLD-MCNC: 119 MG/DL (ref 70–99)
GLUCOSE BLD-MCNC: 120 MG/DL (ref 70–99)
GLUCOSE BLD-MCNC: 56 MG/DL (ref 70–99)
GLUCOSE BLD-MCNC: 95 MG/DL (ref 70–99)
GLUCOSE SERPL-MCNC: 67 MG/DL (ref 70–99)
HCT VFR BLD AUTO: 28.5 % (ref 42–52)
HGB BLD-MCNC: 8.8 G/DL (ref 14–18)
IMM GRANULOCYTES # BLD: 0 K/UL
LYMPHOCYTES # BLD: 1.2 K/UL (ref 1.1–4.5)
LYMPHOCYTES NFR BLD: 18.4 % (ref 20–40)
MAGNESIUM SERPL-MCNC: 2.4 MG/DL (ref 1.6–2.4)
MCH RBC QN AUTO: 29.1 PG (ref 27–31)
MCHC RBC AUTO-ENTMCNC: 30.9 G/DL (ref 33–37)
MCV RBC AUTO: 94.4 FL (ref 80–94)
MONOCYTES # BLD: 0.9 K/UL (ref 0–0.9)
MONOCYTES NFR BLD: 13.6 % (ref 0–10)
NEUTROPHILS # BLD: 3.8 K/UL (ref 1.5–7.5)
NEUTS SEG NFR BLD: 58.6 % (ref 50–65)
PERFORMED ON: ABNORMAL
PERFORMED ON: NORMAL
PLATELET # BLD AUTO: 250 K/UL (ref 130–400)
PMV BLD AUTO: 11.3 FL (ref 9.4–12.4)
POTASSIUM SERPL-SCNC: 3.7 MMOL/L (ref 3.5–5.1)
RBC # BLD AUTO: 3.02 M/UL (ref 4.7–6.1)
SODIUM SERPL-SCNC: 146 MMOL/L (ref 136–145)
WBC # BLD AUTO: 6.5 K/UL (ref 4.8–10.8)

## 2025-02-24 PROCEDURE — 6360000002 HC RX W HCPCS: Performed by: INTERNAL MEDICINE

## 2025-02-24 PROCEDURE — 6370000000 HC RX 637 (ALT 250 FOR IP): Performed by: INTERNAL MEDICINE

## 2025-02-24 PROCEDURE — 2500000003 HC RX 250 WO HCPCS: Performed by: INTERNAL MEDICINE

## 2025-02-24 PROCEDURE — 82962 GLUCOSE BLOOD TEST: CPT

## 2025-02-24 PROCEDURE — 85025 COMPLETE CBC W/AUTO DIFF WBC: CPT

## 2025-02-24 PROCEDURE — 83735 ASSAY OF MAGNESIUM: CPT

## 2025-02-24 PROCEDURE — 94640 AIRWAY INHALATION TREATMENT: CPT

## 2025-02-24 PROCEDURE — 94760 N-INVAS EAR/PLS OXIMETRY 1: CPT

## 2025-02-24 PROCEDURE — 36415 COLL VENOUS BLD VENIPUNCTURE: CPT

## 2025-02-24 PROCEDURE — 6370000000 HC RX 637 (ALT 250 FOR IP): Performed by: NURSE PRACTITIONER

## 2025-02-24 PROCEDURE — 80048 BASIC METABOLIC PNL TOTAL CA: CPT

## 2025-02-24 RX ORDER — FERROUS SULFATE 325(65) MG
325 TABLET ORAL 2 TIMES DAILY
Qty: 180 TABLET | Refills: 1 | Status: SHIPPED | OUTPATIENT
Start: 2025-02-24

## 2025-02-24 RX ORDER — INSULIN GLARGINE 100 [IU]/ML
50 INJECTION, SOLUTION SUBCUTANEOUS NIGHTLY
Qty: 10 ML | Refills: 3 | Status: SHIPPED | OUTPATIENT
Start: 2025-02-24

## 2025-02-24 RX ORDER — INSULIN LISPRO 100 [IU]/ML
0-16 INJECTION, SOLUTION INTRAVENOUS; SUBCUTANEOUS
Status: DISCONTINUED | OUTPATIENT
Start: 2025-02-24 | End: 2025-02-24 | Stop reason: HOSPADM

## 2025-02-24 RX ORDER — ALLOPURINOL 200 MG/1
200 TABLET ORAL DAILY
Qty: 30 TABLET | Refills: 3 | Status: SHIPPED | OUTPATIENT
Start: 2025-02-25

## 2025-02-24 RX ORDER — BUMETANIDE 1 MG/1
1 TABLET ORAL 2 TIMES DAILY
Status: DISCONTINUED | OUTPATIENT
Start: 2025-02-24 | End: 2025-02-24 | Stop reason: HOSPADM

## 2025-02-24 RX ADMIN — IPRATROPIUM BROMIDE AND ALBUTEROL SULFATE 1 DOSE: 2.5; .5 SOLUTION RESPIRATORY (INHALATION) at 10:52

## 2025-02-24 RX ADMIN — DONEPEZIL HYDROCHLORIDE 10 MG: 5 TABLET, FILM COATED ORAL at 08:48

## 2025-02-24 RX ADMIN — ALLOPURINOL 200 MG: 100 TABLET ORAL at 08:48

## 2025-02-24 RX ADMIN — IPRATROPIUM BROMIDE AND ALBUTEROL SULFATE 1 DOSE: 2.5; .5 SOLUTION RESPIRATORY (INHALATION) at 06:31

## 2025-02-24 RX ADMIN — BUMETANIDE 2 MG: 1 TABLET ORAL at 08:48

## 2025-02-24 RX ADMIN — PREGABALIN 100 MG: 50 CAPSULE ORAL at 08:48

## 2025-02-24 RX ADMIN — IPRATROPIUM BROMIDE AND ALBUTEROL SULFATE 1 DOSE: 2.5; .5 SOLUTION RESPIRATORY (INHALATION) at 14:32

## 2025-02-24 RX ADMIN — DAKIN'S SOLUTION 0.125% (QUARTER STRENGTH): 0.12 SOLUTION at 11:16

## 2025-02-24 RX ADMIN — ISOSORBIDE DINITRATE 30 MG: 10 TABLET ORAL at 08:48

## 2025-02-24 RX ADMIN — LACTULOSE 10 G: 20 SOLUTION ORAL at 14:59

## 2025-02-24 RX ADMIN — HYDRALAZINE HYDROCHLORIDE 10 MG: 10 TABLET ORAL at 05:52

## 2025-02-24 RX ADMIN — TAMSULOSIN HYDROCHLORIDE 0.4 MG: 0.4 CAPSULE ORAL at 08:48

## 2025-02-24 RX ADMIN — PANTOPRAZOLE SODIUM 40 MG: 40 TABLET, DELAYED RELEASE ORAL at 08:48

## 2025-02-24 RX ADMIN — LACTULOSE 10 G: 20 SOLUTION ORAL at 08:48

## 2025-02-24 RX ADMIN — HEPARIN SODIUM 5000 UNITS: 5000 INJECTION INTRAVENOUS; SUBCUTANEOUS at 14:59

## 2025-02-24 RX ADMIN — OXYCODONE HYDROCHLORIDE 10 MG: 10 TABLET ORAL at 05:52

## 2025-02-24 RX ADMIN — METOPROLOL SUCCINATE 50 MG: 50 TABLET, EXTENDED RELEASE ORAL at 08:48

## 2025-02-24 RX ADMIN — HEPARIN SODIUM 5000 UNITS: 5000 INJECTION INTRAVENOUS; SUBCUTANEOUS at 05:52

## 2025-02-24 RX ADMIN — IRON SUCROSE 200 MG: 20 INJECTION, SOLUTION INTRAVENOUS at 11:16

## 2025-02-24 RX ADMIN — HYDRALAZINE HYDROCHLORIDE 10 MG: 10 TABLET ORAL at 15:00

## 2025-02-24 RX ADMIN — SODIUM CHLORIDE, PRESERVATIVE FREE 10 ML: 5 INJECTION INTRAVENOUS at 08:48

## 2025-02-24 ASSESSMENT — PAIN DESCRIPTION - LOCATION: LOCATION: NECK

## 2025-02-24 ASSESSMENT — PAIN DESCRIPTION - DESCRIPTORS: DESCRIPTORS: ACHING;DISCOMFORT

## 2025-02-24 ASSESSMENT — PAIN SCALES - GENERAL
PAINLEVEL_OUTOF10: 7
PAINLEVEL_OUTOF10: 0

## 2025-02-24 NOTE — DISCHARGE SUMMARY
Cholo Bhakta  :  1955  MRN:  [id number]    Admit date:  2025 Discharge date:  2025    Discharging Physician:  Dr Correa    Advance Directive: Full Code    Consults: IP CONSULT TO HEART FAILURE NURSE/COORDINATOR  IP CONSULT TO NEPHROLOGY     Primary Care Physician:  Nirmal Nuno MD    Discharge Diagnoses:  Principal Problem:    Acute on chronic systolic heart failure (HCC)  Active Problems:    CHF, left ventricular (HCC)    Volume overload    CKD (chronic kidney disease) stage 4, GFR 15-29 ml/min (HCC)    Cardiorenal syndrome    Chronic obstructive pulmonary disease (HCC)    Essential hypertension    Diabetes mellitus (HCC)    CAD (coronary artery disease)    Diabetic ulcer of left foot associated with type 2 diabetes mellitus (HCC)  Resolved Problems:    * No resolved hospital problems. *      Portions of this note have been copied forward, however, changed to reflect the most current clinical status of this patient.    Hospital Course:   The patient is a 70 yo male with a PMH of HTN, CAD, HFrEF, CKD stage IV, type 2 diabetes, and diabetic foot wound who presented to [facility] ED on 2025 with c/o shortness of breath. He stated that it had worsened over the last 2 weeks. He had been taking bumex, which has recently been increased, however, he had seen little improvement in his symptoms.  He additionally reported increased bilateral lower extremity edema going up to his lower abdomen.  He was seen by both his PCP and nephrologist who advised that he may need hospitalization for IV diuresis if he did not improve.  He additionally reported a chronic diabetic foot wound that was improving. ED workup revealed-, CO2 19, BUN 75 and creatinine 3.3 (baseline 2.7-2.9 GFR-25.8) glucose 275.  proBNP 8096.  WBC 11.2, H&H 9.6/32.1 with a platelet count of 214.  RPP negative for viral illness.  UA negative for infection.  Chest x-ray pulmonary edema with bilateral pleural effusions.  The patient was

## 2025-02-24 NOTE — CARE COORDINATION
02/24/25 1333   IMM Letter   IMM Letter given to Patient/Family/Significant other/Guardian/POA/by: letter signed by jose Jones RNCM   IMM Letter date given: 02/24/25   IMM Letter time given: 1030     Important Message from Medicare letter explained and provided to patient by Evie Carroll RN CM. All questions answered. Patient voiced understanding. Copy placed in soft chart.   Waives the 4 hour time requirements   Electronically signed by Evie Carroll RN on 2/24/2025 at 1:35 PM

## 2025-02-24 NOTE — PROGRESS NOTES
Nephrology ([facility]) Progress Note    Patient:  Cholo Bhakta  YOB: 1955  Date of Service: 2/24/2025  MRN: [id number]   Acct: [id number]   Primary Care Physician: Nirmal Nuno MD  Advance Directive: Full Code  Admit Date: 2/21/2025       Hospital Day: 3  Referring Provider: Leonel Correa MD    Patient independently seen and examined, Chart, Consults, Notes, Operative notes, Labs, Cardiology, and Radiology studies reviewed as available.    Chief complaint: Abnormal labs.    Subjective:  Cholo Bhakta is a 69 y.o. male for whom we were consulted for evaluation and treatment of acute kidney injury/chronic kidney disease.  He has history of stage IV chronic kidney disease due to diabetic nephropathy and follows in the office.  Patient also has history of diabetic foot ulcer affecting his left foot, coronary artery disease and congestive heart failure.  Presenting with increasing shortness of breath.  He was diagnosed with pulmonary edema/volume overload.  Patient has responded to intravenous diuretics with excellent urine output.  However he has stabilization of his serum creatinine as well.    This morning he is fully alert and awake.  He is sitting up and eating breakfast, denies any shortness of breath.    Allergies:  Bactrim [sulfamethoxazole-trimethoprim]    Medicines:  Current Facility-Administered Medications   Medication Dose Route Frequency Provider Last Rate Last Admin    insulin lispro (HUMALOG,ADMELOG) injection vial 0-16 Units  0-16 Units SubCUTAneous 4x Daily  Chaya Lozano, PHILOMENA        bumetanide (BUMEX) tablet 2 mg  2 mg Oral BID Lynda Coulter APRN   2 mg at 02/24/25 0848    allopurinol (ZYLOPRIM) tablet 200 mg  200 mg Oral Daily Roland Spicer MD   200 mg at 02/24/25 0848    donepezil (ARICEPT) tablet 10 mg  10 mg Oral Daily Joseph Prado MD   10 mg at 02/24/25 0848    hydrALAZINE (APRESOLINE) tablet 10 mg  10 mg Oral q8h Joseph Prado MD

## 2025-02-24 NOTE — PLAN OF CARE
Problem: Safety - Adult  Goal: Free from fall injury  Outcome: Progressing     Problem: Chronic Conditions and Co-morbidities  Goal: Patient's chronic conditions and co-morbidity symptoms are monitored and maintained or improved  Outcome: Progressing     Problem: Discharge Planning  Goal: Discharge to home or other facility with appropriate resources  Outcome: Progressing     Problem: Skin/Tissue Integrity  Goal: Skin integrity remains intact  Description: 1.  Monitor for areas of redness and/or skin breakdown  2.  Assess vascular access sites hourly  3.  Every 4-6 hours minimum:  Change oxygen saturation probe site  4.  Every 4-6 hours:  If on nasal continuous positive airway pressure, respiratory therapy assess nares and determine need for appliance change or resting period  Outcome: Progressing     Problem: ABCDS Injury Assessment  Goal: Absence of physical injury  Outcome: Progressing     Problem: Pain  Goal: Verbalizes/displays adequate comfort level or baseline comfort level  Outcome: Progressing

## 2025-02-27 LAB
EKG P AXIS: NORMAL DEGREES
EKG P-R INTERVAL: NORMAL MS
EKG Q-T INTERVAL: 358 MS
EKG QRS DURATION: 124 MS
EKG QTC CALCULATION (BAZETT): 401 MS
EKG T AXIS: 76 DEGREES

## 2025-03-05 ENCOUNTER — HOSPITAL ENCOUNTER (EMERGENCY)
Age: 70
Discharge: HOME | End: 2025-03-05

## 2025-03-05 VITALS — HEART RATE: 2 BPM | OXYGEN SATURATION: 96 % | TEMPERATURE: 97.4 F | RESPIRATION RATE: 18 BRPM

## 2025-03-05 NOTE — PROGRESS NOTES
"      [facility] Gastroenterology  Inpatient Progress Note     TODAY'S DATE: 02/13/19  Erick Luong  1955      Reason for Follow Up:   Abdominal pain          Subjective:      He feels better this am. No n/v. No abdominal pain. No fever. No bm.   Tolerating diet.       Vss, afebrile      Allergies   Allergen Reactions   • Bactrim [Sulfamethoxazole-Trimethoprim] Other (See Comments)     \"RENAL FAILURE\"       Current Facility-Administered Medications:   •  aspirin EC tablet 81 mg, 81 mg, Oral, Daily, Timbo Adame, , 81 mg at 02/13/19 0833  •  colchicine tablet 0.6 mg, 0.6 mg, Oral, BID PRN, Timbo Adame DO  •  dextrose (D50W) 25 g/ 50mL Intravenous Solution 25 g, 25 g, Intravenous, Q15 Min PRN, Raquel Duncan, APRN  •  dextrose (GLUTOSE) oral gel 15 g, 15 g, Oral, Q15 Min PRN, Raquel Duncan, APRN  •  DULoxetine (CYMBALTA) DR capsule 60 mg, 60 mg, Oral, Daily, Payam Keyes DO, 60 mg at 02/13/19 0833  •  enoxaparin (LOVENOX) syringe 30 mg, 30 mg, Subcutaneous, Q12H, Ken Perry MD, 30 mg at 02/13/19 0907  •  glucagon (human recombinant) (GLUCAGEN DIAGNOSTIC) injection 1 mg, 1 mg, Subcutaneous, PRN, Raquel Duncan, APRN  •  insulin lispro (humaLOG) injection 0-24 Units, 0-24 Units, Subcutaneous, 4x Daily With Meals & Nightly, Raquel uDncan, APRN, 8 Units at 02/13/19 0834  •  isosorbide mononitrate (IMDUR) 24 hr tablet 30 mg, 30 mg, Oral, Daily, Raquel Duncan APRN, 30 mg at 02/13/19 0833  •  midodrine (PROAMATINE) tablet 10 mg, 10 mg, Oral, TID With Meals, Raquel Duncan, APRN, 10 mg at 02/13/19 0833  •  ondansetron (ZOFRAN) 8 mg/50 mL NS IVPB, 8 mg, Intravenous, Q6H PRN, Ken Perry MD, 8 mg at 02/09/19 1746  •  ondansetron ODT (ZOFRAN-ODT) disintegrating tablet 8 mg, 8 mg, Oral, Q6H PRN, Ken Perry MD, 8 mg at 02/12/19 1018  •  oxyCODONE-acetaminophen (PERCOCET)  MG per tablet 1 tablet, 1 tablet, Oral, Q6H, Timbo Adame DO, 1 tablet at " "Daily Note     Today's date: 3/5/2025  Patient name: Sujey Webber  : 1950  MRN: [id number]  Referring provider: Kristy Antoine MD  Dx:   Encounter Diagnosis     ICD-10-CM    1. Malignant neoplasm of overlapping sites of left breast in female, estrogen receptor positive (HCC)  C50.812     Z17.0           Subjective: Feels she has finally recuperated from Covid.    Objective: See treatment diary below       bpm t/o session    Assessment: Small breaks given t/o session but overall did very well.  Progressed with addition of 1# ankle weight for some of her exercises. Able to appreciate a good stretch with unilateral pec stretch at door. Pt would benefit from continued PT to address goals.     Plan: Continue per plan of care.      Precautions: h/o L breast CA    POC expires Auth Status? (BOMN, approved, pending) Unit limit (Daily) Auth Start date Expiration date PT/OT + Visit Limit?   25 BOMN         Date of Service 1/29 2/3 2/17 2/26 3/5    Visits used 1 2 3 4 5    Visits remaining 99 99 99 99 99    Patient Education         [facility] Education provided        Lymphedema risk education Low risk- discussed                 Manual Ther         Scar mob         MLD                                    Ther Ex         Aerobic exe  NuStep 10 min UE bike   3' forward   3' back UBE 2'/4' standing L1 UBE 2'/4' standing L1       Balance exe  March in place x20 March in place x20      tandem stance  2x 30 sec  March in place x20     Tandem stance  2x 30 sec  March in place 1# 20x  Walking marches 4x w/ 1#  Tandem stance 2x30\"    Strengthening-UE  Bicep curl blue TB x20  Scaption x10  Seated ER at side x20  Row x20  Triceps ext x20   Bicep curl G band x20   Scaption x10  Seated ER at side x20  G band   Row GTB x20  Triceps ext x20 GTB Bicep curl seated 5#DB x10  OH press seated 3# DB x10  Seated lateral raise 3#DB x10 Bicep curl seated 5#DB 3x8  OH press seated 3# DB 2x10  Seated lateral raise 3#DB 2x10  " "  Strengthening- LE  Heel raise x20  Sit to stand x10  Standing hip abd x20 Heel raise x20  Sit to stand x10  Standing hip abd x20   Heel raise x20  Sit to stand x10  Standing hip abd x20 Heel raise x20  Sit to stand x10  Standing hip abd 2x10 w/ 1#    Flexibility- UE     ** Pec stretch 3x15\"    Flexibility- LE    **     Therapeutic Rest Breaks   Required seated breaks throughout      Ther Activity & Self Care                                                                    " 02/13/19 0833  •  pantoprazole (PROTONIX) EC tablet 40 mg, 40 mg, Oral, Daily, Raquel Duncan, APRN, 40 mg at 02/13/19 0908  •  polyethylene glycol (MIRALAX) powder 17 g, 17 g, Oral, Daily, Jhonny Justyna J, APRN, 17 g at 02/13/19 0833  •  rosuvastatin (CRESTOR) tablet 40 mg, 40 mg, Oral, Daily, Raquel Duncan APRN, 40 mg at 02/13/19 0908  •  sodium chloride 0.9 % flush 1-10 mL, 1-10 mL, Intravenous, PRN, Ken Perry MD, 10 mL at 02/09/19 0102  •  sodium chloride 0.9 % flush 10 mL, 10 mL, Intravenous, PRN, Ken Perry MD, 10 mL at 02/09/19 0622  •  sodium chloride 0.9 % flush 3 mL, 3 mL, Intravenous, Q12H, Ken Perry MD, 3 mL at 02/13/19 0909  •  sodium chloride 0.9 % infusion 500 mL, 500 mL, Intravenous, Continuous PRN, Chacha Torres MD    Review of Systems    Objective     Vital Signs  Temp:  [97.8 °F (36.6 °C)-98.1 °F (36.7 °C)] 97.8 °F (36.6 °C)  Heart Rate:  [60-69] 61  Resp:  [16-20] 16  BP: (140-151)/(55-68) 140/61  Body mass index is 43.05 kg/m².  No intake or output data in the 24 hours ending 02/13/19 1238  No intake/output data recorded.       PHYSICAL EXAM  Physical Exam        Results Review:   I have reviewed all of the patient's current test results    Results from last 7 days   Lab Units 02/13/19  0639 02/10/19  0602 02/09/19  0630   WBC 10*3/mm3 6.36 7.06 7.08   HEMOGLOBIN g/dL 10.9* 10.9* 12.0*   HEMATOCRIT % 32.1* 32.6* 36.4*   PLATELETS 10*3/mm3 246 232 248       Results from last 7 days   Lab Units 02/13/19  0639 02/10/19  0602 02/09/19  0629 02/08/19  0631   SODIUM mmol/L 134* 136 136 136   POTASSIUM mmol/L 4.1 4.1 4.2 4.2   CHLORIDE mmol/L 102 100 97* 97*   CO2 mmol/L 27.0 28.0 30.0 29.0   BUN mg/dL 12 14 17 16   CREATININE mg/dL 1.13 1.37 1.62* 1.41*   CALCIUM mg/dL 8.6 8.6 9.0 8.7   BILIRUBIN mg/dL 0.5  --  1.1* 0.9   ALK PHOS U/L 54  --  60 59   ALT (SGPT) U/L 24  --  21 21   AST (SGOT) U/L 27  --  31 29   GLUCOSE mg/dL 197* 148* 205* 240*       Results from  last 7 days   Lab Units 02/08/19  0631   INR  0.96       Lab Results   Lab Value Date/Time    LIPASE 25 02/08/2019 0631    LIPASE 16 (L) 02/07/2019 1742    LIPASE 38 01/09/2019 1120    LIPASE 42 05/23/2017 0532    LIPASE 76 04/23/2017 2003    LIPASE 197 04/13/2017 1702    LIPASE 60 03/29/2017 0602    LIPASE 115 03/28/2017 0352    LIPASE 603 (H) 03/27/2017 0003    LIPASE 108 12/22/2016 0536    LIPASE 257 (H) 12/21/2016 0256    LIPASE 29 09/19/2016 1802    LIPASE 11 (L) 08/29/2016 1923    LIPASE 264 (H) 06/11/2015 0144    LIPASE 120 02/28/2014 1558    LIPASE 57 02/27/2014 1110       Radiology Review:  Imaging Results (last 24 hours)     ** No results found for the last 24 hours. **            Assessment/Plan     Patient Active Problem List   Diagnosis Code   • Lower abdominal pain R10.30   • SIRS (systemic inflammatory response syndrome) (CMS/[facility]) R65.10   • Fever, unknown origin R50.9   • Viral gastroenteritis A08.4   • Chronic diastolic congestive heart failure (CMS/HCC) I50.32   • CAD (coronary artery disease) I25.10   • Diabetes mellitus (CMS/[facility]) E11.9   • Hypertension I10   • Sleep apnea G47.30   • Arthritis M19.90   • Mixed hyperlipidemia E78.2   • Shortness of breath R06.02   • Acute pancreatitis K85.90   • Chest pain, non-cardiac R07.89   • Obesity, unspecified obesity severity, unspecified obesity type E66.9   • HTN (hypertension), benign I10   • Epigastric pain R10.13   • Type 2 diabetes mellitus with hyperglycemia, with long-term current use of insulin (CMS/HCC) E11.65, Z79.4   • Pleuritic chest pain R07.81   • Slow transit constipation K59.01   • Nausea and vomiting R11.2   • Chronic kidney disease, stage III (moderate) (CMS/[facility]) N18.3   • Nonsmoker Z78.9   • Orthostatic hypotension I95.1   • Abdominal pain R10.9   • Constipation K59.00   • Ischemic colitis (CMS/HCC) K55.9       1. Abnormal ct abd/pelvis   ( Small amounts of mesenteric venous air within the upper abdomen and  small amounts of venous air  within the liver). Repeat ct showed portal air resolved.   2. Abdominal pain -resolved at present.   3. N/v -no further vomiting.   4.  Obstipation-resolved         He overall is improving.  Okay to stop antibiotics from GI standpoint.  Okay to discharge when okay with others.  Advised MiraLAX daily at home and titrate the dose to help with his chronic constipation.  I favor MiraLAX over lactulose.      I discussed the patients findings and my recommendations with patient      EMR Dragon/transcription disclaimer:  Much of this encounter note is electronic transcription/translation of spoken language to printed text.  The electronic translation of spoken language may be erroneous, or at times, nonsensical words or phrases may be inadvertently transcribed.  Although I have reviewed the note for such errors, some may still exist.      Justyna DAVIS  10:25 AM    · I have seen the patient personally with evaluation and plan of care reviewed and developed with APRN and nursing staff. Where indicated, I have addended and/or modified the above history of present illness, physical examination, and assessment and plan to reflect my findings and impressions. Essential elements of the care plan were discussed with APRN above.  Agree with findings and assessment/plan as documented above.    Okay for discharge when okay with others.  Okay to stop antibiotics from GI view.  He is going to resume his Linzess at home and take MiraLAX along with it.  He will adjust the dose of MiraLAX as needed. Follow-up with Yuliana and Dr. Hannah as needed.  I will sign off please call if needed.    Tyrell Smith MD  02/13/19  12:38 PM

## 2025-03-06 ENCOUNTER — APPOINTMENT (OUTPATIENT)
Dept: GENERAL RADIOLOGY | Age: 70
End: 2025-03-06
Payer: MEDICARE

## 2025-03-06 ENCOUNTER — HOSPITAL ENCOUNTER (INPATIENT)
Age: 70
LOS: 8 days | Discharge: HOME | End: 2025-03-14
Attending: EMERGENCY MEDICINE | Admitting: INTERNAL MEDICINE
Payer: MEDICARE

## 2025-03-06 DIAGNOSIS — I50.9 ACUTE CONGESTIVE HEART FAILURE, UNSPECIFIED HEART FAILURE TYPE (HCC): Primary | ICD-10-CM

## 2025-03-06 DIAGNOSIS — N18.9 ACUTE KIDNEY INJURY SUPERIMPOSED ON CKD: ICD-10-CM

## 2025-03-06 DIAGNOSIS — I50.21 ACUTE SYSTOLIC CONGESTIVE HEART FAILURE (HCC): ICD-10-CM

## 2025-03-06 DIAGNOSIS — N17.9 ACUTE KIDNEY INJURY SUPERIMPOSED ON CKD: ICD-10-CM

## 2025-03-06 PROBLEM — K21.9 GERD (GASTROESOPHAGEAL REFLUX DISEASE): Status: ACTIVE | Noted: 2025-03-06

## 2025-03-06 PROBLEM — R41.3 MEMORY LOSS: Status: ACTIVE | Noted: 2025-03-06

## 2025-03-06 PROBLEM — M10.9 GOUT: Status: ACTIVE | Noted: 2025-03-06

## 2025-03-06 PROBLEM — G62.9 NEUROPATHY: Status: ACTIVE | Noted: 2025-03-06

## 2025-03-06 PROBLEM — N40.0 BPH (BENIGN PROSTATIC HYPERPLASIA): Status: ACTIVE | Noted: 2025-03-06

## 2025-03-06 LAB
ALBUMIN SERPL-MCNC: 3.9 G/DL (ref 3.5–5.2)
ALP SERPL-CCNC: 155 U/L (ref 40–129)
ALT SERPL-CCNC: 44 U/L (ref 10–50)
ANION GAP SERPL CALCULATED.3IONS-SCNC: 12 MMOL/L (ref 8–16)
AST SERPL-CCNC: 32 U/L (ref 10–50)
BASOPHILS # BLD: 0.1 K/UL (ref 0–0.2)
BASOPHILS NFR BLD: 0.9 % (ref 0–1)
BILIRUB SERPL-MCNC: 0.3 MG/DL (ref 0.2–1.2)
BNP BLD-MCNC: 8365 PG/ML (ref 0–124)
BUN SERPL-MCNC: 94 MG/DL (ref 8–23)
CALCIUM SERPL-MCNC: 9 MG/DL (ref 8.8–10.2)
CHLORIDE SERPL-SCNC: 103 MMOL/L (ref 98–107)
CO2 SERPL-SCNC: 23 MMOL/L (ref 22–29)
CREAT SERPL-MCNC: 3.3 MG/DL (ref 0.7–1.2)
EKG P AXIS: NORMAL DEGREES
EKG P-R INTERVAL: NORMAL MS
EKG Q-T INTERVAL: 362 MS
EKG QRS DURATION: 132 MS
EKG QTC CALCULATION (BAZETT): 405 MS
EKG T AXIS: 110 DEGREES
EOSINOPHIL # BLD: 0.6 K/UL (ref 0–0.6)
EOSINOPHIL NFR BLD: 6.7 % (ref 0–5)
ERYTHROCYTE [DISTWIDTH] IN BLOOD BY AUTOMATED COUNT: 15.4 % (ref 11.5–14.5)
FERRITIN SERPL-MCNC: 505 NG/ML (ref 30–400)
GLUCOSE BLD-MCNC: 167 MG/DL (ref 70–99)
GLUCOSE BLD-MCNC: 203 MG/DL (ref 70–99)
GLUCOSE SERPL-MCNC: 348 MG/DL (ref 70–99)
HCT VFR BLD AUTO: 28.4 % (ref 42–52)
HGB BLD-MCNC: 9 G/DL (ref 14–18)
IMM GRANULOCYTES # BLD: 0 K/UL
IRON SATN MFR SERPL: 10 % (ref 20–50)
IRON SERPL-MCNC: 24 UG/DL (ref 59–158)
LYMPHOCYTES # BLD: 0.9 K/UL (ref 1.1–4.5)
LYMPHOCYTES NFR BLD: 10.1 % (ref 20–40)
MCH RBC QN AUTO: 29.2 PG (ref 27–31)
MCHC RBC AUTO-ENTMCNC: 31.7 G/DL (ref 33–37)
MCV RBC AUTO: 92.2 FL (ref 80–94)
MONOCYTES # BLD: 0.9 K/UL (ref 0–0.9)
MONOCYTES NFR BLD: 9.8 % (ref 0–10)
NEUTROPHILS # BLD: 6.3 K/UL (ref 1.5–7.5)
NEUTS SEG NFR BLD: 72 % (ref 50–65)
PERFORMED ON: ABNORMAL
PERFORMED ON: ABNORMAL
PLATELET # BLD AUTO: 209 K/UL (ref 130–400)
PMV BLD AUTO: 11.3 FL (ref 9.4–12.4)
POTASSIUM SERPL-SCNC: 5.2 MMOL/L (ref 3.5–5.1)
PROT SERPL-MCNC: 6.7 G/DL (ref 6.4–8.3)
RBC # BLD AUTO: 3.08 M/UL (ref 4.7–6.1)
SODIUM SERPL-SCNC: 138 MMOL/L (ref 136–145)
T4 FREE SERPL-MCNC: 1.41 NG/DL (ref 0.93–1.7)
TIBC SERPL-MCNC: 248 UG/DL (ref 250–400)
TSH SERPL DL<=0.005 MIU/L-ACNC: 1.93 UIU/ML (ref 0.27–4.2)
WBC # BLD AUTO: 8.8 K/UL (ref 4.8–10.8)

## 2025-03-06 PROCEDURE — 85025 COMPLETE CBC W/AUTO DIFF WBC: CPT

## 2025-03-06 PROCEDURE — 6370000000 HC RX 637 (ALT 250 FOR IP)

## 2025-03-06 PROCEDURE — 6360000002 HC RX W HCPCS

## 2025-03-06 PROCEDURE — 80053 COMPREHEN METABOLIC PANEL: CPT

## 2025-03-06 PROCEDURE — 2500000003 HC RX 250 WO HCPCS

## 2025-03-06 PROCEDURE — 83880 ASSAY OF NATRIURETIC PEPTIDE: CPT

## 2025-03-06 PROCEDURE — 99285 EMERGENCY DEPT VISIT HI MDM: CPT

## 2025-03-06 PROCEDURE — 82962 GLUCOSE BLOOD TEST: CPT

## 2025-03-06 PROCEDURE — 84439 ASSAY OF FREE THYROXINE: CPT

## 2025-03-06 PROCEDURE — 83540 ASSAY OF IRON: CPT

## 2025-03-06 PROCEDURE — 1200000000 HC SEMI PRIVATE

## 2025-03-06 PROCEDURE — 83550 IRON BINDING TEST: CPT

## 2025-03-06 PROCEDURE — 36415 COLL VENOUS BLD VENIPUNCTURE: CPT

## 2025-03-06 PROCEDURE — 82728 ASSAY OF FERRITIN: CPT

## 2025-03-06 PROCEDURE — 71045 X-RAY EXAM CHEST 1 VIEW: CPT

## 2025-03-06 PROCEDURE — 6360000002 HC RX W HCPCS: Performed by: EMERGENCY MEDICINE

## 2025-03-06 PROCEDURE — 96374 THER/PROPH/DIAG INJ IV PUSH: CPT

## 2025-03-06 PROCEDURE — 84443 ASSAY THYROID STIM HORMONE: CPT

## 2025-03-06 RX ORDER — ALLOPURINOL 100 MG/1
200 TABLET ORAL DAILY
Status: DISCONTINUED | OUTPATIENT
Start: 2025-03-06 | End: 2025-03-14 | Stop reason: HOSPADM

## 2025-03-06 RX ORDER — SODIUM CHLORIDE 0.9 % (FLUSH) 0.9 %
5-40 SYRINGE (ML) INJECTION EVERY 12 HOURS SCHEDULED
Status: DISCONTINUED | OUTPATIENT
Start: 2025-03-06 | End: 2025-03-14 | Stop reason: HOSPADM

## 2025-03-06 RX ORDER — BUMETANIDE 1 MG/1
2 TABLET ORAL 2 TIMES DAILY
Status: DISCONTINUED | OUTPATIENT
Start: 2025-03-06 | End: 2025-03-14 | Stop reason: HOSPADM

## 2025-03-06 RX ORDER — MAGNESIUM SULFATE IN WATER 40 MG/ML
2000 INJECTION, SOLUTION INTRAVENOUS PRN
Status: DISCONTINUED | OUTPATIENT
Start: 2025-03-06 | End: 2025-03-14 | Stop reason: HOSPADM

## 2025-03-06 RX ORDER — SERTRALINE HYDROCHLORIDE 25 MG/1
25 TABLET, FILM COATED ORAL DAILY
Status: ON HOLD | COMMUNITY
Start: 2025-02-12 | End: 2025-03-14 | Stop reason: HOSPADM

## 2025-03-06 RX ORDER — DULOXETIN HYDROCHLORIDE 60 MG/1
60 CAPSULE, DELAYED RELEASE ORAL DAILY
COMMUNITY

## 2025-03-06 RX ORDER — CALCITRIOL 0.25 UG/1
0.25 CAPSULE, LIQUID FILLED ORAL DAILY
Status: DISCONTINUED | OUTPATIENT
Start: 2025-03-06 | End: 2025-03-14 | Stop reason: HOSPADM

## 2025-03-06 RX ORDER — APIXABAN 5 MG/1
5 TABLET, FILM COATED ORAL 2 TIMES DAILY
COMMUNITY
Start: 2025-01-15

## 2025-03-06 RX ORDER — DELAFLOXACIN MEGLUMINE 450 MG/1
450 TABLET ORAL EVERY 12 HOURS
Status: ON HOLD | COMMUNITY
End: 2025-03-14 | Stop reason: HOSPADM

## 2025-03-06 RX ORDER — AZELASTINE 1 MG/ML
2 SPRAY, METERED NASAL 2 TIMES DAILY PRN
COMMUNITY

## 2025-03-06 RX ORDER — MOMETASONE FUROATE MONOHYDRATE 50 UG/1
2 SPRAY, METERED NASAL DAILY
COMMUNITY
Start: 2025-02-12

## 2025-03-06 RX ORDER — PANTOPRAZOLE SODIUM 40 MG/1
40 TABLET, DELAYED RELEASE ORAL DAILY
Status: DISCONTINUED | OUTPATIENT
Start: 2025-03-06 | End: 2025-03-14 | Stop reason: HOSPADM

## 2025-03-06 RX ORDER — GLUCAGON 1 MG/ML
1 KIT INJECTION PRN
Status: DISCONTINUED | OUTPATIENT
Start: 2025-03-06 | End: 2025-03-14 | Stop reason: HOSPADM

## 2025-03-06 RX ORDER — SUCRALFATE 1 G/1
1 TABLET ORAL 4 TIMES DAILY
Status: DISCONTINUED | OUTPATIENT
Start: 2025-03-06 | End: 2025-03-14 | Stop reason: HOSPADM

## 2025-03-06 RX ORDER — BUMETANIDE 1 MG/1
2 TABLET ORAL DAILY
Status: ON HOLD | COMMUNITY
End: 2025-03-14 | Stop reason: HOSPADM

## 2025-03-06 RX ORDER — PREGABALIN 50 MG/1
100 CAPSULE ORAL 2 TIMES DAILY
Status: DISCONTINUED | OUTPATIENT
Start: 2025-03-06 | End: 2025-03-14 | Stop reason: HOSPADM

## 2025-03-06 RX ORDER — POTASSIUM CHLORIDE 7.45 MG/ML
10 INJECTION INTRAVENOUS PRN
Status: DISCONTINUED | OUTPATIENT
Start: 2025-03-06 | End: 2025-03-14 | Stop reason: HOSPADM

## 2025-03-06 RX ORDER — SODIUM CHLORIDE 9 MG/ML
INJECTION, SOLUTION INTRAVENOUS PRN
Status: DISCONTINUED | OUTPATIENT
Start: 2025-03-06 | End: 2025-03-14 | Stop reason: HOSPADM

## 2025-03-06 RX ORDER — MECLIZINE HYDROCHLORIDE 25 MG/1
25 TABLET ORAL 3 TIMES DAILY PRN
Status: DISCONTINUED | OUTPATIENT
Start: 2025-03-06 | End: 2025-03-14 | Stop reason: HOSPADM

## 2025-03-06 RX ORDER — DOCUSATE SODIUM 100 MG/1
100 CAPSULE, LIQUID FILLED ORAL 2 TIMES DAILY
COMMUNITY

## 2025-03-06 RX ORDER — ENOXAPARIN SODIUM 100 MG/ML
30 INJECTION SUBCUTANEOUS 2 TIMES DAILY
Status: DISCONTINUED | OUTPATIENT
Start: 2025-03-06 | End: 2025-03-08

## 2025-03-06 RX ORDER — ISOSORBIDE DINITRATE 10 MG/1
10 TABLET ORAL 3 TIMES DAILY
Status: DISCONTINUED | OUTPATIENT
Start: 2025-03-06 | End: 2025-03-14 | Stop reason: HOSPADM

## 2025-03-06 RX ORDER — INSULIN GLARGINE 100 [IU]/ML
20 INJECTION, SOLUTION SUBCUTANEOUS NIGHTLY
Status: ON HOLD | COMMUNITY
End: 2025-03-14

## 2025-03-06 RX ORDER — POLYETHYLENE GLYCOL 3350 17 G/17G
17 POWDER, FOR SOLUTION ORAL DAILY PRN
Status: DISCONTINUED | OUTPATIENT
Start: 2025-03-06 | End: 2025-03-14 | Stop reason: HOSPADM

## 2025-03-06 RX ORDER — SODIUM CHLORIDE 0.9 % (FLUSH) 0.9 %
5-40 SYRINGE (ML) INJECTION PRN
Status: DISCONTINUED | OUTPATIENT
Start: 2025-03-06 | End: 2025-03-14 | Stop reason: HOSPADM

## 2025-03-06 RX ORDER — INSULIN LISPRO 100 [IU]/ML
0-4 INJECTION, SOLUTION INTRAVENOUS; SUBCUTANEOUS
Status: DISCONTINUED | OUTPATIENT
Start: 2025-03-06 | End: 2025-03-08

## 2025-03-06 RX ORDER — SODIUM HYPOCHLORITE 1.25 MG/ML
1 SOLUTION TOPICAL 2 TIMES DAILY
COMMUNITY
Start: 2024-10-28

## 2025-03-06 RX ORDER — LISINOPRIL 5 MG/1
5 TABLET ORAL DAILY
Status: ON HOLD | COMMUNITY
End: 2025-03-14 | Stop reason: HOSPADM

## 2025-03-06 RX ORDER — ROSUVASTATIN CALCIUM 20 MG/1
10 TABLET, COATED ORAL NIGHTLY
Status: DISCONTINUED | OUTPATIENT
Start: 2025-03-06 | End: 2025-03-14 | Stop reason: HOSPADM

## 2025-03-06 RX ORDER — ACETAMINOPHEN 325 MG/1
650 TABLET ORAL EVERY 6 HOURS PRN
Status: DISCONTINUED | OUTPATIENT
Start: 2025-03-06 | End: 2025-03-14 | Stop reason: HOSPADM

## 2025-03-06 RX ORDER — INSULIN GLARGINE 100 [IU]/ML
50 INJECTION, SOLUTION SUBCUTANEOUS NIGHTLY
Status: DISCONTINUED | OUTPATIENT
Start: 2025-03-06 | End: 2025-03-09

## 2025-03-06 RX ORDER — OXYCODONE HYDROCHLORIDE 10 MG/1
10 TABLET ORAL EVERY 8 HOURS PRN
Status: DISCONTINUED | OUTPATIENT
Start: 2025-03-06 | End: 2025-03-14 | Stop reason: HOSPADM

## 2025-03-06 RX ORDER — DEXTROSE MONOHYDRATE 100 MG/ML
INJECTION, SOLUTION INTRAVENOUS CONTINUOUS PRN
Status: DISCONTINUED | OUTPATIENT
Start: 2025-03-06 | End: 2025-03-14 | Stop reason: HOSPADM

## 2025-03-06 RX ORDER — DONEPEZIL HYDROCHLORIDE 5 MG/1
10 TABLET, FILM COATED ORAL DAILY
Status: DISCONTINUED | OUTPATIENT
Start: 2025-03-06 | End: 2025-03-14 | Stop reason: HOSPADM

## 2025-03-06 RX ORDER — METOPROLOL SUCCINATE 50 MG/1
50 TABLET, EXTENDED RELEASE ORAL DAILY
Status: DISCONTINUED | OUTPATIENT
Start: 2025-03-06 | End: 2025-03-14 | Stop reason: HOSPADM

## 2025-03-06 RX ORDER — POTASSIUM CHLORIDE 1500 MG/1
40 TABLET, EXTENDED RELEASE ORAL PRN
Status: DISCONTINUED | OUTPATIENT
Start: 2025-03-06 | End: 2025-03-14 | Stop reason: HOSPADM

## 2025-03-06 RX ORDER — FAMOTIDINE 20 MG/1
20 TABLET, FILM COATED ORAL 2 TIMES DAILY
COMMUNITY

## 2025-03-06 RX ORDER — ALBUTEROL SULFATE 90 UG/1
2 INHALANT RESPIRATORY (INHALATION) EVERY 4 HOURS PRN
COMMUNITY

## 2025-03-06 RX ORDER — BUSPIRONE HYDROCHLORIDE 10 MG/1
10 TABLET ORAL 3 TIMES DAILY PRN
COMMUNITY
Start: 2024-12-01

## 2025-03-06 RX ORDER — TAMSULOSIN HYDROCHLORIDE 0.4 MG/1
0.4 CAPSULE ORAL DAILY
Status: DISCONTINUED | OUTPATIENT
Start: 2025-03-06 | End: 2025-03-14 | Stop reason: HOSPADM

## 2025-03-06 RX ORDER — HYDRALAZINE HYDROCHLORIDE 10 MG/1
10 TABLET, FILM COATED ORAL EVERY 8 HOURS
Status: DISCONTINUED | OUTPATIENT
Start: 2025-03-06 | End: 2025-03-14 | Stop reason: HOSPADM

## 2025-03-06 RX ORDER — BUMETANIDE 0.25 MG/ML
1 INJECTION, SOLUTION INTRAMUSCULAR; INTRAVENOUS 2 TIMES DAILY
Status: DISCONTINUED | OUTPATIENT
Start: 2025-03-06 | End: 2025-03-07

## 2025-03-06 RX ORDER — ONDANSETRON 2 MG/ML
4 INJECTION INTRAMUSCULAR; INTRAVENOUS EVERY 6 HOURS PRN
Status: DISCONTINUED | OUTPATIENT
Start: 2025-03-06 | End: 2025-03-14 | Stop reason: HOSPADM

## 2025-03-06 RX ORDER — ONDANSETRON 4 MG/1
4 TABLET, ORALLY DISINTEGRATING ORAL EVERY 8 HOURS PRN
Status: DISCONTINUED | OUTPATIENT
Start: 2025-03-06 | End: 2025-03-14 | Stop reason: HOSPADM

## 2025-03-06 RX ORDER — BUMETANIDE 0.25 MG/ML
1 INJECTION, SOLUTION INTRAMUSCULAR; INTRAVENOUS ONCE
Status: COMPLETED | OUTPATIENT
Start: 2025-03-06 | End: 2025-03-06

## 2025-03-06 RX ADMIN — CALCITRIOL CAPSULES 0.25 MCG 0.25 MCG: 0.25 CAPSULE ORAL at 17:02

## 2025-03-06 RX ADMIN — ALLOPURINOL 200 MG: 100 TABLET ORAL at 17:02

## 2025-03-06 RX ADMIN — INSULIN LISPRO 1 UNITS: 100 INJECTION, SOLUTION INTRAVENOUS; SUBCUTANEOUS at 17:01

## 2025-03-06 RX ADMIN — OXYCODONE HYDROCHLORIDE 10 MG: 10 TABLET ORAL at 21:38

## 2025-03-06 RX ADMIN — ISOSORBIDE DINITRATE 10 MG: 10 TABLET ORAL at 21:32

## 2025-03-06 RX ADMIN — ROSUVASTATIN CALCIUM 10 MG: 20 TABLET, FILM COATED ORAL at 21:32

## 2025-03-06 RX ADMIN — BUMETANIDE 1 MG: 0.25 INJECTION INTRAMUSCULAR; INTRAVENOUS at 14:40

## 2025-03-06 RX ADMIN — ENOXAPARIN SODIUM 30 MG: 100 INJECTION SUBCUTANEOUS at 21:32

## 2025-03-06 RX ADMIN — METOPROLOL SUCCINATE 50 MG: 50 TABLET, FILM COATED, EXTENDED RELEASE ORAL at 17:02

## 2025-03-06 RX ADMIN — BUMETANIDE 1 MG: 0.25 INJECTION INTRAMUSCULAR; INTRAVENOUS at 17:01

## 2025-03-06 RX ADMIN — SODIUM CHLORIDE, PRESERVATIVE FREE 10 ML: 5 INJECTION INTRAVENOUS at 21:47

## 2025-03-06 RX ADMIN — SODIUM CHLORIDE, PRESERVATIVE FREE 10 ML: 5 INJECTION INTRAVENOUS at 17:02

## 2025-03-06 RX ADMIN — ONDANSETRON 4 MG: 4 TABLET, ORALLY DISINTEGRATING ORAL at 21:38

## 2025-03-06 RX ADMIN — TAMSULOSIN HYDROCHLORIDE 0.4 MG: 0.4 CAPSULE ORAL at 17:02

## 2025-03-06 RX ADMIN — SUCRALFATE 1 G: 1 TABLET ORAL at 17:02

## 2025-03-06 RX ADMIN — SUCRALFATE 1 G: 1 TABLET ORAL at 21:32

## 2025-03-06 RX ADMIN — INSULIN GLARGINE 50 UNITS: 100 INJECTION, SOLUTION SUBCUTANEOUS at 21:33

## 2025-03-06 RX ADMIN — HYDRALAZINE HYDROCHLORIDE 10 MG: 10 TABLET ORAL at 17:02

## 2025-03-06 RX ADMIN — PREGABALIN 100 MG: 50 CAPSULE ORAL at 21:32

## 2025-03-06 ASSESSMENT — PAIN DESCRIPTION - LOCATION: LOCATION: NECK

## 2025-03-06 ASSESSMENT — PAIN - FUNCTIONAL ASSESSMENT: PAIN_FUNCTIONAL_ASSESSMENT: ACTIVITIES ARE NOT PREVENTED

## 2025-03-06 ASSESSMENT — PAIN SCALES - GENERAL: PAINLEVEL_OUTOF10: 7

## 2025-03-06 ASSESSMENT — PAIN DESCRIPTION - ORIENTATION: ORIENTATION: MID

## 2025-03-06 ASSESSMENT — PAIN DESCRIPTION - DESCRIPTORS: DESCRIPTORS: ACHING;SHARP

## 2025-03-06 NOTE — PROGRESS NOTES
4 Eyes Skin Assessment     NAME:  Cholo Bhakta  YOB: 1955  MEDICAL RECORD NUMBER:  [id number]    The patient is being assessed for  Admission    I agree that at least one RN has performed a thorough Head to Toe Skin Assessment on the patient. ALL assessment sites listed below have been assessed.      Areas assessed by both nurses:    Head, Face, Ears, Shoulders, Back, Chest, Arms, Elbows, Hands, and Legs. Feet and Heels        Does the Patient have a Wound? Yes wound(s) were present on assessment. LDA wound assessment was Initiated and completed by RN       George Prevention initiated by RN: Yes  Wound Care Orders initiated by RN: No    Pressure Injury (Stage 3,4, Unstageable, DTI, NWPT, and Complex wounds) if present, place Wound referral order by RN under : No    New Ostomies, if present place, Ostomy referral order under : No     Nurse 1 eSignature: Electronically signed by Azul Barrientos RN on 3/6/25 at 5:15 PM CST    **SHARE this note so that the co-signing nurse can place an eSignature**    Nurse 2 eSignature: {Esignature:[id number]}

## 2025-03-06 NOTE — CARE COORDINATION
03/06/25 1606   Readmission Assessment   Number of Days since last admission? 8-30 days   Previous Disposition Home with Family   Who is being Interviewed Patient   What was the patient's/caregiver's perception as to why they think they needed to return back to the hospital?   (\"swelling, fluid, and breathing\")   Did you visit your Primary Care Physician after you left the hospital, before you returned this time? Yes  (3/5 saw Dr. Nuno PCP)   Did you see a specialist, such as Cardiac, Pulmonary, Orthopedic Physician, etc. after you left the hospital? No   Who advised the patient to return to the hospital? Physician   Does the patient report anything that got in the way of taking their medications? No   In our efforts to provide the best possible care to you and others like you, can you think of anything that we could have done to help you after you left the hospital the first time, so that you might not have needed to return so soon? Other (Comment)  (\"nothing\")

## 2025-03-06 NOTE — H&P
[facility]      Hospitalist - History & Physical      PCP: Nirmal Nuno MD    Date of Admission: 3/6/2025    Date of Service: 3/6/2025    Chief Complaint:  Shortness of breath    History Of Present Illness:   The patient is a 69 y.o. male with CHF, CKD, diabetes, hyperlipidemia, HTN comes to ED complaining of shortness of breath.  Patient was just recently discharged from this facility on 2/20/2025 and states that since being home he has had increased worsening dyspnea on exertion as well as a 35 pound weight gain.  Patient states that he is \"swollen everywhere.\"  He does have 4+ edema on bilateral lower extremities and his abdomen is distended and taut.  Patient also has multiple wounds on his left foot for which she is seeing Dr. Heaton with podiatry.  Patient states that they get better but when he starts to swell they \"busted open again.\"  Patient sees cardiology with Dr. Moss at [facility] and follows with Dr. Freed for CKD.  Patient does report some decreased urine output as well.  Denies fever, chest pain, nausea, vomiting, abdominal pain.    In ED: EKG atrial fibrillation with HR 85; CXR shows mild to moderate edema and small bilateral effusions, left greater than right; WBC 8.8, H&H 9/28.4, BNP 8365, creatinine 3.3, BUN 94, GFR 19, potassium 5.2.  Patient will be admitted inpatient to hospitalist.    Past Medical History:        Diagnosis Date    CHF (congestive heart failure) (HCC)     Chronic kidney disease     Diabetes mellitus (HCC)     Hyperlipidemia     Hypertension        Past Surgical History:        Procedure Laterality Date    CORONARY ARTERY BYPASS GRAFT         Home Medications:  Prior to Admission medications    Medication Sig Start Date End Date Taking? Authorizing Provider   allopurinol 200 MG TABS Take 200 mg by mouth daily 2/25/25   Day, CORI Fuentes - CNP   ferrous sulfate (IRON 325) 325 (65 Fe) MG tablet Take 1 tablet by mouth 2 times daily 2/24/25   Day, CORI Fuentes -

## 2025-03-06 NOTE — ED NOTES
ED TO INPATIENT SBAR HANDOFF    Patient Name: Cholo Bhakta   : 1955  69 y.o.   Family/Caregiver Present: Yes  Code Status Order: Prior    C-SSRS: Risk of Suicide: No Risk  Sitter No  Restraints:         Situation  Chief Complaint:   Chief Complaint   Patient presents with    Shortness of Breath     C/o SOB and fluid retention for a few days, hx of CHF     Patient Diagnosis: Acute on chronic congestive heart failure, unspecified heart failure type (HCC) [I50.9]     Brief Description of Patient's Condition: Pt presented to ED w c/o SOB and increased fluid retention. Pt having CHF exacerbation, BNP over 8,000. Given 1mg bumex IV in ED. Known wound to left foot, updated images obtained and placed in chart. VSS. A&o. 18g R AC.   Mental Status: oriented and alert  Arrived from: home    Imaging:   XR CHEST PORTABLE   Final Result   1.  Mild to moderate edema and small bilateral effusions, left greater than right.               ______________________________________    Electronically signed by: OLIVIA REDDY M.D.   Date:     2025   Time:    11:39         COVID-19 Results:   Internal Administration   First Dose      Second Dose           Last COVID Lab SARS-CoV-2, PCR (no units)   Date Value   2025 Not Detected           Abnormal labs:   Abnormal Labs Reviewed   BRAIN NATRIURETIC PEPTIDE - Abnormal; Notable for the following components:       Result Value    NT Pro-BNP 8,365 (*)     All other components within normal limits   CBC WITH AUTO DIFFERENTIAL - Abnormal; Notable for the following components:    RBC 3.08 (*)     Hemoglobin 9.0 (*)     Hematocrit 28.4 (*)     MCHC 31.7 (*)     RDW 15.4 (*)     Neutrophils % 72.0 (*)     Lymphocytes % 10.1 (*)     Eosinophils % 6.7 (*)     Lymphocytes Absolute 0.9 (*)     All other components within normal limits   COMPREHENSIVE METABOLIC PANEL - Abnormal; Notable for the following components:    Potassium 5.2 (*)     Glucose 348 (*)     BUN 94 (*)     Creatinine  Yes, please provide details:    Blood Product Administration: no  If Yes, please provide details:    Sepsis Risk Score      Admitted with Sepsis? No    Recommendation  Incomplete orders: none  Patient Belongings: w pt  Additional Comments: none  If any further questions, please call Sending RN at [phone]    Electronically signed by: Electronically signed by Humberto Bain RN on 3/6/2025 at 3:01 PM
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
## 2025-03-06 NOTE — ED NOTES
Verbal consent from patient to have wound photos obtained and placed in chart. Pictures taken of wounds to left foot and placed in pt chart.

## 2025-03-06 NOTE — ED PROVIDER NOTES
[facility] EMERGENCY DEPARTMENT  eMERGENCY dEPARTMENT eNCOUnter      Pt Name: Cholo Bhakta  MRN: [id number]  Birthdate 1955  Date of evaluation: 3/6/2025  Provider: César Moran MD    CHIEF COMPLAINT       Chief Complaint   Patient presents with    Shortness of Breath     C/o SOB and fluid retention for a few days, hx of CHF         HISTORY OF PRESENT ILLNESS   (Location/Symptom, Timing/Onset,Context/Setting, Quality, Duration, Modifying Factors, Severity)  Note limiting factors.   Cholo Bhakta is a 69 y.o. male who presents to the emergency department for evaluation regarding increased shortness of breath.  Patient states that he has felt progressively worse over the last several days.  Notes that he had increased swelling in his lower extremities.  He does have a prior history of congestive heart failure with recent inpatient hospitalization.  States he has been taking his Bumex as directed but is continue to have approximately 20+ pound weight gain since discharge.  States he also has issues with his kidneys and follows with nephrology.  He was seen by his PMD a couple of days ago and is planning to see his nephrologist next week.  He denies fevers or chills.  Has not had any episodes of acute chest pain or feelings of palpitations.  Patient is maintained on Bumex 2 mg twice daily.    Patient is not maintained on home oxygen therapy at baseline.  He also recently underwent debridement of a left foot wound and is currently supposed to be started on some oral antibiotics as prescribed by his podiatrist.    HPI    NursingNotes were reviewed.    REVIEW OF SYSTEMS    (2-9 systems for level 4, 10 or more for level 5)     Review of Systems   Constitutional:  Negative for chills and fever.   HENT:  Negative for congestion.    Respiratory:  Positive for shortness of breath. Negative for cough.    Cardiovascular:  Positive for leg swelling. Negative for palpitations.   Gastrointestinal:  Negative for abdominal pain,  glucose of 348.  BNP is elevated at 8365 which is slightly increased over his previous from prior admission.  His hemoglobin is 9.0.  He is not requiring supplemental oxygen at this time however he is very labored and short of breath when he attempts to stand at the bedside.    Given patient's 20+ pound weight gain along with worsening of his current renal function I am concerned about the possibility of discharge.  His wife reports he is having significant difficulty getting around the home secondary to shortness of breath.  I have given him a dose of IV Bumex with plans for admission to the medical service.    CONSULTS:    Case was discussed with the hospitalist team regarding plans for admission.    PROCEDURES:  Unless otherwise noted below, none     Procedures    FINAL IMPRESSION      1. Acute congestive heart failure, unspecified heart failure type (HCC)    2. Acute kidney injury superimposed on CKD          DISPOSITION/PLAN   DISPOSITION Decision To Admit 03/06/2025 02:20:21 PM   DISPOSITION CONDITION Stable       (Please note that portions of this note were completed with a voice recognition program.  Efforts were made to edit thedictations but occasionally words are mis-transcribed.)    César Moran MD (electronically signed)  Attending Emergency Physician          César Moran MD  03/06/25 6054

## 2025-03-07 ENCOUNTER — APPOINTMENT (OUTPATIENT)
Dept: CT IMAGING | Age: 70
End: 2025-03-07
Payer: MEDICARE

## 2025-03-07 ENCOUNTER — APPOINTMENT (OUTPATIENT)
Dept: ULTRASOUND IMAGING | Age: 70
End: 2025-03-07
Payer: MEDICARE

## 2025-03-07 PROBLEM — Z51.5 PALLIATIVE CARE PATIENT: Status: ACTIVE | Noted: 2025-03-07

## 2025-03-07 LAB
ALBUMIN SERPL-MCNC: 3.6 G/DL (ref 3.5–5.2)
ALP SERPL-CCNC: 133 U/L (ref 40–129)
ALT SERPL-CCNC: 37 U/L (ref 10–50)
ANION GAP SERPL CALCULATED.3IONS-SCNC: 13 MMOL/L (ref 8–16)
AST SERPL-CCNC: 25 U/L (ref 10–50)
BACTERIA URNS QL MICRO: NEGATIVE /HPF
BASOPHILS # BLD: 0.1 K/UL (ref 0–0.2)
BASOPHILS NFR BLD: 0.9 % (ref 0–1)
BILIRUB DIRECT SERPL-MCNC: 0.2 MG/DL (ref 0–0.3)
BILIRUB INDIRECT SERPL-MCNC: 0.2 MG/DL (ref 0–1)
BILIRUB SERPL-MCNC: 0.4 MG/DL (ref 0.2–1.2)
BILIRUB UR QL STRIP: NEGATIVE
BUN SERPL-MCNC: 91 MG/DL (ref 8–23)
CALCIUM SERPL-MCNC: 9.1 MG/DL (ref 8.8–10.2)
CHLORIDE SERPL-SCNC: 107 MMOL/L (ref 98–107)
CHOLEST SERPL-MCNC: 129 MG/DL (ref 0–199)
CLARITY UR: ABNORMAL
CO2 SERPL-SCNC: 22 MMOL/L (ref 22–29)
COLOR UR: YELLOW
CREAT SERPL-MCNC: 3.3 MG/DL (ref 0.7–1.2)
CREAT UR-MCNC: 38.9 MG/DL (ref 39–259)
CRYSTALS URNS MICRO: NORMAL /HPF
EOSINOPHIL # BLD: 0.8 K/UL (ref 0–0.6)
EOSINOPHIL NFR BLD: 10.1 % (ref 0–5)
EPI CELLS #/AREA URNS AUTO: 1 /HPF (ref 0–5)
ERYTHROCYTE [DISTWIDTH] IN BLOOD BY AUTOMATED COUNT: 15.4 % (ref 11.5–14.5)
GLUCOSE BLD-MCNC: 100 MG/DL (ref 70–99)
GLUCOSE BLD-MCNC: 112 MG/DL (ref 70–99)
GLUCOSE BLD-MCNC: 130 MG/DL (ref 70–99)
GLUCOSE BLD-MCNC: 154 MG/DL (ref 70–99)
GLUCOSE BLD-MCNC: 183 MG/DL (ref 70–99)
GLUCOSE BLD-MCNC: 57 MG/DL (ref 70–99)
GLUCOSE BLD-MCNC: 68 MG/DL (ref 70–99)
GLUCOSE SERPL-MCNC: 97 MG/DL (ref 70–99)
GLUCOSE UR STRIP.AUTO-MCNC: NEGATIVE MG/DL
HCT VFR BLD AUTO: 27.5 % (ref 42–52)
HDLC SERPL-MCNC: 57 MG/DL (ref 40–60)
HGB BLD-MCNC: 8.6 G/DL (ref 14–18)
HGB UR STRIP.AUTO-MCNC: NEGATIVE MG/L
HYALINE CASTS #/AREA URNS AUTO: 1 /HPF (ref 0–8)
IMM GRANULOCYTES # BLD: 0 K/UL
KETONES UR STRIP.AUTO-MCNC: NEGATIVE MG/DL
LDLC SERPL CALC-MCNC: 62 MG/DL
LEUKOCYTE ESTERASE UR QL STRIP.AUTO: NEGATIVE
LYMPHOCYTES # BLD: 1.2 K/UL (ref 1.1–4.5)
LYMPHOCYTES NFR BLD: 15.3 % (ref 20–40)
MAGNESIUM SERPL-MCNC: 2.5 MG/DL (ref 1.6–2.4)
MCH RBC QN AUTO: 28.8 PG (ref 27–31)
MCHC RBC AUTO-ENTMCNC: 31.3 G/DL (ref 33–37)
MCV RBC AUTO: 92 FL (ref 80–94)
MONOCYTES # BLD: 0.8 K/UL (ref 0–0.9)
MONOCYTES NFR BLD: 10.3 % (ref 0–10)
NEUTROPHILS # BLD: 4.9 K/UL (ref 1.5–7.5)
NEUTS SEG NFR BLD: 63 % (ref 50–65)
NITRITE UR QL STRIP.AUTO: NEGATIVE
PERFORMED ON: ABNORMAL
PH UR STRIP.AUTO: 5 [PH] (ref 5–8)
PLATELET # BLD AUTO: 224 K/UL (ref 130–400)
PMV BLD AUTO: 11.8 FL (ref 9.4–12.4)
POTASSIUM SERPL-SCNC: 4.5 MMOL/L (ref 3.5–5.1)
PROT SERPL-MCNC: 6.4 G/DL (ref 6.4–8.3)
PROT UR STRIP.AUTO-MCNC: 30 MG/DL
PROT UR-MCNC: 22 MG/DL (ref 0–12)
RBC # BLD AUTO: 2.99 M/UL (ref 4.7–6.1)
RBC #/AREA URNS AUTO: 0 /HPF (ref 0–4)
SODIUM SERPL-SCNC: 142 MMOL/L (ref 136–145)
SODIUM UR-SCNC: 69 MMOL/L
SP GR UR STRIP.AUTO: 1.01 (ref 1–1.03)
TRIGL SERPL-MCNC: 48 MG/DL (ref 0–149)
UROBILINOGEN UR STRIP.AUTO-MCNC: 0.2 E.U./DL
UUN UR-MCNC: 415 MG/DL
WBC # BLD AUTO: 7.8 K/UL (ref 4.8–10.8)
WBC #/AREA URNS AUTO: 1 /HPF (ref 0–5)

## 2025-03-07 PROCEDURE — 80061 LIPID PANEL: CPT

## 2025-03-07 PROCEDURE — 94760 N-INVAS EAR/PLS OXIMETRY 1: CPT

## 2025-03-07 PROCEDURE — 80048 BASIC METABOLIC PNL TOTAL CA: CPT

## 2025-03-07 PROCEDURE — 80076 HEPATIC FUNCTION PANEL: CPT

## 2025-03-07 PROCEDURE — 76770 US EXAM ABDO BACK WALL COMP: CPT

## 2025-03-07 PROCEDURE — 97162 PT EVAL MOD COMPLEX 30 MIN: CPT

## 2025-03-07 PROCEDURE — 84540 ASSAY OF URINE/UREA-N: CPT

## 2025-03-07 PROCEDURE — 84156 ASSAY OF PROTEIN URINE: CPT

## 2025-03-07 PROCEDURE — 74176 CT ABD & PELVIS W/O CONTRAST: CPT

## 2025-03-07 PROCEDURE — 97530 THERAPEUTIC ACTIVITIES: CPT

## 2025-03-07 PROCEDURE — 85025 COMPLETE CBC W/AUTO DIFF WBC: CPT

## 2025-03-07 PROCEDURE — 2500000003 HC RX 250 WO HCPCS

## 2025-03-07 PROCEDURE — 97165 OT EVAL LOW COMPLEX 30 MIN: CPT

## 2025-03-07 PROCEDURE — 36415 COLL VENOUS BLD VENIPUNCTURE: CPT

## 2025-03-07 PROCEDURE — 6370000000 HC RX 637 (ALT 250 FOR IP)

## 2025-03-07 PROCEDURE — 6360000002 HC RX W HCPCS

## 2025-03-07 PROCEDURE — 1200000000 HC SEMI PRIVATE

## 2025-03-07 PROCEDURE — 84300 ASSAY OF URINE SODIUM: CPT

## 2025-03-07 PROCEDURE — 82962 GLUCOSE BLOOD TEST: CPT

## 2025-03-07 PROCEDURE — 2580000003 HC RX 258

## 2025-03-07 PROCEDURE — 6370000000 HC RX 637 (ALT 250 FOR IP): Performed by: NURSE PRACTITIONER

## 2025-03-07 PROCEDURE — 83735 ASSAY OF MAGNESIUM: CPT

## 2025-03-07 PROCEDURE — 6360000002 HC RX W HCPCS: Performed by: NURSE PRACTITIONER

## 2025-03-07 PROCEDURE — 81001 URINALYSIS AUTO W/SCOPE: CPT

## 2025-03-07 PROCEDURE — 82570 ASSAY OF URINE CREATININE: CPT

## 2025-03-07 RX ORDER — SODIUM HYPOCHLORITE 1.25 MG/ML
SOLUTION TOPICAL 2 TIMES DAILY
Status: DISCONTINUED | OUTPATIENT
Start: 2025-03-07 | End: 2025-03-14 | Stop reason: HOSPADM

## 2025-03-07 RX ORDER — DOXYCYCLINE 100 MG/1
100 CAPSULE ORAL EVERY 12 HOURS SCHEDULED
Status: DISCONTINUED | OUTPATIENT
Start: 2025-03-07 | End: 2025-03-10

## 2025-03-07 RX ORDER — BUMETANIDE 0.25 MG/ML
2 INJECTION, SOLUTION INTRAMUSCULAR; INTRAVENOUS 2 TIMES DAILY
Status: DISCONTINUED | OUTPATIENT
Start: 2025-03-07 | End: 2025-03-14 | Stop reason: HOSPADM

## 2025-03-07 RX ORDER — BUSPIRONE HYDROCHLORIDE 10 MG/1
10 TABLET ORAL 3 TIMES DAILY
Status: DISCONTINUED | OUTPATIENT
Start: 2025-03-07 | End: 2025-03-14 | Stop reason: HOSPADM

## 2025-03-07 RX ADMIN — DEXTROSE MONOHYDRATE 250 ML: 10 INJECTION, SOLUTION INTRAVENOUS at 08:11

## 2025-03-07 RX ADMIN — ISOSORBIDE DINITRATE 10 MG: 10 TABLET ORAL at 21:45

## 2025-03-07 RX ADMIN — ENOXAPARIN SODIUM 30 MG: 100 INJECTION SUBCUTANEOUS at 21:40

## 2025-03-07 RX ADMIN — METOPROLOL SUCCINATE 50 MG: 50 TABLET, FILM COATED, EXTENDED RELEASE ORAL at 09:11

## 2025-03-07 RX ADMIN — ISOSORBIDE DINITRATE 10 MG: 10 TABLET ORAL at 14:37

## 2025-03-07 RX ADMIN — HYDRALAZINE HYDROCHLORIDE 10 MG: 10 TABLET ORAL at 16:40

## 2025-03-07 RX ADMIN — SUCRALFATE 1 G: 1 TABLET ORAL at 16:40

## 2025-03-07 RX ADMIN — PREGABALIN 100 MG: 50 CAPSULE ORAL at 09:10

## 2025-03-07 RX ADMIN — ONDANSETRON 4 MG: 2 INJECTION INTRAMUSCULAR; INTRAVENOUS at 13:07

## 2025-03-07 RX ADMIN — ALLOPURINOL 200 MG: 100 TABLET ORAL at 09:10

## 2025-03-07 RX ADMIN — SODIUM CHLORIDE, PRESERVATIVE FREE 10 ML: 5 INJECTION INTRAVENOUS at 08:10

## 2025-03-07 RX ADMIN — ROSUVASTATIN CALCIUM 10 MG: 20 TABLET, FILM COATED ORAL at 21:40

## 2025-03-07 RX ADMIN — MECLIZINE HYDROCHLORIDE 25 MG: 25 TABLET ORAL at 15:37

## 2025-03-07 RX ADMIN — OXYCODONE HYDROCHLORIDE 10 MG: 10 TABLET ORAL at 11:13

## 2025-03-07 RX ADMIN — BUSPIRONE HYDROCHLORIDE 10 MG: 10 TABLET ORAL at 21:40

## 2025-03-07 RX ADMIN — BUMETANIDE 1 MG: 0.25 INJECTION INTRAMUSCULAR; INTRAVENOUS at 09:11

## 2025-03-07 RX ADMIN — SODIUM CHLORIDE, PRESERVATIVE FREE 10 ML: 5 INJECTION INTRAVENOUS at 21:40

## 2025-03-07 RX ADMIN — DONEPEZIL HYDROCHLORIDE 10 MG: 5 TABLET ORAL at 09:42

## 2025-03-07 RX ADMIN — DOXYCYCLINE HYCLATE 100 MG: 100 CAPSULE ORAL at 21:40

## 2025-03-07 RX ADMIN — OXYCODONE HYDROCHLORIDE 10 MG: 10 TABLET ORAL at 21:45

## 2025-03-07 RX ADMIN — BUSPIRONE HYDROCHLORIDE 10 MG: 10 TABLET ORAL at 16:40

## 2025-03-07 RX ADMIN — HYDRALAZINE HYDROCHLORIDE 10 MG: 10 TABLET ORAL at 09:10

## 2025-03-07 RX ADMIN — SUCRALFATE 1 G: 1 TABLET ORAL at 13:07

## 2025-03-07 RX ADMIN — PREGABALIN 100 MG: 50 CAPSULE ORAL at 21:39

## 2025-03-07 RX ADMIN — PANTOPRAZOLE SODIUM 40 MG: 40 TABLET, DELAYED RELEASE ORAL at 09:11

## 2025-03-07 RX ADMIN — DAKIN'S SOLUTION 0.125% (QUARTER STRENGTH): 0.12 SOLUTION at 21:46

## 2025-03-07 RX ADMIN — DAKIN'S SOLUTION 0.125% (QUARTER STRENGTH): 0.12 SOLUTION at 16:53

## 2025-03-07 RX ADMIN — TAMSULOSIN HYDROCHLORIDE 0.4 MG: 0.4 CAPSULE ORAL at 09:10

## 2025-03-07 RX ADMIN — HYDRALAZINE HYDROCHLORIDE 10 MG: 10 TABLET ORAL at 02:28

## 2025-03-07 RX ADMIN — SUCRALFATE 1 G: 1 TABLET ORAL at 21:39

## 2025-03-07 RX ADMIN — BUMETANIDE 2 MG: 0.25 INJECTION INTRAMUSCULAR; INTRAVENOUS at 16:40

## 2025-03-07 RX ADMIN — SUCRALFATE 1 G: 1 TABLET ORAL at 09:10

## 2025-03-07 RX ADMIN — ISOSORBIDE DINITRATE 10 MG: 10 TABLET ORAL at 09:10

## 2025-03-07 RX ADMIN — CALCITRIOL CAPSULES 0.25 MCG 0.25 MCG: 0.25 CAPSULE ORAL at 09:10

## 2025-03-07 RX ADMIN — ENOXAPARIN SODIUM 30 MG: 100 INJECTION SUBCUTANEOUS at 09:10

## 2025-03-07 ASSESSMENT — PAIN SCALES - GENERAL
PAINLEVEL_OUTOF10: 7
PAINLEVEL_OUTOF10: 5

## 2025-03-07 ASSESSMENT — PAIN DESCRIPTION - DESCRIPTORS: DESCRIPTORS: ACHING

## 2025-03-07 ASSESSMENT — PAIN DESCRIPTION - LOCATION: LOCATION: BACK;NECK

## 2025-03-07 NOTE — PROGRESS NOTES
Patient complaining of increased headache, abdominal pain with nausea. Zofran and oxycodone not due at this this. Patient states \"I feel weird, my head feels weird, I just feel weird all over\". APRN notified.

## 2025-03-07 NOTE — PROGRESS NOTES
Occupational Therapy Initial Assessment  Date: 3/7/2025   Patient Name: Cholo Bhakta  MRN: [id number]     : 1955    Date of Service: 3/7/2025    Discharge Recommendations:  Continue to assess pending progress         General  Chart Reviewed: Yes  Referring Practitioner: Dr. Steve  Diagnosis: L foot ulcer    Assessment   Assessment: Pt wife present in room. Pt seen with PT.  Pt Min A x 1 sit to stand transfers from bed to recliner.  Pt at conclusion of session in recliner with feet elevated, L boot on.  Recommend ongoing OT,  pt at this time declining HH with plans to go to outpatient wound care.  Prognosis: Good  Decision Making: Low Complexity  REQUIRES OT FOLLOW-UP: Yes  Activity Tolerance  Activity Tolerance: Patient Tolerated treatment well              Patient Diagnosis(es): The primary encounter diagnosis was Acute congestive heart failure, unspecified heart failure type (HCC). A diagnosis of Acute kidney injury superimposed on CKD was also pertinent to this visit.    Past Medical History:   Past Medical History:   Diagnosis Date    CHF (congestive heart failure) (HCC)     Chronic kidney disease     Diabetes mellitus (HCC)     Hyperlipidemia     Hypertension         Past Surgical History:   Past Surgical History:   Procedure Laterality Date    CORONARY ARTERY BYPASS GRAFT            Restrictions  Restrictions/Precautions  Restrictions/Precautions: Weight Bearing  Required Braces or Orthoses?: Yes  Lower Extremity Weight Bearing Restrictions  Left Lower Extremity Weight Bearing: Weight Bearing As Tolerated  Required Braces or Orthoses  Left Lower Extremity Brace: Boot    Subjective    Pain Assessment  Pain Level: 7  Pain Location: Back;Neck  Pain Descriptors: Aching  Vital Signs  Temp: 97 °F (36.1 °C)  Temp Source: Temporal  Pulse: 76  Heart Rate Source: Monitor  Respirations: 18  BP: 117/71  MAP (Calculated): 86  MAP (mmHg): 86  BP Location: Left upper arm  BP Method: Automatic  Patient Position:

## 2025-03-07 NOTE — PROGRESS NOTES
Spiritual Health History and Assessment/Progress Note  [facility]    (P) Spiritual/Emotional Needs, (P) Emotional distress, Relationship concerns, (P) Adjustment to illness, Life Adjustments,      Name: Cholo Bhakta MRN: [id number]    Age: 69 y.o.     Sex: male   Language: English   Jew: [employer]ism   Acute on chronic congestive heart failure, unspecified heart failure type (HCC)     Date: 3/6/2025            Total Time Calculated: (P) 30 min              Spiritual Assessment began in [facility] ONCOLOGY UNIT        Referral/Consult From: (P) Multi-disciplinary team   Encounter Overview/Reason: (P) Spiritual/Emotional Needs  Service Provided For: (P) Patient and family together    Ana María, Belief, Meaning:   Patient identifies as spiritual  Family/Friends are connected with a ana maría tradition or spiritual practice      Importance and Influence:  Patient has spiritual/personal beliefs that influence decisions regarding their health  Family/Friends have spiritual/personal beliefs that influence decisions regarding the patient's health    Community:  Patient is connected with a spiritual community  Family/Friends feel well-supported. Support system includes: Spouse/Partner, Children, and Ana María Community    Assessment and Plan of Care:     Patient Interventions include: Facilitated expression of thoughts and feelings  Family/Friends Interventions include: Affirmed coping skills/support systems    Patient Plan of Care: No spiritual needs identified for follow-up  Family/Friends Plan of Care: No future visits per patient/family request    Electronically signed by Chaplain Nicole on 3/6/2025 at 8:15 PM        03/06/25 2009   Encounter Summary   Encounter Overview/Reason Spiritual/Emotional Needs   Encounter Code  Assessment by  services   Service Provided For Patient and family together   Referral/Consult From Multi-disciplinary team   Support System Spouse;Children;Adventism/ana maría community
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
## 2025-03-07 NOTE — PROGRESS NOTES
[facility]  Nurse  Consult Note       NAME:  Cholo Bhakta  MEDICAL RECORD NUMBER:  [id number]  AGE: 69 y.o.   GENDER: male  : 1955  TODAY'S DATE:  3/7/2025    Subjective   Reason for Wound Nurse Evaluation and Assessment: diabetic foot ulcers to left foot      Cholo Bhakta is a 69 y.o. male referred by:   [] Physician  [x] Nursing  [] Other:     Wound Identification:  Wound Type: diabetic  Contributing Factors: diabetes and poor glucose control    Wound History: Patient presented to the hospital with diabetic wounds to his left plantar foot. These wounds are currently being followed by Dr. Heaton at [facility].     Both wound beds are pink/red with a small amount of serosanguinous drainage. There is a mild odor noted. Will continue current orders from [facility].           Current Wound Care Treatment:        LEFT FOOT: Cleanse wounds with soap and water with each dressing change. Apply Dakins moistened gauze to only the wound bed of both wounds on the left foot. Cover with 4x4 fluffs and secure with roll gauze. Change twice daily and PRN       Patient Goal of Care:  [x] Wound Healing  [] Odor Control  [] Palliative Care  [] Pain Control   [] Other:         PAST MEDICAL HISTORY        Diagnosis Date    CHF (congestive heart failure) (HCC)     Chronic kidney disease     Diabetes mellitus (HCC)     Hyperlipidemia     Hypertension     Palliative care patient 2025       PAST SURGICAL HISTORY    Past Surgical History:   Procedure Laterality Date    CORONARY ARTERY BYPASS GRAFT         FAMILY HISTORY    History reviewed. No pertinent family history.    SOCIAL HISTORY    Social History     Tobacco Use    Smoking status: Never    Smokeless tobacco: Never   Substance Use Topics    Alcohol use: Not Currently    Drug use: Never       ALLERGIES    Allergies   Allergen Reactions    Cefepime Anaphylaxis, Hives and Itching    Bactrim [Sulfamethoxazole-Trimethoprim]     Metronidazole Rash  gauze;Pharmaceutical agent (see MAR) 02/24/25 1130   Wound Assessment Pink/red 02/24/25 1130   Drainage Amount None (dry) 02/24/25 1130   Drainage Description Thin 02/23/25 2031   Odor None 02/23/25 2031   Nicole-wound Assessment Intact;Hyperkeratosis (callous) 02/23/25 2031   Number of days: 13            Response to treatment:  Well tolerated by patient.     Pain Assessment:  Severity:  0 / 10  Quality of pain: N/A  Wound Pain Timing/Severity: none  Premedicated: N/A    Plan   Plan of Care:      Specialty Bed Required : N/A   [] Low Air Loss   [] Pressure Redistribution  [] Fluid Immersion  [] Bariatric  [] Total Pressure Relief  [] Other:     Current Diet: ADULT DIET; Regular; No Added Salt (3-4 gm)  Dietician consult:  N/A    Discharge Plan:  Placement for patient upon discharge: home with support    Patient appropriate for Outpatient Wound Care Center: Yes    Referrals:  []   [] Home Health Care  [] Supplies  [] Other    Patient/Caregiver Teaching:  Level of patient/caregiver understanding able to:   [] Indicates understanding       [] Needs reinforcement  [] Unsuccessful      [x] Verbal Understanding  [] Demonstrated understanding       [] No evidence of learning  [] Refused teaching         [] N/A       Electronically signed by   Roselyn Garcia RN 3/7/2025

## 2025-03-07 NOTE — PROGRESS NOTES
Physical Therapy  Facility/Department: [facility] ONCOLOGY UNIT  Physical Therapy Initial Assessment    Name: Cholo Bhakta  : 1955  MRN: [id number]  Date of Service: 3/7/2025    Discharge Recommendations:  Continue to assess pending progress, 24 hour supervision or assist, Patient would benefit from continued therapy after discharge          Patient Diagnosis(es): The primary encounter diagnosis was Acute congestive heart failure, unspecified heart failure type (HCC). A diagnosis of Acute kidney injury superimposed on CKD was also pertinent to this visit.  Past Medical History:  has a past medical history of CHF (congestive heart failure) (HCC), Chronic kidney disease, Diabetes mellitus (HCC), Hyperlipidemia, Hypertension, and Palliative care patient.  Past Surgical History:  has a past surgical history that includes Coronary artery bypass graft.    Assessment  Body Structures, Functions, Activity Limitations Requiring Skilled Therapeutic Intervention: Decreased functional mobility ;Decreased ADL status;Decreased ROM;Decreased strength;Decreased balance;Decreased endurance;Decreased posture  Assessment: Pt.will benefit from cont.PT to decrease impairments. Pt. a fall risk and should not attempt mobility on his own. Pt. needs 24 hr care. Pt. needs to wear L foot boot. Pt. states he is supposed to try to stay off L foot. Pt. able to transfer to chair with Min A.  Treatment Diagnosis: impaired gait and mobility  Therapy Prognosis: Good  Decision Making: Medium Complexity  Barriers to Learning: none noted  Requires PT Follow-Up: Yes  Activity Tolerance  Activity Tolerance: Patient limited by fatigue;Patient limited by pain    Plan  Physical Therapy Plan  General Plan: 5-7 times per week  Therapy Duration: 2 Weeks  Current Treatment Recommendations: Strengthening, ROM, Balance training, Functional mobility training, Transfer training, Endurance training, Gait training, Patient/Caregiver education & training, Safety  accesible  Vision/Hearing  Hearing  Hearing: Within functional limits    Cognition   Orientation  Overall Orientation Status: Within Functional Limits  Cognition  Overall Cognitive Status: WFL    Objective  Temp: 97 °F (36.1 °C)  Pulse: 76  Heart Rate Source: Monitor  Respirations: 18  SpO2: 97 %  O2 Device: None (Room air)  BP: 117/71  MAP (Calculated): 86  BP Location: Left upper arm  BP Method: Automatic  Patient Position: Supine     Observation/Palpation  Posture: Fair  Observation: sores to bottom of L foot, donned boot on L foot  Gross Assessment  Sensation: Impaired (neuropathy B feet)  Pain  Pre-Pain:  (no c/o pain)    AROM RLE (degrees)  RLE AROM: Exceptions  RLE General AROM: 0-90 knee and hip, ankle to neutral DF  AROM LLE (degrees)  LLE AROM : Exceptions  LLE General AROM: 0-90 knee and hip, ankle to neutral DF, L hip in ER  Strength RLE  Strength RLE: Exception  Comment: 3-/5  Strength LLE  Strength LLE: Exception  Comment: 3-/5        Bed Mobility Training  Bed Mobility Training: Yes  Balance  Sitting: Intact  Standing: Impaired  Standing - Static: Fair  Standing - Dynamic: Fair  Transfer Training  Transfer Training: Yes  Overall Level of Assistance: Minimal assistance  Sit to Stand: Minimal assistance  Stand to Sit: Minimal assistance  Bed mobility  Supine to Sit: Partial/Moderate assistance  Sit to Supine: Partial/Moderate assistance  Scooting: Minimal assistance  Transfers  Sit to Stand: Minimal Assistance;2 Person Assistance  Stand to Sit: Minimal Assistance;2 Person Assistance  Bed to Chair: Minimal assistance;2 Person Assistance  Ambulation  WB Status: WBAT LLE  Ambulation  Surface: Level tile  Device: No Device;Hand-Held Assist (HHAx2)  Assistance: 2 Person assistance;Minimal assistance  Quality of Gait: unsteady  Gait Deviations: Decreased step length;Decreased step height;Staggers  Distance: 2'     Balance  Posture: Fair  Sitting - Static: Fair;+  Sitting - Dynamic: Fair;-  Standing - Static:  Poor;+  Standing - Dynamic: Poor;+          OutComes Score                                                  AM-PAC - Mobility              Tinneti Score       Goals  Short Term Goals  Time Frame for Short Term Goals: 2 wks  Short Term Goal 1: supine to sit indep  Short Term Goal 2: sit to stand indep  Short Term Goal 3: amb. 25' with RW SBA  Short Term Goal 4: bed to chair SBA  Patient Goals   Patient Goals : go home       Education  Patient Education  Education Given To: Patient  Education Provided: Role of Therapy;Plan of Care;Transfer Training;Family Education;Fall Prevention Strategies  Education Method: Demonstration;Verbal  Barriers to Learning: None  Education Outcome: Verbalized understanding;Demonstrated understanding;Continued education needed      Therapy Time   Individual Concurrent Group Co-treatment   Time In           Time Out           Minutes                   Clarissa Be, PT     Electronically signed by Clarissa Be, PT on 3/7/2025 at 2:20 PM

## 2025-03-07 NOTE — PROGRESS NOTES
[facility]      Patient:  Cholo Bhakta  YOB: 1955  Date of Service: 3/8/2025  MRN: [id number]   Acct: [id number]   Primary Care Physician: Nirmal Nuno MD  Advance Directive: Full Code  Admit Date: 3/6/2025       Hospital Day: 2  Portions of this note have been copied forward, however, changed to reflect the most current clinical status of this patient.    CHIEF COMPLAINT Shortness of breath    SUBJECTIVE:  Shortness of breath unchanged. His wife at bedside is concerned about his foot wound.  Afebrile, vital signs stable, on room air.    CUMULATIVE HOSPITAL STAY:  The patient is a 70 yo male with a PMH of HTN, CAD, HFrEF, CKD stage IV, type 2 diabetes, and diabetic foot wound who presented to [facility] ED on 3/6/2025 with complaints of shortness of breath.  He reported increased dyspnea on exertion as well as a 35 pound weight gain since his discharge from this facility on 2/24/2025.  He reported worsening edema to his lower abdomen and bilateral lower extremities.  He additionally reported that he had multiple wounds to his left foot.  He is established with Dr. Heaton, podiatry, and [facility] and recently underwent a wound debridement.  He and his wife reported that his wound would begin to improve and then he will experience edema again his left foot wound more open.  He reported decreased urine output.  He denied fever, chills, chest pain, palpitations, nausea or shortness of breath.  Further ED workup revealed , K5.2, , CO2 23, BUN 94 and creatinine 3.3.  proBNP 8365.  WBC 8.8, H&H 9.0/28.4 with a platelet count of 209.  Chest x-ray mild to moderate edema bilateral small effusions left greater than right.  He was admitted to hospital medicine for cardiorenal syndrome with volume overload.  He was placed on IV diuretics.  Net -1975.  His renal functions remain relatively unchanged BUN 91 creatinine 3.3.  Bumex increased to 2 mg IV every 12 hours.  Nephrology has been consulted due  abdominal tenderness.   Musculoskeletal:      Cervical back: Normal range of motion and neck supple.      Right lower leg: Edema present.      Left lower leg: Edema present.   Skin:     General: Skin is warm.      Findings: Erythema and wound present.             Comments: Mild erythema noted to the left ankle area   Neurological:      General: No focal deficit present.      Mental Status: He is alert and oriented to person, place, and time.      Motor: Weakness present.             Medications:      sodium chloride      dextrose        bumetanide  2 mg IntraVENous BID    sodium hypochlorite   Irrigation BID    doxycycline hyclate  100 mg Oral 2 times per day    busPIRone  10 mg Oral TID    allopurinol  200 mg Oral Daily    [Held by provider] bumetanide  2 mg Oral BID    calcitRIOL  0.25 mcg Oral Daily    donepezil  10 mg Oral Daily    hydrALAZINE  10 mg Oral q8h    insulin glargine  50 Units SubCUTAneous Nightly    isosorbide dinitrate  10 mg Oral TID    metoprolol succinate  50 mg Oral Daily    pantoprazole  40 mg Oral Daily    pregabalin  100 mg Oral BID    rosuvastatin  10 mg Oral Nightly    tamsulosin  0.4 mg Oral Daily    sucralfate  1 g Oral 4x Daily    sodium chloride flush  5-40 mL IntraVENous 2 times per day    enoxaparin  30 mg SubCUTAneous BID    insulin lispro  0-4 Units SubCUTAneous 4x Daily AC & HS     oxyCODONE HCl, meclizine, sodium chloride flush, sodium chloride, ondansetron **OR** ondansetron, polyethylene glycol, acetaminophen **OR** acetaminophen, potassium chloride **OR** potassium alternative oral replacement **OR** potassium chloride, magnesium sulfate, glucose, dextrose bolus **OR** dextrose bolus, glucagon (rDNA), dextrose  ADULT DIET; Regular; No Added Salt (3-4 gm)     Lab and other Data:     Recent Labs     03/06/25  1051 03/07/25  0138   WBC 8.8 7.8   HGB 9.0* 8.6*    224     Recent Labs     03/06/25  1051 03/07/25  0138 03/08/25  0204    142 142   K 5.2* 4.5 4.5

## 2025-03-07 NOTE — PROGRESS NOTES
Advance Care Planning     Advance Care Planning Inpatient Note  Spiritual Care Department    Today's Date: 3/6/2025  Unit: [facility] 4 ONCOLOGY UNIT    Received request from rounding.  Upon review of chart and communication with care team, patient's decision making abilities are not in question.. Patient and Spouse was/were present in the room during visit.    Goals of ACP Conversation:  Facilitate a discussion related to patient's goals of care as they align with the patient's values and beliefs.    Health Care Decision Makers:       Primary Decision Maker: Dee Bhakta - Spouse - [phone]  Summary:  The patient was raised in a [place] background and he uses his ajay as the source of his strength to go through this challenging times.  He has three kids and they are all close to him and communicate with him often.    Advance Care Planning Documents (Patient Wishes):  None     Assessment:  The patient was relied upon his wifes help and his spiritual strength to go through this challenges.  His connection with his Jain family provides his connection and belonging and sense of confidence in the present situation.    Interventions:  Encouraged ongoing ACP conversation with future decision makers and loved ones    Care Preferences Communicated:   No    Outcomes/Plan:  The patient would like to keep the same person in his decision making file for the future use.    Electronically signed by Esperanza Rivera on 3/6/2025 at 8:21 PM

## 2025-03-08 ENCOUNTER — APPOINTMENT (OUTPATIENT)
Age: 70
End: 2025-03-08
Attending: INTERNAL MEDICINE
Payer: MEDICARE

## 2025-03-08 LAB
25(OH)D3 SERPL-MCNC: 18.7 NG/ML
ANION GAP SERPL CALCULATED.3IONS-SCNC: 12 MMOL/L (ref 8–16)
BASOPHILS # BLD: 0.1 K/UL (ref 0–0.2)
BASOPHILS NFR BLD: 0.9 % (ref 0–1)
BNP BLD-MCNC: 6126 PG/ML (ref 0–124)
BUN SERPL-MCNC: 84 MG/DL (ref 8–23)
CALCIUM SERPL-MCNC: 9 MG/DL (ref 8.8–10.2)
CHLORIDE SERPL-SCNC: 106 MMOL/L (ref 98–107)
CO2 SERPL-SCNC: 24 MMOL/L (ref 22–29)
CREAT SERPL-MCNC: 3 MG/DL (ref 0.7–1.2)
ECHO BSA: 2.63 M2
ECHO LA DIAMETER INDEX: 1.86 CM/M2
ECHO LA DIAMETER: 4.7 CM
ECHO LV EDV A2C: 174 ML
ECHO LV EDV A4C: 170 ML
ECHO LV EDV INDEX A4C: 67 ML/M2
ECHO LV EDV NDEX A2C: 69 ML/M2
ECHO LV EF PHYSICIAN: 45 %
ECHO LV EJECTION FRACTION A2C: 43 %
ECHO LV EJECTION FRACTION A4C: 43 %
ECHO LV EJECTION FRACTION BIPLANE: 42 % (ref 55–100)
ECHO LV ESV A2C: 100 ML
ECHO LV ESV A4C: 98 ML
ECHO LV ESV INDEX A2C: 40 ML/M2
ECHO LV ESV INDEX A4C: 39 ML/M2
ECHO LV FRACTIONAL SHORTENING: 27 % (ref 28–44)
ECHO LV INTERNAL DIMENSION DIASTOLE INDEX: 2.06 CM/M2
ECHO LV INTERNAL DIMENSION DIASTOLIC: 5.2 CM (ref 4.2–5.9)
ECHO LV INTERNAL DIMENSION SYSTOLIC INDEX: 1.5 CM/M2
ECHO LV INTERNAL DIMENSION SYSTOLIC: 3.8 CM
ECHO LV IVSD: 1.3 CM (ref 0.6–1)
ECHO LV MASS 2D: 278.4 G (ref 88–224)
ECHO LV MASS INDEX 2D: 110.1 G/M2 (ref 49–115)
ECHO LV POSTERIOR WALL DIASTOLIC: 1.3 CM (ref 0.6–1)
ECHO LV RELATIVE WALL THICKNESS RATIO: 0.5
ECHO RV INTERNAL DIMENSION: 3.8 CM
EOSINOPHIL # BLD: 0.7 K/UL (ref 0–0.6)
EOSINOPHIL NFR BLD: 10.4 % (ref 0–5)
ERYTHROCYTE [DISTWIDTH] IN BLOOD BY AUTOMATED COUNT: 15.4 % (ref 11.5–14.5)
GLUCOSE BLD-MCNC: 138 MG/DL (ref 70–99)
GLUCOSE BLD-MCNC: 178 MG/DL (ref 70–99)
GLUCOSE BLD-MCNC: 224 MG/DL (ref 70–99)
GLUCOSE BLD-MCNC: 228 MG/DL (ref 70–99)
GLUCOSE SERPL-MCNC: 221 MG/DL (ref 70–99)
HCT VFR BLD AUTO: 27.1 % (ref 42–52)
HGB BLD-MCNC: 8.3 G/DL (ref 14–18)
IMM GRANULOCYTES # BLD: 0 K/UL
LYMPHOCYTES # BLD: 0.9 K/UL (ref 1.1–4.5)
LYMPHOCYTES NFR BLD: 13.3 % (ref 20–40)
MAGNESIUM SERPL-MCNC: 2.4 MG/DL (ref 1.6–2.4)
MCH RBC QN AUTO: 29 PG (ref 27–31)
MCHC RBC AUTO-ENTMCNC: 30.6 G/DL (ref 33–37)
MCV RBC AUTO: 94.8 FL (ref 80–94)
MONOCYTES # BLD: 0.6 K/UL (ref 0–0.9)
MONOCYTES NFR BLD: 9.8 % (ref 0–10)
NEUTROPHILS # BLD: 4.3 K/UL (ref 1.5–7.5)
NEUTS SEG NFR BLD: 65.4 % (ref 50–65)
PERFORMED ON: ABNORMAL
PHOSPHATE SERPL-MCNC: 5.1 MG/DL (ref 2.5–4.5)
PLATELET # BLD AUTO: 223 K/UL (ref 130–400)
PMV BLD AUTO: 12.7 FL (ref 9.4–12.4)
POTASSIUM SERPL-SCNC: 4.5 MMOL/L (ref 3.5–5.1)
PTH-INTACT SERPL-MCNC: 84.3 PG/ML (ref 15–65)
RBC # BLD AUTO: 2.86 M/UL (ref 4.7–6.1)
SODIUM SERPL-SCNC: 142 MMOL/L (ref 136–145)
WBC # BLD AUTO: 6.5 K/UL (ref 4.8–10.8)

## 2025-03-08 PROCEDURE — 82306 VITAMIN D 25 HYDROXY: CPT

## 2025-03-08 PROCEDURE — 6360000004 HC RX CONTRAST MEDICATION: Performed by: INTERNAL MEDICINE

## 2025-03-08 PROCEDURE — 83880 ASSAY OF NATRIURETIC PEPTIDE: CPT

## 2025-03-08 PROCEDURE — 82962 GLUCOSE BLOOD TEST: CPT

## 2025-03-08 PROCEDURE — 6370000000 HC RX 637 (ALT 250 FOR IP): Performed by: NURSE PRACTITIONER

## 2025-03-08 PROCEDURE — 94760 N-INVAS EAR/PLS OXIMETRY 1: CPT

## 2025-03-08 PROCEDURE — 93308 TTE F-UP OR LMTD: CPT | Performed by: INTERNAL MEDICINE

## 2025-03-08 PROCEDURE — 1200000000 HC SEMI PRIVATE

## 2025-03-08 PROCEDURE — 85025 COMPLETE CBC W/AUTO DIFF WBC: CPT

## 2025-03-08 PROCEDURE — 36415 COLL VENOUS BLD VENIPUNCTURE: CPT

## 2025-03-08 PROCEDURE — C8924 2D TTE W OR W/O FOL W/CON,FU: HCPCS

## 2025-03-08 PROCEDURE — 2500000003 HC RX 250 WO HCPCS

## 2025-03-08 PROCEDURE — 6370000000 HC RX 637 (ALT 250 FOR IP)

## 2025-03-08 PROCEDURE — 6370000000 HC RX 637 (ALT 250 FOR IP): Performed by: INTERNAL MEDICINE

## 2025-03-08 PROCEDURE — 6360000002 HC RX W HCPCS

## 2025-03-08 PROCEDURE — 80048 BASIC METABOLIC PNL TOTAL CA: CPT

## 2025-03-08 PROCEDURE — 83735 ASSAY OF MAGNESIUM: CPT

## 2025-03-08 PROCEDURE — 83970 ASSAY OF PARATHORMONE: CPT

## 2025-03-08 PROCEDURE — 84100 ASSAY OF PHOSPHORUS: CPT

## 2025-03-08 PROCEDURE — 6360000002 HC RX W HCPCS: Performed by: NURSE PRACTITIONER

## 2025-03-08 PROCEDURE — 99222 1ST HOSP IP/OBS MODERATE 55: CPT | Performed by: INTERNAL MEDICINE

## 2025-03-08 RX ORDER — ERGOCALCIFEROL 1.25 MG/1
50000 CAPSULE, LIQUID FILLED ORAL WEEKLY
Status: DISCONTINUED | OUTPATIENT
Start: 2025-03-08 | End: 2025-03-14 | Stop reason: HOSPADM

## 2025-03-08 RX ORDER — INSULIN LISPRO 100 [IU]/ML
0-8 INJECTION, SOLUTION INTRAVENOUS; SUBCUTANEOUS
Status: DISCONTINUED | OUTPATIENT
Start: 2025-03-08 | End: 2025-03-14 | Stop reason: HOSPADM

## 2025-03-08 RX ORDER — DOCUSATE SODIUM 100 MG/1
100 CAPSULE, LIQUID FILLED ORAL 2 TIMES DAILY
Status: DISCONTINUED | OUTPATIENT
Start: 2025-03-08 | End: 2025-03-09

## 2025-03-08 RX ADMIN — TAMSULOSIN HYDROCHLORIDE 0.4 MG: 0.4 CAPSULE ORAL at 10:59

## 2025-03-08 RX ADMIN — INSULIN LISPRO 4 UNITS: 100 INJECTION, SOLUTION INTRAVENOUS; SUBCUTANEOUS at 21:03

## 2025-03-08 RX ADMIN — BUMETANIDE 2 MG: 0.25 INJECTION INTRAMUSCULAR; INTRAVENOUS at 10:59

## 2025-03-08 RX ADMIN — SULFUR HEXAFLUORIDE 2 ML: 60.7; .19; .19 INJECTION, POWDER, LYOPHILIZED, FOR SUSPENSION INTRAVENOUS; INTRAVESICAL at 15:52

## 2025-03-08 RX ADMIN — INSULIN LISPRO 2 UNITS: 100 INJECTION, SOLUTION INTRAVENOUS; SUBCUTANEOUS at 17:26

## 2025-03-08 RX ADMIN — HYDRALAZINE HYDROCHLORIDE 10 MG: 10 TABLET ORAL at 16:33

## 2025-03-08 RX ADMIN — DOXYCYCLINE HYCLATE 100 MG: 100 CAPSULE ORAL at 21:03

## 2025-03-08 RX ADMIN — SUCRALFATE 1 G: 1 TABLET ORAL at 11:00

## 2025-03-08 RX ADMIN — DOCUSATE SODIUM 100 MG: 100 CAPSULE, LIQUID FILLED ORAL at 11:08

## 2025-03-08 RX ADMIN — INSULIN GLARGINE 50 UNITS: 100 INJECTION, SOLUTION SUBCUTANEOUS at 21:03

## 2025-03-08 RX ADMIN — DOXYCYCLINE HYCLATE 100 MG: 100 CAPSULE ORAL at 10:59

## 2025-03-08 RX ADMIN — ISOSORBIDE DINITRATE 10 MG: 10 TABLET ORAL at 16:33

## 2025-03-08 RX ADMIN — APIXABAN 5 MG: 5 TABLET, FILM COATED ORAL at 14:25

## 2025-03-08 RX ADMIN — ALLOPURINOL 200 MG: 100 TABLET ORAL at 11:00

## 2025-03-08 RX ADMIN — SUCRALFATE 1 G: 1 TABLET ORAL at 14:25

## 2025-03-08 RX ADMIN — ISOSORBIDE DINITRATE 10 MG: 10 TABLET ORAL at 21:04

## 2025-03-08 RX ADMIN — SUCRALFATE 1 G: 1 TABLET ORAL at 21:04

## 2025-03-08 RX ADMIN — ONDANSETRON 4 MG: 2 INJECTION INTRAMUSCULAR; INTRAVENOUS at 11:08

## 2025-03-08 RX ADMIN — POLYETHYLENE GLYCOL 3350 17 G: 17 POWDER, FOR SOLUTION ORAL at 21:51

## 2025-03-08 RX ADMIN — BUSPIRONE HYDROCHLORIDE 10 MG: 10 TABLET ORAL at 10:59

## 2025-03-08 RX ADMIN — DAKIN'S SOLUTION 0.125% (QUARTER STRENGTH): 0.12 SOLUTION at 21:05

## 2025-03-08 RX ADMIN — SUCRALFATE 1 G: 1 TABLET ORAL at 16:33

## 2025-03-08 RX ADMIN — HYDRALAZINE HYDROCHLORIDE 10 MG: 10 TABLET ORAL at 02:44

## 2025-03-08 RX ADMIN — BUSPIRONE HYDROCHLORIDE 10 MG: 10 TABLET ORAL at 21:05

## 2025-03-08 RX ADMIN — DONEPEZIL HYDROCHLORIDE 10 MG: 5 TABLET ORAL at 11:00

## 2025-03-08 RX ADMIN — ERGOCALCIFEROL 50000 UNITS: 1.25 CAPSULE ORAL at 11:08

## 2025-03-08 RX ADMIN — OXYCODONE HYDROCHLORIDE 10 MG: 10 TABLET ORAL at 22:48

## 2025-03-08 RX ADMIN — OXYCODONE HYDROCHLORIDE 10 MG: 10 TABLET ORAL at 11:08

## 2025-03-08 RX ADMIN — CALCITRIOL CAPSULES 0.25 MCG 0.25 MCG: 0.25 CAPSULE ORAL at 11:00

## 2025-03-08 RX ADMIN — METOPROLOL SUCCINATE 50 MG: 50 TABLET, FILM COATED, EXTENDED RELEASE ORAL at 10:59

## 2025-03-08 RX ADMIN — PREGABALIN 100 MG: 50 CAPSULE ORAL at 11:00

## 2025-03-08 RX ADMIN — APIXABAN 5 MG: 5 TABLET, FILM COATED ORAL at 21:04

## 2025-03-08 RX ADMIN — SODIUM CHLORIDE, PRESERVATIVE FREE 10 ML: 5 INJECTION INTRAVENOUS at 21:05

## 2025-03-08 RX ADMIN — DOCUSATE SODIUM 100 MG: 100 CAPSULE, LIQUID FILLED ORAL at 21:04

## 2025-03-08 RX ADMIN — ROSUVASTATIN CALCIUM 10 MG: 20 TABLET, FILM COATED ORAL at 21:03

## 2025-03-08 RX ADMIN — PREGABALIN 100 MG: 50 CAPSULE ORAL at 21:04

## 2025-03-08 RX ADMIN — SODIUM CHLORIDE, PRESERVATIVE FREE 10 ML: 5 INJECTION INTRAVENOUS at 11:00

## 2025-03-08 RX ADMIN — DAKIN'S SOLUTION 0.125% (QUARTER STRENGTH): 0.12 SOLUTION at 10:59

## 2025-03-08 RX ADMIN — BUMETANIDE 2 MG: 0.25 INJECTION INTRAMUSCULAR; INTRAVENOUS at 17:26

## 2025-03-08 RX ADMIN — ENOXAPARIN SODIUM 30 MG: 100 INJECTION SUBCUTANEOUS at 10:59

## 2025-03-08 RX ADMIN — IRON SUCROSE 200 MG: 20 INJECTION, SOLUTION INTRAVENOUS at 16:33

## 2025-03-08 ASSESSMENT — PAIN - FUNCTIONAL ASSESSMENT: PAIN_FUNCTIONAL_ASSESSMENT: PREVENTS OR INTERFERES SOME ACTIVE ACTIVITIES AND ADLS

## 2025-03-08 ASSESSMENT — PAIN DESCRIPTION - DESCRIPTORS
DESCRIPTORS: DISCOMFORT
DESCRIPTORS: ACHING

## 2025-03-08 ASSESSMENT — PAIN DESCRIPTION - LOCATION
LOCATION: NECK;BACK
LOCATION: BACK;HEAD;NECK

## 2025-03-08 ASSESSMENT — PAIN SCALES - GENERAL
PAINLEVEL_OUTOF10: 7
PAINLEVEL_OUTOF10: 7

## 2025-03-08 NOTE — CONSULTS
[facility]  Cardiology Consult      Requesting MD:  Sarbjit Steve MD   Admit Status:         History obtained from:   [] Patient  [] Other (specify):     PROBLEM LIST:    Patient Active Problem List    Diagnosis Date Noted    Palliative care patient 03/07/2025    Acute on chronic congestive heart failure, unspecified heart failure type (HCC) 03/06/2025    Gout 03/06/2025     Assessment & Plan Note:            Memory loss 03/06/2025    GERD (gastroesophageal reflux disease) 03/06/2025    BPH (benign prostatic hyperplasia) 03/06/2025    Neuropathy 03/06/2025    Diabetes mellitus (HCC) 02/22/2025    CHF, left ventricular (HCC) 02/21/2025    Acute on chronic systolic heart failure (HCC) 02/21/2025    Volume overload 02/21/2025    CKD (chronic kidney disease) stage 4, GFR 15-29 ml/min ([facility]) 02/21/2025    Cardiorenal syndrome 02/21/2025    Chronic obstructive pulmonary disease ([facility]) 12/03/2024    CAD (coronary artery disease) 01/20/2023    Diabetic ulcer of left foot associated with type 2 diabetes mellitus (HCC) 08/31/2020    Essential hypertension 05/03/2017     1.  Coronary artery disease, prior CABG 9/2015 with four-vessel grafting, last catheterization 2/8/2021 with occlusive native vessel disease, patent grafts with LIMA to LAD, SVG to diagonal, SVG to OM, SVG to RCA, prior LV ejection fraction reported at 50%, drop in EF 1/8/2025 to 35-40% (all records from [facility]).  2.  Morbid obesity.  3.  Diabetes mellitus type 2 with diabetic foot ulcer.  4.  CKD stage IV.  5.  Chronic atrial fibrillation on Eliquis.  6.  Remote tobacco use quit 1991.    PRESENTATION: Cholo Bhakta is a 69 y.o. year old male who presents with shortness of breath with volume retention and edema.  Recently admitted 2/21/2025 and discharged 2/24/2025.  Readmitted 3/6/2025.  Followed at [facility] by Dr. Moss.  Recent echo with reduced EF at [facility] reported at 35-40%.  Prior echoes had reported EF at 50 to 55%.  Creatinine  gallop.      Comments: No significant jugular venous distention noted  1-2+ pitting edema    Pulmonary:      Effort: No respiratory distress.      Breath sounds: No stridor. Rales present. No wheezing.      Comments: Very few right basal crepitations noted  Abdominal:      General: Bowel sounds are normal. There is no distension.      Palpations: Abdomen is soft. There is no mass.      Tenderness: There is no abdominal tenderness. There is no guarding or rebound.      Comments: No palpable organomegaly   Musculoskeletal:         General: No deformity.   Skin:     General: Skin is warm.      Coloration: Skin is not pale.      Findings: No erythema or rash.   Neurological:      Mental Status: He is alert and oriented to person, place, and time.      Motor: No abnormal muscle tone.      Coordination: Coordination normal.      Deep Tendon Reflexes: Reflexes normal.           Labs:  Recent Labs     03/06/25  1051 03/07/25  0138 03/08/25  0654   WBC 8.8 7.8 6.5   HGB 9.0* 8.6* 8.3*    224 223       Recent Labs     03/06/25  1051 03/07/25  0138 03/08/25  0204    142 142   K 5.2* 4.5 4.5    107 106   CO2 23 22 24   BUN 94* 91* 84*   CREATININE 3.3* 3.3* 3.0*   LABGLOM 19* 19* 22*   MG  --  2.5* 2.4   CALCIUM 9.0 9.1 9.0   PHOS  --   --  5.1*       CK, CKMB, Troponin: @LABRCNT (CKTOTAL:3, CKMB:3, TROPONINI:3)@    Last 3 BNP:          IMAGING:  CT ABDOMEN PELVIS WO CONTRAST Additional Contrast? None  Result Date: 3/7/2025  EXAM: CT ABDOMEN AND PELVIS WITHOUT CONTRAST  HISTORY: Abdominal pain and nausea  TECHNIQUE: CT acquisition of the abdomen and pelvis from the lower thorax through the pelvis without IV contrast administration.  2-D coronal and sagittal reformatted images were obtained from the axial source images. Oral Contrast: None.   COMPARISON: None.  FINDINGS: Please note that evaluation of the solid organs is limited on noncontrast study.  Lower Thorax: Moderate dependently layering bilateral  pleural effusions with adjacent bilateral lower lobe compressive atelectasis.  Peripheral calcified granuloma of the right lower lobe.  Chronic right-sided rib fractures.  Multivessel coronary calcifications.  Median sternotomy wires.  Liver: Normal size and contour. Normal overall attenuation. Gallbladder: Surgically absent. Bile ducts: No intra- or extrahepatic biliary ductal dilatation. Pancreas: No mass or evidence of pancreatitis. No duct dilation. Parenchymal fatty atrophy. Spleen: Normal size. Adrenals: No mass.  Kidneys/Ureters: No calculus or hydronephrosis. Fluid density exophytic renal cysts measuring 4.3 cm on the right and 1.5 cm on the left Bladder:  No calculi Vasculature: Atherosclerotic calcification of a nonaneurysmal abdominal aorta.   GI Tract: Nondilated small and large bowel.No pericolonic fat stranding. Normal appendix and terminal ileum. Formed stool throughout the colon. Peritoneal Cavity/Mesentery: No ascites, free air or loculated fluid collection. No mesenteric edema. Retroperitoneum: No fluid collection. Lymph Nodes: No lymphadenopathy.  Pelvis: No mass. Abdominal wall soft tissues: Small fat containing right inguinal hernia. Bones: No suspicious osseous lesions. Multilevel degenerative disc disease.      - No acute infectious or inflammatory pathology identified in the abdomen or pelvis within the limits of noncontrast study. - Moderate bilateral dependently layering pleural effusions. - Remote right-sided rib fractures. - Prior cholecystectomy. - Atherosclerotic vascular disease.  All CT scans are performed using dose optimization techniques as appropriate to the performed exam and include at least one of the following: Automated exposure control, adjustment of the mA and/or kV according to size, and the use of iterative reconstruction technique.  ______________________________________ Electronically signed by: COOPER WRIGHT M.D. Date:     03/07/2025 Time:    16:21     US RENAL
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
## 2025-03-08 NOTE — PLAN OF CARE
Problem: Chronic Conditions and Co-morbidities  Goal: Patient's chronic conditions and co-morbidity symptoms are monitored and maintained or improved  Outcome: Progressing     Problem: ABCDS Injury Assessment  Goal: Absence of physical injury  Outcome: Progressing     Problem: Safety - Adult  Goal: Free from fall injury  Outcome: Progressing     Problem: Skin/Tissue Integrity  Goal: Skin integrity remains intact  Description: 1.  Monitor for areas of redness and/or skin breakdown  2.  Assess vascular access sites hourly  3.  Every 4-6 hours minimum:  Change oxygen saturation probe site  4.  Every 4-6 hours:  If on nasal continuous positive airway pressure, respiratory therapy assess nares and determine need for appliance change or resting period  Outcome: Progressing     Problem: Pain  Goal: Verbalizes/displays adequate comfort level or baseline comfort level  Outcome: Progressing

## 2025-03-08 NOTE — PROGRESS NOTES
[facility]      Patient:  Cholo Bhakta  YOB: 1955  Date of Service: 3/8/2025  MRN: [id number]   Acct: [id number]   Primary Care Physician: Nirmal Nuno MD  Advance Directive: Full Code  Admit Date: 3/6/2025       Hospital Day: 2  Portions of this note have been copied forward, however, changed to reflect the most current clinical status of this patient.    CHIEF COMPLAINT Shortness of breath    SUBJECTIVE:  He is seen sitting in the chart. He states that he is having ongoing nausea, which started yesterday.  BP stable.  He remains on room air.  Urine output approximately 1500 mL.    CUMULATIVE HOSPITAL STAY:  The patient is a 68 yo male with a PMH of HTN, CAD, HFrEF, CKD stage IV, type 2 diabetes, and diabetic foot wound who presented to [facility] ED on 3/6/2025 with complaints of shortness of breath.  He reported increased dyspnea on exertion as well as a 35 pound weight gain since his discharge from this facility on 2/24/2025.  He reported worsening edema to his lower abdomen and bilateral lower extremities.  He additionally reported that he had multiple wounds to his left foot.  He is established with Dr. Heaton, podiatry, and [facility] and recently underwent a wound debridement.  He and his wife reported that his wound would begin to improve and then he will experience edema again his left foot wound more open.  He reported decreased urine output.  He denied fever, chills, chest pain, palpitations, nausea or shortness of breath.  Further ED workup revealed , K5.2, , CO2 23, BUN 94 and creatinine 3.3.  proBNP 8365.  WBC 8.8, H&H 9.0/28.4 with a platelet count of 209.  Chest x-ray mild to moderate edema bilateral small effusions left greater than right.  He was admitted to hospital medicine for cardiorenal syndrome with volume overload.  He was placed on IV diuretics.  Net -3475  His renal functions remain relatively unchanged BUN 80 and creatinine 3.0.  Bumex 2 mg IV every 12  round, and reactive to light.   Cardiovascular:      Rate and Rhythm: Normal rate and regular rhythm.      Pulses: Normal pulses.      Heart sounds: Normal heart sounds.   Pulmonary:      Breath sounds: Examination of the right-lower field reveals rales. Examination of the left-lower field reveals rales. Rales present.   Abdominal:      General: There is distension.      Palpations: There is fluid wave.      Tenderness: There is no abdominal tenderness.   Musculoskeletal:      Cervical back: Normal range of motion and neck supple.      Right lower leg: Edema present.      Left lower leg: Edema present.   Skin:     General: Skin is warm.      Findings: Erythema and wound present.             Comments: Mild erythema noted to the left ankle area   Neurological:      General: No focal deficit present.      Mental Status: He is alert and oriented to person, place, and time.      Motor: Weakness present.             Medications:      sodium chloride      dextrose        insulin lispro  0-8 Units SubCUTAneous 4x Daily AC & HS    vitamin D  50,000 Units Oral Weekly    bumetanide  2 mg IntraVENous BID    sodium hypochlorite   Irrigation BID    doxycycline hyclate  100 mg Oral 2 times per day    busPIRone  10 mg Oral TID    allopurinol  200 mg Oral Daily    [Held by provider] bumetanide  2 mg Oral BID    calcitRIOL  0.25 mcg Oral Daily    donepezil  10 mg Oral Daily    [Held by provider] hydrALAZINE  10 mg Oral q8h    insulin glargine  50 Units SubCUTAneous Nightly    isosorbide dinitrate  10 mg Oral TID    metoprolol succinate  50 mg Oral Daily    pantoprazole  40 mg Oral Daily    pregabalin  100 mg Oral BID    rosuvastatin  10 mg Oral Nightly    tamsulosin  0.4 mg Oral Daily    sucralfate  1 g Oral 4x Daily    sodium chloride flush  5-40 mL IntraVENous 2 times per day    enoxaparin  30 mg SubCUTAneous BID     oxyCODONE HCl, meclizine, sodium chloride flush, sodium chloride, ondansetron **OR** ondansetron, polyethylene  glycol, acetaminophen **OR** acetaminophen, potassium chloride **OR** potassium alternative oral replacement **OR** potassium chloride, magnesium sulfate, glucose, dextrose bolus **OR** dextrose bolus, glucagon (rDNA), dextrose  ADULT DIET; Regular; No Added Salt (3-4 gm)     Lab and other Data:     Recent Labs     03/06/25  1051 03/07/25  0138 03/08/25  0654   WBC 8.8 7.8 6.5   HGB 9.0* 8.6* 8.3*    224 223     Recent Labs     03/06/25  1051 03/07/25  0138 03/08/25  0204    142 142   K 5.2* 4.5 4.5    107 106   CO2 23 22 24   BUN 94* 91* 84*   CREATININE 3.3* 3.3* 3.0*   GLUCOSE 348* 97 221*     Recent Labs     03/06/25  1051 03/07/25  0138   AST 32 25   ALT 44 37   BILITOT 0.3 0.4   ALKPHOS 155* 133*     Troponin T: No results for input(s): \"TROPONINI\" in the last 72 hours.  Pro-BNP: No results for input(s): \"BNP\" in the last 72 hours.  INR: No results for input(s): \"INR\" in the last 72 hours.  UA:  Recent Labs     03/07/25  1108   COLORU YELLOW   PHUR 5.0   WBCUA 1   RBCUA 0   BACTERIA Negative   CLARITYU CLOUDY*   LEUKOCYTESUR Negative   UROBILINOGEN 0.2   BILIRUBINUR Negative   BLOODU Negative   GLUCOSEU Negative     A1C: No results for input(s): \"LABA1C\" in the last 72 hours.  ABG:No results for input(s): \"PHART\", \"BFJ6NNJ\", \"PO2ART\", \"ZYL5JDE\", \"BEART\", \"HGBAE\", \"W1TOTJXV\", \"CARBOXHGBART\" in the last 72 hours.    RAD:   XR CHEST PORTABLE    Result Date: 3/6/2025  1.  Mild to moderate edema and small bilateral effusions, left greater than right.    ______________________________________ Electronically signed by: OLIVIA REDDY M.D. Date:     03/06/2025 Time:    11:39          Assessment/Plan   Principal Problem:    Acute on chronic HFrEF(HCC)/Volume overload   -Most recent 2D echo reviewed from [facility] EF 36 to 40%.  Mild LVH.  No significant valvular abnormalities.   -Continue Toprol   -Increase Bumex to 2mg IV BID   -Strict I&O   -Daily weights   -Low Na diet   -Will need CHF

## 2025-03-09 LAB
ANION GAP SERPL CALCULATED.3IONS-SCNC: 14 MMOL/L (ref 8–16)
BASOPHILS # BLD: 0.1 K/UL (ref 0–0.2)
BASOPHILS NFR BLD: 0.9 % (ref 0–1)
BUN SERPL-MCNC: 81 MG/DL (ref 8–23)
CALCIUM SERPL-MCNC: 9.2 MG/DL (ref 8.8–10.2)
CHLORIDE SERPL-SCNC: 106 MMOL/L (ref 98–107)
CO2 SERPL-SCNC: 22 MMOL/L (ref 22–29)
CREAT SERPL-MCNC: 3.3 MG/DL (ref 0.7–1.2)
EOSINOPHIL # BLD: 0.7 K/UL (ref 0–0.6)
EOSINOPHIL NFR BLD: 9.9 % (ref 0–5)
ERYTHROCYTE [DISTWIDTH] IN BLOOD BY AUTOMATED COUNT: 15.5 % (ref 11.5–14.5)
GLUCOSE BLD-MCNC: 160 MG/DL (ref 70–99)
GLUCOSE BLD-MCNC: 161 MG/DL (ref 70–99)
GLUCOSE BLD-MCNC: 240 MG/DL (ref 70–99)
GLUCOSE BLD-MCNC: 266 MG/DL (ref 70–99)
GLUCOSE SERPL-MCNC: 218 MG/DL (ref 70–99)
HCT VFR BLD AUTO: 28.6 % (ref 42–52)
HGB BLD-MCNC: 8.8 G/DL (ref 14–18)
IMM GRANULOCYTES # BLD: 0 K/UL
LYMPHOCYTES # BLD: 1 K/UL (ref 1.1–4.5)
LYMPHOCYTES NFR BLD: 13.9 % (ref 20–40)
MAGNESIUM SERPL-MCNC: 2.5 MG/DL (ref 1.6–2.4)
MCH RBC QN AUTO: 29.4 PG (ref 27–31)
MCHC RBC AUTO-ENTMCNC: 30.8 G/DL (ref 33–37)
MCV RBC AUTO: 95.7 FL (ref 80–94)
MONOCYTES # BLD: 0.8 K/UL (ref 0–0.9)
MONOCYTES NFR BLD: 10.9 % (ref 0–10)
NEUTROPHILS # BLD: 4.4 K/UL (ref 1.5–7.5)
NEUTS SEG NFR BLD: 64.1 % (ref 50–65)
PERFORMED ON: ABNORMAL
PLATELET # BLD AUTO: 219 K/UL (ref 130–400)
PMV BLD AUTO: 12.6 FL (ref 9.4–12.4)
POTASSIUM SERPL-SCNC: 4.4 MMOL/L (ref 3.5–5.1)
RBC # BLD AUTO: 2.99 M/UL (ref 4.7–6.1)
SODIUM SERPL-SCNC: 142 MMOL/L (ref 136–145)
WBC # BLD AUTO: 6.9 K/UL (ref 4.8–10.8)

## 2025-03-09 PROCEDURE — 6360000002 HC RX W HCPCS

## 2025-03-09 PROCEDURE — 6370000000 HC RX 637 (ALT 250 FOR IP): Performed by: INTERNAL MEDICINE

## 2025-03-09 PROCEDURE — 6370000000 HC RX 637 (ALT 250 FOR IP)

## 2025-03-09 PROCEDURE — 36415 COLL VENOUS BLD VENIPUNCTURE: CPT

## 2025-03-09 PROCEDURE — 83735 ASSAY OF MAGNESIUM: CPT

## 2025-03-09 PROCEDURE — 6360000002 HC RX W HCPCS: Performed by: NURSE PRACTITIONER

## 2025-03-09 PROCEDURE — 6370000000 HC RX 637 (ALT 250 FOR IP): Performed by: NURSE PRACTITIONER

## 2025-03-09 PROCEDURE — 99232 SBSQ HOSP IP/OBS MODERATE 35: CPT | Performed by: INTERNAL MEDICINE

## 2025-03-09 PROCEDURE — 82962 GLUCOSE BLOOD TEST: CPT

## 2025-03-09 PROCEDURE — 80048 BASIC METABOLIC PNL TOTAL CA: CPT

## 2025-03-09 PROCEDURE — 2500000003 HC RX 250 WO HCPCS

## 2025-03-09 PROCEDURE — 85025 COMPLETE CBC W/AUTO DIFF WBC: CPT

## 2025-03-09 PROCEDURE — 94760 N-INVAS EAR/PLS OXIMETRY 1: CPT

## 2025-03-09 PROCEDURE — 1200000000 HC SEMI PRIVATE

## 2025-03-09 RX ORDER — SENNA AND DOCUSATE SODIUM 50; 8.6 MG/1; MG/1
1 TABLET, FILM COATED ORAL 2 TIMES DAILY
Status: DISCONTINUED | OUTPATIENT
Start: 2025-03-09 | End: 2025-03-14 | Stop reason: HOSPADM

## 2025-03-09 RX ORDER — INSULIN GLARGINE 100 [IU]/ML
55 INJECTION, SOLUTION SUBCUTANEOUS NIGHTLY
Status: DISCONTINUED | OUTPATIENT
Start: 2025-03-09 | End: 2025-03-11

## 2025-03-09 RX ADMIN — SUCRALFATE 1 G: 1 TABLET ORAL at 14:13

## 2025-03-09 RX ADMIN — SODIUM CHLORIDE, PRESERVATIVE FREE 10 ML: 5 INJECTION INTRAVENOUS at 08:42

## 2025-03-09 RX ADMIN — PREGABALIN 100 MG: 50 CAPSULE ORAL at 20:46

## 2025-03-09 RX ADMIN — SUCRALFATE 1 G: 1 TABLET ORAL at 20:46

## 2025-03-09 RX ADMIN — ROSUVASTATIN CALCIUM 10 MG: 20 TABLET, FILM COATED ORAL at 20:46

## 2025-03-09 RX ADMIN — INSULIN LISPRO 4 UNITS: 100 INJECTION, SOLUTION INTRAVENOUS; SUBCUTANEOUS at 11:51

## 2025-03-09 RX ADMIN — OXYCODONE HYDROCHLORIDE 10 MG: 10 TABLET ORAL at 21:25

## 2025-03-09 RX ADMIN — BUSPIRONE HYDROCHLORIDE 10 MG: 10 TABLET ORAL at 09:46

## 2025-03-09 RX ADMIN — SODIUM CHLORIDE, PRESERVATIVE FREE 10 ML: 5 INJECTION INTRAVENOUS at 20:47

## 2025-03-09 RX ADMIN — SUCRALFATE 1 G: 1 TABLET ORAL at 08:41

## 2025-03-09 RX ADMIN — BUMETANIDE 2 MG: 0.25 INJECTION INTRAMUSCULAR; INTRAVENOUS at 09:46

## 2025-03-09 RX ADMIN — BUSPIRONE HYDROCHLORIDE 10 MG: 10 TABLET ORAL at 14:13

## 2025-03-09 RX ADMIN — DAKIN'S SOLUTION 0.125% (QUARTER STRENGTH): 0.12 SOLUTION at 09:47

## 2025-03-09 RX ADMIN — HYDRALAZINE HYDROCHLORIDE 10 MG: 10 TABLET ORAL at 08:42

## 2025-03-09 RX ADMIN — ISOSORBIDE DINITRATE 10 MG: 10 TABLET ORAL at 20:46

## 2025-03-09 RX ADMIN — DOXYCYCLINE HYCLATE 100 MG: 100 CAPSULE ORAL at 09:46

## 2025-03-09 RX ADMIN — ISOSORBIDE DINITRATE 10 MG: 10 TABLET ORAL at 08:42

## 2025-03-09 RX ADMIN — OXYCODONE HYDROCHLORIDE 10 MG: 10 TABLET ORAL at 10:53

## 2025-03-09 RX ADMIN — BUMETANIDE 2 MG: 0.25 INJECTION INTRAMUSCULAR; INTRAVENOUS at 17:49

## 2025-03-09 RX ADMIN — ALLOPURINOL 200 MG: 100 TABLET ORAL at 08:42

## 2025-03-09 RX ADMIN — DONEPEZIL HYDROCHLORIDE 10 MG: 5 TABLET ORAL at 09:46

## 2025-03-09 RX ADMIN — SUCRALFATE 1 G: 1 TABLET ORAL at 17:50

## 2025-03-09 RX ADMIN — DOXYCYCLINE HYCLATE 100 MG: 100 CAPSULE ORAL at 20:46

## 2025-03-09 RX ADMIN — APIXABAN 5 MG: 5 TABLET, FILM COATED ORAL at 20:46

## 2025-03-09 RX ADMIN — PREGABALIN 100 MG: 50 CAPSULE ORAL at 08:41

## 2025-03-09 RX ADMIN — ONDANSETRON 4 MG: 2 INJECTION INTRAMUSCULAR; INTRAVENOUS at 20:46

## 2025-03-09 RX ADMIN — HYDRALAZINE HYDROCHLORIDE 10 MG: 10 TABLET ORAL at 17:50

## 2025-03-09 RX ADMIN — DOCUSATE SODIUM 100 MG: 100 CAPSULE, LIQUID FILLED ORAL at 08:41

## 2025-03-09 RX ADMIN — MECLIZINE HYDROCHLORIDE 25 MG: 25 TABLET ORAL at 20:46

## 2025-03-09 RX ADMIN — INSULIN GLARGINE 55 UNITS: 100 INJECTION, SOLUTION SUBCUTANEOUS at 20:47

## 2025-03-09 RX ADMIN — ISOSORBIDE DINITRATE 10 MG: 10 TABLET ORAL at 14:13

## 2025-03-09 RX ADMIN — TAMSULOSIN HYDROCHLORIDE 0.4 MG: 0.4 CAPSULE ORAL at 08:41

## 2025-03-09 RX ADMIN — BUSPIRONE HYDROCHLORIDE 10 MG: 10 TABLET ORAL at 20:46

## 2025-03-09 RX ADMIN — PANTOPRAZOLE SODIUM 40 MG: 40 TABLET, DELAYED RELEASE ORAL at 08:41

## 2025-03-09 RX ADMIN — HYDRALAZINE HYDROCHLORIDE 10 MG: 10 TABLET ORAL at 06:33

## 2025-03-09 RX ADMIN — APIXABAN 5 MG: 5 TABLET, FILM COATED ORAL at 08:42

## 2025-03-09 RX ADMIN — SENNOSIDES AND DOCUSATE SODIUM 1 TABLET: 50; 8.6 TABLET ORAL at 20:46

## 2025-03-09 RX ADMIN — METOPROLOL SUCCINATE 50 MG: 50 TABLET, FILM COATED, EXTENDED RELEASE ORAL at 08:41

## 2025-03-09 RX ADMIN — DAKIN'S SOLUTION 0.125% (QUARTER STRENGTH): 0.12 SOLUTION at 20:48

## 2025-03-09 RX ADMIN — CALCITRIOL CAPSULES 0.25 MCG 0.25 MCG: 0.25 CAPSULE ORAL at 08:41

## 2025-03-09 RX ADMIN — INSULIN LISPRO 2 UNITS: 100 INJECTION, SOLUTION INTRAVENOUS; SUBCUTANEOUS at 08:41

## 2025-03-09 RX ADMIN — ONDANSETRON 4 MG: 2 INJECTION INTRAMUSCULAR; INTRAVENOUS at 14:13

## 2025-03-09 ASSESSMENT — PAIN DESCRIPTION - LOCATION: LOCATION: NECK

## 2025-03-09 ASSESSMENT — PAIN SCALES - GENERAL
PAINLEVEL_OUTOF10: 8
PAINLEVEL_OUTOF10: 0
PAINLEVEL_OUTOF10: 0

## 2025-03-09 ASSESSMENT — PAIN DESCRIPTION - ORIENTATION: ORIENTATION: MID

## 2025-03-09 ASSESSMENT — PAIN DESCRIPTION - DESCRIPTORS: DESCRIPTORS: DISCOMFORT;ACHING

## 2025-03-09 ASSESSMENT — PAIN - FUNCTIONAL ASSESSMENT: PAIN_FUNCTIONAL_ASSESSMENT: PREVENTS OR INTERFERES SOME ACTIVE ACTIVITIES AND ADLS
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
## 2025-03-09 NOTE — PROGRESS NOTES
[facility]      Patient:  Cholo Bhakta  YOB: 1955  Date of Service: 3/9/2025  MRN: [id number]   Acct: [id number]   Primary Care Physician: Nirmal Nuno MD  Advance Directive: Full Code  Admit Date: 3/6/2025       Hospital Day: 3  Portions of this note have been copied forward, however, changed to reflect the most current clinical status of this patient.    CHIEF COMPLAINT Shortness of breath    SUBJECTIVE:  He is seen sitting in the chair. He states that he is having ongoing nausea,  BP stable.  He remains on room air.  Urine output approximately 3070 mL.  Afebrile    CUMULATIVE HOSPITAL STAY:  The patient is a 70 yo male with a PMH of HTN, CAD, HFrEF, CKD stage IV, type 2 diabetes, and diabetic foot wound who presented to [facility] ED on 3/6/2025 with complaints of shortness of breath.  He reported increased dyspnea on exertion as well as a 35 pound weight gain since his discharge from this facility on 2/24/2025.  He reported worsening edema to his lower abdomen and bilateral lower extremities.  He additionally reported that he had multiple wounds to his left foot.  He is established with Dr. Heaton, podiatry, and [place] and recently underwent a wound debridement.  He and his wife reported that his wound would begin to improve and then he will experience edema again his left foot wound more open.  He reported decreased urine output.  He denied fever, chills, chest pain, palpitations, nausea or shortness of breath.  Further ED workup revealed , K5.2, , CO2 23, BUN 94 and creatinine 3.3.  proBNP 8365.  WBC 8.8, H&H 9.0/28.4 with a platelet count of 209.  Chest x-ray mild to moderate edema bilateral small effusions left greater than right.  He was admitted to hospital medicine for cardiorenal syndrome with volume overload.  He was placed on IV diuretics.  Net -[phone]  His renal functions remain relatively unchanged BUN 81 and creatinine 3.1.  Bumex 2 mg IV every 12 hours continues.

## 2025-03-10 LAB
ANION GAP SERPL CALCULATED.3IONS-SCNC: 13 MMOL/L (ref 8–16)
BASOPHILS # BLD: 0 K/UL (ref 0–0.2)
BASOPHILS NFR BLD: 0.4 % (ref 0–1)
BNP BLD-MCNC: 5056 PG/ML (ref 0–124)
BUN SERPL-MCNC: 81 MG/DL (ref 8–23)
CALCIUM SERPL-MCNC: 9.2 MG/DL (ref 8.8–10.2)
CHLORIDE SERPL-SCNC: 106 MMOL/L (ref 98–107)
CO2 SERPL-SCNC: 23 MMOL/L (ref 22–29)
CREAT SERPL-MCNC: 3.3 MG/DL (ref 0.7–1.2)
EOSINOPHIL # BLD: 0.4 K/UL (ref 0–0.6)
EOSINOPHIL NFR BLD: 6.3 % (ref 0–5)
ERYTHROCYTE [DISTWIDTH] IN BLOOD BY AUTOMATED COUNT: 15.1 % (ref 11.5–14.5)
GLUCOSE BLD-MCNC: 111 MG/DL (ref 70–99)
GLUCOSE BLD-MCNC: 199 MG/DL (ref 70–99)
GLUCOSE BLD-MCNC: 231 MG/DL (ref 70–99)
GLUCOSE BLD-MCNC: 310 MG/DL (ref 70–99)
GLUCOSE BLD-MCNC: 343 MG/DL (ref 70–99)
GLUCOSE SERPL-MCNC: 227 MG/DL (ref 70–99)
HBA1C MFR BLD: 9 % (ref 4–5.6)
HCT VFR BLD AUTO: 28.7 % (ref 42–52)
HGB BLD-MCNC: 9 G/DL (ref 14–18)
IMM GRANULOCYTES # BLD: 0 K/UL
LYMPHOCYTES # BLD: 0.9 K/UL (ref 1.1–4.5)
LYMPHOCYTES NFR BLD: 12.6 % (ref 20–40)
MAGNESIUM SERPL-MCNC: 2.4 MG/DL (ref 1.6–2.4)
MCH RBC QN AUTO: 29.4 PG (ref 27–31)
MCHC RBC AUTO-ENTMCNC: 31.4 G/DL (ref 33–37)
MCV RBC AUTO: 93.8 FL (ref 80–94)
MONOCYTES # BLD: 0.5 K/UL (ref 0–0.9)
MONOCYTES NFR BLD: 7.8 % (ref 0–10)
NEUTROPHILS # BLD: 5 K/UL (ref 1.5–7.5)
NEUTS SEG NFR BLD: 72.6 % (ref 50–65)
PERFORMED ON: ABNORMAL
PLATELET # BLD AUTO: 234 K/UL (ref 130–400)
PMV BLD AUTO: 12.3 FL (ref 9.4–12.4)
POTASSIUM SERPL-SCNC: 4.9 MMOL/L (ref 3.5–5.1)
RBC # BLD AUTO: 3.06 M/UL (ref 4.7–6.1)
SODIUM SERPL-SCNC: 142 MMOL/L (ref 136–145)
WBC # BLD AUTO: 6.8 K/UL (ref 4.8–10.8)

## 2025-03-10 PROCEDURE — 1200000000 HC SEMI PRIVATE

## 2025-03-10 PROCEDURE — 6370000000 HC RX 637 (ALT 250 FOR IP)

## 2025-03-10 PROCEDURE — 6370000000 HC RX 637 (ALT 250 FOR IP): Performed by: INTERNAL MEDICINE

## 2025-03-10 PROCEDURE — 2580000003 HC RX 258

## 2025-03-10 PROCEDURE — 80048 BASIC METABOLIC PNL TOTAL CA: CPT

## 2025-03-10 PROCEDURE — 36415 COLL VENOUS BLD VENIPUNCTURE: CPT

## 2025-03-10 PROCEDURE — 6360000002 HC RX W HCPCS: Performed by: NURSE PRACTITIONER

## 2025-03-10 PROCEDURE — 6360000002 HC RX W HCPCS

## 2025-03-10 PROCEDURE — 6370000000 HC RX 637 (ALT 250 FOR IP): Performed by: NURSE PRACTITIONER

## 2025-03-10 PROCEDURE — 83880 ASSAY OF NATRIURETIC PEPTIDE: CPT

## 2025-03-10 PROCEDURE — 82962 GLUCOSE BLOOD TEST: CPT

## 2025-03-10 PROCEDURE — 83036 HEMOGLOBIN GLYCOSYLATED A1C: CPT

## 2025-03-10 PROCEDURE — 2500000003 HC RX 250 WO HCPCS

## 2025-03-10 PROCEDURE — 85025 COMPLETE CBC W/AUTO DIFF WBC: CPT

## 2025-03-10 PROCEDURE — 83735 ASSAY OF MAGNESIUM: CPT

## 2025-03-10 PROCEDURE — 94760 N-INVAS EAR/PLS OXIMETRY 1: CPT

## 2025-03-10 RX ORDER — INSULIN LISPRO 100 [IU]/ML
5 INJECTION, SOLUTION INTRAVENOUS; SUBCUTANEOUS
Status: DISCONTINUED | OUTPATIENT
Start: 2025-03-10 | End: 2025-03-12

## 2025-03-10 RX ADMIN — APIXABAN 5 MG: 5 TABLET, FILM COATED ORAL at 09:36

## 2025-03-10 RX ADMIN — BUMETANIDE 2 MG: 0.25 INJECTION INTRAMUSCULAR; INTRAVENOUS at 17:45

## 2025-03-10 RX ADMIN — METOPROLOL SUCCINATE 50 MG: 50 TABLET, FILM COATED, EXTENDED RELEASE ORAL at 09:36

## 2025-03-10 RX ADMIN — INSULIN LISPRO 5 UNITS: 100 INJECTION, SOLUTION INTRAVENOUS; SUBCUTANEOUS at 17:01

## 2025-03-10 RX ADMIN — ACETAMINOPHEN 650 MG: 325 TABLET ORAL at 21:08

## 2025-03-10 RX ADMIN — ONDANSETRON 4 MG: 2 INJECTION INTRAMUSCULAR; INTRAVENOUS at 02:55

## 2025-03-10 RX ADMIN — SENNOSIDES AND DOCUSATE SODIUM 1 TABLET: 50; 8.6 TABLET ORAL at 09:37

## 2025-03-10 RX ADMIN — TAMSULOSIN HYDROCHLORIDE 0.4 MG: 0.4 CAPSULE ORAL at 09:36

## 2025-03-10 RX ADMIN — DAKIN'S SOLUTION 0.125% (QUARTER STRENGTH): 0.12 SOLUTION at 16:27

## 2025-03-10 RX ADMIN — APIXABAN 5 MG: 5 TABLET, FILM COATED ORAL at 21:08

## 2025-03-10 RX ADMIN — SUCRALFATE 1 G: 1 TABLET ORAL at 12:58

## 2025-03-10 RX ADMIN — VANCOMYCIN HYDROCHLORIDE 2500 MG: 5 INJECTION, POWDER, LYOPHILIZED, FOR SOLUTION INTRAVENOUS at 17:51

## 2025-03-10 RX ADMIN — SODIUM CHLORIDE, PRESERVATIVE FREE 10 ML: 5 INJECTION INTRAVENOUS at 09:37

## 2025-03-10 RX ADMIN — ISOSORBIDE DINITRATE 10 MG: 10 TABLET ORAL at 09:36

## 2025-03-10 RX ADMIN — PREGABALIN 100 MG: 50 CAPSULE ORAL at 21:07

## 2025-03-10 RX ADMIN — INSULIN LISPRO 6 UNITS: 100 INJECTION, SOLUTION INTRAVENOUS; SUBCUTANEOUS at 12:59

## 2025-03-10 RX ADMIN — OXYCODONE HYDROCHLORIDE 10 MG: 10 TABLET ORAL at 17:54

## 2025-03-10 RX ADMIN — MEROPENEM 1000 MG: 1 INJECTION INTRAVENOUS at 17:01

## 2025-03-10 RX ADMIN — INSULIN LISPRO 2 UNITS: 100 INJECTION, SOLUTION INTRAVENOUS; SUBCUTANEOUS at 17:02

## 2025-03-10 RX ADMIN — ISOSORBIDE DINITRATE 10 MG: 10 TABLET ORAL at 21:07

## 2025-03-10 RX ADMIN — SUCRALFATE 1 G: 1 TABLET ORAL at 21:08

## 2025-03-10 RX ADMIN — SUCRALFATE 1 G: 1 TABLET ORAL at 09:36

## 2025-03-10 RX ADMIN — MECLIZINE HYDROCHLORIDE 25 MG: 25 TABLET ORAL at 17:44

## 2025-03-10 RX ADMIN — CALCITRIOL CAPSULES 0.25 MCG 0.25 MCG: 0.25 CAPSULE ORAL at 09:38

## 2025-03-10 RX ADMIN — ONDANSETRON 4 MG: 2 INJECTION INTRAMUSCULAR; INTRAVENOUS at 10:17

## 2025-03-10 RX ADMIN — HYDRALAZINE HYDROCHLORIDE 10 MG: 10 TABLET ORAL at 09:36

## 2025-03-10 RX ADMIN — ROSUVASTATIN CALCIUM 10 MG: 20 TABLET, FILM COATED ORAL at 21:08

## 2025-03-10 RX ADMIN — PANTOPRAZOLE SODIUM 40 MG: 40 TABLET, DELAYED RELEASE ORAL at 09:36

## 2025-03-10 RX ADMIN — INSULIN LISPRO 6 UNITS: 100 INJECTION, SOLUTION INTRAVENOUS; SUBCUTANEOUS at 09:35

## 2025-03-10 RX ADMIN — HYDRALAZINE HYDROCHLORIDE 10 MG: 10 TABLET ORAL at 17:44

## 2025-03-10 RX ADMIN — BUSPIRONE HYDROCHLORIDE 10 MG: 10 TABLET ORAL at 09:35

## 2025-03-10 RX ADMIN — BUSPIRONE HYDROCHLORIDE 10 MG: 10 TABLET ORAL at 21:07

## 2025-03-10 RX ADMIN — BUSPIRONE HYDROCHLORIDE 10 MG: 10 TABLET ORAL at 12:58

## 2025-03-10 RX ADMIN — ONDANSETRON 4 MG: 4 TABLET, ORALLY DISINTEGRATING ORAL at 16:04

## 2025-03-10 RX ADMIN — BUMETANIDE 2 MG: 0.25 INJECTION INTRAMUSCULAR; INTRAVENOUS at 09:37

## 2025-03-10 RX ADMIN — ALLOPURINOL 200 MG: 100 TABLET ORAL at 09:35

## 2025-03-10 RX ADMIN — IRON SUCROSE 200 MG: 20 INJECTION, SOLUTION INTRAVENOUS at 10:32

## 2025-03-10 RX ADMIN — INSULIN LISPRO 5 UNITS: 100 INJECTION, SOLUTION INTRAVENOUS; SUBCUTANEOUS at 12:59

## 2025-03-10 RX ADMIN — SENNOSIDES AND DOCUSATE SODIUM 1 TABLET: 50; 8.6 TABLET ORAL at 21:08

## 2025-03-10 RX ADMIN — DOXYCYCLINE HYCLATE 100 MG: 100 CAPSULE ORAL at 09:36

## 2025-03-10 RX ADMIN — SUCRALFATE 1 G: 1 TABLET ORAL at 15:39

## 2025-03-10 RX ADMIN — PREGABALIN 100 MG: 50 CAPSULE ORAL at 09:36

## 2025-03-10 RX ADMIN — INSULIN GLARGINE 55 UNITS: 100 INJECTION, SOLUTION SUBCUTANEOUS at 21:08

## 2025-03-10 RX ADMIN — DONEPEZIL HYDROCHLORIDE 10 MG: 5 TABLET ORAL at 09:36

## 2025-03-10 ASSESSMENT — PAIN SCALES - GENERAL: PAINLEVEL_OUTOF10: 6

## 2025-03-10 ASSESSMENT — PAIN DESCRIPTION - LOCATION: LOCATION: GENERALIZED

## 2025-03-10 NOTE — PROGRESS NOTES
[facility]      Patient:  Cholo Bhakta  YOB: 1955  Date of Service: 3/10/2025  MRN: [id number]   Acct: [id number]   Primary Care Physician: Nirmal Nuno MD  Advance Directive: Full Code  Admit Date: 3/6/2025       Hospital Day: 4  Portions of this note have been copied forward, however, changed to reflect the most current clinical status of this patient.    CHIEF COMPLAINT shortness of breath    SUBJECTIVE: Patient discussed with nephrology and need for dialysis, sitting in recliner tearful    Cumulative Hospital course  69-year-old male with CAD, s/p CABG, systolic heart failure, type II DM, chronic diabetic foot wound follows outpatient podiatry, CKD 4, permanent AF on Eliquis, morbid obesity, with complaints of shortness of breath.  Patient was recently admitted on 2/21 due to acute on chronic systolic heart failure at that time he diuresed well and was able to discharge home in stable condition on 2/24.  Returns on 3/6 due to worsening shortness of breath.  Stated that he had had worsening abdominal swelling and lower extremity edema and noted a 20 pound weight gain. Workup in ER CXR moderate edema bilateral small effusions left greater than right, K5.2 creatinine 3.3 BUN 94, BNP 8365.  Admitted to hospitalist service due to acute on chronic systolic heart failure.  Initiated on IV diuretics and nephrology consulted due to decline in renal function.  Cardiology consulted to assist with fluid management underwent limited 2D echo for LV evaluation currently EF 45 to 50% with mild global hypokinesis.  Has noted improvement since January.  Recommending continuing diuretics and has signed off.  In addition he reports that he follows with podiatry Dr. Heaton in [place] and underwent a wound debridement. Initially started on Doxycycline. Wound culture obtained Bacteroides fragilis, Morganella morganii, Proteus mirabilis, Corynebacterium, Enterococcus faecalis, Finegoldia magna,  142 142   K 4.5 4.4 4.9    106 106   CO2 24 22 23   BUN 84* 81* 81*   CREATININE 3.0* 3.3* 3.3*   GLUCOSE 221* 218* 227*     UA:  Recent Labs     03/07/25  1108   COLORU YELLOW   PHUR 5.0   WBCUA 1   RBCUA 0   BACTERIA Negative   CLARITYU CLOUDY*   LEUKOCYTESUR Negative   UROBILINOGEN 0.2   BILIRUBINUR Negative   BLOODU Negative   GLUCOSEU Negative     A1C:   Recent Labs     03/10/25  0210   LABA1C 9.0*     RAD:   CT ABDOMEN PELVIS WO CONTRAST Additional Contrast? None  Result Date: 3/7/2025  - No acute infectious or inflammatory pathology identified in the abdomen or pelvis within the limits of noncontrast study. - Moderate bilateral dependently layering pleural effusions. - Remote right-sided rib fractures. - Prior cholecystectomy. - Atherosclerotic vascular disease.  All CT scans are performed using dose optimization techniques as appropriate to the performed exam and include at least one of the following: Automated exposure control, adjustment of the mA and/or kV according to size, and the use of iterative reconstruction technique.  ______________________________________ Electronically signed by: COOPER WRIGHT M.D. Date:     03/07/2025 Time:    16:21     US RENAL COMPLETE  Result Date: 3/7/2025  Normal sonographic appearance of the kidneys and urinary bladder.    ______________________________________ Electronically signed by: ISMA ANSARI M.D. Date:     03/07/2025 Time:    16:24     XR CHEST PORTABLE  Result Date: 3/6/2025  1.  Mild to moderate edema and small bilateral effusions, left greater than right.    ______________________________________ Electronically signed by: OLIVIA REDDY M.D. Date:     03/06/2025 Time:    11:39        Echo:     Left Ventricle: Mildly reduced left ventricular systolic function with a visually estimated EF of 45 - 50%. EF by visual approximation is 45%. Left ventricle size is normal. Moderately increased wall thickness. Mild global hypokinesis present.    Left Atrium: Left

## 2025-03-10 NOTE — PROGRESS NOTES
[facility]   Pharmacy Pharmacokinetic Monitoring Service - Vancomycin     Cholo Bhakta is a 69 y.o. male starting on vancomycin therapy for SSTI. Pharmacy consulted by CORI Fried for monitoring and adjustment.    Target Concentration: Dosing based on anticipated concentration <15 mg/L due to renal impairment/insufficiency    Additional Antimicrobials: Merrem    Pertinent Laboratory Values:   Wt Readings from Last 1 Encounters:   03/08/25 (!) 136.5 kg (301 lb)     Temp Readings from Last 1 Encounters:   03/10/25 97.7 °F (36.5 °C)     Estimated Creatinine Clearance: 31 mL/min (A) (based on SCr of 3.3 mg/dL (H)).  Recent Labs     03/09/25  0154 03/10/25  0210   CREATININE 3.3* 3.3*   BUN 81* 81*   WBC 6.9 6.8     Procalcitonin: NA    Pertinent Cultures:  Culture Date Source Results   PTA Wound Bacteroides fragilis, Morganella morganii, Proteus mirabilis, Corynebacterium, Enterococcus faecalis, Finegoldia magna, Staphylococcus aureus.   MRSA Nasal Swab: N/A. Non-respiratory infection.    Plan:  Concentration-guided dosing due to renal impairment/insufficiency  Start vancomycin loading dose Vancomycin 2500 mg IV x 1 dose    Renal labs as indicated   Vancomycin concentration ordered for 03/11 @ 6174   Pharmacy will continue to monitor patient and adjust therapy as indicated    Thank you for the consult,  Stephanie Stewart RP, BCPS  3/10/2025 4:46 PM

## 2025-03-10 NOTE — PLAN OF CARE
Problem: Chronic Conditions and Co-morbidities  Goal: Patient's chronic conditions and co-morbidity symptoms are monitored and maintained or improved  Outcome: Progressing     Problem: ABCDS Injury Assessment  Goal: Absence of physical injury  Outcome: Progressing  Flowsheets (Taken 3/10/2025 1446)  Absence of Physical Injury: Implement safety measures based on patient assessment     Problem: Safety - Adult  Goal: Free from fall injury  Outcome: Progressing  Flowsheets (Taken 3/10/2025 1446)  Free From Fall Injury: Based on caregiver fall risk screen, instruct family/caregiver to ask for assistance with transferring infant if caregiver noted to have fall risk factors     Problem: Skin/Tissue Integrity  Goal: Skin integrity remains intact  Description: 1.  Monitor for areas of redness and/or skin breakdown  2.  Assess vascular access sites hourly  3.  Every 4-6 hours minimum:  Change oxygen saturation probe site  4.  Every 4-6 hours:  If on nasal continuous positive airway pressure, respiratory therapy assess nares and determine need for appliance change or resting period  Outcome: Progressing  Flowsheets (Taken 3/10/2025 1446)  Skin Integrity Remains Intact: Monitor for areas of redness and/or skin breakdown     Problem: Pain  Goal: Verbalizes/displays adequate comfort level or baseline comfort level  Outcome: Progressing

## 2025-03-10 NOTE — PLAN OF CARE
Problem: Chronic Conditions and Co-morbidities  Goal: Patient's chronic conditions and co-morbidity symptoms are monitored and maintained or improved  3/9/2025 2009 by Rosario Bishop RN  Outcome: Progressing  3/9/2025 1837 by Clarissa Shaver LPN  Outcome: Progressing     Problem: ABCDS Injury Assessment  Goal: Absence of physical injury  3/9/2025 2009 by Rosario Bishop RN  Outcome: Progressing  3/9/2025 1837 by Clarissa Shaver LPN  Outcome: Progressing     Problem: Safety - Adult  Goal: Free from fall injury  3/9/2025 2009 by Rosario Bishop RN  Outcome: Progressing  3/9/2025 1837 by Clarissa Shaver LPN  Outcome: Progressing     Problem: Skin/Tissue Integrity  Goal: Skin integrity remains intact  Description: 1.  Monitor for areas of redness and/or skin breakdown  2.  Assess vascular access sites hourly  3.  Every 4-6 hours minimum:  Change oxygen saturation probe site  4.  Every 4-6 hours:  If on nasal continuous positive airway pressure, respiratory therapy assess nares and determine need for appliance change or resting period  3/9/2025 2009 by Rosario Bishop RN  Outcome: Progressing  3/9/2025 1837 by Clarissa Shaver LPN  Outcome: Progressing     Problem: Pain  Goal: Verbalizes/displays adequate comfort level or baseline comfort level  3/9/2025 2009 by Rosario Bishop RN  Outcome: Progressing  3/9/2025 1837 by Clarissa Shaver LPN  Outcome: Progressing

## 2025-03-10 NOTE — PROGRESS NOTES
Occupational Therapy  Pt sitting up in recliner prior to lunch and following lunch with family present on both occasions. Pt assisted to hold legs of recliner to be propped on trash can to remain elevated. Pt declines mobility, toileting or back to bed at this time. Will continue to follow as able. Electronically signed by DARCY Almeida on 3/10/2025 at 2:19 PM

## 2025-03-10 NOTE — PROGRESS NOTES
Pharmacy Adjustment per [facility] protocol    Cholo Bhakta is a 69 y.o. male. Pharmacy has adjusted medications per [facility] protocol.    Recent Labs     03/09/25  0154 03/10/25  0210   BUN 81* 81*       Recent Labs     03/09/25  0154 03/10/25  0210   CREATININE 3.3* 3.3*       Estimated Creatinine Clearance: 31 mL/min (A) (based on SCr of 3.3 mg/dL (H)).    Height:   Ht Readings from Last 1 Encounters:   03/08/25 1.829 m (6')     Weight:  Wt Readings from Last 1 Encounters:   03/08/25 (!) 136.5 kg (301 lb)    BMI:  BMI Readings from Last 1 Encounters:   03/08/25 40.82 kg/m²         Plan: Adjust the following medications based on [facility] protocol:           Meropenem to 1000 mg IV once over 30 minutes followed by 1000 mg IV every 12 hours extended infusion over 180 minutes      Electronically signed by Sarah Beth Dillard RPH on 3/10/2025 at 4:13 PM

## 2025-03-10 NOTE — PROGRESS NOTES
Patient, patients wife, and Dr. Rowe have conferred and decided that patient will not be proceeding with dialysis. Wife claims Dr. Rowe will be reaching out to Dr. Spicer to let him know.
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
## 2025-03-11 LAB
ANION GAP SERPL CALCULATED.3IONS-SCNC: 14 MMOL/L (ref 8–16)
BASOPHILS # BLD: 0.1 K/UL (ref 0–0.2)
BASOPHILS NFR BLD: 1.1 % (ref 0–1)
BUN SERPL-MCNC: 78 MG/DL (ref 8–23)
CALCIUM SERPL-MCNC: 9.2 MG/DL (ref 8.8–10.2)
CHLORIDE SERPL-SCNC: 104 MMOL/L (ref 98–107)
CO2 SERPL-SCNC: 24 MMOL/L (ref 22–29)
CREAT SERPL-MCNC: 3.6 MG/DL (ref 0.7–1.2)
EOSINOPHIL # BLD: 0.7 K/UL (ref 0–0.6)
EOSINOPHIL NFR BLD: 10.5 % (ref 0–5)
ERYTHROCYTE [DISTWIDTH] IN BLOOD BY AUTOMATED COUNT: 15.2 % (ref 11.5–14.5)
GLUCOSE BLD-MCNC: 126 MG/DL (ref 70–99)
GLUCOSE BLD-MCNC: 157 MG/DL (ref 70–99)
GLUCOSE BLD-MCNC: 197 MG/DL (ref 70–99)
GLUCOSE BLD-MCNC: 57 MG/DL (ref 70–99)
GLUCOSE SERPL-MCNC: 95 MG/DL (ref 70–99)
HBV SURFACE AG SERPL QL IA: NORMAL
HCT VFR BLD AUTO: 29.3 % (ref 42–52)
HGB BLD-MCNC: 9.1 G/DL (ref 14–18)
IMM GRANULOCYTES # BLD: 0 K/UL
LYMPHOCYTES # BLD: 1.1 K/UL (ref 1.1–4.5)
LYMPHOCYTES NFR BLD: 17.9 % (ref 20–40)
MAGNESIUM SERPL-MCNC: 2.3 MG/DL (ref 1.6–2.4)
MCH RBC QN AUTO: 29.2 PG (ref 27–31)
MCHC RBC AUTO-ENTMCNC: 31.1 G/DL (ref 33–37)
MCV RBC AUTO: 93.9 FL (ref 80–94)
MONOCYTES # BLD: 0.6 K/UL (ref 0–0.9)
MONOCYTES NFR BLD: 9.7 % (ref 0–10)
NEUTROPHILS # BLD: 3.9 K/UL (ref 1.5–7.5)
NEUTS SEG NFR BLD: 60.5 % (ref 50–65)
PERFORMED ON: ABNORMAL
PLATELET # BLD AUTO: 231 K/UL (ref 130–400)
PMV BLD AUTO: 11.9 FL (ref 9.4–12.4)
POTASSIUM SERPL-SCNC: 4.2 MMOL/L (ref 3.5–5.1)
RBC # BLD AUTO: 3.12 M/UL (ref 4.7–6.1)
SODIUM SERPL-SCNC: 142 MMOL/L (ref 136–145)
VANCOMYCIN SERPL-MCNC: 13.3 UG/ML
WBC # BLD AUTO: 6.4 K/UL (ref 4.8–10.8)

## 2025-03-11 PROCEDURE — 6360000002 HC RX W HCPCS

## 2025-03-11 PROCEDURE — 6370000000 HC RX 637 (ALT 250 FOR IP): Performed by: INTERNAL MEDICINE

## 2025-03-11 PROCEDURE — 83735 ASSAY OF MAGNESIUM: CPT

## 2025-03-11 PROCEDURE — 6370000000 HC RX 637 (ALT 250 FOR IP): Performed by: NURSE PRACTITIONER

## 2025-03-11 PROCEDURE — 94760 N-INVAS EAR/PLS OXIMETRY 1: CPT

## 2025-03-11 PROCEDURE — 2580000003 HC RX 258

## 2025-03-11 PROCEDURE — 82962 GLUCOSE BLOOD TEST: CPT

## 2025-03-11 PROCEDURE — 87340 HEPATITIS B SURFACE AG IA: CPT

## 2025-03-11 PROCEDURE — 2500000003 HC RX 250 WO HCPCS

## 2025-03-11 PROCEDURE — 80048 BASIC METABOLIC PNL TOTAL CA: CPT

## 2025-03-11 PROCEDURE — 36415 COLL VENOUS BLD VENIPUNCTURE: CPT

## 2025-03-11 PROCEDURE — 6370000000 HC RX 637 (ALT 250 FOR IP)

## 2025-03-11 PROCEDURE — 80202 ASSAY OF VANCOMYCIN: CPT

## 2025-03-11 PROCEDURE — 97530 THERAPEUTIC ACTIVITIES: CPT

## 2025-03-11 PROCEDURE — 6360000002 HC RX W HCPCS: Performed by: NURSE PRACTITIONER

## 2025-03-11 PROCEDURE — 1200000000 HC SEMI PRIVATE

## 2025-03-11 PROCEDURE — 85025 COMPLETE CBC W/AUTO DIFF WBC: CPT

## 2025-03-11 RX ORDER — INSULIN GLARGINE 100 [IU]/ML
50 INJECTION, SOLUTION SUBCUTANEOUS NIGHTLY
Status: DISCONTINUED | OUTPATIENT
Start: 2025-03-11 | End: 2025-03-12

## 2025-03-11 RX ORDER — LACTULOSE 10 G/15ML
20 SOLUTION ORAL 3 TIMES DAILY
Status: DISCONTINUED | OUTPATIENT
Start: 2025-03-11 | End: 2025-03-14 | Stop reason: HOSPADM

## 2025-03-11 RX ORDER — POLYETHYLENE GLYCOL 3350 17 G/17G
17 POWDER, FOR SOLUTION ORAL DAILY
Status: DISCONTINUED | OUTPATIENT
Start: 2025-03-11 | End: 2025-03-14 | Stop reason: HOSPADM

## 2025-03-11 RX ADMIN — APIXABAN 5 MG: 5 TABLET, FILM COATED ORAL at 21:12

## 2025-03-11 RX ADMIN — HYDRALAZINE HYDROCHLORIDE 10 MG: 10 TABLET ORAL at 16:55

## 2025-03-11 RX ADMIN — LACTULOSE 20 G: 20 SOLUTION ORAL at 14:44

## 2025-03-11 RX ADMIN — TAMSULOSIN HYDROCHLORIDE 0.4 MG: 0.4 CAPSULE ORAL at 09:47

## 2025-03-11 RX ADMIN — OXYCODONE HYDROCHLORIDE 10 MG: 10 TABLET ORAL at 10:02

## 2025-03-11 RX ADMIN — ISOSORBIDE DINITRATE 10 MG: 10 TABLET ORAL at 09:48

## 2025-03-11 RX ADMIN — OXYCODONE HYDROCHLORIDE 10 MG: 10 TABLET ORAL at 21:12

## 2025-03-11 RX ADMIN — CALCITRIOL CAPSULES 0.25 MCG 0.25 MCG: 0.25 CAPSULE ORAL at 09:48

## 2025-03-11 RX ADMIN — SUCRALFATE 1 G: 1 TABLET ORAL at 14:44

## 2025-03-11 RX ADMIN — PANTOPRAZOLE SODIUM 40 MG: 40 TABLET, DELAYED RELEASE ORAL at 09:57

## 2025-03-11 RX ADMIN — BUSPIRONE HYDROCHLORIDE 10 MG: 10 TABLET ORAL at 21:12

## 2025-03-11 RX ADMIN — DONEPEZIL HYDROCHLORIDE 10 MG: 5 TABLET ORAL at 09:49

## 2025-03-11 RX ADMIN — IRON SUCROSE 200 MG: 20 INJECTION, SOLUTION INTRAVENOUS at 09:49

## 2025-03-11 RX ADMIN — VANCOMYCIN HYDROCHLORIDE 2000 MG: 10 INJECTION, POWDER, LYOPHILIZED, FOR SOLUTION INTRAVENOUS at 21:20

## 2025-03-11 RX ADMIN — SENNOSIDES AND DOCUSATE SODIUM 1 TABLET: 50; 8.6 TABLET ORAL at 09:48

## 2025-03-11 RX ADMIN — APIXABAN 5 MG: 5 TABLET, FILM COATED ORAL at 09:49

## 2025-03-11 RX ADMIN — ROSUVASTATIN CALCIUM 10 MG: 20 TABLET, FILM COATED ORAL at 21:13

## 2025-03-11 RX ADMIN — BUSPIRONE HYDROCHLORIDE 10 MG: 10 TABLET ORAL at 09:48

## 2025-03-11 RX ADMIN — HYDRALAZINE HYDROCHLORIDE 10 MG: 10 TABLET ORAL at 09:49

## 2025-03-11 RX ADMIN — SODIUM CHLORIDE, PRESERVATIVE FREE 10 ML: 5 INJECTION INTRAVENOUS at 09:50

## 2025-03-11 RX ADMIN — BUMETANIDE 2 MG: 0.25 INJECTION INTRAMUSCULAR; INTRAVENOUS at 09:49

## 2025-03-11 RX ADMIN — METOPROLOL SUCCINATE 50 MG: 50 TABLET, FILM COATED, EXTENDED RELEASE ORAL at 09:47

## 2025-03-11 RX ADMIN — ONDANSETRON 4 MG: 2 INJECTION INTRAMUSCULAR; INTRAVENOUS at 21:13

## 2025-03-11 RX ADMIN — ISOSORBIDE DINITRATE 10 MG: 10 TABLET ORAL at 14:44

## 2025-03-11 RX ADMIN — PREGABALIN 100 MG: 50 CAPSULE ORAL at 21:12

## 2025-03-11 RX ADMIN — SODIUM CHLORIDE, PRESERVATIVE FREE 10 ML: 5 INJECTION INTRAVENOUS at 21:13

## 2025-03-11 RX ADMIN — PREGABALIN 100 MG: 50 CAPSULE ORAL at 09:48

## 2025-03-11 RX ADMIN — ALLOPURINOL 200 MG: 100 TABLET ORAL at 09:48

## 2025-03-11 RX ADMIN — MEROPENEM 1000 MG: 1 INJECTION INTRAVENOUS at 05:12

## 2025-03-11 RX ADMIN — MEROPENEM 1000 MG: 1 INJECTION INTRAVENOUS at 17:00

## 2025-03-11 RX ADMIN — DAKIN'S SOLUTION 0.125% (QUARTER STRENGTH): 0.12 SOLUTION at 15:44

## 2025-03-11 RX ADMIN — INSULIN GLARGINE 50 UNITS: 100 INJECTION, SOLUTION SUBCUTANEOUS at 21:14

## 2025-03-11 RX ADMIN — LACTULOSE 20 G: 20 SOLUTION ORAL at 10:02

## 2025-03-11 RX ADMIN — ISOSORBIDE DINITRATE 10 MG: 10 TABLET ORAL at 21:13

## 2025-03-11 RX ADMIN — LACTULOSE 20 G: 20 SOLUTION ORAL at 21:13

## 2025-03-11 RX ADMIN — DAKIN'S SOLUTION 0.125% (QUARTER STRENGTH): 0.12 SOLUTION at 05:13

## 2025-03-11 RX ADMIN — SODIUM CHLORIDE, PRESERVATIVE FREE 10 ML: 5 INJECTION INTRAVENOUS at 05:13

## 2025-03-11 RX ADMIN — POLYETHYLENE GLYCOL 3350 17 G: 17 POWDER, FOR SOLUTION ORAL at 10:02

## 2025-03-11 RX ADMIN — SUCRALFATE 1 G: 1 TABLET ORAL at 21:13

## 2025-03-11 RX ADMIN — BUMETANIDE 2 MG: 0.25 INJECTION INTRAMUSCULAR; INTRAVENOUS at 16:55

## 2025-03-11 RX ADMIN — SENNOSIDES AND DOCUSATE SODIUM 1 TABLET: 50; 8.6 TABLET ORAL at 21:12

## 2025-03-11 RX ADMIN — SUCRALFATE 1 G: 1 TABLET ORAL at 09:46

## 2025-03-11 RX ADMIN — SUCRALFATE 1 G: 1 TABLET ORAL at 16:55

## 2025-03-11 RX ADMIN — BUSPIRONE HYDROCHLORIDE 10 MG: 10 TABLET ORAL at 14:44

## 2025-03-11 ASSESSMENT — PAIN DESCRIPTION - LOCATION
LOCATION: BACK;NECK
LOCATION: BACK;FOOT;GENERALIZED

## 2025-03-11 ASSESSMENT — PAIN SCALES - GENERAL
PAINLEVEL_OUTOF10: 7
PAINLEVEL_OUTOF10: 6

## 2025-03-11 NOTE — PROGRESS NOTES
Palliative care follow up visit.  Pt up in chair, sleepy, but answers questions.  Spouse left to get something to eat.  Spoke with pt about his \"refusal\" HD he just states \"I just don't want to do it\". Also tells me \"The Dr said I could wait a while\".  Discussion on why he was reluctant, pt states \"I watched my sister on HD for 4 yrs and I didn't like what she went through\". Pt also states \"I may have to I just don't know yet\".  He endorses \"all over' pain. Meds have been given.  No other needs or concerns voiced at this time.  Palliative will continue to follow for support.    Electronically signed by Maia Narayanan RN on 3/11/2025 at 1:39 PM
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
## 2025-03-11 NOTE — PROGRESS NOTES
Comprehensive Nutrition Assessment    Type and Reason for Visit:  Initial, Consult    Nutrition Recommendations/Plan:   Follow for diet hx and  explanation of dietary educational material left for patient     Malnutrition Assessment:  Malnutrition Status:  At risk for malnutrition (03/11/25 1346)    Context:  Acute Illness     Findings of the 6 clinical characteristics of malnutrition:  Energy Intake:  No decrease in energy intake  Weight Loss:  No weight loss     Body Fat Loss:  No body fat loss     Muscle Mass Loss:  No muscle mass loss    Fluid Accumulation:  Moderate to Severe Extremities   Strength:       Nutrition Assessment:    Received consult for CHF educatin.  Patient's diet is to be modified to include Carb Control along with MONTSE.  Attempted visit and instruction, but pt sound asleep snoring at time of visit.  Will follow in a.m for diet hx and education.  [facility]  111-343.  BNP 3552.    Nutrition Related Findings:      Wound Type: Multiple, Diabetic Ulcer       Current Nutrition Intake & Therapies:    Average Meal Intake: %  Average Supplements Intake: None Ordered  ADULT DIET; Regular; 4 carb choices (60 gm/meal); No Added Salt (3-4 gm)    Anthropometric Measures:  Height: 182.9 cm (6' 0.01\")  Ideal Body Weight (IBW): 178 lbs (81 kg)    Admission Body Weight: 136.5 kg (300 lb 14.9 oz)  Current Body Weight: 136.5 kg (300 lb 14.9 oz), 169.1 % IBW.    Current BMI (kg/m2): 40.8  Usual Body Weight: 124.7 kg (274 lb 14.6 oz) (2/21/2025)  % Weight Change (Calculated): 9.5  Weight Adjustment For: No Adjustment  BMI Categories: Obese Class 3 (BMI 40.0 or greater)    Estimated Daily Nutrient Needs:  Energy Requirements Based On: Kcal/kg  Weight Used for Energy Requirements: Current  Energy (kcal/day): 2317-2879 kcals (8-15 kcals/kg)  Weight Used for Protein Requirements: Ideal  Protein (g/day): 162g  Method Used for Fluid Requirements: 1 ml/kcal  Fluid (ml/day): 9359-8363 ml    Nutrition Diagnosis:    Altered nutrition-related lab values related to endocrine dysfunction, cardiac dysfunction as evidenced by lab values    Nutrition Interventions:   Food and/or Nutrient Delivery: Continue Current Diet  Nutrition Education/Counseling: Education/Counseling needed  Coordination of Nutrition Care: Continue to monitor while inpatient       Goals:  Goals: Meet at least 75% of estimated needs, PO intake 50% or greater, Teach back diet education  Type of Goal: New goal       Nutrition Monitoring and Evaluation:   Behavioral-Environmental Outcomes: Readiness for Change, Beliefs and Attitudes  Food/Nutrient Intake Outcomes: Food and Nutrient Intake  Physical Signs/Symptoms Outcomes: Biochemical Data, Fluid Status or Edema, Weight, Skin    Discharge Planning:    Continue current diet     Deidre Azevedo, MS, RD, LD  Contact: [phone]

## 2025-03-11 NOTE — PROGRESS NOTES
Physical Therapy     03/11/25 1100   Subjective   Subjective pt declined up with therapy due to pain.   Pain   Pre-Pain 8   Post-Pain 8   Pain Location Left  (heel)   Assessment   Assessment tx focused on pt education regarding L boot in room. pt and pt wife stated that dr had placed order for offloading boot at [facility] prior to admitting to hospital. visual demonstration of TTWB with RW for stand pivot transfer until new offloading boot can be obtained after DC. reviewed importance of elevation and visually checking wound each day for changes in condition. nursing notified of pt need for wound care assessment before DC and see if new boot can be ordered before leaving that will offload weight in L heel. pt currently has a boot that is given to pt with fx- not offloading.   Patient Education   Education Given To Patient;Family;Caregiver   Education Provided Transfer Training;Fall Prevention Strategies;Role of Therapy   Education Provided Comments reviewed basics of wound care and importance of obtaining offloading shoe for L foot due to size and pain of L heel wound.   PT Plan of Care   Tuesday X   Safety Devices   Type of Devices Call light within reach;Nurse notified     Electronically signed by Per Damico PTA on 3/11/2025 at 11:11 AM

## 2025-03-11 NOTE — PROGRESS NOTES
[facility]      Patient:  Cholo Bhakta  YOB: 1955  Date of Service: 3/11/2025  MRN: [id number]   Acct: [id number]   Primary Care Physician: Nirmal Nuno MD  Advance Directive: Full Code  Admit Date: 3/6/2025       Hospital Day: 5  Portions of this note have been copied forward, however, changed to reflect the most current clinical status of this patient.    CHIEF COMPLAINT shortness of breath    Cumulative Hospital course  69-year-old male with CAD, s/p CABG, systolic heart failure, type II DM, chronic diabetic foot wound follows outpatient podiatry, CKD 4, permanent AF on Eliquis, morbid obesity, with complaints of shortness of breath.  Patient was recently admitted on 2/21 due to acute on chronic systolic heart failure at that time he diuresed well and was able to discharge home in stable condition on 2/24.  Returns on 3/6 due to worsening shortness of breath.  Stated that he had had worsening abdominal swelling and lower extremity edema and noted a 20 pound weight gain. Workup in ER CXR moderate edema bilateral small effusions left greater than right, K5.2 creatinine 3.3 BUN 94, BNP 8365.  Admitted to hospitalist service due to acute on chronic systolic heart failure.  Initiated on IV diuretics and nephrology consulted due to decline in renal function.  Cardiology consulted to assist with fluid management underwent limited 2D echo for LV evaluation currently EF 45 to 50% with mild global hypokinesis.  Has noted improvement since January.  Recommending continuing diuretics and has signed off.  In addition he reports that he follows with podiatry Dr. Heaton in [place] and underwent a wound debridement. Initially started on Doxycycline. Wound culture obtained Bacteroides fragilis, Morganella morganii, Proteus mirabilis, Corynebacterium, Enterococcus faecalis, Finegoldia magna, Staphylococcus aureus.  Initiated on meropenem and vancomycin.  Wound care following and dressing changes  care following    Culture from podiatry office reviewed   IV Merrem, Vancomycin initiated      Gout   Allopurinol    No signs of acute exacerbation     GERD (gastroesophageal reflux disease)   PPI     BPH (benign prostatic hyperplasia)   Flomax     Neuropathy   Lyrica three times daily     Palliative care patient       Antibiotic: Meropenem and vancomycin    DVT Prophylaxis:Amber Esposito, APRN - CNP, 3/11/2025 9:53 AM

## 2025-03-11 NOTE — PROGRESS NOTES
[facility]   Pharmacy Pharmacokinetic Monitoring Service - Vancomycin    Consulting Provider: CORI Fried   Indication: SSTI diabetic foot  Target Concentration: Dosing based on anticipated concentration <15 mg/L due to renal impairment/insufficiency  Day of Therapy: 2  Additional Antimicrobials: Merrem    Pertinent Laboratory Values:   Wt Readings from Last 1 Encounters:   03/08/25 (!) 136.5 kg (301 lb)     Temp Readings from Last 1 Encounters:   03/11/25 96.8 °F (36 °C) (Temporal)     Estimated Creatinine Clearance: 28 mL/min (A) (based on SCr of 3.6 mg/dL (H)).  Recent Labs     03/10/25  0210 03/11/25  0307   CREATININE 3.3* 3.6*   BUN 81* 78*   WBC 6.8 6.4     Procalcitonin:     Pertinent Cultures:  Culture Date Source Results   PTA Wound Bacteroides fragilis, Morganella morganii, Proteus mirabilis, Corynebacterium, Enterococcus faecalis, Finegoldia magna, Staphylococcus aureus.   MRSA Nasal Swab: N/A. Non-respiratory infection.      Recent vancomycin administrations                     vancomycin (VANCOCIN) 2,500 mg in sodium chloride 0.9 % 500 mL IVPB (mg) 2,500 mg New Bag 03/10/25 5751                    Assessment:  Date/Time Current Dose Concentration Timing of Concentration (h) AUC   03/11/25 @ 1715 2500 mg x 1 load dose 13.3 23 hrs N/A   Note: Serum concentrations collected for AUC dosing may appear elevated if collected in close proximity to the dose administered, this is not necessarily an indication of toxicity    Plan:  Continue to pulse dose for level <15 due to renal impairment. Patient does not want to start dialysis yet,  Give 2000 mg (14.6mg/kg) x 1 tonight  Repeat vancomycin concentration ordered for 24 hrs post dose @ 03/12/25 2000   Pharmacy will continue to monitor patient and adjust therapy as indicated    Thank you for the consult,  Carlos Man RPH  3/11/2025 6:21 PM

## 2025-03-11 NOTE — PROGRESS NOTES
Occupational Therapy  Pt sitting up in recliner with wife present. Pt declines therapy at this time. Pt states I can't wear my walking boot because it hurts my left foot. Wife states in a previous doctors appointment in [place], the doctor gave a prescription for a specialty boot to be made at [facility] ([facility]). Patient ended up in the hospital and this appointment with [facility] was never made. Wife voices concern about if [facility] will even pay for the specialty boot or if the cost will have to come out of pocket. Wife provided with [facility] phone number to get the answers she is looking for. Per (PTA) present and educated patient on other ways to be mobile while keeping weight off of his LLE. Pt continues to decline mobility at this time. Spoke with nurse Torres about consult with wound care nurse Roselyn on what type of boot they may be able to provide, or what should be done at this time. Will continue to follow. Electronically signed by DARCY Almeida on 3/11/2025 at 11:06 AM

## 2025-03-12 ENCOUNTER — APPOINTMENT (OUTPATIENT)
Dept: GENERAL RADIOLOGY | Age: 70
End: 2025-03-12
Payer: MEDICARE

## 2025-03-12 LAB
ALBUMIN SERPL-MCNC: 3.5 G/DL (ref 3.5–5.2)
ALP SERPL-CCNC: 127 U/L (ref 40–129)
ALT SERPL-CCNC: 16 U/L (ref 10–50)
ANION GAP SERPL CALCULATED.3IONS-SCNC: 10 MMOL/L (ref 8–16)
ANION GAP SERPL CALCULATED.3IONS-SCNC: 13 MMOL/L (ref 8–16)
APTT PPP: 48.2 SEC (ref 26–36.2)
AST SERPL-CCNC: 26 U/L (ref 10–50)
BASOPHILS # BLD: 0.1 K/UL (ref 0–0.2)
BASOPHILS # BLD: 0.1 K/UL (ref 0–0.2)
BASOPHILS NFR BLD: 0.8 % (ref 0–1)
BASOPHILS NFR BLD: 1.1 % (ref 0–1)
BILIRUB SERPL-MCNC: 0.3 MG/DL (ref 0.2–1.2)
BNP BLD-MCNC: 4780 PG/ML (ref 0–124)
BNP BLD-MCNC: 4891 PG/ML (ref 0–124)
BUN SERPL-MCNC: 76 MG/DL (ref 8–23)
BUN SERPL-MCNC: 77 MG/DL (ref 8–23)
CALCIUM SERPL-MCNC: 9.1 MG/DL (ref 8.8–10.2)
CALCIUM SERPL-MCNC: 9.3 MG/DL (ref 8.8–10.2)
CHLORIDE SERPL-SCNC: 104 MMOL/L (ref 98–107)
CHLORIDE SERPL-SCNC: 105 MMOL/L (ref 98–107)
CO2 SERPL-SCNC: 25 MMOL/L (ref 22–29)
CO2 SERPL-SCNC: 29 MMOL/L (ref 22–29)
CREAT SERPL-MCNC: 3.7 MG/DL (ref 0.7–1.2)
CREAT SERPL-MCNC: 3.7 MG/DL (ref 0.7–1.2)
D DIMER PPP FEU-MCNC: 1.49 UG/ML FEU (ref 0–0.48)
EOSINOPHIL # BLD: 0.5 K/UL (ref 0–0.6)
EOSINOPHIL # BLD: 0.6 K/UL (ref 0–0.6)
EOSINOPHIL NFR BLD: 6.4 % (ref 0–5)
EOSINOPHIL NFR BLD: 9.3 % (ref 0–5)
ERYTHROCYTE [DISTWIDTH] IN BLOOD BY AUTOMATED COUNT: 14.9 % (ref 11.5–14.5)
ERYTHROCYTE [DISTWIDTH] IN BLOOD BY AUTOMATED COUNT: 15.2 % (ref 11.5–14.5)
GLUCOSE BLD-MCNC: 103 MG/DL (ref 70–99)
GLUCOSE BLD-MCNC: 113 MG/DL (ref 70–99)
GLUCOSE BLD-MCNC: 68 MG/DL (ref 70–99)
GLUCOSE BLD-MCNC: 85 MG/DL (ref 70–99)
GLUCOSE BLD-MCNC: 90 MG/DL (ref 70–99)
GLUCOSE SERPL-MCNC: 63 MG/DL (ref 70–99)
GLUCOSE SERPL-MCNC: 98 MG/DL (ref 70–99)
HCT VFR BLD AUTO: 29.6 % (ref 42–52)
HCT VFR BLD AUTO: 29.9 % (ref 42–52)
HGB BLD-MCNC: 9.1 G/DL (ref 14–18)
HGB BLD-MCNC: 9.3 G/DL (ref 14–18)
IMM GRANULOCYTES # BLD: 0 K/UL
IMM GRANULOCYTES # BLD: 0 K/UL
INR PPP: 1.41 (ref 0.88–1.18)
LYMPHOCYTES # BLD: 0.8 K/UL (ref 1.1–4.5)
LYMPHOCYTES # BLD: 1 K/UL (ref 1.1–4.5)
LYMPHOCYTES NFR BLD: 10.8 % (ref 20–40)
LYMPHOCYTES NFR BLD: 15.5 % (ref 20–40)
MAGNESIUM SERPL-MCNC: 2.4 MG/DL (ref 1.6–2.4)
MCH RBC QN AUTO: 29 PG (ref 27–31)
MCH RBC QN AUTO: 29.1 PG (ref 27–31)
MCHC RBC AUTO-ENTMCNC: 30.7 G/DL (ref 33–37)
MCHC RBC AUTO-ENTMCNC: 31.1 G/DL (ref 33–37)
MCV RBC AUTO: 93.4 FL (ref 80–94)
MCV RBC AUTO: 94.3 FL (ref 80–94)
MONOCYTES # BLD: 0.6 K/UL (ref 0–0.9)
MONOCYTES # BLD: 0.6 K/UL (ref 0–0.9)
MONOCYTES NFR BLD: 8.2 % (ref 0–10)
MONOCYTES NFR BLD: 9.8 % (ref 0–10)
NEUTROPHILS # BLD: 4.1 K/UL (ref 1.5–7.5)
NEUTROPHILS # BLD: 5.7 K/UL (ref 1.5–7.5)
NEUTS SEG NFR BLD: 63.8 % (ref 50–65)
NEUTS SEG NFR BLD: 73.5 % (ref 50–65)
PERFORMED ON: ABNORMAL
PERFORMED ON: NORMAL
PERFORMED ON: NORMAL
PHOSPHATE SERPL-MCNC: 4.7 MG/DL (ref 2.5–4.5)
PLATELET # BLD AUTO: 231 K/UL (ref 130–400)
PLATELET # BLD AUTO: 247 K/UL (ref 130–400)
PMV BLD AUTO: 11.8 FL (ref 9.4–12.4)
PMV BLD AUTO: 12 FL (ref 9.4–12.4)
POTASSIUM SERPL-SCNC: 4.2 MMOL/L (ref 3.5–5)
POTASSIUM SERPL-SCNC: 4.4 MMOL/L (ref 3.5–5)
PROT SERPL-MCNC: 6.4 G/DL (ref 6.4–8.3)
PROTHROMBIN TIME: 16.9 SEC (ref 12–14.6)
RBC # BLD AUTO: 3.14 M/UL (ref 4.7–6.1)
RBC # BLD AUTO: 3.2 M/UL (ref 4.7–6.1)
SODIUM SERPL-SCNC: 142 MMOL/L (ref 136–145)
SODIUM SERPL-SCNC: 144 MMOL/L (ref 136–145)
TROPONIN, HIGH SENSITIVITY: 80 NG/L (ref 0–22)
TROPONIN, HIGH SENSITIVITY: 82 NG/L (ref 0–22)
VANCOMYCIN SERPL-MCNC: 19.8 UG/ML
WBC # BLD AUTO: 6.4 K/UL (ref 4.8–10.8)
WBC # BLD AUTO: 7.8 K/UL (ref 4.8–10.8)

## 2025-03-12 PROCEDURE — 94760 N-INVAS EAR/PLS OXIMETRY 1: CPT

## 2025-03-12 PROCEDURE — 83880 ASSAY OF NATRIURETIC PEPTIDE: CPT

## 2025-03-12 PROCEDURE — 74018 RADEX ABDOMEN 1 VIEW: CPT

## 2025-03-12 PROCEDURE — 2580000003 HC RX 258

## 2025-03-12 PROCEDURE — 6370000000 HC RX 637 (ALT 250 FOR IP): Performed by: INTERNAL MEDICINE

## 2025-03-12 PROCEDURE — 2700000000 HC OXYGEN THERAPY PER DAY

## 2025-03-12 PROCEDURE — 83735 ASSAY OF MAGNESIUM: CPT

## 2025-03-12 PROCEDURE — 6370000000 HC RX 637 (ALT 250 FOR IP)

## 2025-03-12 PROCEDURE — 80202 ASSAY OF VANCOMYCIN: CPT

## 2025-03-12 PROCEDURE — 6360000002 HC RX W HCPCS

## 2025-03-12 PROCEDURE — 1200000000 HC SEMI PRIVATE

## 2025-03-12 PROCEDURE — 6370000000 HC RX 637 (ALT 250 FOR IP): Performed by: NURSE PRACTITIONER

## 2025-03-12 PROCEDURE — 94660 CPAP INITIATION&MGMT: CPT

## 2025-03-12 PROCEDURE — 85379 FIBRIN DEGRADATION QUANT: CPT

## 2025-03-12 PROCEDURE — 6360000002 HC RX W HCPCS: Performed by: HOSPITALIST

## 2025-03-12 PROCEDURE — 80053 COMPREHEN METABOLIC PANEL: CPT

## 2025-03-12 PROCEDURE — 80048 BASIC METABOLIC PNL TOTAL CA: CPT

## 2025-03-12 PROCEDURE — 36415 COLL VENOUS BLD VENIPUNCTURE: CPT

## 2025-03-12 PROCEDURE — 84484 ASSAY OF TROPONIN QUANT: CPT

## 2025-03-12 PROCEDURE — 6370000000 HC RX 637 (ALT 250 FOR IP): Performed by: HOSPITALIST

## 2025-03-12 PROCEDURE — 6360000002 HC RX W HCPCS: Performed by: NURSE PRACTITIONER

## 2025-03-12 PROCEDURE — 82962 GLUCOSE BLOOD TEST: CPT

## 2025-03-12 PROCEDURE — 84100 ASSAY OF PHOSPHORUS: CPT

## 2025-03-12 PROCEDURE — 93005 ELECTROCARDIOGRAM TRACING: CPT | Performed by: INTERNAL MEDICINE

## 2025-03-12 PROCEDURE — 85730 THROMBOPLASTIN TIME PARTIAL: CPT

## 2025-03-12 PROCEDURE — 85025 COMPLETE CBC W/AUTO DIFF WBC: CPT

## 2025-03-12 PROCEDURE — 5A09357 ASSISTANCE WITH RESPIRATORY VENTILATION, LESS THAN 24 CONSECUTIVE HOURS, CONTINUOUS POSITIVE AIRWAY PRESSURE: ICD-10-PCS | Performed by: INTERNAL MEDICINE

## 2025-03-12 PROCEDURE — 85610 PROTHROMBIN TIME: CPT

## 2025-03-12 RX ORDER — MORPHINE SULFATE 2 MG/ML
1 INJECTION, SOLUTION INTRAMUSCULAR; INTRAVENOUS ONCE
Status: COMPLETED | OUTPATIENT
Start: 2025-03-12 | End: 2025-03-12

## 2025-03-12 RX ORDER — NITROGLYCERIN 0.4 MG/1
TABLET SUBLINGUAL
Status: COMPLETED
Start: 2025-03-12 | End: 2025-03-12

## 2025-03-12 RX ORDER — ASPIRIN 81 MG/1
324 TABLET, CHEWABLE ORAL ONCE
Status: COMPLETED | OUTPATIENT
Start: 2025-03-12 | End: 2025-03-12

## 2025-03-12 RX ORDER — MORPHINE SULFATE 2 MG/ML
INJECTION, SOLUTION INTRAMUSCULAR; INTRAVENOUS
Status: DISPENSED
Start: 2025-03-12 | End: 2025-03-13

## 2025-03-12 RX ORDER — INSULIN GLARGINE 100 [IU]/ML
45 INJECTION, SOLUTION SUBCUTANEOUS NIGHTLY
Status: DISCONTINUED | OUTPATIENT
Start: 2025-03-12 | End: 2025-03-13

## 2025-03-12 RX ADMIN — CALCITRIOL CAPSULES 0.25 MCG 0.25 MCG: 0.25 CAPSULE ORAL at 07:53

## 2025-03-12 RX ADMIN — DAKIN'S SOLUTION 0.125% (QUARTER STRENGTH): 0.12 SOLUTION at 04:50

## 2025-03-12 RX ADMIN — DONEPEZIL HYDROCHLORIDE 10 MG: 5 TABLET ORAL at 07:53

## 2025-03-12 RX ADMIN — LACTULOSE 20 G: 20 SOLUTION ORAL at 21:06

## 2025-03-12 RX ADMIN — APIXABAN 5 MG: 5 TABLET, FILM COATED ORAL at 07:53

## 2025-03-12 RX ADMIN — SENNOSIDES AND DOCUSATE SODIUM 1 TABLET: 50; 8.6 TABLET ORAL at 07:54

## 2025-03-12 RX ADMIN — LACTULOSE 20 G: 20 SOLUTION ORAL at 09:00

## 2025-03-12 RX ADMIN — SUCRALFATE 1 G: 1 TABLET ORAL at 21:06

## 2025-03-12 RX ADMIN — NITROGLYCERIN: 0.4 TABLET SUBLINGUAL at 20:31

## 2025-03-12 RX ADMIN — PREGABALIN 100 MG: 50 CAPSULE ORAL at 07:53

## 2025-03-12 RX ADMIN — IRON SUCROSE 200 MG: 20 INJECTION, SOLUTION INTRAVENOUS at 10:18

## 2025-03-12 RX ADMIN — MEROPENEM 1000 MG: 1 INJECTION INTRAVENOUS at 04:50

## 2025-03-12 RX ADMIN — BUMETANIDE 2 MG: 0.25 INJECTION INTRAMUSCULAR; INTRAVENOUS at 07:53

## 2025-03-12 RX ADMIN — ASPIRIN 324 MG: 81 TABLET, CHEWABLE ORAL at 21:02

## 2025-03-12 RX ADMIN — METOPROLOL SUCCINATE 50 MG: 50 TABLET, FILM COATED, EXTENDED RELEASE ORAL at 07:54

## 2025-03-12 RX ADMIN — MEROPENEM 1000 MG: 1 INJECTION INTRAVENOUS at 17:33

## 2025-03-12 RX ADMIN — SUCRALFATE 1 G: 1 TABLET ORAL at 17:30

## 2025-03-12 RX ADMIN — DAKIN'S SOLUTION 0.125% (QUARTER STRENGTH): 0.12 SOLUTION at 07:54

## 2025-03-12 RX ADMIN — LACTULOSE 20 G: 20 SOLUTION ORAL at 14:15

## 2025-03-12 RX ADMIN — PREGABALIN 100 MG: 50 CAPSULE ORAL at 21:06

## 2025-03-12 RX ADMIN — ONDANSETRON 4 MG: 2 INJECTION INTRAMUSCULAR; INTRAVENOUS at 11:16

## 2025-03-12 RX ADMIN — TAMSULOSIN HYDROCHLORIDE 0.4 MG: 0.4 CAPSULE ORAL at 07:54

## 2025-03-12 RX ADMIN — BUSPIRONE HYDROCHLORIDE 10 MG: 10 TABLET ORAL at 21:06

## 2025-03-12 RX ADMIN — DAKIN'S SOLUTION 0.125% (QUARTER STRENGTH): 0.12 SOLUTION at 23:47

## 2025-03-12 RX ADMIN — HYDRALAZINE HYDROCHLORIDE 10 MG: 10 TABLET ORAL at 10:18

## 2025-03-12 RX ADMIN — ALLOPURINOL 200 MG: 100 TABLET ORAL at 07:54

## 2025-03-12 RX ADMIN — HYDRALAZINE HYDROCHLORIDE 10 MG: 10 TABLET ORAL at 17:30

## 2025-03-12 RX ADMIN — ISOSORBIDE DINITRATE 10 MG: 10 TABLET ORAL at 14:58

## 2025-03-12 RX ADMIN — SUCRALFATE 1 G: 1 TABLET ORAL at 14:14

## 2025-03-12 RX ADMIN — ISOSORBIDE DINITRATE 10 MG: 10 TABLET ORAL at 07:53

## 2025-03-12 RX ADMIN — BUMETANIDE 2 MG: 0.25 INJECTION INTRAMUSCULAR; INTRAVENOUS at 17:30

## 2025-03-12 RX ADMIN — SUCRALFATE 1 G: 1 TABLET ORAL at 07:54

## 2025-03-12 RX ADMIN — APIXABAN 5 MG: 5 TABLET, FILM COATED ORAL at 21:06

## 2025-03-12 RX ADMIN — OXYCODONE HYDROCHLORIDE 10 MG: 10 TABLET ORAL at 09:32

## 2025-03-12 RX ADMIN — BUSPIRONE HYDROCHLORIDE 10 MG: 10 TABLET ORAL at 14:15

## 2025-03-12 RX ADMIN — PANTOPRAZOLE SODIUM 40 MG: 40 TABLET, DELAYED RELEASE ORAL at 07:54

## 2025-03-12 RX ADMIN — SENNOSIDES AND DOCUSATE SODIUM 1 TABLET: 50; 8.6 TABLET ORAL at 21:05

## 2025-03-12 RX ADMIN — INSULIN GLARGINE 45 UNITS: 100 INJECTION, SOLUTION SUBCUTANEOUS at 21:06

## 2025-03-12 RX ADMIN — MORPHINE SULFATE 1 MG: 2 INJECTION, SOLUTION INTRAMUSCULAR; INTRAVENOUS at 20:41

## 2025-03-12 RX ADMIN — ISOSORBIDE DINITRATE 10 MG: 10 TABLET ORAL at 21:05

## 2025-03-12 RX ADMIN — POLYETHYLENE GLYCOL 3350 17 G: 17 POWDER, FOR SOLUTION ORAL at 07:54

## 2025-03-12 RX ADMIN — ROSUVASTATIN CALCIUM 10 MG: 20 TABLET, FILM COATED ORAL at 21:06

## 2025-03-12 ASSESSMENT — ENCOUNTER SYMPTOMS
VOMITING: 0
NAUSEA: 1
SHORTNESS OF BREATH: 1

## 2025-03-12 ASSESSMENT — PAIN SCALES - GENERAL: PAINLEVEL_OUTOF10: 4

## 2025-03-12 NOTE — PROGRESS NOTES
[facility]      Patient:  Cholo Bhakta  YOB: 1955  Date of Service: 3/12/2025  MRN: [id number]   Acct: [id number]   Primary Care Physician: Nirmal Nuno MD  Advance Directive: Full Code  Admit Date: 3/6/2025       Hospital Day: 6  Portions of this note have been copied forward, however, changed to reflect the most current clinical status of this patient.    CHIEF COMPLAINT shortness of breath    Cumulative Hospital course  69-year-old male with CAD, s/p CABG, systolic heart failure, type II DM, chronic diabetic foot wound follows outpatient podiatry, CKD 4, permanent AF on Eliquis, morbid obesity, NOBLE on BIPAP, with complaints of shortness of breath.  Patient was recently admitted on 2/21 due to acute on chronic systolic heart failure at that time he diuresed well and was able to discharge home in stable condition on 2/24.  Returns on 3/6 due to worsening shortness of breath.  Stated that he had had worsening abdominal swelling and lower extremity edema and noted a 20 pound weight gain. Workup in ER CXR moderate edema bilateral small effusions left greater than right, K5.2 creatinine 3.3 BUN 94, BNP 8365.  Admitted to hospitalist service due to acute on chronic systolic heart failure.  Initiated on IV diuretics and nephrology consulted due to decline in renal function.  Cardiology consulted to assist with fluid management underwent limited 2D echo for LV evaluation currently EF 45 to 50% with mild global hypokinesis.  Has noted improvement since January.  Recommending continuing diuretics and has signed off.  In addition he reports that he follows with podiatry Dr. Heaton in [place] and underwent a wound debridement. Initially started on Doxycycline. Wound culture obtained Bacteroides fragilis, Morganella morganii, Proteus mirabilis, Corynebacterium, Enterococcus faecalis, Finegoldia magna, Staphylococcus aureus.  Initiated on meropenem and vancomycin.  Wound care following and dressing  sonographic appearance of the kidneys and urinary bladder.    ______________________________________ Electronically signed by: ISMA ANSARI M.D. Date:     03/07/2025 Time:    16:24     XR CHEST PORTABLE  Result Date: 3/6/2025  1.  Mild to moderate edema and small bilateral effusions, left greater than right.    ______________________________________ Electronically signed by: OLIVIA REDDY M.D. Date:     03/06/2025 Time:    11:39        Echo:     Left Ventricle: Mildly reduced left ventricular systolic function with a visually estimated EF of 45 - 50%. EF by visual approximation is 45%. Left ventricle size is normal. Moderately increased wall thickness. Mild global hypokinesis present.    Left Atrium: Left atrium is dilated.     Limited echocardiography for LV function.    Assessment/Plan   Principal Problem:    Acute on chronic congestive heart failure, unspecified heart failure type    - Cardiology consulted, has signed off    Dietitian consulted  - ECHO limited EF 45-50%, mild global hypokinesis               - IV Bumex twice daily               - Low-sodium diet              - Monitor I's and O's closely              - Daily weights              - Monitor on telemetry   Net -9L   Active Problems:    CKD (chronic kidney disease) stage 4, GFR 15-29 ml/min        - Nephrology following  Discussed with patient and family wish to monitor labs at this time              - Monitor I's and O's closely              - Monitor labs closely    Essential hypertension   Currently 166/94   Monitor vital signs   Permanent Atrial Fibrillation    Currently rate controlled AF 74    Resumed Eliquis    Monitor on telemetry     Diabetes mellitus  -Accucheck  -A1c 9   -Sliding scale insulin   Lantus 45 units nightly   -Hypoglycemia protocol as warranted      CAD (coronary artery disease)   S/p CABG 2015  last catheterization 2/8/2021 with occlusive native vessel disease, patent grafts with LIMA to LAD, SVG to diagonal, SVG to OM, SVG to  RCA   Toprol xl, statin therapy     Diabetic ulcer of left foot associated with type 2 diabetes mellitus    Wound care following    Culture from podiatry office reviewed   IV Merrem, Vancomycin initiated      Gout   Allopurinol    No signs of acute exacerbation     GERD (gastroesophageal reflux disease)   PPI     BPH (benign prostatic hyperplasia)   Flomax     Neuropathy   Lyrica three times daily   Abdominal distention    KUB ruled out obstruction    Scheduled bowel regimen    Enema ordered     Antibiotic: Meropenem and vancomycin    DVT Prophylaxis:Amber Esposito, APRN - CNP, 3/12/2025 2:15 PM

## 2025-03-12 NOTE — PLAN OF CARE
Nutrition Problem #1: Altered nutrition-related lab values, Inadequate oral intake  Intervention: Food and/or Nutrient Delivery: Continue Current Diet  Nutritional
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
## 2025-03-12 NOTE — PROGRESS NOTES
Comprehensive Nutrition Assessment    Type and Reason for Visit:  Consult    Nutrition Recommendations/Plan:   Continue to follow for diet education     Malnutrition Assessment:  Malnutrition Status:  At risk for malnutrition (03/12/25 1237)    Context:  Acute Illness     Findings of the 6 clinical characteristics of malnutrition:  Energy Intake:  Mild decrease in energy intake  Weight Loss:  No weight loss     Body Fat Loss:  No body fat loss     Muscle Mass Loss:  No muscle mass loss    Fluid Accumulation:  Moderate to Severe Extremities   Strength:       Nutrition Assessment:    Received consuslt for diet education for CHF and Renal disease.  Pt was sleeping but did wake up when name was called.  Refusing lunch and feeling sick and nauseated and did not want to talk.  Will try again later.  Intake has been good until today with intake ranging %.  BUN 77., Creat 3.7, GFR 17.,  Accuchek's     Nutrition Related Findings:    +1 RUE edema., +2 pitting BLE edema Wound Type: Multiple, Diabetic Ulcer       Current Nutrition Intake & Therapies:    Average Meal Intake: %, 0%  Average Supplements Intake: None Ordered  ADULT DIET; Regular; 4 carb choices (60 gm/meal); No Added Salt (3-4 gm)    Anthropometric Measures:  Height: 182.9 cm (6' 0.01\")  Ideal Body Weight (IBW): 178 lbs (81 kg)    Admission Body Weight: 136.5 kg (300 lb 14.9 oz)  Current Body Weight: 136.5 kg (300 lb 14.9 oz), 169.1 % IBW.    Current BMI (kg/m2): 40.8  Usual Body Weight: 124.7 kg (274 lb 14.6 oz) (2/21/2025)  % Weight Change (Calculated): 9.5  Weight Adjustment For: No Adjustment  BMI Categories: Obese Class 3 (BMI 40.0 or greater)    Estimated Daily Nutrient Needs:  Energy Requirements Based On: Kcal/kg  Weight Used for Energy Requirements: Current  Energy (kcal/day): 2461-9902 kcals (8-15 kcals/kg)  Weight Used for Protein Requirements: Ideal  Protein (g/day): 162g  Method Used for Fluid Requirements: 1 ml/kcal  Fluid  (ml/day): 7019-8354 ml    Nutrition Diagnosis:   Altered nutrition-related lab values, Inadequate oral intake related to renal dysfunction, endocrine dysfunction, nausea/vomiting/diarrhea, increase demand for energy/nutrients as evidenced by lab values, intake 0-25%, wounds    Nutrition Interventions:   Food and/or Nutrient Delivery: Continue Current Diet  Nutrition Education/Counseling: Education/Counseling needed  Coordination of Nutrition Care: Continue to monitor while inpatient       Goals:  Goals: Meet at least 75% of estimated needs, PO intake 50% or greater, Teach back diet education  Type of Goal: Continue current goal  Previous Goal Met: No Progress toward Goal(s)    Nutrition Monitoring and Evaluation:   Behavioral-Environmental Outcomes: Readiness for Change, Beliefs and Attitudes  Food/Nutrient Intake Outcomes: Food and Nutrient Intake  Physical Signs/Symptoms Outcomes: Biochemical Data, Nausea or Vomiting, Fluid Status or Edema, Weight, Skin    Discharge Planning:    Continue current diet     Deidre zAevedo, MS, RD, LD  Contact: [phone]

## 2025-03-13 LAB
ANION GAP SERPL CALCULATED.3IONS-SCNC: 11 MMOL/L (ref 8–16)
BASOPHILS # BLD: 0.1 K/UL (ref 0–0.2)
BASOPHILS NFR BLD: 1.1 % (ref 0–1)
BUN SERPL-MCNC: 76 MG/DL (ref 8–23)
CALCIUM SERPL-MCNC: 9.3 MG/DL (ref 8.8–10.2)
CHLORIDE SERPL-SCNC: 105 MMOL/L (ref 98–107)
CO2 SERPL-SCNC: 27 MMOL/L (ref 22–29)
CREAT SERPL-MCNC: 3.6 MG/DL (ref 0.7–1.2)
EOSINOPHIL # BLD: 0.5 K/UL (ref 0–0.6)
EOSINOPHIL NFR BLD: 8.2 % (ref 0–5)
ERYTHROCYTE [DISTWIDTH] IN BLOOD BY AUTOMATED COUNT: 15.1 % (ref 11.5–14.5)
GLUCOSE BLD-MCNC: 175 MG/DL (ref 70–99)
GLUCOSE BLD-MCNC: 177 MG/DL (ref 70–99)
GLUCOSE BLD-MCNC: 186 MG/DL (ref 70–99)
GLUCOSE BLD-MCNC: 56 MG/DL (ref 70–99)
GLUCOSE BLD-MCNC: 84 MG/DL (ref 70–99)
GLUCOSE BLD-MCNC: 98 MG/DL (ref 70–99)
GLUCOSE SERPL-MCNC: 87 MG/DL (ref 70–99)
HCT VFR BLD AUTO: 27.2 % (ref 42–52)
HGB BLD-MCNC: 8.6 G/DL (ref 14–18)
IMM GRANULOCYTES # BLD: 0 K/UL
LYMPHOCYTES # BLD: 1 K/UL (ref 1.1–4.5)
LYMPHOCYTES NFR BLD: 15.5 % (ref 20–40)
MCH RBC QN AUTO: 29.4 PG (ref 27–31)
MCHC RBC AUTO-ENTMCNC: 31.6 G/DL (ref 33–37)
MCV RBC AUTO: 92.8 FL (ref 80–94)
MONOCYTES # BLD: 0.6 K/UL (ref 0–0.9)
MONOCYTES NFR BLD: 10 % (ref 0–10)
NEUTROPHILS # BLD: 4.1 K/UL (ref 1.5–7.5)
NEUTS SEG NFR BLD: 64.9 % (ref 50–65)
PERFORMED ON: ABNORMAL
PERFORMED ON: NORMAL
PERFORMED ON: NORMAL
PLATELET # BLD AUTO: 216 K/UL (ref 130–400)
PMV BLD AUTO: 11.5 FL (ref 9.4–12.4)
POTASSIUM SERPL-SCNC: 3.9 MMOL/L (ref 3.5–5)
RBC # BLD AUTO: 2.93 M/UL (ref 4.7–6.1)
SODIUM SERPL-SCNC: 143 MMOL/L (ref 136–145)
WBC # BLD AUTO: 6.4 K/UL (ref 4.8–10.8)

## 2025-03-13 PROCEDURE — 2580000003 HC RX 258

## 2025-03-13 PROCEDURE — 6370000000 HC RX 637 (ALT 250 FOR IP): Performed by: INTERNAL MEDICINE

## 2025-03-13 PROCEDURE — 85025 COMPLETE CBC W/AUTO DIFF WBC: CPT

## 2025-03-13 PROCEDURE — 97530 THERAPEUTIC ACTIVITIES: CPT

## 2025-03-13 PROCEDURE — 94660 CPAP INITIATION&MGMT: CPT

## 2025-03-13 PROCEDURE — 36415 COLL VENOUS BLD VENIPUNCTURE: CPT

## 2025-03-13 PROCEDURE — 2500000003 HC RX 250 WO HCPCS

## 2025-03-13 PROCEDURE — 6360000002 HC RX W HCPCS

## 2025-03-13 PROCEDURE — 2700000000 HC OXYGEN THERAPY PER DAY

## 2025-03-13 PROCEDURE — 80048 BASIC METABOLIC PNL TOTAL CA: CPT

## 2025-03-13 PROCEDURE — 97116 GAIT TRAINING THERAPY: CPT

## 2025-03-13 PROCEDURE — 6360000002 HC RX W HCPCS: Performed by: NURSE PRACTITIONER

## 2025-03-13 PROCEDURE — 1200000000 HC SEMI PRIVATE

## 2025-03-13 PROCEDURE — 94760 N-INVAS EAR/PLS OXIMETRY 1: CPT

## 2025-03-13 PROCEDURE — 6370000000 HC RX 637 (ALT 250 FOR IP)

## 2025-03-13 PROCEDURE — 97535 SELF CARE MNGMENT TRAINING: CPT

## 2025-03-13 PROCEDURE — 82962 GLUCOSE BLOOD TEST: CPT

## 2025-03-13 PROCEDURE — 6370000000 HC RX 637 (ALT 250 FOR IP): Performed by: NURSE PRACTITIONER

## 2025-03-13 RX ORDER — INSULIN GLARGINE 100 [IU]/ML
35 INJECTION, SOLUTION SUBCUTANEOUS NIGHTLY
Status: DISCONTINUED | OUTPATIENT
Start: 2025-03-13 | End: 2025-03-14 | Stop reason: HOSPADM

## 2025-03-13 RX ADMIN — POLYETHYLENE GLYCOL-3350 AND ELECTROLYTES 4000 ML: 236; 6.74; 5.86; 2.97; 22.74 POWDER, FOR SOLUTION ORAL at 13:43

## 2025-03-13 RX ADMIN — BUMETANIDE 2 MG: 0.25 INJECTION INTRAMUSCULAR; INTRAVENOUS at 17:13

## 2025-03-13 RX ADMIN — METOPROLOL SUCCINATE 50 MG: 50 TABLET, FILM COATED, EXTENDED RELEASE ORAL at 08:46

## 2025-03-13 RX ADMIN — LACTULOSE 20 G: 20 SOLUTION ORAL at 08:45

## 2025-03-13 RX ADMIN — POLYETHYLENE GLYCOL 3350 17 G: 17 POWDER, FOR SOLUTION ORAL at 08:47

## 2025-03-13 RX ADMIN — LACTULOSE 20 G: 20 SOLUTION ORAL at 13:43

## 2025-03-13 RX ADMIN — BUSPIRONE HYDROCHLORIDE 10 MG: 10 TABLET ORAL at 21:11

## 2025-03-13 RX ADMIN — HYDRALAZINE HYDROCHLORIDE 10 MG: 10 TABLET ORAL at 08:46

## 2025-03-13 RX ADMIN — MEROPENEM 1000 MG: 1 INJECTION INTRAVENOUS at 05:12

## 2025-03-13 RX ADMIN — APIXABAN 5 MG: 5 TABLET, FILM COATED ORAL at 08:45

## 2025-03-13 RX ADMIN — PANTOPRAZOLE SODIUM 40 MG: 40 TABLET, DELAYED RELEASE ORAL at 08:45

## 2025-03-13 RX ADMIN — SUCRALFATE 1 G: 1 TABLET ORAL at 21:11

## 2025-03-13 RX ADMIN — ISOSORBIDE DINITRATE 10 MG: 10 TABLET ORAL at 13:43

## 2025-03-13 RX ADMIN — SODIUM CHLORIDE, PRESERVATIVE FREE 10 ML: 5 INJECTION INTRAVENOUS at 08:47

## 2025-03-13 RX ADMIN — GLYCERIN 2 G: 2 SUPPOSITORY RECTAL at 13:48

## 2025-03-13 RX ADMIN — INSULIN GLARGINE 35 UNITS: 100 INJECTION, SOLUTION SUBCUTANEOUS at 21:09

## 2025-03-13 RX ADMIN — ALLOPURINOL 200 MG: 100 TABLET ORAL at 08:45

## 2025-03-13 RX ADMIN — BUSPIRONE HYDROCHLORIDE 10 MG: 10 TABLET ORAL at 13:43

## 2025-03-13 RX ADMIN — ISOSORBIDE DINITRATE 10 MG: 10 TABLET ORAL at 08:45

## 2025-03-13 RX ADMIN — SODIUM CHLORIDE, PRESERVATIVE FREE 10 ML: 5 INJECTION INTRAVENOUS at 21:11

## 2025-03-13 RX ADMIN — ROSUVASTATIN CALCIUM 10 MG: 20 TABLET, FILM COATED ORAL at 21:10

## 2025-03-13 RX ADMIN — PREGABALIN 100 MG: 50 CAPSULE ORAL at 08:46

## 2025-03-13 RX ADMIN — SUCRALFATE 1 G: 1 TABLET ORAL at 13:43

## 2025-03-13 RX ADMIN — APIXABAN 5 MG: 5 TABLET, FILM COATED ORAL at 21:11

## 2025-03-13 RX ADMIN — LACTULOSE 20 G: 20 SOLUTION ORAL at 21:10

## 2025-03-13 RX ADMIN — Medication 12.5 MG: at 08:54

## 2025-03-13 RX ADMIN — PREGABALIN 100 MG: 50 CAPSULE ORAL at 21:10

## 2025-03-13 RX ADMIN — IRON SUCROSE 200 MG: 20 INJECTION, SOLUTION INTRAVENOUS at 08:45

## 2025-03-13 RX ADMIN — TAMSULOSIN HYDROCHLORIDE 0.4 MG: 0.4 CAPSULE ORAL at 08:46

## 2025-03-13 RX ADMIN — BUSPIRONE HYDROCHLORIDE 10 MG: 10 TABLET ORAL at 08:45

## 2025-03-13 RX ADMIN — SENNOSIDES AND DOCUSATE SODIUM 1 TABLET: 50; 8.6 TABLET ORAL at 08:46

## 2025-03-13 RX ADMIN — OXYCODONE HYDROCHLORIDE 10 MG: 10 TABLET ORAL at 21:17

## 2025-03-13 RX ADMIN — DONEPEZIL HYDROCHLORIDE 10 MG: 5 TABLET ORAL at 08:46

## 2025-03-13 RX ADMIN — VANCOMYCIN HYDROCHLORIDE 1750 MG: 500 INJECTION, POWDER, LYOPHILIZED, FOR SOLUTION INTRAVENOUS at 05:24

## 2025-03-13 RX ADMIN — BUMETANIDE 2 MG: 0.25 INJECTION INTRAMUSCULAR; INTRAVENOUS at 08:46

## 2025-03-13 RX ADMIN — SENNOSIDES AND DOCUSATE SODIUM 1 TABLET: 50; 8.6 TABLET ORAL at 21:11

## 2025-03-13 RX ADMIN — HYDRALAZINE HYDROCHLORIDE 10 MG: 10 TABLET ORAL at 17:13

## 2025-03-13 RX ADMIN — SUCRALFATE 1 G: 1 TABLET ORAL at 08:45

## 2025-03-13 RX ADMIN — SUCRALFATE 1 G: 1 TABLET ORAL at 17:13

## 2025-03-13 RX ADMIN — MEROPENEM 1000 MG: 1 INJECTION INTRAVENOUS at 17:15

## 2025-03-13 RX ADMIN — ISOSORBIDE DINITRATE 10 MG: 10 TABLET ORAL at 21:11

## 2025-03-13 RX ADMIN — DAKIN'S SOLUTION 0.125% (QUARTER STRENGTH): 0.12 SOLUTION at 08:45

## 2025-03-13 RX ADMIN — CALCITRIOL CAPSULES 0.25 MCG 0.25 MCG: 0.25 CAPSULE ORAL at 08:45

## 2025-03-13 RX ADMIN — ONDANSETRON 4 MG: 2 INJECTION INTRAMUSCULAR; INTRAVENOUS at 21:17

## 2025-03-13 ASSESSMENT — PAIN DESCRIPTION - FREQUENCY: FREQUENCY: CONTINUOUS

## 2025-03-13 ASSESSMENT — PAIN DESCRIPTION - ONSET: ONSET: ON-GOING

## 2025-03-13 ASSESSMENT — PAIN DESCRIPTION - ORIENTATION: ORIENTATION: LEFT

## 2025-03-13 ASSESSMENT — PAIN DESCRIPTION - DESCRIPTORS
DESCRIPTORS: ACHING;DISCOMFORT
DESCRIPTORS: ACHING;DISCOMFORT

## 2025-03-13 ASSESSMENT — PAIN DESCRIPTION - LOCATION
LOCATION: NECK;BACK
LOCATION: FOOT

## 2025-03-13 ASSESSMENT — PAIN SCALES - GENERAL
PAINLEVEL_OUTOF10: 6
PAINLEVEL_OUTOF10: 4

## 2025-03-13 ASSESSMENT — PAIN DESCRIPTION - PAIN TYPE: TYPE: ACUTE PAIN

## 2025-03-13 ASSESSMENT — PAIN - FUNCTIONAL ASSESSMENT: PAIN_FUNCTIONAL_ASSESSMENT: PREVENTS OR INTERFERES SOME ACTIVE ACTIVITIES AND ADLS

## 2025-03-13 NOTE — PROGRESS NOTES
Pt uses CPAP at home at night-does not have machine does not know home settings-placed pt on CPAP auto titrating min 4 cm H20 max 20 cm H20 + 2 lpm

## 2025-03-13 NOTE — PROGRESS NOTES
Occupational Therapy     03/13/25 1600   Subjective   Subjective Pt sitting up in recliner upon arrival for therapy. Pt agreeable to participate.   Pain Assessment   Pain Assessment 0-10   Pain Level 4   Pain Location Foot   Pain Orientation Left   Pain Descriptors Aching;Discomfort   Functional Pain Assessment Prevents or interferes some active activities and ADLs   Pain Type Acute pain   Pain Frequency Continuous   Pain Onset On-going   Non-Pharmaceutical Pain Intervention(s) Ambulation/Increased Activity;Repositioned;Rest   Vitals   O2 Device Nasal cannula   Cognition   Overall Cognitive Status WFL   Orientation   Overall Orientation Status WFL   Bed Mobility Training   Bed Mobility Training No   Transfer Training   Transfer Training Yes   Overall Level of Assistance Minimal assistance   Interventions Verbal cues;Tactile cues;Manual cues   Sit to Stand Minimal assistance   Stand to Sit Minimal assistance   Bed to Chair Minimal assistance   Balance   Sitting Intact   Standing With support  (RW)   ADL   Feeding Setup;Independent   Grooming Setup;Independent   UE Bathing Setup;Stand by assistance;Minimal assistance   LE Bathing Moderate assistance;Maximum assistance   UE Dressing Setup;Stand by assistance;Minimal assistance   LE Dressing Maximum assistance   Putting On/Taking Off Footwear Maximum assistance   Toileting Moderate assistance   Functional Mobility Minimal assistance   Assessment   Assessment Tx focus on STS mobility and transfer bed<>recliner with Min assist. Pt educated on weight shifting and \"TTWB\" to avoid too much weight on LLE. Walking boot not being used to skin break down caused by boot. Assisted PCA with wound re-dressing while up in recliner. Pt left with all needs in reach. Spent time consulting with nursing over wound care/ weight bearing and possible Unna boot.   Activity Tolerance Patient tolerated treatment well   Discharge Recommendations Patient would benefit from continued therapy after  discharge;24 hour supervision or assist   Occupational Therapy Plan   Times Per Week 3-5x/wk   Times Per Day Once a day   OT Plan of Care   Thursday X     Electronically signed by DARCY Almeida on 3/13/2025 at 4:46 PM
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
## 2025-03-13 NOTE — PROGRESS NOTES
Nutrition Assessment     Type and Reason for Visit: Consult    Nutrition Recommendations/Plan:   Continue to follow for education     Malnutrition Assessment:  Malnutrition Status: At risk for malnutrition    Nutrition Assessment:  Continuing to follow for consult re: CHF and renal diet education.  Pt's diet has been avanced to Full liquid this am.  Intake still poor at 25-50%.  Did have a small onversation, but pt states still not feeling well.   BUN 76, Creat 3.6, GFR 18, Accuchek's . New weight not available. Will continue to follow for education    Estimated Daily Nutrient Needs:  Energy (kcal):  3876-0798 kcals (8-15 kcals/kg) Weight Used for Energy Requirements: Current     Protein (g):  162g Weight Used for Protein Requirements: Ideal        Fluid (ml/day):  9164-3459 ml Method Used for Fluid Requirements: 1 ml/kcal    Nutrition Related Findings:   +1 RUE edema., +2 pitting BLE edema Wound Type: Multiple, Diabetic Ulcer    Current Nutrition Therapies:    ADULT DIET; Full Liquid    Anthropometric Measures:  Height: 182.9 cm (6' 0.01\")  Current Body Wt: 136.5 kg (300 lb 14.9 oz)   BMI: 40.8        Nutrition Diagnosis:   Altered nutrition-related lab values, Inadequate protein-energy intake related to renal dysfunction, endocrine dysfunction, nausea/vomiting/diarrhea, decreased appetite, increase demand for energy/nutrients as evidenced by lab values, intake 0-25%, intake 26-50%, wounds    Nutrition Interventions:   Food and/or Nutrient Delivery: Continue Current Diet  Nutrition Education/Counseling: Education/Counseling needed  Coordination of Nutrition Care: Continue to monitor while inpatient  Plan of Care discussed with: pt    Goals:  Goals: Meet at least 75% of estimated needs, PO intake 50% or greater, Teach back diet education  Type of Goal: Continue current goal  Previous Goal Met: Progressing toward Goal(s)    Nutrition Monitoring and Evaluation:   Behavioral-Environmental Outcomes: Readiness for

## 2025-03-13 NOTE — PROGRESS NOTES
[facility]      Patient:  Cholo Bhakta  YOB: 1955  Date of Service: 3/13/2025  MRN: [id number]   Acct: [id number]   Primary Care Physician: Nirmal Nuno MD  Advance Directive: Full Code  Admit Date: 3/6/2025       Hospital Day: 7  Portions of this note have been copied forward, however, changed to reflect the most current clinical status of this patient.    CHIEF COMPLAINT shortness of breath    Cumulative Hospital course  69-year-old male with CAD, s/p CABG, systolic heart failure, type II DM, chronic diabetic foot wound follows outpatient podiatry, CKD 4, permanent AF on Eliquis, morbid obesity, NOBLE on BIPAP, with complaints of shortness of breath.  Patient was recently admitted on 2/21 due to acute on chronic systolic heart failure at that time he diuresed well and was able to discharge home in stable condition on 2/24.  Returns on 3/6 due to worsening shortness of breath.  Stated that he had had worsening abdominal swelling and lower extremity edema and noted a 20 pound weight gain. Workup in ER CXR moderate edema bilateral small effusions left greater than right, K5.2 creatinine 3.3 BUN 94, BNP 8365.  Admitted to hospitalist service due to acute on chronic systolic heart failure.  Initiated on IV diuretics and nephrology consulted due to decline in renal function.  Cardiology consulted to assist with fluid management underwent limited 2D echo for LV evaluation currently EF 45 to 50% with mild global hypokinesis.  Has noted improvement since January.  Recommending continuing diuretics and has signed off. In addition he reports that he follows with podiatry Dr. Heaton in [place] and underwent a wound debridement. Initially started on Doxycycline. Wound culture obtained Bacteroides fragilis, Morganella morganii, Proteus mirabilis, Corynebacterium, Enterococcus faecalis, Finegoldia magna, Staphylococcus aureus. Initiated on meropenem and vancomycin. Wound care following and dressing

## 2025-03-13 NOTE — PROGRESS NOTES
03/13/25 1400   Subjective   Subjective Patient in bed agrees to therapy.  Patient has no C/O pain .  Wife is present.   Cognition   Overall Cognitive Status WFL   Orientation   Overall Orientation Status WFL   Vitals   O2 Device Nasal cannula   Bed Mobility Training   Bed Mobility Training Yes   Supine to Sit Stand by assistance   Balance   Sitting Intact   Standing With support  (RW.  Adjusted RW for home use.)   Transfer Training   Transfer Training Yes   Sit to Stand Stand by assistance;Contact guard assistance   Stand to Sit Stand by assistance;Contact guard assistance   Bed to Chair Stand by assistance;Contact guard assistance   Gait Training   Gait Training Yes   Gait   Gait Training Yes   Overall Level of Assistance Stand by assistance;Contact guard assistance   Distance (ft) 5 Feet  (To chair)   Assistive Device Walker, rolling   PT Exercises   A/AROM Exercises Patient Marched in Place 1-2 minutes prior to sitting in chair.   Patient Education   Education Given To Patient;Family   Education Provided Role of Therapy;Plan of Care;Home Exercise Program;Transfer Training;Mobility Training;Energy Conservation;Equipment;Fall Prevention Strategies   Education Provided Comments Use of call light & staff assist.   Education Method Demonstration;Verbal   Barriers to Learning None   Education Outcome Verbalized understanding;Demonstrated understanding;Continued education needed   Other Specialty Interventions   Other Treatments/Modalities Patient in chair call light all needs in reach.  BSC in place for easy TR.  ( patient given supository )   Assessment   Activity Tolerance Patient tolerated treatment well   PT Plan of Care   Thursday X       Electronically signed by Maycol Garcia PTA on 3/13/2025 at 2:37 PM
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
## 2025-03-13 NOTE — PROGRESS NOTES
[facility]   Pharmacy Pharmacokinetic Monitoring Service - Vancomycin    Consulting Provider: CORI Fried   Indication: SSTI/Diabetic foot ulcer  Target Concentration: Dosing based on anticipated concentration <15 mg/L due to renal impairment/insufficiency  Day of Therapy: 3  Additional Antimicrobials: Merrem    Pertinent Laboratory Values:   Wt Readings from Last 1 Encounters:   03/08/25 (!) 136.5 kg (301 lb)     Temp Readings from Last 1 Encounters:   03/12/25 98 °F (36.7 °C)     Estimated Creatinine Clearance: 27 mL/min (A) (based on SCr of 3.7 mg/dL (H)).  Recent Labs     03/11/25  0307 03/12/25  0151 03/12/25  0843   CREATININE 3.6*  --  3.7*   BUN 78*  --  77*   WBC 6.4 6.4  --      Procalcitonin:     Pertinent Cultures:  Culture Date Source Results   PTA Wound Bacteroides fragilis, Morganella morganii, Proteus mirabilis, Corynebacterium, Enterococcus faecalis, Finegoldia magna, Staphylococcus aureus.   MRSA Nasal Swab: N/A. Non-respiratory infection.         Recent vancomycin administrations                     vancomycin (VANCOCIN) 2,000 mg in sodium chloride 0.9 % 500 mL IVPB (mg) 2,000 mg New Bag 03/11/25 2120    vancomycin (VANCOCIN) 2,500 mg in sodium chloride 0.9 % 500 mL IVPB (mg) 2,500 mg New Bag 03/10/25 1751                    Assessment:  Date/Time Current Dose Concentration Timing of Concentration (h) AUC   03/12/25 @ 1949 2000 mg (15mg/kg) 19.8 23 hrs post dose    Note: Serum concentrations collected for AUC dosing may appear elevated if collected in close proximity to the dose administered, this is not necessarily an indication of toxicity    Plan:  Current dosing regimen is therapeutic level however has increased to > 15 will hold dosing for 12 hours & resume pulse dosing in am.  Continue to pulse dose based on levels. Still refusing dialysis  Give 1750 mg (12.8 mg/kg) x 1 dose in am 03/13/25 0600  Repeat vancomycin concentration ordered for 03/14/25 @ with am labs (0600)

## 2025-03-14 VITALS
OXYGEN SATURATION: 100 % | HEIGHT: 73 IN | DIASTOLIC BLOOD PRESSURE: 50 MMHG | TEMPERATURE: 96.8 F | BODY MASS INDEX: 39.81 KG/M2 | SYSTOLIC BLOOD PRESSURE: 101 MMHG | RESPIRATION RATE: 16 BRPM | HEART RATE: 61 BPM | WEIGHT: 300.4 LBS

## 2025-03-14 LAB
ANION GAP SERPL CALCULATED.3IONS-SCNC: 12 MMOL/L (ref 8–16)
BASOPHILS # BLD: 0.1 K/UL (ref 0–0.2)
BASOPHILS NFR BLD: 1.2 % (ref 0–1)
BUN SERPL-MCNC: 69 MG/DL (ref 8–23)
CALCIUM SERPL-MCNC: 9.4 MG/DL (ref 8.8–10.2)
CHLORIDE SERPL-SCNC: 100 MMOL/L (ref 98–107)
CO2 SERPL-SCNC: 29 MMOL/L (ref 22–29)
CREAT SERPL-MCNC: 3.3 MG/DL (ref 0.7–1.2)
EKG P AXIS: NORMAL DEGREES
EKG P-R INTERVAL: NORMAL MS
EKG Q-T INTERVAL: 410 MS
EKG QRS DURATION: 124 MS
EKG QTC CALCULATION (BAZETT): 422 MS
EKG T AXIS: 80 DEGREES
EOSINOPHIL # BLD: 0.6 K/UL (ref 0–0.6)
EOSINOPHIL NFR BLD: 9.5 % (ref 0–5)
ERYTHROCYTE [DISTWIDTH] IN BLOOD BY AUTOMATED COUNT: 15 % (ref 11.5–14.5)
GLUCOSE BLD-MCNC: 133 MG/DL (ref 70–99)
GLUCOSE BLD-MCNC: 208 MG/DL (ref 70–99)
GLUCOSE SERPL-MCNC: 233 MG/DL (ref 70–99)
HCT VFR BLD AUTO: 31.4 % (ref 42–52)
HGB BLD-MCNC: 9.6 G/DL (ref 14–18)
IMM GRANULOCYTES # BLD: 0 K/UL
LYMPHOCYTES # BLD: 1.1 K/UL (ref 1.1–4.5)
LYMPHOCYTES NFR BLD: 16.6 % (ref 20–40)
MCH RBC QN AUTO: 28.7 PG (ref 27–31)
MCHC RBC AUTO-ENTMCNC: 30.6 G/DL (ref 33–37)
MCV RBC AUTO: 93.7 FL (ref 80–94)
MONOCYTES # BLD: 0.6 K/UL (ref 0–0.9)
MONOCYTES NFR BLD: 9.5 % (ref 0–10)
NEUTROPHILS # BLD: 4 K/UL (ref 1.5–7.5)
NEUTS SEG NFR BLD: 62.7 % (ref 50–65)
PERFORMED ON: ABNORMAL
PERFORMED ON: ABNORMAL
PLATELET # BLD AUTO: 234 K/UL (ref 130–400)
PMV BLD AUTO: 11.6 FL (ref 9.4–12.4)
POTASSIUM SERPL-SCNC: 4.2 MMOL/L (ref 3.5–5)
RBC # BLD AUTO: 3.35 M/UL (ref 4.7–6.1)
SODIUM SERPL-SCNC: 141 MMOL/L (ref 136–145)
VANCOMYCIN TROUGH SERPL-MCNC: 23.1 UG/ML (ref 10–20)
VANCOMYCIN TROUGH SERPL-MCNC: 23.9 UG/ML (ref 10–20)
WBC # BLD AUTO: 6.4 K/UL (ref 4.8–10.8)

## 2025-03-14 PROCEDURE — 97535 SELF CARE MNGMENT TRAINING: CPT

## 2025-03-14 PROCEDURE — 94660 CPAP INITIATION&MGMT: CPT

## 2025-03-14 PROCEDURE — 6370000000 HC RX 637 (ALT 250 FOR IP): Performed by: INTERNAL MEDICINE

## 2025-03-14 PROCEDURE — 36415 COLL VENOUS BLD VENIPUNCTURE: CPT

## 2025-03-14 PROCEDURE — 2580000003 HC RX 258

## 2025-03-14 PROCEDURE — 85025 COMPLETE CBC W/AUTO DIFF WBC: CPT

## 2025-03-14 PROCEDURE — 6370000000 HC RX 637 (ALT 250 FOR IP)

## 2025-03-14 PROCEDURE — 97530 THERAPEUTIC ACTIVITIES: CPT

## 2025-03-14 PROCEDURE — 93010 ELECTROCARDIOGRAM REPORT: CPT | Performed by: INTERNAL MEDICINE

## 2025-03-14 PROCEDURE — 82962 GLUCOSE BLOOD TEST: CPT

## 2025-03-14 PROCEDURE — 2700000000 HC OXYGEN THERAPY PER DAY

## 2025-03-14 PROCEDURE — 94760 N-INVAS EAR/PLS OXIMETRY 1: CPT

## 2025-03-14 PROCEDURE — 6360000002 HC RX W HCPCS

## 2025-03-14 PROCEDURE — 80202 ASSAY OF VANCOMYCIN: CPT

## 2025-03-14 PROCEDURE — 6360000002 HC RX W HCPCS: Performed by: NURSE PRACTITIONER

## 2025-03-14 PROCEDURE — 80048 BASIC METABOLIC PNL TOTAL CA: CPT

## 2025-03-14 PROCEDURE — 2500000003 HC RX 250 WO HCPCS

## 2025-03-14 PROCEDURE — 6370000000 HC RX 637 (ALT 250 FOR IP): Performed by: NURSE PRACTITIONER

## 2025-03-14 RX ORDER — ERGOCALCIFEROL 1.25 MG/1
50000 CAPSULE ORAL WEEKLY
Qty: 5 CAPSULE | Refills: 2 | Status: SHIPPED | OUTPATIENT
Start: 2025-03-15

## 2025-03-14 RX ORDER — INSULIN GLARGINE 100 [IU]/ML
20 INJECTION, SOLUTION SUBCUTANEOUS NIGHTLY
Qty: 6 ML | Refills: 1 | Status: SHIPPED | OUTPATIENT
Start: 2025-03-14

## 2025-03-14 RX ORDER — SENNA AND DOCUSATE SODIUM 50; 8.6 MG/1; MG/1
1 TABLET, FILM COATED ORAL 2 TIMES DAILY
Qty: 60 TABLET | Refills: 0 | Status: SHIPPED | OUTPATIENT
Start: 2025-03-14 | End: 2025-04-13

## 2025-03-14 RX ORDER — LEVOFLOXACIN 250 MG/1
250 TABLET, FILM COATED ORAL DAILY
Qty: 7 TABLET | Refills: 0 | Status: SHIPPED | OUTPATIENT
Start: 2025-03-14 | End: 2025-03-21

## 2025-03-14 RX ORDER — INSULIN LISPRO 100 [IU]/ML
0-100 INJECTION, SOLUTION INTRAVENOUS; SUBCUTANEOUS
Qty: 90 ML | Refills: 0 | Status: SHIPPED | OUTPATIENT
Start: 2025-03-14 | End: 2025-04-13

## 2025-03-14 RX ORDER — CLINDAMYCIN HYDROCHLORIDE 300 MG/1
300 CAPSULE ORAL 4 TIMES DAILY
Qty: 28 CAPSULE | Refills: 0 | Status: SHIPPED | OUTPATIENT
Start: 2025-03-14 | End: 2025-03-21

## 2025-03-14 RX ADMIN — SUCRALFATE 1 G: 1 TABLET ORAL at 13:22

## 2025-03-14 RX ADMIN — PANTOPRAZOLE SODIUM 40 MG: 40 TABLET, DELAYED RELEASE ORAL at 09:25

## 2025-03-14 RX ADMIN — APIXABAN 5 MG: 5 TABLET, FILM COATED ORAL at 09:25

## 2025-03-14 RX ADMIN — BUSPIRONE HYDROCHLORIDE 10 MG: 10 TABLET ORAL at 13:22

## 2025-03-14 RX ADMIN — ISOSORBIDE DINITRATE 10 MG: 10 TABLET ORAL at 09:25

## 2025-03-14 RX ADMIN — SUCRALFATE 1 G: 1 TABLET ORAL at 09:25

## 2025-03-14 RX ADMIN — PREGABALIN 100 MG: 50 CAPSULE ORAL at 09:25

## 2025-03-14 RX ADMIN — ISOSORBIDE DINITRATE 10 MG: 10 TABLET ORAL at 13:22

## 2025-03-14 RX ADMIN — HYDRALAZINE HYDROCHLORIDE 10 MG: 10 TABLET ORAL at 09:40

## 2025-03-14 RX ADMIN — POLYETHYLENE GLYCOL 3350 17 G: 17 POWDER, FOR SOLUTION ORAL at 09:30

## 2025-03-14 RX ADMIN — LACTULOSE 20 G: 20 SOLUTION ORAL at 09:26

## 2025-03-14 RX ADMIN — LACTULOSE 20 G: 20 SOLUTION ORAL at 13:22

## 2025-03-14 RX ADMIN — BUSPIRONE HYDROCHLORIDE 10 MG: 10 TABLET ORAL at 09:25

## 2025-03-14 RX ADMIN — MEROPENEM 1000 MG: 1 INJECTION INTRAVENOUS at 05:42

## 2025-03-14 RX ADMIN — OXYCODONE HYDROCHLORIDE 10 MG: 10 TABLET ORAL at 09:34

## 2025-03-14 RX ADMIN — TAMSULOSIN HYDROCHLORIDE 0.4 MG: 0.4 CAPSULE ORAL at 09:25

## 2025-03-14 RX ADMIN — ONDANSETRON 4 MG: 4 TABLET, ORALLY DISINTEGRATING ORAL at 11:26

## 2025-03-14 RX ADMIN — BUMETANIDE 2 MG: 0.25 INJECTION INTRAMUSCULAR; INTRAVENOUS at 09:31

## 2025-03-14 RX ADMIN — DONEPEZIL HYDROCHLORIDE 10 MG: 5 TABLET ORAL at 09:25

## 2025-03-14 RX ADMIN — DAKIN'S SOLUTION 0.125% (QUARTER STRENGTH): 0.12 SOLUTION at 11:22

## 2025-03-14 RX ADMIN — ALLOPURINOL 200 MG: 100 TABLET ORAL at 09:25

## 2025-03-14 RX ADMIN — SENNOSIDES AND DOCUSATE SODIUM 1 TABLET: 50; 8.6 TABLET ORAL at 09:25

## 2025-03-14 RX ADMIN — SODIUM CHLORIDE, PRESERVATIVE FREE 10 ML: 5 INJECTION INTRAVENOUS at 09:41

## 2025-03-14 RX ADMIN — Medication 12.5 MG: at 05:38

## 2025-03-14 RX ADMIN — METOPROLOL SUCCINATE 50 MG: 50 TABLET, FILM COATED, EXTENDED RELEASE ORAL at 09:25

## 2025-03-14 RX ADMIN — CALCITRIOL CAPSULES 0.25 MCG 0.25 MCG: 0.25 CAPSULE ORAL at 09:25

## 2025-03-14 ASSESSMENT — PAIN SCALES - GENERAL: PAINLEVEL_OUTOF10: 6

## 2025-03-14 ASSESSMENT — PAIN DESCRIPTION - LOCATION: LOCATION: NECK

## 2025-03-14 ASSESSMENT — PAIN DESCRIPTION - DESCRIPTORS: DESCRIPTORS: ACHING

## 2025-03-14 ASSESSMENT — PAIN DESCRIPTION - ORIENTATION: ORIENTATION: MID

## 2025-03-14 NOTE — PROGRESS NOTES
CLINICAL PHARMACY NOTE: MEDS TO BEDS    Total # of Prescriptions Filled: 6   The following medications were delivered to the patient:  Current Discharge Medication List        START taking these medications    Details   sennosides-docusate sodium (SENOKOT-S) 8.6-50 MG tablet Take 1 tablet by mouth 2 times daily  Qty: 60 tablet, Refills: 0      Ergocalciferol (VITAMIN D) 66466 units CAPS Take 50,000 Units by mouth once a week  Qty: 5 capsule, Refills: 2      clindamycin (CLEOCIN) 300 MG capsule Take 1 capsule by mouth 4 times daily for 7 days  Qty: 28 capsule, Refills: 0      levoFLOXacin (LEVAQUIN) 250 MG tablet Take 1 tablet by mouth daily for 7 days  Qty: 7 tablet, Refills: 0           Pen needles  lantus    Additional Documentation:    Delivered RX to patient bedside. Paid with card.

## 2025-03-14 NOTE — PLAN OF CARE
Problem: Chronic Conditions and Co-morbidities  Goal: Patient's chronic conditions and co-morbidity symptoms are monitored and maintained or improved  Outcome: Progressing     Problem: ABCDS Injury Assessment  Goal: Absence of physical injury  Outcome: Progressing  Flowsheets (Taken 3/13/2025 2259)  Absence of Physical Injury: Implement safety measures based on patient assessment     Problem: Safety - Adult  Goal: Free from fall injury  Outcome: Progressing  Flowsheets (Taken 3/13/2025 2259)  Free From Fall Injury: Instruct family/caregiver on patient safety     Problem: Skin/Tissue Integrity  Goal: Skin integrity remains intact  Description: 1.  Monitor for areas of redness and/or skin breakdown  2.  Assess vascular access sites hourly  3.  Every 4-6 hours minimum:  Change oxygen saturation probe site  4.  Every 4-6 hours:  If on nasal continuous positive airway pressure, respiratory therapy assess nares and determine need for appliance change or resting period  Outcome: Progressing  Flowsheets (Taken 3/13/2025 2259)  Skin Integrity Remains Intact: Monitor for areas of redness and/or skin breakdown     Problem: Pain  Goal: Verbalizes/displays adequate comfort level or baseline comfort level  Outcome: Progressing     Problem: Nutrition Deficit:  Goal: Optimize nutritional status  Outcome: Progressing
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
## 2025-03-14 NOTE — PROGRESS NOTES
Physical Therapy     03/14/25 1000   Restrictions/Precautions   Restrictions/Precautions Weight Bearing   Required Braces or Orthoses? Yes   Lower Extremity Weight Bearing Restrictions   Left Lower Extremity Weight Bearing Weight Bearing As Tolerated   Subjective   Subjective pt agreeable to work with therapy. pt found ambulating back from bathroom IND with no RW and nothing covering L foot.   Assessment   Assessment pt able to ambulate short distance with no LOB but educated on importance of using RW during AMB to offload pressure on L foot and use these alternative to boot that pt states he is unable to wear due to pain and swelling and is not an appropriate boot given his current sores on bottom of L foot. pt agreed to use foot coverings and protective shoe and heel cushion in bed/sitting up to protect L foot. left sitting EOB with heel cushion on L foot.   Patient Education   Education Given To Patient;Family   Education Provided Equipment;Transfer Training   Education Provided Comments pt given shoe coverings for L foot for infection prevention along with heel protection boot for in bed/sitting up use to offload weight/prevent worsening of L foot wound and new shoe brace that has increased cushioning for wounds. attempted to find true offload heel boot with  stating the hospital did not currenty have any.   Education Method Demonstration;Verbal   Barriers to Learning None   Education Outcome Verbalized understanding;Demonstrated understanding   PT Plan of Care   Friday X   Safety Devices   Type of Devices Call light within reach;Nurse notified     Electronically signed by Per Dmaico PTA on 3/14/2025 at 10:59 AM

## 2025-03-14 NOTE — PROGRESS NOTES
03/14/25 1059   Resting (Room Air)   SpO2 96   Resting (On O2)   Comments PATIENT CANNOT WALK   After Walk   Does the Patient Qualify for Home O2 No   Does the Patient Need Portable Oxygen Tanks No

## 2025-03-14 NOTE — PROGRESS NOTES
[facility]   Pharmacy Pharmacokinetic Monitoring Service - Vancomycin    Consulting Provider: CORI Fried   Indication: SSTI  Target Concentration: Dosing based on anticipated concentration 15-20 mg/L due to renal impairment/insufficiency  Day of Therapy: 5  Additional Antimicrobials: Merrem    Pertinent Laboratory Values:   Wt Readings from Last 1 Encounters:   03/14/25 (!) 136.3 kg (300 lb 6.4 oz)     Temp Readings from Last 1 Encounters:   03/14/25 96.8 °F (36 °C) (Temporal)     Estimated Creatinine Clearance: 31 mL/min (A) (based on SCr of 3.3 mg/dL (H)).  Recent Labs     03/13/25  0136 03/14/25  0407   CREATININE 3.6* 3.3*   BUN 76* 69*   WBC 6.4 6.4     Procalcitonin:     Pertinent Cultures:  Culture Date Source Results   PTA Wound Bacteroides fragilis, Morganella morganii, Proteus mirabilis, Corynebacterium, Enterococcus faecalis, Finegoldia magna, Staphylococcus aureus   MRSA Nasal Swab: N/A. Non-respiratory infection.      Recent vancomycin administrations                     vancomycin (VANCOCIN) 1,750 mg in sodium chloride 0.9 % 500 mL IVPB (mg) 1,750 mg New Bag 03/13/25 0524    vancomycin (VANCOCIN) 2,000 mg in sodium chloride 0.9 % 500 mL IVPB (mg) 2,000 mg New Bag 03/11/25 2120                    Assessment:  Date/Time Current Dose Concentration Timing of Concentration (h) AUC   03/14/25 1148 1750 mg (12.8mg/kg) 23.1 30 hrs post dose    Note: Serum concentrations collected for AUC dosing may appear elevated if collected in close proximity to the dose administered, this is not necessarily an indication of toxicity    Plan:  Current dosing regimen is supra-therapeutic  Wife wants to hold still on dialysis   HOLD Vancomycin   Repeat vancomycin concentration ordered for 03/15/25 @ 0500   Pharmacy will continue to monitor patient and adjust therapy as indicated    Thank you for the consult,  Carlos Man RPH  3/14/2025 12:58 PM

## 2025-03-14 NOTE — PROGRESS NOTES
Occupational Therapy     03/14/25 1000   Subjective   Subjective Pt in the bathroom with nursing present. Pt took himself to the bathroom with no use of AD.   Cognition   Overall Cognitive Status WFL   Orientation   Overall Orientation Status WFL   Bed Mobility Training   Bed Mobility Training No   Transfer Training   Transfer Training Yes   Overall Level of Assistance Contact guard assistance;Stand by assistance   Interventions Verbal cues;Tactile cues   Sit to Stand Contact guard assistance;Stand by assistance   Stand to Sit Contact guard assistance;Stand by assistance   Toilet Transfer Contact guard assistance;Stand by assistance   Balance   Sitting Intact   Standing With support   Standing - Static Fair   Standing - Dynamic Fair   ADL   Feeding Setup;Independent   Grooming Setup;Independent   UE Bathing Setup;Stand by assistance;Minimal assistance   LE Bathing Moderate assistance;Maximum assistance   UE Dressing Setup;Stand by assistance;Minimal assistance   LE Dressing Maximum assistance   Putting On/Taking Off Footwear Maximum assistance   Toileting Contact guard assistance;Stand by assistance   Functional Mobility Contact guard assistance;Stand by assistance   Assessment   Assessment Tx focused on completing toileting task and transfer back to sitting EOB. Pt had taken himself to the bathroom with only wound dressing on his LLE. Pt educated on protecting during mobility to prevent further skin break down and prevent infection. Pt given PPE gear of shoe covers to fit over top of dressing to protect foot from contact with floor (due to socks not fitting properly). Pt given heel protector bootie for wear in bed and when lounging in recliner or sitting up EOB. Pt advised not to use heel bootie when transferring for safety. Pt also given foam support shoe to wear when up standing/ transfers for added protection. Pt educated on the benefits of using the rolling walker to be able to offload foot with support. Pt  stayed sitting up at EOB with heel bootie donned. Nursing made aware of items given to patient.   Activity Tolerance Patient tolerated treatment well   Discharge Recommendations Patient would benefit from continued therapy after discharge   Occupational Therapy Plan   Times Per Week 3-5x/wk   Times Per Day Once a day   OT Plan of Care   Friday X     Electronically signed by DARCY Almeida on 3/14/2025 at 10:57 AM
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
## 2025-03-14 NOTE — PROGRESS NOTES
[facility]   Pharmacy Pharmacokinetic Monitoring Service - Vancomycin    Consulting Provider: Ramu Esposito   Indication: Skin/Soft Tissue Infection  Target Concentration:  Tr: 15-20  Day of Therapy: 5  Additional Antimicrobials: Merrem    Pertinent Laboratory Values:   Wt Readings from Last 1 Encounters:   03/14/25 136.1 kg (300 lb)     Temp Readings from Last 1 Encounters:   03/13/25 97 °F (36.1 °C)     Estimated Creatinine Clearance: 31 mL/min (A) (based on SCr of 3.3 mg/dL (H)).  Recent Labs     03/13/25  0136 03/14/25  0407   CREATININE 3.6* 3.3*   BUN 76* 69*   WBC 6.4 6.4     Procalcitonin: N/A    Pertinent Cultures:  No Current cultures at this time  Culture Date Source Results        MRSA Nasal Swab: N/A. Non-respiratory infection.    Recent vancomycin administrations                     vancomycin (VANCOCIN) 1,750 mg in sodium chloride 0.9 % 500 mL IVPB (mg) 1,750 mg New Bag 03/13/25 0524    vancomycin (VANCOCIN) 2,000 mg in sodium chloride 0.9 % 500 mL IVPB (mg) 2,000 mg New Bag 03/11/25 2120                    Assessment:  Date/Time Current Dose Concentration Timing of Concentration (h) AUC   03/14/25 Pulse dosing 23.9 Random level     Note: Serum concentrations collected for AUC dosing may appear elevated if collected in close proximity to the dose administered, this is not necessarily an indication of toxicity    Plan:  Current dosing regimen is supra-therapeutic  No Vancomycin needed at this time  Repeat vancomycin concentration ordered for 03/14 @ 1200   Pharmacy will continue to monitor patient and adjust therapy as indicated    Thank you for the consult,  Lena Cervantes RPH  3/14/2025 5:54 AM

## 2025-04-01 ENCOUNTER — HOSPITAL ENCOUNTER (INPATIENT)
Age: 70
LOS: 3 days | Discharge: HOME | End: 2025-04-05
Attending: EMERGENCY MEDICINE | Admitting: INTERNAL MEDICINE
Payer: MEDICARE

## 2025-04-01 DIAGNOSIS — N18.9 CHRONIC KIDNEY DISEASE, UNSPECIFIED CKD STAGE: ICD-10-CM

## 2025-04-01 DIAGNOSIS — I50.21 ACUTE SYSTOLIC CONGESTIVE HEART FAILURE (HCC): Primary | ICD-10-CM

## 2025-04-01 DIAGNOSIS — I50.23 ACUTE ON CHRONIC SYSTOLIC CHF (CONGESTIVE HEART FAILURE) (HCC): ICD-10-CM

## 2025-04-01 PROCEDURE — 99285 EMERGENCY DEPT VISIT HI MDM: CPT

## 2025-04-01 ASSESSMENT — PAIN DESCRIPTION - ORIENTATION: ORIENTATION: MID

## 2025-04-01 ASSESSMENT — PAIN - FUNCTIONAL ASSESSMENT: PAIN_FUNCTIONAL_ASSESSMENT: 0-10

## 2025-04-01 ASSESSMENT — PAIN DESCRIPTION - LOCATION: LOCATION: CHEST

## 2025-04-01 ASSESSMENT — PAIN DESCRIPTION - DESCRIPTORS: DESCRIPTORS: DISCOMFORT

## 2025-04-01 ASSESSMENT — PAIN SCALES - GENERAL: PAINLEVEL_OUTOF10: 6

## 2025-04-02 ENCOUNTER — APPOINTMENT (OUTPATIENT)
Dept: GENERAL RADIOLOGY | Age: 70
End: 2025-04-02
Payer: MEDICARE

## 2025-04-02 PROBLEM — I50.23 ACUTE ON CHRONIC SYSTOLIC CHF (CONGESTIVE HEART FAILURE) (HCC): Status: ACTIVE | Noted: 2025-04-02

## 2025-04-02 LAB
ALBUMIN SERPL-MCNC: 3.7 G/DL (ref 3.5–5.2)
ALP SERPL-CCNC: 161 U/L (ref 40–129)
ALT SERPL-CCNC: 50 U/L (ref 10–50)
ANION GAP SERPL CALCULATED.3IONS-SCNC: 15 MMOL/L (ref 8–16)
AST SERPL-CCNC: 57 U/L (ref 10–50)
BASOPHILS # BLD: 0.1 K/UL (ref 0–0.2)
BASOPHILS NFR BLD: 0.7 % (ref 0–1)
BILIRUB SERPL-MCNC: 0.3 MG/DL (ref 0.2–1.2)
BNP BLD-MCNC: 8576 PG/ML (ref 0–124)
BUN SERPL-MCNC: 71 MG/DL (ref 8–23)
CALCIUM SERPL-MCNC: 9 MG/DL (ref 8.8–10.2)
CHLORIDE SERPL-SCNC: 100 MMOL/L (ref 98–107)
CO2 SERPL-SCNC: 24 MMOL/L (ref 22–29)
CREAT SERPL-MCNC: 3.4 MG/DL (ref 0.7–1.2)
EOSINOPHIL # BLD: 0.5 K/UL (ref 0–0.6)
EOSINOPHIL NFR BLD: 5.4 % (ref 0–5)
ERYTHROCYTE [DISTWIDTH] IN BLOOD BY AUTOMATED COUNT: 15 % (ref 11.5–14.5)
FERRITIN SERPL-MCNC: 625 NG/ML (ref 30–400)
GLUCOSE BLD-MCNC: 188 MG/DL (ref 70–99)
GLUCOSE BLD-MCNC: 208 MG/DL (ref 70–99)
GLUCOSE BLD-MCNC: 264 MG/DL (ref 70–99)
GLUCOSE SERPL-MCNC: 308 MG/DL (ref 70–99)
HCT VFR BLD AUTO: 28 % (ref 42–52)
HGB BLD-MCNC: 9.1 G/DL (ref 14–18)
IMM GRANULOCYTES # BLD: 0 K/UL
IRON SATN MFR SERPL: 19 % (ref 20–50)
IRON SERPL-MCNC: 43 UG/DL (ref 59–158)
LYMPHOCYTES # BLD: 1.1 K/UL (ref 1.1–4.5)
LYMPHOCYTES NFR BLD: 12.8 % (ref 20–40)
MCH RBC QN AUTO: 29.4 PG (ref 27–31)
MCHC RBC AUTO-ENTMCNC: 32.5 G/DL (ref 33–37)
MCV RBC AUTO: 90.6 FL (ref 80–94)
MONOCYTES # BLD: 0.9 K/UL (ref 0–0.9)
MONOCYTES NFR BLD: 10.9 % (ref 0–10)
NEUTROPHILS # BLD: 5.8 K/UL (ref 1.5–7.5)
NEUTS SEG NFR BLD: 69.8 % (ref 50–65)
PERFORMED ON: ABNORMAL
PLATELET # BLD AUTO: 226 K/UL (ref 130–400)
PMV BLD AUTO: 12.2 FL (ref 9.4–12.4)
POTASSIUM SERPL-SCNC: 4.5 MMOL/L (ref 3.5–5.1)
PROT SERPL-MCNC: 6.5 G/DL (ref 6.4–8.3)
RBC # BLD AUTO: 3.09 M/UL (ref 4.7–6.1)
SODIUM SERPL-SCNC: 139 MMOL/L (ref 136–145)
T4 FREE SERPL-MCNC: 1.19 NG/DL (ref 0.93–1.7)
TIBC SERPL-MCNC: 228 UG/DL (ref 250–400)
TROPONIN, HIGH SENSITIVITY: 122 NG/L (ref 0–22)
TROPONIN, HIGH SENSITIVITY: 126 NG/L (ref 0–22)
TSH SERPL DL<=0.005 MIU/L-ACNC: 1.95 UIU/ML (ref 0.27–4.2)
WBC # BLD AUTO: 8.3 K/UL (ref 4.8–10.8)

## 2025-04-02 PROCEDURE — 6360000002 HC RX W HCPCS: Performed by: EMERGENCY MEDICINE

## 2025-04-02 PROCEDURE — 1200000000 HC SEMI PRIVATE

## 2025-04-02 PROCEDURE — 84484 ASSAY OF TROPONIN QUANT: CPT

## 2025-04-02 PROCEDURE — 96374 THER/PROPH/DIAG INJ IV PUSH: CPT

## 2025-04-02 PROCEDURE — 82728 ASSAY OF FERRITIN: CPT

## 2025-04-02 PROCEDURE — 99222 1ST HOSP IP/OBS MODERATE 55: CPT | Performed by: INTERNAL MEDICINE

## 2025-04-02 PROCEDURE — 84443 ASSAY THYROID STIM HORMONE: CPT

## 2025-04-02 PROCEDURE — 6360000002 HC RX W HCPCS: Performed by: INTERNAL MEDICINE

## 2025-04-02 PROCEDURE — 2580000003 HC RX 258: Performed by: INTERNAL MEDICINE

## 2025-04-02 PROCEDURE — 71045 X-RAY EXAM CHEST 1 VIEW: CPT

## 2025-04-02 PROCEDURE — 6360000002 HC RX W HCPCS

## 2025-04-02 PROCEDURE — 83540 ASSAY OF IRON: CPT

## 2025-04-02 PROCEDURE — 36415 COLL VENOUS BLD VENIPUNCTURE: CPT

## 2025-04-02 PROCEDURE — 84439 ASSAY OF FREE THYROXINE: CPT

## 2025-04-02 PROCEDURE — 85025 COMPLETE CBC W/AUTO DIFF WBC: CPT

## 2025-04-02 PROCEDURE — 6370000000 HC RX 637 (ALT 250 FOR IP): Performed by: NURSE PRACTITIONER

## 2025-04-02 PROCEDURE — 83550 IRON BINDING TEST: CPT

## 2025-04-02 PROCEDURE — 82962 GLUCOSE BLOOD TEST: CPT

## 2025-04-02 PROCEDURE — 83880 ASSAY OF NATRIURETIC PEPTIDE: CPT

## 2025-04-02 PROCEDURE — 6370000000 HC RX 637 (ALT 250 FOR IP): Performed by: INTERNAL MEDICINE

## 2025-04-02 PROCEDURE — 6360000002 HC RX W HCPCS: Performed by: NURSE PRACTITIONER

## 2025-04-02 PROCEDURE — 96375 TX/PRO/DX INJ NEW DRUG ADDON: CPT

## 2025-04-02 PROCEDURE — 2500000003 HC RX 250 WO HCPCS: Performed by: NURSE PRACTITIONER

## 2025-04-02 PROCEDURE — 93005 ELECTROCARDIOGRAM TRACING: CPT | Performed by: EMERGENCY MEDICINE

## 2025-04-02 PROCEDURE — 80053 COMPREHEN METABOLIC PANEL: CPT

## 2025-04-02 RX ORDER — BUMETANIDE 0.25 MG/ML
1 INJECTION, SOLUTION INTRAMUSCULAR; INTRAVENOUS ONCE
Status: COMPLETED | OUTPATIENT
Start: 2025-04-02 | End: 2025-04-02

## 2025-04-02 RX ORDER — ONDANSETRON 4 MG/1
4 TABLET, ORALLY DISINTEGRATING ORAL EVERY 8 HOURS PRN
Status: DISCONTINUED | OUTPATIENT
Start: 2025-04-02 | End: 2025-04-05 | Stop reason: HOSPADM

## 2025-04-02 RX ORDER — GLUCAGON 1 MG/ML
1 KIT INJECTION PRN
Status: DISCONTINUED | OUTPATIENT
Start: 2025-04-02 | End: 2025-04-05 | Stop reason: HOSPADM

## 2025-04-02 RX ORDER — BUMETANIDE 0.25 MG/ML
2 INJECTION, SOLUTION INTRAMUSCULAR; INTRAVENOUS 2 TIMES DAILY
Status: DISCONTINUED | OUTPATIENT
Start: 2025-04-02 | End: 2025-04-02

## 2025-04-02 RX ORDER — OXYCODONE HYDROCHLORIDE 10 MG/1
10 TABLET ORAL 2 TIMES DAILY PRN
Status: DISCONTINUED | OUTPATIENT
Start: 2025-04-02 | End: 2025-04-05 | Stop reason: HOSPADM

## 2025-04-02 RX ORDER — ONDANSETRON 2 MG/ML
INJECTION INTRAMUSCULAR; INTRAVENOUS
Status: COMPLETED
Start: 2025-04-02 | End: 2025-04-02

## 2025-04-02 RX ORDER — POTASSIUM CHLORIDE 7.45 MG/ML
10 INJECTION INTRAVENOUS PRN
Status: DISCONTINUED | OUTPATIENT
Start: 2025-04-02 | End: 2025-04-05 | Stop reason: HOSPADM

## 2025-04-02 RX ORDER — TAMSULOSIN HYDROCHLORIDE 0.4 MG/1
0.4 CAPSULE ORAL DAILY
Status: DISCONTINUED | OUTPATIENT
Start: 2025-04-03 | End: 2025-04-05 | Stop reason: HOSPADM

## 2025-04-02 RX ORDER — DEXTROSE MONOHYDRATE 100 MG/ML
INJECTION, SOLUTION INTRAVENOUS CONTINUOUS PRN
Status: DISCONTINUED | OUTPATIENT
Start: 2025-04-02 | End: 2025-04-05 | Stop reason: HOSPADM

## 2025-04-02 RX ORDER — MORPHINE SULFATE 2 MG/ML
1 INJECTION, SOLUTION INTRAMUSCULAR; INTRAVENOUS EVERY 4 HOURS PRN
Status: DISPENSED | OUTPATIENT
Start: 2025-04-02 | End: 2025-04-04

## 2025-04-02 RX ORDER — ISOSORBIDE DINITRATE 10 MG/1
10 TABLET ORAL 3 TIMES DAILY
Status: DISCONTINUED | OUTPATIENT
Start: 2025-04-02 | End: 2025-04-05 | Stop reason: HOSPADM

## 2025-04-02 RX ORDER — SODIUM CHLORIDE 0.9 % (FLUSH) 0.9 %
5-40 SYRINGE (ML) INJECTION EVERY 12 HOURS SCHEDULED
Status: DISCONTINUED | OUTPATIENT
Start: 2025-04-02 | End: 2025-04-05 | Stop reason: HOSPADM

## 2025-04-02 RX ORDER — ACETAMINOPHEN 325 MG/1
650 TABLET ORAL EVERY 6 HOURS PRN
Status: DISCONTINUED | OUTPATIENT
Start: 2025-04-02 | End: 2025-04-05 | Stop reason: HOSPADM

## 2025-04-02 RX ORDER — INSULIN GLARGINE 100 [IU]/ML
10 INJECTION, SOLUTION SUBCUTANEOUS NIGHTLY
Status: DISCONTINUED | OUTPATIENT
Start: 2025-04-02 | End: 2025-04-05 | Stop reason: HOSPADM

## 2025-04-02 RX ORDER — POLYETHYLENE GLYCOL 3350 17 G/17G
17 POWDER, FOR SOLUTION ORAL DAILY PRN
Status: DISCONTINUED | OUTPATIENT
Start: 2025-04-02 | End: 2025-04-05 | Stop reason: HOSPADM

## 2025-04-02 RX ORDER — SODIUM CHLORIDE 9 MG/ML
INJECTION, SOLUTION INTRAVENOUS PRN
Status: DISCONTINUED | OUTPATIENT
Start: 2025-04-02 | End: 2025-04-05 | Stop reason: HOSPADM

## 2025-04-02 RX ORDER — SODIUM CHLORIDE 0.9 % (FLUSH) 0.9 %
5-40 SYRINGE (ML) INJECTION PRN
Status: DISCONTINUED | OUTPATIENT
Start: 2025-04-02 | End: 2025-04-05 | Stop reason: HOSPADM

## 2025-04-02 RX ORDER — INSULIN LISPRO 100 [IU]/ML
0-4 INJECTION, SOLUTION INTRAVENOUS; SUBCUTANEOUS
Status: DISCONTINUED | OUTPATIENT
Start: 2025-04-02 | End: 2025-04-05 | Stop reason: HOSPADM

## 2025-04-02 RX ORDER — POTASSIUM CHLORIDE 1500 MG/1
40 TABLET, EXTENDED RELEASE ORAL PRN
Status: DISCONTINUED | OUTPATIENT
Start: 2025-04-02 | End: 2025-04-05 | Stop reason: HOSPADM

## 2025-04-02 RX ORDER — CALCITRIOL 0.25 UG/1
0.25 CAPSULE, LIQUID FILLED ORAL DAILY
Status: DISCONTINUED | OUTPATIENT
Start: 2025-04-03 | End: 2025-04-05 | Stop reason: HOSPADM

## 2025-04-02 RX ORDER — METOLAZONE 5 MG/1
5 TABLET ORAL DAILY
Status: DISCONTINUED | OUTPATIENT
Start: 2025-04-02 | End: 2025-04-05

## 2025-04-02 RX ORDER — ONDANSETRON 2 MG/ML
4 INJECTION INTRAMUSCULAR; INTRAVENOUS EVERY 6 HOURS PRN
Status: DISCONTINUED | OUTPATIENT
Start: 2025-04-02 | End: 2025-04-05 | Stop reason: HOSPADM

## 2025-04-02 RX ORDER — METOPROLOL SUCCINATE 50 MG/1
50 TABLET, EXTENDED RELEASE ORAL DAILY
Status: DISCONTINUED | OUTPATIENT
Start: 2025-04-03 | End: 2025-04-05 | Stop reason: HOSPADM

## 2025-04-02 RX ORDER — DONEPEZIL HYDROCHLORIDE 10 MG/1
10 TABLET, FILM COATED ORAL DAILY
Status: DISCONTINUED | OUTPATIENT
Start: 2025-04-03 | End: 2025-04-05 | Stop reason: HOSPADM

## 2025-04-02 RX ORDER — FERROUS SULFATE 325(65) MG
325 TABLET ORAL 2 TIMES DAILY
Status: DISCONTINUED | OUTPATIENT
Start: 2025-04-02 | End: 2025-04-05 | Stop reason: HOSPADM

## 2025-04-02 RX ORDER — ACETAMINOPHEN 650 MG/1
650 SUPPOSITORY RECTAL EVERY 6 HOURS PRN
Status: DISCONTINUED | OUTPATIENT
Start: 2025-04-02 | End: 2025-04-05 | Stop reason: HOSPADM

## 2025-04-02 RX ORDER — ROSUVASTATIN CALCIUM 10 MG/1
10 TABLET, COATED ORAL NIGHTLY
Status: DISCONTINUED | OUTPATIENT
Start: 2025-04-03 | End: 2025-04-05 | Stop reason: HOSPADM

## 2025-04-02 RX ORDER — MAGNESIUM SULFATE IN WATER 40 MG/ML
2000 INJECTION, SOLUTION INTRAVENOUS PRN
Status: DISCONTINUED | OUTPATIENT
Start: 2025-04-02 | End: 2025-04-05 | Stop reason: HOSPADM

## 2025-04-02 RX ORDER — MORPHINE SULFATE 4 MG/ML
4 INJECTION, SOLUTION INTRAMUSCULAR; INTRAVENOUS ONCE
Refills: 0 | Status: COMPLETED | OUTPATIENT
Start: 2025-04-02 | End: 2025-04-02

## 2025-04-02 RX ORDER — PANTOPRAZOLE SODIUM 40 MG/1
40 TABLET, DELAYED RELEASE ORAL DAILY
Status: DISCONTINUED | OUTPATIENT
Start: 2025-04-03 | End: 2025-04-05 | Stop reason: HOSPADM

## 2025-04-02 RX ADMIN — ONDANSETRON 4 MG: 2 INJECTION INTRAMUSCULAR; INTRAVENOUS at 08:35

## 2025-04-02 RX ADMIN — SODIUM CHLORIDE, PRESERVATIVE FREE 10 ML: 5 INJECTION INTRAVENOUS at 09:45

## 2025-04-02 RX ADMIN — BUMETANIDE 2 MG: 0.25 INJECTION INTRAMUSCULAR; INTRAVENOUS at 09:44

## 2025-04-02 RX ADMIN — BUMETANIDE 0.2 MG/HR: 0.25 INJECTION INTRAMUSCULAR; INTRAVENOUS at 12:55

## 2025-04-02 RX ADMIN — INSULIN LISPRO 1 UNITS: 100 INJECTION, SOLUTION INTRAVENOUS; SUBCUTANEOUS at 23:27

## 2025-04-02 RX ADMIN — APIXABAN 5 MG: 5 TABLET, FILM COATED ORAL at 09:45

## 2025-04-02 RX ADMIN — FERROUS SULFATE TAB 325 MG (65 MG ELEMENTAL FE) 325 MG: 325 (65 FE) TAB at 22:41

## 2025-04-02 RX ADMIN — BUMETANIDE 1 MG: 0.25 INJECTION INTRAMUSCULAR; INTRAVENOUS at 07:12

## 2025-04-02 RX ADMIN — APIXABAN 5 MG: 5 TABLET, FILM COATED ORAL at 22:41

## 2025-04-02 RX ADMIN — FERROUS SULFATE TAB 325 MG (65 MG ELEMENTAL FE) 325 MG: 325 (65 FE) TAB at 09:45

## 2025-04-02 RX ADMIN — MORPHINE SULFATE 1 MG: 2 INJECTION, SOLUTION INTRAMUSCULAR; INTRAVENOUS at 14:09

## 2025-04-02 RX ADMIN — INSULIN GLARGINE 10 UNITS: 100 INJECTION, SOLUTION SUBCUTANEOUS at 23:28

## 2025-04-02 RX ADMIN — MORPHINE SULFATE 1 MG: 2 INJECTION, SOLUTION INTRAMUSCULAR; INTRAVENOUS at 22:41

## 2025-04-02 RX ADMIN — METOLAZONE 5 MG: 5 TABLET ORAL at 12:45

## 2025-04-02 RX ADMIN — MORPHINE SULFATE 4 MG: 4 INJECTION, SOLUTION INTRAMUSCULAR; INTRAVENOUS at 06:08

## 2025-04-02 RX ADMIN — IRON SUCROSE 200 MG: 20 INJECTION, SOLUTION INTRAVENOUS at 12:45

## 2025-04-02 RX ADMIN — INSULIN LISPRO 2 UNITS: 100 INJECTION, SOLUTION INTRAVENOUS; SUBCUTANEOUS at 12:47

## 2025-04-02 RX ADMIN — INSULIN LISPRO 1 UNITS: 100 INJECTION, SOLUTION INTRAVENOUS; SUBCUTANEOUS at 16:33

## 2025-04-02 SDOH — ECONOMIC STABILITY: FOOD INSECURITY: WITHIN THE PAST 12 MONTHS, YOU WORRIED THAT YOUR FOOD WOULD RUN OUT BEFORE YOU GOT MONEY TO BUY MORE.: NEVER TRUE

## 2025-04-02 SDOH — ECONOMIC STABILITY: INCOME INSECURITY: HOW HARD IS IT FOR YOU TO PAY FOR THE VERY BASICS LIKE FOOD, HOUSING, MEDICAL CARE, AND HEATING?: NOT HARD AT ALL

## 2025-04-02 SDOH — ECONOMIC STABILITY: INCOME INSECURITY: IN THE PAST 12 MONTHS, HAS THE ELECTRIC, GAS, OIL, OR WATER COMPANY THREATENED TO SHUT OFF SERVICE IN YOUR HOME?: NO

## 2025-04-02 ASSESSMENT — PATIENT HEALTH QUESTIONNAIRE - PHQ9
SUM OF ALL RESPONSES TO PHQ QUESTIONS 1-9: 0
2. FEELING DOWN, DEPRESSED OR HOPELESS: NOT AT ALL
SUM OF ALL RESPONSES TO PHQ QUESTIONS 1-9: 0
1. LITTLE INTEREST OR PLEASURE IN DOING THINGS: NOT AT ALL

## 2025-04-02 ASSESSMENT — ENCOUNTER SYMPTOMS
ABDOMINAL DISTENTION: 1
ABDOMINAL PAIN: 1
BLOOD IN STOOL: 0
ABDOMINAL PAIN: 0
COLOR CHANGE: 0
COUGH: 0
SHORTNESS OF BREATH: 1
ABDOMINAL DISTENTION: 0
NAUSEA: 0
WHEEZING: 1
VOMITING: 0
WHEEZING: 0
DIARRHEA: 0
CONSTIPATION: 0
BACK PAIN: 0

## 2025-04-02 ASSESSMENT — PAIN SCALES - GENERAL
PAINLEVEL_OUTOF10: 0
PAINLEVEL_OUTOF10: 10

## 2025-04-02 ASSESSMENT — PAIN DESCRIPTION - DESCRIPTORS: DESCRIPTORS: THROBBING;TIGHTNESS;ACHING

## 2025-04-02 ASSESSMENT — PAIN SCALES - WONG BAKER: WONGBAKER_NUMERICALRESPONSE: NO HURT

## 2025-04-02 ASSESSMENT — PAIN DESCRIPTION - LOCATION: LOCATION: ABDOMEN

## 2025-04-02 ASSESSMENT — PAIN - FUNCTIONAL ASSESSMENT: PAIN_FUNCTIONAL_ASSESSMENT: ACTIVITIES ARE NOT PREVENTED

## 2025-04-02 NOTE — CARE COORDINATION
04/02/25 1038   Readmission Assessment   Number of Days since last admission? 8-30 days   Previous Disposition Home Alone   Who is being Interviewed Caregiver   What was the patient's/caregiver's perception as to why they think they needed to return back to the hospital? Other (Comment)  (chest pain shortness of breath)   Did you visit your Primary Care Physician after you left the hospital, before you returned this time? No   Why weren't you able to visit your PCP? Did not have an appointment   Did you see a specialist, such as Cardiac, Pulmonary, Orthopedic Physician, etc. after you left the hospital? Yes   Who advised the patient to return to the hospital? Self-referral   Does the patient report anything that got in the way of taking their medications? No   In our efforts to provide the best possible care to you and others like you, can you think of anything that we could have done to help you after you left the hospital the first time, so that you might not have needed to return so soon? Arrange for more help when leaving the hospital;Identify patient's health literacy needs     Electronically signed by PAM HUBER on 4/2/2025 at 10:39 AM

## 2025-04-02 NOTE — CONSULTS
history.  Family history negative for kidney disease.     Social History      Social History     Socioeconomic History    Marital status:      Spouse name: None    Number of children: None    Years of education: None    Highest education level: None   Tobacco Use    Smoking status: Never    Smokeless tobacco: Never   Substance and Sexual Activity    Alcohol use: Not Currently    Drug use: Never     Social Drivers of Health     Financial Resource Strain: Low Risk  (4/2/2025)    Overall Financial Resource Strain (CARDIA)     Difficulty of Paying Living Expenses: Not hard at all   Food Insecurity: No Food Insecurity (4/2/2025)    Hunger Vital Sign     Worried About Running Out of Food in the Last Year: Never true     Ran Out of Food in the Last Year: Never true   Transportation Needs: No Transportation Needs (4/2/2025)    Transportation Problems ([facility])     In the past 12 months, has lack of reliable transportation kept you from medical appointments, meetings, work or from getting things needed for daily living?: No   Physical Activity: Unknown (4/2/2025)    Physical Activity ([facility])     In the last 30 days, other than the activities you did for work, on average, how many days per week did you engage in moderate exercise (like walking fast, running, jogging, dancing, swimming, biking, or other similar activites)?: 0     Number of minutes of exercise per week:: 0   Social Connections: Socially Integrated (4/2/2025)    Social Connections ([facility])     If for any reason you need help with day-to-day activities such as bathing, preparing meals, shopping, managing finances, etc., do you get the help you need?: I don't need any help   Intimate Partner Violence: Not At Risk (1/6/2025)    Received from [facility]    Abuse Screen     Feels Unsafe at Home or Work/School: no     Feels Threatened by Someone: no     Does Anyone Try to Keep You From Having Contact with Others or Doing Things Outside Your  Home?: no     Physical Signs of Abuse Present: no       Physical Exam     Blood pressure 135/68, pulse 91, temperature 97 °F (36.1 °C), temperature source Temporal, resp. rate 18, height 1.829 m (6'), weight (!) 143.3 kg (316 lb), SpO2 90%.    General:  NAD, A+Ox3, ill-appearing, normal body habitus  HEENT: Atraumatic, normocephalic  Neck: No JVD, no masses  Chest:  CTAB, good respiratory effort, good air movement  CV:  RRR, soft systolic murmur  Abdomen:  NTND, soft, +BS, no hepatosplenomegaly  Extremities:  1-2 (+) peripheral edema  Neurological:  Moving all four extremities  Lymphatics:  No palpable lymph nodes   Skin:  No rash, no jaundice  Psychiatric:  Normal insight and judgement, good recall    Data     Recent Labs     04/01/25  2357   WBC 8.3   HGB 9.1*   HCT 28.0*   MCV 90.6        Recent Labs     04/01/25  2357      K 4.5      CO2 24   GLUCOSE 308*   BUN 71*   CREATININE 3.4*   LABGLOM 19*       Assessment:  Chronic kidney disease stage IV  Acute on chronic systolic congestive heart failure  Hypertension  Diabetes type 2 with diabetic nephropathy and foot ulcer  Anemia and chronic kidney disease with iron deficient      Plan:  At this stage, we will switch his Bumex to intravenous drip for continuous infusion.  Will also provide metolazone.  There might be changes for kidney function worsening from baseline.  On previous visit, we have offered the patient to go on hemodialysis but his family was reluctant and preferred not to proceed that way yet.  His GFR may be overestimated due to recurrent fluid overload.  Supplement IV iron. Follow up uric acid and PTH.     Thank you for asking us to participate in the management of your patient, please do not hesitate to contact me for any concerns regarding my recommendations as outlined above.    -----------------------------  Electronically signed by Roland Spicer MD on 4/2/25 at 11:01 AM CDT

## 2025-04-02 NOTE — ED NOTES
Pt got up to bedside to use the urinal, states that he became short of breath and \"needs his oxygen\", states that he sometimes wears oxygen at home. Oxygen saturation checked and noted to be 85% on room air, pt placed on 2 LPM via nasal cannula, oxygen saturation returned to 97%, pt states that it is easier to breath now.

## 2025-04-02 NOTE — ED PROVIDER NOTES
Est, Glom Filt Rate 19 (*)     Alkaline Phosphatase 161 (*)     AST 57 (*)     All other components within normal limits   CBC WITH AUTO DIFFERENTIAL - Abnormal; Notable for the following components:    RBC 3.09 (*)     Hemoglobin 9.1 (*)     Hematocrit 28.0 (*)     MCHC 32.5 (*)     RDW 15.0 (*)     Neutrophils % 69.8 (*)     Lymphocytes % 12.8 (*)     Monocytes % 10.9 (*)     Eosinophils % 5.4 (*)     All other components within normal limits   BRAIN NATRIURETIC PEPTIDE - Abnormal; Notable for the following components:    NT Pro-BNP 8,576 (*)     All other components within normal limits   TROPONIN - Abnormal; Notable for the following components:    Troponin, High Sensitivity 122 (*)     All other components within normal limits    Narrative:     CALL  Cruz  [facility] tel. ,  Chemistry results called to and read back by KAJAL GARCIA RN ED, 04/02/2025  01:26, by MADELAINE   TROPONIN - Abnormal; Notable for the following components:    Troponin, High Sensitivity 126 (*)     All other components within normal limits    Narrative:     CALL  Cruz  [employer] tel. ,  Chemistry results called to and read back by DEMARCO LIN RN ED, 04/02/2025  05:31, by MADELAINE       All other labs were within normal range or not returned as of this dictation.    EMERGENCY DEPARTMENT COURSE and DIFFERENTIAL DIAGNOSIS/MDM:   Vitals:    Vitals:    04/02/25 0332 04/02/25 0546 04/02/25 0602 04/02/25 0712   BP: 128/74 131/81 (!) 129/103 (!) 119/57   Pulse: 67 64 62    Resp: 11 18 21    Temp:       TempSrc:       SpO2: 97%      Weight:           MDM    I have independently reviewed patient's laboratory studies, radiographic imaging and EKGs performed while present here in the emergency department.  He presents here to the ED with mild tachypnea and increased work of breathing.  His BNP is elevated at 8500 with an initial high-sensitivity troponin of 122.  Creatinine 3.4/BUN 71.  Potassium looks okay at 4.5.  His bicarb is 24.  His EKG does not demonstrate

## 2025-04-02 NOTE — H&P
[facility]      Hospitalist - History & Physical      PCP: Nirmal Nuno MD    Date of Admission: 4/1/2025    Date of Service: 4/2/2025    Chief Complaint:  shortness of breath and chest pain     History Of Present Illness:   The patient is a 69 y.o. male with past medical history of CAD, s/p CABG, CKD 4 followed by Dr. Freed, Type 2 diabetes, chronic diabetic foot wound followed by Dr. Heaton in [place], [place], Afib on Eliquis, NOBLE uses BiPAP, who presented to [facility] ED for evaluation of progressively worsening shortness of breath and feelings of chest discomfort at times. Stated he took 1 nitro at home without relief. Reported progressively worsening shortness of breath for the past week. Reported dysuria and decreased urinary output over the past 2-3 days. Reported abdominal discomfort. Denied fever or chills. Denied nausea or vomiting. Stated he was seen by Dr. Freed in the office yesterday and changes were made to his diuretics, however has not been able to pick it up due to his pharmacy not currently having it in stock. Of note, patient was discharged from here on 3/14/25 after being admitted for acute on chronic systolic CHF. Limited ECHO was obtained and noted to have EF of 45 to 50% with mild global hypokinesis. Workup in ED revealed BUN 71, Cr 3.4, eGFR 19, Troponin 122 followed by 126, BNP 8576, Hgb 9.1, Hct 28.0, Cxray indicated stable cardiomegaly and small bilateral pleural effusions, with interval improvement in the pulmonary venous congestion and interstitial edema, stable atelectasis of the left lower lobe, pneumonia cannot be excluded.  Patient was admitted to hospital medicine for further evaluation with cardiology and nephrology consultations.        Past Medical History:        Diagnosis Date    CHF (congestive heart failure) (HCC)     Chronic kidney disease     Diabetes mellitus (HCC)     Hyperlipidemia     Hypertension     Palliative care patient 03/07/2025       Past Surgical History:   course/attending.     Signed:  Electronically signed by CORI Rosen CNP on 4/2/25 at 1:52 PM CDT     EMR Dragon/Transcription disclaimer:   Much of this encounter note is an electronic transcription/translation of spoken language to printed text. The electronic translation of spoken language may permit erroneous, or at times, nonsensical words or phrases to be inadvertently transcribed; although attempts have made to review the note for such errors, some may still exist.

## 2025-04-02 NOTE — CONSULTS
[facility]  Cardiology Consult      Requesting MD:  Sarbjit Steve MD   Admit Status:         History obtained from:   [] Patient  [] Other (specify):     PROBLEM LIST:    Patient Active Problem List    Diagnosis Date Noted    Acute on chronic systolic CHF (congestive heart failure) ([facility]) 04/02/2025     Priority: Low    Palliative care patient 03/07/2025     Priority: Low    Acute on chronic congestive heart failure, unspecified heart failure type ([facility]) 03/06/2025     Priority: Low    Gout 03/06/2025     Priority: Low    Memory loss 03/06/2025     Priority: Low    GERD (gastroesophageal reflux disease) 03/06/2025     Priority: Low    BPH (benign prostatic hyperplasia) 03/06/2025     Priority: Low    Neuropathy 03/06/2025     Priority: Low    Diabetes mellitus ([facility]) 02/22/2025     Priority: Low    CHF, left ventricular ([facility]) 02/21/2025     Priority: Low    Acute on chronic systolic heart failure ([facility]) 02/21/2025     Priority: Low    Volume overload 02/21/2025     Priority: Low    CKD (chronic kidney disease) stage 4, GFR 15-29 ml/min ([facility]) 02/21/2025     Priority: Low    Cardiorenal syndrome 02/21/2025     Priority: Low    Chronic obstructive pulmonary disease ([facility]) 12/03/2024     Priority: Low    CAD (coronary artery disease) 01/20/2023     Priority: Low    Diabetic ulcer of left foot associated with type 2 diabetes mellitus 08/31/2020     Priority: Low    Essential hypertension 05/03/2017     Priority: Low     1.  Coronary artery disease, prior CABG 9/2015 with four-vessel grafting, last catheterization 2/8/2021 with occlusive native vessel disease, patent grafts with LIMA to LAD, SVG to diagonal, SVG to OM, SVG to RCA, prior LV ejection fraction reported at 50%, drop in EF 1/8/2025 to 35-40% (all records from [facility]), echo 3/8/2025 with EF 45-50%.  2.  Morbid obesity.  3.  Diabetes mellitus type 2 with diabetic foot ulcer.  4.  CKD stage IV.  5.  Chronic atrial fibrillation on  failure.  COMPARISON: 02/21/2025.  FINDINGS: Lungs/Pleura: Mild to moderate interstitial prominence consistent with vascular congestion. Density in the left lower lobe and blunting of the left costophrenic angle consistent with a small effusion.  Slight blunting of the right costophrenic angle consistent with a small effusion. Heart: The heart is mildly enlarged. Bones: Median sternotomy. Cervical fusion. Other: None.      1.  Mild to moderate edema and small bilateral effusions, left greater than right.    ______________________________________ Electronically signed by: OLIVIA REDDY M.D. Date:     03/06/2025 Time:    11:39           Assessment and Plan:    This is a 69 y.o. year old male with past medical history of severe obesity, diabetes mellitus type 2, CKD stage IV, remote tobacco use, chronic atrial fibrillation on Eliquis, coronary artery disease with prior four-vessel CABG 9/2015, ejection fraction 45-50% (echo 3/8/2025) recurrent admissions with volume overload in the setting of renal dysfunction, presenting again with volume overload.    1.  Patient discharged on Bumex 2 mg orally which is similar to his previous admit dose with heart failure 3/6/2025.  Resultant worsening in volume overload with heart failure presentation and evidence of congestive hepatopathy with elevated ALP.  Limited benefit to IV diuresis in hospital and discharged at same dose of diuretic.  Option would be to initiate limited dialysis long-term or higher level of diuresis with possible addition of Zaroxolyn as an outpatient and monitor closely from renal perspective.  2.  EKG with no significant new changes.  Can monitor serial troponins.  Patient follows with [facility]ology but appears to present here for admissions.  Last catheterization with patent grafts from 2021.  Significant risk of contrast nephropathy and dialysis with dye administration.       Electronically signed by Vladimir Cohen MD on 4/2/2025 at 4:02

## 2025-04-02 NOTE — ED NOTES
ED TO INPATIENT SBAR HANDOFF    Patient Name: Cholo Bhakta   : 1955  69 y.o.   Family/Caregiver Present: No  Code Status Order: Prior    C-SSRS:    Sitter No  Restraints:         Situation  Chief Complaint:   Chief Complaint   Patient presents with    Chest Pain    Shortness of Breath     Soa and cp x2-3 days.      Patient Diagnosis: Acute on chronic systolic CHF (congestive heart failure) (HCC) [I50.23]     Brief Description of Patient's Condition:   Pt being admitted to hospital for Acute CHF, and CKD. Pt given Bumex and morphine while in ER. Pt is not consistent with taking home Bumex. Pt is awake alert and oriented. Pain is more in the abdominal area, not chest area. Pt has been using urinal without any difficulty. Pt is chronically in Afib.   Mental Status: oriented, alert, coherent, logical, thought processes intact, and able to concentrate and follow conversation  Arrived from: home    Imaging:   XR CHEST PORTABLE   Final Result       1.  Stable cardiomegaly and small bilateral pleural effusions, with interval improvement in the pulmonary venous congestion and interstitial edema.   2. Stable atelectasis of the left lower lobe. Pneumonia cannot be excluded.               ______________________________________    Electronically signed by: VICK TOSCANO M.D.   Date:     2025   Time:    06:00         COVID-19 Results:   Internal Administration   First Dose      Second Dose           Last COVID Lab SARS-CoV-2, PCR (no units)   Date Value   2025 Not Detected           Abnormal labs:   Abnormal Labs Reviewed   COMPREHENSIVE METABOLIC PANEL - Abnormal; Notable for the following components:       Result Value    Glucose 308 (*)     BUN 71 (*)     Creatinine 3.4 (*)     Est, Glom Filt Rate 19 (*)     Alkaline Phosphatase 161 (*)     AST 57 (*)     All other components within normal limits   CBC WITH AUTO DIFFERENTIAL - Abnormal; Notable for the following components:    RBC 3.09 (*)     Hemoglobin 9.1    33 (Caution)  Factor Value    Calculated 4/2/2025 08:20 34% Age 69 years old    Deterioration Index Model 24% Respiratory rate 22     13% Hematocrit abnormal (28.0 %)     12% Cardiac rhythm Atrial fib     8% Potassium 4.5 mmol/L     5% Systolic 140     3% BUN abnormal (71 mg/dL)     1% Sodium 139 mmol/L     1% WBC count 8.3 K/uL     0% Temperature 97.9 °F (36.6 °C)     0% Pulse oximetry 96 %     0% Pulse 70       NPO? No  O2 Flow Rate: O2 Device: None (Room air)    Cardiac Rhythm:   NIH Score: NIH     Active LDA's:   Peripheral IV 04/02/25 Posterior;Right Hand (Active)   Site Assessment Clean, dry & intact 04/02/25 0405   Line Status Blood return noted;Brisk blood return;Specimen collected 04/02/25 0405     Pertinent or High Risk Medications/Drips: no   If Yes, please provide details:   Blood Product Administration: no  If Yes, please provide details:   Sepsis Risk Score      Admitted with Sepsis? No    Recommendation  Incomplete orders:   Patient Belongings:   Additional Comments:   If any further questions, please call Sending RN at [phone]    Electronically signed by: Electronically signed by Karen Montoya RN on 4/2/2025 at 8:20 AM

## 2025-04-03 LAB
ANION GAP SERPL CALCULATED.3IONS-SCNC: 10 MMOL/L (ref 8–16)
BASOPHILS # BLD: 0.1 K/UL (ref 0–0.2)
BASOPHILS NFR BLD: 0.7 % (ref 0–1)
BILIRUB UR QL STRIP: NEGATIVE
BUN SERPL-MCNC: 69 MG/DL (ref 8–23)
CALCIUM SERPL-MCNC: 8.9 MG/DL (ref 8.8–10.2)
CHLORIDE SERPL-SCNC: 104 MMOL/L (ref 98–107)
CHOLEST SERPL-MCNC: 142 MG/DL (ref 0–199)
CLARITY UR: CLEAR
CO2 SERPL-SCNC: 27 MMOL/L (ref 22–29)
COLOR UR: YELLOW
CREAT SERPL-MCNC: 3.1 MG/DL (ref 0.7–1.2)
EOSINOPHIL # BLD: 0.5 K/UL (ref 0–0.6)
EOSINOPHIL NFR BLD: 7.3 % (ref 0–5)
ERYTHROCYTE [DISTWIDTH] IN BLOOD BY AUTOMATED COUNT: 15.2 % (ref 11.5–14.5)
GLUCOSE BLD-MCNC: 127 MG/DL (ref 70–99)
GLUCOSE BLD-MCNC: 136 MG/DL (ref 70–99)
GLUCOSE BLD-MCNC: 138 MG/DL (ref 70–99)
GLUCOSE BLD-MCNC: 179 MG/DL (ref 70–99)
GLUCOSE SERPL-MCNC: 156 MG/DL (ref 70–99)
GLUCOSE UR STRIP.AUTO-MCNC: NEGATIVE MG/DL
HCT VFR BLD AUTO: 30.3 % (ref 42–52)
HDLC SERPL-MCNC: 61 MG/DL (ref 40–60)
HGB BLD-MCNC: 9.4 G/DL (ref 14–18)
HGB UR STRIP.AUTO-MCNC: NEGATIVE MG/L
IMM GRANULOCYTES # BLD: 0 K/UL
KETONES UR STRIP.AUTO-MCNC: NEGATIVE MG/DL
LDLC SERPL CALC-MCNC: 68 MG/DL
LEUKOCYTE ESTERASE UR QL STRIP.AUTO: NEGATIVE
LYMPHOCYTES # BLD: 1 K/UL (ref 1.1–4.5)
LYMPHOCYTES NFR BLD: 13.3 % (ref 20–40)
MAGNESIUM SERPL-MCNC: 2.5 MG/DL (ref 1.6–2.4)
MCH RBC QN AUTO: 29.7 PG (ref 27–31)
MCHC RBC AUTO-ENTMCNC: 31 G/DL (ref 33–37)
MCV RBC AUTO: 95.9 FL (ref 80–94)
MONOCYTES # BLD: 0.8 K/UL (ref 0–0.9)
MONOCYTES NFR BLD: 10.9 % (ref 0–10)
NEUTROPHILS # BLD: 4.8 K/UL (ref 1.5–7.5)
NEUTS SEG NFR BLD: 67.4 % (ref 50–65)
NITRITE UR QL STRIP.AUTO: NEGATIVE
PERFORMED ON: ABNORMAL
PH UR STRIP.AUTO: 5.5 [PH] (ref 5–8)
PLATELET # BLD AUTO: 187 K/UL (ref 130–400)
PMV BLD AUTO: 11.4 FL (ref 9.4–12.4)
POTASSIUM SERPL-SCNC: 4.2 MMOL/L (ref 3.5–5)
POTASSIUM SERPL-SCNC: 4.2 MMOL/L (ref 3.5–5.1)
PROT UR STRIP.AUTO-MCNC: NEGATIVE MG/DL
PTH-INTACT SERPL-MCNC: 88.3 PG/ML (ref 15–65)
RBC # BLD AUTO: 3.16 M/UL (ref 4.7–6.1)
SODIUM SERPL-SCNC: 141 MMOL/L (ref 136–145)
SP GR UR STRIP.AUTO: 1.01 (ref 1–1.03)
TRIGL SERPL-MCNC: 66 MG/DL (ref 0–149)
URATE SERPL-MCNC: 9.8 MG/DL (ref 3.4–7)
UROBILINOGEN UR STRIP.AUTO-MCNC: 0.2 E.U./DL
WBC # BLD AUTO: 7.1 K/UL (ref 4.8–10.8)

## 2025-04-03 PROCEDURE — 36415 COLL VENOUS BLD VENIPUNCTURE: CPT

## 2025-04-03 PROCEDURE — 6360000002 HC RX W HCPCS: Performed by: NURSE PRACTITIONER

## 2025-04-03 PROCEDURE — 6360000002 HC RX W HCPCS: Performed by: INTERNAL MEDICINE

## 2025-04-03 PROCEDURE — 94760 N-INVAS EAR/PLS OXIMETRY 1: CPT

## 2025-04-03 PROCEDURE — 1200000000 HC SEMI PRIVATE

## 2025-04-03 PROCEDURE — 6370000000 HC RX 637 (ALT 250 FOR IP): Performed by: NURSE PRACTITIONER

## 2025-04-03 PROCEDURE — 2500000003 HC RX 250 WO HCPCS: Performed by: NURSE PRACTITIONER

## 2025-04-03 PROCEDURE — 99232 SBSQ HOSP IP/OBS MODERATE 35: CPT | Performed by: INTERNAL MEDICINE

## 2025-04-03 PROCEDURE — 80061 LIPID PANEL: CPT

## 2025-04-03 PROCEDURE — 83970 ASSAY OF PARATHORMONE: CPT

## 2025-04-03 PROCEDURE — 81003 URINALYSIS AUTO W/O SCOPE: CPT

## 2025-04-03 PROCEDURE — 82962 GLUCOSE BLOOD TEST: CPT

## 2025-04-03 PROCEDURE — 85025 COMPLETE CBC W/AUTO DIFF WBC: CPT

## 2025-04-03 PROCEDURE — 83735 ASSAY OF MAGNESIUM: CPT

## 2025-04-03 PROCEDURE — 84550 ASSAY OF BLOOD/URIC ACID: CPT

## 2025-04-03 PROCEDURE — 6370000000 HC RX 637 (ALT 250 FOR IP): Performed by: INTERNAL MEDICINE

## 2025-04-03 PROCEDURE — 80048 BASIC METABOLIC PNL TOTAL CA: CPT

## 2025-04-03 RX ORDER — SODIUM HYPOCHLORITE 1.25 MG/ML
SOLUTION TOPICAL 2 TIMES DAILY
Status: DISCONTINUED | OUTPATIENT
Start: 2025-04-03 | End: 2025-04-05 | Stop reason: HOSPADM

## 2025-04-03 RX ADMIN — MORPHINE SULFATE 1 MG: 2 INJECTION, SOLUTION INTRAMUSCULAR; INTRAVENOUS at 23:41

## 2025-04-03 RX ADMIN — TAMSULOSIN HYDROCHLORIDE 0.4 MG: 0.4 CAPSULE ORAL at 09:33

## 2025-04-03 RX ADMIN — METOPROLOL SUCCINATE 50 MG: 50 TABLET, EXTENDED RELEASE ORAL at 09:32

## 2025-04-03 RX ADMIN — FERROUS SULFATE TAB 325 MG (65 MG ELEMENTAL FE) 325 MG: 325 (65 FE) TAB at 19:23

## 2025-04-03 RX ADMIN — APIXABAN 5 MG: 5 TABLET, FILM COATED ORAL at 19:23

## 2025-04-03 RX ADMIN — DAKIN'S SOLUTION 0.125% (QUARTER STRENGTH): 0.12 SOLUTION at 19:27

## 2025-04-03 RX ADMIN — CALCITRIOL CAPSULES 0.25 MCG 0.25 MCG: 0.25 CAPSULE ORAL at 09:33

## 2025-04-03 RX ADMIN — INSULIN GLARGINE 10 UNITS: 100 INJECTION, SOLUTION SUBCUTANEOUS at 19:27

## 2025-04-03 RX ADMIN — ROSUVASTATIN 10 MG: 10 TABLET, FILM COATED ORAL at 19:23

## 2025-04-03 RX ADMIN — METOLAZONE 5 MG: 5 TABLET ORAL at 09:33

## 2025-04-03 RX ADMIN — IRON SUCROSE 200 MG: 20 INJECTION, SOLUTION INTRAVENOUS at 12:17

## 2025-04-03 RX ADMIN — ISOSORBIDE DINITRATE 10 MG: 10 TABLET ORAL at 17:06

## 2025-04-03 RX ADMIN — APIXABAN 5 MG: 5 TABLET, FILM COATED ORAL at 09:33

## 2025-04-03 RX ADMIN — ISOSORBIDE DINITRATE 10 MG: 10 TABLET ORAL at 14:28

## 2025-04-03 RX ADMIN — DONEPEZIL HYDROCHLORIDE 10 MG: 10 TABLET, FILM COATED ORAL at 09:33

## 2025-04-03 RX ADMIN — SODIUM CHLORIDE, PRESERVATIVE FREE 10 ML: 5 INJECTION INTRAVENOUS at 19:22

## 2025-04-03 RX ADMIN — MORPHINE SULFATE 1 MG: 2 INJECTION, SOLUTION INTRAMUSCULAR; INTRAVENOUS at 14:32

## 2025-04-03 RX ADMIN — DAKIN'S SOLUTION 0.125% (QUARTER STRENGTH): 0.12 SOLUTION at 14:13

## 2025-04-03 RX ADMIN — MORPHINE SULFATE 1 MG: 2 INJECTION, SOLUTION INTRAMUSCULAR; INTRAVENOUS at 19:22

## 2025-04-03 RX ADMIN — SODIUM CHLORIDE, PRESERVATIVE FREE 10 ML: 5 INJECTION INTRAVENOUS at 09:33

## 2025-04-03 RX ADMIN — PANTOPRAZOLE SODIUM 40 MG: 40 TABLET, DELAYED RELEASE ORAL at 09:33

## 2025-04-03 RX ADMIN — MORPHINE SULFATE 1 MG: 2 INJECTION, SOLUTION INTRAMUSCULAR; INTRAVENOUS at 02:56

## 2025-04-03 RX ADMIN — FERROUS SULFATE TAB 325 MG (65 MG ELEMENTAL FE) 325 MG: 325 (65 FE) TAB at 09:33

## 2025-04-03 RX ADMIN — OXYCODONE HYDROCHLORIDE 10 MG: 10 TABLET ORAL at 09:32

## 2025-04-03 ASSESSMENT — ENCOUNTER SYMPTOMS
COUGH: 0
ABDOMINAL PAIN: 0
VOMITING: 0
CONSTIPATION: 0
SHORTNESS OF BREATH: 1
BLOOD IN STOOL: 0
COLOR CHANGE: 0
WHEEZING: 0
NAUSEA: 0
ABDOMINAL DISTENTION: 0
DIARRHEA: 0

## 2025-04-03 ASSESSMENT — PAIN SCALES - GENERAL
PAINLEVEL_OUTOF10: 8
PAINLEVEL_OUTOF10: 7
PAINLEVEL_OUTOF10: 4
PAINLEVEL_OUTOF10: 7
PAINLEVEL_OUTOF10: 4
PAINLEVEL_OUTOF10: 2

## 2025-04-03 ASSESSMENT — PAIN DESCRIPTION - LOCATION
LOCATION: ABDOMEN

## 2025-04-03 ASSESSMENT — PAIN - FUNCTIONAL ASSESSMENT
PAIN_FUNCTIONAL_ASSESSMENT: ACTIVITIES ARE NOT PREVENTED
PAIN_FUNCTIONAL_ASSESSMENT: ACTIVITIES ARE NOT PREVENTED

## 2025-04-03 ASSESSMENT — PAIN DESCRIPTION - ORIENTATION: ORIENTATION: RIGHT;LEFT

## 2025-04-03 ASSESSMENT — PAIN DESCRIPTION - DESCRIPTORS
DESCRIPTORS: ACHING

## 2025-04-03 NOTE — CARE COORDINATION
04/03/25 0944   Readmission Assessment   Number of Days since last admission? 8-30 days   Previous Disposition Home Alone   Who is being Interviewed Caregiver   What was the patient's/caregiver's perception as to why they think they needed to return back to the hospital? Other (Comment)   Did you visit your Primary Care Physician after you left the hospital, before you returned this time? No   Why weren't you able to visit your PCP? Did not have an appointment   Did you see a specialist, such as Cardiac, Pulmonary, Orthopedic Physician, etc. after you left the hospital? Yes   Who advised the patient to return to the hospital? Self-referral   Does the patient report anything that got in the way of taking their medications? No   In our efforts to provide the best possible care to you and others like you, can you think of anything that we could have done to help you after you left the hospital the first time, so that you might not have needed to return so soon? Arrange for more help when leaving the hospital;Discharge instructions that are concise, clear, and non contradictory     Electronically signed by PAM HUBER on 4/3/2025 at 9:45 AM

## 2025-04-03 NOTE — ACP (ADVANCE CARE PLANNING)
Advance Care Planning     Advance Care Planning Activator (Inpatient)  Conversation Note      Date of ACP Conversation: 4/3/2025     Conversation Conducted with: Patient with Decision Making Capacity    ACP Activator: Shawna Astorga RN    Health Care Decision Maker:     Current Designated Health Care Decision Maker:     Primary Decision Maker (Active): Dee Bhakta - Gina - [phone]      Care Preferences    Ventilation:  \"If you were in your present state of health and suddenly became very ill and were unable to breathe on your own, what would your preference be about the use of a ventilator (breathing machine) if it were available to you?\"      Would the patient desire the use of ventilator (breathing machine)?: Yes    \"If your health worsens and it becomes clear that your chance of recovery is unlikely, what would your preference be about the use of a ventilator (breathing machine) if it were available to you?\"     Would the patient desire the use of ventilator (breathing machine)?: Yes      Resuscitation  \"CPR works best to restart the heart when there is a sudden event, like a heart attack, in someone who is otherwise healthy. Unfortunately, CPR does not typically restart the heart for people who have serious health conditions or who are very sick.\"    \"In the event your heart stopped as a result of an underlying serious health condition, would you want attempts to be made to restart your heart (answer \"yes\" for attempt to resuscitate) or would you prefer a natural death (answer \"no\" for do not attempt to resuscitate)?\" Yes       [] Yes   [x] No   Educated Patient / Decision Maker regarding differences between Advance Directives and portable DNR orders.    Length of ACP Conversation in minutes:  25 minutes discussing support, goals of care, and ACP documents.    Conversation Outcomes:  ACP discussion completed

## 2025-04-03 NOTE — PROGRESS NOTES
[facility]      Progress Note    Patient:  Cholo Bhakta  YOB: 1955  Date of Service: 4/3/2025  MRN: [id number]   Acct: [id number]   Primary Care Physician: Nirmal Nuno MD  Advance Directive: Full Code  Admit Date: 4/1/2025       Hospital Day: 1    Portions of this note have been copied forward, however, updated to reflect the most current clinical status of this patient.     CHIEF COMPLAINT shortness of breath and chest pain    SUBJECTIVE:  Mr. Bhakta was resting in bed this morning. Reports SOB has improved some. Denies chest pain currently. Urinating well now, denies dysuria.       CUMULATIVE HOSPITAL COURSE:   The patient is a 69 y.o. male with past medical history of CAD, s/p CABG, CKD 4 followed by Dr. Freed, Type 2 diabetes, chronic diabetic foot wound followed by Dr. Heaton in [place], [place], Afib on Eliquis, NOBLE uses BiPAP, who presented to [facility] ED for evaluation of progressively worsening shortness of breath and feelings of chest discomfort at times. Stated he took 1 nitro at home without relief. Reported progressively worsening shortness of breath for the past week. Reported dysuria and decreased urinary output over the past 2-3 days. Reported abdominal discomfort. Denied fever or chills. Denied nausea or vomiting. Stated he was seen by Dr. Freed in the office day prior to admission and changes were made to his diuretics, however had not been able to pick it up due to his pharmacy not currently having it in stock. Of note, patient was discharged from here on 3/14/25 after being admitted for acute on chronic systolic CHF. Limited ECHO was obtained and noted to have EF of 45 to 50% with mild global hypokinesis. Workup in ED revealed BUN 71, Cr 3.4, eGFR 19, Troponin 122 followed by 126, BNP 8576, Hgb 9.1, Hct 28.0, Cxray indicated stable cardiomegaly and small bilateral pleural effusions, with interval improvement in the pulmonary venous congestion and interstitial edema, stable  ([facility])-   - Nephrology following               - Continue IV Bumex gtt and Metolazone               - Creatinine 3.1 today    - UA unremarkable                - Monitor I's and O's closely              - Monitor labs closely              - Avoid hypotension              - Avoid nephrotoxic agents       Essential hypertension-               - Resume home medications        Diabetes mellitus ([facility])-              - A1c (3/10/25) 9.0              - Lantus               - SSI              - Accu-Checks               - Hypoglycemia treatment protocol in place        CAD (coronary artery disease)-               - continue Statin        GERD (gastroesophageal reflux disease)-               - Continue Protonix        Diabetic foot ulcers-    - Wound care consulted    - Continue dressing changes BID with Dakins          DVT Prophylaxis: Eliquis       GI prophylaxis:  Protonix      Discharge planning:  TBD       Advance Directive: Full Code      Further Orders per Clinical course/attending.     Electronically signed by CORI Rosen CNP on 4/3/2025 at 9:48 AM       EMR Dragon/Transcription disclaimer:   Much of this encounter note is an electronic transcription/translation of spoken language to printed text. The electronic translation of spoken language may permit erroneous, or at times, nonsensical words or phrases to be inadvertently transcribed; although attempts have made to review the note for such errors, some may still exist.

## 2025-04-03 NOTE — PROGRESS NOTES
Renal Progress Note    Thank you to requesting provider: Dr Steve, for asking us to see Cholo Bhakta    Reason for consultation:  CKD stage 4.     Chief Complaint:  Dyspnea and fluid overload.     History of Presenting Illness      Cholo Bhakta is a 69 y.o. male for whom we were consulted for evaluation and treatment of  chronic kidney disease stage IV.  He has a history of diabetic nephropathy, hypertension, diabetic foot ulcer, CAD, CHF with frequent episodes of volume overload.  He was recently discharged from this facility on 4/1 with increasing edema and dyspnea. Seen with family initially. Has also been dyspneic and weak. Has no change in his urine habits. He was compliant to his diet and meds.   Patient had good urine output and has been less edematous and dyspneic today.    Past Medical/Surgical History      Active Ambulatory Problems     Diagnosis Date Noted    CHF, left ventricular (HCC) 02/21/2025    Acute on chronic systolic heart failure (HCC) 02/21/2025    Volume overload 02/21/2025    CKD (chronic kidney disease) stage 4, GFR 15-29 ml/min (HCC) 02/21/2025    Cardiorenal syndrome 02/21/2025    Chronic obstructive pulmonary disease (HCC) 12/03/2024    Essential hypertension 05/03/2017    Diabetes mellitus (HCC) 02/22/2025    CAD (coronary artery disease) 01/20/2023    Diabetic ulcer of left foot associated with type 2 diabetes mellitus 08/31/2020    Acute on chronic congestive heart failure, unspecified heart failure type (HCC) 03/06/2025    Gout 03/06/2025    Memory loss 03/06/2025    GERD (gastroesophageal reflux disease) 03/06/2025    BPH (benign prostatic hyperplasia) 03/06/2025    Neuropathy 03/06/2025    Palliative care patient 03/07/2025     Resolved Ambulatory Problems     Diagnosis Date Noted    No Resolved Ambulatory Problems     Past Medical History:   Diagnosis Date    CHF (congestive heart failure) (HCC)     Chronic kidney disease     Hyperlipidemia     Hypertension          Review of  infusion.  Also continue metolazone. On previous visit, we have offered the patient to go on hemodialysis but his family was reluctant and preferred not to proceed that way yet. Supplement iron. Follow up labs.

## 2025-04-03 NOTE — PROGRESS NOTES
4 Eyes Skin Assessment     NAME:  Cholo Bhakta  YOB: 1955  MEDICAL RECORD NUMBER:  [id number]    The patient is being assessed for  Admission    I agree that at least one RN has performed a thorough Head to Toe Skin Assessment on the patient. ALL assessment sites listed below have been assessed.      Areas assessed by both nurses:    Head, Face, Ears, Shoulders, Back, Chest, Arms, Elbows, Hands, Sacrum. Buttock, Coccyx, Ischium, Legs. Feet and Heels, and Under Medical Devices         Does the Patient have a Wound? Yes wound(s) were present on assessment. LDA wound assessment was Initiated and completed by RN     [place] BLE, wound L foot  George Prevention initiated by RN: Yes  Wound Care Orders initiated by RN: Yes    Pressure Injury (Stage 3,4, Unstageable, DTI, NWPT, and Complex wounds) if present, place Wound referral order by RN under : No    New Ostomies, if present place, Ostomy referral order under : No     Nurse 1 eSignature: Electronically signed by Ana María Zepeda RN on 4/3/25 at 9:39 AM CDT    **SHARE this note so that the co-signing nurse can place an eSignature**    Nurse 2 eSignature: Electronically signed by Clarissa Alejandra RN on 4/3/25 at 9:55 AM CDT

## 2025-04-03 NOTE — CONSULTS
Palliative Performance Scale:  50%         Palliative Care team will continue to follow and support, with ongoing review of pt's goals of care.                Electronically signed by Shawna Astorga RN on 4/3/2025 at 11:54 AM

## 2025-04-03 NOTE — CARE COORDINATION
Case Management Assessment  Initial Evaluation    Date/Time of Evaluation: 4/3/2025 9:42 AM  Assessment Completed by: PAM HUBER    If patient is discharged prior to next notation, then this note serves as note for discharge by case management.    Patient Name: Cholo Bhakta                   YOB: 1955  Diagnosis: Acute systolic congestive heart failure (HCC) [I50.21]  Acute on chronic systolic CHF (congestive heart failure) (HCC) [I50.23]  Chronic kidney disease, unspecified CKD stage [N18.9]                   Date / Time: 4/1/2025 11:44 PM    Patient Admission Status: Inpatient   Readmission Risk (Low < 19, Mod (19-27), High > 27): Readmission Risk Score: 27.9    Current PCP: Nirmal Nuno MD  PCP verified by CM? (P) Yes    Chart Reviewed: Yes      History Provided by: (P) Patient  Patient Orientation: (P) Alert and Oriented, Person, Place, Situation, Self    Patient Cognition: (P) Alert    Hospitalization in the last 30 days (Readmission):  Yes    If yes, Readmission Assessment in  Navigator will be completed.    Advance Directives:      Code Status: Full Code   Patient's Primary Decision Maker is: (P) Legal Next of Kin    Primary Decision Maker (Active): Dee Bhakta - Spouse - [phone]    Discharge Planning:    Patient lives with: (P) Spouse/Significant Other Type of Home: (P) House  Primary Care Giver: (P) Self  Patient Support Systems include: (P) Spouse/Significant Other, Children, Family Members   Current Financial resources: (P) Medicare  Current community resources: (P) None  Current services prior to admission: (P) None            Current DME:              Type of Home Care services:  (P) PT, OT    ADLS  Prior functional level: (P) Independent in ADLs/IADLs  Current functional level: (P) Independent in ADLs/IADLs    PT AM-PAC:   /24  OT AM-PAC:   /24    Family can provide assistance at DC: (P) Yes  Would you like Case Management to discuss the discharge plan with any other

## 2025-04-03 NOTE — PLAN OF CARE
Problem: Chronic Conditions and Co-morbidities  Goal: Patient's chronic conditions and co-morbidity symptoms are monitored and maintained or improved  Outcome: Progressing  Flowsheets (Taken 4/3/2025 9944)  Care Plan - Patient's Chronic Conditions and Co-Morbidity Symptoms are Monitored and Maintained or Improved: Monitor and assess patient's chronic conditions and comorbid symptoms for stability, deterioration, or improvement     Problem: Pain  Goal: Verbalizes/displays adequate comfort level or baseline comfort level  Outcome: Progressing     Problem: Skin/Tissue Integrity  Goal: Skin integrity remains intact  Description: 1.  Monitor for areas of redness and/or skin breakdown  2.  Assess vascular access sites hourly  3.  Every 4-6 hours minimum:  Change oxygen saturation probe site  4.  Every 4-6 hours:  If on nasal continuous positive airway pressure, respiratory therapy assess nares and determine need for appliance change or resting period  Outcome: Progressing     Problem: Safety - Adult  Goal: Free from fall injury  Outcome: Progressing     Problem: ABCDS Injury Assessment  Goal: Absence of physical injury  Outcome: Progressing

## 2025-04-03 NOTE — PROGRESS NOTES
[facility]  Nurse  Consult Note       NAME:  Cholo Bhakta  MEDICAL RECORD NUMBER:  [id number]  AGE: 69 y.o.   GENDER: male  : 1955  TODAY'S DATE:  4/3/2025    Subjective   Reason for Wound Nurse Evaluation and Assessment: left foot diabetic foot ulcers      Cholo Bhakta is a 69 y.o. male referred by:   [] Physician  [] Nursing  [] Other:     Wound Identification:  Wound Type: diabetic  Contributing Factors: diabetes and poor glucose control    Wound History: Patient presented to the hospital with chronic diabetic foot wounds to his left foot. These wounds are currently followed outpatient by Dr. Heaton. Patient has not been able to follow up with her since the end of march. The wound beds are pink/red. Only a scant amount of drainage noted. No odor noted.     Explained the importance of prompt follow up with wound care and  Clinic for a specialty shoe (process started my Dr. Frazier office) after discharge from the hospital.       Current Wound Care Treatment:      LEFT FOOT: Cleanse wounds with soap and water with each dressing change. Apply Dakins moistened gauze to the wound bed only. Cover with dry 4x4 fluffs and secure with roll gauze. Change twice daily and PRN       Patient Goal of Care:  [x] Wound Healing  [] Odor Control  [] Palliative Care  [] Pain Control   [] Other:         PAST MEDICAL HISTORY        Diagnosis Date    CHF (congestive heart failure) (HCC)     Chronic kidney disease     Diabetes mellitus (HCC)     Hyperlipidemia     Hypertension     Palliative care patient 2025       PAST SURGICAL HISTORY    Past Surgical History:   Procedure Laterality Date    CORONARY ARTERY BYPASS GRAFT         FAMILY HISTORY    History reviewed. No pertinent family history.    SOCIAL HISTORY    Social History     Tobacco Use    Smoking status: Never    Smokeless tobacco: Never   Substance Use Topics    Alcohol use: Not Currently    Drug use: Never       ALLERGIES    Allergies   Allergen Reactions

## 2025-04-03 NOTE — PROGRESS NOTES
Cardiology Progress Note Vladimir Cohen MD      Patient:  Cholo Bhakta  [id number]    Patient Active Problem List    Diagnosis Date Noted    Acute on chronic systolic CHF (congestive heart failure) ([facility]) 04/02/2025     Priority: Low    Palliative care patient 03/07/2025     Priority: Low    Acute on chronic congestive heart failure, unspecified heart failure type ([facility]) 03/06/2025     Priority: Low    Gout 03/06/2025     Priority: Low    Memory loss 03/06/2025     Priority: Low    GERD (gastroesophageal reflux disease) 03/06/2025     Priority: Low    BPH (benign prostatic hyperplasia) 03/06/2025     Priority: Low    Neuropathy 03/06/2025     Priority: Low    Diabetes mellitus ([facility]) 02/22/2025     Priority: Low    CHF, left ventricular ([facility]) 02/21/2025     Priority: Low    Acute on chronic systolic heart failure ([facility]) 02/21/2025     Priority: Low    Volume overload 02/21/2025     Priority: Low    CKD (chronic kidney disease) stage 4, GFR 15-29 ml/min ([facility]) 02/21/2025     Priority: Low    Cardiorenal syndrome 02/21/2025     Priority: Low    Chronic obstructive pulmonary disease ([facility]) 12/03/2024     Priority: Low    CAD (coronary artery disease) 01/20/2023     Priority: Low    Diabetic ulcer of left foot associated with type 2 diabetes mellitus 08/31/2020     Priority: Low    Essential hypertension 05/03/2017     Priority: Low       Admit Date:  4/1/2025    Admission Problem List: Present on Admission:   Acute on chronic systolic CHF (congestive heart failure) ([facility])   CKD (chronic kidney disease) stage 4, GFR 15-29 ml/min ([facility])   Diabetes mellitus ([facility])   Essential hypertension   GERD (gastroesophageal reflux disease)   CAD (coronary artery disease)      Cardiac Specific Data:  Specialty Problems          Cardiology Problems    Essential hypertension        CAD (coronary artery disease)        Acute on chronic systolic heart failure ([facility])        Cardiorenal syndrome        CHF, left ventricular ([facility])        Acute on chronic

## 2025-04-03 NOTE — PLAN OF CARE
Problem: Chronic Conditions and Co-morbidities  Goal: Patient's chronic conditions and co-morbidity symptoms are monitored and maintained or improved  Outcome: Progressing     Problem: Pain  Goal: Verbalizes/displays adequate comfort level or baseline comfort level  Outcome: Progressing     Problem: Skin/Tissue Integrity  Goal: Skin integrity remains intact  Description: 1.  Monitor for areas of redness and/or skin breakdown  2.  Assess vascular access sites hourly  3.  Every 4-6 hours minimum:  Change oxygen saturation probe site  4.  Every 4-6 hours:  If on nasal continuous positive airway pressure, respiratory therapy assess nares and determine need for appliance change or resting period  Outcome: Progressing     Problem: Safety - Adult  Goal: Free from fall injury  Outcome: Progressing     Problem: ABCDS Injury Assessment  Goal: Absence of physical injury  Outcome: Progressing

## 2025-04-04 LAB
ANION GAP SERPL CALCULATED.3IONS-SCNC: 13 MMOL/L (ref 8–16)
BASOPHILS # BLD: 0.1 K/UL (ref 0–0.2)
BASOPHILS NFR BLD: 0.8 % (ref 0–1)
BNP BLD-MCNC: 8765 PG/ML (ref 0–124)
BUN SERPL-MCNC: 66 MG/DL (ref 8–23)
CALCIUM SERPL-MCNC: 9.2 MG/DL (ref 8.8–10.2)
CHLORIDE SERPL-SCNC: 99 MMOL/L (ref 98–107)
CO2 SERPL-SCNC: 29 MMOL/L (ref 22–29)
CREAT SERPL-MCNC: 3.1 MG/DL (ref 0.7–1.2)
EKG P AXIS: NORMAL DEGREES
EKG P-R INTERVAL: NORMAL MS
EKG Q-T INTERVAL: 444 MS
EKG QRS DURATION: 120 MS
EKG QTC CALCULATION (BAZETT): 454 MS
EKG T AXIS: 3 DEGREES
EOSINOPHIL # BLD: 0.5 K/UL (ref 0–0.6)
EOSINOPHIL NFR BLD: 7.5 % (ref 0–5)
ERYTHROCYTE [DISTWIDTH] IN BLOOD BY AUTOMATED COUNT: 15.1 % (ref 11.5–14.5)
GLUCOSE BLD-MCNC: 147 MG/DL (ref 70–99)
GLUCOSE BLD-MCNC: 161 MG/DL (ref 70–99)
GLUCOSE BLD-MCNC: 237 MG/DL (ref 70–99)
GLUCOSE BLD-MCNC: 286 MG/DL (ref 70–99)
GLUCOSE SERPL-MCNC: 197 MG/DL (ref 70–99)
HCT VFR BLD AUTO: 32 % (ref 42–52)
HGB BLD-MCNC: 9.7 G/DL (ref 14–18)
IMM GRANULOCYTES # BLD: 0 K/UL
LYMPHOCYTES # BLD: 1 K/UL (ref 1.1–4.5)
LYMPHOCYTES NFR BLD: 13.4 % (ref 20–40)
MAGNESIUM SERPL-MCNC: 2.5 MG/DL (ref 1.6–2.4)
MCH RBC QN AUTO: 28.6 PG (ref 27–31)
MCHC RBC AUTO-ENTMCNC: 30.3 G/DL (ref 33–37)
MCV RBC AUTO: 94.4 FL (ref 80–94)
MONOCYTES # BLD: 0.6 K/UL (ref 0–0.9)
MONOCYTES NFR BLD: 8.9 % (ref 0–10)
NEUTROPHILS # BLD: 5 K/UL (ref 1.5–7.5)
NEUTS SEG NFR BLD: 69 % (ref 50–65)
PERFORMED ON: ABNORMAL
PLATELET # BLD AUTO: 230 K/UL (ref 130–400)
PMV BLD AUTO: 12.2 FL (ref 9.4–12.4)
POTASSIUM SERPL-SCNC: 4.1 MMOL/L (ref 3.5–5)
RBC # BLD AUTO: 3.39 M/UL (ref 4.7–6.1)
SODIUM SERPL-SCNC: 141 MMOL/L (ref 136–145)
WBC # BLD AUTO: 7.2 K/UL (ref 4.8–10.8)

## 2025-04-04 PROCEDURE — 6370000000 HC RX 637 (ALT 250 FOR IP): Performed by: NURSE PRACTITIONER

## 2025-04-04 PROCEDURE — 80048 BASIC METABOLIC PNL TOTAL CA: CPT

## 2025-04-04 PROCEDURE — 93010 ELECTROCARDIOGRAM REPORT: CPT | Performed by: INTERNAL MEDICINE

## 2025-04-04 PROCEDURE — 83735 ASSAY OF MAGNESIUM: CPT

## 2025-04-04 PROCEDURE — 2500000003 HC RX 250 WO HCPCS: Performed by: NURSE PRACTITIONER

## 2025-04-04 PROCEDURE — 82962 GLUCOSE BLOOD TEST: CPT

## 2025-04-04 PROCEDURE — 1200000000 HC SEMI PRIVATE

## 2025-04-04 PROCEDURE — 94761 N-INVAS EAR/PLS OXIMETRY MLT: CPT

## 2025-04-04 PROCEDURE — 36415 COLL VENOUS BLD VENIPUNCTURE: CPT

## 2025-04-04 PROCEDURE — 83880 ASSAY OF NATRIURETIC PEPTIDE: CPT

## 2025-04-04 PROCEDURE — 2700000000 HC OXYGEN THERAPY PER DAY

## 2025-04-04 PROCEDURE — 99232 SBSQ HOSP IP/OBS MODERATE 35: CPT | Performed by: INTERNAL MEDICINE

## 2025-04-04 PROCEDURE — 85025 COMPLETE CBC W/AUTO DIFF WBC: CPT

## 2025-04-04 PROCEDURE — 94760 N-INVAS EAR/PLS OXIMETRY 1: CPT

## 2025-04-04 PROCEDURE — 6370000000 HC RX 637 (ALT 250 FOR IP): Performed by: INTERNAL MEDICINE

## 2025-04-04 PROCEDURE — 94660 CPAP INITIATION&MGMT: CPT

## 2025-04-04 PROCEDURE — 6360000002 HC RX W HCPCS: Performed by: NURSE PRACTITIONER

## 2025-04-04 PROCEDURE — 6360000002 HC RX W HCPCS: Performed by: INTERNAL MEDICINE

## 2025-04-04 PROCEDURE — 94618 PULMONARY STRESS TESTING: CPT

## 2025-04-04 RX ORDER — BUMETANIDE 1 MG/1
3 TABLET ORAL 2 TIMES DAILY
Status: DISCONTINUED | OUTPATIENT
Start: 2025-04-04 | End: 2025-04-05 | Stop reason: HOSPADM

## 2025-04-04 RX ADMIN — MORPHINE SULFATE 1 MG: 2 INJECTION, SOLUTION INTRAMUSCULAR; INTRAVENOUS at 03:34

## 2025-04-04 RX ADMIN — TAMSULOSIN HYDROCHLORIDE 0.4 MG: 0.4 CAPSULE ORAL at 08:37

## 2025-04-04 RX ADMIN — INSULIN LISPRO 2 UNITS: 100 INJECTION, SOLUTION INTRAVENOUS; SUBCUTANEOUS at 20:57

## 2025-04-04 RX ADMIN — IRON SUCROSE 200 MG: 20 INJECTION, SOLUTION INTRAVENOUS at 13:39

## 2025-04-04 RX ADMIN — ISOSORBIDE DINITRATE 10 MG: 10 TABLET ORAL at 08:38

## 2025-04-04 RX ADMIN — FERROUS SULFATE TAB 325 MG (65 MG ELEMENTAL FE) 325 MG: 325 (65 FE) TAB at 20:57

## 2025-04-04 RX ADMIN — MORPHINE SULFATE 1 MG: 2 INJECTION, SOLUTION INTRAMUSCULAR; INTRAVENOUS at 13:27

## 2025-04-04 RX ADMIN — SODIUM CHLORIDE, PRESERVATIVE FREE 10 ML: 5 INJECTION INTRAVENOUS at 20:58

## 2025-04-04 RX ADMIN — FERROUS SULFATE TAB 325 MG (65 MG ELEMENTAL FE) 325 MG: 325 (65 FE) TAB at 08:38

## 2025-04-04 RX ADMIN — ISOSORBIDE DINITRATE 10 MG: 10 TABLET ORAL at 17:21

## 2025-04-04 RX ADMIN — ROSUVASTATIN 10 MG: 10 TABLET, FILM COATED ORAL at 20:57

## 2025-04-04 RX ADMIN — PANTOPRAZOLE SODIUM 40 MG: 40 TABLET, DELAYED RELEASE ORAL at 08:37

## 2025-04-04 RX ADMIN — BUMETANIDE 3 MG: 1 TABLET ORAL at 17:22

## 2025-04-04 RX ADMIN — BUMETANIDE 3 MG: 1 TABLET ORAL at 08:38

## 2025-04-04 RX ADMIN — SODIUM CHLORIDE, PRESERVATIVE FREE 10 ML: 5 INJECTION INTRAVENOUS at 08:40

## 2025-04-04 RX ADMIN — CALCITRIOL CAPSULES 0.25 MCG 0.25 MCG: 0.25 CAPSULE ORAL at 08:38

## 2025-04-04 RX ADMIN — DAKIN'S SOLUTION 0.125% (QUARTER STRENGTH): 0.12 SOLUTION at 14:09

## 2025-04-04 RX ADMIN — APIXABAN 5 MG: 5 TABLET, FILM COATED ORAL at 08:38

## 2025-04-04 RX ADMIN — ONDANSETRON 4 MG: 2 INJECTION INTRAMUSCULAR; INTRAVENOUS at 21:02

## 2025-04-04 RX ADMIN — MORPHINE SULFATE 1 MG: 2 INJECTION, SOLUTION INTRAMUSCULAR; INTRAVENOUS at 09:00

## 2025-04-04 RX ADMIN — DAKIN'S SOLUTION 0.125% (QUARTER STRENGTH): 0.12 SOLUTION at 20:58

## 2025-04-04 RX ADMIN — METOLAZONE 5 MG: 5 TABLET ORAL at 08:38

## 2025-04-04 RX ADMIN — ISOSORBIDE DINITRATE 10 MG: 10 TABLET ORAL at 13:27

## 2025-04-04 RX ADMIN — INSULIN GLARGINE 10 UNITS: 100 INJECTION, SOLUTION SUBCUTANEOUS at 20:57

## 2025-04-04 RX ADMIN — APIXABAN 5 MG: 5 TABLET, FILM COATED ORAL at 20:57

## 2025-04-04 RX ADMIN — INSULIN LISPRO 1 UNITS: 100 INJECTION, SOLUTION INTRAVENOUS; SUBCUTANEOUS at 17:22

## 2025-04-04 RX ADMIN — METOPROLOL SUCCINATE 50 MG: 50 TABLET, EXTENDED RELEASE ORAL at 08:38

## 2025-04-04 RX ADMIN — ONDANSETRON 4 MG: 2 INJECTION INTRAMUSCULAR; INTRAVENOUS at 11:35

## 2025-04-04 RX ADMIN — DONEPEZIL HYDROCHLORIDE 10 MG: 10 TABLET, FILM COATED ORAL at 08:38

## 2025-04-04 ASSESSMENT — PAIN DESCRIPTION - LOCATION
LOCATION: ABDOMEN

## 2025-04-04 ASSESSMENT — ENCOUNTER SYMPTOMS
SHORTNESS OF BREATH: 1
ABDOMINAL PAIN: 0
NAUSEA: 1
BLOOD IN STOOL: 0
DIARRHEA: 0
CONSTIPATION: 0
VOMITING: 0
ABDOMINAL DISTENTION: 0
COUGH: 0
WHEEZING: 0
COLOR CHANGE: 0

## 2025-04-04 ASSESSMENT — PAIN SCALES - GENERAL
PAINLEVEL_OUTOF10: 7
PAINLEVEL_OUTOF10: 3
PAINLEVEL_OUTOF10: 2
PAINLEVEL_OUTOF10: 7
PAINLEVEL_OUTOF10: 8

## 2025-04-04 ASSESSMENT — PAIN DESCRIPTION - ORIENTATION
ORIENTATION: MID
ORIENTATION: MID

## 2025-04-04 ASSESSMENT — PAIN DESCRIPTION - DESCRIPTORS
DESCRIPTORS: ACHING

## 2025-04-04 NOTE — PLAN OF CARE
Problem: Chronic Conditions and Co-morbidities  Goal: Patient's chronic conditions and co-morbidity symptoms are monitored and maintained or improved  Outcome: Progressing     Problem: Pain  Goal: Verbalizes/displays adequate comfort level or baseline comfort level  Outcome: Progressing     Problem: Skin/Tissue Integrity  Goal: Skin integrity remains intact  Description: 1.  Monitor for areas of redness and/or skin breakdown  2.  Assess vascular access sites hourly  3.  Every 4-6 hours minimum:  Change oxygen saturation probe site  4.  Every 4-6 hours:  If on nasal continuous positive airway pressure, respiratory therapy assess nares and determine need for appliance change or resting period  Outcome: Progressing     Problem: Safety - Adult  Goal: Free from fall injury  Outcome: Progressing

## 2025-04-04 NOTE — PROGRESS NOTES
[facility]      Progress Note    Patient:  Cholo Bhakta  YOB: 1955  Date of Service: 4/4/2025  MRN: [id number]   Acct: [id number]   Primary Care Physician: Nirmal Nuno MD  Advance Directive: Full Code  Admit Date: 4/1/2025       Hospital Day: 2    Portions of this note have been copied forward, however, updated to reflect the most current clinical status of this patient.     CHIEF COMPLAINT shortness of breath and chest pain    SUBJECTIVE:  Mr. Bhakta was resting in bed this morning. Continues to report SOB with exertion. Denies chest pain. Continues to report Nausea.       CUMULATIVE HOSPITAL COURSE:   The patient is a 69 y.o. male with past medical history of CAD, s/p CABG, CKD 4 followed by Dr. Freed, Type 2 diabetes, chronic diabetic foot wound followed by Dr. Heaton in [place], [place], Afib on Eliquis, NOBLE uses BiPAP, who presented to [facility] ED for evaluation of progressively worsening shortness of breath and feelings of chest discomfort at times. Stated he took 1 nitro at home without relief. Reported progressively worsening shortness of breath for the past week. Reported dysuria and decreased urinary output over the past 2-3 days. Reported abdominal discomfort. Denied fever or chills. Denied nausea or vomiting. Stated he was seen by Dr. Freed in the office day prior to admission and changes were made to his diuretics, however had not been able to pick it up due to his pharmacy not currently having it in stock. Of note, patient was discharged from here on 3/14/25 after being admitted for acute on chronic systolic CHF. Limited ECHO was obtained and noted to have EF of 45 to 50% with mild global hypokinesis. Workup in ED revealed BUN 71, Cr 3.4, eGFR 19, Troponin 122 followed by 126, BNP 8576, Hgb 9.1, Hct 28.0, Cxray indicated stable cardiomegaly and small bilateral pleural effusions, with interval improvement in the pulmonary venous congestion and interstitial edema, stable atelectasis of the  and Metolazone               - Creatinine 3.1 today    - UA unremarkable                - Monitor I's and O's closely              - Monitor labs closely              - Avoid hypotension              - Avoid nephrotoxic agents       Essential hypertension-                - stable at this time     - Continue current medications        Diabetes mellitus (HCC)-              - A1c (3/10/25) 9.0              - Lantus               - SSI              - Accu-Checks               - Hypoglycemia treatment protocol in place        CAD (coronary artery disease)-               - continue Statin        GERD (gastroesophageal reflux disease)-               - Continue Protonix        Diabetic foot ulcers-    - Wound care consulted    - Continue dressing changes BID with Dakins   - Follow up with Podiatry, Dr. Heaton outpatient           DVT Prophylaxis: Eliquis       GI prophylaxis:  Protonix      Discharge planning:  TBD       Advance Directive: Full Code      Further Orders per Clinical course/attending.     Electronically signed by CORI Rosen CNP on 4/4/2025 at 3:30 PM       EMR Dragon/Transcription disclaimer:   Much of this encounter note is an electronic transcription/translation of spoken language to printed text. The electronic translation of spoken language may permit erroneous, or at times, nonsensical words or phrases to be inadvertently transcribed; although attempts have made to review the note for such errors, some may still exist.

## 2025-04-04 NOTE — PLAN OF CARE
Problem: Chronic Conditions and Co-morbidities  Goal: Patient's chronic conditions and co-morbidity symptoms are monitored and maintained or improved  4/3/2025 2119 by Cadence Paniagua RN  Outcome: Progressing  4/3/2025 1458 by Ana María Zepeda RN  Outcome: Progressing  Flowsheets (Taken 4/3/2025 0919)  Care Plan - Patient's Chronic Conditions and Co-Morbidity Symptoms are Monitored and Maintained or Improved: Monitor and assess patient's chronic conditions and comorbid symptoms for stability, deterioration, or improvement     Problem: Pain  Goal: Verbalizes/displays adequate comfort level or baseline comfort level  4/3/2025 2119 by Cadence Paniagua RN  Outcome: Progressing  4/3/2025 1458 by Ana María Zepeda RN  Outcome: Progressing     Problem: Skin/Tissue Integrity  Goal: Skin integrity remains intact  Description: 1.  Monitor for areas of redness and/or skin breakdown  2.  Assess vascular access sites hourly  3.  Every 4-6 hours minimum:  Change oxygen saturation probe site  4.  Every 4-6 hours:  If on nasal continuous positive airway pressure, respiratory therapy assess nares and determine need for appliance change or resting period  4/3/2025 2119 by Cadence Paniagua RN  Outcome: Progressing  4/3/2025 1458 by Ana María Zepeda RN  Outcome: Progressing     Problem: Safety - Adult  Goal: Free from fall injury  4/3/2025 2119 by Cadence Paniagua RN  Outcome: Progressing  4/3/2025 1458 by Ana María Zepeda RN  Outcome: Progressing     Problem: ABCDS Injury Assessment  Goal: Absence of physical injury  4/3/2025 2119 by Cadence Paniagua RN  Outcome: Progressing  4/3/2025 1458 by Ana María Zepeda RN  Outcome: Progressing

## 2025-04-04 NOTE — PROGRESS NOTES
04/04/25 1236   Resting (Room Air)   SpO2 94   Resting (On O2)   SpO2 94   O2 Device Nasal cannula   O2 Flow Rate (l/min) 1.5 l/min   During Walk (Room Air)   SpO2 95   Walk/Assistance Device Walker   Rate of Dyspnea 0   During Walk (On O2)   Need Additional O2 Flow Rate Rows No   Walk/Assistance Device Walker   After Walk   SpO2 93   Does the Patient Qualify for Home O2 No   Does the Patient Need Portable Oxygen Tanks No

## 2025-04-04 NOTE — PROGRESS NOTES
Cardiology Progress Note Vladimir Cohen MD      Patient:  Cholo Bhakta  [id number]    Patient Active Problem List    Diagnosis Date Noted    Acute on chronic systolic CHF (congestive heart failure) ([facility]) 04/02/2025     Priority: Low    Palliative care patient 03/07/2025     Priority: Low    Acute on chronic congestive heart failure, unspecified heart failure type ([facility]) 03/06/2025     Priority: Low    Gout 03/06/2025     Priority: Low    Memory loss 03/06/2025     Priority: Low    GERD (gastroesophageal reflux disease) 03/06/2025     Priority: Low    BPH (benign prostatic hyperplasia) 03/06/2025     Priority: Low    Neuropathy 03/06/2025     Priority: Low    Diabetes mellitus ([facility]) 02/22/2025     Priority: Low    CHF, left ventricular ([facility]) 02/21/2025     Priority: Low    Acute on chronic systolic heart failure ([facility]) 02/21/2025     Priority: Low    Volume overload 02/21/2025     Priority: Low    CKD (chronic kidney disease) stage 4, GFR 15-29 ml/min ([facility]) 02/21/2025     Priority: Low    Cardiorenal syndrome 02/21/2025     Priority: Low    Chronic obstructive pulmonary disease ([facility]) 12/03/2024     Priority: Low    CAD (coronary artery disease) 01/20/2023     Priority: Low    Diabetic ulcer of left foot associated with type 2 diabetes mellitus 08/31/2020     Priority: Low    Essential hypertension 05/03/2017     Priority: Low       Admit Date:  4/1/2025    Admission Problem List: Present on Admission:   Acute on chronic systolic CHF (congestive heart failure) ([facility])   CKD (chronic kidney disease) stage 4, GFR 15-29 ml/min ([facility])   Diabetes mellitus ([facility])   Essential hypertension   GERD (gastroesophageal reflux disease)   CAD (coronary artery disease)      Cardiac Specific Data:  Specialty Problems          Cardiology Problems    Essential hypertension        CAD (coronary artery disease)        Acute on chronic systolic heart failure ([facility])        Cardiorenal syndrome        CHF, left ventricular ([facility])        Acute on chronic  is no organomegaly or suspicious calcification.  No acute bony finding.     ______________________________________ Electronically signed by: ALISON FRAGOSO M.D. Date:     03/12/2025 Time:    13:30     Echo (TTE) limited (PRN contrast/bubble/strain/3D)  Result Date: 3/8/2025    Left Ventricle: Mildly reduced left ventricular systolic function with a visually estimated EF of 45 - 50%. EF by visual approximation is 45%. Left ventricle size is normal. Moderately increased wall thickness. Mild global hypokinesis present.   Left Atrium: Left atrium is dilated. Limited echocardiography for LV function.     CT ABDOMEN PELVIS WO CONTRAST Additional Contrast? None  Result Date: 3/7/2025  EXAM: CT ABDOMEN AND PELVIS WITHOUT CONTRAST  HISTORY: Abdominal pain and nausea  TECHNIQUE: CT acquisition of the abdomen and pelvis from the lower thorax through the pelvis without IV contrast administration.  2-D coronal and sagittal reformatted images were obtained from the axial source images. Oral Contrast: None.   COMPARISON: None.  FINDINGS: Please note that evaluation of the solid organs is limited on noncontrast study.  Lower Thorax: Moderate dependently layering bilateral pleural effusions with adjacent bilateral lower lobe compressive atelectasis.  Peripheral calcified granuloma of the right lower lobe.  Chronic right-sided rib fractures.  Multivessel coronary calcifications.  Median sternotomy wires.  Liver: Normal size and contour. Normal overall attenuation. Gallbladder: Surgically absent. Bile ducts: No intra- or extrahepatic biliary ductal dilatation. Pancreas: No mass or evidence of pancreatitis. No duct dilation. Parenchymal fatty atrophy. Spleen: Normal size. Adrenals: No mass.  Kidneys/Ureters: No calculus or hydronephrosis. Fluid density exophytic renal cysts measuring 4.3 cm on the right and 1.5 cm on the left Bladder:  No calculi Vasculature: Atherosclerotic calcification of a nonaneurysmal abdominal aorta.   GI Tract:
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
## 2025-04-04 NOTE — CARE COORDINATION
04/04/25 1142   IMM Letter   IMM Letter given to Patient/Family/Significant other/Guardian/POA/by: Dmitry Leung   IMM Letter date given: 04/04/25   IMM Letter time given: 1129     Patient declined waiting 4 hr period prior to discharge.  Second IMM given to patient and explained with patient verbalizing understanding.  All questions and concerns addressed     Signed letter placed in pt soft chart  Electronically signed by PAM HUBER on 4/4/2025 at 11:42 AM

## 2025-04-04 NOTE — PROGRESS NOTES
Renal Progress Note    Thank you to requesting provider: Dr Steve, for asking us to see Cholo Bhakta    Reason for consultation:  CKD stage 4.     Chief Complaint:  Dyspnea and fluid overload.     History of Presenting Illness      Cholo Bhakta is a 69 y.o. male for whom we were consulted for evaluation and treatment of  chronic kidney disease stage IV.  He has a history of diabetic nephropathy, hypertension, diabetic foot ulcer, CAD, CHF with frequent episodes of volume overload.  He was recently discharged from this facility on 4/1 with increasing edema and dyspnea. Seen with family initially. Has also been dyspneic and weak. Has no change in his urine habits. He was compliant to his diet and meds.   Patient had good urine output and has been less edematous bit complained of more dyspnea today.    Past Medical/Surgical History      Active Ambulatory Problems     Diagnosis Date Noted    CHF, left ventricular (HCC) 02/21/2025    Acute on chronic systolic heart failure (HCC) 02/21/2025    Volume overload 02/21/2025    CKD (chronic kidney disease) stage 4, GFR 15-29 ml/min ([facility]) 02/21/2025    Cardiorenal syndrome 02/21/2025    Chronic obstructive pulmonary disease (HCC) 12/03/2024    Essential hypertension 05/03/2017    Diabetes mellitus (HCC) 02/22/2025    CAD (coronary artery disease) 01/20/2023    Diabetic ulcer of left foot associated with type 2 diabetes mellitus 08/31/2020    Acute on chronic congestive heart failure, unspecified heart failure type (HCC) 03/06/2025    Gout 03/06/2025    Memory loss 03/06/2025    GERD (gastroesophageal reflux disease) 03/06/2025    BPH (benign prostatic hyperplasia) 03/06/2025    Neuropathy 03/06/2025    Palliative care patient 03/07/2025     Resolved Ambulatory Problems     Diagnosis Date Noted    No Resolved Ambulatory Problems     Past Medical History:   Diagnosis Date    CHF (congestive heart failure) (HCC)     Chronic kidney disease     Hyperlipidemia     Hypertension

## 2025-04-05 VITALS
RESPIRATION RATE: 18 BRPM | SYSTOLIC BLOOD PRESSURE: 109 MMHG | HEART RATE: 66 BPM | BODY MASS INDEX: 40.58 KG/M2 | OXYGEN SATURATION: 98 % | DIASTOLIC BLOOD PRESSURE: 53 MMHG | HEIGHT: 72 IN | WEIGHT: 299.6 LBS | TEMPERATURE: 97.5 F

## 2025-04-05 LAB
ANION GAP SERPL CALCULATED.3IONS-SCNC: 14 MMOL/L (ref 8–16)
BASOPHILS # BLD: 0.1 K/UL (ref 0–0.2)
BASOPHILS NFR BLD: 0.9 % (ref 0–1)
BUN SERPL-MCNC: 69 MG/DL (ref 8–23)
CALCIUM SERPL-MCNC: 9.1 MG/DL (ref 8.8–10.2)
CHLORIDE SERPL-SCNC: 96 MMOL/L (ref 98–107)
CO2 SERPL-SCNC: 26 MMOL/L (ref 22–29)
CREAT SERPL-MCNC: 3.3 MG/DL (ref 0.7–1.2)
EOSINOPHIL # BLD: 0.4 K/UL (ref 0–0.6)
EOSINOPHIL NFR BLD: 6.5 % (ref 0–5)
ERYTHROCYTE [DISTWIDTH] IN BLOOD BY AUTOMATED COUNT: 15 % (ref 11.5–14.5)
GLUCOSE BLD-MCNC: 125 MG/DL (ref 70–99)
GLUCOSE BLD-MCNC: 285 MG/DL (ref 70–99)
GLUCOSE SERPL-MCNC: 342 MG/DL (ref 70–99)
HCT VFR BLD AUTO: 31.9 % (ref 42–52)
HGB BLD-MCNC: 10.1 G/DL (ref 14–18)
IMM GRANULOCYTES # BLD: 0 K/UL
LYMPHOCYTES # BLD: 1 K/UL (ref 1.1–4.5)
LYMPHOCYTES NFR BLD: 15.3 % (ref 20–40)
MAGNESIUM SERPL-MCNC: 2.5 MG/DL (ref 1.6–2.4)
MCH RBC QN AUTO: 29.3 PG (ref 27–31)
MCHC RBC AUTO-ENTMCNC: 31.7 G/DL (ref 33–37)
MCV RBC AUTO: 92.5 FL (ref 80–94)
MONOCYTES # BLD: 0.8 K/UL (ref 0–0.9)
MONOCYTES NFR BLD: 11.8 % (ref 0–10)
NEUTROPHILS # BLD: 4.3 K/UL (ref 1.5–7.5)
NEUTS SEG NFR BLD: 65 % (ref 50–65)
PERFORMED ON: ABNORMAL
PERFORMED ON: ABNORMAL
PLATELET # BLD AUTO: 235 K/UL (ref 130–400)
PMV BLD AUTO: 11.7 FL (ref 9.4–12.4)
POTASSIUM SERPL-SCNC: 4.6 MMOL/L (ref 3.5–5)
RBC # BLD AUTO: 3.45 M/UL (ref 4.7–6.1)
SODIUM SERPL-SCNC: 136 MMOL/L (ref 136–145)
WBC # BLD AUTO: 6.6 K/UL (ref 4.8–10.8)

## 2025-04-05 PROCEDURE — 6370000000 HC RX 637 (ALT 250 FOR IP): Performed by: INTERNAL MEDICINE

## 2025-04-05 PROCEDURE — 85025 COMPLETE CBC W/AUTO DIFF WBC: CPT

## 2025-04-05 PROCEDURE — 83735 ASSAY OF MAGNESIUM: CPT

## 2025-04-05 PROCEDURE — 80048 BASIC METABOLIC PNL TOTAL CA: CPT

## 2025-04-05 PROCEDURE — 2500000003 HC RX 250 WO HCPCS: Performed by: NURSE PRACTITIONER

## 2025-04-05 PROCEDURE — 6360000002 HC RX W HCPCS: Performed by: NURSE PRACTITIONER

## 2025-04-05 PROCEDURE — 36415 COLL VENOUS BLD VENIPUNCTURE: CPT

## 2025-04-05 PROCEDURE — 82962 GLUCOSE BLOOD TEST: CPT

## 2025-04-05 PROCEDURE — 94760 N-INVAS EAR/PLS OXIMETRY 1: CPT

## 2025-04-05 PROCEDURE — 6370000000 HC RX 637 (ALT 250 FOR IP): Performed by: NURSE PRACTITIONER

## 2025-04-05 RX ORDER — BUMETANIDE 2 MG/1
3 TABLET ORAL 2 TIMES DAILY
Qty: 90 TABLET | Refills: 0 | Status: SHIPPED | OUTPATIENT
Start: 2025-04-05

## 2025-04-05 RX ORDER — METOLAZONE 5 MG/1
5 TABLET ORAL
Qty: 15 TABLET | Refills: 0 | Status: SHIPPED | OUTPATIENT
Start: 2025-04-07

## 2025-04-05 RX ORDER — METOLAZONE 5 MG/1
5 TABLET ORAL
Status: DISCONTINUED | OUTPATIENT
Start: 2025-04-07 | End: 2025-04-05 | Stop reason: HOSPADM

## 2025-04-05 RX ADMIN — SODIUM CHLORIDE, PRESERVATIVE FREE 10 ML: 5 INJECTION INTRAVENOUS at 12:00

## 2025-04-05 RX ADMIN — ISOSORBIDE DINITRATE 10 MG: 10 TABLET ORAL at 08:56

## 2025-04-05 RX ADMIN — PANTOPRAZOLE SODIUM 40 MG: 40 TABLET, DELAYED RELEASE ORAL at 08:56

## 2025-04-05 RX ADMIN — CALCITRIOL CAPSULES 0.25 MCG 0.25 MCG: 0.25 CAPSULE ORAL at 08:56

## 2025-04-05 RX ADMIN — APIXABAN 5 MG: 5 TABLET, FILM COATED ORAL at 08:56

## 2025-04-05 RX ADMIN — METOPROLOL SUCCINATE 50 MG: 50 TABLET, EXTENDED RELEASE ORAL at 08:55

## 2025-04-05 RX ADMIN — DAKIN'S SOLUTION 0.125% (QUARTER STRENGTH): 0.12 SOLUTION at 09:00

## 2025-04-05 RX ADMIN — FERROUS SULFATE TAB 325 MG (65 MG ELEMENTAL FE) 325 MG: 325 (65 FE) TAB at 08:56

## 2025-04-05 RX ADMIN — INSULIN LISPRO 2 UNITS: 100 INJECTION, SOLUTION INTRAVENOUS; SUBCUTANEOUS at 09:01

## 2025-04-05 RX ADMIN — SODIUM CHLORIDE, PRESERVATIVE FREE 10 ML: 5 INJECTION INTRAVENOUS at 08:58

## 2025-04-05 RX ADMIN — ONDANSETRON 4 MG: 2 INJECTION INTRAMUSCULAR; INTRAVENOUS at 12:00

## 2025-04-05 RX ADMIN — OXYCODONE HYDROCHLORIDE 10 MG: 10 TABLET ORAL at 01:07

## 2025-04-05 RX ADMIN — TAMSULOSIN HYDROCHLORIDE 0.4 MG: 0.4 CAPSULE ORAL at 08:55

## 2025-04-05 RX ADMIN — BUMETANIDE 3 MG: 1 TABLET ORAL at 08:55

## 2025-04-05 RX ADMIN — METOLAZONE 5 MG: 5 TABLET ORAL at 08:55

## 2025-04-05 RX ADMIN — DONEPEZIL HYDROCHLORIDE 10 MG: 10 TABLET, FILM COATED ORAL at 08:56

## 2025-04-05 ASSESSMENT — PAIN DESCRIPTION - DESCRIPTORS: DESCRIPTORS: DISCOMFORT;ACHING

## 2025-04-05 ASSESSMENT — PAIN SCALES - GENERAL
PAINLEVEL_OUTOF10: 5
PAINLEVEL_OUTOF10: 1

## 2025-04-05 ASSESSMENT — PAIN DESCRIPTION - LOCATION: LOCATION: FOOT

## 2025-04-05 ASSESSMENT — PAIN DESCRIPTION - ORIENTATION: ORIENTATION: RIGHT

## 2025-04-05 NOTE — PROGRESS NOTES
Renal Progress Note    Thank you to requesting provider: Dr Steve, for asking us to see Cholo Bhakta    Reason for consultation:  CKD stage 4.     Chief Complaint:  Dyspnea and fluid overload.     History of Presenting Illness      Cholo Bhakta is a 69 y.o. male for whom we were consulted for evaluation and treatment of  chronic kidney disease stage IV.  He has a history of diabetic nephropathy, hypertension, diabetic foot ulcer, CAD, CHF with frequent episodes of volume overload.  He was recently discharged from this facility on 4/1 with increasing edema and dyspnea. Seen with family initially. Has also been dyspneic and weak. Has no change in his urine habits. He was compliant to his diet and meds.   Patient had good urine output and has been less edematous. Still not tolerating his BiPAP.    Past Medical/Surgical History      Active Ambulatory Problems     Diagnosis Date Noted    CHF, left ventricular (HCC) 02/21/2025    Acute on chronic systolic heart failure (HCC) 02/21/2025    Volume overload 02/21/2025    CKD (chronic kidney disease) stage 4, GFR 15-29 ml/min ([facility]) 02/21/2025    Cardiorenal syndrome 02/21/2025    Chronic obstructive pulmonary disease (HCC) 12/03/2024    Essential hypertension 05/03/2017    Diabetes mellitus ([facility]) 02/22/2025    CAD (coronary artery disease) 01/20/2023    Diabetic ulcer of left foot associated with type 2 diabetes mellitus 08/31/2020    Acute on chronic congestive heart failure, unspecified heart failure type (HCC) 03/06/2025    Gout 03/06/2025    Memory loss 03/06/2025    GERD (gastroesophageal reflux disease) 03/06/2025    BPH (benign prostatic hyperplasia) 03/06/2025    Neuropathy 03/06/2025    Palliative care patient 03/07/2025     Resolved Ambulatory Problems     Diagnosis Date Noted    No Resolved Ambulatory Problems     Past Medical History:   Diagnosis Date    CHF (congestive heart failure) (HCC)     Chronic kidney disease     Hyperlipidemia     Hypertension

## 2025-04-05 NOTE — PLAN OF CARE
Problem: Chronic Conditions and Co-morbidities  Goal: Patient's chronic conditions and co-morbidity symptoms are monitored and maintained or improved  4/5/2025 0049 by Roselyn Herrera RN  Outcome: Progressing  4/4/2025 1650 by Gustabo Gómez RN  Outcome: Progressing     Problem: Pain  Goal: Verbalizes/displays adequate comfort level or baseline comfort level  4/5/2025 0049 by Roselyn Herrera RN  Outcome: Progressing  4/4/2025 1650 by Gustabo Gómez RN  Outcome: Progressing     Problem: Skin/Tissue Integrity  Goal: Skin integrity remains intact  Description: 1.  Monitor for areas of redness and/or skin breakdown  2.  Assess vascular access sites hourly  3.  Every 4-6 hours minimum:  Change oxygen saturation probe site  4.  Every 4-6 hours:  If on nasal continuous positive airway pressure, respiratory therapy assess nares and determine need for appliance change or resting period  4/5/2025 0049 by Roselyn Herrera RN  Outcome: Progressing  4/4/2025 1650 by Gustabo Gómez RN  Outcome: Progressing     Problem: Safety - Adult  Goal: Free from fall injury  4/5/2025 0049 by Roselyn Herrera RN  Outcome: Progressing  4/4/2025 1650 by Gustabo Gómez RN  Outcome: Progressing     Problem: ABCDS Injury Assessment  Goal: Absence of physical injury  Outcome: Progressing

## 2025-04-05 NOTE — DISCHARGE SUMMARY
[facility]    Discharge Summary      Cholo Bhakta  :  1955  MRN:  [id number]    Admit date:  2025  Discharge date:    2025    Discharging Physician:  Dr. Sarbjit Steve     Advance Directive: Full Code    Consults: cardiology and nephrology    Primary Care Physician:  Nirmal Nuno MD    Discharge Diagnoses:  Principal Problem:    Acute on chronic systolic CHF (congestive heart failure) (HCC)  Active Problems:    CKD (chronic kidney disease) stage 4, GFR 15-29 ml/min (HCC)    Essential hypertension    Diabetes mellitus (HCC)    CAD (coronary artery disease)    GERD (gastroesophageal reflux disease)  Resolved Problems:    * No resolved hospital problems. *      Portions of this note have been copied forward, however, changed to reflect the most current clinical status of this patient.    Hospital Course:   The patient is a 69 y.o. male with past medical history of CAD, s/p CABG, CKD 4 followed by Dr. Freed, Type 2 diabetes, chronic diabetic foot wound followed by Dr. Heaton in [place], [place], Afib on Eliquis, NOBLE uses BiPAP, who presented to [facility] ED for evaluation of progressively worsening shortness of breath and feelings of chest discomfort at times. Stated he took 1 nitro at home without relief. Reported progressively worsening shortness of breath for the past week. Reported dysuria and decreased urinary output over the past 2-3 days. Reported abdominal discomfort. Denied fever or chills. Denied nausea or vomiting. Stated he was seen by Dr. Freed in the office day prior to admission and changes were made to his diuretics, however had not been able to pick it up due to his pharmacy not currently having it in stock. Of note, patient was discharged from here on 3/14/25 after being admitted for acute on chronic systolic CHF. Limited ECHO was obtained and noted to have EF of 45 to 50% with mild global hypokinesis. Workup in ED revealed BUN 71, Cr 3.4, eGFR 19, Troponin 122 followed by 126, BNP 8576,  signed by CORI Rosen CNP on 4/5/25 at 12:05 PM CDT         EMR Dragon/Transcription disclaimer:   Much of this encounter note is an electronic transcription/translation of spoken language to printed text. The electronic translation of spoken language may permit erroneous, or at times, nonsensical words or phrases to be inadvertently transcribed; although attempts have made to review the note for such errors, some may still exist.

## 2025-04-05 NOTE — PLAN OF CARE
Problem: Chronic Conditions and Co-morbidities  Goal: Patient's chronic conditions and co-morbidity symptoms are monitored and maintained or improved  Recent Flowsheet Documentation  Taken 4/5/2025 0800 by Landy Dinero RN  Care Plan - Patient's Chronic Conditions and Co-Morbidity Symptoms are Monitored and Maintained or Improved:   Monitor and assess patient's chronic conditions and comorbid symptoms for stability, deterioration, or improvement   Collaborate with multidisciplinary team to address chronic and comorbid conditions and prevent exacerbation or deterioration  4/5/2025 0049 by Roselyn Herrera RN  Outcome: Progressing     Problem: Pain  Goal: Verbalizes/displays adequate comfort level or baseline comfort level  4/5/2025 0049 by Roselyn Herrera RN  Outcome: Progressing     Problem: Skin/Tissue Integrity  Goal: Skin integrity remains intact  Description: 1.  Monitor for areas of redness and/or skin breakdown  2.  Assess vascular access sites hourly  3.  Every 4-6 hours minimum:  Change oxygen saturation probe site  4.  Every 4-6 hours:  If on nasal continuous positive airway pressure, respiratory therapy assess nares and determine need for appliance change or resting period  Recent Flowsheet Documentation  Taken 4/5/2025 0800 by Landy Dinero, RN  Skin Integrity Remains Intact:   Monitor for areas of redness and/or skin breakdown   Assess vascular access sites hourly  4/5/2025 0049 by Roselyn Herrera RN  Outcome: Progressing     Problem: Safety - Adult  Goal: Free from fall injury  4/5/2025 0049 by Roselyn Herrera RN  Outcome: Progressing     Problem: ABCDS Injury Assessment  Goal: Absence of physical injury  4/5/2025 0049 by Roselyn Herrera, RN  Outcome: Progressing

## 2025-04-22 ENCOUNTER — OFFICE VISIT (OUTPATIENT)
Dept: CARDIOLOGY CLINIC | Age: 70
End: 2025-04-22
Payer: MEDICARE

## 2025-04-22 VITALS
HEART RATE: 71 BPM | DIASTOLIC BLOOD PRESSURE: 60 MMHG | OXYGEN SATURATION: 97 % | HEIGHT: 72 IN | WEIGHT: 301 LBS | BODY MASS INDEX: 40.77 KG/M2 | SYSTOLIC BLOOD PRESSURE: 112 MMHG

## 2025-04-22 DIAGNOSIS — I10 ESSENTIAL HYPERTENSION: ICD-10-CM

## 2025-04-22 DIAGNOSIS — E78.5 HYPERLIPIDEMIA, UNSPECIFIED HYPERLIPIDEMIA TYPE: ICD-10-CM

## 2025-04-22 DIAGNOSIS — I48.20 CHRONIC ATRIAL FIBRILLATION (HCC): ICD-10-CM

## 2025-04-22 DIAGNOSIS — R06.02 SOB (SHORTNESS OF BREATH): Primary | ICD-10-CM

## 2025-04-22 DIAGNOSIS — Z79.01 CHRONIC ANTICOAGULATION: ICD-10-CM

## 2025-04-22 DIAGNOSIS — I50.22 CHRONIC SYSTOLIC HEART FAILURE (HCC): ICD-10-CM

## 2025-04-22 DIAGNOSIS — I25.10 CORONARY ARTERY DISEASE INVOLVING NATIVE CORONARY ARTERY OF NATIVE HEART WITHOUT ANGINA PECTORIS: ICD-10-CM

## 2025-04-22 PROCEDURE — 1159F MED LIST DOCD IN RCRD: CPT | Performed by: NURSE PRACTITIONER

## 2025-04-22 PROCEDURE — 3074F SYST BP LT 130 MM HG: CPT | Performed by: NURSE PRACTITIONER

## 2025-04-22 PROCEDURE — 93000 ELECTROCARDIOGRAM COMPLETE: CPT | Performed by: NURSE PRACTITIONER

## 2025-04-22 PROCEDURE — 1123F ACP DISCUSS/DSCN MKR DOCD: CPT | Performed by: NURSE PRACTITIONER

## 2025-04-22 PROCEDURE — 99214 OFFICE O/P EST MOD 30 MIN: CPT | Performed by: NURSE PRACTITIONER

## 2025-04-22 PROCEDURE — 3078F DIAST BP <80 MM HG: CPT | Performed by: NURSE PRACTITIONER

## 2025-04-22 NOTE — PROGRESS NOTES
Disp: , Rfl:     pregabalin (LYRICA) 100 MG capsule, Take 1 capsule by mouth 2 times daily. One capsule every morning and two capsules every evening before bed, Disp: , Rfl:     rosuvastatin (CRESTOR) 10 MG tablet, Take 1 tablet by mouth at bedtime, Disp: , Rfl:     tamsulosin (FLOMAX) 0.4 MG capsule, Take 1 tablet by mouth daily, Disp: , Rfl:     insulin lispro, 1 Unit Dial, (HUMALOG/ADMELOG) 100 UNIT/ML SOPN, Inject 0-100 Units into the skin 3 times daily (before meals) Glucose: Dose:  No Insulin 180-249 2 Units 250-299 4 Units 300-349 6 Units Over 349 8 Units, Disp: 90 mL, Rfl: 0    PE:  Vitals:    04/22/25 1437   BP: 112/60   Pulse: 71   SpO2: 97%       Estimated body mass index is 40.82 kg/m² as calculated from the following:    Height as of this encounter: 1.829 m (6').    Weight as of this encounter: 136.5 kg (301 lb).    Constitutional: He is oriented to person, place, and time. He appears well-developed and well-nourished in no acute distress.  Neck:  Neck supple without JVD present. No trachea deviation present. No thyromegaly present.   Eyes:Conjunctivae and EOM are normal. Pupils equal and reactive to light.   ENT:Hearing appears normal.conjunctiva and lids are normal, ears and nose appear normal.  Cardiovascular: Normal rate, irregular rhythm, normal heart sounds.  1/6 murmur ascultated.  No gallop and no friction rub.  No carotid bruits.  +1 peripheral edema.    Pulmonary/Chest:  Lungs clear to auscultation bilaterally without evidence of respiratory distress.  He without wheezes. He without rales or ronchi.   Musculoskeletal: Normal range of motion.  He is in a wheelchair as an assitive device. Head is normocephalic and atraumatic.  Skin: Skin is warm and dry without rash or pallor.   Psychiatric:He is alert and oriented to person, place, and time.  He has a normal mood and affect. His behavior is normal. Thought content normal.       Lab Results   Component Value Date/Time    CREATININE 3.3

## 2025-04-23 ENCOUNTER — TELEPHONE (OUTPATIENT)
Dept: CARDIOLOGY CLINIC | Age: 70
End: 2025-04-23

## 2025-04-29 ENCOUNTER — CLINICAL DOCUMENTATION (OUTPATIENT)
Dept: CARDIOLOGY CLINIC | Age: 70
End: 2025-04-29

## 2025-04-29 ENCOUNTER — OFFICE VISIT (OUTPATIENT)
Dept: CARDIOLOGY CLINIC | Age: 70
End: 2025-04-29
Payer: MEDICARE

## 2025-04-29 VITALS
HEART RATE: 73 BPM | SYSTOLIC BLOOD PRESSURE: 102 MMHG | OXYGEN SATURATION: 99 % | DIASTOLIC BLOOD PRESSURE: 60 MMHG | HEIGHT: 72 IN | WEIGHT: 302 LBS | BODY MASS INDEX: 40.9 KG/M2

## 2025-04-29 DIAGNOSIS — E78.5 HYPERLIPIDEMIA, UNSPECIFIED HYPERLIPIDEMIA TYPE: ICD-10-CM

## 2025-04-29 DIAGNOSIS — I50.22 CHRONIC SYSTOLIC HEART FAILURE (HCC): ICD-10-CM

## 2025-04-29 DIAGNOSIS — Z79.01 CHRONIC ANTICOAGULATION: ICD-10-CM

## 2025-04-29 DIAGNOSIS — I10 ESSENTIAL HYPERTENSION: ICD-10-CM

## 2025-04-29 DIAGNOSIS — I48.20 CHRONIC ATRIAL FIBRILLATION (HCC): ICD-10-CM

## 2025-04-29 DIAGNOSIS — I25.10 CORONARY ARTERY DISEASE INVOLVING NATIVE CORONARY ARTERY OF NATIVE HEART WITHOUT ANGINA PECTORIS: Primary | ICD-10-CM

## 2025-04-29 PROCEDURE — 3074F SYST BP LT 130 MM HG: CPT | Performed by: NURSE PRACTITIONER

## 2025-04-29 PROCEDURE — 3078F DIAST BP <80 MM HG: CPT | Performed by: NURSE PRACTITIONER

## 2025-04-29 PROCEDURE — 1123F ACP DISCUSS/DSCN MKR DOCD: CPT | Performed by: NURSE PRACTITIONER

## 2025-04-29 PROCEDURE — 99214 OFFICE O/P EST MOD 30 MIN: CPT | Performed by: NURSE PRACTITIONER

## 2025-04-29 PROCEDURE — 1159F MED LIST DOCD IN RCRD: CPT | Performed by: NURSE PRACTITIONER

## 2025-04-29 RX ORDER — BUMETANIDE 2 MG/1
2 TABLET ORAL 2 TIMES DAILY
COMMUNITY

## 2025-04-29 NOTE — PROGRESS NOTES
Left atrium is dilated.     Limited echocardiography for LV function.    Assessment, Recommendations, & Plan:  69 y.o. male with CAD, HTN, HLD, chronic atrial fibrillation, chronic anticoagulation, & chronic systolic heart failure    CAD-episodes of chest pain which are chronic for the past 1 to 2 years on Eliquis, Toprol, Isordil, & Crestor.  Continue to monitor    HTN-controlled today on Bumex, Zaroxolyn, Toprol, & Isordil.  Continue current regimen    HLD-followed by PCP, on Crestor, no changes    Chronic atrial fibrillation/chronic anticoagulation-rate controlled on Toprol.  Anticoagulated on Eliquis without bleeding issues.  Continue current regimen    Chronic systolic heart failure-he has been taking his medications as prescribed.  He has been doing daily weights and logging them.  He continues on metoprolol, Bumex, and Zaroxolyn.  We discussed again the portance of low-sodium diet and weighing daily.  Calcific our CHF nurse will come in and do more CHF education with him    Disposition - RTC in 2 weeks or sooner if needed      Please do not hesitate to contact me for any questions or concerns.    Sincerely yours,    CORI Thrasher

## 2025-04-29 NOTE — PROGRESS NOTES
Nurse met with patient and wife. Discussed with patient diet, activity, medications, definition of heart failure/testing, monitoring of BS and s/s, daily wt monitoring with reporting of wt gain of >2-3 lbs in 24 hours or > 5 lbs in one week, BP/HR and activity monitoring with use of daily log, f/u appointments with PCP, CHF clinic and cardiologist at [facility].  HF packet given with logs for daily recordings. Patient stated they do have a wt scale and BP monitor at home. He has been monitoring his weight daily. Direct phone number to CHF nurse given and encouraged to call with any questions or concerns. Will set up f/u in 2 weeks.

## 2025-05-06 ENCOUNTER — TELEPHONE (OUTPATIENT)
Dept: CARDIOLOGY CLINIC | Age: 70
End: 2025-05-06

## 2025-05-06 NOTE — TELEPHONE ENCOUNTER
I would have him take an extra half of a tablet of Bumex around lunch for the next 2 days and then resume to Bumex 2 mg twice a day as scheduled.  Keep up with daily weights.  He go ahead and get that extra half of a tablet and today.  I had placed orders last week for him to have a BNP and BMP drawn.  He did not have this done.  Please see if they can bring him in to have these drawn

## 2025-05-06 NOTE — TELEPHONE ENCOUNTER
Pt wife called to report that he has had a 14 pound weight gain in 3 days. She states he had a \"little\" shortness of breath this morning and some increase in abdominal swelling. He is taking Bumex 2 mg BID and Metolazone 5 mg daily.

## 2025-05-07 ENCOUNTER — TRANSCRIBE ORDERS (OUTPATIENT)
Dept: ADMINISTRATIVE | Facility: HOSPITAL | Age: 70
End: 2025-05-07
Payer: MEDICARE

## 2025-05-07 DIAGNOSIS — E78.5 HYPERLIPIDEMIA, UNSPECIFIED HYPERLIPIDEMIA TYPE: ICD-10-CM

## 2025-05-07 DIAGNOSIS — E11.42 TYPE 2 DIABETES MELLITUS WITH DIABETIC POLYNEUROPATHY, UNSPECIFIED WHETHER LONG TERM INSULIN USE: Primary | ICD-10-CM

## 2025-05-07 DIAGNOSIS — D50.9 IRON DEFICIENCY ANEMIA, UNSPECIFIED IRON DEFICIENCY ANEMIA TYPE: ICD-10-CM

## 2025-05-07 DIAGNOSIS — F03.90 DEMENTIA WITHOUT BEHAVIORAL DISTURBANCE, PSYCHOTIC DISTURBANCE, MOOD DISTURBANCE, OR ANXIETY, UNSPECIFIED DEMENTIA SEVERITY, UNSPECIFIED DEMENTIA TYPE: ICD-10-CM

## 2025-05-08 DIAGNOSIS — R06.02 SOB (SHORTNESS OF BREATH): ICD-10-CM

## 2025-05-08 LAB
ANION GAP SERPL CALCULATED.3IONS-SCNC: 17 MMOL/L (ref 8–16)
BNP BLD-MCNC: 7661 PG/ML (ref 0–124)
BUN SERPL-MCNC: 108 MG/DL (ref 8–23)
CALCIUM SERPL-MCNC: 9.4 MG/DL (ref 8.8–10.2)
CHLORIDE SERPL-SCNC: 97 MMOL/L (ref 98–107)
CO2 SERPL-SCNC: 27 MMOL/L (ref 22–29)
CREAT SERPL-MCNC: 3.9 MG/DL (ref 0.7–1.2)
GLUCOSE SERPL-MCNC: 76 MG/DL (ref 70–99)
POTASSIUM SERPL-SCNC: 5.1 MMOL/L (ref 3.5–5.1)
SODIUM SERPL-SCNC: 141 MMOL/L (ref 136–145)

## 2025-05-11 ENCOUNTER — RESULTS FOLLOW-UP (OUTPATIENT)
Dept: CARDIOLOGY CLINIC | Age: 70
End: 2025-05-11

## 2025-05-13 ENCOUNTER — OFFICE VISIT (OUTPATIENT)
Dept: CARDIOLOGY CLINIC | Age: 70
End: 2025-05-13
Payer: MEDICARE

## 2025-05-13 VITALS
HEIGHT: 72 IN | OXYGEN SATURATION: 97 % | HEART RATE: 77 BPM | WEIGHT: 311.6 LBS | DIASTOLIC BLOOD PRESSURE: 74 MMHG | SYSTOLIC BLOOD PRESSURE: 136 MMHG | BODY MASS INDEX: 42.2 KG/M2

## 2025-05-13 DIAGNOSIS — I50.22 CHRONIC SYSTOLIC HEART FAILURE (HCC): ICD-10-CM

## 2025-05-13 DIAGNOSIS — I10 ESSENTIAL HYPERTENSION: ICD-10-CM

## 2025-05-13 DIAGNOSIS — E78.5 HYPERLIPIDEMIA, UNSPECIFIED HYPERLIPIDEMIA TYPE: ICD-10-CM

## 2025-05-13 DIAGNOSIS — I25.10 CORONARY ARTERY DISEASE INVOLVING NATIVE CORONARY ARTERY OF NATIVE HEART WITHOUT ANGINA PECTORIS: Primary | ICD-10-CM

## 2025-05-13 DIAGNOSIS — Z79.01 CHRONIC ANTICOAGULATION: ICD-10-CM

## 2025-05-13 DIAGNOSIS — I48.20 CHRONIC ATRIAL FIBRILLATION (HCC): ICD-10-CM

## 2025-05-13 DIAGNOSIS — R06.02 SOB (SHORTNESS OF BREATH): ICD-10-CM

## 2025-05-13 LAB
ANION GAP SERPL CALCULATED.3IONS-SCNC: 18 MMOL/L (ref 8–16)
BNP BLD-MCNC: 7838 PG/ML (ref 0–124)
BUN SERPL-MCNC: 99 MG/DL (ref 8–23)
CALCIUM SERPL-MCNC: 9.8 MG/DL (ref 8.8–10.2)
CHLORIDE SERPL-SCNC: 93 MMOL/L (ref 98–107)
CO2 SERPL-SCNC: 30 MMOL/L (ref 22–29)
CREAT SERPL-MCNC: 3.8 MG/DL (ref 0.7–1.2)
GLUCOSE SERPL-MCNC: 83 MG/DL (ref 70–99)
POTASSIUM SERPL-SCNC: 4.1 MMOL/L (ref 3.5–5.1)
SODIUM SERPL-SCNC: 141 MMOL/L (ref 136–145)

## 2025-05-13 PROCEDURE — 3075F SYST BP GE 130 - 139MM HG: CPT | Performed by: NURSE PRACTITIONER

## 2025-05-13 PROCEDURE — 99214 OFFICE O/P EST MOD 30 MIN: CPT | Performed by: NURSE PRACTITIONER

## 2025-05-13 PROCEDURE — 1159F MED LIST DOCD IN RCRD: CPT | Performed by: NURSE PRACTITIONER

## 2025-05-13 PROCEDURE — 1123F ACP DISCUSS/DSCN MKR DOCD: CPT | Performed by: NURSE PRACTITIONER

## 2025-05-13 PROCEDURE — 3078F DIAST BP <80 MM HG: CPT | Performed by: NURSE PRACTITIONER

## 2025-05-13 RX ORDER — METOPROLOL SUCCINATE 50 MG/1
50 TABLET, EXTENDED RELEASE ORAL DAILY
Qty: 90 TABLET | Refills: 3 | Status: SHIPPED | OUTPATIENT
Start: 2025-05-13

## 2025-05-13 NOTE — TELEPHONE ENCOUNTER
Pt in office today and went over results. Took labs results down to Nephrology office and got patient appt for 3pm today with Dr. Freed.

## 2025-05-13 NOTE — PROGRESS NOTES
(HCC)     Hyperlipidemia     Hypertension     Palliative care patient 03/07/2025       Past Surgical History:   Procedure Laterality Date    CORONARY ARTERY BYPASS GRAFT         No family history on file.    Social History     Socioeconomic History    Marital status:      Spouse name: Not on file    Number of children: Not on file    Years of education: Not on file    Highest education level: Not on file   Occupational History    Not on file   Tobacco Use    Smoking status: Never    Smokeless tobacco: Never   Substance and Sexual Activity    Alcohol use: Not Currently    Drug use: Never    Sexual activity: Not on file   Other Topics Concern    Not on file   Social History Narrative    Not on file     Social Drivers of Health     Financial Resource Strain: Low Risk  (4/2/2025)    Overall Financial Resource Strain (CARDIA)     Difficulty of Paying Living Expenses: Not hard at all   Food Insecurity: No Food Insecurity (4/2/2025)    Hunger Vital Sign     Worried About Running Out of Food in the Last Year: Never true     Ran Out of Food in the Last Year: Never true   Transportation Needs: No Transportation Needs (4/2/2025)    Transportation Problems ([facility])     In the past 12 months, has lack of reliable transportation kept you from medical appointments, meetings, work or from getting things needed for daily living?: No   Physical Activity: Unknown (4/2/2025)    Physical Activity ([facility])     In the last 30 days, other than the activities you did for work, on average, how many days per week did you engage in moderate exercise (like walking fast, running, jogging, dancing, swimming, biking, or other similar activites)?: 0     Number of minutes of exercise per week:: 0   Stress: Not on file   Social Connections: Socially Integrated (4/2/2025)    Social Connections ([facility])     If for any reason you need help with day-to-day activities such as bathing, preparing meals, shopping, managing finances, etc., do you

## 2025-05-14 ENCOUNTER — RESULTS FOLLOW-UP (OUTPATIENT)
Dept: CARDIOLOGY CLINIC | Age: 70
End: 2025-05-14

## 2025-05-14 NOTE — TELEPHONE ENCOUNTER
Spoke with pt wife, she reports they did increase his Bumex to 3mg BID and Metolazone 5 mg daily. He will see them back in 3 months or sooner if needed. He did recommend dialysis, patient refused at this time. He has follow up with you next week and will continue to weigh daily.

## 2025-05-20 ENCOUNTER — OFFICE VISIT (OUTPATIENT)
Dept: CARDIOLOGY CLINIC | Age: 70
End: 2025-05-20
Payer: MEDICARE

## 2025-05-20 VITALS
DIASTOLIC BLOOD PRESSURE: 62 MMHG | OXYGEN SATURATION: 95 % | HEIGHT: 72 IN | WEIGHT: 307.4 LBS | BODY MASS INDEX: 41.63 KG/M2 | SYSTOLIC BLOOD PRESSURE: 120 MMHG | HEART RATE: 67 BPM

## 2025-05-20 DIAGNOSIS — I50.22 CHRONIC SYSTOLIC HEART FAILURE (HCC): ICD-10-CM

## 2025-05-20 DIAGNOSIS — I48.20 CHRONIC ATRIAL FIBRILLATION (HCC): ICD-10-CM

## 2025-05-20 DIAGNOSIS — E78.5 HYPERLIPIDEMIA, UNSPECIFIED HYPERLIPIDEMIA TYPE: ICD-10-CM

## 2025-05-20 DIAGNOSIS — I25.10 CORONARY ARTERY DISEASE INVOLVING NATIVE CORONARY ARTERY OF NATIVE HEART WITHOUT ANGINA PECTORIS: Primary | ICD-10-CM

## 2025-05-20 DIAGNOSIS — I10 ESSENTIAL HYPERTENSION: ICD-10-CM

## 2025-05-20 DIAGNOSIS — Z79.01 CHRONIC ANTICOAGULATION: ICD-10-CM

## 2025-05-20 DIAGNOSIS — R06.02 SOB (SHORTNESS OF BREATH): ICD-10-CM

## 2025-05-20 PROCEDURE — 1159F MED LIST DOCD IN RCRD: CPT | Performed by: NURSE PRACTITIONER

## 2025-05-20 PROCEDURE — 1123F ACP DISCUSS/DSCN MKR DOCD: CPT | Performed by: NURSE PRACTITIONER

## 2025-05-20 PROCEDURE — 99214 OFFICE O/P EST MOD 30 MIN: CPT | Performed by: NURSE PRACTITIONER

## 2025-05-20 PROCEDURE — 3078F DIAST BP <80 MM HG: CPT | Performed by: NURSE PRACTITIONER

## 2025-05-20 PROCEDURE — 3074F SYST BP LT 130 MM HG: CPT | Performed by: NURSE PRACTITIONER

## 2025-05-20 NOTE — PROGRESS NOTES
Dear Dr. Nuno, Nirmal SANCHEZ MD,    Thank you for allowing me to participate in the care of Mr. Cholo Bhakta. He presents today at the [facility]. As you know, Mr. Bhakta is a 69 y.o. male with history of hypertension, hyperlipidemia, diabetes, CKD stage IV, chronic atrial fibrillation, chronic anticoagulation, chronic systolic heart failure, and CAD (CABG x 4, 9/2015) who presents with the chief complaint of CHF follow-up.  He is a patient of Dr. Cohen.  He was seen last week and shortness of breath was worse and weight was up.  Creatinine was also worse.  We had him see Dr. Freed who recommend dialysis.  Patient still declined at that point.  Dr. Freed did okay increasing the Bumex to 3 mg twice a day along with Zaroxolyn.  It looks like he is down 4 pounds today in office. He is down 15 pounds from at home weight from last week. He is still sleeping in a recliner at night but is sleeping better. . he is not waking gasping for air. he does have leg swelling. he has been compliant with his medications. his BP has been controlled. PCP follows labs and lipids.      He otherwise denies syncope or near syncope. He has no other complaints.    Review of Systems   Constitutional: Negative for fever, chills, diaphoresis, activity change, appetite change, fatigue and unexpected weight change.   Eyes: Negative for photophobia, pain, redness and visual disturbance.   Respiratory: Positive for shortness of breath (slightly improved). Negative for apnea, cough, chest tightness, shortness of breath, wheezing and stridor.    Cardiovascular: Positive for chest pain (chronic) and leg swelling (improved). Negative for palpitations.   Gastrointestinal: Negative for abdominal distention.   Genitourinary: Negative for dysuria, urgency and frequency.   Musculoskeletal: Negative for myalgias, arthralgias and gait problem.   Skin: Negative for color change, pallor, rash and wound.   Neurological: Positive for generalized

## 2025-06-02 ENCOUNTER — HOSPITAL ENCOUNTER (INPATIENT)
Age: 70
LOS: 12 days | Discharge: NURSING, REHAB OR LONG-TERM CARE | End: 2025-06-14
Attending: STUDENT IN AN ORGANIZED HEALTH CARE EDUCATION/TRAINING PROGRAM | Admitting: SURGERY
Payer: MEDICARE

## 2025-06-02 ENCOUNTER — APPOINTMENT (OUTPATIENT)
Dept: CT IMAGING | Age: 70
End: 2025-06-02
Payer: MEDICARE

## 2025-06-02 ENCOUNTER — APPOINTMENT (OUTPATIENT)
Dept: GENERAL RADIOLOGY | Age: 70
End: 2025-06-02
Payer: MEDICARE

## 2025-06-02 ENCOUNTER — TELEPHONE (OUTPATIENT)
Age: 70
End: 2025-06-02

## 2025-06-02 DIAGNOSIS — R41.82 ALTERED MENTAL STATUS, UNSPECIFIED ALTERED MENTAL STATUS TYPE: ICD-10-CM

## 2025-06-02 DIAGNOSIS — Z51.5 PALLIATIVE CARE PATIENT: ICD-10-CM

## 2025-06-02 DIAGNOSIS — L89.159 PRESSURE INJURY OF SKIN OF SACRAL REGION, UNSPECIFIED INJURY STAGE: ICD-10-CM

## 2025-06-02 DIAGNOSIS — G62.9 NEUROPATHY: ICD-10-CM

## 2025-06-02 DIAGNOSIS — N18.9 ACUTE KIDNEY INJURY SUPERIMPOSED ON CKD: ICD-10-CM
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
## 2025-06-02 DIAGNOSIS — N17.9 ACUTE KIDNEY INJURY SUPERIMPOSED ON CKD: ICD-10-CM

## 2025-06-02 DIAGNOSIS — S91.302A OPEN WOUND OF LEFT FOOT, INITIAL ENCOUNTER: ICD-10-CM

## 2025-06-02 DIAGNOSIS — E16.2 HYPOGLYCEMIA: ICD-10-CM

## 2025-06-02 LAB
ALBUMIN SERPL-MCNC: 4 G/DL (ref 3.5–5.2)
ALP SERPL-CCNC: 155 U/L (ref 40–129)
ALT SERPL-CCNC: 23 U/L (ref 10–50)
ANION GAP SERPL CALCULATED.3IONS-SCNC: 17 MMOL/L (ref 8–16)
AST SERPL-CCNC: 28 U/L (ref 10–50)
BACTERIA URNS QL MICRO: NEGATIVE /HPF
BASOPHILS # BLD: 0 K/UL (ref 0–0.2)
BASOPHILS NFR BLD: 0.3 % (ref 0–1)
BILIRUB SERPL-MCNC: 0.7 MG/DL (ref 0.2–1.2)
BILIRUB UR QL STRIP: NEGATIVE
BNP BLD-MCNC: 6304 PG/ML (ref 0–124)
BUN SERPL-MCNC: 120 MG/DL (ref 8–23)
CALCIUM SERPL-MCNC: 9.8 MG/DL (ref 8.8–10.2)
CHLORIDE SERPL-SCNC: 87 MMOL/L (ref 98–107)
CHP ED QC CHECK: NORMAL
CHP ED QC CHECK: YES
CK SERPL-CCNC: 85 U/L (ref 39–308)
CLARITY UR: CLEAR
CO2 SERPL-SCNC: 31 MMOL/L (ref 22–29)
COLOR UR: YELLOW
CREAT SERPL-MCNC: 5.3 MG/DL (ref 0.7–1.2)
CRYSTALS URNS MICRO: ABNORMAL /HPF
EOSINOPHIL # BLD: 0 K/UL (ref 0–0.6)
EOSINOPHIL NFR BLD: 0.3 % (ref 0–5)
EPI CELLS #/AREA URNS AUTO: 2 /HPF (ref 0–5)
ERYTHROCYTE [DISTWIDTH] IN BLOOD BY AUTOMATED COUNT: 14.2 % (ref 11.5–14.5)
GLUCOSE BLD-MCNC: 217 MG/DL (ref 70–99)
GLUCOSE BLD-MCNC: 219 MG/DL (ref 70–99)
GLUCOSE BLD-MCNC: 284 MG/DL
GLUCOSE BLD-MCNC: 284 MG/DL (ref 70–99)
GLUCOSE BLD-MCNC: 344 MG/DL (ref 70–99)
GLUCOSE BLD-MCNC: 401 MG/DL (ref 70–99)
GLUCOSE SERPL-MCNC: 366 MG/DL (ref 70–99)
GLUCOSE UR STRIP.AUTO-MCNC: 500 MG/DL
HBV SURFACE AG SERPL QL IA: NORMAL
HCT VFR BLD AUTO: 35.3 % (ref 42–52)
HGB BLD-MCNC: 11.9 G/DL (ref 14–18)
HGB UR STRIP.AUTO-MCNC: NEGATIVE MG/L
HYALINE CASTS #/AREA URNS AUTO: 13 /HPF (ref 0–8)
IMM GRANULOCYTES # BLD: 0.1 K/UL
KETONES UR STRIP.AUTO-MCNC: NEGATIVE MG/DL
LEUKOCYTE ESTERASE UR QL STRIP.AUTO: NEGATIVE
LYMPHOCYTES # BLD: 0.6 K/UL (ref 1.1–4.5)
LYMPHOCYTES NFR BLD: 5 % (ref 20–40)
MCH RBC QN AUTO: 29.2 PG (ref 27–31)
MCHC RBC AUTO-ENTMCNC: 33.7 G/DL (ref 33–37)
MCV RBC AUTO: 86.7 FL (ref 80–94)
MONOCYTES # BLD: 1.3 K/UL (ref 0–0.9)
MONOCYTES NFR BLD: 11.3 % (ref 0–10)
NEUTROPHILS # BLD: 9.5 K/UL (ref 1.5–7.5)
NEUTS SEG NFR BLD: 82.6 % (ref 50–65)
NITRITE UR QL STRIP.AUTO: NEGATIVE
PERFORMED ON: ABNORMAL
PH UR STRIP.AUTO: 5.5 [PH] (ref 5–8)
PLATELET # BLD AUTO: 211 K/UL (ref 130–400)
PMV BLD AUTO: 12.6 FL (ref 9.4–12.4)
POTASSIUM SERPL-SCNC: 3.9 MMOL/L (ref 3.5–5.1)
PROT SERPL-MCNC: 7.6 G/DL (ref 6.4–8.3)
PROT UR STRIP.AUTO-MCNC: 100 MG/DL
RBC # BLD AUTO: 4.07 M/UL (ref 4.7–6.1)
RBC #/AREA URNS AUTO: 1 /HPF (ref 0–4)
SODIUM SERPL-SCNC: 135 MMOL/L (ref 136–145)
SP GR UR STRIP.AUTO: 1.02 (ref 1–1.03)
UROBILINOGEN UR STRIP.AUTO-MCNC: 1 E.U./DL
WBC # BLD AUTO: 11.5 K/UL (ref 4.8–10.8)
WBC #/AREA URNS AUTO: 0 /HPF (ref 0–5)

## 2025-06-02 PROCEDURE — 81001 URINALYSIS AUTO W/SCOPE: CPT

## 2025-06-02 PROCEDURE — 87040 BLOOD CULTURE FOR BACTERIA: CPT

## 2025-06-02 PROCEDURE — 6360000002 HC RX W HCPCS: Performed by: HOSPITALIST

## 2025-06-02 PROCEDURE — 73620 X-RAY EXAM OF FOOT: CPT

## 2025-06-02 PROCEDURE — 71045 X-RAY EXAM CHEST 1 VIEW: CPT

## 2025-06-02 PROCEDURE — 36415 COLL VENOUS BLD VENIPUNCTURE: CPT

## 2025-06-02 PROCEDURE — 73070 X-RAY EXAM OF ELBOW: CPT

## 2025-06-02 PROCEDURE — 2580000003 HC RX 258: Performed by: STUDENT IN AN ORGANIZED HEALTH CARE EDUCATION/TRAINING PROGRAM

## 2025-06-02 PROCEDURE — 87340 HEPATITIS B SURFACE AG IA: CPT

## 2025-06-02 PROCEDURE — 82550 ASSAY OF CK (CPK): CPT

## 2025-06-02 PROCEDURE — 2580000003 HC RX 258: Performed by: HOSPITALIST

## 2025-06-02 PROCEDURE — 85025 COMPLETE CBC W/AUTO DIFF WBC: CPT

## 2025-06-02 PROCEDURE — 1200000000 HC SEMI PRIVATE

## 2025-06-02 PROCEDURE — 97605 NEG PRS WND THER DME<=50SQCM: CPT

## 2025-06-02 PROCEDURE — 82962 GLUCOSE BLOOD TEST: CPT

## 2025-06-02 PROCEDURE — 6370000000 HC RX 637 (ALT 250 FOR IP): Performed by: HOSPITALIST

## 2025-06-02 PROCEDURE — 83880 ASSAY OF NATRIURETIC PEPTIDE: CPT

## 2025-06-02 PROCEDURE — 72192 CT PELVIS W/O DYE: CPT

## 2025-06-02 PROCEDURE — 6370000000 HC RX 637 (ALT 250 FOR IP)

## 2025-06-02 PROCEDURE — 72125 CT NECK SPINE W/O DYE: CPT

## 2025-06-02 PROCEDURE — 70450 CT HEAD/BRAIN W/O DYE: CPT

## 2025-06-02 PROCEDURE — 72131 CT LUMBAR SPINE W/O DYE: CPT

## 2025-06-02 PROCEDURE — 99285 EMERGENCY DEPT VISIT HI MDM: CPT

## 2025-06-02 PROCEDURE — 93005 ELECTROCARDIOGRAM TRACING: CPT | Performed by: STUDENT IN AN ORGANIZED HEALTH CARE EDUCATION/TRAINING PROGRAM

## 2025-06-02 PROCEDURE — 6370000000 HC RX 637 (ALT 250 FOR IP): Performed by: INTERNAL MEDICINE

## 2025-06-02 PROCEDURE — 80053 COMPREHEN METABOLIC PANEL: CPT

## 2025-06-02 PROCEDURE — 2500000003 HC RX 250 WO HCPCS: Performed by: HOSPITALIST

## 2025-06-02 RX ORDER — HEPARIN SODIUM 5000 [USP'U]/ML
5000 INJECTION, SOLUTION INTRAVENOUS; SUBCUTANEOUS EVERY 8 HOURS SCHEDULED
Status: DISCONTINUED | OUTPATIENT
Start: 2025-06-02 | End: 2025-06-11

## 2025-06-02 RX ORDER — GLUCAGON 1 MG/ML
1 KIT INJECTION PRN
Status: DISCONTINUED | OUTPATIENT
Start: 2025-06-02 | End: 2025-06-14 | Stop reason: HOSPADM

## 2025-06-02 RX ORDER — ACETAMINOPHEN 650 MG/1
650 SUPPOSITORY RECTAL EVERY 6 HOURS PRN
Status: DISCONTINUED | OUTPATIENT
Start: 2025-06-02 | End: 2025-06-14 | Stop reason: HOSPADM

## 2025-06-02 RX ORDER — ACETAMINOPHEN 325 MG/1
650 TABLET ORAL EVERY 6 HOURS PRN
Status: DISCONTINUED | OUTPATIENT
Start: 2025-06-02 | End: 2025-06-14 | Stop reason: HOSPADM

## 2025-06-02 RX ORDER — ONDANSETRON 2 MG/ML
4 INJECTION INTRAMUSCULAR; INTRAVENOUS EVERY 6 HOURS PRN
Status: DISCONTINUED | OUTPATIENT
Start: 2025-06-02 | End: 2025-06-14 | Stop reason: HOSPADM

## 2025-06-02 RX ORDER — SODIUM CHLORIDE 9 MG/ML
INJECTION, SOLUTION INTRAVENOUS PRN
Status: DISCONTINUED | OUTPATIENT
Start: 2025-06-02 | End: 2025-06-14 | Stop reason: HOSPADM

## 2025-06-02 RX ORDER — INSULIN LISPRO 100 [IU]/ML
0-4 INJECTION, SOLUTION INTRAVENOUS; SUBCUTANEOUS
Status: DISCONTINUED | OUTPATIENT
Start: 2025-06-02 | End: 2025-06-14 | Stop reason: HOSPADM

## 2025-06-02 RX ORDER — ONDANSETRON 4 MG/1
4 TABLET, ORALLY DISINTEGRATING ORAL EVERY 8 HOURS PRN
Status: DISCONTINUED | OUTPATIENT
Start: 2025-06-02 | End: 2025-06-14 | Stop reason: HOSPADM

## 2025-06-02 RX ORDER — SODIUM CHLORIDE 9 MG/ML
INJECTION, SOLUTION INTRAVENOUS CONTINUOUS
Status: ACTIVE | OUTPATIENT
Start: 2025-06-02 | End: 2025-06-02

## 2025-06-02 RX ORDER — METOPROLOL SUCCINATE 50 MG/1
50 TABLET, EXTENDED RELEASE ORAL DAILY
Status: DISCONTINUED | OUTPATIENT
Start: 2025-06-02 | End: 2025-06-06

## 2025-06-02 RX ORDER — SODIUM CHLORIDE 0.9 % (FLUSH) 0.9 %
10 SYRINGE (ML) INJECTION PRN
Status: DISCONTINUED | OUTPATIENT
Start: 2025-06-02 | End: 2025-06-14 | Stop reason: HOSPADM

## 2025-06-02 RX ORDER — SODIUM CHLORIDE 0.9 % (FLUSH) 0.9 %
5-40 SYRINGE (ML) INJECTION EVERY 12 HOURS SCHEDULED
Status: DISCONTINUED | OUTPATIENT
Start: 2025-06-02 | End: 2025-06-06

## 2025-06-02 RX ORDER — 0.9 % SODIUM CHLORIDE 0.9 %
500 INTRAVENOUS SOLUTION INTRAVENOUS ONCE
Status: COMPLETED | OUTPATIENT
Start: 2025-06-02 | End: 2025-06-02

## 2025-06-02 RX ORDER — DEXTROSE MONOHYDRATE 100 MG/ML
INJECTION, SOLUTION INTRAVENOUS CONTINUOUS PRN
Status: DISCONTINUED | OUTPATIENT
Start: 2025-06-02 | End: 2025-06-14 | Stop reason: HOSPADM

## 2025-06-02 RX ORDER — INSULIN GLARGINE 100 [IU]/ML
10 INJECTION, SOLUTION SUBCUTANEOUS 2 TIMES DAILY
Status: DISCONTINUED | OUTPATIENT
Start: 2025-06-02 | End: 2025-06-14 | Stop reason: HOSPADM

## 2025-06-02 RX ORDER — INSULIN LISPRO 100 [IU]/ML
5 INJECTION, SOLUTION INTRAVENOUS; SUBCUTANEOUS
Status: DISCONTINUED | OUTPATIENT
Start: 2025-06-02 | End: 2025-06-03

## 2025-06-02 RX ADMIN — SODIUM CHLORIDE 500 ML: 0.9 INJECTION, SOLUTION INTRAVENOUS at 05:29

## 2025-06-02 RX ADMIN — INSULIN LISPRO 4 UNITS: 100 INJECTION, SOLUTION INTRAVENOUS; SUBCUTANEOUS at 07:36

## 2025-06-02 RX ADMIN — HEPARIN SODIUM 5000 UNITS: 5000 INJECTION INTRAVENOUS; SUBCUTANEOUS at 15:41

## 2025-06-02 RX ADMIN — INSULIN LISPRO 2 UNITS: 100 INJECTION, SOLUTION INTRAVENOUS; SUBCUTANEOUS at 12:07

## 2025-06-02 RX ADMIN — METOPROLOL SUCCINATE 50 MG: 25 TABLET, FILM COATED, EXTENDED RELEASE ORAL at 08:26

## 2025-06-02 RX ADMIN — INSULIN GLARGINE 10 UNITS: 100 INJECTION, SOLUTION SUBCUTANEOUS at 08:18

## 2025-06-02 RX ADMIN — ACETAMINOPHEN 650 MG: 325 TABLET ORAL at 08:26

## 2025-06-02 RX ADMIN — SODIUM CHLORIDE, PRESERVATIVE FREE 10 ML: 5 INJECTION INTRAVENOUS at 23:00

## 2025-06-02 RX ADMIN — SODIUM CHLORIDE: 9 INJECTION, SOLUTION INTRAVENOUS at 07:49

## 2025-06-02 RX ADMIN — HEPARIN SODIUM 5000 UNITS: 5000 INJECTION INTRAVENOUS; SUBCUTANEOUS at 21:30

## 2025-06-02 RX ADMIN — HEPARIN SODIUM 5000 UNITS: 5000 INJECTION INTRAVENOUS; SUBCUTANEOUS at 06:48

## 2025-06-02 ASSESSMENT — PAIN - FUNCTIONAL ASSESSMENT
PAIN_FUNCTIONAL_ASSESSMENT: NONE - DENIES PAIN
PAIN_FUNCTIONAL_ASSESSMENT: NONE - DENIES PAIN

## 2025-06-02 NOTE — ED NOTES
Dr. Spicer, Nephrology, and asked that we ask patient's wife regarding consent to proceed with dialysis.  I spoke with Mrs. Bhakta and she agreed to proceed with dialysis.  Dr. Spicer is aware

## 2025-06-02 NOTE — PROGRESS NOTES
[facility]  Nurse  Consult Note       NAME:  Cholo Bhakta  MEDICAL RECORD NUMBER:  [id number]  AGE: 69 y.o.   GENDER: male  : 1955  TODAY'S DATE:  2025    Subjective   Reason for Wound Nurse Evaluation and Assessment: diabetic foot ulcers left foot, irritant contact dermatitis bilateral buttocks      Cholo Bhakta is a 69 y.o. male referred by:   [x] Physician  [] Nursing  [] Other:     Wound Identification:  Wound Type: diabetic and ICD  Contributing Factors: diabetes, poor glucose control, and moisture    Wound History: Wound care has been consulted for chronic diabetic wounds to the patients left foot. The wounds are pink/red. No granulation tissue noted. No odor or slough noted. Veraflo applied to patients to promote moisture and granulation tissue formation. He is currently followed outpatient by Dr. Heaton at [facility]    The patient has moisture associated skin damage to the bilateral inner buttocks. The jeevan wound skin is blanchable at this time      Current Wound Care Treatment:      LEFT FOOT: Veraflo wound vac dressing in place. Low, intermittent suction @ 125mmHg. 8ml soak for 5 minutes every 2 hours. Dressing changes due on MWF.     If the dressing is inactive for more than 2 hours the entire dressing must be replaced. If unable to replace or troubleshoot, place a saline moist to dry dressing changed twice daily.       BILATERAL BUTTOCKS: Cleanse with Ph balanced cleanser with soiling. Apply Cavilon Advanced every other day x2 - next application due on 25. Then apply every 5 days following.     Patient Goal of Care:  [x] Wound Healing  [] Odor Control  [] Palliative Care  [] Pain Control   [] Other:         PAST MEDICAL HISTORY        Diagnosis Date    CHF (congestive heart failure) (HCC)     Chronic kidney disease     Diabetes mellitus (HCC)     Hyperlipidemia     Hypertension     Palliative care patient 2025       PAST SURGICAL HISTORY    Past Surgical History:   Procedure Laterality

## 2025-06-02 NOTE — PROGRESS NOTES
[facility]      Patient:  Cholo Bhakta  YOB: 1955  Date of Service: 6/2/2025  MRN: [id number]   Acct: [id number]   Primary Care Physician: Nirmal Nuno MD  Advance Directive: Full Code  Admit Date: 6/2/2025       Hospital Day: 0  Portions of this note have been copied forward, however, changed to reflect the most current clinical status of this patient    CHIEF COMPLAINT fall    SUBJECTIVE: somnolence, arousal to speech    Cumulative hospital course   69-year-old male with HTN, hyperlipidemia, type II DM, CKD 4, chronic AF on anticoagulation, chronic systolic heart failure, CAD s/p CABG 2015, with complaints of fall.  Patient unable to provide HPI obtained earlier from spouse at bedside.  Spouse reported that patient not been feeling well ongoing for the past week.  Does have chronic wounds to sacral area and left that he follows with outpatient wound care in [place].  Has been following closely with cardiology recently seen on 5/20 and recently increased Bumex to 3 mg twice daily along with Zaroxolyn after complaints of increased weight gain and dyspnea.  Have discussed with nephrology past regarding dialysis and at that time he had declined.  Workup in ER CT head no acute intracranial abnormality, CT cervical/lumbar spine no acute osseous abnormality, CT pelvis no acute findings, CXR no acute cardiopulmonary process, creatinine 5.3 .  Patient admitted to hospitalist service with consultation to nephrology and podiatry.  Chronic appearing left foot wound along with coccyx wound we will consult wound care and monitor off antibiotics.      Objective:   VITALS:  BP (!) 97/45   Pulse 57   Temp 97.7 °F (36.5 °C) (Oral)   Resp 12   Ht 1.829 m (6' 0.01\")   Wt (!) 139.3 kg (307 lb)   SpO2 92%   BMI 41.63 kg/m²   24HR INTAKE/OUTPUT:  No intake or output data in the 24 hours ending 06/02/25 1215    Physical Exam  Vitals and nursing note reviewed.   Constitutional:       Appearance:

## 2025-06-02 NOTE — PROGRESS NOTES
4 Eyes Skin Assessment     NAME:  Cholo Bhakta  YOB: 1955  MEDICAL RECORD NUMBER:  [id number]    The patient is being assessed for  Admission    I agree that at least one RN has performed a thorough Head to Toe Skin Assessment on the patient. ALL assessment sites listed below have been assessed.      Areas assessed by both nurses:    Head, Face, Ears, Shoulders, Back, Chest, Arms, Elbows, Hands, Sacrum. Buttock, Coccyx, Ischium, Legs. Feet and Heels, and Under Medical Devices         Does the Patient have a Wound? Yes wound(s) were present on assessment. LDA wound assessment was Initiated and completed by RN       George Prevention initiated by RN: Yes  Wound Care Orders initiated by RN: Yes    Pressure Injury (Stage 3,4, Unstageable, DTI, NWPT, and Complex wounds) if present, place Wound referral order by RN under : Yes    New Ostomies, if present place, Ostomy referral order under : Yes     Nurse 1 eSignature: Electronically signed by Evie Painter RN on 6/2/25 at 6:50 PM CDT    **SHARE this note so that the co-signing nurse can place an eSignature**    Nurse 2 eSignature: Electronically signed by Regla Stone RN on 6/3/25 at 6:12 AM CDT

## 2025-06-02 NOTE — ED PROVIDER NOTES
components:    POC Glucose 344 (*)     All other components within normal limits   CK   BRAIN NATRIURETIC PEPTIDE       All other labs were within normal range or not returned as of this dictation.    EMERGENCY DEPARTMENT COURSE and DIFFERENTIAL DIAGNOSIS/MDM:   Vitals:    Vitals:    06/02/25 0400 06/02/25 0406 06/02/25 0434 06/02/25 0529   BP:   (!) 102/58 (!) 111/58   Pulse:   98 74   Resp: 16  18 16   Temp:       TempSrc:       SpO2:   95% 94%   Weight:  (!) 139.3 kg (307 lb) (!) 139.3 kg (307 lb)    Height:   1.829 m (6')        MDM      Reassessment    Patient is a 69 y.o. male presenting with fall, generalized weakness for about a week.    Patient is noted to have chronic wounds on the left foot as well as a sacral ulcer.  He is noted to be disheveled and unkempt upon arrival soiled with urine.  He is mildly disoriented.    Labs are notable for SANGEETA on CKD with creatinine of 5.3 up from 3.8 2 weeks ago.  Labs are also notable for hyperglycemia although he was initially hypoglycemic for EMS.    Trauma imaging obtained for fall including CT head, CT C-spine, CT lumbar spine, CT pelvis.  Also obtain chest x-ray, x-ray left foot, and x-ray left elbow.  Imaging was all unremarkable.    Patient given 500 cc IV fluid bolus for SANGEETA.  Will admit to hospitalist for SANGEETA on CKD.          CONSULTS:  None    :  Unless otherwise noted below, none     Procedures    FINAL IMPRESSION      1. Fall, initial encounter    2. Open wound of left foot, initial encounter    3. Acute kidney injury superimposed on CKD    4. Altered mental status, unspecified altered mental status type    5. Pressure injury of skin of sacral region, unspecified injury stage    6. Hypoglycemia          DISPOSITION/PLAN   DISPOSITION Decision To Admit 06/02/2025 05:21:40 AM   DISPOSITION CONDITION Stable           PATIENT REFERRED TO:  No follow-up provider specified.    DISCHARGE MEDICATIONS:  New Prescriptions    No medications on file          (Please  note that portions of this note were completed with a voice recognition program.  Efforts were made to edit thedictations but occasionally words are mis-transcribed.)    Sarah Beth Adam MD (electronically signed)Emergency Physician          Sarah Beth Adam MD  06/02/25 0545

## 2025-06-02 NOTE — H&P
pantoprazole (PROTONIX) 40 MG tablet Take 1 tablet by mouth daily 1/3/25   Padmini Benavidez MD   potassium chloride (K-TAB) 20 MEQ TBCR extended release tablet Take 1 tablet by mouth daily    Padmini Benavidez MD   pregabalin (LYRICA) 100 MG capsule Take 1 capsule by mouth 2 times daily. One capsule every morning and two capsules every evening before bed 1/19/25   Padmini Benavidez MD   rosuvastatin (CRESTOR) 10 MG tablet Take 1 tablet by mouth at bedtime 1/3/25   Padmini Benavidez MD   tamsulosin (FLOMAX) 0.4 MG capsule Take 1 tablet by mouth daily 10/29/24   Padmini Benavidez MD       Allergies:    Cefepime, Bactrim [sulfamethoxazole-trimethoprim], and Metronidazole    Social History:    The patient currently lives at home wit wife  Tobacco:   reports that he has never smoked. He has never used smokeless tobacco.  Alcohol:   reports that he does not currently use alcohol.  Illicit Drugs: Never    Family History:  History reviewed. No pertinent family history.    Review of Systems: unable to obtain 2/2 patients mental status    Physical Examination:        /76   Pulse 71   Temp 97.9 °F (36.6 °C) (Temporal)   Resp 20   Ht 1.829 m (6')   Wt (!) 139.3 kg (307 lb)   SpO2 94%   BMI 41.64 kg/m²   General appearance: chronically ill appearing, obese, lethargic  HEENT: Normal cephalic, atraumatic without obvious deformity. Pupils equal, round, and reactive to light.  Extra ocular muscles intact. Conjunctivae/corneas clear.  Normal ears and nose.   Neck: Supple, with full range of motion. No jugular venous distention. Trachea midline. Thyroid no masses noted.  Respiratory: Normal respiratory effort. Clear to auscultation bilaterally, without Rales/Wheezes/Rhonchi.  Cardiovascular: Regular rate and rhythm with normal S1/S2 without murmurs, rubs or gallops.   Abdomen: Soft, non-tender, non-distended with normal bowel sounds.   Musculoskeletal: No clubbing, cyanosis or edema bilaterally.   02/21/2025    Chronic obstructive pulmonary disease (HCC) [J44.9] 12/03/2024    CAD (coronary artery disease) [I25.10] 01/20/2023    Diabetic ulcer of left foot associated with type 2 diabetes mellitus (HCC) [E11.621, L97.529] 08/31/2020    Essential hypertension [I10] 05/03/2017       Assessment and Plan:  Principal Problem:    SANGEETA (acute kidney injury)  Active Problems:    CKD (chronic kidney disease) stage 4, GFR 15-29 ml/min (HCC)    Chronic obstructive pulmonary disease (HCC)    Essential hypertension    Diabetes mellitus (HCC)    CAD (coronary artery disease)    Diabetic ulcer of left foot associated with type 2 diabetes mellitus (HCC)    Memory loss    GERD (gastroesophageal reflux disease)    BPH (benign prostatic hyperplasia)  Chronic systolic HF  Morbid Obesity  Left diabetic foot ulcer (POA)  Sacral decubitus ulcer (POA)  Resolved Problems:    * No resolved hospital problems. *    - gentle hydration, renal consult, trend labs, avoid nephrotoxic meds  - bedrest, NPO, aspiration/fall precautions  - podiatry consult, obtain blood cultures, hold off on abx for now  - wound consult  - SSI, check A1c  - supportive care  - PT/OT when able  - cont home meds as able      DVT prophylaxis with  Heparin          Luiz Muñoz MD  Hospitalist service  6/2/2025  7:03 AM

## 2025-06-02 NOTE — CARE COORDINATION
SW attempted to speak with pt at bedside regarding d/c plan. Pt's sister at bedside requested SW come back when pt's wife returns.     Pt resting comfortably at this time.

## 2025-06-02 NOTE — ED NOTES
Pt moved onto hospital bed x4 staff assist and repositioned. Slide board utilized. Pt tolerated well.

## 2025-06-02 NOTE — ED NOTES
Javid QUACH, currently ER holding RN, given report and notified that dot phrase is in when room is ready.

## 2025-06-02 NOTE — ED NOTES
Spoke with Ramu PERSAUD regarding pt's BP. No new orders at this time. Requests call back if pt's BP continues to decline. Reports pt is in need of dialysis.   Pt's wife updated over the phone. Pt's sister at bedside updated with plan of care.

## 2025-06-02 NOTE — ED NOTES
Pt repositioned to right side and left foot elevated off of bed at this time. Pt c/o neck/back pain given PRN Tylenol. Pt more alert at this time but does fall back asleep quickly after having conversation.

## 2025-06-02 NOTE — TELEPHONE ENCOUNTER
Nurse Evie from the 3rd floor at [facility] called for this Pt for a consult. Followed up with Dr. Glover about this Pt and he states that Verito will go and see Pt inpatient tomorrow 06/03/25.

## 2025-06-02 NOTE — PROGRESS NOTES
Comprehensive Nutrition Assessment    Type and Reason for Visit:  Initial, Positive nutrition screen    Nutrition Recommendations/Plan:   Follow for advancing diet     Malnutrition Assessment:  Malnutrition Status:  At risk for malnutrition (06/02/25 0710)    Context:  Acute Illness     Findings of the 6 clinical characteristics of malnutrition:  Energy Intake:  50% or less of estimated energy requirements for 5 or more days  Weight Loss:  No weight loss     Body Fat Loss:  No body fat loss     Muscle Mass Loss:  No muscle mass loss    Fluid Accumulation:  No fluid accumulation     Strength:  Not Performed    Nutrition Assessment:    +NS for wound.  Patient is NPO at this time.  Aware of decreased po intake and diarrhea for the past week.    Nutrition Related Findings:    no edema noted Wound Type: Stage II       Current Nutrition Intake & Therapies:    Average Meal Intake: NPO  Average Supplements Intake: NPO  Diet NPO    Anthropometric Measures:  Height: 182.9 cm (6' 0.01\")  Ideal Body Weight (IBW): 178 lbs (81 kg)    Admission Body Weight: 139.3 kg (307 lb 1.6 oz)  Current Body Weight: 139.3 kg (307 lb 1.6 oz), 172.5 % IBW. Weight Source: Other (estimated)  Current BMI (kg/m2): 41.6  Usual Body Weight: 117 kg (257 lb 15 oz) (1/6/2025)  % Weight Change (Calculated): 19.1  Weight Adjustment For: No Adjustment  BMI Categories: Obese Class 3 (BMI 40.0 or greater)    Estimated Daily Nutrient Needs:  Energy Requirements Based On: Kcal/kg  Weight Used for Energy Requirements: Current  Energy (kcal/day): 7609-3857 kcals (8-15 kcals/kg)  Weight Used for Protein Requirements: Usual  Protein (g/day): 162g  Method Used for Fluid Requirements: 1 ml/kcal  Fluid (ml/day): 7146-8149 ml    Nutrition Diagnosis:   Inadequate oral intake related to acute injury/trauma, decreased appetite, increase demand for energy/nutrients as evidenced by NPO or clear liquid status due to medical condition, diarrhea, poor intake prior to

## 2025-06-02 NOTE — ED NOTES
Patient placed on cardiac monitor, continuous pulse oximeter, and NIBP monitor. Monitor alarms on.

## 2025-06-02 NOTE — ED NOTES
ED TO INPATIENT SBAR HANDOFF    Patient Name: Cholo Bhakta   : 1955  69 y.o.   Family/Caregiver Present: Yes, wife and sister  Code Status Order: Full Code    C-SSRS: Risk of Suicide: No Risk  Sitter No  Restraints:         Situation  Chief Complaint:   Chief Complaint   Patient presents with    Fall     Patient arrive from home.  Per EMS patient had an unwitnessed fall.  Patient wife contacted EMS.  EMS states patient initial glucose was 61.  EMS gave glucagon          Patient Diagnosis: SANGEETA (acute kidney injury) [N17.9]     Brief Description of Patient's Condition: pt arrived for generalized weakness and unwitnessed fall with c/o mid to lower back pain. Pt has been drowsy but awakens to voice and is oriented x4 and following commands. Pt has documented buttocks wound and left foot wound that now has woundvac placed by wound care nurse. Admit for SANGEETA. Decreased UOP x couple days per family. BUN- 120, CR- 5.3. Wife came back to ER and has given consent for pt to get dialysis. ER HUC has notified Dr Perez. Pt is not able to get up out of bed or ambulate at this time. BP's have been 90s systolic and admitting team is aware. NS running @100mL/hr.     Mental Status: oriented, thought processes intact, able to concentrate and follow conversation, and pt is drowsy/lethargic but awakens to voice and is oriented x4 and following commands.   Arrived from: home    Imaging:   XR CHEST PORTABLE   Final Result   Impression:       No acute cardiopulmonary disease           ______________________________________    Electronically signed by: MICHAEL SMITH M.D.   Date:     2025   Time:    06:13       XR ELBOW LEFT (2 VIEWS)   Final Result   1.  No fracture or dislocation.               ______________________________________    Electronically signed by: CAROLINA CRAWFORD D.O.   Date:     2025   Time:    06:24       XR FOOT LEFT (2 VIEWS)   Final Result      CT PELVIS WO CONTRAST Additional Contrast? None   Final

## 2025-06-02 NOTE — CONSULTS
Renal Consult Note    Thank you to requesting provider: Dr Sanders, for asking us to see Choloharesh Bhakta    Reason for consultation:  CKD, SANGEETA    Chief Complaint:  s/p fall    History of Presenting Illness      Patient is a 69-year-old man with a past medical history of hypertension, morbid obesity, type 2 diabetes, CAD, CVA, CHF with frequent admissions for CHF exacerbations and a chronic kidney disease stage IV.  Patient was recently having borderline kidney function and offered dialysis but has declined.  On May 8, his BUN was 108 with creatinine 3.9.  Patient currently presents to the ED after he sustained a fall at home with head trauma.  He has been lethargic in the ED.  Sister was sitting at bedside who provided most history.  He has a chronic sacral wound as well as wounds over his lower extremities.  Patient has not been very ambulatory.  On presentation, his BUN was 120 with creatinine 5.3.  His GFR was 11.  No further history could be obtained.  His diuretics were recently increased to control his fluid overload.    Past Medical/Surgical History      Active Ambulatory Problems     Diagnosis Date Noted    CHF, left ventricular (HCC) 02/21/2025    Acute on chronic systolic heart failure (HCC) 02/21/2025    Volume overload 02/21/2025    CKD (chronic kidney disease) stage 4, GFR 15-29 ml/min ([facility]) 02/21/2025    Cardiorenal syndrome 02/21/2025    Chronic obstructive pulmonary disease ([facility]) 12/03/2024    Essential hypertension 05/03/2017    Diabetes mellitus ([facility]) 02/22/2025    CAD (coronary artery disease) 01/20/2023    Diabetic ulcer of left foot associated with type 2 diabetes mellitus ([facility]) 08/31/2020    Acute on chronic congestive heart failure, unspecified heart failure type ([facility]) 03/06/2025    Gout 03/06/2025    Memory loss 03/06/2025    GERD (gastroesophageal reflux disease) 03/06/2025    BPH (benign prostatic hyperplasia) 03/06/2025    Neuropathy 03/06/2025    Palliative care patient 03/07/2025    Acute  Daily AC & HS, Luiz Muñoz MD, 2 Units at 06/02/25 1207    metoprolol succinate (TOPROL XL) extended release tablet 50 mg, 50 mg, Oral, Daily, Ramu Esposito, APRN - CNP, 50 mg at 06/02/25 0826    insulin glargine (LANTUS) injection vial 10 Units, 10 Units, SubCUTAneous, BID, Sudhakar Sanders DO, 10 Units at 06/02/25 0818    insulin lispro (HUMALOG,ADMELOG) injection vial 5 Units, 5 Units, SubCUTAneous, TID WC, Ramu Esposito, APRN - CNP    Current Outpatient Medications:     metoprolol succinate (TOPROL XL) 50 MG extended release tablet, Take 1 tablet by mouth daily, Disp: 90 tablet, Rfl: 3    bumetanide (BUMEX) 2 MG tablet, Take 1.5 tablets by mouth 2 times daily, Disp: , Rfl:     metOLazone (ZAROXOLYN) 5 MG tablet, Take 1 tablet by mouth Every Monday, Wednesday, and Friday (Patient taking differently: Take 1 tablet by mouth daily), Disp: 15 tablet, Rfl: 0    Ergocalciferol (VITAMIN D) 58523 units CAPS, Take 50,000 Units by mouth once a week, Disp: 5 capsule, Rfl: 2    insulin glargine (LANTUS SOLOSTAR) 100 UNIT/ML injection pen, Inject 20 Units into the skin nightly Every evening for 90 days starting 3/5/25, Disp: 6 mL, Rfl: 1    insulin lispro, 1 Unit Dial, (HUMALOG/ADMELOG) 100 UNIT/ML SOPN, Inject 0-100 Units into the skin 3 times daily (before meals) Glucose: Dose:  No Insulin 180-249 2 Units 250-299 4 Units 300-349 6 Units Over 349 8 Units, Disp: 90 mL, Rfl: 0    sodium hypochlorite (DAKINS) 0.125 % SOLN external solution, Apply 1 Application topically 2 times daily, Disp: , Rfl:     docusate sodium (COLACE) 100 MG capsule, Take 1 capsule by mouth 2 times daily Daily for 90 days starting 2/22/25, Disp: , Rfl:     mometasone (NASONEX) 50 MCG/ACT nasal spray, 2 sprays by Nasal route daily, Disp: , Rfl:     ELIQUIS 5 MG TABS tablet, Take 1 tablet by mouth 2 times daily, Disp: , Rfl:     albuterol sulfate HFA (PROVENTIL;VENTOLIN;PROAIR) 108 (90 Base) MCG/ACT inhaler, Inhale 2 puffs into the

## 2025-06-02 NOTE — ED NOTES
Pt's wife present at bedside. This RN attempted to explain the severity of pt's condition and that Dr Perez had been down to bedside to discuss need for dialysis. Pt's wife does not appear to understand severity at this time. ER HUC to attempt to contact Dr Perez.

## 2025-06-03 PROBLEM — E43 SEVERE MALNUTRITION: Status: ACTIVE | Noted: 2025-06-03
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
## 2025-06-03 LAB
ALBUMIN SERPL-MCNC: 3.4 G/DL (ref 3.5–5.2)
ALP SERPL-CCNC: 137 U/L (ref 40–129)
ALT SERPL-CCNC: 18 U/L (ref 10–50)
ANION GAP SERPL CALCULATED.3IONS-SCNC: 17 MMOL/L (ref 8–16)
AST SERPL-CCNC: 32 U/L (ref 10–50)
BASOPHILS # BLD: 0 K/UL (ref 0–0.2)
BASOPHILS NFR BLD: 0.6 % (ref 0–1)
BILIRUB SERPL-MCNC: 0.6 MG/DL (ref 0.2–1.2)
BUN SERPL-MCNC: 115 MG/DL (ref 8–23)
CALCIUM SERPL-MCNC: 9.1 MG/DL (ref 8.8–10.2)
CHLORIDE SERPL-SCNC: 93 MMOL/L (ref 98–107)
CK SERPL-CCNC: 93 U/L (ref 39–308)
CO2 SERPL-SCNC: 28 MMOL/L (ref 22–29)
CREAT SERPL-MCNC: 4.7 MG/DL (ref 0.7–1.2)
EKG P AXIS: NORMAL DEGREES
EKG P-R INTERVAL: NORMAL MS
EKG Q-T INTERVAL: 410 MS
EKG QRS DURATION: 138 MS
EKG QTC CALCULATION (BAZETT): 427 MS
EKG T AXIS: 45 DEGREES
EOSINOPHIL # BLD: 0.2 K/UL (ref 0–0.6)
EOSINOPHIL NFR BLD: 3 % (ref 0–5)
ERYTHROCYTE [DISTWIDTH] IN BLOOD BY AUTOMATED COUNT: 14.2 % (ref 11.5–14.5)
GLUCOSE BLD-MCNC: 167 MG/DL (ref 70–99)
GLUCOSE BLD-MCNC: 196 MG/DL (ref 70–99)
GLUCOSE BLD-MCNC: 204 MG/DL (ref 70–99)
GLUCOSE BLD-MCNC: 205 MG/DL (ref 70–99)
GLUCOSE BLD-MCNC: 232 MG/DL (ref 70–99)
GLUCOSE SERPL-MCNC: 202 MG/DL (ref 70–99)
HBA1C MFR BLD: 10.5 % (ref 4–5.6)
HBV SURFACE AB SERPL IA-ACNC: <3.5 M[IU]/ML
HCT VFR BLD AUTO: 35.5 % (ref 42–52)
HGB BLD-MCNC: 11.6 G/DL (ref 14–18)
IMM GRANULOCYTES # BLD: 0 K/UL
LYMPHOCYTES # BLD: 1.3 K/UL (ref 1.1–4.5)
LYMPHOCYTES NFR BLD: 19.7 % (ref 20–40)
MAGNESIUM SERPL-MCNC: 3.4 MG/DL (ref 1.6–2.4)
MCH RBC QN AUTO: 28.9 PG (ref 27–31)
MCHC RBC AUTO-ENTMCNC: 32.7 G/DL (ref 33–37)
MCV RBC AUTO: 88.5 FL (ref 80–94)
MONOCYTES # BLD: 0.9 K/UL (ref 0–0.9)
MONOCYTES NFR BLD: 13.8 % (ref 0–10)
NEUTROPHILS # BLD: 4 K/UL (ref 1.5–7.5)
NEUTS SEG NFR BLD: 62.6 % (ref 50–65)
PERFORMED ON: ABNORMAL
PLATELET # BLD AUTO: 196 K/UL (ref 130–400)
PMV BLD AUTO: 12.2 FL (ref 9.4–12.4)
POTASSIUM SERPL-SCNC: 3.5 MMOL/L (ref 3.5–5)
PROT SERPL-MCNC: 7.2 G/DL (ref 6.4–8.3)
PTH-INTACT SERPL-MCNC: 69.4 PG/ML (ref 15–65)
RBC # BLD AUTO: 4.01 M/UL (ref 4.7–6.1)
SODIUM SERPL-SCNC: 138 MMOL/L (ref 136–145)
TROPONIN, HIGH SENSITIVITY: 137 NG/L (ref 0–22)
TROPONIN, HIGH SENSITIVITY: 144 NG/L (ref 0–22)
URATE SERPL-MCNC: 13.3 MG/DL (ref 3.4–7)
WBC # BLD AUTO: 6.4 K/UL (ref 4.8–10.8)

## 2025-06-03 PROCEDURE — 83735 ASSAY OF MAGNESIUM: CPT

## 2025-06-03 PROCEDURE — 84484 ASSAY OF TROPONIN QUANT: CPT

## 2025-06-03 PROCEDURE — 82962 GLUCOSE BLOOD TEST: CPT

## 2025-06-03 PROCEDURE — 2500000003 HC RX 250 WO HCPCS: Performed by: HOSPITALIST

## 2025-06-03 PROCEDURE — 6370000000 HC RX 637 (ALT 250 FOR IP): Performed by: HOSPITALIST

## 2025-06-03 PROCEDURE — 99222 1ST HOSP IP/OBS MODERATE 55: CPT | Performed by: INTERNAL MEDICINE

## 2025-06-03 PROCEDURE — 1200000000 HC SEMI PRIVATE

## 2025-06-03 PROCEDURE — 6370000000 HC RX 637 (ALT 250 FOR IP): Performed by: INTERNAL MEDICINE

## 2025-06-03 PROCEDURE — 80053 COMPREHEN METABOLIC PANEL: CPT

## 2025-06-03 PROCEDURE — 6370000000 HC RX 637 (ALT 250 FOR IP)

## 2025-06-03 PROCEDURE — 99222 1ST HOSP IP/OBS MODERATE 55: CPT

## 2025-06-03 PROCEDURE — 84550 ASSAY OF BLOOD/URIC ACID: CPT

## 2025-06-03 PROCEDURE — 36415 COLL VENOUS BLD VENIPUNCTURE: CPT

## 2025-06-03 PROCEDURE — 85025 COMPLETE CBC W/AUTO DIFF WBC: CPT

## 2025-06-03 PROCEDURE — 93005 ELECTROCARDIOGRAM TRACING: CPT

## 2025-06-03 PROCEDURE — 82550 ASSAY OF CK (CPK): CPT

## 2025-06-03 PROCEDURE — 83970 ASSAY OF PARATHORMONE: CPT

## 2025-06-03 PROCEDURE — 93010 ELECTROCARDIOGRAM REPORT: CPT | Performed by: INTERNAL MEDICINE

## 2025-06-03 PROCEDURE — 6360000002 HC RX W HCPCS: Performed by: HOSPITALIST

## 2025-06-03 PROCEDURE — 83036 HEMOGLOBIN GLYCOSYLATED A1C: CPT

## 2025-06-03 PROCEDURE — 86706 HEP B SURFACE ANTIBODY: CPT

## 2025-06-03 PROCEDURE — 94760 N-INVAS EAR/PLS OXIMETRY 1: CPT

## 2025-06-03 RX ORDER — TAMSULOSIN HYDROCHLORIDE 0.4 MG/1
0.4 CAPSULE ORAL DAILY
Status: DISCONTINUED | OUTPATIENT
Start: 2025-06-03 | End: 2025-06-07

## 2025-06-03 RX ORDER — OXYCODONE HYDROCHLORIDE 10 MG/1
10 TABLET ORAL 2 TIMES DAILY PRN
Refills: 0 | Status: DISCONTINUED | OUTPATIENT
Start: 2025-06-03 | End: 2025-06-06 | Stop reason: SDUPTHER

## 2025-06-03 RX ORDER — PREGABALIN 50 MG/1
50 CAPSULE ORAL 2 TIMES DAILY
Status: DISCONTINUED | OUTPATIENT
Start: 2025-06-03 | End: 2025-06-14 | Stop reason: HOSPADM

## 2025-06-03 RX ORDER — FERROUS SULFATE 325(65) MG
325 TABLET ORAL 2 TIMES DAILY
Status: DISCONTINUED | OUTPATIENT
Start: 2025-06-03 | End: 2025-06-14 | Stop reason: HOSPADM

## 2025-06-03 RX ORDER — ERGOCALCIFEROL 1.25 MG/1
50000 CAPSULE, LIQUID FILLED ORAL WEEKLY
Status: DISCONTINUED | OUTPATIENT
Start: 2025-06-03 | End: 2025-06-14 | Stop reason: HOSPADM

## 2025-06-03 RX ORDER — ROSUVASTATIN CALCIUM 10 MG/1
10 TABLET, COATED ORAL NIGHTLY
Status: DISCONTINUED | OUTPATIENT
Start: 2025-06-03 | End: 2025-06-14 | Stop reason: HOSPADM

## 2025-06-03 RX ORDER — DOCUSATE SODIUM 100 MG/1
100 CAPSULE, LIQUID FILLED ORAL 2 TIMES DAILY
Status: DISCONTINUED | OUTPATIENT
Start: 2025-06-03 | End: 2025-06-09

## 2025-06-03 RX ORDER — CALCITRIOL 0.25 UG/1
0.25 CAPSULE, LIQUID FILLED ORAL DAILY
Status: DISCONTINUED | OUTPATIENT
Start: 2025-06-03 | End: 2025-06-14 | Stop reason: HOSPADM

## 2025-06-03 RX ORDER — BUSPIRONE HYDROCHLORIDE 10 MG/1
10 TABLET ORAL 3 TIMES DAILY PRN
Status: DISCONTINUED | OUTPATIENT
Start: 2025-06-03 | End: 2025-06-14 | Stop reason: HOSPADM

## 2025-06-03 RX ORDER — DONEPEZIL HYDROCHLORIDE 10 MG/1
10 TABLET, FILM COATED ORAL DAILY
Status: DISCONTINUED | OUTPATIENT
Start: 2025-06-03 | End: 2025-06-14 | Stop reason: HOSPADM

## 2025-06-03 RX ORDER — ISOSORBIDE DINITRATE 10 MG/1
10 TABLET ORAL 3 TIMES DAILY
Status: DISCONTINUED | OUTPATIENT
Start: 2025-06-03 | End: 2025-06-05

## 2025-06-03 RX ORDER — FAMOTIDINE 20 MG/1
20 TABLET, FILM COATED ORAL 2 TIMES DAILY
Status: DISCONTINUED | OUTPATIENT
Start: 2025-06-03 | End: 2025-06-04

## 2025-06-03 RX ORDER — PANTOPRAZOLE SODIUM 40 MG/1
40 TABLET, DELAYED RELEASE ORAL DAILY
Status: DISCONTINUED | OUTPATIENT
Start: 2025-06-03 | End: 2025-06-14 | Stop reason: HOSPADM

## 2025-06-03 RX ORDER — INSULIN LISPRO 100 [IU]/ML
7 INJECTION, SOLUTION INTRAVENOUS; SUBCUTANEOUS
Status: DISCONTINUED | OUTPATIENT
Start: 2025-06-03 | End: 2025-06-14 | Stop reason: HOSPADM

## 2025-06-03 RX ADMIN — TAMSULOSIN HYDROCHLORIDE 0.4 MG: 0.4 CAPSULE ORAL at 12:50

## 2025-06-03 RX ADMIN — PREGABALIN 50 MG: 50 CAPSULE ORAL at 12:49

## 2025-06-03 RX ADMIN — OXYCODONE HYDROCHLORIDE 10 MG: 10 TABLET ORAL at 19:01

## 2025-06-03 RX ADMIN — INSULIN LISPRO 1 UNITS: 100 INJECTION, SOLUTION INTRAVENOUS; SUBCUTANEOUS at 12:50

## 2025-06-03 RX ADMIN — ERGOCALCIFEROL 50000 UNITS: 1.25 CAPSULE ORAL at 12:49

## 2025-06-03 RX ADMIN — FAMOTIDINE 20 MG: 20 TABLET, FILM COATED ORAL at 19:02

## 2025-06-03 RX ADMIN — HEPARIN SODIUM 5000 UNITS: 5000 INJECTION INTRAVENOUS; SUBCUTANEOUS at 21:30

## 2025-06-03 RX ADMIN — FERROUS SULFATE TAB 325 MG (65 MG ELEMENTAL FE) 325 MG: 325 (65 FE) TAB at 12:49

## 2025-06-03 RX ADMIN — METOPROLOL SUCCINATE 50 MG: 25 TABLET, FILM COATED, EXTENDED RELEASE ORAL at 09:00

## 2025-06-03 RX ADMIN — HEPARIN SODIUM 5000 UNITS: 5000 INJECTION INTRAVENOUS; SUBCUTANEOUS at 05:33

## 2025-06-03 RX ADMIN — HEPARIN SODIUM 5000 UNITS: 5000 INJECTION INTRAVENOUS; SUBCUTANEOUS at 14:32

## 2025-06-03 RX ADMIN — OXYCODONE HYDROCHLORIDE 10 MG: 10 TABLET ORAL at 12:57

## 2025-06-03 RX ADMIN — INSULIN LISPRO 5 UNITS: 100 INJECTION, SOLUTION INTRAVENOUS; SUBCUTANEOUS at 12:51

## 2025-06-03 RX ADMIN — SODIUM CHLORIDE, PRESERVATIVE FREE 10 ML: 5 INJECTION INTRAVENOUS at 09:01

## 2025-06-03 RX ADMIN — ROSUVASTATIN 10 MG: 10 TABLET, FILM COATED ORAL at 19:46

## 2025-06-03 RX ADMIN — ISOSORBIDE DINITRATE 10 MG: 10 TABLET ORAL at 17:21

## 2025-06-03 RX ADMIN — FERROUS SULFATE TAB 325 MG (65 MG ELEMENTAL FE) 325 MG: 325 (65 FE) TAB at 19:02

## 2025-06-03 RX ADMIN — DONEPEZIL HYDROCHLORIDE 10 MG: 10 TABLET, FILM COATED ORAL at 12:49

## 2025-06-03 RX ADMIN — PREGABALIN 50 MG: 50 CAPSULE ORAL at 19:30

## 2025-06-03 RX ADMIN — DOCUSATE SODIUM 100 MG: 100 CAPSULE, LIQUID FILLED ORAL at 19:02

## 2025-06-03 RX ADMIN — INSULIN LISPRO 7 UNITS: 100 INJECTION, SOLUTION INTRAVENOUS; SUBCUTANEOUS at 17:21

## 2025-06-03 RX ADMIN — DOCUSATE SODIUM 100 MG: 100 CAPSULE, LIQUID FILLED ORAL at 12:49

## 2025-06-03 RX ADMIN — INSULIN GLARGINE 10 UNITS: 100 INJECTION, SOLUTION SUBCUTANEOUS at 20:30

## 2025-06-03 RX ADMIN — INSULIN GLARGINE 10 UNITS: 100 INJECTION, SOLUTION SUBCUTANEOUS at 09:01

## 2025-06-03 RX ADMIN — PANTOPRAZOLE SODIUM 40 MG: 40 TABLET, DELAYED RELEASE ORAL at 12:49

## 2025-06-03 RX ADMIN — FAMOTIDINE 20 MG: 20 TABLET, FILM COATED ORAL at 12:49

## 2025-06-03 RX ADMIN — INSULIN LISPRO 1 UNITS: 100 INJECTION, SOLUTION INTRAVENOUS; SUBCUTANEOUS at 17:21

## 2025-06-03 RX ADMIN — CALCITRIOL CAPSULES 0.25 MCG 0.25 MCG: 0.25 CAPSULE ORAL at 12:49

## 2025-06-03 RX ADMIN — SODIUM CHLORIDE, PRESERVATIVE FREE 10 ML: 5 INJECTION INTRAVENOUS at 19:03

## 2025-06-03 ASSESSMENT — PAIN - FUNCTIONAL ASSESSMENT
PAIN_FUNCTIONAL_ASSESSMENT: PREVENTS OR INTERFERES WITH MANY ACTIVE NOT PASSIVE ACTIVITIES
PAIN_FUNCTIONAL_ASSESSMENT: PREVENTS OR INTERFERES SOME ACTIVE ACTIVITIES AND ADLS

## 2025-06-03 ASSESSMENT — PAIN DESCRIPTION - DESCRIPTORS
DESCRIPTORS: ACHING
DESCRIPTORS: ACHING;DISCOMFORT
DESCRIPTORS: ACHING

## 2025-06-03 ASSESSMENT — PAIN SCALES - GENERAL
PAINLEVEL_OUTOF10: 4
PAINLEVEL_OUTOF10: 3
PAINLEVEL_OUTOF10: 8
PAINLEVEL_OUTOF10: 6

## 2025-06-03 ASSESSMENT — PAIN DESCRIPTION - PAIN TYPE: TYPE: CHRONIC PAIN;ACUTE PAIN

## 2025-06-03 ASSESSMENT — PAIN DESCRIPTION - LOCATION
LOCATION: OTHER (COMMENT)
LOCATION: GENERALIZED
LOCATION: GENERALIZED

## 2025-06-03 ASSESSMENT — PAIN DESCRIPTION - FREQUENCY: FREQUENCY: INTERMITTENT

## 2025-06-03 ASSESSMENT — PAIN DESCRIPTION - ONSET: ONSET: ON-GOING

## 2025-06-03 NOTE — CONSULTS
diabetic foot ulcer.  4.  CKD stage IV/5.  5.  Chronic atrial fibrillation on Eliquis.  6.  Remote tobacco use quit 1991.     PRESENTATION: Cholo Bhakta is a 69 y.o. year old male who presents with fall yesterday night from his recliner.  Apparently wife had left the room.  They were unable to get him up and he was confused and poorly responsive to them.  He has no recollection of events except this morning.  Currently more lucid and able to respond.  Has been largely confined to the recliner with limited mobility and recurrent hospitalizations.  Was hypoglycemic initially.   with creatinine of 5.3.  Currently not on hemodialysis.  No chest pain reported.  Had been on Bumex 3 mg p.o. twice daily.  Significant left foot wounds and due to see vascular for possible angiography.    REVIEW OF SYSTEMS:  Review of Systems   Constitutional:  Negative for activity change, diaphoresis and fatigue.   HENT:  Negative for hearing loss, nosebleeds and tinnitus.    Eyes:  Negative for visual disturbance.   Respiratory:  Positive for shortness of breath. Negative for cough and wheezing.    Cardiovascular:  Positive for leg swelling. Negative for chest pain and palpitations.   Gastrointestinal:  Negative for abdominal distention, abdominal pain, blood in stool, diarrhea and vomiting.   Endocrine: Negative for cold intolerance, heat intolerance, polydipsia, polyphagia and polyuria.   Genitourinary:  Negative for difficulty urinating, flank pain and hematuria.   Musculoskeletal:  Positive for gait problem. Negative for arthralgias, back pain, joint swelling and myalgias.   Skin:  Positive for wound. Negative for pallor and rash.   Neurological:  Negative for dizziness, seizures, syncope and headaches.   Psychiatric/Behavioral:  Positive for confusion. Negative for behavioral problems and dysphoric mood. The patient is not nervous/anxious.        Past Medical History:      Diagnosis Date    CHF (congestive heart failure) ([facility])   HISTORY:  Status post fall  TECHNIQUE:  CT of the cervical spine with multiplanar reformations.  FINDINGS:  Reformatted images demonstrate normal alignment with preservation of vertebral body height. Prior fusion C5 - C7.  No fracture seen on the axial or reformatted images. No acute surrounding soft tissue abnormalitites. Lung apices are clear.      No acute findings in the cervical spine.  All CT scans are performed using dose optimization techniques as appropriate to the performed exam and include at least one of the following: Automated exposure control, adjustment of the mA and/or kV according to size, and the use of iterative reconstruction technique.  ______________________________________ Electronically signed by: MICHAEL SMITH M.D. Date:     06/02/2025 Time:    05:19           Assessment and Plan:    This is a 69 y.o. year old male with past medical history of severe obesity, diabetes mellitus type 2, CKD stage IV, remote tobacco use, chronic atrial fibrillation on Eliquis, coronary artery disease with prior four-vessel CABG 9/2015, ejection fraction 45-50% (echo 3/8/2025) recurrent admissions with volume overload in the setting of renal dysfunction, presenting with fall and confusion in the setting of hyperglycemia, significant uremia with renal failure.    1.  Confusional state has improved and currently lucid.  Significant uremia and currently being planned for possible hemodialysis.  Will need permacath placement.  Also with significant wounds in the left lower extremity with plan for subsequent angiography with intervention by vascular to allow wound healing.  Continue diuresis if urinating.  proBNP appears to be at baseline at 6300 comparatively with prior admissions.  Most recent echo from 3/8/2025 with EF appearing stable at 45-50%.  Last catheterization from 2021 from [facility].  Will reevaluate cardiac status once initiated on hemodialysis and adequately diuresed.  Rates appear

## 2025-06-03 NOTE — CARE COORDINATION
Case Management Assessment  Initial Evaluation    Date/Time of Evaluation: 6/3/2025 3:17 PM  Assessment Completed by: Evie Carroll RN    If patient is discharged prior to next notation, then this note serves as note for discharge by case management.    Patient Name: Cholo Bhakta                   YOB: 1955  Diagnosis: Hypoglycemia [E16.2]  SANGEETA (acute kidney injury) [N17.9]  Open wound of left foot, initial encounter [S91.302A]  Fall, initial encounter [W19.XXXA]  Altered mental status, unspecified altered mental status type [R41.82]  Acute kidney injury superimposed on CKD [N17.9, N18.9]  Pressure injury of skin of sacral region, unspecified injury stage [L89.159]                   Date / Time: 6/2/2025  3:57 AM    Patient Admission Status: Inpatient   Readmission Risk (Low < 19, Mod (19-27), High > 27): Readmission Risk Score: 28.2    Current PCP: Nirmal Nuno MD  PCP verified by CM? (P) Yes    Chart Reviewed: Yes      History Provided by: (P) Spouse  Patient Orientation: (P) Other (see comment) (pt sleepng- assessment completed with spouse)    Patient Cognition:      Hospitalization in the last 30 days (Readmission):  No    If yes, Readmission Assessment in  Navigator will be completed.    Advance Directives:      Code Status: Full Code   Patient's Primary Decision Maker is: (P) Legal Next of Kin    Primary Decision Maker: Dee Bhakta - Spouse - [phone]    Discharge Planning:    Patient lives with: (P) Spouse/Significant Other Type of Home: (P) House  Primary Care Giver: (P) Self  Patient Support Systems include: (P) Spouse/Significant Other, Children, Family Members   Current Financial resources:    Current community resources:    Current services prior to admission: (P) None            Current DME:              Type of Home Care services:  (P) None    ADLS  Prior functional level: (P) Independent in ADLs/IADLs  Current functional level: (P) Assistance with the following:,  Equipment None   Bathroom Accessibility Accessible   Home Equipment Walker - Rolling;Wheelchair - Manual   Receives Help From Family   Prior Level of Assist for ADLs Independent  (mostly independent spouse helps as needed)   Prior Level of Assist for Homemaking Independent   Homemaking Responsibilities No   Ambulation Assistance Needs assistance   Prior Level of Assist for Transfers Independent   Active  Yes   Occupation On disability   Discharge Planning   Type of Residence House   Living Arrangements Spouse/Significant Other   Current Services Prior To Admission None   Potential Assistance Needed Other (Comment)  (outpt HD set up)   DME Ordered? Other (comment)  (unsure of needs - following , wound vac placed)   Type of Home Care Services None   Patient expects to be discharged to: Unknown   Condition of Participation: Discharge Planning   The Plan for Transition of Care is related to the following treatment goals: medical stability

## 2025-06-03 NOTE — CONSULTS
Palliative Care:  Pt known to palliative care. Initiated for support, goals of care, ACP review.  Pt presented to ED after a fall at home.  He has sacral wound as well as deep wounds of left foot.  Per notes pt is on abx tx and followed by wound care in [place].  Pt states he has not been feeling well x 1 week. Wife at bedside confirms decline.  Pt is resting in bed this morning. Alert and oriented, allowing his wife to answer most questions.  Multiple family members present.             Past Medical History:        Past Medical History:   Diagnosis Date    CHF (congestive heart failure) (HCC)     Chronic kidney disease     Diabetes mellitus (HCC)     Hyperlipidemia     Hypertension     Palliative care patient 03/07/2025       Advance Directives:    Full Code. Pt/spouse would like to have AD completed today. Palliative care  will follow up this afternoon.           Pain/Other Symptoms:  Pt points to epigastric area and states it is painful. Otherwise states no pain/discomfort.             How are symptoms affecting QOL  Spouse states up util about a week ao pt was enjoying outings, being outside. He amb with walker or w/c.  He has had a decline in his activity level.  Noted decreased appetite and fluid intake d/t strict I&O.         Psychological/Spiritual:  Emory Gibbs, good family support reported.                     Plan:   Will need perm cath for HD(spouse states she was told Dr. Lyons cannot do this today), will follow up tomorrow. Cardiology, Nephrology, wound care, Vascular following. PT/OT to eval and treat         Patient/family discussion r/t goals:           (what does living well look like to you)?  Preference to have pt get interventions and return to their home.  This RN did discuss SCOP spouse declines service at this time.              Palliative Performance Scale:        50    Palliative Care team will continue to follow and support, with ongoing review of pt's goals of care.

## 2025-06-03 NOTE — PROGRESS NOTES
Nephrology ([facility]) Progress Note    Patient:  Cholo Bhakta  YOB: 1955  Date of Service: 6/3/2025  MRN: [id number]   Acct: [id number]   Primary Care Physician: Nirmal Nuno MD  Advance Directive: Full Code  Admit Date: 6/2/2025       Hospital Day: 1  Referring Provider: Sudhakar Sanders DO    Patient independently seen and examined, Chart, Consults, Notes, Operative notes, Labs, Cardiology, and Radiology studies reviewed as available.    Chief complaint: Abnormal labs.    Subjective:  Cholo Bhakta is a 69 y.o. male for whom we were consulted for evaluation and treatment of chronic kidney disease/progressed to end-stage renal disease.  Patient has multiple comorbid condition including morbid abdominal obesity, coronary artery disease, CABG, peripheral vascular disease, CVA, congestive heart failure, diabetic foot ulcer and hypertension.  He follows up in the office.  He has progressive decline in renal function however has refused dialysis treatment previously.  Presented to the emergency room complaining of fall, profound fatigue, lack of energy, sleepiness with poor appetite.  His wife tells me that he remains in the bed most of the time.  Routine lab data indicated serum creatinine now 5.1 mg with blood urea nitrogen more than 100 mg.  He now agreed to proceed with dialysis.  Patient also had pending arteriogram/runoff for the treatment of peripheral vascular disease affecting his bilateral lower extremities.    Allergies:  Cefepime, Bactrim [sulfamethoxazole-trimethoprim], and Metronidazole    Medicines:  Current Facility-Administered Medications   Medication Dose Route Frequency Provider Last Rate Last Admin    busPIRone (BUSPAR) tablet 10 mg  10 mg Oral TID PRN Ramu Esposito, APRN - CNP        calcitRIOL (ROCALTROL) capsule 0.25 mcg  0.25 mcg Oral Daily Ramu Esposito, APRN - CNP        docusate sodium (COLACE) capsule 100 mg  100 mg Oral BID Ramu Esposito,  APRN - CNP        donepezil (ARICEPT) tablet 10 mg  10 mg Oral Daily [facility], APRN - CNP        vitamin D (ERGOCALCIFEROL) capsule 50,000 Units  50,000 Units Oral Weekly [facility], APRN - CNP        famotidine (PEPCID) tablet 20 mg  20 mg Oral BID [facility], APRN - CHITRA        ferrous sulfate (IRON 325) tablet 325 mg  325 mg Oral BID [facility], APRN - CNP        isosorbide dinitrate (ISORDIL) tablet 10 mg  10 mg Oral TID [facility], APRN - CHITRA        oxyCODONE HCl (OXY-IR) immediate release tablet 10 mg  10 mg Oral BID PRN [facility], APRN - CHITRA        pantoprazole (PROTONIX) tablet 40 mg  40 mg Oral Daily [facility], APRN - CHITRA        rosuvastatin (CRESTOR) tablet 10 mg  10 mg Oral Nightly [facility], APRN - CNP        tamsulosin (FLOMAX) capsule 0.4 mg  0.4 mg Oral Daily [facility], CORI - CNP        pregabalin (LYRICA) capsule 50 mg  50 mg Oral BID [facility], APRN - CNP        sodium chloride flush 0.9 % injection 5-40 mL  5-40 mL IntraVENous 2 times per day Luiz Muñoz MD   10 mL at 06/03/25 0901    sodium chloride flush 0.9 % injection 10 mL  10 mL IntraVENous PRN Luiz Muñoz MD        0.9 % sodium chloride infusion   IntraVENous PRN Luiz Muñoz MD        ondansetron (ZOFRAN-ODT) disintegrating tablet 4 mg  4 mg Oral Q8H PRN Luiz Muñoz MD        Or    ondansetron (ZOFRAN) injection 4 mg  4 mg IntraVENous Q6H PRN Luiz Muñoz MD        magnesium hydroxide (MILK OF MAGNESIA) 400 MG/5ML suspension 30 mL  30 mL Oral Daily PRN Luiz Muñoz MD        acetaminophen (TYLENOL) tablet 650 mg  650 mg Oral Q6H PRN Luiz Muñoz MD   650 mg at 06/02/25 0826    Or    acetaminophen (TYLENOL) suppository 650 mg  650 mg Rectal Q6H PRN Luiz Muñoz MD        heparin (porcine) injection 5,000 Units  5,000 Units SubCUTAneous 3 times per day Luiz Muñoz MD   5,000 Units at 06/03/25 7474    glucose chewable tablet 16 g  4 tablet

## 2025-06-03 NOTE — PROGRESS NOTES
[facility]      Patient:  Cholo Bhakta  YOB: 1955  Date of Service: 6/3/2025  MRN: [id number]   Acct: [id number]   Primary Care Physician: Nirmal Nuno MD  Advance Directive: Full Code  Admit Date: 6/2/2025       Hospital Day: 1  Portions of this note have been copied forward, however, changed to reflect the most current clinical status of this patient    CHIEF COMPLAINT fall    SUBJECTIVE: resting comfortably in bed, family updated on plan     Cumulative hospital course   69-year-old male with HTN, hyperlipidemia, type II DM, CKD 4, chronic AF on anticoagulation, chronic systolic heart failure, CAD s/p CABG 2015, with complaints of fall.  Patient unable to provide HPI obtained earlier from spouse at bedside.  Spouse reported that patient not been feeling well ongoing for the past week.  Does have chronic wounds to sacral area and left that he follows with outpatient wound care in [place].  Has been following closely with cardiology recently seen on 5/20 and recently increased Bumex to 3 mg twice daily along with Zaroxolyn after complaints of increased weight gain and dyspnea.  Have discussed with nephrology past regarding dialysis and at that time he had declined.  Workup in ER CT head no acute intracranial abnormality, CT cervical/lumbar spine no acute osseous abnormality, CT pelvis no acute findings, CXR no acute cardiopulmonary process, creatinine 5.3 .  Patient admitted to hospitalist service with consultation to nephrology and podiatry.  Chronic appearing left foot wound along with coccyx wound we will consult wound care and monitor off antibiotics. Podiatry evaluated no acute infection, will remain on wound vac and follow up with DR Heaton left chronic foot ulcer. After further discussion with spouse and patient agreeable to dialysis. Vascular consulted and plan for permacath placement tomorrow. Has had intermittent chest pain at rest, EKG reviewed no acute ischemic changes.  Chronic obstructive pulmonary disease    No signs of exacerbation   Atrial fibrillation    Currently rate controlled    Toprol XL    Monitor on telemetry   Essential hypertension   Currently 113/59   Monitor vital signs    Memory loss   Improving   Aricept     DVT Prophylaxis: Heparin subcutaneously       CORI Short - CNP, 6/3/2025 1:05 PM

## 2025-06-03 NOTE — PROGRESS NOTES
Comprehensive Nutrition Assessment    Type and Reason for Visit:  Reassess    Nutrition Recommendations/Plan:   Follow for advancing diet, monitor labs     Malnutrition Assessment:  Malnutrition Status:  Severe malnutrition (06/03/25 0831)    Context:  Acute Illness     Findings of the 6 clinical characteristics of malnutrition:  Energy Intake:  50% or less of estimated energy requirements for 5 or more days  Weight Loss:  No weight loss     Body Fat Loss:  No body fat loss     Muscle Mass Loss:  No muscle mass loss    Fluid Accumulation:  Moderate to Severe Extremities   Strength:  Not Performed    Nutrition Assessment:    Patient remains NPO.  Weight has changed from admission weight.  Current weight is approx 31# or  10.1% from estimated weight.  Remains 7.1% above UBW.  Aware pt now has wound vac placed  Glucose 202-366, Accuchek's 219-284    Nutrition Related Findings:    +2 pitting BLE edema., +1 BUE edema Wound Type: Multiple, Diabetic Ulcer, Wound Vac       Current Nutrition Intake & Therapies:    Average Meal Intake: NPO  Average Supplements Intake: NPO  Diet NPO    Anthropometric Measures:  Height: 182.9 cm (6' 0.01\")  Ideal Body Weight (IBW): 178 lbs (81 kg)    Admission Body Weight: 139.3 kg (307 lb 1.6 oz)  Current Body Weight: 125.3 kg (276 lb 3.8 oz), 155.2 % IBW. Weight Source: Bed scale  Current BMI (kg/m2): 37.5  Usual Body Weight: 117 kg (257 lb 15 oz)  % Weight Change (Calculated): 7.1  Weight Adjustment For: No Adjustment  BMI Categories: Obese Class 2 (BMI 35.0 -39.9)    Estimated Daily Nutrient Needs:  Energy Requirements Based On: Kcal/kg  Weight Used for Energy Requirements: Current  Energy (kcal/day): 2891-4770 kcals (8-15 kcals/kg)  Weight Used for Protein Requirements: Ideal  Protein (g/day): 149g  Method Used for Fluid Requirements: 1 ml/kcal  Fluid (ml/day): 6864-0650 ml    Nutrition Diagnosis:   Inadequate oral intake related to acute injury/trauma, increase demand for

## 2025-06-03 NOTE — PLAN OF CARE
Problem: Nutrition Deficit:  Goal: Optimize nutritional status  6/3/2025 0833 by Deidre Azevedo, MS, RD, LD  Outcome: Not Progressing  Flowsheets (Taken 6/3/2025 0822)  Nutrient intake appropriate for improving, restoring, or maintaining nutritional needs: Assess nutritional status and recommend course of action  6/3/2025 0016 by Suha Michaels, RN  Outcome: Progressing

## 2025-06-03 NOTE — PROGRESS NOTES
Physical Therapy  Name: Cholo Bhakta  MRN:  [id number]  Date of service:  6/3/2025    Pt. currently with bedrest order per cardiology. RN, Emy, aware. Will f/u at at later time.    Electronically signed by Clarissa Be, PT on 6/3/2025 at 12:09 PM

## 2025-06-03 NOTE — PROGRESS NOTES
Maia Narayanan visited with pt, and asked this  to to assist him with completing a LW. Pt's wife says pt has a LW at [facility]. Pt gave permission to request the document. Pt says he is Judaism, and his ana maría is important to him. This  provided spiritual care with sustaining presence, support, and prayer. Also requested a copy of pt's LW which has been faxed to [facility]. Pt and his wife expressed gratitude for spiritual care.         Spiritual Health History and Assessment/Progress Note  [facility]    Initial Encounter, Advance Care Planning, Spiritual/Emotional Needs,  ,  ,      Name: Cholo Bhakta MRN: [id number]    Age: 69 y.o.     Sex: male   Language: English   Sabianist: Judaism   SANGEETA (acute kidney injury)     Date: 6/3/2025            Total Time Calculated: 20 min              Spiritual Assessment began in [facility] 3 [facility]/VAS/MED        Referral/Consult From: Palliative Care   Encounter Overview/Reason: Initial Encounter, Advance Care Planning, Spiritual/Emotional Needs  Service Provided For: Patient, Family    Ana María, Belief, Meaning:   Patient identifies as spiritual and is connected with a ana maría tradition or spiritual practice  Family/Friends identify as spiritual and are connected with a ana maría tradition or spiritual practice      Importance and Influence:  Patient has spiritual/personal beliefs that influence decisions regarding their health  Family/Friends have spiritual/personal beliefs that influence decisions regarding the patient's health    Community:  Patient Other: Pt did not share a ana maría community.  Family/Friends Other: Did not share a ana maíra community.    Assessment and Plan of Care:     Patient Interventions include: Provided sacramental/Denominational ritual  Family/Friends Interventions include: Provided sacramental/Denominational ritual    Patient Plan of Care: Spiritual Care available upon further referral  Family/Friends Plan of Care: Spiritual Care available upon

## 2025-06-03 NOTE — CONSULTS
Orthopaedic Inpatient Consultation    NAME:  Cholo Bhakta   :    1955  MRN:    [id number]    2025  3:57 AM        CHIEF COMPLAINT: chronic diabetic ulcer of the left foot      HISTORY OF PRESENT ILLNESS:   The patient is a 69 y.o. male who presents with the above complaint. Patient was admitted for a fall and SANGEETA, all ED scans were negative for acute fractures or dislocations. Patient and family report that this foot wound started about a year ago with a diabetic foot ulcer that required surgical intervention from Dr. Heaton, podiatrist in [place]. He reports he recently finished a course of PO antibiotics prescribed by Dr. Heaton. The patient had an appointment with Dr. Heaton this week and missed it due to hospital admission. Dr. Glover, podiatry, consulted for further management.     Past Medical History:        Diagnosis Date    CHF (congestive heart failure) (HCC)     Chronic kidney disease     Diabetes mellitus (HCC)     Hyperlipidemia     Hypertension     Palliative care patient 2025       Past Surgical History:        Procedure Laterality Date    CORONARY ARTERY BYPASS GRAFT         Current Medications:   Prior to Admission medications    Medication Sig Start Date End Date Taking? Authorizing Provider   metoprolol succinate (TOPROL XL) 50 MG extended release tablet Take 1 tablet by mouth daily 25  Yes Scott Wilburn APRN   bumetanide (BUMEX) 2 MG tablet Take 1.5 tablets by mouth 2 times daily   Yes Provider, MD Padmini   metOLazone (ZAROXOLYN) 5 MG tablet Take 1 tablet by mouth Every Monday, Wednesday, and Friday  Patient taking differently: Take 1 tablet by mouth daily 25  Yes Brandee Forrest APRN - CNP   Ergocalciferol (VITAMIN D) 65556 units CAPS Take 50,000 Units by mouth once a week 3/15/25  Yes Ramu Esposito APRN - CNP   insulin glargine (LANTUS SOLOSTAR) 100 UNIT/ML injection pen Inject 20 Units into the skin nightly Every evening for 90 days starting 3/5/25 3/14/25  pulses in upper and lower extremities.  Lymph:  No cervical or inguinal lymphadenopathy noted.  Sensation:  Grossly intact to light touch.  DTR:  Normal, no pathologic reflexes.  Coordination/balance:  Normal    Musculoskeletal:  Right upper extremity exam:  There is no tenderness to palpation about the shoulder, elbow, wrist or hand. Range of motion normal  .  5/5 strength, normal sensation, good radial pulse and skin is normal.      Left upper extremity exam:  There is no tenderness to palpation about the shoulder, elbow, wrist or hand. Range of motion normal .   5/5 strength, normal sensation, good radial pulse and skin is normal.     Right lower extremity exam:  There is no tenderness to palpation about the hip, knee, ankle or foot. Range of motion normal .  5/5 strength, good dorsalis pedis pulse. Sensation impaired 2/2 diabetic neuropathy.     Left lower extremity exam:  Left foot - wound vac in place and functioning properly. +2 palpable dorsalis pedis pulse. There is no redness or swelling to foot. 5/5 strength with impaired sensation 2/2 diabetic neuropathy.     DATA:    CBC with Differential:    Lab Results   Component Value Date/Time    WBC 6.4 06/03/2025 04:02 AM    RBC 4.01 06/03/2025 04:02 AM    HGB 11.6 06/03/2025 04:02 AM    HCT 35.5 06/03/2025 04:02 AM     06/03/2025 04:02 AM    MCV 88.5 06/03/2025 04:02 AM    MCH 28.9 06/03/2025 04:02 AM    MCHC 32.7 06/03/2025 04:02 AM    RDW 14.2 06/03/2025 04:02 AM    LYMPHOPCT 19.7 06/03/2025 04:02 AM    MONOPCT 13.8 06/03/2025 04:02 AM    EOSPCT 3.0 06/03/2025 04:02 AM    BASOPCT 0.6 06/03/2025 04:02 AM    MONOSABS 0.90 06/03/2025 04:02 AM    LYMPHSABS 1.3 06/03/2025 04:02 AM    EOSABS 0.20 06/03/2025 04:02 AM    BASOSABS 0.00 06/03/2025 04:02 AM     CMP:    Lab Results   Component Value Date/Time     06/03/2025 04:02 AM    K 3.5 06/03/2025 04:02 AM    CL 93 06/03/2025 04:02 AM    CO2 28 06/03/2025 04:02 AM     06/03/2025 04:02 AM

## 2025-06-03 NOTE — PROGRESS NOTES
Occupational Therapy  Facility/Department: [facility] 3 KENNETH/VAS/MED  NAME: Cholo Bhakta  : 1955  MRN: [id number]    Date of Service: 6/3/2025    Pt. currently with bedrest order per cardiology. RN, Emy, aware. Will f/u at at later time.     Casandra Gann OT  Electronically signed by Casandra Gann OT on 6/3/2025 at 3:00 PM

## 2025-06-04 ENCOUNTER — ANESTHESIA (OUTPATIENT)
Dept: OPERATING ROOM | Age: 70
End: 2025-06-04
Payer: MEDICARE

## 2025-06-04 ENCOUNTER — APPOINTMENT (OUTPATIENT)
Dept: INTERVENTIONAL RADIOLOGY/VASCULAR | Age: 70
End: 2025-06-04
Payer: MEDICARE

## 2025-06-04 ENCOUNTER — ANESTHESIA EVENT (OUTPATIENT)
Dept: OPERATING ROOM | Age: 70
End: 2025-06-04
Payer: MEDICARE

## 2025-06-04 LAB
ALBUMIN SERPL-MCNC: 3.2 G/DL (ref 3.5–5.2)
ALP SERPL-CCNC: 129 U/L (ref 40–129)
ALT SERPL-CCNC: 15 U/L (ref 10–50)
ANION GAP SERPL CALCULATED.3IONS-SCNC: 18 MMOL/L (ref 8–16)
AST SERPL-CCNC: 32 U/L (ref 10–50)
BASOPHILS # BLD: 0.1 K/UL (ref 0–0.2)
BASOPHILS NFR BLD: 0.7 % (ref 0–1)
BILIRUB SERPL-MCNC: 0.6 MG/DL (ref 0.2–1.2)
BUN SERPL-MCNC: 110 MG/DL (ref 8–23)
CALCIUM SERPL-MCNC: 9.3 MG/DL (ref 8.8–10.2)
CHLORIDE SERPL-SCNC: 95 MMOL/L (ref 98–107)
CO2 SERPL-SCNC: 25 MMOL/L (ref 22–29)
CREAT SERPL-MCNC: 4.3 MG/DL (ref 0.7–1.2)
EKG P AXIS: NORMAL DEGREES
EKG P-R INTERVAL: NORMAL MS
EKG Q-T INTERVAL: 386 MS
EKG QRS DURATION: 174 MS
EKG QTC CALCULATION (BAZETT): 422 MS
EKG T AXIS: 76 DEGREES
EOSINOPHIL # BLD: 0.4 K/UL (ref 0–0.6)
EOSINOPHIL NFR BLD: 5.3 % (ref 0–5)
ERYTHROCYTE [DISTWIDTH] IN BLOOD BY AUTOMATED COUNT: 14.2 % (ref 11.5–14.5)
GLUCOSE BLD-MCNC: 124 MG/DL (ref 70–99)
GLUCOSE BLD-MCNC: 143 MG/DL (ref 70–99)
GLUCOSE BLD-MCNC: 149 MG/DL (ref 70–99)
GLUCOSE BLD-MCNC: 152 MG/DL (ref 70–99)
GLUCOSE BLD-MCNC: 170 MG/DL (ref 70–99)
GLUCOSE SERPL-MCNC: 124 MG/DL (ref 70–99)
HAV IGM SERPL QL IA: NONREACTIVE
HBV CORE IGM SERPL QL IA: NONREACTIVE
HBV SURFACE AG SERPL QL IA: NORMAL
HCT VFR BLD AUTO: 37.4 % (ref 42–52)
HCV AB SERPL QL IA: NORMAL
HGB BLD-MCNC: 12 G/DL (ref 14–18)
IMM GRANULOCYTES # BLD: 0 K/UL
LYMPHOCYTES # BLD: 1.4 K/UL (ref 1.1–4.5)
LYMPHOCYTES NFR BLD: 20.2 % (ref 20–40)
MAGNESIUM SERPL-MCNC: 3.3 MG/DL (ref 1.6–2.4)
MCH RBC QN AUTO: 28.7 PG (ref 27–31)
MCHC RBC AUTO-ENTMCNC: 32.1 G/DL (ref 33–37)
MCV RBC AUTO: 89.5 FL (ref 80–94)
MONOCYTES # BLD: 1 K/UL (ref 0–0.9)
MONOCYTES NFR BLD: 14.6 % (ref 0–10)
NEUTROPHILS # BLD: 4.1 K/UL (ref 1.5–7.5)
NEUTS SEG NFR BLD: 58.9 % (ref 50–65)
PERFORMED ON: ABNORMAL
PLATELET # BLD AUTO: 207 K/UL (ref 130–400)
PMV BLD AUTO: 12.3 FL (ref 9.4–12.4)
POTASSIUM SERPL-SCNC: 3.4 MMOL/L (ref 3.5–5)
PROT SERPL-MCNC: 6.9 G/DL (ref 6.4–8.3)
RBC # BLD AUTO: 4.18 M/UL (ref 4.7–6.1)
SODIUM SERPL-SCNC: 138 MMOL/L (ref 136–145)
WBC # BLD AUTO: 7 K/UL (ref 4.8–10.8)

## 2025-06-04 PROCEDURE — 0JH63XZ INSERTION OF TUNNELED VASCULAR ACCESS DEVICE INTO CHEST SUBCUTANEOUS TISSUE AND FASCIA, PERCUTANEOUS APPROACH: ICD-10-PCS | Performed by: SURGERY

## 2025-06-04 PROCEDURE — 7100000001 HC PACU RECOVERY - ADDTL 15 MIN: Performed by: SURGERY

## 2025-06-04 PROCEDURE — 97162 PT EVAL MOD COMPLEX 30 MIN: CPT

## 2025-06-04 PROCEDURE — 3600000013 HC SURGERY LEVEL 3 ADDTL 15MIN: Performed by: SURGERY

## 2025-06-04 PROCEDURE — 6360000002 HC RX W HCPCS

## 2025-06-04 PROCEDURE — 7100000000 HC PACU RECOVERY - FIRST 15 MIN: Performed by: SURGERY

## 2025-06-04 PROCEDURE — 99232 SBSQ HOSP IP/OBS MODERATE 35: CPT | Performed by: INTERNAL MEDICINE

## 2025-06-04 PROCEDURE — 97605 NEG PRS WND THER DME<=50SQCM: CPT

## 2025-06-04 PROCEDURE — 2500000003 HC RX 250 WO HCPCS: Performed by: HOSPITALIST

## 2025-06-04 PROCEDURE — 97110 THERAPEUTIC EXERCISES: CPT

## 2025-06-04 PROCEDURE — 2709999900 HC NON-CHARGEABLE SUPPLY: Performed by: SURGERY

## 2025-06-04 PROCEDURE — C1769 GUIDE WIRE: HCPCS | Performed by: SURGERY

## 2025-06-04 PROCEDURE — 83735 ASSAY OF MAGNESIUM: CPT

## 2025-06-04 PROCEDURE — 6360000002 HC RX W HCPCS: Performed by: SURGERY

## 2025-06-04 PROCEDURE — 6370000000 HC RX 637 (ALT 250 FOR IP): Performed by: SURGERY

## 2025-06-04 PROCEDURE — C1750 CATH, HEMODIALYSIS,LONG-TERM: HCPCS | Performed by: SURGERY

## 2025-06-04 PROCEDURE — 3700000001 HC ADD 15 MINUTES (ANESTHESIA): Performed by: SURGERY

## 2025-06-04 PROCEDURE — 4A10X4Z MONITORING OF CENTRAL NERVOUS ELECTRICAL ACTIVITY, EXTERNAL APPROACH: ICD-10-PCS | Performed by: SURGERY

## 2025-06-04 PROCEDURE — 1200000000 HC SEMI PRIVATE

## 2025-06-04 PROCEDURE — 93010 ELECTROCARDIOGRAM REPORT: CPT | Performed by: INTERNAL MEDICINE

## 2025-06-04 PROCEDURE — 97530 THERAPEUTIC ACTIVITIES: CPT

## 2025-06-04 PROCEDURE — 3700000000 HC ANESTHESIA ATTENDED CARE: Performed by: SURGERY

## 2025-06-04 PROCEDURE — 2500000003 HC RX 250 WO HCPCS: Performed by: SURGERY

## 2025-06-04 PROCEDURE — 97535 SELF CARE MNGMENT TRAINING: CPT

## 2025-06-04 PROCEDURE — 5A1D70Z PERFORMANCE OF URINARY FILTRATION, INTERMITTENT, LESS THAN 6 HOURS PER DAY: ICD-10-PCS | Performed by: SURGERY

## 2025-06-04 PROCEDURE — 3600000003 HC SURGERY LEVEL 3 BASE: Performed by: SURGERY

## 2025-06-04 PROCEDURE — 6370000000 HC RX 637 (ALT 250 FOR IP)

## 2025-06-04 PROCEDURE — 94760 N-INVAS EAR/PLS OXIMETRY 1: CPT

## 2025-06-04 PROCEDURE — 85025 COMPLETE CBC W/AUTO DIFF WBC: CPT

## 2025-06-04 PROCEDURE — 36415 COLL VENOUS BLD VENIPUNCTURE: CPT

## 2025-06-04 PROCEDURE — 76937 US GUIDE VASCULAR ACCESS: CPT

## 2025-06-04 PROCEDURE — 80074 ACUTE HEPATITIS PANEL: CPT

## 2025-06-04 PROCEDURE — 6370000000 HC RX 637 (ALT 250 FOR IP): Performed by: INTERNAL MEDICINE

## 2025-06-04 PROCEDURE — 6360000002 HC RX W HCPCS: Performed by: HOSPITALIST

## 2025-06-04 PROCEDURE — 97165 OT EVAL LOW COMPLEX 30 MIN: CPT

## 2025-06-04 PROCEDURE — 77001 FLUOROGUIDE FOR VEIN DEVICE: CPT

## 2025-06-04 PROCEDURE — 80053 COMPREHEN METABOLIC PANEL: CPT

## 2025-06-04 PROCEDURE — 82962 GLUCOSE BLOOD TEST: CPT

## 2025-06-04 PROCEDURE — 2580000003 HC RX 258

## 2025-06-04 RX ORDER — SODIUM CHLORIDE 0.9 % (FLUSH) 0.9 %
5-40 SYRINGE (ML) INJECTION PRN
Status: DISCONTINUED | OUTPATIENT
Start: 2025-06-04 | End: 2025-06-14 | Stop reason: HOSPADM

## 2025-06-04 RX ORDER — HYDROCODONE BITARTRATE AND ACETAMINOPHEN 5; 325 MG/1; MG/1
2 TABLET ORAL EVERY 4 HOURS PRN
Status: DISCONTINUED | OUTPATIENT
Start: 2025-06-04 | End: 2025-06-14 | Stop reason: HOSPADM

## 2025-06-04 RX ORDER — PROPOFOL 10 MG/ML
INJECTION, EMULSION INTRAVENOUS
Status: DISCONTINUED | OUTPATIENT
Start: 2025-06-04 | End: 2025-06-04 | Stop reason: SDUPTHER

## 2025-06-04 RX ORDER — CEFAZOLIN SODIUM 1 G/3ML
INJECTION, POWDER, FOR SOLUTION INTRAMUSCULAR; INTRAVENOUS
Status: DISCONTINUED | OUTPATIENT
Start: 2025-06-04 | End: 2025-06-04 | Stop reason: SDUPTHER

## 2025-06-04 RX ORDER — FAMOTIDINE 20 MG/1
20 TABLET, FILM COATED ORAL DAILY
Status: DISCONTINUED | OUTPATIENT
Start: 2025-06-04 | End: 2025-06-14 | Stop reason: HOSPADM

## 2025-06-04 RX ORDER — ESCITALOPRAM OXALATE 10 MG/1
10 TABLET ORAL DAILY
Status: DISCONTINUED | OUTPATIENT
Start: 2025-06-04 | End: 2025-06-14 | Stop reason: HOSPADM

## 2025-06-04 RX ORDER — SODIUM CHLORIDE 0.9 % (FLUSH) 0.9 %
5-40 SYRINGE (ML) INJECTION EVERY 12 HOURS SCHEDULED
Status: DISCONTINUED | OUTPATIENT
Start: 2025-06-04 | End: 2025-06-04 | Stop reason: HOSPADM

## 2025-06-04 RX ORDER — ONDANSETRON 2 MG/ML
4 INJECTION INTRAMUSCULAR; INTRAVENOUS
Status: DISCONTINUED | OUTPATIENT
Start: 2025-06-04 | End: 2025-06-05 | Stop reason: ALTCHOICE

## 2025-06-04 RX ORDER — SODIUM CHLORIDE 9 MG/ML
INJECTION, SOLUTION INTRAVENOUS PRN
Status: DISCONTINUED | OUTPATIENT
Start: 2025-06-04 | End: 2025-06-14 | Stop reason: HOSPADM

## 2025-06-04 RX ORDER — NALOXONE HYDROCHLORIDE 0.4 MG/ML
INJECTION, SOLUTION INTRAMUSCULAR; INTRAVENOUS; SUBCUTANEOUS PRN
Status: DISCONTINUED | OUTPATIENT
Start: 2025-06-04 | End: 2025-06-14 | Stop reason: HOSPADM

## 2025-06-04 RX ORDER — SODIUM CHLORIDE 450 MG/100ML
INJECTION, SOLUTION INTRAVENOUS
Status: DISCONTINUED | OUTPATIENT
Start: 2025-06-04 | End: 2025-06-04 | Stop reason: SDUPTHER

## 2025-06-04 RX ORDER — SODIUM CHLORIDE 0.9 % (FLUSH) 0.9 %
5-40 SYRINGE (ML) INJECTION EVERY 12 HOURS SCHEDULED
Status: DISCONTINUED | OUTPATIENT
Start: 2025-06-04 | End: 2025-06-14 | Stop reason: HOSPADM

## 2025-06-04 RX ORDER — ONDANSETRON 2 MG/ML
INJECTION INTRAMUSCULAR; INTRAVENOUS
Status: DISCONTINUED | OUTPATIENT
Start: 2025-06-04 | End: 2025-06-04 | Stop reason: SDUPTHER

## 2025-06-04 RX ORDER — MECOBALAMIN 5000 MCG
10 TABLET,DISINTEGRATING ORAL NIGHTLY
Status: DISCONTINUED | OUTPATIENT
Start: 2025-06-04 | End: 2025-06-14 | Stop reason: HOSPADM

## 2025-06-04 RX ORDER — HYDROCODONE BITARTRATE AND ACETAMINOPHEN 5; 325 MG/1; MG/1
1 TABLET ORAL EVERY 4 HOURS PRN
Status: DISCONTINUED | OUTPATIENT
Start: 2025-06-04 | End: 2025-06-14 | Stop reason: HOSPADM

## 2025-06-04 RX ORDER — SODIUM CHLORIDE 0.9 % (FLUSH) 0.9 %
5-40 SYRINGE (ML) INJECTION PRN
Status: DISCONTINUED | OUTPATIENT
Start: 2025-06-04 | End: 2025-06-04 | Stop reason: HOSPADM

## 2025-06-04 RX ORDER — HYDROMORPHONE HYDROCHLORIDE 1 MG/ML
0.5 INJECTION, SOLUTION INTRAMUSCULAR; INTRAVENOUS; SUBCUTANEOUS EVERY 5 MIN PRN
Status: DISCONTINUED | OUTPATIENT
Start: 2025-06-04 | End: 2025-06-05 | Stop reason: ALTCHOICE

## 2025-06-04 RX ORDER — HYDROMORPHONE HYDROCHLORIDE 1 MG/ML
0.25 INJECTION, SOLUTION INTRAMUSCULAR; INTRAVENOUS; SUBCUTANEOUS EVERY 5 MIN PRN
Status: DISCONTINUED | OUTPATIENT
Start: 2025-06-04 | End: 2025-06-05 | Stop reason: ALTCHOICE

## 2025-06-04 RX ORDER — SODIUM CHLORIDE, SODIUM LACTATE, POTASSIUM CHLORIDE, CALCIUM CHLORIDE 600; 310; 30; 20 MG/100ML; MG/100ML; MG/100ML; MG/100ML
INJECTION, SOLUTION INTRAVENOUS CONTINUOUS
Status: DISCONTINUED | OUTPATIENT
Start: 2025-06-04 | End: 2025-06-04 | Stop reason: HOSPADM

## 2025-06-04 RX ORDER — FENTANYL CITRATE 50 UG/ML
INJECTION, SOLUTION INTRAMUSCULAR; INTRAVENOUS
Status: DISCONTINUED | OUTPATIENT
Start: 2025-06-04 | End: 2025-06-04 | Stop reason: SDUPTHER

## 2025-06-04 RX ORDER — MORPHINE SULFATE 2 MG/ML
2 INJECTION, SOLUTION INTRAMUSCULAR; INTRAVENOUS EVERY 12 HOURS PRN
Status: DISCONTINUED | OUTPATIENT
Start: 2025-06-04 | End: 2025-06-14 | Stop reason: HOSPADM

## 2025-06-04 RX ORDER — SODIUM CHLORIDE 9 MG/ML
INJECTION, SOLUTION INTRAVENOUS PRN
Status: DISCONTINUED | OUTPATIENT
Start: 2025-06-04 | End: 2025-06-04 | Stop reason: HOSPADM

## 2025-06-04 RX ORDER — LIDOCAINE HYDROCHLORIDE 10 MG/ML
INJECTION, SOLUTION EPIDURAL; INFILTRATION; INTRACAUDAL; PERINEURAL
Status: DISCONTINUED | OUTPATIENT
Start: 2025-06-04 | End: 2025-06-04 | Stop reason: SDUPTHER

## 2025-06-04 RX ADMIN — CALCITRIOL CAPSULES 0.25 MCG 0.25 MCG: 0.25 CAPSULE ORAL at 07:38

## 2025-06-04 RX ADMIN — PREGABALIN 50 MG: 50 CAPSULE ORAL at 07:48

## 2025-06-04 RX ADMIN — FENTANYL CITRATE 100 MCG: 0.05 INJECTION, SOLUTION INTRAMUSCULAR; INTRAVENOUS at 18:11

## 2025-06-04 RX ADMIN — INSULIN GLARGINE 10 UNITS: 100 INJECTION, SOLUTION SUBCUTANEOUS at 20:30

## 2025-06-04 RX ADMIN — METOPROLOL SUCCINATE 50 MG: 25 TABLET, FILM COATED, EXTENDED RELEASE ORAL at 07:48

## 2025-06-04 RX ADMIN — CEFAZOLIN 3 G: 1 INJECTION, POWDER, FOR SOLUTION INTRAMUSCULAR; INTRAVENOUS at 18:34

## 2025-06-04 RX ADMIN — SODIUM CHLORIDE: 4.5 INJECTION, SOLUTION INTRAVENOUS at 18:11

## 2025-06-04 RX ADMIN — PROPOFOL 100 MG: 10 INJECTION, EMULSION INTRAVENOUS at 18:17

## 2025-06-04 RX ADMIN — DOCUSATE SODIUM 100 MG: 100 CAPSULE, LIQUID FILLED ORAL at 20:02

## 2025-06-04 RX ADMIN — MORPHINE SULFATE 2 MG: 2 INJECTION, SOLUTION INTRAMUSCULAR; INTRAVENOUS at 11:17

## 2025-06-04 RX ADMIN — INSULIN GLARGINE 10 UNITS: 100 INJECTION, SOLUTION SUBCUTANEOUS at 07:38

## 2025-06-04 RX ADMIN — TAMSULOSIN HYDROCHLORIDE 0.4 MG: 0.4 CAPSULE ORAL at 07:38

## 2025-06-04 RX ADMIN — PANTOPRAZOLE SODIUM 40 MG: 40 TABLET, DELAYED RELEASE ORAL at 07:38

## 2025-06-04 RX ADMIN — FERROUS SULFATE TAB 325 MG (65 MG ELEMENTAL FE) 325 MG: 325 (65 FE) TAB at 20:03

## 2025-06-04 RX ADMIN — HEPARIN SODIUM 5000 UNITS: 5000 INJECTION INTRAVENOUS; SUBCUTANEOUS at 05:39

## 2025-06-04 RX ADMIN — SODIUM CHLORIDE, PRESERVATIVE FREE 10 ML: 5 INJECTION INTRAVENOUS at 07:39

## 2025-06-04 RX ADMIN — PREGABALIN 50 MG: 50 CAPSULE ORAL at 20:30

## 2025-06-04 RX ADMIN — ROSUVASTATIN 10 MG: 10 TABLET, FILM COATED ORAL at 20:02

## 2025-06-04 RX ADMIN — DOCUSATE SODIUM 100 MG: 100 CAPSULE, LIQUID FILLED ORAL at 07:38

## 2025-06-04 RX ADMIN — HYDROCODONE BITARTRATE AND ACETAMINOPHEN 1 TABLET: 5; 325 TABLET ORAL at 20:37

## 2025-06-04 RX ADMIN — HEPARIN SODIUM 5000 UNITS: 5000 INJECTION INTRAVENOUS; SUBCUTANEOUS at 21:30

## 2025-06-04 RX ADMIN — DONEPEZIL HYDROCHLORIDE 10 MG: 10 TABLET, FILM COATED ORAL at 07:38

## 2025-06-04 RX ADMIN — BUSPIRONE HYDROCHLORIDE 10 MG: 10 TABLET ORAL at 10:43

## 2025-06-04 RX ADMIN — ONDANSETRON 4 MG: 2 INJECTION INTRAMUSCULAR; INTRAVENOUS at 18:26

## 2025-06-04 RX ADMIN — Medication 10 MG: at 20:03

## 2025-06-04 RX ADMIN — ESCITALOPRAM OXALATE 10 MG: 10 TABLET ORAL at 10:43

## 2025-06-04 RX ADMIN — FERROUS SULFATE TAB 325 MG (65 MG ELEMENTAL FE) 325 MG: 325 (65 FE) TAB at 07:38

## 2025-06-04 RX ADMIN — SODIUM CHLORIDE, PRESERVATIVE FREE 10 ML: 5 INJECTION INTRAVENOUS at 20:38

## 2025-06-04 RX ADMIN — OXYCODONE HYDROCHLORIDE 10 MG: 10 TABLET ORAL at 07:51

## 2025-06-04 RX ADMIN — FAMOTIDINE 20 MG: 20 TABLET, FILM COATED ORAL at 07:38

## 2025-06-04 RX ADMIN — LIDOCAINE HYDROCHLORIDE 50 MG: 10 INJECTION, SOLUTION EPIDURAL; INFILTRATION; INTRACAUDAL; PERINEURAL at 18:17

## 2025-06-04 ASSESSMENT — PAIN DESCRIPTION - DESCRIPTORS
DESCRIPTORS: ACHING
DESCRIPTORS: ACHING;DISCOMFORT
DESCRIPTORS: ACHING;THROBBING
DESCRIPTORS: ACHING;THROBBING

## 2025-06-04 ASSESSMENT — PAIN DESCRIPTION - LOCATION
LOCATION: GENERALIZED

## 2025-06-04 ASSESSMENT — PAIN DESCRIPTION - ONSET: ONSET: ON-GOING

## 2025-06-04 ASSESSMENT — PAIN SCALES - GENERAL
PAINLEVEL_OUTOF10: 7
PAINLEVEL_OUTOF10: 4
PAINLEVEL_OUTOF10: 3
PAINLEVEL_OUTOF10: 4
PAINLEVEL_OUTOF10: 7
PAINLEVEL_OUTOF10: 6

## 2025-06-04 ASSESSMENT — PAIN DESCRIPTION - FREQUENCY: FREQUENCY: CONTINUOUS

## 2025-06-04 ASSESSMENT — PAIN - FUNCTIONAL ASSESSMENT
PAIN_FUNCTIONAL_ASSESSMENT: PREVENTS OR INTERFERES WITH ALL ACTIVE AND SOME PASSIVE ACTIVITIES
PAIN_FUNCTIONAL_ASSESSMENT: PREVENTS OR INTERFERES SOME ACTIVE ACTIVITIES AND ADLS
PAIN_FUNCTIONAL_ASSESSMENT: PREVENTS OR INTERFERES WITH MANY ACTIVE NOT PASSIVE ACTIVITIES

## 2025-06-04 ASSESSMENT — PAIN DESCRIPTION - ORIENTATION
ORIENTATION: LEFT
ORIENTATION: LEFT;RIGHT;UPPER;LOWER

## 2025-06-04 ASSESSMENT — LIFESTYLE VARIABLES: SMOKING_STATUS: 0

## 2025-06-04 ASSESSMENT — PAIN DESCRIPTION - PAIN TYPE: TYPE: ACUTE PAIN;CHRONIC PAIN

## 2025-06-04 NOTE — PROGRESS NOTES
This  visited with pt to follow up and provide spiritual care. Pt says he is having problems with his kidney's not working. Pt says he is Taoist and his ana maría is important to him. Pt's LW was faxed from [facility] and is now in his EMR, after this  requested a copy from their medical records. This  provided spiritual care with sustaining presence, support, and prayer. Pt expressed gratitude for spiritual care.       Spiritual Health History and Assessment/Progress Note  [facility]    Spiritual/Emotional Needs,  ,  ,      Name: Cholo Bhakta MRN: [id number]    Age: 69 y.o.     Sex: male   Language: English   Synagogue: Taoist   SANGEETA (acute kidney injury)     Date: 6/4/2025            Total Time Calculated: 10 min              Spiritual Assessment continued in [facility] 3 [facility]H/VAS/MED        Referral/Consult From: Palliative Care   Encounter Overview/Reason: Spiritual/Emotional Needs  Service Provided For: Patient    Ana María, Belief, Meaning:   Patient identifies as spiritual and is connected with a ana maría tradition or spiritual practice  Family/Friends No family/friends present      Importance and Influence:  Patient has spiritual/personal beliefs that influence decisions regarding their health  Family/Friends No family/friends present    Community:  Patient is connected with a spiritual community  Family/Friends No family/friends present    Assessment and Plan of Care:     Patient Interventions include: Provided sacramental/Hindu ritual  Family/Friends Interventions include: No family/friends present    Patient Plan of Care: Spiritual Care available upon further referral  Family/Friends Plan of Care: No family/friends present    Electronically signed by Mary Behrens, Chaplain on 6/4/2025 at 10:27 AM

## 2025-06-04 NOTE — PROGRESS NOTES
Pharmacy Renal Adjustment    Cholo Bhakta is a 69 y.o. male. Pharmacy has renally adjusted medications per protocol.    Recent Labs     06/02/25  0402 06/03/25  0402   * 115*       Recent Labs     06/02/25  0402 06/03/25  0402   CREATININE 5.3* 4.7*       Estimated Creatinine Clearance: 21 mL/min (A) (based on SCr of 4.7 mg/dL (H)).    Height:   Ht Readings from Last 1 Encounters:   06/03/25 1.829 m (6' 0.01\")     Weight:  Wt Readings from Last 1 Encounters:   06/03/25 125.3 kg (276 lb 4.8 oz)       Plan: Adjust the following medications based on renal function:           Famotidine to 20 mg PO daily    Electronically signed by SILVIO TORRES RPH on 6/4/2025 at 2:16 AM

## 2025-06-04 NOTE — PLAN OF CARE
Problem: Nutrition Deficit:  Goal: Optimize nutritional status  Recent Flowsheet Documentation  Taken 6/4/2025 1120 by Deidre Azevedo, MS, RD, LD  Nutrient intake appropriate for improving, restoring, or maintaining nutritional needs: Assess nutritional status and recommend course of action  6/3/2025 7326 by Suha Michaels, RN  Outcome: Progressing

## 2025-06-04 NOTE — PROGRESS NOTES
I agree with the history and physical exam in chart.  In addition, today's complete ROS was performed and the only positives are noted in the HPI.  I examined the patient preoperatively and discussed the surgery (tunneled hemodialysis catheter placement), including the risks, benefits, and alternatives to the patient and his wife/POA.  They seem to understand and agree to proceed.

## 2025-06-04 NOTE — ANESTHESIA POSTPROCEDURE EVALUATION
Department of Anesthesiology  Postprocedure Note    Patient: Cholo Bhakta  MRN: [id number]  YOB: 1955  Date of evaluation: 6/4/2025    Procedure Summary       Date: 06/04/25 Room / Location: [address]    Anesthesia Start: 1811 Anesthesia Stop:     Procedure: PERMCATH INSERTION Diagnosis:       Renal disease      (Renal disease [N28.9])    Surgeons: Aj Lyons MD Responsible Provider: Paulo Marc APRN - CRNA    Anesthesia Type: General ASA Status: 4            Anesthesia Type: General    Sarah Phase I: Sarah Score: 10    Sarah Phase II:      Anesthesia Post Evaluation    Patient location during evaluation: PACU  Patient participation: complete - patient participated  Level of consciousness: awake and alert  Pain score: 0  Airway patency: patent  Nausea & Vomiting: no vomiting and no nausea  Cardiovascular status: blood pressure returned to baseline and hemodynamically stable  Respiratory status: spontaneous ventilation, room air, nonlabored ventilation and acceptable  Hydration status: euvolemic  Comments: BP (!) 115/56   Pulse 65   Temp 97.2 °F (36.2 °C) (Temporal)   Resp 16   Ht 1.829 m (6' 0.01\")   Wt 125.3 kg (276 lb 4.8 oz)   SpO2 96%   BMI 37.46 kg/m²     Pain management: adequate    No notable events documented.

## 2025-06-04 NOTE — ANESTHESIA PRE PROCEDURE
daily   Yes Padmini Benavidez MD   busPIRone (BUSPAR) 10 MG tablet Take 1 tablet by mouth 3 times daily as needed 12/1/24  Yes Padmini Benavidez MD   ferrous sulfate (IRON 325) 325 (65 Fe) MG tablet Take 1 tablet by mouth 2 times daily 2/24/25  Yes Sixto Garner APRN - CNP   calcitRIOL (ROCALTROL) 0.25 MCG capsule Take 1 tablet by mouth daily 1/14/25  Yes Padmini Benavidez MD   donepezil (ARICEPT) 10 MG tablet Take 1 tablet by mouth daily 10/17/24  Yes Padmini Benavidez MD   oxyCODONE HCl (OXY-IR) 10 MG immediate release tablet Take 1 tablet by mouth 2 times daily as needed for Pain.   Yes Padmini Benavidez MD   pantoprazole (PROTONIX) 40 MG tablet Take 1 tablet by mouth daily 1/3/25  Yes Padmini Benavidez MD   potassium chloride (K-TAB) 20 MEQ TBCR extended release tablet Take 1 tablet by mouth daily   Yes Padmini Benavidez MD   pregabalin (LYRICA) 100 MG capsule Take 1 capsule by mouth 2 times daily. One capsule every morning and two capsules every evening before bed 1/19/25  Yes Padmini Benavidez MD   rosuvastatin (CRESTOR) 10 MG tablet Take 1 tablet by mouth at bedtime 1/3/25  Yes Padmini Benavidez MD   tamsulosin (FLOMAX) 0.4 MG capsule Take 1 tablet by mouth daily 10/29/24  Yes Padmini Benavidez MD   insulin lispro, 1 Unit Dial, (HUMALOG/ADMELOG) 100 UNIT/ML SOPN Inject 0-100 Units into the skin 3 times daily (before meals) Glucose: Dose:   No Insulin  180-249 2 Units  250-299 4 Units  300-349 6 Units  Over 349 8 Units 3/14/25 4/29/25  Ramu Esposito APRN - CNP   sodium hypochlorite (DAKINS) 0.125 % SOLN external solution Apply 1 Application topically 2 times daily 10/28/24   Padmini Benavidez MD   isosorbide dinitrate (ISORDIL) 10 MG tablet Take 1 tablet by mouth 3 times daily  Patient not taking: Reported on 6/2/2025 1/13/25   Padmini Benavidez MD       Current medications:    Current Facility-Administered Medications   Medication Dose Route Frequency

## 2025-06-04 NOTE — PROGRESS NOTES
Wound Follow-up Progress Note       NAME:  Cholo Bhakta  MEDICAL RECORD NUMBER:  [id number]  AGE:  69 y.o.   GENDER:  male  :  1955  TODAY'S DATE:  2025    Subjective    Wound Identification:  Wound Type: diabetic  Contributing Factors: diabetes and poor glucose control    Objective        Patient Goal of Care:  Wound Healing     BP (!) 115/56   Pulse 65   Temp 97.2 °F (36.2 °C)   Resp 18   Ht 1.829 m (6' 0.01\")   Wt 125.3 kg (276 lb 4.8 oz)   SpO2 96%   BMI 37.46 kg/m²   George Risk Score: George Scale Score: 14    Assessment    Measurements:  Negative Pressure Wound Therapy Foot Left;Plantar (Active)   Dressing Status Intact w/seal 25 1120   Canister changed? No 25 1120   Output (ml) 100 ml 25 0541   Unit Type ULTA 25 1120   Mode Intermittent 25 1120   Target Pressure (mmHg) 125 25 1120   Intensity Low 25 1120   $$ Dressing Changed & Charged $ Standard NPWT less than or equal to 50 sq cm PER TX 25 1120   Number of pieces placed 3 25 1120   Dressing Type Cleanse choice for open extraction 25 1120   Instillation Settings Instillation solution;Soak time (min);Instillation volume (ml);Therapy/Vacuum Time (min) 25 1303   Irrigation Solution Sodium chloride 0.9% 25 1120   Instillation Volume  8 mL 25 1120   Soak Time  5 minutes 25 1120   Therapy/Vacuum Time (hours) 2 25 1120   Dressing Change Due 25 1120   Number of days: 1       Wound 25 Left;Plantar;Lower Diabetic Ulcer #1 (Active)   Wound Etiology Diabetic 25 1120   Dressing Status New dressing applied 25 1120   Wound Cleansed Soap and water 25 1120   Dressing/Treatment Negative pressure wound therapy 25 1120   Offloading for Diabetic Foot Ulcers Offloading ordered 25 1120   Dressing Change Due 25 1120   Wound Length (cm) 4.1 cm 25 1120   Wound Width (cm) 4 cm  Full thickness 06/04/25 1120   Number of days: 2       Response to treatment:  Well tolerated by patient.     Pain Assessment:  Severity:  0 / 10  Quality of pain: N/A  Wound Pain Timing/Severity: none  Premedicated: N/A    Plan    Plan of Care: Wound 06/02/25 Buttocks Left;Right-Dressing/Treatment: Other (comment) (cavilon advanced)  Wound 02/21/25 Left;Plantar;Lower Diabetic Ulcer #1-Dressing/Treatment: Negative pressure wound therapy  Wound 06/02/25 Left;Plantar;Upper Diabetic Ulcer #2-Dressing/Treatment: Negative pressure wound therapy    Specialty Bed Required : N/A   Other      Current Diet: Diet NPO  Dietician consult:  N/A    Discharge Plan:  Placement for patient upon discharge: Skilled Nursing Facility SNF   Patient appropriate for Outpatient Wound Care Center: Yes    Referrals:  Other      Patient/Caregiver Teaching:  Level of patient/caregiver understanding able to:   Indicates understanding      Veraflo dressing changed to plantar left foot wounds. Granulation tissue noted in both wound beds with a small amount of bleeding present with foam removal. Patient tolerated well. Plan to transition to standard wound vac once SNF placement is obtained.            Electronically signed by Roselyn Garcia RN on 6/4/2025 at 11:25 AM

## 2025-06-04 NOTE — PROGRESS NOTES
Occupational Therapy Initial Assessment  Date: 2025   Patient Name: Cholo Bhakta  MRN: [id number]     : 1955    Date of Service: 2025    Discharge Recommendations:  24 hour supervision or assist, Patient would benefit from continued therapy after discharge       Assessment   Assessment: OT evaluation completed and tx initiated. Pt may benefit from continued skilled services to address functional deficits. Pt will likely progress with further tx. OT does not anticipate any environmental barriers to secondary location once medically cleared.  REQUIRES OT FOLLOW-UP: Yes  Activity Tolerance  Activity Tolerance: Patient Tolerated treatment well              Patient Diagnosis(es): The primary encounter diagnosis was Fall, initial encounter. Diagnoses of Open wound of left foot, initial encounter, Acute kidney injury superimposed on CKD, Altered mental status, unspecified altered mental status type, Pressure injury of skin of sacral region, unspecified injury stage, and Hypoglycemia were also pertinent to this visit.    Past Medical History:   Past Medical History:   Diagnosis Date    CHF (congestive heart failure) (HCC)     Chronic kidney disease     Diabetes mellitus (HCC)     Hyperlipidemia     Hypertension     Palliative care patient 2025        Past Surgical History:   Past Surgical History:   Procedure Laterality Date    CORONARY ARTERY BYPASS GRAFT                Restrictions  Restrictions/Precautions  Restrictions/Precautions: Fall Risk, Weight Bearing  Lower Extremity Weight Bearing Restrictions  Right Lower Extremity Weight Bearing: Non Weight Bearing (wound vac on)    Subjective      Pain Assessment  Pain Level: 7  Patient's Stated Pain Goal: 3  Pain Location: Generalized  Pain Orientation: Left;Right;Upper;Lower  Pain Descriptors: Aching;Throbbing  Functional Pain Assessment: Prevents or interferes with all active and some passive activities  Non-Pharmaceutical Pain Intervention(s): Emotional  assistance  Bed Mobility Comments: pt able to scoot up/down eob to switch out linens; did not stand  Transfers  Stand Step Transfers: Unable to assess  Sit to stand: Unable to assess  Stand to sit: Unable to assess  Transfer Comments: NWB to wound on RLE with wound vac on per nsg instructions     Cognition  Overall Cognitive Status: Exceptions  Arousal/Alertness: Delayed responses to stimuli  Following Commands: Follows one step commands with increased time;Follows one step commands with repetition  Safety Judgement: Decreased awareness of need for assistance  Problem Solving: Assistance required to identify errors made;Assistance required to correct errors made  Insights: Decreased awareness of deficits  Initiation: Requires cues for some  Sequencing: Requires cues for some               Gross Assessment  AROM: Generally decreased, functional  Strength: Generally decreased, functional                      Included Treatment  Tx consisted of: bed mobility; pt/family education; transfer training; DME training; activity tolerance/balance challenges; positioning.   (Treatment time: 30 min)       Education was provided on all DME used in this tx session.          Plan   Occupational Therapy Plan  Times Per Week: 3-5       Goals  Short Term Goals  Time Frame for Short Term Goals: 3-4 days  Short Term Goal 1: Transfer with min A and DME.  Short Term Goal 2: Toileting skills with mod A.  Short Term Goal 3: UBD and bathing with sup.  Short Term Goal 4: LBD and bathing with mod A and AE/DME.  Additional Goals?: Yes  Long Term Goals  Time Frame for Long Term Goals : 1 week  Long Term Goal 1: Pt/family will verbalize/demo: AE/DME options; surgical/WB precautions; recommended therapeutic activities; homemaking/IADL strategies; energy conservation techniques; and fall prevention strategies.          GERALDO Li/L  Electronically signed by Cleopatra CHOW/L on 6/4/2025 at 10:57 AM.

## 2025-06-04 NOTE — PROGRESS NOTES
Physical Therapy  Facility/Department: [facility] 3 [facility]NETH/VAS/MED  Physical Therapy Initial Assessment    Name: Cholo Bhakta  : 1955  MRN: [id number]  Date of Service: 2025    Discharge Recommendations:  Continue to assess pending progress, 24 hour supervision or assist, Patient would benefit from continued therapy after discharge          Patient Diagnosis(es): The primary encounter diagnosis was Fall, initial encounter. Diagnoses of Open wound of left foot, initial encounter, Acute kidney injury superimposed on CKD, Altered mental status, unspecified altered mental status type, Pressure injury of skin of sacral region, unspecified injury stage, and Hypoglycemia were also pertinent to this visit.  Past Medical History:  has a past medical history of CHF (congestive heart failure) (HCC), Chronic kidney disease, Diabetes mellitus (HCC), Hyperlipidemia, Hypertension, and Palliative care patient.  Past Surgical History:  has a past surgical history that includes Coronary artery bypass graft.    Assessment  Body Structures, Functions, Activity Limitations Requiring Skilled Therapeutic Intervention: Decreased functional mobility ;Decreased ADL status;Decreased ROM;Decreased body mechanics;Decreased sensation;Decreased endurance;Decreased safe awareness;Decreased cognition;Decreased balance;Increased pain;Decreased posture  Assessment: Pt. able to participate in therapy today, despite feeling poorly. Pt. needs 24 hr care. Anticipate pt will need additional therapy upon d/c. Pt. anticipating permacath placement and vascular surgery later today. Will progress with mobility as able. He has large wounds on plantar surface of L foot, so wound care RN recommends he try to avoid WB if possible. HD may limit pt's ability to participate in therapy.  Treatment Diagnosis: impaired gait and mobility  Therapy Prognosis: Fair  Decision Making: Medium Complexity  Barriers to Learning: pt not feeling well, pain  Requires PT Follow-Up:  (degrees)  LLE AROM : Exceptions  LLE General AROM: 0-90 knee and hip, ankle to neutral DF  Strength RLE  Strength RLE: Exception  Comment: grossly 3-/5  Strength LLE  Strength LLE: Exception  Comment: grossly 3-/5           Bed mobility  Supine to Sit: Substantial/Maximal assistance;2 Person assistance  Sit to Supine: Substantial/Maximal assistance;Partial/Moderate assistance  Scooting: Contact guard assistance  Bed Mobility Comments: pt able to scoot up/down eob to switch out linens; did not fully stand  Transfers  Sit to Stand: Minimal Assistance;2 Person Assistance (did not come to full standing, likely putting too much weight down on LLE)  Stand to Sit: Minimal Assistance;2 Person Assistance  Ambulation  Comments: unable at this time     Balance  Posture: Fair  Sitting - Static: Fair;+  Sitting - Dynamic: Fair;-  Standing - Static: Poor  Standing - Dynamic: Poor;-          OutComes Score                                                  AM-PAC - Mobility              Tinneti Score       Goals  Short Term Goals  Time Frame for Short Term Goals: 2 wks  Short Term Goal 1: supine to sit indep  Short Term Goal 2: sit to stand indep  Short Term Goal 3: bed to chair SBA RW  Patient Goals   Patient Goals : go home       Education  Patient Education  Education Given To: Patient;Family  Education Provided: Role of Therapy;Plan of Care;Mobility Training;Transfer Training;Fall Prevention Strategies  Education Provided Comments: use of call light, staff A  Education Method: Demonstration;Verbal  Barriers to Learning: Cognition  Education Outcome: Continued education needed      Therapy Time   Individual Concurrent Group Co-treatment   Time In           Time Out           Minutes                   Clarissa Be, PT     Electronically signed by Clarissa Be PT on 6/4/2025 at 2:06 PM

## 2025-06-04 NOTE — PROGRESS NOTES
[facility] Hospitalists      Patient:  Cholo Bhakta  YOB: 1955  Date of Service: 6/4/2025  MRN: [id number]   Acct: [id number]   Primary Care Physician: Nirmal Nuno MD  Advance Directive: Full Code  Admit Date: 6/2/2025       Hospital Day: 2  Portions of this note have been copied forward, however, changed to reflect the most current clinical status of this patient    CHIEF COMPLAINT fall    SUBJECTIVE: insomnia overnight, resting comfortably in bed. Anxious about procedure today discussed with patient and family     Cumulative hospital course   69-year-old male with HTN, hyperlipidemia, type II DM, CKD 4, chronic AF on anticoagulation, chronic systolic heart failure, CAD s/p CABG 2015, with complaints of fall.  Patient unable to provide HPI obtained earlier from spouse at bedside.  Spouse reported that patient not been feeling well ongoing for the past week.  Does have chronic wounds to sacral area and left that he follows with outpatient wound care in [place].  Has been following closely with cardiology recently seen on 5/20 and recently increased Bumex to 3 mg twice daily along with Zaroxolyn after complaints of increased weight gain and dyspnea.  Have discussed with nephrology past regarding dialysis and at that time he had declined.  Workup in ER CT head no acute intracranial abnormality, CT cervical/lumbar spine no acute osseous abnormality, CT pelvis no acute findings, CXR no acute cardiopulmonary process, creatinine 5.3 .  Patient admitted to hospitalist service with consultation to nephrology and podiatry.  Chronic appearing left foot wound along with coccyx wound we will consult wound care and monitor off antibiotics. Podiatry evaluated no acute infection, will remain on wound vac and follow up with DR Heaton left chronic foot ulcer. After further discussion with spouse and patient agreeable to dialysis. Vascular consulted and plan for permacath placement this afternoon. Has had  troponin secondary to underlying renal disease along with chronic AF   -resumed Isordil, no recurrent issues of chest pain  - ECHO from March reviewed   - Trend troponin 144 with repeat 137  - Serial and PRN EKGs  - Monitor on telemetry   Chronic obstructive pulmonary disease    No signs of exacerbation   Atrial fibrillation    Currently rate controlled    Toprol XL    Monitor on telemetry   Essential hypertension   Currently 115/56   Monitor vital signs    Memory loss   Improving   Aricept     DVT Prophylaxis: Heparin subcutaneously       Ramu Esposito, APRN - CNP, 6/4/2025 1:23 PM

## 2025-06-04 NOTE — CONSULTS
Vascular Surgery Consultation     Reason for Consultation    Request for tunneled dialysis catheter placement    HPI    Cholo Bhakta is a 69-year-old  man with history of chronic kidney disease and atherosclerosis native arteries bilateral lower extremities with left heel and foot ulceration.  The patient was actually seen in my clinic for his vascular occlusive disease to consider arteriogram.  The patient was hesitant because of his chronic kidney disease.  However, he has progressed to end-stage renal disease before the arteriogram so I have been requested to place a tunneled dialysis catheter to initiate hemodialysis.    Lab Results   Component Value Date    CREATININE 4.7 (H) 06/03/2025     (H) 06/03/2025     06/03/2025    K 3.5 06/03/2025    CL 93 (L) 06/03/2025    CO2 28 06/03/2025         History    Cholo Bhakta is a 69 y.o. male with the following history reviewed and recorded in [facility]:  Patient Active Problem List    Diagnosis Date Noted    Severe malnutrition 06/03/2025    Fall 06/03/2025    SANGEETA (acute kidney injury) 06/02/2025    Acute on chronic systolic CHF (congestive heart failure) ([facility]) 04/02/2025    Palliative care patient 03/07/2025    Acute on chronic congestive heart failure, unspecified heart failure type ([facility]) 03/06/2025    Gout 03/06/2025    Memory loss 03/06/2025    GERD (gastroesophageal reflux disease) 03/06/2025    BPH (benign prostatic hyperplasia) 03/06/2025    Neuropathy 03/06/2025    Diabetes mellitus ([facility]) 02/22/2025    CHF, left ventricular ([facility]) 02/21/2025    Acute on chronic systolic heart failure ([facility]) 02/21/2025    Volume overload 02/21/2025    CKD (chronic kidney disease) stage 4, GFR 15-29 ml/min ([facility]) 02/21/2025    Cardiorenal syndrome 02/21/2025    Chronic obstructive pulmonary disease ([facility]) 12/03/2024    CAD (coronary artery disease) 01/20/2023    Diabetic ulcer of left foot associated with type 2 diabetes mellitus ([facility]) 08/31/2020    Essential  surgery will be around 1630  Risks have been discussed with the patient including bleeding, infection, thrombosis, arrhythmias, pneumothorax, arterial injury, nerve injury.  He seems to understand and agrees to proceed.

## 2025-06-04 NOTE — PROGRESS NOTES
Cardiology Progress Note Vladimir Cohen MD      Patient:  Cholo Bhakta  [id number]    Patient Active Problem List    Diagnosis Date Noted    Acute on chronic systolic CHF (congestive heart failure) ([facility]) 04/02/2025     Priority: Low    Palliative care patient 03/07/2025     Priority: Low    Acute on chronic congestive heart failure, unspecified heart failure type ([facility]) 03/06/2025     Priority: Low    Gout 03/06/2025     Priority: Low    Memory loss 03/06/2025     Priority: Low    GERD (gastroesophageal reflux disease) 03/06/2025     Priority: Low    BPH (benign prostatic hyperplasia) 03/06/2025     Priority: Low    Neuropathy 03/06/2025     Priority: Low    Diabetes mellitus ([facility]) 02/22/2025     Priority: Low    CHF, left ventricular ([facility]) 02/21/2025     Priority: Low    Acute on chronic systolic heart failure ([facility]) 02/21/2025     Priority: Low    Volume overload 02/21/2025     Priority: Low    CKD (chronic kidney disease) stage 4, GFR 15-29 ml/min ([facility]) 02/21/2025     Priority: Low    Cardiorenal syndrome 02/21/2025     Priority: Low    Chronic obstructive pulmonary disease ([facility]) 12/03/2024     Priority: Low    CAD (coronary artery disease) 01/20/2023     Priority: Low    Diabetic ulcer of left foot associated with type 2 diabetes mellitus ([facility]) 08/31/2020     Priority: Low    Essential hypertension 05/03/2017     Priority: Low    Severe malnutrition 06/03/2025    Fall 06/03/2025    SANGEETA (acute kidney injury) 06/02/2025       Admit Date:  6/2/2025    Admission Problem List: Present on Admission:   SANGEETA (acute kidney injury)   CKD (chronic kidney disease) stage 4, GFR 15-29 ml/min ([facility])   Chronic obstructive pulmonary disease (HCC)   Essential hypertension   Diabetes mellitus (HCC)   CAD (coronary artery disease)   Diabetic ulcer of left foot associated with type 2 diabetes mellitus ([facility])   Memory loss   Severe malnutrition   Fall      Cardiac Specific Data:  Specialty Problems          Cardiology Problems    Essential  hypertension        CAD (coronary artery disease)        Acute on chronic systolic heart failure (HCC)        Cardiorenal syndrome        CHF, left ventricular (HCC)        Acute on chronic congestive heart failure, unspecified heart failure type (HCC)        Acute on chronic systolic CHF (congestive heart failure) ([facility])         1.  Coronary artery disease, prior CABG 9/2015 with four-vessel grafting, last catheterization 2/8/2021 with occlusive native vessel disease, patent grafts with LIMA to LAD, SVG to diagonal, SVG to OM, SVG to RCA, prior LV ejection fraction reported at 50%, drop in EF 1/8/2025 to 35-40% (all records from [facility]), echo 3/8/2025 with EF 45-50%.  2.  Morbid obesity.  3.  Diabetes mellitus type 2 with diabetic foot ulcer.  4.  CKD stage IV/5.  5.  Chronic atrial fibrillation on Eliquis.  6.  Remote tobacco use quit 1991.     Subjective:  Mr. Bhakta remains lucid overnight but remains bedbound.  Leg swelling has decreased.  Creatinine scheduled for permacath today.  Creatinine and BUN slightly lower.  Objective:   BP (!) 115/56   Pulse 65   Temp 97.2 °F (36.2 °C)   Resp 18   Ht 1.829 m (6' 0.01\")   Wt 125.3 kg (276 lb 4.8 oz)   SpO2 96%   BMI 37.46 kg/m²       Intake/Output Summary (Last 24 hours) at 6/4/2025 1206  Last data filed at 6/4/2025 0541  Gross per 24 hour   Intake --   Output 800 ml   Net -800 ml       Prior to Admission medications    Medication Sig Start Date End Date Taking? Authorizing Provider   metoprolol succinate (TOPROL XL) 50 MG extended release tablet Take 1 tablet by mouth daily 5/13/25  Yes Scott Wilburn APRN   bumetanide (BUMEX) 2 MG tablet Take 1.5 tablets by mouth 2 times daily   Yes Provider, MD Padmini   metOLazone (ZAROXOLYN) 5 MG tablet Take 1 tablet by mouth Every Monday, Wednesday, and Friday  Patient taking differently: Take 1 tablet by mouth daily 4/7/25  Yes Brandee Forrest APRN - CNP   Ergocalciferol (VITAMIN D) 32983 units CAPS Take 50,000 Units

## 2025-06-04 NOTE — PROGRESS NOTES
Nephrology ([facility]) Progress Note    Patient:  Cholo Bhakta  YOB: 1955  Date of Service: 6/4/2025  MRN: [id number]   Acct: [id number]   Primary Care Physician: Nirmal Nuno MD  Advance Directive: Full Code  Admit Date: 6/2/2025       Hospital Day: 2  Referring Provider: Adrián Benz MD    Patient independently seen and examined, Chart, Consults, Notes, Operative notes, Labs, Cardiology, and Radiology studies reviewed as available.    Chief complaint: Abnormal labs.    Subjective:  Cholo Bhakta is a 69 y.o. male for whom we were consulted for evaluation and treatment of chronic kidney disease/progressed to end-stage renal disease.  Patient has multiple comorbid condition including morbid abdominal obesity, coronary artery disease, CABG, peripheral vascular disease, CVA, congestive heart failure, diabetic foot ulcer and hypertension.  He follows up in the office.  He has progressive decline in renal function however has refused dialysis treatment previously.  Presented to the emergency room complaining of fall, profound fatigue, lack of energy, sleepiness with poor appetite.  His wife tells me that he remains in the bed most of the time.  Routine lab data indicated serum creatinine now 5.1 mg with blood urea nitrogen more than 100 mg.  He now agreed to proceed with dialysis.  Patient also had pending arteriogram/runoff for the treatment of peripheral vascular disease affecting his bilateral lower extremities.    This morning patient is still very weak but denies any shortness of breath.  He is scheduled to have permacatheter placement today    Allergies:  Cefepime, Bactrim [sulfamethoxazole-trimethoprim], and Metronidazole    Medicines:  Current Facility-Administered Medications   Medication Dose Route Frequency Provider Last Rate Last Admin    famotidine (PEPCID) tablet 20 mg  20 mg Oral Daily Ramu Esposito, CORI - CNP   20 mg at 06/04/25 0738    escitalopram  ______________________________________ Electronically signed by: MICHAEL SMITH M.D. Date:     06/02/2025 Time:    05:21     CT CERVICAL SPINE WO CONTRAST  Result Date: 6/2/2025  CT CERVICAL SPINE WITHOUT CONTRAST  HISTORY:  Status post fall  TECHNIQUE:  CT of the cervical spine with multiplanar reformations.  FINDINGS:  Reformatted images demonstrate normal alignment with preservation of vertebral body height. Prior fusion C5 - C7.  No fracture seen on the axial or reformatted images. No acute surrounding soft tissue abnormalitites. Lung apices are clear.      No acute findings in the cervical spine.  All CT scans are performed using dose optimization techniques as appropriate to the performed exam and include at least one of the following: Automated exposure control, adjustment of the mA and/or kV according to size, and the use of iterative reconstruction technique.  ______________________________________ Electronically signed by: MICHAEL SMITH M.D. Date:     06/02/2025 Time:    05:19        Assessment   1.  CKD/progressed to end-stage renal disease.  2.  Uremic manifestation.  3.  Type 2 diabetes with renal failure.  4.  History of peripheral vascular disease.  5.  Status post recent fall.  6.  Anemia of chronic kidney disease.  7.  Secondary hyperparathyroidism.  8.  Diabetic foot ulcer.  9.  Recurrent hyponatremia related to CHF and CKD      Plan:  1.  Patient is scheduled to have permacatheter placement today followed by first hemodialysis treatment.  He will need outpatient dialysis set up closer to his house.  2.  Patient will also need arteriogram of left lower extremity.  3.  Resume JOSE as needed.  4.  Plan was discussed extensively with his wife and patient.    Shailesh Freed MD  06/04/25  10:04 AM

## 2025-06-05 LAB
ALBUMIN SERPL-MCNC: 3.3 G/DL (ref 3.5–5.2)
ALP SERPL-CCNC: 134 U/L (ref 40–129)
ALT SERPL-CCNC: 13 U/L (ref 10–50)
ANION GAP SERPL CALCULATED.3IONS-SCNC: 15 MMOL/L (ref 8–16)
AST SERPL-CCNC: 31 U/L (ref 10–50)
BASOPHILS # BLD: 0.1 K/UL (ref 0–0.2)
BASOPHILS NFR BLD: 0.8 % (ref 0–1)
BILIRUB SERPL-MCNC: 0.5 MG/DL (ref 0.2–1.2)
BUN SERPL-MCNC: 102 MG/DL (ref 8–23)
CALCIUM SERPL-MCNC: 9.3 MG/DL (ref 8.8–10.2)
CHLORIDE SERPL-SCNC: 96 MMOL/L (ref 98–107)
CO2 SERPL-SCNC: 29 MMOL/L (ref 22–29)
CREAT SERPL-MCNC: 4.1 MG/DL (ref 0.7–1.2)
EOSINOPHIL # BLD: 0.4 K/UL (ref 0–0.6)
EOSINOPHIL NFR BLD: 5.8 % (ref 0–5)
ERYTHROCYTE [DISTWIDTH] IN BLOOD BY AUTOMATED COUNT: 14.1 % (ref 11.5–14.5)
GLUCOSE BLD-MCNC: 118 MG/DL (ref 70–99)
GLUCOSE BLD-MCNC: 122 MG/DL (ref 70–99)
GLUCOSE BLD-MCNC: 232 MG/DL (ref 70–99)
GLUCOSE SERPL-MCNC: 116 MG/DL (ref 70–99)
HCT VFR BLD AUTO: 36.3 % (ref 42–52)
HGB BLD-MCNC: 11.9 G/DL (ref 14–18)
IMM GRANULOCYTES # BLD: 0 K/UL
LYMPHOCYTES # BLD: 0.9 K/UL (ref 1.1–4.5)
LYMPHOCYTES NFR BLD: 15.1 % (ref 20–40)
MAGNESIUM SERPL-MCNC: 3.4 MG/DL (ref 1.6–2.4)
MCH RBC QN AUTO: 29 PG (ref 27–31)
MCHC RBC AUTO-ENTMCNC: 32.8 G/DL (ref 33–37)
MCV RBC AUTO: 88.3 FL (ref 80–94)
MONOCYTES # BLD: 0.9 K/UL (ref 0–0.9)
MONOCYTES NFR BLD: 14.5 % (ref 0–10)
NEUTROPHILS # BLD: 3.8 K/UL (ref 1.5–7.5)
NEUTS SEG NFR BLD: 63.5 % (ref 50–65)
PERFORMED ON: ABNORMAL
PLATELET # BLD AUTO: 207 K/UL (ref 130–400)
PMV BLD AUTO: 12.8 FL (ref 9.4–12.4)
POTASSIUM SERPL-SCNC: 3.5 MMOL/L (ref 3.5–5)
POTASSIUM SERPL-SCNC: 3.5 MMOL/L (ref 3.5–5.1)
PROT SERPL-MCNC: 6.9 G/DL (ref 6.4–8.3)
RBC # BLD AUTO: 4.11 M/UL (ref 4.7–6.1)
SODIUM SERPL-SCNC: 140 MMOL/L (ref 136–145)
WBC # BLD AUTO: 6 K/UL (ref 4.8–10.8)

## 2025-06-05 PROCEDURE — 85025 COMPLETE CBC W/AUTO DIFF WBC: CPT

## 2025-06-05 PROCEDURE — 6370000000 HC RX 637 (ALT 250 FOR IP): Performed by: INTERNAL MEDICINE

## 2025-06-05 PROCEDURE — 36415 COLL VENOUS BLD VENIPUNCTURE: CPT

## 2025-06-05 PROCEDURE — 2500000003 HC RX 250 WO HCPCS: Performed by: SURGERY

## 2025-06-05 PROCEDURE — 6370000000 HC RX 637 (ALT 250 FOR IP): Performed by: SURGERY

## 2025-06-05 PROCEDURE — 82962 GLUCOSE BLOOD TEST: CPT

## 2025-06-05 PROCEDURE — 1200000000 HC SEMI PRIVATE

## 2025-06-05 PROCEDURE — 6360000002 HC RX W HCPCS: Performed by: SURGERY

## 2025-06-05 PROCEDURE — 8010000000 HC HEMODIALYSIS ACUTE INPT

## 2025-06-05 PROCEDURE — 6360000002 HC RX W HCPCS: Performed by: INTERNAL MEDICINE

## 2025-06-05 PROCEDURE — 99232 SBSQ HOSP IP/OBS MODERATE 35: CPT | Performed by: INTERNAL MEDICINE

## 2025-06-05 PROCEDURE — 2500000003 HC RX 250 WO HCPCS: Performed by: ANESTHESIOLOGY

## 2025-06-05 PROCEDURE — 80053 COMPREHEN METABOLIC PANEL: CPT

## 2025-06-05 PROCEDURE — 94760 N-INVAS EAR/PLS OXIMETRY 1: CPT

## 2025-06-05 PROCEDURE — 83735 ASSAY OF MAGNESIUM: CPT

## 2025-06-05 RX ORDER — ISOSORBIDE DINITRATE 10 MG/1
20 TABLET ORAL 3 TIMES DAILY
Status: DISCONTINUED | OUTPATIENT
Start: 2025-06-05 | End: 2025-06-14 | Stop reason: HOSPADM

## 2025-06-05 RX ADMIN — INSULIN LISPRO 2 UNITS: 100 INJECTION, SOLUTION INTRAVENOUS; SUBCUTANEOUS at 20:42

## 2025-06-05 RX ADMIN — DONEPEZIL HYDROCHLORIDE 10 MG: 10 TABLET, FILM COATED ORAL at 11:36

## 2025-06-05 RX ADMIN — PANTOPRAZOLE SODIUM 40 MG: 40 TABLET, DELAYED RELEASE ORAL at 11:36

## 2025-06-05 RX ADMIN — ISOSORBIDE DINITRATE 20 MG: 10 TABLET ORAL at 16:48

## 2025-06-05 RX ADMIN — PREGABALIN 50 MG: 50 CAPSULE ORAL at 11:36

## 2025-06-05 RX ADMIN — OXYCODONE HYDROCHLORIDE 10 MG: 10 TABLET ORAL at 12:09

## 2025-06-05 RX ADMIN — ISOSORBIDE DINITRATE 10 MG: 10 TABLET ORAL at 11:35

## 2025-06-05 RX ADMIN — SODIUM CHLORIDE, PRESERVATIVE FREE 10 ML: 5 INJECTION INTRAVENOUS at 11:37

## 2025-06-05 RX ADMIN — METOPROLOL SUCCINATE 50 MG: 25 TABLET, FILM COATED, EXTENDED RELEASE ORAL at 11:36

## 2025-06-05 RX ADMIN — BUSPIRONE HYDROCHLORIDE 10 MG: 10 TABLET ORAL at 23:17

## 2025-06-05 RX ADMIN — PREGABALIN 50 MG: 50 CAPSULE ORAL at 20:42

## 2025-06-05 RX ADMIN — MORPHINE SULFATE 2 MG: 2 INJECTION, SOLUTION INTRAMUSCULAR; INTRAVENOUS at 20:47

## 2025-06-05 RX ADMIN — HEPARIN SODIUM 3600 UNITS: 1000 INJECTION INTRAVENOUS; SUBCUTANEOUS at 10:57

## 2025-06-05 RX ADMIN — HEPARIN SODIUM 5000 UNITS: 5000 INJECTION INTRAVENOUS; SUBCUTANEOUS at 20:43

## 2025-06-05 RX ADMIN — FERROUS SULFATE TAB 325 MG (65 MG ELEMENTAL FE) 325 MG: 325 (65 FE) TAB at 11:36

## 2025-06-05 RX ADMIN — ROSUVASTATIN 10 MG: 10 TABLET, FILM COATED ORAL at 20:42

## 2025-06-05 RX ADMIN — FERROUS SULFATE TAB 325 MG (65 MG ELEMENTAL FE) 325 MG: 325 (65 FE) TAB at 20:42

## 2025-06-05 RX ADMIN — ONDANSETRON 4 MG: 2 INJECTION INTRAMUSCULAR; INTRAVENOUS at 15:01

## 2025-06-05 RX ADMIN — SODIUM CHLORIDE, PRESERVATIVE FREE 10 ML: 5 INJECTION INTRAVENOUS at 20:50

## 2025-06-05 RX ADMIN — HYDROCODONE BITARTRATE AND ACETAMINOPHEN 2 TABLET: 5; 325 TABLET ORAL at 16:48

## 2025-06-05 RX ADMIN — TAMSULOSIN HYDROCHLORIDE 0.4 MG: 0.4 CAPSULE ORAL at 11:36

## 2025-06-05 RX ADMIN — DOCUSATE SODIUM 100 MG: 100 CAPSULE, LIQUID FILLED ORAL at 11:36

## 2025-06-05 RX ADMIN — INSULIN GLARGINE 10 UNITS: 100 INJECTION, SOLUTION SUBCUTANEOUS at 20:43

## 2025-06-05 RX ADMIN — Medication 10 MG: at 20:42

## 2025-06-05 RX ADMIN — BUSPIRONE HYDROCHLORIDE 10 MG: 10 TABLET ORAL at 16:48

## 2025-06-05 RX ADMIN — DOCUSATE SODIUM 100 MG: 100 CAPSULE, LIQUID FILLED ORAL at 20:42

## 2025-06-05 RX ADMIN — INSULIN GLARGINE 10 UNITS: 100 INJECTION, SOLUTION SUBCUTANEOUS at 11:36

## 2025-06-05 RX ADMIN — ESCITALOPRAM OXALATE 10 MG: 10 TABLET ORAL at 11:36

## 2025-06-05 RX ADMIN — FAMOTIDINE 20 MG: 20 TABLET, FILM COATED ORAL at 11:35

## 2025-06-05 RX ADMIN — CALCITRIOL CAPSULES 0.25 MCG 0.25 MCG: 0.25 CAPSULE ORAL at 11:35

## 2025-06-05 RX ADMIN — HEPARIN SODIUM 5000 UNITS: 5000 INJECTION INTRAVENOUS; SUBCUTANEOUS at 15:01

## 2025-06-05 ASSESSMENT — PAIN DESCRIPTION - LOCATION
LOCATION: ABDOMEN
LOCATION: BACK
LOCATION: BACK;NECK

## 2025-06-05 ASSESSMENT — PAIN SCALES - GENERAL
PAINLEVEL_OUTOF10: 3
PAINLEVEL_OUTOF10: 6
PAINLEVEL_OUTOF10: 4
PAINLEVEL_OUTOF10: 7
PAINLEVEL_OUTOF10: 0
PAINLEVEL_OUTOF10: 7

## 2025-06-05 ASSESSMENT — PAIN DESCRIPTION - ORIENTATION
ORIENTATION: MID
ORIENTATION: MID;LOWER

## 2025-06-05 ASSESSMENT — PAIN DESCRIPTION - DESCRIPTORS
DESCRIPTORS: ACHING;DISCOMFORT
DESCRIPTORS: ACHING;DISCOMFORT
DESCRIPTORS: SHARP

## 2025-06-05 NOTE — OP NOTE
Preoperative Diagnosis:    1. ESRD requiring hemodialysis     Postoperative Diagnosis:  Same    Operative Procedure:    1.  Ultrasound guided cannulation of right internal jugular vein   2.  Placement of right internal jugular vein tunneled dialysis catheter (Bard Glidepath 23 cm tip to cuff).     Surgeon:  Aj Lyons MD    Anesthesia:  Local and Moderate Sedation    EBL:  Less than 50 ml    Findings:  1.  The right internal jugular vein is patent.  2.  The dialysis catheter tips are in the right atrium/superior vena cava junction.    Procedure in Detail:    After the patient was consented, and given intravenous antibiotics, the patient was brought to the operating room and placed on the table in the supine position.   General  anesthesia was administered and the patient's bilateral neck and chest were prepped and draped in the usual sterile fashion.      Skin and subcutaneous tissues over the internal jugular vein were anesthetized with licocaine.  The right internal jugular vein was cannulated under ultrasound guidance with a micropuncture needle.  Seldinger technique was utilized to place an 0.035 wire into the inferior vena cava under fluoroscopic visualization.      The right neck and chest were anesthetized with lidocaine.  An incision was made in the right neck over the wire with knife.  A separate incision was made in the right chest with knife.  The catheter was tunneled from the chest to the neck incision.  Dilators were passed over the wire under fluoroscopic visualization.  A dilator/tear-away sheath was placed over the wire under fluoroscopic visualization and the dilator and wire were removed.  The catheter was placed through the tear-away sheath and down to the right atrium under fluoroscopic visualization.  The tear-away sheath was removed and the catheter was withdrawn until the tips were in the superior vena cava/right atrial junction.      The right neck wound was closed in layers with 3-0  vicryl subcutaneous, 3-0 nylon sutures and dermabond skin adhesive.  The exit site was secured around the catheter with 3-0 vicryl subcutaneous purstring suture.  The catheter itself was secured to the chest with two 2-0 nylon sutures.  Sterile dressings were placed.  Both ports of the catheter aspirated and flushed easily with heparinized saline and heparin lock.    The catheter is ready for use.

## 2025-06-05 NOTE — CARE COORDINATION
Followed up with pts spouse this am about dc planning.  Spoke with Roselyn CARDOSO about pt wound care needs. Pt will benefit from having a continued wound vac upon dc.  Spouse aware and wants a referral to [facility].  Pt has been there in the past.  Will also work on getting pt outpt HD set up.   Referral sent to [facility] [place]  [place]   P   F  Electronically signed by Evie Carroll RN on 6/5/2025 at 1:14 PM

## 2025-06-05 NOTE — PROGRESS NOTES
06/05/25 1500   Subjective   Subjective Attempt.  Patient moved from 320 to 308.  Patient not appropriate for therapy today.   PT Plan of Care   Thursday D       Electronically signed by Maycol Garcia PTA on 6/5/2025 at 3:10 PM

## 2025-06-05 NOTE — PROGRESS NOTES
[facility] Hospital[facility]      Patient:  Cholo Bhakta  YOB: 1955  Date of Service: 6/5/2025  MRN: [id number]   Acct: [id number]   Primary Care Physician: Nirmal Nuno MD  Advance Directive: Full Code  Admit Date: 6/2/2025       Hospital Day: 3  Portions of this note have been copied forward, however, changed to reflect the most current clinical status of this patient    CHIEF COMPLAINT fall    SUBJECTIVE: no issues overnight, seen in dialysis    Cumulative hospital course   69-year-old male with HTN, hyperlipidemia, type II DM, CKD 4, chronic AF on anticoagulation, chronic systolic heart failure, CAD s/p CABG 2015, with complaints of fall.  Patient unable to provide HPI obtained earlier from spouse at bedside.  Spouse reported that patient not been feeling well ongoing for the past week.  Does have chronic wounds to sacral area and left that he follows with outpatient wound care in [place].  Has been following closely with cardiology recently seen on 5/20 and recently increased Bumex to 3 mg twice daily along with Zaroxolyn after complaints of increased weight gain and dyspnea.  Have discussed with nephrology past regarding dialysis and at that time he had declined.  Workup in ER CT head no acute intracranial abnormality, CT cervical/lumbar spine no acute osseous abnormality, CT pelvis no acute findings, CXR no acute cardiopulmonary process, creatinine 5.3 .  Patient admitted to hospitalist service with consultation to nephrology and podiatry.  Chronic appearing left foot wound along with coccyx wound we will consult wound care and monitor off antibiotics. Podiatry evaluated no acute infection, will remain on wound vac and follow up with DR Heaton left chronic foot ulcer. After further discussion with spouse and patient agreeable to dialysis. Vascular consulted and plan for permacath placement 6/4. Has had intermittent chest pain at rest, EKG reviewed no acute ischemic changes. Troponin 144, 137.

## 2025-06-05 NOTE — DIALYSIS
Pt tolerated first HD tx well. 1.6 L removed. Pt stable.      [facility]  DIALYSIS TREATMENT SUMMARY      Note: Consult with the attending physician for patient treatment orders, this document is not a physician order.      Patient Information   Patient Cholo Bhakta   Date of Birth June 18, 1955   Chart Number [id number]   [facility] MRN [id number]   Gender Male   SSN (last 4) [id number]     Treatment Information   Treatment Type Hemodialysis   Treatment Id [id number]   Start Time June 05, 2025 07:54   End Time June 05, 2025 10:57   Acutal Duration 03:03     Treatment Balances   Total Saline Administered 500   Net Fluid Balance -1600   Reason Goal Not Met Hypotension - MD Aware    Hemodialysis Orders   Therapy Standard   Orders Verified Time 06/05/2025 07:42    Date Verified 06/05/2025   Duration 3:00   Isolated UF/Bypass No   BFR (mL) 300   DFR (mL) 600   Dialyzer Type OPTIFLUX 160NR   UF Order UF Goal Ordered   UF Goal Ordered (mL) 2000   With BP Parameters Yes   Crit-Line used No   Heparin Initial Units Bolus No   Heparin IV Maintenance Bolus No   Heparin IV Infusion No   Potassium (mEq/L) 2   Calcium (mEq/L) 2.5   Bicarb (mg/dL) 35   Sodium (mEq/L) 137   Clinician Laurie Garcia RN    Dialysis Access   Access Type Central Venous Catheter   Central Venous Catheter   Access Type Catheter - Tunneled   Date Central Venous Catheter Placed 06/04/2025   Access Location Chest Wall - R   Surgeon Dr. Lyons   1st Use Catheter Verified by Fluoroscopy   Catheter Care Completed per Policy Yes   Dressing Dry and Intact on Arrival No - Dressing Saturated   Dressing Changed Yes   Type of Dressing Film Biopatch/CHG   Dressing Changed By [facility] Staff   Type of HD Caps Curos   HD Caps Changed Yes   CVC Line Education Provided Yes - Dressing Change Frequency      Vitals   Pre-Treatment Vitals   Time Is BP being recorded? Pre BP Map BP Method Pulse RR Temp How was Weight Obtained? Pre

## 2025-06-05 NOTE — PLAN OF CARE
Problem: Nutrition Deficit:  Goal: Optimize nutritional status  Outcome: Progressing     Problem: Chronic Conditions and Co-morbidities  Goal: Patient's chronic conditions and co-morbidity symptoms are monitored and maintained or improved  Outcome: Progressing     Problem: Discharge Planning  Goal: Discharge to home or other facility with appropriate resources  Outcome: Progressing     Problem: Safety - Adult  Goal: Free from fall injury  Outcome: Progressing     Problem: Skin/Tissue Integrity  Goal: Skin integrity remains intact  Description: 1.  Monitor for areas of redness and/or skin breakdown2.  Assess vascular access sites hourly3.  Every 4-6 hours minimum:  Change oxygen saturation probe site4.  Every 4-6 hours:  If on nasal continuous positive airway pressure, respiratory therapy assess nares and determine need for appliance change or resting period  Outcome: Progressing     Problem: Pain  Goal: Verbalizes/displays adequate comfort level or baseline comfort level  Outcome: Progressing     Problem: Neurosensory - Adult  Goal: Achieves stable or improved neurological status  Outcome: Progressing     Problem: Respiratory - Adult  Goal: Achieves optimal ventilation and oxygenation  Outcome: Progressing     Problem: Cardiovascular - Adult  Goal: Maintains optimal cardiac output and hemodynamic stability  Outcome: Progressing  Goal: Absence of cardiac dysrhythmias or at baseline  Outcome: Progressing     Problem: Skin/Tissue Integrity - Adult  Goal: Skin integrity remains intact  Description: 1.  Monitor for areas of redness and/or skin breakdown2.  Assess vascular access sites hourly3.  Every 4-6 hours minimum:  Change oxygen saturation probe site4.  Every 4-6 hours:  If on nasal continuous positive airway pressure, respiratory therapy assess nares and determine need for appliance change or resting period  Outcome: Progressing  Goal: Incisions, wounds, or drain sites healing without S/S of infection  Outcome:  Spiritual Care, Psychosocial Clinical Specialist consults as needed  Outcome: Progressing     Problem: Decision Making  Goal: Pt/Family able to effectively weigh alternatives and participate in decision making related to treatment and care  Description: INTERVENTIONS:1. Determine when there are differences between patient's view, family's view, and healthcare provider's view of condition2. Facilitate patient and family articulation of goals for care3. Help patient and family identify pros/cons of alternative solutions4. Provide information as requested by patient/family5. Respect patient/family right to receive or not to receive information6. Serve as a liaison between patient and family and health care team7. Initiate Consults from Ethics, Palliative Care or initiate Family Care Conference as is appropriate  Outcome: Progressing

## 2025-06-05 NOTE — CARE COORDINATION
Received a call from Keiko at [facility]. They are unable to accept pt.  They take [facility] PPO polices , but they can not take  pts [facility] HMO policy as it will not cover.  Will follow up with spouse.  Electronically signed by Evie Carroll RN on 6/6/2025 at 9:57 AM        Received a call from Susan at [facility], they are not in network with pts insurance and he did not have out of netowrk benefits. [facility] will not be able to offer.  Gave pts spouse a SNF choice list to review.  She requests a referral be sent to [facility].  Will send referral through [facility]  Electronically signed by Evie Carroll RN on 6/5/2025 at 2:34 PM

## 2025-06-05 NOTE — PROGRESS NOTES
Cardiology Progress Note Vladimir Cohen MD      Patient:  Cholo Bhakta  [id number]    Patient Active Problem List    Diagnosis Date Noted    Acute on chronic systolic CHF (congestive heart failure) ([facility]) 04/02/2025     Priority: Low    Palliative care patient 03/07/2025     Priority: Low    Acute on chronic congestive heart failure, unspecified heart failure type ([facility]) 03/06/2025     Priority: Low    Gout 03/06/2025     Priority: Low    Memory loss 03/06/2025     Priority: Low    GERD (gastroesophageal reflux disease) 03/06/2025     Priority: Low    BPH (benign prostatic hyperplasia) 03/06/2025     Priority: Low    Neuropathy 03/06/2025     Priority: Low    Diabetes mellitus ([facility]) 02/22/2025     Priority: Low    CHF, left ventricular ([facility]) 02/21/2025     Priority: Low    Acute on chronic systolic heart failure ([facility]) 02/21/2025     Priority: Low    Volume overload 02/21/2025     Priority: Low    CKD (chronic kidney disease) stage 4, GFR 15-29 ml/min ([facility]) 02/21/2025     Priority: Low    Cardiorenal syndrome 02/21/2025     Priority: Low    Chronic obstructive pulmonary disease ([facility]) 12/03/2024     Priority: Low    CAD (coronary artery disease) 01/20/2023     Priority: Low    Diabetic ulcer of left foot associated with type 2 diabetes mellitus ([facility]) 08/31/2020     Priority: Low    Essential hypertension 05/03/2017     Priority: Low    Severe malnutrition 06/03/2025    Fall 06/03/2025    SANGEETA (acute kidney injury) 06/02/2025       Admit Date:  6/2/2025    Admission Problem List: Present on Admission:   SANGEETA (acute kidney injury)   CKD (chronic kidney disease) stage 4, GFR 15-29 ml/min ([facility])   Chronic obstructive pulmonary disease (HCC)   Essential hypertension   Diabetes mellitus (HCC)   CAD (coronary artery disease)   Diabetic ulcer of left foot associated with type 2 diabetes mellitus ([facility])   Memory loss   Severe malnutrition   Fall      Cardiac Specific Data:  Specialty Problems          Cardiology Problems    Essential

## 2025-06-05 NOTE — PROGRESS NOTES
Nephrology ([facility]) Progress Note    Patient:  Cholo Bhakta  YOB: 1955  Date of Service: 6/5/2025  MRN: [id number]   Acct: [id number]   Primary Care Physician: Nirmal Nuno MD  Advance Directive: Full Code  Admit Date: 6/2/2025       Hospital Day: 3  Referring Provider: Adrián Benz MD    Patient independently seen and examined, Chart, Consults, Notes, Operative notes, Labs, Cardiology, and Radiology studies reviewed as available.    Chief complaint: Abnormal labs.    Subjective:  Cholo Bhakta is a 69 y.o. male for whom we were consulted for evaluation and treatment of chronic kidney disease/progressed to end-stage renal disease.  Patient has multiple comorbid condition including morbid abdominal obesity, coronary artery disease, CABG, peripheral vascular disease, CVA, congestive heart failure, diabetic foot ulcer and hypertension.  He follows up in the office.  He has progressive decline in renal function however has refused dialysis treatment previously.  Presented to the emergency room complaining of fall, profound fatigue, lack of energy, sleepiness with poor appetite.  His wife tells me that he remains in the bed most of the time.  Routine lab data indicated serum creatinine now 5.1 mg with blood urea nitrogen more than 100 mg.  He now agreed to proceed with dialysis.  Patient also had pending arteriogram/runoff for the treatment of peripheral vascular disease affecting his bilateral lower extremities.  On June 4, he underwent permacatheter placement without any complications.    This morning he was fully alert and awake, denies any shortness of breath.  He is receiving hemodialysis treatment    Currently seen on hemodialysis  Hemodialysis access: Permacath  Hemodialysis: 3 hours  Ultrafiltration: 2000 cc  2K bath  Blood flow rate is 300 cc/min.    Allergies:  Cefepime, Bactrim [sulfamethoxazole-trimethoprim], and Metronidazole    Medicines:  Current  HISTORY:  Status post fall  TECHNIQUE:  CT of the cervical spine with multiplanar reformations.  FINDINGS:  Reformatted images demonstrate normal alignment with preservation of vertebral body height. Prior fusion C5 - C7.  No fracture seen on the axial or reformatted images. No acute surrounding soft tissue abnormalitites. Lung apices are clear.      No acute findings in the cervical spine.  All CT scans are performed using dose optimization techniques as appropriate to the performed exam and include at least one of the following: Automated exposure control, adjustment of the mA and/or kV according to size, and the use of iterative reconstruction technique.  ______________________________________ Electronically signed by: MICHAEL SMITH M.D. Date:     06/02/2025 Time:    05:19        Assessment   1.  End-stage renal disease/new onset of dialysis.  2.  Uremic manifestation.  3.  Type 2 diabetes with renal failure.  4.  History of peripheral vascular disease.  5.  Status post recent fall.  6.  Anemia of chronic kidney disease.  7.  Secondary hyperparathyroidism.  8.  Diabetic foot ulcer.  9.  Recurrent hyponatremia related to CHF and CKD      Plan:  1.  Tolerating hemodialysis well.  Ultrafiltration up to 2000 cc.,  Repeat hemodialysis treatment tomorrow.  2.  Lower extremity arteriogram her postpartum until his stability.  3.  Outpatient dialysis disposition needed.  4.  Plan was discussed with the patient.    Shailesh Freed MD  06/05/25  11:01 AM

## 2025-06-06 ENCOUNTER — APPOINTMENT (OUTPATIENT)
Dept: CT IMAGING | Age: 70
End: 2025-06-06
Payer: MEDICARE

## 2025-06-06 LAB
ALBUMIN SERPL-MCNC: 3.4 G/DL (ref 3.5–5.2)
ALP SERPL-CCNC: 148 U/L (ref 40–129)
ALT SERPL-CCNC: 7 U/L (ref 10–50)
ANION GAP SERPL CALCULATED.3IONS-SCNC: 19 MMOL/L (ref 8–16)
AST SERPL-CCNC: 33 U/L (ref 10–50)
BASOPHILS # BLD: 0.1 K/UL (ref 0–0.2)
BASOPHILS NFR BLD: 0.9 % (ref 0–1)
BILIRUB SERPL-MCNC: 0.6 MG/DL (ref 0.2–1.2)
BUN SERPL-MCNC: 73 MG/DL (ref 8–23)
CALCIUM SERPL-MCNC: 9.2 MG/DL (ref 8.8–10.2)
CHLORIDE SERPL-SCNC: 94 MMOL/L (ref 98–107)
CO2 SERPL-SCNC: 24 MMOL/L (ref 22–29)
CREAT SERPL-MCNC: 4.3 MG/DL (ref 0.7–1.2)
EOSINOPHIL # BLD: 0.1 K/UL (ref 0–0.6)
EOSINOPHIL NFR BLD: 2.1 % (ref 0–5)
ERYTHROCYTE [DISTWIDTH] IN BLOOD BY AUTOMATED COUNT: 14.3 % (ref 11.5–14.5)
GLUCOSE BLD-MCNC: 137 MG/DL (ref 70–99)
GLUCOSE BLD-MCNC: 142 MG/DL (ref 70–99)
GLUCOSE BLD-MCNC: 167 MG/DL (ref 70–99)
GLUCOSE BLD-MCNC: 169 MG/DL (ref 70–99)
GLUCOSE SERPL-MCNC: 172 MG/DL (ref 70–99)
HCT VFR BLD AUTO: 37.1 % (ref 42–52)
HGB BLD-MCNC: 12 G/DL (ref 14–18)
IMM GRANULOCYTES # BLD: 0 K/UL
LYMPHOCYTES # BLD: 1.2 K/UL (ref 1.1–4.5)
LYMPHOCYTES NFR BLD: 18.1 % (ref 20–40)
MCH RBC QN AUTO: 28.9 PG (ref 27–31)
MCHC RBC AUTO-ENTMCNC: 32.3 G/DL (ref 33–37)
MCV RBC AUTO: 89.4 FL (ref 80–94)
MONOCYTES # BLD: 1.2 K/UL (ref 0–0.9)
MONOCYTES NFR BLD: 17.8 % (ref 0–10)
NEUTROPHILS # BLD: 4 K/UL (ref 1.5–7.5)
NEUTS SEG NFR BLD: 60.7 % (ref 50–65)
PERFORMED ON: ABNORMAL
PLATELET # BLD AUTO: 166 K/UL (ref 130–400)
PMV BLD AUTO: 11.8 FL (ref 9.4–12.4)
POTASSIUM SERPL-SCNC: 3.8 MMOL/L (ref 3.5–5)
POTASSIUM SERPL-SCNC: 3.8 MMOL/L (ref 3.5–5.1)
PROT SERPL-MCNC: 7.2 G/DL (ref 6.4–8.3)
RBC # BLD AUTO: 4.15 M/UL (ref 4.7–6.1)
REASON FOR REJECTION: NORMAL
REJECTED TEST: NORMAL
SODIUM SERPL-SCNC: 137 MMOL/L (ref 136–145)
WBC # BLD AUTO: 6.7 K/UL (ref 4.8–10.8)

## 2025-06-06 PROCEDURE — 82962 GLUCOSE BLOOD TEST: CPT

## 2025-06-06 PROCEDURE — 8010000000 HC HEMODIALYSIS ACUTE INPT

## 2025-06-06 PROCEDURE — 2500000003 HC RX 250 WO HCPCS: Performed by: ANESTHESIOLOGY

## 2025-06-06 PROCEDURE — 1200000000 HC SEMI PRIVATE

## 2025-06-06 PROCEDURE — 99232 SBSQ HOSP IP/OBS MODERATE 35: CPT | Performed by: INTERNAL MEDICINE

## 2025-06-06 PROCEDURE — 6370000000 HC RX 637 (ALT 250 FOR IP): Performed by: SURGERY

## 2025-06-06 PROCEDURE — 80053 COMPREHEN METABOLIC PANEL: CPT

## 2025-06-06 PROCEDURE — 6360000002 HC RX W HCPCS: Performed by: SURGERY

## 2025-06-06 PROCEDURE — 6370000000 HC RX 637 (ALT 250 FOR IP): Performed by: INTERNAL MEDICINE

## 2025-06-06 PROCEDURE — 85025 COMPLETE CBC W/AUTO DIFF WBC: CPT

## 2025-06-06 PROCEDURE — 6360000002 HC RX W HCPCS: Performed by: INTERNAL MEDICINE

## 2025-06-06 PROCEDURE — 6360000002 HC RX W HCPCS

## 2025-06-06 PROCEDURE — 36415 COLL VENOUS BLD VENIPUNCTURE: CPT

## 2025-06-06 PROCEDURE — 51798 US URINE CAPACITY MEASURE: CPT

## 2025-06-06 PROCEDURE — 97530 THERAPEUTIC ACTIVITIES: CPT

## 2025-06-06 PROCEDURE — 73700 CT LOWER EXTREMITY W/O DYE: CPT

## 2025-06-06 RX ORDER — CLINDAMYCIN PHOSPHATE 900 MG/50ML
900 INJECTION, SOLUTION INTRAVENOUS EVERY 8 HOURS
Status: COMPLETED | OUTPATIENT
Start: 2025-06-06 | End: 2025-06-13

## 2025-06-06 RX ORDER — LEVOFLOXACIN 5 MG/ML
500 INJECTION, SOLUTION INTRAVENOUS
Status: COMPLETED | OUTPATIENT
Start: 2025-06-08 | End: 2025-06-10

## 2025-06-06 RX ORDER — METOPROLOL SUCCINATE 25 MG/1
25 TABLET, EXTENDED RELEASE ORAL DAILY
Status: DISCONTINUED | OUTPATIENT
Start: 2025-06-07 | End: 2025-06-14 | Stop reason: HOSPADM

## 2025-06-06 RX ORDER — SODIUM HYPOCHLORITE 1.25 MG/ML
SOLUTION TOPICAL 2 TIMES DAILY
Status: DISCONTINUED | OUTPATIENT
Start: 2025-06-06 | End: 2025-06-14 | Stop reason: HOSPADM

## 2025-06-06 RX ORDER — LEVOFLOXACIN 5 MG/ML
750 INJECTION, SOLUTION INTRAVENOUS ONCE
Status: COMPLETED | OUTPATIENT
Start: 2025-06-06 | End: 2025-06-06

## 2025-06-06 RX ORDER — LEVOFLOXACIN 5 MG/ML
250 INJECTION, SOLUTION INTRAVENOUS EVERY 24 HOURS
Status: DISCONTINUED | OUTPATIENT
Start: 2025-06-06 | End: 2025-06-06

## 2025-06-06 RX ADMIN — PREGABALIN 50 MG: 50 CAPSULE ORAL at 20:41

## 2025-06-06 RX ADMIN — ONDANSETRON 4 MG: 2 INJECTION INTRAMUSCULAR; INTRAVENOUS at 13:47

## 2025-06-06 RX ADMIN — ISOSORBIDE DINITRATE 20 MG: 10 TABLET ORAL at 11:41

## 2025-06-06 RX ADMIN — CALCITRIOL CAPSULES 0.25 MCG 0.25 MCG: 0.25 CAPSULE ORAL at 11:41

## 2025-06-06 RX ADMIN — OXYCODONE HYDROCHLORIDE 10 MG: 10 TABLET ORAL at 00:04

## 2025-06-06 RX ADMIN — PREGABALIN 50 MG: 50 CAPSULE ORAL at 11:41

## 2025-06-06 RX ADMIN — FERROUS SULFATE TAB 325 MG (65 MG ELEMENTAL FE) 325 MG: 325 (65 FE) TAB at 11:41

## 2025-06-06 RX ADMIN — LEVOFLOXACIN 750 MG: 5 INJECTION, SOLUTION INTRAVENOUS at 14:23

## 2025-06-06 RX ADMIN — ESCITALOPRAM OXALATE 10 MG: 10 TABLET ORAL at 11:41

## 2025-06-06 RX ADMIN — HEPARIN SODIUM 3600 UNITS: 1000 INJECTION INTRAVENOUS; SUBCUTANEOUS at 11:16

## 2025-06-06 RX ADMIN — FAMOTIDINE 20 MG: 20 TABLET, FILM COATED ORAL at 11:41

## 2025-06-06 RX ADMIN — DONEPEZIL HYDROCHLORIDE 10 MG: 10 TABLET, FILM COATED ORAL at 11:43

## 2025-06-06 RX ADMIN — INSULIN GLARGINE 10 UNITS: 100 INJECTION, SOLUTION SUBCUTANEOUS at 11:42

## 2025-06-06 RX ADMIN — SODIUM CHLORIDE, PRESERVATIVE FREE 10 ML: 5 INJECTION INTRAVENOUS at 11:41

## 2025-06-06 RX ADMIN — HEPARIN SODIUM 5000 UNITS: 5000 INJECTION INTRAVENOUS; SUBCUTANEOUS at 20:45

## 2025-06-06 RX ADMIN — INSULIN LISPRO 7 UNITS: 100 INJECTION, SOLUTION INTRAVENOUS; SUBCUTANEOUS at 11:42

## 2025-06-06 RX ADMIN — CLINDAMYCIN PHOSPHATE 900 MG: 900 INJECTION, SOLUTION INTRAVENOUS at 14:23

## 2025-06-06 RX ADMIN — PANTOPRAZOLE SODIUM 40 MG: 40 TABLET, DELAYED RELEASE ORAL at 11:41

## 2025-06-06 RX ADMIN — Medication 10 MG: at 20:41

## 2025-06-06 RX ADMIN — DOCUSATE SODIUM 100 MG: 100 CAPSULE, LIQUID FILLED ORAL at 11:41

## 2025-06-06 RX ADMIN — INSULIN GLARGINE 10 UNITS: 100 INJECTION, SOLUTION SUBCUTANEOUS at 20:41

## 2025-06-06 RX ADMIN — FERROUS SULFATE TAB 325 MG (65 MG ELEMENTAL FE) 325 MG: 325 (65 FE) TAB at 20:41

## 2025-06-06 RX ADMIN — DOCUSATE SODIUM 100 MG: 100 CAPSULE, LIQUID FILLED ORAL at 20:41

## 2025-06-06 RX ADMIN — HEPARIN SODIUM 5000 UNITS: 5000 INJECTION INTRAVENOUS; SUBCUTANEOUS at 13:15

## 2025-06-06 RX ADMIN — TAMSULOSIN HYDROCHLORIDE 0.4 MG: 0.4 CAPSULE ORAL at 11:41

## 2025-06-06 RX ADMIN — ROSUVASTATIN 10 MG: 10 TABLET, FILM COATED ORAL at 20:45

## 2025-06-06 RX ADMIN — CLINDAMYCIN PHOSPHATE 900 MG: 900 INJECTION, SOLUTION INTRAVENOUS at 23:15

## 2025-06-06 RX ADMIN — SODIUM CHLORIDE, PRESERVATIVE FREE 10 ML: 5 INJECTION INTRAVENOUS at 20:42

## 2025-06-06 RX ADMIN — DAKIN'S SOLUTION 0.125% (QUARTER STRENGTH): 0.12 SOLUTION at 13:15

## 2025-06-06 ASSESSMENT — PAIN SCALES - GENERAL
PAINLEVEL_OUTOF10: 10
PAINLEVEL_OUTOF10: 0

## 2025-06-06 NOTE — PROGRESS NOTES
Physical Therapy  Name: Cholo Bhakta  MRN:  [id number]  Date of service:  6/6/2025 06/06/25 1519   Restrictions/Precautions   Restrictions/Precautions Fall Risk;Weight Bearing   Lower Extremity Weight Bearing Restrictions   Right Lower Extremity Weight Bearing Non Weight Bearing  (wound vac)   General   Family/Caregiver Present Yes   Referring Practitioner Luiz Muñoz MD   Subjective   Subjective Move thoses pillows.  i gotta get up   Oxygen Therapy   O2 Device None (Room air)   Exercises   Straight Leg Raise 5   Heelslides 5   Hip Flexion 5   Short Term Goals   Time Frame for Short Term Goals 2 wks   Short Term Goal 1 supine to sit indep   Short Term Goal 2 sit to stand indep   Short Term Goal 3 bed to chair SBA RW   Conditions Requiring Skilled Therapeutic Intervention   Body Structures, Functions, Activity Limitations Requiring Skilled Therapeutic Intervention Decreased functional mobility ;Decreased ADL status;Decreased ROM;Decreased body mechanics;Decreased sensation;Decreased endurance;Decreased safe awareness;Decreased cognition;Decreased balance;Increased pain;Decreased posture   Assessment Patient confused and not following directions consistantly.  He was able to scoot himself up slightly in bed.  He was trying to sit on the edge of the bed upon my arrival and wife was telling him no.  He was becoming agitated.  Attempted to redirect with bed exercises however he only did a few and then stopped particapating and stopped answering questions.   Treatment Diagnosis impaired gait and mobility   Discharge Recommendations Continue to assess pending progress;24 hour supervision or assist;Patient would benefit from continued therapy after discharge   Activity Tolerance   Activity Tolerance Treatment limited secondary to decreased cognition   Physical Therapy Plan   General Plan 5-7 times per week   Therapy Duration 2 Weeks   Current Treatment Recommendations Strengthening;ROM;Balance training;Functional  mobility training;Transfer training;Endurance training;Patient/Caregiver education & training;Safety education & training;Positioning;Equipment evaluation, education, & procurement;Therapeutic activities   PT Plan of Care   Friday X   Safety Devices   Type of Devices Bed alarm in place;Chair alarm in place;Call light within reach;Left in bed;Nurse notified         Electronically signed by Liane Roca PTA on 6/6/2025 at 3:30 PM

## 2025-06-06 NOTE — PLAN OF CARE
Problem: Nutrition Deficit:  Goal: Optimize nutritional status  Recent Flowsheet Documentation  Taken 6/6/2025 1319 by Deidre Azevedo, MS, RD, LD  Nutrient intake appropriate for improving, restoring, or maintaining nutritional needs:   Assess nutritional status and recommend course of action   Monitor oral intake, labs, and treatment plans   Recommend appropriate diets, oral nutritional supplements, and vitamin/mineral supplements  6/6/2025 0007 by Azar Smith LPN  Outcome: Progressing

## 2025-06-06 NOTE — PROGRESS NOTES
Nephrology ([facility]) Progress Note    Patient:  Cholo Bhakta  YOB: 1955  Date of Service: 6/6/2025  MRN: [id number]   Acct: [id number]   Primary Care Physician: Nirmal Nuno MD  Advance Directive: Full Code  Admit Date: 6/2/2025       Hospital Day: 4  Referring Provider: Adrián Benz MD    Patient independently seen and examined, Chart, Consults, Notes, Operative notes, Labs, Cardiology, and Radiology studies reviewed as available.    Chief complaint: Abnormal labs.    Subjective:  Cholo Bhakta is a 69 y.o. male for whom we were consulted for evaluation and treatment of chronic kidney disease/progressed to end-stage renal disease.  Patient has multiple comorbid condition including morbid abdominal obesity, coronary artery disease, CABG, peripheral vascular disease, CVA, congestive heart failure, diabetic foot ulcer and hypertension.  He follows up in the office.  He has progressive decline in renal function however has refused dialysis treatment previously.  Presented to the emergency room complaining of fall, profound fatigue, lack of energy, sleepiness with poor appetite.  His wife tells me that he remains in the bed most of the time.  Routine lab data indicated serum creatinine now 5.1 mg with blood urea nitrogen more than 100 mg.  He now agreed to proceed with dialysis.  Patient also had pending arteriogram/runoff for the treatment of peripheral vascular disease affecting his bilateral lower extremities.  On June 4, he underwent permacatheter placement without any complications.    This morning patient is alert and awake, denies any shortness of breath.  Dialysis nurse noticed low blood pressure.  He is getting second hemodialysis treatment today with    Currently seen on hemodialysis  Hemodialysis access: Permacath  Hemodialysis: 3 hours  Ultrafiltration: 2000 cc  2K bath  Blood flow rate is 400 cc/min    Allergies:  Cefepime, Bactrim  Prior fusion C5 - C7.  No fracture seen on the axial or reformatted images. No acute surrounding soft tissue abnormalitites. Lung apices are clear.      No acute findings in the cervical spine.  All CT scans are performed using dose optimization techniques as appropriate to the performed exam and include at least one of the following: Automated exposure control, adjustment of the mA and/or kV according to size, and the use of iterative reconstruction technique.  ______________________________________ Electronically signed by: MICHAEL SMITH M.D. Date:     06/02/2025 Time:    05:19        Assessment   1.  End-stage renal disease/new onset of dialysis.  2.  Uremic manifestation.  3.  Type 2 diabetes with renal failure.  4.  History of peripheral vascular disease.  5.  Status post recent fall.  6.  Anemia of chronic kidney disease.  7.  Secondary hyperparathyroidism.  8.  Diabetic foot ulcer.  9.  Recurrent hyponatremia related to CHF and CKD      Plan:  1.  Tolerating dialysis well.,  Ultrafiltration up to 2000 cc.  2.  Resume hemodialysis tomorrow again.  3.  Initiate midodrine 2.5 mg p.o. 3 times daily for blood pressure support.  4.  Plan was discussed with the patient and dialysis staff    Shailesh Freed MD  06/06/25  11:14 AM

## 2025-06-06 NOTE — CARE COORDINATION
Outpt HD set up started through the [facility] portal.  All clincial faxed.  Unsure as to what clinic to set pt up at as wrtiter still pending poss nhp acceptance.  Currently pending referral with [facility].  Electronically signed by Evie Carroll RN on 6/6/2025 at 2:29 PM

## 2025-06-06 NOTE — DIALYSIS
[facility] INPATIENT SERVICES  DIALYSIS TREATMENT SUMMARY      Note: Consult with the attending physician for patient treatment orders, this document is not a physician order.      Patient Information   Patient Cholo Bhakta   Date of Birth June 18, 1955   Chart Number [id number]   Location [facility] MRN [id number]   Gender Male   SSN (last 4) [id number]     Treatment Information   Treatment Type Hemodialysis   Treatment Id [id number]   Start Time June 06, 2025 08:05   End Time June 06, 2025 11:07   Acutal Duration 03:02     Treatment Balances   Total Saline Administered 500   Other 100   Net Fluid Balance -1400   Reason Goal Not Met Hypotension - MD Aware    Hemodialysis Orders   Therapy Standard   Orders Verified Time 06/06/2025 08:00    Date Verified 06/06/2025   Duration 3:00   Isolated UF/Bypass No   BFR (mL) 400   DFR (mL) 600   Dialyzer Type OPTIFLUX 160NR   UF Order UF Goal Ordered   UF Goal Ordered (mL) 2000   Crit-Line used No   Heparin Initial Units Bolus No   Heparin IV Maintenance Bolus No   Heparin IV Infusion No   Potassium (mEq/L) 3   Calcium (mEq/L) 2.5   Bicarb (mg/dL) 35   Sodium (mEq/L) 140   Clinician Nelson Haque RN    Dialysis Access   Access Type Central Venous Catheter   Central Venous Catheter   Access Type Catheter - Tunneled   Date Central Venous Catheter Placed 06/04/2025   Access Location Chest Wall - R   Current/New [facility] Patient Yes   Surgeon Dr. Lyons   1st Use Catheter Verified by Previous Use   Catheter Care Completed per Policy Yes   Dressing Dry and Intact on Arrival Yes   Dressing Changed Yes   Type of Dressing Film Biopatch/CHG   Dressing Changed By [facility] Staff   Type of HD Caps Curos   HD Caps Changed Yes   CVC Line Education Provided Yes - Infection Prevention      Vitals   Pre-Treatment Vitals   Time Is BP being recorded? Pre BP Map BP Method Pulse RR Temp How was Weight Obtained? Pre Weight Previous Dry Weight Previous Post Weight Metric Target  Assessment Completed by Nelson Haque RN   Dialyzer Clearance Streaked Skin Date 06/06/2025   Pain Screening Skin Warm Time 11:23   Does the patient have pain? No  Dry Signing   Respiratory LOC Signed By Nelson Haque RN   Lung Sounds Diminished LOC Oriented to Person

## 2025-06-06 NOTE — CARE COORDINATION
Called pts insurance and asked which snf's were in network. Per representative, the only facility showing up in a 30 mile radius of pts address is [facility].   Called and spoke with pts spouse Dee,  informed her of the above information and the denial by [place].  Spouse agreed to the referral to [facility].  Will send referral through [facility].  Electronically signed by Evie Carroll RN on 6/6/2025 at 11:09 AM

## 2025-06-06 NOTE — PROGRESS NOTES
[facility]      Patient:  Cholo Bhakta  YOB: 1955  Date of Service: 6/6/2025  MRN: [id number]   Acct: [id number]   Primary Care Physician: Nirmal Nuno MD  Advance Directive: Full Code  Admit Date: 6/2/2025       Hospital Day: 4  Portions of this note have been copied forward, however, changed to reflect the most current clinical status of this patient    CHIEF COMPLAINT fall    SUBJECTIVE: intermittent confusion after dialysis yesterday, able to answer questions and follow commands seen in dialysis     Cumulative hospital course   69-year-old male with HTN, hyperlipidemia, type II DM, CKD 4, chronic AF on anticoagulation, chronic systolic heart failure, CAD s/p CABG 2015, with complaints of fall.  Patient unable to provide HPI obtained earlier from spouse at bedside.  Spouse reported that patient not been feeling well ongoing for the past week.  Does have chronic wounds to sacral area and left that he follows with outpatient wound care in [place].  Has been following closely with cardiology recently seen on 5/20 and recently increased Bumex to 3 mg twice daily along with Zaroxolyn after complaints of increased weight gain and dyspnea.  Have discussed with nephrology past regarding dialysis and at that time he had declined.  Workup in ER CT head no acute intracranial abnormality, CT cervical/lumbar spine no acute osseous abnormality, CT pelvis no acute findings, CXR no acute cardiopulmonary process, creatinine 5.3 .  Patient admitted to hospitalist service with consultation to nephrology and podiatry.  Chronic appearing left foot wound along with coccyx wound we will consult wound care and monitor off antibiotics. Podiatry evaluated no acute infection, will remain on wound vac and follow up with DR Heaton left chronic foot ulcer. After further discussion with spouse and patient agreeable to dialysis. Vascular consulted and permacath placement 6/4. Has had intermittent chest pain at    Wound care following  Worsening odor, maceration to jeevan-wound wet to dry dressing now in place   Podiatry called and will see today   CT foot r/o abscess   IV antibiotics   Chronic systolic heart failure   No signs of overload    Toprol XL   Hold Bumex, Zaroxolyn    CAD (coronary artery disease)  - Cardiology following, plan for outpatient ischemic workup  -  Lipitor  -increased Isordil, no recurrent issues of chest pain  - ECHO from March reviewed   - Trend troponin 144 with repeat 137  - Serial and PRN EKGs  - Monitor on telemetry   Chronic obstructive pulmonary disease    No signs of exacerbation   Atrial fibrillation    Currently rate controlled    Toprol XL    Monitor on telemetry   Essential hypertension   Monitor vital signs    Memory loss   Improving   Aricept     DVT Prophylaxis: Heparin subcutaneously       Ramu Esposito, APRN - CNP, 6/6/2025 12:49 PM

## 2025-06-06 NOTE — PROGRESS NOTES
Physical Therapy  Name: Cholo Bhakta  MRN:  [id number]  Date of service:  6/6/2025    Patient in dialysis this am.  Physical Therapy will continue to follow and progress as able.    Electronically signed by Liane Roca PTA on 6/6/2025 at 10:25 AM

## 2025-06-06 NOTE — PROGRESS NOTES
Pharmacy Renal Adjustment    Cholo Bhakta is a 69 y.o. male. Pharmacy has renally adjusted medications per protocol.    Recent Labs     06/05/25  0422 06/06/25  0535   * 73*       Recent Labs     06/05/25  0422 06/06/25  0535   CREATININE 4.1* 4.3*       Estimated Creatinine Clearance: 22 mL/min (A) (based on SCr of 4.3 mg/dL (H)).    Height:   Ht Readings from Last 1 Encounters:   06/03/25 1.829 m (6' 0.01\")     Weight:  Wt Readings from Last 1 Encounters:   06/03/25 125.3 kg (276 lb 4.8 oz)       CKD stage: ESRD - HD             Plan: Adjust the following medications based on renal function:           Changing Levaquin to 750 mg IV x 1 dose, then 500 mg IV q48h    Electronically signed by Stephanie Stewart RPh, BCPS, 6/6/2025,1:21 PM

## 2025-06-06 NOTE — PROGRESS NOTES
Nutrition Assessment     Type and Reason for Visit: Reassess    Nutrition Recommendations/Plan:   Continue current POC     Malnutrition Assessment:  Malnutrition Status: Severe malnutrition    Nutrition Assessment:  Patint in dialysis at time of visit.  Breakfast was held.  PO intake still variable.  New weight not available.   Glucose 116-172.,  Accuchek's 118-232  Continues to have wound vac    Estimated Daily Nutrient Needs:  Energy (kcal):  9284-4863 kcals (8-15 kcals/kg) Weight Used for Energy Requirements: Current     Protein (g):  149g Weight Used for Protein Requirements: Ideal        Fluid (ml/day):  4459-9626 ml Method Used for Fluid Requirements: Standard renal    Nutrition Related Findings:   +2 pitting BLE edema., +1 BUE edema Wound Type: Multiple, Diabetic Ulcer, Moisture Associate Skin Damage    Current Nutrition Therapies:    ADULT DIET; Regular; Low Sodium (2 gm); Low Potassium (Less than 3000 mg/day); Low Phosphorus (Less than 1000 mg)    Anthropometric Measures:  Height: 182.9 cm (6' 0.01\")  Current Body Wt: 125.3 kg (276 lb 3.8 oz)   BMI: 37.5        Nutrition Diagnosis:   Inadequate protein-energy intake related to acute injury/trauma, increase demand for energy/nutrients as evidenced by intake 0-25%, intake 26-50%, poor intake prior to admission, wounds, dialysis    Nutrition Interventions:   Food and/or Nutrient Delivery: Continue Current Diet  Nutrition Education/Counseling: No recommendation at this time  Coordination of Nutrition Care: Continue to monitor while inpatient       Goals:  Goals: Meet at least 75% of estimated needs, PO intake 50% or greater, Maintain adequate nutrition status  Type of Goal: Continue current goal  Previous Goal Met: Progressing toward Goal(s)    Nutrition Monitoring and Evaluation:   Behavioral-Environmental Outcomes: None Identified  Food/Nutrient Intake Outcomes: Food and Nutrient Intake  Physical Signs/Symptoms Outcomes: Biochemical Data, Chewing or

## 2025-06-06 NOTE — PROGRESS NOTES
Cardiology Progress Note Vladimir Cohen MD      Patient:  Cholo Bhakta  [id number]    Patient Active Problem List    Diagnosis Date Noted    Acute on chronic systolic CHF (congestive heart failure) ([facility]) 04/02/2025     Priority: Low    Palliative care patient 03/07/2025     Priority: Low    Acute on chronic congestive heart failure, unspecified heart failure type ([facility]) 03/06/2025     Priority: Low    Gout 03/06/2025     Priority: Low    Memory loss 03/06/2025     Priority: Low    GERD (gastroesophageal reflux disease) 03/06/2025     Priority: Low    BPH (benign prostatic hyperplasia) 03/06/2025     Priority: Low    Neuropathy 03/06/2025     Priority: Low    Diabetes mellitus ([facility]) 02/22/2025     Priority: Low    CHF, left ventricular ([facility]) 02/21/2025     Priority: Low    Acute on chronic systolic heart failure ([facility]) 02/21/2025     Priority: Low    Volume overload 02/21/2025     Priority: Low    CKD (chronic kidney disease) stage 4, GFR 15-29 ml/min ([facility]) 02/21/2025     Priority: Low    Cardiorenal syndrome 02/21/2025     Priority: Low    Chronic obstructive pulmonary disease ([facility]) 12/03/2024     Priority: Low    CAD (coronary artery disease) 01/20/2023     Priority: Low    Diabetic ulcer of left foot associated with type 2 diabetes mellitus ([facility]) 08/31/2020     Priority: Low    Essential hypertension 05/03/2017     Priority: Low    Severe malnutrition 06/03/2025    Fall 06/03/2025    SANGEETA (acute kidney injury) 06/02/2025       Admit Date:  6/2/2025    Admission Problem List: Present on Admission:   SANGEETA (acute kidney injury)   CKD (chronic kidney disease) stage 4, GFR 15-29 ml/min ([facility])   Chronic obstructive pulmonary disease (HCC)   Essential hypertension   Diabetes mellitus (HCC)   CAD (coronary artery disease)   Diabetic ulcer of left foot associated with type 2 diabetes mellitus ([facility])   Memory loss   Severe malnutrition   Fall      Cardiac Specific Data:  Specialty Problems          Cardiology Problems    Essential

## 2025-06-06 NOTE — CONSULTS
Orthopaedic Inpatient Consultation    NAME:  Cholo Bhakta   :    1955  MRN:    [id number]      CHIEF COMPLAINT: chronic diabetic ulcer of the left foot      HISTORY OF PRESENT ILLNESS:   The patient is a 69 y.o. male who presents with the above complaint. Patient was admitted for a fall and SANGEETA, all ED scans were negative for acute fractures or dislocations. Patient and family report that this foot wound started about a year ago with a diabetic foot ulcer that required surgical intervention from Dr. Heaton, podiatrist in [place]. He reports he recently finished a course of PO antibiotics prescribed by Dr. Heaton. The patient had an appointment with Dr. Heaton this week and missed it due to hospital admission. Dr. Glover, podiatry, consulted for further management on 6/3/2025. At that time, there were no concerns for new infection. Since then, WBC has remained normal and patient has been afebrile. Podiatry was re-consulted for concern of developing abscess due to new odor coming from wound and color change to edges of chronic wounds.     Past Medical History:        Diagnosis Date    CHF (congestive heart failure) (HCC)     Chronic kidney disease     Diabetes mellitus (HCC)     Hyperlipidemia     Hypertension     Palliative care patient 2025       Past Surgical History:        Procedure Laterality Date    CORONARY ARTERY BYPASS GRAFT      VASCULAR SURGERY N/A 2025    PERMCATH INSERTION performed by Aj Lyons MD at [facility] OR       Current Medications:   Prior to Admission medications    Medication Sig Start Date End Date Taking? Authorizing Provider   metoprolol succinate (TOPROL XL) 50 MG extended release tablet Take 1 tablet by mouth daily 25  Yes Scott Wilburn APRN   bumetanide (BUMEX) 2 MG tablet Take 1.5 tablets by mouth 2 times daily   Yes Provider, MD Padmini   metOLazone (ZAROXOLYN) 5 MG tablet Take 1 tablet by mouth Every Monday, Wednesday, and Friday  Patient taking  differently: Take 1 tablet by mouth daily 4/7/25  Yes Brandee Forrest APRN - CNP   Ergocalciferol (VITAMIN D) 96984 units CAPS Take 50,000 Units by mouth once a week 3/15/25  Yes Ramu Esposito APRN - CNP   insulin glargine (LANTUS SOLOSTAR) 100 UNIT/ML injection pen Inject 20 Units into the skin nightly Every evening for 90 days starting 3/5/25 3/14/25  Yes Ramu Esposito APRN - CNP   docusate sodium (COLACE) 100 MG capsule Take 1 capsule by mouth 2 times daily Daily for 90 days starting 2/22/25   Yes Provider, MD Padmini   mometasone (NASONEX) 50 MCG/ACT nasal spray 2 sprays by Nasal route daily 2/12/25  Yes Provider, MD Padmini   ELIQUIS 5 MG TABS tablet Take 1 tablet by mouth 2 times daily 1/15/25  Yes Provider, MD Padmini   albuterol sulfate HFA (PROVENTIL;VENTOLIN;PROAIR) 108 (90 Base) MCG/ACT inhaler Inhale 2 puffs into the lungs every 4 hours as needed for Wheezing or Shortness of Breath   Yes Provider, MD Padmini   azelastine (ASTELIN) 0.1 % nasal spray 2 sprays by Nasal route 2 times daily as needed for Rhinitis Use in each nostril as directed   Yes Provider, MD Padmini   famotidine (PEPCID) 20 MG tablet Take 1 tablet by mouth 2 times daily   Yes Provider, MD Padmini   busPIRone (BUSPAR) 10 MG tablet Take 1 tablet by mouth 3 times daily as needed 12/1/24  Yes Provider, MD Padmini   ferrous sulfate (IRON 325) 325 (65 Fe) MG tablet Take 1 tablet by mouth 2 times daily 2/24/25  Yes Sixto Garner APRN - CNP   calcitRIOL (ROCALTROL) 0.25 MCG capsule Take 1 tablet by mouth daily 1/14/25  Yes Provider, MD Padmini   donepezil (ARICEPT) 10 MG tablet Take 1 tablet by mouth daily 10/17/24  Yes Provider, MD Padmini   oxyCODONE HCl (OXY-IR) 10 MG immediate release tablet Take 1 tablet by mouth 2 times daily as needed for Pain.   Yes Provider, MD Padmini   pantoprazole (PROTONIX) 40 MG tablet Take 1 tablet by mouth daily 1/3/25  Yes Provider, Padmini, MD   potassium

## 2025-06-07 LAB
ALBUMIN SERPL-MCNC: 3.5 G/DL (ref 3.5–5.2)
ALP SERPL-CCNC: 154 U/L (ref 40–129)
ALT SERPL-CCNC: <5 U/L (ref 10–50)
ANION GAP SERPL CALCULATED.3IONS-SCNC: 19 MMOL/L (ref 8–16)
AST SERPL-CCNC: 40 U/L (ref 10–50)
BACTERIA BLD CULT ORG #2: NORMAL
BACTERIA BLD CULT: NORMAL
BILIRUB SERPL-MCNC: 0.7 MG/DL (ref 0.2–1.2)
BILIRUB UR QL STRIP: ABNORMAL
BUN SERPL-MCNC: 52 MG/DL (ref 8–23)
CALCIUM SERPL-MCNC: 9.5 MG/DL (ref 8.8–10.2)
CHLORIDE SERPL-SCNC: 95 MMOL/L (ref 98–107)
CLARITY UR: ABNORMAL
CO2 SERPL-SCNC: 22 MMOL/L (ref 22–29)
COLOR UR: ABNORMAL
CREAT SERPL-MCNC: 4.2 MG/DL (ref 0.7–1.2)
ERYTHROCYTE [DISTWIDTH] IN BLOOD BY AUTOMATED COUNT: 14.3 % (ref 11.5–14.5)
GLUCOSE BLD-MCNC: 102 MG/DL (ref 70–99)
GLUCOSE BLD-MCNC: 131 MG/DL (ref 70–99)
GLUCOSE BLD-MCNC: 132 MG/DL (ref 70–99)
GLUCOSE BLD-MCNC: 176 MG/DL (ref 70–99)
GLUCOSE SERPL-MCNC: 148 MG/DL (ref 70–99)
GLUCOSE UR STRIP.AUTO-MCNC: NEGATIVE MG/DL
HCT VFR BLD AUTO: 37.1 % (ref 42–52)
HGB BLD-MCNC: 11.7 G/DL (ref 14–18)
HGB UR STRIP.AUTO-MCNC: NEGATIVE MG/L
HYALINE CASTS #/AREA URNS LPF: ABNORMAL /LPF (ref 0–5)
KETONES UR STRIP.AUTO-MCNC: ABNORMAL MG/DL
LEUKOCYTE ESTERASE UR QL STRIP.AUTO: ABNORMAL
MCH RBC QN AUTO: 28.7 PG (ref 27–31)
MCHC RBC AUTO-ENTMCNC: 31.5 G/DL (ref 33–37)
MCV RBC AUTO: 90.9 FL (ref 80–94)
NITRITE UR QL STRIP.AUTO: NEGATIVE
PERFORMED ON: ABNORMAL
PH UR STRIP.AUTO: 5 [PH] (ref 5–8)
PLATELET # BLD AUTO: 169 K/UL (ref 130–400)
PMV BLD AUTO: 12.3 FL (ref 9.4–12.4)
POTASSIUM SERPL-SCNC: 4.4 MMOL/L (ref 3.5–5.1)
PROT SERPL-MCNC: 7.7 G/DL (ref 6.4–8.3)
PROT UR STRIP.AUTO-MCNC: 30 MG/DL
RBC # BLD AUTO: 4.08 M/UL (ref 4.7–6.1)
RBC #/AREA URNS HPF: ABNORMAL /HPF (ref 0–2)
SODIUM SERPL-SCNC: 136 MMOL/L (ref 136–145)
SP GR UR STRIP.AUTO: 1.02 (ref 1–1.03)
SQUAMOUS #/AREA URNS HPF: ABNORMAL /HPF
UROBILINOGEN UR STRIP.AUTO-MCNC: 1 E.U./DL
WBC # BLD AUTO: 7.1 K/UL (ref 4.8–10.8)
WBC #/AREA URNS HPF: ABNORMAL /HPF (ref 0–5)
YEAST #/AREA URNS HPF: PRESENT /HPF

## 2025-06-07 PROCEDURE — 6360000002 HC RX W HCPCS: Performed by: SURGERY

## 2025-06-07 PROCEDURE — 1200000000 HC SEMI PRIVATE

## 2025-06-07 PROCEDURE — 6370000000 HC RX 637 (ALT 250 FOR IP): Performed by: INTERNAL MEDICINE

## 2025-06-07 PROCEDURE — 6370000000 HC RX 637 (ALT 250 FOR IP): Performed by: SURGERY

## 2025-06-07 PROCEDURE — 85027 COMPLETE CBC AUTOMATED: CPT

## 2025-06-07 PROCEDURE — 51701 INSERT BLADDER CATHETER: CPT

## 2025-06-07 PROCEDURE — 6360000002 HC RX W HCPCS

## 2025-06-07 PROCEDURE — 6370000000 HC RX 637 (ALT 250 FOR IP)

## 2025-06-07 PROCEDURE — 51798 US URINE CAPACITY MEASURE: CPT

## 2025-06-07 PROCEDURE — 81001 URINALYSIS AUTO W/SCOPE: CPT

## 2025-06-07 PROCEDURE — 80053 COMPREHEN METABOLIC PANEL: CPT

## 2025-06-07 PROCEDURE — 2500000003 HC RX 250 WO HCPCS: Performed by: ANESTHESIOLOGY

## 2025-06-07 PROCEDURE — 82962 GLUCOSE BLOOD TEST: CPT

## 2025-06-07 PROCEDURE — 36415 COLL VENOUS BLD VENIPUNCTURE: CPT

## 2025-06-07 PROCEDURE — 6360000002 HC RX W HCPCS: Performed by: INTERNAL MEDICINE

## 2025-06-07 PROCEDURE — 94760 N-INVAS EAR/PLS OXIMETRY 1: CPT

## 2025-06-07 PROCEDURE — 8010000000 HC HEMODIALYSIS ACUTE INPT

## 2025-06-07 RX ORDER — MIDODRINE HYDROCHLORIDE 5 MG/1
2.5 TABLET ORAL
Status: DISCONTINUED | OUTPATIENT
Start: 2025-06-07 | End: 2025-06-07

## 2025-06-07 RX ORDER — MIDODRINE HYDROCHLORIDE 5 MG/1
5 TABLET ORAL
Status: DISCONTINUED | OUTPATIENT
Start: 2025-06-07 | End: 2025-06-14 | Stop reason: HOSPADM

## 2025-06-07 RX ORDER — POLYETHYLENE GLYCOL 3350 17 G/17G
17 POWDER, FOR SOLUTION ORAL DAILY
Status: DISCONTINUED | OUTPATIENT
Start: 2025-06-07 | End: 2025-06-14 | Stop reason: HOSPADM

## 2025-06-07 RX ORDER — LACTULOSE 10 G/15ML
30 SOLUTION ORAL ONCE
Status: COMPLETED | OUTPATIENT
Start: 2025-06-07 | End: 2025-06-07

## 2025-06-07 RX ADMIN — DAKIN'S SOLUTION 0.125% (QUARTER STRENGTH): 0.12 SOLUTION at 14:12

## 2025-06-07 RX ADMIN — ESCITALOPRAM OXALATE 10 MG: 10 TABLET ORAL at 12:27

## 2025-06-07 RX ADMIN — DAKIN'S SOLUTION 0.125% (QUARTER STRENGTH): 0.12 SOLUTION at 22:59

## 2025-06-07 RX ADMIN — HEPARIN SODIUM 5000 UNITS: 5000 INJECTION INTRAVENOUS; SUBCUTANEOUS at 21:37

## 2025-06-07 RX ADMIN — SODIUM CHLORIDE, PRESERVATIVE FREE 10 ML: 5 INJECTION INTRAVENOUS at 12:31

## 2025-06-07 RX ADMIN — ROSUVASTATIN 10 MG: 10 TABLET, FILM COATED ORAL at 21:36

## 2025-06-07 RX ADMIN — Medication 10 MG: at 21:36

## 2025-06-07 RX ADMIN — DOCUSATE SODIUM 100 MG: 100 CAPSULE, LIQUID FILLED ORAL at 12:26

## 2025-06-07 RX ADMIN — FAMOTIDINE 20 MG: 20 TABLET, FILM COATED ORAL at 12:26

## 2025-06-07 RX ADMIN — PANTOPRAZOLE SODIUM 40 MG: 40 TABLET, DELAYED RELEASE ORAL at 12:26

## 2025-06-07 RX ADMIN — PREGABALIN 50 MG: 50 CAPSULE ORAL at 12:26

## 2025-06-07 RX ADMIN — LACTULOSE 30 G: 20 SOLUTION ORAL at 17:44

## 2025-06-07 RX ADMIN — HYDROCODONE BITARTRATE AND ACETAMINOPHEN 1 TABLET: 5; 325 TABLET ORAL at 15:35

## 2025-06-07 RX ADMIN — DONEPEZIL HYDROCHLORIDE 10 MG: 10 TABLET, FILM COATED ORAL at 12:26

## 2025-06-07 RX ADMIN — MIDODRINE HYDROCHLORIDE 5 MG: 5 TABLET ORAL at 12:27

## 2025-06-07 RX ADMIN — FERROUS SULFATE TAB 325 MG (65 MG ELEMENTAL FE) 325 MG: 325 (65 FE) TAB at 12:26

## 2025-06-07 RX ADMIN — DAKIN'S SOLUTION 0.125% (QUARTER STRENGTH): 0.12 SOLUTION at 03:43

## 2025-06-07 RX ADMIN — GLYCERIN 2 G: 2 SUPPOSITORY RECTAL at 12:34

## 2025-06-07 RX ADMIN — HEPARIN SODIUM 5000 UNITS: 5000 INJECTION INTRAVENOUS; SUBCUTANEOUS at 05:30

## 2025-06-07 RX ADMIN — HYDROCODONE BITARTRATE AND ACETAMINOPHEN 1 TABLET: 5; 325 TABLET ORAL at 21:43

## 2025-06-07 RX ADMIN — CLINDAMYCIN PHOSPHATE 900 MG: 900 INJECTION, SOLUTION INTRAVENOUS at 05:30

## 2025-06-07 RX ADMIN — MIDODRINE HYDROCHLORIDE 2.5 MG: 5 TABLET ORAL at 08:14

## 2025-06-07 RX ADMIN — CALCITRIOL CAPSULES 0.25 MCG 0.25 MCG: 0.25 CAPSULE ORAL at 12:27

## 2025-06-07 RX ADMIN — FERROUS SULFATE TAB 325 MG (65 MG ELEMENTAL FE) 325 MG: 325 (65 FE) TAB at 21:37

## 2025-06-07 RX ADMIN — CLINDAMYCIN PHOSPHATE 900 MG: 900 INJECTION, SOLUTION INTRAVENOUS at 15:39

## 2025-06-07 RX ADMIN — DOCUSATE SODIUM 100 MG: 100 CAPSULE, LIQUID FILLED ORAL at 21:36

## 2025-06-07 RX ADMIN — CLINDAMYCIN PHOSPHATE 900 MG: 900 INJECTION, SOLUTION INTRAVENOUS at 22:51

## 2025-06-07 RX ADMIN — HEPARIN SODIUM 5000 UNITS: 5000 INJECTION INTRAVENOUS; SUBCUTANEOUS at 13:31

## 2025-06-07 RX ADMIN — PREGABALIN 50 MG: 50 CAPSULE ORAL at 21:36

## 2025-06-07 RX ADMIN — MORPHINE SULFATE 2 MG: 2 INJECTION, SOLUTION INTRAMUSCULAR; INTRAVENOUS at 16:43

## 2025-06-07 RX ADMIN — INSULIN GLARGINE 10 UNITS: 100 INJECTION, SOLUTION SUBCUTANEOUS at 21:37

## 2025-06-07 RX ADMIN — ISOSORBIDE DINITRATE 20 MG: 10 TABLET ORAL at 17:44

## 2025-06-07 RX ADMIN — SODIUM CHLORIDE, PRESERVATIVE FREE 10 ML: 5 INJECTION INTRAVENOUS at 21:45

## 2025-06-07 RX ADMIN — HEPARIN SODIUM 3600 UNITS: 1000 INJECTION INTRAVENOUS; SUBCUTANEOUS at 11:54

## 2025-06-07 RX ADMIN — MIDODRINE HYDROCHLORIDE 5 MG: 5 TABLET ORAL at 17:44

## 2025-06-07 RX ADMIN — ONDANSETRON 4 MG: 2 INJECTION INTRAMUSCULAR; INTRAVENOUS at 09:55

## 2025-06-07 RX ADMIN — POLYETHYLENE GLYCOL 3350 17 G: 17 POWDER, FOR SOLUTION ORAL at 12:30

## 2025-06-07 RX ADMIN — METOPROLOL SUCCINATE 25 MG: 25 TABLET, EXTENDED RELEASE ORAL at 12:27

## 2025-06-07 ASSESSMENT — PAIN DESCRIPTION - LOCATION
LOCATION: FOOT
LOCATION: FOOT
LOCATION: NECK

## 2025-06-07 ASSESSMENT — PAIN DESCRIPTION - DESCRIPTORS
DESCRIPTORS: SORE
DESCRIPTORS: ACHING;DISCOMFORT;THROBBING;SHARP
DESCRIPTORS: ACHING;DISCOMFORT;THROBBING;SHARP

## 2025-06-07 ASSESSMENT — PAIN SCALES - GENERAL
PAINLEVEL_OUTOF10: 8
PAINLEVEL_OUTOF10: 3
PAINLEVEL_OUTOF10: 5
PAINLEVEL_OUTOF10: 9

## 2025-06-07 ASSESSMENT — PAIN DESCRIPTION - ORIENTATION
ORIENTATION: LEFT
ORIENTATION: LEFT

## 2025-06-07 NOTE — PROGRESS NOTES
Nephrology ([facility]) Progress Note    Patient:  Cholo Bhakta  YOB: 1955  Date of Service: 6/7/2025  MRN: [id number]   Acct: [id number]   Primary Care Physician: Nirmal Nuno MD  Advance Directive: Full Code  Admit Date: 6/2/2025       Hospital Day: 5  Referring Provider: Adrián Benz MD    Patient independently seen and examined, Chart, Consults, Notes, Operative notes, Labs, Cardiology, and Radiology studies reviewed as available.    Chief complaint: Abnormal labs.    Subjective:  Cholo Bhakta is a 69 y.o. male for whom we were consulted for evaluation and treatment of chronic kidney disease/progressed to end-stage renal disease.  Patient has multiple comorbid condition including morbid abdominal obesity, coronary artery disease, CABG, peripheral vascular disease, CVA, congestive heart failure, diabetic foot ulcer and hypertension.  He follows up in the office.  He has progressive decline in renal function however has refused dialysis treatment previously.  Presented to the emergency room complaining of fall, profound fatigue, lack of energy, sleepiness with poor appetite.  His wife tells me that he remains in the bed most of the time.  Routine lab data indicated serum creatinine now 5.1 mg with blood urea nitrogen more than 100 mg.  He now agreed to proceed with dialysis.  Patient also had pending arteriogram/runoff for the treatment of peripheral vascular disease affecting his bilateral lower extremities.  On June 4, he underwent permacatheter placement without any complications.    This morning patient is more alert and awake.  He was able to answer all the questions.  He has noticed more energy.  Today he is getting third hemodialysis treatment.    Currently seen on hemodialysis  Hemodialysis access: Permacath  Hemodialysis: 3-1/2-hour.  Ultrafiltration: 1500 cc  2K bath  Blood flow rate is 450 cc/min    Allergies:  Cefepime, Bactrim  CONTRAST  Result Date: 6/2/2025  CT CERVICAL SPINE WITHOUT CONTRAST  HISTORY:  Status post fall  TECHNIQUE:  CT of the cervical spine with multiplanar reformations.  FINDINGS:  Reformatted images demonstrate normal alignment with preservation of vertebral body height. Prior fusion C5 - C7.  No fracture seen on the axial or reformatted images. No acute surrounding soft tissue abnormalitites. Lung apices are clear.      No acute findings in the cervical spine.  All CT scans are performed using dose optimization techniques as appropriate to the performed exam and include at least one of the following: Automated exposure control, adjustment of the mA and/or kV according to size, and the use of iterative reconstruction technique.  ______________________________________ Electronically signed by: MICHAEL SMITH M.D. Date:     06/02/2025 Time:    05:19        Assessment   1.  End-stage renal disease/new onset of dialysis.  2.  Uremic manifestation.  3.  Type 2 diabetes with renal failure.  4.  History of peripheral vascular disease.  5.  Status post recent fall.  6.  Anemia of chronic kidney disease.  7.  Secondary hyperparathyroidism.  8.  Diabetic foot ulcer.  9.  Recurrent hyponatremia related to CHF and CKD      Plan:  1.  Tolerating hemodialysis very well.  Patient has problem with low blood pressure, corrected with midodrine 5 mg as needed.  He is currently on midodrine 5 mg 3 times of day.  2.  Discontinue tamsulosin.  3.  Next hemodialysis treatment on Tuesday, outpatient dialysis set up needed at [facility]  4.  Plan was discussed with the patient and dialysis staff    Shailesh Freed MD  06/07/25  11:04 AM

## 2025-06-07 NOTE — PROGRESS NOTES
06/07/25 0223   WBC 6.0 6.7 7.1   HGB 11.9* 12.0* 11.7*    166 169     Recent Labs     06/05/25 0422 06/06/25 0535 06/07/25 0223    137 136   K 3.5  3.5 3.8  3.8 4.4   CL 96* 94* 95*   CO2 29 24 22   * 73* 52*   CREATININE 4.1* 4.3* 4.2*   GLUCOSE 116* 172* 148*     Recent Labs     06/05/25 0422 06/06/25 0535 06/07/25 0223   AST 31 33 40   ALT 13 7* <5*   BILITOT 0.5 0.6 0.7   ALKPHOS 134* 148* 154*     UA:  No results for input(s): \"NITRITE\", \"COLORU\", \"PHUR\", \"LABCAST\", \"WBCUA\", \"RBCUA\", \"MUCUS\", \"TRICHOMONAS\", \"YEAST\", \"BACTERIA\", \"CLARITYU\", \"SPECGRAV\", \"LEUKOCYTESUR\", \"UROBILINOGEN\", \"BILIRUBINUR\", \"BLOODU\", \"GLUCOSEU\", \"AMORPHOUS\" in the last 72 hours.    Invalid input(s): \"KETONESU\"    RAD:   XR ELBOW LEFT (2 VIEWS)  Result Date: 6/2/2025  1.  No fracture or dislocation.    ______________________________________ Electronically signed by: CAROLINA CRAWFORD D.O. Date:     06/02/2025 Time:    06:24     XR CHEST PORTABLE  Result Date: 6/2/2025  Impression:  No acute cardiopulmonary disease   ______________________________________ Electronically signed by: MICHAEL SMITH M.D. Date:     06/02/2025 Time:    06:13     CT PELVIS WO CONTRAST Additional Contrast? None  Result Date: 6/2/2025  Impression: 1.  No acute findings of the bony pelvis or femoral hips.  All CT scans are performed using dose optimization techniques as appropriate to the performed exam and include at least one of the following: Automated exposure control, adjustment of the mA and/or kV according to size, and the use of iterative reconstruction technique.  ______________________________________ Electronically signed by: MICHAEL SMITH M.D. Date:     06/02/2025 Time:    05:25     CT LUMBAR SPINE WO CONTRAST  Result Date: 6/2/2025  Impression:  No acute findings  All CT scans are performed using dose optimization techniques as appropriate to the performed exam and include at least one of the following: Automated  exposure control, adjustment of the mA and/or kV according to size, and the use of iterative reconstruction technique.  ______________________________________ Electronically signed by: MICHAEL SMITH M.D. Date:     06/02/2025 Time:    05:23     CT HEAD WO CONTRAST  Result Date: 6/2/2025  1.  No acute intracranial abnormality.  All CT scans are performed using dose optimization techniques as appropriate to the performed exam and include at least one of the following: Automated exposure control, adjustment of the mA and/or kV according to size, and the use of iterative reconstruction technique.  ______________________________________ Electronically signed by: MICHAEL SMITH M.D. Date:     06/02/2025 Time:    05:21     CT CERVICAL SPINE WO CONTRAST  Result Date: 6/2/2025  No acute findings in the cervical spine.  All CT scans are performed using dose optimization techniques as appropriate to the performed exam and include at least one of the following: Automated exposure control, adjustment of the mA and/or kV according to size, and the use of iterative reconstruction technique.  ______________________________________ Electronically signed by: MICHAEL SMITH M.D. Date:     06/02/2025 Time:    05:19     Assessment/Plan   Principal Problem:    SANGEETA (acute kidney injury) /  CKD (chronic kidney disease) stage 4, GFR 15-29 ml/min       - Nephrology following              -s/p permacath placement 6/4   Dialysis today               - Monitor I's and O's closely              - Monitor labs closely              - Avoid hypotension              - Avoid nephrotoxic agents  Active Problems:  Metabolic encephalopathy   Multifactorial, improving   -CT head no acute intracranial abnormality   -CXR no acute cardiopulmonary process   Urinalysis   -follow renal fxn/lytes  -follow blood glucose  -monitor hemodynamics  -avoid offending agents  -neuro checks   Diabetic ulcer of left foot associated with type 2 diabetes

## 2025-06-07 NOTE — PLAN OF CARE
lab/diagnostic results   Monitor all insertion sites i.e., indwelling lines, tubes and drains   Monitor endotracheal (as able) and nasal secretions for changes in amount and color   Lyndora appropriate cooling/warming therapies per order   Administer medications as ordered   Instruct and encourage patient and family to use good hand hygiene technique   Identify and instruct in appropriate isolation precautions for identified infection/condition  6/6/2025 1737 by Sarah Beth Mckay RN  Outcome: Not Progressing     Problem: Metabolic/Fluid and Electrolytes - Adult  Goal: Electrolytes maintained within normal limits  6/7/2025 0133 by Cata Harp RN  Outcome: Progressing  Flowsheets (Taken 6/7/2025 0026)  Electrolytes maintained within normal limits:   Monitor labs and assess patient for signs and symptoms of electrolyte imbalances   Administer electrolyte replacement as ordered   Monitor response to electrolyte replacements, including repeat lab results as appropriate   Fluid restriction as ordered   Instruct patient on fluid and nutrition restrictions as appropriate  6/6/2025 1737 by Sarah Beth Mckay RN  Outcome: Not Progressing  Goal: Hemodynamic stability and optimal renal function maintained  6/7/2025 0133 by Cata Harp RN  Outcome: Progressing  Flowsheets (Taken 6/7/2025 0026)  Hemodynamic stability and optimal renal function maintained:   Monitor labs and assess for signs and symptoms of volume excess or deficit   Monitor intake, output and patient weight   Monitor urine specific gravity, serum osmolarity and serum sodium as indicated or ordered   Monitor response to interventions for patient's volume status, including labs, urine output, blood pressure (other measures as available)   Encourage oral intake as appropriate   Instruct patient on fluid and nutrition restrictions as appropriate  6/6/2025 1737 by Sarah Beth Mckay RN  Outcome: Not Progressing  Goal: Glucose maintained within prescribed  Progressing  Flowsheets (Taken 6/7/2025 0026)  Skin Integrity Remains Intact:   Monitor for areas of redness and/or skin breakdown   Assess vascular access sites hourly   Every 4-6 hours minimum:  Change oxygen saturation probe site   Every 4-6 hours:  If on nasal continuous positive airway pressure, assess nares and determine need for appliance change or resting period   Turn and reposition as indicated   Assess need for specialty bed   Positioning devices   Pressure redistribution bed/mattress (bed type)   Check visual cues for pain  6/6/2025 1737 by Sarah Beth Mckay RN  Outcome: Not Progressing  Goal: Incisions, wounds, or drain sites healing without S/S of infection  6/7/2025 0133 by Cata Harp, RN  Outcome: Progressing  Flowsheets (Taken 6/7/2025 0026)  Incisions, Wounds, or Drain Sites Healing Without Sign and Symptoms of Infection:   ADMISSION and DAILY: Assess and document risk factors for pressure ulcer development   TWICE DAILY: Assess and document skin integrity   TWICE DAILY: Assess and document dressing/incision, wound bed, drain sites and surrounding tissue   Implement wound care per orders   Initiate pressure ulcer prevention bundle as indicated   Initiate isolation precautions as appropriate  6/6/2025 1737 by Sarah Beth Mckay RN  Outcome: Not Progressing  Goal: Oral mucous membranes remain intact  6/7/2025 0133 by Cata Harp RN  Outcome: Progressing  Flowsheets (Taken 6/7/2025 0026)  Oral Mucous Membranes Remain Intact:   Assess oral mucosa and hygiene practices   Implement preventative oral hygiene regimen   Implement oral medicated treatments as ordered  6/6/2025 1737 by Sarah Beth Mckay RN  Outcome: Not Progressing     Problem: Musculoskeletal - Adult  Goal: Return mobility to safest level of function  6/7/2025 0133 by Cata Harp, RN  Outcome: Progressing  Flowsheets (Taken 6/7/2025 0026)  Return Mobility to Safest Level of Function:   Assess patient stability and activity

## 2025-06-07 NOTE — DIALYSIS
[facility] INPATIENT SERVICES  DIALYSIS TREATMENT SUMMARY      Note: Consult with the attending physician for patient treatment orders, this document is not a physician order.      Patient Information   Patient Cholo Bhakta   Date of Birth June 18, 1955   Chart Number [id number]   Location [facility] MRN [id number]   Gender Male   SSN (last 4) [id number]     Treatment Information   Treatment Type Hemodialysis   Treatment Id [id number]   Start Time June 07, 2025 08:04   End Time June 07, 2025 11:40   Acutal Duration 03:36     Treatment Balances   Total Saline Administered 500   Net Fluid Balance -1100    Hemodialysis Orders   Therapy Standard   Orders Verified Time 06/07/2025 07:59    Date Verified 06/07/2025   Duration 3:30   Isolated UF/Bypass No   BFR (mL) 450   DFR (mL) 700   Dialyzer Type OPTIFLUX 160NR   UF Order UF Range   UF Range (mL) 500 - 2000   Crit-Line used No   Heparin Initial Units Bolus No   Heparin IV Maintenance Bolus No   Heparin IV Infusion No   Potassium (mEq/L) 2   Calcium (mEq/L) 2.5   Bicarb (mg/dL) 35   Sodium (mEq/L) 137   Clinician Lynda Tse, PHILOMENA    Dialysis Access   Access Type Central Venous Catheter   Central Venous Catheter   Access Type Catheter - Tunneled   Date Central Venous Catheter Placed 06/04/2025   Access Location Chest Wall - R   Current/New [facility] Patient Yes   Surgeon Dr. Lyons   1st Use Catheter Verified by Previous Use   Catheter Care Completed per Policy Yes   Dressing Dry and Intact on Arrival Yes   Dressing Changed No   Type of Dressing Film Biopatch/CHG   Last Date Changed 06/06/2025   If No select Reason Dressing Change Not Indicated per Policy   Type of HD Caps Curos   HD Caps Changed Yes   CVC Line Education Provided Yes - Infection Prevention      Vitals   Pre-Treatment Vitals   Time Is BP being recorded? Pre BP Map BP Method Pulse RR Temp How was Weight Obtained? Pre Weight Previous Dry Weight Previous Post Weight Metric Target Fluid    Does the patient have pain? No Skin Signed By Lynda Tse RN   Respiratory Skin Warm

## 2025-06-07 NOTE — PLAN OF CARE
infection/condition     Problem: Metabolic/Fluid and Electrolytes - Adult  Goal: Electrolytes maintained within normal limits  6/7/2025 1411 by Catarina Glover RN  Outcome: Progressing  6/7/2025 0133 by Cata Harp RN  Outcome: Progressing  Flowsheets (Taken 6/7/2025 0026)  Electrolytes maintained within normal limits:   Monitor labs and assess patient for signs and symptoms of electrolyte imbalances   Administer electrolyte replacement as ordered   Monitor response to electrolyte replacements, including repeat lab results as appropriate   Fluid restriction as ordered   Instruct patient on fluid and nutrition restrictions as appropriate  Goal: Hemodynamic stability and optimal renal function maintained  6/7/2025 1411 by Catarina Glover RN  Outcome: Progressing  6/7/2025 0133 by Cata Harp RN  Outcome: Progressing  Flowsheets (Taken 6/7/2025 0026)  Hemodynamic stability and optimal renal function maintained:   Monitor labs and assess for signs and symptoms of volume excess or deficit   Monitor intake, output and patient weight   Monitor urine specific gravity, serum osmolarity and serum sodium as indicated or ordered   Monitor response to interventions for patient's volume status, including labs, urine output, blood pressure (other measures as available)   Encourage oral intake as appropriate   Instruct patient on fluid and nutrition restrictions as appropriate  Goal: Glucose maintained within prescribed range  6/7/2025 1411 by Catarina Glover RN  Outcome: Progressing  6/7/2025 0133 by Cata Harp RN  Outcome: Progressing  Flowsheets (Taken 6/7/2025 0026)  Glucose maintained within prescribed range: Assess for signs and symptoms of hyperglycemia and hypoglycemia     Problem: Hematologic - Adult  Goal: Maintains hematologic stability  6/7/2025 1411 by Catarina Glover RN  Outcome: Progressing  6/7/2025 0133 by Cata Harp RN  Outcome: Progressing  Flowsheets (Taken 6/7/2025 0026)  Maintains hematologic  and initiate Spiritual Care, Psychosocial Clinical Specialist consults as needed     Problem: Decision Making  Goal: Pt/Family able to effectively weigh alternatives and participate in decision making related to treatment and care  Description: INTERVENTIONS:1. Determine when there are differences between patient's view, family's view, and healthcare provider's view of condition2. Facilitate patient and family articulation of goals for care3. Help patient and family identify pros/cons of alternative solutions4. Provide information as requested by patient/family5. Respect patient/family right to receive or not to receive information6. Serve as a liaison between patient and family and health care team7. Initiate Consults from Ethics, Palliative Care or initiate Family Care Conference as is appropriate  6/7/2025 1411 by Catarina Glover, RN  Outcome: Progressing  6/7/2025 0133 by Cata Harp, RN  Outcome: Progressing  Flowsheets (Taken 6/7/2025 0026)  Patient/family able to effectively weigh alternatives and participate in decision making related to treatment and care:   Determine when there are differences between patient's view, family's view, and healthcare provider's view of condition   Facilitate patient and family articulation of goals for care   Help patient and family identify pros/cons of alternative solutions   Provide information as requested by patient/family   Respect patient/family right to receive or not to receive information   Serve as a liaison between patient and family and health care team   Initiate Consults from Ethics, Palliative Care or initiate Family Care Conference as is appropriate

## 2025-06-08 ENCOUNTER — APPOINTMENT (OUTPATIENT)
Dept: GENERAL RADIOLOGY | Age: 70
End: 2025-06-08
Payer: MEDICARE

## 2025-06-08 LAB
ALBUMIN SERPL-MCNC: 3.7 G/DL (ref 3.5–5.2)
ALP SERPL-CCNC: 151 U/L (ref 40–129)
ALT SERPL-CCNC: 7 U/L (ref 10–50)
ANION GAP SERPL CALCULATED.3IONS-SCNC: 18 MMOL/L (ref 8–16)
AST SERPL-CCNC: 36 U/L (ref 10–50)
BILIRUB SERPL-MCNC: 0.7 MG/DL (ref 0.2–1.2)
BUN SERPL-MCNC: 35 MG/DL (ref 8–23)
CALCIUM SERPL-MCNC: 9.4 MG/DL (ref 8.8–10.2)
CHLORIDE SERPL-SCNC: 98 MMOL/L (ref 98–107)
CO2 SERPL-SCNC: 24 MMOL/L (ref 22–29)
CREAT SERPL-MCNC: 3.9 MG/DL (ref 0.7–1.2)
ERYTHROCYTE [DISTWIDTH] IN BLOOD BY AUTOMATED COUNT: 14.5 % (ref 11.5–14.5)
GLUCOSE BLD-MCNC: 118 MG/DL (ref 70–99)
GLUCOSE BLD-MCNC: 120 MG/DL (ref 70–99)
GLUCOSE BLD-MCNC: 130 MG/DL (ref 70–99)
GLUCOSE BLD-MCNC: 141 MG/DL (ref 70–99)
GLUCOSE SERPL-MCNC: 150 MG/DL (ref 70–99)
HCT VFR BLD AUTO: 38.2 % (ref 42–52)
HGB BLD-MCNC: 12.3 G/DL (ref 14–18)
MCH RBC QN AUTO: 29 PG (ref 27–31)
MCHC RBC AUTO-ENTMCNC: 32.2 G/DL (ref 33–37)
MCV RBC AUTO: 90.1 FL (ref 80–94)
PERFORMED ON: ABNORMAL
PLATELET # BLD AUTO: 146 K/UL (ref 130–400)
PMV BLD AUTO: 12.2 FL (ref 9.4–12.4)
POTASSIUM SERPL-SCNC: 3.9 MMOL/L (ref 3.5–5.1)
PROT SERPL-MCNC: 7.4 G/DL (ref 6.4–8.3)
RBC # BLD AUTO: 4.24 M/UL (ref 4.7–6.1)
SODIUM SERPL-SCNC: 140 MMOL/L (ref 136–145)
WBC # BLD AUTO: 8.5 K/UL (ref 4.8–10.8)

## 2025-06-08 PROCEDURE — 6370000000 HC RX 637 (ALT 250 FOR IP)

## 2025-06-08 PROCEDURE — 6370000000 HC RX 637 (ALT 250 FOR IP): Performed by: SURGERY

## 2025-06-08 PROCEDURE — 1200000000 HC SEMI PRIVATE

## 2025-06-08 PROCEDURE — 2500000003 HC RX 250 WO HCPCS: Performed by: ANESTHESIOLOGY

## 2025-06-08 PROCEDURE — 94760 N-INVAS EAR/PLS OXIMETRY 1: CPT

## 2025-06-08 PROCEDURE — 6360000002 HC RX W HCPCS

## 2025-06-08 PROCEDURE — 6370000000 HC RX 637 (ALT 250 FOR IP): Performed by: INTERNAL MEDICINE

## 2025-06-08 PROCEDURE — 85027 COMPLETE CBC AUTOMATED: CPT

## 2025-06-08 PROCEDURE — 6360000002 HC RX W HCPCS: Performed by: SURGERY

## 2025-06-08 PROCEDURE — 51798 US URINE CAPACITY MEASURE: CPT

## 2025-06-08 PROCEDURE — 74018 RADEX ABDOMEN 1 VIEW: CPT

## 2025-06-08 PROCEDURE — 80053 COMPREHEN METABOLIC PANEL: CPT

## 2025-06-08 PROCEDURE — 82962 GLUCOSE BLOOD TEST: CPT

## 2025-06-08 PROCEDURE — 36415 COLL VENOUS BLD VENIPUNCTURE: CPT

## 2025-06-08 RX ORDER — METOCLOPRAMIDE HYDROCHLORIDE 5 MG/ML
5 INJECTION INTRAMUSCULAR; INTRAVENOUS EVERY 6 HOURS
Status: DISCONTINUED | OUTPATIENT
Start: 2025-06-08 | End: 2025-06-12

## 2025-06-08 RX ORDER — LACTULOSE 10 G/15ML
20 SOLUTION ORAL 3 TIMES DAILY
Status: DISCONTINUED | OUTPATIENT
Start: 2025-06-08 | End: 2025-06-14 | Stop reason: HOSPADM

## 2025-06-08 RX ORDER — METOCLOPRAMIDE HYDROCHLORIDE 5 MG/ML
10 INJECTION INTRAMUSCULAR; INTRAVENOUS EVERY 6 HOURS
Status: DISCONTINUED | OUTPATIENT
Start: 2025-06-08 | End: 2025-06-08 | Stop reason: DRUGHIGH

## 2025-06-08 RX ADMIN — ONDANSETRON 4 MG: 2 INJECTION INTRAMUSCULAR; INTRAVENOUS at 16:21

## 2025-06-08 RX ADMIN — DOCUSATE SODIUM 100 MG: 100 CAPSULE, LIQUID FILLED ORAL at 21:23

## 2025-06-08 RX ADMIN — PREGABALIN 50 MG: 50 CAPSULE ORAL at 09:27

## 2025-06-08 RX ADMIN — HEPARIN SODIUM 5000 UNITS: 5000 INJECTION INTRAVENOUS; SUBCUTANEOUS at 06:29

## 2025-06-08 RX ADMIN — ROSUVASTATIN 10 MG: 10 TABLET, FILM COATED ORAL at 21:23

## 2025-06-08 RX ADMIN — PREGABALIN 50 MG: 50 CAPSULE ORAL at 21:23

## 2025-06-08 RX ADMIN — ESCITALOPRAM OXALATE 10 MG: 10 TABLET ORAL at 09:27

## 2025-06-08 RX ADMIN — DAKIN'S SOLUTION 0.125% (QUARTER STRENGTH): 0.12 SOLUTION at 10:49

## 2025-06-08 RX ADMIN — CLINDAMYCIN PHOSPHATE 900 MG: 900 INJECTION, SOLUTION INTRAVENOUS at 15:08

## 2025-06-08 RX ADMIN — DAKIN'S SOLUTION 0.125% (QUARTER STRENGTH): 0.12 SOLUTION at 21:32

## 2025-06-08 RX ADMIN — FERROUS SULFATE TAB 325 MG (65 MG ELEMENTAL FE) 325 MG: 325 (65 FE) TAB at 09:27

## 2025-06-08 RX ADMIN — LACTULOSE 20 G: 20 SOLUTION ORAL at 21:23

## 2025-06-08 RX ADMIN — POLYETHYLENE GLYCOL 3350 17 G: 17 POWDER, FOR SOLUTION ORAL at 09:27

## 2025-06-08 RX ADMIN — DOCUSATE SODIUM 100 MG: 100 CAPSULE, LIQUID FILLED ORAL at 09:27

## 2025-06-08 RX ADMIN — LEVOFLOXACIN 500 MG: 500 INJECTION, SOLUTION INTRAVENOUS at 13:49

## 2025-06-08 RX ADMIN — CALCITRIOL CAPSULES 0.25 MCG 0.25 MCG: 0.25 CAPSULE ORAL at 09:27

## 2025-06-08 RX ADMIN — PANTOPRAZOLE SODIUM 40 MG: 40 TABLET, DELAYED RELEASE ORAL at 09:27

## 2025-06-08 RX ADMIN — MIDODRINE HYDROCHLORIDE 5 MG: 5 TABLET ORAL at 18:20

## 2025-06-08 RX ADMIN — METOCLOPRAMIDE HYDROCHLORIDE 5 MG: 5 INJECTION INTRAMUSCULAR; INTRAVENOUS at 13:44

## 2025-06-08 RX ADMIN — CLINDAMYCIN PHOSPHATE 900 MG: 900 INJECTION, SOLUTION INTRAVENOUS at 06:33

## 2025-06-08 RX ADMIN — SODIUM CHLORIDE, PRESERVATIVE FREE 10 ML: 5 INJECTION INTRAVENOUS at 21:21

## 2025-06-08 RX ADMIN — HEPARIN SODIUM 5000 UNITS: 5000 INJECTION INTRAVENOUS; SUBCUTANEOUS at 15:05

## 2025-06-08 RX ADMIN — MIDODRINE HYDROCHLORIDE 5 MG: 5 TABLET ORAL at 09:27

## 2025-06-08 RX ADMIN — LACTULOSE 20 G: 20 SOLUTION ORAL at 16:21

## 2025-06-08 RX ADMIN — MORPHINE SULFATE 2 MG: 2 INJECTION, SOLUTION INTRAMUSCULAR; INTRAVENOUS at 10:34

## 2025-06-08 RX ADMIN — FERROUS SULFATE TAB 325 MG (65 MG ELEMENTAL FE) 325 MG: 325 (65 FE) TAB at 21:23

## 2025-06-08 RX ADMIN — FAMOTIDINE 20 MG: 20 TABLET, FILM COATED ORAL at 09:27

## 2025-06-08 RX ADMIN — HEPARIN SODIUM 5000 UNITS: 5000 INJECTION INTRAVENOUS; SUBCUTANEOUS at 21:22

## 2025-06-08 RX ADMIN — DONEPEZIL HYDROCHLORIDE 10 MG: 10 TABLET, FILM COATED ORAL at 09:27

## 2025-06-08 RX ADMIN — Medication 10 MG: at 21:23

## 2025-06-08 RX ADMIN — SODIUM CHLORIDE, PRESERVATIVE FREE 10 ML: 5 INJECTION INTRAVENOUS at 09:31

## 2025-06-08 RX ADMIN — MIDODRINE HYDROCHLORIDE 5 MG: 5 TABLET ORAL at 12:56

## 2025-06-08 RX ADMIN — CLINDAMYCIN PHOSPHATE 900 MG: 900 INJECTION, SOLUTION INTRAVENOUS at 21:30

## 2025-06-08 RX ADMIN — METOCLOPRAMIDE HYDROCHLORIDE 5 MG: 5 INJECTION INTRAMUSCULAR; INTRAVENOUS at 21:21

## 2025-06-08 RX ADMIN — ISOSORBIDE DINITRATE 20 MG: 10 TABLET ORAL at 09:28

## 2025-06-08 RX ADMIN — METOPROLOL SUCCINATE 25 MG: 25 TABLET, EXTENDED RELEASE ORAL at 09:27

## 2025-06-08 RX ADMIN — ONDANSETRON 4 MG: 2 INJECTION INTRAMUSCULAR; INTRAVENOUS at 10:20

## 2025-06-08 ASSESSMENT — PAIN DESCRIPTION - LOCATION: LOCATION: FOOT

## 2025-06-08 ASSESSMENT — PAIN SCALES - GENERAL: PAINLEVEL_OUTOF10: 9

## 2025-06-08 ASSESSMENT — PAIN DESCRIPTION - DESCRIPTORS: DESCRIPTORS: THROBBING;TENDER

## 2025-06-08 ASSESSMENT — PAIN DESCRIPTION - ORIENTATION: ORIENTATION: LEFT

## 2025-06-08 ASSESSMENT — PAIN - FUNCTIONAL ASSESSMENT: PAIN_FUNCTIONAL_ASSESSMENT: PREVENTS OR INTERFERES WITH MANY ACTIVE NOT PASSIVE ACTIVITIES

## 2025-06-08 NOTE — PROGRESS NOTES
0629    glucose chewable tablet 16 g  4 tablet Oral PRN Aj Lyons MD        dextrose bolus 10% 125 mL  125 mL IntraVENous PRN Aj Lyons MD        Or    dextrose bolus 10% 250 mL  250 mL IntraVENous PRN Aj Lyons MD        glucagon injection 1 mg  1 mg SubCUTAneous PRN Aj Lyons MD        dextrose 10 % infusion   IntraVENous Continuous PRN Aj Lyons MD        insulin lispro (HUMALOG,ADMELOG) injection vial 0-4 Units  0-4 Units SubCUTAneous 4x Daily AC & HS Aj Lyons MD   2 Units at 06/05/25 2042    insulin glargine (LANTUS) injection vial 10 Units  10 Units SubCUTAneous BID Aj Lyons MD   10 Units at 06/07/25 2137       Past Medical History:  Past Medical History:   Diagnosis Date    CHF (congestive heart failure) (HCC)     Chronic kidney disease     Diabetes mellitus (HCC)     Hyperlipidemia     Hypertension     Palliative care patient 03/07/2025       Past Surgical History:  Past Surgical History:   Procedure Laterality Date    CORONARY ARTERY BYPASS GRAFT      VASCULAR SURGERY N/A 6/4/2025    PERMCATH INSERTION performed by Aj [facility]ons MD at [facility] OR       Family History  History reviewed. No pertinent family history.    Social History  Social History     Socioeconomic History    Marital status:      Spouse name: Not on file    Number of children: Not on file    Years of education: Not on file    Highest education level: Not on file   Occupational History    Not on file   Tobacco Use    Smoking status: Never    Smokeless tobacco: Never   Substance and Sexual Activity    Alcohol use: Not Currently    Drug use: Never    Sexual activity: Not on file   Other Topics Concern    Not on file   Social History Narrative    Not on file     Social Drivers of Health     Financial Resource Strain: Low Risk  (4/2/2025)    Overall Financial Resource Strain (CARDIA)     Difficulty of Paying Living Expenses: Not hard at all   Food Insecurity: No Food Insecurity

## 2025-06-08 NOTE — PROGRESS NOTES
Pharmacy Renal Adjustment    Cholo Bhakta is a 69 y.o. male. Pharmacy has renally adjusted medications per protocol.    Recent Labs     06/07/25  0223 06/08/25  0151   BUN 52* 35*       Recent Labs     06/07/25  0223 06/08/25  0151   CREATININE 4.2* 3.9*       Estimated Creatinine Clearance: 25 mL/min (A) (based on SCr of 3.9 mg/dL (H)).    Height:   Ht Readings from Last 1 Encounters:   06/03/25 1.829 m (6' 0.01\")     Weight:  Wt Readings from Last 1 Encounters:   06/03/25 125.3 kg (276 lb 4.8 oz)       CKD stage: HD    Plan: Adjust the following medications based on renal function:           Metoclopramide 10mg IV q6h --> metoclopramide 5mg IV q6h    Electronically signed by Katja Rose RPH on 6/8/2025 at 1:29 PM

## 2025-06-08 NOTE — PROGRESS NOTES
agents  -neuro checks   Diabetic ulcer of left foot associated with type 2 diabetes mellitus  -Accucheck  -A1c 10.5  -Sliding scale insulin   Humalog 7 units with meals   Lantus 10 units twice daily   -Hypoglycemia protocol as warranted  Left foot chronic ulceration   Plan for outpatient arteriogram per vascular   Wound care following  Worsening odor, maceration to jeevan-wound wet to dry dressing now in place   Podiatry to follow plan to see on Monday  CT foot cellulitis, without definite drainable abscess, chronic appearing erosions without definite acute osteomyelitis  IV antibiotics Levaquin & Clindamycin   Chronic systolic heart failure   No signs of overload    Toprol XL   Hold Bumex, Zaroxolyn    CAD (coronary artery disease)  - Cardiology plan for outpatient ischemic workup  -  Lipitor  -increased Isordil, no recurrent issues of chest pain  - ECHO from March reviewed   - Trend troponin 144 with repeat 137  - Serial and PRN EKGs  - Monitor on telemetry   Chronic obstructive pulmonary disease    No signs of exacerbation   Atrial fibrillation    Currently rate controlled    Toprol XL    Monitor on telemetry   Essential hypertension   Monitor vital signs    Memory loss   Improving   Aricept   Constipation    Has been passing flatus, no abdominal pain   Scheduled bowel regimen ordered, added Lactulose today    Enema X1 if no results this afternoon     DVT Prophylaxis: Heparin subcutaneously     Disposition  following referral sent to [facility] 6/6      CORI Short - CNP, 6/8/2025 10:34 AM

## 2025-06-08 NOTE — PLAN OF CARE
Problem: Chronic Conditions and Co-morbidities  Goal: Patient's chronic conditions and co-morbidity symptoms are monitored and maintained or improved  6/8/2025 0938 by Catarina Glover RN  Outcome: Progressing  6/8/2025 0040 by Ailin Walton RN  Outcome: Progressing     Problem: Discharge Planning  Goal: Discharge to home or other facility with appropriate resources  6/8/2025 0938 by Catarina Glover RN  Outcome: Progressing  6/8/2025 0040 by Ailin Walton RN  Outcome: Progressing     Problem: Safety - Adult  Goal: Free from fall injury  6/8/2025 0938 by Catarina Glover RN  Outcome: Progressing  6/8/2025 0040 by Ailin Walton RN  Outcome: Progressing     Problem: Skin/Tissue Integrity  Goal: Skin integrity remains intact  Description: 1.  Monitor for areas of redness and/or skin breakdown2.  Assess vascular access sites hourly3.  Every 4-6 hours minimum:  Change oxygen saturation probe site4.  Every 4-6 hours:  If on nasal continuous positive airway pressure, respiratory therapy assess nares and determine need for appliance change or resting period  6/8/2025 0938 by Catarina Glover RN  Outcome: Progressing  6/8/2025 0040 by Ailin Walton RN  Outcome: Progressing     Problem: Pain  Goal: Verbalizes/displays adequate comfort level or baseline comfort level  6/8/2025 0938 by Catarina Glover RN  Outcome: Progressing  6/8/2025 0040 by Ailin Walton RN  Outcome: Progressing     Problem: Neurosensory - Adult  Goal: Achieves stable or improved neurological status  6/8/2025 0938 by Catarina Glover RN  Outcome: Progressing  6/8/2025 0040 by Ailin Walton RN  Outcome: Progressing     Problem: Respiratory - Adult  Goal: Achieves optimal ventilation and oxygenation  6/8/2025 0938 by Catarina Glover RN  Outcome: Progressing  6/8/2025 0040 by Ailin Walton RN  Outcome: Progressing     Problem: Cardiovascular - Adult  Goal: Maintains optimal cardiac output and hemodynamic stability  6/8/2025 0938 by Catarina Glover RN  Outcome:

## 2025-06-09 LAB
ALBUMIN SERPL-MCNC: 3.7 G/DL (ref 3.5–5.2)
ALP SERPL-CCNC: 148 U/L (ref 40–129)
ALT SERPL-CCNC: 12 U/L (ref 10–50)
ANION GAP SERPL CALCULATED.3IONS-SCNC: 21 MMOL/L (ref 8–16)
AST SERPL-CCNC: 36 U/L (ref 10–50)
BASOPHILS # BLD: 0.1 K/UL (ref 0–0.2)
BASOPHILS NFR BLD: 1 % (ref 0–1)
BILIRUB SERPL-MCNC: 0.7 MG/DL (ref 0.2–1.2)
BUN SERPL-MCNC: 49 MG/DL (ref 8–23)
CALCIUM SERPL-MCNC: 8.8 MG/DL (ref 8.8–10.2)
CHLORIDE SERPL-SCNC: 96 MMOL/L (ref 98–107)
CO2 SERPL-SCNC: 23 MMOL/L (ref 22–29)
CREAT SERPL-MCNC: 5.3 MG/DL (ref 0.7–1.2)
EOSINOPHIL # BLD: 0.3 K/UL (ref 0–0.6)
EOSINOPHIL NFR BLD: 4.9 % (ref 0–5)
ERYTHROCYTE [DISTWIDTH] IN BLOOD BY AUTOMATED COUNT: 14.6 % (ref 11.5–14.5)
GLUCOSE BLD-MCNC: 120 MG/DL (ref 70–99)
GLUCOSE BLD-MCNC: 194 MG/DL (ref 70–99)
GLUCOSE BLD-MCNC: 241 MG/DL (ref 70–99)
GLUCOSE BLD-MCNC: 241 MG/DL (ref 70–99)
GLUCOSE SERPL-MCNC: 118 MG/DL (ref 70–99)
HCT VFR BLD AUTO: 38 % (ref 42–52)
HGB BLD-MCNC: 12.2 G/DL (ref 14–18)
IMM GRANULOCYTES # BLD: 0.1 K/UL
LYMPHOCYTES # BLD: 1.2 K/UL (ref 1.1–4.5)
LYMPHOCYTES NFR BLD: 17.1 % (ref 20–40)
MCH RBC QN AUTO: 28.7 PG (ref 27–31)
MCHC RBC AUTO-ENTMCNC: 32.1 G/DL (ref 33–37)
MCV RBC AUTO: 89.4 FL (ref 80–94)
MONOCYTES # BLD: 1.1 K/UL (ref 0–0.9)
MONOCYTES NFR BLD: 15.6 % (ref 0–10)
NEUTROPHILS # BLD: 4.1 K/UL (ref 1.5–7.5)
NEUTS SEG NFR BLD: 60.7 % (ref 50–65)
PERFORMED ON: ABNORMAL
PLATELET # BLD AUTO: 161 K/UL (ref 130–400)
PMV BLD AUTO: 12.2 FL (ref 9.4–12.4)
POTASSIUM SERPL-SCNC: 3.9 MMOL/L (ref 3.5–5)
POTASSIUM SERPL-SCNC: 3.9 MMOL/L (ref 3.5–5.1)
PROT SERPL-MCNC: 6.7 G/DL (ref 6.4–8.3)
RBC # BLD AUTO: 4.25 M/UL (ref 4.7–6.1)
SODIUM SERPL-SCNC: 140 MMOL/L (ref 136–145)
WBC # BLD AUTO: 6.8 K/UL (ref 4.8–10.8)

## 2025-06-09 PROCEDURE — 97530 THERAPEUTIC ACTIVITIES: CPT

## 2025-06-09 PROCEDURE — 51701 INSERT BLADDER CATHETER: CPT

## 2025-06-09 PROCEDURE — 6370000000 HC RX 637 (ALT 250 FOR IP): Performed by: SURGERY

## 2025-06-09 PROCEDURE — 6370000000 HC RX 637 (ALT 250 FOR IP): Performed by: STUDENT IN AN ORGANIZED HEALTH CARE EDUCATION/TRAINING PROGRAM

## 2025-06-09 PROCEDURE — 6360000002 HC RX W HCPCS

## 2025-06-09 PROCEDURE — 6370000000 HC RX 637 (ALT 250 FOR IP): Performed by: INTERNAL MEDICINE

## 2025-06-09 PROCEDURE — 36415 COLL VENOUS BLD VENIPUNCTURE: CPT

## 2025-06-09 PROCEDURE — 80053 COMPREHEN METABOLIC PANEL: CPT

## 2025-06-09 PROCEDURE — 2500000003 HC RX 250 WO HCPCS: Performed by: ANESTHESIOLOGY

## 2025-06-09 PROCEDURE — 6360000002 HC RX W HCPCS: Performed by: SURGERY

## 2025-06-09 PROCEDURE — 82962 GLUCOSE BLOOD TEST: CPT

## 2025-06-09 PROCEDURE — 51798 US URINE CAPACITY MEASURE: CPT

## 2025-06-09 PROCEDURE — 85025 COMPLETE CBC W/AUTO DIFF WBC: CPT

## 2025-06-09 PROCEDURE — 6370000000 HC RX 637 (ALT 250 FOR IP)

## 2025-06-09 PROCEDURE — 1200000000 HC SEMI PRIVATE

## 2025-06-09 PROCEDURE — 2500000003 HC RX 250 WO HCPCS: Performed by: STUDENT IN AN ORGANIZED HEALTH CARE EDUCATION/TRAINING PROGRAM

## 2025-06-09 PROCEDURE — 99231 SBSQ HOSP IP/OBS SF/LOW 25: CPT

## 2025-06-09 PROCEDURE — 94760 N-INVAS EAR/PLS OXIMETRY 1: CPT

## 2025-06-09 RX ORDER — BISACODYL 10 MG
10 SUPPOSITORY, RECTAL RECTAL ONCE
Status: COMPLETED | OUTPATIENT
Start: 2025-06-09 | End: 2025-06-09

## 2025-06-09 RX ORDER — SENNA AND DOCUSATE SODIUM 50; 8.6 MG/1; MG/1
2 TABLET, FILM COATED ORAL 2 TIMES DAILY
Status: DISCONTINUED | OUTPATIENT
Start: 2025-06-09 | End: 2025-06-14 | Stop reason: HOSPADM

## 2025-06-09 RX ADMIN — LACTULOSE 20 G: 20 SOLUTION ORAL at 20:27

## 2025-06-09 RX ADMIN — INSULIN LISPRO 1 UNITS: 100 INJECTION, SOLUTION INTRAVENOUS; SUBCUTANEOUS at 17:31

## 2025-06-09 RX ADMIN — POLYETHYLENE GLYCOL 3350 17 G: 17 POWDER, FOR SOLUTION ORAL at 09:08

## 2025-06-09 RX ADMIN — CLINDAMYCIN PHOSPHATE 900 MG: 900 INJECTION, SOLUTION INTRAVENOUS at 16:07

## 2025-06-09 RX ADMIN — METOCLOPRAMIDE HYDROCHLORIDE 5 MG: 5 INJECTION INTRAMUSCULAR; INTRAVENOUS at 03:15

## 2025-06-09 RX ADMIN — MIDODRINE HYDROCHLORIDE 5 MG: 5 TABLET ORAL at 17:31

## 2025-06-09 RX ADMIN — FERROUS SULFATE TAB 325 MG (65 MG ELEMENTAL FE) 325 MG: 325 (65 FE) TAB at 09:08

## 2025-06-09 RX ADMIN — INSULIN LISPRO 1 UNITS: 100 INJECTION, SOLUTION INTRAVENOUS; SUBCUTANEOUS at 12:21

## 2025-06-09 RX ADMIN — INSULIN GLARGINE 10 UNITS: 100 INJECTION, SOLUTION SUBCUTANEOUS at 10:08

## 2025-06-09 RX ADMIN — CLINDAMYCIN PHOSPHATE 900 MG: 900 INJECTION, SOLUTION INTRAVENOUS at 23:41

## 2025-06-09 RX ADMIN — INSULIN LISPRO 7 UNITS: 100 INJECTION, SOLUTION INTRAVENOUS; SUBCUTANEOUS at 17:31

## 2025-06-09 RX ADMIN — METOCLOPRAMIDE HYDROCHLORIDE 5 MG: 5 INJECTION INTRAMUSCULAR; INTRAVENOUS at 13:05

## 2025-06-09 RX ADMIN — CALCITRIOL CAPSULES 0.25 MCG 0.25 MCG: 0.25 CAPSULE ORAL at 09:08

## 2025-06-09 RX ADMIN — INSULIN LISPRO 2 UNITS: 100 INJECTION, SOLUTION INTRAVENOUS; SUBCUTANEOUS at 20:31

## 2025-06-09 RX ADMIN — MIDODRINE HYDROCHLORIDE 5 MG: 5 TABLET ORAL at 09:08

## 2025-06-09 RX ADMIN — SODIUM CHLORIDE, PRESERVATIVE FREE 10 ML: 5 INJECTION INTRAVENOUS at 09:09

## 2025-06-09 RX ADMIN — ISOSORBIDE DINITRATE 20 MG: 10 TABLET ORAL at 09:07

## 2025-06-09 RX ADMIN — DOCUSATE SODIUM 100 MG: 100 CAPSULE, LIQUID FILLED ORAL at 09:08

## 2025-06-09 RX ADMIN — HEPARIN SODIUM 5000 UNITS: 5000 INJECTION INTRAVENOUS; SUBCUTANEOUS at 14:07

## 2025-06-09 RX ADMIN — INSULIN LISPRO 7 UNITS: 100 INJECTION, SOLUTION INTRAVENOUS; SUBCUTANEOUS at 12:22

## 2025-06-09 RX ADMIN — PREGABALIN 50 MG: 50 CAPSULE ORAL at 20:26

## 2025-06-09 RX ADMIN — METOCLOPRAMIDE HYDROCHLORIDE 5 MG: 5 INJECTION INTRAMUSCULAR; INTRAVENOUS at 20:26

## 2025-06-09 RX ADMIN — METOPROLOL SUCCINATE 25 MG: 25 TABLET, EXTENDED RELEASE ORAL at 09:08

## 2025-06-09 RX ADMIN — LACTULOSE 20 G: 20 SOLUTION ORAL at 09:08

## 2025-06-09 RX ADMIN — ISOSORBIDE DINITRATE 20 MG: 10 TABLET ORAL at 14:07

## 2025-06-09 RX ADMIN — MICONAZOLE NITRATE: 2 POWDER TOPICAL at 09:08

## 2025-06-09 RX ADMIN — METOCLOPRAMIDE HYDROCHLORIDE 5 MG: 5 INJECTION INTRAMUSCULAR; INTRAVENOUS at 09:08

## 2025-06-09 RX ADMIN — HEPARIN SODIUM 5000 UNITS: 5000 INJECTION INTRAVENOUS; SUBCUTANEOUS at 03:17

## 2025-06-09 RX ADMIN — ROSUVASTATIN 10 MG: 10 TABLET, FILM COATED ORAL at 20:26

## 2025-06-09 RX ADMIN — CLINDAMYCIN PHOSPHATE 900 MG: 900 INJECTION, SOLUTION INTRAVENOUS at 03:17

## 2025-06-09 RX ADMIN — Medication 10 MG: at 20:26

## 2025-06-09 RX ADMIN — HYDROCODONE BITARTRATE AND ACETAMINOPHEN 2 TABLET: 5; 325 TABLET ORAL at 23:43

## 2025-06-09 RX ADMIN — MIDODRINE HYDROCHLORIDE 5 MG: 5 TABLET ORAL at 12:21

## 2025-06-09 RX ADMIN — HEPARIN SODIUM 5000 UNITS: 5000 INJECTION INTRAVENOUS; SUBCUTANEOUS at 20:26

## 2025-06-09 RX ADMIN — SODIUM CHLORIDE, PRESERVATIVE FREE 10 ML: 5 INJECTION INTRAVENOUS at 20:27

## 2025-06-09 RX ADMIN — ESCITALOPRAM OXALATE 10 MG: 10 TABLET ORAL at 09:08

## 2025-06-09 RX ADMIN — HYDROCODONE BITARTRATE AND ACETAMINOPHEN 2 TABLET: 5; 325 TABLET ORAL at 17:34

## 2025-06-09 RX ADMIN — PREGABALIN 50 MG: 50 CAPSULE ORAL at 09:07

## 2025-06-09 RX ADMIN — BISACODYL 10 MG: 10 SUPPOSITORY RECTAL at 14:07

## 2025-06-09 RX ADMIN — FERROUS SULFATE TAB 325 MG (65 MG ELEMENTAL FE) 325 MG: 325 (65 FE) TAB at 20:26

## 2025-06-09 RX ADMIN — DONEPEZIL HYDROCHLORIDE 10 MG: 10 TABLET, FILM COATED ORAL at 09:08

## 2025-06-09 RX ADMIN — DAKIN'S SOLUTION 0.125% (QUARTER STRENGTH): 0.12 SOLUTION at 20:27

## 2025-06-09 RX ADMIN — DAKIN'S SOLUTION 0.125% (QUARTER STRENGTH): 0.12 SOLUTION at 09:09

## 2025-06-09 RX ADMIN — FAMOTIDINE 20 MG: 20 TABLET, FILM COATED ORAL at 09:07

## 2025-06-09 RX ADMIN — PANTOPRAZOLE SODIUM 40 MG: 40 TABLET, DELAYED RELEASE ORAL at 09:07

## 2025-06-09 RX ADMIN — MICONAZOLE NITRATE: 2 POWDER TOPICAL at 20:27

## 2025-06-09 RX ADMIN — SENNOSIDES AND DOCUSATE SODIUM 2 TABLET: 50; 8.6 TABLET ORAL at 20:26

## 2025-06-09 RX ADMIN — ISOSORBIDE DINITRATE 20 MG: 10 TABLET ORAL at 17:30

## 2025-06-09 RX ADMIN — INSULIN GLARGINE 10 UNITS: 100 INJECTION, SOLUTION SUBCUTANEOUS at 20:31

## 2025-06-09 ASSESSMENT — PAIN DESCRIPTION - LOCATION
LOCATION: FOOT
LOCATION: FOOT

## 2025-06-09 ASSESSMENT — PAIN DESCRIPTION - ORIENTATION
ORIENTATION: LEFT
ORIENTATION: LEFT

## 2025-06-09 ASSESSMENT — PAIN DESCRIPTION - DESCRIPTORS
DESCRIPTORS: BURNING
DESCRIPTORS: ACHING;DISCOMFORT

## 2025-06-09 ASSESSMENT — PAIN SCALES - GENERAL
PAINLEVEL_OUTOF10: 7
PAINLEVEL_OUTOF10: 7

## 2025-06-09 NOTE — PROGRESS NOTES
Physical Therapy     06/09/25 1400   Restrictions/Precautions   Restrictions/Precautions Fall Risk;Weight Bearing   Lower Extremity Weight Bearing Restrictions   Right Lower Extremity Weight Bearing Non Weight Bearing   General   Diagnosis SANGEETA   Subjective   Subjective pt agreeable to tx   Pain   Pre-Pain 8   Post-Pain 8   Pain Location Head   Pain Descriptor Throbbing   Pain Interventions Nurse notified;Repositioning;Rest   Bed mobility   Supine to Sit Minimal assistance   Sit to Supine Contact guard assistance   Scooting Contact guard assistance   Bed Mobility Comments pt able to scoot EOB with Ac for LLE to maintain NWB. pt unable to comply with WB status without manual assist. repositioned in bed in L side lying post sitting EOB exercises for positional changes.   PT Exercises   Exercise Treatment BLE active exercise sitting EOB x 10. quick to fatigue   Short Term Goals   Time Frame for Short Term Goals 2 wks   Short Term Goal 1 supine to sit indep   Short Term Goal 2 sit to stand indep   Short Term Goal 3 bed to chair SBA RW   Activity Tolerance   Activity Tolerance Patient limited by fatigue;Patient limited by endurance;Patient limited by pain   Assessment   Assessment pt with fatigue and pain from headache limited endurance. able to sit and scoot EOB with Ac for LLE to maintain NWB. returned to bed with alarm on and all needs in reach.   PT Plan of Care   Monday X   Safety Devices   Type of Devices Bed alarm in place;Call light within reach     Electronically signed by Per Damico PTA on 6/9/2025 at 2:57 PM

## 2025-06-09 NOTE — PROGRESS NOTES
(36.5 °C)   TempSrc: Temporal      SpO2: 100% 100%  98%   Weight:   124.1 kg (273 lb 8 oz)    Height:         General:  Appears stated age, no distress.  Orientation:  Alert and oriented to time, place, and person.  Mood and Affect:  Cooperative and pleasant.  Gait:  Resting comfortably in bed.  Cardiovascular:  Symmetric 1-2 plus pulses in upper and lower extremities.  Lymph:  No cervical or inguinal lymphadenopathy noted.  Sensation:  Grossly intact to light touch.  DTR:  Normal, no pathologic reflexes.  Coordination/balance:  Normal    Musculoskeletal:  Right upper extremity exam:  There is no tenderness to palpation about the shoulder, elbow, wrist or hand. Range of motion normal  .  5/5 strength, normal sensation, good radial pulse and skin is normal.      Left upper extremity exam:  There is no tenderness to palpation about the shoulder, elbow, wrist or hand. Range of motion normal .   5/5 strength, normal sensation, good radial pulse and skin is normal.     Right lower extremity exam:  There is no tenderness to palpation about the hip, knee, ankle or foot. Range of motion normal .  5/5 strength, good dorsalis pedis pulse. Sensation impaired 2/2 diabetic neuropathy.     Left lower extremity exam:  Left foot - WTD dakins soaked dressing in place and removed for exam. +2 palpable dorsalis pedis pulse. No boggy areas to foot when palpated. There is no redness or swelling to foot. 5/5 strength with impaired sensation 2/2 diabetic neuropathy.     DATA:    CBC with Differential:    Lab Results   Component Value Date/Time    WBC 6.8 06/09/2025 01:31 AM    RBC 4.25 06/09/2025 01:31 AM    HGB 12.2 06/09/2025 01:31 AM    HCT 38.0 06/09/2025 01:31 AM     06/09/2025 01:31 AM    MCV 89.4 06/09/2025 01:31 AM    MCH 28.7 06/09/2025 01:31 AM    MCHC 32.1 06/09/2025 01:31 AM    RDW 14.6 06/09/2025 01:31 AM    LYMPHOPCT 17.1 06/09/2025 01:31 AM    MONOPCT 15.6 06/09/2025 01:31 AM    EOSPCT 4.9 06/09/2025 01:31 AM     acute surrounding soft tissue abnormalitites. Lung apices are clear.      No acute findings in the cervical spine.  All CT scans are performed using dose optimization techniques as appropriate to the performed exam and include at least one of the following: Automated exposure control, adjustment of the mA and/or kV according to size, and the use of iterative reconstruction technique.  ______________________________________ Electronically signed by: MICHAEL SMITH M.D. Date:     06/02/2025 Time:    05:19       Assessment:     Chronic diabetic ulcer of left foot      Plan:  Left foot CT reviewed and there appears to be no definite findings of acute abscess or osteomyelitis. These concerns may be better evaluated with MRI. However, due to the known chronicity of this patient's ulcerations, we continue to recommend that further treatment be determined outpatient with patient's primary podiatrist. Additionally, in the presence of normal WBC and patient remaining afebrile, there is no concern for acute issues requiring intervention while inpatient. We recommend discharging on antibiotics per primary and for patient to schedule a hospital follow up with Dr. Heaton within a week a discharge.       Provider: CORI Hall - CHITRA  Date: 6/9/2025

## 2025-06-09 NOTE — PROGRESS NOTES
This  visited with pt to follow up and provide spiritual care. Pt says he is better. He says he doesn't have specific spiritual needs but welcomed the visit and prayer. Pt says he is Yarsani and his ana maría is important to him. This  provided spiritual care with sustaining presence, support, and prayer. Pt expressed gratitude for spiritual care.       Spiritual Health History and Assessment/Progress Note  [facility]    Spiritual/Emotional Needs,  ,  ,      Name: Cholo Bhakta MRN: [id number]    Age: 69 y.o.     Sex: male   Language: English   Pentecostal: Yarsani   SANGEETA (acute kidney injury)     Date: 6/9/2025            Total Time Calculated: 10 min              Spiritual Assessment continued in [facility] 3 KENNETH/VAS/MED        Referral/Consult From: Palliative Care   Encounter Overview/Reason: Spiritual/Emotional Needs  Service Provided For: Patient    Ana María, Belief, Meaning:   Patient identifies as spiritual and is connected with a ana maría tradition or spiritual practice  Family/Friends No family/friends present      Importance and Influence:  Patient has spiritual/personal beliefs that influence decisions regarding their health  Family/Friends No family/friends present    Community:  Patient is connected with a spiritual community  Family/Friends No family/friends present    Assessment and Plan of Care:     Patient Interventions include: Provided sacramental/Anabaptism ritual  Family/Friends Interventions include: No family/friends present    Patient Plan of Care: Spiritual Care available upon further referral  Family/Friends Plan of Care: No family/friends present    Electronically signed by Mary Behrens, Chaplain on 6/9/2025 at 10:42 AM

## 2025-06-09 NOTE — PROGRESS NOTES
[facility]    Progress Note    Patient:  Cholo Bhakta  YOB: 1955  Date of Service: 6/9/2025  MRN: [id number]   Acct: [id number]   Primary Care Physician: Nirmal Nuno MD  Advance Directive: Full Code  Admit Date: 6/2/2025       Hospital Day: 7    Portions of this note have been copied forward, however, updated to reflect the most current clinical status of this patient.     CHIEF COMPLAINT: Fall    SUBJECTIVE: Doing ok this morning, no complaints overnight, pain under control    CUMULATIVE HOSPITAL COURSE:     This patient is a 69-year-old male with a past medical history of hypertension, hyperlipidemia, type 2 diabetes mellitus, CKD, chronic atrial fibrillation on anticoagulation, chronic systolic heart failure, CAD s/p CABG who presented to [facility] ED on 6/2/2025 after sustaining a fall.  He also had sacral pressure wound and left diabetic ulcer noted that he is seeing podiatry for.  ED workup revealed--CT head with no acute intracranial abnormality, CT cervical/lumbar spine with no acute osseous abnormality, CT pelvis with no acute findings, chest x-ray with no acute findings.  Creatinine 5.3, .  Patient was admitted to hospitalist service with consultation to nephrology and podiatry.  Patient was agreeable to dialysis.  He underwent permacath placement on 6/4.  He has been tolerating dialysis well.  Podiatry evaluated patient and felt there were no signs of acute infection, plan to follow-up with Dr. Heaton outpatient.  Podiatry was reconsulted after removing wound VAC with noted maceration to VISHNU wound skin moderate odor to right plantar foot wound.  CT foot revealing cellulitis, without definite drainable abscess, chronic appearing erosions without definite acute osteomyelitis.  Patient does have known chronic ulcerations.  Podiatry did reevaluate today and recommended further treatment to be determined outpatient with patient's primary podiatrist.  Patient has had no

## 2025-06-09 NOTE — PLAN OF CARE
Problem: Nutrition Deficit:  Goal: Optimize nutritional status  Outcome: Not Progressing     Problem: Safety - Adult  Goal: Free from fall injury  Outcome: Progressing     Problem: Skin/Tissue Integrity  Goal: Skin integrity remains intact  Description: 1.  Monitor for areas of redness and/or skin breakdown2.  Assess vascular access sites hourly3.  Every 4-6 hours minimum:  Change oxygen saturation probe site4.  Every 4-6 hours:  If on nasal continuous positive airway pressure, respiratory therapy assess nares and determine need for appliance change or resting period  Outcome: Progressing     Problem: Pain  Goal: Verbalizes/displays adequate comfort level or baseline comfort level  Outcome: Progressing     Problem: Respiratory - Adult  Goal: Achieves optimal ventilation and oxygenation  Outcome: Progressing     Problem: Skin/Tissue Integrity - Adult  Goal: Skin integrity remains intact  Description: 1.  Monitor for areas of redness and/or skin breakdown2.  Assess vascular access sites hourly3.  Every 4-6 hours minimum:  Change oxygen saturation probe site4.  Every 4-6 hours:  If on nasal continuous positive airway pressure, respiratory therapy assess nares and determine need for appliance change or resting period  Outcome: Progressing     Problem: Nutrition Deficit:  Goal: Optimize nutritional status  Outcome: Not Progressing

## 2025-06-09 NOTE — PLAN OF CARE
Problem: Nutrition Deficit:  Goal: Optimize nutritional status  6/9/2025 1005 by Catarina Glover RN  Outcome: Progressing  6/9/2025 0120 by Franklin Covington RN  Outcome: Not Progressing     Problem: Chronic Conditions and Co-morbidities  Goal: Patient's chronic conditions and co-morbidity symptoms are monitored and maintained or improved  Outcome: Progressing     Problem: Discharge Planning  Goal: Discharge to home or other facility with appropriate resources  Outcome: Progressing     Problem: Safety - Adult  Goal: Free from fall injury  6/9/2025 1005 by Catarina Glover RN  Outcome: Progressing  6/9/2025 0120 by Franklin Covington RN  Outcome: Progressing     Problem: Skin/Tissue Integrity  Goal: Skin integrity remains intact  Description: 1.  Monitor for areas of redness and/or skin breakdown2.  Assess vascular access sites hourly3.  Every 4-6 hours minimum:  Change oxygen saturation probe site4.  Every 4-6 hours:  If on nasal continuous positive airway pressure, respiratory therapy assess nares and determine need for appliance change or resting period  6/9/2025 1005 by Catarina Glover RN  Outcome: Progressing  6/9/2025 0120 by Franklin Covington RN  Outcome: Progressing     Problem: Pain  Goal: Verbalizes/displays adequate comfort level or baseline comfort level  6/9/2025 1005 by Catarina Glover RN  Outcome: Progressing  6/9/2025 0120 by Franklin Covington RN  Outcome: Progressing     Problem: Neurosensory - Adult  Goal: Achieves stable or improved neurological status  Outcome: Progressing     Problem: Respiratory - Adult  Goal: Achieves optimal ventilation and oxygenation  6/9/2025 1005 by Catarina Glover RN  Outcome: Progressing  6/9/2025 0120 by Franklin Covington RN  Outcome: Progressing     Problem: Cardiovascular - Adult  Goal: Maintains optimal cardiac output and hemodynamic stability  Outcome: Progressing  Goal: Absence of cardiac dysrhythmias or at baseline  Outcome: Progressing     Problem:

## 2025-06-09 NOTE — PROGRESS NOTES
Nephrology ([facility]) Progress Note    Patient:  Cholo Bhakta  YOB: 1955  Date of Service: 6/9/2025  MRN: [id number]   Acct: [id number]   Primary Care Physician: Nirmal Nuno MD  Advance Directive: Full Code  Admit Date: 6/2/2025       Hospital Day: 7  Referring Provider: Adrián Benz MD    Patient independently seen and examined, Chart, Consults, Notes, Operative notes, Labs, Cardiology, and Radiology studies reviewed as available.    Subjective:  Cholo Bhakta is a 69 y.o. male for whom we were consulted for evaluation and treatment of chronic kidney disease progressing to end-stage renal disease.  Patient has multiple medical history including morbid obesity, CAD, CABG, PVD, CVA, CHF, diabetic foot ulcer and hypertension.  He has been followed for multiple admissions in the office for many years.  He had previously declined dialysis initiation but was agreeable after meeting with Dr. Freed last week.    Today, no overnight events.  Denied current chest pain, shortness of air at rest, nausea or vomiting.  Awaiting outpatient dialysis set up.    Allergies:  Cefepime, Bactrim [sulfamethoxazole-trimethoprim], and Metronidazole    Medicines:  Current Facility-Administered Medications   Medication Dose Route Frequency Provider Last Rate Last Admin    miconazole (MICOTIN) 2 % powder   Topical BID Adrián Benz MD   Given at 06/09/25 0908    lactulose (CHRONULAC) 10 GM/15ML solution 20 g  20 g Oral TID Ramu Esposito APRN - CNP   20 g at 06/09/25 0908    metoclopramide (REGLAN) injection 5 mg  5 mg IntraVENous Q6H Ramu Esposito APRN - CNP   5 mg at 06/09/25 0908    polyethylene glycol (GLYCOLAX) packet 17 g  17 g Oral Daily Ramu Esposito APRN - CNP   17 g at 06/09/25 0908    midodrine (PROAMATINE) tablet 5 mg  5 mg Oral TID  Shailesh Freed MD   5 mg at 06/09/25 0908    metoprolol succinate (TOPROL XL) extended release tablet 25 mg  25 mg Oral Daily Shailesh Freed MD    donepezil (ARICEPT) tablet 10 mg  10 mg Oral Daily Aj yLons MD   10 mg at 06/09/25 0908    vitamin D (ERGOCALCIFEROL) capsule 50,000 Units  50,000 Units Oral Weekly Aj Lyons MD   50,000 Units at 06/03/25 1249    ferrous sulfate (IRON 325) tablet 325 mg  325 mg Oral BID Aj Lyons MD   325 mg at 06/09/25 0908    pantoprazole (PROTONIX) tablet 40 mg  40 mg Oral Daily Aj Lyons MD   40 mg at 06/09/25 0907    rosuvastatin (CRESTOR) tablet 10 mg  10 mg Oral Nightly Aj Lyons MD   10 mg at 06/08/25 2123    pregabalin (LYRICA) capsule 50 mg  50 mg Oral BID Aj Lyons MD   50 mg at 06/09/25 0907    insulin lispro (HUMALOG,ADMELOG) injection vial 7 Units  7 Units SubCUTAneous TID WC Aj Lyons MD   7 Units at 06/06/25 1142    sodium chloride flush 0.9 % injection 10 mL  10 mL IntraVENous PRN Aj Lyons MD        0.9 % sodium chloride infusion   IntraVENous PRN Aj Lyons MD        ondansetron (ZOFRAN-ODT) disintegrating tablet 4 mg  4 mg Oral Q8H PRN Aj Lyons MD        Or    ondansetron (ZOFRAN) injection 4 mg  4 mg IntraVENous Q6H PRN Aj Lyons MD   4 mg at 06/08/25 1621    magnesium hydroxide (MILK OF MAGNESIA) 400 MG/5ML suspension 30 mL  30 mL Oral Daily PRN Aj Lyons MD        acetaminophen (TYLENOL) tablet 650 mg  650 mg Oral Q6H PRN Aj Lyons MD   650 mg at 06/02/25 0826    Or    acetaminophen (TYLENOL) suppository 650 mg  650 mg Rectal Q6H PRN Aj Lyons MD        heparin (porcine) injection 5,000 Units  5,000 Units SubCUTAneous 3 times per day Aj Lyons MD   5,000 Units at 06/09/25 0317    glucose chewable tablet 16 g  4 tablet Oral PRN Aj Lyons MD        dextrose bolus 10% 125 mL  125 mL IntraVENous PRN Aj Lyons MD        Or    dextrose bolus 10% 250 mL  250 mL IntraVENous PRAj Carlos MD        glucagon injection 1 mg  1 mg SubCUTAneous PRAj Carlos MD

## 2025-06-09 NOTE — CARE COORDINATION
Called [facility] again to check on status on referral. Will acb  Electronically signed by Evie Carroll RN on 6/9/2025 at 2:31 PM

## 2025-06-10 ENCOUNTER — APPOINTMENT (OUTPATIENT)
Dept: CT IMAGING | Age: 70
End: 2025-06-10
Payer: MEDICARE

## 2025-06-10 LAB
ALBUMIN SERPL-MCNC: 3.6 G/DL (ref 3.5–5.2)
ALBUMIN SERPL-MCNC: 3.9 G/DL (ref 3.5–5.2)
ALP SERPL-CCNC: 130 U/L (ref 40–129)
ALP SERPL-CCNC: 141 U/L (ref 40–129)
ALT SERPL-CCNC: 11 U/L (ref 10–50)
ALT SERPL-CCNC: 8 U/L (ref 10–50)
ANION GAP SERPL CALCULATED.3IONS-SCNC: 16 MMOL/L (ref 8–16)
ANION GAP SERPL CALCULATED.3IONS-SCNC: 18 MMOL/L (ref 8–16)
AST SERPL-CCNC: 28 U/L (ref 10–50)
AST SERPL-CCNC: 34 U/L (ref 10–50)
BASOPHILS # BLD: 0.1 K/UL (ref 0–0.2)
BASOPHILS # BLD: 0.1 K/UL (ref 0–0.2)
BASOPHILS NFR BLD: 0.7 % (ref 0–1)
BASOPHILS NFR BLD: 0.8 % (ref 0–1)
BILIRUB SERPL-MCNC: 0.6 MG/DL (ref 0.2–1.2)
BILIRUB SERPL-MCNC: 0.6 MG/DL (ref 0.2–1.2)
BUN SERPL-MCNC: 28 MG/DL (ref 8–23)
BUN SERPL-MCNC: 59 MG/DL (ref 8–23)
CALCIUM SERPL-MCNC: 9.1 MG/DL (ref 8.8–10.2)
CALCIUM SERPL-MCNC: 9.2 MG/DL (ref 8.8–10.2)
CHLORIDE SERPL-SCNC: 94 MMOL/L (ref 98–107)
CHLORIDE SERPL-SCNC: 96 MMOL/L (ref 98–107)
CO2 SERPL-SCNC: 25 MMOL/L (ref 22–29)
CO2 SERPL-SCNC: 25 MMOL/L (ref 22–29)
CREAT SERPL-MCNC: 4.2 MG/DL (ref 0.7–1.2)
CREAT SERPL-MCNC: 6.5 MG/DL (ref 0.7–1.2)
EKG P AXIS: NORMAL DEGREES
EKG P-R INTERVAL: NORMAL MS
EKG Q-T INTERVAL: 388 MS
EKG QRS DURATION: 132 MS
EKG QTC CALCULATION (BAZETT): 445 MS
EKG T AXIS: 81 DEGREES
EOSINOPHIL # BLD: 0.3 K/UL (ref 0–0.6)
EOSINOPHIL # BLD: 0.4 K/UL (ref 0–0.6)
EOSINOPHIL NFR BLD: 2.6 % (ref 0–5)
EOSINOPHIL NFR BLD: 4.9 % (ref 0–5)
ERYTHROCYTE [DISTWIDTH] IN BLOOD BY AUTOMATED COUNT: 14.6 % (ref 11.5–14.5)
ERYTHROCYTE [DISTWIDTH] IN BLOOD BY AUTOMATED COUNT: 14.6 % (ref 11.5–14.5)
GLUCOSE BLD-MCNC: 127 MG/DL (ref 70–99)
GLUCOSE BLD-MCNC: 129 MG/DL (ref 70–99)
GLUCOSE BLD-MCNC: 141 MG/DL (ref 70–99)
GLUCOSE BLD-MCNC: 180 MG/DL (ref 70–99)
GLUCOSE BLD-MCNC: 200 MG/DL (ref 70–99)
GLUCOSE BLD-MCNC: 201 MG/DL (ref 70–99)
GLUCOSE BLD-MCNC: 212 MG/DL (ref 70–99)
GLUCOSE SERPL-MCNC: 118 MG/DL (ref 70–99)
GLUCOSE SERPL-MCNC: 214 MG/DL (ref 70–99)
HCT VFR BLD AUTO: 33.4 % (ref 42–52)
HCT VFR BLD AUTO: 36.3 % (ref 42–52)
HGB BLD-MCNC: 11.3 G/DL (ref 14–18)
HGB BLD-MCNC: 12.2 G/DL (ref 14–18)
IMM GRANULOCYTES # BLD: 0.1 K/UL
IMM GRANULOCYTES # BLD: 0.1 K/UL
LACTATE BLDV-SCNC: 2 MG/DL (ref 0.5–1.9)
LYMPHOCYTES # BLD: 1.3 K/UL (ref 1.1–4.5)
LYMPHOCYTES # BLD: 1.4 K/UL (ref 1.1–4.5)
LYMPHOCYTES NFR BLD: 12.5 % (ref 20–40)
LYMPHOCYTES NFR BLD: 16 % (ref 20–40)
MCH RBC QN AUTO: 29.1 PG (ref 27–31)
MCH RBC QN AUTO: 29.5 PG (ref 27–31)
MCHC RBC AUTO-ENTMCNC: 33.6 G/DL (ref 33–37)
MCHC RBC AUTO-ENTMCNC: 33.8 G/DL (ref 33–37)
MCV RBC AUTO: 86.6 FL (ref 80–94)
MCV RBC AUTO: 87.2 FL (ref 80–94)
MONOCYTES # BLD: 1.1 K/UL (ref 0–0.9)
MONOCYTES # BLD: 1.2 K/UL (ref 0–0.9)
MONOCYTES NFR BLD: 10.8 % (ref 0–10)
MONOCYTES NFR BLD: 13.2 % (ref 0–10)
NEUTROPHILS # BLD: 5.1 K/UL (ref 1.5–7.5)
NEUTROPHILS # BLD: 8.3 K/UL (ref 1.5–7.5)
NEUTS SEG NFR BLD: 64.2 % (ref 50–65)
NEUTS SEG NFR BLD: 72.8 % (ref 50–65)
PERFORMED ON: ABNORMAL
PLATELET # BLD AUTO: 179 K/UL (ref 130–400)
PLATELET # BLD AUTO: 181 K/UL (ref 130–400)
PMV BLD AUTO: 11.6 FL (ref 9.4–12.4)
PMV BLD AUTO: 11.8 FL (ref 9.4–12.4)
POTASSIUM SERPL-SCNC: 3.6 MMOL/L (ref 3.5–5)
POTASSIUM SERPL-SCNC: 3.8 MMOL/L (ref 3.5–5)
PROLACTIN SERPL-MCNC: 65.7 NG/ML (ref 4.04–15.2)
PROT SERPL-MCNC: 7 G/DL (ref 6.4–8.3)
PROT SERPL-MCNC: 7.4 G/DL (ref 6.4–8.3)
RBC # BLD AUTO: 3.83 M/UL (ref 4.7–6.1)
RBC # BLD AUTO: 4.19 M/UL (ref 4.7–6.1)
SODIUM SERPL-SCNC: 137 MMOL/L (ref 136–145)
SODIUM SERPL-SCNC: 137 MMOL/L (ref 136–145)
TROPONIN, HIGH SENSITIVITY: 152 NG/L (ref 0–22)
WBC # BLD AUTO: 11.3 K/UL (ref 4.8–10.8)
WBC # BLD AUTO: 8 K/UL (ref 4.8–10.8)

## 2025-06-10 PROCEDURE — 94760 N-INVAS EAR/PLS OXIMETRY 1: CPT

## 2025-06-10 PROCEDURE — 72125 CT NECK SPINE W/O DYE: CPT

## 2025-06-10 PROCEDURE — 6370000000 HC RX 637 (ALT 250 FOR IP): Performed by: SURGERY

## 2025-06-10 PROCEDURE — 6370000000 HC RX 637 (ALT 250 FOR IP): Performed by: STUDENT IN AN ORGANIZED HEALTH CARE EDUCATION/TRAINING PROGRAM

## 2025-06-10 PROCEDURE — 85025 COMPLETE CBC W/AUTO DIFF WBC: CPT

## 2025-06-10 PROCEDURE — 6370000000 HC RX 637 (ALT 250 FOR IP): Performed by: INTERNAL MEDICINE

## 2025-06-10 PROCEDURE — 2500000003 HC RX 250 WO HCPCS: Performed by: ANESTHESIOLOGY

## 2025-06-10 PROCEDURE — 36415 COLL VENOUS BLD VENIPUNCTURE: CPT

## 2025-06-10 PROCEDURE — 83605 ASSAY OF LACTIC ACID: CPT

## 2025-06-10 PROCEDURE — 6370000000 HC RX 637 (ALT 250 FOR IP)

## 2025-06-10 PROCEDURE — 6360000002 HC RX W HCPCS

## 2025-06-10 PROCEDURE — 84146 ASSAY OF PROLACTIN: CPT

## 2025-06-10 PROCEDURE — 51798 US URINE CAPACITY MEASURE: CPT

## 2025-06-10 PROCEDURE — 84484 ASSAY OF TROPONIN QUANT: CPT

## 2025-06-10 PROCEDURE — 8010000000 HC HEMODIALYSIS ACUTE INPT

## 2025-06-10 PROCEDURE — 6360000002 HC RX W HCPCS: Performed by: SURGERY

## 2025-06-10 PROCEDURE — 1200000000 HC SEMI PRIVATE

## 2025-06-10 PROCEDURE — 80053 COMPREHEN METABOLIC PANEL: CPT

## 2025-06-10 PROCEDURE — 82962 GLUCOSE BLOOD TEST: CPT

## 2025-06-10 PROCEDURE — 70450 CT HEAD/BRAIN W/O DYE: CPT

## 2025-06-10 RX ORDER — BUTALBITAL, ACETAMINOPHEN AND CAFFEINE 50; 325; 40 MG/1; MG/1; MG/1
1 TABLET ORAL ONCE
Status: COMPLETED | OUTPATIENT
Start: 2025-06-10 | End: 2025-06-10

## 2025-06-10 RX ORDER — BISACODYL 10 MG
10 SUPPOSITORY, RECTAL RECTAL ONCE
Status: DISCONTINUED | OUTPATIENT
Start: 2025-06-10 | End: 2025-06-14 | Stop reason: HOSPADM

## 2025-06-10 RX ADMIN — MAGNESIUM HYDROXIDE 15 ML: 400 SUSPENSION ORAL at 13:24

## 2025-06-10 RX ADMIN — SENNOSIDES AND DOCUSATE SODIUM 2 TABLET: 50; 8.6 TABLET ORAL at 13:24

## 2025-06-10 RX ADMIN — PANTOPRAZOLE SODIUM 40 MG: 40 TABLET, DELAYED RELEASE ORAL at 13:00

## 2025-06-10 RX ADMIN — HEPARIN SODIUM 5000 UNITS: 5000 INJECTION INTRAVENOUS; SUBCUTANEOUS at 22:26

## 2025-06-10 RX ADMIN — SENNOSIDES AND DOCUSATE SODIUM 2 TABLET: 50; 8.6 TABLET ORAL at 20:10

## 2025-06-10 RX ADMIN — SODIUM CHLORIDE, PRESERVATIVE FREE 10 ML: 5 INJECTION INTRAVENOUS at 21:59

## 2025-06-10 RX ADMIN — MICONAZOLE NITRATE: 2 POWDER TOPICAL at 13:13

## 2025-06-10 RX ADMIN — ROSUVASTATIN 10 MG: 10 TABLET, FILM COATED ORAL at 20:11

## 2025-06-10 RX ADMIN — DONEPEZIL HYDROCHLORIDE 10 MG: 10 TABLET, FILM COATED ORAL at 12:59

## 2025-06-10 RX ADMIN — FAMOTIDINE 20 MG: 20 TABLET, FILM COATED ORAL at 12:59

## 2025-06-10 RX ADMIN — METOPROLOL SUCCINATE 25 MG: 25 TABLET, EXTENDED RELEASE ORAL at 12:59

## 2025-06-10 RX ADMIN — HEPARIN SODIUM 5000 UNITS: 5000 INJECTION INTRAVENOUS; SUBCUTANEOUS at 05:06

## 2025-06-10 RX ADMIN — ACETAMINOPHEN 650 MG: 325 TABLET ORAL at 23:07

## 2025-06-10 RX ADMIN — LACTULOSE 20 G: 20 SOLUTION ORAL at 13:07

## 2025-06-10 RX ADMIN — MIDODRINE HYDROCHLORIDE 5 MG: 5 TABLET ORAL at 12:59

## 2025-06-10 RX ADMIN — ISOSORBIDE DINITRATE 20 MG: 10 TABLET ORAL at 17:04

## 2025-06-10 RX ADMIN — LACTULOSE 20 G: 20 SOLUTION ORAL at 20:11

## 2025-06-10 RX ADMIN — METOCLOPRAMIDE HYDROCHLORIDE 5 MG: 5 INJECTION INTRAMUSCULAR; INTRAVENOUS at 20:11

## 2025-06-10 RX ADMIN — METOCLOPRAMIDE HYDROCHLORIDE 5 MG: 5 INJECTION INTRAMUSCULAR; INTRAVENOUS at 14:47

## 2025-06-10 RX ADMIN — INSULIN GLARGINE 10 UNITS: 100 INJECTION, SOLUTION SUBCUTANEOUS at 13:24

## 2025-06-10 RX ADMIN — FERROUS SULFATE TAB 325 MG (65 MG ELEMENTAL FE) 325 MG: 325 (65 FE) TAB at 20:11

## 2025-06-10 RX ADMIN — FERROUS SULFATE TAB 325 MG (65 MG ELEMENTAL FE) 325 MG: 325 (65 FE) TAB at 12:59

## 2025-06-10 RX ADMIN — PREGABALIN 50 MG: 50 CAPSULE ORAL at 12:59

## 2025-06-10 RX ADMIN — HYDROCODONE BITARTRATE AND ACETAMINOPHEN 2 TABLET: 5; 325 TABLET ORAL at 17:04

## 2025-06-10 RX ADMIN — Medication 10 MG: at 20:11

## 2025-06-10 RX ADMIN — MICONAZOLE NITRATE: 2 POWDER TOPICAL at 22:26

## 2025-06-10 RX ADMIN — DAKIN'S SOLUTION 0.125% (QUARTER STRENGTH): 0.12 SOLUTION at 13:13

## 2025-06-10 RX ADMIN — BUTALBITAL, ACETAMINOPHEN, AND CAFFEINE 1 TABLET: 325; 50; 40 TABLET ORAL at 18:11

## 2025-06-10 RX ADMIN — LEVOFLOXACIN 500 MG: 500 INJECTION, SOLUTION INTRAVENOUS at 14:52

## 2025-06-10 RX ADMIN — CLINDAMYCIN PHOSPHATE 900 MG: 900 INJECTION, SOLUTION INTRAVENOUS at 23:14

## 2025-06-10 RX ADMIN — CALCITRIOL CAPSULES 0.25 MCG 0.25 MCG: 0.25 CAPSULE ORAL at 12:59

## 2025-06-10 RX ADMIN — INSULIN GLARGINE 10 UNITS: 100 INJECTION, SOLUTION SUBCUTANEOUS at 20:11

## 2025-06-10 RX ADMIN — SODIUM CHLORIDE, PRESERVATIVE FREE 10 ML: 5 INJECTION INTRAVENOUS at 13:14

## 2025-06-10 RX ADMIN — HYDROCODONE BITARTRATE AND ACETAMINOPHEN 2 TABLET: 5; 325 TABLET ORAL at 13:00

## 2025-06-10 RX ADMIN — ONDANSETRON 4 MG: 2 INJECTION INTRAMUSCULAR; INTRAVENOUS at 15:42

## 2025-06-10 RX ADMIN — INSULIN LISPRO 7 UNITS: 100 INJECTION, SOLUTION INTRAVENOUS; SUBCUTANEOUS at 17:04

## 2025-06-10 RX ADMIN — INSULIN LISPRO 1 UNITS: 100 INJECTION, SOLUTION INTRAVENOUS; SUBCUTANEOUS at 17:06

## 2025-06-10 RX ADMIN — ESCITALOPRAM OXALATE 10 MG: 10 TABLET ORAL at 12:59

## 2025-06-10 RX ADMIN — HEPARIN SODIUM 5000 UNITS: 5000 INJECTION INTRAVENOUS; SUBCUTANEOUS at 14:45

## 2025-06-10 RX ADMIN — POLYETHYLENE GLYCOL 3350 17 G: 17 POWDER, FOR SOLUTION ORAL at 12:59

## 2025-06-10 RX ADMIN — CLINDAMYCIN PHOSPHATE 900 MG: 900 INJECTION, SOLUTION INTRAVENOUS at 05:10

## 2025-06-10 RX ADMIN — CLINDAMYCIN PHOSPHATE 900 MG: 900 INJECTION, SOLUTION INTRAVENOUS at 16:11

## 2025-06-10 RX ADMIN — METOCLOPRAMIDE HYDROCHLORIDE 5 MG: 5 INJECTION INTRAMUSCULAR; INTRAVENOUS at 02:20

## 2025-06-10 RX ADMIN — ERGOCALCIFEROL 50000 UNITS: 1.25 CAPSULE ORAL at 12:59

## 2025-06-10 RX ADMIN — ISOSORBIDE DINITRATE 20 MG: 10 TABLET ORAL at 12:59

## 2025-06-10 RX ADMIN — MIDODRINE HYDROCHLORIDE 5 MG: 5 TABLET ORAL at 17:04

## 2025-06-10 RX ADMIN — PREGABALIN 50 MG: 50 CAPSULE ORAL at 20:11

## 2025-06-10 RX ADMIN — DAKIN'S SOLUTION 0.125% (QUARTER STRENGTH): 0.12 SOLUTION at 22:27

## 2025-06-10 ASSESSMENT — PAIN DESCRIPTION - LOCATION
LOCATION: FOOT;HEAD
LOCATION: HEAD
LOCATION: FOOT

## 2025-06-10 ASSESSMENT — PAIN - FUNCTIONAL ASSESSMENT
PAIN_FUNCTIONAL_ASSESSMENT: PREVENTS OR INTERFERES SOME ACTIVE ACTIVITIES AND ADLS

## 2025-06-10 ASSESSMENT — PAIN DESCRIPTION - ORIENTATION
ORIENTATION: LEFT
ORIENTATION: MID
ORIENTATION: LEFT;ANTERIOR

## 2025-06-10 ASSESSMENT — PAIN SCALES - GENERAL
PAINLEVEL_OUTOF10: 9
PAINLEVEL_OUTOF10: 9
PAINLEVEL_OUTOF10: 7
PAINLEVEL_OUTOF10: 1
PAINLEVEL_OUTOF10: 1
PAINLEVEL_OUTOF10: 4

## 2025-06-10 ASSESSMENT — PAIN DESCRIPTION - DESCRIPTORS
DESCRIPTORS: ACHING;POUNDING;THROBBING
DESCRIPTORS: ACHING;DISCOMFORT
DESCRIPTORS: ACHING;DISCOMFORT

## 2025-06-10 NOTE — SIGNIFICANT EVENT
RRT called to this room.  Patient was sitting on the side of the bed complaining of a headache.  Became diaphoretic, nauseated, vomited, lightheaded.  Then had a witnessed \"seizure\" that lasted approximately 1 minute.  Nursing staff reported entire body jerking and eyes rolled to back of head.  No urinary incontinence, however patient is on dialysis.  He reports he has headaches often, but this is the worst headache he's had.  Also complaining of neck pain.  Vitals stable.  STAT CT head, cervical spine ordered.  STAT EKG.  STAT CBC, CMP, prolactin, lactic, troponin ordered.  Neurology consult placed.

## 2025-06-10 NOTE — PROGRESS NOTES
Nutrition Assessment     Type and Reason for Visit: Reassess    Nutrition Recommendations/Plan:   Modify current diet order with addition of Carb Control.    Follow for advancing diet, po intake, labs     Malnutrition Assessment:  Malnutrition Status: At risk for malnutrition    Nutrition Assessment:  Patient in dialysis at time of visit.  Breakfast tray still on bedside table.  Weight has decreased slightly since last week.  Diet now full liquid.  Modified with Carb Control.  Wound vac is off at this time.  Dialysis continues.    Estimated Daily Nutrient Needs:  Energy (kcal):  4424-6671 kcals (15-8 kcals/kg) Weight Used for Energy Requirements: Current     Protein (g):  89-149g Weight Used for Protein Requirements: Ideal        Fluid (ml/day):  6830-0749 ml Method Used for Fluid Requirements: Standard renal    Nutrition Related Findings:   +1nonpitting BLE., +1 BUE edema.           NO BM--now receiving Glycolax (started 6/7/25), Lactulose (started 6/8/2025),  Senokot (started 6/9/2025) Wound Type: Stage II, Multiple, Diabetic Ulcer    Current Nutrition Therapies:    ADULT DIET; Full Liquid; 4 carb choices (60 gm/meal)    Anthropometric Measures:  Height: 182.9 cm (6' 0.01\")  Current Body Wt: 124.1 kg (273 lb 9.5 oz)   BMI: 37.1        Nutrition Diagnosis:   Inadequate protein-energy intake, Altered nutrition-related lab values related to acute injury/trauma, increase demand for energy/nutrients, endocrine dysfunction as evidenced by intake 0-25%, poor intake prior to admission, wounds, dialysis, lab values    Nutrition Interventions:   Food and/or Nutrient Delivery: Modify Current Diet  Nutrition Education/Counseling: No recommendation at this time  Coordination of Nutrition Care: Continue to monitor while inpatient       Goals:  Goals: Meet at least 75% of estimated needs, PO intake 50% or greater, Maintain adequate nutrition status  Type of Goal: Continue current goal  Previous Goal Met: Progressing toward  Goal(s)    Nutrition Monitoring and Evaluation:   Behavioral-Environmental Outcomes: None Identified  Food/Nutrient Intake Outcomes: Food and Nutrient Intake, Supplement Intake  Physical Signs/Symptoms Outcomes: Biochemical Data, Chewing or Swallowing, Fluid Status or Edema, Weight, Skin    Discharge Planning:    Too soon to determine     Deidre Azevedo MS, RD, LD  Contact: [phone]

## 2025-06-10 NOTE — PROGRESS NOTES
Occupational Therapy  Attempted to work with patient this pm. Pt agreeable and was able to sit EOB. Pt began to get sick once sitting EOB. Nursing made aware. Pt's HR increased sitting EOB. Will continue to follow as able. Electronically signed by DARCY Almeida on 6/10/2025 at 4:39 PM

## 2025-06-10 NOTE — PROGRESS NOTES
Pt c/o severe headache, nausea, and was diaphoretic. He sat up to the side of the bed and began to have a seizure, witnessed by the PCA Mikala, and myself. We called a rapid. Orders were placed. Will continue to monitor the patient.

## 2025-06-10 NOTE — PLAN OF CARE
Problem: Nutrition Deficit:  Goal: Optimize nutritional status  Outcome: Progressing     Problem: Chronic Conditions and Co-morbidities  Goal: Patient's chronic conditions and co-morbidity symptoms are monitored and maintained or improved  Outcome: Progressing     Problem: Discharge Planning  Goal: Discharge to home or other facility with appropriate resources  Outcome: Progressing     Problem: Safety - Adult  Goal: Free from fall injury  Outcome: Progressing     Problem: Skin/Tissue Integrity  Goal: Skin integrity remains intact  Description: 1.  Monitor for areas of redness and/or skin breakdown2.  Assess vascular access sites hourly3.  Every 4-6 hours minimum:  Change oxygen saturation probe site4.  Every 4-6 hours:  If on nasal continuous positive airway pressure, respiratory therapy assess nares and determine need for appliance change or resting period  Outcome: Progressing     Problem: Pain  Goal: Verbalizes/displays adequate comfort level or baseline comfort level  Outcome: Progressing     Problem: Neurosensory - Adult  Goal: Achieves stable or improved neurological status  Outcome: Progressing     Problem: Respiratory - Adult  Goal: Achieves optimal ventilation and oxygenation  Outcome: Progressing     Problem: Cardiovascular - Adult  Goal: Maintains optimal cardiac output and hemodynamic stability  Outcome: Progressing  Goal: Absence of cardiac dysrhythmias or at baseline  Outcome: Progressing     Problem: Skin/Tissue Integrity - Adult  Goal: Skin integrity remains intact  Description: 1.  Monitor for areas of redness and/or skin breakdown2.  Assess vascular access sites hourly3.  Every 4-6 hours minimum:  Change oxygen saturation probe site4.  Every 4-6 hours:  If on nasal continuous positive airway pressure, respiratory therapy assess nares and determine need for appliance change or resting period  Outcome: Progressing  Goal: Incisions, wounds, or drain sites healing without S/S of infection  Outcome:  Progressing  Goal: Oral mucous membranes remain intact  Outcome: Progressing     Problem: Musculoskeletal - Adult  Goal: Return mobility to safest level of function  Outcome: Progressing  Goal: Maintain proper alignment of affected body part  Outcome: Progressing  Goal: Return ADL status to a safe level of function  Outcome: Progressing     Problem: Gastrointestinal - Adult  Goal: Minimal or absence of nausea and vomiting  Outcome: Progressing  Goal: Maintains or returns to baseline bowel function  Outcome: Progressing  Goal: Maintains adequate nutritional intake  Outcome: Progressing     Problem: Genitourinary - Adult  Goal: Absence of urinary retention  Outcome: Progressing     Problem: Infection - Adult  Goal: Absence of infection at discharge  Outcome: Progressing     Problem: Metabolic/Fluid and Electrolytes - Adult  Goal: Electrolytes maintained within normal limits  Outcome: Progressing  Goal: Hemodynamic stability and optimal renal function maintained  Outcome: Progressing  Goal: Glucose maintained within prescribed range  Outcome: Progressing     Problem: Hematologic - Adult  Goal: Maintains hematologic stability  Outcome: Progressing     Problem: Anxiety  Goal: Will report anxiety at manageable levels  Description: INTERVENTIONS:1. Administer medication as ordered2. Teach and rehearse alternative coping skills3. Provide emotional support with 1:1 interaction with staff  Outcome: Progressing     Problem: Coping  Goal: Pt/Family able to verbalize concerns and demonstrate effective coping strategies  Description: INTERVENTIONS:1. Assist patient/family to identify coping skills, available support systems and cultural and spiritual values2. Provide emotional support, including active listening and acknowledgement of concerns of patient and caregivers3. Reduce environmental stimuli, as able4. Instruct patient/family in relaxation techniques, as appropriate5. Assess for spiritual pain/suffering and initiate

## 2025-06-10 NOTE — PROGRESS NOTES
[facility]    Progress Note    Patient:  Cholo Bhakta  YOB: 1955  Date of Service: 6/10/2025  MRN: [id number]   Acct: [id number]   Primary Care Physician: Nirmal Nuno MD  Advance Directive: Full Code  Admit Date: 6/2/2025       Hospital Day: 8    Portions of this note have been copied forward, however, updated to reflect the most current clinical status of this patient.     CHIEF COMPLAINT: Fall    SUBJECTIVE: Seen in dialysis, no complaints over night    CUMULATIVE HOSPITAL COURSE:     This patient is a 69-year-old male with a past medical history of hypertension, hyperlipidemia, type 2 diabetes mellitus, CKD, chronic atrial fibrillation on anticoagulation, chronic systolic heart failure, CAD s/p CABG who presented to [facility] ED on 6/2/2025 after sustaining a fall.  He also had sacral pressure wound and left diabetic ulcer noted that he is seeing podiatry for.  ED workup revealed--CT head with no acute intracranial abnormality, CT cervical/lumbar spine with no acute osseous abnormality, CT pelvis with no acute findings, chest x-ray with no acute findings.  Creatinine 5.3, .  Patient was admitted to hospitalist service with consultation to nephrology and podiatry.  Patient was agreeable to dialysis.  He underwent permacath placement on 6/4.  He has been tolerating dialysis well.  Podiatry evaluated patient and felt there were no signs of acute infection, plan to follow-up with Dr. Heaton outpatient.  Podiatry was reconsulted after removing wound VAC with noted maceration to jeevan wound skin moderate odor to right plantar foot wound.  CT foot revealing cellulitis, without definite drainable abscess, chronic appearing erosions without definite acute osteomyelitis.  Patient does have known chronic ulcerations.  Podiatry did reevaluate 6/9 and recommended further treatment to be determined outpatient with patient's primary podiatrist.  Patient has had no leukocytosis and has remained  techniques as appropriate to the performed exam and include at least one of the following: Automated exposure control, adjustment of the mA and/or kV according to size, and the use of iterative reconstruction technique.  ______________________________________ Electronically signed by: MICHAEL SMITH M.D. Date:     06/02/2025 Time:    05:19       Culture Results:    No results for input(s): \"CXSURG\" in the last 720 hours.    Blood Culture Recent:   Recent Labs     06/02/25  0758   BC No growth after 5 days of incubation.       No results for input(s): \"BC\", \"BLOODCULT2\", \"ORG\" in the last 72 hours.    Assessment/Plan:   Principal Problem:  SANGEETA (acute kidney injury)  CKD stage 4  -Nephrology following  -S/p permacath placement 6/4  -Midodrine as needed during dialysis for hypotension  -Monitor I&Os  -Avoid nephrotoxic agents  -HD Tuesday, Thursday, Saturday  -Outpatient HD set up in progress  -Urinalysis negative for infection  -Social work following for DC planning     Left foot chronic ulceration  -Plan for outpatient arteriogram per vascular  -Wound care following  -CT foot reveals cellulitis without definite drainable abscess, chronic appearing erosions without definite acute osteomyelitis  -Continue IV Levaquin and clindamycin, day 4  -Podiatry reconsulted 6/9 with no plan for inpatient intervention     Chronic obstructive pulmonary disease (HCC)  -Stable, no signs of exacerbation     Essential hypertension  -Routine vital signs  -BP stable  -Continue medication regimen     Diabetes mellitus (HCC)  -Low dose SSI + 7 units with meals  -Accuchecks AC/HS  -Hypoglycemia protocol     CAD (coronary artery disease)  -Cardiology following  -Plan for outpatient ischemic work up  -Continue Lipitor  -Increased isordil  -No recurrent chest pain  -PRN EKGs  -Continuous telemetry  -Troponins flat     Memory loss  -Improving  -Continue home Aricept     Constipation  -No abdominal pain  -KUB revealed nonspecific bowel gas

## 2025-06-10 NOTE — DIALYSIS
[facility] INPATIENT SERVICES  DIALYSIS TREATMENT SUMMARY      Note: Consult with the attending physician for patient treatment orders, this document is not a physician order.      Patient Information   Patient Cholo Bhakta   Date of Birth June 18, 1955   Chart Number [id number]   Location [facility] MRN [id number]   Gender Male   SSN (last 4) [id number]     Treatment Information   Treatment Type Hemodialysis   Treatment Id [id number]   Start Time Melva 10, 2025 08:11   End Time Melva 10, 2025 11:44   Acutal Duration 03:33     Treatment Balances   Total Saline Administered 500   Net Fluid Balance -2000    Hemodialysis Orders   Therapy Standard   Orders Verified Time 06/10/2025 07:45    Date Verified 06/10/2025   Duration 3:30   Isolated UF/Bypass No   BFR (mL) 450   DFR (mL) 700   Dialyzer Type OPTIFLUX 160NR   UF Order UF Goal Ordered   UF Goal Ordered (mL) 2000   Crit-Line used No   Heparin Initial Units Bolus No   Heparin IV Maintenance Bolus No   Heparin IV Infusion No   Potassium (mEq/L) 3.0   Calcium (mEq/L) 2.5   Bicarb (mg/dL) 35   Sodium (mEq/L) 138   Clinician Stephanie Wilson, RN    Dialysis Access   Access Type Central Venous Catheter   Central Venous Catheter   Access Type Catheter - Tunneled   Date Central Venous Catheter Placed 06/04/2025   Access Location Chest Wall - R   Current/New [facility] Patient Yes   Surgeon Dr. Lyons   1st Use Catheter Verified by Previous Use   Catheter Care Completed per Policy Yes   Dressing Dry and Intact on Arrival Yes   Dressing Changed Yes   Type of Dressing Film Biopatch/CHG   Dressing Changed By [facility] Staff   Type of HD Caps Curos   HD Caps Changed Yes   CVC Line Education Provided Yes - Infection Prevention      Vitals   Pre-Treatment Vitals   Time Is BP being recorded? Pre BP Map BP Method Pulse RR Temp How was Weight Obtained? Pre Weight Previous Dry Weight Previous Post Weight Metric Target Fluid Removal (mL) Dialysate Confirmed Clinician

## 2025-06-10 NOTE — PROGRESS NOTES
HYDROcodone-acetaminophen (NORCO) 5-325 MG per tablet 1 tablet  1 tablet Oral Q4H PRN Aj Lyons MD   1 tablet at 06/07/25 2143    Or    HYDROcodone-acetaminophen (NORCO) 5-325 MG per tablet 2 tablet  2 tablet Oral Q4H PRN Aj Lyons MD   2 tablet at 06/09/25 2343    busPIRone (BUSPAR) tablet 10 mg  10 mg Oral TID PRN Aj Lyons MD   10 mg at 06/05/25 2317    calcitRIOL (ROCALTROL) capsule 0.25 mcg  0.25 mcg Oral Daily Aj Lyons MD   0.25 mcg at 06/09/25 0908    donepezil (ARICEPT) tablet 10 mg  10 mg Oral Daily jA Lyons MD   10 mg at 06/09/25 0908    vitamin D (ERGOCALCIFEROL) capsule 50,000 Units  50,000 Units Oral Weekly Aj Lyons MD   50,000 Units at 06/03/25 1249    ferrous sulfate (IRON 325) tablet 325 mg  325 mg Oral BID Aj Lyons MD   325 mg at 06/09/25 2026    pantoprazole (PROTONIX) tablet 40 mg  40 mg Oral Daily Aj Lyons MD   40 mg at 06/09/25 0907    rosuvastatin (CRESTOR) tablet 10 mg  10 mg Oral Nightly Aj Lyons MD   10 mg at 06/09/25 2026    pregabalin (LYRICA) capsule 50 mg  50 mg Oral BID Aj Lyons MD   50 mg at 06/09/25 2026    insulin lispro (HUMALOG,ADMELOG) injection vial 7 Units  7 Units SubCUTAneous TID  Aj Lyons MD   7 Units at 06/09/25 1731    sodium chloride flush 0.9 % injection 10 mL  10 mL IntraVENous PRN Aj Lyons MD        0.9 % sodium chloride infusion   IntraVENous PRN Aj Lyons MD        ondansetron (ZOFRAN-ODT) disintegrating tablet 4 mg  4 mg Oral Q8H PRN Aj Lyons MD        Or    ondansetron (ZOFRAN) injection 4 mg  4 mg IntraVENous Q6H PRN Aj Lyons MD   4 mg at 06/08/25 1621    magnesium hydroxide (MILK OF MAGNESIA) 400 MG/5ML suspension 30 mL  30 mL Oral Daily PRN Aj Lyons MD        acetaminophen (TYLENOL) tablet 650 mg  650 mg Oral Q6H PRN Aj Lyons MD   650 mg at 06/02/25 0826    Or    acetaminophen (TYLENOL) suppository 650 mg  650 mg  techniques as appropriate to the performed exam and include at least one of the following: Automated exposure control, adjustment of the mA and/or kV according to size, and the use of iterative reconstruction technique.  ______________________________________ Electronically signed by: MICHAEL SMITH M.D. Date:     06/02/2025 Time:    05:19        Assessment   End-stage renal disease  Diabetes type 2 with diabetic nephropathy  Peripheral vascular disease  Anemia in chronic kidney disease  Secondary hyperparathyroidism  Diabetic foot ulcer  Hyponatremia      Plan:  Discussed with patient, nursing  Workup reviewed today  Monitor labs  Dialysis currently Tuesday Thursday Saturday per routine scheduling  Cardiology evaluation reviewed as current (6/6)    Norbert Hatfield MD  06/10/25  10:24 AM

## 2025-06-10 NOTE — CARE COORDINATION
Spoke with Fouzia at [facility], they had issues with trying to get referral from [facility].  Called and gave her infor so she could get referral from [facility].  Referral sent for review. Still pending.  Insurance will require a precert  Outpt HD will be finalized once dc plan confirmed.  Electronically signed by Evie Carroll RN on 6/10/2025 at 12:58 PM

## 2025-06-11 LAB
ALBUMIN SERPL-MCNC: 3.7 G/DL (ref 3.5–5.2)
ALP SERPL-CCNC: 133 U/L (ref 40–129)
ALT SERPL-CCNC: 7 U/L (ref 10–50)
ANION GAP SERPL CALCULATED.3IONS-SCNC: 15 MMOL/L (ref 8–16)
AST SERPL-CCNC: 31 U/L (ref 10–50)
BASOPHILS # BLD: 0.1 K/UL (ref 0–0.2)
BASOPHILS NFR BLD: 0.7 % (ref 0–1)
BILIRUB SERPL-MCNC: 0.5 MG/DL (ref 0.2–1.2)
BUN SERPL-MCNC: 35 MG/DL (ref 8–23)
CALCIUM SERPL-MCNC: 9.2 MG/DL (ref 8.8–10.2)
CHLORIDE SERPL-SCNC: 97 MMOL/L (ref 98–107)
CO2 SERPL-SCNC: 25 MMOL/L (ref 22–29)
CREAT SERPL-MCNC: 5 MG/DL (ref 0.7–1.2)
EOSINOPHIL # BLD: 0.2 K/UL (ref 0–0.6)
EOSINOPHIL NFR BLD: 1.8 % (ref 0–5)
ERYTHROCYTE [DISTWIDTH] IN BLOOD BY AUTOMATED COUNT: 14.8 % (ref 11.5–14.5)
GLUCOSE BLD-MCNC: 126 MG/DL (ref 70–99)
GLUCOSE BLD-MCNC: 137 MG/DL (ref 70–99)
GLUCOSE BLD-MCNC: 148 MG/DL (ref 70–99)
GLUCOSE BLD-MCNC: 155 MG/DL (ref 70–99)
GLUCOSE SERPL-MCNC: 157 MG/DL (ref 70–99)
HCT VFR BLD AUTO: 36.6 % (ref 42–52)
HGB BLD-MCNC: 12.1 G/DL (ref 14–18)
IMM GRANULOCYTES # BLD: 0.1 K/UL
LYMPHOCYTES # BLD: 1.3 K/UL (ref 1.1–4.5)
LYMPHOCYTES NFR BLD: 14.9 % (ref 20–40)
MCH RBC QN AUTO: 29.1 PG (ref 27–31)
MCHC RBC AUTO-ENTMCNC: 33.1 G/DL (ref 33–37)
MCV RBC AUTO: 88 FL (ref 80–94)
MONOCYTES # BLD: 1.4 K/UL (ref 0–0.9)
MONOCYTES NFR BLD: 15 % (ref 0–10)
NEUTROPHILS # BLD: 6 K/UL (ref 1.5–7.5)
NEUTS SEG NFR BLD: 66.7 % (ref 50–65)
PERFORMED ON: ABNORMAL
PLATELET # BLD AUTO: 165 K/UL (ref 130–400)
PMV BLD AUTO: 11.7 FL (ref 9.4–12.4)
POTASSIUM SERPL-SCNC: 4 MMOL/L (ref 3.5–5)
PROT SERPL-MCNC: 7.2 G/DL (ref 6.4–8.3)
RBC # BLD AUTO: 4.16 M/UL (ref 4.7–6.1)
SODIUM SERPL-SCNC: 137 MMOL/L (ref 136–145)
TROPONIN, HIGH SENSITIVITY: 127 NG/L (ref 0–22)
WBC # BLD AUTO: 9 K/UL (ref 4.8–10.8)

## 2025-06-11 PROCEDURE — 94760 N-INVAS EAR/PLS OXIMETRY 1: CPT

## 2025-06-11 PROCEDURE — 95816 EEG AWAKE AND DROWSY: CPT | Performed by: PSYCHIATRY & NEUROLOGY

## 2025-06-11 PROCEDURE — 6370000000 HC RX 637 (ALT 250 FOR IP): Performed by: HOSPITALIST

## 2025-06-11 PROCEDURE — 6370000000 HC RX 637 (ALT 250 FOR IP): Performed by: INTERNAL MEDICINE

## 2025-06-11 PROCEDURE — 6370000000 HC RX 637 (ALT 250 FOR IP)

## 2025-06-11 PROCEDURE — 51798 US URINE CAPACITY MEASURE: CPT

## 2025-06-11 PROCEDURE — 6370000000 HC RX 637 (ALT 250 FOR IP): Performed by: SURGERY

## 2025-06-11 PROCEDURE — 82962 GLUCOSE BLOOD TEST: CPT

## 2025-06-11 PROCEDURE — 95819 EEG AWAKE AND ASLEEP: CPT

## 2025-06-11 PROCEDURE — 84484 ASSAY OF TROPONIN QUANT: CPT

## 2025-06-11 PROCEDURE — 6360000002 HC RX W HCPCS: Performed by: SURGERY

## 2025-06-11 PROCEDURE — 2500000003 HC RX 250 WO HCPCS: Performed by: ANESTHESIOLOGY

## 2025-06-11 PROCEDURE — 1200000000 HC SEMI PRIVATE

## 2025-06-11 PROCEDURE — 80053 COMPREHEN METABOLIC PANEL: CPT

## 2025-06-11 PROCEDURE — 85025 COMPLETE CBC W/AUTO DIFF WBC: CPT

## 2025-06-11 PROCEDURE — 99222 1ST HOSP IP/OBS MODERATE 55: CPT | Performed by: PSYCHIATRY & NEUROLOGY

## 2025-06-11 PROCEDURE — 6360000002 HC RX W HCPCS

## 2025-06-11 PROCEDURE — 36415 COLL VENOUS BLD VENIPUNCTURE: CPT

## 2025-06-11 PROCEDURE — 6370000000 HC RX 637 (ALT 250 FOR IP): Performed by: STUDENT IN AN ORGANIZED HEALTH CARE EDUCATION/TRAINING PROGRAM

## 2025-06-11 RX ORDER — BUTALBITAL, ACETAMINOPHEN AND CAFFEINE 50; 325; 40 MG/1; MG/1; MG/1
1 TABLET ORAL EVERY 6 HOURS PRN
Status: DISCONTINUED | OUTPATIENT
Start: 2025-06-11 | End: 2025-06-12

## 2025-06-11 RX ORDER — SUMATRIPTAN 6 MG/.5ML
6 INJECTION, SOLUTION SUBCUTANEOUS ONCE
Status: COMPLETED | OUTPATIENT
Start: 2025-06-11 | End: 2025-06-11

## 2025-06-11 RX ORDER — LEVOFLOXACIN 5 MG/ML
500 INJECTION, SOLUTION INTRAVENOUS
Status: DISCONTINUED | OUTPATIENT
Start: 2025-06-12 | End: 2025-06-11

## 2025-06-11 RX ADMIN — FERROUS SULFATE TAB 325 MG (65 MG ELEMENTAL FE) 325 MG: 325 (65 FE) TAB at 08:29

## 2025-06-11 RX ADMIN — INSULIN GLARGINE 10 UNITS: 100 INJECTION, SOLUTION SUBCUTANEOUS at 21:29

## 2025-06-11 RX ADMIN — DAKIN'S SOLUTION 0.125% (QUARTER STRENGTH): 0.12 SOLUTION at 10:48

## 2025-06-11 RX ADMIN — SENNOSIDES AND DOCUSATE SODIUM 2 TABLET: 50; 8.6 TABLET ORAL at 21:30

## 2025-06-11 RX ADMIN — FAMOTIDINE 20 MG: 20 TABLET, FILM COATED ORAL at 08:30

## 2025-06-11 RX ADMIN — PREGABALIN 50 MG: 50 CAPSULE ORAL at 08:30

## 2025-06-11 RX ADMIN — SENNOSIDES AND DOCUSATE SODIUM 2 TABLET: 50; 8.6 TABLET ORAL at 08:28

## 2025-06-11 RX ADMIN — SODIUM CHLORIDE, PRESERVATIVE FREE 10 ML: 5 INJECTION INTRAVENOUS at 21:30

## 2025-06-11 RX ADMIN — LACTULOSE 20 G: 20 SOLUTION ORAL at 21:30

## 2025-06-11 RX ADMIN — ISOSORBIDE DINITRATE 20 MG: 10 TABLET ORAL at 13:44

## 2025-06-11 RX ADMIN — PANTOPRAZOLE SODIUM 40 MG: 40 TABLET, DELAYED RELEASE ORAL at 08:30

## 2025-06-11 RX ADMIN — SUMATRIPTAN SUCCINATE 6 MG: 6 INJECTION SUBCUTANEOUS at 00:20

## 2025-06-11 RX ADMIN — LACTULOSE 20 G: 20 SOLUTION ORAL at 08:28

## 2025-06-11 RX ADMIN — METOCLOPRAMIDE HYDROCHLORIDE 5 MG: 5 INJECTION INTRAMUSCULAR; INTRAVENOUS at 13:45

## 2025-06-11 RX ADMIN — HEPARIN SODIUM 5000 UNITS: 5000 INJECTION INTRAVENOUS; SUBCUTANEOUS at 05:06

## 2025-06-11 RX ADMIN — MIDODRINE HYDROCHLORIDE 5 MG: 5 TABLET ORAL at 17:05

## 2025-06-11 RX ADMIN — METOCLOPRAMIDE HYDROCHLORIDE 5 MG: 5 INJECTION INTRAMUSCULAR; INTRAVENOUS at 21:29

## 2025-06-11 RX ADMIN — DONEPEZIL HYDROCHLORIDE 10 MG: 10 TABLET, FILM COATED ORAL at 08:29

## 2025-06-11 RX ADMIN — ISOSORBIDE DINITRATE 20 MG: 10 TABLET ORAL at 17:05

## 2025-06-11 RX ADMIN — INSULIN LISPRO 7 UNITS: 100 INJECTION, SOLUTION INTRAVENOUS; SUBCUTANEOUS at 08:31

## 2025-06-11 RX ADMIN — CALCITRIOL CAPSULES 0.25 MCG 0.25 MCG: 0.25 CAPSULE ORAL at 08:30

## 2025-06-11 RX ADMIN — METOPROLOL SUCCINATE 25 MG: 25 TABLET, EXTENDED RELEASE ORAL at 08:30

## 2025-06-11 RX ADMIN — Medication 10 MG: at 21:29

## 2025-06-11 RX ADMIN — APIXABAN 5 MG: 5 TABLET, FILM COATED ORAL at 21:30

## 2025-06-11 RX ADMIN — METOCLOPRAMIDE HYDROCHLORIDE 5 MG: 5 INJECTION INTRAMUSCULAR; INTRAVENOUS at 08:30

## 2025-06-11 RX ADMIN — ESCITALOPRAM OXALATE 10 MG: 10 TABLET ORAL at 08:30

## 2025-06-11 RX ADMIN — APIXABAN 5 MG: 5 TABLET, FILM COATED ORAL at 08:43

## 2025-06-11 RX ADMIN — INSULIN LISPRO 7 UNITS: 100 INJECTION, SOLUTION INTRAVENOUS; SUBCUTANEOUS at 12:04

## 2025-06-11 RX ADMIN — INSULIN LISPRO 7 UNITS: 100 INJECTION, SOLUTION INTRAVENOUS; SUBCUTANEOUS at 17:05

## 2025-06-11 RX ADMIN — MIDODRINE HYDROCHLORIDE 5 MG: 5 TABLET ORAL at 12:04

## 2025-06-11 RX ADMIN — POLYETHYLENE GLYCOL 3350 17 G: 17 POWDER, FOR SOLUTION ORAL at 08:28

## 2025-06-11 RX ADMIN — FERROUS SULFATE TAB 325 MG (65 MG ELEMENTAL FE) 325 MG: 325 (65 FE) TAB at 21:29

## 2025-06-11 RX ADMIN — HYDROCODONE BITARTRATE AND ACETAMINOPHEN 2 TABLET: 5; 325 TABLET ORAL at 08:28

## 2025-06-11 RX ADMIN — MIDODRINE HYDROCHLORIDE 5 MG: 5 TABLET ORAL at 08:29

## 2025-06-11 RX ADMIN — PREGABALIN 50 MG: 50 CAPSULE ORAL at 21:30

## 2025-06-11 RX ADMIN — CLINDAMYCIN PHOSPHATE 900 MG: 900 INJECTION, SOLUTION INTRAVENOUS at 08:41

## 2025-06-11 RX ADMIN — MICONAZOLE NITRATE: 2 POWDER TOPICAL at 21:28

## 2025-06-11 RX ADMIN — MICONAZOLE NITRATE: 2 POWDER TOPICAL at 08:31

## 2025-06-11 RX ADMIN — ROSUVASTATIN 10 MG: 10 TABLET, FILM COATED ORAL at 21:30

## 2025-06-11 RX ADMIN — INSULIN GLARGINE 10 UNITS: 100 INJECTION, SOLUTION SUBCUTANEOUS at 08:30

## 2025-06-11 RX ADMIN — ISOSORBIDE DINITRATE 20 MG: 10 TABLET ORAL at 08:30

## 2025-06-11 RX ADMIN — SODIUM CHLORIDE, PRESERVATIVE FREE 10 ML: 5 INJECTION INTRAVENOUS at 08:43

## 2025-06-11 RX ADMIN — CLINDAMYCIN PHOSPHATE 900 MG: 900 INJECTION, SOLUTION INTRAVENOUS at 17:06

## 2025-06-11 RX ADMIN — LACTULOSE 20 G: 20 SOLUTION ORAL at 13:45

## 2025-06-11 RX ADMIN — DAKIN'S SOLUTION 0.125% (QUARTER STRENGTH): 0.12 SOLUTION at 21:29

## 2025-06-11 RX ADMIN — METOCLOPRAMIDE HYDROCHLORIDE 5 MG: 5 INJECTION INTRAMUSCULAR; INTRAVENOUS at 00:11

## 2025-06-11 ASSESSMENT — PAIN SCALES - GENERAL: PAINLEVEL_OUTOF10: 7

## 2025-06-11 NOTE — PROGRESS NOTES
Nephrology ([facility]) Progress Note    Patient:  Cholo Bhakta  YOB: 1955  Date of Service: 6/11/2025  MRN: [id number]   Acct: [id number]   Primary Care Physician: Nirmal Nuno MD  Advance Directive: Full Code  Admit Date: 6/2/2025       Hospital Day: 9  Referring Provider: Sarbjit Steve MD    Patient independently seen and examined, Chart, Consults, Notes, Operative notes, Labs, Cardiology, and Radiology studies reviewed as available.    Subjective:  Cholo Bhakta is a 69 y.o. male for whom we were consulted for evaluation and treatment of chronic kidney disease progressing to end-stage renal disease.  Patient has multiple medical history including morbid obesity, CAD, CABG, PVD, CVA, CHF, diabetic foot ulcer and hypertension.  He has been followed for multiple admissions in the office for many years.  He had previously declined dialysis initiation but was agreeable after meeting with Dr. Freed last week.    Today, patient had an episode of seizure-like activity yesterday evening.  Still with some headache today.  Denied current chest pain, shortness of air at rest, nausea or vomiting.  Awaiting outpatient dialysis set up.          Allergies:  Cefepime, Bactrim [sulfamethoxazole-trimethoprim], and Metronidazole    Medicines:  Current Facility-Administered Medications   Medication Dose Route Frequency Provider Last Rate Last Admin    apixaban (ELIQUIS) tablet 5 mg  5 mg Oral BID Valeria Lynch APRN - CNP   5 mg at 06/11/25 0843    butalbital-acetaminophen-caffeine (FIORICET, ESGIC) per tablet 1 tablet  1 tablet Oral Q6H PRN Valeria Lynch APRN - CNP        [START ON 6/12/2025] levoFLOXacin (LEVAQUIN) 500 MG/100ML infusion 500 mg  500 mg IntraVENous Q48H Ramu Esposito APRN - CNP        bisacodyl (DULCOLAX) suppository 10 mg  10 mg Rectal Once Valeria Lynch APRN - CNP        miconazole (MICOTIN) 2 % powder   Topical BID Adrián Benz MD   Given at

## 2025-06-11 NOTE — PLAN OF CARE
SUBJECTIVE:    Migraine as per mccoy RN, stated to have had Fioricet without help      OBJECTIVE:    /63   Pulse 80   Temp 98.8 °F (37.1 °C) (Oral)   Resp 16   Ht 1.829 m (6' 0.01\")   Wt 124.1 kg (273 lb 8 oz)   SpO2 98%   BMI 37.09 kg/m²       ASSESSMENTS & PLANS:    Migraine  Sumatriptan SQ x once

## 2025-06-11 NOTE — PLAN OF CARE
Problem: Neurosensory - Adult  Goal: Achieves stable or improved neurological status  Outcome: Progressing     Problem: Cardiovascular - Adult  Goal: Maintains optimal cardiac output and hemodynamic stability  Outcome: Progressing     Problem: Skin/Tissue Integrity - Adult  Goal: Skin integrity remains intact  Description: 1.  Monitor for areas of redness and/or skin breakdown2.  Assess vascular access sites hourly3.  Every 4-6 hours minimum:  Change oxygen saturation probe site4.  Every 4-6 hours:  If on nasal continuous positive airway pressure, respiratory therapy assess nares and determine need for appliance change or resting period  Outcome: Progressing     Problem: Metabolic/Fluid and Electrolytes - Adult  Goal: Electrolytes maintained within normal limits  Outcome: Progressing     Problem: Metabolic/Fluid and Electrolytes - Adult  Goal: Hemodynamic stability and optimal renal function maintained  Outcome: Progressing

## 2025-06-11 NOTE — PROGRESS NOTES
[facility]    [facility]    Patient:  Cholo Bhakta  YOB: 1955  Date of Service: 6/11/2025  MRN: [id number]   Acct: [id number]   Primary Care Physician: Nirmal Nuno MD  Advance Directive: Full Code  Admit Date: 6/2/2025       Hospital Day: 9    Portions of this note have been copied forward, however, updated to reflect the most current clinical status of this patient.     CHIEF COMPLAINT: Fall    SUBJECTIVE: Complaining of mild headache still, no further seizure like activity    CUMULATIVE HOSPITAL COURSE:     This patient is a 69-year-old male with a past medical history of hypertension, hyperlipidemia, type 2 diabetes mellitus, CKD, chronic atrial fibrillation on anticoagulation, chronic systolic heart failure, CAD s/p CABG who presented to [facility] ED on 6/2/2025 after sustaining a fall.  He also has a sacral pressure wound and left diabetic ulcer noted that he is seeing podiatry for.  ED workup revealed--CT head with no acute intracranial abnormality, CT cervical/lumbar spine with no acute osseous abnormality, CT pelvis with no acute findings, chest x-ray with no acute findings.  Creatinine 5.3, .  Patient was admitted to hospitalist service with consultation to nephrology and podiatry.  Patient was agreeable to dialysis.  He underwent permacath placement on 6/4.  He has been tolerating dialysis well.  Podiatry evaluated patient and felt there were no signs of acute infection, plan to follow-up with Dr. Heaton outpatient.  Podiatry was reconsulted after removing wound VAC with noted maceration to jeevan wound skin moderate odor to right plantar foot wound.  CT foot revealing cellulitis, without definite drainable abscess, chronic appearing erosions without definite acute osteomyelitis.  Patient does have known chronic ulcerations.  Podiatry did re-evaluate 6/9 and recommended further treatment to be determined outpatient with patient's primary podiatrist.  Patient has had no  TECHNIQUE:  CT of the cervical spine with multiplanar reformations.  FINDINGS:  Reformatted images demonstrate normal alignment with preservation of vertebral body height. Prior fusion C5 - C7.  No fracture seen on the axial or reformatted images. No acute surrounding soft tissue abnormalitites. Lung apices are clear.      No acute findings in the cervical spine.  All CT scans are performed using dose optimization techniques as appropriate to the performed exam and include at least one of the following: Automated exposure control, adjustment of the mA and/or kV according to size, and the use of iterative reconstruction technique.  ______________________________________ Electronically signed by: MICHAEL SMITH M.D. Date:     06/02/2025 Time:    05:19       Culture Results:    No results for input(s): \"CXSURG\" in the last 720 hours.    Blood Culture Recent:   Recent Labs     06/02/25  0758   BC No growth after 5 days of incubation.       No results for input(s): \"BC\", \"BLOODCULT2\", \"ORG\" in the last 72 hours.    Assessment/Plan:   Principal Problem:  SANGEETA (acute kidney injury)  CKD stage 4  -Nephrology following  -S/p permacath placement 6/4  -Midodrine as needed during dialysis for hypotension  -Monitor I&Os  -Avoid nephrotoxic agents  -HD Tuesday, Thursday, Saturday  -Outpatient HD set up in progress  -Urinalysis negative for infection  -Social work following for DC planning     Left foot chronic ulceration  -Plan for outpatient arteriogram per vascular  -Wound care following  -CT foot reveals cellulitis without definite drainable abscess, chronic appearing erosions without definite acute osteomyelitis  -Continue IV clindamycin, day 6  -IV Levaquin discontinued as it can lower seizure threshold, received 3 days worth  -Podiatry reconsulted 6/9 with no plan for inpatient intervention  -Continue wound care     Chronic obstructive pulmonary disease (HCC)  -Stable, no signs of exacerbation     Essential

## 2025-06-11 NOTE — CARE COORDINATION
[facility] has accepted Pt pending precert; HD chair time still pending for transport friendly chair. Also have to ensure Pt will accept offer from SNF as wife was wanting him to go, uncertain if he will remain agreeable.   Electronically signed by KENDALL Currie on 6/11/2025 at 9:36 AM

## 2025-06-11 NOTE — PROGRESS NOTES
This  visited with pt to follow up and provide spiritual care. Pt's wife was present during the visit. She says pt had a seizure yesterday in the evening. This  provided spiritual care with sustaining presence, support, and prayer. Pt and his wife expressed gratitude for spiritual care.       Spiritual Health History and Assessment/Progress Note  [facility]    Spiritual/Emotional Needs,  ,  ,      Name: Cholo Bhakta MRN: [id number]    Age: 69 y.o.     Sex: male   Language: English   Spiritism: Alevism   SANGEETA (acute kidney injury)     Date: 6/11/2025            Total Time Calculated: 8 min              Spiritual Assessment continued in [facility] 3 KENNETH/VAS/MED        Referral/Consult From: Palliative Care   Encounter Overview/Reason: Spiritual/Emotional Needs  Service Provided For: Patient, Family    Ana María, Belief, Meaning:   Patient identifies as spiritual and is connected with a ana maría tradition or spiritual practice  Family/Friends identify as spiritual and are connected with a ana maría tradition or spiritual practice      Importance and Influence:  Patient has spiritual/personal beliefs that influence decisions regarding their health  Family/Friends have spiritual/personal beliefs that influence decisions regarding the patient's health    Community:  Patient is connected with a spiritual community  Family/Friends are connected with a spiritual community:    Assessment and Plan of Care:     Patient Interventions include: Provided sacramental/Christianity ritual  Family/Friends Interventions include: Provided sacramental/Christianity ritual    Patient Plan of Care: Spiritual Care available upon further referral  Family/Friends Plan of Care: Spiritual Care available upon further referral    Electronically signed by Mary Behrens, Chaplain on 6/11/2025 at 2:29 PM

## 2025-06-11 NOTE — CARE COORDINATION
Per Fouzia at [facility] they can offer on Pt; SW visited with Pt and wife; wife stated that this is still the plan for d/c; uncertain of when precert to be initiated as Pt noted with seizure last night; also need confirmation on HD chair time.  [facility]--pending precert and chair time  [phone] p  [phone] f  Electronically signed by KENDALL Currie on 6/11/2025 at 3:42 PM

## 2025-06-11 NOTE — CONSULTS
GLUCOSE 118* 214* 157*     Hepatic:   Recent Labs     06/10/25  0431 06/10/25  1729 06/11/25  0224   AST 28 34 31   ALT 8* 11 7*   BILITOT 0.6 0.6 0.5   ALKPHOS 130* 141* 133*     Lipids: No results for input(s): \"CHOL\", \"HDL\" in the last 72 hours.    Invalid input(s): \"LDLCALCU\"  INR: No results for input(s): \"INR\" in the last 72 hours.  ?  ?  Assessment and Plan   1.  Seizure-like activity.  Unclear if this was an epileptic event or not.  Check EEG.  Hold off on AED for now.  Would probably avoid Levaquin as this can lower the seizure threshold.  Will follow with you.  ?  ?      Sajan Faith MD  Board Certified in Neurology

## 2025-06-12 LAB
ALBUMIN SERPL-MCNC: 3.7 G/DL (ref 3.5–5.2)
ALP SERPL-CCNC: 123 U/L (ref 40–129)
ALT SERPL-CCNC: 9 U/L (ref 10–50)
ANION GAP SERPL CALCULATED.3IONS-SCNC: 17 MMOL/L (ref 8–16)
AST SERPL-CCNC: 32 U/L (ref 10–50)
BASOPHILS # BLD: 0.1 K/UL (ref 0–0.2)
BASOPHILS NFR BLD: 0.8 % (ref 0–1)
BILIRUB SERPL-MCNC: 0.6 MG/DL (ref 0.2–1.2)
BUN SERPL-MCNC: 45 MG/DL (ref 8–23)
CALCIUM SERPL-MCNC: 9.3 MG/DL (ref 8.8–10.2)
CHLORIDE SERPL-SCNC: 96 MMOL/L (ref 98–107)
CO2 SERPL-SCNC: 25 MMOL/L (ref 22–29)
CREAT SERPL-MCNC: 6.3 MG/DL (ref 0.7–1.2)
EOSINOPHIL # BLD: 0.5 K/UL (ref 0–0.6)
EOSINOPHIL NFR BLD: 6.8 % (ref 0–5)
ERYTHROCYTE [DISTWIDTH] IN BLOOD BY AUTOMATED COUNT: 15 % (ref 11.5–14.5)
GLUCOSE BLD-MCNC: 120 MG/DL (ref 70–99)
GLUCOSE BLD-MCNC: 135 MG/DL (ref 70–99)
GLUCOSE BLD-MCNC: 137 MG/DL (ref 70–99)
GLUCOSE BLD-MCNC: 270 MG/DL (ref 70–99)
GLUCOSE SERPL-MCNC: 124 MG/DL (ref 70–99)
HCT VFR BLD AUTO: 37.9 % (ref 42–52)
HGB BLD-MCNC: 12 G/DL (ref 14–18)
IMM GRANULOCYTES # BLD: 0.1 K/UL
LYMPHOCYTES # BLD: 1 K/UL (ref 1.1–4.5)
LYMPHOCYTES NFR BLD: 14.3 % (ref 20–40)
MCH RBC QN AUTO: 28.8 PG (ref 27–31)
MCHC RBC AUTO-ENTMCNC: 31.7 G/DL (ref 33–37)
MCV RBC AUTO: 90.9 FL (ref 80–94)
MONOCYTES # BLD: 1 K/UL (ref 0–0.9)
MONOCYTES NFR BLD: 13.5 % (ref 0–10)
NEUTROPHILS # BLD: 4.6 K/UL (ref 1.5–7.5)
NEUTS SEG NFR BLD: 63.9 % (ref 50–65)
PERFORMED ON: ABNORMAL
PLATELET # BLD AUTO: 174 K/UL (ref 130–400)
PMV BLD AUTO: 12.4 FL (ref 9.4–12.4)
POTASSIUM SERPL-SCNC: 4.2 MMOL/L (ref 3.5–5)
PROT SERPL-MCNC: 7 G/DL (ref 6.4–8.3)
RBC # BLD AUTO: 4.17 M/UL (ref 4.7–6.1)
SODIUM SERPL-SCNC: 138 MMOL/L (ref 136–145)
WBC # BLD AUTO: 7.2 K/UL (ref 4.8–10.8)

## 2025-06-12 PROCEDURE — 1200000000 HC SEMI PRIVATE

## 2025-06-12 PROCEDURE — 6370000000 HC RX 637 (ALT 250 FOR IP): Performed by: SURGERY

## 2025-06-12 PROCEDURE — 2500000003 HC RX 250 WO HCPCS: Performed by: ANESTHESIOLOGY

## 2025-06-12 PROCEDURE — 6370000000 HC RX 637 (ALT 250 FOR IP): Performed by: INTERNAL MEDICINE

## 2025-06-12 PROCEDURE — 99232 SBSQ HOSP IP/OBS MODERATE 35: CPT | Performed by: PSYCHIATRY & NEUROLOGY

## 2025-06-12 PROCEDURE — 6360000002 HC RX W HCPCS

## 2025-06-12 PROCEDURE — 97110 THERAPEUTIC EXERCISES: CPT

## 2025-06-12 PROCEDURE — 8010000000 HC HEMODIALYSIS ACUTE INPT

## 2025-06-12 PROCEDURE — 94760 N-INVAS EAR/PLS OXIMETRY 1: CPT

## 2025-06-12 PROCEDURE — 36415 COLL VENOUS BLD VENIPUNCTURE: CPT

## 2025-06-12 PROCEDURE — 85025 COMPLETE CBC W/AUTO DIFF WBC: CPT

## 2025-06-12 PROCEDURE — 51701 INSERT BLADDER CATHETER: CPT

## 2025-06-12 PROCEDURE — 80053 COMPREHEN METABOLIC PANEL: CPT

## 2025-06-12 PROCEDURE — 82962 GLUCOSE BLOOD TEST: CPT

## 2025-06-12 PROCEDURE — 6370000000 HC RX 637 (ALT 250 FOR IP)

## 2025-06-12 PROCEDURE — 6370000000 HC RX 637 (ALT 250 FOR IP): Performed by: PSYCHIATRY & NEUROLOGY

## 2025-06-12 PROCEDURE — 51798 US URINE CAPACITY MEASURE: CPT

## 2025-06-12 PROCEDURE — 97530 THERAPEUTIC ACTIVITIES: CPT

## 2025-06-12 PROCEDURE — 6370000000 HC RX 637 (ALT 250 FOR IP): Performed by: STUDENT IN AN ORGANIZED HEALTH CARE EDUCATION/TRAINING PROGRAM

## 2025-06-12 RX ORDER — BUTALBITAL, ACETAMINOPHEN AND CAFFEINE 50; 325; 40 MG/1; MG/1; MG/1
1 TABLET ORAL EVERY 4 HOURS PRN
Status: DISCONTINUED | OUTPATIENT
Start: 2025-06-12 | End: 2025-06-14 | Stop reason: HOSPADM

## 2025-06-12 RX ORDER — METOCLOPRAMIDE 5 MG/1
5 TABLET ORAL 3 TIMES DAILY PRN
Status: DISCONTINUED | OUTPATIENT
Start: 2025-06-12 | End: 2025-06-14 | Stop reason: HOSPADM

## 2025-06-12 RX ORDER — SUMATRIPTAN SUCCINATE 100 MG/1
50 TABLET ORAL ONCE
Status: COMPLETED | OUTPATIENT
Start: 2025-06-12 | End: 2025-06-12

## 2025-06-12 RX ADMIN — ESCITALOPRAM OXALATE 10 MG: 10 TABLET ORAL at 08:46

## 2025-06-12 RX ADMIN — INSULIN GLARGINE 10 UNITS: 100 INJECTION, SOLUTION SUBCUTANEOUS at 20:14

## 2025-06-12 RX ADMIN — MICONAZOLE NITRATE: 2 POWDER TOPICAL at 20:14

## 2025-06-12 RX ADMIN — CALCITRIOL CAPSULES 0.25 MCG 0.25 MCG: 0.25 CAPSULE ORAL at 08:46

## 2025-06-12 RX ADMIN — CLINDAMYCIN PHOSPHATE 900 MG: 900 INJECTION, SOLUTION INTRAVENOUS at 00:02

## 2025-06-12 RX ADMIN — SUMATRIPTAN SUCCINATE 50 MG: 100 TABLET ORAL at 13:03

## 2025-06-12 RX ADMIN — ROSUVASTATIN 10 MG: 10 TABLET, FILM COATED ORAL at 20:14

## 2025-06-12 RX ADMIN — MIDODRINE HYDROCHLORIDE 5 MG: 5 TABLET ORAL at 11:05

## 2025-06-12 RX ADMIN — BUSPIRONE HYDROCHLORIDE 10 MG: 10 TABLET ORAL at 20:12

## 2025-06-12 RX ADMIN — HYDROCODONE BITARTRATE AND ACETAMINOPHEN 2 TABLET: 5; 325 TABLET ORAL at 15:19

## 2025-06-12 RX ADMIN — APIXABAN 5 MG: 5 TABLET, FILM COATED ORAL at 20:13

## 2025-06-12 RX ADMIN — MIDODRINE HYDROCHLORIDE 5 MG: 5 TABLET ORAL at 16:45

## 2025-06-12 RX ADMIN — FERROUS SULFATE TAB 325 MG (65 MG ELEMENTAL FE) 325 MG: 325 (65 FE) TAB at 20:11

## 2025-06-12 RX ADMIN — INSULIN LISPRO 2 UNITS: 100 INJECTION, SOLUTION INTRAVENOUS; SUBCUTANEOUS at 20:15

## 2025-06-12 RX ADMIN — FERROUS SULFATE TAB 325 MG (65 MG ELEMENTAL FE) 325 MG: 325 (65 FE) TAB at 08:45

## 2025-06-12 RX ADMIN — PREGABALIN 50 MG: 50 CAPSULE ORAL at 20:12

## 2025-06-12 RX ADMIN — LACTULOSE 20 G: 20 SOLUTION ORAL at 08:46

## 2025-06-12 RX ADMIN — DONEPEZIL HYDROCHLORIDE 10 MG: 10 TABLET, FILM COATED ORAL at 08:45

## 2025-06-12 RX ADMIN — FAMOTIDINE 20 MG: 20 TABLET, FILM COATED ORAL at 08:46

## 2025-06-12 RX ADMIN — METOCLOPRAMIDE HYDROCHLORIDE 5 MG: 5 INJECTION INTRAMUSCULAR; INTRAVENOUS at 08:46

## 2025-06-12 RX ADMIN — SODIUM CHLORIDE, PRESERVATIVE FREE 10 ML: 5 INJECTION INTRAVENOUS at 08:47

## 2025-06-12 RX ADMIN — SENNOSIDES AND DOCUSATE SODIUM 2 TABLET: 50; 8.6 TABLET ORAL at 20:11

## 2025-06-12 RX ADMIN — MICONAZOLE NITRATE: 2 POWDER TOPICAL at 08:48

## 2025-06-12 RX ADMIN — INSULIN GLARGINE 10 UNITS: 100 INJECTION, SOLUTION SUBCUTANEOUS at 08:47

## 2025-06-12 RX ADMIN — METOCLOPRAMIDE HYDROCHLORIDE 5 MG: 5 INJECTION INTRAMUSCULAR; INTRAVENOUS at 00:57

## 2025-06-12 RX ADMIN — ISOSORBIDE DINITRATE 20 MG: 10 TABLET ORAL at 16:45

## 2025-06-12 RX ADMIN — HYDROCODONE BITARTRATE AND ACETAMINOPHEN 2 TABLET: 5; 325 TABLET ORAL at 20:12

## 2025-06-12 RX ADMIN — METOPROLOL SUCCINATE 25 MG: 25 TABLET, EXTENDED RELEASE ORAL at 08:46

## 2025-06-12 RX ADMIN — APIXABAN 5 MG: 5 TABLET, FILM COATED ORAL at 08:46

## 2025-06-12 RX ADMIN — INSULIN LISPRO 7 UNITS: 100 INJECTION, SOLUTION INTRAVENOUS; SUBCUTANEOUS at 17:55

## 2025-06-12 RX ADMIN — CLINDAMYCIN PHOSPHATE 900 MG: 900 INJECTION, SOLUTION INTRAVENOUS at 16:49

## 2025-06-12 RX ADMIN — PANTOPRAZOLE SODIUM 40 MG: 40 TABLET, DELAYED RELEASE ORAL at 08:46

## 2025-06-12 RX ADMIN — Medication 10 MG: at 20:13

## 2025-06-12 RX ADMIN — LACTULOSE 20 G: 20 SOLUTION ORAL at 15:19

## 2025-06-12 RX ADMIN — LACTULOSE 20 G: 20 SOLUTION ORAL at 20:14

## 2025-06-12 RX ADMIN — DAKIN'S SOLUTION 0.125% (QUARTER STRENGTH): 0.12 SOLUTION at 23:28

## 2025-06-12 RX ADMIN — SODIUM CHLORIDE, PRESERVATIVE FREE 10 ML: 5 INJECTION INTRAVENOUS at 20:14

## 2025-06-12 RX ADMIN — BUTALBITAL, ACETAMINOPHEN, AND CAFFEINE 1 TABLET: 325; 50; 40 TABLET ORAL at 12:39

## 2025-06-12 RX ADMIN — CLINDAMYCIN PHOSPHATE 900 MG: 900 INJECTION, SOLUTION INTRAVENOUS at 08:56

## 2025-06-12 RX ADMIN — SENNOSIDES AND DOCUSATE SODIUM 2 TABLET: 50; 8.6 TABLET ORAL at 08:45

## 2025-06-12 RX ADMIN — DAKIN'S SOLUTION 0.125% (QUARTER STRENGTH): 0.12 SOLUTION at 16:47

## 2025-06-12 RX ADMIN — POLYETHYLENE GLYCOL 3350 17 G: 17 POWDER, FOR SOLUTION ORAL at 08:47

## 2025-06-12 RX ADMIN — HYDROCODONE BITARTRATE AND ACETAMINOPHEN 2 TABLET: 5; 325 TABLET ORAL at 02:28

## 2025-06-12 RX ADMIN — ISOSORBIDE DINITRATE 20 MG: 10 TABLET ORAL at 08:45

## 2025-06-12 RX ADMIN — MIDODRINE HYDROCHLORIDE 5 MG: 5 TABLET ORAL at 08:45

## 2025-06-12 RX ADMIN — PREGABALIN 50 MG: 50 CAPSULE ORAL at 08:46

## 2025-06-12 ASSESSMENT — PAIN DESCRIPTION - ORIENTATION
ORIENTATION: OTHER (COMMENT)
ORIENTATION: LEFT
ORIENTATION: RIGHT;LEFT;LOWER;UPPER;MID
ORIENTATION: OTHER (COMMENT)

## 2025-06-12 ASSESSMENT — PAIN DESCRIPTION - LOCATION
LOCATION: FOOT
LOCATION: HEAD
LOCATION: GENERALIZED;OTHER (COMMENT)
LOCATION: HEAD

## 2025-06-12 ASSESSMENT — PAIN SCALES - GENERAL
PAINLEVEL_OUTOF10: 7
PAINLEVEL_OUTOF10: 8
PAINLEVEL_OUTOF10: 3
PAINLEVEL_OUTOF10: 4
PAINLEVEL_OUTOF10: 7
PAINLEVEL_OUTOF10: 8
PAINLEVEL_OUTOF10: 4
PAINLEVEL_OUTOF10: 0

## 2025-06-12 ASSESSMENT — PAIN - FUNCTIONAL ASSESSMENT
PAIN_FUNCTIONAL_ASSESSMENT: PREVENTS OR INTERFERES WITH MANY ACTIVE NOT PASSIVE ACTIVITIES
PAIN_FUNCTIONAL_ASSESSMENT: PREVENTS OR INTERFERES SOME ACTIVE ACTIVITIES AND ADLS
PAIN_FUNCTIONAL_ASSESSMENT: PREVENTS OR INTERFERES WITH MANY ACTIVE NOT PASSIVE ACTIVITIES
PAIN_FUNCTIONAL_ASSESSMENT: PREVENTS OR INTERFERES WITH ALL ACTIVE AND SOME PASSIVE ACTIVITIES

## 2025-06-12 ASSESSMENT — PAIN DESCRIPTION - DESCRIPTORS
DESCRIPTORS: ACHING;STABBING
DESCRIPTORS: ACHING;THROBBING
DESCRIPTORS: BURNING;TINGLING
DESCRIPTORS: THROBBING;ACHING

## 2025-06-12 ASSESSMENT — PAIN SCALES - WONG BAKER: WONGBAKER_NUMERICALRESPONSE: NO HURT

## 2025-06-12 NOTE — PROGRESS NOTES
[facility]    Progress Note    Patient:  Cholo Bhakta  YOB: 1955  Date of Service: 6/12/2025  MRN: [id number]   Acct: [id number]   Primary Care Physician: Nirmal Nuno MD  Advance Directive: Full Code  Admit Date: 6/2/2025       Hospital Day: 10    Portions of this note have been copied forward, however, updated to reflect the most current clinical status of this patient.     CHIEF COMPLAINT: Fall    SUBJECTIVE: Reports feeling better today, pending dialysis this afternoon    CUMULATIVE HOSPITAL COURSE:     This patient is a 69-year-old male with a past medical history of hypertension, hyperlipidemia, type 2 diabetes mellitus, CKD, chronic atrial fibrillation on anticoagulation, chronic systolic heart failure, CAD s/p CABG who presented to [facility] ED on 6/2/2025 after sustaining a fall.  He also has a sacral pressure wound and left diabetic ulcer noted that he is seeing podiatry for.  ED workup revealed--CT head with no acute intracranial abnormality, CT cervical/lumbar spine with no acute osseous abnormality, CT pelvis with no acute findings, chest x-ray with no acute findings.  Creatinine 5.3, .  Patient was admitted to hospitalist service with consultation to nephrology and podiatry.  Patient was agreeable to dialysis.  He underwent permacath placement on 6/4.  He has been tolerating dialysis well.  Podiatry evaluated patient and felt there were no signs of acute infection, plan to follow-up with Dr. Heaton outpatient.  Podiatry was reconsulted after removing wound VAC with noted maceration to jeevan wound skin moderate odor to right plantar foot wound.  CT foot revealing cellulitis, without definite drainable abscess, chronic appearing erosions without definite acute osteomyelitis.  Patient does have known chronic ulcerations.  Podiatry did re-evaluate 6/9 and recommended further treatment to be determined outpatient with patient's primary podiatrist.  Patient has had no leukocytosis  demonstrates normal alignment.  Vertebral body height is maintained.  Disc space height is maintained.  Mild multilevel endplate and facet degenerative change. Bilateral pleural effusions within the field of view.  No fracture seen on the axial or reformatted images.      Impression:  No acute findings  All CT scans are performed using dose optimization techniques as appropriate to the performed exam and include at least one of the following: Automated exposure control, adjustment of the mA and/or kV according to size, and the use of iterative reconstruction technique.  ______________________________________ Electronically signed by: MICHAEL SMITH M.D. Date:     06/02/2025 Time:    05:23     CT HEAD WO CONTRAST  Result Date: 6/2/2025  EXAM: CT BRAIN WITHOUT CONTRAST    HISTORY:  Fall with altered mental status    TECHNIQUE:  CT of the brain without intravenous contrast.  FINDINGS:  There is no acute hemorrhage midline shift or mass effect.  No hydrocephalus or abnormal extra-axial fluid collection.  Generalized involutional atrophy, moderate.  Chronic microvascular change of the white matter tracts, mild.  The bony cranium appears normal. The visualized paranasal sinuses are clear. Soft tissues without significant abnormality.      1.  No acute intracranial abnormality.  All CT scans are performed using dose optimization techniques as appropriate to the performed exam and include at least one of the following: Automated exposure control, adjustment of the mA and/or kV according to size, and the use of iterative reconstruction technique.  ______________________________________ Electronically signed by: MICHAEL SMITH M.D. Date:     06/02/2025 Time:    05:21     CT CERVICAL SPINE WO CONTRAST  Result Date: 6/2/2025  CT CERVICAL SPINE WITHOUT CONTRAST  HISTORY:  Status post fall  TECHNIQUE:  CT of the cervical spine with multiplanar reformations.  FINDINGS:  Reformatted images demonstrate normal alignment

## 2025-06-12 NOTE — PROGRESS NOTES
Patient in dialysis, says he has a bad headache similar to what he had on Tuesday prior to seizure like activity. Rates it 8/10 in the front of his head. VSS. Gave Fioricet. Let patient know to notify dialysis nurse know if headache worsens/ of any changes. Dr. Steve notified.  notified.

## 2025-06-12 NOTE — PROGRESS NOTES
Rectal Q6H PRN Aj Lyons MD        glucose chewable tablet 16 g  4 tablet Oral PRN Aj Lyons MD        dextrose bolus 10% 125 mL  125 mL IntraVENous PRN Aj Lyons MD        Or    dextrose bolus 10% 250 mL  250 mL IntraVENous PRN Aj Lyons MD        glucagon injection 1 mg  1 mg SubCUTAneous PRN Aj Lyons MD        dextrose 10 % infusion   IntraVENous Continuous PRN Aj Lyons MD        insulin lispro (HUMALOG,ADMELOG) injection vial 0-4 Units  0-4 Units SubCUTAneous 4x Daily AC & HS Aj Lyons MD   1 Units at 06/10/25 1706    insulin glargine (LANTUS) injection vial 10 Units  10 Units SubCUTAneous BID Aj Lyons MD   10 Units at 06/12/25 0847       Past Medical History:  Past Medical History:   Diagnosis Date    CHF (congestive heart failure) (HCC)     Chronic kidney disease     Diabetes mellitus (HCC)     Hyperlipidemia     Hypertension     Palliative care patient 03/07/2025       Past Surgical History:  Past Surgical History:   Procedure Laterality Date    CORONARY ARTERY BYPASS GRAFT      VASCULAR SURGERY N/A 6/4/2025    PERMCATH INSERTION performed by Aj Lyons MD at [facility] OR       Family History  History reviewed. No pertinent family history.    Social History  Social History     Socioeconomic History    Marital status:      Spouse name: Not on file    Number of children: Not on file    Years of education: Not on file    Highest education level: Not on file   Occupational History    Not on file   Tobacco Use    Smoking status: Never    Smokeless tobacco: Never   Substance and Sexual Activity    Alcohol use: Not Currently    Drug use: Never    Sexual activity: Not on file   Other Topics Concern    Not on file   Social History Narrative    Not on file     Social Drivers of Health     Financial Resource Strain: Low Risk  (4/2/2025)    Overall Financial Resource Strain (CARDIA)     Difficulty of Paying Living Expenses: Not hard at all   Food  axial or reformatted images. No acute surrounding soft tissue abnormalitites. Lung apices are clear.      No acute findings in the cervical spine.  All CT scans are performed using dose optimization techniques as appropriate to the performed exam and include at least one of the following: Automated exposure control, adjustment of the mA and/or kV according to size, and the use of iterative reconstruction technique.  ______________________________________ Electronically signed by: MICHAEL SMITH M.D. Date:     06/02/2025 Time:    05:19        Assessment   End-stage renal disease  Diabetes type 2 with diabetic nephropathy  Peripheral vascular disease  Anemia in chronic kidney disease  Secondary hyperparathyroidism  Diabetic foot ulcer  Hyponatremia      Plan:  Discussed with patient, nursing  Workup reviewed today  Monitor labs  Dialysis currently Tuesday Thursday Saturday per routine scheduling  Cardiology evaluation reviewed as current (6/6)  Disposition ongoing    Norbert Hatfield MD  06/12/25  10:41 AM

## 2025-06-12 NOTE — CARE COORDINATION
Spoke with Nina at [facility], pt had originally been placed at the [facility] that was closest to his address, however pt will be going to [place] pending precert.  Nina is going to switch clinics to [facility] which is closer to [place].  Nina will advise when they have a chair time set up for [facility].  Electronically signed by Evie Carroll RN on 6/12/2025 at 9:29 AM

## 2025-06-12 NOTE — PLAN OF CARE
Problem: Nutrition Deficit:  Goal: Optimize nutritional status  6/11/2025 2331 by Azul Jin RN  Outcome: Progressing  6/11/2025 1557 by Kun Gale LPN  Outcome: Progressing     Problem: Chronic Conditions and Co-morbidities  Goal: Patient's chronic conditions and co-morbidity symptoms are monitored and maintained or improved  6/11/2025 2331 by Azul Jin, RN  Outcome: Progressing  6/11/2025 1557 by Kun Gale LPN  Outcome: Progressing     Problem: Discharge Planning  Goal: Discharge to home or other facility with appropriate resources  6/11/2025 2331 by Azul Jin, RN  Outcome: Progressing  6/11/2025 1557 by Kun Gale LPN  Outcome: Progressing     Problem: Safety - Adult  Goal: Free from fall injury  6/11/2025 2331 by Azul Jin, RN  Outcome: Progressing  6/11/2025 1557 by Kun Gale LPN  Outcome: Progressing     Problem: Skin/Tissue Integrity  Goal: Skin integrity remains intact  Description: 1.  Monitor for areas of redness and/or skin breakdown2.  Assess vascular access sites hourly3.  Every 4-6 hours minimum:  Change oxygen saturation probe site4.  Every 4-6 hours:  If on nasal continuous positive airway pressure, respiratory therapy assess nares and determine need for appliance change or resting period  6/11/2025 2331 by Azul Jin, RN  Outcome: Progressing  6/11/2025 1557 by Kun Gale LPN  Outcome: Progressing     Problem: Pain  Goal: Verbalizes/displays adequate comfort level or baseline comfort level  6/11/2025 2331 by Azul Jin, RN  Outcome: Progressing  6/11/2025 1557 by Kun Gale LPN  Outcome: Progressing     Problem: Neurosensory - Adult  Goal: Achieves stable or improved neurological status  6/11/2025 2331 by Azul Jin RN  Outcome: Progressing  6/11/2025 1557 by Kun Gale LPN  Outcome: Progressing     Problem: Respiratory - Adult  Goal: Achieves optimal ventilation and oxygenation  6/11/2025 2331 by Azul Jin, PHILOMENA  Outcome:

## 2025-06-12 NOTE — DIALYSIS
[facility] INPATIENT SERVICES  DIALYSIS TREATMENT SUMMARY      Note: Consult with the attending physician for patient treatment orders, this document is not a physician order.      Patient Information   Patient Cholo Bhakta   Date of Birth June 18, 1955   Chart Number [id number]   Location [facility] MRN [id number]   Gender Male   SSN (last 4) [id number]     Treatment Information   Treatment Type Hemodialysis   Treatment Id [id number]   Start Time June 12, 2025 11:21   End Time June 12, 2025 14:52   Acutal Duration 03:31     Treatment Balances   Total Saline Administered 500   Net Fluid Balance -2000    Hemodialysis Orders   Therapy Standard   Orders Verified Time 06/12/2025 07:00    Date Verified 06/12/2025   Duration 3:30   Isolated UF/Bypass No   BFR (mL) 450   DFR (mL) 700   Dialyzer Type OPTIFLUX 160NR   UF Order UF Range   UF Range (mL) 1500 - 2500   With BP Parameters Yes   As Tolerated Yes   Crit-Line used No   Heparin Initial Units Bolus No   Heparin IV Maintenance Bolus No   Heparin IV Infusion No   Potassium (mEq/L) 2.0   Calcium (mEq/L) 2.5   Bicarb (mg/dL) 35   Sodium (mEq/L) 140   Clinician Adrienne Velasco, PHILOMENA    Dialysis Access   Access Type Central Venous Catheter   Central Venous Catheter   Access Type Catheter - Tunneled   Access Location Chest Wall - R   Current/New [facility] Patient Yes   1st Use Catheter Verified by Previous Use   Catheter Care Completed per Policy Yes   Dressing Dry and Intact on Arrival Yes   Dressing Changed No   Type of Dressing Film Biopatch/CHG   Last Date Changed 06/10/2025   If No select Reason Dressing Change Not Indicated per Policy   Type of HD Caps Curos   HD Caps Changed Yes   CVC Line Education Provided Yes - Dressing Change Frequency      Vitals   Pre-Treatment Vitals   Time Is BP being recorded? Pre BP Map BP Method Pulse RR Temp How was Weight Obtained? Pre Weight Previous Dry Weight Previous Post Weight Metric Target Fluid Removal (mL)

## 2025-06-12 NOTE — PROCEDURES
[address] [address]                       ELECTROENCEPHALOGRAM REPORT      PATIENT NAME: CHELSIE ORTIZ                  : 1955  MED REC NO: [id number]                          ROOM: 0308  ACCOUNT NO: [id number]                       ADMIT DATE: 2025  PROVIDER: Scarlett Faith MD      DATE OF SERVICE:  2025    INDICATION FOR TEST:  Possible seizure.    DESCRIPTION:  The waking background consists of a rhythmic and symmetric well-formed and well-regulated posterior dominant 8-9 Hz activity.  During drowsiness, background frequency decreased by 1-2 Hz.  Stage 2 sleep is not seen.  Hyperventilation was not performed.  Photic stimulation produced no abnormalities.  No epileptiform discharges nor any focal lateralizing slowing was noted.    IMPRESSION:  Normal awake and drowsy electroencephalogram.  Correlate clinically.          SCARLETT FAITH MD      D:  2025 09:09:44     T:  2025 09:15:16     ADRIAN/SHANNA  Job #:  [id number]     Doc#:  [id number]

## 2025-06-12 NOTE — PROGRESS NOTES
Patient:   Cholo Bhakta  MR#:    [id number]   Room:    [facility]-   YOB: 1955  Date of Progress Note: 6/12/2025  Time of Note                           8:10 AM  Consulting Physician:   Sajan Faith M.D.  Attending Physician:  Sarbjit Steve MD       CHIEF COMPLAINT: Seizure-like activity  Subjective:  This 69 y.o. male who had some seizure-like activity 6/10.  Patient was sitting on the side of his bed complaining of a bad headache became diaphoretic with nausea and vomiting.  Then had a less than 1 minute episode of eyes rolling back and arms out to the side shaking followed by a collapse into the bed.  Unclear if there was a postictal state as the reports are conflicting.  No prior history of seizure.  CT of the head and routine blood studies relatively unremarkable.  He is admitted with chronic kidney disease with recent progression to dialysis.  Has CHF, CAD, hypertension, hyperlipidemia and diabetes along with chronic A-fib who is currently anticoagulated.  He has been receiving IV Levaquin.  Also reportedly takes Aricept for memory loss.  No further seizure-like episodes.  No complaints this morning  REVIEW OF SYSTEMS:  Constitutional: No fevers No chills  Neck:No stiffness  Respiratory: No shortness of breath  Cardiovascular: No chest pain No palpitations  Gastrointestinal: No abdominal pain    Genitourinary: No Dysuria  Neurological: No headache, no confusion      PHYSICAL EXAM:  BP (!) 101/54   Pulse 57   Temp 98.6 °F (37 °C) (Temporal)   Resp 18   Ht 1.829 m (6' 0.01\")   Wt 123.7 kg (272 lb 11.2 oz)   SpO2 99%   BMI 36.98 kg/m²     Constitutional: he appears well-developed and well-nourished.   Eyes - conjunctiva normal.  Pupils react to light  Ear, nose, throat -hearing intact to voice. No scars, masses, or lesions over external nose or ears, no atrophy of tongue  Neck-symmetric, no masses noted, no jugular vein distension  Respiration- chest wall appears symmetric, good  expansion,   normal effort without use of accessory muscles  Cardiovascular- RRR  Musculoskeletal - no significant wasting of muscles noted, no bony deformities, gait no gross ataxia  Extremities-no clubbing, cyanosis or edema  Skin - warm, dry, and intact.  No rash, erythema, or pallor.  Psychiatric - mood, affect, and behavior appear normal.      Neurology  NEUROLOGICAL EXAM:      Mental status   Awake, alert, fluent oriented x 3 appropriate affect  Attention and concentration appear appropriate  Recent and remote memory appears unremarkable  Speech normal without dysarthria  No clear issues with language       Cranial Nerves   CN II- Visual fields grossly unremarkable  CN III, IV,VI-EOMI, No nystagmus, conjugate eye movements, no ptosis  CN VII-no facial asymmetry  CN VIII-Hearing intact    Motor function  Antigravity x 4         Nursing/pcp notes, imaging,labs and vitals reviewed.         Lab Results   Component Value Date    WBC 7.2 06/12/2025    HGB 12.0 (L) 06/12/2025    HCT 37.9 (L) 06/12/2025    MCV 90.9 06/12/2025     06/12/2025     Lab Results   Component Value Date     06/12/2025    K 4.2 06/12/2025    CL 96 (L) 06/12/2025    CO2 25 06/12/2025    BUN 45 (H) 06/12/2025    CREATININE 6.3 (H) 06/12/2025    GLUCOSE 124 (H) 06/12/2025    CALCIUM 9.3 06/12/2025    BILITOT 0.6 06/12/2025    ALKPHOS 123 06/12/2025    AST 32 06/12/2025    ALT 9 (L) 06/12/2025    LABGLOM 9 (A) 06/12/2025    GFRAA 30 (L) 01/03/2022     Lab Results   Component Value Date    INR 1.41 (H) 03/12/2025   No results found for: \"PHENYTOIN\", \"ESR\", \"CRP\"    PT,OT and/or speech notes reviewed:      RECORD REVIEW: Previous medical records, medications were reviewed at today's visit    IMPRESSION: Seizure-like activity in a patient with multiple medical comorbidities.  Unclear if this was an epileptic event or not.  EEG unremarkable.  Withhold AED treatment for now.  Avoid Levaquin if able.  Will follow peripherally.

## 2025-06-12 NOTE — PROGRESS NOTES
06/12/25 1515   Subjective   Subjective Patient in bed agrees to therapy.  C/O HA does  not rate.   Cognition   Overall Cognitive Status WFL   Orientation   Overall Orientation Status WNL   Vitals   Temp 97.3 °F (36.3 °C)   Pulse 58   Respirations 16   SpO2 100 %   /73   MAP (Calculated) 88   Bed Mobility Training   Bed Mobility Training Yes   Supine to Sit Stand by assistance   Sit to Supine Stand by assistance   Balance   Sitting Intact  (Sat EOB 10-15 minutes SBA.)   Standing With support  (Stood BS with RW.)   Transfer Training   Transfer Training Yes   Interventions Demonstration;Manual cues;Tactile cues;Verbal cues   Sit to Stand Minimal assistance  (With cues patient could maintain L LE NWB.  Patient stood BS with SW about 30 seconds.  Could not pivot \" heel to toe \" method on R LE.)   Stand to Sit Minimal assistance   PT Exercises   A/AROM Exercises BL LE EX sitting EOB X 10-15 reps.   Patient Education   Education Given To Patient   Education Provided Role of Therapy;Plan of Care;Home Exercise Program;Precautions;Transfer Training;Mobility Training;Fall Prevention Strategies   Education Provided Comments Use of call light, and  staff assist.   Education Method Demonstration;Verbal   Barriers to Learning Cognition   Education Outcome Verbalized understanding;Demonstrated understanding;Continued education needed   Other Specialty Interventions   Other Treatments/Modalities BTB call light all needs in reach.  Bed alarm set.   Assessment   Activity Tolerance Patient tolerated treatment well     Electronically signed by Maycol Garcia PTA on 6/12/2025 at 4:03 PM

## 2025-06-12 NOTE — PROGRESS NOTES
Occupational Therapy Daily Treatment Note  Date: 2025   Patient Name: Cholo Bhakta  MRN: [id number]     : 1955    Date of Service: 2025    Discharge Recommendations:  24 hour supervision or assist, Patient would benefit from continued therapy after discharge       Assessment   Assessment: Tx completed. Pt was willing to work with therapy, despite just returning from diaylsis. Pt required SBA for bed mobilty and was able to sit EOB without balance concerns. Pt reported minimal dizziness that resolved quickly. Pt was able to perform B UE AROM exercises including shoulder elevation/depression, shoulder flexion/extension (0-90), scapular protraction/retraction performing 1 set of 10 reps each. Pt was able to stand at EOB with CGA/Min A using RW. Pt was able to stand and maintain NWB status of L LE with CGA using RW. Pt requested to sit due to pain and fatigue. Pt continues to benefit from skilled OT services.  Treatment Diagnosis: SANGEETA, chronic diabetic ulcer of L foot  Activity Tolerance  Activity Tolerance: Patient Tolerated treatment well              Patient Diagnosis(es): The primary encounter diagnosis was Fall, initial encounter. Diagnoses of Open wound of left foot, initial encounter, Acute kidney injury superimposed on CKD, Altered mental status, unspecified altered mental status type, Pressure injury of skin of sacral region, unspecified injury stage, and Hypoglycemia were also pertinent to this visit.    Past Medical History:   Past Medical History:   Diagnosis Date    CHF (congestive heart failure) (HCC)     Chronic kidney disease     Diabetes mellitus (HCC)     Hyperlipidemia     Hypertension     Palliative care patient 2025        Past Surgical History:   Past Surgical History:   Procedure Laterality Date    CORONARY ARTERY BYPASS GRAFT      VASCULAR SURGERY N/A 2025    PERMCATH INSERTION performed by Aj Lyons MD at [facility] OR       Treatment Diagnosis: SANGEETA, chronic diabetic

## 2025-06-13 LAB
ALBUMIN SERPL-MCNC: 3.9 G/DL (ref 3.5–5.2)
ALP SERPL-CCNC: 124 U/L (ref 40–129)
ALT SERPL-CCNC: 16 U/L (ref 10–50)
ANION GAP SERPL CALCULATED.3IONS-SCNC: 15 MMOL/L (ref 8–16)
ANISOCYTOSIS BLD QL SMEAR: ABNORMAL
AST SERPL-CCNC: 35 U/L (ref 10–50)
BASOPHILS # BLD: 0.1 K/UL (ref 0–0.2)
BASOPHILS NFR BLD: 1 % (ref 0–1)
BILIRUB SERPL-MCNC: 0.5 MG/DL (ref 0.2–1.2)
BUN SERPL-MCNC: 28 MG/DL (ref 8–23)
CALCIUM SERPL-MCNC: 9.1 MG/DL (ref 8.8–10.2)
CHLORIDE SERPL-SCNC: 96 MMOL/L (ref 98–107)
CO2 SERPL-SCNC: 26 MMOL/L (ref 22–29)
CREAT SERPL-MCNC: 5.1 MG/DL (ref 0.7–1.2)
EOSINOPHIL # BLD: 0.4 K/UL (ref 0–0.6)
EOSINOPHIL NFR BLD: 4.5 % (ref 0–5)
ERYTHROCYTE [DISTWIDTH] IN BLOOD BY AUTOMATED COUNT: 15.2 % (ref 11.5–14.5)
GLUCOSE BLD-MCNC: 135 MG/DL (ref 70–99)
GLUCOSE BLD-MCNC: 163 MG/DL (ref 70–99)
GLUCOSE BLD-MCNC: 181 MG/DL (ref 70–99)
GLUCOSE BLD-MCNC: 184 MG/DL (ref 70–99)
GLUCOSE SERPL-MCNC: 121 MG/DL (ref 70–99)
HCT VFR BLD AUTO: 38.3 % (ref 42–52)
HGB BLD-MCNC: 12 G/DL (ref 14–18)
HYPOCHROMIA BLD QL SMEAR: ABNORMAL
IMM GRANULOCYTES # BLD: 0.1 K/UL
LYMPHOCYTES # BLD: 1.1 K/UL (ref 1.1–4.5)
LYMPHOCYTES NFR BLD: 14.1 % (ref 20–40)
MCH RBC QN AUTO: 29.2 PG (ref 27–31)
MCHC RBC AUTO-ENTMCNC: 31.3 G/DL (ref 33–37)
MCV RBC AUTO: 93.2 FL (ref 80–94)
MONOCYTES # BLD: 1.3 K/UL (ref 0–0.9)
MONOCYTES NFR BLD: 17.1 % (ref 0–10)
NEUTROPHILS # BLD: 4.9 K/UL (ref 1.5–7.5)
NEUTS SEG NFR BLD: 62.3 % (ref 50–65)
PERFORMED ON: ABNORMAL
PLATELET # BLD AUTO: 155 K/UL (ref 130–400)
PLATELET SLIDE REVIEW: ADEQUATE
PMV BLD AUTO: 12.5 FL (ref 9.4–12.4)
POTASSIUM SERPL-SCNC: 3.8 MMOL/L (ref 3.5–5)
PROT SERPL-MCNC: 7.4 G/DL (ref 6.4–8.3)
RBC # BLD AUTO: 4.11 M/UL (ref 4.7–6.1)
REASON FOR REJECTION: NORMAL
REJECTED TEST: NORMAL
SODIUM SERPL-SCNC: 137 MMOL/L (ref 136–145)
WBC # BLD AUTO: 7.8 K/UL (ref 4.8–10.8)

## 2025-06-13 PROCEDURE — 97535 SELF CARE MNGMENT TRAINING: CPT

## 2025-06-13 PROCEDURE — 85025 COMPLETE CBC W/AUTO DIFF WBC: CPT

## 2025-06-13 PROCEDURE — 6370000000 HC RX 637 (ALT 250 FOR IP): Performed by: SURGERY

## 2025-06-13 PROCEDURE — 82962 GLUCOSE BLOOD TEST: CPT

## 2025-06-13 PROCEDURE — 99232 SBSQ HOSP IP/OBS MODERATE 35: CPT | Performed by: PSYCHIATRY & NEUROLOGY

## 2025-06-13 PROCEDURE — 36415 COLL VENOUS BLD VENIPUNCTURE: CPT

## 2025-06-13 PROCEDURE — 6370000000 HC RX 637 (ALT 250 FOR IP): Performed by: INTERNAL MEDICINE

## 2025-06-13 PROCEDURE — 97530 THERAPEUTIC ACTIVITIES: CPT

## 2025-06-13 PROCEDURE — 6370000000 HC RX 637 (ALT 250 FOR IP)

## 2025-06-13 PROCEDURE — 94760 N-INVAS EAR/PLS OXIMETRY 1: CPT

## 2025-06-13 PROCEDURE — 51798 US URINE CAPACITY MEASURE: CPT

## 2025-06-13 PROCEDURE — 80053 COMPREHEN METABOLIC PANEL: CPT

## 2025-06-13 PROCEDURE — 6370000000 HC RX 637 (ALT 250 FOR IP): Performed by: STUDENT IN AN ORGANIZED HEALTH CARE EDUCATION/TRAINING PROGRAM

## 2025-06-13 PROCEDURE — 6370000000 HC RX 637 (ALT 250 FOR IP): Performed by: PSYCHIATRY & NEUROLOGY

## 2025-06-13 PROCEDURE — 6360000002 HC RX W HCPCS

## 2025-06-13 PROCEDURE — 2500000003 HC RX 250 WO HCPCS: Performed by: ANESTHESIOLOGY

## 2025-06-13 PROCEDURE — 1200000000 HC SEMI PRIVATE

## 2025-06-13 RX ORDER — NORTRIPTYLINE HYDROCHLORIDE 10 MG/1
10 CAPSULE ORAL NIGHTLY
Status: DISCONTINUED | OUTPATIENT
Start: 2025-06-13 | End: 2025-06-14 | Stop reason: HOSPADM

## 2025-06-13 RX ADMIN — CLINDAMYCIN PHOSPHATE 900 MG: 900 INJECTION, SOLUTION INTRAVENOUS at 00:32

## 2025-06-13 RX ADMIN — METOPROLOL SUCCINATE 25 MG: 25 TABLET, EXTENDED RELEASE ORAL at 08:39

## 2025-06-13 RX ADMIN — BUTALBITAL, ACETAMINOPHEN, AND CAFFEINE 1 TABLET: 325; 50; 40 TABLET ORAL at 12:22

## 2025-06-13 RX ADMIN — INSULIN GLARGINE 10 UNITS: 100 INJECTION, SOLUTION SUBCUTANEOUS at 21:48

## 2025-06-13 RX ADMIN — FERROUS SULFATE TAB 325 MG (65 MG ELEMENTAL FE) 325 MG: 325 (65 FE) TAB at 08:39

## 2025-06-13 RX ADMIN — PREGABALIN 50 MG: 50 CAPSULE ORAL at 08:38

## 2025-06-13 RX ADMIN — FERROUS SULFATE TAB 325 MG (65 MG ELEMENTAL FE) 325 MG: 325 (65 FE) TAB at 21:45

## 2025-06-13 RX ADMIN — SENNOSIDES AND DOCUSATE SODIUM 2 TABLET: 50; 8.6 TABLET ORAL at 08:39

## 2025-06-13 RX ADMIN — INSULIN LISPRO 1 UNITS: 100 INJECTION, SOLUTION INTRAVENOUS; SUBCUTANEOUS at 17:20

## 2025-06-13 RX ADMIN — FAMOTIDINE 20 MG: 20 TABLET, FILM COATED ORAL at 08:39

## 2025-06-13 RX ADMIN — PANTOPRAZOLE SODIUM 40 MG: 40 TABLET, DELAYED RELEASE ORAL at 08:39

## 2025-06-13 RX ADMIN — LACTULOSE 20 G: 20 SOLUTION ORAL at 08:40

## 2025-06-13 RX ADMIN — DONEPEZIL HYDROCHLORIDE 10 MG: 10 TABLET, FILM COATED ORAL at 08:39

## 2025-06-13 RX ADMIN — POLYETHYLENE GLYCOL 3350 17 G: 17 POWDER, FOR SOLUTION ORAL at 08:39

## 2025-06-13 RX ADMIN — ISOSORBIDE DINITRATE 20 MG: 10 TABLET ORAL at 18:12

## 2025-06-13 RX ADMIN — BUTALBITAL, ACETAMINOPHEN, AND CAFFEINE 1 TABLET: 325; 50; 40 TABLET ORAL at 00:22

## 2025-06-13 RX ADMIN — CALCITRIOL CAPSULES 0.25 MCG 0.25 MCG: 0.25 CAPSULE ORAL at 08:39

## 2025-06-13 RX ADMIN — SODIUM CHLORIDE, PRESERVATIVE FREE 10 ML: 5 INJECTION INTRAVENOUS at 11:01

## 2025-06-13 RX ADMIN — Medication 10 MG: at 21:45

## 2025-06-13 RX ADMIN — MICONAZOLE NITRATE: 2 POWDER TOPICAL at 08:39

## 2025-06-13 RX ADMIN — HYDROCODONE BITARTRATE AND ACETAMINOPHEN 2 TABLET: 5; 325 TABLET ORAL at 20:38

## 2025-06-13 RX ADMIN — LACTULOSE 20 G: 20 SOLUTION ORAL at 14:18

## 2025-06-13 RX ADMIN — LACTULOSE 20 G: 20 SOLUTION ORAL at 21:45

## 2025-06-13 RX ADMIN — MIDODRINE HYDROCHLORIDE 5 MG: 5 TABLET ORAL at 17:19

## 2025-06-13 RX ADMIN — SODIUM CHLORIDE, PRESERVATIVE FREE 10 ML: 5 INJECTION INTRAVENOUS at 21:47

## 2025-06-13 RX ADMIN — HYDROCODONE BITARTRATE AND ACETAMINOPHEN 2 TABLET: 5; 325 TABLET ORAL at 00:21

## 2025-06-13 RX ADMIN — INSULIN LISPRO 7 UNITS: 100 INJECTION, SOLUTION INTRAVENOUS; SUBCUTANEOUS at 17:19

## 2025-06-13 RX ADMIN — DAKIN'S SOLUTION 0.125% (QUARTER STRENGTH): 0.12 SOLUTION at 21:51

## 2025-06-13 RX ADMIN — HYDROCODONE BITARTRATE AND ACETAMINOPHEN 2 TABLET: 5; 325 TABLET ORAL at 15:43

## 2025-06-13 RX ADMIN — DAKIN'S SOLUTION 0.125% (QUARTER STRENGTH): 0.12 SOLUTION at 11:01

## 2025-06-13 RX ADMIN — NORTRIPTYLINE HYDROCHLORIDE 10 MG: 10 CAPSULE ORAL at 21:45

## 2025-06-13 RX ADMIN — ESCITALOPRAM OXALATE 10 MG: 10 TABLET ORAL at 08:39

## 2025-06-13 RX ADMIN — SENNOSIDES AND DOCUSATE SODIUM 2 TABLET: 50; 8.6 TABLET ORAL at 21:46

## 2025-06-13 RX ADMIN — MIDODRINE HYDROCHLORIDE 5 MG: 5 TABLET ORAL at 12:20

## 2025-06-13 RX ADMIN — CLINDAMYCIN PHOSPHATE 900 MG: 900 INJECTION, SOLUTION INTRAVENOUS at 08:47

## 2025-06-13 RX ADMIN — MIDODRINE HYDROCHLORIDE 5 MG: 5 TABLET ORAL at 08:39

## 2025-06-13 RX ADMIN — INSULIN LISPRO 7 UNITS: 100 INJECTION, SOLUTION INTRAVENOUS; SUBCUTANEOUS at 12:20

## 2025-06-13 RX ADMIN — APIXABAN 5 MG: 5 TABLET, FILM COATED ORAL at 21:46

## 2025-06-13 RX ADMIN — ROSUVASTATIN 10 MG: 10 TABLET, FILM COATED ORAL at 21:45

## 2025-06-13 RX ADMIN — APIXABAN 5 MG: 5 TABLET, FILM COATED ORAL at 08:39

## 2025-06-13 RX ADMIN — HYDROCODONE BITARTRATE AND ACETAMINOPHEN 2 TABLET: 5; 325 TABLET ORAL at 08:38

## 2025-06-13 RX ADMIN — PREGABALIN 50 MG: 50 CAPSULE ORAL at 21:45

## 2025-06-13 RX ADMIN — INSULIN GLARGINE 10 UNITS: 100 INJECTION, SOLUTION SUBCUTANEOUS at 08:39

## 2025-06-13 RX ADMIN — MICONAZOLE NITRATE: 2 POWDER TOPICAL at 21:50

## 2025-06-13 RX ADMIN — INSULIN LISPRO 1 UNITS: 100 INJECTION, SOLUTION INTRAVENOUS; SUBCUTANEOUS at 21:47

## 2025-06-13 ASSESSMENT — PAIN SCALES - GENERAL
PAINLEVEL_OUTOF10: 7
PAINLEVEL_OUTOF10: 0
PAINLEVEL_OUTOF10: 7
PAINLEVEL_OUTOF10: 6
PAINLEVEL_OUTOF10: 7
PAINLEVEL_OUTOF10: 7
PAINLEVEL_OUTOF10: 0
PAINLEVEL_OUTOF10: 8

## 2025-06-13 ASSESSMENT — PAIN DESCRIPTION - LOCATION
LOCATION: FOOT
LOCATION: HEAD
LOCATION: FOOT
LOCATION: FOOT

## 2025-06-13 ASSESSMENT — PAIN DESCRIPTION - ORIENTATION
ORIENTATION: LEFT
ORIENTATION: LEFT
ORIENTATION: RIGHT;LEFT
ORIENTATION: ANTERIOR
ORIENTATION: LEFT

## 2025-06-13 ASSESSMENT — PAIN DESCRIPTION - DESCRIPTORS
DESCRIPTORS: THROBBING
DESCRIPTORS: ACHING
DESCRIPTORS: ACHING
DESCRIPTORS: THROBBING
DESCRIPTORS: ACHING;BURNING;THROBBING

## 2025-06-13 ASSESSMENT — PAIN - FUNCTIONAL ASSESSMENT
PAIN_FUNCTIONAL_ASSESSMENT: PREVENTS OR INTERFERES SOME ACTIVE ACTIVITIES AND ADLS
PAIN_FUNCTIONAL_ASSESSMENT: PREVENTS OR INTERFERES WITH MANY ACTIVE NOT PASSIVE ACTIVITIES
PAIN_FUNCTIONAL_ASSESSMENT: PREVENTS OR INTERFERES WITH MANY ACTIVE NOT PASSIVE ACTIVITIES
PAIN_FUNCTIONAL_ASSESSMENT: INTOLERABLE, UNABLE TO DO ANY ACTIVE OR PASSIVE ACTIVITIES
PAIN_FUNCTIONAL_ASSESSMENT: ACTIVITIES ARE NOT PREVENTED

## 2025-06-13 ASSESSMENT — PAIN SCALES - WONG BAKER: WONGBAKER_NUMERICALRESPONSE: NO HURT

## 2025-06-13 NOTE — PROGRESS NOTES
Occupational Therapy     06/13/25 1600   Subjective   Subjective Pt in bed upon arrival for therapy. Pt agreeable to participate.   Pain Assessment   Pain Assessment None - Denies Pain   Cognition   Overall Cognitive Status WFL   Orientation   Overall Orientation Status WFL   Bed Mobility Training   Bed Mobility Training Yes   Overall Level of Assistance Stand by assistance   Interventions Verbal cues   Transfer Training   Transfer Training Yes   Overall Level of Assistance Minimal assistance   Interventions Verbal cues;Tactile cues;Manual cues;Demonstration   Sit to Stand Minimal assistance   Stand to Sit Minimal assistance   Bed to Chair Minimal assistance   Balance   Sitting Intact   Standing With support  (RW (stand-pivot))   ADL   Feeding Independent   Grooming Independent   UE Bathing Setup;Stand by assistance   LE Bathing Moderate assistance   UE Dressing Setup;Stand by assistance   LE Dressing Moderate assistance   Putting On/Taking Off Footwear Minimal assistance;Moderate assistance   Toileting Moderate assistance;Maximum assistance   Toileting Skilled Clinical Factors for thorough clean up.   Functional Mobility Minimal assistance   Functional Mobility Skilled Clinical Factors RW; stand-pivot   Additional Comments Based on clinical observation.   Assessment   Assessment Tx focused on sitting EOB to change gown and don sock to RLE. Pt instructed on transfer training from bed to recliner at RW level with MIn assist.   Activity Tolerance Patient tolerated treatment well   Discharge Recommendations Patient would benefit from continued therapy after discharge   Occupational Therapy Plan   Times Per Week 3-5   Times Per Day Once a day   OT Plan of Care   Friday X     Electronically signed by DARCY Almeida on 6/13/2025 at 4:36 PM

## 2025-06-13 NOTE — CARE COORDINATION
06/13/25 1610   IMM Letter   IMM Letter given to Patient/Family/Significant other/Guardian/POA/by: letter verbally given; wife on phone at nurses' station; GORDY Allen   IMM Letter date given: 06/13/25   IMM Letter time given: 1610     SW placed letter into soft chart; copy left at bedside.  Electronically signed by KENDALL Currie on 6/13/2025 at 5:14 PM

## 2025-06-13 NOTE — PLAN OF CARE
Problem: Nutrition Deficit:  Goal: Optimize nutritional status  6/13/2025 0852 by Catarina Glover RN  Outcome: Progressing  6/13/2025 0128 by Paradise Nugent LPN  Outcome: Progressing     Problem: Chronic Conditions and Co-morbidities  Goal: Patient's chronic conditions and co-morbidity symptoms are monitored and maintained or improved  6/13/2025 0852 by Catarina Glover RN  Outcome: Progressing  6/13/2025 0128 by Paradise Nugent LPN  Outcome: Progressing  Flowsheets (Taken 6/12/2025 2234)  Care Plan - Patient's Chronic Conditions and Co-Morbidity Symptoms are Monitored and Maintained or Improved: Monitor and assess patient's chronic conditions and comorbid symptoms for stability, deterioration, or improvement     Problem: Discharge Planning  Goal: Discharge to home or other facility with appropriate resources  6/13/2025 0852 by Catarina Glover RN  Outcome: Progressing  6/13/2025 0128 by Paradise Nugent LPN  Outcome: Progressing  Flowsheets (Taken 6/12/2025 2234)  Discharge to home or other facility with appropriate resources: Identify barriers to discharge with patient and caregiver     Problem: Safety - Adult  Goal: Free from fall injury  6/13/2025 0852 by Catarina Glover RN  Outcome: Progressing  6/13/2025 0128 by Paradise Nugent LPN  Outcome: Progressing  Flowsheets (Taken 6/13/2025 0127)  Free From Fall Injury: Instruct family/caregiver on patient safety     Problem: Skin/Tissue Integrity  Goal: Skin integrity remains intact  Description: 1.  Monitor for areas of redness and/or skin breakdown2.  Assess vascular access sites hourly3.  Every 4-6 hours minimum:  Change oxygen saturation probe site4.  Every 4-6 hours:  If on nasal continuous positive airway pressure, respiratory therapy assess nares and determine need for appliance change or resting period  6/13/2025 0852 by Catarina Glover RN  Outcome: Progressing  6/13/2025 0128 by Paradise Nugent LPN  Outcome: Progressing  Flowsheets  (Taken 6/13/2025 0127)  Absence of Physical Injury: Implement safety measures based on patient assessment

## 2025-06-13 NOTE — CARE COORDINATION
[place] has precert  [facility]  [phone] p  [phone] f  Electronically signed by KENDALL Currie on 6/13/2025 at 2:50 PM      Still waiting on [facility] for final chair confirmation that will work with transportation at [facility] (chair available was TTS @ [facility] (this won't work b/c of [facility] transit situation-PATS stops running at 1 on Sat=-[facility] also have no [facility] chairs; awaiting final answer on a [place] chair)  [facility]  [phone]  [phone]  Electronically signed by KENDALL Currie on 6/13/2025 at 2:50 PM

## 2025-06-13 NOTE — PLAN OF CARE
Problem: Nutrition Deficit:  Goal: Optimize nutritional status  6/13/2025 0128 by Paradise Nugent LPN  Outcome: Progressing  6/12/2025 1543 by Emy Meade RN  Outcome: Progressing     Problem: Chronic Conditions and Co-morbidities  Goal: Patient's chronic conditions and co-morbidity symptoms are monitored and maintained or improved  6/13/2025 0128 by Paradise Nugent LPN  Outcome: Progressing  Flowsheets (Taken 6/12/2025 2234)  Care Plan - Patient's Chronic Conditions and Co-Morbidity Symptoms are Monitored and Maintained or Improved: Monitor and assess patient's chronic conditions and comorbid symptoms for stability, deterioration, or improvement  6/12/2025 1543 by Emy Meade RN  Outcome: Progressing  Flowsheets (Taken 6/12/2025 0856)  Care Plan - Patient's Chronic Conditions and Co-Morbidity Symptoms are Monitored and Maintained or Improved: Monitor and assess patient's chronic conditions and comorbid symptoms for stability, deterioration, or improvement     Problem: Discharge Planning  Goal: Discharge to home or other facility with appropriate resources  6/13/2025 0128 by Paradise Nugent LPN  Outcome: Progressing  Flowsheets (Taken 6/12/2025 2234)  Discharge to home or other facility with appropriate resources: Identify barriers to discharge with patient and caregiver  6/12/2025 1543 by Emy Meade RN  Outcome: Progressing  Flowsheets (Taken 6/12/2025 0856)  Discharge to home or other facility with appropriate resources:   Identify barriers to discharge with patient and caregiver   Arrange for needed discharge resources and transportation as appropriate   Identify discharge learning needs (meds, wound care, etc)     Problem: Safety - Adult  Goal: Free from fall injury  6/13/2025 0128 by Paradise Nugent LPN  Outcome: Progressing  Flowsheets (Taken 6/13/2025 0127)  Free From Fall Injury: Instruct family/caregiver on patient safety  6/12/2025 1543 by Emy Meade RN  Outcome: Progressing    Care or initiate Family Care Conference as is appropriate  6/13/2025 0128 by Paradise Nugent LPN  Outcome: Progressing  Flowsheets (Taken 6/12/2025 2234)  Patient/family able to effectively weigh alternatives and participate in decision making related to treatment and care: Determine when there are differences between patient's view, family's view, and healthcare provider's view of condition  6/12/2025 1543 by Emy Meade, RN  Outcome: Progressing  Flowsheets (Taken 6/12/2025 0856)  Patient/family able to effectively weigh alternatives and participate in decision making related to treatment and care:   Determine when there are differences between patient's view, family's view, and healthcare provider's view of condition   Facilitate patient and family articulation of goals for care     Problem: ABCDS Injury Assessment  Goal: Absence of physical injury  6/13/2025 0128 by Paradise Nugent LPN  Outcome: Progressing  Flowsheets (Taken 6/13/2025 0127)  Absence of Physical Injury: Implement safety measures based on patient assessment  6/12/2025 1543 by Emy Meade, RN  Outcome: Progressing

## 2025-06-13 NOTE — PROGRESS NOTES
Physical Therapy  Name: Cholo Bhakta  MRN:  [id number]  Date of service:  6/13/2025 06/13/25 1538   Restrictions/Precautions   Restrictions/Precautions Fall Risk;Weight Bearing   Lower Extremity Weight Bearing Restrictions   Right Lower Extremity Weight Bearing Non Weight Bearing   General   Chart Reviewed Yes   Family/Caregiver Present No   Referring Practitioner Luiz Muñoz MD   Subjective   Subjective pt agrees to therapy, pt in bed   General   General Comments Rn, okayed therapy   Pain Assessment   Pain Assessment None - Denies Pain   Pain Level 0   Oxygen Therapy   O2 Device None (Room air)   Orientation   Overall Orientation Status WFL   Bed Mobility   Supine to Sit Contact guard assistance   Transfers   Sit to Stand Minimal Assistance;2 Person Assistance   Stand to Sit Minimal Assistance;2 Person Assistance   Stand Pivot Transfers Partial/Moderate assistance;Minimal Assistance;2 Person Assistance   Lateral Transfers Partial/Moderate assistance;Minimal Assistance;2 Person Assistance   Comment pt needs cues for hand placement and compliance with nwb of le  pt able to shift, scoot, wiggle foot and move bed to chair  pt needing extra time and max cues to complete  (pt did place foot down and had to be corrected/reminded to not bear wt)   Ambulation   Comments unable at this time   Patient Goals    Patient Goals  go home   Short Term Goals   Time Frame for Short Term Goals 2 wks   Short Term Goal 1 supine to sit indep   Short Term Goal 2 sit to stand indep   Short Term Goal 3 bed to chair SBA RW   Conditions Requiring Skilled Therapeutic Intervention   Body Structures, Functions, Activity Limitations Requiring Skilled Therapeutic Intervention Decreased functional mobility ;Decreased ADL status;Decreased ROM;Decreased body mechanics;Decreased sensation;Decreased endurance;Decreased safe awareness;Decreased cognition;Decreased balance;Increased pain;Decreased posture   Assessment pt tolerating tx  Pt needing

## 2025-06-13 NOTE — PROGRESS NOTES
Physician Progress Note      PATIENT:               CHELSIE ORTIZ  CSN #:                  [id number]  :                       1955  ADMIT DATE:       2025 4:57 AM  DISCH DATE:  RESPONDING  PROVIDER #:        CAMILA WILKINSON          QUERY TEXT:    Based on your medical judgment, please clarify these findings and document if   any of the following are being evaluated and/or treated:    The clinical indicators include:  This is 69yr old male present with SANGEETA,    -DM, HTN, 69yr old, CAD    -Chronic atrial fibrillation on Eliquis noted in cardiology consult note & PN   on , by Vladimir Cohen MD    -ELIQUIS 5 MG TABS tablet(From Medication prior admission)    Thank you,  Sara Pantoja [facility] CDS.  Options provided:  -- Secondary hypercoagulable state in a patient with atrial fibrillation  -- Other - I will add my own diagnosis  -- Disagree - Not applicable / Not valid  -- Disagree - Clinically unable to determine / Unknown  -- Refer to Clinical Documentation Reviewer    PROVIDER RESPONSE TEXT:    This patient has secondary hypercoagulable state in a patient with atrial   fibrillation.    Query created by: Sara Shepherd on 2025 8:03 PM      Electronically signed by:  CAMILA WILKINSON 2025 12:04 PM

## 2025-06-13 NOTE — PROGRESS NOTES
[facility]    [facility] Note    Patient:  Cholo Bhakta  YOB: 1955  Date of Service: 6/13/2025  MRN: [id number]   Acct: [id number]   Primary Care Physician: Nirmal Nuno MD  Advance Directive: Full Code  Admit Date: 6/2/2025       Hospital Day: 11    Portions of this note have been copied forward, however, updated to reflect the most current clinical status of this patient.     CHIEF COMPLAINT: Fall    SUBJECTIVE: Reports feeling ok, complaining of chronic headache    CUMULATIVE HOSPITAL COURSE:     This patient is a 69-year-old male with a past medical history of hypertension, hyperlipidemia, type 2 diabetes mellitus, CKD, chronic atrial fibrillation on anticoagulation, chronic systolic heart failure, CAD s/p CABG who presented to [facility] ED on 6/2/2025 after sustaining a fall.  He also has a sacral pressure wound and left diabetic ulcer noted that he is seeing podiatry for.  ED workup revealed--CT head with no acute intracranial abnormality, CT cervical/lumbar spine with no acute osseous abnormality, CT pelvis with no acute findings, chest x-ray with no acute findings.  Creatinine 5.3, .  Patient was admitted to hospitalist service with consultation to nephrology and podiatry.  Patient was agreeable to dialysis.  He underwent permacath placement on 6/4.  He has been tolerating dialysis well.  Podiatry evaluated patient and felt there were no signs of acute infection, plan to follow-up with Dr. Heaton outpatient.  Podiatry was reconsulted after removing wound VAC with noted maceration to jeevan wound skin moderate odor to right plantar foot wound.  CT foot revealing cellulitis, without definite drainable abscess, chronic appearing erosions without definite acute osteomyelitis.  Patient does have known chronic ulcerations.  Podiatry did re-evaluate 6/9 and recommended further treatment to be determined outpatient with patient's primary podiatrist.  Patient has had no leukocytosis and has  proximal phalanx of the second toe.      ______________________________________ Electronically signed by: WENDIE PHILLIPS M.D. Date:     06/02/2025 Time:    06:25     XR ELBOW LEFT (2 VIEWS)  Result Date: 6/2/2025  EXAM:   XR LEFT ELBOW.  HISTORY:   Left elbow pain.  TECHNIQUE:   2 views.  Frontal and lateral.  COMPARISON:   No prior study is available for comparison.  FINDINGS: There is no fracture or dislocation. The soft tissues are normal. There is a large olecranon spur.       1.  No fracture or dislocation.    ______________________________________ Electronically signed by: CAROLINA CRAWFORD D.O. Date:     06/02/2025 Time:    06:24     XR CHEST PORTABLE  Result Date: 6/2/2025  EXAM:  CHEST ONE-VIEW  History:  Status post fall  FINDINGS:  Normal cardiomediastinal contours.  Normal pulmonary vasculature.  No infiltrative opacities.  No pneumothorax.  Prior mediastinotomy.  No acute chest wall abnormality.      Impression:  No acute cardiopulmonary disease   ______________________________________ Electronically signed by: MICHAEL SMITH M.D. Date:     06/02/2025 Time:    06:13     CT PELVIS WO CONTRAST Additional Contrast? None  Result Date: 6/2/2025  EXAM:  CT PELVIS WITHOUT CONTRAST  History:  Pelvis pain  Technique:  2 mm CT bony pelvis without contrast  FINDINGS:  Pelvic ring is intact.  Femoral hips and acetabular intact with mild symmetric osteoarthritic change.  No acute pelvic visceral abnormalities.      Impression: 1.  No acute findings of the bony pelvis or femoral hips.  All CT scans are performed using dose optimization techniques as appropriate to the performed exam and include at least one of the following: Automated exposure control, adjustment of the mA and/or kV according to size, and the use of iterative reconstruction technique.  ______________________________________ Electronically signed by: MICHAEL SMITH M.D. Date:     06/02/2025 Time:    05:25     CT LUMBAR SPINE WO CONTRAST  Result Date:

## 2025-06-13 NOTE — CARE COORDINATION
Updated schedule letter received; Pt has chair at [facility] MWF 1200; first run outpt Monday 6/16/25; [place] has precert. Pt can admit over weekend if attending is agreeable. Spouse notified and RN notified; NP=Valeria notified.    [facility]  [phone] p  [phone] f    Electronically signed by KENDALL Currie on 6/13/2025 at 4:51 PM

## 2025-06-13 NOTE — PROGRESS NOTES
Patient:   Cholo Bhakta  MR#:    [id number]   Room:    [facility]8/308-02   YOB: 1955  Date of Progress Note: 6/13/2025  Time of Note                           8:09 AM  Consulting Physician:   Sajan Faith M.D.  Attending Physician:  Sarbjit Steve MD       CHIEF COMPLAINT: Seizure-like activity  Subjective:  This 69 y.o. male who had some seizure-like activity 6/10.  Patient was sitting on the side of his bed complaining of a bad headache became diaphoretic with nausea and vomiting.  Then had a less than 1 minute episode of eyes rolling back and arms out to the side shaking followed by a collapse into the bed.  Unclear if there was a postictal state as the reports are conflicting.  No prior history of seizure.  CT of the head and routine blood studies relatively unremarkable.  He is admitted with chronic kidney disease with recent progression to dialysis.  Has CHF, CAD, hypertension, hyperlipidemia and diabetes along with chronic A-fib who is currently anticoagulated.  He has been receiving IV Levaquin.  Also reportedly takes Aricept for memory loss.  No further seizure-like episodes.  No complaints this morning except for recurring headaches which are in the frontal region.  REVIEW OF SYSTEMS:  Constitutional: No fevers No chills  Neck:No stiffness  Respiratory: No shortness of breath  Cardiovascular: No chest pain No palpitations  Gastrointestinal: No abdominal pain    Genitourinary: No Dysuria  Neurological: No headache, no confusion      PHYSICAL EXAM:  BP 97/63   Pulse 64   Temp 97.5 °F (36.4 °C) (Oral)   Resp 16   Ht 1.829 m (6' 0.01\")   Wt 118 kg (260 lb 1 oz)   SpO2 100%   BMI 35.26 kg/m²     Constitutional: he appears well-developed and well-nourished.   Eyes - conjunctiva normal.  Pupils react to light  Ear, nose, throat -hearing intact to voice. No scars, masses, or lesions over external nose or ears, no atrophy of tongue  Neck-symmetric, no masses noted, no jugular vein  Avoid Levaquin if able.   2.  Chronic headaches.  Negative CT of the head this admission.  Low-dose nortriptyline at bedtime added.

## 2025-06-13 NOTE — PROGRESS NOTES
Nephrology ([facility]) Progress Note    Patient:  Cholo Bhakta  YOB: 1955  Date of Service: 6/13/2025  MRN: [id number]   Acct: [id number]   Primary Care Physician: Nirmal Nuno MD  Advance Directive: Full Code  Admit Date: 6/2/2025       Hospital Day: 11  Referring Provider: Sarbjit Steve MD    Patient independently seen and examined, Chart, Consults, Notes, Operative notes, Labs, Cardiology, and Radiology studies reviewed as available.    Subjective:  Cholo Bhakta is a 69 y.o. male for whom we were consulted for evaluation and treatment of chronic kidney disease progressing to end-stage renal disease.  Patient has multiple medical history including morbid obesity, CAD, CABG, PVD, CVA, CHF, diabetic foot ulcer and hypertension.  He has been followed for multiple admissions in the office for many years.  He had previously declined dialysis initiation but was agreeable after meeting with Dr. rFeed last week.    Today, patient reported no overnight issues.  He denied current chest pain, shortness of air at rest, nausea or vomiting.  Awaiting outpatient disposition.  No issues with dialysis yesterday            Allergies:  Cefepime, Bactrim [sulfamethoxazole-trimethoprim], and Metronidazole    Medicines:  Current Facility-Administered Medications   Medication Dose Route Frequency Provider Last Rate Last Admin    nortriptyline (PAMELOR) capsule 10 mg  10 mg Oral Nightly Sajan Faith MD        butalbital-acetaminophen-caffeine (FIORICET, ESGIC) per tablet 1 tablet  1 tablet Oral Q4H PRN Sajan Faith MD   1 tablet at 06/13/25 0022    metoclopramide (REGLAN) tablet 5 mg  5 mg Oral TID PRN Valeria Lynch APRN - CNP        apixaban (ELIQUIS) tablet 5 mg  5 mg Oral BID Valeria Lynch APRN - CNP   5 mg at 06/13/25 0839    bisacodyl (DULCOLAX) suppository 10 mg  10 mg Rectal Once Valeria Lynch APRN - CNP        miconazole (MICOTIN) 2 % powder   Topical BID  aspect  medial to the distal phalanx of the great toe and plantar aspect at the level of the proximal phalanx of the second toe.      ______________________________________ Electronically signed by: WENDIE PHILLIPS M.D. Date:     06/02/2025 Time:    06:25     XR ELBOW LEFT (2 VIEWS)  Result Date: 6/2/2025  EXAM:   XR LEFT ELBOW.  HISTORY:   Left elbow pain.  TECHNIQUE:   2 views.  Frontal and lateral.  COMPARISON:   No prior study is available for comparison.  FINDINGS: There is no fracture or dislocation. The soft tissues are normal. There is a large olecranon spur.       1.  No fracture or dislocation.    ______________________________________ Electronically signed by: CAROLINA CRAWFORD D.O. Date:     06/02/2025 Time:    06:24     XR CHEST PORTABLE  Result Date: 6/2/2025  EXAM:  CHEST ONE-VIEW  History:  Status post fall  FINDINGS:  Normal cardiomediastinal contours.  Normal pulmonary vasculature.  No infiltrative opacities.  No pneumothorax.  Prior mediastinotomy.  No acute chest wall abnormality.      Impression:  No acute cardiopulmonary disease   ______________________________________ Electronically signed by: MICHAEL SMITH M.D. Date:     06/02/2025 Time:    06:13     CT PELVIS WO CONTRAST Additional Contrast? None  Result Date: 6/2/2025  EXAM:  CT PELVIS WITHOUT CONTRAST  History:  Pelvis pain  Technique:  2 mm CT bony pelvis without contrast  FINDINGS:  Pelvic ring is intact.  Femoral hips and acetabular intact with mild symmetric osteoarthritic change.  No acute pelvic visceral abnormalities.      Impression: 1.  No acute findings of the bony pelvis or femoral hips.  All CT scans are performed using dose optimization techniques as appropriate to the performed exam and include at least one of the following: Automated exposure control, adjustment of the mA and/or kV according to size, and the use of iterative reconstruction technique.  ______________________________________ Electronically signed by: MICHAEL

## 2025-06-13 NOTE — PROGRESS NOTES
Nutrition Assessment     Type and Reason for Visit: Reassess    Nutrition Recommendations/Plan:   Continue current POC     Malnutrition Assessment:  Malnutrition Status: At risk for malnutrition    Nutrition Assessment:  Patient watching tv at time of visit.  Pt not very hungry this morning--only ate 25-50% of breakfast.  Dialysis continues.  Has had a weight loss of approx 13# or 5.1% in the past week.    Estimated Daily Nutrient Needs:  Energy (kcal):  6397-6088 kcals (15-18 kcals/kg) Weight Used for Energy Requirements: Current     Protein (g):  97-162g Weight Used for Protein Requirements: Ideal        Fluid (ml/day):  3731-8038 ml Method Used for Fluid Requirements: Standard renal    Nutrition Related Findings:   +1 nonpitting BLE edema., +1 BUE edema Wound Type: Stage II, Multiple, Diabetic Ulcer    Current Nutrition Therapies:    ADULT DIET; Regular; 4 carb choices (60 gm/meal)    Anthropometric Measures:  Height: 182.9 cm (6' 0.01\")  Current Body Wt: 118 kg (260 lb 2.3 oz)   BMI: 35.3        Nutrition Diagnosis:   Inadequate protein-energy intake, Altered nutrition-related lab values related to acute injury/trauma, increase demand for energy/nutrients, endocrine dysfunction as evidenced by intake 26-50%, intake 51-75%, poor intake prior to admission, wounds, dialysis, lab values    Nutrition Interventions:   Food and/or Nutrient Delivery: Continue Current Diet  Nutrition Education/Counseling: No recommendation at this time  Coordination of Nutrition Care: Continue to monitor while inpatient       Goals:  Goals: Meet at least 75% of estimated needs, PO intake 50% or greater, Maintain adequate nutrition status  Type of Goal: Continue current goal  Previous Goal Met: Progressing toward Goal(s)    Nutrition Monitoring and Evaluation:   Behavioral-Environmental Outcomes: None Identified  Food/Nutrient Intake Outcomes: Food and Nutrient Intake  Physical Signs/Symptoms Outcomes: Biochemical Data, Chewing or  Swallowing, Fluid Status or Edema, Weight, Skin    Discharge Planning:    Too soon to determine     Deidre Azevedo MS, RD, LD  Contact: [phone]

## 2025-06-13 NOTE — PROGRESS NOTES
Spiritual Health History and Assessment/Progress Note  [facility]    Follow-up, Spiritual/Emotional Needs,  , Life Adjustments, Adjustment to illness,      Name: Cholo Bhakta MRN: [id number]    Age: 69 y.o.     Sex: male   Language: English   Anglican: Hindu   SANGEETA (acute kidney injury)     Date: 6/13/2025            Total Time Calculated: 19 min              Spiritual Assessment continued in [facility] 3 KENNETH/VAS/MED        Referral/Consult From: Palliative Care   Encounter Overview/Reason: Follow-up, Spiritual/Emotional Needs  Service Provided For: Patient    Ana María, Belief, Meaning:   Patient identifies as spiritual. The pt has been active in [employer] in the past but is not now. He expresses ana maría and seems at peace.   Family/Friends       Importance and Influence:  Patient has spiritual/personal beliefs that influence decisions regarding their health.  He states he trusts God to take care of him.   Family/Friends     Community:  Patient Inactive by [employer].   Family/Friends     Assessment and Plan of Care:     Patient Interventions include: Facilitated expression of thoughts and feelings  Family/Friends Interventions include:     Patient Plan of Care:   Family/Friends Plan of Care:     Electronically signed by Chaplain Emile on 6/13/2025 at 9:13 AM

## 2025-06-14 VITALS
BODY MASS INDEX: 35 KG/M2 | DIASTOLIC BLOOD PRESSURE: 64 MMHG | RESPIRATION RATE: 14 BRPM | OXYGEN SATURATION: 100 % | HEART RATE: 88 BPM | SYSTOLIC BLOOD PRESSURE: 93 MMHG | WEIGHT: 258.38 LBS | TEMPERATURE: 98.1 F | HEIGHT: 72 IN

## 2025-06-14 LAB
ALBUMIN SERPL-MCNC: 3.8 G/DL (ref 3.5–5.2)
ALP SERPL-CCNC: 122 U/L (ref 40–129)
ALT SERPL-CCNC: 15 U/L (ref 10–50)
ANION GAP SERPL CALCULATED.3IONS-SCNC: 17 MMOL/L (ref 8–16)
AST SERPL-CCNC: 35 U/L (ref 10–50)
BASOPHILS # BLD: 0.1 K/UL (ref 0–0.2)
BASOPHILS NFR BLD: 0.8 % (ref 0–1)
BILIRUB SERPL-MCNC: 0.5 MG/DL (ref 0.2–1.2)
BUN SERPL-MCNC: 39 MG/DL (ref 8–23)
CALCIUM SERPL-MCNC: 9.2 MG/DL (ref 8.8–10.2)
CHLORIDE SERPL-SCNC: 97 MMOL/L (ref 98–107)
CO2 SERPL-SCNC: 25 MMOL/L (ref 22–29)
CREAT SERPL-MCNC: 6.5 MG/DL (ref 0.7–1.2)
EOSINOPHIL # BLD: 0.5 K/UL (ref 0–0.6)
EOSINOPHIL NFR BLD: 5.7 % (ref 0–5)
ERYTHROCYTE [DISTWIDTH] IN BLOOD BY AUTOMATED COUNT: 15.2 % (ref 11.5–14.5)
GLUCOSE BLD-MCNC: 108 MG/DL (ref 70–99)
GLUCOSE BLD-MCNC: 130 MG/DL (ref 70–99)
GLUCOSE BLD-MCNC: 258 MG/DL (ref 70–99)
GLUCOSE SERPL-MCNC: 115 MG/DL (ref 70–99)
HCT VFR BLD AUTO: 36.9 % (ref 42–52)
HGB BLD-MCNC: 11.9 G/DL (ref 14–18)
IMM GRANULOCYTES # BLD: 0.1 K/UL
LYMPHOCYTES # BLD: 1.5 K/UL (ref 1.1–4.5)
LYMPHOCYTES NFR BLD: 18.4 % (ref 20–40)
MCH RBC QN AUTO: 29 PG (ref 27–31)
MCHC RBC AUTO-ENTMCNC: 32.2 G/DL (ref 33–37)
MCV RBC AUTO: 90 FL (ref 80–94)
MONOCYTES # BLD: 1.3 K/UL (ref 0–0.9)
MONOCYTES NFR BLD: 16.7 % (ref 0–10)
NEUTROPHILS # BLD: 4.5 K/UL (ref 1.5–7.5)
NEUTS SEG NFR BLD: 57.5 % (ref 50–65)
PERFORMED ON: ABNORMAL
PLATELET # BLD AUTO: 178 K/UL (ref 130–400)
PMV BLD AUTO: 12.4 FL (ref 9.4–12.4)
POTASSIUM SERPL-SCNC: 4.6 MMOL/L (ref 3.5–5)
PROT SERPL-MCNC: 7.2 G/DL (ref 6.4–8.3)
RBC # BLD AUTO: 4.1 M/UL (ref 4.7–6.1)
SODIUM SERPL-SCNC: 139 MMOL/L (ref 136–145)
WBC # BLD AUTO: 7.9 K/UL (ref 4.8–10.8)

## 2025-06-14 PROCEDURE — 6370000000 HC RX 637 (ALT 250 FOR IP): Performed by: SURGERY

## 2025-06-14 PROCEDURE — 36415 COLL VENOUS BLD VENIPUNCTURE: CPT

## 2025-06-14 PROCEDURE — 6370000000 HC RX 637 (ALT 250 FOR IP): Performed by: INTERNAL MEDICINE

## 2025-06-14 PROCEDURE — 99232 SBSQ HOSP IP/OBS MODERATE 35: CPT | Performed by: PSYCHIATRY & NEUROLOGY

## 2025-06-14 PROCEDURE — 2500000003 HC RX 250 WO HCPCS: Performed by: ANESTHESIOLOGY

## 2025-06-14 PROCEDURE — 8010000000 HC HEMODIALYSIS ACUTE INPT

## 2025-06-14 PROCEDURE — 82962 GLUCOSE BLOOD TEST: CPT

## 2025-06-14 PROCEDURE — 80053 COMPREHEN METABOLIC PANEL: CPT

## 2025-06-14 PROCEDURE — 6370000000 HC RX 637 (ALT 250 FOR IP): Performed by: PSYCHIATRY & NEUROLOGY

## 2025-06-14 PROCEDURE — 6370000000 HC RX 637 (ALT 250 FOR IP)

## 2025-06-14 PROCEDURE — 85025 COMPLETE CBC W/AUTO DIFF WBC: CPT

## 2025-06-14 PROCEDURE — 94760 N-INVAS EAR/PLS OXIMETRY 1: CPT

## 2025-06-14 RX ORDER — ISOSORBIDE DINITRATE 20 MG/1
20 TABLET ORAL 3 TIMES DAILY
Qty: 90 TABLET | Refills: 0 | Status: SHIPPED | OUTPATIENT
Start: 2025-06-14

## 2025-06-14 RX ORDER — METOPROLOL SUCCINATE 25 MG/1
25 TABLET, EXTENDED RELEASE ORAL DAILY
Qty: 30 TABLET | Refills: 0 | Status: SHIPPED | OUTPATIENT
Start: 2025-06-14

## 2025-06-14 RX ORDER — OXYCODONE HYDROCHLORIDE 10 MG/1
10 TABLET ORAL 2 TIMES DAILY PRN
Qty: 6 TABLET | Refills: 0 | Status: SHIPPED | OUTPATIENT
Start: 2025-06-14 | End: 2025-06-17

## 2025-06-14 RX ORDER — MIDODRINE HYDROCHLORIDE 5 MG/1
5 TABLET ORAL
Qty: 90 TABLET | Refills: 0 | Status: SHIPPED | OUTPATIENT
Start: 2025-06-14

## 2025-06-14 RX ORDER — NORTRIPTYLINE HYDROCHLORIDE 10 MG/1
10 CAPSULE ORAL NIGHTLY
Qty: 30 CAPSULE | Refills: 0 | Status: SHIPPED | OUTPATIENT
Start: 2025-06-14

## 2025-06-14 RX ORDER — ESCITALOPRAM OXALATE 10 MG/1
10 TABLET ORAL DAILY
Qty: 30 TABLET | Refills: 0 | Status: SHIPPED | OUTPATIENT
Start: 2025-06-14

## 2025-06-14 RX ORDER — PREGABALIN 50 MG/1
50 CAPSULE ORAL 2 TIMES DAILY
Qty: 6 CAPSULE | Refills: 0 | Status: SHIPPED | OUTPATIENT
Start: 2025-06-14 | End: 2025-06-17

## 2025-06-14 RX ADMIN — CALCITRIOL CAPSULES 0.25 MCG 0.25 MCG: 0.25 CAPSULE ORAL at 13:22

## 2025-06-14 RX ADMIN — HYDROCODONE BITARTRATE AND ACETAMINOPHEN 2 TABLET: 5; 325 TABLET ORAL at 13:20

## 2025-06-14 RX ADMIN — APIXABAN 5 MG: 5 TABLET, FILM COATED ORAL at 13:22

## 2025-06-14 RX ADMIN — ESCITALOPRAM OXALATE 10 MG: 10 TABLET ORAL at 13:22

## 2025-06-14 RX ADMIN — MIDODRINE HYDROCHLORIDE 5 MG: 5 TABLET ORAL at 13:26

## 2025-06-14 RX ADMIN — SODIUM CHLORIDE, PRESERVATIVE FREE 10 ML: 5 INJECTION INTRAVENOUS at 08:17

## 2025-06-14 RX ADMIN — ISOSORBIDE DINITRATE 20 MG: 10 TABLET ORAL at 13:19

## 2025-06-14 RX ADMIN — MIDODRINE HYDROCHLORIDE 5 MG: 5 TABLET ORAL at 07:56

## 2025-06-14 RX ADMIN — DAKIN'S SOLUTION 0.125% (QUARTER STRENGTH): 0.12 SOLUTION at 08:11

## 2025-06-14 RX ADMIN — FAMOTIDINE 20 MG: 20 TABLET, FILM COATED ORAL at 13:22

## 2025-06-14 RX ADMIN — PANTOPRAZOLE SODIUM 40 MG: 40 TABLET, DELAYED RELEASE ORAL at 13:22

## 2025-06-14 RX ADMIN — BUSPIRONE HYDROCHLORIDE 10 MG: 10 TABLET ORAL at 15:26

## 2025-06-14 RX ADMIN — METOPROLOL SUCCINATE 25 MG: 25 TABLET, EXTENDED RELEASE ORAL at 13:19

## 2025-06-14 RX ADMIN — DONEPEZIL HYDROCHLORIDE 10 MG: 10 TABLET, FILM COATED ORAL at 13:23

## 2025-06-14 RX ADMIN — INSULIN GLARGINE 10 UNITS: 100 INJECTION, SOLUTION SUBCUTANEOUS at 07:56

## 2025-06-14 RX ADMIN — MICONAZOLE NITRATE: 2 POWDER TOPICAL at 08:11

## 2025-06-14 RX ADMIN — LACTULOSE 20 G: 20 SOLUTION ORAL at 13:18

## 2025-06-14 RX ADMIN — BUTALBITAL, ACETAMINOPHEN, AND CAFFEINE 1 TABLET: 325; 50; 40 TABLET ORAL at 13:20

## 2025-06-14 RX ADMIN — PREGABALIN 50 MG: 50 CAPSULE ORAL at 13:19

## 2025-06-14 RX ADMIN — HYDROCODONE BITARTRATE AND ACETAMINOPHEN 2 TABLET: 5; 325 TABLET ORAL at 06:03

## 2025-06-14 RX ADMIN — FERROUS SULFATE TAB 325 MG (65 MG ELEMENTAL FE) 325 MG: 325 (65 FE) TAB at 13:22

## 2025-06-14 ASSESSMENT — PAIN DESCRIPTION - DESCRIPTORS
DESCRIPTORS: ACHING;DISCOMFORT;THROBBING;SHARP
DESCRIPTORS: THROBBING;BURNING;NUMBNESS
DESCRIPTORS: ACHING

## 2025-06-14 ASSESSMENT — PAIN SCALES - GENERAL
PAINLEVEL_OUTOF10: 10
PAINLEVEL_OUTOF10: 9
PAINLEVEL_OUTOF10: 4
PAINLEVEL_OUTOF10: 10

## 2025-06-14 ASSESSMENT — PAIN DESCRIPTION - LOCATION
LOCATION: FOOT
LOCATION: HEAD
LOCATION: LEG

## 2025-06-14 ASSESSMENT — PAIN DESCRIPTION - ORIENTATION
ORIENTATION: LEFT
ORIENTATION: LEFT

## 2025-06-14 NOTE — PROGRESS NOTES
at all   Food Insecurity: No Food Insecurity (6/2/2025)    Hunger Vital Sign     Worried About Running Out of Food in the Last Year: Never true     Ran Out of Food in the Last Year: Never true   Transportation Needs: No Transportation Needs (6/2/2025)    PRAPARE - Transportation     Lack of Transportation (Medical): No     Lack of Transportation (Non-Medical): No   Physical Activity: Unknown (4/2/2025)    Physical Activity ([facility])     In the last 30 days, other than the activities you did for work, on average, how many days per week did you engage in moderate exercise (like walking fast, running, jogging, dancing, swimming, biking, or other similar activites)?: 0     Number of minutes of exercise per week:: 0   Stress: Not on file   Social Connections: Socially Integrated (4/2/2025)    Social Connections ([facility] HRS[facility])     If for any reason you need help with day-to-day activities such as bathing, preparing meals, shopping, managing finances, etc., do you get the help you need?: I don't need any help   Intimate Partner Violence: Not At Risk (1/6/2025)    Received from [facility]    Abuse Screen     Feels Unsafe at Home or Work/School: no     Feels Threatened by Someone: no     Does Anyone Try to Keep You From Having Contact with Others or Doing Things Outside Your Home?: no     Physical Signs of Abuse Present: no   Housing Stability: Low Risk  (6/2/2025)    Housing Stability Vital Sign     Unable to Pay for Housing in the Last Year: No     Number of Times Moved in the Last Year: 1     Homeless in the Last Year: No       Medications:   sodium chloride      sodium chloride      dextrose        nortriptyline  10 mg Oral Nightly    apixaban  5 mg Oral BID    bisacodyl  10 mg Rectal Once    miconazole   Topical BID    sennosides-docusate sodium  2 tablet Oral BID    lactulose  20 g Oral TID    polyethylene glycol  17 g Oral Daily    midodrine  5 mg Oral TID WC    metoprolol succinate  25 mg Oral Daily     pg    MCHC 32.2 (L) 33.0 - 37.0 g/dL    RDW 15.2 (H) 11.5 - 14.5 %    Platelets 178 130 - 400 K/uL    MPV 12.4 9.4 - 12.4 fL    Neutrophils % 57.5 50.0 - 65.0 %    Lymphocytes % 18.4 (L) 20.0 - 40.0 %    Monocytes % 16.7 (H) 0.0 - 10.0 %    Eosinophils % 5.7 (H) 0.0 - 5.0 %    Basophils % 0.8 0.0 - 1.0 %    Neutrophils Absolute 4.5 1.5 - 7.5 K/uL    Immature Granulocytes # 0.1 K/uL    Lymphocytes Absolute 1.5 1.1 - 4.5 K/uL    Monocytes Absolute 1.30 (H) 0.00 - 0.90 K/uL    Eosinophils Absolute 0.50 0.00 - 0.60 K/uL    Basophils Absolute 0.10 0.00 - 0.20 K/uL   POCT Glucose    Collection Time: 06/14/25  7:19 AM   Result Value Ref Range    POC Glucose 108 (H) 70 - 99 mg/dl    Performed on AccuChek    POCT Glucose    Collection Time: 06/14/25  1:14 PM   Result Value Ref Range    POC Glucose 130 (H) 70 - 99 mg/dl    Performed on AccuChek        Diet:  ADULT DIET; Regular; 4 carb choices (60 gm/meal)    Examination:  Vitals: /75   Pulse 86   Temp 97.7 °F (36.5 °C)   Resp 18   Ht 1.829 m (6' 0.01\")   Wt 117.2 kg (258 lb 6.1 oz)   SpO2 100%   BMI 35.03 kg/m²  No intake or output data in the 24 hours ending 06/14/25 1506  General appearance: He is an overweight man who is cooperative and appears in no distress.  HEENT: Exam; heent: Head: Normal, normocephalic, atraumatic.  Lungs: clear to auscultation bilaterally  Heart: regular rate and rhythm, S1, S2 normal, no murmur, click, rub or gallop  Abdomen: soft, non-tender; bowel sounds normal; no masses,  no organomegaly  Extremities: LE mild wounds.  Left lower leg/ankle is ace wrapped, OA joint changes  Neurologic:  Alert, oriented.  No dysarthria.  Speech is fluent.  EOMI, PERRL, VFF.  No facial weakness.  Limb strength is grossly normal.  No pronator drift.  Rapid alternating movements are normal.  Finger to nose testing shows no dysmetria.    Assessment:   1. Single seizure after starting dialysis  2. Chronic headaches  3. ESRD on HD  4. Diabetes  5. Chronic

## 2025-06-14 NOTE — PROGRESS NOTES
Physical Therapy  Name: Cholo Bhakta  MRN:  [id number]  Date of service:  6/14/2025      Pt not in room, pt in dialysis  Therapy will cont to follow      Electronically signed by Lluvia Wynn PTA on 6/14/2025 at 2:49 PM

## 2025-06-14 NOTE — PLAN OF CARE
Problem: Nutrition Deficit:  Goal: Optimize nutritional status  6/13/2025 2310 by Malathi Brownlee, RN  Outcome: Progressing  6/13/2025 0935 by eDidre Azevedo MS, RD, LD  Outcome: Progressing  Flowsheets (Taken 6/13/2025 0952)  Nutrient intake appropriate for improving, restoring, or maintaining nutritional needs:   Assess nutritional status and recommend course of action   Monitor oral intake, labs, and treatment plans   Recommend appropriate diets, oral nutritional supplements, and vitamin/mineral supplements     Problem: Chronic Conditions and Co-morbidities  Goal: Patient's chronic conditions and co-morbidity symptoms are monitored and maintained or improved  Outcome: Progressing     Problem: Discharge Planning  Goal: Discharge to home or other facility with appropriate resources  Outcome: Progressing     Problem: Safety - Adult  Goal: Free from fall injury  Outcome: Progressing     Problem: Skin/Tissue Integrity  Goal: Skin integrity remains intact  Description: 1.  Monitor for areas of redness and/or skin breakdown2.  Assess vascular access sites hourly3.  Every 4-6 hours minimum:  Change oxygen saturation probe site4.  Every 4-6 hours:  If on nasal continuous positive airway pressure, respiratory therapy assess nares and determine need for appliance change or resting period  Outcome: Progressing     Problem: Pain  Goal: Verbalizes/displays adequate comfort level or baseline comfort level  Outcome: Progressing     Problem: Neurosensory - Adult  Goal: Achieves stable or improved neurological status  Outcome: Progressing     Problem: Respiratory - Adult  Goal: Achieves optimal ventilation and oxygenation  Outcome: Progressing     Problem: Cardiovascular - Adult  Goal: Maintains optimal cardiac output and hemodynamic stability  Outcome: Progressing  Goal: Absence of cardiac dysrhythmias or at baseline  Outcome: Progressing     Problem: Skin/Tissue Integrity - Adult  Goal: Skin integrity remains

## 2025-06-14 NOTE — DIALYSIS
[facility] INPATIENT SERVICES  DIALYSIS TREATMENT SUMMARY      Note: Consult with the attending physician for patient treatment orders, this document is not a physician order.      Patient Information   Patient Cholo Bhakta   Date of Birth June 18, 1955   Chart Number [id number]   [facility] MRN [id number]   Gender Male   SSN (last 4) [id number]     Treatment Information   Treatment Type Hemodialysis   Treatment Id [id number]   Start Time June 14, 2025 08:32   End Time June 14, 2025 12:07   Acutal Duration 03:35     Treatment Balances   Total Saline Administered 500   Net Fluid Balance -2500    Hemodialysis Orders   Therapy Standard   Orders Verified Time 06/14/2025 08:00    Date Verified 06/14/2025   Duration 3:30   Isolated UF/Bypass No   BFR (mL) 450   DFR (mL) 700   Dialyzer Type OPTIFLUX 160NR   UF Order UF Goal Ordered   UF Goal Ordered (mL) 2500   Crit-Line used No   Heparin Initial Units Bolus No   Heparin IV Maintenance Bolus No   Heparin IV Infusion No   Potassium (mEq/L) 2.0   Calcium (mEq/L) 2.5   Bicarb (mg/dL) 35   Sodium (mEq/L) 138   Clinician Stephanie Wilson, RN    Dialysis Access   Access Type Central Venous Catheter   Central Venous Catheter   Access Type Catheter - Tunneled   Access Location Chest Wall - R   Current/New [facility] Patient Yes   1st Use Catheter Verified by Previous Use   Catheter Care Completed per Policy Yes   Dressing Dry and Intact on Arrival Yes   Dressing Changed Yes   Type of Dressing Film Biopatch/CHG   Dressing Changed By [facility] Staff   Type of HD Caps Curos   HD Caps Changed Yes   CVC Line Education Provided Yes - Infection Prevention      Vitals   Pre-Treatment Vitals   Time Is BP being recorded? Pre BP Map BP Method Pulse RR Temp How was Weight Obtained? Pre Weight Previous Dry Weight Previous Post Weight Metric Target Fluid Removal (mL) Dialysate Confirmed Clinician    06/14/2025 08:28 BP/Map 109/65 (80) Noninvasive 63 16 98.4 How  Serial # [id number]   Portable RO    RO Serial#    Residual Bleach Negative N/A   Was a manufactured mix used? No   Machine Log Completed Yes   Total Chlorine (less than 0.1)? Yes   Total Chlorine Log Completed Yes   Bicarb BiBag   Bibag Size 900   Machine Temp 36   Machine Conductivity 14.1   Meter Type N/A   Meter Conductivity    Independent Conductivity 13.8   pH Status Pass Pass   pH    TCD Value 13.8   TCD Alarm with +/- 0.5 Yes   NVL enabled validated 100 asymmetric Yes   Safety check complete Stephanie Wilson RN   Second Verification Performed? No   Second Verification Performed By    Reason Not Verified No other staff members located at the hospital      Facility Information Room # 308 Diagnosis SANGEETA   Facility Information Bed # 6 Isolation Information   Admission Date 06/02/2025 Stat Treatment No Isolation Required? N   Ordering MD [place] Patient Type Chronic dialysis patient with diagnosis of ESRD Completed by   Account/Finance Number [id number] Patient Chronic Unit [facility] information Entered by Stephanie Wilson RN   Admission Status InPatient Code Status Full Code    Location Dialysis Suite Multi Diagnosis      Start Treatment Time Out Confirmed by Elvis Transporter Correct access site verified Yes   Treatment Initiation Connections Secured Time Out Completed 08:27 Treatment Start Date 06/14/2025    Saline line double clamped Correct patient verified Yes Treatment Start Time 08:32    Hemaclip Applied Correct procedure verified Yes Patient/Family questions and concerns addressed Yes     Pre Focused Assessment Location Lower Ext.  Non distended   Access Cardiac  Nontender   Signs and Symptoms of Infection? No Heart Rhythm Regular    Pain Screening Telemetry Yes  Voids   Does the patient have pain? No Skin Completed by   Respiratory Skin Warm Pre Treatment Focused Assessment Completed By Stephanie Wilson RN   Lung Sounds Clear  Dry Time 08:29   Location Bilateral LOC Signing

## 2025-06-14 NOTE — PROGRESS NOTES
Rectal Q6H PRN Aj Lyons MD        glucose chewable tablet 16 g  4 tablet Oral PRN Aj Lyons MD        dextrose bolus 10% 125 mL  125 mL IntraVENous PRN Aj Lyons MD        Or    dextrose bolus 10% 250 mL  250 mL IntraVENous PRN Aj Lyons MD        glucagon injection 1 mg  1 mg SubCUTAneous PRN Aj Lyons MD        dextrose 10 % infusion   IntraVENous Continuous PRN Aj Lyons MD        insulin lispro (HUMALOG,ADMELOG) injection vial 0-4 Units  0-4 Units SubCUTAneous 4x Daily AC & HS Aj Lyons MD   1 Units at 06/13/25 2147    insulin glargine (LANTUS) injection vial 10 Units  10 Units SubCUTAneous BID Aj Lyons MD   10 Units at 06/14/25 0756       Past Medical History:  Past Medical History:   Diagnosis Date    CHF (congestive heart failure) (HCC)     Chronic kidney disease     Diabetes mellitus (HCC)     Hyperlipidemia     Hypertension     Palliative care patient 03/07/2025       Past Surgical History:  Past Surgical History:   Procedure Laterality Date    CORONARY ARTERY BYPASS GRAFT      VASCULAR SURGERY N/A 6/4/2025    PERMCATH INSERTION performed by Aj Lyons MD at [facility] OR       Family History  History reviewed. No pertinent family history.    Social History  Social History     Socioeconomic History    Marital status:      Spouse name: Not on file    Number of children: Not on file    Years of education: Not on file    Highest education level: Not on file   Occupational History    Not on file   Tobacco Use    Smoking status: Never    Smokeless tobacco: Never   Substance and Sexual Activity    Alcohol use: Not Currently    Drug use: Never    Sexual activity: Not on file   Other Topics Concern    Not on file   Social History Narrative    Not on file     Social Drivers of Health     Financial Resource Strain: Low Risk  (4/2/2025)    Overall Financial Resource Strain (CARDIA)     Difficulty of Paying Living Expenses: Not hard at all   Food  reconstruction technique.  ______________________________________ Electronically signed by: MICHAEL SMITH M.D. Date:     06/02/2025 Time:    05:21     CT CERVICAL SPINE WO CONTRAST  Result Date: 6/2/2025  CT CERVICAL SPINE WITHOUT CONTRAST  HISTORY:  Status post fall  TECHNIQUE:  CT of the cervical spine with multiplanar reformations.  FINDINGS:  Reformatted images demonstrate normal alignment with preservation of vertebral body height. Prior fusion C5 - C7.  No fracture seen on the axial or reformatted images. No acute surrounding soft tissue abnormalitites. Lung apices are clear.      No acute findings in the cervical spine.  All CT scans are performed using dose optimization techniques as appropriate to the performed exam and include at least one of the following: Automated exposure control, adjustment of the mA and/or kV according to size, and the use of iterative reconstruction technique.  ______________________________________ Electronically signed by: MICHAEL SMITH M.D. Date:     06/02/2025 Time:    05:19        Assessment   End-stage renal disease  Diabetes type 2 with diabetic nephropathy  Peripheral vascular disease  Anemia in chronic kidney disease  Secondary hyperparathyroidism  Diabetic foot ulcer  Hyponatremia      Plan:  Discussed with patient, nursing  Workup reviewed today  Monitor labs  Dialysis currently Tuesday Thursday Saturday per routine scheduling  Cardiology evaluation reviewed as current (6/6)  Disposition ongoing    Norbert Hatfield MD  06/14/25  1:39 PM

## 2025-06-14 NOTE — DISCHARGE SUMMARY
[facility]    Discharge Summary      Cholo Bhakta  :  1955  MRN:  [id number]    Admit date:  2025  Discharge date:    2025    Discharging Physician:  Dr. Steve    Advance Directive: Full Code    Consults: Nephrology, neurology, cardiology, podiatry, vascular surgery    Primary Care Physician:  Nirmal Nuno MD    Discharge Diagnoses:  Principal Problem:    SANGEETA (acute kidney injury)  Active Problems:    CKD (chronic kidney disease) stage 4, GFR 15-29 ml/min (HCC)    Chronic obstructive pulmonary disease (HCC)    Essential hypertension    Diabetes mellitus (HCC)    CAD (coronary artery disease)    Diabetic ulcer of left foot associated with type 2 diabetes mellitus (HCC)    Memory loss    Severe malnutrition    Fall  Resolved Problems:    * No resolved hospital problems. *      Portions of this note have been copied forward, however, changed to reflect the most current clinical status of this patient.    Hospital Course:    This patient is a 69-year-old male with a past medical history of hypertension, hyperlipidemia, type 2 diabetes mellitus, CKD, chronic atrial fibrillation on anticoagulation, chronic systolic heart failure, CAD s/p CABG who presented to [facility] ED on 2025 after sustaining a fall.  He also has a sacral pressure wound and left diabetic ulcer noted that he is seeing podiatry for.  ED workup revealed--CT head with no acute intracranial abnormality, CT cervical/lumbar spine with no acute osseous abnormality, CT pelvis with no acute findings, chest x-ray with no acute findings.  Creatinine 5.3, .  Patient was admitted to hospitalist service with consultation to nephrology and podiatry.  Patient was agreeable to dialysis.  He underwent permacath placement on .  He has been tolerating dialysis well.  He was started on midodrine as needed for hypotension during dialysis.  Podiatry evaluated patient and felt there were no signs of acute infection, plan to follow-up with

## 2025-06-23 ENCOUNTER — APPOINTMENT (OUTPATIENT)
Dept: CT IMAGING | Age: 70
DRG: 312 | End: 2025-06-23
Payer: MEDICARE

## 2025-06-23 ENCOUNTER — HOSPITAL ENCOUNTER (INPATIENT)
Age: 70
LOS: 1 days | Discharge: HOME | DRG: 312 | End: 2025-06-27
Attending: STUDENT IN AN ORGANIZED HEALTH CARE EDUCATION/TRAINING PROGRAM | Admitting: HOSPITALIST
Payer: MEDICARE

## 2025-06-23 ENCOUNTER — APPOINTMENT (OUTPATIENT)
Dept: GENERAL RADIOLOGY | Age: 70
DRG: 312 | End: 2025-06-23
Payer: MEDICARE

## 2025-06-23 ENCOUNTER — TELEPHONE (OUTPATIENT)
Dept: NEUROLOGY | Age: 70
End: 2025-06-23

## 2025-06-23 DIAGNOSIS — R41.82 ALTERED MENTAL STATUS, UNSPECIFIED ALTERED MENTAL STATUS TYPE: ICD-10-CM
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
## 2025-06-23 PROBLEM — I50.22 SYSTOLIC CHF, CHRONIC (HCC): Status: ACTIVE | Noted: 2025-02-21

## 2025-06-23 PROBLEM — I50.23 ACUTE ON CHRONIC SYSTOLIC HEART FAILURE (HCC): Status: RESOLVED | Noted: 2025-02-21 | Resolved: 2025-06-23

## 2025-06-23 PROBLEM — F41.9 ANXIETY: Status: ACTIVE | Noted: 2023-10-26

## 2025-06-23 PROBLEM — R56.9 SEIZURE-LIKE ACTIVITY (HCC): Status: ACTIVE | Noted: 2025-06-23

## 2025-06-23 PROBLEM — I50.23 ACUTE ON CHRONIC SYSTOLIC CHF (CONGESTIVE HEART FAILURE) (HCC): Status: RESOLVED | Noted: 2025-04-02 | Resolved: 2025-06-23

## 2025-06-23 PROBLEM — N18.6 ESRD (END STAGE RENAL DISEASE) ON DIALYSIS (HCC): Chronic | Status: ACTIVE | Noted: 2025-02-21

## 2025-06-23 PROBLEM — N17.9 AKI (ACUTE KIDNEY INJURY): Status: RESOLVED | Noted: 2025-06-02 | Resolved: 2025-06-23

## 2025-06-23 PROBLEM — Z99.2 ESRD (END STAGE RENAL DISEASE) ON DIALYSIS (HCC): Chronic | Status: ACTIVE | Noted: 2025-02-21

## 2025-06-23 PROBLEM — I48.91 ATRIAL FIBRILLATION (HCC): Status: ACTIVE | Noted: 2022-05-06

## 2025-06-23 PROBLEM — I50.9 ACUTE ON CHRONIC CONGESTIVE HEART FAILURE, UNSPECIFIED HEART FAILURE TYPE (HCC): Status: RESOLVED | Noted: 2025-03-06 | Resolved: 2025-06-23

## 2025-06-23 PROBLEM — R53.1 GENERAL WEAKNESS: Status: ACTIVE | Noted: 2025-06-23

## 2025-06-23 PROBLEM — K59.09 CHRONIC CONSTIPATION: Status: ACTIVE | Noted: 2020-10-06

## 2025-06-23 PROBLEM — R29.6 FALLS: Status: ACTIVE | Noted: 2025-06-23

## 2025-06-23 PROBLEM — E78.5 HYPERLIPIDEMIA, UNSPECIFIED: Status: ACTIVE | Noted: 2023-10-11

## 2025-06-23 LAB
ALBUMIN SERPL-MCNC: 3.8 G/DL (ref 3.5–5.2)
ALP SERPL-CCNC: 128 U/L (ref 40–129)
ALT SERPL-CCNC: 16 U/L (ref 10–50)
ANION GAP SERPL CALCULATED.3IONS-SCNC: 22 MMOL/L (ref 8–16)
AST SERPL-CCNC: 26 U/L (ref 10–50)
BASOPHILS # BLD: 0.1 K/UL (ref 0–0.2)
BASOPHILS NFR BLD: 0.6 % (ref 0–1)
BILIRUB SERPL-MCNC: 0.6 MG/DL (ref 0.2–1.2)
BILIRUB UR QL STRIP: NEGATIVE
BUN SERPL-MCNC: 26 MG/DL (ref 8–23)
CALCIUM SERPL-MCNC: 8.9 MG/DL (ref 8.8–10.2)
CHLORIDE SERPL-SCNC: 89 MMOL/L (ref 98–107)
CLARITY UR: ABNORMAL
CO2 SERPL-SCNC: 22 MMOL/L (ref 22–29)
COLOR UR: ABNORMAL
CREAT SERPL-MCNC: 3.7 MG/DL (ref 0.7–1.2)
EOSINOPHIL # BLD: 0.1 K/UL (ref 0–0.6)
EOSINOPHIL NFR BLD: 0.6 % (ref 0–5)
ERYTHROCYTE [DISTWIDTH] IN BLOOD BY AUTOMATED COUNT: 14.8 % (ref 11.5–14.5)
GLUCOSE BLD-MCNC: 276 MG/DL (ref 70–99)
GLUCOSE SERPL-MCNC: 343 MG/DL (ref 70–99)
GLUCOSE UR STRIP.AUTO-MCNC: NEGATIVE MG/DL
HCT VFR BLD AUTO: 36.9 % (ref 42–52)
HGB BLD-MCNC: 12.6 G/DL (ref 14–18)
HGB UR STRIP.AUTO-MCNC: ABNORMAL MG/L
IMM GRANULOCYTES # BLD: 0.1 K/UL
KETONES UR STRIP.AUTO-MCNC: ABNORMAL MG/DL
LEUKOCYTE ESTERASE UR QL STRIP.AUTO: ABNORMAL
LIPASE SERPL-CCNC: 16 U/L (ref 13–60)
LYMPHOCYTES # BLD: 0.6 K/UL (ref 1.1–4.5)
LYMPHOCYTES NFR BLD: 5.8 % (ref 20–40)
MAGNESIUM SERPL-MCNC: 2.2 MG/DL (ref 1.6–2.4)
MCH RBC QN AUTO: 29.6 PG (ref 27–31)
MCHC RBC AUTO-ENTMCNC: 34.1 G/DL (ref 33–37)
MCV RBC AUTO: 86.6 FL (ref 80–94)
MONOCYTES # BLD: 0.9 K/UL (ref 0–0.9)
MONOCYTES NFR BLD: 8.2 % (ref 0–10)
NEUTROPHILS # BLD: 9.2 K/UL (ref 1.5–7.5)
NEUTS SEG NFR BLD: 84.1 % (ref 50–65)
NITRITE UR QL STRIP.AUTO: NEGATIVE
PERFORMED ON: ABNORMAL
PH UR STRIP.AUTO: 5 [PH] (ref 5–8)
PLATELET # BLD AUTO: 125 K/UL (ref 130–400)
PMV BLD AUTO: 12.1 FL (ref 9.4–12.4)
POTASSIUM SERPL-SCNC: 4 MMOL/L (ref 3.5–5.1)
PROT SERPL-MCNC: 7.4 G/DL (ref 6.4–8.3)
PROT UR STRIP.AUTO-MCNC: 100 MG/DL
RBC # BLD AUTO: 4.26 M/UL (ref 4.7–6.1)
RBC #/AREA URNS HPF: ABNORMAL /HPF (ref 0–2)
SODIUM SERPL-SCNC: 133 MMOL/L (ref 136–145)
SP GR UR STRIP.AUTO: 1.02 (ref 1–1.03)
UROBILINOGEN UR STRIP.AUTO-MCNC: 1 E.U./DL
WBC # BLD AUTO: 10.9 K/UL (ref 4.8–10.8)
WBC #/AREA URNS HPF: ABNORMAL /HPF (ref 0–5)
YEAST #/AREA URNS HPF: PRESENT /HPF

## 2025-06-23 PROCEDURE — 84439 ASSAY OF FREE THYROXINE: CPT

## 2025-06-23 PROCEDURE — 2580000003 HC RX 258: Performed by: STUDENT IN AN ORGANIZED HEALTH CARE EDUCATION/TRAINING PROGRAM

## 2025-06-23 PROCEDURE — 85025 COMPLETE CBC W/AUTO DIFF WBC: CPT

## 2025-06-23 PROCEDURE — 94760 N-INVAS EAR/PLS OXIMETRY 1: CPT

## 2025-06-23 PROCEDURE — 82746 ASSAY OF FOLIC ACID SERUM: CPT

## 2025-06-23 PROCEDURE — G0378 HOSPITAL OBSERVATION PER HR: HCPCS

## 2025-06-23 PROCEDURE — 84443 ASSAY THYROID STIM HORMONE: CPT

## 2025-06-23 PROCEDURE — 83690 ASSAY OF LIPASE: CPT

## 2025-06-23 PROCEDURE — 36415 COLL VENOUS BLD VENIPUNCTURE: CPT

## 2025-06-23 PROCEDURE — 80053 COMPREHEN METABOLIC PANEL: CPT

## 2025-06-23 PROCEDURE — 83735 ASSAY OF MAGNESIUM: CPT

## 2025-06-23 PROCEDURE — 72170 X-RAY EXAM OF PELVIS: CPT

## 2025-06-23 PROCEDURE — 72125 CT NECK SPINE W/O DYE: CPT

## 2025-06-23 PROCEDURE — 82607 VITAMIN B-12: CPT

## 2025-06-23 PROCEDURE — 70450 CT HEAD/BRAIN W/O DYE: CPT

## 2025-06-23 PROCEDURE — 93005 ELECTROCARDIOGRAM TRACING: CPT | Performed by: STUDENT IN AN ORGANIZED HEALTH CARE EDUCATION/TRAINING PROGRAM

## 2025-06-23 PROCEDURE — 96374 THER/PROPH/DIAG INJ IV PUSH: CPT

## 2025-06-23 PROCEDURE — 99285 EMERGENCY DEPT VISIT HI MDM: CPT

## 2025-06-23 PROCEDURE — 71045 X-RAY EXAM CHEST 1 VIEW: CPT

## 2025-06-23 PROCEDURE — 96361 HYDRATE IV INFUSION ADD-ON: CPT

## 2025-06-23 PROCEDURE — 82306 VITAMIN D 25 HYDROXY: CPT

## 2025-06-23 PROCEDURE — 81001 URINALYSIS AUTO W/SCOPE: CPT

## 2025-06-23 PROCEDURE — 87077 CULTURE AEROBIC IDENTIFY: CPT

## 2025-06-23 PROCEDURE — 6360000002 HC RX W HCPCS: Performed by: STUDENT IN AN ORGANIZED HEALTH CARE EDUCATION/TRAINING PROGRAM

## 2025-06-23 PROCEDURE — 87086 URINE CULTURE/COLONY COUNT: CPT

## 2025-06-23 RX ORDER — NORTRIPTYLINE HYDROCHLORIDE 10 MG/1
10 CAPSULE ORAL NIGHTLY
Status: DISCONTINUED | OUTPATIENT
Start: 2025-06-23 | End: 2025-06-27 | Stop reason: HOSPADM

## 2025-06-23 RX ORDER — SODIUM CHLORIDE 0.9 % (FLUSH) 0.9 %
5-40 SYRINGE (ML) INJECTION PRN
Status: DISCONTINUED | OUTPATIENT
Start: 2025-06-23 | End: 2025-06-27 | Stop reason: HOSPADM

## 2025-06-23 RX ORDER — 0.9 % SODIUM CHLORIDE 0.9 %
500 INTRAVENOUS SOLUTION INTRAVENOUS ONCE
Status: COMPLETED | OUTPATIENT
Start: 2025-06-23 | End: 2025-06-23

## 2025-06-23 RX ORDER — DEXTROSE MONOHYDRATE 25 G/50ML
25 INJECTION, SOLUTION INTRAVENOUS PRN
Status: DISCONTINUED | OUTPATIENT
Start: 2025-06-23 | End: 2025-06-27 | Stop reason: HOSPADM

## 2025-06-23 RX ORDER — SODIUM CHLORIDE 9 MG/ML
INJECTION, SOLUTION INTRAVENOUS PRN
Status: DISCONTINUED | OUTPATIENT
Start: 2025-06-23 | End: 2025-06-27 | Stop reason: HOSPADM

## 2025-06-23 RX ORDER — FAMOTIDINE 20 MG/1
20 TABLET, FILM COATED ORAL 2 TIMES DAILY
Status: DISCONTINUED | OUTPATIENT
Start: 2025-06-23 | End: 2025-06-24 | Stop reason: DRUGHIGH

## 2025-06-23 RX ORDER — AZELASTINE 1 MG/ML
2 SPRAY, METERED NASAL 2 TIMES DAILY PRN
Status: DISCONTINUED | OUTPATIENT
Start: 2025-06-23 | End: 2025-06-24 | Stop reason: CLARIF

## 2025-06-23 RX ORDER — ACETAMINOPHEN 650 MG/1
650 SUPPOSITORY RECTAL EVERY 4 HOURS PRN
Status: DISCONTINUED | OUTPATIENT
Start: 2025-06-23 | End: 2025-06-27 | Stop reason: HOSPADM

## 2025-06-23 RX ORDER — TAMSULOSIN HYDROCHLORIDE 0.4 MG/1
0.4 CAPSULE ORAL DAILY
Status: DISCONTINUED | OUTPATIENT
Start: 2025-06-24 | End: 2025-06-27 | Stop reason: HOSPADM

## 2025-06-23 RX ORDER — DOCUSATE SODIUM 100 MG/1
100 CAPSULE, LIQUID FILLED ORAL 2 TIMES DAILY
Status: DISCONTINUED | OUTPATIENT
Start: 2025-06-23 | End: 2025-06-27 | Stop reason: HOSPADM

## 2025-06-23 RX ORDER — BUSPIRONE HYDROCHLORIDE 10 MG/1
10 TABLET ORAL 3 TIMES DAILY PRN
Status: DISCONTINUED | OUTPATIENT
Start: 2025-06-23 | End: 2025-06-27 | Stop reason: HOSPADM

## 2025-06-23 RX ORDER — PANTOPRAZOLE SODIUM 40 MG/1
40 TABLET, DELAYED RELEASE ORAL
Status: DISCONTINUED | OUTPATIENT
Start: 2025-06-24 | End: 2025-06-27 | Stop reason: HOSPADM

## 2025-06-23 RX ORDER — METOPROLOL SUCCINATE 25 MG/1
25 TABLET, EXTENDED RELEASE ORAL DAILY
Status: DISCONTINUED | OUTPATIENT
Start: 2025-06-24 | End: 2025-06-27 | Stop reason: HOSPADM

## 2025-06-23 RX ORDER — FERROUS SULFATE 325(65) MG
325 TABLET ORAL 2 TIMES DAILY
Status: DISCONTINUED | OUTPATIENT
Start: 2025-06-23 | End: 2025-06-27 | Stop reason: HOSPADM

## 2025-06-23 RX ORDER — ONDANSETRON 4 MG/1
4 TABLET, ORALLY DISINTEGRATING ORAL EVERY 8 HOURS PRN
Status: DISCONTINUED | OUTPATIENT
Start: 2025-06-23 | End: 2025-06-27 | Stop reason: HOSPADM

## 2025-06-23 RX ORDER — ALBUTEROL SULFATE 0.83 MG/ML
2.5 SOLUTION RESPIRATORY (INHALATION) EVERY 4 HOURS PRN
Status: DISCONTINUED | OUTPATIENT
Start: 2025-06-23 | End: 2025-06-27 | Stop reason: HOSPADM

## 2025-06-23 RX ORDER — MECOBALAMIN 5000 MCG
5 TABLET,DISINTEGRATING ORAL NIGHTLY PRN
Status: DISCONTINUED | OUTPATIENT
Start: 2025-06-24 | End: 2025-06-27 | Stop reason: HOSPADM

## 2025-06-23 RX ORDER — SODIUM CHLORIDE 0.9 % (FLUSH) 0.9 %
5-40 SYRINGE (ML) INJECTION EVERY 12 HOURS SCHEDULED
Status: DISCONTINUED | OUTPATIENT
Start: 2025-06-23 | End: 2025-06-27 | Stop reason: HOSPADM

## 2025-06-23 RX ORDER — ONDANSETRON 2 MG/ML
4 INJECTION INTRAMUSCULAR; INTRAVENOUS EVERY 6 HOURS PRN
Status: DISCONTINUED | OUTPATIENT
Start: 2025-06-23 | End: 2025-06-27 | Stop reason: HOSPADM

## 2025-06-23 RX ORDER — ROSUVASTATIN CALCIUM 10 MG/1
10 TABLET, COATED ORAL NIGHTLY
Status: DISCONTINUED | OUTPATIENT
Start: 2025-06-23 | End: 2025-06-27 | Stop reason: HOSPADM

## 2025-06-23 RX ORDER — CALCITRIOL 0.25 UG/1
0.25 CAPSULE, LIQUID FILLED ORAL DAILY
Status: DISCONTINUED | OUTPATIENT
Start: 2025-06-24 | End: 2025-06-27 | Stop reason: HOSPADM

## 2025-06-23 RX ORDER — MIDODRINE HYDROCHLORIDE 5 MG/1
5 TABLET ORAL
Status: DISCONTINUED | OUTPATIENT
Start: 2025-06-24 | End: 2025-06-25

## 2025-06-23 RX ORDER — CALCIUM CARBONATE 500 MG/1
1000 TABLET, CHEWABLE ORAL 3 TIMES DAILY PRN
Status: DISCONTINUED | OUTPATIENT
Start: 2025-06-23 | End: 2025-06-27 | Stop reason: HOSPADM

## 2025-06-23 RX ORDER — ESCITALOPRAM OXALATE 10 MG/1
10 TABLET ORAL DAILY
Status: DISCONTINUED | OUTPATIENT
Start: 2025-06-24 | End: 2025-06-27 | Stop reason: HOSPADM

## 2025-06-23 RX ORDER — DONEPEZIL HYDROCHLORIDE 10 MG/1
10 TABLET, FILM COATED ORAL DAILY
Status: DISCONTINUED | OUTPATIENT
Start: 2025-06-24 | End: 2025-06-27 | Stop reason: HOSPADM

## 2025-06-23 RX ORDER — ACETAMINOPHEN 325 MG/1
650 TABLET ORAL EVERY 4 HOURS PRN
Status: DISCONTINUED | OUTPATIENT
Start: 2025-06-23 | End: 2025-06-27 | Stop reason: HOSPADM

## 2025-06-23 RX ORDER — INSULIN LISPRO 100 [IU]/ML
0-8 INJECTION, SOLUTION INTRAVENOUS; SUBCUTANEOUS
Status: DISCONTINUED | OUTPATIENT
Start: 2025-06-23 | End: 2025-06-27 | Stop reason: HOSPADM

## 2025-06-23 RX ORDER — FLUTICASONE PROPIONATE 50 MCG
2 SPRAY, SUSPENSION (ML) NASAL DAILY PRN
Status: DISCONTINUED | OUTPATIENT
Start: 2025-06-23 | End: 2025-06-27 | Stop reason: HOSPADM

## 2025-06-23 RX ORDER — SODIUM HYPOCHLORITE 1.25 MG/ML
1 SOLUTION TOPICAL 2 TIMES DAILY
Status: DISCONTINUED | OUTPATIENT
Start: 2025-06-23 | End: 2025-06-27 | Stop reason: HOSPADM

## 2025-06-23 RX ORDER — ERGOCALCIFEROL 1.25 MG/1
50000 CAPSULE, LIQUID FILLED ORAL WEEKLY
Status: DISCONTINUED | OUTPATIENT
Start: 2025-06-30 | End: 2025-06-27 | Stop reason: HOSPADM

## 2025-06-23 RX ORDER — ISOSORBIDE DINITRATE 10 MG/1
20 TABLET ORAL 3 TIMES DAILY
Status: DISCONTINUED | OUTPATIENT
Start: 2025-06-24 | End: 2025-06-27 | Stop reason: HOSPADM

## 2025-06-23 RX ORDER — LORAZEPAM 2 MG/ML
2 INJECTION INTRAMUSCULAR EVERY 5 MIN PRN
Status: DISCONTINUED | OUTPATIENT
Start: 2025-06-23 | End: 2025-06-27 | Stop reason: HOSPADM

## 2025-06-23 RX ORDER — INSULIN GLARGINE 100 [IU]/ML
20 INJECTION, SOLUTION SUBCUTANEOUS NIGHTLY
Status: DISCONTINUED | OUTPATIENT
Start: 2025-06-23 | End: 2025-06-27 | Stop reason: HOSPADM

## 2025-06-23 RX ORDER — MORPHINE SULFATE 4 MG/ML
4 INJECTION, SOLUTION INTRAMUSCULAR; INTRAVENOUS ONCE
Status: COMPLETED | OUTPATIENT
Start: 2025-06-23 | End: 2025-06-23

## 2025-06-23 RX ORDER — HEPARIN SODIUM 5000 [USP'U]/ML
5000 INJECTION, SOLUTION INTRAVENOUS; SUBCUTANEOUS EVERY 8 HOURS SCHEDULED
Status: CANCELLED | OUTPATIENT
Start: 2025-06-23

## 2025-06-23 RX ORDER — POLYETHYLENE GLYCOL 3350 17 G/17G
17 POWDER, FOR SOLUTION ORAL 2 TIMES DAILY PRN
Status: DISCONTINUED | OUTPATIENT
Start: 2025-06-23 | End: 2025-06-27 | Stop reason: HOSPADM

## 2025-06-23 RX ADMIN — MORPHINE SULFATE 4 MG: 4 INJECTION, SOLUTION INTRAMUSCULAR; INTRAVENOUS at 21:21

## 2025-06-23 RX ADMIN — SODIUM CHLORIDE 500 ML: 0.9 INJECTION, SOLUTION INTRAVENOUS at 20:03

## 2025-06-23 ASSESSMENT — PAIN SCALES - GENERAL: PAINLEVEL_OUTOF10: 0

## 2025-06-23 ASSESSMENT — ENCOUNTER SYMPTOMS
CHEST TIGHTNESS: 0
BLOOD IN STOOL: 0
ABDOMINAL PAIN: 0
EYE REDNESS: 0
NAUSEA: 1
DIARRHEA: 0
COUGH: 0
EYE PAIN: 0
VOMITING: 1
SHORTNESS OF BREATH: 0
SORE THROAT: 0

## 2025-06-23 ASSESSMENT — PAIN - FUNCTIONAL ASSESSMENT: PAIN_FUNCTIONAL_ASSESSMENT: 0-10

## 2025-06-24 LAB
25(OH)D3 SERPL-MCNC: 35.1 NG/ML
ANION GAP SERPL CALCULATED.3IONS-SCNC: 16 MMOL/L (ref 8–16)
BASOPHILS # BLD: 0.1 K/UL (ref 0–0.2)
BASOPHILS NFR BLD: 0.8 % (ref 0–1)
BUN SERPL-MCNC: 29 MG/DL (ref 8–23)
CALCIUM SERPL-MCNC: 9.1 MG/DL (ref 8.8–10.2)
CHLORIDE SERPL-SCNC: 92 MMOL/L (ref 98–107)
CO2 SERPL-SCNC: 25 MMOL/L (ref 22–29)
CREAT SERPL-MCNC: 4.2 MG/DL (ref 0.7–1.2)
EKG P AXIS: 92 DEGREES
EKG P-R INTERVAL: 216 MS
EKG Q-T INTERVAL: 354 MS
EKG QRS DURATION: 146 MS
EKG QTC CALCULATION (BAZETT): 443 MS
EKG T AXIS: 91 DEGREES
EOSINOPHIL # BLD: 0 K/UL (ref 0–0.6)
EOSINOPHIL NFR BLD: 0.1 % (ref 0–5)
ERYTHROCYTE [DISTWIDTH] IN BLOOD BY AUTOMATED COUNT: 14.9 % (ref 11.5–14.5)
FOLATE SERPL-MCNC: 23.1 NG/ML (ref 4.5–32.2)
GLUCOSE BLD-MCNC: 115 MG/DL (ref 70–99)
GLUCOSE BLD-MCNC: 183 MG/DL
GLUCOSE BLD-MCNC: 183 MG/DL (ref 70–99)
GLUCOSE BLD-MCNC: 202 MG/DL
GLUCOSE BLD-MCNC: 202 MG/DL (ref 70–99)
GLUCOSE BLD-MCNC: 253 MG/DL (ref 70–99)
GLUCOSE SERPL-MCNC: 293 MG/DL (ref 70–99)
HCT VFR BLD AUTO: 34.9 % (ref 42–52)
HGB BLD-MCNC: 11.9 G/DL (ref 14–18)
IMM GRANULOCYTES # BLD: 0.1 K/UL
LYMPHOCYTES # BLD: 0.9 K/UL (ref 1.1–4.5)
LYMPHOCYTES NFR BLD: 9.7 % (ref 20–40)
MCH RBC QN AUTO: 29.5 PG (ref 27–31)
MCHC RBC AUTO-ENTMCNC: 34.1 G/DL (ref 33–37)
MCV RBC AUTO: 86.4 FL (ref 80–94)
MONOCYTES # BLD: 0.6 K/UL (ref 0–0.9)
MONOCYTES NFR BLD: 7 % (ref 0–10)
NEUTROPHILS # BLD: 7.1 K/UL (ref 1.5–7.5)
NEUTS SEG NFR BLD: 81.7 % (ref 50–65)
PERFORMED ON: ABNORMAL
PLATELET # BLD AUTO: 122 K/UL (ref 130–400)
PMV BLD AUTO: 12.7 FL (ref 9.4–12.4)
POTASSIUM SERPL-SCNC: 4.2 MMOL/L (ref 3.5–5)
RBC # BLD AUTO: 4.04 M/UL (ref 4.7–6.1)
SODIUM SERPL-SCNC: 133 MMOL/L (ref 136–145)
T4 FREE SERPL-MCNC: 1.24 NG/DL (ref 0.93–1.7)
TSH SERPL DL<=0.005 MIU/L-ACNC: 1.6 UIU/ML (ref 0.27–4.2)
VIT B12 SERPL-MCNC: 749 PG/ML (ref 232–1245)
WBC # BLD AUTO: 8.7 K/UL (ref 4.8–10.8)

## 2025-06-24 PROCEDURE — 36415 COLL VENOUS BLD VENIPUNCTURE: CPT

## 2025-06-24 PROCEDURE — 80048 BASIC METABOLIC PNL TOTAL CA: CPT

## 2025-06-24 PROCEDURE — 6370000000 HC RX 637 (ALT 250 FOR IP): Performed by: HOSPITALIST

## 2025-06-24 PROCEDURE — 85025 COMPLETE CBC W/AUTO DIFF WBC: CPT

## 2025-06-24 PROCEDURE — 2500000003 HC RX 250 WO HCPCS: Performed by: HOSPITALIST

## 2025-06-24 PROCEDURE — 82962 GLUCOSE BLOOD TEST: CPT

## 2025-06-24 PROCEDURE — 02HV33Z INSERTION OF INFUSION DEVICE INTO SUPERIOR VENA CAVA, PERCUTANEOUS APPROACH: ICD-10-PCS | Performed by: HOSPITALIST

## 2025-06-24 PROCEDURE — G0378 HOSPITAL OBSERVATION PER HR: HCPCS

## 2025-06-24 PROCEDURE — 96361 HYDRATE IV INFUSION ADD-ON: CPT

## 2025-06-24 PROCEDURE — 2580000003 HC RX 258: Performed by: INTERNAL MEDICINE

## 2025-06-24 PROCEDURE — 99223 1ST HOSP IP/OBS HIGH 75: CPT | Performed by: PSYCHIATRY & NEUROLOGY

## 2025-06-24 PROCEDURE — 93010 ELECTROCARDIOGRAM REPORT: CPT | Performed by: INTERNAL MEDICINE

## 2025-06-24 RX ORDER — FAMOTIDINE 20 MG/1
20 TABLET, FILM COATED ORAL NIGHTLY
Status: DISCONTINUED | OUTPATIENT
Start: 2025-06-24 | End: 2025-06-27 | Stop reason: HOSPADM

## 2025-06-24 RX ORDER — PREGABALIN 50 MG/1
50 CAPSULE ORAL 2 TIMES DAILY
COMMUNITY

## 2025-06-24 RX ORDER — BISMUTH TRIBROMOPH/PETROLATUM 5"X9"
1 BANDAGE TOPICAL DAILY
Status: DISCONTINUED | OUTPATIENT
Start: 2025-06-24 | End: 2025-06-27 | Stop reason: HOSPADM

## 2025-06-24 RX ORDER — OXYCODONE HYDROCHLORIDE 10 MG/1
10 TABLET ORAL ONCE
Refills: 0 | Status: COMPLETED | OUTPATIENT
Start: 2025-06-24 | End: 2025-06-24

## 2025-06-24 RX ORDER — SODIUM CHLORIDE 9 MG/ML
INJECTION, SOLUTION INTRAVENOUS CONTINUOUS
Status: DISCONTINUED | OUTPATIENT
Start: 2025-06-24 | End: 2025-06-25

## 2025-06-24 RX ORDER — HYDROCODONE BITARTRATE AND ACETAMINOPHEN 5; 325 MG/1; MG/1
1 TABLET ORAL EVERY 12 HOURS PRN
Status: DISCONTINUED | OUTPATIENT
Start: 2025-06-24 | End: 2025-06-27 | Stop reason: HOSPADM

## 2025-06-24 RX ADMIN — APIXABAN 5 MG: 5 TABLET, FILM COATED ORAL at 08:45

## 2025-06-24 RX ADMIN — PANTOPRAZOLE SODIUM 40 MG: 40 TABLET, DELAYED RELEASE ORAL at 07:33

## 2025-06-24 RX ADMIN — APIXABAN 5 MG: 5 TABLET, FILM COATED ORAL at 20:58

## 2025-06-24 RX ADMIN — INSULIN GLARGINE 20 UNITS: 100 INJECTION, SOLUTION SUBCUTANEOUS at 00:12

## 2025-06-24 RX ADMIN — APIXABAN 5 MG: 5 TABLET, FILM COATED ORAL at 00:12

## 2025-06-24 RX ADMIN — ROSUVASTATIN 10 MG: 10 TABLET, FILM COATED ORAL at 20:58

## 2025-06-24 RX ADMIN — INSULIN LISPRO 4 UNITS: 100 INJECTION, SOLUTION INTRAVENOUS; SUBCUTANEOUS at 00:13

## 2025-06-24 RX ADMIN — INSULIN GLARGINE 20 UNITS: 100 INJECTION, SOLUTION SUBCUTANEOUS at 21:19

## 2025-06-24 RX ADMIN — FERROUS SULFATE TAB 325 MG (65 MG ELEMENTAL FE) 325 MG: 325 (65 FE) TAB at 07:32

## 2025-06-24 RX ADMIN — NORTRIPTYLINE HYDROCHLORIDE 10 MG: 10 CAPSULE ORAL at 20:58

## 2025-06-24 RX ADMIN — CALCITRIOL CAPSULES 0.25 MCG 0.25 MCG: 0.25 CAPSULE ORAL at 07:30

## 2025-06-24 RX ADMIN — SODIUM CHLORIDE: 0.9 INJECTION, SOLUTION INTRAVENOUS at 11:15

## 2025-06-24 RX ADMIN — FAMOTIDINE 20 MG: 20 TABLET, FILM COATED ORAL at 20:58

## 2025-06-24 RX ADMIN — Medication 1 EACH: at 12:58

## 2025-06-24 RX ADMIN — HYDROCODONE BITARTRATE AND ACETAMINOPHEN 1 TABLET: 5; 325 TABLET ORAL at 12:07

## 2025-06-24 RX ADMIN — MIDODRINE HYDROCHLORIDE 5 MG: 2.5 TABLET ORAL at 18:17

## 2025-06-24 RX ADMIN — INSULIN LISPRO 4 UNITS: 100 INJECTION, SOLUTION INTRAVENOUS; SUBCUTANEOUS at 21:19

## 2025-06-24 RX ADMIN — FAMOTIDINE 20 MG: 20 TABLET, FILM COATED ORAL at 00:12

## 2025-06-24 RX ADMIN — TAMSULOSIN HYDROCHLORIDE 0.4 MG: 0.4 CAPSULE ORAL at 08:45

## 2025-06-24 RX ADMIN — ROSUVASTATIN 10 MG: 10 TABLET, FILM COATED ORAL at 00:12

## 2025-06-24 RX ADMIN — DOCUSATE SODIUM 100 MG: 100 CAPSULE, LIQUID FILLED ORAL at 20:58

## 2025-06-24 RX ADMIN — INSULIN LISPRO 2 UNITS: 100 INJECTION, SOLUTION INTRAVENOUS; SUBCUTANEOUS at 07:58

## 2025-06-24 RX ADMIN — NORTRIPTYLINE HYDROCHLORIDE 10 MG: 10 CAPSULE ORAL at 00:12

## 2025-06-24 RX ADMIN — FERROUS SULFATE TAB 325 MG (65 MG ELEMENTAL FE) 325 MG: 325 (65 FE) TAB at 20:58

## 2025-06-24 RX ADMIN — ESCITALOPRAM OXALATE 10 MG: 10 TABLET ORAL at 07:33

## 2025-06-24 RX ADMIN — DAKIN'S SOLUTION 0.125% (QUARTER STRENGTH) 1 APPLICATION: 0.12 SOLUTION at 08:46

## 2025-06-24 RX ADMIN — FERROUS SULFATE TAB 325 MG (65 MG ELEMENTAL FE) 325 MG: 325 (65 FE) TAB at 00:12

## 2025-06-24 RX ADMIN — ISOSORBIDE DINITRATE 20 MG: 10 TABLET ORAL at 07:30

## 2025-06-24 RX ADMIN — SODIUM CHLORIDE, PRESERVATIVE FREE 10 ML: 5 INJECTION INTRAVENOUS at 00:16

## 2025-06-24 RX ADMIN — ACETAMINOPHEN 650 MG: 325 TABLET ORAL at 11:15

## 2025-06-24 RX ADMIN — MIDODRINE HYDROCHLORIDE 5 MG: 2.5 TABLET ORAL at 07:32

## 2025-06-24 RX ADMIN — OXYCODONE HYDROCHLORIDE 10 MG: 10 TABLET ORAL at 02:23

## 2025-06-24 RX ADMIN — MIDODRINE HYDROCHLORIDE 5 MG: 2.5 TABLET ORAL at 11:15

## 2025-06-24 RX ADMIN — DOCUSATE SODIUM 100 MG: 100 CAPSULE, LIQUID FILLED ORAL at 07:30

## 2025-06-24 RX ADMIN — INSULIN LISPRO 2 UNITS: 100 INJECTION, SOLUTION INTRAVENOUS; SUBCUTANEOUS at 11:20

## 2025-06-24 RX ADMIN — DONEPEZIL HYDROCHLORIDE 10 MG: 10 TABLET ORAL at 07:32

## 2025-06-24 RX ADMIN — DAKIN'S SOLUTION 0.125% (QUARTER STRENGTH) 1 APPLICATION: 0.12 SOLUTION at 00:16

## 2025-06-24 RX ADMIN — METOPROLOL SUCCINATE 25 MG: 25 TABLET, EXTENDED RELEASE ORAL at 07:31

## 2025-06-24 RX ADMIN — DOCUSATE SODIUM 100 MG: 100 CAPSULE, LIQUID FILLED ORAL at 00:12

## 2025-06-24 ASSESSMENT — PAIN SCALES - GENERAL
PAINLEVEL_OUTOF10: 7
PAINLEVEL_OUTOF10: 7

## 2025-06-24 NOTE — CONSULTS
Renal Consult Note    Thank you to requesting provider: Dr Fairchild, for asking us to see Cholo Bhakta    Reason for consultation: ESRD    Chief Complaint: AMS     History of Presenting Illness      Patient is a 70-year-old a pleasant man with a past medical history of CHF, hypertension, type 2 diabetes, CAD, morbid obesity, and ESRD.  He has recently started chronic maintenance hemodialysis.  In [id number], he was status post dialysis on June 23.  Patient reported hypotension, fatigue and headaches after dialysis.  He reported on June 23 to the ED, after he sustained an episode of seizure with change in mental status.  He even had a fall without any major head trauma.  He has generalized weakness.  Renal service was consulted to manage his ESRD.    Past Medical/Surgical History      Active Ambulatory Problems     Diagnosis Date Noted    Systolic CHF, chronic ([facility]) 02/21/2025    Volume overload 02/21/2025    ESRD (end stage renal disease) on dialysis ([facility]) 02/21/2025    Cardiorenal syndrome 02/21/2025    Chronic obstructive pulmonary disease ([facility]) 12/03/2024    Essential hypertension 05/03/2017    Diabetes mellitus ([facility]) 02/22/2025    CAD (coronary artery disease) 01/20/2023    Diabetic ulcer of left foot associated with type 2 diabetes mellitus ([facility]) 08/31/2020    Gout 03/06/2025    Memory loss 03/06/2025    GERD (gastroesophageal reflux disease) 03/06/2025    BPH (benign prostatic hyperplasia) 03/06/2025    Neuropathy 03/06/2025    Palliative care patient 03/07/2025    Severe malnutrition 06/03/2025    Fall 06/03/2025    General weakness 06/23/2025    Atrial fibrillation (HCC) 05/06/2022    Chronic constipation 10/06/2020     Resolved Ambulatory Problems     Diagnosis Date Noted    Acute on chronic systolic heart failure (HCC) 02/21/2025    Acute on chronic congestive heart failure, unspecified heart failure type ([facility]) 03/06/2025    Acute on chronic systolic CHF (congestive heart failure) ([facility]) 04/02/2025

## 2025-06-24 NOTE — PROGRESS NOTES
Progress Note    Date:2025       Room:  Patient Name:Cholo Bhakta     YOB: 1955     Age:70 y.o.      Subjective    Subjective: 70 year old male with CHF, ESRD on dialysis, diabetes, hyperlipidemia, HTN who presents to ED for further evaluation following a fall.  Wife is present at bedside who reports that when they arrived home from his dialysis treatment patient complained of a headache and fell getting out of his truck. EMS was called to assist in getting patient up and when they arrived patient had a seizure. In ED: EKG with sinus tachycardia ; XR pelvis with no visible fractures; CXR with no acute cardiopulmonary disease; CT cervical spine with no acute fracture; CT head with no acute intracranial findings; UA with moderate LE, present yeast, 31-50 WBC, urine culture pending. Admitted in house for neuro and nephro eval.         Review of Systems: 6 point system reviewed and negative except as stated above.    Objective         Vitals Last 24 Hours:  TEMPERATURE:  Temp  Av.2 °F (37.3 °C)  Min: 99.2 °F (37.3 °C)  Max: 99.2 °F (37.3 °C)  RESPIRATIONS RANGE: Resp  Av.4  Min: 12  Max: 25  PULSE OXIMETRY RANGE: SpO2  Av.1 %  Min: 90 %  Max: 100 %  PULSE RANGE: Pulse  Av.4  Min: 72  Max: 116  BLOOD PRESSURE RANGE: Systolic (24hrs), Av , Min:86 , Max:118   ; Diastolic (24hrs), Av, Min:55, Max:91    I/O (24Hr):    Intake/Output Summary (Last 24 hours) at 2025 1513  Last data filed at 2025 0016  Gross per 24 hour   Intake 10 ml   Output 400 ml   Net -390 ml       Physical Examination:  General: Well-developed, no acute distress lying comfortably in bed.  HEENT: Atraumatic normocephalic, range of motion normal, no JVD, no tracheal deviation noted.  Cardiac: Normal S1-S2   Respiratory: clear To auscultation bilaterally, no rhonchi or rales, no wheezing  Abdomen: Soft, positive bowel sounds in all quadrants, no distention, nontender to palpation, no rebound  noted.    extremities: chronic LE wounds, reviewed in media, moves all extremities  Psych: Affect normal and good eye contact, behavioral normal.        Labs/Imaging/Diagnostics    Labs:  CBC:  Recent Labs     06/23/25 1900 06/24/25  0010   WBC 10.9* 8.7   RBC 4.26* 4.04*   HGB 12.6* 11.9*   HCT 36.9* 34.9*   MCV 86.6 86.4   RDW 14.8* 14.9*   * 122*     CHEMISTRIES:  Recent Labs     06/23/25  1900 06/24/25  0010 06/24/25  0751 06/24/25  1111   * 133*  --   --    K 4.0 4.2  --   --    CL 89* 92*  --   --    CO2 22 25  --   --    BUN 26* 29*  --   --    CREATININE 3.7* 4.2*  --   --    GLUCOSE 343* 293* 202 183   MG 2.2  --   --   --      PT/INR:No results for input(s): \"PROTIME\", \"INR\" in the last 72 hours.  APTT:No results for input(s): \"APTT\" in the last 72 hours.  LIVER PROFILE:  Recent Labs     06/23/25  1900   AST 26   ALT 16   BILITOT 0.6   ALKPHOS 128       Imaging Last 24 Hours:  CT CERVICAL SPINE WO CONTRAST  Result Date: 6/23/2025    EXAM: CT CERVICAL SPINE WITHOUT CONTRAST.  HISTORY:   Fall  COMPARISON: 06/10/2025  TECHNIQUE:  Serial axial images of the cervical spine were obtained from the skull base through the lung apices without contrast.  These were viewed in multiple planes.  FINDINGS:  Straightening of the cervical lordosis, positioning versus spasm.  Vertebral body heights are maintained.  No discrete fracture line.  No prevertebral soft tissue edema.  Multilevel degenerative changes.  Postsurgical changes, stable.       No acute fracture.  If symptoms persist, or if there is concern for occult injury, consider follow-up MRI.  All CT scans are performed using dose optimization techniques as appropriate to the performed exam and include at least one of the following: Automated exposure control, adjustment of the mA and/or kV according to size, and the use of iterative reconstruction technique.  ______________________________________ Electronically signed by: SINAI HUSAIN D.O. Date:

## 2025-06-24 NOTE — ED NOTES
This RN notified Clinical house of the need for a dolphin bed for Pt due to high risk of skin break down.

## 2025-06-24 NOTE — ED PROVIDER NOTES
[facility] EMERGENCY DEPARTMENT  eMERGENCY dEPARTMENT eNCOUnter      Pt Name: Cholo Bhakta  MRN: [id number]  Birthdate 1955  Date of evaluation: 6/23/2025  Provider: Sarah Beth Adam MD    CHIEF COMPLAINT       Chief Complaint   Patient presents with    Fall     Pt had dialysis today and when he got home from dialysis he had a fall. Pt is tachy, has a skin tear on R arm, and had a seizure two weeks ago. Pt started dialysis two weeks ago.         HISTORY OF PRESENT ILLNESS   (Location/Symptom, Timing/Onset,Context/Setting, Quality, Duration, Modifying Factors, Severity)  Note limiting factors.     HPI    Cholo Bhakta is a 70 y.o. male with PMH of ESRD on hemodialysis, systolic CHF, CAD, insulin-dependent diabetes, GERD, BPH, hypertension, cardiorenal syndrome who presents to the emergency department with CC of fall.  Patient got home from dialysis today and fell sustaining a skin tear on his right arm.  He is unsure how he fell.  States he has not felt well for the past month.  He had a brief seizure-like episode 2 weeks ago after dialysis, was evaluated by neurology, had a CT head and EEG which were unremarkable and they did not start any antiepileptics.  He has not had any more episodes of seizure since then.  He is unsure how he fell, does not know if he lost his balance or passed out.  He has noted to be in A-fib with rate in the 110s.  He states he has had generalized malaise with some nausea and vomiting over the last month.  States he did not hit his head during the fall and states he landed on his bottom.        NursingNotes were reviewed.    REVIEW OF SYSTEMS    (2-9 systems for level 4, 10 or more for level 5)     Review of Systems   Constitutional:  Positive for fatigue. Negative for appetite change, chills and fever.   HENT:  Negative for congestion and sore throat.    Eyes:  Negative for pain and redness.   Respiratory:  Negative for cough, chest tightness and shortness of breath.    Cardiovascular:   06/23/2025 23:50, Cancelled: Added on to original order #.   VITAMIN D 25 HYDROXY   TSH   T4, FREE   VITAMIN B12   POCT GLUCOSE   POCT GLUCOSE       All other labs were within normal range or not returned as of this dictation.    EMERGENCY DEPARTMENT COURSE and DIFFERENTIAL DIAGNOSIS/MDM:   Vitals:    Vitals:    06/24/25 0000 06/24/25 0030 06/24/25 0100 06/24/25 0200   BP: 102/65 98/63 94/77 90/66   Pulse: 96 91 91 92   Resp: 19 18 16 25   Temp:       TempSrc:       SpO2: 96% 96% 95% 96%   Weight:       Height:           MDM      Reassessment    Patient is a 70 y.o. male presenting with fall, generalized weakness, generalized malaise.    Lab work is overall stable with mildly elevated creatinine and glucose.  CT head, CT C-spine, chest x-ray, and pelvis x-ray do not show any acute traumatic injuries.  Patient does remain altered, has a recent history of seizures but not started on seizure meds and not exhibiting any seizure-like activity today but does have a mild tremor.  Wife feels that patient is still off his baseline despite reassuring lab work and imaging so will discuss with hospitalist about admission for metabolic encephalopathy.                  CONSULTS:  IP CONSULT TO NEPHROLOGY  IP CONSULT TO NEUROLOGY  IP CONSULT TO DIETITIAN    :  Unless otherwise noted below, none     Procedures    FINAL IMPRESSION      1. Fall, initial encounter    2. Altered mental status, unspecified altered mental status type          DISPOSITION/PLAN   DISPOSITION Admitted 06/23/2025 10:19:55 PM               PATIENT REFERRED TO:  No follow-up provider specified.    DISCHARGE MEDICATIONS:  New Prescriptions    No medications on file          (Please note that portions of this note were completed with a voice recognition program.  Efforts were made to edit thedictations but occasionally words are mis-transcribed.)    Sarah Beth Adam MD (electronically signed)Emergency Physician          Sarah Beth Adam MD  06/24/25 2388

## 2025-06-24 NOTE — PLAN OF CARE
SUBJECTIVE:    ED Doc:  Mr. Cholo Bhakta had gotten back from dialysis when he had an episode of AMS, as per his wife this has been going on for two weeks but got much worse today They related that they have seen neurology but have not yet had a follow up or been able to get  an appointment for a follow up with the. He had a fall but can not recall it, other than to deny having his his head as he states he landed on his posterior. He has also complained of recurrent falls and generalized weakness.       OBJECTIVE:    /62   Pulse (!) 102   Temp 99.2 °F (37.3 °C) (Oral)   Resp 19   Ht 1.829 m (6')   Wt 120.2 kg (265 lb)   SpO2 90%   BMI 35.94 kg/m²       ASSESSMENTS & PLANS:    Generalized Weakness:  PT & OT Evaluations with Txx as indicated  Fall Precautions  Bed Alarm  CM consult for suspected rehab placement needs deferred  Vit B-12 level added on  TSH & FT4 level added on  Vit 25-OH D level added on  RD consultation  Ammonia not checked already as LFTs were WNL  Blood Gas not checked as per RR~18 with SpO2 %  UA is pending result, added a UDS on to that  CT Head and CT Neck and CXR negative for an acute process  BMP with Mag reflex daily  CBC with diff daily    AMS:  Neuro consult tomorrow AM  Tele  Seizure precautions  Prolactin was not available proximal to the event/arrival  CBC with diff daily  BMP with Mag reflex daily    Wounds, Left Foot wound:  Wound Care RN consult  Continue Dakins to L foot as directed  CBC with diff daily    ESRD on HD:  Nephrology consult to be called tomorrow afternoon if unable to get pt d/c, as will need dialysis the next day  Strict Is and Os  Daily Weights  Elevate head of bed  Elevate legs to prevent sliding down in bed  BMP with Mag reflex daily    Chronic Medical Problems: continue home regimen as indicated  Any puffers will be subbed to nebulizers as able  Any oral antihyperglycemics will be subbed to an insulin regimen with POCT scheduled & PRN as well as

## 2025-06-24 NOTE — ED NOTES
ED TO INPATIENT SBAR HANDOFF    Patient Name: Cholo Bhakta   : 1955  70 y.o.   Family/Caregiver Present: Yes  Code Status Order: Prior    C-SSRS: Risk of Suicide: No Risk  Sitter No  Restraints:         Situation  Chief Complaint:   Chief Complaint   Patient presents with    Fall     Pt had dialysis today and when he got home from dialysis he had a fall. Pt is tachy, has a skin tear on R arm, and had a seizure two weeks ago. Pt started dialysis two weeks ago.     Patient Diagnosis: AMS (altered mental status) [R41.82]     Brief Description of Patient's Condition: Cholo Bhakta is a 70 y.o. male with PMH of ESRD on hemodialysis, systolic CHF, CAD, insulin-dependent diabetes, GERD, BPH, hypertension, cardiorenal syndrome who presents to the emergency department with CC of fall.  Patient got home from dialysis today and fell sustaining a skin tear on his right arm.  He is unsure how he fell.  States he has not felt well for the past month.  He had a brief seizure-like episode 2 weeks ago after dialysis, was evaluated by neurology, had a CT head and EEG which were unremarkable and they did not start any antiepileptics.  He has not had any more episodes of seizure since then.  He is unsure how he fell, does not know if he lost his balance or passed out.  He has noted to be in A-fib with rate in the 110s.  He states he has had generalized malaise with some nausea and vomiting over the last month.  States he did not hit his head during the fall and states he landed on his bottom.     In Ed Pt has been A&Ox4 and on RA. Pt has a 20g in the LAC and is unable to ambulate. Pt had dialysis today () and fell afterwards. Pt started dialysis 2 weeks ago. Pt also arrived and was in a-fib on arrival and tachy but has since had a HR from . Pt had 500ml NS and 4mg IVP morphine for a HA. Pt was straight cath in ED and had 400ml UO. Pt also has a wound on his sacrum and left elbow that were present PTA to ED.  Mental

## 2025-06-24 NOTE — CONSULTS
Comprehensive Nutrition Assessment    Type and Reason for Visit:  Initial, Consult    Nutrition Recommendations/Plan:   Follow for advancing diet, po intake, edema, labs     Malnutrition Assessment:  Malnutrition Status:  At risk for malnutrition (06/24/25 0734)    Context:  Acute Illness     Findings of the 6 clinical characteristics of malnutrition:  Energy Intake:  Unable to assess  Weight Loss:  Greater than 7.5% over 3 months     Body Fat Loss:  No body fat loss     Muscle Mass Loss:  No muscle mass loss    Fluid Accumulation:  Unable to assess     Strength:  Not Performed    Nutrition Assessment:    Received consult for General nutrition management, weakness may need increased protein.  At present time pt is NPO.  Dialysis continues.  Pt has lost approx 35# or 11.8% or UBW since 3/6/2025--this is a significant loss.  Glucose 293-343, Accuchek 276, A1c 105 (6/3/24408    Nutrition Related Findings:    edema not documented at this time Wound Type: Stage II, Multiple, Diabetic Ulcer       Current Nutrition Intake & Therapies:    Average Meal Intake: NPO  Average Supplements Intake: NPO  Diet NPO    Anthropometric Measures:  Height: 182.9 cm (6' 0.01\")  Ideal Body Weight (IBW): 178 lbs (81 kg)    Admission Body Weight: 120.2 kg (264 lb 15.9 oz)  Current Body Weight: 120.2 kg (264 lb 15.9 oz), 148.9 % IBW. Weight Source: Stated  Current BMI (kg/m2): 35.9  Usual Body Weight: 136.3 kg (300 lb 7.8 oz) (3/6/2025)  % Weight Change (Calculated): -11.8  Weight Adjustment For: No Adjustment  BMI Categories: Obese Class 2 (BMI 35.0 -39.9)    Estimated Daily Nutrient Needs:  Energy Requirements Based On: Kcal/kg  Weight Used for Energy Requirements: Current  Energy (kcal/day): 4606-8177 kcals (15-18 kcals/kg)  Weight Used for Protein Requirements: Ideal  Protein (g/day): 97-162g  Method Used for Fluid Requirements: Standard renal  Fluid (ml/day): 1000 -1500 ml    Nutrition Diagnosis:   Inadequate oral intake, Altered  nutrition-related lab values related to acute injury/trauma, renal dysfunction, endocrine dysfunction, increase demand for energy/nutrients as evidenced by NPO or clear liquid status due to medical condition, wounds, weight loss, dialysis, lab values, nausea, vomiting    Nutrition Interventions:   Food and/or Nutrient Delivery: Continue NPO  Nutrition Education/Counseling: No recommendation at this time  Coordination of Nutrition Care: Continue to monitor while inpatient       Goals:  Goals: Meet at least 75% of estimated needs, PO intake 50% or greater, Maintain adequate nutrition status  Type of Goal: New goal  Previous Goal Met: New Goal    Nutrition Monitoring and Evaluation:   Behavioral-Environmental Outcomes: None Identified  Food/Nutrient Intake Outcomes: Progression of Nutrition  Physical Signs/Symptoms Outcomes: Biochemical Data, Nausea or Vomiting, Fluid Status or Edema, Weight, Skin    Discharge Planning:    Too soon to determine     Deidre Azevedo MS, RD, LD  Contact: [phone]

## 2025-06-24 NOTE — PROGRESS NOTES
4 Eyes Skin Assessment     NAME:  Cholo Bhakta  YOB: 1955  MEDICAL RECORD NUMBER:  [id number]    The patient is being assessed for  Admission    I agree that at least one RN has performed a thorough Head to Toe Skin Assessment on the patient. ALL assessment sites listed below have been assessed.      Areas assessed by both nurses:    Head, Face, Ears, Shoulders, Back, Chest, Arms, Elbows, Hands, Sacrum. Buttock, Coccyx, Ischium, and Legs. Feet and Heels        Does the Patient have a Wound? Yes wound(s) were present on assessment. LDA wound assessment was Initiated and completed by RN       George Prevention initiated by RN: Yes  Wound Care Orders initiated by RN: Yes (wounds documented and dressed in ED today)    Pressure Injury (Stage 3,4, Unstageable, DTI, NWPT, and Complex wounds) if present, place Wound referral order by RN under : Yes    New Ostomies, if present place, Ostomy referral order under : No     Nurse 1 eSignature: Electronically signed by Ranjana Mccloud RN on 6/24/25 at 5:15 PM CDT    **SHARE this note so that the co-signing nurse can place an eSignature**    Nurse 2 eSignature: Electronically signed by Cata Suárez RN on 6/24/25 at 5:31 PM CDT

## 2025-06-24 NOTE — H&P
[facility]      [facility]ist - History & Physical      PCP: Nirmal Nuno MD    Date of Admission: 6/23/2025    Date of Service: 6/23/2025    Chief Complaint:  Fall    History Of Present Illness:   The patient is a 70 y.o. male with CHF, CKD, diabetes, hyperlipidemia, HTN comes to ED for further evaluation following a fall.  Wife is present at bedside who reports that when they arrived home from his dialysis treatment patient complained of a headache and fell getting out of his truck. EMS was called to assist in getting patient up and when they arrived patient had a seizure. Patient does not remember the event and is unable to give me any details. Wife states that patient has been altered today and is \"not acting like himself.\"      In ED: EKG sinus tachycardia ; XR pelvis with no visible fractures; CXR with no acute cardiopulmonary disease; CT cervical spine with no acute fracture; CT head with no acute intracranial findings; UA with moderate LE, present yeast, 31-50 WBC, urine culture pending; WBC 10.9, H&H 12.6/36.9, creatinine 3.7, BUN 26, GFR 17, CMP otherwise unremarkable.  Patient will be placed in observation to hospitalist with consults to neurology and nephrology.    Past Medical History:        Diagnosis Date    CHF (congestive heart failure) (HCC)     Chronic kidney disease     Diabetes mellitus (HCC)     Hyperlipidemia     Hypertension     Palliative care patient 03/07/2025       Past Surgical History:        Procedure Laterality Date    CORONARY ARTERY BYPASS GRAFT      VASCULAR SURGERY N/A 6/4/2025    PERMCATH INSERTION performed by Aj Lyons MD at [facility]       Home Medications:  Prior to Admission medications    Medication Sig Start Date End Date Taking? Authorizing Provider   midodrine (PROAMATINE) 5 MG tablet Take 1 tablet by mouth 3 times daily (with meals) 6/14/25   Sarbjit Steve MD   metoprolol succinate (TOPROL XL) 25 MG extended release tablet Take 1 tablet  precautions   - Seizure precautions   - Telesitter at bedside   - CBC w/ diff, BMP w/ reflex to mag daily    - PT/OT eval and treat   - Daily weights   - Strict I/O   - TSH, free T4, Vitamin B12 & folate    Active Problems:    ESRD (end stage renal disease) on dialysis    - Consult to nephrology   - Continue rocaltrol, midodrine      Hyperlipidemia   - Continue rosuvastatin      Essential hypertension   - Continue isordil, metoprolol      Diabetes mellitus    - Lantus 20 units nightly  - POCT glucose AC/HS  - Low-dose SSI  - Hypoglycemia treatment protocol      Memory loss   - Continue aricept       GERD (gastroesophageal reflux disease)   - Continue pantoprazole      BPH (benign prostatic hyperplasia)   - Continue tamsulosin      Seizure-like activity   - Ativan prn      Anxiety   - Continue nortriptyline, buspar, lexapro    Resolved Problems:    * No resolved hospital problems. *    DVT prophylaxis: Patient on eliquis  GI prophylaxis: Patient on home PPI      Signed:  CORI Nix CNP, 6/23/2025 10:39 PM      EMR Dragon/Transcription disclaimer:   Much of this encounter note is an electronic transcription/translation of spoken language to printed text. The electronic translation of spoken language may permit erroneous, or at times, nonsensical words or phrases to be inadvertently transcribed; although attempts have made to review the note for such errors, some may still exist.

## 2025-06-24 NOTE — ED NOTES
ED TO INPATIENT SBAR HANDOFF    Patient Name: Cholo Bhakta   : 1955  70 y.o.   Family/Caregiver Present: Yes  Code Status Order: Full Code    C-SSRS: Risk of Suicide: No Risk  Sitter No  Restraints:         Situation  Chief Complaint:   Chief Complaint   Patient presents with    Fall     Pt had dialysis today and when he got home from dialysis he had a fall. Pt is tachy, has a skin tear on R arm, and had a seizure two weeks ago. Pt started dialysis two weeks ago.     Patient Diagnosis: AMS (altered mental status) [R41.82]     Brief Description of Patient's Condition: Pt who presents to the emergency department with CC of fall.  Patient got home from dialysis today and fell sustaining a skin tear on his right arm.  He is unsure how he fell.  States he has not felt well for the past month.  He had a brief seizure-like episode 2 weeks ago after dialysis, was evaluated by neurology, had a CT head and EEG which were unremarkable and they did not start any antiepileptics.  He has not had any more episodes of seizure since then.  He is unsure how he fell, does not know if he lost his balance or passed out.  He has noted to be in A-fib with rate in the 110s.  He states he has had generalized malaise with some nausea and vomiting over the last month. States he did not hit his head during the fall and states he landed on his bottom.     Pt has been hypotensive while this RN has been on shift. He states his BP is normally on the low side. He has been alert and oriented while in ED, but can be slow to respond at times. Pt gets dialysis MWF.    Mental Status: oriented and alert  Arrived from: home    Imaging:   CT CERVICAL SPINE WO CONTRAST   Final Result       No acute fracture.       If symptoms persist, or if there is concern for occult injury, consider follow-up MRI.        All CT scans are performed using dose optimization techniques as appropriate to the performed exam and include    at least one of the following:

## 2025-06-24 NOTE — PROGRESS NOTES
Cholo Bhakta arrived to room # 402.   Presented with: AMS  Mental Status: Patient is oriented and alert.   Vitals:    06/24/25 1623   BP: 93/73   Pulse: 81   Resp: 18   Temp: 97.5 °F (36.4 °C)   SpO2: 98%     Patient safety contract and falls prevention contract reviewed with patient.  Oriented Patient and Family to room.  Call light within reach. Yes.  Needs, issues or concerns expressed at this time: no.      Electronically signed by Ranjana Mccloud RN on 6/24/2025 at 5:16 PM

## 2025-06-24 NOTE — PROGRESS NOTES
Pharmacy Renal Adjustment    Cholo Bhakta is a 70 y.o. male. Pharmacy has renally adjusted medications per protocol.    Recent Labs     06/23/25  1900 06/24/25  0010   BUN 26* 29*       Recent Labs     06/23/25  1900 06/24/25  0010   CREATININE 3.7* 4.2*       Estimated Creatinine Clearance: 22 mL/min (A) (based on SCr of 4.2 mg/dL (H)).    Height:   Ht Readings from Last 1 Encounters:   06/23/25 1.829 m (6')     Weight:  Wt Readings from Last 1 Encounters:   06/23/25 120.2 kg (265 lb)       CKD stage: ESRD (end stage renal disease) on dialysis  Baseline SCr: ESRD (end stage renal disease) on dialysis    Plan: Adjust the following medications based on renal function:           Famotidine 20 mg orally twice daily adjusted to Famotidine 20 mg orally daily.     Electronically signed by Meg Duran RPH on 6/24/2025 at 7:23 AM

## 2025-06-24 NOTE — PROGRESS NOTES
[facility]  Nurse  Consult Note       NAME:  Cholo Bhakta  MEDICAL RECORD NUMBER:  [id number]  AGE: 70 y.o.   GENDER: male  : 1955  TODAY'S DATE:  2025    Subjective   Reason for Wound Nurse Evaluation and Assessment: Pressure stage 2 - bilateral buttocks, left foot diabetic wounds, right arm skin tears      Cholo Bhakta is a 70 y.o. male referred by:   [x] Physician  [] Nursing  [] Other:     Wound Identification:  Wound Type: diabetic, pressure, and skin tear  Contributing Factors: diabetes, chronic pressure, and decreased mobility    Wound History: Wound care has been consulted for multiple wounds. The right arm has multiple skin tears present. The wound beds are pink/red with a small amount of bleeding noted. The bilateral upper buttocks both have a stage 2 pressure injury present. There is slough in the wound bed with a small amount of yellow colored drainage. The right foot has chronic diabetic wounds that have been present for an extended period of time. These wounds have progressed well since the last admission. There is epithelization noted to the wound edges. The wound bed is pink/red. Scant drainage.       Current Wound Care Treatment:      RIGHT FOOT: Cleanse wounds with soap and water with each dressing change. Apply Puracol Plus Ag to the wound bed. Cover with saline moistened gauze (wound bed only). Cover with dry 4x4 fluff and secure with roll gauze. Change daily and PRN    RIGHT ARM: Cleanse gently with soap and water. Apply Xeroform cut slightly larger than the skin tears and doubled. Cover with ABD or dry 4x4 fluffs and secure with roll gauze. Change daily and PRN    BUTTOCKS/COCCYX: Cleanse with soap and water. Apply Triad Hydrophilic Wound Dressing cream to wounds and surrounding skin. Re-apply twice daily and PRN    Offload heels with heel lift boots or (2) pillows at all times. Heels should be floating in the air.    Patient Goal of Care:  [x] Wound Healing  [] Odor Control  []

## 2025-06-24 NOTE — PROGRESS NOTES
PHARMACY NOTE  Cholo Livia was ordered Astelin Nasal Spray. Per the [facility] Formulary Committee, this medication is non-formulary and not stocked by pharmacy.  It has been discontinued and can be reordered at discharge.     Electronically signed by Lena Cervantes RPH on 6/24/2025 at 12:02 AM

## 2025-06-25 LAB
ANION GAP SERPL CALCULATED.3IONS-SCNC: 15 MMOL/L (ref 8–16)
BACTERIA UR CULT: ABNORMAL
BASOPHILS # BLD: 0.1 K/UL (ref 0–0.2)
BASOPHILS NFR BLD: 1 % (ref 0–1)
BUN SERPL-MCNC: 44 MG/DL (ref 8–23)
CALCIUM SERPL-MCNC: 8.9 MG/DL (ref 8.8–10.2)
CHLORIDE SERPL-SCNC: 93 MMOL/L (ref 98–107)
CO2 SERPL-SCNC: 26 MMOL/L (ref 22–29)
CREAT SERPL-MCNC: 5.7 MG/DL (ref 0.7–1.2)
EOSINOPHIL # BLD: 0.5 K/UL (ref 0–0.6)
EOSINOPHIL NFR BLD: 7.1 % (ref 0–5)
ERYTHROCYTE [DISTWIDTH] IN BLOOD BY AUTOMATED COUNT: 15 % (ref 11.5–14.5)
GLUCOSE BLD-MCNC: 160 MG/DL (ref 70–99)
GLUCOSE BLD-MCNC: 165 MG/DL (ref 70–99)
GLUCOSE BLD-MCNC: 239 MG/DL (ref 70–99)
GLUCOSE SERPL-MCNC: 194 MG/DL (ref 70–99)
HCT VFR BLD AUTO: 34.2 % (ref 42–52)
HGB BLD-MCNC: 11.1 G/DL (ref 14–18)
IMM GRANULOCYTES # BLD: 0 K/UL
LYMPHOCYTES # BLD: 1.5 K/UL (ref 1.1–4.5)
LYMPHOCYTES NFR BLD: 23.5 % (ref 20–40)
MAGNESIUM SERPL-MCNC: 2.6 MG/DL (ref 1.6–2.4)
MCH RBC QN AUTO: 29.1 PG (ref 27–31)
MCHC RBC AUTO-ENTMCNC: 32.5 G/DL (ref 33–37)
MCV RBC AUTO: 89.5 FL (ref 80–94)
MONOCYTES # BLD: 0.8 K/UL (ref 0–0.9)
MONOCYTES NFR BLD: 13.3 % (ref 0–10)
NEUTROPHILS # BLD: 3.4 K/UL (ref 1.5–7.5)
NEUTS SEG NFR BLD: 54.5 % (ref 50–65)
ORGANISM: ABNORMAL
ORGANISM: ABNORMAL
PERFORMED ON: ABNORMAL
PHOSPHATE SERPL-MCNC: 6.7 MG/DL (ref 2.5–4.5)
PLATELET # BLD AUTO: 127 K/UL (ref 130–400)
PMV BLD AUTO: 12.5 FL (ref 9.4–12.4)
POTASSIUM SERPL-SCNC: 3.5 MMOL/L (ref 3.5–5)
RBC # BLD AUTO: 3.82 M/UL (ref 4.7–6.1)
SODIUM SERPL-SCNC: 134 MMOL/L (ref 136–145)
WBC # BLD AUTO: 6.3 K/UL (ref 4.8–10.8)

## 2025-06-25 PROCEDURE — G0378 HOSPITAL OBSERVATION PER HR: HCPCS

## 2025-06-25 PROCEDURE — 96375 TX/PRO/DX INJ NEW DRUG ADDON: CPT

## 2025-06-25 PROCEDURE — 96361 HYDRATE IV INFUSION ADD-ON: CPT

## 2025-06-25 PROCEDURE — 8010000000 HC HEMODIALYSIS ACUTE INPT

## 2025-06-25 PROCEDURE — 2500000003 HC RX 250 WO HCPCS: Performed by: HOSPITALIST

## 2025-06-25 PROCEDURE — 80048 BASIC METABOLIC PNL TOTAL CA: CPT

## 2025-06-25 PROCEDURE — 85025 COMPLETE CBC W/AUTO DIFF WBC: CPT

## 2025-06-25 PROCEDURE — 5A1D70Z PERFORMANCE OF URINARY FILTRATION, INTERMITTENT, LESS THAN 6 HOURS PER DAY: ICD-10-PCS | Performed by: INTERNAL MEDICINE

## 2025-06-25 PROCEDURE — 6370000000 HC RX 637 (ALT 250 FOR IP): Performed by: HOSPITALIST

## 2025-06-25 PROCEDURE — 83735 ASSAY OF MAGNESIUM: CPT

## 2025-06-25 PROCEDURE — 36415 COLL VENOUS BLD VENIPUNCTURE: CPT

## 2025-06-25 PROCEDURE — 94760 N-INVAS EAR/PLS OXIMETRY 1: CPT

## 2025-06-25 PROCEDURE — 2580000003 HC RX 258: Performed by: INTERNAL MEDICINE

## 2025-06-25 PROCEDURE — 6360000002 HC RX W HCPCS: Performed by: INTERNAL MEDICINE

## 2025-06-25 PROCEDURE — 6360000002 HC RX W HCPCS: Performed by: HOSPITALIST

## 2025-06-25 PROCEDURE — 84100 ASSAY OF PHOSPHORUS: CPT

## 2025-06-25 PROCEDURE — 6370000000 HC RX 637 (ALT 250 FOR IP): Performed by: INTERNAL MEDICINE

## 2025-06-25 PROCEDURE — 96376 TX/PRO/DX INJ SAME DRUG ADON: CPT

## 2025-06-25 PROCEDURE — 82962 GLUCOSE BLOOD TEST: CPT

## 2025-06-25 RX ORDER — SEVELAMER CARBONATE 800 MG/1
800 TABLET, FILM COATED ORAL
Status: DISCONTINUED | OUTPATIENT
Start: 2025-06-25 | End: 2025-06-27 | Stop reason: HOSPADM

## 2025-06-25 RX ORDER — MIDODRINE HYDROCHLORIDE 5 MG/1
10 TABLET ORAL ONCE
Status: DISCONTINUED | OUTPATIENT
Start: 2025-06-25 | End: 2025-06-26 | Stop reason: ALTCHOICE

## 2025-06-25 RX ORDER — PREGABALIN 50 MG/1
50 CAPSULE ORAL 2 TIMES DAILY
Status: DISCONTINUED | OUTPATIENT
Start: 2025-06-25 | End: 2025-06-25

## 2025-06-25 RX ORDER — PREGABALIN 50 MG/1
50 CAPSULE ORAL 2 TIMES DAILY
Status: DISCONTINUED | OUTPATIENT
Start: 2025-06-25 | End: 2025-06-27 | Stop reason: HOSPADM

## 2025-06-25 RX ORDER — MIDODRINE HYDROCHLORIDE 5 MG/1
10 TABLET ORAL
Status: DISCONTINUED | OUTPATIENT
Start: 2025-06-25 | End: 2025-06-27 | Stop reason: HOSPADM

## 2025-06-25 RX ADMIN — PANTOPRAZOLE SODIUM 40 MG: 40 TABLET, DELAYED RELEASE ORAL at 05:26

## 2025-06-25 RX ADMIN — SEVELAMER CARBONATE 800 MG: 800 TABLET, FILM COATED ORAL at 14:23

## 2025-06-25 RX ADMIN — ROSUVASTATIN 10 MG: 10 TABLET, FILM COATED ORAL at 21:16

## 2025-06-25 RX ADMIN — MIDODRINE HYDROCHLORIDE 10 MG: 5 TABLET ORAL at 16:27

## 2025-06-25 RX ADMIN — ESCITALOPRAM OXALATE 10 MG: 10 TABLET ORAL at 14:24

## 2025-06-25 RX ADMIN — SODIUM CHLORIDE: 0.9 INJECTION, SOLUTION INTRAVENOUS at 05:27

## 2025-06-25 RX ADMIN — METOPROLOL SUCCINATE 25 MG: 25 TABLET, EXTENDED RELEASE ORAL at 14:24

## 2025-06-25 RX ADMIN — TAMSULOSIN HYDROCHLORIDE 0.4 MG: 0.4 CAPSULE ORAL at 14:24

## 2025-06-25 RX ADMIN — DOCUSATE SODIUM 100 MG: 100 CAPSULE, LIQUID FILLED ORAL at 21:16

## 2025-06-25 RX ADMIN — FAMOTIDINE 20 MG: 20 TABLET, FILM COATED ORAL at 21:16

## 2025-06-25 RX ADMIN — DOCUSATE SODIUM 100 MG: 100 CAPSULE, LIQUID FILLED ORAL at 14:24

## 2025-06-25 RX ADMIN — MIDODRINE HYDROCHLORIDE 10 MG: 5 TABLET ORAL at 14:24

## 2025-06-25 RX ADMIN — APIXABAN 5 MG: 5 TABLET, FILM COATED ORAL at 14:24

## 2025-06-25 RX ADMIN — CALCITRIOL CAPSULES 0.25 MCG 0.25 MCG: 0.25 CAPSULE ORAL at 14:24

## 2025-06-25 RX ADMIN — INSULIN LISPRO 2 UNITS: 100 INJECTION, SOLUTION INTRAVENOUS; SUBCUTANEOUS at 17:49

## 2025-06-25 RX ADMIN — INSULIN GLARGINE 20 UNITS: 100 INJECTION, SOLUTION SUBCUTANEOUS at 21:22

## 2025-06-25 RX ADMIN — FERROUS SULFATE TAB 325 MG (65 MG ELEMENTAL FE) 325 MG: 325 (65 FE) TAB at 14:23

## 2025-06-25 RX ADMIN — ONDANSETRON 4 MG: 2 INJECTION, SOLUTION INTRAMUSCULAR; INTRAVENOUS at 08:28

## 2025-06-25 RX ADMIN — SEVELAMER CARBONATE 800 MG: 800 TABLET, FILM COATED ORAL at 16:27

## 2025-06-25 RX ADMIN — FERROUS SULFATE TAB 325 MG (65 MG ELEMENTAL FE) 325 MG: 325 (65 FE) TAB at 21:16

## 2025-06-25 RX ADMIN — ONDANSETRON 4 MG: 2 INJECTION, SOLUTION INTRAMUSCULAR; INTRAVENOUS at 16:26

## 2025-06-25 RX ADMIN — DONEPEZIL HYDROCHLORIDE 10 MG: 10 TABLET ORAL at 14:24

## 2025-06-25 RX ADMIN — HYDROCODONE BITARTRATE AND ACETAMINOPHEN 1 TABLET: 5; 325 TABLET ORAL at 16:26

## 2025-06-25 RX ADMIN — SODIUM CHLORIDE, PRESERVATIVE FREE 10 ML: 5 INJECTION INTRAVENOUS at 21:19

## 2025-06-25 RX ADMIN — APIXABAN 5 MG: 5 TABLET, FILM COATED ORAL at 21:16

## 2025-06-25 RX ADMIN — ACETAMINOPHEN 650 MG: 325 TABLET ORAL at 18:00

## 2025-06-25 RX ADMIN — HEPARIN SODIUM 3600 UNITS: 1000 INJECTION INTRAVENOUS; SUBCUTANEOUS at 11:54

## 2025-06-25 RX ADMIN — PREGABALIN 50 MG: 50 CAPSULE ORAL at 17:49

## 2025-06-25 RX ADMIN — HYDROCODONE BITARTRATE AND ACETAMINOPHEN 1 TABLET: 5; 325 TABLET ORAL at 03:54

## 2025-06-25 RX ADMIN — NORTRIPTYLINE HYDROCHLORIDE 10 MG: 10 CAPSULE ORAL at 21:16

## 2025-06-25 ASSESSMENT — ENCOUNTER SYMPTOMS
BACK PAIN: 1
NAUSEA: 0
PHOTOPHOBIA: 1
VOMITING: 0
SHORTNESS OF BREATH: 0
COUGH: 0

## 2025-06-25 ASSESSMENT — PAIN DESCRIPTION - ORIENTATION
ORIENTATION: OTHER (COMMENT)
ORIENTATION: RIGHT;LEFT;MID

## 2025-06-25 ASSESSMENT — PAIN DESCRIPTION - LOCATION
LOCATION: HEAD
LOCATION: HEAD
LOCATION: GENERALIZED

## 2025-06-25 ASSESSMENT — PAIN DESCRIPTION - DESCRIPTORS
DESCRIPTORS: ACHING
DESCRIPTORS: THROBBING
DESCRIPTORS: DULL

## 2025-06-25 ASSESSMENT — PAIN SCALES - GENERAL
PAINLEVEL_OUTOF10: 7
PAINLEVEL_OUTOF10: 7
PAINLEVEL_OUTOF10: 8

## 2025-06-25 NOTE — CONSULTS
[facility]  [address], [address]  [place] [address]  Phone [phone]     Neurology Consultation     Date of Admission: 2025  6:50 PM  Date of Consultation: 25    Attending Provider: Day Gracia MD  Consulting Provider: Ramo Miller M.D.    Patient: Cholo Bhakta  :  1955  Age:  70 y.o.  MRN:  [id number]    CHIEF COMPLAINT:  Possible seizure    History Source: History obtained from chart review and wife.  PCP: Nirmal Nuno MD    HISTORY OF PRESENT ILLNESS:   Cholo Bhakta is a 70 y.o. year old man with chronic advanced diabetes, ESRD on HD, chronic pain who was admitted  for fall or syncope.  Mr. Bhakta has all the ravages of diabetes.  He has a severe peripheral neuropathy, charcot joints, nonhealing left heal wounds that was was recently debriefed.  He has chronic multifactorial pain and takes opioid analgesics daily.  He has chronic daily headaches, likely analgesic rebound type.  He just started hemodialysis a few weeks ago.  He does not feel well after dialysis.  He often gets hypotension and is no midodrine.  He gets severe headaches after dialysis.  While hospitalized a week ago or so, after dialysis, he sat up, complained of a severe headache and then passed out and had convulsions.  He had a negative evaluation and anticonvulsants were not started.  He was thought to have had convulsive syncope rather than an epileptic seizure.  He was discharged home on .  He has very limited mobility and does not stand or walk much.  Yesterday, after dialysis, he got out of his truck, stood beside it and immediately passed out and fell.  He says he did not hit his head.  Family could not get him up and EMS was called.  While waiting, his wife says he had a seizure as he had a severe headache and was staring and responding sporadically.  She says since then, he has been less conversant and has not seemed his usual self.  These are the only two events he has had with  0  Triceps                                0                         0  Patellar                                0                         0  Achilles                                0                         0  Right Plantar: equivocal  Left Plantar: equivocal    Right pathological reflexes: Danilo's absent. Ankle clonus absent.  Left pathological reflexes: Danilo's absent. Ankle clonus absent.    Coordination  Right: Finger-to-nose normal. Rapid alternating movement normal.Left: Finger-to-nose normal. Rapid alternating movement normal.        ADDITIONAL REVIEW:  CBC:    Recent Labs     06/23/25  1900 06/24/25  0010   WBC 10.9* 8.7   HGB 12.6* 11.9*   * 122*     BMP:     Recent Labs     06/23/25  1900 06/24/25  0010 06/24/25  0751 06/24/25  1111   * 133*  --   --    K 4.0 4.2  --   --    CL 89* 92*  --   --    CO2 22 25  --   --    BUN 26* 29*  --   --    CREATININE 3.7* 4.2*  --   --    GLUCOSE 343* 293* 202 183     Hepatic:  Recent Labs     06/23/25  1900   AST 26   ALT 16   BILITOT 0.6   ALKPHOS 128     Narrative & Impression  EXAMINATION: HEAD CT WITHOUT CONTRAST     HISTORY: Fall.     TECHNIQUE: Noncontrast CT of the brain was performed with images acquired from skull base to vertex. 2-D coronal and sagittal reformatted images were obtained from the axial source images.  Contrast Dose: None.  CT Dose Reduction Techniques Performed: Yes.     COMPARISON: 06/10/2025     FINDINGS:     Stable mild atrophy.  No intracranial hemorrhage or significant extra-axial fluid collection.  No region of abnormal low attenuation in the brain.  Grey white differentiation is preserved.  No mass effect or midline shift.  No hydrocephalus.  Bilateral cataract surgery, again noted.  Polyp/mucous retention cyst within the right maxillary sinus, again noted.  The mastoid air cells are clear.  No visible skull fracture.        IMPRESSION:     1. No acute intracranial findings.      All CT scans are performed using dose

## 2025-06-25 NOTE — PLAN OF CARE
Problem: Safety - Adult  Goal: Free from fall injury  Outcome: Progressing     Problem: Chronic Conditions and Co-morbidities  Goal: Patient's chronic conditions and co-morbidity symptoms are monitored and maintained or improved  Outcome: Progressing     Problem: Seizure Precautions  Goal: Remains free of injury related to seizures activity  Outcome: Progressing     Problem: Pain  Goal: Verbalizes/displays adequate comfort level or baseline comfort level  Outcome: Progressing     Problem: Skin/Tissue Integrity  Goal: Skin integrity remains intact  Description: 1.  Monitor for areas of redness and/or skin breakdown  2.  Assess vascular access sites hourly  3.  Every 4-6 hours minimum:  Change oxygen saturation probe site  4.  Every 4-6 hours:  If on nasal continuous positive airway pressure, respiratory therapy assess nares and determine need for appliance change or resting period  Outcome: Progressing     Problem: ABCDS Injury Assessment  Goal: Absence of physical injury  Outcome: Progressing

## 2025-06-25 NOTE — PROGRESS NOTES
Renal progress Note    Thank you to requesting provider: Dr Fairchild, for asking us to see Cholo Bhakta    Reason for consultation: ESRD    Chief Complaint: AMS     History of Presenting Illness      Patient is a 70-year-old a pleasant man with a past medical history of CHF, hypertension, type 2 diabetes, CAD, morbid obesity, and ESRD.  He has recently started chronic maintenance hemodialysis.  In [id number], he was status post dialysis on June 23.  Patient reported hypotension, fatigue and headaches after dialysis.  He reported on June 23 to the ED, after he sustained an episode of seizure with change in mental status.  He even had a fall without any major head trauma.  He has generalized weakness.  Renal service was consulted to manage his ESRD.  Today patient was seen and examined during dialysis.  He started to feel better.  His BPs have improved after IV fluids.    Pt was seen on RRT  Modality: Hemodialysis  Access: Catheter  Location: right upper  QB: 450  QD: 700  UF: 500 ml    Past Medical/Surgical History      Active Ambulatory Problems     Diagnosis Date Noted    Systolic CHF, chronic (HCC) 02/21/2025    Volume overload 02/21/2025    ESRD (end stage renal disease) on dialysis (HCC) 02/21/2025    Cardiorenal syndrome 02/21/2025    Chronic obstructive pulmonary disease (HCC) 12/03/2024    Essential hypertension 05/03/2017    Diabetes mellitus (HCC) 02/22/2025    CAD (coronary artery disease) 01/20/2023    Diabetic ulcer of left foot associated with type 2 diabetes mellitus (HCC) 08/31/2020    Gout 03/06/2025    Memory loss 03/06/2025    GERD (gastroesophageal reflux disease) 03/06/2025    BPH (benign prostatic hyperplasia) 03/06/2025    Neuropathy 03/06/2025    Palliative care patient 03/07/2025    Severe malnutrition 06/03/2025    Fall 06/03/2025    General weakness 06/23/2025    Atrial fibrillation (HCC) 05/06/2022    Chronic constipation 10/06/2020     Resolved Ambulatory Problems     Diagnosis Date

## 2025-06-25 NOTE — DIALYSIS
[facility] INPATIENT SERVICES  DIALYSIS TREATMENT SUMMARY      Note: Consult with the attending physician for patient treatment orders, this document is not a physician order.      Patient Information   Patient Cholo Bhakta   Date of Birth June 18, 1955   Chart Number [id number]   Location [facility] MRN [id number]   Gender Male   SSN (last 4) [id number]     Treatment Information   Treatment Type Hemodialysis   Treatment Id [id number]   Start Time June 25, 2025 08:03   End Time June 25, 2025 11:40   Acutal Duration 03:37     Treatment Balances   Total Saline Administered 700   Net Fluid Balance -500    Hemodialysis Orders   Therapy Standard   Orders Verified Time 06/25/2025 08:00    Date Verified 06/25/2025   Duration 3:30   Isolated UF/Bypass No   BFR (mL) 450   DFR (mL) 700   Dialyzer Type OPTIFLUX 160NR   UF Order UF Goal Ordered   UF Goal Ordered (mL) 500   Crit-Line used No   Heparin Initial Units Bolus No   Heparin IV Maintenance Bolus No   Heparin IV Infusion No   Potassium (mEq/L) 4   Calcium (mEq/L) 2.5   Bicarb (mg/dL) 35   Sodium (mEq/L) 140   Clinician Nelson Haque RN    Dialysis Access   Access Type Central Venous Catheter   Central Venous Catheter   Access Type Catheter - Tunneled   Access Location Chest Wall - R   Current/New [facility] Patient Yes   1st Use Catheter Verified by Previous Use   Catheter Care Completed per Policy Yes   Dressing Dry and Intact on Arrival Yes   Dressing Changed Yes   Type of Dressing Film Biopatch/CHG   Dressing Changed By [facility] Staff   Type of HD Caps Curos   HD Caps Changed Yes   CVC Line Education Provided Yes - Infection Prevention      Vitals   Pre-Treatment Vitals   Time Is BP being recorded? Pre BP Map BP Method Pulse RR Temp How was Weight Obtained? Pre Weight Previous Dry Weight Previous Post Weight Metric Target Fluid Removal (mL) Dialysate Confirmed Clinician    06/25/2025 07:55 BP/Map 107/58 (74) Noninvasive 62 16 97.7 How Obtained:

## 2025-06-25 NOTE — CONSULTS
Palliative Care: Attempted to see pt earlier today but he was in HD.  When I returned this afternoon he was asleep and did not want to disturb.  Call made to pt spouse.  Pt presents to ed d/t fall at after he arrived home from HD. Pt has skin tear to right arm per notes.  Call made to spouse for additional information.  Pt had a recent admission here 6/2-6/14 dc'd to [place] for rehab.  HD was initiated at this admission.  Spouse states he has had decline in kidney function over the years d/t reaction to a medication.              Past Medical History:        Past Medical History:   Diagnosis Date    CHF (congestive heart failure) (HCC)     Chronic kidney disease     Diabetes mellitus (HCC)     Hyperlipidemia     Hypertension     Palliative care patient 03/07/2025       Advance Directives:    Full Code           Pain/Other Symptoms:   Appears comfortable, asleep in bed.            How are symptoms affecting QOL  Decreased endurance  Wounds(followed by Dr. Heaton)   Needs assist with ADLs        Psychological/Spiritual:   Trace Zuni                    Plan:  HD as ordered, medical management, Wound care consult          Patient/family discussion r/t goals:           (what does living well look like to you)?  Spouse plans for pt to return to their home on dc.  Would like for HH to follow on dc.  She states he is able to amb with walker or w/c.  Has a shower bench in the home.  She transports pt to [facility] for HD.              Palliative Performance Scale:        50    Palliative Care team will continue to follow and support, with ongoing review of pt's goals of care.                Electronically signed by Maia Narayanan RN on 6/25/2025 at 1:52 PM

## 2025-06-25 NOTE — PROGRESS NOTES
Progress Note    Date:2025       Room:0402/0402-01  Patient Name:Cholo Bhakta     YOB: 1955     Age:70 y.o.      Subjective    Subjective: 70 year old male with CHF, ESRD on dialysis, diabetes, hyperlipidemia, HTN who presents to ED for further evaluation following a fall.  Wife is present at bedside who reports that when they arrived home from his dialysis treatment patient complained of a headache and fell getting out of his truck. EMS was called to assist in getting patient up and when they arrived patient had a seizure. In ED: EKG with sinus tachycardia ; XR pelvis with no visible fractures; CXR with no acute cardiopulmonary disease; CT cervical spine with no acute fracture; CT head with no acute intracranial findings; UA with moderate LE, present yeast, 31-50 WBC, urine culture pending. Admitted in house for neuro and nephro eval.    Seen by neurology, syncope post dialysis in setting of ortho hypotension hx. Doubt epileptic seizures. EEG ordered.     Review of Systems: 6 point system reviewed and negative except as stated above.    Objective         Vitals Last 24 Hours:  TEMPERATURE:  Temp  Av.5 °F (36.4 °C)  Min: 97.2 °F (36.2 °C)  Max: 97.7 °F (36.5 °C)  RESPIRATIONS RANGE: Resp  Avg: 15.9  Min: 12  Max: 18  PULSE OXIMETRY RANGE: SpO2  Av.3 %  Min: 94 %  Max: 100 %  PULSE RANGE: Pulse  Av.1  Min: 57  Max: 91  BLOOD PRESSURE RANGE: Systolic (24hrs), Av , Min:86 , Max:131   ; Diastolic (24hrs), Av, Min:51, Max:91    I/O (24Hr):  No intake or output data in the 24 hours ending 25 1124      Physical Examination:  General: no acute distress lying comfortably in bed.  HEENT: Atraumatic normocephalic, range of motion normal, no JVD, no tracheal deviation noted.  Cardiac: Normal S1-S2   Respiratory: clear To auscultation bilaterally, no rhonchi or rales, no wheezing  Abdomen: Soft, positive bowel sounds in all quadrants, no distention, nontender to palpation, no

## 2025-06-26 LAB
ANION GAP SERPL CALCULATED.3IONS-SCNC: 15 MMOL/L (ref 8–16)
BASOPHILS # BLD: 0.1 K/UL (ref 0–0.2)
BASOPHILS NFR BLD: 0.9 % (ref 0–1)
BUN SERPL-MCNC: 26 MG/DL (ref 8–23)
CALCIUM SERPL-MCNC: 8.6 MG/DL (ref 8.8–10.2)
CHLORIDE SERPL-SCNC: 100 MMOL/L (ref 98–107)
CO2 SERPL-SCNC: 23 MMOL/L (ref 22–29)
CREAT SERPL-MCNC: 4 MG/DL (ref 0.7–1.2)
EOSINOPHIL # BLD: 0.4 K/UL (ref 0–0.6)
EOSINOPHIL NFR BLD: 5.7 % (ref 0–5)
ERYTHROCYTE [DISTWIDTH] IN BLOOD BY AUTOMATED COUNT: 15 % (ref 11.5–14.5)
GLUCOSE BLD-MCNC: 152 MG/DL (ref 70–99)
GLUCOSE BLD-MCNC: 161 MG/DL (ref 70–99)
GLUCOSE BLD-MCNC: 184 MG/DL (ref 70–99)
GLUCOSE BLD-MCNC: 76 MG/DL (ref 70–99)
GLUCOSE SERPL-MCNC: 81 MG/DL (ref 70–99)
HCT VFR BLD AUTO: 34.1 % (ref 42–52)
HGB BLD-MCNC: 11.2 G/DL (ref 14–18)
IMM GRANULOCYTES # BLD: 0 K/UL
LYMPHOCYTES # BLD: 1 K/UL (ref 1.1–4.5)
LYMPHOCYTES NFR BLD: 15.2 % (ref 20–40)
MCH RBC QN AUTO: 29.4 PG (ref 27–31)
MCHC RBC AUTO-ENTMCNC: 32.8 G/DL (ref 33–37)
MCV RBC AUTO: 89.5 FL (ref 80–94)
MONOCYTES # BLD: 0.9 K/UL (ref 0–0.9)
MONOCYTES NFR BLD: 13.5 % (ref 0–10)
NEUTROPHILS # BLD: 4.4 K/UL (ref 1.5–7.5)
NEUTS SEG NFR BLD: 64.1 % (ref 50–65)
PERFORMED ON: ABNORMAL
PERFORMED ON: NORMAL
PLATELET # BLD AUTO: 120 K/UL (ref 130–400)
PMV BLD AUTO: 12.3 FL (ref 9.4–12.4)
POTASSIUM SERPL-SCNC: 4 MMOL/L (ref 3.5–5)
RBC # BLD AUTO: 3.81 M/UL (ref 4.7–6.1)
SODIUM SERPL-SCNC: 138 MMOL/L (ref 136–145)
WBC # BLD AUTO: 6.8 K/UL (ref 4.8–10.8)

## 2025-06-26 PROCEDURE — 95816 EEG AWAKE AND DROWSY: CPT

## 2025-06-26 PROCEDURE — 36415 COLL VENOUS BLD VENIPUNCTURE: CPT

## 2025-06-26 PROCEDURE — 51798 US URINE CAPACITY MEASURE: CPT

## 2025-06-26 PROCEDURE — 6370000000 HC RX 637 (ALT 250 FOR IP): Performed by: HOSPITALIST

## 2025-06-26 PROCEDURE — 85025 COMPLETE CBC W/AUTO DIFF WBC: CPT

## 2025-06-26 PROCEDURE — 82962 GLUCOSE BLOOD TEST: CPT

## 2025-06-26 PROCEDURE — G0378 HOSPITAL OBSERVATION PER HR: HCPCS

## 2025-06-26 PROCEDURE — G0378 HOSPITAL OBSERVATION PER HR: HCPCS | Performed by: HOSPITALIST

## 2025-06-26 PROCEDURE — 96376 TX/PRO/DX INJ SAME DRUG ADON: CPT

## 2025-06-26 PROCEDURE — 6360000002 HC RX W HCPCS: Performed by: HOSPITALIST

## 2025-06-26 PROCEDURE — 94760 N-INVAS EAR/PLS OXIMETRY 1: CPT

## 2025-06-26 PROCEDURE — 2500000003 HC RX 250 WO HCPCS: Performed by: HOSPITALIST

## 2025-06-26 PROCEDURE — 96361 HYDRATE IV INFUSION ADD-ON: CPT

## 2025-06-26 PROCEDURE — 6370000000 HC RX 637 (ALT 250 FOR IP): Performed by: INTERNAL MEDICINE

## 2025-06-26 PROCEDURE — 80048 BASIC METABOLIC PNL TOTAL CA: CPT

## 2025-06-26 RX ADMIN — METOPROLOL SUCCINATE 25 MG: 25 TABLET, EXTENDED RELEASE ORAL at 09:05

## 2025-06-26 RX ADMIN — ONDANSETRON 4 MG: 2 INJECTION, SOLUTION INTRAMUSCULAR; INTRAVENOUS at 05:18

## 2025-06-26 RX ADMIN — FERROUS SULFATE TAB 325 MG (65 MG ELEMENTAL FE) 325 MG: 325 (65 FE) TAB at 20:30

## 2025-06-26 RX ADMIN — PREGABALIN 50 MG: 50 CAPSULE ORAL at 09:05

## 2025-06-26 RX ADMIN — APIXABAN 5 MG: 5 TABLET, FILM COATED ORAL at 20:30

## 2025-06-26 RX ADMIN — NORTRIPTYLINE HYDROCHLORIDE 10 MG: 10 CAPSULE ORAL at 20:30

## 2025-06-26 RX ADMIN — MIDODRINE HYDROCHLORIDE 10 MG: 5 TABLET ORAL at 09:05

## 2025-06-26 RX ADMIN — SEVELAMER CARBONATE 800 MG: 800 TABLET, FILM COATED ORAL at 12:10

## 2025-06-26 RX ADMIN — CALCITRIOL CAPSULES 0.25 MCG 0.25 MCG: 0.25 CAPSULE ORAL at 09:06

## 2025-06-26 RX ADMIN — PREGABALIN 50 MG: 50 CAPSULE ORAL at 20:30

## 2025-06-26 RX ADMIN — INSULIN GLARGINE 20 UNITS: 100 INJECTION, SOLUTION SUBCUTANEOUS at 20:31

## 2025-06-26 RX ADMIN — DOCUSATE SODIUM 100 MG: 100 CAPSULE, LIQUID FILLED ORAL at 09:06

## 2025-06-26 RX ADMIN — HYDROCODONE BITARTRATE AND ACETAMINOPHEN 1 TABLET: 5; 325 TABLET ORAL at 05:18

## 2025-06-26 RX ADMIN — FAMOTIDINE 20 MG: 20 TABLET, FILM COATED ORAL at 20:30

## 2025-06-26 RX ADMIN — TAMSULOSIN HYDROCHLORIDE 0.4 MG: 0.4 CAPSULE ORAL at 09:06

## 2025-06-26 RX ADMIN — INSULIN LISPRO 2 UNITS: 100 INJECTION, SOLUTION INTRAVENOUS; SUBCUTANEOUS at 20:31

## 2025-06-26 RX ADMIN — SODIUM CHLORIDE, PRESERVATIVE FREE 10 ML: 5 INJECTION INTRAVENOUS at 20:31

## 2025-06-26 RX ADMIN — ROSUVASTATIN 10 MG: 10 TABLET, FILM COATED ORAL at 20:30

## 2025-06-26 RX ADMIN — ESCITALOPRAM OXALATE 10 MG: 10 TABLET ORAL at 09:05

## 2025-06-26 RX ADMIN — SEVELAMER CARBONATE 800 MG: 800 TABLET, FILM COATED ORAL at 09:06

## 2025-06-26 RX ADMIN — Medication 5 MG: at 20:30

## 2025-06-26 RX ADMIN — PANTOPRAZOLE SODIUM 40 MG: 40 TABLET, DELAYED RELEASE ORAL at 05:18

## 2025-06-26 RX ADMIN — MIDODRINE HYDROCHLORIDE 10 MG: 5 TABLET ORAL at 15:10

## 2025-06-26 RX ADMIN — SODIUM CHLORIDE, PRESERVATIVE FREE 10 ML: 5 INJECTION INTRAVENOUS at 09:09

## 2025-06-26 RX ADMIN — APIXABAN 5 MG: 5 TABLET, FILM COATED ORAL at 09:05

## 2025-06-26 RX ADMIN — DONEPEZIL HYDROCHLORIDE 10 MG: 10 TABLET ORAL at 09:05

## 2025-06-26 RX ADMIN — FERROUS SULFATE TAB 325 MG (65 MG ELEMENTAL FE) 325 MG: 325 (65 FE) TAB at 09:06

## 2025-06-26 RX ADMIN — ONDANSETRON 4 MG: 2 INJECTION, SOLUTION INTRAMUSCULAR; INTRAVENOUS at 14:03

## 2025-06-26 RX ADMIN — MIDODRINE HYDROCHLORIDE 10 MG: 5 TABLET ORAL at 12:10

## 2025-06-26 RX ADMIN — BUSPIRONE HYDROCHLORIDE 10 MG: 10 TABLET ORAL at 20:30

## 2025-06-26 RX ADMIN — DOCUSATE SODIUM 100 MG: 100 CAPSULE, LIQUID FILLED ORAL at 20:30

## 2025-06-26 RX ADMIN — SEVELAMER CARBONATE 800 MG: 800 TABLET, FILM COATED ORAL at 15:10

## 2025-06-26 RX ADMIN — HYDROCODONE BITARTRATE AND ACETAMINOPHEN 1 TABLET: 5; 325 TABLET ORAL at 18:34

## 2025-06-26 ASSESSMENT — PAIN DESCRIPTION - LOCATION
LOCATION: GENERALIZED
LOCATION: FOOT

## 2025-06-26 ASSESSMENT — PAIN DESCRIPTION - DESCRIPTORS
DESCRIPTORS: ACHING
DESCRIPTORS: ACHING

## 2025-06-26 ASSESSMENT — PAIN SCALES - GENERAL
PAINLEVEL_OUTOF10: 7
PAINLEVEL_OUTOF10: 7
PAINLEVEL_OUTOF10: 0

## 2025-06-26 ASSESSMENT — PAIN - FUNCTIONAL ASSESSMENT: PAIN_FUNCTIONAL_ASSESSMENT: ACTIVITIES ARE NOT PREVENTED

## 2025-06-26 ASSESSMENT — PAIN DESCRIPTION - ORIENTATION: ORIENTATION: LEFT

## 2025-06-26 NOTE — PROGRESS NOTES
Living Expenses: Not hard at all   Food Insecurity: No Food Insecurity (6/24/2025)    Hunger Vital Sign     Worried About Running Out of Food in the Last Year: Never true     Ran Out of Food in the Last Year: Never true   Transportation Needs: No Transportation Needs (6/24/2025)    PRAPARE - Transportation     Lack of Transportation (Medical): No     Lack of Transportation (Non-Medical): No   Physical Activity: Unknown (4/2/2025)    Physical Activity ([facility] HRSN)     In the last 30 days, other than the activities you did for work, on average, how many days per week did you engage in moderate exercise (like walking fast, running, jogging, dancing, swimming, biking, or other similar activites)?: 0     Number of minutes of exercise per week:: 0   Social Connections: Socially Integrated (4/2/2025)    Social Connections ([facility] HRSN)     If for any reason you need help with day-to-day activities such as bathing, preparing meals, shopping, managing finances, etc., do you get the help you need?: I don't need any help   Intimate Partner Violence: Not At Risk (1/6/2025)    Received from [facility]    Abuse Screen     Feels Unsafe at Home or Work/School: no     Feels Threatened by Someone: no     Does Anyone Try to Keep You From Having Contact with Others or Doing Things Outside Your Home?: no     Physical Signs of Abuse Present: no   Housing Stability: Low Risk  (6/24/2025)    Housing Stability Vital Sign     Unable to Pay for Housing in the Last Year: No     Number of Times Moved in the Last Year: 1     Homeless in the Last Year: No       Physical Exam     Blood pressure 103/60, pulse 67, temperature 97.9 °F (36.6 °C), temperature source Temporal, resp. rate 16, height 1.829 m (6' 0.01\"), weight 121.4 kg (267 lb 9.6 oz), SpO2 96%.    General:  NAD, A+Ox3, ill-appearing, normal body habitus  HEENT: Atraumatic, normocephalic  Neck:  Supple, normal range of movement  Chest:  CTAB, good respiratory effort, good air  movement  CV:  RRR, soft systolic murmur ESRD  Abdomen:  NTND, soft, +BS, no hepatosplenomegaly  Extremities:  No peripheral edema  Neurological:  Moving all four extremities   Psychiatric:  Normal insight and judgement, good recall    Data     Recent Labs     06/24/25  0010 06/25/25  0155 06/26/25  0136   WBC 8.7 6.3 6.8   HGB 11.9* 11.1* 11.2*   HCT 34.9* 34.2* 34.1*   MCV 86.4 89.5 89.5   * 127* 120*     Recent Labs     06/23/25  1900 06/24/25  0010 06/24/25  0751 06/24/25  1111 06/25/25  0155 06/26/25  0136   * 133*  --   --  134* 138   K 4.0 4.2  --   --  3.5 4.0   CL 89* 92*  --   --  93* 100   CO2 22 25  --   --  26 23   GLUCOSE 343* 293*   < > 183 194* 81   PHOS  --   --   --   --  6.7*  --    MG 2.2  --   --   --  2.6*  --    BUN 26* 29*  --   --  44* 26*   CREATININE 3.7* 4.2*  --   --  5.7* 4.0*   LABGLOM 17* 15*  --   --  10* 15*    < > = values in this interval not displayed.       Assessment:  ESRD  Seizure episode  Altered mental status  Status post fall  Anemia of CKD  Type 2 diabetes with renal disease  Hyponatremia  CHF      Plan:  Agree with midodrine for pressure support.  Follow-up with neurology evaluation and recommendations.  Dialysis is due next on June 27.  Will avoid much ultrafiltration on dialysis.

## 2025-06-26 NOTE — PLAN OF CARE
Problem: Safety - Adult  Goal: Free from fall injury  Outcome: Progressing     Problem: Chronic Conditions and Co-morbidities  Goal: Patient's chronic conditions and co-morbidity symptoms are monitored and maintained or improved  Outcome: Progressing     Problem: Seizure Precautions  Goal: Remains free of injury related to seizures activity  Outcome: Progressing     Problem: Pain  Goal: Verbalizes/displays adequate comfort level or baseline comfort level  Outcome: Progressing     Problem: Skin/Tissue Integrity  Goal: Skin integrity remains intact  Description: 1.  Monitor for areas of redness and/or skin breakdown  2.  Assess vascular access sites hourly  3.  Every 4-6 hours minimum:  Change oxygen saturation probe site  4.  Every 4-6 hours:  If on nasal continuous positive airway pressure, respiratory therapy assess nares and determine need for appliance change or resting period  Outcome: Progressing     Problem: ABCDS Injury Assessment  Goal: Absence of physical injury  Outcome: Progressing     Problem: Nutrition Deficit:  Goal: Optimize nutritional status  Outcome: Progressing

## 2025-06-26 NOTE — PROGRESS NOTES
Nutrition Assessment     Type and Reason for Visit: Reassess    Nutrition Recommendations/Plan:   Continue POC     Malnutrition Assessment:  Malnutrition Status: At risk for malnutrition    Nutrition Assessment:  Diet advanced, pt reports not much appetite. Has tried ONS but doesn't care for it. Pt reports some wt loss prior to admission. Does have hx of wt changes, likely r/t CHF and CKD with HD. Dehydrated upon admission. After IVF and HD, pt is still below stated UBW, with a 3.6% wt loss over past 2 weeks.Encouraged po intake.    Estimated Daily Nutrient Needs:  Energy (kcal):  3410-8601 Weight Used for Energy Requirements: Admission     Protein (g):  122-162 Weight Used for Protein Requirements: Ideal        Fluid (ml/day):  1500 Method Used for Fluid Requirements: Standard renal    Nutrition Related Findings:   generalized edema Wound Type: Multiple, Stage II, Pressure Injury, Skin Tears    Current Nutrition Therapies:    ADULT DIET; Regular; 4 carb choices (60 gm/meal); Low Fat/Low Chol/High Fiber/MONTSE; 1800 ml  ADULT ORAL NUTRITION SUPPLEMENT; Breakfast, Lunch; Diabetic Oral Supplement    Anthropometric Measures:  Height: 182.9 cm (6' 0.01\")  Current Body Wt: 121.1 kg (267 lb)   BMI: 36.2        Nutrition Diagnosis:   Unintended weight loss related to inadequate protein-energy intake as evidenced by weight loss    Nutrition Interventions:   Food and/or Nutrient Delivery: Continue Current Diet, Continue Oral Nutrition Supplement  Nutrition Education/Counseling: No recommendation at this time  Coordination of Nutrition Care: Continue to monitor while inpatient       Goals:  Goals: PO intake 50% or greater  Type of Goal: Continue current goal  Previous Goal Met: Progressing toward Goal(s)    Nutrition Monitoring and Evaluation:   Behavioral-Environmental Outcomes: None Identified  Food/Nutrient Intake Outcomes: Food and Nutrient Intake, Supplement Intake  Physical Signs/Symptoms Outcomes: Biochemical Data, Fluid

## 2025-06-26 NOTE — PROGRESS NOTES
Progress Note    Date:2025       Room:0402/0402-01  Patient Name:Cholo Bhakta     YOB: 1955     Age:70 y.o.      Subjective    Subjective: 70 year old male with CHF, ESRD on dialysis, diabetes, hyperlipidemia, HTN who presents to ED for further evaluation following a fall.  Wife is present at bedside who reports that when they arrived home from his dialysis treatment patient complained of a headache and fell getting out of his truck. EMS was called to assist in getting patient up and when they arrived patient had a seizure. In ED: EKG with sinus tachycardia ; XR pelvis with no visible fractures; CXR with no acute cardiopulmonary disease; CT cervical spine with no acute fracture; CT head with no acute intracranial findings; UA with moderate LE, present yeast, 31-50 WBC, urine culture with candida and Enterococcus faecum light growth. Admitted in house for neuro and nephro eval.    Seen by neurology, syncope post dialysis in setting of ortho hypotension hx. Doubt epileptic seizures. EEG ordered.     Review of Systems: 6 point system reviewed and negative except as stated above.    Objective         Vitals Last 24 Hours:  TEMPERATURE:  Temp  Av.4 °F (36.3 °C)  Min: 96.8 °F (36 °C)  Max: 97.9 °F (36.6 °C)  RESPIRATIONS RANGE: Resp  Av.3  Min: 16  Max: 18  PULSE OXIMETRY RANGE: SpO2  Av.9 %  Min: 96 %  Max: 100 %  PULSE RANGE: Pulse  Av  Min: 67  Max: 97  BLOOD PRESSURE RANGE: Systolic (24hrs), Av , Min:97 , Max:121   ; Diastolic (24hrs), Av, Min:48, Max:72    I/O (24Hr):    Intake/Output Summary (Last 24 hours) at 2025 1136  Last data filed at 2025 2119  Gross per 24 hour   Intake 710 ml   Output 1200 ml   Net -490 ml         Physical Examination:  General: no acute distress lying comfortably in bed.  HEENT: Atraumatic normocephalic, range of motion normal, no JVD, no tracheal deviation noted.  Cardiac: Normal S1-S2   Respiratory: clear To auscultation  mellitus (HCC)  Insulin regimen as ordered  Adjust as needed  Hypoglycemia protocol     CAD  Isordil, crestor     Memory loss  Aricept     Chronic headaches  Nortriptyline, PRN Fioricet      Dispo: Awaiting EEG and therapy eval. Likely home in tomorrow      Electronically signed by   Day Gracia MD   Internal Medicine Hospitalist  On 6/26/2025  At 11:36 AM    EMR Dragon/Transcription disclaimer:   Much of this encounter note is an electronic transcription/translation of spoken language to printed text. The electronic translation of spoken language may permit erroneous, or at times, nonsensical words or phrases to be inadvertently transcribed; although attempts have made to review the note for such errors, some may still exist.

## 2025-06-26 NOTE — PLAN OF CARE
Problem: Safety - Adult  Goal: Free from fall injury  6/26/2025 1217 by Sahara Subramanian RN  Outcome: Progressing  6/26/2025 0034 by Marie Reyes RN  Outcome: Progressing     Problem: Chronic Conditions and Co-morbidities  Goal: Patient's chronic conditions and co-morbidity symptoms are monitored and maintained or improved  6/26/2025 1217 by Sahara Subramanian RN  Outcome: Progressing  6/26/2025 0034 by Marie Reyes RN  Outcome: Progressing     Problem: Seizure Precautions  Goal: Remains free of injury related to seizures activity  6/26/2025 1217 by Sahara Subramanian RN  Outcome: Progressing  6/26/2025 0034 by Marie Reyes RN  Outcome: Progressing     Problem: Pain  Goal: Verbalizes/displays adequate comfort level or baseline comfort level  6/26/2025 1217 by Sahara Subramanian RN  Outcome: Progressing  6/26/2025 0034 by Marie Reyes RN  Outcome: Progressing     Problem: Skin/Tissue Integrity  Goal: Skin integrity remains intact  Description: 1.  Monitor for areas of redness and/or skin breakdown  2.  Assess vascular access sites hourly  3.  Every 4-6 hours minimum:  Change oxygen saturation probe site  4.  Every 4-6 hours:  If on nasal continuous positive airway pressure, respiratory therapy assess nares and determine need for appliance change or resting period  6/26/2025 1217 by Sahara Subramanian RN  Outcome: Progressing  6/26/2025 0034 by Marie Reyes RN  Outcome: Progressing     Problem: ABCDS Injury Assessment  Goal: Absence of physical injury  6/26/2025 1217 by Sahara Subramanian RN  Outcome: Progressing  6/26/2025 0034 by Marie Reyes RN  Outcome: Progressing     Problem: Nutrition Deficit:  Goal: Optimize nutritional status  6/26/2025 1217 by Sahara Subramanian RN  Outcome: Progressing  6/26/2025 0034 by Marie Reyes RN  Outcome: Progressing

## 2025-06-27 VITALS
RESPIRATION RATE: 16 BRPM | TEMPERATURE: 96.8 F | HEIGHT: 72 IN | HEART RATE: 75 BPM | WEIGHT: 263.4 LBS | SYSTOLIC BLOOD PRESSURE: 118 MMHG | BODY MASS INDEX: 35.68 KG/M2 | DIASTOLIC BLOOD PRESSURE: 55 MMHG | OXYGEN SATURATION: 95 %

## 2025-06-27 LAB
ANION GAP SERPL CALCULATED.3IONS-SCNC: 14 MMOL/L (ref 8–16)
BASOPHILS # BLD: 0.1 K/UL (ref 0–0.2)
BASOPHILS NFR BLD: 1 % (ref 0–1)
BUN SERPL-MCNC: 36 MG/DL (ref 8–23)
CALCIUM SERPL-MCNC: 8.7 MG/DL (ref 8.8–10.2)
CHLORIDE SERPL-SCNC: 100 MMOL/L (ref 98–107)
CO2 SERPL-SCNC: 25 MMOL/L (ref 22–29)
CREAT SERPL-MCNC: 5.2 MG/DL (ref 0.7–1.2)
EOSINOPHIL # BLD: 0.4 K/UL (ref 0–0.6)
EOSINOPHIL NFR BLD: 7.6 % (ref 0–5)
ERYTHROCYTE [DISTWIDTH] IN BLOOD BY AUTOMATED COUNT: 15.3 % (ref 11.5–14.5)
GLUCOSE BLD-MCNC: 108 MG/DL (ref 70–99)
GLUCOSE BLD-MCNC: 156 MG/DL (ref 70–99)
GLUCOSE SERPL-MCNC: 115 MG/DL (ref 70–99)
HCT VFR BLD AUTO: 34.1 % (ref 42–52)
HGB BLD-MCNC: 11 G/DL (ref 14–18)
IMM GRANULOCYTES # BLD: 0 K/UL
LYMPHOCYTES # BLD: 1.2 K/UL (ref 1.1–4.5)
LYMPHOCYTES NFR BLD: 20.8 % (ref 20–40)
MCH RBC QN AUTO: 29.3 PG (ref 27–31)
MCHC RBC AUTO-ENTMCNC: 32.3 G/DL (ref 33–37)
MCV RBC AUTO: 90.7 FL (ref 80–94)
MONOCYTES # BLD: 0.8 K/UL (ref 0–0.9)
MONOCYTES NFR BLD: 13.7 % (ref 0–10)
NEUTROPHILS # BLD: 3.3 K/UL (ref 1.5–7.5)
NEUTS SEG NFR BLD: 56.2 % (ref 50–65)
PERFORMED ON: ABNORMAL
PERFORMED ON: ABNORMAL
PLATELET # BLD AUTO: 126 K/UL (ref 130–400)
PMV BLD AUTO: 11.9 FL (ref 9.4–12.4)
POTASSIUM SERPL-SCNC: 3.8 MMOL/L (ref 3.5–5)
RBC # BLD AUTO: 3.76 M/UL (ref 4.7–6.1)
SODIUM SERPL-SCNC: 139 MMOL/L (ref 136–145)
WBC # BLD AUTO: 5.8 K/UL (ref 4.8–10.8)

## 2025-06-27 PROCEDURE — 82962 GLUCOSE BLOOD TEST: CPT

## 2025-06-27 PROCEDURE — G0378 HOSPITAL OBSERVATION PER HR: HCPCS

## 2025-06-27 PROCEDURE — 8010000000 HC HEMODIALYSIS ACUTE INPT

## 2025-06-27 PROCEDURE — 85025 COMPLETE CBC W/AUTO DIFF WBC: CPT

## 2025-06-27 PROCEDURE — 97530 THERAPEUTIC ACTIVITIES: CPT

## 2025-06-27 PROCEDURE — 36415 COLL VENOUS BLD VENIPUNCTURE: CPT

## 2025-06-27 PROCEDURE — 80048 BASIC METABOLIC PNL TOTAL CA: CPT

## 2025-06-27 PROCEDURE — 6370000000 HC RX 637 (ALT 250 FOR IP): Performed by: HOSPITALIST

## 2025-06-27 PROCEDURE — 94760 N-INVAS EAR/PLS OXIMETRY 1: CPT

## 2025-06-27 PROCEDURE — 51798 US URINE CAPACITY MEASURE: CPT

## 2025-06-27 PROCEDURE — 51701 INSERT BLADDER CATHETER: CPT

## 2025-06-27 PROCEDURE — 2500000003 HC RX 250 WO HCPCS: Performed by: HOSPITALIST

## 2025-06-27 PROCEDURE — 6370000000 HC RX 637 (ALT 250 FOR IP): Performed by: INTERNAL MEDICINE

## 2025-06-27 PROCEDURE — 97161 PT EVAL LOW COMPLEX 20 MIN: CPT

## 2025-06-27 RX ORDER — MIDODRINE HYDROCHLORIDE 10 MG/1
10 TABLET ORAL
Qty: 90 TABLET | Refills: 3 | Status: SHIPPED | OUTPATIENT
Start: 2025-06-27

## 2025-06-27 RX ADMIN — TAMSULOSIN HYDROCHLORIDE 0.4 MG: 0.4 CAPSULE ORAL at 12:01

## 2025-06-27 RX ADMIN — ESCITALOPRAM OXALATE 10 MG: 10 TABLET ORAL at 12:02

## 2025-06-27 RX ADMIN — SODIUM CHLORIDE, PRESERVATIVE FREE 10 ML: 5 INJECTION INTRAVENOUS at 12:08

## 2025-06-27 RX ADMIN — PREGABALIN 50 MG: 50 CAPSULE ORAL at 12:02

## 2025-06-27 RX ADMIN — METOPROLOL SUCCINATE 25 MG: 25 TABLET, EXTENDED RELEASE ORAL at 12:04

## 2025-06-27 RX ADMIN — DOCUSATE SODIUM 100 MG: 100 CAPSULE, LIQUID FILLED ORAL at 12:02

## 2025-06-27 RX ADMIN — SEVELAMER CARBONATE 800 MG: 800 TABLET, FILM COATED ORAL at 12:02

## 2025-06-27 RX ADMIN — HYDROCODONE BITARTRATE AND ACETAMINOPHEN 1 TABLET: 5; 325 TABLET ORAL at 15:26

## 2025-06-27 RX ADMIN — MIDODRINE HYDROCHLORIDE 10 MG: 5 TABLET ORAL at 16:25

## 2025-06-27 RX ADMIN — CALCITRIOL CAPSULES 0.25 MCG 0.25 MCG: 0.25 CAPSULE ORAL at 12:02

## 2025-06-27 RX ADMIN — SEVELAMER CARBONATE 800 MG: 800 TABLET, FILM COATED ORAL at 16:25

## 2025-06-27 RX ADMIN — DONEPEZIL HYDROCHLORIDE 10 MG: 10 TABLET ORAL at 12:03

## 2025-06-27 RX ADMIN — PANTOPRAZOLE SODIUM 40 MG: 40 TABLET, DELAYED RELEASE ORAL at 06:44

## 2025-06-27 RX ADMIN — FERROUS SULFATE TAB 325 MG (65 MG ELEMENTAL FE) 325 MG: 325 (65 FE) TAB at 12:02

## 2025-06-27 RX ADMIN — APIXABAN 5 MG: 5 TABLET, FILM COATED ORAL at 12:02

## 2025-06-27 RX ADMIN — MIDODRINE HYDROCHLORIDE 10 MG: 5 TABLET ORAL at 12:03

## 2025-06-27 ASSESSMENT — PAIN - FUNCTIONAL ASSESSMENT: PAIN_FUNCTIONAL_ASSESSMENT: PREVENTS OR INTERFERES SOME ACTIVE ACTIVITIES AND ADLS

## 2025-06-27 ASSESSMENT — PAIN DESCRIPTION - LOCATION: LOCATION: LEG;SHOULDER

## 2025-06-27 ASSESSMENT — PAIN SCALES - GENERAL
PAINLEVEL_OUTOF10: 7
PAINLEVEL_OUTOF10: 0

## 2025-06-27 ASSESSMENT — PAIN DESCRIPTION - ORIENTATION: ORIENTATION: RIGHT;LEFT

## 2025-06-27 ASSESSMENT — PAIN DESCRIPTION - DESCRIPTORS: DESCRIPTORS: ACHING

## 2025-06-27 NOTE — DIALYSIS
[facility] INPATIENT SERVICES  DIALYSIS TREATMENT SUMMARY      Note: Consult with the attending physician for patient treatment orders, this document is not a physician order.      Patient Information   Patient Cholo Bhakta   Date of Birth June 18, 1955   Chart Number [id number]   Location [facility] MRN [id number]   Gender Male   SSN (last 4) [id number]     Treatment Information   Treatment Type Hemodialysis   Treatment Id [id number]   Start Time June 27, 2025 07:57   End Time June 27, 2025 11:31   Acutal Duration 03:34     Treatment Balances   Total Saline Administered 500   Net Fluid Balance -1000    Hemodialysis Orders   Therapy Standard   Orders Verified Time 06/27/2025 07:45    Date Verified 06/27/2025   Duration 3:30   Isolated UF/Bypass No   BFR (mL) 450   DFR (mL) 700   Dialyzer Type OPTIFLUX 160NR   UF Order UF Goal Ordered   UF Goal Ordered (mL) 1000   Crit-Line used No   Heparin Initial Units Bolus No   Heparin IV Maintenance Bolus No   Heparin IV Infusion No   Potassium (mEq/L) 3   Calcium (mEq/L) 2.5   Bicarb (mg/dL) 35   Sodium (mEq/L) 138   Clinician Stephanie Wilson, RN    Dialysis Access   Access Type Central Venous Catheter   Central Venous Catheter   Access Type Catheter - Tunneled   Access Location Chest Wall - R   Current/New [facility] Patient Yes   1st Use Catheter Verified by Previous Use   Catheter Care Completed per Policy Yes   Dressing Dry and Intact on Arrival Yes   Dressing Changed Yes   Type of Dressing Film Biopatch/CHG   Dressing Changed By [facility] Staff   Type of HD Caps Curos   HD Caps Changed Yes   CVC Line Education Provided Yes - Infection Prevention      Vitals   Pre-Treatment Vitals   Time Is BP being recorded? Pre BP Map BP Method Pulse RR Temp How was Weight Obtained? Pre Weight Previous Dry Weight Previous Post Weight Metric Target Fluid Removal (mL) Dialysate Confirmed Clinician    06/27/2025 07:54 BP/Map 123/62 (82) Noninvasive 57 17 96.8 How Obtained:

## 2025-06-27 NOTE — CARE COORDINATION
[facility] by [facility]  P:  [phone]  F:  [phone]  Will accept pt and will be out on 7/1/25 to see pt due to pt dialysis on Monday.   Electronically signed by PAM HUBER on 6/27/2025 at 2:47 PM

## 2025-06-27 NOTE — DISCHARGE SUMMARY
Discharge Summary    Date:6/27/2025        Patient Name:Cholo Bhakta     YOB: 1955     Age:70 y.o.    Admit Date:6/23/2025   Admission Condition:fair   Discharged Condition:stable  Discharge Date: 06/27/25       Discharge Diagnoses   Principal Problem:    AMS (altered mental status)  Active Problems:    ESRD (end stage renal disease) on dialysis (HCC)    Essential hypertension    Diabetes mellitus (HCC)    Memory loss    GERD (gastroesophageal reflux disease)    BPH (benign prostatic hyperplasia)    Falls    Seizure-like activity (HCC)    Anxiety    Hyperlipidemia, unspecified  Resolved Problems:    * No resolved hospital problems. *      Hospital Stay     Narrative of Hospital Course:     70 year old male with CHF, ESRD on dialysis, diabetes, hyperlipidemia, HTN who presents to ED for further evaluation following a fall.  Wife is present at bedside who reports that when they arrived home from his dialysis treatment patient complained of a headache and fell getting out of his truck. EMS was called to assist in getting patient up and when they arrived patient had a seizure. In ED: EKG with sinus tachycardia ; XR pelvis with no visible fractures; CXR with no acute cardiopulmonary disease; CT cervical spine with no acute fracture; CT head with no acute intracranial findings; UA with moderate LE, present yeast, 31-50 WBC, urine culture with candida and Enterococcus faecum light growth. Admitted in house for neuro and nephro eval. Patient denied any dysuria and no evidence of suprapubic pain, afebrile in house hence not treated with antibiotics. Seen by neurology, syncope post dialysis in setting of orthostatic hypotension hx. Doubt epileptic seizures. EEG obtained. Patient midodrine increased to 10mg TID, isordil discontinued. To follow up with PCP outpt.      Physical Examination:  General: no acute distress lying comfortably in bed.  HEENT: Atraumatic normocephalic, range of motion normal, no JVD,

## 2025-06-27 NOTE — PROGRESS NOTES
Devices: Call light within reach, Bed alarm in place, Telesitter in use, Gait belt, Left in bed    Restrictions  Position Activity Restriction  Other Position/Activity Restrictions: CHARATING FROM PREVIOUS ADMISSION STATES NWB ON L LE . Pt WAS UNSURE IF HE WAS STILL NWB OR NOT, BUT DID PROCEED TO PERFORM MOBILITY NWB     Subjective  General  Family/Caregiver Present: No  Diagnosis: AMS. FALL, ESRD (HD)  Subjective  Subjective: pt WAS AGREEABLE TO WORK WITH THERAPY. NO REPORT OF PAIN         Social/Functional History  Social/Functional History  Lives With: Spouse  Type of Home: House  Home Layout: One level  Home Access: Ramped entrance  Bathroom Shower/Tub: Walk-in shower  Bathroom Toilet: Handicap height  Bathroom Equipment: Grab bars in shower, Toilet raiser  Bathroom Accessibility: Accessible  Home Equipment: Wheelchair - Manual, Walker - Rolling  Receives Help From: Family  Prior Level of Assist for ADLs: Needs assistance  Prior Level of Assist for Homemaking: Needs assistance  Homemaking Responsibilities: No  Prior Level of Assist for Transfers: Independent  Active : No  Patient's  Info: spouse  Mode of Transportation: Car  Occupation: Retired  Vision/Hearing       Cognition   Cognition  Cognition Comment: ALERT,FOLLOWING SIMPLE DIRECTIONS. UNABLE TO RECALL RECENT EVENTS    Objective  Temp: 96.8 °F (36 °C)  Pulse: 75  Heart Rate Source: Monitor  Respirations: 16  SpO2: 95 %  O2 Device: None (Room air)  BP: (!) 118/55  MAP (Calculated): 76  BP Location: Left lower arm  Patient Position: Supine     Observation/Palpation  Observation: BANDAGE ON L FOOT          AROM RLE (degrees)  RLE AROM: WFL  AROM LLE (degrees)  LLE AROM : WFL  Strength Other  Other: LE'S GROSSLY ANTIGRAVITY           Bed mobility  Supine to Sit: Stand by assistance  Sit to Supine: Stand by assistance  Transfers  Sit to Stand: Contact guard assistance  Stand to Sit: Contact guard assistance  Comment: pt STOOD WITH RW AND OBSERVED  HIM BEING NWB ON L LE. Pt ATTEMPTED TO TF BED>RECLINER BUT SUDDENLY SAT BACK ON BED AND STATED \"I AM TOO WEAK\". ALLOWED pt TO REST AND THEN STOOD AGAIN. ABLE TO STAND APPROX 10 SEC AND THEN STATED HE WAS TOO WEAK TO ATTEMPT A PIVOT TF.                        Goals  Short Term Goals  Time Frame for Short Term Goals: 2 WKS  Short Term Goal 1: TF'S WITH RW OR SQUAT PIVOT, CGA       Education  Patient Education  Education Given To: Patient;Family  Education Provided: Role of Therapy;Precautions;Transfer Training;Mobility Training  Education Provided Comments: pt INSTRUCTED IN THE USE OF THE RW  Education Method: Demonstration;Verbal  Barriers to Learning: Cognition  Education Outcome: Continued education needed      Therapy Time   Individual Concurrent Group Co-treatment   Time In           Time Out           Minutes                   Deidre Damico PT       Electronically signed by Deidre Damico PT on 6/27/2025 at 1:41 PM

## 2025-06-27 NOTE — CARE COORDINATION
06/27/25 0949   Readmission Assessment   Number of Days since last admission? 8-30 days   Previous Disposition Home with Family   Who is being Interviewed Caregiver   What was the patient's/caregiver's perception as to why they think they needed to return back to the hospital? Other (Comment)  (AMS)   Did you visit your Primary Care Physician after you left the hospital, before you returned this time? Yes   Did you see a specialist, such as Cardiac, Pulmonary, Orthopedic Physician, etc. after you left the hospital? No   Who advised the patient to return to the hospital? Self-referral   Does the patient report anything that got in the way of taking their medications? No   In our efforts to provide the best possible care to you and others like you, can you think of anything that we could have done to help you after you left the hospital the first time, so that you might not have needed to return so soon? Arrange for more help when leaving the hospital;Additional Community resources available for illness support;Teach back instructions regarding management of illness     Electronically signed by GORDY Angeles on 6/27/2025 at 9:50 AM

## 2025-06-27 NOTE — CARE COORDINATION
06/27/25 1254   IMM Letter   IMM Letter given to Patient/Family/Significant other/Guardian/POA/by: GORDY Angeles   IMM Letter date given: 06/27/25   IMM Letter time given: 1230     First IMM given and explained to patient.  All questions answered.  Patient upgraded from observation to inpatient.  Signed copy placed in patient soft chart.   Electronically signed by GORDY Angeles on 6/27/2025 at 12:55 PM

## 2025-06-27 NOTE — PROGRESS NOTES
Renal progress Note    Thank you to requesting provider: Dr Fairchild, for asking us to see Cholo Bhakta    Reason for consultation: ESRD    Chief Complaint: AMS     History of Presenting Illness      Patient is a 70-year-old a pleasant man with a past medical history of CHF, hypertension, type 2 diabetes, CAD, morbid obesity, and ESRD.  He has recently started chronic maintenance hemodialysis.  In [id number], he was status post dialysis on June 23.  Patient reported hypotension, fatigue and headaches after dialysis.  He reported on June 23 to the ED, after he sustained an episode of seizure with change in mental status.  He even had a fall without any major head trauma.  He has generalized weakness.  Renal service was consulted to manage his ESRD.  Today patient was seen and examined during dialysis. His BPs have improved after IV fluids.    Pt was seen on RRT  Modality: Hemodialysis  Access: Catheter  Location: right upper  QB: 450  QD: 700  UF: 1000 ml    Past Medical/Surgical History      Active Ambulatory Problems     Diagnosis Date Noted    Systolic CHF, chronic (HCC) 02/21/2025    Volume overload 02/21/2025    ESRD (end stage renal disease) on dialysis (HCC) 02/21/2025    Cardiorenal syndrome 02/21/2025    Chronic obstructive pulmonary disease (HCC) 12/03/2024    Essential hypertension 05/03/2017    Diabetes mellitus (HCC) 02/22/2025    CAD (coronary artery disease) 01/20/2023    Diabetic ulcer of left foot associated with type 2 diabetes mellitus (HCC) 08/31/2020    Gout 03/06/2025    Memory loss 03/06/2025    GERD (gastroesophageal reflux disease) 03/06/2025    BPH (benign prostatic hyperplasia) 03/06/2025    Neuropathy 03/06/2025    Palliative care patient 03/07/2025    Severe malnutrition 06/03/2025    Fall 06/03/2025    General weakness 06/23/2025    Atrial fibrillation (HCC) 05/06/2022    Chronic constipation 10/06/2020     Resolved Ambulatory Problems     Diagnosis Date Noted    Acute on chronic

## 2025-06-27 NOTE — CARE COORDINATION
Case Management Assessment  Initial Evaluation    Date/Time of Evaluation: 6/27/2025 12:57 PM  Assessment Completed by: GORDY Angeles    If patient is discharged prior to next notation, then this note serves as note for discharge by case management.    Patient Name: Cholo Bhakta                   YOB: 1955  Diagnosis: Fall, initial encounter [W19.XXXA]  Altered mental status, unspecified altered mental status type [R41.82]  AMS (altered mental status) [R41.82]                   Date / Time: 6/23/2025  6:50 PM    Patient Admission Status: Inpatient   Readmission Risk (Low < 19, Mod (19-27), High > 27): Readmission Risk Score: 30.3    Current PCP: Nirmal Nuno MD  PCP verified by CM? Yes    Chart Reviewed: Yes      History Provided by: Patient  Patient Orientation: Alert and Oriented    Patient Cognition: Alert    Hospitalization in the last 30 days (Readmission):  Yes    If yes, Readmission Assessment in CM Navigator will be completed.    Advance Directives:      Code Status: Full Code   Patient's Primary Decision Maker is: Legal Next of Kin    Primary Decision Maker (Active): Dee Bhakta - Spouse - [phone]    Discharge Planning:    Patient lives with: Spouse/Significant Other Type of Home: House  Primary Care Giver: Spouse  Patient Support Systems include: Spouse/Significant Other, Family Members   Current Financial resources: Medicare  Current community resources: None  Current services prior to admission: Durable Medical Equipment            Current DME: Walker, Wheelchair            Type of Home Care services:  Nursing Services, Skilled Therapy, OT, PT    ADLS  Prior functional level: Assistance with the following:, Mobility  Current functional level: Assistance with the following:, Mobility    PT AM-PAC:   /24  OT AM-PAC:   /24    Family can provide assistance at DC: Yes (spouse)  Would you like Case Management to discuss the discharge plan with any other family members/significant  others, and if so, who? Yes (spouse)  Plans to Return to Present Housing: Yes  Other Identified Issues/Barriers to RETURNING to current housing: na  Potential Assistance needed at discharge: Home Care            Potential DME:    Patient expects to discharge to: House  Plan for transportation at discharge: Family    Financial    Payor: [facility] MEDICARE / Plan: [facility] MEDICARE ADVANTAGE / Product Type: *No Product type* /     Does insurance require precert for SNF: Yes    Potential assistance Purchasing Medications: No  Meds-to-Beds request:        [id number] [place] - [address] [phone] - F [phone] [address] [place] [address]  Phone: [phone] Fax: [phone]      Notes:    Factors facilitating achievement of predicted outcomes: Family support, Motivated, and Cooperative    Barriers to discharge: Decreased endurance and Long standing deficits    Additional Case Management Notes: SW met with pt to discuss dc planning, pt stated he will return home at discharge and wants HH, he denies any other needs and will not go back to a SNF    The Plan for Transition of Care is related to the following treatment goals of Fall, initial encounter [W19.XXXA]  Altered mental status, unspecified altered mental status type [R41.82]  AMS (altered mental status) [R41.82]    IF APPLICABLE: The Patient and/or patient representative Cholo and his family were provided with a choice of provider and agrees with the discharge plan. Freedom of choice list with basic dialogue that supports the patient's individualized plan of care/goals and shares the quality data associated with the providers was provided to: Patient   Patient Representative Name:       The Patient and/or Patient Representative Agree with the Discharge Plan? Yes    GORDY Angeles  Case Management Department  Ph: [phone] Fax: [phone]

## 2025-06-27 NOTE — CARE COORDINATION
[facility]  Patient has been set up with [facility].  Please fax discharge summary/AVS at time of discharge.  [facility]  P:  [phone]  F:  [phone]

## 2025-06-28 NOTE — PROGRESS NOTES
New dressing placed to left foot. Discharge instructions reviewed with patient, wife, and son. Transport contacted.

## 2025-06-30 NOTE — PROGRESS NOTES
Physician Progress Note      PATIENT:               CHELSIE ORTIZ  [facility] #:                  [id number]  :                       1955  ADMIT DATE:       2025 7:50 PM  DISCH DATE:        2025 8:22 PM  RESPONDING  PROVIDER #:        Day Gracia MD          QUERY TEXT:    Pressure ulcer stage 2 upper bilateral Buttocks per wound care nurse   evaluations on 25.  Please specify the present on admission status and/or   stage:    The clinical indicators include:  -PMH: type 2 diabetes, CAD, morbid obesity, and ESRD.  He has recently started   chronic maintenance hemodialysis.  70-year-old a pleasant man (Dr. Rosa,   25)  -Wound Nurse consult note: Wound Identification: Wound Type: diabetic,   pressure, and skin tear. Contributing Factors: diabetes, chronic pressure, and   decreased mobility.  The bilateral upper buttocks both have a stage 2   pressure injury present. There is slough in the wound bed with a small amount   of yellow colored drainage. Current Wound care treatment: Buttocks/Coccyx:   Cleanse with soap and water. Apply Triad Hydrophilic Wound Dressing cream to   wounds and surrounding skin. Re-apply twice daily and PRN (25-Roselyn Garcia RN)  -MAR: Dakins external Solution BID;  -Orders: Adult oral nutritional supplements Breakfast and lunch; Turn and   reposition q2 hours; skin assessments per unit guidelines; Pad/offload medical   devices;  Options provided:  -- Stage 2 Pressure Ulcer present on admission  -- Stage 2 Pressure Ulcer not present on admission  -- Other - I will add my own diagnosis  -- Disagree - Not applicable / Not valid  -- Disagree - Clinically unable to determine / Unknown  -- Refer to Clinical Documentation Reviewer    PROVIDER RESPONSE TEXT:    Provider is clinically unable to determine a response to this query.    Query created by: Shiloh Sparks on 2025 11:15 AM      QUERY TEXT:    Syncope is documented in the medical record Dr. Gracia

## 2025-07-01 NOTE — PROCEDURES
[id number] [place] [address]                       ELECTROENCEPHALOGRAM REPORT      PATIENT NAME: CHELSIE ORTIZ                  : 1955  MED REC NO: [id number]                          ROOM: 0402  ACCOUNT NO: [id number]                       ADMIT DATE: 2025  PROVIDER: Scarlett Faith MD      DATE OF SERVICE:  2025    PROCEDURE:  Electroencephalogram.    INDICATION FOR TEST:  Altered mental status.    DESCRIPTION:  Waking background consists of a rhythmic and symmetric well-formed and well regulated posterior dominant 8 hertz activity.  During drowsiness, background frequency decreased by 2 to 3 hertz.  Photic stimulation produced no abnormalities.  No epileptiform discharges nor any focal lateralizing slowing was noted.    IMPRESSION:  Normal awake and drowsy electroencephalogram.  Correlate clinically.          SCARLETT FAITH MD      D:  2025 10:18:29     T:  2025 10:27:39     BILLY/SHANNA  Job #:  [id number]     Doc#:  [id number]
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
## 2025-08-06 ENCOUNTER — HOSPITAL ENCOUNTER (EMERGENCY)
Age: 70
Discharge: LEFT BEFORE DISCHARGE | End: 2025-08-06

## 2025-08-06 VITALS
BODY MASS INDEX: 34.71 KG/M2 | SYSTOLIC BLOOD PRESSURE: 130 MMHG | RESPIRATION RATE: 18 BRPM | TEMPERATURE: 97.4 F | DIASTOLIC BLOOD PRESSURE: 55 MMHG | HEART RATE: 92 BPM | OXYGEN SATURATION: 98 % | WEIGHT: 256 LBS

## 2025-08-11 ENCOUNTER — HOSPITAL ENCOUNTER (INPATIENT)
Age: 70
LOS: 2 days | Discharge: HOME | DRG: 391 | End: 2025-08-13
Attending: STUDENT IN AN ORGANIZED HEALTH CARE EDUCATION/TRAINING PROGRAM | Admitting: STUDENT IN AN ORGANIZED HEALTH CARE EDUCATION/TRAINING PROGRAM
Payer: MEDICARE

## 2025-08-11 ENCOUNTER — APPOINTMENT (OUTPATIENT)
Dept: CT IMAGING | Age: 70
DRG: 391 | End: 2025-08-11
Payer: MEDICARE

## 2025-08-11 ENCOUNTER — APPOINTMENT (OUTPATIENT)
Dept: GENERAL RADIOLOGY | Age: 70
DRG: 391 | End: 2025-08-11
Payer: MEDICARE

## 2025-08-11 DIAGNOSIS — I20.9 ANGINA PECTORIS: ICD-10-CM

## 2025-08-11 DIAGNOSIS — R11.2 NAUSEA AND VOMITING, UNSPECIFIED VOMITING TYPE: ICD-10-CM

## 2025-08-11 DIAGNOSIS — R53.1 GENERALIZED WEAKNESS: ICD-10-CM

## 2025-08-11 DIAGNOSIS — R07.9 CHEST PAIN, UNSPECIFIED TYPE: Primary | ICD-10-CM

## 2025-08-11 LAB
ALBUMIN SERPL-MCNC: 4 G/DL (ref 3.5–5.2)
ALP SERPL-CCNC: 93 U/L (ref 40–129)
ALT SERPL-CCNC: 11 U/L (ref 10–50)
ANION GAP SERPL CALCULATED.3IONS-SCNC: 24 MMOL/L (ref 8–16)
ANTI-XA UNFRAC HEPARIN: <0.1 IU/ML
APTT PPP: 28 SEC (ref 26–36.2)
AST SERPL-CCNC: 31 U/L (ref 10–50)
B PARAP IS1001 DNA NPH QL NAA+NON-PROBE: NOT DETECTED
B PERT.PT PRMT NPH QL NAA+NON-PROBE: NOT DETECTED
BASE EXCESS VENOUS: 6 MMOL/L
BASOPHILS # BLD: 0.1 K/UL (ref 0–0.2)
BASOPHILS NFR BLD: 1 % (ref 0–1)
BILIRUB SERPL-MCNC: 0.7 MG/DL (ref 0.2–1.2)
BUN SERPL-MCNC: 16 MG/DL (ref 8–23)
C PNEUM DNA NPH QL NAA+NON-PROBE: NOT DETECTED
CALCIUM SERPL-MCNC: 9.4 MG/DL (ref 8.8–10.2)
CARBOXYHEMOGLOBIN: 2.1 %
CHLORIDE SERPL-SCNC: 91 MMOL/L (ref 98–107)
CHP ED QC CHECK: YES
CO2 SERPL-SCNC: 20 MMOL/L (ref 22–29)
CREAT SERPL-MCNC: 2.6 MG/DL (ref 0.7–1.2)
EKG P AXIS: 81 DEGREES
EKG P-R INTERVAL: 186 MS
EKG Q-T INTERVAL: 372 MS
EKG QRS DURATION: 126 MS
EKG QTC CALCULATION (BAZETT): 452 MS
EKG T AXIS: 89 DEGREES
EOSINOPHIL # BLD: 0.2 K/UL (ref 0–0.6)
EOSINOPHIL NFR BLD: 2.4 % (ref 0–5)
ERYTHROCYTE [DISTWIDTH] IN BLOOD BY AUTOMATED COUNT: 14 % (ref 11.5–14.5)
FLUAV RNA NPH QL NAA+NON-PROBE: NOT DETECTED
FLUBV RNA NPH QL NAA+NON-PROBE: NOT DETECTED
GLUCOSE BLD-MCNC: 260 MG/DL
GLUCOSE BLD-MCNC: 260 MG/DL (ref 70–99)
GLUCOSE SERPL-MCNC: 306 MG/DL (ref 70–99)
HADV DNA NPH QL NAA+NON-PROBE: NOT DETECTED
HCO3 VENOUS: 31 MMOL/L (ref 23–29)
HCOV 229E RNA NPH QL NAA+NON-PROBE: NOT DETECTED
HCOV HKU1 RNA NPH QL NAA+NON-PROBE: NOT DETECTED
HCOV NL63 RNA NPH QL NAA+NON-PROBE: NOT DETECTED
HCOV OC43 RNA NPH QL NAA+NON-PROBE: NOT DETECTED
HCT VFR BLD AUTO: 36.8 % (ref 42–52)
HGB BLD-MCNC: 12.8 G/DL (ref 14–18)
HMPV RNA NPH QL NAA+NON-PROBE: NOT DETECTED
HPIV1 RNA NPH QL NAA+NON-PROBE: NOT DETECTED
HPIV2 RNA NPH QL NAA+NON-PROBE: NOT DETECTED
HPIV3 RNA NPH QL NAA+NON-PROBE: NOT DETECTED
HPIV4 RNA NPH QL NAA+NON-PROBE: NOT DETECTED
IMM GRANULOCYTES # BLD: 0 K/UL
INR PPP: 1.06 (ref 0.88–1.18)
LIPASE SERPL-CCNC: 9 U/L (ref 13–60)
LYMPHOCYTES # BLD: 1.2 K/UL (ref 1.1–4.5)
LYMPHOCYTES NFR BLD: 17.5 % (ref 20–40)
M PNEUMO DNA NPH QL NAA+NON-PROBE: NOT DETECTED
MCH RBC QN AUTO: 30.6 PG (ref 27–31)
MCHC RBC AUTO-ENTMCNC: 34.8 G/DL (ref 33–37)
MCV RBC AUTO: 88 FL (ref 80–94)
METHEMOGLOBIN VENOUS: 1.1 %
MONOCYTES # BLD: 0.7 K/UL (ref 0–0.9)
MONOCYTES NFR BLD: 9.3 % (ref 0–10)
NEUTROPHILS # BLD: 4.9 K/UL (ref 1.5–7.5)
NEUTS SEG NFR BLD: 69.5 % (ref 50–65)
O2 CONTENT, VEN: 11 ML/DL
O2 SAT, VEN: 60 %
O2 THERAPY: ABNORMAL
PCO2 VENOUS: 48 MMHG (ref 40–50)
PERFORMED ON: ABNORMAL
PH VENOUS: 7.42 (ref 7.35–7.45)
PLATELET # BLD AUTO: 204 K/UL (ref 130–400)
PMV BLD AUTO: 12.2 FL (ref 9.4–12.4)
PO2 VENOUS: 31 MMHG
POTASSIUM SERPL-SCNC: 3.4 MMOL/L (ref 3.5–5.1)
PROT SERPL-MCNC: 7.4 G/DL (ref 6.4–8.3)
PROTHROMBIN TIME: 13.8 SEC (ref 12–14.6)
RBC # BLD AUTO: 4.18 M/UL (ref 4.7–6.1)
RSV RNA NPH QL NAA+NON-PROBE: NOT DETECTED
RV+EV RNA NPH QL NAA+NON-PROBE: NOT DETECTED
SARS-COV-2 RNA NPH QL NAA+NON-PROBE: NOT DETECTED
SODIUM SERPL-SCNC: 135 MMOL/L (ref 136–145)
TROPONIN, HIGH SENSITIVITY: 117 NG/L (ref 0–22)
TROPONIN, HIGH SENSITIVITY: 119 NG/L (ref 0–22)
WBC # BLD AUTO: 7.1 K/UL (ref 4.8–10.8)

## 2025-08-11 PROCEDURE — 0202U NFCT DS 22 TRGT SARS-COV-2: CPT

## 2025-08-11 PROCEDURE — 6370000000 HC RX 637 (ALT 250 FOR IP): Performed by: STUDENT IN AN ORGANIZED HEALTH CARE EDUCATION/TRAINING PROGRAM

## 2025-08-11 PROCEDURE — 82800 BLOOD PH: CPT

## 2025-08-11 PROCEDURE — 6360000002 HC RX W HCPCS: Performed by: STUDENT IN AN ORGANIZED HEALTH CARE EDUCATION/TRAINING PROGRAM

## 2025-08-11 PROCEDURE — 93005 ELECTROCARDIOGRAM TRACING: CPT | Performed by: STUDENT IN AN ORGANIZED HEALTH CARE EDUCATION/TRAINING PROGRAM

## 2025-08-11 PROCEDURE — 70450 CT HEAD/BRAIN W/O DYE: CPT

## 2025-08-11 PROCEDURE — 2500000003 HC RX 250 WO HCPCS: Performed by: STUDENT IN AN ORGANIZED HEALTH CARE EDUCATION/TRAINING PROGRAM

## 2025-08-11 PROCEDURE — 74176 CT ABD & PELVIS W/O CONTRAST: CPT

## 2025-08-11 PROCEDURE — 84484 ASSAY OF TROPONIN QUANT: CPT

## 2025-08-11 PROCEDURE — 96375 TX/PRO/DX INJ NEW DRUG ADDON: CPT

## 2025-08-11 PROCEDURE — 85025 COMPLETE CBC W/AUTO DIFF WBC: CPT

## 2025-08-11 PROCEDURE — 83690 ASSAY OF LIPASE: CPT

## 2025-08-11 PROCEDURE — 36415 COLL VENOUS BLD VENIPUNCTURE: CPT

## 2025-08-11 PROCEDURE — 80053 COMPREHEN METABOLIC PANEL: CPT

## 2025-08-11 PROCEDURE — 96374 THER/PROPH/DIAG INJ IV PUSH: CPT

## 2025-08-11 PROCEDURE — 1200000000 HC SEMI PRIVATE

## 2025-08-11 PROCEDURE — 99285 EMERGENCY DEPT VISIT HI MDM: CPT

## 2025-08-11 PROCEDURE — 71045 X-RAY EXAM CHEST 1 VIEW: CPT

## 2025-08-11 RX ORDER — SODIUM CHLORIDE 9 MG/ML
INJECTION, SOLUTION INTRAVENOUS PRN
Status: DISCONTINUED | OUTPATIENT
Start: 2025-08-11 | End: 2025-08-13 | Stop reason: HOSPADM

## 2025-08-11 RX ORDER — ACETAMINOPHEN 325 MG/1
650 TABLET ORAL EVERY 6 HOURS PRN
Status: DISCONTINUED | OUTPATIENT
Start: 2025-08-11 | End: 2025-08-13 | Stop reason: HOSPADM

## 2025-08-11 RX ORDER — BISACODYL 10 MG
10 SUPPOSITORY, RECTAL RECTAL DAILY PRN
Status: DISCONTINUED | OUTPATIENT
Start: 2025-08-11 | End: 2025-08-13 | Stop reason: HOSPADM

## 2025-08-11 RX ORDER — FAMOTIDINE 20 MG/1
20 TABLET, FILM COATED ORAL DAILY
Status: DISCONTINUED | OUTPATIENT
Start: 2025-08-12 | End: 2025-08-13 | Stop reason: HOSPADM

## 2025-08-11 RX ORDER — HEPARIN SODIUM 1000 [USP'U]/ML
4000 INJECTION, SOLUTION INTRAVENOUS; SUBCUTANEOUS PRN
Status: DISCONTINUED | OUTPATIENT
Start: 2025-08-11 | End: 2025-08-13

## 2025-08-11 RX ORDER — HEPARIN SODIUM 1000 [USP'U]/ML
2000 INJECTION, SOLUTION INTRAVENOUS; SUBCUTANEOUS PRN
Status: DISCONTINUED | OUTPATIENT
Start: 2025-08-11 | End: 2025-08-13

## 2025-08-11 RX ORDER — ONDANSETRON 4 MG/1
4 TABLET, ORALLY DISINTEGRATING ORAL EVERY 8 HOURS PRN
Status: DISCONTINUED | OUTPATIENT
Start: 2025-08-11 | End: 2025-08-13 | Stop reason: HOSPADM

## 2025-08-11 RX ORDER — ONDANSETRON 2 MG/ML
4 INJECTION INTRAMUSCULAR; INTRAVENOUS EVERY 6 HOURS PRN
Status: DISCONTINUED | OUTPATIENT
Start: 2025-08-11 | End: 2025-08-13 | Stop reason: HOSPADM

## 2025-08-11 RX ORDER — POLYETHYLENE GLYCOL 3350 17 G/17G
17 POWDER, FOR SOLUTION ORAL DAILY PRN
Status: DISCONTINUED | OUTPATIENT
Start: 2025-08-11 | End: 2025-08-13 | Stop reason: HOSPADM

## 2025-08-11 RX ORDER — MORPHINE SULFATE 4 MG/ML
4 INJECTION, SOLUTION INTRAMUSCULAR; INTRAVENOUS ONCE
Status: COMPLETED | OUTPATIENT
Start: 2025-08-11 | End: 2025-08-11

## 2025-08-11 RX ORDER — HEPARIN SODIUM 10000 [USP'U]/100ML
5-30 INJECTION, SOLUTION INTRAVENOUS CONTINUOUS
Status: DISCONTINUED | OUTPATIENT
Start: 2025-08-11 | End: 2025-08-13

## 2025-08-11 RX ORDER — POTASSIUM CHLORIDE 1500 MG/1
40 TABLET, EXTENDED RELEASE ORAL PRN
Status: DISCONTINUED | OUTPATIENT
Start: 2025-08-11 | End: 2025-08-13 | Stop reason: HOSPADM

## 2025-08-11 RX ORDER — GLUCAGON 1 MG/ML
1 KIT INJECTION PRN
Status: DISCONTINUED | OUTPATIENT
Start: 2025-08-11 | End: 2025-08-13 | Stop reason: HOSPADM

## 2025-08-11 RX ORDER — SODIUM CHLORIDE 0.9 % (FLUSH) 0.9 %
5-40 SYRINGE (ML) INJECTION EVERY 12 HOURS SCHEDULED
Status: DISCONTINUED | OUTPATIENT
Start: 2025-08-11 | End: 2025-08-13 | Stop reason: HOSPADM

## 2025-08-11 RX ORDER — HEPARIN SODIUM 1000 [USP'U]/ML
2000 INJECTION, SOLUTION INTRAVENOUS; SUBCUTANEOUS PRN
Status: DISCONTINUED | OUTPATIENT
Start: 2025-08-11 | End: 2025-08-11

## 2025-08-11 RX ORDER — MAGNESIUM SULFATE IN WATER 40 MG/ML
2000 INJECTION, SOLUTION INTRAVENOUS PRN
Status: DISCONTINUED | OUTPATIENT
Start: 2025-08-11 | End: 2025-08-13 | Stop reason: HOSPADM

## 2025-08-11 RX ORDER — HEPARIN SODIUM 1000 [USP'U]/ML
4000 INJECTION, SOLUTION INTRAVENOUS; SUBCUTANEOUS PRN
Status: DISCONTINUED | OUTPATIENT
Start: 2025-08-11 | End: 2025-08-11

## 2025-08-11 RX ORDER — DEXTROSE MONOHYDRATE 100 MG/ML
INJECTION, SOLUTION INTRAVENOUS CONTINUOUS PRN
Status: DISCONTINUED | OUTPATIENT
Start: 2025-08-11 | End: 2025-08-13 | Stop reason: HOSPADM

## 2025-08-11 RX ORDER — ONDANSETRON 2 MG/ML
4 INJECTION INTRAMUSCULAR; INTRAVENOUS ONCE
Status: COMPLETED | OUTPATIENT
Start: 2025-08-11 | End: 2025-08-11

## 2025-08-11 RX ORDER — HEPARIN SODIUM 10000 [USP'U]/100ML
5-30 INJECTION, SOLUTION INTRAVENOUS CONTINUOUS
Status: DISCONTINUED | OUTPATIENT
Start: 2025-08-11 | End: 2025-08-11

## 2025-08-11 RX ORDER — HEPARIN SODIUM 1000 [USP'U]/ML
4000 INJECTION, SOLUTION INTRAVENOUS; SUBCUTANEOUS ONCE
Status: DISCONTINUED | OUTPATIENT
Start: 2025-08-11 | End: 2025-08-11

## 2025-08-11 RX ORDER — SODIUM CHLORIDE 0.9 % (FLUSH) 0.9 %
5-40 SYRINGE (ML) INJECTION PRN
Status: DISCONTINUED | OUTPATIENT
Start: 2025-08-11 | End: 2025-08-13 | Stop reason: HOSPADM

## 2025-08-11 RX ORDER — INSULIN LISPRO 100 [IU]/ML
0-16 INJECTION, SOLUTION INTRAVENOUS; SUBCUTANEOUS
Status: DISCONTINUED | OUTPATIENT
Start: 2025-08-11 | End: 2025-08-13 | Stop reason: HOSPADM

## 2025-08-11 RX ORDER — INSULIN GLARGINE 100 [IU]/ML
20 INJECTION, SOLUTION SUBCUTANEOUS 2 TIMES DAILY
Status: DISCONTINUED | OUTPATIENT
Start: 2025-08-11 | End: 2025-08-13 | Stop reason: HOSPADM

## 2025-08-11 RX ORDER — POTASSIUM CHLORIDE 7.45 MG/ML
10 INJECTION INTRAVENOUS PRN
Status: DISCONTINUED | OUTPATIENT
Start: 2025-08-11 | End: 2025-08-13 | Stop reason: HOSPADM

## 2025-08-11 RX ADMIN — INSULIN GLARGINE 20 UNITS: 100 INJECTION, SOLUTION SUBCUTANEOUS at 23:49

## 2025-08-11 RX ADMIN — MORPHINE SULFATE 4 MG: 4 INJECTION, SOLUTION INTRAMUSCULAR; INTRAVENOUS at 21:43

## 2025-08-11 RX ADMIN — ONDANSETRON 4 MG: 2 INJECTION, SOLUTION INTRAMUSCULAR; INTRAVENOUS at 20:52

## 2025-08-11 RX ADMIN — SODIUM CHLORIDE, PRESERVATIVE FREE 10 ML: 5 INJECTION INTRAVENOUS at 23:45

## 2025-08-11 RX ADMIN — HEPARIN SODIUM AND DEXTROSE 8 UNITS/KG/HR: 10000; 5 INJECTION INTRAVENOUS at 23:44

## 2025-08-11 RX ADMIN — INSULIN LISPRO 8 UNITS: 100 INJECTION, SOLUTION INTRAVENOUS; SUBCUTANEOUS at 23:44

## 2025-08-11 ASSESSMENT — PAIN SCALES - GENERAL
PAINLEVEL_OUTOF10: 8
PAINLEVEL_OUTOF10: 9
PAINLEVEL_OUTOF10: 7
PAINLEVEL_OUTOF10: 7

## 2025-08-11 ASSESSMENT — ENCOUNTER SYMPTOMS
VOMITING: 1
EYE REDNESS: 0
ABDOMINAL PAIN: 0
NAUSEA: 1
CHEST TIGHTNESS: 0
COUGH: 0
EYE PAIN: 0
DIARRHEA: 0
SORE THROAT: 0
SHORTNESS OF BREATH: 0
BLOOD IN STOOL: 0
ABDOMINAL PAIN: 1

## 2025-08-11 ASSESSMENT — PAIN - FUNCTIONAL ASSESSMENT
PAIN_FUNCTIONAL_ASSESSMENT: 0-10

## 2025-08-12 ENCOUNTER — APPOINTMENT (OUTPATIENT)
Dept: NUCLEAR MEDICINE | Age: 70
DRG: 391 | End: 2025-08-12
Payer: MEDICARE

## 2025-08-12 LAB
ANION GAP SERPL CALCULATED.3IONS-SCNC: 18 MMOL/L (ref 8–16)
APTT PPP: 34.5 SEC (ref 26–36.2)
APTT PPP: 46.2 SEC (ref 26–36.2)
APTT PPP: 71 SEC (ref 26–36.2)
BASOPHILS # BLD: 0.1 K/UL (ref 0–0.2)
BASOPHILS NFR BLD: 1 % (ref 0–1)
BUN SERPL-MCNC: 22 MG/DL (ref 8–23)
CALCIUM SERPL-MCNC: 9.6 MG/DL (ref 8.8–10.2)
CHLORIDE SERPL-SCNC: 94 MMOL/L (ref 98–107)
CO2 SERPL-SCNC: 27 MMOL/L (ref 22–29)
CREAT SERPL-MCNC: 3.5 MG/DL (ref 0.7–1.2)
EOSINOPHIL # BLD: 0.3 K/UL (ref 0–0.6)
EOSINOPHIL NFR BLD: 4.8 % (ref 0–5)
ERYTHROCYTE [DISTWIDTH] IN BLOOD BY AUTOMATED COUNT: 13.8 % (ref 11.5–14.5)
GLUCOSE BLD-MCNC: 117 MG/DL (ref 70–99)
GLUCOSE BLD-MCNC: 129 MG/DL (ref 70–99)
GLUCOSE SERPL-MCNC: 91 MG/DL (ref 70–99)
HBA1C MFR BLD: 8.9 % (ref 4–5.6)
HCT VFR BLD AUTO: 36.4 % (ref 42–52)
HGB BLD-MCNC: 12.6 G/DL (ref 14–18)
IMM GRANULOCYTES # BLD: 0 K/UL
LYMPHOCYTES # BLD: 1.6 K/UL (ref 1.1–4.5)
LYMPHOCYTES NFR BLD: 21.7 % (ref 20–40)
MAGNESIUM SERPL-MCNC: 2.2 MG/DL (ref 1.6–2.4)
MCH RBC QN AUTO: 30.7 PG (ref 27–31)
MCHC RBC AUTO-ENTMCNC: 34.6 G/DL (ref 33–37)
MCV RBC AUTO: 88.8 FL (ref 80–94)
MONOCYTES # BLD: 0.8 K/UL (ref 0–0.9)
MONOCYTES NFR BLD: 11.7 % (ref 0–10)
NEUTROPHILS # BLD: 4.3 K/UL (ref 1.5–7.5)
NEUTS SEG NFR BLD: 60.4 % (ref 50–65)
PERFORMED ON: ABNORMAL
PERFORMED ON: ABNORMAL
PLATELET # BLD AUTO: 197 K/UL (ref 130–400)
PMV BLD AUTO: 11.2 FL (ref 9.4–12.4)
POTASSIUM SERPL-SCNC: 3 MMOL/L (ref 3.5–5)
RBC # BLD AUTO: 4.1 M/UL (ref 4.7–6.1)
SODIUM SERPL-SCNC: 139 MMOL/L (ref 136–145)
WBC # BLD AUTO: 7.2 K/UL (ref 4.8–10.8)

## 2025-08-12 PROCEDURE — 83036 HEMOGLOBIN GLYCOSYLATED A1C: CPT

## 2025-08-12 PROCEDURE — 82803 BLOOD GASES ANY COMBINATION: CPT

## 2025-08-12 PROCEDURE — 3430000000 HC RX DIAGNOSTIC RADIOPHARMACEUTICAL: Performed by: STUDENT IN AN ORGANIZED HEALTH CARE EDUCATION/TRAINING PROGRAM

## 2025-08-12 PROCEDURE — 85730 THROMBOPLASTIN TIME PARTIAL: CPT

## 2025-08-12 PROCEDURE — 6360000002 HC RX W HCPCS: Performed by: STUDENT IN AN ORGANIZED HEALTH CARE EDUCATION/TRAINING PROGRAM

## 2025-08-12 PROCEDURE — 78452 HT MUSCLE IMAGE SPECT MULT: CPT

## 2025-08-12 PROCEDURE — 94760 N-INVAS EAR/PLS OXIMETRY 1: CPT

## 2025-08-12 PROCEDURE — 97530 THERAPEUTIC ACTIVITIES: CPT

## 2025-08-12 PROCEDURE — 85025 COMPLETE CBC W/AUTO DIFF WBC: CPT

## 2025-08-12 PROCEDURE — 82962 GLUCOSE BLOOD TEST: CPT

## 2025-08-12 PROCEDURE — 85520 HEPARIN ASSAY: CPT

## 2025-08-12 PROCEDURE — 85610 PROTHROMBIN TIME: CPT

## 2025-08-12 PROCEDURE — 6370000000 HC RX 637 (ALT 250 FOR IP): Performed by: STUDENT IN AN ORGANIZED HEALTH CARE EDUCATION/TRAINING PROGRAM

## 2025-08-12 PROCEDURE — 97165 OT EVAL LOW COMPLEX 30 MIN: CPT

## 2025-08-12 PROCEDURE — 1200000000 HC SEMI PRIVATE

## 2025-08-12 PROCEDURE — A9502 TC99M TETROFOSMIN: HCPCS | Performed by: STUDENT IN AN ORGANIZED HEALTH CARE EDUCATION/TRAINING PROGRAM

## 2025-08-12 PROCEDURE — 80048 BASIC METABOLIC PNL TOTAL CA: CPT

## 2025-08-12 PROCEDURE — 97162 PT EVAL MOD COMPLEX 30 MIN: CPT

## 2025-08-12 PROCEDURE — 83735 ASSAY OF MAGNESIUM: CPT

## 2025-08-12 PROCEDURE — 36415 COLL VENOUS BLD VENIPUNCTURE: CPT

## 2025-08-12 RX ORDER — CALCITRIOL 0.25 UG/1
0.25 CAPSULE, LIQUID FILLED ORAL DAILY
Status: DISCONTINUED | OUTPATIENT
Start: 2025-08-12 | End: 2025-08-13 | Stop reason: HOSPADM

## 2025-08-12 RX ORDER — NITROGLYCERIN 0.4 MG/1
0.4 TABLET SUBLINGUAL EVERY 5 MIN PRN
Status: ACTIVE | OUTPATIENT
Start: 2025-08-12 | End: 2025-08-12

## 2025-08-12 RX ORDER — AMINOPHYLLINE 25 MG/ML
50 INJECTION, SOLUTION INTRAVENOUS PRN
Status: ACTIVE | OUTPATIENT
Start: 2025-08-12 | End: 2025-08-12

## 2025-08-12 RX ORDER — TAMSULOSIN HYDROCHLORIDE 0.4 MG/1
0.4 CAPSULE ORAL DAILY
Status: DISCONTINUED | OUTPATIENT
Start: 2025-08-12 | End: 2025-08-13 | Stop reason: HOSPADM

## 2025-08-12 RX ORDER — ATROPINE SULFATE 0.1 MG/ML
0.5 INJECTION INTRAVENOUS EVERY 5 MIN PRN
Status: ACTIVE | OUTPATIENT
Start: 2025-08-12 | End: 2025-08-12

## 2025-08-12 RX ORDER — ROSUVASTATIN CALCIUM 20 MG/1
10 TABLET, COATED ORAL NIGHTLY
Status: DISCONTINUED | OUTPATIENT
Start: 2025-08-12 | End: 2025-08-13 | Stop reason: HOSPADM

## 2025-08-12 RX ORDER — DONEPEZIL HYDROCHLORIDE 10 MG/1
10 TABLET, FILM COATED ORAL DAILY
Status: DISCONTINUED | OUTPATIENT
Start: 2025-08-12 | End: 2025-08-13 | Stop reason: HOSPADM

## 2025-08-12 RX ORDER — FERROUS SULFATE 325(65) MG
325 TABLET ORAL 2 TIMES DAILY
Status: DISCONTINUED | OUTPATIENT
Start: 2025-08-12 | End: 2025-08-13 | Stop reason: HOSPADM

## 2025-08-12 RX ORDER — MORPHINE SULFATE 2 MG/ML
2 INJECTION, SOLUTION INTRAMUSCULAR; INTRAVENOUS EVERY 4 HOURS PRN
Refills: 0 | Status: DISCONTINUED | OUTPATIENT
Start: 2025-08-12 | End: 2025-08-13 | Stop reason: HOSPADM

## 2025-08-12 RX ORDER — REGADENOSON 0.08 MG/ML
0.4 INJECTION, SOLUTION INTRAVENOUS
Status: COMPLETED | OUTPATIENT
Start: 2025-08-12 | End: 2025-08-12

## 2025-08-12 RX ORDER — NORTRIPTYLINE HYDROCHLORIDE 10 MG/1
10 CAPSULE ORAL NIGHTLY
Status: DISCONTINUED | OUTPATIENT
Start: 2025-08-12 | End: 2025-08-13 | Stop reason: HOSPADM

## 2025-08-12 RX ORDER — ALBUTEROL SULFATE 90 UG/1
2 INHALANT RESPIRATORY (INHALATION) PRN
Status: ACTIVE | OUTPATIENT
Start: 2025-08-12 | End: 2025-08-12

## 2025-08-12 RX ORDER — PREGABALIN 50 MG/1
50 CAPSULE ORAL 2 TIMES DAILY
Status: DISCONTINUED | OUTPATIENT
Start: 2025-08-12 | End: 2025-08-13 | Stop reason: HOSPADM

## 2025-08-12 RX ORDER — METOPROLOL SUCCINATE 25 MG/1
25 TABLET, EXTENDED RELEASE ORAL DAILY
Status: DISCONTINUED | OUTPATIENT
Start: 2025-08-12 | End: 2025-08-13 | Stop reason: HOSPADM

## 2025-08-12 RX ORDER — METOPROLOL TARTRATE 1 MG/ML
5 INJECTION, SOLUTION INTRAVENOUS EVERY 5 MIN PRN
Status: ACTIVE | OUTPATIENT
Start: 2025-08-12 | End: 2025-08-12

## 2025-08-12 RX ORDER — SODIUM CHLORIDE 0.9 % (FLUSH) 0.9 %
5-40 SYRINGE (ML) INJECTION PRN
Status: ACTIVE | OUTPATIENT
Start: 2025-08-12 | End: 2025-08-12

## 2025-08-12 RX ORDER — BUSPIRONE HYDROCHLORIDE 10 MG/1
10 TABLET ORAL 3 TIMES DAILY PRN
Status: DISCONTINUED | OUTPATIENT
Start: 2025-08-12 | End: 2025-08-13 | Stop reason: HOSPADM

## 2025-08-12 RX ORDER — ESCITALOPRAM OXALATE 5 MG/1
10 TABLET ORAL DAILY
Status: DISCONTINUED | OUTPATIENT
Start: 2025-08-12 | End: 2025-08-13 | Stop reason: HOSPADM

## 2025-08-12 RX ORDER — SODIUM CHLORIDE 9 MG/ML
500 INJECTION, SOLUTION INTRAVENOUS CONTINUOUS PRN
Status: ACTIVE | OUTPATIENT
Start: 2025-08-12 | End: 2025-08-12

## 2025-08-12 RX ORDER — OXYCODONE HYDROCHLORIDE 5 MG/1
5 TABLET ORAL EVERY 4 HOURS PRN
Refills: 0 | Status: DISCONTINUED | OUTPATIENT
Start: 2025-08-12 | End: 2025-08-13 | Stop reason: HOSPADM

## 2025-08-12 RX ADMIN — TETROFOSMIN 24 MILLICURIE: 0.23 INJECTION, POWDER, LYOPHILIZED, FOR SOLUTION INTRAVENOUS at 15:25

## 2025-08-12 RX ADMIN — FERROUS SULFATE TAB 325 MG (65 MG ELEMENTAL FE) 325 MG: 325 (65 FE) TAB at 21:11

## 2025-08-12 RX ADMIN — MORPHINE SULFATE 2 MG: 2 INJECTION, SOLUTION INTRAMUSCULAR; INTRAVENOUS at 01:21

## 2025-08-12 RX ADMIN — HEPARIN SODIUM AND DEXTROSE 16 UNITS/KG/HR: 10000; 5 INJECTION INTRAVENOUS at 17:01

## 2025-08-12 RX ADMIN — HEPARIN SODIUM AND DEXTROSE 16 UNITS/KG/HR: 10000; 5 INJECTION INTRAVENOUS at 19:25

## 2025-08-12 RX ADMIN — POTASSIUM CHLORIDE 10 MEQ: 7.46 INJECTION, SOLUTION INTRAVENOUS at 13:13

## 2025-08-12 RX ADMIN — POTASSIUM CHLORIDE 10 MEQ: 7.46 INJECTION, SOLUTION INTRAVENOUS at 15:28

## 2025-08-12 RX ADMIN — INSULIN GLARGINE 20 UNITS: 100 INJECTION, SOLUTION SUBCUTANEOUS at 21:10

## 2025-08-12 RX ADMIN — ONDANSETRON 4 MG: 4 TABLET, ORALLY DISINTEGRATING ORAL at 15:27

## 2025-08-12 RX ADMIN — POTASSIUM CHLORIDE 10 MEQ: 7.46 INJECTION, SOLUTION INTRAVENOUS at 17:03

## 2025-08-12 RX ADMIN — HEPARIN SODIUM 4000 UNITS: 1000 INJECTION INTRAVENOUS; SUBCUTANEOUS at 17:01

## 2025-08-12 RX ADMIN — OXYCODONE 5 MG: 5 TABLET ORAL at 19:46

## 2025-08-12 RX ADMIN — BUSPIRONE HYDROCHLORIDE 10 MG: 10 TABLET ORAL at 01:50

## 2025-08-12 RX ADMIN — NORTRIPTYLINE HYDROCHLORIDE 10 MG: 10 CAPSULE ORAL at 21:11

## 2025-08-12 RX ADMIN — POTASSIUM CHLORIDE 10 MEQ: 7.46 INJECTION, SOLUTION INTRAVENOUS at 05:34

## 2025-08-12 RX ADMIN — PREGABALIN 50 MG: 50 CAPSULE ORAL at 21:11

## 2025-08-12 RX ADMIN — POTASSIUM CHLORIDE 10 MEQ: 7.46 INJECTION, SOLUTION INTRAVENOUS at 06:27

## 2025-08-12 RX ADMIN — PREGABALIN 50 MG: 50 CAPSULE ORAL at 01:49

## 2025-08-12 RX ADMIN — ROSUVASTATIN CALCIUM 10 MG: 20 TABLET, FILM COATED ORAL at 01:49

## 2025-08-12 RX ADMIN — TETROFOSMIN 8 MILLICURIE: 0.23 INJECTION, POWDER, LYOPHILIZED, FOR SOLUTION INTRAVENOUS at 15:26

## 2025-08-12 RX ADMIN — ROSUVASTATIN CALCIUM 10 MG: 20 TABLET, FILM COATED ORAL at 21:10

## 2025-08-12 RX ADMIN — HEPARIN SODIUM 4000 UNITS: 1000 INJECTION INTRAVENOUS; SUBCUTANEOUS at 05:40

## 2025-08-12 RX ADMIN — FERROUS SULFATE TAB 325 MG (65 MG ELEMENTAL FE) 325 MG: 325 (65 FE) TAB at 01:50

## 2025-08-12 RX ADMIN — REGADENOSON 0.4 MG: 0.08 INJECTION, SOLUTION INTRAVENOUS at 14:48

## 2025-08-12 ASSESSMENT — ENCOUNTER SYMPTOMS
COLOR CHANGE: 0
VOICE CHANGE: 0
CONSTIPATION: 0
BACK PAIN: 0
DIARRHEA: 0
VOMITING: 0
SHORTNESS OF BREATH: 0
NAUSEA: 0

## 2025-08-12 ASSESSMENT — PAIN - FUNCTIONAL ASSESSMENT
PAIN_FUNCTIONAL_ASSESSMENT: 0-10
PAIN_FUNCTIONAL_ASSESSMENT: WONG-BAKER FACES
PAIN_FUNCTIONAL_ASSESSMENT: 0-10
PAIN_FUNCTIONAL_ASSESSMENT: ACTIVITIES ARE NOT PREVENTED
PAIN_FUNCTIONAL_ASSESSMENT: 0-10

## 2025-08-12 ASSESSMENT — PAIN DESCRIPTION - LOCATION
LOCATION: NECK
LOCATION: CHEST;NECK

## 2025-08-12 ASSESSMENT — PAIN SCALES - GENERAL
PAINLEVEL_OUTOF10: 5
PAINLEVEL_OUTOF10: 8
PAINLEVEL_OUTOF10: 4
PAINLEVEL_OUTOF10: 8
PAINLEVEL_OUTOF10: 5
PAINLEVEL_OUTOF10: 8
PAINLEVEL_OUTOF10: 4

## 2025-08-12 ASSESSMENT — PAIN DESCRIPTION - ORIENTATION: ORIENTATION: RIGHT

## 2025-08-12 ASSESSMENT — PAIN SCALES - WONG BAKER: WONGBAKER_NUMERICALRESPONSE: NO HURT

## 2025-08-12 ASSESSMENT — PAIN DESCRIPTION - DESCRIPTORS
DESCRIPTORS: SHARP
DESCRIPTORS: SHARP

## 2025-08-13 ENCOUNTER — APPOINTMENT (OUTPATIENT)
Dept: GENERAL RADIOLOGY | Age: 70
End: 2025-08-13
Payer: MEDICARE

## 2025-08-13 ENCOUNTER — HOSPITAL ENCOUNTER (EMERGENCY)
Age: 70
Discharge: HOME | End: 2025-08-13
Attending: EMERGENCY MEDICINE
Payer: MEDICARE

## 2025-08-13 VITALS
HEIGHT: 72 IN | OXYGEN SATURATION: 100 % | SYSTOLIC BLOOD PRESSURE: 140 MMHG | TEMPERATURE: 97.7 F | BODY MASS INDEX: 33.18 KG/M2 | HEART RATE: 85 BPM | DIASTOLIC BLOOD PRESSURE: 76 MMHG | WEIGHT: 245 LBS | RESPIRATION RATE: 20 BRPM

## 2025-08-13 VITALS
WEIGHT: 245 LBS | HEART RATE: 106 BPM | RESPIRATION RATE: 20 BRPM | TEMPERATURE: 98 F | BODY MASS INDEX: 33.18 KG/M2 | HEIGHT: 72 IN | SYSTOLIC BLOOD PRESSURE: 110 MMHG | OXYGEN SATURATION: 95 % | DIASTOLIC BLOOD PRESSURE: 69 MMHG

## 2025-08-13 DIAGNOSIS — Z99.2 ESRD ON DIALYSIS (HCC): ICD-10-CM

## 2025-08-13 DIAGNOSIS — R11.2 NAUSEA AND VOMITING, UNSPECIFIED VOMITING TYPE: Primary | ICD-10-CM

## 2025-08-13 DIAGNOSIS — Z86.79 HISTORY OF CAD (CORONARY ARTERY DISEASE): ICD-10-CM

## 2025-08-13 DIAGNOSIS — N18.6 ESRD ON DIALYSIS (HCC): ICD-10-CM

## 2025-08-13 LAB
ALBUMIN SERPL-MCNC: 3.6 G/DL (ref 3.5–5.2)
ALBUMIN SERPL-MCNC: 4.1 G/DL (ref 3.5–5.2)
ALP SERPL-CCNC: 109 U/L (ref 40–129)
ALP SERPL-CCNC: 78 U/L (ref 40–129)
ALT SERPL-CCNC: 12 U/L (ref 10–50)
ALT SERPL-CCNC: 22 U/L (ref 10–50)
ANION GAP SERPL CALCULATED.3IONS-SCNC: 17 MMOL/L (ref 8–16)
ANION GAP SERPL CALCULATED.3IONS-SCNC: 21 MMOL/L (ref 8–16)
ANION GAP SERPL CALCULATED.3IONS-SCNC: 24 MMOL/L (ref 8–16)
APTT PPP: 25.7 SEC (ref 26–36.2)
APTT PPP: 28.1 SEC (ref 26–36.2)
AST SERPL-CCNC: 26 U/L (ref 10–50)
AST SERPL-CCNC: 57 U/L (ref 10–50)
BASOPHILS # BLD: 0.1 K/UL (ref 0–0.2)
BASOPHILS NFR BLD: 0.8 % (ref 0–1)
BILIRUB SERPL-MCNC: 0.6 MG/DL (ref 0.2–1.2)
BILIRUB SERPL-MCNC: 1 MG/DL (ref 0.2–1.2)
BUN SERPL-MCNC: 14 MG/DL (ref 8–23)
BUN SERPL-MCNC: 15 MG/DL (ref 8–23)
BUN SERPL-MCNC: 26 MG/DL (ref 8–23)
CALCIUM SERPL-MCNC: 10.1 MG/DL (ref 8.8–10.2)
CALCIUM SERPL-MCNC: 9 MG/DL (ref 8.8–10.2)
CALCIUM SERPL-MCNC: 9.6 MG/DL (ref 8.8–10.2)
CHLORIDE SERPL-SCNC: 94 MMOL/L (ref 98–107)
CHLORIDE SERPL-SCNC: 96 MMOL/L (ref 98–107)
CHLORIDE SERPL-SCNC: 99 MMOL/L (ref 98–107)
CO2 SERPL-SCNC: 16 MMOL/L (ref 22–29)
CO2 SERPL-SCNC: 22 MMOL/L (ref 22–29)
CO2 SERPL-SCNC: 23 MMOL/L (ref 22–29)
CREAT SERPL-MCNC: 2.5 MG/DL (ref 0.7–1.2)
CREAT SERPL-MCNC: 2.7 MG/DL (ref 0.7–1.2)
CREAT SERPL-MCNC: 3.7 MG/DL (ref 0.7–1.2)
EOSINOPHIL # BLD: 0.2 K/UL (ref 0–0.6)
EOSINOPHIL NFR BLD: 2.3 % (ref 0–5)
ERYTHROCYTE [DISTWIDTH] IN BLOOD BY AUTOMATED COUNT: 14 % (ref 11.5–14.5)
ERYTHROCYTE [DISTWIDTH] IN BLOOD BY AUTOMATED COUNT: 14.1 % (ref 11.5–14.5)
GLUCOSE BLD-MCNC: 75 MG/DL (ref 70–99)
GLUCOSE SERPL-MCNC: 201 MG/DL (ref 70–99)
GLUCOSE SERPL-MCNC: 224 MG/DL (ref 70–99)
GLUCOSE SERPL-MCNC: 96 MG/DL (ref 70–99)
HCT VFR BLD AUTO: 33.2 % (ref 42–52)
HCT VFR BLD AUTO: 39.1 % (ref 42–52)
HGB BLD-MCNC: 11.1 G/DL (ref 14–18)
HGB BLD-MCNC: 13.6 G/DL (ref 14–18)
IMM GRANULOCYTES # BLD: 0.1 K/UL
LIPASE SERPL-CCNC: 11 U/L (ref 13–60)
LYMPHOCYTES # BLD: 1.5 K/UL (ref 1.1–4.5)
LYMPHOCYTES NFR BLD: 15.1 % (ref 20–40)
MAGNESIUM SERPL-MCNC: 2.2 MG/DL (ref 1.6–2.4)
MCH RBC QN AUTO: 30.2 PG (ref 27–31)
MCH RBC QN AUTO: 30.5 PG (ref 27–31)
MCHC RBC AUTO-ENTMCNC: 33.4 G/DL (ref 33–37)
MCHC RBC AUTO-ENTMCNC: 34.8 G/DL (ref 33–37)
MCV RBC AUTO: 87.7 FL (ref 80–94)
MCV RBC AUTO: 90.2 FL (ref 80–94)
MONOCYTES # BLD: 0.7 K/UL (ref 0–0.9)
MONOCYTES NFR BLD: 7.4 % (ref 0–10)
NEUTROPHILS # BLD: 7.3 K/UL (ref 1.5–7.5)
NEUTS SEG NFR BLD: 73.9 % (ref 50–65)
NUC STRESS EJECTION FRACTION: 50 %
PERFORMED ON: NORMAL
PHOSPHATE SERPL-MCNC: 5.3 MG/DL (ref 2.5–4.5)
PLATELET # BLD AUTO: 189 K/UL (ref 130–400)
PLATELET # BLD AUTO: 232 K/UL (ref 130–400)
PMV BLD AUTO: 11.7 FL (ref 9.4–12.4)
PMV BLD AUTO: 12.3 FL (ref 9.4–12.4)
POTASSIUM SERPL-SCNC: 3.5 MMOL/L (ref 3.5–5.1)
POTASSIUM SERPL-SCNC: 3.8 MMOL/L (ref 3.5–5.1)
POTASSIUM SERPL-SCNC: 4.5 MMOL/L (ref 3.5–5.1)
PROT SERPL-MCNC: 5.9 G/DL (ref 6.4–8.3)
PROT SERPL-MCNC: 7.2 G/DL (ref 6.4–8.3)
RBC # BLD AUTO: 3.68 M/UL (ref 4.7–6.1)
RBC # BLD AUTO: 4.46 M/UL (ref 4.7–6.1)
SODIUM SERPL-SCNC: 136 MMOL/L (ref 136–145)
SODIUM SERPL-SCNC: 137 MMOL/L (ref 136–145)
SODIUM SERPL-SCNC: 139 MMOL/L (ref 136–145)
STRESS BASELINE DIAS BP: 72 MMHG
STRESS BASELINE HR: 86 BPM
STRESS BASELINE SYS BP: 141 MMHG
STRESS EXERCISE DUR MIN: 5 MIN
STRESS EXERCISE DUR SEC: 0 SEC
STRESS PEAK DIAS BP: 57 MMHG
STRESS PEAK SYS BP: 147 MMHG
STRESS PERCENT HR ACHIEVED: 65 %
STRESS POST PEAK HR: 98 BPM
STRESS RATE PRESSURE PRODUCT: NORMAL BPM*MMHG
STRESS STAGE 1 BP: NORMAL MMHG
STRESS STAGE 1 HR: 80 BPM
STRESS STAGE 2 BP: NORMAL MMHG
STRESS STAGE 2 HR: 98 BPM
STRESS STAGE 3 BP: NORMAL MMHG
STRESS STAGE 3 HR: 92 BPM
STRESS STAGE 4 BP: NORMAL MMHG
STRESS STAGE 4 HR: 88 BPM
STRESS STAGE 5 BP: NORMAL MMHG
STRESS STAGE 5 HR: 93 BPM
STRESS STAGE RECOVERY 1 BP: NORMAL MMHG
STRESS STAGE RECOVERY 1 HR: 96 BPM
STRESS TARGET HR: 151 BPM
TID: 1.09
TROPONIN, HIGH SENSITIVITY: 129 NG/L (ref 0–22)
TROPONIN, HIGH SENSITIVITY: 133 NG/L (ref 0–22)
WBC # BLD AUTO: 7 K/UL (ref 4.8–10.8)
WBC # BLD AUTO: 9.8 K/UL (ref 4.8–10.8)

## 2025-08-13 PROCEDURE — 5A1D70Z PERFORMANCE OF URINARY FILTRATION, INTERMITTENT, LESS THAN 6 HOURS PER DAY: ICD-10-PCS | Performed by: INTERNAL MEDICINE

## 2025-08-13 PROCEDURE — 93016 CV STRESS TEST SUPVJ ONLY: CPT | Performed by: INTERNAL MEDICINE

## 2025-08-13 PROCEDURE — 36415 COLL VENOUS BLD VENIPUNCTURE: CPT

## 2025-08-13 PROCEDURE — 84100 ASSAY OF PHOSPHORUS: CPT

## 2025-08-13 PROCEDURE — 82962 GLUCOSE BLOOD TEST: CPT

## 2025-08-13 PROCEDURE — 80053 COMPREHEN METABOLIC PANEL: CPT

## 2025-08-13 PROCEDURE — 6370000000 HC RX 637 (ALT 250 FOR IP): Performed by: EMERGENCY MEDICINE

## 2025-08-13 PROCEDURE — 85025 COMPLETE CBC W/AUTO DIFF WBC: CPT

## 2025-08-13 PROCEDURE — 2500000003 HC RX 250 WO HCPCS: Performed by: EMERGENCY MEDICINE

## 2025-08-13 PROCEDURE — 6360000002 HC RX W HCPCS: Performed by: STUDENT IN AN ORGANIZED HEALTH CARE EDUCATION/TRAINING PROGRAM

## 2025-08-13 PROCEDURE — 93018 CV STRESS TEST I&R ONLY: CPT | Performed by: INTERNAL MEDICINE

## 2025-08-13 PROCEDURE — 85027 COMPLETE CBC AUTOMATED: CPT

## 2025-08-13 PROCEDURE — 71045 X-RAY EXAM CHEST 1 VIEW: CPT

## 2025-08-13 PROCEDURE — 6370000000 HC RX 637 (ALT 250 FOR IP): Performed by: STUDENT IN AN ORGANIZED HEALTH CARE EDUCATION/TRAINING PROGRAM

## 2025-08-13 PROCEDURE — 84484 ASSAY OF TROPONIN QUANT: CPT

## 2025-08-13 PROCEDURE — 96374 THER/PROPH/DIAG INJ IV PUSH: CPT

## 2025-08-13 PROCEDURE — 2500000003 HC RX 250 WO HCPCS: Performed by: STUDENT IN AN ORGANIZED HEALTH CARE EDUCATION/TRAINING PROGRAM

## 2025-08-13 PROCEDURE — 78452 HT MUSCLE IMAGE SPECT MULT: CPT | Performed by: INTERNAL MEDICINE

## 2025-08-13 PROCEDURE — 96375 TX/PRO/DX INJ NEW DRUG ADDON: CPT

## 2025-08-13 PROCEDURE — 83690 ASSAY OF LIPASE: CPT

## 2025-08-13 PROCEDURE — 83735 ASSAY OF MAGNESIUM: CPT

## 2025-08-13 PROCEDURE — 93005 ELECTROCARDIOGRAM TRACING: CPT | Performed by: EMERGENCY MEDICINE

## 2025-08-13 PROCEDURE — 8010000000 HC HEMODIALYSIS ACUTE INPT

## 2025-08-13 PROCEDURE — 85730 THROMBOPLASTIN TIME PARTIAL: CPT

## 2025-08-13 PROCEDURE — 99285 EMERGENCY DEPT VISIT HI MDM: CPT

## 2025-08-13 PROCEDURE — 6360000002 HC RX W HCPCS: Performed by: EMERGENCY MEDICINE

## 2025-08-13 RX ORDER — HEPARIN SODIUM 10000 [USP'U]/100ML
5-30 INJECTION, SOLUTION INTRAVENOUS CONTINUOUS
Status: DISCONTINUED | OUTPATIENT
Start: 2025-08-13 | End: 2025-08-13

## 2025-08-13 RX ORDER — ONDANSETRON 4 MG/1
4 TABLET, ORALLY DISINTEGRATING ORAL 3 TIMES DAILY PRN
Qty: 21 TABLET | Refills: 0 | Status: SHIPPED | OUTPATIENT
Start: 2025-08-13

## 2025-08-13 RX ORDER — HEPARIN SODIUM 1000 [USP'U]/ML
4000 INJECTION, SOLUTION INTRAVENOUS; SUBCUTANEOUS PRN
Status: DISCONTINUED | OUTPATIENT
Start: 2025-08-13 | End: 2025-08-13

## 2025-08-13 RX ORDER — HEPARIN SODIUM 1000 [USP'U]/ML
2000 INJECTION, SOLUTION INTRAVENOUS; SUBCUTANEOUS PRN
Status: DISCONTINUED | OUTPATIENT
Start: 2025-08-13 | End: 2025-08-13

## 2025-08-13 RX ORDER — HEPARIN SODIUM 5000 [USP'U]/ML
3600 INJECTION, SOLUTION INTRAVENOUS; SUBCUTANEOUS
Status: DISCONTINUED | OUTPATIENT
Start: 2025-08-13 | End: 2025-08-13 | Stop reason: HOSPADM

## 2025-08-13 RX ORDER — METOCLOPRAMIDE HYDROCHLORIDE 5 MG/ML
10 INJECTION INTRAMUSCULAR; INTRAVENOUS ONCE
Status: COMPLETED | OUTPATIENT
Start: 2025-08-13 | End: 2025-08-13

## 2025-08-13 RX ORDER — PANTOPRAZOLE SODIUM 40 MG/1
40 TABLET, DELAYED RELEASE ORAL DAILY
Qty: 30 TABLET | Refills: 1 | Status: SHIPPED | OUTPATIENT
Start: 2025-08-13

## 2025-08-13 RX ORDER — ONDANSETRON 2 MG/ML
4 INJECTION INTRAMUSCULAR; INTRAVENOUS ONCE
Status: COMPLETED | OUTPATIENT
Start: 2025-08-13 | End: 2025-08-13

## 2025-08-13 RX ADMIN — SODIUM CHLORIDE, PRESERVATIVE FREE 10 ML: 5 INJECTION INTRAVENOUS at 13:36

## 2025-08-13 RX ADMIN — METOCLOPRAMIDE HYDROCHLORIDE 10 MG: 5 INJECTION INTRAMUSCULAR; INTRAVENOUS at 19:17

## 2025-08-13 RX ADMIN — FAMOTIDINE 20 MG: 20 TABLET ORAL at 13:31

## 2025-08-13 RX ADMIN — OXYCODONE 5 MG: 5 TABLET ORAL at 14:21

## 2025-08-13 RX ADMIN — LIDOCAINE HYDROCHLORIDE: 20 SOLUTION ORAL at 19:17

## 2025-08-13 RX ADMIN — HEPARIN SODIUM 4000 UNITS: 1000 INJECTION INTRAVENOUS; SUBCUTANEOUS at 03:08

## 2025-08-13 RX ADMIN — CALCITRIOL CAPSULES 0.25 MCG 0.25 MCG: 0.25 CAPSULE ORAL at 13:31

## 2025-08-13 RX ADMIN — HEPARIN SODIUM AND DEXTROSE 20 UNITS/KG/HR: 10000; 5 INJECTION INTRAVENOUS at 03:11

## 2025-08-13 RX ADMIN — PREGABALIN 50 MG: 50 CAPSULE ORAL at 13:31

## 2025-08-13 RX ADMIN — SODIUM CHLORIDE, PRESERVATIVE FREE 40 MG: 5 INJECTION INTRAVENOUS at 19:17

## 2025-08-13 RX ADMIN — ONDANSETRON 4 MG: 2 INJECTION, SOLUTION INTRAMUSCULAR; INTRAVENOUS at 16:15

## 2025-08-13 RX ADMIN — FERROUS SULFATE TAB 325 MG (65 MG ELEMENTAL FE) 325 MG: 325 (65 FE) TAB at 13:31

## 2025-08-13 RX ADMIN — ESCITALOPRAM 10 MG: 5 TABLET, FILM COATED ORAL at 13:31

## 2025-08-13 RX ADMIN — METOPROLOL SUCCINATE 25 MG: 25 TABLET, EXTENDED RELEASE ORAL at 13:31

## 2025-08-13 RX ADMIN — HEPARIN SODIUM AND DEXTROSE 20 UNITS/KG/HR: 10000; 5 INJECTION INTRAVENOUS at 10:02

## 2025-08-13 RX ADMIN — APIXABAN 5 MG: 5 TABLET, FILM COATED ORAL at 13:35

## 2025-08-13 RX ADMIN — DONEPEZIL HYDROCHLORIDE 10 MG: 10 TABLET ORAL at 13:30

## 2025-08-13 RX ADMIN — TAMSULOSIN HYDROCHLORIDE 0.4 MG: 0.4 CAPSULE ORAL at 13:31

## 2025-08-13 ASSESSMENT — PAIN DESCRIPTION - LOCATION
LOCATION: NECK
LOCATION: ABDOMEN;CHEST
LOCATION: CHEST

## 2025-08-13 ASSESSMENT — PAIN - FUNCTIONAL ASSESSMENT
PAIN_FUNCTIONAL_ASSESSMENT: 0-10

## 2025-08-13 ASSESSMENT — PAIN SCALES - GENERAL
PAINLEVEL_OUTOF10: 9
PAINLEVEL_OUTOF10: 8
PAINLEVEL_OUTOF10: 4

## 2025-08-13 ASSESSMENT — ENCOUNTER SYMPTOMS
NAUSEA: 1
EYES NEGATIVE: 1
VOMITING: 1
RESPIRATORY NEGATIVE: 1

## 2025-08-13 ASSESSMENT — PAIN DESCRIPTION - DESCRIPTORS: DESCRIPTORS: PRESSURE

## 2025-08-15 LAB
EKG P AXIS: NORMAL DEGREES
EKG P-R INTERVAL: NORMAL MS
EKG Q-T INTERVAL: 334 MS
EKG QRS DURATION: 120 MS
EKG QTC CALCULATION (BAZETT): 416 MS
EKG T AXIS: 94 DEGREES

## (undated) DEVICE — PK SPINE CERV ANT 30

## (undated) DEVICE — THE CHANNEL CLEANING BRUSH IS A NYLON FLEXI BRUSH ATTACHED TO A FLEXIBLE PLASTIC SHEATH DESIGNED TO SAFELY REMOVE DEBRIS FROM FLEXIBLE ENDOSCOPES.

## (undated) DEVICE — HALTR TRACT HD CERV STD UNIV

## (undated) DEVICE — SENSR O2 OXIMAX FNGR A/ 18IN NONSTR

## (undated) DEVICE — CONN FLX BREATHE CIRCT

## (undated) DEVICE — TOWEL,OR,DSP,ST,BLUE,DLX,4/PK,20PK/CS: Brand: MEDLINE

## (undated) DEVICE — SUTURE ETHILON SZ 3-0 L18IN NONABSORBABLE BLK L24MM FS-1 3/8 663G

## (undated) DEVICE — STANDARD HYPODERMIC NEEDLE,POLYPROPYLENE HUB: Brand: MONOJECT

## (undated) DEVICE — TBG PENCL TELESCP MEGADYNE SMOKE EVAC 10FT

## (undated) DEVICE — SPNG DISSCT CHRRY 3/8IN STRL PK/5

## (undated) DEVICE — TBG SMPL FLTR LINE NASL 02/C02 A/ BX/100

## (undated) DEVICE — CONMED SCOPE SAVER BITE BLOCK, 20X27 MM: Brand: SCOPE SAVER

## (undated) DEVICE — CATH F5 INF IM 100CM: Brand: INFINITI

## (undated) DEVICE — FRCP BX RADJAW4 NDL 2.8 240 STD OG

## (undated) DEVICE — NEEDLE, QUINCKE, 18GX3.5": Brand: MEDLINE

## (undated) DEVICE — A2000 MULTI-USE SYRINGE KIT, P/N 701277-003KIT CONTENTS: 100ML CONTRAST RESERVOIR AND TUBING WITH CONTRAST SPIKE AND CLAMP: Brand: A2000 MULTI-USE SYRINGE KIT

## (undated) DEVICE — TIBURON BRACHIAL ANGIOGRAPHY DRAPE: Brand: CONVERTORS

## (undated) DEVICE — DRAPE C ARM W42XL120IN W POLY STRP W OUT LENS

## (undated) DEVICE — CUFF,BP,DISP,1 TUBE,ADULT,HP: Brand: MEDLINE

## (undated) DEVICE — BARR HEMO VASC/PERIF CLOSUR/PAD 4X4CM

## (undated) DEVICE — PROXIMATE RH ROTATING HEAD SKIN STAPLERS (35 WIDE) CONTAINS 35 STAINLESS STEEL STAPLES: Brand: PROXIMATE

## (undated) DEVICE — CATHETER HD STR 14.5 FRX28 CM 23 CM STD LT KT STYL GLIDEPATH - ORDER UOM CA

## (undated) DEVICE — LIQUIBAND RAPID ADHESIVE 36/CS 0.8ML: Brand: MEDLINE

## (undated) DEVICE — GLIDESHEATH SLENDER STAINLESS STEEL KIT: Brand: GLIDESHEATH SLENDER

## (undated) DEVICE — YANKAUER,BULB TIP WITH VENT: Brand: ARGYLE

## (undated) DEVICE — SHEET,T,THYROID,STERILE: Brand: MEDLINE

## (undated) DEVICE — ENDOGATOR AUXILIARY WATER JET CONNECTOR: Brand: ENDOGATOR

## (undated) DEVICE — DRSNG SURESITE WNDW 4X4.5

## (undated) DEVICE — GW STARTER FXD CORE J .035 3X260CM 3MM

## (undated) DEVICE — NDL SCLEROTHRPY INTERJECT 25G 4 240 CLR

## (undated) DEVICE — Device: Brand: DEFENDO AIR/WATER/SUCTION AND BIOPSY VALVE

## (undated) DEVICE — SOLIDIFIER LIQUI LOC PLUS 2000CC

## (undated) DEVICE — ELECTRD BLD EZ CLN MOD XLNG 2.75IN

## (undated) DEVICE — MODEL AT P65, P/N 701554-001KIT CONTENTS: HAND CONTROLLER, 3-WAY HIGH-PRESSURE STOPCOCK WITH ROTATING END AND PREMIUM HIGH-PRESSURE TUBING: Brand: ANGIOTOUCH® KIT

## (undated) DEVICE — MODEL BT2000 P/N 700287-012KIT CONTENTS: MANIFOLD WITH SALINE AND CONTRAST PORTS, SALINE TUBING WITH SPIKE AND HAND SYRINGE, TRANSDUCER: Brand: BT2000 AUTOMATED MANIFOLD KIT

## (undated) DEVICE — ANTIBACTERIAL UNDYED BRAIDED (POLYGLACTIN 910), SYNTHETIC ABSORBABLE SUTURE: Brand: COATED VICRYL

## (undated) DEVICE — DRP C/ARMOR

## (undated) DEVICE — DISPOSABLE IRRIGATION CASSETTE: Brand: CORE

## (undated) DEVICE — MASK,OXYGEN,MED CONC,ADLT,7' TUB, UC: Brand: PENDING

## (undated) DEVICE — SUP ARMBRD ART/LINE BLU

## (undated) DEVICE — DECANTER FLD 9IN ST BG FOR ASEP TRNSF OF FLD

## (undated) DEVICE — GLOVE SURG SZ 7 L12IN FNGR THK79MIL GRN LTX FREE

## (undated) DEVICE — PACK,UNIVERSAL,NO GOWNS: Brand: MEDLINE

## (undated) DEVICE — RADIFOCUS OPTITORQUE ANGIOGRAPHIC CATHETER: Brand: OPTITORQUE

## (undated) DEVICE — BAPTIST TURNOVER KIT: Brand: MEDLINE INDUSTRIES, INC.

## (undated) DEVICE — GLV SURG BIOGEL LTX PF 6 1/2

## (undated) DEVICE — PK CATH CARD 30 CA/4

## (undated) DEVICE — SKIN PREP TRAY W/CHG: Brand: MEDLINE INDUSTRIES, INC.

## (undated) DEVICE — SUTURE VICRYL + SZ 3-0 L27IN ABSRB UD L26MM SH 1/2 CIR VCP416H

## (undated) DEVICE — TRAP FLD MINIVAC MEGADYNE 100ML

## (undated) DEVICE — SUTURE N ABSRB MONOFILAMENT 2-0 FSLX 75 CM 36 MM ETHILON

## (undated) DEVICE — PENCIL BTTN S S CAUT TIP W HOLSTER 25 50

## (undated) DEVICE — SOL IRR NACL 0.9PCT BT 1000ML

## (undated) DEVICE — KT NDL GUIDE STRL 18GA

## (undated) DEVICE — CLTH CLENS READYCLEANSE PERI CARE PK/5

## (undated) DEVICE — EMG TUBE 8229707 NIM TRIVANTAGE 7.0MM ID: Brand: NIM TRIVANTAGE™

## (undated) DEVICE — SOLUTION IV 250ML 0.9% SOD CHL PH 5 INJ USP VIAFLX PLAS

## (undated) DEVICE — GLV SURG BIOGEL LTX PF 8

## (undated) DEVICE — GLV SURG DERMASSURE GRN LF PF 8.0

## (undated) DEVICE — CATHETER KIT 5 FR 21 GAX7 CM MICROINTRODUCER GUIDEWIRE STIFF

## (undated) DEVICE — DRAPE,UTILITY,XL,4/PK,STERILE: Brand: MEDLINE

## (undated) DEVICE — VAGINAL PREP TRAY: Brand: MEDLINE INDUSTRIES, INC.

## (undated) DEVICE — MINOR CDS: Brand: MEDLINE INDUSTRIES, INC.

## (undated) DEVICE — LIMB HOLDER, WRIST/ANKLE: Brand: DEROYAL

## (undated) DEVICE — GLV SURG DERMASSURE GRN LF PF 7.0

## (undated) DEVICE — TR BAND RADIAL ARTERY COMPRESSION DEVICE: Brand: TR BAND

## (undated) DEVICE — 5.0MM BARREL STRAIGHT FLUTE

## (undated) DEVICE — SUT MNCRYL 4/0 PS2 27IN UD MCP426H

## (undated) DEVICE — CATH F5 INF MP A2 100CM 2SH: Brand: INFINITI

## (undated) DEVICE — CATH DIAG IMPULSE PIG145 5F 110CM

## (undated) DEVICE — ELECTRD PAD DEFIB A/